# Patient Record
Sex: FEMALE | Race: WHITE | NOT HISPANIC OR LATINO | Employment: OTHER | URBAN - METROPOLITAN AREA
[De-identification: names, ages, dates, MRNs, and addresses within clinical notes are randomized per-mention and may not be internally consistent; named-entity substitution may affect disease eponyms.]

---

## 2017-02-20 ENCOUNTER — TRANSFERRED RECORDS (OUTPATIENT)
Dept: HEALTH INFORMATION MANAGEMENT | Facility: CLINIC | Age: 57
End: 2017-02-20

## 2017-03-27 ENCOUNTER — TRANSFERRED RECORDS (OUTPATIENT)
Dept: HEALTH INFORMATION MANAGEMENT | Facility: CLINIC | Age: 57
End: 2017-03-27

## 2017-03-31 ENCOUNTER — TRANSFERRED RECORDS (OUTPATIENT)
Dept: HEALTH INFORMATION MANAGEMENT | Facility: CLINIC | Age: 57
End: 2017-03-31

## 2017-04-03 ENCOUNTER — TRANSFERRED RECORDS (OUTPATIENT)
Dept: HEALTH INFORMATION MANAGEMENT | Facility: CLINIC | Age: 57
End: 2017-04-03

## 2017-04-07 ENCOUNTER — TRANSFERRED RECORDS (OUTPATIENT)
Dept: HEALTH INFORMATION MANAGEMENT | Facility: CLINIC | Age: 57
End: 2017-04-07

## 2017-04-17 ENCOUNTER — TRANSFERRED RECORDS (OUTPATIENT)
Dept: HEALTH INFORMATION MANAGEMENT | Facility: CLINIC | Age: 57
End: 2017-04-17

## 2017-05-26 PROCEDURE — 00000346 ZZHCL STATISTIC REVIEW OUTSIDE SLIDES TC 88321: Performed by: OBSTETRICS & GYNECOLOGY

## 2017-06-06 ASSESSMENT — ENCOUNTER SYMPTOMS
SNORES LOUDLY: 1
HEMOPTYSIS: 0
COUGH: 1
EXERCISE INTOLERANCE: 1
CLAUDICATION: 0
LIGHT-HEADEDNESS: 0
RESPIRATORY PAIN: 1
TACHYCARDIA: 1
LEG PAIN: 0
ORTHOPNEA: 1
SPUTUM PRODUCTION: 1
SHORTNESS OF BREATH: 1
COUGH DISTURBING SLEEP: 1
SYNCOPE: 0
POSTURAL DYSPNEA: 1
DYSPNEA ON EXERTION: 1
HYPOTENSION: 1
LEG SWELLING: 0
SLEEP DISTURBANCES DUE TO BREATHING: 0
WHEEZING: 1
HYPERTENSION: 0
PALPITATIONS: 0

## 2017-06-09 ENCOUNTER — ONCOLOGY VISIT (OUTPATIENT)
Dept: ONCOLOGY | Facility: CLINIC | Age: 57
End: 2017-06-09
Attending: OBSTETRICS & GYNECOLOGY
Payer: COMMERCIAL

## 2017-06-09 VITALS
TEMPERATURE: 97.9 F | BODY MASS INDEX: 22.07 KG/M2 | OXYGEN SATURATION: 89 % | HEIGHT: 63 IN | RESPIRATION RATE: 20 BRPM | SYSTOLIC BLOOD PRESSURE: 115 MMHG | DIASTOLIC BLOOD PRESSURE: 70 MMHG | WEIGHT: 124.56 LBS | HEART RATE: 98 BPM

## 2017-06-09 DIAGNOSIS — D06.9 SEVERE DYSPLASIA OF CERVIX (CIN III): Primary | ICD-10-CM

## 2017-06-09 LAB — COPATH REPORT: NORMAL

## 2017-06-09 PROCEDURE — 99213 OFFICE O/P EST LOW 20 MIN: CPT | Mod: ZF

## 2017-06-09 PROCEDURE — 99204 OFFICE O/P NEW MOD 45 MIN: CPT | Mod: ZP | Performed by: OBSTETRICS & GYNECOLOGY

## 2017-06-09 RX ORDER — ALBUTEROL SULFATE 90 UG/1
2 AEROSOL, METERED RESPIRATORY (INHALATION) EVERY 6 HOURS PRN
COMMUNITY
End: 2022-08-15

## 2017-06-09 RX ORDER — MINOCYCLINE HYDROCHLORIDE 100 MG/1
TABLET ORAL DAILY
COMMUNITY
End: 2021-05-25

## 2017-06-09 RX ORDER — LISINOPRIL 10 MG/1
5 TABLET ORAL DAILY
COMMUNITY
End: 2021-05-25

## 2017-06-09 RX ORDER — HYDROCHLOROTHIAZIDE 25 MG/1
12.5 TABLET ORAL DAILY
COMMUNITY
End: 2021-05-25

## 2017-06-09 RX ORDER — IPRATROPIUM BROMIDE 0.2 MG/ML
0.5 SOLUTION, NON-ORAL INHALATION DAILY
Status: ON HOLD | COMMUNITY
End: 2022-06-28

## 2017-06-09 ASSESSMENT — PAIN SCALES - GENERAL: PAINLEVEL: NO PAIN (0)

## 2017-06-09 NOTE — NURSING NOTE
"Oncology Rooming Note    June 9, 2017 11:58 AM   Sofie Rodriguez is a 57 year old female who presents for:    Chief Complaint   Patient presents with     Oncology Clinic Visit     New Referrel for Leep Procedure results , 2 nd opinion      Initial Vitals: /70 (BP Location: Right arm, Patient Position: Chair, Cuff Size: Adult Regular)  Pulse 98  Temp 97.9  F (36.6  C) (Oral)  Resp 20  Ht 1.6 m (5' 2.99\")  Wt 56.5 kg (124 lb 9 oz)  SpO2 (!) 89%  BMI 22.07 kg/m2 Estimated body mass index is 22.07 kg/(m^2) as calculated from the following:    Height as of this encounter: 1.6 m (5' 2.99\").    Weight as of this encounter: 56.5 kg (124 lb 9 oz). Body surface area is 1.58 meters squared.  No Pain (0) Comment: Data Unavailable   No LMP recorded.  Allergies reviewed: Yes  Medications reviewed: Yes    Medications: Medication refills not needed today.  Pharmacy name entered into EPIC: Data Unavailable    Clinical concerns: opinion  Nathaly  was notified.    8  minutes for nursing intake (face to face time)     Joelle Segura MA              "

## 2017-06-09 NOTE — PROGRESS NOTES
Gynecologic Oncology Clinic - Consult Note    Date: 2017     Dr. Amie Blanco MD  [facility]  [address] [address]       RE: Sofie Rodriguez  : 1960  RUTHANN: 2017    Dear Dr. Amie Blanco:    I had the pleasure of seeing your patient Sofie Rodriguez here at the Gynecologic Cancer Clinic at the [facility] on 2017.  As you know she is a very pleasant 57 year old woman with a recent diagnosis of  HECTOR III after LEEP.  Given these findings [facility].     She reports a remote history of abnormal pap smears prior to the year . She then had normal pap smears until , when she had a NILM, HR HPV positive pap smear. On 17 she had a pap smear with atypical endocervical cells, HR HPV positive. She underwent a colposcopy that was inadequate, with biopsies taken at 4:00 and 8:00. Pathology returned as HECTOR III, cannot rule out malignancy at 4:00; condyloma at 8:00. She subsequently underwent a LEEP on 17. Pathology returned as HECTOR I on ectocervical specimen, and HECTOR III with endocervical glandular involvement without evidence of invasive carcinoma on the endocervical specimen. Margins were not mentioned in the pathology report. Today she reports she is doing well.       Review of Systems:  Systemic  no weight changes; no fever; no chills; no night sweats; no appetite changes  Skin   no rashes, or lesions  Eye   no irritation; no changes in vision  HEENT +frequent ear infection, no dysphagia; no hoarseness   Pulmonary +chronic cough, occasional sputum; no shortness of breath  CV  no chest pain; no palpitations  GI  no diarrhea; no constipation; no abdominal pain; no changes in bowel  habits; no blood in stool    no urinary frequency; no urinary urgency; no dysuria; no pain; no abnormal vaginal discharge; no abnormal vaginal bleeding  Breast  no breast discharge; no breast changes; no  breast pain  MSK  no myalgias; no arthralgias; no back pain  Psychiatric  no depressed mood; no anxiety    Hematologic no tender lymph nodes; no noticeable swellings or lumps   Endocrine  no hot flashes; no heat/cold intolerance         Neurological no tremor; no numbness and tingling; no headaches; no difficulty  sleeping    Past Medical History:  Past Medical History:   Diagnosis Date     CHF (congestive heart failure) (H)      COPD (chronic obstructive pulmonary disease) (H)      HTN (hypertension)      Osteopenia      On 2-3L oxygen via nasal cannula.    OB history:   x3    Past Surgical History:  Past Surgical History:   Procedure Laterality Date     HAND SURGERY       LEEP TX, CERVICAL  2017    HECTOR III     LYMPH NODE BIOPSY Left     Left axilla, benign       Health Maintenance:  Health Maintenance Due   Topic Date Due     TETANUS IMMUNIZATION (SYSTEM ASSIGNED)  1980     ADVANCE DIRECTIVE PLANNING Q5 YRS  1980     HEPATITIS C SCREENING  1980     PAP SCREENING Q3 YR (SYSTEM ASSIGNED)  1983     MAMMO SCREEN Q2 YR (SYSTEM ASSIGNED)  2002     LIPID SCREEN Q5 YR FEMALE (SYSTEM ASSIGNED)  2007     COLON CANCER SCREEN (SYSTEM ASSIGNED)  2012       Last Pap Smear: 17              Result: abnormal: atypical endocervical cells; She has had a history of abnormal Pap smears. Prior pap smear in  was NILM, HR HPV positive.  Last Mammogram:               Result: normal; She has not had a history of abnormal mammograms.  Last Colonoscopy:               Result: normal; next due in     Last DEXA Scan: 2016            L spine -0.9, L hip -1.1, R hip 1  Last Diabetes Screening: unsure  Last Thyroid Screening:      unsure  Last Cholest Screening:      unsure    Current Medications:   Current Outpatient Prescriptions   Medication Sig Dispense Refill     hydrochlorothiazide (HYDRODIURIL) 25 MG tablet Take 12.5 mg by mouth daily        lisinopril  "(PRINIVIL/ZESTRIL) 10 MG tablet Take 5 mg by mouth daily          Allergies:   No Known Allergies     Social History:     Social History   Substance Use Topics     Smoking status: Current Every Day Smoker     Smokeless tobacco: Not on file     Alcohol use Not on file       History   Drug Use Not on file       Family History: Notable for father with prostate cancer at age 62,  at age 82 from unrelated cause (patient unsure); mother with history of cervical cancer at age 55, skin cancer other than melanoma, still living and doing well.    Physical Exam:   /70 (BP Location: Right arm, Patient Position: Chair, Cuff Size: Adult Regular)  Pulse 98  Temp 97.9  F (36.6  C) (Oral)  Resp 20  Ht 1.6 m (5' 2.99\")  Wt 56.5 kg (124 lb 9 oz)  SpO2 (!) 89%  BMI 22.07 kg/m2  Body mass index is 22.07 kg/(m^2).  General :  healthy and alert, no distress  HEENT:  O2 via nasal cannula in place  Cardiovascular: regular rate and rhythm, no gallops, rubs or murmurs   Respiratory:  lungs clear other than occasional wheeze, no rales, rhonchi     Copath Report 2017 12:00 AM 88   Patient Name: ALMAS CASIANO   MR#: [id number]   Specimen #: [id number]   Collected: 2017   Received: 2017   Reported: 2017 12:38   Ordering Phy(s): ELICIA MEJIA   Additional Phy(s): [facility], Department of   Pathology     For improved result formatting, select 'View Enhanced Report Format'   under Linked Documents section.     TEST(S):   2 slides, case #[id number]     FINAL DIAGNOSIS:   CASE FROM [place], [place] ([id number], OBTAINED 17):   A. Endocervix, LEEP:   - High grade squamous intraepithelial lesion (cervical intraepithelial   neoplasia 3, HECTOR 3)   - HECTOR 3 involves endocervical glands   - Margins are uninvolved by HECTOR 3   - Transformation zone present     B. Ectocervix, LEEP:   - Low grade squamous intraepithelial lesion (cervical intraepithelial   neoplasia 1)   - HECTOR 1 is " "present at one cauterized margin (unoriented specimen)   - Transformation zone absent     COMMENT:   Specimen B (\"ectocervix\") was received as two unoriented pieces of   tissue, according to the outside pathology report.  HECTOR 1 is present at   the cauterized edge of one of the pieces of tissue.  We cannot determine   whether or not that is a true margin.     We agree with the findings of the original interpreting pathologist.     I have personally reviewed all specimens and or slides, including the   listed special stains, and used them with my medical judgement to   determine the final diagnosis.     Electronically signed out by:     Jannet Slaughter M.D., [facility]          Assessment: Sofie Rodriguez is a 57 year old woman with a new diagnosis of HECTOR III, s/p LEEP. Referred for management/surveillance recommendations.   1. Disease - HECTOR III, s/p LEEP    Plan:   1.)   Reviewed that patient has had appropriate treatment with LEEP, and no additional procedures are required at this time.  2.) Recommend pap smear + co-testing in 12 months with Dr. Blanco. If normal, repeat pap smear + co-testing at 24 months. If normal, may resume routine screening (q3 year pap or q5 year co-testing).  3.) Questions answered. No additional testing or procedures at this time.     Rika Olson MD as scribe for Dr. Andersen    A total of 40 minutes was spent with the patient, 20 minutes of which were spent in counseling the patient and/or treatment planning.    Thank you for allowing us to participate in the care of your patient.         Sincerely,  Renzo Andersen Jr., M.D., M.S.  Professor, Gynecologic Oncology  Obstetrics, Gynecology and Women's Health   [facility] [employer]  Chief Medical Officer  [facility] and [facility] evaluated and examined this patient and directed all aspects of her care as outlined in mine and the resident's note above        FRANCHESCA RECIO     "

## 2017-06-09 NOTE — MR AVS SNAPSHOT
"              After Visit Summary   6/9/2017    Sofie Rodriguez    MRN: [id number]           Patient Information     Date Of Birth          1960        Visit Information        Provider Department      6/9/2017 12:00 PM Renzo Andersen MD [facility]        Today's Diagnoses     Severe dysplasia of cervix (HECTOR III)    -  1       Follow-ups after your visit        Who to contact     If you have questions or need follow up information about today's clinic visit or your schedule please contact [facility] directly at [phone].  Normal or non-critical lab and imaging results will be communicated to you by [employer], letter or phone within 4 business days after the clinic has received the results. If you do not hear from us within 7 days, please contact the clinic through [employer] or phone. If you have a critical or abnormal lab result, we will notify you by phone as soon as possible.  Submit refill requests through [employer] or call your pharmacy and they will forward the refill request to us. Please allow 3 business days for your refill to be completed.          Additional Information About Your Visit        MyChart Information     [employer] gives you secure access to your electronic health record. If you see a primary care provider, you can also send messages to your care team and make appointments. If you have questions, please call your primary care clinic.  If you do not have a primary care provider, please call [phone] and they will assist you.        Care EveryWhere ID     This is your Care EveryWhere ID. This could be used by other organizations to access your [facility] medical records  [id number]        Your Vitals Were     Pulse Temperature Respirations Height Pulse Oximetry BMI (Body Mass Index)    98 97.9  F (36.6  C) (Oral) 20 1.6 m (5' 2.99\") 89% 22.07 kg/m2       Blood Pressure from Last 3 Encounters:   06/09/17 115/70    Weight from Last 3 Encounters:   06/09/17 " 56.5 kg (124 lb 9 oz)              Today, you had the following     No orders found for display       Primary Care Provider    None Specified       No primary provider on file.        Equal Access to Services     HUNG PAYAN : Hadii aad ku hadgueroradha Hicks, jhonathan deepaamritha, nathaniel paigebetzaida sanders, eduarda rmelba ed. So [facility] [phone].    ATENCIÓN: Si habla español, tiene a guadalupe disposición servicios gratuitos de asistencia lingüística. Llame al [phone].    We comply with applicable federal civil rights laws and [place] laws. We do not discriminate on the basis of race, color, national origin, age, disability sex, sexual orientation or gender identity.            Thank you!     Thank you for choosing [facility]  for your care. Our goal is always to provide you with excellent care. Hearing back from our patients is one way we can continue to improve our services. Please take a few minutes to complete the written survey that you may receive in the mail after your visit with us. Thank you!             Your Updated Medication List - Protect others around you: Learn how to safely use, store and throw away your medicines at www.disposemymeds.org.          This list is accurate as of: 6/9/17 11:59 PM.  Always use your most recent med list.                   Brand Name Dispense Instructions for use Diagnosis    albuterol 108 (90 BASE) MCG/ACT Inhaler    PROAIR HFA/PROVENTIL HFA/VENTOLIN HFA     Inhale 2 puffs into the lungs every 6 hours as needed for shortness of breath / dyspnea or wheezing        BREO ELLIPTA 100-25 MCG/INH oral inhaler   Generic drug:  fluticasone-vilanterol      Inhale 1 puff into the lungs daily 25 mcg        hydrochlorothiazide 25 MG tablet    HYDRODIURIL     Take 12.5 mg by mouth daily        INCRUSE ELLIPTA 62.5 MCG/INH oral inhaler   Generic drug:  umeclidinium      Inhale 1 puff into the lungs daily 62.5mcg        ipratropium 0.02 % nebulizer  solution    ATROVENT     Take 0.5 mg by nebulization daily 0.5md daily        lisinopril 10 MG tablet    PRINIVIL/ZESTRIL     Take 5 mg by mouth daily        minocycline 100 MG tablet    DYNACIN     Take by mouth daily

## 2017-06-09 NOTE — LETTER
2017       RE: Sofie Rodriguez  [address] [place] [address]     Dear Colleague,    Thank you for referring your patient, Sofie Rodriguez, to the [facility]. Please see a copy of my visit note below.    Gynecologic Oncology Clinic - Consult Note    Date: 2017     Dr. Amie Blanco MD  [facility]  [address] [place] [address]       RE: Sofie Rodriguez  : 1960  RUTHANN: 2017    Dear Dr. Amie Blanco:    I had the pleasure of seeing your patient Sofie Rodriguez here at the Gynecologic Cancer Clinic at the [facility] on 2017.  As you know she is a very pleasant 57 year old woman with a recent diagnosis of  HECTOR III after LEEP.  Given these findings she was subsequently sent to the [facility] for new patient consultation.     She reports a remote history of abnormal pap smears prior to the year . She then had normal pap smears until , when she had a NILM, HR HPV positive pap smear. On 17 she had a pap smear with atypical endocervical cells, HR HPV positive. She underwent a colposcopy that was inadequate, with biopsies taken at 4:00 and 8:00. Pathology returned as HECTOR III, cannot rule out malignancy at 4:00; condyloma at 8:00. She subsequently underwent a LEEP on 17. Pathology returned as HECTOR I on ectocervical specimen, and HECTOR III with endocervical glandular involvement without evidence of invasive carcinoma on the endocervical specimen. Margins were not mentioned in the pathology report. Today she reports she is doing well.       Review of Systems:  Systemic  no weight changes; no fever; no chills; no night sweats; no appetite changes  Skin   no rashes, or lesions  Eye   no irritation; no changes in vision  HEENT +frequent ear infection, no dysphagia; no hoarseness   Pulmonary +chronic cough, occasional sputum; no shortness of breath  CV  no chest pain; no palpitations  GI  no diarrhea; no constipation; no abdominal  pain; no changes in bowel  habits; no blood in stool    no urinary frequency; no urinary urgency; no dysuria; no pain; no abnormal vaginal discharge; no abnormal vaginal bleeding  Breast  no breast discharge; no breast changes; no breast pain  MSK  no myalgias; no arthralgias; no back pain  Psychiatric  no depressed mood; no anxiety    Hematologic no tender lymph nodes; no noticeable swellings or lumps   Endocrine  no hot flashes; no heat/cold intolerance         Neurological no tremor; no numbness and tingling; no headaches; no difficulty  sleeping    Past Medical History:  Past Medical History:   Diagnosis Date     CHF (congestive heart failure) (H)      COPD (chronic obstructive pulmonary disease) (H)      HTN (hypertension)      Osteopenia      On 2-3L oxygen via nasal cannula.    OB history:   x3    Past Surgical History:  Past Surgical History:   Procedure Laterality Date     HAND SURGERY       LEEP TX, CERVICAL  2017    HECTOR III     LYMPH NODE BIOPSY Left     Left axilla, benign       Health Maintenance:  Health Maintenance Due   Topic Date Due     TETANUS IMMUNIZATION (SYSTEM ASSIGNED)  1980     ADVANCE DIRECTIVE PLANNING Q5 YRS  1980     HEPATITIS C SCREENING  1980     PAP SCREENING Q3 YR (SYSTEM ASSIGNED)  1983     MAMMO SCREEN Q2 YR (SYSTEM ASSIGNED)  2002     LIPID SCREEN Q5 YR FEMALE (SYSTEM ASSIGNED)  2007     COLON CANCER SCREEN (SYSTEM ASSIGNED)  2012       Last Pap Smear: 17              Result: abnormal: atypical endocervical cells; She has had a history of abnormal Pap smears. Prior pap smear in  was NILM, HR HPV positive.  Last Mammogram:               Result: normal; She has not had a history of abnormal mammograms.  Last Colonoscopy:               Result: normal; next due in     Last DEXA Scan: 2016            L spine -0.9, L hip -1.1, R hip 1  Last Diabetes Screening: unsure  Last Thyroid Screening:      unsure  Last  "Cholest Screening:      unsure    Current Medications:   Current Outpatient Prescriptions   Medication Sig Dispense Refill     hydrochlorothiazide (HYDRODIURIL) 25 MG tablet Take 12.5 mg by mouth daily        lisinopril (PRINIVIL/ZESTRIL) 10 MG tablet Take 5 mg by mouth daily          Allergies:   No Known Allergies     Social History:     Social History   Substance Use Topics     Smoking status: Current Every Day Smoker     Smokeless tobacco: Not on file     Alcohol use Not on file       History   Drug Use Not on file       Family History: Notable for father with prostate cancer at age 62,  at age 82 from unrelated cause (patient unsure); mother with history of cervical cancer at age 55, skin cancer other than melanoma, still living and doing well.    Physical Exam:   /70 (BP Location: Right arm, Patient Position: Chair, Cuff Size: Adult Regular)  Pulse 98  Temp 97.9  F (36.6  C) (Oral)  Resp 20  Ht 1.6 m (5' 2.99\")  Wt 56.5 kg (124 lb 9 oz)  SpO2 (!) 89%  BMI 22.07 kg/m2  Body mass index is 22.07 kg/(m^2).  General :  healthy and alert, no distress  HEENT:  O2 via nasal cannula in place  Cardiovascular: regular rate and rhythm, no gallops, rubs or murmurs   Respiratory:  lungs clear other than occasional wheeze, no rales, rhonchi     Copath Report 2017 12:00 AM 88   Patient Name: ALMAS CASIANO   MR#: [id number]   Specimen #: [id number]   Collected: 2017   Received: 2017   Reported: 2017 12:38   Ordering Phy(s): ELICIA MEJIA   Additional Phy(s): [facility], Department of   Pathology     For improved result formatting, select 'View Enhanced Report Format'   under Linked Documents section.     TEST(S):   2 slides, case #[id number]     FINAL DIAGNOSIS:   CASE FROM [place], [place] ([id number], OBTAINED 17):   A. Endocervix, LEEP:   - High grade squamous intraepithelial lesion (cervical intraepithelial   neoplasia 3, HECTOR 3)   - HECTOR 3 " "involves endocervical glands   - Margins are uninvolved by HECTOR 3   - Transformation zone present     B. Ectocervix, LEEP:   - Low grade squamous intraepithelial lesion (cervical intraepithelial   neoplasia 1)   - HECTOR 1 is present at one cauterized margin (unoriented specimen)   - Transformation zone absent     COMMENT:   Specimen B (\"ectocervix\") was received as two unoriented pieces of   tissue, according to the outside pathology report.  HECTOR 1 is present at   the cauterized edge of one of the pieces of tissue.  We cannot determine   whether or not that is a true margin.     We agree with the findings of the original interpreting pathologist.     I have personally reviewed all specimens and or slides, including the   listed special stains, and used them with my medical judgement to   determine the final diagnosis.     Electronically signed out by:     Jannet Slaughter M.D., [facility]          Assessment: Sofie Rodriguez is a 57 year old woman with a new diagnosis of HECTOR III, s/p LEEP. Referred for management/surveillance recommendations.   1. Disease - HECTOR III, s/p LEEP    Plan:   1.)   Reviewed that patient has had appropriate treatment with LEEP, and no additional procedures are required at this time.  2.) Recommend pap smear + co-testing in 12 months with Dr. Blanco. If normal, repeat pap smear + co-testing at 24 months. If normal, may resume routine screening (q3 year pap or q5 year co-testing).  3.) Questions answered. No additional testing or procedures at this time.     Rika Olson MD as scribe for Dr. Andersen    A total of 40 minutes was spent with the patient, 20 minutes of which were spent in counseling the patient and/or treatment planning.    Thank you for allowing us to participate in the care of your patient.         Sincerely,  Renzo Andersen Jr., M.D., M.S.  Professor, Gynecologic Oncology  Obstetrics, Gynecology and Women's Health   [employer] [place] [employer]  Chief Medical " Officer  [facility] evaluated and examined this patient and directed all aspects of her care as outlined in mine and the resident's note above        CC  FRANCHESCA BRADFORD

## 2018-01-21 ENCOUNTER — HEALTH MAINTENANCE LETTER (OUTPATIENT)
Age: 58
End: 2018-01-21

## 2018-07-03 LAB — HEP C HIM: NORMAL

## 2018-09-15 LAB — TSH SERPL-ACNC: 2.19 UIU/ML (ref 0.47–5)

## 2019-04-29 ENCOUNTER — TRANSFERRED RECORDS (OUTPATIENT)
Dept: HEALTH INFORMATION MANAGEMENT | Facility: CLINIC | Age: 59
End: 2019-04-29

## 2019-07-17 LAB
HPV ABSTRACT: NORMAL
PAP-ABSTRACT: NORMAL

## 2020-01-11 ENCOUNTER — TRANSFERRED RECORDS (OUTPATIENT)
Dept: HEALTH INFORMATION MANAGEMENT | Facility: CLINIC | Age: 60
End: 2020-01-11

## 2020-01-11 LAB — INR PPP: 1 (ref 0.9–1.1)

## 2020-01-12 LAB — EJECTION FRACTION: NORMAL %

## 2020-01-15 LAB
CREAT SERPL-MCNC: 0.67 MG/DL (ref 0.57–1.11)
GFR SERPL CREATININE-BSD FRML MDRD: >60 ML/MIN/1.73M2

## 2020-03-10 ENCOUNTER — HEALTH MAINTENANCE LETTER (OUTPATIENT)
Age: 60
End: 2020-03-10

## 2020-03-11 ENCOUNTER — TRANSFERRED RECORDS (OUTPATIENT)
Dept: HEALTH INFORMATION MANAGEMENT | Facility: CLINIC | Age: 60
End: 2020-03-11

## 2020-11-13 LAB
ALT SERPL-CCNC: 15 U/L (ref 8–45)
AST SERPL-CCNC: 32 U/L (ref 5–41)
CHOLEST SERPL-MCNC: 234 MG/DL
HBA1C MFR BLD: 5.1 % (ref 0–5.7)
HDLC SERPL-MCNC: 75 MG/DL
LDLC SERPL CALC-MCNC: 142 MG/DL (ref 0–159)
TRIGL SERPL-MCNC: 86 MG/DL (ref 30–150)

## 2020-11-17 ENCOUNTER — TRANSFERRED RECORDS (OUTPATIENT)
Dept: HEALTH INFORMATION MANAGEMENT | Facility: CLINIC | Age: 60
End: 2020-11-17

## 2020-11-17 LAB
GLUCOSE SERPL-MCNC: 89 MG/DL (ref 70–100)
POTASSIUM SERPL-SCNC: 4.9 MMOL/L (ref 3.5–5.1)

## 2020-12-27 ENCOUNTER — HEALTH MAINTENANCE LETTER (OUTPATIENT)
Age: 60
End: 2020-12-27

## 2021-01-27 ENCOUNTER — TRANSFERRED RECORDS (OUTPATIENT)
Dept: HEALTH INFORMATION MANAGEMENT | Facility: CLINIC | Age: 61
End: 2021-01-27

## 2021-02-03 ENCOUNTER — REFERRAL (OUTPATIENT)
Dept: TRANSPLANT | Facility: CLINIC | Age: 61
End: 2021-02-03

## 2021-02-03 DIAGNOSIS — J44.9 COPD (CHRONIC OBSTRUCTIVE PULMONARY DISEASE) (H): Primary | ICD-10-CM

## 2021-02-03 ASSESSMENT — MIFFLIN-ST. JEOR: SCORE: 1109.32

## 2021-02-03 NOTE — LETTER
2021       To: [facility]- Image Department     Fax: [phone]    Re: Sofie Rodriguez    1960       Your patient, was referred and is being evaluated as a possible solid organ transplant candidate at [facility]. In order to make the best possible recommendations for this patient, we require the following IMAGES:      Chest CT (all within the last 24 months)    Chest XR (most recent)    Echocardiogram (most recent)    All requested information needs to arrive at our facility by March 3, 2021      Please fax all reports and paper records to [phone] .    Requested imaging may be electronically sent to the [facility] imaging system via [id number] DICOM connection. When unable to send imaging electronically, an exported DICOM CD may be sent. Please indicate when imaging has been sent electronically on your return cover sheet.    Requested pathology slides should be accompanied by the appropriate report from your institution.    If the patient is hand carrying requested records or the requested records are not at your facility, please indicate this information on a return cover sheet.    If you have any questions or problems, call our office at [phone].      [facility]  Solid Organ Transplant Office  Attn:    [address] [facility]  [address], [facility] [address]  [place] [address]    Sincerely,      Solid Organ Transplant Admin

## 2021-02-03 NOTE — LETTER
February 10, 2021        Sofie Rodriguez  [address]  [place] [address]      Dear Sofie,       You have recently expressed interest in our Solid Organ Transplant Program and have requested to begin the evaluation process.  We have made several attempts to get in touch with you but have been unable to reach you.    If you are still interested and/or have any questions, please contact us at  [phone] or [phone]   Monday - Friday, between the hours of 8:30am and 5:00pm central time.    We look forward to hearing from you.      Regards,     Solid Organ Transplant Intake   [facility]  [address] [place] [address]  [address], [facility] [address]  [place] [address]

## 2021-02-03 NOTE — LETTER
February 12, 2021    Sofie Rodriguez  [address] [place] [address]          Dear Sofie,    Thank you for your interest in the Transplant Center at [facility]. We look forward to being a part of your care team and assisting you through the transplant process.    As we discussed, your transplant coordinator is Katlin Marquez (Lung).  You may call your coordinator at any time with questions or concerns.  Your first scheduled call will be on February 24, 2021 between 10:00 am and 12:00 pm.  If this needs to change, call [phone].    Please complete the following.    1. Fill out and return the enclosed forms    Authorization for Electronic Communication    Authorization to Discuss Protected Health Information    Authorization for Release of Protected Health Information    Authorization for Care Everywhere Release of Information    2. Sign up for:    [facility] ([facility]), access to your electronic medical record (see enclosed pamphlet)    [email or web address], a transplant education website    You can use these tools to learn more about your transplant, communicate with your care team, and track your medical details    Sincerely,  Solid Organ Transplant  [facility]    Cc: Josh Patel MD (Referring) Vinny Berrios MD (PCP)

## 2021-02-12 VITALS — HEIGHT: 64 IN | WEIGHT: 120 LBS | BODY MASS INDEX: 20.49 KG/M2

## 2021-02-12 SDOH — HEALTH STABILITY: MENTAL HEALTH: HOW OFTEN DO YOU HAVE 6 OR MORE DRINKS ON ONE OCCASION?: NOT ASKED

## 2021-02-12 SDOH — HEALTH STABILITY: MENTAL HEALTH: HOW MANY STANDARD DRINKS CONTAINING ALCOHOL DO YOU HAVE ON A TYPICAL DAY?: NOT ASKED

## 2021-02-12 SDOH — HEALTH STABILITY: MENTAL HEALTH: HOW OFTEN DO YOU HAVE A DRINK CONTAINING ALCOHOL?: 2-4 TIMES A MONTH
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
## 2021-03-06 ENCOUNTER — HEALTH MAINTENANCE LETTER (OUTPATIENT)
Age: 61
End: 2021-03-06

## 2021-03-26 ENCOUNTER — VIRTUAL VISIT (OUTPATIENT)
Dept: TRANSPLANT | Facility: CLINIC | Age: 61
End: 2021-03-26
Attending: INTERNAL MEDICINE
Payer: MEDICARE

## 2021-03-26 ENCOUNTER — TELEPHONE (OUTPATIENT)
Dept: TRANSPLANT | Facility: CLINIC | Age: 61
End: 2021-03-26

## 2021-03-26 VITALS — WEIGHT: 127.4 LBS | BODY MASS INDEX: 21.87 KG/M2

## 2021-03-26 DIAGNOSIS — B18.2 CHRONIC HEPATITIS C WITHOUT HEPATIC COMA (H): Primary | ICD-10-CM

## 2021-03-26 DIAGNOSIS — F10.11 HISTORY OF ETOH ABUSE: ICD-10-CM

## 2021-03-26 DIAGNOSIS — Z01.818 ENCOUNTER FOR PRE-TRANSPLANT EVALUATION FOR LUNG TRANSPLANT: ICD-10-CM

## 2021-03-26 PROCEDURE — 99205 OFFICE O/P NEW HI 60 MIN: CPT | Mod: 95 | Performed by: INTERNAL MEDICINE

## 2021-03-26 PROCEDURE — 99417 PROLNG OP E/M EACH 15 MIN: CPT | Performed by: INTERNAL MEDICINE

## 2021-03-26 RX ORDER — ASCORBIC ACID 100 MG
1 TABLET,CHEWABLE ORAL DAILY
Status: ON HOLD | COMMUNITY
End: 2022-08-15

## 2021-03-26 ASSESSMENT — PAIN SCALES - GENERAL: PAINLEVEL: NO PAIN (0)

## 2021-03-26 NOTE — PROGRESS NOTES
Sofie is a 60 year old who is being evaluated via a billable video visit.      How would you like to obtain your AVS? MyChart  If the video visit is dropped, the invitation should be resent by: Text to cell phone: [phone]  Will anyone else be joining your video visit? Yes: Daughter. How would they like to receive their invitation? Text to cell phone: [phone](Perry Dumont [phone]      Video Start Time: 1:45  Video-Visit Details    Type of service:  Video Visit    Video End Time:2:57 PM    Originating Location (pt. Location): Home    Distant Location (provider location):  [facility]     Platform used for Video Visit: Ziebel

## 2021-03-26 NOTE — LETTER
3/26/2021         RE: Sofie Rodriguez  [address] [place] [address]        Dear Colleague,    Thank you for referring your patient, Sofie Rodriguez, to the [facility]ANSPLANT [facility]. Please see a copy of my visit note below.      [facility]  Transplant Clinic - Initial Visit -  March 26, 2021         Assessment and Plan:   Sofie Rodriguez is a 60 year old female with history of COPD and bronchiectasis  who is seen today for evaluation for lung transplantation.    1) Evaluation for lung transplantation:  Ms. Rodriguez does have significantly severe obstructive disease. 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 She has a very strong support system with her two daughters making it on the phone visit today. They all asked very insightful and thoughtful questions.   From her report it does sound as if she's had a good year without any exacerbations, but she has required 3 intubations in the past. She has not declined any further this year and it seems like her symptoms may have stabilized after moving out of her old home full of mold. She reports also that her primary pulmonologist also feels she would be a good candidate for transplant.   I do have a few concerns regarding her previous drug use but it certainly sounds like she has no cravings or relapses.  She also has a history of cervical intraepithelial neoplasia for which she underwent a LEEP procedure, she reports she would not need another pap smear for 3-5 years but will follow up.   I would like her to see hepatology for her Hep C follow up and make sure she doesn't have any evidence of fibrosis/cirrhosis and also have a mental health screen to determine need for chem dep vs.  neuropsych almas although she may not need either of these.     In addition to a complete history and physical, I discussed transplant at length with the patient.  We reviewed the risks and benefits of transplantation.  Our discussion included the recent survival statistics.  I discussed rejection, including hyperacute, acute, chronic and antibody mediated.  I discussed the need for surveillance biopsies.  I reviewed the standard immunosuppression and typical side effects, including renal failure.  I discussed the increased risk of infection and the use of prophylactic antibiotics. I reviewed the increased risk of malignancy. I discussed the listing system, including the Lung Allocation Score.  I discussed the typical operative and postoperative course.  I reviewed the usual clinic followup.  I explained to the patient that she is required to stay in the [facility] for three months following transplantation and that she  will need to have someone accompanying her during that time.  I discussed the importance of strict medication and clinic adherence after transplantation. The patient had a number of questions which were answered to her apparent satisfaction.      - Refer to hepatology  - Refer to SW to do a screen regarding her previous use of drugs and mental health anxiety  - Depending on the above we will bring her to transplant committee to discuss pursuing a full eval      Coordinator/MD: Katlin Pizarro/Claribel Franks    RTC:   Influenza and other vaccinations:   Annual dermatology visit:    Claribel Franks MD  [facility]   Pulmonary Transplant   PreTransplant coordinator: Katlin Marquez  Fax: [phone]  Ph: [phone]          Problem List:     1. Evaluation for lung transplantation  2. CAD  a. Chest CT notes multivessel calcifications  3. Hepatitis C: Diagnosed in 1980s, 2 mos of treatment, quant negative since 10/2017, last positive 2/20/17  (952,916)  4. Mental Health  a. Episodes of depression when younger  5. Steroid Induced Hyperglycemia  6. ?Diastolic Dysfunction  7. Hx of colonization with Mycobacterium peregrinum         History of Present Illness:   Sofie Rodriguez is a 60 year old female with history of COPD/Bronchiectasis who is seen today for evaluation for lung transplantation. She is referred by Dr. Josh Patel, and Pauline Madrigal NP from [facility] with a history of COPD/Bronchiectasis.     She reportedly smoked from a young age and has experienced dyspnea intermittently since 10/2013. She went to a doctor and was found to be 82% on RA and was sent to the hospital where she was diagnosed with COPD and started on continuous oxygen. She was also diagnosed with CHF at the time and was diuresed aggressively. She is usually hospitalized about 1-2 times a year, but her last major hospitalization was in 1/2020, she required intubation during her hospitalization, she states she has been intubated 3 times in the past. She has not required steroids in the last year.   She is attending pulmonary rehab currently, twice weekly, and just started on March 8, 2021 this is her third or fourth time doing it. Since she started her pulm rehab she's realized she can maybe only walk a block before she gets short of breath. She can walk a flight of stairs without stopping but has to stop and take a break at the top.   She is doing strengthening exercises with resistance bands. She can do her ADLs and IADLs without stopping but does get short of breath. She is able to leave the house, but her son drives her.   This last year she's had no hospitalizations and no ER visits. She lives with her son and feels better at home.   She hasn't lost any significant weight, she's actually gained about 10 lbs in the last year.   She has no history of heartburn or acid reflux, denies waterbrash. No reported aspiration symptoms.   During exacerbations she'll feel more tired,  and has a more productive cough green in nature, wheezes when sick. She does bring up sputum every day even when sick, although the volume is usually fairly low.     Regarding her drug use history, she self quit meth and marijuana about 2 years ago but does not attend any meetings or rehab.       Current Meds:  Duonebs couple times a week  Albuterol inhaler uses during pulm rehab  Breo incruse daily  Ellipta incruse daily  [id number]  MV     Oxygen Use:   At rest 3L  Sleep: 3L bleed in with Trilogy, using 3-4 nights/week, will use trilogy during the day few times a week  With activity: 4-5L       Review of Systems:   Please see HPI, otherwise the complete 10 point ROS is negative.           Past Medical and Surgical History:     Past Medical History:   Diagnosis Date     CHF (congestive heart failure) (H)      COPD (chronic obstructive pulmonary disease) (H)      Hepatitis 2017    Hep C, [facility]     HTN (hypertension)      Osteopenia      Past Surgical History:   Procedure Laterality Date     COLONOSCOPY  2015     ENT SURGERY  1974    tonsillectomy     HAND SURGERY       LEEP TX, CERVICAL  04/07/2017    HECTOR III     LYMPH NODE BIOPSY Left 2005    Left axilla, benign- [place]     Hepatitis C, diagnosed in the 1982. Her viral loads were back up in 2017 and then she was treated for it for 2 months.: Hep C quant negative since 10/2017, last positive 2/20/17 ([phone])        Family History:     Grandpa: MI  Mom: Skin ca, cervical ca  Dad: Prostate ca, CAD, d. At age 82  Brother: COPD (Smoker)  Brother: No medical care  Sister: healthy  Sister: Healthy         Social History:     Tobacco: Cigarettes smoked for 30-35 years 1-1.5ppd, quit in 11/11/20  ETOH: Very seldom, maybe 1-2 times/month bacardi limon or 2 glasses of wine. Reports heavy use as a teenager/young adult. Went through chem dep for alcohol use once in adolescence, and then around ages [age]. 2 DUIs, one in 1998 and then in 2008.   Illicits: Hx of Meth few  "times per week for about 15-20 years, last used 3/2019ish and marijuana frequently at times daily since age 17, quit around 3/2019ish. Stopped smoking marijuana because she couldn't breathe, the meth she quit b/c she just didn't feel healthy.  Exposures: Her old house probably had mold in it which may account for frequency of exacerbations.   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   Pets: None    Social: Single, 3 children, currently living with son, 2 daughters.          Medications:     Current Outpatient Medications   Medication     albuterol (PROAIR HFA/PROVENTIL HFA/VENTOLIN HFA) 108 (90 BASE) MCG/ACT Inhaler     fluticasone-vilanterol (BREO ELLIPTA) 100-25 MCG/INH oral inhaler     hydrochlorothiazide (HYDRODIURIL) 25 MG tablet     ipratropium (ATROVENT) 0.02 % nebulizer solution     lisinopril (PRINIVIL/ZESTRIL) 10 MG tablet     minocycline (DYNACIN) 100 MG tablet     umeclidinium (INCRUSE ELLIPTA) 62.5 MCG/INH oral inhaler     No current facility-administered medications for this visit.             Physical Exam:   Wt 57.8 kg (127 lb 6.4 oz)   Breastfeeding No   BMI 21.87 kg/m       Height 5'3\"  Weight 128 lbs  BMI 22.6    Exam limited by virtual nature of visit    Constitutional - looks well, in no apparent distress  Eyes - no redness or discharge  Respiratory -breathing appears comfortable.  No cough. Speaking in full sentences, not conversationally dyspneic. No accessory muscle use.Wearing oxygen  Skin - No appreciable discoloration or lesions (very limited exam)  Neurological - No apparent tremors. Speech fluent and articlate  Psychiatric - no signs of delirium or anxiety  .         Data:   All laboratory and imaging data reviewed.      No results found for this or any previous visit (from the past 168 hour(s)).      PFT interpretation: March 25, " 2021    Date Place TLC (%) FVC (%) FEV1 (%) FEV1/FVC DLCO (%) Note   3/11/20 [facility]   1.66 50 0.45 17 27% 2.78 11 No significant bronchodilatory response   9/24/15 [facility]     0.89 33 17%   No bronchodilatory response                                 6MWT Distance:       Serology:  CMV:  EBV:  HSV:     Micro:  4/30/2019 MYCOBACTERIUM PEREGRINUM      Labs:   Last A1c 5.1 11/13/2020  Alpha 1 AT: 1/27/21: Level 150           Imaging:       Chest CT w contrast: 1/12/20  Moderate cardiomegaly with left ventricular hypertrophy.  Moderate pericardial  effusion without evidence of compression    IMPRESSION:  Advanced emphysematous changes with likely superimposed interstitial edema.  Small to moderate right and small left pleural effusions with accompanying lower lobe atelectasis.  Moderate pericardial effusion.  Negative for pulmonary embolus.         Cardiac:     Most Recent TTE:  ECHO: 1/12/20  Summary:    * Left ventricular segmental wall motion is normal.    * The estimated ejection fraction is 55-60%.    * The right ventricle is enlarged.    * Reduced right ventricular systolic function.    * There is aortic valve sclerosis.    * There is trace mitral regurgitation.    * There is trace tricuspid regurgitation.    * Borderline pulmonary hypertension, estimated pulmonary arterial Systolic pressure is  39 mmHg.    * The right atrium is mildly enlarged.    * There is small to moderate cirumferential pericardial effusion visualized.    * No hemodynamic findings consistent with tamponade physiology.    * Prior study from 03/01/2019.    * Pericardial effusion is new.    Stress Test/Angiogram:    RHC:          GI:   EGD  [place]         Endo   DEXA:         Health Maintenance     Mammogram: 10/2020    ASSESSMENT:   1.  ACR BI-RADS Category 2: Benign  2.  Dense Breast Tissue  3.  TLR = 8.2%, Low Risk Category     PAP: 07/2019 Negative, HPV negative  Abnormal Pap smear of cervix 02/20/2017   with High Risk HPV. Atypical  Endocervical Cells, Positive HPV      Colonoscopy: no polyps, 2015 [facility] 10 year follow up     Dental: Two years - Dentures. Full dentures upper and lower. Still with native teeth.             I spent 100 minutes between our virtual visit, documentation, and chart review on 3/26/21.             Sofie is a 60 year old who is being evaluated via a billable video visit.      How would you like to obtain your AVS? MyChart  If the video visit is dropped, the invitation should be resent by: Text to cell phone: [phone]  Will anyone else be joining your video visit? Yes: Daughter. How would they like to receive their invitation? Text to cell phone: [phone] (Karen) [phone]      Video Start Time: 1:45  Video-Visit Details    Type of service:  Video Visit    Video End Time:2:57 PM    Originating Location (pt. Location): Home    Distant Location (provider location):  [facility] TR[facility]PL[facility] CLIN[facility]     Platform used for Video Visit: Well        Again, thank you for allowing me to participate in the care of your patient.        Sincerely,        Claribel Franks MD

## 2021-03-26 NOTE — PATIENT INSTRUCTIONS
Transplant Clinic Discharge Instructions    Pulmonary Rehab: Please remember to stay active.  Continue exercises learned in pulmonary rehab or continue participating in pulmonary rehab, if able. We encourage you to try and be active on days that you are not in pulmonary rehab as well. Walking is a great form of exercise. We encourage you to continue weights as well to maintain muscle mass.    Weight Management: You are currently at a healthy weight and we encourage you to maintain in that range.     127 lbs 6.4 oz Body mass index is 21.87 kg/m .  Goal BMI greater than 18, less than 30 for lung transplant.     Medication changes:  None    Follow Up/Next appointment: We do think transplant evaluation is warranted. A few consults and testing we would like completed prior to undergoing the full evaluation.  1. Hepatology Consult - to assess liver disease  2. Follow up with Gynecology with hx of abnormal PAP  3. Chem Dep assessment  4. Social Work assessment  5. Dental appointment as able    Please remember to stay up to date with your primary care requirements including:                Annual check-ups with primary care physician   Mammogram   Pap smear (as appropriate for women)    PSA (for men over 50)   Dental visits   Annual flu shots/ immunizations as needed   Colonoscopy (patients over 50).     Thank-you for allowing us to participate in your care.    Please contact your transplant coordinator, Katlin Marquez at [phone] with any questions, concerns or updates (change in medications, illness, hospital admission, change in oxygen needs, change in insurance coverage, change of phone number or address, etc).    Thoracic Transplant Office phone [phone], fax [phone]    Office Hours 8:30 - 5:00 pm

## 2021-03-26 NOTE — NURSING NOTE
" LUNG TRANSPLANT NEW PATIENT VISIT   Transplant Nurse Coordinator Note    Sofie Rodriguez 60 year old  [id number]    Data Unavailable  127 lbs 6.4 oz  Body mass index is 21.87 kg/m .    Patient accompanied by: Patient came to NPT appointment with her two daughters, Charity and Julia. All were asking good questions and engaged in patient care.    Family emphasized solid support system between three children and six grandchildren and friends around.     Current activity level: Thinks could walk about a block before getting pretty SOB, at two blocks would be \"really huffing and puffing.\" Can walk one flight of stairs but keeps a chair at the top and sits down to rest.  Exercise regimen  Rubber bands - arms and leg muscle exercises  With warmer weather plans to be out walking more as able.    ADLS: Does run errands with son but does not avoid this due to SOB. Mainly lugging around oxygen tanks and needed extras is cumbersome and why she does not go solo.    Pulmonary Rehab: M/W Currently enrolled and has attended a few sessions. Started 3/8/21.  Her 3rd or 4th time participating in pulm rehab.    Karnofsky Score: 70%  PFT: 3/11/20  CT Scan: 20  Echo: 20 w hospitalization    Current oxygen use: Use initiated around  with hospitalization.   Rest  3 L Activity  4-5 L Sleep  Trilogy with 3 L bleed in.  Also uses trilogy PRN during the day.     Diabetic status: Denies, last hgb A1c 5.1 2020  Prednisone: No daily chronic dose, only with exacerbations.  GERD:Denies acid reflex and/or any swallowing difficulties.  Meth use: Last used   Marijuana: Last used   Never completed CD treatment for meth/marijuana. Did undergo CD tx for ETOH use as teenager and in mid 20s.  Smokin20    Primary Care:   Mammogram: 10/2020    ASSESSMENT:   1.  ACR BI-RADS Category 2: Benign  2.  Dense Breast Tissue  3.  TLR = 8.2%, Low Risk Category  PAP: 2019 Negative, HPV negative  Abnormal Pap smear of cervix " 02/20/2017   with High Risk HPV. Atypical Endocervical Cells, Positive HPV   2017 - Leep tx, cervicalCIN III  Colonoscopy: no polyps, 2015 [facility]cc 10 year follow Dental: Two years - Dentures. Full dentures upper and lower. Still with native teeth. Hasn't worn dentures in a year or two.     Vaccinations:  Has received 2 doses of COVID vaccine-Pfizer.  Hepatitis A/B: UTD/Completed series  Pneumovax: UTD 2013  Prevnar 13: UTD 2015  Tdap: UTD  Shingrix: 1st dose 3/12/21    Patient status/transplant tab updated.     Feels relatively stable from a respiratory and activity standpoint over the last year. Has been living with son which has been helpful too (concerns over mold in own home).  No recent weight loss, in fact has gained about 10 lbs.    MD Recommendations:   Continue Pulm Rehab  See Hepatology (liver) MD to assess liver disease risk prior to undergoing full transplant evaluation.  Social work assessment.  Chem Dep assessment.  GYNecology follow up with hx of abnormal PAP smears.  Dental appointment.    Plan: F/U with Dr Franks in 6 months or sooner if condition worsens

## 2021-03-26 NOTE — PROGRESS NOTES
[facility]  Transplant Clinic - Initial Visit -  March 26, 2021         Assessment and Plan:   Sofie Rodriguez is a 60 year old female with history of COPD and bronchiectasis  who is seen today for evaluation for lung transplantation.    1) Evaluation for lung transplantation:  Ms. Rodriguez does have significantly severe obstructive disease. 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 She has a very strong support system with her two daughters making it on the phone visit today. They all asked very insightful and thoughtful questions.   From her report it does sound as if she's had a good year without any exacerbations, but she has required 3 intubations in the past. She has not declined any further this year and it seems like her symptoms may have stabilized after moving out of her old home full of mold. She reports also that her primary pulmonologist also feels she would be a good candidate for transplant.   I do have a few concerns regarding her previous drug use but it certainly sounds like she has no cravings or relapses.  She also has a history of cervical intraepithelial neoplasia for which she underwent a LEEP procedure, she reports she would not need another pap smear for 3-5 years but will follow up.   I would like her to see hepatology for her Hep C follow up and make sure she doesn't have any evidence of fibrosis/cirrhosis and also have a mental health screen to determine need for chem dep vs. neuropsych eval although she may not need either of these.     In addition to a complete history and physical, I discussed transplant at length with the patient.  We reviewed the risks and benefits of transplantation.  Our discussion included the recent  survival statistics.  I discussed rejection, including hyperacute, acute, chronic and antibody mediated.  I discussed the need for surveillance biopsies.  I reviewed the standard immunosuppression and typical side effects, including renal failure.  I discussed the increased risk of infection and the use of prophylactic antibiotics. I reviewed the increased risk of malignancy. I discussed the listing system, including the Lung Allocation Score.  I discussed the typical operative and postoperative course.  I reviewed the usual clinic followup.  I explained to the patient that she is required to stay in the [facility] for three months following transplantation and that she  will need to have someone accompanying her during that time.  I discussed the importance of strict medication and clinic adherence after transplantation. The patient had a number of questions which were answered to her apparent satisfaction.      - Refer to hepatology  - Refer to SW to do a screen regarding her previous use of drugs and mental health anxiety  - Depending on the above we will bring her to transplant committee to discuss pursuing a full eval      Coordinator/MD: Katlin Pizarro/Claribel Franks    RTC:   Influenza and other vaccinations:   Annual dermatology visit:    Claribel Franks MD  [facility]   Pulmonary Transplant   PreTransplant coordinator: Katlin Marquez  Fax: [phone]  Ph: [phone]          Problem List:     1. Evaluation for lung transplantation  2. CAD  a. Chest CT notes multivessel calcifications  3. Hepatitis C: Diagnosed in 1980s, 2 mos of treatment, quant negative since 10/2017, last positive 2/20/17 ([phone])  4. Mental Health  a. Episodes of depression when younger  5. Steroid Induced Hyperglycemia  6. ?Diastolic Dysfunction  7. Hx of colonization with Mycobacterium peregrinum         History of Present Illness:   Sofie Rodriguez is a 60 year old female with history  of COPD/Bronchiectasis who is seen today for evaluation for lung transplantation. She is referred by Dr. Josh Patel, and Pauline Madrigal NP from [facility] with a history of COPD/Bronchiectasis.     She reportedly smoked from a young age and has experienced dyspnea intermittently since 10/2013. She went to a doctor and was found to be 82% on RA and was sent to the hospital where she was diagnosed with COPD and started on continuous oxygen. She was also diagnosed with CHF at the time and was diuresed aggressively. She is usually hospitalized about 1-2 times a year, but her last major hospitalization was in 1/2020, she required intubation during her hospitalization, she states she has been intubated 3 times in the past. She has not required steroids in the last year.   She is attending pulmonary rehab currently, twice weekly, and just started on March 8, 2021 this is her third or fourth time doing it. Since she started her pulm rehab she's realized she can maybe only walk a block before she gets short of breath. She can walk a flight of stairs without stopping but has to stop and take a break at the top.   She is doing strengthening exercises with resistance bands. She can do her ADLs and IADLs without stopping but does get short of breath. She is able to leave the house, but her son drives her.   This last year she's had no hospitalizations and no ER visits. She lives with her son and feels better at home.   She hasn't lost any significant weight, she's actually gained about 10 lbs in the last year.   She has no history of heartburn or acid reflux, denies waterbrash. No reported aspiration symptoms.   During exacerbations she'll feel more tired, and has a more productive cough green in nature, wheezes when sick. She does bring up sputum every day even when sick, although the volume is usually fairly low.     Regarding her drug use history, she self quit meth and marijuana about 2 years ago but does not attend  any meetings or rehab.       Current Meds:  Duonebs couple times a week  Albuterol inhaler uses during pulm rehab  Breo incruse daily  Ellipta incruse daily  [id number]  MV     Oxygen Use:   At rest 3L  Sleep: 3L bleed in with Trilogy, using 3-4 nights/week, will use trilogy during the day few times a week  With activity: 4-5L       Review of Systems:   Please see HPI, otherwise the complete 10 point ROS is negative.           Past Medical and Surgical History:     Past Medical History:   Diagnosis Date     CHF (congestive heart failure) (H)      COPD (chronic obstructive pulmonary disease) (H)      Hepatitis 2017    Hep C, [facility]     HTN (hypertension)      Osteopenia      Past Surgical History:   Procedure Laterality Date     COLONOSCOPY  2015     ENT SURGERY  1974    tonsillectomy     HAND SURGERY       LEEP TX, CERVICAL  04/07/2017    HECTOR III     LYMPH NODE BIOPSY Left 2005    Left axilla, benign- [employer]     Hepatitis C, diagnosed in the 1982. Her viral loads were back up in 2017 and then she was treated for it for 2 months.: Hep C quant negative since 10/2017, last positive 2/20/17 ([phone])        Family History:     Grandpa: MI  Mom: Skin ca, cervical ca  Dad: Prostate ca, CAD, d. At age 82  Brother: COPD (Smoker)  Brother: No medical care  Sister: healthy  Sister: Healthy         Social History:     Tobacco: Cigarettes smoked for 30-35 years 1-1.5ppd, quit in 11/11/20  ETOH: Very seldom, maybe 1-2 times/month bacardi limon or 2 glasses of wine. Reports heavy use as a teenager/young adult. Went through chem dep for alcohol use once in adolescence, and then around ages [age]. 2 DUIs, one in 1998 and then in 2008.   Illicits: Hx of Meth few times per week for about 15-20 years, last used 3/2019ish and marijuana frequently at times daily since age 17, quit around 3/2019ish. Stopped smoking marijuana because she couldn't breathe, the meth she quit b/c she just didn't feel healthy.  Exposures: Her old house  "probably had mold in it which may account for frequency of exacerbations.   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   Pets: None    Social: Single, 3 children, currently living with son, 2 daughters.          Medications:     Current Outpatient Medications   Medication     albuterol (PROAIR HFA/PROVENTIL HFA/VENTOLIN HFA) 108 (90 BASE) MCG/ACT Inhaler     fluticasone-vilanterol (BREO ELLIPTA) 100-25 MCG/INH oral inhaler     hydrochlorothiazide (HYDRODIURIL) 25 MG tablet     ipratropium (ATROVENT) 0.02 % nebulizer solution     lisinopril (PRINIVIL/ZESTRIL) 10 MG tablet     minocycline (DYNACIN) 100 MG tablet     umeclidinium (INCRUSE ELLIPTA) 62.5 MCG/INH oral inhaler     No current facility-administered medications for this visit.             Physical Exam:   Wt 57.8 kg (127 lb 6.4 oz)   Breastfeeding No   BMI 21.87 kg/m       Height 5'3\"  Weight 128 lbs  BMI 22.6    Exam limited by virtual nature of visit    Constitutional - looks well, in no apparent distress  Eyes - no redness or discharge  Respiratory -breathing appears comfortable.  No cough. Speaking in full sentences, not conversationally dyspneic. No accessory muscle use.Wearing oxygen  Skin - No appreciable discoloration or lesions (very limited exam)  Neurological - No apparent tremors. Speech fluent and articlate  Psychiatric - no signs of delirium or anxiety  .         Data:   All laboratory and imaging data reviewed.      No results found for this or any previous visit (from the past 168 hour(s)).      PFT interpretation: March 25, 2021    Date Place TLC (%) FVC (%) FEV1 (%) FEV1/FVC DLCO (%) Note   3/11/20 [facility]   1.66 50 0.45 17 27% 2.78 11 No significant bronchodilatory response   9/24/15 [facility]     0.89 33 17%   No bronchodilatory response                                 6MWT " Distance:       Serology:  CMV:  EBV:  HSV:     Micro:  4/30/2019 MYCOBACTERIUM PEREGRINUM      Labs:   Last A1c 5.1 11/13/2020  Alpha 1 AT: 1/27/21: Level 150           Imaging:       Chest CT w contrast: 1/12/20  Moderate cardiomegaly with left ventricular hypertrophy.  Moderate pericardial  effusion without evidence of compression    IMPRESSION:  Advanced emphysematous changes with likely superimposed interstitial edema.  Small to moderate right and small left pleural effusions with accompanying lower lobe atelectasis.  Moderate pericardial effusion.  Negative for pulmonary embolus.         Cardiac:     Most Recent TTE:  ECHO: 1/12/20  Summary:    * Left ventricular segmental wall motion is normal.    * The estimated ejection fraction is 55-60%.    * The right ventricle is enlarged.    * Reduced right ventricular systolic function.    * There is aortic valve sclerosis.    * There is trace mitral regurgitation.    * There is trace tricuspid regurgitation.    * Borderline pulmonary hypertension, estimated pulmonary arterial Systolic pressure is  39 mmHg.    * The right atrium is mildly enlarged.    * There is small to moderate cirumferential pericardial effusion visualized.    * No hemodynamic findings consistent with tamponade physiology.    * Prior study from 03/01/2019.    * Pericardial effusion is new.    Stress Test/Angiogram:    RHC:          GI:   EGD  [place]         Endo   DEXA:         Health Maintenance     Mammogram: 10/2020    ASSESSMENT:   1.  ACR BI-RADS Category 2: Benign  2.  Dense Breast Tissue  3.  TLR = 8.2%, Low Risk Category     PAP: 07/2019 Negative, HPV negative  Abnormal Pap smear of cervix 02/20/2017   with High Risk HPV. Atypical Endocervical Cells, Positive HPV      Colonoscopy: no polyps, 2015 rec 10 year follow up     Dental: Two years - Dentures. Full dentures upper and lower. Still with native teeth.             I spent 100 minutes between our virtual visit, documentation, and  chart review on 3/26/21.

## 2021-03-29 NOTE — TELEPHONE ENCOUNTER
RECORDS RECEIVED FROM: Internal   Appt Date: 4.2.21   NOTES STATUS DETAILS   OFFICE NOTE from referring provider Internal 3.26.21 RAJ Franks [facility]    OFFICE NOTES from other specialists N/A    DISCHARGE SUMMARY from hospital N/A    MEDICATION LIST Internal    LIVER BIOSPY (IF APPLICABLE)      PATHOLOGY REPORTS  N/A    IMAGING     ENDOSCOPY (IF AVAILABLE) N/A    COLONOSCOPY (IF AVAILABLE) N/A    ULTRASOUND LIVER N/A    CT OF ABDOMEN N/A    MRI OF LIVER N/A    FIBROSCAN, US ELASTOGRAPHY, FIBROSIS SCAN, MR ELASTOGRAPHY N/A    LABS     HEPATIC PANEL (LIVER PANEL) N/A    BASIC METABOLIC PANEL Care Everywhere 11.17.20   COMPLETE METABOLIC PANEL Care Everywhere 3.17.21   COMPLETE BLOOD COUNT (CBC) N/A    INTERNATIONAL NORMALIZED RATIO (INR) N/A    HEPATITIS C ANTIBODY N/A    HEPATITIS C VIRAL LOAD/PCR N/A    HEPATITIS C GENOTYPE N/A    HEPATITIS B SURFACE ANTIGEN N/A    HEPATITIS B SURFACE ANTIBODY N/A    HEPATITIS B DNA QUANT LEVEL N/A    HEPATITIS B CORE ANTIBODY N/A

## 2021-04-02 ENCOUNTER — VIRTUAL VISIT (OUTPATIENT)
Dept: GASTROENTEROLOGY | Facility: CLINIC | Age: 61
End: 2021-04-02
Attending: STUDENT IN AN ORGANIZED HEALTH CARE EDUCATION/TRAINING PROGRAM
Payer: MEDICARE

## 2021-04-02 ENCOUNTER — PRE VISIT (OUTPATIENT)
Dept: GASTROENTEROLOGY | Facility: CLINIC | Age: 61
End: 2021-04-02

## 2021-04-02 DIAGNOSIS — B18.2 CHRONIC HEPATITIS C WITHOUT HEPATIC COMA (H): ICD-10-CM

## 2021-04-02 DIAGNOSIS — Z01.818 ENCOUNTER FOR PRE-TRANSPLANT EVALUATION FOR LUNG TRANSPLANT: ICD-10-CM

## 2021-04-02 DIAGNOSIS — F10.11 HISTORY OF ETOH ABUSE: ICD-10-CM

## 2021-04-02 PROCEDURE — 99205 OFFICE O/P NEW HI 60 MIN: CPT | Mod: 95 | Performed by: STUDENT IN AN ORGANIZED HEALTH CARE EDUCATION/TRAINING PROGRAM

## 2021-04-02 ASSESSMENT — PAIN SCALES - GENERAL: PAINLEVEL: NO PAIN (0)

## 2021-04-02 NOTE — LETTER
"    4/2/2021         RE: Sofie Rodriguez  [address] [place] [address]        Dear Colleague,    Thank you for referring your patient, Sofie Rodriguez, to the [facility] [place]. Please see a copy of my visit note below.    Sofie is a 60 year old who is being evaluated via a billable video visit.      How would you like to obtain your AVS? MyChart  If the video visit is dropped, the invitation should be resent by: Text to cell phone: [phone]  Will anyone else be joining your video visit? No      Video Start Time: 9:04 AM  Video-Visit Details    Type of service:  Video Visit    Video End Time:9:30    Originating Location (pt. Location): Home    Distant Location (provider location):  [facility] [place]     Platform used for Video Visit: [employer]    [facility] New Patient Visit    Date of Visit: April 2, 2021    Reason for referral: History of hepatitis C    Subjective: Ms. Higgins is a 60 year old woman with a history of COPD, bronchiectasis, history of CHC s/p treatment, who presents for evaluation of liver disease in the setting of lung transplant evaluation.     First told she had \"non A non B\" hepatitis in 1982 - presented with jaundice. Never had a liver biopsy.     7/2017 3,410,000  International units/mL, Genotype 2  Treated with 12 weeks Epclusa 2017  Undetectable viral load from 12/2017 on, last checked 7/2018  Hepatitis B Sag negative 2017, SAb non immune    LFTs normal 11/2020     Drinks 1-2 drinks a month at the most. In the past, was drinking more heavily - age [age], was drinking 6 pack a day and then more on weekends.     Denies s/s of liver decompensation    ROS: 14 point ROS negative except for positives noted in HPI.    PMHx:  Past Medical History:   Diagnosis Date     CHF (congestive heart failure) (H)      COPD (chronic obstructive pulmonary disease) (H)      Hepatitis 2017    Hep C, [facility]     HTN " (hypertension)      Osteopenia        PSHx:  Past Surgical History:   Procedure Laterality Date     COLONOSCOPY  2015     ENT SURGERY  1974    tonsillectomy     HAND SURGERY       LEEP TX, CERVICAL  2017    HECTOR III     LYMPH NODE BIOPSY Left 2005    Left axilla, benign- [place]       FamHx:  No family history of liver disease, liver cancer    SocHx:  Social History     Socioeconomic History     Marital status: Single     Spouse name: Not on file     Number of children: Not on file     Years of education: Not on file     Highest education level: Not on file   Occupational History     Not on file   Social Needs     Financial resource strain: Not on file     Food insecurity     Worry: Not on file     Inability: Not on file     Transportation needs     Medical: Not on file     Non-medical: Not on file   Tobacco Use     Smoking status: Former Smoker     Years: 30.00     Types: Cigarettes     Quit date: 2020     Years since quittin.3     Smokeless tobacco: Never Used   Substance and Sexual Activity     Alcohol use: Yes     Frequency: 2-4 times a month     Drug use: Not Currently     Types: Marijuana, Methamphetamines     Comment: hx:marijuana and methamphetamine-quit both unsure ?  2-3 yrs ago     Sexual activity: Not on file   Lifestyle     Physical activity     Days per week: Not on file     Minutes per session: Not on file     Stress: Not on file   Relationships     Social connections     Talks on phone: Not on file     Gets together: Not on file     Attends [employer] service: Not on file     Active member of club or organization: Not on file     Attends meetings of clubs or organizations: Not on file     Relationship status: Not on file     Intimate partner violence     Fear of current or ex partner: Not on file     Emotionally abused: Not on file     Physically abused: Not on file     Forced sexual activity: Not on file   Other Topics Concern     Parent/sibling w/ CABG, MI or angioplasty before 65F  55M? Not Asked   Social History Narrative     Not on file       Medications:  Current Outpatient Medications   Medication     albuterol (PROAIR HFA/PROVENTIL HFA/VENTOLIN HFA) 108 (90 BASE) MCG/ACT Inhaler     fluticasone-vilanterol (BREO ELLIPTA) 100-25 MCG/INH oral inhaler     ipratropium (ATROVENT) 0.02 % nebulizer solution     Multiple Vitamin (QUINTABS) TABS     umeclidinium (INCRUSE ELLIPTA) 62.5 MCG/INH oral inhaler     hydrochlorothiazide (HYDRODIURIL) 25 MG tablet     lisinopril (PRINIVIL/ZESTRIL) 10 MG tablet     minocycline (DYNACIN) 100 MG tablet     No current facility-administered medications for this visit.        Allergies:  No Known Allergies    Objective:  There were no vitals taken for this visit.  Constitutional: pleasant female in NAD, wearing oxygen  Eyes: non icteric  Respiratory: Normal respiratory excursion   MSK: normal range of motion of visualized extremities  Abd: Non distended  Skin: No jaundice  Psychiatric: normal mood and orientation    Labs:  Last Comprehensive Metabolic Panel:  Potassium   Date Value Ref Range Status   11/17/2020 4.9 3.5 - 5.1 mmol/L Final     Glucose   Date Value Ref Range Status   11/17/2020 89 70 - 100 mg/dL Final     Creatinine   Date Value Ref Range Status   01/15/2020 0.67 0.57 - 1.11 mg/dL Final     GFR Estimate   Date Value Ref Range Status   01/15/2020 >60 >=60 ml/min/1.73m2 Final     ALT   Date Value Ref Range Status   11/13/2020 15 8 - 45 U/L Final     AST   Date Value Ref Range Status   11/13/2020 32 5 - 41 U/L Final         INR   Date Value Ref Range Status   01/11/2020 1.0 0.9 - 1.1 Final      Platelet count normal 2021    Imaging:    RUQ US 2017    FINDINGS:  The gallbladder is well delineated.  There are 2 echogenic foci attaching to  the gallbladder walls.  These do not shadow or are mobile.  The remaining  gallbladder is otherwise normal.  The walls are within normal limits for  thickness.  The common bile duct is 3 mm in size, which is normal.   Pancreas is  within normal limits.  The liver show no discrete abnormalities.  There are no  masses.  The right kidney is within normal limits with length of 9.7 cm.    IMPRESSION:  1. Gallbladder polyps.  2. The liver by ultrasound is morphologically normal.  There are no masses.    RUQ US 2016 liver normal    INTERPRETATION:   - Liver:  Liver is normal in size and echotexture.  No focal lesion.  - Gallbladder:  The gallbladder is partially distended and demonstrates a couple small 2-3 mm   nonmobile densities within the neck of the gallbladder which could represent cholesterol   polyps.  Gallbladder wall is otherwise unremarkable.  No definitive cholelithiasis.  No   pericholecystic fluid and negative sonographic Sánchez sign.  Common bile duct is normal   measuring 2 mm without sign of choledocholithiasis.  - Pancreas:  Visualized parts of the pancreas appear unremarkable   - Right kidney:  Normal size and texture without masses or hydronephrosis.  No renal lithiasis   identified.    - Proximal aorta and IVC:  Unremarkable  - Other:  No free fluid is seen in the upper abdomen.    CONCLUSION:  1.  Findings suggestive of gallbladder polyps without secondary findings of active gallbladder   disease.  2.  The remainder of the right upper quadrant ultrasound exam appear otherwise unremarkable   including liver parenchyma.Endoscopy:    Independently reviewed labs and imaging.     Assessment/Plan: Ms. Higgins is a 60 year old woman with a history of COPD, bronchiectasis, history of CHC s/p treatment, who presents for evaluation of liver disease in the setting of lung transplant evaluation.     She has a history of CHC for ~ 25 years s/p treatment with SVR. Remote history of heavy alcohol use, not currently.     Her liver imaging has been normal, but she is at risk for fibrosis given the duration of her CHC. APRI score is 0.325 - low risk for fibrosis, but FIB4 indeterminate     - Recommend evaluation of fibrosis with MR  elastography  - Will check for hepatitis B core total antibody given risks of reactivation after transplant with immunosuppression.     RTC PRN based on MRE    Vani Adan MD MS  Hepatology/Liver Transplant  [facility]

## 2021-04-02 NOTE — PROGRESS NOTES
"Sofie is a 60 year old who is being evaluated via a billable video visit.      How would you like to obtain your AVS? MyChart  If the video visit is dropped, the invitation should be resent by: Text to cell phone: [phone]  Will anyone else be joining your video visit? No      Video Start Time: 9:04 AM  Video-Visit Details    Type of service:  Video Visit    Video End Time:9:30    Originating Location (pt. Location): Home    Distant Location (provider location):  [facility] [place]     Platform used for Video Visit: [employer]    [facility] New Patient Visit    Date of Visit: April 2, 2021    Reason for referral: History of hepatitis C    Subjective: Ms. Higgins is a 60 year old woman with a history of COPD, bronchiectasis, history of CHC s/p treatment, who presents for evaluation of liver disease in the setting of lung transplant evaluation.     First told she had \"non A non B\" hepatitis in 1982 - presented with jaundice. Never had a liver biopsy.     7/2017 3,410,000  International units/mL, Genotype 2  Treated with 12 weeks Epclusa 2017  Undetectable viral load from 12/2017 on, last checked 7/2018  Hepatitis B Sag negative 2017, SAb non immune    LFTs normal 11/2020     Drinks 1-2 drinks a month at the most. In the past, was drinking more heavily - age [age], was drinking 6 pack a day and then more on weekends.     Denies s/s of liver decompensation    ROS: 14 point ROS negative except for positives noted in HPI.    PMHx:  Past Medical History:   Diagnosis Date     CHF (congestive heart failure) (H)      COPD (chronic obstructive pulmonary disease) (H)      Hepatitis 2017    Hep C, [facility]     HTN (hypertension)      Osteopenia        PSHx:  Past Surgical History:   Procedure Laterality Date     COLONOSCOPY  2015     ENT SURGERY  1974    tonsillectomy     HAND SURGERY       LEEP TX, CERVICAL  04/07/2017    HECTOR III     LYMPH NODE BIOPSY Left 2005    Left " axilla, benign- [place]       FamHx:  No family history of liver disease, liver cancer    SocHx:  Social History     Socioeconomic History     Marital status: Single     Spouse name: Not on file     Number of children: Not on file     Years of education: Not on file     Highest education level: Not on file   Occupational History     Not on file   Social Needs     Financial resource strain: Not on file     Food insecurity     Worry: Not on file     Inability: Not on file     Transportation needs     Medical: Not on file     Non-medical: Not on file   Tobacco Use     Smoking status: Former Smoker     Years: 30.00     Types: Cigarettes     Quit date: 2020     Years since quittin.3     Smokeless tobacco: Never Used   Substance and Sexual Activity     Alcohol use: Yes     Frequency: 2-4 times a month     Drug use: Not Currently     Types: Marijuana, Methamphetamines     Comment: hx:marijuana and methamphetamine-quit both unsure ?  2-3 yrs ago     Sexual activity: Not on file   Lifestyle     Physical activity     Days per week: Not on file     Minutes per session: Not on file     Stress: Not on file   Relationships     Social connections     Talks on phone: Not on file     Gets together: Not on file     Attends Restorationist service: Not on file     Active member of club or organization: Not on file     Attends meetings of clubs or organizations: Not on file     Relationship status: Not on file     Intimate partner violence     Fear of current or ex partner: Not on file     Emotionally abused: Not on file     Physically abused: Not on file     Forced sexual activity: Not on file   Other Topics Concern     Parent/sibling w/ CABG, MI or angioplasty before 65F 55M? Not Asked   Social History Narrative     Not on file       Medications:  Current Outpatient Medications   Medication     albuterol (PROAIR HFA/PROVENTIL HFA/VENTOLIN HFA) 108 (90 BASE) MCG/ACT Inhaler     fluticasone-vilanterol (BREO ELLIPTA) 100-25  MCG/INH oral inhaler     ipratropium (ATROVENT) 0.02 % nebulizer solution     Multiple Vitamin (QUINTABS) TABS     umeclidinium (INCRUSE ELLIPTA) 62.5 MCG/INH oral inhaler     hydrochlorothiazide (HYDRODIURIL) 25 MG tablet     lisinopril (PRINIVIL/ZESTRIL) 10 MG tablet     minocycline (DYNACIN) 100 MG tablet     No current facility-administered medications for this visit.        Allergies:  No Known Allergies    Objective:  There were no vitals taken for this visit.  Constitutional: pleasant female in NAD, wearing oxygen  Eyes: non icteric  Respiratory: Normal respiratory excursion   MSK: normal range of motion of visualized extremities  Abd: Non distended  Skin: No jaundice  Psychiatric: normal mood and orientation    Labs:  Last Comprehensive Metabolic Panel:  Potassium   Date Value Ref Range Status   11/17/2020 4.9 3.5 - 5.1 mmol/L Final     Glucose   Date Value Ref Range Status   11/17/2020 89 70 - 100 mg/dL Final     Creatinine   Date Value Ref Range Status   01/15/2020 0.67 0.57 - 1.11 mg/dL Final     GFR Estimate   Date Value Ref Range Status   01/15/2020 >60 >=60 ml/min/1.73m2 Final     ALT   Date Value Ref Range Status   11/13/2020 15 8 - 45 U/L Final     AST   Date Value Ref Range Status   11/13/2020 32 5 - 41 U/L Final         INR   Date Value Ref Range Status   01/11/2020 1.0 0.9 - 1.1 Final      Platelet count normal 2021    Imaging:    RUQ US 2017    FINDINGS:  The gallbladder is well delineated.  There are 2 echogenic foci attaching to  the gallbladder walls.  These do not shadow or are mobile.  The remaining  gallbladder is otherwise normal.  The walls are within normal limits for  thickness.  The common bile duct is 3 mm in size, which is normal.  Pancreas is  within normal limits.  The liver show no discrete abnormalities.  There are no  masses.  The right kidney is within normal limits with length of 9.7 cm.    IMPRESSION:  1. Gallbladder polyps.  2. The liver by ultrasound is morphologically  normal.  There are no masses.    RUQ US 2016 liver normal    INTERPRETATION:   - Liver:  Liver is normal in size and echotexture.  No focal lesion.  - Gallbladder:  The gallbladder is partially distended and demonstrates a couple small 2-3 mm   nonmobile densities within the neck of the gallbladder which could represent cholesterol   polyps.  Gallbladder wall is otherwise unremarkable.  No definitive cholelithiasis.  No   pericholecystic fluid and negative sonographic Sánchez sign.  Common bile duct is normal   measuring 2 mm without sign of choledocholithiasis.  - Pancreas:  Visualized parts of the pancreas appear unremarkable   - Right kidney:  Normal size and texture without masses or hydronephrosis.  No renal lithiasis   identified.    - Proximal aorta and IVC:  Unremarkable  - Other:  No free fluid is seen in the upper abdomen.    CONCLUSION:  1.  Findings suggestive of gallbladder polyps without secondary findings of active gallbladder   disease.  2.  The remainder of the right upper quadrant ultrasound exam appear otherwise unremarkable   including liver parenchyma.Endoscopy:    Independently reviewed labs and imaging.     Assessment/Plan: Ms. Higgins is a 60 year old woman with a history of COPD, bronchiectasis, history of CHC s/p treatment, who presents for evaluation of liver disease in the setting of lung transplant evaluation.     She has a history of CHC for ~ 25 years s/p treatment with SVR. Remote history of heavy alcohol use, not currently.     Her liver imaging has been normal, but she is at risk for fibrosis given the duration of her CHC. APRI score is 0.325 - low risk for fibrosis, but FIB4 indeterminate     - Recommend evaluation of fibrosis with MR elastography  - Will check for hepatitis B core total antibody given risks of reactivation after transplant with immunosuppression.     RTC PRN based on MRE    Vani Adan MD MS  Hepatology/Liver Transplant  [facility]

## 2021-04-14 DIAGNOSIS — Z01.818 ENCOUNTER FOR PRE-TRANSPLANT EVALUATION FOR LUNG TRANSPLANT: Primary | ICD-10-CM

## 2021-04-14 DIAGNOSIS — Z51.81 ENCOUNTER FOR THERAPEUTIC DRUG LEVEL MONITORING: ICD-10-CM

## 2021-04-14 DIAGNOSIS — Z11.59 ENCOUNTER FOR SCREENING FOR OTHER VIRAL DISEASES: ICD-10-CM

## 2021-04-14 DIAGNOSIS — R93.89 ABNORMAL FINDING OF DIAGNOSTIC IMAGING: ICD-10-CM

## 2021-04-14 DIAGNOSIS — Z79.51 LONG TERM (CURRENT) USE OF INHALED STEROIDS: ICD-10-CM

## 2021-04-14 DIAGNOSIS — R06.02 SHORTNESS OF BREATH: ICD-10-CM

## 2021-04-14 DIAGNOSIS — F10.11 ALCOHOL ABUSE, IN REMISSION: ICD-10-CM

## 2021-04-14 DIAGNOSIS — R07.9 CHEST PAIN: ICD-10-CM

## 2021-04-14 DIAGNOSIS — Z76.82 ORGAN TRANSPLANT CANDIDATE: ICD-10-CM

## 2021-04-14 DIAGNOSIS — J44.9 COPD (CHRONIC OBSTRUCTIVE PULMONARY DISEASE) (H): ICD-10-CM

## 2021-04-14 RX ORDER — SODIUM CHLORIDE 9 MG/ML
INJECTION, SOLUTION INTRAVENOUS CONTINUOUS
Status: CANCELLED | OUTPATIENT
Start: 2021-04-14

## 2021-04-14 RX ORDER — POTASSIUM CHLORIDE 1500 MG/1
40 TABLET, EXTENDED RELEASE ORAL
Status: CANCELLED | OUTPATIENT
Start: 2021-04-14

## 2021-04-14 RX ORDER — POTASSIUM CHLORIDE 1500 MG/1
20 TABLET, EXTENDED RELEASE ORAL
Status: CANCELLED | OUTPATIENT
Start: 2021-04-14

## 2021-04-14 RX ORDER — LIDOCAINE 40 MG/G
CREAM TOPICAL
Status: CANCELLED | OUTPATIENT
Start: 2021-04-14

## 2021-04-20 ENCOUNTER — TELEPHONE (OUTPATIENT)
Dept: GASTROENTEROLOGY | Facility: CLINIC | Age: 61
End: 2021-04-20

## 2021-04-20 NOTE — TELEPHONE ENCOUNTER
Patient is scheduled for ESOPHAGEAL MANOMETRY    Spoke with: LITZY FROM TRANSPLANT    Date of Procedure: 06/03/2021    Location: [facility]    Sedation Type CS    Conscious Sedation- Needs  for 6 hours after the procedure  MAC/General-Needs  for 24 hours after procedure    Pre-op for Unit J MAC and OR NOT NEEDED    (if yes advise patient they will need a pre-op prior to procedure)      Is patient on blood thinners? -UNKNOWN (If yes- inform patient to follow up with PCP or provider for follow up instructions)     Informed patient they will need an adult  YES  Cannot take any type of public or medical transportation alone    Informed Patient of COVID Test Requirement YES    Preferred Pharmacy for Pre Prescription NA    Confirmed Nurse will call to complete assessment YES    Additional comments: NA

## 2021-04-26 ENCOUNTER — TELEPHONE (OUTPATIENT)
Dept: TRANSPLANT | Facility: CLINIC | Age: 61
End: 2021-04-26

## 2021-04-26 NOTE — TELEPHONE ENCOUNTER
Returned call to Sofie. Confirmed she does not need a pre MRI covid test. Reviewed NPO 6 hrs pre scan. Reviewed tentative evaluation schedule. Some adjustments requested and have sent to our . Discussed parking rates/options.

## 2021-04-27 ENCOUNTER — ANCILLARY PROCEDURE (OUTPATIENT)
Dept: MRI IMAGING | Facility: CLINIC | Age: 61
End: 2021-04-27
Attending: STUDENT IN AN ORGANIZED HEALTH CARE EDUCATION/TRAINING PROGRAM
Payer: MEDICARE

## 2021-04-27 DIAGNOSIS — B18.2 CHRONIC HEPATITIS C WITHOUT HEPATIC COMA (H): ICD-10-CM

## 2021-04-27 DIAGNOSIS — F10.11 HISTORY OF ETOH ABUSE: ICD-10-CM

## 2021-04-27 PROCEDURE — 74181 MRI ABDOMEN W/O CONTRAST: CPT | Mod: MG | Performed by: RADIOLOGY

## 2021-04-27 PROCEDURE — G1004 CDSM NDSC: HCPCS | Mod: GC | Performed by: RADIOLOGY

## 2021-05-05 DIAGNOSIS — Z11.59 ENCOUNTER FOR SCREENING FOR OTHER VIRAL DISEASES: ICD-10-CM

## 2021-05-12 ENCOUNTER — PATIENT OUTREACH (OUTPATIENT)
Dept: GASTROENTEROLOGY | Facility: CLINIC | Age: 61
End: 2021-05-12

## 2021-05-13 DIAGNOSIS — Z11.59 ENCOUNTER FOR SCREENING FOR OTHER VIRAL DISEASES: ICD-10-CM

## 2021-05-14 ENCOUNTER — ALLIED HEALTH/NURSE VISIT (OUTPATIENT)
Dept: TRANSPLANT | Facility: CLINIC | Age: 61
End: 2021-05-14
Attending: INTERNAL MEDICINE
Payer: MEDICARE

## 2021-05-14 DIAGNOSIS — J44.9 CHRONIC OBSTRUCTIVE PULMONARY DISEASE, UNSPECIFIED COPD TYPE (H): ICD-10-CM

## 2021-05-14 DIAGNOSIS — J44.9 COPD (CHRONIC OBSTRUCTIVE PULMONARY DISEASE) (H): ICD-10-CM

## 2021-05-14 DIAGNOSIS — Z01.818 ENCOUNTER FOR PRE-TRANSPLANT EVALUATION FOR LUNG TRANSPLANT: ICD-10-CM

## 2021-05-14 DIAGNOSIS — Z76.82 ORGAN TRANSPLANT CANDIDATE: ICD-10-CM

## 2021-05-14 PROCEDURE — 999N000103 HC STATISTIC NO CHARGE FACILITY FEE: Mod: GT

## 2021-05-14 NOTE — NURSING NOTE
Reviewed evaluation schedule for next week, answered any questions.    Discussed shuttle use between hospital/clinic.  Discussed access to  parking from either hospital or clinic.       Confirmed that oxygen is available while at the clinic and hospital, patient needs own oxygen for travel to and from facility and for hotel if from out of town.     Heart Catheterization and coronary angiogram discussed scheduled for 5/18/21 with daughter Julia being  home day of. Sofie is not diabetic and will continued with her 81 mg aspirin daily including day of procedure. She will remain NPO at 0000, clear liquids up until few hours day of procedure.  Labs to be drawn pre procedure. Encouraged fluids post procedure.

## 2021-05-14 NOTE — NURSING NOTE
Patient and daughter, Julia came to the pre lung class, content per mobile[employer] videos outline. They were attentive, stated understanding, and asked good questions. They were given all relevant handouts (have not received and have requested our admin staff resend docusign and also will have our SW provide paper copy during in personal eval next week)    Required signatures will be obtained and forms are scanned to EMR.  Addressed patient questions and concerns regarding transplant.    Discussed donor offers including increased risk, and DCD donors.     Discussed physician and coordinator management pre to post lung transplant.        Will review with Lung Transplant Team for transplant candidacy when evaluation complete.      Pt and family were encouraged to create login/password for [employer] to continue to view videos to reinforce education.

## 2021-05-17 ENCOUNTER — TELEPHONE (OUTPATIENT)
Dept: CARDIOLOGY | Facility: CLINIC | Age: 61
End: 2021-05-17

## 2021-05-17 ENCOUNTER — TELEPHONE (OUTPATIENT)
Dept: TRANSPLANT | Facility: CLINIC | Age: 61
End: 2021-05-17

## 2021-05-17 NOTE — TELEPHONE ENCOUNTER
Call complete for pre procedure reminder, travel screen and updated visitor policy.  Patient states that she has been having fevers for the last week, with the highest today at 102.8.  Cause is unknown. She was hospitalized for a suspected pneumonia, but after discharge has continued to have fevers with a high of 102.8 in the last 24 hours.  Transplant coordinator notified; she will reach out to patient and potentially reschedule her tests.  COVID Negative

## 2021-05-17 NOTE — TELEPHONE ENCOUNTER
Returned call to Sofie, she reports feeling poorly over the weekend with fevers to 102.8, turned up oxygen from 2-3 L due to increased SOB. Very recently in hospital and treated for pna, completed antibiotics this past Friday. Sofie actually in the waiting room of her PCP at time of call.  She is scheduled for first day of lung transplant evaluation tomorrow which entails heart catheterization and coronary angiogram. Sofie wanting to proceed however discussed rescheduling is most appropriate as it sounds like she likely needs a work up and continued treatment and is best to undergo evaluation not during acute illness. Sofie quite disappointed but I assured her we can reschedule in a timely manner once she has recovered. Sofie will call back with update in condition.    Update 5/18- Placed back on antibiotics for unresolved pna. Sofie will discuss with daughter and contact coordinator to reschedule txp evaluation in June.

## 2021-05-25 ENCOUNTER — VIRTUAL VISIT (OUTPATIENT)
Dept: PALLIATIVE CARE | Facility: CLINIC | Age: 61
End: 2021-05-25
Attending: INTERNAL MEDICINE
Payer: MEDICARE

## 2021-05-25 DIAGNOSIS — J43.1 PANLOBULAR EMPHYSEMA (H): Primary | ICD-10-CM

## 2021-05-25 DIAGNOSIS — Z76.82 ORGAN TRANSPLANT CANDIDATE: ICD-10-CM

## 2021-05-25 DIAGNOSIS — Z01.818 ENCOUNTER FOR PRE-TRANSPLANT EVALUATION FOR LUNG TRANSPLANT: ICD-10-CM

## 2021-05-25 PROCEDURE — 999N001193 HC VIDEO/TELEPHONE VISIT; NO CHARGE

## 2021-05-25 PROCEDURE — 99205 OFFICE O/P NEW HI 60 MIN: CPT | Mod: 95 | Performed by: INTERNAL MEDICINE

## 2021-05-25 NOTE — PROGRESS NOTES
Palliative Care Outpatient Clinic    (This note was transcribed using voice recognition software. While I review and edit the transcription, I may miss errors, and the software sometimes does unexpected capitalizations and formatting that I miss. Please let me know of any serious mistranscriptions and I will addend this note.)    Patient ID:  Medical - She has severe COPD & bronchiectasis; 3 lpm O2 + Trilogy at night. FEV1 0.45 (17%) in 2020.    Social - Lives with son in [facility]. Heavy tobacco history; AUD and methamphetamine use disorders in recovery. 2 adult daughters too.     Care Planning - She has a 'living will'; names her daughters as her agents she reports. She reports she makes decisions mostly independently.       History:  History gathered today from: patient, medical chart, outside records including Care Everywhere (scans, PFTs)    I discussed with them the role of palliative care in seeing patients who are being evaluated for lung transplantation.  We talked about preparedness planning.      I had an extensive discussion about what the complications can look like sometimes after transplantation.  This included chronic ventilator dependence and needing tracheostomy and long-term ventilation including sometimes LTAC placement, other serious comorbidities including becoming severely debilitated after transplant, often including multiple infections as well, leading to the patient being unable to return to living independently in the community, but instead spending time in facilities, in and out of the hospital and going back and forth between hospital and rehab facilities, without making any sort of real recovery (to being able to live outside of a facility), and this going on for many months or even over a year.  When this happens, occasionally patients do make good a recovery eventually long-term, but most often patients end up dying without ever having being able to return to independent living in  "the community.     Discussed with them that when this happens sometimes we can talk with the patient him or herself about what to do but other times we are really relying on family decision makers which can make these difficult decisions, of when enough is enough, and what sort of burdensome treatments someone would be willing to go through for a low but really uncertain chance of good recovery.    She presents as being well informed as to the possibilities for complications. Eg 5y survival is ~50%, and has been thinking of the discussions she's had about complications. She generally expresses a positive attitude \"I just tell myself I'm going to be in the good percentage\" and determination to recover through a transplant even if she has challenging complications.    We discussed essentials/what is absolutely necessary for an acceptable recovery and she indicates it's really her mind/ability to know and communicate normally/well with people she loves. She thinks she could accept permanent/severe physical disability, even living in a nursing home permanently, as long as her mind/communication were present.       Also discussed patients often have a lot of mucus, and constriction, around the time of transplant causing anxiety that can be quite problematic.  Can cause a lot of anxiety coughing and dyspnea which can lead a lot of suffering as well as complicate their recovery course.  Talked with them about how they typically have managed anxiety in the past including anxiety related to dyspnea and any concern they might have about doing that in the hospitalized setting. Discussed any history of chemical coping.    She discusses generally feeling scared about the transplant. Eg she feels a strong sense of self-efficacy about managing her current state of health and the transplant is so much known. We discuss how while she has severe COPD her health is somewhat stable recently and she's trying to parse the risks of " transplant with taking her chances with hoping she hangs on in her current state of health with her COPD for years.       PE: There were no vitals taken for this visit.   Wt Readings from Last 3 Encounters:   03/26/21 57.8 kg (127 lb 6.4 oz)   02/03/21 54.4 kg (120 lb)   06/09/17 56.5 kg (124 lb 9 oz)     Wearing O2 NAD  Clear sensorium full affect  Speaking full sentences no cough      Data reviewed:  I reviewed recent labs and imaging, my comments:  FEV1 17%  CT 2020 with advanced bilat emphysematous changes     database reviewed:          Impression & Recommendations:  61 yo with severe COPD being evaluated for lung txp    Extensive, disease specific preparedness planning as above.  She presents as knowledgeable and thoughtful about these matters.  I encouraged her to have a clear discussion with her 3 kids about her wishes as summarized above.  She has a HCD at home    Follow-up prn    65 minutes spent on the date of the encounter doing chart review, history and exam, patient education & counseling, documentation and other activities as noted above.    Thank you for involving us in the patient's care.   Chester Thibodeaux MD / Palliative Medicine / Daljit rogel via [employer].      Video Start Time:   Video-Visit Details    Type of service:  Video Visit    Video End Time:    Originating Location (pt. Location): Home    Distant Location (provider location):  [facility]     Platform used for Video Visit: [email or web address]

## 2021-05-25 NOTE — LETTER
5/25/2021       RE: Sofie Rodriguez  [address] [place] [address]     Dear Colleague,    Thank you for referring your patient, Sofie Rodriguez, [facility]. Please see a copy of my visit note below.    Palliative Care Outpatient Clinic    (This note was transcribed using voice recognition software. While I review and edit the transcription, I may miss errors, and the software sometimes does unexpected capitalizations and formatting that I miss. Please let me know of any serious mistranscriptions and I will addend this note.)    Patient ID:  Medical - She has severe COPD & bronchiectasis; 3 lpm O2 + Trilogy at night. FEV1 0.45 (17%) in 2020.    Social - Lives with son in [facility]. Heavy tobacco history; AUD and methamphetamine use disorders in recovery. 2 adult daughters too.     Care Planning - She has a 'living will'; names her daughters as her agents she reports. She reports she makes decisions mostly independently.       History:  History gathered today from: patient, medical chart, outside records including Care Everywhere (scans, PFTs)    I discussed with them the role of palliative care in seeing patients who are being evaluated for lung transplantation.  We talked about preparedness planning.      I had an extensive discussion about what the complications can look like sometimes after transplantation.  This included chronic ventilator dependence and needing tracheostomy and long-term ventilation including sometimes LTAC placement, other serious comorbidities including becoming severely debilitated after transplant, often including multiple infections as well, leading to the patient being unable to return to living independently in the community, but instead spending time in facilities, in and out of the hospital and going back and forth between hospital and rehab facilities, without making any sort of  "real recovery (to being able to live outside of a facility), and this going on for many months or even over a year.  When this happens, occasionally patients do make good a recovery eventually long-term, but most often patients end up dying without ever having being able to return to independent living in the community.     Discussed with them that when this happens sometimes we can talk with the patient him or herself about what to do but other times we are really relying on family decision makers which can make these difficult decisions, of when enough is enough, and what sort of burdensome treatments someone would be willing to go through for a low but really uncertain chance of good recovery.    She presents as being well informed as to the possibilities for complications. Eg 5y survival is ~50%, and has been thinking of the discussions she's had about complications. She generally expresses a positive attitude \"I just tell myself I'm going to be in the good percentage\" and determination to recover through a transplant even if she has challenging complications.    We discussed essentials/what is absolutely necessary for an acceptable recovery and she indicates it's really her mind/ability to know and communicate normally/well with people she loves. She thinks she could accept permanent/severe physical disability, even living in a nursing home permanently, as long as her mind/communication were present.       Also discussed patients often have a lot of mucus, and constriction, around the time of transplant causing anxiety that can be quite problematic.  Can cause a lot of anxiety coughing and dyspnea which can lead a lot of suffering as well as complicate their recovery course.  Talked with them about how they typically have managed anxiety in the past including anxiety related to dyspnea and any concern they might have about doing that in the hospitalized setting. Discussed any history of chemical coping.    She " discusses generally feeling scared about the transplant. Eg she feels a strong sense of self-efficacy about managing her current state of health and the transplant is so much known. We discuss how while she has severe COPD her health is somewhat stable recently and she's trying to parse the risks of transplant with taking her chances with hoping she hangs on in her current state of health with her COPD for years.       PE: There were no vitals taken for this visit.   Wt Readings from Last 3 Encounters:   03/26/21 57.8 kg (127 lb 6.4 oz)   02/03/21 54.4 kg (120 lb)   06/09/17 56.5 kg (124 lb 9 oz)     Wearing O2 NAD  Clear sensorium full affect  Speaking full sentences no cough      Data reviewed:  I reviewed recent labs and imaging, my comments:  FEV1 17%  CT 2020 with advanced bilat emphysematous changes     database reviewed:          Impression & Recommendations:  61 yo with severe COPD being evaluated for lung txp    Extensive, disease specific preparedness planning as above.  She presents as knowledgeable and thoughtful about these matters.  I encouraged her to have a clear discussion with her 3 kids about her wishes as summarized above.  She has a HCD at home    Follow-up prn    65 minutes spent on the date of the encounter doing chart review, history and exam, patient education & counseling, documentation and other activities as noted above.    Thank you for involving us in the patient's care.   Chester Thibodeaux MD / Palliative Medicine / Text me via [facility].      Sofie is a 60 year old who is being evaluated via a billable video visit.      How would you like to obtain your AVS? MyChart  If the video visit is dropped, the invitation should be resent by: Send to e-mail at: [email or web address]  Will anyone else be joining your video visit? Palma RONQUILLO

## 2021-05-25 NOTE — PROGRESS NOTES
Sofie is a 60 year old who is being evaluated via a billable video visit.      How would you like to obtain your AVS? MyChart  If the video visit is dropped, the invitation should be resent by: Send to e-mail at: [email or web address]  Will anyone else be joining your video visit? Palma RONQUILLO

## 2021-06-02 ENCOUNTER — TELEPHONE (OUTPATIENT)
Dept: GASTROENTEROLOGY | Facility: CLINIC | Age: 61
End: 2021-06-02

## 2021-06-02 DIAGNOSIS — Z11.59 ENCOUNTER FOR SCREENING FOR OTHER VIRAL DISEASES: ICD-10-CM

## 2021-06-11 ENCOUNTER — PATIENT OUTREACH (OUTPATIENT)
Dept: GASTROENTEROLOGY | Facility: CLINIC | Age: 61
End: 2021-06-11

## 2021-06-14 ENCOUNTER — ANCILLARY PROCEDURE (OUTPATIENT)
Dept: GENERAL RADIOLOGY | Facility: CLINIC | Age: 61
End: 2021-06-14
Attending: INTERNAL MEDICINE
Payer: MEDICARE

## 2021-06-14 ENCOUNTER — ALLIED HEALTH/NURSE VISIT (OUTPATIENT)
Dept: TRANSPLANT | Facility: CLINIC | Age: 61
End: 2021-06-14
Attending: INTERNAL MEDICINE
Payer: MEDICARE

## 2021-06-14 ENCOUNTER — ANCILLARY PROCEDURE (OUTPATIENT)
Dept: BONE DENSITY | Facility: CLINIC | Age: 61
End: 2021-06-14
Attending: INTERNAL MEDICINE
Payer: MEDICARE

## 2021-06-14 ENCOUNTER — ANCILLARY PROCEDURE (OUTPATIENT)
Dept: CT IMAGING | Facility: CLINIC | Age: 61
End: 2021-06-14
Attending: INTERNAL MEDICINE
Payer: MEDICARE

## 2021-06-14 DIAGNOSIS — Z01.818 ENCOUNTER FOR PRE-TRANSPLANT EVALUATION FOR LUNG TRANSPLANT: ICD-10-CM

## 2021-06-14 DIAGNOSIS — J44.9 COPD (CHRONIC OBSTRUCTIVE PULMONARY DISEASE) (H): ICD-10-CM

## 2021-06-14 DIAGNOSIS — Z79.51 LONG TERM (CURRENT) USE OF INHALED STEROIDS: ICD-10-CM

## 2021-06-14 DIAGNOSIS — Z76.82 ORGAN TRANSPLANT CANDIDATE: ICD-10-CM

## 2021-06-14 DIAGNOSIS — R06.02 SHORTNESS OF BREATH: ICD-10-CM

## 2021-06-14 DIAGNOSIS — Z76.82 LUNG TRANSPLANT CANDIDATE: Primary | ICD-10-CM

## 2021-06-14 DIAGNOSIS — F10.11 ALCOHOL ABUSE, IN REMISSION: ICD-10-CM

## 2021-06-14 DIAGNOSIS — Z51.81 ENCOUNTER FOR THERAPEUTIC DRUG LEVEL MONITORING: ICD-10-CM

## 2021-06-14 DIAGNOSIS — Z11.59 ENCOUNTER FOR SCREENING FOR OTHER VIRAL DISEASES: ICD-10-CM

## 2021-06-14 DIAGNOSIS — Z11.59 ENCOUNTER FOR SCREENING FOR OTHER VIRAL DISEASES: Primary | ICD-10-CM

## 2021-06-14 DIAGNOSIS — J44.9 COPD (CHRONIC OBSTRUCTIVE PULMONARY DISEASE) (H): Primary | ICD-10-CM

## 2021-06-14 LAB
6 MIN WALK (FT): 860 FT
6 MIN WALK (M): 262 M
ABO + RH BLD: NORMAL
ALBUMIN SERPL-MCNC: 3.7 G/DL (ref 3.4–5)
ALBUMIN UR-MCNC: NEGATIVE MG/DL
ALP SERPL-CCNC: 87 U/L (ref 40–150)
ALT SERPL W P-5'-P-CCNC: 18 U/L (ref 0–50)
AMYLASE SERPL-CCNC: 85 U/L (ref 30–110)
ANION GAP SERPL CALCULATED.3IONS-SCNC: 4 MMOL/L (ref 3–14)
APPEARANCE UR: ABNORMAL
APTT PPP: 28 SEC (ref 22–37)
AST SERPL W P-5'-P-CCNC: 20 U/L (ref 0–45)
BACTERIA SPEC CULT: ABNORMAL
BASE EXCESS BLDV CALC-SCNC: 8.6 MMOL/L
BASOPHILS # BLD AUTO: 0 10E9/L (ref 0–0.2)
BASOPHILS NFR BLD AUTO: 0.4 %
BILIRUB SERPL-MCNC: 0.3 MG/DL (ref 0.2–1.3)
BILIRUB UR QL STRIP: NEGATIVE
BLD GP AB SCN SERPL QL: NORMAL
BLOOD BANK CMNT PATIENT-IMP: NORMAL
BUN SERPL-MCNC: 19 MG/DL (ref 7–30)
CALCIUM SERPL-MCNC: 9.2 MG/DL (ref 8.5–10.1)
CHLORIDE SERPL-SCNC: 103 MMOL/L (ref 94–109)
CO2 SERPL-SCNC: 36 MMOL/L (ref 20–32)
COLOR UR AUTO: YELLOW
CREAT SERPL-MCNC: 0.74 MG/DL (ref 0.52–1.04)
DEPRECATED CALCIDIOL+CALCIFEROL SERPL-MC: 47 UG/L (ref 20–75)
DIFFERENTIAL METHOD BLD: ABNORMAL
EOSINOPHIL # BLD AUTO: 0.7 10E9/L (ref 0–0.7)
EOSINOPHIL NFR BLD AUTO: 13.2 %
ERYTHROCYTE [DISTWIDTH] IN BLOOD BY AUTOMATED COUNT: 12.8 % (ref 10–15)
FUNGUS SPEC CULT: ABNORMAL
GFR SERPL CREATININE-BSD FRML MDRD: 88 ML/MIN/{1.73_M2}
GLUCOSE SERPL-MCNC: 91 MG/DL (ref 70–99)
GLUCOSE UR STRIP-MCNC: NEGATIVE MG/DL
GRAM STN SPEC: ABNORMAL
HAV IGG SER QL IA: REACTIVE
HBV CORE AB SERPL QL IA: NONREACTIVE
HBV SURFACE AB SERPL IA-ACNC: 1.18 M[IU]/ML
HBV SURFACE AG SERPL QL IA: NONREACTIVE
HCO3 BLDV-SCNC: 38 MMOL/L (ref 21–28)
HCT VFR BLD AUTO: 40.5 % (ref 35–47)
HGB BLD-MCNC: 12.5 G/DL (ref 11.7–15.7)
HGB UR QL STRIP: NEGATIVE
HIV 1+2 AB+HIV1 P24 AG SERPL QL IA: NONREACTIVE
HYALINE CASTS #/AREA URNS LPF: 1 /LPF (ref 0–2)
IMM GRANULOCYTES # BLD: 0 10E9/L (ref 0–0.4)
IMM GRANULOCYTES NFR BLD: 0.8 %
INR PPP: 0.93 (ref 0.86–1.14)
KETONES UR STRIP-MCNC: NEGATIVE MG/DL
LEUKOCYTE ESTERASE UR QL STRIP: ABNORMAL
LYMPHOCYTES # BLD AUTO: 1 10E9/L (ref 0.8–5.3)
LYMPHOCYTES NFR BLD AUTO: 18.7 %
Lab: ABNORMAL
MAGNESIUM SERPL-MCNC: 2.4 MG/DL (ref 1.6–2.3)
MCH RBC QN AUTO: 31.4 PG (ref 26.5–33)
MCHC RBC AUTO-ENTMCNC: 30.9 G/DL (ref 31.5–36.5)
MCV RBC AUTO: 102 FL (ref 78–100)
MONOCYTES # BLD AUTO: 0.5 10E9/L (ref 0–1.3)
MONOCYTES NFR BLD AUTO: 9 %
MUCOUS THREADS #/AREA URNS LPF: PRESENT /LPF
NEUTROPHILS # BLD AUTO: 3 10E9/L (ref 1.6–8.3)
NEUTROPHILS NFR BLD AUTO: 57.9 %
NITRATE UR QL: NEGATIVE
NRBC # BLD AUTO: 0 10*3/UL
NRBC BLD AUTO-RTO: 0 /100
O2/TOTAL GAS SETTING VFR VENT: ABNORMAL %
OXYHGB MFR BLDV: 40 %
PCO2 BLDV: 82 MM HG (ref 40–50)
PH BLDV: 7.28 PH (ref 7.32–7.43)
PH UR STRIP: 5 PH (ref 5–7)
PHOSPHATE SERPL-MCNC: 3.2 MG/DL (ref 2.5–4.5)
PLATELET # BLD AUTO: 215 10E9/L (ref 150–450)
PO2 BLDV: 25 MM HG (ref 25–47)
POTASSIUM SERPL-SCNC: 4.2 MMOL/L (ref 3.4–5.3)
PREALB SERPL IA-MCNC: 23 MG/DL (ref 15–45)
PROT SERPL-MCNC: 7.8 G/DL (ref 6.8–8.8)
RBC # BLD AUTO: 3.98 10E12/L (ref 3.8–5.2)
RBC #/AREA URNS AUTO: <1 /HPF (ref 0–2)
SODIUM SERPL-SCNC: 143 MMOL/L (ref 133–144)
SOURCE: ABNORMAL
SP GR UR STRIP: 1.02 (ref 1–1.03)
SPECIMEN EXP DATE BLD: NORMAL
SPECIMEN EXP DATE BLD: NORMAL
SPECIMEN SOURCE: ABNORMAL
SQUAMOUS #/AREA URNS AUTO: <1 /HPF (ref 0–1)
T GONDII IGG SER QL: <3 IU/ML (ref 0–7.1)
UROBILINOGEN UR STRIP-MCNC: 0 MG/DL (ref 0–2)
WBC # BLD AUTO: 5.2 10E9/L (ref 4–11)
WBC #/AREA URNS AUTO: 8 /HPF (ref 0–5)

## 2021-06-14 PROCEDURE — 82785 ASSAY OF IGE: CPT | Performed by: INTERNAL MEDICINE

## 2021-06-14 PROCEDURE — 86481 TB AG RESPONSE T-CELL SUSP: CPT | Performed by: INTERNAL MEDICINE

## 2021-06-14 PROCEDURE — 85025 COMPLETE CBC W/AUTO DIFF WBC: CPT | Performed by: PATHOLOGY

## 2021-06-14 PROCEDURE — 86901 BLOOD TYPING SEROLOGIC RH(D): CPT | Performed by: PATHOLOGY

## 2021-06-14 PROCEDURE — 86850 RBC ANTIBODY SCREEN: CPT | Performed by: PATHOLOGY

## 2021-06-14 PROCEDURE — 81001 URINALYSIS AUTO W/SCOPE: CPT | Performed by: PATHOLOGY

## 2021-06-14 PROCEDURE — 82784 ASSAY IGA/IGD/IGG/IGM EACH: CPT | Performed by: INTERNAL MEDICINE

## 2021-06-14 PROCEDURE — 82657 ENZYME CELL ACTIVITY: CPT | Mod: 90 | Performed by: PATHOLOGY

## 2021-06-14 PROCEDURE — 94618 PULMONARY STRESS TESTING: CPT | Performed by: INTERNAL MEDICINE

## 2021-06-14 PROCEDURE — 86777 TOXOPLASMA ANTIBODY: CPT | Performed by: INTERNAL MEDICINE

## 2021-06-14 PROCEDURE — 83735 ASSAY OF MAGNESIUM: CPT | Performed by: PATHOLOGY

## 2021-06-14 PROCEDURE — 82150 ASSAY OF AMYLASE: CPT | Performed by: PATHOLOGY

## 2021-06-14 PROCEDURE — 84100 ASSAY OF PHOSPHORUS: CPT | Performed by: PATHOLOGY

## 2021-06-14 PROCEDURE — 86708 HEPATITIS A ANTIBODY: CPT | Performed by: INTERNAL MEDICINE

## 2021-06-14 PROCEDURE — 71250 CT THORAX DX C-: CPT | Mod: MG | Performed by: RADIOLOGY

## 2021-06-14 PROCEDURE — 72070 X-RAY EXAM THORAC SPINE 2VWS: CPT | Performed by: STUDENT IN AN ORGANIZED HEALTH CARE EDUCATION/TRAINING PROGRAM

## 2021-06-14 PROCEDURE — 86787 VARICELLA-ZOSTER ANTIBODY: CPT | Performed by: INTERNAL MEDICINE

## 2021-06-14 PROCEDURE — 94060 EVALUATION OF WHEEZING: CPT | Performed by: INTERNAL MEDICINE

## 2021-06-14 PROCEDURE — 84134 ASSAY OF PREALBUMIN: CPT | Performed by: PATHOLOGY

## 2021-06-14 PROCEDURE — 82805 BLOOD GASES W/O2 SATURATION: CPT | Performed by: PATHOLOGY

## 2021-06-14 PROCEDURE — 86704 HEP B CORE ANTIBODY TOTAL: CPT | Performed by: INTERNAL MEDICINE

## 2021-06-14 PROCEDURE — 86803 HEPATITIS C AB TEST: CPT | Performed by: INTERNAL MEDICINE

## 2021-06-14 PROCEDURE — 80323 ALKALOIDS NOS: CPT | Mod: 90 | Performed by: PATHOLOGY

## 2021-06-14 PROCEDURE — 87205 SMEAR GRAM STAIN: CPT | Performed by: INTERNAL MEDICINE

## 2021-06-14 PROCEDURE — 94729 DIFFUSING CAPACITY: CPT | Performed by: INTERNAL MEDICINE

## 2021-06-14 PROCEDURE — 999N001110 HC STATISTIC HIV 1/2 ANTIGEN/ANTIBODY PRETRANSPLANT ONLY: Performed by: INTERNAL MEDICINE

## 2021-06-14 PROCEDURE — 77080 DXA BONE DENSITY AXIAL: CPT | Performed by: INTERNAL MEDICINE

## 2021-06-14 PROCEDURE — 86706 HEP B SURFACE ANTIBODY: CPT | Performed by: INTERNAL MEDICINE

## 2021-06-14 PROCEDURE — 94726 PLETHYSMOGRAPHY LUNG VOLUMES: CPT | Performed by: INTERNAL MEDICINE

## 2021-06-14 PROCEDURE — 82787 IGG 1 2 3 OR 4 EACH: CPT | Performed by: INTERNAL MEDICINE

## 2021-06-14 PROCEDURE — 86644 CMV ANTIBODY: CPT | Performed by: INTERNAL MEDICINE

## 2021-06-14 PROCEDURE — 72100 X-RAY EXAM L-S SPINE 2/3 VWS: CPT | Performed by: RADIOLOGY

## 2021-06-14 PROCEDURE — 85730 THROMBOPLASTIN TIME PARTIAL: CPT | Performed by: PATHOLOGY

## 2021-06-14 PROCEDURE — 87015 SPECIMEN INFECT AGNT CONCNTJ: CPT | Mod: 90 | Performed by: PATHOLOGY

## 2021-06-14 PROCEDURE — 87340 HEPATITIS B SURFACE AG IA: CPT | Performed by: INTERNAL MEDICINE

## 2021-06-14 PROCEDURE — 71045 X-RAY EXAM CHEST 1 VIEW: CPT | Mod: XE | Performed by: RADIOLOGY

## 2021-06-14 PROCEDURE — 87206 SMEAR FLUORESCENT/ACID STAI: CPT | Performed by: PATHOLOGY

## 2021-06-14 PROCEDURE — 86695 HERPES SIMPLEX TYPE 1 TEST: CPT | Performed by: INTERNAL MEDICINE

## 2021-06-14 PROCEDURE — 73522 X-RAY EXAM HIPS BI 3-4 VIEWS: CPT | Performed by: RADIOLOGY

## 2021-06-14 PROCEDURE — 87522 HEPATITIS C REVRS TRNSCRPJ: CPT | Performed by: INTERNAL MEDICINE

## 2021-06-14 PROCEDURE — 85610 PROTHROMBIN TIME: CPT | Performed by: PATHOLOGY

## 2021-06-14 PROCEDURE — 86886 COOMBS TEST INDIRECT TITER: CPT | Performed by: INTERNAL MEDICINE

## 2021-06-14 PROCEDURE — 80053 COMPREHEN METABOLIC PANEL: CPT | Performed by: PATHOLOGY

## 2021-06-14 PROCEDURE — 82306 VITAMIN D 25 HYDROXY: CPT | Performed by: INTERNAL MEDICINE

## 2021-06-14 PROCEDURE — 36416 COLLJ CAPILLARY BLOOD SPEC: CPT | Performed by: PATHOLOGY

## 2021-06-14 PROCEDURE — 82955 ASSAY OF G6PD ENZYME: CPT | Mod: 90 | Performed by: PATHOLOGY

## 2021-06-14 PROCEDURE — 86665 EPSTEIN-BARR CAPSID VCA: CPT | Performed by: INTERNAL MEDICINE

## 2021-06-14 PROCEDURE — 87116 MYCOBACTERIA CULTURE: CPT | Performed by: PATHOLOGY

## 2021-06-14 PROCEDURE — 86900 BLOOD TYPING SEROLOGIC ABO: CPT | Performed by: PATHOLOGY

## 2021-06-14 PROCEDURE — G1004 CDSM NDSC: HCPCS | Performed by: RADIOLOGY

## 2021-06-14 PROCEDURE — 80321 ALCOHOLS BIOMARKERS 1OR 2: CPT | Mod: 90 | Performed by: PATHOLOGY

## 2021-06-14 PROCEDURE — 71047 X-RAY EXAM CHEST 3 VIEWS: CPT | Performed by: RADIOLOGY

## 2021-06-14 PROCEDURE — 86696 HERPES SIMPLEX TYPE 2 TEST: CPT | Performed by: INTERNAL MEDICINE

## 2021-06-14 ASSESSMENT — ANXIETY QUESTIONNAIRES
GAD7 TOTAL SCORE: 0
3. WORRYING TOO MUCH ABOUT DIFFERENT THINGS: NOT AT ALL
IF YOU CHECKED OFF ANY PROBLEMS ON THIS QUESTIONNAIRE, HOW DIFFICULT HAVE THESE PROBLEMS MADE IT FOR YOU TO DO YOUR WORK, TAKE CARE OF THINGS AT HOME, OR GET ALONG WITH OTHER PEOPLE: NOT DIFFICULT AT ALL
1. FEELING NERVOUS, ANXIOUS, OR ON EDGE: NOT AT ALL
6. BECOMING EASILY ANNOYED OR IRRITABLE: NOT AT ALL
2. NOT BEING ABLE TO STOP OR CONTROL WORRYING: NOT AT ALL
7. FEELING AFRAID AS IF SOMETHING AWFUL MIGHT HAPPEN: NOT AT ALL
5. BEING SO RESTLESS THAT IT IS HARD TO SIT STILL: NOT AT ALL

## 2021-06-14 ASSESSMENT — PATIENT HEALTH QUESTIONNAIRE - PHQ9
5. POOR APPETITE OR OVEREATING: NOT AT ALL
SUM OF ALL RESPONSES TO PHQ QUESTIONS 1-9: 1

## 2021-06-14 NOTE — PROGRESS NOTES
Patient Name: Sofie Rodriguez  : 1960  Age: 61 year old  MRN: [id number]  Date of Initial Social Work Evaluation: 2021    Patient undergoing formal lung transplant evaluation.  I saw her previously.   Saw today to update psychosocial assessment and to meet with her caregivers to discuss post-op caregiver plan.   Met today with patient and 2 cousins, Dorinda and Kathie.    Presenting Information   Living Situation: currently living with her son, Gera, in split level single family home in [place], [place]  If not local, plans for short term stay:  [facility] vs. One of her cousin's home.    Functional Status: can do light housework.  Needs 5l of O2 with activity  Cultural/Language/Spiritual Considerations: n/a    Support System  Primary Support Person sonGera  Other support:  2 other adult children  Charity (41) Julia (25) both live in [facility].  3 cousins Dorinda, Kathie and Mohini.  All live in [facility] and are willing to share in care.    Plan for support in immediate post-transplant period: discussed plan in detail today.  3 cousins were planning to share in care.  Dornida is retired, so possibly could provide bulk of care, but in our meeting today, she verbalized being very overwhelmed.  Unsure she is up to task.  Encouraged patient and family to do a 'mock run' of coverage to make sure they could manage.  Prior to listing, I would want to have private conversation with each cousin to confirm willingness/availability    Health Care Directive  Decision Maker: patient  Alternate Decision Maker: adult children  Health Care Directive: Provided education, encouraged completion    Mental Health/Coping:   History of Mental Health: denies depression or anxiety.  No hx of mental health.  PHQ-9 = 1,  NIRANJAN-7=0  History of Chemical Health: Yes  Smoked marijuana and meth amphetamine regularly for 30 years.  Quit cold turkey around 2 + years ago.  CD treatment x3 in past without success.  Longest period of sobriety until  now, was less than a year. Credits success this time by cutting off ties with using friends. Excellent support from family. Verbalizes commitment to sobriety.   1 drink/ 2 times per month until April.  I instructed her to quit on April 8th. Has not had drink since that time.   Current status: as above  Coping: distraction, support from family   Services Needed/Recommended: n/a    Financial   Income: SSD  Impact of transplant on income: none  Insurance and medication coverage: Medicare and Medical Assistance  Financial concerns: worried about cost of lodging.  Encouraged her to talk to financial worker at [facility].  Medicaid should cover bulk of cost.    Resources needed: Medicaid coverage of lodging.      Education provided by : Social Work role in transplant program, availability of support groups, parking information and lodging info    Assessment and recommendations and plan:  Caregiver plan remains a concern.  Unclear that cousins are still committed.  Will need to talk to each privately to confirm.  Otherwise, no psychosocial contraindications.

## 2021-06-15 ENCOUNTER — OFFICE VISIT (OUTPATIENT)
Dept: GASTROENTEROLOGY | Facility: CLINIC | Age: 61
End: 2021-06-15
Payer: MEDICARE

## 2021-06-15 VITALS
SYSTOLIC BLOOD PRESSURE: 140 MMHG | TEMPERATURE: 98.2 F | HEIGHT: 63 IN | HEART RATE: 94 BPM | BODY MASS INDEX: 23.92 KG/M2 | OXYGEN SATURATION: 98 % | DIASTOLIC BLOOD PRESSURE: 79 MMHG | RESPIRATION RATE: 16 BRPM | WEIGHT: 135 LBS

## 2021-06-15 DIAGNOSIS — Z01.818 ENCOUNTER FOR PRE-TRANSPLANT EVALUATION FOR LUNG TRANSPLANT: Primary | ICD-10-CM

## 2021-06-15 LAB
CMV IGG SERPL QL IA: >8 AI (ref 0–0.8)
EBV VCA IGG SER QL IA: >8 AI (ref 0–0.8)
GAMMA INTERFERON BACKGROUND BLD IA-ACNC: 0 IU/ML
HCV AB SERPL QL IA: REACTIVE
HSV1 IGG SERPL QL IA: 0.3 AI (ref 0–0.8)
HSV2 IGG SERPL QL IA: 1.9 AI (ref 0–0.8)
IGA SERPL-MCNC: 151 MG/DL (ref 84–499)
IGE SERPL-ACNC: 14 KIU/L (ref 0–114)
IGG SERPL-MCNC: 1284 MG/DL (ref 610–1616)
IGG1 SER-MCNC: 823 MG/DL (ref 382–929)
IGG2 SER-MCNC: 201 MG/DL (ref 242–700)
IGG3 SER-MCNC: 148 MG/DL (ref 22–176)
IGG4 SER-MCNC: 28 MG/DL (ref 4–86)
IGM SERPL-MCNC: 57 MG/DL (ref 35–242)
M TB IFN-G CD4+ BCKGRND COR BLD-ACNC: 10 IU/ML
M TB TUBERC IFN-G BLD QL: NEGATIVE
MISCELLANEOUS TEST: NORMAL
MITOGEN IGNF BCKGRD COR BLD-ACNC: 0.03 IU/ML
MITOGEN IGNF BCKGRD COR BLD-ACNC: 0.04 IU/ML
VZV IGG SER QL IA: >8 AI (ref 0–0.8)

## 2021-06-15 PROCEDURE — 91010 ESOPHAGUS MOTILITY STUDY: CPT

## 2021-06-15 PROCEDURE — 91038 ESOPH IMPED FUNCT TEST > 1HR: CPT

## 2021-06-15 ASSESSMENT — ANXIETY QUESTIONNAIRES: GAD7 TOTAL SCORE: 0

## 2021-06-15 ASSESSMENT — PAIN SCALES - GENERAL: PAINLEVEL: NO PAIN (0)

## 2021-06-15 ASSESSMENT — MIFFLIN-ST. JEOR: SCORE: 1156.49

## 2021-06-15 NOTE — PROGRESS NOTES
Non-Invasive GI Procedure Visit    Sofie Rodriguez is a 61 year old female with history of Data Unavailable.   Patient stated reason for procedure: Lung Transplant Evaluation  COVID-19 Test Performed within 48-72hrs of the procedure:  Yes. COVID-19 result was NEGATIVE.    COVID-19 PCR Results    COVID-19 PCR Results 5/8/21 5/14/21 6/11/21   COVID-19 Virus by PCR (External Result) Negative NEG Negative         COVID-19 Antibody Results, Testing for Immunity    COVID-19 Antibody Results, Testing for Immunity   No data to display.             Pre-Procedure Assessment  Patient presents to clinic today for Esophageal Manometry Study with pH    Referring Provider: Dr Franks  Patient has not undergone previous endoscopy.    Does patient report taking a PPI (omeprazole, pantoprazole, rabeprazole, lansoprazole, esomeprazole, dexlansoprazole)? No  Does patient report taking a H2 blocker (ranitidine, or famotidine)? No  Does patient report taking opioids? No  Patient reported that last food and/or drink was last consumed 8   hours ago.  Esophageal Questionnaire(s) Completed:   Yes - Esophageal Questionnaire(s)    BEDQ Questionnaire  BEDQ Questionnaire: How Often Have You Had the Following? 6/15/2021   Trouble eating solid food (meat, bread, vegetables) 0   Trouble eating soft foods (yogurt, jello, pudding) 0   Trouble swallowing liquids 0   Pain while swallowing 0   Coughing or choking while swallowing foods or liquids 0   Total Score: 0     BEDQ Questionnaire: Discomfort/Pain Ratings 6/15/2021   Eating solid food (meat, bread, vegetables) 0   Eating soft foods (yogurt, jello, pudding) 0   Drinking liquid 0   Total Score: 0       Eckardt Questionnaire  Eckardt Questionnaire 6/15/2021   Dysphagia 0   Regurgitation 0   Retrosternal Pain 0   Weight Loss (kg) 0   Total Score:  0       Promis 10 Questionnaire  PROMIS 10 FLOWSHEET DATA 6/15/2021   In general, would you say your health is: 3   In general, would you say your quality of  "life is: 3   In general, how would you rate your physical health? 3   In general, how would you rate your mental health, including your mood and your ability to think? 4   In general, how would you rate your satisfaction with your social activities and relationships? 3   In general, please rate how well you carry out your usual social activities and roles. (This includes activities at home, at work and in your community, and responsibilities as a parent, child, spouse, employee, friend, etc.) 3   To what extent are you able to carry out your everyday physical activities such as walking, climbing stairs, carrying groceries, or moving a chair? 3   In the past 7 days, how often have you been bothered by emotional problems such as feeling anxious, depressed, or irritable? 1   In the past 7 days, how would you rate your fatigue on average? 2   In the past 7 days, how would you rate your pain on average, where 0 means no pain, and 10 means worst imaginable pain? 0   Mental health question re-calculation - no clinical value 5   Physical health question re-calculation - no clinical value 4   Pain question re-calculation - no clinical value 5   Global Mental Health Score 15   Global Physical Health Score 15   PROMIS TOTAL - SUBSCORES 30     .    Patient Hx  Patient's history, medications and allergies were reviewed.     Height: 5' 3\"   Weight: 135 lbs 0 oz    Patient Active Problem List    Diagnosis Date Noted     COPD (chronic obstructive pulmonary disease) (H) 04/14/2021     Priority: Medium     Added automatically from request for surgery [id number]       Encounter for pre-transplant evaluation for lung transplant 04/14/2021     Priority: Medium     Added automatically from request for surgery [id number]        Prior to Admission medications    Medication Sig Start Date End Date Taking? Authorizing Provider   albuterol (PROAIR HFA/PROVENTIL HFA/VENTOLIN HFA) 108 (90 BASE) MCG/ACT Inhaler Inhale 2 puffs into the lungs every 6 " hours as needed for shortness of breath / dyspnea or wheezing    Reported, Patient   fluticasone-vilanterol (BREO ELLIPTA) 100-25 MCG/INH oral inhaler Inhale 1 puff into the lungs daily 25 mcg    Reported, Patient   ipratropium (ATROVENT) 0.02 % nebulizer solution Take 0.5 mg by nebulization daily 0.5md daily    Reported, Patient   Multiple Vitamin (QUINTABS) TABS Take 1 tablet by mouth daily    Reported, Patient   umeclidinium (INCRUSE ELLIPTA) 62.5 MCG/INH oral inhaler Inhale 1 puff into the lungs daily 62.5mcg    Reported, Patient     No Known Allergies  Past Medical History:   Diagnosis Date     CHF (congestive heart failure) (H)      COPD (chronic obstructive pulmonary disease) (H)      Hepatitis 2017    Hep C, [facility]     HTN (hypertension)      Osteopenia      Past Surgical History:   Procedure Laterality Date     COLONOSCOPY       ENT SURGERY  1974    tonsillectomy     HAND SURGERY       LEEP TX, CERVICAL  2017    HECTOR III     LYMPH NODE BIOPSY Left 2005    Left axilla, benign- [place]     No family history on file.  Social History     Tobacco Use     Smoking status: Former Smoker     Years: 30.00     Types: Cigarettes     Quit date: 2020     Years since quittin.5     Smokeless tobacco: Never Used   Substance Use Topics     Alcohol use: Yes     Frequency: 2-4 times a month        Pre-Procedure Education & Consent  Procedure education was provided to: Patient  Teaching method: Explanation  Barriers to learning: No Barrier    Patient indicated understanding of pre-procedure instruction and appropriate consent was obtained and documented.    ____________________________________________________________________    Post-Procedure Documentation: GI Esophageal Manometry with 24 Hour Ph    Manometry catheter was placed via right nare to 52 cm and normal saline swallows given per protocol. Manometry catheter was removed at the end of test and the pH cathter was placed via right nare to 38  cm.      Diary and discharge instructions given to patient and patent instructed to return tomorrow for catheter removal.    Notification of pending test results sent to provider for interpretation. Please reference scanned document for final interpretation of results. Patient will follow up with referring provider for test results.    Rain Yarbrough RN on 6/15/2021 at 8:46 AM

## 2021-06-15 NOTE — PATIENT INSTRUCTIONS
Esophogeal Manometry with pH  1. Resume regular diet.  2. You may have a bloody nose or sore throat after the procedure.  3. You had a 24-hour probe placed, return the next day to have the probe removed.  Removal will take 5 minutes.  4. If you have questions call [phone] from 7:00am-5:00pm.  For afterhours questions call GI doctor on call at [phone].

## 2021-06-16 ENCOUNTER — ALLIED HEALTH/NURSE VISIT (OUTPATIENT)
Dept: GASTROENTEROLOGY | Facility: CLINIC | Age: 61
End: 2021-06-16
Payer: MEDICARE

## 2021-06-16 DIAGNOSIS — Z01.818 ENCOUNTER FOR PRE-TRANSPLANT EVALUATION FOR LUNG TRANSPLANT: Primary | ICD-10-CM

## 2021-06-16 LAB
ACID FAST STN SPEC QL: NORMAL
ACID FAST STN SPEC QL: NORMAL
BLD GP AB SCN TITR SERPL: NORMAL {TITER}
DLCOCOR-%PRED-PRE: 32 %
DLCOCOR-PRE: 6.45 ML/MIN/MMHG
DLCOUNC-%PRED-PRE: 31 %
DLCOUNC-PRE: 6.27 ML/MIN/MMHG
DLCOUNC-PRED: 19.62 ML/MIN/MMHG
ERV-%PRED-PRE: 130 %
ERV-PRE: 0.91 L
ERV-PRED: 0.69 L
EXPTIME-PRE: 15.89 SEC
FEF2575-%PRED-POST: 6 %
FEF2575-%PRED-PRE: 6 %
FEF2575-POST: 0.14 L/SEC
FEF2575-PRE: 0.15 L/SEC
FEF2575-PRED: 2.27 L/SEC
FEFMAX-%PRED-PRE: 35 %
FEFMAX-PRE: 2.23 L/SEC
FEFMAX-PRED: 6.19 L/SEC
FEV1-%PRED-PRE: 21 %
FEV1-PRE: 0.54 L
FEV1FEV6-PRE: 40 %
FEV1FEV6-PRED: 81 %
FEV1FVC-PRE: 27 %
FEV1FVC-PRED: 80 %
FEV1SVC-PRE: 25 %
FEV1SVC-PRED: 77 %
FIFMAX-PRE: 1.93 L/SEC
FRCPLETH-%PRED-PRE: 213 %
FRCPLETH-PRE: 5.65 L
FRCPLETH-PRED: 2.64 L
FVC-%PRED-PRE: 64 %
FVC-PRE: 2 L
FVC-PRED: 3.09 L
G6PD RBC-CCNC: 14.1 U/G HB (ref 9.9–16.6)
HCV RNA SERPL NAA+PROBE-ACNC: NORMAL [IU]/ML
HCV RNA SERPL NAA+PROBE-LOG IU: NORMAL LOG IU/ML
IC-%PRED-PRE: 48 %
IC-PRE: 1.2 L
IC-PRED: 2.47 L
RVPLETH-%PRED-PRE: 257 %
RVPLETH-PRE: 4.74 L
RVPLETH-PRED: 1.84 L
SPECIMEN SOURCE: NORMAL
TLCPLETH-%PRED-PRE: 143 %
TLCPLETH-PRE: 6.85 L
TLCPLETH-PRED: 4.77 L
TPMT BLD-CCNC: 27 U/ML (ref 24–44)
VA-%PRED-PRE: 79 %
VA-PRE: 3.69 L
VC-%PRED-PRE: 66 %
VC-PRE: 2.11 L
VC-PRED: 3.17 L

## 2021-06-16 PROCEDURE — 99207 PR NO BILLABLE SERVICE THIS VISIT: CPT

## 2021-06-17 LAB
A*: NORMAL
A*LOCUS SEROLOGIC EQUIVALENT: 1
A*LOCUS: NORMAL
A*SEROLOGIC EQUIVALENT: 2
AB TEST METHOD: NORMAL
B*: NORMAL
B*LOCUS SEROLOGIC EQUIVALENT: 37
B*LOCUS: NORMAL
B*SEROLOGIC EQUIVALENT: 51
BW-1: NORMAL
C*: NORMAL
C*LOCUS SEROLOGIC EQUIVALENT: 6
C*LOCUS: NORMAL
C*SEROLOGIC EQUIVALENT: 15
DPA1*: NORMAL
DPB1*: NORMAL
DPB1*LOCUS NMDP: NORMAL
DPB1*LOCUS: NORMAL
DPB1*NMDP: NORMAL
DQA1*: NORMAL
DQA1*LOCUS: NORMAL
DQB1*: NORMAL
DQB1*LOCUS SEROLOGIC EQUIVALENT: 5
DQB1*LOCUS: NORMAL
DQB1*SEROLOGIC EQUIVALENT: 6
DRB1*: NORMAL
DRB1*LOCUS SEROLOGIC EQUIVALENT: 10
DRB1*LOCUS: NORMAL
DRB1*SEROLOGIC EQUIVALENT: 13
DRB3*LOCUS SEROLOGIC EQUIVALENT: 52
DRB3*LOCUS: NORMAL
DRSSO TEST METHOD: NORMAL
LAB SCANNED RESULT: NORMAL
PROTOCOL CUTOFF: NORMAL
SA1 CELL: NORMAL
SA1 COMMENTS: NORMAL
SA1 HI RISK ABY: NORMAL
SA1 MOD RISK ABY: NORMAL
SA1 TEST METHOD: NORMAL
SA2 CELL: NORMAL
SA2 COMMENTS: NORMAL
SA2 HI RISK ABY UA: NORMAL
SA2 MOD RISK ABY: NORMAL
SA2 TEST METHOD: NORMAL
UNACCEPTABLE ANTIGEN: NORMAL
UNOS CPRA: 0

## 2021-06-18 ENCOUNTER — TELEPHONE (OUTPATIENT)
Dept: TRANSPLANT | Facility: CLINIC | Age: 61
End: 2021-06-18

## 2021-06-18 RX ORDER — ASPIRIN 81 MG/1
81 TABLET ORAL DAILY
Status: ON HOLD | COMMUNITY
End: 2022-06-28

## 2021-06-22 LAB — PETH BLD-MCNC: NEGATIVE NG/ML

## 2021-06-23 ENCOUNTER — TELEPHONE (OUTPATIENT)
Dept: TRANSPLANT | Facility: CLINIC | Age: 61
End: 2021-06-23

## 2021-06-23 NOTE — TELEPHONE ENCOUNTER
Called patient to explain that Dr Rivas appt on Thurs, July 1 needed to be rescheduled, she confirmed for Wed, June 30 at 330pm w/Hester

## 2021-06-24 PROBLEM — R93.89 ABNORMAL FINDINGS ON DIAGNOSTIC IMAGING OF CARDIOVASCULAR SYSTEM: Status: ACTIVE | Noted: 2021-04-14

## 2021-06-24 LAB
COTININE SERPL-MCNC: <1 NG/ML
NICOTINE SERPL-MCNC: <1 NG/ML

## 2021-06-29 ENCOUNTER — TELEPHONE (OUTPATIENT)
Dept: CARDIOLOGY | Facility: CLINIC | Age: 61
End: 2021-06-29

## 2021-06-29 NOTE — PROGRESS NOTES
THORACIC SURGERY - NEW PATIENT OFFICE VISIT      Dear Dr. Berrios,    I saw Jennifer at Dr. Franks s request in consultation for the evaluation of candidacy for lung transplant due to COPD and Bronchiectasis.    HPI  Ms. Sofie Rodriguez is a 61 year old woman with end stage lung disease secondary to COPD and bronchiectasis.                                     Previsit Tests   PFT (6/14/2021): FEV1- 0.54 (21%), DLCO- 31%  6-minute walk test (6/14/2021): 262 m, NURIA index- 6 (57% estimated 4 year survival)  Quantitative Lung perfusion scan: Scheduled  CXR (6/14/2021): hyperinflated lungs    CT Chest (6/14/2021): bilateral pulmonary nodules, possible neoplasm vs confluent increased scarring with destructive emphysema    ECHO (4/14/2021): report not available  Cornonary Angio: scheduled    PMH    Past Medical History:   Diagnosis Date     CHF (congestive heart failure) (H)      COPD (chronic obstructive pulmonary disease) (H)      Hepatitis 2017    Hep C, [facility]     HTN (hypertension)      Osteopenia         PSH    Past Surgical History:   Procedure Laterality Date     COLONOSCOPY  2015     ENT SURGERY  1974    tonsillectomy     HAND SURGERY       LEEP TX, CERVICAL  04/07/2017    HECTOR III     LYMPH NODE BIOPSY Left 2005    Left axilla, benign- [place]        ETOH***  TOB***    Physical examination  BMI- 23.91    From a personal perspective, ***    IMPRESSION No diagnosis found.   61 year old year-old female with ***    PLAN  I spent a total of *** minutes with Sofie Rodriguez. I reviewed the plan as follows:  Procedure planned: ***.  Consent: ***  Necessary Preop Tests & Appointments: ***  Regional Anesthesia Plan: ***  Anticoagulation Plan: ***  Smoking Cessation: ***  Ms. Rodriguez was counseled on the importance of smoking cessation and its impact on the vinayak-operative course. {tscessation:398027} This guideline is in accordance with the World Health Organization (WHO) and has shown to lower the risk of both surgical and  anesthesia complications as well as improve post-operative outcomes.     All questions were answered and Sofie CONTRERAS Jennifer and present family were in agreement with the plan.  I appreciate the opportunity to participate in the care of your patient and will keep you updated.  Sincerely,    JESSIKA ANGULO MD     {[facility] 2021 Documentation (Optional):550041}  {2021 E&M time (Optional):427645}  {Provider  Link to [facility] Help Grid :382563}

## 2021-06-29 NOTE — TELEPHONE ENCOUNTER
Call complete for pre procedure reminder, travel screen and updated visitor policy.  COVID tested  Yesterday at 9am [facility] in [facility]. states fax number was provided.

## 2021-06-30 ENCOUNTER — HOSPITAL ENCOUNTER (OUTPATIENT)
Facility: CLINIC | Age: 61
Discharge: HOME | End: 2021-06-30
Attending: INTERNAL MEDICINE | Admitting: INTERNAL MEDICINE
Payer: MEDICARE

## 2021-06-30 ENCOUNTER — RESEARCH ENCOUNTER (OUTPATIENT)
Dept: CARDIOLOGY | Facility: CLINIC | Age: 61
End: 2021-06-30

## 2021-06-30 ENCOUNTER — APPOINTMENT (OUTPATIENT)
Dept: MEDSURG UNIT | Facility: CLINIC | Age: 61
End: 2021-06-30
Attending: INTERNAL MEDICINE
Payer: MEDICARE

## 2021-06-30 ENCOUNTER — HOSPITAL ENCOUNTER (OUTPATIENT)
Dept: CARDIOLOGY | Facility: CLINIC | Age: 61
End: 2021-06-30
Attending: INTERNAL MEDICINE
Payer: MEDICARE

## 2021-06-30 ENCOUNTER — APPOINTMENT (OUTPATIENT)
Dept: LAB | Facility: CLINIC | Age: 61
End: 2021-06-30
Attending: INTERNAL MEDICINE
Payer: MEDICARE

## 2021-06-30 ENCOUNTER — HOSPITAL ENCOUNTER (OUTPATIENT)
Dept: NUCLEAR MEDICINE | Facility: CLINIC | Age: 61
Setting detail: NUCLEAR MEDICINE
Discharge: HOME | End: 2021-06-30
Attending: INTERNAL MEDICINE | Admitting: INTERNAL MEDICINE
Payer: MEDICARE

## 2021-06-30 ENCOUNTER — OFFICE VISIT (OUTPATIENT)
Dept: SURGERY | Facility: CLINIC | Age: 61
End: 2021-06-30
Attending: INTERNAL MEDICINE
Payer: MEDICARE

## 2021-06-30 VITALS
HEIGHT: 63 IN | WEIGHT: 148.7 LBS | TEMPERATURE: 98.8 F | OXYGEN SATURATION: 95 % | DIASTOLIC BLOOD PRESSURE: 74 MMHG | BODY MASS INDEX: 26.35 KG/M2 | SYSTOLIC BLOOD PRESSURE: 140 MMHG | HEART RATE: 94 BPM

## 2021-06-30 VITALS
HEIGHT: 63 IN | TEMPERATURE: 98.7 F | SYSTOLIC BLOOD PRESSURE: 137 MMHG | RESPIRATION RATE: 18 BRPM | OXYGEN SATURATION: 96 % | DIASTOLIC BLOOD PRESSURE: 81 MMHG | HEART RATE: 89 BPM | WEIGHT: 135 LBS | BODY MASS INDEX: 23.92 KG/M2

## 2021-06-30 DIAGNOSIS — R93.89 ABNORMAL FINDINGS ON DIAGNOSTIC IMAGING OF CARDIOVASCULAR SYSTEM: ICD-10-CM

## 2021-06-30 DIAGNOSIS — Z01.818 ENCOUNTER FOR PRE-TRANSPLANT EVALUATION FOR LUNG TRANSPLANT: ICD-10-CM

## 2021-06-30 DIAGNOSIS — J44.9 COPD (CHRONIC OBSTRUCTIVE PULMONARY DISEASE) (H): ICD-10-CM

## 2021-06-30 DIAGNOSIS — Z76.82 ORGAN TRANSPLANT CANDIDATE: ICD-10-CM

## 2021-06-30 DIAGNOSIS — Z11.59 ENCOUNTER FOR SCREENING FOR OTHER VIRAL DISEASES: ICD-10-CM

## 2021-06-30 DIAGNOSIS — J43.9 PULMONARY EMPHYSEMA, UNSPECIFIED EMPHYSEMA TYPE (H): ICD-10-CM

## 2021-06-30 DIAGNOSIS — Z98.890 S/P CORONARY ANGIOGRAM: Primary | ICD-10-CM

## 2021-06-30 LAB
ANION GAP SERPL CALCULATED.3IONS-SCNC: <1 MMOL/L (ref 3–14)
B-HCG SERPL-ACNC: 3 IU/L (ref 0–5)
BUN SERPL-MCNC: 30 MG/DL (ref 7–30)
CALCIUM SERPL-MCNC: 9.6 MG/DL (ref 8.5–10.1)
CHLORIDE SERPL-SCNC: 100 MMOL/L (ref 94–109)
CO2 SERPL-SCNC: 38 MMOL/L (ref 20–32)
CREAT SERPL-MCNC: 0.85 MG/DL (ref 0.52–1.04)
ERYTHROCYTE [DISTWIDTH] IN BLOOD BY AUTOMATED COUNT: 12.7 % (ref 10–15)
GFR SERPL CREATININE-BSD FRML MDRD: 75 ML/MIN/{1.73_M2}
GLUCOSE SERPL-MCNC: 117 MG/DL (ref 70–99)
HCT VFR BLD AUTO: 42.6 % (ref 35–47)
HGB BLD-MCNC: 11.8 G/DL (ref 11.7–15.7)
HGB BLD-MCNC: 12.9 G/DL (ref 11.7–15.7)
LABORATORY COMMENT REPORT: NORMAL
MCH RBC QN AUTO: 31.5 PG (ref 26.5–33)
MCHC RBC AUTO-ENTMCNC: 30.3 G/DL (ref 31.5–36.5)
MCV RBC AUTO: 104 FL (ref 78–100)
OXYHGB MFR BLD: 72 % (ref 92–100)
PLATELET # BLD AUTO: 284 10E9/L (ref 150–450)
POTASSIUM SERPL-SCNC: 4.4 MMOL/L (ref 3.4–5.3)
RBC # BLD AUTO: 4.09 10E12/L (ref 3.8–5.2)
SARS-COV-2 RNA RESP QL NAA+PROBE: NEGATIVE
SARS-COV-2 RNA RESP QL NAA+PROBE: NORMAL
SODIUM SERPL-SCNC: 138 MMOL/L (ref 133–144)
SPECIMEN SOURCE: NORMAL
SPECIMEN SOURCE: NORMAL
WBC # BLD AUTO: 5.8 10E9/L (ref 4–11)

## 2021-06-30 PROCEDURE — 78580 LUNG PERFUSION IMAGING: CPT | Mod: 26

## 2021-06-30 PROCEDURE — U0003 INFECTIOUS AGENT DETECTION BY NUCLEIC ACID (DNA OR RNA); SEVERE ACUTE RESPIRATORY SYNDROME CORONAVIRUS 2 (SARS-COV-2) (CORONAVIRUS DISEASE [COVID-19]), AMPLIFIED PROBE TECHNIQUE, MAKING USE OF HIGH THROUGHPUT TECHNOLOGIES AS DESCRIBED BY CMS-2020-01-R: HCPCS | Performed by: SPECIALIST

## 2021-06-30 PROCEDURE — U0005 INFEC AGEN DETEC AMPLI PROBE: HCPCS | Performed by: SPECIALIST

## 2021-06-30 PROCEDURE — 93005 ELECTROCARDIOGRAM TRACING: CPT

## 2021-06-30 PROCEDURE — 250N000009 HC RX 250: Performed by: INTERNAL MEDICINE

## 2021-06-30 PROCEDURE — G0463 HOSPITAL OUTPT CLINIC VISIT: HCPCS | Mod: 25

## 2021-06-30 PROCEDURE — 99205 OFFICE O/P NEW HI 60 MIN: CPT | Performed by: THORACIC SURGERY (CARDIOTHORACIC VASCULAR SURGERY)

## 2021-06-30 PROCEDURE — 999N000142 HC STATISTIC PROCEDURE PREP ONLY

## 2021-06-30 PROCEDURE — 343N000001 HC RX 343: Performed by: INTERNAL MEDICINE

## 2021-06-30 PROCEDURE — 82810 BLOOD GASES O2 SAT ONLY: CPT

## 2021-06-30 PROCEDURE — 999N000208 ECHOCARDIOGRAM COMPLETE

## 2021-06-30 PROCEDURE — 85027 COMPLETE CBC AUTOMATED: CPT | Performed by: INTERNAL MEDICINE

## 2021-06-30 PROCEDURE — 93456 R HRT CORONARY ARTERY ANGIO: CPT | Performed by: INTERNAL MEDICINE

## 2021-06-30 PROCEDURE — 999N000054 HC STATISTIC EKG NON-CHARGEABLE

## 2021-06-30 PROCEDURE — 99207 PR NO CHARGE NURSE ONLY: CPT | Performed by: PHYSICIAN ASSISTANT

## 2021-06-30 PROCEDURE — 99153 MOD SED SAME PHYS/QHP EA: CPT | Performed by: INTERNAL MEDICINE

## 2021-06-30 PROCEDURE — 250N000013 HC RX MED GY IP 250 OP 250 PS 637: Performed by: INTERNAL MEDICINE

## 2021-06-30 PROCEDURE — 36415 COLL VENOUS BLD VENIPUNCTURE: CPT | Performed by: INTERNAL MEDICINE

## 2021-06-30 PROCEDURE — 84702 CHORIONIC GONADOTROPIN TEST: CPT | Performed by: INTERNAL MEDICINE

## 2021-06-30 PROCEDURE — A9540 TC99M MAA: HCPCS | Performed by: INTERNAL MEDICINE

## 2021-06-30 PROCEDURE — 250N000011 HC RX IP 250 OP 636: Performed by: INTERNAL MEDICINE

## 2021-06-30 PROCEDURE — 272N000001 HC OR GENERAL SUPPLY STERILE: Performed by: INTERNAL MEDICINE

## 2021-06-30 PROCEDURE — 80048 BASIC METABOLIC PNL TOTAL CA: CPT | Performed by: INTERNAL MEDICINE

## 2021-06-30 PROCEDURE — 85018 HEMOGLOBIN: CPT

## 2021-06-30 PROCEDURE — 272N000002 HC OR SUPPLY OTHER OPNP: Performed by: INTERNAL MEDICINE

## 2021-06-30 PROCEDURE — C1894 INTRO/SHEATH, NON-LASER: HCPCS | Performed by: INTERNAL MEDICINE

## 2021-06-30 PROCEDURE — 93010 ELECTROCARDIOGRAM REPORT: CPT | Mod: 59 | Performed by: INTERNAL MEDICINE

## 2021-06-30 PROCEDURE — 99152 MOD SED SAME PHYS/QHP 5/>YRS: CPT | Performed by: INTERNAL MEDICINE

## 2021-06-30 PROCEDURE — 78580 LUNG PERFUSION IMAGING: CPT | Mod: MG

## 2021-06-30 PROCEDURE — 93306 TTE W/DOPPLER COMPLETE: CPT | Mod: 26 | Performed by: INTERNAL MEDICINE

## 2021-06-30 PROCEDURE — G1004 CDSM NDSC: HCPCS

## 2021-06-30 RX ORDER — ARGATROBAN 1 MG/ML
150 INJECTION, SOLUTION INTRAVENOUS
Status: DISCONTINUED | OUTPATIENT
Start: 2021-06-30 | End: 2021-06-30 | Stop reason: HOSPADM

## 2021-06-30 RX ORDER — NALOXONE HYDROCHLORIDE 0.4 MG/ML
0.4 INJECTION, SOLUTION INTRAMUSCULAR; INTRAVENOUS; SUBCUTANEOUS
Status: DISCONTINUED | OUTPATIENT
Start: 2021-06-30 | End: 2021-06-30 | Stop reason: HOSPADM

## 2021-06-30 RX ORDER — ARGATROBAN 1 MG/ML
350 INJECTION, SOLUTION INTRAVENOUS
Status: DISCONTINUED | OUTPATIENT
Start: 2021-06-30 | End: 2021-06-30 | Stop reason: HOSPADM

## 2021-06-30 RX ORDER — POTASSIUM CHLORIDE 750 MG/1
20 TABLET, EXTENDED RELEASE ORAL
Status: DISCONTINUED | OUTPATIENT
Start: 2021-06-30 | End: 2021-06-30 | Stop reason: HOSPADM

## 2021-06-30 RX ORDER — NALOXONE HYDROCHLORIDE 0.4 MG/ML
0.2 INJECTION, SOLUTION INTRAMUSCULAR; INTRAVENOUS; SUBCUTANEOUS
Status: DISCONTINUED | OUTPATIENT
Start: 2021-06-30 | End: 2021-06-30

## 2021-06-30 RX ORDER — DOPAMINE HYDROCHLORIDE 160 MG/100ML
2-20 INJECTION, SOLUTION INTRAVENOUS CONTINUOUS PRN
Status: DISCONTINUED | OUTPATIENT
Start: 2021-06-30 | End: 2021-06-30 | Stop reason: HOSPADM

## 2021-06-30 RX ORDER — LIDOCAINE 40 MG/G
CREAM TOPICAL
Status: DISCONTINUED | OUTPATIENT
Start: 2021-06-30 | End: 2021-06-30 | Stop reason: HOSPADM

## 2021-06-30 RX ORDER — HEPARIN SODIUM 1000 [USP'U]/ML
INJECTION, SOLUTION INTRAVENOUS; SUBCUTANEOUS
Status: DISCONTINUED | OUTPATIENT
Start: 2021-06-30 | End: 2021-06-30 | Stop reason: HOSPADM

## 2021-06-30 RX ORDER — NALOXONE HYDROCHLORIDE 0.4 MG/ML
0.2 INJECTION, SOLUTION INTRAMUSCULAR; INTRAVENOUS; SUBCUTANEOUS
Status: DISCONTINUED | OUTPATIENT
Start: 2021-06-30 | End: 2021-06-30 | Stop reason: HOSPADM

## 2021-06-30 RX ORDER — NALOXONE HYDROCHLORIDE 0.4 MG/ML
0.4 INJECTION, SOLUTION INTRAMUSCULAR; INTRAVENOUS; SUBCUTANEOUS
Status: DISCONTINUED | OUTPATIENT
Start: 2021-06-30 | End: 2021-06-30

## 2021-06-30 RX ORDER — TIROFIBAN HYDROCHLORIDE 50 UG/ML
0.15 INJECTION INTRAVENOUS CONTINUOUS PRN
Status: DISCONTINUED | OUTPATIENT
Start: 2021-06-30 | End: 2021-06-30 | Stop reason: HOSPADM

## 2021-06-30 RX ORDER — POTASSIUM CHLORIDE 750 MG/1
40 TABLET, EXTENDED RELEASE ORAL
Status: DISCONTINUED | OUTPATIENT
Start: 2021-06-30 | End: 2021-06-30 | Stop reason: HOSPADM

## 2021-06-30 RX ORDER — FLUMAZENIL 0.1 MG/ML
0.2 INJECTION, SOLUTION INTRAVENOUS
Status: DISCONTINUED | OUTPATIENT
Start: 2021-06-30 | End: 2021-06-30 | Stop reason: HOSPADM

## 2021-06-30 RX ORDER — NITROGLYCERIN 20 MG/100ML
10-200 INJECTION INTRAVENOUS CONTINUOUS PRN
Status: DISCONTINUED | OUTPATIENT
Start: 2021-06-30 | End: 2021-06-30 | Stop reason: HOSPADM

## 2021-06-30 RX ORDER — ATROPINE SULFATE 0.1 MG/ML
0.5 INJECTION INTRAVENOUS
Status: DISCONTINUED | OUTPATIENT
Start: 2021-06-30 | End: 2021-06-30 | Stop reason: HOSPADM

## 2021-06-30 RX ORDER — HEPARIN SODIUM 10000 [USP'U]/100ML
100-1000 INJECTION, SOLUTION INTRAVENOUS CONTINUOUS PRN
Status: DISCONTINUED | OUTPATIENT
Start: 2021-06-30 | End: 2021-06-30 | Stop reason: HOSPADM

## 2021-06-30 RX ORDER — NICARDIPINE HYDROCHLORIDE 2.5 MG/ML
INJECTION INTRAVENOUS
Status: DISCONTINUED | OUTPATIENT
Start: 2021-06-30 | End: 2021-06-30 | Stop reason: HOSPADM

## 2021-06-30 RX ORDER — SODIUM CHLORIDE 9 MG/ML
INJECTION, SOLUTION INTRAVENOUS CONTINUOUS
Status: DISCONTINUED | OUTPATIENT
Start: 2021-06-30 | End: 2021-06-30 | Stop reason: HOSPADM

## 2021-06-30 RX ORDER — ACYCLOVIR 200 MG/1
3 CAPSULE ORAL ONCE
Status: DISCONTINUED | OUTPATIENT
Start: 2021-06-30 | End: 2021-07-01 | Stop reason: HOSPADM

## 2021-06-30 RX ORDER — NITROGLYCERIN 5 MG/ML
VIAL (ML) INTRAVENOUS
Status: DISCONTINUED | OUTPATIENT
Start: 2021-06-30 | End: 2021-06-30 | Stop reason: HOSPADM

## 2021-06-30 RX ORDER — IOPAMIDOL 755 MG/ML
INJECTION, SOLUTION INTRAVASCULAR
Status: DISCONTINUED | OUTPATIENT
Start: 2021-06-30 | End: 2021-06-30 | Stop reason: HOSPADM

## 2021-06-30 RX ORDER — FENTANYL CITRATE 50 UG/ML
INJECTION, SOLUTION INTRAMUSCULAR; INTRAVENOUS
Status: DISCONTINUED | OUTPATIENT
Start: 2021-06-30 | End: 2021-06-30 | Stop reason: HOSPADM

## 2021-06-30 RX ORDER — FENTANYL CITRATE 50 UG/ML
25-50 INJECTION, SOLUTION INTRAMUSCULAR; INTRAVENOUS
Status: ACTIVE | OUTPATIENT
Start: 2021-06-30 | End: 2021-06-30

## 2021-06-30 RX ORDER — EPTIFIBATIDE 2 MG/ML
180 INJECTION, SOLUTION INTRAVENOUS EVERY 10 MIN PRN
Status: DISCONTINUED | OUTPATIENT
Start: 2021-06-30 | End: 2021-06-30 | Stop reason: HOSPADM

## 2021-06-30 RX ORDER — DOBUTAMINE HYDROCHLORIDE 200 MG/100ML
2-20 INJECTION INTRAVENOUS CONTINUOUS PRN
Status: DISCONTINUED | OUTPATIENT
Start: 2021-06-30 | End: 2021-06-30 | Stop reason: HOSPADM

## 2021-06-30 RX ORDER — EPTIFIBATIDE 2 MG/ML
2 INJECTION, SOLUTION INTRAVENOUS CONTINUOUS PRN
Status: DISCONTINUED | OUTPATIENT
Start: 2021-06-30 | End: 2021-06-30 | Stop reason: HOSPADM

## 2021-06-30 RX ADMIN — ASPIRIN 325 MG: 325 TABLET, COATED ORAL at 10:15

## 2021-06-30 RX ADMIN — KIT FOR THE PREPARATION OF TECHNETIUM TC 99M ALBUMIN AGGREGATED 6.8 MCI.: 2.5 INJECTION, POWDER, FOR SOLUTION INTRAVENOUS at 08:27

## 2021-06-30 ASSESSMENT — PAIN SCALES - GENERAL: PAINLEVEL: NO PAIN (0)

## 2021-06-30 ASSESSMENT — MIFFLIN-ST. JEOR
SCORE: 1156.36
SCORE: 1218.63

## 2021-06-30 NOTE — PROGRESS NOTES
Arrived from home for a CORS as a w/u for lung transplant.  96% on two liters O2 via nasal cannula ( home dose).  , OVSS.  Denies pain.  H&P current.  Consent obtained.  Ready for procedure.

## 2021-06-30 NOTE — DISCHARGE INSTRUCTIONS
Going Home after an Angioplasty or Stent Placement (Cardiac)  ______________________________________________      After you go home:    Have an adult stay with you for 24 hours.    Drink plenty of fluids.    You may eat your normal diet, unless your doctor tells you otherwise.    For 24 hours:    Relax and take it easy.    Do NOT smoke.    Do NOT make any important or legal decisions.    Do NOT drive or operate machines at home or at work.    Do NOT drink alcohol.    Remove the Band-Aid after 24 hours. If there is minor oozing, apply another Band-aid and remove it after 12 hours.    For 2 days, do NOT have sex or do any heavy exercise.    Do NOT take a bath, or use a hot tub or pool for at least 3 days. You may shower.      Care of wrist or arm site  It is normal to have soreness at the puncture site and mild tingling in your hand for up to 3 days.    For 2 days, do not use your hand or arm to support your weight (such as rising from a chair) or bend your wrist (such as lifting a garage door).    For 2 days, do not lift more than 5 pounds or exercise your arm (tennis, golf or bowling).    If you start bleeding from the site in your arm:    Sit down and press firmly on the site with your fingers for 10 minutes. Call your doctor as soon as you can.    If the bleeding stops, sit still and keep your wrist straight for 2 hours.    Medicines    If you have started taking Plavix or Effient, do not stop taking it until you talk to your heart doctor (cardiologist).    If you are on metformin (Glucophage), do not restart it until you have blood tests (within 2 to 3 days after discharge). When your doctor tells you it is safe, you may restart the metformin.    If you have stopped any other medicines, check with your nurse or provider about when to restart them.    Call 911 right away if you have bleeding that is heavy or does not stop.    Call your doctor if:    You have a large or growing hard lump around the site.    The  site is red, swollen, hot or tender.    Blood or fluid is draining from the site.    You have chills or a fever greater than 101 F (38 C).    Your  arm feels numb or cool.    You have hives, a rash or unusual itching.      [facility] [place]:  [phone] (7 days a week)

## 2021-06-30 NOTE — LETTER
6/30/2021         RE: Sofie Rodriguez  [address] [place] [address]        Dear Colleague,    Thank you for referring your patient, Sofie Rodriguez, to the [facility]. Please see a copy of my visit note below.    THORACIC SURGERY - NEW PATIENT OFFICE VISIT      Dear Dr. Berrios,    I saw Jennifer at Dr. Franks s request in consultation for surgical evaluation for lung transplant.   HPI  Ms. Sofie Rodriguez is a 61 year old female patient with end stage lung disease secondary to COPD and bronchiectasis.                                     Previsit Tests   PFT (6/14/2021): FEV1- 0.54 (21%), DLCO- 31%  6-minute walk test (6/14/2021): 262 m, NURIA index- 6 (57% estimated 4 year survival)  Quantitative Lung perfusion scan (6/30/21): RIGHT 47%, LEFT 52%.   Sniff test (6/14/21): Normal sniff test.   CXR (6/14/2021): Severe hyperinflation.       CT Chest (6/14/2021):   RML 8 mm solid nodule (series 4, image 186), enlarged from 5 mm in 1/12/20  RLL 9 mm solid nodule (series 4, image 232), new from 1/12/20  LLL 9 mm solid nodule (series 4, image 250), new from 1/12/20  MARLON 10 mm subsolid nodule, new from 1/12/20         ECHO (4/14/2021): Normal LV function, EF 55-60%. Mild concentric LVH. Normal R function.   Cornonary Angio/RHC: Pending    HRM 6/15/21: Pending.   Ambulatory pH study 6/15/21: Normal DeMeester score 2.6    PMH    Past Medical History:   Diagnosis Date     CHF (congestive heart failure) (H)      COPD (chronic obstructive pulmonary disease) (H)      Hepatitis 2017    Hep C, [facility]     HTN (hypertension)      Osteopenia         PSH  No chest surgeries.   Past Surgical History:   Procedure Laterality Date     COLONOSCOPY  2015     ENT SURGERY  1974    tonsillectomy     HAND SURGERY       LEEP TX, CERVICAL  04/07/2017    HECTOR III     LYMPH NODE BIOPSY Left 2005    Left axilla, benign- [place]       No Known Allergies    Current Outpatient Medications   Medication      "albuterol (PROAIR HFA/PROVENTIL HFA/VENTOLIN HFA) 108 (90 BASE) MCG/ACT Inhaler     aspirin 81 MG EC tablet     fluticasone-vilanterol (BREO ELLIPTA) 100-25 MCG/INH oral inhaler     ipratropium (ATROVENT) 0.02 % nebulizer solution     Multiple Vitamin (QUINTABS) TABS     umeclidinium (INCRUSE ELLIPTA) 62.5 MCG/INH oral inhaler     No current facility-administered medications for this visit.        ETOH Occasional.   TOB She quit on 11/2020. She was a heavy smoker.     Physical examination  BP (!) 140/74   Pulse 94   Temp 98.8  F (37.1  C) (Oral)   Ht 1.6 m (5' 3\")   Wt 67.4 kg (148 lb 11.2 oz)   SpO2 95%   BMI 26.34 kg/m    Alert and oriented, NAD.   Bilateral decreased breath sounds.    From a personal perspective, she worked as a . Currently on disability.     IMPRESSION   61 year old female patient with end-stage lung disease secondary to COPD/bronchiectasis.     PLAN  I spent a total of 60 minutes with Sofie DIANE Rodriguez reviewing her chart and imagine studies. I reviewed the plan as follows:  I agree with proceeding with lung transplant evaluation and I do not see a contraindication for surgery although, I have a few concerns:  Bilateral lung nodules: I would like to review her scan in lung nodule conference. Likely a PET scan will be recommended.   History of CHF: LHC/RHC pending.   History of Hep C: Seen by Dr Adan (). They recommended an MR elastography and Hep B serology.   Substance abuse: I am concerned about her long standing history of alcohol, meth and marijuana abuse (quit in 2019).   I think she would benefit from bilateral lung transplant. I am concerned about a SLT given her severe hyperinflation and bronchiectasis. In terms of approach, is surgeon preference but I would favor clamshell. There is no need for 2 staff. Once evaluation is completed we will present the case at lung transplant selection meeting to make the final decision about listing.  We had an extensive discussion " regarding the rationale for surgery, the alternatives, risks and benefits of the procedure. The risks discussed were, but not limited to, bleeding, infection, damage to surrounding structures, need for reoperation, need for tracheostomy or feeding tube, need for dialysis, use of ECMO before or after surgery, arrhythmias, DVT/PE or even death. We talked about the expected hospital stay (about 30 days), and prolonged recovery time (6-12 months). All questions were answered to their satisfaction and they agreed to proceed.     All questions were answered and Sofie Rodriguez and present family were in agreement with the plan.  I appreciate the opportunity to participate in the care of your patient and will keep you updated.  Sincerely,  Freddy Burrows MD                Again, thank you for allowing me to participate in the care of your patient.        Sincerely,        Renny Hester MD

## 2021-06-30 NOTE — PROGRESS NOTES
THORACIC SURGERY - NEW PATIENT OFFICE VISIT      Dear Dr. Berrios,    I saw Jennifer at Dr. Franks s request in consultation for surgical evaluation for lung transplant.   HPI  Ms. Sofie Rodriguez is a 61 year old female patient with end stage lung disease secondary to COPD and bronchiectasis.                                     Previsit Tests   PFT (6/14/2021): FEV1- 0.54 (21%), DLCO- 31%  6-minute walk test (6/14/2021): 262 m, NURIA index- 6 (57% estimated 4 year survival)  Quantitative Lung perfusion scan (6/30/21): RIGHT 47%, LEFT 52%.   Sniff test (6/14/21): Normal sniff test.   CXR (6/14/2021): Severe hyperinflation.       CT Chest (6/14/2021):   RML 8 mm solid nodule (series 4, image 186), enlarged from 5 mm in 1/12/20  RLL 9 mm solid nodule (series 4, image 232), new from 1/12/20  LLL 9 mm solid nodule (series 4, image 250), new from 1/12/20  MARLON 10 mm subsolid nodule, new from 1/12/20         ECHO (4/14/2021): Normal LV function, EF 55-60%. Mild concentric LVH. Normal R function.   Cornonary Angio/RHC: Pending    HRM 6/15/21: Pending.   Ambulatory pH study 6/15/21: Normal DeMeester score 2.6    PMH    Past Medical History:   Diagnosis Date     CHF (congestive heart failure) (H)      COPD (chronic obstructive pulmonary disease) (H)      Hepatitis 2017    Hep C, [facility]     HTN (hypertension)      Osteopenia         PSH  No chest surgeries.   Past Surgical History:   Procedure Laterality Date     COLONOSCOPY  2015     ENT SURGERY  1974    tonsillectomy     HAND SURGERY       LEEP TX, CERVICAL  04/07/2017    HECTOR III     LYMPH NODE BIOPSY Left 2005    Left axilla, benign- [place]       No Known Allergies    Current Outpatient Medications   Medication     albuterol (PROAIR HFA/PROVENTIL HFA/VENTOLIN HFA) 108 (90 BASE) MCG/ACT Inhaler     aspirin 81 MG EC tablet     fluticasone-vilanterol (BREO ELLIPTA) 100-25 MCG/INH oral inhaler     ipratropium (ATROVENT) 0.02 % nebulizer solution     Multiple Vitamin (QUINTABS)  "TABS     umeclidinium (INCRUSE ELLIPTA) 62.5 MCG/INH oral inhaler     No current facility-administered medications for this visit.        ETOH Occasional.   TOB She quit on 11/2020. She was a heavy smoker.     Physical examination  BP (!) 140/74   Pulse 94   Temp 98.8  F (37.1  C) (Oral)   Ht 1.6 m (5' 3\")   Wt 67.4 kg (148 lb 11.2 oz)   SpO2 95%   BMI 26.34 kg/m    Alert and oriented, NAD.   Bilateral decreased breath sounds.    From a personal perspective, she worked as a . Currently on disability.     IMPRESSION   61 year old female patient with end-stage lung disease secondary to COPD/bronchiectasis.     PLAN  I spent a total of 60 minutes with Sofie DIANE Rodriguez reviewing her chart and imagine studies. I reviewed the plan as follows:  I agree with proceeding with lung transplant evaluation and I do not see a contraindication for surgery although, I have a few concerns:  Bilateral lung nodules: I would like to review her scan in lung nodule conference. Likely a PET scan will be recommended.   History of CHF: LHC/RHC pending.   History of Hep C: Seen by Dr Adan (GI). They recommended an MR elastography and Hep B serology.   Substance abuse: I am concerned about her long standing history of alcohol, meth and marijuana abuse (quit in 2019).   I think she would benefit from bilateral lung transplant. I am concerned about a SLT given her severe hyperinflation and bronchiectasis. In terms of approach, is surgeon preference but I would favor clamshell. There is no need for 2 staff. Once evaluation is completed we will present the case at lung transplant selection meeting to make the final decision about listing.  We had an extensive discussion regarding the rationale for surgery, the alternatives, risks and benefits of the procedure. The risks discussed were, but not limited to, bleeding, infection, damage to surrounding structures, need for reoperation, need for tracheostomy or feeding tube, need for " dialysis, use of ECMO before or after surgery, arrhythmias, DVT/PE or even death. We talked about the expected hospital stay (about 30 days), and prolonged recovery time (6-12 months). All questions were answered to their satisfaction and they agreed to proceed.     All questions were answered and Sofie Rodriguez and present family were in agreement with the plan.  I appreciate the opportunity to participate in the care of your patient and will keep you updated.  Sincerely,  Freddy Burrows MD

## 2021-06-30 NOTE — PLAN OF CARE
D/I/A: Pt roomed on 3C in bay [address].  Arrived via litter and accompanied by sinus rhythm On/Off: On monitor.  VSSA.  Rhythm upon arrival sinus rhythm on monitor.  Denies pain or sob.  Reviewed activity restrictions and when to notify RN, ie-changes to breathing or increased chest pressure or chest pain.  CCL access:  Left radial.  P: Continue to monitor status.  Discharge to home once meeting criteria.

## 2021-06-30 NOTE — PRE-PROCEDURE
GENERAL PRE-PROCEDURE:   Procedure:  Coronary angiogram  Date/Time:  6/30/2021 10:50 AM    Verbal consent obtained?: Yes    Written consent obtained?: Yes    Risks and benefits: Risks, benefits and alternatives were discussed    Consent given by:  Patient  Patient states understanding of procedure being performed: Yes    Patient's understanding of procedure matches consent: Yes    Procedure consent matches procedure scheduled: Yes    Appropriately NPO:  Yes  ASA Class:  Class 3- Severe systemic disease, definite functional limitations  Mallampati  :  Grade 2- soft palate, base of uvula, tonsillar pillars, and portion of posterior pharyngeal wall visible  Lungs:  Lungs clear with good breath sounds bilaterally  Heart:  Normal heart sounds and rate  History & Physical reviewed:  History and physical reviewed and no updates needed  Statement of review:  I have reviewed the lab findings, diagnostic data, medications, and the plan for sedation        Greg Alva PA-C  [facility] Cardiology Team

## 2021-06-30 NOTE — H&P
"  [facility]  Sofie Rodriguez MRN: [id number]  1960  Date of Admission:2021  Primary care provider: Vinny Berrios         Chief Complaint:   Short of breath         History of Present Illness:   Sofie Rodriguez is a 61 year old female with a past medical history significant for severe COPD, tobacco use, history of meth and marijuana use referred by Dr. Franks for cors/RHC as ongoing lung transplantation evaluation.   Patient reports feeling \"okay\" today. She remains on 2L NC at rest, and increases to 4L with activity. She denies any new medications or new medical issues since her last clinic visit. She denies fever, chills, chest pain, nausea, abdominal discomfort, or orthopnea.            Review of Systems:    10 point review of systems negative except for stated above in HPI.          Past Medical History:   Medical History reviewed.   Past Medical History:   Diagnosis Date     CHF (congestive heart failure) (H)      COPD (chronic obstructive pulmonary disease) (H)      Hepatitis 2017    Hep C, [facility]     HTN (hypertension)      Osteopenia              Past Surgical History:   Surgical History reviewed.   Past Surgical History:   Procedure Laterality Date     COLONOSCOPY       ENT SURGERY  1974    tonsillectomy     HAND SURGERY       LEEP TX, CERVICAL  2017    HECTOR III     LYMPH NODE BIOPSY Left     Left axilla, benign- [place]             Social History:   Social History reviewed.  Social History     Tobacco Use     Smoking status: Former Smoker     Years: 30.00     Types: Cigarettes     Quit date: 2020     Years since quittin.6     Smokeless tobacco: Never Used   Substance Use Topics     Alcohol use: Yes     Frequency: 2-4 times a month             Family History:   Family History reviewed.   History reviewed. No pertinent family history.          Allergies:   No Known Allergies          Medications:   Medications Reviewed. " "  Current Facility-Administered Medications   Medication     aspirin (ASA) EC tablet 325 mg     lidocaine (LMX4) cream     lidocaine 1 % 0.1-1 mL     Patient is NOT allergic to contrast dye OR was given pre-procedure oral steroids for treatment     potassium chloride ER (KLOR-CON M) CR tablet 20 mEq     potassium chloride ER (KLOR-CON M) CR tablet 40 mEq     sodium chloride (PF) 0.9% PF flush 3 mL     sodium chloride (PF) 0.9% PF flush 3 mL     sodium chloride (PF) 0.9% PF flush 3 mL     sodium chloride 0.9% infusion     Facility-Administered Medications Ordered in Other Encounters   Medication     sodium chloride bacteriostatic 0.9 % flush 3 mL             Physical Exam:   Vitals were reviewed.  Blood pressure (!) 141/83, pulse 105, temperature 98.8  F (37.1  C), temperature source Oral, resp. rate 16, height 1.6 m (5' 2.99\"), weight 61.2 kg (135 lb), SpO2 96 %, not currently breastfeeding.    General: AAOx3, NAD  Skin: Not jaundiced, no rash, no ecchymoses  HEENT: MMM, PERRLA, EOM intact  CV: RRR, normal S1S2, no murmur, clicks, rubs  Resp: diminished bilateral bases, otherwise clear to auscultation bilaterally, no wheezes, rhonchi  Abd: Soft, non-tender, BS+, no masses appreciated  Extremities: warm and well perfused, palpable pulses, no edema  Neuro: No lateralizing symptoms or focal neurologic deficit        Labs:   Routine Labs:  No results found for: TROPI, TROPONIN, TROPR, TROPN  CMP  Recent Labs   Lab 06/30/21  0743      POTASSIUM 4.4   CHLORIDE 100   CO2 38*   ANIONGAP <1*   *   BUN 30   CR 0.85   GFRESTIMATED 75   GFRESTBLACK 87   ESTUARDO 9.6     CBC  Recent Labs   Lab 06/30/21  0743   WBC 5.8   RBC 4.09   HGB 12.9   HCT 42.6   *   MCH 31.5   MCHC 30.3*   RDW 12.7        INRNo lab results found in last 7 days.        Diagnostics:    EKG 6/30/2021   NSR      Assessment and Plan:     Sofie Rodriguez is a 61 year old female with a past medical history significant for severe COPD, tobacco " use, history of meth and marijuana use referred by Dr. Franks for cors/RHC as ongoing lung transplantation evaluation.    #Severe COPD   Undergoing lung transplant evaluation. Patient on daily 81mg aspirin and took this AM. Labs reviewed, unremarkable. EKG as above.   - proceed with RHC and coronary angiogram as planned  - Discharge later today per protocol        Greg Alva PA-C  [facility] Cardiology Team

## 2021-06-30 NOTE — NURSING NOTE
"Oncology Rooming Note    June 30, 2021 3:34 PM   Sofie Rodriguez is a 61 year old female who presents for:    Chief Complaint   Patient presents with     Oncology Clinic Visit     COPD; encounter for pre-transplant eval for lung transplant     Initial Vitals: BP (!) 140/74   Pulse 94   Temp 98.8  F (37.1  C) (Oral)   Ht 1.6 m (5' 3\")   Wt 67.4 kg (148 lb 11.2 oz)   SpO2 95%   BMI 26.34 kg/m   Estimated body mass index is 26.34 kg/m  as calculated from the following:    Height as of this encounter: 1.6 m (5' 3\").    Weight as of this encounter: 67.4 kg (148 lb 11.2 oz). Body surface area is 1.73 meters squared.  No Pain (0) Comment: Data Unavailable   No LMP recorded. Patient is postmenopausal.  Allergies reviewed: Yes  Medications reviewed: Yes    Medications: Medication refills not needed today.  Pharmacy name entered into EPIC: [facility] - [address]    Clinical concerns: none       Peggy Sears CMA            "

## 2021-06-30 NOTE — PROGRESS NOTES
Research Consent Note:     Study Title: Adenosine Contrast Correlations in Evaluating Revascularization ACCELERATION Study  IRB # [id number]    PI pager: Dr. Enzo Lopez  # [phone]   pager: Kelly Tran RN  #  [phone]                       Estimated dates of participation: One year     Consent form was reviewed with the patient.  The purpose, risks, and benefits of study participation were discussed.  The study was reviewed with expected duration of participation, procedures, along with any foreseeable risks or discomforts. It was discussed that study participation is voluntary and that refusal to participate will involve no penalty or decrease benefits to which the subject is otherwise entitled, and the subject may discontinue participation at any time without penalty or loss of benefits. Patient questions were answered. Patient was able to state what study participation involved.  Patient signed the consent and HIPAA forms prior to study participation and was given signed copies of both.

## 2021-06-30 NOTE — PROGRESS NOTES
D/I/A:  Patient is tolerating liquids and foods, ambulating, urinating, puncture sites are stable (no bleeding and no hematoma) and patient has a .  A+O x4 and making needs known.  CCL access sites C/D/I; no bleeding or hematoma; CMS intact.  VSSA.  SR on monitor.  IV access removed.  Education completed and outlined in AVS or handout: medications reviewed with patient.  Questions answered prior to discharge.  Belongings returned to patient at discharge.    P: Discharged to self care.  Patient to follow up with appts as per discharge instruction.

## 2021-07-01 LAB — INTERPRETATION ECG - MUSE: NORMAL

## 2021-07-07 DIAGNOSIS — J44.9 CHRONIC OBSTRUCTIVE PULMONARY DISEASE, UNSPECIFIED COPD TYPE (H): Primary | ICD-10-CM

## 2021-07-14 ENCOUNTER — HOSPITAL ENCOUNTER (OUTPATIENT)
Dept: PET IMAGING | Facility: CLINIC | Age: 61
Discharge: HOME | End: 2021-07-14
Attending: CLINICAL NURSE SPECIALIST | Admitting: CLINICAL NURSE SPECIALIST
Payer: MEDICARE

## 2021-07-14 DIAGNOSIS — R91.8 LUNG NODULES: ICD-10-CM

## 2021-07-14 PROCEDURE — 71260 CT THORAX DX C+: CPT | Mod: 26 | Performed by: STUDENT IN AN ORGANIZED HEALTH CARE EDUCATION/TRAINING PROGRAM

## 2021-07-14 PROCEDURE — 71260 CT THORAX DX C+: CPT

## 2021-07-14 PROCEDURE — A9552 F18 FDG: HCPCS

## 2021-07-14 PROCEDURE — 78816 PET IMAGE W/CT FULL BODY: CPT | Mod: 26 | Performed by: STUDENT IN AN ORGANIZED HEALTH CARE EDUCATION/TRAINING PROGRAM

## 2021-07-14 PROCEDURE — 74177 CT ABD & PELVIS W/CONTRAST: CPT | Mod: 26 | Performed by: STUDENT IN AN ORGANIZED HEALTH CARE EDUCATION/TRAINING PROGRAM

## 2021-07-14 PROCEDURE — 250N000011 HC RX IP 250 OP 636

## 2021-07-14 PROCEDURE — 343N000001 HC RX 343

## 2021-07-14 PROCEDURE — G1004 CDSM NDSC: HCPCS | Mod: GC | Performed by: STUDENT IN AN ORGANIZED HEALTH CARE EDUCATION/TRAINING PROGRAM

## 2021-07-14 PROCEDURE — 78816 PET IMAGE W/CT FULL BODY: CPT | Mod: PI,MG

## 2021-07-14 RX ORDER — IOPAMIDOL 755 MG/ML
91 INJECTION, SOLUTION INTRAVASCULAR ONCE
Status: COMPLETED | OUTPATIENT
Start: 2021-07-14 | End: 2021-07-14

## 2021-07-14 RX ADMIN — IOPAMIDOL 90 ML: 755 INJECTION, SOLUTION INTRAVENOUS at 08:49

## 2021-07-14 RX ADMIN — FLUDEOXYGLUCOSE F-18 10.23 MCI.: 500 INJECTION, SOLUTION INTRAVENOUS at 08:49

## 2021-07-16 DIAGNOSIS — R91.8 LUNG NODULES: Primary | ICD-10-CM

## 2021-07-21 ENCOUNTER — TELEPHONE (OUTPATIENT)
Dept: TRANSPLANT | Facility: CLINIC | Age: 61
End: 2021-07-21

## 2021-07-21 NOTE — TELEPHONE ENCOUNTER
Transplant Social Work Services Phone Call    Phone call to patient to confirm caregiver plan prior to her case being presented at lung transplant conference.  I had previously met with patient and 2 of her cousins.  Patient reports that her cousins are now feeling to overwhelmed to take this on.  She is currently pulling together another caregiver plan that involved her adult children and some friends.  I requested that when she has a solid plan in place, she return her caregiver agreement with names and phone numbers so that I could call and confirm their willingness and availability.  Discussed that if the lung transplant team determines she is a candidate for transplant, that she will need to have a solid caregiver plan in place before we can list her.  Also, explained that her caregivers will need to meet with transplant coordinator for teaching.  She verbalized understanding of this.      PLAN: await return of her caregiver contract at which time will verify plan.

## 2021-07-22 ENCOUNTER — TELEPHONE (OUTPATIENT)
Dept: TRANSPLANT | Facility: CLINIC | Age: 61
End: 2021-07-22

## 2021-07-22 NOTE — TELEPHONE ENCOUNTER
Hector Carrizales we were unable to review her candidacy in committee today. She is still working on a caregiver plan as her cousins are unable to provide needed support post transplant. She is quite stressed about this. She has three kids who have each committed to one week, a cousin to one week, a friend to one week and still has a substantial amount of time to cover for needed 24 hour support. Encouraged continued discussion with family and encouraged to reach out to myself or Marget with questions.    Individuals who will be main support post transplant should have education with coordinator once identified.

## 2021-07-27 ENCOUNTER — MEDICAL CORRESPONDENCE (OUTPATIENT)
Dept: HEALTH INFORMATION MANAGEMENT | Facility: CLINIC | Age: 61
End: 2021-07-27

## 2021-07-30 ENCOUNTER — TRANSFERRED RECORDS (OUTPATIENT)
Dept: HEALTH INFORMATION MANAGEMENT | Facility: CLINIC | Age: 61
End: 2021-07-30

## 2021-08-02 ENCOUNTER — TRANSFERRED RECORDS (OUTPATIENT)
Dept: HEALTH INFORMATION MANAGEMENT | Facility: CLINIC | Age: 61
End: 2021-08-02

## 2021-08-06 ENCOUNTER — TELEPHONE (OUTPATIENT)
Dept: TRANSPLANT | Facility: CLINIC | Age: 61
End: 2021-08-06

## 2021-08-09 NOTE — TELEPHONE ENCOUNTER
Transplant Social Work Services Progress Note       Received following email from patient:    Thank you for the information! I'm having a hard time finding aftercare! My kids are willing to each give me a week, and a cousin a week. So that covers 4 weeks. I'm getting very stressed out about this and I don't know what to do! Any suggestions?    Writer responded:     I m sorry, Sofie.  That is a tough situation.  Unfortunately, we  absolutely can not list you until you have a 12 week plan in place.  I  would encourage you to discuss with family and friends again.   Would it be possible for the 4 people you have to rotate? Could they  do a week at a time, rotating through the 12 weeks.  If there is a  financial concern related to them being away from work, I can  sometimes assist with some bill paying for them.  ( a car payment?   Rent?)  I have a limited amount of money to help with things like  that.  If it would help for me to have a group discussion with you and  your caregivers to explain why we can t do transplant without that  support and to problem solve options, we could do that by phone or  Zoom visit.      My only other suggestion is to reach out to a wider [place].  Friends?   A Taoist group?      Thanks for letting me know how things are going.  I wish there was  more I could do to help.  Again, I m happy to talk to your caregivers  if that would help.     Mana Valles      Plan:  Patient without adequate support plan for listing.  Await word from her on plan.

## 2021-08-10 LAB
MYCOBACTERIUM SPEC CULT: NORMAL
MYCOBACTERIUM SPEC CULT: NORMAL
SPECIMEN SOURCE: NORMAL

## 2021-08-19 ENCOUNTER — COMMITTEE REVIEW (OUTPATIENT)
Dept: TRANSPLANT | Facility: CLINIC | Age: 61
End: 2021-08-19

## 2021-08-19 ENCOUNTER — TELEPHONE (OUTPATIENT)
Dept: TRANSPLANT | Facility: CLINIC | Age: 61
End: 2021-08-19

## 2021-08-19 NOTE — COMMITTEE REVIEW
Thoracic Committee Review Note     Evaluation Date: 4/2/2021  Committee Review Date: 8/19/2021    Organ being evaluated for: Lung    Transplant Phase: Evaluation  Transplant Status: Active    Transplant Coordinator: Katlin Marquez  Transplant Surgeon:       Referring Physician: Josh Patel    Primary Diagnosis: COPD  Secondary Diagnosis:     Committee Review Members:  Nutrition Kera Goldman RD   Pharmacy Walter Doty, [facility]   Pulmonary & Critical Care Medicine Raiza Concepcion MD   Pulmonary Disease Yuliet Aviles RD, Harshad Gong MD, Stephan Bonds MD, Nguyen Johnson, JOSE, Claribel Franks MD    - Clinical Sophia Romero, [facility]   Transplant Nadya Pizarro, RN, Lana Chand, RN, Katlin Marquez, RN, Phyllis Loya, RN, Ann-Marie Armendariz, RN, Vani Go, RN   Transplant Surgery Renny Hester MD, Frantz Rivas MD       Transplant Eligibility: Age 16 years or greater, Eligible Native Organ Diagnosis, Meets following physiologic criteria: FEV1<30% predicted, Meets following physiologic criteria: Requires supplemental oxygen, Meets following physiologic criteria: Arterial pCO2>50 or venous pCO2>60, Hospitalized for pulmonary illness >twice/year , Objective evidence of progressive disease despite optimal medical mgnt and rehab, Functional deterioration despite optimal med management and rehabilitation, Meets following physiologic criteria: DLCO<40% predicted    Committee Review Decision: Approved    Relative Contraindications: Nicotine use >6 months <1 year    Absolute Contraindications:     Committee Chair Harshad Gong MD verbally attested to the committee's decision.    Committee Discussion Details: Approved  Bilateral lung transplant  Sternotomy or surgeon preference  No need for two surgeons  ECMO pre txp: NO  Intubation pre txp: Warrants discussion. Concern over borderline BMI and conditioning in the event of intubation.  Chest CT follow up 3  months for nodules of concern (nodify negative, PET indeterminate). THis should be completed around 10/14/2021 and sent to Malika and Golden for review of need for surveillance thereafter.  AFB, fungal cultures q 3 months with hx of nonTB Mycobacterium, daily sputum, nodules    NO caregiver plan established and listing is contingent upon firm caregiver plan in place. Mana will follow up with patient.    NOT approved for active listing at this time.          PMH: COPD, Bronchiectasis, Diastolic Heart dysfxn, steroid induced hyperglycemia, Hep C     CONCERNS:   Nodules  Caregiver support  Chem dep history  Dental exam pending        1. NEURO:     2. CARDIO:     Mild non obstructive CAD with mRCA 30% stenosis.    Moderately elevated pulmonary artery hypertension mPAP 35mmHg with (PVR 5 ISSA (per Hazel) and PCWP of 15mmHg) consistent with combined pre and post capillary pHTN .    Normal cardiac output level.     - ECHO BUBBLE:   Global and regional left ventricular function is normal with an EF of 55-60%.  Mild concentric wall thickening consistent with left ventricular hypertrophy  Negative bubble study     3. PULMONARY:   3 L at rest, 6 L w activity, PCO2 80     - CT CHEST:   Increased size and number of bilateral pulmonary nodules, which raises  concern for possible neoplasm versus confluent increased scarring with  destructive emphysema. Resolution of previous pleural effusions.  - Sniff: Normal  - UNOS Diagnosis: COPD     4. GI/: **     - Nutrition:   Not frail  - BMI: 21.9     5. PSYCHO/SOCIAL:   Depression mostly when younger, was proactive with meds and therapy. Denies current concerns with depression or anxiety.     - Social Work:   No caregiver plan at committee review. Cannot list until confirmed.     Chem Dep:   Extensive, frequent meth and daily marijuana until 2019. Had previously been to rehab however most recently quit cold turkey.  Quit smoking 11/2020  ETOH use 1-2 times/month     Social Support: Needs  confirmation               6. SURGERY:  - Planned procedure: BSL  - Concerns noted: BMI, caregivers, substance hx, nodules, hx of mycobacterium on single culture  Candidate for intubation before transplant? conditional         ECMO? NO     7. HEALTH MAINTENANCE: PAP, Mammogram UTD. Dental pending but scheduled.      8. LISTING PLAN: Not approved for listing at time of review

## 2021-08-19 NOTE — TELEPHONE ENCOUNTER
Discussed team decision of approval for bilateral lung transplant. Active listing only after a firm caregiver plan has been established and vetted with Mana Valdivia transplant S[facility]NC[place].    Sofie continues to have discussions with family to establish post txp careplan.    Discussed 3 month chest CT follow up (mid OCtober) and regular sputum cultures.

## 2021-08-30 ENCOUNTER — TELEPHONE (OUTPATIENT)
Dept: TRANSPLANT | Facility: CLINIC | Age: 61
End: 2021-08-30

## 2021-09-01 NOTE — TELEPHONE ENCOUNTER
Transplant Social Work Services Phone Call      Data: patient being evaluated for lung transplant has been struggling to pull together aftercare plan.  Received her caregiver plan late last week, she now states her 3 adult children have rallied and agree to provide her with 3 months of care.    Intervention: phone calls today to confirm caregivers willingness and availability.  Daughter, Julia Jackson reached first.  She states that she and her siblings have discussed it and between the 3 of them they are committed to the 3 months.  She believes her sister, Charity, has the most flexibility.  But, Julia and her brother have agreed to fill in the gaps.   I then spoke with Charity Dupree.  She states she is committed to taking 3 months off of work if needed.  She hopes her brother and sister will assist as much as possible so that is not necessary, but agrees she might be the most available.  I reminded her that we have limited transplant  grants available, that maybe able to assist with a bill payment for a caregiver with lost wages.  Encouraged her to share that info with siblings.  Also encouraged them to do a 'mock trial' of a caregiver calendar, so they could make sure they could make it work.  Message left for son,Gera.  He did not return my call.    Assessment: it appears that patient has adequate support to proceed with transplant.  It remains concerning as it took so many months for her children to come forward.  2 of the 3 tell me they are committed to do whatever is necessary.  Her son, has not returned my call.  Since her children were not involved in transplant evaluation at all, I would be my recommendation that the three have transplant teaching with coordinator before proceeding with listing for transplant.    Education provided by SW: caregiver requirements post transplant.  Reason for caregiver post transplant.  Risks of NOT having adequate support post transplant.  Plan: Adult children should  participate in transplant teaching.  Then OK to list. Discussed with transplant coordinator.

## 2021-09-13 ENCOUNTER — TELEPHONE (OUTPATIENT)
Dept: TRANSPLANT | Facility: CLINIC | Age: 61
End: 2021-09-13

## 2021-09-13 DIAGNOSIS — Z01.818 ENCOUNTER FOR PRE-TRANSPLANT EVALUATION FOR LUNG TRANSPLANT: ICD-10-CM

## 2021-09-13 DIAGNOSIS — Z01.818 ENCOUNTER FOR PRE-TRANSPLANT EVALUATION FOR LUNG TRANSPLANT: Primary | ICD-10-CM

## 2021-09-13 DIAGNOSIS — R91.8 PULMONARY NODULES: Primary | ICD-10-CM

## 2021-09-13 DIAGNOSIS — J44.9 CHRONIC OBSTRUCTIVE PULMONARY DISEASE, UNSPECIFIED COPD TYPE (H): ICD-10-CM

## 2021-09-13 NOTE — TELEPHONE ENCOUNTER
"Received insurance approval to list for lung transplant.  Confirmed that Sofie is ready to proceed with listing for lung transplant.  Verified blood type as O per transplant office protocol.   Listed in [facility] for bilateral lung transplant as was recommended by the lung transplant team.    Data used for listing:  Ht: 63\" Wt: 156 lbs Date: 6/14/21  Data source: PFTs  Oxygen requirements: 4 L at rest  Diabetic status: Not diabetic but hyperglycemic with prednisone  Functional status: Limited, karnofsky 70%  Prior malignancies: No  Smoking quit date: 11/2020!  On life support at time of listing: No  Prior cardiac surgery: No  Updated transplant tab phase status:Yes    Sofie will require a virtual crossmatch, and is aware that we will continue to monitor HLA antibodies with quarterly PRA levels.    Reminded Sofie that the call may come at any time and that *he/she will need to be ready when called. Sofie plans to drive .5 hours.  Sofie is aware that *he/she should have medications and oxygen ready to bring to the hospital.    Discussed contacting coordinator for change in medical status such as treatment with steroids or antibiotics, need for hospitalization, or new blood transfusion; and with any travel plans.  Sofie is aware that *he/she will need to be seen a minimum of every 6 months at [facility].  Next clinic appointment is scheduled on TBD  Notified surgeons and coordinators on call of new listing.   Wait list notification letter sent to Sofie and referring and primary care physicians. Yes/No Yes    Arranged transplant education 9/20/2021. Two daughters previously participated in education and have reviewed material at [email or web address].         "

## 2021-10-13 DIAGNOSIS — Z01.818 ENCOUNTER FOR PRE-TRANSPLANT EVALUATION FOR LUNG TRANSPLANT: Primary | ICD-10-CM

## 2021-10-13 DIAGNOSIS — Z51.81 ENCOUNTER FOR THERAPEUTIC DRUG LEVEL MONITORING: ICD-10-CM

## 2021-10-24 ENCOUNTER — ORGAN (OUTPATIENT)
Dept: TRANSPLANT | Facility: CLINIC | Age: 61
End: 2021-10-24

## 2021-11-02 DIAGNOSIS — J47.9 BRONCHIECTASIS (H): ICD-10-CM

## 2021-11-02 DIAGNOSIS — Z01.818 ENCOUNTER FOR PRE-TRANSPLANT EVALUATION FOR LUNG TRANSPLANT: Primary | ICD-10-CM

## 2021-11-11 ENCOUNTER — ALLIED HEALTH/NURSE VISIT (OUTPATIENT)
Dept: TRANSPLANT | Facility: CLINIC | Age: 61
End: 2021-11-11
Attending: INTERNAL MEDICINE
Payer: MEDICARE

## 2021-11-11 ENCOUNTER — DOCUMENTATION ONLY (OUTPATIENT)
Dept: TRANSPLANT | Facility: CLINIC | Age: 61
End: 2021-11-11

## 2021-11-11 ENCOUNTER — ANCILLARY PROCEDURE (OUTPATIENT)
Dept: CT IMAGING | Facility: CLINIC | Age: 61
End: 2021-11-11
Attending: INTERNAL MEDICINE
Payer: MEDICARE

## 2021-11-11 ENCOUNTER — LAB (OUTPATIENT)
Dept: LAB | Facility: CLINIC | Age: 61
End: 2021-11-11
Payer: MEDICARE

## 2021-11-11 VITALS
HEART RATE: 94 BPM | DIASTOLIC BLOOD PRESSURE: 75 MMHG | TEMPERATURE: 98.5 F | WEIGHT: 138 LBS | BODY MASS INDEX: 24.45 KG/M2 | HEIGHT: 63 IN | RESPIRATION RATE: 18 BRPM | SYSTOLIC BLOOD PRESSURE: 142 MMHG | OXYGEN SATURATION: 95 %

## 2021-11-11 DIAGNOSIS — Z01.818 ENCOUNTER FOR PRE-TRANSPLANT EVALUATION FOR LUNG TRANSPLANT: Primary | ICD-10-CM

## 2021-11-11 DIAGNOSIS — Z01.818 ENCOUNTER FOR PRE-TRANSPLANT EVALUATION FOR LUNG TRANSPLANT: ICD-10-CM

## 2021-11-11 DIAGNOSIS — Z51.81 ENCOUNTER FOR THERAPEUTIC DRUG LEVEL MONITORING: ICD-10-CM

## 2021-11-11 DIAGNOSIS — J47.9 BRONCHIECTASIS WITHOUT COMPLICATION (H): ICD-10-CM

## 2021-11-11 DIAGNOSIS — J44.9 CHRONIC OBSTRUCTIVE PULMONARY DISEASE, UNSPECIFIED COPD TYPE (H): ICD-10-CM

## 2021-11-11 DIAGNOSIS — R91.8 PULMONARY NODULES: ICD-10-CM

## 2021-11-11 PROBLEM — B18.2 HEPATITIS C, CHRONIC (H): Status: ACTIVE | Noted: 2021-11-11

## 2021-11-11 LAB
6 MIN WALK (FT): 1250 FT
6 MIN WALK (M): 381 M
ALBUMIN SERPL-MCNC: 3.4 G/DL (ref 3.4–5)
ALP SERPL-CCNC: 89 U/L (ref 40–150)
ALT SERPL W P-5'-P-CCNC: 21 U/L (ref 0–50)
ANION GAP SERPL CALCULATED.3IONS-SCNC: 1 MMOL/L (ref 3–14)
AST SERPL W P-5'-P-CCNC: 14 U/L (ref 0–45)
BASE EXCESS BLDV CALC-SCNC: 11.4 MMOL/L (ref -7.7–1.9)
BILIRUB SERPL-MCNC: 0.3 MG/DL (ref 0.2–1.3)
BUN SERPL-MCNC: 20 MG/DL (ref 7–30)
CALCIUM SERPL-MCNC: 9.4 MG/DL (ref 8.5–10.1)
CHLORIDE BLD-SCNC: 101 MMOL/L (ref 94–109)
CO2 SERPL-SCNC: 40 MMOL/L (ref 20–32)
CREAT SERPL-MCNC: 0.75 MG/DL (ref 0.52–1.04)
ERYTHROCYTE [DISTWIDTH] IN BLOOD BY AUTOMATED COUNT: 13.2 % (ref 10–15)
EXPTIME-PRE: 14.52 SEC
FEF2575-%PRED-PRE: 7 %
FEF2575-PRE: 0.16 L/SEC
FEF2575-PRED: 2.27 L/SEC
FEFMAX-%PRED-PRE: 32 %
FEFMAX-PRE: 2 L/SEC
FEFMAX-PRED: 6.19 L/SEC
FEV1-%PRED-PRE: 21 %
FEV1-PRE: 0.53 L
FEV1FEV6-PRE: 37 %
FEV1FEV6-PRED: 81 %
FEV1FVC-PRE: 25 %
FEV1FVC-PRED: 80 %
FIFMAX-PRE: 3.25 L/SEC
FVC-%PRED-PRE: 70 %
FVC-PRE: 2.17 L
FVC-PRED: 3.09 L
GFR SERPL CREATININE-BSD FRML MDRD: 88 ML/MIN/1.73M2
GLUCOSE BLD-MCNC: 100 MG/DL (ref 70–99)
HCO3 BLDV-SCNC: 41 MMOL/L (ref 21–28)
HCT VFR BLD AUTO: 40.5 % (ref 35–47)
HGB BLD-MCNC: 12.5 G/DL (ref 11.7–15.7)
Lab: NORMAL
MCH RBC QN AUTO: 31.2 PG (ref 26.5–33)
MCHC RBC AUTO-ENTMCNC: 30.9 G/DL (ref 31.5–36.5)
MCV RBC AUTO: 101 FL (ref 78–100)
O2/TOTAL GAS SETTING VFR VENT: 2 %
PCO2 BLDV: 85 MM HG (ref 40–50)
PERFORMING LABORATORY: NORMAL
PH BLDV: 7.3 [PH] (ref 7.32–7.43)
PLATELET # BLD AUTO: 246 10E3/UL (ref 150–450)
PO2 BLDV: 36 MM HG (ref 25–47)
POTASSIUM BLD-SCNC: 4.6 MMOL/L (ref 3.4–5.3)
PROT SERPL-MCNC: 7.7 G/DL (ref 6.8–8.8)
RBC # BLD AUTO: 4.01 10E6/UL (ref 3.8–5.2)
SODIUM SERPL-SCNC: 142 MMOL/L (ref 133–144)
SPECIMEN STATUS: NORMAL
TEST NAME: NORMAL
WBC # BLD AUTO: 6.5 10E3/UL (ref 4–11)

## 2021-11-11 PROCEDURE — 250N000021 HC RX MED A9270 GY (STAT IND- M) 250: Performed by: INTERNAL MEDICINE

## 2021-11-11 PROCEDURE — 82803 BLOOD GASES ANY COMBINATION: CPT | Performed by: PATHOLOGY

## 2021-11-11 PROCEDURE — 80323 ALKALOIDS NOS: CPT | Mod: 90 | Performed by: PATHOLOGY

## 2021-11-11 PROCEDURE — 80053 COMPREHEN METABOLIC PANEL: CPT | Performed by: PATHOLOGY

## 2021-11-11 PROCEDURE — 86832 HLA CLASS I HIGH DEFIN QUAL: CPT | Performed by: INTERNAL MEDICINE

## 2021-11-11 PROCEDURE — 90750 HZV VACC RECOMBINANT IM: CPT | Performed by: INTERNAL MEDICINE

## 2021-11-11 PROCEDURE — 80307 DRUG TEST PRSMV CHEM ANLYZR: CPT | Performed by: PATHOLOGY

## 2021-11-11 PROCEDURE — 99214 OFFICE O/P EST MOD 30 MIN: CPT | Performed by: INTERNAL MEDICINE

## 2021-11-11 PROCEDURE — 85027 COMPLETE CBC AUTOMATED: CPT | Performed by: PATHOLOGY

## 2021-11-11 PROCEDURE — 80321 ALCOHOLS BIOMARKERS 1OR 2: CPT | Mod: 90 | Performed by: PATHOLOGY

## 2021-11-11 PROCEDURE — G1004 CDSM NDSC: HCPCS | Mod: GC | Performed by: RADIOLOGY

## 2021-11-11 PROCEDURE — 90471 IMMUNIZATION ADMIN: CPT | Performed by: INTERNAL MEDICINE

## 2021-11-11 PROCEDURE — 94618 PULMONARY STRESS TESTING: CPT | Performed by: INTERNAL MEDICINE

## 2021-11-11 PROCEDURE — 86833 HLA CLASS II HIGH DEFIN QUAL: CPT | Performed by: INTERNAL MEDICINE

## 2021-11-11 PROCEDURE — 71250 CT THORAX DX C-: CPT | Mod: MG | Performed by: RADIOLOGY

## 2021-11-11 PROCEDURE — G0463 HOSPITAL OUTPT CLINIC VISIT: HCPCS | Mod: 25

## 2021-11-11 PROCEDURE — 36415 COLL VENOUS BLD VENIPUNCTURE: CPT | Performed by: PATHOLOGY

## 2021-11-11 PROCEDURE — 94375 RESPIRATORY FLOW VOLUME LOOP: CPT | Performed by: INTERNAL MEDICINE

## 2021-11-11 RX ADMIN — ZOSTER VACCINE RECOMBINANT, ADJUVANTED 0.5 ML: KIT at 16:44

## 2021-11-11 ASSESSMENT — PAIN SCALES - GENERAL: PAINLEVEL: NO PAIN (0)

## 2021-11-11 ASSESSMENT — ANXIETY QUESTIONNAIRES
3. WORRYING TOO MUCH ABOUT DIFFERENT THINGS: NOT AT ALL
1. FEELING NERVOUS, ANXIOUS, OR ON EDGE: NOT AT ALL
2. NOT BEING ABLE TO STOP OR CONTROL WORRYING: NOT AT ALL
6. BECOMING EASILY ANNOYED OR IRRITABLE: NOT AT ALL
7. FEELING AFRAID AS IF SOMETHING AWFUL MIGHT HAPPEN: NOT AT ALL
5. BEING SO RESTLESS THAT IT IS HARD TO SIT STILL: NOT AT ALL
GAD7 TOTAL SCORE: 0
IF YOU CHECKED OFF ANY PROBLEMS ON THIS QUESTIONNAIRE, HOW DIFFICULT HAVE THESE PROBLEMS MADE IT FOR YOU TO DO YOUR WORK, TAKE CARE OF THINGS AT HOME, OR GET ALONG WITH OTHER PEOPLE: NOT DIFFICULT AT ALL

## 2021-11-11 ASSESSMENT — MIFFLIN-ST. JEOR: SCORE: 1170.09

## 2021-11-11 ASSESSMENT — PATIENT HEALTH QUESTIONNAIRE - PHQ9: 5. POOR APPETITE OR OVEREATING: NOT AT ALL

## 2021-11-11 NOTE — LETTER
"    11/11/2021         RE: Sofie Rodriguez  [address] [place] [address]        [facility]  Transplant Clinic  11/11/21         Assessment and Plan:   Sofie Rodriguez is a 60 year old female with history of COPD and bronchiectasis  who is seen today in follow up for lung transplantation consideration. She completed lung transplant evaluation in 6/2021 and is currently listed.      #. Severe lung disease: Due to COPD/Bronchiectasis. Has been intubated x3 in the past. Sx seemed to stabilize after moving out of home with \"mold\".  Current regimen: Duonebs Q4h prn, Albuterol inhaler uses during pulm rehab, Breo incruse daily, Ellipta incruse daily.  Recurrent exac: None recently.  Rehab: Has completed 3 to 4 times.  Vaccinations: Uptodate, will give her the second dose of Shingrix.    Chronic hypoxic/hypercapneic resp failure: Using oxygen appropriately.    #. 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 Will repeat every three months.    #. Pulmonary nodules: Noted on chest CT in 6/2021. Reviewed with pulmonary nodule team  PET CT (7/2021):  8 mm right middle lobe irregular solid pulmonary nodule with SUV max of 1.8, which is in the indeterminate range for uptake. Given the growth from 1/12/2020, cannot exclude malignancy, such as a low-grade adenocarcinoma. At minimum, this should be followed with imaging, and tissue biopsy may be considered.  2. The left lower lobe and left upper lobe findings described on comparison CT 6/14/2021 have decreased in size and conspicuity and are favored to represent a resolving infectious or inflammatory process.  3. New subsegmental consolidation in the lingula with low levels of uptake, atelectasis versus new focus of infection/inflammation.   Nodify was negative.   11/11/2021: Repeat chest C with increased size of Rt. pleural and Rt. ML nodule. Will review with Dr. Hester. For now we will keep her " on the list.    #. HECTOR: Underwent a LEEP procedure, she reports she would not need another pap smear for 3-5 years but will follow up.    #. H/o Hep C: Diagnosed in 1980s, 2 mos of treatment, quant negative since 10/2017, last positive 2/20/17 ([phone]).  11/10/2021: Hep C antibody positive on 6/2021 and HCV pcr was neg. H/o remote ETOH abuse.   - Had MR elastography on 4/27/21.    - As per GI, no major concerns.    #. H/o Depression:   #. H/o drug abuse: Extensive, frequent meth and daily marijuana until 2019. Had previously been to rehab however most recently quit cold turkey. Quit smoking 11/2020. ETOH use 1-2 times/month  11/10/2021: Will need to get random PETH and nicotine testing.    #. Low bone density: Noted on DEXa from 6/2021. Vit D level was wnl.    #. H/o Steroid induced hyperglycemia: Last HgbA1c was 5.1 on 11/13/2020.    #. Lung transplant consideration:   - Dr. Franks discussed pro and cons about lung tx on 3/2021.    Issues to be considered:  - Follow up on pulmonary nodules.  - AFB cultures [id number].    Coordinator/MD: Katlin Pizarro/Claribel Franks    RTC:   Influenza and other vaccinations: Uptodate on Influenza and C19 vaccinations.  Annual dermatology visit:          Problem List:     1. Evaluation for lung transplantation  2. CAD  a. Chest CT notes multivessel calcifications  3. Hepatitis C: Diagnosed in 1980s, 2 mos of treatment, quant negative since 10/2017, last positive 2/20/17 ([phone])  4. Mental Health  a. Episodes of depression when younger  5. Steroid Induced Hyperglycemia  6. ?Diastolic Dysfunction  7. Hx of colonization with Mycobacterium peregrinum         History of Present Illness:   Sofie Rodriguez is a 60 year old female with history of COPD/Bronchiectasis who is seen today for evaluation for lung transplantation. She is referred by Dr. Josh Patel, and Pauline Madrigal NP from [facility] with a history of COPD/Bronchiectasis.     She reportedly smoked from a young age and has  experienced dyspnea intermittently since 10/2013. She went to a doctor and was found to be 82% on RA and was sent to the hospital where she was diagnosed with COPD and started on continuous oxygen. She was also diagnosed with CHF at the time and was diuresed aggressively. She is usually hospitalized about 1-2 times a year, but her last major hospitalization was in 1/2020, she required intubation during her hospitalization, she states she has been intubated 3 times in the past. She has not required steroids in the last year.     She continues to have shortness of breath that is overall unchanged from previous.  She continues to do exercises with some walking and resistance bands.  She is doing some laundry and cleaning.  She mostly microwaves or cooks on a griddle.    She can maybe only walk a block before she gets short of breath. She can walk a flight of stairs without stopping but has to stop and take a break at the top.   She is doing strengthening exercises with resistance bands. She can do her ADLs and IADLs without stopping but does get short of breath. She is able to leave the house, but her son drives her.   She lives with her son and feels better at home.   She hasn't lost any significant weight since the last visit.  She has no history of heartburn or acid reflux, denies waterbrash. No reported aspiration symptoms.   During exacerbations she'll feel more tired, and has a more productive cough green in nature, wheezes when sick. She does bring up sputum every day even when sick, although the volume is usually fairly low.     She is attending pulmonary rehab currently, twice weekly, and just started on March 8, 2021 this is her third or fourth time doing    Regarding her drug use history, she self quit meth and marijuana about 2 years ago but does not attend any meetings or rehab.     Interval history (11/10/2021):   Since the last visit she had COPD exacerbation which is associated with coughing up greenish  phlegm and right-sided soreness with sharp pains.  She was treated with prednisone and azithromycin starting on 10 29 2021 for symptoms that started 2 days prior to that she completed her azithromycin on 11 4 2021.    She has some cough and brings up white phlegm occ. No new CP. Occ Wheeze. Sleeps fairly well. Has Mild PND>        Current Meds:  Duonebs couple times a week  Albuterol inhaler uses during pulm rehab  Breo incruse daily  Ellipta incruse daily  [id number]  MV     Oxygen Use:   At rest 3L  Sleep: 3L bleed in with Trilogy, using 3-4 nights/week, will use trilogy during the day few times a week  With activity: 4-5L       Review of Systems:   Please see HPI, otherwise the complete 10 point ROS is negative.           Past Medical and Surgical History:     Past Medical History:   Diagnosis Date     CHF (congestive heart failure) (H)      COPD (chronic obstructive pulmonary disease) (H)      Hepatitis 2017    Hep C, [facility]     HTN (hypertension)      Osteopenia      Past Surgical History:   Procedure Laterality Date     COLONOSCOPY  2015     CV CORONARY ANGIOGRAM N/A 6/30/2021    Procedure: CV CORONARY ANGIOGRAM;  Surgeon: Alexander Cuellar MD;  Location:  HEART CARDIAC CATH LAB     CV RIGHT HEART CATH MEASUREMENTS RECORDED N/A 6/30/2021    Procedure: CV RIGHT HEART CATH;  Surgeon: Alexander Cuellar MD;  [facility]     ENT SURGERY  1974    tonsillectomy     HAND SURGERY       LEEP TX, CERVICAL  04/07/2017    HECTOR III     LYMPH NODE BIOPSY Left 2005    Left axilla, benign- [place]     Hepatitis C, diagnosed in the 1982. Her viral loads were back up in 2017 and then she was treated for it for 2 months.: Hep C quant negative since 10/2017, last positive 2/20/17 ([phone])        Family History:     Grandpa: MI  Mom: Skin ca, cervical ca  Dad: Prostate ca, CAD, d. At age 82  Brother: COPD (Smoker)  Brother: No medical care  Sister: healthy  Sister: Healthy         Social History:  "    Tobacco: Cigarettes smoked for 30-35 years 1-1.5ppd, quit in 11/11/20  ETOH: Very seldom, maybe 1-2 times/month bacardi limon or 2 glasses of wine. Reports heavy use as a teenager/young adult. Went through chem dep for alcohol use once in adolescence, and then around ages [age]. 2 DUIs, one in 1998 and then in 2008.   Illicits: Hx of Meth few times per week for about 15-20 years, last used 3/2019ish and marijuana frequently at times daily since age 17, quit around 3/2019ish. Stopped smoking marijuana because she couldn't breathe, the meth she quit b/c she just didn't feel healthy.  Exposures: Her old house probably had mold in it which may account for frequency of exacerbations.   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   Pets: None    Social: Single, 3 children, currently living with son, 2 daughters.          Medications:     Current Outpatient Medications   Medication     albuterol (PROAIR HFA/PROVENTIL HFA/VENTOLIN HFA) 108 (90 BASE) MCG/ACT Inhaler     aspirin 81 MG EC tablet     fluticasone-vilanterol (BREO ELLIPTA) 100-25 MCG/INH oral inhaler     ipratropium (ATROVENT) 0.02 % nebulizer solution     Multiple Vitamin (QUINTABS) TABS     umeclidinium (INCRUSE ELLIPTA) 62.5 MCG/INH oral inhaler     No current facility-administered medications for this visit.            Physical Exam:   There were no vitals taken for this visit.     Height 5'3\"  Weight 128 lbs  BMI 22.6    Exam limited by virtual nature of visit    Constitutional - looks well, in no apparent distress  Eyes - no redness or discharge  Respiratory -breathing appears comfortable.  No cough. Speaking in full sentences, not conversationally dyspneic. No accessory muscle use.Wearing oxygen  Skin - No appreciable discoloration or lesions (very limited exam)  Neurological - No apparent tremors. " Speech fluent and articlate  Psychiatric - no signs of delirium or anxiety  .         Data:   All laboratory and imaging data reviewed.      No results found for this or any previous visit (from the past 168 hour(s)).      PFT interpretation: March 25, 2021    Date Place TLC (%) FVC (%) FEV1 (%) FEV1/FVC DLCO (%) Note   3/11/20 [facility]   1.66 50 0.45 17 27% 2.78 11 No significant bronchodilatory response   9/24/15 [facility]     0.89 33 17%   No bronchodilatory response                                 6MWT Distance:       Serology:  CMV:  EBV:  HSV:     Micro:  4/30/2019 MYCOBACTERIUM PEREGRINUM      Labs:   Last A1c 5.1 11/13/2020  Alpha 1 AT: 1/27/21: Level 150           Imaging:       Chest CT w contrast: 1/12/20  Moderate cardiomegaly with left ventricular hypertrophy.  Moderate pericardial  effusion without evidence of compression    IMPRESSION:  Advanced emphysematous changes with likely superimposed interstitial edema.  Small to moderate right and small left pleural effusions with accompanying lower lobe atelectasis.  Moderate pericardial effusion.  Negative for pulmonary embolus.         Cardiac:     Most Recent TTE:  ECHO: 1/12/20  Summary:    * Left ventricular segmental wall motion is normal.    * The estimated ejection fraction is 55-60%.    * The right ventricle is enlarged.    * Reduced right ventricular systolic function.    * There is aortic valve sclerosis.    * There is trace mitral regurgitation.    * There is trace tricuspid regurgitation.    * Borderline pulmonary hypertension, estimated pulmonary arterial Systolic pressure is  39 mmHg.    * The right atrium is mildly enlarged.    * There is small to moderate cirumferential pericardial effusion visualized.    * No hemodynamic findings consistent with tamponade physiology.    * Prior study from 03/01/2019.    * Pericardial effusion is new.    Stress Test/Angiogram:    RHC:          GI:   EGD  [place]         Endo   DEXA:         Health  Maintenance     Mammogram: 10/2020    ASSESSMENT:   1.  ACR BI-RADS Category 2: Benign  2.  Dense Breast Tissue  3.  TLR = 8.2%, Low Risk Category     PAP: 07/2019 Negative, HPV negative  Abnormal Pap smear of cervix 02/20/2017   with High Risk HPV. Atypical Endocervical Cells, Positive HPV      Colonoscopy: no polyps, 2015 recc 10 year follow up     Dental: Two years - Dentures. Full dentures upper and lower. Still with native teeth.       Harshad Gong MD

## 2021-11-11 NOTE — LETTER
"    11/11/2021         RE: Sofie Rodriguez  [address] [place] [address]        Dear Colleague,    Thank you for referring your patient, Sofie Rodriguez, to the [facility]. Please see a copy of my visit note below.      [facility]  Transplant Clinic  11/11/21         Assessment and Plan:   Sofie Rodriguez is a 60 year old female with history of COPD and bronchiectasis  who is seen today in follow up for lung transplantation consideration. She completed lung transplant evaluation in 6/2021 and is currently listed.      #. Severe lung disease: Due to COPD/Bronchiectasis. Has been intubated x3 in the past. Sx seemed to stabilize after moving out of home with \"mold\".  Current regimen: Duonebs Q4h prn, Albuterol inhaler uses during pulm rehab, Breo incruse daily, Ellipta incruse daily.  Recurrent exac: None recently.  Rehab: Has completed 3 to 4 times.  Vaccinations: Uptodate, will give her the second dose of Shingrix.    Chronic hypoxic/hypercapneic resp failure: Using oxygen appropriately.    #. 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 Will repeat every three months.    #. Pulmonary nodules: Noted on chest CT in 6/2021. Reviewed with pulmonary nodule team  PET CT (7/2021):  8 mm right middle lobe irregular solid pulmonary nodule with SUV max of 1.8, which is in the indeterminate range for uptake. Given the growth from 1/12/2020, cannot exclude malignancy, such as a low-grade adenocarcinoma. At minimum, this should be followed with imaging, and tissue biopsy may be considered.  2. The left lower lobe and left upper lobe findings described on comparison CT 6/14/2021 have decreased in size and conspicuity and are favored to represent a resolving infectious or inflammatory process.  3. New subsegmental consolidation in the lingula with low levels of uptake, atelectasis versus new focus of " infection/inflammation.   Nodify was negative.   11/11/2021: Repeat chest C with increased size of Rt. pleural and Rt. ML nodule. Will review with Dr. Hester. For now we will keep her on the list.    #. HECTOR: Underwent a LEEP procedure, she reports she would not need another pap smear for 3-5 years but will follow up.    #. H/o Hep C: Diagnosed in 1980s, 2 mos of treatment, quant negative since 10/2017, last positive 2/20/17 (885,[phone]).  11/10/2021: Hep C antibody positive on 6/2021 and HCV pcr was neg. H/o remote ETOH abuse.   - Had MR elastography on 4/27/21.    - As per GI, no major concerns.    #. H/o Depression:   #. H/o drug abuse: Extensive, frequent meth and daily marijuana until 2019. Had previously been to rehab however most recently quit cold turkey. Quit smoking 11/2020. ETOH use 1-2 times/month  11/10/2021: Will need to get random PETH and nicotine testing.    #. Low bone density: Noted on DEXa from 6/2021. Vit D level was wnl.    #. H/o Steroid induced hyperglycemia: Last HgbA1c was 5.1 on 11/13/2020.    #. Lung transplant consideration:   - Dr. Franks discussed pro and cons about lung tx on 3/2021.    Issues to be considered:  - Follow up on pulmonary nodules.  - AFB cultures [id number].    Coordinator/MD: Katlin Pizarro/Claribel Franks    RTC:   Influenza and other vaccinations: Uptodate on Influenza and C19 vaccinations.  Annual dermatology visit:          Problem List:     1. Evaluation for lung transplantation  2. CAD  a. Chest CT notes multivessel calcifications  3. Hepatitis C: Diagnosed in 1980s, 2 mos of treatment, quant negative since 10/2017, last positive 2/20/17 ([phone])  4. Mental Health  a. Episodes of depression when younger  5. Steroid Induced Hyperglycemia  6. ?Diastolic Dysfunction  7. Hx of colonization with Mycobacterium peregrinum         History of Present Illness:   Sofie Rodriguez is a 60 year old female with history of COPD/Bronchiectasis who is seen today for evaluation for lung  transplantation. She is referred by Dr. Josh Patel, and Pauline [facility]al [facility] with a history of COPD/Bronchiectasis.     She reportedly smoked from a young age and has experienced dyspnea intermittently since 10/2013. She went to a doctor and was found to be 82% on RA and was sent to the hospital where she was diagnosed with COPD and started on continuous oxygen. She was also diagnosed with CHF at the time and was diuresed aggressively. She is usually hospitalized about 1-2 times a year, but her last major hospitalization was in 1/2020, she required intubation during her hospitalization, she states she has been intubated 3 times in the past. She has not required steroids in the last year.     She continues to have shortness of breath that is overall unchanged from previous.  She continues to do exercises with some walking and resistance bands.  She is doing some laundry and cleaning.  She mostly microwaves or cooks on a griddle.    She can maybe only walk a block before she gets short of breath. She can walk a flight of stairs without stopping but has to stop and take a break at the top.   She is doing strengthening exercises with resistance bands. She can do her ADLs and IADLs without stopping but does get short of breath. She is able to leave the house, but her son drives her.   She lives with her son and feels better at home.   She hasn't lost any significant weight since the last visit.  She has no history of heartburn or acid reflux, denies waterbrash. No reported aspiration symptoms.   During exacerbations she'll feel more tired, and has a more productive cough green in nature, wheezes when sick. She does bring up sputum every day even when sick, although the volume is usually fairly low.     She is attending pulmonary rehab currently, twice weekly, and just started on March 8, 2021 this is her third or fourth time doing    Regarding her drug use history, she self quit meth and marijuana  about 2 years ago but does not attend any meetings or rehab.     Interval history (11/10/2021):   Since the last visit she had COPD exacerbation which is associated with coughing up greenish phlegm and right-sided soreness with sharp pains.  She was treated with prednisone and azithromycin starting on 10 29 2021 for symptoms that started 2 days prior to that she completed her azithromycin on 11 4 2021.    She has some cough and brings up white phlegm occ. No new CP. Occ Wheeze. Sleeps fairly well. Has Mild PND>        Current Meds:  Duonebs couple times a week  Albuterol inhaler uses during pulm rehab  Breo incruse daily  Ellipta incruse daily  [id number]  MV     Oxygen Use:   At rest 3L  Sleep: 3L bleed in with Trilogy, using 3-4 nights/week, will use trilogy during the day few times a week  With activity: 4-5L       Review of Systems:   Please see HPI, otherwise the complete 10 point ROS is negative.           Past Medical and Surgical History:     Past Medical History:   Diagnosis Date     CHF (congestive heart failure) (H)      COPD (chronic obstructive pulmonary disease) (H)      Hepatitis 2017    Hep C, [facility]     HTN (hypertension)      Osteopenia      Past Surgical History:   Procedure Laterality Date     COLONOSCOPY  2015     CV CORONARY ANGIOGRAM N/A 6/30/2021    Procedure: CV CORONARY ANGIOGRAM;  Surgeon: Alexander Cuellar MD;  Location:  HEART CARDIAC CATH LAB     CV RIGHT HEART CATH MEASUREMENTS RECORDED N/A 6/30/2021    Procedure: CV RIGHT HEART CATH;  Surgeon: Alexander Cuellar MD;  Location:  HEART CARDIAC CATH LAB     ENT SURGERY  1974    tonsillectomy     HAND SURGERY       LEEP TX, CERVICAL  04/07/2017    HECTOR III     LYMPH NODE BIOPSY Left 2005    Left axilla, benign- [place]     Hepatitis C, diagnosed in the 1982. Her viral loads were back up in 2017 and then she was treated for it for 2 months.: Hep C quant negative since 10/2017, last positive 2/20/17 ([phone])        Family History:  "    Grandpa: MI  Mom: Skin ca, cervical ca  Dad: Prostate ca, CAD, d. At age 82  Brother: COPD (Smoker)  Brother: No medical care  Sister: healthy  Sister: Healthy         Social History:     Tobacco: Cigarettes smoked for 30-35 years 1-1.5ppd, quit in 11/11/20  ETOH: Very seldom, maybe 1-2 times/month bacardi limon or 2 glasses of wine. Reports heavy use as a teenager/young adult. Went through chem dep for alcohol use once in adolescence, and then around ages [age]. 2 DUIs, one in 1998 and then in 2008.   Illicits: Hx of Meth few times per week for about 15-20 years, last used 3/2019ish and marijuana frequently at times daily since age 17, quit around 3/2019ish. Stopped smoking marijuana because she couldn't breathe, the meth she quit b/c she just didn't feel healthy.  Exposures: Her old house probably had mold in it which may account for frequency of exacerbations.   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 Worked at [employer] for 10.5 years and then as a  at a [employer] office. 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   Pets: None    Social: Single, 3 children, currently living with son, 2 daughters.          Medications:     Current Outpatient Medications   Medication     albuterol (PROAIR HFA/PROVENTIL HFA/VENTOLIN HFA) 108 (90 BASE) MCG/ACT Inhaler     aspirin 81 MG EC tablet     fluticasone-vilanterol (BREO ELLIPTA) 100-25 MCG/INH oral inhaler     ipratropium (ATROVENT) 0.02 % nebulizer solution     Multiple Vitamin (QUINTABS) TABS     umeclidinium (INCRUSE ELLIPTA) 62.5 MCG/INH oral inhaler     No current facility-administered medications for this visit.            Physical Exam:   There were no vitals taken for this visit.     Height 5'3\"  Weight 128 lbs  BMI 22.6    Exam limited by virtual nature of visit    Constitutional - looks well, in no apparent distress  Eyes - no redness or discharge  Respiratory -breathing appears comfortable.  No cough. Speaking in full " sentences, not conversationally dyspneic. No accessory muscle use.Wearing oxygen  Skin - No appreciable discoloration or lesions (very limited exam)  Neurological - No apparent tremors. Speech fluent and articlate  Psychiatric - no signs of delirium or anxiety  .         Data:   All laboratory and imaging data reviewed.      No results found for this or any previous visit (from the past 168 hour(s)).      PFT interpretation: March 25, 2021    Date Place TLC (%) FVC (%) FEV1 (%) FEV1/FVC DLCO (%) Note   3/11/20 [facility]   1.66 50 0.45 17 27% 2.78 11 No significant bronchodilatory response   9/24/15 [facility]     0.89 33 17%   No bronchodilatory response                                 6MWT Distance:       Serology:  CMV:  EBV:  HSV:     Micro:  4/30/2019 MYCOBACTERIUM PEREGRINUM      Labs:   Last A1c 5.1 11/13/2020  Alpha 1 AT: 1/27/21: Level 150           Imaging:       Chest CT w contrast: 1/12/20  Moderate cardiomegaly with left ventricular hypertrophy.  Moderate pericardial  effusion without evidence of compression    IMPRESSION:  Advanced emphysematous changes with likely superimposed interstitial edema.  Small to moderate right and small left pleural effusions with accompanying lower lobe atelectasis.  Moderate pericardial effusion.  Negative for pulmonary embolus.         Cardiac:     Most Recent TTE:  ECHO: 1/12/20  Summary:    * Left ventricular segmental wall motion is normal.    * The estimated ejection fraction is 55-60%.    * The right ventricle is enlarged.    * Reduced right ventricular systolic function.    * There is aortic valve sclerosis.    * There is trace mitral regurgitation.    * There is trace tricuspid regurgitation.    * Borderline pulmonary hypertension, estimated pulmonary arterial Systolic pressure is  39 mmHg.    * The right atrium is mildly enlarged.    * There is small to moderate cirumferential pericardial effusion visualized.    * No hemodynamic findings consistent with tamponade  physiology.    * Prior study from 03/01/2019.    * Pericardial effusion is new.    Stress Test/Angiogram:    RHC:          GI:   EGD  [place]         Endo   DEXA:         Health Maintenance     Mammogram: 10/2020    ASSESSMENT:   1.  ACR BI-RADS Category 2: Benign  2.  Dense Breast Tissue  3.  TLR = 8.2%, Low Risk Category     PAP: 07/2019 Negative, HPV negative  Abnormal Pap smear of cervix 02/20/2017   with High Risk HPV. Atypical Endocervical Cells, Positive HPV      Colonoscopy: no polyps, 2015 recc 10 year follow up     Dental: Two years - Dentures. Full dentures upper and lower. Still with native teeth.       Again, thank you for allowing me to participate in the care of your patient.        Sincerely,        Harshad Gong MD

## 2021-11-11 NOTE — PROGRESS NOTES
Patient Name: Sofie Rodriguez  : 1960  Age: 61 year old  MRN: [id number]  Date of Initial Social Work Evaluation: 2021    Patient on transplant wait list.  Saw today to update psychosocial assessment.  Patient was alone for today's visit.     Presenting Information   Living Situation: currently living with her son, Gera, in split level single family home in [place], [place]  If not local, plans for short term stay:  [facility]  Functional Status: can do light housework.  Needs 5l of O2 with activity  Cultural/Language/Spiritual Considerations: n/a    Support System  Primary Support Person sonGera  Other support:  Daughters, Charity and Julia  Plan for support in immediate post-transplant period:  Per patient today, Julia will be primary caregiver.  Staying with patient for 2 weeks at a time.  Gera and Charity will alternate giving her breaks for a week.      Health Care Directive  Decision Maker: patient  Alternate Decision Maker: adult children  Health Care Directive: Provided education, patient believes she complete one at an outside hospital, but doesn't have copy. Today, she states she would prefer if her children made decisions togeter.      Mental Health/Coping:   History of Mental Health: denies depression or anxiety.  No hx of mental health.  PHQ-9 = 3,  NIRANJAN-7=0  History of Chemical Health: Yes  Smoked marijuana and meth amphetamine regularly for 30 years.  Quit cold turkey around 2 + years ago.  CD treatment x3 in past without success.  Longest period of sobriety until now, was less than a year. Credits success this time by cutting off ties with using friends. Excellent support from family. Verbalizes commitment to sobriety.   1 drink/ 2 times per month until April.  I instructed her to quit on . Has not had drink since that time.   Current status: as above  Coping: distraction, support from family   Services Needed/Recommended: n/a    Financial   Income: SSD  Impact of transplant on  income: none  Insurance and medication coverage: Medicare and Medical Assistance  Financial concerns: worried about cost of lodging.  I found out today, that patient's medicaid coverage is only a QMB (supplement to medicaid.)  This will not cover lodging.  Will discuss further with patient  Resources needed: can use [facility] brenna to cover one month of lodging.  Will encourage patient to explore fundraising and family assist for other 2 months.      Education provided by SW: Social Work role in transplant program, availability of support groups, parking information and lodging costs and coverage.     Assessment and recommendations and plan:  Patient remains an adequate candidate for transplant.  Will further discuss cost and coverage of lodging with her

## 2021-11-11 NOTE — PROGRESS NOTES
"  [facility]  Transplant Clinic  11/11/21         Assessment and Plan:   Sofie Rodriguez is a 60 year old female with history of COPD and bronchiectasis  who is seen today in follow up for lung transplantation consideration. She completed lung transplant evaluation in 6/2021 and is currently listed.      #. Severe lung disease: Due to COPD/Bronchiectasis. Has been intubated x3 in the past. Sx seemed to stabilize after moving out of home with \"mold\".  Current regimen: Duonebs Q4h prn, Albuterol inhaler uses during pulm rehab, Breo incruse daily, Ellipta incruse daily.  Recurrent exac: None recently.  Rehab: Has completed 3 to 4 times.  Vaccinations: Uptodate, will give her the second dose of Shingrix.    Chronic hypoxic/hypercapneic resp failure: Using oxygen appropriately.    #. 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 Will repeat every three months.    #. Pulmonary nodules: Noted on chest CT in 6/2021. Reviewed with pulmonary nodule team  PET CT (7/2021):  8 mm right middle lobe irregular solid pulmonary nodule with SUV max of 1.8, which is in the indeterminate range for uptake. Given the growth from 1/12/2020, cannot exclude malignancy, such as a low-grade adenocarcinoma. At minimum, this should be followed with imaging, and tissue biopsy may be considered.  2. The left lower lobe and left upper lobe findings described on comparison CT 6/14/2021 have decreased in size and conspicuity and are favored to represent a resolving infectious or inflammatory process.  3. New subsegmental consolidation in the lingula with low levels of uptake, atelectasis versus new focus of infection/inflammation.   Nodify was negative.   11/11/2021: Repeat chest C with increased size of Rt. pleural and Rt. ML nodule. Will review with Dr. Hester. For now we will keep her on the list.    #. HECTOR: Underwent a LEEP procedure, she reports she would not need " another pap smear for 3-5 years but will follow up.    #. H/o Hep C: Diagnosed in 1980s, 2 mos of treatment, quant negative since 10/2017, last positive 2/20/17 ([phone]).  11/10/2021: Hep C antibody positive on 6/2021 and HCV pcr was neg. H/o remote ETOH abuse.   - Had MR elastography on 4/27/21.    - As per GI, no major concerns.    #. H/o Depression:   #. H/o drug abuse: Extensive, frequent meth and daily marijuana until 2019. Had previously been to rehab however most recently quit cold turkey. Quit smoking 11/2020. ETOH use 1-2 times/month  11/10/2021: Will need to get random PETH and nicotine testing.    #. Low bone density: Noted on DEXa from 6/2021. Vit D level was wnl.    #. H/o Steroid induced hyperglycemia: Last HgbA1c was 5.1 on 11/13/2020.    #. Lung transplant consideration:   - Dr. Franks discussed pro and cons about lung tx on 3/2021.    Issues to be considered:  - Follow up on pulmonary nodules.  - AFB cultures Z1clorrh.    Coordinator/MD: Katlin Pizarro/Claribel Franks    RTC:   Influenza and other vaccinations: Uptodate on Influenza and C19 vaccinations.  Annual dermatology visit:          Problem List:     1. Evaluation for lung transplantation  2. CAD  a. Chest CT notes multivessel calcifications  3. Hepatitis C: Diagnosed in 1980s, 2 mos of treatment, quant negative since 10/2017, last positive 2/20/17 ([phone])  4. Mental Health  a. Episodes of depression when younger  5. Steroid Induced Hyperglycemia  6. ?Diastolic Dysfunction  7. Hx of colonization with Mycobacterium peregrinum         History of Present Illness:   Sofie Rodriguez is a 60 year old female with history of COPD/Bronchiectasis who is seen today for evaluation for lung transplantation. She is referred by Dr. Josh Patel, and Pauline Madrigal NP from [facility] with a history of COPD/Bronchiectasis.     She reportedly smoked from a young age and has experienced dyspnea intermittently since 10/2013. She went to a doctor and was found to be  82% on RA and was sent to the hospital where she was diagnosed with COPD and started on continuous oxygen. She was also diagnosed with CHF at the time and was diuresed aggressively. She is usually hospitalized about 1-2 times a year, but her last major hospitalization was in 1/2020, she required intubation during her hospitalization, she states she has been intubated 3 times in the past. She has not required steroids in the last year.     She continues to have shortness of breath that is overall unchanged from previous.  She continues to do exercises with some walking and resistance bands.  She is doing some laundry and cleaning.  She mostly microwaves or cooks on a griddle.    She can maybe only walk a block before she gets short of breath. She can walk a flight of stairs without stopping but has to stop and take a break at the top.   She is doing strengthening exercises with resistance bands. She can do her ADLs and IADLs without stopping but does get short of breath. She is able to leave the house, but her son drives her.   She lives with her son and feels better at home.   She hasn't lost any significant weight since the last visit.  She has no history of heartburn or acid reflux, denies waterbrash. No reported aspiration symptoms.   During exacerbations she'll feel more tired, and has a more productive cough green in nature, wheezes when sick. She does bring up sputum every day even when sick, although the volume is usually fairly low.     She is attending pulmonary rehab currently, twice weekly, and just started on March 8, 2021 this is her third or fourth time doing    Regarding her drug use history, she self quit meth and marijuana about 2 years ago but does not attend any meetings or rehab.     Interval history (11/10/2021):   Since the last visit she had COPD exacerbation which is associated with coughing up greenish phlegm and right-sided soreness with sharp pains.  She was treated with prednisone and  azithromycin starting on 10 29 2021 for symptoms that started 2 days prior to that she completed her azithromycin on 11 4 2021.    She has some cough and brings up white phlegm occ. No new CP. Occ Wheeze. Sleeps fairly well. Has Mild PND>        Current Meds:  Duonebs couple times a week  Albuterol inhaler uses during pulm rehab  Breo incruse daily  Ellipta incruse daily  [id number]  MV     Oxygen Use:   At rest 3L  Sleep: 3L bleed in with Trilogy, using 3-4 nights/week, will use trilogy during the day few times a week  With activity: 4-5L       Review of Systems:   Please see HPI, otherwise the complete 10 point ROS is negative.           Past Medical and Surgical History:     Past Medical History:   Diagnosis Date     CHF (congestive heart failure) (H)      COPD (chronic obstructive pulmonary disease) (H)      Hepatitis 2017    Hep C, [facility]     HTN (hypertension)      Osteopenia      Past Surgical History:   Procedure Laterality Date     COLONOSCOPY  2015     CV CORONARY ANGIOGRAM N/A 6/30/2021    Procedure: CV CORONARY ANGIOGRAM;  Surgeon: Alexander Cuellar MD;  Location:  HEART CARDIAC CATH LAB     CV RIGHT HEART CATH MEASUREMENTS RECORDED N/A 6/30/2021    Procedure: CV RIGHT HEART CATH;  Surgeon: Alexander Cuellar MD;  Location:  HEART CARDIAC CATH LAB     ENT SURGERY  1974    tonsillectomy     HAND SURGERY       LEEP TX, CERVICAL  04/07/2017    HECTOR III     LYMPH NODE BIOPSY Left 2005    Left axilla, benign- [place]     Hepatitis C, diagnosed in the 1982. Her viral loads were back up in 2017 and then she was treated for it for 2 months.: Hep C quant negative since 10/2017, last positive 2/20/17 ([phone])        Family History:     Grandpa: MI  Mom: Skin ca, cervical ca  Dad: Prostate ca, CAD, d. At age 82  Brother: COPD (Smoker)  Brother: No medical care  Sister: healthy  Sister: Healthy         Social History:     Tobacco: Cigarettes smoked for 30-35 years 1-1.5ppd, quit in 11/11/20  ETOH: Very seldom,  "maybe 1-2 times/month bacardi limon or 2 glasses of wine. Reports heavy use as a teenager/young adult. Went through chem dep for alcohol use once in adolescence, and then around ages [age]. 2 DUIs, one in 1998 and then in 2008.   Illicits: Hx of Meth few times per week for about 15-20 years, last used 3/2019ish and marijuana frequently at times daily since age 17, quit around 3/2019ish. Stopped smoking marijuana because she couldn't breathe, the meth she quit b/c she just didn't feel healthy.  Exposures: Her old house probably had mold in it which may account for frequency of exacerbations.   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   Pets: None    Social: Single, 3 children, currently living with son, 2 daughters.          Medications:     Current Outpatient Medications   Medication     albuterol (PROAIR HFA/PROVENTIL HFA/VENTOLIN HFA) 108 (90 BASE) MCG/ACT Inhaler     aspirin 81 MG EC tablet     fluticasone-vilanterol (BREO ELLIPTA) 100-25 MCG/INH oral inhaler     ipratropium (ATROVENT) 0.02 % nebulizer solution     Multiple Vitamin (QUINTABS) TABS     umeclidinium (INCRUSE ELLIPTA) 62.5 MCG/INH oral inhaler     No current facility-administered medications for this visit.            Physical Exam:   There were no vitals taken for this visit.     Height 5'3\"  Weight 128 lbs  BMI 22.6    Exam limited by virtual nature of visit    Constitutional - looks well, in no apparent distress  Eyes - no redness or discharge  Respiratory -breathing appears comfortable.  No cough. Speaking in full sentences, not conversationally dyspneic. No accessory muscle use.Wearing oxygen  Skin - No appreciable discoloration or lesions (very limited exam)  Neurological - No apparent tremors. Speech fluent and articlate  Psychiatric - no signs of delirium or anxiety  .         Data: "   All laboratory and imaging data reviewed.      No results found for this or any previous visit (from the past 168 hour(s)).      PFT interpretation: March 25, 2021    Date Place TLC (%) FVC (%) FEV1 (%) FEV1/FVC DLCO (%) Note   11/11/21 [facility]   2.17 70 0.53 21 25   FVC improved c/w previous [id number]. FEV1 by 80ml. Severe obstructive lung disease.   3/11/20 [facility]   1.66 50 0.45 17 27% 2.78 11 No significant bronchodilatory response   9/24/15 [facility]     0.89 33 17%   No bronchodilatory response                                 6MWT Distance:       Serology:  CMV:  EBV:  HSV:     Micro:  4/30/2019 MYCOBACTERIUM PEREGRINUM      Labs:   Last A1c 5.1 11/13/2020  Alpha 1 AT: 1/27/21: Level 150           Imaging:       Chest CT w contrast: 1/12/20  Moderate cardiomegaly with left ventricular hypertrophy.  Moderate pericardial  effusion without evidence of compression    IMPRESSION:  Advanced emphysematous changes with likely superimposed interstitial edema.  Small to moderate right and small left pleural effusions with accompanying lower lobe atelectasis.  Moderate pericardial effusion.  Negative for pulmonary embolus.         Cardiac:     Most Recent TTE:  ECHO: 1/12/20  Summary:    * Left ventricular segmental wall motion is normal.    * The estimated ejection fraction is 55-60%.    * The right ventricle is enlarged.    * Reduced right ventricular systolic function.    * There is aortic valve sclerosis.    * There is trace mitral regurgitation.    * There is trace tricuspid regurgitation.    * Borderline pulmonary hypertension, estimated pulmonary arterial Systolic pressure is  39 mmHg.    * The right atrium is mildly enlarged.    * There is small to moderate cirumferential pericardial effusion visualized.    * No hemodynamic findings consistent with tamponade physiology.    * Prior study from 03/01/2019.    * Pericardial effusion is new.    Stress Test/Angiogram:    RHC:          GI:   EGD [place]          Endo   DEXA:         Health Maintenance     Mammogram: 10/2020    ASSESSMENT:   1.  ACR BI-RADS Category 2: Benign  2.  Dense Breast Tissue  3.  TLR = 8.2%, Low Risk Category     PAP: 07/2019 Negative, HPV negative  Abnormal Pap smear of cervix 02/20/2017   with High Risk HPV. Atypical Endocervical Cells, Positive HPV      Colonoscopy: no polyps, 2015 recc 10 year follow up     Dental: Two years - Dentures. Full dentures upper and lower. Still with native teeth.

## 2021-11-12 ENCOUNTER — TELEPHONE (OUTPATIENT)
Dept: TRANSPLANT | Facility: CLINIC | Age: 61
End: 2021-11-12
Payer: MEDICARE

## 2021-11-12 ASSESSMENT — ANXIETY QUESTIONNAIRES
GAD7 TOTAL SCORE: 0
IF YOU CHECKED OFF ANY PROBLEMS ON THIS QUESTIONNAIRE, HOW DIFFICULT HAVE THESE PROBLEMS MADE IT FOR YOU TO DO YOUR WORK, TAKE CARE OF THINGS AT HOME, OR GET ALONG WITH OTHER PEOPLE: NOT DIFFICULT AT ALL
GAD7 TOTAL SCORE: 0
2. NOT BEING ABLE TO STOP OR CONTROL WORRYING: NOT AT ALL
1. FEELING NERVOUS, ANXIOUS, OR ON EDGE: NOT AT ALL
6. BECOMING EASILY ANNOYED OR IRRITABLE: NOT AT ALL
7. FEELING AFRAID AS IF SOMETHING AWFUL MIGHT HAPPEN: NOT AT ALL
5. BEING SO RESTLESS THAT IT IS HARD TO SIT STILL: NOT AT ALL
3. WORRYING TOO MUCH ABOUT DIFFERENT THINGS: NOT AT ALL

## 2021-11-12 ASSESSMENT — PATIENT HEALTH QUESTIONNAIRE - PHQ9
5. POOR APPETITE OR OVEREATING: NOT AT ALL
SUM OF ALL RESPONSES TO PHQ QUESTIONS 1-9: 3

## 2021-11-12 NOTE — TELEPHONE ENCOUNTER
Transplant Social Work Services Progress Note      Follow up email to patient sent today:    Ben Carrizales,  I asked the transplant finance phylicia to look into what kind of Medicaid/Medical Assistance coverage you have.  It turns out, you do not have full Medicaid benefits.  You are what is called a  Qualified Medicare Beneficiary    What that means is that when Medicare pays for something, Medicaid picks up the co-pays.  If Medicare doesn t cover something,  Medicaid doesn t pay anything either.  Unfortunately, this means your lodging after transplant will not be covered by Medicare or Medicaid.      I know this is not good news.  I have access to a brenna that will allow me to pay $1750 (about one month s rent) towards your lodging.  That will mean you and your family will be responsible for the costs of the other 2 months.  You certainly could choose to stay with friends or family in the metro area during that time.  But, if you need to stay at the [facility], those months would be private pay.  I have attached the information sheet about the [facility] for your reference.      Call me Monday if you have questions or concerns about this.    Take care,   Mana

## 2021-11-12 NOTE — PROGRESS NOTES
Add:  Completed SLUMs in clinic today.  SLUMS - 23.  Indicating mild cognitive impairment.     Mana Valdivia, [facility]  Lung Transplant   Phone: [phone] Pager: [phone]

## 2021-11-15 DIAGNOSIS — Z01.818 ENCOUNTER FOR PRE-TRANSPLANT EVALUATION FOR LUNG TRANSPLANT: ICD-10-CM

## 2021-11-15 DIAGNOSIS — J44.9 CHRONIC OBSTRUCTIVE PULMONARY DISEASE, UNSPECIFIED COPD TYPE (H): ICD-10-CM

## 2021-11-15 DIAGNOSIS — R91.8 PULMONARY NODULES: Primary | ICD-10-CM

## 2021-11-15 LAB
PETH BLD-MCNC: NEGATIVE NG/ML
PROTOCOL CUTOFF: NORMAL
SA 1 CELL: NORMAL
SA 1 TEST METHOD: NORMAL
SA 2 CELL: NORMAL
SA 2 TEST METHOD: NORMAL
SA1 HI RISK ABY: NORMAL
SA1 MOD RISK ABY: NORMAL
SA2 HI RISK ABY: NORMAL
SA2 MOD RISK ABY: NORMAL
UNACCEPTABLE ANTIGENS: NORMAL
UNOS CPRA: 0
ZZZSA 1  COMMENTS: NORMAL
ZZZSA 2 COMMENTS: NORMAL

## 2021-11-17 LAB
COTININE SERPL-MCNC: <1 NG/ML
NICOTINE SERPL-MCNC: <1 NG/ML

## 2021-11-18 LAB — LABCORP INTERFACED MISCELLANEOUS TEST RESULT: NORMAL

## 2021-11-18 NOTE — PROGRESS NOTES
Lung Transplant RNCC Clinic Note from visit 11/11/21 with Harshad Gong.    Sofie Rodriguez remains active on the lung transplant list.  Updated LAS using most recent testing from today's visit.  O2 3-4 L rest        4-5 L w activity  Assisted Ventilation: Trilogy NOC and throughout the day  Not diabetic  Limited mobility, karnofsky 70%  Met with Social work today.  Nicotine and peth testing performed today.    Vaccinations:  Will need third Hep B after 03/2022 to complete recc'd transplant vaccinations.    After discussion with Dr Mir Hannon and Farideh we will have a three month follow up catscan for nodules of concern due 02/2022. Remain active on list.     Remains in pulm rehab.    Primary Care  Mammo 10/2020 Due now  Pap 11/2019 negative, HPV negative - Confirm follow up as hx of abnormal (2022 vs 2024)  Dental - yearly, upper and lower dentures with some native teeth left  Colonoscopy no polyps, 2015 recc 10 year follow up

## 2022-01-25 ENCOUNTER — TELEPHONE (OUTPATIENT)
Dept: TRANSPLANT | Facility: CLINIC | Age: 62
End: 2022-01-25
Payer: MEDICARE

## 2022-01-25 DIAGNOSIS — J44.9 CHRONIC OBSTRUCTIVE PULMONARY DISEASE, UNSPECIFIED COPD TYPE (H): ICD-10-CM

## 2022-01-25 DIAGNOSIS — R91.8 PULMONARY NODULES: ICD-10-CM

## 2022-01-25 DIAGNOSIS — Z01.818 ENCOUNTER FOR PRE-TRANSPLANT EVALUATION FOR LUNG TRANSPLANT: Primary | ICD-10-CM

## 2022-01-25 NOTE — TELEPHONE ENCOUNTER
Returned call to Sofie who requested an updated oxygen rx be sent to DME provider [facility].   Sofie's breathing has remained stable she tells me since her last visit in November. Her exercise consists of walking her hallways in her home, stretch and strengh bands. She reports her weight has been stable.  Reminded her that if she is prescribed anbx or steroids for exacerbation to be sure and update me so I can track her condition.    She has dropped off two AFB samples which have been negative, 12/7/21 and 1/17/22.  She will make an appt for her last Hep B vaccine.  She will have her PRA drawn soon using  kits.  We will have our  set her up with her 3 month chest CT follow up in February for surveillance of nodules.  We will schedule her RTC with Dr Claribel Franks with OS required testing for late April.  Her primary care testing (PAP, dental, mammo and colonoscopy) are UTD.  I encouraged her to attend lung transplant support group and verified she has the information on how to join these meetings on Mondays.  We will resend Hep C consent via email, she previously gave verbal consent and is listed in [facility] as willing to accept Hep C + donors.    She remains ACTIVELY LISTED FOR LUNG TRANSPLANT.

## 2022-02-02 DIAGNOSIS — Z01.818 ENCOUNTER FOR PRE-TRANSPLANT EVALUATION FOR LUNG TRANSPLANT: Primary | ICD-10-CM

## 2022-02-02 DIAGNOSIS — Z51.81 ENCOUNTER FOR THERAPEUTIC DRUG LEVEL MONITORING: ICD-10-CM

## 2022-02-02 DIAGNOSIS — J44.9 COPD (CHRONIC OBSTRUCTIVE PULMONARY DISEASE) (H): ICD-10-CM

## 2022-03-02 DIAGNOSIS — Z76.82 AWAITING TRANSPLANTATION OF LUNG: ICD-10-CM

## 2022-03-02 DIAGNOSIS — Z11.59 ENCOUNTER FOR SCREENING FOR OTHER VIRAL DISEASES: ICD-10-CM

## 2022-03-02 DIAGNOSIS — J44.9 COPD (CHRONIC OBSTRUCTIVE PULMONARY DISEASE) (H): Primary | ICD-10-CM

## 2022-03-04 ENCOUNTER — ORGAN (OUTPATIENT)
Dept: TRANSPLANT | Facility: CLINIC | Age: 62
End: 2022-03-04
Payer: MEDICARE

## 2022-03-04 DIAGNOSIS — Z76.82 AWAITING ORGAN TRANSPLANT: Primary | ICD-10-CM

## 2022-03-15 DIAGNOSIS — R06.09 DYSPNEA ON EXERTION: ICD-10-CM

## 2022-03-15 DIAGNOSIS — Z01.818 ENCOUNTER FOR PRE-TRANSPLANT EVALUATION FOR LUNG TRANSPLANT: Primary | ICD-10-CM

## 2022-03-15 DIAGNOSIS — J44.9 CHRONIC OBSTRUCTIVE PULMONARY DISEASE, UNSPECIFIED COPD TYPE (H): ICD-10-CM

## 2022-03-17 ENCOUNTER — ALLIED HEALTH/NURSE VISIT (OUTPATIENT)
Dept: RESEARCH | Facility: CLINIC | Age: 62
End: 2022-03-17
Payer: MEDICARE

## 2022-03-17 DIAGNOSIS — Z00.6 EXAMINATION OF PARTICIPANT OR CONTROL IN CLINICAL RESEARCH: Primary | ICD-10-CM

## 2022-03-17 NOTE — PROGRESS NOTES
Surgery Clinical Trials Office Informed Consent Process Documentation  Tracking Graft Antigen-Specific CD4+ T-cells in Lung Transplant Recipients.  IRB#[id number]    ICF Version Date 8/27/2021 / IRB Approval Date: 10/11/2021     Surgery Clinical Trials Office Informed Consent Process Documentation  Tracking Graft Antigen-Specific CD4+ T-cells in Lung Transplant Recipients.  IRB#[id number]    ICF Version Date 8/27/2021 / IRB Approval Date: 10/11/2021     Date of Consent : 4/5/2022    The subject was screened and meets all of the inclusion criteria and none of the exclusion criteria is met. This subject was previously mailed a consent form and contacted by the research team. The subject verbally confirmed interest over the phone and mailed back a signed HIPAA form and an informed consent form. This note is documenting that the study team has received the enrollment forms and that the patient was re contacted by the study team and confirmed enrollment over the phone.     The subject was told:  -that the study involves research   -the purpose of the research study  -the expected duration of the study and the approximate number of subject sought  -of procedures that are identified as experimental  -of reasonably foreseeable risks or discomforts to the subject  -of any benefits to the subject or others that may be expected from the research  -of alternative procedures and/or treatment  -how the confidentiality of records would be maintained  -whether or not compensation and medical treatments are available should injury occur as a result of the study  -who to contact if they have questions related to the research study or questions regarding research subjects' rights  -that participation is completely voluntary and that their decision to or not to participate will have no impact on their relationships with the [facility] and the staff    No study procedures were completed prior to the consent being obtained.  The use of historical  information (lab or assessments) used for the purpose of the study was approved by subject.  The subject was fully aware that we would be reviewing their medical record for the study.  The subject demonstrated an understanding of what the study involved.  Specifically, how this study differed from standard of care at our center and what was required of the subject as part of the study.  The subject reviewed the consent form and was given the opportunity to ask questions before signing.  Questions and concerns were answered by the study staff and/or study physician.  A copy of the signed informed consent document was offered to the subject:  [x] Yes [] No     Patient requested a copy.   The consent require the use of a :   [] Yes [x]   No                               A 'short form' consent was used:     [] Yes [x]  No                                                                                       If yes, provide name of witness:   The subject required a legally-authorized representative (LAR) to sign on their behalf:                                                    [] Yes    [x] No                  If yes, please record name of LAR:     Questions to Evaluate Subject Comprehension of Study:     Question: Adequate Response? If No, explain what actions were taken   What is being studied? [x] Yes   [] No     If you participate, what will be different than if you decide not to participate?  [x] Yes   [] No     How long will the study last; will you be required to return for visits? [x] Yes  [] No     What kinds of risks are there? [x] Yes  [] No        Do you understand that you can withdraw consent at any time and for any reason while participating in the study? [x] Yes  [] No        Study Coordinators:  Aylin Luong  [phone]    [email or web address]      :      Timmy Hook       [phone]    [email or web address]  PI:  [email or web address]    Study Coordinators:  Aylin Luong  [phone]     [email or web address]      :      Timmy Hook       [phone]    [email or web address]  PI:  [email or web address]

## 2022-03-26 ENCOUNTER — HEALTH MAINTENANCE LETTER (OUTPATIENT)
Age: 62
End: 2022-03-26

## 2022-04-04 ENCOUNTER — ANCILLARY PROCEDURE (OUTPATIENT)
Dept: CT IMAGING | Facility: CLINIC | Age: 62
End: 2022-04-04
Attending: INTERNAL MEDICINE
Payer: MEDICARE

## 2022-04-04 DIAGNOSIS — Z01.818 ENCOUNTER FOR PRE-TRANSPLANT EVALUATION FOR LUNG TRANSPLANT: ICD-10-CM

## 2022-04-04 DIAGNOSIS — J44.9 CHRONIC OBSTRUCTIVE PULMONARY DISEASE, UNSPECIFIED COPD TYPE (H): ICD-10-CM

## 2022-04-04 DIAGNOSIS — R91.8 PULMONARY NODULES: ICD-10-CM

## 2022-04-04 PROCEDURE — G1004 CDSM NDSC: HCPCS | Performed by: RADIOLOGY

## 2022-04-04 PROCEDURE — 71250 CT THORAX DX C-: CPT | Mod: MG | Performed by: RADIOLOGY

## 2022-04-25 DIAGNOSIS — Z00.6 EXAMINATION OF PARTICIPANT OR CONTROL IN CLINICAL RESEARCH: Primary | ICD-10-CM

## 2022-04-28 NOTE — PROGRESS NOTES
RD assessing frailty. Saw pt for txp eval 6/2021 Not Frail (2/5 points)- low activity and exhaustion.  In June: she reports slow weight gain, 136 at this time  Was involved in pulm rehab 2x/week- weight lifting (10-15 min), treadmill (15 min), bike (15 min), arm bike (10 min)- started April/May; resistance band exercises     Today: Not Frail (0/5)    -- Weight: trending up, but ok with this   -- Exhaustion/Energy Levels: feels functional status is fairly stable since last time I saw her   -- Activity: some walking 'forcing self to do'; going to do pulmonary rehab again

## 2022-04-29 ENCOUNTER — LAB (OUTPATIENT)
Dept: LAB | Facility: CLINIC | Age: 62
End: 2022-04-29
Attending: INTERNAL MEDICINE
Payer: MEDICARE

## 2022-04-29 ENCOUNTER — ALLIED HEALTH/NURSE VISIT (OUTPATIENT)
Dept: TRANSPLANT | Facility: CLINIC | Age: 62
End: 2022-04-29
Attending: INTERNAL MEDICINE
Payer: MEDICARE

## 2022-04-29 VITALS
BODY MASS INDEX: 24.8 KG/M2 | DIASTOLIC BLOOD PRESSURE: 79 MMHG | SYSTOLIC BLOOD PRESSURE: 154 MMHG | WEIGHT: 140 LBS | HEART RATE: 99 BPM | OXYGEN SATURATION: 93 %

## 2022-04-29 DIAGNOSIS — Z01.818 ENCOUNTER FOR PRE-TRANSPLANT EVALUATION FOR LUNG TRANSPLANT: ICD-10-CM

## 2022-04-29 DIAGNOSIS — Z11.59 ENCOUNTER FOR SCREENING FOR OTHER VIRAL DISEASES: ICD-10-CM

## 2022-04-29 DIAGNOSIS — J44.9 CHRONIC OBSTRUCTIVE PULMONARY DISEASE, UNSPECIFIED COPD TYPE (H): ICD-10-CM

## 2022-04-29 DIAGNOSIS — Z51.81 ENCOUNTER FOR THERAPEUTIC DRUG LEVEL MONITORING: ICD-10-CM

## 2022-04-29 DIAGNOSIS — R91.1 LUNG NODULE: ICD-10-CM

## 2022-04-29 DIAGNOSIS — R06.09 DYSPNEA ON EXERTION: ICD-10-CM

## 2022-04-29 DIAGNOSIS — M85.88 OSTEOPENIA OF LUMBAR SPINE: Primary | ICD-10-CM

## 2022-04-29 DIAGNOSIS — Z76.82 AWAITING TRANSPLANTATION OF LUNG: ICD-10-CM

## 2022-04-29 DIAGNOSIS — R09.02 HYPOXIA: ICD-10-CM

## 2022-04-29 DIAGNOSIS — Z00.6 EXAMINATION OF PARTICIPANT OR CONTROL IN CLINICAL RESEARCH: ICD-10-CM

## 2022-04-29 DIAGNOSIS — J47.9 BRONCHIECTASIS (H): ICD-10-CM

## 2022-04-29 DIAGNOSIS — J44.9 COPD (CHRONIC OBSTRUCTIVE PULMONARY DISEASE) (H): ICD-10-CM

## 2022-04-29 LAB
6 MIN WALK (FT): 1000 FT
6 MIN WALK (M): 305 M
ALBUMIN SERPL-MCNC: 3.8 G/DL (ref 3.4–5)
ALP SERPL-CCNC: 102 U/L (ref 40–150)
ALT SERPL W P-5'-P-CCNC: 21 U/L (ref 0–50)
ANION GAP SERPL CALCULATED.3IONS-SCNC: 3 MMOL/L (ref 3–14)
AST SERPL W P-5'-P-CCNC: 18 U/L (ref 0–45)
BASE EXCESS BLDV CALC-SCNC: 14.1 MMOL/L (ref -7.7–1.9)
BILIRUB SERPL-MCNC: 0.3 MG/DL (ref 0.2–1.3)
BUN SERPL-MCNC: 21 MG/DL (ref 7–30)
CALCIUM SERPL-MCNC: 9.7 MG/DL (ref 8.5–10.1)
CD19 CELLS # BLD: 41 CELLS/UL (ref 107–698)
CD19 CELLS NFR BLD: 5 % (ref 6–27)
CD3 CELLS # BLD: 544 CELLS/UL (ref 603–2990)
CD3 CELLS NFR BLD: 70 % (ref 49–84)
CD3+CD4+ CELLS # BLD: 387 CELLS/UL (ref 441–2156)
CD3+CD4+ CELLS NFR BLD: 50 % (ref 28–63)
CD3+CD4+ CELLS/CD3+CD8+ CLL BLD: 2.05 % (ref 1.4–2.6)
CD3+CD8+ CELLS # BLD: 188 CELLS/UL (ref 125–1312)
CD3+CD8+ CELLS NFR BLD: 24 % (ref 10–40)
CD3-CD16+CD56+ CELLS # BLD: 184 CELLS/UL (ref 95–640)
CD3-CD16+CD56+ CELLS NFR BLD: 24 % (ref 4–25)
CHLORIDE BLD-SCNC: 95 MMOL/L (ref 94–109)
CO2 SERPL-SCNC: 42 MMOL/L (ref 20–32)
CREAT SERPL-MCNC: 0.84 MG/DL (ref 0.52–1.04)
GFR SERPL CREATININE-BSD FRML MDRD: 80 ML/MIN/1.73M2
GLUCOSE BLD-MCNC: 112 MG/DL (ref 70–99)
HCO3 BLDV-SCNC: 45 MMOL/L (ref 21–28)
Lab: NORMAL
O2/TOTAL GAS SETTING VFR VENT: 3 %
PCO2 BLDV: 89 MM HG (ref 40–50)
PERFORMING LABORATORY: NORMAL
PH BLDV: 7.31 [PH] (ref 7.32–7.43)
PO2 BLDV: 38 MM HG (ref 25–47)
POTASSIUM BLD-SCNC: 4.2 MMOL/L (ref 3.4–5.3)
PROT SERPL-MCNC: 8.3 G/DL (ref 6.8–8.8)
SODIUM SERPL-SCNC: 140 MMOL/L (ref 133–144)
SPECIMEN STATUS: NORMAL
T CELL EXTENDED COMMENT: ABNORMAL
TEST NAME: NORMAL

## 2022-04-29 PROCEDURE — 86360 T CELL ABSOLUTE COUNT/RATIO: CPT | Performed by: INTERNAL MEDICINE

## 2022-04-29 PROCEDURE — 94729 DIFFUSING CAPACITY: CPT | Performed by: INTERNAL MEDICINE

## 2022-04-29 PROCEDURE — 99000 SPECIMEN HANDLING OFFICE-LAB: CPT | Performed by: PATHOLOGY

## 2022-04-29 PROCEDURE — 86833 HLA CLASS II HIGH DEFIN QUAL: CPT | Performed by: INTERNAL MEDICINE

## 2022-04-29 PROCEDURE — 86832 HLA CLASS I HIGH DEFIN QUAL: CPT | Performed by: INTERNAL MEDICINE

## 2022-04-29 PROCEDURE — 94375 RESPIRATORY FLOW VOLUME LOOP: CPT | Performed by: INTERNAL MEDICINE

## 2022-04-29 PROCEDURE — 94726 PLETHYSMOGRAPHY LUNG VOLUMES: CPT | Performed by: INTERNAL MEDICINE

## 2022-04-29 PROCEDURE — 80053 COMPREHEN METABOLIC PANEL: CPT | Performed by: PATHOLOGY

## 2022-04-29 PROCEDURE — 80307 DRUG TEST PRSMV CHEM ANLYZR: CPT | Performed by: PATHOLOGY

## 2022-04-29 PROCEDURE — 99215 OFFICE O/P EST HI 40 MIN: CPT | Mod: 25 | Performed by: INTERNAL MEDICINE

## 2022-04-29 PROCEDURE — 82803 BLOOD GASES ANY COMBINATION: CPT | Performed by: PATHOLOGY

## 2022-04-29 PROCEDURE — 80321 ALCOHOLS BIOMARKERS 1OR 2: CPT | Mod: 90 | Performed by: PATHOLOGY

## 2022-04-29 PROCEDURE — 86769 SARS-COV-2 COVID-19 ANTIBODY: CPT | Mod: 90 | Performed by: PATHOLOGY

## 2022-04-29 PROCEDURE — 36415 COLL VENOUS BLD VENIPUNCTURE: CPT | Performed by: PATHOLOGY

## 2022-04-29 RX ORDER — FUROSEMIDE 20 MG
20 TABLET ORAL DAILY
Qty: 60 TABLET | Refills: 3 | COMMUNITY
Start: 2022-04-29 | End: 2022-08-15

## 2022-04-29 NOTE — PATIENT INSTRUCTIONS
Transplant Clinic Discharge Instructions    Pulmonary Rehab: Please remember to stay active.  Continue exercises learned in pulmonary rehab or continue participating in pulmonary rehab, if able. We encourage you to try and be active on days that you are not in pulmonary rehab as well. Walking is a great form of exercise. We encourage you to continue light weights as well to maintain muscle mass.    FOLLOW UP/PLAN  3-4 month follow up with Dr Franks with pulmonary function tests, labs, chest catscan  Drop sputum sample when able  Endocrinology referral for low bone density- Make sure you are taking daily calcium and vitamin D   Health Psychology referral - to discuss anxiety surrounding lung transplant  Please schedule a Dermatology visit for annual skin check  Complete Pulm Rehab  Try Duonebs via nebulizer twice a day  Increase oxygen to 10-12 L with activity- new rx sent to [facility]  Completed an Overnight oximetry  test on 4 L/ trilogy  Look into [id number] NIV (mobile noninvasive ventilation)  Maintain weight  Support person (one of your children) MUST be present at all appointments  We encourage participation in lung transplant support group  Follow blood pressures - may need additional treatment if continue to run high. This can be managed by your primary care provider.    Please remember to stay up to date with your primary care requirements including:                Annual check-ups with primary care physician   Mammogram   Pap smear (as appropriate for women)    PSA (for men over 50)   Dental visits   Annual flu shots/ immunizations as needed   Colonoscopy (patients over 50).     Thank-you for allowing us to participate in your care.    Please contact your transplant coordinator, Katlin Marquez at [phone] with any questions, concerns or updates (change in medications, illness, hospital admission, change in oxygen needs, change in insurance coverage, change of phone number or address, etc).    Thoracic  Transplant Office phone [phone], fax [phone]    Office Hours 8:30 - 5:00 pm

## 2022-04-29 NOTE — PROGRESS NOTES
[facility]  Transplant Clinic  4/29/22         Assessment and Plan:   Sofie Rodriguez is a 60 year old female with history of COPD and bronchiectasis  who is seen today in follow up for lung transplantation consideration. She completed lung transplant evaluation in 6/2021 and is currently listed.    Severe lung disease: Due to COPD/Bronchiectasis. Has been intubated x3 in the past. Albuterol inhaler uses during pulm rehab, Breo incruse daily, Ellipta incruse daily. Discussed with Sofie that she needs to consistently bring her support person with her to visits. She does have a plan for her children to hlep her post transplant but would like to see continued commitment pretransplant. Her FEV1 is down today and her 6MWT demonstrates significant hypoxia requiring 10L with walking as well as significant chronic hypercpanea by VbG. She actually states that while she feels dyspneic she feels overall okay, no recent exacerbations. She denies AM headaches, and is using her trilogy at night. She also recently turned down a pair of lungs, which we discussed today and addressed her concerns. Restarting pulm rehab. Encouraged not to turn down lungs again.   Recurrent exac: None recently.  Rehab: Has completed 3 to 4 times.  Vaccinations: Uptodate    Transplant Waitlist- Active:   ABO: O  LAS:   Plan: Bilateral  Serostatus: CMV R+, EBV R+, VZV R+  NURIA Index: 6  Frailty: 3 frail   PO: No need for NPO post transplant    Chronic hypoxic/hypercapneic resp failure: Using oxygen appropriately.  - Start [id number] application    Mycobacterium Peregrinum: AFB sputum cultures from 6/14/21 was neg. Repeat q 3 months.   - Sputum cup sent home    Anxiety: She doesn't feel she has a lot of anxiety at baseline, but does report that she sometimes gets nervous thinking about transplant and I suspect this may have been what caused her to turn down the lungs on her most recent offer.  -  Referral to health psychology    Pulmonary nodules: Noted on chest CT in 6/2021. Reviewed with pulmonary nodule team  PET CT (7/2021): Most recent CT scan 4/4/21 with an unchanged RML mm nodules and then right minor fissure intrapulmonary lymph nodes.    - Follow CTs q 3 months    HECTOR: Underwent a LEEP procedure, she reports she would not need another pap smear for 3-5 years but will follow up.    H/o Hep C: Diagnosed in 1980s, 2 mos of treatment, quant negative since 10/2017, last positive 2/20/17 ([phone]).Glenis positive on 6/2021 with negative HCV PCR. Hx of remote ETOH abuse.   - MR Elastography on 4/27/21, has seen GI and no concern with transplant    H/o Depression:   H/o drug abuse: Extensive, frequent meth and daily marijuana until 2019. Had previously been to rehab however most recently quit cold turkey. Quit smoking 11/2020. ETOH use 1-2 times/month.  - Random PETH and nicotine testing    Low bone density Osteopenia: Noted on DEXa from 6/2021. Vit D level was wnl.  - Refer to Endocrinology     H/o Steroid induced hyperglycemia: Last HgbA1c was 5.1 on 11/13/2020.    Issues to be considered:  - Follow up on pulmonary nodules q 3 months  - AFB cultures [id number], script given in clinic  - Endocrinology referral  - Behavioral Health Psychology referral  - Noc ox with 2L and see what she needs  - Trial [id number]  - Use trilogy at night and during day intermittently  - Follow up dermatology visit    Coordinator/MD: Katlin Marquez/Claribel Franks    RTC: 3 months  Influenza and other vaccinations: Uptodate on Influenza and C19 vaccinations.  Annual dermatology visit: Due    I personally spent 60  minutes in documentation, the interview and exam, and review of the chart/labs/imaging excluding time spent interpreting spirometry on 4/29/22.           Problem List:     1. Evaluation for lung transplantation  2. CAD  a. Chest CT notes multivessel calcifications  3. Hepatitis C: Diagnosed in 1980s, 2 mos of treatment, quant  "negative since 10/2017, last positive 2/20/17 ([phone])  4. Mental Health  a. Episodes of depression when younger  5. Steroid Induced Hyperglycemia  6. ?Diastolic Dysfunction  7. Hx of colonization with Mycobacterium peregrinum     Subjective HPI   4/29/22  She feels like she's been \"okay.\" She has a productive cough generally daily. No hemoptysis or streaking blood. No fevers, chills, or sweats. She has been walking for exercise. She's going to restart her pulm rehab. Using albuterol a couple times a day. No acid reflux or heartburn. Started lasix 20 mg daily. She began having some increased leg swelling. Gained 25 lbs in the last 2 years. SHe is going to restart pulm rehab. Denies any new wheezing, continues to have dyspnea with minimal activity but still attempts to maintain her activity level.     Oxygen Use:   With exertion 5-6L, needs to go up to 10L  2L at rest  2L with sleep  Using trilogy at night and periodically throughout the day    Current Meds:  Duonebs couple times a week  Albuterol inhaler uses during pulm rehab  Breo incruse daily  Ellipta incruse daily  [id number]  MV    (11/10/2021):   Since the last visit she had COPD exacerbation which is associated with coughing up greenish phlegm and right-sided soreness with sharp pains.  She was treated with prednisone and azithromycin starting on 10 29 2021 for symptoms that started 2 days prior to that she completed her azithromycin on 11 4 2021.    3/26/21  She has some cough and brings up white phlegm occ. No new CP. Occ Wheeze. Sleeps fairly well. Has Mild PND>  Sofie Rodriguez is a 60 year old female with history of COPD/Bronchiectasis who is seen today for evaluation for lung transplantation. She is referred by Dr. Josh Patel, and Pauline Madrigal NP from [facility] with a history of COPD/Bronchiectasis.     She reportedly smoked from a young age and has experienced dyspnea intermittently since 10/2013. She went to a doctor and was found to be 82% on RA " and was sent to the hospital where she was diagnosed with COPD and started on continuous oxygen. She was also diagnosed with CHF at the time and was diuresed aggressively. She is usually hospitalized about 1-2 times a year, but her last major hospitalization was in 1/2020, she required intubation during her hospitalization, she states she has been intubated 3 times in the past. She has not required steroids in the last year.     She continues to have shortness of breath that is overall unchanged from previous.  She continues to do exercises with some walking and resistance bands.  She is doing some laundry and cleaning.  She mostly microwaves or cooks on a griddle.    She can maybe only walk a block before she gets short of breath. She can walk a flight of stairs without stopping but has to stop and take a break at the top.   She is doing strengthening exercises with resistance bands. She can do her ADLs and IADLs without stopping but does get short of breath. She is able to leave the house, but her son drives her.   She lives with her son and feels better at home.   She hasn't lost any significant weight since the last visit.  She has no history of heartburn or acid reflux, denies waterbrash. No reported aspiration symptoms.   During exacerbations she'll feel more tired, and has a more productive cough green in nature, wheezes when sick. She does bring up sputum every day even when sick, although the volume is usually fairly low.     She is attending pulmonary rehab currently, twice weekly, and just started on March 8, 2021 this is her third or fourth time doing    Regarding her drug use history, she self quit meth and marijuana about 2 years ago but does not attend any meetings or rehab.       Oxygen Use:   At rest 3L  Sleep: 3L bleed in with Trilogy, using 3-4 nights/week, will use trilogy during the day few times a week  With activity: 4-5L       Review of Systems:   Please see HPI, otherwise the complete 10  point ROS is negative.           Past Medical and Surgical History:     Past Medical History:   Diagnosis Date     CHF (congestive heart failure) (H)      COPD (chronic obstructive pulmonary disease) (H)      Hepatitis 2017    Hep C, [facility]     HTN (hypertension)      Osteopenia      Past Surgical History:   Procedure Laterality Date     COLONOSCOPY  2015     CV CORONARY ANGIOGRAM N/A 6/30/2021    Procedure: CV CORONARY ANGIOGRAM;  Surgeon: Alexander Cuellar MD;  Location: [facility]     CV RIGHT HEART CATH MEASUREMENTS RECORDED N/A 6/30/2021    Procedure: CV RIGHT HEART CATH;  Surgeon: Alexander Cuellar MD;  Location: [facility]     ENT SURGERY  1974    tonsillectomy     HAND SURGERY       LEEP TX, CERVICAL  04/07/2017    HECTOR III     LYMPH NODE BIOPSY Left 2005    Left axilla, benign- [place]     Hepatitis C, diagnosed in the 1982. Her viral loads were back up in 2017 and then she was treated for it for 2 months.: Hep C quant negative since 10/2017, last positive 2/20/17 ([phone])        Family History:     Grandpa: MI  Mom: Skin ca, cervical ca  Dad: Prostate ca, CAD, d. At age 82  Brother: COPD (Smoker)  Brother: No medical care  Sister: healthy  Sister: Healthy         Social History:     Tobacco: Cigarettes smoked for 30-35 years 1-1.5ppd, quit in 11/11/20  ETOH: Very seldom, maybe 1-2 times/month bacardi limon or 2 glasses of wine. Reports heavy use as a teenager/young adult. Went through chem dep for alcohol use once in adolescence, and then around ages [age]. 2 DUIs, one in 1998 and then in 2008.   Illicits: Hx of Meth few times per week for about 15-20 years, last used 3/2019ish and marijuana frequently at times daily since age 17, quit around 3/2019ish. Stopped smoking marijuana because she couldn't breathe, the meth she quit b/c she just didn't feel healthy.  Exposures: Her old house probably had mold in it which may account for frequency of exacerbations.   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   Pets: None    Social: Single, 3 children, currently living with son, 2 daughters.          Medications:     albuterol (PROAIR HFA/PROVENTIL HFA/VENTOLIN HFA) 108 (90 BASE) MCG/ACT Inhaler, Inhale 2 puffs into the lungs every 6 hours as needed for shortness of breath / dyspnea or wheezing  aspirin 81 MG EC tablet, Take 81 mg by mouth daily  fluticasone-vilanterol (BREO ELLIPTA) 100-25 MCG/INH oral inhaler, Inhale 1 puff into the lungs daily 25 mcg  furosemide (LASIX) 20 MG tablet, Take 1 tablet (20 mg) by mouth daily  ipratropium (ATROVENT) 0.02 % nebulizer solution, Take 0.5 mg by nebulization daily 0.5md daily  Multiple Vitamin (QUINTABS) TABS, Take 1 tablet by mouth daily  umeclidinium (INCRUSE ELLIPTA) 62.5 MCG/INH oral inhaler, Inhale 1 puff into the lungs daily 62.5mcg    No current facility-administered medications on file prior to visit.           Physical Exam:   BP (!) 154/79   Pulse 99   Wt 63.5 kg (140 lb)   SpO2 93%   BMI 24.80 kg/m     Gained 20 lbs in 1 year    Gen: AA&Ox3, no acute distress  HEENT:AT/ NC, PERRL b/l, EOM grossly intact, mucous membranes pink, moist without plaque or exudate  PULM/THORAX: Very diminished breath sounds throughout, no rales/rhonchi/wheeazes  CV:tachycardic, regular, S1 and S2 appreciated, no extra heart sounds, murmurs or rub auscultated. No JVD  ABD: obese, soft, nontender, nondistended. Normoactive bowel sounds x 4, no HSM appreciated.  EXT: very trace edema, no clubbing or cyanosis. No asymmetrical edema or tenderness to palpation in calves bilaterally.             Data:   All laboratory and imaging data reviewed.      Recent Results (from the past 168 hour(s))   Comprehensive metabolic panel    Collection Time: 04/29/22 11:46 AM   Result Value Ref Range    Sodium 140 133 - 144  mmol/L    Potassium 4.2 3.4 - 5.3 mmol/L    Chloride 95 94 - 109 mmol/L    Carbon Dioxide (CO2) 42 (H) 20 - 32 mmol/L    Anion Gap 3 3 - 14 mmol/L    Urea Nitrogen 21 7 - 30 mg/dL    Creatinine 0.84 0.52 - 1.04 mg/dL    Calcium 9.7 8.5 - 10.1 mg/dL    Glucose 112 (H) 70 - 99 mg/dL    Alkaline Phosphatase 102 40 - 150 U/L    AST 18 0 - 45 U/L    ALT 21 0 - 50 U/L    Protein Total 8.3 6.8 - 8.8 g/dL    Albumin 3.8 3.4 - 5.0 g/dL    Bilirubin Total 0.3 0.2 - 1.3 mg/dL    GFR Estimate 80 >60 mL/min/1.73m2   Blood gas venous    Collection Time: 04/29/22 11:46 AM   Result Value Ref Range    pH Venous 7.31 (L) 7.32 - 7.43    pCO2 Venous 89 (HH) 40 - 50 mm Hg    pO2 Venous 38 25 - 47 mm Hg    Bicarbonate Venous 45 (H) 21 - 28 mmol/L    Base Excess/Deficit (+/-) 14.1 (H) -7.7 - 1.9 mmol/L    FIO2 3          PFT interpretation: 4/29/22  PFT Interpretation:  Very severe obstructive ventilatory defect.  Decreased from previous.  Below recent best.   Valid Maneuver      Date Place TLC (%) FVC (%) FEV1 (%) FEV1/FVC DLCO (%) Note   4/29/22    1.82 58 0.51 20 28 6.78 34    11/11/21 [facility]   2.17 70 0.53 21 25   FVC improved c/w previous qh162he. FEV1 by 80ml. Severe obstructive lung disease.   3/11/20 [facility]   1.66 50 0.45 17 27% 2.78 11 No significant bronchodilatory response   9/24/15 [facility]     0.89 33 17%   No bronchodilatory response                                 6MWT Distance:   4/29/22  1000 ft/ 305m 10L NC loco 86%     Serology:  CMV:  EBV:  HSV:     Micro:  4/30/2019 MYCOBACTERIUM PEREGRINUM      Labs:   Last A1c 5.1 11/13/2020  Alpha 1 AT: 1/27/21: Level 150           Imaging:       Chest CT w contrast: 1/12/20  Moderate cardiomegaly with left ventricular hypertrophy.  Moderate pericardial  effusion without evidence of compression    IMPRESSION:  Advanced emphysematous changes with likely superimposed interstitial edema.  Small to moderate right and small left pleural effusions with accompanying lower lobe  atelectasis.  Moderate pericardial effusion.  Negative for pulmonary embolus.         Cardiac:     Most Recent TTE:  6/30/21  Interpretation Summary  Global and regional left ventricular function is normal with an EF of 55-60%.  Mild concentric wall thickening consistent with left ventricular hypertrophy  is present.  Global right ventricular function is normal.  No significant valvular abnormalities.  Pulmonary artery systolic pressure cannot be assessed.  Negative bubble study.  There is no prior study for direct comparison.    ECHO: 1/12/20  Summary:    * Left ventricular segmental wall motion is normal.    * The estimated ejection fraction is 55-60%.    * The right ventricle is enlarged.    * Reduced right ventricular systolic function.    * There is aortic valve sclerosis.    * There is trace mitral regurgitation.    * There is trace tricuspid regurgitation.    * Borderline pulmonary hypertension, estimated pulmonary arterial Systolic pressure is  39 mmHg.    * The right atrium is mildly enlarged.    * There is small to moderate cirumferential pericardial effusion visualized.    * No hemodynamic findings consistent with tamponade physiology.    * Prior study from 03/01/2019.    * Pericardial effusion is new.    Stress Test/Angiogram:  6/30/21  Mild non obstructive CAD with mRCA 30% stenosis    RHC:  6/30/21    Moderately elevated PH mPAP 35 mmHg(PVR 5 ISSA (per [facility]) and PCWP of 15mmHg) consistent with combined pre and post capillary pHTN .  Normal cardiac output            GI:   EGD  [place]  6/15/21  Hiatal hernia present  No [place] CLassification 4.0 abnormalities  Normal high resolution esophageal manometry         Endo   DEXA:  6/14/21  T score -1.9 at hip, Osteoepnia         Health Maintenance     Mammogram: 10/2020    ASSESSMENT:   1.  ACR BI-RADS Category 2: Benign  2.  Dense Breast Tissue  3.  TLR = 8.2%, Low Risk Category     PAP: 07/2019 Negative, HPV negative  Abnormal Pap smear of cervix  02/20/2017   with High Risk HPV. Atypical Endocervical Cells, Positive HPV      Colonoscopy: no polyps, 2015 recc 10 year follow up     Dental: Two years - Dentures. Full dentures upper and lower. Still with native teeth.

## 2022-04-29 NOTE — PROGRESS NOTES
Patient Name: Sofie Rodriguez  : 1960  Age: 61 year old  MRN: [id number]  Date of Initial Social Work Evaluation: 2021    Patient on transplant wait list.  Saw today to update psychosocial assessment.  Complete PHQ-9, NIRANJAN-7, AUDIT and SLUMS. Patient was alone for today's visit.     Presenting Information   Living Situation: currently living with her son, Gera, in split level single family home in [place], [place]  If not local, plans for short term stay:  [place] [facility]  Functional Status: can do light housework.  Needs 5l of O2 with activity  Cultural/Language/Spiritual Considerations: n/a    Support System  Primary Support Person sonGera  Other support:  Daughters, Charity and Julia  Plan for support in immediate post-transplant period:  Per patient today, Julia will be primary caregiver.  Staying with patient for 2 weeks at a time.  Gera and Charity will alternate giving her breaks for a week.    Health Care Directive  Decision Maker: patient  Alternate Decision Maker: adult children  Health Care Directive: Provided education, patient believes she complete one at an outside hospital, but doesn't have copy. Today, she states she would prefer if her children made decisions together.      Mental Health/Coping:   History of Mental Health: denies depression or anxiety.  No hx of mental health.  PHQ-9 = 4,  NIRANJAN-7=0  History of Chemical Health: Yes  Smoked marijuana and meth amphetamine regularly for 30 years.  Quit cold turkey around 2 + years ago.  CD treatment x3 in past without success.  Longest period of sobriety until now, was less than a year. Credits success this time by cutting off ties with using friends. Excellent support from family. Verbalizes commitment to sobriety.   1 drink/ 2 times per month until April.  I instructed her to quit on . Has not had drink since that time. Confirmed continued abstinence  Current status: as above  Coping: distraction, support from family   Services  Needed/Recommended: n/a       Financial   Income: SSD  Impact of transplant on income: none  Insurance and medication coverage: Medicare and Medical Assistance  Financial concerns: worried about cost of lodging.   Patient's medicaid coverage is only a QMB (supplement to medicaid.)  This will not cover lodging.  Patient states she has re-applied for medicaid.  Hoping to be eligible for full benefits.    Resources needed: can use N brenna to cover one month of lodging.  Will encourage patient to explore fundraising and family assist for other 2 months.      Cognitive:   SLUMS = 27    Education provided by SW: Social Work role in transplant program, availability of support groups, parking information and lodging costs and coverage.     Assessment and recommendations and plan:  Patient remains an adequate candidate for transplant.       Mana Valdivia [facility]  Lung Transplant   Phone: [phone] Pager: [phone]

## 2022-04-29 NOTE — NURSING NOTE
Chief Complaint   Patient presents with     RECHECK     Pre-lung tx follow up      Blood pressure (!) 154/79, pulse 99, weight 63.5 kg (140 lb), SpO2 93 %, not currently breastfeeding.    Julia Menon, CMA

## 2022-04-29 NOTE — NURSING NOTE
"Pre Lung Transplant RNCC RTC Note    Sofie Rodriguez 61 year old    Data Unavailable  140 lbs 0 oz  Body mass index is 24.8 kg/m .   Our records shows 20 lb weight gain in past year. Sofie states 25 lb weight gain in past two years. \"I don't watch what I eat at all.\" Counseled about healthy eating and no further weight gain advised.    LISTING PLAN: Actively Listed. Sofie previously declined donor offer due mainly to concerns of donor testing + for COVID. This was discussed in clinic today and we reassured her regarding safety of receiving both COVID and Hep C + donors.     Candidate Medical Urgency Status: Active     Total UNOS Waiting Time: 228 days 5 hrs 29 mins 20 secs   Activation Date: 2021 12:48:30   Lung Diagnosis Code:   COPD/EMPHYSEMA   Lung Allocation Score (LAS): 33.9147                     PSYCH/SOCIAL:  Patient arrived to clinic solo. We discussed we expect a support person and someone that is identified as part of her post transplant care giver plan to attend all transplant clinic appointments with her. This would be one of her children.  Mental Health: Denies depressive symptoms. Acknowledges some anxiety but denies being problematic. She is open to talking with Health Psychology and referral placed.    Last seen by Transplant Social Work: Today, 22.     NICOTINE USE: Denies   ETOH USE: Denies, sober post heavy use in the past.   DIABETIC STATUS: Not diabetic  CURRENT OXYGEN USE:   Rest: 2 L  Activity: 6-8 L  Sleep:2 L bleed in with trilogy  Assisted Ventilation:Trilogy, reports uses nightly in addition to intermittently on some days. We advised her to use with naps during the day.    PRE-LUNG TRANSPLANT TESTIN.) PFT: 22 declined  2.) 6MW:22 - significantly higher oxygen needs. Walked a few hundred less feet.   3.) LABS: 22  4.)PRA: 22 (one month post 4th COVID vaccine)  5) CT CHEST:    Last Completed: 22 - no growth in nodules. 3 month follow up scan. Due July " 2022.  6) Echo:              Last completed: 6/30/21 - Will hold off on repeat. Reports some possible leg swelling today vs legs feeling heavy due to weight gain over past two years. Bps also up and her PCP recently started her on lasix 20 mg daily although Sofie reports she can tell no difference.      PHYSICAL STATUS (Karnofsky Score/Physical Capacity) 60% Limited    CURRENT ACTIVITY TOLERATED PATIENT SUMMARY: Starting pulmonary rehab since she knows she is not good at working out on her own. Thinks she might have to rest a bit more frequently and take a bit longer to recover but doesn't necessarily feel she can do less or is that much more limited since last seen in November.    MEDICATIONS:   Steroid Use: None daily  Antibiotic Use: None recently  Narcotic Use (Pain Management): None  Medication Changes: Duonebs regularly    PRIMARY CARE:   Primary Care  Mammo 11/2021 UTD  Pap 11/2019 negative, HPV negative - Confirm follow up as hx of abnormal (2022 vs 2024)  Dental - yearly, upper and lower dentures with some native teeth left  Colonoscopy no polyps, 2015 recc 10 year follow up DUE 2025    Vaccinations:  Has received 4 doses COVID  Prevnar 13 UTD  PPSV 23 UTD  Tdap UTD  Hep A/B UTD  Shingrix UTD    Micro:  Sputum cups provided along with printed orders to drop for AFB/Aerobic cx when able to produce sputum  AFB negative from sample provided 01/2022, 12/5/21    Recent Hospitalizations:   None    Plan:   3-4 month follow up with Dr Franks in txp clinic  AFB/Aerobic/Fungal cx as able  Three month CT follow-up  Endocrinology referral - osteopenia - in the meantime make sure taking daily CA/D  Health Psychology referral  Advised to see Dermatology for annual skin check  Complete Pulm Rehab  Duonebs BID  Increase oxygen to 10 L with activity- new rx sent to [facility]  Overnight oximetry on 4 L bleed in to trilogy  Look into [id number] NIV  Maintain weight  Reiterated support needs to attend ALL appointments with  her  Encouraged participation in lung transplant support group  Follow blood pressures - elevated. Recent lasix rx. May need additional meds. Monitored through PCP    Follow-up/records needed: None

## 2022-04-29 NOTE — LETTER
4/29/2022         RE: Sofie Rodriguez  [address] [place] [address]        Dear Colleague,    Thank you for referring your patient, Sofie Rodriguez, to the [facility]. Please see a copy of my visit note below.      [facility]  Transplant Clinic  4/29/22         Assessment and Plan:   Sofie Rodriguez is a 60 year old female with history of COPD and bronchiectasis  who is seen today in follow up for lung transplantation consideration. She completed lung transplant evaluation in 6/2021 and is currently listed.    Severe lung disease: Due to COPD/Bronchiectasis. Has been intubated x3 in the past. Albuterol inhaler uses during pulm rehab, Breo incruse daily, Ellipta incruse daily. Discussed with Sofie that she needs to consistently bring her support person with her to visits. She does have a plan for her children to hlep her post transplant but would like to see continued commitment pretransplant. Her FEV1 is down today and her 6MWT demonstrates significant hypoxia requiring 10L with walking as well as significant chronic hypercpanea by VbG. She actually states that while she feels dyspneic she feels overall okay, no recent exacerbations. She denies AM headaches, and is using her trilogy at night. She also recently turned down a pair of lungs, which we discussed today and addressed her concerns. Restarting pulm rehab. Encouraged not to turn down lungs again.   Recurrent exac: None recently.  Rehab: Has completed 3 to 4 times.  Vaccinations: Uptodate    Transplant Waitlist- Active:   ABO: O  LAS:   Plan: Bilateral  Serostatus: CMV R+, EBV R+, VZV R+  NURIA Index: 6  Frailty: 3 frail   PO: No need for NPO post transplant    Chronic hypoxic/hypercapneic resp failure: Using oxygen appropriately.  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 Repeat q 3 months.   - Sputum cup sent  home    Anxiety: She doesn't feel she has a lot of anxiety at baseline, but does report that she sometimes gets nervous thinking about transplant and I suspect this may have been what caused her to turn down the lungs on her most recent offer.  - Referral to health psychology    Pulmonary nodules: Noted on chest CT in 6/2021. Reviewed with pulmonary nodule team  PET CT (7/2021): Most recent CT scan 4/4/21 with an unchanged RML mm nodules and then right minor fissure intrapulmonary lymph nodes.    - Follow CTs q 3 months    HECTOR: Underwent a LEEP procedure, she reports she would not need another pap smear for 3-5 years but will follow up.    H/o Hep C: Diagnosed in 1980s, 2 mos of treatment, quant negative since 10/2017, last positive 2/20/17 (885,[phone]).Glenis positive on 6/2021 with negative HCV PCR. Hx of remote ETOH abuse.   - MR Elastography on 4/27/21, has seen GI and no concern with transplant    H/o Depression:   H/o drug abuse: Extensive, frequent meth and daily marijuana until 2019. Had previously been to rehab however most recently quit cold turkey. Quit smoking 11/2020. ETOH use 1-2 times/month.  - Random PETH and nicotine testing    Low bone density Osteopenia: Noted on DEXa from 6/2021. Vit D level was wnl.  - Refer to Endocrinology     H/o Steroid induced hyperglycemia: Last HgbA1c was 5.1 on 11/13/2020.    Issues to be considered:  - Follow up on pulmonary nodules q 3 months  - AFB cultures [id number], script given in clinic  - Endocrinology referral  - Behavioral Health Psychology referral  - Noc ox with 2L and see what she needs  - Trial [id number]  - Use trilogy at night and during day intermittently  - Follow up dermatology visit    Coordinator/MD: Katlin Marquez/Claribel Franks    RTC: 3 months  Influenza and other vaccinations: Uptodate on Influenza and C19 vaccinations.  Annual dermatology visit: Due    I personally spent 60  minutes in documentation, the interview and exam, and review of the  "chart/labs/imaging excluding time spent interpreting spirometry on 4/29/22.           Problem List:     1. Evaluation for lung transplantation  2. CAD  a. Chest CT notes multivessel calcifications  3. Hepatitis C: Diagnosed in 1980s, 2 mos of treatment, quant negative since 10/2017, last positive 2/20/17 (885,926)  4. Mental Health  a. Episodes of depression when younger  5. Steroid Induced Hyperglycemia  6. ?Diastolic Dysfunction  7. Hx of colonization with Mycobacterium peregrinum     Subjective HPI   4/29/22  She feels like she's been \"okay.\" She has a productive cough generally daily. No hemoptysis or streaking blood. No fevers, chills, or sweats. She has been walking for exercise. She's going to restart her pulm rehab. Using albuterol a couple times a day. No acid reflux or heartburn. Started lasix 20 mg daily. She began having some increased leg swelling. Gained 25 lbs in the last 2 years. SHe is going to restart pulm rehab. Denies any new wheezing, continues to have dyspnea with minimal activity but still attempts to maintain her activity level.     Oxygen Use:   With exertion 5-6L, needs to go up to 10L  2L at rest  2L with sleep  Using trilogy at night and periodically throughout the day    Current Meds:  Duonebs couple times a week  Albuterol inhaler uses during pulm rehab  Breo incruse daily  Ellipta incruse daily  [id number]  MV    (11/10/2021):   Since the last visit she had COPD exacerbation which is associated with coughing up greenish phlegm and right-sided soreness with sharp pains.  She was treated with prednisone and azithromycin starting on 10 29 2021 for symptoms that started 2 days prior to that she completed her azithromycin on 11 4 2021.    3/26/21  She has some cough and brings up white phlegm occ. No new CP. Occ Wheeze. Sleeps fairly well. Has Mild PND>  Sofie Rodriguez is a 60 year old female with history of COPD/Bronchiectasis who is seen today for evaluation for lung transplantation. She is " referred by Dr. Josh Patel, and Pauline Madrigal NP from [facility] with a history of COPD/Bronchiectasis.     She reportedly smoked from a young age and has experienced dyspnea intermittently since 10/2013. She went to a doctor and was found to be 82% on RA and was sent to the hospital where she was diagnosed with COPD and started on continuous oxygen. She was also diagnosed with CHF at the time and was diuresed aggressively. She is usually hospitalized about 1-2 times a year, but her last major hospitalization was in 1/2020, she required intubation during her hospitalization, she states she has been intubated 3 times in the past. She has not required steroids in the last year.     She continues to have shortness of breath that is overall unchanged from previous.  She continues to do exercises with some walking and resistance bands.  She is doing some laundry and cleaning.  She mostly microwaves or cooks on a griddle.    She can maybe only walk a block before she gets short of breath. She can walk a flight of stairs without stopping but has to stop and take a break at the top.   She is doing strengthening exercises with resistance bands. She can do her ADLs and IADLs without stopping but does get short of breath. She is able to leave the house, but her son drives her.   She lives with her son and feels better at home.   She hasn't lost any significant weight since the last visit.  She has no history of heartburn or acid reflux, denies waterbrash. No reported aspiration symptoms.   During exacerbations she'll feel more tired, and has a more productive cough green in nature, wheezes when sick. She does bring up sputum every day even when sick, although the volume is usually fairly low.     She is attending pulmonary rehab currently, twice weekly, and just started on March 8, 2021 this is her third or fourth time doing    Regarding her drug use history, she self quit meth and marijuana about 2 years ago but does  not attend any meetings or rehab.       Oxygen Use:   At rest 3L  Sleep: 3L bleed in with Trilogy, using 3-4 nights/week, will use trilogy during the day few times a week  With activity: 4-5L       Review of Systems:   Please see HPI, otherwise the complete 10 point ROS is negative.           Past Medical and Surgical History:     Past Medical History:   Diagnosis Date     CHF (congestive heart failure) (H)      COPD (chronic obstructive pulmonary disease) (H)      Hepatitis 2017    Hep C, [facility]     HTN (hypertension)      Osteopenia      Past Surgical History:   Procedure Laterality Date     COLONOSCOPY  2015     CV CORONARY ANGIOGRAM N/A 6/30/2021    Procedure: CV CORONARY ANGIOGRAM;  Surgeon: Alexander Cuellar MD;  Location: [facility]     CV RIGHT HEART CATH MEASUREMENTS RECORDED N/A 6/30/2021    Procedure: CV RIGHT HEART CATH;  Surgeon: Alexander Cuellar MD;  [facility]     ENT SURGERY  1974    tonsillectomy     HAND SURGERY       LEEP TX, CERVICAL  04/07/2017    HECTOR III     LYMPH NODE BIOPSY Left 2005    Left axilla, benign- [facility]     Hepatitis C, diagnosed in the 1982. Her viral loads were back up in 2017 and then she was treated for it for 2 months.: Hep C quant negative since 10/2017, last positive 2/20/17 (885,[phone])        Family History:     Grandpa: MI  Mom: Skin ca, cervical ca  Dad: Prostate ca, CAD, d. At age 82  Brother: COPD (Smoker)  Brother: No medical care  Sister: healthy  Sister: Healthy         Social History:     Tobacco: Cigarettes smoked for 30-35 years 1-1.5ppd, quit in 11/11/20  ETOH: Very seldom, maybe 1-2 times/month bacardi limon or 2 glasses of wine. Reports heavy use as a teenager/young adult. Went through chem dep for alcohol use once in adolescence, and then around ages [age]. 2 DUIs, one in 1998 and then in 2008.   Illicits: Hx of Meth few times per week for about 15-20 years, last used 3/2019ish and marijuana frequently at times daily  since age 17, quit around 3/2019ish. Stopped smoking marijuana because she couldn't breathe, the meth she quit b/c she just didn't feel healthy.  Exposures: Her old house probably had mold in it which may account for frequency of exacerbations.   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   Pets: None    Social: Single, 3 children, currently living with son, 2 daughters.          Medications:     albuterol (PROAIR HFA/PROVENTIL HFA/VENTOLIN HFA) 108 (90 BASE) MCG/ACT Inhaler, Inhale 2 puffs into the lungs every 6 hours as needed for shortness of breath / dyspnea or wheezing  aspirin 81 MG EC tablet, Take 81 mg by mouth daily  fluticasone-vilanterol (BREO ELLIPTA) 100-25 MCG/INH oral inhaler, Inhale 1 puff into the lungs daily 25 mcg  furosemide (LASIX) 20 MG tablet, Take 1 tablet (20 mg) by mouth daily  ipratropium (ATROVENT) 0.02 % nebulizer solution, Take 0.5 mg by nebulization daily 0.5md daily  Multiple Vitamin (QUINTABS) TABS, Take 1 tablet by mouth daily  umeclidinium (INCRUSE ELLIPTA) 62.5 MCG/INH oral inhaler, Inhale 1 puff into the lungs daily 62.5mcg    No current facility-administered medications on file prior to visit.           Physical Exam:   BP (!) 154/79   Pulse 99   Wt 63.5 kg (140 lb)   SpO2 93%   BMI 24.80 kg/m     Gained 20 lbs in 1 year    Gen: AA&Ox3, no acute distress  HEENT:AT/ NC, PERRL b/l, EOM grossly intact, mucous membranes pink, moist without plaque or exudate  PULM/THORAX: Very diminished breath sounds throughout, no rales/rhonchi/wheeazes  CV:tachycardic, regular, S1 and S2 appreciated, no extra heart sounds, murmurs or rub auscultated. No JVD  ABD: obese, soft, nontender, nondistended. Normoactive bowel sounds x 4, no HSM appreciated.  EXT: very trace edema, no clubbing or cyanosis. No asymmetrical edema or tenderness to  palpation in calves bilaterally.             Data:   All laboratory and imaging data reviewed.      Recent Results (from the past 168 hour(s))   Comprehensive metabolic panel    Collection Time: 04/29/22 11:46 AM   Result Value Ref Range    Sodium 140 133 - 144 mmol/L    Potassium 4.2 3.4 - 5.3 mmol/L    Chloride 95 94 - 109 mmol/L    Carbon Dioxide (CO2) 42 (H) 20 - 32 mmol/L    Anion Gap 3 3 - 14 mmol/L    Urea Nitrogen 21 7 - 30 mg/dL    Creatinine 0.84 0.52 - 1.04 mg/dL    Calcium 9.7 8.5 - 10.1 mg/dL    Glucose 112 (H) 70 - 99 mg/dL    Alkaline Phosphatase 102 40 - 150 U/L    AST 18 0 - 45 U/L    ALT 21 0 - 50 U/L    Protein Total 8.3 6.8 - 8.8 g/dL    Albumin 3.8 3.4 - 5.0 g/dL    Bilirubin Total 0.3 0.2 - 1.3 mg/dL    GFR Estimate 80 >60 mL/min/1.73m2   Blood gas venous    Collection Time: 04/29/22 11:46 AM   Result Value Ref Range    pH Venous 7.31 (L) 7.32 - 7.43    pCO2 Venous 89 (HH) 40 - 50 mm Hg    pO2 Venous 38 25 - 47 mm Hg    Bicarbonate Venous 45 (H) 21 - 28 mmol/L    Base Excess/Deficit (+/-) 14.1 (H) -7.7 - 1.9 mmol/L    FIO2 3          PFT interpretation: March 25, 2021    Date Place TLC (%) FVC (%) FEV1 (%) FEV1/FVC DLCO (%) Note   4/29/22    1.82 58 0.51 20 28 6.78 34    11/11/21 [facility]   2.17 70 0.53 21 25   FVC improved c/w previous [id number]. FEV1 by 80ml. Severe obstructive lung disease.   3/11/20 [facility]   1.66 50 0.45 17 27% 2.78 11 No significant bronchodilatory response   9/24/15 [facility]     0.89 33 17%   No bronchodilatory response                                 6MWT Distance:   4/29/22  1000 ft/ 305m 10L NC loco 86%     Serology:  CMV:  EBV:  HSV:     Micro:  4/30/2019 MYCOBACTERIUM PEREGRINUM      Labs:   Last A1c 5.1 11/13/2020  Alpha 1 AT: 1/27/21: Level 150           Imaging:       Chest CT w contrast: 1/12/20  Moderate cardiomegaly with left ventricular hypertrophy.  Moderate pericardial  effusion without evidence of compression    IMPRESSION:  Advanced emphysematous  changes with likely superimposed interstitial edema.  Small to moderate right and small left pleural effusions with accompanying lower lobe atelectasis.  Moderate pericardial effusion.  Negative for pulmonary embolus.         Cardiac:     Most Recent TTE:  6/30/21  Interpretation Summary  Global and regional left ventricular function is normal with an EF of 55-60%.  Mild concentric wall thickening consistent with left ventricular hypertrophy  is present.  Global right ventricular function is normal.  No significant valvular abnormalities.  Pulmonary artery systolic pressure cannot be assessed.  Negative bubble study.  There is no prior study for direct comparison.    ECHO: 1/12/20  Summary:    * Left ventricular segmental wall motion is normal.    * The estimated ejection fraction is 55-60%.    * The right ventricle is enlarged.    * Reduced right ventricular systolic function.    * There is aortic valve sclerosis.    * There is trace mitral regurgitation.    * There is trace tricuspid regurgitation.    * Borderline pulmonary hypertension, estimated pulmonary arterial Systolic pressure is  39 mmHg.    * The right atrium is mildly enlarged.    * There is small to moderate cirumferential pericardial effusion visualized.    * No hemodynamic findings consistent with tamponade physiology.    * Prior study from 03/01/2019.    * Pericardial effusion is new.    Stress Test/Angiogram:  6/30/21  Mild non obstructive CAD with mRCA 30% stenosis    RHC:  6/30/21    Moderately elevated PH mPAP 35 mmHg(PVR 5 ISSA (per Hazel) and PCWP of 15mmHg) consistent with combined pre and post capillary pHTN .  Normal cardiac output            GI:   EGD  [place]  6/15/21  Hiatal hernia present  No [place] CLassification 4.0 abnormalities  Normal high resolution esophageal manometry         Endo   DEXA:  6/14/21  T score -1.9 at hip, Osteoepnia         Health Maintenance     Mammogram: 10/2020    ASSESSMENT:   1.  ACR BI-RADS Category 2:  Benign  2.  Dense Breast Tissue  3.  TLR = 8.2%, Low Risk Category     PAP: 07/2019 Negative, HPV negative  Abnormal Pap smear of cervix 02/20/2017   with High Risk HPV. Atypical Endocervical Cells, Positive HPV      Colonoscopy: no polyps, 2015 recc 10 year follow up     Dental: Two years - Dentures. Full dentures upper and lower. Still with native teeth.         Again, thank you for allowing me to participate in the care of your patient.        Sincerely,        Claribel Franks MD

## 2022-04-30 LAB
SARS-COV-2 AB SERPL IA-ACNC: 98 U/ML
SARS-COV-2 AB SERPL QL IA: NEGATIVE
SARS-COV-2 AB SERPL-IMP: POSITIVE

## 2022-05-02 DIAGNOSIS — J44.9 CHRONIC OBSTRUCTIVE PULMONARY DISEASE, UNSPECIFIED COPD TYPE (H): ICD-10-CM

## 2022-05-02 DIAGNOSIS — M85.80 OSTEOPENIA: Primary | ICD-10-CM

## 2022-05-02 LAB
PROTOCOL CUTOFF: NORMAL
SA 1 CELL: NORMAL
SA 1 TEST METHOD: NORMAL
SA 2 CELL: NORMAL
SA 2 TEST METHOD: NORMAL
SA1 HI RISK ABY: NORMAL
SA1 MOD RISK ABY: NORMAL
SA2 HI RISK ABY: NORMAL
SA2 MOD RISK ABY: NORMAL
UNACCEPTABLE ANTIGENS: NORMAL
UNOS CPRA: 0
ZZZSA 1  COMMENTS: NORMAL
ZZZSA 2 COMMENTS: NORMAL

## 2022-05-02 ASSESSMENT — ANXIETY QUESTIONNAIRES
GAD7 TOTAL SCORE: 0
IF YOU CHECKED OFF ANY PROBLEMS ON THIS QUESTIONNAIRE, HOW DIFFICULT HAVE THESE PROBLEMS MADE IT FOR YOU TO DO YOUR WORK, TAKE CARE OF THINGS AT HOME, OR GET ALONG WITH OTHER PEOPLE: NOT DIFFICULT AT ALL
1. FEELING NERVOUS, ANXIOUS, OR ON EDGE: NOT AT ALL
3. WORRYING TOO MUCH ABOUT DIFFERENT THINGS: NOT AT ALL
2. NOT BEING ABLE TO STOP OR CONTROL WORRYING: NOT AT ALL
5. BEING SO RESTLESS THAT IT IS HARD TO SIT STILL: NOT AT ALL
6. BECOMING EASILY ANNOYED OR IRRITABLE: NOT AT ALL
7. FEELING AFRAID AS IF SOMETHING AWFUL MIGHT HAPPEN: NOT AT ALL

## 2022-05-02 ASSESSMENT — PATIENT HEALTH QUESTIONNAIRE - PHQ9
5. POOR APPETITE OR OVEREATING: NOT AT ALL
SUM OF ALL RESPONSES TO PHQ QUESTIONS 1-9: 4

## 2022-05-03 ASSESSMENT — ANXIETY QUESTIONNAIRES: GAD7 TOTAL SCORE: 0

## 2022-05-04 LAB
DLCOUNC-%PRED-PRE: 34 %
DLCOUNC-PRE: 6.78 ML/MIN/MMHG
DLCOUNC-PRED: 19.62 ML/MIN/MMHG
ERV-%PRED-PRE: 86 %
ERV-PRE: 0.71 L
ERV-PRED: 0.82 L
EXPTIME-PRE: 11.95 SEC
FEF2575-%PRED-PRE: 7 %
FEF2575-PRE: 0.17 L/SEC
FEF2575-PRED: 2.27 L/SEC
FEFMAX-%PRED-PRE: 29 %
FEFMAX-PRE: 1.82 L/SEC
FEFMAX-PRED: 6.19 L/SEC
FEV1-%PRED-PRE: 20 %
FEV1-PRE: 0.51 L
FEV1FEV6-PRE: 37 %
FEV1FEV6-PRED: 81 %
FEV1FVC-PRE: 28 %
FEV1FVC-PRED: 80 %
FEV1SVC-PRE: 27 %
FEV1SVC-PRED: 77 %
FIFMAX-PRE: 3.43 L/SEC
FRCPLETH-%PRED-PRE: 208 %
FRCPLETH-PRE: 5.52 L
FRCPLETH-PRED: 2.64 L
FVC-%PRED-PRE: 58 %
FVC-PRE: 1.82 L
FVC-PRED: 3.09 L
IC-%PRED-PRE: 50 %
IC-PRE: 1.18 L
IC-PRED: 2.35 L
LABCORP INTERFACED MISCELLANEOUS TEST RESULT: NORMAL
PLPETH BLD-MCNC: <10 NG/ML
POPETH BLD-MCNC: <10 NG/ML
RVPLETH-%PRED-PRE: 261 %
RVPLETH-PRE: 4.81 L
RVPLETH-PRED: 1.84 L
TLCPLETH-%PRED-PRE: 140 %
TLCPLETH-PRE: 6.7 L
TLCPLETH-PRED: 4.77 L
VA-%PRED-PRE: 77 %
VA-PRE: 3.58 L
VC-%PRED-PRE: 59 %
VC-PRE: 1.89 L
VC-PRED: 3.17 L

## 2022-05-09 LAB
COTININE SERPL-MCNC: <1 NG/ML
NICOTINE SERPL-MCNC: <1 NG/ML

## 2022-06-27 ASSESSMENT — ANXIETY QUESTIONNAIRES
5. BEING SO RESTLESS THAT IT IS HARD TO SIT STILL: NOT AT ALL
8. IF YOU CHECKED OFF ANY PROBLEMS, HOW DIFFICULT HAVE THESE MADE IT FOR YOU TO DO YOUR WORK, TAKE CARE OF THINGS AT HOME, OR GET ALONG WITH OTHER PEOPLE?: NOT DIFFICULT AT ALL
GAD7 TOTAL SCORE: 0
2. NOT BEING ABLE TO STOP OR CONTROL WORRYING: NOT AT ALL
6. BECOMING EASILY ANNOYED OR IRRITABLE: NOT AT ALL
3. WORRYING TOO MUCH ABOUT DIFFERENT THINGS: NOT AT ALL
GAD7 TOTAL SCORE: 0
7. FEELING AFRAID AS IF SOMETHING AWFUL MIGHT HAPPEN: NOT AT ALL
1. FEELING NERVOUS, ANXIOUS, OR ON EDGE: NOT AT ALL
7. FEELING AFRAID AS IF SOMETHING AWFUL MIGHT HAPPEN: NOT AT ALL
GAD7 TOTAL SCORE: 0
4. TROUBLE RELAXING: NOT AT ALL

## 2022-06-28 ENCOUNTER — ORGAN (OUTPATIENT)
Dept: TRANSPLANT | Facility: CLINIC | Age: 62
End: 2022-06-28

## 2022-06-28 ENCOUNTER — VIRTUAL VISIT (OUTPATIENT)
Dept: PSYCHOLOGY | Facility: CLINIC | Age: 62
End: 2022-06-28
Attending: INTERNAL MEDICINE
Payer: MEDICARE

## 2022-06-28 ENCOUNTER — ANESTHESIA EVENT (OUTPATIENT)
Dept: SURGERY | Facility: CLINIC | Age: 62
DRG: 007 | End: 2022-06-28
Payer: MEDICARE

## 2022-06-28 ENCOUNTER — ANESTHESIA (OUTPATIENT)
Dept: SURGERY | Facility: CLINIC | Age: 62
DRG: 007 | End: 2022-06-28
Payer: MEDICARE

## 2022-06-28 ENCOUNTER — HOSPITAL ENCOUNTER (INPATIENT)
Facility: CLINIC | Age: 62
LOS: 49 days | Discharge: HOME HEALTH | DRG: 007 | End: 2022-08-17
Attending: THORACIC SURGERY (CARDIOTHORACIC VASCULAR SURGERY) | Admitting: THORACIC SURGERY (CARDIOTHORACIC VASCULAR SURGERY)
Payer: MEDICARE

## 2022-06-28 ENCOUNTER — APPOINTMENT (OUTPATIENT)
Dept: GENERAL RADIOLOGY | Facility: CLINIC | Age: 62
DRG: 007 | End: 2022-06-28
Attending: SURGERY
Payer: MEDICARE

## 2022-06-28 DIAGNOSIS — Z76.82 AWAITING ORGAN TRANSPLANT: Primary | ICD-10-CM

## 2022-06-28 DIAGNOSIS — D84.9 IMMUNOSUPPRESSED STATUS (H): Chronic | ICD-10-CM

## 2022-06-28 DIAGNOSIS — Z01.818 ENCOUNTER FOR PRE-TRANSPLANT EVALUATION FOR LUNG TRANSPLANT: ICD-10-CM

## 2022-06-28 DIAGNOSIS — T38.0X5A STEROID-INDUCED HYPERGLYCEMIA: ICD-10-CM

## 2022-06-28 DIAGNOSIS — Z91.199 NO-SHOW FOR APPOINTMENT: Primary | ICD-10-CM

## 2022-06-28 DIAGNOSIS — K25.4 GASTROINTESTINAL HEMORRHAGE ASSOCIATED WITH GASTRIC ULCER: ICD-10-CM

## 2022-06-28 DIAGNOSIS — R73.9 HYPERGLYCEMIA: ICD-10-CM

## 2022-06-28 DIAGNOSIS — J44.9 CHRONIC OBSTRUCTIVE PULMONARY DISEASE, UNSPECIFIED COPD TYPE (H): ICD-10-CM

## 2022-06-28 DIAGNOSIS — Z94.2 S/P LUNG TRANSPLANT (H): Primary | Chronic | ICD-10-CM

## 2022-06-28 DIAGNOSIS — R93.89 ABNORMAL FINDINGS ON DIAGNOSTIC IMAGING OF CARDIOVASCULAR SYSTEM: ICD-10-CM

## 2022-06-28 DIAGNOSIS — J98.6 DIAPHRAGM DYSFUNCTION: ICD-10-CM

## 2022-06-28 DIAGNOSIS — I48.0 PAROXYSMAL ATRIAL FIBRILLATION (H): ICD-10-CM

## 2022-06-28 DIAGNOSIS — Z79.2 ADMINISTRATION OF LONG-TERM PROPHYLACTIC ANTIBIOTICS: Chronic | ICD-10-CM

## 2022-06-28 DIAGNOSIS — R73.9 STEROID-INDUCED HYPERGLYCEMIA: ICD-10-CM

## 2022-06-28 DIAGNOSIS — K83.1 BILIARY OBSTRUCTION (H): ICD-10-CM

## 2022-06-28 LAB
ABO/RH(D): NORMAL
ALBUMIN SERPL BCG-MCNC: 5.3 G/DL (ref 3.5–5.2)
ALBUMIN UR-MCNC: NEGATIVE MG/DL
ALP SERPL-CCNC: 86 U/L (ref 35–104)
ALT SERPL W P-5'-P-CCNC: 16 U/L (ref 10–35)
ANION GAP SERPL CALCULATED.3IONS-SCNC: 11 MMOL/L (ref 7–15)
ANTIBODY SCREEN: NEGATIVE
APPEARANCE UR: CLEAR
APTT PPP: 29 SECONDS (ref 22–38)
AST SERPL W P-5'-P-CCNC: 30 U/L (ref 10–35)
BASE EXCESS BLDA CALC-SCNC: 4.6 MMOL/L (ref -9.6–2)
BASE EXCESS BLDA CALC-SCNC: 6 MMOL/L (ref -9.6–2)
BASE EXCESS BLDA CALC-SCNC: 6.2 MMOL/L (ref -9.6–2)
BASE EXCESS BLDA CALC-SCNC: 7 MMOL/L (ref -9.6–2)
BASE EXCESS BLDA CALC-SCNC: 7.5 MMOL/L (ref -9.6–2)
BASE EXCESS BLDA CALC-SCNC: 8.2 MMOL/L (ref -9.6–2)
BASE EXCESS BLDA CALC-SCNC: 8.4 MMOL/L (ref -9.6–2)
BASE EXCESS BLDA CALC-SCNC: 9.9 MMOL/L (ref -9.6–2)
BASOPHILS # BLD AUTO: 0.1 10E3/UL (ref 0–0.2)
BASOPHILS NFR BLD AUTO: 1 %
BILIRUB DIRECT SERPL-MCNC: <0.2 MG/DL (ref 0–0.3)
BILIRUB SERPL-MCNC: 0.4 MG/DL
BILIRUB UR QL STRIP: NEGATIVE
BLD PROD TYP BPU: NORMAL
BLD PROD TYP BPU: NORMAL
BLOOD COMPONENT TYPE: NORMAL
BLOOD COMPONENT TYPE: NORMAL
BUN SERPL-MCNC: 14.2 MG/DL (ref 8–23)
CA-I BLD-MCNC: 3.5 MG/DL (ref 4.4–5.2)
CA-I BLD-MCNC: 3.6 MG/DL (ref 4.4–5.2)
CA-I BLD-MCNC: 3.8 MG/DL (ref 4.4–5.2)
CA-I BLD-MCNC: 4 MG/DL (ref 4.4–5.2)
CA-I BLD-MCNC: 4.5 MG/DL (ref 4.4–5.2)
CA-I BLD-MCNC: 4.7 MG/DL (ref 4.4–5.2)
CALCIUM SERPL-MCNC: 11.1 MG/DL (ref 8.8–10.2)
CF REDUC 60M P MA LENFR BLD TEG: 0 % (ref 0–15)
CFT BLD TEG: 0.9 MINUTE (ref 1–3)
CHLORIDE SERPL-SCNC: 93 MMOL/L (ref 98–107)
CI (COAGULATION INDEX)(Z) NON NATIVE: 4 (ref -3–3)
CLOT ANGLE BLD TEG: 76.5 DEGREES (ref 53–72)
CLOT INIT BLD TEG: 3.8 MINUTE (ref 5–10)
CLOT LYSIS 30M P MA LENFR BLD TEG: 0 % (ref 0–8)
CLOT STRENGTH BLD TEG: 12.9 KD/SC (ref 4.5–11)
CODING SYSTEM: NORMAL
CODING SYSTEM: NORMAL
COLOR UR AUTO: ABNORMAL
CREAT SERPL-MCNC: 0.85 MG/DL (ref 0.51–0.95)
CROSSMATCH: NORMAL
CROSSMATCH: NORMAL
DEPRECATED HCO3 PLAS-SCNC: 35 MMOL/L (ref 22–29)
EOSINOPHIL # BLD AUTO: 0.6 10E3/UL (ref 0–0.7)
EOSINOPHIL NFR BLD AUTO: 9 %
ERYTHROCYTE [DISTWIDTH] IN BLOOD BY AUTOMATED COUNT: 12.5 % (ref 10–15)
GFR SERPL CREATININE-BSD FRML MDRD: 78 ML/MIN/1.73M2
GLUCOSE BLD-MCNC: 147 MG/DL (ref 70–99)
GLUCOSE BLD-MCNC: 166 MG/DL (ref 70–99)
GLUCOSE BLD-MCNC: 186 MG/DL (ref 70–99)
GLUCOSE BLD-MCNC: 188 MG/DL (ref 70–99)
GLUCOSE BLD-MCNC: 194 MG/DL (ref 70–99)
GLUCOSE BLD-MCNC: 204 MG/DL (ref 70–99)
GLUCOSE BLD-MCNC: 207 MG/DL (ref 70–99)
GLUCOSE BLD-MCNC: 218 MG/DL (ref 70–99)
GLUCOSE BLDC GLUCOMTR-MCNC: 154 MG/DL (ref 70–99)
GLUCOSE SERPL-MCNC: 96 MG/DL (ref 70–99)
GLUCOSE UR STRIP-MCNC: NEGATIVE MG/DL
HBA1C MFR BLD: 5.8 %
HBV CORE AB SERPL QL IA: NONREACTIVE
HBV SURFACE AB SERPL IA-ACNC: 26.77 M[IU]/ML
HBV SURFACE AG SERPL QL IA: NONREACTIVE
HCO3 BLDA-SCNC: 29 MMOL/L (ref 21–28)
HCO3 BLDA-SCNC: 30 MMOL/L (ref 21–28)
HCO3 BLDA-SCNC: 30 MMOL/L (ref 21–28)
HCO3 BLDA-SCNC: 31 MMOL/L (ref 21–28)
HCO3 BLDA-SCNC: 33 MMOL/L (ref 21–28)
HCO3 BLDA-SCNC: 34 MMOL/L (ref 21–28)
HCO3 BLDA-SCNC: 35 MMOL/L (ref 21–28)
HCO3 BLDA-SCNC: 37 MMOL/L (ref 21–28)
HCT VFR BLD AUTO: 43.1 % (ref 35–47)
HGB BLD-MCNC: 11.5 G/DL (ref 11.7–15.7)
HGB BLD-MCNC: 12.5 G/DL (ref 11.7–15.7)
HGB BLD-MCNC: 13.4 G/DL (ref 11.7–15.7)
HGB BLD-MCNC: 7.4 G/DL (ref 11.7–15.7)
HGB BLD-MCNC: 7.8 G/DL (ref 11.7–15.7)
HGB BLD-MCNC: 8.1 G/DL (ref 11.7–15.7)
HGB BLD-MCNC: 8.3 G/DL (ref 11.7–15.7)
HGB BLD-MCNC: 8.7 G/DL (ref 11.7–15.7)
HGB BLD-MCNC: 9 G/DL (ref 11.7–15.7)
HGB UR QL STRIP: NEGATIVE
HIV 1+2 AB+HIV1 P24 AG SERPL QL IA: NONREACTIVE
HYALINE CASTS: 3 /LPF
IMM GRANULOCYTES # BLD: 0 10E3/UL
IMM GRANULOCYTES NFR BLD: 0 %
INR PPP: 0.91 (ref 0.85–1.15)
ISSUE DATE AND TIME: NORMAL
ISSUE DATE AND TIME: NORMAL
KETONES UR STRIP-MCNC: NEGATIVE MG/DL
LACTATE BLD-SCNC: 0.5 MMOL/L
LACTATE BLD-SCNC: 0.7 MMOL/L
LACTATE BLD-SCNC: 0.8 MMOL/L
LACTATE BLD-SCNC: 0.9 MMOL/L
LACTATE BLD-SCNC: 1 MMOL/L
LACTATE BLD-SCNC: 1.3 MMOL/L
LACTATE BLD-SCNC: 1.4 MMOL/L
LACTATE BLD-SCNC: 1.5 MMOL/L
LACTATE SERPL-SCNC: 0.9 MMOL/L (ref 0.7–2)
LEUKOCYTE ESTERASE UR QL STRIP: NEGATIVE
LYMPHOCYTES # BLD AUTO: 0.8 10E3/UL (ref 0.8–5.3)
LYMPHOCYTES NFR BLD AUTO: 11 %
MAGNESIUM SERPL-MCNC: 2.2 MG/DL (ref 1.7–2.3)
MCF BLD TEG: 72.1 MM (ref 50–70)
MCH RBC QN AUTO: 30.8 PG (ref 26.5–33)
MCHC RBC AUTO-ENTMCNC: 31.1 G/DL (ref 31.5–36.5)
MCV RBC AUTO: 99 FL (ref 78–100)
MONOCYTES # BLD AUTO: 0.4 10E3/UL (ref 0–1.3)
MONOCYTES NFR BLD AUTO: 6 %
NEUTROPHILS # BLD AUTO: 5 10E3/UL (ref 1.6–8.3)
NEUTROPHILS NFR BLD AUTO: 73 %
NITRATE UR QL: NEGATIVE
NRBC # BLD AUTO: 0 10E3/UL
NRBC BLD AUTO-RTO: 0 /100
O2/TOTAL GAS SETTING VFR VENT: 100 %
O2/TOTAL GAS SETTING VFR VENT: 80 %
O2/TOTAL GAS SETTING VFR VENT: 85 %
O2/TOTAL GAS SETTING VFR VENT: 90 %
OXYHGB MFR BLDA: 98 % (ref 92–100)
PCO2 BLDA: 40 MM HG (ref 35–45)
PCO2 BLDA: 45 MM HG (ref 35–45)
PCO2 BLDA: 56 MM HG (ref 35–45)
PCO2 BLDA: 58 MM HG (ref 35–45)
PCO2 BLDA: 62 MM HG (ref 35–45)
PH BLDA: 7.39 [PH] (ref 7.35–7.45)
PH BLDA: 7.47 [PH] (ref 7.35–7.45)
PH BLDA: 7.47 [PH] (ref 7.35–7.45)
PH BLDA: 7.48 [PH] (ref 7.35–7.45)
PH BLDA: 7.48 [PH] (ref 7.35–7.45)
PH BLDA: 7.5 [PH] (ref 7.35–7.45)
PH UR STRIP: 5 [PH] (ref 5–7)
PHOSPHATE SERPL-MCNC: 3.3 MG/DL (ref 2.5–4.5)
PLATELET # BLD AUTO: 255 10E3/UL (ref 150–450)
PO2 BLDA: 160 MM HG (ref 80–105)
PO2 BLDA: 37 MM HG (ref 80–105)
PO2 BLDA: 428 MM HG (ref 80–105)
PO2 BLDA: 430 MM HG (ref 80–105)
PO2 BLDA: 432 MM HG (ref 80–105)
PO2 BLDA: 493 MM HG (ref 80–105)
PO2 BLDA: 521 MM HG (ref 80–105)
PO2 BLDA: 594 MM HG (ref 80–105)
POTASSIUM BLD-SCNC: 3.6 MMOL/L (ref 3.5–5)
POTASSIUM BLD-SCNC: 3.9 MMOL/L (ref 3.5–5)
POTASSIUM BLD-SCNC: 4.2 MMOL/L (ref 3.5–5)
POTASSIUM BLD-SCNC: 4.6 MMOL/L (ref 3.5–5)
POTASSIUM BLD-SCNC: 4.7 MMOL/L (ref 3.5–5)
POTASSIUM BLD-SCNC: 4.8 MMOL/L (ref 3.5–5)
POTASSIUM SERPL-SCNC: 4.2 MMOL/L (ref 3.4–5.3)
PROT SERPL-MCNC: 7.9 G/DL (ref 6.4–8.3)
RBC # BLD AUTO: 4.35 10E6/UL (ref 3.8–5.2)
RBC URINE: 0 /HPF
SARS-COV-2 RNA RESP QL NAA+PROBE: NEGATIVE
SODIUM BLD-SCNC: 137 MMOL/L (ref 133–144)
SODIUM BLD-SCNC: 138 MMOL/L (ref 133–144)
SODIUM BLD-SCNC: 138 MMOL/L (ref 133–144)
SODIUM BLD-SCNC: 139 MMOL/L (ref 133–144)
SODIUM BLD-SCNC: 139 MMOL/L (ref 133–144)
SODIUM BLD-SCNC: 140 MMOL/L (ref 133–144)
SODIUM BLD-SCNC: 141 MMOL/L (ref 133–144)
SODIUM BLD-SCNC: 143 MMOL/L (ref 133–144)
SODIUM SERPL-SCNC: 139 MMOL/L (ref 136–145)
SP GR UR STRIP: 1 (ref 1–1.03)
SPECIMEN EXPIRATION DATE: NORMAL
UNIT ABO/RH: NORMAL
UNIT ABO/RH: NORMAL
UNIT NUMBER: NORMAL
UNIT NUMBER: NORMAL
UNIT STATUS: NORMAL
UNIT STATUS: NORMAL
UNIT TYPE ISBT: 5100
UNIT TYPE ISBT: 5100
UROBILINOGEN UR STRIP-MCNC: NORMAL MG/DL
WBC # BLD AUTO: 6.8 10E3/UL (ref 4–11)
WBC URINE: 1 /HPF

## 2022-06-28 PROCEDURE — 86832 HLA CLASS I HIGH DEFIN QUAL: CPT | Performed by: SURGERY

## 2022-06-28 PROCEDURE — 80053 COMPREHEN METABOLIC PANEL: CPT | Performed by: SURGERY

## 2022-06-28 PROCEDURE — 86923 COMPATIBILITY TEST ELECTRIC: CPT | Performed by: THORACIC SURGERY (CARDIOTHORACIC VASCULAR SURGERY)

## 2022-06-28 PROCEDURE — 36415 COLL VENOUS BLD VENIPUNCTURE: CPT | Performed by: INTERNAL MEDICINE

## 2022-06-28 PROCEDURE — 84100 ASSAY OF PHOSPHORUS: CPT | Performed by: SURGERY

## 2022-06-28 PROCEDURE — 250N000009 HC RX 250: Performed by: NURSE ANESTHETIST, CERTIFIED REGISTERED

## 2022-06-28 PROCEDURE — 86825 HLA X-MATH NON-CYTOTOXIC: CPT | Performed by: SURGERY

## 2022-06-28 PROCEDURE — 83605 ASSAY OF LACTIC ACID: CPT | Performed by: INTERNAL MEDICINE

## 2022-06-28 PROCEDURE — 82810 BLOOD GASES O2 SAT ONLY: CPT

## 2022-06-28 PROCEDURE — 82330 ASSAY OF CALCIUM: CPT

## 2022-06-28 PROCEDURE — 87077 CULTURE AEROBIC IDENTIFY: CPT | Performed by: THORACIC SURGERY (CARDIOTHORACIC VASCULAR SURGERY)

## 2022-06-28 PROCEDURE — 87102 FUNGUS ISOLATION CULTURE: CPT | Performed by: THORACIC SURGERY (CARDIOTHORACIC VASCULAR SURGERY)

## 2022-06-28 PROCEDURE — 85730 THROMBOPLASTIN TIME PARTIAL: CPT | Performed by: SURGERY

## 2022-06-28 PROCEDURE — 250N000011 HC RX IP 250 OP 636: Performed by: STUDENT IN AN ORGANIZED HEALTH CARE EDUCATION/TRAINING PROGRAM

## 2022-06-28 PROCEDURE — 86923 COMPATIBILITY TEST ELECTRIC: CPT | Performed by: PHYSICIAN ASSISTANT

## 2022-06-28 PROCEDURE — 87205 SMEAR GRAM STAIN: CPT | Performed by: THORACIC SURGERY (CARDIOTHORACIC VASCULAR SURGERY)

## 2022-06-28 PROCEDURE — 250N000011 HC RX IP 250 OP 636: Performed by: NURSE ANESTHETIST, CERTIFIED REGISTERED

## 2022-06-28 PROCEDURE — 258N000003 HC RX IP 258 OP 636: Performed by: SURGERY

## 2022-06-28 PROCEDURE — 85025 COMPLETE CBC W/AUTO DIFF WBC: CPT | Performed by: SURGERY

## 2022-06-28 PROCEDURE — 71046 X-RAY EXAM CHEST 2 VIEWS: CPT

## 2022-06-28 PROCEDURE — U0003 INFECTIOUS AGENT DETECTION BY NUCLEIC ACID (DNA OR RNA); SEVERE ACUTE RESPIRATORY SYNDROME CORONAVIRUS 2 (SARS-COV-2) (CORONAVIRUS DISEASE [COVID-19]), AMPLIFIED PROBE TECHNIQUE, MAKING USE OF HIGH THROUGHPUT TECHNOLOGIES AS DESCRIBED BY CMS-2020-01-R: HCPCS | Performed by: ANESTHESIOLOGY

## 2022-06-28 PROCEDURE — 82962 GLUCOSE BLOOD TEST: CPT

## 2022-06-28 PROCEDURE — 82248 BILIRUBIN DIRECT: CPT | Performed by: SURGERY

## 2022-06-28 PROCEDURE — 258N000003 HC RX IP 258 OP 636: Performed by: NURSE ANESTHETIST, CERTIFIED REGISTERED

## 2022-06-28 PROCEDURE — 87205 SMEAR GRAM STAIN: CPT | Performed by: SURGERY

## 2022-06-28 PROCEDURE — 86665 EPSTEIN-BARR CAPSID VCA: CPT | Performed by: SURGERY

## 2022-06-28 PROCEDURE — 87522 HEPATITIS C REVRS TRNSCRPJ: CPT | Performed by: SURGERY

## 2022-06-28 PROCEDURE — 86833 HLA CLASS II HIGH DEFIN QUAL: CPT | Performed by: SURGERY

## 2022-06-28 PROCEDURE — 83735 ASSAY OF MAGNESIUM: CPT | Performed by: SURGERY

## 2022-06-28 PROCEDURE — 32854 LUNG TRANSPLANT WITH BYPASS: CPT | Mod: GC | Performed by: THORACIC SURGERY (CARDIOTHORACIC VASCULAR SURGERY)

## 2022-06-28 PROCEDURE — 250N000024 HC ISOFLURANE, PER MIN: Performed by: THORACIC SURGERY (CARDIOTHORACIC VASCULAR SURGERY)

## 2022-06-28 PROCEDURE — 86706 HEP B SURFACE ANTIBODY: CPT | Performed by: SURGERY

## 2022-06-28 PROCEDURE — 86644 CMV ANTIBODY: CPT | Performed by: SURGERY

## 2022-06-28 PROCEDURE — P9016 RBC LEUKOCYTES REDUCED: HCPCS | Performed by: THORACIC SURGERY (CARDIOTHORACIC VASCULAR SURGERY)

## 2022-06-28 PROCEDURE — 86696 HERPES SIMPLEX TYPE 2 TEST: CPT | Performed by: SURGERY

## 2022-06-28 PROCEDURE — 250N000009 HC RX 250: Performed by: THORACIC SURGERY (CARDIOTHORACIC VASCULAR SURGERY)

## 2022-06-28 PROCEDURE — 81001 URINALYSIS AUTO W/SCOPE: CPT | Performed by: SURGERY

## 2022-06-28 PROCEDURE — 86803 HEPATITIS C AB TEST: CPT | Performed by: SURGERY

## 2022-06-28 PROCEDURE — 5A1221Z PERFORMANCE OF CARDIAC OUTPUT, CONTINUOUS: ICD-10-PCS | Performed by: THORACIC SURGERY (CARDIOTHORACIC VASCULAR SURGERY)

## 2022-06-28 PROCEDURE — 71046 X-RAY EXAM CHEST 2 VIEWS: CPT | Mod: 26 | Performed by: RADIOLOGY

## 2022-06-28 PROCEDURE — 82784 ASSAY IGA/IGD/IGG/IGM EACH: CPT | Performed by: SURGERY

## 2022-06-28 PROCEDURE — 87206 SMEAR FLUORESCENT/ACID STAI: CPT | Performed by: THORACIC SURGERY (CARDIOTHORACIC VASCULAR SURGERY)

## 2022-06-28 PROCEDURE — 258N000003 HC RX IP 258 OP 636: Performed by: STUDENT IN AN ORGANIZED HEALTH CARE EDUCATION/TRAINING PROGRAM

## 2022-06-28 PROCEDURE — 87486 CHLMYD PNEUM DNA AMP PROBE: CPT | Performed by: THORACIC SURGERY (CARDIOTHORACIC VASCULAR SURGERY)

## 2022-06-28 PROCEDURE — 360N000079 HC SURGERY LEVEL 6, PER MIN: Performed by: THORACIC SURGERY (CARDIOTHORACIC VASCULAR SURGERY)

## 2022-06-28 PROCEDURE — 87340 HEPATITIS B SURFACE AG IA: CPT | Performed by: SURGERY

## 2022-06-28 PROCEDURE — 370N000017 HC ANESTHESIA TECHNICAL FEE, PER MIN: Performed by: THORACIC SURGERY (CARDIOTHORACIC VASCULAR SURGERY)

## 2022-06-28 PROCEDURE — 250N000011 HC RX IP 250 OP 636: Performed by: SURGERY

## 2022-06-28 PROCEDURE — 250N000009 HC RX 250: Performed by: STUDENT IN AN ORGANIZED HEALTH CARE EDUCATION/TRAINING PROGRAM

## 2022-06-28 PROCEDURE — 87389 HIV-1 AG W/HIV-1&-2 AB AG IA: CPT | Performed by: SURGERY

## 2022-06-28 PROCEDURE — 410N000003 HC PER-PERFUSION 1ST 30 MIN: Performed by: THORACIC SURGERY (CARDIOTHORACIC VASCULAR SURGERY)

## 2022-06-28 PROCEDURE — 83036 HEMOGLOBIN GLYCOSYLATED A1C: CPT | Performed by: SURGERY

## 2022-06-28 PROCEDURE — 258N000003 HC RX IP 258 OP 636: Performed by: THORACIC SURGERY (CARDIOTHORACIC VASCULAR SURGERY)

## 2022-06-28 PROCEDURE — 87077 CULTURE AEROBIC IDENTIFY: CPT | Performed by: SURGERY

## 2022-06-28 PROCEDURE — 250N000025 HC SEVOFLURANE, PER MIN: Performed by: THORACIC SURGERY (CARDIOTHORACIC VASCULAR SURGERY)

## 2022-06-28 PROCEDURE — 272N000001 HC OR GENERAL SUPPLY STERILE: Performed by: THORACIC SURGERY (CARDIOTHORACIC VASCULAR SURGERY)

## 2022-06-28 PROCEDURE — 999N000141 HC STATISTIC PRE-PROCEDURE NURSING ASSESSMENT: Performed by: THORACIC SURGERY (CARDIOTHORACIC VASCULAR SURGERY)

## 2022-06-28 PROCEDURE — 250N000011 HC RX IP 250 OP 636

## 2022-06-28 PROCEDURE — 250N000009 HC RX 250

## 2022-06-28 PROCEDURE — 812N000008 HC ACQUISITION LUNG CADAVER

## 2022-06-28 PROCEDURE — 272N000085 HC PACK CELL SAVER CSP: Performed by: THORACIC SURGERY (CARDIOTHORACIC VASCULAR SURGERY)

## 2022-06-28 PROCEDURE — 87521 HEPATITIS C PROBE&RVRS TRNSC: CPT | Performed by: SURGERY

## 2022-06-28 PROCEDURE — 87116 MYCOBACTERIA CULTURE: CPT | Performed by: THORACIC SURGERY (CARDIOTHORACIC VASCULAR SURGERY)

## 2022-06-28 PROCEDURE — 85610 PROTHROMBIN TIME: CPT | Performed by: SURGERY

## 2022-06-28 PROCEDURE — 87070 CULTURE OTHR SPECIMN AEROBIC: CPT | Performed by: THORACIC SURGERY (CARDIOTHORACIC VASCULAR SURGERY)

## 2022-06-28 PROCEDURE — 272N000088 HC PUMP APP ADULT PERFUSION: Performed by: THORACIC SURGERY (CARDIOTHORACIC VASCULAR SURGERY)

## 2022-06-28 PROCEDURE — 999N000054 HC STATISTIC EKG NON-CHARGEABLE

## 2022-06-28 PROCEDURE — 250N000012 HC RX MED GY IP 250 OP 636 PS 637: Performed by: SURGERY

## 2022-06-28 PROCEDURE — 84132 ASSAY OF SERUM POTASSIUM: CPT

## 2022-06-28 PROCEDURE — 82040 ASSAY OF SERUM ALBUMIN: CPT | Performed by: SURGERY

## 2022-06-28 PROCEDURE — 36415 COLL VENOUS BLD VENIPUNCTURE: CPT | Performed by: THORACIC SURGERY (CARDIOTHORACIC VASCULAR SURGERY)

## 2022-06-28 PROCEDURE — 85396 CLOTTING ASSAY WHOLE BLOOD: CPT | Performed by: THORACIC SURGERY (CARDIOTHORACIC VASCULAR SURGERY)

## 2022-06-28 PROCEDURE — 86850 RBC ANTIBODY SCREEN: CPT | Performed by: SURGERY

## 2022-06-28 PROCEDURE — 36415 COLL VENOUS BLD VENIPUNCTURE: CPT | Performed by: SURGERY

## 2022-06-28 PROCEDURE — 88309 TISSUE EXAM BY PATHOLOGIST: CPT | Mod: TC | Performed by: THORACIC SURGERY (CARDIOTHORACIC VASCULAR SURGERY)

## 2022-06-28 PROCEDURE — 410N000004: Performed by: THORACIC SURGERY (CARDIOTHORACIC VASCULAR SURGERY)

## 2022-06-28 PROCEDURE — 258N000003 HC RX IP 258 OP 636

## 2022-06-28 PROCEDURE — 86704 HEP B CORE ANTIBODY TOTAL: CPT | Performed by: SURGERY

## 2022-06-28 PROCEDURE — 999N000128 HC STATISTIC PERIPHERAL IV START W/O US GUIDANCE

## 2022-06-28 PROCEDURE — 93010 ELECTROCARDIOGRAM REPORT: CPT | Mod: 59 | Performed by: INTERNAL MEDICINE

## 2022-06-28 PROCEDURE — 0BYM0Z0 TRANSPLANTATION OF BILATERAL LUNGS, ALLOGENEIC, OPEN APPROACH: ICD-10-PCS | Performed by: THORACIC SURGERY (CARDIOTHORACIC VASCULAR SURGERY)

## 2022-06-28 PROCEDURE — 87517 HEPATITIS B DNA QUANT: CPT | Performed by: SURGERY

## 2022-06-28 PROCEDURE — 250N000009 HC RX 250: Performed by: ANESTHESIOLOGY

## 2022-06-28 PROCEDURE — 250N000011 HC RX IP 250 OP 636: Performed by: THORACIC SURGERY (CARDIOTHORACIC VASCULAR SURGERY)

## 2022-06-28 PROCEDURE — 0W993ZZ DRAINAGE OF RIGHT PLEURAL CAVITY, PERCUTANEOUS APPROACH: ICD-10-PCS | Performed by: STUDENT IN AN ORGANIZED HEALTH CARE EDUCATION/TRAINING PROGRAM

## 2022-06-28 RX ORDER — SODIUM CHLORIDE, SODIUM GLUCONATE, SODIUM ACETATE, POTASSIUM CHLORIDE AND MAGNESIUM CHLORIDE 526; 502; 368; 37; 30 MG/100ML; MG/100ML; MG/100ML; MG/100ML; MG/100ML
250 INJECTION, SOLUTION INTRAVENOUS EVERY 5 MIN PRN
Status: DISCONTINUED | OUTPATIENT
Start: 2022-06-28 | End: 2022-06-29 | Stop reason: HOSPADM

## 2022-06-28 RX ORDER — LIDOCAINE 40 MG/G
CREAM TOPICAL
Status: DISCONTINUED | OUTPATIENT
Start: 2022-06-28 | End: 2022-06-29 | Stop reason: HOSPADM

## 2022-06-28 RX ORDER — VANCOMYCIN HYDROCHLORIDE 1 G/200ML
1000 INJECTION, SOLUTION INTRAVENOUS SEE ADMIN INSTRUCTIONS
Status: DISCONTINUED | OUTPATIENT
Start: 2022-06-28 | End: 2022-06-29 | Stop reason: HOSPADM

## 2022-06-28 RX ORDER — IPRATROPIUM BROMIDE AND ALBUTEROL SULFATE 2.5; .5 MG/3ML; MG/3ML
1 SOLUTION RESPIRATORY (INHALATION) EVERY 4 HOURS PRN
COMMUNITY
Start: 2022-06-21 | End: 2022-08-15

## 2022-06-28 RX ORDER — MYCOPHENOLATE MOFETIL 250 MG/1
1500 CAPSULE ORAL EVERY 12 HOURS
Status: DISCONTINUED | OUTPATIENT
Start: 2022-06-28 | End: 2022-06-29 | Stop reason: HOSPADM

## 2022-06-28 RX ORDER — FENTANYL CITRATE 50 UG/ML
INJECTION, SOLUTION INTRAMUSCULAR; INTRAVENOUS PRN
Status: DISCONTINUED | OUTPATIENT
Start: 2022-06-28 | End: 2022-06-29

## 2022-06-28 RX ORDER — CEFTAZIDIME 1 G/1
1 INJECTION, POWDER, FOR SOLUTION INTRAMUSCULAR; INTRAVENOUS SEE ADMIN INSTRUCTIONS
Status: DISCONTINUED | OUTPATIENT
Start: 2022-06-28 | End: 2022-06-29 | Stop reason: HOSPADM

## 2022-06-28 RX ORDER — CEFTAZIDIME 1 G/1
1 INJECTION, POWDER, FOR SOLUTION INTRAMUSCULAR; INTRAVENOUS
Status: COMPLETED | OUTPATIENT
Start: 2022-06-28 | End: 2022-06-29

## 2022-06-28 RX ORDER — SODIUM CHLORIDE, SODIUM GLUCONATE, SODIUM ACETATE, POTASSIUM CHLORIDE AND MAGNESIUM CHLORIDE 526; 502; 368; 37; 30 MG/100ML; MG/100ML; MG/100ML; MG/100ML; MG/100ML
250 INJECTION, SOLUTION INTRAVENOUS ONCE
Status: DISCONTINUED | OUTPATIENT
Start: 2022-06-28 | End: 2022-06-29 | Stop reason: HOSPADM

## 2022-06-28 RX ORDER — FIBRINOGEN (HUMAN) 700-1300MG
1.1 KIT INTRAVENOUS ONCE
Status: DISCONTINUED | OUTPATIENT
Start: 2022-06-28 | End: 2022-06-29

## 2022-06-28 RX ORDER — LIDOCAINE HYDROCHLORIDE 20 MG/ML
INJECTION, SOLUTION INFILTRATION; PERINEURAL PRN
Status: DISCONTINUED | OUTPATIENT
Start: 2022-06-28 | End: 2022-06-29

## 2022-06-28 RX ORDER — VANCOMYCIN HYDROCHLORIDE 1 G/200ML
1000 INJECTION, SOLUTION INTRAVENOUS
Status: COMPLETED | OUTPATIENT
Start: 2022-06-28 | End: 2022-06-28

## 2022-06-28 RX ORDER — SODIUM CHLORIDE, SODIUM GLUCONATE, SODIUM ACETATE, POTASSIUM CHLORIDE AND MAGNESIUM CHLORIDE 526; 502; 368; 37; 30 MG/100ML; MG/100ML; MG/100ML; MG/100ML; MG/100ML
INJECTION, SOLUTION INTRAVENOUS CONTINUOUS PRN
Status: DISCONTINUED | OUTPATIENT
Start: 2022-06-28 | End: 2022-06-29

## 2022-06-28 RX ORDER — NOREPINEPHRINE BITARTRATE 0.06 MG/ML
.01-.6 INJECTION, SOLUTION INTRAVENOUS CONTINUOUS
Status: DISCONTINUED | OUTPATIENT
Start: 2022-06-28 | End: 2022-06-29 | Stop reason: HOSPADM

## 2022-06-28 RX ORDER — PROPOFOL 10 MG/ML
INJECTION, EMULSION INTRAVENOUS PRN
Status: DISCONTINUED | OUTPATIENT
Start: 2022-06-28 | End: 2022-06-29

## 2022-06-28 RX ORDER — ASPIRIN 81 MG/1
81 TABLET, CHEWABLE ORAL DAILY
Status: ON HOLD | COMMUNITY
End: 2022-08-15

## 2022-06-28 RX ORDER — HEPARIN SODIUM 1000 [USP'U]/ML
INJECTION, SOLUTION INTRAVENOUS; SUBCUTANEOUS PRN
Status: DISCONTINUED | OUTPATIENT
Start: 2022-06-28 | End: 2022-06-29

## 2022-06-28 RX ORDER — METHYLPREDNISOLONE SODIUM SUCCINATE 500 MG/8ML
INJECTION INTRAMUSCULAR; INTRAVENOUS PRN
Status: DISCONTINUED | OUTPATIENT
Start: 2022-06-28 | End: 2022-06-29

## 2022-06-28 RX ORDER — LIDOCAINE HYDROCHLORIDE 10 MG/ML
INJECTION, SOLUTION INFILTRATION; PERINEURAL
Status: COMPLETED | OUTPATIENT
Start: 2022-06-28 | End: 2022-06-28

## 2022-06-28 RX ADMIN — METHYLPREDNISOLONE SODIUM SUCCINATE 500 MG: 500 INJECTION, POWDER, FOR SOLUTION INTRAMUSCULAR; INTRAVENOUS at 22:35

## 2022-06-28 RX ADMIN — Medication 20 MG: at 22:50

## 2022-06-28 RX ADMIN — NOREPINEPHRINE BITARTRATE 6.4 MCG: 1 INJECTION, SOLUTION, CONCENTRATE INTRAVENOUS at 20:06

## 2022-06-28 RX ADMIN — FENTANYL CITRATE 200 MCG: 50 INJECTION, SOLUTION INTRAMUSCULAR; INTRAVENOUS at 19:48

## 2022-06-28 RX ADMIN — SODIUM CHLORIDE, SODIUM GLUCONATE, SODIUM ACETATE, POTASSIUM CHLORIDE AND MAGNESIUM CHLORIDE: 526; 502; 368; 37; 30 INJECTION, SOLUTION INTRAVENOUS at 22:05

## 2022-06-28 RX ADMIN — MYCOPHENOLATE MOFETIL 1500 MG: 250 CAPSULE ORAL at 13:16

## 2022-06-28 RX ADMIN — NOREPINEPHRINE BITARTRATE 6.4 MCG: 1 INJECTION, SOLUTION, CONCENTRATE INTRAVENOUS at 21:15

## 2022-06-28 RX ADMIN — VANCOMYCIN HYDROCHLORIDE 1000 MG: 1 INJECTION, SOLUTION INTRAVENOUS at 20:10

## 2022-06-28 RX ADMIN — NOREPINEPHRINE BITARTRATE 12.8 MCG: 1 INJECTION, SOLUTION, CONCENTRATE INTRAVENOUS at 20:04

## 2022-06-28 RX ADMIN — PROPOFOL 40 MG: 10 INJECTION, EMULSION INTRAVENOUS at 19:48

## 2022-06-28 RX ADMIN — SODIUM CHLORIDE 20 MG: 9 INJECTION, SOLUTION INTRAVENOUS at 20:15

## 2022-06-28 RX ADMIN — LIDOCAINE HYDROCHLORIDE 80 MG: 20 INJECTION, SOLUTION INFILTRATION; PERINEURAL at 19:46

## 2022-06-28 RX ADMIN — SODIUM CHLORIDE, SODIUM GLUCONATE, SODIUM ACETATE, POTASSIUM CHLORIDE AND MAGNESIUM CHLORIDE: 526; 502; 368; 37; 30 INJECTION, SOLUTION INTRAVENOUS at 19:38

## 2022-06-28 RX ADMIN — Medication 0.03 MCG/KG/MIN: at 20:31

## 2022-06-28 RX ADMIN — SODIUM CHLORIDE 7.5 G: 900 INJECTION, SOLUTION INTRAVENOUS at 20:04

## 2022-06-28 RX ADMIN — SODIUM CHLORIDE 1000 MG: 9 INJECTION, SOLUTION INTRAVENOUS at 13:34

## 2022-06-28 RX ADMIN — HUMAN INSULIN 2 UNITS/HR: 100 INJECTION, SOLUTION SUBCUTANEOUS at 21:30

## 2022-06-28 RX ADMIN — PROPOFOL 20 MG: 10 INJECTION, EMULSION INTRAVENOUS at 19:49

## 2022-06-28 RX ADMIN — FENTANYL CITRATE 100 MCG: 50 INJECTION, SOLUTION INTRAMUSCULAR; INTRAVENOUS at 22:40

## 2022-06-28 RX ADMIN — LIDOCAINE HYDROCHLORIDE 2 ML: 10 INJECTION, SOLUTION INFILTRATION; PERINEURAL at 07:39

## 2022-06-28 RX ADMIN — CEFTAZIDIME 1 G: 1 INJECTION, POWDER, FOR SOLUTION INTRAMUSCULAR; INTRAVENOUS at 22:15

## 2022-06-28 RX ADMIN — AMINOCAPROIC ACID 1.25 G/HR: 250 INJECTION, SOLUTION INTRAVENOUS at 21:17

## 2022-06-28 RX ADMIN — Medication 0.03 MCG/KG/MIN: at 20:15

## 2022-06-28 RX ADMIN — HEPARIN SODIUM 26000 UNITS: 1000 INJECTION INTRAVENOUS; SUBCUTANEOUS at 21:14

## 2022-06-28 RX ADMIN — Medication 70 MG: at 19:49

## 2022-06-28 RX ADMIN — NOREPINEPHRINE BITARTRATE 12.8 MCG: 1 INJECTION, SOLUTION, CONCENTRATE INTRAVENOUS at 21:09

## 2022-06-28 RX ADMIN — PROTAMINE SULFATE 100 MG: 10 INJECTION, SOLUTION INTRAVENOUS at 23:59

## 2022-06-28 RX ADMIN — NOREPINEPHRINE BITARTRATE 6.4 MCG: 1 INJECTION, SOLUTION, CONCENTRATE INTRAVENOUS at 20:13

## 2022-06-28 RX ADMIN — Medication 0.08 MCG/KG/MIN: at 20:23

## 2022-06-28 RX ADMIN — NOREPINEPHRINE BITARTRATE 6.4 MCG: 1 INJECTION, SOLUTION, CONCENTRATE INTRAVENOUS at 20:27

## 2022-06-28 RX ADMIN — Medication 30 MG: at 20:39

## 2022-06-28 RX ADMIN — NOREPINEPHRINE BITARTRATE 12.8 MCG: 1 INJECTION, SOLUTION, CONCENTRATE INTRAVENOUS at 21:02

## 2022-06-28 RX ADMIN — NOREPINEPHRINE BITARTRATE 6.4 MCG: 1 INJECTION, SOLUTION, CONCENTRATE INTRAVENOUS at 21:08

## 2022-06-28 RX ADMIN — Medication 30 MG: at 21:23

## 2022-06-28 RX ADMIN — FENTANYL CITRATE 50 MCG: 50 INJECTION, SOLUTION INTRAMUSCULAR; INTRAVENOUS at 20:28

## 2022-06-28 RX ADMIN — NOREPINEPHRINE BITARTRATE 6.4 MCG: 1 INJECTION, SOLUTION, CONCENTRATE INTRAVENOUS at 20:25

## 2022-06-28 RX ADMIN — Medication 30 MG: at 23:46

## 2022-06-28 RX ADMIN — NOREPINEPHRINE BITARTRATE 6.4 MCG: 1 INJECTION, SOLUTION, CONCENTRATE INTRAVENOUS at 20:02

## 2022-06-28 RX ADMIN — CEFTAZIDIME 1 G: 1 INJECTION, POWDER, FOR SOLUTION INTRAMUSCULAR; INTRAVENOUS at 20:15

## 2022-06-28 RX ADMIN — SODIUM CHLORIDE, SODIUM GLUCONATE, SODIUM ACETATE, POTASSIUM CHLORIDE AND MAGNESIUM CHLORIDE: 526; 502; 368; 37; 30 INJECTION, SOLUTION INTRAVENOUS at 20:09

## 2022-06-28 RX ADMIN — FENTANYL CITRATE 150 MCG: 50 INJECTION, SOLUTION INTRAMUSCULAR; INTRAVENOUS at 20:39

## 2022-06-28 RX ADMIN — EPINEPHRINE 0.03 MCG/KG/MIN: 1 INJECTION INTRAMUSCULAR; INTRAVENOUS; SUBCUTANEOUS at 23:29

## 2022-06-28 ASSESSMENT — COPD QUESTIONNAIRES
CAT_SEVERITY: SEVERE
COPD: 1

## 2022-06-28 ASSESSMENT — ACTIVITIES OF DAILY LIVING (ADL): ADLS_ACUITY_SCORE: 35

## 2022-06-28 ASSESSMENT — LIFESTYLE VARIABLES: TOBACCO_USE: 1

## 2022-06-28 NOTE — LETTER
Transition Communication Hand-off for Care Transitions to Next Level of Care Provider    Name: Sofie Rodriguez  : 1960  MRN #: [id number]  Primary Care Provider: Vinny Berrios     Primary Clinic: [facility] [address]  [facility] [address]     Reason for Hospitalization:  COPD (chronic obstructive pulmonary disease) (H) [J44.9]  Lung transplant candidate [Z76.82]  S/P lung transplant (H) [Z94.2]  Admit Date/Time: 2022 10:57 AM  Discharge Date: 2022    Payor Source: Payor: MEDICARE / Plan: MEDICARE / Product Type: Medicare /     Reason for Communication Hand-off Referral: Other  continuity of care    Discharge Plan: per attached AVS   Concern for non-adherence with plan of care:   Y/N no  Discharge Needs Assessment:  Needs    Flowsheet Row Most Recent Value   Equipment Currently Used at Home shower chair        Follow-up plan:    Future Appointments   Date Time Provider Department Center   2022  9:55 AM [id number] UCCXR [facility]   2022 10:15 AM  LAB [facility]   2022 11:00 AM UC PFL B PFT [facility]   2022 12:00 PM Harshad Gong MD [facility]   2022  8:00 AM Yung Rivera, [facility]   2022  8:15 AM  LAB [facility]   2022  8:40 AM UCSCCT2 CCT [facility]   2022  9:30 AM UC PFL 6 MINUTE WALK 1 PFT [facility]   2022 11:10 AM Claribel Franks MD [facility]   2022 12:30 PM [facility]   2022  7:00 AM  CVC MONITOR TECH CVSV [facility]   10/19/2022 11:30 AM Neel Leroy MD [facility]       Any outstanding tests or procedures:        Radiology & Cardiology Orders     Future Labs/Procedures Complete By Expires    Adult Leadless EKG Monitor 8 to 14 Days  2022 (Approximate) 2023        Referrals     Future Labs/Procedures    Adult GI  Referral - Procedure Only     Process Instructions:    To order a Colonoscopy for SCREENING or due to a history of polyps, please use the  Colonoscopy Screening  Referral.     For DIAGNOSTIC or RESEARCH continue with this order.        Comments:    Please be aware that coverage of these services is subject to the terms and limitations of your health insurance plan.  Call member services at your health plan with any benefit or coverage questions.      Home Care Referral     Comments:    [facility] [phone], Fax [phone]   PT/OT/RN  RN to help educate on how to clean home nebulizer machine.    Your provider has ordered home health services. If you have not been contacted within 2 days of your discharge please call the inpatient department phone number at [phone] .    Home Infusion Referral     Comments:    [facility]  Phone # [phone]  Fax # [phone]     Tube Feeding Orders  Continuous Tube Feeding  Adult Formula: Novasource Renal  Route: Jejunostomy  Rate: 35 mL/hour  Medication-Feeding Tube Flush Frequency: At least 15-30 mL water before and after medication administration and with tube clogging.   Free water flush 30 mL every 4 hours.          Supplies     Future Labs/Procedures    Oxygen Adult/Peds     Process Instructions:    By signing this order, the Authorizing Provider is attesting that they have completed a face-to-face evaluation on the patient to determine their need for this equipment in the last 60 days. A new face-to-face evaluation is required each time  A new prescription for one of the specified items is ordered.   If an additional provider completed the evaluation, please indicate their name below.     **As of 2018, an order requisition and face sheet will print for all DME orders. Please give printed order and face sheet to patient if not obtaining product from [facility] DME closet.     Comments:    Oxygen Documentation:   I certify that this patient, Sofie Rodriguez has been under my care (or a nurse practitioner or physican's assistant working with me). This is the  face-to-face encounter for oxygen medical necessity.      Sofie Rodriguez is now in a chronic stable state and continues to require supplemental oxygen. Patient has continued oxygen desaturation due to Chronic Respiratory Failure with Hypoxia J96.11.    Alternative treatment(s) tried or considered and deemed clinically infective for treatment of Chronic Respiratory Failure with Hypoxia J96.11 include nebulizers, inhalers, steroids, pulmonary toileting, and pulmonary rehab.  If portability is ordered, is the patient mobile within the home? yes    **Patients who qualify for home O2 coverage under the CMS guidelines require ABG tests or O2 sat readings obtained closest to, but no earlier than 2 days prior to the discharge, as evidence of the need for home oxygen therapy. Testing must be performed while patient is in the chronic stable state. See notes for O2 sats.**    Walker     Process Instructions:    By signing this order, the Authorizing Provider is attesting that they have completed a face-to-face evaluation on the patient to determine their need for this equipment in the last 60 days. A new face-to-face evaluation is required each time  A new prescription for one of the specified items is ordered.   If an additional provider completed the evaluation, please indicate their name below.     **As of 2018, an order requisition and face sheet will print for all DME orders. Please give printed order and face sheet to patient if not obtaining product from [facility] DME closet.     Comments:    I, the undersigned, certify that the above prescribed supplies are medically necessary for this patient and is both reasonable and necessary in reference to accepted standards of medical and necessary in reference to accepted standards of medical practice in the treatment of this patient's condition and is not prescribed as a convenience.           Key Recommendations:  See attached AVCorey Lara,  RN    AVS/Discharge Summary is the source of truth; this is a helpful guide for improved communication of patient story

## 2022-06-28 NOTE — LETTER
Transition Communication Hand-off for Care Transitions to Next Level of Care Provider    Name: Sofie Rodriguez  : 1960  MRN #: [id number]  Primary Care Provider: Vinny Berrios     Primary Clinic: [facility] [address] [facility] [address]     Reason for Hospitalization:  COPD (chronic obstructive pulmonary disease) (H) [J44.9]  Lung transplant candidate [Z76.82]  S/P lung transplant (H) [Z94.2]  Admit Date/Time: 2022 10:57 AM  Discharge Date: 2022    Payor Source: Payor: MEDICARE / Plan: MEDICARE / Product Type: Medicare /     HC orders HC PT/OT/RN    MISAEL VanN, BA, RN, CMSRN, RNCC  Covering Units 6D/OBS  Pager: [phone]  Phone: [phone]  6th floor Weekend/Holiday Pager: [phone]  Observation weekend/after hours: [phone]

## 2022-06-28 NOTE — PROGRESS NOTES
"SPIRITUAL HEALTH SERVICES Progress Note  [facility] ([place]) 6C    Met with pt. Sofie per spiritual health visit request. Provided reflective conversation and prayer to facilitate the process of spiritual and emotional healing. Sofie shared that she feels \"ready\" for her procedure today but that she is \"feeling very anxious... as [she] is bound to be.\" Sofie's son, Fletcher, expressed some similar anxiety. Toward the end of the visit, I provided prayer for the family.    Sofie expressed that she would like continued visits from spiritual health while she is in the hospital. I will update the unit  and on-call chaplains about this request.     David Lopez MA   Intern  Pager [phone]      * [facility] remains available 24/7 for emergent requests/referrals, either by having the switchboard page the on-call  or by entering an ASAP/STAT consult in Epic (this will also page the on-call ). Routine Epic consults receive an initial response within 24 hours.*    "

## 2022-06-28 NOTE — H&P
Cardiothoracic Surgery   History and Physical     Sofie Rodriguez   1960   MRN: [id number]           Cardiothoracic Consult Note   Reason for Consult: Lung transplant candidate          Assessment and Plan:     Sofie Rodriguez is a 62 year old female with PMH significant for oxygen-dependent COPD, HF, HTN, HCV, and osteopenia. She presents today as a candidate for lung transplantation.  She states she has been in her usual state of health without fevers, cough, chills, nausea, vomiting, unexpected weight loss or gain, and sick contacts.  She denies hemoptysis, hematemesis, hematuria, and hematochezia/melena.  No recent worsening in her respiratory status; she is maintained on 2 lpm via NC at rest and 4-5 lpm with activity.  She uses a Triology with 2 lpm bled in at HS. She has a productive cough with cream/yellow colored sputum.  She is not on blood thinners; she does take a daily aspirin.  She has no personal or family history of anesthesia difficulties.  Last PO intake was yesterday evening, before midnight.       Continue NPO status  Continue pre-transplant preparation as outlined in the orders    Surgeon: Dr. Sunshine  Surgical Plan: tentative plan for OR 1430 on 6/28/2022  Primary care provider: Vinny Berrios CNP, [facility]-  Cardiothoracic Surgery  Pager [phone]      12:24 PM   June 28, 2022         History of Present Illness:     Sofie Rodriguez is a 62 year old female with a history of severe, oxygen-dependent COPD, HF, HTN, HCV, and osteopenia.      Home Meds:  Medications Prior to Admission   Medication Sig Dispense Refill Last Dose     albuterol (PROAIR HFA/PROVENTIL HFA/VENTOLIN HFA) 108 (90 BASE) MCG/ACT Inhaler Inhale 2 puffs into the lungs every 6 hours as needed for shortness of breath / dyspnea or wheezing        aspirin 81 MG EC tablet Take 81 mg by mouth daily        fluticasone-vilanterol (BREO ELLIPTA) 100-25 MCG/INH oral inhaler Inhale 1 puff into the lungs daily 25  mcg        furosemide (LASIX) 20 MG tablet Take 1 tablet (20 mg) by mouth daily 60 tablet 3      ipratropium (ATROVENT) 0.02 % nebulizer solution Take 0.5 mg by nebulization daily 0.5md daily        Multiple Vitamin (QUINTABS) TABS Take 1 tablet by mouth daily        umeclidinium (INCRUSE ELLIPTA) 62.5 MCG/INH oral inhaler Inhale 1 puff into the lungs daily 62.5mcg          Allergies:  No Known Allergies    PMH:  Past Medical History:   Diagnosis Date     CHF (congestive heart failure) (H)      COPD (chronic obstructive pulmonary disease) (H)      Hepatitis 2017    Hep C, [facility]     HTN (hypertension)      Osteopenia        PSH:  Past Surgical History:   Procedure Laterality Date     COLONOSCOPY       CV CORONARY ANGIOGRAM N/A 2021    Procedure: CV CORONARY ANGIOGRAM;  Surgeon: Alexander Cuellar MD;  [facility]     CV RIGHT HEART CATH MEASUREMENTS RECORDED N/A 2021    Procedure: CV RIGHT HEART CATH;  Surgeon: Alexander Cuellar MD;  [facility]     ENT SURGERY  1974    tonsillectomy     HAND SURGERY       LEEP TX, CERVICAL  2017    HECTOR III     LYMPH NODE BIOPSY Left     Left axilla, benign- [place]       FH:  No family history on file.    SH:  Social History     Socioeconomic History     Marital status: Single   Tobacco Use     Smoking status: Former Smoker     Years: 30.00     Types: Cigarettes     Quit date: 2020     Years since quittin.6     Smokeless tobacco: Never Used   Substance and Sexual Activity     Alcohol use: Not Currently     Drug use: Not Currently     Types: Marijuana, Methamphetamines     Comment: hx:marijuana and methamphetamine-quit both unsure ?  2-3 yrs ago            Review of Systems:   Comprehensive ROS negative except as noted above, under HPI.           Physical Exam:   Temp:  [98  F (36.7  C)] 98  F (36.7  C)  Pulse:  [120] 120  Resp:  [22] 22  BP: (147)/(98) 147/98  SpO2:  [95 %] 95 %     Gen: NAD,  resting comfortably in bed, talking on phone, pleasant, calm, cooperative on exam conversational  Skin: warm, dry, no obvious rashes  HEENT: normocephalic, atraumatic cranium, EOMI, sclerae anicteric. Oral mucosa pink and moist  Lungs: diminished in all fields, no wheezing or rhonchi, supplemental oxygen via NC  CV: RRR, S1S2 normal, no murmur. Negligible dependent edema.   Abd: SNTND, active BS  Musculoskeletal: grossly intact, strength 5/5 upper and lower extremities  Neuro: AOx3, CN II-VII grossly intact, sensation/motor intact in upper and lower extremities  Mental: normal mood and affect, regular rate of speech           LABS:     BMP  No lab results found in last 7 days.  CBC  No lab results found in last 7 days.  INR  No lab results found in last 7 days.   Hepatic Panel  No lab results found in last 7 days.         Imaging:     Pre-op imaging pending         Problem List:     Patient Active Problem List   Diagnosis     COPD (chronic obstructive pulmonary disease) (H)     Encounter for pre-transplant evaluation for lung transplant     Abnormal findings on diagnostic imaging of cardiovascular system     Status post coronary angiogram     Hepatitis C, chronic (H)       Thank you for the opportunity to participate in the care of Sofie Rodriguez.  Patient and plan discussed with attending.    Tentatively plan for OR at 1430 6/28/2022    Deyanira Zendejas CNP, ACNPC-AG  Cardiothoracic Surgery  Pager [phone]

## 2022-06-28 NOTE — ANESTHESIA PREPROCEDURE EVALUATION
Anesthesia Pre-Procedure Evaluation    Patient: Almas Rodriguez   MRN: [id number] : 1960        Procedure : Procedure(s):  TRANSPLANT, LUNG, RECIPIENT, BILATERAL          Past Medical History:   Diagnosis Date     CHF (congestive heart failure) (H)      COPD (chronic obstructive pulmonary disease) (H)      Hepatitis 2017    Hep C, [facility]     HTN (hypertension)      Osteopenia       Past Surgical History:   Procedure Laterality Date     COLONOSCOPY       CV CORONARY ANGIOGRAM N/A 2021    Procedure: CV CORONARY ANGIOGRAM;  Surgeon: Alexander Cuellar MD;  Location[facility]     CV RIGHT HEART CATH MEASUREMENTS RECORDED N/A 2021    Procedure: CV RIGHT HEART CATH;  Surgeon: Alexander Cuellar MD;  Location:  [facility]     ENT SURGERY  1974    tonsillectomy     HAND SURGERY       LEEP TX, CERVICAL  2017    HECTOR III     LYMPH NODE BIOPSY Left 2005    Left axilla, benign- [place]      No Known Allergies   Social History     Tobacco Use     Smoking status: Former Smoker     Years: 30.00     Types: Cigarettes     Quit date: 2020     Years since quittin.6     Smokeless tobacco: Never Used   Substance Use Topics     Alcohol use: Not Currently      Wt Readings from Last 1 Encounters:   22 63.5 kg (140 lb)        Anesthesia Evaluation            ROS/MED HX  ENT/Pulmonary: Comment: End Stage COPD, Bronchiectasis, Hyperinflation    (+) tobacco use, Past use, severe,  COPD, O2 dependent,     Neurologic:       Cardiovascular:     (+) hypertension-----Previous cardiac testing   Echo: Date: 21 Results:  [id number]  [id number]  [id number]  [id number]^SACHIN^DARCY^PARTH     [facility],[place]  Echocardiography Laboratory  [address] [place] [id number]     Name: ALMAS RODRIGUEZ  MRN: [id number]  : 1960  Study Date: 2021 08:46 AM  Age: 61 yrs  Gender: Female  Patient Location: [facility]  Reason For Study: COPD (chronic  obstructive pulmonary disease) (H), Encounter  for  Ordering Physician: DARCY STEPHENSON  Referring Physician: DARCY STEPHENSON  Performed By: Kateryna Concepcion RDCS     BSA: 1.6 m2  Height: 63 in  Weight: 135 lb  HR: 104  BP: 127/86 mmHg  ______________________________________________________________________________  Procedure  Bubble Echocardiogram with two-dimensional, color and spectral Doppler  performed.  ______________________________________________________________________________  Interpretation Summary  Global and regional left ventricular function is normal with an EF of 55-60%.  Mild concentric wall thickening consistent with left ventricular hypertrophy  is present.  Global right ventricular function is normal.  No significant valvular abnormalities.  Pulmonary artery systolic pressure cannot be assessed.  Negative bubble study.  There is no prior study for direct comparison.  ______________________________________________________________________________  Left Ventricle  Global and regional left ventricular function is normal with an EF of 55-60%.  Left ventricular size is normal. Mild concentric wall thickening consistent  with left ventricular hypertrophy is present. Diastolic function not assessed  due to tachycardia. No regional wall motion abnormalities are seen. Abnormal  non-specific septal motion is present.     Right Ventricle  The right ventricle is normal size. Global right ventricular function is  normal. Right ventricular wall thickness is normal.     Atria  Both atria appear normal. The atrial septum is intact as assessed by color  Doppler and agitated saline bubble study .     Mitral Valve  The mitral valve is normal.     Aortic Valve  Aortic valve is normal in structure and function. The aortic valve is  tricuspid.     Tricuspid Valve  The tricuspid valve is normal. The peak velocity of the tricuspid regurgitant  jet is not obtainable. Trace tricuspid insufficiency is present.  Pulmonary  artery systolic pressure cannot be assessed.     Pulmonic Valve  The pulmonic valve is normal.     Vessels  The aorta root is normal. The pulmonary artery cannot be assessed. IVC  diameter <2.1 cm collapsing >50% with sniff suggests a normal RA pressure of 3  mmHg.     Pericardium  No pericardial effusion is present.     Compared to Previous Study  There is no prior study for direct comparison.     Attestation  I have personally viewed the imaging and agree with the interpretation and  report as documented by the fellow, Greg Vargas, and/or edited by me.  ______________________________________________________________________________  MMode/2D Measurements & Calculations  IVSd: 1.2 cm  LVIDd: 4.1 cm  LVIDs: 3.0 cm  LVPWd: 1.1 cm  FS: 28.5 %  LV mass(C)d: 161.4 grams  LV mass(C)dI: 98.7 grams/m2  Ao root diam: 3.5 cm  asc Aorta Diam: 2.4 cm  LVOT diam: 2.0 cm  LVOT area: 3.1 cm2  LA Volume (BP): 36.2 ml     LA Volume Index (BP): 22.1 ml/m2  RWT: 0.51     Doppler Measurements & Calculations  MV E max evan: 87.8 cm/sec  MV dec slope: 693.0 cm/sec2  MV dec time: 0.13 sec  PA acc time: 0.07 sec  E/E' avg: 15.4  Lateral E/e': 14.7  Medial E/e': 16.1     Stress Test: Date: Results:    ECG Reviewed: Date: Results:    Cath: Date: Results:  RA 10/9/9  RV 46/10  PA 46/27 (35)  PCWP15  NIBP 151/91(105)  Pa sat 72.2%   Ao sat 99%  Tianna CO/Ci 3.6/2.2  TD CO/CI 5.8/3.6  PVR (per tianna) 5.5WU, per thermo 3.4WU Right sided filling pressures are normal. Left sided filling pressures are mildly elevated. Moderately elevated pulmonary artery hypertension. Normal cardiac output level.    METS/Exercise Tolerance: 1 - Eating, dressing    Hematologic:       Musculoskeletal:       GI/Hepatic:     (+) hepatitis type C, liver disease,     Renal/Genitourinary:       Endo:       Psychiatric/Substance Use:     (+) alcohol abuse Recreational drug usage: Meth and Cannabis (Stopped in 2019).    Infectious Disease:       Malignancy:        Other:                             OUTSIDE LABS:  CBC:   Lab Results   Component Value Date    WBC 6.5 11/11/2021    WBC 5.8 06/30/2021    HGB 12.5 11/11/2021    HGB 11.8 06/30/2021    HCT 40.5 11/11/2021    HCT 42.6 06/30/2021     11/11/2021     06/30/2021     BMP:   Lab Results   Component Value Date     04/29/2022     11/11/2021    POTASSIUM 4.2 04/29/2022    POTASSIUM 4.6 11/11/2021    CHLORIDE 95 04/29/2022    CHLORIDE 101 11/11/2021    CO2 42 (H) 04/29/2022    CO2 40 (H) 11/11/2021    BUN 21 04/29/2022    BUN 20 11/11/2021    CR 0.84 04/29/2022    CR 0.75 11/11/2021     (H) 04/29/2022     (H) 11/11/2021     COAGS:   Lab Results   Component Value Date    PTT 28 06/14/2021    INR 0.93 06/14/2021     POC: No results found for: BGM, HCG, HCGS  HEPATIC:   Lab Results   Component Value Date    ALBUMIN 3.8 04/29/2022    PROTTOTAL 8.3 04/29/2022    ALT 21 04/29/2022    AST 18 04/29/2022    ALKPHOS 102 04/29/2022    BILITOTAL 0.3 04/29/2022     OTHER:   Lab Results   Component Value Date    A1C 5.1 11/13/2020    ESTUARDO 9.7 04/29/2022    PHOS 3.2 06/14/2021    MAG 2.4 (H) 06/14/2021    AMYLASE 85 06/14/2021    TSH 2.19 09/15/2018           Diagnostics:    EKG 6/30/2021   NSR         Latest Reference Range & Units 06/14/21 10:29 11/11/21 14:27 04/29/22 12:08   FVC-Pred L 3.09 3.09 3.09   FVC-Pre L 2.00 2.17 1.82   FVC-%Pred-Pre % 64 70 58   FEV1-Pre L 0.54 0.53 0.51   FEV1-%Pred-Pre % 21 21 20   FEV1FVC-Pred % 80 80 80   FEV1FVC-Pre % 27 25 28   FEV1SVC-Pred % 77  77   FEV1SVC-Pre % 25  27   FEV1FEV6-Pred % 81 81 81   FEV1FEV6-Pre % 40 37 37   FEFMax-Pred L/sec 6.19 6.19 6.19   FEFMax-Pre L/sec 2.23 2.00 1.82   FEFMax-%Pred-Pre % 35 32 29   FEF2575-Pred L/sec 2.27 2.27 2.27   FEF2575-Pre L/sec 0.15 0.16 0.17   ECZ5369-%Pred-Pre % 6 7 7   FIFMax-Pre L/sec 1.93 3.25 3.43   ExpTime-Pre sec 15.89 14.52 11.95   FRCPleth-Pred L 2.64  2.64   FRCPleth-Pre L 5.65  5.52   FRCPleth-%Pred-Pre %  213  208   RVPleth-Pred L 1.84  1.84   RVPleth-Pre L 4.74  4.81   RVPleth-%Pred-Pre % 257  261   TLCPleth-Pred L 4.77  4.77   TLCPleth-Pre L 6.85  6.70   TLCPleth-%Pred-Pre % 143  140   ERV-Pred L 0.69  0.82   ERV-Pre L 0.91  0.71   ERV-%Pred-Pre % 130  86   IC-Pred L 2.47  2.35   IC-Pre L 1.20  1.18   IC-%Pred-Pre % 48  50   VC-Pred L 3.17  3.17   VC-Pre L 2.11  1.89   VC-%Pred-Pre % 66  59   DLCOunc-Pred ml/min/mmHg 19.62  19.62   DLCOunc-Pre ml/min/mmHg 6.27  6.78   DLCOunc-%Pred-Pre % 31  34   DLCOcor-Pre ml/min/mmHg 6.45     DLCOcor-%Pred-Pre % 32     VA-Pre L 3.69  3.58   VA-%Pred-Pre % 79  77     6-minute walk test (6/14/2021): 262 m, NURIA index- 6 (57% estimated 4 year survival)  Quantitative Lung perfusion scan (6/30/21): RIGHT 47%, LEFT 52%.   Sniff test (6/14/21): Normal sniff test.   CXR (6/14/2021): Severe hyperinflation.        CT Chest (6/14/2021):   RML 8 mm solid nodule (series 4, image 186), enlarged from 5 mm in 1/12/20  RLL 9 mm solid nodule (series 4, image 232), new from 1/12/20  LLL 9 mm solid nodule (series 4, image 250), new from 1/12/20  MARLON 10 mm subsolid nodule, new from 1/12/20       Anesthesia Plan    ASA Status:  4   NPO Status:  NPO Appropriate    Anesthesia Type: General.     - Airway: ETT   Induction: Intravenous.   Maintenance: Inhalation.   Techniques and Equipment:     - Lines/Monitors: 2nd IV, Arterial Line, Central Line, PAC, CVP, BIS, NIRS     Consents            Postoperative Care            Comments:                Greg Bolden MD

## 2022-06-28 NOTE — TELEPHONE ENCOUNTER
BILATERAL LUNG donor was identified by Moisés Valle and reviewed with Dr. Sunshine.  The organ was accepted and pt was called in for transplant.    Donor UNOS ID [id number] and Match ID [id number] confirmed with Dr. Sunshine.    Donor and recipient blood type reviewed and found to be IDENTICAL.  Recipient with HLA antibodies: YES  Crossmatch required   Prospective: N/A   Virtual: Yes  Crossmatch reviewed with Dr. Purvis, immunology staff on call and deemed negative based on organ specific protocol.     Donor specific antibodies absent, notified Dr. Sunshine.  Donor DOES NOT meet criteria to be classified as PHS INCREASED RISK.    Pt was contacted AT HOME.  Verified pt has not had any blood transfusions since their last PRA sample on 4/29/22.   Pt Instructed to remain NPO for pre-op prep.  Instructed to bring medications, oxygen, or equipment.  Pt instructed to come to the [facility]  ER.   Pt on Coumadin: NO   Intervention: n/a  Pt has had a positive COVID test: No              Date of last positive COVID test: N/A  LUNG RECIPIENT: For CF patients:  contacted to discuss antibiotic therapy orders. N/A       ABO/CMV/EBV status note:   UNOS donor ID [id number]  Donor blood type is O: Verified by donor records   Recipient blood type is O: Verified by blood bank [facility].   Donor CMV status is positive. Verified by donor records.   Recipient CMV status is positive. Verified in [facility] lab results.   Donor EBV status is positive. Verified by donor records.   Recipient EBV status is positive. Verified in [facility] lab results.   Recipient HSV status is positive. Verified in [facility] lab results.   Recipient Toxoplasma status is negative. Verified in [facility] lab results.  Donor Toxoplasma status is negative. Verified by donor records.

## 2022-06-28 NOTE — PROGRESS NOTES
Joined video call. Patient not yet logged in as of 1:06pm. Called patient to check-in and assist if needed. No answer. Left message and invited patient to call back or send a [facility] message with times to reschedule. Remained connected until 1:15pm and then logged off video visit.     Julia Campos, Ph.D., , [facility]  Clinical Health Psychologist  Phone: [phone]   Pager: [phone]  6/28/2022 1:18 PM        This patient was a no show for this scheduled appointment.

## 2022-06-28 NOTE — PROGRESS NOTES
Admission          6/28/2022 10:57 AM  -----------------------------------------------------------  Diagnosis: Lung Transplant    Admitted from: Home  Accompanied by: Son Gera Gibbons Aware of Admission: yes  Belongings: Son will bring home o2 tank and meds. Cell phone and purse remains with patient.  Admission Profile: Complete  Teaching: Orientation to unit, call don't fall, use of call light, meal times, visiting hours,  when to call for the RN (angina/sob/dizzyness, etc.), and enforced importance of safety.  Access: PIV  Telemetry: Placed on pt  Ht./Wt.: Complete  2 RN skin assessment: Completed with writer and Caludia RN. No issues noted.    Temp:  [98  F (36.7  C)] 98  F (36.7  C)  Pulse:  [120] 120  Resp:  [22] 22  BP: (147)/(98) 147/98  SpO2:  [95 %] 95 %

## 2022-06-28 NOTE — PHARMACY-ADMISSION MEDICATION HISTORY
Admission Medication History Completed by Pharmacy    See [facility] Admission Navigator for allergy information, preferred outpatient pharmacy, prior to admission medications and immunization status.     Medication History Sources:     Patient    Dispense Report    Changes made to PTA medication list (reason):    Added:   o Calcium-Vitamin D supplement (per patient, at direction of provider)    Deleted:   o Ipratropium 0.02% nebulizer solution; Take 0.5 mg by nebulization daily (patient states she only uses one nebulizer solution [Duoneb] and she did not have fill history of this nebulizer, it was self-reported)    Changed: None    Additional Information:    The patient was knowledgeable of her medications and could easily tell me the last time she had them. However, she could not recollect the dose of her calcium and vitamin D supplement.    Prior to Admission medications    Medication Sig Last Dose Taking? Auth Provider Long Term End Date   albuterol (PROAIR HFA/PROVENTIL HFA/VENTOLIN HFA) 108 (90 BASE) MCG/ACT Inhaler Inhale 2 puffs into the lungs every 6 hours as needed for shortness of breath / dyspnea or wheezing 6/26/2022 Yes Reported, Patient Yes    aspirin 81 MG chewable tablet Take 81 mg by mouth daily 6/28/2022 at 0930 Yes Reported, Patient     CALCIUM-VITAMIN D PO Take 1 tablet by mouth daily 6/28/2022 at 0930 Yes Unknown, Entered By History     fluticasone-vilanterol (BREO ELLIPTA) 100-25 MCG/INH inhaler Inhale 1 puff into the lungs daily 25 mcg 6/28/2022 at 0930 Yes Reported, Patient     furosemide (LASIX) 20 MG tablet Take 1 tablet (20 mg) by mouth daily 6/28/2022 at 0930 Yes Claribel Franks MD Yes    ipratropium - albuterol 0.5 mg/2.5 mg/3 mL (DUONEB) 0.5-2.5 (3) MG/3ML neb solution Inhale 1 vial into the lungs every 4 hours as needed for shortness of breath / dyspnea 6/27/2022 Yes Unknown, Entered By History Yes    Multiple Vitamin (QUINTABS) TABS Take 1 tablet by mouth daily 6/28/2022 at 0930 Yes  Reported, Patient     umeclidinium (INCRUSE ELLIPTA) 62.5 MCG/INH inhaler Inhale 1 puff into the lungs daily 62.5mcg 6/28/2022 at 0930 Yes Reported, Patient         Date completed: 06/28/22    Medication history completed by: JUNIOR Barker, Pharmacy Intern  I attest that the information provided in this note by the pharmacy intern is complete and accurate    Bishnu Richardson, PharmD  Inpatient Clinical Pharmacist

## 2022-06-28 NOTE — LETTER
Transition Communication Hand-off for Care Transitions to Next Level of Care Provider    Name: Sofie Rodriguez  : 1960  MRN #: [id number]  Primary Care Provider: Vinny Berrios     Primary Clinic: [facility] [address] [facility] [address]     Reason for Hospitalization:  COPD (chronic obstructive pulmonary disease) (H) [J44.9]  Lung transplant candidate [Z76.82]  S/P lung transplant (H) [Z94.2]  Admit Date/Time: 2022 10:57 AM  Discharge Date: 2022    Payor Source: Payor: MEDICARE / Plan: MEDICARE / Product Type: Medicare /     HC orders    THUAN Van, BA, RN, CMSRN, RNCC  Covering Units 6D/OBS  Pager: [phone]  Phone: [phone]  6th floor Weekend/Holiday Pager: [phone]  Observation weekend/after hours: [phone]

## 2022-06-28 NOTE — H&P
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 Sunshine.    PLAN:  Neuro/ pain/ sedation:  Acute postoperative pain  - Monitor neurological status. Notify the MD for any acute changes in exam.  - Pain: fentanyl gtt  - Sedation: propofol gtt     Pulmonary:  #Postoperative ventilatory support  #Bilateral Lung Transplant  #Hx COPD  - Titrate FiO2 for SpO2 >92%  - Intubated, ventilated   - Pulmonary hygiene: Incentive spirometer every 15- 30 minutes when awake, flutter valve, C&DB  - Acyclovir, basiliximab, methylprednisolone, mycophenolate  - Valgancyclovir  - Possible Bronch in AM  - small lungs to chest - 30-40 degree head up    Cardiovascular:  #Hx HTN  #Hx HFpEF  - Monitor hemodynamic status.   - Goal MAP >65  - Epi, norepi, vaso gtt; wean as tolerated     GI care/ Nutrition:   - NPO   - PPI  - Continue bowel regimen: miralax; PRN moM, bisacodyl supp    Renal/ Fluid Balance/ Electrolytes:   BL creat appears to be ~ 0.85  - Strict I/O, daily weights  - Avoid/limit nephrotoxins as able  - Replete lytes PRN per protocol     Endocrine:    Stress induced hyperglycemia  - insulin gtt   - Goal BG <180 for optimal healing     ID/ Antibiotics:  Stress-induced leukocytosis  - Continue to monitor fever curve, WBC and inflammatory markers as appropriate  - Prophylactic abx: Bactrim/septra and ceftazidime  - Vancomycin     Heme:     Acute blood loss anemia  Acute blood loss thrombocytopenia  No s/sx active bleeding  - continue to  monitor     MSK/ Skin:  Sternotomy  Surgical Incision  - Sternal precautions  - Postoperative incision management per protocol  - PT/OT/CR     Prophylaxis:    - Mechanical prophylaxis for DVT  - Chemical DVT prophylaxis - hold until okay with CV surgery  - PPI      Lines/ tubes/ drains:  - Arterial Line, ETT, CTs, PA catheter, RIJ, montgomery, OG  - 4 Pleural, 1 med     Disposition:  - CVICU     Discussed with CVTS fellow    ====================================    HPI:   Sofie Rodriguez is a 62 year old female with PMH significant for oxygen-dependent COPD, HF, HTN, HCV, and osteopenia. She presents today as a candidate for lung transplantation.  She states she has been in her usual state of health without fevers, cough, chills, nausea, vomiting, unexpected weight loss or gain, and sick contacts.  She denies hemoptysis, hematemesis, hematuria, and hematochezia/melena.  No recent worsening in her respiratory status; she is maintained on 2 lpm via NC at rest and 4-5 lpm with activity.  She uses a Triology with 2 lpm bled in at HS. She has a productive cough with cream/yellow colored sputum.  She is not on blood thinners; she does take a daily aspirin.  She has no personal or family history of anesthesia difficulties.  Last PO intake was yesterday evening, before midnight.     1 unit PRBCs  Kcentra  300 cellsaver  250 albumin  4.5 crystalloid    PAST MEDICAL HISTORY:   Past Medical History:   Diagnosis Date     CHF (congestive heart failure) (H)      COPD (chronic obstructive pulmonary disease) (H)      Hepatitis 2017    Hep C, [facility]     HTN (hypertension)      Osteopenia        PAST SURGICAL HISTORY:   Past Surgical History:   Procedure Laterality Date     COLONOSCOPY  2015     CV CORONARY ANGIOGRAM N/A 6/30/2021    Procedure: CV CORONARY ANGIOGRAM;  Surgeon: Alexander Cuellar MD;  Location:  HEART CARDIAC CATH LAB     CV RIGHT HEART CATH MEASUREMENTS RECORDED N/A 6/30/2021    Procedure: CV RIGHT HEART CATH;  Surgeon:  "Alexander Cuellar MD;  Location:  HEART CARDIAC CATH LAB     ENT SURGERY  1974    tonsillectomy     HAND SURGERY       LEEP TX, CERVICAL  2017    HECTOR III     LYMPH NODE BIOPSY Left     Left axilla, benign- N[facility]co       FAMILY HISTORY: No family history on file.    SOCIAL HISTORY:   Social History     Tobacco Use     Smoking status: Former Smoker     Years: 30.00     Types: Cigarettes     Quit date: 2020     Years since quittin.6     Smokeless tobacco: Never Used   Substance Use Topics     Alcohol use: Not Currently         OBJECTIVE:   1. VITAL SIGNS:    BP (!) 141/94 (BP Location: Left arm)   Pulse 115   Temp 99.8  F (37.7  C) (Oral)   Resp 20   Ht 1.575 m (5' 2\")   Wt 65.7 kg (144 lb 14.4 oz)   SpO2 94%   BMI 26.50 kg/m          2. INTAKE/ OUTPUT:        3. PHYSICAL EXAMINATION:     BP (!) 141/94 (BP Location: Left arm)   Pulse 115   Temp 99.8  F (37.7  C) (Oral)   Resp 20   Ht 1.575 m (5' 2\")   Wt 65.7 kg (144 lb 14.4 oz)   SpO2 94%   BMI 26.50 kg/m      General: sedated  Neuro: sedated  Resp: intubated, ventilated  CV: S1, S2, RRR, no m/r/g   Abdomen: Soft, non-distended, non-tender  Incisions: CDI  Extremities: warm and well perfused  CT: To suction, serosang output, no airleak    4. INVESTIGATIONS:   Arterial Blood Gases   No lab results found in last 7 days.  Complete Blood Count   Recent Labs   Lab 22  1258   WBC 6.8   HGB 13.4        Basic Metabolic Panel  Recent Labs   Lab 22  1258      POTASSIUM 4.2   CHLORIDE 93*   CO2 35*   BUN 14.2   CR 0.85   GLC 96     Liver Function Tests  Recent Labs   Lab 22  1258   AST 30   ALT 16   ALKPHOS 86   BILITOTAL 0.4   ALBUMIN 5.3*   INR 0.91     Pancreatic Enzymes  No lab results found in last 7 days.  Coagulation Profile  Recent Labs   Lab 22  1258   INR 0.91   PTT 29         5. RADIOLOGY:   Recent Results (from the past 24 hour(s))   XR Chest 2 Views    Narrative    Exam: XR CHEST 2 VW, " 6/28/2022 2:09 PM    Comparison: CT chest 4/4/2022, chest x-ray 6/14/2021    History: pre-op lung transplant    Findings:  PA and lateral views of the chest.  Trachea is midline. The heart size  is within normal limits. Atherosclerotic ectasia of the aortic arch.  Hyperinflated lungs with emphysematous changes. No acute airspace  disease. There is no pneumothorax or pleural effusion. The upper  abdomen is unremarkable. No acute osseous abnormality. Flattened  hemidiaphragms on the lateral view.      Impression    Impression:   Hyperinflated lungs with emphysematous changes. No acute airspace  disease.     I have personally reviewed the examination and initial interpretation  and I agree with the findings.    ALVINA HARRY MD         SYSTEM ID:  [id number]       =========================================      Mason Leyva MD  [facility]

## 2022-06-29 ENCOUNTER — APPOINTMENT (OUTPATIENT)
Dept: GENERAL RADIOLOGY | Facility: CLINIC | Age: 62
DRG: 007 | End: 2022-06-29
Attending: STUDENT IN AN ORGANIZED HEALTH CARE EDUCATION/TRAINING PROGRAM
Payer: MEDICARE

## 2022-06-29 ENCOUNTER — APPOINTMENT (OUTPATIENT)
Dept: GENERAL RADIOLOGY | Facility: CLINIC | Age: 62
DRG: 007 | End: 2022-06-29
Attending: PHYSICIAN ASSISTANT
Payer: MEDICARE

## 2022-06-29 ENCOUNTER — APPOINTMENT (OUTPATIENT)
Dept: GENERAL RADIOLOGY | Facility: CLINIC | Age: 62
DRG: 007 | End: 2022-06-29
Attending: SURGERY
Payer: MEDICARE

## 2022-06-29 PROBLEM — Z94.2 S/P LUNG TRANSPLANT (H): Status: ACTIVE | Noted: 2022-06-29

## 2022-06-29 PROBLEM — D84.9 IMMUNOSUPPRESSED STATUS (H): Status: ACTIVE | Noted: 2022-06-29

## 2022-06-29 PROBLEM — G89.18 ACUTE POST-OPERATIVE PAIN: Status: ACTIVE | Noted: 2022-06-29

## 2022-06-29 LAB
ALBUMIN SERPL BCG-MCNC: 2.3 G/DL (ref 3.5–5.2)
ALP SERPL-CCNC: 36 U/L (ref 35–104)
ALT SERPL W P-5'-P-CCNC: 33 U/L (ref 10–35)
ANION GAP SERPL CALCULATED.3IONS-SCNC: 15 MMOL/L (ref 7–15)
APPEARANCE FLD: ABNORMAL
APTT PPP: 32 SECONDS (ref 22–38)
APTT PPP: 52 SECONDS (ref 22–38)
AST SERPL W P-5'-P-CCNC: 143 U/L (ref 10–35)
ATRIAL RATE - MUSE: 85 BPM
ATRIAL RATE - MUSE: 98 BPM
BASE EXCESS BLDA CALC-SCNC: 0.1 MMOL/L (ref -9–1.8)
BASE EXCESS BLDA CALC-SCNC: 1.5 MMOL/L (ref -9–1.8)
BASE EXCESS BLDA CALC-SCNC: 1.8 MMOL/L (ref -9.6–2)
BASE EXCESS BLDA CALC-SCNC: 2.5 MMOL/L (ref -9.6–2)
BASE EXCESS BLDA CALC-SCNC: 3.7 MMOL/L (ref -9.6–2)
BASE EXCESS BLDA CALC-SCNC: 4.5 MMOL/L (ref -9.6–2)
BASE EXCESS BLDA CALC-SCNC: 4.9 MMOL/L (ref -9–1.8)
BASE EXCESS BLDV CALC-SCNC: -2.3 MMOL/L (ref -7.7–1.9)
BASE EXCESS BLDV CALC-SCNC: 0.9 MMOL/L (ref -7.7–1.9)
BASE EXCESS BLDV CALC-SCNC: 1.6 MMOL/L (ref -7.7–1.9)
BASE EXCESS BLDV CALC-SCNC: 2 MMOL/L (ref -7.7–1.9)
BASE EXCESS BLDV CALC-SCNC: 2.3 MMOL/L (ref -7.7–1.9)
BASE EXCESS BLDV CALC-SCNC: 5 MMOL/L (ref -7.7–1.9)
BASOPHILS # BLD AUTO: 0 10E3/UL (ref 0–0.2)
BASOPHILS NFR BLD AUTO: 0 %
BILIRUB SERPL-MCNC: 0.9 MG/DL
BLD PROD TYP BPU: NORMAL
BLD PROD TYP BPU: NORMAL
BLOOD COMPONENT TYPE: NORMAL
BLOOD COMPONENT TYPE: NORMAL
BUN SERPL-MCNC: 16.5 MG/DL (ref 8–23)
C PNEUM DNA SPEC QL NAA+PROBE: ABNORMAL
C PNEUM DNA SPEC QL NAA+PROBE: NOT DETECTED
C PNEUM DNA SPEC QL NAA+PROBE: NOT DETECTED
CA-I BLD-MCNC: 3.9 MG/DL (ref 4.4–5.2)
CA-I BLD-MCNC: 4.3 MG/DL (ref 4.4–5.2)
CA-I BLD-MCNC: 4.4 MG/DL (ref 4.4–5.2)
CA-I BLD-MCNC: 4.6 MG/DL (ref 4.4–5.2)
CALCIUM SERPL-MCNC: 8.1 MG/DL (ref 8.8–10.2)
CELL COUNT BODY FLUID SOURCE: ABNORMAL
CF REDUC 30M P MA P HEP LENFR BLD TEG: 0 % (ref 0–8)
CF REDUC 60M P MA LENFR BLD TEG: 0.1 % (ref 0–15)
CF REDUC 60M P MA P HEPASE LENFR BLD TEG: 0 % (ref 0–15)
CFT BLD TEG: 1.2 MINUTE (ref 1–3)
CFT P HPASE BLD TEG: 1.2 MINUTE (ref 1–3)
CHLORIDE SERPL-SCNC: 100 MMOL/L (ref 98–107)
CI (COAGULATION INDEX)(Z) NON NATIVE: 2.9 (ref -3–3)
CI (COAGULATION INDEX)(Z): 3.2 (ref -3–3)
CLOT ANGLE BLD TEG: 72.5 DEGREES (ref 53–72)
CLOT ANGLE P HPASE BLD TEG: 72.9 DEGREES (ref 53–72)
CLOT INIT BLD TEG: 3.8 MINUTE (ref 5–10)
CLOT INIT P HPASE BLD TEG: 3.9 MINUTE (ref 5–10)
CLOT LYSIS 30M P MA LENFR BLD TEG: 0 % (ref 0–8)
CLOT STRENGTH BLD TEG: 9.7 KD/SC (ref 4.5–11)
CLOT STRENGTH P HPASE BLD TEG: 11.1 KD/SC (ref 4.5–11)
CMV IGG SERPL IA-ACNC: >10 U/ML
CMV IGG SERPL IA-ACNC: ABNORMAL
CODING SYSTEM: NORMAL
CODING SYSTEM: NORMAL
COLOR FLD: ABNORMAL
CREAT SERPL-MCNC: 0.94 MG/DL (ref 0.51–0.95)
CROSSMATCH: NORMAL
CROSSMATCH: NORMAL
DEPRECATED HCO3 PLAS-SCNC: 24 MMOL/L (ref 22–29)
DIASTOLIC BLOOD PRESSURE - MUSE: NORMAL MMHG
DIASTOLIC BLOOD PRESSURE - MUSE: NORMAL MMHG
DONOR IDENTIFICATION: NORMAL
DSA COMMENTS: NORMAL
DSA PRESENT: NO
DSA TEST METHOD: NORMAL
EBV VCA IGG SER IA-ACNC: >750 U/ML
EBV VCA IGG SER IA-ACNC: POSITIVE
EOSINOPHIL # BLD AUTO: 0 10E3/UL (ref 0–0.7)
EOSINOPHIL NFR BLD AUTO: 0 %
EOSINOPHIL NFR FLD MANUAL: 1 %
ERYTHROCYTE [DISTWIDTH] IN BLOOD BY AUTOMATED COUNT: 12.7 % (ref 10–15)
ERYTHROCYTE [DISTWIDTH] IN BLOOD BY AUTOMATED COUNT: 14.8 % (ref 10–15)
ERYTHROCYTE [DISTWIDTH] IN BLOOD BY AUTOMATED COUNT: 15.1 % (ref 10–15)
FIBRINOGEN PPP-MCNC: 192 MG/DL (ref 170–490)
FLUAV H1 2009 PAND RNA SPEC QL NAA+PROBE: ABNORMAL
FLUAV H1 2009 PAND RNA SPEC QL NAA+PROBE: NOT DETECTED
FLUAV H1 2009 PAND RNA SPEC QL NAA+PROBE: NOT DETECTED
FLUAV H1 RNA SPEC QL NAA+PROBE: ABNORMAL
FLUAV H1 RNA SPEC QL NAA+PROBE: NOT DETECTED
FLUAV H1 RNA SPEC QL NAA+PROBE: NOT DETECTED
FLUAV H3 RNA SPEC QL NAA+PROBE: ABNORMAL
FLUAV H3 RNA SPEC QL NAA+PROBE: NOT DETECTED
FLUAV H3 RNA SPEC QL NAA+PROBE: NOT DETECTED
FLUAV RNA SPEC QL NAA+PROBE: ABNORMAL
FLUAV RNA SPEC QL NAA+PROBE: NOT DETECTED
FLUAV RNA SPEC QL NAA+PROBE: NOT DETECTED
FLUBV RNA SPEC QL NAA+PROBE: ABNORMAL
FLUBV RNA SPEC QL NAA+PROBE: NOT DETECTED
FLUBV RNA SPEC QL NAA+PROBE: NOT DETECTED
GFR SERPL CREATININE-BSD FRML MDRD: 69 ML/MIN/1.73M2
GLUCOSE BLD-MCNC: 115 MG/DL (ref 70–99)
GLUCOSE BLD-MCNC: 123 MG/DL (ref 70–99)
GLUCOSE BLD-MCNC: 139 MG/DL (ref 70–99)
GLUCOSE BLD-MCNC: 164 MG/DL (ref 70–99)
GLUCOSE BLDC GLUCOMTR-MCNC: 113 MG/DL (ref 70–99)
GLUCOSE BLDC GLUCOMTR-MCNC: 126 MG/DL (ref 70–99)
GLUCOSE BLDC GLUCOMTR-MCNC: 133 MG/DL (ref 70–99)
GLUCOSE BLDC GLUCOMTR-MCNC: 157 MG/DL (ref 70–99)
GLUCOSE BLDC GLUCOMTR-MCNC: 157 MG/DL (ref 70–99)
GLUCOSE BLDC GLUCOMTR-MCNC: 169 MG/DL (ref 70–99)
GLUCOSE BLDC GLUCOMTR-MCNC: 200 MG/DL (ref 70–99)
GLUCOSE BLDC GLUCOMTR-MCNC: 221 MG/DL (ref 70–99)
GLUCOSE BLDC GLUCOMTR-MCNC: 225 MG/DL (ref 70–99)
GLUCOSE BLDC GLUCOMTR-MCNC: 254 MG/DL (ref 70–99)
GLUCOSE BLDC GLUCOMTR-MCNC: 254 MG/DL (ref 70–99)
GLUCOSE BLDC GLUCOMTR-MCNC: 280 MG/DL (ref 70–99)
GLUCOSE SERPL-MCNC: 159 MG/DL (ref 70–99)
GRAM STAIN RESULT: ABNORMAL
GRAM STAIN RESULT: ABNORMAL
GRAM STAIN RESULT: NORMAL
HADV DNA SPEC QL NAA+PROBE: ABNORMAL
HADV DNA SPEC QL NAA+PROBE: NOT DETECTED
HADV DNA SPEC QL NAA+PROBE: NOT DETECTED
HCO3 BLD-SCNC: 25 MMOL/L (ref 21–28)
HCO3 BLD-SCNC: 26 MMOL/L (ref 21–28)
HCO3 BLD-SCNC: 29 MMOL/L (ref 21–28)
HCO3 BLDA-SCNC: 25 MMOL/L (ref 21–28)
HCO3 BLDA-SCNC: 27 MMOL/L (ref 21–28)
HCO3 BLDA-SCNC: 28 MMOL/L (ref 21–28)
HCO3 BLDA-SCNC: 29 MMOL/L (ref 21–28)
HCO3 BLDV-SCNC: 23 MMOL/L (ref 21–28)
HCO3 BLDV-SCNC: 26 MMOL/L (ref 21–28)
HCO3 BLDV-SCNC: 27 MMOL/L (ref 21–28)
HCO3 BLDV-SCNC: 27 MMOL/L (ref 21–28)
HCO3 BLDV-SCNC: 28 MMOL/L (ref 21–28)
HCO3 BLDV-SCNC: 30 MMOL/L (ref 21–28)
HCOV PNL SPEC NAA+PROBE: ABNORMAL
HCOV PNL SPEC NAA+PROBE: NOT DETECTED
HCOV PNL SPEC NAA+PROBE: NOT DETECTED
HCT VFR BLD AUTO: 21.4 % (ref 35–47)
HCT VFR BLD AUTO: 23.1 % (ref 35–47)
HCT VFR BLD AUTO: 27.5 % (ref 35–47)
HCV AB SERPL QL IA: REACTIVE
HGB BLD-MCNC: 6.8 G/DL (ref 11.7–15.7)
HGB BLD-MCNC: 6.8 G/DL (ref 11.7–15.7)
HGB BLD-MCNC: 7.3 G/DL (ref 11.7–15.7)
HGB BLD-MCNC: 7.6 G/DL (ref 11.7–15.7)
HGB BLD-MCNC: 8 G/DL (ref 11.7–15.7)
HGB BLD-MCNC: 8.5 G/DL (ref 11.7–15.7)
HGB BLD-MCNC: 9 G/DL (ref 11.7–15.7)
HGB BLD-MCNC: 9.1 G/DL (ref 11.7–15.7)
HGB BLD-MCNC: 9.8 G/DL (ref 11.7–15.7)
HMPV RNA SPEC QL NAA+PROBE: ABNORMAL
HMPV RNA SPEC QL NAA+PROBE: NOT DETECTED
HMPV RNA SPEC QL NAA+PROBE: NOT DETECTED
HPIV1 RNA SPEC QL NAA+PROBE: ABNORMAL
HPIV1 RNA SPEC QL NAA+PROBE: NOT DETECTED
HPIV1 RNA SPEC QL NAA+PROBE: NOT DETECTED
HPIV2 RNA SPEC QL NAA+PROBE: ABNORMAL
HPIV2 RNA SPEC QL NAA+PROBE: NOT DETECTED
HPIV2 RNA SPEC QL NAA+PROBE: NOT DETECTED
HPIV3 RNA SPEC QL NAA+PROBE: ABNORMAL
HPIV3 RNA SPEC QL NAA+PROBE: NOT DETECTED
HPIV3 RNA SPEC QL NAA+PROBE: NOT DETECTED
HPIV4 RNA SPEC QL NAA+PROBE: ABNORMAL
HPIV4 RNA SPEC QL NAA+PROBE: NOT DETECTED
HPIV4 RNA SPEC QL NAA+PROBE: NOT DETECTED
HSV1 IGG SERPL QL IA: 0.15 INDEX
HSV1 IGG SERPL QL IA: ABNORMAL
HSV2 IGG SERPL QL IA: 1.22 INDEX
HSV2 IGG SERPL QL IA: ABNORMAL
IGG SERPL-MCNC: 1381 MG/DL (ref 610–1616)
IMM GRANULOCYTES # BLD: 0.2 10E3/UL
IMM GRANULOCYTES NFR BLD: 1 %
INR PPP: 1.33 (ref 0.85–1.15)
INR PPP: 1.6 (ref 0.85–1.15)
INTERPRETATION ECG - MUSE: NORMAL
INTERPRETATION ECG - MUSE: NORMAL
ISSUE DATE AND TIME: NORMAL
ISSUE DATE AND TIME: NORMAL
KOH PREPARATION: NORMAL
KOH PREPARATION: NORMAL
LACTATE BLD-SCNC: 1.8 MMOL/L
LACTATE BLD-SCNC: 1.9 MMOL/L
LACTATE BLD-SCNC: 2.6 MMOL/L
LACTATE BLD-SCNC: 3 MMOL/L
LACTATE SERPL-SCNC: 1.2 MMOL/L (ref 0.7–2)
LACTATE SERPL-SCNC: 2.4 MMOL/L (ref 0.7–2)
LACTATE SERPL-SCNC: 3.3 MMOL/L (ref 0.7–2)
LACTATE SERPL-SCNC: 3.5 MMOL/L (ref 0.7–2)
LACTATE SERPL-SCNC: 4.1 MMOL/L (ref 0.7–2)
LACTATE SERPL-SCNC: 5.1 MMOL/L (ref 0.7–2)
LACTATE SERPL-SCNC: 6.7 MMOL/L (ref 0.7–2)
LYMPHOCYTES # BLD AUTO: 0.3 10E3/UL (ref 0.8–5.3)
LYMPHOCYTES NFR BLD AUTO: 2 %
LYMPHOCYTES NFR FLD MANUAL: 3 %
M PNEUMO DNA SPEC QL NAA+PROBE: ABNORMAL
M PNEUMO DNA SPEC QL NAA+PROBE: NOT DETECTED
M PNEUMO DNA SPEC QL NAA+PROBE: NOT DETECTED
MAGNESIUM SERPL-MCNC: 2.3 MG/DL (ref 1.7–2.3)
MCF BLD TEG: 66 MM (ref 50–70)
MCF P HPASE BLD TEG: 68.9 MM (ref 50–70)
MCH RBC QN AUTO: 30.5 PG (ref 26.5–33)
MCH RBC QN AUTO: 30.6 PG (ref 26.5–33)
MCH RBC QN AUTO: 31.5 PG (ref 26.5–33)
MCHC RBC AUTO-ENTMCNC: 31.8 G/DL (ref 31.5–36.5)
MCHC RBC AUTO-ENTMCNC: 32.7 G/DL (ref 31.5–36.5)
MCHC RBC AUTO-ENTMCNC: 32.9 G/DL (ref 31.5–36.5)
MCV RBC AUTO: 93 FL (ref 78–100)
MCV RBC AUTO: 93 FL (ref 78–100)
MCV RBC AUTO: 99 FL (ref 78–100)
MONOCYTES # BLD AUTO: 0.7 10E3/UL (ref 0–1.3)
MONOCYTES NFR BLD AUTO: 4 %
MONOS+MACROS NFR FLD MANUAL: 30 %
MRSA DNA SPEC QL NAA+PROBE: NEGATIVE
NEUTROPHILS # BLD AUTO: 15.1 10E3/UL (ref 1.6–8.3)
NEUTROPHILS NFR BLD AUTO: 93 %
NEUTS BAND NFR FLD MANUAL: 66 %
NRBC # BLD AUTO: 0 10E3/UL
NRBC BLD AUTO-RTO: 0 /100
O2/TOTAL GAS SETTING VFR VENT: 100 %
O2/TOTAL GAS SETTING VFR VENT: 40 %
O2/TOTAL GAS SETTING VFR VENT: 79 %
O2/TOTAL GAS SETTING VFR VENT: 79 %
ORGAN: NORMAL
OXYHGB MFR BLDA: 98 % (ref 92–100)
OXYHGB MFR BLDV: 68 % (ref 70–75)
OXYHGB MFR BLDV: 69 % (ref 70–75)
OXYHGB MFR BLDV: 73 % (ref 70–75)
OXYHGB MFR BLDV: 74 % (ref 70–75)
OXYHGB MFR BLDV: 79 % (ref 70–75)
P AXIS - MUSE: 80 DEGREES
P AXIS - MUSE: 83 DEGREES
PCO2 BLD: 40 MM HG (ref 35–45)
PCO2 BLD: 41 MM HG (ref 35–45)
PCO2 BLD: 41 MM HG (ref 35–45)
PCO2 BLDA: 35 MM HG (ref 35–45)
PCO2 BLDA: 37 MM HG (ref 35–45)
PCO2 BLDA: 39 MM HG (ref 35–45)
PCO2 BLDA: 40 MM HG (ref 35–45)
PCO2 BLDV: 40 MM HG (ref 40–50)
PCO2 BLDV: 43 MM HG (ref 40–50)
PCO2 BLDV: 45 MM HG (ref 40–50)
PCO2 BLDV: 46 MM HG (ref 40–50)
PCO2 BLDV: 46 MM HG (ref 40–50)
PCO2 BLDV: 52 MM HG (ref 40–50)
PH BLD: 7.41 [PH] (ref 7.35–7.45)
PH BLD: 7.42 [PH] (ref 7.35–7.45)
PH BLD: 7.46 [PH] (ref 7.35–7.45)
PH BLDA: 7.44 [PH] (ref 7.35–7.45)
PH BLDA: 7.46 [PH] (ref 7.35–7.45)
PH BLDA: 7.47 [PH] (ref 7.35–7.45)
PH BLDA: 7.48 [PH] (ref 7.35–7.45)
PH BLDV: 7.34 [PH] (ref 7.32–7.43)
PH BLDV: 7.37 [PH] (ref 7.32–7.43)
PH BLDV: 7.38 [PH] (ref 7.32–7.43)
PH BLDV: 7.39 [PH] (ref 7.32–7.43)
PH BLDV: 7.4 [PH] (ref 7.32–7.43)
PH BLDV: 7.43 [PH] (ref 7.32–7.43)
PHOSPHATE SERPL-MCNC: 1.6 MG/DL (ref 2.5–4.5)
PHOSPHATE SERPL-MCNC: 2.1 MG/DL (ref 2.5–4.5)
PLAT MORPH BLD: NORMAL
PLATELET # BLD AUTO: 103 10E3/UL (ref 150–450)
PLATELET # BLD AUTO: 124 10E3/UL (ref 150–450)
PLATELET # BLD AUTO: 133 10E3/UL (ref 150–450)
PO2 BLD: 163 MM HG (ref 80–105)
PO2 BLD: 196 MM HG (ref 80–105)
PO2 BLD: 446 MM HG (ref 80–105)
PO2 BLDA: 446 MM HG (ref 80–105)
PO2 BLDA: 450 MM HG (ref 80–105)
PO2 BLDA: 503 MM HG (ref 80–105)
PO2 BLDA: 539 MM HG (ref 80–105)
PO2 BLDV: 33 MM HG (ref 25–47)
PO2 BLDV: 37 MM HG (ref 25–47)
PO2 BLDV: 37 MM HG (ref 25–47)
PO2 BLDV: 38 MM HG (ref 25–47)
PO2 BLDV: 40 MM HG (ref 25–47)
PO2 BLDV: 41 MM HG (ref 25–47)
POTASSIUM BLD-SCNC: 3.2 MMOL/L (ref 3.5–5)
POTASSIUM BLD-SCNC: 3.2 MMOL/L (ref 3.5–5)
POTASSIUM BLD-SCNC: 3.6 MMOL/L (ref 3.5–5)
POTASSIUM BLD-SCNC: 4.1 MMOL/L (ref 3.5–5)
POTASSIUM SERPL-SCNC: 4 MMOL/L (ref 3.4–5.3)
PR INTERVAL - MUSE: 126 MS
PR INTERVAL - MUSE: 138 MS
PROT SERPL-MCNC: 3.7 G/DL (ref 6.4–8.3)
QRS DURATION - MUSE: 68 MS
QRS DURATION - MUSE: 72 MS
QT - MUSE: 370 MS
QT - MUSE: 412 MS
QTC - MUSE: 440 MS
QTC - MUSE: 525 MS
R AXIS - MUSE: 48 DEGREES
R AXIS - MUSE: 63 DEGREES
RBC # BLD AUTO: 2.16 10E6/UL (ref 3.8–5.2)
RBC # BLD AUTO: 2.48 10E6/UL (ref 3.8–5.2)
RBC # BLD AUTO: 2.95 10E6/UL (ref 3.8–5.2)
RBC MORPH BLD: NORMAL
RSV RNA SPEC QL NAA+PROBE: ABNORMAL
RSV RNA SPEC QL NAA+PROBE: ABNORMAL
RSV RNA SPEC QL NAA+PROBE: NOT DETECTED
RV+EV RNA SPEC QL NAA+PROBE: ABNORMAL
RV+EV RNA SPEC QL NAA+PROBE: NOT DETECTED
RV+EV RNA SPEC QL NAA+PROBE: NOT DETECTED
SA 1 CELL: NORMAL
SA 1 TEST METHOD: NORMAL
SA 2 CELL: NORMAL
SA 2 TEST METHOD: NORMAL
SA TARGET DNA: NEGATIVE
SA1 HI RISK ABY: NORMAL
SA1 MOD RISK ABY: NORMAL
SA2 HI RISK ABY: NORMAL
SA2 MOD RISK ABY: NORMAL
SODIUM BLD-SCNC: 139 MMOL/L (ref 133–144)
SODIUM BLD-SCNC: 140 MMOL/L (ref 133–144)
SODIUM SERPL-SCNC: 139 MMOL/L (ref 136–145)
SYSTOLIC BLOOD PRESSURE - MUSE: NORMAL MMHG
SYSTOLIC BLOOD PRESSURE - MUSE: NORMAL MMHG
T AXIS - MUSE: 70 DEGREES
T AXIS - MUSE: 81 DEGREES
TACROLIMUS BLD-MCNC: <1 UG/L (ref 5–15)
TME LAST DOSE: ABNORMAL H
TME LAST DOSE: ABNORMAL H
UNACCEPTABLE ANTIGENS: NORMAL
UNIT ABO/RH: NORMAL
UNIT ABO/RH: NORMAL
UNIT NUMBER: NORMAL
UNIT NUMBER: NORMAL
UNIT STATUS: NORMAL
UNIT STATUS: NORMAL
UNIT TYPE ISBT: 5100
UNIT TYPE ISBT: 5100
UNOS CPRA: 0
VENTRICULAR RATE- MUSE: 85 BPM
VENTRICULAR RATE- MUSE: 98 BPM
WBC # BLD AUTO: 12.6 10E3/UL (ref 4–11)
WBC # BLD AUTO: 16.2 10E3/UL (ref 4–11)
WBC # BLD AUTO: 9.6 10E3/UL (ref 4–11)
WBC # FLD AUTO: 1530 /UL
ZZZSA 1  COMMENTS: NORMAL
ZZZSA 2 COMMENTS: NORMAL

## 2022-06-29 PROCEDURE — 250N000009 HC RX 250: Performed by: STUDENT IN AN ORGANIZED HEALTH CARE EDUCATION/TRAINING PROGRAM

## 2022-06-29 PROCEDURE — 87205 SMEAR GRAM STAIN: CPT | Performed by: INTERNAL MEDICINE

## 2022-06-29 PROCEDURE — 999N000155 HC STATISTIC RAPCV CVP MONITORING

## 2022-06-29 PROCEDURE — 85018 HEMOGLOBIN: CPT | Performed by: STUDENT IN AN ORGANIZED HEALTH CARE EDUCATION/TRAINING PROGRAM

## 2022-06-29 PROCEDURE — 250N000015 HC RX FACTOR IP MED 636 OP 636: Performed by: THORACIC SURGERY (CARDIOTHORACIC VASCULAR SURGERY)

## 2022-06-29 PROCEDURE — 87102 FUNGUS ISOLATION CULTURE: CPT | Performed by: INTERNAL MEDICINE

## 2022-06-29 PROCEDURE — 85027 COMPLETE CBC AUTOMATED: CPT | Performed by: THORACIC SURGERY (CARDIOTHORACIC VASCULAR SURGERY)

## 2022-06-29 PROCEDURE — 85730 THROMBOPLASTIN TIME PARTIAL: CPT | Performed by: THORACIC SURGERY (CARDIOTHORACIC VASCULAR SURGERY)

## 2022-06-29 PROCEDURE — 84132 ASSAY OF SERUM POTASSIUM: CPT | Performed by: STUDENT IN AN ORGANIZED HEALTH CARE EDUCATION/TRAINING PROGRAM

## 2022-06-29 PROCEDURE — 999N000015 HC STATISTIC ARTERIAL MONITORING DAILY

## 2022-06-29 PROCEDURE — 999N000157 HC STATISTIC RCP TIME EA 10 MIN

## 2022-06-29 PROCEDURE — 258N000003 HC RX IP 258 OP 636: Performed by: PHYSICIAN ASSISTANT

## 2022-06-29 PROCEDURE — 250N000011 HC RX IP 250 OP 636: Performed by: PHYSICIAN ASSISTANT

## 2022-06-29 PROCEDURE — 87641 MR-STAPH DNA AMP PROBE: CPT | Performed by: STUDENT IN AN ORGANIZED HEALTH CARE EDUCATION/TRAINING PROGRAM

## 2022-06-29 PROCEDURE — 85384 FIBRINOGEN ACTIVITY: CPT | Performed by: THORACIC SURGERY (CARDIOTHORACIC VASCULAR SURGERY)

## 2022-06-29 PROCEDURE — 87070 CULTURE OTHR SPECIMN AEROBIC: CPT | Performed by: INTERNAL MEDICINE

## 2022-06-29 PROCEDURE — 83605 ASSAY OF LACTIC ACID: CPT

## 2022-06-29 PROCEDURE — 82805 BLOOD GASES W/O2 SATURATION: CPT | Performed by: THORACIC SURGERY (CARDIOTHORACIC VASCULAR SURGERY)

## 2022-06-29 PROCEDURE — 93005 ELECTROCARDIOGRAM TRACING: CPT

## 2022-06-29 PROCEDURE — 82805 BLOOD GASES W/O2 SATURATION: CPT | Performed by: STUDENT IN AN ORGANIZED HEALTH CARE EDUCATION/TRAINING PROGRAM

## 2022-06-29 PROCEDURE — 258N000003 HC RX IP 258 OP 636: Performed by: THORACIC SURGERY (CARDIOTHORACIC VASCULAR SURGERY)

## 2022-06-29 PROCEDURE — 999N000045 HC STATISTIC DAILY SWAN MONITORING

## 2022-06-29 PROCEDURE — 71045 X-RAY EXAM CHEST 1 VIEW: CPT | Mod: 26 | Performed by: RADIOLOGY

## 2022-06-29 PROCEDURE — 250N000011 HC RX IP 250 OP 636: Performed by: STUDENT IN AN ORGANIZED HEALTH CARE EDUCATION/TRAINING PROGRAM

## 2022-06-29 PROCEDURE — 85396 CLOTTING ASSAY WHOLE BLOOD: CPT | Performed by: THORACIC SURGERY (CARDIOTHORACIC VASCULAR SURGERY)

## 2022-06-29 PROCEDURE — 94640 AIRWAY INHALATION TREATMENT: CPT

## 2022-06-29 PROCEDURE — 82330 ASSAY OF CALCIUM: CPT | Performed by: STUDENT IN AN ORGANIZED HEALTH CARE EDUCATION/TRAINING PROGRAM

## 2022-06-29 PROCEDURE — 31624 DX BRONCHOSCOPE/LAVAGE: CPT | Performed by: INTERNAL MEDICINE

## 2022-06-29 PROCEDURE — 82803 BLOOD GASES ANY COMBINATION: CPT | Performed by: SURGERY

## 2022-06-29 PROCEDURE — 84100 ASSAY OF PHOSPHORUS: CPT | Performed by: THORACIC SURGERY (CARDIOTHORACIC VASCULAR SURGERY)

## 2022-06-29 PROCEDURE — 82803 BLOOD GASES ANY COMBINATION: CPT | Performed by: STUDENT IN AN ORGANIZED HEALTH CARE EDUCATION/TRAINING PROGRAM

## 2022-06-29 PROCEDURE — 82810 BLOOD GASES O2 SAT ONLY: CPT

## 2022-06-29 PROCEDURE — 250N000011 HC RX IP 250 OP 636: Performed by: ANESTHESIOLOGY

## 2022-06-29 PROCEDURE — 83735 ASSAY OF MAGNESIUM: CPT | Performed by: STUDENT IN AN ORGANIZED HEALTH CARE EDUCATION/TRAINING PROGRAM

## 2022-06-29 PROCEDURE — 258N000003 HC RX IP 258 OP 636: Performed by: STUDENT IN AN ORGANIZED HEALTH CARE EDUCATION/TRAINING PROGRAM

## 2022-06-29 PROCEDURE — 89051 BODY FLUID CELL COUNT: CPT | Performed by: INTERNAL MEDICINE

## 2022-06-29 PROCEDURE — 999N000063 XR CHEST PORT 1 VIEW

## 2022-06-29 PROCEDURE — 93010 ELECTROCARDIOGRAM REPORT: CPT | Mod: 59 | Performed by: INTERNAL MEDICINE

## 2022-06-29 PROCEDURE — 84450 TRANSFERASE (AST) (SGOT): CPT | Performed by: STUDENT IN AN ORGANIZED HEALTH CARE EDUCATION/TRAINING PROGRAM

## 2022-06-29 PROCEDURE — 82330 ASSAY OF CALCIUM: CPT

## 2022-06-29 PROCEDURE — 85610 PROTHROMBIN TIME: CPT | Performed by: STUDENT IN AN ORGANIZED HEALTH CARE EDUCATION/TRAINING PROGRAM

## 2022-06-29 PROCEDURE — P9041 ALBUMIN (HUMAN),5%, 50ML: HCPCS | Performed by: STUDENT IN AN ORGANIZED HEALTH CARE EDUCATION/TRAINING PROGRAM

## 2022-06-29 PROCEDURE — 87210 SMEAR WET MOUNT SALINE/INK: CPT | Performed by: INTERNAL MEDICINE

## 2022-06-29 PROCEDURE — 250N000011 HC RX IP 250 OP 636: Performed by: THORACIC SURGERY (CARDIOTHORACIC VASCULAR SURGERY)

## 2022-06-29 PROCEDURE — 87486 CHLMYD PNEUM DNA AMP PROBE: CPT | Performed by: INTERNAL MEDICINE

## 2022-06-29 PROCEDURE — 250N000009 HC RX 250: Performed by: ANESTHESIOLOGY

## 2022-06-29 PROCEDURE — 87206 SMEAR FLUORESCENT/ACID STAI: CPT | Performed by: INTERNAL MEDICINE

## 2022-06-29 PROCEDURE — 250N000012 HC RX MED GY IP 250 OP 636 PS 637: Performed by: STUDENT IN AN ORGANIZED HEALTH CARE EDUCATION/TRAINING PROGRAM

## 2022-06-29 PROCEDURE — 250N000009 HC RX 250: Performed by: THORACIC SURGERY (CARDIOTHORACIC VASCULAR SURGERY)

## 2022-06-29 PROCEDURE — 250N000013 HC RX MED GY IP 250 OP 250 PS 637: Performed by: PHYSICIAN ASSISTANT

## 2022-06-29 PROCEDURE — P9016 RBC LEUKOCYTES REDUCED: HCPCS | Performed by: PHYSICIAN ASSISTANT

## 2022-06-29 PROCEDURE — 85027 COMPLETE CBC AUTOMATED: CPT | Performed by: STUDENT IN AN ORGANIZED HEALTH CARE EDUCATION/TRAINING PROGRAM

## 2022-06-29 PROCEDURE — C9113 INJ PANTOPRAZOLE SODIUM, VIA: HCPCS | Performed by: STUDENT IN AN ORGANIZED HEALTH CARE EDUCATION/TRAINING PROGRAM

## 2022-06-29 PROCEDURE — 85041 AUTOMATED RBC COUNT: CPT | Performed by: THORACIC SURGERY (CARDIOTHORACIC VASCULAR SURGERY)

## 2022-06-29 PROCEDURE — 200N000002 HC R&B ICU UMMC

## 2022-06-29 PROCEDURE — 94002 VENT MGMT INPAT INIT DAY: CPT

## 2022-06-29 PROCEDURE — 258N000003 HC RX IP 258 OP 636

## 2022-06-29 PROCEDURE — 87116 MYCOBACTERIA CULTURE: CPT | Performed by: INTERNAL MEDICINE

## 2022-06-29 PROCEDURE — 85018 HEMOGLOBIN: CPT | Performed by: PHYSICIAN ASSISTANT

## 2022-06-29 PROCEDURE — 3E043XZ INTRODUCTION OF VASOPRESSOR INTO CENTRAL VEIN, PERCUTANEOUS APPROACH: ICD-10-PCS | Performed by: THORACIC SURGERY (CARDIOTHORACIC VASCULAR SURGERY)

## 2022-06-29 PROCEDURE — 83605 ASSAY OF LACTIC ACID: CPT | Performed by: STUDENT IN AN ORGANIZED HEALTH CARE EDUCATION/TRAINING PROGRAM

## 2022-06-29 PROCEDURE — 80197 ASSAY OF TACROLIMUS: CPT | Performed by: STUDENT IN AN ORGANIZED HEALTH CARE EDUCATION/TRAINING PROGRAM

## 2022-06-29 PROCEDURE — 74018 RADEX ABDOMEN 1 VIEW: CPT | Mod: 26 | Performed by: STUDENT IN AN ORGANIZED HEALTH CARE EDUCATION/TRAINING PROGRAM

## 2022-06-29 PROCEDURE — 44500 INTRO GASTROINTESTINAL TUBE: CPT

## 2022-06-29 PROCEDURE — 84100 ASSAY OF PHOSPHORUS: CPT | Performed by: STUDENT IN AN ORGANIZED HEALTH CARE EDUCATION/TRAINING PROGRAM

## 2022-06-29 PROCEDURE — 94668 MNPJ CHEST WALL SBSQ: CPT

## 2022-06-29 PROCEDURE — 87536 HIV-1 QUANT&REVRSE TRNSCRPJ: CPT | Performed by: SURGERY

## 2022-06-29 PROCEDURE — 87071 CULTURE AEROBIC QUANT OTHER: CPT | Performed by: INTERNAL MEDICINE

## 2022-06-29 PROCEDURE — 85730 THROMBOPLASTIN TIME PARTIAL: CPT | Performed by: STUDENT IN AN ORGANIZED HEALTH CARE EDUCATION/TRAINING PROGRAM

## 2022-06-29 PROCEDURE — 271N000002 HC RX 271: Performed by: ANESTHESIOLOGY

## 2022-06-29 PROCEDURE — 31624 DX BRONCHOSCOPE/LAVAGE: CPT

## 2022-06-29 PROCEDURE — 250N000011 HC RX IP 250 OP 636

## 2022-06-29 PROCEDURE — P9041 ALBUMIN (HUMAN),5%, 50ML: HCPCS

## 2022-06-29 PROCEDURE — 250N000013 HC RX MED GY IP 250 OP 250 PS 637: Performed by: STUDENT IN AN ORGANIZED HEALTH CARE EDUCATION/TRAINING PROGRAM

## 2022-06-29 PROCEDURE — 999N000065 XR ABDOMEN PORT 1 VIEWS

## 2022-06-29 PROCEDURE — 84132 ASSAY OF SERUM POTASSIUM: CPT

## 2022-06-29 PROCEDURE — 99223 1ST HOSP IP/OBS HIGH 75: CPT | Mod: 24 | Performed by: INTERNAL MEDICINE

## 2022-06-29 PROCEDURE — 85610 PROTHROMBIN TIME: CPT | Performed by: THORACIC SURGERY (CARDIOTHORACIC VASCULAR SURGERY)

## 2022-06-29 PROCEDURE — 94667 MNPJ CHEST WALL 1ST: CPT

## 2022-06-29 PROCEDURE — 83605 ASSAY OF LACTIC ACID: CPT | Performed by: PHYSICIAN ASSISTANT

## 2022-06-29 PROCEDURE — 250N000011 HC RX IP 250 OP 636: Performed by: SURGERY

## 2022-06-29 PROCEDURE — 99291 CRITICAL CARE FIRST HOUR: CPT | Mod: 24 | Performed by: ANESTHESIOLOGY

## 2022-06-29 PROCEDURE — 94640 AIRWAY INHALATION TREATMENT: CPT | Mod: 76

## 2022-06-29 PROCEDURE — 71045 X-RAY EXAM CHEST 1 VIEW: CPT

## 2022-06-29 PROCEDURE — 36415 COLL VENOUS BLD VENIPUNCTURE: CPT | Performed by: SURGERY

## 2022-06-29 PROCEDURE — 83605 ASSAY OF LACTIC ACID: CPT | Performed by: THORACIC SURGERY (CARDIOTHORACIC VASCULAR SURGERY)

## 2022-06-29 PROCEDURE — 82962 GLUCOSE BLOOD TEST: CPT

## 2022-06-29 RX ORDER — TACROLIMUS 0.5 MG/1
1 CAPSULE ORAL
Status: DISCONTINUED | OUTPATIENT
Start: 2022-06-29 | End: 2022-06-30

## 2022-06-29 RX ORDER — ONDANSETRON 2 MG/ML
4 INJECTION INTRAMUSCULAR; INTRAVENOUS EVERY 6 HOURS PRN
Status: DISCONTINUED | OUTPATIENT
Start: 2022-06-29 | End: 2022-08-05

## 2022-06-29 RX ORDER — ALBUMIN, HUMAN INJ 5% 5 %
SOLUTION INTRAVENOUS
Status: COMPLETED
Start: 2022-06-29 | End: 2022-06-29

## 2022-06-29 RX ORDER — CEFTAZIDIME 2 G/1
2 INJECTION, POWDER, FOR SOLUTION INTRAVENOUS EVERY 8 HOURS
Status: DISCONTINUED | OUTPATIENT
Start: 2022-06-29 | End: 2022-07-02

## 2022-06-29 RX ORDER — NALOXONE HYDROCHLORIDE 0.4 MG/ML
0.4 INJECTION, SOLUTION INTRAMUSCULAR; INTRAVENOUS; SUBCUTANEOUS
Status: ACTIVE | OUTPATIENT
Start: 2022-06-29 | End: 2022-06-30

## 2022-06-29 RX ORDER — SODIUM CHLORIDE, SODIUM LACTATE, POTASSIUM CHLORIDE, CALCIUM CHLORIDE 600; 310; 30; 20 MG/100ML; MG/100ML; MG/100ML; MG/100ML
INJECTION, SOLUTION INTRAVENOUS CONTINUOUS PRN
Status: DISCONTINUED | OUTPATIENT
Start: 2022-06-29 | End: 2022-06-29

## 2022-06-29 RX ORDER — ACETAMINOPHEN 325 MG/1
650 TABLET ORAL EVERY 4 HOURS PRN
Status: DISCONTINUED | OUTPATIENT
Start: 2022-07-01 | End: 2022-07-28

## 2022-06-29 RX ORDER — NYSTATIN 100000/ML
1000000 SUSPENSION, ORAL (FINAL DOSE FORM) ORAL 4 TIMES DAILY
Status: DISCONTINUED | OUTPATIENT
Start: 2022-06-29 | End: 2022-08-17 | Stop reason: HOSPADM

## 2022-06-29 RX ORDER — NALOXONE HYDROCHLORIDE 0.4 MG/ML
0.4 INJECTION, SOLUTION INTRAMUSCULAR; INTRAVENOUS; SUBCUTANEOUS
Status: DISCONTINUED | OUTPATIENT
Start: 2022-06-29 | End: 2022-08-15

## 2022-06-29 RX ORDER — DEXMEDETOMIDINE HYDROCHLORIDE 4 UG/ML
.1-1.2 INJECTION, SOLUTION INTRAVENOUS CONTINUOUS
Status: DISCONTINUED | OUTPATIENT
Start: 2022-06-29 | End: 2022-07-01

## 2022-06-29 RX ORDER — ALBUMIN, HUMAN INJ 5% 5 %
SOLUTION INTRAVENOUS CONTINUOUS PRN
Status: DISCONTINUED | OUTPATIENT
Start: 2022-06-29 | End: 2022-06-29

## 2022-06-29 RX ORDER — METHYLPREDNISOLONE SODIUM SUCCINATE 125 MG/2ML
125 INJECTION, POWDER, LYOPHILIZED, FOR SOLUTION INTRAMUSCULAR; INTRAVENOUS EVERY 8 HOURS
Status: DISCONTINUED | OUTPATIENT
Start: 2022-06-29 | End: 2022-06-29

## 2022-06-29 RX ORDER — NALOXONE HYDROCHLORIDE 0.4 MG/ML
0.2 INJECTION, SOLUTION INTRAMUSCULAR; INTRAVENOUS; SUBCUTANEOUS
Status: ACTIVE | OUTPATIENT
Start: 2022-06-29 | End: 2022-06-30

## 2022-06-29 RX ORDER — ALBUMIN, HUMAN INJ 5% 5 %
500-1000 SOLUTION INTRAVENOUS ONCE
Status: COMPLETED | OUTPATIENT
Start: 2022-06-29 | End: 2022-06-29

## 2022-06-29 RX ORDER — PROPOFOL 10 MG/ML
5-75 INJECTION, EMULSION INTRAVENOUS CONTINUOUS
Status: DISCONTINUED | OUTPATIENT
Start: 2022-06-29 | End: 2022-06-30

## 2022-06-29 RX ORDER — NITROGLYCERIN 10 MG/100ML
INJECTION INTRAVENOUS PRN
Status: DISCONTINUED | OUTPATIENT
Start: 2022-06-29 | End: 2022-06-29

## 2022-06-29 RX ORDER — ACETAMINOPHEN 325 MG/1
975 TABLET ORAL EVERY 8 HOURS
Status: DISCONTINUED | OUTPATIENT
Start: 2022-06-29 | End: 2022-07-01

## 2022-06-29 RX ORDER — ACETYLCYSTEINE 200 MG/ML
2 SOLUTION ORAL; RESPIRATORY (INHALATION) 4 TIMES DAILY
Status: DISCONTINUED | OUTPATIENT
Start: 2022-06-29 | End: 2022-07-28

## 2022-06-29 RX ORDER — SULFAMETHOXAZOLE AND TRIMETHOPRIM 200; 40 MG/5ML; MG/5ML
10 SUSPENSION ORAL DAILY
Status: DISCONTINUED | OUTPATIENT
Start: 2022-07-06 | End: 2022-07-01

## 2022-06-29 RX ORDER — PROPOFOL 10 MG/ML
INJECTION, EMULSION INTRAVENOUS CONTINUOUS PRN
Status: DISCONTINUED | OUTPATIENT
Start: 2022-06-29 | End: 2022-06-29

## 2022-06-29 RX ORDER — ONDANSETRON 4 MG/1
4 TABLET, ORALLY DISINTEGRATING ORAL EVERY 6 HOURS PRN
Status: DISCONTINUED | OUTPATIENT
Start: 2022-06-29 | End: 2022-08-17 | Stop reason: HOSPADM

## 2022-06-29 RX ORDER — FENTANYL CITRATE 50 UG/ML
25-50 INJECTION, SOLUTION INTRAMUSCULAR; INTRAVENOUS
Status: ACTIVE | OUTPATIENT
Start: 2022-06-29 | End: 2022-06-30

## 2022-06-29 RX ORDER — NALOXONE HYDROCHLORIDE 0.4 MG/ML
0.2 INJECTION, SOLUTION INTRAMUSCULAR; INTRAVENOUS; SUBCUTANEOUS
Status: DISCONTINUED | OUTPATIENT
Start: 2022-06-29 | End: 2022-08-15

## 2022-06-29 RX ORDER — DEXTROSE MONOHYDRATE 100 MG/ML
INJECTION, SOLUTION INTRAVENOUS CONTINUOUS PRN
Status: DISCONTINUED | OUTPATIENT
Start: 2022-06-29 | End: 2022-07-03

## 2022-06-29 RX ORDER — LEVALBUTEROL INHALATION SOLUTION 1.25 MG/3ML
1.25 SOLUTION RESPIRATORY (INHALATION) 4 TIMES DAILY
Status: DISCONTINUED | OUTPATIENT
Start: 2022-06-29 | End: 2022-07-28

## 2022-06-29 RX ORDER — AMINO AC/PROTEIN HYDR/WHEY PRO 10G-100/30
1 LIQUID (ML) ORAL DAILY
Status: DISCONTINUED | OUTPATIENT
Start: 2022-06-29 | End: 2022-08-17 | Stop reason: HOSPADM

## 2022-06-29 RX ORDER — CALCIUM GLUCONATE 20 MG/ML
2 INJECTION, SOLUTION INTRAVENOUS ONCE
Status: COMPLETED | OUTPATIENT
Start: 2022-06-29 | End: 2022-06-29

## 2022-06-29 RX ORDER — METHYLPREDNISOLONE SODIUM SUCCINATE 125 MG/2ML
125 INJECTION, POWDER, LYOPHILIZED, FOR SOLUTION INTRAMUSCULAR; INTRAVENOUS EVERY 8 HOURS
Status: COMPLETED | OUTPATIENT
Start: 2022-06-29 | End: 2022-06-29

## 2022-06-29 RX ORDER — NOREPINEPHRINE BITARTRATE 0.06 MG/ML
.01-.6 INJECTION, SOLUTION INTRAVENOUS CONTINUOUS
Status: DISCONTINUED | OUTPATIENT
Start: 2022-06-29 | End: 2022-07-11

## 2022-06-29 RX ORDER — BISACODYL 10 MG
10 SUPPOSITORY, RECTAL RECTAL DAILY PRN
Status: DISCONTINUED | OUTPATIENT
Start: 2022-06-29 | End: 2022-08-05

## 2022-06-29 RX ORDER — NICOTINE POLACRILEX 4 MG
15-30 LOZENGE BUCCAL
Status: DISCONTINUED | OUTPATIENT
Start: 2022-06-29 | End: 2022-07-06

## 2022-06-29 RX ORDER — OXYCODONE HYDROCHLORIDE 10 MG/1
10 TABLET ORAL EVERY 4 HOURS PRN
Status: DISCONTINUED | OUTPATIENT
Start: 2022-06-29 | End: 2022-07-07

## 2022-06-29 RX ORDER — OXYCODONE HYDROCHLORIDE 5 MG/1
5 TABLET ORAL EVERY 4 HOURS PRN
Status: DISCONTINUED | OUTPATIENT
Start: 2022-06-29 | End: 2022-07-13

## 2022-06-29 RX ORDER — LIDOCAINE HYDROCHLORIDE 20 MG/ML
5 SOLUTION OROPHARYNGEAL ONCE
Status: DISCONTINUED | OUTPATIENT
Start: 2022-06-29 | End: 2022-06-30

## 2022-06-29 RX ORDER — POLYETHYLENE GLYCOL 3350 17 G/17G
17 POWDER, FOR SOLUTION ORAL DAILY
Status: DISCONTINUED | OUTPATIENT
Start: 2022-06-29 | End: 2022-07-01

## 2022-06-29 RX ORDER — MYCOPHENOLATE MOFETIL 250 MG/1
1500 CAPSULE ORAL 2 TIMES DAILY
Status: DISCONTINUED | OUTPATIENT
Start: 2022-06-29 | End: 2022-07-10

## 2022-06-29 RX ORDER — FLUMAZENIL 0.1 MG/ML
0.2 INJECTION, SOLUTION INTRAVENOUS
Status: ACTIVE | OUTPATIENT
Start: 2022-06-29 | End: 2022-06-30

## 2022-06-29 RX ORDER — DEXTROSE MONOHYDRATE 25 G/50ML
25-50 INJECTION, SOLUTION INTRAVENOUS
Status: DISCONTINUED | OUTPATIENT
Start: 2022-06-29 | End: 2022-07-06

## 2022-06-29 RX ORDER — PROCHLORPERAZINE MALEATE 5 MG
10 TABLET ORAL EVERY 6 HOURS PRN
Status: DISCONTINUED | OUTPATIENT
Start: 2022-06-29 | End: 2022-08-15

## 2022-06-29 RX ORDER — SULFAMETHOXAZOLE AND TRIMETHOPRIM 400; 80 MG/1; MG/1
1 TABLET ORAL DAILY
Status: DISCONTINUED | OUTPATIENT
Start: 2022-07-06 | End: 2022-07-01

## 2022-06-29 RX ORDER — MYCOPHENOLATE MOFETIL 200 MG/ML
1500 POWDER, FOR SUSPENSION ORAL 2 TIMES DAILY
Status: DISCONTINUED | OUTPATIENT
Start: 2022-06-29 | End: 2022-07-10

## 2022-06-29 RX ORDER — GUAIFENESIN 600 MG/1
15 TABLET, EXTENDED RELEASE ORAL DAILY
Status: DISCONTINUED | OUTPATIENT
Start: 2022-06-29 | End: 2022-07-13

## 2022-06-29 RX ORDER — HEPARIN SODIUM 5000 [USP'U]/.5ML
5000 INJECTION, SOLUTION INTRAVENOUS; SUBCUTANEOUS EVERY 8 HOURS SCHEDULED
Status: DISCONTINUED | OUTPATIENT
Start: 2022-06-29 | End: 2022-07-02

## 2022-06-29 RX ORDER — VANCOMYCIN HYDROCHLORIDE 1 G/200ML
1000 INJECTION, SOLUTION INTRAVENOUS EVERY 12 HOURS
Status: DISCONTINUED | OUTPATIENT
Start: 2022-06-29 | End: 2022-06-30

## 2022-06-29 RX ORDER — AMOXICILLIN 250 MG
1 CAPSULE ORAL 2 TIMES DAILY
Status: DISCONTINUED | OUTPATIENT
Start: 2022-06-29 | End: 2022-07-01

## 2022-06-29 RX ORDER — PREDNISOLONE SODIUM PHOSPHATE 15 MG/5ML
17.5 SOLUTION ORAL 2 TIMES DAILY
Status: DISCONTINUED | OUTPATIENT
Start: 2022-06-30 | End: 2022-07-06

## 2022-06-29 RX ORDER — LIDOCAINE 40 MG/G
CREAM TOPICAL
Status: DISCONTINUED | OUTPATIENT
Start: 2022-06-29 | End: 2022-07-25

## 2022-06-29 RX ORDER — HYDROMORPHONE HCL IN WATER/PF 6 MG/30 ML
0.4 PATIENT CONTROLLED ANALGESIA SYRINGE INTRAVENOUS
Status: DISCONTINUED | OUTPATIENT
Start: 2022-06-29 | End: 2022-07-08

## 2022-06-29 RX ORDER — POTASSIUM CHLORIDE 29.8 MG/ML
INJECTION INTRAVENOUS PRN
Status: DISCONTINUED | OUTPATIENT
Start: 2022-06-29 | End: 2022-06-29

## 2022-06-29 RX ORDER — PROTAMINE SULFATE 10 MG/ML
INJECTION, SOLUTION INTRAVENOUS PRN
Status: DISCONTINUED | OUTPATIENT
Start: 2022-06-28 | End: 2022-06-29

## 2022-06-29 RX ORDER — GABAPENTIN 100 MG/1
100 CAPSULE ORAL 3 TIMES DAILY
Status: DISCONTINUED | OUTPATIENT
Start: 2022-06-29 | End: 2022-07-05

## 2022-06-29 RX ORDER — DEXTROSE MONOHYDRATE 100 MG/ML
INJECTION, SOLUTION INTRAVENOUS CONTINUOUS PRN
Status: DISCONTINUED | OUTPATIENT
Start: 2022-06-29 | End: 2022-08-17 | Stop reason: HOSPADM

## 2022-06-29 RX ORDER — METOCLOPRAMIDE HYDROCHLORIDE 5 MG/ML
10 INJECTION INTRAMUSCULAR; INTRAVENOUS
Status: ACTIVE | OUTPATIENT
Start: 2022-06-29 | End: 2022-06-30

## 2022-06-29 RX ORDER — BUPIVACAINE HYDROCHLORIDE 2.5 MG/ML
INJECTION, SOLUTION EPIDURAL; INFILTRATION; INTRACAUDAL
Status: DISCONTINUED | OUTPATIENT
Start: 2022-06-29 | End: 2022-06-29

## 2022-06-29 RX ORDER — HYDROMORPHONE HCL IN WATER/PF 6 MG/30 ML
0.2 PATIENT CONTROLLED ANALGESIA SYRINGE INTRAVENOUS
Status: DISCONTINUED | OUTPATIENT
Start: 2022-06-29 | End: 2022-07-08

## 2022-06-29 RX ORDER — CALCIUM CHLORIDE 100 MG/ML
INJECTION INTRAVENOUS; INTRAVENTRICULAR PRN
Status: DISCONTINUED | OUTPATIENT
Start: 2022-06-29 | End: 2022-06-29

## 2022-06-29 RX ORDER — METOCLOPRAMIDE HYDROCHLORIDE 5 MG/ML
10 INJECTION INTRAMUSCULAR; INTRAVENOUS ONCE
Status: COMPLETED | OUTPATIENT
Start: 2022-06-29 | End: 2022-06-29

## 2022-06-29 RX ORDER — PREDNISOLONE SODIUM PHOSPHATE 15 MG/5ML
0.25 SOLUTION ORAL 2 TIMES DAILY
Status: DISCONTINUED | OUTPATIENT
Start: 2022-06-30 | End: 2022-06-29

## 2022-06-29 RX ORDER — VALGANCICLOVIR HYDROCHLORIDE 50 MG/ML
900 POWDER, FOR SOLUTION ORAL DAILY
Status: DISCONTINUED | OUTPATIENT
Start: 2022-07-06 | End: 2022-07-05

## 2022-06-29 RX ADMIN — PANTOPRAZOLE SODIUM 40 MG: 40 INJECTION, POWDER, FOR SOLUTION INTRAVENOUS at 08:30

## 2022-06-29 RX ADMIN — ACETYLCYSTEINE 2 ML: 200 SOLUTION ORAL; RESPIRATORY (INHALATION) at 11:58

## 2022-06-29 RX ADMIN — PROPOFOL 60 MCG/KG/MIN: 10 INJECTION, EMULSION INTRAVENOUS at 01:38

## 2022-06-29 RX ADMIN — METOCLOPRAMIDE HYDROCHLORIDE 10 MG: 5 INJECTION INTRAMUSCULAR; INTRAVENOUS at 15:06

## 2022-06-29 RX ADMIN — Medication 100 MCG/HR: at 21:13

## 2022-06-29 RX ADMIN — SUGAMMADEX 200 MG: 100 INJECTION, SOLUTION INTRAVENOUS at 02:41

## 2022-06-29 RX ADMIN — NITROGLYCERIN 25 MCG: 10 INJECTION INTRAVENOUS at 00:01

## 2022-06-29 RX ADMIN — TACROLIMUS 1 MG: 1 CAPSULE ORAL at 08:33

## 2022-06-29 RX ADMIN — SODIUM CHLORIDE, SODIUM GLUCONATE, SODIUM ACETATE, POTASSIUM CHLORIDE AND MAGNESIUM CHLORIDE: 526; 502; 368; 37; 30 INJECTION, SOLUTION INTRAVENOUS at 00:10

## 2022-06-29 RX ADMIN — CALCIUM GLUCONATE 2 G: 20 INJECTION, SOLUTION INTRAVENOUS at 11:04

## 2022-06-29 RX ADMIN — CALCIUM CHLORIDE 1 G: 100 INJECTION INTRAVENOUS; INTRAVENTRICULAR at 00:40

## 2022-06-29 RX ADMIN — METHYLPREDNISOLONE SODIUM SUCCINATE 125 MG: 125 INJECTION, POWDER, FOR SOLUTION INTRAMUSCULAR; INTRAVENOUS at 11:17

## 2022-06-29 RX ADMIN — LEVALBUTEROL HYDROCHLORIDE 1.25 MG: 1.25 SOLUTION RESPIRATORY (INHALATION) at 11:58

## 2022-06-29 RX ADMIN — POTASSIUM PHOSPHATE, MONOBASIC AND POTASSIUM PHOSPHATE, DIBASIC 9 MMOL: 224; 236 INJECTION, SOLUTION, CONCENTRATE INTRAVENOUS at 18:23

## 2022-06-29 RX ADMIN — FENTANYL CITRATE 100 MCG: 50 INJECTION, SOLUTION INTRAMUSCULAR; INTRAVENOUS at 00:05

## 2022-06-29 RX ADMIN — VANCOMYCIN HYDROCHLORIDE 1000 MG: 1 INJECTION, SOLUTION INTRAVENOUS at 21:12

## 2022-06-29 RX ADMIN — CEFTAZIDIME 1 G: 1 INJECTION, POWDER, FOR SOLUTION INTRAMUSCULAR; INTRAVENOUS at 00:12

## 2022-06-29 RX ADMIN — PROTHROMBIN, COAGULATION FACTOR VII HUMAN, COAGULATION FACTOR IX HUMAN, COAGULATION FACTOR X HUMAN, PROTEIN C, PROTEIN S HUMAN, AND WATER 1093 UNITS: KIT at 01:01

## 2022-06-29 RX ADMIN — GABAPENTIN 100 MG: 100 CAPSULE ORAL at 20:48

## 2022-06-29 RX ADMIN — Medication 500 MG: at 15:47

## 2022-06-29 RX ADMIN — METHYLPREDNISOLONE SODIUM SUCCINATE 125 MG: 125 INJECTION, POWDER, FOR SOLUTION INTRAMUSCULAR; INTRAVENOUS at 18:34

## 2022-06-29 RX ADMIN — SODIUM CHLORIDE, POTASSIUM CHLORIDE, SODIUM LACTATE AND CALCIUM CHLORIDE 500 ML: 600; 310; 30; 20 INJECTION, SOLUTION INTRAVENOUS at 06:59

## 2022-06-29 RX ADMIN — SODIUM CHLORIDE, SODIUM GLUCONATE, SODIUM ACETATE, POTASSIUM CHLORIDE AND MAGNESIUM CHLORIDE: 526; 502; 368; 37; 30 INJECTION, SOLUTION INTRAVENOUS at 01:00

## 2022-06-29 RX ADMIN — GABAPENTIN 100 MG: 100 CAPSULE ORAL at 08:32

## 2022-06-29 RX ADMIN — Medication 50 MCG/HR: at 03:19

## 2022-06-29 RX ADMIN — FENTANYL CITRATE 100 MCG: 50 INJECTION, SOLUTION INTRAMUSCULAR; INTRAVENOUS at 02:40

## 2022-06-29 RX ADMIN — HUMAN INSULIN 5.5 UNITS/HR: 100 INJECTION, SOLUTION SUBCUTANEOUS at 15:44

## 2022-06-29 RX ADMIN — PROPOFOL 30 MCG/KG/MIN: 10 INJECTION, EMULSION INTRAVENOUS at 15:47

## 2022-06-29 RX ADMIN — DEXMEDETOMIDINE HYDROCHLORIDE 0.4 MCG/KG/HR: 400 INJECTION INTRAVENOUS at 20:52

## 2022-06-29 RX ADMIN — ACETYLCYSTEINE 2 ML: 200 SOLUTION ORAL; RESPIRATORY (INHALATION) at 16:32

## 2022-06-29 RX ADMIN — CEFTAZIDIME 2 G: 2 INJECTION, POWDER, FOR SOLUTION INTRAVENOUS at 09:05

## 2022-06-29 RX ADMIN — PROPOFOL 20 MCG/KG/MIN: 10 INJECTION, EMULSION INTRAVENOUS at 03:56

## 2022-06-29 RX ADMIN — Medication 1 PACKET: at 14:30

## 2022-06-29 RX ADMIN — NYSTATIN 1000000 UNITS: 100000 SUSPENSION ORAL at 16:29

## 2022-06-29 RX ADMIN — ACETYLCYSTEINE 2 ML: 200 SOLUTION ORAL; RESPIRATORY (INHALATION) at 08:39

## 2022-06-29 RX ADMIN — LEVALBUTEROL HYDROCHLORIDE 1.25 MG: 1.25 SOLUTION RESPIRATORY (INHALATION) at 20:15

## 2022-06-29 RX ADMIN — GABAPENTIN 100 MG: 100 CAPSULE ORAL at 15:06

## 2022-06-29 RX ADMIN — SENNOSIDES AND DOCUSATE SODIUM 1 TABLET: 8.6; 5 TABLET ORAL at 08:30

## 2022-06-29 RX ADMIN — OXYCODONE HYDROCHLORIDE 5 MG: 5 TABLET ORAL at 14:28

## 2022-06-29 RX ADMIN — EPINEPHRINE 0.12 MCG/KG/MIN: 1 INJECTION INTRAMUSCULAR; INTRAVENOUS; SUBCUTANEOUS at 10:36

## 2022-06-29 RX ADMIN — ACETAMINOPHEN 975 MG: 325 TABLET, FILM COATED ORAL at 14:28

## 2022-06-29 RX ADMIN — EPINEPHRINE 10 MCG: 1 INJECTION INTRAMUSCULAR; INTRAVENOUS; SUBCUTANEOUS at 01:21

## 2022-06-29 RX ADMIN — POTASSIUM CHLORIDE 20 MEQ: 29.8 INJECTION, SOLUTION INTRAVENOUS at 00:40

## 2022-06-29 RX ADMIN — Medication 10 MG: at 02:00

## 2022-06-29 RX ADMIN — Medication 500 MG: at 13:33

## 2022-06-29 RX ADMIN — POTASSIUM PHOSPHATE, MONOBASIC POTASSIUM PHOSPHATE, DIBASIC 15 MMOL: 224; 236 INJECTION, SOLUTION, CONCENTRATE INTRAVENOUS at 06:41

## 2022-06-29 RX ADMIN — VANCOMYCIN HYDROCHLORIDE 1000 MG: 1 INJECTION, SOLUTION INTRAVENOUS at 09:55

## 2022-06-29 RX ADMIN — ACETYLCYSTEINE 2 ML: 200 SOLUTION ORAL; RESPIRATORY (INHALATION) at 20:15

## 2022-06-29 RX ADMIN — METHYLPREDNISOLONE SODIUM SUCCINATE 125 MG: 125 INJECTION, POWDER, FOR SOLUTION INTRAMUSCULAR; INTRAVENOUS at 04:13

## 2022-06-29 RX ADMIN — NYSTATIN 1000000 UNITS: 100000 SUSPENSION ORAL at 12:34

## 2022-06-29 RX ADMIN — SODIUM CHLORIDE, POTASSIUM CHLORIDE, SODIUM LACTATE AND CALCIUM CHLORIDE 250 ML: 600; 310; 30; 20 INJECTION, SOLUTION INTRAVENOUS at 17:53

## 2022-06-29 RX ADMIN — MYCOPHENOLATE MOFETIL 1500 MG: 200 POWDER, FOR SUSPENSION ORAL at 20:48

## 2022-06-29 RX ADMIN — MULTIVITAMIN 15 ML: LIQUID ORAL at 14:32

## 2022-06-29 RX ADMIN — FENTANYL CITRATE 50 MCG: 50 INJECTION, SOLUTION INTRAMUSCULAR; INTRAVENOUS at 12:13

## 2022-06-29 RX ADMIN — CEFTAZIDIME 2 G: 2 INJECTION, POWDER, FOR SOLUTION INTRAVENOUS at 16:50

## 2022-06-29 RX ADMIN — LEVALBUTEROL HYDROCHLORIDE 1.25 MG: 1.25 SOLUTION RESPIRATORY (INHALATION) at 08:39

## 2022-06-29 RX ADMIN — SODIUM CHLORIDE, POTASSIUM CHLORIDE, SODIUM LACTATE AND CALCIUM CHLORIDE 500 ML: 600; 310; 30; 20 INJECTION, SOLUTION INTRAVENOUS at 11:02

## 2022-06-29 RX ADMIN — MYCOPHENOLATE MOFETIL 1500 MG: 200 POWDER, FOR SUSPENSION ORAL at 08:32

## 2022-06-29 RX ADMIN — ALBUMIN HUMAN 1000 ML: 50 SOLUTION INTRAVENOUS at 03:00

## 2022-06-29 RX ADMIN — CEFTAZIDIME 2 G: 2 INJECTION, POWDER, FOR SOLUTION INTRAVENOUS at 23:38

## 2022-06-29 RX ADMIN — NITROGLYCERIN 25 MCG: 10 INJECTION INTRAVENOUS at 00:05

## 2022-06-29 RX ADMIN — HEPARIN SODIUM 5000 UNITS: 5000 INJECTION, SOLUTION INTRAVENOUS; SUBCUTANEOUS at 21:07

## 2022-06-29 RX ADMIN — BUPIVACAINE HYDROCHLORIDE 20 ML: 2.5 INJECTION, SOLUTION EPIDURAL; INFILTRATION; INTRACAUDAL; PERINEURAL at 10:25

## 2022-06-29 RX ADMIN — ACETAMINOPHEN 975 MG: 325 TABLET, FILM COATED ORAL at 03:56

## 2022-06-29 RX ADMIN — ALBUMIN HUMAN 1000 ML: 0.05 INJECTION, SOLUTION INTRAVENOUS at 03:00

## 2022-06-29 RX ADMIN — ACETAMINOPHEN 975 MG: 325 TABLET, FILM COATED ORAL at 21:00

## 2022-06-29 RX ADMIN — SODIUM CHLORIDE, POTASSIUM CHLORIDE, SODIUM LACTATE AND CALCIUM CHLORIDE: 600; 310; 30; 20 INJECTION, SOLUTION INTRAVENOUS at 01:45

## 2022-06-29 RX ADMIN — TACROLIMUS 1 MG: 1 CAPSULE ORAL at 18:02

## 2022-06-29 RX ADMIN — LEVALBUTEROL HYDROCHLORIDE 1.25 MG: 1.25 SOLUTION RESPIRATORY (INHALATION) at 16:32

## 2022-06-29 RX ADMIN — NYSTATIN 1000000 UNITS: 100000 SUSPENSION ORAL at 07:38

## 2022-06-29 RX ADMIN — NYSTATIN 1000000 UNITS: 100000 SUSPENSION ORAL at 20:47

## 2022-06-29 RX ADMIN — POLYETHYLENE GLYCOL 3350 17 G: 17 POWDER, FOR SOLUTION ORAL at 08:32

## 2022-06-29 RX ADMIN — ALBUMIN (HUMAN): 12.5 SOLUTION INTRAVENOUS at 01:34

## 2022-06-29 ASSESSMENT — ACTIVITIES OF DAILY LIVING (ADL)
ADLS_ACUITY_SCORE: 29
ADLS_ACUITY_SCORE: 26

## 2022-06-29 NOTE — ADDENDUM NOTE
Addendum  created 06/29/22 1051 by Rosa Thompson MD    Child order released for a procedure order, Clinical Note Signed, Intraprocedure Blocks edited, LDA created via procedure documentation, Order list changed, Order sets accessed, Result filed, SmartForm saved

## 2022-06-29 NOTE — PROGRESS NOTES
Pt received bilateral lung transplant on 06/28/2022, donor ID [id number], removed from [facility] transplant waitlist.

## 2022-06-29 NOTE — PROGRESS NOTES
Final Crossmatch   Final crossmatch results for [facility] ID [id number] and recipient sample date 06/28/2022 was both T cell and B cell, allo and auto negative.  PRA pending from sample date 06/28/2022.

## 2022-06-29 NOTE — ANESTHESIA CARE TRANSFER NOTE
Patient: Sofie Rodriguez    Procedure: Procedure(s):  TRANSPLANT, LUNG, RECIPIENT, BILATERAL, Clamshell Incision, Cardiopulmonary Bypass, Bronchoscopy       Diagnosis: COPD (chronic obstructive pulmonary disease) (H) [J44.9]  Diagnosis Additional Information: No value filed.    Anesthesia Type:   General     Note:    Oropharynx: endotracheal tube in place  Level of Consciousness: iatrogenic sedation      Independent Airway: airway patency not satisfactory and stable  Dentition: dentition unchanged  Vital Signs Stable: post-procedure vital signs reviewed and stable  Report to RN Given: handoff report given  Patient transferred to: ICU    ICU Handoff: Call for PAUSE to initiate/utilize ICU HANDOFF, Identified Patient, Identified Responsible Provider, Reviewed the Pertinent Medical History, Discussed Surgical Course, Reviewed Intra-OP Anesthesia Management and Issues during Anesthesia, Set Expectations for Post Procedure Period and Allowed Opportunity for Questions and Acknowledgement of Understanding      Vitals:  Vitals Value Taken Time   BP     Temp     Pulse 103 06/29/22 0249   Resp     SpO2 100 % 06/29/22 0249   Vitals shown include unvalidated device data.    Electronically Signed By: Amalia Duckworth MD  June 29, 2022  2:51 AM

## 2022-06-29 NOTE — PROGRESS NOTES
"Bronchoscopy Risk Assessment Guidelines      A. Patient symptoms to consider when assessing pulmonary TB risk are:    I. Cough greater than 3 weeks; and fever, hemoptysis, pleuritic chest    pain, weight loss greater than 10 lbs, night sweats, fatigue, infiltrates on    upper lobes or superior segments of lower lobes, cavitation on chest    x-ray.   B. Patient risk factors to consider when assessing pulmonary TB risk are:    I. Exposure to known TB case, foreign-born persons (within 5 years of    arrival to [place]), residence in a crowded setting (correctional facility,     long-term care center, etc.), persons with HIV or immunosuppression.    Patients with symptoms and risk factors should generally be considered \"suspect risk\" and bronchoscopies should be performed in airborne precautions.    This patient has NO KNOWN RISK of Tuberculosis (proceed with bronchoscopy)    Specimens sent: yes  Complications: None  Scope used: #[id number] Adult  Attending Physician: Dr. Golden Pires, RT on 6/29/2022 at 1:40 PM  "

## 2022-06-29 NOTE — PROGRESS NOTES
CV ICU PROGRESS NOTE  June 29, 2022      CO-MORBIDITIES:   HTN, HFpEF, COPD, HCV    ASSESSMENT: Sofie Rodriguez is a 62 year old female with PMH of oxygen-dependent COPD, HFpEF, HTN, HCV, and osteopenia who underwent bilateral lung transplant on 6/28/22 with Dr. Sunshine.    TODAY'S PROGRESS:   - Plan to wean off vent and extubate tomorrow  - Bronchoscopy today  - Discontinued fentanyl and propofol drips, scheduled pain regimen, erector spinae catheters placed  - Began heparin subQ  - Soft pressures, required multiple fluid boluses    PLAN:  Neuro/ pain/ sedation:  Acute postoperative pain  - Monitor neurological status. Notify the MD for any acute changes in exam.  - Scheduled: acetaminophen, robaxin.   PRN: oxycodone, dilaudid  - Erector spinae catheters placed 6/29  - Sedation: propofol gtt --> discontinued     Pulmonary:  #Postoperative ventilatory support  #Bilateral Lung Transplant  #Hx COPD  - Titrate FiO2 for SpO2 >92%  - Intubated, ventilated --> Plan to extubate 6/30  - Pulmonary hygiene: Incentive spirometer every 15- 30 minutes when awake, flutter valve, C&DB  - Basiliximab POD #0 and #4, methylprednisolone, mycophenolate, tacrolimus  - Valgancyclovir  - Bronch this AM  - Small lungs to chest - 30-40 degree head up     Cardiovascular:  #Hx HTN  #Hx HFpEF  - Monitor hemodynamic status.   - Goal MAP >65  - Epi gtt; wean as tolerated     GI care/ Nutrition:   - NPO  - NJ placement   - PPI: protonix  - Continue bowel regimen: miralax; PRN moM, bisacodyl supp    Renal/ Fluid Balance/ Electrolytes:   BL creat appears to be ~ 0.85  - Strict I/O, daily weights  - Avoid/limit nephrotoxins as able  - Replete lytes PRN per protocol     Endocrine:    Stress induced hyperglycemia  - Insulin gtt   - Goal BG <180 for optimal healing     ID/ Antibiotics:  Stress-induced leukocytosis  - Continue to monitor fever curve, WBC and inflammatory markers as appropriate  - Prophylaxis: POD #8-90 nystatin, TMP/SMX,  valganciclovir  - Post-op abx: Vancomycin, ceftazadime 48 hrs     Heme:     Acute blood loss anemia  Acute blood loss thrombocytopenia  No s/sx active bleeding  - Continue to monitor     MSK/ Skin:  Sternotomy  Surgical Incision  - Sternal precautions  - Postoperative incision management per protocol  - PT/OT/CR     Prophylaxis:    - Mechanical prophylaxis for DVT  - Chemical DVT prophylaxis - heparin subQ  - PPI      Lines/ tubes/ drains:  - Arterial Line, ETT, CTs, PA catheter, RIJ, montgomery, NJ, PIV x2  - 4 Pleural, 1 med     Disposition:  - CVICU    Patient seen, findings and plan discussed with CV ICU staff, Dr. Leon.    -----------------------------------  Cherie Clifton, MS4  [facility] CVICU    Resident/Fellow Attestation   I, Miguel Angel Hunter MD, was present with the medical/EMILY student who participated in the service and in the documentation of the note.  I have verified the history and personally performed the physical exam and medical decision making.  I agree with the assessment and plan of care as documented in the note.      Miguel Angel Hunter MD  PGY3  Date of Service (when I saw the patient): 06/29/22      ====================================    SUBJECTIVE:   Doing well post-op, woke and followed commands intermittently.    OBJECTIVE:   1. VITAL SIGNS:   Temp:  [93.2  F (34  C)-99.8  F (37.7  C)] 96.4  F (35.8  C)  Pulse:  [] 104  Resp:  [17-26] 18  BP: (141-147)/(72-98) 141/94  MAP:  [61 mmHg-79 mmHg] 66 mmHg  Arterial Line BP: ()/(47-65) 115/48  FiO2 (%):  [40 %-100 %] 40 %  SpO2:  [94 %-100 %] 100 %  Vent Mode: CMV/AC  (Continuous Mandatory Ventilation/ Assist Control)  FiO2 (%): 40 %  Resp Rate (Set): 14 breaths/min  Tidal Volume (Set, mL): 390 mL  PEEP (cm H2O): 10 cmH2O  Resp: 18      2. INTAKE/ OUTPUT:   I/O last 3 completed shifts:  In: 6144.48 [I.V.:4976.48; Other:500; IV Piggyback:150]  Out: 2305 [Urine:1575; Chest Tube:730]    3. PHYSICAL EXAMINATION:   General: Lying sedated in  bed  Neuro: Awakens and follows commands intermittently    4. INVESTIGATIONS:   Arterial Blood Gases   Recent Labs   Lab 06/29/22  0535 06/29/22  0248 06/29/22  0141 06/29/22  0106   PH 7.41 7.42 7.44 7.47*   PCO2 40 41 39 35   PO2 163* 446* 446* 450*   HCO3 25 26 27 25     Complete Blood Count   Recent Labs   Lab 06/29/22  0536 06/29/22  0248 06/29/22  0141 06/29/22  0106 06/29/22  0011 06/29/22  0007 06/28/22 2016 06/28/22  1258   WBC 16.2* 12.6*  --   --   --  9.6  --  6.8   HGB 7.6* 9.0* 9.1* 8.5*   < > 6.8*   < > 13.4   * 124*  --   --   --  133*  --  255    < > = values in this interval not displayed.     Basic Metabolic Panel  Recent Labs   Lab 06/29/22  0548 06/29/22  0542 06/29/22  0411 06/29/22  0250 06/29/22  0248 06/29/22  0141 06/29/22  0106 06/29/22  0032 06/28/22  1745 06/28/22  1258   NA  --   --   --   --  139 140 139 140   < > 139   POTASSIUM  --   --   --   --  4.0 3.6 4.1 3.2*   < > 4.2   CHLORIDE  --   --   --   --  100  --   --   --   --  93*   CO2  --   --   --   --  24  --   --   --   --  35*   BUN  --   --   --   --  16.5  --   --   --   --  14.2   CR  --   --   --   --  0.94  --   --   --   --  0.85   * 254* 157* 126* 159* 115* 123* 139*   < > 96    < > = values in this interval not displayed.     Liver Function Tests  Recent Labs   Lab 06/29/22  0248 06/29/22  0007 06/28/22  1258   *  --  30   ALT 33  --  16   ALKPHOS 36  --  86   BILITOTAL 0.9  --  0.4   ALBUMIN 2.3*  --  5.3*   INR 1.33* 1.60* 0.91     Pancreatic Enzymes  No lab results found in last 7 days.  Coagulation Profile  Recent Labs   Lab 06/29/22  0248 06/29/22  0007 06/28/22  1258   INR 1.33* 1.60* 0.91   PTT 52* 32 29         5. RADIOLOGY:   Recent Results (from the past 24 hour(s))   XR Chest 2 Views    Narrative    Exam: XR CHEST 2 VW, 6/28/2022 2:09 PM    Comparison: CT chest 4/4/2022, chest x-ray 6/14/2021    History: pre-op lung transplant    Findings:  PA and lateral views of the chest.  Trachea is  midline. The heart size  is within normal limits. Atherosclerotic ectasia of the aortic arch.  Hyperinflated lungs with emphysematous changes. No acute airspace  disease. There is no pneumothorax or pleural effusion. The upper  abdomen is unremarkable. No acute osseous abnormality. Flattened  hemidiaphragms on the lateral view.      Impression    Impression:   Hyperinflated lungs with emphysematous changes. No acute airspace  disease.     I have personally reviewed the examination and initial interpretation  and I agree with the findings.    ALVINA HARRY MD         SYSTEM ID:  [id number]   XR Chest Port 1 View    Impression    RESIDENT PRELIMINARY INTERPRETATION  IMPRESSION:   1. Postoperative chest with mild perihilar and bibasilar atelectasis.  2. Endotracheal tube tip approximately 6 cm above the nabil.  3. Right IJ [place] catheter tip in the right pulmonary artery.       =========================================

## 2022-06-29 NOTE — BRIEF OP NOTE
[facility]    Brief Operative Note    Pre-operative diagnosis: COPD (chronic obstructive pulmonary disease) (H) [J44.9]  Post-operative diagnosis Same as pre-operative diagnosis    Procedure: Procedure(s):  TRANSPLANT, LUNG, RECIPIENT, BILATERAL, Clamshell Incision, Cardiopulmonary Bypass, Bronchoscopy  Surgeon: Surgeon(s) and Role:     * Sue Sunshine MD - Primary     * Loida Polanco MD - Fellow - Assisting     * Corey Daigle MD - Fellow - Assisting     * Eliecer Adam MD - Fellow - Assisting  Anesthesia: General   Estimated Blood Loss: 1L    Drains: Mediastinal angelito, bilateral angled/straight  Specimens:   ID Type Source Tests Collected by Time Destination   1 : Right Native Lung Tissue Lung, Right SURGICAL PATHOLOGY EXAM Sue Sunshine MD 6/28/2022 10:24 PM    2 : Left Native Lung Tissue Lung, Left SURGICAL PATHOLOGY EXAM Sue Sunshine MD 6/28/2022 10:24 PM    A : Donor Lung Bronchial Washings Washings Lung, Right AFB CULTURE AND STAIN NON BLOOD, GRAM STAIN, FUNGAL OR YEAST CULTURE ROUTINE, RESPIRATORY AEROBIC BACTERIAL CULTURE Sue Sunshine MD 6/28/2022  9:13 PM    B : Donor Lung Viral Culture Washings Lung, Right RESPIRATORY PANEL PCR Sue Sunshine MD 6/28/2022  9:13 PM    C : Native Lung Bronchial Washings Washings Lung, Right AFB CULTURE AND STAIN NON BLOOD, GRAM STAIN, FUNGAL OR YEAST CULTURE ROUTINE, RESPIRATORY AEROBIC BACTERIAL CULTURE Sue Sunshine MD 6/28/2022  9:50 PM    D : Native Lung Viral Culture Washings Lung, Right RESPIRATORY PANEL PCR Sue Sunshine MD 6/28/2022  9:54 PM      Findings:   see op note.  Complications: None.  Implants: * No implants in log *

## 2022-06-29 NOTE — PROGRESS NOTES
"CLINICAL NUTRITION SERVICES - ASSESSMENT NOTE     Nutrition Prescription    RECOMMENDATIONS FOR MDs/PROVIDERS TO ORDER:  Daily fluid adjustments per MD    Malnutrition Status:    Patient does not meet two of the established criteria necessary for diagnosing malnutrition    Recommendations already ordered by Registered Dietitian (RD):  Once FT placement verified by AXR, recommend:   Osmolite 1.5 R[place]hawn @ goal of 45ml/hr (1080ml/day) + 1 pkt Prosource daily will provide: 1660 kcals (30 kcal/kg), 78 g PRO (1.4 g/kg), 822 ml free H20, 219 g CHO, and 0 g fiber daily.  - Initiate @ 15 ml/hr and advance by 15 ml q8hr as tolerated  - Do not start or advance until lytes (Mg++,K+) WNL and phos>2.0  - Recommend 30 ml q4hr fluid flushes for tube patency. Additional fluids and/or adjustments per MD.    - Order multivitamin/mineral (15 ml/day via FT) to help ensure micronutrient needs being met with suspected hypermetabolic demands and potential interruptions to TF infusions.  - Elevated HOB with gastric feeds     Future/Additional Recommendations:  -Monitor TF tolerance  -Monitor propofol infusion and adjust TF as needed  -Monitor labs, stools, weights      REASON FOR ASSESSMENT  Sofie Rodriguez is a/an 62 year old female assessed by the dietitian for Provider Order - Registered Dietitian to Assess and Order TF per Medical Nutrition Therapy Protocol    NUTRITION HISTORY  Unable to obtain nutrition hx as pt intubated and sedated.     CURRENT NUTRITION ORDERS  Diet: NPO    LABS  Labs reviewed - hypophosphatemia, elevated BG    MEDICATIONS  Medications reviewed - propofol at 11.8 ml/hr (312 kcal/d)    ANTHROPOMETRICS  Height: 157.5 cm (5' 2\")  Most Recent Weight: 65.7 kg (144 lb)   IBW: 50 kg  BMI: Overweight BMI 25-29.9  Weight History:   Wt Readings from Last 10 Encounters:   06/28/22 65.7 kg (144 lb 14.4 oz)   04/29/22 63.5 kg (140 lb)   11/11/21 62.6 kg (138 lb)   06/30/21 67.4 kg (148 lb 11.2 oz)   06/30/21 61.2 kg (135 lb) "   06/15/21 61.2 kg (135 lb)   03/26/21 57.8 kg (127 lb 6.4 oz)   02/03/21 54.4 kg (120 lb)     Dosing Weight: 56 kg (adjusted BW based on IBW and lowest actual BW)    ASSESSED NUTRITION NEEDS  Estimated Energy Needs: 1066-8016 kcals/day (25 - 30 kcals/kg)  Justification: Maintenance  Estimated Protein Needs: 75-85 grams protein/day (1.3 - 1.5 grams of pro/kg)  Justification: post-transplant   Estimated Fluid Needs: 1700 mL/day (1 mL/kcal)   Justification: Maintenance    PHYSICAL FINDINGS  See malnutrition section below.  No abnormal nutrition-related physical findings observed.     MALNUTRITION  % Intake: Unable to assess  % Weight Loss: None noted  Subcutaneous Fat Loss: None observed  Muscle Loss: Temporal:  Mild  Fluid Accumulation/Edema: None noted  Malnutrition Diagnosis: Patient does not meet two of the established criteria necessary for diagnosing malnutrition    NUTRITION DIAGNOSIS  Inadequate oral intake related to intubation as evidenced by NPO status and need for EN.       INTERVENTIONS  Implementation  Enteral Nutrition - Initiate     Goals  Total avg nutritional intake to meet a minimum of 25 kcal/kg and 1.3 g PRO/kg daily (per dosing wt 56 kg).     Monitoring/Evaluation  Progress toward goals will be monitored and evaluated per protocol.    Julia Alva, MS, RD, LD  4E (CVICU) RD pager: [phone]  Ascom: [phone]  Weekend/Holiday RD pager: [phone]

## 2022-06-29 NOTE — PROCEDURES
Small Bowel Feeding Tube Placement Assessment  Reason for Feeding Tube Placement: administration of nutrition and medication  Cortrak Start Time: 1100   Cortrak End Time: 1145  Medicine Delivered During Procedure: lubricating jelly  Placement Successful: presumed NGT    Procedure Complications: FT coiling in stomach, 2 RDs attempted to place FT  Final Placement Leonard at exit of nare: 96 cm  Face to Face time with patient: 45 minutes    Bridle Placement:   Reason for bridle placement: securement of FT   Medicine delivered during procedure: lubricating jelly   Procedure: Successful  Location of top of clip on FT: @ 98 cm marker   Condition of nose/skin at time of bridle placement: Unremarkable   Face to Face time with patient: <5 minutes.    Julia Alva, MS, RD, LD  4E (CVICU) RD pager: [phone]  Ascom: [phone]  Weekend/Holiday RD pager: [phone]

## 2022-06-29 NOTE — PLAN OF CARE
Goal Outcome Evaluation:    Plan of Care Reviewed With: patient, daughter     Overall Patient Progress: improving    Outcome Evaluation: Weaning support as tolerated.            Major Shift Events:    Arrived from OR just prior to 0300. 1L 5% albumin for hypotension and lactic 3/CVP2 and 500ml LR, lactic 3.3/CVP5, continues on EPI infusion MAP> 65. KRISTINE Downing, followed, fentanyl and propofol continued. CHRIS run x2, last CI 3.8, Ventilator weaned to 40%FiO2. 750ml sanguineous from chest tubes since OR. Electrolyte replacements ordered. OG verified. Urine output 35-50ccc/hr.     Plan:   Per  CVTS team plan to bronch, wean PEEP, place blocks, and extubate later today. Daughter Charity updated via telephone.     Patient will continue to be monitored and assessed. Please see MAR, flow sheets, and remainder of chart for further details. .           Admitted/transferred from: OR   Reason for admission/transfer: post-operative  2 RN skin assessment: completed by Tequila RN   Result of skin assessment and interventions/actions: Incision with would vac, chest tubes,  lines and drains as charted, pale and cool  skin.   Height, weight, drug calc weight: Not Done, weight done pre-op, bedscale malfunction, will need updated weight.   Patient belongings (see Flowsheet)- none   MDRO education added to care planN/A  ?

## 2022-06-29 NOTE — PLAN OF CARE
Goal Outcome Evaluation:    Plan of Care Reviewed With: patient     Overall Patient Progress: no change    Outcome Evaluation: FT placed    Problem: Oral Intake Inadequate  Goal: Improved Oral Intake  Outcome: Ongoing, Not Progressing

## 2022-06-29 NOTE — ANESTHESIA PROCEDURE NOTES
Airway         Procedure Start/Stop Times: 6/28/2022 7:51 PM  Staff -        Anesthesiologist:  Amalia Duckworth MD       Resident/Fellow: Leonora Dale MD       Performed By: resident  Consent for Airway        Urgency: emergent       Consent: The procedure was performed in an emergent situation.  Indications and Patient Condition       Indications for airway management: vinayak-procedural       Induction type:intravenous       Mask difficulty assessment: 1 - vent by mask    Final Airway Details       Final airway type: endotracheal airway       Successful airway: ETT - double lumen left  Endotracheal Airway Details        Cuffed: yes       Successful intubation technique: video laryngoscopy and direct laryngoscopy       VL Blade Size: MAC 3       Grade View of Cords: 1       Adjucts: stylet       Position: Center       Measured from: gums/teeth       Bite block used: None       ETT Double lumen (fr): 37    Post intubation assessment        Placement verified by: capnometry, equal breath sounds and chest rise        Number of attempts at approach: 1       Secured with: plastic tape       Ease of procedure: easy       Dentition: Intact and Unchanged    Medication(s) Administered   Medication Administration Time: 6/28/2022 7:51 PM

## 2022-06-29 NOTE — PLAN OF CARE
"Goal Outcome Evaluation:  bedside post tx bronchoscopy performed. Erector spinae catheters placed. NJ feeding tube inserted, dietician unable to place tube post pyloric-team aware and Reglan ordered and given. Pt given 1.5L LR and 2 units PRBC for elevated lactate, hypotension and hgb 6.8. Hgb recheck 9.8.  Pt required Phos replacement for Phos level 2.1.  UO 1.7 liters since [place], CT output total /hour.  Pt lightly sedated, follows commands, moves all extremities and nods \"yes/no\" to questions appropriately.  Plan to pressure support in am with extubation in am if patient's respiratory effort adequate.    Updated pt's daughter Charity by phone x2 this shift. Family plans to visit in person tomorrow.    Plan of Care: Patient improving                      "

## 2022-06-29 NOTE — PHARMACY-CONSULT NOTE
Pharmacy Tube Feeding Consult    Medication reviewed for administration by feeding tube and for potential food/drug interactions.    Recommendation: No changes are needed at this time.     Pharmacy will continue to follow as new medications are ordered.    Ravi HamiltonD  CVICU and Advanced Heart Failure Pharmacist  Pager [phone]

## 2022-06-29 NOTE — CONSULTS
Transplant Admission Psychosocial Assessment    Patient Name: Sofie Rodriguez  : 1960  Age: 62 year old  MRN: [id number]  Date of Initial Social Work Evaluation: 2021    Patient underwent lung transplant on 2022. Spoke to daughter, Charity,  to update psychosocial and care management assessment and provide education about SW role while inpatient, and to begin discussion of expectations/requirements, caregiver needs and follow up needs post-transplant.     Presenting Information   Living Situation: currently living with her son, Gera, in split level single family home in [place], [place]  If not local, plans for short term stay:  Wants [facility].  Placed on wait list today.   Functional Status: can do light housework.  Needs 5l of O2 with activity  Cultural/Language/Spiritual Considerations: n/a    Support System  Primary Support Person sonGera  Other support:  Daughters, Charity and Julia  Plan for support in immediate post-transplant period:  Per patient when I saw her in April, [place] Julia will be primary caregiver.  Staying with patient for 2 weeks at a time.  Gera and Charity will alternate giving her breaks for a week.    Health Care Directive  Decision Maker: patient's children  Alternate Decision Maker: n/a  Health Care Directive: Provided education. In previous visits, patient has stated she wants her children to make joint decisions.      Mental Health/Coping:   History of Mental Health: denies depression or anxiety.  No hx of mental health.    History of Chemical Health: Yes  Smoked marijuana and meth amphetamine regularly for 30 years.  Quit cold turkey in 2019.  CD treatment x3 in past without success.  Longest period of sobriety until now, was less than a year. Credits success this time by cutting off ties with using friends. Excellent support from family. Verbalizes commitment to sobriety.   1 drink/ 2 times per month until April.  I instructed her to quit on . Has not had  drink since that time. Confirmed continued abstinence  Current status: as above  Coping: distraction, support from family   Services Needed/Recommended: n/a    Financial   Income: SSD  Impact of transplant on income: none  Insurance and medication coverage: Medicare and Medical Assistance  Financial concerns: worried about cost of lodging.   Patient's medicaid coverage is only a QMB (supplement to medicaid.)  This will not cover lodging.  Patient states she has re-applied for medicaid.  Hoping to be eligible for full benefits.  I have asked accomodations dept. To check with county on coverage.   Resources needed: can use [facility] brenna to cover one month of lodging if needed..      Education provided by SW: Social Work role inpatient setting, availability of support groups, parking information, lodging options, caregiver requirement and need to remain in [place] area post discharge.       Assessment and recommendations and plan:  Provided daughter with contact info.  Asked her to share with siblings.  Encouraged contacts with any concerns.  Supportive counseling provided.  will continue to follow for support, counseling, education and resources.      Mana Valdivia [facility]  Lung Transplant   Phone: [phone] Pager: [phone]

## 2022-06-29 NOTE — CONSULTS
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 Sunshine.    At the request of CVICU bilateral ES catheters were placed for analgesia.  Please refer to Anesthesia Procedure Note by SANDY Thompson MD for full details    Plan   Initiate Programmed Intermittent Bolus (PIB) ropivacaine 0.2% at 7mL each catheter Q 60 min, total infusion 14 mL/hr    Plan to maintain catheters while chest tubes in place, max of 7 days      Please Page the Pain Team Via [employer]: \"Adult acute inpatient pain management/[facility]\"    RUFUS Harman CNP  06/29/2022   "

## 2022-06-29 NOTE — OP NOTE
[facility]   Division of Cardiothoracic Surgery     PREOPERATIVE DIAGNOSIS: End stage lung disease due to COPD  Patient Active Problem List   Diagnosis     COPD (chronic obstructive pulmonary disease) (H)     Encounter for pre-transplant evaluation for lung transplant     Abnormal findings on diagnostic imaging of cardiovascular system     Status post coronary angiogram     Hepatitis C, chronic (H)     Lung transplant candidate     S/P lung transplant (H)     Acute post-operative pain     Immunosuppressed status (H)       POSTOPERATIVE DIAGNOSIS:  End stage lung disease due to COPD  Patient Active Problem List   Diagnosis     COPD (chronic obstructive pulmonary disease) (H)     Encounter for pre-transplant evaluation for lung transplant     Abnormal findings on diagnostic imaging of cardiovascular system     Status post coronary angiogram     Hepatitis C, chronic (H)     Lung transplant candidate     S/P lung transplant (H)     Acute post-operative pain     Immunosuppressed status (H)       PROCEDURE:  1. Bilateral lung transplantation with cardiopulmonary bypass support.  2. Donor organ preparation  3. Flexible bronchoscopy  4. Lysis of severe bilateral adhesions    ATTENDING PHYSICIAN: Sue uSnshine MD    FELLOW:  Loida Mendenhall MD and Charles Chaidez MD    PUMP DATA: Bypass time 2 hours 38 min      Right lung Ischemic time 252 minutes     Left Lung Ischemic time 287 min    FINDINGS: Both native lungs were hyperinflated with significant chest wall adhesions and palpable scar. Normal hilar structure anatomy with appropriate size match with the donor. No aortic or cardiac disease evident.     INDICATIONS: Sofie Rodriguez is a 62 year old female with a history of COPD who presents for bilateral lung transplant. A suitable donor offer has become available. Risks and benefits of  lung transplantation were discussed. The patient expressed understanding and was willing to proceed.   DESCRIPTION OF  PROCEDURE Sofie Rodriguez was identified in the preoperative holding area. The patient was transported to the operating room, placed on the operating table in the supine position and a universal timeout was performed. Antibiotics and immunosuppression were administered preoperatively. The patient was intubated with a double lumen endotracheal tube by Anesthesia after general endotracheal anesthetic was administered. An arterial line was inserted. The patient was positioned for a clamshell incision and prepped and draped in the usual and sterile fashion including the neck, chest, abdomen and bilateral lower extremities.  A timeout was performed. Prior to incision, I verified the donor ABO and recipient ABO. After the donor organ arrived to the operating room and prior to anastomosis, I visually verified that the donor identification, blood type and other vital data were compatible with the recipient. We proceeded with clamshell incision and entered the chest. The retractor was placed. Once lysis of adhesions was complete, the hilar structures were dissected.  We dissected both lungs and freed them up from the chest wall. We encircled the hilar structures. When the donor organ arrived in the operating room, we prepared to go on cardiopulmonary bypass. We opened the pericardium and palpated the aorta. We administered full dose heparin. We placed pericardial stay sutures and cannulated the ascending aorta and the right atrium after the ACT was greater than 400 seconds. We initiated cardiopulmonary bypass and had excellent forward flow and drainage. We further dissected out the hilar structures.   Back table preparation: The lungs were split down the midline starting with the pericardium, the left atrium and then the pulmonary artery down through the pulmonary artery raphe. We obtained sufficient length of the main pulmonary artery branches bilaterally as well as the atrial cuffs. We took care not to denude the airways.  The mainstem bronchi were divided two cartilaginous rings proximal to the secondary nabil.    We proceeded with the recipient pneumonectomy and used vascular staple loads to staple off the inferior and superior pulmonary veins. We placed a clamp on the pulmonary artery proximally and divided distally. We ligated and cliped any visible bronchial arteries. We divided the right main stem bronchus. We studied and palpated the specimen for abnormalities, obtained hemostasis with the aquamantis and began the implantation. Cultures were taken of the donor and recipient lungs.   We used a continuous 4-0 Prolene RB1 suture for the bronchus taking care to line up the membranous and cartilaginous portions. We obtained sufficient proximal length on the atrial cuff after opening the pericardium, clamped it and sewed the donor to recipient atrium with a continuous 4-O RB1 prolene suture. We imbricated the atrial tissue circumferentially. We flushed the atrium and deaired.  We then used a continuous 5-0 Prolene for the pulmonary artery. The clamp was taken off the pulmonary artery. The volume was allowed to flush through the lung and the de-airing of the left atrial suture line was done before the suture line was secured. The atrial clamp was then removed. We inspected all areas for hemostasis, which was obtained.   We then turned our attention to the left lung. The recipient lung was dissected and sewn in a similar manner. We checked our anastomoses and confirmed hemostasis. We began to gently ventilate. Saturations and compliance were excellent. We were able to gradually wean from cardiopulmonary bypass. We administered the protamine and removed the aortic and right atrial cannulas. All cannulation sites were hemostatic. We completed the protamine. We confirmed hemostasis. We placed bilateral pleural and a single mediastinal chest tubes. Once we felt the patient was hemodynamically stable and hemostatic, we proceeded to close  the chest with sternal wires for the sternum, and figure of 8 #5 Ethibond sutures to reapproximate the ribs.  We closed the muscle and subcutaneous tissue in multiple layers of absorbable running suture and the skin with 3-0 Vicryl suture. A wound vac dressing was placed. Sponge, needle and instrument counts were correct.  The final bronchoscopy showed a patent bilateral anastomoses and some secretions in the distal airways.   Sofie Rodriguez tolerated the procedure well. She was transported to the Intensive Care Unit in stable condition. I was present for the entire operation. There were no intraoperative complications.  Sue Sunshine MD

## 2022-06-29 NOTE — ANESTHESIA PROCEDURE NOTES
Airway         Procedure Start/Stop Times: 6/28/2022 7:51 PM  Staff -        Anesthesiologist:  Amalia Duckworth MD       Resident/Fellow: Leonora Dale MD       Performed By: resident  Consent for Airway        Urgency: emergent       Consent: The procedure was performed in an emergent situation.  Indications and Patient Condition       Indications for airway management: vinayak-procedural       Induction type:other (comments)       Mask difficulty assessment: 0 - not attempted    Final Airway Details       Final airway type: endotracheal airway       Successful airway: ETT - single  Endotracheal Airway Details        ETT size (mm): 8.0       Cuffed: yes       Successful intubation technique: video laryngoscopy       VL Blade Size: MAC 3       Grade View of Cords: 1       Adjucts: exchange catheter       Position: Center       Measured from: gums/teeth       Secured at (cm): 22       Bite block used: None    Post intubation assessment        Placement verified by: capnometry, equal breath sounds and chest rise        Number of attempts at approach: 1       Secured with: silk tape       Ease of procedure: easy       Dentition: Intact and Unchanged    Medication(s) Administered   Medication Administration Time: 6/28/2022 7:51 PM    Additional Comments       Following completion of lung transplant, 37 fr JERRY was exchanged using a exchange catheter, under visualization with video laryngoscopy

## 2022-06-29 NOTE — CONSULTS
Pulmonary Medicine  Cystic Fibrosis - Lung Transplant Team  Initial Consultation  2022      Patient: Sofie Rodriguez  MRN: [id number]  : 1960 (age 62 year old)  Transplant: 2022 (Lung), POD#1  Admission date: 2022  Primary Care Provider: Vinny Berrios    Assessment & Plan:     Sofie Rodriguez is a 62 year old female with a PMH significant for end stage COPD, HTN, Hep C, and osteopenia as well as former methamphetamine use.  Pt. is now s/p BSLT on 22.  Surgery on CPB, uncomplicated, lungs slightly undersized for chest.     S/p bilateral sequential lung transplant (BSLT) for end stage COPD:  Acute on chronic hypoxic respiratory failure: Patient is currently on pressors but with excellent oxygenation.   - Nebs: levalbuterol and Mucomyst QID  - Aggressive pulmonary toilet with chest physiotherapy QID (addition of Aerobika and incentive spirometry once extubated)  - DSA on  (ordered) then one month post-transplant  - Ammonia monitoring every 48 hours x 3 weeks (screening for hyperammonemia post-lung transplant)  - Ventilator management per ICU team  - Bronch  with patent airways, no significant ischemic reperfusion injury, no significant edema, occasional mucous plugs in lower lobes bilaterally but removed upon therapeutic suctioning.   - PVTS catheters placed   - Chest tubes managed by surgical team  - Daily CXR: reviewed, very clear lungs bilaterally, no significant infiltrates.   - Post-pyloric feeding tube placement as soon able for enteral nutrition, trickle feed rate only (max 20 ml/hr) with gastric access  - SLP consult as pt. nears extubation for swallowing evaluation  - Await explant pathology    Immunosuppression:  Induction therapy with basiliximab (and high dose IV steroid) given intraoperatively, repeating basiliximab dose on POD#4 (ordered for ).  - Tacrolimus 1 mg SL BID.  Goal tacrolimus level 8-12. Trending daily levels  Date Tacro Level Intervention    6/29 <1.0 Continue current dose, 1 mg bid             - MMF 1500 mg BID  - Methylprednisolone 125 mg x 3 doses, then prednisolone BID with taper per lung transplant protocol:  Date AM dose (mg) PM dose (mg)   6/30 17.5 17.5   7/7 15 15   7/14 15 12.5   7/21 12.5 12.5   8/4 12.5 10   8/18 10 10   9/15 10 7.5   10/13 7.5 7.5   11/10 7.5 5   12/8 5 5   1/5/23 5 2.5     Prophylaxis:   - Bactrim for PJP ppx deferred initially post-op   - VGCV for CMV ppx (as below to start on 7/6)  - Nystatin for oral candidiasis ppx, 6 month course  - See below for serologies and viral ppx:   Donor Recipient Intervention   CMV status + + Valganciclovir POD #8-90   EBV status + + EBV check every 3 months   HSV status N/A + No Acyclovir prophylaxis     Primary graft dysfunction (per ISHLT guidelines):  See chart below:   POD #0  (~0 hours) POD #1  (~24 hours) POD #2   (~48 hours) POD#3   (~72 hours)   Date 6/29/22      Time 0535      Intubated Y Y/N Y/N Y/N   PaO2 163      FiO2 40%      P/F Ratio 408      PGD Grade   (0=mild, 3=severe) 0      ECMO N Y/N Y/N Y/N   Inhaled NO/Flolan N Y/N Y/N Y/N   ISHLT PGD Scoring  Grade 0 - PaO2/FiO2 >300 and normal chest radiograph (absence of diffuse allograft infiltrates)  Grade 1 - PaO2/FiO2 >300 and diffuse allograft infiltrates on chest radiograph  Grade 2 - PaO2/FiO2 between 200 and 300  Grade 3 - PaO2/FiO2 <200 and/or on ECMO    ID: Prior history of colonization with Mycobacterium peregrinum  - AFB to be sent on all future bronchs  - IgG at one month  - Donor cultures: No growth to date  - Recipient cultures: Pending  - Continue IV ceftaz/vancomycin for 48h per protocol, will adjust antibiotic plan based on results of the above cultures    H/O Hep C: C: Diagnosed in 1980s, 2 mos of treatment, quant negative since 10/2017, last positive 2/20/17 ([phone]).Glenis positive on 6/2021 with negative HCV PCR. Hx of remote ETOH abuse. MR Elastography 4/27/21 with hepatology review and consult without any  concerns post transplant.     History of steroid induced hyperglycemia: Well controlled in outpatient. Management by primary team, may need endo consult prior to discharge.     We appreciate the excellent care provided by the CVTS and CVICU teams.  Recommendations communicated via in person rounding and this note.  Will continue to follow along closely, please do not hesitate to call with any questions or concerns.    Claribel Franks MD on 6/29/2022 at 7:32 AM     Chief Complaint:   S/p double lung transplant    History of Present Illness:     History obtained from chart review and outpatient documentation    63 yo woman with end stage COPD, remote methamphetamine use (sober since 2019), previous Hep C infection, who presented from the outpatient to undergo bilateral sequential lung transplant which occurred on 6/28. She required minimal pressors directly post op, lungs were slightly undersized, but tolerated procedure well on cardiopulmonary bypass per discussion with Dr. Sunshine. She has been hypotensive and volume responsive since coming out of OR with variable pressor needs but PF ratio remains quite high with a clear CXR.       Review of Systems:     Complete ROS as above, otherwise severely limited by sedation and intubation.    Medical and Surgical History:     Past Medical History:   Diagnosis Date     CHF (congestive heart failure) (H)      COPD (chronic obstructive pulmonary disease) (H)      Hepatitis 2017    Hep C, [facility]     HTN (hypertension)      Osteopenia      Past Surgical History:   Procedure Laterality Date     COLONOSCOPY  2015     CV CORONARY ANGIOGRAM N/A 6/30/2021    Procedure: CV CORONARY ANGIOGRAM;  Surgeon: Alexander Cuellar MD;  [facility]     CV RIGHT HEART CATH MEASUREMENTS RECORDED N/A 6/30/2021    Procedure: CV RIGHT HEART CATH;  Surgeon: Alexander Cuellar MD;  Location: [facility]     ENT SURGERY  1974    tonsillectomy     HAND SURGERY        LEEP TX, CERVICAL  2017    HECTOR III     LYMPH NODE BIOPSY Left     Left axilla, benign- [place]     Social and Family History:     Social History     Socioeconomic History     Marital status: Single     Spouse name: Not on file     Number of children: Not on file     Years of education: Not on file     Highest education level: Not on file   Occupational History     Not on file   Tobacco Use     Smoking status: Former Smoker     Years: 30.00     Types: Cigarettes     Quit date: 2020     Years since quittin.6     Smokeless tobacco: Never Used   Substance and Sexual Activity     Alcohol use: Not Currently     Drug use: Not Currently     Types: Marijuana, Methamphetamines     Comment: hx:marijuana and methamphetamine-quit both unsure ?  2-3 yrs ago     Sexual activity: Not on file   Other Topics Concern     Parent/sibling w/ CABG, MI or angioplasty before 65F 55M? Not Asked   Social History Narrative     Not on file     Social Determinants of Health     Financial Resource Strain: Not on file   Food Insecurity: Not on file   Transportation Needs: Not on file   Physical Activity: Not on file   Stress: Not on file   Social Connections: Not on file   Intimate Partner Violence: Not on file   Housing Stability: Not on file     History reviewed. No pertinent family history.  Allergies and Home Medications:   No Known Allergies  Medications Prior to Admission   Medication Sig Dispense Refill Last Dose     albuterol (PROAIR HFA/PROVENTIL HFA/VENTOLIN HFA) 108 (90 BASE) MCG/ACT Inhaler Inhale 2 puffs into the lungs every 6 hours as needed for shortness of breath / dyspnea or wheezing   2022     aspirin (ASA) 81 MG chewable tablet Take 81 mg by mouth daily   2022     CALCIUM-VITAMIN D PO Take 1 tablet by mouth daily   2022 at 0930     fluticasone-vilanterol (BREO ELLIPTA) 100-25 MCG/INH inhaler Inhale 1 puff into the lungs daily 25 mcg   2022 at 0930     furosemide (LASIX) 20 MG tablet Take  1 tablet (20 mg) by mouth daily 60 tablet 3 6/28/2022 at 0930     ipratropium - albuterol 0.5 mg/2.5 mg/3 mL (DUONEB) 0.5-2.5 (3) MG/3ML neb solution Inhale 1 vial into the lungs every 4 hours as needed for shortness of breath / dyspnea   6/27/2022     Multiple Vitamin (QUINTABS) TABS Take 1 tablet by mouth daily   6/28/2022 at 0930     umeclidinium (INCRUSE ELLIPTA) 62.5 MCG/INH inhaler Inhale 1 puff into the lungs daily 62.5mcg   6/28/2022 at 0930     Current Scheduled Meds    acetaminophen  975 mg Oral Q8H     acetylcysteine  2 mL Nebulization 4x Daily     [START ON 7/2/2022] basiliximab (SIMULECT) infusion  20 mg Intravenous Once     [START ON 7/6/2022] calcium carbonate 600 mg-vitamin D 400 units  1 tablet Oral BID w/meals     calcium gluconate  2 g Intravenous Once     cefTAZidime  2 g Intravenous Q8H     gabapentin  100 mg Oral TID     levalbuterol  1.25 mg Nebulization 4x Daily     methylPREDNISolone  125 mg Intravenous Q8H    Followed by     [START ON 6/30/2022] prednisoLONE  0.25 mg/kg Oral or NG Tube BID     mycophenolate  1,500 mg Oral BID    Or     mycophenolate  1,500 mg Oral or NG Tube BID     nystatin  1,000,000 Units Swish & Swallow 4x Daily     pantoprazole (PROTONIX) IV  40 mg Intravenous Daily     polyethylene glycol  17 g Oral Daily     sodium phosphate  15 mmol Intravenous Once     senna-docusate  1 tablet Oral BID     sodium chloride (PF)  3 mL Intracatheter Q8H     [START ON 7/6/2022] sulfamethoxazole-trimethoprim  10 mL Oral or NG Tube Daily    Or     [START ON 7/6/2022] sulfamethoxazole-trimethoprim  1 tablet Oral or NG Tube Daily     tacrolimus  1 mg Sublingual BID IS     [START ON 7/6/2022] valGANciclovir  900 mg Oral or NG Tube Daily     vancomycin  1,000 mg Intravenous Q12H      Current PRN Meds  [START ON 7/1/2022] acetaminophen, bisacodyl, dextrose, glucose **OR** dextrose **OR** glucagon, HYDROmorphone **OR** HYDROmorphone, lactated ringers, lidocaine 4%, lidocaine (buffered or not  "buffered), magnesium hydroxide, naloxone **OR** naloxone **OR** naloxone **OR** naloxone, ondansetron **OR** ondansetron, oxyCODONE **OR** oxyCODONE, prochlorperazine **OR** prochlorperazine, sodium chloride (PF)     Physical Exam:     All notes, images, and labs from past 24 hours (at minimum) were reviewed.    Vital signs:  Temp: (!) 96.4  F (35.8  C) Temp src: Bladder BP: (!) 141/94 Pulse: 104   Resp: 18 SpO2: 100 % O2 Device: Nasal cannula Oxygen Delivery: 2 LPM Height: 157.5 cm (5' 2\") Weight:  (bedscale reading 82kg, ? and power board not connected)  I/O:     Intake/Output Summary (Last 24 hours) at 6/29/2022 0732  Last data filed at 6/29/2022 0700  Gross per 24 hour   Intake 6686.08 ml   Output 2420 ml   Net 4266.08 ml     Constitutional: sedated, in no apparent distress.   HEENT: Eyes with pink conjunctivae, anicteric.  Orally intubated.   PULM: Good air flow bilaterally.  No crackles, no rhonchi, no wheezes.  Non-labored breathing on full vent support  CV: Normal S1 and S2.  RRR.  No murmur, gallop, or rub.  No peripheral edema.   ABD: BS hypoactive but present, soft, nontender, nondistended.    MSK: Moves all extremities.  No apparent muscle wasting.   NEURO: Sedated, not conversant.   SKIN: Warm, dry. No rash on limited exam.   PSYCH: Mood unable to be assessed. ?     Lines, Drains, and Devices:  Peripheral IV 06/28/22 Anterior;Distal;Left Lower forearm (Active)   Site Assessment [employer] 06/29/22 0400   Line Status Saline locked 06/29/22 0400   Phlebitis Scale 0-->no symptoms 06/29/22 0400   Infiltration Scale 0 06/29/22 0400   Number of days: 1       Peripheral IV 06/28/22 Right Upper forearm (Active)   Site Assessment [employer] 06/29/22 0400   Line Status Infusing;Checked every 1-2 hour 06/29/22 0400   Phlebitis Scale 0-->no symptoms 06/29/22 0400   Infiltration Scale 0 06/29/22 0400   Number of days: 1       Introducer 06/28/22 Internal jugular Right (Active)   Number of days: 1       Arterial Line 06/28/22 " Radial (Active)   Number of days: 1       CVC Double Lumen Right Internal jugular (Active)   Site Assessment WDL except 06/29/22 0400   Dressing Type Transparent 06/29/22 0400   Dressing Status intact 06/29/22 0400   Dressing Change Due 07/03/22 06/29/22 0400   Tubing Change secondary tubing;primary tubing 06/29/22 0400   Line Necessity yes, meets criteria 06/29/22 0400   Brown - Status infusing 06/29/22 0400   Brown - Cap Change Due 07/01/22 06/29/22 0400   White - Status infusing 06/29/22 0400   White - Cap Change Due 07/01/22 06/29/22 0400   Number of days: 1       Pulmonary Artery Catheter - Triple Lumen 06/28/22  Right internal jugular vein (Active)   Dressing dressing dry and intact 06/29/22 0400   Securement secured with sutures 06/29/22 0400   PA Catheter Markings (cm) 53 06/29/22 0400   Phlebitis 0-->no symptoms 06/29/22 0400   Infiltration 0-->no symptoms 06/29/22 0400   Waveform normal 06/29/22 0400   Lumen A - Color YELLOW 06/29/22 0400   Lumen A - Status transduced 06/29/22 0400   Lumen A - Cap Change Due 07/01/22 06/29/22 0400   Lumen B - Color BLUE 06/29/22 0400   Lumen B - Status transduced 06/29/22 0400   Lumen B - Cap Change Due 07/01/22 06/29/22 0400   Lumen C - Color WHITE 06/29/22 0400   Lumen C - Status saline locked 06/29/22 0400   Lumen C - Cap Change Due 07/10/22 06/29/22 0400   Number of days: 1     Results:     LABS    CMP:   Recent Labs   Lab 06/29/22  0548 06/29/22  0542 06/29/22  0411 06/29/22  0250 06/29/22  0248 06/29/22  0141 06/29/22  0106 06/29/22  0032 06/28/22  1745 06/28/22  1258   NA  --   --   --   --  139 140 139 140   < > 139   POTASSIUM  --   --   --   --  4.0 3.6 4.1 3.2*   < > 4.2   CHLORIDE  --   --   --   --  100  --   --   --   --  93*   CO2  --   --   --   --  24  --   --   --   --  35*   ANIONGAP  --   --   --   --  15  --   --   --   --  11   * 254* 157* 126* 159* 115* 123* 139*   < > 96   BUN  --   --   --   --  16.5  --   --   --   --  14.2   CR  --   --    --   --  0.94  --   --   --   --  0.85   GFRESTIMATED  --   --   --   --  69  --   --   --   --  78   ESTUARDO  --   --   --   --  8.1*  --   --   --   --  11.1*   MAG  --   --   --   --  2.3  --   --   --   --  2.2   PHOS  --   --   --   --  1.6*  --   --   --   --  3.3   PROTTOTAL  --   --   --   --  3.7*  --   --   --   --  7.9   ALBUMIN  --   --   --   --  2.3*  --   --   --   --  5.3*   BILITOTAL  --   --   --   --  0.9  --   --   --   --  0.4   ALKPHOS  --   --   --   --  36  --   --   --   --  86   AST  --   --   --   --  143*  --   --   --   --  30   ALT  --   --   --   --  33  --   --   --   --  16    < > = values in this interval not displayed.     CBC:   Recent Labs   Lab 06/29/22  0536 06/29/22  0248 06/29/22  0141 06/29/22  0106 06/29/22  0011 06/29/22  0007 06/28/22 2016 06/28/22  1258   WBC 16.2* 12.6*  --   --   --  9.6  --  6.8   RBC 2.48* 2.95*  --   --   --  2.16*  --  4.35   HGB 7.6* 9.0* 9.1* 8.5*   < > 6.8*   < > 13.4   HCT 23.1* 27.5*  --   --   --  21.4*  --  43.1   MCV 93 93  --   --   --  99  --  99   MCH 30.6 30.5  --   --   --  31.5  --  30.8   MCHC 32.9 32.7  --   --   --  31.8  --  31.1*   RDW 15.1* 14.8  --   --   --  12.7  --  12.5   * 124*  --   --   --  133*  --  255    < > = values in this interval not displayed.       INR:   Recent Labs   Lab 06/29/22  0248 06/29/22  0007 06/28/22  1258   INR 1.33* 1.60* 0.91       Glucose:   Recent Labs   Lab 06/29/22  0548 06/29/22  0542 06/29/22  0411 06/29/22  0250 06/29/22  0248 06/29/22  0141   * 254* 157* 126* 159* 115*       Blood Gas:   Recent Labs   Lab 06/29/22  0536 06/29/22  0535 06/29/22  0248   PHV 7.37  --  7.34   PCO2V 40  --  52*   PO2V 37  --  33   HCO3V 23  --  28   LINDY -2.3  --  2.0*   O2PER 40 40 100  100       Culture Data No results for input(s): CULT in the last 168 hours.    Virology Data:   Lab Results   Component Value Date    FLUAH1 Not Detected 06/28/2022    FLUAH3 Not Detected 06/28/2022    RL8647 Not  Detected 06/28/2022    IFLUB Not Detected 06/28/2022    RSVA Not Detected 06/28/2022    RSVB Not Detected 06/28/2022    PIV1 Not Detected 06/28/2022    PIV2 Not Detected 06/28/2022    PIV3 Not Detected 06/28/2022    HMPV Not Detected 06/28/2022       Historical CMV results (last 3 of prior testing):  No results found for: CMVQNT  No results found for: CMVLOG    Urine Studies    Recent Labs   Lab Test 06/28/22  1309 06/14/21  0939   URINEPH 5.0 5.0   NITRITE Negative Negative   LEUKEST Negative Trace*   WBCU 1 8*       Most Recent [facility] Pulmonary Function Testing (FVC/FEV1 only)  FVC-Pre   Date Value Ref Range Status   04/29/2022 1.82 L    11/11/2021 2.17 L    06/14/2021 2.00 L      FVC-%Pred-Pre   Date Value Ref Range Status   04/29/2022 58 %    11/11/2021 70 %    06/14/2021 64 %      FEV1-Pre   Date Value Ref Range Status   04/29/2022 0.51 L    11/11/2021 0.53 L    06/14/2021 0.54 L      FEV1-%Pred-Pre   Date Value Ref Range Status   04/29/2022 20 %    11/11/2021 21 %    06/14/2021 21 %        IMAGING    Recent Results (from the past 48 hour(s))   XR Chest 2 Views    Narrative    Exam: XR CHEST 2 VW, 6/28/2022 2:09 PM    Comparison: CT chest 4/4/2022, chest x-ray 6/14/2021    History: pre-op lung transplant    Findings:  PA and lateral views of the chest.  Trachea is midline. The heart size  is within normal limits. Atherosclerotic ectasia of the aortic arch.  Hyperinflated lungs with emphysematous changes. No acute airspace  disease. There is no pneumothorax or pleural effusion. The upper  abdomen is unremarkable. No acute osseous abnormality. Flattened  hemidiaphragms on the lateral view.      Impression    Impression:   Hyperinflated lungs with emphysematous changes. No acute airspace  disease.     I have personally reviewed the examination and initial interpretation  and I agree with the findings.    ALVINA HARRY MD         SYSTEM ID:  [id number]   XR Chest Port 1 View    Impression    RESIDENT  PRELIMINARY INTERPRETATION  IMPRESSION:   1. Postoperative chest with mild perihilar and bibasilar atelectasis.  2. Endotracheal tube tip approximately 6 cm above the nabil.  3. Right IJ Springhill-Dexter catheter tip in the right pulmonary artery.

## 2022-06-29 NOTE — ADDENDUM NOTE
Addendum  created 06/29/22 1038 by Kd Tapia MD    Attestation recorded in Intraprocedure, Flowsheet accepted, Intraprocedure Attestations filed

## 2022-06-29 NOTE — ADDENDUM NOTE
Addendum  created 06/29/22 0301 by Leonora Dale MD    Child order released for a procedure order, Clinical Note Signed, Flowsheet accepted, Flowsheet data copied forward, Intraprocedure Blocks edited, Intraprocedure Flowsheets edited, Intraprocedure Meds edited, LDA created via procedure documentation, LDA removed via procedure documentation, SmartForm saved

## 2022-06-29 NOTE — ANESTHESIA POSTPROCEDURE EVALUATION
Patient: Sofie Rodriguez    Procedure: Procedure(s):  TRANSPLANT, LUNG, RECIPIENT, BILATERAL, Clamshell Incision, Cardiopulmonary Bypass, Bronchoscopy       Anesthesia Type:  General    Note:  Disposition: ICU            ICU Sign Out: Anesthesiologist/ICU physician sign out WAS performed   Postop Pain Control: Uneventful            Sign Out: Well controlled pain   PONV: No   Neuro/Psych: Uneventful            Sign Out: PLANNED postop sedation   Airway/Respiratory: Uneventful            Sign Out: AIRWAY IN SITU/Resp. Support               Airway in situ/Resp. Support: ETT                 Reason: Planned Pre-op   CV/Hemodynamics: Uneventful            Sign Out: Acceptable CV status; No obvious hypovolemia; No obvious fluid overload   Other NRE: NONE   DID A NON-ROUTINE EVENT OCCUR? No    Event details/Postop Comments:  Patient transported directly from the OR to the ICU sedated and intubated.  JERRY changed out to 8.0 SLT prior to transport.  Vitals stable on epi 0.07.  Report given to ICU.           Last vitals:  Vitals:    06/28/22 1106 06/28/22 1500 06/28/22 1743   BP: (!) 147/98 (!) 143/72 (!) 141/94   Pulse: 120 101 115   Resp: 22 20 20   Temp: 36.7  C (98  F) 36.9  C (98.4  F) 37.7  C (99.8  F)   SpO2: 95% 94% 94%       Electronically Signed By: Amalia Duckworth MD  June 29, 2022  2:49 AM

## 2022-06-29 NOTE — ANESTHESIA PROCEDURE NOTES
Erector spinae Procedure Note    Pre-Procedure   Staff -        Anesthesiologist:  Kd Tapia MD       Resident/Fellow: Rosa Thompson MD       Performed By: resident       Location: ICU       Procedure Start/Stop Times: 6/29/2022 10:10 AM and 6/29/2022 10:30 AM       Pre-Anesthestic Checklist: patient identified, IV checked, site marked, risks and benefits discussed, informed consent, monitors and equipment checked, pre-op evaluation, at physician/surgeon's request and post-op pain management  Timeout:       Correct Patient: Yes        Correct Procedure: Yes        Correct Site: Yes        Correct Position: Yes        Correct Laterality: Yes        Site Marked: Yes  Procedure Documentation  Procedure: Erector spinae       Diagnosis: POSTOPERATIVE PAIN       Laterality: bilateral       Patient Position: lateral       Patient Prep/Sterile Barriers: sterile gloves, mask, patient draped       Skin prep: Chloraprep       Local skin infiltrated with 5 mL of 1% lidocaine.        Insertion Site: T5-6.       Needle Type: ToZoonay needle       Needle Gauge: 17.        Needle Length (Inches): 3.13        Catheter: 19 G.          Catheter threaded easily.             Ultrasound guided       1. Ultrasound was used to identify targeted nerve, plexus, vascular marker, or fascial plane and place a needle adjacent to it in real-time.       2. Ultrasound was used to visualize the spread of anesthetic in close proximity to the above referenced structure.    Assessment/Narrative         The placement was negative for: blood aspirated, painful injection and site bleeding       Paresthesias: No.       Bolus given via catheter. no blood aspirated via catheter.        Secured via Dermabond and Tegaderm.        Complications: none    Medication(s) Administered   Bupivacaine 0.25% PF (Infiltration) - Infiltration   20 mL - 6/29/2022 10:25:00 AM  Medication Administration Time: 6/29/2022 10:10 AM     Comments:  0.25% Bupivacaine bolused - 10  ml each

## 2022-06-29 NOTE — PHARMACY-TRANSPLANT NOTE
Adult Lung Transplant Post Operative Note    62 year old female s/p lung transplant on 06/28/22 for COPD.      Planned immunosuppression regimen to include basiliximab POD#0 & #4, tacrolimus (goal 8-12 mcg/L), mycophenolate and prednisone.      Opportunistic pathogen prophylaxis includes: nystatin, trimethoprim/sulfamethoxazole and valganciclovir POD#8-90.    Patient is not enrolled in medication study.    Patient with planned immunosuppression and prophylaxis as above.  Pharmacy will monitor for medication interactions and immunosuppression levels in conjunction with the team. Medication therapy needs for discharge planning will continue to be addressed throughout the current admission via multidisciplinary rounds and order review. Pharmacy will make recommendations as appropriate.    Valeria Alva, Michael  CVICU and Advanced Heart Failure Pharmacist  Pager [phone]

## 2022-06-29 NOTE — ANESTHESIA PROCEDURE NOTES
Central Line/PA Catheter Placement    Pre-Procedure   Staff -        Anesthesiologist:  Amalia Duckworth MD       Resident/Fellow: Leonora Dale MD       Other Anesthesia Staff: Rufina Ramirez MD       Performed By: resident       Location: OR       Pre-Anesthestic Checklist: patient identified, IV checked, site marked, risks and benefits discussed, informed consent, monitors and equipment checked, pre-op evaluation and at physician/surgeon's request  Timeout:       Correct Patient: Yes        Correct Procedure: Yes        Correct Site: Yes        Correct Position: Yes        Correct Laterality: Yes   Line Placement:   This line was placed Post Induction    Procedure   Procedure: central line       Laterality: right       Insertion Site: internal jugular.       Patient Position: Trendelenburg  Sterile Prep        All elements of maximal sterile barrier technique followed       Patient Prep/Sterile Barriers: draped, hand hygiene, gloves , hat , mask , draped, gown, sterile gel and probe cover       Skin prep: Chloraprep  Insertion/Injection        Technique: ultrasound guided        1. Ultrasound was used to evaluate the access site.       2. Vein evaluated via ultrasound for patency/adequacy.       3. Using real-time ultrasound the needle/catheter was observed entering the artery/vein.       Introducer Type: 9 Fr, 2-lumen MAC        Type: PA/CVC with Introducer       Catheter Size: 9 Fr       Catheter Length: 11.5       Number of Lumens: double lumen       PA Catheter Type: CCO         Appropriate RV, RA and PA waveforms noted:  Yes            Withdrawn and Locked at cm: 48            Balloon down at end of the procedure:   Narrative         Secured by: suture       Tegaderm dressing used.       Complications: None apparent,        blood aspirated from all lumens,        All lumens flushed: Yes       Verification method: Placement to be verified post-op and Ultrasound

## 2022-06-29 NOTE — ANESTHESIA PROCEDURE NOTES
Arterial Line Procedure Note    Pre-Procedure   Staff -        Anesthesiologist:  Rufina Ramirez MD       Resident/Fellow: Leonora Dale MD       Performed By: anesthesiologist       Pre-Anesthestic Checklist: patient identified, IV checked, risks and benefits discussed, informed consent, monitors and equipment checked, pre-op evaluation and at physician/surgeon's request  Timeout:       Correct Patient: Yes        Correct Procedure: Yes        Correct Site: Yes        Correct Position: Yes   Line Placement:   This line was placed Pre Induction starting at 6/28/2022 7:39 AM and ending at 6/28/2022 7:46 AM  Procedure   Procedure: arterial line       Diagnosis: hemodynamic monitoring       Laterality: left       Insertion Site: radial.  Sterile Prep        Standard elements of sterile barrier followed       Skin prep: Chloraprep  Insertion/Injection        Technique: ultrasound guided        1. Ultrasound was used to evaluate the access site.       2. Artery evaluated via ultrasound for patency/adequacy.       3. Using real-time ultrasound the needle/catheter was observed entering the artery/vein.       Catheter Type/Size: 20 G, 12 cm  Narrative         Secured by: suture       Tegaderm dressing used.       Complications: None apparent,        Arterial waveform: Yes        IBP within 10% of NIBP: Yes

## 2022-06-29 NOTE — PLAN OF CARE
SLP: Orders received for swallow evaluation s/p lung transplant, post-operative day 2-3. Pt is currently intubated (POD1) and is not yet appropriate for participation. Will hold and complete swallow eval as appropriate.

## 2022-06-30 ENCOUNTER — APPOINTMENT (OUTPATIENT)
Dept: GENERAL RADIOLOGY | Facility: CLINIC | Age: 62
DRG: 007 | End: 2022-06-30
Attending: PHYSICIAN ASSISTANT
Payer: MEDICARE

## 2022-06-30 ENCOUNTER — APPOINTMENT (OUTPATIENT)
Dept: GENERAL RADIOLOGY | Facility: CLINIC | Age: 62
DRG: 007 | End: 2022-06-30
Attending: STUDENT IN AN ORGANIZED HEALTH CARE EDUCATION/TRAINING PROGRAM
Payer: MEDICARE

## 2022-06-30 PROBLEM — Z76.82 LUNG TRANSPLANT CANDIDATE: Status: RESOLVED | Noted: 2022-06-28 | Resolved: 2022-06-30

## 2022-06-30 PROBLEM — Z01.818 ENCOUNTER FOR PRE-TRANSPLANT EVALUATION FOR LUNG TRANSPLANT: Status: RESOLVED | Noted: 2021-04-14 | Resolved: 2022-06-30

## 2022-06-30 LAB
ALBUMIN SERPL BCG-MCNC: 2.6 G/DL (ref 3.5–5.2)
ALBUMIN SERPL BCG-MCNC: 2.8 G/DL (ref 3.5–5.2)
ALP SERPL-CCNC: 35 U/L (ref 35–104)
ALP SERPL-CCNC: 40 U/L (ref 35–104)
ALT SERPL W P-5'-P-CCNC: 23 U/L (ref 10–35)
ALT SERPL W P-5'-P-CCNC: 27 U/L (ref 10–35)
AMMONIA PLAS-SCNC: 25 UMOL/L (ref 11–51)
ANION GAP SERPL CALCULATED.3IONS-SCNC: 8 MMOL/L (ref 7–15)
ANION GAP SERPL CALCULATED.3IONS-SCNC: 9 MMOL/L (ref 7–15)
AST SERPL W P-5'-P-CCNC: 65 U/L (ref 10–35)
AST SERPL W P-5'-P-CCNC: 78 U/L (ref 10–35)
BASE EXCESS BLDA CALC-SCNC: 3.4 MMOL/L (ref -9–1.8)
BASE EXCESS BLDA CALC-SCNC: 5.5 MMOL/L (ref -9–1.8)
BASE EXCESS BLDA CALC-SCNC: 5.9 MMOL/L (ref -9–1.8)
BASE EXCESS BLDV CALC-SCNC: 0.5 MMOL/L (ref -7.7–1.9)
BASE EXCESS BLDV CALC-SCNC: 2.7 MMOL/L (ref -7.7–1.9)
BASE EXCESS BLDV CALC-SCNC: 3.6 MMOL/L (ref -7.7–1.9)
BASE EXCESS BLDV CALC-SCNC: 4 MMOL/L (ref -7.7–1.9)
BASE EXCESS BLDV CALC-SCNC: 4.8 MMOL/L (ref -7.7–1.9)
BASE EXCESS BLDV CALC-SCNC: 4.8 MMOL/L (ref -7.7–1.9)
BASOPHILS # BLD AUTO: 0 10E3/UL (ref 0–0.2)
BASOPHILS NFR BLD AUTO: 0 %
BILIRUB DIRECT SERPL-MCNC: <0.2 MG/DL (ref 0–0.3)
BILIRUB SERPL-MCNC: 0.3 MG/DL
BILIRUB SERPL-MCNC: 0.3 MG/DL
BUN SERPL-MCNC: 19.6 MG/DL (ref 8–23)
BUN SERPL-MCNC: 20.6 MG/DL (ref 8–23)
CA-I BLD-MCNC: 4.4 MG/DL (ref 4.4–5.2)
CA-I BLD-MCNC: 4.4 MG/DL (ref 4.4–5.2)
CALCIUM SERPL-MCNC: 7.8 MG/DL (ref 8.8–10.2)
CALCIUM SERPL-MCNC: 7.9 MG/DL (ref 8.8–10.2)
CHLORIDE SERPL-SCNC: 105 MMOL/L (ref 98–107)
CHLORIDE SERPL-SCNC: 107 MMOL/L (ref 98–107)
CREAT SERPL-MCNC: 0.98 MG/DL (ref 0.51–0.95)
CREAT SERPL-MCNC: 1 MG/DL (ref 0.51–0.95)
DEPRECATED HCO3 PLAS-SCNC: 26 MMOL/L (ref 22–29)
DEPRECATED HCO3 PLAS-SCNC: 26 MMOL/L (ref 22–29)
EOSINOPHIL # BLD AUTO: 0 10E3/UL (ref 0–0.7)
EOSINOPHIL NFR BLD AUTO: 0 %
ERYTHROCYTE [DISTWIDTH] IN BLOOD BY AUTOMATED COUNT: 15.5 % (ref 10–15)
ERYTHROCYTE [DISTWIDTH] IN BLOOD BY AUTOMATED COUNT: 15.5 % (ref 10–15)
GFR SERPL CREATININE-BSD FRML MDRD: 64 ML/MIN/1.73M2
GFR SERPL CREATININE-BSD FRML MDRD: 66 ML/MIN/1.73M2
GLUCOSE BLDC GLUCOMTR-MCNC: 105 MG/DL (ref 70–99)
GLUCOSE BLDC GLUCOMTR-MCNC: 110 MG/DL (ref 70–99)
GLUCOSE BLDC GLUCOMTR-MCNC: 119 MG/DL (ref 70–99)
GLUCOSE BLDC GLUCOMTR-MCNC: 128 MG/DL (ref 70–99)
GLUCOSE BLDC GLUCOMTR-MCNC: 129 MG/DL (ref 70–99)
GLUCOSE BLDC GLUCOMTR-MCNC: 133 MG/DL (ref 70–99)
GLUCOSE BLDC GLUCOMTR-MCNC: 138 MG/DL (ref 70–99)
GLUCOSE BLDC GLUCOMTR-MCNC: 139 MG/DL (ref 70–99)
GLUCOSE BLDC GLUCOMTR-MCNC: 140 MG/DL (ref 70–99)
GLUCOSE BLDC GLUCOMTR-MCNC: 140 MG/DL (ref 70–99)
GLUCOSE BLDC GLUCOMTR-MCNC: 144 MG/DL (ref 70–99)
GLUCOSE BLDC GLUCOMTR-MCNC: 150 MG/DL (ref 70–99)
GLUCOSE BLDC GLUCOMTR-MCNC: 174 MG/DL (ref 70–99)
GLUCOSE BLDC GLUCOMTR-MCNC: 78 MG/DL (ref 70–99)
GLUCOSE SERPL-MCNC: 115 MG/DL (ref 70–99)
GLUCOSE SERPL-MCNC: 190 MG/DL (ref 70–99)
HCO3 BLD-SCNC: 29 MMOL/L (ref 21–28)
HCO3 BLDV-SCNC: 25 MMOL/L (ref 21–28)
HCO3 BLDV-SCNC: 27 MMOL/L (ref 21–28)
HCO3 BLDV-SCNC: 29 MMOL/L (ref 21–28)
HCO3 BLDV-SCNC: 29 MMOL/L (ref 21–28)
HCO3 BLDV-SCNC: 31 MMOL/L (ref 21–28)
HCO3 BLDV-SCNC: 32 MMOL/L (ref 21–28)
HCT VFR BLD AUTO: 28.7 % (ref 35–47)
HCT VFR BLD AUTO: 29.5 % (ref 35–47)
HCV RNA SERPL NAA+PROBE-ACNC: NOT DETECTED IU/ML
HGB BLD-MCNC: 10.2 G/DL (ref 11.7–15.7)
HGB BLD-MCNC: 9.8 G/DL (ref 11.7–15.7)
IMM GRANULOCYTES # BLD: 0.2 10E3/UL
IMM GRANULOCYTES NFR BLD: 1 %
LACTATE SERPL-SCNC: 0.6 MMOL/L (ref 0.7–2)
LACTATE SERPL-SCNC: 0.7 MMOL/L (ref 0.7–2)
LACTATE SERPL-SCNC: 0.8 MMOL/L (ref 0.7–2)
LACTATE SERPL-SCNC: 1.1 MMOL/L (ref 0.7–2)
LACTATE SERPL-SCNC: 1.2 MMOL/L (ref 0.7–2)
LYMPHOCYTES # BLD AUTO: 0.7 10E3/UL (ref 0.8–5.3)
LYMPHOCYTES NFR BLD AUTO: 3 %
MAGNESIUM SERPL-MCNC: 2 MG/DL (ref 1.7–2.3)
MAGNESIUM SERPL-MCNC: 2.9 MG/DL (ref 1.7–2.3)
MCH RBC QN AUTO: 31.2 PG (ref 26.5–33)
MCH RBC QN AUTO: 31.3 PG (ref 26.5–33)
MCHC RBC AUTO-ENTMCNC: 34.1 G/DL (ref 31.5–36.5)
MCHC RBC AUTO-ENTMCNC: 34.6 G/DL (ref 31.5–36.5)
MCV RBC AUTO: 91 FL (ref 78–100)
MCV RBC AUTO: 91 FL (ref 78–100)
MONOCYTES # BLD AUTO: 1.2 10E3/UL (ref 0–1.3)
MONOCYTES NFR BLD AUTO: 5 %
NEUTROPHILS # BLD AUTO: 20.1 10E3/UL (ref 1.6–8.3)
NEUTROPHILS NFR BLD AUTO: 91 %
NRBC # BLD AUTO: 0 10E3/UL
NRBC BLD AUTO-RTO: 0 /100
O2/TOTAL GAS SETTING VFR VENT: 21 %
O2/TOTAL GAS SETTING VFR VENT: 40 %
OXYHGB MFR BLD: 98 % (ref 92–100)
OXYHGB MFR BLD: 99 % (ref 92–100)
OXYHGB MFR BLDV: 61 % (ref 70–75)
OXYHGB MFR BLDV: 61 % (ref 70–75)
OXYHGB MFR BLDV: 68 % (ref 70–75)
OXYHGB MFR BLDV: 69 % (ref 70–75)
OXYHGB MFR BLDV: 70 % (ref 70–75)
OXYHGB MFR BLDV: 73 % (ref 70–75)
PCO2 BLD: 34 MM HG (ref 35–45)
PCO2 BLD: 40 MM HG (ref 35–45)
PCO2 BLD: 51 MM HG (ref 35–45)
PCO2 BLDV: 36 MM HG (ref 40–50)
PCO2 BLDV: 37 MM HG (ref 40–50)
PCO2 BLDV: 41 MM HG (ref 40–50)
PCO2 BLDV: 53 MM HG (ref 40–50)
PCO2 BLDV: 55 MM HG (ref 40–50)
PCO2 BLDV: 64 MM HG (ref 40–50)
PH BLD: 7.37 [PH] (ref 7.35–7.45)
PH BLD: 7.48 [PH] (ref 7.35–7.45)
PH BLD: 7.54 [PH] (ref 7.35–7.45)
PH BLDV: 7.3 [PH] (ref 7.32–7.43)
PH BLDV: 7.35 [PH] (ref 7.32–7.43)
PH BLDV: 7.37 [PH] (ref 7.32–7.43)
PH BLDV: 7.44 [PH] (ref 7.32–7.43)
PH BLDV: 7.46 [PH] (ref 7.32–7.43)
PH BLDV: 7.48 [PH] (ref 7.32–7.43)
PHOSPHATE SERPL-MCNC: 3.8 MG/DL (ref 2.5–4.5)
PHOSPHATE SERPL-MCNC: 3.9 MG/DL (ref 2.5–4.5)
PLATELET # BLD AUTO: 91 10E3/UL (ref 150–450)
PLATELET # BLD AUTO: 98 10E3/UL (ref 150–450)
PO2 BLD: 133 MM HG (ref 80–105)
PO2 BLD: 162 MM HG (ref 80–105)
PO2 BLD: 184 MM HG (ref 80–105)
PO2 BLDV: 34 MM HG (ref 25–47)
PO2 BLDV: 35 MM HG (ref 25–47)
PO2 BLDV: 36 MM HG (ref 25–47)
PO2 BLDV: 37 MM HG (ref 25–47)
PO2 BLDV: 37 MM HG (ref 25–47)
PO2 BLDV: 40 MM HG (ref 25–47)
POTASSIUM SERPL-SCNC: 4 MMOL/L (ref 3.4–5.3)
POTASSIUM SERPL-SCNC: 4.1 MMOL/L (ref 3.4–5.3)
PROT SERPL-MCNC: 4 G/DL (ref 6.4–8.3)
PROT SERPL-MCNC: 4.1 G/DL (ref 6.4–8.3)
RBC # BLD AUTO: 3.14 10E6/UL (ref 3.8–5.2)
RBC # BLD AUTO: 3.26 10E6/UL (ref 3.8–5.2)
SODIUM SERPL-SCNC: 140 MMOL/L (ref 136–145)
SODIUM SERPL-SCNC: 141 MMOL/L (ref 136–145)
TACROLIMUS BLD-MCNC: 3 UG/L (ref 5–15)
TME LAST DOSE: ABNORMAL H
TME LAST DOSE: ABNORMAL H
VANCOMYCIN SERPL-MCNC: 21 UG/ML
WBC # BLD AUTO: 21.5 10E3/UL (ref 4–11)
WBC # BLD AUTO: 22.2 10E3/UL (ref 4–11)

## 2022-06-30 PROCEDURE — 250N000011 HC RX IP 250 OP 636: Performed by: STUDENT IN AN ORGANIZED HEALTH CARE EDUCATION/TRAINING PROGRAM

## 2022-06-30 PROCEDURE — 250N000009 HC RX 250: Performed by: STUDENT IN AN ORGANIZED HEALTH CARE EDUCATION/TRAINING PROGRAM

## 2022-06-30 PROCEDURE — 82040 ASSAY OF SERUM ALBUMIN: CPT | Performed by: STUDENT IN AN ORGANIZED HEALTH CARE EDUCATION/TRAINING PROGRAM

## 2022-06-30 PROCEDURE — 250N000011 HC RX IP 250 OP 636: Performed by: THORACIC SURGERY (CARDIOTHORACIC VASCULAR SURGERY)

## 2022-06-30 PROCEDURE — 250N000011 HC RX IP 250 OP 636: Performed by: PHYSICIAN ASSISTANT

## 2022-06-30 PROCEDURE — 94640 AIRWAY INHALATION TREATMENT: CPT

## 2022-06-30 PROCEDURE — 74018 RADEX ABDOMEN 1 VIEW: CPT | Mod: 26 | Performed by: RADIOLOGY

## 2022-06-30 PROCEDURE — C9113 INJ PANTOPRAZOLE SODIUM, VIA: HCPCS | Performed by: STUDENT IN AN ORGANIZED HEALTH CARE EDUCATION/TRAINING PROGRAM

## 2022-06-30 PROCEDURE — 94003 VENT MGMT INPAT SUBQ DAY: CPT

## 2022-06-30 PROCEDURE — 82805 BLOOD GASES W/O2 SATURATION: CPT | Performed by: THORACIC SURGERY (CARDIOTHORACIC VASCULAR SURGERY)

## 2022-06-30 PROCEDURE — 99233 SBSQ HOSP IP/OBS HIGH 50: CPT | Mod: 24 | Performed by: INTERNAL MEDICINE

## 2022-06-30 PROCEDURE — 80202 ASSAY OF VANCOMYCIN: CPT | Performed by: THORACIC SURGERY (CARDIOTHORACIC VASCULAR SURGERY)

## 2022-06-30 PROCEDURE — 250N000013 HC RX MED GY IP 250 OP 250 PS 637: Performed by: STUDENT IN AN ORGANIZED HEALTH CARE EDUCATION/TRAINING PROGRAM

## 2022-06-30 PROCEDURE — 999N000157 HC STATISTIC RCP TIME EA 10 MIN

## 2022-06-30 PROCEDURE — 258N000003 HC RX IP 258 OP 636: Performed by: STUDENT IN AN ORGANIZED HEALTH CARE EDUCATION/TRAINING PROGRAM

## 2022-06-30 PROCEDURE — 83605 ASSAY OF LACTIC ACID: CPT

## 2022-06-30 PROCEDURE — 83605 ASSAY OF LACTIC ACID: CPT | Performed by: STUDENT IN AN ORGANIZED HEALTH CARE EDUCATION/TRAINING PROGRAM

## 2022-06-30 PROCEDURE — 80053 COMPREHEN METABOLIC PANEL: CPT | Performed by: STUDENT IN AN ORGANIZED HEALTH CARE EDUCATION/TRAINING PROGRAM

## 2022-06-30 PROCEDURE — 71045 X-RAY EXAM CHEST 1 VIEW: CPT | Mod: 26 | Performed by: RADIOLOGY

## 2022-06-30 PROCEDURE — 94640 AIRWAY INHALATION TREATMENT: CPT | Mod: 76

## 2022-06-30 PROCEDURE — 82330 ASSAY OF CALCIUM: CPT | Performed by: STUDENT IN AN ORGANIZED HEALTH CARE EDUCATION/TRAINING PROGRAM

## 2022-06-30 PROCEDURE — 258N000003 HC RX IP 258 OP 636: Performed by: THORACIC SURGERY (CARDIOTHORACIC VASCULAR SURGERY)

## 2022-06-30 PROCEDURE — 87040 BLOOD CULTURE FOR BACTERIA: CPT | Performed by: PHYSICIAN ASSISTANT

## 2022-06-30 PROCEDURE — 83735 ASSAY OF MAGNESIUM: CPT | Performed by: STUDENT IN AN ORGANIZED HEALTH CARE EDUCATION/TRAINING PROGRAM

## 2022-06-30 PROCEDURE — 200N000002 HC R&B ICU UMMC

## 2022-06-30 PROCEDURE — 99291 CRITICAL CARE FIRST HOUR: CPT | Mod: 24 | Performed by: ANESTHESIOLOGY

## 2022-06-30 PROCEDURE — 999N000155 HC STATISTIC RAPCV CVP MONITORING

## 2022-06-30 PROCEDURE — 80197 ASSAY OF TACROLIMUS: CPT | Performed by: INTERNAL MEDICINE

## 2022-06-30 PROCEDURE — 250N000013 HC RX MED GY IP 250 OP 250 PS 637: Performed by: PHYSICIAN ASSISTANT

## 2022-06-30 PROCEDURE — 71045 X-RAY EXAM CHEST 1 VIEW: CPT

## 2022-06-30 PROCEDURE — 250N000012 HC RX MED GY IP 250 OP 636 PS 637: Performed by: PHYSICIAN ASSISTANT

## 2022-06-30 PROCEDURE — 999N000015 HC STATISTIC ARTERIAL MONITORING DAILY

## 2022-06-30 PROCEDURE — 84100 ASSAY OF PHOSPHORUS: CPT | Performed by: STUDENT IN AN ORGANIZED HEALTH CARE EDUCATION/TRAINING PROGRAM

## 2022-06-30 PROCEDURE — 85004 AUTOMATED DIFF WBC COUNT: CPT | Performed by: STUDENT IN AN ORGANIZED HEALTH CARE EDUCATION/TRAINING PROGRAM

## 2022-06-30 PROCEDURE — 36415 COLL VENOUS BLD VENIPUNCTURE: CPT | Performed by: PHYSICIAN ASSISTANT

## 2022-06-30 PROCEDURE — 82803 BLOOD GASES ANY COMBINATION: CPT | Performed by: STUDENT IN AN ORGANIZED HEALTH CARE EDUCATION/TRAINING PROGRAM

## 2022-06-30 PROCEDURE — 94668 MNPJ CHEST WALL SBSQ: CPT

## 2022-06-30 PROCEDURE — 74018 RADEX ABDOMEN 1 VIEW: CPT

## 2022-06-30 PROCEDURE — 82140 ASSAY OF AMMONIA: CPT | Performed by: INTERNAL MEDICINE

## 2022-06-30 PROCEDURE — 250N000012 HC RX MED GY IP 250 OP 636 PS 637: Performed by: STUDENT IN AN ORGANIZED HEALTH CARE EDUCATION/TRAINING PROGRAM

## 2022-06-30 PROCEDURE — 250N000012 HC RX MED GY IP 250 OP 636 PS 637: Performed by: INTERNAL MEDICINE

## 2022-06-30 PROCEDURE — 999N000045 HC STATISTIC DAILY SWAN MONITORING

## 2022-06-30 PROCEDURE — 999N000065 XR ABDOMEN PORT 1 VIEWS

## 2022-06-30 PROCEDURE — 82805 BLOOD GASES W/O2 SATURATION: CPT | Performed by: PHYSICIAN ASSISTANT

## 2022-06-30 PROCEDURE — 94667 MNPJ CHEST WALL 1ST: CPT

## 2022-06-30 PROCEDURE — 82248 BILIRUBIN DIRECT: CPT | Performed by: STUDENT IN AN ORGANIZED HEALTH CARE EDUCATION/TRAINING PROGRAM

## 2022-06-30 RX ORDER — TACROLIMUS 0.5 MG/1
2 CAPSULE ORAL
Status: COMPLETED | OUTPATIENT
Start: 2022-06-30 | End: 2022-07-03

## 2022-06-30 RX ORDER — MAGNESIUM SULFATE HEPTAHYDRATE 40 MG/ML
2 INJECTION, SOLUTION INTRAVENOUS ONCE
Status: COMPLETED | OUTPATIENT
Start: 2022-06-30 | End: 2022-06-30

## 2022-06-30 RX ADMIN — MYCOPHENOLATE MOFETIL 1500 MG: 200 POWDER, FOR SUSPENSION ORAL at 07:53

## 2022-06-30 RX ADMIN — DEXMEDETOMIDINE HYDROCHLORIDE 0.7 MCG/KG/HR: 400 INJECTION INTRAVENOUS at 13:51

## 2022-06-30 RX ADMIN — GABAPENTIN 100 MG: 100 CAPSULE ORAL at 13:11

## 2022-06-30 RX ADMIN — PREDNISOLONE 17.5 MG: 15 SOLUTION ORAL at 07:52

## 2022-06-30 RX ADMIN — LEVALBUTEROL HYDROCHLORIDE 1.25 MG: 1.25 SOLUTION RESPIRATORY (INHALATION) at 13:07

## 2022-06-30 RX ADMIN — LEVALBUTEROL HYDROCHLORIDE 1.25 MG: 1.25 SOLUTION RESPIRATORY (INHALATION) at 16:51

## 2022-06-30 RX ADMIN — HEPARIN SODIUM 5000 UNITS: 5000 INJECTION, SOLUTION INTRAVENOUS; SUBCUTANEOUS at 13:11

## 2022-06-30 RX ADMIN — PREDNISOLONE 17.5 MG: 15 SOLUTION ORAL at 20:03

## 2022-06-30 RX ADMIN — SODIUM CHLORIDE, POTASSIUM CHLORIDE, SODIUM LACTATE AND CALCIUM CHLORIDE 500 ML: 600; 310; 30; 20 INJECTION, SOLUTION INTRAVENOUS at 06:00

## 2022-06-30 RX ADMIN — ACETYLCYSTEINE 2 ML: 200 SOLUTION ORAL; RESPIRATORY (INHALATION) at 08:37

## 2022-06-30 RX ADMIN — SENNOSIDES AND DOCUSATE SODIUM 1 TABLET: 8.6; 5 TABLET ORAL at 20:03

## 2022-06-30 RX ADMIN — ACETYLCYSTEINE 2 ML: 200 SOLUTION ORAL; RESPIRATORY (INHALATION) at 20:36

## 2022-06-30 RX ADMIN — PREDNISOLONE 17.5 MG: 15 SOLUTION ORAL at 02:08

## 2022-06-30 RX ADMIN — LEVALBUTEROL HYDROCHLORIDE 1.25 MG: 1.25 SOLUTION RESPIRATORY (INHALATION) at 20:36

## 2022-06-30 RX ADMIN — GABAPENTIN 100 MG: 100 CAPSULE ORAL at 07:53

## 2022-06-30 RX ADMIN — VANCOMYCIN HYDROCHLORIDE 1250 MG: 10 INJECTION, POWDER, LYOPHILIZED, FOR SOLUTION INTRAVENOUS at 20:03

## 2022-06-30 RX ADMIN — NYSTATIN 1000000 UNITS: 100000 SUSPENSION ORAL at 20:03

## 2022-06-30 RX ADMIN — PROPOFOL 25 MCG/KG/MIN: 10 INJECTION, EMULSION INTRAVENOUS at 00:17

## 2022-06-30 RX ADMIN — NYSTATIN 1000000 UNITS: 100000 SUSPENSION ORAL at 17:05

## 2022-06-30 RX ADMIN — CEFTAZIDIME 2 G: 2 INJECTION, POWDER, FOR SOLUTION INTRAVENOUS at 07:53

## 2022-06-30 RX ADMIN — NYSTATIN 1000000 UNITS: 100000 SUSPENSION ORAL at 07:53

## 2022-06-30 RX ADMIN — Medication 1 PACKET: at 07:53

## 2022-06-30 RX ADMIN — MULTIVITAMIN 15 ML: LIQUID ORAL at 07:54

## 2022-06-30 RX ADMIN — HEPARIN SODIUM 5000 UNITS: 5000 INJECTION, SOLUTION INTRAVENOUS; SUBCUTANEOUS at 05:43

## 2022-06-30 RX ADMIN — OXYCODONE HYDROCHLORIDE 10 MG: 10 TABLET ORAL at 11:56

## 2022-06-30 RX ADMIN — CEFTAZIDIME 2 G: 2 INJECTION, POWDER, FOR SOLUTION INTRAVENOUS at 16:24

## 2022-06-30 RX ADMIN — ACETAMINOPHEN 975 MG: 325 TABLET, FILM COATED ORAL at 13:11

## 2022-06-30 RX ADMIN — LEVALBUTEROL HYDROCHLORIDE 1.25 MG: 1.25 SOLUTION RESPIRATORY (INHALATION) at 08:37

## 2022-06-30 RX ADMIN — MYCOPHENOLATE MOFETIL 1500 MG: 200 POWDER, FOR SUSPENSION ORAL at 20:03

## 2022-06-30 RX ADMIN — ACETAMINOPHEN 975 MG: 325 TABLET, FILM COATED ORAL at 05:43

## 2022-06-30 RX ADMIN — ACETYLCYSTEINE 2 ML: 200 SOLUTION ORAL; RESPIRATORY (INHALATION) at 13:07

## 2022-06-30 RX ADMIN — DEXMEDETOMIDINE HYDROCHLORIDE 0.6 MCG/KG/HR: 400 INJECTION INTRAVENOUS at 00:17

## 2022-06-30 RX ADMIN — POLYETHYLENE GLYCOL 3350 17 G: 17 POWDER, FOR SOLUTION ORAL at 07:53

## 2022-06-30 RX ADMIN — NYSTATIN 1000000 UNITS: 100000 SUSPENSION ORAL at 11:57

## 2022-06-30 RX ADMIN — ACETYLCYSTEINE 2 ML: 200 SOLUTION ORAL; RESPIRATORY (INHALATION) at 16:51

## 2022-06-30 RX ADMIN — PANTOPRAZOLE SODIUM 40 MG: 40 INJECTION, POWDER, FOR SOLUTION INTRAVENOUS at 07:53

## 2022-06-30 RX ADMIN — OXYCODONE HYDROCHLORIDE 10 MG: 10 TABLET ORAL at 17:05

## 2022-06-30 RX ADMIN — TACROLIMUS 1 MG: 1 CAPSULE ORAL at 08:08

## 2022-06-30 RX ADMIN — SENNOSIDES AND DOCUSATE SODIUM 1 TABLET: 8.6; 5 TABLET ORAL at 07:53

## 2022-06-30 RX ADMIN — ACETAMINOPHEN 975 MG: 325 TABLET, FILM COATED ORAL at 22:06

## 2022-06-30 RX ADMIN — HEPARIN SODIUM 5000 UNITS: 5000 INJECTION, SOLUTION INTRAVENOUS; SUBCUTANEOUS at 22:05

## 2022-06-30 RX ADMIN — MAGNESIUM SULFATE IN WATER 2 G: 40 INJECTION, SOLUTION INTRAVENOUS at 02:06

## 2022-06-30 RX ADMIN — OXYCODONE HYDROCHLORIDE 10 MG: 10 TABLET ORAL at 07:53

## 2022-06-30 RX ADMIN — GABAPENTIN 100 MG: 100 CAPSULE ORAL at 20:03

## 2022-06-30 RX ADMIN — TACROLIMUS 2 MG: 1 CAPSULE ORAL at 17:59

## 2022-06-30 ASSESSMENT — ACTIVITIES OF DAILY LIVING (ADL)
ADLS_ACUITY_SCORE: 29
ADLS_ACUITY_SCORE: 27
ADLS_ACUITY_SCORE: 29
ADLS_ACUITY_SCORE: 27
ADLS_ACUITY_SCORE: 27

## 2022-06-30 NOTE — PLAN OF CARE
Major Shift Events:  Assumed care of pt @ 1500. Pt VSS, afebrile and A&Ox4. Pt was extubated @ 1455 and is now on 4L NC and tolerating it well. Pt is on levo at 0.02 to maintain MAP>65. Monitoring hemodynamics via swan. [place]bago is at 53cm. Pt has insulin gtt running. Pt has minimal output from chest tubes. Pt having low UO at this time. Pt wound vac is clean dry and intact with no output. TF are held at this time due to feeding tube not being post pyloric. Pt has minimal complaints of pain, giving prn pain meds as needed.     Plan: Continue POC, wean off of levo as tolerated.     For vital signs and complete assessments, please see documentation flowsheets.

## 2022-06-30 NOTE — PROGRESS NOTES
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 Sunshine.    TODAY'S PROGRESS:   - Plan to wean off vent and try for extubation today  - Elevated temps and white count, sent blood cultures and continuing postsurgical abx  - Attempting to advance feeding tube past pylorus  - Off epi, on norepinephrine drip  - Precedex started for agitation  - Consider transition to sliding scale insulin tomorrow AM    PLAN:  Neuro/ pain/ sedation:  Acute postoperative pain  - Monitor neurological status. Notify the MD for any acute changes in exam.  - Fentanyl drip for pain while intubated  - Scheduled: acetaminophen, robaxin, gabapentin.   PRN: oxycodone, dilaudid  - Erector spinae catheters placed 6/29  - Sedation: propofol gtt --> discontinued 6/30 AM  - Precedex drip started 6/29 for agitation and tremor     Pulmonary:  #Postoperative ventilatory support  #Bilateral Lung Transplant  #Hx COPD  - Titrate FiO2 for SpO2 >92%  - Intubated, ventilated --> Plan to extubate 6/30  - Pulmonary hygiene: Incentive spirometer every 15- 30 minutes when awake, flutter valve, C&DB, airway clearance once extubated  - Basiliximab POD #0 and #4, methylprednisolone, mycophenolate, tacrolimus  - Valganciclovir  - Small lungs to chest - 30-40 degree head up  - Chest tubes with serosanguinous output     Cardiovascular:  #Hx HTN  #Hx HFpEF  - Monitor hemodynamic status.   - Goal MAP >65  - Epi gtt discontinued 6/29  - Norepinephrine drip, wean as tolerated     GI care/ Nutrition:   - NPO  - NJ not advanced past pylorus, plan to re-attempt  - PPI: protonix  - Continue bowel regimen: miralax; PRN moM, bisacodyl supp    Renal/ Fluid Balance/ Electrolytes:   BL creat appears to be ~ 0.85  - Strict I/O, daily weights  - Avoid/limit nephrotoxins as  "able  - Replete lytes PRN per protocol     Endocrine:    Stress induced hyperglycemia  - Insulin gtt   - Goal BG <180 for optimal healing     ID/ Antibiotics:  Stress-induced leukocytosis  - Continue to monitor fever curve, WBC and inflammatory markers as appropriate  - Prophylaxis: POD #8-90 nystatin, TMP/SMX, valganciclovir  - Post-op abx: Vancomycin, ceftazadime extended to 14 day coverage given elevated temp and WBCs  - Post-op cultures:   - Gram stain (1+ G+ cocci)   - Resp aerobic cx (4+ G- bacilli)   - Fungal cx (NGTD)   - AFB cx (NGTD)  - Blood cx sent 6/30     Heme:     Acute blood loss anemia  Acute blood loss thrombocytopenia  - S/p pRBCs x2 6/29     MSK/ Skin:  Sternotomy  Surgical Incision  - Sternal precautions  - Postoperative incision management per protocol  - PT/OT/CR     Prophylaxis:    - Mechanical prophylaxis for DVT  - Chemical DVT prophylaxis - subQ heparin 5000u  - PPI      Lines/ tubes/ drains:  - Arterial Line, ETT, CTs x5, PA catheter, RIJ, montgomery, NG, PIV x2  - 4 Pleural, 1 med     Disposition:  - CVICU    Patient seen, findings and plan discussed with CV ICU staff, Dr. Leon.    -----------------------------------  Cherie Clifton, MS4  [facility] CVICU    Resident/Fellow Attestation   I was present with the student who participated in the service and in the documentation of the   note. I have verified the history and personally performed the physical exam and medical decisionmaking. I agree with the assessment and plan of care as documented in the note.\" Signed & Dated: Dr. Andres Davis MD            ====================================    SUBJECTIVE:   Doing well post-op, off of sedation. She has been experiencing some anxiety and agitation. She is initiating breaths and working toward extubation, having some pain with deep inspiration.     OBJECTIVE:   1. VITAL SIGNS:   Temp:  [91.6  F (33.1  C)-100.8  F (38.2  C)] 100.6  F (38.1  C)  Pulse:  [] 81  Resp:  [14-24] 16  BP: (116)/(56) " 116/56  MAP:  [50 mmHg-85 mmHg] 70 mmHg  Arterial Line BP: ()/(38-74) 86/59  FiO2 (%):  [40 %] 40 %  SpO2:  [90 %-100 %] 100 %  Vent Mode: CMV/AC  (Continuous Mandatory Ventilation/ Assist Control)  FiO2 (%): 40 %  Resp Rate (Set): 14 breaths/min  Tidal Volume (Set, mL): 390 mL  PEEP (cm H2O): 6 cmH2O  Resp: 16      2. INTAKE/ OUTPUT:   I/O last 3 completed shifts:  In: 9810.31 [I.V.:5749.31; Other:750; NG/GT:383; IV Piggyback:1750]  Out: 4297 [Urine:1957; Emesis/NG output:150; Chest Tube:2190]    3. PHYSICAL EXAMINATION:   General: Awake, alert, and intubated in bed  Pulm: Initiating ventilator breaths  Neuro: Communicating with gestures and writing, moving extremities    4. INVESTIGATIONS:   Arterial Blood Gases   Recent Labs   Lab 06/30/22  0356 06/29/22  2339 06/29/22  1645 06/29/22  0535   PH 7.54* 7.48* 7.46* 7.41   PCO2 34* 40 41 40   PO2 184* 162* 196* 163*   HCO3 29* 29* 29* 25     Complete Blood Count   Recent Labs   Lab 06/30/22  0355 06/29/22  2341 06/29/22  1632 06/29/22  1007 06/29/22  0536 06/29/22  0248   WBC 22.2* 21.5*  --   --  16.2* 12.6*   HGB 9.8* 10.2* 9.8* 6.8* 7.6* 9.0*   PLT 91* 98*  --   --  103* 124*     Basic Metabolic Panel  Recent Labs   Lab 06/30/22  0547 06/30/22  0401 06/30/22  0355 06/30/22  0159 06/29/22  2351 06/29/22  2341 06/29/22  0250 06/29/22  0248 06/29/22  0141 06/28/22  1745 06/28/22  1258   NA  --   --  141  --   --  140  --  139 140   < > 139   POTASSIUM  --   --  4.0  --   --  4.1  --  4.0 3.6   < > 4.2   CHLORIDE  --   --  107  --   --  105  --  100  --   --  93*   CO2  --   --  26  --   --  26  --  24  --   --  35*   BUN  --   --  19.6  --   --  20.6  --  16.5  --   --  14.2   CR  --   --  0.98*  --   --  1.00*  --  0.94  --   --  0.85   * 110* 115* 150*   < > 190*   < > 159* 115*   < > 96    < > = values in this interval not displayed.     Liver Function Tests  Recent Labs   Lab 06/30/22  0355 06/29/22  2341 06/29/22  0248 06/29/22  0007 06/28/22  1258    AST 65* 78* 143*  --  30   ALT 23 27 33  --  16   ALKPHOS 35 40 36  --  86   BILITOTAL 0.3 0.3 0.9  --  0.4   ALBUMIN 2.6* 2.8* 2.3*  --  5.3*   INR  --   --  1.33* 1.60* 0.91     Pancreatic Enzymes  No lab results found in last 7 days.  Coagulation Profile  Recent Labs   Lab 06/29/22  0248 06/29/22  0007 06/28/22  1258   INR 1.33* 1.60* 0.91   PTT 52* 32 29         5. RADIOLOGY:   Recent Results (from the past 24 hour(s))   POC US Guidance Needle Placement    Impression    Bilateral CLARI catheters   XR Abdomen Port 1 View    Narrative    EXAMINATION:  XR ABDOMEN PORT 1 VIEWS 6/29/2022 12:27 PM     COMPARISON: PET 7/14/2021.    HISTORY: Verify small bowel feeding tube bedside placement.    FINDINGS: Frontal view of the abdomen. Feeding tube tip likely  projects over distended distal stomach. Bibasilar chest tubes.  [place] catheter tip projects over the main pulmonary artery. No  abnormally dilated loops of bowel. Postsurgical changes of bilateral  lung transplantation with clamshell sternotomy wires.       Impression    IMPRESSION: Feeding tube tip overlying the central abdomen likely  projecting over distended distal stomach.    I have personally reviewed the examination and initial interpretation  and I agree with the findings.    NIKOS JAVED MD         SYSTEM ID:  [id number]   XR Chest Port 1 View    Narrative    EXAM: XR CHEST PORT 1 VIEW  6/29/2022 5:22 PM     HISTORY:  lung txp       COMPARISON:  Radiograph earlier same day, 6/20/2022.    TECHNIQUE: AP view of the chest at 60 degrees    FINDINGS:   Devices, lines, tubes: Endotracheal tube, bilateral chest tubes,  pericardial drain, and right internal jugular [place] catheter are  in similar positioning. Interval placement of a bulge is a catheters.  Feeding tube tip coursing inferior to the left hemidiaphragm with tip  out of the field of view. Clamshell sternotomy wires are intact.    Postoperative changes of bilateral lung transplantation. The  trachea  is midline. The cardiomediastinal silhouette is within normal limits.  Similar perihilar and bibasilar streaky opacities..  No appreciable  pneumothorax, pleural effusion, or focal consolidative opacity. No  acute osseous abnormality.  Decreased amount of percutaneous emphysema  projecting over the lateral chest walls.      Impression    IMPRESSION:   1. Similar perihilar and bibasilar streaky opacities, favoring  postoperative atelectasis and/or edema.  2. No appreciable pneumothoraces with bilateral chest tubes in place.    I have personally reviewed the examination and initial interpretation  and I agree with the findings.    MONSERRAT SEGAL MD         SYSTEM ID:  [id number]   XR Chest Port 1 View    Impression    RESIDENT PRELIMINARY INTERPRETATION  IMPRESSION:   1. Stable support devices.  2. Largely unchanged perihilar and left basilar opacities with small  left pleural effusion.    XR Abdomen Port 1 View    Impression    RESIDENT PRELIMINARY INTERPRETATION  IMPRESSION:   Feeding tube tip over the distal distended stomach. Recommend  repositioning if postpyloric is desired.       =========================================

## 2022-06-30 NOTE — PROGRESS NOTES
"Pain Service Progress Note  [facility]  Date: 06/30/2022       Patient Name: Sofie Rodriguez  MRN: [id number]  Age: 62 year old  Sex: female      Assessment:  Sofie Rodriguez is a 62 year old female with a PMH significant for end stage COPD, HTN, Hep C, and osteopenia as well as former methamphetamine use.  Pt. 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     Procedure: bilateral lung transplant    Date of Surgery: 6/28/22    Date of Catheter Placement: 6/29/22    Plan/Recommendations:  1. Regional Anesthesia/Analgesia  -Continuous Catheter Type/Site: bilateral erector spinae (ES) T5-6  -Continue 0.2% ropivacaine    -Programmed Intermittent Bolus (PIB) at 7 mL Q60 min via each catheter, total infusion rate of 14 mL/hr    Plan to maintain catheter, max of 7 days    2. Anticoagulation  -heparin ANTICOAGULANT injection 5,000 Units, SC, Q8H TONY     -Please contact Inpatient Pain Service before ordering or making any anticoagulation changes       3. Multimodal Analgesia  - per primary service    Pain Service will continue to follow.    Discussed with attending anesthesiologist    Please Page the Pain Team Via [employer]: \"Adult acute inpatient pain management/[facility]\"    Lesvia Camargo PA-C  06/30/2022     Overnight Events: remained intubated.    Tubes/Drains: Yes      Subjective: She denies having pain.  Tolerating pressure support this morning.  Awake, alert,and oriented.  able to communicate by writing, nodding head and gestures.      Symptoms of LAST: No    Pain Location:  Denies pain    Pain Intensity:    Pain at Rest: 0/10     Satisfied with your level of pain control: Yes    Diet: NPO for Medical/Clinical Reasons Except for: No Exceptions  Adult Formula Drip Feeding: Continuous Osmolite 1.5; Nasogastric tube; Goal Rate: 45; initiate at 15 ml/hr and advance by 15 ml/hr q8h; do not initiate until FT placement verified; mL/hr; Medication - Feeding Tube Flush Frequency: At least 15-30 m...    Relevant Labs:  Recent " "Labs   Lab Test 06/30/22  0355 06/29/22  0536 06/29/22  0248   INR  --   --  1.33*   PLT 91*   < > 124*   PTT  --   --  52*   BUN 19.6   < > 16.5    < > = values in this interval not displayed.       Physical Exam:  Vitals: /56   Pulse 79   Temp (!) 96.4  F (35.8  C)   Resp 22   Ht 1.575 m (5' 2\")   Wt 75.6 kg (166 lb 10.7 oz)   SpO2 99%   BMI 30.48 kg/m      Physical Exam:   Orientation:  Alert, oriented, and in no acute distress: Yes  Sedation: No    Motor Examination:  5/5 Strength in lower extremities: No      Catheter Site:   Catheter entry site is clean/dry/intact: RN reports site is c,d,i.          Relevant Medications:  Current Pain Medications:  Medications related to Pain Management (From now, onward)    Start     Dose/Rate Route Frequency Ordered Stop    07/01/22 0000  acetaminophen (TYLENOL) tablet 650 mg         650 mg Oral EVERY 4 HOURS PRN 06/29/22 0240      06/29/22 1930  dexmedetomidine (PRECEDEX) 400 mcg in 0.9% sodium chloride 100 mL         0.1-1.2 mcg/kg/hr × 65.7 kg (Dosing Weight)  1.6-19.7 mL/hr  Intravenous CONTINUOUS 06/29/22 1926      06/29/22 1200  ropivacaine 0.2% in NS perineural infusion simple          Perineural Continuous Nerve Block 06/29/22 1042      06/29/22 1200  ropivacaine 0.2% in NS perineural infusion simple          Perineural Continuous Nerve Block 06/29/22 1042      06/29/22 0800  senna-docusate (SENOKOT-S/PERICOLACE) 8.6-50 MG per tablet 1 tablet         1 tablet Oral 2 TIMES DAILY 06/29/22 0240      06/29/22 0800  polyethylene glycol (MIRALAX) Packet 17 g         17 g Oral DAILY 06/29/22 0240      06/29/22 0800  gabapentin (NEURONTIN) capsule 100 mg         100 mg Oral 3 TIMES DAILY 06/29/22 0240      06/29/22 0330  fentaNYL (SUBLIMAZE) infusion          mcg/hr  1-2 mL/hr  Intravenous CONTINUOUS 06/29/22 0301 06/29/22 0330  propofol (DIPRIVAN) infusion         5-75 mcg/kg/min × 65.7 kg  2-29.6 mL/hr  Intravenous CONTINUOUS 06/29/22 0301      " "06/29/22 0239  acetaminophen (TYLENOL) tablet 975 mg         975 mg Oral EVERY 8 HOURS 06/29/22 0240 07/02/22 0559    06/29/22 0239  magnesium hydroxide (MILK OF MAGNESIA) suspension 30 mL         30 mL Oral DAILY PRN 06/29/22 0240      06/29/22 0239  bisacodyl (DULCOLAX) suppository 10 mg         10 mg Rectal DAILY PRN 06/29/22 0240      06/29/22 0239  HYDROmorphone (DILAUDID) injection 0.2 mg        \"Or\" Linked Group Details    0.2 mg Intravenous EVERY 2 HOURS PRN 06/29/22 0240      06/29/22 0239  HYDROmorphone (DILAUDID) injection 0.4 mg        \"Or\" Linked Group Details    0.4 mg Intravenous EVERY 2 HOURS PRN 06/29/22 0240      06/29/22 0239  oxyCODONE (ROXICODONE) tablet 5 mg        \"Or\" Linked Group Details    5 mg Oral EVERY 4 HOURS PRN 06/29/22 0240      06/29/22 0239  oxyCODONE IR (ROXICODONE) tablet 10 mg        \"Or\" Linked Group Details    10 mg Oral EVERY 4 HOURS PRN 06/29/22 0240      06/29/22 0239  lidocaine 1 % 0.1-1 mL         0.1-1 mL Other EVERY 1 HOUR PRN 06/29/22 0240      06/29/22 0239  lidocaine (LMX4) cream          Topical EVERY 1 HOUR PRN 06/29/22 0240                  Time/Communication:  I personally spent 20 minutes with greater than 50% in consultation, education, counseling and coordination of care.  See note above for details on conversation.        "

## 2022-06-30 NOTE — PLAN OF CARE
Goal Outcome Evaluation:    Plan of Care Reviewed With: patient     Overall Patient Progress: improving    Outcome Evaluation: tolerating post-op progrssion. Weaning support as tolerated and instructed by team.    Major Shift Events:    Intermittent tremor and restlessness, Precedex added in anticipation on weaning propofol and ventilator in morning. Fentanyl increased for comfort and rest. Epi weaned off, continues on levophed infusion for MAP>65.Bolus 500ml LR for low urine out put and hypotension with position changes. Febrile TMAX 38.2. Feeding tube xray completed, TF on hold pending read. Daughter Charity updated via telephone. PST at 0600 with low tidal/minute volumes, switched back to CMV. ABG results relayed to CVTS.       Plan:   Plan to PST and wean ventilator in the morning if tolerated. Daughters to visit during the day. Feeding tube xray completed overnight with daily, read pending, may start tube feeding if positioning deemed adequate       Patient will continue to be monitored and assessed. Please see MAR, flow sheets, and remainder of chart for further details. .

## 2022-06-30 NOTE — PHARMACY-VANCOMYCIN DOSING SERVICE
Pharmacy Vancomycin Initial Note  Date of Service 2022  Patient's  1960  62 year old, female    Indication: Surgical Prophylaxis    Current estimated CrCl = Estimated Creatinine Clearance: 58.1 mL/min (A) (based on SCr of 0.98 mg/dL (H)).    Creatinine for last 3 days  2022: 12:58 PM Creatinine 0.85 mg/dL  2022:  2:48 AM Creatinine 0.94 mg/dL; 11:41 PM Creatinine 1.00 mg/dL  2022:  3:55 AM Creatinine 0.98 mg/dL    Recent Vancomycin Level(s) for last 3 days  No results found for requested labs within last 72 hours.      Vancomycin IV Administrations (past 72 hours)                   vancomycin (VANCOCIN) 1000 mg in dextrose 5% 200 mL PREMIX (mg) 1,000 mg New Bag 22     1,000 mg New Bag  0955    vancomycin (VANCOCIN) 1000 mg in dextrose 5% 200 mL PREMIX (mg) 1,000 mg Given 22                Nephrotoxins and other renal medications (From now, onward)    Start     Dose/Rate Route Frequency Ordered Stop    22 0800  vancomycin (VANCOCIN) 1000 mg in dextrose 5% 200 mL PREMIX         1,000 mg  200 mL/hr over 1 Hours Intravenous EVERY 12 HOURS 22 0240 22 0959    22 0800  tacrolimus (GENERIC EQUIVALENT) PO capsule for SL USE 1 mg         1 mg Sublingual 2 TIMES DAILY. 22 0240      22 0300  norepinephrine (LEVOPHED) 16 mg in  mL infusion MAX CONC CENTRAL LINE         0.01-0.6 mcg/kg/min × 65.7 kg  0.6-37 mL/hr  Intravenous CONTINUOUS 22 0256            Contrast Orders - past 72 hours (72h ago, onward)    None          [employer] Prediction of Planned Initial Vancomycin Regimen  Regimen: 1250 mg IV every 24 hours.  Start time: 10:40 on 2022  Exposure target: AUC24 (range)400-600 mg/L.hr   AUC24,ss: 418 mg/L.hr  Probability of AUC24 > 400: 55 %  Ctrough,ss: 11.6 mg/L  Probability of Ctrough,ss > 20: 14 %  Probability of nephrotoxicity (Lodise IVAN ): 7 %          Plan:  1. reduce vancomycin from vinayak-op 1000 mg IV q12h to  1250 mg IV q24h   2. Vancomycin monitoring method: AUC  3. Vancomycin therapeutic monitoring goal: 400-600 mg*h/L  4. Pharmacy will check vancomycin levels as appropriate in 1-3 Days.    5. Serum creatinine levels will be ordered daily for the first week of therapy and at least twice weekly for subsequent weeks.      Valeria Alva RPH

## 2022-06-30 NOTE — PROGRESS NOTES
Pulmonary Medicine  Cystic Fibrosis - Lung Transplant Team  Progress Note  2022       Patient: Sofie Rodriguez  MRN: [id number]  : 1960 (age 62 year old)  Transplant: 2022 (Lung), POD#2  Admission date: 2022    Assessment & Plan:     Sofie Rodriguez is a 62 year old female with a PMH significant for end stage COPD, HTN, Hep C, and osteopenia as well as former methamphetamine use.  Pt. is now s/p BSLT on 22.  Surgery on CPB, uncomplicated, lungs slightly undersized for chest. Did require some moderate volume resuscitation with low dose pressors post transplant.     Recommendations today:   - Increase tacro to 2 mg bid (ordered for you)  - repeat gas while on PST  - If able to tolerate PST on  with appropriate gas, okay to extubate  - Would be judicious with fluid resuscitation going forward  - Appreciate primary team attempt to reposition NJ    S/p bilateral sequential lung transplant (BSLT) for end stage COPD:  Acute on chronic hypoxic respiratory failure: Patient is currently on pressors but with excellent oxygenation. Pressors weaned down and working on PST today with plan for potential extubation in next 24 hours.   - Nebs: levalbuterol and Mucomyst QID  - Aggressive pulmonary toilet with chest physiotherapy QID (addition of Aerobika and incentive spirometry once extubated)  - DSA on  (ordered) then one month post-transplant  - Ammonia monitoring every 48 hours x 3 weeks (screening for hyperammonemia post-lung transplant)  - Ventilator management per ICU team  - Bronch  with patent airways, no significant ischemic reperfusion injury, no significant edema, occasional mucous plugs in lower lobes bilaterally but removed upon therapeutic suctioning.   - PVTS catheters placed   - Chest tubes managed by surgical team  - Daily CXR: reviewed, very clear lungs bilaterally, no significant infiltrates.   - Post-pyloric feeding tube placement as soon able for enteral nutrition,  trickle feed rate only (max 20 ml/hr) with gastric access  - SLP consult as pt. nears extubation for swallowing evaluation  - Await explant pathology    Immunosuppression:  Induction therapy with basiliximab (and high dose IV steroid) given intraoperatively, repeating basiliximab dose on POD#4 (ordered for 7/2).  - Tacrolimus 1 mg SL BID.  Goal tacrolimus level 8-12. Trending daily levels  Date Tacro Level Intervention   6/29 <1.0 Continue current dose, 1 mg bid   6/30 3.0 Increase to 2 mg bid        - MMF 1500 mg BID  - Methylprednisolone 125 mg x 3 doses, then prednisolone BID with taper per lung transplant protocol:  Date AM dose (mg) PM dose (mg)   6/30 17.5 17.5   7/7 15 15   7/14 15 12.5   7/21 12.5 12.5   8/4 12.5 10   8/18 10 10   9/15 10 7.5   10/13 7.5 7.5   11/10 7.5 5   12/8 5 5   1/5/23 5 2.5     Prophylaxis:   - Bactrim for PJP ppx deferred initially post-op   - VGCV for CMV ppx (as below to start on 7/6)  - Nystatin for oral candidiasis ppx, 6 month course  - See below for serologies and viral ppx:   Donor Recipient Intervention   CMV status + + Valganciclovir POD #8-90   EBV status + + EBV check every 3 months   HSV status N/A + No Acyclovir prophylaxis     Primary graft dysfunction (per ISHLT guidelines):  See chart below:   POD #0  (~0 hours) POD #1  (~24 hours) POD #2   (~48 hours) POD#3   (~72 hours)   Date 6/29/22 6/30/22     Time 0535 0356     Intubated Y Y Y/N Y/N   PaO2 163 184     FiO2 40% 40     P/F Ratio 408 460     PGD Grade   (0=mild, 3=severe) 0 0     ECMO N N Y/N Y/N   Inhaled NO/Flolan N N Y/N Y/N   ISHLT PGD Scoring  Grade 0 - PaO2/FiO2 >300 and normal chest radiograph (absence of diffuse allograft infiltrates)  Grade 1 - PaO2/FiO2 >300 and diffuse allograft infiltrates on chest radiograph  Grade 2 - PaO2/FiO2 between 200 and 300  Grade 3 - PaO2/FiO2 <200 and/or on ECMO    ID: Prior history of colonization with Mycobacterium peregrinum    Fevers: Febrile to 100.6 on 6/30.   - Repeat  "blood cultures   - Continue Iv ceftaz/vanc x 14 day course empirically based on recipient gram stain and fever  - AFB to be sent on all future bronchs  - IgG at one month  - Donor cultures: No growth to date  - Recipient cultures: >25 PMNs, 1+GPCs.    H/O Hep C: C: Diagnosed in 1980s, 2 mos of treatment, quant negative since 10/2017, last positive 2/20/17 ([phone]).Glenis positive on 6/2021 with negative HCV PCR. Hx of remote ETOH abuse. MR Elastography 4/27/21 with hepatology review and consult without any concerns post transplant.     History of steroid induced hyperglycemia: Well controlled in outpatient. Management by primary team, may need endo consult prior to discharge.     We appreciate the excellent care provided by the CVTS and CVICU teams.  Recommendations communicated via in person rounding and this note.  Will continue to follow along closely, please do not hesitate to call with any questions or concerns.      We appreciate the excellent care provided by the CVTS team.  Recommendations communicated via in person rounding and this note.  Will continue to follow along closely, please do not hesitate to call with any questions or concerns.    Claribel Franks MD on 6/30/2022 at 12:23 PM     Subjective & Interval History:   Febrile overnight to 100.6. She reports mild discomfort with deep breathing. She denies any nausea, not passing gas yet. Did feel like she couldn't get quite \"enough air\" when doing PST on 8/5, increased to 10/5. She is awake, alert, and interactive. Pressors able to be weaned down and no further episodes of hypotension.     Review of Systems:   ROS limited by intubation as noted per subjective history    Physical Exam:     All notes, images, and labs from past 24 hours (at minimum) were reviewed.    Vital signs:  Temp: (!) 96.4  F (35.8  C) Temp src: Bladder BP: 116/56 Pulse: 97   Resp: 22 SpO2: 100 %   Oxygen Delivery: 2 LPM Height: 157.5 cm (5' 2\") Weight: 75.6 kg (166 lb 10.7 oz)  I/O: "     Intake/Output Summary (Last 24 hours) at 6/30/2022 1223  Last data filed at 6/30/2022 1200  Gross per 24 hour   Intake 3716.71 ml   Output 2655 ml   Net 1061.71 ml     Constitutional: awake, alert, interactive in no apparent distress.   HEENT: Eyes with pink conjunctivae, anicteric.  Oral mucosa moist without lesions.  PULM: Good air flow bilaterally.  No crackles, no rhonchi, no wheezes. All chest tubes in place  CV: Normal S1 and S2.  Tachycardic, regular.  No murmur, gallop, or rub.  No peripheral edema.   ABD: active bowel sounds, soft, nontender, nondistended.    MSK: Moves all extremities.  No apparent muscle wasting.   NEURO: Alert and oriented, conversant.   SKIN: Warm, dry.  No rash on limited exam.   PSYCH: Mood stable.     Lines, Drains, and Devices:  Peripheral IV 06/28/22 Anterior;Distal;Left Lower forearm (Active)   Site Assessment [facility] 06/30/22 0800   Line Status Saline locked 06/30/22 0800   Phlebitis Scale 0-->no symptoms 06/30/22 0800   Infiltration Scale 0 06/30/22 0800   Number of days: 2       Peripheral IV 06/28/22 Right Upper forearm (Active)   Site Assessment [facility] 06/30/22 0800   Line Status Infusing;Checked every 1-2 hour 06/30/22 0800   Phlebitis Scale 0-->no symptoms 06/30/22 0800   Infiltration Scale 0 06/30/22 0800   Number of days: 2       Arterial Line 06/28/22 Radial (Active)   Site Assessment [facility] 06/30/22 0800   Line Status Positional 06/30/22 0800   Art Line Waveform Appropriate 06/30/22 0800   Art Line Interventions Leveled;Connections checked and tightened 06/30/22 0800   Color/Movement/Sensation Capillary refill less than 3 sec 06/30/22 0800   Line Necessity Yes, meets criteria 06/30/22 0800   Dressing Type Transparent 06/30/22 0800   Dressing Status Clean, dry, intact 06/30/22 0800   Dressing Intervention Dressing changed/new dressing 06/29/22 2000   Dressing Change Due 07/03/22 06/30/22 0400   Number of days: 2       CVC Double Lumen Right Internal jugular (Active)   Site  Assessment WDL 06/30/22 0800   Dressing Type Transparent;Chlorhexidine disk 06/30/22 0800   Dressing Status intact 06/29/22 2000   Dressing Intervention new dressing;dressing changed 06/29/22 1600   Dressing Change Due 07/03/22 06/29/22 2000   Tubing Change secondary tubing;primary tubing 06/29/22 0400   Line Necessity yes, meets criteria 06/30/22 0800   Brown - Status infusing 06/30/22 0800   Brown - Cap Change Due 07/01/22 06/29/22 2000   White - Status infusing 06/30/22 0800   White - Cap Change Due 07/01/22 06/29/22 2000   Phlebitis Scale 0-->no symptoms 06/30/22 0800   Infiltration? no 06/30/22 0800   Infiltration Scale 0 06/30/22 0800   CVC Comment PA catheter in place 06/30/22 0400   Number of days: 2       Pulmonary Artery Catheter - Triple Lumen 06/28/22  Right internal jugular vein (Active)   Dressing dressing dry and intact 06/30/22 0800   Securement catheter securement device utilized 06/30/22 0800   PA Catheter Markings (cm) 53 06/30/22 0800   Phlebitis 0-->no symptoms 06/30/22 0800   Infiltration 0-->no symptoms 06/30/22 0800   Waveform normal 06/30/22 0800   Lumen A - Color YELLOW 06/30/22 0800   Lumen A - Status transduced 06/30/22 0800   Lumen A - Cap Change Due 07/01/22 06/29/22 2000   Lumen B - Color BLUE 06/30/22 0800   Lumen B - Status transduced 06/30/22 0800   Lumen B - Cap Change Due 07/01/22 06/29/22 2000   Lumen C - Color WHITE 06/30/22 0800   Lumen C - Status infusing 06/30/22 0800   Lumen C - Cap Change Due 07/01/22 06/29/22 2000   Number of days: 2     Data:     LABS    CMP:   Recent Labs   Lab 06/30/22  1203 06/30/22  1038 06/30/22  0924 06/30/22  0801 06/30/22  0401 06/30/22  0355 06/29/22  2351 06/29/22  2341 06/29/22  1636 06/29/22  1629 06/29/22  0250 06/29/22  0248 06/29/22  0141 06/28/22  1745 06/28/22  1258   NA  --   --   --   --   --  141  --  140  --   --   --  139 140   < > 139   POTASSIUM  --   --   --   --   --  4.0  --  4.1  --   --   --  4.0 3.6   < > 4.2   CHLORIDE  --    --   --   --   --  107  --  105  --   --   --  100  --   --  93*   CO2  --   --   --   --   --  26  --  26  --   --   --  24  --   --  35*   ANIONGAP  --   --   --   --   --  8  --  9  --   --   --  15  --   --  11   * 139* 140* 140*   < > 115*   < > 190*   < >  --    < > 159* 115*   < > 96   BUN  --   --   --   --   --  19.6  --  20.6  --   --   --  16.5  --   --  14.2   CR  --   --   --   --   --  0.98*  --  1.00*  --   --   --  0.94  --   --  0.85   GFRESTIMATED  --   --   --   --   --  66  --  64  --   --   --  69  --   --  78   ESTUARDO  --   --   --   --   --  7.8*  --  7.9*  --   --   --  8.1*  --   --  11.1*   MAG  --   --   --   --   --  2.9*  --  2.0  --   --   --  2.3  --   --  2.2   PHOS  --   --   --   --   --  3.8  --  3.9  --  2.1*  --  1.6*  --   --  3.3   PROTTOTAL  --   --   --   --   --  4.0*  --  4.1*  --   --   --  3.7*  --   --  7.9   ALBUMIN  --   --   --   --   --  2.6*  --  2.8*  --   --   --  2.3*  --   --  5.3*   BILITOTAL  --   --   --   --   --  0.3  --  0.3  --   --   --  0.9  --   --  0.4   ALKPHOS  --   --   --   --   --  35  --  40  --   --   --  36  --   --  86   AST  --   --   --   --   --  65*  --  78*  --   --   --  143*  --   --  30   ALT  --   --   --   --   --  23  --  27  --   --   --  33  --   --  16    < > = values in this interval not displayed.     CBC:   Recent Labs   Lab 06/30/22  0355 06/29/22  2341 06/29/22  1632 06/29/22  1007 06/29/22  0536 06/29/22  0248   WBC 22.2* 21.5*  --   --  16.2* 12.6*   RBC 3.14* 3.26*  --   --  2.48* 2.95*   HGB 9.8* 10.2* 9.8* 6.8* 7.6* 9.0*   HCT 28.7* 29.5*  --   --  23.1* 27.5*   MCV 91 91  --   --  93 93   MCH 31.2 31.3  --   --  30.6 30.5   MCHC 34.1 34.6  --   --  32.9 32.7   RDW 15.5* 15.5*  --   --  15.1* 14.8   PLT 91* 98*  --   --  103* 124*       INR:   Recent Labs   Lab 06/29/22  0248 06/29/22  0007 06/28/22  1258   INR 1.33* 1.60* 0.91       Glucose:   Recent Labs   Lab 06/30/22  1203 06/30/22  1038 06/30/22  0924  06/30/22  0801 06/30/22  0547 06/30/22  0401   * 139* 140* 140* 105* 110*       Blood Gas:   Recent Labs   Lab 06/30/22  1037 06/30/22  0759 06/30/22  0356 06/30/22  0355 06/29/22  2341   PHV  --  7.46*  --  7.48* 7.44*   PCO2V  --  41  --  37* 36*   PO2V  --  37  --  34 37   HCO3V  --  29*  --  27 25   LINDY  --  4.8*  --  3.6* 0.5   O2PER 40 40 40 40 40       Culture Data No results for input(s): CULT in the last 168 hours.    Virology Data:   Lab Results   Component Value Date    FLUAH1 Not Detected 06/29/2022    FLUAH3 Not Detected 06/29/2022    SN3657 Not Detected 06/29/2022    IFLUB Not Detected 06/29/2022    RSVA Not Detected 06/29/2022    RSVB Not Detected 06/29/2022    PIV1 Not Detected 06/29/2022    PIV2 Not Detected 06/29/2022    PIV3 Not Detected 06/29/2022    HMPV Not Detected 06/29/2022       Historical CMV results (last 3 of prior testing):  No results found for: CMVQNT  No results found for: CMVLOG    Urine Studies    Recent Labs   Lab Test 06/28/22  1309 06/14/21  0939   URINEPH 5.0 5.0   NITRITE Negative Negative   LEUKEST Negative Trace*   WBCU 1 8*       Most Recent [place] Pulmonary Function Testing (FVC/FEV1 only)  FVC-Pre   Date Value Ref Range Status   04/29/2022 1.82 L    11/11/2021 2.17 L    06/14/2021 2.00 L      FVC-%Pred-Pre   Date Value Ref Range Status   04/29/2022 58 %    11/11/2021 70 %    06/14/2021 64 %      FEV1-Pre   Date Value Ref Range Status   04/29/2022 0.51 L    11/11/2021 0.53 L    06/14/2021 0.54 L      FEV1-%Pred-Pre   Date Value Ref Range Status   04/29/2022 20 %    11/11/2021 21 %    06/14/2021 21 %        IMAGING    Recent Results (from the past 48 hour(s))   XR Chest 2 Views    Narrative    Exam: XR CHEST 2 VW, 6/28/2022 2:09 PM    Comparison: CT chest 4/4/2022, chest x-ray 6/14/2021    History: pre-op lung transplant    Findings:  PA and lateral views of the chest.  Trachea is midline. The heart size  is within normal limits. Atherosclerotic ectasia of the aortic  arch.  Hyperinflated lungs with emphysematous changes. No acute airspace  disease. There is no pneumothorax or pleural effusion. The upper  abdomen is unremarkable. No acute osseous abnormality. Flattened  hemidiaphragms on the lateral view.      Impression    Impression:   Hyperinflated lungs with emphysematous changes. No acute airspace  disease.     I have personally reviewed the examination and initial interpretation  and I agree with the findings.    ALVINA HARRY MD         SYSTEM ID:  [id number]   XR Chest Port 1 View    Narrative    XR CHEST PORT 1 VIEW  6/29/2022 3:23 AM      HISTORY: s/p bilateral lung transplant    COMPARISON: 6/28/2022    FINDINGS:   Single AP view of the chest. New postoperative changes of bilateral  lung transplantation. Endotracheal tube tip overlying the mid thoracic  trachea. Right IJ [place]Dex[place] catheter tip overlying the main pulmonary  artery. Biapical and bibasilar chest tubes. Pericardial drain. Enteric  tube courses beyond the field-of-view. Trachea is midline. Cardiac  silhouette is within normal limits. No pneumothorax or significant  pleural effusion. Perihilar and bibasilar streaky opacities.  Subcutaneous emphysema along the lateral chest walls.      Impression    IMPRESSION:   1. Postoperative chest with mild perihilar and bibasilar atelectasis.  2. Endotracheal tube tip approximately 6 cm above the nabil.  3. Right IJ [place] catheter tip in the main pulmonary artery.    I have personally reviewed the examination and initial interpretation  and I agree with the findings.    CECI REGAN DO         SYSTEM ID:  [id number]   POC US Guidance Needle Placement    Impression    Bilateral CLARI catheters   XR Abdomen Port 1 View    Narrative    EXAMINATION:  XR ABDOMEN PORT 1 VIEWS 6/29/2022 12:27 PM     COMPARISON: PET 7/14/2021.    HISTORY: Verify small bowel feeding tube bedside placement.    FINDINGS: Frontal view of the abdomen. Feeding tube tip likely  projects over  distended distal stomach. Bibasilar chest tubes.  [place]-Dexter catheter tip projects over the main pulmonary artery. No  abnormally dilated loops of bowel. Postsurgical changes of bilateral  lung transplantation with clamshell sternotomy wires.       Impression    IMPRESSION: Feeding tube tip overlying the central abdomen likely  projecting over distended distal stomach.    I have personally reviewed the examination and initial interpretation  and I agree with the findings.    NIKOS JAVED MD         SYSTEM ID:  [id number]   XR Chest Port 1 View    Narrative    EXAM: XR CHEST PORT 1 VIEW  6/29/2022 5:22 PM     HISTORY:  lung txp       COMPARISON:  Radiograph earlier same day, 6/20/2022.    TECHNIQUE: AP view of the chest at 60 degrees    FINDINGS:   Devices, lines, tubes: Endotracheal tube, bilateral chest tubes,  pericardial drain, and right internal jugular [place]-Dexter catheter are  in similar positioning. Interval placement of a bulge is a catheters.  Feeding tube tip coursing inferior to the left hemidiaphragm with tip  out of the field of view. Clamshell sternotomy wires are intact.    Postoperative changes of bilateral lung transplantation. The trachea  is midline. The cardiomediastinal silhouette is within normal limits.  Similar perihilar and bibasilar streaky opacities..  No appreciable  pneumothorax, pleural effusion, or focal consolidative opacity. No  acute osseous abnormality.  Decreased amount of percutaneous emphysema  projecting over the lateral chest walls.      Impression    IMPRESSION:   1. Similar perihilar and bibasilar streaky opacities, favoring  postoperative atelectasis and/or edema.  2. No appreciable pneumothoraces with bilateral chest tubes in place.    I have personally reviewed the examination and initial interpretation  and I agree with the findings.    MONSERRAT SEGAL MD         SYSTEM ID:  [id number]   XR Chest Port 1 View    Narrative    XR CHEST PORT 1 VIEW  6/30/2022 1:05 AM       HISTORY: Post-Op Lung    COMPARISON: 6/29/2022    FINDINGS:   Single AP view of the chest. Postoperative changes of bilateral lung  transplantation. Feeding tube courses beyond the field-of-view.  Endotracheal tube tip overlying the mid thoracic trachea. Right IJ  [place]-Dexter catheter tip over the proximal right pulmonary artery.  Bilateral chest tubes and mediastinal drain in similar position.  Stable mediastinum. No pneumothorax or significant right pleural  effusion. Small left pleural effusion. Largely unchanged perihilar and  left basilar streaky opacities.      Impression    IMPRESSION:   1. Stable support devices.  2. Largely unchanged perihilar and left basilar opacities with small  left pleural effusion.     I have personally reviewed the examination and initial interpretation  and I agree with the findings.    YANNICK BENAVIDEZ MD         SYSTEM ID:  [id number]   XR Abdomen Port 1 View    Narrative    XR ABDOMEN PORT 1 VIEWS  6/30/2022 1:10 AM      HISTORY: Verify small bowel feeding tube bedside placement    COMPARISON: 6/29/2022    FINDINGS:   Single supine view of the chest and abdomen. Refer to same day chest  radiograph report for findings of the visualized chest. Feeding tube  tip over the distal distended stomach. Nonobstructive bowel gas  pattern. No definite pneumatosis or portal venous gas.      Impression    IMPRESSION:   Feeding tube tip over the distal distended stomach. Recommend  repositioning if postpyloric is desired.    I have personally reviewed the examination and initial interpretation  and I agree with the findings.    YANNICK BENAVIDEZ MD         SYSTEM ID:  [id number]

## 2022-07-01 ENCOUNTER — APPOINTMENT (OUTPATIENT)
Dept: GENERAL RADIOLOGY | Facility: CLINIC | Age: 62
DRG: 007 | End: 2022-07-01
Attending: STUDENT IN AN ORGANIZED HEALTH CARE EDUCATION/TRAINING PROGRAM
Payer: MEDICARE

## 2022-07-01 ENCOUNTER — APPOINTMENT (OUTPATIENT)
Dept: PHYSICAL THERAPY | Facility: CLINIC | Age: 62
DRG: 007 | End: 2022-07-01
Attending: STUDENT IN AN ORGANIZED HEALTH CARE EDUCATION/TRAINING PROGRAM
Payer: MEDICARE

## 2022-07-01 ENCOUNTER — APPOINTMENT (OUTPATIENT)
Dept: GENERAL RADIOLOGY | Facility: CLINIC | Age: 62
DRG: 007 | End: 2022-07-01
Attending: INTERNAL MEDICINE
Payer: MEDICARE

## 2022-07-01 ENCOUNTER — APPOINTMENT (OUTPATIENT)
Dept: GENERAL RADIOLOGY | Facility: CLINIC | Age: 62
DRG: 007 | End: 2022-07-01
Attending: PHYSICIAN ASSISTANT
Payer: MEDICARE

## 2022-07-01 LAB
ANION GAP SERPL CALCULATED.3IONS-SCNC: 6 MMOL/L (ref 7–15)
BACTERIA SPEC CULT: NO GROWTH
BACTERIA SPEC CULT: NORMAL
BASE EXCESS BLDV CALC-SCNC: 1.3 MMOL/L (ref -7.7–1.9)
BASE EXCESS BLDV CALC-SCNC: 2.8 MMOL/L (ref -7.7–1.9)
BASE EXCESS BLDV CALC-SCNC: 3 MMOL/L (ref -7.7–1.9)
BASOPHILS # BLD AUTO: 0 10E3/UL (ref 0–0.2)
BASOPHILS NFR BLD AUTO: 0 %
BUN SERPL-MCNC: 28.1 MG/DL (ref 8–23)
CALCIUM SERPL-MCNC: 8 MG/DL (ref 8.8–10.2)
CELL TYPE ALLO: NORMAL
CELL TYPE AUTO: NORMAL
CHANNELSHIFTALLOB1: -20
CHANNELSHIFTALLOT1: -40
CHANNELSHIFTAUTOB1: -54
CHANNELSHIFTAUTOT1: -61
CHLORIDE SERPL-SCNC: 107 MMOL/L (ref 98–107)
CREAT SERPL-MCNC: 1.01 MG/DL (ref 0.51–0.95)
CROSSMATCHDATEALLO: NORMAL
CROSSMATCHDATEAUTO: NORMAL
DEPRECATED HCO3 PLAS-SCNC: 29 MMOL/L (ref 22–29)
DONOR ALLO: NORMAL
DONOR AUTO: NORMAL
DONORCELLDATE ALLO: NORMAL
DONORCELLDATE AUTO: NORMAL
EOSINOPHIL # BLD AUTO: 0 10E3/UL (ref 0–0.7)
EOSINOPHIL NFR BLD AUTO: 0 %
ERYTHROCYTE [DISTWIDTH] IN BLOOD BY AUTOMATED COUNT: 15.9 % (ref 10–15)
GFR SERPL CREATININE-BSD FRML MDRD: 63 ML/MIN/1.73M2
GLUCOSE BLDC GLUCOMTR-MCNC: 110 MG/DL (ref 70–99)
GLUCOSE BLDC GLUCOMTR-MCNC: 113 MG/DL (ref 70–99)
GLUCOSE BLDC GLUCOMTR-MCNC: 115 MG/DL (ref 70–99)
GLUCOSE BLDC GLUCOMTR-MCNC: 118 MG/DL (ref 70–99)
GLUCOSE BLDC GLUCOMTR-MCNC: 122 MG/DL (ref 70–99)
GLUCOSE BLDC GLUCOMTR-MCNC: 128 MG/DL (ref 70–99)
GLUCOSE BLDC GLUCOMTR-MCNC: 150 MG/DL (ref 70–99)
GLUCOSE BLDC GLUCOMTR-MCNC: 75 MG/DL (ref 70–99)
GLUCOSE SERPL-MCNC: 92 MG/DL (ref 70–99)
HBV DNA SERPL NAA+PROBE-ACNC: NOT DETECTED IU/ML
HCO3 BLDV-SCNC: 31 MMOL/L (ref 21–28)
HCT VFR BLD AUTO: 32 % (ref 35–47)
HGB BLD-MCNC: 10.1 G/DL (ref 11.7–15.7)
HIV1 RNA # PLAS NAA DL=20: NOT DETECTED COPIES/ML
IMM GRANULOCYTES # BLD: 0.1 10E3/UL
IMM GRANULOCYTES NFR BLD: 1 %
LACTATE SERPL-SCNC: 0.4 MMOL/L (ref 0.7–2)
LACTATE SERPL-SCNC: 0.5 MMOL/L (ref 0.7–2)
LACTATE SERPL-SCNC: 0.5 MMOL/L (ref 0.7–2)
LYMPHOCYTES # BLD AUTO: 0.3 10E3/UL (ref 0.8–5.3)
LYMPHOCYTES NFR BLD AUTO: 3 %
MAGNESIUM SERPL-MCNC: 2.7 MG/DL (ref 1.7–2.3)
MCH RBC QN AUTO: 31.1 PG (ref 26.5–33)
MCHC RBC AUTO-ENTMCNC: 31.6 G/DL (ref 31.5–36.5)
MCV RBC AUTO: 99 FL (ref 78–100)
MONOCYTES # BLD AUTO: 0.6 10E3/UL (ref 0–1.3)
MONOCYTES NFR BLD AUTO: 5 %
NEUTROPHILS # BLD AUTO: 11 10E3/UL (ref 1.6–8.3)
NEUTROPHILS NFR BLD AUTO: 91 %
NRBC # BLD AUTO: 0 10E3/UL
NRBC BLD AUTO-RTO: 0 /100
O2/TOTAL GAS SETTING VFR VENT: 2 %
O2/TOTAL GAS SETTING VFR VENT: 28 %
O2/TOTAL GAS SETTING VFR VENT: 32 %
OXYHGB MFR BLDV: 74 % (ref 70–75)
OXYHGB MFR BLDV: 77 % (ref 70–75)
OXYHGB MFR BLDV: 86 % (ref 70–75)
PATH REPORT.COMMENTS IMP SPEC: NORMAL
PATH REPORT.COMMENTS IMP SPEC: NORMAL
PATH REPORT.FINAL DX SPEC: NORMAL
PATH REPORT.GROSS SPEC: NORMAL
PATH REPORT.MICROSCOPIC SPEC OTHER STN: NORMAL
PATH REPORT.RELEVANT HX SPEC: NORMAL
PCO2 BLDV: 68 MM HG (ref 40–50)
PCO2 BLDV: 70 MM HG (ref 40–50)
PCO2 BLDV: 84 MM HG (ref 40–50)
PH BLDV: 7.18 [PH] (ref 7.32–7.43)
PH BLDV: 7.26 [PH] (ref 7.32–7.43)
PH BLDV: 7.27 [PH] (ref 7.32–7.43)
PHOSPHATE SERPL-MCNC: 4.9 MG/DL (ref 2.5–4.5)
PHOTO IMAGE: NORMAL
PLATELET # BLD AUTO: 73 10E3/UL (ref 150–450)
PO2 BLDV: 42 MM HG (ref 25–47)
PO2 BLDV: 47 MM HG (ref 25–47)
PO2 BLDV: 59 MM HG (ref 25–47)
POS CUT OFF ALLO B: >93
POS CUT OFF ALLO T: >79
POS CUT OFF AUTO B: >93
POS CUT OFF AUTO T: >79
POTASSIUM SERPL-SCNC: 4.5 MMOL/L (ref 3.4–5.3)
RBC # BLD AUTO: 3.25 10E6/UL (ref 3.8–5.2)
RESULT ALLO B1: NORMAL
RESULT ALLO T1: NORMAL
RESULT AUTO B1: NORMAL
RESULT AUTO T1: NORMAL
SERUM DATE ALLO B1: NORMAL
SERUM DATE ALLO T1: NORMAL
SERUM DATE AUTO B1: NORMAL
SERUM DATE AUTO T1: NORMAL
SODIUM SERPL-SCNC: 142 MMOL/L (ref 136–145)
TACROLIMUS BLD-MCNC: 6.9 UG/L (ref 5–15)
TESTMETHODALLO: NORMAL
TESTMETHODAUTO: NORMAL
TME LAST DOSE: NORMAL H
TME LAST DOSE: NORMAL H
TREATMENT ALLO B1: NORMAL
TREATMENT ALLO T1: NORMAL
TREATMENT AUTO B1: NORMAL
TREATMENT AUTO T1: NORMAL
UPPER GI ENDOSCOPY: NORMAL
WBC # BLD AUTO: 12 10E3/UL (ref 4–11)
ZZZCOMMENT ALLOB1: NORMAL

## 2022-07-01 PROCEDURE — 71045 X-RAY EXAM CHEST 1 VIEW: CPT | Mod: 26 | Performed by: STUDENT IN AN ORGANIZED HEALTH CARE EDUCATION/TRAINING PROGRAM

## 2022-07-01 PROCEDURE — 250N000011 HC RX IP 250 OP 636: Performed by: STUDENT IN AN ORGANIZED HEALTH CARE EDUCATION/TRAINING PROGRAM

## 2022-07-01 PROCEDURE — 84100 ASSAY OF PHOSPHORUS: CPT | Performed by: STUDENT IN AN ORGANIZED HEALTH CARE EDUCATION/TRAINING PROGRAM

## 2022-07-01 PROCEDURE — 94660 CPAP INITIATION&MGMT: CPT

## 2022-07-01 PROCEDURE — 99233 SBSQ HOSP IP/OBS HIGH 50: CPT | Mod: 24 | Performed by: INTERNAL MEDICINE

## 2022-07-01 PROCEDURE — 99232 SBSQ HOSP IP/OBS MODERATE 35: CPT | Mod: 24 | Performed by: ANESTHESIOLOGY

## 2022-07-01 PROCEDURE — 250N000011 HC RX IP 250 OP 636: Performed by: PHYSICIAN ASSISTANT

## 2022-07-01 PROCEDURE — 71045 X-RAY EXAM CHEST 1 VIEW: CPT

## 2022-07-01 PROCEDURE — 83605 ASSAY OF LACTIC ACID: CPT

## 2022-07-01 PROCEDURE — 250N000009 HC RX 250: Performed by: STUDENT IN AN ORGANIZED HEALTH CARE EDUCATION/TRAINING PROGRAM

## 2022-07-01 PROCEDURE — 999N000015 HC STATISTIC ARTERIAL MONITORING DAILY

## 2022-07-01 PROCEDURE — 76000 FLUOROSCOPY <1 HR PHYS/QHP: CPT | Mod: TC

## 2022-07-01 PROCEDURE — 999N000157 HC STATISTIC RCP TIME EA 10 MIN

## 2022-07-01 PROCEDURE — 250N000012 HC RX MED GY IP 250 OP 636 PS 637: Performed by: STUDENT IN AN ORGANIZED HEALTH CARE EDUCATION/TRAINING PROGRAM

## 2022-07-01 PROCEDURE — 258N000003 HC RX IP 258 OP 636: Performed by: STUDENT IN AN ORGANIZED HEALTH CARE EDUCATION/TRAINING PROGRAM

## 2022-07-01 PROCEDURE — 94640 AIRWAY INHALATION TREATMENT: CPT | Mod: 76

## 2022-07-01 PROCEDURE — 250N000013 HC RX MED GY IP 250 OP 250 PS 637: Performed by: INTERNAL MEDICINE

## 2022-07-01 PROCEDURE — 80048 BASIC METABOLIC PNL TOTAL CA: CPT | Performed by: STUDENT IN AN ORGANIZED HEALTH CARE EDUCATION/TRAINING PROGRAM

## 2022-07-01 PROCEDURE — 94668 MNPJ CHEST WALL SBSQ: CPT

## 2022-07-01 PROCEDURE — 250N000012 HC RX MED GY IP 250 OP 636 PS 637: Performed by: INTERNAL MEDICINE

## 2022-07-01 PROCEDURE — 43241 EGD TUBE/CATH INSERTION: CPT | Performed by: INTERNAL MEDICINE

## 2022-07-01 PROCEDURE — 44500 INTRO GASTROINTESTINAL TUBE: CPT

## 2022-07-01 PROCEDURE — 97161 PT EVAL LOW COMPLEX 20 MIN: CPT | Mod: GP

## 2022-07-01 PROCEDURE — G0500 MOD SEDAT ENDO SERVICE >5YRS: HCPCS | Performed by: INTERNAL MEDICINE

## 2022-07-01 PROCEDURE — 88309 TISSUE EXAM BY PATHOLOGIST: CPT | Mod: 26 | Performed by: PATHOLOGY

## 2022-07-01 PROCEDURE — 0DHA8UZ INSERTION OF FEEDING DEVICE INTO JEJUNUM, VIA NATURAL OR ARTIFICIAL OPENING ENDOSCOPIC: ICD-10-PCS | Performed by: INTERNAL MEDICINE

## 2022-07-01 PROCEDURE — 250N000011 HC RX IP 250 OP 636: Performed by: ANESTHESIOLOGY

## 2022-07-01 PROCEDURE — 82805 BLOOD GASES W/O2 SATURATION: CPT | Performed by: THORACIC SURGERY (CARDIOTHORACIC VASCULAR SURGERY)

## 2022-07-01 PROCEDURE — 250N000013 HC RX MED GY IP 250 OP 250 PS 637: Performed by: PHYSICIAN ASSISTANT

## 2022-07-01 PROCEDURE — 250N000012 HC RX MED GY IP 250 OP 636 PS 637: Performed by: PHYSICIAN ASSISTANT

## 2022-07-01 PROCEDURE — 5A09457 ASSISTANCE WITH RESPIRATORY VENTILATION, 24-96 CONSECUTIVE HOURS, CONTINUOUS POSITIVE AIRWAY PRESSURE: ICD-10-PCS | Performed by: ANESTHESIOLOGY

## 2022-07-01 PROCEDURE — 250N000011 HC RX IP 250 OP 636

## 2022-07-01 PROCEDURE — 85049 AUTOMATED PLATELET COUNT: CPT | Performed by: STUDENT IN AN ORGANIZED HEALTH CARE EDUCATION/TRAINING PROGRAM

## 2022-07-01 PROCEDURE — 250N000013 HC RX MED GY IP 250 OP 250 PS 637: Performed by: STUDENT IN AN ORGANIZED HEALTH CARE EDUCATION/TRAINING PROGRAM

## 2022-07-01 PROCEDURE — 76000 FLUOROSCOPY <1 HR PHYS/QHP: CPT | Mod: 26 | Performed by: RADIOLOGY

## 2022-07-01 PROCEDURE — 94640 AIRWAY INHALATION TREATMENT: CPT

## 2022-07-01 PROCEDURE — 271N000002 HC RX 271: Performed by: ANESTHESIOLOGY

## 2022-07-01 PROCEDURE — 94667 MNPJ CHEST WALL 1ST: CPT

## 2022-07-01 PROCEDURE — 999N000155 HC STATISTIC RAPCV CVP MONITORING

## 2022-07-01 PROCEDURE — 258N000001 HC RX 258: Performed by: STUDENT IN AN ORGANIZED HEALTH CARE EDUCATION/TRAINING PROGRAM

## 2022-07-01 PROCEDURE — 83735 ASSAY OF MAGNESIUM: CPT | Performed by: STUDENT IN AN ORGANIZED HEALTH CARE EDUCATION/TRAINING PROGRAM

## 2022-07-01 PROCEDURE — 999N000045 HC STATISTIC DAILY SWAN MONITORING

## 2022-07-01 PROCEDURE — 99153 MOD SED SAME PHYS/QHP EA: CPT | Performed by: INTERNAL MEDICINE

## 2022-07-01 PROCEDURE — 200N000002 HC R&B ICU UMMC

## 2022-07-01 PROCEDURE — 80197 ASSAY OF TACROLIMUS: CPT | Performed by: INTERNAL MEDICINE

## 2022-07-01 PROCEDURE — 250N000009 HC RX 250: Performed by: THORACIC SURGERY (CARDIOTHORACIC VASCULAR SURGERY)

## 2022-07-01 PROCEDURE — C9113 INJ PANTOPRAZOLE SODIUM, VIA: HCPCS | Performed by: STUDENT IN AN ORGANIZED HEALTH CARE EDUCATION/TRAINING PROGRAM

## 2022-07-01 PROCEDURE — 97530 THERAPEUTIC ACTIVITIES: CPT | Mod: GP

## 2022-07-01 RX ORDER — FENTANYL CITRATE 50 UG/ML
INJECTION, SOLUTION INTRAMUSCULAR; INTRAVENOUS
Status: COMPLETED
Start: 2022-07-01 | End: 2022-07-01

## 2022-07-01 RX ORDER — SULFAMETHOXAZOLE AND TRIMETHOPRIM 200; 40 MG/5ML; MG/5ML
10 SUSPENSION ORAL DAILY
Status: DISCONTINUED | OUTPATIENT
Start: 2022-07-11 | End: 2022-07-02

## 2022-07-01 RX ORDER — FUROSEMIDE 10 MG/ML
20 INJECTION INTRAMUSCULAR; INTRAVENOUS ONCE
Status: COMPLETED | OUTPATIENT
Start: 2022-07-01 | End: 2022-07-01

## 2022-07-01 RX ORDER — SULFAMETHOXAZOLE/TRIMETHOPRIM 800-160 MG
2 TABLET ORAL 2 TIMES DAILY
Status: DISCONTINUED | OUTPATIENT
Start: 2022-07-01 | End: 2022-07-02

## 2022-07-01 RX ORDER — FENTANYL CITRATE 50 UG/ML
75 INJECTION, SOLUTION INTRAMUSCULAR; INTRAVENOUS
Status: COMPLETED | OUTPATIENT
Start: 2022-07-01 | End: 2022-07-01

## 2022-07-01 RX ORDER — AMOXICILLIN 250 MG
2 CAPSULE ORAL 2 TIMES DAILY
Status: DISCONTINUED | OUTPATIENT
Start: 2022-07-01 | End: 2022-07-13

## 2022-07-01 RX ORDER — LIDOCAINE HYDROCHLORIDE 20 MG/ML
5 SOLUTION OROPHARYNGEAL ONCE
Status: COMPLETED | OUTPATIENT
Start: 2022-07-01 | End: 2022-07-01

## 2022-07-01 RX ORDER — SULFAMETHOXAZOLE AND TRIMETHOPRIM 400; 80 MG/1; MG/1
1 TABLET ORAL DAILY
Status: DISCONTINUED | OUTPATIENT
Start: 2022-07-11 | End: 2022-07-02

## 2022-07-01 RX ORDER — ACETAMINOPHEN 325 MG/1
975 TABLET ORAL EVERY 8 HOURS
Status: DISCONTINUED | OUTPATIENT
Start: 2022-07-01 | End: 2022-07-14

## 2022-07-01 RX ORDER — POLYETHYLENE GLYCOL 3350 17 G/17G
17 POWDER, FOR SOLUTION ORAL 2 TIMES DAILY
Status: DISCONTINUED | OUTPATIENT
Start: 2022-07-01 | End: 2022-07-12

## 2022-07-01 RX ADMIN — MYCOPHENOLATE MOFETIL 1500 MG: 250 CAPSULE ORAL at 08:28

## 2022-07-01 RX ADMIN — ACETYLCYSTEINE 2 ML: 200 SOLUTION ORAL; RESPIRATORY (INHALATION) at 11:23

## 2022-07-01 RX ADMIN — NYSTATIN 1000000 UNITS: 100000 SUSPENSION ORAL at 19:28

## 2022-07-01 RX ADMIN — OXYCODONE HYDROCHLORIDE 10 MG: 10 TABLET ORAL at 16:30

## 2022-07-01 RX ADMIN — LIDOCAINE HYDROCHLORIDE 15 ML: 20 SOLUTION ORAL; TOPICAL at 10:33

## 2022-07-01 RX ADMIN — CEFTAZIDIME 2 G: 2 INJECTION, POWDER, FOR SOLUTION INTRAVENOUS at 01:19

## 2022-07-01 RX ADMIN — TACROLIMUS 2 MG: 1 CAPSULE ORAL at 08:30

## 2022-07-01 RX ADMIN — NYSTATIN 1000000 UNITS: 100000 SUSPENSION ORAL at 16:41

## 2022-07-01 RX ADMIN — GABAPENTIN 100 MG: 100 CAPSULE ORAL at 08:29

## 2022-07-01 RX ADMIN — MIDAZOLAM HYDROCHLORIDE 3 MG: 1 INJECTION, SOLUTION INTRAMUSCULAR; INTRAVENOUS at 16:20

## 2022-07-01 RX ADMIN — PANTOPRAZOLE SODIUM 40 MG: 40 INJECTION, POWDER, FOR SOLUTION INTRAVENOUS at 08:29

## 2022-07-01 RX ADMIN — ACETYLCYSTEINE 2 ML: 200 SOLUTION ORAL; RESPIRATORY (INHALATION) at 16:59

## 2022-07-01 RX ADMIN — LEVALBUTEROL HYDROCHLORIDE 1.25 MG: 1.25 SOLUTION RESPIRATORY (INHALATION) at 11:23

## 2022-07-01 RX ADMIN — Medication 1 PACKET: at 08:30

## 2022-07-01 RX ADMIN — SODIUM CHLORIDE 500 ML: 9 INJECTION, SOLUTION INTRAVENOUS at 23:10

## 2022-07-01 RX ADMIN — OXYCODONE HYDROCHLORIDE 5 MG: 5 TABLET ORAL at 00:07

## 2022-07-01 RX ADMIN — LEVALBUTEROL HYDROCHLORIDE 1.25 MG: 1.25 SOLUTION RESPIRATORY (INHALATION) at 16:59

## 2022-07-01 RX ADMIN — CEFTAZIDIME 2 G: 2 INJECTION, POWDER, FOR SOLUTION INTRAVENOUS at 08:30

## 2022-07-01 RX ADMIN — FENTANYL CITRATE 75 MCG: 50 INJECTION, SOLUTION INTRAMUSCULAR; INTRAVENOUS at 16:19

## 2022-07-01 RX ADMIN — ACETYLCYSTEINE 2 ML: 200 SOLUTION ORAL; RESPIRATORY (INHALATION) at 20:18

## 2022-07-01 RX ADMIN — HEPARIN SODIUM 5000 UNITS: 5000 INJECTION, SOLUTION INTRAVENOUS; SUBCUTANEOUS at 13:02

## 2022-07-01 RX ADMIN — FUROSEMIDE 20 MG: 10 INJECTION, SOLUTION INTRAMUSCULAR; INTRAVENOUS at 09:26

## 2022-07-01 RX ADMIN — ACETAMINOPHEN 975 MG: 325 TABLET, FILM COATED ORAL at 13:02

## 2022-07-01 RX ADMIN — ACETAMINOPHEN 975 MG: 325 TABLET, FILM COATED ORAL at 22:10

## 2022-07-01 RX ADMIN — POLYETHYLENE GLYCOL 3350 17 G: 17 POWDER, FOR SOLUTION ORAL at 08:29

## 2022-07-01 RX ADMIN — MULTIVITAMIN 15 ML: LIQUID ORAL at 08:29

## 2022-07-01 RX ADMIN — PREDNISOLONE 17.5 MG: 15 SOLUTION ORAL at 08:30

## 2022-07-01 RX ADMIN — MYCOPHENOLATE MOFETIL 1500 MG: 250 CAPSULE ORAL at 19:29

## 2022-07-01 RX ADMIN — NYSTATIN 1000000 UNITS: 100000 SUSPENSION ORAL at 08:29

## 2022-07-01 RX ADMIN — Medication 500 MG: at 00:51

## 2022-07-01 RX ADMIN — SENNOSIDES AND DOCUSATE SODIUM 2 TABLET: 8.6; 5 TABLET ORAL at 19:29

## 2022-07-01 RX ADMIN — LEVALBUTEROL HYDROCHLORIDE 1.25 MG: 1.25 SOLUTION RESPIRATORY (INHALATION) at 20:18

## 2022-07-01 RX ADMIN — NYSTATIN 1000000 UNITS: 100000 SUSPENSION ORAL at 13:02

## 2022-07-01 RX ADMIN — OXYCODONE HYDROCHLORIDE 10 MG: 10 TABLET ORAL at 11:31

## 2022-07-01 RX ADMIN — DEXTROSE MONOHYDRATE 25 ML: 25 INJECTION, SOLUTION INTRAVENOUS at 18:14

## 2022-07-01 RX ADMIN — ACETAMINOPHEN 975 MG: 325 TABLET, FILM COATED ORAL at 06:21

## 2022-07-01 RX ADMIN — CEFTAZIDIME 2 G: 2 INJECTION, POWDER, FOR SOLUTION INTRAVENOUS at 16:45

## 2022-07-01 RX ADMIN — SULFAMETHOXAZOLE AND TRIMETHOPRIM 2 TABLET: 800; 160 TABLET ORAL at 09:26

## 2022-07-01 RX ADMIN — GABAPENTIN 100 MG: 100 CAPSULE ORAL at 13:02

## 2022-07-01 RX ADMIN — HEPARIN SODIUM 5000 UNITS: 5000 INJECTION, SOLUTION INTRAVENOUS; SUBCUTANEOUS at 06:21

## 2022-07-01 RX ADMIN — GABAPENTIN 100 MG: 100 CAPSULE ORAL at 19:29

## 2022-07-01 RX ADMIN — OXYCODONE HYDROCHLORIDE 5 MG: 5 TABLET ORAL at 22:10

## 2022-07-01 RX ADMIN — SULFAMETHOXAZOLE AND TRIMETHOPRIM 2 TABLET: 800; 160 TABLET ORAL at 19:29

## 2022-07-01 RX ADMIN — POLYETHYLENE GLYCOL 3350 17 G: 17 POWDER, FOR SOLUTION ORAL at 19:29

## 2022-07-01 RX ADMIN — SENNOSIDES AND DOCUSATE SODIUM 1 TABLET: 8.6; 5 TABLET ORAL at 08:48

## 2022-07-01 RX ADMIN — OXYCODONE HYDROCHLORIDE 5 MG: 5 TABLET ORAL at 04:26

## 2022-07-01 RX ADMIN — HEPARIN SODIUM 5000 UNITS: 5000 INJECTION, SOLUTION INTRAVENOUS; SUBCUTANEOUS at 22:09

## 2022-07-01 RX ADMIN — PREDNISOLONE 17.5 MG: 15 SOLUTION ORAL at 19:32

## 2022-07-01 RX ADMIN — TACROLIMUS 2 MG: 1 CAPSULE ORAL at 18:12

## 2022-07-01 ASSESSMENT — ACTIVITIES OF DAILY LIVING (ADL)
ADLS_ACUITY_SCORE: 31
ADLS_ACUITY_SCORE: 30
ADLS_ACUITY_SCORE: 31
ADLS_ACUITY_SCORE: 30
ADLS_ACUITY_SCORE: 31
ADLS_ACUITY_SCORE: 30
ADLS_ACUITY_SCORE: 31
ADLS_ACUITY_SCORE: 31

## 2022-07-01 NOTE — PROGRESS NOTES
Donor Culture Results:    Final positive donor sputum culture results have been uploaded into [facility]. Donor ID [id number].  Dr. Franks notified of results.

## 2022-07-01 NOTE — PROGRESS NOTES
Transplant Social Work Services Progress Note      Date of Initial Social Work Evaluation: 4/8/2021  Collaborated with: n/a    Data: supportive visit with patient.  She had just had NG put in and found it to be very uncomfortable.  Otherwise, states she is feeling ok.  Pleased with care.  Happy to have received the transplant.    Intervention: support and encouragement  Assessment: doing OK post transplant  Education provided by SW: what to expect for the next couple days.    Plan:    Discharge Plans in Progress: none at this time    Barriers to d/c plan: critical illness    Follow up Plan: will continue to follow for support, counseling, education and resources.      Mana Valdivia [facility]  Lung Transplant   Phone: [phone] Pager: [phone]

## 2022-07-01 NOTE — PROGRESS NOTES
07/01/22 1200   Quick Adds   Type of Visit Initial PT Evaluation   Living Environment   People in Home child(ray), adult   Current Living Arrangements house   Home Accessibility stairs to enter home;stairs within home   Number of Stairs, Main Entrance 1   Stair Railings, Main Entrance none   Number of Stairs, Within Home, Primary nine  (split entrance 5 down, ~9 up)   Stair Railings, Within Home, Primary railings safe and in good condition   Transportation Anticipated family or friend will provide   Living Environment Comments Pt has lived with her adult son for the last 2 years, her home has mold in it which has not been appropraite for pt's lungs, per her report. Pt's son assists with household management/cleaning and makes some of the meals. Pt was otherwise I with ADLs and mobility taking extra time due to SOB. Pt was on4-5L O2 with activity prior, slept on 2 L.   Self-Care   Usual Activity Tolerance moderate   Current Activity Tolerance poor   Equipment Currently Used at Home shower chair  (Has w/c was not using currently)   General Information   Onset of Illness/Injury or Date of Surgery 06/29/22   Referring Physician Sue Sunshine MD   Patient/Family Therapy Goals Statement (PT) get stronger again   Pertinent History of Current Problem (include personal factors and/or comorbidities that impact the POC) 62 year old female with PMH of oxygen-dependent COPD, HFpEF, HTN, HCV, and osteopenia who underwent bilateral lung transplant on 6/28/22 with Dr. Sunshine.   Existing Precautions/Restrictions sternal  (Clamshell)   General Observations Pt supine, agreeable to PT   Cognition   Affect/Mental Status (Cognition) WFL;emotionally labile   Orientation Status (Cognition) oriented to;person;place;situation   Follows Commands (Cognition) follows one-step commands;75-90% accuracy;increased processing time needed;repetition of directions required;verbal cues/prompting required   Posture    Posture Forward head  position;Protracted shoulders   Range of Motion (ROM)   ROM Comment B LEs decreased due to thiracic procedure, within expectations   Strength (Manual Muscle Testing)   Strength Comments B LEs decreased due to thiracic procedure, within expectations   Bed Mobility   Comment, (Bed Mobility) Max A sup>sitting   Transfers   Comment, (Transfers) Heavy mod A sit<>stand   Gait/Stairs (Locomotion)   Comment, (Gait/Stairs) Heavy mod A small shuffling steps to chair   Clinical Impression   Criteria for Skilled Therapeutic Intervention Yes, treatment indicated   PT Diagnosis (PT) impaired functional mobility   Influenced by the following impairments decreased strength, ROM, act tolerance, balance   Functional limitations due to impairments decreased I with transfers, gait, bed mob, barrier navigation, ADL   Clinical Presentation (PT Evaluation Complexity) Evolving/Changing   Clinical Presentation Rationale clinical judgement   Clinical Decision Making (Complexity) moderate complexity   Planned Therapy Interventions (PT) balance training;bed mobility training;gait training;home exercise program;patient/family education;stair training;ROM (range of motion);strengthening;transfer training;progressive activity/exercise;risk factor education;home program guidelines   Anticipated Equipment Needs at Discharge (PT) walker, rolling   Risk & Benefits of therapy have been explained evaluation/treatment results reviewed;care plan/treatment goals reviewed;risks/benefits reviewed;current/potential barriers reviewed;participants voiced agreement with care plan;participants included;patient;son   PT Discharge Planning   PT Discharge Recommendation (DC Rec) Acute Rehab Center-Motivated patient will benefit from intensive, interdisciplinary therapy.  Anticipate will be able to tolerate 3 hours of therapy per day;Transitional Care Facility   PT Rationale for DC Rec Pt currently significantly belowe baseline mobility but making good progress,  anticipate will need rehab stay to maximize functional I to facilitate safe discharge plan for home. Pt may be a good candidate for ARU, will continue to watch progress and monitor.   PT Brief overview of current status mod/max A transfers and step turn to recliner   Plan of Care Review   Plan of Care Reviewed With patient   Total Evaluation Time   Total Evaluation Time (Minutes) 9   Physical Therapy Goals   PT Frequency 6x/week   PT Predicted Duration/Target Date for Goal Attainment 08/04/22   PT Goals Bed Mobility;Transfers;Gait;Stairs   PT: Bed Mobility Modified independent;Supine to/from sit;Rolling;Within precautions   PT: Transfers Modified independent;Sit to/from stand;Bed to/from chair;Assistive device;Within precautions   PT: Gait Modified independent;Rolling walker;Within precautions;Greater than 200 feet   PT: Stairs Assistive device;9 stairs;Rail on right

## 2022-07-01 NOTE — PLAN OF CARE
Major Shift Events:  Pt vitally stable, temp 99's. Neuros intact. 2L NC while sleeping, lungs slight crackles, nonproductive cough. Low uop, no BM. About 50mL/hr from each chest tube, nothing from VAC. CVP 9, PA 34/14, SvO2 61, CO 4.4, CI 2.4, SVR 1188, .    Plan: Monitor for pain, swallow study in morning.    For vital signs and complete assessments, please see documentation flowsheets.

## 2022-07-01 NOTE — PLAN OF CARE
SLP: Cancel - Pulm transplant MD ok'd pt for swallow eval with SLP and FEES ordered. Attempted to see pt x2 for bedside swallow eval + FEES. Pt NPO for feeding tube placement under fluoro upon first attempt, pt then sedated upon second attempt. SLP will reschedule bedside swallow eval for 7/2, will schedule FEES as able/appropriate.

## 2022-07-01 NOTE — OR NURSING
Writer set up travel cart and equipment for  placement of a 12F [place] NJ tube. Patient signed consent and staff RN provided sedation and monitoring of patient during and after exam. Use of floroscopy to confirm placement was utilized.        Patient tolerated exam with no concerns noted.

## 2022-07-01 NOTE — PLAN OF CARE
"Major Shift Events:  Afebrile overnight. MAP >65 and remains off pressors since yesterday. Sinus rhythm. CT x5 with moderate sero-sang output 500 out overnight.   UOP avg 45/hr.  Sats stable at rest on RA, but dyspneic with minimal exertion with repositioning. Placed on 2L NC- with activity sats drop to upper 80s/low 90s but recover quickly.   Pain well managed, given 5mg oxycodone x2. Anxious overnight, states she \"feels too good than what I was expecting to feel\" when talking about her pain.  On insulin gtt 0.5mg overnight.    CHRIS: CVP 9; PAP 28/12(17); CO/CI 7.2/4; ; SvO2 77.  VBG this AM pH 7.26; CO2 70; PO2 47; Bicarb 31  MD updated- no response/ no new orders.  Left radial art line no longer has blood return. RIJ khalida, PIV x2    Plan: Swallow screen, up in chair with PT/OT, aggressive pulm toilet    For vital signs and complete assessments, please see documentation flowsheets.       Problem: Plan of Care - These are the overarching goals to be used throughout the patient stay.    Goal: Plan of Care Review/Shift Note  Description: The Plan of Care Review/Shift note should be completed every shift.  The Outcome Evaluation is a brief statement about your assessment that the patient is improving, declining, or no change.  This information will be displayed automatically on your shift note.  Outcome: Ongoing, Progressing  Flowsheets (Taken 7/1/2022 0539)  Plan of Care Reviewed With: patient  Outcome Evaluation: Extubated- 2L NC overnight. CO2 peak @ 70. Pain well managed  Overall Patient Progress: improving     Problem: Respiratory Compromise (Lung Surgery)  Goal: Effective Oxygenation and Ventilation  Outcome: Ongoing, Progressing     Problem: Hemodynamic Instability (Lung Surgery)  Goal: Effective Tissue Perfusion  Outcome: Ongoing, Progressing       "

## 2022-07-01 NOTE — PROGRESS NOTES
CV ICU PROGRESS NOTE  July 1, 2022      CO-MORBIDITIES:   HTN, HFpEF, COPD, HCV    ASSESSMENT: Sofie Rodriguez is a 62 year old female with PMH of oxygen-dependent COPD, HFpEF, HTN, HCV, and osteopenia who underwent bilateral lung transplant on 6/28/22 with Dr. Sunshine.    TODAY'S PROGRESS:   - Extubated yesterday afternoon, on 2L NC  - High pCO2 to 70, patient feels well. Will repeat another VBG  - Off pressors and precedex  - Began bactrim for explant growing Stenotrophomonas and donor growing Strep  - Attempting to advance feeding tube past pylorus  - Consider transition to sliding scale insulin once enteral feeds achieved  - Lasix 20 mg x1 dose  - Removed montgomery, L arterial line, PA catheter  - L hand numbness    PLAN:  Neuro/ pain/ sedation:  Acute postoperative pain  - Monitor neurological status. Notify the MD for any acute changes in exam.  - Fentanyl and precedex discontinued  - Scheduled: acetaminophen, gabapentin.   PRN: oxycodone, dilaudid  - Erector spinae catheters placed 6/29     Pulmonary:  #Postoperative ventilatory support  #Bilateral Lung Transplant  #Hx COPD  - Titrate FiO2 for SpO2 >92%  - Intubated, ventilated --> Plan to extubate 6/30  - Pulmonary hygiene: Incentive spirometer every 15- 30 minutes when awake, flutter valve, C&DB, airway clearance 4x/day, chest percussion  - Basiliximab POD #0 and #4, methylprednisolone, mycophenolate, tacrolimus  - Valganciclovir  - Small lungs to chest - 30-40 degree head up  - Lungs appear wet on CXR, gave lasix 20 mg x1 dose  - Chest tubes with serosanguinous output     Cardiovascular:  #Hx HTN  #Hx HFpEF  - Monitor hemodynamic status.   - Goal MAP >65  - Off pressors     GI care/ Nutrition:   - NPO  - NJ not advanced past pylorus, plan to re-attempt  - Formal swallow study prior to initiating tube feeds  - PPI: protonix  - Continue bowel regimen: miralax; PRN moM, bisacodyl supp    Renal/ Fluid Balance/ Electrolytes:   BL creat appears to be ~ 0.85  - Strict  "I/O, daily weights  - Avoid/limit nephrotoxins as able  - Replete lytes PRN per protocol     Endocrine:    Stress induced hyperglycemia  - Insulin gtt --> transition to sliding scale once tube feeds initiated  - Goal BG <180 for optimal healing     ID/ Antibiotics:  Stress-induced leukocytosis  - Continue to monitor fever curve, WBC and inflammatory markers as appropriate  - Prophylaxis: POD #8-90 nystatin, TMP/SMX, valganciclovir  - Post-op abx: Vancomycin discontinue , ceftazadime discontinue   - Post-op cultures:   - Gram stain (1+ G+ cocci)   - Resp aerobic cx (native lun+ Stenotrophomonas maltophilia, donor lung: beta hemolytic Strep)   - Fungal cx (NGTD)   - AFB cx (NGTD)  - TMP/SMX began  for positive cultures  - Blood cx sent  (NGTD)     Heme:     Acute blood loss anemia  Acute blood loss thrombocytopenia  - S/p pRBCs x2      MSK/ Skin:  Sternotomy  Surgical Incision  - Sternal precautions  - Postoperative incision management per protocol  - PT/OT/CR     Prophylaxis:    - Mechanical prophylaxis for DVT  - Chemical DVT prophylaxis - subQ heparin 5000u  - PPI      Lines/ tubes/ drains:  - Arterial Line (out ), ETT, CTs x5, PA catheter (out ), RIJ, montgomery (out ), NG, PIV x2  - 4 Pleural, 1 med     Disposition:  - CVICU    Patient seen, findings and plan discussed with CV ICU staff, Dr. Leon.    -----------------------------------  Cherie Clifton, MS4  [facility]    Resident/Fellow Attestation   I was present with the student who participated in the service and in the documentation of the   note. I have verified the history and personally performed the physical exam and medical decisionmaking. I agree with the assessment and plan of care as documented in the note.\" Signed & Dated: Dr. Andres Davis MD            ====================================    SUBJECTIVE:   Continuing to do well post-op, patient says that she feels great. Minimal pain. L hand numbness began overnight and " extends from all 5 fingertips to mid-hand. Able to perform all ROM, pulses normal, no swelling. Nurse placed a heating pack.     OBJECTIVE:   1. VITAL SIGNS:   Temp:  [93  F (33.9  C)-99.7  F (37.6  C)] 98.4  F (36.9  C)  Pulse:  [] 80  Resp:  [17-22] 18  MAP:  [56 mmHg-200 mmHg] 61 mmHg  Arterial Line BP: ()/() 103/42  FiO2 (%):  [30 %-40 %] 30 %  SpO2:  [85 %-100 %] 100 %  Vent Mode: CMV/AC  (Continuous Mandatory Ventilation/ Assist Control)  FiO2 (%): 30 %  Resp Rate (Set): 14 breaths/min  Tidal Volume (Set, mL): 390 mL  PEEP (cm H2O): 8 cmH2O  Resp: 18      2. INTAKE/ OUTPUT:   I/O last 3 completed shifts:  In: 1474.21 [I.V.:1059.21; NG/GT:415]  Out: 2163 [Urine:598; Chest Tube:1565]    3. PHYSICAL EXAMINATION:   General: Awake, alert, lying in bed with NC in place  Pulm: Normal respiratory effort and rate  CV: Normal radial pulses bilaterally  Neuro: Moving extremities, oriented x3    4. INVESTIGATIONS:   Arterial Blood Gases   Recent Labs   Lab 06/30/22  1037 06/30/22  0356 06/29/22  2339 06/29/22  1645   PH 7.37 7.54* 7.48* 7.46*   PCO2 51* 34* 40 41   PO2 133* 184* 162* 196*   HCO3 29* 29* 29* 29*     Complete Blood Count   Recent Labs   Lab 07/01/22  0409 06/30/22  0355 06/29/22  2341 06/29/22  1632 06/29/22  1007 06/29/22  0536   WBC 12.0* 22.2* 21.5*  --   --  16.2*   HGB 10.1* 9.8* 10.2* 9.8*   < > 7.6*   PLT 73* 91* 98*  --   --  103*    < > = values in this interval not displayed.     Basic Metabolic Panel  Recent Labs   Lab 07/01/22  0821 07/01/22  0409 07/01/22  0223 07/01/22  0009 06/30/22  0401 06/30/22  0355 06/29/22  2351 06/29/22  2341 06/29/22  0250 06/29/22  0248   NA  --  142  --   --   --  141  --  140  --  139   POTASSIUM  --  4.5  --   --   --  4.0  --  4.1  --  4.0   CHLORIDE  --  107  --   --   --  107  --  105  --  100   CO2  --  29  --   --   --  26  --  26  --  24   BUN  --  28.1*  --   --   --  19.6  --  20.6  --  16.5   CR  --  1.01*  --   --   --  0.98*  --  1.00*   --  0.94   * 92 150* 122*   < > 115*   < > 190*   < > 159*    < > = values in this interval not displayed.     Liver Function Tests  Recent Labs   Lab 06/30/22  0355 06/29/22  2341 06/29/22  0248 06/29/22  0007 06/28/22  1258   AST 65* 78* 143*  --  30   ALT 23 27 33  --  16   ALKPHOS 35 40 36  --  86   BILITOTAL 0.3 0.3 0.9  --  0.4   ALBUMIN 2.6* 2.8* 2.3*  --  5.3*   INR  --   --  1.33* 1.60* 0.91     Pancreatic Enzymes  No lab results found in last 7 days.  Coagulation Profile  Recent Labs   Lab 06/29/22  0248 06/29/22  0007 06/28/22  1258   INR 1.33* 1.60* 0.91   PTT 52* 32 29         5. RADIOLOGY:   Recent Results (from the past 24 hour(s))   XR Abdomen Port 1 View    Narrative    EXAMINATION:  XR ABDOMEN PORT 1 VIEWS 6/30/2022 4:34 PM     COMPARISON: Earlier same day radiograph.    HISTORY: assess FT location.    TECHNIQUE: Frontal view of the abdomen.    FINDINGS: Enteric tube tip projects over the left hemipelvis.  Prominent gaseous distention of the stomach. Partially visualized  chest tubes in the thorax. Please see chest radiograph. Dong catheter  in the pelvis.      Impression    IMPRESSION: Enteric tube tip projects over the distal distended  stomach, likely near the pylorus versus proximal duodenum. Could  consider further advancement with repeat radiograph to reevaluate.    I have personally reviewed the examination and initial interpretation  and I agree with the findings.    YANNICK BENAVIDEZ MD         SYSTEM ID:  [id number]   XR Chest Port 1 View    Narrative    Chest radiograph  7/1/2022 1:42 AM      HISTORY: Postop lung    COMPARISON: 6/30/2022    FINDINGS:   Single AP view of the chest. Postoperative changes of bilateral lung  transplantation. Feeding tube courses beyond the field of view. Right  IJ Shelby-Dexter tip in the distal main/proximal right pulmonary artery.  Interval extubation. Stable bilateral chest tubes and mediastinal  drain. Stable mediastinum. No pneumothorax or  significant right  pleural effusion. Small left pleural effusion. Increased perihilar and  bibasilar interstitial and airspace opacities.      Impression    IMPRESSION:     1. Interval extubation.  2. Right IJ Soldier-Dexter tip in the distal main/proximal right pulmonary  artery. Stable remaining devices.  3. Increased perihilar and bibasilar opacities, favored atelectasis  and/or pulmonary edema  4. small left pleural effusion.    I have personally reviewed the examination and initial interpretation  and I agree with the findings.    NIKOS JAVED MD         SYSTEM ID:  [id number]       =========================================

## 2022-07-01 NOTE — PROGRESS NOTES
Pulmonary Medicine  Cystic Fibrosis - Lung Transplant Team  Progress Note  2022       Patient: Sofie Rodriguez  MRN: [id number]  : 1960 (age 62 year old)  Transplant: 2022 (Lung), POD#3  Admission date: 2022    Assessment & Plan:     Sofie Rodriguez is a 62 year old female with a PMH significant for end stage COPD, HTN, Hep C, and osteopenia as well as former methamphetamine use.  Pt. is now s/p BSLT on 22.  Surgery on CPB, uncomplicated, lungs slightly undersized for chest. Did require some moderate volume resuscitation with low dose pressors post transplant.     Recommendations today:   - Please advance NJ before advancing TFs  - My require some bipap intermittently with naps and overnight and would repeat VBG in AM and this afternoon  - Donor cultures: + for beta hemolytic strep- not group A, okay to stop vanc from our perspective today and can likely deescalate ceftaz tomorrow  - Explant with stenotrophomonas: Please start Bactrim DS BID from -  - Agree with SLP consult  - Agree with increasing bowel regimen    S/p bilateral sequential lung transplant (BSLT) for end stage COPD:  Acute on chronic hypoxic respiratory failure: Patient is currently on pressors had no evidence of PGD post operatively, required pressors for about 24 hours until adequately fluid resuscitated and then was extubated to 1-2L NC on POD 2.    - Nebs: levalbuterol and Mucomyst QID   - Aggressive pulmonary toilet with chest physiotherapy QID (addition of Aerobika and incentive spirometry once extubated)  - DSA on  (ordered) then one month post-transplant  - Ammonia monitoring every 48 hours x 3 weeks (screening for hyperammonemia post-lung transplant)  - Ventilator management per ICU team  - Bronch  with patent airways, no significant ischemic reperfusion injury, no significant edema, occasional mucous plugs in lower lobes bilaterally but removed upon therapeutic suctioning.   - PVTS catheters  placed 6/29  - Chest tubes managed by surgical team  - Daily CXR: reviewed, some cephalization and suspect may be fluid related but no significant pleural effusions  - Post-pyloric feeding tube placement as soon able for enteral nutrition, trickle feed rate only (max 20 ml/hr) with gastric access  - SLP consult for swallowing evaluation, no indication for NPO status post transplant from pulm perspective, would leave NJ in place until able to meet sudha counts x 3 days  - Explant pathology with end stage emphysema as expected and all lymph nodes benign.     Immunosuppression:  Induction therapy with basiliximab (and high dose IV steroid) given intraoperatively, repeating basiliximab dose on POD#4 (ordered for 7/2).  - Tacrolimus 1 mg SL BID.  Goal tacrolimus level 8-12. Trending daily levels  Date Tacro Level Intervention   6/29 <1.0 Continue current dose, 1 mg bid   6/30 3.0 Increase to 2 mg bid   7/1 6.9 No adjustment        - MMF 1500 mg BID  - Methylprednisolone 125 mg x 3 doses, then prednisolone BID with taper per lung transplant protocol:  Date AM dose (mg) PM dose (mg)   6/30 17.5 17.5   7/7 15 15   7/14 15 12.5   7/21 12.5 12.5   8/4 12.5 10   8/18 10 10   9/15 10 7.5   10/13 7.5 7.5   11/10 7.5 5   12/8 5 5   1/5/23 5 2.5     Prophylaxis:   - Bactrim for PJP ppx deferred initially post-op   - VGCV for CMV ppx (as below to start on 7/6)  - Nystatin for oral candidiasis ppx, 6 month course  - See below for serologies and viral ppx:   Donor Recipient Intervention   CMV status + + Valganciclovir POD #8-90   EBV status + + EBV check monthly   HSV status N/A + No Acyclovir prophylaxis     Primary graft dysfunction (per ISHLT guidelines):  See chart below:   POD #0  (~0 hours) POD #1  (~24 hours)   Date 6/29/22 6/30/22   Time 0535 0356   Intubated Y Y   PaO2 163 184   FiO2 40% 40   P/F Ratio 408 460   PGD Grade   (0=mild, 3=severe) 0 0   ECMO N N   Inhaled NO/Flolan N N       ID: Prior history of colonization with  Mycobacterium peregrinum    Fevers: Febrile to 100.6 on 6/30, resolved within 24 hours.   - Repeat blood cultures   - Continue Iv ceftaz/vanc x 14 day course empirically based on recipient gram stain and fever  - AFB to be sent on all future bronchs, last on 6/29  - IgG at one month 7/29  - Donor cultures: Rare beta hemolytic strep (Not group A)   - Recipient cultures: >25 PMNs, 1+GPCs.    Stenotrophomonas Maltophilia: Noted in right explanted lung washings.   - Start bactrim DS bid 7/1-7/11.     H/O Hep C: C: Diagnosed in 1980s, 2 mos of treatment, quant negative since 10/2017, last positive 2/20/17 ([phone]).Glenis positive on 6/2021 with negative HCV PCR. Hx of remote ETOH abuse. MR Elastography 4/27/21 with hepatology review and consult without any concerns post transplant.     History of steroid induced hyperglycemia: Well controlled in outpatient. Management by primary team currently on insulin gtt, may need endo consult prior to discharge.     We appreciate the excellent care provided by the CVTS and CVICU teams.  Recommendations communicated via in person rounding and this note.  Will continue to follow along closely, please do not hesitate to call with any questions or concerns.      We appreciate the excellent care provided by the CVTS team.  Recommendations communicated via in person rounding and this note.  Will continue to follow along closely, please do not hesitate to call with any questions or concerns.    Claribel Franks MD on 6/30/2022 at 12:23 PM     Subjective & Interval History:   Extubated on 6/30 afternoon to 1-2L NC placed on HFNC this morning. Pain well controlled with orals and erector spinae catheters. Off pressors. Afebrile for last 24 hours. Denies nausea or abdominal pain/distention, has not passed gas or had a BM yet. She denies any acid reflux. Her pain is very well controlled and rated at 0-1 this morning. Getting up to a chair today. Excited to get more lines out.     Review of  "Systems:   As noted per HPI    Physical Exam:     All notes, images, and labs from past 24 hours (at minimum) were reviewed.    Vital signs:  Temp: 98.4  F (36.9  C) Temp src: Bladder   Pulse: 80   Resp: 18 SpO2: 100 % O2 Device: Nasal cannula Oxygen Delivery: 30 LPM Height: 157.5 cm (5' 2\") Weight: 75.2 kg (165 lb 12.6 oz)  I/O:     Intake/Output Summary (Last 24 hours) at 7/1/2022 1444  Last data filed at 7/1/2022 1400  Gross per 24 hour   Intake 1244.19 ml   Output 3056 ml   Net -1811.81 ml         Constitutional: awake, alert, interactive in no apparent distress. Fully oriented  HEENT: Eyes with pink conjunctivae, anicteric.  Oral mucosa moist without lesions.  PULM: Good air flow bilaterally. Diminished in the bases All chest tubes in place  CV: Normal S1 and S2.  Tachycardic, regular.  No murmur, gallop, or rub.  No peripheral edema.   ABD: active bowel sounds, soft, nontender, nondistended.    MSK: Moves all extremities.  No apparent muscle wasting.   NEURO: Alert and oriented, conversant.   SKIN: Warm, dry.  No rash on limited exam.   PSYCH: Mood stable.     Lines, Drains, and Devices:  Peripheral IV 06/28/22 Anterior;Distal;Left Lower forearm (Active)   Site Assessment [facility] 06/30/22 0800   Line Status Saline locked 06/30/22 0800   Phlebitis Scale 0-->no symptoms 06/30/22 0800   Infiltration Scale 0 06/30/22 0800   Number of days: 2       Peripheral IV 06/28/22 Right Upper forearm (Active)   Site Assessment [facility] 06/30/22 0800   Line Status Infusing;Checked every 1-2 hour 06/30/22 0800   Phlebitis Scale 0-->no symptoms 06/30/22 0800   Infiltration Scale 0 06/30/22 0800   Number of days: 2       Arterial Line 06/28/22 Radial (Active)   Site Assessment [facility] 06/30/22 0800   Line Status Positional 06/30/22 0800   Art Line Waveform Appropriate 06/30/22 0800   Art Line Interventions Leveled;Connections checked and tightened 06/30/22 0800   Color/Movement/Sensation Capillary refill less than 3 sec 06/30/22 0800   Line " Necessity Yes, meets criteria 06/30/22 0800   Dressing Type Transparent 06/30/22 0800   Dressing Status Clean, dry, intact 06/30/22 0800   Dressing Intervention Dressing changed/new dressing 06/29/22 2000   Dressing Change Due 07/03/22 06/30/22 0400   Number of days: 2       CVC Double Lumen Right Internal jugular (Active)   Site Assessment [facility] 06/30/22 0800   Dressing Type Transparent;Chlorhexidine disk 06/30/22 0800   Dressing Status intact 06/29/22 2000   Dressing Intervention new dressing;dressing changed 06/29/22 1600   Dressing Change Due 07/03/22 06/29/22 2000   Tubing Change secondary tubing;primary tubing 06/29/22 0400   Line Necessity yes, meets criteria 06/30/22 0800   Brown - Status infusing 06/30/22 0800   Brown - Cap Change Due 07/01/22 06/29/22 2000   White - Status infusing 06/30/22 0800   White - Cap Change Due 07/01/22 06/29/22 2000   Phlebitis Scale 0-->no symptoms 06/30/22 0800   Infiltration? no 06/30/22 0800   Infiltration Scale 0 06/30/22 0800   CVC Comment PA catheter in place 06/30/22 0400   Number of days: 2       Pulmonary Artery Catheter - Triple Lumen 06/28/22  Right internal jugular vein (Active)   Dressing dressing dry and intact 06/30/22 0800   Securement catheter securement device utilized 06/30/22 0800   PA Catheter Markings (cm) 53 06/30/22 0800   Phlebitis 0-->no symptoms 06/30/22 0800   Infiltration 0-->no symptoms 06/30/22 0800   Waveform normal 06/30/22 0800   Lumen A - Color YELLOW 06/30/22 0800   Lumen A - Status transduced 06/30/22 0800   Lumen A - Cap Change Due 07/01/22 06/29/22 2000   Lumen B - Color BLUE 06/30/22 0800   Lumen B - Status transduced 06/30/22 0800   Lumen B - Cap Change Due 07/01/22 06/29/22 2000   Lumen C - Color WHITE 06/30/22 0800   Lumen C - Status infusing 06/30/22 0800   Lumen C - Cap Change Due 07/01/22 06/29/22 2000   Number of days: 2     Data:     LABS    CMP:   Recent Labs   Lab 07/01/22  0409 07/01/22  0223 07/01/22  0009 06/30/22  2228  06/30/22  0401 06/30/22  0355 06/29/22  2351 06/29/22  2341 06/29/22  1636 06/29/22  1629 06/29/22  0250 06/29/22  0248 06/28/22  1745 06/28/22  1258     --   --   --   --  141  --  140  --   --   --  139   < > 139   POTASSIUM 4.5  --   --   --   --  4.0  --  4.1  --   --   --  4.0   < > 4.2   CHLORIDE 107  --   --   --   --  107  --  105  --   --   --  100  --  93*   CO2 29  --   --   --   --  26  --  26  --   --   --  24  --  35*   ANIONGAP 6*  --   --   --   --  8  --  9  --   --   --  15  --  11   GLC 92 150* 122* 128*   < > 115*   < > 190*   < >  --    < > 159*   < > 96   BUN 28.1*  --   --   --   --  19.6  --  20.6  --   --   --  16.5  --  14.2   CR 1.01*  --   --   --   --  0.98*  --  1.00*  --   --   --  0.94  --  0.85   GFRESTIMATED 63  --   --   --   --  66  --  64  --   --   --  69  --  78   ESTUARDO 8.0*  --   --   --   --  7.8*  --  7.9*  --   --   --  8.1*  --  11.1*   MAG 2.7*  --   --   --   --  2.9*  --  2.0  --   --   --  2.3  --  2.2   PHOS 4.9*  --   --   --   --  3.8  --  3.9  --  2.1*  --  1.6*  --  3.3   PROTTOTAL  --   --   --   --   --  4.0*  --  4.1*  --   --   --  3.7*  --  7.9   ALBUMIN  --   --   --   --   --  2.6*  --  2.8*  --   --   --  2.3*  --  5.3*   BILITOTAL  --   --   --   --   --  0.3  --  0.3  --   --   --  0.9  --  0.4   ALKPHOS  --   --   --   --   --  35  --  40  --   --   --  36  --  86   AST  --   --   --   --   --  65*  --  78*  --   --   --  143*  --  30   ALT  --   --   --   --   --  23  --  27  --   --   --  33  --  16    < > = values in this interval not displayed.     CBC:   Recent Labs   Lab 07/01/22  0409 06/30/22  0355 06/29/22  2341 06/29/22  1632 06/29/22  1007 06/29/22  0536   WBC 12.0* 22.2* 21.5*  --   --  16.2*   RBC 3.25* 3.14* 3.26*  --   --  2.48*   HGB 10.1* 9.8* 10.2* 9.8*   < > 7.6*   HCT 32.0* 28.7* 29.5*  --   --  23.1*   MCV 99 91 91  --   --  93   MCH 31.1 31.2 31.3  --   --  30.6   MCHC 31.6 34.1 34.6  --   --  32.9   RDW 15.9* 15.5* 15.5*  --    --  15.1*   PLT 73* 91* 98*  --   --  103*    < > = values in this interval not displayed.       INR:   Recent Labs   Lab 06/29/22  0248 06/29/22  0007 06/28/22  1258   INR 1.33* 1.60* 0.91       Glucose:   Recent Labs   Lab 07/01/22  0409 07/01/22  0223 07/01/22  0009 06/30/22 2220 06/30/22 2043 06/30/22  1807   GLC 92 150* 122* 128* 133* 144*       Blood Gas:   Recent Labs   Lab 07/01/22  0409 06/30/22 2013 06/30/22  1343   PHV 7.26* 7.30* 7.35   PCO2V 70* 64* 53*   PO2V 47 36 40   HCO3V 31* 32* 29*   LINDY 3.0* 4.0* 2.7*   O2PER 28 21 40       Culture Data No results for input(s): CULT in the last 168 hours.    Virology Data:   Lab Results   Component Value Date    FLUAH1 Not Detected 06/29/2022    FLUAH3 Not Detected 06/29/2022    PN6803 Not Detected 06/29/2022    IFLUB Not Detected 06/29/2022    RSVA Not Detected 06/29/2022    RSVB Not Detected 06/29/2022    PIV1 Not Detected 06/29/2022    PIV2 Not Detected 06/29/2022    PIV3 Not Detected 06/29/2022    HMPV Not Detected 06/29/2022       Historical CMV results (last 3 of prior testing):  No results found for: CMVQNT  No results found for: CMVLOG    Urine Studies    Recent Labs   Lab Test 06/28/22  1309 06/14/21  0939   URINEPH 5.0 5.0   NITRITE Negative Negative   LEUKEST Negative Trace*   WBCU 1 8*       Most Recent [place] Pulmonary Function Testing (FVC/FEV1 only)  FVC-Pre   Date Value Ref Range Status   04/29/2022 1.82 L    11/11/2021 2.17 L    06/14/2021 2.00 L      FVC-%Pred-Pre   Date Value Ref Range Status   04/29/2022 58 %    11/11/2021 70 %    06/14/2021 64 %      FEV1-Pre   Date Value Ref Range Status   04/29/2022 0.51 L    11/11/2021 0.53 L    06/14/2021 0.54 L      FEV1-%Pred-Pre   Date Value Ref Range Status   04/29/2022 20 %    11/11/2021 21 %    06/14/2021 21 %        IMAGING    Recent Results (from the past 48 hour(s))   XR Chest 2 Views    Narrative    Exam: XR CHEST 2 VW, 6/28/2022 2:09 PM    Comparison: CT chest 4/4/2022, chest x-ray  6/14/2021    History: pre-op lung transplant    Findings:  PA and lateral views of the chest.  Trachea is midline. The heart size  is within normal limits. Atherosclerotic ectasia of the aortic arch.  Hyperinflated lungs with emphysematous changes. No acute airspace  disease. There is no pneumothorax or pleural effusion. The upper  abdomen is unremarkable. No acute osseous abnormality. Flattened  hemidiaphragms on the lateral view.      Impression    Impression:   Hyperinflated lungs with emphysematous changes. No acute airspace  disease.     I have personally reviewed the examination and initial interpretation  and I agree with the findings.    ALVINA HARRY MD         SYSTEM ID:  [id number]   XR Chest Port 1 View    Narrative    XR CHEST PORT 1 VIEW  6/29/2022 3:23 AM      HISTORY: s/p bilateral lung transplant    COMPARISON: 6/28/2022    FINDINGS:   Single AP view of the chest. New postoperative changes of bilateral  lung transplantation. Endotracheal tube tip overlying the mid thoracic  trachea. Right IJ Paramount-Dexter catheter tip overlying the main pulmonary  artery. Biapical and bibasilar chest tubes. Pericardial drain. Enteric  tube courses beyond the field-of-view. Trachea is midline. Cardiac  silhouette is within normal limits. No pneumothorax or significant  pleural effusion. Perihilar and bibasilar streaky opacities.  Subcutaneous emphysema along the lateral chest walls.      Impression    IMPRESSION:   1. Postoperative chest with mild perihilar and bibasilar atelectasis.  2. Endotracheal tube tip approximately 6 cm above the nabil.  3. Right IJ Paramount-Dexter catheter tip in the main pulmonary artery.    I have personally reviewed the examination and initial interpretation  and I agree with the findings.    CECI REGAN DO         SYSTEM ID:  [id number]   POC US Guidance Needle Placement    Impression    Bilateral CLARI catheters   XR Abdomen Port 1 View    Narrative    EXAMINATION:  XR ABDOMEN PORT 1 VIEWS  6/29/2022 12:27 PM     COMPARISON: PET 7/14/2021.    HISTORY: Verify small bowel feeding tube bedside placement.    FINDINGS: Frontal view of the abdomen. Feeding tube tip likely  projects over distended distal stomach. Bibasilar chest tubes.  Madrid-Dexter catheter tip projects over the main pulmonary artery. No  abnormally dilated loops of bowel. Postsurgical changes of bilateral  lung transplantation with clamshell sternotomy wires.       Impression    IMPRESSION: Feeding tube tip overlying the central abdomen likely  projecting over distended distal stomach.    I have personally reviewed the examination and initial interpretation  and I agree with the findings.    NIKOS JAVED MD         SYSTEM ID:  [id number]   XR Chest Port 1 View    Narrative    EXAM: XR CHEST PORT 1 VIEW  6/29/2022 5:22 PM     HISTORY:  lung txp       COMPARISON:  Radiograph earlier same day, 6/20/2022.    TECHNIQUE: AP view of the chest at 60 degrees    FINDINGS:   Devices, lines, tubes: Endotracheal tube, bilateral chest tubes,  pericardial drain, and right internal jugular Madrid-Dexter catheter are  in similar positioning. Interval placement of a bulge is a catheters.  Feeding tube tip coursing inferior to the left hemidiaphragm with tip  out of the field of view. Clamshell sternotomy wires are intact.    Postoperative changes of bilateral lung transplantation. The trachea  is midline. The cardiomediastinal silhouette is within normal limits.  Similar perihilar and bibasilar streaky opacities..  No appreciable  pneumothorax, pleural effusion, or focal consolidative opacity. No  acute osseous abnormality.  Decreased amount of percutaneous emphysema  projecting over the lateral chest walls.      Impression    IMPRESSION:   1. Similar perihilar and bibasilar streaky opacities, favoring  postoperative atelectasis and/or edema.  2. No appreciable pneumothoraces with bilateral chest tubes in place.    I have personally reviewed the examination and  initial interpretation  and I agree with the findings.    MONSERRAT SEGAL MD         SYSTEM ID:  [id number]   XR Chest Port 1 View    Narrative    XR CHEST PORT 1 VIEW  6/30/2022 1:05 AM      HISTORY: Post-Op Lung    COMPARISON: 6/29/2022    FINDINGS:   Single AP view of the chest. Postoperative changes of bilateral lung  transplantation. Feeding tube courses beyond the field-of-view.  Endotracheal tube tip overlying the mid thoracic trachea. Right IJ  Cotulla-Dexter catheter tip over the proximal right pulmonary artery.  Bilateral chest tubes and mediastinal drain in similar position.  Stable mediastinum. No pneumothorax or significant right pleural  effusion. Small left pleural effusion. Largely unchanged perihilar and  left basilar streaky opacities.      Impression    IMPRESSION:   1. Stable support devices.  2. Largely unchanged perihilar and left basilar opacities with small  left pleural effusion.     I have personally reviewed the examination and initial interpretation  and I agree with the findings.    YANNICK BENAVIDEZ MD         SYSTEM ID:  [id number]   XR Abdomen Port 1 View    Narrative    XR ABDOMEN PORT 1 VIEWS  6/30/2022 1:10 AM      HISTORY: Verify small bowel feeding tube bedside placement    COMPARISON: 6/29/2022    FINDINGS:   Single supine view of the chest and abdomen. Refer to same day chest  radiograph report for findings of the visualized chest. Feeding tube  tip over the distal distended stomach. Nonobstructive bowel gas  pattern. No definite pneumatosis or portal venous gas.      Impression    IMPRESSION:   Feeding tube tip over the distal distended stomach. Recommend  repositioning if postpyloric is desired.    I have personally reviewed the examination and initial interpretation  and I agree with the findings.    YANNICK BENAVIDEZ MD         SYSTEM ID:  [id number]

## 2022-07-01 NOTE — CONSULTS
7/1/2022 Gastroenterology Brief Note    Chart reviewed. This is a 63yo F POD3 post lung transplant who needs post pyloric feeding tube placement. After multiple attempts by radiology the tube cannot be advanced beyond the stomach. Patient not receiving gastric feeds given risk for aspiration. No anticoagulation needed to be held. INR 1.33.    Plan for endoscopic placement in ICU under fluoro today. Please continue to hold tube feeds.  Call [phone] (GI Endo Charge RN) for estimated time of procedure    Above in collaboration/discussed with Dr. Nickerson, GI attending    Rika Kirk PA-C  [facility]  Pager *[phone]

## 2022-07-01 NOTE — PLAN OF CARE
ICU End of Shift Summary. See flowsheets for vital signs and detailed assessment.    Changes this shift: AOVSS, on 2L Supp O2. Weaned to RA and lasted approx 20 minutes before desat to 88%. Pulled SWAN and A-line; CMS improved per pt report to [facility] ([facility]) Up to chair 2x hours only, d/t multiple procedures. XR guided NJ (unsuccessful), f/b EGD w/ fluoro (successful). SLP unable to perform official FEES d/t pt availability. Interim, will start TF (dietician/MD pgd for rx, still pending...) and SLP will f/u Sunday for swallow study. BGs maintaining on 0.5u/hr; however gtt stopped for BG 75, with subsequent hypoglycemia event at end of shift for 61; 1/2amp D50 given with AKKU rechecks 94 & 113. Pt drowsy, however otherwise Ox4 acting appropriately throughout episode. No BM. Senna/Miralax given. Voiding adequately. Dong exchanged with Purewick.     Plan: Pulm toilet. Supportive Cares.     Goal Outcome Evaluation:    Plan of Care Reviewed With: patient, family     Overall Patient Progress: improving    Outcome Evaluation: 2L Supp O2. No Pressors. Makes needs known. CO2 still high @ 68. Will do intermittent BiPap

## 2022-07-01 NOTE — OP NOTE
Upper GI Endoscopy 07/01/2022  2:01 PM [address] [place] [address] [phone]     Endoscopy Department   _______________________________________________________________________________   Patient Name: Sofie Rodriguez            Procedure Date: 7/1/2022 2:01 PM   MRN: [id number]                       Account Number: [id number]   YOB: 1960               Admit Type: Inpatient   Age: 62                               Room: [place] [address]   Gender: Female                        Note Status: Finalized   Attending MD: TENZIN LOVE MD  Total Sedation Time:   _______________________________________________________________________________       Procedure:             Upper GI endoscopy   Indications:           Malnutrition   Providers:             TENZIN LOVE MD, Sofie Yuen RN, LINDSEY RAMON MD   Patient Profile:       Ms Rodriguez is a 63yo woman post dual lung transplant                          who requies postpyloric tube feeding and proceeds now                          to an upper endoscopy for tube placement.   Referring MD:          Vinny Berrios   Medicines:             Fentanyl 75 micrograms IV, Midazolam 3 mg IV   Complications:         No immediate complications.   _______________________________________________________________________________   Procedure:             Pre-Anesthesia Assessment:                          - Prior to the procedure, a History and Physical was                          performed, and patient medications and allergies were                          reviewed. The patient is competent. The risks and                          benefits of the procedure and the sedation options and                          risks were discussed with the patient. All questions                          were answered and informed consent was obtained.                          Patient  identification and proposed procedure were                          verified by the nurse in the pre-procedure area.                          Mental Status Examination: alert and oriented. Airway                          Examination: Mallampati Class II (the uvula but not                          tonsillar pillars visualized). Respiratory                          Examination: clear to auscultation. CV Examination:                          normal. ASA Grade Assessment: III - A patient with                          severe systemic disease. After reviewing the risks and                          benefits, the patient was deemed in satisfactory                          condition to undergo the procedure. The anesthesia                          plan was to use moderate sedation / analgesia                          (conscious sedation). Immediately prior to                          administration of medications, the patient was                          re-assessed for adequacy to receive sedatives. The                          heart rate, respiratory rate, oxygen saturations,                          blood pressure, adequacy of pulmonary ventilation, and                          response to care were monitored throughout the                          procedure. The physical status of the patient was                          re-assessed after the procedure. After obtaining                          informed consent, the endoscope was passed under                          direct vision. Throughout the procedure, the patient's                          blood pressure, pulse, and oxygen saturations were                          monitored continuously. The peds gastroscope was                          introduced through the mouth, and advanced to the                          second part of duodenum. The upper GI endoscopy was                          accomplished without difficulty. The patient tolerated                          " the procedure well.                                                                                     Findings:         films demonstrated wires. A pediatric gastroscope was passed to        the duodenum without issue through the right nare. Grossly unremarkable        foregut on white light endsocopy. An 0.035\" Glidewire was exchanged and        curled in the proximal jejunum. Over this wire a 12F [place] NJ was        passed with the tip confirmed by fluoroscopy in the distal duodenum. The        tube was then secured with a bridle.                                                                                     Impression:            - Uncomplicated placement of a 12F [place] NJ tube with                          the distal tip confirmed in the distal duodenum   Recommendation:        - Standard observation during sedation recovery                          - NJ tube may be used for feeds as per dietitian                          instruction without delay                          - The findings and recommendations were discussed with                          the patient and their family                                                                                      electronically signed by RANGEL Nickerson        "

## 2022-07-01 NOTE — PROGRESS NOTES
"Pain Service Progress Note  [facility]  Date: 07/01/2022       Patient Name: Sofie Rodriguez  MRN: [id number]  Age: 62 year old  Sex: female      Assessment:  Sofie Rodriguez is a 62 year old female with a PMH significant for end stage COPD, HTN, Hep C, and osteopenia as well as former methamphetamine use.  Pt. 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     Procedure: bilateral lung transplant    Date of Surgery: 6/28/22    Date of Catheter Placement: 6/29/22    Plan/Recommendations:  1. Regional Anesthesia/Analgesia  -Continuous Catheter Type/Site: bilateral erector spinae (ES) T5-6  -Continue 0.2% ropivacaine    -Programmed Intermittent Bolus (PIB) at 7 mL Q60 min via each catheter, total infusion rate of 14 mL/hr  -No pain this morning, no bolus given     Plan to maintain catheter, max of 7 days    2. Anticoagulation  -heparin ANTICOAGULANT injection 5,000 Units, SC, Q8H TONY     -Please contact Inpatient Pain Service before ordering or making any anticoagulation changes       3. Multimodal Analgesia  - per primary service    Pain Service will continue to follow.    Discussed with attending anesthesiologist    Please Page the Pain Team Via [employer]: \"Adult acute inpatient pain management/[facility]\"    Carroll Gomez MD   Anesthesiology Resident  07/01/2022     Overnight Events: Extubated.     Tubes/Drains: Yes      Subjective: Denies pain, comfortable.      Symptoms of LAST: No    Pain Location:  Denies pain    Pain Intensity:    Pain at Rest: 0/10     Satisfied with your level of pain control: Yes    Diet: NPO for Medical/Clinical Reasons Except for: No Exceptions  Adult Formula Drip Feeding: Continuous Osmolite 1.5; Nasogastric tube; Goal Rate: 45; initiate at 15 ml/hr and advance by 15 ml/hr q8h; do not initiate until FT placement verified; mL/hr; Medication - Feeding Tube Flush Frequency: At least 15-30 m...    Relevant Labs:  Recent Labs   Lab Test 06/30/22  0355 06/29/22  0536 06/29/22  0248   INR  " "--   --  1.33*   PLT 91*   < > 124*   PTT  --   --  52*   BUN 19.6   < > 16.5    < > = values in this interval not displayed.       Physical Exam:  Vitals: /56   Pulse 80   Temp 36.9  C (98.4  F)   Resp 18   Ht 1.575 m (5' 2\")   Wt 75.2 kg (165 lb 12.6 oz)   SpO2 100%   BMI 30.32 kg/m      Physical Exam:   Orientation:  Alert, oriented, and in no acute distress: Yes  Sedation: No    Motor Examination:  5/5 Strength in lower extremities: No      Catheter Site:   Catheter entry site is clean/dry/intact: RN reports site is c,d,i.          Relevant Medications:  Current Pain Medications:  Medications related to Pain Management (From now, onward)    Start     Dose/Rate Route Frequency Ordered Stop    07/01/22 0000  acetaminophen (TYLENOL) tablet 650 mg         650 mg Oral EVERY 4 HOURS PRN 06/29/22 0240      06/29/22 1930  dexmedetomidine (PRECEDEX) 400 mcg in 0.9% sodium chloride 100 mL         0.1-1.2 mcg/kg/hr × 65.7 kg (Dosing Weight)  1.6-19.7 mL/hr  Intravenous CONTINUOUS 06/29/22 1926      06/29/22 1200  ropivacaine 0.2% in NS perineural infusion simple          Perineural Continuous Nerve Block 06/29/22 1042      06/29/22 1200  ropivacaine 0.2% in NS perineural infusion simple          Perineural Continuous Nerve Block 06/29/22 1042      06/29/22 0800  senna-docusate (SENOKOT-S/PERICOLACE) 8.6-50 MG per tablet 1 tablet         1 tablet Oral 2 TIMES DAILY 06/29/22 0240      06/29/22 0800  polyethylene glycol (MIRALAX) Packet 17 g         17 g Oral DAILY 06/29/22 0240      06/29/22 0800  gabapentin (NEURONTIN) capsule 100 mg         100 mg Oral 3 TIMES DAILY 06/29/22 0240      06/29/22 0330  fentaNYL (SUBLIMAZE) infusion          mcg/hr  1-2 mL/hr  Intravenous CONTINUOUS 06/29/22 0301      06/29/22 0330  propofol (DIPRIVAN) infusion         5-75 mcg/kg/min × 65.7 kg  2-29.6 mL/hr  Intravenous CONTINUOUS 06/29/22 0301      06/29/22 0239  acetaminophen (TYLENOL) tablet 975 mg         975 mg Oral " "EVERY 8 HOURS 06/29/22 0240 07/02/22 0559    06/29/22 0239  magnesium hydroxide (MILK OF MAGNESIA) suspension 30 mL         30 mL Oral DAILY PRN 06/29/22 0240      06/29/22 0239  bisacodyl (DULCOLAX) suppository 10 mg         10 mg Rectal DAILY PRN 06/29/22 0240      06/29/22 0239  HYDROmorphone (DILAUDID) injection 0.2 mg        \"Or\" Linked Group Details    0.2 mg Intravenous EVERY 2 HOURS PRN 06/29/22 0240      06/29/22 0239  HYDROmorphone (DILAUDID) injection 0.4 mg        \"Or\" Linked Group Details    0.4 mg Intravenous EVERY 2 HOURS PRN 06/29/22 0240      06/29/22 0239  oxyCODONE (ROXICODONE) tablet 5 mg        \"Or\" Linked Group Details    5 mg Oral EVERY 4 HOURS PRN 06/29/22 0240      06/29/22 0239  oxyCODONE IR (ROXICODONE) tablet 10 mg        \"Or\" Linked Group Details    10 mg Oral EVERY 4 HOURS PRN 06/29/22 0240      06/29/22 0239  lidocaine 1 % 0.1-1 mL         0.1-1 mL Other EVERY 1 HOUR PRN 06/29/22 0240      06/29/22 0239  lidocaine (LMX4) cream          Topical EVERY 1 HOUR PRN 06/29/22 0240                    "

## 2022-07-02 ENCOUNTER — APPOINTMENT (OUTPATIENT)
Dept: GENERAL RADIOLOGY | Facility: CLINIC | Age: 62
DRG: 007 | End: 2022-07-02
Attending: STUDENT IN AN ORGANIZED HEALTH CARE EDUCATION/TRAINING PROGRAM
Payer: MEDICARE

## 2022-07-02 ENCOUNTER — ANESTHESIA EVENT (OUTPATIENT)
Dept: SURGERY | Facility: CLINIC | Age: 62
DRG: 007 | End: 2022-07-02
Payer: MEDICARE

## 2022-07-02 ENCOUNTER — ANESTHESIA (OUTPATIENT)
Dept: SURGERY | Facility: CLINIC | Age: 62
DRG: 007 | End: 2022-07-02
Payer: MEDICARE

## 2022-07-02 ENCOUNTER — APPOINTMENT (OUTPATIENT)
Dept: ULTRASOUND IMAGING | Facility: CLINIC | Age: 62
DRG: 007 | End: 2022-07-02
Attending: THORACIC SURGERY (CARDIOTHORACIC VASCULAR SURGERY)
Payer: MEDICARE

## 2022-07-02 LAB
ABO/RH(D): NORMAL
ANION GAP SERPL CALCULATED.3IONS-SCNC: 6 MMOL/L (ref 7–15)
ANION GAP SERPL CALCULATED.3IONS-SCNC: 9 MMOL/L (ref 7–15)
ANTIBODY SCREEN: NEGATIVE
BACTERIA SPEC CULT: ABNORMAL
BACTERIA SPT CULT: ABNORMAL
BASE EXCESS BLDA CALC-SCNC: -0.5 MMOL/L (ref -9–1.8)
BASE EXCESS BLDV CALC-SCNC: -0.3 MMOL/L (ref -7.7–1.9)
BASE EXCESS BLDV CALC-SCNC: 0.2 MMOL/L (ref -7.7–1.9)
BASE EXCESS BLDV CALC-SCNC: 1.4 MMOL/L (ref -7.7–1.9)
BASE EXCESS BLDV CALC-SCNC: 1.9 MMOL/L (ref -7.7–1.9)
BASE EXCESS BLDV CALC-SCNC: 2.9 MMOL/L (ref -7.7–1.9)
BASE EXCESS BLDV CALC-SCNC: 5.1 MMOL/L (ref -7.7–1.9)
BASOPHILS # BLD AUTO: 0 10E3/UL (ref 0–0.2)
BASOPHILS NFR BLD AUTO: 0 %
BITE CELLS BLD QL SMEAR: SLIGHT
BUN SERPL-MCNC: 39.7 MG/DL (ref 8–23)
BUN SERPL-MCNC: 44.6 MG/DL (ref 8–23)
CALCIUM SERPL-MCNC: 7.8 MG/DL (ref 8.8–10.2)
CALCIUM SERPL-MCNC: 8.1 MG/DL (ref 8.8–10.2)
CHLORIDE SERPL-SCNC: 106 MMOL/L (ref 98–107)
CHLORIDE SERPL-SCNC: 109 MMOL/L (ref 98–107)
CREAT SERPL-MCNC: 1.43 MG/DL (ref 0.51–0.95)
CREAT SERPL-MCNC: 1.56 MG/DL (ref 0.51–0.95)
DEPRECATED HCO3 PLAS-SCNC: 28 MMOL/L (ref 22–29)
DEPRECATED HCO3 PLAS-SCNC: 28 MMOL/L (ref 22–29)
EOSINOPHIL # BLD AUTO: 0 10E3/UL (ref 0–0.7)
EOSINOPHIL NFR BLD AUTO: 0 %
ERYTHROCYTE [DISTWIDTH] IN BLOOD BY AUTOMATED COUNT: 15.6 % (ref 10–15)
ERYTHROCYTE [DISTWIDTH] IN BLOOD BY AUTOMATED COUNT: 15.7 % (ref 10–15)
GFR SERPL CREATININE-BSD FRML MDRD: 38 ML/MIN/1.73M2
GFR SERPL CREATININE-BSD FRML MDRD: 42 ML/MIN/1.73M2
GLUCOSE BLDC GLUCOMTR-MCNC: 114 MG/DL (ref 70–99)
GLUCOSE BLDC GLUCOMTR-MCNC: 154 MG/DL (ref 70–99)
GLUCOSE BLDC GLUCOMTR-MCNC: 158 MG/DL (ref 70–99)
GLUCOSE BLDC GLUCOMTR-MCNC: 165 MG/DL (ref 70–99)
GLUCOSE BLDC GLUCOMTR-MCNC: 177 MG/DL (ref 70–99)
GLUCOSE BLDC GLUCOMTR-MCNC: 242 MG/DL (ref 70–99)
GLUCOSE SERPL-MCNC: 113 MG/DL (ref 70–99)
GLUCOSE SERPL-MCNC: 164 MG/DL (ref 70–99)
GRAM STAIN RESULT: ABNORMAL
HCO3 BLD-SCNC: 29 MMOL/L (ref 21–28)
HCO3 BLDV-SCNC: 30 MMOL/L (ref 21–28)
HCO3 BLDV-SCNC: 31 MMOL/L (ref 21–28)
HCO3 BLDV-SCNC: 31 MMOL/L (ref 21–28)
HCO3 BLDV-SCNC: 32 MMOL/L (ref 21–28)
HCO3 BLDV-SCNC: 32 MMOL/L (ref 21–28)
HCO3 BLDV-SCNC: 33 MMOL/L (ref 21–28)
HCT VFR BLD AUTO: 30.7 % (ref 35–47)
HCT VFR BLD AUTO: 33.1 % (ref 35–47)
HGB BLD-MCNC: 9.2 G/DL (ref 11.7–15.7)
HGB BLD-MCNC: 9.7 G/DL (ref 11.7–15.7)
IMM GRANULOCYTES # BLD: 0.1 10E3/UL
IMM GRANULOCYTES NFR BLD: 1 %
LACTATE SERPL-SCNC: 0.4 MMOL/L (ref 0.7–2)
LACTATE SERPL-SCNC: 0.5 MMOL/L (ref 0.7–2)
LYMPHOCYTES # BLD AUTO: 0.3 10E3/UL (ref 0.8–5.3)
LYMPHOCYTES NFR BLD AUTO: 2 %
MAGNESIUM SERPL-MCNC: 2.6 MG/DL (ref 1.7–2.3)
MAGNESIUM SERPL-MCNC: 2.8 MG/DL (ref 1.7–2.3)
MCH RBC QN AUTO: 30.3 PG (ref 26.5–33)
MCH RBC QN AUTO: 30.5 PG (ref 26.5–33)
MCHC RBC AUTO-ENTMCNC: 29.3 G/DL (ref 31.5–36.5)
MCHC RBC AUTO-ENTMCNC: 30 G/DL (ref 31.5–36.5)
MCV RBC AUTO: 102 FL (ref 78–100)
MCV RBC AUTO: 103 FL (ref 78–100)
MONOCYTES # BLD AUTO: 0.6 10E3/UL (ref 0–1.3)
MONOCYTES NFR BLD AUTO: 4 %
NEUTROPHILS # BLD AUTO: 13.2 10E3/UL (ref 1.6–8.3)
NEUTROPHILS NFR BLD AUTO: 93 %
NRBC # BLD AUTO: 0 10E3/UL
NRBC BLD AUTO-RTO: 0 /100
O2/TOTAL GAS SETTING VFR VENT: 30 %
OXYHGB MFR BLDV: 67 % (ref 70–75)
OXYHGB MFR BLDV: 71 % (ref 70–75)
OXYHGB MFR BLDV: 74 % (ref 70–75)
OXYHGB MFR BLDV: 78 % (ref 70–75)
OXYHGB MFR BLDV: 79 % (ref 70–75)
OXYHGB MFR BLDV: 82 % (ref 70–75)
PCO2 BLD: 77 MM HG (ref 35–45)
PCO2 BLDV: 72 MM HG (ref 40–50)
PCO2 BLDV: 78 MM HG (ref 40–50)
PCO2 BLDV: 79 MM HG (ref 40–50)
PCO2 BLDV: 81 MM HG (ref 40–50)
PCO2 BLDV: 83 MM HG (ref 40–50)
PCO2 BLDV: 91 MM HG (ref 40–50)
PH BLD: 7.19 [PH] (ref 7.35–7.45)
PH BLDV: 7.14 [PH] (ref 7.32–7.43)
PH BLDV: 7.16 [PH] (ref 7.32–7.43)
PH BLDV: 7.2 [PH] (ref 7.32–7.43)
PH BLDV: 7.2 [PH] (ref 7.32–7.43)
PH BLDV: 7.22 [PH] (ref 7.32–7.43)
PH BLDV: 7.27 [PH] (ref 7.32–7.43)
PHOSPHATE SERPL-MCNC: 3.2 MG/DL (ref 2.5–4.5)
PHOSPHATE SERPL-MCNC: 4.8 MG/DL (ref 2.5–4.5)
PLAT MORPH BLD: ABNORMAL
PLATELET # BLD AUTO: 117 10E3/UL (ref 150–450)
PLATELET # BLD AUTO: 120 10E3/UL (ref 150–450)
PO2 BLD: 93 MM HG (ref 80–105)
PO2 BLDV: 37 MM HG (ref 25–47)
PO2 BLDV: 39 MM HG (ref 25–47)
PO2 BLDV: 42 MM HG (ref 25–47)
PO2 BLDV: 44 MM HG (ref 25–47)
PO2 BLDV: 50 MM HG (ref 25–47)
PO2 BLDV: 52 MM HG (ref 25–47)
POTASSIUM SERPL-SCNC: 4.1 MMOL/L (ref 3.4–5.3)
POTASSIUM SERPL-SCNC: 4.7 MMOL/L (ref 3.4–5.3)
RADIOLOGIST FLAGS: ABNORMAL
RBC # BLD AUTO: 3.02 10E6/UL (ref 3.8–5.2)
RBC # BLD AUTO: 3.2 10E6/UL (ref 3.8–5.2)
RBC MORPH BLD: ABNORMAL
SODIUM SERPL-SCNC: 143 MMOL/L (ref 136–145)
SODIUM SERPL-SCNC: 143 MMOL/L (ref 136–145)
SPECIMEN EXPIRATION DATE: NORMAL
TACROLIMUS BLD-MCNC: 9.1 UG/L (ref 5–15)
TME LAST DOSE: NORMAL H
TME LAST DOSE: NORMAL H
UFH PPP CHRO-ACNC: >1.1 IU/ML
WBC # BLD AUTO: 13.7 10E3/UL (ref 4–11)
WBC # BLD AUTO: 14.1 10E3/UL (ref 4–11)

## 2022-07-02 PROCEDURE — 99232 SBSQ HOSP IP/OBS MODERATE 35: CPT | Mod: 24 | Performed by: ANESTHESIOLOGY

## 2022-07-02 PROCEDURE — 999N000185 HC STATISTIC TRANSPORT TIME EA 15 MIN

## 2022-07-02 PROCEDURE — 71045 X-RAY EXAM CHEST 1 VIEW: CPT

## 2022-07-02 PROCEDURE — 82310 ASSAY OF CALCIUM: CPT | Performed by: STUDENT IN AN ORGANIZED HEALTH CARE EDUCATION/TRAINING PROGRAM

## 2022-07-02 PROCEDURE — 93931 UPPER EXTREMITY STUDY: CPT | Mod: 26 | Performed by: RADIOLOGY

## 2022-07-02 PROCEDURE — 200N000002 HC R&B ICU UMMC

## 2022-07-02 PROCEDURE — 86850 RBC ANTIBODY SCREEN: CPT | Performed by: THORACIC SURGERY (CARDIOTHORACIC VASCULAR SURGERY)

## 2022-07-02 PROCEDURE — 250N000012 HC RX MED GY IP 250 OP 636 PS 637: Performed by: STUDENT IN AN ORGANIZED HEALTH CARE EDUCATION/TRAINING PROGRAM

## 2022-07-02 PROCEDURE — 250N000011 HC RX IP 250 OP 636: Performed by: STUDENT IN AN ORGANIZED HEALTH CARE EDUCATION/TRAINING PROGRAM

## 2022-07-02 PROCEDURE — 93931 UPPER EXTREMITY STUDY: CPT | Mod: LT

## 2022-07-02 PROCEDURE — C1757 CATH, THROMBECTOMY/EMBOLECT: HCPCS | Performed by: SURGERY

## 2022-07-02 PROCEDURE — 250N000012 HC RX MED GY IP 250 OP 636 PS 637: Performed by: SURGERY

## 2022-07-02 PROCEDURE — 250N000013 HC RX MED GY IP 250 OP 250 PS 637: Performed by: THORACIC SURGERY (CARDIOTHORACIC VASCULAR SURGERY)

## 2022-07-02 PROCEDURE — 360N000077 HC SURGERY LEVEL 4, PER MIN: Performed by: SURGERY

## 2022-07-02 PROCEDURE — 258N000003 HC RX IP 258 OP 636: Performed by: STUDENT IN AN ORGANIZED HEALTH CARE EDUCATION/TRAINING PROGRAM

## 2022-07-02 PROCEDURE — 84100 ASSAY OF PHOSPHORUS: CPT | Performed by: STUDENT IN AN ORGANIZED HEALTH CARE EDUCATION/TRAINING PROGRAM

## 2022-07-02 PROCEDURE — 34111 REMOVAL OF ARM ARTERY CLOT: CPT | Mod: LT | Performed by: SURGERY

## 2022-07-02 PROCEDURE — 250N000013 HC RX MED GY IP 250 OP 250 PS 637: Performed by: SURGERY

## 2022-07-02 PROCEDURE — 80197 ASSAY OF TACROLIMUS: CPT | Performed by: INTERNAL MEDICINE

## 2022-07-02 PROCEDURE — 250N000009 HC RX 250: Performed by: THORACIC SURGERY (CARDIOTHORACIC VASCULAR SURGERY)

## 2022-07-02 PROCEDURE — 250N000011 HC RX IP 250 OP 636: Performed by: SURGERY

## 2022-07-02 PROCEDURE — 85027 COMPLETE CBC AUTOMATED: CPT | Performed by: THORACIC SURGERY (CARDIOTHORACIC VASCULAR SURGERY)

## 2022-07-02 PROCEDURE — 84100 ASSAY OF PHOSPHORUS: CPT | Performed by: INTERNAL MEDICINE

## 2022-07-02 PROCEDURE — 82805 BLOOD GASES W/O2 SATURATION: CPT | Performed by: THORACIC SURGERY (CARDIOTHORACIC VASCULAR SURGERY)

## 2022-07-02 PROCEDURE — 250N000013 HC RX MED GY IP 250 OP 250 PS 637: Performed by: STUDENT IN AN ORGANIZED HEALTH CARE EDUCATION/TRAINING PROGRAM

## 2022-07-02 PROCEDURE — 250N000009 HC RX 250: Performed by: SURGERY

## 2022-07-02 PROCEDURE — 94668 MNPJ CHEST WALL SBSQ: CPT

## 2022-07-02 PROCEDURE — 71045 X-RAY EXAM CHEST 1 VIEW: CPT | Mod: 26 | Performed by: RADIOLOGY

## 2022-07-02 PROCEDURE — 250N000013 HC RX MED GY IP 250 OP 250 PS 637: Performed by: PHYSICIAN ASSISTANT

## 2022-07-02 PROCEDURE — 99233 SBSQ HOSP IP/OBS HIGH 50: CPT | Mod: 24 | Performed by: INTERNAL MEDICINE

## 2022-07-02 PROCEDURE — 82803 BLOOD GASES ANY COMBINATION: CPT | Performed by: STUDENT IN AN ORGANIZED HEALTH CARE EDUCATION/TRAINING PROGRAM

## 2022-07-02 PROCEDURE — 250N000009 HC RX 250

## 2022-07-02 PROCEDURE — 83605 ASSAY OF LACTIC ACID: CPT

## 2022-07-02 PROCEDURE — 03CC3ZZ EXTIRPATION OF MATTER FROM LEFT RADIAL ARTERY, PERCUTANEOUS APPROACH: ICD-10-PCS | Performed by: SURGERY

## 2022-07-02 PROCEDURE — 83735 ASSAY OF MAGNESIUM: CPT | Performed by: INTERNAL MEDICINE

## 2022-07-02 PROCEDURE — 250N000011 HC RX IP 250 OP 636: Performed by: PHYSICIAN ASSISTANT

## 2022-07-02 PROCEDURE — 85520 HEPARIN ASSAY: CPT | Performed by: STUDENT IN AN ORGANIZED HEALTH CARE EDUCATION/TRAINING PROGRAM

## 2022-07-02 PROCEDURE — 250N000011 HC RX IP 250 OP 636: Performed by: THORACIC SURGERY (CARDIOTHORACIC VASCULAR SURGERY)

## 2022-07-02 PROCEDURE — 250N000009 HC RX 250: Performed by: STUDENT IN AN ORGANIZED HEALTH CARE EDUCATION/TRAINING PROGRAM

## 2022-07-02 PROCEDURE — 999N000157 HC STATISTIC RCP TIME EA 10 MIN

## 2022-07-02 PROCEDURE — 83735 ASSAY OF MAGNESIUM: CPT | Performed by: STUDENT IN AN ORGANIZED HEALTH CARE EDUCATION/TRAINING PROGRAM

## 2022-07-02 PROCEDURE — 258N000003 HC RX IP 258 OP 636

## 2022-07-02 PROCEDURE — 258N000003 HC RX IP 258 OP 636: Performed by: ANESTHESIOLOGY

## 2022-07-02 PROCEDURE — 94640 AIRWAY INHALATION TREATMENT: CPT | Mod: 76

## 2022-07-02 PROCEDURE — 250N000012 HC RX MED GY IP 250 OP 636 PS 637: Performed by: PHYSICIAN ASSISTANT

## 2022-07-02 PROCEDURE — 94640 AIRWAY INHALATION TREATMENT: CPT

## 2022-07-02 PROCEDURE — 258N000003 HC RX IP 258 OP 636: Performed by: SURGERY

## 2022-07-02 PROCEDURE — 85025 COMPLETE CBC W/AUTO DIFF WBC: CPT | Performed by: STUDENT IN AN ORGANIZED HEALTH CARE EDUCATION/TRAINING PROGRAM

## 2022-07-02 PROCEDURE — 250N000013 HC RX MED GY IP 250 OP 250 PS 637: Performed by: INTERNAL MEDICINE

## 2022-07-02 PROCEDURE — 94660 CPAP INITIATION&MGMT: CPT

## 2022-07-02 PROCEDURE — 272N000001 HC OR GENERAL SUPPLY STERILE: Performed by: SURGERY

## 2022-07-02 PROCEDURE — 370N000017 HC ANESTHESIA TECHNICAL FEE, PER MIN: Performed by: SURGERY

## 2022-07-02 PROCEDURE — 80048 BASIC METABOLIC PNL TOTAL CA: CPT | Performed by: STUDENT IN AN ORGANIZED HEALTH CARE EDUCATION/TRAINING PROGRAM

## 2022-07-02 PROCEDURE — 250N000011 HC RX IP 250 OP 636

## 2022-07-02 PROCEDURE — 250N000012 HC RX MED GY IP 250 OP 636 PS 637: Performed by: INTERNAL MEDICINE

## 2022-07-02 RX ORDER — HYDROXYZINE HYDROCHLORIDE 25 MG/1
25 TABLET, FILM COATED ORAL EVERY 6 HOURS PRN
Status: DISCONTINUED | OUTPATIENT
Start: 2022-07-02 | End: 2022-08-15

## 2022-07-02 RX ORDER — PROPOFOL 10 MG/ML
INJECTION, EMULSION INTRAVENOUS CONTINUOUS PRN
Status: DISCONTINUED | OUTPATIENT
Start: 2022-07-02 | End: 2022-07-02

## 2022-07-02 RX ORDER — SODIUM CHLORIDE, SODIUM LACTATE, POTASSIUM CHLORIDE, CALCIUM CHLORIDE 600; 310; 30; 20 MG/100ML; MG/100ML; MG/100ML; MG/100ML
INJECTION, SOLUTION INTRAVENOUS CONTINUOUS PRN
Status: DISCONTINUED | OUTPATIENT
Start: 2022-07-02 | End: 2022-07-02

## 2022-07-02 RX ORDER — CEFTAZIDIME 2 G/1
2 INJECTION, POWDER, FOR SOLUTION INTRAVENOUS EVERY 12 HOURS
Status: DISCONTINUED | OUTPATIENT
Start: 2022-07-02 | End: 2022-07-04

## 2022-07-02 RX ORDER — CEFAZOLIN SODIUM 1 G/3ML
INJECTION, POWDER, FOR SOLUTION INTRAMUSCULAR; INTRAVENOUS PRN
Status: DISCONTINUED | OUTPATIENT
Start: 2022-07-02 | End: 2022-07-02

## 2022-07-02 RX ORDER — LIDOCAINE HYDROCHLORIDE 10 MG/ML
INJECTION, SOLUTION EPIDURAL; INFILTRATION; INTRACAUDAL; PERINEURAL PRN
Status: DISCONTINUED | OUTPATIENT
Start: 2022-07-02 | End: 2022-07-02 | Stop reason: HOSPADM

## 2022-07-02 RX ORDER — KETAMINE HYDROCHLORIDE 10 MG/ML
INJECTION INTRAMUSCULAR; INTRAVENOUS PRN
Status: DISCONTINUED | OUTPATIENT
Start: 2022-07-02 | End: 2022-07-02

## 2022-07-02 RX ORDER — HEPARIN SODIUM 10000 [USP'U]/100ML
0-5000 INJECTION, SOLUTION INTRAVENOUS CONTINUOUS
Status: DISCONTINUED | OUTPATIENT
Start: 2022-07-02 | End: 2022-07-02

## 2022-07-02 RX ORDER — HYDROXYZINE HYDROCHLORIDE 50 MG/1
50 TABLET, FILM COATED ORAL EVERY 6 HOURS PRN
Status: DISCONTINUED | OUTPATIENT
Start: 2022-07-02 | End: 2022-08-15

## 2022-07-02 RX ORDER — HEPARIN SOD,PORCINE/0.9 % NACL 5K/1000 ML
INTRAVENOUS SOLUTION INTRAVENOUS PRN
Status: DISCONTINUED | OUTPATIENT
Start: 2022-07-02 | End: 2022-07-02 | Stop reason: HOSPADM

## 2022-07-02 RX ORDER — HEPARIN SODIUM 10000 [USP'U]/100ML
0-5000 INJECTION, SOLUTION INTRAVENOUS CONTINUOUS
Status: DISPENSED | OUTPATIENT
Start: 2022-07-02 | End: 2022-07-03

## 2022-07-02 RX ADMIN — Medication 0.03 MCG/KG/MIN: at 01:12

## 2022-07-02 RX ADMIN — MYCOPHENOLATE MOFETIL 1500 MG: 250 CAPSULE ORAL at 19:29

## 2022-07-02 RX ADMIN — LEVALBUTEROL HYDROCHLORIDE 1.25 MG: 1.25 SOLUTION RESPIRATORY (INHALATION) at 20:34

## 2022-07-02 RX ADMIN — SODIUM CHLORIDE, POTASSIUM CHLORIDE, SODIUM LACTATE AND CALCIUM CHLORIDE: 600; 310; 30; 20 INJECTION, SOLUTION INTRAVENOUS at 12:36

## 2022-07-02 RX ADMIN — NYSTATIN 1000000 UNITS: 100000 SUSPENSION ORAL at 07:51

## 2022-07-02 RX ADMIN — OXYCODONE HYDROCHLORIDE 5 MG: 5 TABLET ORAL at 21:11

## 2022-07-02 RX ADMIN — NYSTATIN 1000000 UNITS: 100000 SUSPENSION ORAL at 19:29

## 2022-07-02 RX ADMIN — Medication 20 MG: at 12:48

## 2022-07-02 RX ADMIN — GABAPENTIN 100 MG: 100 CAPSULE ORAL at 19:30

## 2022-07-02 RX ADMIN — HEPARIN SODIUM AND DEXTROSE 1350 UNITS/HR: 10000; 5 INJECTION INTRAVENOUS at 11:47

## 2022-07-02 RX ADMIN — CEFTAZIDIME 2 G: 2 INJECTION, POWDER, FOR SOLUTION INTRAVENOUS at 07:51

## 2022-07-02 RX ADMIN — ACETAMINOPHEN 975 MG: 325 TABLET, FILM COATED ORAL at 16:51

## 2022-07-02 RX ADMIN — ACETAMINOPHEN 975 MG: 325 TABLET, FILM COATED ORAL at 21:11

## 2022-07-02 RX ADMIN — SULFAMETHOXAZOLE AND TRIMETHOPRIM 2 TABLET: 800; 160 TABLET ORAL at 07:51

## 2022-07-02 RX ADMIN — OXYCODONE HYDROCHLORIDE 5 MG: 5 TABLET ORAL at 10:37

## 2022-07-02 RX ADMIN — SENNOSIDES AND DOCUSATE SODIUM 2 TABLET: 8.6; 5 TABLET ORAL at 19:29

## 2022-07-02 RX ADMIN — SENNOSIDES AND DOCUSATE SODIUM 2 TABLET: 8.6; 5 TABLET ORAL at 07:51

## 2022-07-02 RX ADMIN — OXYCODONE HYDROCHLORIDE 5 MG: 5 TABLET ORAL at 01:52

## 2022-07-02 RX ADMIN — PROPOFOL 50 MCG/KG/MIN: 10 INJECTION, EMULSION INTRAVENOUS at 12:52

## 2022-07-02 RX ADMIN — ACETAMINOPHEN 975 MG: 325 TABLET, FILM COATED ORAL at 05:55

## 2022-07-02 RX ADMIN — SODIUM CHLORIDE 500 ML: 9 INJECTION, SOLUTION INTRAVENOUS at 01:15

## 2022-07-02 RX ADMIN — CEFAZOLIN 1 G: 1 INJECTION, POWDER, FOR SOLUTION INTRAMUSCULAR; INTRAVENOUS at 12:37

## 2022-07-02 RX ADMIN — Medication 20 MG: at 12:53

## 2022-07-02 RX ADMIN — OXYCODONE HYDROCHLORIDE 5 MG: 5 TABLET ORAL at 16:52

## 2022-07-02 RX ADMIN — MULTIVITAMIN 15 ML: LIQUID ORAL at 07:51

## 2022-07-02 RX ADMIN — HYDROXYZINE HYDROCHLORIDE 50 MG: 25 TABLET ORAL at 19:29

## 2022-07-02 RX ADMIN — Medication 0.5 UNITS: at 13:16

## 2022-07-02 RX ADMIN — HYDROXYZINE HYDROCHLORIDE 50 MG: 25 TABLET ORAL at 10:37

## 2022-07-02 RX ADMIN — PREDNISOLONE 17.5 MG: 15 SOLUTION ORAL at 07:54

## 2022-07-02 RX ADMIN — CEFTAZIDIME 2 G: 2 INJECTION, POWDER, FOR SOLUTION INTRAVENOUS at 20:48

## 2022-07-02 RX ADMIN — GABAPENTIN 100 MG: 100 CAPSULE ORAL at 07:51

## 2022-07-02 RX ADMIN — TACROLIMUS 2 MG: 1 CAPSULE ORAL at 18:24

## 2022-07-02 RX ADMIN — ACETYLCYSTEINE 2 ML: 200 SOLUTION ORAL; RESPIRATORY (INHALATION) at 20:34

## 2022-07-02 RX ADMIN — LEVALBUTEROL HYDROCHLORIDE 1.25 MG: 1.25 SOLUTION RESPIRATORY (INHALATION) at 08:28

## 2022-07-02 RX ADMIN — NYSTATIN 1000000 UNITS: 100000 SUSPENSION ORAL at 15:50

## 2022-07-02 RX ADMIN — OXYCODONE HYDROCHLORIDE 5 MG: 5 TABLET ORAL at 05:55

## 2022-07-02 RX ADMIN — POLYETHYLENE GLYCOL 3350 17 G: 17 POWDER, FOR SOLUTION ORAL at 19:29

## 2022-07-02 RX ADMIN — HEPARIN SODIUM 5000 UNITS: 5000 INJECTION, SOLUTION INTRAVENOUS; SUBCUTANEOUS at 05:55

## 2022-07-02 RX ADMIN — CEFTAZIDIME 2 G: 2 INJECTION, POWDER, FOR SOLUTION INTRAVENOUS at 00:59

## 2022-07-02 RX ADMIN — INSULIN ASPART 5 UNITS: 100 INJECTION, SOLUTION INTRAVENOUS; SUBCUTANEOUS at 11:22

## 2022-07-02 RX ADMIN — Medication 40 MG: at 07:54

## 2022-07-02 RX ADMIN — POLYETHYLENE GLYCOL 3350 17 G: 17 POWDER, FOR SOLUTION ORAL at 07:51

## 2022-07-02 RX ADMIN — MYCOPHENOLATE MOFETIL 1500 MG: 200 POWDER, FOR SUSPENSION ORAL at 07:54

## 2022-07-02 RX ADMIN — Medication 1 PACKET: at 07:56

## 2022-07-02 RX ADMIN — TACROLIMUS 2 MG: 1 CAPSULE ORAL at 07:54

## 2022-07-02 RX ADMIN — LEVALBUTEROL HYDROCHLORIDE 1.25 MG: 1.25 SOLUTION RESPIRATORY (INHALATION) at 17:11

## 2022-07-02 RX ADMIN — GABAPENTIN 100 MG: 100 CAPSULE ORAL at 16:52

## 2022-07-02 RX ADMIN — ACETYLCYSTEINE 2 ML: 200 SOLUTION ORAL; RESPIRATORY (INHALATION) at 08:28

## 2022-07-02 RX ADMIN — INSULIN ASPART 1 UNITS: 100 INJECTION, SOLUTION INTRAVENOUS; SUBCUTANEOUS at 20:46

## 2022-07-02 RX ADMIN — ACETYLCYSTEINE 2 ML: 200 SOLUTION ORAL; RESPIRATORY (INHALATION) at 17:12

## 2022-07-02 RX ADMIN — SODIUM CHLORIDE, POTASSIUM CHLORIDE, SODIUM LACTATE AND CALCIUM CHLORIDE 500 ML: 600; 310; 30; 20 INJECTION, SOLUTION INTRAVENOUS at 10:45

## 2022-07-02 RX ADMIN — PREDNISOLONE 17.5 MG: 15 SOLUTION ORAL at 19:30

## 2022-07-02 RX ADMIN — SODIUM CHLORIDE 20 MG: 9 INJECTION, SOLUTION INTRAVENOUS at 12:55

## 2022-07-02 ASSESSMENT — ACTIVITIES OF DAILY LIVING (ADL)
ADLS_ACUITY_SCORE: 30
ADLS_ACUITY_SCORE: 34
ADLS_ACUITY_SCORE: 30
ADLS_ACUITY_SCORE: 34
ADLS_ACUITY_SCORE: 30
ADLS_ACUITY_SCORE: 34

## 2022-07-02 ASSESSMENT — LIFESTYLE VARIABLES: TOBACCO_USE: 1

## 2022-07-02 ASSESSMENT — COPD QUESTIONNAIRES
CAT_SEVERITY: SEVERE
COPD: 1

## 2022-07-02 NOTE — CONSULTS
Vascular Surgery Consultation    Sofie Rodriguez  MRN#: [id number]    Date of Admission:  6/28/2022    Date of Consult: 7/2/2022    Reason for consult: L distal upper extremity weakness with increasing parasthesia   Requesting service: CVICU - Dr. 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 Sunshine, with recent pressors and L radial arterial line with 1 day of increasing left digital paraesthesia and duskiness, now with increasing numbness with stat LUE US imaging showing no flow to radial and ulnar arteries distal to wrist      Plan:   Monitor LUE with CMS / neuro / vascular checks  Initiate therapeutic anticoagulation as tolerated STAT; remove catheters to start  Wean pressors as tolerated  Wrapping LUE with Ashley Hugger  Add on for operative intervention for thrombectomy left hand - radial artery, possible ulnar artery    Discussed with Vascular fellow Dr. Jackson and Vascular staff Dr. Santos Garnica MD  Surgery, PGY-3  Department of Surgery          Chief Complaint:   LUE paresthesia with numbness         History of Present Illness:   Sfoie Rodriguez is a 62 year old female with PMH of oxygen-dependent COPD, HFpEF, HTN, HCV, and osteopenia who underwent bilateral lung transplant on 6/28/22 with Dr. Sunshine, with recent pressors and L radial arterial line with 1 day of increasing left digital paraesthesia and duskiness, with LUE US imaging pending for which Vascular Surgery is consulted    Patient noted increasing LUE distal paresthesia and weakness yesterday w L radial art line in place. Yesterday the L radial arterial line was removed 7/1 with continued patient complaint of tingling and weakness in her left arm, with paresthesia worsening today. Nods when asked left hand numbness and tingling present, pointing to 1st to 4th fingers. Able to move  "distal fingers but unable to feel down to the proximal phalanges.    Today, on BIPAP with pressors - Levo 0.04 - to maintain MAP above 65mm Hg. On Heparin TID. On Ceftazidime with immunosuppression in place. Decreasing UOP. BP 92/46   Pulse 78   Temp 97.5  F (36.4  C) (Axillary)   Resp 22   Ht 1.575 m (5' 2\")   Wt 75.2 kg (165 lb 12.6 oz)   SpO2 100%   BMI 30.32 kg/m  .  Lactate 0.5. VBG 7.20 / 81 / 39 / 32 / 1.9. Cr. 1.43. WBC 14.1. Hgb 9.7. Stat U/S showing no flow to left radial and ulnar         Past Medical History:     Past Medical History:   Diagnosis Date     CHF (congestive heart failure) (H)      COPD (chronic obstructive pulmonary disease) (H)      Hepatitis 2017    Hep C, [facility]     HTN (hypertension)      Osteopenia              Past Surgical History:     Past Surgical History:   Procedure Laterality Date     COLONOSCOPY       CV CORONARY ANGIOGRAM N/A 2021    Procedure: CV CORONARY ANGIOGRAM;  Surgeon: Alexander Cuellar MD;  Location:  [facility]     CV RIGHT HEART CATH MEASUREMENTS RECORDED N/A 2021    Procedure: CV RIGHT HEART CATH;  Surgeon: Alexander Cuellar MD;  Location:  [facility]ATH LAB     ENT SURGERY  1974    tonsillectomy     HAND SURGERY       LEEP TX, CERVICAL  2017    HECTOR III     LYMPH NODE BIOPSY Left     Left axilla, benign- [place]     TRANSPLANT LUNG RECIPIENT SINGLE X2 Bilateral 2022    Procedure: Clamshell Incision, Bilateral Sequential Lung Transplant, On Cardiopulmonary Bypass, Flexible Bronchoscopy;  Surgeon: Sue Sunshine MD;  Location:  OR             Social History:   Tobacco: Former smoker, quit   EtOH: None  Lives in [place], [place]    Social History     Tobacco Use     Smoking status: Former Smoker     Years: 30.00     Types: Cigarettes     Quit date: 2020     Years since quittin.6     Smokeless tobacco: Never Used   Substance Use Topics     Alcohol use: Not Currently             Family " History:     Negative for bleeding disorders, clotting disorders, or problems with anesthesia.    History reviewed. No pertinent family history.             Allergies:   No Known Allergies          Medications:     No current facility-administered medications on file prior to encounter.  albuterol (PROAIR HFA/PROVENTIL HFA/VENTOLIN HFA) 108 (90 BASE) MCG/ACT Inhaler, Inhale 2 puffs into the lungs every 6 hours as needed for shortness of breath / dyspnea or wheezing  aspirin (ASA) 81 MG chewable tablet, Take 81 mg by mouth daily  CALCIUM-VITAMIN D PO, Take 1 tablet by mouth daily  fluticasone-vilanterol (BREO ELLIPTA) 100-25 MCG/INH inhaler, Inhale 1 puff into the lungs daily 25 mcg  furosemide (LASIX) 20 MG tablet, Take 1 tablet (20 mg) by mouth daily  ipratropium - albuterol 0.5 mg/2.5 mg/3 mL (DUONEB) 0.5-2.5 (3) MG/3ML neb solution, Inhale 1 vial into the lungs every 4 hours as needed for shortness of breath / dyspnea  Multiple Vitamin (QUINTABS) TABS, Take 1 tablet by mouth daily  umeclidinium (INCRUSE ELLIPTA) 62.5 MCG/INH inhaler, Inhale 1 puff into the lungs daily 62.5mcg              Review of Systems:   10-point ROS otherwise negative except as noted above.          Physical Exam:     Temp:  [97.5  F (36.4  C)-99.5  F (37.5  C)] 97.5  F (36.4  C)  Pulse:  [] 78  Resp:  [10-28] 22  BP: ()/(41-84) 92/46  FiO2 (%):  [30 %] 30 %  SpO2:  [92 %-100 %] 100 %     General: AAOx4, NAD, lying comfortably in bed  CV: regular rate and rhythm, warm, well-perfused  Pulm: no dyspnea, breathing comfortably on BIPAP  Abd: soft, non-distended, non-tender  Extremities: no edema, extremities cool to touch bilaterally UE and LE. LUE with intact motor sensation able to flex / extend wrist without difficult, able to move all 5 digits, abduct and adduct thumb with ROM, cool to touch with minimal discoloration at distal phalangeas, sensation diminished in 1-3 digits to PPs. No mottling noted, cap refill intact; RUE with  intact motor and sensation. Diminished R ulnar signal with very diminished R radial signal noted, minimal signal in palmar arch. Palpable distal LE pulses.  Neuro: moving all extremities spontaneously without apparent deficit    I/O last 3 completed shifts:  In: 1960.89 [I.V.:490.89; NG/GT:360; IV Piggyback:1000]  Out: 2511 [Urine:491; Emesis/NG output:700; Chest Tube:1320]          Data:   Labs:  Arterial Blood Gases   Recent Labs   Lab 07/02/22  0241 06/30/22  1037 06/30/22  0356 06/29/22  2339   PH 7.19* 7.37 7.54* 7.48*   PCO2 77* 51* 34* 40   PO2 93 133* 184* 162*   HCO3 29* 29* 29* 29*        Complete Blood Count   Recent Labs   Lab 07/02/22  0440 07/01/22  0409 06/30/22  0355 06/29/22  2341   WBC 14.1* 12.0* 22.2* 21.5*   HGB 9.7* 10.1* 9.8* 10.2*   * 73* 91* 98*       Basic Metabolic Panel  Recent Labs   Lab 07/02/22  0804 07/02/22  0626 07/02/22  0448 07/02/22  0440 07/01/22  0821 07/01/22  0409 06/30/22  0401 06/30/22  0355 06/29/22  2351 06/29/22  2341   NA  --   --   --  143  --  142  --  141  --  140   POTASSIUM  --   --   --  4.7  --  4.5  --  4.0  --  4.1   CHLORIDE  --   --   --  106  --  107  --  107  --  105   CO2  --   --   --  28  --  29  --  26  --  26   BUN  --   --   --  39.7*  --  28.1*  --  19.6  --  20.6   CR  --   --   --  1.43*  --  1.01*  --  0.98*  --  1.00*   * 158* 154* 164*   < > 92   < > 115*   < > 190*   ESTUARDO  --   --   --  8.1*  --  8.0*  --  7.8*  --  7.9*   MAG  --   --   --  2.8*  --  2.7*  --  2.9*  --  2.0   PHOS  --   --   --  4.8*  --  4.9*  --  3.8  --  3.9    < > = values in this interval not displayed.       Liver Function Tests  Recent Labs   Lab 06/30/22  0355 06/29/22  2341 06/29/22  0248 06/28/22  1258   AST 65* 78* 143* 30   ALT 23 27 33 16   ALKPHOS 35 40 36 86   BILITOTAL 0.3 0.3 0.9 0.4   ALBUMIN 2.6* 2.8* 2.3* 5.3*       Pancreatic Enzymes  No lab results found in last 7 days.    Coagulation Profile  Recent Labs   Lab 06/29/22  0248 06/29/22  0007  06/28/22  1258   INR 1.33* 1.60* 0.91   PTT 52* 32 29       Lactate  Recent Labs   Lab 07/02/22  0758 07/02/22  0440 07/02/22  0054 07/01/22  2216   LACT 0.5* 0.5* 0.4* 0.4*       Imaging:   Results for orders placed or performed during the hospital encounter of 06/28/22   XR Chest 2 Views    Narrative    Exam: XR CHEST 2 VW, 6/28/2022 2:09 PM    Comparison: CT chest 4/4/2022, chest x-ray 6/14/2021    History: pre-op lung transplant    Findings:  PA and lateral views of the chest.  Trachea is midline. The heart size  is within normal limits. Atherosclerotic ectasia of the aortic arch.  Hyperinflated lungs with emphysematous changes. No acute airspace  disease. There is no pneumothorax or pleural effusion. The upper  abdomen is unremarkable. No acute osseous abnormality. Flattened  hemidiaphragms on the lateral view.      Impression    Impression:   Hyperinflated lungs with emphysematous changes. No acute airspace  disease.     I have personally reviewed the examination and initial interpretation  and I agree with the findings.    ALVINA HARRY MD         SYSTEM ID:  [id number]   XR Chest Port 1 View    Narrative    XR CHEST PORT 1 VIEW  6/29/2022 3:23 AM      HISTORY: s/p bilateral lung transplant    COMPARISON: 6/28/2022    FINDINGS:   Single AP view of the chest. New postoperative changes of bilateral  lung transplantation. Endotracheal tube tip overlying the mid thoracic  trachea. Right IJ [place]-Dexter catheter tip overlying the main pulmonary  artery. Biapical and bibasilar chest tubes. Pericardial drain. Enteric  tube courses beyond the field-of-view. Trachea is midline. Cardiac  silhouette is within normal limits. No pneumothorax or significant  pleural effusion. Perihilar and bibasilar streaky opacities.  Subcutaneous emphysema along the lateral chest walls.      Impression    IMPRESSION:   1. Postoperative chest with mild perihilar and bibasilar atelectasis.  2. Endotracheal tube tip approximately 6 cm  above the nabil.  3. Right IJ [place]-Dexter catheter tip in the main pulmonary artery.    I have personally reviewed the examination and initial interpretation  and I agree with the findings.    CECI REGAN DO         SYSTEM ID:  [id number]   POC US Guidance Needle Placement    Impression    Bilateral CLARI catheters   XR Abdomen Port 1 View    Narrative    EXAMINATION:  XR ABDOMEN PORT 1 VIEWS 6/29/2022 12:27 PM     COMPARISON: PET 7/14/2021.    HISTORY: Verify small bowel feeding tube bedside placement.    FINDINGS: Frontal view of the abdomen. Feeding tube tip likely  projects over distended distal stomach. Bibasilar chest tubes.  [place]-Dexter catheter tip projects over the main pulmonary artery. No  abnormally dilated loops of bowel. Postsurgical changes of bilateral  lung transplantation with clamshell sternotomy wires.       Impression    IMPRESSION: Feeding tube tip overlying the central abdomen likely  projecting over distended distal stomach.    I have personally reviewed the examination and initial interpretation  and I agree with the findings.    NIKOS JAVED MD         SYSTEM ID:  [id number]   XR Chest Port 1 View    Narrative    EXAM: XR CHEST PORT 1 VIEW  6/29/2022 5:22 PM     HISTORY:  lung txp       COMPARISON:  Radiograph earlier same day, 6/20/2022.    TECHNIQUE: AP view of the chest at 60 degrees    FINDINGS:   Devices, lines, tubes: Endotracheal tube, bilateral chest tubes,  pericardial drain, and right internal jugular [place]-Dexter catheter are  in similar positioning. Interval placement of a bulge is a catheters.  Feeding tube tip coursing inferior to the left hemidiaphragm with tip  out of the field of view. Clamshell sternotomy wires are intact.    Postoperative changes of bilateral lung transplantation. The trachea  is midline. The cardiomediastinal silhouette is within normal limits.  Similar perihilar and bibasilar streaky opacities..  No appreciable  pneumothorax, pleural effusion, or focal  consolidative opacity. No  acute osseous abnormality.  Decreased amount of percutaneous emphysema  projecting over the lateral chest walls.      Impression    IMPRESSION:   1. Similar perihilar and bibasilar streaky opacities, favoring  postoperative atelectasis and/or edema.  2. No appreciable pneumothoraces with bilateral chest tubes in place.    I have personally reviewed the examination and initial interpretation  and I agree with the findings.    MONSERRAT SEGAL MD         SYSTEM ID:  [id number]   XR Chest Port 1 View    Narrative    XR CHEST PORT 1 VIEW  6/30/2022 1:05 AM      HISTORY: Post-Op Lung    COMPARISON: 6/29/2022    FINDINGS:   Single AP view of the chest. Postoperative changes of bilateral lung  transplantation. Feeding tube courses beyond the field-of-view.  Endotracheal tube tip overlying the mid thoracic trachea. Right IJ  [place]-Dexter catheter tip over the proximal right pulmonary artery.  Bilateral chest tubes and mediastinal drain in similar position.  Stable mediastinum. No pneumothorax or significant right pleural  effusion. Small left pleural effusion. Largely unchanged perihilar and  left basilar streaky opacities.      Impression    IMPRESSION:   1. Stable support devices.  2. Largely unchanged perihilar and left basilar opacities with small  left pleural effusion.     I have personally reviewed the examination and initial interpretation  and I agree with the findings.    YANNICK BENAVIDEZ MD         SYSTEM ID:  [id number]   XR Abdomen Port 1 View    Narrative    XR ABDOMEN PORT 1 VIEWS  6/30/2022 1:10 AM      HISTORY: Verify small bowel feeding tube bedside placement    COMPARISON: 6/29/2022    FINDINGS:   Single supine view of the chest and abdomen. Refer to same day chest  radiograph report for findings of the visualized chest. Feeding tube  tip over the distal distended stomach. Nonobstructive bowel gas  pattern. No definite pneumatosis or portal venous gas.      Impression     IMPRESSION:   Feeding tube tip over the distal distended stomach. Recommend  repositioning if postpyloric is desired.    I have personally reviewed the examination and initial interpretation  and I agree with the findings.    YANNICK BENAVIDEZ MD         SYSTEM ID:  [id number]   XR Feeding Tube Placement    Narrative    Feeding tube placement.    Comparison:  none.    History: Repositioning of feeding tube (needs postpyloric and lung  transplant).    Fluoroscopy time:  418.9 seconds    Technique: After injection of Xylocaine gel into the right nostril, a  feeding tube was advanced under fluoroscopic guidance.    Findings: The previously placed feeding tube was identified in the  gastric body. Multiple attempts were made to advance the feeding tube  beyond the pylorus however were ultimately unsuccessful. Approximately  1 L of gastric fluid was aspirated from the stomach. The feeding tube  was ultimately left in the gastric antrum. The feeding tube was  secured to the patient's primary care via bridle.      Impression    Impression: Unsuccessful feeding tube advancement. Feeding tube left  within the gastric antrum. Can consider follow up abdominal radiograph  on 7/2/2021 to evaluate positioning.    Dr. Harry, faculty physician, was present during the entire  procedure, including personally attempting tube placement.    I have personally reviewed the examination and initial interpretation  and I agree with the findings.    ALVINA HARRY MD         SYSTEM ID:  [id number]   XR Abdomen Port 1 View    Narrative    EXAMINATION:  XR ABDOMEN PORT 1 VIEWS 6/30/2022 4:34 PM     COMPARISON: Earlier same day radiograph.    HISTORY: assess FT location.    TECHNIQUE: Frontal view of the abdomen.    FINDINGS: Enteric tube tip projects over the left hemipelvis.  Prominent gaseous distention of the stomach. Partially visualized  chest tubes in the thorax. Please see chest radiograph. Dong catheter  in the pelvis.       Impression    IMPRESSION: Enteric tube tip projects over the distal distended  stomach, likely near the pylorus versus proximal duodenum. Could  consider further advancement with repeat radiograph to reevaluate.    I have personally reviewed the examination and initial interpretation  and I agree with the findings.    YANNICK BENAVIDEZ MD         SYSTEM ID:  [id number]   XR Chest Port 1 View    Narrative    Chest radiograph  7/1/2022 1:42 AM      HISTORY: Postop lung    COMPARISON: 6/30/2022    FINDINGS:   Single AP view of the chest. Postoperative changes of bilateral lung  transplantation. Feeding tube courses beyond the field of view. Right  IJ [place]-Dexter tip in the distal main/proximal right pulmonary artery.  Interval extubation. Stable bilateral chest tubes and mediastinal  drain. Stable mediastinum. No pneumothorax or significant right  pleural effusion. Small left pleural effusion. Increased perihilar and  bibasilar interstitial and airspace opacities.      Impression    IMPRESSION:     1. Interval extubation.  2. Right IJ [place]-Dexter tip in the distal main/proximal right pulmonary  artery. Stable remaining devices.  3. Increased perihilar and bibasilar opacities, favored atelectasis  and/or pulmonary edema  4. small left pleural effusion.    I have personally reviewed the examination and initial interpretation  and I agree with the findings.    NIKOS JAVED MD         SYSTEM ID:  [id number]   XR Fluoro Time 0/1 Hour    Narrative    This exam was marked as non-reportable because it will not be read by a   radiologist or a [facility] non-radiologist provider.         XR Chest Port 1 View    Narrative    XR CHEST PORT 1 VIEW  7/2/2022 2:25 AM      HISTORY: Post-Op Lung    COMPARISON: 7/1/2022    FINDINGS:   Single AP view of the chest. Postoperative change of bilateral lung  transplantation. Feeding tube courses beyond the field-of-view. Right  IJ central sheath tip in the high SVC with removal of  [place]-Dexter.  Bilateral chest tubes and mediastinal drain in similar position.  Stable mediastinum. Trace right apical pneumothorax. No appreciable  right pleural effusion. Small left pleural effusion. Similar perihilar  and bibasilar interstitial and airspace opacities. Enteric contrast in  the stomach.      Impression    IMPRESSION:   1. Interval removal of [place]-Dexter with right IJ sheath tip in the high  SVC. Stable remaining devices.  2. Stable perihilar and bibasilar opacities with small left pleural  effusion.  3. Trace right apical pneumothorax with chest tubes in place.    I have personally reviewed the examination and initial interpretation  and I agree with the findings.    CECI REGAN DO         SYSTEM ID:  [id number]               Fellow addendum    Patient seen and evaluated with Dr. Garnica. Ms. Rodriguez is a 63yo woman POD4 s/p bilateral lung transplant for COPD, now with a numb left hand. She had a left radial arterial line that was removed yesterday and has not had improvement in sensation since removal. She denies pain in the hand. She is able to move all of her fingers and wrist. On exam, she is resting in bed in mild distress due in part to anxiety, on bipap. Triphasic left brachial, radial, ulnar signals near the antecubital fossa. No Doppler signal at the wrist or in the hand. No sensation to light touch or pain in the hand. Fingers dusky. On 0.05 levo. Labs reviewed, persistent acidosis. BACILIO with creatinine 1.43 from 0.85 preop. Duplex ultrasound demonstrates elevated velocities up to the brachial and no flow at the wrist.   Discussed treatment options with Ms. Rodriguez including anticoagulation, surgical thrombectomy. Discussed the risks including bleeding, permament numbness/nerve damage, impaired motor function, and even limb loss. She understands and agrees to proceed with thrombectomy. Discussed with CT surgery fellow. Will wean norepi and anticoagulation okay.    Charisse Jackson,  MD  07/02/22  11:45 AM

## 2022-07-02 NOTE — ANESTHESIA POSTPROCEDURE EVALUATION
Patient: Sofie Rodriguez    Procedure: Procedure(s):  LEFT RADIAL ARM THROMBECTOMY       Anesthesia Type:  MAC    Note:  Disposition: ICU            ICU Sign Out: Anesthesiologist/ICU physician sign out WAS performed   Postop Pain Control: Uneventful            Sign Out: Well controlled pain   PONV: No   Neuro/Psych: Uneventful            Sign Out: Acceptable/Baseline neuro status   Airway/Respiratory: Uneventful            Sign Out: Acceptable/Baseline resp. status; O2 supplementation               Oxygen: BiPAP.   CV/Hemodynamics: Uneventful            Sign Out: Detailed CV status               Blood Pressure: Pressors; Normal               Rate/Rhythm: Normal HR               Perfusion:  Adequate perfusion indices   Other NRE: NONE   DID A NON-ROUTINE EVENT OCCUR? No    Event details/Postop Comments:  Patient taken to OR on BiPAP which was used throughout the case.  Returned to ICU on BiPAP at same settings            Last vitals:  Vitals:    07/02/22 1215 07/02/22 1405 07/02/22 1415   BP: (!) 84/47 94/54 92/50   Pulse: 97 96 96   Resp:  21 23   Temp:      SpO2: 98% 95% 93%       Electronically Signed By: Nathen Bob MD  July 2, 2022  2:53 PM

## 2022-07-02 NOTE — PLAN OF CARE
"Neuro: A&Ox4. Anxious and \"scared.\"  Drowsy postoperatively; received prop and ketamine during case.  She did report feeling more alert around 1800.  Cardiac: SR . MAP >65 with 0.08 levo.   Respiratory: Sating >92% on BiPAP, rate 24, 18/5, 30% Fi02.  Continues to have elevated CO2, last 72. Plan to wear BiPap overnight and reassess need..  GI/: Montgomery placed at 1100, unable to void since montgomery removed yesterday; ~20 mL/hr. Large BM per anesthesia, first since surgery.   Diet/appetite: NPO with TF's at goal of 45 mL/hr via NJ tube.   Activity:  Assist of 2, turned q2hrs.  Pain: At acceptable level on current regimen. Oxycodone given q4hrs, 5 mg.  ES catheters removed due to starting heparin.    Skin: No new deficits noted.  LDA's:  R MAC, PIV x2, NJ.    Plan: OR for L radial thrombectomy.  Currently has +1/doppler pulses to radial, doppler ulnar.  Hand cool to touch, <3 cap refill; reports tingling to digits but numbness has resolved.  Continue with POC. Notify primary team with changes.   "

## 2022-07-02 NOTE — ANESTHESIA PREPROCEDURE EVALUATION
Anesthesia Pre-Procedure Evaluation    Patient: Sofie Rodriguez   MRN: [id number] : 1960        Procedure : Procedure(s):  TRANSPLANT, LUNG, RECIPIENT, BILATERAL          Past Medical History:   Diagnosis Date     CHF (congestive heart failure) (H)      COPD (chronic obstructive pulmonary disease) (H)      Hepatitis 2017    Hep C, [facility]     HTN (hypertension)      Osteopenia       Past Surgical History:   Procedure Laterality Date     COLONOSCOPY       CV CORONARY ANGIOGRAM N/A 2021    Procedure: CV CORONARY ANGIOGRAM;  Surgeon: Alexander Cuellar MD;  Location:  HEART CARDIAC CATH LAB     CV RIGHT HEART CATH MEASUREMENTS RECORDED N/A 2021    Procedure: CV RIGHT HEART CATH;  Surgeon: Alexander Cuellar MD;  Location:  HEART CARDIAC CATH LAB     ENT SURGERY      tonsillectomy     HAND SURGERY       LEEP TX, CERVICAL  2017    HECTOR III     LYMPH NODE BIOPSY Left     Left axilla, benign- [place]     TRANSPLANT LUNG RECIPIENT SINGLE X2 Bilateral 2022    Procedure: Clamshell Incision, Bilateral Sequential Lung Transplant, On Cardiopulmonary Bypass, Flexible Bronchoscopy;  Surgeon: Sue Sunshine MD;  Location:  OR      No Known Allergies   Social History     Tobacco Use     Smoking status: Former Smoker     Years: 30.00     Types: Cigarettes     Quit date: 2020     Years since quittin.6     Smokeless tobacco: Never Used   Substance Use Topics     Alcohol use: Not Currently      Wt Readings from Last 1 Encounters:   22 75.2 kg (165 lb 12.6 oz)        Anesthesia Evaluation   Pt has had prior anesthetic. Type: General.    No history of anesthetic complications       ROS/MED HX  ENT/Pulmonary: Comment: End Stage COPD, Bronchiectasis, Hyperinflation    (+) tobacco use, Past use, severe,  COPD, O2 dependent,     Neurologic:       Cardiovascular:     (+) hypertension-----Previous cardiac testing   Echo: Date: 21  Results:  [id number]  [id number]  [id number]  [id number]^SACHIN^DARCY^PARTH     [facility],[place]  Echocardiography Laboratory  [address] [place] [id number]     Name: ALMAS CASIANO  MRN: [id number]  : 1960  Study Date: 2021 08:46 AM  Age: 61 yrs  Gender: Female  Patient Location: [facility]  Reason For Study: COPD (chronic obstructive pulmonary disease) (H), Encounter  for  Ordering Physician: DARCY STEPHENSON  Referring Physician: DARCY STEPHENSON  Performed By: Kateryna Concepcion RDCS     BSA: 1.6 m2  Height: 63 in  Weight: 135 lb  HR: 104  BP: 127/86 mmHg  ______________________________________________________________________________  Procedure  Bubble Echocardiogram with two-dimensional, color and spectral Doppler  performed.  ______________________________________________________________________________  Interpretation Summary  Global and regional left ventricular function is normal with an EF of 55-60%.  Mild concentric wall thickening consistent with left ventricular hypertrophy  is present.  Global right ventricular function is normal.  No significant valvular abnormalities.  Pulmonary artery systolic pressure cannot be assessed.  Negative bubble study.  There is no prior study for direct comparison.  ______________________________________________________________________________  Left Ventricle  Global and regional left ventricular function is normal with an EF of 55-60%.  Left ventricular size is normal. Mild concentric wall thickening consistent  with left ventricular hypertrophy is present. Diastolic function not assessed  due to tachycardia. No regional wall motion abnormalities are seen. Abnormal  non-specific septal motion is present.     Right Ventricle  The right ventricle is normal size. Global right ventricular function is  normal. Right ventricular wall thickness is normal.     Atria  Both atria appear normal. The atrial septum is intact as assessed by  color  Doppler and agitated saline bubble study .     Mitral Valve  The mitral valve is normal.     Aortic Valve  Aortic valve is normal in structure and function. The aortic valve is  tricuspid.     Tricuspid Valve  The tricuspid valve is normal. The peak velocity of the tricuspid regurgitant  jet is not obtainable. Trace tricuspid insufficiency is present. Pulmonary  artery systolic pressure cannot be assessed.     Pulmonic Valve  The pulmonic valve is normal.     Vessels  The aorta root is normal. The pulmonary artery cannot be assessed. IVC  diameter <2.1 cm collapsing >50% with sniff suggests a normal RA pressure of 3  mmHg.     Pericardium  No pericardial effusion is present.     Compared to Previous Study  There is no prior study for direct comparison.     Attestation  I have personally viewed the imaging and agree with the interpretation and  report as documented by the fellow, Greg Vargas, and/or edited by me.  ______________________________________________________________________________  MMode/2D Measurements & Calculations  IVSd: 1.2 cm  LVIDd: 4.1 cm  LVIDs: 3.0 cm  LVPWd: 1.1 cm  FS: 28.5 %  LV mass(C)d: 161.4 grams  LV mass(C)dI: 98.7 grams/m2  Ao root diam: 3.5 cm  asc Aorta Diam: 2.4 cm  LVOT diam: 2.0 cm  LVOT area: 3.1 cm2  LA Volume (BP): 36.2 ml     LA Volume Index (BP): 22.1 ml/m2  RWT: 0.51     Doppler Measurements & Calculations  MV E max evan: 87.8 cm/sec  MV dec slope: 693.0 cm/sec2  MV dec time: 0.13 sec  PA acc time: 0.07 sec  E/E' avg: 15.4  Lateral E/e': 14.7  Medial E/e': 16.1     Stress Test: Date: Results:    ECG Reviewed: Date: Results:    Cath: Date: Results:  RA 10/9/9  RV 46/10  PA 46/27 (35)  PCWP15  NIBP 151/91(105)  Pa sat 72.2%   Ao sat 99%  Tianna CO/Ci 3.6/2.2  TD CO/CI 5.8/3.6  PVR (per tianna) 5.5WU, per thermo 3.4WU Right sided filling pressures are normal. Left sided filling pressures are mildly elevated. Moderately elevated pulmonary artery hypertension. Normal cardiac  output level. (-) murmur   METS/Exercise Tolerance: 1 - Eating, dressing    Hematologic:       Musculoskeletal:       GI/Hepatic:     (+) hepatitis type C, liver disease,     Renal/Genitourinary:       Endo:       Psychiatric/Substance Use:     (+) alcohol abuse Recreational drug usage: Meth and Cannabis (Stopped in 2019).    Infectious Disease:       Malignancy:       Other:                          Physical Exam    Airway   unable to assess          Respiratory Devices and Support         Dental    unable to assess        Cardiovascular          Rhythm and rate: regular and normal (-) no murmur    Pulmonary    Unable to assess               OUTSIDE LABS:  CBC:   Lab Results   Component Value Date    WBC 13.7 (H) 07/02/2022    WBC 14.1 (H) 07/02/2022    HGB 9.2 (L) 07/02/2022    HGB 9.7 (L) 07/02/2022    HCT 30.7 (L) 07/02/2022    HCT 33.1 (L) 07/02/2022     (L) 07/02/2022     (L) 07/02/2022     BMP:   Lab Results   Component Value Date     07/02/2022     07/01/2022    POTASSIUM 4.7 07/02/2022    POTASSIUM 4.5 07/01/2022    CHLORIDE 106 07/02/2022    CHLORIDE 107 07/01/2022    CO2 28 07/02/2022    CO2 29 07/01/2022    BUN 39.7 (H) 07/02/2022    BUN 28.1 (H) 07/01/2022    CR 1.43 (H) 07/02/2022    CR 1.01 (H) 07/01/2022     (H) 07/02/2022     (H) 07/02/2022     COAGS:   Lab Results   Component Value Date    PTT 52 (H) 06/29/2022    INR 1.33 (H) 06/29/2022    FIBR 192 06/29/2022     POC: No results found for: BGM, HCG, HCGS  HEPATIC:   Lab Results   Component Value Date    ALBUMIN 2.6 (L) 06/30/2022    PROTTOTAL 4.0 (L) 06/30/2022    ALT 23 06/30/2022    AST 65 (H) 06/30/2022    ALKPHOS 35 06/30/2022    BILITOTAL 0.3 06/30/2022    LAYLA 25 06/30/2022     OTHER:   Lab Results   Component Value Date    PH 7.19 (LL) 07/02/2022    LACT 0.5 (L) 07/02/2022    A1C 5.8 (H) 06/28/2022    ESTUARDO 8.1 (L) 07/02/2022    PHOS 4.8 (H) 07/02/2022    MAG 2.8 (H) 07/02/2022    AMYLASE 85 06/14/2021     TSH 2.19 09/15/2018           Diagnostics:    EKG 6/30/2021   NSR         Latest Reference Range & Units 06/14/21 10:29 11/11/21 14:27 04/29/22 12:08   FVC-Pred L 3.09 3.09 3.09   FVC-Pre L 2.00 2.17 1.82   FVC-%Pred-Pre % 64 70 58   FEV1-Pre L 0.54 0.53 0.51   FEV1-%Pred-Pre % 21 21 20   FEV1FVC-Pred % 80 80 80   FEV1FVC-Pre % 27 25 28   FEV1SVC-Pred % 77  77   FEV1SVC-Pre % 25  27   FEV1FEV6-Pred % 81 81 81   FEV1FEV6-Pre % 40 37 37   FEFMax-Pred L/sec 6.19 6.19 6.19   FEFMax-Pre L/sec 2.23 2.00 1.82   FEFMax-%Pred-Pre % 35 32 29   FEF2575-Pred L/sec 2.27 2.27 2.27   FEF2575-Pre L/sec 0.15 0.16 0.17   MRL2592-%Pred-Pre % 6 7 7   FIFMax-Pre L/sec 1.93 3.25 3.43   ExpTime-Pre sec 15.89 14.52 11.95   FRCPleth-Pred L 2.64  2.64   FRCPleth-Pre L 5.65  5.52   FRCPleth-%Pred-Pre % 213  208   RVPleth-Pred L 1.84  1.84   RVPleth-Pre L 4.74  4.81   RVPleth-%Pred-Pre % 257  261   TLCPleth-Pred L 4.77  4.77   TLCPleth-Pre L 6.85  6.70   TLCPleth-%Pred-Pre % 143  140   ERV-Pred L 0.69  0.82   ERV-Pre L 0.91  0.71   ERV-%Pred-Pre % 130  86   IC-Pred L 2.47  2.35   IC-Pre L 1.20  1.18   IC-%Pred-Pre % 48  50   VC-Pred L 3.17  3.17   VC-Pre L 2.11  1.89   VC-%Pred-Pre % 66  59   DLCOunc-Pred ml/min/mmHg 19.62  19.62   DLCOunc-Pre ml/min/mmHg 6.27  6.78   DLCOunc-%Pred-Pre % 31  34   DLCOcor-Pre ml/min/mmHg 6.45     DLCOcor-%Pred-Pre % 32     VA-Pre L 3.69  3.58   VA-%Pred-Pre % 79  77     6-minute walk test (6/14/2021): 262 m, NURIA index- 6 (57% estimated 4 year survival)  Quantitative Lung perfusion scan (6/30/21): RIGHT 47%, LEFT 52%.   Sniff test (6/14/21): Normal sniff test.   CXR (6/14/2021): Severe hyperinflation.        CT Chest (6/14/2021):   RML 8 mm solid nodule (series 4, image 186), enlarged from 5 mm in 1/12/20  RLL 9 mm solid nodule (series 4, image 232), new from 1/12/20  LLL 9 mm solid nodule (series 4, image 250), new from 1/12/20  MARLON 10 mm subsolid nodule, new from 1/12/20       Anesthesia Plan    ASA  Status:  4, emergent    NPO Status:  NPO Appropriate    Anesthesia Type: MAC.     - Reason for MAC: straight local not clinically adequate   Induction: Intravenous.      Techniques and Equipment:     - Lines/Monitors: Central Line     - Blood: T&S     Consents    Anesthesia Plan(s) and associated risks, benefits, and realistic alternatives discussed. Questions answered and patient/representative(s) expressed understanding.    - Discussed:     - Discussed with:  Patient      - Extended Intubation/Ventilatory Support Discussed: Yes.      - Patient is DNR/DNI Status: No    Use of blood products discussed: Yes.     - Discussed with: Patient.     - Consented: consented to blood products            Reason for refusal: other.     Postoperative Care    Pain management: IV analgesics.   PONV prophylaxis: Ondansetron (or other 5HT-3)     Comments:    Other Comments: Patient seen and examined.  Risks, benefits and alternatives to GETA discussed.  Questions answered and patient wishes to proceed  Emergent case, to OR.   Twenty four hour events include bump in creatinine and persistent use of BiPAP and levophed drip (0.05mcg/kg/min)                Nathen Bob MD

## 2022-07-02 NOTE — PROGRESS NOTES
"  Vascular Surgery Post Op Check  07/02/2022    S: Pt reports that she has intact sensation and motor function. Feels warm. Denies SOB, chest pain, or dizziness.    O:   Vitals: BP 97/50   Pulse 97   Temp 98  F (36.7  C) (Axillary)   Resp 24   Ht 1.575 m (5' 2\")   Wt 75.2 kg (165 lb 12.6 oz)   SpO2 99%   BMI 30.32 kg/m      Gen: A&O x3, NAD  Chest: remains on BIPAP  Left wrist incision: clean, dry, intact, with palpable radial pulse with strong triphasic signal, palmar arch with good signal, cooler to touch; intact motor and sensory function.    A/P: No acute post-op issues. Continue plan of care as outlined. Please call with any questions.    Matthew Garnica MD  Surgery     "

## 2022-07-02 NOTE — ANESTHESIA CARE TRANSFER NOTE
Patient: Sofie Rodriguez    Procedure: Procedure(s):  LEFT RADIAL ARM THROMBECTOMY       Diagnosis: Hand ischemia, intraoperative [T81.89XA, I99.8]  Diagnosis Additional Information: No value filed.    Anesthesia Type:   MAC     Note:    Oropharynx: oropharynx clear of all foreign objects and CPAP/BIPAP  Level of Consciousness: awake      Independent Airway: airway patency satisfactory and stable  Dentition: dentition unchanged  Vital Signs Stable: post-procedure vital signs reviewed and stable  Report to RN Given: handoff report given  Patient transferred to: ICU  Comments: Pt transported to  on monitors continues on 18/5 BiPAP     ICU Handoff: Call for PAUSE to initiate/utilize ICU HANDOFF, Identified Patient, Identified Responsible Provider, Reviewed the Pertinent Medical History, Discussed Surgical Course, Reviewed Intra-OP Anesthesia Management and Issues during Anesthesia, Set Expectations for Post Procedure Period and Allowed Opportunity for Questions and Acknowledgement of Understanding      Vitals:  Vitals Value Taken Time   BP     Temp     Pulse     Resp     SpO2         Electronically Signed By: RUFUS Salinas CRNA  July 2, 2022  2:06 PM

## 2022-07-02 NOTE — PROGRESS NOTES
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 Sunshine.    TODAY'S PROGRESS:   - Arterial duplex of L hand  - Vascular surgery consult  - Milk of magnesia  - Continue ceftaz, discontinue Bactrim in setting of UTI  - Dong placement  - High ssi   - PM BMP     PLAN:  Neuro/ pain/ sedation:  Acute postoperative pain  - Monitor neurological status. Notify the MD for any acute changes in exam.  - Scheduled: acetaminophen, robaxin, gabapentin. PRN: oxycodone, dilaudid  - Erector spinae catheters placed 6/29  - Sedation: off     Pulmonary:  #Postoperative ventilatory support  #Bilateral Lung Transplant  #Hx COPD  - Titrate FiO2 for SpO2 >92%  - Extubated to BiPAP    On BiPAP 18/5 overnight with slightly improving hypercarbia   Suspect her mental status is maintained with that degree of hypercarbia due to COPD and CNS sensitization  - Pulmonary hygiene: Incentive spirometer every 15- 30 minutes when awake, flutter valve, C&DB, airway clearance once extubated  - Basiliximab POD #0 and #4, methylprednisolone, mycophenolate, tacrolimus  - Valganciclovir  - Small lungs to chest - 30-40 degree head up  - Chest tubes with serosanguinous output     Cardiovascular:  #Hx HTN  #Hx HFpEF  - Monitor hemodynamic status.   - Goal MAP >65  - Epi gtt discontinued 6/29  - Norepinephrine drip, wean as tolerated     GI care/ Nutrition:   - NPO  - NJ not advanced past pylorus  - PPI: protonix  - Continue bowel regimen: miralax; PRN moM    Renal/ Fluid Balance/ Electrolytes:   BL creat appears to be ~ 0.85  - Strict I/O, daily weights  - Avoid/limit nephrotoxins as able  - Replete lytes PRN per protocol     Endocrine:    Stress induced hyperglycemia  - Insulin gtt transitioned to high sliding scale insulin 7/2  - Goal BG <180 for  optimal healing     ID/ Antibiotics:  Stress-induced leukocytosis  - Continue to monitor fever curve, WBC and inflammatory markers as appropriate  - Prophylaxis: POD #8-90 nystatin, TMP/SMX (discontined due to BACILIO), valganciclovir  - Post-op abx: Vancomycin, ceftazadime extended to 14 day coverage given elevated temp and WBCs  - Post-op cultures:   - Gram stain (1+ G+ cocci)   - Resp aerobic cx (4+ G- bacilli)   - Fungal cx (NGTD)   - AFB cx (NGTD)  - Blood cx sent 6/30     Heme:     Acute blood loss anemia  Acute blood loss thrombocytopenia  Concern for left radial thrombus (unclear etiology)  - S/p pRBCs x2 6/29  - Vascular consult for left hand numbness/weakness/duskiness   Arterial duplex showing no flow in radial or ulnar arteries  - High intensity heparin gtt     MSK/ Skin:  Sternotomy  Surgical Incision  - Sternal precautions  - Postoperative incision management per protocol  - PT/OT/CR     Prophylaxis:    - DVT: mechanical + hep gtt given concern for thrombus  - PPI      Lines/ tubes/ drains:  - CTs x5, PA catheter, RIJ, montgomery, PIV x2  - 4 Pleural, 1 med     Disposition:  - CVICU    Patient seen, findings and plan discussed with CV ICU staff, Dr. Leon.    .Vic Montelongo MD  Surgery PGY-3    ====================================    SUBJECTIVE:   Complaining of increasing numbness/weakness of left hand despite interval removal of left radial arterial line 7/1.     OBJECTIVE:   1. VITAL SIGNS:   Temp:  [97.5  F (36.4  C)-99.5  F (37.5  C)] 98.6  F (37  C)  Pulse:  [] 97  Resp:  [10-28] 24  BP: ()/(41-71) 84/47  FiO2 (%):  [30 %] 30 %  SpO2:  [92 %-100 %] 98 %  FiO2 (%): 30 %  Resp: 24      2. INTAKE/ OUTPUT:   I/O last 3 completed shifts:  In: 1960.89 [I.V.:490.89; NG/GT:360; IV Piggyback:1000]  Out: 2511 [Urine:491; Emesis/NG output:700; Chest Tube:1320]    3. PHYSICAL EXAMINATION:   General: Awake, alert, on BiPAP but conversant  Pulm: BiPAP 18/5  Neuro: Communicating with gestures and  writing, moving extremities    4. INVESTIGATIONS:   Arterial Blood Gases   Recent Labs   Lab 07/02/22  0241 06/30/22  1037 06/30/22  0356 06/29/22  2339   PH 7.19* 7.37 7.54* 7.48*   PCO2 77* 51* 34* 40   PO2 93 133* 184* 162*   HCO3 29* 29* 29* 29*     Complete Blood Count   Recent Labs   Lab 07/02/22  1118 07/02/22  0440 07/01/22  0409 06/30/22  0355   WBC 13.7* 14.1* 12.0* 22.2*   HGB 9.2* 9.7* 10.1* 9.8*   * 117* 73* 91*     Basic Metabolic Panel  Recent Labs   Lab 07/02/22  1121 07/02/22  0804 07/02/22  0626 07/02/22  0448 07/02/22  0440 07/01/22  0821 07/01/22  0409 06/30/22  0401 06/30/22  0355 06/29/22  2351 06/29/22  2341   NA  --   --   --   --  143  --  142  --  141  --  140   POTASSIUM  --   --   --   --  4.7  --  4.5  --  4.0  --  4.1   CHLORIDE  --   --   --   --  106  --  107  --  107  --  105   CO2  --   --   --   --  28  --  29  --  26  --  26   BUN  --   --   --   --  39.7*  --  28.1*  --  19.6  --  20.6   CR  --   --   --   --  1.43*  --  1.01*  --  0.98*  --  1.00*   * 177* 158* 154* 164*   < > 92   < > 115*   < > 190*    < > = values in this interval not displayed.     Liver Function Tests  Recent Labs   Lab 06/30/22  0355 06/29/22  2341 06/29/22  0248 06/29/22  0007 06/28/22  1258   AST 65* 78* 143*  --  30   ALT 23 27 33  --  16   ALKPHOS 35 40 36  --  86   BILITOTAL 0.3 0.3 0.9  --  0.4   ALBUMIN 2.6* 2.8* 2.3*  --  5.3*   INR  --   --  1.33* 1.60* 0.91     Pancreatic Enzymes  No lab results found in last 7 days.  Coagulation Profile  Recent Labs   Lab 06/29/22  0248 06/29/22  0007 06/28/22  1258   INR 1.33* 1.60* 0.91   PTT 52* 32 29         5. RADIOLOGY:   Recent Results (from the past 24 hour(s))   XR Fluoro Time 0/1 Hour    Narrative    This exam was marked as non-reportable because it will not be read by a   radiologist or a [facility] non-radiologist provider.         XR Chest Port 1 View    Narrative    XR CHEST PORT 1 VIEW  7/2/2022 2:25 AM      HISTORY: Post-Op  Lung    COMPARISON: 7/1/2022    FINDINGS:   Single AP view of the chest. Postoperative change of bilateral lung  transplantation. Feeding tube courses beyond the field-of-view. Right  IJ central sheath tip in the high SVC with removal of Dema-Dexter.  Bilateral chest tubes and mediastinal drain in similar position.  Stable mediastinum. Trace right apical pneumothorax. No appreciable  right pleural effusion. Small left pleural effusion. Similar perihilar  and bibasilar interstitial and airspace opacities. Enteric contrast in  the stomach.      Impression    IMPRESSION:   1. Interval removal of Dema-Dexter with right IJ sheath tip in the high  SVC. Stable remaining devices.  2. Stable perihilar and bibasilar opacities with small left pleural  effusion.  3. Trace right apical pneumothorax with chest tubes in place.    I have personally reviewed the examination and initial interpretation  and I agree with the findings.    CECI REGAN DO         SYSTEM ID:  [id number]       =========================================

## 2022-07-02 NOTE — PROGRESS NOTES
Lung Transplant Consult Follow Up Note   July 2, 2022            Assessment and Plan:   Sofie Rodriguez is a 62 year old female with a PMH significant for end stage COPD, HTN, Hep C, and osteopenia as well as former methamphetamine use.  Pt. 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  Surgery on CPB, uncomplicated, lungs slightly undersized for chest. Did require some moderate volume resuscitation with low dose pressors post transplant. Extubated 6/30. Progressive CO2 retention. Persistent low dose pressor requirement. Rising creatinine and decreased urine output overnight.     Recommendations today:   - Bipap for CO2 retention/acidosis. At night and as much as tolerated during the day.   - Monitor VBGs  - Sputum airway cultures with stenotrophomonas and Strep pneumo. Continue ceftaz and stop treatment dose bactrim.  - Agree with SLP consult  - Agree with increasing bowel regimen  - Agree with vascular consult and arterial duplex left hand  - Consider IVF for low U/O, ongoing pressor requirements and possible left hand ischemia.  - Tacrolimus 9.1, continue current dose.     S/p bilateral sequential lung transplant (BSLT) for end stage COPD:  Acute on chronic hypoxic respiratory failure: No evidence of PGD post operatively, Extubated to 2L NC on 6/30.  Omgoing low dose pressor requirement. Progressive CO2 retention.  - 7/2 chest x-ray reviewed by me patchy interstitial and airspace opacities not significantly changed compared with yesterday.  - VBG 7.20/79  - CO2 retention but oxygenating well and no evidence of resp distress. Likely reflects preop/chronic CO2 retention. May take some time to re-equilibrate. Recommend BiPAP at night and through the day as much as tolerated. Minimize sedation. Reduce supplemental O2 to keep SaO2 93-96%. Monitor VBGs  - Nebs: levalbuterol and Mucomyst QID   - Aggressive pulmonary toilet with chest physiotherapy QID (addition of Aerobika and incentive spirometry once extubated)  - DSA on 7/5 (ordered)  then one month post-transplant  - Ammonia monitoring every 48 hours x 3 weeks (screening for hyperammonemia post-lung transplant)  - Bronch 6/29 with patent airways, no significant ischemic reperfusion injury, no significant edema, occasional mucous plugs in lower lobes bilaterally but removed upon therapeutic suctioning.   - PVTS catheters placed 6/29  - Chest tubes managed by surgical team  - Daily CXR  - Post-pyloric feeding tube placed by GI  - SLP consult for swallowing evaluation, no indication for NPO status post transplant from pulm perspective, would leave NJ in place until able to meet sudha counts x 3 days  - Explant pathology with end stage emphysema as expected and all lymph nodes benign.      Immunosuppression:  Induction therapy with basiliximab (and high dose IV steroid) given intraoperatively, repeating basiliximab dose on POD#4 (ordered for 7/2).  - Tacrolimus 1 mg SL BID.  Goal tacrolimus level 8-12. Trending daily levels  Date [place]ro Level Intervention   6/29 <1.0 Continue current dose, 1 mg bid   6/30 3.0 Increase to 2 mg bid   7/1 6.9 No adjustment    7/2 9.1  continue current dose    - MMF 1500 mg BID  - Methylprednisolone 125 mg x 3 doses, then prednisolone BID with taper per lung transplant protocol:  Date AM dose (mg) PM dose (mg)   6/30 17.5 17.5   7/7 15 15   7/14 15 12.5   7/21 12.5 12.5   8/4 12.5 10   8/18 10 10   9/15 10 7.5   10/13 7.5 7.5   11/10 7.5 5   12/8 5 5   1/5/23 5 2.5      Prophylaxis:   - Bactrim for PJP ppx deferred initially post-op   - VGCV for CMV ppx (as below to start on 7/6)  - Nystatin for oral candidiasis ppx, 6 month course  - See below for serologies and viral ppx:    Donor Recipient Intervention   CMV status + + Valganciclovir POD #8-90   EBV status + + EBV check monthly   HSV status N/A + No Acyclovir prophylaxis      Primary graft dysfunction (per IS[facility]T guidelines):  See chart below:    POD #0  (~0 hours) POD #1  (~24 hours)   Date 6/29/22 6/30/22   Time 0535  0356   Intubated Y Y   PaO2 163 184   FiO2 40% 40   P/F Ratio 408 460   PGD Grade   (0=mild, 3=severe) 0 0   ECMO N N   Inhaled NO/Flolan N N         BACILIO: Creatinine rising, likely multifactorial including bactrim and possibly volume depletion.   - Creatinine 1.43 (1.01)  - Stop bactrim (see below)  - Gentle IV hydration    Left hand ischemia: Patient complains of tingling and numbness. Fingers dusky. Agree with plan for Vascular consult and Arterial duplex.       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   - Repeat blood cultures   - Continue Iv ceftaz/vanc x 14 day course empirically based on recipient gram stain and fever  - AFB to be sent on all future bronchs, last on 6/29  - IgG at one month 7/29 6/30 blood culture negative to date  6/29 respiratory cultures negative to date  6/28 respiratory culture with Stenotrophomonas maltophilia and Streptococcus pneumoniae       Stenotrophomonas Maltophilia: Noted in right explanted lung washings.   -  Sensitivities reviewed. Stop treatment bactrim. Continue ceftazidime.      H/O Hep C: C: Diagnosed in 1980s, 2 mos of treatment, quant negative since 10/2017, last positive 2/20/17 ([phone]).Glenis positive on 6/2021 with negative HCV PCR. Hx of remote ETOH abuse. MR Elastography 4/27/21 with hepatology review and consult without any concerns post transplant.   6/29 HIV RNA negative  Hepatitis B DNA negative  Hepatitis C RNA negative  Hepatitis C antibody positive (old)       History of steroid induced hyperglycemia: Well controlled in outpatient. Management by primary team currently on insulin gtt, may need endo consult prior to discharge.      We appreciate the excellent care provided by the CVTS and CVICU teams.  Recommendations communicated via in person rounding and this note.  Will continue to follow along closely, please do not hesitate to call with any questions or  concerns.      Ajay Castillo MD  [phone]            Interval History:   CO2 retention overnight with associated acidosis, improved with BiPAP.  Hypotension requiring reinitiation of pressors.  Decreased urine output.    Breathing uncomfortable with BiPAP.  No cough or sputum.  Incisional, adequately controlled with current medication.           Review of Systems:   C: NEGATIVE for fever, chills  INTEGUMENTARY/SKIN: no rash or obvious new lesions  ENT/MOUTH: no sore throat, new sinus pain or nasal drainage  RESP: see interval history  CV: NEGATIVE for chest pain, palpitations  GI: Mild abd discomfort. no nausea, vomiting. No stool since surgery  : no dysuria, decreased urine output  MUSCULOSKELETAL: left hand numbness  PSYCHIATRIC: anxiety          Medications:       acetaminophen  975 mg Oral Q8H     acetylcysteine  2 mL Nebulization 4x Daily     basiliximab (SIMULECT) infusion  20 mg Intravenous Once     [START ON 7/6/2022] calcium carbonate 600 mg-vitamin D 400 units  1 tablet Oral BID w/meals     cefTAZidime  2 g Intravenous Q8H     gabapentin  100 mg Oral TID     heparin ANTICOAGULANT  5,000 Units Subcutaneous Q8H TONY     levalbuterol  1.25 mg Nebulization 4x Daily     multivitamins w/minerals  15 mL Per Feeding Tube Daily     mycophenolate  1,500 mg Oral BID    Or     mycophenolate  1,500 mg Oral or NG Tube BID     nystatin  1,000,000 Units Swish & Swallow 4x Daily     pantoprazole  40 mg Oral or Feeding Tube Daily     polyethylene glycol  17 g Oral BID     prednisoLONE  17.5 mg Oral or NG Tube BID     protein modular  1 packet Per Feeding Tube Daily     senna-docusate  2 tablet Oral BID     sodium chloride (PF)  3 mL Intracatheter Q8H     sulfamethoxazole-trimethoprim  2 tablet Oral or Feeding Tube BID     [START ON 7/11/2022] sulfamethoxazole-trimethoprim  10 mL Oral or NG Tube Daily    Or     [START ON 7/11/2022] sulfamethoxazole-trimethoprim  1 tablet Oral or NG Tube Daily     tacrolimus  2 mg  Sublingual BID IS     [START ON 7/6/2022] valGANciclovir  900 mg Oral or NG Tube Daily     - MEDICATION INSTRUCTIONS -, acetaminophen, bisacodyl, dextrose, dextrose, glucose **OR** dextrose **OR** glucagon, HYDROmorphone **OR** HYDROmorphone, lactated ringers, lidocaine 4%, lidocaine (buffered or not buffered), magnesium hydroxide, naloxone **OR** naloxone **OR** naloxone **OR** naloxone, ondansetron **OR** ondansetron, oxyCODONE **OR** oxyCODONE, prochlorperazine **OR** prochlorperazine, sodium chloride (PF)         Physical Exam:   Temp:  [97.8  F (36.6  C)-99.5  F (37.5  C)] 97.8  F (36.6  C)  Pulse:  [] 85  Resp:  [10-28] 21  BP: ()/(41-84) 91/45  FiO2 (%):  [30 %] 30 %  SpO2:  [92 %-100 %] 95 %    Intake/Output Summary (Last 24 hours) at 7/2/2022 0802  Last data filed at 7/2/2022 0700  Gross per 24 hour   Intake 1949.39 ml   Output 2480 ml   Net -530.61 ml     Constitutional:   Awake, alert and in no apparent distress, BiPAP limiting communication     Eyes:   nonicteric     Neck:   internal jugular line     Lungs:   Diminished air flow.  No crackles. No rhonchi.  No wheezes.     Cardiovascular:   Normal S1 and S2.  RRR.  Loud pericardial rub. No murmur. No gallop.     Abdomen:   NABS, soft, nondistended, mild diffuse tenderness.  No HSM.     Musculoskeletal:   1+ edema. Both hands are cold, left fingers dusky     Neurologic:   Alert and conversant.     Skin:   Warm, dry.  No rash on limited exam.             Data:   All laboratory and imaging data reviewed.    Results for orders placed or performed during the hospital encounter of 06/28/22 (from the past 24 hour(s))   Glucose by meter   Result Value Ref Range    GLUCOSE BY METER POCT 110 (H) 70 - 99 mg/dL   Blood gas venous with oxyhemoglobin   Result Value Ref Range    pH Venous 7.27 (L) 7.32 - 7.43    pCO2 Venous 68 (H) 40 - 50 mm Hg    pO2 Venous 42 25 - 47 mm Hg    Bicarbonate Venous 31 (H) 21 - 28 mmol/L    FIO2 32     Oxyhemoglobin Venous 74  70 - 75 %    Base Excess/Deficit (+/-) 2.8 (H) -7.7 - 1.9 mmol/L   XR Feeding Tube Placement    Narrative    Feeding tube placement.    Comparison:  none.    History: Repositioning of feeding tube (needs postpyloric and lung  transplant).    Fluoroscopy time:  418.9 seconds    Technique: After injection of Xylocaine gel into the right nostril, a  feeding tube was advanced under fluoroscopic guidance.    Findings: The previously placed feeding tube was identified in the  gastric body. Multiple attempts were made to advance the feeding tube  beyond the pylorus however were ultimately unsuccessful. Approximately  1 L of gastric fluid was aspirated from the stomach. The feeding tube  was ultimately left in the gastric antrum. The feeding tube was  secured to the patient's primary care via bridle.      Impression    Impression: Unsuccessful feeding tube advancement. Feeding tube left  within the gastric antrum. Can consider follow up abdominal radiograph  on 7/2/2021 to evaluate positioning.    Dr. Harry, faculty physician, was present during the entire  procedure, including personally attempting tube placement.    I have personally reviewed the examination and initial interpretation  and I agree with the findings.    ALVINA HARRY MD         SYSTEM ID:  [id number]   Glucose by meter   Result Value Ref Range    GLUCOSE BY METER POCT 118 (H) 70 - 99 mg/dL   UPPER GI ENDOSCOPY   Result Value Ref Range    Upper GI Endoscopy       [address] [address] [phone]     Endoscopy Department  _______________________________________________________________________________  Patient Name: Sofie Rodriguez            Procedure Date: 7/1/2022 2:01 PM  MRN: [id number]                       Account Number: [id number]  YOB: 1960               Admit Type: Inpatient  Age: 62                               Room: [place] [address]  Gender: Female                         Note Status: Finalized  Attending MD: TENZIN LOVE MD  Total Sedation Time:   _______________________________________________________________________________     Procedure:             Upper GI endoscopy  Indications:           Malnutrition  Providers:             TENZIN LOVE MD, Sofie Yuen RN, LINDSEY RAMON MD  Patient Profile:       Ms Rodriguez is a 61yo woman post dual lung transplant                          who requies postpyloric tube feeding and  proceeds now                          to an upper endoscopy for tube placement.  Referring MD:          Vinny Berrios  Medicines:             Fentanyl 75 micrograms IV, Midazolam 3 mg IV  Complications:         No immediate complications.  _______________________________________________________________________________  Procedure:             Pre-Anesthesia Assessment:                         - Prior to the procedure, a History and Physical was                          performed, and patient medications and allergies were                          reviewed. The patient is competent. The risks and                          benefits of the procedure and the sedation options and                          risks were discussed with the patient. All questions                          were answered and informed consent was obtained.                          Patient identification and proposed procedure were                          verified by the nurse in the pre-procedure area.                           Mental Status Examination: alert and oriented. Airway                          Examination: Mallampati Class II (the uvula but not                          tonsillar pillars visualized). Respiratory                          Examination: clear to auscultation. CV Examination:                          normal. ASA Grade Assessment: III - A patient with                          severe systemic disease. After reviewing the  "risks and                          benefits, the patient was deemed in satisfactory                          condition to undergo the procedure. The anesthesia                          plan was to use moderate sedation / analgesia                          (conscious sedation). Immediately prior to                          administration of medications, the patient was                          re-assessed for adequacy to receive sedatives. The                          heart rate, respiratory rate, oxygen saturations,                          blood pressure,  adequacy of pulmonary ventilation, and                          response to care were monitored throughout the                          procedure. The physical status of the patient was                          re-assessed after the procedure. After obtaining                          informed consent, the endoscope was passed under                          direct vision. Throughout the procedure, the patient's                          blood pressure, pulse, and oxygen saturations were                          monitored continuously. The peds gastroscope was                          introduced through the mouth, and advanced to the                          second part of duodenum. The upper GI endoscopy was                          accomplished without difficulty. The patient tolerated                          the procedure well.                                                                                   Findings:        films demonstrated wires. A pediatric gastroscope was p assed to        the duodenum without issue through the right nare. Grossly unremarkable        foregut on white light endsocopy. An 0.035\" Glidewire was exchanged and        curled in the proximal jejunum. Over this wire a 12F [place] NJ was        passed with the tip confirmed by fluoroscopy in the distal duodenum. The        tube was then secured with a bridle.                     "                                                               Impression:            - Uncomplicated placement of a 12F [place] NJ tube with                          the distal tip confirmed in the distal duodenum  Recommendation:        - Standard observation during sedation recovery                         - NJ tube may be used for feeds as per dietitian                          instruction without delay                         - The findings and recommendations were discussed with                          the patient and their family                                                                                      electronically signed by RANGEL Love  ________________________  TENZIN LOVE MD  7/1/2022 3:06:01 PM  I was physically present for the entire viewing portion of the exam.  __________________________  Signature of teaching physician  Shelby/O1aRNEGONABNER LOVE MD    _____________________  LINDSEY RAMON MD  Number of Addenda: 0    Note Initiated On: 7/1/2022 2:01 PM  Scope In:  Scope Out:     XR Fluoro Time 0/1 Hour    Narrative    This exam was marked as non-reportable because it will not be read by a   radiologist or a [place] non-radiologist provider.         Glucose by meter   Result Value Ref Range    GLUCOSE BY METER POCT 75 70 - 99 mg/dL   Lactic acid whole blood   Result Value Ref Range    Lactic Acid 0.5 (L) 0.7 - 2.0 mmol/L   Glucose by meter   Result Value Ref Range    GLUCOSE BY METER POCT 113 (H) 70 - 99 mg/dL   Glucose by meter   Result Value Ref Range    GLUCOSE BY METER POCT 128 (H) 70 - 99 mg/dL   Lactic acid whole blood   Result Value Ref Range    Lactic Acid 0.4 (L) 0.7 - 2.0 mmol/L   Blood gas venous with oxyhemoglobin   Result Value Ref Range    pH Venous 7.18 (LL) 7.32 - 7.43    pCO2 Venous 84 (HH) 40 - 50 mm Hg    pO2 Venous 59 (H) 25 - 47 mm Hg    Bicarbonate Venous 31 (H) 21 - 28 mmol/L    FIO2 2     Oxyhemoglobin Venous 86 (H) 70 - 75 %    Base Excess/Deficit (+/-)  1.3 -7.7 - 1.9 mmol/L   Glucose by meter   Result Value Ref Range    GLUCOSE BY METER POCT 115 (H) 70 - 99 mg/dL   Blood gas venous with oxyhemoglobin   Result Value Ref Range    pH Venous 7.14 (LL) 7.32 - 7.43    pCO2 Venous 91 (HH) 40 - 50 mm Hg    pO2 Venous 50 (H) 25 - 47 mm Hg    Bicarbonate Venous 31 (H) 21 - 28 mmol/L    FIO2 30     Oxyhemoglobin Venous 79 (H) 70 - 75 %    Base Excess/Deficit (+/-) 0.2 -7.7 - 1.9 mmol/L   Lactic acid whole blood   Result Value Ref Range    Lactic Acid 0.4 (L) 0.7 - 2.0 mmol/L   Blood gas venous with oxyhemoglobin   Result Value Ref Range    pH Venous 7.16 (LL) 7.32 - 7.43    pCO2 Venous 83 (HH) 40 - 50 mm Hg    pO2 Venous 52 (H) 25 - 47 mm Hg    Bicarbonate Venous 30 (H) 21 - 28 mmol/L    FIO2 30     Oxyhemoglobin Venous 82 (H) 70 - 75 %    Base Excess/Deficit (+/-) -0.3 -7.7 - 1.9 mmol/L   XR Chest Port 1 View    Narrative    XR CHEST PORT 1 VIEW  7/2/2022 2:25 AM      HISTORY: Post-Op Lung    COMPARISON: 7/1/2022    FINDINGS:   Single AP view of the chest. Postoperative change of bilateral lung  transplantation. Feeding tube courses beyond the field-of-view. Right  IJ central sheath tip in the high SVC with removal of [place]-Dexter.  Bilateral chest tubes and mediastinal drain in similar position.  Stable mediastinum. Trace right apical pneumothorax. No appreciable  right pleural effusion. Small left pleural effusion. Similar perihilar  and bibasilar interstitial and airspace opacities. Enteric contrast in  the stomach.      Impression    IMPRESSION:   1. Interval removal of [place]-Dexter with right IJ sheath tip in the high  SVC. Stable remaining devices.  2. Stable perihilar and bibasilar opacities with small left pleural  effusion.  3. Trace right apical pneumothorax with chest tubes in place.    I have personally reviewed the examination and initial interpretation  and I agree with the findings.    CECI REGAN DO         SYSTEM ID:  [id number]   Blood gas arterial   Result  Value Ref Range    pH Arterial 7.19 (LL) 7.35 - 7.45    pCO2 Arterial 77 (HH) 35 - 45 mm Hg    pO2 Arterial 93 80 - 105 mm Hg    FIO2 30     Bicarbonate Arterial 29 (H) 21 - 28 mmol/L    Base Excess/Deficit (+/-) -0.5 -9.0 - 1.8 mmol/L   Lactic acid whole blood   Result Value Ref Range    Lactic Acid 0.5 (L) 0.7 - 2.0 mmol/L   Basic metabolic panel   Result Value Ref Range    Creatinine 1.43 (H) 0.51 - 0.95 mg/dL    Sodium 143 136 - 145 mmol/L    Potassium 4.7 3.4 - 5.3 mmol/L    Urea Nitrogen 39.7 (H) 8.0 - 23.0 mg/dL    Chloride 106 98 - 107 mmol/L    Carbon Dioxide (CO2) 28 22 - 29 mmol/L    Anion Gap 9 7 - 15 mmol/L    Glucose 164 (H) 70 - 99 mg/dL    GFR Estimate 42 (L) >60 mL/min/1.73m2    Calcium 8.1 (L) 8.8 - 10.2 mg/dL   Magnesium   Result Value Ref Range    Magnesium 2.8 (H) 1.7 - 2.3 mg/dL   Phosphorus   Result Value Ref Range    Phosphorus 4.8 (H) 2.5 - 4.5 mg/dL   CBC with platelets differential    Narrative    The following orders were created for panel order CBC with platelets differential.  Procedure                               Abnormality         Status                     ---------                               -----------         ------                     CBC with platelets and d...[[id number]]  Abnormal            Final result               RBC and Platelet Morphology[[id number]]  Abnormal            Final result                 Please view results for these tests on the individual orders.   Blood gas venous with oxyhemoglobin   Result Value Ref Range    pH Venous 7.20 (L) 7.32 - 7.43    pCO2 Venous 79 (HH) 40 - 50 mm Hg    pO2 Venous 37 25 - 47 mm Hg    Bicarbonate Venous 31 (H) 21 - 28 mmol/L    FIO2 30     Oxyhemoglobin Venous 67 (L) 70 - 75 %    Base Excess/Deficit (+/-) 1.4 -7.7 - 1.9 mmol/L   CBC with platelets and differential   Result Value Ref Range    WBC Count 14.1 (H) 4.0 - 11.0 10e3/uL    RBC Count 3.20 (L) 3.80 - 5.20 10e6/uL    Hemoglobin 9.7 (L) 11.7 - 15.7 g/dL    Hematocrit  33.1 (L) 35.0 - 47.0 %     (H) 78 - 100 fL    MCH 30.3 26.5 - 33.0 pg    MCHC 29.3 (L) 31.5 - 36.5 g/dL    RDW 15.7 (H) 10.0 - 15.0 %    Platelet Count 117 (L) 150 - 450 10e3/uL    % Neutrophils 93 %    % Lymphocytes 2 %    % Monocytes 4 %    % Eosinophils 0 %    % Basophils 0 %    % Immature Granulocytes 1 %    NRBCs per 100 WBC 0 <1 /100    Absolute Neutrophils 13.2 (H) 1.6 - 8.3 10e3/uL    Absolute Lymphocytes 0.3 (L) 0.8 - 5.3 10e3/uL    Absolute Monocytes 0.6 0.0 - 1.3 10e3/uL    Absolute Eosinophils 0.0 0.0 - 0.7 10e3/uL    Absolute Basophils 0.0 0.0 - 0.2 10e3/uL    Absolute Immature Granulocytes 0.1 <=0.4 10e3/uL    Absolute NRBCs 0.0 10e3/uL   RBC and Platelet Morphology   Result Value Ref Range    Platelet Assessment  Automated Count Confirmed. Platelet morphology is normal.     Automated Count Confirmed. Platelet morphology is normal.    Bite Cells Slight (A) None Seen    RBC Morphology Confirmed RBC Indices    Glucose by meter   Result Value Ref Range    GLUCOSE BY METER POCT 154 (H) 70 - 99 mg/dL   Glucose by meter   Result Value Ref Range    GLUCOSE BY METER POCT 158 (H) 70 - 99 mg/dL

## 2022-07-02 NOTE — PROGRESS NOTES
SLP NOTE: Pt not appropriate for evaluation this date. Pt on BiPAP and going to the OR; will reschedule for 7/3/22.

## 2022-07-02 NOTE — PLAN OF CARE
Plan of Care Reviewed With: patient, daughter     Major Shift Events: Patients CO2 increasing and pH decreasing. Patient was drowsy but still oriented x4. BiPAP initiated and VBGs closely monitored. CO2 now decreasing and pH increasing. Map decreased to 55. 1L given. BP improved slightly but decreased again. Levo initiated and is running at 0.04 to maintain map>65. No urine output since montgomery removed. Low amount of urine on bladder scan. Provider aware. Tube feedings started. Pericardial friction rub at 0400 assessment.  Plan: Wean levo as able. Monitor VBGs. Increase activity.  For vital signs and complete assessments, please see documentation flowsheets.

## 2022-07-02 NOTE — OP NOTE
Vascular Surgery Operative Note     PREOPERATIVE DIAGNOSIS:  Left hand ischemia secondary to radial artery thrombosis     POSTOPERATIVE DIAGNOSIS:  Same     PROCEDURE: Left radial artery thromboembolectomy     SURGEON:  Christie Graham MD     ASSISTANT:  Charisse Jackson MD     ANESTHESIA:  Local anesthetic (1% lidocaine) and MAC     ESTIMATED BLOOD LOSS:  25 mL    SPECIMENS: Radial artery thrombus      INDICATIONS:  Ms. 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 She had a left radial arterial line and was discovered to have paresthesias and mottling of the left hand, with duplex ultrasonography confirming acute thrombosis of the left radial artery and distal ulnar artery. She was brought to the OR today to undergo thrombectomy.       INTRAOPERATIVE FINDINGS:    Right radial arterial thrombus. Good pulsatile inflow and adequate backbleeding after thrombectomy.   Note: approximately 2 mg of t-PA was injected into the hand intra-arterially.      DESCRIPTION OF TECHNIQUE:   Ms. Rodriguez was brought into the operating room and transferred to the operating table in the supine position. Monitored anesthesia care was initiated. Antibiotics were administered. The left arm  was prepped and draped in standard sterile fashion. Timeout was performed and the entire surgical team was in agreement with the planned procedure and correctly marked side.      Local anesthetic was used to infiltrate the skin overlying the radial artery at the level of the wrist. A longitudinal incision was sharply created and deepened through the subcutaneous tissues with bovie electrocautery. The fascia was incised and the radial artery was exposed. Small venous and arterial branches were ligated and divided and the artery was encircled proximally and distally with vessel loops. A transverse arteriotomy was carefully created. A #2 Ruy embolectomy catheter  was passed retrograde toward the brachial artery and retracted, with return of fresh dark-red thrombus; this was repeated twice, removing additional thrombus with return of bright red pulsatile inflow. The Ruy was then passed antegrade, extending minimally into the palm; it was retracted with thrombus removed and backbleeding present. Heparinized saline, papaverine, and approximately 2 mg of t-PA were injected antegrade via an angiocath. The arteriotomy was closed with interrupted 7-0 prolene sutures and the vessel loops released.     There was a visible pulse at this point in the exposed radial artery. A palmar arch doppler signal was present and the fingers appeared pink and well-perfused. The incision was dried and hemostasis confirmed. The subcutaneous tissues were closed with interrupted absorbable sutures, followed by skin closure in a running subcuticular fashion with a 4-0 Monocryl suture. The skin was cleaned and dried and skin glue applied.     At the end of the case all needle, sponge and instrument counts were correct.  Ms. Rodriguez was then transported in stable condition to the ICU.     PLAN:     Continue neurovascular monitoring of the left hand (use doppler to check palmar arch).     OK to use left hand and use warm packs / ghazal hugger as needed or desired for comfort on left arm.     Continue heparin with no boluses for the next 24-48 hours.     Christie Graham MD

## 2022-07-02 NOTE — PROGRESS NOTES
"Pain Service Progress Note  [facility]  Date: 07/02/2022       Patient Name: Sofie Rodriguez  MRN: [id number]  Age: 62 year old  Sex: female      Assessment:  Sofie Rodriguez is a 62 year old female with a PMH significant for end stage COPD, HTN, Hep C, and osteopenia as well as former methamphetamine use.  Pt. 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     This AM, patient was determined to have a L radial artery thrombus requiring heparin gtt and emergence thromboembolectomy.     Procedure: bilateral lung transplant    Date of Surgery: 6/28/22    Date of Catheter Placement: 6/29/22    Plan/Recommendations:  1. Regional Anesthesia/Analgesia  -Continuous Catheter Type/Site: bilateral erector spinae (ES) T5-6    On first encounter:  -bolused 10 ml of 0.25% bupivacaine to each catheter (20 ml total)   After thrombus:  -Stopped 0.2% ropivacaine, Programmed Intermittent Bolus (PIB) at 7 mL Q60 min via each catheter, total infusion rate of 14 mL/hr  -Removed catheter, with tips intact    2. Anticoagulation  -heparin ANTICOAGULANT injection 5,000 Units, SC, Q8H TONY   -transitioning to heparin gtt given thrombus, after catheters were pulled, instructed primary team to wait 1 hour before starting heparin gtt     3. Multimodal Analgesia  - per primary service    Pain Service will sign off.    Discussed with attending anesthesiologist    Please Page the Pain Team Via [employer]: \"Adult acute inpatient pain management/[facility]\"    Diallo Ramey MD   Anesthesiology Resident  07/02/2022     Overnight Events: Extubated.     Tubes/Drains: Yes      Subjective: Feels that her pain has gotten worse from overnight and asked for a bolus through her catheters.       Symptoms of LAST: No    Pain Location:  Denies pain    Pain Intensity:    Pain at Rest: 4/10     Satisfied with your level of pain control: Yes    Diet: NPO for Medical/Clinical Reasons Except for: No Exceptions  Adult Formula Drip Feeding: Continuous Osmolite 1.5; " "Nasogastric tube; Goal Rate: 45; initiate at 15 ml/hr and advance by 15 ml/hr q8h; do not initiate until FT placement verified; mL/hr; Medication - Feeding Tube Flush Frequency: At least 15-30 m...    Relevant Labs:  Recent Labs   Lab Test 06/30/22  0355 06/29/22  0536 06/29/22  0248   INR  --   --  1.33*   PLT 91*   < > 124*   PTT  --   --  52*   BUN 19.6   < > 16.5    < > = values in this interval not displayed.       Physical Exam:  Vitals: BP (!) 84/47   Pulse 97   Temp 37  C (98.6  F) (Axillary)   Resp 24   Ht 1.575 m (5' 2\")   Wt 75.2 kg (165 lb 12.6 oz)   SpO2 98%   BMI 30.32 kg/m      Physical Exam:   Orientation:  Alert, oriented, and in no acute distress: Yes  Sedation: No    Motor Examination:  5/5 Strength in lower extremities: No      Catheter Site:   Catheter entry site is clean/dry/intact: RN reports site is c,d,i.          Relevant Medications:  Current Pain Medications:  Medications related to Pain Management (From now, onward)    Start     Dose/Rate Route Frequency Ordered Stop    07/01/22 0000  acetaminophen (TYLENOL) tablet 650 mg         650 mg Oral EVERY 4 HOURS PRN 06/29/22 0240      06/29/22 1930  dexmedetomidine (PRECEDEX) 400 mcg in 0.9% sodium chloride 100 mL         0.1-1.2 mcg/kg/hr × 65.7 kg (Dosing Weight)  1.6-19.7 mL/hr  Intravenous CONTINUOUS 06/29/22 1926      06/29/22 1200  ropivacaine 0.2% in NS perineural infusion simple          Perineural Continuous Nerve Block 06/29/22 1042      06/29/22 1200  ropivacaine 0.2% in NS perineural infusion simple          Perineural Continuous Nerve Block 06/29/22 1042      06/29/22 0800  senna-docusate (SENOKOT-S/PERICOLACE) 8.6-50 MG per tablet 1 tablet         1 tablet Oral 2 TIMES DAILY 06/29/22 0240      06/29/22 0800  polyethylene glycol (MIRALAX) Packet 17 g         17 g Oral DAILY 06/29/22 0240      06/29/22 0800  gabapentin (NEURONTIN) capsule 100 mg         100 mg Oral 3 TIMES DAILY 06/29/22 0240      06/29/22 0330  fentaNYL " "(SUBLIMAZE) infusion          mcg/hr  1-2 mL/hr  Intravenous CONTINUOUS 06/29/22 0301      06/29/22 0330  propofol (DIPRIVAN) infusion         5-75 mcg/kg/min × 65.7 kg  2-29.6 mL/hr  Intravenous CONTINUOUS 06/29/22 0301      06/29/22 0239  acetaminophen (TYLENOL) tablet 975 mg         975 mg Oral EVERY 8 HOURS 06/29/22 0240 07/02/22 0559    06/29/22 0239  magnesium hydroxide (MILK OF MAGNESIA) suspension 30 mL         30 mL Oral DAILY PRN 06/29/22 0240      06/29/22 0239  bisacodyl (DULCOLAX) suppository 10 mg         10 mg Rectal DAILY PRN 06/29/22 0240      06/29/22 0239  HYDROmorphone (DILAUDID) injection 0.2 mg        \"Or\" Linked Group Details    0.2 mg Intravenous EVERY 2 HOURS PRN 06/29/22 0240      06/29/22 0239  HYDROmorphone (DILAUDID) injection 0.4 mg        \"Or\" Linked Group Details    0.4 mg Intravenous EVERY 2 HOURS PRN 06/29/22 0240      06/29/22 0239  oxyCODONE (ROXICODONE) tablet 5 mg        \"Or\" Linked Group Details    5 mg Oral EVERY 4 HOURS PRN 06/29/22 0240      06/29/22 0239  oxyCODONE IR (ROXICODONE) tablet 10 mg        \"Or\" Linked Group Details    10 mg Oral EVERY 4 HOURS PRN 06/29/22 0240      06/29/22 0239  lidocaine 1 % 0.1-1 mL         0.1-1 mL Other EVERY 1 HOUR PRN 06/29/22 0240      06/29/22 0239  lidocaine (LMX4) cream          Topical EVERY 1 HOUR PRN 06/29/22 0240                  "

## 2022-07-03 ENCOUNTER — APPOINTMENT (OUTPATIENT)
Dept: SPEECH THERAPY | Facility: CLINIC | Age: 62
DRG: 007 | End: 2022-07-03
Attending: STUDENT IN AN ORGANIZED HEALTH CARE EDUCATION/TRAINING PROGRAM
Payer: MEDICARE

## 2022-07-03 ENCOUNTER — APPOINTMENT (OUTPATIENT)
Dept: PHYSICAL THERAPY | Facility: CLINIC | Age: 62
DRG: 007 | End: 2022-07-03
Attending: THORACIC SURGERY (CARDIOTHORACIC VASCULAR SURGERY)
Payer: MEDICARE

## 2022-07-03 ENCOUNTER — APPOINTMENT (OUTPATIENT)
Dept: GENERAL RADIOLOGY | Facility: CLINIC | Age: 62
DRG: 007 | End: 2022-07-03
Attending: THORACIC SURGERY (CARDIOTHORACIC VASCULAR SURGERY)
Payer: MEDICARE

## 2022-07-03 ENCOUNTER — APPOINTMENT (OUTPATIENT)
Dept: CARDIOLOGY | Facility: CLINIC | Age: 62
DRG: 007 | End: 2022-07-03
Attending: ANESTHESIOLOGY
Payer: MEDICARE

## 2022-07-03 LAB
ANION GAP SERPL CALCULATED.3IONS-SCNC: 3 MMOL/L (ref 7–15)
ANION GAP SERPL CALCULATED.3IONS-SCNC: 7 MMOL/L (ref 7–15)
BASE EXCESS BLDV CALC-SCNC: 6 MMOL/L (ref -7.7–1.9)
BASE EXCESS BLDV CALC-SCNC: 6.5 MMOL/L (ref -7.7–1.9)
BASE EXCESS BLDV CALC-SCNC: 6.7 MMOL/L (ref -7.7–1.9)
BASE EXCESS BLDV CALC-SCNC: 7.3 MMOL/L (ref -7.7–1.9)
BASE EXCESS BLDV CALC-SCNC: 8.4 MMOL/L (ref -7.7–1.9)
BASOPHILS # BLD AUTO: 0 10E3/UL (ref 0–0.2)
BASOPHILS NFR BLD AUTO: 0 %
BUN SERPL-MCNC: 49.7 MG/DL (ref 8–23)
BUN SERPL-MCNC: 59.4 MG/DL (ref 8–23)
CA-I BLD-MCNC: 4.6 MG/DL (ref 4.4–5.2)
CA-I BLD-MCNC: 4.6 MG/DL (ref 4.4–5.2)
CALCIUM SERPL-MCNC: 7.5 MG/DL (ref 8.8–10.2)
CALCIUM SERPL-MCNC: 7.6 MG/DL (ref 8.8–10.2)
CHLORIDE SERPL-SCNC: 106 MMOL/L (ref 98–107)
CHLORIDE SERPL-SCNC: 107 MMOL/L (ref 98–107)
CREAT SERPL-MCNC: 1.8 MG/DL (ref 0.51–0.95)
CREAT SERPL-MCNC: 1.96 MG/DL (ref 0.51–0.95)
DEPRECATED HCO3 PLAS-SCNC: 31 MMOL/L (ref 22–29)
DEPRECATED HCO3 PLAS-SCNC: 32 MMOL/L (ref 22–29)
EOSINOPHIL # BLD AUTO: 0 10E3/UL (ref 0–0.7)
EOSINOPHIL NFR BLD AUTO: 0 %
ERYTHROCYTE [DISTWIDTH] IN BLOOD BY AUTOMATED COUNT: 15.6 % (ref 10–15)
GFR SERPL CREATININE-BSD FRML MDRD: 29 ML/MIN/1.73M2
GFR SERPL CREATININE-BSD FRML MDRD: 32 ML/MIN/1.73M2
GLUCOSE BLDC GLUCOMTR-MCNC: 153 MG/DL (ref 70–99)
GLUCOSE BLDC GLUCOMTR-MCNC: 202 MG/DL (ref 70–99)
GLUCOSE BLDC GLUCOMTR-MCNC: 220 MG/DL (ref 70–99)
GLUCOSE BLDC GLUCOMTR-MCNC: 236 MG/DL (ref 70–99)
GLUCOSE BLDC GLUCOMTR-MCNC: 239 MG/DL (ref 70–99)
GLUCOSE BLDC GLUCOMTR-MCNC: 267 MG/DL (ref 70–99)
GLUCOSE BLDC GLUCOMTR-MCNC: 267 MG/DL (ref 70–99)
GLUCOSE SERPL-MCNC: 211 MG/DL (ref 70–99)
GLUCOSE SERPL-MCNC: 238 MG/DL (ref 70–99)
HCO3 BLDV-SCNC: 35 MMOL/L (ref 21–28)
HCO3 BLDV-SCNC: 36 MMOL/L (ref 21–28)
HCO3 BLDV-SCNC: 36 MMOL/L (ref 21–28)
HCT VFR BLD AUTO: 28.8 % (ref 35–47)
HGB BLD-MCNC: 8.7 G/DL (ref 11.7–15.7)
IMM GRANULOCYTES # BLD: 0.1 10E3/UL
IMM GRANULOCYTES NFR BLD: 1 %
LACTATE SERPL-SCNC: 0.4 MMOL/L (ref 0.7–2)
LVEF ECHO: NORMAL
LYMPHOCYTES # BLD AUTO: 0.5 10E3/UL (ref 0.8–5.3)
LYMPHOCYTES NFR BLD AUTO: 3 %
MAGNESIUM SERPL-MCNC: 2.9 MG/DL (ref 1.7–2.3)
MAGNESIUM SERPL-MCNC: 2.9 MG/DL (ref 1.7–2.3)
MCH RBC QN AUTO: 30.6 PG (ref 26.5–33)
MCHC RBC AUTO-ENTMCNC: 30.2 G/DL (ref 31.5–36.5)
MCV RBC AUTO: 101 FL (ref 78–100)
MONOCYTES # BLD AUTO: 0.8 10E3/UL (ref 0–1.3)
MONOCYTES NFR BLD AUTO: 6 %
NEUTROPHILS # BLD AUTO: 12.5 10E3/UL (ref 1.6–8.3)
NEUTROPHILS NFR BLD AUTO: 90 %
NRBC # BLD AUTO: 0 10E3/UL
NRBC BLD AUTO-RTO: 0 /100
O2/TOTAL GAS SETTING VFR VENT: 1 %
O2/TOTAL GAS SETTING VFR VENT: 21 %
O2/TOTAL GAS SETTING VFR VENT: 21 %
O2/TOTAL GAS SETTING VFR VENT: 30 %
O2/TOTAL GAS SETTING VFR VENT: 30 %
OXYHGB MFR BLDV: 68 % (ref 70–75)
OXYHGB MFR BLDV: 70 % (ref 70–75)
OXYHGB MFR BLDV: 73 % (ref 70–75)
OXYHGB MFR BLDV: 78 % (ref 70–75)
OXYHGB MFR BLDV: 79 % (ref 70–75)
PCO2 BLDV: 71 MM HG (ref 40–50)
PCO2 BLDV: 78 MM HG (ref 40–50)
PCO2 BLDV: 79 MM HG (ref 40–50)
PH BLDV: 7.26 [PH] (ref 7.32–7.43)
PH BLDV: 7.26 [PH] (ref 7.32–7.43)
PH BLDV: 7.27 [PH] (ref 7.32–7.43)
PH BLDV: 7.27 [PH] (ref 7.32–7.43)
PH BLDV: 7.31 [PH] (ref 7.32–7.43)
PHOSPHATE SERPL-MCNC: 2.6 MG/DL (ref 2.5–4.5)
PHOSPHATE SERPL-MCNC: 3 MG/DL (ref 2.5–4.5)
PLATELET # BLD AUTO: 151 10E3/UL (ref 150–450)
PO2 BLDV: 37 MM HG (ref 25–47)
PO2 BLDV: 37 MM HG (ref 25–47)
PO2 BLDV: 41 MM HG (ref 25–47)
PO2 BLDV: 44 MM HG (ref 25–47)
PO2 BLDV: 45 MM HG (ref 25–47)
POTASSIUM SERPL-SCNC: 4.2 MMOL/L (ref 3.4–5.3)
POTASSIUM SERPL-SCNC: 4.4 MMOL/L (ref 3.4–5.3)
RBC # BLD AUTO: 2.84 10E6/UL (ref 3.8–5.2)
SODIUM SERPL-SCNC: 142 MMOL/L (ref 136–145)
SODIUM SERPL-SCNC: 144 MMOL/L (ref 136–145)
TACROLIMUS BLD-MCNC: 8.6 UG/L (ref 5–15)
TME LAST DOSE: NORMAL H
TME LAST DOSE: NORMAL H
UFH PPP CHRO-ACNC: 0.55 IU/ML
UFH PPP CHRO-ACNC: 0.66 IU/ML
UFH PPP CHRO-ACNC: >1.1 IU/ML
WBC # BLD AUTO: 13.9 10E3/UL (ref 4–11)

## 2022-07-03 PROCEDURE — 94640 AIRWAY INHALATION TREATMENT: CPT | Mod: 76

## 2022-07-03 PROCEDURE — 82330 ASSAY OF CALCIUM: CPT

## 2022-07-03 PROCEDURE — 92526 ORAL FUNCTION THERAPY: CPT | Mod: GN

## 2022-07-03 PROCEDURE — 83735 ASSAY OF MAGNESIUM: CPT | Performed by: SURGERY

## 2022-07-03 PROCEDURE — 250N000012 HC RX MED GY IP 250 OP 636 PS 637: Performed by: SURGERY

## 2022-07-03 PROCEDURE — 92612 ENDOSCOPY SWALLOW (FEES) VID: CPT | Mod: GN

## 2022-07-03 PROCEDURE — 250N000013 HC RX MED GY IP 250 OP 250 PS 637: Performed by: SURGERY

## 2022-07-03 PROCEDURE — 82805 BLOOD GASES W/O2 SATURATION: CPT | Performed by: SURGERY

## 2022-07-03 PROCEDURE — 200N000002 HC R&B ICU UMMC

## 2022-07-03 PROCEDURE — 84100 ASSAY OF PHOSPHORUS: CPT | Performed by: SURGERY

## 2022-07-03 PROCEDURE — 94660 CPAP INITIATION&MGMT: CPT

## 2022-07-03 PROCEDURE — 93306 TTE W/DOPPLER COMPLETE: CPT | Mod: 26 | Performed by: INTERNAL MEDICINE

## 2022-07-03 PROCEDURE — 99233 SBSQ HOSP IP/OBS HIGH 50: CPT | Mod: 24 | Performed by: INTERNAL MEDICINE

## 2022-07-03 PROCEDURE — 250N000009 HC RX 250: Performed by: SURGERY

## 2022-07-03 PROCEDURE — 99291 CRITICAL CARE FIRST HOUR: CPT | Mod: 24 | Performed by: ANESTHESIOLOGY

## 2022-07-03 PROCEDURE — 85520 HEPARIN ASSAY: CPT | Performed by: THORACIC SURGERY (CARDIOTHORACIC VASCULAR SURGERY)

## 2022-07-03 PROCEDURE — 94668 MNPJ CHEST WALL SBSQ: CPT

## 2022-07-03 PROCEDURE — 83735 ASSAY OF MAGNESIUM: CPT | Performed by: STUDENT IN AN ORGANIZED HEALTH CARE EDUCATION/TRAINING PROGRAM

## 2022-07-03 PROCEDURE — 93306 TTE W/DOPPLER COMPLETE: CPT

## 2022-07-03 PROCEDURE — 92610 EVALUATE SWALLOWING FUNCTION: CPT | Mod: GN

## 2022-07-03 PROCEDURE — 93010 ELECTROCARDIOGRAM REPORT: CPT | Performed by: INTERNAL MEDICINE

## 2022-07-03 PROCEDURE — 999N000157 HC STATISTIC RCP TIME EA 10 MIN

## 2022-07-03 PROCEDURE — 84100 ASSAY OF PHOSPHORUS: CPT | Performed by: STUDENT IN AN ORGANIZED HEALTH CARE EDUCATION/TRAINING PROGRAM

## 2022-07-03 PROCEDURE — 84520 ASSAY OF UREA NITROGEN: CPT | Performed by: SURGERY

## 2022-07-03 PROCEDURE — 82310 ASSAY OF CALCIUM: CPT | Performed by: SURGERY

## 2022-07-03 PROCEDURE — 97530 THERAPEUTIC ACTIVITIES: CPT | Mod: GP

## 2022-07-03 PROCEDURE — 94640 AIRWAY INHALATION TREATMENT: CPT

## 2022-07-03 PROCEDURE — 80197 ASSAY OF TACROLIMUS: CPT | Performed by: SURGERY

## 2022-07-03 PROCEDURE — 250N000013 HC RX MED GY IP 250 OP 250 PS 637

## 2022-07-03 PROCEDURE — 97110 THERAPEUTIC EXERCISES: CPT | Mod: GP

## 2022-07-03 PROCEDURE — 250N000012 HC RX MED GY IP 250 OP 636 PS 637: Performed by: INTERNAL MEDICINE

## 2022-07-03 PROCEDURE — 82330 ASSAY OF CALCIUM: CPT | Performed by: STUDENT IN AN ORGANIZED HEALTH CARE EDUCATION/TRAINING PROGRAM

## 2022-07-03 PROCEDURE — 71045 X-RAY EXAM CHEST 1 VIEW: CPT

## 2022-07-03 PROCEDURE — 85025 COMPLETE CBC W/AUTO DIFF WBC: CPT | Performed by: SURGERY

## 2022-07-03 PROCEDURE — 83605 ASSAY OF LACTIC ACID: CPT | Performed by: SURGERY

## 2022-07-03 PROCEDURE — 250N000013 HC RX MED GY IP 250 OP 250 PS 637: Performed by: ANESTHESIOLOGY

## 2022-07-03 PROCEDURE — 93005 ELECTROCARDIOGRAM TRACING: CPT

## 2022-07-03 PROCEDURE — 250N000011 HC RX IP 250 OP 636: Performed by: SURGERY

## 2022-07-03 PROCEDURE — 71045 X-RAY EXAM CHEST 1 VIEW: CPT | Mod: 26 | Performed by: RADIOLOGY

## 2022-07-03 RX ORDER — METOPROLOL TARTRATE 1 MG/ML
2.5 INJECTION, SOLUTION INTRAVENOUS EVERY 5 MIN PRN
Status: COMPLETED | OUTPATIENT
Start: 2022-07-03 | End: 2022-07-17

## 2022-07-03 RX ORDER — MIDODRINE HYDROCHLORIDE 5 MG/1
10 TABLET ORAL
Status: DISCONTINUED | OUTPATIENT
Start: 2022-07-03 | End: 2022-07-05

## 2022-07-03 RX ORDER — HEPARIN SODIUM 5000 [USP'U]/.5ML
5000 INJECTION, SOLUTION INTRAVENOUS; SUBCUTANEOUS EVERY 8 HOURS SCHEDULED
Status: DISCONTINUED | OUTPATIENT
Start: 2022-07-04 | End: 2022-07-18

## 2022-07-03 RX ORDER — METHOCARBAMOL 500 MG/1
500 TABLET, FILM COATED ORAL 4 TIMES DAILY
Status: DISCONTINUED | OUTPATIENT
Start: 2022-07-03 | End: 2022-07-07

## 2022-07-03 RX ORDER — ASPIRIN 81 MG/1
81 TABLET, CHEWABLE ORAL DAILY
Status: DISCONTINUED | OUTPATIENT
Start: 2022-07-03 | End: 2022-08-05

## 2022-07-03 RX ORDER — LIDOCAINE 4 G/G
2 PATCH TOPICAL
Status: DISCONTINUED | OUTPATIENT
Start: 2022-07-03 | End: 2022-07-13

## 2022-07-03 RX ADMIN — ACETYLCYSTEINE 2 ML: 200 SOLUTION ORAL; RESPIRATORY (INHALATION) at 08:35

## 2022-07-03 RX ADMIN — ASPIRIN 81 MG CHEWABLE TABLET 81 MG: 81 TABLET CHEWABLE at 20:19

## 2022-07-03 RX ADMIN — LEVALBUTEROL HYDROCHLORIDE 1.25 MG: 1.25 SOLUTION RESPIRATORY (INHALATION) at 16:05

## 2022-07-03 RX ADMIN — OXYCODONE HYDROCHLORIDE 10 MG: 10 TABLET ORAL at 18:55

## 2022-07-03 RX ADMIN — ACETYLCYSTEINE 2 ML: 200 SOLUTION ORAL; RESPIRATORY (INHALATION) at 21:19

## 2022-07-03 RX ADMIN — METHOCARBAMOL 500 MG: 500 TABLET ORAL at 12:11

## 2022-07-03 RX ADMIN — CEFTAZIDIME 2 G: 2 INJECTION, POWDER, FOR SOLUTION INTRAVENOUS at 20:56

## 2022-07-03 RX ADMIN — TACROLIMUS 2 MG: 1 CAPSULE ORAL at 18:30

## 2022-07-03 RX ADMIN — INSULIN ASPART 3 UNITS: 100 INJECTION, SOLUTION INTRAVENOUS; SUBCUTANEOUS at 23:47

## 2022-07-03 RX ADMIN — GABAPENTIN 100 MG: 100 CAPSULE ORAL at 13:42

## 2022-07-03 RX ADMIN — HYDROMORPHONE HYDROCHLORIDE 0.2 MG: 0.2 INJECTION, SOLUTION INTRAMUSCULAR; INTRAVENOUS; SUBCUTANEOUS at 15:49

## 2022-07-03 RX ADMIN — OXYCODONE HYDROCHLORIDE 10 MG: 10 TABLET ORAL at 05:21

## 2022-07-03 RX ADMIN — LEVALBUTEROL HYDROCHLORIDE 1.25 MG: 1.25 SOLUTION RESPIRATORY (INHALATION) at 21:18

## 2022-07-03 RX ADMIN — Medication 1 PACKET: at 12:12

## 2022-07-03 RX ADMIN — MYCOPHENOLATE MOFETIL 1500 MG: 200 POWDER, FOR SUSPENSION ORAL at 08:00

## 2022-07-03 RX ADMIN — ACETAMINOPHEN 975 MG: 325 TABLET, FILM COATED ORAL at 06:15

## 2022-07-03 RX ADMIN — ACETYLCYSTEINE 2 ML: 200 SOLUTION ORAL; RESPIRATORY (INHALATION) at 13:01

## 2022-07-03 RX ADMIN — PREDNISOLONE 17.5 MG: 15 SOLUTION ORAL at 08:00

## 2022-07-03 RX ADMIN — CEFTAZIDIME 2 G: 2 INJECTION, POWDER, FOR SOLUTION INTRAVENOUS at 07:59

## 2022-07-03 RX ADMIN — NYSTATIN 1000000 UNITS: 100000 SUSPENSION ORAL at 07:53

## 2022-07-03 RX ADMIN — Medication 40 MG: at 08:00

## 2022-07-03 RX ADMIN — NYSTATIN 1000000 UNITS: 100000 SUSPENSION ORAL at 15:49

## 2022-07-03 RX ADMIN — OXYCODONE HYDROCHLORIDE 10 MG: 10 TABLET ORAL at 01:02

## 2022-07-03 RX ADMIN — GABAPENTIN 100 MG: 100 CAPSULE ORAL at 20:19

## 2022-07-03 RX ADMIN — INSULIN ASPART 6 UNITS: 100 INJECTION, SOLUTION INTRAVENOUS; SUBCUTANEOUS at 20:45

## 2022-07-03 RX ADMIN — NYSTATIN 1000000 UNITS: 100000 SUSPENSION ORAL at 12:11

## 2022-07-03 RX ADMIN — PREDNISOLONE 17.5 MG: 15 SOLUTION ORAL at 20:20

## 2022-07-03 RX ADMIN — ACETYLCYSTEINE 2 ML: 200 SOLUTION ORAL; RESPIRATORY (INHALATION) at 16:06

## 2022-07-03 RX ADMIN — LEVALBUTEROL HYDROCHLORIDE 1.25 MG: 1.25 SOLUTION RESPIRATORY (INHALATION) at 13:01

## 2022-07-03 RX ADMIN — INSULIN ASPART 6 UNITS: 100 INJECTION, SOLUTION INTRAVENOUS; SUBCUTANEOUS at 15:36

## 2022-07-03 RX ADMIN — GABAPENTIN 100 MG: 100 CAPSULE ORAL at 08:00

## 2022-07-03 RX ADMIN — OXYCODONE HYDROCHLORIDE 10 MG: 10 TABLET ORAL at 13:42

## 2022-07-03 RX ADMIN — HYDROMORPHONE HYDROCHLORIDE 0.2 MG: 0.2 INJECTION, SOLUTION INTRAMUSCULAR; INTRAVENOUS; SUBCUTANEOUS at 21:40

## 2022-07-03 RX ADMIN — NYSTATIN 1000000 UNITS: 100000 SUSPENSION ORAL at 20:19

## 2022-07-03 RX ADMIN — LIDOCAINE PATCH 4% 2 PATCH: 40 PATCH TOPICAL at 09:40

## 2022-07-03 RX ADMIN — INSULIN ASPART 1 UNITS: 100 INJECTION, SOLUTION INTRAVENOUS; SUBCUTANEOUS at 08:15

## 2022-07-03 RX ADMIN — TACROLIMUS 2 MG: 1 CAPSULE ORAL at 07:53

## 2022-07-03 RX ADMIN — OXYCODONE HYDROCHLORIDE 10 MG: 10 TABLET ORAL at 22:28

## 2022-07-03 RX ADMIN — HYDROMORPHONE HYDROCHLORIDE 0.2 MG: 0.2 INJECTION, SOLUTION INTRAMUSCULAR; INTRAVENOUS; SUBCUTANEOUS at 08:20

## 2022-07-03 RX ADMIN — METHOCARBAMOL 500 MG: 500 TABLET ORAL at 09:40

## 2022-07-03 RX ADMIN — HYDROMORPHONE HYDROCHLORIDE 0.2 MG: 0.2 INJECTION, SOLUTION INTRAMUSCULAR; INTRAVENOUS; SUBCUTANEOUS at 12:11

## 2022-07-03 RX ADMIN — ACETAMINOPHEN 975 MG: 325 TABLET, FILM COATED ORAL at 22:28

## 2022-07-03 RX ADMIN — OXYCODONE HYDROCHLORIDE 10 MG: 10 TABLET ORAL at 09:40

## 2022-07-03 RX ADMIN — INSULIN ASPART 4 UNITS: 100 INJECTION, SOLUTION INTRAVENOUS; SUBCUTANEOUS at 03:34

## 2022-07-03 RX ADMIN — ACETAMINOPHEN 975 MG: 325 TABLET, FILM COATED ORAL at 13:42

## 2022-07-03 RX ADMIN — MULTIVITAMIN 15 ML: LIQUID ORAL at 08:00

## 2022-07-03 RX ADMIN — LEVALBUTEROL HYDROCHLORIDE 1.25 MG: 1.25 SOLUTION RESPIRATORY (INHALATION) at 08:35

## 2022-07-03 RX ADMIN — INSULIN ASPART 4 UNITS: 100 INJECTION, SOLUTION INTRAVENOUS; SUBCUTANEOUS at 00:10

## 2022-07-03 RX ADMIN — INSULIN ASPART 4 UNITS: 100 INJECTION, SOLUTION INTRAVENOUS; SUBCUTANEOUS at 11:25

## 2022-07-03 RX ADMIN — METHOCARBAMOL 500 MG: 500 TABLET ORAL at 15:48

## 2022-07-03 RX ADMIN — MYCOPHENOLATE MOFETIL 1500 MG: 250 CAPSULE ORAL at 20:19

## 2022-07-03 RX ADMIN — METHOCARBAMOL 500 MG: 500 TABLET ORAL at 20:19

## 2022-07-03 RX ADMIN — MIDODRINE HYDROCHLORIDE 10 MG: 5 TABLET ORAL at 16:28

## 2022-07-03 ASSESSMENT — ACTIVITIES OF DAILY LIVING (ADL)
ADLS_ACUITY_SCORE: 30
ADLS_ACUITY_SCORE: 29
ADLS_ACUITY_SCORE: 30

## 2022-07-03 NOTE — PLAN OF CARE
Plan of Care Reviewed With: patient, daughter     Major Shift Events:  Sinus rhythm. BiPAP 18/5 30%. A&Ox4. Afebrile. Norepi drip for map >65. Tube feeding. Bowel movements x2 overnight. Low urine output (~15/hr). L radial, L ulnar, and L palmar arch pulses are present with doppler. Pain controlled with PRN medication. Heparin drip per protocol. Daughter (Charity) updated via phone at 2030.     Plan: Wean norepi as able. Pending speech swallow eval.     For vital signs and complete assessments, please see documentation flowsheets.

## 2022-07-03 NOTE — PROGRESS NOTES
07/03/22 1048   General Information   Onset of Illness/Injury or Date of Surgery 07/02/22   Referring Physician Charisse Jackson MD   Patient/Family Therapy Goal Statement (SLP) Pt wants to eat and drink   Pertinent History of Current Problem Sofie Rodriguez is a 62 year old female with a PMH significant for end stage COPD, HTN, Hep C, and osteopenia as well as former methamphetamine use.  Pt. 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  Surgery on CPB, uncomplicated, lungs slightly undersized for chest. Did require some moderate volume resuscitation with low dose pressors post transplant. Extubated 6/30. Progressive CO2 retention. Pt also s/p left radial thrombectomy (7/2/22).  FEES completed per MD to assess oropharyngeal swallowing mechanism s/p BSLT, pt medically cleared for formal swallow assessment per pulmonology MD team.   General Observations Pt awake and alert, conversational   Past History of Dysphagia None per EMR or pt report   Pain Assessment   Patient Currently in Pain No   Type of Evaluation   Type of Evaluation Swallow Evaluation   Oral Motor   Oral Musculature generally intact   Structural Abnormalities none present   Dentition (Oral Motor)   Dentition (Oral Motor) some missing teeth;adequate dentition   Facial Symmetry (Oral Motor)   Facial Symmetry (Oral Motor) WNL   Lip Function (Oral Motor)   Lip Range of Motion (Oral Motor) WNL   Tongue Function (Oral Motor)   Tongue ROM (Oral Motor) WNL   Jaw Function (Oral Motor)   Jaw Function (Oral Motor) WNL   Cough/Swallow/Gag Reflex (Oral Motor)   Volitional Throat Clear/Cough (Oral Motor) WNL   Volitional Swallow (Oral Motor) WNL   Vocal Quality/Secretion Management (Oral Motor)   Vocal Quality (Oral Motor) WNL   Secretion Management (Oral Motor) WNL   General Swallowing Observations   Respiratory Support (General Swallowing Observations) none  (intermittent BiPAP per CO2 ventilaition)   Current Diet/Method of Nutritional Intake (General Swallowing Observations,  NIS) NPO;nasogastric tube (NG)   Swallowing Evaluation Fiberoptic Endoscopic Evaluation of Swallowing (FEES)   Clinical Swallow Evaluation   Feeding Assistance minimal assistance required   FEES Eval   Signs/Symptoms Noted None   Type of Scope Pediatric   Scope Serial Number [id number]   Nares Entry nostril entered using no topical anesthetic   Nares Passage Scope passed though right nares   NG Tube/Nasal O2 cannula NG tube present   Symmetry of Anatomy upon command;upon entry  (small posterior glottic gap)   Location of Secretions WFL   Amount of Secretions WFL   Endoscope Insertion (General Swallowing Observations, FEES) endoscope passed through right nostril;no anesthetic used   FEES Textures Trialed thin liquids;mildly thick liquids;puree textures;solid foods   FEES Eval: Thin Liquid Texture Trial   Mode of Presentation, Thin Liquid cup;spoon;straw;fed by clinician   Pre-swallow secretions noted   (wfl)   Location of Pre-spillage Superior epiglottis into posterior side   Location of residuals Spillage through interarytenoid space   Penetration? Yes   Aspiration? No   Epiglottic inversion Yes   Residuals in laryngeal vestibule Yes  (minimal, intermittent)   Location of Penetration Through interarytenoid space   Penetration Occurence During swallow   Patient Response to Penetration Clears with cueing   Residuals All residuals cleared with cueing   FEES Eval: Mildly Thick Liquids    Mode of presentation straw;fed by clinician   Pre-swallow secretions noted   (WFL)   Post swallow residuals Cricopharyngeal area   Penetration? Yes   Aspiration? No   Epiglottic inversion Yes   Residuals in laryngeal vestibule No   Location of Penetration Over left aryepiglottic folds   Penetration Occurrence During swallow   Patient Response to Penetration Independently clears   FEES Eval: Puree Solid Texture Trial   Mode of Presentation, Puree spoon;fed by clinician   Pre-swallow secretions noted   (no)   Post swallow residuals  Valleculae  (WFL)   Penetration? No   Aspiration? No   Epiglottic inversion Yes   Residuals in laryngeal vestibule No   FEES Eval: Solid Food Texture Trial   Mode of Presentation fed by clinician   Pre-swallow secretions noted.   (WFL)   Post swallow residuals   (WFL)   Penetration? No   Aspiration? No   Epiglottic inversion Yes   Residuals in laryngeal vestibule No   Esophageal Phase of Swallow   Patient reports or presents with symptoms of esophageal dysphagia No   Swallowing Recommendations   Diet Consistency Recommendations thin liquids (level 0);regular diet   Supervision Level for Intake 1:1 supervision needed   Mode of Delivery Recommendations bolus size, small;slow rate of intake   Swallowing Maneuver Recommendations throat clear-swallow;alternate food and liquid intake;effortful (hard) swallow   Monitoring/Assistance Required (Eating/Swallowing) stop eating activities when fatigue is present;monitor for cough or change in vocal quality with intake;optimize oral intake to minimize need for tube feeding   Recommended Feeding/Eating Techniques (Swallow Eval) maintain upright sitting position for eating;minimize distractions during oral intake;provide assist with feeding;provide 6 smaller meals throughout day;schedule meals prior to therapy to avoid therapy fatigue;set-up and prepare tray   Medication Administration Recommendations, Swallowing (SLP) Pills whole in puree or continue non-oral   Instrumental Assessment Recommendations reassess via non-instrumental clinical swallow evaluation   General Therapy Interventions   Planned Therapy Interventions Dysphagia Treatment   Dysphagia treatment Instruction of safe swallow strategies   Clinical Impression   Criteria for Skilled Therapeutic Interventions Met (SLP Eval) Yes, treatment indicated   SLP Diagnosis Minimal oropharyngeal dysphagia   Risks & Benefits of therapy have been explained evaluation/treatment results reviewed;care plan/treatment goals  reviewed;risks/benefits reviewed;current/potential barriers reviewed;participants voiced agreement with care plan;participants included;patient;daughter   Clinical Impression Comments FEES completed per MD order. Pt presents with minimal oropharyngeal swallowing mechanism in setting of weakness and deconditioning. Flexible pediatric endoscope passed through right nare without incident. Nasal cavity and velopharynx WNL.  Pharyngeal/laryngeal anatomy judged to be grossly symmetrical.  Hypopharynx WNL.  Vocal folds with adequate mobility, small posterior glottic gap noted. Secretions WFL.  Pt assessed with thin via spoon/straw, mildly-thick via straw, puree, and solid texture.  Pharyngeal phase characterized by delayed swallow initiation with bolus head progressing to pyriform sinuses or posteriorially over epiglottis with thin and mildly-thick liquids.  BOT movement and pharyngeal constriction WFL.  Epiglottic inversion complete.  Intermittent shallow to deep penetration with thin and mildly-thick liquids via straw during the swallow, resulting in minimal residue in laryngeal vestibule.  Pt able to clear this residue with use of cued throat clear-swallow.  Use of a chin tuck did not eliminate penetration.  No aspiration or penetration with puree or solid textures. Pharyngeal residue present WFL. Recommend regular diet and thin liquids with with supervision.  Pt should use throat clear-swallow strategy after every 1-2 sips of thin liquids. Critical meds whole in puree or continue non-oral. Ensure pt is fully alert and upright for all PO, given small bites/sips, alternating bites/sips.  SLP to follow for short course to ensure PO tolerance and use of strategies, anticipate pt will meet swallow goals prior to discharge.   SLP Discharge Planning   SLP Discharge Recommendation Transitional Care Facility;Acute Rehab Center-Motivated patient will benefit from intensive, interdisciplinary therapy.  Anticipate will be able to  tolerate 3 hours of therapy per day   SLP Rationale for DC Rec Minimal oropharyngeal dysphagia   SLP Brief overview of current status  Recommend regular diet and thin liquids with with supervision.  Pt should use throat clear-swallow strategy after every 1-2 sips of thin liquids. Critical meds whole in puree or continue non-oral. Ensure pt is fully alert and upright for all PO, given small bites/sips, alternating bites/sips.  SLP to follow for short course to ensure PO tolerance and use of strategies, anticipate pt will meet swallow goals prior to discharge.    Total Evaluation Time   Total Evaluation Time (Minutes) 15   SLP Goals   Therapy Frequency (SLP Eval) 4 times/wk   SLP Predicted Duration/Target Date for Goal Attainment 09/02/22   SLP Goals Swallow   SLP: Safely tolerate diet without signs/symptoms of aspiration Regular diet;Thin liquids;Independently;With use of swallow precautions

## 2022-07-03 NOTE — PLAN OF CARE
Neuro: A&Ox4. Drowsy after afternoon activity.    Cardiac: SR 90's. MAP >65 with 0.12 levo, started on 10 mg midodrine TID.           Respiratory: Sating >92% on BiPAP, rate 24, 18/5, 21% Fi02.  Was on RA-1L 02 via NC from 9675-9415, tolerated well; last CO2 was 78, pH 7.27.  She felt great, RR low 20's without BiPap. Plan to wear BiPap overnight and reassess need in a.m.  GI/: 15-30 mL/2hrs of UOP via [place].  X5 loose watery BM's, incontinent of stool, rectal pouch placed.   Diet/appetite: NPO with TF's at goal of 45 mL/hr via [place] tube. 30 mL/4hrs FWF.  SLP completed swallow study and rec thins/regular.  Tolerated thins while upright, had half of an apple sauce and half of a pudding.   Activity:  Assist of 2 for transfers to chair/commode.  Spent about 90 minutes in mat[place]w, very tired after.  Turned q2hrs.  Pain: At acceptable level on current regimen. Oxycodone given q4hrs, 5 mg. IV dilaudid given twice. Methocarbamol started.   Skin: No new deficits noted.  LDA's:  R MAC, NJ.     Plan: Starting lantus for elevated BS. Heparin to stop at 2000, per vascular 81 mg aspirin for L radial thrombectomy.  L hand cool to touch, <3 cap refill; reports tingling to digits but numbness has resolved; pulses with doppler.  Continue with POC. Notify primary team with changes

## 2022-07-03 NOTE — PROGRESS NOTES
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 Sunshine.    Today's progress:  - bipap take off for couple hours today and then get vbg  - echo today  - decrease fio2 to 21%  - swallow study tomorrow  - high intensity hep gtt to end tonight at 8 pm per vascular surgery  - start ASA 81 mg tonight at 8 pm per vascular surgery      YESTERDAY'S PROGRESS:   - Arterial duplex of L hand  - Vascular surgery consult  - Milk of magnesia  - Continue ceftaz, discontinue Bactrim in setting of UTI  - Dong placement  - High ssi   - PM BMP     PLAN:  Neuro/ pain/ sedation:  Acute postoperative pain  - Monitor neurological status. Notify the MD for any acute changes in exam.  - Scheduled: acetaminophen, robaxin, gabapentin. PRN: oxycodone, dilaudid     Pulmonary:  #Postoperative ventilatory support  #Bilateral Lung Transplant  #Hx COPD  - Titrate FiO2 for SpO2 >92%  - Extubated to BiPAP    On BiPAP 18/5 overnight with slightly improving hypercarbia   Suspect her mental status is maintained with that degree of hypercarbia due to COPD and CNS sensitization  - Pulmonary hygiene: Incentive spirometer every 15- 30 minutes when awake, flutter valve, C&DB, airway clearance   - Basiliximab POD #0 and #4, methylprednisolone, mycophenolate, tacrolimus  - Valganciclovir  - Small lungs to chest - 30-40 degree head up  - Chest tubes with serosanguinous output     Cardiovascular:  #Hx HTN  #Hx HFpEF  - Monitor hemodynamic status.   - Goal MAP >65  - Epi gtt discontinued 6/29  - Norepinephrine drip, wean as tolerated     GI care/ Nutrition:   - NPO  - NJ   - PPI: protonix  - Continue bowel regimen: miralax; PRN moM    Renal/ Fluid Balance/ Electrolytes:   BL creat appears to be ~ 0.85  - Strict I/O, daily weights  - Avoid/limit  nephrotoxins as able  - Replete lytes PRN per protocol     Endocrine:    Stress induced hyperglycemia  - Insulin gtt transitioned to high sliding scale insulin 7/2  - Goal BG <180 for optimal healing     ID/ Antibiotics:  Stress-induced leukocytosis  - Continue to monitor fever curve, WBC and inflammatory markers as appropriate  - Prophylaxis: POD #8-90 nystatin, TMP/SMX (discontined due to BACILIO), valganciclovir  - Post-op abx: ceftazadime extended to 14 day coverage given elevated temp and WBCs  - Post-op cultures:   - Gram stain (1+ G+ cocci)   - Resp aerobic cx (4+ G- bacilli)   - Fungal cx (NGTD)   - AFB cx (NGTD)  - Blood cx sent 6/30     Heme:     Acute blood loss anemia  Acute blood loss thrombocytopenia  Concern for left radial thrombus (unclear etiology)  - S/p pRBCs x2 6/29  - Vascular consult for left hand numbness/weakness/duskiness   Arterial duplex showing no flow in radial or ulnar arteries  - High intensity heparin gtt     MSK/ Skin:  Sternotomy  Surgical Incision  - Sternal precautions  - Postoperative incision management per protocol  - PT/OT/CR     Prophylaxis:    - DVT: mechanical + hep gtt given concern for thrombus  - PPI      Lines/ tubes/ drains:  - CTs x5, RIJ, montgomery, PIV x2  - 4 Pleural, 1 med     Disposition:  - [facility]    Patient seen, findings and plan discussed with CV ICU staff, Dr. Leon.    Andres Davis  PGY-4 Anesthesiology  Pager [phone]      ====================================    SUBJECTIVE:   naeo    OBJECTIVE:   1. VITAL SIGNS:   Temp:  [97.5  F (36.4  C)-98.7  F (37.1  C)] 98.2  F (36.8  C)  Pulse:  [] 98  Resp:  [10-41] 25  BP: ()/(43-71) 94/54  FiO2 (%):  [30 %] 30 %  SpO2:  [88 %-100 %] 100 %  FiO2 (%): 30 %  Resp: 25      2. INTAKE/ OUTPUT:   I/O last 3 completed shifts:  In: 3319.25 [I.V.:804.25; NG/GT:470; IV Piggyback:1500]  Out: 1827 [Urine:462; Blood:25; Chest Tube:1340]    3. PHYSICAL EXAMINATION:   General: Awake, alert, on BiPAP but conversant  Pulm:  BiPAP 18/5  Neuro: Communicating with gestures and writing, moving extremities    4. INVESTIGATIONS:   Arterial Blood Gases   Recent Labs   Lab 07/02/22  0241 06/30/22  1037 06/30/22  0356 06/29/22  2339   PH 7.19* 7.37 7.54* 7.48*   PCO2 77* 51* 34* 40   PO2 93 133* 184* 162*   HCO3 29* 29* 29* 29*     Complete Blood Count   Recent Labs   Lab 07/03/22  0333 07/02/22  1118 07/02/22  0440 07/01/22  0409   WBC 13.9* 13.7* 14.1* 12.0*   HGB 8.7* 9.2* 9.7* 10.1*    120* 117* 73*     Basic Metabolic Panel  Recent Labs   Lab 07/03/22  0334 07/03/22  0333 07/03/22  0010 07/02/22  2045 07/02/22  1501 07/02/22  1456 07/02/22  0448 07/02/22  0440 07/01/22  0821 07/01/22  0409   NA  --  142  --   --   --  143  --  143  --  142   POTASSIUM  --  4.2  --   --   --  4.1  --  4.7  --  4.5   CHLORIDE  --  107  --   --   --  109*  --  106  --  107   CO2  --  32*  --   --   --  28  --  28  --  29   BUN  --  49.7*  --   --   --  44.6*  --  39.7*  --  28.1*   CR  --  1.80*  --   --   --  1.56*  --  1.43*  --  1.01*   * 238* 236* 165*   < > 113*   < > 164*   < > 92    < > = values in this interval not displayed.     Liver Function Tests  Recent Labs   Lab 06/30/22  0355 06/29/22  2341 06/29/22  0248 06/29/22  0007 06/28/22  1258   AST 65* 78* 143*  --  30   ALT 23 27 33  --  16   ALKPHOS 35 40 36  --  86   BILITOTAL 0.3 0.3 0.9  --  0.4   ALBUMIN 2.6* 2.8* 2.3*  --  5.3*   INR  --   --  1.33* 1.60* 0.91     Pancreatic Enzymes  No lab results found in last 7 days.  Coagulation Profile  Recent Labs   Lab 06/29/22  0248 06/29/22  0007 06/28/22  1258   INR 1.33* 1.60* 0.91   PTT 52* 32 29         5. RADIOLOGY:   Recent Results (from the past 24 hour(s))   US Upper Ext Arterial Duplex Left Port   Result Value    Radiologist flags Occlusion of the radial artery and distal ulnar (Urgent)    Narrative    Duplex Doppler ultrasound assessment of the left upper extremity  7/2/2022 10:44 AM    Clinical information: Had left radial  arterial line removed 7/2,  worsening paresthesia and weakness    Ordering provider:    Technique: B-mode (grayscale) and duplex Doppler ultrasound of the  upper extremity arteries. Velocity measurements obtained with angle  correction at or less than 60 degrees.    Findings:     Peak systolic velocities:    Left Upper Extremity Arteries:    Subclavian: 132 cm/sec    Axillary artery: 126 cm/sec    Brachial proximal: 135 cm/sec   Brachial mid: 168 cm/sec  Brachial antecubital: 145 cm/sec    Occlusive thrombus throughout the visualized radial artery.    Ulnar artery origin: 170 cm/sec  Ulnar artery proximal: 86 cm/sec  Ulnar artery mid forearm: 68 cm/sec  Unable to detect Doppler flow within the ulnar artery at the level of  the wrist.      Impression    Impression:  1. Occlusive thrombus throughout the extent of the visualized radial  artery.  2. Diminished arterial flow within the distal ulnar artery. No Doppler  flow is detected within the ulnar artery at the level of the wrist.  3. The subclavian, axillary and brachial arteries are patent with  normal Doppler flow.    [Urgent Result: Occlusion of the radial artery and distal ulnar  artery]    Finding was identified on 7/2/2022 12:18 PM.     Dr. Montelongo was contacted by Dr. Zepeda at 7/2/2022 12:37 PM and  verbalized understanding of the urgent finding.      I have personally reviewed the examination and initial interpretation  and I agree with the findings.    PAULINE PORRAS MD         SYSTEM ID:  [id number]   XR Chest Port 1 View    Impression    RESIDENT PRELIMINARY INTERPRETATION  IMPRESSION:   1. Stable support devices.  2. No definite pneumothorax.  3. Decreased perihilar and bibasilar opacities with trace left pleural  effusion.       =========================================

## 2022-07-03 NOTE — PROGRESS NOTES
Lung Transplant Consult Follow Up Note   July 3, 2022            Assessment and Plan:   Sofie Rodriguez is a 62 year old female with a PMH significant for end stage COPD, HTN, Hep C, and osteopenia as well as former methamphetamine use.  Pt. 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  Surgery on CPB, uncomplicated, lungs slightly undersized for chest. Did require some moderate volume resuscitation with low dose pressors post transplant. Extubated 6/30. Progressive CO2 retention, improved with BiPAP. Persistent low dose pressor requirement of unclear etiology.  Left radial artery thrombus (presumed secondary to arterial line) with thrombectomy under local anesthesia and MAC on 7/2.       Recommendations today:   - Bipap for CO2 retention/acidosis at night and as needed during the day depending on VBG results  - Monitor VBGs  - Sputum airway cultures with stenotrophomonas and Strep pneumo. Continue ceftaz.  -Continue heparin until this evening for recent thrombectomy then switch to aspirin.  Reinitiate prophylactic heparin.  -Echocardiogram to assess persistent pressor requirements.  - Tacrolimus 8.6, changed to 4 mg twice daily by feeding tube starting tomorrow morning.  (Ordered)     S/p bilateral sequential lung transplant (BSLT) for end stage COPD:  Acute on chronic hypoxic respiratory failure: No evidence of PGD post operatively, Extubated to 2L NC on 6/30.  Ogoing low dose pressor requirement. Progressive CO2 retention on 7/2, improving but not resolved.  - 7/3 chest x-ray, reviewed by me, increased interstitial markings, improved from previous  -VBG 7.26/78  (yesterday 7.20/79)  - CO2 retention but oxygenating well and no evidence of resp distress. Likely reflects preop/chronic CO2 retention. May take some time to re-equilibrate. Recommend BiPAP at night and through the day as needed depending on VBG.  Minimize sedation. Reduce supplemental O2 to keep SaO2 93-96%. Monitor VBGs  - Nebs: levalbuterol and Mucomyst QID   -  Aggressive pulmonary toilet with chest physiotherapy QID (addition of Aerobika and incentive spirometry once extubated)  - DSA on 7/5 (ordered) then one month post-transplant  - Ammonia monitoring every 48 hours x 3 weeks (screening for hyperammonemia post-lung transplant)  - Bronch 6/29 with patent airways, no significant ischemic reperfusion injury, no significant edema, occasional mucous plugs in lower lobes bilaterally but removed upon therapeutic suctioning.   - PVTS catheters placed 6/29 but removed on 7/2 due to the requirement for heparinization for radial artery thrombectomy.  - Chest tubes managed by surgical team  - Daily CXR  - Post-pyloric feeding tube placed by GI  - SLP consult for swallowing evaluation, no indication for NPO status post transplant from pulm perspective, would leave NJ in place until able to meet sudha counts x 3 days  - Explant pathology with end stage emphysema as expected and all lymph nodes benign.      Immunosuppression:  Induction therapy with basiliximab (and high dose IV steroid) given intraoperatively, repeating basiliximab dose on POD#4.  - Tacrolimus 1 mg SL BID.  Goal tacrolimus level 8-12. Trending daily levels  Date Tacro Level Intervention   6/29 <1.0 Continue current dose, 1 mg bid   6/30 3.0 Increase to 2 mg bid   7/1 6.9 No adjustment    7/2 9.1  continue current dose    7/3  8.6  switch to tacrolimus by feeding tube 4 mg twice daily starting tomorrow (7/4) morning                  - MMF 1500 mg BID  - Methylprednisolone 125 mg x 3 doses completed. Prednisolone BID with taper per lung transplant protocol:  Date AM dose (mg) PM dose (mg)   6/30 17.5 17.5   7/7 15 15   7/14 15 12.5   7/21 12.5 12.5   8/4 12.5 10   8/18 10 10   9/15 10 7.5   10/13 7.5 7.5   11/10 7.5 5   12/8 5 5   1/5/23 5 2.5      Prophylaxis:   - Bactrim for PJP ppx deferred initially post-op   - VGCV for CMV ppx (as below to start on 7/6)  - Nystatin for oral candidiasis ppx, 6 month course  - See  below for serologies and viral ppx:    Donor Recipient Intervention   CMV status + + Valganciclovir POD #8-90   EBV status + + EBV check monthly   HSV status N/A + No Acyclovir prophylaxis      Primary graft dysfunction (per [facility]T guidelines):  See chart below:    POD #0  (~0 hours) POD #1  (~24 hours)   Date 6/29/22 6/30/22   Time 0535 0356   Intubated Y Y   PaO2 163 184   FiO2 40% 40   P/F Ratio 408 460   PGD Grade   (0=mild, 3=severe) 0 0   ECMO N N   Inhaled NO/Flolan N N         BACILIO: Creatinine rising, likely multifactorial including bactrim and possibly volume depletion.   - Creatinine 1.80 (1.43)  - bactrim discontinued (see below)  - Gentle IV hydration     Left hand ischemia:  Radial artery thrombosis identified on duplex Doppler.  Thrombectomy under local anesthesia and MAC successful on 7/2.  High intensity heparin until this evening then switch to aspirin daily and return to prophylactic heparin.        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   - AFB to be sent on all future bronchs, last on 6/29  - IgG at one month 7/29 6/30 blood culture negative to date  6/29 respiratory cultures negative to date  6/28 respiratory culture with Stenotrophomonas maltophilia and Streptococcus pneumoniae        Stenotrophomonas Maltophilia: Noted in right explanted lung washings.   -  Sensitivities reviewed. Stopprf treatment bactrim. Continue ceftazidime X 14d     H/O Hep C: C: Diagnosed in 1980s, 2 mos of treatment, quant negative since 10/2017, last positive 2/20/17 ([phone]).Glenis positive on 6/2021 with negative HCV PCR. Hx of remote ETOH abuse. MR Elastography 4/27/21 with hepatology review and consult without any concerns post transplant.   6/29 HIV RNA negative  Hepatitis B DNA negative  Hepatitis C RNA negative  Hepatitis C antibody positive (old)        History of steroid induced hyperglycemia: Well controlled in outpatient.  Management by primary team currently on insulin gtt, may need endo consult prior to discharge.      We appreciate the excellent care provided by the CVTS and CVICU teams.  Recommendations communicated via in person rounding and this note.  Will continue to follow along closely, please do not hesitate to call with any questions or concerns.        Ajay Castillo MD  [phone]                Interval History:   The patient returned to the OR yesterday (7/2) for left radial artery thrombectomy performed under local anesthesia with MAC.  Tolerated well.  Hand numbness resolved.    Breathing is comfortable at rest, prefers nasal cannula to BiPAP.  Rare cough.  No sputum.  Incisional pain adequately controlled with current medication.           Review of Systems:   C: NEGATIVE for fever, chills  INTEGUMENTARY/SKIN: no rash or obvious new lesions  ENT/MOUTH: Mild sore throat, no new sinus pain or nasal drainage  RESP: see interval history  CV: NEGATIVE for chest pain, palpitations or peripheral edema  GI: no nausea, vomiting, change in stools  : no dysuria  MUSCULOSKELETAL: no myalgias, arthralgias            Medications:       acetaminophen  975 mg Oral Q8H     acetylcysteine  2 mL Nebulization 4x Daily     [START ON 7/6/2022] calcium carbonate 600 mg-vitamin D 400 units  1 tablet Oral BID w/meals     cefTAZidime  2 g Intravenous Q12H     gabapentin  100 mg Oral TID     insulin aspart  1-12 Units Subcutaneous Q4H     levalbuterol  1.25 mg Nebulization 4x Daily     magnesium hydroxide  30 mL Oral or Feeding Tube Once     multivitamins w/minerals  15 mL Per Feeding Tube Daily     mycophenolate  1,500 mg Oral BID    Or     mycophenolate  1,500 mg Oral or NG Tube BID     nystatin  1,000,000 Units Swish & Swallow 4x Daily     pantoprazole  40 mg Oral or Feeding Tube Daily     polyethylene glycol  17 g Oral BID     prednisoLONE  17.5 mg Oral or NG Tube BID     protein modular  1 packet Per Feeding Tube Daily     senna-docusate   2 tablet Oral BID     sodium chloride (PF)  3 mL Intracatheter Q8H     tacrolimus  2 mg Sublingual BID IS     [START ON 7/6/2022] valGANciclovir  900 mg Oral or NG Tube Daily     - MEDICATION INSTRUCTIONS -, acetaminophen, bisacodyl, dextrose, dextrose, glucose **OR** dextrose **OR** glucagon, HYDROmorphone **OR** HYDROmorphone, hydrOXYzine **OR** hydrOXYzine, lactated ringers, lidocaine 4%, lidocaine (buffered or not buffered), magnesium hydroxide, naloxone **OR** naloxone **OR** naloxone **OR** naloxone, ondansetron **OR** ondansetron, oxyCODONE **OR** oxyCODONE, prochlorperazine **OR** prochlorperazine, sodium chloride (PF)         Physical Exam:   Temp:  [98  F (36.7  C)-98.7  F (37.1  C)] 98.2  F (36.8  C)  Pulse:  [] 96  Resp:  [10-41] 16  BP: ()/(43-71) 100/55  FiO2 (%):  [30 %] 30 %  SpO2:  [88 %-100 %] 100 %    Intake/Output Summary (Last 24 hours) at 7/3/2022 0802  Last data filed at 7/3/2022 0700  Gross per 24 hour   Intake 2374.88 ml   Output 1533 ml   Net 841.88 ml     Constitutional:   Awake, alert and in no apparent distress     Eyes:   Nonicteric, PERRL     ENT:    oral mucosa moist without lesions     Neck:   Supple without supraclavicular or cervical lymphadenopathy     Lungs:   Decreased air flow.  No crackles. No rhonchi.  Scattered wheezes.     Cardiovascular:   Normal S1 and S2.  RRR.  2/6 systolic murmur. No gallop. No rub.     Abdomen:   NABS, soft, nondistended, nontender.  No HSM.     Musculoskeletal:   1+ edema     Neurologic:   Alert and conversant.     Skin:   Warm, dry.  No rash on limited exam.             Data:   All laboratory and imaging data reviewed.    Results for orders placed or performed during the hospital encounter of 06/28/22 (from the past 24 hour(s))   Glucose by meter   Result Value Ref Range    GLUCOSE BY METER POCT 177 (H) 70 - 99 mg/dL   Lactic acid whole blood   Result Value Ref Range    Lactic Acid 0.5 (L) 0.7 - 2.0 mmol/L   Blood gas venous with  oxyhemoglobin   Result Value Ref Range    pH Venous 7.22 (L) 7.32 - 7.43    pCO2 Venous 78 (HH) 40 - 50 mm Hg    pO2 Venous 42 25 - 47 mm Hg    Bicarbonate Venous 32 (H) 21 - 28 mmol/L    FIO2 30     Oxyhemoglobin Venous 74 70 - 75 %    Base Excess/Deficit (+/-) 2.9 (H) -7.7 - 1.9 mmol/L   US Upper Ext Arterial Duplex Left Port   Result Value Ref Range    Radiologist flags Occlusion of the radial artery and distal ulnar (Urgent)     Narrative    Duplex Doppler ultrasound assessment of the left upper extremity  7/2/2022 10:44 AM    Clinical information: Had left radial arterial line removed 7/2,  worsening paresthesia and weakness    Ordering provider:    Technique: B-mode (grayscale) and duplex Doppler ultrasound of the  upper extremity arteries. Velocity measurements obtained with angle  correction at or less than 60 degrees.    Findings:     Peak systolic velocities:    Left Upper Extremity Arteries:    Subclavian: 132 cm/sec    Axillary artery: 126 cm/sec    Brachial proximal: 135 cm/sec   Brachial mid: 168 cm/sec  Brachial antecubital: 145 cm/sec    Occlusive thrombus throughout the visualized radial artery.    Ulnar artery origin: 170 cm/sec  Ulnar artery proximal: 86 cm/sec  Ulnar artery mid forearm: 68 cm/sec  Unable to detect Doppler flow within the ulnar artery at the level of  the wrist.      Impression    Impression:  1. Occlusive thrombus throughout the extent of the visualized radial  artery.  2. Diminished arterial flow within the distal ulnar artery. No Doppler  flow is detected within the ulnar artery at the level of the wrist.  3. The subclavian, axillary and brachial arteries are patent with  normal Doppler flow.    [Urgent Result: Occlusion of the radial artery and distal ulnar  artery]    Finding was identified on 7/2/2022 12:18 PM.     Dr. Montelongo was contacted by Dr. Zepeda at 7/2/2022 12:37 PM and  verbalized understanding of the urgent finding.      I have personally reviewed the  examination and initial interpretation  and I agree with the findings.    PAULINE PORRAS MD         SYSTEM ID:  [id number]   CBC with platelets   Result Value Ref Range    WBC Count 13.7 (H) 4.0 - 11.0 10e3/uL    RBC Count 3.02 (L) 3.80 - 5.20 10e6/uL    Hemoglobin 9.2 (L) 11.7 - 15.7 g/dL    Hematocrit 30.7 (L) 35.0 - 47.0 %     (H) 78 - 100 fL    MCH 30.5 26.5 - 33.0 pg    MCHC 30.0 (L) 31.5 - 36.5 g/dL    RDW 15.6 (H) 10.0 - 15.0 %    Platelet Count 120 (L) 150 - 450 10e3/uL   Glucose by meter   Result Value Ref Range    GLUCOSE BY METER POCT 242 (H) 70 - 99 mg/dL   ABO/Rh type and screen    Narrative    The following orders were created for panel order ABO/Rh type and screen.  Procedure                               Abnormality         Status                     ---------                               -----------         ------                     Adult Type and Screen[[id number]]                            Final result                 Please view results for these tests on the individual orders.   Adult Type and Screen   Result Value Ref Range    ABO/RH(D) O POS     Antibody Screen Negative Negative    SPECIMEN EXPIRATION DATE [id number]    Basic metabolic panel   Result Value Ref Range    Creatinine 1.56 (H) 0.51 - 0.95 mg/dL    Sodium 143 136 - 145 mmol/L    Potassium 4.1 3.4 - 5.3 mmol/L    Urea Nitrogen 44.6 (H) 8.0 - 23.0 mg/dL    Chloride 109 (H) 98 - 107 mmol/L    Carbon Dioxide (CO2) 28 22 - 29 mmol/L    Anion Gap 6 (L) 7 - 15 mmol/L    Glucose 113 (H) 70 - 99 mg/dL    GFR Estimate 38 (L) >60 mL/min/1.73m2    Calcium 7.8 (L) 8.8 - 10.2 mg/dL   Blood gas venous with oxyhemoglobin   Result Value Ref Range    pH Venous 7.27 (L) 7.32 - 7.43    pCO2 Venous 72 (H) 40 - 50 mm Hg    pO2 Venous 44 25 - 47 mm Hg    Bicarbonate Venous 33 (H) 21 - 28 mmol/L    FIO2 30     Oxyhemoglobin Venous 78 (H) 70 - 75 %    Base Excess/Deficit (+/-) 5.1 (H) -7.7 - 1.9 mmol/L   Magnesium   Result Value Ref Range     Magnesium 2.6 (H) 1.7 - 2.3 mg/dL   Phosphorus   Result Value Ref Range    Phosphorus 3.2 2.5 - 4.5 mg/dL   Glucose by meter   Result Value Ref Range    GLUCOSE BY METER POCT 114 (H) 70 - 99 mg/dL   Heparin Unfractionated Anti Xa Level   Result Value Ref Range    Anti Xa Unfractionated Heparin >1.10 (HH) For Reference Range, See Comment IU/mL    Narrative    Therapeutic Range: UFH: 0.25-0.50 IU/mL for low intensity dosing,  0.30-0.70 IU/mL for high intensity dosing DVT and PE.  This test is not validated for other direct factor X inhibitors (e.g. rivaroxaban, apixaban, edoxaban, betrixaban, fondaparinux) and should not be used for monitoring of other medications.   Glucose by meter   Result Value Ref Range    GLUCOSE BY METER POCT 165 (H) 70 - 99 mg/dL   Glucose by meter   Result Value Ref Range    GLUCOSE BY METER POCT 236 (H) 70 - 99 mg/dL   XR Chest Port 1 View    Narrative    XR CHEST PORT 1 VIEW  7/3/2022 1:39 AM      HISTORY: Post-Op Lung    COMPARISON: 7/2/2022    FINDINGS:   Single AP view of the chest. Postoperative changes of bilateral lung  transplantation. Feeding tube courses beyond the field-of-view. Stable  right IJ CVC sheath. Stable bilateral chest tubes and mediastinal  drain. Dense enteric contrast overlying the stomach. Stable  mediastinum. Slightly decreased conspicuity of a trace right apical  pneumothorax. No significant right pleural effusion. Possible trace  left pleural effusion. Decreased perihilar and bibasilar interstitial  and airspace opacities.      Impression    IMPRESSION:   1. Stable support devices.  2. Slightly decreased conspicuity of a trace right apical  pneumothorax.  3. Decreased perihilar and bibasilar opacities with possible trace  left pleural effusion.    I have personally reviewed the examination and initial interpretation  and I agree with the findings.    CECI REGAN DO         SYSTEM ID:  [id number]   Heparin Unfractionated Anti Xa Level   Result Value Ref Range     Anti Xa Unfractionated Heparin >1.10 (HH) For Reference Range, See Comment IU/mL    Narrative    Therapeutic Range: UFH: 0.25-0.50 IU/mL for low intensity dosing,  0.30-0.70 IU/mL for high intensity dosing DVT and PE.  This test is not validated for other direct factor X inhibitors (e.g. rivaroxaban, apixaban, edoxaban, betrixaban, fondaparinux) and should not be used for monitoring of other medications.   Lactic acid whole blood   Result Value Ref Range    Lactic Acid 0.4 (L) 0.7 - 2.0 mmol/L   Basic metabolic panel   Result Value Ref Range    Creatinine 1.80 (H) 0.51 - 0.95 mg/dL    Sodium 142 136 - 145 mmol/L    Potassium 4.2 3.4 - 5.3 mmol/L    Urea Nitrogen 49.7 (H) 8.0 - 23.0 mg/dL    Chloride 107 98 - 107 mmol/L    Carbon Dioxide (CO2) 32 (H) 22 - 29 mmol/L    Anion Gap 3 (L) 7 - 15 mmol/L    Glucose 238 (H) 70 - 99 mg/dL    GFR Estimate 32 (L) >60 mL/min/1.73m2    Calcium 7.6 (L) 8.8 - 10.2 mg/dL   Magnesium   Result Value Ref Range    Magnesium 2.9 (H) 1.7 - 2.3 mg/dL   Phosphorus   Result Value Ref Range    Phosphorus 3.0 2.5 - 4.5 mg/dL   CBC with platelets differential    Narrative    The following orders were created for panel order CBC with platelets differential.  Procedure                               Abnormality         Status                     ---------                               -----------         ------                     CBC with platelets and d...[[id number]]  Abnormal            Final result                 Please view results for these tests on the individual orders.   Blood gas venous with oxyhemoglobin   Result Value Ref Range    pH Venous 7.26 (L) 7.32 - 7.43    pCO2 Venous 78 (HH) 40 - 50 mm Hg    pO2 Venous 45 25 - 47 mm Hg    Bicarbonate Venous 35 (H) 21 - 28 mmol/L    FIO2 30     Oxyhemoglobin Venous 78 (H) 70 - 75 %    Base Excess/Deficit (+/-) 6.7 (H) -7.7 - 1.9 mmol/L   CBC with platelets and differential   Result Value Ref Range    WBC Count 13.9 (H) 4.0 - 11.0 10e3/uL     RBC Count 2.84 (L) 3.80 - 5.20 10e6/uL    Hemoglobin 8.7 (L) 11.7 - 15.7 g/dL    Hematocrit 28.8 (L) 35.0 - 47.0 %     (H) 78 - 100 fL    MCH 30.6 26.5 - 33.0 pg    MCHC 30.2 (L) 31.5 - 36.5 g/dL    RDW 15.6 (H) 10.0 - 15.0 %    Platelet Count 151 150 - 450 10e3/uL    % Neutrophils 90 %    % Lymphocytes 3 %    % Monocytes 6 %    % Eosinophils 0 %    % Basophils 0 %    % Immature Granulocytes 1 %    NRBCs per 100 WBC 0 <1 /100    Absolute Neutrophils 12.5 (H) 1.6 - 8.3 10e3/uL    Absolute Lymphocytes 0.5 (L) 0.8 - 5.3 10e3/uL    Absolute Monocytes 0.8 0.0 - 1.3 10e3/uL    Absolute Eosinophils 0.0 0.0 - 0.7 10e3/uL    Absolute Basophils 0.0 0.0 - 0.2 10e3/uL    Absolute Immature Granulocytes 0.1 <=0.4 10e3/uL    Absolute NRBCs 0.0 10e3/uL   Glucose by meter   Result Value Ref Range    GLUCOSE BY METER POCT 220 (H) 70 - 99 mg/dL

## 2022-07-03 NOTE — PROGRESS NOTES
VASCULAR SURGERY PROGRESS NOTE    Ms. Rodriguez is a 61yo woman with left upper extremity acute limb ischemia now POD1 s/p left radial thrombectomy.    Remains on norepi. Pain controlled. Improving sensation in left hand. Reports no numbness or tingling in the hand and intact motor function.    Vitals reviewed.    On exam, she is resting in bed with bipap mask in place. Left hand is warm with brisk capillary refill. 2+ left radial pulse. Incision dressed with dermabond and scant serosanginous strikethrough on overlying bandage. No active bleeding. Sensation to light touch intact in the hand. 5/5  strength bilaterally.    Labs reviewed.    Ms. Rodriguez is a 61yo woman with left upper extremity acute limb ischemia now POD1 s/p left radial thrombectomy, recovering on expected course    - continue heparin 24-48h post op  - aspirin 81mg daily indefinitely  - okay to decrease frequency of vascular checks per unit routine  - follow up with vascular surgery in 2-4 weeks (orders placed)  - vascular surgery will sign off at this time, please page with questions    Charisse Jackson MD  07/03/22  7:55 AM

## 2022-07-04 ENCOUNTER — APPOINTMENT (OUTPATIENT)
Dept: OCCUPATIONAL THERAPY | Facility: CLINIC | Age: 62
DRG: 007 | End: 2022-07-04
Attending: THORACIC SURGERY (CARDIOTHORACIC VASCULAR SURGERY)
Payer: MEDICARE

## 2022-07-04 ENCOUNTER — APPOINTMENT (OUTPATIENT)
Dept: GENERAL RADIOLOGY | Facility: CLINIC | Age: 62
DRG: 007 | End: 2022-07-04
Attending: THORACIC SURGERY (CARDIOTHORACIC VASCULAR SURGERY)
Payer: MEDICARE

## 2022-07-04 LAB
ALBUMIN SERPL BCG-MCNC: 2.6 G/DL (ref 3.5–5.2)
ALP SERPL-CCNC: 60 U/L (ref 35–104)
ALT SERPL W P-5'-P-CCNC: 24 U/L (ref 10–35)
AMMONIA PLAS-SCNC: 16 UMOL/L (ref 11–51)
AST SERPL W P-5'-P-CCNC: 20 U/L (ref 10–35)
BASE EXCESS BLDV CALC-SCNC: 2.7 MMOL/L (ref -7.7–1.9)
BASE EXCESS BLDV CALC-SCNC: 4.5 MMOL/L (ref -7.7–1.9)
BASE EXCESS BLDV CALC-SCNC: 4.5 MMOL/L (ref -7.7–1.9)
BASOPHILS # BLD AUTO: 0 10E3/UL (ref 0–0.2)
BASOPHILS NFR BLD AUTO: 0 %
BILIRUB DIRECT SERPL-MCNC: <0.2 MG/DL (ref 0–0.3)
BILIRUB SERPL-MCNC: <0.2 MG/DL
EOSINOPHIL # BLD AUTO: 0 10E3/UL (ref 0–0.7)
EOSINOPHIL NFR BLD AUTO: 0 %
ERYTHROCYTE [DISTWIDTH] IN BLOOD BY AUTOMATED COUNT: 15.8 % (ref 10–15)
GLUCOSE BLDC GLUCOMTR-MCNC: 179 MG/DL (ref 70–99)
GLUCOSE BLDC GLUCOMTR-MCNC: 209 MG/DL (ref 70–99)
GLUCOSE BLDC GLUCOMTR-MCNC: 210 MG/DL (ref 70–99)
GLUCOSE BLDC GLUCOMTR-MCNC: 231 MG/DL (ref 70–99)
GLUCOSE BLDC GLUCOMTR-MCNC: 231 MG/DL (ref 70–99)
HCO3 BLDV-SCNC: 32 MMOL/L (ref 21–28)
HCO3 BLDV-SCNC: 33 MMOL/L (ref 21–28)
HCO3 BLDV-SCNC: 33 MMOL/L (ref 21–28)
HCT VFR BLD AUTO: 28.8 % (ref 35–47)
HGB BLD-MCNC: 8.6 G/DL (ref 11.7–15.7)
IMM GRANULOCYTES # BLD: 0.3 10E3/UL
IMM GRANULOCYTES NFR BLD: 2 %
LACTATE SERPL-SCNC: 1.3 MMOL/L (ref 0.7–2)
LYMPHOCYTES # BLD AUTO: 0.5 10E3/UL (ref 0.8–5.3)
LYMPHOCYTES NFR BLD AUTO: 4 %
MCH RBC QN AUTO: 31.2 PG (ref 26.5–33)
MCHC RBC AUTO-ENTMCNC: 29.9 G/DL (ref 31.5–36.5)
MCV RBC AUTO: 104 FL (ref 78–100)
MONOCYTES # BLD AUTO: 0.6 10E3/UL (ref 0–1.3)
MONOCYTES NFR BLD AUTO: 5 %
NEUTROPHILS # BLD AUTO: 10.8 10E3/UL (ref 1.6–8.3)
NEUTROPHILS NFR BLD AUTO: 89 %
NRBC # BLD AUTO: 0 10E3/UL
NRBC BLD AUTO-RTO: 0 /100
O2/TOTAL GAS SETTING VFR VENT: 21 %
O2/TOTAL GAS SETTING VFR VENT: 25 %
O2/TOTAL GAS SETTING VFR VENT: 28 %
OXYHGB MFR BLDV: 72 % (ref 70–75)
OXYHGB MFR BLDV: 75 % (ref 70–75)
OXYHGB MFR BLDV: 78 % (ref 70–75)
PCO2 BLDV: 75 MM HG (ref 40–50)
PCO2 BLDV: 77 MM HG (ref 40–50)
PCO2 BLDV: 88 MM HG (ref 40–50)
PH BLDV: 7.18 [PH] (ref 7.32–7.43)
PH BLDV: 7.24 [PH] (ref 7.32–7.43)
PH BLDV: 7.25 [PH] (ref 7.32–7.43)
PLATELET # BLD AUTO: 173 10E3/UL (ref 150–450)
PO2 BLDV: 38 MM HG (ref 25–47)
PO2 BLDV: 41 MM HG (ref 25–47)
PO2 BLDV: 47 MM HG (ref 25–47)
PROT SERPL-MCNC: 4.4 G/DL (ref 6.4–8.3)
RBC # BLD AUTO: 2.76 10E6/UL (ref 3.8–5.2)
TACROLIMUS BLD-MCNC: 10.2 UG/L (ref 5–15)
TME LAST DOSE: NORMAL H
TME LAST DOSE: NORMAL H
WBC # BLD AUTO: 12.2 10E3/UL (ref 4–11)

## 2022-07-04 PROCEDURE — 250N000012 HC RX MED GY IP 250 OP 636 PS 637: Performed by: INTERNAL MEDICINE

## 2022-07-04 PROCEDURE — 999N000157 HC STATISTIC RCP TIME EA 10 MIN

## 2022-07-04 PROCEDURE — 94640 AIRWAY INHALATION TREATMENT: CPT

## 2022-07-04 PROCEDURE — 250N000012 HC RX MED GY IP 250 OP 636 PS 637

## 2022-07-04 PROCEDURE — 250N000009 HC RX 250: Performed by: SURGERY

## 2022-07-04 PROCEDURE — 200N000002 HC R&B ICU UMMC

## 2022-07-04 PROCEDURE — 82805 BLOOD GASES W/O2 SATURATION: CPT | Performed by: STUDENT IN AN ORGANIZED HEALTH CARE EDUCATION/TRAINING PROGRAM

## 2022-07-04 PROCEDURE — 36415 COLL VENOUS BLD VENIPUNCTURE: CPT | Performed by: INTERNAL MEDICINE

## 2022-07-04 PROCEDURE — 258N000003 HC RX IP 258 OP 636: Performed by: STUDENT IN AN ORGANIZED HEALTH CARE EDUCATION/TRAINING PROGRAM

## 2022-07-04 PROCEDURE — 94660 CPAP INITIATION&MGMT: CPT

## 2022-07-04 PROCEDURE — 80076 HEPATIC FUNCTION PANEL: CPT | Performed by: SURGERY

## 2022-07-04 PROCEDURE — 97166 OT EVAL MOD COMPLEX 45 MIN: CPT | Mod: GO | Performed by: OCCUPATIONAL THERAPIST

## 2022-07-04 PROCEDURE — 97530 THERAPEUTIC ACTIVITIES: CPT | Mod: GO | Performed by: OCCUPATIONAL THERAPIST

## 2022-07-04 PROCEDURE — 250N000013 HC RX MED GY IP 250 OP 250 PS 637: Performed by: SURGERY

## 2022-07-04 PROCEDURE — 99233 SBSQ HOSP IP/OBS HIGH 50: CPT | Mod: 24 | Performed by: INTERNAL MEDICINE

## 2022-07-04 PROCEDURE — 80197 ASSAY OF TACROLIMUS: CPT | Performed by: SURGERY

## 2022-07-04 PROCEDURE — 250N000012 HC RX MED GY IP 250 OP 636 PS 637: Performed by: SURGERY

## 2022-07-04 PROCEDURE — 71045 X-RAY EXAM CHEST 1 VIEW: CPT | Mod: 26 | Performed by: RADIOLOGY

## 2022-07-04 PROCEDURE — 87040 BLOOD CULTURE FOR BACTERIA: CPT | Performed by: STUDENT IN AN ORGANIZED HEALTH CARE EDUCATION/TRAINING PROGRAM

## 2022-07-04 PROCEDURE — 82140 ASSAY OF AMMONIA: CPT | Performed by: SURGERY

## 2022-07-04 PROCEDURE — 83605 ASSAY OF LACTIC ACID: CPT | Performed by: ANESTHESIOLOGY

## 2022-07-04 PROCEDURE — 97535 SELF CARE MNGMENT TRAINING: CPT | Mod: GO | Performed by: OCCUPATIONAL THERAPIST

## 2022-07-04 PROCEDURE — 94668 MNPJ CHEST WALL SBSQ: CPT

## 2022-07-04 PROCEDURE — 94640 AIRWAY INHALATION TREATMENT: CPT | Mod: 76

## 2022-07-04 PROCEDURE — 99291 CRITICAL CARE FIRST HOUR: CPT | Mod: 24 | Performed by: STUDENT IN AN ORGANIZED HEALTH CARE EDUCATION/TRAINING PROGRAM

## 2022-07-04 PROCEDURE — 71045 X-RAY EXAM CHEST 1 VIEW: CPT

## 2022-07-04 PROCEDURE — 97110 THERAPEUTIC EXERCISES: CPT | Mod: GO | Performed by: OCCUPATIONAL THERAPIST

## 2022-07-04 PROCEDURE — 250N000011 HC RX IP 250 OP 636

## 2022-07-04 PROCEDURE — 87040 BLOOD CULTURE FOR BACTERIA: CPT | Performed by: INTERNAL MEDICINE

## 2022-07-04 PROCEDURE — 250N000011 HC RX IP 250 OP 636: Performed by: SURGERY

## 2022-07-04 PROCEDURE — 250N000013 HC RX MED GY IP 250 OP 250 PS 637

## 2022-07-04 PROCEDURE — 85025 COMPLETE CBC W/AUTO DIFF WBC: CPT | Performed by: SURGERY

## 2022-07-04 PROCEDURE — 250N000013 HC RX MED GY IP 250 OP 250 PS 637: Performed by: ANESTHESIOLOGY

## 2022-07-04 PROCEDURE — 250N000009 HC RX 250: Performed by: STUDENT IN AN ORGANIZED HEALTH CARE EDUCATION/TRAINING PROGRAM

## 2022-07-04 RX ORDER — CEFTAZIDIME 2 G/1
2 INJECTION, POWDER, FOR SOLUTION INTRAVENOUS EVERY 24 HOURS
Status: DISCONTINUED | OUTPATIENT
Start: 2022-07-05 | End: 2022-07-10

## 2022-07-04 RX ADMIN — HEPARIN SODIUM 5000 UNITS: 5000 INJECTION, SOLUTION INTRAVENOUS; SUBCUTANEOUS at 06:19

## 2022-07-04 RX ADMIN — INSULIN ASPART 2 UNITS: 100 INJECTION, SOLUTION INTRAVENOUS; SUBCUTANEOUS at 19:47

## 2022-07-04 RX ADMIN — MYCOPHENOLATE MOFETIL 1500 MG: 250 CAPSULE ORAL at 19:34

## 2022-07-04 RX ADMIN — ACETAMINOPHEN 975 MG: 325 TABLET, FILM COATED ORAL at 21:43

## 2022-07-04 RX ADMIN — INSULIN ASPART 4 UNITS: 100 INJECTION, SOLUTION INTRAVENOUS; SUBCUTANEOUS at 12:20

## 2022-07-04 RX ADMIN — NYSTATIN 1000000 UNITS: 100000 SUSPENSION ORAL at 08:11

## 2022-07-04 RX ADMIN — HEPARIN SODIUM 5000 UNITS: 5000 INJECTION, SOLUTION INTRAVENOUS; SUBCUTANEOUS at 21:43

## 2022-07-04 RX ADMIN — ACETYLCYSTEINE 2 ML: 200 SOLUTION ORAL; RESPIRATORY (INHALATION) at 08:24

## 2022-07-04 RX ADMIN — HYDROMORPHONE HYDROCHLORIDE 0.2 MG: 0.2 INJECTION, SOLUTION INTRAMUSCULAR; INTRAVENOUS; SUBCUTANEOUS at 21:50

## 2022-07-04 RX ADMIN — ACETAMINOPHEN 975 MG: 325 TABLET, FILM COATED ORAL at 06:19

## 2022-07-04 RX ADMIN — PREDNISOLONE 17.5 MG: 15 SOLUTION ORAL at 19:35

## 2022-07-04 RX ADMIN — LEVALBUTEROL HYDROCHLORIDE 1.25 MG: 1.25 SOLUTION RESPIRATORY (INHALATION) at 13:32

## 2022-07-04 RX ADMIN — MULTIVITAMIN 15 ML: LIQUID ORAL at 08:11

## 2022-07-04 RX ADMIN — NYSTATIN 1000000 UNITS: 100000 SUSPENSION ORAL at 19:34

## 2022-07-04 RX ADMIN — INSULIN ASPART 2 UNITS: 100 INJECTION, SOLUTION INTRAVENOUS; SUBCUTANEOUS at 23:53

## 2022-07-04 RX ADMIN — METHOCARBAMOL 500 MG: 500 TABLET ORAL at 19:34

## 2022-07-04 RX ADMIN — LEVALBUTEROL HYDROCHLORIDE 1.25 MG: 1.25 SOLUTION RESPIRATORY (INHALATION) at 20:52

## 2022-07-04 RX ADMIN — NYSTATIN 1000000 UNITS: 100000 SUSPENSION ORAL at 15:48

## 2022-07-04 RX ADMIN — INSULIN GLARGINE 10 UNITS: 100 INJECTION, SOLUTION SUBCUTANEOUS at 08:20

## 2022-07-04 RX ADMIN — METHOCARBAMOL 500 MG: 500 TABLET ORAL at 15:48

## 2022-07-04 RX ADMIN — PREDNISOLONE 17.5 MG: 15 SOLUTION ORAL at 08:11

## 2022-07-04 RX ADMIN — SODIUM CHLORIDE, POTASSIUM CHLORIDE, SODIUM LACTATE AND CALCIUM CHLORIDE 500 ML: 600; 310; 30; 20 INJECTION, SOLUTION INTRAVENOUS at 10:21

## 2022-07-04 RX ADMIN — METHOCARBAMOL 500 MG: 500 TABLET ORAL at 12:03

## 2022-07-04 RX ADMIN — TACROLIMUS 4 MG: 5 CAPSULE ORAL at 08:10

## 2022-07-04 RX ADMIN — NYSTATIN 1000000 UNITS: 100000 SUSPENSION ORAL at 12:03

## 2022-07-04 RX ADMIN — INSULIN ASPART 3 UNITS: 100 INJECTION, SOLUTION INTRAVENOUS; SUBCUTANEOUS at 15:53

## 2022-07-04 RX ADMIN — HYDROMORPHONE HYDROCHLORIDE 0.2 MG: 0.2 INJECTION, SOLUTION INTRAMUSCULAR; INTRAVENOUS; SUBCUTANEOUS at 06:18

## 2022-07-04 RX ADMIN — MYCOPHENOLATE MOFETIL 1500 MG: 200 POWDER, FOR SUSPENSION ORAL at 08:10

## 2022-07-04 RX ADMIN — MIDODRINE HYDROCHLORIDE 10 MG: 5 TABLET ORAL at 18:07

## 2022-07-04 RX ADMIN — ACETYLCYSTEINE 2 ML: 200 SOLUTION ORAL; RESPIRATORY (INHALATION) at 20:52

## 2022-07-04 RX ADMIN — ACETYLCYSTEINE 2 ML: 200 SOLUTION ORAL; RESPIRATORY (INHALATION) at 13:32

## 2022-07-04 RX ADMIN — OXYCODONE HYDROCHLORIDE 10 MG: 10 TABLET ORAL at 19:41

## 2022-07-04 RX ADMIN — METHOCARBAMOL 500 MG: 500 TABLET ORAL at 08:10

## 2022-07-04 RX ADMIN — MIDODRINE HYDROCHLORIDE 10 MG: 5 TABLET ORAL at 12:03

## 2022-07-04 RX ADMIN — GABAPENTIN 100 MG: 100 CAPSULE ORAL at 08:10

## 2022-07-04 RX ADMIN — GABAPENTIN 100 MG: 100 CAPSULE ORAL at 14:05

## 2022-07-04 RX ADMIN — MIDODRINE HYDROCHLORIDE 10 MG: 5 TABLET ORAL at 08:10

## 2022-07-04 RX ADMIN — TACROLIMUS 4 MG: 5 CAPSULE ORAL at 18:07

## 2022-07-04 RX ADMIN — Medication 0.14 MCG/KG/MIN: at 05:26

## 2022-07-04 RX ADMIN — Medication 1 PACKET: at 12:02

## 2022-07-04 RX ADMIN — INSULIN ASPART 4 UNITS: 100 INJECTION, SOLUTION INTRAVENOUS; SUBCUTANEOUS at 03:28

## 2022-07-04 RX ADMIN — LIDOCAINE PATCH 4% 2 PATCH: 40 PATCH TOPICAL at 08:28

## 2022-07-04 RX ADMIN — HYDROMORPHONE HYDROCHLORIDE 0.2 MG: 0.2 INJECTION, SOLUTION INTRAMUSCULAR; INTRAVENOUS; SUBCUTANEOUS at 17:21

## 2022-07-04 RX ADMIN — POTASSIUM PHOSPHATE, MONOBASIC AND POTASSIUM PHOSPHATE, DIBASIC 9 MMOL: 224; 236 INJECTION, SOLUTION, CONCENTRATE INTRAVENOUS at 03:20

## 2022-07-04 RX ADMIN — GABAPENTIN 100 MG: 100 CAPSULE ORAL at 19:34

## 2022-07-04 RX ADMIN — OXYCODONE HYDROCHLORIDE 10 MG: 10 TABLET ORAL at 06:19

## 2022-07-04 RX ADMIN — HYDROMORPHONE HYDROCHLORIDE 0.2 MG: 0.2 INJECTION, SOLUTION INTRAMUSCULAR; INTRAVENOUS; SUBCUTANEOUS at 08:11

## 2022-07-04 RX ADMIN — OXYCODONE HYDROCHLORIDE 10 MG: 10 TABLET ORAL at 12:03

## 2022-07-04 RX ADMIN — HEPARIN SODIUM 5000 UNITS: 5000 INJECTION, SOLUTION INTRAVENOUS; SUBCUTANEOUS at 14:05

## 2022-07-04 RX ADMIN — INSULIN ASPART 4 UNITS: 100 INJECTION, SOLUTION INTRAVENOUS; SUBCUTANEOUS at 08:20

## 2022-07-04 RX ADMIN — ASPIRIN 81 MG CHEWABLE TABLET 81 MG: 81 TABLET CHEWABLE at 08:10

## 2022-07-04 RX ADMIN — OXYCODONE HYDROCHLORIDE 10 MG: 10 TABLET ORAL at 23:44

## 2022-07-04 RX ADMIN — CEFTAZIDIME 2 G: 2 INJECTION, POWDER, FOR SOLUTION INTRAVENOUS at 08:12

## 2022-07-04 RX ADMIN — Medication 40 MG: at 08:11

## 2022-07-04 RX ADMIN — LEVALBUTEROL HYDROCHLORIDE 1.25 MG: 1.25 SOLUTION RESPIRATORY (INHALATION) at 08:24

## 2022-07-04 RX ADMIN — OXYCODONE HYDROCHLORIDE 10 MG: 10 TABLET ORAL at 02:33

## 2022-07-04 RX ADMIN — ACETAMINOPHEN 975 MG: 325 TABLET, FILM COATED ORAL at 14:05

## 2022-07-04 RX ADMIN — OXYCODONE HYDROCHLORIDE 10 MG: 10 TABLET ORAL at 15:48

## 2022-07-04 ASSESSMENT — ACTIVITIES OF DAILY LIVING (ADL)
ADLS_ACUITY_SCORE: 32
ADLS_ACUITY_SCORE: 30

## 2022-07-04 NOTE — PLAN OF CARE
Plan of Care Reviewed With: patient, daughter     Neuro: A&Ox4. Afebrile. Pain controlled with PRN pain medication.  Resp: BiPAP 21% throughout the shift.  CV: Sinus rhythm/sinus tachycardia with frequent PACs. Norepi drip increased to maintain MAP >65.  GI/: Dong with diminished urine output (~15/hr). Rectal pouch for watery incontinent stools. NJ with tube feedings continued at goal.   Other: Heparin drip stopped at 2000. pCO2 venous remains in the 70s. Rising creatinine level.     Plan: Wean norepi as able. Increase activity. Continue with POC.    For vital signs and complete assessments, please see documentation flowsheets.

## 2022-07-04 NOTE — PROGRESS NOTES
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 Sunshine. This was complicated by left upper extremity acute limb ischemia s/p left radial thrombectomy on 7/1/22.       PLAN SUMMARY:  - Immunosuppression and prophylaxis per pulmonary transplant team  - Trend VBG's  - BiPAP (ongoing respiratory acidosis)  - Increase Lantus to 20 daily from 10 to optimize glycemic control  - Assess volume status: fluid challenge vs. Diuresis  - Wean off pressors as able; continues to require NE  - SQH  - ASA  - Pending swallow study  - Multi-modal analgesia       PLAN:  Neuro/ pain/ sedation:  Acute postoperative pain  - Monitor neurological status. Notify the MD for any acute changes in exam.  - Scheduled: acetaminophen, robaxin, gabapentin. PRN: oxycodone, dilaudid     Pulmonary:  #Postoperative ventilatory support  #Bilateral Lung Transplant  #Hx COPD  - Titrate FiO2 for SpO2 >92%  - Pulmonary hygiene: Incentive spirometer every 15- 30 minutes when awake  - Basiliximab POD #0 and #4, prednisolone, mycophenolate, tacrolimus  - Valganciclovir       Cardiovascular:  #Hx HTN  #Hx HFpEF  - Monitor hemodynamic status.   - Goal MAP >65  - Norepinephrine drip, wean as tolerated     GI care/ Nutrition:   - NJ TF @ goal  - PPI: protonix  - Continue bowel regimen: miralax; PRN moM    Renal/ Fluid Balance/ Electrolytes:   - Strict I/O, daily weights  - Avoid/limit nephrotoxins as able  - Replete lytes PRN per protocol     Endocrine:    Stress induced hyperglycemia  - Insulin gtt transitioned to high sliding scale insulin 7/2  - Lantus 20 units daily     ID/ Antibiotics:  Stress-induced leukocytosis  - Continue to monitor fever curve, WBC and inflammatory markers as appropriate  - Prophylaxis: POD #8-90 nystatin, TMP/SMX (discontined  due to BACILIO), valganciclovir  - Post-op abx: ceftazadime extended to 14 day coverage given elevated temp and WBCs  - Post-op cultures:   - Gram stain (1+ G+ cocci)   - Resp aerobic cx (4+ G- bacilli)   - Fungal cx (NGTD)   - AFB cx (NGTD)  - Blood cx sent 6/30     Heme:     Acute blood loss anemia  Acute blood loss thrombocytopenia  Left upper extremity acute limb ischemia s/p left radial thrombectomy on 7/1/22  - Transitioned from heparin gtt to SQH per vascular surgery  - Daily CBC        Prophylaxis:    - DVT: mechanical +SQH  - PPI      Lines/ tubes/ drains:  - CTs x5, RIJ, montgomery, PIV x2  - 4 Pleural, 1 med     Disposition:  - CVICU    Patient seen, findings and plan discussed with CV ICU staff, Dr. Bolden.    Danuta Chairez MD  Anesthesiology Resident, PGY-4        ====================================    SUBJECTIVE:   naeo    OBJECTIVE:   1. VITAL SIGNS:   Temp:  [97.4  F (36.3  C)-98.3  F (36.8  C)] 98.3  F (36.8  C)  Pulse:  [] 93  Resp:  [15-44] 28  BP: ()/(29-63) 104/51  FiO2 (%):  [21 %] 21 %  SpO2:  [82 %-100 %] 93 %  FiO2 (%): 21 %  Resp: 28      2. INTAKE/ OUTPUT:   I/O last 3 completed shifts:  In: 3407.86 [P.O.:1200; I.V.:707.86; NG/GT:510]  Out: 1844 [Urine:414; Stool:50; Chest Tube:1380]    3. PHYSICAL EXAMINATION:   General: lethargic; follows commands  Neuro: grossly intact  Resp: on BiPAP  CV: RRR  Abdomen: Soft, Non-distended, Non-tender  Incisions: c/d/i  Extremities: warm and well perfused    4. INVESTIGATIONS:   Arterial Blood Gases   Recent Labs   Lab 07/02/22  0241 06/30/22  1037 06/30/22  0356 06/29/22  2339   PH 7.19* 7.37 7.54* 7.48*   PCO2 77* 51* 34* 40   PO2 93 133* 184* 162*   HCO3 29* 29* 29* 29*     Complete Blood Count   Recent Labs   Lab 07/04/22  0323 07/03/22  0333 07/02/22  1118 07/02/22  0440   WBC 12.2* 13.9* 13.7* 14.1*   HGB 8.6* 8.7* 9.2* 9.7*    151 120* 117*     Basic Metabolic Panel  Recent Labs   Lab 07/04/22  0806 07/04/22  0327 07/03/22  5925  07/03/22  2231 07/03/22  0334 07/03/22  0333 07/02/22  1501 07/02/22  1456 07/02/22  0448 07/02/22  0440   NA  --   --   --  144  --  142  --  143  --  143   POTASSIUM  --   --   --  4.4  --  4.2  --  4.1  --  4.7   CHLORIDE  --   --   --  106  --  107  --  109*  --  106   CO2  --   --   --  31*  --  32*  --  28  --  28   BUN  --   --   --  59.4*  --  49.7*  --  44.6*  --  39.7*   CR  --   --   --  1.96*  --  1.80*  --  1.56*  --  1.43*   * 231* 202* 211*   < > 238*   < > 113*   < > 164*    < > = values in this interval not displayed.     Liver Function Tests  Recent Labs   Lab 07/04/22  0323 06/30/22  0355 06/29/22  2341 06/29/22  0248 06/29/22  0007 06/28/22  1258   AST 20 65* 78* 143*  --  30   ALT 24 23 27 33  --  16   ALKPHOS 60 35 40 36  --  86   BILITOTAL <0.2 0.3 0.3 0.9  --  0.4   ALBUMIN 2.6* 2.6* 2.8* 2.3*  --  5.3*   INR  --   --   --  1.33* 1.60* 0.91     Pancreatic Enzymes  No lab results found in last 7 days.  Coagulation Profile  Recent Labs   Lab 06/29/22  0248 06/29/22  0007 06/28/22  1258   INR 1.33* 1.60* 0.91   PTT 52* 32 29         5. RADIOLOGY:   Recent Results (from the past 24 hour(s))   XR Chest Port 1 View    Narrative    EXAM: XR CHEST PORT 1 VIEW  7/4/2022 1:03 AM     HISTORY:  Post-Op Lung       COMPARISON:  7/3/2022, 7/2/2022    FINDINGS  Technique: Semiupright AP view of the chest.     Devices: Right internal jugular approach central venous catheter  terminates over the mid SVC. Bilateral apical and basal chest tubes  are not appreciably changed. Clamshell sternotomy wires.    Unchanged streaky perihilar and bibasilar pulmonary opacities. Cardiac  silhouette is stable in size. Aortic arch calcifications. Remaining  trace right apical pneumothorax. No pleural effusion.       Impression    IMPRESSION:     1. Trace remaining right apical pneumothorax.  2. Unchanged perihilar/bibasilar atelectasis and/or edema.  3. Support devices stable.    I have personally reviewed the  examination and initial interpretation  and I agree with the findings.    ALVINA HARRY MD         SYSTEM ID:  [id number]       =========================================

## 2022-07-04 NOTE — PLAN OF CARE
ICU End of Shift Summary. See flowsheets for vital signs and detailed assessment.    Changes this shift:     Neuro: Alert and oriented x4, able to make needs known, call light appropriate. AMINA, moving all extremities. Pain controlled with oxycodone and dilaudid. Up to chair with assist of 2 and pivoting for about 4 hours, patient tolerated. Daughters updated via phone.    Cardiac: SR with occasional PACs. HR 80-90s. On levo gtt to maintain MAP >65, titrating down. L write incision site CDI.  L vázquez pulse present via doppler. Afebrile but blood cultures obtained.    Resp: Transitioned to 2L NC this AM but VBG resulted with pH 7.18, PCO2 88, so pt was switched back to BiPAP 18/5, now FiO2 25%. Chest tubes continue to drain, L side split into 2 atriums; total of 3 atriums. R chest tubes output ~10-60 mL/hr. L pleural output ~10-30 mL/hr. L med output 0-30 mL/hr.    GI: Regular diet, fair appetite. Pt ate some breakfast this morning, independent but weak. NJ in place, TF at goal 45 mL/hr with standard flushes. On sliding scale, lantus increased to 20 units daily.  Continues to have diarrhea. Rectal pouch removed d/t large leak.     : Dong patent and intact for strict I&Os. Poor urine output, CVTS aware. 500 mL LR bolus given with no effect. CVTS updated, no new orders.    Plan: Continue to wean down pressors.  Monitor hemodynamics and follow POC.

## 2022-07-04 NOTE — PROGRESS NOTES
Lung Transplant Consult Follow Up Note   July 4, 2022            Assessment and Plan:   Sofie Rodriguez is a 62 year old female with a PMH significant for end stage COPD, HTN, Hep C, and osteopenia as well as former methamphetamine use.  Pt. 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  Surgery on CPB, uncomplicated, lungs slightly undersized for chest. Did require some moderate volume resuscitation with low dose pressors post transplant. Extubated 6/30. Progressive CO2 retention, improved with BiPAP. Persistent low dose pressor requirement of unclear etiology.  Left radial artery thrombus (presumed secondary to arterial line) with thrombectomy under local anesthesia and MAC on 7/2.     Recommendations today:   - Continue Bipap for CO2 retention/acidosis at night and as needed during the day depending on VBG results  - Monitor VBGs  - Sputum airway cultures with stenotrophomonas and Strep pneumo. Continue ceftaz.  - Consider gentle IV hydration for ongoing hypotension.  - Tacrolimus 10.2, changed to 4 mg twice daily by feeding tube starting this (7/4)  morning. Continue close monitoring. (Ordered)     S/p bilateral sequential lung transplant (BSLT) for end stage COPD:  Acute on chronic hypoxic respiratory failure: No evidence of PGD post operatively, Extubated to 2L NC on 6/30.  Ongoing low dose pressor requirement.  PErsistent CO2 retention, slowly improving but not resolved.  - 7/4 Chest x-ray reviewed by me, no acute infiltrate and no change from previous.  -VBG 7.25/75  - CO2 retention but oxygenating well and no evidence of resp distress. Likely reflects preop/chronic CO2 retention. May take some time to re-equilibrate. Recommend BiPAP at night and through the day as needed depending on VBG.  Minimize sedation. Reduce supplemental O2 to keep SaO2 93-96%. Monitor VBGs  - Nebs: levalbuterol and Mucomyst QID   - Aggressive pulmonary toilet with chest physiotherapy QID (addition of Aerobika and incentive spirometry once  extubated)  - DSA on 7/5 (ordered) then one month post-transplant  - Ammonia monitoring every 48 hours x 3 weeks (screening for hyperammonemia post-lung transplant). Ammonia 16 (7/4)  - Bronch 6/29 with patent airways, no significant ischemic reperfusion injury, no significant edema, occasional mucous plugs in lower lobes bilaterally but removed upon therapeutic suctioning.   - PVTS catheters placed 6/29 but removed on 7/2 due to the requirement for heparinization for radial artery thrombectomy.  - Chest tubes managed by surgical team  - Daily CXR  - Post-pyloric feeding tube placed by GI  - Explant pathology with end stage emphysema as expected and all lymph nodes benign.      Immunosuppression:  Induction therapy with basiliximab (and high dose IV steroid) given intraoperatively, repeating basiliximab dose on POD#4.  - Tacrolimus 1 mg SL BID.  Goal tacrolimus level 8-12. Trending daily levels  Date Tacro Level Intervention   6/29 <1.0 Continue current dose, 1 mg bid   6/30 3.0 Increase to 2 mg bid   7/1 6.9 No adjustment    7/2 9.1  continue current dose    7/3  8.6  switch to tacrolimus by feeding tube 4 mg twice daily starting tomorrow (7/4) morning   7/4 10.2 Switched to enteral today. Not steady state. Continue current dose.                   - MMF 1500 mg BID  - Methylprednisolone 125 mg x 3 doses completed. Prednisolone BID with taper per lung transplant protocol:  Date AM dose (mg) PM dose (mg)   6/30 17.5 17.5   7/7 15 15   7/14 15 12.5   7/21 12.5 12.5   8/4 12.5 10   8/18 10 10   9/15 10 7.5   10/13 7.5 7.5   11/10 7.5 5   12/8 5 5   1/5/23 5 2.5      Prophylaxis:   - Bactrim for PJP ppx deferred initially post-op   - VGCV for CMV ppx (as below to start on 7/6)  - Nystatin for oral candidiasis ppx, 6 month course  - See below for serologies and viral ppx:    Donor Recipient Intervention   CMV status + + Valganciclovir POD #8-90   EBV status + + EBV check monthly   HSV status N/A + No Acyclovir  prophylaxis      Primary graft dysfunction (per ISHLT guidelines):  See chart below:    POD #0  (~0 hours) POD #1  (~24 hours)   Date 6/29/22 6/30/22   Time 0535 0356   Intubated Y Y   PaO2 163 184   FiO2 40% 40   P/F Ratio 408 460   PGD Grade   (0=mild, 3=severe) 0 0   ECMO N N   Inhaled NO/Flolan N N        Hemodynamics: Persistent hypotension/pressor requirements.  Etiology unclear. - Echocardiogram with normal ejection fraction.  IVC not visualized.   - Gentle hydration    BACILIO: Creatinine rising, likely multifactorial including bactrim and possibly volume depletion.   - Creatinine 1.96  - bactrim discontinued (see below)  - IV hydration     Left hand ischemia:  Radial artery thrombosis identified on duplex Doppler.  Thrombectomy under local anesthesia and MAC successful on 7/2. Completed high intensity heparin.  Continue daily aspirin.        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   - AFB to be sent on all future bronchs, last on 6/29  - IgG at one month 7/29 6/30 blood culture negative to date  6/29 respiratory cultures negative to date  6/28 respiratory culture with Stenotrophomonas maltophilia and Streptococcus pneumoniae        Stenotrophomonas Maltophilia: Noted in right explanted lung washings.   -  Sensitivities reviewed. Stopped treatment bactrim. Continue ceftazidime X 14d     H/O Hep C: C: Diagnosed in 1980s, 2 mos of treatment, quant negative since 10/2017, last positive 2/20/17 ([phone]).Glenis positive on 6/2021 with negative HCV PCR. Hx of remote ETOH abuse. MR Elastography 4/27/21 with hepatology review and consult without any concerns post transplant.   6/29 HIV RNA negative  Hepatitis B DNA negative  Hepatitis C RNA negative  Hepatitis C antibody positive (old)        History of steroid induced hyperglycemia: Well controlled in outpatient. Management by primary team currently on insulin gtt, may need endo  consult prior to discharge.      We appreciate the excellent care provided by the CVTS and CVICU teams.  Recommendations communicated via in person rounding and this note.  Will continue to follow along closely, please do not hesitate to call with any questions or concerns.        Ajay Castillo MD  [phone]           Interval History:   Slept well  Dyspnea at rest: None  Dyspnea on exertion: dyspnea with ambulation in the room  Cough: infrequent  Sputum: none   Hemoptysis: none   Chest Pain: Incisional, adequately controlled with current medication            Review of Systems:   C: NEGATIVE for fever, chills  INTEGUMENTARY/SKIN: no rash or obvious new lesions  ENT/MOUTH: mild sore throat, no new sinus pain or nasal drainage  RESP: see interval history  CV: NEGATIVE for chest pain, palpitations.  GI: no nausea, vomiting. Loose stool  MUSCULOSKELETAL: no myalgias, arthralgias            Medications:       acetaminophen  975 mg Oral Q8H     acetylcysteine  2 mL Nebulization 4x Daily     aspirin  81 mg Oral Daily     [START ON 7/6/2022] calcium carbonate 600 mg-vitamin D 400 units  1 tablet Oral BID w/meals     cefTAZidime  2 g Intravenous Q12H     gabapentin  100 mg Oral TID     heparin ANTICOAGULANT  5,000 Units Subcutaneous Q8H TONY     insulin aspart  1-12 Units Subcutaneous Q4H     insulin glargine  10 Units Subcutaneous QAM AC     levalbuterol  1.25 mg Nebulization 4x Daily     lidocaine  2 patch Transdermal Q24H     lidocaine   Transdermal Q8H TONY     methocarbamol  500 mg Oral 4x Daily     midodrine  10 mg Oral TID w/meals     multivitamins w/minerals  15 mL Per Feeding Tube Daily     mycophenolate  1,500 mg Oral BID    Or     mycophenolate  1,500 mg Oral or NG Tube BID     nystatin  1,000,000 Units Swish & Swallow 4x Daily     pantoprazole  40 mg Oral or Feeding Tube Daily     polyethylene glycol  17 g Oral BID     prednisoLONE  17.5 mg Oral or NG Tube BID     protein modular  1 packet Per Feeding Tube Daily      senna-docusate  2 tablet Oral BID     sodium chloride (PF)  3 mL Intracatheter Q8H     tacrolimus  4 mg Oral BID IS     [START ON 7/6/2022] valGANciclovir  900 mg Oral or NG Tube Daily     acetaminophen, bisacodyl, dextrose, glucose **OR** dextrose **OR** glucagon, HYDROmorphone **OR** HYDROmorphone, hydrOXYzine **OR** hydrOXYzine, lactated ringers, lidocaine 4%, lidocaine (buffered or not buffered), magnesium hydroxide, metoprolol, naloxone **OR** naloxone **OR** naloxone **OR** naloxone, ondansetron **OR** ondansetron, oxyCODONE **OR** oxyCODONE, prochlorperazine **OR** prochlorperazine, sodium chloride (PF)         Physical Exam:   Temp:  [97.4  F (36.3  C)-98.3  F (36.8  C)] 98.3  F (36.8  C)  Pulse:  [] 100  Resp:  [15-44] 20  BP: ()/(29-63) 100/48  FiO2 (%):  [21 %] 21 %  SpO2:  [82 %-100 %] 93 %    Intake/Output Summary (Last 24 hours) at 7/4/2022 0812  Last data filed at 7/4/2022 0700  Gross per 24 hour   Intake 3042.86 ml   Output 1609 ml   Net 1433.86 ml     Constitutional:   Awake, alert and in no apparent distress     Eyes:   Nonicteric, PERRL     ENT:    oral mucosa moist without lesions     Neck:   Supple without supraclavicular or cervical lymphadenopathy     Lungs:   Diminished air flow.  No crackles. No rhonchi.  No wheezes.     Cardiovascular:   Normal S1 and S2.  RRR.  II/VI sys murmur. No gallop. No rub.     Abdomen:   NABS, soft, nondistended, nontender.  No HSM.     Musculoskeletal:   1+ edema     Neurologic:   Alert and conversant.     Skin:   Warm, dry.  No rash on limited exam.             Data:   All laboratory and imaging data reviewed.    Results for orders placed or performed during the hospital encounter of 06/28/22 (from the past 24 hour(s))   Heparin Unfractionated Anti Xa Level   Result Value Ref Range    Anti Xa Unfractionated Heparin 0.66 For Reference Range, See Comment IU/mL    Narrative    Therapeutic Range: UFH: 0.25-0.50 IU/mL for low intensity  dosing,  0.30-0.70 IU/mL for high intensity dosing DVT and PE.  This test is not validated for other direct factor X inhibitors (e.g. rivaroxaban, apixaban, edoxaban, betrixaban, fondaparinux) and should not be used for monitoring of other medications.   Echo Complete   Result Value Ref Range    LVEF  55-60%     [facility]    [id number]  [id number]  [id number]  [id number]^PAULINO^EDUARDO     [facility],[place]  Echocardiography Laboratory  [address] [place] [id number]     Name: ALMAS CASIANO  MRN: [id number]  : 1960  Study Date: 2022 09:30 AM  Age: 62 yrs  Gender: Female  Patient Location: [facility]  Reason For Study: Shock  Ordering Physician: PURNIMA BOB  Performed By: Amalia aCsas     BSA: 1.8 m2  Height: 62 in  Weight: 165 lb  ______________________________________________________________________________  Procedure  Complete Portable Echo Adult.  ______________________________________________________________________________  Interpretation Summary  Global and regional left ventricular function is normal with an EF of 55-60%.  Right ventricular function appears normal on limited views.  No hemodynamically significant valve abnormalities.  Unable to visualize IVC.  No pericardial effusion.  No significant changes in ventricular function compared to study from 21.  ______________________________________________________________________________  Left Ventricle  Global and regional left ventricular function is normal with an EF of 55-60%.  Left ventricular wall thickness is normal. Left ventricular size is normal.  Left ventricular diastolic function is normal. No regional wall motion  abnormalities are seen.     Right Ventricle  Right ventricular function appears normal on limited views.     Atria  Both atria appear normal.     Mitral Valve  Mild mitral annular calcification is present. Trace mitral insufficiency is  present.     Aortic Valve  Aortic valve is normal in  structure and function. The aortic valve is  tricuspid.     Tricuspid Valve  The tricuspid valve is normal. The peak velocity of the tricuspid regurgitant  jet is not obtainable. Pulmonary artery systolic pressure cannot be assessed.     Pulmonic Valve  The valve leaflets are not well visualized. On Doppler interrogation, there is  no significant stenosis or regurgitation.     Vessels  Normal diameter aortic root and proximal ascending aorta. The inferior vena  cava cannot be assessed. Atheroma noted in ascending aorta.     Pericardium  No pericardial effusion is present.     Compared to Previous Study  This study was compared with the study from 6/30/21 .  ______________________________________________________________________________  MMode/2D Measurements & Calculations  IVSd: 1.1 cm  LVIDd: 4.0 cm  LVIDs: 2.5 cm  LVPWd: 0.94 cm  FS: 38.3 %  LV mass(C)d: 129.5 grams  LV mass(C)dI: 73.5 grams/m2  Ao root diam: 3.3 cm  asc Aorta Diam: 3.4 cm  LVOT diam: 2.2 cm  LVOT area: 3.7 cm2  RWT: 0.48     ______________________________________________________________________________  Report approved by: Mary Cool 07/03/2022 01:19 PM         Blood gas venous with oxyhemoglobin   Result Value Ref Range    pH Venous 7.27 (L) 7.32 - 7.43    pCO2 Venous 79 (HH) 40 - 50 mm Hg    pO2 Venous 41 25 - 47 mm Hg    Bicarbonate Venous 36 (H) 21 - 28 mmol/L    FIO2 1     Oxyhemoglobin Venous 73 70 - 75 %    Base Excess/Deficit (+/-) 7.3 (H) -7.7 - 1.9 mmol/L   Glucose by meter   Result Value Ref Range    GLUCOSE BY METER POCT 239 (H) 70 - 99 mg/dL   Blood gas venous with oxyhemoglobin   Result Value Ref Range    pH Venous 7.27 (L) 7.32 - 7.43    pCO2 Venous 78 (HH) 40 - 50 mm Hg    pO2 Venous 37 25 - 47 mm Hg    Bicarbonate Venous 35 (H) 21 - 28 mmol/L    FIO2 21     Oxyhemoglobin Venous 68 (L) 70 - 75 %    Base Excess/Deficit (+/-) 6.5 (H) -7.7 - 1.9 mmol/L   Heparin Unfractionated Anti Xa Level   Result Value Ref Range     Anti Xa Unfractionated Heparin 0.55 For Reference Range, See Comment IU/mL    Narrative    Therapeutic Range: UFH: 0.25-0.50 IU/mL for low intensity dosing,  0.30-0.70 IU/mL for high intensity dosing DVT and PE.  This test is not validated for other direct factor X inhibitors (e.g. rivaroxaban, apixaban, edoxaban, betrixaban, fondaparinux) and should not be used for monitoring of other medications.   Glucose by meter   Result Value Ref Range    GLUCOSE BY METER POCT 267 (H) 70 - 99 mg/dL   Glucose by meter   Result Value Ref Range    GLUCOSE BY METER POCT 267 (H) 70 - 99 mg/dL   EKG 12-lead, complete   Result Value Ref Range    Systolic Blood Pressure  mmHg    Diastolic Blood Pressure  mmHg    Ventricular Rate 103 BPM    Atrial Rate 103 BPM    VT Interval 134 ms    QRS Duration 74 ms     ms    QTc 479 ms    P Axis 66 degrees    R AXIS 57 degrees    T Axis 79 degrees    Interpretation ECG       Sinus tachycardia with Premature atrial complexes  Possible Left atrial enlargement  T wave abnormality, consider anterior ischemia  Abnormal ECG  When compared with ECG of 29-JUN-2022 02:52,  Premature atrial complexes are now Present     Basic metabolic panel   Result Value Ref Range    Creatinine 1.96 (H) 0.51 - 0.95 mg/dL    Sodium 144 136 - 145 mmol/L    Potassium 4.4 3.4 - 5.3 mmol/L    Urea Nitrogen 59.4 (H) 8.0 - 23.0 mg/dL    Chloride 106 98 - 107 mmol/L    Carbon Dioxide (CO2) 31 (H) 22 - 29 mmol/L    Anion Gap 7 7 - 15 mmol/L    Glucose 211 (H) 70 - 99 mg/dL    GFR Estimate 29 (L) >60 mL/min/1.73m2    Calcium 7.5 (L) 8.8 - 10.2 mg/dL   Ionized Calcium   Result Value Ref Range    Calcium Ionized 4.6 4.4 - 5.2 mg/dL   Magnesium   Result Value Ref Range    Magnesium 2.9 (H) 1.7 - 2.3 mg/dL   Phosphorus   Result Value Ref Range    Phosphorus 2.6 2.5 - 4.5 mg/dL   Blood gas venous with oxyhemoglobin   Result Value Ref Range    pH Venous 7.26 (L) 7.32 - 7.43    pCO2 Venous 78 (HH) 40 - 50 mm Hg    pO2 Venous 37 25 -  47 mm Hg    Bicarbonate Venous 35 (H) 21 - 28 mmol/L    FIO2 21     Oxyhemoglobin Venous 70 70 - 75 %    Base Excess/Deficit (+/-) 6.0 (H) -7.7 - 1.9 mmol/L   Glucose by meter   Result Value Ref Range    GLUCOSE BY METER POCT 202 (H) 70 - 99 mg/dL   XR Chest Port 1 View    Impression    RESIDENT PRELIMINARY INTERPRETATION  IMPRESSION:     1. Trace remaining right apical pneumothorax.  2. Unchanged perihilar/bibasilar atelectasis and/or edema.  3. Support devices stable.   Hepatic panel   Result Value Ref Range    Protein Total 4.4 (L) 6.4 - 8.3 g/dL    Albumin 2.6 (L) 3.5 - 5.2 g/dL    Bilirubin Total <0.2 <=1.2 mg/dL    Alkaline Phosphatase 60 35 - 104 U/L    AST 20 10 - 35 U/L    ALT 24 10 - 35 U/L    Bilirubin Direct <0.20 0.00 - 0.30 mg/dL   Lactic acid whole blood   Result Value Ref Range    Lactic Acid 1.3 0.7 - 2.0 mmol/L   CBC with platelets differential    Narrative    The following orders were created for panel order CBC with platelets differential.  Procedure                               Abnormality         Status                     ---------                               -----------         ------                     CBC with platelets and d...[[id number]]  Abnormal            Final result                 Please view results for these tests on the individual orders.   CBC with platelets and differential   Result Value Ref Range    WBC Count 12.2 (H) 4.0 - 11.0 10e3/uL    RBC Count 2.76 (L) 3.80 - 5.20 10e6/uL    Hemoglobin 8.6 (L) 11.7 - 15.7 g/dL    Hematocrit 28.8 (L) 35.0 - 47.0 %     (H) 78 - 100 fL    MCH 31.2 26.5 - 33.0 pg    MCHC 29.9 (L) 31.5 - 36.5 g/dL    RDW 15.8 (H) 10.0 - 15.0 %    Platelet Count 173 150 - 450 10e3/uL    % Neutrophils 89 %    % Lymphocytes 4 %    % Monocytes 5 %    % Eosinophils 0 %    % Basophils 0 %    % Immature Granulocytes 2 %    NRBCs per 100 WBC 0 <1 /100    Absolute Neutrophils 10.8 (H) 1.6 - 8.3 10e3/uL    Absolute Lymphocytes 0.5 (L) 0.8 - 5.3 10e3/uL     Absolute Monocytes 0.6 0.0 - 1.3 10e3/uL    Absolute Eosinophils 0.0 0.0 - 0.7 10e3/uL    Absolute Basophils 0.0 0.0 - 0.2 10e3/uL    Absolute Immature Granulocytes 0.3 <=0.4 10e3/uL    Absolute NRBCs 0.0 10e3/uL   Glucose by meter   Result Value Ref Range    GLUCOSE BY METER POCT 231 (H) 70 - 99 mg/dL   Ammonia   Result Value Ref Range    Ammonia 16 11 - 51 umol/L

## 2022-07-05 ENCOUNTER — APPOINTMENT (OUTPATIENT)
Dept: GENERAL RADIOLOGY | Facility: CLINIC | Age: 62
DRG: 007 | End: 2022-07-05
Attending: THORACIC SURGERY (CARDIOTHORACIC VASCULAR SURGERY)
Payer: MEDICARE

## 2022-07-05 LAB
ABO/RH(D): NORMAL
ALBUMIN UR-MCNC: 100 MG/DL
ANION GAP SERPL CALCULATED.3IONS-SCNC: 3 MMOL/L (ref 7–15)
ANION GAP SERPL CALCULATED.3IONS-SCNC: 3 MMOL/L (ref 7–15)
ANION GAP SERPL CALCULATED.3IONS-SCNC: 4 MMOL/L (ref 7–15)
ANION GAP SERPL CALCULATED.3IONS-SCNC: 5 MMOL/L (ref 7–15)
ANTIBODY SCREEN: NEGATIVE
APPEARANCE UR: ABNORMAL
ATRIAL RATE - MUSE: 103 BPM
BACTERIA BLD CULT: NO GROWTH
BACTERIA BLD CULT: NO GROWTH
BASE EXCESS BLDA CALC-SCNC: 1.2 MMOL/L (ref -9–1.8)
BASE EXCESS BLDA CALC-SCNC: 4.6 MMOL/L (ref -9–1.8)
BASE EXCESS BLDV CALC-SCNC: 3.1 MMOL/L (ref -7.7–1.9)
BASE EXCESS BLDV CALC-SCNC: 6.2 MMOL/L (ref -7.7–1.9)
BASOPHILS # BLD MANUAL: 0 10E3/UL (ref 0–0.2)
BASOPHILS NFR BLD MANUAL: 0 %
BILIRUB UR QL STRIP: NEGATIVE
BUN SERPL-MCNC: 72.8 MG/DL (ref 8–23)
BUN SERPL-MCNC: 73.6 MG/DL (ref 8–23)
BUN SERPL-MCNC: 75.4 MG/DL (ref 8–23)
BUN SERPL-MCNC: 84.6 MG/DL (ref 8–23)
CA-I BLD-MCNC: 4.4 MG/DL (ref 4.4–5.2)
CALCIUM SERPL-MCNC: 11.9 MG/DL (ref 8.8–10.2)
CALCIUM SERPL-MCNC: 7.3 MG/DL (ref 8.8–10.2)
CALCIUM SERPL-MCNC: 7.7 MG/DL (ref 8.8–10.2)
CALCIUM SERPL-MCNC: 8.1 MG/DL (ref 8.8–10.2)
CHLORIDE SERPL-SCNC: 100 MMOL/L (ref 98–107)
CHLORIDE SERPL-SCNC: 101 MMOL/L (ref 98–107)
CHLORIDE SERPL-SCNC: 102 MMOL/L (ref 98–107)
CHLORIDE SERPL-SCNC: 99 MMOL/L (ref 98–107)
COLOR UR AUTO: YELLOW
CREAT SERPL-MCNC: 1.92 MG/DL (ref 0.51–0.95)
CREAT SERPL-MCNC: 2.09 MG/DL (ref 0.51–0.95)
CREAT SERPL-MCNC: 2.09 MG/DL (ref 0.51–0.95)
CREAT SERPL-MCNC: 2.17 MG/DL (ref 0.51–0.95)
DEPRECATED HCO3 PLAS-SCNC: 28 MMOL/L (ref 22–29)
DEPRECATED HCO3 PLAS-SCNC: 30 MMOL/L (ref 22–29)
DEPRECATED HCO3 PLAS-SCNC: 31 MMOL/L (ref 22–29)
DEPRECATED HCO3 PLAS-SCNC: 32 MMOL/L (ref 22–29)
DIASTOLIC BLOOD PRESSURE - MUSE: NORMAL MMHG
EOSINOPHIL # BLD MANUAL: 0 10E3/UL (ref 0–0.7)
EOSINOPHIL NFR BLD MANUAL: 0 %
ERYTHROCYTE [DISTWIDTH] IN BLOOD BY AUTOMATED COUNT: 15.6 % (ref 10–15)
ERYTHROCYTE [DISTWIDTH] IN BLOOD BY AUTOMATED COUNT: 15.7 % (ref 10–15)
GFR SERPL CREATININE-BSD FRML MDRD: 25 ML/MIN/1.73M2
GFR SERPL CREATININE-BSD FRML MDRD: 26 ML/MIN/1.73M2
GFR SERPL CREATININE-BSD FRML MDRD: 26 ML/MIN/1.73M2
GFR SERPL CREATININE-BSD FRML MDRD: 29 ML/MIN/1.73M2
GLUCOSE BLDC GLUCOMTR-MCNC: 136 MG/DL (ref 70–99)
GLUCOSE BLDC GLUCOMTR-MCNC: 158 MG/DL (ref 70–99)
GLUCOSE BLDC GLUCOMTR-MCNC: 184 MG/DL (ref 70–99)
GLUCOSE BLDC GLUCOMTR-MCNC: 202 MG/DL (ref 70–99)
GLUCOSE BLDC GLUCOMTR-MCNC: 205 MG/DL (ref 70–99)
GLUCOSE BLDC GLUCOMTR-MCNC: 205 MG/DL (ref 70–99)
GLUCOSE BLDC GLUCOMTR-MCNC: 211 MG/DL (ref 70–99)
GLUCOSE BLDC GLUCOMTR-MCNC: 94 MG/DL (ref 70–99)
GLUCOSE SERPL-MCNC: 143 MG/DL (ref 70–99)
GLUCOSE SERPL-MCNC: 168 MG/DL (ref 70–99)
GLUCOSE SERPL-MCNC: 203 MG/DL (ref 70–99)
GLUCOSE SERPL-MCNC: 211 MG/DL (ref 70–99)
GLUCOSE UR STRIP-MCNC: NEGATIVE MG/DL
HCO3 BLD-SCNC: 29 MMOL/L (ref 21–28)
HCO3 BLD-SCNC: 32 MMOL/L (ref 21–28)
HCO3 BLDV-SCNC: 32 MMOL/L (ref 21–28)
HCO3 BLDV-SCNC: 34 MMOL/L (ref 21–28)
HCT VFR BLD AUTO: 26.7 % (ref 35–47)
HCT VFR BLD AUTO: 27.6 % (ref 35–47)
HGB BLD-MCNC: 7.9 G/DL (ref 11.7–15.7)
HGB BLD-MCNC: 8.2 G/DL (ref 11.7–15.7)
HGB UR QL STRIP: ABNORMAL
INTERPRETATION ECG - MUSE: NORMAL
KETONES UR STRIP-MCNC: ABNORMAL MG/DL
LACTATE SERPL-SCNC: 0.8 MMOL/L (ref 0.7–2)
LEUKOCYTE ESTERASE UR QL STRIP: NEGATIVE
LYMPHOCYTES # BLD MANUAL: 0.1 10E3/UL (ref 0.8–5.3)
LYMPHOCYTES NFR BLD MANUAL: 1 %
MAGNESIUM SERPL-MCNC: 3 MG/DL (ref 1.7–2.3)
MAGNESIUM SERPL-MCNC: 3.2 MG/DL (ref 1.7–2.3)
MCH RBC QN AUTO: 30.7 PG (ref 26.5–33)
MCH RBC QN AUTO: 30.9 PG (ref 26.5–33)
MCHC RBC AUTO-ENTMCNC: 29.6 G/DL (ref 31.5–36.5)
MCHC RBC AUTO-ENTMCNC: 29.7 G/DL (ref 31.5–36.5)
MCV RBC AUTO: 103 FL (ref 78–100)
MCV RBC AUTO: 104 FL (ref 78–100)
METAMYELOCYTES # BLD MANUAL: 0.2 10E3/UL
METAMYELOCYTES NFR BLD MANUAL: 2 %
MONOCYTES # BLD MANUAL: 0.7 10E3/UL (ref 0–1.3)
MONOCYTES NFR BLD MANUAL: 6 %
MUCOUS THREADS #/AREA URNS LPF: PRESENT /LPF
MYELOCYTES # BLD MANUAL: 0.2 10E3/UL
MYELOCYTES NFR BLD MANUAL: 2 %
NEUTROPHILS # BLD MANUAL: 10.1 10E3/UL (ref 1.6–8.3)
NEUTROPHILS NFR BLD MANUAL: 89 %
NITRATE UR QL: NEGATIVE
O2/TOTAL GAS SETTING VFR VENT: 25 %
O2/TOTAL GAS SETTING VFR VENT: 25 %
O2/TOTAL GAS SETTING VFR VENT: 30 %
O2/TOTAL GAS SETTING VFR VENT: 4 %
OXYHGB MFR BLDV: 77 % (ref 70–75)
OXYHGB MFR BLDV: 80 % (ref 70–75)
P AXIS - MUSE: 66 DEGREES
PCO2 BLD: 62 MM HG (ref 35–45)
PCO2 BLD: 67 MM HG (ref 35–45)
PCO2 BLDV: 74 MM HG (ref 40–50)
PCO2 BLDV: 81 MM HG (ref 40–50)
PH BLD: 7.27 [PH] (ref 7.35–7.45)
PH BLD: 7.29 [PH] (ref 7.35–7.45)
PH BLDV: 7.2 [PH] (ref 7.32–7.43)
PH BLDV: 7.27 [PH] (ref 7.32–7.43)
PH UR STRIP: 5.5 [PH] (ref 5–7)
PHOSPHATE SERPL-MCNC: 3.3 MG/DL (ref 2.5–4.5)
PHOSPHATE SERPL-MCNC: 3.6 MG/DL (ref 2.5–4.5)
PLAT MORPH BLD: ABNORMAL
PLATELET # BLD AUTO: 190 10E3/UL (ref 150–450)
PLATELET # BLD AUTO: 203 10E3/UL (ref 150–450)
PO2 BLD: 140 MM HG (ref 80–105)
PO2 BLD: 93 MM HG (ref 80–105)
PO2 BLDV: 41 MM HG (ref 25–47)
PO2 BLDV: 47 MM HG (ref 25–47)
POTASSIUM SERPL-SCNC: 5.5 MMOL/L (ref 3.4–5.3)
POTASSIUM SERPL-SCNC: 5.6 MMOL/L (ref 3.4–5.3)
POTASSIUM SERPL-SCNC: 5.6 MMOL/L (ref 3.4–5.3)
POTASSIUM SERPL-SCNC: 5.7 MMOL/L (ref 3.4–5.3)
POTASSIUM SERPL-SCNC: 5.7 MMOL/L (ref 3.4–5.3)
POTASSIUM SERPL-SCNC: 5.8 MMOL/L (ref 3.4–5.3)
PR INTERVAL - MUSE: 134 MS
QRS DURATION - MUSE: 74 MS
QT - MUSE: 366 MS
QTC - MUSE: 479 MS
R AXIS - MUSE: 57 DEGREES
RBC # BLD AUTO: 2.56 10E6/UL (ref 3.8–5.2)
RBC # BLD AUTO: 2.67 10E6/UL (ref 3.8–5.2)
RBC MORPH BLD: ABNORMAL
RBC URINE: 15 /HPF
SODIUM SERPL-SCNC: 133 MMOL/L (ref 136–145)
SODIUM SERPL-SCNC: 134 MMOL/L (ref 136–145)
SODIUM SERPL-SCNC: 135 MMOL/L (ref 136–145)
SODIUM SERPL-SCNC: 136 MMOL/L (ref 136–145)
SODIUM UR-SCNC: <20 MMOL/L
SP GR UR STRIP: 1.03 (ref 1–1.03)
SPECIMEN EXPIRATION DATE: NORMAL
SQUAMOUS EPITHELIAL: <1 /HPF
SYSTOLIC BLOOD PRESSURE - MUSE: NORMAL MMHG
T AXIS - MUSE: 79 DEGREES
TACROLIMUS BLD-MCNC: 7.7 UG/L (ref 5–15)
TME LAST DOSE: NORMAL H
TME LAST DOSE: NORMAL H
UROBILINOGEN UR STRIP-MCNC: NORMAL MG/DL
VENTRICULAR RATE- MUSE: 103 BPM
WBC # BLD AUTO: 11.4 10E3/UL (ref 4–11)
WBC # BLD AUTO: 9.5 10E3/UL (ref 4–11)
WBC URINE: 5 /HPF

## 2022-07-05 PROCEDURE — 250N000013 HC RX MED GY IP 250 OP 250 PS 637: Performed by: SURGERY

## 2022-07-05 PROCEDURE — 83735 ASSAY OF MAGNESIUM: CPT | Performed by: SURGERY

## 2022-07-05 PROCEDURE — 250N000013 HC RX MED GY IP 250 OP 250 PS 637: Performed by: STUDENT IN AN ORGANIZED HEALTH CARE EDUCATION/TRAINING PROGRAM

## 2022-07-05 PROCEDURE — 71045 X-RAY EXAM CHEST 1 VIEW: CPT | Mod: 26 | Performed by: RADIOLOGY

## 2022-07-05 PROCEDURE — 85007 BL SMEAR W/DIFF WBC COUNT: CPT | Performed by: SURGERY

## 2022-07-05 PROCEDURE — 258N000003 HC RX IP 258 OP 636: Performed by: THORACIC SURGERY (CARDIOTHORACIC VASCULAR SURGERY)

## 2022-07-05 PROCEDURE — 81001 URINALYSIS AUTO W/SCOPE: CPT | Performed by: CLINICAL NURSE SPECIALIST

## 2022-07-05 PROCEDURE — 86901 BLOOD TYPING SEROLOGIC RH(D): CPT | Performed by: STUDENT IN AN ORGANIZED HEALTH CARE EDUCATION/TRAINING PROGRAM

## 2022-07-05 PROCEDURE — 999N000157 HC STATISTIC RCP TIME EA 10 MIN

## 2022-07-05 PROCEDURE — 250N000009 HC RX 250: Performed by: SURGERY

## 2022-07-05 PROCEDURE — 250N000011 HC RX IP 250 OP 636

## 2022-07-05 PROCEDURE — 85014 HEMATOCRIT: CPT | Performed by: SURGERY

## 2022-07-05 PROCEDURE — 80048 BASIC METABOLIC PNL TOTAL CA: CPT | Performed by: SURGERY

## 2022-07-05 PROCEDURE — 84132 ASSAY OF SERUM POTASSIUM: CPT | Performed by: STUDENT IN AN ORGANIZED HEALTH CARE EDUCATION/TRAINING PROGRAM

## 2022-07-05 PROCEDURE — 258N000001 HC RX 258: Performed by: STUDENT IN AN ORGANIZED HEALTH CARE EDUCATION/TRAINING PROGRAM

## 2022-07-05 PROCEDURE — 93005 ELECTROCARDIOGRAM TRACING: CPT

## 2022-07-05 PROCEDURE — 82805 BLOOD GASES W/O2 SATURATION: CPT | Performed by: STUDENT IN AN ORGANIZED HEALTH CARE EDUCATION/TRAINING PROGRAM

## 2022-07-05 PROCEDURE — 82310 ASSAY OF CALCIUM: CPT | Performed by: STUDENT IN AN ORGANIZED HEALTH CARE EDUCATION/TRAINING PROGRAM

## 2022-07-05 PROCEDURE — 250N000012 HC RX MED GY IP 250 OP 636 PS 637: Performed by: INTERNAL MEDICINE

## 2022-07-05 PROCEDURE — 84100 ASSAY OF PHOSPHORUS: CPT | Performed by: SURGERY

## 2022-07-05 PROCEDURE — 200N000002 HC R&B ICU UMMC

## 2022-07-05 PROCEDURE — 94640 AIRWAY INHALATION TREATMENT: CPT | Mod: 76

## 2022-07-05 PROCEDURE — 250N000011 HC RX IP 250 OP 636: Performed by: SURGERY

## 2022-07-05 PROCEDURE — 94660 CPAP INITIATION&MGMT: CPT

## 2022-07-05 PROCEDURE — 94640 AIRWAY INHALATION TREATMENT: CPT

## 2022-07-05 PROCEDURE — 82803 BLOOD GASES ANY COMBINATION: CPT | Performed by: SURGERY

## 2022-07-05 PROCEDURE — 94668 MNPJ CHEST WALL SBSQ: CPT

## 2022-07-05 PROCEDURE — 250N000011 HC RX IP 250 OP 636: Performed by: STUDENT IN AN ORGANIZED HEALTH CARE EDUCATION/TRAINING PROGRAM

## 2022-07-05 PROCEDURE — 82330 ASSAY OF CALCIUM: CPT | Performed by: THORACIC SURGERY (CARDIOTHORACIC VASCULAR SURGERY)

## 2022-07-05 PROCEDURE — 99222 1ST HOSP IP/OBS MODERATE 55: CPT | Mod: FS | Performed by: CLINICAL NURSE SPECIALIST

## 2022-07-05 PROCEDURE — 250N000011 HC RX IP 250 OP 636: Performed by: THORACIC SURGERY (CARDIOTHORACIC VASCULAR SURGERY)

## 2022-07-05 PROCEDURE — 99233 SBSQ HOSP IP/OBS HIGH 50: CPT | Mod: 24 | Performed by: INTERNAL MEDICINE

## 2022-07-05 PROCEDURE — 87086 URINE CULTURE/COLONY COUNT: CPT | Performed by: CLINICAL NURSE SPECIALIST

## 2022-07-05 PROCEDURE — 250N000012 HC RX MED GY IP 250 OP 636 PS 637: Performed by: SURGERY

## 2022-07-05 PROCEDURE — 83605 ASSAY OF LACTIC ACID: CPT | Performed by: ANESTHESIOLOGY

## 2022-07-05 PROCEDURE — 250N000013 HC RX MED GY IP 250 OP 250 PS 637: Performed by: ANESTHESIOLOGY

## 2022-07-05 PROCEDURE — 85027 COMPLETE CBC AUTOMATED: CPT | Performed by: SURGERY

## 2022-07-05 PROCEDURE — 84300 ASSAY OF URINE SODIUM: CPT | Performed by: CLINICAL NURSE SPECIALIST

## 2022-07-05 PROCEDURE — 80197 ASSAY OF TACROLIMUS: CPT | Performed by: SURGERY

## 2022-07-05 PROCEDURE — 71045 X-RAY EXAM CHEST 1 VIEW: CPT

## 2022-07-05 PROCEDURE — 250N000013 HC RX MED GY IP 250 OP 250 PS 637

## 2022-07-05 PROCEDURE — 84100 ASSAY OF PHOSPHORUS: CPT | Performed by: THORACIC SURGERY (CARDIOTHORACIC VASCULAR SURGERY)

## 2022-07-05 PROCEDURE — 83735 ASSAY OF MAGNESIUM: CPT | Performed by: THORACIC SURGERY (CARDIOTHORACIC VASCULAR SURGERY)

## 2022-07-05 PROCEDURE — 93010 ELECTROCARDIOGRAM REPORT: CPT | Performed by: INTERNAL MEDICINE

## 2022-07-05 PROCEDURE — 86923 COMPATIBILITY TEST ELECTRIC: CPT | Performed by: STUDENT IN AN ORGANIZED HEALTH CARE EDUCATION/TRAINING PROGRAM

## 2022-07-05 PROCEDURE — 94667 MNPJ CHEST WALL 1ST: CPT

## 2022-07-05 PROCEDURE — 99291 CRITICAL CARE FIRST HOUR: CPT | Mod: 24 | Performed by: STUDENT IN AN ORGANIZED HEALTH CARE EDUCATION/TRAINING PROGRAM

## 2022-07-05 RX ORDER — FUROSEMIDE 10 MG/ML
80 INJECTION INTRAMUSCULAR; INTRAVENOUS ONCE
Status: COMPLETED | OUTPATIENT
Start: 2022-07-05 | End: 2022-07-05

## 2022-07-05 RX ORDER — DEXTROSE MONOHYDRATE 25 G/50ML
25-50 INJECTION, SOLUTION INTRAVENOUS
Status: DISCONTINUED | OUTPATIENT
Start: 2022-07-05 | End: 2022-08-17 | Stop reason: HOSPADM

## 2022-07-05 RX ORDER — FUROSEMIDE 10 MG/ML
40 INJECTION INTRAMUSCULAR; INTRAVENOUS ONCE
Status: COMPLETED | OUTPATIENT
Start: 2022-07-05 | End: 2022-07-05

## 2022-07-05 RX ORDER — MIDODRINE HYDROCHLORIDE 5 MG/1
20 TABLET ORAL EVERY 8 HOURS
Status: DISCONTINUED | OUTPATIENT
Start: 2022-07-05 | End: 2022-07-08

## 2022-07-05 RX ORDER — DEXTROSE MONOHYDRATE 25 G/50ML
25 INJECTION, SOLUTION INTRAVENOUS ONCE
Status: COMPLETED | OUTPATIENT
Start: 2022-07-05 | End: 2022-07-05

## 2022-07-05 RX ORDER — VALGANCICLOVIR HYDROCHLORIDE 50 MG/ML
450 POWDER, FOR SOLUTION ORAL
Status: DISCONTINUED | OUTPATIENT
Start: 2022-07-07 | End: 2022-07-20

## 2022-07-05 RX ORDER — NICOTINE POLACRILEX 4 MG
15-30 LOZENGE BUCCAL
Status: DISCONTINUED | OUTPATIENT
Start: 2022-07-05 | End: 2022-08-17 | Stop reason: HOSPADM

## 2022-07-05 RX ORDER — CALCIUM GLUCONATE 20 MG/ML
1 INJECTION, SOLUTION INTRAVENOUS ONCE
Status: COMPLETED | OUTPATIENT
Start: 2022-07-05 | End: 2022-07-05

## 2022-07-05 RX ADMIN — ACETAMINOPHEN 975 MG: 325 TABLET, FILM COATED ORAL at 13:29

## 2022-07-05 RX ADMIN — DEXTROSE 300 ML: 10 SOLUTION INTRAVENOUS at 09:45

## 2022-07-05 RX ADMIN — OXYCODONE HYDROCHLORIDE 10 MG: 10 TABLET ORAL at 07:56

## 2022-07-05 RX ADMIN — MIDODRINE HYDROCHLORIDE 10 MG: 5 TABLET ORAL at 07:32

## 2022-07-05 RX ADMIN — INSULIN ASPART 3 UNITS: 100 INJECTION, SOLUTION INTRAVENOUS; SUBCUTANEOUS at 03:49

## 2022-07-05 RX ADMIN — ACETAMINOPHEN 975 MG: 325 TABLET, FILM COATED ORAL at 22:06

## 2022-07-05 RX ADMIN — NYSTATIN 1000000 UNITS: 100000 SUSPENSION ORAL at 15:45

## 2022-07-05 RX ADMIN — ACETYLCYSTEINE 2 ML: 200 SOLUTION ORAL; RESPIRATORY (INHALATION) at 12:39

## 2022-07-05 RX ADMIN — METHOCARBAMOL 500 MG: 500 TABLET ORAL at 07:33

## 2022-07-05 RX ADMIN — DEXTROSE MONOHYDRATE 25 G: 25 INJECTION, SOLUTION INTRAVENOUS at 09:44

## 2022-07-05 RX ADMIN — NYSTATIN 1000000 UNITS: 100000 SUSPENSION ORAL at 07:33

## 2022-07-05 RX ADMIN — LIDOCAINE PATCH 4% 2 PATCH: 40 PATCH TOPICAL at 07:33

## 2022-07-05 RX ADMIN — MULTIVITAMIN 15 ML: LIQUID ORAL at 07:33

## 2022-07-05 RX ADMIN — OXYCODONE HYDROCHLORIDE 10 MG: 10 TABLET ORAL at 03:51

## 2022-07-05 RX ADMIN — SODIUM ZIRCONIUM CYCLOSILICATE 10 G: 10 POWDER, FOR SUSPENSION ORAL at 22:06

## 2022-07-05 RX ADMIN — CALCIUM GLUCONATE 1 G: 20 INJECTION, SOLUTION INTRAVENOUS at 09:45

## 2022-07-05 RX ADMIN — ACETYLCYSTEINE 2 ML: 200 SOLUTION ORAL; RESPIRATORY (INHALATION) at 16:35

## 2022-07-05 RX ADMIN — METHOCARBAMOL 500 MG: 500 TABLET ORAL at 20:30

## 2022-07-05 RX ADMIN — HEPARIN SODIUM 5000 UNITS: 5000 INJECTION, SOLUTION INTRAVENOUS; SUBCUTANEOUS at 22:06

## 2022-07-05 RX ADMIN — NYSTATIN 1000000 UNITS: 100000 SUSPENSION ORAL at 12:28

## 2022-07-05 RX ADMIN — ACETYLCYSTEINE 2 ML: 200 SOLUTION ORAL; RESPIRATORY (INHALATION) at 20:46

## 2022-07-05 RX ADMIN — INSULIN ASPART 1 UNITS: 100 INJECTION, SOLUTION INTRAVENOUS; SUBCUTANEOUS at 15:59

## 2022-07-05 RX ADMIN — LEVALBUTEROL HYDROCHLORIDE 1.25 MG: 1.25 SOLUTION RESPIRATORY (INHALATION) at 16:35

## 2022-07-05 RX ADMIN — HUMAN INSULIN 6.57 UNITS: 100 INJECTION, SOLUTION SUBCUTANEOUS at 09:44

## 2022-07-05 RX ADMIN — MYCOPHENOLATE MOFETIL 1500 MG: 200 POWDER, FOR SUSPENSION ORAL at 07:35

## 2022-07-05 RX ADMIN — SENNOSIDES AND DOCUSATE SODIUM 2 TABLET: 8.6; 5 TABLET ORAL at 20:30

## 2022-07-05 RX ADMIN — LEVALBUTEROL HYDROCHLORIDE 1.25 MG: 1.25 SOLUTION RESPIRATORY (INHALATION) at 12:39

## 2022-07-05 RX ADMIN — PREDNISOLONE 17.5 MG: 15 SOLUTION ORAL at 07:35

## 2022-07-05 RX ADMIN — Medication 1 PACKET: at 15:45

## 2022-07-05 RX ADMIN — TACROLIMUS 4 MG: 5 CAPSULE ORAL at 07:36

## 2022-07-05 RX ADMIN — METHOCARBAMOL 500 MG: 500 TABLET ORAL at 15:45

## 2022-07-05 RX ADMIN — ASPIRIN 81 MG CHEWABLE TABLET 81 MG: 81 TABLET CHEWABLE at 07:32

## 2022-07-05 RX ADMIN — OXYCODONE HYDROCHLORIDE 10 MG: 10 TABLET ORAL at 16:09

## 2022-07-05 RX ADMIN — INSULIN ASPART 3 UNITS: 100 INJECTION, SOLUTION INTRAVENOUS; SUBCUTANEOUS at 08:19

## 2022-07-05 RX ADMIN — FUROSEMIDE 40 MG: 10 INJECTION, SOLUTION INTRAVENOUS at 13:22

## 2022-07-05 RX ADMIN — CEFTAZIDIME 2 G: 2 INJECTION, POWDER, FOR SOLUTION INTRAVENOUS at 07:37

## 2022-07-05 RX ADMIN — GABAPENTIN 100 MG: 100 CAPSULE ORAL at 07:33

## 2022-07-05 RX ADMIN — METHOCARBAMOL 500 MG: 500 TABLET ORAL at 12:28

## 2022-07-05 RX ADMIN — ACETYLCYSTEINE 2 ML: 200 SOLUTION ORAL; RESPIRATORY (INHALATION) at 09:42

## 2022-07-05 RX ADMIN — LEVALBUTEROL HYDROCHLORIDE 1.25 MG: 1.25 SOLUTION RESPIRATORY (INHALATION) at 20:46

## 2022-07-05 RX ADMIN — HEPARIN SODIUM 5000 UNITS: 5000 INJECTION, SOLUTION INTRAVENOUS; SUBCUTANEOUS at 13:29

## 2022-07-05 RX ADMIN — HEPARIN SODIUM 5000 UNITS: 5000 INJECTION, SOLUTION INTRAVENOUS; SUBCUTANEOUS at 06:06

## 2022-07-05 RX ADMIN — MYCOPHENOLATE MOFETIL 1500 MG: 200 POWDER, FOR SUSPENSION ORAL at 20:30

## 2022-07-05 RX ADMIN — PREDNISOLONE 17.5 MG: 15 SOLUTION ORAL at 20:30

## 2022-07-05 RX ADMIN — TACROLIMUS 4 MG: 5 CAPSULE ORAL at 18:03

## 2022-07-05 RX ADMIN — Medication 40 MG: at 07:35

## 2022-07-05 RX ADMIN — MIDODRINE HYDROCHLORIDE 20 MG: 5 TABLET ORAL at 15:45

## 2022-07-05 RX ADMIN — INSULIN ASPART 3 UNITS: 100 INJECTION, SOLUTION INTRAVENOUS; SUBCUTANEOUS at 12:28

## 2022-07-05 RX ADMIN — NYSTATIN 1000000 UNITS: 100000 SUSPENSION ORAL at 20:30

## 2022-07-05 RX ADMIN — LEVALBUTEROL HYDROCHLORIDE 1.25 MG: 1.25 SOLUTION RESPIRATORY (INHALATION) at 09:42

## 2022-07-05 RX ADMIN — HEPARIN SODIUM 3 UNITS/HR: 1000 INJECTION INTRAVENOUS; SUBCUTANEOUS at 13:26

## 2022-07-05 RX ADMIN — HYDROMORPHONE HYDROCHLORIDE 0.2 MG: 0.2 INJECTION, SOLUTION INTRAMUSCULAR; INTRAVENOUS; SUBCUTANEOUS at 01:12

## 2022-07-05 RX ADMIN — HYDROMORPHONE HYDROCHLORIDE 0.2 MG: 0.2 INJECTION, SOLUTION INTRAMUSCULAR; INTRAVENOUS; SUBCUTANEOUS at 03:51

## 2022-07-05 RX ADMIN — FUROSEMIDE 80 MG: 10 INJECTION, SOLUTION INTRAVENOUS at 15:34

## 2022-07-05 RX ADMIN — HYDROXYZINE HYDROCHLORIDE 50 MG: 25 TABLET ORAL at 07:32

## 2022-07-05 RX ADMIN — ACETAMINOPHEN 975 MG: 325 TABLET, FILM COATED ORAL at 06:06

## 2022-07-05 ASSESSMENT — ACTIVITIES OF DAILY LIVING (ADL)
ADLS_ACUITY_SCORE: 32
ADLS_ACUITY_SCORE: 36
ADLS_ACUITY_SCORE: 36
ADLS_ACUITY_SCORE: 32
ADLS_ACUITY_SCORE: 36
ADLS_ACUITY_SCORE: 32
ADLS_ACUITY_SCORE: 36
ADLS_ACUITY_SCORE: 32
ADLS_ACUITY_SCORE: 36
ADLS_ACUITY_SCORE: 32
ADLS_ACUITY_SCORE: 36
ADLS_ACUITY_SCORE: 32

## 2022-07-05 NOTE — PROGRESS NOTES
CV ICU PROGRESS NOTE  07/05/2022        CO-MORBIDITIES:   HTN, HFpEF, COPD, HCV    ASSESSMENT: Sofie Rodriguez is a 62 year old female with PMH of oxygen-dependent COPD, HFpEF, HTN, HCV, and osteopenia who underwent bilateral lung transplant on 6/28/22 with Dr. Sunshine. This was complicated by left upper extremity acute limb ischemia s/p left radial thrombectomy on 7/1/22.       PLAN SUMMARY:  - Immunosuppression and prophylaxis per pulmonary transplant team  - Trend VBG's  - Wean off BiPAP (ongoing respiratory acidosis) as able; HFNC if able  - Increase Lantus to 35 units daily to optimize glycemic control  - Consult nephrology given worsening BACILIO; fluid challenge vs. diuresis pending recs  - Wean off pressors as able; continues to require NE  - Increase midodrine dose from 10 to 20  - SQH  - ASA  - Pending swallow study  - Multi-modal analgesia: discontinue gabapentin       PLAN:  Neuro/ pain/ sedation:  Acute postoperative pain  - Monitor neurological status. Notify the MD for any acute changes in exam.  - Scheduled: acetaminophen, robaxin. PRN: oxycodone, dilaudid     Pulmonary:  #Postoperative ventilatory support  #Bilateral Lung Transplant  #Hx COPD  - Titrate FiO2 for SpO2 >92%  - Pulmonary hygiene: Incentive spirometer every 15- 30 minutes when awake  - Basiliximab POD #0 and #4, prednisolone, mycophenolate, tacrolimus  - Valganciclovir       Cardiovascular:  #Hx HTN  #Hx HFpEF  - Monitor hemodynamic status.   - Goal MAP >65  - Norepinephrine drip, wean as tolerated     GI care/ Nutrition:   - NJ TF @ goal  - PPI: protonix  - Continue bowel regimen: miralax; PRN moM    Renal/ Fluid Balance/ Electrolytes:   #BACILIO  - Strict I/O, daily weights  - Avoid/limit nephrotoxins as able  - Replete lytes PRN per protocol     Endocrine:    Stress induced hyperglycemia  - High sliding scale insulin 7/2  - Lantus 35 units daily     ID/ Antibiotics:  Stress-induced leukocytosis  - Continue to monitor fever curve, WBC and  inflammatory markers as appropriate  - Prophylaxis: POD #8-90 nystatin, TMP/SMX (discontined due to BACILIO), valganciclovir  - Post-op abx: ceftazadime extended to 14 day coverage given elevated temp and WBCs  - Post-op cultures:   - Gram stain (1+ G+ cocci)   - Resp aerobic cx (4+ G- bacilli)   - Fungal cx (NGTD)   - AFB cx (NGTD)  - Blood cx sent 6/30     Heme:     Acute blood loss anemia  Acute blood loss thrombocytopenia  Left upper extremity acute limb ischemia s/p left radial thrombectomy on 7/1/22  - Transitioned from heparin gtt to SQH per vascular surgery  - Daily CBC        Prophylaxis:    - DVT: mechanical +SQH  - PPI      Lines/ tubes/ drains:  - CTs x5, RIJ, montgomery, PIV x2  - 4 Pleural, 1 med     Disposition:  - CVICU    Patient seen, findings and plan discussed with CV ICU staff, Dr. Bolden.    Danuta Chairez MD  Anesthesiology Resident, PGY-4        ====================================    SUBJECTIVE:   naeo    OBJECTIVE:   1. VITAL SIGNS:   Temp:  [97.6  F (36.4  C)-98.3  F (36.8  C)] 98.1  F (36.7  C)  Pulse:  [] 80  Resp:  [12-42] 24  BP: ()/(43-67) 120/55  FiO2 (%):  [21 %-25 %] 25 %  SpO2:  [90 %-100 %] 100 %  FiO2 (%): 25 %  Resp: 24      2. INTAKE/ OUTPUT:   I/O last 3 completed shifts:  In: 2949.73 [P.O.:480; I.V.:879.73; NG/GT:510]  Out: 1730 [Urine:415; Chest Tube:1315]    3. PHYSICAL EXAMINATION:   General: lethargic; follows commands  Neuro: grossly intact  Resp: on BiPAP  CV: RRR  Abdomen: Soft, Non-distended, Non-tender  Incisions: c/d/i  Extremities: warm and well perfused    4. INVESTIGATIONS:   Arterial Blood Gases   Recent Labs   Lab 07/02/22  0241 06/30/22  1037 06/30/22  0356 06/29/22  2339   PH 7.19* 7.37 7.54* 7.48*   PCO2 77* 51* 34* 40   PO2 93 133* 184* 162*   HCO3 29* 29* 29* 29*     Complete Blood Count   Recent Labs   Lab 07/05/22  0807 07/05/22  0345 07/04/22  0323 07/03/22  0333   WBC 9.5 11.4* 12.2* 13.9*   HGB 7.9* 8.2* 8.6* 8.7*    203 173 151      Basic Metabolic Panel  Recent Labs   Lab 07/05/22  1021 07/05/22  1002 07/05/22  0812 07/05/22  0652 07/05/22  0348 07/05/22  0345 07/03/22  2346 07/03/22  2231 07/03/22  0334 07/03/22  0333 07/02/22  1501 07/02/22  1456   NA  --   --   --   --   --  136  --  144  --  142  --  143   POTASSIUM  --  5.6*  --  5.7*  --  5.8*  --  4.4  --  4.2  --  4.1   CHLORIDE  --   --   --   --   --  101  --  106  --  107  --  109*   CO2  --   --   --   --   --  31*  --  31*  --  32*  --  28   BUN  --   --   --   --   --  73.6*  --  59.4*  --  49.7*  --  44.6*   CR  --   --   --   --   --  2.09*  --  1.96*  --  1.80*  --  1.56*   *  --  211*  --  202* 211*   < > 211*   < > 238*   < > 113*    < > = values in this interval not displayed.     Liver Function Tests  Recent Labs   Lab 07/04/22  0323 06/30/22  0355 06/29/22  2341 06/29/22  0248 06/29/22  0007 06/28/22  1258   AST 20 65* 78* 143*  --  30   ALT 24 23 27 33  --  16   ALKPHOS 60 35 40 36  --  86   BILITOTAL <0.2 0.3 0.3 0.9  --  0.4   ALBUMIN 2.6* 2.6* 2.8* 2.3*  --  5.3*   INR  --   --   --  1.33* 1.60* 0.91     Pancreatic Enzymes  No lab results found in last 7 days.  Coagulation Profile  Recent Labs   Lab 06/29/22  0248 06/29/22  0007 06/28/22  1258   INR 1.33* 1.60* 0.91   PTT 52* 32 29         5. RADIOLOGY:   Recent Results (from the past 24 hour(s))   XR Chest Port 1 View    Narrative    EXAM: XR CHEST PORT 1 VIEW  7/5/2022 1:35 AM     HISTORY:  Post-Op Lung       COMPARISON:  7/4/2022    FINDINGS  Technique: Semiupright AP view of the chest.     Devices: Right internal jugular approach central venous catheter  terminates over the mid SVC. Bilateral apical and basal chest tubes  are not appreciably changed. Clamshell sternotomy wires.    Unchanged streaky perihilar and bibasilar pulmonary opacities. Cardiac  silhouette is stable in size. Aortic arch calcifications. Remaining  trace right apical pneumothorax. No pleural effusion.       Impression    IMPRESSION:    No significant interval change. Continued trace right apical  pneumothorax.    I have personally reviewed the examination and initial interpretation  and I agree with the findings.    GABRIELLA SNELL MD         SYSTEM ID:  [id number]       =========================================

## 2022-07-05 NOTE — PROGRESS NOTES
CLINICAL NUTRITION SERVICES - BRIEF NOTE   (See RD note on 6/29 for full assessment)     Reason for RD note: Verbal discussion with fellow to change TF to renal formula     New Findings/Chart Review:  Nutrition support:     7/2-current: Osmolite 1.5 Rayshawn @ goal of 45ml/hr (1080ml/day) + 1 pkt Prosource daily will provide: 1660 kcals (30 kcal/kg), 78 g PRO (1.4 g/kg), 822 ml free H20, 219 g CHO, and 0 g fiber daily.     Enteral Access: NDT    Renal: Worsening BACILIO, rising BUN/Cr, hyperkalemia     Respiratory: BiPAP    GI: Loose stools    Labs: Reviewed      Meds: Reviewed    Interventions:  Switch to renal TF formula:   --Novasource Renal @ 35 ml/hr (840 ml) + 1 pkt Prosource daily provides 1720 kcal (30 kcal/kg), 87 g pro (1.5 g/kg), 154 g CHO, 602 ml free water, and 0 g fiber daily.    --Continue certavite daily     Future/Additional Recommendations:  -Monitor TF tolerance and adjust PRN  -Continue to monitor labs, stools, weight trends     Nutrition will continue to follow per protocol.    Julia Alva, MS, RD, LD  4E (CVICU) RD pager: [phone]  Ascom: [id number]  Weekend/Holiday RD pager: [phone]

## 2022-07-05 NOTE — PROGRESS NOTES
Lung Transplant Consult Follow Up Note   July 5, 2022            Assessment and Plan:   Sofie Rodriguez is a 62 year old female with a PMH significant for end stage COPD, HTN, Hep C, and osteopenia as well as former methamphetamine use.  Pt. 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  Surgery on CPB, uncomplicated, lungs slightly undersized for chest. Did require some moderate volume resuscitation with low dose pressors post transplant. Extubated 6/30. Progressive CO2 retention, improved with BiPAP. Persistent low dose pressor requirement of unclear etiology.  Left radial artery thrombus (presumed secondary to arterial line) with thrombectomy under local anesthesia and MAC on 7/2.     Recommendations today:   - Continue Bipap for CO2 retention/acidosis at night and as needed during the day depending on VBG results  - Monitor VBGs  - Renal consult for progressive BACILIO.  - Sputum airway cultures with stenotrophomonas and Strep pneumo. Continue ceftaz.  - Tacrolimus low at  7.7 but not steady state. Continue current dose and close monitoring.  - Prednisone taper due 7/7 (not ordered)  - begin valcyte prophylaxis 7/7 (ordered)  - Begin bactrim prophylaxis 7/7 but will dose every other day due to elevated creatinine.     S/p bilateral sequential lung transplant (BSLT) for end stage COPD:  Acute on chronic hypoxic respiratory failure: No evidence of PGD post operatively, Extubated to 2L NC on 6/30.  Ongoing low dose pressor requirement.  Persistent CO2 retention but the patient is not tachypneic/dyspneic so it appears to be a drive issue.   - 7/5 chest x-ray, reviewed by me, no acute infiltrate and no significant change from previous.  - VBG 7.18/88 (10:28 a.m., 7/4)  - VBG 7.27/74 (3:45 AM, 7/5)  - CO2 retention but oxygenating well and no evidence of resp distress. Likely reflects preop/chronic CO2 retention. May take some time to re-equilibrate. Recommend BiPAP at night and through the day as needed depending on VBG.  Minimize  sedation. Reduce supplemental O2 to keep SaO2 93-96%. Monitor VBGs  - Nebs: levalbuterol and Mucomyst QID   - Aggressive pulmonary toilet with chest physiotherapy QID (addition of Aerobika and incentive spirometry once extubated)  - DSA on 7/5 (ordered) then one month post-transplant  - Ammonia monitoring every 48 hours x 3 weeks (screening for hyperammonemia post-lung transplant). Ammonia 16 (7/4)  - Bronch 6/29 with patent airways, no significant ischemic reperfusion injury, no significant edema, occasional mucous plugs in lower lobes bilaterally but removed upon therapeutic suctioning.   - PVTS catheters placed 6/29 but removed on 7/2 due to the requirement for heparinization for radial artery thrombectomy.  - Chest tubes managed by surgical team  - Daily CXR  - Post-pyloric feeding tube placed by GI  - Explant pathology with end stage emphysema as expected and all lymph nodes benign.      Immunosuppression:  Induction therapy with basiliximab (and high dose IV steroid) given intraoperatively, repeating basiliximab dose on POD#4.  - Tacrolimus 1 mg SL BID.  Goal tacrolimus level 8-12. Trending daily levels  Date Tacro Level Intervention   6/29 <1.0 Continue current dose, 1 mg bid   6/30 3.0 Increase to 2 mg bid   7/1 6.9 No adjustment    7/2 9.1  continue current dose    7/3  8.6  switch to tacrolimus by feeding tube 4 mg twice daily starting tomorrow (7/4) morning   7/4 10.2 Switched to enteral today. Not steady state. Continue current dose.   7/5  7.7 Not steady state, continue current dose.           - MMF 1500 mg BID  - Methylprednisolone 125 mg x 3 doses completed. Prednisolone BID with taper per lung transplant protocol:  Date AM dose (mg) PM dose (mg)   6/30 17.5 17.5   7/7 15 15   7/14 15 12.5   7/21 12.5 12.5   8/4 12.5 10   8/18 10 10   9/15 10 7.5   10/13 7.5 7.5   11/10 7.5 5   12/8 5 5   1/5/23 5 2.5      Prophylaxis:   - Bactrim for PJP ppx, begin 7/7, every other day for elevated creatinine  - VGCV  for CMV ppx (as below to start on 7/7)  - Nystatin for oral candidiasis ppx, 6 month course  - See below for serologies and viral ppx:    Donor Recipient Intervention   CMV status + + Valganciclovir POD #8-90   EBV status + + EBV check monthly   HSV status N/A + No Acyclovir prophylaxis      Primary graft dysfunction (per ISHLT guidelines):  See chart below:    POD #0  (~0 hours) POD #1  (~24 hours)   Date 6/29/22 6/30/22   Time 0535 0356   Intubated Y Y   PaO2 163 184   FiO2 40% 40   P/F Ratio 408 460   PGD Grade   (0=mild, 3=severe) 0 0   ECMO N N   Inhaled NO/Flolan N N         Hemodynamics: Persistent hypotension/pressor requirements.  Etiology unclear. - Echocardiogram with normal ejection fraction.  IVC not visualized.          BACILIO: Creatinine rising, likely multifactorial including bactrim and hypotension. Unlikely that tacro has been playing a significant role since levels have generally been low.  Volume status is unclear.  Hypotension and rising creatinine suggests she is volume depleted but intake has generally been greater than output by greater than a liter each day and the patient does have some dependent edema.  Potassium 5.7  Creatinine 2.09  - bactrim discontinued (see below)  - recommend formal renal consultation     Left hand ischemia:  Radial artery thrombosis identified on duplex Doppler.  Thrombectomy under local anesthesia and MAC successful on 7/2. Completed high intensity heparin.  Continue daily aspirin.        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   - AFB to be sent on all future bronchs, last on 6/29  - IgG at one month 7/29 6/30 blood culture negative to date  6/29 respiratory cultures negative to date  6/28 respiratory culture with Stenotrophomonas maltophilia and Streptococcus pneumoniae        Stenotrophomonas Maltophilia: Noted in right explanted lung washings.   -  Sensitivities reviewed.  Stopped treatment bactrim. Continue ceftazidime X 14d     H/O Hep C: C: Diagnosed in 1980s, 2 mos of treatment, quant negative since 10/2017, last positive 2/20/17 ([phone]).Glenis positive on 6/2021 with negative HCV PCR. Hx of remote ETOH abuse. MR Elastography 4/27/21 with hepatology review and consult without any concerns post transplant.   6/29 HIV RNA negative  Hepatitis B DNA negative  Hepatitis C RNA negative  Hepatitis C antibody positive (old)        History of steroid induced hyperglycemia: Well controlled in outpatient. Management by primary team currently on insulin gtt, may need endo consult prior to discharge.      We appreciate the excellent care provided by the CVTS and CVICU teams.  Recommendations communicated via in person rounding and this note.  Will continue to follow along closely, please do not hesitate to call with any questions or concerns.        Ajay Castillo MD  [phone]           Interval History:   Slight increase in dyspnea at rest.  Dyspnea with mild exertion.  Occasional cough.  No sputum production.  Incisional pain adequately controlled with current analgesics.           Review of Systems:   C: NEGATIVE for fever, chills  INTEGUMENTARY/SKIN: no rash or obvious new lesions  ENT/MOUTH: Persistent mild sore throat, new sinus pain or nasal drainage  RESP: see interval history  CV: NEGATIVE for chest pain, palpitations  GI: no nausea, vomiting, change in stools  : Dong  MUSCULOSKELETAL: no myalgias, arthralgias            Medications:       acetaminophen  975 mg Oral Q8H     acetylcysteine  2 mL Nebulization 4x Daily     aspirin  81 mg Oral Daily     [START ON 7/6/2022] calcium carbonate 600 mg-vitamin D 400 units  1 tablet Oral BID w/meals     cefTAZidime  2 g Intravenous Q24H     gabapentin  100 mg Oral TID     heparin ANTICOAGULANT  5,000 Units Subcutaneous Q8H TONY     insulin aspart  1-12 Units Subcutaneous Q4H     insulin glargine  35 Units Subcutaneous QAM AC     levalbuterol   1.25 mg Nebulization 4x Daily     lidocaine  2 patch Transdermal Q24H     lidocaine   Transdermal Q8H [facility]     methocarbamol  500 mg Oral 4x Daily     midodrine  10 mg Oral TID w/meals     multivitamins w/minerals  15 mL Per Feeding Tube Daily     mycophenolate  1,500 mg Oral BID    Or     mycophenolate  1,500 mg Oral or NG Tube BID     nystatin  1,000,000 Units Swish & Swallow 4x Daily     pantoprazole  40 mg Oral or Feeding Tube Daily     polyethylene glycol  17 g Oral BID     prednisoLONE  17.5 mg Oral or NG Tube BID     protein modular  1 packet Per Feeding Tube Daily     senna-docusate  2 tablet Oral BID     sodium chloride (PF)  3 mL Intracatheter Q8H     tacrolimus  4 mg Oral BID IS     [START ON 7/6/2022] valGANciclovir  900 mg Oral or NG Tube Daily     acetaminophen, bisacodyl, dextrose, glucose **OR** dextrose **OR** glucagon, HYDROmorphone **OR** HYDROmorphone, hydrOXYzine **OR** hydrOXYzine, lactated ringers, lidocaine 4%, lidocaine (buffered or not buffered), magnesium hydroxide, metoprolol, naloxone **OR** naloxone **OR** naloxone **OR** naloxone, ondansetron **OR** ondansetron, oxyCODONE **OR** oxyCODONE, prochlorperazine **OR** prochlorperazine, sodium chloride (PF)         Physical Exam:   Temp:  [97.6  F (36.4  C)-98.3  F (36.8  C)] 98.1  F (36.7  C)  Pulse:  [] 80  Resp:  [12-42] 24  BP: ()/(43-67) 120/55  FiO2 (%):  [21 %-25 %] 25 %  SpO2:  [90 %-100 %] 100 %    Intake/Output Summary (Last 24 hours) at 7/5/2022 0811  Last data filed at 7/5/2022 0700  Gross per 24 hour   Intake 2703.26 ml   Output 1628 ml   Net 1075.26 ml     Constitutional:   Awake, alert and in no apparent distress     Eyes:   nonicteric     ENT:    oral mucosa moist without lesions.  Hoarse voice.     Neck:   Right IJ central line     Lungs:   Decreased air flow.  No crackles. No rhonchi.  No wheezes.     Cardiovascular:   Normal S1 and S2.  RRR.  2/6 systolic murmur.  Intermittent pericardial rub.  No gallop.      Abdomen:   NABS, soft, nondistended, nontender.  No HSM.     Musculoskeletal:   1+ pretibial edema     Neurologic:   Alert and conversant.     Skin:   Warm, dry.  No rash on limited exam.             Data:   All laboratory and imaging data reviewed.    Results for orders placed or performed during the hospital encounter of 06/28/22 (from the past 24 hour(s))   Blood gas venous with oxyhemoglobin   Result Value Ref Range    pH Venous 7.18 (LL) 7.32 - 7.43    pCO2 Venous 88 (HH) 40 - 50 mm Hg    pO2 Venous 47 25 - 47 mm Hg    Bicarbonate Venous 32 (H) 21 - 28 mmol/L    FIO2 28     Oxyhemoglobin Venous 78 (H) 70 - 75 %    Base Excess/Deficit (+/-) 2.7 (H) -7.7 - 1.9 mmol/L   Blood gas venous with oxyhemoglobin   Result Value Ref Range    pH Venous 7.25 (L) 7.32 - 7.43    pCO2 Venous 75 (H) 40 - 50 mm Hg    pO2 Venous 38 25 - 47 mm Hg    Bicarbonate Venous 33 (H) 21 - 28 mmol/L    FIO2 21     Oxyhemoglobin Venous 72 70 - 75 %    Base Excess/Deficit (+/-) 4.5 (H) -7.7 - 1.9 mmol/L   Glucose by meter   Result Value Ref Range    GLUCOSE BY METER POCT 210 (H) 70 - 99 mg/dL   Blood Culture Hand, Right    Specimen: Hand, Right; Blood   Result Value Ref Range    Culture No growth after 12 hours    Blood Culture Line, venous    Specimen: Line, venous; Blood   Result Value Ref Range    Culture No growth after 12 hours    Glucose by meter   Result Value Ref Range    GLUCOSE BY METER POCT 209 (H) 70 - 99 mg/dL   Blood gas venous with oxyhemoglobin   Result Value Ref Range    pH Venous 7.24 (L) 7.32 - 7.43    pCO2 Venous 77 (HH) 40 - 50 mm Hg    pO2 Venous 41 25 - 47 mm Hg    Bicarbonate Venous 33 (H) 21 - 28 mmol/L    FIO2 25     Oxyhemoglobin Venous 75 70 - 75 %    Base Excess/Deficit (+/-) 4.5 (H) -7.7 - 1.9 mmol/L   Glucose by meter   Result Value Ref Range    GLUCOSE BY METER POCT 179 (H) 70 - 99 mg/dL   PRA Donor Specific Antibody    Narrative    The following orders were created for panel order PRA Donor Specific  Antibody.  Procedure                               Abnormality         Status                     ---------                               -----------         ------                     PRA Donor Specific Antibody[[id number]]                      In process                   Please view results for these tests on the individual orders.   Glucose by meter   Result Value Ref Range    GLUCOSE BY METER POCT 184 (H) 70 - 99 mg/dL   XR Chest Port 1 View    Narrative    EXAM: XR CHEST PORT 1 VIEW  7/5/2022 1:35 AM     HISTORY:  Post-Op Lung       COMPARISON:  7/4/2022    FINDINGS  Technique: Semiupright AP view of the chest.     Devices: Right internal jugular approach central venous catheter  terminates over the mid SVC. Bilateral apical and basal chest tubes  are not appreciably changed. Clamshell sternotomy wires.    Unchanged streaky perihilar and bibasilar pulmonary opacities. Cardiac  silhouette is stable in size. Aortic arch calcifications. Remaining  trace right apical pneumothorax. No pleural effusion.       Impression    IMPRESSION:   No significant interval change. Continued trace right apical  pneumothorax.    I have personally reviewed the examination and initial interpretation  and I agree with the findings.    GABRIELLA SNELL MD         SYSTEM ID:  [id number]   Basic metabolic panel   Result Value Ref Range    Creatinine 2.09 (H) 0.51 - 0.95 mg/dL    Sodium 136 136 - 145 mmol/L    Potassium 5.8 (H) 3.4 - 5.3 mmol/L    Urea Nitrogen 73.6 (H) 8.0 - 23.0 mg/dL    Chloride 101 98 - 107 mmol/L    Carbon Dioxide (CO2) 31 (H) 22 - 29 mmol/L    Anion Gap 4 (L) 7 - 15 mmol/L    Glucose 211 (H) 70 - 99 mg/dL    GFR Estimate 26 (L) >60 mL/min/1.73m2    Calcium 7.7 (L) 8.8 - 10.2 mg/dL   Magnesium   Result Value Ref Range    Magnesium 3.2 (H) 1.7 - 2.3 mg/dL   Phosphorus   Result Value Ref Range    Phosphorus 3.6 2.5 - 4.5 mg/dL   Lactic acid whole blood   Result Value Ref Range    Lactic Acid 0.8 0.7 - 2.0 mmol/L   Blood  gas venous with oxyhemoglobin   Result Value Ref Range    pH Venous 7.27 (L) 7.32 - 7.43    pCO2 Venous 74 (H) 40 - 50 mm Hg    pO2 Venous 41 25 - 47 mm Hg    Bicarbonate Venous 34 (H) 21 - 28 mmol/L    FIO2 25     Oxyhemoglobin Venous 77 (H) 70 - 75 %    Base Excess/Deficit (+/-) 6.2 (H) -7.7 - 1.9 mmol/L   CBC with platelets differential    Narrative    The following orders were created for panel order CBC with platelets differential.  Procedure                               Abnormality         Status                     ---------                               -----------         ------                     CBC with platelets and d...[[id number]]  Abnormal            Final result               Manual Differential[[id number]]          Abnormal            Final result                 Please view results for these tests on the individual orders.   CBC with platelets and differential   Result Value Ref Range    WBC Count 11.4 (H) 4.0 - 11.0 10e3/uL    RBC Count 2.67 (L) 3.80 - 5.20 10e6/uL    Hemoglobin 8.2 (L) 11.7 - 15.7 g/dL    Hematocrit 27.6 (L) 35.0 - 47.0 %     (H) 78 - 100 fL    MCH 30.7 26.5 - 33.0 pg    MCHC 29.7 (L) 31.5 - 36.5 g/dL    RDW 15.7 (H) 10.0 - 15.0 %    Platelet Count 203 150 - 450 10e3/uL   Manual Differential   Result Value Ref Range    % Neutrophils 89 %    % Lymphocytes 1 %    % Monocytes 6 %    % Eosinophils 0 %    % Basophils 0 %    % Metamyelocytes 2 %    % Myelocytes 2 %    Absolute Neutrophils 10.1 (H) 1.6 - 8.3 10e3/uL    Absolute Lymphocytes 0.1 (L) 0.8 - 5.3 10e3/uL    Absolute Monocytes 0.7 0.0 - 1.3 10e3/uL    Absolute Eosinophils 0.0 0.0 - 0.7 10e3/uL    Absolute Basophils 0.0 0.0 - 0.2 10e3/uL    Absolute Metamyelocytes 0.2 (H) <=0.0 10e3/uL    Absolute Myelocytes 0.2 (H) <=0.0 10e3/uL    RBC Morphology Confirmed RBC Indices     Platelet Assessment  Automated Count Confirmed. Platelet morphology is normal.     Automated Count Confirmed. Platelet morphology is normal.   ABO/Rh  type and screen    Narrative    The following orders were created for panel order ABO/Rh type and screen.  Procedure                               Abnormality         Status                     ---------                               -----------         ------                     Adult Type and Screen[[id number]]                            In process                   Please view results for these tests on the individual orders.   Glucose by meter   Result Value Ref Range    GLUCOSE BY METER POCT 202 (H) 70 - 99 mg/dL   Potassium   Result Value Ref Range    Potassium 5.7 (H) 3.4 - 5.3 mmol/L

## 2022-07-05 NOTE — PLAN OF CARE
Plan of Care Reviewed With: patient, daughter, grandchild(ray)     Overall Patient Progress: no change    Major Shift Events: A&Ox4. Whispers, neuro grossly intact. Pain controled with prn oxy and robaxin. BiPAP 18/5 25%, CO2 remains elevated. SR. Pulses present with doppler. A line placed today, levo titrated for MAPs >65. NJ with TF. Lasix administered, with response remains oliguric, team aware. K+ persistently >5.5, team notified and aware TF formula changed, insulin increased, and lokelma added.   Plan: Wean levo as able. Pulm hygeine.  For vital signs and complete assessments, please see documentation flowsheets.

## 2022-07-05 NOTE — CONSULTS
Nephrology Initial Consult  July 5, 2022      Sofie Rodriguez MRN:[id number] YOB: 1960  Date of Admission:6/28/2022  Primary care provider: Vinny Berrios  Requesting physician: Sue Sunshine MD    ASSESSMENT AND RECOMMENDATIONS:   BACILIO-Normal baseline renal fx historically and at time of surgery.  No renal imaging but low suspicion of obstruction so will consider if she does not show improvement.  BACILIO is likely hypoperfusion with ~2.5h of bypass time and tacrolimus although levels have been within range.  Today urine Na is low although this is difficult to interpret with tacrolimus as it can cause renal vasoconstriction.  Cr trend is appearing to plateau, no overt need for RRT today although K is mildly up.  Will manage this medically for now and continue to monitor.  UA initially showed hyaline casts on 6/28, now with some protein which is likely tubular range and relatively concentrated SG at 1.030 likely related to continued borderline BP's.  Noted that she did not get contrast for thrombectomy on 7/2.   -BACILIO, likely hypoperfusion and need for tacro.     -Continue to avoid nephrotoxins, norepi being weaned but would keep SBP >100.     -No need to consider RRT today, given 40mg lasix this afternoon with goal of K excretion and to be net even (not trying to be negative while on pressor).  If K is still up this afternoon/evening we can re-dose lasix and increase dose to 80mg.      Volume-Edematous and net positive 5L over the past 5 days, likely hypervolemic although CXR is reasonable.  Planning to give lasix with the intent of being net even since she is still on pressor although dose is improving on a daily basis.      Electrolytes-K 5.5, bicarb 30 which likely is some appropriate compensation for hypercapnia.      BMD-Ca up at 11.9 but likely in err as iCal is normal at 4.4, will follow.  No issues with Mg/Phos.      Lung Tx-On Tacro, levels have been within range, last level 7.7.  Has  hypercapnia despite transplant and reasonable CXR/oxygenation, team expects this to improve with time.      Anemia-Hgb 7.9, acute management per team.      Nutrition-Started on TF, can change to renal formula.    Time spent: 30 minutes on this date of encounter for chart review, physical exam, medical decision making and co-ordination of care.     Seen and discussed with Dr Arboleda    Recommendations were communicated to primary team via verbal communication.       RUFUS Cedeño CNS  Clinical Nurse Specialist  [phone]    REASON FOR CONSULT: Requested to evaluate 62 yof for management of BACILIO post BSLT on 7/2.    HISTORY OF PRESENT ILLNESS:  Sofie Rodriguez is a 62 yom with hx of HTN, Hep C, osetopenia, previous polysubstance use (stopped 2019) and severe COPD who is s/p BSLT on 6/28/2022.  Had ~2.5h of bypass time, was uncomplicated but now has post op BACILIO in setting of continued need for vasopressors thought to be due to vasoplegia.  Had radial artery thrombus requiring thrombectomy on 7/2.  Nephrology consulted for BACILIO with mild hyperkalemia and oliguric UOP.      PAST MEDICAL HISTORY:  Past Medical History:   Diagnosis Date     CHF (congestive heart failure) (H)      COPD (chronic obstructive pulmonary disease) (H)      Hepatitis 2017    Hep C, [facility]     HTN (hypertension)      Osteopenia        Past Surgical History:   Procedure Laterality Date     COLONOSCOPY  2015     CV CORONARY ANGIOGRAM N/A 6/30/2021    Procedure: CV CORONARY ANGIOGRAM;  Surgeon: Alexander Cuellar MD;  Location: [facility]     CV RIGHT HEART CATH MEASUREMENTS RECORDED N/A 6/30/2021    Procedure: CV RIGHT HEART CATH;  Surgeon: Alexander Cuellar MD;  [facility]     ENT SURGERY  1974    tonsillectomy     ESOPHAGOGASTRODUODENOSCOPY, WITH NASOGASTRIC TUBE INSERTION N/A 7/1/2022    Procedure: ESOPHAGOGASTRODUODENOSCOPY, WITH NASOJEJUNAL TUBE INSERTION;  Surgeon: Ozzy Nickerson MD;   Location: [facility]     HAND SURGERY       LEEP TX, CERVICAL  04/07/2017    HECTOR III     LYMPH NODE BIOPSY Left 2005    Left axilla, benign- [facility]     THROMBECTOMY UPPER EXTREMITY Left 7/2/2022    Procedure: LEFT RADIAL ARM THROMBECTOMY;  Surgeon: Christie Graham MD;  Location: [facility]     TRANSPLANT LUNG RECIPIENT SINGLE X2 Bilateral 6/28/2022    Procedure: Clamshell Incision, Bilateral Sequential Lung Transplant, On Cardiopulmonary Bypass, Flexible Bronchoscopy;  Surgeon: Sue Sunshine MD;  Location: [facility]        MEDICATIONS:  PTA Meds  Prior to Admission medications    Medication Sig Last Dose Taking? Auth Provider Long Term End Date   albuterol (PROAIR HFA/PROVENTIL HFA/VENTOLIN HFA) 108 (90 BASE) MCG/ACT Inhaler Inhale 2 puffs into the lungs every 6 hours as needed for shortness of breath / dyspnea or wheezing 6/26/2022 Yes Reported, Patient Yes    aspirin (ASA) 81 MG chewable tablet Take 81 mg by mouth daily 6/28/2022 Yes Unknown, Entered By History     CALCIUM-VITAMIN D PO Take 1 tablet by mouth daily 6/28/2022 at 0930 Yes Unknown, Entered By History     fluticasone-vilanterol (BREO ELLIPTA) 100-25 MCG/INH inhaler Inhale 1 puff into the lungs daily 25 mcg 6/28/2022 at 0930 Yes Reported, Patient     furosemide (LASIX) 20 MG tablet Take 1 tablet (20 mg) by mouth daily 6/28/2022 at 0930 Yes Claribel Franks MD Yes    ipratropium - albuterol 0.5 mg/2.5 mg/3 mL (DUONEB) 0.5-2.5 (3) MG/3ML neb solution Inhale 1 vial into the lungs every 4 hours as needed for shortness of breath / dyspnea 6/27/2022 Yes Unknown, Entered By History Yes    Multiple Vitamin (QUINTABS) TABS Take 1 tablet by mouth daily 6/28/2022 at 0930 Yes Reported, Patient     umeclidinium (INCRUSE ELLIPTA) 62.5 MCG/INH inhaler Inhale 1 puff into the lungs daily 62.5mcg 6/28/2022 at 0930 Yes Reported, Patient        Current Meds    acetaminophen  975 mg Oral Q8H     acetylcysteine  2 mL Nebulization 4x Daily     aspirin  81 mg Oral Daily      [START ON 2022] calcium carbonate 600 mg-vitamin D 400 units  1 tablet Oral BID w/meals     cefTAZidime  2 g Intravenous Q24H     heparin ANTICOAGULANT  5,000 Units Subcutaneous Q8H TONY     insulin aspart  1-12 Units Subcutaneous Q4H     insulin glargine  35 Units Subcutaneous QAM AC     levalbuterol  1.25 mg Nebulization 4x Daily     lidocaine  2 patch Transdermal Q24H     lidocaine   Transdermal Q8H TONY     methocarbamol  500 mg Oral 4x Daily     midodrine  20 mg Oral Q8H     multivitamins w/minerals  15 mL Per Feeding Tube Daily     mycophenolate  1,500 mg Oral BID    Or     mycophenolate  1,500 mg Oral or NG Tube BID     nystatin  1,000,000 Units Swish & Swallow 4x Daily     pantoprazole  40 mg Oral or Feeding Tube Daily     polyethylene glycol  17 g Oral BID     prednisoLONE  17.5 mg Oral or NG Tube BID     protein modular  1 packet Per Feeding Tube Daily     senna-docusate  2 tablet Oral BID     sodium chloride (PF)  3 mL Intracatheter Q8H     tacrolimus  4 mg Oral BID IS     [START ON 2022] valGANciclovir  450 mg Oral or NG Tube Once per day on Mon Thu     Infusion Meds    dextrose       heparin for  units in  mL (1 unit/mL for Interventional Radiology) 3 Units/hr (22 1326)     norepinephrine 0.08 mcg/kg/min (22 0700)       ALLERGIES:    No Known Allergies    REVIEW OF SYSTEMS:  A 10 point review of systems was negative except as noted above.    SOCIAL HISTORY:   Social History     Socioeconomic History     Marital status: Single     Spouse name: Not on file     Number of children: Not on file     Years of education: Not on file     Highest education level: Not on file   Occupational History     Not on file   Tobacco Use     Smoking status: Former Smoker     Years: 30.00     Types: Cigarettes     Quit date: 2020     Years since quittin.6     Smokeless tobacco: Never Used   Substance and Sexual Activity     Alcohol use: Not Currently     Drug use: Not Currently      "Types: Marijuana, Methamphetamines     Comment: hx:marijuana and methamphetamine-quit both unsure ?  2-3 yrs ago     Sexual activity: Not on file   Other Topics Concern     Parent/sibling w/ CABG, MI or angioplasty before 65F 55M? Not Asked   Social History Narrative     Not on file     Social Determinants of Health     Financial Resource Strain: Not on file   Food Insecurity: Not on file   Transportation Needs: Not on file   Physical Activity: Not on file   Stress: Not on file   Social Connections: Not on file   Intimate Partner Violence: Not on file   Housing Stability: Not on file     Reviewed with patient, noncontributory social hx.     FAMILY MEDICAL HISTORY:   Reviewed with pt, noncontributory.     PHYSICAL EXAM:   Temp  Av  F (37.2  C)  Min: 91.6  F (33.1  C)  Max: 100.8  F (38.2  C)  Arterial Line BP  Min: 68/56  Max: 263/71  Arterial Line MAP (mmHg)  Av.4 mmHg  Min: 50 mmHg  Max: 200 mmHg      Pulse  Av.7  Min: 66  Max: 130 Resp  Av  Min: 10  Max: 44  FiO2 (%)  Av.4 %  Min: 21 %  Max: 100 %  SpO2  Av.9 %  Min: 82 %  Max: 100 %    CVP (mmHg): 10 mmHg  /55   Pulse 80   Temp 98.1  F (36.7  C) (Axillary)   Resp 24   Ht 1.575 m (5' 2\")   Wt 76.2 kg (167 lb 15.9 oz)   SpO2 100%   BMI 30.73 kg/m     Date 22 07 - 22 0659   Shift 0377-7293 4279-0647 0948-6184 24 Hour Total   INTAKE   I.V. 10.73   10.73   Enteral 45   45   Shift Total(mL/kg) 55.73(0.73)   55.73(0.73)   OUTPUT   Urine 156   156   Shift Total(mL/kg) 156(2.05)   156(2.05)   Weight (kg) 76.2 76.2 76.2 76.2      Admit Weight: 144.9 kg (319 lb 7.1 oz)     GENERAL APPEARANCE: On Bipap, in mild distress.    EYES: No scleral icterus  NECK:  No JVD  Pulmonary: lungs clear to auscultation with equal breath sounds bilaterally, no clubbing or cyanosis  CV: Regular rhythm, normal rate, no rub   - Edema+2 generalized.   GI: soft, nontender, normal bowel sounds  MS: no evidence of inflammation in joints, no " muscle tenderness  : + Dong  SKIN: no rash, warm, dry  NEURO: Answering questions appropriately, no focal deficits.      [facility]  Recent Labs   Lab 07/05/22  1214 07/05/22  1207 07/05/22  1021 07/05/22  1002 07/05/22  0812 07/05/22  0652 07/05/22  0348 07/05/22  0345 07/04/22  0327 07/04/22  0323 07/03/22  2346 07/03/22  2231 07/03/22  0334 07/03/22  0333 06/30/22  0401 06/30/22  0355 06/29/22  2351 06/29/22  2341 06/29/22  0250 06/29/22  0248   NA  --  134*  --   --   --   --   --  136  --   --   --  144  --  142   < > 141  --  140  --  139   POTASSIUM  --  5.5*  --  5.6*  --  5.7*  --  5.8*  --   --   --  4.4  --  4.2   < > 4.0  --  4.1  --  4.0   CHLORIDE  --  99  --   --   --   --   --  101  --   --   --  106  --  107   < > 107  --  105  --  100   CO2  --  30*  --   --   --   --   --  31*  --   --   --  31*  --  32*   < > 26  --  26  --  24   ANIONGAP  --  5*  --   --   --   --   --  4*  --   --   --  7  --  3*   < > 8  --  9  --  15   * 203* 205*  --  211*  --    < > 211*   < >  --    < > 211*   < > 238*   < > 115*   < > 190*   < > 159*   BUN  --  75.4*  --   --   --   --   --  73.6*  --   --   --  59.4*  --  49.7*   < > 19.6  --  20.6  --  16.5   CR  --  2.09*  --   --   --   --   --  2.09*  --   --   --  1.96*  --  1.80*   < > 0.98*  --  1.00*  --  0.94   GFRESTIMATED  --  26*  --   --   --   --   --  26*  --   --   --  29*  --  32*   < > 66  --  64  --  69   ESTUARDO  --  11.9*  --   --   --   --   --  7.7*  --   --   --  7.5*  --  7.6*   < > 7.8*  --  7.9*  --  8.1*   MAG  --  3.0*  --   --   --   --   --  3.2*  --   --   --  2.9*  --  2.9*   < > 2.9*  --  2.0  --  2.3   PHOS  --  3.3  --   --   --   --   --  3.6  --   --   --  2.6  --  3.0   < > 3.8  --  3.9   < > 1.6*   PROTTOTAL  --   --   --   --   --   --   --   --   --  4.4*  --   --   --   --   --  4.0*  --  4.1*  --  3.7*   ALBUMIN  --   --   --   --   --   --   --   --   --  2.6*  --   --   --   --   --  2.6*  --  2.8*  --  2.3*    BILITOTAL  --   --   --   --   --   --   --   --   --  <0.2  --   --   --   --   --  0.3  --  0.3  --  0.9   ALKPHOS  --   --   --   --   --   --   --   --   --  60  --   --   --   --   --  35  --  40  --  36   AST  --   --   --   --   --   --   --   --   --  20  --   --   --   --   --  65*  --  78*  --  143*   ALT  --   --   --   --   --   --   --   --   --  24  --   --   --   --   --  23  --  27  --  33    < > = values in this interval not displayed.     CBC  Recent Labs   Lab 07/05/22  0807 07/05/22  0345 07/04/22  0323 07/03/22  0333   HGB 7.9* 8.2* 8.6* 8.7*   WBC 9.5 11.4* 12.2* 13.9*   RBC 2.56* 2.67* 2.76* 2.84*   HCT 26.7* 27.6* 28.8* 28.8*   * 103* 104* 101*   MCH 30.9 30.7 31.2 30.6   MCHC 29.6* 29.7* 29.9* 30.2*   RDW 15.6* 15.7* 15.8* 15.6*    203 173 151     INR  Recent Labs   Lab 06/29/22  0248 06/29/22  0007   INR 1.33* 1.60*   PTT 52* 32     ABG  Recent Labs   Lab 07/05/22  1207 07/05/22  0345 07/04/22  1944 07/04/22  1158 07/02/22  0440 07/02/22  0241 06/30/22  1205 06/30/22  1037 06/30/22  0759 06/30/22  0356 06/29/22  2341 06/29/22  2339   PH  --   --   --   --   --  7.19*  --  7.37  --  7.54*  --  7.48*   PCO2  --   --   --   --   --  77*  --  51*  --  34*  --  40   PO2  --   --   --   --   --  93  --  133*  --  184*  --  162*   HCO3  --   --   --   --   --  29*  --  29*  --  29*  --  29*   O2PER 4 25 25 21   < > 30   < > 40   < > 40   < > 40    < > = values in this interval not displayed.      URINE STUDIES  Recent Labs   Lab Test 07/05/22  1004 06/28/22  1309 06/14/21  0939   COLOR Yellow Straw Yellow   APPEARANCE Slightly Cloudy* Clear Slightly Cloudy   URINEGLC Negative Negative Negative   URINEBILI Negative Negative Negative   URINEKETONE Trace* Negative Negative   SG 1.030 1.004 1.018   UBLD Small* Negative Negative   URINEPH 5.5 5.0 5.0   PROTEIN 100 * Negative Negative   NITRITE Negative Negative Negative   LEUKEST Negative Negative Trace*   RBCU 15* 0 <1   WBCU 5 1 8*      No lab results found.  PTH  No lab results found.  IRON STUDIES  No lab results found.

## 2022-07-05 NOTE — PLAN OF CARE
Plan of Care Reviewed With: patient     Neuro: A&Ox4. No neuro deficits. Difficulty speaking. New all over body muscle twitching. Afebrile. Pain controlled with PRN pain medication.   Resp: BiPAP 18/5 25% throughout the shift.   CV: Sinus rhythm. All pulses present with doppler. Norepi increased to maintain MAP >65.   GI/: NJ with tube feedings. Dong with diminished urine output (~15ml/hr). Loose incontinent stools. Regular diet.   Labs: K 5.8. Creatinine 2.09.     Plan: Obtain sputum culture. Wean norepi as able. Pulmonary hygiene.     For vital signs and complete assessments, please see documentation flowsheets.

## 2022-07-06 ENCOUNTER — APPOINTMENT (OUTPATIENT)
Dept: GENERAL RADIOLOGY | Facility: CLINIC | Age: 62
DRG: 007 | End: 2022-07-06
Attending: NURSE PRACTITIONER
Payer: MEDICARE

## 2022-07-06 ENCOUNTER — APPOINTMENT (OUTPATIENT)
Dept: SPEECH THERAPY | Facility: CLINIC | Age: 62
DRG: 007 | End: 2022-07-06
Attending: THORACIC SURGERY (CARDIOTHORACIC VASCULAR SURGERY)
Payer: MEDICARE

## 2022-07-06 ENCOUNTER — APPOINTMENT (OUTPATIENT)
Dept: GENERAL RADIOLOGY | Facility: CLINIC | Age: 62
DRG: 007 | End: 2022-07-06
Attending: THORACIC SURGERY (CARDIOTHORACIC VASCULAR SURGERY)
Payer: MEDICARE

## 2022-07-06 ENCOUNTER — APPOINTMENT (OUTPATIENT)
Dept: OCCUPATIONAL THERAPY | Facility: CLINIC | Age: 62
DRG: 007 | End: 2022-07-06
Attending: THORACIC SURGERY (CARDIOTHORACIC VASCULAR SURGERY)
Payer: MEDICARE

## 2022-07-06 ENCOUNTER — APPOINTMENT (OUTPATIENT)
Dept: PHYSICAL THERAPY | Facility: CLINIC | Age: 62
DRG: 007 | End: 2022-07-06
Attending: THORACIC SURGERY (CARDIOTHORACIC VASCULAR SURGERY)
Payer: MEDICARE

## 2022-07-06 LAB
ALP SERPL-CCNC: 59 U/L (ref 35–104)
ALT SERPL W P-5'-P-CCNC: 22 U/L (ref 10–35)
ANION GAP SERPL CALCULATED.3IONS-SCNC: 12 MMOL/L (ref 7–15)
ANION GAP SERPL CALCULATED.3IONS-SCNC: 12 MMOL/L (ref 7–15)
ANION GAP SERPL CALCULATED.3IONS-SCNC: 3 MMOL/L (ref 7–15)
ANION GAP SERPL CALCULATED.3IONS-SCNC: 5 MMOL/L (ref 7–15)
ANION GAP SERPL CALCULATED.3IONS-SCNC: 6 MMOL/L (ref 7–15)
ANION GAP SERPL CALCULATED.3IONS-SCNC: 8 MMOL/L (ref 7–15)
AST SERPL W P-5'-P-CCNC: 24 U/L (ref 10–35)
ATRIAL RATE - MUSE: 82 BPM
BASE EXCESS BLDA CALC-SCNC: 3.3 MMOL/L (ref -9–1.8)
BASE EXCESS BLDA CALC-SCNC: 4 MMOL/L (ref -9–1.8)
BASE EXCESS BLDA CALC-SCNC: 6.5 MMOL/L (ref -9–1.8)
BASE EXCESS BLDA CALC-SCNC: 7.6 MMOL/L (ref -9–1.8)
BASOPHILS # BLD MANUAL: 0 10E3/UL (ref 0–0.2)
BASOPHILS NFR BLD MANUAL: 0 %
BILIRUB DIRECT SERPL-MCNC: <0.2 MG/DL (ref 0–0.3)
BILIRUB SERPL-MCNC: <0.2 MG/DL
BUN SERPL-MCNC: 82.7 MG/DL (ref 8–23)
BUN SERPL-MCNC: 84.6 MG/DL (ref 8–23)
BUN SERPL-MCNC: 85.5 MG/DL (ref 8–23)
BUN SERPL-MCNC: 88 MG/DL (ref 8–23)
BUN SERPL-MCNC: 89 MG/DL (ref 8–23)
BUN SERPL-MCNC: 91.5 MG/DL (ref 8–23)
CALCIUM SERPL-MCNC: 8 MG/DL (ref 8.8–10.2)
CALCIUM SERPL-MCNC: 8 MG/DL (ref 8.8–10.2)
CALCIUM SERPL-MCNC: 8.1 MG/DL (ref 8.8–10.2)
CALCIUM SERPL-MCNC: 8.1 MG/DL (ref 8.8–10.2)
CALCIUM SERPL-MCNC: 8.2 MG/DL (ref 8.8–10.2)
CALCIUM SERPL-MCNC: 8.5 MG/DL (ref 8.8–10.2)
CHLORIDE SERPL-SCNC: 90 MMOL/L (ref 98–107)
CHLORIDE SERPL-SCNC: 91 MMOL/L (ref 98–107)
CHLORIDE SERPL-SCNC: 97 MMOL/L (ref 98–107)
CHLORIDE SERPL-SCNC: 97 MMOL/L (ref 98–107)
CHLORIDE SERPL-SCNC: 98 MMOL/L (ref 98–107)
CHLORIDE SERPL-SCNC: 98 MMOL/L (ref 98–107)
CREAT SERPL-MCNC: 1.97 MG/DL (ref 0.51–0.95)
CREAT SERPL-MCNC: 1.98 MG/DL (ref 0.51–0.95)
CREAT SERPL-MCNC: 2.01 MG/DL (ref 0.51–0.95)
CREAT SERPL-MCNC: 2.03 MG/DL (ref 0.51–0.95)
CREAT SERPL-MCNC: 2.03 MG/DL (ref 0.51–0.95)
CREAT SERPL-MCNC: 2.16 MG/DL (ref 0.51–0.95)
DEPRECATED HCO3 PLAS-SCNC: 28 MMOL/L (ref 22–29)
DEPRECATED HCO3 PLAS-SCNC: 29 MMOL/L (ref 22–29)
DEPRECATED HCO3 PLAS-SCNC: 30 MMOL/L (ref 22–29)
DEPRECATED HCO3 PLAS-SCNC: 30 MMOL/L (ref 22–29)
DEPRECATED HCO3 PLAS-SCNC: 32 MMOL/L (ref 22–29)
DEPRECATED HCO3 PLAS-SCNC: 32 MMOL/L (ref 22–29)
DIASTOLIC BLOOD PRESSURE - MUSE: NORMAL MMHG
EOSINOPHIL # BLD MANUAL: 0 10E3/UL (ref 0–0.7)
EOSINOPHIL NFR BLD MANUAL: 0 %
ERYTHROCYTE [DISTWIDTH] IN BLOOD BY AUTOMATED COUNT: 15.2 % (ref 10–15)
GFR SERPL CREATININE-BSD FRML MDRD: 25 ML/MIN/1.73M2
GFR SERPL CREATININE-BSD FRML MDRD: 27 ML/MIN/1.73M2
GFR SERPL CREATININE-BSD FRML MDRD: 27 ML/MIN/1.73M2
GFR SERPL CREATININE-BSD FRML MDRD: 28 ML/MIN/1.73M2
GLUCOSE BLDC GLUCOMTR-MCNC: 158 MG/DL (ref 70–99)
GLUCOSE BLDC GLUCOMTR-MCNC: 159 MG/DL (ref 70–99)
GLUCOSE BLDC GLUCOMTR-MCNC: 168 MG/DL (ref 70–99)
GLUCOSE BLDC GLUCOMTR-MCNC: 188 MG/DL (ref 70–99)
GLUCOSE BLDC GLUCOMTR-MCNC: 202 MG/DL (ref 70–99)
GLUCOSE BLDC GLUCOMTR-MCNC: 204 MG/DL (ref 70–99)
GLUCOSE BLDC GLUCOMTR-MCNC: 218 MG/DL (ref 70–99)
GLUCOSE BLDC GLUCOMTR-MCNC: 221 MG/DL (ref 70–99)
GLUCOSE BLDC GLUCOMTR-MCNC: 230 MG/DL (ref 70–99)
GLUCOSE BLDC GLUCOMTR-MCNC: 244 MG/DL (ref 70–99)
GLUCOSE BLDC GLUCOMTR-MCNC: 255 MG/DL (ref 70–99)
GLUCOSE SERPL-MCNC: 162 MG/DL (ref 70–99)
GLUCOSE SERPL-MCNC: 169 MG/DL (ref 70–99)
GLUCOSE SERPL-MCNC: 200 MG/DL (ref 70–99)
GLUCOSE SERPL-MCNC: 218 MG/DL (ref 70–99)
GLUCOSE SERPL-MCNC: 237 MG/DL (ref 70–99)
GLUCOSE SERPL-MCNC: 244 MG/DL (ref 70–99)
HCO3 BLD-SCNC: 31 MMOL/L (ref 21–28)
HCO3 BLD-SCNC: 32 MMOL/L (ref 21–28)
HCO3 BLD-SCNC: 35 MMOL/L (ref 21–28)
HCO3 BLD-SCNC: 36 MMOL/L (ref 21–28)
HCT VFR BLD AUTO: 25 % (ref 35–47)
HGB BLD-MCNC: 7.4 G/DL (ref 11.7–15.7)
INTERPRETATION ECG - MUSE: NORMAL
LACTATE SERPL-SCNC: 1.3 MMOL/L (ref 0.7–2)
LYMPHOCYTES # BLD MANUAL: 0.7 10E3/UL (ref 0.8–5.3)
LYMPHOCYTES NFR BLD MANUAL: 8 %
MAGNESIUM SERPL-MCNC: 2.9 MG/DL (ref 1.7–2.3)
MCH RBC QN AUTO: 30.7 PG (ref 26.5–33)
MCHC RBC AUTO-ENTMCNC: 29.6 G/DL (ref 31.5–36.5)
MCV RBC AUTO: 104 FL (ref 78–100)
MONOCYTES # BLD MANUAL: 0.5 10E3/UL (ref 0–1.3)
MONOCYTES NFR BLD MANUAL: 5 %
MYELOCYTES # BLD MANUAL: 0.2 10E3/UL
MYELOCYTES NFR BLD MANUAL: 2 %
NEUTROPHILS # BLD MANUAL: 7.8 10E3/UL (ref 1.6–8.3)
NEUTROPHILS NFR BLD MANUAL: 85 %
NRBC # BLD AUTO: 0.1 10E3/UL
NRBC BLD MANUAL-RTO: 1 %
O2/TOTAL GAS SETTING VFR VENT: 2 %
O2/TOTAL GAS SETTING VFR VENT: 30 %
O2/TOTAL GAS SETTING VFR VENT: 35 %
O2/TOTAL GAS SETTING VFR VENT: 40 %
P AXIS - MUSE: 68 DEGREES
PCO2 BLD: 65 MM HG (ref 35–45)
PCO2 BLD: 76 MM HG (ref 35–45)
PCO2 BLD: 78 MM HG (ref 35–45)
PCO2 BLD: 80 MM HG (ref 35–45)
PH BLD: 7.24 [PH] (ref 7.35–7.45)
PH BLD: 7.25 [PH] (ref 7.35–7.45)
PH BLD: 7.27 [PH] (ref 7.35–7.45)
PH BLD: 7.28 [PH] (ref 7.35–7.45)
PHOSPHATE SERPL-MCNC: 3.5 MG/DL (ref 2.5–4.5)
PLAT MORPH BLD: ABNORMAL
PLATELET # BLD AUTO: 207 10E3/UL (ref 150–450)
PO2 BLD: 113 MM HG (ref 80–105)
PO2 BLD: 150 MM HG (ref 80–105)
PO2 BLD: 152 MM HG (ref 80–105)
PO2 BLD: 166 MM HG (ref 80–105)
POTASSIUM BLD-SCNC: 5.2 MMOL/L (ref 3.4–5.3)
POTASSIUM SERPL-SCNC: 4.9 MMOL/L (ref 3.4–5.3)
POTASSIUM SERPL-SCNC: 4.9 MMOL/L (ref 3.4–5.3)
POTASSIUM SERPL-SCNC: 5 MMOL/L (ref 3.4–5.3)
POTASSIUM SERPL-SCNC: 5.2 MMOL/L (ref 3.4–5.3)
POTASSIUM SERPL-SCNC: 5.4 MMOL/L (ref 3.4–5.3)
POTASSIUM SERPL-SCNC: 5.5 MMOL/L (ref 3.4–5.3)
POTASSIUM SERPL-SCNC: 6 MMOL/L (ref 3.4–5.3)
PR INTERVAL - MUSE: 116 MS
PROT SERPL-MCNC: 4.4 G/DL (ref 6.4–8.3)
QRS DURATION - MUSE: 64 MS
QT - MUSE: 382 MS
QTC - MUSE: 446 MS
R AXIS - MUSE: 66 DEGREES
RBC # BLD AUTO: 2.41 10E6/UL (ref 3.8–5.2)
RBC MORPH BLD: ABNORMAL
SARS-COV-2 RNA RESP QL NAA+PROBE: NEGATIVE
SODIUM SERPL-SCNC: 132 MMOL/L (ref 136–145)
SODIUM SERPL-SCNC: 133 MMOL/L (ref 136–145)
SODIUM SERPL-SCNC: 134 MMOL/L (ref 136–145)
SODIUM SERPL-SCNC: 135 MMOL/L (ref 136–145)
SYSTOLIC BLOOD PRESSURE - MUSE: NORMAL MMHG
T AXIS - MUSE: 78 DEGREES
TACROLIMUS BLD-MCNC: 4.8 UG/L (ref 5–15)
TME LAST DOSE: ABNORMAL H
TME LAST DOSE: ABNORMAL H
TSH SERPL DL<=0.005 MIU/L-ACNC: 1.04 UIU/ML (ref 0.3–4.2)
UFH PPP CHRO-ACNC: <0.1 IU/ML
VENTRICULAR RATE- MUSE: 82 BPM
WBC # BLD AUTO: 9.2 10E3/UL (ref 4–11)

## 2022-07-06 PROCEDURE — 85014 HEMATOCRIT: CPT | Performed by: SURGERY

## 2022-07-06 PROCEDURE — 71045 X-RAY EXAM CHEST 1 VIEW: CPT | Mod: 26 | Performed by: RADIOLOGY

## 2022-07-06 PROCEDURE — 82248 BILIRUBIN DIRECT: CPT | Performed by: STUDENT IN AN ORGANIZED HEALTH CARE EDUCATION/TRAINING PROGRAM

## 2022-07-06 PROCEDURE — 250N000013 HC RX MED GY IP 250 OP 250 PS 637

## 2022-07-06 PROCEDURE — 250N000011 HC RX IP 250 OP 636

## 2022-07-06 PROCEDURE — 250N000013 HC RX MED GY IP 250 OP 250 PS 637: Performed by: STUDENT IN AN ORGANIZED HEALTH CARE EDUCATION/TRAINING PROGRAM

## 2022-07-06 PROCEDURE — 250N000013 HC RX MED GY IP 250 OP 250 PS 637: Performed by: SURGERY

## 2022-07-06 PROCEDURE — 250N000011 HC RX IP 250 OP 636: Performed by: SURGERY

## 2022-07-06 PROCEDURE — 200N000002 HC R&B ICU UMMC

## 2022-07-06 PROCEDURE — 84132 ASSAY OF SERUM POTASSIUM: CPT | Performed by: INTERNAL MEDICINE

## 2022-07-06 PROCEDURE — 99232 SBSQ HOSP IP/OBS MODERATE 35: CPT | Mod: FS | Performed by: CLINICAL NURSE SPECIALIST

## 2022-07-06 PROCEDURE — 250N000011 HC RX IP 250 OP 636: Performed by: STUDENT IN AN ORGANIZED HEALTH CARE EDUCATION/TRAINING PROGRAM

## 2022-07-06 PROCEDURE — 999N000015 HC STATISTIC ARTERIAL MONITORING DAILY

## 2022-07-06 PROCEDURE — 82310 ASSAY OF CALCIUM: CPT | Performed by: STUDENT IN AN ORGANIZED HEALTH CARE EDUCATION/TRAINING PROGRAM

## 2022-07-06 PROCEDURE — 84132 ASSAY OF SERUM POTASSIUM: CPT | Performed by: STUDENT IN AN ORGANIZED HEALTH CARE EDUCATION/TRAINING PROGRAM

## 2022-07-06 PROCEDURE — 250N000012 HC RX MED GY IP 250 OP 636 PS 637: Performed by: SURGERY

## 2022-07-06 PROCEDURE — 250N000009 HC RX 250: Performed by: SURGERY

## 2022-07-06 PROCEDURE — 94681 O2 UPTK CO2 OUTP % O2 XTRC: CPT

## 2022-07-06 PROCEDURE — 84155 ASSAY OF PROTEIN SERUM: CPT | Performed by: STUDENT IN AN ORGANIZED HEALTH CARE EDUCATION/TRAINING PROGRAM

## 2022-07-06 PROCEDURE — 84460 ALANINE AMINO (ALT) (SGPT): CPT | Performed by: STUDENT IN AN ORGANIZED HEALTH CARE EDUCATION/TRAINING PROGRAM

## 2022-07-06 PROCEDURE — 94668 MNPJ CHEST WALL SBSQ: CPT

## 2022-07-06 PROCEDURE — 94660 CPAP INITIATION&MGMT: CPT

## 2022-07-06 PROCEDURE — 80048 BASIC METABOLIC PNL TOTAL CA: CPT | Performed by: STUDENT IN AN ORGANIZED HEALTH CARE EDUCATION/TRAINING PROGRAM

## 2022-07-06 PROCEDURE — 250N000012 HC RX MED GY IP 250 OP 636 PS 637: Performed by: INTERNAL MEDICINE

## 2022-07-06 PROCEDURE — 92526 ORAL FUNCTION THERAPY: CPT | Mod: GN

## 2022-07-06 PROCEDURE — 258N000001 HC RX 258: Performed by: STUDENT IN AN ORGANIZED HEALTH CARE EDUCATION/TRAINING PROGRAM

## 2022-07-06 PROCEDURE — 80197 ASSAY OF TACROLIMUS: CPT | Performed by: SURGERY

## 2022-07-06 PROCEDURE — 94667 MNPJ CHEST WALL 1ST: CPT

## 2022-07-06 PROCEDURE — 85007 BL SMEAR W/DIFF WBC COUNT: CPT | Performed by: SURGERY

## 2022-07-06 PROCEDURE — 99291 CRITICAL CARE FIRST HOUR: CPT | Mod: 24 | Performed by: STUDENT IN AN ORGANIZED HEALTH CARE EDUCATION/TRAINING PROGRAM

## 2022-07-06 PROCEDURE — 84450 TRANSFERASE (AST) (SGOT): CPT | Performed by: STUDENT IN AN ORGANIZED HEALTH CARE EDUCATION/TRAINING PROGRAM

## 2022-07-06 PROCEDURE — 83605 ASSAY OF LACTIC ACID: CPT | Performed by: ANESTHESIOLOGY

## 2022-07-06 PROCEDURE — 84100 ASSAY OF PHOSPHORUS: CPT | Performed by: INTERNAL MEDICINE

## 2022-07-06 PROCEDURE — 250N000011 HC RX IP 250 OP 636: Performed by: THORACIC SURGERY (CARDIOTHORACIC VASCULAR SURGERY)

## 2022-07-06 PROCEDURE — 82247 BILIRUBIN TOTAL: CPT | Performed by: STUDENT IN AN ORGANIZED HEALTH CARE EDUCATION/TRAINING PROGRAM

## 2022-07-06 PROCEDURE — 82803 BLOOD GASES ANY COMBINATION: CPT | Performed by: SURGERY

## 2022-07-06 PROCEDURE — 258N000003 HC RX IP 258 OP 636: Performed by: STUDENT IN AN ORGANIZED HEALTH CARE EDUCATION/TRAINING PROGRAM

## 2022-07-06 PROCEDURE — 94640 AIRWAY INHALATION TREATMENT: CPT

## 2022-07-06 PROCEDURE — U0005 INFEC AGEN DETEC AMPLI PROBE: HCPCS | Performed by: STUDENT IN AN ORGANIZED HEALTH CARE EDUCATION/TRAINING PROGRAM

## 2022-07-06 PROCEDURE — 71045 X-RAY EXAM CHEST 1 VIEW: CPT

## 2022-07-06 PROCEDURE — 99233 SBSQ HOSP IP/OBS HIGH 50: CPT | Mod: 24 | Performed by: INTERNAL MEDICINE

## 2022-07-06 PROCEDURE — 99233 SBSQ HOSP IP/OBS HIGH 50: CPT | Mod: 24 | Performed by: CLINICAL NURSE SPECIALIST

## 2022-07-06 PROCEDURE — 94640 AIRWAY INHALATION TREATMENT: CPT | Mod: 76

## 2022-07-06 PROCEDURE — 71045 X-RAY EXAM CHEST 1 VIEW: CPT | Mod: 77

## 2022-07-06 PROCEDURE — 999N000157 HC STATISTIC RCP TIME EA 10 MIN

## 2022-07-06 PROCEDURE — 83735 ASSAY OF MAGNESIUM: CPT | Performed by: INTERNAL MEDICINE

## 2022-07-06 PROCEDURE — 999N000155 HC STATISTIC RAPCV CVP MONITORING

## 2022-07-06 PROCEDURE — 97530 THERAPEUTIC ACTIVITIES: CPT | Mod: GO

## 2022-07-06 PROCEDURE — 84075 ASSAY ALKALINE PHOSPHATASE: CPT | Performed by: STUDENT IN AN ORGANIZED HEALTH CARE EDUCATION/TRAINING PROGRAM

## 2022-07-06 PROCEDURE — 85520 HEPARIN ASSAY: CPT | Performed by: THORACIC SURGERY (CARDIOTHORACIC VASCULAR SURGERY)

## 2022-07-06 PROCEDURE — 84443 ASSAY THYROID STIM HORMONE: CPT | Performed by: STUDENT IN AN ORGANIZED HEALTH CARE EDUCATION/TRAINING PROGRAM

## 2022-07-06 PROCEDURE — 97535 SELF CARE MNGMENT TRAINING: CPT | Mod: GO

## 2022-07-06 RX ORDER — SULFAMETHOXAZOLE/TRIMETHOPRIM 400MG-80MG
1 TABLET ORAL
Status: DISCONTINUED | OUTPATIENT
Start: 2022-07-06 | End: 2022-07-06

## 2022-07-06 RX ORDER — CALCIUM GLUCONATE 20 MG/ML
1 INJECTION, SOLUTION INTRAVENOUS ONCE
Status: COMPLETED | OUTPATIENT
Start: 2022-07-06 | End: 2022-07-06

## 2022-07-06 RX ORDER — FLUDROCORTISONE ACETATE 0.1 MG/1
0.1 TABLET ORAL DAILY
Status: DISCONTINUED | OUTPATIENT
Start: 2022-07-06 | End: 2022-07-11

## 2022-07-06 RX ORDER — MAGNESIUM SULFATE HEPTAHYDRATE 40 MG/ML
2 INJECTION, SOLUTION INTRAVENOUS EVERY 8 HOURS PRN
Status: DISCONTINUED | OUTPATIENT
Start: 2022-07-06 | End: 2022-07-06

## 2022-07-06 RX ORDER — POTASSIUM CHLORIDE 29.8 MG/ML
20 INJECTION INTRAVENOUS EVERY 8 HOURS PRN
Status: DISCONTINUED | OUTPATIENT
Start: 2022-07-06 | End: 2022-07-06

## 2022-07-06 RX ORDER — SULFAMETHOXAZOLE AND TRIMETHOPRIM 400; 80 MG/1; MG/1
TABLET ORAL
Status: COMPLETED
Start: 2022-07-06 | End: 2022-07-06

## 2022-07-06 RX ORDER — CALCIUM CHLORIDE, MAGNESIUM CHLORIDE, DEXTROSE MONOHYDRATE, LACTIC ACID, SODIUM CHLORIDE, SODIUM BICARBONATE AND POTASSIUM CHLORIDE 5.15; 2.03; 22; 5.4; 6.46; 3.09; .157 G/L; G/L; G/L; G/L; G/L; G/L; G/L
12.5 INJECTION INTRAVENOUS CONTINUOUS
Status: DISCONTINUED | OUTPATIENT
Start: 2022-07-06 | End: 2022-07-06

## 2022-07-06 RX ORDER — CALCIUM GLUCONATE 20 MG/ML
2 INJECTION, SOLUTION INTRAVENOUS EVERY 8 HOURS PRN
Status: DISCONTINUED | OUTPATIENT
Start: 2022-07-06 | End: 2022-07-06

## 2022-07-06 RX ORDER — BUMETANIDE 0.25 MG/ML
5 INJECTION INTRAMUSCULAR; INTRAVENOUS ONCE
Status: COMPLETED | OUTPATIENT
Start: 2022-07-06 | End: 2022-07-06

## 2022-07-06 RX ORDER — SULFAMETHOXAZOLE AND TRIMETHOPRIM 400; 80 MG/1; MG/1
1 TABLET ORAL
Status: DISCONTINUED | OUTPATIENT
Start: 2022-07-06 | End: 2022-07-13

## 2022-07-06 RX ORDER — CALCIUM CHLORIDE, MAGNESIUM CHLORIDE, DEXTROSE MONOHYDRATE, LACTIC ACID, SODIUM CHLORIDE, SODIUM BICARBONATE AND POTASSIUM CHLORIDE 5.15; 2.03; 22; 5.4; 6.46; 3.09; .157 G/L; G/L; G/L; G/L; G/L; G/L; G/L
INJECTION INTRAVENOUS CONTINUOUS
Status: DISCONTINUED | OUTPATIENT
Start: 2022-07-06 | End: 2022-07-06

## 2022-07-06 RX ORDER — DEXTROSE MONOHYDRATE 25 G/50ML
25 INJECTION, SOLUTION INTRAVENOUS ONCE
Status: COMPLETED | OUTPATIENT
Start: 2022-07-06 | End: 2022-07-06

## 2022-07-06 RX ORDER — BUMETANIDE 0.25 MG/ML
6 INJECTION INTRAMUSCULAR; INTRAVENOUS ONCE
Status: COMPLETED | OUTPATIENT
Start: 2022-07-06 | End: 2022-07-06

## 2022-07-06 RX ADMIN — HEPARIN SODIUM 5000 UNITS: 5000 INJECTION, SOLUTION INTRAVENOUS; SUBCUTANEOUS at 13:26

## 2022-07-06 RX ADMIN — Medication 0.06 MCG/KG/MIN: at 00:13

## 2022-07-06 RX ADMIN — CALCIUM GLUCONATE 1 G: 20 INJECTION, SOLUTION INTRAVENOUS at 02:04

## 2022-07-06 RX ADMIN — CALCIUM CARBONATE 600 MG (1,500 MG)-VITAMIN D3 400 UNIT TABLET 1 TABLET: at 18:05

## 2022-07-06 RX ADMIN — OXYCODONE HYDROCHLORIDE 10 MG: 10 TABLET ORAL at 01:16

## 2022-07-06 RX ADMIN — FLUDROCORTISONE ACETATE 0.1 MG: 0.1 TABLET ORAL at 10:34

## 2022-07-06 RX ADMIN — NYSTATIN 1000000 UNITS: 100000 SUSPENSION ORAL at 15:47

## 2022-07-06 RX ADMIN — Medication 1 PACKET: at 08:33

## 2022-07-06 RX ADMIN — ASPIRIN 81 MG CHEWABLE TABLET 81 MG: 81 TABLET CHEWABLE at 08:32

## 2022-07-06 RX ADMIN — NYSTATIN 1000000 UNITS: 100000 SUSPENSION ORAL at 20:22

## 2022-07-06 RX ADMIN — HYDROCORTISONE SODIUM SUCCINATE 50 MG: 100 INJECTION, POWDER, FOR SOLUTION INTRAMUSCULAR; INTRAVENOUS at 09:50

## 2022-07-06 RX ADMIN — LEVALBUTEROL HYDROCHLORIDE 1.25 MG: 1.25 SOLUTION RESPIRATORY (INHALATION) at 16:51

## 2022-07-06 RX ADMIN — LEVALBUTEROL HYDROCHLORIDE 1.25 MG: 1.25 SOLUTION RESPIRATORY (INHALATION) at 19:52

## 2022-07-06 RX ADMIN — DEXTROSE MONOHYDRATE 25 G: 25 INJECTION, SOLUTION INTRAVENOUS at 02:58

## 2022-07-06 RX ADMIN — OXYCODONE HYDROCHLORIDE 10 MG: 10 TABLET ORAL at 18:11

## 2022-07-06 RX ADMIN — SODIUM ZIRCONIUM CYCLOSILICATE 10 G: 10 POWDER, FOR SUSPENSION ORAL at 13:25

## 2022-07-06 RX ADMIN — INSULIN ASPART 1 UNITS: 100 INJECTION, SOLUTION INTRAVENOUS; SUBCUTANEOUS at 12:21

## 2022-07-06 RX ADMIN — METHOCARBAMOL 500 MG: 500 TABLET ORAL at 12:22

## 2022-07-06 RX ADMIN — SODIUM ZIRCONIUM CYCLOSILICATE 10 G: 10 POWDER, FOR SUSPENSION ORAL at 22:06

## 2022-07-06 RX ADMIN — METHOCARBAMOL 500 MG: 500 TABLET ORAL at 08:32

## 2022-07-06 RX ADMIN — MYCOPHENOLATE MOFETIL 1500 MG: 200 POWDER, FOR SUSPENSION ORAL at 08:33

## 2022-07-06 RX ADMIN — HUMAN INSULIN 6.57 UNITS: 100 INJECTION, SOLUTION SUBCUTANEOUS at 02:58

## 2022-07-06 RX ADMIN — Medication 40 MG: at 08:31

## 2022-07-06 RX ADMIN — ACETYLCYSTEINE 2 ML: 200 SOLUTION ORAL; RESPIRATORY (INHALATION) at 19:52

## 2022-07-06 RX ADMIN — HYDROCORTISONE SODIUM SUCCINATE 50 MG: 100 INJECTION, POWDER, FOR SOLUTION INTRAMUSCULAR; INTRAVENOUS at 15:39

## 2022-07-06 RX ADMIN — METHOCARBAMOL 500 MG: 500 TABLET ORAL at 20:21

## 2022-07-06 RX ADMIN — MIDODRINE HYDROCHLORIDE 20 MG: 5 TABLET ORAL at 15:47

## 2022-07-06 RX ADMIN — VASOPRESSIN 1 UNITS/HR: 20 INJECTION INTRAVENOUS at 12:00

## 2022-07-06 RX ADMIN — ONDANSETRON 4 MG: 2 INJECTION INTRAMUSCULAR; INTRAVENOUS at 09:01

## 2022-07-06 RX ADMIN — HYDROCORTISONE SODIUM SUCCINATE 50 MG: 100 INJECTION, POWDER, FOR SOLUTION INTRAMUSCULAR; INTRAVENOUS at 22:02

## 2022-07-06 RX ADMIN — CEFTAZIDIME 2 G: 2 INJECTION, POWDER, FOR SOLUTION INTRAVENOUS at 11:20

## 2022-07-06 RX ADMIN — ACETAMINOPHEN 975 MG: 325 TABLET, FILM COATED ORAL at 20:21

## 2022-07-06 RX ADMIN — BUMETANIDE 5 MG: 0.25 INJECTION INTRAMUSCULAR; INTRAVENOUS at 02:00

## 2022-07-06 RX ADMIN — BUMETANIDE 5 MG: 0.25 INJECTION INTRAMUSCULAR; INTRAVENOUS at 09:50

## 2022-07-06 RX ADMIN — HYDROMORPHONE HYDROCHLORIDE 0.2 MG: 0.2 INJECTION, SOLUTION INTRAMUSCULAR; INTRAVENOUS; SUBCUTANEOUS at 22:35

## 2022-07-06 RX ADMIN — CALCIUM CARBONATE 600 MG (1,500 MG)-VITAMIN D3 400 UNIT TABLET 1 TABLET: at 08:32

## 2022-07-06 RX ADMIN — SODIUM ZIRCONIUM CYCLOSILICATE 10 G: 10 POWDER, FOR SUSPENSION ORAL at 08:34

## 2022-07-06 RX ADMIN — NYSTATIN 1000000 UNITS: 100000 SUSPENSION ORAL at 08:31

## 2022-07-06 RX ADMIN — INSULIN ASPART 1 UNITS: 100 INJECTION, SOLUTION INTRAVENOUS; SUBCUTANEOUS at 00:17

## 2022-07-06 RX ADMIN — BUMETANIDE 6 MG: 0.25 INJECTION INTRAMUSCULAR; INTRAVENOUS at 13:43

## 2022-07-06 RX ADMIN — SODIUM CHLORIDE 500 ML: 9 INJECTION, SOLUTION INTRAVENOUS at 01:58

## 2022-07-06 RX ADMIN — LEVALBUTEROL HYDROCHLORIDE 1.25 MG: 1.25 SOLUTION RESPIRATORY (INHALATION) at 09:09

## 2022-07-06 RX ADMIN — METHOCARBAMOL 500 MG: 500 TABLET ORAL at 15:47

## 2022-07-06 RX ADMIN — DEXTROSE MONOHYDRATE 300 ML: 100 INJECTION, SOLUTION INTRAVENOUS at 02:59

## 2022-07-06 RX ADMIN — HEPARIN SODIUM 5000 UNITS: 5000 INJECTION, SOLUTION INTRAVENOUS; SUBCUTANEOUS at 22:04

## 2022-07-06 RX ADMIN — PREDNISOLONE 17.5 MG: 15 SOLUTION ORAL at 08:34

## 2022-07-06 RX ADMIN — PROCHLORPERAZINE EDISYLATE 10 MG: 5 INJECTION INTRAMUSCULAR; INTRAVENOUS at 10:06

## 2022-07-06 RX ADMIN — OXYCODONE HYDROCHLORIDE 10 MG: 10 TABLET ORAL at 06:03

## 2022-07-06 RX ADMIN — ACETAMINOPHEN 975 MG: 325 TABLET, FILM COATED ORAL at 13:26

## 2022-07-06 RX ADMIN — TACROLIMUS 6 MG: 5 CAPSULE ORAL at 18:05

## 2022-07-06 RX ADMIN — ACETYLCYSTEINE 2 ML: 200 SOLUTION ORAL; RESPIRATORY (INHALATION) at 16:51

## 2022-07-06 RX ADMIN — INSULIN ASPART 4 UNITS: 100 INJECTION, SOLUTION INTRAVENOUS; SUBCUTANEOUS at 16:00

## 2022-07-06 RX ADMIN — MIDODRINE HYDROCHLORIDE 20 MG: 5 TABLET ORAL at 08:32

## 2022-07-06 RX ADMIN — SULFAMETHOXAZOLE AND TRIMETHOPRIM 1 TABLET: 400; 80 TABLET ORAL at 20:22

## 2022-07-06 RX ADMIN — ACETYLCYSTEINE 2 ML: 200 SOLUTION ORAL; RESPIRATORY (INHALATION) at 09:09

## 2022-07-06 RX ADMIN — MYCOPHENOLATE MOFETIL 1500 MG: 200 POWDER, FOR SUSPENSION ORAL at 20:21

## 2022-07-06 RX ADMIN — HEPARIN SODIUM 5000 UNITS: 5000 INJECTION, SOLUTION INTRAVENOUS; SUBCUTANEOUS at 06:03

## 2022-07-06 RX ADMIN — ACETYLCYSTEINE 2 ML: 200 SOLUTION ORAL; RESPIRATORY (INHALATION) at 12:33

## 2022-07-06 RX ADMIN — TACROLIMUS 4 MG: 5 CAPSULE ORAL at 08:34

## 2022-07-06 RX ADMIN — LEVALBUTEROL HYDROCHLORIDE 1.25 MG: 1.25 SOLUTION RESPIRATORY (INHALATION) at 12:33

## 2022-07-06 RX ADMIN — ACETAMINOPHEN 975 MG: 325 TABLET, FILM COATED ORAL at 06:03

## 2022-07-06 RX ADMIN — LIDOCAINE PATCH 4% 2 PATCH: 40 PATCH TOPICAL at 08:33

## 2022-07-06 RX ADMIN — INSULIN ASPART 4 UNITS: 100 INJECTION, SOLUTION INTRAVENOUS; SUBCUTANEOUS at 09:07

## 2022-07-06 RX ADMIN — MIDODRINE HYDROCHLORIDE 20 MG: 5 TABLET ORAL at 00:17

## 2022-07-06 RX ADMIN — INSULIN ASPART 2 UNITS: 100 INJECTION, SOLUTION INTRAVENOUS; SUBCUTANEOUS at 20:21

## 2022-07-06 RX ADMIN — NYSTATIN 1000000 UNITS: 100000 SUSPENSION ORAL at 11:31

## 2022-07-06 RX ADMIN — MULTIVITAMIN 15 ML: LIQUID ORAL at 08:32

## 2022-07-06 ASSESSMENT — ACTIVITIES OF DAILY LIVING (ADL)
ADLS_ACUITY_SCORE: 34
ADLS_ACUITY_SCORE: 34
ADLS_ACUITY_SCORE: 32
ADLS_ACUITY_SCORE: 34
ADLS_ACUITY_SCORE: 34
ADLS_ACUITY_SCORE: 32

## 2022-07-06 NOTE — PLAN OF CARE
Goal Outcome Evaluation:    Plan of Care Reviewed With: patient     Overall Patient Progress: improving    Outcome Evaluation: pt meeting nutrition needs via TF

## 2022-07-06 NOTE — PROGRESS NOTES
CV ICU PROGRESS NOTE  07/06/2022        CO-MORBIDITIES:   HTN, HFpEF, COPD, HCV    ASSESSMENT: Sofie Rodriguez is a 62 year old female with PMH of oxygen-dependent COPD, HFpEF, HTN, HCV, and osteopenia who underwent bilateral lung transplant on 6/28/22 with Dr. Sunshine. This was complicated by left upper extremity acute limb ischemia s/p left radial thrombectomy on 7/1/22. She remains critically ill requiring ventilatory and hemodynamic support.      PLAN SUMMARY:  - Immunosuppression and prophylaxis per pulmonary transplant team  - Trend ABG's  - Wean off BiPAP (ongoing respiratory acidosis) as able; HFNC if able  - Bumex 5 x 1; follow-up with nephrology re: recs; BACILIO seems to be improving with increased UOP and starting to respond to diuretics  - Wean off pressors as able; continues to require NE  - Hydrocortisone stress dose + fludrocortisone given concern for adrenal insufficiency and subsequent vasoplegia  - Transduce CVP and add FloTrac monitoring for goal-directed hemodynamic monitoring  - Order TSH + reflex free T4  - Ceftazidime (2-week course)    PLAN:  Neuro/ pain/ sedation:  Acute postoperative pain  - Monitor neurological status. Notify the MD for any acute changes in exam.  - Scheduled: acetaminophen, robaxin. PRN: oxycodone, dilaudid     Pulmonary:  #Postoperative ventilatory support  #Bilateral Lung Transplant  #Hx COPD  - Titrate FiO2 for SpO2 >92%  - Pulmonary hygiene: Incentive spirometer every 15- 30 minutes when awake  - Basiliximab POD #0 and #4, prednisolone, mycophenolate, tacrolimus  - Valganciclovir       Cardiovascular:  #Hx HTN  #Hx HFpEF  - Monitor hemodynamic status.   - Goal MAP >65  - Norepinephrine drip, wean as tolerated     GI care/ Nutrition:   - NJ TF @ goal  - PPI: protonix  - Continue bowel regimen: miralax; PRN moM    Renal/ Fluid Balance/ Electrolytes:   #BACILIO  - Strict I/O, daily weights  - Avoid/limit nephrotoxins as able  - Replete lytes PRN per protocol     Endocrine:     Stress induced hyperglycemia  - High sliding scale insulin 7/2  - Lantus 35 units daily     ID/ Antibiotics:  Stress-induced leukocytosis  - Continue to monitor fever curve, WBC and inflammatory markers as appropriate  - Prophylaxis: POD #8-90 nystatin, TMP/SMX (discontined due to BACILIO), valganciclovir  - Post-op abx: ceftazadime extended to 14 day coverage given elevated temp and WBCs  - Post-op cultures:   - Gram stain (1+ G+ cocci)   - Resp aerobic cx (4+ G- bacilli)   - Fungal cx (NGTD)   - AFB cx (NGTD)  - Blood cx sent 6/30     Heme:     Acute blood loss anemia  Acute blood loss thrombocytopenia  Left upper extremity acute limb ischemia s/p left radial thrombectomy on 7/1/22  - Transitioned from heparin gtt to SQH per vascular surgery  - Daily CBC        Prophylaxis:    - DVT: mechanical +SQH  - PPI   - PT/OT     Lines/ tubes/ drains:  - CTs x5, RIJ, montgomery, PIV x2, a-line  - 4 Pleural, 1 med     Disposition:  - CVICU    Patient seen, findings and plan discussed with CV ICU staff, Dr. Bolden.    Danuta Chairez MD  Anesthesiology Resident, PGY-4        ====================================    SUBJECTIVE:   naeo    OBJECTIVE:   1. VITAL SIGNS:   Temp:  [97.7  F (36.5  C)-98.8  F (37.1  C)] 98.3  F (36.8  C)  Pulse:  [67-99] 84  Resp:  [12-46] 28  BP: ()/(49-57) 115/54  MAP:  [22 mmHg-89 mmHg] 87 mmHg  Arterial Line BP: ()/(22-63) 144/54  FiO2 (%):  [25 %-35 %] 25 %  SpO2:  [85 %-100 %] 100 %  FiO2 (%): 25 %  Resp: 28      2. INTAKE/ OUTPUT:   I/O last 3 completed shifts:  In: 3339.27 [P.O.:120; I.V.:1169.27; Other:300; NG/GT:800]  Out: 2519 [Urine:1444; Chest Tube:1075]    3. PHYSICAL EXAMINATION:   General: lethargic; follows commands  Neuro: grossly intact  Resp: on BiPAP  CV: RRR  Abdomen: Soft, Non-distended, Non-tender  Incisions: c/d/i  Extremities: warm and well perfused    4. INVESTIGATIONS:   Arterial Blood Gases   Recent Labs   Lab 07/06/22  0337 07/05/22  2021 07/05/22  1435  07/02/22  0241   PH 7.28* 7.29* 7.27* 7.19*   PCO2 65* 67* 62* 77*   PO2 150* 93 140* 93   HCO3 31* 32* 29* 29*     Complete Blood Count   Recent Labs   Lab 07/06/22  0337 07/05/22  0807 07/05/22  0345 07/04/22  0323   WBC 9.2 9.5 11.4* 12.2*   HGB 7.4* 7.9* 8.2* 8.6*    190 203 173     Basic Metabolic Panel  Recent Labs   Lab 07/06/22  0807 07/06/22  0806 07/06/22  0557 07/06/22  0554 07/06/22  0501 07/06/22  0405 07/06/22 0337 07/06/22  0016 07/06/22  0012 07/05/22 2024 07/05/22 2020   NA  --  132*  --   --   --   --  132*  --  133*  --  135*   POTASSIUM  --  5.2  --  5.4*  --   --  5.2  5.5*  --  6.0*  --  5.7*   CHLORIDE  --  97*  --   --   --   --  98  --  98  --  100   CO2  --  30*  --   --   --   --  28  --  32*  --  32*   BUN  --  84.6*  --   --   --   --  82.7*  --  85.5*  --  84.6*   CR  --  1.97*  --   --   --   --  1.98*  --  2.16*  --  2.17*   * 218* 244*  --  204*   < > 244*   < > 169*   < > 143*    < > = values in this interval not displayed.     Liver Function Tests  Recent Labs   Lab 07/06/22 0337 07/04/22 0323 06/30/22  0355 06/29/22  2341   AST 24 20 65* 78*   ALT 22 24 23 27   ALKPHOS 59 60 35 40   BILITOTAL <0.2 <0.2 0.3 0.3   ALBUMIN  --  2.6* 2.6* 2.8*     Pancreatic Enzymes  No lab results found in last 7 days.  Coagulation Profile  No lab results found in last 7 days.      5. RADIOLOGY:   Recent Results (from the past 24 hour(s))   XR Chest Port 1 View    Narrative    EXAM: XR CHEST PORT 1 VIEW  7/6/2022 12:44 AM     HISTORY:  Post-Op Lung       COMPARISON:  7/5/2022    FINDINGS  Technique: Semiupright AP view of the chest.     Devices: Right internal jugular approach central venous catheter  terminates over the mid SVC. Bilateral apical and basal chest tubes  are not appreciably changed. Clamshell sternotomy wires.    Unchanged streaky perihilar and bibasilar pulmonary opacities. Cardiac  silhouette is stable in size. Aortic arch calcifications. Remaining  trace right  apical pneumothorax. No pleural effusion.       Impression    IMPRESSION:     1. Trace remaining right apical pneumothorax with bilateral chest  tubes in place.  2. Support devices stable.  3. Unchanged bilateral perihilar/basilar atelectasis/edema.    I have personally reviewed the examination and initial interpretation  and I agree with the findings.    GABRIELLA SNELL MD         SYSTEM ID:  [id number]       =========================================

## 2022-07-06 NOTE — PROGRESS NOTES
Nephrology Progress Note  07/06/2022       Sofie Rodriguez is a 62 yom with hx of HTN, Hep C, osetopenia, previous polysubstance use (stopped 2019) and severe COPD who is s/p BSLT on 6/28/2022.  Had ~2.5h of bypass time, was uncomplicated but now has post op BACILIO in setting of continued need for vasopressors thought to be due to vasoplegia.  Had radial artery thrombus requiring thrombectomy on 7/2.  Nephrology consulted for BACILIO with mild hyperkalemia and oliguric UOP.      Interval History :   Mrs Higgins's Cr is slightly improved this am and UOP is increasing likely indicative of impending recovery, Cr will likely lag behind actual gfr.  Remains vasopressor dependent thought to be vasoplegia related, adjusting doses to keep SBP >100, MAP >65.  K up overnight but is coming down with diuresis and with change of TF to renal formula.  UOP already 1.2L today with bumex, goal for volume is net even.        Assessment & Recommendations:   BACILIO-Normal baseline renal fx historically and at time of surgery.  No renal imaging but low suspicion of obstruction so will consider if she does not show improvement.  BACILIO is likely hypoperfusion with ~2.5h of bypass time and tacrolimus although levels have been within range.  Urine Na is low although this is difficult to interpret with tacrolimus as it can cause renal vasoconstriction.  Will manage this medically for now and continue to monitor.  UA initially showed hyaline casts on 6/28, now with some protein which is likely tubular range and relatively concentrated SG at 1.030 likely related to continued borderline BP's.  Noted that she did not get contrast for thrombectomy on 7/2.  Cr appearing to plateau, K remains an issue but slowly improving with switch to renal TF and increasing UOP.                -BACILIO, likely hypoperfusion and need for tacro.                  -Continue to avoid nephrotoxins, norepi being weaned but would keep SBP >100, hypotensive due to vasoplegia.                "   -Cr slightly improved, UOP increasing with high dose diuretics.  No indication for RRT.       Volume-Edematous and net positive 9L since surgery, net positive ~1L despite diuretics yesterday.  Increased dose of diuretics today and so far is net even which is reasonable target volume goal for today.       Electrolytes-K 5.2, bicarb 30 which likely is some appropriate compensation for hypercapnia.        BMD-Ca up at 8.0, iCal WNL throughout, will follow.  No issues with Mg/Phos.       Lung Tx-On Tacro, levels have been within range, last level 7.7.  Has hypercapnia despite transplant and reasonable CXR/oxygenation, team expects this to improve with time.       Anemia-Hgb 7.4, acute management per team.       Nutrition-Started on TF, changed to renal TF     Time spent: 30 minutes on this date of encounter for chart review, physical exam, medical decision making and co-ordination of care.      Seen and discussed with Dr Arboleda     Recommendations were communicated to primary team via verbal communication.      Madan Leblanc, APRN CNS  Clinical Nurse Specialist  [phone]    Review of Systems:   I reviewed the following systems:  Gen: No fevers or chills  CV: No CP at rest  Resp: No SOB at rest  GI: No N/V    Physical Exam:   I/O last 3 completed shifts:  In: 3339.27 [P.O.:120; I.V.:1169.27; Other:300; NG/GT:800]  Out: 2519 [Urine:1444; Chest Tube:1075]   /54 (BP Location: Right arm)   Pulse 93   Temp 97.7  F (36.5  C) (Axillary)   Resp (!) 31   Ht 1.575 m (5' 2\")   Wt 72.9 kg (160 lb 11.2 oz)   SpO2 96%   BMI 29.39 kg/m       GENERAL APPEARANCE: On Bipap, in mild distress.    EYES: No scleral icterus  NECK:  No JVD  Pulmonary: lungs clear to auscultation with equal breath sounds bilaterally, no clubbing or cyanosis  CV: Regular rhythm, normal rate, no rub   - Edema+2 generalized.   GI: soft, nontender, normal bowel sounds  MS: no evidence of inflammation in joints, no muscle tenderness  : + " Dong  SKIN: no rash, warm, dry  NEURO: Answering questions appropriately, no focal deficits.      Labs:   All labs reviewed by me  Electrolytes/Renal - Recent Labs   Lab Test 07/06/22  0807 07/06/22  0806 07/06/22  0557 07/06/22  0554 07/06/22  0405 07/06/22  0337 07/06/22  0016 07/06/22  0012 07/05/22  1214 07/05/22  1207 07/05/22  0348 07/05/22  0345   NA  --  132*  --   --   --  132*  --  133*   < > 134*  --  136   POTASSIUM  --  5.2  --  5.4*  --  5.2  5.5*  --  6.0*   < > 5.5*   < > 5.8*   CHLORIDE  --  97*  --   --   --  98  --  98   < > 99  --  101   CO2  --  30*  --   --   --  28  --  32*   < > 30*  --  31*   BUN  --  84.6*  --   --   --  82.7*  --  85.5*   < > 75.4*  --  73.6*   CR  --  1.97*  --   --   --  1.98*  --  2.16*   < > 2.09*  --  2.09*   * 218* 244*  --    < > 244*   < > 169*   < > 203*   < > 211*   ESTUARDO  --  8.0*  --   --   --  8.0*  --  8.1*   < > 11.9*  --  7.7*   MAG  --   --   --   --   --  2.9*  --   --   --  3.0*  --  3.2*   PHOS  --   --   --   --   --  3.5  --   --   --  3.3  --  3.6    < > = values in this interval not displayed.       CBC -   Recent Labs   Lab Test 07/06/22 0337 07/05/22 0807 07/05/22 0345   WBC 9.2 9.5 11.4*   HGB 7.4* 7.9* 8.2*    190 203       LFTs -   Recent Labs   Lab Test 07/06/22 0337 07/04/22  0323 06/30/22  0355 06/29/22  2341   ALKPHOS 59 60 35 40   BILITOTAL <0.2 <0.2 0.3 0.3   ALT 22 24 23 27   AST 24 20 65* 78*   PROTTOTAL 4.4* 4.4* 4.0* 4.1*   ALBUMIN  --  2.6* 2.6* 2.8*       Iron Panel - No lab results found.        Current Medications:    acetaminophen  975 mg Oral Q8H     acetylcysteine  2 mL Nebulization 4x Daily     aspirin  81 mg Oral Daily     bumetanide  6 mg Intravenous Once     calcium carbonate 600 mg-vitamin D 400 units  1 tablet Oral BID w/meals     cefTAZidime  2 g Intravenous Q24H     fludrocortisone  0.1 mg Oral Daily     heparin ANTICOAGULANT  5,000 Units Subcutaneous Q8H [facility]     hydrocortisone sodium succinate PF  50  mg Intravenous Q6H     insulin aspart  1-12 Units Subcutaneous Q4H     insulin glargine  35 Units Subcutaneous QAM AC     levalbuterol  1.25 mg Nebulization 4x Daily     lidocaine  2 patch Transdermal Q24H     lidocaine   Transdermal Q8H TONY     methocarbamol  500 mg Oral 4x Daily     midodrine  20 mg Oral Q8H     multivitamins w/minerals  15 mL Per Feeding Tube Daily     mycophenolate  1,500 mg Oral BID    Or     mycophenolate  1,500 mg Oral or NG Tube BID     nystatin  1,000,000 Units Swish & Swallow 4x Daily     pantoprazole  40 mg Oral or Feeding Tube Daily     polyethylene glycol  17 g Oral BID     protein modular  1 packet Per Feeding Tube Daily     senna-docusate  2 tablet Oral BID     sodium chloride (PF)  3 mL Intracatheter Q8H     sodium zirconium cyclosilicate  10 g Oral TID     tacrolimus  4 mg Oral BID IS     [START ON 7/7/2022] valGANciclovir  450 mg Oral or NG Tube Once per day on Mon Thu       dextrose       heparin for  units in  mL (1 unit/mL for Interventional Radiology) 3 Units/hr (07/05/22 1326)     norepinephrine 0.16 mcg/kg/min (07/06/22 1140)     vasopressin 1 Units/hr (07/06/22 1200)

## 2022-07-06 NOTE — PROGRESS NOTES
Metabolic cart study stopped after 6 minutes (10 min ideal) secondary to desaturation to 78%. Values charted and procedure completed. Please order again if desired.    Blanca Whitmore, RT

## 2022-07-06 NOTE — PROGRESS NOTES
Call by the primary team potassium is 6 after lokelma and lasix 40 mg and 80 mg.I ask them to give IV bumex 5 mg shift potassium, calcuium gluconate and  ml bolus and if urine out put does not  in 2 hour will start CRRT. Also got consent for CRRT

## 2022-07-06 NOTE — PROGRESS NOTES
ICU End of Shift Summary. See flowsheets for vital signs and detailed assessment.    Changes this shift:   Patient transferred over from  this afternoon. Upon arrival, patient was vitally stable on two pressors keeping MAP >65. Patient was placed back on bipap after it was taken off for transport. Sating low 90's on 25% FiO2 on bipap 14/5, now dropping into the upper 80's on the same settings. Increasing FiO2 to 30%. FiO2 now at 30% and patient is sating 93. Patient is rousable and able to follow commands and answer questions. When not being bothered, her eyes are closed and is quite lethargic. Diuresed with bumex again this evening and so far has put out 700ml.     Plan:   Continue to monitor and assess. Follow treatment plan.

## 2022-07-06 NOTE — PROGRESS NOTES
Lung Transplant Consult Follow Up Note   July 6, 2022            Assessment and Plan:   Sofie Rodriguez is a 62 year old female with a PMH significant for end stage COPD, HTN, Hep C, and osteopenia as well as former methamphetamine use.  Pt. 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  Surgery on CPB, uncomplicated, lungs slightly undersized for chest. Did require some moderate volume resuscitation with low dose pressors post transplant. Extubated 6/30. Persistent CO2 retention, with limited improvement with BiPAP. Etiology is unclear but appears to be a resp drive problem. Persistent low dose pressor requirement of unclear etiology.  Left radial artery thrombus (presumed secondary to arterial line) with thrombectomy under local anesthesia and MAC on 7/2.     Recommendations today:   - Continue Bipap for CO2 retention/acidosis at night and as needed during the day depending on VBG results  - Monitor VBGs  - Fludrocortisone for hyperkalemia and BP support  - Trial of hydrocortisone for persistent hypotension.  - Diuresis per renal recommendations  - Sputum airway cultures with stenotrophomonas and Strep pneumo. Continue ceftaz.  - Tacro level low 4.8.  Increase to 6mg BID and recheck Fri and Sat. (ordered)  - Prednisolone held while on stress dose hydrocortisone.  Reinitiate prednisolone when hydrocortisone is less than 120 mg in 24 hours.  - begin valcyte prophylaxis 7/7 (ordered)  - Begin bactrim prophylaxis 7/7 but will dose 3X/wk due to elevated creatinine.  - Consider RQ study to assess source of elevated CO2  - Consider evaluation of diaphragm function, bedside ultrasound or sniff test     S/p bilateral sequential lung transplant (BSLT) for end stage COPD:  Acute on chronic hypoxic respiratory failure: No evidence of PGD post operatively, Extubated to 2L NC on 6/30.  Ongoing low dose pressor requirement.  Persistent CO2 retention but the patient is not very tachypneic/dyspneic so it appears to be a drive issue.   - 7/6 chest  x-ray, reviewed by me, bilateral mildly  increased interstitial markings, unchanged from yesterday.  - ABG 7.28/65/150/71  - CO2 retention but oxygenating well and no evidence of resp distress. Likely reflects preop/chronic CO2 retention. May take some time to re-equilibrate. Recommend BiPAP at night and through the day as needed depending on VBG.  Minimize sedation. Reduce supplemental O2 to keep SaO2 93-96%. Monitor VBGs  -Consider RQ study to assess source of CO2.  -Consider diaphragm assessment, either bedside ultrasound or sniff test to rule out role of diaphragm dysfunction.  - Nebs: levalbuterol and Mucomyst QID   - Aggressive pulmonary toilet with chest physiotherapy QID (addition of Aerobika and incentive spirometry once extubated)  - DSA on 7/5 (pending) then one month post-transplant  - Ammonia monitoring every twice weekly  x 3 weeks (screening for hyperammonemia post-lung transplant). Ammonia 16 (7/4)  - Bronch 6/29 with patent airways, no significant ischemic reperfusion injury, no significant edema, occasional mucous plugs in lower lobes bilaterally but removed upon therapeutic suctioning.   - PVTS catheters placed 6/29 but removed on 7/2 due to the requirement for heparinization for radial artery thrombectomy.  - Chest tubes managed by surgical team  - Daily CXR  - Post-pyloric feeding tube placed by GI  - Explant pathology with end stage emphysema as expected and all lymph nodes benign.      Immunosuppression:  Induction therapy with basiliximab (and high dose IV steroid) given intraoperatively, repeating basiliximab dose on POD#4.  - Tacrolimus goal tacrolimus level 8-12.   Date Tacro Level Intervention   6/29 <1.0 Continue current dose, 1 mg bid   6/30 3.0 Increase to 2 mg bid   7/1 6.9 No adjustment    7/2 9.1  continue current dose    7/3  8.6  switch to tacrolimus by feeding tube 4 mg twice daily starting tomorrow (7/4) morning   7/4 10.2 Switched to enteral today. Not steady state. Continue  current dose.   7/5  7.7 Not steady state, continue current dose.   7/6 4.8   increased to 6 mg twice daily.                       - MMF 1500 mg BID  - Methylprednisolone 125 mg x 3 doses completed.  Prednisone held while on stress dose hydrocortisone.  Reinitiate prednisolone when hydrocortisone is less than 120 mg in 24 hours.  Date AM dose (mg) PM dose (mg)   6/30 17.5 17.5   7/7 15 15   7/14 15 12.5   7/21 12.5 12.5   8/4 12.5 10   8/18 10 10   9/15 10 7.5   10/13 7.5 7.5   11/10 7.5 5   12/8 5 5   1/5/23 5 2.5      Prophylaxis:   - Bactrim for PJP ppx, begin 7/7, every other day for elevated creatinine  - VGCV for CMV ppx (as below to start on 7/7)  - Nystatin for oral candidiasis ppx, 6 month course  - See below for serologies and viral ppx:    Donor Recipient Intervention   CMV status + + Valganciclovir POD #8-90   EBV status + + EBV check monthly   HSV status N/A + No Acyclovir prophylaxis      Primary graft dysfunction (per [facility] guidelines):  See chart below:    POD #0  (~0 hours) POD #1  (~24 hours)   Date 6/29/22 6/30/22   Time 0535 0356   Intubated Y Y   PaO2 163 184   FiO2 40% 40   P/F Ratio 408 460   PGD Grade   (0=mild, 3=severe) 0 0   ECMO N N   Inhaled NO/Flolan N N         Hemodynamics: Persistent hypotension/pressor requirements.  Etiology unclear. - Echocardiogram with normal ejection fraction.  IVC not visualized.   Begin fludrocortisone for hypotension and hyperkalemia  Trial of stress dose steroids.  If persistent hypotension, consider brain MRI.           BACILIO: Creatinine rising, likely multifactorial including bactrim and hypotension. Unlikely that tacro has been playing a significant role since levels have generally been low.  Volume status is unclear.  Hypotension and rising creatinine suggests she is volume depleted but intake has generally been greater than output by greater than a liter each day and the patient does have some dependent edema.  -Diuresis per nephrology  "recommendations.  -Fludrocortisone for hyperkalemia.     Left hand ischemia:  Radial artery thrombosis identified on duplex Doppler.  Thrombectomy under local anesthesia and MAC successful on 7/2. Completed high intensity heparin.  Continue daily aspirin.        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   - AFB to be sent on all future bronchs, last on 6/29  - IgG at one month 7/29 6/30 blood culture negative to date  6/29 respiratory cultures negative to date  6/28 respiratory culture with Stenotrophomonas maltophilia and Streptococcus pneumoniae        Stenotrophomonas Maltophilia: Noted in right explanted lung washings.   -  Sensitivities reviewed. Stopped treatment bactrim. Continue ceftazidime X 14d     H/O Hep C: C: Diagnosed in 1980s, 2 mos of treatment, quant negative since 10/2017, last positive 2/20/17 ([phone]).Glenis positive on 6/2021 with negative HCV PCR. Hx of remote ETOH abuse. MR Elastography 4/27/21 with hepatology review and consult without any concerns post transplant.   6/29 HIV RNA negative  Hepatitis B DNA negative  Hepatitis C RNA negative  Hepatitis C antibody positive (old)        History of steroid induced hyperglycemia: Well controlled in outpatient. Management by primary team currently on insulin gtt, may need endo consult prior to discharge.      We appreciate the excellent care provided by the CVTS and CVICU teams.  Recommendations communicated via in person rounding and this note.  Will continue to follow along closely, please do not hesitate to call with any questions or concerns.        Ajay Castillo MD  [phone]             Interval History:     Mild dyspnea at rest, increased with activity.  Breathing is uncomfortable with BiPAP, \"too much air\".  Minimal cough.  No sputum.  Incisional pain is well controlled.           Review of Systems:   C: NEGATIVE for fever, chills  INTEGUMENTARY/SKIN: no " rash or obvious new lesions  ENT/MOUTH: sore throat resolved, new sinus pain or nasal drainage  RESP: see interval history  CV: NEGATIVE for chest pain, palpitations or peripheral edema  GI: no nausea, vomiting, change in stools  : no dysuria  MUSCULOSKELETAL: no myalgias, arthralgias            Medications:       acetaminophen  975 mg Oral Q8H     acetylcysteine  2 mL Nebulization 4x Daily     aspirin  81 mg Oral Daily     calcium carbonate 600 mg-vitamin D 400 units  1 tablet Oral BID w/meals     cefTAZidime  2 g Intravenous Q24H     heparin ANTICOAGULANT  5,000 Units Subcutaneous Q8H TONY     insulin aspart  1-12 Units Subcutaneous Q4H     insulin glargine  35 Units Subcutaneous QAM AC     levalbuterol  1.25 mg Nebulization 4x Daily     lidocaine  2 patch Transdermal Q24H     lidocaine   Transdermal Q8H TONY     methocarbamol  500 mg Oral 4x Daily     midodrine  20 mg Oral Q8H     multivitamins w/minerals  15 mL Per Feeding Tube Daily     mycophenolate  1,500 mg Oral BID    Or     mycophenolate  1,500 mg Oral or NG Tube BID     nystatin  1,000,000 Units Swish & Swallow 4x Daily     pantoprazole  40 mg Oral or Feeding Tube Daily     polyethylene glycol  17 g Oral BID     prednisoLONE  17.5 mg Oral or NG Tube BID     protein modular  1 packet Per Feeding Tube Daily     senna-docusate  2 tablet Oral BID     sodium chloride (PF)  3 mL Intracatheter Q8H     sodium zirconium cyclosilicate  10 g Oral TID     tacrolimus  4 mg Oral BID IS     [START ON 7/7/2022] valGANciclovir  450 mg Oral or NG Tube Once per day on Mon Thu     acetaminophen, bisacodyl, dextrose, glucose **OR** dextrose **OR** glucagon, HYDROmorphone **OR** HYDROmorphone, hydrOXYzine **OR** hydrOXYzine, lactated ringers, lidocaine 4%, lidocaine (buffered or not buffered), magnesium hydroxide, metoprolol, naloxone **OR** naloxone **OR** naloxone **OR** naloxone, ondansetron **OR** ondansetron, oxyCODONE **OR** oxyCODONE, prochlorperazine **OR**  prochlorperazine, sodium chloride (PF)         Physical Exam:   Temp:  [97.7  F (36.5  C)-98.8  F (37.1  C)] 98.6  F (37  C)  Pulse:  [67-99] 84  Resp:  [12-46] 27  BP: ()/(47-57) 115/54  MAP:  [22 mmHg-89 mmHg] 83 mmHg  Arterial Line BP: ()/(22-63) 142/52  FiO2 (%):  [25 %-35 %] 25 %  SpO2:  [85 %-100 %] 98 %    Intake/Output Summary (Last 24 hours) at 7/6/2022 0813  Last data filed at 7/6/2022 0800  Gross per 24 hour   Intake 2754.64 ml   Output 2359 ml   Net 395.64 ml     Constitutional:   Awake, alert and in no apparent distress     Eyes:   Nonicteric, PERRL     ENT:    oral mucosa moist without lesions     Neck:   Supple without supraclavicular or cervical lymphadenopathy, RIJ line     Lungs:   Diminished air flow, sharon in the bases.  No crackles. No rhonchi.  No wheezes.     Cardiovascular:   Normal S1 and S2.  RRR.  II/VI sys murmur. No gallop. No rub.     Abdomen:   NABS, soft, nondistended, nontender.  No HSM.     Musculoskeletal:   2+ edema     Neurologic:   Alert and conversant.     Skin:   Warm, dry.  No rash on limited exam.             Data:   All laboratory and imaging data reviewed.    Results for orders placed or performed during the hospital encounter of 06/28/22 (from the past 24 hour(s))   EKG 12-lead, complete   Result Value Ref Range    Systolic Blood Pressure  mmHg    Diastolic Blood Pressure  mmHg    Ventricular Rate 82 BPM    Atrial Rate 82 BPM    NY Interval 116 ms    QRS Duration 64 ms     ms    QTc 446 ms    P Axis 68 degrees    R AXIS 66 degrees    T Axis 78 degrees    Interpretation ECG       Sinus rhythm  Anteroseptal infarct , age undetermined  ST & T wave abnormality, consider lateral ischemia  Abnormal ECG  When compared with ECG of 03-JUL-2022 22:16, (unconfirmed)  Premature atrial complexes are no longer Present  Anteroseptal infarct is now Present  T wave inversion now evident in Lateral leads     Potassium   Result Value Ref Range    Potassium 5.6 (H) 3.4 - 5.3  mmol/L   Sodium random urine   Result Value Ref Range    Sodium Urine mmol/L <20 mmol/L   UA with Microscopic   Result Value Ref Range    Color Urine Yellow Colorless, Straw, Light Yellow, Yellow    Appearance Urine Slightly Cloudy (A) Clear    Glucose Urine Negative Negative mg/dL    Bilirubin Urine Negative Negative    Ketones Urine Trace (A) Negative mg/dL    Specific Gravity Urine 1.030 1.003 - 1.035    Blood Urine Small (A) Negative    pH Urine 5.5 5.0 - 7.0    Protein Albumin Urine 100  (A) Negative mg/dL    Urobilinogen Urine Normal Normal, 2.0 mg/dL    Nitrite Urine Negative Negative    Leukocyte Esterase Urine Negative Negative    Mucus Urine Present (A) None Seen /LPF    RBC Urine 15 (H) <=2 /HPF    WBC Urine 5 <=5 /HPF    Squamous Epithelials Urine <1 <=1 /HPF   Glucose by meter   Result Value Ref Range    GLUCOSE BY METER POCT 205 (H) 70 - 99 mg/dL   Blood gas venous with oxyhemoglobin   Result Value Ref Range    pH Venous 7.20 (L) 7.32 - 7.43    pCO2 Venous 81 (HH) 40 - 50 mm Hg    pO2 Venous 47 25 - 47 mm Hg    Bicarbonate Venous 32 (H) 21 - 28 mmol/L    FIO2 4     Oxyhemoglobin Venous 80 (H) 70 - 75 %    Base Excess/Deficit (+/-) 3.1 (H) -7.7 - 1.9 mmol/L   Basic metabolic panel   Result Value Ref Range    Creatinine 2.09 (H) 0.51 - 0.95 mg/dL    Sodium 134 (L) 136 - 145 mmol/L    Potassium 5.5 (H) 3.4 - 5.3 mmol/L    Urea Nitrogen 75.4 (H) 8.0 - 23.0 mg/dL    Chloride 99 98 - 107 mmol/L    Carbon Dioxide (CO2) 30 (H) 22 - 29 mmol/L    Anion Gap 5 (L) 7 - 15 mmol/L    Glucose 203 (H) 70 - 99 mg/dL    GFR Estimate 26 (L) >60 mL/min/1.73m2    Calcium 11.9 (H) 8.8 - 10.2 mg/dL   Magnesium   Result Value Ref Range    Magnesium 3.0 (H) 1.7 - 2.3 mg/dL   Phosphorus   Result Value Ref Range    Phosphorus 3.3 2.5 - 4.5 mg/dL   Glucose by meter   Result Value Ref Range    GLUCOSE BY METER POCT 205 (H) 70 - 99 mg/dL   Ionized Calcium   Result Value Ref Range    Calcium Ionized 4.4 4.4 - 5.2 mg/dL   Blood gas  arterial   Result Value Ref Range    pH Arterial 7.27 (L) 7.35 - 7.45    pCO2 Arterial 62 (H) 35 - 45 mm Hg    pO2 Arterial 140 (H) 80 - 105 mm Hg    FIO2 30     Bicarbonate Arterial 29 (H) 21 - 28 mmol/L    Base Excess/Deficit (+/-) 1.2 -9.0 - 1.8 mmol/L   Basic metabolic panel   Result Value Ref Range    Creatinine 1.92 (H) 0.51 - 0.95 mg/dL    Sodium 133 (L) 136 - 145 mmol/L    Potassium 5.6 (H) 3.4 - 5.3 mmol/L    Urea Nitrogen 72.8 (H) 8.0 - 23.0 mg/dL    Chloride 102 98 - 107 mmol/L    Carbon Dioxide (CO2) 28 22 - 29 mmol/L    Anion Gap 3 (L) 7 - 15 mmol/L    Glucose 168 (H) 70 - 99 mg/dL    GFR Estimate 29 (L) >60 mL/min/1.73m2    Calcium 7.3 (L) 8.8 - 10.2 mg/dL   Glucose by meter   Result Value Ref Range    GLUCOSE BY METER POCT 158 (H) 70 - 99 mg/dL   Basic metabolic panel   Result Value Ref Range    Creatinine 2.17 (H) 0.51 - 0.95 mg/dL    Sodium 135 (L) 136 - 145 mmol/L    Potassium 5.7 (H) 3.4 - 5.3 mmol/L    Urea Nitrogen 84.6 (H) 8.0 - 23.0 mg/dL    Chloride 100 98 - 107 mmol/L    Carbon Dioxide (CO2) 32 (H) 22 - 29 mmol/L    Anion Gap 3 (L) 7 - 15 mmol/L    Glucose 143 (H) 70 - 99 mg/dL    GFR Estimate 25 (L) >60 mL/min/1.73m2    Calcium 8.1 (L) 8.8 - 10.2 mg/dL   Blood gas arterial   Result Value Ref Range    pH Arterial 7.29 (L) 7.35 - 7.45    pCO2 Arterial 67 (H) 35 - 45 mm Hg    pO2 Arterial 93 80 - 105 mm Hg    FIO2 25     Bicarbonate Arterial 32 (H) 21 - 28 mmol/L    Base Excess/Deficit (+/-) 4.6 (H) -9.0 - 1.8 mmol/L   Glucose by meter   Result Value Ref Range    GLUCOSE BY METER POCT 136 (H) 70 - 99 mg/dL   Basic metabolic panel   Result Value Ref Range    Creatinine 2.16 (H) 0.51 - 0.95 mg/dL    Sodium 133 (L) 136 - 145 mmol/L    Potassium 6.0 (H) 3.4 - 5.3 mmol/L    Urea Nitrogen 85.5 (H) 8.0 - 23.0 mg/dL    Chloride 98 98 - 107 mmol/L    Carbon Dioxide (CO2) 32 (H) 22 - 29 mmol/L    Anion Gap 3 (L) 7 - 15 mmol/L    Glucose 169 (H) 70 - 99 mg/dL    GFR Estimate 25 (L) >60 mL/min/1.73m2     Calcium 8.1 (L) 8.8 - 10.2 mg/dL   Glucose by meter   Result Value Ref Range    GLUCOSE BY METER POCT 159 (H) 70 - 99 mg/dL   XR Chest Port 1 View    Narrative    EXAM: XR CHEST PORT 1 VIEW  7/6/2022 12:44 AM     HISTORY:  Post-Op Lung       COMPARISON:  7/5/2022    FINDINGS  Technique: Semiupright AP view of the chest.     Devices: Right internal jugular approach central venous catheter  terminates over the mid SVC. Bilateral apical and basal chest tubes  are not appreciably changed. Clamshell sternotomy wires.    Unchanged streaky perihilar and bibasilar pulmonary opacities. Cardiac  silhouette is stable in size. Aortic arch calcifications. Remaining  trace right apical pneumothorax. No pleural effusion.       Impression    IMPRESSION:     1. Trace remaining right apical pneumothorax with bilateral chest  tubes in place.  2. Support devices stable.  3. Unchanged bilateral perihilar/basilar atelectasis/edema.    I have personally reviewed the examination and initial interpretation  and I agree with the findings.    GABRIELLA SNELL MD         SYSTEM ID:  [id number]   Glucose by meter   Result Value Ref Range    GLUCOSE BY METER POCT 168 (H) 70 - 99 mg/dL   Glucose by meter   Result Value Ref Range    GLUCOSE BY METER POCT 255 (H) 70 - 99 mg/dL   Heparin Unfractionated Anti Xa Level   Result Value Ref Range    Anti Xa Unfractionated Heparin <0.10 For Reference Range, See Comment IU/mL    Narrative    Therapeutic Range: UFH: 0.25-0.50 IU/mL for low intensity dosing,  0.30-0.70 IU/mL for high intensity dosing DVT and PE.  This test is not validated for other direct factor X inhibitors (e.g. rivaroxaban, apixaban, edoxaban, betrixaban, fondaparinux) and should not be used for monitoring of other medications.   Lactic acid whole blood   Result Value Ref Range    Lactic Acid 1.3 0.7 - 2.0 mmol/L   CBC with platelets differential    Narrative    The following orders were created for panel order CBC with platelets  differential.  Procedure                               Abnormality         Status                     ---------                               -----------         ------                     CBC with platelets and d...[[id number]]  Abnormal            Final result               Manual Differential[[id number]]          Abnormal            Final result                 Please view results for these tests on the individual orders.   ALT   Result Value Ref Range    ALT 22 10 - 35 U/L   AST   Result Value Ref Range    AST 24 10 - 35 U/L   Alkaline phosphatase   Result Value Ref Range    Alkaline Phosphatase 59 35 - 104 U/L   Bilirubin direct   Result Value Ref Range    Bilirubin Direct <0.20 0.00 - 0.30 mg/dL   Bilirubin  total   Result Value Ref Range    Bilirubin Total <0.2 <=1.2 mg/dL   Protein total   Result Value Ref Range    Protein Total 4.4 (L) 6.4 - 8.3 g/dL   Basic metabolic panel   Result Value Ref Range    Creatinine 1.98 (H) 0.51 - 0.95 mg/dL    Sodium 132 (L) 136 - 145 mmol/L    Potassium 5.5 (H) 3.4 - 5.3 mmol/L    Urea Nitrogen 82.7 (H) 8.0 - 23.0 mg/dL    Chloride 98 98 - 107 mmol/L    Carbon Dioxide (CO2) 28 22 - 29 mmol/L    Anion Gap 6 (L) 7 - 15 mmol/L    Glucose 244 (H) 70 - 99 mg/dL    GFR Estimate 28 (L) >60 mL/min/1.73m2    Calcium 8.0 (L) 8.8 - 10.2 mg/dL   Magnesium   Result Value Ref Range    Magnesium 2.9 (H) 1.7 - 2.3 mg/dL   Phosphorus   Result Value Ref Range    Phosphorus 3.5 2.5 - 4.5 mg/dL   Potassium whole blood   Result Value Ref Range    Potassium 5.2 3.4 - 5.3 mmol/L   Blood gas arterial   Result Value Ref Range    pH Arterial 7.28 (L) 7.35 - 7.45    pCO2 Arterial 65 (H) 35 - 45 mm Hg    pO2 Arterial 150 (H) 80 - 105 mm Hg    FIO2 35     Bicarbonate Arterial 31 (H) 21 - 28 mmol/L    Base Excess/Deficit (+/-) 3.3 (H) -9.0 - 1.8 mmol/L   CBC with platelets and differential   Result Value Ref Range    WBC Count 9.2 4.0 - 11.0 10e3/uL    RBC Count 2.41 (L) 3.80 - 5.20 10e6/uL    Hemoglobin  7.4 (L) 11.7 - 15.7 g/dL    Hematocrit 25.0 (L) 35.0 - 47.0 %     (H) 78 - 100 fL    MCH 30.7 26.5 - 33.0 pg    MCHC 29.6 (L) 31.5 - 36.5 g/dL    RDW 15.2 (H) 10.0 - 15.0 %    Platelet Count 207 150 - 450 10e3/uL   Manual Differential   Result Value Ref Range    % Neutrophils 85 %    % Lymphocytes 8 %    % Monocytes 5 %    % Eosinophils 0 %    % Basophils 0 %    % Myelocytes 2 %    NRBCs per 100 WBC 1 (H) <=0 %    Absolute Neutrophils 7.8 1.6 - 8.3 10e3/uL    Absolute Lymphocytes 0.7 (L) 0.8 - 5.3 10e3/uL    Absolute Monocytes 0.5 0.0 - 1.3 10e3/uL    Absolute Eosinophils 0.0 0.0 - 0.7 10e3/uL    Absolute Basophils 0.0 0.0 - 0.2 10e3/uL    Absolute Myelocytes 0.2 (H) <=0.0 10e3/uL    Absolute NRBCs 0.1 (H) <=0.0 10e3/uL    RBC Morphology Confirmed RBC Indices     Platelet Assessment  Automated Count Confirmed. Platelet morphology is normal.     Automated Count Confirmed. Platelet morphology is normal.   Glucose by meter   Result Value Ref Range    GLUCOSE BY METER POCT 230 (H) 70 - 99 mg/dL   Glucose by meter   Result Value Ref Range    GLUCOSE BY METER POCT 218 (H) 70 - 99 mg/dL   Glucose by meter   Result Value Ref Range    GLUCOSE BY METER POCT 204 (H) 70 - 99 mg/dL   Potassium   Result Value Ref Range    Potassium 5.4 (H) 3.4 - 5.3 mmol/L   Glucose by meter   Result Value Ref Range    GLUCOSE BY METER POCT 244 (H) 70 - 99 mg/dL

## 2022-07-06 NOTE — PROGRESS NOTES
CLINICAL NUTRITION SERVICES - REASSESSMENT NOTE     Nutrition Prescription    RECOMMENDATIONS FOR MDs/PROVIDERS TO ORDER:  Daily fluid adjustments per MD    Malnutrition Status:    Moderate malnutrition in the context of chronic illness    Recommendations already ordered by Registered Dietitian (RD):  -Continue Novasource Renal @ 35 ml/hr (840 ml) + 1 pkt Prosource daily provides 1720 kcal (30 kcal/kg), 87 g pro (1.5 g/kg), 154 g CHO, 602 ml free water, and 0 g fiber daily  -Continue MVI daily     Future/Additional Recommendations:  -Monitor renal fx  -Continue to monitor TF tolerance, labs, stools, weight trends     EVALUATION OF THE PROGRESS TOWARD GOALS   Diet: Regular; pt eating apple sauce and sips of apple juice     Nutrition Support: Pt tolerating TF at goal.   7/2-7/5: Osmolite 1.5 Rayshawn @ goal of 45ml/hr (1080ml/day) + 1 pkt Prosource daily will provide: 1660 kcals (30 kcal/kg), 78 g PRO (1.4 g/kg), 822 ml free H20, 219 g CHO, and 0 g fiber daily.     7/5-current: Novasource Renal @ 35 ml/hr (840 ml) + 1 pkt Prosource daily provides 1720 kcal (30 kcal/kg), 87 g pro (1.5 g/kg), 154 g CHO, 602 ml free water, and 0 g fiber daily    Pt received an average of 919 ml TF/d over the past 4 days + an average of 1 pkt Prosource daily. This provided an average of 1419 kcal/d (25 kcal/kg) and 69 g protein/d (1.2 g/kg). This met 100% estimated energy needs and 92% estimated protein needs.      NEW FINDINGS   Pt remains critically ill requiring ventilatory and hemodynamic support. Renal fx worsening 7/5, switched to renal TF formula.     GI: Loose stools, holding bowel regimen    Labs: Hyponatremia, hyperkalemia, hypermagnesia     Medications: Reviewed    Weights: No weight loss over the past week.  07/06/22 0000 72.9 kg (160 lb 11.2 oz)   07/04/22 0045 76.2 kg (167 lb 15.9 oz)   07/01/22 0400 75.2 kg (165 lb 12.6 oz)   06/30/22 0200 75.6 kg (166 lb 10.7 oz)   06/28/22 1114 65.7 kg (144 lb 14.4 oz)     MALNUTRITION  %  Intake: Adequate via TF  % Weight Loss: None noted  Subcutaneous Fat Loss: None observed  Muscle Loss: Temporal:  Moderate, Thoracic region (clavicle, acromium bone, deltoid, trapezius, pectoral):  Mild and Upper leg (quadricep, hamstring):  Mild  Fluid Accumulation/Edema: Mild  Malnutrition Diagnosis: Moderate malnutrition in the context of chronic illness    Previous Goals   Total avg nutritional intake to meet a minimum of 25 kcal/kg and 1.3 g PRO/kg daily (per dosing wt 56 kg).  Evaluation: Energy goal met, protein goal not met    Previous Nutrition Diagnosis  Inadequate oral intake related to intubation as evidenced by NPO status and need for EN.   Evaluation: No longer applicable, nutrition diagnosis changed below    CURRENT NUTRITION DIAGNOSIS  Inadequate oral intake related to poor appetite as evidenced by need for EN to meet 100% nutrition needs.       INTERVENTIONS  Implementation  Enteral Nutrition - continue TF as ordered  Multivitamin/mineral supplement therapy    Goals  Total avg nutritional intake to meet a minimum of 25 kcal/kg and 1.3 g PRO/kg daily (per dosing wt 56 kg).    Monitoring/Evaluation  Progress toward goals will be monitored and evaluated per protocol.    Julia Alva, MS, RD, LD  4E (CVICU) RD pager: [phone]  Ascom: [id number]  Weekend/Holiday RD pager: [phone]

## 2022-07-06 NOTE — PROGRESS NOTES
Transplant Social Work Services Progress Note      Date of Initial Social Work Evaluation: 4/8/2021  Collaborated with: n/a    Data: supportive visit with patient.  She reports not feeling well today. affirms this has been very difficult, harder than she imagined it would be.  Intervention: support and encouragement.  Assured her that she was on a path to improvement.  Encouraged her to work hard.  Do what is asked of her.    Assessment: overwhelmed  Education provided by SW: expected post transplant course   Plan:    Discharge Plans in Progress: none at this time    Barriers to d/c plan: critical illness    Follow up Plan: will continue to follow for support, counseling, education and resources.      [facility]ana Valdivia [facility]  Lung Transplant   Phone: [phone] Pager: [phone]

## 2022-07-06 NOTE — PLAN OF CARE
Major Shift Events: Pt is A&Ox4. Whispers and slow speech. Lethargic. Pain all over, gave PRN oxy. Sinus rhythm. MAP goal >65, on levo, labile. BiPap. NJ, TF at goal. No BM. Dong. Potassium 6.0. Gave bumex 5mg, bolus, and calcium gluconate, pt had good urine response. Decided not to start CRRT. Shifted pt potassium, insulin/dextrose. 5 CTs. Pt slept intermittently.  Plan: Wean levo. Monitor CO2 and potassium. Continue w/ POC.  For vital signs and complete assessments, please see documentation flowsheets.

## 2022-07-07 ENCOUNTER — APPOINTMENT (OUTPATIENT)
Dept: GENERAL RADIOLOGY | Facility: CLINIC | Age: 62
DRG: 007 | End: 2022-07-07
Attending: THORACIC SURGERY (CARDIOTHORACIC VASCULAR SURGERY)
Payer: MEDICARE

## 2022-07-07 ENCOUNTER — APPOINTMENT (OUTPATIENT)
Dept: CT IMAGING | Facility: CLINIC | Age: 62
DRG: 007 | End: 2022-07-07
Attending: STUDENT IN AN ORGANIZED HEALTH CARE EDUCATION/TRAINING PROGRAM
Payer: MEDICARE

## 2022-07-07 ENCOUNTER — APPOINTMENT (OUTPATIENT)
Dept: SPEECH THERAPY | Facility: CLINIC | Age: 62
DRG: 007 | End: 2022-07-07
Attending: THORACIC SURGERY (CARDIOTHORACIC VASCULAR SURGERY)
Payer: MEDICARE

## 2022-07-07 ENCOUNTER — APPOINTMENT (OUTPATIENT)
Dept: CARDIOLOGY | Facility: CLINIC | Age: 62
DRG: 007 | End: 2022-07-07
Attending: STUDENT IN AN ORGANIZED HEALTH CARE EDUCATION/TRAINING PROGRAM
Payer: MEDICARE

## 2022-07-07 LAB
ALBUMIN SERPL BCG-MCNC: 2.8 G/DL (ref 3.5–5.2)
ALP SERPL-CCNC: 63 U/L (ref 35–104)
ALT SERPL W P-5'-P-CCNC: 24 U/L (ref 10–35)
AMMONIA PLAS-SCNC: 31 UMOL/L (ref 11–51)
ANION GAP SERPL CALCULATED.3IONS-SCNC: 10 MMOL/L (ref 7–15)
ANION GAP SERPL CALCULATED.3IONS-SCNC: 10 MMOL/L (ref 7–15)
ANION GAP SERPL CALCULATED.3IONS-SCNC: 14 MMOL/L (ref 7–15)
AST SERPL W P-5'-P-CCNC: 23 U/L (ref 10–35)
BACTERIA UR CULT: NO GROWTH
BASE EXCESS BLDA CALC-SCNC: 11.1 MMOL/L (ref -9–1.8)
BASE EXCESS BLDA CALC-SCNC: 9.3 MMOL/L (ref -9–1.8)
BASOPHILS # BLD MANUAL: 0 10E3/UL (ref 0–0.2)
BASOPHILS NFR BLD MANUAL: 0 %
BILIRUB DIRECT SERPL-MCNC: <0.2 MG/DL (ref 0–0.3)
BILIRUB SERPL-MCNC: <0.2 MG/DL
BLD PROD TYP BPU: NORMAL
BLOOD COMPONENT TYPE: NORMAL
BUN SERPL-MCNC: 93.4 MG/DL (ref 8–23)
BUN SERPL-MCNC: 94.8 MG/DL (ref 8–23)
BUN SERPL-MCNC: 99.6 MG/DL (ref 8–23)
CALCIUM SERPL-MCNC: 8 MG/DL (ref 8.8–10.2)
CALCIUM SERPL-MCNC: 8.1 MG/DL (ref 8.8–10.2)
CALCIUM SERPL-MCNC: 8.3 MG/DL (ref 8.8–10.2)
CHLORIDE SERPL-SCNC: 90 MMOL/L (ref 98–107)
CHLORIDE SERPL-SCNC: 90 MMOL/L (ref 98–107)
CHLORIDE SERPL-SCNC: 91 MMOL/L (ref 98–107)
CODING SYSTEM: NORMAL
CREAT SERPL-MCNC: 1.8 MG/DL (ref 0.51–0.95)
CREAT SERPL-MCNC: 1.98 MG/DL (ref 0.51–0.95)
CREAT SERPL-MCNC: 1.99 MG/DL (ref 0.51–0.95)
CROSSMATCH: NORMAL
DEPRECATED HCO3 PLAS-SCNC: 32 MMOL/L (ref 22–29)
DEPRECATED HCO3 PLAS-SCNC: 34 MMOL/L (ref 22–29)
DEPRECATED HCO3 PLAS-SCNC: 35 MMOL/L (ref 22–29)
DONOR IDENTIFICATION: NORMAL
DSA COMMENTS: NORMAL
DSA PRESENT: NO
DSA TEST METHOD: NORMAL
EOSINOPHIL # BLD MANUAL: 0 10E3/UL (ref 0–0.7)
EOSINOPHIL NFR BLD MANUAL: 0 %
ERYTHROCYTE [DISTWIDTH] IN BLOOD BY AUTOMATED COUNT: 15 % (ref 10–15)
GFR SERPL CREATININE-BSD FRML MDRD: 28 ML/MIN/1.73M2
GFR SERPL CREATININE-BSD FRML MDRD: 28 ML/MIN/1.73M2
GFR SERPL CREATININE-BSD FRML MDRD: 32 ML/MIN/1.73M2
GLUCOSE BLDC GLUCOMTR-MCNC: 142 MG/DL (ref 70–99)
GLUCOSE BLDC GLUCOMTR-MCNC: 153 MG/DL (ref 70–99)
GLUCOSE BLDC GLUCOMTR-MCNC: 157 MG/DL (ref 70–99)
GLUCOSE BLDC GLUCOMTR-MCNC: 157 MG/DL (ref 70–99)
GLUCOSE BLDC GLUCOMTR-MCNC: 178 MG/DL (ref 70–99)
GLUCOSE BLDC GLUCOMTR-MCNC: 190 MG/DL (ref 70–99)
GLUCOSE SERPL-MCNC: 181 MG/DL (ref 70–99)
GLUCOSE SERPL-MCNC: 183 MG/DL (ref 70–99)
GLUCOSE SERPL-MCNC: 188 MG/DL (ref 70–99)
HCO3 BLD-SCNC: 37 MMOL/L (ref 21–28)
HCO3 BLD-SCNC: 40 MMOL/L (ref 21–28)
HCT VFR BLD AUTO: 22.7 % (ref 35–47)
HGB BLD-MCNC: 7 G/DL (ref 11.7–15.7)
ISSUE DATE AND TIME: NORMAL
LACTATE SERPL-SCNC: 0.7 MMOL/L (ref 0.7–2)
LVEF ECHO: NORMAL
LYMPHOCYTES # BLD MANUAL: 0.3 10E3/UL (ref 0.8–5.3)
LYMPHOCYTES NFR BLD MANUAL: 3 %
MAGNESIUM SERPL-MCNC: 2.6 MG/DL (ref 1.7–2.3)
MCH RBC QN AUTO: 31.3 PG (ref 26.5–33)
MCHC RBC AUTO-ENTMCNC: 30.8 G/DL (ref 31.5–36.5)
MCV RBC AUTO: 101 FL (ref 78–100)
METAMYELOCYTES # BLD MANUAL: 0.2 10E3/UL
METAMYELOCYTES NFR BLD MANUAL: 2 %
MONOCYTES # BLD MANUAL: 1.2 10E3/UL (ref 0–1.3)
MONOCYTES NFR BLD MANUAL: 11 %
NEUTROPHILS # BLD MANUAL: 8.9 10E3/UL (ref 1.6–8.3)
NEUTROPHILS NFR BLD MANUAL: 84 %
O2/TOTAL GAS SETTING VFR VENT: 21 %
O2/TOTAL GAS SETTING VFR VENT: 25 %
ORGAN: NORMAL
PCO2 BLD: 75 MM HG (ref 35–45)
PCO2 BLD: 84 MM HG (ref 35–45)
PH BLD: 7.29 [PH] (ref 7.35–7.45)
PH BLD: 7.3 [PH] (ref 7.35–7.45)
PHOSPHATE SERPL-MCNC: 5.6 MG/DL (ref 2.5–4.5)
PLAT MORPH BLD: ABNORMAL
PLATELET # BLD AUTO: 254 10E3/UL (ref 150–450)
PO2 BLD: 72 MM HG (ref 80–105)
PO2 BLD: 75 MM HG (ref 80–105)
POTASSIUM SERPL-SCNC: 3.3 MMOL/L (ref 3.4–5.3)
POTASSIUM SERPL-SCNC: 3.8 MMOL/L (ref 3.4–5.3)
POTASSIUM SERPL-SCNC: 4.3 MMOL/L (ref 3.4–5.3)
POTASSIUM SERPL-SCNC: 4.5 MMOL/L (ref 3.4–5.3)
PROT SERPL-MCNC: 4.6 G/DL (ref 6.4–8.3)
RBC # BLD AUTO: 2.24 10E6/UL (ref 3.8–5.2)
RBC MORPH BLD: ABNORMAL
SODIUM SERPL-SCNC: 134 MMOL/L (ref 136–145)
SODIUM SERPL-SCNC: 136 MMOL/L (ref 136–145)
SODIUM SERPL-SCNC: 136 MMOL/L (ref 136–145)
UNIT ABO/RH: NORMAL
UNIT NUMBER: NORMAL
UNIT STATUS: NORMAL
UNIT TYPE ISBT: 5100
WBC # BLD AUTO: 10.6 10E3/UL (ref 4–11)

## 2022-07-07 PROCEDURE — 93325 DOPPLER ECHO COLOR FLOW MAPG: CPT | Mod: 26 | Performed by: STUDENT IN AN ORGANIZED HEALTH CARE EDUCATION/TRAINING PROGRAM

## 2022-07-07 PROCEDURE — 84100 ASSAY OF PHOSPHORUS: CPT | Performed by: SURGERY

## 2022-07-07 PROCEDURE — 250N000013 HC RX MED GY IP 250 OP 250 PS 637: Performed by: SURGERY

## 2022-07-07 PROCEDURE — 99291 CRITICAL CARE FIRST HOUR: CPT | Mod: 24 | Performed by: STUDENT IN AN ORGANIZED HEALTH CARE EDUCATION/TRAINING PROGRAM

## 2022-07-07 PROCEDURE — 82140 ASSAY OF AMMONIA: CPT | Performed by: SURGERY

## 2022-07-07 PROCEDURE — 250N000013 HC RX MED GY IP 250 OP 250 PS 637: Performed by: THORACIC SURGERY (CARDIOTHORACIC VASCULAR SURGERY)

## 2022-07-07 PROCEDURE — G1010 CDSM STANSON: HCPCS | Performed by: RADIOLOGY

## 2022-07-07 PROCEDURE — 250N000011 HC RX IP 250 OP 636: Performed by: THORACIC SURGERY (CARDIOTHORACIC VASCULAR SURGERY)

## 2022-07-07 PROCEDURE — 258N000003 HC RX IP 258 OP 636: Performed by: THORACIC SURGERY (CARDIOTHORACIC VASCULAR SURGERY)

## 2022-07-07 PROCEDURE — 99232 SBSQ HOSP IP/OBS MODERATE 35: CPT | Mod: FS | Performed by: CLINICAL NURSE SPECIALIST

## 2022-07-07 PROCEDURE — 93321 DOPPLER ECHO F-UP/LMTD STD: CPT

## 2022-07-07 PROCEDURE — 250N000012 HC RX MED GY IP 250 OP 636 PS 637: Performed by: INTERNAL MEDICINE

## 2022-07-07 PROCEDURE — 93321 DOPPLER ECHO F-UP/LMTD STD: CPT | Mod: 26 | Performed by: STUDENT IN AN ORGANIZED HEALTH CARE EDUCATION/TRAINING PROGRAM

## 2022-07-07 PROCEDURE — 250N000013 HC RX MED GY IP 250 OP 250 PS 637: Performed by: STUDENT IN AN ORGANIZED HEALTH CARE EDUCATION/TRAINING PROGRAM

## 2022-07-07 PROCEDURE — 94668 MNPJ CHEST WALL SBSQ: CPT

## 2022-07-07 PROCEDURE — 250N000012 HC RX MED GY IP 250 OP 636 PS 637: Performed by: SURGERY

## 2022-07-07 PROCEDURE — 83735 ASSAY OF MAGNESIUM: CPT | Performed by: SURGERY

## 2022-07-07 PROCEDURE — 999N000157 HC STATISTIC RCP TIME EA 10 MIN

## 2022-07-07 PROCEDURE — 258N000003 HC RX IP 258 OP 636: Performed by: STUDENT IN AN ORGANIZED HEALTH CARE EDUCATION/TRAINING PROGRAM

## 2022-07-07 PROCEDURE — 71045 X-RAY EXAM CHEST 1 VIEW: CPT

## 2022-07-07 PROCEDURE — 85007 BL SMEAR W/DIFF WBC COUNT: CPT | Performed by: SURGERY

## 2022-07-07 PROCEDURE — 250N000011 HC RX IP 250 OP 636: Performed by: STUDENT IN AN ORGANIZED HEALTH CARE EDUCATION/TRAINING PROGRAM

## 2022-07-07 PROCEDURE — 250N000009 HC RX 250: Performed by: SURGERY

## 2022-07-07 PROCEDURE — 82374 ASSAY BLOOD CARBON DIOXIDE: CPT | Performed by: STUDENT IN AN ORGANIZED HEALTH CARE EDUCATION/TRAINING PROGRAM

## 2022-07-07 PROCEDURE — 82803 BLOOD GASES ANY COMBINATION: CPT | Performed by: SURGERY

## 2022-07-07 PROCEDURE — G1010 CDSM STANSON: HCPCS

## 2022-07-07 PROCEDURE — G1010 CDSM STANSON: HCPCS | Mod: GC | Performed by: RADIOLOGY

## 2022-07-07 PROCEDURE — 93308 TTE F-UP OR LMTD: CPT | Mod: 26 | Performed by: STUDENT IN AN ORGANIZED HEALTH CARE EDUCATION/TRAINING PROGRAM

## 2022-07-07 PROCEDURE — 250N000013 HC RX MED GY IP 250 OP 250 PS 637

## 2022-07-07 PROCEDURE — 200N000002 HC R&B ICU UMMC

## 2022-07-07 PROCEDURE — 71250 CT THORAX DX C-: CPT | Mod: 26 | Performed by: RADIOLOGY

## 2022-07-07 PROCEDURE — 94640 AIRWAY INHALATION TREATMENT: CPT

## 2022-07-07 PROCEDURE — 83605 ASSAY OF LACTIC ACID: CPT | Performed by: ANESTHESIOLOGY

## 2022-07-07 PROCEDURE — 93325 DOPPLER ECHO COLOR FLOW MAPG: CPT

## 2022-07-07 PROCEDURE — 80048 BASIC METABOLIC PNL TOTAL CA: CPT | Performed by: STUDENT IN AN ORGANIZED HEALTH CARE EDUCATION/TRAINING PROGRAM

## 2022-07-07 PROCEDURE — 71045 X-RAY EXAM CHEST 1 VIEW: CPT | Mod: 26 | Performed by: RADIOLOGY

## 2022-07-07 PROCEDURE — 250N000011 HC RX IP 250 OP 636: Performed by: INTERNAL MEDICINE

## 2022-07-07 PROCEDURE — 84132 ASSAY OF SERUM POTASSIUM: CPT | Performed by: STUDENT IN AN ORGANIZED HEALTH CARE EDUCATION/TRAINING PROGRAM

## 2022-07-07 PROCEDURE — 85027 COMPLETE CBC AUTOMATED: CPT | Performed by: SURGERY

## 2022-07-07 PROCEDURE — P9016 RBC LEUKOCYTES REDUCED: HCPCS | Performed by: STUDENT IN AN ORGANIZED HEALTH CARE EDUCATION/TRAINING PROGRAM

## 2022-07-07 PROCEDURE — 82248 BILIRUBIN DIRECT: CPT | Performed by: SURGERY

## 2022-07-07 PROCEDURE — 250N000011 HC RX IP 250 OP 636

## 2022-07-07 PROCEDURE — 70450 CT HEAD/BRAIN W/O DYE: CPT | Mod: 26 | Performed by: RADIOLOGY

## 2022-07-07 PROCEDURE — 94640 AIRWAY INHALATION TREATMENT: CPT | Mod: 76

## 2022-07-07 PROCEDURE — 92526 ORAL FUNCTION THERAPY: CPT | Mod: GN

## 2022-07-07 PROCEDURE — 94660 CPAP INITIATION&MGMT: CPT

## 2022-07-07 PROCEDURE — 71250 CT THORAX DX C-: CPT | Mod: MG

## 2022-07-07 PROCEDURE — 99233 SBSQ HOSP IP/OBS HIGH 50: CPT | Mod: 24 | Performed by: INTERNAL MEDICINE

## 2022-07-07 RX ORDER — SIMETHICONE 80 MG
80 TABLET,CHEWABLE ORAL EVERY 6 HOURS PRN
Status: DISCONTINUED | OUTPATIENT
Start: 2022-07-07 | End: 2022-07-16

## 2022-07-07 RX ORDER — BUMETANIDE 0.25 MG/ML
6 INJECTION INTRAMUSCULAR; INTRAVENOUS ONCE
Status: COMPLETED | OUTPATIENT
Start: 2022-07-07 | End: 2022-07-07

## 2022-07-07 RX ORDER — CALCIUM CARBONATE 500 MG/1
500-1000 TABLET, CHEWABLE ORAL 3 TIMES DAILY PRN
Status: DISCONTINUED | OUTPATIENT
Start: 2022-07-07 | End: 2022-07-28

## 2022-07-07 RX ORDER — THIAMINE HYDROCHLORIDE 100 MG/ML
100 INJECTION, SOLUTION INTRAMUSCULAR; INTRAVENOUS DAILY
Status: DISCONTINUED | OUTPATIENT
Start: 2022-07-07 | End: 2022-07-12

## 2022-07-07 RX ORDER — BUMETANIDE 0.25 MG/ML
5 INJECTION INTRAMUSCULAR; INTRAVENOUS ONCE
Status: DISCONTINUED | OUTPATIENT
Start: 2022-07-07 | End: 2022-07-07

## 2022-07-07 RX ORDER — POTASSIUM CHLORIDE 1.5 G/1.58G
20 POWDER, FOR SOLUTION ORAL ONCE
Status: COMPLETED | OUTPATIENT
Start: 2022-07-07 | End: 2022-07-07

## 2022-07-07 RX ORDER — METHOCARBAMOL 500 MG/1
500 TABLET, FILM COATED ORAL 4 TIMES DAILY PRN
Status: DISCONTINUED | OUTPATIENT
Start: 2022-07-07 | End: 2022-07-14

## 2022-07-07 RX ADMIN — VASOPRESSIN 1.4 UNITS/HR: 20 INJECTION INTRAVENOUS at 04:16

## 2022-07-07 RX ADMIN — INSULIN ASPART 1 UNITS: 100 INJECTION, SOLUTION INTRAVENOUS; SUBCUTANEOUS at 15:40

## 2022-07-07 RX ADMIN — HYDROXYZINE HYDROCHLORIDE 50 MG: 25 TABLET ORAL at 02:57

## 2022-07-07 RX ADMIN — NYSTATIN 1000000 UNITS: 100000 SUSPENSION ORAL at 20:14

## 2022-07-07 RX ADMIN — INSULIN ASPART 3 UNITS: 100 INJECTION, SOLUTION INTRAVENOUS; SUBCUTANEOUS at 08:24

## 2022-07-07 RX ADMIN — NYSTATIN 1000000 UNITS: 100000 SUSPENSION ORAL at 08:20

## 2022-07-07 RX ADMIN — HEPARIN SODIUM 5000 UNITS: 5000 INJECTION, SOLUTION INTRAVENOUS; SUBCUTANEOUS at 14:55

## 2022-07-07 RX ADMIN — ACETYLCYSTEINE 2 ML: 200 SOLUTION ORAL; RESPIRATORY (INHALATION) at 16:37

## 2022-07-07 RX ADMIN — METHOCARBAMOL 500 MG: 500 TABLET ORAL at 11:46

## 2022-07-07 RX ADMIN — Medication 40 MG: at 08:22

## 2022-07-07 RX ADMIN — MIDODRINE HYDROCHLORIDE 20 MG: 5 TABLET ORAL at 15:31

## 2022-07-07 RX ADMIN — CEFTAZIDIME 2 G: 2 INJECTION, POWDER, FOR SOLUTION INTRAVENOUS at 08:22

## 2022-07-07 RX ADMIN — NYSTATIN 1000000 UNITS: 100000 SUSPENSION ORAL at 15:31

## 2022-07-07 RX ADMIN — ACETYLCYSTEINE 2 ML: 200 SOLUTION ORAL; RESPIRATORY (INHALATION) at 08:13

## 2022-07-07 RX ADMIN — ASPIRIN 81 MG CHEWABLE TABLET 81 MG: 81 TABLET CHEWABLE at 08:20

## 2022-07-07 RX ADMIN — INSULIN ASPART 1 UNITS: 100 INJECTION, SOLUTION INTRAVENOUS; SUBCUTANEOUS at 04:07

## 2022-07-07 RX ADMIN — LEVALBUTEROL HYDROCHLORIDE 1.25 MG: 1.25 SOLUTION RESPIRATORY (INHALATION) at 19:21

## 2022-07-07 RX ADMIN — CALCIUM CARBONATE 600 MG (1,500 MG)-VITAMIN D3 400 UNIT TABLET 1 TABLET: at 18:09

## 2022-07-07 RX ADMIN — BUMETANIDE 6 MG: 0.25 INJECTION INTRAMUSCULAR; INTRAVENOUS at 21:27

## 2022-07-07 RX ADMIN — OXYCODONE HYDROCHLORIDE 10 MG: 10 TABLET ORAL at 00:27

## 2022-07-07 RX ADMIN — Medication 1 PACKET: at 08:20

## 2022-07-07 RX ADMIN — LEVALBUTEROL HYDROCHLORIDE 1.25 MG: 1.25 SOLUTION RESPIRATORY (INHALATION) at 13:18

## 2022-07-07 RX ADMIN — NYSTATIN 1000000 UNITS: 100000 SUSPENSION ORAL at 11:46

## 2022-07-07 RX ADMIN — INSULIN ASPART 2 UNITS: 100 INJECTION, SOLUTION INTRAVENOUS; SUBCUTANEOUS at 00:26

## 2022-07-07 RX ADMIN — BUMETANIDE 6 MG: 0.25 INJECTION, SOLUTION INTRAMUSCULAR; INTRAVENOUS at 09:26

## 2022-07-07 RX ADMIN — LEVALBUTEROL HYDROCHLORIDE 1.25 MG: 1.25 SOLUTION RESPIRATORY (INHALATION) at 08:13

## 2022-07-07 RX ADMIN — LIDOCAINE PATCH 4% 2 PATCH: 40 PATCH TOPICAL at 00:27

## 2022-07-07 RX ADMIN — MYCOPHENOLATE MOFETIL 1500 MG: 200 POWDER, FOR SUSPENSION ORAL at 20:14

## 2022-07-07 RX ADMIN — FLUDROCORTISONE ACETATE 0.1 MG: 0.1 TABLET ORAL at 08:20

## 2022-07-07 RX ADMIN — MIDODRINE HYDROCHLORIDE 20 MG: 5 TABLET ORAL at 08:20

## 2022-07-07 RX ADMIN — OXYCODONE HYDROCHLORIDE 5 MG: 5 TABLET ORAL at 14:55

## 2022-07-07 RX ADMIN — SODIUM ZIRCONIUM CYCLOSILICATE 10 G: 10 POWDER, FOR SUSPENSION ORAL at 08:24

## 2022-07-07 RX ADMIN — OXYCODONE HYDROCHLORIDE 10 MG: 10 TABLET ORAL at 08:32

## 2022-07-07 RX ADMIN — TACROLIMUS 6 MG: 5 CAPSULE ORAL at 08:21

## 2022-07-07 RX ADMIN — HYDROCORTISONE SODIUM SUCCINATE 50 MG: 100 INJECTION, POWDER, FOR SOLUTION INTRAMUSCULAR; INTRAVENOUS at 21:36

## 2022-07-07 RX ADMIN — VALGANCICLOVIR HYDROCHLORIDE 450 MG: 50 POWDER, FOR SOLUTION ORAL at 11:48

## 2022-07-07 RX ADMIN — ACETAMINOPHEN 975 MG: 325 TABLET, FILM COATED ORAL at 11:46

## 2022-07-07 RX ADMIN — ACETYLCYSTEINE 2 ML: 200 SOLUTION ORAL; RESPIRATORY (INHALATION) at 13:18

## 2022-07-07 RX ADMIN — VANCOMYCIN HYDROCHLORIDE 1500 MG: 500 INJECTION, POWDER, LYOPHILIZED, FOR SOLUTION INTRAVENOUS at 18:39

## 2022-07-07 RX ADMIN — HYDROXYZINE HYDROCHLORIDE 50 MG: 25 TABLET ORAL at 14:55

## 2022-07-07 RX ADMIN — ACETAMINOPHEN 975 MG: 325 TABLET, FILM COATED ORAL at 04:08

## 2022-07-07 RX ADMIN — INSULIN ASPART 1 UNITS: 100 INJECTION, SOLUTION INTRAVENOUS; SUBCUTANEOUS at 11:48

## 2022-07-07 RX ADMIN — POTASSIUM CHLORIDE 20 MEQ: 1.5 POWDER, FOR SOLUTION ORAL at 18:11

## 2022-07-07 RX ADMIN — TACROLIMUS 6 MG: 5 CAPSULE ORAL at 18:09

## 2022-07-07 RX ADMIN — ACETYLCYSTEINE 2 ML: 200 SOLUTION ORAL; RESPIRATORY (INHALATION) at 19:21

## 2022-07-07 RX ADMIN — INSULIN ASPART 1 UNITS: 100 INJECTION, SOLUTION INTRAVENOUS; SUBCUTANEOUS at 20:14

## 2022-07-07 RX ADMIN — METHOCARBAMOL 500 MG: 500 TABLET ORAL at 08:20

## 2022-07-07 RX ADMIN — SODIUM ZIRCONIUM CYCLOSILICATE 10 G: 10 POWDER, FOR SUSPENSION ORAL at 14:59

## 2022-07-07 RX ADMIN — MULTIVITAMIN 15 ML: LIQUID ORAL at 08:20

## 2022-07-07 RX ADMIN — LEVALBUTEROL HYDROCHLORIDE 1.25 MG: 1.25 SOLUTION RESPIRATORY (INHALATION) at 16:38

## 2022-07-07 RX ADMIN — THIAMINE HYDROCHLORIDE 100 MG: 100 INJECTION, SOLUTION INTRAMUSCULAR; INTRAVENOUS at 21:38

## 2022-07-07 RX ADMIN — HYDROCORTISONE SODIUM SUCCINATE 50 MG: 100 INJECTION, POWDER, FOR SOLUTION INTRAMUSCULAR; INTRAVENOUS at 04:07

## 2022-07-07 RX ADMIN — MYCOPHENOLATE MOFETIL 1500 MG: 200 POWDER, FOR SUSPENSION ORAL at 11:48

## 2022-07-07 RX ADMIN — CALCIUM CARBONATE 600 MG (1,500 MG)-VITAMIN D3 400 UNIT TABLET 1 TABLET: at 08:20

## 2022-07-07 RX ADMIN — HYDROCORTISONE SODIUM SUCCINATE 50 MG: 100 INJECTION, POWDER, FOR SOLUTION INTRAMUSCULAR; INTRAVENOUS at 15:31

## 2022-07-07 RX ADMIN — ACETAMINOPHEN 975 MG: 325 TABLET, FILM COATED ORAL at 20:14

## 2022-07-07 RX ADMIN — SIMETHICONE 80 MG: 80 TABLET, CHEWABLE ORAL at 21:47

## 2022-07-07 RX ADMIN — HYDROCORTISONE SODIUM SUCCINATE 50 MG: 100 INJECTION, POWDER, FOR SOLUTION INTRAMUSCULAR; INTRAVENOUS at 09:26

## 2022-07-07 RX ADMIN — CALCIUM CARBONATE (ANTACID) CHEW TAB 500 MG 500 MG: 500 CHEW TAB at 21:47

## 2022-07-07 RX ADMIN — MIDODRINE HYDROCHLORIDE 20 MG: 5 TABLET ORAL at 00:27

## 2022-07-07 RX ADMIN — HEPARIN SODIUM 5000 UNITS: 5000 INJECTION, SOLUTION INTRAVENOUS; SUBCUTANEOUS at 06:00

## 2022-07-07 RX ADMIN — HEPARIN SODIUM 5000 UNITS: 5000 INJECTION, SOLUTION INTRAVENOUS; SUBCUTANEOUS at 21:24

## 2022-07-07 ASSESSMENT — ACTIVITIES OF DAILY LIVING (ADL)
ADLS_ACUITY_SCORE: 34
ADLS_ACUITY_SCORE: 38
ADLS_ACUITY_SCORE: 34
ADLS_ACUITY_SCORE: 38
ADLS_ACUITY_SCORE: 34
ADLS_ACUITY_SCORE: 38
ADLS_ACUITY_SCORE: 31
ADLS_ACUITY_SCORE: 34
ADLS_ACUITY_SCORE: 38

## 2022-07-07 NOTE — PLAN OF CARE
Goal Outcome Evaluation:    ICU End of Shift Summary. See flowsheets for vital signs and detailed assessment.    Changes this shift: A&O but delirious and forgetful. Head CT done. Using PRN oxy and hydroxyzine for incisional pain. Able to wean off pressors (Levo/Vaso). ECHO done. CVP 9-13; bumex x2. Used HFNC 21-30%, 20-30 LPM most of shift. PaCO2 = 84, switched back to BiPap briefly before patient had outburst likely r/t anxiety from BiPap. Chest CT done. Chest tubes in place with fair amount of output. Dong in place with adequate output. K+ = 4.5 --> 3.3; MD notified. Giving 20 mEq of KCL and rechecking K+ this evening; plan to hold PM dose of Lokelma. 1 un PRBCs given (hgb=7.0.). Up to chair with assist.    Plan:  Trend labs, wean O2 support as able. Pain management.      Problem: COPD (Chronic Obstructive Pulmonary Disease) Comorbidity  Goal: Maintenance of COPD Symptom Control  Outcome: Ongoing, Progressing     Problem: Heart Failure Comorbidity  Goal: Maintenance of Heart Failure Symptom Control  Outcome: Ongoing, Progressing     Problem: Hypertension Comorbidity  Goal: Blood Pressure in Desired Range  Outcome: Ongoing, Progressing

## 2022-07-07 NOTE — PROGRESS NOTES
Nephrology Progress Note  07/07/2022         Sofie Rodriguez is a 62 yom with hx of HTN, Hep C, osetopenia, previous polysubstance use (stopped 2019) and severe COPD who is s/p BSLT on 6/28/2022.  Had ~2.5h of bypass time, was uncomplicated but now has post op BACILIO in setting of continued need for vasopressors thought to be due to vasoplegia.  Had radial artery thrombus requiring thrombectomy on 7/2.  Nephrology consulted for BACILIO with mild hyperkalemia and oliguric UOP.       Interval History :   Mrs Higgins's Cr is stable at ~2, recovery is likely delayed due to continued vasoplegia requiring 2 pressors.  Was slightly net negative with aggressive diuresis, can target net even to slightly negative again today and continue to give time for vasoplegia to resolve at which point her renal fx will likely improve.       Assessment & Recommendations:   BACILIO-Normal baseline renal fx historically and at time of surgery.  No renal imaging but low suspicion of obstruction so will consider if she does not show improvement.  BACILIO is likely hypoperfusion with ~2.5h of bypass time and tacrolimus although levels have been within range.  Urine Na is low although this is difficult to interpret with tacrolimus as it can cause renal vasoconstriction.  Will manage this medically for now and continue to monitor.  UA initially showed hyaline casts on 6/28, now with some protein which is likely tubular range and relatively concentrated SG at 1.030 likely related to continued borderline BP's.  Noted that she did not get contrast for thrombectomy on 7/2.  Cr appearing to plateau, K remains an issue but slowly improving with switch to renal TF and increasing UOP.                -BACILIO, likely hypoperfusion and need for tacro.                  -Continue to avoid nephrotoxins, norepi being weaned but would keep SBP >100, hypotensive due to vasoplegia.                  -Cr slightly improved, UOP increasing with high dose diuretics.  No indication for  "RRT.       Volume-Edematous and net positive 9L since surgery, net negative slightly yesterday with aggressive diuresis.  Intravascular volume is difficult to tell, likely euvolemic with persistent vasoplegia, on low dose norepi and vaso.       Electrolytes-K 4.5, bicarb 32 which likely is some appropriate compensation for hypercapnia.        BMD-Ca up at 8.0, iCal WNL throughout, will follow.  No issues with Mg/Phos.       Lung Tx-On Tacro, levels have been within range, last level 4.8.  Has hypercapnia despite transplant and reasonable CXR/oxygenation, team expects this to improve with time.       Anemia-Hgb 7.4, acute management per team.       Nutrition-Started on TF, changed to renal TF     Time spent: 30 minutes on this date of encounter for chart review, physical exam, medical decision making and co-ordination of care.      Seen and discussed with Dr Arboleda     Recommendations were communicated to primary team via verbal communication.        RUFUS Cedeño CNS  Clinical Nurse Specialist  [phone]    Review of Systems:   I reviewed the following systems:  Gen: No fevers or chills  CV: No CP at rest  Resp: No SOB at rest  GI: No N/V    Physical Exam:   I/O last 3 completed shifts:  In: 2155.26 [I.V.:435.26; NG/GT:880]  Out: 2900 [Urine:1880; Chest Tube:1020]   /54 (BP Location: Right arm)   Pulse 80   Temp 97.4  F (36.3  C) (Axillary)   Resp 22   Ht 1.575 m (5' 2\")   Wt 74.8 kg (164 lb 14.5 oz)   SpO2 92%   BMI 30.16 kg/m       GENERAL APPEARANCE: On Bipap, in mild distress.    EYES: No scleral icterus  NECK:  No JVD  Pulmonary: lungs clear to auscultation with equal breath sounds bilaterally, no clubbing or cyanosis  CV: Regular rhythm, normal rate, no rub   - Edema+2 generalized.   GI: soft, nontender, normal bowel sounds  MS: no evidence of inflammation in joints, no muscle tenderness  : + Dong  SKIN: no rash, warm, dry  NEURO: Answering questions appropriately, no focal " deficits.      Labs:   All labs reviewed by me  Electrolytes/Renal - Recent Labs   Lab Test 07/07/22  0406 07/07/22 0323 07/07/22  0004 07/07/22  0003 07/06/22 2014 07/06/22 2013 07/06/22 0405 07/06/22 0337 07/05/22  1214 07/05/22  1207   NA  --  136  --  134*  --  134*   < > 132*   < > 134*   POTASSIUM  --  4.5  --  4.3  --  4.9   < > 5.2  5.5*   < > 5.5*   CHLORIDE  --  90*  --  90*  --  90*   < > 98   < > 99   CO2  --  32*  --  34*  --  32*   < > 28   < > 30*   BUN  --  94.8*  --  93.4*  --  91.5*   < > 82.7*   < > 75.4*   CR  --  1.99*  --  1.98*  --  2.01*   < > 1.98*   < > 2.09*   * 183* 178* 188*   < > 200*   < > 244*   < > 203*   ESTUARDO  --  8.1*  --  8.0*  --  8.2*   < > 8.0*   < > 11.9*   MAG  --  2.6*  --   --   --   --   --  2.9*  --  3.0*   PHOS  --  5.6*  --   --   --   --   --  3.5  --  3.3    < > = values in this interval not displayed.       CBC -   Recent Labs   Lab Test 07/06/22 0337 07/05/22  0807 07/05/22  0345   WBC 9.2 9.5 11.4*   HGB 7.4* 7.9* 8.2*    190 203       LFTs -   Recent Labs   Lab Test 07/07/22 0323 07/06/22 0337 07/04/22 0323 06/30/22  0355   ALKPHOS 63 59 60 35   BILITOTAL <0.2 <0.2 <0.2 0.3   ALT 24 22 24 23   AST 23 24 20 65*   PROTTOTAL 4.6* 4.4* 4.4* 4.0*   ALBUMIN 2.8*  --  2.6* 2.6*       Iron Panel - No lab results found.        Current Medications:    acetaminophen  975 mg Oral Q8H     acetylcysteine  2 mL Nebulization 4x Daily     aspirin  81 mg Oral Daily     calcium carbonate 600 mg-vitamin D 400 units  1 tablet Oral BID w/meals     cefTAZidime  2 g Intravenous Q24H     fludrocortisone  0.1 mg Oral Daily     heparin ANTICOAGULANT  5,000 Units Subcutaneous Q8H TONY     hydrocortisone sodium succinate PF  50 mg Intravenous Q6H     insulin aspart  1-12 Units Subcutaneous Q4H     insulin glargine  35 Units Subcutaneous QAM AC     levalbuterol  1.25 mg Nebulization 4x Daily     lidocaine  2 patch Transdermal Q24H     lidocaine   Transdermal Q8H TOYN      methocarbamol  500 mg Oral 4x Daily     midodrine  20 mg Oral Q8H     multivitamins w/minerals  15 mL Per Feeding Tube Daily     mycophenolate  1,500 mg Oral BID    Or     mycophenolate  1,500 mg Oral or NG Tube BID     nystatin  1,000,000 Units Swish & Swallow 4x Daily     pantoprazole  40 mg Oral or Feeding Tube Daily     polyethylene glycol  17 g Oral BID     protein modular  1 packet Per Feeding Tube Daily     senna-docusate  2 tablet Oral BID     sodium chloride (PF)  3 mL Intracatheter Q8H     sodium zirconium cyclosilicate  10 g Oral TID     sulfamethoxazole-trimethoprim  1 tablet Oral or Feeding Tube Once per day on Mon Wed Fri     tacrolimus  6 mg Oral BID IS     valGANciclovir  450 mg Oral or NG Tube Once per day on Mon Thu       dextrose       heparin for  units in  mL (1 unit/mL for Interventional Radiology) 3 Units/hr (07/06/22 2016)     norepinephrine 0.03 mcg/kg/min (07/07/22 0700)     vasopressin 1.4 Units/hr (07/07/22 0700)

## 2022-07-07 NOTE — PLAN OF CARE
ICU End of Shift Summary. See flowsheets for vital signs and detailed assessment.    Changes this shift: Lethargic, disoriented to time and situation at 2000. Disoriented to time at 0400. Bipap settings adjusted per RT, 18/8, RR:22. ABGs drawn, showing improvement on Bipap.Intermittent tachypnea, managed with pain medication, rest, and relaxation. Labile blood pressures, still requiring pressors. Levo ranges between 0.03-0.12 and Vaso decreased to 1.4    Occasional PVCs and PACs increased during moments of restlessness. Continues to have tremors in BUE. Ectopy, tremors, and tachypnea may be related to increased pain. Noted decrease after pain medication. Pain managed with tylenol, oxycodone, robaxin, and dilaudid.   Right arm incision dressing changed, bleeding noted as adhesive was secured to wound. Pressure held and pressure dressing applied. Hemostasis achieved, currently dressed with vaseline gauze to prevent adhesion to clots, dry gauze, and coban.     No longer need to draw VBGs per team, however not wanting to discontinue order at this time.     Plan: Continue to assess and manage pain.

## 2022-07-07 NOTE — PROGRESS NOTES
Pulmonary Medicine  Cystic Fibrosis - Lung Transplant Team  Progress Note  2022     Patient: Sofie Rodriguez  MRN: [id number]  : 1960 (age 62 year old)  Transplant: 2022 (Lung), POD #9  Admission date: 2022    Assessment & Plan:     Sofie Rodriguez is a 62 year old female with a PMH significant for end stage COPD, HTN, Hep C, and osteopenia as well as former methamphetamine use.  Pt. is now s/p BSLT on 22.  Surgery on CPB, uncomplicated, lungs slightly undersized for chest. Did require some moderate volume resuscitation with low dose pressors post transplant. Extubated . Persistent CO2 retention, with limited improvement with BiPAP. Etiology is unclear but appears to be a resp drive problem. Persistent low dose pressor requirement of unclear etiology.  Left radial artery thrombus (presumed secondary to arterial line) with thrombectomy under local anesthesia and MAC on .     Recommendations today:   - Continue Bipap for CO2 retention/acidosis at night and as needed during the day depending on VBG results  - Monitor VBGs  - Diuresis per renal recommendations  - Sputum airway cultures with stenotrophomonas    -continue ceftaz for a total fo 14 days  - Consider adding coverage for strep pneumoniae--ceftaz unlikely to cover   -placed order for vanco  - Tacro recheck Fri and Sat (ordered)  - Prednisolone held while on stress dose hydrocortisone.  Reinitiate prednisolone when hydrocortisone is less than 120 mg in 24 hours.  - begin valcyte prophylaxis    - Hold on bactrim prophylaxis  given elevated creatinine  - Unable to complete metabolic cart because of hypoxia   - study does not suggest over feeding  - agree with echo today--found to have flow murmur  - CT chest today reviewed by me   - will discuss with transplant team need for repeat bronch     S/p bilateral sequential lung transplant (BSLT) for end stage COPD:  Acute on chronic hypoxic respiratory failure: No evidence of  PGD post operatively, Extubated to 2L NC on 6/30.  Ongoing low dose pressor requirement.  Persistent CO2 retention but the patient is not very tachypneic/dyspneic so it appears to be a drive issue.   - 7/7 chest x-ray and chest x-ray reviewed by me  - CO2 retention but oxygenating well and no evidence of resp distress. Likely reflects preop/chronic CO2 retention. May take some time to re-equilibrate. BiPAP at night and through the day as needed depending on VBG.  Minimize sedation. Reduce supplemental O2 to keep SaO2 93-96%. Monitor VBGs  -RQ study performed, will review with nutrition  -diaphragm assessment by US does not show dysfunction per CVICU  - Nebs: levalbuterol and Mucomyst QID   - Pulmonary toilet with chest physiotherapy QID (addition of Aerobika and incentive spirometry once extubated)  - DSA on 7/5 (pending) then one month post-transplant  - Ammonia monitoring every twice weekly  x 3 weeks (screening for hyperammonemia post-lung transplant). Ammonia 31 (7/7)  - Bronch 6/29 with patent airways, no significant ischemic reperfusion injury, no significant edema, occasional mucous plugs in lower lobes bilaterally but removed upon therapeutic suctioning.   - PVTS catheters placed 6/29 but removed on 7/2 due to the requirement for heparinization for radial artery thrombectomy.  - Chest tubes managed by surgical team  - Daily CXR  - Post-pyloric feeding tube placed by GI   --tube feeds at goal  - Explant pathology with end stage emphysema as expected and all lymph nodes benign.      Immunosuppression:  Induction therapy with basiliximab (and high dose IV steroid) given intraoperatively, repeating basiliximab dose on POD#4.  - Tacrolimus goal tacrolimus level 8-12.   Date Tacro Level Intervention   6/29 <1.0 Continue current dose, 1 mg bid   6/30 3.0 Increase to 2 mg bid   7/1 6.9 No adjustment    7/2 9.1  continue current dose    7/3  8.6  switch to tacrolimus by feeding tube 4 mg twice daily starting tomorrow  (7/4) morning   7/4 10.2 Switched to enteral today. Not steady state. Continue current dose.   7/5  7.7 Not steady state, continue current dose.   7/6 4.8   increased to 6 mg twice daily.                                   - MMF 1500 mg BID  - Methylprednisolone 125 mg x 3 doses completed.  Prednisone held while on stress dose hydrocortisone.  Reinitiate prednisolone when hydrocortisone is less than 120 mg in 24 hours.  Date AM dose (mg) PM dose (mg)   7/7 15 15   7/14 15 12.5   7/21 12.5 12.5   8/4 12.5 10   8/18 10 10   9/15 10 7.5   10/13 7.5 7.5   11/10 7.5 5   12/8 5 5   1/5/23 5 2.5      Prophylaxis:   - Bactrim for PJP ppx, begin 7/7, every other day for elevated creatinine  - VGCV for CMV ppx (as below to start on 7/7)  - Nystatin for oral candidiasis ppx, 6 month course  - See below for serologies and viral ppx:    Donor Recipient Intervention   CMV status + + Valganciclovir POD #8-90   EBV status + + EBV check monthly   HSV status N/A + No Acyclovir prophylaxis      Hemodynamics: Persistent hypotension/pressor requirements.  Etiology unclear. - Echocardiogram with normal ejection fraction. No valvular disease  Started fludrocortisone for hypotension and hyperkalemia 7/6  Trial of stress dose steroids starting 7/6  CT head 7/7 without intracranial pathology to explain hypotension.    BACILIO: Creatinine rising, likely multifactorial including bactrim and hypotension. Unlikely that tacro has been playing a significant role since levels have generally been low.  Volume status is unclear.  Hypotension and rising creatinine suggests she is volume depleted but intake has generally been greater than output by greater than a liter each day and the patient does have some dependent edema.  -Diuresis per nephrology recommendations.  -Fludrocortisone for hyperkalemia.     Left hand ischemia:  Radial artery thrombosis identified on duplex Doppler.  Thrombectomy under local anesthesia and MAC successful on 7/2. Completed  high intensity heparin.  Continue daily aspirin.   -some bleeding overnight that achieved stasis    ID: Prior history of colonization with Mycobacterium peregrinum     Fevers: Febrile to 100.6 on 6/30, resolved within 24 hours.   - AFB to be sent on all future bronchs, last on 6/29  - IgG at one month 7/29 6/30 blood culture negative to date  6/29 respiratory cultures negative to date  6/28 respiratory culture with Stenotrophomonas maltophilia and Streptococcus pneumoniae        Stenotrophomonas Maltophilia: Noted in right explanted lung washings.   -  Sensitivities reviewed. Stopped treatment bactrim. Continue ceftazidime X 14d  --vanco restarted 7/7 for coverage of strep pneumoniae     H/O Hep C: C: Diagnosed in 1980s, 2 mos of treatment, quant negative since 10/2017, last positive 2/20/17 ([phone]).Glenis positive on 6/2021 with negative HCV PCR. Hx of remote ETOH abuse. MR Elastography 4/27/21 with hepatology review and consult without any concerns post transplant.   6/29 HIV RNA negative  Hepatitis B DNA negative  Hepatitis C RNA negative  Hepatitis C antibody positive (old)    History of steroid induced hyperglycemia: Well controlled in outpatient. Management by primary team currently on insulin gtt, may need endo consult prior to discharge.      We appreciate the excellent care provided by the CVTS and CVICU teams.  Recommendations communicated via in person rounding and this note.  Will continue to follow along closely, please do not hesitate to call with any questions or concerns.     Monik Paredes MD MPH  Associate Professor of Medicine  Pager [phone]     Subjective & Interval History:     Patient unable to cooperate with interview or exam.  Opened eyes and followed some simple commands.    Review of Systems:     Unable to obtain.    Physical Exam:     All notes, images, and labs from past 24 hours (at minimum) were reviewed.    Vital signs:  Temp: 97.6  F (36.4  C) Temp src: Oral   Pulse: 103    "Resp: 18 SpO2: 97 % O2 Device: High Flow Nasal Cannula (HFNC) Oxygen Delivery: 30 LPM Height: 157.5 cm (5' 2\") Weight: 74.8 kg (164 lb 14.5 oz)  I/O:     Intake/Output Summary (Last 24 hours) at 7/7/2022 1529  Last data filed at 7/7/2022 1500  Gross per 24 hour   Intake 2154.02 ml   Output 2900 ml   Net -745.98 ml     Constitutional: Lying in bed, in no apparent distress.   HEENT: Eyes with pink conjunctivae, anicteric.  Oral mucosa moist without lesions.  Neck supple without lymphadenopathy.   PULM: Fair air flow bilaterally.  No crackles, no rhonchi, no wheezes.    CV: Normal S1 and S2.  Tachycardic RR.  ++ holosystolic murmur, + gallop, or rub.  + peripheral edema.   ABD: NABS, soft, nontender, + distended.    MSK: Moves all extremities.  No apparent muscle wasting.   NEURO: somnolent, minimally conversant.   SKIN: Warm, dry.  No rash on limited exam.   PSYCH: calm.     Lines, Drains, and Devices:  Arterial Line 07/05/22 Brachial (Active)   Site Assessment WD 07/07/22 1200   Line Status Pulsatile blood flow 07/07/22 1200   Arterine Line Cap Change Due 07/08/22 07/07/22 1200   Art Line Waveform Appropriate 07/07/22 1200   Art Line Interventions Leveled;Flushed per protocol;Connections checked and tightened 07/07/22 1200   Color/Movement/Sensation Capillary refill less than 3 sec 07/07/22 1200   Line Necessity Yes, meets criteria 07/07/22 1200   Dressing Type Transparent 07/07/22 1200   Dressing Status Clean, dry, intact 07/07/22 1200   Dressing Intervention Dressing changed/new dressing 07/05/22 1200   Dressing Change Due 07/10/22 07/07/22 1200   Number of days: 2       CVC Double Lumen Right Internal jugular (Active)   Site Assessment WDL 07/07/22 1200   Dressing Type Chlorhexidine disk;Transparent 07/07/22 1200   Dressing Status clean;dry;intact 07/07/22 1200   Dressing Intervention dressing reinforced 07/04/22 0000   Dressing Change Due 07/10/22 07/07/22 1200   Tubing Change secondary tubing;primary tubing " 06/29/22 0400   Line Necessity yes, meets criteria 07/07/22 1200   Brown - Status transduced;infusing 07/07/22 1200   Brown - Cap Change Due 07/08/22 07/07/22 1200   White - Status infusing 07/07/22 1200   White - Cap Change Due 07/08/22 07/07/22 1200   Phlebitis Scale 0-->no symptoms 07/07/22 1200   Infiltration? no 07/07/22 1200   Infiltration Scale 0 07/07/22 1200   Infiltration Site Treatment Method  None 07/07/22 0400   Was a vesicant infusing? no 07/07/22 0400   CVC Comment MAC capped 07/07/22 1200   Number of days: 9       Left Radial Interventional Procedure Access (Active)   Site Assessment Ecchymotic 07/07/22 1200   Hemostasis management Unchanged 07/07/22 1200   CMS Left Arm WDL 07/07/22 1200   Radial Pulse - Left Arm Present with doppler 07/07/22 1200   Number of days: 5     Data:     LABS    CMP:   Recent Labs   Lab 07/07/22  1141 07/07/22  0818 07/07/22  0406 07/07/22  0323 07/07/22  0004 07/07/22  0003 07/06/22 2014 07/06/22  2013 07/06/22  1600 07/06/22  1554 07/06/22  0405 07/06/22  0337 07/05/22  1214 07/05/22  1207 07/05/22  0348 07/05/22  0345 07/04/22  0327 07/04/22  0323   NA  --   --   --  136  --  134*  --  134*  --  132*   < > 132*   < > 134*  --  136  --   --    POTASSIUM  --   --   --  4.5  --  4.3  --  4.9  --  4.9   < > 5.2  5.5*   < > 5.5*   < > 5.8*  --   --    CHLORIDE  --   --   --  90*  --  90*  --  90*  --  91*   < > 98   < > 99  --  101  --   --    CO2  --   --   --  32*  --  34*  --  32*  --  29   < > 28   < > 30*  --  31*  --   --    ANIONGAP  --   --   --  14  --  10  --  12  --  12   < > 6*   < > 5*  --  4*  --   --    * 190* 157* 183*   < > 188*   < > 200*   < > 237*   < > 244*   < > 203*   < > 211*   < >  --    BUN  --   --   --  94.8*  --  93.4*  --  91.5*  --  89.0*   < > 82.7*   < > 75.4*  --  73.6*  --   --    CR  --   --   --  1.99*  --  1.98*  --  2.01*  --  2.03*   < > 1.98*   < > 2.09*  --  2.09*  --   --    GFRESTIMATED  --   --   --  28*  --  28*  --  28*   --  27*   < > 28*   < > 26*  --  26*  --   --    ESTUARDO  --   --   --  8.1*  --  8.0*  --  8.2*  --  8.1*   < > 8.0*   < > 11.9*  --  7.7*  --   --    MAG  --   --   --  2.6*  --   --   --   --   --   --   --  2.9*  --  3.0*  --  3.2*  --   --    PHOS  --   --   --  5.6*  --   --   --   --   --   --   --  3.5  --  3.3  --  3.6  --   --    PROTTOTAL  --   --   --  4.6*  --   --   --   --   --   --   --  4.4*  --   --   --   --   --  4.4*   ALBUMIN  --   --   --  2.8*  --   --   --   --   --   --   --   --   --   --   --   --   --  2.6*   BILITOTAL  --   --   --  <0.2  --   --   --   --   --   --   --  <0.2  --   --   --   --   --  <0.2   ALKPHOS  --   --   --  63  --   --   --   --   --   --   --  59  --   --   --   --   --  60   AST  --   --   --  23  --   --   --   --   --   --   --  24  --   --   --   --   --  20   ALT  --   --   --  24  --   --   --   --   --   --   --  22  --   --   --   --   --  24    < > = values in this interval not displayed.     CBC:   Recent Labs   Lab 07/07/22  0818 07/06/22  0337 07/05/22  0807 07/05/22  0345   WBC 10.6 9.2 9.5 11.4*   RBC 2.24* 2.41* 2.56* 2.67*   HGB 7.0* 7.4* 7.9* 8.2*   HCT 22.7* 25.0* 26.7* 27.6*   * 104* 104* 103*   MCH 31.3 30.7 30.9 30.7   MCHC 30.8* 29.6* 29.6* 29.7*   RDW 15.0 15.2* 15.6* 15.7*    207 190 203       INR: No lab results found in last 7 days.    Glucose:   Recent Labs   Lab 07/07/22  1141 07/07/22  0818 07/07/22  0406 07/07/22  0323 07/07/22  0004 07/07/22  0003   * 190* 157* 183* 178* 188*       Blood Gas:   Recent Labs   Lab 07/07/22  0323 07/06/22  2158 07/06/22  2018 07/05/22  1435 07/05/22  1207 07/05/22  0345 07/04/22  1944   PHV  --   --   --   --  7.20* 7.27* 7.24*   PCO2V  --   --   --   --  81* 74* 77*   PO2V  --   --   --   --  47 41 41   HCO3V  --   --   --   --  32* 34* 33*   LINDY  --   --   --   --  3.1* 6.2* 4.5*   O2PER 25 40 30   < > 4 25 25    < > = values in this interval not displayed.       Culture Data No  results for input(s): CULT in the last 168 hours.    Virology Data:   Lab Results   Component Value Date    FLUAH1 Not Detected 06/29/2022    FLUAH3 Not Detected 06/29/2022    GB1231 Not Detected 06/29/2022    IFLUB Not Detected 06/29/2022    RSVA Not Detected 06/29/2022    RSVB Not Detected 06/29/2022    PIV1 Not Detected 06/29/2022    PIV2 Not Detected 06/29/2022    PIV3 Not Detected 06/29/2022    HMPV Not Detected 06/29/2022       Historical CMV results (last 3 of prior testing):  No results found for: CMVQNT  No results found for: CMVLOG    Urine Studies    Recent Labs   Lab Test 07/05/22  1004 06/28/22  1309   URINEPH 5.5 5.0   NITRITE Negative Negative   LEUKEST Negative Negative   WBCU 5 1       Most Recent [facility] Pulmonary Function Testing (FVC/FEV1 only)  FVC-Pre   Date Value Ref Range Status   04/29/2022 1.82 L    11/11/2021 2.17 L    06/14/2021 2.00 L      FVC-%Pred-Pre   Date Value Ref Range Status   04/29/2022 58 %    11/11/2021 70 %    06/14/2021 64 %      FEV1-Pre   Date Value Ref Range Status   04/29/2022 0.51 L    11/11/2021 0.53 L    06/14/2021 0.54 L      FEV1-%Pred-Pre   Date Value Ref Range Status   04/29/2022 20 %    11/11/2021 21 %    06/14/2021 21 %        IMAGING    Recent Results (from the past 48 hour(s))   XR Chest Port 1 View    Narrative    EXAM: XR CHEST PORT 1 VIEW  7/6/2022 12:44 AM     HISTORY:  Post-Op Lung       COMPARISON:  7/5/2022    FINDINGS  Technique: Semiupright AP view of the chest.     Devices: Right internal jugular approach central venous catheter  terminates over the mid SVC. Bilateral apical and basal chest tubes  are not appreciably changed. Clamshell sternotomy wires.    Unchanged streaky perihilar and bibasilar pulmonary opacities. Cardiac  silhouette is stable in size. Aortic arch calcifications. Remaining  trace right apical pneumothorax. No pleural effusion.       Impression    IMPRESSION:     1. Trace remaining right apical pneumothorax with bilateral  chest  tubes in place.  2. Support devices stable.  3. Unchanged bilateral perihilar/basilar atelectasis/edema.    I have personally reviewed the examination and initial interpretation  and I agree with the findings.    GABRIELLA SNELL MD         SYSTEM ID:  [id number]   XR Chest Port 1 View    Narrative    Portable chest 2022 at 1306 hours    INDICATION: Post chest tube removal    COMPARISON: 0040 hours earlier today    FINDINGS: Interim bilateral thoracostomy tube removal. Clamshell  sternotomy from prior bilateral lung transplantation again present. No  conspicuous pneumothorax upon chest tube removal on either side. Right  basilar thoracostomy tube remains. Feeding tube beyond the inferior  margin of the image.      Impression    IMPRESSION: No conspicuous pneumothorax. Prior bilateral lung  transplant.    ALVINA HARRY MD         SYSTEM ID:  [id number]   XR Chest Port 1 View    Narrative    Exam: XR CHEST PORT 1 VIEW, 2022 5:47 AM    Indication: Post-Op Lung    Comparison: 2022    Findings:   Right IJ sheath over the SVC/innominate confluence. Feeding tube  courses below diaphragm with tip excluded from field-of-view.  Unchanged chest tubes and mediastinal drain.    Unchanged cardiac silhouette. Unchanged blunting of the left  hemidiaphragm. Unchanged small right hydropneumothorax. No new focal  pulmonary opacities.      Impression    Impression: Bilateral lung transplants with unchanged small right  hydropneumothorax.    I have personally reviewed the examination and initial interpretation  and I agree with the findings.    JOHANN MORAES MD         SYSTEM ID:  [id number]   Echo Limited   Result Value    LVEF  55-60%    Narrative    [id number]  [id number]  [id number]  [id number]^MARIO ALBERTO^DIP^M     [facility],[place]  Echocardiography Laboratory  [address] [place] [id number]     Name: ALMAS CASIANO  MRN: [id number]  : 1960  Study Date: 2022 12:28 PM  Age:  62 yrs  Gender: Female  Patient Location: [facility] [place]  Reason For Study: Murmur  Ordering Physician: HERNAN LLANES  Performed By: Bonifacio Lin RDCS     BSA: 1.8 m2  Height: 62 in  Weight: 164 lb  HR: 86  BP: 115/54 mmHg  ______________________________________________________________________________  Procedure  Limited Portable Echo Adult.  ______________________________________________________________________________  Interpretation Summary     Global and regional left ventricular function is normal with an EF of 55-60%.  Global right ventricular function is normal.The right ventricle is normal  size.  No significant valvular abnormalities.  IVC diameter and respiratory changes fall into an intermediate range  suggesting an RA pressure of 8 mmHg.  No pericardial effusion is present.  This study was compared with the study from 7/3/22 there has been no change .  ______________________________________________________________________________  Left Ventricle  Left ventricular size is normal. Left ventricular wall thickness is normal.  Global and regional left ventricular function is normal with an EF of 55-60%.  No regional wall motion abnormalities are seen.     Right Ventricle  The right ventricle is normal size. Global right ventricular function is  normal.     Atria  Both atria appear normal.     Mitral Valve  The mitral valve is normal. Trace mitral insufficiency is present.     Aortic Valve  The aortic valve is tricuspid.     Tricuspid Valve  The peak velocity of the tricuspid regurgitant jet is not obtainable.  Pulmonary artery systolic pressure cannot be assessed.     Pulmonic Valve  The valve leaflets are not well visualized. On Doppler interrogation, there is  no significant stenosis or regurgitation.     Vessels  IVC diameter and respiratory changes fall into an intermediate range  suggesting an RA pressure of 8 mmHg.     Pericardium  No pericardial effusion is present.     Compared to Previous Study  This study was  compared with the study from 7/3/22 there has been no change .  ______________________________________________________________________________  MMode/2D Measurements & Calculations     IVSd: 1.2 cm  LVIDd: 4.4 cm  LVIDs: 2.3 cm  LVPWd: 1.2 cm  FS: 49.2 %  LV mass(C)d: 194.5 grams  LV mass(C)dI: 110.7 grams/m2  LA dimension: 4.0 cm  RWT: 0.55     ______________________________________________________________________________  Report approved by: Martínez Tanner 07/07/2022 01:33 PM         CT Chest w/o Contrast    Narrative    CT chest without contrast    Indication evaluate fluid collection. Recent one transplant 6/28    COMPARISON: Most recent available chest CT 11/11/2021    FINDINGS: Catheter from right side approach at the junction of the  right and left innominate veins at the upper margin of the SVC. Tubing  in the right axilla. Tube into the stomach progressing beyond the  inferior margin of the image. Trace right pleural effusion. Small left  pleural effusion. Cardiomegaly. No pericardial effusion. Small  mediastinal lymph nodes which may be reactive. Pericardial drain. Main  pulmonary artery mildly enlarged at 3.8 cm. Other tubing in the  abdomen which may be in a loop of bowel. Right thoracostomy tube.  Small amount of perihepatic ascites.  Bones show clamshell sternotomy from prior bilateral lung transplant.  Mild degenerative changes in the thoracic spine. There is contrast in  the stomach.  Right hydropneumothorax associated with the effusion. Chest wall  subcutaneous emphysema. Retrosternal air. There is some narrowing of  the left sided anastomosis. Multiple anterior chest wall subcutaneous  emphysema. Air collection right chest wall anteriorly associated with  the pleura on the right. Please correlate for any sign of  bronchopleural fistula formation. Loculated fluid and air in the left  major fissure. No dominant pulmonary nodule. Other small  hydropneumothorax on the left.       Impression    IMPRESSION: Bilateral small hydropneumothoraces. Prior bilateral lung  transplant. Right chest wall abutting the right pleura, please  correlate for any evidence of bronchopleural fistula formation versus  recent postsurgical air. Enlarged pulmonary artery. Multiple support  devices. Cardiomegaly.    ALVINA HARRY MD         SYSTEM ID:  [id number]

## 2022-07-07 NOTE — PROGRESS NOTES
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 Sunshine. This was complicated by left upper extremity acute limb ischemia s/p left radial thrombectomy on 7/1/22. She remains critically ill requiring ventilatory and hemodynamic support.      PLAN SUMMARY:  - Immunosuppression and prophylaxis per pulmonary transplant team  - CT Chest and Head w/o contrast (r/o intracranial pathology that may lead to respiratory depression and evaluate left lower lobe opacities on x-ray)  - Trend ABG's  - Wean off BiPAP (ongoing respiratory acidosis) as able; HFNC if able  - Bumex 5 x 1; follow-up with nephrology re: recs; BACILIO seems to be improving with robust diuretic response   - Wean off pressors as able; continues to require NE  - Hydrocortisone stress dose + fludrocortisone given concern for adrenal insufficiency and subsequent vasoplegia  X 1 day  - Ceftazidime (2-week course)    PLAN:  Neuro/ pain/ sedation:  Acute postoperative pain  - Monitor neurological status. Notify the MD for any acute changes in exam.  - Scheduled: acetaminophen, robaxin. PRN: oxycodone, dilaudid     Pulmonary:  #Postoperative ventilatory support  #Bilateral Lung Transplant  #Hx COPD  - Titrate FiO2 for SpO2 >92%  - Pulmonary hygiene: Incentive spirometer every 15- 30 minutes when awake  - Basiliximab POD #0 and #4, prednisolone, mycophenolate, tacrolimus  - Valganciclovir       Cardiovascular:  #Hx HTN  #Hx HFpEF  - Monitor hemodynamic status.   - Goal MAP >65  - Norepinephrine drip, wean as tolerated     GI care/ Nutrition:   - NJ TF @ goal  - PPI: protonix  - Continue bowel regimen: miralax; PRN moM    Renal/ Fluid Balance/ Electrolytes:   #BACILIO  - Strict I/O, daily weights  - Avoid/limit nephrotoxins as able  - Replete lytes PRN per  protocol     Endocrine:    Stress induced hyperglycemia  - High sliding scale insulin 7/2  - Lantus 35 units daily     ID/ Antibiotics:  Stress-induced leukocytosis  - Continue to monitor fever curve, WBC and inflammatory markers as appropriate  - Prophylaxis: POD #8-90 nystatin, TMP/SMX (discontined due to BACILIO), valganciclovir  - Post-op abx: ceftazadime extended to 14 day coverage given elevated temp and WBCs  - Post-op cultures:   - Gram stain (1+ G+ cocci)   - Resp aerobic cx (4+ G- bacilli)   - Fungal cx (NGTD)   - AFB cx (NGTD)  - Blood cx sent 6/30     Heme:     Acute blood loss anemia  Acute blood loss thrombocytopenia  Left upper extremity acute limb ischemia s/p left radial thrombectomy on 7/1/22  - Transitioned from heparin gtt to SQH per vascular surgery  - Daily CBC        Prophylaxis:    - DVT: mechanical +SQH  - PPI   - PT/OT     Lines/ tubes/ drains:  - CTs x5, RIJ, montgomery, PIV x2, a-line  - 4 Pleural, 1 med     Disposition:  - [facility]    Patient seen, findings and plan discussed with CV ICU staff, Dr. Bolden.    Danuta Chairez MD  Anesthesiology Resident, PGY-4        ====================================    SUBJECTIVE:   naeo    OBJECTIVE:   1. VITAL SIGNS:   Temp:  [97.4  F (36.3  C)-98.1  F (36.7  C)] 97.4  F (36.3  C)  Pulse:  [] 80  Resp:  [13-36] 24  MAP:  [56 mmHg-99 mmHg] 66 mmHg  Arterial Line BP: (101-169)/(32-60) 112/44  FiO2 (%):  [21 %-30 %] 25 %  SpO2:  [90 %-100 %] 99 %  FiO2 (%): 25 %  Resp: 24      2. INTAKE/ OUTPUT:   I/O last 3 completed shifts:  In: 2155.26 [I.V.:435.26; NG/GT:880]  Out: 2900 [Urine:1880; Chest Tube:1020]    3. PHYSICAL EXAMINATION:   General: up in chair; follows commands  Neuro: grossly intact  Resp: on BiPAP  CV: RRR  Abdomen: Soft, Non-distended, Non-tender  Incisions: c/d/i  Extremities: warm and well perfused    4. INVESTIGATIONS:   Arterial Blood Gases   Recent Labs   Lab 07/07/22  0323 07/06/22  2158 07/06/22 2018 07/06/22  1026   PH 7.30* 7.27*  7.25* 7.24*   PCO2 75* 78* 80* 76*   PO2 75* 152* 113* 166*   HCO3 37* 36* 35* 32*     Complete Blood Count   Recent Labs   Lab 07/07/22  0818 07/06/22  0337 07/05/22  0807 07/05/22  0345   WBC 10.6 9.2 9.5 11.4*   HGB 7.0* 7.4* 7.9* 8.2*    207 190 203     Basic Metabolic Panel  Recent Labs   Lab 07/07/22  0818 07/07/22  0406 07/07/22  0323 07/07/22  0004 07/07/22  0003 07/06/22 2014 07/06/22 2013 07/06/22  1600 07/06/22  1554   NA  --   --  136  --  134*  --  134*  --  132*   POTASSIUM  --   --  4.5  --  4.3  --  4.9  --  4.9   CHLORIDE  --   --  90*  --  90*  --  90*  --  91*   CO2  --   --  32*  --  34*  --  32*  --  29   BUN  --   --  94.8*  --  93.4*  --  91.5*  --  89.0*   CR  --   --  1.99*  --  1.98*  --  2.01*  --  2.03*   * 157* 183* 178* 188*   < > 200*   < > 237*    < > = values in this interval not displayed.     Liver Function Tests  Recent Labs   Lab 07/07/22  0323 07/06/22  0337 07/04/22  0323   AST 23 24 20   ALT 24 22 24   ALKPHOS 63 59 60   BILITOTAL <0.2 <0.2 <0.2   ALBUMIN 2.8*  --  2.6*     Pancreatic Enzymes  No lab results found in last 7 days.  Coagulation Profile  No lab results found in last 7 days.      5. RADIOLOGY:   Recent Results (from the past 24 hour(s))   XR Chest Port 1 View    Narrative    Portable chest 7/6/2022 at 1306 hours    INDICATION: Post chest tube removal    COMPARISON: 0040 hours earlier today    FINDINGS: Interim bilateral thoracostomy tube removal. Clamshell  sternotomy from prior bilateral lung transplantation again present. No  conspicuous pneumothorax upon chest tube removal on either side. Right  basilar thoracostomy tube remains. Feeding tube beyond the inferior  margin of the image.      Impression    IMPRESSION: No conspicuous pneumothorax. Prior bilateral lung  transplant.    ALVINA HARRY MD         SYSTEM ID:  [id number]   XR Chest Port 1 View    Narrative    Exam: XR CHEST PORT 1 VIEW, 7/7/2022 5:47 AM    Indication: Post-Op  Lung    Comparison: 7/6/2022    Findings:   Right IJ sheath over the SVC/innominate confluence. Feeding tube  courses below diaphragm with tip excluded from field-of-view.  Unchanged chest tubes and mediastinal drain.    Unchanged cardiac silhouette. Unchanged blunting of the left  hemidiaphragm. Unchanged small right hydropneumothorax. No new focal  pulmonary opacities.      Impression    Impression: Bilateral lung transplants with unchanged small right  hydropneumothorax.    I have personally reviewed the examination and initial interpretation  and I agree with the findings.    JOHANN MORAES MD         SYSTEM ID:  [id number]       =========================================

## 2022-07-08 ENCOUNTER — APPOINTMENT (OUTPATIENT)
Dept: OCCUPATIONAL THERAPY | Facility: CLINIC | Age: 62
DRG: 007 | End: 2022-07-08
Attending: THORACIC SURGERY (CARDIOTHORACIC VASCULAR SURGERY)
Payer: MEDICARE

## 2022-07-08 ENCOUNTER — APPOINTMENT (OUTPATIENT)
Dept: ULTRASOUND IMAGING | Facility: CLINIC | Age: 62
DRG: 007 | End: 2022-07-08
Attending: STUDENT IN AN ORGANIZED HEALTH CARE EDUCATION/TRAINING PROGRAM
Payer: MEDICARE

## 2022-07-08 ENCOUNTER — APPOINTMENT (OUTPATIENT)
Dept: GENERAL RADIOLOGY | Facility: CLINIC | Age: 62
DRG: 007 | End: 2022-07-08
Attending: STUDENT IN AN ORGANIZED HEALTH CARE EDUCATION/TRAINING PROGRAM
Payer: MEDICARE

## 2022-07-08 ENCOUNTER — APPOINTMENT (OUTPATIENT)
Dept: GENERAL RADIOLOGY | Facility: CLINIC | Age: 62
DRG: 007 | End: 2022-07-08
Attending: THORACIC SURGERY (CARDIOTHORACIC VASCULAR SURGERY)
Payer: MEDICARE

## 2022-07-08 ENCOUNTER — APPOINTMENT (OUTPATIENT)
Dept: SPEECH THERAPY | Facility: CLINIC | Age: 62
DRG: 007 | End: 2022-07-08
Attending: THORACIC SURGERY (CARDIOTHORACIC VASCULAR SURGERY)
Payer: MEDICARE

## 2022-07-08 LAB
ALBUMIN UR-MCNC: 30 MG/DL
ANION GAP SERPL CALCULATED.3IONS-SCNC: 6 MMOL/L (ref 7–15)
ANION GAP SERPL CALCULATED.3IONS-SCNC: 7 MMOL/L (ref 7–15)
APPEARANCE UR: CLEAR
BASE EXCESS BLDA CALC-SCNC: 12.3 MMOL/L (ref -9–1.8)
BASE EXCESS BLDA CALC-SCNC: 12.7 MMOL/L (ref -9–1.8)
BASE EXCESS BLDA CALC-SCNC: 14.5 MMOL/L (ref -9–1.8)
BASOPHILS # BLD AUTO: 0 10E3/UL (ref 0–0.2)
BASOPHILS NFR BLD AUTO: 0 %
BILIRUB UR QL STRIP: NEGATIVE
BUN SERPL-MCNC: 105 MG/DL (ref 8–23)
BUN SERPL-MCNC: 97 MG/DL (ref 8–23)
CALCIUM SERPL-MCNC: 8.4 MG/DL (ref 8.8–10.2)
CALCIUM SERPL-MCNC: 8.6 MG/DL (ref 8.8–10.2)
CHLORIDE SERPL-SCNC: 94 MMOL/L (ref 98–107)
CHLORIDE SERPL-SCNC: 94 MMOL/L (ref 98–107)
COLOR UR AUTO: YELLOW
CREAT SERPL-MCNC: 1.77 MG/DL (ref 0.51–0.95)
CREAT SERPL-MCNC: 1.92 MG/DL (ref 0.51–0.95)
DEPRECATED HCO3 PLAS-SCNC: 39 MMOL/L (ref 22–29)
DEPRECATED HCO3 PLAS-SCNC: 42 MMOL/L (ref 22–29)
EOSINOPHIL # BLD AUTO: 0 10E3/UL (ref 0–0.7)
EOSINOPHIL NFR BLD AUTO: 0 %
ERYTHROCYTE [DISTWIDTH] IN BLOOD BY AUTOMATED COUNT: 16.1 % (ref 10–15)
ERYTHROCYTE [DISTWIDTH] IN BLOOD BY AUTOMATED COUNT: 16.4 % (ref 10–15)
ERYTHROCYTE [DISTWIDTH] IN BLOOD BY AUTOMATED COUNT: 16.6 % (ref 10–15)
GFR SERPL CREATININE-BSD FRML MDRD: 29 ML/MIN/1.73M2
GFR SERPL CREATININE-BSD FRML MDRD: 32 ML/MIN/1.73M2
GLUCOSE BLDC GLUCOMTR-MCNC: 114 MG/DL (ref 70–99)
GLUCOSE BLDC GLUCOMTR-MCNC: 116 MG/DL (ref 70–99)
GLUCOSE BLDC GLUCOMTR-MCNC: 152 MG/DL (ref 70–99)
GLUCOSE BLDC GLUCOMTR-MCNC: 153 MG/DL (ref 70–99)
GLUCOSE BLDC GLUCOMTR-MCNC: 157 MG/DL (ref 70–99)
GLUCOSE BLDC GLUCOMTR-MCNC: 161 MG/DL (ref 70–99)
GLUCOSE SERPL-MCNC: 132 MG/DL (ref 70–99)
GLUCOSE SERPL-MCNC: 168 MG/DL (ref 70–99)
GLUCOSE UR STRIP-MCNC: NEGATIVE MG/DL
HCO3 BLD-SCNC: 41 MMOL/L (ref 21–28)
HCO3 BLD-SCNC: 42 MMOL/L (ref 21–28)
HCO3 BLD-SCNC: 43 MMOL/L (ref 21–28)
HCT VFR BLD AUTO: 26.8 % (ref 35–47)
HCT VFR BLD AUTO: 27.2 % (ref 35–47)
HCT VFR BLD AUTO: 27.2 % (ref 35–47)
HGB BLD-MCNC: 8.3 G/DL (ref 11.7–15.7)
HGB BLD-MCNC: 8.3 G/DL (ref 11.7–15.7)
HGB BLD-MCNC: 8.5 G/DL (ref 11.7–15.7)
HGB UR QL STRIP: ABNORMAL
IMM GRANULOCYTES # BLD: 0.2 10E3/UL
IMM GRANULOCYTES NFR BLD: 2 %
KETONES UR STRIP-MCNC: NEGATIVE MG/DL
LACTATE SERPL-SCNC: 0.7 MMOL/L (ref 0.7–2)
LEUKOCYTE ESTERASE UR QL STRIP: NEGATIVE
LIPASE SERPL-CCNC: 17 U/L (ref 13–60)
LYMPHOCYTES # BLD AUTO: 0.4 10E3/UL (ref 0.8–5.3)
LYMPHOCYTES NFR BLD AUTO: 3 %
MAGNESIUM SERPL-MCNC: 2.5 MG/DL (ref 1.7–2.3)
MCH RBC QN AUTO: 30.7 PG (ref 26.5–33)
MCH RBC QN AUTO: 30.7 PG (ref 26.5–33)
MCH RBC QN AUTO: 30.9 PG (ref 26.5–33)
MCHC RBC AUTO-ENTMCNC: 30.5 G/DL (ref 31.5–36.5)
MCHC RBC AUTO-ENTMCNC: 31 G/DL (ref 31.5–36.5)
MCHC RBC AUTO-ENTMCNC: 31.3 G/DL (ref 31.5–36.5)
MCV RBC AUTO: 101 FL (ref 78–100)
MCV RBC AUTO: 99 FL (ref 78–100)
MCV RBC AUTO: 99 FL (ref 78–100)
MONOCYTES # BLD AUTO: 0.9 10E3/UL (ref 0–1.3)
MONOCYTES NFR BLD AUTO: 7 %
MUCOUS THREADS #/AREA URNS LPF: PRESENT /LPF
NEUTROPHILS # BLD AUTO: 12.7 10E3/UL (ref 1.6–8.3)
NEUTROPHILS NFR BLD AUTO: 88 %
NITRATE UR QL: NEGATIVE
NRBC # BLD AUTO: 0.1 10E3/UL
NRBC BLD AUTO-RTO: 1 /100
O2/TOTAL GAS SETTING VFR VENT: 1 %
O2/TOTAL GAS SETTING VFR VENT: 1 %
O2/TOTAL GAS SETTING VFR VENT: 21 %
PCO2 BLD: 82 MM HG (ref 35–45)
PCO2 BLD: 86 MM HG (ref 35–45)
PCO2 BLD: 96 MM HG (ref 35–45)
PH BLD: 7.25 [PH] (ref 7.35–7.45)
PH BLD: 7.29 [PH] (ref 7.35–7.45)
PH BLD: 7.33 [PH] (ref 7.35–7.45)
PH UR STRIP: 5.5 [PH] (ref 5–7)
PHOSPHATE SERPL-MCNC: 4.6 MG/DL (ref 2.5–4.5)
PLATELET # BLD AUTO: 292 10E3/UL (ref 150–450)
PLATELET # BLD AUTO: 305 10E3/UL (ref 150–450)
PLATELET # BLD AUTO: 310 10E3/UL (ref 150–450)
PO2 BLD: 114 MM HG (ref 80–105)
PO2 BLD: 131 MM HG (ref 80–105)
PO2 BLD: 133 MM HG (ref 80–105)
POTASSIUM SERPL-SCNC: 3.4 MMOL/L (ref 3.4–5.3)
POTASSIUM SERPL-SCNC: 4 MMOL/L (ref 3.4–5.3)
POTASSIUM SERPL-SCNC: 4.2 MMOL/L (ref 3.4–5.3)
RBC # BLD AUTO: 2.7 10E6/UL (ref 3.8–5.2)
RBC # BLD AUTO: 2.7 10E6/UL (ref 3.8–5.2)
RBC # BLD AUTO: 2.75 10E6/UL (ref 3.8–5.2)
RBC URINE: <1 /HPF
SODIUM SERPL-SCNC: 140 MMOL/L (ref 136–145)
SODIUM SERPL-SCNC: 142 MMOL/L (ref 136–145)
SP GR UR STRIP: 1.02 (ref 1–1.03)
TACROLIMUS BLD-MCNC: 5.1 UG/L (ref 5–15)
TME LAST DOSE: NORMAL H
TME LAST DOSE: NORMAL H
UROBILINOGEN UR STRIP-MCNC: NORMAL MG/DL
WBC # BLD AUTO: 14.3 10E3/UL (ref 4–11)
WBC # BLD AUTO: 16.4 10E3/UL (ref 4–11)
WBC # BLD AUTO: 17.5 10E3/UL (ref 4–11)
WBC URINE: 1 /HPF

## 2022-07-08 PROCEDURE — 250N000013 HC RX MED GY IP 250 OP 250 PS 637

## 2022-07-08 PROCEDURE — 250N000011 HC RX IP 250 OP 636: Performed by: STUDENT IN AN ORGANIZED HEALTH CARE EDUCATION/TRAINING PROGRAM

## 2022-07-08 PROCEDURE — 80197 ASSAY OF TACROLIMUS: CPT | Performed by: INTERNAL MEDICINE

## 2022-07-08 PROCEDURE — 82310 ASSAY OF CALCIUM: CPT | Performed by: STUDENT IN AN ORGANIZED HEALTH CARE EDUCATION/TRAINING PROGRAM

## 2022-07-08 PROCEDURE — 250N000013 HC RX MED GY IP 250 OP 250 PS 637: Performed by: SURGERY

## 2022-07-08 PROCEDURE — 250N000013 HC RX MED GY IP 250 OP 250 PS 637: Performed by: STUDENT IN AN ORGANIZED HEALTH CARE EDUCATION/TRAINING PROGRAM

## 2022-07-08 PROCEDURE — 93931 UPPER EXTREMITY STUDY: CPT | Mod: LT

## 2022-07-08 PROCEDURE — 250N000011 HC RX IP 250 OP 636: Performed by: INTERNAL MEDICINE

## 2022-07-08 PROCEDURE — 250N000012 HC RX MED GY IP 250 OP 636 PS 637: Performed by: SURGERY

## 2022-07-08 PROCEDURE — 99232 SBSQ HOSP IP/OBS MODERATE 35: CPT | Mod: 24 | Performed by: INTERNAL MEDICINE

## 2022-07-08 PROCEDURE — 85025 COMPLETE CBC W/AUTO DIFF WBC: CPT | Performed by: SURGERY

## 2022-07-08 PROCEDURE — 250N000011 HC RX IP 250 OP 636: Performed by: SURGERY

## 2022-07-08 PROCEDURE — 85027 COMPLETE CBC AUTOMATED: CPT | Performed by: STUDENT IN AN ORGANIZED HEALTH CARE EDUCATION/TRAINING PROGRAM

## 2022-07-08 PROCEDURE — 94640 AIRWAY INHALATION TREATMENT: CPT

## 2022-07-08 PROCEDURE — 93971 EXTREMITY STUDY: CPT | Mod: 26 | Performed by: RADIOLOGY

## 2022-07-08 PROCEDURE — 200N000002 HC R&B ICU UMMC

## 2022-07-08 PROCEDURE — 81001 URINALYSIS AUTO W/SCOPE: CPT | Performed by: STUDENT IN AN ORGANIZED HEALTH CARE EDUCATION/TRAINING PROGRAM

## 2022-07-08 PROCEDURE — 71045 X-RAY EXAM CHEST 1 VIEW: CPT | Mod: 26 | Performed by: RADIOLOGY

## 2022-07-08 PROCEDURE — 74018 RADEX ABDOMEN 1 VIEW: CPT

## 2022-07-08 PROCEDURE — 71045 X-RAY EXAM CHEST 1 VIEW: CPT

## 2022-07-08 PROCEDURE — 82803 BLOOD GASES ANY COMBINATION: CPT | Performed by: STUDENT IN AN ORGANIZED HEALTH CARE EDUCATION/TRAINING PROGRAM

## 2022-07-08 PROCEDURE — 94668 MNPJ CHEST WALL SBSQ: CPT

## 2022-07-08 PROCEDURE — 93931 UPPER EXTREMITY STUDY: CPT | Mod: 26 | Performed by: RADIOLOGY

## 2022-07-08 PROCEDURE — 84132 ASSAY OF SERUM POTASSIUM: CPT | Performed by: STUDENT IN AN ORGANIZED HEALTH CARE EDUCATION/TRAINING PROGRAM

## 2022-07-08 PROCEDURE — 250N000012 HC RX MED GY IP 250 OP 636 PS 637: Performed by: INTERNAL MEDICINE

## 2022-07-08 PROCEDURE — 250N000011 HC RX IP 250 OP 636: Performed by: THORACIC SURGERY (CARDIOTHORACIC VASCULAR SURGERY)

## 2022-07-08 PROCEDURE — 250N000011 HC RX IP 250 OP 636

## 2022-07-08 PROCEDURE — 94640 AIRWAY INHALATION TREATMENT: CPT | Mod: 76

## 2022-07-08 PROCEDURE — 83605 ASSAY OF LACTIC ACID: CPT | Performed by: ANESTHESIOLOGY

## 2022-07-08 PROCEDURE — 250N000009 HC RX 250: Performed by: SURGERY

## 2022-07-08 PROCEDURE — 83735 ASSAY OF MAGNESIUM: CPT | Performed by: SURGERY

## 2022-07-08 PROCEDURE — 92526 ORAL FUNCTION THERAPY: CPT | Mod: GN

## 2022-07-08 PROCEDURE — 74018 RADEX ABDOMEN 1 VIEW: CPT | Mod: 26 | Performed by: RADIOLOGY

## 2022-07-08 PROCEDURE — 84100 ASSAY OF PHOSPHORUS: CPT | Performed by: SURGERY

## 2022-07-08 PROCEDURE — 999N000157 HC STATISTIC RCP TIME EA 10 MIN

## 2022-07-08 PROCEDURE — 258N000003 HC RX IP 258 OP 636: Performed by: THORACIC SURGERY (CARDIOTHORACIC VASCULAR SURGERY)

## 2022-07-08 PROCEDURE — 93971 EXTREMITY STUDY: CPT | Mod: LT

## 2022-07-08 PROCEDURE — 85027 COMPLETE CBC AUTOMATED: CPT | Performed by: SURGERY

## 2022-07-08 PROCEDURE — 99291 CRITICAL CARE FIRST HOUR: CPT | Mod: 24 | Performed by: STUDENT IN AN ORGANIZED HEALTH CARE EDUCATION/TRAINING PROGRAM

## 2022-07-08 PROCEDURE — 97530 THERAPEUTIC ACTIVITIES: CPT | Mod: GO

## 2022-07-08 PROCEDURE — 83690 ASSAY OF LIPASE: CPT | Performed by: STUDENT IN AN ORGANIZED HEALTH CARE EDUCATION/TRAINING PROGRAM

## 2022-07-08 PROCEDURE — 99233 SBSQ HOSP IP/OBS HIGH 50: CPT | Mod: 24 | Performed by: INTERNAL MEDICINE

## 2022-07-08 RX ORDER — MIDODRINE HYDROCHLORIDE 5 MG/1
30 TABLET ORAL EVERY 8 HOURS
Status: DISCONTINUED | OUTPATIENT
Start: 2022-07-08 | End: 2022-07-12

## 2022-07-08 RX ORDER — POTASSIUM CHLORIDE 29.8 MG/ML
20 INJECTION INTRAVENOUS ONCE
Status: COMPLETED | OUTPATIENT
Start: 2022-07-08 | End: 2022-07-08

## 2022-07-08 RX ORDER — NYSTATIN 100000 U/G
CREAM TOPICAL 2 TIMES DAILY
Status: DISCONTINUED | OUTPATIENT
Start: 2022-07-08 | End: 2022-08-17 | Stop reason: HOSPADM

## 2022-07-08 RX ORDER — BUMETANIDE 0.25 MG/ML
5 INJECTION INTRAMUSCULAR; INTRAVENOUS ONCE
Status: DISCONTINUED | OUTPATIENT
Start: 2022-07-08 | End: 2022-07-08

## 2022-07-08 RX ORDER — BUMETANIDE 0.25 MG/ML
5 INJECTION INTRAMUSCULAR; INTRAVENOUS ONCE
Status: COMPLETED | OUTPATIENT
Start: 2022-07-08 | End: 2022-07-08

## 2022-07-08 RX ADMIN — HEPARIN SODIUM 5000 UNITS: 5000 INJECTION, SOLUTION INTRAVENOUS; SUBCUTANEOUS at 05:58

## 2022-07-08 RX ADMIN — MIDODRINE HYDROCHLORIDE 20 MG: 5 TABLET ORAL at 00:25

## 2022-07-08 RX ADMIN — HYDROCORTISONE SODIUM SUCCINATE 50 MG: 100 INJECTION, POWDER, FOR SOLUTION INTRAMUSCULAR; INTRAVENOUS at 21:26

## 2022-07-08 RX ADMIN — MIDODRINE HYDROCHLORIDE 30 MG: 5 TABLET ORAL at 15:27

## 2022-07-08 RX ADMIN — NYSTATIN 1000000 UNITS: 100000 SUSPENSION ORAL at 15:28

## 2022-07-08 RX ADMIN — OXYCODONE HYDROCHLORIDE 5 MG: 5 TABLET ORAL at 10:57

## 2022-07-08 RX ADMIN — ONDANSETRON 4 MG: 2 INJECTION INTRAMUSCULAR; INTRAVENOUS at 10:57

## 2022-07-08 RX ADMIN — LIDOCAINE PATCH 4% 2 PATCH: 40 PATCH TOPICAL at 00:25

## 2022-07-08 RX ADMIN — ACETAMINOPHEN 975 MG: 325 TABLET, FILM COATED ORAL at 04:05

## 2022-07-08 RX ADMIN — FLUDROCORTISONE ACETATE 0.1 MG: 0.1 TABLET ORAL at 08:28

## 2022-07-08 RX ADMIN — NYSTATIN 1000000 UNITS: 100000 SUSPENSION ORAL at 20:20

## 2022-07-08 RX ADMIN — HYDROMORPHONE HYDROCHLORIDE 0.2 MG: 0.2 INJECTION, SOLUTION INTRAMUSCULAR; INTRAVENOUS; SUBCUTANEOUS at 08:42

## 2022-07-08 RX ADMIN — ONDANSETRON 4 MG: 2 INJECTION INTRAMUSCULAR; INTRAVENOUS at 20:14

## 2022-07-08 RX ADMIN — TACROLIMUS 9 MG: 5 CAPSULE ORAL at 17:26

## 2022-07-08 RX ADMIN — Medication 1 PACKET: at 08:30

## 2022-07-08 RX ADMIN — HYDROXYZINE HYDROCHLORIDE 50 MG: 25 TABLET ORAL at 00:25

## 2022-07-08 RX ADMIN — Medication 40 MG: at 08:27

## 2022-07-08 RX ADMIN — MIDODRINE HYDROCHLORIDE 20 MG: 5 TABLET ORAL at 08:28

## 2022-07-08 RX ADMIN — TACROLIMUS 6 MG: 5 CAPSULE ORAL at 08:29

## 2022-07-08 RX ADMIN — MYCOPHENOLATE MOFETIL 1500 MG: 200 POWDER, FOR SUSPENSION ORAL at 08:29

## 2022-07-08 RX ADMIN — HYDROCORTISONE SODIUM SUCCINATE 50 MG: 100 INJECTION, POWDER, FOR SOLUTION INTRAMUSCULAR; INTRAVENOUS at 04:06

## 2022-07-08 RX ADMIN — SENNOSIDES AND DOCUSATE SODIUM 2 TABLET: 8.6; 5 TABLET ORAL at 08:28

## 2022-07-08 RX ADMIN — HEPARIN SODIUM 5000 UNITS: 5000 INJECTION, SOLUTION INTRAVENOUS; SUBCUTANEOUS at 21:26

## 2022-07-08 RX ADMIN — CEFTAZIDIME 2 G: 2 INJECTION, POWDER, FOR SOLUTION INTRAVENOUS at 08:42

## 2022-07-08 RX ADMIN — ACETYLCYSTEINE 2 ML: 200 SOLUTION ORAL; RESPIRATORY (INHALATION) at 09:21

## 2022-07-08 RX ADMIN — INSULIN ASPART 2 UNITS: 100 INJECTION, SOLUTION INTRAVENOUS; SUBCUTANEOUS at 12:44

## 2022-07-08 RX ADMIN — SIMETHICONE 80 MG: 80 TABLET, CHEWABLE ORAL at 04:05

## 2022-07-08 RX ADMIN — ACETYLCYSTEINE 2 ML: 200 SOLUTION ORAL; RESPIRATORY (INHALATION) at 19:32

## 2022-07-08 RX ADMIN — MYCOPHENOLATE MOFETIL 1500 MG: 200 POWDER, FOR SUSPENSION ORAL at 20:20

## 2022-07-08 RX ADMIN — HEPARIN SODIUM 5000 UNITS: 5000 INJECTION, SOLUTION INTRAVENOUS; SUBCUTANEOUS at 13:54

## 2022-07-08 RX ADMIN — ASPIRIN 81 MG CHEWABLE TABLET 81 MG: 81 TABLET CHEWABLE at 08:28

## 2022-07-08 RX ADMIN — CALCIUM CARBONATE 600 MG (1,500 MG)-VITAMIN D3 400 UNIT TABLET 1 TABLET: at 17:20

## 2022-07-08 RX ADMIN — LEVALBUTEROL HYDROCHLORIDE 1.25 MG: 1.25 SOLUTION RESPIRATORY (INHALATION) at 09:21

## 2022-07-08 RX ADMIN — CALCIUM CARBONATE 600 MG (1,500 MG)-VITAMIN D3 400 UNIT TABLET 1 TABLET: at 08:28

## 2022-07-08 RX ADMIN — POLYETHYLENE GLYCOL 3350 17 G: 17 POWDER, FOR SOLUTION ORAL at 08:29

## 2022-07-08 RX ADMIN — SENNOSIDES AND DOCUSATE SODIUM 2 TABLET: 8.6; 5 TABLET ORAL at 20:21

## 2022-07-08 RX ADMIN — INSULIN ASPART 1 UNITS: 100 INJECTION, SOLUTION INTRAVENOUS; SUBCUTANEOUS at 04:06

## 2022-07-08 RX ADMIN — LEVALBUTEROL HYDROCHLORIDE 1.25 MG: 1.25 SOLUTION RESPIRATORY (INHALATION) at 16:12

## 2022-07-08 RX ADMIN — NYSTATIN 1000000 UNITS: 100000 SUSPENSION ORAL at 12:46

## 2022-07-08 RX ADMIN — THIAMINE HYDROCHLORIDE 100 MG: 100 INJECTION, SOLUTION INTRAMUSCULAR; INTRAVENOUS at 09:31

## 2022-07-08 RX ADMIN — ONDANSETRON 4 MG: 2 INJECTION INTRAMUSCULAR; INTRAVENOUS at 04:45

## 2022-07-08 RX ADMIN — MULTIVITAMIN 15 ML: LIQUID ORAL at 08:28

## 2022-07-08 RX ADMIN — VANCOMYCIN HYDROCHLORIDE 750 MG: 10 INJECTION, POWDER, LYOPHILIZED, FOR SOLUTION INTRAVENOUS at 17:20

## 2022-07-08 RX ADMIN — POTASSIUM CHLORIDE 20 MEQ: 29.8 INJECTION, SOLUTION INTRAVENOUS at 02:02

## 2022-07-08 RX ADMIN — NYSTATIN: 100000 CREAM TOPICAL at 08:30

## 2022-07-08 RX ADMIN — ACETAMINOPHEN 975 MG: 325 TABLET, FILM COATED ORAL at 20:19

## 2022-07-08 RX ADMIN — INSULIN ASPART 1 UNITS: 100 INJECTION, SOLUTION INTRAVENOUS; SUBCUTANEOUS at 20:20

## 2022-07-08 RX ADMIN — ACETAMINOPHEN 975 MG: 325 TABLET, FILM COATED ORAL at 11:00

## 2022-07-08 RX ADMIN — HYDROCORTISONE SODIUM SUCCINATE 50 MG: 100 INJECTION, POWDER, FOR SOLUTION INTRAMUSCULAR; INTRAVENOUS at 09:38

## 2022-07-08 RX ADMIN — ACETYLCYSTEINE 2 ML: 200 SOLUTION ORAL; RESPIRATORY (INHALATION) at 13:11

## 2022-07-08 RX ADMIN — NYSTATIN 1000000 UNITS: 100000 SUSPENSION ORAL at 08:30

## 2022-07-08 RX ADMIN — POLYETHYLENE GLYCOL 3350 17 G: 17 POWDER, FOR SOLUTION ORAL at 20:21

## 2022-07-08 RX ADMIN — INSULIN ASPART 1 UNITS: 100 INJECTION, SOLUTION INTRAVENOUS; SUBCUTANEOUS at 00:25

## 2022-07-08 RX ADMIN — BUMETANIDE 5 MG: 0.25 INJECTION, SOLUTION INTRAMUSCULAR; INTRAVENOUS at 09:50

## 2022-07-08 RX ADMIN — NYSTATIN: 100000 CREAM TOPICAL at 20:21

## 2022-07-08 RX ADMIN — LEVALBUTEROL HYDROCHLORIDE 1.25 MG: 1.25 SOLUTION RESPIRATORY (INHALATION) at 19:32

## 2022-07-08 RX ADMIN — HYDROCORTISONE SODIUM SUCCINATE 50 MG: 100 INJECTION, POWDER, FOR SOLUTION INTRAMUSCULAR; INTRAVENOUS at 15:27

## 2022-07-08 RX ADMIN — LEVALBUTEROL HYDROCHLORIDE 1.25 MG: 1.25 SOLUTION RESPIRATORY (INHALATION) at 13:11

## 2022-07-08 RX ADMIN — ACETYLCYSTEINE 2 ML: 200 SOLUTION ORAL; RESPIRATORY (INHALATION) at 16:12

## 2022-07-08 ASSESSMENT — ACTIVITIES OF DAILY LIVING (ADL)
ADLS_ACUITY_SCORE: 36
ADLS_ACUITY_SCORE: 32
ADLS_ACUITY_SCORE: 32
ADLS_ACUITY_SCORE: 29
ADLS_ACUITY_SCORE: 32
ADLS_ACUITY_SCORE: 36
ADLS_ACUITY_SCORE: 30
ADLS_ACUITY_SCORE: 32
ADLS_ACUITY_SCORE: 30
ADLS_ACUITY_SCORE: 32
ADLS_ACUITY_SCORE: 32
ADLS_ACUITY_SCORE: 29

## 2022-07-08 NOTE — PROGRESS NOTES
Pulmonary Medicine  Cystic Fibrosis - Lung Transplant Team  Progress Note  2022     Patient: Sofie Rodriguez  MRN: [id number]  : 1960 (age 62 year old)  Transplant: 2022 (Lung), POD #10  Admission date: 2022    Assessment & Plan:   Sofie Rodriguez is a 62 year old female with a PMH significant for end stage COPD, HTN, Hep C, and osteopenia as well as former methamphetamine use.  Pt. is now s/p BSLT on 22.  Surgery on CPB, uncomplicated, lungs slightly undersized for chest. Did require some moderate volume resuscitation with low dose pressors post transplant. Extubated . Persistent CO2 retention, with limited improvement with BiPAP. Etiology is unclear but appears to be a resp drive problem. Persistent low dose pressor requirement of unclear etiology.  Left radial artery thrombus (presumed secondary to arterial line) with thrombectomy under local anesthesia and MAC on .     Recommendations today:   - Continue Bipap for CO2 retention/acidosis at night and as needed during the day depending on VBG results  - Monitor VBGs  - Diuresis per renal recommendations  - Sputum airway cultures with stenotrophomonas    - Continue ceftaz for a total fo 14 days  - Consider adding coverage for strep pneumoniae--ceftaz unlikely to cover   -placed order for vanco  - Tacro recheck Fri and Sat (ordered), will check trough/peak levels on Monday.  - Prednisolone held while on stress dose hydrocortisone.  Reinitiate prednisolone when hydrocortisone is less than 120 mg in 24 hours.  - begin valcyte prophylaxis today   - Hold on bactrim prophylaxis  given elevated creatinine  - Unable to complete metabolic cart because of hypoxia   - study does not suggest over feeding     S/p bilateral sequential lung transplant (BSLT) for end stage COPD:  Persistent hypercapneic respiratory failure: No evidence of PGD post operatively, Extubated to 2L NC on .  Ongoing low dose pressor requirement.  Persistent CO2  retention but the patient is not very tachypneic/dyspneic so it appears to be a drive issue.   - 7/8 chest x-ray reviewed by me, clear lung fields with NJ/CVC and chest tubes in place.  - CO2 retention but oxygenating well and no evidence of resp distress. Likely reflects preop/chronic CO2 retention. May take some time to re-equilibrate. BiPAP at night and through the day as needed depending on VBG.  Minimize sedation. Reduce supplemental O2 to keep SaO2 93-96%. Monitor VBGs  - RQ study performed, it was done for only 6mins but data was not supportive of Overfeeding.  - Diaphragm assessment by US does not show dysfunction per CVICU  - Nebs: levalbuterol and Mucomyst QID   - Pulmonary toilet with chest physiotherapy QID (addition of Aerobika and incentive spirometry once extubated)  - DSA on 7/4 Neg) then one month post-transplant, repeat in one month.  - Ammonia monitoring every twice weekly  x 3 weeks (screening for hyperammonemia post-lung transplant). Ammonia 31 (7/7)  - Bronch 6/29 with patent airways, no significant ischemic reperfusion injury, no significant edema, occasional mucous plugs in lower lobes bilaterally but removed upon therapeutic suctioning.   - PVTS catheters placed 6/29 but removed on 7/2 due to the requirement for heparinization for radial artery thrombectomy.  - Chest tubes managed by surgical team  - Daily CXR  - Post-pyloric feeding tube placed by GI   --tube feeds at goal  - Explant pathology with end stage emphysema as expected and all lymph nodes benign.      Immunosuppression:  Induction therapy with basiliximab (and high dose IV steroid) given intraoperatively, repeating basiliximab dose on POD#4.  - Tacrolimus goal tacrolimus level 8-12.   Date Tacro Level Intervention   6/29 <1.0 Continue current dose, 1 mg bid   6/30 3.0 Increase to 2 mg bid   7/1 6.9 No adjustment    7/2 9.1  continue current dose    7/3  8.6  switch to tacrolimus by feeding tube 4 mg twice daily starting tomorrow  (7/4) morning   7/4 10.2 Switched to enteral today. Not steady state. Continue current dose.   7/5  7.7 Not steady state, continue current dose.   7/6 4.8   increased to 6 mg twice daily.                                   - MMF 1500 mg BID  - Methylprednisolone 125 mg x 3 doses completed. Prednisone held while on stress dose hydrocortisone.  Reinitiate prednisolone when hydrocortisone is less than 120 mg in 24 hours.  Date AM dose (mg) PM dose (mg)   7/7 15 15   7/14 15 12.5   7/21 12.5 12.5   8/4 12.5 10   8/18 10 10   9/15 10 7.5   10/13 7.5 7.5   11/10 7.5 5   12/8 5 5   1/5/23 5 2.5      Prophylaxis:   - Bactrim for PJP ppx, begin 7/7, every other day for elevated creatinine  - VGCV for CMV ppx (as below to start on 7/7)  - Nystatin for oral candidiasis ppx, 6 month course  - See below for serologies and viral ppx:    Donor Recipient Intervention   CMV status + + Valganciclovir POD #8-90   EBV status + + EBV check monthly   HSV status N/A + No Acyclovir prophylaxis      Hemodynamics: Persistent hypotension/pressor requirements.  Etiology unclear. - Echocardiogram (7/7/22) with normal ejection fraction. No valvular disease  Started fludrocortisone for hypotension and hyperkalemia 7/6  Trial of stress dose steroids starting 7/6  CT head 7/7 without intracranial pathology to explain hypotension.    BACILIO: Likely multifactorial including bactrim and hypotension. Unlikely that tacro has been playing a significant role since levels have generally been low.  Volume status is unclear.  Hypotension and rising creatinine suggests she is volume depleted but intake has generally been greater than output by greater than a liter each day and the patient does have some dependent edema.  - Diuresis per nephrology recommendations.  - Fludrocortisone for hyperkalemia.  7/8/2022: Cr is stable to slowly improving.     Left hand ischemia:  Radial artery thrombosis identified on duplex Doppler.  Thrombectomy under local anesthesia and  MAC successful on 7/2. Completed high intensity heparin.  Continue daily aspirin.   -some bleeding overnight that achieved stasis    ID: Prior history of colonization with Mycobacterium peregrinum     Fevers: Febrile to 100.6 on 6/30, resolved within 24 hours.   - AFB to be sent on all future bronchs, last on 6/29  - IgG at one month 7/29 6/30 blood culture negative to date  6/29 respiratory cultures negative to date  6/28 respiratory culture with Stenotrophomonas maltophilia and Streptococcus pneumoniae        Stenotrophomonas Maltophilia: Noted in right explanted lung washings.   -  Sensitivities reviewed. Stopped treatment bactrim. Continue ceftazidime X 14d  --vanco restarted 7/7 for coverage of strep pneumoniae, finish a 10day course.     H/O Hep C: C: Diagnosed in 1980s, 2 mos of treatment, quant negative since 10/2017, last positive 2/20/17 ([phone]).Glenis positive on 6/2021 with negative HCV PCR. Hx of remote ETOH abuse. MR Elastography 4/27/21 with hepatology review and consult without any concerns post transplant.   6/29 HIV RNA negative  Hepatitis B DNA negative  Hepatitis C RNA negative  Hepatitis C antibody positive (old)    History of steroid induced hyperglycemia: Well controlled in outpatient. Management by primary team currently on insulin gtt, may need endo consult prior to discharge.      We appreciate the excellent care provided by the CVTS and CVICU teams.  Recommendations communicated via in person rounding and this note.  Will continue to follow along closely, please do not hesitate to call with any questions or concerns.     Subjective & Interval History:     Patient is communicative cooperative with interview or exam.  Opened eyes and followed some simple commands. Did not sleep well. BiPAP only for 1 to 1.5hrs overnight due to lack of tolerance. She has a weak cough. CP continues to be bothersome. Was up in chair daily.    Review of Systems:     She is noticing pain in the legs and abd pain.  "The abd pain is diffuse and cramps.   Leg swelling is stable.      Physical Exam:     All notes, images, and labs from past 24 hours (at minimum) were reviewed.    Vital signs:  Temp: 98  F (36.7  C) Temp src: Oral   Pulse: 85   Resp: 11 SpO2: 100 % O2 Device: BiPAP/CPAP Oxygen Delivery: 1 LPM Height: 157.5 cm (5' 2\") Weight: 76.2 kg (167 lb 15.9 oz)  I/O:     Intake/Output Summary (Last 24 hours) at 7/8/2022 1459  Last data filed at 7/8/2022 1400  Gross per 24 hour   Intake 2059.56 ml   Output 3310 ml   Net -1250.44 ml       Constitutional: Lying in bed, in no apparent distress.   HEENT: Eyes with pink conjunctivae, anicteric.  Oral mucosa moist without lesions.  Neck supple without lymphadenopathy.   PULM: Fair air flow bilaterally.  No crackles, no rhonchi, no wheezes.    CV: Normal S1 and S2.  Tachycardic RR.  ++ holosystolic murmur, + gallop, or rub.  + peripheral edema.   ABD: NABS, soft, mild diffusely tender, + distended.    MSK: Moves all extremities.  No apparent muscle wasting.   NEURO: somnolent, minimally conversant.   SKIN: Warm, dry.  No rash on limited exam.   PSYCH: calm.     Lines, Drains, and Devices:  Arterial Line 07/05/22 Brachial (Active)   Site Assessment WD 07/07/22 1200   Line Status Pulsatile blood flow 07/07/22 1200   Arterine Line Cap Change Due 07/08/22 07/07/22 1200   Art Line Waveform Appropriate 07/07/22 1200   Art Line Interventions Leveled;Flushed per protocol;Connections checked and tightened 07/07/22 1200   Color/Movement/Sensation Capillary refill less than 3 sec 07/07/22 1200   Line Necessity Yes, meets criteria 07/07/22 1200   Dressing Type Transparent 07/07/22 1200   Dressing Status Clean, dry, intact 07/07/22 1200   Dressing Intervention Dressing changed/new dressing 07/05/22 1200   Dressing Change Due 07/10/22 07/07/22 1200   Number of days: 2       CVC Double Lumen Right Internal jugular (Active)   Site Assessment WDL 07/07/22 1200   Dressing Type Chlorhexidine " disk;Transparent 07/07/22 1200   Dressing Status clean;dry;intact 07/07/22 1200   Dressing Intervention dressing reinforced 07/04/22 0000   Dressing Change Due 07/10/22 07/07/22 1200   Tubing Change secondary tubing;primary tubing 06/29/22 0400   Line Necessity yes, meets criteria 07/07/22 1200   Brown - Status transduced;infusing 07/07/22 1200   Brown - Cap Change Due 07/08/22 07/07/22 1200   White - Status infusing 07/07/22 1200   White - Cap Change Due 07/08/22 07/07/22 1200   Phlebitis Scale 0-->no symptoms 07/07/22 1200   Infiltration? no 07/07/22 1200   Infiltration Scale 0 07/07/22 1200   Infiltration Site Treatment Method  None 07/07/22 0400   Was a vesicant infusing? no 07/07/22 0400   CVC Comment MAC capped 07/07/22 1200   Number of days: 9       Left Radial Interventional Procedure Access (Active)   Site Assessment Ecchymotic 07/07/22 1200   Hemostasis management Unchanged 07/07/22 1200   CMS Left Arm WDL 07/07/22 1200   Radial Pulse - Left Arm Present with doppler 07/07/22 1200   Number of days: 5     Data:     LABS    CMP:   Recent Labs   Lab 07/08/22  1243 07/08/22  0827 07/08/22  0336 07/08/22  0332 07/08/22  0024 07/08/22  0022 07/07/22 2013 07/07/22  2012 07/07/22  1545 07/07/22  0406 07/07/22  0323 07/07/22  0004 07/07/22  0003 07/06/22  0405 07/06/22  0337 07/05/22  1214 07/05/22  1207 07/04/22  0327 07/04/22  0323   NA  --   --   --  140  --   --   --   --  136  --  136  --  134*   < > 132*   < > 134*   < >  --    POTASSIUM  --   --   --  4.0  --  3.4 3.8  --  3.3*  --  4.5  --  4.3   < > 5.2  5.5*   < > 5.5*   < >  --    CHLORIDE  --   --   --  94*  --   --   --   --  91*  --  90*  --  90*   < > 98   < > 99   < >  --    CO2  --   --   --  39*  --   --   --   --  35*  --  32*  --  34*   < > 28   < > 30*   < >  --    ANIONGAP  --   --   --  7  --   --   --   --  10  --  14  --  10   < > 6*   < > 5*   < >  --    * 114* 152* 168*   < >  --   --    < > 181*   < > 183*   < > 188*   < > 244*    < > 203*   < >  --    BUN  --   --   --  97.0*  --   --   --   --  99.6*  --  94.8*  --  93.4*   < > 82.7*   < > 75.4*   < >  --    CR  --   --   --  1.77*  --   --   --   --  1.80*  --  1.99*  --  1.98*   < > 1.98*   < > 2.09*   < >  --    GFRESTIMATED  --   --   --  32*  --   --   --   --  32*  --  28*  --  28*   < > 28*   < > 26*   < >  --    ESTUARDO  --   --   --  8.4*  --   --   --   --  8.3*  --  8.1*  --  8.0*   < > 8.0*   < > 11.9*   < >  --    MAG  --   --   --  2.5*  --   --   --   --   --   --  2.6*  --   --   --  2.9*  --  3.0*   < >  --    PHOS  --   --   --  4.6*  --   --   --   --   --   --  5.6*  --   --   --  3.5  --  3.3   < >  --    PROTTOTAL  --   --   --   --   --   --   --   --   --   --  4.6*  --   --   --  4.4*  --   --   --  4.4*   ALBUMIN  --   --   --   --   --   --   --   --   --   --  2.8*  --   --   --   --   --   --   --  2.6*   BILITOTAL  --   --   --   --   --   --   --   --   --   --  <0.2  --   --   --  <0.2  --   --   --  <0.2   ALKPHOS  --   --   --   --   --   --   --   --   --   --  63  --   --   --  59  --   --   --  60   AST  --   --   --   --   --   --   --   --   --   --  23  --   --   --  24  --   --   --  20   ALT  --   --   --   --   --   --   --   --   --   --  24  --   --   --  22  --   --   --  24    < > = values in this interval not displayed.     CBC:   Recent Labs   Lab 07/08/22  0818 07/08/22  0332 07/07/22 0818 07/06/22 0337   WBC 17.5* 14.3* 10.6 9.2   RBC 2.75* 2.70* 2.24* 2.41*   HGB 8.5* 8.3* 7.0* 7.4*   HCT 27.2* 26.8* 22.7* 25.0*   MCV 99 99 101* 104*   MCH 30.9 30.7 31.3 30.7   MCHC 31.3* 31.0* 30.8* 29.6*   RDW 16.4* 16.1* 15.0 15.2*    292 254 207       INR: No lab results found in last 7 days.    Glucose:   Recent Labs   Lab 07/08/22  1243 07/08/22  0827 07/08/22  0336 07/08/22  0332 07/08/22  0024 07/07/22 2012   * 114* 152* 168* 157* 142*       Blood Gas:   Recent Labs   Lab 07/08/22  0818 07/07/22  1501 07/07/22  0323 07/05/22  1435  07/05/22  1207 07/05/22  0345 07/04/22  1944   PHV  --   --   --   --  7.20* 7.27* 7.24*   PCO2V  --   --   --   --  81* 74* 77*   PO2V  --   --   --   --  47 41 41   HCO3V  --   --   --   --  32* 34* 33*   LINDY  --   --   --   --  3.1* 6.2* 4.5*   O2PER 1 21 25   < > 4 25 25    < > = values in this interval not displayed.       Culture Data No results for input(s): CULT in the last 168 hours.    Virology Data:   Lab Results   Component Value Date    FLUAH1 Not Detected 06/29/2022    FLUAH3 Not Detected 06/29/2022    OG6336 Not Detected 06/29/2022    IFLUB Not Detected 06/29/2022    RSVA Not Detected 06/29/2022    RSVB Not Detected 06/29/2022    PIV1 Not Detected 06/29/2022    PIV2 Not Detected 06/29/2022    PIV3 Not Detected 06/29/2022    HMPV Not Detected 06/29/2022       Historical CMV results (last 3 of prior testing):  No results found for: CMVQNT  No results found for: CMVLOG    Urine Studies    Recent Labs   Lab Test 07/08/22  0831 07/05/22  1004   URINEPH 5.5 5.5   NITRITE Negative Negative   LEUKEST Negative Negative   WBCU 1 5       Most Recent [facility] Pulmonary Function Testing (FVC/FEV1 only)  FVC-Pre   Date Value Ref Range Status   04/29/2022 1.82 L    11/11/2021 2.17 L    06/14/2021 2.00 L      FVC-%Pred-Pre   Date Value Ref Range Status   04/29/2022 58 %    11/11/2021 70 %    06/14/2021 64 %      FEV1-Pre   Date Value Ref Range Status   04/29/2022 0.51 L    11/11/2021 0.53 L    06/14/2021 0.54 L      FEV1-%Pred-Pre   Date Value Ref Range Status   04/29/2022 20 %    11/11/2021 21 %    06/14/2021 21 %        IMAGING    Recent Results (from the past 48 hour(s))   XR Chest Port 1 View    Narrative    EXAM: XR CHEST PORT 1 VIEW  7/6/2022 12:44 AM     HISTORY:  Post-Op Lung       COMPARISON:  7/5/2022    FINDINGS  Technique: Semiupright AP view of the chest.     Devices: Right internal jugular approach central venous catheter  terminates over the mid SVC. Bilateral apical and basal chest tubes  are not  appreciably changed. Clamshell sternotomy wires.    Unchanged streaky perihilar and bibasilar pulmonary opacities. Cardiac  silhouette is stable in size. Aortic arch calcifications. Remaining  trace right apical pneumothorax. No pleural effusion.       Impression    IMPRESSION:     1. Trace remaining right apical pneumothorax with bilateral chest  tubes in place.  2. Support devices stable.  3. Unchanged bilateral perihilar/basilar atelectasis/edema.    I have personally reviewed the examination and initial interpretation  and I agree with the findings.    GABRIELLA SNELL MD         SYSTEM ID:  [id number]   XR Chest Port 1 View    Narrative    Portable chest 7/6/2022 at 1306 hours    INDICATION: Post chest tube removal    COMPARISON: 0040 hours earlier today    FINDINGS: Interim bilateral thoracostomy tube removal. Clamshell  sternotomy from prior bilateral lung transplantation again present. No  conspicuous pneumothorax upon chest tube removal on either side. Right  basilar thoracostomy tube remains. Feeding tube beyond the inferior  margin of the image.      Impression    IMPRESSION: No conspicuous pneumothorax. Prior bilateral lung  transplant.    ALVINA HARRY MD         SYSTEM ID:  [id number]   XR Chest Port 1 View    Narrative    Exam: XR CHEST PORT 1 VIEW, 7/7/2022 5:47 AM    Indication: Post-Op Lung    Comparison: 7/6/2022    Findings:   Right IJ sheath over the SVC/innominate confluence. Feeding tube  courses below diaphragm with tip excluded from field-of-view.  Unchanged chest tubes and mediastinal drain.    Unchanged cardiac silhouette. Unchanged blunting of the left  hemidiaphragm. Unchanged small right hydropneumothorax. No new focal  pulmonary opacities.      Impression    Impression: Bilateral lung transplants with unchanged small right  hydropneumothorax.    I have personally reviewed the examination and initial interpretation  and I agree with the findings.    JOHANN MORAES MD         SYSTEM ID:   [id number]   Echo Limited   Result Value    LVEF  55-60%    [facility]    [id number]  [id number]  [id number]  [id number]^MARIO ALBERTO^HERNAN^RAJ     [facility],[place]  Echocardiography Laboratory  [address] [place] [id number]     Name: ALMAS CASIANO  MRN: [id number]  : 1960  Study Date: 2022 12:28 PM  Age: 62 yrs  Gender: Female  Patient Location: [facility] [place]  Reason For Study: Murmur  Ordering Physician: HERNAN LLANES  Performed By: Bonifacio Lin RDCS     BSA: 1.8 m2  Height: 62 in  Weight: 164 lb  HR: 86  BP: 115/54 mmHg  ______________________________________________________________________________  Procedure  Limited Portable Echo Adult.  ______________________________________________________________________________  Interpretation Summary     Global and regional left ventricular function is normal with an EF of 55-60%.  Global right ventricular function is normal.The right ventricle is normal  size.  No significant valvular abnormalities.  IVC diameter and respiratory changes fall into an intermediate range  suggesting an RA pressure of 8 mmHg.  No pericardial effusion is present.  This study was compared with the study from 7/3/22 there has been no change .  ______________________________________________________________________________  Left Ventricle  Left ventricular size is normal. Left ventricular wall thickness is normal.  Global and regional left ventricular function is normal with an EF of 55-60%.  No regional wall motion abnormalities are seen.     Right Ventricle  The right ventricle is normal size. Global right ventricular function is  normal.     Atria  Both atria appear normal.     Mitral Valve  The mitral valve is normal. Trace mitral insufficiency is present.     Aortic Valve  The aortic valve is tricuspid.     Tricuspid Valve  The peak velocity of the tricuspid regurgitant jet is not obtainable.  Pulmonary artery systolic pressure cannot be assessed.     Pulmonic  Valve  The valve leaflets are not well visualized. On Doppler interrogation, there is  no significant stenosis or regurgitation.     Vessels  IVC diameter and respiratory changes fall into an intermediate range  suggesting an RA pressure of 8 mmHg.     Pericardium  No pericardial effusion is present.     Compared to Previous Study  This study was compared with the study from 7/3/22 there has been no change .  ______________________________________________________________________________  MMode/2D Measurements & Calculations     IVSd: 1.2 cm  LVIDd: 4.4 cm  LVIDs: 2.3 cm  LVPWd: 1.2 cm  FS: 49.2 %  LV mass(C)d: 194.5 grams  LV mass(C)dI: 110.7 grams/m2  LA dimension: 4.0 cm  RWT: 0.55     ______________________________________________________________________________  Report approved by: Martínez Tanner 07/07/2022 01:33 PM         CT Chest w/o Contrast    Narrative    CT chest without contrast    Indication evaluate fluid collection. Recent one transplant 6/28    COMPARISON: Most recent available chest CT 11/11/2021    FINDINGS: Catheter from right side approach at the junction of the  right and left innominate veins at the upper margin of the SVC. Tubing  in the right axilla. Tube into the stomach progressing beyond the  inferior margin of the image. Trace right pleural effusion. Small left  pleural effusion. Cardiomegaly. No pericardial effusion. Small  mediastinal lymph nodes which may be reactive. Pericardial drain. Main  pulmonary artery mildly enlarged at 3.8 cm. Other tubing in the  abdomen which may be in a loop of bowel. Right thoracostomy tube.  Small amount of perihepatic ascites.  Bones show clamshell sternotomy from prior bilateral lung transplant.  Mild degenerative changes in the thoracic spine. There is contrast in  the stomach.  Right hydropneumothorax associated with the effusion. Chest wall  subcutaneous emphysema. Retrosternal air. There is some narrowing of  the left sided  anastomosis. Multiple anterior chest wall subcutaneous  emphysema. Air collection right chest wall anteriorly associated with  the pleura on the right. Please correlate for any sign of  bronchopleural fistula formation. Loculated fluid and air in the left  major fissure. No dominant pulmonary nodule. Other small  hydropneumothorax on the left.      Impression    IMPRESSION: Bilateral small hydropneumothoraces. Prior bilateral lung  transplant. Right chest wall abutting the right pleura, please  correlate for any evidence of bronchopleural fistula formation versus  recent postsurgical air. Enlarged pulmonary artery. Multiple support  devices. Cardiomegaly.    ALVINA HARRY MD         SYSTEM ID:  [id number]

## 2022-07-08 NOTE — TELEPHONE ENCOUNTER
RECORDS RECEIVED FROM: internal /ce    DATE RECEIVED: 9.13.22    NOTES (FOR ALL VISITS) STATUS DETAILS   OFFICE NOTES from referring provider internal  Claribel Franks MD   OFFICE NOTES from other specialist internal  6.28.21 [facility]      ED NOTES     OPERATIVE REPORT  (thyroid, pituitary, adrenal, parathyroid)     MEDICATION LIST internal     IMAGING      DEXASCAN internal  6.14.21,    MRI (BRAIN)     XR (Chest) internal /ce  Internal -7/8/22, 7.6.22, 6.28.22, more in epic  [facility]-  5/17/21, 5/9/22, 5/8/22   CT (HEAD/NECK/CHEST/ABDOMEN) internal  internal - 7/7/22, 6.28.22, more in epic    NUCLEAR      ULTRASOUND (HEAD/NECK)     LABS     DIABETES: HBGA1C, CREATININE, FASTING LIPIDS, MICROALBUMIN URINE, POTASSIUM, TSH, T4    THYROID: TSH, T4, CBC, THYRODLONULIN, TOTAL T3, FREE T4, CALCITONIN, CEA internal

## 2022-07-08 NOTE — PHARMACY-VANCOMYCIN DOSING SERVICE
Pharmacy Vancomycin Initial Note  Date of Service 2022  Patient's  1960  62 year old, female    Indication: Postoperative Infection    Current estimated CrCl = Estimated Creatinine Clearance: 31.5 mL/min (A) (based on SCr of 1.8 mg/dL (H)).    Creatinine for last 3 days  2022:  3:45 AM Creatinine 2.09 mg/dL; 12:07 PM Creatinine 2.09 mg/dL;  3:39 PM Creatinine 1.92 mg/dL;  8:20 PM Creatinine 2.17 mg/dL  2022: 12:12 AM Creatinine 2.16 mg/dL;  3:37 AM Creatinine 1.98 mg/dL;  8:06 AM Creatinine 1.97 mg/dL; 12:13 PM Creatinine 2.03 mg/dL;  3:54 PM Creatinine 2.03 mg/dL;  8:13 PM Creatinine 2.01 mg/dL  2022: 12:03 AM Creatinine 1.98 mg/dL;  3:23 AM Creatinine 1.99 mg/dL;  3:45 PM Creatinine 1.80 mg/dL    Recent Vancomycin Level(s) for last 3 days  No results found for requested labs within last 72 hours.      Vancomycin IV Administrations (past 72 hours)                   vancomycin 1500 mg in 0.9% NaCl 250 ml intermittent infusion 1,500 mg (mg) 1,500 mg New Bag 22 1839                Nephrotoxins and other renal medications (From now, onward)    Start     Dose/Rate Route Frequency Ordered Stop    22 1800  vancomycin (VANCOCIN) 750 mg in sodium chloride 0.9 % 250 mL intermittent infusion         750 mg  over 60-90 Minutes Intravenous EVERY 24 HOURS 22 1530  bumetanide (BUMEX) injection 6 mg         6 mg Intravenous ONCE 22 1527      22 1800  tacrolimus (GENERIC EQUIVALENT) suspension 6 mg         6 mg Oral 2 TIMES DAILY. 22 1428      22 1200  vasopressin 1 unit/mL MAX Conc (PITRESSIN) infusion         0.5-4 Units/hr  0.5-4 mL/hr  Intravenous CONTINUOUS 22 1134      22 0300  norepinephrine (LEVOPHED) 16 mg in  mL infusion MAX CONC CENTRAL LINE         0.01-0.6 mcg/kg/min × 65.7 kg  0.6-37 mL/hr  Intravenous CONTINUOUS 22 0256            Contrast Orders - past 72 hours (72h ago, onward)    None          [employer]  Prediction of Planned Initial Vancomycin Regimen  Loading dose: N/A  Regimen: 750 mg IV every 24 hours.  Start time: 21:00 on 07/07/2022  Exposure target: AUC24 (range)400-600 mg/L.hr   AUC24,ss: 516 mg/L.hr  Probability of AUC24 > 400: 93 %  Ctrough,ss: 17.0 mg/L  Probability of Ctrough,ss > 20: 22 %  Probability of nephrotoxicity (Lodise IVAN 2009): 13 %          Plan:  1. Start vancomycin  1500 mg IV x1, then 750 mg IV q24h.   2. Vancomycin monitoring method: AUC  3. Vancomycin therapeutic monitoring goal: 400-600 mg*h/L  4. Pharmacy will check vancomycin levels as appropriate in 1-3 Days.    5. Serum creatinine levels will be ordered daily for the first week of therapy and at least twice weekly for subsequent weeks.      Kalpana Harrison [facility]

## 2022-07-08 NOTE — PROGRESS NOTES
"Nephrology attending note:  This patient has been seen and evaluated by me, Maryam Arboleda MD on July 8, 2022. I have reviewed the history of present illness and the patient's clinical course. I personally evaluated, interviewed and examined the patient on the date of the encounter.       I reviewed the relevant labs, previous notes and imaging studies, and participated in the formulation of a diagnostic and treatment plan. This note reflects my direct involvement in the patient's care.     Key history:   62 female with hx of HTN, Hep C, osetopenia, previous polysubstance use (stopped 2019) and severe COPD who is s/p BSLT on 6/28/2022.  She developed BACILIO in setting of continued need for vasopressors thought to be due to vasoplegia.  Had radial artery thrombus requiring thrombectomy on 7/2 with no IV contrast used.       Key management decisions:   #1 acute kidney injury likely secondary to ischemic ATN; resolving with a brisk response to Bumex and an excellent urine output.  Her blood pressure is much better which favors a good renal perfusion state and almost off pressors. Her creatinine is down to 1.8 today. Potassium is within normal limits. Please dose another Bumex to target a negative balance of 1-1.5 liters per day or for hyperkalemia.        #2 acid-base status: She has acidemia with a pH of 7.29 which is mainly driven by respiratory acidosis post lung transplant/centrally mediated.  This is managed by the ICU team through the BiPAP.  I would avoid sodium bicarb at this stage with her new lung transplant; leading to more CO2 generation and poor clearance by the lungs .        Key exam findings:   /54 (BP Location: Right arm)   Pulse 82   Temp 98  F (36.7  C) (Oral)   Resp 12   Ht 1.575 m (5' 2\")   Wt 76.2 kg (167 lb 15.9 oz)   SpO2 98%   BMI 30.73 kg/m    Gen: awake   Lungs: symmetrical bilateral air entry anteriorly; mechanical breath sounds  Heart: RRR   Abdomen: positive bowel sounds, Soft " , not tender  Lower extremities: +1 edema       Intake/Output Summary (Last 24 hours) at 7/8/2022 1412  Last data filed at 7/8/2022 1300  Gross per 24 hour   Intake 2015.76 ml   Output 3085 ml   Net -1069.24 ml         Electrolytes/Renal -   Recent Labs   Lab Test 07/08/22  0332 07/08/22  0022 07/07/22 2013 07/07/22  1545 07/07/22  0323 07/06/22  0554 07/06/22  0337     --   --  136 136   < > 132*   POTASSIUM 4.0 3.4 3.8 3.3* 4.5   < > 5.2  5.5*   CO2 39*  --   --  35* 32*   < > 28   CR 1.77*  --   --  1.80* 1.99*   < > 1.98*   ESTUARDO 8.4*  --   --  8.3* 8.1*   < > 8.0*   PHOS 4.6*  --   --   --  5.6*  --  3.5    < > = values in this interval not displayed.       CBC -   Recent Labs   Lab Test 07/08/22 0818 07/08/22 0332 07/07/22 0818   WBC 17.5* 14.3* 10.6   HGB 8.5* 8.3* 7.0*    292 254     Current Facility-Administered Medications   Medication     acetaminophen (TYLENOL) tablet 650 mg     acetaminophen (TYLENOL) tablet 975 mg     acetylcysteine (MUCOMYST) 20 % nebulizer solution 2 mL     aspirin (ASA) chewable tablet 81 mg     bisacodyl (DULCOLAX) suppository 10 mg     calcium carbonate (TUMS) chewable tablet 500-1,000 mg     calcium carbonate 600 mg-vitamin D 400 units (CALTRATE) per tablet 1 tablet     cefTAZidime (FORTAZ) 2 g vial to attach to  ml bag for ADULTS or NS 50 ml bag for PEDS     dextrose 10% infusion     glucose gel 15-30 g    Or     dextrose 50 % injection 25-50 mL    Or     glucagon injection 1 mg     fludrocortisone (FLORINEF) tablet 0.1 mg     heparin (porcine) Infusion 1000 units/1000 mL for A-line     heparin ANTICOAGULANT injection 5,000 Units     hydrocortisone sodium succinate PF (solu-CORTEF) injection 50 mg     hydrOXYzine (ATARAX) tablet 25 mg    Or     hydrOXYzine (ATARAX) tablet 50 mg     insulin aspart (NovoLOG) injection (RAPID ACTING)     insulin glargine (LANTUS PEN) injection 35 Units     lactated ringers BOLUS 500 mL     levalbuterol (XOPENEX) neb solution  1.25 mg     Lidocaine (LIDOCARE) 4 % Patch 2 patch     lidocaine (LMX4) cream     lidocaine 1 % 0.1-1 mL     lidocaine patch in PLACE     magnesium hydroxide (MILK OF MAGNESIA) suspension 30 mL     methocarbamol (ROBAXIN) tablet 500 mg     metoprolol (LOPRESSOR) injection 2.5 mg     midodrine (PROAMATINE) tablet 30 mg     multivitamins w/minerals liquid 15 mL     mycophenolate (GENERIC EQUIVALENT) capsule 1,500 mg    Or     mycophenolate (CELLCEPT BRAND) suspension 1,500 mg     naloxone (NARCAN) injection 0.2 mg    Or     naloxone (NARCAN) injection 0.4 mg    Or     naloxone (NARCAN) injection 0.2 mg    Or     naloxone (NARCAN) injection 0.4 mg     norepinephrine (LEVOPHED) 16 mg in  mL infusion MAX CONC CENTRAL LINE     nystatin (MYCOSTATIN) cream     nystatin (MYCOSTATIN) suspension 1,000,000 Units     ondansetron (ZOFRAN ODT) ODT tab 4 mg    Or     ondansetron (ZOFRAN) injection 4 mg     oxyCODONE (ROXICODONE) tablet 5 mg     pantoprazole (PROTONIX) 2 mg/mL suspension 40 mg     polyethylene glycol (MIRALAX) Packet 17 g     prochlorperazine (COMPAZINE) injection 10 mg    Or     prochlorperazine (COMPAZINE) tablet 10 mg     protein modular (PROSOURCE TF) 1 packet     senna-docusate (SENOKOT-S/PERICOLACE) 8.6-50 MG per tablet 2 tablet     simethicone (MYLICON) chewable tablet 80 mg     sodium chloride (PF) 0.9% PF flush 3 mL     sodium chloride (PF) 0.9% PF flush 3 mL     [Held by provider] sulfamethoxazole-trimethoprim (BACTRIM) 400-80 MG per tablet 1 tablet     tacrolimus (GENERIC EQUIVALENT) suspension 6 mg     thiamine (B-1) injection 100 mg     valGANciclovir (VALCYTE) solution 450 mg     vancomycin (VANCOCIN) 750 mg in sodium chloride 0.9 % 250 mL intermittent infusion     vasopressin 1 unit/mL MAX Conc (PITRESSIN) infusion      Please avoid placing a PICC line in any patient with CKD III or above, BACILIO, or ESKD, as this will impact negatively the ability of the patient to have an AV fistula or AV Graft  should they need it in the future.  A medline is the preferred type of access in patients with kidney disease. Thank you.    Maryam Arboleda MD   [facility]   Department of Medicine   Division of Renal Disease and Hypertension

## 2022-07-08 NOTE — PLAN OF CARE
ICU End of Shift Summary. See flowsheets for vital signs and detailed assessment.    Changes this shift: Remains lethargic and confused, disoriented to time. Continues to require pressors intermittently to maintain maps>65. Levo at 0.02 to 0.06. Consistently refused bipap throughout the night, worn for 15-20 minutes at a time every 1-2 hours. Attempted cpap setting at patient requested, patient stated she wanted to bring her home trilogy since she can tolerate wearing that better. Minimal improvement in patient tolerance on cpap settings. Abdominal discomfort noted and increased distension, abd XR ordered, no significant findings per MD. Increased nausea, zofran given with some relief, tube feeds paused per MD. Nystantin cream for rash to perineum and inner thighs. K+ replaced per MD and not protocol due to risk for hyperkalemia.     Plan: continue to monitor abdominal pain and discomfort, may need further investigation if not improving. My need to hold long acting insulin if continuing to hold tube feeds      Goal Outcome Evaluation:    Plan of Care Reviewed With: patient, daughter     Overall Patient Progress: improving

## 2022-07-08 NOTE — PROGRESS NOTES
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 Sunshine. This was complicated by left upper extremity acute limb ischemia s/p left radial thrombectomy on 7/1/22. She remains critically ill requiring ventilatory and hemodynamic support.      PLAN SUMMARY:    S/p BSLT  - Immunosuppression and prophylaxis per pulmonary transplant team    Hypercarbic respiratory failure  - Trend ABG's  - Monitor mental status and work of breathing; no indication for re-intubation   - Wean off BiPAP (ongoing respiratory acidosis) as able; HFNC if able    Acute kidney injury 2/2 ischemic ATN; improving  - Bumex 5 x 1; follow-up with nephrology re: recs  - CRRT    Distributive shock; improving  - Wean off pressors as able; continues to require NE  - Midodrine 30 mg q8h  - Hydrocortisone stress dose + fludrocortisone given concern for functional adrenal insufficiency and hyperkalemia.  - Prednisone held while on stress dose hydrocortisone.  Reinitiate prednisolone when hydrocortisone is less than 120 mg in 24 hours.    Stenotrophomonas Maltophilia   - Continue ceftazidime X 14d  - Vancomycin restarted 7/7 for coverage of strep pneumoniae        PLAN:  Neuro/ pain/ sedation:  Acute postoperative pain  - Monitor neurological status. Notify the MD for any acute changes in exam.  - Scheduled: acetaminophen, robaxin. PRN: oxycodone     Pulmonary:  #Postoperative ventilatory support  #Hypercarbic respiratory failure  #Bilateral Lung Transplant  #Hx COPD  - Titrate FiO2 for SpO2 >92%  - Pulmonary hygiene: Incentive spirometer every 15- 30 minutes when awake  - Basiliximab POD #0 and #4, prednisolone, mycophenolate, tacrolimus  - Valganciclovir       Cardiovascular:  #Hx HTN  #Hx HFpEF  - Monitor hemodynamic status.   - Goal MAP >65  -  Norepinephrine drip, wean as tolerated  - Midodrine 30 q8h     GI care/ Nutrition:   - NJ TF @ goal  - PPI: protonix  - Continue bowel regimen: miralax; PRN moM    Renal/ Fluid Balance/ Electrolytes:   #BACILIO  - Strict I/O, daily weights  - Avoid/limit nephrotoxins as able  - Replete lytes PRN per protocol     Endocrine:    Stress induced hyperglycemia  - High sliding scale insulin 7/2  - Lantus 35 units daily     ID/ Antibiotics:  Stress-induced leukocytosis  - Continue to monitor fever curve, WBC and inflammatory markers as appropriate  - Prophylaxis: POD #8-90 nystatin, TMP/SMX (discontined due to BACILIO), valganciclovir  - Post-op abx: ceftazadime extended to 14 day coverage given elevated temp and WBCs  - Post-op cultures:   - Gram stain (1+ G+ cocci)   - Resp aerobic cx (4+ G- bacilli)   - Fungal cx (NGTD)   - AFB cx (NGTD)  - Blood cx sent 6/30 negative  - Continue ceftazidime; added vancomycin on 7/7     Heme:     Acute blood loss anemia  Acute blood loss thrombocytopenia  Left upper extremity acute limb ischemia s/p left radial thrombectomy on 7/1/22  - Transitioned from heparin gtt to SQH per vascular surgery  - Daily CBC        Prophylaxis:    - DVT: mechanical +SQH  - PPI   - PT/OT     Lines/ tubes/ drains:  - CTs x5, RIJ, PIV x2, a-line  - 4 Pleural, 1 med     Disposition:  - CVICU    Patient seen, findings and plan discussed with CV ICU staff, Dr. Bolden.    Danuta Chairez MD  Anesthesiology Resident, PGY-4        ====================================    SUBJECTIVE:   C/o abdominal tenderness, R>L; KUB unremarkable. Not tolerating BiPAP.     OBJECTIVE:   1. VITAL SIGNS:   Temp:  [97.2  F (36.2  C)-98.9  F (37.2  C)] (P) 98.8  F (37.1  C)  Pulse:  [] 84  Resp:  [12-31] 14  MAP:  [61 mmHg-102 mmHg] 67 mmHg  Arterial Line BP: (101-160)/(38-70) 112/43  FiO2 (%):  [21 %-28 %] 28 %  SpO2:  [89 %-100 %] 97 %  FiO2 (%): 28 %  Resp: 14      2. INTAKE/ OUTPUT:   I/O last 3 completed shifts:  In: 2376.5  [I.V.:446.5; NG/GT:880]  Out: 3400 [Urine:2440; Chest Tube:960]    3. PHYSICAL EXAMINATION:   General: up in chair; follows commands  Neuro: grossly intact  Resp: on HFNC  CV: RRR  Abdomen: Soft, Non-distended, Non-tender  Incisions: c/d/i  Extremities: warm and well perfused    4. INVESTIGATIONS:   Arterial Blood Gases   Recent Labs   Lab 07/08/22  0818 07/07/22  1501 07/07/22 0323 07/06/22  2158   PH 7.33* 7.29* 7.30* 7.27*   PCO2 82* 84* 75* 78*   PO2 133* 72* 75* 152*   HCO3 43* 40* 37* 36*     Complete Blood Count   Recent Labs   Lab 07/08/22 0818 07/08/22 0332 07/07/22 0818 07/06/22 0337   WBC 17.5* 14.3* 10.6 9.2   HGB 8.5* 8.3* 7.0* 7.4*    292 254 207     Basic Metabolic Panel  Recent Labs   Lab 07/08/22  0827 07/08/22  0336 07/08/22  0332 07/08/22  0024 07/08/22  0022 07/07/22 2013 07/07/22 2012 07/07/22  1545 07/07/22  0406 07/07/22 0323 07/07/22  0004 07/07/22  0003   NA  --   --  140  --   --   --   --  136  --  136  --  134*   POTASSIUM  --   --  4.0  --  3.4 3.8  --  3.3*  --  4.5  --  4.3   CHLORIDE  --   --  94*  --   --   --   --  91*  --  90*  --  90*   CO2  --   --  39*  --   --   --   --  35*  --  32*  --  34*   BUN  --   --  97.0*  --   --   --   --  99.6*  --  94.8*  --  93.4*   CR  --   --  1.77*  --   --   --   --  1.80*  --  1.99*  --  1.98*   * 152* 168* 157*  --   --    < > 181*   < > 183*   < > 188*    < > = values in this interval not displayed.     Liver Function Tests  Recent Labs   Lab 07/07/22 0323 07/06/22 0337 07/04/22  0323   AST 23 24 20   ALT 24 22 24   ALKPHOS 63 59 60   BILITOTAL <0.2 <0.2 <0.2   ALBUMIN 2.8*  --  2.6*     Pancreatic Enzymes  Recent Labs   Lab 07/08/22  0332   LIPASE 17     Coagulation Profile  No lab results found in last 7 days.      5. RADIOLOGY:   Recent Results (from the past 24 hour(s))   Echo Limited   Result Value    LVEF  55-60%    Narrative    [id number]  [id number]  [id number]  [id number]^MARIO ALBERTO^HERNAN^RAJ     [facility]  [facility],[place]  Echocardiography Laboratory  [address] [place] [address]     Name: ALMAS CASIANO  MRN: [id number]  : 1960  Study Date: 2022 12:28 PM  Age: 62 yrs  Gender: Female  [place] [facility] [place] For Study: Murmur  Ordering Physician: HERNAN LLANES  Performed By: Bonifacio Lin RDCS     BSA: 1.8 m2  Height: 62 in  Weight: 164 lb  HR: 86  BP: 115/54 mmHg  ______________________________________________________________________________  Procedure  Limited Portable Echo Adult.  ______________________________________________________________________________  Interpretation Summary     Global and regional left ventricular function is normal with an EF of 55-60%.  Global right ventricular function is normal.The right ventricle is normal  size.  No significant valvular abnormalities.  IVC diameter and respiratory changes fall into an intermediate range  suggesting an RA pressure of 8 mmHg.  No pericardial effusion is present.  This study was compared with the study from 7/3/22 there has been no change .  ______________________________________________________________________________  Left Ventricle  Left ventricular size is normal. Left ventricular wall thickness is normal.  Global and regional left ventricular function is normal with an EF of 55-60%.  No regional wall motion abnormalities are seen.     Right Ventricle  The right ventricle is normal size. Global right ventricular function is  normal.     Atria  Both atria appear normal.     Mitral Valve  The mitral valve is normal. Trace mitral insufficiency is present.     Aortic Valve  The aortic valve is tricuspid.     Tricuspid Valve  The peak velocity of the tricuspid regurgitant jet is not obtainable.  Pulmonary artery systolic pressure cannot be assessed.     Pulmonic Valve  The valve leaflets are not well visualized. On Doppler interrogation, there is  no significant stenosis or regurgitation.     Vessels  IVC diameter  and respiratory changes fall into an intermediate range  suggesting an RA pressure of 8 mmHg.     Pericardium  No pericardial effusion is present.     Compared to Previous Study  This study was compared with the study from 7/3/22 there has been no change .  ______________________________________________________________________________  MMode/2D Measurements & Calculations     IVSd: 1.2 cm  LVIDd: 4.4 cm  LVIDs: 2.3 cm  LVPWd: 1.2 cm  FS: 49.2 %  LV mass(C)d: 194.5 grams  LV mass(C)dI: 110.7 grams/m2  LA dimension: 4.0 cm  RWT: 0.55     ______________________________________________________________________________  Report approved by: Martínez Tanner 07/07/2022 01:33 PM         CT Head w/o Contrast    Narrative    CT HEAD W/O CONTRAST 7/7/2022 2:13 PM    History: rule out central process for impaired ventilation     Comparison: None available    Technique: Using multidetector thin collimation helical acquisition  technique, axial, coronal and sagittal CT images from the skull base  to the vertex were obtained without intravenous contrast.    Findings: There is no intracranial hemorrhage, mass effect, or midline  shift. No acute loss of gray-white differentiation.. Ventricles are  proportionate to the cerebral sulci. The basal cisterns are patent.     The bony calvaria and the bones of the skull base are normal. The  visualized portions of the paranasal sinuses and mastoid air cells are  clear. Partially visualized nasogastric tube.      Impression    Impression:  No acute intracranial pathology.          I have personally reviewed the examination and initial interpretation  and I agree with the findings.    DICK AMAYA MD         SYSTEM ID:  [id number]   CT Chest w/o Contrast    Narrative    CT chest without contrast    Indication evaluate fluid collection. Recent one transplant 6/28    COMPARISON: Most recent available chest CT 11/11/2021    FINDINGS: Catheter from right side approach at the  junction of the  right and left innominate veins at the upper margin of the SVC. Tubing  in the right axilla. Tube into the stomach progressing beyond the  inferior margin of the image. Trace right pleural effusion. Small left  pleural effusion. Cardiomegaly. No pericardial effusion. Small  mediastinal lymph nodes which may be reactive. Pericardial drain. Main  pulmonary artery mildly enlarged at 3.8 cm. Other tubing in the  abdomen which may be in a loop of bowel. Right thoracostomy tube.  Small amount of perihepatic ascites.  Bones show clamshell sternotomy from prior bilateral lung transplant.  Mild degenerative changes in the thoracic spine. There is contrast in  the stomach.  Right hydropneumothorax associated with the effusion. Chest wall  subcutaneous emphysema. Retrosternal air. There is some narrowing of  the left sided anastomosis. Multiple anterior chest wall subcutaneous  emphysema. Air collection right chest wall anteriorly associated with  the pleura on the right. Please correlate for any sign of  bronchopleural fistula formation. Loculated fluid and air in the left  major fissure. No dominant pulmonary nodule. Other small  hydropneumothorax on the left.      Impression    IMPRESSION: Bilateral small hydropneumothoraces. Prior bilateral lung  transplant. Right chest wall abutting the right pleura, please  correlate for any evidence of bronchopleural fistula formation versus  recent postsurgical air. Enlarged pulmonary artery. Multiple support  devices. Cardiomegaly.    ALVINA HARRY MD         SYSTEM ID:  [id number]   XR Chest Port 1 View    Narrative    EXAM: XR CHEST PORT 1 VIEW  7/8/2022 3:58 AM     HISTORY:  chest tubes and R hydropneumothorax s/p bilateral lung  transplant       COMPARISON:  7/7/2022 radiograph, CT    FINDINGS  Technique: Semiupright AP view of the chest.     Devices: Right internal jugular approach central venous catheter  terminates near the confluence of the innominate  veins.. Bilateral  apical and basal chest tubes are not appreciably changed. Clamshell  sternotomy wires.    Stable perihilar and bibasilar pulmonary opacities. Cardiac silhouette  is stable in size. Aortic arch calcifications. Remaining trace right  apical pneumothorax. No pleural effusion.       Impression    IMPRESSION:     1. Unchanged small right hydropneumothorax.  2. Stable perihilar/bibasilar pulmonary opacities, atelectasis versus  edema.    I have personally reviewed the examination and initial interpretation  and I agree with the findings.    GABRIELLA SNELL MD         SYSTEM ID:  [id number]   XR Abdomen Port 1 View    Narrative    EXAM: XR ABDOMEN PORT 1 VIEWS, 7/8/2022 6:39 AM    INDICATION: abdominal pain, increased distension    COMPARISON: 7/1/2022 fluoroscopy, 6/30/2022 radiograph    FINDINGS  Technique: Supine AP view of the abdomen.      Devices: Feeding tube terminates over the second portion of the  duodenum. Bibasilar chest tubes visualized.    Enteric contrast in the stomach. No dilated bowel or pneumatosis.      Impression    IMPRESSION:   No dilated bowel to suggest obstruction. Moderate colonic stool  burden.    I have personally reviewed the examination and initial interpretation  and I agree with the findings.    GABRIELLA SNELL MD         SYSTEM ID:  [id number]       =========================================

## 2022-07-09 ENCOUNTER — APPOINTMENT (OUTPATIENT)
Dept: GENERAL RADIOLOGY | Facility: CLINIC | Age: 62
DRG: 007 | End: 2022-07-09
Attending: THORACIC SURGERY (CARDIOTHORACIC VASCULAR SURGERY)
Payer: MEDICARE

## 2022-07-09 LAB
ANION GAP SERPL CALCULATED.3IONS-SCNC: 10 MMOL/L (ref 7–15)
ANION GAP SERPL CALCULATED.3IONS-SCNC: 8 MMOL/L (ref 7–15)
BACTERIA BLD CULT: NO GROWTH
BACTERIA BLD CULT: NO GROWTH
BASE EXCESS BLDA CALC-SCNC: 12.6 MMOL/L (ref -9–1.8)
BASOPHILS # BLD AUTO: 0 10E3/UL (ref 0–0.2)
BASOPHILS NFR BLD AUTO: 0 %
BUN SERPL-MCNC: 104 MG/DL (ref 8–23)
BUN SERPL-MCNC: 110 MG/DL (ref 8–23)
CALCIUM SERPL-MCNC: 8.4 MG/DL (ref 8.8–10.2)
CALCIUM SERPL-MCNC: 8.7 MG/DL (ref 8.8–10.2)
CHLORIDE SERPL-SCNC: 94 MMOL/L (ref 98–107)
CHLORIDE SERPL-SCNC: 96 MMOL/L (ref 98–107)
CREAT SERPL-MCNC: 1.96 MG/DL (ref 0.51–0.95)
CREAT SERPL-MCNC: 2.18 MG/DL (ref 0.51–0.95)
DEPRECATED HCO3 PLAS-SCNC: 37 MMOL/L (ref 22–29)
DEPRECATED HCO3 PLAS-SCNC: 39 MMOL/L (ref 22–29)
EOSINOPHIL # BLD AUTO: 0 10E3/UL (ref 0–0.7)
EOSINOPHIL NFR BLD AUTO: 0 %
ERYTHROCYTE [DISTWIDTH] IN BLOOD BY AUTOMATED COUNT: 16.3 % (ref 10–15)
GFR SERPL CREATININE-BSD FRML MDRD: 25 ML/MIN/1.73M2
GFR SERPL CREATININE-BSD FRML MDRD: 29 ML/MIN/1.73M2
GLUCOSE BLDC GLUCOMTR-MCNC: 120 MG/DL (ref 70–99)
GLUCOSE BLDC GLUCOMTR-MCNC: 126 MG/DL (ref 70–99)
GLUCOSE BLDC GLUCOMTR-MCNC: 134 MG/DL (ref 70–99)
GLUCOSE BLDC GLUCOMTR-MCNC: 148 MG/DL (ref 70–99)
GLUCOSE BLDC GLUCOMTR-MCNC: 148 MG/DL (ref 70–99)
GLUCOSE BLDC GLUCOMTR-MCNC: 233 MG/DL (ref 70–99)
GLUCOSE SERPL-MCNC: 125 MG/DL (ref 70–99)
GLUCOSE SERPL-MCNC: 183 MG/DL (ref 70–99)
HCO3 BLD-SCNC: 41 MMOL/L (ref 21–28)
HCT VFR BLD AUTO: 26.5 % (ref 35–47)
HCT VFR BLD AUTO: 26.5 % (ref 35–47)
HCT VFR BLD AUTO: 27.2 % (ref 35–47)
HGB BLD-MCNC: 7.9 G/DL (ref 11.7–15.7)
HGB BLD-MCNC: 7.9 G/DL (ref 11.7–15.7)
HGB BLD-MCNC: 8.4 G/DL (ref 11.7–15.7)
IMM GRANULOCYTES # BLD: 0.1 10E3/UL
IMM GRANULOCYTES NFR BLD: 1 %
LACTATE SERPL-SCNC: 0.5 MMOL/L (ref 0.7–2)
LYMPHOCYTES # BLD AUTO: 0.5 10E3/UL (ref 0.8–5.3)
LYMPHOCYTES NFR BLD AUTO: 3 %
MAGNESIUM SERPL-MCNC: 2.5 MG/DL (ref 1.7–2.3)
MCH RBC QN AUTO: 30.4 PG (ref 26.5–33)
MCH RBC QN AUTO: 30.4 PG (ref 26.5–33)
MCH RBC QN AUTO: 31.3 PG (ref 26.5–33)
MCHC RBC AUTO-ENTMCNC: 29.8 G/DL (ref 31.5–36.5)
MCHC RBC AUTO-ENTMCNC: 29.8 G/DL (ref 31.5–36.5)
MCHC RBC AUTO-ENTMCNC: 30.9 G/DL (ref 31.5–36.5)
MCV RBC AUTO: 102 FL (ref 78–100)
MONOCYTES # BLD AUTO: 1.1 10E3/UL (ref 0–1.3)
MONOCYTES NFR BLD AUTO: 6 %
NEUTROPHILS # BLD AUTO: 14.8 10E3/UL (ref 1.6–8.3)
NEUTROPHILS NFR BLD AUTO: 90 %
NRBC # BLD AUTO: 0 10E3/UL
NRBC BLD AUTO-RTO: 0 /100
O2/TOTAL GAS SETTING VFR VENT: 21 %
PCO2 BLD: 85 MM HG (ref 35–45)
PH BLD: 7.29 [PH] (ref 7.35–7.45)
PHOSPHATE SERPL-MCNC: 6.2 MG/DL (ref 2.5–4.5)
PLATELET # BLD AUTO: 302 10E3/UL (ref 150–450)
PLATELET # BLD AUTO: 302 10E3/UL (ref 150–450)
PLATELET # BLD AUTO: 339 10E3/UL (ref 150–450)
PO2 BLD: 100 MM HG (ref 80–105)
POTASSIUM SERPL-SCNC: 4.3 MMOL/L (ref 3.4–5.3)
POTASSIUM SERPL-SCNC: 4.5 MMOL/L (ref 3.4–5.3)
RBC # BLD AUTO: 2.6 10E6/UL (ref 3.8–5.2)
RBC # BLD AUTO: 2.6 10E6/UL (ref 3.8–5.2)
RBC # BLD AUTO: 2.68 10E6/UL (ref 3.8–5.2)
SODIUM SERPL-SCNC: 141 MMOL/L (ref 136–145)
SODIUM SERPL-SCNC: 143 MMOL/L (ref 136–145)
TACROLIMUS BLD-MCNC: 6 UG/L (ref 5–15)
TME LAST DOSE: NORMAL H
TME LAST DOSE: NORMAL H
VANCOMYCIN SERPL-MCNC: 34.3 UG/ML
WBC # BLD AUTO: 16.5 10E3/UL (ref 4–11)
WBC # BLD AUTO: 16.5 10E3/UL (ref 4–11)
WBC # BLD AUTO: 19.1 10E3/UL (ref 4–11)

## 2022-07-09 PROCEDURE — 80197 ASSAY OF TACROLIMUS: CPT | Performed by: INTERNAL MEDICINE

## 2022-07-09 PROCEDURE — 80048 BASIC METABOLIC PNL TOTAL CA: CPT | Performed by: STUDENT IN AN ORGANIZED HEALTH CARE EDUCATION/TRAINING PROGRAM

## 2022-07-09 PROCEDURE — 250N000012 HC RX MED GY IP 250 OP 636 PS 637: Performed by: SURGERY

## 2022-07-09 PROCEDURE — 200N000002 HC R&B ICU UMMC

## 2022-07-09 PROCEDURE — 82803 BLOOD GASES ANY COMBINATION: CPT | Performed by: STUDENT IN AN ORGANIZED HEALTH CARE EDUCATION/TRAINING PROGRAM

## 2022-07-09 PROCEDURE — 250N000013 HC RX MED GY IP 250 OP 250 PS 637: Performed by: SURGERY

## 2022-07-09 PROCEDURE — 250N000011 HC RX IP 250 OP 636: Performed by: STUDENT IN AN ORGANIZED HEALTH CARE EDUCATION/TRAINING PROGRAM

## 2022-07-09 PROCEDURE — 85014 HEMATOCRIT: CPT | Performed by: STUDENT IN AN ORGANIZED HEALTH CARE EDUCATION/TRAINING PROGRAM

## 2022-07-09 PROCEDURE — 250N000012 HC RX MED GY IP 250 OP 636 PS 637: Performed by: INTERNAL MEDICINE

## 2022-07-09 PROCEDURE — 250N000012 HC RX MED GY IP 250 OP 636 PS 637: Performed by: STUDENT IN AN ORGANIZED HEALTH CARE EDUCATION/TRAINING PROGRAM

## 2022-07-09 PROCEDURE — 71045 X-RAY EXAM CHEST 1 VIEW: CPT

## 2022-07-09 PROCEDURE — 99233 SBSQ HOSP IP/OBS HIGH 50: CPT | Mod: 24 | Performed by: STUDENT IN AN ORGANIZED HEALTH CARE EDUCATION/TRAINING PROGRAM

## 2022-07-09 PROCEDURE — 250N000009 HC RX 250: Performed by: SURGERY

## 2022-07-09 PROCEDURE — 250N000011 HC RX IP 250 OP 636

## 2022-07-09 PROCEDURE — 85025 COMPLETE CBC W/AUTO DIFF WBC: CPT | Performed by: SURGERY

## 2022-07-09 PROCEDURE — 84100 ASSAY OF PHOSPHORUS: CPT | Performed by: SURGERY

## 2022-07-09 PROCEDURE — 71045 X-RAY EXAM CHEST 1 VIEW: CPT | Mod: 26 | Performed by: RADIOLOGY

## 2022-07-09 PROCEDURE — 250N000011 HC RX IP 250 OP 636: Performed by: INTERNAL MEDICINE

## 2022-07-09 PROCEDURE — 999N000157 HC STATISTIC RCP TIME EA 10 MIN

## 2022-07-09 PROCEDURE — 250N000013 HC RX MED GY IP 250 OP 250 PS 637: Performed by: INTERNAL MEDICINE

## 2022-07-09 PROCEDURE — 94668 MNPJ CHEST WALL SBSQ: CPT

## 2022-07-09 PROCEDURE — 250N000013 HC RX MED GY IP 250 OP 250 PS 637

## 2022-07-09 PROCEDURE — 83605 ASSAY OF LACTIC ACID: CPT | Performed by: ANESTHESIOLOGY

## 2022-07-09 PROCEDURE — 83735 ASSAY OF MAGNESIUM: CPT | Performed by: SURGERY

## 2022-07-09 PROCEDURE — 94640 AIRWAY INHALATION TREATMENT: CPT | Mod: 76

## 2022-07-09 PROCEDURE — 80202 ASSAY OF VANCOMYCIN: CPT | Performed by: THORACIC SURGERY (CARDIOTHORACIC VASCULAR SURGERY)

## 2022-07-09 PROCEDURE — 250N000011 HC RX IP 250 OP 636: Performed by: THORACIC SURGERY (CARDIOTHORACIC VASCULAR SURGERY)

## 2022-07-09 PROCEDURE — 250N000013 HC RX MED GY IP 250 OP 250 PS 637: Performed by: STUDENT IN AN ORGANIZED HEALTH CARE EDUCATION/TRAINING PROGRAM

## 2022-07-09 PROCEDURE — 250N000011 HC RX IP 250 OP 636: Performed by: SURGERY

## 2022-07-09 PROCEDURE — 94640 AIRWAY INHALATION TREATMENT: CPT

## 2022-07-09 PROCEDURE — 99233 SBSQ HOSP IP/OBS HIGH 50: CPT | Mod: 24 | Performed by: INTERNAL MEDICINE

## 2022-07-09 RX ADMIN — NYSTATIN 1000000 UNITS: 100000 SUSPENSION ORAL at 16:27

## 2022-07-09 RX ADMIN — HYDROCORTISONE SODIUM SUCCINATE 50 MG: 100 INJECTION, POWDER, FOR SOLUTION INTRAMUSCULAR; INTRAVENOUS at 04:16

## 2022-07-09 RX ADMIN — ACETAMINOPHEN 975 MG: 325 TABLET, FILM COATED ORAL at 04:16

## 2022-07-09 RX ADMIN — LEVALBUTEROL HYDROCHLORIDE 1.25 MG: 1.25 SOLUTION RESPIRATORY (INHALATION) at 17:11

## 2022-07-09 RX ADMIN — NYSTATIN: 100000 CREAM TOPICAL at 20:51

## 2022-07-09 RX ADMIN — TACROLIMUS 9 MG: 5 CAPSULE ORAL at 17:54

## 2022-07-09 RX ADMIN — NYSTATIN 1000000 UNITS: 100000 SUSPENSION ORAL at 11:37

## 2022-07-09 RX ADMIN — HEPARIN SODIUM 5000 UNITS: 5000 INJECTION, SOLUTION INTRAVENOUS; SUBCUTANEOUS at 22:00

## 2022-07-09 RX ADMIN — LEVALBUTEROL HYDROCHLORIDE 1.25 MG: 1.25 SOLUTION RESPIRATORY (INHALATION) at 19:44

## 2022-07-09 RX ADMIN — MYCOPHENOLATE MOFETIL 1500 MG: 250 CAPSULE ORAL at 08:44

## 2022-07-09 RX ADMIN — FLUDROCORTISONE ACETATE 0.1 MG: 0.1 TABLET ORAL at 08:45

## 2022-07-09 RX ADMIN — MIDODRINE HYDROCHLORIDE 30 MG: 5 TABLET ORAL at 00:34

## 2022-07-09 RX ADMIN — ACETYLCYSTEINE 2 ML: 200 SOLUTION ORAL; RESPIRATORY (INHALATION) at 17:11

## 2022-07-09 RX ADMIN — MULTIVITAMIN 15 ML: LIQUID ORAL at 08:47

## 2022-07-09 RX ADMIN — CALCIUM CARBONATE 600 MG (1,500 MG)-VITAMIN D3 400 UNIT TABLET 1 TABLET: at 17:53

## 2022-07-09 RX ADMIN — ACETYLCYSTEINE 2 ML: 200 SOLUTION ORAL; RESPIRATORY (INHALATION) at 08:05

## 2022-07-09 RX ADMIN — ACETYLCYSTEINE 2 ML: 200 SOLUTION ORAL; RESPIRATORY (INHALATION) at 12:32

## 2022-07-09 RX ADMIN — MIDODRINE HYDROCHLORIDE 30 MG: 5 TABLET ORAL at 08:43

## 2022-07-09 RX ADMIN — ACETAMINOPHEN 975 MG: 325 TABLET, FILM COATED ORAL at 11:38

## 2022-07-09 RX ADMIN — METHOCARBAMOL 500 MG: 500 TABLET ORAL at 10:52

## 2022-07-09 RX ADMIN — ACETAMINOPHEN 975 MG: 325 TABLET, FILM COATED ORAL at 20:50

## 2022-07-09 RX ADMIN — THIAMINE HYDROCHLORIDE 100 MG: 100 INJECTION, SOLUTION INTRAMUSCULAR; INTRAVENOUS at 08:53

## 2022-07-09 RX ADMIN — CEFTAZIDIME 2 G: 2 INJECTION, POWDER, FOR SOLUTION INTRAVENOUS at 08:48

## 2022-07-09 RX ADMIN — OXYCODONE HYDROCHLORIDE 5 MG: 5 TABLET ORAL at 10:52

## 2022-07-09 RX ADMIN — HYDROCORTISONE SODIUM SUCCINATE 50 MG: 100 INJECTION, POWDER, FOR SOLUTION INTRAMUSCULAR; INTRAVENOUS at 10:01

## 2022-07-09 RX ADMIN — ACETYLCYSTEINE 2 ML: 200 SOLUTION ORAL; RESPIRATORY (INHALATION) at 19:44

## 2022-07-09 RX ADMIN — OXYCODONE HYDROCHLORIDE 5 MG: 5 TABLET ORAL at 14:33

## 2022-07-09 RX ADMIN — ACETAMINOPHEN 650 MG: 325 TABLET, FILM COATED ORAL at 11:38

## 2022-07-09 RX ADMIN — POLYETHYLENE GLYCOL 3350 17 G: 17 POWDER, FOR SOLUTION ORAL at 08:46

## 2022-07-09 RX ADMIN — INSULIN ASPART 1 UNITS: 100 INJECTION, SOLUTION INTRAVENOUS; SUBCUTANEOUS at 09:32

## 2022-07-09 RX ADMIN — HEPARIN SODIUM 5000 UNITS: 5000 INJECTION, SOLUTION INTRAVENOUS; SUBCUTANEOUS at 06:35

## 2022-07-09 RX ADMIN — LEVALBUTEROL HYDROCHLORIDE 1.25 MG: 1.25 SOLUTION RESPIRATORY (INHALATION) at 12:32

## 2022-07-09 RX ADMIN — NYSTATIN 1000000 UNITS: 100000 SUSPENSION ORAL at 20:50

## 2022-07-09 RX ADMIN — Medication 40 MG: at 09:07

## 2022-07-09 RX ADMIN — NYSTATIN: 100000 CREAM TOPICAL at 08:49

## 2022-07-09 RX ADMIN — NYSTATIN 1000000 UNITS: 100000 SUSPENSION ORAL at 08:46

## 2022-07-09 RX ADMIN — HEPARIN SODIUM 5000 UNITS: 5000 INJECTION, SOLUTION INTRAVENOUS; SUBCUTANEOUS at 14:34

## 2022-07-09 RX ADMIN — SENNOSIDES AND DOCUSATE SODIUM 2 TABLET: 8.6; 5 TABLET ORAL at 08:45

## 2022-07-09 RX ADMIN — INSULIN ASPART 4 UNITS: 100 INJECTION, SOLUTION INTRAVENOUS; SUBCUTANEOUS at 20:55

## 2022-07-09 RX ADMIN — TACROLIMUS 9 MG: 5 CAPSULE ORAL at 08:49

## 2022-07-09 RX ADMIN — ASPIRIN 81 MG CHEWABLE TABLET 81 MG: 81 TABLET CHEWABLE at 08:44

## 2022-07-09 RX ADMIN — INSULIN ASPART 1 UNITS: 100 INJECTION, SOLUTION INTRAVENOUS; SUBCUTANEOUS at 15:52

## 2022-07-09 RX ADMIN — LEVALBUTEROL HYDROCHLORIDE 1.25 MG: 1.25 SOLUTION RESPIRATORY (INHALATION) at 08:06

## 2022-07-09 RX ADMIN — MIDODRINE HYDROCHLORIDE 30 MG: 5 TABLET ORAL at 16:27

## 2022-07-09 RX ADMIN — CALCIUM CARBONATE 600 MG (1,500 MG)-VITAMIN D3 400 UNIT TABLET 1 TABLET: at 08:45

## 2022-07-09 RX ADMIN — MYCOPHENOLATE MOFETIL 1500 MG: 200 POWDER, FOR SUSPENSION ORAL at 20:50

## 2022-07-09 RX ADMIN — ONDANSETRON 4 MG: 2 INJECTION INTRAMUSCULAR; INTRAVENOUS at 20:51

## 2022-07-09 RX ADMIN — PREDNISONE 17.5 MG: 2.5 TABLET ORAL at 17:53

## 2022-07-09 ASSESSMENT — ACTIVITIES OF DAILY LIVING (ADL)
ADLS_ACUITY_SCORE: 32
ADLS_ACUITY_SCORE: 32
ADLS_ACUITY_SCORE: 34
ADLS_ACUITY_SCORE: 32
ADLS_ACUITY_SCORE: 32
ADLS_ACUITY_SCORE: 34
ADLS_ACUITY_SCORE: 36
ADLS_ACUITY_SCORE: 32
ADLS_ACUITY_SCORE: 36
ADLS_ACUITY_SCORE: 34

## 2022-07-09 NOTE — PLAN OF CARE
ICU End of Shift Summary. See flowsheets for vital signs and detailed assessment.    Changes this shift: No acute changes overnight. Trending ABGs, CVTS notified about elevated pCO2, no decline in patient's mentation, continue with 1 L via NC.    Neuro: No decline in mentation, A&Ox4; somnolent but alert; moves all extremities; makes needs know; PERRLA.    Cardiac: AFebrile; Required intermittent use of Levo to keep MAPs > 65; SR 70's; CVPs q4 hr    Resp: Trending ABGs, hypercarbic; 1L via NC    GI: Three BMs overnight, watery, brown; Zofran given with therapeutic response noted.    : Intermittent use of purewick; voided in bedpan once.    Labs: Electrolytes all within normal range.    Plan: Continue to trend ABGs; continue with bowel regimen; continue to monitor and notify CVTS with any acute changes.

## 2022-07-09 NOTE — PROGRESS NOTES
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 Sunshine. This was complicated by left upper extremity acute limb ischemia s/p left radial thrombectomy on 7/1/22. She remains critically ill requiring ventilatory and hemodynamic support.      PLAN SUMMARY:    S/p BSLT  - Immunosuppression and prophylaxis per pulmonary transplant team  - Restart prednisone 17.5 BID    Hypercarbic respiratory failure  - Trend ABG's  - Monitor mental status and work of breathing; no indication for re-intubation     Acute kidney injury 2/2 ischemic ATN; improving  - Holding off diuresis per nephrology.     Distributive shock; improving  - Wean off pressors as able; continues to require NE  - Midodrine 30 mg q8h  - Stop hydrocortisone stress dose   - Continue fludrocortisone     Stenotrophomonas Maltophilia   - Continue ceftazidime X 14d  - Vancomycin restarted 7/7 for coverage of strep pneumoniae        PLAN:  Neuro/ pain/ sedation:  Acute postoperative pain  - Monitor neurological status. Notify the MD for any acute changes in exam.  - Scheduled: acetaminophen, robaxin. PRN: oxycodone     Pulmonary:  #Postoperative ventilatory support  #Hypercarbic respiratory failure  #Bilateral Lung Transplant  #Hx COPD  - Titrate FiO2 for SpO2 >92%  - Pulmonary hygiene: Incentive spirometer every 15- 30 minutes when awake  - Basiliximab POD #0 and #4, prednisolone, mycophenolate, tacrolimus  - Valganciclovir       Cardiovascular:  #Hx HTN  #Hx HFpEF  - Monitor hemodynamic status.   - Goal MAP >65  - Norepinephrine drip, wean as tolerated  - Midodrine 30 q8h     GI care/ Nutrition:   - NJ TF @ goal  - PPI: protonix  - Continue bowel regimen: miralax; PRN moM    Renal/ Fluid Balance/ Electrolytes:   #BACILIO  - Strict I/O, daily weights  -  Avoid/limit nephrotoxins as able  - Replete lytes PRN per protocol     Endocrine:    Stress induced hyperglycemia  - High sliding scale insulin 7/2  - Lantus 35 units daily     ID/ Antibiotics:  Stress-induced leukocytosis  - Continue to monitor fever curve, WBC and inflammatory markers as appropriate  - Prophylaxis: POD #8-90 nystatin, TMP/SMX (discontined due to BACILIO), valganciclovir  - Post-op abx: ceftazadime extended to 14 day coverage given elevated temp and WBCs  - Post-op cultures:   - Gram stain (1+ G+ cocci)   - Resp aerobic cx (4+ G- bacilli)   - Fungal cx (NGTD)   - AFB cx (NGTD)  - Blood cx sent 6/30 negative  - Continue ceftazidime; added vancomycin on 7/7     Heme:     Acute blood loss anemia  Acute blood loss thrombocytopenia  Left upper extremity acute limb ischemia s/p left radial thrombectomy on 7/1/22  - Transitioned from heparin gtt to SQH per vascular surgery  - Daily CBC        Prophylaxis:    - DVT: mechanical +SQH  - PPI   - PT/OT     Lines/ tubes/ drains:  - CTs x5, RIJ, PIV x2, a-line  - 4 Pleural, 1 med     Disposition:  - CVICU    Patient seen, findings and plan discussed with CV ICU staff, Dr. Bolden.    Danuta Chairez MD  Anesthesiology Resident, PGY-4        ====================================    SUBJECTIVE:   NAEO    OBJECTIVE:   1. VITAL SIGNS:   Temp:  [97.3  F (36.3  C)-98.3  F (36.8  C)] 98.1  F (36.7  C)  Pulse:  [] 74  Resp:  [8-28] 16  BP: (111)/(74) 111/74  MAP:  [55 mmHg-127 mmHg] 65 mmHg  Arterial Line BP: ()/() 100/44  FiO2 (%):  [28 %-30 %] 30 %  SpO2:  [92 %-100 %] 98 %  FiO2 (%): 30 %  Resp: 16      2. INTAKE/ OUTPUT:   I/O last 3 completed shifts:  In: 1432.49 [I.V.:584.49; NG/GT:848]  Out: 2148 [Urine:1180; Chest Tube:968]    3. PHYSICAL EXAMINATION:   General: up in chair; follows commands  Neuro: grossly intact  Resp: on 1L NC  CV: RRR  Abdomen: Soft, Non-distended, Non-tender  Incisions: c/d/i  Extremities: warm and well perfused    4.  INVESTIGATIONS:   Arterial Blood Gases   Recent Labs   Lab 07/09/22 0401 07/08/22 2027 07/08/22  1446 07/08/22  0818   PH 7.29* 7.29* 7.25* 7.33*   PCO2 85* 86* 96* 82*   PO2 100 114* 131* 133*   HCO3 41* 41* 42* 43*     Complete Blood Count   Recent Labs   Lab 07/09/22 0401 07/08/22  1531 07/08/22 0818 07/08/22 0332   WBC 16.5*  16.5* 16.4* 17.5* 14.3*   HGB 7.9*  7.9* 8.3* 8.5* 8.3*     302 305 310 292     Basic Metabolic Panel  Recent Labs   Lab 07/09/22  1122 07/09/22 0930 07/09/22 0404 07/09/22 0401 07/08/22 1538 07/08/22 1531 07/08/22 0336 07/08/22 0332 07/08/22 0024 07/08/22 0022 07/07/22 2012 07/07/22  1545   NA  --   --   --  143  --  142  --  140  --   --   --  136   POTASSIUM  --   --   --  4.5  --  4.2  --  4.0  --  3.4   < > 3.3*   CHLORIDE  --   --   --  96*  --  94*  --  94*  --   --   --  91*   CO2  --   --   --  39*  --  42*  --  39*  --   --   --  35*   BUN  --   --   --  104.0*  --  105.0*  --  97.0*  --   --   --  99.6*   CR  --   --   --  1.96*  --  1.92*  --  1.77*  --   --   --  1.80*   * 148* 126* 125*   < > 132*   < > 168*   < >  --    < > 181*    < > = values in this interval not displayed.     Liver Function Tests  Recent Labs   Lab 07/07/22 0323 07/06/22 0337 07/04/22  0323   AST 23 24 20   ALT 24 22 24   ALKPHOS 63 59 60   BILITOTAL <0.2 <0.2 <0.2   ALBUMIN 2.8*  --  2.6*     Pancreatic Enzymes  Recent Labs   Lab 07/08/22  0332   LIPASE 17     Coagulation Profile  No lab results found in last 7 days.      5. RADIOLOGY:   Recent Results (from the past 24 hour(s))   XR Chest Port 1 View    Narrative    EXAM: XR CHEST PORT 1 VIEW  7/9/2022 6:39 AM     HISTORY:  chest tubes and R hydropneumothorax s/p bilateral lung  transplant       COMPARISON:  7/8/2022    FINDINGS  Technique: Semiupright AP view of the chest.     Devices: Right internal jugular approach central venous catheter  terminates over the central right innominate vein. Bilateral chest  tubes  unchanged. Clamshell sternotomy wires.    Stable perihilar and bibasilar pulmonary opacities. Cardiac silhouette  is stable in size. Aortic arch calcifications. Remaining small right  apical hydropneumothorax.      Impression    IMPRESSION:     1. Stable small right apical pneumothorax.  2. Stable perihilar/bibasilar pulmonary opacities, atelectasis versus  edema.  3. Stable support devices.    I have personally reviewed the examination and initial interpretation  and I agree with the findings.    JOHANN MORAES MD         SYSTEM ID:  [id number]       =========================================

## 2022-07-09 NOTE — PROGRESS NOTES
"Nephrology attending note:  This patient has been seen and evaluated by me, Maryam Arboleda MD on July 9, 2022. I have reviewed the history of present illness and the patient's clinical course. I personally evaluated, interviewed and examined the patient on the date of the encounter.       I reviewed the relevant labs, previous notes and imaging studies, and participated in the formulation of a diagnostic and treatment plan. This note reflects my direct involvement in the patient's care.     Key history:   62 female with hx of HTN, Hep C, osetopenia, previous polysubstance use (stopped 2019) and severe COPD who is s/p BSLT on 6/28/2022.  She developed BACILIO in setting of continued need for vasopressors thought to be due to vasoplegia.  Had radial artery thrombus requiring thrombectomy on 7/2 with no IV contrast used.       Key management decisions:   #1 acute kidney injury likely secondary to ischemic ATN; It seems she got another hit on 7/8 with a slight elevation in creatinine from 1.77-->1.98mg/dl. There has been some lowish BP readings yesterday. No other implicating element: no IV contrast, no new medications, off Bactrim, tacrolimus level within target. There has been some increase in her WBCS since 7/8 ? New infection? So this could be again hemodynamic.  She has been urinating well initially helped by diuretics.  -would hold on lasix/bumex today and allow for auto-diuresis.  -Please get a Urinanalysis       #2 acid-base status: She has acidemia with a pH of 7.29 which is mainly driven by respiratory acidosis post lung transplant/centrally mediated.  This is managed by the ICU team through the BiPAP.  I would avoid sodium bicarb at this stage with her new lung transplant; leading to more CO2 generation and poor clearance by the lungs .        Key exam findings:   /74 (BP Location: Right arm)   Pulse 75   Temp 98.3  F (36.8  C) (Axillary)   Resp 16   Ht 1.575 m (5' 2\")   Wt 74.8 kg (164 lb 14.5 oz)  "  SpO2 96%   BMI 30.16 kg/m    Gen: awake; lying flat  Lungs: symmetrical bilateral air entry anteriorly; mechanical breath sounds  Heart: RRR   Abdomen: positive bowel sounds, Soft , not tender  Lower extremities: +1 edema       Intake/Output Summary (Last 24 hours) at 7/8/2022 1412  Last data filed at 7/8/2022 1300  Gross per 24 hour   Intake 2015.76 ml   Output 3085 ml   Net -1069.24 ml         Electrolytes/Renal -   Recent Labs   Lab Test 07/09/22  0401 07/08/22  1531 07/08/22  0332 07/07/22  1545 07/07/22  0323    142 140   < > 136   POTASSIUM 4.5 4.2 4.0   < > 4.5   CO2 39* 42* 39*   < > 32*   CR 1.96* 1.92* 1.77*   < > 1.99*   ESTUARDO 8.4* 8.6* 8.4*   < > 8.1*   PHOS 6.2*  --  4.6*  --  5.6*    < > = values in this interval not displayed.       CBC -   Recent Labs   Lab Test 07/09/22  0401 07/08/22  1531 07/08/22  0818   WBC 16.5*  16.5* 16.4* 17.5*   HGB 7.9*  7.9* 8.3* 8.5*     302 305 310     Current Facility-Administered Medications   Medication     acetaminophen (TYLENOL) tablet 650 mg     acetaminophen (TYLENOL) tablet 975 mg     acetylcysteine (MUCOMYST) 20 % nebulizer solution 2 mL     aspirin (ASA) chewable tablet 81 mg     bisacodyl (DULCOLAX) suppository 10 mg     calcium carbonate (TUMS) chewable tablet 500-1,000 mg     calcium carbonate 600 mg-vitamin D 400 units (CALTRATE) per tablet 1 tablet     cefTAZidime (FORTAZ) 2 g vial to attach to  ml bag for ADULTS or NS 50 ml bag for PEDS     dextrose 10% infusion     glucose gel 15-30 g    Or     dextrose 50 % injection 25-50 mL    Or     glucagon injection 1 mg     fludrocortisone (FLORINEF) tablet 0.1 mg     heparin (porcine) Infusion 1000 units/1000 mL for A-line     heparin ANTICOAGULANT injection 5,000 Units     hydrocortisone sodium succinate PF (solu-CORTEF) injection 50 mg     hydrOXYzine (ATARAX) tablet 25 mg    Or     hydrOXYzine (ATARAX) tablet 50 mg     insulin aspart (NovoLOG) injection (RAPID ACTING)     [Held by  provider] insulin glargine (LANTUS PEN) injection 35 Units     lactated ringers BOLUS 500 mL     levalbuterol (XOPENEX) neb solution 1.25 mg     Lidocaine (LIDOCARE) 4 % Patch 2 patch     lidocaine (LMX4) cream     lidocaine 1 % 0.1-1 mL     lidocaine patch in PLACE     magnesium hydroxide (MILK OF MAGNESIA) suspension 30 mL     methocarbamol (ROBAXIN) tablet 500 mg     metoprolol (LOPRESSOR) injection 2.5 mg     midodrine (PROAMATINE) tablet 30 mg     multivitamins w/minerals liquid 15 mL     mycophenolate (GENERIC EQUIVALENT) capsule 1,500 mg    Or     mycophenolate (CELLCEPT BRAND) suspension 1,500 mg     naloxone (NARCAN) injection 0.2 mg    Or     naloxone (NARCAN) injection 0.4 mg    Or     naloxone (NARCAN) injection 0.2 mg    Or     naloxone (NARCAN) injection 0.4 mg     norepinephrine (LEVOPHED) 16 mg in  mL infusion MAX CONC CENTRAL LINE     nystatin (MYCOSTATIN) cream     nystatin (MYCOSTATIN) suspension 1,000,000 Units     ondansetron (ZOFRAN ODT) ODT tab 4 mg    Or     ondansetron (ZOFRAN) injection 4 mg     oxyCODONE (ROXICODONE) tablet 5 mg     pantoprazole (PROTONIX) 2 mg/mL suspension 40 mg     polyethylene glycol (MIRALAX) Packet 17 g     prochlorperazine (COMPAZINE) injection 10 mg    Or     prochlorperazine (COMPAZINE) tablet 10 mg     protein modular (PROSOURCE TF) 1 packet     senna-docusate (SENOKOT-S/PERICOLACE) 8.6-50 MG per tablet 2 tablet     simethicone (MYLICON) chewable tablet 80 mg     sodium chloride (PF) 0.9% PF flush 3 mL     sodium chloride (PF) 0.9% PF flush 3 mL     [Held by provider] sulfamethoxazole-trimethoprim (BACTRIM) 400-80 MG per tablet 1 tablet     tacrolimus (GENERIC EQUIVALENT) suspension 9 mg     thiamine (B-1) injection 100 mg     valGANciclovir (VALCYTE) solution 450 mg     vancomycin (VANCOCIN) 750 mg in sodium chloride 0.9 % 250 mL intermittent infusion     vasopressin 1 unit/mL MAX Conc (PITRESSIN) infusion      Please avoid placing a PICC line in any  patient with CKD III or above, BACILIO, or ESKD, as this will impact negatively the ability of the patient to have an AV fistula or AV Graft should they need it in the future.  A medline is the preferred type of access in patients with kidney disease. Thank you.    Maryam Arboleda MD   [facility]   Department of Medicine   Division of Renal Disease and Hypertension

## 2022-07-09 NOTE — PLAN OF CARE
ICU End of Shift Summary. See flowsheets for vital signs and detailed assessment.    Changes this shift: On nasal canula 1L SPO2 95-99, (tried removing oxygen multiple times, SPO2 decreases to 70's-80's).Hypercarbic respiratory failure PCO2 96-82 mentation has not changed, oriented x 4(does not know the day, but knows month and year.) up to the chair via sling x 2 hours tolerated well. Oxycodone 5mg given for generalized pain; since receiving oxy, pt have been lethargic.  Pt became more somnolent, therefore able to place on BiPAP 18/8 30% for slightly over a hour. Dong removed per teams request.  Weaned off levophed multiple times but restarted intermittently when pt is sleeping.    CVTS notified twice regarding hypercarbic no orders received nor call back.   Daughter at  today and updated.    Plan: continue to monitor and goals of care; notify CVGTS with any changes in status.        Goal Outcome Evaluation:  Ongoing, Not progressing       Problem: Pain (Lung Surgery)  Goal: Acceptable Pain Control  Outcome: Ongoing, Not Progressing     Problem: Postoperative Urinary Retention (Lung Surgery)  Goal: Effective Urinary Elimination  Outcome: Ongoing, Not Progressing

## 2022-07-09 NOTE — PLAN OF CARE
Changes this shift: Patient titrated off of levo, turned back on due to dropping MAP currently at 0.05. CVP 7. No changes in mental status, remains drowsy but alert and oriented. Regular diet initiated. Restarted tube feeds, back to goal. Patient continues to desat when oxygen is turned off. Currently on 1L Nasal cannula. Up to chair for afternoon. Oxy given for sling up to chair due to pain with transfer. Low UO but hard to assess due to incontinence.     Plan:  Continue to monitor mental status.

## 2022-07-09 NOTE — PROGRESS NOTES
Pulmonary Medicine  Cystic Fibrosis - Lung Transplant Team  Progress Note  2022     Patient: Sofie Rodriguez  MRN: [id number]  : 1960 (age 62 year old)  Transplant: 2022 (Lung), POD #11  Admission date: 2022    Assessment & Plan:   Sofie Rodriguez is a 62 year old female with a PMH significant for end stage COPD, HTN, Hep C, and osteopenia as well as former methamphetamine use.  Pt. is now s/p BSLT on 22.  Surgery on CPB, uncomplicated, lungs slightly undersized for chest. Did require some moderate volume resuscitation with low dose pressors post transplant. Extubated . Persistent CO2 retention, with limited improvement with BiPAP. Etiology is unclear but appears to be a resp drive problem. Persistent low dose pressor requirement of unclear etiology.  Left radial artery thrombus (presumed secondary to arterial line) with thrombectomy under local anesthesia and MAC on .     Recommendations today:   - Continue Bipap for CO2 retention/acidosis at night and as needed during the day depending on VBG results.  - Monitor VBGs.  - Diuresis per renal recommendations.  - Sputum airway cultures with Stenotrophomonas.   - Continue ceftaz for a total fo 14 days  - Continue coverage for strep pneumoniae with IV vanco  - Tacro steady state trough/peak levels on Monday (ordered), will need to call ICU RN at 9 AM to confirm peak levels are drawn.  - Prednisolone held while on stress dose hydrocortisone.  Reinitiated prednisolone today (2022) due to being off pressors.  - begin valcyte prophylaxis today   - Hold on bactrim prophylaxis  given elevated creatinine  - Unable to complete metabolic cart because of hypoxia   - study does not suggest over feeding     S/p bilateral sequential lung transplant (BSLT) for end stage COPD:  Persistent hypercapneic respiratory failure: No evidence of PGD post operatively, Extubated to 2L NC on .  Ongoing low dose pressor requirement.  Persistent CO2  retention but the patient is not very tachypneic/dyspneic so it appears to be a drive issue.   - Explant pathology with end stage emphysema as expected and all lymph nodes benign.  - 7/9 chest x-ray reviewed by me, clear lung fields with NJ/CVC and chest tubes in place.  - RQ study performed, it was done for only 6mins but data was not supportive of Overfeeding.  - Diaphragm assessment by US does not show dysfunction per CVICU  - Pulmonary toilet with chest physiotherapy QID (addition of Aerobika and incentive spirometry once extubated)  - DSA on 7/4 Neg) then one month post-transplant, repeat in one month.  - Ammonia monitoring every twice weekly  x 3 weeks (screening for hyperammonemia post-lung transplant). Ammonia 31 (7/7)  - Bronch 6/29 with patent airways, no significant ischemic reperfusion injury, no significant edema, occasional mucous plugs in lower lobes bilaterally but removed upon therapeutic suctioning.   - PVTS catheters placed 6/29 but removed on 7/2 due to the requirement for heparinization for radial artery thrombectomy.   - CO2 retention but oxygenating well and no evidence of resp distress. Likely reflects preop/chronic CO2 retention. May take some time to re-equilibrate. BiPAP at night and through the day as needed depending on VBG.  Minimize sedation. Reduce supplemental O2 to keep SaO2 93-96%. Monitor VBGs   - Nebs: levalbuterol and Mucomyst QID   - Chest tubes managed by surgical team  - Daily CXR  - BiPAP for hypercapnea. Stopped using it since 7/8/22. CO2 remains stable, will follow VBG daily.  - Post-pyloric feeding tube placed by GI   --tube feeds at goal        Immunosuppression:  Induction therapy with basiliximab (and high dose IV steroid) given intraoperatively, repeating basiliximab dose on POD#4.  - Tacrolimus goal tacrolimus level 8-12.   Date Tacro Level Intervention   6/29 <1.0 Continue current dose, 1 mg bid   6/30 3.0 Increase to 2 mg bid   7/1 6.9 No adjustment    7/2 9.1   continue current dose    7/3  8.6  switch to tacrolimus by feeding tube 4 mg twice daily starting tomorrow (7/4) morning   7/4 10.2 Switched to enteral today. Not steady state. Continue current dose.   7/5  7.7 Not steady state, continue current dose.   7/6 4.8   increased to 6 mg twice daily.    7/8  5.1 Increased to 9mg BID.   7/10   Peak/Trough levels ordered                   - MMF 1500 mg BID  - Methylprednisolone 125 mg x 3 doses completed. Prednisone held while on stress dose hydrocortisone. Currently off pressors agree with switching from stress dose steroids to prednisolone.  Date AM dose (mg) PM dose (mg)   7/7 15 15   7/14 15 12.5   7/21 12.5 12.5   8/4 12.5 10   8/18 10 10   9/15 10 7.5   10/13 7.5 7.5   11/10 7.5 5   12/8 5 5   1/5/23 5 2.5      Prophylaxis:   - Bactrim for PJP ppx, begin 7/7, every other day for elevated creatinine  - VGCV for CMV ppx (as below to start on 7/7)  - Nystatin for oral candidiasis ppx, 6 month course  - See below for serologies and viral ppx:    Donor Recipient Intervention   CMV status + + Valganciclovir POD #8-90   EBV status + + EBV check monthly   HSV status N/A + No Acyclovir prophylaxis      Hemodynamics: Persistent hypotension/pressor requirements.  Etiology unclear. - Echocardiogram (7/7/22) with normal ejection fraction. No valvular disease  Started fludrocortisone for hypotension and hyperkalemia 7/6  Trial of stress dose steroids starting 7/6  CT head 7/7 without intracranial pathology to explain hypotension.    BACILIO: Likely multifactorial including bactrim and hypotension. Unlikely that tacro has been playing a significant role since levels have generally been low.  Volume status is unclear.  Hypotension and rising creatinine suggests she is volume depleted but intake has generally been greater than output by greater than a liter each day and the patient does have some dependent edema.  - Diuresis per nephrology recommendations.  - Fludrocortisone for  hyperkalemia.  7/9/2022: Cr is stable to slowly improving.     Left hand ischemia:  Radial artery thrombosis identified on duplex Doppler.  Thrombectomy under local anesthesia and MAC successful on 7/2. Completed high intensity heparin.  Continue daily aspirin.    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   - AFB to be sent on all future bronchs, last on 6/29  - IgG at one month 7/29 6/30 blood culture negative to date  6/29 respiratory cultures negative to date  6/28 respiratory culture with Stenotrophomonas maltophilia and Streptococcus pneumoniae        Stenotrophomonas Maltophilia: Noted in right explanted lung washings.   -  Sensitivities reviewed. Stopped treatment bactrim. Continue ceftazidime X 14d  --vanco restarted 7/7 for coverage of strep pneumoniae, finish a 10day course.     H/O Hep C: C: Diagnosed in 1980s, 2 mos of treatment, quant negative since 10/2017, last positive 2/20/17 ([phone]).Glenis positive on 6/2021 with negative HCV PCR. Hx of remote ETOH abuse. MR Elastography 4/27/21 with hepatology review and consult without any concerns post transplant.   6/29 HIV RNA negative  Hepatitis B DNA negative  Hepatitis C RNA negative  Hepatitis C antibody positive (old)    History of steroid induced hyperglycemia: Well controlled in outpatient. Management by primary team currently on insulin gtt, may need endo consult prior to discharge.      We appreciate the excellent care provided by the CVTS and CVICU teams.  Recommendations communicated via in person rounding and this note.  Will continue to follow along closely, please do not hesitate to call with any questions or concerns.     Subjective & Interval History:     Patient is communicative cooperative with interview or exam. Followed some simple commands worked with PT/OT. Not on BiPAP on 7/8/22. She has a weak cough. CP continues to be bothersome. Was up in chair  "daily.    Review of Systems:     She is noticing pain in the legs and abd pain. The abd pain is diffuse and cramps.   Leg swelling is stable.      Physical Exam:     All notes, images, and labs from past 24 hours (at minimum) were reviewed.    Vital signs:  Temp: 98.1  F (36.7  C) Temp src: Axillary BP: 111/74 Pulse: 98   Resp: 9 SpO2: 96 % O2 Device: None (Room air) Oxygen Delivery: 1 LPM Height: 157.5 cm (5' 2\") Weight: 74.8 kg (164 lb 14.5 oz)  I/O:     Intake/Output Summary (Last 24 hours) at 7/9/2022 1438  Last data filed at 7/9/2022 1400  Gross per 24 hour   Intake 1342.55 ml   Output 1440 ml   Net -97.45 ml       Constitutional: Lying in bed, in no apparent distress.   HEENT: Eyes with pink conjunctivae, anicteric.  Oral mucosa moist without lesions.  Neck supple without lymphadenopathy.   PULM: Fair air flow bilaterally.  No crackles, no rhonchi, no wheezes.    CV: Normal S1 and S2.  Tachycardic RR.  ++ holosystolic murmur, + gallop, or rub.  + peripheral edema.   ABD: NABS, soft, mild diffusely tender, + distended.    MSK: Moves all extremities.  No apparent muscle wasting.   NEURO: somnolent, minimally conversant.   SKIN: Warm, dry.  No rash on limited exam.   PSYCH: calm.     Lines, Drains, and Devices:  Arterial Line 07/05/22 Brachial (Active)   Site Assessment WDL 07/07/22 1200   Line Status Pulsatile blood flow 07/07/22 1200   Arterine Line Cap Change Due 07/08/22 07/07/22 1200   Art Line Waveform Appropriate 07/07/22 1200   Art Line Interventions Leveled;Flushed per protocol;Connections checked and tightened 07/07/22 1200   Color/Movement/Sensation Capillary refill less than 3 sec 07/07/22 1200   Line Necessity Yes, meets criteria 07/07/22 1200   Dressing Type Transparent 07/07/22 1200   Dressing Status Clean, dry, intact 07/07/22 1200   Dressing Intervention Dressing changed/new dressing 07/05/22 1200   Dressing Change Due 07/10/22 07/07/22 1200   Number of days: 2       CVC Double Lumen Right " Internal jugular (Active)   Site Assessment WDL 07/07/22 1200   Dressing Type Chlorhexidine disk;Transparent 07/07/22 1200   Dressing Status clean;dry;intact 07/07/22 1200   Dressing Intervention dressing reinforced 07/04/22 0000   Dressing Change Due 07/10/22 07/07/22 1200   Tubing Change secondary tubing;primary tubing 06/29/22 0400   Line Necessity yes, meets criteria 07/07/22 1200   Brown - Status transduced;infusing 07/07/22 1200   Brown - Cap Change Due 07/08/22 07/07/22 1200   White - Status infusing 07/07/22 1200   White - Cap Change Due 07/08/22 07/07/22 1200   Phlebitis Scale 0-->no symptoms 07/07/22 1200   Infiltration? no 07/07/22 1200   Infiltration Scale 0 07/07/22 1200   Infiltration Site Treatment Method  None 07/07/22 0400   Was a vesicant infusing? no 07/07/22 0400   CVC Comment MAC capped 07/07/22 1200   Number of days: 9       Left Radial Interventional Procedure Access (Active)   Site Assessment Ecchymotic 07/07/22 1200   Hemostasis management Unchanged 07/07/22 1200   CMS Left Arm WDL 07/07/22 1200   Radial Pulse - Left Arm Present with doppler 07/07/22 1200   Number of days: 5     Data:     LABS    CMP:   Recent Labs   Lab 07/09/22  1122 07/09/22  0930 07/09/22  0404 07/09/22  0401 07/08/22  1538 07/08/22  1531 07/08/22  0336 07/08/22  0332 07/08/22  0024 07/08/22  0022 07/07/22 2012 07/07/22  1545 07/07/22  0406 07/07/22  0323 07/06/22  0405 07/06/22  0337 07/04/22  0327 07/04/22  0323   NA  --   --   --  143  --  142  --  140  --   --   --  136  --  136   < > 132*   < >  --    POTASSIUM  --   --   --  4.5  --  4.2  --  4.0  --  3.4   < > 3.3*  --  4.5   < > 5.2  5.5*   < >  --    CHLORIDE  --   --   --  96*  --  94*  --  94*  --   --   --  91*  --  90*   < > 98   < >  --    CO2  --   --   --  39*  --  42*  --  39*  --   --   --  35*  --  32*   < > 28   < >  --    ANIONGAP  --   --   --  8  --  6*  --  7  --   --   --  10  --  14   < > 6*   < >  --    * 148* 126* 125*   < > 132*   <  > 168*   < >  --    < > 181*   < > 183*   < > 244*   < >  --    BUN  --   --   --  104.0*  --  105.0*  --  97.0*  --   --   --  99.6*  --  94.8*   < > 82.7*   < >  --    CR  --   --   --  1.96*  --  1.92*  --  1.77*  --   --   --  1.80*  --  1.99*   < > 1.98*   < >  --    GFRESTIMATED  --   --   --  29*  --  29*  --  32*  --   --   --  32*  --  28*   < > 28*   < >  --    ESTUARDO  --   --   --  8.4*  --  8.6*  --  8.4*  --   --   --  8.3*  --  8.1*   < > 8.0*   < >  --    MAG  --   --   --  2.5*  --   --   --  2.5*  --   --   --   --   --  2.6*  --  2.9*   < >  --    PHOS  --   --   --  6.2*  --   --   --  4.6*  --   --   --   --   --  5.6*  --  3.5   < >  --    PROTTOTAL  --   --   --   --   --   --   --   --   --   --   --   --   --  4.6*  --  4.4*  --  4.4*   ALBUMIN  --   --   --   --   --   --   --   --   --   --   --   --   --  2.8*  --   --   --  2.6*   BILITOTAL  --   --   --   --   --   --   --   --   --   --   --   --   --  <0.2  --  <0.2  --  <0.2   ALKPHOS  --   --   --   --   --   --   --   --   --   --   --   --   --  63  --  59  --  60   AST  --   --   --   --   --   --   --   --   --   --   --   --   --  23  --  24  --  20   ALT  --   --   --   --   --   --   --   --   --   --   --   --   --  24  --  22  --  24    < > = values in this interval not displayed.     CBC:   Recent Labs   Lab 07/09/22  0401 07/08/22  1531 07/08/22  0818 07/08/22  0332   WBC 16.5*  16.5* 16.4* 17.5* 14.3*   RBC 2.60*  2.60* 2.70* 2.75* 2.70*   HGB 7.9*  7.9* 8.3* 8.5* 8.3*   HCT 26.5*  26.5* 27.2* 27.2* 26.8*   *  102* 101* 99 99   MCH 30.4  30.4 30.7 30.9 30.7   MCHC 29.8*  29.8* 30.5* 31.3* 31.0*   RDW 16.3*  16.3* 16.6* 16.4* 16.1*     302 305 310 292       INR: No lab results found in last 7 days.    Glucose:   Recent Labs   Lab 07/09/22  1122 07/09/22  0930 07/09/22  0404 07/09/22  0401 07/09/22  0021 07/08/22 2012   * 148* 126* 125* 120* 153*       Blood Gas:   Recent Labs   Lab  07/09/22  0401 07/08/22 2027 07/08/22  1446 07/05/22  1435 07/05/22  1207 07/05/22  0345 07/04/22  1944   PHV  --   --   --   --  7.20* 7.27* 7.24*   PCO2V  --   --   --   --  81* 74* 77*   PO2V  --   --   --   --  47 41 41   HCO3V  --   --   --   --  32* 34* 33*   LINDY  --   --   --   --  3.1* 6.2* 4.5*   O2PER 21 21 1   < > 4 25 25    < > = values in this interval not displayed.       Culture Data No results for input(s): CULT in the last 168 hours.    Virology Data:   Lab Results   Component Value Date    FLUAH1 Not Detected 06/29/2022    FLUAH3 Not Detected 06/29/2022    [id number] Not Detected 06/29/2022    IFLUB Not Detected 06/29/2022    RSVA Not Detected 06/29/2022    RSVB Not Detected 06/29/2022    PIV1 Not Detected 06/29/2022    PIV2 Not Detected 06/29/2022    PIV3 Not Detected 06/29/2022    HMPV Not Detected 06/29/2022       Historical CMV results (last 3 of prior testing):  No results found for: CMVQNT  No results found for: CMVLOG    Urine Studies    Recent Labs   Lab Test 07/08/22  0831 07/05/22  1004   URINEPH 5.5 5.5   NITRITE Negative Negative   LEUKEST Negative Negative   WBCU 1 5       Most Recent [facility] Pulmonary Function Testing (FVC/FEV1 only)  FVC-Pre   Date Value Ref Range Status   04/29/2022 1.82 L    11/11/2021 2.17 L    06/14/2021 2.00 L      FVC-%Pred-Pre   Date Value Ref Range Status   04/29/2022 58 %    11/11/2021 70 %    06/14/2021 64 %      FEV1-Pre   Date Value Ref Range Status   04/29/2022 0.51 L    11/11/2021 0.53 L    06/14/2021 0.54 L      FEV1-%Pred-Pre   Date Value Ref Range Status   04/29/2022 20 %    11/11/2021 21 %    06/14/2021 21 %        IMAGING    Recent Results (from the past 48 hour(s))   XR Chest Port 1 View    Narrative    EXAM: XR CHEST PORT 1 VIEW  7/6/2022 12:44 AM     HISTORY:  Post-Op Lung       COMPARISON:  7/5/2022    FINDINGS  Technique: Semiupright AP view of the chest.     Devices: Right internal jugular approach central venous catheter  terminates over the mid  SVC. Bilateral apical and basal chest tubes  are not appreciably changed. Clamshell sternotomy wires.    Unchanged streaky perihilar and bibasilar pulmonary opacities. Cardiac  silhouette is stable in size. Aortic arch calcifications. Remaining  trace right apical pneumothorax. No pleural effusion.       Impression    IMPRESSION:     1. Trace remaining right apical pneumothorax with bilateral chest  tubes in place.  2. Support devices stable.  3. Unchanged bilateral perihilar/basilar atelectasis/edema.    I have personally reviewed the examination and initial interpretation  and I agree with the findings.    GABRIELLA SNELL MD         SYSTEM ID:  [id number]   XR Chest Port 1 View    Narrative    Portable chest 7/6/2022 at 1306 hours    INDICATION: Post chest tube removal    COMPARISON: 0040 hours earlier today    FINDINGS: Interim bilateral thoracostomy tube removal. Clamshell  sternotomy from prior bilateral lung transplantation again present. No  conspicuous pneumothorax upon chest tube removal on either side. Right  basilar thoracostomy tube remains. Feeding tube beyond the inferior  margin of the image.      Impression    IMPRESSION: No conspicuous pneumothorax. Prior bilateral lung  transplant.    ALVINA HARRY MD         SYSTEM ID:  [id number]   XR Chest Port 1 View    Narrative    Exam: XR CHEST PORT 1 VIEW, 7/7/2022 5:47 AM    Indication: Post-Op Lung    Comparison: 7/6/2022    Findings:   Right IJ sheath over the SVC/innominate confluence. Feeding tube  courses below diaphragm with tip excluded from field-of-view.  Unchanged chest tubes and mediastinal drain.    Unchanged cardiac silhouette. Unchanged blunting of the left  hemidiaphragm. Unchanged small right hydropneumothorax. No new focal  pulmonary opacities.      Impression    Impression: Bilateral lung transplants with unchanged small right  hydropneumothorax.    I have personally reviewed the examination and initial interpretation  and I agree with  the findings.    JOHANN MORAES MD         SYSTEM ID:  [id number]   Echo Limited   Result Value    LVEF  55-60%    [facility]    [id number]  [id number]  [id number]  [id number]^MARIO ALBERTO^HERNAN^RAJ     [facility],[place]  Echocardiography Laboratory  [address] [place] [address]     Name: ALMAS CASIANO  MRN: [id number]  : 1960  Study Date: 2022 12:28 PM  Age: 62 yrs  Gender: Female  Patient Location: [facility] [place]  Reason For Study: Murmur  Ordering Physician: HERNAN LLANES  Performed By: Bonifacio Lin RDCS     BSA: 1.8 m2  Height: 62 in  Weight: 164 lb  HR: 86  BP: 115/54 mmHg  ______________________________________________________________________________  Procedure  Limited Portable Echo Adult.  ______________________________________________________________________________  Interpretation Summary     Global and regional left ventricular function is normal with an EF of 55-60%.  Global right ventricular function is normal.The right ventricle is normal  size.  No significant valvular abnormalities.  IVC diameter and respiratory changes fall into an intermediate range  suggesting an RA pressure of 8 mmHg.  No pericardial effusion is present.  This study was compared with the study from 7/3/22 there has been no change .  ______________________________________________________________________________  Left Ventricle  Left ventricular size is normal. Left ventricular wall thickness is normal.  Global and regional left ventricular function is normal with an EF of 55-60%.  No regional wall motion abnormalities are seen.     Right Ventricle  The right ventricle is normal size. Global right ventricular function is  normal.     Atria  Both atria appear normal.     Mitral Valve  The mitral valve is normal. Trace mitral insufficiency is present.     Aortic Valve  The aortic valve is tricuspid.     Tricuspid Valve  The peak velocity of the tricuspid regurgitant jet is not obtainable.  Pulmonary artery  systolic pressure cannot be assessed.     Pulmonic Valve  The valve leaflets are not well visualized. On Doppler interrogation, there is  no significant stenosis or regurgitation.     Vessels  IVC diameter and respiratory changes fall into an intermediate range  suggesting an RA pressure of 8 mmHg.     Pericardium  No pericardial effusion is present.     Compared to Previous Study  This study was compared with the study from 7/3/22 there has been no change .  ______________________________________________________________________________  MMode/2D Measurements & Calculations     IVSd: 1.2 cm  LVIDd: 4.4 cm  LVIDs: 2.3 cm  LVPWd: 1.2 cm  FS: 49.2 %  LV mass(C)d: 194.5 grams  LV mass(C)dI: 110.7 grams/m2  LA dimension: 4.0 cm  RWT: 0.55     ______________________________________________________________________________  Report approved by: Martínez Tanner 07/07/2022 01:33 PM         CT Chest w/o Contrast    Narrative    CT chest without contrast    Indication evaluate fluid collection. Recent one transplant 6/28    COMPARISON: Most recent available chest CT 11/11/2021    FINDINGS: Catheter from right side approach at the junction of the  right and left innominate veins at the upper margin of the SVC. Tubing  in the right axilla. Tube into the stomach progressing beyond the  inferior margin of the image. Trace right pleural effusion. Small left  pleural effusion. Cardiomegaly. No pericardial effusion. Small  mediastinal lymph nodes which may be reactive. Pericardial drain. Main  pulmonary artery mildly enlarged at 3.8 cm. Other tubing in the  abdomen which may be in a loop of bowel. Right thoracostomy tube.  Small amount of perihepatic ascites.  Bones show clamshell sternotomy from prior bilateral lung transplant.  Mild degenerative changes in the thoracic spine. There is contrast in  the stomach.  Right hydropneumothorax associated with the effusion. Chest wall  subcutaneous emphysema. Retrosternal air.  There is some narrowing of  the left sided anastomosis. Multiple anterior chest wall subcutaneous  emphysema. Air collection right chest wall anteriorly associated with  the pleura on the right. Please correlate for any sign of  bronchopleural fistula formation. Loculated fluid and air in the left  major fissure. No dominant pulmonary nodule. Other small  hydropneumothorax on the left.      Impression    IMPRESSION: Bilateral small hydropneumothoraces. Prior bilateral lung  transplant. Right chest wall abutting the right pleura, please  correlate for any evidence of bronchopleural fistula formation versus  recent postsurgical air. Enlarged pulmonary artery. Multiple support  devices. Cardiomegaly.    ALVINA HARRY MD         SYSTEM ID:  [id number]

## 2022-07-10 ENCOUNTER — APPOINTMENT (OUTPATIENT)
Dept: GENERAL RADIOLOGY | Facility: CLINIC | Age: 62
DRG: 007 | End: 2022-07-10
Attending: THORACIC SURGERY (CARDIOTHORACIC VASCULAR SURGERY)
Payer: MEDICARE

## 2022-07-10 LAB
ANION GAP SERPL CALCULATED.3IONS-SCNC: 6 MMOL/L (ref 7–15)
ANION GAP SERPL CALCULATED.3IONS-SCNC: 8 MMOL/L (ref 7–15)
BASE EXCESS BLDA CALC-SCNC: 18.1 MMOL/L (ref -9–1.8)
BUN SERPL-MCNC: 114 MG/DL (ref 8–23)
BUN SERPL-MCNC: 121 MG/DL (ref 8–23)
CALCIUM SERPL-MCNC: 8.4 MG/DL (ref 8.8–10.2)
CALCIUM SERPL-MCNC: 8.5 MG/DL (ref 8.8–10.2)
CHLORIDE SERPL-SCNC: 91 MMOL/L (ref 98–107)
CHLORIDE SERPL-SCNC: 91 MMOL/L (ref 98–107)
CREAT SERPL-MCNC: 2.39 MG/DL (ref 0.51–0.95)
CREAT SERPL-MCNC: 2.53 MG/DL (ref 0.51–0.95)
DEPRECATED HCO3 PLAS-SCNC: 39 MMOL/L (ref 22–29)
DEPRECATED HCO3 PLAS-SCNC: 40 MMOL/L (ref 22–29)
ERYTHROCYTE [DISTWIDTH] IN BLOOD BY AUTOMATED COUNT: 16.1 % (ref 10–15)
GFR SERPL CREATININE-BSD FRML MDRD: 21 ML/MIN/1.73M2
GFR SERPL CREATININE-BSD FRML MDRD: 23 ML/MIN/1.73M2
GLUCOSE BLDC GLUCOMTR-MCNC: 169 MG/DL (ref 70–99)
GLUCOSE BLDC GLUCOMTR-MCNC: 172 MG/DL (ref 70–99)
GLUCOSE BLDC GLUCOMTR-MCNC: 178 MG/DL (ref 70–99)
GLUCOSE BLDC GLUCOMTR-MCNC: 207 MG/DL (ref 70–99)
GLUCOSE BLDC GLUCOMTR-MCNC: 227 MG/DL (ref 70–99)
GLUCOSE BLDC GLUCOMTR-MCNC: 255 MG/DL (ref 70–99)
GLUCOSE SERPL-MCNC: 198 MG/DL (ref 70–99)
GLUCOSE SERPL-MCNC: 275 MG/DL (ref 70–99)
HCO3 BLD-SCNC: 45 MMOL/L (ref 21–28)
HCT VFR BLD AUTO: 26 % (ref 35–47)
HGB BLD-MCNC: 7.9 G/DL (ref 11.7–15.7)
LACTATE SERPL-SCNC: 0.4 MMOL/L (ref 0.7–2)
MAGNESIUM SERPL-MCNC: 2.9 MG/DL (ref 1.7–2.3)
MCH RBC QN AUTO: 30.9 PG (ref 26.5–33)
MCHC RBC AUTO-ENTMCNC: 30.4 G/DL (ref 31.5–36.5)
MCV RBC AUTO: 102 FL (ref 78–100)
O2/TOTAL GAS SETTING VFR VENT: 24 %
OXYHGB MFR BLD: 97 % (ref 92–100)
PCO2 BLD: 75 MM HG (ref 35–45)
PH BLD: 7.39 [PH] (ref 7.35–7.45)
PHOSPHATE SERPL-MCNC: 5.4 MG/DL (ref 2.5–4.5)
PLATELET # BLD AUTO: 345 10E3/UL (ref 150–450)
PO2 BLD: 132 MM HG (ref 80–105)
POTASSIUM SERPL-SCNC: 4.1 MMOL/L (ref 3.4–5.3)
POTASSIUM SERPL-SCNC: 4.2 MMOL/L (ref 3.4–5.3)
RBC # BLD AUTO: 2.56 10E6/UL (ref 3.8–5.2)
SODIUM SERPL-SCNC: 137 MMOL/L (ref 136–145)
SODIUM SERPL-SCNC: 138 MMOL/L (ref 136–145)
WBC # BLD AUTO: 17.9 10E3/UL (ref 4–11)

## 2022-07-10 PROCEDURE — 250N000013 HC RX MED GY IP 250 OP 250 PS 637: Performed by: SURGERY

## 2022-07-10 PROCEDURE — 250N000011 HC RX IP 250 OP 636: Performed by: SURGERY

## 2022-07-10 PROCEDURE — 83735 ASSAY OF MAGNESIUM: CPT | Performed by: SURGERY

## 2022-07-10 PROCEDURE — 99233 SBSQ HOSP IP/OBS HIGH 50: CPT | Mod: 24 | Performed by: INTERNAL MEDICINE

## 2022-07-10 PROCEDURE — 999N000157 HC STATISTIC RCP TIME EA 10 MIN

## 2022-07-10 PROCEDURE — 85014 HEMATOCRIT: CPT | Performed by: STUDENT IN AN ORGANIZED HEALTH CARE EDUCATION/TRAINING PROGRAM

## 2022-07-10 PROCEDURE — 250N000013 HC RX MED GY IP 250 OP 250 PS 637: Performed by: STUDENT IN AN ORGANIZED HEALTH CARE EDUCATION/TRAINING PROGRAM

## 2022-07-10 PROCEDURE — 94640 AIRWAY INHALATION TREATMENT: CPT

## 2022-07-10 PROCEDURE — 250N000011 HC RX IP 250 OP 636

## 2022-07-10 PROCEDURE — 250N000012 HC RX MED GY IP 250 OP 636 PS 637: Performed by: SURGERY

## 2022-07-10 PROCEDURE — 258N000003 HC RX IP 258 OP 636: Performed by: STUDENT IN AN ORGANIZED HEALTH CARE EDUCATION/TRAINING PROGRAM

## 2022-07-10 PROCEDURE — 250N000009 HC RX 250: Performed by: SURGERY

## 2022-07-10 PROCEDURE — 94668 MNPJ CHEST WALL SBSQ: CPT

## 2022-07-10 PROCEDURE — 80048 BASIC METABOLIC PNL TOTAL CA: CPT | Performed by: STUDENT IN AN ORGANIZED HEALTH CARE EDUCATION/TRAINING PROGRAM

## 2022-07-10 PROCEDURE — 71045 X-RAY EXAM CHEST 1 VIEW: CPT

## 2022-07-10 PROCEDURE — 83605 ASSAY OF LACTIC ACID: CPT | Performed by: ANESTHESIOLOGY

## 2022-07-10 PROCEDURE — 84100 ASSAY OF PHOSPHORUS: CPT | Performed by: SURGERY

## 2022-07-10 PROCEDURE — 250N000012 HC RX MED GY IP 250 OP 636 PS 637: Performed by: INTERNAL MEDICINE

## 2022-07-10 PROCEDURE — 82310 ASSAY OF CALCIUM: CPT | Performed by: STUDENT IN AN ORGANIZED HEALTH CARE EDUCATION/TRAINING PROGRAM

## 2022-07-10 PROCEDURE — 94640 AIRWAY INHALATION TREATMENT: CPT | Mod: 76

## 2022-07-10 PROCEDURE — 99233 SBSQ HOSP IP/OBS HIGH 50: CPT | Mod: 24 | Performed by: STUDENT IN AN ORGANIZED HEALTH CARE EDUCATION/TRAINING PROGRAM

## 2022-07-10 PROCEDURE — 250N000012 HC RX MED GY IP 250 OP 636 PS 637: Performed by: STUDENT IN AN ORGANIZED HEALTH CARE EDUCATION/TRAINING PROGRAM

## 2022-07-10 PROCEDURE — 250N000011 HC RX IP 250 OP 636: Performed by: THORACIC SURGERY (CARDIOTHORACIC VASCULAR SURGERY)

## 2022-07-10 PROCEDURE — 250N000011 HC RX IP 250 OP 636: Performed by: STUDENT IN AN ORGANIZED HEALTH CARE EDUCATION/TRAINING PROGRAM

## 2022-07-10 PROCEDURE — 250N000011 HC RX IP 250 OP 636: Performed by: INTERNAL MEDICINE

## 2022-07-10 PROCEDURE — 82805 BLOOD GASES W/O2 SATURATION: CPT | Performed by: STUDENT IN AN ORGANIZED HEALTH CARE EDUCATION/TRAINING PROGRAM

## 2022-07-10 PROCEDURE — 71045 X-RAY EXAM CHEST 1 VIEW: CPT | Mod: 26 | Performed by: RADIOLOGY

## 2022-07-10 PROCEDURE — 999N000015 HC STATISTIC ARTERIAL MONITORING DAILY

## 2022-07-10 PROCEDURE — 200N000002 HC R&B ICU UMMC

## 2022-07-10 PROCEDURE — 250N000013 HC RX MED GY IP 250 OP 250 PS 637

## 2022-07-10 RX ORDER — LEVOFLOXACIN 5 MG/ML
500 INJECTION, SOLUTION INTRAVENOUS
Status: COMPLETED | OUTPATIENT
Start: 2022-07-10 | End: 2022-07-12

## 2022-07-10 RX ORDER — SODIUM CHLORIDE 9 MG/ML
INJECTION, SOLUTION INTRAVENOUS CONTINUOUS
Status: ACTIVE | OUTPATIENT
Start: 2022-07-10 | End: 2022-07-11

## 2022-07-10 RX ORDER — LACTOBACILLUS RHAMNOSUS GG 10B CELL
1 CAPSULE ORAL 2 TIMES DAILY
Status: DISCONTINUED | OUTPATIENT
Start: 2022-07-10 | End: 2022-07-25

## 2022-07-10 RX ORDER — MYCOPHENOLATE MOFETIL 200 MG/ML
1000 POWDER, FOR SUSPENSION ORAL 2 TIMES DAILY
Status: DISCONTINUED | OUTPATIENT
Start: 2022-07-10 | End: 2022-07-12

## 2022-07-10 RX ORDER — MYCOPHENOLATE MOFETIL 250 MG/1
1000 CAPSULE ORAL 2 TIMES DAILY
Status: DISCONTINUED | OUTPATIENT
Start: 2022-07-10 | End: 2022-07-12

## 2022-07-10 RX ADMIN — INSULIN ASPART 5 UNITS: 100 INJECTION, SOLUTION INTRAVENOUS; SUBCUTANEOUS at 15:56

## 2022-07-10 RX ADMIN — NYSTATIN 1000000 UNITS: 100000 SUSPENSION ORAL at 11:23

## 2022-07-10 RX ADMIN — Medication 40 MG: at 07:56

## 2022-07-10 RX ADMIN — ASPIRIN 81 MG CHEWABLE TABLET 81 MG: 81 TABLET CHEWABLE at 07:54

## 2022-07-10 RX ADMIN — LEVOFLOXACIN 500 MG: 5 INJECTION, SOLUTION INTRAVENOUS at 10:14

## 2022-07-10 RX ADMIN — TACROLIMUS 9 MG: 5 CAPSULE ORAL at 18:23

## 2022-07-10 RX ADMIN — FLUDROCORTISONE ACETATE 0.1 MG: 0.1 TABLET ORAL at 07:55

## 2022-07-10 RX ADMIN — HEPARIN SODIUM 5000 UNITS: 5000 INJECTION, SOLUTION INTRAVENOUS; SUBCUTANEOUS at 06:36

## 2022-07-10 RX ADMIN — INSULIN ASPART 2 UNITS: 100 INJECTION, SOLUTION INTRAVENOUS; SUBCUTANEOUS at 04:13

## 2022-07-10 RX ADMIN — OXYCODONE HYDROCHLORIDE 5 MG: 5 TABLET ORAL at 12:46

## 2022-07-10 RX ADMIN — Medication 1 CAPSULE: at 20:48

## 2022-07-10 RX ADMIN — TACROLIMUS 9 MG: 5 CAPSULE ORAL at 08:03

## 2022-07-10 RX ADMIN — HEPARIN SODIUM 5000 UNITS: 5000 INJECTION, SOLUTION INTRAVENOUS; SUBCUTANEOUS at 13:57

## 2022-07-10 RX ADMIN — HEPARIN SODIUM 5000 UNITS: 5000 INJECTION, SOLUTION INTRAVENOUS; SUBCUTANEOUS at 22:09

## 2022-07-10 RX ADMIN — LIDOCAINE PATCH 4% 2 PATCH: 40 PATCH TOPICAL at 00:22

## 2022-07-10 RX ADMIN — INSULIN ASPART 3 UNITS: 100 INJECTION, SOLUTION INTRAVENOUS; SUBCUTANEOUS at 01:02

## 2022-07-10 RX ADMIN — CALCIUM CARBONATE 600 MG (1,500 MG)-VITAMIN D3 400 UNIT TABLET 1 TABLET: at 18:23

## 2022-07-10 RX ADMIN — PREDNISONE 17.5 MG: 2.5 TABLET ORAL at 18:23

## 2022-07-10 RX ADMIN — ACETYLCYSTEINE 2 ML: 200 SOLUTION ORAL; RESPIRATORY (INHALATION) at 20:29

## 2022-07-10 RX ADMIN — LEVALBUTEROL HYDROCHLORIDE 1.25 MG: 1.25 SOLUTION RESPIRATORY (INHALATION) at 17:04

## 2022-07-10 RX ADMIN — NYSTATIN 1000000 UNITS: 100000 SUSPENSION ORAL at 20:48

## 2022-07-10 RX ADMIN — MYCOPHENOLATE MOFETIL 1500 MG: 200 POWDER, FOR SUSPENSION ORAL at 07:58

## 2022-07-10 RX ADMIN — INSULIN ASPART 4 UNITS: 100 INJECTION, SOLUTION INTRAVENOUS; SUBCUTANEOUS at 20:49

## 2022-07-10 RX ADMIN — INSULIN ASPART 2 UNITS: 100 INJECTION, SOLUTION INTRAVENOUS; SUBCUTANEOUS at 07:59

## 2022-07-10 RX ADMIN — LEVALBUTEROL HYDROCHLORIDE 1.25 MG: 1.25 SOLUTION RESPIRATORY (INHALATION) at 08:43

## 2022-07-10 RX ADMIN — Medication 1 PACKET: at 07:57

## 2022-07-10 RX ADMIN — NYSTATIN 1000000 UNITS: 100000 SUSPENSION ORAL at 07:56

## 2022-07-10 RX ADMIN — INSULIN ASPART 2 UNITS: 100 INJECTION, SOLUTION INTRAVENOUS; SUBCUTANEOUS at 11:29

## 2022-07-10 RX ADMIN — OXYCODONE HYDROCHLORIDE 5 MG: 5 TABLET ORAL at 07:11

## 2022-07-10 RX ADMIN — MIDODRINE HYDROCHLORIDE 30 MG: 5 TABLET ORAL at 00:23

## 2022-07-10 RX ADMIN — ACETYLCYSTEINE 2 ML: 200 SOLUTION ORAL; RESPIRATORY (INHALATION) at 08:43

## 2022-07-10 RX ADMIN — ONDANSETRON 4 MG: 2 INJECTION INTRAMUSCULAR; INTRAVENOUS at 07:38

## 2022-07-10 RX ADMIN — MIDODRINE HYDROCHLORIDE 30 MG: 5 TABLET ORAL at 07:53

## 2022-07-10 RX ADMIN — MYCOPHENOLATE MOFETIL 1000 MG: 200 POWDER, FOR SUSPENSION ORAL at 20:49

## 2022-07-10 RX ADMIN — MIDODRINE HYDROCHLORIDE 30 MG: 5 TABLET ORAL at 15:56

## 2022-07-10 RX ADMIN — PROCHLORPERAZINE EDISYLATE 10 MG: 5 INJECTION INTRAMUSCULAR; INTRAVENOUS at 08:24

## 2022-07-10 RX ADMIN — SODIUM CHLORIDE: 9 INJECTION, SOLUTION INTRAVENOUS at 18:29

## 2022-07-10 RX ADMIN — LEVALBUTEROL HYDROCHLORIDE 1.25 MG: 1.25 SOLUTION RESPIRATORY (INHALATION) at 13:23

## 2022-07-10 RX ADMIN — MULTIVITAMIN 15 ML: LIQUID ORAL at 07:56

## 2022-07-10 RX ADMIN — ACETYLCYSTEINE 2 ML: 200 SOLUTION ORAL; RESPIRATORY (INHALATION) at 17:04

## 2022-07-10 RX ADMIN — ACETAMINOPHEN 975 MG: 325 TABLET, FILM COATED ORAL at 11:20

## 2022-07-10 RX ADMIN — OXYCODONE HYDROCHLORIDE 5 MG: 5 TABLET ORAL at 16:30

## 2022-07-10 RX ADMIN — ACETAMINOPHEN 975 MG: 325 TABLET, FILM COATED ORAL at 20:48

## 2022-07-10 RX ADMIN — CALCIUM CARBONATE 600 MG (1,500 MG)-VITAMIN D3 400 UNIT TABLET 1 TABLET: at 07:55

## 2022-07-10 RX ADMIN — NYSTATIN 1000000 UNITS: 100000 SUSPENSION ORAL at 15:57

## 2022-07-10 RX ADMIN — CEFTAZIDIME 2 G: 2 INJECTION, POWDER, FOR SOLUTION INTRAVENOUS at 07:57

## 2022-07-10 RX ADMIN — PREDNISONE 17.5 MG: 2.5 TABLET ORAL at 07:55

## 2022-07-10 RX ADMIN — SENNOSIDES AND DOCUSATE SODIUM 2 TABLET: 8.6; 5 TABLET ORAL at 07:54

## 2022-07-10 RX ADMIN — POLYETHYLENE GLYCOL 3350 17 G: 17 POWDER, FOR SOLUTION ORAL at 07:56

## 2022-07-10 RX ADMIN — LEVALBUTEROL HYDROCHLORIDE 1.25 MG: 1.25 SOLUTION RESPIRATORY (INHALATION) at 20:29

## 2022-07-10 RX ADMIN — Medication 1 CAPSULE: at 10:14

## 2022-07-10 RX ADMIN — NYSTATIN: 100000 CREAM TOPICAL at 07:57

## 2022-07-10 RX ADMIN — ACETYLCYSTEINE 2 ML: 200 SOLUTION ORAL; RESPIRATORY (INHALATION) at 13:22

## 2022-07-10 RX ADMIN — THIAMINE HYDROCHLORIDE 100 MG: 100 INJECTION, SOLUTION INTRAMUSCULAR; INTRAVENOUS at 11:19

## 2022-07-10 RX ADMIN — ACETAMINOPHEN 975 MG: 325 TABLET, FILM COATED ORAL at 04:12

## 2022-07-10 ASSESSMENT — ACTIVITIES OF DAILY LIVING (ADL)
ADLS_ACUITY_SCORE: 32
ADLS_ACUITY_SCORE: 34
ADLS_ACUITY_SCORE: 32
ADLS_ACUITY_SCORE: 34
ADLS_ACUITY_SCORE: 34

## 2022-07-10 NOTE — PLAN OF CARE
ICU End of Shift Summary. See flowsheets for vital signs and detailed assessment.    Changes this shift: Pt compaining of abdominal pain and nausea oxy given, zofran given - nausea continued - compazine given. Team alerted to abdominal discomfort and pressure. Probiotic started, team adjusting dose of Cellcept, senna/miralax held. Pt is having very frequent Bms. No UO noted although pt is incontinent. Bladder scanned. This evening, Nephrology requested a montgomery placement and intraabominal pressure monitoring. Montgomery placed. MAP goal changed to 55. Pt up to chair. No Levo needed. Family updated at bedside this afternoon.       Plan: Start intraabdominal pressure monitoring tonight. Continue to monitor and notify team of any changes.               Problem: Bleeding (Lung Surgery)  Goal: Absence of Bleeding  Outcome: Ongoing, Progressing     Problem: Bowel Motility Impaired (Lung Surgery)  Goal: Effective Bowel Elimination  Outcome: Ongoing, Progressing     Problem: Fluid and Electrolyte Imbalance (Lung Surgery)  Goal: Fluid and Electrolyte Balance  Outcome: Ongoing, Progressing     Problem: Hemodynamic Instability (Lung Surgery)  Goal: Effective Tissue Perfusion  Outcome: Ongoing, Progressing     Problem: Infection (Lung Surgery)  Goal: Absence of Infection Signs and Symptoms  Outcome: Ongoing, Progressing     Problem: Ongoing Anesthesia Effects (Lung Surgery)  Goal: Anesthesia/Sedation Recovery  Outcome: Ongoing, Progressing     Problem: Pain (Lung Surgery)  Goal: Acceptable Pain Control  Outcome: Ongoing, Progressing  Intervention: Prevent or Manage Pain  Recent Flowsheet Documentation  Taken 7/10/2022 1715 by Cherie Mosquera, RN  Pain Management Interventions: medication (see MAR)     Problem: Postoperative Nausea and Vomiting (Lung Surgery)  Goal: Nausea and Vomiting Relief  Outcome: Ongoing, Progressing     Problem: Postoperative Urinary Retention (Lung Surgery)  Goal: Effective Urinary Elimination  Outcome:  "Ongoing, Progressing     Problem: Respiratory Compromise (Lung Surgery)  Goal: Effective Oxygenation and Ventilation  Outcome: Ongoing, Progressing  Intervention: Optimize Oxygenation and Ventilation  Recent Flowsheet Documentation  Taken 7/10/2022 1200 by Cherie Mosquera RN  Administration (IS): self-administered  Number of Repetitions (IS): 5  Taken 7/10/2022 1000 by Cherie Mosquera, RN  Head of Bed (HOB) Positioning: HOB at 30 degrees     Problem: Plan of Care - These are the overarching goals to be used throughout the patient stay.    Goal: Plan of Care Review/Shift Note  Description: The Plan of Care Review/Shift note should be completed every shift.  The Outcome Evaluation is a brief statement about your assessment that the patient is improving, declining, or no change.  This information will be displayed automatically on your shift note.  Outcome: Ongoing, Progressing  Goal: Patient-Specific Goal (Individualized)  Description: You can add care plan individualizations to a care plan. Examples of Individualization might be:  \"Parent requests to be called daily at 9am for status\", \"I have a hard time hearing out of my right ear\", or \"Do not touch me to wake me up as it startles me\".  Outcome: Ongoing, Progressing  Goal: Absence of Hospital-Acquired Illness or Injury  Outcome: Ongoing, Progressing  Intervention: Prevent Skin Injury  Recent Flowsheet Documentation  Taken 7/10/2022 1500 by Cherie Mosquera RN  Body Position:   side-lying   lower extremity elevated   upper extremity elevated   turned   right  Taken 7/10/2022 1000 by Cherie Mosquera RN  Body Position:   turned   side-lying   left   lower extremity elevated   upper extremity elevated  Intervention: Prevent and Manage VTE (Venous Thromboembolism) Risk  Recent Flowsheet Documentation  Taken 7/10/2022 1200 by Cherie Mosquera, RN  Activity Management:   activity adjusted per tolerance   up in chair  Goal: Optimal Comfort and Wellbeing  Outcome: Ongoing, " Progressing  Intervention: Monitor Pain and Promote Comfort  Recent Flowsheet Documentation  Taken 7/10/2022 1715 by Cherie Mosquera, RN  Pain Management Interventions: medication (see MAR)  Goal: Readiness for Transition of Care  Outcome: Ongoing, Progressing     Problem: Risk for Delirium  Goal: Optimal Coping  Outcome: Ongoing, Progressing  Goal: Improved Behavioral Control  Outcome: Ongoing, Progressing  Goal: Improved Attention and Thought Clarity  Outcome: Ongoing, Progressing  Goal: Improved Sleep  Outcome: Ongoing, Progressing     Problem: Oral Intake Inadequate  Goal: Improved Oral Intake  Outcome: Ongoing, Progressing     Problem: Pain Acute  Goal: Acceptable Pain Control and Functional Ability  Outcome: Ongoing, Progressing  Intervention: Develop Pain Management Plan  Recent Flowsheet Documentation  Taken 7/10/2022 1715 by Cherie Mosquera, RN  Pain Management Interventions: medication (see MAR)     Problem: COPD (Chronic Obstructive Pulmonary Disease) Comorbidity  Goal: Maintenance of COPD Symptom Control  Outcome: Ongoing, Progressing     Problem: Heart Failure Comorbidity  Goal: Maintenance of Heart Failure Symptom Control  Outcome: Ongoing, Progressing     Problem: Hypertension Comorbidity  Goal: Blood Pressure in Desired Range  Outcome: Ongoing, Progressing         Goal Outcome Evaluation:

## 2022-07-10 NOTE — PLAN OF CARE
ICU End of Shift Summary. See flowsheets for vital signs and detailed assessment.    Changes this shift: Heart rate increased throughout shift. Required levo, especially during rest. Continues to require 1 lpm NC or desats into low 80's without it.. Multiple loose bowel movements, so bowel regiment held. Continent, but has urgency and increased abdominal discomfort prior to stooling. Significant pain to left forearm/wrist around prior radial thrombectomy.     Plan: Continue to assess mental status in setting of CO2 retention. Up to chair as tolerated.

## 2022-07-10 NOTE — PHARMACY-VANCOMYCIN DOSING SERVICE
Pharmacy Vancomycin Note  Date of Service 2022  Patient's  1960   62 year old, female    Indication: Postoperative Infection  Day of Therapy: 3  Current vancomycin regimen:  750 mg IV q24h  Current vancomycin monitoring method: AUC  Current vancomycin therapeutic monitoring goal: 400-600 mg*h/L    [employer] Prediction of Current Vancomycin Regimen  Loading dose: N/A  Regimen: 750 mg IV every 24 hours.  Start time: 20:38 on 2022  Exposure target: AUC24 (range)400-600 mg/L.hr   AUC24,ss: 1048 mg/L.hr  Probability of AUC24 > 400: 100 %  Ctrough,ss: 37.6 mg/L  Probability of Ctrough,ss > 20: 100 %  Probability of nephrotoxicity (Lodise IVAN ): 64 %      Current estimated CrCl = Estimated Creatinine Clearance: 26 mL/min (A) (based on SCr of 2.18 mg/dL (H)).    Creatinine for last 3 days  2022:  8:13 PM Creatinine 2.01 mg/dL  2022: 12:03 AM Creatinine 1.98 mg/dL;  3:23 AM Creatinine 1.99 mg/dL;  3:45 PM Creatinine 1.80 mg/dL  2022:  3:32 AM Creatinine 1.77 mg/dL;  3:31 PM Creatinine 1.92 mg/dL  2022:  4:01 AM Creatinine 1.96 mg/dL;  5:28 PM Creatinine 2.18 mg/dL    Recent Vancomycin Levels (past 3 days)  2022:  5:28 PM Vancomycin 34.3 ug/mL (24h)    Vancomycin IV Administrations (past 72 hours)                   vancomycin (VANCOCIN) 750 mg in sodium chloride 0.9 % 250 mL intermittent infusion (mg) 750 mg New Bag 22 1720    vancomycin 1500 mg in 0.9% NaCl 250 ml intermittent infusion 1,500 mg (mg) 1,500 mg New Bag 22 1839                Nephrotoxins and other renal medications (From now, onward)    Start     Dose/Rate Route Frequency Ordered Stop    07/10/22 1100  vancomycin (VANCOCIN) 600 mg in sodium chloride 0.9 % 250 mL intermittent infusion         600 mg  over 60-90 Minutes Intravenous ONCE 22 19422 1943  vancomycin place blake - receiving intermittent dosing         1 each Intravenous SEE ADMIN INSTRUCTIONS 22 1943      22 1800   tacrolimus (GENERIC EQUIVALENT) suspension 9 mg         9 mg Oral 2 TIMES DAILY. 07/08/22 1453      06/29/22 0300  norepinephrine (LEVOPHED) 16 mg in  mL infusion MAX CONC CENTRAL LINE         0.01-0.6 mcg/kg/min × 65.7 kg  0.6-37 mL/hr  Intravenous CONTINUOUS 06/29/22 0256               Contrast Orders - past 72 hours (72h ago, onward)    None          Interpretation of levels and current regimen:  Vancomycin level is reflective of AUC greater than 600    Has serum creatinine changed greater than 50% in last 72 hours: No but worsening    Urine output:  diminished urine output    Renal Function: Worsening    [employer] Prediction of Planned New Vancomycin Regimen  N/A    Plan:  1. Change to intermittent dosing. Will give 600 mg IV x1 on 7/10 @ 1100. Per [employer], trough should be ~20 at at that time and would be appropriate for redosing  2. Vancomycin monitoring method: Trough (Method 2 = manual dose calculation)  3. Vancomycin therapeutic monitoring goal: 15-20 mg/L  4. Pharmacy will check vancomycin levels as appropriate in 1-3 Days.  5. Serum creatinine levels will be ordered daily for the first week of therapy and at least twice weekly for subsequent weeks.    Kalpana Harrison, [facility]

## 2022-07-10 NOTE — PROGRESS NOTES
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 Sunshine. This was complicated by left upper extremity acute limb ischemia s/p left radial thrombectomy on 7/1/22. She remains critically ill requiring  hemodynamic support.      PLAN SUMMARY 7/10:  - Discontinue vancomycin / ceftazidime  - Start Levaquin 500mg q48h (renal dosing)  - Wean off pressors as able  - MAP goal > 55 (dips in PM during sleep)  - Start probiotic, simethicone  - Cellcept to 1000 mg BID  - Continue tube feeds  - Hold senna/miralax      PLAN:  Neuro/ pain/ sedation:  Acute postoperative pain  - Monitor neurological status. Notify the MD for any acute changes in exam.  - Scheduled: acetaminophen, robaxin. PRN: oxycodone      Pulmonary:  #Postoperative ventilatory support  #Hypercarbic respiratory failure, improving   #Bilateral Lung Transplant  #Hx COPD  - Titrate FiO2 for SpO2 >92%  - Pulmonary hygiene: Incentive spirometer w/ flutter valve every 15- 30 minutes when awake  - Basiliximab POD #0 and #4, prednisolone, mycophenolate, tacrolimus  - Valganciclovir  - Prednisone 17.5 BID  - Following ABG       Cardiovascular:  #Hx HTN  #Hx HFpEF  #Distributive shock; improving  - Wean off pressors as able   - Norepi gtt  - Midodrine 30 mg q8h  - Continue fludrocortisone  - Monitor hemodynamic status.   - Goal MAP >55     GI care/ Nutrition:   # Abdominal Pain  - Recent  KUB negative other than stool burden; frequent loose stools  - Start probiotic, simethacone  - NJ TF @ goal  - PPI: protonix  - Hold bowel regimen: miralax; PRN moM  - Consider Cdiff if no improvement     Renal/ Fluid Balance/ Electrolytes:   # Acute kidney injury 2/2 ischemic ATN; improving  - Strict I/O, daily weights  - Avoid/limit nephrotoxins as able  - Replete lytes PRN per  protocol     Endocrine:    # Stress induced hyperglycemia  - High sliding scale insulin 7/2  - Lantus 35 units daily     ID/ Antibiotics:  # Stress-induced leukocytosis  # Stenotrophomonas Maltophilia   - Continue to monitor fever curve, WBC and inflammatory markers as appropriate  - Prophylaxis: POD #8-90 nystatin, TMP/SMX (discontined due to BACILIO), valganciclovir  - Post-op abx: ceftazadime extended to 14 day coverage given elevated temp and WBCs  - Post-op cultures:   - Gram stain (1+ G+ cocci)   - Resp aerobic cx (4+ G- bacilli)   - Fungal cx (NGTD)   - AFB cx (NGTD)  - Stop: Ceftazidime (stenotrophomonas) and Vancomycin for strep pneumo (7/7-7/9)  - Start Levaquin 500mg q48h (renal dosing)      Heme:     #Acute blood loss anemia  #Left upper extremity acute limb ischemia s/p left radial thrombectomy on 7/1/22  - Transitioned from heparin gtt to SQH per vascular surgery  - Daily CBC  - Hgb 7.9 7/10        Prophylaxis:    - DVT: mechanical +SQH  - PPI   - PT/OT     Lines/ tubes/ drains:  - CTs x5, RIJ, PIV x2, a-line (discontinue if off BiPAP over next 24h)  - 4 Pleural, 1 med     Disposition:  - CVICU    Patient seen, findings and plan discussed with CV ICU staff, Dr. Bolden.    Dominik Sesay MD  Anesthesiology, PGY3  [facility] CV ICU     ====================================    SUBJECTIVE:   NAEON. Weaning Norepinephrine. Abdominal pain overnight.     OBJECTIVE:   1. VITAL SIGNS:   Temp:  [97.8  F (36.6  C)-98.3  F (36.8  C)] 98  F (36.7  C)  Pulse:  [] 98  Resp:  [0-30] 16  MAP:  [54 mmHg-91 mmHg] 78 mmHg  Arterial Line BP: ()/(34-60) 121/52  SpO2:  [86 %-100 %] 94 %  Resp: 16      2. INTAKE/ OUTPUT:   I/O last 3 completed shifts:  In: 1616.37 [P.O.:300; I.V.:341.37; NG/GT:760]  Out: 1260 [Urine:500; Chest Tube:760]    3. PHYSICAL EXAMINATION:   General: up in chair; follows commands  Neuro: grossly intact  Resp: on 1L NC  CV: RRR  Abdomen: Soft, mild tenderness  Incisions: c/d/i  Extremities: warm and  well perfused    4. INVESTIGATIONS:   Arterial Blood Gases   Recent Labs   Lab 07/09/22 0401 07/08/22 2027 07/08/22  1446 07/08/22  0818   PH 7.29* 7.29* 7.25* 7.33*   PCO2 85* 86* 96* 82*   PO2 100 114* 131* 133*   HCO3 41* 41* 42* 43*     Complete Blood Count   Recent Labs   Lab 07/10/22  0427 07/09/22  1728 07/09/22  0401 07/08/22  1531   WBC 17.9* 19.1* 16.5*  16.5* 16.4*   HGB 7.9* 8.4* 7.9*  7.9* 8.3*    339 302  302 305     Basic Metabolic Panel  Recent Labs   Lab 07/10/22  0427 07/10/22  0400 07/10/22  0101 07/09/22  2055 07/09/22  1728 07/09/22  0404 07/09/22  0401 07/08/22  1538 07/08/22  1531     --   --   --  141  --  143  --  142   POTASSIUM 4.2  --   --   --  4.3  --  4.5  --  4.2   CHLORIDE 91*  --   --   --  94*  --  96*  --  94*   CO2 39*  --   --   --  37*  --  39*  --  42*   .0*  --   --   --  110.0*  --  104.0*  --  105.0*   CR 2.39*  --   --   --  2.18*  --  1.96*  --  1.92*   * 172* 207* 233* 183*   < > 125*   < > 132*    < > = values in this interval not displayed.     Liver Function Tests  Recent Labs   Lab 07/07/22 0323 07/06/22 0337 07/04/22 0323   AST 23 24 20   ALT 24 22 24   ALKPHOS 63 59 60   BILITOTAL <0.2 <0.2 <0.2   ALBUMIN 2.8*  --  2.6*     Pancreatic Enzymes  Recent Labs   Lab 07/08/22  0332   LIPASE 17     Coagulation Profile  No lab results found in last 7 days.      5. RADIOLOGY:   Recent Results (from the past 24 hour(s))   XR Chest Port 1 View    Narrative    EXAM: XR CHEST PORT 1 VIEW  7/9/2022 6:39 AM     HISTORY:  chest tubes and R hydropneumothorax s/p bilateral lung  transplant       COMPARISON:  7/8/2022    FINDINGS  Technique: Semiupright AP view of the chest.     Devices: Right internal jugular approach central venous catheter  terminates over the central right innominate vein. Bilateral chest  tubes unchanged. Clamshell sternotomy wires.    Stable perihilar and bibasilar pulmonary opacities. Cardiac silhouette  is stable in size.  Aortic arch calcifications. Remaining small right  apical hydropneumothorax.      Impression    IMPRESSION:     1. Stable small right apical pneumothorax.  2. Stable perihilar/bibasilar pulmonary opacities, atelectasis versus  edema.  3. Stable support devices.    I have personally reviewed the examination and initial interpretation  and I agree with the findings.    JOHANN MORAES MD         SYSTEM ID:  [id number]       =========================================

## 2022-07-10 NOTE — PROGRESS NOTES
Pulmonary Medicine  Cystic Fibrosis - Lung Transplant Team  Progress Note  07/10/2022     Patient: Sofie Rodriguez  MRN: [id number]  : 1960 (age 62 year old)  Transplant: 2022 (Lung), POD #12  Admission date: 2022    Assessment & Plan:   Sofie Rodriguez is a 62 year old female with a PMH significant for end stage COPD, HTN, Hep C, and osteopenia as well as former methamphetamine use.  Pt. is now s/p BSLT on 22.  Surgery on CPB, uncomplicated, lungs slightly undersized for chest. Did require some moderate volume resuscitation with low dose pressors post transplant. Extubated . Persistent CO2 retention, with limited improvement with BiPAP. Etiology is unclear but appears to be a resp drive problem. Persistent low dose pressor requirement of unclear etiology.  Left radial artery thrombus (presumed secondary to arterial line) with thrombectomy under local anesthesia and MAC on .    Recommendations today:   - Continue Bipap for CO2 retention/acidosis at night and as needed during the day depending on VBG results.  - Monitor VBGs.  - Diuresis per renal recommendations.  - Sputum airway cultures with Stenotrophomonas.   - Continue ceftaz for a total fo 14 days  - Continue coverage for strep pneumoniae, stop IV Vanc and switch to Levaquin.  - Tacro steady state trough/peak levels tomorrow (ordered), will need to call ICU RN at 10 AM to confirm peak levels are drawn.  - Prednisolone held while on stress dose hydrocortisone.  Reinitiated prednisolone on 2022 due to being off pressors.  - Hold on bactrim prophylaxis  given elevated creatinine,   - Unable to complete metabolic cart because of hypoxia   - study does not suggest over feeding  - CXR with left base atelectasis (new), bronch next Tuesday (needs to be scheduled)     S/p bilateral sequential lung transplant (BSLT) for end stage COPD:  Persistent hypercapneic respiratory failure: No evidence of PGD post operatively, Extubated to 2L  NC on 6/30.  Ongoing low dose pressor requirement.  Persistent CO2 retention but the patient is not very tachypneic/dyspneic so it appears to be a drive issue.   - Explant pathology with end stage emphysema as expected and all lymph nodes benign.  - 7/10 chest x-ray reviewed by me, shows left base atelectasis (new), NJ/CVC and chest tubes in place.  - RQ study performed, it was done for only 6mins but data was not supportive of Overfeeding.  - Diaphragm assessment by US does not show dysfunction per CVICU  - Pulmonary toilet with chest physiotherapy QID (addition of Aerobika and incentive spirometry once extubated)  - DSA on 7/4 Neg) then one month post-transplant, repeat in one month.  - Ammonia monitoring every twice weekly  x 3 weeks (screening for hyperammonemia post-lung transplant). Ammonia 31 (7/7)  - Bronch 6/29 with patent airways, no significant ischemic reperfusion injury, no significant edema, occasional mucous plugs in lower lobes bilaterally but removed upon therapeutic suctioning.   - PVTS catheters placed 6/29 but removed on 7/2 due to the requirement for heparinization for radial artery thrombectomy.   - CO2 retention but oxygenating well and no evidence of resp distress. Likely reflects preop/chronic CO2 retention. May take some time to re-equilibrate. BiPAP at night and through the day as needed depending on VBG.  Minimize sedation/narcotics. BiPAP for hypercapnea. Stopped using it since 7/8/22. CO2 remains stable, will follow ABG daily.   - Nebs: levalbuterol and Mucomyst QID   - Chest tubes managed by surgical team  - Daily CXR  - Would repeat Bronchoscopy this Tuesday (needs to be scheduled) due to CXR changes and weak cough.        Immunosuppression:  Induction therapy with basiliximab (and high dose IV steroid) given intraoperatively, repeating basiliximab dose on POD#4.  - Tacrolimus goal tacrolimus level 8-12.   Date Tacro Level Intervention   6/29 <1.0 Continue current dose, 1 mg bid    6/30 3.0 Increase to 2 mg bid   7/1 6.9 No adjustment    7/2 9.1  continue current dose    7/3  8.6  switch to tacrolimus by feeding tube 4 mg twice daily starting tomorrow (7/4) morning   7/4 10.2 Switched to enteral today. Not steady state. Continue current dose.   7/5  7.7 Not steady state, continue current dose.   7/6 4.8   increased to 6 mg twice daily.    7/8  5.1 Increased to 9mg BID.   7/11   Peak/Trough levels ordered                   - MMF 1500 mg BID  - Methylprednisolone 125 mg x 3 doses completed. Prednisone was held while on stress dose hydrocortisone. She was off pressors yesterday and was switched from stress dose steroids to prednisolone. Overnight was back on pressors, weaned off in AM.  Date AM dose (mg) PM dose (mg)   7/7 15 15   7/14 15 12.5   7/21 12.5 12.5   8/4 12.5 10   8/18 10 10   9/15 10 7.5   10/13 7.5 7.5   11/10 7.5 5   12/8 5 5   1/5/23 5 2.5      Prophylaxis:   - Bactrim for PJP ppx, begin 7/7, every other day for elevated creatinine  - VGCV for CMV ppx (as below to start on 7/7)  - Nystatin for oral candidiasis ppx, 6 month course  - See below for serologies and viral ppx:    Donor Recipient Intervention   CMV status + + Valganciclovir POD #8-90   EBV status + + EBV check monthly   HSV status N/A + No Acyclovir prophylaxis      Hemodynamics: Persistent hypotension/pressor requirements.  Etiology unclear. - Echocardiogram (7/7/22) with normal ejection fraction. No valvular disease  Started fludrocortisone for hypotension and hyperkalemia 7/6  Trial of stress dose steroids starting 7/6, stopped on 7/9/22.  CT head 7/7 without intracranial pathology to explain hypotension.  7/10/2022: Overnight needed pressors again, weaned off. We will keep prednisone as is and change goal MAP to >55 (Instead of 65). Wonder if she would benefit from IVF, will await nephrology input.    BACILIO: Likely multifactorial including bactrim and hypotension. Unlikely that tacro has been playing a significant  "role since levels have generally been low.  Volume status is unclear.  Hypotension and rising creatinine suggests she is volume depleted but intake has generally been greater than output by greater than a liter each day and the patient does have some dependent edema.  - Diuresis per nephrology recommendations.  - Fludrocortisone for hyperkalemia.  7/9/2022: Cr is worsening along with elevated BUN. Hold diuretics today per nephrology.     Left hand ischemia:  Radial artery thrombosis identified on duplex Doppler.  Thrombectomy under local anesthesia and MAC successful on 7/2. Completed high intensity heparin.  Continue daily aspirin.    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   - AFB to be sent on all future bronchs, last on 6/29  - IgG at one month 7/29 6/30 blood culture negative to date  6/29 respiratory cultures negative to date  6/28 respiratory culture with Stenotrophomonas maltophilia and Streptococcus pneumoniae        Stenotrophomonas Maltophilia: Noted in right explanted lung washings.   -  Sensitivities reviewed. Stopped treatment bactrim. Continue ceftazidime X 14d  --vanco restarted 7/7 for coverage of strep pneumoniae, finish a 10day course.     H/O Hep C: C: Diagnosed in 1980s, 2 mos of treatment, quant negative since 10/2017, last positive 2/20/17 ([phone]).Glenis positive on 6/2021 with negative HCV PCR. Hx of remote ETOH abuse. MR Elastography 4/27/21 with hepatology review and consult without any concerns post transplant.   6/29 HIV RNA negative  Hepatitis B DNA negative  Hepatitis C RNA negative  Hepatitis C antibody positive (old)    History of steroid induced hyperglycemia: Well controlled in outpatient. Management by primary team currently on insulin gtt, may need endo consult prior to discharge.     Abd pain/Diarrhoea: She has had abd pain (\"Crampy\") atleast since Friday if not longer. Diarrhoea ongoing.   - " "KUB xray two days ago with moderate stool burden.  - Decrease MMF to 1g BID.  - Stop Senna-S and Miralax.     We appreciate the excellent care provided by the CVTS and CVICU teams.  Recommendations communicated via in person rounding and this note.  Will continue to follow along closely, please do not hesitate to call with any questions or concerns.     Subjective & Interval History:     Overnight slept fair. She has had abd pains yesterday and it was on and off. It is diffuse and \"Crampy\". Still has diarrhoea. Today in AM the pain is worse and is nauseas too. Patient is communicative cooperative with interview or exam. Followed some simple commands worked with PT/OT. Not on BiPAP since 7/8/22. She has a weak cough. Up in chair daily.    Review of Systems:       Leg swelling is stable.      Physical Exam:     All notes, images, and labs from past 24 hours (at minimum) were reviewed.    Vital signs:  Temp: 98  F (36.7  C) Temp src: Oral   Pulse: 81   Resp: 11 SpO2: (!) 85 % O2 Device: Nasal cannula Oxygen Delivery: 2 LPM Height: 157.5 cm (5' 2\") Weight: 76.7 kg (169 lb 1.5 oz)  I/O:     Intake/Output Summary (Last 24 hours) at 7/10/2022 1348  Last data filed at 7/10/2022 1300  Gross per 24 hour   Intake 2272 ml   Output 660 ml   Net 1612 ml     Constitutional: Lying in bed, in no apparent distress.   HEENT: Eyes with pink conjunctivae, anicteric.  Oral mucosa moist without lesions.  Neck supple without lymphadenopathy.   PULM: Fair air flow bilaterally.  No crackles, no rhonchi, no wheezes.    CV: Normal S1 and S2.  Tachycardic RR.  ++ holosystolic murmur, + gallop, or rub.  + peripheral edema.   ABD: NABS, soft, mild diffusely tender, + distended.    MSK: Moves all extremities.  No apparent muscle wasting.   NEURO: somnolent, minimally conversant.   SKIN: Warm, dry.  No rash on limited exam.   PSYCH: calm.     Lines, Drains, and Devices:  Arterial Line 07/05/22 Brachial (Active)   Site Assessment WDL 07/07/22 1200 "   Line Status Pulsatile blood flow 07/07/22 1200   Arterine Line Cap Change Due 07/08/22 07/07/22 1200   Art Line Waveform Appropriate 07/07/22 1200   Art Line Interventions Leveled;Flushed per protocol;Connections checked and tightened 07/07/22 1200   Color/Movement/Sensation Capillary refill less than 3 sec 07/07/22 1200   Line Necessity Yes, meets criteria 07/07/22 1200   Dressing Type Transparent 07/07/22 1200   Dressing Status Clean, dry, intact 07/07/22 1200   Dressing Intervention Dressing changed/new dressing 07/05/22 1200   Dressing Change Due 07/10/22 07/07/22 1200   Number of days: 2       CVC Double Lumen Right Internal jugular (Active)   Site Assessment WDL 07/07/22 1200   Dressing Type Chlorhexidine disk;Transparent 07/07/22 1200   Dressing Status clean;dry;intact 07/07/22 1200   Dressing Intervention dressing reinforced 07/04/22 0000   Dressing Change Due 07/10/22 07/07/22 1200   Tubing Change secondary tubing;primary tubing 06/29/22 0400   Line Necessity yes, meets criteria 07/07/22 1200   Brown - Status transduced;infusing 07/07/22 1200   Brown - Cap Change Due 07/08/22 07/07/22 1200   White - Status infusing 07/07/22 1200   White - Cap Change Due 07/08/22 07/07/22 1200   Phlebitis Scale 0-->no symptoms 07/07/22 1200   Infiltration? no 07/07/22 1200   Infiltration Scale 0 07/07/22 1200   Infiltration Site Treatment Method  None 07/07/22 0400   Was a vesicant infusing? no 07/07/22 0400   CVC Comment MAC capped 07/07/22 1200   Number of days: 9       Left Radial Interventional Procedure Access (Active)   Site Assessment Ecchymotic 07/07/22 1200   Hemostasis management Unchanged 07/07/22 1200   CMS Left Arm WDL 07/07/22 1200   Radial Pulse - Left Arm Present with doppler 07/07/22 1200   Number of days: 5     Data:     LABS    CMP:   Recent Labs   Lab 07/10/22  1127 07/10/22  0750 07/10/22  0427 07/10/22  0400 07/09/22  2055 07/09/22  1728 07/09/22  0404 07/09/22  0401 07/08/22  1538 07/08/22  1531  07/08/22 0336 07/08/22 0332 07/07/22 0406 07/07/22 0323 07/06/22 0405 07/06/22 0337 07/04/22 0327 07/04/22 0323   NA  --   --  138  --   --  141  --  143  --  142  --  140   < > 136   < > 132*   < >  --    POTASSIUM  --   --  4.2  --   --  4.3  --  4.5  --  4.2  --  4.0   < > 4.5   < > 5.2  5.5*   < >  --    CHLORIDE  --   --  91*  --   --  94*  --  96*  --  94*  --  94*   < > 90*   < > 98   < >  --    CO2  --   --  39*  --   --  37*  --  39*  --  42*  --  39*   < > 32*   < > 28   < >  --    ANIONGAP  --   --  8  --   --  10  --  8  --  6*  --  7   < > 14   < > 6*   < >  --    * 178* 198* 172*   < > 183*   < > 125*   < > 132*   < > 168*   < > 183*   < > 244*   < >  --    BUN  --   --  114.0*  --   --  110.0*  --  104.0*  --  105.0*  --  97.0*   < > 94.8*   < > 82.7*   < >  --    CR  --   --  2.39*  --   --  2.18*  --  1.96*  --  1.92*  --  1.77*   < > 1.99*   < > 1.98*   < >  --    GFRESTIMATED  --   --  23*  --   --  25*  --  29*  --  29*  --  32*   < > 28*   < > 28*   < >  --    ESTUARDO  --   --  8.5*  --   --  8.7*  --  8.4*  --  8.6*  --  8.4*   < > 8.1*   < > 8.0*   < >  --    MAG  --   --  2.9*  --   --   --   --  2.5*  --   --   --  2.5*  --  2.6*  --  2.9*   < >  --    PHOS  --   --  5.4*  --   --   --   --  6.2*  --   --   --  4.6*  --  5.6*  --  3.5   < >  --    PROTTOTAL  --   --   --   --   --   --   --   --   --   --   --   --   --  4.6*  --  4.4*  --  4.4*   ALBUMIN  --   --   --   --   --   --   --   --   --   --   --   --   --  2.8*  --   --   --  2.6*   BILITOTAL  --   --   --   --   --   --   --   --   --   --   --   --   --  <0.2  --  <0.2  --  <0.2   ALKPHOS  --   --   --   --   --   --   --   --   --   --   --   --   --  63  --  59  --  60   AST  --   --   --   --   --   --   --   --   --   --   --   --   --  23  --  24  --  20   ALT  --   --   --   --   --   --   --   --   --   --   --   --   --  24  --  22  --  24    < > = values in this interval not displayed.     CBC:   Recent  Labs   Lab 07/10/22  0427 07/09/22  1728 07/09/22  0401 07/08/22  1531   WBC 17.9* 19.1* 16.5*  16.5* 16.4*   RBC 2.56* 2.68* 2.60*  2.60* 2.70*   HGB 7.9* 8.4* 7.9*  7.9* 8.3*   HCT 26.0* 27.2* 26.5*  26.5* 27.2*   * 102* 102*  102* 101*   MCH 30.9 31.3 30.4  30.4 30.7   MCHC 30.4* 30.9* 29.8*  29.8* 30.5*   RDW 16.1* 16.3* 16.3*  16.3* 16.6*    339 302  302 305       INR: No lab results found in last 7 days.    Glucose:   Recent Labs   Lab 07/10/22  1127 07/10/22  0750 07/10/22  0427 07/10/22  0400 07/10/22  0101 07/09/22  2055   * 178* 198* 172* 207* 233*       Blood Gas:   Recent Labs   Lab 07/10/22  0640 07/09/22  0401 07/08/22 2027 07/05/22  1435 07/05/22  1207 07/05/22  0345 07/04/22  1944   PHV  --   --   --   --  7.20* 7.27* 7.24*   PCO2V  --   --   --   --  81* 74* 77*   PO2V  --   --   --   --  47 41 41   HCO3V  --   --   --   --  32* 34* 33*   LINDY  --   --   --   --  3.1* 6.2* 4.5*   O2PER 24 21 21   < > 4 25 25    < > = values in this interval not displayed.       Culture Data No results for input(s): CULT in the last 168 hours.    Virology Data:   Lab Results   Component Value Date    FLUAH1 Not Detected 06/29/2022    FLUAH3 Not Detected 06/29/2022    UL3093 Not Detected 06/29/2022    IFLUB Not Detected 06/29/2022    RSVA Not Detected 06/29/2022    RSVB Not Detected 06/29/2022    PIV1 Not Detected 06/29/2022    PIV2 Not Detected 06/29/2022    PIV3 Not Detected 06/29/2022    HMPV Not Detected 06/29/2022       Historical CMV results (last 3 of prior testing):  No results found for: CMVQNT  No results found for: CMVLOG    Urine Studies    Recent Labs   Lab Test 07/08/22  0831 07/05/22  1004   URINEPH 5.5 5.5   NITRITE Negative Negative   LEUKEST Negative Negative   WBCU 1 5       Most Recent [facility] Pulmonary Function Testing (FVC/FEV1 only)  FVC-Pre   Date Value Ref Range Status   04/29/2022 1.82 L    11/11/2021 2.17 L    06/14/2021 2.00 L      FVC-%Pred-Pre   Date Value Ref  Range Status   04/29/2022 58 %    11/11/2021 70 %    06/14/2021 64 %      FEV1-Pre   Date Value Ref Range Status   04/29/2022 0.51 L    11/11/2021 0.53 L    06/14/2021 0.54 L      FEV1-%Pred-Pre   Date Value Ref Range Status   04/29/2022 20 %    11/11/2021 21 %    06/14/2021 21 %        IMAGING    Recent Results (from the past 48 hour(s))   XR Chest Port 1 View    Narrative    EXAM: XR CHEST PORT 1 VIEW  7/6/2022 12:44 AM     HISTORY:  Post-Op Lung       COMPARISON:  7/5/2022    FINDINGS  Technique: Semiupright AP view of the chest.     Devices: Right internal jugular approach central venous catheter  terminates over the mid SVC. Bilateral apical and basal chest tubes  are not appreciably changed. Clamshell sternotomy wires.    Unchanged streaky perihilar and bibasilar pulmonary opacities. Cardiac  silhouette is stable in size. Aortic arch calcifications. Remaining  trace right apical pneumothorax. No pleural effusion.       Impression    IMPRESSION:     1. Trace remaining right apical pneumothorax with bilateral chest  tubes in place.  2. Support devices stable.  3. Unchanged bilateral perihilar/basilar atelectasis/edema.    I have personally reviewed the examination and initial interpretation  and I agree with the findings.    GABRIELLA SNELL MD         SYSTEM ID:  [id number]   XR Chest Port 1 View    Narrative    Portable chest 7/6/2022 at 1306 hours    INDICATION: Post chest tube removal    COMPARISON: 0040 hours earlier today    FINDINGS: Interim bilateral thoracostomy tube removal. Clamshell  sternotomy from prior bilateral lung transplantation again present. No  conspicuous pneumothorax upon chest tube removal on either side. Right  basilar thoracostomy tube remains. Feeding tube beyond the inferior  margin of the image.      Impression    IMPRESSION: No conspicuous pneumothorax. Prior bilateral lung  transplant.    ALVINA HARRY MD         SYSTEM ID:  [id number]   XR Chest Port 1 View    Narrative    Exam:  XR CHEST PORT 1 VIEW, 2022 5:47 AM    Indication: Post-Op Lung    Comparison: 2022    Findings:   Right IJ sheath over the SVC/innominate confluence. Feeding tube  courses below diaphragm with tip excluded from field-of-view.  Unchanged chest tubes and mediastinal drain.    Unchanged cardiac silhouette. Unchanged blunting of the left  hemidiaphragm. Unchanged small right hydropneumothorax. No new focal  pulmonary opacities.      Impression    Impression: Bilateral lung transplants with unchanged small right  hydropneumothorax.    I have personally reviewed the examination and initial interpretation  and I agree with the findings.    JOHANN MORAES MD         SYSTEM ID:  [id number]   Echo Limited   Result Value    LVEF  55-60%    Narrative    [id number]  [id number]  [id number]  [id number]^MARIO ALBERTO^HERNAN^RAJ     [facility],[place]  Echocardiography Laboratory  [address] [place] [id number]     Name: ALMAS CASIANO  MRN: [id number]  : 1960  Study Date: 2022 12:28 PM  Age: 62 yrs  Gender: Female  Patient Location: [facility] [place]  Reason For Study: Murmur  Ordering Physician: HERNAN LLANES  Performed By: Bonifacio Lin RDCS     BSA: 1.8 m2  Height: 62 in  Weight: 164 lb  HR: 86  BP: 115/54 mmHg  ______________________________________________________________________________  Procedure  Limited Portable Echo Adult.  ______________________________________________________________________________  Interpretation Summary     Global and regional left ventricular function is normal with an EF of 55-60%.  Global right ventricular function is normal.The right ventricle is normal  size.  No significant valvular abnormalities.  IVC diameter and respiratory changes fall into an intermediate range  suggesting an RA pressure of 8 mmHg.  No pericardial effusion is present.  This study was compared with the study from 7/3/22 there has been no change  .  ______________________________________________________________________________  Left Ventricle  Left ventricular size is normal. Left ventricular wall thickness is normal.  Global and regional left ventricular function is normal with an EF of 55-60%.  No regional wall motion abnormalities are seen.     Right Ventricle  The right ventricle is normal size. Global right ventricular function is  normal.     Atria  Both atria appear normal.     Mitral Valve  The mitral valve is normal. Trace mitral insufficiency is present.     Aortic Valve  The aortic valve is tricuspid.     Tricuspid Valve  The peak velocity of the tricuspid regurgitant jet is not obtainable.  Pulmonary artery systolic pressure cannot be assessed.     Pulmonic Valve  The valve leaflets are not well visualized. On Doppler interrogation, there is  no significant stenosis or regurgitation.     Vessels  IVC diameter and respiratory changes fall into an intermediate range  suggesting an RA pressure of 8 mmHg.     Pericardium  No pericardial effusion is present.     Compared to Previous Study  This study was compared with the study from 7/3/22 there has been no change .  ______________________________________________________________________________  MMode/2D Measurements & Calculations     IVSd: 1.2 cm  LVIDd: 4.4 cm  LVIDs: 2.3 cm  LVPWd: 1.2 cm  FS: 49.2 %  LV mass(C)d: 194.5 grams  LV mass(C)dI: 110.7 grams/m2  LA dimension: 4.0 cm  RWT: 0.55     ______________________________________________________________________________  Report approved by: Martínez Tanner 07/07/2022 01:33 PM         CT Chest w/o Contrast    Narrative    CT chest without contrast    Indication evaluate fluid collection. Recent one transplant 6/28    COMPARISON: Most recent available chest CT 11/11/2021    FINDINGS: Catheter from right side approach at the junction of the  right and left innominate veins at the upper margin of the SVC. Tubing  in the right axilla.  Tube into the stomach progressing beyond the  inferior margin of the image. Trace right pleural effusion. Small left  pleural effusion. Cardiomegaly. No pericardial effusion. Small  mediastinal lymph nodes which may be reactive. Pericardial drain. Main  pulmonary artery mildly enlarged at 3.8 cm. Other tubing in the  abdomen which may be in a loop of bowel. Right thoracostomy tube.  Small amount of perihepatic ascites.  Bones show clamshell sternotomy from prior bilateral lung transplant.  Mild degenerative changes in the thoracic spine. There is contrast in  the stomach.  Right hydropneumothorax associated with the effusion. Chest wall  subcutaneous emphysema. Retrosternal air. There is some narrowing of  the left sided anastomosis. Multiple anterior chest wall subcutaneous  emphysema. Air collection right chest wall anteriorly associated with  the pleura on the right. Please correlate for any sign of  bronchopleural fistula formation. Loculated fluid and air in the left  major fissure. No dominant pulmonary nodule. Other small  hydropneumothorax on the left.      Impression    IMPRESSION: Bilateral small hydropneumothoraces. Prior bilateral lung  transplant. Right chest wall abutting the right pleura, please  correlate for any evidence of bronchopleural fistula formation versus  recent postsurgical air. Enlarged pulmonary artery. Multiple support  devices. Cardiomegaly.    ALVINA HARRY MD         SYSTEM ID:  [id number]

## 2022-07-10 NOTE — PROGRESS NOTES
"    Interval History :   Patient has worsening creatinine and abdominal distension.Urine output is decreasing. She got off pressor and blood pressure has improve. She does have tremor and since yesterday had 14 bowel movements.      Key management decisions:   #1 acute kidney injury likely secondary to ischemic ATN (worsening) Her renal function is worsening and it could be that she is volume down from diarrhea as since yesterday she had 14 episodes.Other possibility is her belly distended so this could be compartment syndrome and she does have tremor and diarrhe a which can cause tacrolimus level to go up.  ; It seems she got another hit on 7/8 with a slight elevation in creatinine from 1.77-->1.98-->2.53mg/dl.  There has been some increase in her WBCS since 7/8 ? New infection? C diff?   She has been urinating well initially helped by diuretics but now urine output decrease significanlty  -would hold on lasix/bumex today   -Give IV NS for 24 hour  -Please place a montgomery  -Measure bladder pressure  -Check tacrolimus level as with diarrhea level can go up  -Consider imaging the belly       #2 acid-base status: Initially  acidemia with a pH of 7.29 and now improve to 7.39 which is mainly driven by respiratory acidosis post lung transplant/centrally mediated.  This is managed by the ICU team through the BiPAP.  I would avoid sodium bicarb at this stage with her new lung transplant; leading to more CO2 generation and poor clearance by the lungs .      Plan of care conveyed to  primary team verbally    Plan of care discuss and seen with Dr. Arboleda      Lorenzana exam findings:   /74 (BP Location: Right arm)   Pulse 81   Temp 97.7  F (36.5  C) (Oral)   Resp (!) 33   Ht 1.575 m (5' 2\")   Wt 76.7 kg (169 lb 1.5 oz)   SpO2 95%   BMI 30.93 kg/m    Gen: awake; lying flat  Lungs: symmetrical bilateral air entry anteriorly; mechanical breath sounds  Heart: RRR   Abdomen: positive bowel sounds, distended  Lower " extremities: +2 edema           Intake/Output Summary (Last 24 hours) at 7/10/2022 1728  Last data filed at 7/10/2022 1600  Gross per 24 hour   Intake 2147.97 ml   Output 720 ml   Net 1427.97 ml           Electrolytes/Renal -   Recent Labs   Lab Test 07/10/22  1608 07/10/22  0427 07/09/22  1728 07/09/22  0401 07/08/22  1531 07/08/22  0332    138 141 143   < > 140   POTASSIUM 4.1 4.2 4.3 4.5   < > 4.0   CO2 40* 39* 37* 39*   < > 39*   CR 2.53* 2.39* 2.18* 1.96*   < > 1.77*   ESTUARDO 8.4* 8.5* 8.7* 8.4*   < > 8.4*   PHOS  --  5.4*  --  6.2*  --  4.6*    < > = values in this interval not displayed.       CBC -   Recent Labs   Lab Test 07/10/22  0427 07/09/22  1728 07/09/22  0401   WBC 17.9* 19.1* 16.5*  16.5*   HGB 7.9* 8.4* 7.9*  7.9*    339 302  302     Current Facility-Administered Medications   Medication     acetaminophen (TYLENOL) tablet 650 mg     acetaminophen (TYLENOL) tablet 975 mg     acetylcysteine (MUCOMYST) 20 % nebulizer solution 2 mL     aspirin (ASA) chewable tablet 81 mg     bisacodyl (DULCOLAX) suppository 10 mg     calcium carbonate (TUMS) chewable tablet 500-1,000 mg     calcium carbonate 600 mg-vitamin D 400 units (CALTRATE) per tablet 1 tablet     dextrose 10% infusion     glucose gel 15-30 g    Or     dextrose 50 % injection 25-50 mL    Or     glucagon injection 1 mg     fludrocortisone (FLORINEF) tablet 0.1 mg     heparin (porcine) Infusion 1000 units/1000 mL for A-line     heparin ANTICOAGULANT injection 5,000 Units     hydrOXYzine (ATARAX) tablet 25 mg    Or     hydrOXYzine (ATARAX) tablet 50 mg     insulin aspart (NovoLOG) injection (RAPID ACTING)     [Held by provider] insulin glargine (LANTUS PEN) injection 35 Units     lactated ringers BOLUS 500 mL     lactobacillus rhamnosus (GG) (CULTURELL) capsule 1 capsule     levalbuterol (XOPENEX) neb solution 1.25 mg     levofloxacin (LEVAQUIN) infusion 500 mg     Lidocaine (LIDOCARE) 4 % Patch 2 patch     lidocaine (LMX4) cream      lidocaine 1 % 0.1-1 mL     lidocaine patch in PLACE     magnesium hydroxide (MILK OF MAGNESIA) suspension 30 mL     methocarbamol (ROBAXIN) tablet 500 mg     metoprolol (LOPRESSOR) injection 2.5 mg     midodrine (PROAMATINE) tablet 30 mg     multivitamins w/minerals liquid 15 mL     mycophenolate (GENERIC EQUIVALENT) capsule 1,000 mg    Or     mycophenolate (CELLCEPT BRAND) suspension 1,000 mg     naloxone (NARCAN) injection 0.2 mg    Or     naloxone (NARCAN) injection 0.4 mg    Or     naloxone (NARCAN) injection 0.2 mg    Or     naloxone (NARCAN) injection 0.4 mg     norepinephrine (LEVOPHED) 16 mg in  mL infusion MAX CONC CENTRAL LINE     nystatin (MYCOSTATIN) cream     nystatin (MYCOSTATIN) suspension 1,000,000 Units     ondansetron (ZOFRAN ODT) ODT tab 4 mg    Or     ondansetron (ZOFRAN) injection 4 mg     oxyCODONE (ROXICODONE) tablet 5 mg     pantoprazole (PROTONIX) 2 mg/mL suspension 40 mg     [Held by provider] polyethylene glycol (MIRALAX) Packet 17 g     predniSONE (DELTASONE) tablet 17.5 mg     prochlorperazine (COMPAZINE) injection 10 mg    Or     prochlorperazine (COMPAZINE) tablet 10 mg     protein modular (PROSOURCE TF) 1 packet     [Held by provider] senna-docusate (SENOKOT-S/PERICOLACE) 8.6-50 MG per tablet 2 tablet     simethicone (MYLICON) chewable tablet 80 mg     sodium chloride (PF) 0.9% PF flush 3 mL     sodium chloride (PF) 0.9% PF flush 3 mL     sodium chloride 0.9% infusion     [Held by provider] sulfamethoxazole-trimethoprim (BACTRIM) 400-80 MG per tablet 1 tablet     tacrolimus (GENERIC EQUIVALENT) suspension 9 mg     thiamine (B-1) injection 100 mg     valGANciclovir (VALCYTE) solution 450 mg      Please avoid placing a PICC line in any patient with CKD III or above, BACILIO, or ESKD, as this will impact negatively the ability of the patient to have an AV fistula or AV Graft should they need it in the future.  A medline is the preferred type of access in patients with kidney disease.  Thank you.    Glenn Alanis MD  Pager [phone]

## 2022-07-11 ENCOUNTER — APPOINTMENT (OUTPATIENT)
Dept: SPEECH THERAPY | Facility: CLINIC | Age: 62
DRG: 007 | End: 2022-07-11
Attending: THORACIC SURGERY (CARDIOTHORACIC VASCULAR SURGERY)
Payer: MEDICARE

## 2022-07-11 ENCOUNTER — APPOINTMENT (OUTPATIENT)
Dept: OCCUPATIONAL THERAPY | Facility: CLINIC | Age: 62
DRG: 007 | End: 2022-07-11
Attending: THORACIC SURGERY (CARDIOTHORACIC VASCULAR SURGERY)
Payer: MEDICARE

## 2022-07-11 ENCOUNTER — APPOINTMENT (OUTPATIENT)
Dept: GENERAL RADIOLOGY | Facility: CLINIC | Age: 62
DRG: 007 | End: 2022-07-11
Attending: THORACIC SURGERY (CARDIOTHORACIC VASCULAR SURGERY)
Payer: MEDICARE

## 2022-07-11 LAB
ALBUMIN SERPL BCG-MCNC: 2.7 G/DL (ref 3.5–5.2)
ALP SERPL-CCNC: 78 U/L (ref 35–104)
ALT SERPL W P-5'-P-CCNC: 22 U/L (ref 10–35)
AMMONIA PLAS-SCNC: 25 UMOL/L (ref 11–51)
ANION GAP SERPL CALCULATED.3IONS-SCNC: 10 MMOL/L (ref 7–15)
ANION GAP SERPL CALCULATED.3IONS-SCNC: 8 MMOL/L (ref 7–15)
AST SERPL W P-5'-P-CCNC: 16 U/L (ref 10–35)
BASE EXCESS BLDA CALC-SCNC: 12.9 MMOL/L (ref -9–1.8)
BASOPHILS # BLD AUTO: 0 10E3/UL (ref 0–0.2)
BASOPHILS NFR BLD AUTO: 0 %
BILIRUB DIRECT SERPL-MCNC: <0.2 MG/DL (ref 0–0.3)
BILIRUB SERPL-MCNC: <0.2 MG/DL
BUN SERPL-MCNC: 126 MG/DL (ref 8–23)
BUN SERPL-MCNC: 127 MG/DL (ref 8–23)
C DIFF TOX B STL QL: POSITIVE
CALCIUM SERPL-MCNC: 8.4 MG/DL (ref 8.8–10.2)
CALCIUM SERPL-MCNC: 8.4 MG/DL (ref 8.8–10.2)
CHLORIDE SERPL-SCNC: 93 MMOL/L (ref 98–107)
CHLORIDE SERPL-SCNC: 94 MMOL/L (ref 98–107)
CREAT SERPL-MCNC: 2.78 MG/DL (ref 0.51–0.95)
CREAT SERPL-MCNC: 2.89 MG/DL (ref 0.51–0.95)
DEPRECATED HCO3 PLAS-SCNC: 34 MMOL/L (ref 22–29)
DEPRECATED HCO3 PLAS-SCNC: 38 MMOL/L (ref 22–29)
EOSINOPHIL # BLD AUTO: 0 10E3/UL (ref 0–0.7)
EOSINOPHIL NFR BLD AUTO: 0 %
ERYTHROCYTE [DISTWIDTH] IN BLOOD BY AUTOMATED COUNT: 16.5 % (ref 10–15)
GFR SERPL CREATININE-BSD FRML MDRD: 18 ML/MIN/1.73M2
GFR SERPL CREATININE-BSD FRML MDRD: 19 ML/MIN/1.73M2
GLUCOSE BLDC GLUCOMTR-MCNC: 218 MG/DL (ref 70–99)
GLUCOSE BLDC GLUCOMTR-MCNC: 221 MG/DL (ref 70–99)
GLUCOSE BLDC GLUCOMTR-MCNC: 231 MG/DL (ref 70–99)
GLUCOSE BLDC GLUCOMTR-MCNC: 234 MG/DL (ref 70–99)
GLUCOSE BLDC GLUCOMTR-MCNC: 253 MG/DL (ref 70–99)
GLUCOSE BLDC GLUCOMTR-MCNC: 272 MG/DL (ref 70–99)
GLUCOSE SERPL-MCNC: 233 MG/DL (ref 70–99)
GLUCOSE SERPL-MCNC: 257 MG/DL (ref 70–99)
HCO3 BLD-SCNC: 41 MMOL/L (ref 21–28)
HCT VFR BLD AUTO: 25.5 % (ref 35–47)
HGB BLD-MCNC: 7.7 G/DL (ref 11.7–15.7)
IMM GRANULOCYTES # BLD: 0.1 10E3/UL
IMM GRANULOCYTES NFR BLD: 1 %
LACTATE SERPL-SCNC: 0.6 MMOL/L (ref 0.7–2)
LYMPHOCYTES # BLD AUTO: 0.3 10E3/UL (ref 0.8–5.3)
LYMPHOCYTES NFR BLD AUTO: 1 %
MAGNESIUM SERPL-MCNC: 2.7 MG/DL (ref 1.7–2.3)
MCH RBC QN AUTO: 31.2 PG (ref 26.5–33)
MCHC RBC AUTO-ENTMCNC: 30.2 G/DL (ref 31.5–36.5)
MCV RBC AUTO: 103 FL (ref 78–100)
MONOCYTES # BLD AUTO: 0.8 10E3/UL (ref 0–1.3)
MONOCYTES NFR BLD AUTO: 4 %
NEUTROPHILS # BLD AUTO: 17.2 10E3/UL (ref 1.6–8.3)
NEUTROPHILS NFR BLD AUTO: 94 %
NRBC # BLD AUTO: 0 10E3/UL
NRBC BLD AUTO-RTO: 0 /100
O2/TOTAL GAS SETTING VFR VENT: 21 %
OXYHGB MFR BLD: 99 % (ref 92–100)
PCO2 BLD: 84 MM HG (ref 35–45)
PH BLD: 7.3 [PH] (ref 7.35–7.45)
PHOSPHATE SERPL-MCNC: 5.4 MG/DL (ref 2.5–4.5)
PLATELET # BLD AUTO: 287 10E3/UL (ref 150–450)
PO2 BLD: 173 MM HG (ref 80–105)
POTASSIUM SERPL-SCNC: 4.4 MMOL/L (ref 3.4–5.3)
POTASSIUM SERPL-SCNC: 4.4 MMOL/L (ref 3.4–5.3)
PROT SERPL-MCNC: 4.6 G/DL (ref 6.4–8.3)
RBC # BLD AUTO: 2.47 10E6/UL (ref 3.8–5.2)
SODIUM SERPL-SCNC: 138 MMOL/L (ref 136–145)
SODIUM SERPL-SCNC: 139 MMOL/L (ref 136–145)
SODIUM UR-SCNC: 76 MMOL/L
TACROLIMUS BLD-MCNC: 18.6 UG/L (ref 5–15)
TACROLIMUS BLD-MCNC: 43.2 UG/L (ref 5–15)
TME LAST DOSE: ABNORMAL H
WBC # BLD AUTO: 18.4 10E3/UL (ref 4–11)

## 2022-07-11 PROCEDURE — 94668 MNPJ CHEST WALL SBSQ: CPT

## 2022-07-11 PROCEDURE — 200N000002 HC R&B ICU UMMC

## 2022-07-11 PROCEDURE — 250N000013 HC RX MED GY IP 250 OP 250 PS 637: Performed by: SURGERY

## 2022-07-11 PROCEDURE — 250N000013 HC RX MED GY IP 250 OP 250 PS 637: Performed by: THORACIC SURGERY (CARDIOTHORACIC VASCULAR SURGERY)

## 2022-07-11 PROCEDURE — 92526 ORAL FUNCTION THERAPY: CPT | Mod: GN

## 2022-07-11 PROCEDURE — 71045 X-RAY EXAM CHEST 1 VIEW: CPT

## 2022-07-11 PROCEDURE — 99233 SBSQ HOSP IP/OBS HIGH 50: CPT | Mod: 24 | Performed by: PHYSICIAN ASSISTANT

## 2022-07-11 PROCEDURE — 250N000011 HC RX IP 250 OP 636

## 2022-07-11 PROCEDURE — 258N000003 HC RX IP 258 OP 636: Performed by: STUDENT IN AN ORGANIZED HEALTH CARE EDUCATION/TRAINING PROGRAM

## 2022-07-11 PROCEDURE — 84100 ASSAY OF PHOSPHORUS: CPT | Performed by: SURGERY

## 2022-07-11 PROCEDURE — 250N000012 HC RX MED GY IP 250 OP 636 PS 637: Performed by: INTERNAL MEDICINE

## 2022-07-11 PROCEDURE — 82140 ASSAY OF AMMONIA: CPT | Performed by: SURGERY

## 2022-07-11 PROCEDURE — 85014 HEMATOCRIT: CPT | Performed by: SURGERY

## 2022-07-11 PROCEDURE — 250N000012 HC RX MED GY IP 250 OP 636 PS 637: Performed by: STUDENT IN AN ORGANIZED HEALTH CARE EDUCATION/TRAINING PROGRAM

## 2022-07-11 PROCEDURE — 94640 AIRWAY INHALATION TREATMENT: CPT | Mod: 76

## 2022-07-11 PROCEDURE — 84300 ASSAY OF URINE SODIUM: CPT | Performed by: CLINICAL NURSE SPECIALIST

## 2022-07-11 PROCEDURE — 87493 C DIFF AMPLIFIED PROBE: CPT | Performed by: STUDENT IN AN ORGANIZED HEALTH CARE EDUCATION/TRAINING PROGRAM

## 2022-07-11 PROCEDURE — 83735 ASSAY OF MAGNESIUM: CPT | Performed by: SURGERY

## 2022-07-11 PROCEDURE — 250N000011 HC RX IP 250 OP 636: Performed by: STUDENT IN AN ORGANIZED HEALTH CARE EDUCATION/TRAINING PROGRAM

## 2022-07-11 PROCEDURE — 82310 ASSAY OF CALCIUM: CPT | Performed by: STUDENT IN AN ORGANIZED HEALTH CARE EDUCATION/TRAINING PROGRAM

## 2022-07-11 PROCEDURE — 82805 BLOOD GASES W/O2 SATURATION: CPT | Performed by: STUDENT IN AN ORGANIZED HEALTH CARE EDUCATION/TRAINING PROGRAM

## 2022-07-11 PROCEDURE — 999N000157 HC STATISTIC RCP TIME EA 10 MIN

## 2022-07-11 PROCEDURE — 99291 CRITICAL CARE FIRST HOUR: CPT | Mod: 24 | Performed by: ANESTHESIOLOGY

## 2022-07-11 PROCEDURE — 250N000013 HC RX MED GY IP 250 OP 250 PS 637: Performed by: STUDENT IN AN ORGANIZED HEALTH CARE EDUCATION/TRAINING PROGRAM

## 2022-07-11 PROCEDURE — 82248 BILIRUBIN DIRECT: CPT | Performed by: SURGERY

## 2022-07-11 PROCEDURE — 80197 ASSAY OF TACROLIMUS: CPT | Performed by: INTERNAL MEDICINE

## 2022-07-11 PROCEDURE — 250N000009 HC RX 250: Performed by: SURGERY

## 2022-07-11 PROCEDURE — 99233 SBSQ HOSP IP/OBS HIGH 50: CPT | Mod: 24 | Performed by: CLINICAL NURSE SPECIALIST

## 2022-07-11 PROCEDURE — 99207 PR NO BILLABLE SERVICE THIS VISIT: CPT | Performed by: PHYSICIAN ASSISTANT

## 2022-07-11 PROCEDURE — 94640 AIRWAY INHALATION TREATMENT: CPT

## 2022-07-11 PROCEDURE — 97530 THERAPEUTIC ACTIVITIES: CPT | Mod: GO

## 2022-07-11 PROCEDURE — 83605 ASSAY OF LACTIC ACID: CPT | Performed by: ANESTHESIOLOGY

## 2022-07-11 PROCEDURE — 71045 X-RAY EXAM CHEST 1 VIEW: CPT | Mod: 26 | Performed by: RADIOLOGY

## 2022-07-11 PROCEDURE — 250N000011 HC RX IP 250 OP 636: Performed by: INTERNAL MEDICINE

## 2022-07-11 PROCEDURE — 250N000013 HC RX MED GY IP 250 OP 250 PS 637

## 2022-07-11 RX ORDER — VANCOMYCIN HYDROCHLORIDE 125 MG/1
125 CAPSULE ORAL 4 TIMES DAILY
Status: DISCONTINUED | OUTPATIENT
Start: 2022-07-11 | End: 2022-07-12

## 2022-07-11 RX ORDER — METRONIDAZOLE 500 MG/100ML
500 INJECTION, SOLUTION INTRAVENOUS EVERY 8 HOURS
Status: DISCONTINUED | OUTPATIENT
Start: 2022-07-11 | End: 2022-07-11

## 2022-07-11 RX ADMIN — MIDODRINE HYDROCHLORIDE 30 MG: 5 TABLET ORAL at 08:06

## 2022-07-11 RX ADMIN — MULTIVITAMIN 15 ML: LIQUID ORAL at 08:06

## 2022-07-11 RX ADMIN — Medication 1 PACKET: at 08:09

## 2022-07-11 RX ADMIN — SODIUM CHLORIDE: 9 INJECTION, SOLUTION INTRAVENOUS at 04:44

## 2022-07-11 RX ADMIN — INSULIN ASPART 6 UNITS: 100 INJECTION, SOLUTION INTRAVENOUS; SUBCUTANEOUS at 00:34

## 2022-07-11 RX ADMIN — INSULIN ASPART 4 UNITS: 100 INJECTION, SOLUTION INTRAVENOUS; SUBCUTANEOUS at 20:43

## 2022-07-11 RX ADMIN — MIDODRINE HYDROCHLORIDE 30 MG: 5 TABLET ORAL at 00:34

## 2022-07-11 RX ADMIN — NYSTATIN 1000000 UNITS: 100000 SUSPENSION ORAL at 20:42

## 2022-07-11 RX ADMIN — FLUDROCORTISONE ACETATE 0.1 MG: 0.1 TABLET ORAL at 08:08

## 2022-07-11 RX ADMIN — NYSTATIN 1000000 UNITS: 100000 SUSPENSION ORAL at 11:23

## 2022-07-11 RX ADMIN — HEPARIN SODIUM 5000 UNITS: 5000 INJECTION, SOLUTION INTRAVENOUS; SUBCUTANEOUS at 13:14

## 2022-07-11 RX ADMIN — THIAMINE HYDROCHLORIDE 100 MG: 100 INJECTION, SOLUTION INTRAMUSCULAR; INTRAVENOUS at 08:07

## 2022-07-11 RX ADMIN — PREDNISONE 17.5 MG: 2.5 TABLET ORAL at 08:07

## 2022-07-11 RX ADMIN — ASPIRIN 81 MG CHEWABLE TABLET 81 MG: 81 TABLET CHEWABLE at 08:06

## 2022-07-11 RX ADMIN — MYCOPHENOLATE MOFETIL 1000 MG: 250 CAPSULE ORAL at 08:07

## 2022-07-11 RX ADMIN — INSULIN ASPART 4 UNITS: 100 INJECTION, SOLUTION INTRAVENOUS; SUBCUTANEOUS at 11:23

## 2022-07-11 RX ADMIN — LEVALBUTEROL HYDROCHLORIDE 1.25 MG: 1.25 SOLUTION RESPIRATORY (INHALATION) at 13:24

## 2022-07-11 RX ADMIN — Medication 1 CAPSULE: at 08:08

## 2022-07-11 RX ADMIN — NYSTATIN: 100000 CREAM TOPICAL at 20:00

## 2022-07-11 RX ADMIN — INSULIN ASPART 4 UNITS: 100 INJECTION, SOLUTION INTRAVENOUS; SUBCUTANEOUS at 08:09

## 2022-07-11 RX ADMIN — NYSTATIN: 100000 CREAM TOPICAL at 08:10

## 2022-07-11 RX ADMIN — NYSTATIN 1000000 UNITS: 100000 SUSPENSION ORAL at 15:20

## 2022-07-11 RX ADMIN — ACETYLCYSTEINE 2 ML: 200 SOLUTION ORAL; RESPIRATORY (INHALATION) at 16:01

## 2022-07-11 RX ADMIN — VANCOMYCIN HYDROCHLORIDE 125 MG: 125 CAPSULE ORAL at 20:42

## 2022-07-11 RX ADMIN — Medication 40 MG: at 08:07

## 2022-07-11 RX ADMIN — MYCOPHENOLATE MOFETIL 1000 MG: 250 CAPSULE ORAL at 20:42

## 2022-07-11 RX ADMIN — CALCIUM CARBONATE 600 MG (1,500 MG)-VITAMIN D3 400 UNIT TABLET 1 TABLET: at 17:25

## 2022-07-11 RX ADMIN — OXYCODONE HYDROCHLORIDE 5 MG: 5 TABLET ORAL at 22:35

## 2022-07-11 RX ADMIN — Medication 1 CAPSULE: at 20:41

## 2022-07-11 RX ADMIN — VANCOMYCIN HYDROCHLORIDE 125 MG: 125 CAPSULE ORAL at 15:20

## 2022-07-11 RX ADMIN — HEPARIN SODIUM 5000 UNITS: 5000 INJECTION, SOLUTION INTRAVENOUS; SUBCUTANEOUS at 06:16

## 2022-07-11 RX ADMIN — ACETYLCYSTEINE 2 ML: 200 SOLUTION ORAL; RESPIRATORY (INHALATION) at 08:36

## 2022-07-11 RX ADMIN — ACETYLCYSTEINE 2 ML: 200 SOLUTION ORAL; RESPIRATORY (INHALATION) at 20:56

## 2022-07-11 RX ADMIN — ACETAMINOPHEN 975 MG: 325 TABLET, FILM COATED ORAL at 20:41

## 2022-07-11 RX ADMIN — INSULIN ASPART 4 UNITS: 100 INJECTION, SOLUTION INTRAVENOUS; SUBCUTANEOUS at 15:20

## 2022-07-11 RX ADMIN — OXYCODONE HYDROCHLORIDE 5 MG: 5 TABLET ORAL at 17:25

## 2022-07-11 RX ADMIN — LEVALBUTEROL HYDROCHLORIDE 1.25 MG: 1.25 SOLUTION RESPIRATORY (INHALATION) at 16:01

## 2022-07-11 RX ADMIN — LEVALBUTEROL HYDROCHLORIDE 1.25 MG: 1.25 SOLUTION RESPIRATORY (INHALATION) at 20:56

## 2022-07-11 RX ADMIN — ACETAMINOPHEN 975 MG: 325 TABLET, FILM COATED ORAL at 04:15

## 2022-07-11 RX ADMIN — HEPARIN SODIUM 5000 UNITS: 5000 INJECTION, SOLUTION INTRAVENOUS; SUBCUTANEOUS at 22:20

## 2022-07-11 RX ADMIN — METRONIDAZOLE 500 MG: 500 INJECTION, SOLUTION INTRAVENOUS at 15:20

## 2022-07-11 RX ADMIN — ACETYLCYSTEINE 2 ML: 200 SOLUTION ORAL; RESPIRATORY (INHALATION) at 13:24

## 2022-07-11 RX ADMIN — LEVALBUTEROL HYDROCHLORIDE 1.25 MG: 1.25 SOLUTION RESPIRATORY (INHALATION) at 08:36

## 2022-07-11 RX ADMIN — PREDNISONE 17.5 MG: 2.5 TABLET ORAL at 17:25

## 2022-07-11 RX ADMIN — INSULIN ASPART 5 UNITS: 100 INJECTION, SOLUTION INTRAVENOUS; SUBCUTANEOUS at 04:15

## 2022-07-11 RX ADMIN — MIDODRINE HYDROCHLORIDE 30 MG: 5 TABLET ORAL at 15:20

## 2022-07-11 RX ADMIN — VALGANCICLOVIR HYDROCHLORIDE 450 MG: 50 POWDER, FOR SOLUTION ORAL at 08:07

## 2022-07-11 RX ADMIN — ACETAMINOPHEN 975 MG: 325 TABLET, FILM COATED ORAL at 11:23

## 2022-07-11 RX ADMIN — NYSTATIN 1000000 UNITS: 100000 SUSPENSION ORAL at 08:06

## 2022-07-11 RX ADMIN — CALCIUM CARBONATE 600 MG (1,500 MG)-VITAMIN D3 400 UNIT TABLET 1 TABLET: at 08:08

## 2022-07-11 RX ADMIN — TACROLIMUS 9 MG: 5 CAPSULE ORAL at 08:07

## 2022-07-11 ASSESSMENT — ACTIVITIES OF DAILY LIVING (ADL)
ADLS_ACUITY_SCORE: 34

## 2022-07-11 NOTE — PROGRESS NOTES
CV ICU PROGRESS NOTE  07/11/2022        CO-MORBIDITIES:   HTN, HFpEF, COPD, HCV    ASSESSMENT: Sofie Rodriguez is a 62 year old female with PMH of oxygen-dependent COPD, HFpEF, HTN, HCV, and osteopenia who underwent bilateral lung transplant on 6/28/22 with Dr. Sunshine. This was complicated by left upper extremity acute limb ischemia s/p left radial thrombectomy on 7/1/22. She remains critically ill with worsening renal injury.      PLAN SUMMARY 7/11:  - Continue Levaquin 500mg q48h (renal dosing)  - Discontinue fludrocortisone   - C.Diff lab  - HFNC at night  - Start glargine 15U daily        PLAN:  Neuro/ pain/ sedation:  Acute postoperative pain  - Monitor neurological status. Notify the MD for any acute changes in exam.  - Scheduled: acetaminophen, robaxin. PRN: oxycodone      Pulmonary:  #Postoperative ventilatory support  #Hypercarbic respiratory failure, improving   #Bilateral Lung Transplant  #Hx COPD  - Titrate FiO2 for SpO2 >92%  - Pulmonary hygiene: Incentive spirometer w/ flutter valve every 15- 30 minutes when awake  - Basiliximab POD #0 and #4, prednisolone 17.5mg, mycophenolate 1000mg BID, tacrolimus   - Valganciclovir  - Prednisone 17.5 BID  - Following ABG, daily   - tacrolimus level today AM  - HFNC for nighttime   - Bronchoscopy 7/12, to be coordinated by pulmonology      Cardiovascular:  #Hx HTN  #Hx HFpEF  #Distributive shock; improving  - Off norepinephrine gtt 7/10  - Midodrine 30 mg q8h  - Discontinue fludrocortisone  - Monitor hemodynamic status.   - Goal MAP >55     GI care/ Nutrition:   # Abdominal Pain  - Recent  KUB negative other than stool burden; frequent loose stools  - Continue probiotic, simethacone  - NJ TF @ goal, tolerating PO intake   - PPI: protonix  - Hold bowel regimen: miralax; PRN moM  - Cdiff testtoday     Renal/ Fluid Balance/ Electrolytes:   # Acute kidney injury 2/2 ischemic ATN  - Strict I/O, daily weights  - Avoid/limit nephrotoxins as able  - Replete lytes PRN per  protocol  - Nephrology following    - Urine sodium   - follow-up recs  - Discontinue 0.1 mg fludrocortisone (7/6-7/11)      Endocrine:    # Stress induced hyperglycemia  - High sliding scale insulin 7/2  - Lantus 15U daily      ID/ Antibiotics:  # Stress-induced leukocytosis  # Stenotrophomonas Maltophilia   - Continue to monitor fever curve, WBC and inflammatory markers as appropriate  - Prophylaxis: POD #8-90 nystatin, TMP/SMX (discontined due to BACILIO), valganciclovir  - Post-op abx: ceftazadime extended to 14 day coverage given elevated temp and WBCs  - Post-op cultures:   - Gram stain (1+ G+ cocci)   - Resp aerobic cx (4+ G- bacilli)   - Fungal cx (NGTD)   - AFB cx (NGTD)  - Discontinued Ceftazidime (stenotrophomonas) and Vancomycin for strep pneumo (7/7-7/9)  - Continue Levaquin 500mg q48h (renal dosing), 14 days course total for abx coverage      Heme:     #Acute blood loss anemia  #Left upper extremity acute limb ischemia s/p left radial thrombectomy on 7/1/22  - Transitioned from heparin gtt to SQH per vascular surgery  - Daily CBC  - Hgb 7.7 7/11        Prophylaxis:    - DVT: mechanical +SQH  - PPI   - PT/OT     Lines/ tubes/ drains:  - CTs x5, RIJ, PIV x2, a-line (keep w/ recent medication changes)  - 4 Pleural, 1 med     Disposition:  - CVICU    Patient seen, findings and plan discussed with CV ICU staff, Dr. Leon.    Dominik Sesay MD  Anesthesiology, PGY3  [facility] CV ICU     ====================================    SUBJECTIVE:   NAEON. Continues to have some abdominal discomfort. Off pressors. Tolerating PO intake. Progressive worsening of renal indices, increasing creatinine, BMP. K and Na wnl. Low urine output.    OBJECTIVE:   1. VITAL SIGNS:   Temp:  [97.7  F (36.5  C)-98.1  F (36.7  C)] 97.8  F (36.6  C)  Pulse:  [] 79  Resp:  [7-33] 8  MAP:  [59 mmHg-86 mmHg] 63 mmHg  Arterial Line BP: ()/(38-65) 101/43  SpO2:  [85 %-100 %] 98 %  Resp: 8      2. INTAKE/ OUTPUT:   I/O last 3 completed  shifts:  In: 2241.17 [P.O.:120; I.V.:574.67; NG/GT:811.5]  Out: 1010 [Urine:270; Chest Tube:740]    3. PHYSICAL EXAMINATION:   General: spine in bed, comfortable follows commands  Neuro: grossly intact, A&Ox3, conversational, appropriate  Resp: on 1L NC  CV: RRR   Abdomen: Soft, mild tenderness w/ deep palpation, non-peritonitic   Incisions: c/d/i  Extremities: warm and well perfused    4. INVESTIGATIONS:   Arterial Blood Gases   Recent Labs   Lab 07/10/22  0640 07/09/22  0401 07/08/22 2027 07/08/22  1446   PH 7.39 7.29* 7.29* 7.25*   PCO2 75* 85* 86* 96*   PO2 132* 100 114* 131*   HCO3 45* 41* 41* 42*     Complete Blood Count   Recent Labs   Lab 07/11/22  0438 07/10/22  0427 07/09/22  1728 07/09/22  0401   WBC 18.4* 17.9* 19.1* 16.5*  16.5*   HGB 7.7* 7.9* 8.4* 7.9*  7.9*    345 339 302  302     Basic Metabolic Panel  Recent Labs   Lab 07/11/22  0438 07/11/22  0412 07/11/22  0033 07/10/22  2046 07/10/22  1608 07/10/22  0750 07/10/22  0427 07/09/22 2055 07/09/22  1728     --   --   --  137  --  138  --  141   POTASSIUM 4.4  --   --   --  4.1  --  4.2  --  4.3   CHLORIDE 93*  --   --   --  91*  --  91*  --  94*   CO2 38*  --   --   --  40*  --  39*  --  37*   .0*  --   --   --  121.0*  --  114.0*  --  110.0*   CR 2.78*  --   --   --  2.53*  --  2.39*  --  2.18*   * 253* 272* 227* 275*   < > 198*   < > 183*    < > = values in this interval not displayed.     Liver Function Tests  Recent Labs   Lab 07/11/22  0438 07/07/22  0323 07/06/22  0337   AST 16 23 24   ALT 22 24 22   ALKPHOS 78 63 59   BILITOTAL <0.2 <0.2 <0.2   ALBUMIN 2.7* 2.8*  --      Pancreatic Enzymes  Recent Labs   Lab 07/08/22  0332   LIPASE 17     Coagulation Profile  No lab results found in last 7 days.      5. RADIOLOGY:   Recent Results (from the past 24 hour(s))   XR Chest Port 1 View    Narrative    Chest one view portable spine    HISTORY: Chest tubes and right hydropneumothorax status post bilateral  lung  transplant    COMPARISON STUDY: 7/9/2022    FINDINGS: Cardiac silhouette is nonenlarged. Surgical changes  bilateral lung transplant. Right IJ sheath. Feeding tube collimated  off film in the abdomen. Bilateral chest tubes and mediastinal drain.  Small pleural effusions tracking the apices. Left basilar atelectasis  and consolidation. Right axillary PICC. Tiny bibasilar pneumothoraces.      Impression    IMPRESSION: Tiny bibasilar pneumothoraces. Interstitial opacities  bilaterally compatible with pulmonary edema. Left pleural effusion  with basilar atelectasis and consolidation.    JOHANN MORAES MD         SYSTEM ID:  [id number]   XR Chest Port 1 View    Narrative    EXAM: XR CHEST PORT 1 VIEW  7/11/2022 5:40 AM     HISTORY:  chest tubes and R hydropneumothorax s/p bilateral lung  transplant       COMPARISON:  7/10/2022    FINDINGS: Single view of the chest. Postsurgical changes of the chest  with intact clam shell sternotomy wires. Enteric tube courses below  the diaphragm and beyond the field-of-view. Right IJ central venous  catheter tip projects over the SVC. Mediastinal drain and bibasilar  chest tubes.     Stable cardiomediastinal silhouette. Small bilateral pleural  effusions. Resolved right basilar hydropneumothorax. Improving left  basilar pneumothorax. Increased perihilar and bibasilar interstitial  and airspace opacities.       Impression    IMPRESSION:   1. Resolved right and improving left basilar pneumothorax.  2. Mildly increased perihilar and bibasilar opacities likely  representing pulmonary edema, atelectasis and/or infection.  3. Small pleural effusions, similar to prior.    I have personally reviewed the examination and initial interpretation  and I agree with the findings.    GABRIELLA SNELL MD         SYSTEM ID:  [id number]       =========================================

## 2022-07-11 NOTE — PROGRESS NOTES
Nephrology Progress Note  07/11/2022           Sofie Rodriguez is a 62 yom with hx of HTN, Hep C, osetopenia, previous polysubstance use (stopped 2019) and severe COPD who is s/p BSLT on 6/28/2022.  Had ~2.5h of bypass time, was uncomplicated but now has post op BACILIO in setting of continued need for vasopressors thought to be due to vasoplegia.  Had radial artery thrombus requiring thrombectomy on 7/2.  Nephrology consulted for BACILIO with mild hyperkalemia and oliguric UOP.       Interval History :   Mrs Higgins's Cr has been up and down since surgery and now up again in setting of high vanco level.  Stephanie high suggests against hypovolemia but reasonable to give IVF to match large GI output with diarrhea.  Will hold on RRT today but trend is concerning for need in the upcoming days.       Assessment & Recommendations:   BACILIO-Normal baseline renal fx historically and at time of surgery.  No renal imaging but low suspicion of obstruction so will consider if she does not show improvement.  BACILIO is likely hypoperfusion with ~2.5h of bypass time and tacrolimus although levels have been within range (needing higher doses of tacro).   Will manage this medically for now and continue to monitor.  UA initially showed hyaline casts on 6/28..  Noted that she did not get contrast for thrombectomy on 7/2.  Cr had started to improve but now again on the rise.                -BACILIO, likely hypoperfusion and need for tacro, also now with supratherapeutic vanco.                    -Continue to avoid nephrotoxins, norepi weaned off, would keep SBP >100 as able, hypotensive due to vasoplegia.                  -Cr up past few days after initial recovery, likely secondary to high vanco level.  Will follow closely.       Volume-Edematous and net positive 9L since surgery, UOP now oliguric.  Even today but mainly due to GI losses, C-diff being checked.      Electrolytes-K 4.4, bicarb 38, ABG 7.30/84/173/41     BMD-Ca up at 8.0, iCal WNL throughout,  "will follow.  No issues with Mg/Phos.       Lung Tx-On Tacro, levels have been within range, last level 4.8.  Has hypercapnia despite transplant and reasonable CXR/oxygenation, team expects this to improve with time.       Anemia-Hgb 7.4, acute management per team.       Nutrition-Started on TF, changed to renal TF     Time spent: 30 minutes on this date of encounter for chart review, physical exam, medical decision making and co-ordination of care.      Seen and discussed with Dr Bauman     Recommendations were communicated to primary team via verbal communication.        RUFUS Cedeño CNS  Clinical Nurse Specialist  [phone]      Review of Systems:   I reviewed the following systems:  Gen: No fevers or chills  CV: No CP at rest  Resp: No SOB at rest  GI: No N/V    Physical Exam:   I/O last 3 completed shifts:  In: 2513.17 [P.O.:120; I.V.:881.67; NG/GT:811.5]  Out: 1508 [Urine:368; Stool:250; Chest Tube:890]   /74 (BP Location: Right arm)   Pulse 97   Temp 97.9  F (36.6  C) (Oral)   Resp 12   Ht 1.575 m (5' 2\")   Wt 74.2 kg (163 lb 9.3 oz)   SpO2 98%   BMI 29.92 kg/m       GENERAL APPEARANCE: On NC, in mild distress.    EYES: No scleral icterus  NECK:  No JVD  Pulmonary: lungs clear to auscultation with equal breath sounds bilaterally, no clubbing or cyanosis  CV: Regular rhythm, normal rate, no rub   - Edema+2 generalized.   GI: soft, nontender, normal bowel sounds  MS: no evidence of inflammation in joints, no muscle tenderness  : + Dong  SKIN: no rash, warm, dry  NEURO: Answering questions appropriately, no focal deficits.      Labs:   All labs reviewed by me  Electrolytes/Renal - Recent Labs   Lab Test 07/11/22  0805 07/11/22  0438 07/11/22  0412 07/10/22  2046 07/10/22  1608 07/10/22  0750 07/10/22  0427 07/09/22  0404 07/09/22  0401   NA  --  139  --   --  137  --  138   < > 143   POTASSIUM  --  4.4  --   --  4.1  --  4.2   < > 4.5   CHLORIDE  --  93*  --   --  91*  --  91*   < > 96* "   CO2  --  38*  --   --  40*  --  39*   < > 39*   BUN  --  126.0*  --   --  121.0*  --  114.0*   < > 104.0*   CR  --  2.78*  --   --  2.53*  --  2.39*   < > 1.96*   * 233* 253*   < > 275*   < > 198*   < > 125*   ESTUARDO  --  8.4*  --   --  8.4*  --  8.5*   < > 8.4*   MAG  --  2.7*  --   --   --   --  2.9*  --  2.5*   PHOS  --  5.4*  --   --   --   --  5.4*  --  6.2*    < > = values in this interval not displayed.       CBC -   Recent Labs   Lab Test 07/11/22  0438 07/10/22  0427 07/09/22  1728   WBC 18.4* 17.9* 19.1*   HGB 7.7* 7.9* 8.4*    345 339       LFTs -   Recent Labs   Lab Test 07/11/22 0438 07/07/22  0323 07/06/22  0337 07/04/22  0323   ALKPHOS 78 63 59 60   BILITOTAL <0.2 <0.2 <0.2 <0.2   ALT 22 24 22 24   AST 16 23 24 20   PROTTOTAL 4.6* 4.6* 4.4* 4.4*   ALBUMIN 2.7* 2.8*  --  2.6*       Iron Panel - No lab results found.        Current Medications:    acetaminophen  975 mg Oral Q8H     acetylcysteine  2 mL Nebulization 4x Daily     aspirin  81 mg Oral Daily     calcium carbonate 600 mg-vitamin D 400 units  1 tablet Oral BID w/meals     fludrocortisone  0.1 mg Oral Daily     heparin ANTICOAGULANT  5,000 Units Subcutaneous Q8H TONY     insulin aspart  1-12 Units Subcutaneous Q4H     insulin glargine  35 Units Subcutaneous QAM AC     lactobacillus rhamnosus (GG)  1 capsule Oral BID     levalbuterol  1.25 mg Nebulization 4x Daily     levofloxacin  500 mg Intravenous Q48H     lidocaine  2 patch Transdermal Q24H     lidocaine   Transdermal Q8H TONY     midodrine  30 mg Oral Q8H     multivitamins w/minerals  15 mL Per Feeding Tube Daily     mycophenolate  1,000 mg Oral BID    Or     mycophenolate  1,000 mg Oral or NG Tube BID     nystatin   Topical BID     nystatin  1,000,000 Units Swish & Swallow 4x Daily     pantoprazole  40 mg Oral or Feeding Tube Daily     [Held by provider] polyethylene glycol  17 g Oral BID     predniSONE  17.5 mg Oral BID w/meals     protein modular  1 packet Per Feeding Tube  Daily     [Held by provider] senna-docusate  2 tablet Oral BID     sodium chloride (PF)  3 mL Intracatheter Q8H     [Held by provider] sulfamethoxazole-trimethoprim  1 tablet Oral or Feeding Tube Once per day on Mon Wed Fri     tacrolimus  9 mg Oral BID IS     thiamine  100 mg Intravenous Daily     valGANciclovir  450 mg Oral or NG Tube Once per day on Mon Thu       dextrose       heparin for  units in  mL (1 unit/mL for Interventional Radiology) 3 Units/hr (07/06/22 2016)     norepinephrine Stopped (07/10/22 0545)     sodium chloride 40 mL/hr at 07/11/22 0800

## 2022-07-11 NOTE — PLAN OF CARE
Goal Outcome Evaluation:    Plan of Care Reviewed With: patient, daughter     Overall Patient Progress: improving    Outcome Evaluation: pt's vitals stable without pressors. Continues to have abd pain and diarrhea.    ICU End of Shift Summary. See flowsheets for vital signs and detailed assessment.    Changes this shift: Patient's blood pressure remains stable without pressor medication. Still reliant on 1 lpm nc to maintain oxygen saturation. Incontinent overnight with multiple watery stools. Rectal tube placed after request from team due to significant perineal rash, discomfort, and skin breakdown. Abdominal pressure monitoring was unremarkable. Increased output through chest tubes and decreased urine output overnight despite 40 ml/hr IV maintenance infusion. Urine output roughly 20-25 mlsq2.     Plan: Assess kidney function in light of increasing creatinine and decreased urine output. Assess abdominal pain and diarrhea. Up to chair and hourly incentive spirometer.

## 2022-07-11 NOTE — PROGRESS NOTES
Pulmonary Medicine  Cystic Fibrosis - Lung Transplant Team  Progress Note  2022       Patient: Sofie Rodriguez  MRN: [id number]  : 1960 (age 62 year old)  Transplant: 2022 (Lung), POD#13  Admission date: 2022    Assessment & Plan:     Sofie Rodriguez is a 62 year old female with a PMH significant for end stage COPD, HTN, HFpEF, Mycobacterium peregrinum colonization, h/o hepatitis C, HECTOR s/p LEEP procedure, osteopenia, and former methamphetamine use.  Pt. is now s/p BSLT on 22.  Surgery on CPB, lungs slightly undersizedt.   Required moderate volume resuscitation with low dose pressors post-op.  Extubated .  Persistent hypercapnia, limited improvement with BiPAP.  Etiology unclear but appears to be respiratory drive problem.  Persistent low dose pressor requirement of unclear etiology, weaned off 7/10, remains on scheduled midodrine.  Post-op course also notable for left radial artery thrombus (presumed secondary to arterial line) s/p thrombectomy , BACILIO, and positive C diff ().     Today's recommendations:  - DSA, EBV, IgG, and donor risk labs ordered   - Trial of HFNC overnight given persistent hypercapnia, ABG daily for now  - CXR daily as below  - Repeat inspection bronch tomorrow (working on scheduling with our team, defer NPO timing to Dr. Miller)   - Tacrolimus level supratherapeutic (awaiting peak level today), hold further doses, repeat level ordered tomorrow for ability to resume at lower dose  - Will clarify plan for prednisone taper tomorrow  - Continue to hold Bactrim for PJP ppx given BACILIO  - Send AFB cultures with all future bronchs  - Continue levofloxacin for now, revisit duration tomorrow  - C diff positive, PO vancomycin per ICU team     S/p bilateral sequential lung transplant (BSLT) for end stage COPD:  Persistent hypercapneic respiratory failure:   Persistent hypotension:   Bilateral hydroPTX: Explant pathology with severe emphysema with subpleural bullae  formation, changes of chronic bronchitis, subpleural scars and patchy pulmonary edema, benign hilar lymph nodes.  No evidence of PGD post-op.  Bronch (6/29) with minimal mucoid secretions t/o right, left anastomosis with slight erythema, and cloudy mucoid secretions removed from LLL.  Extubated 6/30.  Persistent hypercapnia although patient is not very tachypneic/dyspneic so it appears to be a respiratory drive problem.  Limited improvement with BiPAP, discontinued 7/8.  Diaphragm assessment by US did not show dysfunction (per CVICU team) and metabolic cart study only 6 mins but not supportive of overfeeding.  Chest CT (7/7) with bilateral small hydroPTX, chest wall subcutaneous emphysema, air collection of right chest wall anteriorly associated with pleura on right, and some narrowing of left sided anastomosis.  Persistent low dose pressor requirement, weaned off 7/10, remains on scheduled midodrine (s/p hydrocortisone 7/6-7/9 and fludrocortisone 7/6-7/11).    - DSA one month post-transplant (7/28, ordered)  - Ammonia monitoring twice weekly x3 weeks (screening for hyperammonemia post-lung transplant), ammonia 25 on 7/11  - Nebs: levalbuterol and Mucomyst QID   - Pulmonary toilet with chest physiotherapy QID along with Aerobika and incentive spirometry hourly while awake  - Supplemental oxygen as needed to maintain SpO2 >92%, trial of HFNC overnight given persistent hypercapnia (with limited improvement/tolerance to BiPAP previously), ABG daily for now  - Chest tubes managed by surgical team  - Daily CXR, today with resolved right and improving left basilar PTX, small pleural effusions, and mildly increased perihilar and bibasilar opacities (personally reviewed)  - Repeat inspection bronch 7/12 (working on scheduling with our team, defer NPO timing to Dr. Miller) due to CXR changes and weak cough     Immunosuppression:  Induction therapy with basiliximab (and high dose IV steroid) given intraoperatively, repeating  basiliximab dose on POD#4.  - Tacrolimus 9 mg BID (increased 7/8).  Goal level 8-12.  Level 7/11 supratherapeutic at 18.6 (peak level pending), hold further doses for now, repeat level 7/12 (ordered) for ability to resume at lower dose.  - MMF 1000 mg BID (decreased 7/10 due to diarrhea)  - Prednisone 17.5 mg BID (resumed 7/9, held while on hydrocortisone as above), will discuss plan for taper 7/12 as not yet tapered to 15 mg BID previously due on 7/7  Date AM dose (mg) PM dose (mg)   7/7/22 15 15   7/14/22 15 12.5   7/21/22 12.5 12.5   8/4/22 12.5 10   8/18/22 10 10   9/15/22 10 7.5   10/13/22 7.5 7.5   11/10/22 7.5 5   12/8/22 5 5   1/5/23 5 2.5      Prophylaxis:   - Bactrim for PJP ppx on hold since 7/7 for BACILIO (G6PD 14.1 on 6/14 if need to consider alternative)  - VGCV for CMV ppx (started 7/7)  - Nystatin for oral candidiasis ppx, 6 month course  - See below for serologies and viral ppx:    Donor Recipient Intervention   CMV status Positive Positive Valganciclovir POD #8-90   EBV status Positive Positive EBV check monthly (7/28, ordered)   HSV status N/A Positive Not indicated     ID: IgG adequate (1,381) at time of transplant.  Prior history of colonization with Mycobacterium peregrinum.  Donor bronch cultures (OSH) with Strep beta hemolytic, not group A.  Febrile to 100.6 (6/30), resolved within 24 hours.  Blood cultures most recently negative 7/4.  - IgG at one month (7/28, ordered)  - AFB bronch culture (6/28, 6/29) NGTD, AFB to be sent on all future bronchs    Streptococcus pneumoniae:  Stenotrophomonas maltophilia: Noted in recipient cultures at time of transplant.  S/p ceftazidime 6/28-7/10 and vancomycin 7/7-7/8 and 6/28-6/30.   - ABX: IV levofloxacin (7/10), continue for now with likely plan for total 2 week course, will reevaluate 7/12    H/o hepatitis C: Diagnosed in 1980s, 2 mos of treatment, quant negative since 10/2017, last positive 2/20/17 ([phone]).  Glenis positive on 6/2021 with negative HCV  "PCR.  H/o remote EtOH abuse.  MR elastography (4/27/21) with hepatology review and consult without any concerns post transplant.  Hepatitis C RNA negative and hepatitis C antibody positive (old) on 6/28.    PHS risk criteria donor:  Additional labs required post-transplant (between 4-8 weeks post-op): Hepatitis B, Hepatitis C, and HIV by SHERI ([id number], ordered 7/28).    Other relevant problems managed by primary team:    Left hand ischemia: Radial artery thrombosis identified on duplex doppler.  S/p thrombectomy on 7/2.  Completed high intensity heparin.  Continue daily aspirin.    BACILIO:   Hyperkalemia: Likely multifactorial including Bactrim and hypotension.  Tacrolimus now supratherapeutic 7/11 (previously subtherapeutic).  Volume status is unclear.  Hypotension and rising Cr suggest volume depletion but intake has generally been greater than output each day and patient does have some dependent edema.  Nephrology consulted, following.  Fludrocortisone started 7/6 as above and also in setting of hyperkalemia (K+ normal since 7/7).    - Tacrolimus monitoring as above  - Monitor potassium with stopping Florinef as above  - Volume management per nephrology and ICU team, revisit need for dialysis daily per nephrology      Abdominal pain:  Diarrhea:   C diff positive: Patient reports \"crampy\" abdominal pain since at least 7/8.  AXR 7/8 without dilated bowel, moderate colonic stool burden.  Also with diarrhea, rectal tube placed overnight 7/10 to 7/11.  C diff positive 7/11.  - PO vancomycin (7/11) per ICU team    History of steroid induced hyperglycemia: Well controlled as OP.  - May need endocrine consult prior to discharge    We appreciate the excellent care provided by the CVICU and CVTS teams.  Recommendations communicated via in person rounding and this note.  Will continue to follow along closely, please do not hesitate to call with any questions or concerns.    Patient seen and discussed with Dr. Mela Horvath " "[facility]  [facility]  Pulmonary CF/Transplant     Subjective & Interval History:     Remains on 1L NC continuously, has not used BiPAP overnight since 7/8.  Denies dyspnea.  Cough nonproductive, received chest physiotherapy BID yesterday.  Ongoing abdominal pain, associated with having bowel movement.  Rectal tube placed overnight.  Also with intermittent nausea, no emesis.      Review of Systems:     C: No fever, no chills, + decreased weight  INTEGUMENTARY/SKIN: No rash or obvious new lesions  ENT/MOUTH: No nasal drainage  RESP: See interval history  CV: No chest pain, + peripheral edema  GI: See interval history  : + Dong  MUSCULOSKELETAL: No myalgias, no arthralgias  ENDOCRINE: + blood sugars intermittently elevated  NEURO: No headache  PSYCHIATRIC: Mood stable    Physical Exam:     All notes, images, and labs from past 24 hours (at minimum) were reviewed.    Vital signs:  Temp: 97.6  F (36.4  C) Temp src: Oral   Pulse: 79   Resp: 12 SpO2: 97 % O2 Device: Nasal cannula Oxygen Delivery: 1 LPM Height: 157.5 cm (5' 2\") Weight: 74.2 kg (163 lb 9.3 oz)  I/O:     Intake/Output Summary (Last 24 hours) at 7/11/2022 1502  Last data filed at 7/11/2022 1500  Gross per 24 hour   Intake 2433.67 ml   Output 2143 ml   Net 290.67 ml     Constitutional: Lying in bed, in no apparent distress.   HEENT: Eyes with pink conjunctivae, anicteric.  Oral mucosa moist without lesions.  Nasal FT.  PULM: Fair air flow bilaterally.  No crackles, no rhonchi, no wheezes.  Non-labored breathing on 1L NC.  CV: Normal S1 and S2.  RRR.  + systolic murmur.  2+ BLE edema.   ABD: NABS, softly distended, + mildly tender t/o.  MSK: Moves all extremities.  No apparent muscle wasting.   NEURO: Alert, minimally conversant.   SKIN: Warm, dry.  No rash on limited exam.   PSYCH: Mood stable, calm.     Lines, Drains, and Devices:  Arterial Line 07/05/22 Brachial (Active)   Site Assessment WDL 07/11/22 1200   Line Status Pulsatile blood flow 07/11/22 " 1200   Arterine Line Cap Change Due 07/12/22 07/11/22 1200   Art Line Waveform Appropriate;Square wave test performed 07/11/22 1200   Art Line Interventions Leveled;Connections checked and tightened;Flushed per protocol 07/11/22 1200   Color/Movement/Sensation Capillary refill less than 3 sec 07/11/22 1200   Line Necessity Yes, meets criteria 07/11/22 1200   Dressing Type Transparent 07/11/22 1200   Dressing Status Clean, dry, intact 07/11/22 1200   Dressing Intervention Dressing changed/new dressing 07/09/22 2200   Dressing Change Due 07/17/22 07/11/22 1200   Number of days: 6       CVC Double Lumen Right Internal jugular (Active)   Site Assessment WD 07/11/22 1200   Dressing Type Chlorhexidine disk;Transparent 07/11/22 1200   Dressing Status clean;dry;intact 07/11/22 1200   Dressing Intervention new dressing 07/09/22 2200   Dressing Change Due 07/17/22 07/11/22 1200   Tubing Change secondary tubing;primary tubing 06/29/22 0400   Line Necessity yes, meets criteria 07/11/22 1200   Brown - Status infusing 07/11/22 1200   Brown - Cap Change Due 07/12/22 07/11/22 1200   White - Status infusing 07/11/22 1200   White - Cap Change Due 07/12/22 07/11/22 1200   Phlebitis Scale 0-->no symptoms 07/11/22 1200   Infiltration? no 07/11/22 1200   Infiltration Scale 0 07/11/22 1200   Infiltration Site Treatment Method  None 07/11/22 1200   Was a vesicant infusing? no 07/11/22 1200   CVC Comment MAC Capped 07/09/22 1600   Number of days: 13       Left Radial Interventional Procedure Access (Active)   Site Assessment WDL 07/11/22 0800   Hemostasis management Unchanged 07/09/22 1600   CMS Left Arm WDL 07/11/22 0800   Radial Pulse - Left Arm Normal 07/10/22 1600   Number of days: 9     Data:     LABS    CMP:   Recent Labs   Lab 07/11/22  1122 07/11/22  0805 07/11/22  0438 07/11/22  0412 07/10/22  2046 07/10/22  1608 07/10/22  0750 07/10/22  0427 07/09/22  2055 07/09/22  1728 07/09/22  0404 07/09/22  0401 07/08/22  0336 07/08/22  0332  07/07/22 0406 07/07/22  0323 07/06/22 0405 07/06/22  0337   NA  --   --  139  --   --  137  --  138  --  141  --  143   < > 140   < > 136   < > 132*   POTASSIUM  --   --  4.4  --   --  4.1  --  4.2  --  4.3  --  4.5   < > 4.0   < > 4.5   < > 5.2  5.5*   CHLORIDE  --   --  93*  --   --  91*  --  91*  --  94*  --  96*   < > 94*   < > 90*   < > 98   CO2  --   --  38*  --   --  40*  --  39*  --  37*  --  39*   < > 39*   < > 32*   < > 28   ANIONGAP  --   --  8  --   --  6*  --  8  --  10  --  8   < > 7   < > 14   < > 6*   * 218* 233* 253*   < > 275*   < > 198*   < > 183*   < > 125*   < > 168*   < > 183*   < > 244*   BUN  --   --  126.0*  --   --  121.0*  --  114.0*  --  110.0*  --  104.0*   < > 97.0*   < > 94.8*   < > 82.7*   CR  --   --  2.78*  --   --  2.53*  --  2.39*  --  2.18*  --  1.96*   < > 1.77*   < > 1.99*   < > 1.98*   GFRESTIMATED  --   --  19*  --   --  21*  --  23*  --  25*  --  29*   < > 32*   < > 28*   < > 28*   ESTUARDO  --   --  8.4*  --   --  8.4*  --  8.5*  --  8.7*  --  8.4*   < > 8.4*   < > 8.1*   < > 8.0*   MAG  --   --  2.7*  --   --   --   --  2.9*  --   --   --  2.5*  --  2.5*  --  2.6*  --  2.9*   PHOS  --   --  5.4*  --   --   --   --  5.4*  --   --   --  6.2*  --  4.6*  --  5.6*  --  3.5   PROTTOTAL  --   --  4.6*  --   --   --   --   --   --   --   --   --   --   --   --  4.6*  --  4.4*   ALBUMIN  --   --  2.7*  --   --   --   --   --   --   --   --   --   --   --   --  2.8*  --   --    BILITOTAL  --   --  <0.2  --   --   --   --   --   --   --   --   --   --   --   --  <0.2  --  <0.2   ALKPHOS  --   --  78  --   --   --   --   --   --   --   --   --   --   --   --  63  --  59   AST  --   --  16  --   --   --   --   --   --   --   --   --   --   --   --  23  --  24   ALT  --   --  22  --   --   --   --   --   --   --   --   --   --   --   --  24  --  22    < > = values in this interval not displayed.     CBC:   Recent Labs   Lab 07/11/22  4128 07/10/22  9167 07/09/22  6276  07/09/22  0401   WBC 18.4* 17.9* 19.1* 16.5*  16.5*   RBC 2.47* 2.56* 2.68* 2.60*  2.60*   HGB 7.7* 7.9* 8.4* 7.9*  7.9*   HCT 25.5* 26.0* 27.2* 26.5*  26.5*   * 102* 102* 102*  102*   MCH 31.2 30.9 31.3 30.4  30.4   MCHC 30.2* 30.4* 30.9* 29.8*  29.8*   RDW 16.5* 16.1* 16.3* 16.3*  16.3*    345 339 302  302       INR: No lab results found in last 7 days.    Glucose:   Recent Labs   Lab 07/11/22  1122 07/11/22  0805 07/11/22  0438 07/11/22  0412 07/11/22  0033 07/10/22  2046   * 218* 233* 253* 272* 227*       Blood Gas:   Recent Labs   Lab 07/11/22  0612 07/10/22  0640 07/09/22  0401 07/05/22  1435 07/05/22  1207 07/05/22  0345 07/04/22  1944   PHV  --   --   --   --  7.20* 7.27* 7.24*   PCO2V  --   --   --   --  81* 74* 77*   PO2V  --   --   --   --  47 41 41   HCO3V  --   --   --   --  32* 34* 33*   LINDY  --   --   --   --  3.1* 6.2* 4.5*   O2PER 21 24 21   < > 4 25 25    < > = values in this interval not displayed.       Culture Data No results for input(s): CULT in the last 168 hours.    Virology Data:   Lab Results   Component Value Date    FLUAH1 Not Detected 06/29/2022    FLUAH3 Not Detected 06/29/2022    VT0239 Not Detected 06/29/2022    IFLUB Not Detected 06/29/2022    RSVA Not Detected 06/29/2022    RSVB Not Detected 06/29/2022    PIV1 Not Detected 06/29/2022    PIV2 Not Detected 06/29/2022    PIV3 Not Detected 06/29/2022    HMPV Not Detected 06/29/2022       Historical CMV results (last 3 of prior testing):  No results found for: CMVQNT  No results found for: CMVLOG    Urine Studies    Recent Labs   Lab Test 07/08/22  0831 07/05/22  1004   URINEPH 5.5 5.5   NITRITE Negative Negative   LEUKEST Negative Negative   WBCU 1 5       Most Recent [facility] Pulmonary Function Testing (FVC/FEV1 only)  FVC-Pre   Date Value Ref Range Status   04/29/2022 1.82 L    11/11/2021 2.17 L    06/14/2021 2.00 L      FVC-%Pred-Pre   Date Value Ref Range Status   04/29/2022 58 %    11/11/2021 70 %     06/14/2021 64 %      FEV1-Pre   Date Value Ref Range Status   04/29/2022 0.51 L    11/11/2021 0.53 L    06/14/2021 0.54 L      FEV1-%Pred-Pre   Date Value Ref Range Status   04/29/2022 20 %    11/11/2021 21 %    06/14/2021 21 %        IMAGING    Recent Results (from the past 48 hour(s))   XR Chest Port 1 View    Narrative    Chest one view portable spine    HISTORY: Chest tubes and right hydropneumothorax status post bilateral  lung transplant    COMPARISON STUDY: 7/9/2022    FINDINGS: Cardiac silhouette is nonenlarged. Surgical changes  bilateral lung transplant. Right IJ sheath. Feeding tube collimated  off film in the abdomen. Bilateral chest tubes and mediastinal drain.  Small pleural effusions tracking the apices. Left basilar atelectasis  and consolidation. Right axillary PICC. Tiny bibasilar pneumothoraces.      Impression    IMPRESSION: Tiny bibasilar pneumothoraces. Interstitial opacities  bilaterally compatible with pulmonary edema. Left pleural effusion  with basilar atelectasis and consolidation.    JOHANN MORAES MD         SYSTEM ID:  [id number]   XR Chest Port 1 View    Narrative    EXAM: XR CHEST PORT 1 VIEW  7/11/2022 5:40 AM     HISTORY:  chest tubes and R hydropneumothorax s/p bilateral lung  transplant       COMPARISON:  7/10/2022    FINDINGS: Single view of the chest. Postsurgical changes of the chest  with intact clam shell sternotomy wires. Enteric tube courses below  the diaphragm and beyond the field-of-view. Right IJ central venous  catheter tip projects over the SVC. Mediastinal drain and bibasilar  chest tubes.     Stable cardiomediastinal silhouette. Small bilateral pleural  effusions. Resolved right basilar hydropneumothorax. Improving left  basilar pneumothorax. Increased perihilar and bibasilar interstitial  and airspace opacities.       Impression    IMPRESSION:   1. Resolved right and improving left basilar pneumothorax.  2. Mildly increased perihilar and bibasilar opacities  likely  representing pulmonary edema, atelectasis and/or infection.  3. Small pleural effusions, similar to prior.    I have personally reviewed the examination and initial interpretation  and I agree with the findings.    GABRIELLA SNELL MD         SYSTEM ID:  [id number]

## 2022-07-11 NOTE — PLAN OF CARE
ICU End of Shift Summary. See flowsheets for vital signs and detailed assessment.    Changes this shift: No acute events this shift. A&Ox4. Afebrile. Maintained MAP > 55. SR. 1-2L NC.CT x3. NJ with TF at goal. Regular diet; not endorsing any hunger. Dong with low UOP. IAP 3s. Stool via rectal tube. Cdif positive this shift.     Plan: NPO at 0600 for bronch tomorrow at 1200 (Mycophenolate, prednisone, and midodrine okay to give at 8AM scheduled per order). HFNC @ night. Continue POC.     Goal Outcome Evaluation:    Plan of Care Reviewed With: patient     Overall Patient Progress: improving

## 2022-07-12 ENCOUNTER — APPOINTMENT (OUTPATIENT)
Dept: GENERAL RADIOLOGY | Facility: CLINIC | Age: 62
DRG: 007 | End: 2022-07-12
Attending: THORACIC SURGERY (CARDIOTHORACIC VASCULAR SURGERY)
Payer: MEDICARE

## 2022-07-12 ENCOUNTER — APPOINTMENT (OUTPATIENT)
Dept: GENERAL RADIOLOGY | Facility: CLINIC | Age: 62
DRG: 007 | End: 2022-07-12
Attending: STUDENT IN AN ORGANIZED HEALTH CARE EDUCATION/TRAINING PROGRAM
Payer: MEDICARE

## 2022-07-12 PROBLEM — N17.0 ACUTE KIDNEY FAILURE WITH TUBULAR NECROSIS (H): Status: ACTIVE | Noted: 2022-07-12

## 2022-07-12 LAB
ANION GAP SERPL CALCULATED.3IONS-SCNC: 7 MMOL/L (ref 7–15)
ANION GAP SERPL CALCULATED.3IONS-SCNC: 8 MMOL/L (ref 7–15)
ANION GAP SERPL CALCULATED.3IONS-SCNC: 9 MMOL/L (ref 7–15)
BASE EXCESS BLDA CALC-SCNC: 11 MMOL/L (ref -9–1.8)
BASE EXCESS BLDA CALC-SCNC: 11.5 MMOL/L (ref -9–1.8)
BASE EXCESS BLDA CALC-SCNC: 12.9 MMOL/L (ref -9–1.8)
BASE EXCESS BLDA CALC-SCNC: 8.7 MMOL/L (ref -9–1.8)
BASOPHILS # BLD AUTO: 0 10E3/UL (ref 0–0.2)
BASOPHILS NFR BLD AUTO: 0 %
BUN SERPL-MCNC: 134 MG/DL (ref 8–23)
BUN SERPL-MCNC: 134 MG/DL (ref 8–23)
BUN SERPL-MCNC: 143 MG/DL (ref 8–23)
CALCIUM SERPL-MCNC: 8.4 MG/DL (ref 8.8–10.2)
CALCIUM SERPL-MCNC: 8.4 MG/DL (ref 8.8–10.2)
CALCIUM SERPL-MCNC: 8.5 MG/DL (ref 8.8–10.2)
CHLORIDE SERPL-SCNC: 91 MMOL/L (ref 98–107)
CHLORIDE SERPL-SCNC: 92 MMOL/L (ref 98–107)
CHLORIDE SERPL-SCNC: 93 MMOL/L (ref 98–107)
CREAT SERPL-MCNC: 3.06 MG/DL (ref 0.51–0.95)
CREAT SERPL-MCNC: 3.11 MG/DL (ref 0.51–0.95)
CREAT SERPL-MCNC: 3.15 MG/DL (ref 0.51–0.95)
DEPRECATED HCO3 PLAS-SCNC: 35 MMOL/L (ref 22–29)
DEPRECATED HCO3 PLAS-SCNC: 37 MMOL/L (ref 22–29)
DEPRECATED HCO3 PLAS-SCNC: 38 MMOL/L (ref 22–29)
EOSINOPHIL # BLD AUTO: 0 10E3/UL (ref 0–0.7)
EOSINOPHIL NFR BLD AUTO: 0 %
ERYTHROCYTE [DISTWIDTH] IN BLOOD BY AUTOMATED COUNT: 16.3 % (ref 10–15)
GFR SERPL CREATININE-BSD FRML MDRD: 16 ML/MIN/1.73M2
GFR SERPL CREATININE-BSD FRML MDRD: 16 ML/MIN/1.73M2
GFR SERPL CREATININE-BSD FRML MDRD: 17 ML/MIN/1.73M2
GLUCOSE BLDC GLUCOMTR-MCNC: 149 MG/DL (ref 70–99)
GLUCOSE BLDC GLUCOMTR-MCNC: 166 MG/DL (ref 70–99)
GLUCOSE BLDC GLUCOMTR-MCNC: 167 MG/DL (ref 70–99)
GLUCOSE BLDC GLUCOMTR-MCNC: 189 MG/DL (ref 70–99)
GLUCOSE BLDC GLUCOMTR-MCNC: 201 MG/DL (ref 70–99)
GLUCOSE BLDC GLUCOMTR-MCNC: 234 MG/DL (ref 70–99)
GLUCOSE BLDC GLUCOMTR-MCNC: 266 MG/DL (ref 70–99)
GLUCOSE SERPL-MCNC: 195 MG/DL (ref 70–99)
GLUCOSE SERPL-MCNC: 201 MG/DL (ref 70–99)
GLUCOSE SERPL-MCNC: 253 MG/DL (ref 70–99)
HCO3 BLD-SCNC: 38 MMOL/L (ref 21–28)
HCO3 BLD-SCNC: 39 MMOL/L (ref 21–28)
HCO3 BLD-SCNC: 40 MMOL/L (ref 21–28)
HCO3 BLD-SCNC: 41 MMOL/L (ref 21–28)
HCT VFR BLD AUTO: 24.3 % (ref 35–47)
HGB BLD-MCNC: 7.1 G/DL (ref 11.7–15.7)
HGB BLD-MCNC: 7.2 G/DL (ref 11.7–15.7)
IMM GRANULOCYTES # BLD: 0.3 10E3/UL
IMM GRANULOCYTES NFR BLD: 2 %
KOH PREPARATION: NORMAL
KOH PREPARATION: NORMAL
LACTATE SERPL-SCNC: 0.6 MMOL/L (ref 0.7–2)
LYMPHOCYTES # BLD AUTO: 0.2 10E3/UL (ref 0.8–5.3)
LYMPHOCYTES NFR BLD AUTO: 1 %
MAGNESIUM SERPL-MCNC: 3.1 MG/DL (ref 1.7–2.3)
MCH RBC QN AUTO: 31.2 PG (ref 26.5–33)
MCHC RBC AUTO-ENTMCNC: 29.6 G/DL (ref 31.5–36.5)
MCV RBC AUTO: 105 FL (ref 78–100)
MONOCYTES # BLD AUTO: 0.8 10E3/UL (ref 0–1.3)
MONOCYTES NFR BLD AUTO: 5 %
NEUTROPHILS # BLD AUTO: 13.9 10E3/UL (ref 1.6–8.3)
NEUTROPHILS NFR BLD AUTO: 92 %
NRBC # BLD AUTO: 0 10E3/UL
NRBC BLD AUTO-RTO: 0 /100
O2/TOTAL GAS SETTING VFR VENT: 28 %
O2/TOTAL GAS SETTING VFR VENT: 28 %
O2/TOTAL GAS SETTING VFR VENT: 30 %
O2/TOTAL GAS SETTING VFR VENT: 30 %
OXYHGB MFR BLD: 98 % (ref 92–100)
PCO2 BLD: 77 MM HG (ref 35–45)
PCO2 BLD: 79 MM HG (ref 35–45)
PCO2 BLD: 87 MM HG (ref 35–45)
PCO2 BLD: 90 MM HG (ref 35–45)
PH BLD: 7.24 [PH] (ref 7.35–7.45)
PH BLD: 7.25 [PH] (ref 7.35–7.45)
PH BLD: 7.31 [PH] (ref 7.35–7.45)
PH BLD: 7.33 [PH] (ref 7.35–7.45)
PHOSPHATE SERPL-MCNC: 5.4 MG/DL (ref 2.5–4.5)
PLATELET # BLD AUTO: 286 10E3/UL (ref 150–450)
PO2 BLD: 126 MM HG (ref 80–105)
PO2 BLD: 132 MM HG (ref 80–105)
PO2 BLD: 85 MM HG (ref 80–105)
PO2 BLD: 91 MM HG (ref 80–105)
POTASSIUM SERPL-SCNC: 4.2 MMOL/L (ref 3.4–5.3)
POTASSIUM SERPL-SCNC: 4.4 MMOL/L (ref 3.4–5.3)
POTASSIUM SERPL-SCNC: 4.5 MMOL/L (ref 3.4–5.3)
RBC # BLD AUTO: 2.31 10E6/UL (ref 3.8–5.2)
SODIUM SERPL-SCNC: 136 MMOL/L (ref 136–145)
SODIUM SERPL-SCNC: 137 MMOL/L (ref 136–145)
SODIUM SERPL-SCNC: 137 MMOL/L (ref 136–145)
TACROLIMUS BLD-MCNC: 14.5 UG/L (ref 5–15)
TME LAST DOSE: NORMAL H
TME LAST DOSE: NORMAL H
WBC # BLD AUTO: 15.2 10E3/UL (ref 4–11)

## 2022-07-12 PROCEDURE — 999N000015 HC STATISTIC ARTERIAL MONITORING DAILY

## 2022-07-12 PROCEDURE — 85018 HEMOGLOBIN: CPT | Performed by: STUDENT IN AN ORGANIZED HEALTH CARE EDUCATION/TRAINING PROGRAM

## 2022-07-12 PROCEDURE — 250N000009 HC RX 250: Performed by: STUDENT IN AN ORGANIZED HEALTH CARE EDUCATION/TRAINING PROGRAM

## 2022-07-12 PROCEDURE — 250N000011 HC RX IP 250 OP 636: Performed by: SURGERY

## 2022-07-12 PROCEDURE — 87102 FUNGUS ISOLATION CULTURE: CPT | Performed by: INTERNAL MEDICINE

## 2022-07-12 PROCEDURE — 250N000009 HC RX 250

## 2022-07-12 PROCEDURE — 82803 BLOOD GASES ANY COMBINATION: CPT | Performed by: SURGERY

## 2022-07-12 PROCEDURE — 250N000012 HC RX MED GY IP 250 OP 636 PS 637: Performed by: STUDENT IN AN ORGANIZED HEALTH CARE EDUCATION/TRAINING PROGRAM

## 2022-07-12 PROCEDURE — 80048 BASIC METABOLIC PNL TOTAL CA: CPT | Performed by: STUDENT IN AN ORGANIZED HEALTH CARE EDUCATION/TRAINING PROGRAM

## 2022-07-12 PROCEDURE — 87070 CULTURE OTHR SPECIMN AEROBIC: CPT | Performed by: INTERNAL MEDICINE

## 2022-07-12 PROCEDURE — 250N000013 HC RX MED GY IP 250 OP 250 PS 637: Performed by: CLINICAL NURSE SPECIALIST

## 2022-07-12 PROCEDURE — 94640 AIRWAY INHALATION TREATMENT: CPT | Mod: 76

## 2022-07-12 PROCEDURE — 83605 ASSAY OF LACTIC ACID: CPT | Performed by: ANESTHESIOLOGY

## 2022-07-12 PROCEDURE — 250N000012 HC RX MED GY IP 250 OP 636 PS 637: Performed by: THORACIC SURGERY (CARDIOTHORACIC VASCULAR SURGERY)

## 2022-07-12 PROCEDURE — 83735 ASSAY OF MAGNESIUM: CPT | Performed by: SURGERY

## 2022-07-12 PROCEDURE — 250N000012 HC RX MED GY IP 250 OP 636 PS 637: Performed by: PHYSICIAN ASSISTANT

## 2022-07-12 PROCEDURE — 87116 MYCOBACTERIA CULTURE: CPT | Performed by: INTERNAL MEDICINE

## 2022-07-12 PROCEDURE — 84100 ASSAY OF PHOSPHORUS: CPT | Performed by: SURGERY

## 2022-07-12 PROCEDURE — 94640 AIRWAY INHALATION TREATMENT: CPT

## 2022-07-12 PROCEDURE — 87206 SMEAR FLUORESCENT/ACID STAI: CPT | Performed by: INTERNAL MEDICINE

## 2022-07-12 PROCEDURE — 31645 BRNCHSC W/THER ASPIR 1ST: CPT | Performed by: INTERNAL MEDICINE

## 2022-07-12 PROCEDURE — 250N000011 HC RX IP 250 OP 636: Performed by: STUDENT IN AN ORGANIZED HEALTH CARE EDUCATION/TRAINING PROGRAM

## 2022-07-12 PROCEDURE — 74018 RADEX ABDOMEN 1 VIEW: CPT

## 2022-07-12 PROCEDURE — 250N000013 HC RX MED GY IP 250 OP 250 PS 637: Performed by: SURGERY

## 2022-07-12 PROCEDURE — 80197 ASSAY OF TACROLIMUS: CPT | Performed by: PHYSICIAN ASSISTANT

## 2022-07-12 PROCEDURE — 250N000011 HC RX IP 250 OP 636

## 2022-07-12 PROCEDURE — 82803 BLOOD GASES ANY COMBINATION: CPT | Performed by: STUDENT IN AN ORGANIZED HEALTH CARE EDUCATION/TRAINING PROGRAM

## 2022-07-12 PROCEDURE — 250N000013 HC RX MED GY IP 250 OP 250 PS 637: Performed by: THORACIC SURGERY (CARDIOTHORACIC VASCULAR SURGERY)

## 2022-07-12 PROCEDURE — 250N000009 HC RX 250: Performed by: SURGERY

## 2022-07-12 PROCEDURE — 85025 COMPLETE CBC W/AUTO DIFF WBC: CPT | Performed by: SURGERY

## 2022-07-12 PROCEDURE — 99233 SBSQ HOSP IP/OBS HIGH 50: CPT | Mod: 24 | Performed by: PHYSICIAN ASSISTANT

## 2022-07-12 PROCEDURE — 250N000011 HC RX IP 250 OP 636: Performed by: INTERNAL MEDICINE

## 2022-07-12 PROCEDURE — 87210 SMEAR WET MOUNT SALINE/INK: CPT | Performed by: INTERNAL MEDICINE

## 2022-07-12 PROCEDURE — 99291 CRITICAL CARE FIRST HOUR: CPT | Mod: 24 | Performed by: ANESTHESIOLOGY

## 2022-07-12 PROCEDURE — 82805 BLOOD GASES W/O2 SATURATION: CPT | Performed by: STUDENT IN AN ORGANIZED HEALTH CARE EDUCATION/TRAINING PROGRAM

## 2022-07-12 PROCEDURE — 94668 MNPJ CHEST WALL SBSQ: CPT

## 2022-07-12 PROCEDURE — 31624 DX BRONCHOSCOPE/LAVAGE: CPT

## 2022-07-12 PROCEDURE — 0BC48ZZ EXTIRPATION OF MATTER FROM RIGHT UPPER LOBE BRONCHUS, VIA NATURAL OR ARTIFICIAL OPENING ENDOSCOPIC: ICD-10-PCS | Performed by: INTERNAL MEDICINE

## 2022-07-12 PROCEDURE — 200N000002 HC R&B ICU UMMC

## 2022-07-12 PROCEDURE — 250N000013 HC RX MED GY IP 250 OP 250 PS 637: Performed by: STUDENT IN AN ORGANIZED HEALTH CARE EDUCATION/TRAINING PROGRAM

## 2022-07-12 PROCEDURE — 71045 X-RAY EXAM CHEST 1 VIEW: CPT | Mod: 26 | Performed by: RADIOLOGY

## 2022-07-12 PROCEDURE — 94642 AEROSOL INHALATION TREATMENT: CPT

## 2022-07-12 PROCEDURE — 74018 RADEX ABDOMEN 1 VIEW: CPT | Mod: 26 | Performed by: RADIOLOGY

## 2022-07-12 PROCEDURE — 88312 SPECIAL STAINS GROUP 1: CPT | Mod: TC | Performed by: INTERNAL MEDICINE

## 2022-07-12 PROCEDURE — 258N000001 HC RX 258: Performed by: SURGERY

## 2022-07-12 PROCEDURE — 999N000157 HC STATISTIC RCP TIME EA 10 MIN

## 2022-07-12 PROCEDURE — 94660 CPAP INITIATION&MGMT: CPT

## 2022-07-12 PROCEDURE — 71045 X-RAY EXAM CHEST 1 VIEW: CPT

## 2022-07-12 PROCEDURE — 99233 SBSQ HOSP IP/OBS HIGH 50: CPT | Mod: 24 | Performed by: CLINICAL NURSE SPECIALIST

## 2022-07-12 RX ORDER — FENTANYL CITRATE 50 UG/ML
50 INJECTION, SOLUTION INTRAMUSCULAR; INTRAVENOUS ONCE
Status: COMPLETED | OUTPATIENT
Start: 2022-07-12 | End: 2022-07-12

## 2022-07-12 RX ORDER — HYDROMORPHONE HCL IN WATER/PF 6 MG/30 ML
0.2 PATIENT CONTROLLED ANALGESIA SYRINGE INTRAVENOUS ONCE
Status: COMPLETED | OUTPATIENT
Start: 2022-07-12 | End: 2022-07-12

## 2022-07-12 RX ORDER — LIDOCAINE HYDROCHLORIDE 10 MG/ML
INJECTION, SOLUTION EPIDURAL; INFILTRATION; INTRACAUDAL; PERINEURAL
Status: COMPLETED
Start: 2022-07-12 | End: 2022-07-12

## 2022-07-12 RX ORDER — DEXMEDETOMIDINE HYDROCHLORIDE 4 UG/ML
.3-.5 INJECTION, SOLUTION INTRAVENOUS CONTINUOUS
Status: DISCONTINUED | OUTPATIENT
Start: 2022-07-12 | End: 2022-07-13

## 2022-07-12 RX ORDER — FUROSEMIDE 10 MG/ML
120 INJECTION INTRAMUSCULAR; INTRAVENOUS ONCE
Status: DISCONTINUED | OUTPATIENT
Start: 2022-07-12 | End: 2022-07-12

## 2022-07-12 RX ORDER — FUROSEMIDE 10 MG/ML
120 INJECTION INTRAMUSCULAR; INTRAVENOUS EVERY 6 HOURS
Status: COMPLETED | OUTPATIENT
Start: 2022-07-12 | End: 2022-07-12

## 2022-07-12 RX ORDER — SODIUM BICARBONATE 650 MG/1
1300 TABLET ORAL 3 TIMES DAILY
Status: DISCONTINUED | OUTPATIENT
Start: 2022-07-12 | End: 2022-07-14

## 2022-07-12 RX ORDER — LIDOCAINE HYDROCHLORIDE 40 MG/ML
SOLUTION TOPICAL
Status: COMPLETED
Start: 2022-07-12 | End: 2022-07-12

## 2022-07-12 RX ORDER — VANCOMYCIN HYDROCHLORIDE 50 MG/ML
125 KIT ORAL EVERY 6 HOURS SCHEDULED
Status: DISCONTINUED | OUTPATIENT
Start: 2022-07-12 | End: 2022-07-26

## 2022-07-12 RX ORDER — FENTANYL CITRATE 50 UG/ML
25 INJECTION, SOLUTION INTRAMUSCULAR; INTRAVENOUS EVERY 5 MIN PRN
Status: DISCONTINUED | OUTPATIENT
Start: 2022-07-12 | End: 2022-07-12

## 2022-07-12 RX ORDER — FUROSEMIDE 10 MG/ML
120 INJECTION INTRAMUSCULAR; INTRAVENOUS ONCE
Status: COMPLETED | OUTPATIENT
Start: 2022-07-12 | End: 2022-07-12

## 2022-07-12 RX ORDER — MYCOPHENOLATE MOFETIL 200 MG/ML
1000 POWDER, FOR SUSPENSION ORAL 2 TIMES DAILY
Status: DISCONTINUED | OUTPATIENT
Start: 2022-07-12 | End: 2022-07-23

## 2022-07-12 RX ORDER — LIDOCAINE HYDROCHLORIDE 20 MG/ML
SOLUTION OROPHARYNGEAL
Status: COMPLETED
Start: 2022-07-12 | End: 2022-07-12

## 2022-07-12 RX ORDER — MIDODRINE HYDROCHLORIDE 5 MG/1
20 TABLET ORAL EVERY 8 HOURS
Status: DISCONTINUED | OUTPATIENT
Start: 2022-07-12 | End: 2022-07-15

## 2022-07-12 RX ADMIN — LEVALBUTEROL HYDROCHLORIDE 1.25 MG: 1.25 SOLUTION RESPIRATORY (INHALATION) at 11:41

## 2022-07-12 RX ADMIN — MIDAZOLAM 1 MG: 1 INJECTION INTRAMUSCULAR; INTRAVENOUS at 12:58

## 2022-07-12 RX ADMIN — ACETYLCYSTEINE 2 ML: 200 SOLUTION ORAL; RESPIRATORY (INHALATION) at 16:30

## 2022-07-12 RX ADMIN — INSULIN ASPART 2 UNITS: 100 INJECTION, SOLUTION INTRAVENOUS; SUBCUTANEOUS at 08:10

## 2022-07-12 RX ADMIN — LEVALBUTEROL HYDROCHLORIDE 1.25 MG: 1.25 SOLUTION RESPIRATORY (INHALATION) at 19:30

## 2022-07-12 RX ADMIN — NYSTATIN: 100000 CREAM TOPICAL at 08:15

## 2022-07-12 RX ADMIN — CALCIUM CARBONATE 600 MG (1,500 MG)-VITAMIN D3 400 UNIT TABLET 1 TABLET: at 18:41

## 2022-07-12 RX ADMIN — ACETYLCYSTEINE 2 ML: 200 SOLUTION ORAL; RESPIRATORY (INHALATION) at 07:57

## 2022-07-12 RX ADMIN — ACETYLCYSTEINE 2 ML: 200 SOLUTION ORAL; RESPIRATORY (INHALATION) at 19:30

## 2022-07-12 RX ADMIN — OXYCODONE HYDROCHLORIDE 5 MG: 5 TABLET ORAL at 02:54

## 2022-07-12 RX ADMIN — INSULIN ASPART 4 UNITS: 100 INJECTION, SOLUTION INTRAVENOUS; SUBCUTANEOUS at 04:39

## 2022-07-12 RX ADMIN — HYDROMORPHONE HYDROCHLORIDE 0.2 MG: 0.2 INJECTION, SOLUTION INTRAMUSCULAR; INTRAVENOUS; SUBCUTANEOUS at 08:14

## 2022-07-12 RX ADMIN — HEPARIN SODIUM 5000 UNITS: 5000 INJECTION, SOLUTION INTRAVENOUS; SUBCUTANEOUS at 21:22

## 2022-07-12 RX ADMIN — MIDODRINE HYDROCHLORIDE 20 MG: 5 TABLET ORAL at 15:57

## 2022-07-12 RX ADMIN — ACETAMINOPHEN 650 MG: 325 TABLET, FILM COATED ORAL at 01:57

## 2022-07-12 RX ADMIN — DEXTROSE MONOHYDRATE 1000 ML: 100 INJECTION, SOLUTION INTRAVENOUS at 08:43

## 2022-07-12 RX ADMIN — THIAMINE HYDROCHLORIDE 100 MG: 100 INJECTION, SOLUTION INTRAMUSCULAR; INTRAVENOUS at 08:11

## 2022-07-12 RX ADMIN — LEVALBUTEROL HYDROCHLORIDE 1.25 MG: 1.25 SOLUTION RESPIRATORY (INHALATION) at 16:30

## 2022-07-12 RX ADMIN — HYDROXYZINE HYDROCHLORIDE 25 MG: 25 TABLET ORAL at 08:36

## 2022-07-12 RX ADMIN — LIDOCAINE HYDROCHLORIDE 6 ML: 40 SOLUTION TOPICAL at 13:00

## 2022-07-12 RX ADMIN — HEPARIN SODIUM 5000 UNITS: 5000 INJECTION, SOLUTION INTRAVENOUS; SUBCUTANEOUS at 05:54

## 2022-07-12 RX ADMIN — MIDODRINE HYDROCHLORIDE 30 MG: 5 TABLET ORAL at 00:36

## 2022-07-12 RX ADMIN — FUROSEMIDE 120 MG: 10 INJECTION, SOLUTION INTRAMUSCULAR; INTRAVENOUS at 09:56

## 2022-07-12 RX ADMIN — INSULIN ASPART 2 UNITS: 100 INJECTION, SOLUTION INTRAVENOUS; SUBCUTANEOUS at 11:38

## 2022-07-12 RX ADMIN — SODIUM BICARBONATE 1300 MG: 650 TABLET ORAL at 21:04

## 2022-07-12 RX ADMIN — MYCOPHENOLATE MOFETIL 1000 MG: 200 POWDER, FOR SUSPENSION ORAL at 21:08

## 2022-07-12 RX ADMIN — ACETAMINOPHEN 975 MG: 325 TABLET, FILM COATED ORAL at 13:48

## 2022-07-12 RX ADMIN — HEPARIN SODIUM 5000 UNITS: 5000 INJECTION, SOLUTION INTRAVENOUS; SUBCUTANEOUS at 13:49

## 2022-07-12 RX ADMIN — LEVALBUTEROL HYDROCHLORIDE 1.25 MG: 1.25 SOLUTION RESPIRATORY (INHALATION) at 07:58

## 2022-07-12 RX ADMIN — LIDOCAINE HYDROCHLORIDE 15 ML: 20 SOLUTION ORAL; TOPICAL at 13:00

## 2022-07-12 RX ADMIN — FUROSEMIDE 120 MG: 10 INJECTION, SOLUTION INTRAMUSCULAR; INTRAVENOUS at 21:11

## 2022-07-12 RX ADMIN — NYSTATIN 1000000 UNITS: 100000 SUSPENSION ORAL at 08:11

## 2022-07-12 RX ADMIN — ONDANSETRON 4 MG: 2 INJECTION INTRAMUSCULAR; INTRAVENOUS at 21:19

## 2022-07-12 RX ADMIN — INSULIN ASPART 4 UNITS: 100 INJECTION, SOLUTION INTRAVENOUS; SUBCUTANEOUS at 00:37

## 2022-07-12 RX ADMIN — INSULIN ASPART 2 UNITS: 100 INJECTION, SOLUTION INTRAVENOUS; SUBCUTANEOUS at 21:01

## 2022-07-12 RX ADMIN — FUROSEMIDE 120 MG: 10 INJECTION, SOLUTION INTRAMUSCULAR; INTRAVENOUS at 15:57

## 2022-07-12 RX ADMIN — LIDOCAINE HYDROCHLORIDE 3 ML: 10 INJECTION, SOLUTION EPIDURAL; INFILTRATION; INTRACAUDAL; PERINEURAL at 12:59

## 2022-07-12 RX ADMIN — ACETYLCYSTEINE 2 ML: 200 SOLUTION ORAL; RESPIRATORY (INHALATION) at 11:41

## 2022-07-12 RX ADMIN — NYSTATIN 1000000 UNITS: 100000 SUSPENSION ORAL at 13:49

## 2022-07-12 RX ADMIN — ACETAMINOPHEN 975 MG: 325 TABLET, FILM COATED ORAL at 21:00

## 2022-07-12 RX ADMIN — SODIUM BICARBONATE 1300 MG: 650 TABLET ORAL at 13:49

## 2022-07-12 RX ADMIN — INSULIN ASPART 3 UNITS: 100 INJECTION, SOLUTION INTRAVENOUS; SUBCUTANEOUS at 15:58

## 2022-07-12 RX ADMIN — VANCOMYCIN HYDROCHLORIDE 125 MG: KIT at 11:26

## 2022-07-12 RX ADMIN — MIDODRINE HYDROCHLORIDE 30 MG: 5 TABLET ORAL at 08:15

## 2022-07-12 RX ADMIN — PREDNISONE 17.5 MG: 2.5 TABLET ORAL at 08:16

## 2022-07-12 RX ADMIN — FENTANYL CITRATE 50 MCG: 50 INJECTION, SOLUTION INTRAMUSCULAR; INTRAVENOUS at 12:59

## 2022-07-12 RX ADMIN — LEVOFLOXACIN 500 MG: 5 INJECTION, SOLUTION INTRAVENOUS at 09:59

## 2022-07-12 RX ADMIN — Medication 1 CAPSULE: at 21:03

## 2022-07-12 RX ADMIN — TOPICAL ANESTHETIC 0.5 ML: 200 SPRAY DENTAL; PERIODONTAL at 13:01

## 2022-07-12 RX ADMIN — PREDNISONE 17.5 MG: 2.5 TABLET ORAL at 18:41

## 2022-07-12 RX ADMIN — MYCOPHENOLATE MOFETIL 1000 MG: 200 POWDER, FOR SUSPENSION ORAL at 08:10

## 2022-07-12 RX ADMIN — OXYCODONE HYDROCHLORIDE 5 MG: 5 TABLET ORAL at 18:41

## 2022-07-12 RX ADMIN — SODIUM BICARBONATE 1300 MG: 650 TABLET ORAL at 09:59

## 2022-07-12 RX ADMIN — VANCOMYCIN HYDROCHLORIDE 125 MG: KIT at 18:42

## 2022-07-12 RX ADMIN — NYSTATIN 1000000 UNITS: 100000 SUSPENSION ORAL at 15:57

## 2022-07-12 ASSESSMENT — ACTIVITIES OF DAILY LIVING (ADL)
ADLS_ACUITY_SCORE: 34

## 2022-07-12 NOTE — PLAN OF CARE
ICU End of Shift Summary. See flowsheets for vital signs and detailed assessment.    Changes this shift: Pt had broncoscopy at 1200. Team is watching the L Pleural chest tube d/t lack of output. Pt was put back on tube feeds at 35mL/hr with q4h free water flushes. CO2 levels on NC 87  and 90. CO2 levels on BiPAP (15/5) 79 and 77. D/t decreased urine output was put on Lasix 120mg q6h. Continues to have abdominal pain but has been managed with scheduled tylenol and one dose of PRN dilaudid. Pt is encouraged to wear BiPAP as much as possible during the day and all night long. Anixety was managed this AM with Atarax 25mg, Precedex ordered but did not start.    Plan: Continue POC and monitor labs. Wear BiPAP at night.    Goal Outcome Evaluation:  Problem: Plan of Care - These are the overarching goals to be used throughout the patient stay.    Goal: Optimal Comfort and Wellbeing  Intervention: Provide Person-Centered Care  Recent Flowsheet Documentation  Taken 7/12/2022 1600 by Elena Perkins, RN  Trust Relationship/Rapport:   care explained   questions answered   questions encouraged   reassurance provided  Taken 7/12/2022 1200 by Elena Perkins, RN  Trust Relationship/Rapport:   care explained   questions answered   questions encouraged   reassurance provided  Taken 7/12/2022 0800 by Elena Perkins, RN  Trust Relationship/Rapport:   care explained   questions answered   questions encouraged   reassurance provided

## 2022-07-12 NOTE — PROGRESS NOTES
"Bronchoscopy Risk Assessment Guidelines      A. Patient symptoms to consider when assessing pulmonary TB risk are:    I. Cough greater than 3 weeks; and fever, hemoptysis, pleuritic chest    pain, weight loss greater than 10 lbs, night sweats, fatigue, infiltrates on    upper lobes or superior segments of lower lobes, cavitation on chest    x-ray.   B. Patient risk factors to consider when assessing pulmonary TB risk are:    I. Exposure to known TB case, foreign-born persons (within 5 years of    arrival to [place]), residence in a crowded setting (correctional facility,     long-term care center, etc.), persons with HIV or immunosuppression.    Patients with symptoms and risk factors should generally be considered \"suspect risk\" and bronchoscopies should be performed in airborne precautions.    This patient has NO KNOWN RISK of Tuberculosis (proceed with bronchoscopy)    Specimens sent: yes  Complications: None  Scope used: #[id number] Adult  Attending Physician: Rema Batista, RT on 7/12/2022 at 2:29 PM  "

## 2022-07-12 NOTE — PLAN OF CARE
ICU End of Shift Summary. See flowsheets for vital signs and detailed assessment.    Changes this shift: A&Ox4, difficult to manage abdominal pain,PRN Oxycodone and Tylenol given with good effect. VSS, maintained MAP> 55. Patient refused BiPap and HFNC, remained on 2L NC. Tube feeds stopped at 0600 per NPO orders for bronchoscopy at noon today; ok to give Mycophenolate, prednisone and midodrine at 8am per order. Low urine output, IAP 3. Continues to have loose stools via rectal tube. C. Diff positive.    Plan: Bronchoscopy at noon today. Monitor urine output and Cr.     Plan of Care Reviewed With: patient, daughter          Outcome Evaluation: VSS. Continues to have abdominal pain.

## 2022-07-12 NOTE — PROGRESS NOTES
Nephrology Progress Note  07/12/2022         Sofie Rodriguez is a 62 yom with hx of HTN, Hep C, osetopenia, previous polysubstance use (stopped 2019) and severe COPD who is s/p BSLT on 6/28/2022.  Had ~2.5h of bypass time, was uncomplicated but now has post op BACILIO in setting of continued need for vasopressors thought to be due to vasoplegia.  Had radial artery thrombus requiring thrombectomy on 7/2.  Nephrology consulted for BACILIO with mild hyperkalemia and oliguric UOP.       Interval History :   Mrs Higgins's Cr has been up and down since surgery and now up again in setting of high vanco level and high tacro level.  No issue pushing for RRT today but Cr continues to slowly rise, will continue to follow closely.       Assessment & Recommendations:   BACILIO-Normal baseline renal fx historically and at time of surgery.  No renal imaging but low suspicion of obstruction so will consider if she does not show improvement.  BACILIO is likely hypoperfusion with ~2.5h of bypass time and tacrolimus although levels have been within range (needing higher doses of tacro).   Will manage this medically for now and continue to monitor.  UA initially showed hyaline casts on 6/28..  Noted that she did not get contrast for thrombectomy on 7/2.  Cr had started to improve but now again on the rise.                -BACILIO, likely hypoperfusion and need for tacro, also now with supratherapeutic vanco.   No pressing need for RRT today but getting closer based on Cr trend.                  -Continue to avoid nephrotoxins, norepi weaned off, would keep SBP >100 as able, hypotensive due to vasoplegia.       Volume-Oliguric UOP but about net even yesterday, giving lasix today to try to augment UOP a bit as CXR suggests some pulm edema.      Electrolytes-K 4.4, bicarb 35, ABG 7.24/87/132/38.  Hypercapnia thought to be related to longstanding COPD and should get better with time.       BMD-Ca up at 8.4, iCal WNL throughout, will follow.  No issues with  "Mg/Phos.       Lung Tx-On Tacro, levels have been within range, last level 4.8.  Has hypercapnia despite transplant and reasonable CXR/oxygenation, team expects this to improve with time.       Anemia-Hgb 7.2, acute management per team.       Nutrition-Started on TF, changed to renal TF     Time spent: 30 minutes on this date of encounter for chart review, physical exam, medical decision making and co-ordination of care.      Seen and discussed with Dr Bauman     Recommendations were communicated to primary team via verbal communication.        RUFUS Cedeño CNS  Clinical Nurse Specialist  [phone]    Review of Systems:   I reviewed the following systems:  Gen: No fevers or chills  CV: No CP at rest  Resp: No SOB at rest  GI: No N/V    Physical Exam:   I/O last 3 completed shifts:  In: 2147 [I.V.:782; NG/GT:560]  Out: 1915 [Urine:285; Stool:450; Chest Tube:1180]   /52 (BP Location: Left arm)   Pulse 79   Temp 97.8  F (36.6  C) (Oral)   Resp 11   Ht 1.575 m (5' 2\")   Wt 74.2 kg (163 lb 9.3 oz)   SpO2 99%   BMI 29.92 kg/m       GENERAL APPEARANCE: On NC, in mild distress.    EYES: No scleral icterus  NECK:  No JVD  Pulmonary: lungs clear to auscultation with equal breath sounds bilaterally, no clubbing or cyanosis  CV: Regular rhythm, normal rate, no rub   - Edema+2 generalized.   GI: soft, nontender, normal bowel sounds  MS: no evidence of inflammation in joints, no muscle tenderness  : + Dong  SKIN: no rash, warm, dry  NEURO: Answering questions appropriately, no focal deficits.      Labs:   All labs reviewed by me  Electrolytes/Renal - Recent Labs   Lab Test 07/12/22  0804 07/12/22  0419 07/12/22  0414 07/11/22  1519 07/11/22  1518 07/11/22  0805 07/11/22  0438 07/10/22  0750 07/10/22  0427   NA  --   --  137  --  138  --  139   < > 138   POTASSIUM  --   --  4.4  --  4.4  --  4.4   < > 4.2   CHLORIDE  --   --  93*  --  94*  --  93*   < > 91*   CO2  --   --  35*  --  34*  --  38*   < > 39* "   BUN  --   --  134.0*  --  127.0*  --  126.0*   < > 114.0*   CR  --   --  3.11*  --  2.89*  --  2.78*   < > 2.39*   * 234* 253*   < > 257*   < > 233*   < > 198*   ESTUARDO  --   --  8.4*  --  8.4*  --  8.4*   < > 8.5*   MAG  --   --  3.1*  --   --   --  2.7*  --  2.9*   PHOS  --   --  5.4*  --   --   --  5.4*  --  5.4*    < > = values in this interval not displayed.       CBC -   Recent Labs   Lab Test 07/12/22  0414 07/11/22  0438 07/10/22  0427   WBC 15.2* 18.4* 17.9*   HGB 7.2* 7.7* 7.9*    287 345       LFTs -   Recent Labs   Lab Test 07/11/22 0438 07/07/22  0323 07/06/22  0337 07/04/22 0323   ALKPHOS 78 63 59 60   BILITOTAL <0.2 <0.2 <0.2 <0.2   ALT 22 24 22 24   AST 16 23 24 20   PROTTOTAL 4.6* 4.6* 4.4* 4.4*   ALBUMIN 2.7* 2.8*  --  2.6*       Iron Panel - No lab results found.        Current Medications:    acetaminophen  975 mg Oral Q8H     acetylcysteine  2 mL Nebulization 4x Daily     aspirin  81 mg Oral Daily     calcium carbonate 600 mg-vitamin D 400 units  1 tablet Oral BID w/meals     furosemide  120 mg Intravenous Once     heparin ANTICOAGULANT  5,000 Units Subcutaneous Q8H TONY     insulin aspart  1-12 Units Subcutaneous Q4H     insulin glargine  15 Units Subcutaneous Once     [START ON 7/13/2022] insulin glargine  30 Units Subcutaneous QAM AC     lactobacillus rhamnosus (GG)  1 capsule Oral BID     levalbuterol  1.25 mg Nebulization 4x Daily     levofloxacin  500 mg Intravenous Q48H     lidocaine  2 patch Transdermal Q24H     lidocaine   Transdermal Q8H TONY     midodrine  20 mg Oral or Feeding Tube Q8H     multivitamins w/minerals  15 mL Per Feeding Tube Daily     mycophenolate  1,000 mg Oral or Feeding Tube BID     nystatin   Topical BID     nystatin  1,000,000 Units Swish & Swallow 4x Daily     pantoprazole  40 mg Oral or Feeding Tube Daily     predniSONE  17.5 mg Oral BID w/meals     protein modular  1 packet Per Feeding Tube Daily     [Held by provider] senna-docusate  2 tablet Oral  BID     sodium bicarbonate  1,300 mg Oral TID     sodium chloride (PF)  3 mL Intracatheter Q8H     [Held by provider] sulfamethoxazole-trimethoprim  1 tablet Oral or Feeding Tube Once per day on Mon Wed Fri     [START ON 7/13/2022] thiamine  100 mg Oral or Feeding Tube Daily     valGANciclovir  450 mg Oral or NG Tube Once per day on Mon Thu     vancomycin  125 mg Oral or Feeding Tube Q6H TONY       dexmedetomidine       dextrose 1,000 mL (07/12/22 0843)     heparin for  units in  mL (1 unit/mL for Interventional Radiology) 3 Units/hr (07/06/22 2016)

## 2022-07-12 NOTE — PROGRESS NOTES
Brief Nephrology Update        Discussed with the surgical team about possible RRT for volume management as UOP is down.  Overall she is about net even the past ~48h as chest tube output is high. We gave 120mg lasix at ~1000 and have seen a response of ~100cc of UOP.  Will repeat this dose for 3x total today to see if we can be a bit net negative, will try to hold off on RRT for today as her BACILIO is likely due to high tacro/vanco levels which may improve as these normalize although at this point would have low threshold to start if an issue arises.      I did try to call pt's son to discuss and obtain consent for line and RRT but he did not answer.  Will try again but it is not emergent at this time.      Madan Leblanc, APRN CNS  Clinical Nurse Specialist  [phone]

## 2022-07-12 NOTE — PROCEDURES
ICU BRONCHOSCOPY    Procedure(s):    Bronchoscopy    Indication:  New lung transplant, inspection     Procedure Details:     The bronchoscope was inserted through the mouth, the cords were anesthetized with lidocaine. Upper airway structures, vocal cords (anatomy and function) appear to be normal. The patient was not intubated.  The tracheobronchial tree was examined to at least the first subsegmental level. The b/l anastomoses were intact (see pictures). Thick secretions were noted in b/l mainstem, R mainstem/RUL plug was removed via therapeutic suctioning.     Pictures:   Elsy:       L anastomosis and thick mucous:       R anastomosis with some tissue ischemia and granulation:     Therapeutic suctioning was performed.  Specimens were sent to the lab.    Any disposable equipment was visually inspected and deemed to be intact immediately post procedure.      Recommendations:     --> Normal and intact anastomoses; small amount of granulation tissue posteriorly in R anastomosis, no stenosis or dehiscence  --> Thick brown secretions in proximal airways, R mainstem/RUL mucous plug removed  --> Airways appear healthy    ==========================================================    Attending of Record:   Dr. Susan Miller    Trainee(s) Present: None    Medications:    3 ml 4% lidocaine  3 ml 1% lidocaine  1 spray(s) hurricaine spray  1 mg versed  50 mcg fentanyl    Sedation Time: Total sedation time was 10 minutes of continuous bedside 1:1 monitoring.     Time Out:  Performed by verifying the correct patient, correct procedure, and ensuring that all equipment / support staff are present.     The patient's medical record has been reviewed.  The indication for the procedure was reviewed.  The necessary history and physical examination was performed and reviewed.  The risks, benefits and alternatives of the procedure were discussed with the the patient in detail and questions were answered to the best of my ability.   Verbal consent was obtained.  Written consent was obtained.  This procedure was not deemed emergent / urgent.  The proposed procedure and the patient's identification were verified prior to the procedure by the physician and the nurse, respiratory therapist.    Bronchoscopy Risk Assessment Guidelines:      A. Patient symptoms to consider when assessing pulmonary TB risk are:    I. Cough greater than 3 weeks; and fever, hemoptysis, pleuritic chest    pain, weight loss greater than 10 lbs, night sweats, fatigue, infiltrates on    upper lobes or superior segments of lower lobes, cavitation on chest    x-ray.   B. Patient risk factors to consider when assessing pulmonary TB risk are:    I. Exposure to known TB case, foreign-born persons (within 5 years of    arrival to ), residence in a crowded setting (correctional facility,     long-term care center, etc.), persons with HIV or immunosuppression.    A Tuberculosis risk assessment was performed:   This patient has NO KNOWN RISK of Tuberculosis (proceed with bronchoscopy)    The procedure was performed in a negative airflow room: Yes    The patient was assessed for the adequacy for the procedure and to receive medications.     Mental Status:  Alert and oriented x 3  Airway examination:  Class III (Visualization of only the base of uvula)  Pulmonary:  Decreased breathsounds in bilateral bases  CV:  RRR, no murmurs or gallops  ASA Grade:  (III)  Severe systemic disease that is not incapacitating    Immediately before administration of medications the patient was re-assessed for adequacy to receive sedatives including the heart rate, respiratory rate, mental status, oxygen saturation, blood pressure and adequacy of pulmonary ventilation. These same parameters were continuously monitored throughout the procedure.

## 2022-07-12 NOTE — PROGRESS NOTES
Pulmonary Medicine  Cystic Fibrosis - Lung Transplant Team  Progress Note  2022       Patient: Sofie Rodriguez  MRN: [id number]  : 1960 (age 62 year old)  Transplant: 2022 (Lung), POD#14  Admission date: 2022    Assessment & Plan:     Sofie Rodriguez is a 62 year old female with a PMH significant for end stage COPD, HTN, HFpEF, Mycobacterium peregrinum colonization, h/o hepatitis C, HECTOR s/p LEEP procedure, osteopenia, and former methamphetamine use.  Pt. is now s/p BSLT on 22.  Surgery on CPB, lungs slightly undersized.   Required moderate volume resuscitation with low dose pressors post-op.  Extubated .  Persistent hypercapnia, limited improvement with BiPAP.  Etiology unclear but appears to be respiratory drive problem.  Prior persistent low dose pressor requirement of unclear etiology, weaned off 7/10, remains on scheduled midodrine.  Post-op course also notable for left radial artery thrombus (presumed secondary to arterial line) s/p thrombectomy , BACILIO, and C diff.     Today's recommendations:  - DSA, EBV, IgG, and donor risk labs ordered   - Trial of BiPAP given persistent hypercapnia  - Surgery team to evaluate left chest tube given significant decline in output  - CXR daily as below  - Repeat inspection bronch today  - Tacrolimus level supratherapeutic although down trending (peak level appropriate yesterday), resume at reduced dose tomorrow, daily level ordered through   - Prednisone taper adjusted as below, ordered   - Continue to hold Bactrim for PJP ppx given BACILIO  - Follow bronch cultures once collected  - Send AFB cultures with all future bronchs  - Continue levofloxacin through today to complete 2 week total ABX course  - C diff positive, PO vancomycin per ICU team     S/p bilateral sequential lung transplant (BSLT) for end stage COPD:  Persistent hypercapneic respiratory failure:   Persistent hypotension:   Bilateral hydroPTX: Explant pathology with  severe emphysema with subpleural bullae formation, changes of chronic bronchitis, subpleural scars and patchy pulmonary edema, benign hilar lymph nodes.  No evidence of PGD post-op.  Bronch (6/29) with minimal mucoid secretions t/o right, left anastomosis with slight erythema, and cloudy mucoid secretions removed from LLL.  Extubated 6/30.  Persistent hypercapnia although patient is not very tachypneic/dyspneic so it appears to be a respiratory drive problem.  Limited improvement with BiPAP, discontinued 7/8.  Diaphragm assessment by US did not show dysfunction (per CVICU team) and metabolic cart study only 6 mins but not supportive of overfeeding.  Chest CT (7/7) with bilateral small hydroPTX, chest wall subcutaneous emphysema, air collection of right chest wall anteriorly associated with pleura on right, and some narrowing of left sided anastomosis.  Persistent low dose pressor requirement, weaned off 7/10, remains on scheduled midodrine (s/p hydrocortisone 7/6-7/9 and fludrocortisone 7/6-7/11).    - DSA one month post-transplant (7/28, ordered)  - Ammonia monitoring twice weekly x3 weeks (screening for hyperammonemia post-lung transplant), ammonia 25 on 7/11  - Nebs: levalbuterol and Mucomyst QID   - Pulmonary toilet with chest physiotherapy QID along with Aerobika and incentive spirometry hourly while awake  - Supplemental oxygen as needed to maintain SpO2 >92%, trial of BiPAP given persistent hypercapnia, ABG monitoring per ICU team  - Chest tubes managed by surgical team, evaluate left chest tube given significant decline in output  - Daily CXR, today with increasing pleural effusions at the apices, possibly loculated and stable perihilar and lower low mixed opacities (personally reviewed)  - Repeat inspection bronch 7/12 due to CXR changes and weak cough     Immunosuppression:  Induction therapy with basiliximab (and high dose IV steroid) given intraoperatively, repeating basiliximab dose on POD#4.  -  Tacrolimus held 7/11 given supratherapeutic level (prior 9 mg BID, peak appropriate 7/11).  Goal level 8-12.  Level 7/12 supratherapeutic at 14.5 although down trending, resume at 5 mg BID on 7/13, repeat level daily through 7/16 (ordered).  - MMF 1000 mg BID (decreased 7/10 due to diarrhea)  - Prednisone 17.5 mg BID (resumed 7/9, held while on hydrocortisone as above), taper due 7/14 (ordered)  Date AM dose (mg) PM dose (mg)   6/30/22 17.5 17.5   7/14/22 15 15   7/21/22 15 12.5   7/28/22 12.5 12.5   8/4/22 12.5 10   8/18/22 10 10   9/15/22 10 7.5   10/13/22 7.5 7.5   11/10/22 7.5 5   12/8/22 5 5   1/5/23 5 2.5      Prophylaxis:   - Bactrim for PJP ppx on hold since 7/7 for BACILIO (G6PD 14.1 on 6/14 if need to consider alternative)  - VGCV for CMV ppx (started 7/7)  - Nystatin for oral candidiasis ppx, 6 month course  - See below for serologies and viral ppx:    Donor Recipient Intervention   CMV status Positive Positive Valganciclovir POD #8-90   EBV status Positive Positive EBV check monthly (7/28, ordered)   HSV status N/A Positive Not indicated      ID: IgG adequate (1,381) at time of transplant.  Prior history of colonization with Mycobacterium peregrinum.  Donor bronch cultures (OSH) with Strep beta hemolytic, not group A.  Febrile to 100.6 (6/30), resolved within 24 hours.  Blood cultures most recently negative 7/4.  - IgG at one month (7/28, ordered)  - Bronch cultures 7/12 once collected  - AFB bronch culture (6/28, 6/29) NGTD, AFB to be sent on all future bronchs     Streptococcus pneumoniae:  Stenotrophomonas maltophilia: Noted in recipient cultures at time of transplant.  S/p ceftazidime 6/28-7/10 and vancomycin 7/7-7/8 and 6/28-6/30.   - ABX: IV levofloxacin (7/10-7/12) for total 2 week ABX course     H/o hepatitis C: Diagnosed in 1980s, 2 mos of treatment, quant negative since 10/2017, last positive 2/20/17 ([phone]).  Glenis positive on 6/2021 with negative HCV PCR.  H/o remote EtOH abuse.  MR elastography  "(4/27/21) with hepatology review and consult without any concerns post transplant.  Hepatitis C RNA negative and hepatitis C antibody positive (old) on 6/28.     PHS risk criteria donor:  Additional labs required post-transplant (between 4-8 weeks post-op): Hepatitis B, Hepatitis C, and HIV by SHERI ([id number], ordered 7/28).     Other relevant problems managed by primary team:     Left hand ischemia: Radial artery thrombosis identified on duplex doppler.  S/p thrombectomy on 7/2.  Completed high intensity heparin.  Continue daily aspirin.     BACILIO:   Hyperkalemia: Likely multifactorial including Bactrim and hypotension.  Tacrolimus now supratherapeutic 7/11 (previously subtherapeutic).  Volume status is unclear.  Hypotension and rising Cr suggest volume depletion but intake has generally been greater than output each day and patient does have some dependent edema.  Nephrology consulted, following.  Fludrocortisone started 7/6 as above and also in setting of hyperkalemia (K+ normal since 7/7).    - Tacrolimus monitoring as above  - Monitor potassium with stopping Florinef as above  - Volume management per nephrology and ICU team, revisit need for dialysis daily per nephrology      Abdominal pain:  Diarrhea:   C diff positive: Patient reports \"crampy\" abdominal pain since at least 7/8.  AXR 7/8 without dilated bowel, moderate colonic stool burden.  Also with diarrhea, rectal tube placed overnight 7/10 to 7/11.  C diff positive 7/11.  S/p Flagyl 7/11.  - PO vancomycin (7/11) per ICU team     History of steroid induced hyperglycemia: Well controlled as OP.  - May need endocrine consult prior to discharge    We appreciate the excellent care provided by the CVICU and CVTS teams.  Recommendations communicated via in person rounding and this note.  Will continue to follow along closely, please do not hesitate to call with any questions or concerns.    Patient seen and discussed with Dr. Miller.    Yuliet Aviles PA-C  Inpatient " "[facility]  Pulmonary CF/Transplant     Subjective & Interval History:     Declined HFNC overnight, on 2L NC.  Ongoing hypercapnia this morning.  Received chest physiotherapy QID.  Left chest tube with decreased output overnight.  Ongoing abdominal pain and cramping with loose stools (rectal tube in place).  BUN and Cr up trending, low urine output.  Afebrile, ongoing leukocytosis although down trending.    Review of Systems:     C: No fever, + decreased weight, + no appetite   INTEGUMENTARY/SKIN: No rash or obvious new lesions  ENT/MOUTH: No nasal drainage  RESP: See interval history  CV: No chest pain, + peripheral edema  GI: + nausea, no vomiting  : + Dong  MUSCULOSKELETAL: No myalgias, no arthralgias  ENDOCRINE: + blood sugars intermittently elevated  NEURO: No headache  PSYCHIATRIC: + anxiety    Physical Exam:     All notes, images, and labs from past 24 hours (at minimum) were reviewed.    Vital signs:  Temp: 97.8  F (36.6  C) Temp src: Oral BP: 109/52 Pulse: 75   Resp: 11 SpO2: 96 % O2 Device: BiPAP/CPAP Oxygen Delivery: 2 LPM Height: 157.5 cm (5' 2\") Weight: 74.2 kg (163 lb 9.3 oz)  I/O:     Intake/Output Summary (Last 24 hours) at 7/12/2022 1121  Last data filed at 7/12/2022 1000  Gross per 24 hour   Intake 1763.92 ml   Output 1515 ml   Net 248.92 ml     Constitutional: Lying in bed, in no apparent distress.   HEENT: Eyes with pink conjunctivae, anicteric.  Oral mucosa moist with mild yellow plaque to tongue.  Nasal FT.  PULM: Diminished air flow bilaterally, >bases.  No crackles, no rhonchi, no wheezes.  Non-labored breathing on 2L NC.  CV: Normal S1 and S2.  RRR.  + systolic murmur.  2+ BLE edema.   ABD: NABS, softly disteded, + mildly tender t/o.    MSK: Moves all extremities.  No apparent muscle wasting.   NEURO: Alert, minimally conversant.   SKIN: Warm, dry.  No rash on limited exam.  Clamshell incision not visualized.   PSYCH: Mood stable, calm.     Lines, Drains, and Devices:  Arterial Line 07/05/22 " Brachial (Active)   Site Assessment WDL 07/12/22 0400   Line Status Pulsatile blood flow 07/12/22 0400   Arterine Line Cap Change Due 07/12/22 07/12/22 0400   Art Line Waveform Appropriate;Square wave test performed 07/12/22 0400   Art Line Interventions Leveled;Connections checked and tightened;Flushed per protocol 07/12/22 0400   Color/Movement/Sensation Capillary refill less than 3 sec 07/12/22 0400   Line Necessity Yes, meets criteria 07/12/22 0400   Dressing Type Transparent 07/12/22 0400   Dressing Status Clean, dry, intact 07/12/22 0400   Dressing Intervention Dressing changed/new dressing 07/09/22 2200   Dressing Change Due 07/17/22 07/12/22 0400   Number of days: 7       CVC Double Lumen Right Internal jugular (Active)   Site Assessment WDL 07/12/22 0400   Dressing Type Chlorhexidine disk;Transparent 07/12/22 0400   Dressing Status clean;dry;intact 07/12/22 0400   Dressing Intervention new dressing 07/09/22 2200   Dressing Change Due 07/17/22 07/12/22 0400   Tubing Change secondary tubing;primary tubing 06/29/22 0400   Line Necessity yes, meets criteria 07/12/22 0400   Brown - Status infusing 07/12/22 0400   Brown - Cap Change Due 07/12/22 07/12/22 0400   White - Status saline locked 07/12/22 0400   White - Cap Change Due 07/12/22 07/12/22 0400   Phlebitis Scale 0-->no symptoms 07/12/22 0400   Infiltration? no 07/12/22 0400   Infiltration Scale 0 07/12/22 0400   Infiltration Site Treatment Method  None 07/12/22 0400   Was a vesicant infusing? no 07/12/22 0400   CVC Comment MAC capped 07/12/22 0400   Number of days: 14       Left Radial Interventional Procedure Access (Active)   Site Assessment WDL 07/12/22 0800   Hemostasis management Unchanged 07/12/22 0800   CMS Left Arm WDL 07/12/22 0800   Radial Pulse - Left Arm Normal 07/12/22 0800   Number of days: 10     Data:     LABS    CMP:   Recent Labs   Lab 07/12/22  0958 07/12/22  0804 07/12/22  0419 07/12/22  0414 07/11/22  1519 07/11/22  1518 07/11/22  0805  07/11/22  0438 07/10/22  2046 07/10/22  1608 07/10/22  0750 07/10/22  0427 07/09/22  0404 07/09/22  0401 07/07/22  0406 07/07/22  0323 07/06/22  0405 07/06/22  0337   NA  --   --   --  137  --  138  --  139  --  137  --  138   < > 143   < > 136   < > 132*   POTASSIUM  --   --   --  4.4  --  4.4  --  4.4  --  4.1  --  4.2   < > 4.5   < > 4.5   < > 5.2  5.5*   CHLORIDE  --   --   --  93*  --  94*  --  93*  --  91*  --  91*   < > 96*   < > 90*   < > 98   CO2  --   --   --  35*  --  34*  --  38*  --  40*  --  39*   < > 39*   < > 32*   < > 28   ANIONGAP  --   --   --  9  --  10  --  8  --  6*  --  8   < > 8   < > 14   < > 6*   * 166* 234* 253*   < > 257*   < > 233*   < > 275*   < > 198*   < > 125*   < > 183*   < > 244*   BUN  --   --   --  134.0*  --  127.0*  --  126.0*  --  121.0*  --  114.0*   < > 104.0*   < > 94.8*   < > 82.7*   CR  --   --   --  3.11*  --  2.89*  --  2.78*  --  2.53*  --  2.39*   < > 1.96*   < > 1.99*   < > 1.98*   GFRESTIMATED  --   --   --  16*  --  18*  --  19*  --  21*  --  23*   < > 29*   < > 28*   < > 28*   ESTUARDO  --   --   --  8.4*  --  8.4*  --  8.4*  --  8.4*  --  8.5*   < > 8.4*   < > 8.1*   < > 8.0*   MAG  --   --   --  3.1*  --   --   --  2.7*  --   --   --  2.9*  --  2.5*   < > 2.6*  --  2.9*   PHOS  --   --   --  5.4*  --   --   --  5.4*  --   --   --  5.4*  --  6.2*   < > 5.6*  --  3.5   PROTTOTAL  --   --   --   --   --   --   --  4.6*  --   --   --   --   --   --   --  4.6*  --  4.4*   ALBUMIN  --   --   --   --   --   --   --  2.7*  --   --   --   --   --   --   --  2.8*  --   --    BILITOTAL  --   --   --   --   --   --   --  <0.2  --   --   --   --   --   --   --  <0.2  --  <0.2   ALKPHOS  --   --   --   --   --   --   --  78  --   --   --   --   --   --   --  63  --  59   AST  --   --   --   --   --   --   --  16  --   --   --   --   --   --   --  23  --  24   ALT  --   --   --   --   --   --   --  22  --   --   --   --   --   --   --  24  --  22    < > = values in this  interval not displayed.     CBC:   Recent Labs   Lab 07/12/22  0414 07/11/22  0438 07/10/22  0427 07/09/22  1728   WBC 15.2* 18.4* 17.9* 19.1*   RBC 2.31* 2.47* 2.56* 2.68*   HGB 7.2* 7.7* 7.9* 8.4*   HCT 24.3* 25.5* 26.0* 27.2*   * 103* 102* 102*   MCH 31.2 31.2 30.9 31.3   MCHC 29.6* 30.2* 30.4* 30.9*   RDW 16.3* 16.5* 16.1* 16.3*    287 345 339       INR: No lab results found in last 7 days.    Glucose:   Recent Labs   Lab 07/12/22  0958 07/12/22  0804 07/12/22  0419 07/12/22  0414 07/12/22  0028 07/11/22 2013   * 166* 234* 253* 266* 231*       Blood Gas:   Recent Labs   Lab 07/12/22  0414 07/11/22  0612 07/10/22  0640 07/05/22  1435 07/05/22  1207   PHV  --   --   --   --  7.20*   PCO2V  --   --   --   --  81*   PO2V  --   --   --   --  47   HCO3V  --   --   --   --  32*   LINDY  --   --   --   --  3.1*   O2PER 28 21 24   < > 4    < > = values in this interval not displayed.       Culture Data No results for input(s): CULT in the last 168 hours.    Virology Data:   Lab Results   Component Value Date    FLUAH1 Not Detected 06/29/2022    FLUAH3 Not Detected 06/29/2022    [id number] Not Detected 06/29/2022    IFLUB Not Detected 06/29/2022    RSVA Not Detected 06/29/2022    RSVB Not Detected 06/29/2022    PIV1 Not Detected 06/29/2022    PIV2 Not Detected 06/29/2022    PIV3 Not Detected 06/29/2022    HMPV Not Detected 06/29/2022       Historical CMV results (last 3 of prior testing):  No results found for: CMVQNT  No results found for: CMVLOG    Urine Studies    Recent Labs   Lab Test 07/08/22  0831 07/05/22  1004   URINEPH 5.5 5.5   NITRITE Negative Negative   LEUKEST Negative Negative   WBCU 1 5       Most Recent [facility] Pulmonary Function Testing (FVC/FEV1 only)  FVC-Pre   Date Value Ref Range Status   04/29/2022 1.82 L    11/11/2021 2.17 L    06/14/2021 2.00 L      FVC-%Pred-Pre   Date Value Ref Range Status   04/29/2022 58 %    11/11/2021 70 %    06/14/2021 64 %      FEV1-Pre   Date Value Ref  Range Status   04/29/2022 0.51 L    11/11/2021 0.53 L    06/14/2021 0.54 L      FEV1-%Pred-Pre   Date Value Ref Range Status   04/29/2022 20 %    11/11/2021 21 %    06/14/2021 21 %        IMAGING    Recent Results (from the past 48 hour(s))   XR Chest Port 1 View    Narrative    Chest one view portable spine    HISTORY: Chest tubes and right hydropneumothorax status post bilateral  lung transplant    COMPARISON STUDY: 7/9/2022    FINDINGS: Cardiac silhouette is nonenlarged. Surgical changes  bilateral lung transplant. Right IJ sheath. Feeding tube collimated  off film in the abdomen. Bilateral chest tubes and mediastinal drain.  Small pleural effusions tracking the apices. Left basilar atelectasis  and consolidation. Right axillary PICC. Tiny bibasilar pneumothoraces.      Impression    IMPRESSION: Tiny bibasilar pneumothoraces. Interstitial opacities  bilaterally compatible with pulmonary edema. Left pleural effusion  with basilar atelectasis and consolidation.    JOHANN MORAES MD         SYSTEM ID:  [id number]   XR Chest Port 1 View    Narrative    EXAM: XR CHEST PORT 1 VIEW  7/11/2022 5:40 AM     HISTORY:  chest tubes and R hydropneumothorax s/p bilateral lung  transplant       COMPARISON:  7/10/2022    FINDINGS: Single view of the chest. Postsurgical changes of the chest  with intact clam shell sternotomy wires. Enteric tube courses below  the diaphragm and beyond the field-of-view. Right IJ central venous  catheter tip projects over the SVC. Mediastinal drain and bibasilar  chest tubes.     Stable cardiomediastinal silhouette. Small bilateral pleural  effusions. Resolved right basilar hydropneumothorax. Improving left  basilar pneumothorax. Increased perihilar and bibasilar interstitial  and airspace opacities.       Impression    IMPRESSION:   1. Resolved right and improving left basilar pneumothorax.  2. Mildly increased perihilar and bibasilar opacities likely  representing pulmonary edema, atelectasis and/or  infection.  3. Small pleural effusions, similar to prior.    I have personally reviewed the examination and initial interpretation  and I agree with the findings.    GABRIELLA SNELL MD         SYSTEM ID:  [id number]   XR Chest Port 1 View    Narrative    Exam: XR CHEST PORT 1 VIEW, 7/12/2022 5:50 AM    Indication: chest tubes s/p bilateral lung transplant    Comparison: 7/11/2022    Findings:   Feeding tube is seen coursing through the mediastinum. Tip projects  off the film but is at least to the stomach. Mediastinal drain and  right basilar chest tube is unchanged. Left basilar chest tube in  similar position. Right IJ sheath tip at the confluence of the  innominate veins. Right arm midline in the axillary vein.     Increasing subpleural opacities at the apices. Continued perihilar and  lower lobe mixed opacities. Stable cardiomegaly.      Impression    Impression:   1. Increasing pleural effusions at the apices, possibly loculated.  2. Stable support devices.  3. Stable perihilar and lower lobe mixed opacities suggesting edema.    GABRIELLA SNELL MD         SYSTEM ID:  [id number]

## 2022-07-12 NOTE — PROGRESS NOTES
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 Sunshine. This was complicated by left upper extremity acute limb ischemia s/p left radial thrombectomy on 7/1/22. She remains critically ill with worsening renal injury.      Plan Summary 7/12:  - Precedex gtt for anxiety  - Lantus to 30U daily  - Midodrine 20mg q8h  - IV Levaquin ends today  (14 day course total)   - Lasix 120mg x1 today   - Discuss w/ renal re: Bicarb today  - Remove arterial line after bronchoscopy   - Hgb, BMP noon         PLAN:  Neuro/ pain/ sedation:  Acute postoperative pain  - Monitor neurological status. Notify the MD for any acute changes in exam.  - Scheduled: acetaminophen, robaxin. PRN: oxycodone  - Anxiety: Precedex gtt; atarax PO when able post bronch       Pulmonary:  #Postoperative ventilatory support  #Hypercarbic respiratory failure, improving   #Bilateral Lung Transplant  #Hx COPD  - Titrate FiO2 for SpO2 >92%  - Pulmonary hygiene: Incentive spirometer w/ flutter valve every 15- 30 minutes when awake  - Basiliximab POD #0 and #4, prednisolone 17.5mg BID, mycophenolate 1000mg BID, tacrolimus   - Trend tacrolimus level, held 2/2 high level  - Valganciclovir  - Following ABG, daily   - Follow-up with nephrology re: bicarb today  - HFNC for nighttime, as tolerated     - Bronchoscopy 7/12, to be coordinated by pulmonology      Cardiovascular:  #Hx HTN  #Hx HFpEF  #Distributive shock; improving  - Off norepinephrine gtt 7/10  - Midodrine 20 mg q8h   - Discontinue fludrocortisone  - Monitor hemodynamic status.   - Goal MAP >55     GI care/ Nutrition:   # Abdominal Pain  #C.Diff, positive 7/11   - Recent  KUB negative other than stool burden; frequent loose stools  - Continue probiotic, simethacone  - NJ TF @ goal, tolerating PO  intake   - PPI: protonix  - Hold bowel regimen: miralax; PRN moM  - Vancomycin 125mg PO QID   - ABD CX today for abd pain     Renal/ Fluid Balance/ Electrolytes:   # Acute kidney injury 2/2 ischemic ATN  - Strict I/O, daily weights  - Avoid/limit nephrotoxins as able  - Replete lytes PRN per protocol  - Nephrology following    - Urine sodium   - follow-up recs  - Discontinue 0.1 mg fludrocortisone (7/6-7/11)   - Lasix 120mg x1 today   - Discuss bicarb tab with nephrology      Endocrine:    # Stress induced hyperglycemia  - High sliding scale insulin 7/2  - Lantus 30U daily     ID/ Antibiotics:  # Stress-induced leukocytosis  # Stenotrophomonas Maltophilia   - Continue to monitor fever curve, WBC and inflammatory markers as appropriate  - Prophylaxis: POD #8-90 nystatin, TMP/SMX (discontined due to BACILIO), valganciclovir  - Post-op abx: ceftazadime extended to 14 day coverage given elevated temp and WBCs  - Post-op cultures:   - Gram stain (1+ G+ cocci)   - Resp aerobic cx (4+ G- bacilli)   - Fungal cx (NGTD)   - AFB cx (NGTD)  - Discontinued Ceftazidime (stenotrophomonas) and Vancomycin for strep pneumo (7/7-7/9)  - Discontinue Levaquin 500mg q48h (renal dosing), 14 days course total for abx coverage, end dates for tonight at midnight        Heme:     #Acute blood loss anemia  #Left upper extremity acute limb ischemia s/p left radial thrombectomy on 7/1/22  - Transitioned from heparin gtt to SQH per vascular surgery  - Daily CBC  - Hgb 7.7 7/11        Prophylaxis:    - DVT: mechanical +SQH  - PPI   - PT/OT     Lines/ tubes/ drains:  - CTs x5, RIJ, PIV x2, a-line (remove after bronch today)  - 4 Pleural, 1 med     Disposition:  - CVICU    Patient seen, findings and plan discussed with CV ICU staff, Dr. Leon.    Dominik Sesay MD  Anesthesiology, PGY3  [facility] CV ICU     ====================================    SUBJECTIVE:   NAEON. Worsening renal function. Continued abdominal pain. Stool output decreased.     OBJECTIVE:    1. VITAL SIGNS:   Temp:  [97.4  F (36.3  C)-97.9  F (36.6  C)] 97.4  F (36.3  C)  Pulse:  [75-98] 78  Resp:  [7-16] 10  BP: (109)/(52) 109/52  MAP:  [53 mmHg-84 mmHg] 58 mmHg  Arterial Line BP: ()/(33-53) 93/38  FiO2 (%):  [30 %] 30 %  SpO2:  [92 %-98 %] 98 %  FiO2 (%): 30 %  Resp: 10      2. INTAKE/ OUTPUT:   I/O last 3 completed shifts:  In: 2457 [I.V.:1102; NG/GT:585]  Out: 2168 [Urine:278; Stool:700; Chest Tube:1190]    3. PHYSICAL EXAMINATION:   General: spine in bed, follows commands  Neuro: grossly intact, A&Ox3, conversational, appropriate  Resp: on 1L NC  CV: RRR   Abdomen: Soft, mild tenderness on  palpation, non-peritonitic   Incisions: c/d/i  Extremities: warm and well perfused    4. INVESTIGATIONS:   Arterial Blood Gases   Recent Labs   Lab 07/12/22  0414 07/11/22  0612 07/10/22  0640 07/09/22  0401   PH 7.24* 7.30* 7.39 7.29*   PCO2 87* 84* 75* 85*   PO2 132* 173* 132* 100   HCO3 38* 41* 45* 41*     Complete Blood Count   Recent Labs   Lab 07/12/22  0414 07/11/22  0438 07/10/22  0427 07/09/22  1728   WBC 15.2* 18.4* 17.9* 19.1*   HGB 7.2* 7.7* 7.9* 8.4*    287 345 339     Basic Metabolic Panel  Recent Labs   Lab 07/12/22  0419 07/12/22  0414 07/12/22  0028 07/11/22 2013 07/11/22  1519 07/11/22  1518 07/11/22  0805 07/11/22  0438 07/10/22  2046 07/10/22  1608   NA  --  137  --   --   --  138  --  139  --  137   POTASSIUM  --  4.4  --   --   --  4.4  --  4.4  --  4.1   CHLORIDE  --  93*  --   --   --  94*  --  93*  --  91*   CO2  --  35*  --   --   --  34*  --  38*  --  40*   BUN  --  134.0*  --   --   --  127.0*  --  126.0*  --  121.0*   CR  --  3.11*  --   --   --  2.89*  --  2.78*  --  2.53*   * 253* 266* 231*   < > 257*   < > 233*   < > 275*    < > = values in this interval not displayed.     Liver Function Tests  Recent Labs   Lab 07/11/22  0438 07/07/22  0323 07/06/22  0337   AST 16 23 24   ALT 22 24 22   ALKPHOS 78 63 59   BILITOTAL <0.2 <0.2 <0.2   ALBUMIN 2.7* 2.8*  --       Pancreatic Enzymes  Recent Labs   Lab 07/08/22  0332   LIPASE 17     Coagulation Profile  No lab results found in last 7 days.      5. RADIOLOGY:   No results found for this or any previous visit (from the past 24 hour(s)).    =========================================

## 2022-07-13 ENCOUNTER — APPOINTMENT (OUTPATIENT)
Dept: GENERAL RADIOLOGY | Facility: CLINIC | Age: 62
DRG: 007 | End: 2022-07-13
Attending: THORACIC SURGERY (CARDIOTHORACIC VASCULAR SURGERY)
Payer: MEDICARE

## 2022-07-13 LAB
ANION GAP SERPL CALCULATED.3IONS-SCNC: 6 MMOL/L (ref 7–15)
ANION GAP SERPL CALCULATED.3IONS-SCNC: 8 MMOL/L (ref 7–15)
BASE EXCESS BLDA CALC-SCNC: 16.7 MMOL/L (ref -9–1.8)
BASE EXCESS BLDA CALC-SCNC: 18.4 MMOL/L (ref -9–1.8)
BASE EXCESS BLDA CALC-SCNC: 18.5 MMOL/L (ref -9–1.8)
BASE EXCESS BLDV CALC-SCNC: 18.2 MMOL/L (ref -7.7–1.9)
BASOPHILS # BLD AUTO: 0 10E3/UL (ref 0–0.2)
BASOPHILS NFR BLD AUTO: 0 %
BUN SERPL-MCNC: 150 MG/DL (ref 8–23)
BUN SERPL-MCNC: 153 MG/DL (ref 8–23)
CALCIUM SERPL-MCNC: 8.4 MG/DL (ref 8.8–10.2)
CALCIUM SERPL-MCNC: 8.6 MG/DL (ref 8.8–10.2)
CHLORIDE SERPL-SCNC: 89 MMOL/L (ref 98–107)
CHLORIDE SERPL-SCNC: 90 MMOL/L (ref 98–107)
CREAT SERPL-MCNC: 3.18 MG/DL (ref 0.51–0.95)
CREAT SERPL-MCNC: 3.23 MG/DL (ref 0.51–0.95)
DEPRECATED HCO3 PLAS-SCNC: 42 MMOL/L (ref 22–29)
DEPRECATED HCO3 PLAS-SCNC: 42 MMOL/L (ref 22–29)
EOSINOPHIL # BLD AUTO: 0 10E3/UL (ref 0–0.7)
EOSINOPHIL NFR BLD AUTO: 0 %
ERYTHROCYTE [DISTWIDTH] IN BLOOD BY AUTOMATED COUNT: 16.3 % (ref 10–15)
GFR SERPL CREATININE-BSD FRML MDRD: 16 ML/MIN/1.73M2
GFR SERPL CREATININE-BSD FRML MDRD: 16 ML/MIN/1.73M2
GLUCOSE BLDC GLUCOMTR-MCNC: 161 MG/DL (ref 70–99)
GLUCOSE BLDC GLUCOMTR-MCNC: 182 MG/DL (ref 70–99)
GLUCOSE BLDC GLUCOMTR-MCNC: 191 MG/DL (ref 70–99)
GLUCOSE BLDC GLUCOMTR-MCNC: 192 MG/DL (ref 70–99)
GLUCOSE BLDC GLUCOMTR-MCNC: 232 MG/DL (ref 70–99)
GLUCOSE BLDC GLUCOMTR-MCNC: 240 MG/DL (ref 70–99)
GLUCOSE SERPL-MCNC: 192 MG/DL (ref 70–99)
GLUCOSE SERPL-MCNC: 239 MG/DL (ref 70–99)
HCO3 BLD-SCNC: 44 MMOL/L (ref 21–28)
HCO3 BLD-SCNC: 46 MMOL/L (ref 21–28)
HCO3 BLD-SCNC: 48 MMOL/L (ref 21–28)
HCO3 BLDV-SCNC: 46 MMOL/L (ref 21–28)
HCT VFR BLD AUTO: 23.7 % (ref 35–47)
HGB BLD-MCNC: 7.1 G/DL (ref 11.7–15.7)
IMM GRANULOCYTES # BLD: 0.4 10E3/UL
IMM GRANULOCYTES NFR BLD: 4 %
LACTATE SERPL-SCNC: 0.8 MMOL/L (ref 0.7–2)
LYMPHOCYTES # BLD AUTO: 0.2 10E3/UL (ref 0.8–5.3)
LYMPHOCYTES NFR BLD AUTO: 2 %
MAGNESIUM SERPL-MCNC: 2.8 MG/DL (ref 1.7–2.3)
MCH RBC QN AUTO: 30.7 PG (ref 26.5–33)
MCHC RBC AUTO-ENTMCNC: 30 G/DL (ref 31.5–36.5)
MCV RBC AUTO: 103 FL (ref 78–100)
MONOCYTES # BLD AUTO: 0.4 10E3/UL (ref 0–1.3)
MONOCYTES NFR BLD AUTO: 4 %
NEUTROPHILS # BLD AUTO: 9.3 10E3/UL (ref 1.6–8.3)
NEUTROPHILS NFR BLD AUTO: 90 %
NRBC # BLD AUTO: 0 10E3/UL
NRBC BLD AUTO-RTO: 0 /100
O2/TOTAL GAS SETTING VFR VENT: 1 %
O2/TOTAL GAS SETTING VFR VENT: 30 %
OXYHGB MFR BLD: 96 % (ref 92–100)
PCO2 BLD: 78 MM HG (ref 35–45)
PCO2 BLD: 79 MM HG (ref 35–45)
PCO2 BLD: 87 MM HG (ref 35–45)
PCO2 BLDV: 85 MM HG (ref 40–50)
PH BLD: 7.35 [PH] (ref 7.35–7.45)
PH BLD: 7.36 [PH] (ref 7.35–7.45)
PH BLD: 7.38 [PH] (ref 7.35–7.45)
PH BLDV: 7.34 [PH] (ref 7.32–7.43)
PHOSPHATE SERPL-MCNC: 5 MG/DL (ref 2.5–4.5)
PLATELET # BLD AUTO: 255 10E3/UL (ref 150–450)
PO2 BLD: 107 MM HG (ref 80–105)
PO2 BLD: 107 MM HG (ref 80–105)
PO2 BLD: 75 MM HG (ref 80–105)
PO2 BLDV: 42 MM HG (ref 25–47)
POTASSIUM SERPL-SCNC: 4.3 MMOL/L (ref 3.4–5.3)
POTASSIUM SERPL-SCNC: 4.5 MMOL/L (ref 3.4–5.3)
RBC # BLD AUTO: 2.31 10E6/UL (ref 3.8–5.2)
SODIUM SERPL-SCNC: 138 MMOL/L (ref 136–145)
SODIUM SERPL-SCNC: 139 MMOL/L (ref 136–145)
TACROLIMUS BLD-MCNC: 7.9 UG/L (ref 5–15)
TME LAST DOSE: NORMAL H
TME LAST DOSE: NORMAL H
WBC # BLD AUTO: 10.4 10E3/UL (ref 4–11)

## 2022-07-13 PROCEDURE — 99233 SBSQ HOSP IP/OBS HIGH 50: CPT | Mod: 24 | Performed by: PHYSICIAN ASSISTANT

## 2022-07-13 PROCEDURE — 250N000009 HC RX 250: Performed by: SURGERY

## 2022-07-13 PROCEDURE — 250N000013 HC RX MED GY IP 250 OP 250 PS 637: Performed by: SURGERY

## 2022-07-13 PROCEDURE — 94668 MNPJ CHEST WALL SBSQ: CPT

## 2022-07-13 PROCEDURE — 99291 CRITICAL CARE FIRST HOUR: CPT | Mod: 24 | Performed by: STUDENT IN AN ORGANIZED HEALTH CARE EDUCATION/TRAINING PROGRAM

## 2022-07-13 PROCEDURE — 99207 PR NO BILLABLE SERVICE THIS VISIT: CPT | Performed by: PHYSICIAN ASSISTANT

## 2022-07-13 PROCEDURE — 94640 AIRWAY INHALATION TREATMENT: CPT

## 2022-07-13 PROCEDURE — 250N000013 HC RX MED GY IP 250 OP 250 PS 637

## 2022-07-13 PROCEDURE — 250N000012 HC RX MED GY IP 250 OP 636 PS 637: Performed by: PHYSICIAN ASSISTANT

## 2022-07-13 PROCEDURE — 71045 X-RAY EXAM CHEST 1 VIEW: CPT | Mod: 26 | Performed by: RADIOLOGY

## 2022-07-13 PROCEDURE — 99233 SBSQ HOSP IP/OBS HIGH 50: CPT | Mod: 24 | Performed by: CLINICAL NURSE SPECIALIST

## 2022-07-13 PROCEDURE — 80048 BASIC METABOLIC PNL TOTAL CA: CPT | Performed by: STUDENT IN AN ORGANIZED HEALTH CARE EDUCATION/TRAINING PROGRAM

## 2022-07-13 PROCEDURE — 82310 ASSAY OF CALCIUM: CPT | Performed by: STUDENT IN AN ORGANIZED HEALTH CARE EDUCATION/TRAINING PROGRAM

## 2022-07-13 PROCEDURE — 250N000012 HC RX MED GY IP 250 OP 636 PS 637: Performed by: THORACIC SURGERY (CARDIOTHORACIC VASCULAR SURGERY)

## 2022-07-13 PROCEDURE — 250N000013 HC RX MED GY IP 250 OP 250 PS 637: Performed by: CLINICAL NURSE SPECIALIST

## 2022-07-13 PROCEDURE — 200N000002 HC R&B ICU UMMC

## 2022-07-13 PROCEDURE — 82805 BLOOD GASES W/O2 SATURATION: CPT | Performed by: STUDENT IN AN ORGANIZED HEALTH CARE EDUCATION/TRAINING PROGRAM

## 2022-07-13 PROCEDURE — 94640 AIRWAY INHALATION TREATMENT: CPT | Mod: 76

## 2022-07-13 PROCEDURE — 999N000157 HC STATISTIC RCP TIME EA 10 MIN

## 2022-07-13 PROCEDURE — 71045 X-RAY EXAM CHEST 1 VIEW: CPT

## 2022-07-13 PROCEDURE — 36600 WITHDRAWAL OF ARTERIAL BLOOD: CPT

## 2022-07-13 PROCEDURE — 82803 BLOOD GASES ANY COMBINATION: CPT

## 2022-07-13 PROCEDURE — 85025 COMPLETE CBC W/AUTO DIFF WBC: CPT | Performed by: SURGERY

## 2022-07-13 PROCEDURE — 83735 ASSAY OF MAGNESIUM: CPT | Performed by: SURGERY

## 2022-07-13 PROCEDURE — 83605 ASSAY OF LACTIC ACID: CPT | Performed by: ANESTHESIOLOGY

## 2022-07-13 PROCEDURE — 250N000013 HC RX MED GY IP 250 OP 250 PS 637: Performed by: THORACIC SURGERY (CARDIOTHORACIC VASCULAR SURGERY)

## 2022-07-13 PROCEDURE — 250N000011 HC RX IP 250 OP 636: Performed by: SURGERY

## 2022-07-13 PROCEDURE — 250N000013 HC RX MED GY IP 250 OP 250 PS 637: Performed by: STUDENT IN AN ORGANIZED HEALTH CARE EDUCATION/TRAINING PROGRAM

## 2022-07-13 PROCEDURE — 94660 CPAP INITIATION&MGMT: CPT

## 2022-07-13 PROCEDURE — 250N000011 HC RX IP 250 OP 636

## 2022-07-13 PROCEDURE — 80197 ASSAY OF TACROLIMUS: CPT | Performed by: PHYSICIAN ASSISTANT

## 2022-07-13 PROCEDURE — 84100 ASSAY OF PHOSPHORUS: CPT | Performed by: SURGERY

## 2022-07-13 RX ORDER — LIDOCAINE 4 G/G
2 PATCH TOPICAL
Status: DISCONTINUED | OUTPATIENT
Start: 2022-07-13 | End: 2022-08-15

## 2022-07-13 RX ORDER — MULTIVITAMIN,THERAPEUTIC
1 TABLET ORAL DAILY
Status: DISCONTINUED | OUTPATIENT
Start: 2022-07-14 | End: 2022-08-17 | Stop reason: HOSPADM

## 2022-07-13 RX ADMIN — OXYCODONE HYDROCHLORIDE 5 MG: 5 TABLET ORAL at 00:48

## 2022-07-13 RX ADMIN — LEVALBUTEROL HYDROCHLORIDE 1.25 MG: 1.25 SOLUTION RESPIRATORY (INHALATION) at 11:24

## 2022-07-13 RX ADMIN — Medication 1 PACKET: at 09:08

## 2022-07-13 RX ADMIN — Medication 40 MG: at 09:08

## 2022-07-13 RX ADMIN — MIDODRINE HYDROCHLORIDE 20 MG: 5 TABLET ORAL at 09:05

## 2022-07-13 RX ADMIN — TACROLIMUS 5 MG: 5 CAPSULE ORAL at 17:48

## 2022-07-13 RX ADMIN — ACETAMINOPHEN 975 MG: 325 TABLET, FILM COATED ORAL at 04:35

## 2022-07-13 RX ADMIN — INSULIN ASPART 5 UNITS: 100 INJECTION, SOLUTION INTRAVENOUS; SUBCUTANEOUS at 04:45

## 2022-07-13 RX ADMIN — ACETYLCYSTEINE 2 ML: 200 SOLUTION ORAL; RESPIRATORY (INHALATION) at 19:54

## 2022-07-13 RX ADMIN — NYSTATIN 1000000 UNITS: 100000 SUSPENSION ORAL at 13:04

## 2022-07-13 RX ADMIN — ACETYLCYSTEINE 2 ML: 200 SOLUTION ORAL; RESPIRATORY (INHALATION) at 15:39

## 2022-07-13 RX ADMIN — Medication 1 CAPSULE: at 09:06

## 2022-07-13 RX ADMIN — NYSTATIN 1000000 UNITS: 100000 SUSPENSION ORAL at 16:41

## 2022-07-13 RX ADMIN — CALCIUM CARBONATE 600 MG (1,500 MG)-VITAMIN D3 400 UNIT TABLET 1 TABLET: at 09:05

## 2022-07-13 RX ADMIN — PREDNISONE 17.5 MG: 2.5 TABLET ORAL at 17:47

## 2022-07-13 RX ADMIN — PREDNISONE 17.5 MG: 2.5 TABLET ORAL at 09:04

## 2022-07-13 RX ADMIN — CALCIUM CARBONATE 600 MG (1,500 MG)-VITAMIN D3 400 UNIT TABLET 1 TABLET: at 17:47

## 2022-07-13 RX ADMIN — HYDROXYZINE HYDROCHLORIDE 50 MG: 25 TABLET ORAL at 21:37

## 2022-07-13 RX ADMIN — VANCOMYCIN HYDROCHLORIDE 125 MG: KIT at 13:11

## 2022-07-13 RX ADMIN — HEPARIN SODIUM 5000 UNITS: 5000 INJECTION, SOLUTION INTRAVENOUS; SUBCUTANEOUS at 21:49

## 2022-07-13 RX ADMIN — VANCOMYCIN HYDROCHLORIDE 125 MG: KIT at 06:48

## 2022-07-13 RX ADMIN — MYCOPHENOLATE MOFETIL 1000 MG: 200 POWDER, FOR SUSPENSION ORAL at 09:07

## 2022-07-13 RX ADMIN — NYSTATIN: 100000 CREAM TOPICAL at 21:49

## 2022-07-13 RX ADMIN — ACETYLCYSTEINE 2 ML: 200 SOLUTION ORAL; RESPIRATORY (INHALATION) at 11:24

## 2022-07-13 RX ADMIN — SODIUM BICARBONATE 1300 MG: 650 TABLET ORAL at 09:06

## 2022-07-13 RX ADMIN — HEPARIN SODIUM 5000 UNITS: 5000 INJECTION, SOLUTION INTRAVENOUS; SUBCUTANEOUS at 14:02

## 2022-07-13 RX ADMIN — MIDODRINE HYDROCHLORIDE 20 MG: 5 TABLET ORAL at 16:40

## 2022-07-13 RX ADMIN — INSULIN ASPART 1 UNITS: 100 INJECTION, SOLUTION INTRAVENOUS; SUBCUTANEOUS at 09:18

## 2022-07-13 RX ADMIN — INSULIN ASPART 3 UNITS: 100 INJECTION, SOLUTION INTRAVENOUS; SUBCUTANEOUS at 21:38

## 2022-07-13 RX ADMIN — VANCOMYCIN HYDROCHLORIDE 125 MG: KIT at 00:49

## 2022-07-13 RX ADMIN — LEVALBUTEROL HYDROCHLORIDE 1.25 MG: 1.25 SOLUTION RESPIRATORY (INHALATION) at 15:39

## 2022-07-13 RX ADMIN — INSULIN ASPART 2 UNITS: 100 INJECTION, SOLUTION INTRAVENOUS; SUBCUTANEOUS at 13:09

## 2022-07-13 RX ADMIN — HYDROXYZINE HYDROCHLORIDE 50 MG: 25 TABLET ORAL at 00:47

## 2022-07-13 RX ADMIN — ACETAMINOPHEN 975 MG: 325 TABLET, FILM COATED ORAL at 13:04

## 2022-07-13 RX ADMIN — INSULIN ASPART 4 UNITS: 100 INJECTION, SOLUTION INTRAVENOUS; SUBCUTANEOUS at 01:01

## 2022-07-13 RX ADMIN — ONDANSETRON 4 MG: 2 INJECTION INTRAMUSCULAR; INTRAVENOUS at 08:52

## 2022-07-13 RX ADMIN — MULTIVITAMIN 15 ML: LIQUID ORAL at 09:05

## 2022-07-13 RX ADMIN — VANCOMYCIN HYDROCHLORIDE 125 MG: KIT at 17:48

## 2022-07-13 RX ADMIN — ASPIRIN 81 MG CHEWABLE TABLET 81 MG: 81 TABLET CHEWABLE at 09:05

## 2022-07-13 RX ADMIN — ACETAMINOPHEN 975 MG: 325 TABLET, FILM COATED ORAL at 21:36

## 2022-07-13 RX ADMIN — TACROLIMUS 5 MG: 5 CAPSULE ORAL at 09:07

## 2022-07-13 RX ADMIN — SODIUM BICARBONATE 1300 MG: 650 TABLET ORAL at 21:38

## 2022-07-13 RX ADMIN — PROCHLORPERAZINE EDISYLATE 10 MG: 5 INJECTION INTRAMUSCULAR; INTRAVENOUS at 00:48

## 2022-07-13 RX ADMIN — THIAMINE HCL TAB 100 MG 100 MG: 100 TAB at 09:05

## 2022-07-13 RX ADMIN — Medication 1 CAPSULE: at 21:37

## 2022-07-13 RX ADMIN — HYDROXYZINE HYDROCHLORIDE 50 MG: 25 TABLET ORAL at 13:58

## 2022-07-13 RX ADMIN — MYCOPHENOLATE MOFETIL 1000 MG: 200 POWDER, FOR SUSPENSION ORAL at 21:39

## 2022-07-13 RX ADMIN — HEPARIN SODIUM 5000 UNITS: 5000 INJECTION, SOLUTION INTRAVENOUS; SUBCUTANEOUS at 06:31

## 2022-07-13 RX ADMIN — ONDANSETRON 4 MG: 2 INJECTION INTRAMUSCULAR; INTRAVENOUS at 21:16

## 2022-07-13 RX ADMIN — PROCHLORPERAZINE EDISYLATE 10 MG: 5 INJECTION INTRAMUSCULAR; INTRAVENOUS at 09:24

## 2022-07-13 RX ADMIN — LEVALBUTEROL HYDROCHLORIDE 1.25 MG: 1.25 SOLUTION RESPIRATORY (INHALATION) at 19:54

## 2022-07-13 RX ADMIN — SODIUM BICARBONATE 1300 MG: 650 TABLET ORAL at 13:59

## 2022-07-13 RX ADMIN — MIDODRINE HYDROCHLORIDE 20 MG: 5 TABLET ORAL at 00:48

## 2022-07-13 RX ADMIN — INSULIN ASPART 3 UNITS: 100 INJECTION, SOLUTION INTRAVENOUS; SUBCUTANEOUS at 17:47

## 2022-07-13 RX ADMIN — NYSTATIN 1000000 UNITS: 100000 SUSPENSION ORAL at 09:06

## 2022-07-13 ASSESSMENT — ACTIVITIES OF DAILY LIVING (ADL)
ADLS_ACUITY_SCORE: 32

## 2022-07-13 NOTE — PROGRESS NOTES
Nephrology Progress Note  07/13/2022            Sofie Rodriguez is a 62 yom with hx of HTN, Hep C, osetopenia, previous polysubstance use (stopped 2019) and severe COPD who is s/p BSLT on 6/28/2022.  Had ~2.5h of bypass time, was uncomplicated but now has post op BACILIO in setting of continued need for vasopressors thought to be due to vasoplegia.  Had radial artery thrombus requiring thrombectomy on 7/2.  Nephrology consulted for BACILIO with mild hyperkalemia and oliguric UOP.       Interval History :   Mrs Higgins's Cr continues to increase and BUN is now up to 150 suggestive of poor clearance despite reasonable UOP with high dose lasix yesterday.  Given this we are planning on tunneled line placement tomorrow and initiation of iHD with short, low flow run.  Long term still may recover but with ongoing need for tacro with up and down levels with ? C-diff we are planning RRT for at least the near term.  I attempted to call her son again today to discuss but was again unable to reach him and there was no voicemail.       Assessment & Recommendations:   BACILIO-Normal baseline renal fx historically and at time of surgery.  No renal imaging but low suspicion of obstruction so will consider if she does not show improvement.  BACILIO is likely hypoperfusion with ~2.5h of bypass time and tacrolimus although levels have been within range (needing higher doses of tacro).   Will manage this medically for now and continue to monitor.  UA initially showed hyaline casts on 6/28..  Noted that she did not get contrast for thrombectomy on 7/2.  Cr had started to improve but now again on the rise.                -BACILIO, likely hypoperfusion and need for tacro, also now with supratherapeutic vanco.   With lack of improvement and ongoing need for tacro with up and down levels we are planning on tunneled line placement tomorrow and starting RRT for clearance.                 -Continue to avoid nephrotoxins, norepi weaned off, would keep SBP >100 as  "able, hypotensive due to vasoplegia.       Volume-UOP 1L with 120mg lasix x3 yesterday, was net negative nearly 1L.  So far slightly negative today but mostly due to chest tube output.  Planning on line placement tomorrow but main issue pushing for starting RRT is clearance.      Electrolytes-K 4.5, bicarb 42, ABG 7.36/79/107/44.  Hypercapnia thought to be related to longstanding COPD and should get better with time.       BMD-Ca up at 8.6, iCal WNL throughout, will follow.  Mg and Phos mildly up consistent with BACILIO.       Lung Tx-On Tacro, levels have been within range, last level 4.8.  Has hypercapnia despite transplant and reasonable CXR/oxygenation, team expects this to improve with time.       Anemia-Hgb 7.1, acute management per team.       Nutrition-Started on TF, changed to renal TF     Time spent: 30 minutes on this date of encounter for chart review, physical exam, medical decision making and co-ordination of care.      Seen and discussed with Dr Bauman     Recommendations were communicated to primary team via verbal communication.    RUFUS Cedeño CNS  Clinical Nurse Specialist  [phone]    Review of Systems:   I reviewed the following systems:  Gen: No fevers or chills  CV: No CP at rest  Resp: No SOB at rest  GI: No N/V    Physical Exam:   I/O last 3 completed shifts:  In: 1404.42 [I.V.:351.92; NG/GT:457.5]  Out: 2051 [Urine:1116; Stool:300; Chest Tube:635]   /51   Pulse 87   Temp 98.3  F (36.8  C) (Axillary)   Resp 12   Ht 1.575 m (5' 2\")   Wt 75.6 kg (166 lb 10.7 oz)   SpO2 99%   BMI 30.48 kg/m       GENERAL APPEARANCE: On NC, in mild distress.    EYES: No scleral icterus  NECK:  No JVD  Pulmonary: lungs clear to auscultation with equal breath sounds bilaterally, no clubbing or cyanosis  CV: Regular rhythm, normal rate, no rub   - Edema+2 generalized.   GI: soft, nontender, normal bowel sounds  MS: no evidence of inflammation in joints, no muscle tenderness  : + Dong  SKIN: " no rash, warm, dry  NEURO: Answering questions appropriately, no focal deficits.       Labs:   All labs reviewed by me  Electrolytes/Renal - Recent Labs   Lab Test 07/13/22  0918 07/13/22 0434 07/13/22 0428 07/12/22 2055 07/12/22  1555 07/12/22  1552 07/12/22  1136 07/12/22 0419 07/12/22 0414 07/11/22  0805 07/11/22 0438   NA  --   --  139  --  136  --  137  --  137   < > 139   POTASSIUM  --   --  4.5  --  4.5  --  4.2  --  4.4   < > 4.4   CHLORIDE  --   --  89*  --  91*  --  92*  --  93*   < > 93*   CO2  --   --  42*  --  38*  --  37*  --  35*   < > 38*   BUN  --   --  150.0*  --  134.0*  --  143.0*  --  134.0*   < > 126.0*   CR  --   --  3.23*  --  3.06*  --  3.15*  --  3.11*   < > 2.78*   * 240* 239*   < > 201*   < > 195*   < > 253*   < > 233*   ESTUARDO  --   --  8.6*  --  8.4*  --  8.5*  --  8.4*   < > 8.4*   MAG  --   --  2.8*  --   --   --   --   --  3.1*  --  2.7*   PHOS  --   --  5.0*  --   --   --   --   --  5.4*  --  5.4*    < > = values in this interval not displayed.       CBC -   Recent Labs   Lab Test 07/13/22 0428 07/12/22 1136 07/12/22 0414 07/11/22 0438   WBC 10.4  --  15.2* 18.4*   HGB 7.1* 7.1* 7.2* 7.7*     --  286 287       LFTs -   Recent Labs   Lab Test 07/11/22 0438 07/07/22  0323 07/06/22 0337 07/04/22 0323   ALKPHOS 78 63 59 60   BILITOTAL <0.2 <0.2 <0.2 <0.2   ALT 22 24 22 24   AST 16 23 24 20   PROTTOTAL 4.6* 4.6* 4.4* 4.4*   ALBUMIN 2.7* 2.8*  --  2.6*       Iron Panel - No lab results found.        Current Medications:    acetaminophen  975 mg Oral Q8H     acetylcysteine  2 mL Nebulization 4x Daily     aspirin  81 mg Oral Daily     calcium carbonate 600 mg-vitamin D 400 units  1 tablet Oral BID w/meals     heparin ANTICOAGULANT  5,000 Units Subcutaneous Q8H TONY     insulin aspart  1-12 Units Subcutaneous Q4H     insulin glargine  30 Units Subcutaneous QAM AC     lactobacillus rhamnosus (GG)  1 capsule Oral BID     levalbuterol  1.25 mg Nebulization 4x Daily      midodrine  20 mg Oral or Feeding Tube Q8H     [START ON 7/14/2022] multivitamin, therapeutic  1 tablet Per Feeding Tube Daily     mycophenolate  1,000 mg Oral or Feeding Tube BID     nystatin   Topical BID     nystatin  1,000,000 Units Swish & Swallow 4x Daily     pantoprazole  40 mg Oral or Feeding Tube Daily     [START ON 7/14/2022] predniSONE  15 mg Per Feeding Tube BID     predniSONE  17.5 mg Oral BID w/meals     protein modular  1 packet Per Feeding Tube Daily     sodium bicarbonate  1,300 mg Oral or Feeding Tube TID     sodium chloride (PF)  3 mL Intracatheter Q8H     tacrolimus  5 mg Per Feeding Tube BID IS     thiamine  100 mg Oral or Feeding Tube Daily     valGANciclovir  450 mg Oral or NG Tube Once per day on Mon Thu     vancomycin  125 mg Oral or Feeding Tube Q6H TONY       dextrose Stopped (07/12/22 1400)     heparin for  units in  mL (1 unit/mL for Interventional Radiology) 3 Units/hr (07/06/22 2016)

## 2022-07-13 NOTE — CONSULTS
COPD s/p bilateral lung transplant 6/28/22 developed BACILIO prompting right IJ CVC placed 6/28 and she is not improving and will require dialysis for more time. IR consulted for tunneled HD line placement tomorrow, 7/14. Madan from nephrology. Do not remove non tunneled line, they are using for access.    Rakesh Dewey PA-C  Interventional Radiology  [phone]

## 2022-07-13 NOTE — PROGRESS NOTES
Pulmonary Medicine  Cystic Fibrosis - Lung Transplant Team  Progress Note  2022       Patient: Sofie Rodriguez  MRN: [id number]  : 1960 (age 62 year old)  Transplant: 2022 (Lung), POD#15  Admission date: 2022    Assessment & Plan:     Sofie Rodriguez is a 62 year old female with a PMH significant for end stage COPD, HTN, HFpEF, Mycobacterium peregrinum colonization, h/o hepatitis C, HECTOR s/p LEEP procedure, osteopenia, and former methamphetamine use.  Pt. is now s/p BSLT on 22.  Surgery on CPB, lungs slightly undersized.   Required moderate volume resuscitation with low dose pressors post-op.  Extubated .  Persistent hypercapnia, some improvement with BiPAP.  Etiology unclear but appears to be respiratory drive problem.  Prior persistent low dose pressor requirement of unclear etiology, weaned off 7/10, remains on scheduled midodrine.  Post-op course also notable for left radial artery thrombus (presumed secondary to arterial line) s/p thrombectomy , BACILIO, and C diff.  Transferred from CVICU to MICU service on .     Today's recommendations:  - DSA, EBV, IgG, and donor risk labs ordered   - Continue BiPAP intermittently during the day and overnight as tolerated given persistent hypercapnia  - Discussed decline in left chest tube output with CVTS (will monitor)  - CXR daily as below  - Tacrolimus resumed at lower dose this morning, level to trend therapeutic, daily levels ordered through  for close monitoring  - Prednisone taper ordered tomorrow  - Continue to hold Bactrim for PJP ppx given BACILIO, revisit dapsone in the next 1-2+ days pending anemia  - Follow bronch cultures (), send AFB cultures with all future bronchs  - C diff positive, PO vancomycin per ICU team     S/p bilateral sequential lung transplant (BSLT) for end stage COPD:  Persistent hypercapneic respiratory failure:   Persistent hypotension:   Bilateral hydroPTX: Explant pathology with severe emphysema  with subpleural bullae formation, changes of chronic bronchitis, subpleural scars and patchy pulmonary edema; benign hilar lymph nodes.  No evidence of PGD post-op.  Extubated 6/30.  Persistent hypercapnia without dyspnea, appears to be a respiratory drive problem.  Diaphragm assessment by US did not show dysfunction (per CVICU team) and metabolic cart study only 6 mins but not supportive of overfeeding.  Some improvement with BiPAP, limited tolerance in part due to anxiety.  Chest CT (7/7) with bilateral small hydroPTX, chest wall subcutaneous emphysema, air collection of right chest wall anteriorly associated with pleura on right, and some narrowing of left sided anastomosis.  Persistent low dose pressor requirement, weaned off 7/10, remains on scheduled midodrine (also s/p hydrocortisone 7/6-7/9 and fludrocortisone 7/6-7/11).  Bronch (7/12) given CXR changes with small amount of granulation tissue posteriorly in right anastomosis (no stenosis or dehiscence), thick brown secretions in proximal airways, and right mainstem/RUL mucous plug (removed).  On 1L NC with intermittent use of BiPAP.  - DSA one month post-transplant (7/28, ordered)  - Ammonia monitoring twice weekly x3 weeks (screening for hyperammonemia post-lung transplant)  - Nebs: levalbuterol and Mucomyst QID   - Pulmonary toilet with chest physiotherapy QID along with Aerobika and incentive spirometry hourly while awake  - Supplemental oxygen as needed to maintain SpO2 >92%, continue BiPAP intermittently during the day and overnight as tolerated given persistent hypercapnia, ABG monitoring per ICU team  - Chest tubes managed by surgical team, discussed decline in left chest tube output with CVTS (will monitor)  - Daily CXR, today with improved aeration to left lung base, decreased left pleural effusion, decreased subpleural apical opacities, and unchanged retrocardiac and perihilar opacities (personally reviewed)  - Recommend eventual SNIFF test to  evaluate diaphragm function     Immunosuppression:  Induction therapy with basiliximab (and high dose IV steroid) given intraoperatively, repeating basiliximab dose on POD#4.  - Tacrolimus 5 mg BID (via NJ tube, resumed 7/13, prior held given supratherapeutic, peak level appropriate 7/11).  Goal level 8-12.  Repeat level daily through 7/16 (ordered).  - MMF 1000 mg BID (decreased 7/10 due to diarrhea)  - Prednisone 17.5 mg BID (resumed 7/9, held while on hydrocortisone as above), taper due 7/14 (ordered)  Date AM dose (mg) PM dose (mg)   6/30/22 17.5 17.5   7/14/22 15 15   7/21/22 15 12.5   7/28/22 12.5 12.5   8/4/22 12.5 10   8/18/22 10 10   9/15/22 10 7.5   10/13/22 7.5 7.5   11/10/22 7.5 5   12/8/22 5 5   1/5/23 5 2.5      Prophylaxis:   - Bactrim for PJP ppx on hold since 7/7 for BACILIO (G6PD 14.1 on 6/14 if need to consider dapsone as alternative, revisit in 1-2+ days pending anemia)  - VGCV for CMV ppx (started 7/7)  - Nystatin for oral candidiasis ppx, 6 month course  - See below for serologies and viral ppx:    Donor Recipient Intervention   CMV status Positive Positive Valganciclovir POD #8-90   EBV status Positive Positive EBV check monthly (7/28, ordered)   HSV status N/A Positive Not indicated      ID: IgG adequate (1,381) at time of transplant.  Prior history of colonization with Mycobacterium peregrinum.  Donor bronch cultures (OSH) with Strep beta hemolytic, not group A.  Febrile to 100.6 (6/30), resolved within 24 hours.  Blood cultures most recently negative 7/4.  - IgG at one month (7/28, ordered)  - Bronch cultures (7/12) NGTD, follow  - AFB bronch culture (6/28, 6/29) NGTD, AFB to be sent on all future bronchs     Streptococcus pneumoniae:  Stenotrophomonas maltophilia: Noted in recipient cultures at time of transplant.  S/p ceftazidime 6/28-7/10, vancomycin 7/7-7/8 and 6/28-6/30, and levofloxacin 7/10-7/12 for total 2 week ABX course.    H/o hepatitis C: Diagnosed in 1980s, 2 mos of treatment,  "quant negative since 10/2017, last positive 2/20/17 ([phone]).  Glenis positive on 6/2021 with negative HCV PCR.  H/o remote EtOH abuse.  MR elastography (4/27/21) with hepatology review and consult without any concerns post transplant.  Hepatitis C RNA negative and hepatitis C antibody positive (old) on 6/28.     PHS risk criteria donor:  Additional labs required post-transplant (between 4-8 weeks post-op): Hepatitis B, Hepatitis C, and HIV by SHERI ([id number], ordered 7/28).     Other relevant problems managed by primary team:     Left hand ischemia: Radial artery thrombosis identified on duplex doppler.  S/p thrombectomy on 7/2.  Completed high intensity heparin.  Continue daily aspirin.     BACILIO:   Hyperkalemia: Likely multifactorial including Bactrim and hypotension.  Tacrolimus now supratherapeutic 7/11 (previously subtherapeutic).  Volume status is unclear.  Hypotension and rising Cr suggest volume depletion but intake has generally been greater than output each day and patient does have some dependent edema.  Nephrology consulted, following.  Fludrocortisone started 7/6 as above and also in setting of hyperkalemia (K+ normal since 7/7).    - Tacrolimus monitoring as above  - Monitor potassium with stopping Florinef as above  - Volume management per nephrology and ICU team, revisit need for dialysis daily per nephrology      Abdominal pain:  Diarrhea:   C diff positive: Patient reports \"crampy\" abdominal pain since at least 7/8.  AXR 7/8 without dilated bowel, moderate colonic stool burden.  Also with diarrhea, rectal tube placed overnight 7/10 to 7/11.  C diff positive 7/11.  S/p Flagyl 7/11.  - PO vancomycin (7/11) per ICU team     History of steroid induced hyperglycemia: Well controlled as OP.  - May need endocrine consult prior to discharge    We appreciate the excellent care provided by the MICU team.  Recommendations communicated via in person rounding and this note.  Will continue to follow along closely, " "please do not hesitate to call with any questions or concerns.    Patient discussed with Dr. Miller.    SHYAM GarciaC  Inpatient EMILY  Pulmonary CF/Transplant     Subjective & Interval History:     Intermittently using BiPAP, limited tolerance due to anxiety.  Ongoing hypercapnia.  Otherwise on 1L NC.  Denies dyspnea.  Minimal cough or sputum, cough remains weak.  Received chest physiotherapy once yesterday.  Brief run of SVT last night with lower blood pressure per nursing.  Increased urine output following diuresis, Cr and BUN further elevated.  Abdominal pain and nausea ongoing although improved.    Review of Systems:     C: No fever, no recent change in weight, + no appetite  INTEGUMENTARY/SKIN: No rash or obvious new lesions  ENT/MOUTH: No sore throat, no sinus pain, no nasal congestion or drainage  RESP: See interval history  CV: No chest pain, + peripheral edema  GI: No vomiting, + loose stools (rectal tube)  : See interval history  MUSCULOSKELETAL: + improving incisional pain  ENDOCRINE: + blood sugars intermittently elevated  NEURO: No headache  PSYCHIATRIC: See interval history    Physical Exam:     All notes, images, and labs from past 24 hours (at minimum) were reviewed.    Vital signs:  Temp: 98.3  F (36.8  C) Temp src: Axillary BP: 111/51 Pulse: 87   Resp: 12 SpO2: 99 % O2 Device: Nasal cannula Oxygen Delivery: 1 LPM Height: 157.5 cm (5' 2\") Weight: 75.6 kg (166 lb 10.7 oz)  I/O:     Intake/Output Summary (Last 24 hours) at 7/13/2022 1159  Last data filed at 7/13/2022 1000  Gross per 24 hour   Intake 1074.5 ml   Output 2263 ml   Net -1188.5 ml     Constitutional: Lying in bed, in no apparent distress.   HEENT: Eyes with pink conjunctivae, anicteric.  Oral mucosa moist without lesions.  Nasal FT.  PULM: Diminished air flow bilaterally.  No crackles, no rhonchi, no wheezes.  Non-labored breathing on 1L NC.  CV: Normal S1 and S2.  RRR.  + systolic murmur.  2+ BLE edema.   ABD: NABS, soft, nontender, " nondistended.    MSK: Moves all extremities.  NEURO: Alert, minimally conversant.   SKIN: Warm, dry.  No rash on limited exam.  Clamshell incision partially visualized (left/central aspect) and intact (right side not visualized as covered).  PSYCH: Mood stable.     Lines, Drains, and Devices:  CVC Double Lumen Right Internal jugular (Active)   Site Assessment WDL 07/13/22 0400   Dressing Type Chlorhexidine disk;Transparent 07/13/22 0400   Dressing Status clean;dry;intact 07/13/22 0400   Dressing Intervention new dressing;dressing changed 07/12/22 1600   Dressing Change Due 07/17/22 07/12/22 2000   Tubing Change secondary tubing;primary tubing 06/29/22 0400   Line Necessity yes, meets criteria 07/13/22 0400   Brown - Status saline locked 07/13/22 0400   Brown - Cap Change Due 07/12/22 07/12/22 2000   White - Status saline locked 07/13/22 0400   White - Cap Change Due 07/05/22 07/12/22 2000   Phlebitis Scale 0-->no symptoms 07/13/22 0400   Infiltration? no 07/13/22 0400   Infiltration Scale 0 07/12/22 1600   Infiltration Site Treatment Method  None 07/12/22 1600   Was a vesicant infusing? no 07/12/22 1600   CVC Comment MAC capped 07/12/22 1600   Number of days: 15       Left Radial Interventional Procedure Access (Active)   Site Assessment WDL 07/13/22 0400   Hemostasis management Unchanged 07/13/22 0400   CMS Left Arm WDL 07/13/22 0400   Radial Pulse - Left Arm Normal 07/13/22 0400   Number of days: 11     Data:     LABS    CMP:   Recent Labs   Lab 07/13/22  0918 07/13/22  0434 07/13/22  0428 07/13/22  0100 07/12/22  2055 07/12/22  1555 07/12/22  1552 07/12/22  1136 07/12/22  0419 07/12/22  0414 07/11/22  0805 07/11/22  0438 07/10/22  0750 07/10/22  0427 07/07/22  0406 07/07/22  0323   NA  --   --  139  --   --  136  --  137  --  137   < > 139   < > 138   < > 136   POTASSIUM  --   --  4.5  --   --  4.5  --  4.2  --  4.4   < > 4.4   < > 4.2   < > 4.5   CHLORIDE  --   --  89*  --   --  91*  --  92*  --  93*   < > 93*    < > 91*   < > 90*   CO2  --   --  42*  --   --  38*  --  37*  --  35*   < > 38*   < > 39*   < > 32*   ANIONGAP  --   --  8  --   --  7  --  8  --  9   < > 8   < > 8   < > 14   * 240* 239* 232*   < > 201*   < > 195*   < > 253*   < > 233*   < > 198*   < > 183*   BUN  --   --  150.0*  --   --  134.0*  --  143.0*  --  134.0*   < > 126.0*   < > 114.0*   < > 94.8*   CR  --   --  3.23*  --   --  3.06*  --  3.15*  --  3.11*   < > 2.78*   < > 2.39*   < > 1.99*   GFRESTIMATED  --   --  16*  --   --  17*  --  16*  --  16*   < > 19*   < > 23*   < > 28*   ESTUARDO  --   --  8.6*  --   --  8.4*  --  8.5*  --  8.4*   < > 8.4*   < > 8.5*   < > 8.1*   MAG  --   --  2.8*  --   --   --   --   --   --  3.1*  --  2.7*  --  2.9*   < > 2.6*   PHOS  --   --  5.0*  --   --   --   --   --   --  5.4*  --  5.4*  --  5.4*   < > 5.6*   PROTTOTAL  --   --   --   --   --   --   --   --   --   --   --  4.6*  --   --   --  4.6*   ALBUMIN  --   --   --   --   --   --   --   --   --   --   --  2.7*  --   --   --  2.8*   BILITOTAL  --   --   --   --   --   --   --   --   --   --   --  <0.2  --   --   --  <0.2   ALKPHOS  --   --   --   --   --   --   --   --   --   --   --  78  --   --   --  63   AST  --   --   --   --   --   --   --   --   --   --   --  16  --   --   --  23   ALT  --   --   --   --   --   --   --   --   --   --   --  22  --   --   --  24    < > = values in this interval not displayed.     CBC:   Recent Labs   Lab 07/13/22 0428 07/12/22  1136 07/12/22  0414 07/11/22  0438 07/10/22  0427   WBC 10.4  --  15.2* 18.4* 17.9*   RBC 2.31*  --  2.31* 2.47* 2.56*   HGB 7.1* 7.1* 7.2* 7.7* 7.9*   HCT 23.7*  --  24.3* 25.5* 26.0*   *  --  105* 103* 102*   MCH 30.7  --  31.2 31.2 30.9   MCHC 30.0*  --  29.6* 30.2* 30.4*   RDW 16.3*  --  16.3* 16.5* 16.1*     --  286 287 345       INR: No lab results found in last 7 days.    Glucose:   Recent Labs   Lab 07/13/22  0918 07/13/22 0434 07/13/22 0428 07/13/22  0100 07/12/22 2055  07/12/22  1555   * 240* 239* 232* 167* 201*       Blood Gas:   Recent Labs   Lab 07/13/22  0428 07/12/22  1555 07/12/22  1315   O2PER 30 30 28       Culture Data No results for input(s): CULT in the last 168 hours.    Virology Data:   Lab Results   Component Value Date    FLUAH1 Not Detected 06/29/2022    FLUAH3 Not Detected 06/29/2022    YT3349 Not Detected 06/29/2022    IFLUB Not Detected 06/29/2022    RSVA Not Detected 06/29/2022    RSVB Not Detected 06/29/2022    PIV1 Not Detected 06/29/2022    PIV2 Not Detected 06/29/2022    PIV3 Not Detected 06/29/2022    HMPV Not Detected 06/29/2022       Historical CMV results (last 3 of prior testing):  No results found for: CMVQNT  No results found for: CMVLOG    Urine Studies    Recent Labs   Lab Test 07/08/22  0831 07/05/22  1004   URINEPH 5.5 5.5   NITRITE Negative Negative   LEUKEST Negative Negative   WBCU 1 5       Most Recent [facility] Pulmonary Function Testing (FVC/FEV1 only)  FVC-Pre   Date Value Ref Range Status   04/29/2022 1.82 L    11/11/2021 2.17 L    06/14/2021 2.00 L      FVC-%Pred-Pre   Date Value Ref Range Status   04/29/2022 58 %    11/11/2021 70 %    06/14/2021 64 %      FEV1-Pre   Date Value Ref Range Status   04/29/2022 0.51 L    11/11/2021 0.53 L    06/14/2021 0.54 L      FEV1-%Pred-Pre   Date Value Ref Range Status   04/29/2022 20 %    11/11/2021 21 %    06/14/2021 21 %        IMAGING    Recent Results (from the past 48 hour(s))   XR Chest Port 1 View    Narrative    Exam: XR CHEST PORT 1 VIEW, 7/12/2022 5:50 AM    Indication: chest tubes s/p bilateral lung transplant    Comparison: 7/11/2022    Findings:   Feeding tube is seen coursing through the mediastinum. Tip projects  off the film but is at least to the stomach. Mediastinal drain and  right basilar chest tube is unchanged. Left basilar chest tube in  similar position. Right IJ sheath tip at the confluence of the  innominate veins. Right arm midline in the axillary vein.     Increasing  subpleural opacities at the apices. Continued perihilar and  lower lobe mixed opacities. Stable cardiomegaly.      Impression    Impression:   1. Increasing pleural effusions at the apices, possibly loculated.  2. Stable support devices.  3. Stable perihilar and lower lobe mixed opacities suggesting edema.    GABRIELLA SNELL MD         SYSTEM ID:  [id number]   XR Abdomen Port 1 View    Narrative    Exam: XR ABDOMEN PORT 1 VIEWS 7/12/2022 11:00 AM    Indication: Abdominal pain    Comparison: Radiograph 7/8/2022    Findings:   Two AP supine views of the abdomen. Partially visualized postsurgical  changes of bilateral left lung transplant with clamshell sternotomy  wires. Bilateral chest tubes and mediastinal drains in place.  Bilateral pleural effusions, left greater than right.    Enteric tube with tip projecting over the third portion of the  duodenum. Nonobstructive bowel gas pattern. Large stool burden in the  left splenic flexure and descending colon. No pneumatosis or portal  venous gas. Nonspecific new densities projecting over the pelvis,  likely intraluminal or external to the patient. No acute osseous  abnormalities.        Impression    Impression:   1. Nonobstructive bowel gas pattern without pneumatosis. Large stool  burden in the left splenic flexure and descending colon.  2. The feeding tube tip projects over the third portion of the  duodenum.    I have personally reviewed the examination and initial interpretation  and I agree with the findings.    NADIR NOLEN MD         SYSTEM ID:  [id number]   XR Chest Port 1 View    Narrative    EXAM: XR CHEST PORT 1 VIEW  7/13/2022 6:00 AM     HISTORY:  chest tubes and R hydropneumothorax s/p bilateral lung  transplant       COMPARISON:  Chest x-ray 7/12/2022    FINDINGS: AP radiograph of the chest. Postoperative changes of  bilateral lung transplant with intact median clamshell sternotomy  wires. Right IJ sheath with tip at the innominate confluence. Right  arm  midline catheter with tip overlying the right axilla. Stable  positioning of bibasilar chest tubes and mediastinal drain.    Stable cardiomediastinal silhouette. Atherosclerotic calcifications of  the aortic arch. Improved aeration of the left lung base. Small left  pleural effusion. No appreciable pneumothorax. Continued dense  retrocardiac and patchy left perihilar airspace opacities. Decreased  subpleural opacities at the lung apices.      Impression    IMPRESSION:   1. Improved aeration to the left lung base.  2. Decreased left pleural effusion.  3. Decreased subpleural apical opacities.  4. Unchanged retrocardiac and perihilar opacities, likely pulmonary  edema and/or atelectasis.    I have personally reviewed the examination and initial interpretation  and I agree with the findings.    YANNICK BENAVIDEZ MD         SYSTEM ID:  [id number]

## 2022-07-13 NOTE — H&P
[facility]    ICU History and Physical    Primary Team: MICU  Reason for Critical Care Admission: Acute hypercapnic respiratory failure  Admitting Physician: Dr. Sara Daigle   Date of Admission:  6/28/2022    Assessment: Critical Care   Sofie Rodriguez is a 62 year old female with PMHx of end stage COPD s/p b/l lung transplant (6/28/22), HTN, HFpEF, hx of hepatitis C, osteopenia, and former methamphetamine use admitted to the medical ICU on 7/13/2022 with hospital course complicated by left upper extremity acute limb ischemia s/p left radial thrombectomy on 7/1/22. Remains in ICU for acute hypercapnic respiratory failure intermittently on BIPAP and worsening BACILIO.     Plan: Critical Care      Daily updates:  -Decrease oxycodone from 5 to 2.5mg q4h PRN  -NPO at MN for HD line 7/14 w/ IR   -BiPAP  -NaHC03 1300 TID      Neuro/ pain/ sedation:  # Pain  Postoperative pain noted by surgery team. On 7/13 admission interview, does not endorse pain.  - Scheduled: acetaminophen, robaxin  - Decrease oxycodone from 5 to 2.5mg q4h PRN    # Anxiety  Patient becomes anxious and uncomfortable while on BIPAP, thereby limiting use.  - Encourage use of BIPAP at night and during daytime naps  - Consider changing form of BIPAP mask if uncomfortable  - Atarax 25-50 q6hr PRN    Pulmonary:  # S/p BOLT (6/28)  # Postoperative ventilatory support  # Acute hypercapnic respiratory failure, likely 2/2 decreased central respiratory drive vs respiratory muscle weakness  Hx of COPD s/p bilateral lung transplant on 6/28. By chart review, since 7/2 noted to have hypercarbia not adequately compensated for by respiratory function. On exam, patient is noted to take shallow, infrequent breaths with occasional hyperventilation. Differential for hypercapnic respiratory failure includes decreased central respiratory drive vs respiratory muscle weakness vs intrinsic lung pathology. Patient has been on minimal  sedating medications, is mentating appropriately decreasing concern for encephalitis, and had normal TSH last week decreasing concern for hypothyroidism. Diaphragmatic weakness s/p transplant uncommon, yet can occur. Bedside U/S per CVICU team showed normal diaphragmatic movement, yet formal evaluation via sniff test not yet complted. Transplanted lungs also noted to be small for body size; could be contributor to decreased respiratory compensation for hypercarbia. pH improves when patient on BIPAP; last ABG on 7/13 showed pH 7.35 while on nasal cannula, however patient still noted to have elevated pCO2 and bicarb. Last bronch 7/12 for clearance/inspection given weak cough.  - F/u bronch cultures from 7/12  - Repeat VBGs 7/13 PM and 7/14 AM  - BIPAP usage at night and during daytime naps; nasal cannula otherwise  - Supplemental oxygen to keep saturation about 92%  - Consider sniff test with fluoroscopy 7/14 to evaluate diaphragm function    Immunosuppression  Prednisone 17.5mg BID --> 15mg BID starting on 7/14  Tacrolimus 5mg BID  MMF 1000mg BID    Prophylaxis  CMV - Valgancyclovir   Thrush - PO Nystatin  PJP - TMP-SMX (held d/t BACILIO)    # Bilateral surgical chest tubes  - Daily CXR  - CV surgery team managing and following    Cardiovascular:  # Shock, etiology unclear, improving  # Hx of HTN  # Hx of HFpEF  Off levophed gtt since 7/10. 7/7 echo unchanged from previous, showing preserved EF with normal LV function making new cardiogenic cause of hypotension less likely. S/p hydrocortisone 7/6-7/9 and fludrocortisone 7/6-7/11, which helped minimally, making adrenal insufficiency less likely. Intravascular depletion possible although minimal changes with increased diuresis. Previously on midodrine 30mg TID, now decreased to 20mg as of 7/13.  - Midodrine 20mg TID  - Hold PTA lasix 20mg qd  - CTM    GI/Nutrition:  # C. diff positive (7/11)  # Abdominal pain  # Diarrhea  7/12 KUB showing stool burden. Frequent loose  stools via rectal pouch. Worsening diarrhea, flagyl dose x1 given. C. Diff positive on 7/11 with vancomycin started 7/12.  - Vancomycin 125mg PO QID (7/12-7/26)  - Hold bowel regimen: miralax, milk of magnesia prn d/t diarrhea    # NJ tube   Adult Formula Drip Feeding: Continuous Novasource Renal; Nasojejunal; Goal Rate: 35; initiate at goal rate; mL/hr; Medication - Feeding Tube Flush Frequency: At least 15-30 mL water before and after medication administration and with tube clogging...  NPO per Anesthesia Guidelines for Procedure/Surgery Except for: Meds    - TF at goal; tolerating PO intake  - Nutrition following    Renal/ Fluid Balance:   # BACILIO, likely mixed prerenal/intrinsic etiology  # Hypermagnesemia  # Hyperphosphatemia  Normal baseline renal function prior to and at time of surgery. Likely multifactorial due to hypotension (2.5h bypass during surgery), causing prerenal injury, and prior administration of nephrotoxic drugs including tacrolimus and vancomycin. 6/28 UA showed hyaline casts. Cr had initially improved, but now in combination with BUN, is continuing to rise. Nephrology on 7/13 believes BUN of 150 suggests poor clearance despite reasonable UOP, thereby increasing motivation to start iHD.  - Strict I/O; daily weights  - Nephrology following; recs appreciated   - IR consulted - to place tunneled HD line 7/14 AM   - SQH not held per IR   -NPO at midnight   -NaHC03 1300 TID      Endocrine:  # Stress-induced hyperglycemia  - High dose sliding scale insulin started 7/2    ID:  # Streptococcus pneumoniae  # Stenotrophomonas maltophilia  Noted in recipient cultures at time of transplant. S/p ceftazidime 6/28-7/10, vancomycin 6/28-6/30 and 7/7-7/8, and IV levofloxacin 7/10-7/12 for total 2 week abx course.  - CTM    Hematology:  # Acute blood loss anemia, stable  # LUE limb ischemia s/p L radial thrombectomy (7/1/22)  Hgb stable at 7.1.  - CTM with daily CBC  - Transfuse if hgb <7  - On SQH    #  Leukocytosis, improved  WBC count wnl 7/13 at 10.4.  - CTM    MSK:   PT and OT consulted. Appreciate recommendations.    Skin  No active concerns.     Lines/ tubes/ drains:  Dong Catheter: PRESENT, indication: Strict 1-2 Hour I&O, Strict 1-2 Hour I&O, Strict 1-2 Hour I&O  Central Lines: PRESENT  CVC Double Lumen Right Internal jugular-Site Assessment: WDL    Prophylaxis:  - DVT Prophylaxis: Heparin SQ  - PUD Prophylaxis: PPI    Code Status: Full Code      Disposition:  - ICU    The patient's care was discussed with the Attending Physician, Dr. Daigle.    Clinically Significant Risk Factors Present on Admission                       CARLOS NORTH, MS4  Resident/Fellow Attestation   I, Khushboo North MD, was present with the medical/EMILY student who participated in the service and in the documentation of the note.  I have verified the history and personally performed the physical exam and medical decision making.  I agree with the assessment and plan of care as documented in the note.      Khushboo North MD  PGY2  Date of Service (when I saw the patient): 07/13/22      [facility]  Securely message with the Vocera Web Console (learn more here)  Text page via [employer] Paging/Directory     ______________________________________________________________________    Chief Complaint   Persistent hypercapnia    History is obtained from the patient and electronic health record    History of Present Illness   Sofie Rodriguez is a 62 year old female with end stage oxygen-dependent COPD  HTN, HFpEF, hx of hepatitis C, osteopenia, and former methamphetamine use who presented to the hospital on 6/28 for bilateral lung transplant. She was admitted to the CVICU on 6/29 for hypotension requiring pressor support, postoperative ventilation, and postoperative management. Her hospital course has been complicated by left upper extremity acute limb ischemia s/p left radial thrombectomy on 7/1/22 and  "sputum airway cultures positive for stenotrophomonas and strep pneunomiae now s/p antibiotic treatment. On 7/11 she tested positive for C diff, and has since been initiated on PO vancomycin treatment. On 7/13, she was transferred to the medical ICU for acute hypercapnic respiratory failure beginning on 7/2 intermittently improving on BIPAP and a worsening BACILIO. On interview, patient is somnolent, sluggish to respond, but mentating appropriately. She is able to respond to questions with brief answers. She knows she is in the hospital for a \"lung transplant.\" ROS as below.    Review of Systems    ROS significant for sleepiness; denies difficulty breathing, abdominal pain, chest pain, SOB.    Past Medical History    I have reviewed this patient's medical history and updated it with pertinent information if needed.     Past Medical History:   Diagnosis Date     CHF (congestive heart failure) (H)      COPD (chronic obstructive pulmonary disease) (H)      Hepatitis 2017    Hep C, [facility]     HTN (hypertension)      Osteopenia        Past Surgical History   I have reviewed this patient's surgical history and updated it with pertinent information if needed.  Past Surgical History:   Procedure Laterality Date     COLONOSCOPY  2015     CV CORONARY ANGIOGRAM N/A 6/30/2021    Procedure: CV CORONARY ANGIOGRAM;  Surgeon: Alexander Cuellar MD;  Location:  HEART CARDIAC CATH LAB     CV RIGHT HEART CATH MEASUREMENTS RECORDED N/A 6/30/2021    Procedure: CV RIGHT HEART CATH;  Surgeon: Alexander Cuellar MD;  Location:  HEART CARDIAC CATH LAB     ENT SURGERY  1974    tonsillectomy     ESOPHAGOGASTRODUODENOSCOPY, WITH NASOGASTRIC TUBE INSERTION N/A 7/1/2022    Procedure: ESOPHAGOGASTRODUODENOSCOPY, WITH NASOJEJUNAL TUBE INSERTION;  Surgeon: Ozzy Nickerson MD;  Location:  GI     HAND SURGERY       LEEP TX, CERVICAL  04/07/2017    HCETOR III     LYMPH NODE BIOPSY Left 2005    Left axilla, benign- [place]     THROMBECTOMY UPPER " EXTREMITY Left 2022    Procedure: LEFT RADIAL ARM THROMBECTOMY;  Surgeon: Christie Graham MD;  Location: [facility] OR     TRANSPLANT LUNG RECIPIENT SINGLE X2 Bilateral 2022    Procedure: Clamshell Incision, Bilateral Sequential Lung Transplant, On Cardiopulmonary Bypass, Flexible Bronchoscopy;  Surgeon: Sue Sunshine MD;  Location: [facility] OR       Social History   I have reviewed this patient's social history and updated it with pertinent information if needed.  Social History     Tobacco Use     Smoking status: Former Smoker     Years: 30.00     Types: Cigarettes     Quit date: 2020     Years since quittin.6     Smokeless tobacco: Never Used   Substance Use Topics     Alcohol use: Not Currently     Drug use: Not Currently     Types: Marijuana, Methamphetamines     Comment: hx:marijuana and methamphetamine-quit both unsure ?  2-3 yrs ago       Family History   Unable to obtain due to: patient somnolence    Prior to Admission Medications   Prior to Admission Medications   Prescriptions Last Dose Informant Patient Reported? Taking?   CALCIUM-VITAMIN D PO 2022 at 0930  Yes Yes   Sig: Take 1 tablet by mouth daily   Multiple Vitamin (QUINTABS) TABS 2022 at 0930  Yes Yes   Sig: Take 1 tablet by mouth daily   albuterol (PROAIR HFA/PROVENTIL HFA/VENTOLIN HFA) 108 (90 BASE) MCG/ACT Inhaler 2022  Yes Yes   Sig: Inhale 2 puffs into the lungs every 6 hours as needed for shortness of breath / dyspnea or wheezing   aspirin (ASA) 81 MG chewable tablet 2022  Yes Yes   Sig: Take 81 mg by mouth daily   fluticasone-vilanterol (BREO ELLIPTA) 100-25 MCG/INH inhaler 2022 at 0930  Yes Yes   Sig: Inhale 1 puff into the lungs daily 25 mcg   furosemide (LASIX) 20 MG tablet 2022 at 0930  Yes Yes   Sig: Take 1 tablet (20 mg) by mouth daily   ipratropium - albuterol 0.5 mg/2.5 mg/3 mL (DUONEB) 0.5-2.5 (3) MG/3ML neb solution 2022  Yes Yes   Sig: Inhale 1 vial into the lungs every 4  hours as needed for shortness of breath / dyspnea   umeclidinium (INCRUSE ELLIPTA) 62.5 MCG/INH inhaler 6/28/2022 at 0930  Yes Yes   Sig: Inhale 1 puff into the lungs daily 62.5mcg      Facility-Administered Medications: None     Allergies   No Known Allergies    Physical Exam   Vital Signs: Temp: 98.3  F (36.8  C) Temp src: Axillary BP: 111/51 Pulse: 87   Resp: 12 SpO2: 99 % O2 Device: Nasal cannula Oxygen Delivery: 1 LPM  Weight: 166 lbs 10.68 oz    General: somnolent with eyes closed, responds slowly to questions, does not appear in distress  Neuro:   CV: RRR, no rubs/murmurs.  Pulm: lungs clear to auscultation bilaterally, shallow breathing, unable to take deep breaths, intermittent hyperventilation with pursed lips  Abd: soft, non-distended, non-tender  : producing urine by montgomery catheter  Extremities: 2+ pitting edema to her thighs bilaterally  Skin: dry, no lesions  Psych: intermittent anxiety    Data   I reviewed all medications, new labs and imaging results over the last 24 hours.    Arterial Blood Gases   Recent Labs   Lab 07/13/22  0428 07/12/22  1555 07/12/22  1315 07/12/22  1136   PH 7.36 7.33* 7.25* 7.31*   PCO2 79* 77* 90* 79*   PO2 107* 91 126* 85   HCO3 44* 41* 40* 39*       Complete Blood Count   Recent Labs   Lab 07/13/22  0428 07/12/22  1136 07/12/22  0414 07/11/22  0438 07/10/22  0427   WBC 10.4  --  15.2* 18.4* 17.9*   HGB 7.1* 7.1* 7.2* 7.7* 7.9*     --  286 287 345       Basic Metabolic Panel  Recent Labs   Lab 07/13/22  0918 07/13/22  0434 07/13/22  0428 07/13/22  0100 07/12/22  2055 07/12/22  1555 07/12/22  1552 07/12/22  1136 07/12/22  0419 07/12/22  0414   NA  --   --  139  --   --  136  --  137  --  137   POTASSIUM  --   --  4.5  --   --  4.5  --  4.2  --  4.4   CHLORIDE  --   --  89*  --   --  91*  --  92*  --  93*   CO2  --   --  42*  --   --  38*  --  37*  --  35*   BUN  --   --  150.0*  --   --  134.0*  --  143.0*  --  134.0*   CR  --   --  3.23*  --   --  3.06*  --  3.15*   --  3.11*   * 240* 239* 232*   < > 201*   < > 195*   < > 253*    < > = values in this interval not displayed.     Liver Function Tests  Recent Labs   Lab 07/11/22  0438 07/07/22  0323   AST 16 23   ALT 22 24   ALKPHOS 78 63   BILITOTAL <0.2 <0.2   ALBUMIN 2.7* 2.8*     Pancreatic Enzymes  Recent Labs   Lab 07/08/22  0332   LIPASE 17     Coagulation Profile  No lab results found in last 7 days.    IMAGING:  Recent Results (from the past 24 hour(s))   XR Chest Port 1 View    Narrative    EXAM: XR CHEST PORT 1 VIEW  7/13/2022 6:00 AM     HISTORY:  chest tubes and R hydropneumothorax s/p bilateral lung  transplant       COMPARISON:  Chest x-ray 7/12/2022    FINDINGS: AP radiograph of the chest. Postoperative changes of  bilateral lung transplant with intact median clamshell sternotomy  wires. Right IJ sheath with tip at the innominate confluence. Right  arm midline catheter with tip overlying the right axilla. Stable  positioning of bibasilar chest tubes and mediastinal drain.    Stable cardiomediastinal silhouette. Atherosclerotic calcifications of  the aortic arch. Improved aeration of the left lung base. Small left  pleural effusion. No appreciable pneumothorax. Continued dense  retrocardiac and patchy left perihilar airspace opacities. Decreased  subpleural opacities at the lung apices.      Impression    IMPRESSION:   1. Improved aeration to the left lung base.  2. Decreased left pleural effusion.  3. Decreased subpleural apical opacities.  4. Unchanged retrocardiac and perihilar opacities, likely pulmonary  edema and/or atelectasis.    I have personally reviewed the examination and initial interpretation  and I agree with the findings.    YANNICK BENAVIDEZ MD         SYSTEM ID:  [id number]

## 2022-07-13 NOTE — PROGRESS NOTES
"Cardiothoracic Surgery Progress Note    Subjective: No acute events. UOP ~1L with Lasix challenge.     Objective:   /54   Pulse 85   Temp 98.3  F (36.8  C) (Axillary)   Resp 14   Ht 1.575 m (5' 2\")   Wt 75.6 kg (166 lb 10.7 oz)   SpO2 96%   BMI 30.48 kg/m      PE:  General: spine in bed, follows commands  Neuro: grossly intact, A&Ox3, conversational, appropriate  Resp: on 1L NC  CV: RRR   Abdomen: Soft, mild tenderness on  palpation, non-peritonitic   Incisions: c/d/i  Extremities: warm and well perfused      Intake/Output Summary (Last 24 hours) at 7/13/2022 0946  Last data filed at 7/13/2022 0800  Gross per 24 hour   Intake 1325.42 ml   Output 1982 ml   Net -656.58 ml       Labs reviewed  Recent Labs   Lab 07/12/22  1555 07/12/22  1315 07/12/22  1136 07/12/22  0414   PH 7.33* 7.25* 7.31* 7.24*   PCO2 77* 90* 79* 87*   PO2 91 126* 85 132*   HCO3 41* 40* 39* 38*      Complete Blood Count           Recent Labs   Lab 07/13/22  0428 07/12/22  1136 07/12/22  0414 07/11/22  0438 07/10/22  0427   WBC 10.4  --  15.2* 18.4* 17.9*   HGB 7.1* 7.1* 7.2* 7.7* 7.9*     --  286 287 345      Basic Metabolic Panel              Recent Labs   Lab 07/13/22  0434 07/13/22  0428 07/13/22  0100 07/12/22  2055 07/12/22  1555 07/12/22  1552 07/12/22  1136 07/12/22  0419 07/12/22  0414   NA  --  139  --   --  136  --  137  --  137   POTASSIUM  --  4.5  --   --  4.5  --  4.2  --  4.4   CHLORIDE  --  89*  --   --  91*  --  92*  --  93*   CO2  --  42*  --   --  38*  --  37*  --  35*   BUN  --  150.0*  --   --  134.0*  --  143.0*  --  134.0*   CR  --  3.23*  --   --  3.06*  --  3.15*  --  3.11*   * 239* 232* 167* 201*   < > 195*   < > 253*    < > = values in this interval not displayed.      Liver Function Tests       Recent Labs   Lab 07/11/22  0438 07/07/22  0323   AST 16 23   ALT 22 24   ALKPHOS 78 63   BILITOTAL <0.2 <0.2   ALBUMIN 2.7* 2.8*      Pancreatic Enzymes      Recent Labs   Lab 07/08/22  0332   LIPASE 17 "        Imaging reviewed  CXR  IMPRESSION:   1. Improved aeration to the left lung base.  2. Decreased left pleural effusion.  3. Decreased subpleural apical opacities.  4. Unchanged retrocardiac and perihilar opacities, likely pulmonary  edema and/or atelectasis.    A/P: 62F PMH of oxygen-dependent COPD, HFpEF, HTN, HCV, and osteopenia who underwent bilateral lung transplant on 6/28/22 with Dr. Sunshine. This was complicated by left upper extremity acute limb ischemia s/p left radial thrombectomy on 7/1/22. She remains critically ill with worsening renal injury.     - Continue BiPAP as tolerated  - Monitor chest tube output  - CXR daily    Seen and discussed with CV fellow and CV staff.    Vic Montelongo MD  Surgery PGY-3

## 2022-07-13 NOTE — PROGRESS NOTES
Pt refused BiPAP for bedtime use. Educated on importance of wearing device and medical team encouraging her to wear. Pt still declines.     RN's notified of findings. Please call RT if pt changes mind.     Nay Simon, DANIELT, BSRT-RRT

## 2022-07-13 NOTE — PROGRESS NOTES
ICU End of Shift Summary. See flowsheets for vital signs and detailed assessment.    Changes this shift: A/Ox4, anxious about wearing BiPAP at HS. PRN oxy, atarax, Zofran, compazine given throughout shift for patient comfort. Tolerated BiPAP @ 30% FiO2 for several hours. Had ~20 sec SVT run w/ hypotension @ 2200, provider updated. No repeat events noted. Left chest tubes having no output overnight. Rectal tube & montgomery remain in place.     Plan: Continue to encourage BiPAP use, manage nausea/anxiety.

## 2022-07-13 NOTE — PROGRESS NOTES
CLINICAL NUTRITION SERVICES - REASSESSMENT NOTE     Nutrition Prescription    RECOMMENDATIONS FOR MDs/PROVIDERS TO ORDER:  Daily fluid adjustments per MD    Malnutrition Status:    Patient does not meet two of the established criteria necessary for diagnosing malnutrition    Recommendations already ordered by Registered Dietitian (RD):  -Continue Novasource Renal @ 35 ml/hr (840 ml) + 1 pkt Prosource daily provides 1720 kcal (30 kcal/kg), 87 g pro (1.5 g/kg), 154 g CHO, 602 ml free water, and 0 g fiber daily  -Switch liquid MVI to tab to help decrease stool output.     Future/Additional Recommendations:  -Continue to monitor stool output  -Continue to monitor TF tolerance, weight trends, labs  -Monitor renal fx and possible initiation of CRRT     EVALUATION OF THE PROGRESS TOWARD GOALS   Diet: NPO  Nutrition Support: Pt tolerating TF at goal rate.   7/2-7/5: Osmolite 1.5 Rayshawn @ goal of 45ml/hr (1080ml/day) + 1 pkt Prosource daily will provide: 1660 kcals (30 kcal/kg), 78 g PRO (1.4 g/kg), 822 ml free H20, 219 g CHO, and 0 g fiber daily.     7/5-current: Novasource Renal @ 35 ml/hr (840 ml) + 1 pkt Prosource daily provides 1720 kcal (30 kcal/kg), 87 g pro (1.5 g/kg), 154 g CHO, 602 ml free water, and 0 g fiber daily    Pt received an average of 596 ml TF/d over the past 7 days + an average of 1 pkt Prosource daily. This provided an average of 1232 kcal/d (22 kcal/kg) and 65 g protein/d (1.2 g/kg). This met 88% estimated energy needs and 87% estimated protein needs.      NEW FINDINGS   Pt remains critically ill with worsening renal injury. Pt anxious about wearing bipap.     GI: Loose/watery stools. C.diff+ on 7/11. Stool output has somewhat decreased, however pt continues with abdominal pain.     Labs: Reviewed - hyperphosphatemia, hypermagnemia, elevated BG    Medications: Reviewed    Weights: No weight loss over the past week.  07/13/22 0400 75.6 kg (166 lb 10.7 oz)   07/11/22 0400 74.2 kg (163 lb 9.3 oz)   07/10/22  0600 76.7 kg (169 lb 1.5 oz)   07/09/22 0000 74.8 kg (164 lb 14.5 oz)   07/08/22 0358 76.2 kg (167 lb 15.9 oz)   07/07/22 0100 74.8 kg (164 lb 14.5 oz)   07/06/22 0000 72.9 kg (160 lb 11.2 oz)     MALNUTRITION  % Intake: Decreased intake does not meet criteria  % Weight Loss: None noted  Subcutaneous Fat Loss: None observed  Muscle Loss: Temporal:  Moderate, Thoracic region (clavicle, acromium bone, deltoid, trapezius, pectoral):  Mild and Upper leg (quadricep, hamstring):  Mild  Fluid Accumulation/Edema: Does not meet criteria (trace)  Malnutrition Diagnosis: Patient does not meet two of the established criteria necessary for diagnosing malnutrition    Previous Goals   Total avg nutritional intake to meet a minimum of 25 kcal/kg and 1.3 g PRO/kg daily (per dosing wt 56 kg).  Evaluation: Not met    Previous Nutrition Diagnosis  Inadequate oral intake related to poor appetite as evidenced by need for EN to meet 100% nutrition needs  Evaluation: No longer applicable, nutrition diagnosis changed below    CURRENT NUTRITION DIAGNOSIS  Inadequate oral intake related to NPO status as evidenced by need for EN to meet 100% nutrition needs.       INTERVENTIONS  Implementation  Enteral Nutrition - continue as ordered   Multi-trace element supplement therapy    Goals  Total avg nutritional intake to meet a minimum of 25 kcal/kg and 1.3 g PRO/kg daily (per dosing wt 56 kg).    Monitoring/Evaluation  Progress toward goals will be monitored and evaluated per protocol.    Julia Alva, MS, RD, LD  4E (ICU) RD pager: [phone]  Ascom: [id number]  Weekend/Holiday RD pager: [phone]

## 2022-07-13 NOTE — PLAN OF CARE
Goal Outcome Evaluation:    Plan of Care Reviewed With: patient     Overall Patient Progress: improving    Outcome Evaluation: continue TFs as ordered

## 2022-07-14 ENCOUNTER — APPOINTMENT (OUTPATIENT)
Dept: GENERAL RADIOLOGY | Facility: CLINIC | Age: 62
DRG: 007 | End: 2022-07-14
Attending: INTERNAL MEDICINE
Payer: MEDICARE

## 2022-07-14 ENCOUNTER — APPOINTMENT (OUTPATIENT)
Dept: GENERAL RADIOLOGY | Facility: CLINIC | Age: 62
DRG: 007 | End: 2022-07-14
Attending: THORACIC SURGERY (CARDIOTHORACIC VASCULAR SURGERY)
Payer: MEDICARE

## 2022-07-14 ENCOUNTER — APPOINTMENT (OUTPATIENT)
Dept: CT IMAGING | Facility: CLINIC | Age: 62
DRG: 007 | End: 2022-07-14
Attending: NURSE PRACTITIONER
Payer: MEDICARE

## 2022-07-14 ENCOUNTER — APPOINTMENT (OUTPATIENT)
Dept: GENERAL RADIOLOGY | Facility: CLINIC | Age: 62
DRG: 007 | End: 2022-07-14
Attending: NURSE PRACTITIONER
Payer: MEDICARE

## 2022-07-14 LAB
ABO/RH(D): NORMAL
ALBUMIN SERPL BCG-MCNC: 2.6 G/DL (ref 3.5–5.2)
ALP SERPL-CCNC: 64 U/L (ref 35–104)
ALT SERPL W P-5'-P-CCNC: 22 U/L (ref 10–35)
AMMONIA PLAS-SCNC: 15 UMOL/L (ref 11–51)
ANION GAP SERPL CALCULATED.3IONS-SCNC: 20 MMOL/L (ref 7–15)
ANION GAP SERPL CALCULATED.3IONS-SCNC: 6 MMOL/L (ref 7–15)
ANION GAP SERPL CALCULATED.3IONS-SCNC: 6 MMOL/L (ref 7–15)
ANTIBODY SCREEN: NEGATIVE
AST SERPL W P-5'-P-CCNC: 22 U/L (ref 10–35)
BASE EXCESS BLDV CALC-SCNC: 10.7 MMOL/L (ref -7.7–1.9)
BASE EXCESS BLDV CALC-SCNC: 18.6 MMOL/L (ref -7.7–1.9)
BASOPHILS # BLD MANUAL: 0 10E3/UL (ref 0–0.2)
BASOPHILS NFR BLD MANUAL: 0 %
BILIRUB DIRECT SERPL-MCNC: <0.2 MG/DL (ref 0–0.3)
BILIRUB SERPL-MCNC: 0.2 MG/DL
BLD PROD TYP BPU: NORMAL
BLOOD COMPONENT TYPE: NORMAL
BUN SERPL-MCNC: 110 MG/DL (ref 8–23)
BUN SERPL-MCNC: 119 MG/DL (ref 8–23)
BUN SERPL-MCNC: 155 MG/DL (ref 8–23)
CALCIUM SERPL-MCNC: 8.5 MG/DL (ref 8.8–10.2)
CALCIUM SERPL-MCNC: 8.6 MG/DL (ref 8.8–10.2)
CALCIUM SERPL-MCNC: 8.8 MG/DL (ref 8.8–10.2)
CHLORIDE SERPL-SCNC: 92 MMOL/L (ref 98–107)
CHLORIDE SERPL-SCNC: 92 MMOL/L (ref 98–107)
CHLORIDE SERPL-SCNC: 97 MMOL/L (ref 98–107)
CODING SYSTEM: NORMAL
CREAT SERPL-MCNC: 2.35 MG/DL (ref 0.51–0.95)
CREAT SERPL-MCNC: 2.66 MG/DL (ref 0.51–0.95)
CREAT SERPL-MCNC: 3 MG/DL (ref 0.51–0.95)
CROSSMATCH: NORMAL
DEPRECATED HCO3 PLAS-SCNC: 24 MMOL/L (ref 22–29)
DEPRECATED HCO3 PLAS-SCNC: 36 MMOL/L (ref 22–29)
DEPRECATED HCO3 PLAS-SCNC: 42 MMOL/L (ref 22–29)
EOSINOPHIL # BLD MANUAL: 0 10E3/UL (ref 0–0.7)
EOSINOPHIL NFR BLD MANUAL: 0 %
ERYTHROCYTE [DISTWIDTH] IN BLOOD BY AUTOMATED COUNT: 16.4 % (ref 10–15)
ERYTHROCYTE [DISTWIDTH] IN BLOOD BY AUTOMATED COUNT: 17 % (ref 10–15)
GFR SERPL CREATININE-BSD FRML MDRD: 17 ML/MIN/1.73M2
GFR SERPL CREATININE-BSD FRML MDRD: 20 ML/MIN/1.73M2
GFR SERPL CREATININE-BSD FRML MDRD: 23 ML/MIN/1.73M2
GLUCOSE BLDC GLUCOMTR-MCNC: 166 MG/DL (ref 70–99)
GLUCOSE BLDC GLUCOMTR-MCNC: 186 MG/DL (ref 70–99)
GLUCOSE BLDC GLUCOMTR-MCNC: 200 MG/DL (ref 70–99)
GLUCOSE BLDC GLUCOMTR-MCNC: 212 MG/DL (ref 70–99)
GLUCOSE BLDC GLUCOMTR-MCNC: 215 MG/DL (ref 70–99)
GLUCOSE BLDC GLUCOMTR-MCNC: 227 MG/DL (ref 70–99)
GLUCOSE BLDC GLUCOMTR-MCNC: 234 MG/DL (ref 70–99)
GLUCOSE SERPL-MCNC: 193 MG/DL (ref 70–99)
GLUCOSE SERPL-MCNC: 207 MG/DL (ref 70–99)
GLUCOSE SERPL-MCNC: 215 MG/DL (ref 70–99)
HCO3 BLDV-SCNC: 35 MMOL/L (ref 21–28)
HCO3 BLDV-SCNC: 46 MMOL/L (ref 21–28)
HCT VFR BLD AUTO: 22.4 % (ref 35–47)
HCT VFR BLD AUTO: 30.6 % (ref 35–47)
HGB BLD-MCNC: 6.8 G/DL (ref 11.7–15.7)
HGB BLD-MCNC: 9.4 G/DL (ref 11.7–15.7)
ISSUE DATE AND TIME: NORMAL
LYMPHOCYTES # BLD MANUAL: 0.2 10E3/UL (ref 0.8–5.3)
LYMPHOCYTES NFR BLD MANUAL: 2 %
MAGNESIUM SERPL-MCNC: 2.5 MG/DL (ref 1.7–2.3)
MAGNESIUM SERPL-MCNC: 2.8 MG/DL (ref 1.7–2.3)
MAGNESIUM SERPL-MCNC: 3.1 MG/DL (ref 1.7–2.3)
MCH RBC QN AUTO: 30.9 PG (ref 26.5–33)
MCH RBC QN AUTO: 31.1 PG (ref 26.5–33)
MCHC RBC AUTO-ENTMCNC: 30.4 G/DL (ref 31.5–36.5)
MCHC RBC AUTO-ENTMCNC: 30.7 G/DL (ref 31.5–36.5)
MCV RBC AUTO: 101 FL (ref 78–100)
MCV RBC AUTO: 102 FL (ref 78–100)
MONOCYTES # BLD MANUAL: 0.7 10E3/UL (ref 0–1.3)
MONOCYTES NFR BLD MANUAL: 7 %
MYELOCYTES # BLD MANUAL: 0.1 10E3/UL
MYELOCYTES NFR BLD MANUAL: 1 %
NEUTROPHILS # BLD MANUAL: 9.5 10E3/UL (ref 1.6–8.3)
NEUTROPHILS NFR BLD MANUAL: 90 %
NRBC # BLD AUTO: 0.1 10E3/UL
NRBC BLD MANUAL-RTO: 1 %
O2/TOTAL GAS SETTING VFR VENT: 0 %
O2/TOTAL GAS SETTING VFR VENT: 20 %
OXYHGB MFR BLDV: 85 % (ref 70–75)
OXYHGB MFR BLDV: 98 % (ref 70–75)
PATH REPORT.COMMENTS IMP SPEC: NORMAL
PATH REPORT.FINAL DX SPEC: NORMAL
PATH REPORT.GROSS SPEC: NORMAL
PATH REPORT.MICROSCOPIC SPEC OTHER STN: NORMAL
PATH REPORT.RELEVANT HX SPEC: NORMAL
PCO2 BLDV: 46 MM HG (ref 40–50)
PCO2 BLDV: 85 MM HG (ref 40–50)
PH BLDV: 7.34 [PH] (ref 7.32–7.43)
PH BLDV: 7.49 [PH] (ref 7.32–7.43)
PHOSPHATE SERPL-MCNC: 5.1 MG/DL (ref 2.5–4.5)
PLAT MORPH BLD: ABNORMAL
PLATELET # BLD AUTO: 274 10E3/UL (ref 150–450)
PLATELET # BLD AUTO: 295 10E3/UL (ref 150–450)
PO2 BLDV: 119 MM HG (ref 25–47)
PO2 BLDV: 52 MM HG (ref 25–47)
POLYCHROMASIA BLD QL SMEAR: SLIGHT
POTASSIUM SERPL-SCNC: 4.3 MMOL/L (ref 3.4–5.3)
POTASSIUM SERPL-SCNC: 4.4 MMOL/L (ref 3.4–5.3)
POTASSIUM SERPL-SCNC: 5.1 MMOL/L (ref 3.4–5.3)
PROT SERPL-MCNC: 4.6 G/DL (ref 6.4–8.3)
RBC # BLD AUTO: 2.19 10E6/UL (ref 3.8–5.2)
RBC # BLD AUTO: 3.04 10E6/UL (ref 3.8–5.2)
RBC MORPH BLD: ABNORMAL
SARS-COV-2 RNA RESP QL NAA+PROBE: NEGATIVE
SODIUM SERPL-SCNC: 134 MMOL/L (ref 136–145)
SODIUM SERPL-SCNC: 140 MMOL/L (ref 136–145)
SODIUM SERPL-SCNC: 141 MMOL/L (ref 136–145)
SPECIMEN EXPIRATION DATE: NORMAL
TACROLIMUS BLD-MCNC: 8.3 UG/L (ref 5–15)
TME LAST DOSE: NORMAL H
TME LAST DOSE: NORMAL H
TROPONIN T SERPL HS-MCNC: 501 NG/L
TROPONIN T SERPL HS-MCNC: 540 NG/L
UNIT ABO/RH: NORMAL
UNIT NUMBER: NORMAL
UNIT STATUS: NORMAL
UNIT TYPE ISBT: 5100
WBC # BLD AUTO: 10.3 10E3/UL (ref 4–11)
WBC # BLD AUTO: 10.5 10E3/UL (ref 4–11)

## 2022-07-14 PROCEDURE — 999N000065 XR CHEST PORT 1 VIEW

## 2022-07-14 PROCEDURE — 250N000013 HC RX MED GY IP 250 OP 250 PS 637: Performed by: STUDENT IN AN ORGANIZED HEALTH CARE EDUCATION/TRAINING PROGRAM

## 2022-07-14 PROCEDURE — 250N000013 HC RX MED GY IP 250 OP 250 PS 637

## 2022-07-14 PROCEDURE — 250N000013 HC RX MED GY IP 250 OP 250 PS 637: Performed by: SURGERY

## 2022-07-14 PROCEDURE — 250N000011 HC RX IP 250 OP 636: Performed by: SURGERY

## 2022-07-14 PROCEDURE — 94640 AIRWAY INHALATION TREATMENT: CPT

## 2022-07-14 PROCEDURE — 999N000157 HC STATISTIC RCP TIME EA 10 MIN

## 2022-07-14 PROCEDURE — 250N000013 HC RX MED GY IP 250 OP 250 PS 637: Performed by: NURSE PRACTITIONER

## 2022-07-14 PROCEDURE — 71045 X-RAY EXAM CHEST 1 VIEW: CPT | Mod: 26 | Performed by: RADIOLOGY

## 2022-07-14 PROCEDURE — 71045 X-RAY EXAM CHEST 1 VIEW: CPT | Mod: 77

## 2022-07-14 PROCEDURE — 88108 CYTOPATH CONCENTRATE TECH: CPT | Mod: 26 | Performed by: PATHOLOGY

## 2022-07-14 PROCEDURE — 83735 ASSAY OF MAGNESIUM: CPT

## 2022-07-14 PROCEDURE — 71250 CT THORAX DX C-: CPT | Mod: 26 | Performed by: RADIOLOGY

## 2022-07-14 PROCEDURE — 90937 HEMODIALYSIS REPEATED EVAL: CPT

## 2022-07-14 PROCEDURE — 85007 BL SMEAR W/DIFF WBC COUNT: CPT | Performed by: SURGERY

## 2022-07-14 PROCEDURE — 200N000002 HC R&B ICU UMMC

## 2022-07-14 PROCEDURE — 02HV33Z INSERTION OF INFUSION DEVICE INTO SUPERIOR VENA CAVA, PERCUTANEOUS APPROACH: ICD-10-PCS | Performed by: STUDENT IN AN ORGANIZED HEALTH CARE EDUCATION/TRAINING PROGRAM

## 2022-07-14 PROCEDURE — 82805 BLOOD GASES W/O2 SATURATION: CPT | Performed by: STUDENT IN AN ORGANIZED HEALTH CARE EDUCATION/TRAINING PROGRAM

## 2022-07-14 PROCEDURE — 80048 BASIC METABOLIC PNL TOTAL CA: CPT | Performed by: STUDENT IN AN ORGANIZED HEALTH CARE EDUCATION/TRAINING PROGRAM

## 2022-07-14 PROCEDURE — 83519 RIA NONANTIBODY: CPT

## 2022-07-14 PROCEDURE — 82805 BLOOD GASES W/O2 SATURATION: CPT

## 2022-07-14 PROCEDURE — 94660 CPAP INITIATION&MGMT: CPT

## 2022-07-14 PROCEDURE — 80197 ASSAY OF TACROLIMUS: CPT | Performed by: PHYSICIAN ASSISTANT

## 2022-07-14 PROCEDURE — 76000 FLUOROSCOPY <1 HR PHYS/QHP: CPT

## 2022-07-14 PROCEDURE — 250N000012 HC RX MED GY IP 250 OP 636 PS 637: Performed by: PHYSICIAN ASSISTANT

## 2022-07-14 PROCEDURE — 999N000128 HC STATISTIC PERIPHERAL IV START W/O US GUIDANCE

## 2022-07-14 PROCEDURE — 84484 ASSAY OF TROPONIN QUANT: CPT | Performed by: STUDENT IN AN ORGANIZED HEALTH CARE EDUCATION/TRAINING PROGRAM

## 2022-07-14 PROCEDURE — 36415 COLL VENOUS BLD VENIPUNCTURE: CPT | Performed by: STUDENT IN AN ORGANIZED HEALTH CARE EDUCATION/TRAINING PROGRAM

## 2022-07-14 PROCEDURE — 93010 ELECTROCARDIOGRAM REPORT: CPT | Mod: 76 | Performed by: INTERNAL MEDICINE

## 2022-07-14 PROCEDURE — 83735 ASSAY OF MAGNESIUM: CPT | Performed by: STUDENT IN AN ORGANIZED HEALTH CARE EDUCATION/TRAINING PROGRAM

## 2022-07-14 PROCEDURE — 84100 ASSAY OF PHOSPHORUS: CPT | Performed by: SURGERY

## 2022-07-14 PROCEDURE — 99233 SBSQ HOSP IP/OBS HIGH 50: CPT | Mod: 24 | Performed by: CLINICAL NURSE SPECIALIST

## 2022-07-14 PROCEDURE — 250N000011 HC RX IP 250 OP 636

## 2022-07-14 PROCEDURE — 36556 INSERT NON-TUNNEL CV CATH: CPT | Mod: GC | Performed by: STUDENT IN AN ORGANIZED HEALTH CARE EDUCATION/TRAINING PROGRAM

## 2022-07-14 PROCEDURE — 71045 X-RAY EXAM CHEST 1 VIEW: CPT

## 2022-07-14 PROCEDURE — 83735 ASSAY OF MAGNESIUM: CPT | Performed by: SURGERY

## 2022-07-14 PROCEDURE — 71250 CT THORAX DX C-: CPT | Mod: MG

## 2022-07-14 PROCEDURE — P9016 RBC LEUKOCYTES REDUCED: HCPCS | Performed by: STUDENT IN AN ORGANIZED HEALTH CARE EDUCATION/TRAINING PROGRAM

## 2022-07-14 PROCEDURE — 250N000013 HC RX MED GY IP 250 OP 250 PS 637: Performed by: THORACIC SURGERY (CARDIOTHORACIC VASCULAR SURGERY)

## 2022-07-14 PROCEDURE — 36415 COLL VENOUS BLD VENIPUNCTURE: CPT

## 2022-07-14 PROCEDURE — U0005 INFEC AGEN DETEC AMPLI PROBE: HCPCS | Performed by: NURSE PRACTITIONER

## 2022-07-14 PROCEDURE — G1010 CDSM STANSON: HCPCS | Mod: GC | Performed by: RADIOLOGY

## 2022-07-14 PROCEDURE — 82140 ASSAY OF AMMONIA: CPT | Performed by: SURGERY

## 2022-07-14 PROCEDURE — 250N000012 HC RX MED GY IP 250 OP 636 PS 637: Performed by: THORACIC SURGERY (CARDIOTHORACIC VASCULAR SURGERY)

## 2022-07-14 PROCEDURE — 85027 COMPLETE CBC AUTOMATED: CPT | Performed by: SURGERY

## 2022-07-14 PROCEDURE — 5A1D70Z PERFORMANCE OF URINARY FILTRATION, INTERMITTENT, LESS THAN 6 HOURS PER DAY: ICD-10-PCS | Performed by: STUDENT IN AN ORGANIZED HEALTH CARE EDUCATION/TRAINING PROGRAM

## 2022-07-14 PROCEDURE — 94640 AIRWAY INHALATION TREATMENT: CPT | Mod: 76

## 2022-07-14 PROCEDURE — 82248 BILIRUBIN DIRECT: CPT | Performed by: SURGERY

## 2022-07-14 PROCEDURE — 258N000001 HC RX 258: Performed by: SURGERY

## 2022-07-14 PROCEDURE — 250N000012 HC RX MED GY IP 250 OP 636 PS 637: Performed by: SURGERY

## 2022-07-14 PROCEDURE — 85027 COMPLETE CBC AUTOMATED: CPT | Performed by: STUDENT IN AN ORGANIZED HEALTH CARE EDUCATION/TRAINING PROGRAM

## 2022-07-14 PROCEDURE — 88312 SPECIAL STAINS GROUP 1: CPT | Mod: 26 | Performed by: PATHOLOGY

## 2022-07-14 PROCEDURE — 94150 VITAL CAPACITY TEST: CPT

## 2022-07-14 PROCEDURE — 86923 COMPATIBILITY TEST ELECTRIC: CPT | Performed by: STUDENT IN AN ORGANIZED HEALTH CARE EDUCATION/TRAINING PROGRAM

## 2022-07-14 PROCEDURE — 99291 CRITICAL CARE FIRST HOUR: CPT | Mod: 25 | Performed by: STUDENT IN AN ORGANIZED HEALTH CARE EDUCATION/TRAINING PROGRAM

## 2022-07-14 PROCEDURE — 258N000003 HC RX IP 258 OP 636: Performed by: CLINICAL NURSE SPECIALIST

## 2022-07-14 PROCEDURE — 250N000009 HC RX 250: Performed by: SURGERY

## 2022-07-14 PROCEDURE — 86901 BLOOD TYPING SEROLOGIC RH(D): CPT

## 2022-07-14 PROCEDURE — 99233 SBSQ HOSP IP/OBS HIGH 50: CPT | Mod: 24 | Performed by: NURSE PRACTITIONER

## 2022-07-14 PROCEDURE — 99207 PR NO BILLABLE SERVICE THIS VISIT: CPT | Performed by: NURSE PRACTITIONER

## 2022-07-14 RX ORDER — HYDROMORPHONE HCL IN WATER/PF 6 MG/30 ML
0.2 PATIENT CONTROLLED ANALGESIA SYRINGE INTRAVENOUS
Status: COMPLETED | OUTPATIENT
Start: 2022-07-14 | End: 2022-07-15

## 2022-07-14 RX ORDER — ACETAMINOPHEN 325 MG/1
975 TABLET ORAL 3 TIMES DAILY
Status: DISCONTINUED | OUTPATIENT
Start: 2022-07-14 | End: 2022-08-05

## 2022-07-14 RX ADMIN — SODIUM CHLORIDE 250 ML: 9 INJECTION, SOLUTION INTRAVENOUS at 14:50

## 2022-07-14 RX ADMIN — LEVALBUTEROL HYDROCHLORIDE 1.25 MG: 1.25 SOLUTION RESPIRATORY (INHALATION) at 16:02

## 2022-07-14 RX ADMIN — PREDNISONE 15 MG: 5 TABLET ORAL at 08:13

## 2022-07-14 RX ADMIN — NYSTATIN 1000000 UNITS: 100000 SUSPENSION ORAL at 12:33

## 2022-07-14 RX ADMIN — ONDANSETRON 4 MG: 2 INJECTION INTRAMUSCULAR; INTRAVENOUS at 07:54

## 2022-07-14 RX ADMIN — ACETYLCYSTEINE 2 ML: 200 SOLUTION ORAL; RESPIRATORY (INHALATION) at 19:26

## 2022-07-14 RX ADMIN — TACROLIMUS 5 MG: 5 CAPSULE ORAL at 08:12

## 2022-07-14 RX ADMIN — ACETAMINOPHEN 975 MG: 325 TABLET, FILM COATED ORAL at 20:08

## 2022-07-14 RX ADMIN — ACETAMINOPHEN 975 MG: 325 TABLET, FILM COATED ORAL at 05:08

## 2022-07-14 RX ADMIN — TACROLIMUS 5 MG: 5 CAPSULE ORAL at 18:59

## 2022-07-14 RX ADMIN — INSULIN ASPART 4 UNITS: 100 INJECTION, SOLUTION INTRAVENOUS; SUBCUTANEOUS at 20:24

## 2022-07-14 RX ADMIN — ACETYLCYSTEINE 2 ML: 200 SOLUTION ORAL; RESPIRATORY (INHALATION) at 12:53

## 2022-07-14 RX ADMIN — INSULIN ASPART 2 UNITS: 100 INJECTION, SOLUTION INTRAVENOUS; SUBCUTANEOUS at 12:34

## 2022-07-14 RX ADMIN — VANCOMYCIN HYDROCHLORIDE 125 MG: KIT at 18:59

## 2022-07-14 RX ADMIN — MYCOPHENOLATE MOFETIL 1000 MG: 200 POWDER, FOR SUSPENSION ORAL at 20:08

## 2022-07-14 RX ADMIN — Medication 1 PACKET: at 08:15

## 2022-07-14 RX ADMIN — MIDODRINE HYDROCHLORIDE 20 MG: 5 TABLET ORAL at 08:13

## 2022-07-14 RX ADMIN — ACETYLCYSTEINE 2 ML: 200 SOLUTION ORAL; RESPIRATORY (INHALATION) at 08:25

## 2022-07-14 RX ADMIN — VANCOMYCIN HYDROCHLORIDE 125 MG: KIT at 05:27

## 2022-07-14 RX ADMIN — HEPARIN SODIUM 5000 UNITS: 5000 INJECTION, SOLUTION INTRAVENOUS; SUBCUTANEOUS at 22:10

## 2022-07-14 RX ADMIN — NYSTATIN 1000000 UNITS: 100000 SUSPENSION ORAL at 15:33

## 2022-07-14 RX ADMIN — DEXTROSE MONOHYDRATE 1000 ML: 100 INJECTION, SOLUTION INTRAVENOUS at 02:29

## 2022-07-14 RX ADMIN — HEPARIN SODIUM 5000 UNITS: 5000 INJECTION, SOLUTION INTRAVENOUS; SUBCUTANEOUS at 15:32

## 2022-07-14 RX ADMIN — Medication 1 CAPSULE: at 20:07

## 2022-07-14 RX ADMIN — THERA TABS 1 TABLET: TAB at 08:13

## 2022-07-14 RX ADMIN — LEVALBUTEROL HYDROCHLORIDE 1.25 MG: 1.25 SOLUTION RESPIRATORY (INHALATION) at 08:25

## 2022-07-14 RX ADMIN — NYSTATIN 1000000 UNITS: 100000 SUSPENSION ORAL at 20:08

## 2022-07-14 RX ADMIN — VANCOMYCIN HYDROCHLORIDE 125 MG: KIT at 12:34

## 2022-07-14 RX ADMIN — LEVALBUTEROL HYDROCHLORIDE 1.25 MG: 1.25 SOLUTION RESPIRATORY (INHALATION) at 19:26

## 2022-07-14 RX ADMIN — THIAMINE HCL TAB 100 MG 100 MG: 100 TAB at 08:13

## 2022-07-14 RX ADMIN — OXYCODONE HYDROCHLORIDE 2.5 MG: 5 TABLET ORAL at 10:27

## 2022-07-14 RX ADMIN — NYSTATIN: 100000 CREAM TOPICAL at 08:14

## 2022-07-14 RX ADMIN — ASPIRIN 81 MG CHEWABLE TABLET 81 MG: 81 TABLET CHEWABLE at 08:13

## 2022-07-14 RX ADMIN — INSULIN ASPART 4 UNITS: 100 INJECTION, SOLUTION INTRAVENOUS; SUBCUTANEOUS at 01:01

## 2022-07-14 RX ADMIN — NYSTATIN: 100000 CREAM TOPICAL at 20:25

## 2022-07-14 RX ADMIN — NYSTATIN 1000000 UNITS: 100000 SUSPENSION ORAL at 08:12

## 2022-07-14 RX ADMIN — INSULIN ASPART 3 UNITS: 100 INJECTION, SOLUTION INTRAVENOUS; SUBCUTANEOUS at 05:13

## 2022-07-14 RX ADMIN — SODIUM CHLORIDE 300 ML: 9 INJECTION, SOLUTION INTRAVENOUS at 14:40

## 2022-07-14 RX ADMIN — INSULIN ASPART 3 UNITS: 100 INJECTION, SOLUTION INTRAVENOUS; SUBCUTANEOUS at 23:57

## 2022-07-14 RX ADMIN — ACETAMINOPHEN 975 MG: 325 TABLET, FILM COATED ORAL at 15:32

## 2022-07-14 RX ADMIN — CALCIUM CARBONATE 600 MG (1,500 MG)-VITAMIN D3 400 UNIT TABLET 1 TABLET: at 08:13

## 2022-07-14 RX ADMIN — VALGANCICLOVIR HYDROCHLORIDE 450 MG: 50 POWDER, FOR SOLUTION ORAL at 08:14

## 2022-07-14 RX ADMIN — LEVALBUTEROL HYDROCHLORIDE 1.25 MG: 1.25 SOLUTION RESPIRATORY (INHALATION) at 12:53

## 2022-07-14 RX ADMIN — PROCHLORPERAZINE EDISYLATE 10 MG: 5 INJECTION INTRAMUSCULAR; INTRAVENOUS at 00:49

## 2022-07-14 RX ADMIN — MYCOPHENOLATE MOFETIL 1000 MG: 200 POWDER, FOR SUSPENSION ORAL at 08:12

## 2022-07-14 RX ADMIN — MIDODRINE HYDROCHLORIDE 20 MG: 5 TABLET ORAL at 00:55

## 2022-07-14 RX ADMIN — HYDROXYZINE HYDROCHLORIDE 25 MG: 25 TABLET ORAL at 08:13

## 2022-07-14 RX ADMIN — PREDNISONE 15 MG: 5 TABLET ORAL at 20:07

## 2022-07-14 RX ADMIN — VANCOMYCIN HYDROCHLORIDE 125 MG: KIT at 00:54

## 2022-07-14 RX ADMIN — LIDOCAINE PATCH 4% 2 PATCH: 40 PATCH TOPICAL at 10:28

## 2022-07-14 RX ADMIN — MIDODRINE HYDROCHLORIDE 20 MG: 5 TABLET ORAL at 15:33

## 2022-07-14 RX ADMIN — HEPARIN SODIUM 5000 UNITS: 5000 INJECTION, SOLUTION INTRAVENOUS; SUBCUTANEOUS at 05:14

## 2022-07-14 RX ADMIN — CALCIUM CARBONATE 600 MG (1,500 MG)-VITAMIN D3 400 UNIT TABLET 1 TABLET: at 20:08

## 2022-07-14 RX ADMIN — INSULIN ASPART 4 UNITS: 100 INJECTION, SOLUTION INTRAVENOUS; SUBCUTANEOUS at 15:47

## 2022-07-14 RX ADMIN — Medication 40 MG: at 08:14

## 2022-07-14 RX ADMIN — Medication 1 CAPSULE: at 08:14

## 2022-07-14 RX ADMIN — OXYCODONE HYDROCHLORIDE 2.5 MG: 5 TABLET ORAL at 20:08

## 2022-07-14 RX ADMIN — ACETYLCYSTEINE 2 ML: 200 SOLUTION ORAL; RESPIRATORY (INHALATION) at 16:01

## 2022-07-14 ASSESSMENT — ACTIVITIES OF DAILY LIVING (ADL)
ADLS_ACUITY_SCORE: 39
ADLS_ACUITY_SCORE: 32
ADLS_ACUITY_SCORE: 33
ADLS_ACUITY_SCORE: 33
ADLS_ACUITY_SCORE: 39
ADLS_ACUITY_SCORE: 39
ADLS_ACUITY_SCORE: 33
ADLS_ACUITY_SCORE: 39

## 2022-07-14 NOTE — PROGRESS NOTES
ICU End of Shift Summary. See flowsheets for vital signs and detailed assessment.    Changes this shift: Encouraged to wear BiPAP throughout night, wore ~50% of time. Otherwise on 2 L NC. PRN Zofran, compazine, and atarax given for abdominal discomfort & anxiety related to wearing BiPAP. NPO @ 0100 for IR procedure.     Plan: Placement of HD line in AM. Continue to trend ABG values.

## 2022-07-14 NOTE — PROGRESS NOTES
IR progress note.    Pt was scheduled for TCVC placement for HD. Showing some signs of renal recovery and may not require a tunneled line. Nephrology requesting NTCVC instead. We recommend MICU proceed with NTCVC placement.    IR procedure cancelled. Order Cancelled. Discussed with JESI Smith.    Charis Bolden DNP, APRN  Interventional Radiology   IR on-call pager: [phone]

## 2022-07-14 NOTE — PROCEDURES
PROCEDURE:   Ultrasound guided right HD catheter.     INDICATION: Dialysis    PROCEDURE :   Kera Fischer MD    CONSENT:  Obtained and in the paper chart    UNIVERSAL PROTOCOL: Patient Identification was verified, time out was performed, site marking was done. Imaging data reviewed. Full Barrier precaution done: Hands washed, mask, gloves, gown, and eye protection all used.     PROCEDURE SUMMARY:   The patient's right neck region including the existing cordis catheter was prepped and draped in sterile fashion. The guidewire was advanced through the existing catheter. Ultrasound again documented a wire within the vein.  The cordis catheter was removed. A dialysis catheter was advanced over the guidewire and secured into place with 2 sutures. At time of procedure completion, all ports aspirated and flushed properly. Post-procedure x-ray was pending to confirm placement.    Dr Daigle was available for the key portions of the procedure.    COMPLICATIONS:  None    ESTIMATED BLOOD LOSS: 10-20 mL      Kera Fischer MD  Pulmonary and Critical Care Fellow

## 2022-07-14 NOTE — PROGRESS NOTES
Pt very hesitant to go on NIV while sleeping. Complains of pain and nausea often. Tearful when encouraged to wear device. Anxious often.     Of note, when wearing BiPAP, pt's trigger % only roughly 25-30% of the time.   Current settings are 18/5, RR 22, 30%    Recent ABG's from today below:  Recent Labs   Lab 07/13/22  2112 07/13/22  1744 07/13/22  1145 07/13/22  0428 07/12/22  1555   PH 7.38  --  7.35 7.36 7.33*   PCO2 78*  --  87* 79* 77*   PO2 75*  --  107* 107* 91   HCO3 46*  --  48* 44* 41*   O2PER 30 30 1 30 30         RNs aware of findings.     Nay Simon, LRT, BSRT-RRT

## 2022-07-14 NOTE — PROGRESS NOTES
Pulmonary Medicine  Cystic Fibrosis - Lung Transplant Team  Progress Note  2022       Patient: Sfoie Rodriguez  MRN: [id number]  : 1960 (age 62 year old)  Transplant: 2022 (Lung), POD#16  Admission date: 2022    Assessment & Plan:     Sofie Rodriguez is a 62 year old female with a PMH significant for end-stage COPD, HTN, HFpEF, Mycobacterium peregrinum colonization, h/o hepatitis C, HECTOR s/p LEEP procedure, osteopenia, and former methamphetamine use.  Pt. is now s/p BSLT on 22, lungs slightly undersized.   Persistent low dose pressor needs post-op through 7/10.  Extubated POD #2 but with persistent hypercapnia, mostly compensated and only slightly improved with intermittent NIPPV.  Also with left radial artery thrombus (presumed secondary to arterial line) s/p thrombectomy /, BACILIO, and C diff.  Transferred from CVICU to MICU service on .     Today's recommendations:  - BiPAP intermittently during the day and overnight as tolerated given persistent hypercapnia  - CVTS to strip to see if this improves output (marked decrease in output on right from  to  despite persistent effusion on imaging)  - CT chest non-contrast today (ordered for you) for interval f/u from , defer chest tube placement in IR for small loculation on right, recommend aggressive volume removal  - SNIFF test to evaluate diaphragm function when appropriate (ordered for you)  - Encourage up in chair (minimal day time in bed) and active participation in PT/OT given concern for deconditioning (not sure if this is limited by physical and/or mental barriers)  - Tacrolimus level today to trend, daily levels ordered through  given recent supratherapeutic level  - Prednisone tapered today  - Bactrim for PJP ppx on hold for BACILIO, will consider dapsone as alternative but deferring for now given low hgb  - Following recent bronch cultures from   - DSA, EBV, IgG, and donor risk labs ordered   - Plan for  tunneled vs non-tunneled line and possible initiation of iHD today per nephrology     S/p bilateral sequential lung transplant (BSLT) for end stage COPD:  Persistent hypercapneic respiratory failure:   Persistent hypotension:   Bilateral hydroPTX: Explant pathology with severe emphysema with subpleural bullae formation, changes of chronic bronchitis, subpleural scars and patchy pulmonary edema; benign hilar lymph nodes.  No evidence of PGD post-op.  Extubated 6/30.  Persistent hypercapnia without dyspnea, appears to be a respiratory drive problem.  Diaphragm assessment by US did not show dysfunction (per CVICU team) and metabolic cart study only 6 mins but not supportive of overfeeding.  Some improvement with BiPAP, limited tolerance in part due to anxiety.  Chest CT (7/7) with bilateral small hydroPTX, chest wall subcutaneous emphysema, air collection of right chest wall anteriorly associated with pleura on right, and some narrowing of left sided anastomosis.  Persistent low dose pressor requirement, weaned off 7/10, remains on scheduled midodrine (also s/p hydrocortisone 7/6-7/9 and fludrocortisone 7/6-7/11).  Bronch (7/12) given CXR changes with small amount of granulation tissue posteriorly in right anastomosis (no stenosis or dehiscence), thick brown secretions in proximal airways, and right mainstem/RUL mucous plug (removed).  On 1L NC with intermittent use of BiPAP.  - DSA one month post-transplant (7/28, ordered)  - Ammonia monitoring twice weekly (screening for hyperammonemia post-lung transplant)  - Nebs: levalbuterol and Mucomyst QID   - Pulmonary toilet with chest physiotherapy QID along with Aerobika and incentive spirometry hourly while awake  - Supplemental oxygen as needed to maintain SpO2 >92%, continue BiPAP intermittently during the day and overnight as tolerated given persistent hypercapnia, ABG monitoring per ICU team  - Chest tubes managed by surgical team, CVTS to strip to see if this improves  output (marked decrease in output on right from 7/12 to 7/13 despite persistent effusion on imaging)  - Daily CXR, today with continued bilateral pleural effusions and pulmonary edema and area of left hilum fullness suggestive of atelectasis vs hematoma (personally reviewed)  - CT chest non-contrast today (orderd for you) for interval f/u from 7/7 and consideration of chest tube placement in IR for loculation on right with possible need for lytic therapy, effusion noted to be small and likely not sufficient for chest tube placement but with signficant intra and extra-pulmonary edema (personally reviewed)  - SNIFF test to evaluate diaphragm function (ordered for you)  - Encourage up in chair (minimal day time in bed) and active participation in PT/OT given concern for deconditioning (not sure if this is limited by physical and/or mental barriers)     Immunosuppression:  Induction therapy with basiliximab (and high dose IV steroid) given intraoperatively, repeating basiliximab dose on POD#4.  - Tacrolimus 5 mg BID (via NJ tube, held 7/11-7/12 for supratherapeutic level, peak level appropriate 7/11).  Goal level 8-12.  Trend level today 8.3, no dose adjustment.  Repeat level daily through 7/16 (ordered).  - MMF 1000 mg BID (decreased 7/10 due to diarrhea)  - Prednisone tapered to 15 mg BID, next taper due 7/21  Date AM dose (mg) PM dose (mg)   7/14/22 15 15   7/21/22 15 12.5   7/28/22 12.5 12.5   8/4/22 12.5 10   8/18/22 10 10   9/15/22 10 7.5   10/13/22 7.5 7.5   11/10/22 7.5 5   12/8/22 5 5   1/5/23 5 2.5      Prophylaxis:   - Bactrim for PJP ppx on hold since 7/7 for BACILIO, will consider dapsone as alternative but deferring for now given low hgb (G6PD sufficient)  - VGCV for CMV ppx (started 7/7)  - Nystatin for oral candidiasis ppx, 6 month course  - See below for serologies and viral ppx:    Donor Recipient Intervention   CMV status Positive Positive Valganciclovir POD #8-90   EBV status Positive Positive EBV  check monthly (7/28, ordered)   HSV status N/A Positive Not indicated      ID:  H/o M. peregrinum colonization: Donor bronch cultures (OSH) with Strep beta hemolytic (not group A).  Recipient cultures as below.  - IgG at one month (7/28, ordered)  - Bronch cultures (7/12) NGTD, follow  - AFB bronch culture (6/28, 6/29) NGTD, AFB to be sent on all future bronchs     Streptococcus pneumoniae:  Stenotrophomonas maltophilia: Noted in recipient cultures at time of transplant.  S/p ceftazidime 6/28-7/10, vancomycin 7/7-7/8 and 6/28-6/30, and levofloxacin 7/10-7/12 for total 2 week ABX course.     H/o hepatitis C: Diagnosed in 1980s, 2 mos of treatment, quant negative since 10/2017, last positive 2/20/17 ([phone]).  Glenis positive on 6/2021 with negative HCV PCR.  H/o remote EtOH abuse.  MR elastography (4/27/21) with hepatology review and consult without any concerns post transplant.  Hepatitis C RNA negative and hepatitis C antibody positive (old) on 6/28.     PHS risk criteria donor:  Additional labs required post-transplant (between 4-8 weeks post-op): Hepatitis B, Hepatitis C, and HIV by SHERI ([id number], ordered 7/28).     Other relevant problems managed by primary team:      BACILIO:   Hyperkalemia: Likely multifactorial including medications (Bactrim, tacrolimus) and hypotension.  Fludrocortisone 7/6-7/11.  Potassium now stable.  - Tacrolimus monitoring as above  - Volume management per nephrology and ICU team  - Plan for tunneled vs non-tunneled line and possible initiation of iHD today per nephrology     C diff infection: Abdominal pain with diarrhea noted 7/8, AXR without dilated bowel, moderate colonic stool burden.  C diff positive 7/11.    - PO vancomycin (7/11) per ICU team    Left hand ischemia: Radial artery thrombosis identified on duplex doppler.  S/p thrombectomy on 7/2.  Completed high intensity heparin.  Continue daily aspirin.     We appreciate the excellent care provided by the MICU team.  Recommendations  "communicated via text, in person rounding, and this note.  Will continue to follow along closely, please do not hesitate to call with any questions or concerns.     Patient discussed with Dr. Miller.    Catherine Johnson, DNP, APRN, CNP  Inpatient Nurse Practitioner  Pulmonary CF/Transplant     Subjective & Interval History:     Pt. on NIPPV most of the night with 1-2L NC during the day.  Cough effort very weak and unable to expectorate independently despite congestion with cough.  Three chest tubes remain with marked decrease in output yesterday in right basilar tube, concern for patency.  Intermittent anxiety noted, also c/o generalized pain on several occasions that is nondescript and pt. unable to further describe.  On TF via NJ, no N/V, rectal tube for loose stools.     Review of Systems:     C: No fever, no chills, no recent change in weight  INTEGUMENTARY/SKIN: No rash or obvious new lesions  ENT/MOUTH: No sore throat, no sinus pain, no nasal congestion or drainage  RESP: See interval history  CV: No chest pain, no palpitations, + peripheral edema, no orthopnea  GI: No reflux symptoms  : No dysuria  MUSCULOSKELETAL: See interval history  ENDOCRINE: Blood sugars with intermittent elevation  NEURO: No headache, no numbness or tingling  PSYCHIATRIC: See interval history    Physical Exam:     All notes, images, and labs from past 24 hours (at minimum) were reviewed.    Vital signs:  Temp: 97.9  F (36.6  C) Temp src: Oral BP: 121/71 Pulse: 104   Resp: 9 SpO2: 96 % O2 Device: Nasal cannula Oxygen Delivery: 2 LPM Height: 157.5 cm (5' 2\") Weight: 75.6 kg (166 lb 10.7 oz)  I/O:     Intake/Output Summary (Last 24 hours) at 7/14/2022 0715  Last data filed at 7/14/2022 0600  Gross per 24 hour   Intake 1443.08 ml   Output 1385 ml   Net 58.08 ml     Constitutional: Lying in bed, in no apparent distress.   HEENT: Eyes with pink conjunctivae, anicteric.  Oral mucosa moist without lesions.   PULM: Good air flow bilaterally.  No " crackles, no rhonchi, no wheezes.  Non-labored breathing on 2L NC --> desat to 84% (and dropping) on RA, settled on 1L NC.  CV: Normal S1 and S2.  Transient SVT, resolved with vagal maneuver.  No murmur, gallop, + pericardial rub.  2-3+ BLE edema.   ABD: NABS, soft, nontender, nondistended.    MSK: Moves all extremities.  + muscle wasting.   NEURO: Hypoactive, eyes closed for majority of interview, minimally conversant with short soft answers.   SKIN: Warm, dry.  No rash on limited exam.   PSYCH: Mood stable.     Lines, Drains, and Devices:  CVC Double Lumen Right Internal jugular (Active)   Site Assessment WDL 07/14/22 0400   Dressing Type Chlorhexidine disk;Transparent 07/14/22 0400   Dressing Status clean;dry;intact 07/14/22 0400   Dressing Intervention new dressing;dressing changed 07/12/22 1600   Dressing Change Due 07/17/22 07/14/22 0400   Tubing Change secondary tubing;primary tubing 06/29/22 0400   Line Necessity yes, meets criteria 07/14/22 0400   Brown - Status saline locked 07/14/22 0400   Brown - Cap Change Due 07/15/22 07/14/22 0400   White - Status infusing 07/14/22 0400   White - Cap Change Due 07/15/22 07/14/22 0400   Phlebitis Scale 0-->no symptoms 07/14/22 0400   Infiltration? no 07/14/22 0400   Infiltration Scale 0 07/13/22 1200   Infiltration Site Treatment Method  None 07/12/22 1600   Was a vesicant infusing? no 07/12/22 1600   CVC Comment MAC capped 07/12/22 1600   Number of days: 16     Data:     LABS    CMP:   Recent Labs   Lab 07/14/22  0459 07/14/22  0101 07/13/22  2124 07/13/22  1746 07/13/22  1639 07/13/22  0434 07/13/22  0428 07/12/22  2055 07/12/22  1555 07/12/22  0419 07/12/22  0414 07/11/22  0805 07/11/22  0438     --   --   --  138  --  139  --  136   < > 137   < > 139   POTASSIUM 4.3  --   --   --  4.3  --  4.5  --  4.5   < > 4.4   < > 4.4   CHLORIDE 92*  --   --   --  90*  --  89*  --  91*   < > 93*   < > 93*   CO2 42*  --   --   --  42*  --  42*  --  38*   < > 35*   < > 38*    ANIONGAP 6*  --   --   --  6*  --  8  --  7   < > 9   < > 8   *  193* 234* 191*   < > 192*   < > 239*   < > 201*   < > 253*   < > 233*   .0*  --   --   --  153.0*  --  150.0*  --  134.0*   < > 134.0*   < > 126.0*   CR 3.00*  --   --   --  3.18*  --  3.23*  --  3.06*   < > 3.11*   < > 2.78*   GFRESTIMATED 17*  --   --   --  16*  --  16*  --  17*   < > 16*   < > 19*   ESTUARDO 8.6*  --   --   --  8.4*  --  8.6*  --  8.4*   < > 8.4*   < > 8.4*   MAG 3.1*  --   --   --   --   --  2.8*  --   --   --  3.1*  --  2.7*   PHOS 5.1*  --   --   --   --   --  5.0*  --   --   --  5.4*  --  5.4*   PROTTOTAL 4.6*  --   --   --   --   --   --   --   --   --   --   --  4.6*   ALBUMIN 2.6*  --   --   --   --   --   --   --   --   --   --   --  2.7*   BILITOTAL 0.2  --   --   --   --   --   --   --   --   --   --   --  <0.2   ALKPHOS 64  --   --   --   --   --   --   --   --   --   --   --  78   AST 22  --   --   --   --   --   --   --   --   --   --   --  16   ALT 22  --   --   --   --   --   --   --   --   --   --   --  22    < > = values in this interval not displayed.     CBC:   Recent Labs   Lab 07/14/22  0459 07/13/22  0428 07/12/22  1136 07/12/22  0414 07/11/22  0438   WBC 10.5 10.4  --  15.2* 18.4*   RBC 2.19* 2.31*  --  2.31* 2.47*   HGB 6.8* 7.1* 7.1* 7.2* 7.7*   HCT 22.4* 23.7*  --  24.3* 25.5*   * 103*  --  105* 103*   MCH 31.1 30.7  --  31.2 31.2   MCHC 30.4* 30.0*  --  29.6* 30.2*   RDW 16.4* 16.3*  --  16.3* 16.5*    255  --  286 287       INR: No lab results found in last 7 days.    Glucose:   Recent Labs   Lab 07/14/22  0459 07/14/22  0101 07/13/22  2124 07/13/22  1746 07/13/22  1639   *  193* 234* 191* 192* 192*       Blood Gas:   Recent Labs   Lab 07/14/22  0503 07/13/22  2112 07/13/22  1744   PHV 7.34  --  7.34   PCO2V 85*  --  85*   PO2V 52*  --  42   HCO3V 46*  --  46*   LINDY 18.6*  --  18.2*   O2PER 0 30 30       Culture Data No results for input(s): CULT in the last 168  hours.    Virology Data:   Lab Results   Component Value Date    FLUAH1 Not Detected 06/29/2022    FLUAH3 Not Detected 06/29/2022    UC2692 Not Detected 06/29/2022    IFLUB Not Detected 06/29/2022    RSVA Not Detected 06/29/2022    RSVB Not Detected 06/29/2022    PIV1 Not Detected 06/29/2022    PIV2 Not Detected 06/29/2022    PIV3 Not Detected 06/29/2022    HMPV Not Detected 06/29/2022       Historical CMV results (last 3 of prior testing):  No results found for: CMVQNT  No results found for: CMVLOG    Urine Studies    Recent Labs   Lab Test 07/08/22  0831 07/05/22  1004   URINEPH 5.5 5.5   NITRITE Negative Negative   LEUKEST Negative Negative   WBCU 1 5       Most Recent [facility] Pulmonary Function Testing (FVC/FEV1 only)  FVC-Pre   Date Value Ref Range Status   04/29/2022 1.82 L    11/11/2021 2.17 L    06/14/2021 2.00 L      FVC-%Pred-Pre   Date Value Ref Range Status   04/29/2022 58 %    11/11/2021 70 %    06/14/2021 64 %      FEV1-Pre   Date Value Ref Range Status   04/29/2022 0.51 L    11/11/2021 0.53 L    06/14/2021 0.54 L      FEV1-%Pred-Pre   Date Value Ref Range Status   04/29/2022 20 %    11/11/2021 21 %    06/14/2021 21 %        IMAGING    Recent Results (from the past 48 hour(s))   XR Abdomen Port 1 View    Narrative    Exam: XR ABDOMEN PORT 1 VIEWS 7/12/2022 11:00 AM    Indication: Abdominal pain    Comparison: Radiograph 7/8/2022    Findings:   Two AP supine views of the abdomen. Partially visualized postsurgical  changes of bilateral left lung transplant with clamshell sternotomy  wires. Bilateral chest tubes and mediastinal drains in place.  Bilateral pleural effusions, left greater than right.    Enteric tube with tip projecting over the third portion of the  duodenum. Nonobstructive bowel gas pattern. Large stool burden in the  left splenic flexure and descending colon. No pneumatosis or portal  venous gas. Nonspecific new densities projecting over the pelvis,  likely intraluminal or external to the  patient. No acute osseous  abnormalities.        Impression    Impression:   1. Nonobstructive bowel gas pattern without pneumatosis. Large stool  burden in the left splenic flexure and descending colon.  2. The feeding tube tip projects over the third portion of the  duodenum.    I have personally reviewed the examination and initial interpretation  and I agree with the findings.    NADIR NOLEN MD         SYSTEM ID:  [id number]   XR Chest Port 1 View    Narrative    EXAM: XR CHEST PORT 1 VIEW  7/13/2022 6:00 AM     HISTORY:  chest tubes and R hydropneumothorax s/p bilateral lung  transplant       COMPARISON:  Chest x-ray 7/12/2022    FINDINGS: AP radiograph of the chest. Postoperative changes of  bilateral lung transplant with intact median clamshell sternotomy  wires. Right IJ sheath with tip at the innominate confluence. Right  arm midline catheter with tip overlying the right axilla. Stable  positioning of bibasilar chest tubes and mediastinal drain.    Stable cardiomediastinal silhouette. Atherosclerotic calcifications of  the aortic arch. Improved aeration of the left lung base. Small left  pleural effusion. No appreciable pneumothorax. Continued dense  retrocardiac and patchy left perihilar airspace opacities. Decreased  subpleural opacities at the lung apices.      Impression    IMPRESSION:   1. Improved aeration to the left lung base.  2. Decreased left pleural effusion.  3. Decreased subpleural apical opacities.  4. Unchanged retrocardiac and perihilar opacities, likely pulmonary  edema and/or atelectasis.    I have personally reviewed the examination and initial interpretation  and I agree with the findings.    YANNICK BENAVIDEZ MD         SYSTEM ID:  [id number]

## 2022-07-14 NOTE — PROGRESS NOTES
HEMODIALYSIS TREATMENT NOTE    Date: 7/14/2022  Time: 4:43 PM    Data:  Pre Wt: 75.5 kg    Desired Wt: 74.5  kg   Post Wt: 74.8 kg   Weight change:  0.7 kg  Ultrafiltration - Post Run Net Total Removed (mL): 700 mL  Vascular Access Status: CVC  patent  Dialyzer Rinse: Clear  Total Blood Volume Processed: 17.3 L   Total Dialysis (Treatment) Time: I hour 23 mins   Dialysate Bath: K 3, Ca 2.25  Heparin: None    Lab:   No    Interventions:  BACILIO pt who got initiated today following placement of non tunneled RIJ. HD started with reversed lines d/t mild resistance in the arterial lumen. As tx progresses, pt became tachycardic with HR up to 130s, writer dropped goal from 2 to 1L. 37mins to end tx, pt rhythm converted from sinus tachy to Afib with HR in the 150s, HD ended, and pt was rinsed back per JESI Leblanc. 0.7L of fluid net was pulled. Ending BP was 114/64. Lumen were saline locked, end caps changed, and hand off report was given to RIYA Carlson.    Assessment:  -Pt remain lethargic  -Remain on 1L of 02 via nasal cannula  -Remain tachycardic but asymptomatic     Plan:    Per renal

## 2022-07-14 NOTE — PROGRESS NOTES
"Cardiothoracic Surgery Progress Note    Subjective: Intermittent BiPAP use; trending VBGs that have been stable. No plan for tunneled line today as she is slowly showing signs of renal recovery.     Objective:   /71   Pulse 104   Temp 97.9  F (36.6  C) (Oral)   Resp 9   Ht 1.575 m (5' 2\")   Wt 75.6 kg (166 lb 10.7 oz)   SpO2 96%   BMI 30.48 kg/m      PE:  General: comfortable in bed, follows commands  Neuro: A&Ox3, conversational, appropriate  Resp: intermittent BiPAP use vs nasal cannula  CV: Regularr rate  Abdomen: Soft, non-tender, non-distended   Incisions: c/d/i  Extremities: warm and well perfused      Intake/Output Summary (Last 24 hours) at 7/13/2022 0946  Last data filed at 7/13/2022 0800  Gross per 24 hour   Intake 1325.42 ml   Output 1982 ml   Net -656.58 ml       Labs reviewed  VBG 7.34/85/52/46    Imaging reviewed  CXR  Impression:   1. Stable support devices  2. Bilateral pleural effusions with associated atelectasis.  3. Stable edema.  4. Prominent left hilum could represent atelectasis or postop  hematoma. This is unchanged. Focal consolidative process from  pneumonia isn't excluded.    A/P: 62F PMH of oxygen-dependent COPD, HFpEF, HTN, HCV, and osteopenia who underwent bilateral lung transplant on 6/28/22 with Dr. Sunshine. This was complicated by left upper extremity acute limb ischemia s/p left radial thrombectomy on 7/1/22. Recovering renal function, will hold off on tunneled dialysis line at this time.      - Continue BiPAP as tolerated   Trending VBGs  - No plans for tunneled dialysis line as she is showing signs of renal recovery  - Monitor chest tube output   Tubes remain today  - CXR daily    Will discuss with CV fellow and CV staff.    Vic Montelongo MD  Surgery PGY-3    "

## 2022-07-14 NOTE — PLAN OF CARE
ICU End of Shift Summary. See flowsheets for vital signs and detailed assessment.    Changes this shift:   Pt agreed to be on BiPap from 1400 on. Needed education and encouragement to have it put back on. Tolerated well. Reported pain but reported some relief after Zofran and Compazine were administered.     Plan:   Will continue to assess. Changes and concerns will be reported to provider.

## 2022-07-14 NOTE — PROGRESS NOTES
Nephrology Progress Note  07/14/2022         Sofie Rodriguez is a 62 yom with hx of HTN, Hep C, osetopenia, previous polysubstance use (stopped 2019) and severe COPD who is s/p BSLT on 6/28/2022.  Had ~2.5h of bypass time, was uncomplicated but now has post op BACILIO in setting of continued need for vasopressors thought to be due to vasoplegia.  Had radial artery thrombus requiring thrombectomy on 7/2.  Nephrology consulted for BACILIO with mild hyperkalemia and oliguric UOP.       Interval History :   Mrs Higgins's Cr/BUN continue to show stable but very low clearance.  Given this we are planning to start dialysis, switched plan to temp line as she has chance of short term recovery in the next week or two.  Will do short 2h low blood flow run, increased bicarb to maintain pH.  Will plan to run again tomorrow with slightly longer, higher flow run.       Assessment & Recommendations:   BACILIO-Normal baseline renal fx historically and at time of surgery.  No renal imaging but low suspicion of obstruction so will consider if she does not show improvement.  BACILIO is likely hypoperfusion with ~2.5h of bypass time and tacrolimus although levels have been within range (needing higher doses of tacro).   Will manage this medically for now and continue to monitor.  UA initially showed hyaline casts on 6/28..  Noted that she did not get contrast for thrombectomy on 7/2.  Cr had started to improve but now again on the rise.                -BACILIO, likely hypoperfusion and need for tacro, also now with supratherapeutic vanco.   With lack of improvement and ongoing need for tacro with up and down levels we are planning to start dialysis.  With some renal fx and chance of recovery soon we are planning to re-wire her RIJ to an HD line.    -HD today, 200bfr/400dfr, 1-2L of UF, bicarb 40 to maintain.     -Dialysis consent is signed and scanned in under media 7/14.                -Continue to avoid nephrotoxins, norepi weaned off, would keep SBP >100  "as able, hypotensive due to vasoplegia.       Volume- without diuretics yesterday, planning on 1-2L of UF with run today as she is edematous and BP's are stable but would back off for hypotension as we are mostly running for clearance.      Electrolytes-K 4.3, bicarb 42, VBG 7.34/85/52/46.  Hypercapnia thought to be related to longstanding COPD and should get better with time after transplant.       BMD-Ca up at 8.6, iCal WNL throughout, will follow.  Mg and Phos mildly up consistent with BACILIO.       Lung Tx-On Tacro, levels have been within range, last level 7.9.  Has hypercapnia despite transplant and reasonable CXR/oxygenation, team expects this to improve with time.       Anemia-Hgb 6.8, acute management per team.       Nutrition-Renal TF     Time spent: 30 minutes on this date of encounter for chart review, physical exam, medical decision making and co-ordination of care.      Seen and discussed with Dr Bauman     Recommendations were communicated to primary team via verbal communication.       RUFUS Cedeño CNS  Clinical Nurse Specialist  [phone]    Review of Systems:   I reviewed the following systems:  Gen: No fevers or chills  CV: No CP at rest  Resp: No SOB at rest  GI: No N/V    Physical Exam:   I/O last 3 completed shifts:  In: 1478.08 [I.V.:158.08; NG/GT:690]  Out: 1535 [Urine:715; Stool:650; Chest Tube:170]   /64   Pulse 80   Temp 98  F (36.7  C)   Resp (!) 7   Ht 1.575 m (5' 2\")   Wt 75.5 kg (166 lb 7.2 oz)   SpO2 97%   BMI 30.44 kg/m       GENERAL APPEARANCE: On NC, in mild distress.    EYES: No scleral icterus  NECK:  No JVD  Pulmonary: lungs clear to auscultation with equal breath sounds bilaterally, no clubbing or cyanosis  CV: Regular rhythm, normal rate, no rub   - Edema+2 generalized.   GI: soft, nontender, normal bowel sounds  MS: no evidence of inflammation in joints, no muscle tenderness  : + Dong  SKIN: no rash, warm, dry  NEURO: Answering questions " appropriately, no focal deficits.      Labs:   All labs reviewed by me  Electrolytes/Renal - Recent Labs   Lab Test 07/14/22  0752 07/14/22 0459 07/13/22  1746 07/13/22  1639 07/13/22  0434 07/13/22  0428 07/12/22  0419 07/12/22  0414   NA  --  140  --  138  --  139   < > 137   POTASSIUM  --  4.3  --  4.3  --  4.5   < > 4.4   CHLORIDE  --  92*  --  90*  --  89*   < > 93*   CO2  --  42*  --  42*  --  42*   < > 35*   BUN  --  155.0*  --  153.0*  --  150.0*   < > 134.0*   CR  --  3.00*  --  3.18*  --  3.23*   < > 3.11*   * 200*  193*   < > 192*   < > 239*   < > 253*   ESTUARDO  --  8.6*  --  8.4*  --  8.6*   < > 8.4*   MAG  --  3.1*  --   --   --  2.8*  --  3.1*   PHOS  --  5.1*  --   --   --  5.0*  --  5.4*    < > = values in this interval not displayed.       CBC -   Recent Labs   Lab Test 07/14/22 0459 07/13/22 0428 07/12/22  1136 07/12/22  0414   WBC 10.5 10.4  --  15.2*   HGB 6.8* 7.1* 7.1* 7.2*    255  --  286       LFTs -   Recent Labs   Lab Test 07/14/22 0459 07/11/22  0438 07/07/22  0323   ALKPHOS 64 78 63   BILITOTAL 0.2 <0.2 <0.2   ALT 22 22 24   AST 22 16 23   PROTTOTAL 4.6* 4.6* 4.6*   ALBUMIN 2.6* 2.7* 2.8*       Iron Panel - No lab results found.        Current Medications:    acetaminophen  975 mg Oral or Feeding Tube TID     acetylcysteine  2 mL Nebulization 4x Daily     alteplase  1 mg Intravenous Once     aspirin  81 mg Oral Daily     calcium carbonate 600 mg-vitamin D 400 units  1 tablet Oral BID w/meals     heparin ANTICOAGULANT  5,000 Units Subcutaneous Q8H TONY     insulin aspart  1-12 Units Subcutaneous Q4H     insulin glargine  30 Units Subcutaneous QAM AC     lactobacillus rhamnosus (GG)  1 capsule Oral BID     levalbuterol  1.25 mg Nebulization 4x Daily     midodrine  20 mg Oral or Feeding Tube Q8H     multivitamin, therapeutic  1 tablet Per Feeding Tube Daily     mycophenolate  1,000 mg Oral or Feeding Tube BID     nystatin   Topical BID     nystatin  1,000,000 Units Swish &  Swallow 4x Daily     pantoprazole  40 mg Oral or Feeding Tube Daily     predniSONE  15 mg Per Feeding Tube BID     protein modular  1 packet Per Feeding Tube Daily     sodium chloride (PF)  3 mL Intracatheter Q8H     tacrolimus  5 mg Per Feeding Tube BID IS     thiamine  100 mg Oral or Feeding Tube Daily     valGANciclovir  450 mg Oral or NG Tube Once per day on Mon Thu     vancomycin  125 mg Oral or Feeding Tube Q6H TONY       dextrose Stopped (07/14/22 0807)

## 2022-07-14 NOTE — PROGRESS NOTES
MEDICAL ICU PROGRESS NOTE  07/14/2022      Date of Service (when I saw the patient): 07/14/2022    Assessment: Critical Care   Sofie Rodriguez is a 62 year old female with PMHx of end stage COPD s/p b/l lung transplant (6/28/22), HTN, HFpEF, hx of hepatitis C, osteopenia, and former methamphetamine use admitted to the medical ICU on 7/13/2022 with hospital course complicated by left upper extremity acute limb ischemia s/p left radial thrombectomy on 7/1/22. Remains in ICU for acute hypercapnic respiratory failure intermittently on BIPAP and worsening BACILIO.    Plan: Critical Care      CHANGES and MAJOR THINGS TODAY:   - Discontinued D10 infusions  - CT this morning for chest tube evaluation on right side   - Holding tube feeds for potential R CT placement  - VBG after bipap today  - NIF test ordered to evaluate for respiratory muscle weakness  - Myasthenia gravis panel ordered to evaluate for respiratory muscle weakness  - Transfused RBCs today  - Sliding scale insulin today, will re-evaluate insulin needs tomorrow  - Avoiding oxycodone for decreased respiratory drive  - Continue midodrine today with iHD starting today - can consider decreasing tomorrow  - Discontinued NaHCO3 tabs   - Plan to place temporary exchange catheter for iHD 7/14  - Vascular consult for a peripheral IV placement  - Per pulm no plans for a chest tube placement today - restart tube feeds    Neuro/ pain/ sedation:  # Pain  Postoperative pain noted by surgery team. On 7/13 admission interview, does not endorse pain. On 7/14 states she is having diffuse pain.  - Scheduled: acetaminophen, robaxin  - Decrease oxycodone from 5 to 2.5mg q4h PRN     # Anxiety  Patient becomes anxious and uncomfortable while on BIPAP, thereby limiting use.  - Encourage use of BIPAP at night and during daytime naps  - Consider changing form of BIPAP mask if uncomfortable  - Atarax 25-50 q6hr PRN     Pulmonary:  # S/p BOLT (6/28)  # Postoperative ventilatory support  #  Acute hypercapnic respiratory failure, likely 2/2 decreased central respiratory drive vs respiratory muscle weakness  Hx of COPD s/p bilateral lung transplant on 6/28. By chart review, since 7/2 noted to have hypercarbia not adequately compensated for by respiratory function. On exam, patient is noted to take shallow, infrequent breaths with occasional hyperventilation. Differential for hypercapnic respiratory failure includes decreased central respiratory drive vs respiratory muscle weakness vs intrinsic lung pathology. Patient has been on minimal sedating medications, is mentating appropriately decreasing concern for encephalitis, and had normal TSH last week decreasing concern for hypothyroidism. Diaphragmatic weakness s/p transplant uncommon, yet can occur. Bedside U/S per CVICU team showed normal diaphragmatic movement, formal evaluation with SNIFF test performed on 7/14. Transplanted lungs also noted to be small for body size; could be contributor to decreased respiratory compensation for hypercarbia. pH improves when patient on BIPAP; last ABG on 7/13 showed pH 7.35 while on nasal cannula, however patient still noted to have elevated pCO2 and bicarb. Last bronch 7/12 for clearance/inspection given weak cough.  - F/u bronch cultures from 7/12  - Repeat VBGs 7/14 PM before nighttime BIPAP  - BIPAP usage at night and during daytime naps; nasal cannula otherwise  - Supplemental oxygen to keep saturation about 92%  - NIF test ordered 7/14 to evaluate for respiratory muscle weakness  - Myasthenia gravis panel ordered to evaluate for respiratory muscle weakness  - Sniff test with fluoroscopy 7/14 to evaluate diaphragm function  - Decreasing oxycodone dosage as tolerated     Immunosuppression  Prednisone 15mg BID  Tacrolimus 5mg BID  MMF 1000mg BID     Prophylaxis  CMV - Valgancyclovir   Thrush - PO Nystatin  PJP - TMP-SMX (held d/t BACILIO)     # Bilateral surgical chest tubes  CT ordered by tx pulm 7/14 to evaluate for  possible R lung fluid collection.  - Daily CXR  - Awaiting recs from tx pulm on possible R CT placement - no plan for chest tube today   - restart tube feeds 7/14  - CV surgery team managing and following CTs placed during transplant     Cardiovascular:  # Shock, etiology unclear, improving  # Hx of HTN  # Hx of HFpEF  Off levophed gtt since 7/10. 7/7 echo unchanged from previous, showing preserved EF with normal LV function making new cardiogenic cause of hypotension less likely. S/p hydrocortisone 7/6-7/9 and fludrocortisone 7/6-7/11, which helped minimally, making adrenal insufficiency less likely. Intravascular depletion possible although minimal changes with increased diuresis. Previously on midodrine 30mg TID, now decreased to 20mg as of 7/13.  - Midodrine 20mg TID - will re-evaluate after dialysis run 7/14  - Hold PTA lasix 20mg qd  - CTM     GI/Nutrition:  # C. diff positive (7/11)  # Abdominal pain  # Diarrhea  7/12 KUB showing stool burden. Frequent loose stools via rectal pouch. Worsening diarrhea, flagyl dose x1 given. C. Diff positive on 7/11 with vancomycin started 7/12.  - Vancomycin 125mg PO QID (7/12-7/26)  - Hold bowel regimen: miralax, milk of magnesia prn d/t diarrhea     # NJ tube  Adult Formula Drip Feeding: Continuous Novasource Renal; Nasojejunal; Goal Rate: 35; initiate at goal rate; mL/hr; Medication - Feeding Tube Flush Frequency: At least 15-30 mL water before and after medication administration and with tube clogging...  - TF at goal; tolerating PO intake  - Nutrition following     Renal/ Fluid Balance:   # BACILIO, likely mixed prerenal/intrinsic etiology  # Hypermagnesemia  # Hyperphosphatemia  Normal baseline renal function prior to and at time of surgery. Likely multifactorial due to hypotension (2.5h bypass during surgery), causing prerenal injury, and prior administration of nephrotoxic drugs including tacrolimus and vancomycin. 6/28 UA showed hyaline casts. Cr had initially improved,  but now in combination with BUN, is continuing to rise. Nephrology on 7/13 believes BUN of 150 suggests poor clearance despite reasonable UOP, thereby increasing motivation to start iHD. Cr improved as of 7/14 to 3.00 although BUN persistently elevated to 155.  - Strict I/O; daily weights  - Nephrology following; recs appreciated              - right internal jugular central catheter replaced with a HD line on 7/14 - iHD starting on 7/14  - Discontinued NaHC03 tabs                 Endocrine:  # Stress-induced hyperglycemia  Patient was placed on D10 overnight 7/13 for NPO status per IR, was consistently hyperglycemic with sliding scale insulin only.  - High dose sliding scale insulin started 7/2 - continue sliding scale insulin only 7/14 when feeds restarted  - Glargine 30 units held with NPO, can restart on 7/15 AM     ID:  # Hx of streptococcus pneumoniae  # Hx of stenotrophomonas maltophilia  Noted in recipient cultures at time of transplant. S/p ceftazidime 6/28-7/10, vancomycin 6/28-6/30 and 7/7-7/8, and IV levofloxacin 7/10-7/12 for total 2 week abx course.   - CTM     Hematology:  # Acute blood loss anemia, stable  # LUE limb ischemia s/p L radial thrombectomy (7/1/22)  Hgb dropped 7/14 to 6.8, no clinical or laboratory indications of active bleeding. Now s/p 1u pRBC on 7/14.  - Recheck CBC 7/15 AM  - CTM with daily CBC  - Transfuse if hgb <7  - On SQH     # Leukocytosis, resolved  WBC count wnl and stable 7/14 at 10.5.  - CTM     MSK:   PT and OT consulted. Appreciate recommendations.     Skin  Pressure wound noted related to nasal cannula use.    Lines and Drains:  - Right HD line  - PIV x 2 Right arm  - Chest tube x 3  - Dong  - Rectal tube     Prophylaxis:  - DVT Prophylaxis: Heparin SQ  - PUD Prophylaxis: PPI     Code Status: Full Code       Disposition:  - ICU  - Discuss w/ transplant pulm tomorrow about potential transfer to floor     The patient's care was discussed with the Attending Physician,   Pilo NORTH, MS4    Resident/Fellow Attestation   I, Johann Forman MD, was present with the medical/EMILY student who participated in the service and in the documentation of the note.  I have verified the history and personally performed the physical exam and medical decision making.  I agree with the assessment and plan of care as documented in the note.      Johann Forman MD, PhD  PGY1, Internal Medicine and Pediatrics    ====================================  INTERVAL HISTORY:   Nursing notes reviewed. No acute events. Wore BIPAP 50% of the time last night, otherwise on nasal cannula 2L. Atarax given for anxiety related to wearing BIPAP. Pressure wound noted related to nasal cannula use. Patient was NPO for IR procedure, however renal decided to have MICU place a temp line with some renal recovery, patient hyperglycemic in the setting of D10 drip on at 35 ml/hr overnight. Morning insulin held.    OBJECTIVE:   1. VITAL SIGNS:   Temp:  [96.4  F (35.8  C)-99  F (37.2  C)] 96.4  F (35.8  C)  Pulse:  [] 83  Resp:  [7-31] 12  BP: (106-135)/(49-92) 119/59  FiO2 (%):  [30 %] 30 %  SpO2:  [92 %-100 %] 100 %  FiO2 (%): 30 %  Resp: 12    2. INTAKE/ OUTPUT:   I/O last 3 completed shifts:  In: 1478.08 [I.V.:158.08; NG/GT:690]  Out: 1535 [Urine:715; Stool:650; Chest Tube:170]    3. PHYSICAL EXAMINATION:  General: somnolent with eyes closed, responds slowly to questions, does not appear in distress  Neuro: moving all extremities, AOx3, no focal neurological deficits  CV: RRR, no rubs/murmurs.  Pulm: lungs clear to auscultation bilaterally, shallow breathing, unable to take deep breaths, intermittent hyperventilation with pursed lips  Abd: soft, non-distended, non-tender  : producing urine by montgomery catheter  Extremities: 2+ pitting edema to her thighs bilaterally  Skin: dry, pressure wound on nose  Psych: intermittent anxiety    4. LABS:   Arterial Blood Gases   Recent Labs   Lab  07/13/22  2112 07/13/22  1145 07/13/22  0428 07/12/22  1555   PH 7.38 7.35 7.36 7.33*   PCO2 78* 87* 79* 77*   PO2 75* 107* 107* 91   HCO3 46* 48* 44* 41*     Complete Blood Count   Recent Labs   Lab 07/14/22  0459 07/13/22  0428 07/12/22  1136 07/12/22  0414 07/11/22  0438   WBC 10.5 10.4  --  15.2* 18.4*   HGB 6.8* 7.1* 7.1* 7.2* 7.7*    255  --  286 287     Basic Metabolic Panel  Recent Labs   Lab 07/14/22  1001 07/14/22  0752 07/14/22 0459 07/13/22  1746 07/13/22  1639 07/13/22  0434 07/13/22 0428 07/12/22 2055 07/12/22  1555   NA  --   --  140  --  138  --  139  --  136   POTASSIUM  --   --  4.3  --  4.3  --  4.5  --  4.5   CHLORIDE  --   --  92*  --  90*  --  89*  --  91*   CO2  --   --  42*  --  42*  --  42*  --  38*   BUN  --   --  155.0*  --  153.0*  --  150.0*  --  134.0*   CR  --   --  3.00*  --  3.18*  --  3.23*  --  3.06*   *  166*  166* 212* 200*  193*   < > 192*   < > 239*   < > 201*    < > = values in this interval not displayed.     Liver Function Tests  Recent Labs   Lab 07/14/22 0459 07/11/22 0438   AST 22 16   ALT 22 22   ALKPHOS 64 78   BILITOTAL 0.2 <0.2   ALBUMIN 2.6* 2.7*     Coagulation Profile  No lab results found in last 7 days.    5. RADIOLOGY:   Recent Results (from the past 24 hour(s))   XR Chest Port 1 View    Narrative    Exam: XR CHEST PORT 1 VIEW, 7/14/2022 5:44 AM    Indication: chest tubes and R hydropneumothorax s/p bilateral lung  transplant    Comparison: 7/13/2022    Findings:   Right IJ sheath at the confluence of the innominate veins. Feeding  tube seen coursing through the mediastinum. Tip projects off the film.  No change in the bibasilar chest tubes or mediastinal drain. Intact  clamshell sternotomy wires.    Hazy opacity over both lower lobes suggesting effusion and  atelectasis. Mild perihilar haziness suggesting edema. Fullness of the  left hilum could represent atelectasis. Focal consolidative process  isn't excluded. Biapical pleural  thickening.      Impression    Impression:   1. Stable support devices  2. Bilateral pleural effusions with associated atelectasis.  3. Stable edema.  4. Prominent left hilum could represent atelectasis or postop  hematoma. This is unchanged. Focal consolidative process from  pneumonia isn't excluded.    GABRIELLA SNELL MD         SYSTEM ID:  [id number]

## 2022-07-14 NOTE — PLAN OF CARE
Goal Outcome Evaluation:  ICU End of Shift Summary. See flowsheets for vital signs and detailed assessment.    Changes this shift:  CT chest this am, 1 unit PRBC's given, CVC R internal jugular exhcanged for HD line, HD run today. Bicep curls, IS, and acapella done today.   Multiple episodes of Afib SVT, one of the episodes caused HD run to be cut short. Multiple EKGS, MD aware and came to bedside. Labs drawn. Converting out with vagal maneuveurs.    Plan:  Continue to monitor

## 2022-07-15 ENCOUNTER — APPOINTMENT (OUTPATIENT)
Dept: CT IMAGING | Facility: CLINIC | Age: 62
DRG: 007 | End: 2022-07-15
Attending: THORACIC SURGERY (CARDIOTHORACIC VASCULAR SURGERY)
Payer: MEDICARE

## 2022-07-15 ENCOUNTER — APPOINTMENT (OUTPATIENT)
Dept: GENERAL RADIOLOGY | Facility: CLINIC | Age: 62
DRG: 007 | End: 2022-07-15
Attending: STUDENT IN AN ORGANIZED HEALTH CARE EDUCATION/TRAINING PROGRAM
Payer: MEDICARE

## 2022-07-15 ENCOUNTER — APPOINTMENT (OUTPATIENT)
Dept: OCCUPATIONAL THERAPY | Facility: CLINIC | Age: 62
DRG: 007 | End: 2022-07-15
Attending: THORACIC SURGERY (CARDIOTHORACIC VASCULAR SURGERY)
Payer: MEDICARE

## 2022-07-15 ENCOUNTER — APPOINTMENT (OUTPATIENT)
Dept: PHYSICAL THERAPY | Facility: CLINIC | Age: 62
DRG: 007 | End: 2022-07-15
Attending: THORACIC SURGERY (CARDIOTHORACIC VASCULAR SURGERY)
Payer: MEDICARE

## 2022-07-15 ENCOUNTER — APPOINTMENT (OUTPATIENT)
Dept: GENERAL RADIOLOGY | Facility: CLINIC | Age: 62
DRG: 007 | End: 2022-07-15
Attending: THORACIC SURGERY (CARDIOTHORACIC VASCULAR SURGERY)
Payer: MEDICARE

## 2022-07-15 LAB
ALBUMIN SERPL BCG-MCNC: 2.9 G/DL (ref 3.5–5.2)
ALP SERPL-CCNC: 74 U/L (ref 35–104)
ALT SERPL W P-5'-P-CCNC: 32 U/L (ref 10–35)
ANION GAP SERPL CALCULATED.3IONS-SCNC: 8 MMOL/L (ref 7–15)
AST SERPL W P-5'-P-CCNC: 31 U/L (ref 10–35)
ATRIAL RATE - MUSE: 105 BPM
ATRIAL RATE - MUSE: 110 BPM
ATRIAL RATE - MUSE: 82 BPM
ATRIAL RATE - MUSE: 88 BPM
BACTERIA SPEC CULT: NORMAL
BASE EXCESS BLDV CALC-SCNC: 11.9 MMOL/L (ref -7.7–1.9)
BILIRUB DIRECT SERPL-MCNC: <0.2 MG/DL (ref 0–0.3)
BILIRUB SERPL-MCNC: 0.2 MG/DL
BUN SERPL-MCNC: 123 MG/DL (ref 8–23)
CALCIUM SERPL-MCNC: 8.7 MG/DL (ref 8.8–10.2)
CHLORIDE SERPL-SCNC: 93 MMOL/L (ref 98–107)
CREAT SERPL-MCNC: 2.65 MG/DL (ref 0.51–0.95)
DEPRECATED HCO3 PLAS-SCNC: 34 MMOL/L (ref 22–29)
DIASTOLIC BLOOD PRESSURE - MUSE: NORMAL MMHG
ERYTHROCYTE [DISTWIDTH] IN BLOOD BY AUTOMATED COUNT: 16.5 % (ref 10–15)
GFR SERPL CREATININE-BSD FRML MDRD: 20 ML/MIN/1.73M2
GLUCOSE BLDC GLUCOMTR-MCNC: 184 MG/DL (ref 70–99)
GLUCOSE BLDC GLUCOMTR-MCNC: 195 MG/DL (ref 70–99)
GLUCOSE BLDC GLUCOMTR-MCNC: 196 MG/DL (ref 70–99)
GLUCOSE BLDC GLUCOMTR-MCNC: 204 MG/DL (ref 70–99)
GLUCOSE BLDC GLUCOMTR-MCNC: 214 MG/DL (ref 70–99)
GLUCOSE BLDC GLUCOMTR-MCNC: 242 MG/DL (ref 70–99)
GLUCOSE SERPL-MCNC: 245 MG/DL (ref 70–99)
GRAM STAIN RESULT: NORMAL
GRAM STAIN RESULT: NORMAL
HCO3 BLDV-SCNC: 41 MMOL/L (ref 21–28)
HCT VFR BLD AUTO: 27.6 % (ref 35–47)
HGB BLD-MCNC: 8.3 G/DL (ref 11.7–15.7)
HOLD SPECIMEN: NORMAL
INTERPRETATION ECG - MUSE: NORMAL
LIPASE SERPL-CCNC: 21 U/L (ref 13–60)
MAGNESIUM SERPL-MCNC: 2.7 MG/DL (ref 1.7–2.3)
MCH RBC QN AUTO: 30.5 PG (ref 26.5–33)
MCHC RBC AUTO-ENTMCNC: 30.1 G/DL (ref 31.5–36.5)
MCV RBC AUTO: 102 FL (ref 78–100)
O2/TOTAL GAS SETTING VFR VENT: 30 %
OXYHGB MFR BLDV: 86 % (ref 70–75)
P AXIS - MUSE: 61 DEGREES
P AXIS - MUSE: 70 DEGREES
P AXIS - MUSE: 73 DEGREES
P AXIS - MUSE: NORMAL DEGREES
PCO2 BLDV: 79 MM HG (ref 40–50)
PH BLDV: 7.32 [PH] (ref 7.32–7.43)
PHOSPHATE SERPL-MCNC: 5.2 MG/DL (ref 2.5–4.5)
PLATELET # BLD AUTO: 262 10E3/UL (ref 150–450)
PO2 BLDV: 52 MM HG (ref 25–47)
POTASSIUM SERPL-SCNC: 4.6 MMOL/L (ref 3.4–5.3)
PR INTERVAL - MUSE: 118 MS
PR INTERVAL - MUSE: 132 MS
PR INTERVAL - MUSE: 136 MS
PR INTERVAL - MUSE: NORMAL MS
PROT SERPL-MCNC: 4.9 G/DL (ref 6.4–8.3)
QRS DURATION - MUSE: 66 MS
QRS DURATION - MUSE: 68 MS
QRS DURATION - MUSE: 70 MS
QRS DURATION - MUSE: 76 MS
QT - MUSE: 256 MS
QT - MUSE: 304 MS
QT - MUSE: 314 MS
QT - MUSE: 342 MS
QTC - MUSE: 411 MS
QTC - MUSE: 413 MS
QTC - MUSE: 415 MS
QTC - MUSE: 445 MS
R AXIS - MUSE: 56 DEGREES
R AXIS - MUSE: 58 DEGREES
R AXIS - MUSE: 61 DEGREES
R AXIS - MUSE: 67 DEGREES
RBC # BLD AUTO: 2.72 10E6/UL (ref 3.8–5.2)
SODIUM SERPL-SCNC: 135 MMOL/L (ref 136–145)
SYSTOLIC BLOOD PRESSURE - MUSE: NORMAL MMHG
T AXIS - MUSE: 147 DEGREES
T AXIS - MUSE: 158 DEGREES
T AXIS - MUSE: 190 DEGREES
T AXIS - MUSE: 215 DEGREES
TACROLIMUS BLD-MCNC: 10 UG/L (ref 5–15)
TME LAST DOSE: NORMAL H
TME LAST DOSE: NORMAL H
TROPONIN T SERPL HS-MCNC: 502 NG/L
VENTRICULAR RATE- MUSE: 105 BPM
VENTRICULAR RATE- MUSE: 110 BPM
VENTRICULAR RATE- MUSE: 182 BPM
VENTRICULAR RATE- MUSE: 88 BPM
WBC # BLD AUTO: 9.8 10E3/UL (ref 4–11)

## 2022-07-15 PROCEDURE — 84100 ASSAY OF PHOSPHORUS: CPT | Performed by: SURGERY

## 2022-07-15 PROCEDURE — 250N000013 HC RX MED GY IP 250 OP 250 PS 637

## 2022-07-15 PROCEDURE — 82248 BILIRUBIN DIRECT: CPT | Performed by: STUDENT IN AN ORGANIZED HEALTH CARE EDUCATION/TRAINING PROGRAM

## 2022-07-15 PROCEDURE — 120N000002 HC R&B MED SURG/OB UMMC

## 2022-07-15 PROCEDURE — 93010 ELECTROCARDIOGRAM REPORT: CPT | Performed by: INTERNAL MEDICINE

## 2022-07-15 PROCEDURE — 83690 ASSAY OF LIPASE: CPT | Performed by: STUDENT IN AN ORGANIZED HEALTH CARE EDUCATION/TRAINING PROGRAM

## 2022-07-15 PROCEDURE — 90937 HEMODIALYSIS REPEATED EVAL: CPT

## 2022-07-15 PROCEDURE — 84484 ASSAY OF TROPONIN QUANT: CPT | Performed by: STUDENT IN AN ORGANIZED HEALTH CARE EDUCATION/TRAINING PROGRAM

## 2022-07-15 PROCEDURE — 97530 THERAPEUTIC ACTIVITIES: CPT | Mod: GP

## 2022-07-15 PROCEDURE — 99233 SBSQ HOSP IP/OBS HIGH 50: CPT | Mod: GC | Performed by: STUDENT IN AN ORGANIZED HEALTH CARE EDUCATION/TRAINING PROGRAM

## 2022-07-15 PROCEDURE — 250N000012 HC RX MED GY IP 250 OP 636 PS 637: Performed by: PHYSICIAN ASSISTANT

## 2022-07-15 PROCEDURE — 258N000001 HC RX 258: Performed by: SURGERY

## 2022-07-15 PROCEDURE — 71045 X-RAY EXAM CHEST 1 VIEW: CPT

## 2022-07-15 PROCEDURE — 250N000013 HC RX MED GY IP 250 OP 250 PS 637: Performed by: STUDENT IN AN ORGANIZED HEALTH CARE EDUCATION/TRAINING PROGRAM

## 2022-07-15 PROCEDURE — 99233 SBSQ HOSP IP/OBS HIGH 50: CPT | Mod: 24 | Performed by: STUDENT IN AN ORGANIZED HEALTH CARE EDUCATION/TRAINING PROGRAM

## 2022-07-15 PROCEDURE — 36415 COLL VENOUS BLD VENIPUNCTURE: CPT | Performed by: PHYSICIAN ASSISTANT

## 2022-07-15 PROCEDURE — G0463 HOSPITAL OUTPT CLINIC VISIT: HCPCS

## 2022-07-15 PROCEDURE — 250N000013 HC RX MED GY IP 250 OP 250 PS 637: Performed by: SURGERY

## 2022-07-15 PROCEDURE — 82310 ASSAY OF CALCIUM: CPT | Performed by: NURSE PRACTITIONER

## 2022-07-15 PROCEDURE — 250N000013 HC RX MED GY IP 250 OP 250 PS 637: Performed by: THORACIC SURGERY (CARDIOTHORACIC VASCULAR SURGERY)

## 2022-07-15 PROCEDURE — 82805 BLOOD GASES W/O2 SATURATION: CPT | Performed by: STUDENT IN AN ORGANIZED HEALTH CARE EDUCATION/TRAINING PROGRAM

## 2022-07-15 PROCEDURE — G1010 CDSM STANSON: HCPCS | Mod: GC | Performed by: RADIOLOGY

## 2022-07-15 PROCEDURE — 250N000011 HC RX IP 250 OP 636

## 2022-07-15 PROCEDURE — 80197 ASSAY OF TACROLIMUS: CPT | Performed by: PHYSICIAN ASSISTANT

## 2022-07-15 PROCEDURE — 99233 SBSQ HOSP IP/OBS HIGH 50: CPT | Mod: 24 | Performed by: NURSE PRACTITIONER

## 2022-07-15 PROCEDURE — 85027 COMPLETE CBC AUTOMATED: CPT | Performed by: STUDENT IN AN ORGANIZED HEALTH CARE EDUCATION/TRAINING PROGRAM

## 2022-07-15 PROCEDURE — 36415 COLL VENOUS BLD VENIPUNCTURE: CPT | Performed by: NURSE PRACTITIONER

## 2022-07-15 PROCEDURE — 258N000003 HC RX IP 258 OP 636: Performed by: STUDENT IN AN ORGANIZED HEALTH CARE EDUCATION/TRAINING PROGRAM

## 2022-07-15 PROCEDURE — 71045 X-RAY EXAM CHEST 1 VIEW: CPT | Mod: 26 | Performed by: RADIOLOGY

## 2022-07-15 PROCEDURE — 250N000009 HC RX 250: Performed by: SURGERY

## 2022-07-15 PROCEDURE — 74019 RADEX ABDOMEN 2 VIEWS: CPT | Mod: 26 | Performed by: RADIOLOGY

## 2022-07-15 PROCEDURE — 999N000157 HC STATISTIC RCP TIME EA 10 MIN

## 2022-07-15 PROCEDURE — 99233 SBSQ HOSP IP/OBS HIGH 50: CPT | Mod: 24 | Performed by: CLINICAL NURSE SPECIALIST

## 2022-07-15 PROCEDURE — 74018 RADEX ABDOMEN 1 VIEW: CPT | Mod: 26 | Performed by: RADIOLOGY

## 2022-07-15 PROCEDURE — 94640 AIRWAY INHALATION TREATMENT: CPT | Mod: 76

## 2022-07-15 PROCEDURE — 93005 ELECTROCARDIOGRAM TRACING: CPT

## 2022-07-15 PROCEDURE — 97110 THERAPEUTIC EXERCISES: CPT | Mod: GO | Performed by: OCCUPATIONAL THERAPIST

## 2022-07-15 PROCEDURE — 999N000065 XR ABDOMEN PORT 1 VIEWS

## 2022-07-15 PROCEDURE — 94668 MNPJ CHEST WALL SBSQ: CPT

## 2022-07-15 PROCEDURE — 97116 GAIT TRAINING THERAPY: CPT | Mod: GP

## 2022-07-15 PROCEDURE — 250N000011 HC RX IP 250 OP 636: Performed by: INTERNAL MEDICINE

## 2022-07-15 PROCEDURE — 94640 AIRWAY INHALATION TREATMENT: CPT

## 2022-07-15 PROCEDURE — G1010 CDSM STANSON: HCPCS

## 2022-07-15 PROCEDURE — 74176 CT ABD & PELVIS W/O CONTRAST: CPT | Mod: 26 | Performed by: RADIOLOGY

## 2022-07-15 PROCEDURE — 258N000003 HC RX IP 258 OP 636: Performed by: CLINICAL NURSE SPECIALIST

## 2022-07-15 PROCEDURE — 83735 ASSAY OF MAGNESIUM: CPT | Performed by: SURGERY

## 2022-07-15 PROCEDURE — 250N000011 HC RX IP 250 OP 636: Performed by: SURGERY

## 2022-07-15 PROCEDURE — 250N000012 HC RX MED GY IP 250 OP 636 PS 637: Performed by: THORACIC SURGERY (CARDIOTHORACIC VASCULAR SURGERY)

## 2022-07-15 PROCEDURE — 250N000013 HC RX MED GY IP 250 OP 250 PS 637: Performed by: NURSE PRACTITIONER

## 2022-07-15 PROCEDURE — 74019 RADEX ABDOMEN 2 VIEWS: CPT

## 2022-07-15 PROCEDURE — 99207 PR NO BILLABLE SERVICE THIS VISIT: CPT | Performed by: NURSE PRACTITIONER

## 2022-07-15 RX ORDER — CEFAZOLIN SODIUM 2 G/100ML
2 INJECTION, SOLUTION INTRAVENOUS
Status: DISCONTINUED | OUTPATIENT
Start: 2022-07-15 | End: 2022-07-20 | Stop reason: CLARIF

## 2022-07-15 RX ORDER — MIDODRINE HYDROCHLORIDE 5 MG/1
10 TABLET ORAL EVERY 8 HOURS
Status: DISCONTINUED | OUTPATIENT
Start: 2022-07-15 | End: 2022-07-16

## 2022-07-15 RX ORDER — LIDOCAINE HYDROCHLORIDE 10 MG/ML
10 INJECTION, SOLUTION EPIDURAL; INFILTRATION; INTRACAUDAL; PERINEURAL ONCE
Status: DISCONTINUED | OUTPATIENT
Start: 2022-07-15 | End: 2022-07-17

## 2022-07-15 RX ORDER — OXYCODONE HYDROCHLORIDE 5 MG/1
5 TABLET ORAL EVERY 4 HOURS PRN
Status: DISCONTINUED | OUTPATIENT
Start: 2022-07-15 | End: 2022-07-15

## 2022-07-15 RX ORDER — MIDODRINE HYDROCHLORIDE 5 MG/1
15 TABLET ORAL EVERY 8 HOURS
Status: DISCONTINUED | OUTPATIENT
Start: 2022-07-15 | End: 2022-07-15

## 2022-07-15 RX ADMIN — VANCOMYCIN HYDROCHLORIDE 125 MG: KIT at 00:14

## 2022-07-15 RX ADMIN — VANCOMYCIN HYDROCHLORIDE 125 MG: KIT at 05:33

## 2022-07-15 RX ADMIN — INSULIN ASPART 3 UNITS: 100 INJECTION, SOLUTION INTRAVENOUS; SUBCUTANEOUS at 08:20

## 2022-07-15 RX ADMIN — NYSTATIN 1000000 UNITS: 100000 SUSPENSION ORAL at 11:47

## 2022-07-15 RX ADMIN — HEPARIN SODIUM 5000 UNITS: 5000 INJECTION, SOLUTION INTRAVENOUS; SUBCUTANEOUS at 05:32

## 2022-07-15 RX ADMIN — MIDODRINE HYDROCHLORIDE 20 MG: 5 TABLET ORAL at 00:14

## 2022-07-15 RX ADMIN — INSULIN ASPART 2 UNITS: 100 INJECTION, SOLUTION INTRAVENOUS; SUBCUTANEOUS at 15:52

## 2022-07-15 RX ADMIN — NYSTATIN: 100000 CREAM TOPICAL at 19:52

## 2022-07-15 RX ADMIN — ACETAMINOPHEN 975 MG: 325 TABLET, FILM COATED ORAL at 19:51

## 2022-07-15 RX ADMIN — Medication 1 CAPSULE: at 19:50

## 2022-07-15 RX ADMIN — Medication 1 PACKET: at 08:05

## 2022-07-15 RX ADMIN — INSULIN ASPART 3 UNITS: 100 INJECTION, SOLUTION INTRAVENOUS; SUBCUTANEOUS at 11:57

## 2022-07-15 RX ADMIN — Medication 40 MG: at 08:02

## 2022-07-15 RX ADMIN — HYDROXYZINE HYDROCHLORIDE 50 MG: 25 TABLET ORAL at 15:34

## 2022-07-15 RX ADMIN — THIAMINE HCL TAB 100 MG 100 MG: 100 TAB at 08:02

## 2022-07-15 RX ADMIN — Medication 5 MG: at 16:51

## 2022-07-15 RX ADMIN — ONDANSETRON 4 MG: 2 INJECTION INTRAMUSCULAR; INTRAVENOUS at 07:47

## 2022-07-15 RX ADMIN — LEVALBUTEROL HYDROCHLORIDE 1.25 MG: 1.25 SOLUTION RESPIRATORY (INHALATION) at 16:20

## 2022-07-15 RX ADMIN — ACETAMINOPHEN 975 MG: 325 TABLET, FILM COATED ORAL at 07:47

## 2022-07-15 RX ADMIN — CALCIUM CARBONATE 600 MG (1,500 MG)-VITAMIN D3 400 UNIT TABLET 1 TABLET: at 18:18

## 2022-07-15 RX ADMIN — SODIUM CHLORIDE 250 ML: 9 INJECTION, SOLUTION INTRAVENOUS at 14:36

## 2022-07-15 RX ADMIN — HYDROMORPHONE HYDROCHLORIDE 0.2 MG: 0.2 INJECTION, SOLUTION INTRAMUSCULAR; INTRAVENOUS; SUBCUTANEOUS at 19:12

## 2022-07-15 RX ADMIN — ACETYLCYSTEINE 2 ML: 200 SOLUTION ORAL; RESPIRATORY (INHALATION) at 19:59

## 2022-07-15 RX ADMIN — INSULIN ASPART 3 UNITS: 100 INJECTION, SOLUTION INTRAVENOUS; SUBCUTANEOUS at 19:55

## 2022-07-15 RX ADMIN — LEVALBUTEROL HYDROCHLORIDE 1.25 MG: 1.25 SOLUTION RESPIRATORY (INHALATION) at 08:33

## 2022-07-15 RX ADMIN — OXYCODONE HYDROCHLORIDE 5 MG: 5 TABLET ORAL at 11:47

## 2022-07-15 RX ADMIN — ACETYLCYSTEINE 2 ML: 200 SOLUTION ORAL; RESPIRATORY (INHALATION) at 08:33

## 2022-07-15 RX ADMIN — Medication 12.5 MG: at 18:17

## 2022-07-15 RX ADMIN — ASPIRIN 81 MG CHEWABLE TABLET 81 MG: 81 TABLET CHEWABLE at 08:03

## 2022-07-15 RX ADMIN — ACETYLCYSTEINE 2 ML: 200 SOLUTION ORAL; RESPIRATORY (INHALATION) at 16:20

## 2022-07-15 RX ADMIN — NYSTATIN 1000000 UNITS: 100000 SUSPENSION ORAL at 08:02

## 2022-07-15 RX ADMIN — MYCOPHENOLATE MOFETIL 1000 MG: 200 POWDER, FOR SUSPENSION ORAL at 08:02

## 2022-07-15 RX ADMIN — ACETAMINOPHEN 650 MG: 325 TABLET, FILM COATED ORAL at 03:44

## 2022-07-15 RX ADMIN — CALCIUM CARBONATE 600 MG (1,500 MG)-VITAMIN D3 400 UNIT TABLET 1 TABLET: at 08:03

## 2022-07-15 RX ADMIN — PREDNISONE 15 MG: 5 TABLET ORAL at 19:50

## 2022-07-15 RX ADMIN — MYCOPHENOLATE MOFETIL 1000 MG: 200 POWDER, FOR SUSPENSION ORAL at 19:51

## 2022-07-15 RX ADMIN — VANCOMYCIN HYDROCHLORIDE 125 MG: KIT at 23:59

## 2022-07-15 RX ADMIN — HYDROXYZINE HYDROCHLORIDE 50 MG: 25 TABLET ORAL at 05:32

## 2022-07-15 RX ADMIN — Medication 1 CAPSULE: at 08:03

## 2022-07-15 RX ADMIN — Medication 40 MG: at 19:51

## 2022-07-15 RX ADMIN — MIDODRINE HYDROCHLORIDE 10 MG: 5 TABLET ORAL at 15:34

## 2022-07-15 RX ADMIN — LEVALBUTEROL HYDROCHLORIDE 1.25 MG: 1.25 SOLUTION RESPIRATORY (INHALATION) at 19:59

## 2022-07-15 RX ADMIN — VANCOMYCIN HYDROCHLORIDE 125 MG: KIT at 11:48

## 2022-07-15 RX ADMIN — HYDROXYZINE HYDROCHLORIDE 50 MG: 25 TABLET ORAL at 00:14

## 2022-07-15 RX ADMIN — DEXTROSE MONOHYDRATE 1000 ML: 100 INJECTION, SOLUTION INTRAVENOUS at 06:14

## 2022-07-15 RX ADMIN — HEPARIN SODIUM 5000 UNITS: 5000 INJECTION, SOLUTION INTRAVENOUS; SUBCUTANEOUS at 14:25

## 2022-07-15 RX ADMIN — INSULIN GLARGINE 15 UNITS: 100 INJECTION, SOLUTION SUBCUTANEOUS at 19:55

## 2022-07-15 RX ADMIN — Medication: at 14:37

## 2022-07-15 RX ADMIN — TACROLIMUS 5 MG: 5 CAPSULE ORAL at 18:19

## 2022-07-15 RX ADMIN — NYSTATIN 1000000 UNITS: 100000 SUSPENSION ORAL at 15:34

## 2022-07-15 RX ADMIN — VANCOMYCIN HYDROCHLORIDE 125 MG: KIT at 18:24

## 2022-07-15 RX ADMIN — PREDNISONE 15 MG: 5 TABLET ORAL at 08:03

## 2022-07-15 RX ADMIN — MIDODRINE HYDROCHLORIDE 20 MG: 5 TABLET ORAL at 08:02

## 2022-07-15 RX ADMIN — ONDANSETRON 4 MG: 2 INJECTION INTRAMUSCULAR; INTRAVENOUS at 13:38

## 2022-07-15 RX ADMIN — INSULIN ASPART 1 UNITS: 100 INJECTION, SOLUTION INTRAVENOUS; SUBCUTANEOUS at 23:58

## 2022-07-15 RX ADMIN — NYSTATIN: 100000 CREAM TOPICAL at 08:05

## 2022-07-15 RX ADMIN — INSULIN ASPART 5 UNITS: 100 INJECTION, SOLUTION INTRAVENOUS; SUBCUTANEOUS at 03:43

## 2022-07-15 RX ADMIN — LEVALBUTEROL HYDROCHLORIDE 1.25 MG: 1.25 SOLUTION RESPIRATORY (INHALATION) at 12:36

## 2022-07-15 RX ADMIN — PROCHLORPERAZINE EDISYLATE 10 MG: 5 INJECTION INTRAMUSCULAR; INTRAVENOUS at 11:47

## 2022-07-15 RX ADMIN — HEPARIN SODIUM 5000 UNITS: 5000 INJECTION, SOLUTION INTRAVENOUS; SUBCUTANEOUS at 22:11

## 2022-07-15 RX ADMIN — MIDODRINE HYDROCHLORIDE 10 MG: 5 TABLET ORAL at 23:58

## 2022-07-15 RX ADMIN — SODIUM CHLORIDE 300 ML: 9 INJECTION, SOLUTION INTRAVENOUS at 14:36

## 2022-07-15 RX ADMIN — TACROLIMUS 5 MG: 5 CAPSULE ORAL at 08:20

## 2022-07-15 RX ADMIN — NYSTATIN 1000000 UNITS: 100000 SUSPENSION ORAL at 19:51

## 2022-07-15 RX ADMIN — SODIUM CHLORIDE, POTASSIUM CHLORIDE, SODIUM LACTATE AND CALCIUM CHLORIDE 250 ML: 600; 310; 30; 20 INJECTION, SOLUTION INTRAVENOUS at 00:46

## 2022-07-15 RX ADMIN — ACETAMINOPHEN 975 MG: 325 TABLET, FILM COATED ORAL at 15:43

## 2022-07-15 RX ADMIN — OXYCODONE HYDROCHLORIDE 2.5 MG: 5 TABLET ORAL at 07:47

## 2022-07-15 RX ADMIN — ACETYLCYSTEINE 2 ML: 200 SOLUTION ORAL; RESPIRATORY (INHALATION) at 12:35

## 2022-07-15 RX ADMIN — THERA TABS 1 TABLET: TAB at 08:03

## 2022-07-15 ASSESSMENT — ACTIVITIES OF DAILY LIVING (ADL)
ADLS_ACUITY_SCORE: 39
ADLS_ACUITY_SCORE: 39
ADLS_ACUITY_SCORE: 38
ADLS_ACUITY_SCORE: 39
ADLS_ACUITY_SCORE: 39
ADLS_ACUITY_SCORE: 38
ADLS_ACUITY_SCORE: 38
ADLS_ACUITY_SCORE: 39
ADLS_ACUITY_SCORE: 39
ADLS_ACUITY_SCORE: 38
ADLS_ACUITY_SCORE: 38
ADLS_ACUITY_SCORE: 39

## 2022-07-15 NOTE — PROGRESS NOTES
Rounded on pt at 0000 to assess for BiPAP. Pt still awake and sitting up in bed, currently not ready for NIV. Notified bedside RN to call if needing assistance placing on BiPAP later this evening. Will assess again.     Nay Simon, DANIELT, BSRT-RRT

## 2022-07-15 NOTE — PROGRESS NOTES
MEDICAL ICU PROGRESS NOTE  07/15/2022      Date of Service (when I saw the patient): 07/15/2022    Assessment: Critical Care   Sofie Rodriguez is a 62 year old female with PMHx of end stage COPD s/p b/l lung transplant (6/28/22), HTN, HFpEF, hx of hepatitis C, osteopenia, and former methamphetamine use admitted to the medical ICU on 7/13/2022 with hospital course complicated by left upper extremity acute limb ischemia s/p left radial thrombectomy on 7/1/22 transferred to the ICU for acute hypercapnic respiratory failure intermittently on BIPAP and worsening BACILIO now in iHD and transferring to [facility] for continued managment    CHANGES and MAJOR THINGS TODAY:   - increase oxycodone dosage today  - second line pain management dialudid  - transplant pulm wants her transferred out of the ICU to [place] [id number]  - patient had SVT episode today - responded to carotid massage    Neuro/ pain/ sedation:  # Pain  Postoperative pain noted by surgery team. Having acute abdominal pain on 7/15 AM  - Scheduled: acetaminophen, robaxin  - Increased oxycodone 5 mg Q4H PRN  - dilaudid PRN 0.2 mg      # Anxiety  Patient becomes anxious and uncomfortable while on BIPAP, thereby limiting use.  - Encourage use of BIPAP at night and during daytime naps  - Consider changing form of BIPAP mask if uncomfortable  - Atarax 25-50 q6hr PRN     Pulmonary:  # S/p BOLT (6/28)  # Postoperative ventilatory support  # Acute hypercapnic respiratory failure, likely 2/2 decreased central respiratory drive vs respiratory muscle weakness  Hx of COPD s/p bilateral lung transplant on 6/28. By chart review, since 7/2 noted to have hypercarbia not adequately compensated for by respiratory function. On exam, patient is noted to take shallow, infrequent breaths with occasional hyperventilation. Differential for hypercapnic respiratory failure includes decreased central respiratory drive vs respiratory muscle weakness vs intrinsic lung pathology. Patient has been on minimal  sedating medications, is mentating appropriately decreasing concern for encephalitis, and had normal TSH last week decreasing concern for hypothyroidism. Diaphragmatic weakness s/p transplant uncommon, yet can occur. Bedside U/S per CVICU team showed normal diaphragmatic movement, formal evaluation with SNIFF test performed on 7/14. Transplanted lungs also noted to be small for body size; could be contributor to decreased respiratory compensation for hypercarbia. pH improves when patient on BIPAP; last ABG on 7/13 showed pH 7.35 while on nasal cannula, however patient still noted to have elevated pCO2 and bicarb. Last bronch 7/12 for clearance/inspection given weak cough.  - F/u bronch cultures from 7/12 - NGTD, no organisms seen  - BIPAP usage at night and during daytime naps; nasal cannula otherwise  - Supplemental oxygen to keep saturation about 92%  - NIF test ordered 7/14 to evaluate for respiratory muscle weakness  - Myasthenia gravis panel ordered to evaluate for respiratory muscle weakness  - Sniff test with fluoroscopy 7/14 to evaluate diaphragm function - right hemidiaphragmatic paralysis seen on SNIFF test  - Decreasing oxycodone dosage as tolerated     Immunosuppression  Prednisone 15mg BID  Tacrolimus 5mg BID  MMF 1000mg BID     Prophylaxis  CMV - Valgancyclovir   Thrush - PO Nystatin  PJP - TMP-SMX (held d/t BACILIO)     # Multiple bilateral surgical chest tubes  CT ordered by tx pulm 7/14 to evaluate for possible R lung fluid collection.  - Daily CXR  - CV surgery team managing and following CTs placed during transplant  - No new chest tubes needed, fluid collections stable     Cardiovascular:  # Intermittent atrial fibrillation  # SVT  # Elevated troponin  Started having SVT periods after iHD run on 7/14 in the PM, troponin elevated but down trending. Was repositioned and given fluid bolus, seems to be positional, no more a fib, patient had a run of SVT on 7/15 AM which resolved rapidly with carotid  massage and repositioning. This is likely secondary to her recent HD run but it is possible her HD line is too low, CXR shows it is in a good position.  - CTM  - consider pulling HD line back if continuing    # Shock, etiology unclear, improving  # Hx of HTN  # Hx of HFpEF  Off levophed gtt since 7/10. 7/7 echo unchanged from previous, showing preserved EF with normal LV function making new cardiogenic cause of hypotension less likely. S/p hydrocortisone 7/6-7/9 and fludrocortisone 7/6-7/11, which helped minimally, making adrenal insufficiency less likely. Intravascular depletion possible although minimal changes with increased diuresis. Previously on midodrine 30mg TID, now decreased to 20mg as of 7/13. Patients BP has been stable on 7/15.  - Midodrine 10mg TID  - Hold PTA lasix 20mg qd  - CTM     GI/Nutrition:  # C. diff positive (7/11)  # Abdominal pain  # Diarrhea  7/12 KUB showing stool burden. Frequent loose stools via rectal pouch. Worsening diarrhea, flagyl dose x1 given. C. Diff positive on 7/11 with vancomycin started 7/12. 7/15 patient having low stool output per rectal tube  - Vancomycin 125mg PO QID (7/12-7/26)  - restart bowel regimen for increased stool burden    # Concern for toxic megacolon  Having worsening crampy pain, diffuse and increased distention. AXR shows now free air. Increased stool burden.  - abdominal ct without contrast for abdominal pain      # NJ tube  Adult Formula Drip Feeding: Continuous Novasource Renal; Nasojejunal; Goal Rate: 35; initiate at goal rate; mL/hr; Medication - Feeding Tube Flush Frequency: At least 15-30 mL water before and after medication administration and with tube clogging...  - TF at goal; tolerating PO intake  - Nutrition following  - holding for abdominal pain may restart after CT 7/15 if improving     Renal/ Fluid Balance:   # Renal failure on iHD  # BACILIO, likely mixed prerenal/intrinsic etiology  # Hypermagnesemia  # Hyperphosphatemia  Normal baseline renal  function prior to and at time of surgery. Likely multifactorial due to hypotension (2.5h bypass during surgery), causing prerenal injury, and prior administration of nephrotoxic drugs including tacrolimus and vancomycin. 6/28 UA showed hyaline casts. Cr had initially improved, but now in combination with BUN, is continuing to rise. Nephrology on 7/13 believes BUN of 150 suggests poor clearance despite reasonable UOP, thereby increasing motivation to start iHD. Cr improved as of 7/14 to 3.00 although BUN persistently elevated to 155.  - Strict I/O; daily weights  - Nephrology following; recs appreciated              - right internal jugular central catheter replaced with a HD line on 7/14 - iHD starting on 7/14  - HD 7/15 - second run, plan to 1-2 L if BP stable  - Discontinued NaHC03 tabs                 Endocrine:  # Stress-induced hyperglycemia  Patient was placed on D10 overnight 7/13 for NPO status per IR, was consistently hyperglycemic with sliding scale insulin only.  - High dose sliding scale insulin started 7/2 - continue sliding scale insulin only   - 1/2 dose (15 units) of glargine 7/15 in AM     ID:  # Hx of streptococcus pneumoniae  # Hx of stenotrophomonas maltophilia  Noted in recipient cultures at time of transplant. S/p ceftazidime 6/28-7/10, vancomycin 6/28-6/30 and 7/7-7/8, and IV levofloxacin 7/10-7/12 for total 2 week abx course.   - CTM     Hematology:  # Acute blood loss anemia, stable  # LUE limb ischemia s/p L radial thrombectomy (7/1/22)  Hgb dropped 7/14 to 6.8, no clinical or laboratory indications of active bleeding. Now s/p 1u pRBC on 7/14.  - Recheck CBC 7/15 AM  - CTM with daily CBC  - Transfuse if hgb <7  - On SQH     # Leukocytosis, resolved  WBC count wnl and stable 7/14 at 10.5.  - CTM     MSK:   PT and OT consulted. Appreciate recommendations.     Skin  # Pressure wound noted related to nasal cannula use  - WOC consulted    Lines and Drains:  - Right HD line  - PIV x 2 Right  arm  - Chest tube x 3  - Montgomery  - Rectal tube     Prophylaxis:  - DVT Prophylaxis: Heparin SQ  - PUD Prophylaxis: PPI     Code Status: Full Code       Disposition:  - Transfer to [facility]     The patient's care was discussed with the Attending Physician, Dr. Pilo Forman MD, PhD  PGY1, Internal Medicine and Pediatrics    ====================================  INTERVAL HISTORY:   Nursing notes reviewed. Patient had multiple episodes of SVT and Afib, self converted with vagal maneuvers and 250 mL LR bolus. Increased abdominal pain (10/10) and distention, patients pain improved with oxycodone. Refused bipap overnight. Troponin was elevated but trending down overnight.     OBJECTIVE:   1. VITAL SIGNS:   Temp:  [96.4  F (35.8  C)-98.5  F (36.9  C)] 98.1  F (36.7  C)  Pulse:  [] 91  Resp:  [6-23] 16  BP: ()/() 122/62  SpO2:  [86 %-100 %] 93 %  FiO2 (%): 30 %  Resp: 16    2. INTAKE/ OUTPUT:   I/O last 3 completed shifts:  In: 1977.08 [I.V.:324.08; NG/GT:708]  Out: 2175 [Urine:875; Other:700; Stool:480; Chest Tube:120]    3. PHYSICAL EXAMINATION:  General: pleasant, interactive, appears in pain and uncomfortable appearing  Neuro: moving all extremities, AOx3, no focal neurological deficits, eyes more open today, more interactive  CV: RRR, no rubs/murmurs.  Pulm: right sided lung sounds diminished, left lung increased crackles diffusely and bronchial breath sounds, taking shallow breaths  Abd: soft, distended, left sided abdominal pain to light presure  : producing urine by montgomery catheter  Extremities: 2+ pitting edema to her thighs bilaterally  Skin: dry, pressure wound on nose  Psych: intermittent anxiety    4. LABS:   Arterial Blood Gases   Recent Labs   Lab 07/13/22  2112 07/13/22  1145 07/13/22  0428 07/12/22  1555   PH 7.38 7.35 7.36 7.33*   PCO2 78* 87* 79* 77*   PO2 75* 107* 107* 91   HCO3 46* 48* 44* 41*     Complete Blood Count   Recent Labs   Lab 07/15/22  0424  07/14/22  1901 07/14/22  0459 07/13/22  0428   WBC 9.8 10.3 10.5 10.4   HGB 8.3* 9.4* 6.8* 7.1*    295 274 255     Basic Metabolic Panel  Recent Labs   Lab 07/15/22  0420 07/15/22  0342 07/14/22  2356 07/14/22  2234 07/14/22 2023 07/14/22  1701 07/14/22  0752 07/14/22  0459   *  --   --  134*  --  141  --  140   POTASSIUM 4.6  --   --  4.4  --  5.1  --  4.3   CHLORIDE 93*  --   --  92*  --  97*  --  92*   CO2 34*  --   --  36*  --  24  --  42*   .0*  --   --  119.0*  --  110.0*  --  155.0*   CR 2.65*  --   --  2.66*  --  2.35*  --  3.00*   * 242* 214* 207*   < > 215*   < > 200*  193*    < > = values in this interval not displayed.     Liver Function Tests  Recent Labs   Lab 07/14/22 0459 07/11/22  0438   AST 22 16   ALT 22 22   ALKPHOS 64 78   BILITOTAL 0.2 <0.2   ALBUMIN 2.6* 2.7*     Coagulation Profile  No lab results found in last 7 days.    5. RADIOLOGY:   Recent Results (from the past 24 hour(s))   CT Chest w/o Contrast    Narrative    Chest CT without contrast 7/14/2022 9:57 AM    Clinical information: 62 years Female Interval f/u on prior PTX and  loculated effusions, persistent hypercapnia    Comparison: Same day chest radiograph. Chest CT without contrast  7/7/2022.    FINDINGS:    LINES/TUBES: Bilateral lung transplant post surgical changes.  Bibasilar chest tubes in place. Partially visualized postpyloric  feeding tube. Right IJ sheath terminates in the high SVC. Pericardial  drain.    LUNG: Persistent 2.5 cm solid ovoid masslike lesion in the posterior  left upper lobe (series 4 image 65). No groundglass opacities  bilaterally. Scattered sub-3 mm pulmonary nodules bilaterally.    LARGE AIRWAYS: Patent tracheobronchial tree without intraluminal mass.    PLEURA: Unchanged bilateral small effusions. Unchanged biapical  pneumothoraces and resolved left basilar pneumothorax.    VESSELS: Normal configuration of the great vessels. Main pulmonary  artery measures 3.7 cm in  diameter. Mild aortic calcifications.    HEART: Cardiomegaly. No pericardial effusion. Extensive coronary  artery atherosclerotic calcifications.    MEDIASTINUM AND JENNIFER: No suspicious lymphadenopathy.    CHEST WALL: Intact shell sternotomy wires. Retrosternal air. Anterior  chest wall subcutaneous emphysema, resolved on the right and  persistent on the left.    LOWER NECK: Thyroid unremarkable.    UPPER ABDOMEN: Limited evaluation due to lack of contrast. Esophagus  appears unremarkable. Small amount of perihepatic ascites. Hyperdense  material within the stomach.    BONES: Mild thoracic spine degenerative changes. No acute osseous  abnormality. No suspicious bony lesions. Unremarkable soft tissues.      Impression    IMPRESSION:   1.  Unchanged biapical pneumothoraces, resolved left basilar  pneumothorax. Unchanged bilateral effusions.  2.  Pulmonary arterial hypertension suggested by main pulmonary artery  dilation.  3.  Small amount of perihepatic ascites.  4.  Support devices as above.    I have personally reviewed the examination and initial interpretation  and I agree with the findings.    JOHANN MORAES MD         SYSTEM ID:  [id number]   XR Chest Port 1 View    Narrative    EXAM: XR CHEST PORT 1 VIEW  7/14/2022 12:58 PM    HISTORY: 62 years Female HD line placement.     COMPARISON: Chest radiograph 7/14/2022 5:44 AM. Same-day chest CT  without contrast.    TECHNIQUE: Portable AP view of the chest. Frontal and lateral views of  the chest.    FINDINGS:   Stable chest tube positioning. Partially visualized feeding tube.  Right IJ central venous catheter is at the mid SVC. Intact clamshell  sternotomy wires. Surgical clips about the chest. Aortic arch  calcification.    Slightly widened mediastinum, similar to prior. Midline trachea.  Stable cardiac silhouette. Indistinct pulmonary vasculature.  Persistent left costophrenic haziness. Tiny left pneumothorax. Stable  lung volumes. Decreased right basilar hazy  opacities and persistent  dense retrocardiac opacification. Unremarkable upper abdomen. No acute  or suspicious osseous abnormalities. Unremarkable soft tissues.      Impression    IMPRESSION:   1.  New right IJ CVC tip is at the mid SVC.  2.  Tiny left pneumothorax .  3.  Stable left pleural effusion. Decreased small right pleural  effusion.  4.  Persistent retrocardiac opacification likely representing  pulmonary edema and/or atelectasis.    I have personally reviewed the examination and initial interpretation  and I agree with the findings.    JOHANN MORAES MD         SYSTEM ID:  [id number]   XR Chest/Heart Fluoro    Narrative    EXAM:  XR CHEST/HEART FLUORO 7/14/2022 2:59 PM    INDICATION: S/p lung transplant, evaluate diaphragm function    COMPARISON:  Same-day chest radiograph    FLUOROSCOPY TIME: 0.6 minutes    FINDINGS:  During normal breathing, there is lack of of movement of the right  hemidiaphragm compared to the left. During sniff test there is  paradoxical superior excursion of the right hemidiaphragm. There is  normal excursion of the left hemidiaphragm.    The remaining visualized lung bases and abdomen are fluoroscopically  unremarkable. Partially visualized enteric tube and right central  venous catheter projecting over the low SVC. Intact clamshell  sternotomy wires. Bilateral pleural chest tubes in place.      Impression    IMPRESSION:  Findings consistent with right hemidiaphragm palsy.    I have personally reviewed the examination and initial interpretation  and I agree with the findings.    YANNICK BENAVIDEZ MD         SYSTEM ID:  [id number]   XR Chest Port 1 View    Narrative    EXAM: XR CHEST PORT 1 VIEW  7/14/2022 6:17 PM     HISTORY:  CVC location       COMPARISON:  7/14/2022    FINDINGS  Technique: Semiupright AP view of the chest.     Devices: Right internal jugular catheter terminates over the low SVC.  Clamshell sternotomy wires. Bibasilar chest tubes. Mediastinal drain.  Feeding tube  passes inferior to the field of view.    Bibasilar perihilar streaky opacities and left retrocardiac opacity.  Tiny left pneumothorax unchanged. Stable left pleural effusion. Trace  right pleural effusion.    Cardiac silhouette stable in size.       Impression    IMPRESSION:     1. Right central venous catheter terminates over the low SVC.  Additional support devices stable.  2. Stable small/moderate left pleural effusion and trace right pleural  effusion.  3. Stable tiny left pneumothorax.  4. Stable left basilar atelectasis and bibasilar/perihilar atelectasis  versus edema.    I have personally reviewed the examination and initial interpretation  and I agree with the findings.    JOHANN MORAES MD         SYSTEM ID:  [id number]   XR Chest Port 1 View    Narrative    Exam: XR CHEST PORT 1 VIEW, 7/15/2022 6:08 AM    Indication: chest tubes and R hydropneumothorax s/p bilateral lung  transplant    Comparison: 7/14/2022    Findings:   Bibasilar chest tubes are in place. Mediastinal drain is in place.  Right IJ central line tip in the mid superior vena cava. Feeding tube  is seen coursing through the mediastinum tip projects off the film.    No interval change in the apparent loculated pleural effusion at the  left lung base. Small amount of fluid is seen in the fissure on the  right. Biapical pleural fluid is seen. Small left apical pneumothorax.  Heart is upper limits of normal in size. Pulmonary vasculature. By  predominantly interstitial opacities throughout both lungs      Impression    Impression:   1. Stable support devices.  2. Stable loculated left basilar pleural effusion.  3. Stable left apical hydropneumothorax.  4. Interstitial process throughout both lungs suggests edema.    GABRIELLA SNELL MD         SYSTEM ID:  [id number]   XR Abdomen Port 2 Views    Impression    RESIDENT PRELIMINARY INTERPRETATION  IMPRESSION:   Nonobstructive bowel gas pattern with large amount of stool in the  descending colon, similar  to prior. No free air.

## 2022-07-15 NOTE — PROGRESS NOTES
"  Cardiovascular Surgery Progress Note  07/15/2022         Assessment and Plan:     Sofie is a 62 F PMH of oxygen-dependent COPD, HFpEF, HTN, HCV, and osteopenia who underwent bilateral lung transplant on 6/28/22 with Dr. Sunshine. This was complicated by left upper extremity acute limb ischemia s/p left radial thrombectomy on 7/1/22. Recovering renal function, will hold off on tunneled dialysis line at this time.      - Right Pleural tube remains to suction, can ambulate off suction.   - Left pleural tube remains to suction, stripped tube 7/15 with immediate 150 mL output. Left tube site needs Vaseline gauze gauze wrapped around tube at the skin to avoid air leaking into chest. Can ambulate off suction.   - Removed pericardial tube 7/15 without immediate complication.     Discussed with Dr Sunshine through both written and verbal communication.      Andres Wilson PA-C  Cardiothoracic Surgery  Pager [phone]    9:35 AM   July 15, 2022          Interval History:     No overnight events.    Having a fair amount of abdomen discomfort and is distended.   Has rectal tube with decreasing output.          Physical Exam:   Blood pressure 127/62, pulse 91, temperature 97.6  F (36.4  C), temperature source Oral, resp. rate 11, height 1.575 m (5' 2\"), weight 74.5 kg (164 lb 3.9 oz), SpO2 99 %, not currently breastfeeding.  Vitals:    07/13/22 0400 07/14/22 0800 07/15/22 0000   Weight: 75.6 kg (166 lb 10.7 oz) 75.5 kg (166 lb 7.2 oz) 74.5 kg (164 lb 3.9 oz)      Gen: A&Ox4, NAD  Neuro: no focal deficits   CV: HD stable  Pulm: normal breathing effort  Abd: distended but soft, mild tenderness  Incision: clean, dry, intact, no erythema, sternum stable  Tubes/drain sites: dressing clean and dry, serosanguinous output, no air leak. 24 hr output 80 mL.    * stripped L pleural and Pericardial with about 350 mL total removed.     * removed pericardial drain without immediate complication         Data:    Imaging:  reviewed recent " imaging, no acute concerns    Labs:  BMP  Recent Labs   Lab 07/15/22  0809 07/15/22  0420 07/15/22  0342 07/14/22  2356 07/14/22  2234 07/14/22 2023 07/14/22  1701 07/14/22  0752 07/14/22 0459   NA  --  135*  --   --  134*  --  141  --  140   POTASSIUM  --  4.6  --   --  4.4  --  5.1  --  4.3   CHLORIDE  --  93*  --   --  92*  --  97*  --  92*   ESTUARDO  --  8.7*  --   --  8.5*  --  8.8  --  8.6*   CO2  --  34*  --   --  36*  --  24  --  42*   BUN  --  123.0*  --   --  119.0*  --  110.0*  --  155.0*   CR  --  2.65*  --   --  2.66*  --  2.35*  --  3.00*   * 245* 242* 214* 207*   < > 215*   < > 200*  193*    < > = values in this interval not displayed.     CBC  Recent Labs   Lab 07/15/22  0422 07/14/22  1901 07/14/22 0459 07/13/22 0428   WBC 9.8 10.3 10.5 10.4   RBC 2.72* 3.04* 2.19* 2.31*   HGB 8.3* 9.4* 6.8* 7.1*   HCT 27.6* 30.6* 22.4* 23.7*   * 101* 102* 103*   MCH 30.5 30.9 31.1 30.7   MCHC 30.1* 30.7* 30.4* 30.0*   RDW 16.5* 17.0* 16.4* 16.3*    295 274 255

## 2022-07-15 NOTE — PROGRESS NOTES
Nephrology Progress Note  07/15/2022         Sofie Rodriguez is a 62 yom with hx of HTN, Hep C, osetopenia, previous polysubstance use (stopped 2019) and severe COPD who is s/p BSLT on 6/28/2022.  Had ~2.5h of bypass time, was uncomplicated but now has post op BACILIO in setting of continued need for vasopressors thought to be due to vasoplegia.  Had radial artery thrombus requiring thrombectomy on 7/2.  Nephrology consulted for BACILIO with mild hyperkalemia and oliguric UOP.       Interval History :   Mrs Higgins was initiated on HD yesterday with 2h low flow run, increasing to 2.5h and ~250bfr today.  Will pull 1-2L of UF as BP's are stable but mainly running for clearance.  Plan for 3rd run tomorrow then will decide daily and monitor for recovery.         Assessment & Recommendations:   BACILIO-Normal baseline renal fx historically and at time of surgery.  No renal imaging but low suspicion of obstruction so will consider if she does not show improvement.  BACILIO is likely hypoperfusion with ~2.5h of bypass time and tacrolimus although levels have been within range (needing higher doses of tacro).   Will manage this medically for now and continue to monitor.  UA initially showed hyaline casts on 6/28..  Noted that she did not get contrast for thrombectomy on 7/2.  Cr had started to improve but now again on the rise prompting starting RRT on 7/14.                -BACILIO, likely hypoperfusion and need for tacro, also now with supratherapeutic vanco.   With lack of improvement and ongoing need for tacro with up and down levels we are planning to start dialysis.  With some renal fx and chance of recovery soon we are planning to re-wire her RIJ to an HD line.                -HD today, 250bfr/500dfr, 1-2L of UF, bicarb 40 to maintain.                 -Dialysis consent is signed and scanned in under media 7/14.                -Continue to avoid nephrotoxins, norepi weaned off, would keep SBP >100 as able, hypotensive due to vasoplegia.  "      Volume-UOP 715cc without diuretics yesterday, planning on 1-2L of UF with run today as she is edematous and BP's are stable but would back off for hypotension as we are mostly running for clearance.      Electrolytes-K 4.6, bicarb 34, VBG 7.32/79/52/41.  Hypercapnia thought to be related to longstanding COPD and should get better with time after transplant.       BMD-Ca up at 8.7, iCal WNL throughout, will follow.  Mg and Phos mildly up consistent with BACILIO.       Lung Tx-On Tacro, levels have been within range, last level 7.9.  Has hypercapnia despite transplant and reasonable CXR/oxygenation, team expects this to improve with time.       Anemia-Hgb 6.8, acute management per team.       Nutrition-Renal TF     Time spent: 30 minutes on this date of encounter for chart review, physical exam, medical decision making and co-ordination of care.      Seen and discussed with Dr Bauman     Recommendations were communicated to primary team via verbal communication.          RUFUS Cedeño CNS  Clinical Nurse Specialist  [phone]    Review of Systems:   I reviewed the following systems:  Gen: No fevers or chills  CV: No CP at rest  Resp: No SOB at rest  GI: No N/V    Physical Exam:   I/O last 3 completed shifts:  In: 1977.08 [I.V.:324.08; NG/GT:708]  Out: 2175 [Urine:875; Other:700; Stool:480; Chest Tube:120]   /62   Pulse 91   Temp 97.6  F (36.4  C) (Oral)   Resp 11   Ht 1.575 m (5' 2\")   Wt 74.5 kg (164 lb 3.9 oz)   SpO2 99%   BMI 30.04 kg/m       GENERAL APPEARANCE: On NC, in no distress.    EYES: No scleral icterus  NECK:  No JVD  Pulmonary: lungs clear to auscultation with equal breath sounds bilaterally, no clubbing or cyanosis  CV: Regular rhythm, normal rate, no rub   - Edema+2 generalized.   GI: soft, nontender, normal bowel sounds  MS: no evidence of inflammation in joints, no muscle tenderness  : + Dong  SKIN: no rash, warm, dry  NEURO: Answering questions appropriately, no focal " deficits.      Labs:   All labs reviewed by me  Electrolytes/Renal - Recent Labs   Lab Test 07/15/22  0809 07/15/22  0420 07/15/22  0342 07/14/22 2356 07/14/22 2234 07/14/22 2023 07/14/22  1701 07/14/22  0752 07/14/22  0459 07/13/22  0434 07/13/22  0428   NA  --  135*  --   --  134*  --  141  --  140   < > 139   POTASSIUM  --  4.6  --   --  4.4  --  5.1  --  4.3   < > 4.5   CHLORIDE  --  93*  --   --  92*  --  97*  --  92*   < > 89*   CO2  --  34*  --   --  36*  --  24  --  42*   < > 42*   BUN  --  123.0*  --   --  119.0*  --  110.0*  --  155.0*   < > 150.0*   CR  --  2.65*  --   --  2.66*  --  2.35*  --  3.00*   < > 3.23*   * 245* 242*   < > 207*   < > 215*   < > 200*  193*   < > 239*   ESTUARDO  --  8.7*  --   --  8.5*  --  8.8  --  8.6*   < > 8.6*   MAG  --  2.7*  --   --  2.5*  --  2.8*  --  3.1*  --  2.8*   PHOS  --  5.2*  --   --   --   --   --   --  5.1*  --  5.0*    < > = values in this interval not displayed.       CBC -   Recent Labs   Lab Test 07/15/22  0422 07/14/22  1901 07/14/22 0459   WBC 9.8 10.3 10.5   HGB 8.3* 9.4* 6.8*    295 274       LFTs -   Recent Labs   Lab Test 07/15/22  0420 07/14/22  0459 07/11/22  0438   ALKPHOS 74 64 78   BILITOTAL 0.2 0.2 <0.2   ALT 32 22 22   AST 31 22 16   PROTTOTAL 4.9* 4.6* 4.6*   ALBUMIN 2.9* 2.6* 2.7*       Iron Panel - No lab results found.        Current Medications:    sodium chloride 0.9%  250 mL Intravenous Once in dialysis/CRRT     sodium chloride 0.9%  300 mL Hemodialysis Machine Once     acetaminophen  975 mg Oral or Feeding Tube TID     acetylcysteine  2 mL Nebulization 4x Daily     alteplase  1 mg Intravenous Once     aspirin  81 mg Oral Daily     calcium carbonate 600 mg-vitamin D 400 units  1 tablet Oral BID w/meals     ceFAZolin  2 g Intravenous Pre-Op/Pre-procedure x 1 dose     heparin ANTICOAGULANT  5,000 Units Subcutaneous Q8H [facility]     insulin aspart  1-12 Units Subcutaneous Q4H     insulin glargine  15 Units Subcutaneous BID      lactobacillus rhamnosus (GG)  1 capsule Oral BID     levalbuterol  1.25 mg Nebulization 4x Daily     midodrine  15 mg Oral or Feeding Tube Q8H     multivitamin, therapeutic  1 tablet Per Feeding Tube Daily     mycophenolate  1,000 mg Oral or Feeding Tube BID     - MEDICATION INSTRUCTIONS -   Does not apply Once     nystatin   Topical BID     nystatin  1,000,000 Units Swish & Swallow 4x Daily     pantoprazole  40 mg Oral or Feeding Tube Daily     predniSONE  15 mg Per Feeding Tube BID     protein modular  1 packet Per Feeding Tube Daily     sodium chloride (PF)  3 mL Intracatheter Q8H     sodium chloride (PF)  3 mL Intracatheter Q8H     sodium chloride (PF)  9 mL Intracatheter During Dialysis/CRRT (from stock)     sodium chloride (PF)  9 mL Intracatheter During Dialysis/CRRT (from stock)     tacrolimus  5 mg Per Feeding Tube BID IS     thiamine  100 mg Oral or Feeding Tube Daily     valGANciclovir  450 mg Oral or NG Tube Once per day on Mon Thu     vancomycin  125 mg Oral or Feeding Tube Q6H TONY       dextrose 35 mL/hr at 07/15/22 0900

## 2022-07-15 NOTE — PLAN OF CARE
ICU End of Shift Summary. See flowsheets for vital signs and detailed assessment.    Changes this shift: Alert and oriented. Tylenol, prn Oxycodone and Atarax given for abdominal and generalized pain. Pt had three episodes of SVT in 170s-180s today. One resolved after carotid massage from MD this AM; two episodes of SVT during dialysis which resolved after stopped pulling fluid and dc'd dialysis, see dialysis note. HTN this evening, started on scheduled Metoprolol. On 1L Oxymask (d/t NG and NJ in place); unable to wean to RA d/t desatting to 80s on RA. Prn Zofran and Compazine given for intermittent nausea. NG placed this evening to LIS to assist with abdominal distension. 700 mL brown/bile drainage came out immediately. TF restarted at 1800 at 15 mL/hr; will advance TF to goal per MD orders. Rectal tube putting out small amount with leaking around site. HD today, pulled about 250 mL d/t SVT episodes. Pt got up to chair with PT today with walker and Ax2; only able to sit up briefly d/t needing to get back to bed for dialysis. Daughter Charity updated via telephone.     Plan: Continue to advance TF to goal, monitor NG output. Wear BiPAP overnight if tolerated. Transfer to [facility] [place] once bed available.      Goal Outcome Evaluation:    Plan of Care Reviewed With: patient     Overall Patient Progress: no change

## 2022-07-15 NOTE — PROGRESS NOTES
[facility]    Progress Note - Hospitalist Service, GOLD TEAM 10       Date of Admission:  6/28/2022    Assessment & Plan            Sofie Rodriguez is a 62 year old female admitted on 6/28/2022. She has PMH of end stage COPD s/p b/l lung transplant on 6/28/2022, HTN, HFpEF, h/o hepC, oteopenia, and former methamphetamine use, and she is currently transferring to [place] [id number] Medicine from MICU on 7/15/2022. She was admitted to the MICU on 7/13/20222 with prior hospital course complicated by left upper extremity acute limb ischemia s/p left radial thrombectomy on 7/1/2022. She was primarily treated for acute hypercapnic respiratory failure on intermittent BIPAP with worsening BACILIO while in the MICU. Currently stable on 2L NC but with worsening abdominal pain.     Summary of today's plan:   - VBG improved after HD yesterday (7/14), but was similar to prior this AM; patient was not on BIPAP last night due to pain, will continue to trend VBG daily  - NIF showing R hemidiaphragm palsy; strongly encouraged patient to be active (getting up out of bed) and participate in lung physiotherapy  - f/u Myasthenia gravis panel  - Received 15U glargine this AM, will re-evaluate insulin need pending CT abd today  - Midodrine decreased to 10mg TID from 15mg TID, BP mostly normal today with one episode this morning of systolic in 90s  - HD again today; yesterday's session was limited due to tachyarrhythmia  - CT abd today      #End stage COPD s/p BOLT 6/28/2022  #Acute hypercapnic respiratory failure, likely 2/2 decreased central respiratory drive vs respiratory muscle weakness  Patient received bilateral lung transplant for end-stage COPD on 6/28. Chart review shows hypercarbia starting on 7/2 that was not adequately compensated by respiration. On prior exam, patient noted to take shallow, infrequent breaths with occasional episodes of hyperventilation. Ddx for hypercapnic respiratory  failure currently includes decreased central respiratory drive, respiratory muscle weakness, and intrinsic lung pathology. Low concern for encephalitis given patient is on minimal sedating medications with appropriate mental status. TSH normal last week, so unlikely hypothyroidism. Bedside US in MICU showed normal diaphragmatic movement, though formal SNIFF test was performed on 7/14 showed R hemidiaphragm palsy. Of note transplanted lungs were also considered small for body size and could contribute to decreased respiratory compensation for hypercarbia. BIPAP improved patient's pH, and last ABG on 7/13 showed pH of 7.35 while on nasal cannula. Elevated pCO2 and bicarb still present. Most recent bronch 7/12 for clearance/inspection given weak cough. VBG showed improved CO2 immediately after HD yesterday, but was back to previous this AM. Patient did not use BIPAP last night due to pain (nonspecific). Supp O2 decreased from 2L to RA this morning.   - f/u bronch studies from 7/12  - trend VBG  - NC (though patient was not needing when last seen) with intermittent BIPAP for night and daytime naps; goal to keep O2sat above 92%  - f/u myasthenia gravis panel  - oxycodone decreased from 5 to 2.5-5mg q4h PRN    #Post-transplant  Immunosuppression:  - Prednisone 15mg BID  - Tacrolimus 5mg BID  - MMF 1000mg BID  Prophylasxis:  - CMV - Valgancyclover  - Thrush - PO nysatin  - PJP - TMP-SMX (currently held given BACILIO)    #Chest tubes  - 2 chest tubes (basilar) in place currently (pericardial drain was removed today). CT chest 7/14 showing unchanged bilateral effusions and unchanged biapical pneumothoraces with resolved left basilar pneumothorax; bibasilar chest tubes in place with pericardial drain (which was removed this morning).   - daily CXR  - CV surgery and transplant pulm following    #Shock of unclear etiology, improving  #H/o HTN  #H/o HFpEF  Previously on levophed until 7/10. Last echo 7/7 unchanged from previous  showing normal EF with good LV function, so unlikely cardiogenic shock. S/p hydrocortisone 7/6-7/9 and fludrocortisone 7/607/11 without significant improvement, so unlikely adrenal insufficiency. Had 1 episode of BP down to 90s this morning, but was otherwise around 120-130s systolic today (7/15).   - Decrease Midodrine to 10mg TID (from 15mg TID)  - Holding PTA lasix 20mg daily    #C.diff  #Concern for toxic megacolon  Worsening diarrhea via rectal pouch with new onset abd pain that is significantly worse today (7/15). C. Diff positive on 7/11 with vanc started on 7/12.   - PO vanc 125mg QID (7/12-7/26)  - holding bowel regimen  - abd ct w/o contrast today    #NJ tube  Currently NPO given abd pain, will re-evaluate after ct abd today (7/15).   Feeding regimen:   - Adult Formula Drip Feeding: Continuous Novasource Renal; Nasojejunal; Goal Rate: 35; initiate at goal rate; mL/hr; Medication - Feeding Tube Flush Frequency: At least 15-30 mL water before and after medication administration and with tube clogging    #BACILIO  #Hypermagnesemia  #Hyperphosphatemia  Baseline renal function was normal prior to surgery. New onset renal failure after transplant, likely multifactorial due to pre-renal hypotension (2.5h bypass during surgery) and intra-renal from use of nephrotoxic agents including tacrolimus and vancomycin. 6/28 UA showed hyaline casts. Cr initially improved but now rising again along with BUN. Nephrology is on consult and has patient on intermittent HD starting 7/14 via non-tunneled R internal jugular cath. HD on 7/14 was limited by tachyarrhythmia. Patient is scheduled for another session today.   - nephrology following  - R internal jugular cathether placed with HD line on 7/14, second dialysis session today  - discontinued sodium bicarb tabs    #Tachyarrthymia  SVT vs afib. Previously appeared to be positional. Had an occurrence during HD on 7/14. No episodes today 7/15.   - Not on beta blocker currently given  BP needing midodrine    #Stress-induced hyperglycemia  Blood glucose was consistently in high 100s to mid 200s over past 2 days. Insulin was held while patient was NPO. Received half dose glargine this AM.   - Previously on 15U glargine BID; will re-evaluate insulin need pending CT abd today    #Anemia  Initially from acute blood loss. Hb dropped to 6.8 on 7/14, requiring 1U pRBC. Post-transfusion Hb 9.4 > 8.3 (7/15).   - continue to monitor  - transfuse for hgb<7            Diet: Adult Formula Drip Feeding: Continuous Novasource Renal; Nasojejunal; Goal Rate: 35; initiate at goal rate; mL/hr; Medication - Feeding Tube Flush Frequency: At least 15-30 mL water before and after medication administration and with tube clogging...  NPO per Anesthesia Guidelines for Procedure/Surgery Except for: Meds    DVT Prophylaxis: Heparin SQ  Dong Catheter: PRESENT, indication: Strict 1-2 Hour I&O, Strict 1-2 Hour I&O, Strict 1-2 Hour I&O  Fluids: not on maintenance fluids, bolus PRN for BP  Central Lines: PRESENT  CVC Double Lumen Right Internal jugular Non - tunneled-Site Assessment: WDL  Cardiac Monitoring: None  Code Status: Full Code      Disposition Plan   Pending further medical evaluation     The patient's care was discussed with the Attending Physician, Dr. Mahan.    Fan Lee MD  Hospitalist Service, GOLD TEAM [address] [place] [address]  Securely message with the Vocera Web Console (learn more here)  Text page via [facility] Paging/Directory   Please see signed in provider for up to date coverage information      Clinically Significant Risk Factors Present on Admission                      ______________________________________________________________________    Interval History   Sofie is feeling well with regard to her breathing. She was agreeable to try chest physiotherapy today, though is generally still tired. She is endorsing worsening abdominal pain that feels like soreness over  the epigastric area and does not radiate. Still have frequent diarrhea. Denies nausea or vomiting. No other pain noted. She did not use BIPAP overnight due to pain. Was on 2L NC in the morning and RA around noon. No fevers, chills, or urinary symptoms reported.     Data reviewed today: I reviewed all medications, new labs and imaging results over the last 24 hours. I personally reviewed today's CXR showing bibasilar chest tubes and mediastinal drain in place with stable L basilar pleural effusion and stable L apical hydropneumothorax .     Physical Exam   Vital Signs: Temp: 97.9  F (36.6  C) Temp src: Oral BP: 117/58 Pulse: 85   Resp: 15 SpO2: 98 % O2 Device: Nasal cannula Oxygen Delivery: 1 LPM  Weight: 164 lbs 3.88 oz  Physical Exam  Constitutional:       Appearance: Normal appearance.      Comments: Patient appears tired.    HENT:      Head: Normocephalic and atraumatic.      Nose:      Comments: Nasal cannula and NJ tube in place.   Cardiovascular:      Rate and Rhythm: Normal rate and regular rhythm.      Pulses: Normal pulses.      Heart sounds: Normal heart sounds. No murmur heard.    No gallop.   Pulmonary:      Effort: Pulmonary effort is normal.      Comments: Clear to auscultation of anterior upper lung fields bilaterally. Chest tubes in place.   Abdominal:      General: Bowel sounds are normal.      Palpations: Abdomen is soft.      Comments: Tender to palpation of epigastric area. Distention is present without a palpable mass. Rectal tube is in place with dark green stool in bag.    Genitourinary:     Comments: Dong catheter in place.   Musculoskeletal:      Comments: 2+ pitting edema of bilateral lower extremities. +2 pedal pulses.    Skin:     General: Skin is warm and dry.      Capillary Refill: Capillary refill takes less than 2 seconds.   Neurological:      General: No focal deficit present.      Mental Status: She is alert and oriented to person, place, and time.   Psychiatric:         Mood and  Affect: Mood normal.           Data   Recent Labs   Lab 07/15/22  1155 07/15/22  0809 07/15/22  0422 07/15/22  0420 07/14/22  2356 07/14/22  2234 07/14/22 2023 07/14/22  1901 07/14/22  1701 07/14/22  0752 07/14/22  0459   WBC  --   --  9.8  --   --   --   --  10.3  --   --  10.5   HGB  --   --  8.3*  --   --   --   --  9.4*  --   --  6.8*   MCV  --   --  102*  --   --   --   --  101*  --   --  102*   PLT  --   --  262  --   --   --   --  295  --   --  274   NA  --   --   --  135*  --  134*  --   --  141  --  140   POTASSIUM  --   --   --  4.6  --  4.4  --   --  5.1  --  4.3   CHLORIDE  --   --   --  93*  --  92*  --   --  97*  --  92*   CO2  --   --   --  34*  --  36*  --   --  24  --  42*   BUN  --   --   --  123.0*  --  119.0*  --   --  110.0*  --  155.0*   CR  --   --   --  2.65*  --  2.66*  --   --  2.35*  --  3.00*   ANIONGAP  --   --   --  8  --  6*  --   --  20*  --  6*   ESTUARDO  --   --   --  8.7*  --  8.5*  --   --  8.8  --  8.6*   * 204*  --  245*   < > 207*   < >  --  215*   < > 200*  193*   ALBUMIN  --   --   --  2.9*  --   --   --   --   --   --  2.6*   PROTTOTAL  --   --   --  4.9*  --   --   --   --   --   --  4.6*   BILITOTAL  --   --   --  0.2  --   --   --   --   --   --  0.2   ALKPHOS  --   --   --  74  --   --   --   --   --   --  64   ALT  --   --   --  32  --   --   --   --   --   --  22   AST  --   --   --  31  --   --   --   --   --   --  22   LIPASE  --   --   --  21  --   --   --   --   --   --   --     < > = values in this interval not displayed.     Recent Results (from the past 24 hour(s))   XR Chest/Heart Fluoro    Narrative    EXAM:  XR CHEST/HEART FLUORO 7/14/2022 2:59 PM    INDICATION: S/p lung transplant, evaluate diaphragm function    COMPARISON:  Same-day chest radiograph    FLUOROSCOPY TIME: 0.6 minutes    FINDINGS:  During normal breathing, there is lack of of movement of the right  hemidiaphragm compared to the left. During sniff test there is  paradoxical superior  excursion of the right hemidiaphragm. There is  normal excursion of the left hemidiaphragm.    The remaining visualized lung bases and abdomen are fluoroscopically  unremarkable. Partially visualized enteric tube and right central  venous catheter projecting over the low SVC. Intact clamshell  sternotomy wires. Bilateral pleural chest tubes in place.      Impression    IMPRESSION:  Findings consistent with right hemidiaphragm palsy.    I have personally reviewed the examination and initial interpretation  and I agree with the findings.    YANNICK BENAVIDEZ MD         SYSTEM ID:  [id number]   XR Chest Port 1 View    Narrative    EXAM: XR CHEST PORT 1 VIEW  7/14/2022 6:17 PM     HISTORY:  CVC location       COMPARISON:  7/14/2022    FINDINGS  Technique: Semiupright AP view of the chest.     Devices: Right internal jugular catheter terminates over the low SVC.  Clamshell sternotomy wires. Bibasilar chest tubes. Mediastinal drain.  Feeding tube passes inferior to the field of view.    Bibasilar perihilar streaky opacities and left retrocardiac opacity.  Tiny left pneumothorax unchanged. Stable left pleural effusion. Trace  right pleural effusion.    Cardiac silhouette stable in size.       Impression    IMPRESSION:     1. Right central venous catheter terminates over the low SVC.  Additional support devices stable.  2. Stable small/moderate left pleural effusion and trace right pleural  effusion.  3. Stable tiny left pneumothorax.  4. Stable left basilar atelectasis and bibasilar/perihilar atelectasis  versus edema.    I have personally reviewed the examination and initial interpretation  and I agree with the findings.    JOHANN MORAES MD         SYSTEM ID:  [id number]   XR Chest Port 1 View    Narrative    Exam: XR CHEST PORT 1 VIEW, 7/15/2022 6:08 AM    Indication: chest tubes and R hydropneumothorax s/p bilateral lung  transplant    Comparison: 7/14/2022    Findings:   Bibasilar chest tubes are in place. Mediastinal  drain is in place.  Right IJ central line tip in the mid superior vena cava. Feeding tube  is seen coursing through the mediastinum tip projects off the film.    No interval change in the apparent loculated pleural effusion at the  left lung base. Small amount of fluid is seen in the fissure on the  right. Biapical pleural fluid is seen. Small left apical pneumothorax.  Heart is upper limits of normal in size. Pulmonary vasculature. By  predominantly interstitial opacities throughout both lungs      Impression    Impression:   1. Stable support devices.  2. Stable loculated left basilar pleural effusion.  3. Stable left apical hydropneumothorax.  4. Interstitial process throughout both lungs suggests edema.    GABRIELLA SNELL MD         SYSTEM ID:  [id number]   XR Abdomen Port 2 Views    Impression    RESIDENT PRELIMINARY INTERPRETATION  IMPRESSION:   Nonobstructive bowel gas pattern with large amount of stool in the  descending colon, similar to prior. No free air.

## 2022-07-15 NOTE — PROGRESS NOTES
HEMODIALYSIS TREATMENT NOTE    Date: 7/15/2022  Time: 5:23 PM    Data:  Pre Wt: 74.5 kg   Desired Wt: 73.2  kg   Post Wt:  74.2 kg (calculated)  Weight change: .3  kg  Ultrafiltration - Post Run Net Total Removed (mL): 250 mL  Vascular Access Status: patent  Dialyzer Rinse: Light, Streaked  Total Blood Volume Processed: 36.4 L Liters  Total Dialysis (Treatment) Time: 2 hours 28 minutes Hours    Lab:   No    Assessment:  Pt ran for 2.5 hours on a K2/Ca2.5 bath with 250 BFR/500DFR. HR  until 1 hour 50 minutes into treatment, -180 sustained. UF off and Madan CNP updated, 250 mL NS admin and PCN at bedside. HR decreased to 79-85 post NS admin, UF remained off for remainder of treatment. HR back to 170's, pt taken off two minutes early. Pt rinsed back, CVC NS locked, and caps changed. Report given to PCN.     Plan:    Per renal team

## 2022-07-15 NOTE — CONSULTS
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 Sunshine. This was complicated by left upper extremity acute limb ischemia s/p left radial thrombectomy on 7/1/22. Recovering renal function, will hold off on tunneled dialysis line at this time.      Areas Assessed:      Areas visualized during today's visit: Face and neck    Pressure Injury Location: R nare and septum     Last photo:     Wound type: Pressure Injury     Pressure Injury Stage: 2, hospital acquired      This is a Medical Device Related Pressure Injury (MDRPI) due to oxygen tubing and NG    Wound base: 50 % dermis, 50 % blister, ruptured      Palpation of the wound bed: normal      Drainage: scant     Description of drainage: serous     Measurements (length x width x depth, in cm) 0.4  x 0.4  x  0.1 cm      Tunneling N/A     Undermining N/A  Periwound skin: Intact      Color: pink      Temperature: normal   Odor: none  Pain: mild, tender  Pain intervention prior to dressing change: patient tolerated well  Treatment goal: Protection  STATUS: initial assessment  Supplies ordered: supplies stored on unit       Treatment Plan:     R nare wound(s): Daily  cleanse with saline and dry, then apply a very thin layer of vaseline over wound bed and leave URI. Switch between nasal cannula and face mask for oxygen delivery to help offload area.      Orders: Written    RECOMMEND PRIMARY TEAM ORDER: None, at this time  Education provided: plan of care  Discussed plan of care with: Patient  WOC nurse follow-up plan: twice weekly  Notify WOC if wound(s) deteriorate.  Nursing to notify the Provider(s) and re-consult the WOC Nurse if new skin concern.    DATA:     Current support surface: Standard  Low air loss  mattress  Containment of urine/stool: Indwelling catheter  BMI: Body mass index is 30.04 kg/m .   Active diet order: Orders Placed This Encounter      NPO per Anesthesia Guidelines for Procedure/Surgery Except for: Meds     Output: I/O last 3 completed shifts:  In: 1977.08 [I.V.:324.08; NG/GT:708]  Out: 2175 [Urine:875; Other:700; Stool:480; Chest Tube:120]     Labs: Recent Labs   Lab 07/15/22  0422 07/15/22  0420   ALBUMIN  --  2.9*   HGB 8.3*  --    WBC 9.8  --      Pressure injury risk assessment:   Sensory Perception: 3-->slightly limited  Moisture: 3-->occasionally moist  Activity: 2-->chairfast  Mobility: 3-->slightly limited  Nutrition: 1-->very poor  Friction and Shear: 1-->problem  Jose Alejandro Score: 13      Dept. Pager: [phone]  Dept. Office Number: [phone]

## 2022-07-15 NOTE — PROGRESS NOTES
Pulmonary Medicine  Cystic Fibrosis - Lung Transplant Team  Progress Note  07/15/2022       Patient: Sofie Rodriguez  MRN: [id number]  : 1960 (age 62 year old)  Transplant: 2022 (Lung), POD#17  Admission date: 2022    Assessment & Plan:     Sofie Rodriguez is a 62 year old female with a PMH significant for end-stage COPD, HTN, HFpEF, Mycobacterium peregrinum colonization, h/o hepatitis C, HECTOR s/p LEEP procedure, osteopenia, and former methamphetamine use.  Pt. is now s/p BSLT on 22, lungs slightly undersized.   Persistent low dose pressor needs post-op through 7/10.  Extubated POD #2 but with persistent hypercapnia, mostly compensated and only slightly improved with intermittent NIPPV.  Also with left radial artery thrombus (presumed secondary to arterial line) s/p thrombectomy , BACILIO, and C diff.  Transferred from CVICU to MICU service on .     Today's recommendations:  - CPT QID, encourage consistent participation d/t concern for ineffective airway clearance (pt. refused ALL therapies yesterday)  - Wean O2 as able to keep >92%, NIPPV intermittently during the day and overnight as tolerated given persistent hypercapnia (though hypercapnia not improved on therapy, will consider stopping if pt. continues to refuse)  - Aggressive volume removal as able  - Will change to 2-view CXR daily starting tomorrow (able to transfer down to imaging, good opportunity for incidental therapy)  - Encourage increased activity including up in chair (minimal day time in bed) and active participation in PT/OT given concern for deconditioning and low activity level (not sure if this is limited by physical and/or mental barriers)  - Tacrolimus level today therapeutic, repeat level  (ordered)  - Prednisone taper next due   - Bactrim for PJP ppx on hold for BACILIO, will consider dapsone as alternative but deferring for now given low hgb  - Following recent bronch cultures from   - DSA, EBV, IgG, and  donor risk labs ordered 7/28  - Wean midodrine as able (consider giving only on iHD days) and start on metoprolol  - Recommend transfer out of ICU today (pending bed availability) with encouragement of aggressive rehab     S/p bilateral sequential lung transplant (BSLT) for end stage COPD:  Persistent hypercapneic respiratory failure:   Persistent hypotension:   Bilateral hydroPTX:  Right hemidiaphragm palsy: Explant pathology with severe emphysema with subpleural bullae formation, changes of chronic bronchitis, subpleural scars and patchy pulmonary edema; benign hilar lymph nodes.  No evidence of PGD post-op.  Extubated 6/30.  Persistent hypercapnia without dyspnea, appears to be a respiratory drive problem.  Diaphragm assessment by US did not show dysfunction (per CVICU team) and metabolic cart study only 6 mins but not supportive of overfeeding.  Some improvement with BiPAP, limited tolerance in part due to anxiety.  Chest CT (7/7) with bilateral small hydroPTX, chest wall subcutaneous emphysema, air collection of right chest wall anteriorly associated with pleura on right, and some narrowing of left sided anastomosis.  Persistent low dose pressor requirement, weaned off 7/10, remains on scheduled midodrine (also s/p hydrocortisone 7/6-7/9 and fludrocortisone 7/6-7/11).  Bronch (7/12) given CXR changes with small amount of granulation tissue posteriorly in right anastomosis (no stenosis or dehiscence), thick brown secretions in proximal airways, and right mainstem/RUL mucous plug (removed).  CT chest (7/14) with stable biapical PTX and stable dependent bilateral pleural effusions.  SNIFF (7/14) notable for right hemidiaphragm palsy.  On 1-2L NC with intermittent use of BiPAP (refused overnight).  - DSA one month post-transplant (7/28, ordered)  - Ammonia monitoring twice weekly (screening for hyperammonemia post-lung transplant)  - Nebs: levalbuterol and Mucomyst QID   - Pulmonary toilet with chest physiotherapy  QID, encourage consistent participation d/t concern for ineffective airway clearance (pt. refused ALL therapies yesterday)  - Aerobika and incentive spirometry hourly while awake  - Supplemental oxygen as needed to maintain SpO2 >92% (wean as able), NIPPV intermittently during the day and overnight as tolerated given persistent hypercapnia (though hypercapnia not improved on therapy, will consider stopping if pt. continues to refuse)  - Chest tubes managed by surgical team  - Daily CXR, today with slight increase in LLL hazy opacities and slight improvement in RLL, pulmonary edema t/o (personally reviewed), will change to 2-view CXR daily starting tomorrow (able to transfer down to imaging)  - Encourage increased activity including up in chair (minimal day time in bed) and active participation in PT/OT given concern for deconditioning and low activity level (not sure if this is limited by physical and/or mental barriers)     Immunosuppression:  Induction therapy with basiliximab (and high dose IV steroid) given intraoperatively, repeating basiliximab dose on POD#4.  - Tacrolimus 5 mg BID (via NJ tube, held 7/11-7/12 for supratherapeutic level, peak level appropriate 7/11).  Goal level 8-12.  Level today 10, no dose adjustment.  Repeat level 7/18 (ordered).  - MMF 1000 mg BID (decreased 7/10 due to diarrhea)  - Prednisone 15 mg BID, next taper due 7/21 (not yet ordered)  Date AM dose (mg) PM dose (mg)   7/14/22 15 15   7/21/22 15 12.5   7/28/22 12.5 12.5   8/4/22 12.5 10   8/18/22 10 10   9/15/22 10 7.5   10/13/22 7.5 7.5   11/10/22 7.5 5   12/8/22 5 5   1/5/23 5 2.5      Prophylaxis:   - Bactrim for PJP ppx on hold since 7/7 for BACILIO, will consider dapsone as alternative but deferring for now given low hgb (G6PD sufficient)  - VGCV for CMV ppx (started 7/7)  - Nystatin for oral candidiasis ppx, 6 month course  - See below for serologies and viral ppx:    Donor Recipient Intervention   CMV status Positive Positive  Valganciclovir POD #8-90   EBV status Positive Positive EBV check monthly (7/28, ordered)   HSV status N/A Positive Not indicated      ID:  H/o M. peregrinum colonization: Donor bronch cultures (OSH) with Strep beta hemolytic (not group A).  Recipient cultures as below.  - IgG at one month (7/28, ordered)  - Bronch cultures (7/12) NGTD, follow  - AFB bronch culture (6/28, 6/29) NGTD, AFB to be sent on all future bronchs     Streptococcus pneumoniae:  Stenotrophomonas maltophilia: Noted in recipient cultures at time of transplant.  S/p ceftazidime 6/28-7/10, vancomycin 7/7-7/8 and 6/28-6/30, and levofloxacin 7/10-7/12 for total 2 week ABX course.     H/o hepatitis C: Diagnosed in 1980s, 2 mos of treatment, quant negative since 10/2017, last positive 2/20/17 ([phone]).  Glenis positive on 6/2021 with negative HCV PCR.  H/o remote EtOH abuse.  MR elastography (4/27/21) with hepatology review and consult without any concerns post transplant.  Hepatitis C RNA negative and hepatitis C antibody positive (old) on 6/28.     PHS risk criteria donor:  Additional labs required post-transplant (between 4-8 weeks post-op): Hepatitis B, Hepatitis C, and HIV by SHERI ([id number], ordered 7/28).     Other relevant problems managed by primary team:      BACILIO:   Hyperkalemia: Likely multifactorial including medications (Bactrim, tacrolimus) and hypotension.  Fludrocortisone 7/6-7/11.  Potassium now stable.  S/p non-tunneled HD line 7/14.  Initial iHD run 7/14 limited by afib with RVR vs SVT.  - Tacrolimus monitoring as above  - Volume management per nephrology and ICU team     C diff infection: Abdominal pain with diarrhea noted 7/8, AXR without dilated bowel, moderate colonic stool burden.  C diff positive 7/11.    - PO vancomycin (7/11) per ICU team     Left hand ischemia: Radial artery thrombosis identified on duplex doppler.  S/p thrombectomy on 7/2.  Completed high intensity heparin course.  Continue daily aspirin.    SVT: First noted on  7/14, prior to HD line placement.  Continues intermittently.  - Primary team to adjust HD line  - Recommend to wean midodrine as able (consider giving only on iHD days) and start on metoprolol    Abdominal pain: Noted 7/15, cramping pain.  ACR with large stool burden in colon, no obstruction or distention.  Some nausea but no emesis.  - CT abdomen per primary today     We appreciate the excellent care provided by the MICU team.  Recommendations communicated via telephone and this note.  Will continue to follow along closely, please do not hesitate to call with any questions or concerns.     Patient discussed with Dr. Miller.    Catherine Johnson, DNP, APRN, CNP  Inpatient Nurse Practitioner  Pulmonary CF/Transplant     Subjective & Interval History:     Continues on 2-2.5L NC, refused overnight BiPAP though VBG this morning is relatively stable.  Weak cough, difficulty with expectoration, refused all CPT yesterday.  C/o new abdominal pain (cramping) this morning, rectal tube with consistent moderate output, no distention on AXR but noted to have large colonic stool burden.  Intermittent nausea but no emesis.  Received 1u PRBC yesterday, hgb stable this morning but decreased from last evening, no overt signs of bleeding.  Did not get OOB yesterday.  Initial HD run yesterday aborted early d/t SVT.    Review of Systems:     C: No fever, no chills, no recent change in weight  INTEGUMENTARY/SKIN: No rash or obvious new lesions  ENT/MOUTH: No sore throat, no sinus pain, no nasal congestion or drainage  RESP: See interval history  CV: No chest pain, no palpitations, + peripheral edema, no orthopnea  GI: No reflux symptoms  : No dysuria  MUSCULOSKELETAL: See interval history  ENDOCRINE: Blood sugars persistently elevated >200  NEURO: No headache, no numbness or tingling  PSYCHIATRIC: See interval history    Physical Exam:     All notes, images, and labs from past 24 hours (at minimum) were reviewed.    Vital signs:  Temp: 98.1  " F (36.7  C) Temp src: Oral BP: 122/62 Pulse: 91   Resp: 16 SpO2: 93 % O2 Device: Nasal cannula Oxygen Delivery: 2 LPM Height: 157.5 cm (5' 2\") Weight: 74.5 kg (164 lb 3.9 oz)  I/O:     Intake/Output Summary (Last 24 hours) at 7/15/2022 0744  Last data filed at 7/15/2022 0700  Gross per 24 hour   Intake 1968.91 ml   Output 2175 ml   Net -206.09 ml     Constitutional: Lying in bed, in no apparent distress.   HEENT: Eyes with pink conjunctivae, anicteric.  Oral mucosa moist without lesions.   PULM: Mildly diminished air flow bilaterally.  No crackles, no rhonchi, no wheezes.  Non-labored breathing on 2.5L NC.  CV: Normal S1 and S2.  Transient SVT, resolved with vagal maneuver.  No murmur, gallop, + pericardial rub.  2-3+ BLE edema.   ABD: NABS, soft, nontender, nondistended.    MSK: Moves all extremities.  + muscle wasting.   NEURO: Hypoactive, eyes closed for much of interview, somewhat conversant.   SKIN: Warm, dry.  No rash on limited exam.  Incision well approximated.  PSYCH: Mood stable    Lines, Drains, and Devices:  Peripheral IV 07/14/22 Distal;Right;Posterior Lower forearm (Active)   Site Assessment WDL 07/15/22 0400   Line Status Saline locked 07/15/22 0400   Phlebitis Scale 0-->no symptoms 07/15/22 0400   Infiltration Scale 0 07/15/22 0400   Infiltration Site Treatment Method  None 07/15/22 0400   Number of days: 1       Peripheral IV 07/14/22 Anterior;Right Lower forearm (Active)   Site Assessment WDL 07/15/22 0400   Line Status Saline locked 07/15/22 0400   Phlebitis Scale 0-->no symptoms 07/15/22 0400   Infiltration Scale 0 07/15/22 0400   Infiltration Site Treatment Method  None 07/15/22 0400   Number of days: 1       CVC Double Lumen Right Internal jugular Non - tunneled (Active)   Site Assessment WDL 07/15/22 0400   Dressing Type Chlorhexidine disk;Transparent 07/15/22 0400   Dressing Status clean;dry;intact 07/15/22 0400   Dressing Change Due 07/17/22 07/15/22 0400   Line Necessity yes, meets " criteria 07/15/22 0400   Blue - Status saline locked 07/15/22 0400   Blue - Cap Change Due 07/21/22 07/14/22 1615   Red - Status saline locked 07/15/22 0400   Red - Cap Change Due 07/21/22 07/14/22 1615   Phlebitis Scale 0-->no symptoms 07/14/22 1200   Infiltration? no 07/15/22 0400   Infiltration Scale 0 07/15/22 0400   Infiltration Site Treatment Method  None 07/15/22 0400   Was a vesicant infusing? no 07/15/22 0400   CVC Comment HD LINE 07/15/22 0400   Number of days: 1     Data:     LABS    CMP:   Recent Labs   Lab 07/15/22  0420 07/15/22  0342 07/14/22  2356 07/14/22  2234 07/14/22  2023 07/14/22  1701 07/14/22  0752 07/14/22  0459 07/13/22  0434 07/13/22  0428 07/12/22  0419 07/12/22  0414 07/11/22  0805 07/11/22  0438   *  --   --  134*  --  141  --  140   < > 139   < > 137   < > 139   POTASSIUM 4.6  --   --  4.4  --  5.1  --  4.3   < > 4.5   < > 4.4   < > 4.4   CHLORIDE 93*  --   --  92*  --  97*  --  92*   < > 89*   < > 93*   < > 93*   CO2 34*  --   --  36*  --  24  --  42*   < > 42*   < > 35*   < > 38*   ANIONGAP 8  --   --  6*  --  20*  --  6*   < > 8   < > 9   < > 8   * 242* 214* 207*   < > 215*   < > 200*  193*   < > 239*   < > 253*   < > 233*   .0*  --   --  119.0*  --  110.0*  --  155.0*   < > 150.0*   < > 134.0*   < > 126.0*   CR 2.65*  --   --  2.66*  --  2.35*  --  3.00*   < > 3.23*   < > 3.11*   < > 2.78*   GFRESTIMATED 20*  --   --  20*  --  23*  --  17*   < > 16*   < > 16*   < > 19*   ESTUARDO 8.7*  --   --  8.5*  --  8.8  --  8.6*   < > 8.6*   < > 8.4*   < > 8.4*   MAG 2.7*  --   --  2.5*  --  2.8*  --  3.1*  --  2.8*  --  3.1*  --  2.7*   PHOS 5.2*  --   --   --   --   --   --  5.1*  --  5.0*  --  5.4*  --  5.4*   PROTTOTAL 4.9*  --   --   --   --   --   --  4.6*  --   --   --   --   --  4.6*   ALBUMIN 2.9*  --   --   --   --   --   --  2.6*  --   --   --   --   --  2.7*   BILITOTAL 0.2  --   --   --   --   --   --  0.2  --   --   --   --   --  <0.2   ALKPHOS 74  --   --   --    --   --   --  64  --   --   --   --   --  78   AST 31  --   --   --   --   --   --  22  --   --   --   --   --  16   ALT 32  --   --   --   --   --   --  22  --   --   --   --   --  22    < > = values in this interval not displayed.     CBC:   Recent Labs   Lab 07/15/22  0422 07/14/22  1901 07/14/22  0459 07/13/22  0428   WBC 9.8 10.3 10.5 10.4   RBC 2.72* 3.04* 2.19* 2.31*   HGB 8.3* 9.4* 6.8* 7.1*   HCT 27.6* 30.6* 22.4* 23.7*   * 101* 102* 103*   MCH 30.5 30.9 31.1 30.7   MCHC 30.1* 30.7* 30.4* 30.0*   RDW 16.5* 17.0* 16.4* 16.3*    295 274 255       INR: No lab results found in last 7 days.    Glucose:   Recent Labs   Lab 07/15/22  0420 07/15/22  0342 07/14/22  2356 07/14/22  2234 07/14/22 2023 07/14/22  1701   * 242* 214* 207* 227* 215*       Blood Gas:   Recent Labs   Lab 07/15/22  0420 07/14/22  1701 07/14/22  0503   PHV 7.32 7.49* 7.34   PCO2V 79* 46 85*   PO2V 52* 119* 52*   HCO3V 41* 35* 46*   LINDY 11.9* 10.7* 18.6*   O2PER 30 20 0       Culture Data No results for input(s): CULT in the last 168 hours.    Virology Data:   Lab Results   Component Value Date    FLUAH1 Not Detected 06/29/2022    FLUAH3 Not Detected 06/29/2022    AQ5844 Not Detected 06/29/2022    IFLUB Not Detected 06/29/2022    RSVA Not Detected 06/29/2022    RSVB Not Detected 06/29/2022    PIV1 Not Detected 06/29/2022    PIV2 Not Detected 06/29/2022    PIV3 Not Detected 06/29/2022    HMPV Not Detected 06/29/2022       Historical CMV results (last 3 of prior testing):  No results found for: CMVQNT  No results found for: CMVLOG    Urine Studies    Recent Labs   Lab Test 07/08/22  0831 07/05/22  1004   URINEPH 5.5 5.5   NITRITE Negative Negative   LEUKEST Negative Negative   WBCU 1 5       Most Recent [facility] Pulmonary Function Testing (FVC/FEV1 only)  FVC-Pre   Date Value Ref Range Status   04/29/2022 1.82 L    11/11/2021 2.17 L    06/14/2021 2.00 L      FVC-%Pred-Pre   Date Value Ref Range Status   04/29/2022 58 %     11/11/2021 70 %    06/14/2021 64 %      FEV1-Pre   Date Value Ref Range Status   04/29/2022 0.51 L    11/11/2021 0.53 L    06/14/2021 0.54 L      FEV1-%Pred-Pre   Date Value Ref Range Status   04/29/2022 20 %    11/11/2021 21 %    06/14/2021 21 %        IMAGING    Recent Results (from the past 48 hour(s))   XR Chest Port 1 View    Narrative    Exam: XR CHEST PORT 1 VIEW, 7/14/2022 5:44 AM    Indication: chest tubes and R hydropneumothorax s/p bilateral lung  transplant    Comparison: 7/13/2022    Findings:   Right IJ sheath at the confluence of the innominate veins. Feeding  tube seen coursing through the mediastinum. Tip projects off the film.  No change in the bibasilar chest tubes or mediastinal drain. Intact  clamshell sternotomy wires.    Hazy opacity over both lower lobes suggesting effusion and  atelectasis. Mild perihilar haziness suggesting edema. Fullness of the  left hilum could represent atelectasis. Focal consolidative process  isn't excluded. Biapical pleural thickening.      Impression    Impression:   1. Stable support devices  2. Bilateral pleural effusions with associated atelectasis.  3. Stable edema.  4. Prominent left hilum could represent atelectasis or postop  hematoma. This is unchanged. Focal consolidative process from  pneumonia isn't excluded.    GABRIELLA SNELL MD         SYSTEM ID:  [id number]   CT Chest w/o Contrast    Narrative    Chest CT without contrast 7/14/2022 9:57 AM    Clinical information: 62 years Female Interval f/u on prior PTX and  loculated effusions, persistent hypercapnia    Comparison: Same day chest radiograph. Chest CT without contrast  7/7/2022.    FINDINGS:    LINES/TUBES: Bilateral lung transplant post surgical changes.  Bibasilar chest tubes in place. Partially visualized postpyloric  feeding tube. Right IJ sheath terminates in the high SVC. Pericardial  drain.    LUNG: Persistent 2.5 cm solid ovoid masslike lesion in the posterior  left upper lobe (series 4 image  65). No groundglass opacities  bilaterally. Scattered sub-3 mm pulmonary nodules bilaterally.    LARGE AIRWAYS: Patent tracheobronchial tree without intraluminal mass.    PLEURA: Unchanged bilateral small effusions. Unchanged biapical  pneumothoraces and resolved left basilar pneumothorax.    VESSELS: Normal configuration of the great vessels. Main pulmonary  artery measures 3.7 cm in diameter. Mild aortic calcifications.    HEART: Cardiomegaly. No pericardial effusion. Extensive coronary  artery atherosclerotic calcifications.    MEDIASTINUM AND JENNIFER: No suspicious lymphadenopathy.    CHEST WALL: Intact shell sternotomy wires. Retrosternal air. Anterior  chest wall subcutaneous emphysema, resolved on the right and  persistent on the left.    LOWER NECK: Thyroid unremarkable.    UPPER ABDOMEN: Limited evaluation due to lack of contrast. Esophagus  appears unremarkable. Small amount of perihepatic ascites. Hyperdense  material within the stomach.    BONES: Mild thoracic spine degenerative changes. No acute osseous  abnormality. No suspicious bony lesions. Unremarkable soft tissues.      Impression    IMPRESSION:   1.  Unchanged biapical pneumothoraces, resolved left basilar  pneumothorax. Unchanged bilateral effusions.  2.  Pulmonary arterial hypertension suggested by main pulmonary artery  dilation.  3.  Small amount of perihepatic ascites.  4.  Support devices as above.    I have personally reviewed the examination and initial interpretation  and I agree with the findings.    JOHANN MORAES MD         SYSTEM ID:  [id number]   XR Chest Port 1 View    Narrative    EXAM: XR CHEST PORT 1 VIEW  7/14/2022 12:58 PM    HISTORY: 62 years Female HD line placement.     COMPARISON: Chest radiograph 7/14/2022 5:44 AM. Same-day chest CT  without contrast.    TECHNIQUE: Portable AP view of the chest. Frontal and lateral views of  the chest.    FINDINGS:   Stable chest tube positioning. Partially visualized feeding tube.  Right IJ  central venous catheter is at the mid SVC. Intact clamshell  sternotomy wires. Surgical clips about the chest. Aortic arch  calcification.    Slightly widened mediastinum, similar to prior. Midline trachea.  Stable cardiac silhouette. Indistinct pulmonary vasculature.  Persistent left costophrenic haziness. Tiny left pneumothorax. Stable  lung volumes. Decreased right basilar hazy opacities and persistent  dense retrocardiac opacification. Unremarkable upper abdomen. No acute  or suspicious osseous abnormalities. Unremarkable soft tissues.      Impression    IMPRESSION:   1.  New right IJ CVC tip is at the mid SVC.  2.  Tiny left pneumothorax .  3.  Stable left pleural effusion. Decreased small right pleural  effusion.  4.  Persistent retrocardiac opacification likely representing  pulmonary edema and/or atelectasis.    I have personally reviewed the examination and initial interpretation  and I agree with the findings.    JOHANN MORAES MD         SYSTEM ID:  [id number]   XR Chest/Heart Fluoro    Narrative    EXAM:  XR CHEST/HEART FLUORO 7/14/2022 2:59 PM    INDICATION: S/p lung transplant, evaluate diaphragm function    COMPARISON:  Same-day chest radiograph    FLUOROSCOPY TIME: 0.6 minutes    FINDINGS:  During normal breathing, there is lack of of movement of the right  hemidiaphragm compared to the left. During sniff test there is  paradoxical superior excursion of the right hemidiaphragm. There is  normal excursion of the left hemidiaphragm.    The remaining visualized lung bases and abdomen are fluoroscopically  unremarkable. Partially visualized enteric tube and right central  venous catheter projecting over the low SVC. Intact clamshell  sternotomy wires. Bilateral pleural chest tubes in place.      Impression    IMPRESSION:  Findings consistent with right hemidiaphragm palsy.    I have personally reviewed the examination and initial interpretation  and I agree with the findings.    YANNICK BENAVIDEZ MD          SYSTEM ID:  [id number]   XR Chest Port 1 View    Narrative    EXAM: XR CHEST PORT 1 VIEW  7/14/2022 6:17 PM     HISTORY:  CVC location       COMPARISON:  7/14/2022    FINDINGS  Technique: Semiupright AP view of the chest.     Devices: Right internal jugular catheter terminates over the low SVC.  Clamshell sternotomy wires. Bibasilar chest tubes. Mediastinal drain.  Feeding tube passes inferior to the field of view.    Bibasilar perihilar streaky opacities and left retrocardiac opacity.  Tiny left pneumothorax unchanged. Stable left pleural effusion. Trace  right pleural effusion.    Cardiac silhouette stable in size.       Impression    IMPRESSION:     1. Right central venous catheter terminates over the low SVC.  Additional support devices stable.  2. Stable small/moderate left pleural effusion and trace right pleural  effusion.  3. Stable tiny left pneumothorax.  4. Stable left basilar atelectasis and bibasilar/perihilar atelectasis  versus edema.    I have personally reviewed the examination and initial interpretation  and I agree with the findings.    JOHANN MORAES MD         SYSTEM ID:  [id number]   XR Chest Port 1 View    Narrative    Exam: XR CHEST PORT 1 VIEW, 7/15/2022 6:08 AM    Indication: chest tubes and R hydropneumothorax s/p bilateral lung  transplant    Comparison: 7/14/2022    Findings:   Bibasilar chest tubes are in place. Mediastinal drain is in place.  Right IJ central line tip in the mid superior vena cava. Feeding tube  is seen coursing through the mediastinum tip projects off the film.    No interval change in the apparent loculated pleural effusion at the  left lung base. Small amount of fluid is seen in the fissure on the  right. Biapical pleural fluid is seen. Small left apical pneumothorax.  Heart is upper limits of normal in size. Pulmonary vasculature. By  predominantly interstitial opacities throughout both lungs      Impression    Impression:   1. Stable support devices.  2. Stable  loculated left basilar pleural effusion.  3. Stable left apical hydropneumothorax.  4. Interstitial process throughout both lungs suggests edema.    GABRIELLA SNELL MD         SYSTEM ID:  [id number]   XR Abdomen Port 2 Views    Impression    RESIDENT PRELIMINARY INTERPRETATION  IMPRESSION:   Nonobstructive bowel gas pattern with large amount of stool in the  descending colon, similar to prior. No free air.

## 2022-07-15 NOTE — PLAN OF CARE
ICU End of Shift Summary. See flowsheets for vital signs and detailed assessment.    Changes this shift: Patient alert & oriented x 4, report abdominal pain, prn oxycodone and atarax given with little effect, MD contacted, ordered abdominal xray and stopped tube feed, D 10 drip started at 35 ml/hr, abdomen distended, hyperactive and tender,  afebrile, had multiple episode of SVT and Afib, lasting for a minute or 2, self converted and with valgo maneuver, 12 leads EKG done and shows SVT with abnormal ST, Trop T elevated 540 trending down, now 502, MD notify,  250 Liters of LR bolus given with some improvement in heart rate, less episode of SVT after bolus, on 2 liters nasal canula, refused BIPAP overnight, VBG with PCO2 79 elevated blood glucose, patient on sliding scale insulin, MD notify, montgomery patent, urine output 550 ml this shift, left and right medial chest tube did not put out any output, right pleural chest tube with 800 ml out.    Plan: Continue to monitor per plan of care.    Problem: Heart Failure Comorbidity  Goal: Maintenance of Heart Failure Symptom Control  Intervention: Maintain Heart Failure-Management  Recent Flowsheet Documentation  Taken 7/15/2022 0000 by Leann Handy, RN  Medication Review/Management: medications reviewed  Taken 7/14/2022 2000 by Leann Handy, RN  Medication Review/Management: medications reviewed   Goal Outcome Evaluation:

## 2022-07-16 ENCOUNTER — HEALTH MAINTENANCE LETTER (OUTPATIENT)
Age: 62
End: 2022-07-16

## 2022-07-16 ENCOUNTER — APPOINTMENT (OUTPATIENT)
Dept: GENERAL RADIOLOGY | Facility: CLINIC | Age: 62
DRG: 007 | End: 2022-07-16
Attending: STUDENT IN AN ORGANIZED HEALTH CARE EDUCATION/TRAINING PROGRAM
Payer: MEDICARE

## 2022-07-16 ENCOUNTER — APPOINTMENT (OUTPATIENT)
Dept: PHYSICAL THERAPY | Facility: CLINIC | Age: 62
DRG: 007 | End: 2022-07-16
Attending: THORACIC SURGERY (CARDIOTHORACIC VASCULAR SURGERY)
Payer: MEDICARE

## 2022-07-16 ENCOUNTER — APPOINTMENT (OUTPATIENT)
Dept: GENERAL RADIOLOGY | Facility: CLINIC | Age: 62
DRG: 007 | End: 2022-07-16
Attending: INTERNAL MEDICINE
Payer: MEDICARE

## 2022-07-16 ENCOUNTER — APPOINTMENT (OUTPATIENT)
Dept: OCCUPATIONAL THERAPY | Facility: CLINIC | Age: 62
DRG: 007 | End: 2022-07-16
Attending: THORACIC SURGERY (CARDIOTHORACIC VASCULAR SURGERY)
Payer: MEDICARE

## 2022-07-16 ENCOUNTER — APPOINTMENT (OUTPATIENT)
Dept: GENERAL RADIOLOGY | Facility: CLINIC | Age: 62
DRG: 007 | End: 2022-07-16
Attending: NURSE PRACTITIONER
Payer: MEDICARE

## 2022-07-16 LAB
ANION GAP SERPL CALCULATED.3IONS-SCNC: 5 MMOL/L (ref 7–15)
ATRIAL RATE - MUSE: 94 BPM
BASE EXCESS BLDV CALC-SCNC: 6.5 MMOL/L (ref -7.7–1.9)
BASOPHILS # BLD MANUAL: 0 10E3/UL (ref 0–0.2)
BASOPHILS NFR BLD MANUAL: 0 %
BUN SERPL-MCNC: 66.2 MG/DL (ref 8–23)
CALCIUM SERPL-MCNC: 8.7 MG/DL (ref 8.8–10.2)
CHLORIDE SERPL-SCNC: 97 MMOL/L (ref 98–107)
CREAT SERPL-MCNC: 1.96 MG/DL (ref 0.51–0.95)
DEPRECATED HCO3 PLAS-SCNC: 31 MMOL/L (ref 22–29)
DIASTOLIC BLOOD PRESSURE - MUSE: NORMAL MMHG
EOSINOPHIL # BLD MANUAL: 0.1 10E3/UL (ref 0–0.7)
EOSINOPHIL NFR BLD MANUAL: 1 %
ERYTHROCYTE [DISTWIDTH] IN BLOOD BY AUTOMATED COUNT: 15.9 % (ref 10–15)
GFR SERPL CREATININE-BSD FRML MDRD: 29 ML/MIN/1.73M2
GLUCOSE BLDC GLUCOMTR-MCNC: 104 MG/DL (ref 70–99)
GLUCOSE BLDC GLUCOMTR-MCNC: 135 MG/DL (ref 70–99)
GLUCOSE BLDC GLUCOMTR-MCNC: 146 MG/DL (ref 70–99)
GLUCOSE BLDC GLUCOMTR-MCNC: 150 MG/DL (ref 70–99)
GLUCOSE BLDC GLUCOMTR-MCNC: 152 MG/DL (ref 70–99)
GLUCOSE BLDC GLUCOMTR-MCNC: 190 MG/DL (ref 70–99)
GLUCOSE BLDC GLUCOMTR-MCNC: 220 MG/DL (ref 70–99)
GLUCOSE SERPL-MCNC: 144 MG/DL (ref 70–99)
HCO3 BLDV-SCNC: 35 MMOL/L (ref 21–28)
HCT VFR BLD AUTO: 28.2 % (ref 35–47)
HGB BLD-MCNC: 8.7 G/DL (ref 11.7–15.7)
INTERPRETATION ECG - MUSE: NORMAL
LYMPHOCYTES # BLD MANUAL: 0.3 10E3/UL (ref 0.8–5.3)
LYMPHOCYTES NFR BLD MANUAL: 3 %
MAGNESIUM SERPL-MCNC: 2.3 MG/DL (ref 1.7–2.3)
MCH RBC QN AUTO: 30.7 PG (ref 26.5–33)
MCHC RBC AUTO-ENTMCNC: 30.9 G/DL (ref 31.5–36.5)
MCV RBC AUTO: 100 FL (ref 78–100)
MONOCYTES # BLD MANUAL: 0.3 10E3/UL (ref 0–1.3)
MONOCYTES NFR BLD MANUAL: 3 %
NEUTROPHILS # BLD MANUAL: 9.4 10E3/UL (ref 1.6–8.3)
NEUTROPHILS NFR BLD MANUAL: 93 %
O2/TOTAL GAS SETTING VFR VENT: 30 %
P AXIS - MUSE: 61 DEGREES
PCO2 BLDV: 73 MM HG (ref 40–50)
PH BLDV: 7.29 [PH] (ref 7.32–7.43)
PHOSPHATE SERPL-MCNC: 4.6 MG/DL (ref 2.5–4.5)
PLAT MORPH BLD: ABNORMAL
PLATELET # BLD AUTO: 229 10E3/UL (ref 150–450)
PO2 BLDV: 37 MM HG (ref 25–47)
POLYCHROMASIA BLD QL SMEAR: SLIGHT
POTASSIUM SERPL-SCNC: 4.8 MMOL/L (ref 3.4–5.3)
PR INTERVAL - MUSE: 120 MS
QRS DURATION - MUSE: 80 MS
QT - MUSE: 330 MS
QTC - MUSE: 412 MS
R AXIS - MUSE: 48 DEGREES
RBC # BLD AUTO: 2.83 10E6/UL (ref 3.8–5.2)
RBC MORPH BLD: ABNORMAL
SODIUM SERPL-SCNC: 133 MMOL/L (ref 136–145)
SYSTOLIC BLOOD PRESSURE - MUSE: NORMAL MMHG
T AXIS - MUSE: 160 DEGREES
VENTRICULAR RATE- MUSE: 94 BPM
WBC # BLD AUTO: 10.1 10E3/UL (ref 4–11)

## 2022-07-16 PROCEDURE — 250N000009 HC RX 250: Performed by: SURGERY

## 2022-07-16 PROCEDURE — 36415 COLL VENOUS BLD VENIPUNCTURE: CPT | Performed by: NURSE PRACTITIONER

## 2022-07-16 PROCEDURE — 74018 RADEX ABDOMEN 1 VIEW: CPT

## 2022-07-16 PROCEDURE — 250N000013 HC RX MED GY IP 250 OP 250 PS 637: Performed by: SURGERY

## 2022-07-16 PROCEDURE — 80048 BASIC METABOLIC PNL TOTAL CA: CPT | Performed by: NURSE PRACTITIONER

## 2022-07-16 PROCEDURE — 97530 THERAPEUTIC ACTIVITIES: CPT | Mod: GO | Performed by: OCCUPATIONAL THERAPIST

## 2022-07-16 PROCEDURE — 97110 THERAPEUTIC EXERCISES: CPT | Mod: GP | Performed by: REHABILITATION PRACTITIONER

## 2022-07-16 PROCEDURE — 99233 SBSQ HOSP IP/OBS HIGH 50: CPT | Mod: 24 | Performed by: NURSE PRACTITIONER

## 2022-07-16 PROCEDURE — 250N000013 HC RX MED GY IP 250 OP 250 PS 637: Performed by: THORACIC SURGERY (CARDIOTHORACIC VASCULAR SURGERY)

## 2022-07-16 PROCEDURE — 250N000011 HC RX IP 250 OP 636: Performed by: INTERNAL MEDICINE

## 2022-07-16 PROCEDURE — 250N000013 HC RX MED GY IP 250 OP 250 PS 637: Performed by: STUDENT IN AN ORGANIZED HEALTH CARE EDUCATION/TRAINING PROGRAM

## 2022-07-16 PROCEDURE — 94640 AIRWAY INHALATION TREATMENT: CPT

## 2022-07-16 PROCEDURE — 82803 BLOOD GASES ANY COMBINATION: CPT | Performed by: NURSE PRACTITIONER

## 2022-07-16 PROCEDURE — 85007 BL SMEAR W/DIFF WBC COUNT: CPT | Performed by: NURSE PRACTITIONER

## 2022-07-16 PROCEDURE — 94640 AIRWAY INHALATION TREATMENT: CPT | Mod: 76

## 2022-07-16 PROCEDURE — 97530 THERAPEUTIC ACTIVITIES: CPT | Mod: GP | Performed by: REHABILITATION PRACTITIONER

## 2022-07-16 PROCEDURE — 83735 ASSAY OF MAGNESIUM: CPT | Performed by: SURGERY

## 2022-07-16 PROCEDURE — 74018 RADEX ABDOMEN 1 VIEW: CPT | Mod: 26 | Performed by: RADIOLOGY

## 2022-07-16 PROCEDURE — 250N000012 HC RX MED GY IP 250 OP 636 PS 637: Performed by: PHYSICIAN ASSISTANT

## 2022-07-16 PROCEDURE — 250N000012 HC RX MED GY IP 250 OP 636 PS 637: Performed by: THORACIC SURGERY (CARDIOTHORACIC VASCULAR SURGERY)

## 2022-07-16 PROCEDURE — 99233 SBSQ HOSP IP/OBS HIGH 50: CPT | Mod: 24 | Performed by: INTERNAL MEDICINE

## 2022-07-16 PROCEDURE — 250N000013 HC RX MED GY IP 250 OP 250 PS 637: Performed by: NURSE PRACTITIONER

## 2022-07-16 PROCEDURE — 120N000002 HC R&B MED SURG/OB UMMC

## 2022-07-16 PROCEDURE — 84100 ASSAY OF PHOSPHORUS: CPT | Performed by: SURGERY

## 2022-07-16 PROCEDURE — 94660 CPAP INITIATION&MGMT: CPT

## 2022-07-16 PROCEDURE — 71046 X-RAY EXAM CHEST 2 VIEWS: CPT | Mod: 26 | Performed by: RADIOLOGY

## 2022-07-16 PROCEDURE — 90937 HEMODIALYSIS REPEATED EVAL: CPT

## 2022-07-16 PROCEDURE — 999N000157 HC STATISTIC RCP TIME EA 10 MIN

## 2022-07-16 PROCEDURE — 99233 SBSQ HOSP IP/OBS HIGH 50: CPT | Performed by: STUDENT IN AN ORGANIZED HEALTH CARE EDUCATION/TRAINING PROGRAM

## 2022-07-16 PROCEDURE — 250N000013 HC RX MED GY IP 250 OP 250 PS 637

## 2022-07-16 PROCEDURE — 74018 RADEX ABDOMEN 1 VIEW: CPT | Mod: 77

## 2022-07-16 PROCEDURE — 85027 COMPLETE CBC AUTOMATED: CPT | Performed by: NURSE PRACTITIONER

## 2022-07-16 PROCEDURE — 71046 X-RAY EXAM CHEST 2 VIEWS: CPT

## 2022-07-16 PROCEDURE — 250N000011 HC RX IP 250 OP 636: Performed by: SURGERY

## 2022-07-16 PROCEDURE — 258N000003 HC RX IP 258 OP 636: Performed by: CLINICAL NURSE SPECIALIST

## 2022-07-16 PROCEDURE — 250N000011 HC RX IP 250 OP 636

## 2022-07-16 RX ORDER — SIMETHICONE 80 MG
80 TABLET,CHEWABLE ORAL 4 TIMES DAILY
Status: DISCONTINUED | OUTPATIENT
Start: 2022-07-16 | End: 2022-07-23

## 2022-07-16 RX ORDER — MIDODRINE HYDROCHLORIDE 5 MG/1
5 TABLET ORAL EVERY 8 HOURS
Status: DISCONTINUED | OUTPATIENT
Start: 2022-07-16 | End: 2022-07-25

## 2022-07-16 RX ORDER — HYDROMORPHONE HYDROCHLORIDE 1 MG/ML
0.3 INJECTION, SOLUTION INTRAMUSCULAR; INTRAVENOUS; SUBCUTANEOUS ONCE
Status: COMPLETED | OUTPATIENT
Start: 2022-07-16 | End: 2022-07-16

## 2022-07-16 RX ADMIN — HYDROXYZINE HYDROCHLORIDE 50 MG: 25 TABLET ORAL at 05:30

## 2022-07-16 RX ADMIN — TACROLIMUS 5 MG: 5 CAPSULE ORAL at 17:37

## 2022-07-16 RX ADMIN — NYSTATIN 1000000 UNITS: 100000 SUSPENSION ORAL at 08:31

## 2022-07-16 RX ADMIN — HYDROXYZINE HYDROCHLORIDE 50 MG: 25 TABLET ORAL at 19:08

## 2022-07-16 RX ADMIN — Medication 12.5 MG: at 08:30

## 2022-07-16 RX ADMIN — NYSTATIN: 100000 CREAM TOPICAL at 20:19

## 2022-07-16 RX ADMIN — MIDODRINE HYDROCHLORIDE 5 MG: 5 TABLET ORAL at 23:40

## 2022-07-16 RX ADMIN — THIAMINE HCL TAB 100 MG 100 MG: 100 TAB at 08:31

## 2022-07-16 RX ADMIN — Medication: at 12:49

## 2022-07-16 RX ADMIN — INSULIN ASPART 1 UNITS: 100 INJECTION, SOLUTION INTRAVENOUS; SUBCUTANEOUS at 08:43

## 2022-07-16 RX ADMIN — THERA TABS 1 TABLET: TAB at 08:31

## 2022-07-16 RX ADMIN — MIDODRINE HYDROCHLORIDE 5 MG: 5 TABLET ORAL at 16:30

## 2022-07-16 RX ADMIN — ACETYLCYSTEINE 2 ML: 200 SOLUTION ORAL; RESPIRATORY (INHALATION) at 21:37

## 2022-07-16 RX ADMIN — INSULIN GLARGINE 15 UNITS: 100 INJECTION, SOLUTION SUBCUTANEOUS at 20:27

## 2022-07-16 RX ADMIN — NYSTATIN 1000000 UNITS: 100000 SUSPENSION ORAL at 11:23

## 2022-07-16 RX ADMIN — NYSTATIN 1000000 UNITS: 100000 SUSPENSION ORAL at 16:32

## 2022-07-16 RX ADMIN — Medication 5 MG: at 12:15

## 2022-07-16 RX ADMIN — Medication 5 MG: at 07:01

## 2022-07-16 RX ADMIN — ACETAMINOPHEN 975 MG: 325 TABLET, FILM COATED ORAL at 16:31

## 2022-07-16 RX ADMIN — INSULIN ASPART 1 UNITS: 100 INJECTION, SOLUTION INTRAVENOUS; SUBCUTANEOUS at 03:28

## 2022-07-16 RX ADMIN — MYCOPHENOLATE MOFETIL 1000 MG: 200 POWDER, FOR SUSPENSION ORAL at 08:33

## 2022-07-16 RX ADMIN — VANCOMYCIN HYDROCHLORIDE 125 MG: KIT at 23:40

## 2022-07-16 RX ADMIN — NYSTATIN 1000000 UNITS: 100000 SUSPENSION ORAL at 19:57

## 2022-07-16 RX ADMIN — LEVALBUTEROL HYDROCHLORIDE 1.25 MG: 1.25 SOLUTION RESPIRATORY (INHALATION) at 16:16

## 2022-07-16 RX ADMIN — SIMETHICONE 80 MG: 80 TABLET, CHEWABLE ORAL at 07:01

## 2022-07-16 RX ADMIN — HYDROMORPHONE HYDROCHLORIDE 0.3 MG: 1 INJECTION, SOLUTION INTRAMUSCULAR; INTRAVENOUS; SUBCUTANEOUS at 20:19

## 2022-07-16 RX ADMIN — LEVALBUTEROL HYDROCHLORIDE 1.25 MG: 1.25 SOLUTION RESPIRATORY (INHALATION) at 21:37

## 2022-07-16 RX ADMIN — INSULIN ASPART 4 UNITS: 100 INJECTION, SOLUTION INTRAVENOUS; SUBCUTANEOUS at 16:35

## 2022-07-16 RX ADMIN — CALCIUM CARBONATE 600 MG (1,500 MG)-VITAMIN D3 400 UNIT TABLET 1 TABLET: at 08:31

## 2022-07-16 RX ADMIN — HEPARIN SODIUM 5000 UNITS: 5000 INJECTION, SOLUTION INTRAVENOUS; SUBCUTANEOUS at 05:30

## 2022-07-16 RX ADMIN — ACETAMINOPHEN 975 MG: 325 TABLET, FILM COATED ORAL at 19:57

## 2022-07-16 RX ADMIN — PROCHLORPERAZINE EDISYLATE 10 MG: 5 INJECTION INTRAMUSCULAR; INTRAVENOUS at 07:00

## 2022-07-16 RX ADMIN — Medication 12.5 MG: at 19:57

## 2022-07-16 RX ADMIN — Medication 5 MG: at 02:56

## 2022-07-16 RX ADMIN — Medication 40 MG: at 08:32

## 2022-07-16 RX ADMIN — Medication 1 PACKET: at 08:34

## 2022-07-16 RX ADMIN — VANCOMYCIN HYDROCHLORIDE 125 MG: KIT at 17:36

## 2022-07-16 RX ADMIN — MIDODRINE HYDROCHLORIDE 10 MG: 5 TABLET ORAL at 08:31

## 2022-07-16 RX ADMIN — ACETAMINOPHEN 975 MG: 325 TABLET, FILM COATED ORAL at 08:30

## 2022-07-16 RX ADMIN — CALCIUM CARBONATE (ANTACID) CHEW TAB 500 MG 1000 MG: 500 CHEW TAB at 19:08

## 2022-07-16 RX ADMIN — Medication 5 MG: at 15:58

## 2022-07-16 RX ADMIN — PREDNISONE 15 MG: 5 TABLET ORAL at 08:31

## 2022-07-16 RX ADMIN — CALCIUM CARBONATE (ANTACID) CHEW TAB 500 MG 1000 MG: 500 CHEW TAB at 12:15

## 2022-07-16 RX ADMIN — VANCOMYCIN HYDROCHLORIDE 125 MG: KIT at 05:30

## 2022-07-16 RX ADMIN — CALCIUM CARBONATE 600 MG (1,500 MG)-VITAMIN D3 400 UNIT TABLET 1 TABLET: at 17:36

## 2022-07-16 RX ADMIN — SIMETHICONE 80 MG: 80 TABLET, CHEWABLE ORAL at 19:57

## 2022-07-16 RX ADMIN — Medication 1 CAPSULE: at 08:30

## 2022-07-16 RX ADMIN — ASPIRIN 81 MG CHEWABLE TABLET 81 MG: 81 TABLET CHEWABLE at 08:30

## 2022-07-16 RX ADMIN — INSULIN ASPART 3 UNITS: 100 INJECTION, SOLUTION INTRAVENOUS; SUBCUTANEOUS at 20:23

## 2022-07-16 RX ADMIN — ONDANSETRON 4 MG: 4 TABLET, ORALLY DISINTEGRATING ORAL at 08:27

## 2022-07-16 RX ADMIN — HEPARIN SODIUM 5000 UNITS: 5000 INJECTION, SOLUTION INTRAVENOUS; SUBCUTANEOUS at 16:31

## 2022-07-16 RX ADMIN — Medication 40 MG: at 20:19

## 2022-07-16 RX ADMIN — Medication 1 CAPSULE: at 19:59

## 2022-07-16 RX ADMIN — SODIUM CHLORIDE 300 ML: 9 INJECTION, SOLUTION INTRAVENOUS at 12:48

## 2022-07-16 RX ADMIN — TACROLIMUS 5 MG: 5 CAPSULE ORAL at 11:51

## 2022-07-16 RX ADMIN — INSULIN GLARGINE 15 UNITS: 100 INJECTION, SOLUTION SUBCUTANEOUS at 08:43

## 2022-07-16 RX ADMIN — ACETYLCYSTEINE 2 ML: 200 SOLUTION ORAL; RESPIRATORY (INHALATION) at 16:16

## 2022-07-16 RX ADMIN — SIMETHICONE 80 MG: 80 TABLET, CHEWABLE ORAL at 16:31

## 2022-07-16 RX ADMIN — ONDANSETRON 4 MG: 2 INJECTION INTRAMUSCULAR; INTRAVENOUS at 01:38

## 2022-07-16 RX ADMIN — MYCOPHENOLATE MOFETIL 1000 MG: 200 POWDER, FOR SUSPENSION ORAL at 20:19

## 2022-07-16 RX ADMIN — HEPARIN SODIUM 5000 UNITS: 5000 INJECTION, SOLUTION INTRAVENOUS; SUBCUTANEOUS at 22:24

## 2022-07-16 RX ADMIN — NYSTATIN: 100000 CREAM TOPICAL at 11:24

## 2022-07-16 RX ADMIN — PREDNISONE 15 MG: 5 TABLET ORAL at 19:59

## 2022-07-16 RX ADMIN — VANCOMYCIN HYDROCHLORIDE 125 MG: KIT at 11:23

## 2022-07-16 RX ADMIN — SODIUM CHLORIDE 250 ML: 9 INJECTION, SOLUTION INTRAVENOUS at 12:48

## 2022-07-16 ASSESSMENT — ACTIVITIES OF DAILY LIVING (ADL)
ADLS_ACUITY_SCORE: 38

## 2022-07-16 NOTE — PLAN OF CARE
ICU End of Shift Summary. See flowsheets for vital signs and detailed assessment.    Changes this shift: Oriented and able to make needs known. Sleepy and lethargic this AM and has mild aphasia and was so to respond, but it much more alert this afternoon. VSS on 1/2 to 1 lpm nasal cannula. Bilateral chest tubes to -20 suction without air leaks. Patient continues to complain of abdominal discomfort/pressure and nausea despite medications. NG to LIS. Gastric emptying study ordered by MD to further evaluate high amount of gastric output. Daughter at bedside and attentive to patient. Dong removed, due to void. Tolerated HD with cardiac arhythmia, 1 litter removed. Up to chair x1.     Plan: Continue to monitor and follow POC.

## 2022-07-16 NOTE — PLAN OF CARE
ICU End of Shift Summary. See flowsheets for vital signs and detailed assessment.    Changes this shift: Patient had an uneventful shift, prn oxycodone and Zofran given, tele Sinus R, -140's, tolerated BIPAP for 5 - 6 hours overnight, now on 2 liters oxy mask, NG with zero output overnight, Tube feed at 25 ml/hr, RN to advance by 10 every 8 hours, low urine output.    Plan:  [place] [facility] [place].    Problem: Pain (Lung Surgery)  Goal: Acceptable Pain Control  Intervention: Prevent or Manage Pain  Recent Flowsheet Documentation  Taken 7/16/2022 0400 by Leann Handy, RN  Pain Management Interventions: medication (see MAR)     Problem: Postoperative Nausea and Vomiting (Lung Surgery)  Goal: Nausea and Vomiting Relief  Intervention: Prevent or Manage Nausea and Vomiting  Recent Flowsheet Documentation  Taken 7/16/2022 0400 by Leann Handy, RN  Nausea/Vomiting Interventions:    antiemetic    cool cloth applied    slow deep breathing encouraged  Taken 7/15/2022 2000 by Leann Handy, RN  Nausea/Vomiting Interventions:    antiemetic    cool cloth applied    slow deep breathing encouraged   Goal Outcome Evaluation:

## 2022-07-16 NOTE — PROGRESS NOTES
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 Sunshine. This was complicated by left upper extremity acute limb ischemia s/p left radial thrombectomy on 7/1/22. Recovering renal function, will hold off on tunneled dialysis line at this time.      - Right Pleural tube remains to suction, elevated output, can ambulate off suction.   - Left pleural tube remains to suction for elevated output, stripped tube 7/15 with immediate 150 mL output. Left tube site needs Vaseline gauze gauze wrapped around tube at the skin to avoid air leaking into chest. Can ambulate off suction.   - Removed pericardial tube 7/15 without immediate complication.     Discussed with Dr Rob Martell PA-C  Cardiothoracic Surgery  p: [phone]

## 2022-07-16 NOTE — PROGRESS NOTES
HEMODIALYSIS TREATMENT NOTE    Date: 7/16/2022  Time: 4:21 PM    Data:  Pre Wt:71.4     Desired Wt: 70.4  kg   Post Wt:  70.4  Weight change: 1  kg  Ultrafiltration - Post Run Net Total Removed (mL): 1000 mL  Vascular Access Status: patent  Dialyzer Rinse: Light  Total Blood Volume Processed: 60.5 L Liters  Total Dialysis (Treatment) Time: 3hrs     Lab:   no    Interventions:Assessments  Pt dialyzed today for 3hrs on a K2 Ca2.5 bath via RIJ. 300-400Qb throughout. 1Kg of fluid removed. Sinus tach in the 90's throughout. Daughter at bedside for HD. Bp stable throughout. HD education provided at bedside to pt and family. See Epic for VS details. Report to Delmi RITTER post run.     Plan:    Per renal

## 2022-07-16 NOTE — PROGRESS NOTES
[facility]    Medicine Progress Note - Hospitalist Service, GOLD TEAM 10    Date of Admission:  6/28/2022    Assessment & Plan            Sofie Rodriguez is a 62 year old female admitted on 6/28/2022. She has PMH of end stage COPD s/p b/l lung transplant on 6/28/2022, HTN, HFpEF, h/o hepC, oteopenia, and former methamphetamine use, and she is currently transferring to [place] [address] [facility] from MICU on 7/15/2022. She was admitted to the MICU on 7/13/20222 with prior hospital course complicated by left upper extremity acute limb ischemia s/p left radial thrombectomy on 7/1/2022. She was primarily treated for acute hypercapnic respiratory failure on intermittent BIPAP with worsening BACILIO while in the MICU. Currently stable on 2L NC.     Summary of today's plan:   - Clamp NG and see if patient tolerates; may be able to pull this afternoon. If abdominal pain/nausea return, continue to LIMS and re-assess tomorrow.   - AXR shows improvement in gastric distension  - Continue to encourage OOB and up in chair  - BIPAP at night   - Decrease midodrine         #End stage COPD s/p BOLT 6/28/2022  #Acute hypercapnic respiratory failure, likely 2/2 decreased central respiratory drive vs respiratory muscle weakness  Patient received bilateral lung transplant for end-stage COPD on 6/28. Chart review shows hypercarbia starting on 7/2 that was not adequately compensated by respiration. On prior exam, patient noted to take shallow, infrequent breaths with occasional episodes of hyperventilation. Ddx for hypercapnic respiratory failure currently includes decreased central respiratory drive, respiratory muscle weakness, and intrinsic lung pathology. Low concern for encephalitis given patient is on minimal sedating medications with appropriate mental status. TSH normal last week, so unlikely hypothyroidism. Bedside US in MICU showed normal diaphragmatic movement, though formal SNIFF test was  performed on 7/14 showed R hemidiaphragm palsy. Of note transplanted lungs were also considered small for body size and could contribute to decreased respiratory compensation for hypercarbia. BIPAP improved patient's pH, and last ABG on 7/13 showed pH of 7.35 while on nasal cannula. Elevated pCO2 and bicarb still present. Most recent bronch 7/12 for clearance/inspection given weak cough. VBG showed improved CO2 immediately after HD yesterday, but was back to previous this AM. Patient did not use BIPAP last night due to pain (nonspecific). Supp O2 decreased from 2L to RA this morning.   - f/u bronch studies from 7/12  - trend VBG  - Continue BIPAP at night   - NC (though patient was not needing when last seen) with intermittent BIPAP for night and daytime naps; goal to keep O2sat above 92%  - f/u myasthenia gravis panel  - oxycodone decreased from 5 to 2.5-5mg q4h PRN     #Post-transplant  Immunosuppression:  - Prednisone 15mg BID  - Tacrolimus 5mg BID  - MMF 1000mg BID  Prophylasxis:  - CMV - Valgancyclover  - Thrush - PO nysatin  - PJP - TMP-SMX (currently held given BACILIO)     #Chest tubes  - 2 chest tubes (basilar) in place currently (pericardial drain was removed today). CT chest 7/14 showing unchanged bilateral effusions and unchanged biapical pneumothoraces with resolved left basilar pneumothorax; bibasilar chest tubes in place with pericardial drain (which was removed this morning).   - daily CXR  - CV surgery and transplant pulm following     #Shock of unclear etiology, improving  #H/o HTN  #H/o HFpEF  Previously on levophed until 7/10. Last echo 7/7 unchanged from previous showing normal EF with good LV function, so unlikely cardiogenic shock. S/p hydrocortisone 7/6-7/9 and fludrocortisone 7/607/11 without significant improvement, so unlikely adrenal insufficiency. Had 1 episode of BP down to 90s this morning, but was otherwise around 120-130s systolic today (7/15).   - Decrease Midodrine to 10mg TID (from  15mg TID)  - Holding PTA lasix 20mg daily     #C.diff  #Abdominal pain   Worsening diarrhea via rectal pouch with new onset abd pain that is significantly worse today (7/15). C. Diff positive on 7/11 with vanc started on 7/12.   - PO vanc 125mg QID (7/12-7/26)  - holding bowel regimen  - abd ct showed no signs of toxic megacolon. Very distended stomach and NG was placed 7/15 for decompression.      #NJ tube  Currently NPO given abd pain, will re-evaluate after ct abd today (7/15).   Feeding regimen:   - Adult Formula Drip Feeding: Continuous Novasource Renal; Nasojejunal; Goal Rate: 35; initiate at goal rate; mL/hr; Medication - Feeding Tube Flush Frequency: At least 15-30 mL water before and after medication administration and with tube clogging     #BACILIO  #Hypermagnesemia  #Hyperphosphatemia  Baseline renal function was normal prior to surgery. New onset renal failure after transplant, likely multifactorial due to pre-renal hypotension (2.5h bypass during surgery) and intra-renal from use of nephrotoxic agents including tacrolimus and vancomycin. 6/28 UA showed hyaline casts. Cr initially improved but now rising again along with BUN. Nephrology is on consult and has patient on intermittent HD starting 7/14 via non-tunneled R internal jugular cath. HD on 7/14 was limited by tachyarrhythmia. Patient is scheduled for another session today.   - nephrology following  - R internal jugular cathether placed with HD line on 7/14, second dialysis session today  - discontinued sodium bicarb tabs     #Tachyarrthymia  SVT vs afib. Previously appeared to be positional. Had an occurrence during HD on 7/14. No episodes today 7/15.   - Not on beta blocker currently given BP needing midodrine     #Stress-induced hyperglycemia  Blood glucose was consistently in high 100s to mid 200s over past 2 days. Insulin was held while patient was NPO. Received half dose glargine this AM.   - Previously on 15U glargine BID; will re-evaluate  "insulin need pending CT abd today     #Anemia  Initially from acute blood loss. Hb dropped to 6.8 on 7/14, requiring 1U pRBC. Post-transfusion Hb 9.4 > 8.3 (7/15).   - continue to monitor  - transfuse for hgb<7       Diet: Adult Formula Drip Feeding: Continuous Novasource Renal; Nasojejunal; Goal Rate: 35; initiate at 15 ml/hr and advance by 10 mL Q8H to goal; mL/hr; Medication - Feeding Tube Flush Frequency: At least 15-30 mL water before and after medication admin...  Regular Diet Adult    DVT Prophylaxis: Heparin SQ  Dong Catheter: Not present  Central Lines: PRESENT  CVC Double Lumen Right Internal jugular Non - tunneled-Site Assessment: WDL  Cardiac Monitoring: None  Code Status: Full Code      Disposition Plan         The patient's care was discussed with the Bedside Nurse and Patient.    Martha Mahan MD  Hospitalist Service, GOLD TEAM [address] [place] [address]  Securely message with the Vocera Web Console (learn more here)  Text page via [facility] Paging/Directory   Please see signed in provider for up to date coverage information      Clinically Significant Risk Factors Present on Admission                      ______________________________________________________________________    Interval History   No events overnight  Abdominal pain a bit better today. Still feels \"sore all over\". Breathing is about the same. Stool output has been slowing down.     4 point ROS otherwise negative.     Data reviewed today: I reviewed all medications, new labs and imaging results over the last 24 hours. I personally reviewed the AXR image(s) showing improved gastric distension.    Physical Exam   Vital Signs: Temp: 97.6  F (36.4  C) Temp src: Axillary BP: 124/71 Pulse: 100   Resp: 17 SpO2: 100 % O2 Device: Nasal cannula Oxygen Delivery: 1 LPM  Weight: 157 lbs 6.54 oz    General: Awake, alert, no distress.   Eyes: Sclera anicteric. No conjunctival injection. PERRLA.   Mouth: Oropharynx " benign, no tonsillar exudate. MMM.   Neck: Supple, full ROM, no adenopathy.  CV: Normal rate and rhythm, normal S1 and S2. No murmurs, rubs, gallops. Radial pulses 2+ and symmetric.  Respiratory: No accessory muscle use. Lungs diminished bilaterally. No wheezing, rhonchi, or rales.  Abdomen: Soft, non-distended. Non-tender to palpation. No rebound tenderness or guarding. +BS.   MSK: No joint redness, deformity or swelling.   Extremities: Pitting edema to the knees bilaterally   Skin: Warm, dry. No rash on visible skin.   Neuro: Alert, oriented to conversation and situation. Face symmetric, speech intact. Moves all extremities.   Psych: Flat affect.       Data   Recent Labs   Lab 07/16/22  1126 07/16/22  0843 07/16/22  0531 07/15/22  0809 07/15/22  0422 07/15/22  0420 07/14/22  2356 07/14/22  2234 07/14/22  2023 07/14/22  1901 07/14/22  0752 07/14/22  0459   WBC  --   --  10.1  --  9.8  --   --   --   --  10.3  --  10.5   HGB  --   --  8.7*  --  8.3*  --   --   --   --  9.4*  --  6.8*   MCV  --   --  100  --  102*  --   --   --   --  101*  --  102*   PLT  --   --  229  --  262  --   --   --   --  295  --  274   NA  --   --  133*  --   --  135*  --  134*  --   --    < > 140   POTASSIUM  --   --  4.8  --   --  4.6  --  4.4  --   --    < > 4.3   CHLORIDE  --   --  97*  --   --  93*  --  92*  --   --    < > 92*   CO2  --   --  31*  --   --  34*  --  36*  --   --    < > 42*   BUN  --   --  66.2*  --   --  123.0*  --  119.0*  --   --    < > 155.0*   CR  --   --  1.96*  --   --  2.65*  --  2.66*  --   --    < > 3.00*   ANIONGAP  --   --  5*  --   --  8  --  6*  --   --    < > 6*   ESTUARDO  --   --  8.7*  --   --  8.7*  --  8.5*  --   --    < > 8.6*   * 150* 144*   < >  --  245*   < > 207*   < >  --    < > 200*  193*   ALBUMIN  --   --   --   --   --  2.9*  --   --   --   --   --  2.6*   PROTTOTAL  --   --   --   --   --  4.9*  --   --   --   --   --  4.6*   BILITOTAL  --   --   --   --   --  0.2  --   --   --   --   --   0.2   ALKPHOS  --   --   --   --   --  74  --   --   --   --   --  64   ALT  --   --   --   --   --  32  --   --   --   --   --  22   AST  --   --   --   --   --  31  --   --   --   --   --  22   LIPASE  --   --   --   --   --  21  --   --   --   --   --   --     < > = values in this interval not displayed.     Recent Results (from the past 24 hour(s))   XR Abdomen Port 1 View    Narrative    Abdomen one view portable 7/15/2022 at 1754 hours    INDICATION: Check NG tube placement    COMPARISON: CT earlier today. Plain film earlier today 0605 hours    FINDINGS: Feeding tube tip is in the distal duodenum. NG tube tip and  sidehole projected in the stomach. Nonobstructive bowel gas pattern.  Clamshell sternotomy wires partially imaged. Left basilar thoracostomy  tube.      Impression    IMPRESSION: Feeding tube and NG tube as described.    ALVINA HARRY MD         SYSTEM ID:  [id number]   XR Chest 2 Views    Narrative    EXAM: XR CHEST 2 VW  7/16/2022 8:21 AM      HISTORY: Interval f/u s/p transplant    COMPARISON: 7/15/2022    FINDINGS: Two views of the chest. Postoperative changes of bilateral  lung transplantation. Stable bibasilar chest tubes. Right IJ central  venous catheter tip in the mid SVC. Feeding tube and enteric tube are  subdiaphragmatic. Interval removal of mediastinal drain.    Trachea is midline. Stable size of the cardiomediastinal silhouette.  No focal pulmonary opacity. Decreased pleural fluid in the left base.  Stable small amount of biapical pleural fluid/pleural thickening.  Slightly prominent interstitial markings. Perihilar and bibasilar  atelectasis. Small left apical pneumothorax. No appreciable  right-sided pneumothorax.      Impression    IMPRESSION:    1. Postoperative chest with interval removal of mediastinal drain in  stable position of bibasilar chest tubes. Remaining tubes and lines as  described above.  2. Decreased left basilar effusion.  3. Stable small left apical  pneumothorax.  4. Mild pulmonary edema and atelectasis.    I have personally reviewed the examination and initial interpretation  and I agree with the findings.    YANNICK BENAVIDEZ MD         SYSTEM ID:  [id number]   XR Abdomen Port 1 View    Narrative    Exam: XR ABDOMEN PORT 1 VIEWS, 7/16/2022 9:44 AM    Indication: Check interval change in gastric distension, any signs of  obstruction    Comparison: Abdominal radiograph 7/15/2022.    Findings:   Supine portable abdominal radiograph. Nasogastric tube tip and  sidehole overlying the stomach. Feeding tube has been advanced with  tip present towards the DJ flexure. Decreased gaseous distention of  the stomach with scattered contrast and debris in the lumen. No  significant gaseous distention of small or large bowel loops. No acute  osseous abnormalities.    Bilateral chest tubes in the lung bases partially visualized. Please  see separate chest radiographs.      Impression    Impression:   1.  Nasogastric tube tip and sidehole in the stomach.  2.  Feeding tube tip towards the DJ flexure.  3.  Decreased distention of the stomach with some residual contrast  material and debris in the lumen noted.    YANNICK BENAVIDEZ MD         SYSTEM ID:  [id number]     Medications     dextrose Stopped (07/15/22 1801)       acetaminophen  975 mg Oral or Feeding Tube TID     acetylcysteine  2 mL Nebulization 4x Daily     alteplase  1 mg Intravenous Once     aspirin  81 mg Oral Daily     calcium carbonate 600 mg-vitamin D 400 units  1 tablet Oral BID w/meals     ceFAZolin  2 g Intravenous Pre-Op/Pre-procedure x 1 dose     heparin ANTICOAGULANT  5,000 Units Subcutaneous Q8H TONY     insulin aspart  1-12 Units Subcutaneous Q4H     insulin glargine  15 Units Subcutaneous BID     lactobacillus rhamnosus (GG)  1 capsule Oral BID     levalbuterol  1.25 mg Nebulization 4x Daily     lidocaine (PF)  10 mL Subcutaneous Once     metoprolol tartrate  12.5 mg Oral BID     midodrine  5 mg Oral or  Feeding Tube Q8H     multivitamin, therapeutic  1 tablet Per Feeding Tube Daily     mycophenolate  1,000 mg Oral or Feeding Tube BID     nystatin   Topical BID     nystatin  1,000,000 Units Swish & Swallow 4x Daily     pantoprazole  40 mg Oral or Feeding Tube BID     predniSONE  15 mg Per Feeding Tube BID     protein modular  1 packet Per Feeding Tube Daily     simethicone  80 mg Oral 4x Daily     sodium chloride (PF)  3 mL Intracatheter Q8H     sodium chloride (PF)  3 mL Intracatheter Q8H     sodium chloride (PF)  9 mL Intracatheter During Dialysis/CRRT (from stock)     sodium chloride (PF)  9 mL Intracatheter During Dialysis/CRRT (from stock)     tacrolimus  5 mg Per Feeding Tube BID IS     thiamine  100 mg Oral or Feeding Tube Daily     valGANciclovir  450 mg Oral or NG Tube Once per day on Mon Thu     vancomycin  125 mg Oral or Feeding Tube Q6H TONY

## 2022-07-16 NOTE — PROGRESS NOTES
"Nephrology Inpatient Visit Note    Reason for Visit  Acute kidney injury requiring dialysis    Subjective   The following portions of the patient's chart were reviewed: current medications, problem list, laboratory workup, and diagnostic data.    She feels okay this morning.  She had atrial fibrillation with RVR during the hemodialysis session yesterday.  She is more awake in comparison to the last 2 days.    Objective   Vital Signs  Blood pressure 113/66, pulse 74, temperature 98.2  F (36.8  C), temperature source Oral, resp. rate 10, height 1.575 m (5' 2\"), weight 71.4 kg (157 lb 6.5 oz), SpO2 98 %, not currently breastfeeding.  I/O last 3 completed shifts:  In: 1386.83 [I.V.:411.83; NG/GT:730]  Out: 2500 [Urine:390; Emesis/NG output:700; Other:250; Stool:100; Chest Tube:1060]    Physical Exam   Gen: Comfortable, conversant    Lungs: CTAB     Abdomen: Soft, and non-tender    Ext: Some lower extremity edema        Diagnostic Data  Lab Results   Component Value Date    WBC 10.1 07/16/2022    HCT 28.2 (L) 07/16/2022     07/16/2022     07/16/2022     (L) 07/16/2022    POTASSIUM 4.8 07/16/2022    HCO3 46 (HH) 07/13/2022    BUN 66.2 (H) 07/16/2022    ALBUMIN 2.9 (L) 07/15/2022       Assessment & Plan   Acute kidney injury    62 yom with hx of HTN, Hep C, osetopenia, previous polysubstance use (stopped   2019) and severe COPD who is s/p BSLT on 6/28/2022.      The patient was started on hemodialysis on 7/14 given rising creatinine and slow clearance.  Plan to dialyze the patient 3 days in a row.  Please ensure that all medications are renally adjusted.  She remains nonoliguric and has a decent chance of kidney recovery.     The plan of care was discussed with the patient, nursing staff, and primary service. Total time is 35 minutes. More than 50 percent of my time was spent on counseling or coordination of care.    Total time is 35 minutes        "

## 2022-07-16 NOTE — PROGRESS NOTES
Pulmonary Medicine  Cystic Fibrosis - Lung Transplant Team  Progress Note  2022       Patient: Sofie Rodriguez  MRN: [id number]  : 1960 (age 62 year old)  Transplant: 2022 (Lung), POD#18  Admission date: 2022    Assessment & Plan:     Sofie Rodriguez is a 62 year old female with a PMH significant for end-stage COPD, HTN, HFpEF, Mycobacterium peregrinum colonization, h/o hepatitis C, HECTOR s/p LEEP procedure, osteopenia, and former methamphetamine use.  Pt. is now s/p BSLT on 22, lungs slightly undersized.   Persistent low dose pressor needs post-op through 7/10.  Extubated POD #2 but with persistent hypercapnia, mostly compensated and only slightly improved with intermittent NIPPV.  Also with left radial artery thrombus (presumed secondary to arterial line) s/p thrombectomy , BACILIO, and C diff.      Today's recommendations:  - CPT QID, encourage consistent participation d/t concern for ineffective airway clearance (only complete 2 of 4 therapies yesterday)  - Wean O2 as able to keep >92%, NIPPV overnight as tolerated given persistent hypercapnia  - Aggressive volume removal as able, consider trial of diuretic (decreased UO)  - CXR 2-view daily while chest tubes remain  - Encourage increased activity including up in chair (minimal day time in bed) and active participation in PT/OT given concern for deconditioning and low activity level (not sure if this is limited by physical and/or mental barriers)  - Tacrolimus level   - Prednisone taper next due   - Bactrim for PJP ppx on hold for BACILIO, will consider dapsone as alternative but deferring for now given low hgb  - Following recent bronch cultures from   - DSA, EBV, IgG, and donor risk labs ordered   - Wean midodrine as able  - Recommend EP consult given persistent SVT limiting HD runs and therapeutic UF  - Recommend gastric emptying study  - Schedule simethicone (prn order not currently being utilized)     S/p bilateral  sequential lung transplant (BSLT) for end stage COPD:  Persistent hypercapneic respiratory failure:   Persistent hypotension:   Bilateral hydroPTX:  Right hemidiaphragm palsy: Explant pathology with severe emphysema with subpleural bullae formation, changes of chronic bronchitis, subpleural scars and patchy pulmonary edema; benign hilar lymph nodes.  No evidence of PGD post-op.  Extubated 6/30.  Persistent hypercapnia without dyspnea, appears to be a respiratory drive problem.  Diaphragm assessment by US did not show dysfunction (per CVICU team) and metabolic cart study only 6 mins but not supportive of overfeeding.  Some improvement with BiPAP, limited tolerance in part due to anxiety.  Chest CT (7/7) with bilateral small hydroPTX, chest wall subcutaneous emphysema, air collection of right chest wall anteriorly associated with pleura on right, and some narrowing of left sided anastomosis.  Persistent low dose pressor requirement, weaned off 7/10, remains on scheduled midodrine (also s/p hydrocortisone 7/6-7/9 and fludrocortisone 7/6-7/11).  Bronch (7/12) given CXR changes with small amount of granulation tissue posteriorly in right anastomosis (no stenosis or dehiscence), thick brown secretions in proximal airways, and right mainstem/RUL mucous plug (removed).  CT chest (7/14) with stable biapical PTX and stable dependent bilateral pleural effusions.  SNIFF (7/14) notable for right hemidiaphragm palsy.  On 1-2L NC with BiPAP overnight (some improvement in hypercapnea).  - DSA one month post-transplant (7/28, ordered)  - Ammonia monitoring twice weekly (screening for hyperammonemia post-lung transplant)  - Nebs: levalbuterol and Mucomyst QID   - Pulmonary toilet with chest physiotherapy QID, encourage consistent participation d/t concern for ineffective airway clearance  - Aerobika and incentive spirometry hourly while awake  - Supplemental oxygen as needed to maintain SpO2 >92% (wean as able), NIPPV overnight as  tolerated given persistent hypercapnia  - Chest tubes managed by surgical team  - Daily 2-view CXR, today with interval improvement in LLL hazy opacities, unchanged pulmonary edema t/o (personally reviewed)  - Aggressive volume removal as able  - Encourage increased activity including up in chair (minimal day time in bed) and active participation in PT/OT given concern for deconditioning and low activity level (not sure if this is limited by physical and/or mental barriers)     Immunosuppression:  Induction therapy with basiliximab (and high dose IV steroid) given intraoperatively, repeating basiliximab dose on POD#4.  - Tacrolimus 5 mg BID (via NJ tube, held 7/11-7/12 for supratherapeutic level, peak level appropriate 7/11).  Goal level 8-12.  Repeat level 7/18 (ordered).  - MMF 1000 mg BID (decreased 7/10 due to diarrhea)  - Prednisone 15 mg BID, next taper due 7/21 (not yet ordered)  Date AM dose (mg) PM dose (mg)   7/14/22 15 15   7/21/22 15 12.5   7/28/22 12.5 12.5   8/4/22 12.5 10   8/18/22 10 10   9/15/22 10 7.5   10/13/22 7.5 7.5   11/10/22 7.5 5   12/8/22 5 5   1/5/23 5 2.5      Prophylaxis:   - Bactrim for PJP ppx on hold since 7/7 for BACILIO, will consider dapsone as alternative but deferring for now given low hgb (G6PD sufficient)  - VGCV for CMV ppx (started 7/7)  - Nystatin for oral candidiasis ppx, 6 month course  - See below for serologies and viral ppx:    Donor Recipient Intervention   CMV status Positive Positive Valganciclovir POD #8-90   EBV status Positive Positive EBV check monthly (7/28, ordered)   HSV status N/A Positive Not indicated      ID:  H/o M. peregrinum colonization: Donor bronch cultures (OSH) with Strep beta hemolytic (not group A).  Recipient cultures as below.  - IgG at one month (7/28, ordered)  - Bronch cultures (7/12) NGTD, follow  - AFB bronch culture (6/28, 6/29) NGTD, AFB to be sent on all future bronchs     Streptococcus pneumoniae:  Stenotrophomonas maltophilia: Noted  in recipient cultures at time of transplant.  S/p ceftazidime 6/28-7/10, vancomycin 7/7-7/8 and 6/28-6/30, and levofloxacin 7/10-7/12 for total 2 week ABX course.     H/o hepatitis C: Diagnosed in 1980s, 2 mos of treatment, quant negative since 10/2017, last positive 2/20/17 ([phone]).  Glenis positive on 6/2021 with negative HCV PCR.  H/o remote EtOH abuse.  MR elastography (4/27/21) with hepatology review and consult without any concerns post transplant.  Hepatitis C RNA negative and hepatitis C antibody positive (old) on 6/28.     PHS risk criteria donor:  Additional labs required post-transplant (between 4-8 weeks post-op): Hepatitis B, Hepatitis C, and HIV by SHERI ([id number], ordered 7/28).     Other relevant problems managed by primary team:      BACILIO:   Hyperkalemia: Likely multifactorial including medications (Bactrim, tacrolimus) and hypotension.  Fludrocortisone 7/6-7/11.  Potassium now stable.  S/p non-tunneled HD line 7/14.  Initial iHD run 7/14 limited by afib with RVR vs SVT.  - Tacrolimus monitoring as above  - Volume management per nephrology and ICU team     C diff infection: Abdominal pain with diarrhea noted 7/8, AXR without dilated bowel, moderate colonic stool burden.  C diff positive 7/11.    - PO vancomycin (7/11) per ICU team     Left hand ischemia: Radial artery thrombosis identified on duplex doppler.  S/p thrombectomy on 7/2.  Completed high intensity heparin course.  Continue daily aspirin.     SVT: First noted on 7/14, prior to HD line placement.  Continues intermittently.  - Primary team to adjust HD line  - Metoprolol per primary team (started 7/15)  - Continue to wean midodrine as able  - Recommend EP consult given persistent SVT limiting HD runs and therapeutic UF     Abdominal pain: Noted 7/15, cramping pain.  ACR with large stool burden in colon, no obstruction or distention.  Some nausea but no emesis.  CT abdomen (7/15) with moderate to large gastric distention, otherwise without  "obstruction.  NG placed for LIS with moderate to large output.  - Recommend gastric emptying study  - Schedule simethicone (prn order not currently being utilized)  - Additional management per primary     We appreciate the excellent care provided by the [place] [id number] team.  Recommendations communicated via telephone and this note.  Will continue to follow along closely, please do not hesitate to call with any questions or concerns.     Patient discussed with Dr. Miller.    Catherine Johnson, DNP, APRN, CNP  Inpatient Nurse Practitioner  Pulmonary CF/Transplant     Subjective & Interval History:     Tolerated BiPAP for 5-6 hours last night with improvement in hypercapnia this morning.  On 2L NC during the day.  Still with weak cough with minimal expectoration of congestion.  Abdominal pain improved today after NG placed for LIS based on imaging (moderate to large output).  Recurrence of SVT with HD run yesterday limiting UF to only 250 ml.    Review of Systems:     C: No fever, no chills, no recent change in weight  INTEGUMENTARY/SKIN: No rash or obvious new lesions  ENT/MOUTH: No sore throat, no sinus pain, no nasal congestion or drainage  RESP: See interval history  CV: No chest pain, no palpitations, + peripheral edema, no orthopnea  GI: + occasional nausea, no reflux symptoms  : No dysuria  MUSCULOSKELETAL: See interval history  ENDOCRINE: Blood sugars persistently elevated >200  NEURO: No headache, no numbness or tingling  PSYCHIATRIC: See interval history    Physical Exam:     All notes, images, and labs from past 24 hours (at minimum) were reviewed.    Vital signs:  Temp: 97.7  F (36.5  C) Temp src: Oral BP: 114/64 Pulse: 100   Resp: 16 SpO2: 96 % O2 Device: Oxi Plus Oxygen Delivery: 2 LPM Height: 157.5 cm (5' 2\") Weight: 71.4 kg (157 lb 6.5 oz)  I/O:     Intake/Output Summary (Last 24 hours) at 7/16/2022 0921  Last data filed at 7/16/2022 0800  Gross per 24 hour   Intake 1385 ml   Output 2567 ml   Net -1182 ml "     Constitutional: Sitting up in a chair, daughter at bedside, in no apparent distress.   HEENT: Eyes with pink conjunctivae, anicteric.  Oral mucosa moist without lesions.  NG tube in left nare, NJ tube in right.  PULM: Mildly diminished air flow bilaterally.  No crackles, no rhonchi, no wheezes.  Non-labored breathing on 2L NC.  CV: Normal S1 and S2.  Transient SVT, resolved with vagal maneuver.  No murmur, gallop, + pericardial rub.  2-3+ BLE edema.   ABD: NABS, soft, nontender, nondistended.    MSK: Moves all extremities.  + muscle wasting.   NEURO: Alert, more conversant.   SKIN: Warm, dry, fragile, scattered ecchymosis.  PSYCH: Mood stable    Lines, Drains, and Devices:  Peripheral IV 07/14/22 Distal;Right;Posterior Lower forearm (Active)   Site Assessment [facility] 07/16/22 0400   Line Status Saline locked 07/16/22 0400   Phlebitis Scale 0-->no symptoms 07/16/22 0400   Infiltration Scale 0 07/16/22 0400   Infiltration Site Treatment Method  None 07/16/22 0400   Number of days: 2       Peripheral IV 07/14/22 Anterior;Right Lower forearm (Active)   Site Assessment [facility] 07/16/22 0400   Line Status Saline locked 07/16/22 0400   Phlebitis Scale 0-->no symptoms 07/16/22 0400   Infiltration Scale 0 07/16/22 0400   Infiltration Site Treatment Method  None 07/16/22 0400   Number of days: 2       CVC Double Lumen Right Internal jugular Non - tunneled (Active)   Site Assessment [facility] 07/16/22 0400   Dressing Type Chlorhexidine disk;Transparent 07/16/22 0400   Dressing Status clean;dry;intact 07/16/22 0400   Dressing Change Due 07/17/22 07/16/22 0400   Tubing Change primary tubing 07/15/22 1654   Line Necessity yes, meets criteria 07/16/22 0400   Blue - Status saline locked 07/16/22 0400   Blue - Cap Change Due 07/22/22 07/15/22 1654   Red - Status saline locked 07/16/22 0400   Red - Cap Change Due 07/22/22 07/15/22 1654   Phlebitis Scale 0-->no symptoms 07/14/22 1200   Infiltration? no 07/16/22 0400   Infiltration Scale 0  07/16/22 0400   Infiltration Site Treatment Method  None 07/16/22 0400   Was a vesicant infusing? no 07/16/22 0400   CVC Comment HD LINE 07/15/22 2000   Number of days: 2     Data:     LABS    CMP:   Recent Labs   Lab 07/16/22  0843 07/16/22  0531 07/16/22  0321 07/15/22  2355 07/15/22  0809 07/15/22  0420 07/14/22  2356 07/14/22  2234 07/14/22 2023 07/14/22  1701 07/14/22  0752 07/14/22  0459 07/13/22  0434 07/13/22  0428 07/11/22  0805 07/11/22 0438   NA  --  133*  --   --   --  135*  --  134*  --  141  --  140   < > 139   < > 139   POTASSIUM  --  4.8  --   --   --  4.6  --  4.4  --  5.1  --  4.3   < > 4.5   < > 4.4   CHLORIDE  --  97*  --   --   --  93*  --  92*  --  97*  --  92*   < > 89*   < > 93*   CO2  --  31*  --   --   --  34*  --  36*  --  24  --  42*   < > 42*   < > 38*   ANIONGAP  --  5*  --   --   --  8  --  6*  --  20*  --  6*   < > 8   < > 8   * 144* 152* 146*   < > 245*   < > 207*   < > 215*   < > 200*  193*   < > 239*   < > 233*   BUN  --  66.2*  --   --   --  123.0*  --  119.0*  --  110.0*  --  155.0*   < > 150.0*   < > 126.0*   CR  --  1.96*  --   --   --  2.65*  --  2.66*  --  2.35*  --  3.00*   < > 3.23*   < > 2.78*   GFRESTIMATED  --  29*  --   --   --  20*  --  20*  --  23*  --  17*   < > 16*   < > 19*   ESTUARDO  --  8.7*  --   --   --  8.7*  --  8.5*  --  8.8  --  8.6*   < > 8.6*   < > 8.4*   MAG  --  2.3  --   --   --  2.7*  --  2.5*  --  2.8*  --  3.1*  --  2.8*   < > 2.7*   PHOS  --  4.6*  --   --   --  5.2*  --   --   --   --   --  5.1*  --  5.0*   < > 5.4*   PROTTOTAL  --   --   --   --   --  4.9*  --   --   --   --   --  4.6*  --   --   --  4.6*   ALBUMIN  --   --   --   --   --  2.9*  --   --   --   --   --  2.6*  --   --   --  2.7*   BILITOTAL  --   --   --   --   --  0.2  --   --   --   --   --  0.2  --   --   --  <0.2   ALKPHOS  --   --   --   --   --  74  --   --   --   --   --  64  --   --   --  78   AST  --   --   --   --   --  31  --   --   --   --   --  22  --   --   --   16   ALT  --   --   --   --   --  32  --   --   --   --   --  22  --   --   --  22    < > = values in this interval not displayed.     CBC:   Recent Labs   Lab 07/16/22  0531 07/15/22  0422 07/14/22  1901 07/14/22  0459   WBC 10.1 9.8 10.3 10.5   RBC 2.83* 2.72* 3.04* 2.19*   HGB 8.7* 8.3* 9.4* 6.8*   HCT 28.2* 27.6* 30.6* 22.4*    102* 101* 102*   MCH 30.7 30.5 30.9 31.1   MCHC 30.9* 30.1* 30.7* 30.4*   RDW 15.9* 16.5* 17.0* 16.4*    262 295 274       INR: No lab results found in last 7 days.    Glucose:   Recent Labs   Lab 07/16/22  0843 07/16/22  0531 07/16/22  0321 07/15/22  2355 07/15/22  1954 07/15/22  1551   * 144* 152* 146* 195* 184*       Blood Gas:   Recent Labs   Lab 07/16/22  0531 07/15/22  0420 07/14/22  1701   PHV 7.29* 7.32 7.49*   PCO2V 73* 79* 46   PO2V 37 52* 119*   HCO3V 35* 41* 35*   LINDY 6.5* 11.9* 10.7*   O2PER 30 30 20       Culture Data No results for input(s): CULT in the last 168 hours.    Virology Data:   Lab Results   Component Value Date    FLUAH1 Not Detected 06/29/2022    FLUAH3 Not Detected 06/29/2022    NG0418 Not Detected 06/29/2022    IFLUB Not Detected 06/29/2022    RSVA Not Detected 06/29/2022    RSVB Not Detected 06/29/2022    PIV1 Not Detected 06/29/2022    PIV2 Not Detected 06/29/2022    PIV3 Not Detected 06/29/2022    HMPV Not Detected 06/29/2022       Historical CMV results (last 3 of prior testing):  No results found for: CMVQNT  No results found for: CMVLOG    Urine Studies    Recent Labs   Lab Test 07/08/22  0831 07/05/22  1004   URINEPH 5.5 5.5   NITRITE Negative Negative   LEUKEST Negative Negative   WBCU 1 5       Most Recent [place] Pulmonary Function Testing (FVC/FEV1 only)  FVC-Pre   Date Value Ref Range Status   04/29/2022 1.82 L    11/11/2021 2.17 L    06/14/2021 2.00 L      FVC-%Pred-Pre   Date Value Ref Range Status   04/29/2022 58 %    11/11/2021 70 %    06/14/2021 64 %      FEV1-Pre   Date Value Ref Range Status   04/29/2022 0.51 L     11/11/2021 0.53 L    06/14/2021 0.54 L      FEV1-%Pred-Pre   Date Value Ref Range Status   04/29/2022 20 %    11/11/2021 21 %    06/14/2021 21 %        IMAGING    Recent Results (from the past 48 hour(s))   CT Chest w/o Contrast    Narrative    Chest CT without contrast 7/14/2022 9:57 AM    Clinical information: 62 years Female Interval f/u on prior PTX and  loculated effusions, persistent hypercapnia    Comparison: Same day chest radiograph. Chest CT without contrast  7/7/2022.    FINDINGS:    LINES/TUBES: Bilateral lung transplant post surgical changes.  Bibasilar chest tubes in place. Partially visualized postpyloric  feeding tube. Right IJ sheath terminates in the high SVC. Pericardial  drain.    LUNG: Persistent 2.5 cm solid ovoid masslike lesion in the posterior  left upper lobe (series 4 image 65). No groundglass opacities  bilaterally. Scattered sub-3 mm pulmonary nodules bilaterally.    LARGE AIRWAYS: Patent tracheobronchial tree without intraluminal mass.    PLEURA: Unchanged bilateral small effusions. Unchanged biapical  pneumothoraces and resolved left basilar pneumothorax.    VESSELS: Normal configuration of the great vessels. Main pulmonary  artery measures 3.7 cm in diameter. Mild aortic calcifications.    HEART: Cardiomegaly. No pericardial effusion. Extensive coronary  artery atherosclerotic calcifications.    MEDIASTINUM AND JENNIFER: No suspicious lymphadenopathy.    CHEST WALL: Intact shell sternotomy wires. Retrosternal air. Anterior  chest wall subcutaneous emphysema, resolved on the right and  persistent on the left.    LOWER NECK: Thyroid unremarkable.    UPPER ABDOMEN: Limited evaluation due to lack of contrast. Esophagus  appears unremarkable. Small amount of perihepatic ascites. Hyperdense  material within the stomach.    BONES: Mild thoracic spine degenerative changes. No acute osseous  abnormality. No suspicious bony lesions. Unremarkable soft tissues.      Impression    IMPRESSION:   1.   Unchanged biapical pneumothoraces, resolved left basilar  pneumothorax. Unchanged bilateral effusions.  2.  Pulmonary arterial hypertension suggested by main pulmonary artery  dilation.  3.  Small amount of perihepatic ascites.  4.  Support devices as above.    I have personally reviewed the examination and initial interpretation  and I agree with the findings.    JOHANN MORAES MD         SYSTEM ID:  [id number]   XR Chest Port 1 View    Narrative    EXAM: XR CHEST PORT 1 VIEW  7/14/2022 12:58 PM    HISTORY: 62 years Female HD line placement.     COMPARISON: Chest radiograph 7/14/2022 5:44 AM. Same-day chest CT  without contrast.    TECHNIQUE: Portable AP view of the chest. Frontal and lateral views of  the chest.    FINDINGS:   Stable chest tube positioning. Partially visualized feeding tube.  Right IJ central venous catheter is at the mid SVC. Intact clamshell  sternotomy wires. Surgical clips about the chest. Aortic arch  calcification.    Slightly widened mediastinum, similar to prior. Midline trachea.  Stable cardiac silhouette. Indistinct pulmonary vasculature.  Persistent left costophrenic haziness. Tiny left pneumothorax. Stable  lung volumes. Decreased right basilar hazy opacities and persistent  dense retrocardiac opacification. Unremarkable upper abdomen. No acute  or suspicious osseous abnormalities. Unremarkable soft tissues.      Impression    IMPRESSION:   1.  New right IJ CVC tip is at the mid SVC.  2.  Tiny left pneumothorax .  3.  Stable left pleural effusion. Decreased small right pleural  effusion.  4.  Persistent retrocardiac opacification likely representing  pulmonary edema and/or atelectasis.    I have personally reviewed the examination and initial interpretation  and I agree with the findings.    JOHANN MORAES MD         SYSTEM ID:  [id number]   XR Chest/Heart Fluoro    Narrative    EXAM:  XR CHEST/HEART FLUORO 7/14/2022 2:59 PM    INDICATION: S/p lung transplant, evaluate diaphragm  function    COMPARISON:  Same-day chest radiograph    FLUOROSCOPY TIME: 0.6 minutes    FINDINGS:  During normal breathing, there is lack of of movement of the right  hemidiaphragm compared to the left. During sniff test there is  paradoxical superior excursion of the right hemidiaphragm. There is  normal excursion of the left hemidiaphragm.    The remaining visualized lung bases and abdomen are fluoroscopically  unremarkable. Partially visualized enteric tube and right central  venous catheter projecting over the low SVC. Intact clamshell  sternotomy wires. Bilateral pleural chest tubes in place.      Impression    IMPRESSION:  Findings consistent with right hemidiaphragm palsy.    I have personally reviewed the examination and initial interpretation  and I agree with the findings.    YANNICK BENAVIDEZ MD         SYSTEM ID:  [id number]   XR Chest Port 1 View    Narrative    EXAM: XR CHEST PORT 1 VIEW  7/14/2022 6:17 PM     HISTORY:  CVC location       COMPARISON:  7/14/2022    FINDINGS  Technique: Semiupright AP view of the chest.     Devices: Right internal jugular catheter terminates over the low SVC.  Clamshell sternotomy wires. Bibasilar chest tubes. Mediastinal drain.  Feeding tube passes inferior to the field of view.    Bibasilar perihilar streaky opacities and left retrocardiac opacity.  Tiny left pneumothorax unchanged. Stable left pleural effusion. Trace  right pleural effusion.    Cardiac silhouette stable in size.       Impression    IMPRESSION:     1. Right central venous catheter terminates over the low SVC.  Additional support devices stable.  2. Stable small/moderate left pleural effusion and trace right pleural  effusion.  3. Stable tiny left pneumothorax.  4. Stable left basilar atelectasis and bibasilar/perihilar atelectasis  versus edema.    I have personally reviewed the examination and initial interpretation  and I agree with the findings.    JOHANN MORAES MD         SYSTEM ID:  [id number]   XR  Chest Port 1 View    Narrative    Exam: XR CHEST PORT 1 VIEW, 7/15/2022 6:08 AM    Indication: chest tubes and R hydropneumothorax s/p bilateral lung  transplant    Comparison: 7/14/2022    Findings:   Bibasilar chest tubes are in place. Mediastinal drain is in place.  Right IJ central line tip in the mid superior vena cava. Feeding tube  is seen coursing through the mediastinum tip projects off the film.    No interval change in the apparent loculated pleural effusion at the  left lung base. Small amount of fluid is seen in the fissure on the  right. Biapical pleural fluid is seen. Small left apical pneumothorax.  Heart is upper limits of normal in size. Pulmonary vasculature. By  predominantly interstitial opacities throughout both lungs      Impression    Impression:   1. Stable support devices.  2. Stable loculated left basilar pleural effusion.  3. Stable left apical hydropneumothorax.  4. Interstitial process throughout both lungs suggests edema.    GABRIELLA SNELL MD         SYSTEM ID:  [id number]   XR Abdomen Port 2 Views    Narrative    EXAMINATION:  XR ABDOMEN PORT 2 VIEWS 7/15/2022 6:13 AM     COMPARISON: Radiograph 7/12/2022.    HISTORY: 62F in ICU s/p bilateral lung transplant and unexplained  hypercarbia - developing acute abd pain/distention, concern for  fulminant c.diff    TECHNIQUE: Frontal upright and supine views of the abdomen.    FINDINGS: Portable supine and upright views of the abdomen. Enteric  tube tip projects over the second portion of the duodenum. Partially  imaged thoracic support devices, better evaluated on same-day chest  radiograph. Nonobstructive bowel gas pattern. No pneumatosis, portal  venous gas, or free air. Distended stomach. Left pleural effusion and  overlying consolidative opacity.       Impression    IMPRESSION:   Nonobstructive bowel gas pattern with distended stomach, similar to  prior. No free air.    I have personally reviewed the examination and initial  interpretation  and I agree with the findings.    GABRIELLA SNELL MD         SYSTEM ID:  [id number]   CT Abdomen Pelvis w/o Contrast    Narrative    EXAMINATION: CT ABDOMEN PELVIS W/O CONTRAST, 7/15/2022 11:31 AM    INDICATION: S/P bilateral lung transplant and C dif now with  increasing abdominal pain and concern for toxic megacolon    COMPARISON STUDY: CT AP 7/14/2022    TECHNIQUE: CT scan of the abdomen and pelvis was performed on  multidetector CT scanner using volumetric acquisition technique and  images were reconstructed in multiple planes with variable thickness  and reviewed on dedicated workstations.     CT scan radiation dose is optimized to minimum requisite dose using  automated dose modulation techniques.    FINDINGS:    Lower thorax: Bibasilar chest tube in place. Partially visualized  central venous catheter tip terminates in the lower SVC. Intact  clamshell sternotomy wires. Normal heart size. Moderate coronary  artery calcifications. Fluid in the pericardial recess.     Postoperative changes of bilateral lung transplant. Subtle groundglass  opacities in the right lower lobe. Right greater than left pleural  effusions, at least partially loculated. Small foci of gas within the  right pleural effusion. Trace pneumothorax along the middle lobe.  Small amount of pneumomediastinum, likely postoperative.    Liver: No mass. No intrahepatic biliary ductal dilation.    Biliary System: Moderately distended gallbladder. No cholelithiasis or  secondary evidence of cholecystitis. No extrahepatic biliary ductal  dilation.    Pancreas: No mass or pancreatic ductal dilation.    Adrenal glands: No mass or nodules    Spleen: Normal.    Kidneys: Bilateral nonobstructing calyceal stones, measuring up to the  3 mm in the left lower lobe. No suspicious mass, obstructing calculus  or hydronephrosis.    Gastrointestinal tract : Enteric tube terminates in the third portion  of the duodenum. Stomach is distended. Rectal tube  in place. Contrast  within the small bowel. Normal caliber small and large bowel.    Mesentery/peritoneum/retroperitoneum: Small volume ascites.    Lymph nodes: No significant lymphadenopathy.    Vasculature: Patent major abdominal vasculature.    Pelvis: Gas within the urinary bladder, likely due to recent  catheterization.   Dong catheter in place.      Osseous structures: No aggressive or acute osseous lesion.      Soft tissues: Within normal limits.      Impression    IMPRESSION:   1. Moderately distended stomach. Enteric tube in place terminating in  the duodenum. No evidence of obstruction or inflammatory changes in  the bowel.   2. Small right hydropneumothorax with trace amount of air along the  major fissure. Small left partially loculated pleural effusion.  Bilateral basilar chest tubes in place  3. Postoperative changes of bilateral lung transplant with small of  pneumomediastinum, likely post operative.  4. Moderately distended gallbladder without cholelithiasis or  associated inflammatory changes to suggest acute cholecystitis.  5. Small volume ascites.   XR Abdomen Port 1 View    Narrative    Abdomen one view portable 7/15/2022 at 1754 hours    INDICATION: Check NG tube placement    COMPARISON: CT earlier today. Plain film earlier today 0605 hours    FINDINGS: Feeding tube tip is in the distal duodenum. NG tube tip and  sidehole projected in the stomach. Nonobstructive bowel gas pattern.  Clamshell sternotomy wires partially imaged. Left basilar thoracostomy  tube.      Impression    IMPRESSION: Feeding tube and NG tube as described.    ALVINA HARRY MD         SYSTEM ID:  [id number]

## 2022-07-17 ENCOUNTER — APPOINTMENT (OUTPATIENT)
Dept: GENERAL RADIOLOGY | Facility: CLINIC | Age: 62
DRG: 007 | End: 2022-07-17
Attending: NURSE PRACTITIONER
Payer: MEDICARE

## 2022-07-17 ENCOUNTER — APPOINTMENT (OUTPATIENT)
Dept: OCCUPATIONAL THERAPY | Facility: CLINIC | Age: 62
DRG: 007 | End: 2022-07-17
Attending: THORACIC SURGERY (CARDIOTHORACIC VASCULAR SURGERY)
Payer: MEDICARE

## 2022-07-17 LAB
ANION GAP SERPL CALCULATED.3IONS-SCNC: 10 MMOL/L (ref 7–15)
BASE EXCESS BLDV CALC-SCNC: 6.2 MMOL/L (ref -7.7–1.9)
BASOPHILS # BLD MANUAL: 0 10E3/UL (ref 0–0.2)
BASOPHILS NFR BLD MANUAL: 0 %
BUN SERPL-MCNC: 41.1 MG/DL (ref 8–23)
CALCIUM SERPL-MCNC: 8.4 MG/DL (ref 8.8–10.2)
CHLORIDE SERPL-SCNC: 90 MMOL/L (ref 98–107)
CREAT SERPL-MCNC: 1.62 MG/DL (ref 0.51–0.95)
DEPRECATED HCO3 PLAS-SCNC: 27 MMOL/L (ref 22–29)
EOSINOPHIL # BLD MANUAL: 0 10E3/UL (ref 0–0.7)
EOSINOPHIL NFR BLD MANUAL: 0 %
ERYTHROCYTE [DISTWIDTH] IN BLOOD BY AUTOMATED COUNT: 15.6 % (ref 10–15)
GFR SERPL CREATININE-BSD FRML MDRD: 36 ML/MIN/1.73M2
GLUCOSE BLDC GLUCOMTR-MCNC: 161 MG/DL (ref 70–99)
GLUCOSE BLDC GLUCOMTR-MCNC: 162 MG/DL (ref 70–99)
GLUCOSE BLDC GLUCOMTR-MCNC: 164 MG/DL (ref 70–99)
GLUCOSE BLDC GLUCOMTR-MCNC: 164 MG/DL (ref 70–99)
GLUCOSE BLDC GLUCOMTR-MCNC: 193 MG/DL (ref 70–99)
GLUCOSE BLDC GLUCOMTR-MCNC: 223 MG/DL (ref 70–99)
GLUCOSE SERPL-MCNC: 148 MG/DL (ref 70–99)
HCO3 BLDV-SCNC: 33 MMOL/L (ref 21–28)
HCT VFR BLD AUTO: 27.3 % (ref 35–47)
HGB BLD-MCNC: 8.6 G/DL (ref 11.7–15.7)
LYMPHOCYTES # BLD MANUAL: 0.4 10E3/UL (ref 0.8–5.3)
LYMPHOCYTES NFR BLD MANUAL: 4 %
MAGNESIUM SERPL-MCNC: 1.9 MG/DL (ref 1.7–2.3)
MCH RBC QN AUTO: 30.9 PG (ref 26.5–33)
MCHC RBC AUTO-ENTMCNC: 31.5 G/DL (ref 31.5–36.5)
MCV RBC AUTO: 98 FL (ref 78–100)
MONOCYTES # BLD MANUAL: 0.6 10E3/UL (ref 0–1.3)
MONOCYTES NFR BLD MANUAL: 6 %
NEUTROPHILS # BLD MANUAL: 9.2 10E3/UL (ref 1.6–8.3)
NEUTROPHILS NFR BLD MANUAL: 90 %
O2/TOTAL GAS SETTING VFR VENT: 13 %
PCO2 BLDV: 60 MM HG (ref 40–50)
PH BLDV: 7.35 [PH] (ref 7.32–7.43)
PHOSPHATE SERPL-MCNC: 4.1 MG/DL (ref 2.5–4.5)
PLAT MORPH BLD: ABNORMAL
PLATELET # BLD AUTO: 213 10E3/UL (ref 150–450)
PO2 BLDV: 37 MM HG (ref 25–47)
POTASSIUM SERPL-SCNC: 3.5 MMOL/L (ref 3.4–5.3)
RBC # BLD AUTO: 2.78 10E6/UL (ref 3.8–5.2)
RBC MORPH BLD: ABNORMAL
SODIUM SERPL-SCNC: 127 MMOL/L (ref 136–145)
WBC # BLD AUTO: 10.2 10E3/UL (ref 4–11)

## 2022-07-17 PROCEDURE — 99233 SBSQ HOSP IP/OBS HIGH 50: CPT | Performed by: STUDENT IN AN ORGANIZED HEALTH CARE EDUCATION/TRAINING PROGRAM

## 2022-07-17 PROCEDURE — 250N000013 HC RX MED GY IP 250 OP 250 PS 637: Performed by: STUDENT IN AN ORGANIZED HEALTH CARE EDUCATION/TRAINING PROGRAM

## 2022-07-17 PROCEDURE — 71045 X-RAY EXAM CHEST 1 VIEW: CPT | Mod: 26 | Performed by: RADIOLOGY

## 2022-07-17 PROCEDURE — 250N000011 HC RX IP 250 OP 636: Performed by: INTERNAL MEDICINE

## 2022-07-17 PROCEDURE — 999N000157 HC STATISTIC RCP TIME EA 10 MIN

## 2022-07-17 PROCEDURE — 85027 COMPLETE CBC AUTOMATED: CPT | Performed by: NURSE PRACTITIONER

## 2022-07-17 PROCEDURE — 250N000013 HC RX MED GY IP 250 OP 250 PS 637: Performed by: THORACIC SURGERY (CARDIOTHORACIC VASCULAR SURGERY)

## 2022-07-17 PROCEDURE — 97535 SELF CARE MNGMENT TRAINING: CPT | Mod: GO | Performed by: OCCUPATIONAL THERAPIST

## 2022-07-17 PROCEDURE — 250N000012 HC RX MED GY IP 250 OP 636 PS 637: Performed by: PHYSICIAN ASSISTANT

## 2022-07-17 PROCEDURE — 250N000013 HC RX MED GY IP 250 OP 250 PS 637: Performed by: SURGERY

## 2022-07-17 PROCEDURE — 93005 ELECTROCARDIOGRAM TRACING: CPT

## 2022-07-17 PROCEDURE — 250N000011 HC RX IP 250 OP 636: Performed by: SURGERY

## 2022-07-17 PROCEDURE — 250N000013 HC RX MED GY IP 250 OP 250 PS 637

## 2022-07-17 PROCEDURE — 93010 ELECTROCARDIOGRAM REPORT: CPT | Performed by: INTERNAL MEDICINE

## 2022-07-17 PROCEDURE — 250N000009 HC RX 250: Performed by: SURGERY

## 2022-07-17 PROCEDURE — 71045 X-RAY EXAM CHEST 1 VIEW: CPT

## 2022-07-17 PROCEDURE — 999N000127 HC STATISTIC PERIPHERAL IV START W US GUIDANCE

## 2022-07-17 PROCEDURE — 99233 SBSQ HOSP IP/OBS HIGH 50: CPT | Mod: 24 | Performed by: NURSE PRACTITIONER

## 2022-07-17 PROCEDURE — 82803 BLOOD GASES ANY COMBINATION: CPT | Performed by: NURSE PRACTITIONER

## 2022-07-17 PROCEDURE — 97129 THER IVNTJ 1ST 15 MIN: CPT | Mod: GO | Performed by: OCCUPATIONAL THERAPIST

## 2022-07-17 PROCEDURE — 99233 SBSQ HOSP IP/OBS HIGH 50: CPT | Mod: 24 | Performed by: INTERNAL MEDICINE

## 2022-07-17 PROCEDURE — 94640 AIRWAY INHALATION TREATMENT: CPT | Mod: 76

## 2022-07-17 PROCEDURE — 84100 ASSAY OF PHOSPHORUS: CPT | Performed by: SURGERY

## 2022-07-17 PROCEDURE — 120N000002 HC R&B MED SURG/OB UMMC

## 2022-07-17 PROCEDURE — 36415 COLL VENOUS BLD VENIPUNCTURE: CPT | Performed by: NURSE PRACTITIONER

## 2022-07-17 PROCEDURE — 94640 AIRWAY INHALATION TREATMENT: CPT

## 2022-07-17 PROCEDURE — 94668 MNPJ CHEST WALL SBSQ: CPT

## 2022-07-17 PROCEDURE — 97530 THERAPEUTIC ACTIVITIES: CPT | Mod: GO | Performed by: OCCUPATIONAL THERAPIST

## 2022-07-17 PROCEDURE — 258N000003 HC RX IP 258 OP 636: Performed by: STUDENT IN AN ORGANIZED HEALTH CARE EDUCATION/TRAINING PROGRAM

## 2022-07-17 PROCEDURE — 250N000009 HC RX 250: Performed by: STUDENT IN AN ORGANIZED HEALTH CARE EDUCATION/TRAINING PROGRAM

## 2022-07-17 PROCEDURE — 250N000012 HC RX MED GY IP 250 OP 636 PS 637: Performed by: THORACIC SURGERY (CARDIOTHORACIC VASCULAR SURGERY)

## 2022-07-17 PROCEDURE — 85007 BL SMEAR W/DIFF WBC COUNT: CPT | Performed by: NURSE PRACTITIONER

## 2022-07-17 PROCEDURE — 83735 ASSAY OF MAGNESIUM: CPT | Performed by: SURGERY

## 2022-07-17 PROCEDURE — 94660 CPAP INITIATION&MGMT: CPT

## 2022-07-17 PROCEDURE — 80048 BASIC METABOLIC PNL TOTAL CA: CPT | Performed by: NURSE PRACTITIONER

## 2022-07-17 PROCEDURE — 250N000013 HC RX MED GY IP 250 OP 250 PS 637: Performed by: NURSE PRACTITIONER

## 2022-07-17 PROCEDURE — 250N000011 HC RX IP 250 OP 636

## 2022-07-17 PROCEDURE — 99221 1ST HOSP IP/OBS SF/LOW 40: CPT | Mod: GC | Performed by: INTERNAL MEDICINE

## 2022-07-17 PROCEDURE — 250N000013 HC RX MED GY IP 250 OP 250 PS 637: Performed by: INTERNAL MEDICINE

## 2022-07-17 RX ORDER — METOPROLOL TARTRATE 25 MG/1
25 TABLET, FILM COATED ORAL 2 TIMES DAILY
Status: DISCONTINUED | OUTPATIENT
Start: 2022-07-17 | End: 2022-07-28

## 2022-07-17 RX ORDER — ONDANSETRON 4 MG/1
4 TABLET, ORALLY DISINTEGRATING ORAL DAILY
Status: DISCONTINUED | OUTPATIENT
Start: 2022-07-18 | End: 2022-08-12

## 2022-07-17 RX ORDER — POTASSIUM CHLORIDE 1.5 G/1.58G
20 POWDER, FOR SOLUTION ORAL ONCE
Status: COMPLETED | OUTPATIENT
Start: 2022-07-17 | End: 2022-07-17

## 2022-07-17 RX ORDER — MAGNESIUM SULFATE HEPTAHYDRATE 40 MG/ML
2 INJECTION, SOLUTION INTRAVENOUS ONCE
Status: COMPLETED | OUTPATIENT
Start: 2022-07-17 | End: 2022-07-17

## 2022-07-17 RX ADMIN — ACETYLCYSTEINE 2 ML: 200 SOLUTION ORAL; RESPIRATORY (INHALATION) at 20:05

## 2022-07-17 RX ADMIN — SIMETHICONE 80 MG: 80 TABLET, CHEWABLE ORAL at 12:06

## 2022-07-17 RX ADMIN — ACETAMINOPHEN 975 MG: 325 TABLET, FILM COATED ORAL at 20:32

## 2022-07-17 RX ADMIN — MYCOPHENOLATE MOFETIL 1000 MG: 200 POWDER, FOR SUSPENSION ORAL at 09:32

## 2022-07-17 RX ADMIN — ACETYLCYSTEINE 2 ML: 200 SOLUTION ORAL; RESPIRATORY (INHALATION) at 08:23

## 2022-07-17 RX ADMIN — Medication 12.5 MG: at 08:47

## 2022-07-17 RX ADMIN — ASPIRIN 81 MG CHEWABLE TABLET 81 MG: 81 TABLET CHEWABLE at 08:47

## 2022-07-17 RX ADMIN — ACETYLCYSTEINE 2 ML: 200 SOLUTION ORAL; RESPIRATORY (INHALATION) at 11:55

## 2022-07-17 RX ADMIN — VANCOMYCIN HYDROCHLORIDE 125 MG: KIT at 23:30

## 2022-07-17 RX ADMIN — ACETAMINOPHEN 975 MG: 325 TABLET, FILM COATED ORAL at 08:47

## 2022-07-17 RX ADMIN — HYDROXYZINE HYDROCHLORIDE 50 MG: 25 TABLET ORAL at 20:29

## 2022-07-17 RX ADMIN — Medication 5 MG: at 17:08

## 2022-07-17 RX ADMIN — SIMETHICONE 80 MG: 80 TABLET, CHEWABLE ORAL at 08:47

## 2022-07-17 RX ADMIN — LEVALBUTEROL HYDROCHLORIDE 1.25 MG: 1.25 SOLUTION RESPIRATORY (INHALATION) at 11:55

## 2022-07-17 RX ADMIN — INSULIN ASPART 1 UNITS: 100 INJECTION, SOLUTION INTRAVENOUS; SUBCUTANEOUS at 12:08

## 2022-07-17 RX ADMIN — ONDANSETRON 4 MG: 2 INJECTION INTRAMUSCULAR; INTRAVENOUS at 10:00

## 2022-07-17 RX ADMIN — Medication 1 PACKET: at 09:41

## 2022-07-17 RX ADMIN — TACROLIMUS 5 MG: 5 CAPSULE ORAL at 18:15

## 2022-07-17 RX ADMIN — SIMETHICONE 80 MG: 80 TABLET, CHEWABLE ORAL at 15:54

## 2022-07-17 RX ADMIN — METOPROLOL TARTRATE 2.5 MG: 1 INJECTION, SOLUTION INTRAVENOUS at 09:09

## 2022-07-17 RX ADMIN — Medication 1 CAPSULE: at 08:47

## 2022-07-17 RX ADMIN — PREDNISONE 15 MG: 5 TABLET ORAL at 08:47

## 2022-07-17 RX ADMIN — Medication 12.5 MG: at 10:59

## 2022-07-17 RX ADMIN — HEPARIN SODIUM 5000 UNITS: 5000 INJECTION, SOLUTION INTRAVENOUS; SUBCUTANEOUS at 05:59

## 2022-07-17 RX ADMIN — VANCOMYCIN HYDROCHLORIDE 125 MG: KIT at 18:15

## 2022-07-17 RX ADMIN — SIMETHICONE 80 MG: 80 TABLET, CHEWABLE ORAL at 20:32

## 2022-07-17 RX ADMIN — Medication 5 MG: at 13:17

## 2022-07-17 RX ADMIN — INSULIN ASPART 1 UNITS: 100 INJECTION, SOLUTION INTRAVENOUS; SUBCUTANEOUS at 03:45

## 2022-07-17 RX ADMIN — LEVALBUTEROL HYDROCHLORIDE 1.25 MG: 1.25 SOLUTION RESPIRATORY (INHALATION) at 20:05

## 2022-07-17 RX ADMIN — TACROLIMUS 5 MG: 5 CAPSULE ORAL at 09:32

## 2022-07-17 RX ADMIN — NYSTATIN 1000000 UNITS: 100000 SUSPENSION ORAL at 20:32

## 2022-07-17 RX ADMIN — MYCOPHENOLATE MOFETIL 1000 MG: 200 POWDER, FOR SUSPENSION ORAL at 20:33

## 2022-07-17 RX ADMIN — CALCIUM CARBONATE 600 MG (1,500 MG)-VITAMIN D3 400 UNIT TABLET 1 TABLET: at 18:15

## 2022-07-17 RX ADMIN — NYSTATIN: 100000 CREAM TOPICAL at 21:19

## 2022-07-17 RX ADMIN — THIAMINE HCL TAB 100 MG 100 MG: 100 TAB at 08:47

## 2022-07-17 RX ADMIN — INSULIN ASPART 3 UNITS: 100 INJECTION, SOLUTION INTRAVENOUS; SUBCUTANEOUS at 21:19

## 2022-07-17 RX ADMIN — ACETYLCYSTEINE 2 ML: 200 SOLUTION ORAL; RESPIRATORY (INHALATION) at 16:59

## 2022-07-17 RX ADMIN — Medication 40 MG: at 20:33

## 2022-07-17 RX ADMIN — NYSTATIN 1000000 UNITS: 100000 SUSPENSION ORAL at 15:54

## 2022-07-17 RX ADMIN — MIDODRINE HYDROCHLORIDE 5 MG: 5 TABLET ORAL at 15:54

## 2022-07-17 RX ADMIN — INSULIN GLARGINE 15 UNITS: 100 INJECTION, SOLUTION SUBCUTANEOUS at 09:35

## 2022-07-17 RX ADMIN — POTASSIUM CHLORIDE 20 MEQ: 1.5 POWDER, FOR SOLUTION ORAL at 09:41

## 2022-07-17 RX ADMIN — NYSTATIN 1000000 UNITS: 100000 SUSPENSION ORAL at 08:47

## 2022-07-17 RX ADMIN — VANCOMYCIN HYDROCHLORIDE 125 MG: KIT at 05:59

## 2022-07-17 RX ADMIN — LEVALBUTEROL HYDROCHLORIDE 1.25 MG: 1.25 SOLUTION RESPIRATORY (INHALATION) at 16:59

## 2022-07-17 RX ADMIN — Medication 40 MG: at 09:41

## 2022-07-17 RX ADMIN — HEPARIN SODIUM 5000 UNITS: 5000 INJECTION, SOLUTION INTRAVENOUS; SUBCUTANEOUS at 14:10

## 2022-07-17 RX ADMIN — INSULIN ASPART 1 UNITS: 100 INJECTION, SOLUTION INTRAVENOUS; SUBCUTANEOUS at 09:34

## 2022-07-17 RX ADMIN — Medication 1 CAPSULE: at 20:32

## 2022-07-17 RX ADMIN — ACETAMINOPHEN 650 MG: 325 TABLET, FILM COATED ORAL at 23:30

## 2022-07-17 RX ADMIN — METOPROLOL TARTRATE 25 MG: 25 TABLET, FILM COATED ORAL at 20:33

## 2022-07-17 RX ADMIN — PREDNISONE 15 MG: 5 TABLET ORAL at 20:32

## 2022-07-17 RX ADMIN — INSULIN ASPART 1 UNITS: 100 INJECTION, SOLUTION INTRAVENOUS; SUBCUTANEOUS at 23:37

## 2022-07-17 RX ADMIN — ACETAMINOPHEN 975 MG: 325 TABLET, FILM COATED ORAL at 14:10

## 2022-07-17 RX ADMIN — HEPARIN SODIUM 5000 UNITS: 5000 INJECTION, SOLUTION INTRAVENOUS; SUBCUTANEOUS at 21:28

## 2022-07-17 RX ADMIN — HYDROXYZINE HYDROCHLORIDE 50 MG: 25 TABLET ORAL at 14:10

## 2022-07-17 RX ADMIN — VANCOMYCIN HYDROCHLORIDE 125 MG: KIT at 12:06

## 2022-07-17 RX ADMIN — INSULIN ASPART 4 UNITS: 100 INJECTION, SOLUTION INTRAVENOUS; SUBCUTANEOUS at 16:01

## 2022-07-17 RX ADMIN — Medication 5 MG: at 20:29

## 2022-07-17 RX ADMIN — METOPROLOL TARTRATE 2.5 MG: 1 INJECTION, SOLUTION INTRAVENOUS at 09:30

## 2022-07-17 RX ADMIN — SODIUM CHLORIDE 250 ML: 9 INJECTION, SOLUTION INTRAVENOUS at 09:27

## 2022-07-17 RX ADMIN — MIDODRINE HYDROCHLORIDE 5 MG: 5 TABLET ORAL at 08:47

## 2022-07-17 RX ADMIN — LEVALBUTEROL HYDROCHLORIDE 1.25 MG: 1.25 SOLUTION RESPIRATORY (INHALATION) at 08:23

## 2022-07-17 RX ADMIN — CALCIUM CARBONATE 600 MG (1,500 MG)-VITAMIN D3 400 UNIT TABLET 1 TABLET: at 08:47

## 2022-07-17 RX ADMIN — MAGNESIUM SULFATE IN WATER 2 G: 40 INJECTION, SOLUTION INTRAVENOUS at 09:33

## 2022-07-17 RX ADMIN — THERA TABS 1 TABLET: TAB at 08:47

## 2022-07-17 RX ADMIN — INSULIN GLARGINE 15 UNITS: 100 INJECTION, SOLUTION SUBCUTANEOUS at 21:19

## 2022-07-17 ASSESSMENT — ACTIVITIES OF DAILY LIVING (ADL)
ADLS_ACUITY_SCORE: 40
ADLS_ACUITY_SCORE: 40
ADLS_ACUITY_SCORE: 42
ADLS_ACUITY_SCORE: 42
ADLS_ACUITY_SCORE: 40
ADLS_ACUITY_SCORE: 42
ADLS_ACUITY_SCORE: 38
ADLS_ACUITY_SCORE: 42
ADLS_ACUITY_SCORE: 40
ADLS_ACUITY_SCORE: 42
ADLS_ACUITY_SCORE: 42
ADLS_ACUITY_SCORE: 40

## 2022-07-17 NOTE — PLAN OF CARE
Major Shift Events: Alert to lethargic and oriented overnight. Slow to respond. 9/10 pain in abdomen at beginning of shift; PRN dilaudid given. Afebrile; sinus rhythm; HR 70s-90s; SBP 100s-120s. Tolerated BiPAP all night: 30%/22/5. CT x2 both to -20 suction. Brown output in NG to LIS. TF at 35 mL/hr with standard flush through NJ. Regular diet. Rectal tube removed at 2000 (7/16). Incontinent of bowel and bladder. 1 larger, loose brown/green BM overnight; 2 unmeasured voids of urine.    Plan: Transfer to [facility] when bed available. Gastric emptying study.  For vital signs and complete assessments, please see documentation flowsheets.

## 2022-07-17 NOTE — PROGRESS NOTES
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 Sunshine. This was complicated by left upper extremity acute limb ischemia s/p left radial thrombectomy on 7/1/22. Recovering renal function, will hold off on tunneled dialysis line at this time.      - Right Pleural tube remains to suction, elevated output, can ambulate off suction.   - Left pleural tube remains to suction for elevated output. Left tube site needs Vaseline gauze gauze wrapped around tube at the skin to avoid air leaking into chest. Can ambulate off suction.   - Bilateral chest tubes stripped 7/17, small amount of serous output at that time  - Removed pericardial tube 7/15 without immediate complication.     Discussed with Dr Rob Martell PA-C  Cardiothoracic Surgery  p: [phone]

## 2022-07-17 NOTE — PROGRESS NOTES
"Nephrology Inpatient Visit Note    Reason for Visit  Acute kidney injury requiring dialysis    Subjective   The following portions of the patient's chart were reviewed: current medications, problem list, laboratory workup, and diagnostic data.    She feels okay this morning.  She tolerated hemodialysis well yesterday.  She is more awake in comparison to the last 3 days.    Objective   Vital Signs  Blood pressure (!) 143/66, pulse 81, temperature 98.6  F (37  C), resp. rate 14, height 1.575 m (5' 2\"), weight 71.6 kg (157 lb 13.6 oz), SpO2 97 %, not currently breastfeeding.  I/O last 3 completed shifts:  In: 1490 [NG/GT:750]  Out: 3047 [Urine:52; Emesis/NG output:975; Other:1000; Stool:250; Chest Tube:770]    Physical Exam   Gen: Comfortable, conversant    Lungs: CTAB     Abdomen: Soft, and non-tender    Ext: Some lower extremity edema        Diagnostic Data  Lab Results   Component Value Date    WBC 10.2 07/17/2022    HCT 27.3 (L) 07/17/2022    MCV 98 07/17/2022     07/17/2022     (L) 07/17/2022    POTASSIUM 3.5 07/17/2022    HCO3 46 (HH) 07/13/2022    BUN 41.1 (H) 07/17/2022    ALBUMIN 2.9 (L) 07/15/2022       Assessment & Plan   Acute kidney injury    62 yom with hx of HTN, Hep C, osetopenia, previous polysubstance use (stopped   2019) and severe COPD who is s/p BSLT on 6/28/2022.      The patient was started on hemodialysis on 7/14 given rising creatinine and slow clearance.  She dialyzed the patient 3 days in a row.  No dialysis planned for today. Will continue to assess on a daily basis    Please ensure that all medications are renally adjusted.  She remains nonoliguric and has a decent chance of kidney recovery.     The plan of care was discussed with the patient, nursing staff, and primary service. Total time is 35 minutes. More than 50 percent of my time was spent on counseling or coordination of care.    Total time is 35 minutes        "

## 2022-07-17 NOTE — PROGRESS NOTES
[facility]    Medicine Progress Note - Hospitalist Service, GOLD TEAM 10    Date of Admission:  6/28/2022    Assessment & Plan            Sofie Rodriguez is a 62 year old female admitted on 6/28/2022. She has PMH of end stage COPD s/p b/l lung transplant on 6/28/2022, HTN, HFpEF, h/o hepC, oteopenia, and former methamphetamine use, and she is currently transferring to [place] [address] [facility] from MICU on 7/15/2022. She was admitted to the MICU on 7/13/20222 with prior hospital course complicated by left upper extremity acute limb ischemia s/p left radial thrombectomy on 7/1/2022. She was primarily treated for acute hypercapnic respiratory failure on intermittent BIPAP with worsening BACILIO while in the MICU. Currently stable on 2L NC.     Summary of today's plan:   - Clamp NG and see if patient tolerates; may be able to pull this afternoon. If abdominal pain/nausea return, continue to LIMS and re-assess tomorrow.   - AXR shows improvement in gastric distension  - Continue to encourage OOB and up in chair  - BIPAP at night   - EP consult for recurrent SVT/afib and flutter episodes, appreciate assistance  - Schedule zofran at 0600 for meds         #End stage COPD s/p BOLT 6/28/2022  #Acute hypercapnic respiratory failure, likely 2/2 decreased central respiratory drive vs respiratory muscle weakness  Patient received bilateral lung transplant for end-stage COPD on 6/28. Chart review shows hypercarbia starting on 7/2 that was not adequately compensated by respiration. On prior exam, patient noted to take shallow, infrequent breaths with occasional episodes of hyperventilation. Ddx for hypercapnic respiratory failure currently includes decreased central respiratory drive, respiratory muscle weakness, and intrinsic lung pathology. Low concern for encephalitis given patient is on minimal sedating medications with appropriate mental status. TSH normal last week, so unlikely hypothyroidism.  Bedside US in MICU showed normal diaphragmatic movement, though formal SNIFF test was performed on 7/14 showed R hemidiaphragm palsy. Of note transplanted lungs were also considered small for body size and could contribute to decreased respiratory compensation for hypercarbia. BIPAP improved patient's pH, and last ABG on 7/13 showed pH of 7.35 while on nasal cannula. Elevated pCO2 and bicarb still present. Most recent bronch 7/12 for clearance/inspection given weak cough. VBG showed improved CO2 immediately after HD yesterday, but was back to previous this AM. Patient did not use BIPAP last night due to pain (nonspecific). Supp O2 decreased from 2L to RA this morning.   - f/u bronch studies from 7/12  - trend VBG  - Continue BIPAP at night   - NC with intermittent BIPAP for night and daytime naps; goal to keep O2sat above 92%  - f/u myasthenia gravis panel  - oxycodone decreased from 5 to 2.5-5mg q4h PRN     #Post-transplant  Immunosuppression:  - Prednisone 15mg BID  - Tacrolimus 5mg BID  - MMF 1000mg BID  Prophylasxis:  - CMV - Valgancyclover  - Thrush - PO nysatin  - PJP - TMP-SMX (currently held given BACILIO)     #Chest tubes  - 2 chest tubes (basilar) in place currently (pericardial drain was removed today). CT chest 7/14 showing unchanged bilateral effusions and unchanged biapical pneumothoraces with resolved left basilar pneumothorax; bibasilar chest tubes in place with pericardial drain (which was removed this morning).   - daily CXR  - CV surgery and transplant pulm following     #Shock of unclear etiology, improving  #H/o HTN  #H/o HFpEF  Previously on levophed until 7/10. Last echo 7/7 unchanged from previous showing normal EF with good LV function, so unlikely cardiogenic shock. S/p hydrocortisone 7/6-7/9 and fludrocortisone 7/607/11 without significant improvement, so unlikely adrenal insufficiency. Had 1 episode of BP down to 90s this morning, but was otherwise around 120-130s systolic today (7/15).   -  Decrease Midodrine to 5 mg TID   - Holding PTA lasix 20mg daily     #C.diff  #Abdominal pain   Worsening diarrhea via rectal pouch with new onset abd pain that is significantly worse today (7/15). C. Diff positive on 7/11 with vanc started on 7/12.   - PO vanc 125mg QID (7/12-7/26)  - holding bowel regimen  - abd ct showed no signs of toxic megacolon. Very distended stomach and NG was placed 7/15 for decompression.      #NJ tube  Currently NPO given abd pain, will re-evaluate after ct abd today (7/15).   Feeding regimen:   - Adult Formula Drip Feeding: Continuous Novasource Renal; Nasojejunal; Goal Rate: 35; initiate at goal rate; mL/hr; Medication - Feeding Tube Flush Frequency: At least 15-30 mL water before and after medication administration and with tube clogging     #BACILIO  #Hypermagnesemia  #Hyperphosphatemia  Baseline renal function was normal prior to surgery. New onset renal failure after transplant, likely multifactorial due to pre-renal hypotension (2.5h bypass during surgery) and intra-renal from use of nephrotoxic agents including tacrolimus and vancomycin. 6/28 UA showed hyaline casts. Cr initially improved but now rising again along with BUN. Nephrology is on consult and has patient on intermittent HD starting 7/14 via non-tunneled R internal jugular cath. HD on 7/14 was limited by tachyarrhythmia. Patient is scheduled for another session today.   - nephrology following  - R internal jugular cathether placed with HD line on 7/14, second dialysis session today  - discontinued sodium bicarb tabs     #Tachyarrthymia  SVT vs afib. Previously appeared to be positional. Had an occurrence during HD on 7/14. No episodes today 7/15.   - Not on beta blocker currently given BP needing midodrine     #Stress-induced hyperglycemia  Blood glucose was consistently in high 100s to mid 200s over past 2 days. Insulin was held while patient was NPO. Received half dose glargine this AM.   - Previously on 15U glargine BID;  will re-evaluate insulin need pending CT abd today     #Anemia  Initially from acute blood loss. Hb dropped to 6.8 on 7/14, requiring 1U pRBC. Post-transfusion Hb 9.4 > 8.3 (7/15).   - continue to monitor  - transfuse for hgb<7       Diet: Adult Formula Drip Feeding: Continuous Novasource Renal; Nasojejunal; Goal Rate: 35; initiate at 15 ml/hr and advance by 10 mL Q8H to goal; mL/hr; Medication - Feeding Tube Flush Frequency: At least 15-30 mL water before and after medication admin...  Regular Diet Adult    DVT Prophylaxis: Heparin SQ  Dong Catheter: Not present  Central Lines: PRESENT  CVC Double Lumen Right Internal jugular Non - tunneled-Site Assessment: WDL  Cardiac Monitoring: None  Code Status: Full Code      Disposition Plan         The patient's care was discussed with the Bedside Nurse and Patient.    Martha Mahan MD  Hospitalist Service, [address]  Securely message with the Vocera Web Console (learn more here)  Text page via [employer] Paging/Directory   Please see signed in provider for up to date coverage information      Clinically Significant Risk Factors Present on Admission                      ______________________________________________________________________    Interval History   No events overnight   Was doing well this morning, feeling less abdominal pain and SOB, but then had an episode of rapid heart rate and felt poorly   No vomiting but some nausea with medications       4 point ROS otherwise negative.     Data reviewed today: I reviewed all medications, new labs and imaging results over the last 24 hours. I personally reviewed the AXR image(s) showing improved gastric distension.    Physical Exam   Vital Signs: Temp: 98.2  F (36.8  C) Temp src: Oral BP: 138/72 Pulse: 76   Resp: 8 SpO2: (!) 88 % O2 Device: Nasal cannula Oxygen Delivery: 1 LPM  Weight: 157 lbs 13.59 oz    General: Awake, alert, no distress.   Eyes: Sclera  anicteric. No conjunctival injection. PERRLA.   Mouth: Oropharynx benign, no tonsillar exudate. MMM.   Neck: Supple, full ROM, no adenopathy.  CV: Normal rate and rhythm, normal S1 and S2. No murmurs, rubs, gallops. Radial pulses 2+ and symmetric.  Respiratory: No accessory muscle use. Lungs diminished bilaterally. No wheezing, rhonchi, or rales.  Abdomen: Soft, non-distended. Non-tender to palpation. No rebound tenderness or guarding. +BS.   MSK: No joint redness, deformity or swelling.   Extremities: Pitting edema to the knees bilaterally   Skin: Warm, dry. No rash on visible skin.   Neuro: Alert, oriented to conversation and situation. Face symmetric, speech intact. Moves all extremities.   Psych: Flat affect. In better spirits today.       Data   Recent Labs   Lab 07/17/22  1208 07/17/22  0929 07/17/22  0636 07/16/22  0843 07/16/22  0531 07/15/22  0809 07/15/22  0422 07/15/22  0420 07/14/22  0752 07/14/22  0459   WBC  --   --  10.2  --  10.1  --  9.8  --    < > 10.5   HGB  --   --  8.6*  --  8.7*  --  8.3*  --    < > 6.8*   MCV  --   --  98  --  100  --  102*  --    < > 102*   PLT  --   --  213  --  229  --  262  --    < > 274   NA  --   --  127*  --  133*  --   --  135*   < > 140   POTASSIUM  --   --  3.5  --  4.8  --   --  4.6   < > 4.3   CHLORIDE  --   --  90*  --  97*  --   --  93*   < > 92*   CO2  --   --  27  --  31*  --   --  34*   < > 42*   BUN  --   --  41.1*  --  66.2*  --   --  123.0*   < > 155.0*   CR  --   --  1.62*  --  1.96*  --   --  2.65*   < > 3.00*   ANIONGAP  --   --  10  --  5*  --   --  8   < > 6*   ESTUARDO  --   --  8.4*  --  8.7*  --   --  8.7*   < > 8.6*   * 161* 148*   < > 144*   < >  --  245*   < > 200*  193*   ALBUMIN  --   --   --   --   --   --   --  2.9*  --  2.6*   PROTTOTAL  --   --   --   --   --   --   --  4.9*  --  4.6*   BILITOTAL  --   --   --   --   --   --   --  0.2  --  0.2   ALKPHOS  --   --   --   --   --   --   --  74  --  64   ALT  --   --   --   --   --   --   --   32  --  22   AST  --   --   --   --   --   --   --  31  --  22   LIPASE  --   --   --   --   --   --   --  21  --   --     < > = values in this interval not displayed.     Recent Results (from the past 24 hour(s))   XR Abdomen Port 1 View    Narrative    EXAM: XR ABDOMEN PORT 1 VIEWS  7/16/2022 8:24 PM      HISTORY: worsening abdominal pain    COMPARISON: Same day abdominal x-ray    FINDINGS: Enteric tubing with tip overlying the third part of  duodenum. Nasogastric tube tip overlying the stomach. Partially  visualized thoracotomy wires. Bibasilar chest tubes.    Residual contrast within the stomach. Nonobstructive bowel gas  pattern. No free air within the abdomen. No pneumatosis. No portal  venous gas. No abnormal calcifications. No visualized masses.    Visualized portions of the lung demonstrate no focal airspace  opacities. Soft tissues and osseous structures are unremarkable.      Impression    IMPRESSION:   Residual contrast within the stomach. No radiographic findings to  explain patient's abdominal pain.    I have personally reviewed the examination and initial interpretation  and I agree with the findings.    ALVINA HARRY MD         SYSTEM ID:  [id number]     Medications     dextrose Stopped (07/15/22 1801)       acetaminophen  975 mg Oral or Feeding Tube TID     acetylcysteine  2 mL Nebulization 4x Daily     aspirin  81 mg Oral Daily     calcium carbonate 600 mg-vitamin D 400 units  1 tablet Oral BID w/meals     ceFAZolin  2 g Intravenous Pre-Op/Pre-procedure x 1 dose     heparin ANTICOAGULANT  5,000 Units Subcutaneous Q8H TONY     insulin aspart  1-12 Units Subcutaneous Q4H     insulin glargine  15 Units Subcutaneous BID     lactobacillus rhamnosus (GG)  1 capsule Oral BID     levalbuterol  1.25 mg Nebulization 4x Daily     metoprolol tartrate  25 mg Oral BID     midodrine  5 mg Oral or Feeding Tube Q8H     multivitamin, therapeutic  1 tablet Per Feeding Tube Daily     mycophenolate  1,000 mg Oral  or Feeding Tube BID     nystatin   Topical BID     nystatin  1,000,000 Units Swish & Swallow 4x Daily     [START ON 7/18/2022] ondansetron  4 mg Oral Daily     pantoprazole  40 mg Oral or Feeding Tube BID     predniSONE  15 mg Per Feeding Tube BID     protein modular  1 packet Per Feeding Tube Daily     simethicone  80 mg Oral 4x Daily     sodium chloride (PF)  3 mL Intracatheter Q8H     sodium chloride (PF)  3 mL Intracatheter Q8H     tacrolimus  5 mg Per Feeding Tube BID IS     thiamine  100 mg Oral or Feeding Tube Daily     valGANciclovir  450 mg Oral or NG Tube Once per day on Mon Thu     vancomycin  125 mg Oral or Feeding Tube Q6H TONY

## 2022-07-17 NOTE — PLAN OF CARE
ICU End of Shift Summary. See flowsheets for vital signs and detailed assessment.    Changes this shift: Pt had an episode of SVT with aflutter for 45min after transfer to chair. PRN Metoprolol x2 and vagal maneuvers, pt self converted to SR. EP consulted, see note. Scheduled Metoprolol was increased. Attempted to clamp NG, after 4 hours pt reported worsening abdominal pain and was reconnected to LIS. Worsening abd cramping this afternoon/evening with some relief from PRN Oxycodone. Provider paged regarding worsening abd pain. Multiple loose BM's. Hooked up external catheter with no output, but pt reports feeling as though she had urinated during BM's.     Plan: IMC tx orders. BP cuff needs to be on legs d/t PIV's in both arms and patient is a hard stick.      Goal Outcome Evaluation:    Plan of Care Reviewed With: patient, daughter     Overall Patient Progress: no change    Outcome Evaluation: episode of SVT/aflutter, pain in abdomen continues

## 2022-07-17 NOTE — CONSULTS
Cardiac Electrophysiology consultation      Reason For Consultation: SVT     HPI:    61 y/o lady s/p BSLT 6/28/22 has developed intermittent SVT.    Hx significant for:  Severe COPD s/p BSLT 6/28/22  HTN  HCV  Osteopenia  Prior polysubstance abuse    Currently undergoing respiratory therapy. No acute distress. Mostly unaware of her heart racing during these SVT episodes. Have occurred during HD and occurred this morning. Notes some fatigue after these runs but they may not be related. No prior cardiac hx including ever being told of any arrhythmias, palpitations, syncope or pre syncope.    Past Medical History:      Past Medical History:   Diagnosis Date     CHF (congestive heart failure) (H)      COPD (chronic obstructive pulmonary disease) (H)      Hepatitis 2017    Hep C, [facility]     HTN (hypertension)      Osteopenia        Past Surgical  History:      Past Surgical History:   Procedure Laterality Date     COLONOSCOPY  2015     CV CORONARY ANGIOGRAM N/A 6/30/2021    Procedure: CV CORONARY ANGIOGRAM;  Surgeon: Alexander Cuellar MD;  Location:  [facility]     CV RIGHT HEART CATH MEASUREMENTS RECORDED N/A 6/30/2021    Procedure: CV RIGHT HEART CATH;  Surgeon: Alexander Cuellar MD;  Location:  [facility]     ENT SURGERY  1974    tonsillectomy     ESOPHAGOGASTRODUODENOSCOPY, WITH NASOGASTRIC TUBE INSERTION N/A 7/1/2022    Procedure: ESOPHAGOGASTRODUODENOSCOPY, WITH NASOJEJUNAL TUBE INSERTION;  Surgeon: Ozzy Nickerson MD;  Location:  GI     HAND SURGERY       LEEP TX, CERVICAL  04/07/2017    HECTOR III     LYMPH NODE BIOPSY Left 2005    Left axilla, benign- [place]     THROMBECTOMY UPPER EXTREMITY Left 7/2/2022    Procedure: LEFT RADIAL ARM THROMBECTOMY;  Surgeon: Christie Graham MD;  Location:  OR     TRANSPLANT LUNG RECIPIENT SINGLE X2 Bilateral 6/28/2022    Procedure: Clamshell Incision, Bilateral Sequential Lung Transplant, On Cardiopulmonary Bypass, Flexible  "Bronchoscopy;  Surgeon: Sue Sunshine MD;  Location: [facility] OR       Family History:    History reviewed. No pertinent family history.    Social History:      Social History     Socioeconomic History     Marital status: Single     Spouse name: Not on file     Number of children: Not on file     Years of education: Not on file     Highest education level: Not on file   Occupational History     Not on file   Tobacco Use     Smoking status: Former Smoker     Years: 30.00     Types: Cigarettes     Quit date: 2020     Years since quittin.6     Smokeless tobacco: Never Used   Substance and Sexual Activity     Alcohol use: Not Currently     Drug use: Not Currently     Types: Marijuana, Methamphetamines     Comment: hx:marijuana and methamphetamine-quit both unsure ?  2-3 yrs ago     Sexual activity: Not on file   Other Topics Concern     Parent/sibling w/ CABG, MI or angioplasty before 65F 55M? Not Asked   Social History Narrative     Not on file     Social Determinants of Health     Financial Resource Strain: Not on file   Food Insecurity: Not on file   Transportation Needs: Not on file   Physical Activity: Not on file   Stress: Not on file   Social Connections: Not on file   Intimate Partner Violence: Not on file   Housing Stability: Not on file       ROS:    A complete review of systems is negative except as noted above in the HPI.    Physical Exam:   Vitals: /60   Pulse 78   Temp 98.6  F (37  C) (Oral)   Resp 24   Ht 1.575 m (5' 2\")   Wt 71.6 kg (157 lb 13.6 oz)   SpO2 100%   BMI 28.87 kg/m    Gen: NAD, older than stated age  Neck: no JVD  Chest: coarse b/l  Cardiovascular: RRR, no M/R/G  Abd: soft, NT/ND  Neuro: grossly intact  Extremities: no LE edema         Relevant labs, imaging, medications and procedures were reviewed.    EKG 22: likely flutter vs AT with 2:1 conduction  EKG 22: Afib with RVR    TTE: LVEF 55-60%    Telemetry: today's episode appears to have been initiated with a " PAC, intermittently organized and occasionally degenerates in Afib. Terminated following IV metoprolol. Adenosine was not administered.    Na 127  K 3.5  BUN/Cr 41/1.62  Ca 8.4  Mg 1.9    WBC 10.2  Plts 213  Hb 8.6    Assessment and Recommendations:   61 y/o lady s/p BSLT 6/28/22 has developed intermittent SVT.    Hx significant for:  Severe COPD s/p BSLT 6/28/22  HTN  HCV  Osteopenia  Prior polysubstance abuse    Hemodynamically stable during her episodes of SVT and seemingly asymptomatic. HR up to 180 bpm per EKG 7/14/22. Per telemetry, today's episode appears to have been initiated with a PAC, intermittently organized and occasionally degenerates in Afib which converted to NSR with no further atrial arrhythmia noted during conversion. Terminated following IV metoprolol. EKG 7/14/22: likely flutter vs AT with 2:1 conduction. EKG 7/17/22: Afib with RVR. Discussed management options with the patient and explained that these episodes are not life threatening but she should be on a blood thinner to reduce CVA risk. Hb stable. No prior hx of any SVT.    #Paroxysmal Afib- non valvular, asymptomatic  #Paroxysmal AFL/AT  #HTN  -Titrate up metoprolol tartrate as tolerated and can increase dosing to Q8hr if needed  -Start eliquis 5mg BID when deemed safe per surgery ([id number])  -Obtain TSH (prior in '18)  -Telemetry  -Discharge on [facility] for 14 days  -EP follow up in 1-2 months for further management discussion    I discussed the patient with Dr. Larios.    Raji Rebolledo MD   Cardiac electrophysiology fellow    I very much appreciated the opportunity to  assess Sofie Rodriguez in the hospital with CVEP Fellow Dr Rebolledo. Patient is consulted to EP for evaluation of several forms of SVT including A Fib, At flutter and poss AVNRT.  I agree with the note above which summarizes my findings and current recommendations.  Please do not hesitate to contact my office if you have any questions or concerns.      Miguel Angel Larios  MD  Cardiac Arrhythmia Service  [facility]  [phone]

## 2022-07-17 NOTE — PROGRESS NOTES
Pulmonary Medicine  Cystic Fibrosis - Lung Transplant Team  Progress Note  2022       Patient: Sofie Rodriguez  MRN: [id number]  : 1960 (age 62 year old)  Transplant: 2022 (Lung), POD#19  Admission date: 2022    Assessment & Plan:     Sofie Rodriguez is a 62 year old female with a PMH significant for end-stage COPD, HTN, HFpEF, Mycobacterium peregrinum colonization, h/o hepatitis C, HECTOR s/p LEEP procedure, osteopenia, and former methamphetamine use.  Pt. is now s/p BSLT on 22, lungs slightly undersized.   Persistent low dose pressor needs post-op through 7/10.  Extubated POD #2 but with persistent hypercapnia, mostly compensated and only slightly improved with intermittent NIPPV.  Also with left radial artery thrombus (presumed secondary to arterial line) s/p thrombectomy /, BACILIO, and C diff.  Rehabilitation and airway clearance limited by dysrhythmia and gastric discomfort.     Today's recommendations:  - CPT QID, encourage consistent participation d/t concern for ineffective airway clearance (completed no therapies yesterday, final therapy deferred by RT d/t staffing but recent transplant patients should have aggressive airway clearance prioritized)  - Wean O2 as able to keep >92%, NIPPV overnight as tolerated given persistent hypercapnia  - Recommend to strip chest tubes qshift  - Daily 2-view CXR  - Aggressive volume removal as able  - Encourage increased activity including up in chair (minimal day time in bed) and active participation in PT/OT given concern for deconditioning and ineffective airway clearance  - Tacrolimus level ordered tomorrow  - Prednisone taper next due   - Dapsone for PJP ppx deferred pending stable hgb  - EP consult given recurrent dysrhythmia  - Gastric emptying study ordered  - NG to LIS prn, agree with clamping trials to assess for readiness for removal  - DSA, EBV, IgG, and donor risk labs ordered      S/p bilateral sequential lung transplant  (BSLT) for end stage COPD:  Persistent hypercapneic respiratory failure:   Persistent hypotension:   Bilateral hydroPTX:  Right hemidiaphragm palsy: Explant pathology with severe emphysema with subpleural bullae formation, changes of chronic bronchitis, subpleural scars and patchy pulmonary edema; benign hilar lymph nodes.  No evidence of PGD post-op.  Extubated 6/30.  Persistent hypercapnia without dyspnea, appears to be a respiratory drive problem.  Diaphragm assessment by US did not show dysfunction (per CVICU team) and metabolic cart study only 6 mins but not supportive of overfeeding.  Some improvement with BiPAP, limited tolerance in part due to anxiety.  Chest CT (7/7) with bilateral small hydroPTX, chest wall subcutaneous emphysema, air collection of right chest wall anteriorly associated with pleura on right, and some narrowing of left sided anastomosis.  Persistent low dose pressor requirement, weaned off 7/10, remains on scheduled midodrine (also s/p hydrocortisone 7/6-7/9 and fludrocortisone 7/6-7/11).  Bronch (7/12) given CXR changes with small amount of granulation tissue posteriorly in right anastomosis (no stenosis or dehiscence), thick brown secretions in proximal airways, and right mainstem/RUL mucous plug (removed).  CT chest (7/14) with stable biapical PTX and stable dependent bilateral pleural effusions.  SNIFF (7/14) notable for right hemidiaphragm palsy.  On 1L NC with BiPAP overnight (improvement in hypercapnea).  - DSA one month post-transplant (7/28, ordered)  - Ammonia monitoring twice weekly (screening for hyperammonemia post-lung transplant)  - Nebs: levalbuterol and Mucomyst QID   - Pulmonary toilet with chest physiotherapy QID, encourage consistent participation d/t concern for ineffective airway clearance  (completed no therapies yesterday, final therapy deferred by RT d/t staffing but recent transplant patients should have aggressive airway clearance prioritized)  - Aerobika and  incentive spirometry hourly while awake  - Supplemental oxygen as needed to maintain SpO2 >92% (wean as able), NIPPV overnight as tolerated given persistent hypercapnia  - Chest tubes managed by surgical team, recommend to strip chest tubes qshift  - Daily 2-view CXR, today with grossly stable bibasilar hazy opacities and small effusions R>L (personally reviewed)  - Aggressive volume removal as able  - Encourage increased activity including up in chair (minimal day time in bed) and active participation in PT/OT given concern for deconditioning and ineffective airway clearance     Immunosuppression:  Induction therapy with basiliximab (and high dose IV steroid) given intraoperatively, repeating basiliximab dose on POD#4.  - Tacrolimus 5 mg BID (via NJ tube, held 7/11-7/12 for supratherapeutic level, peak level appropriate 7/11).  Goal level 8-12.  Repeat level 7/18 (ordered).  - MMF 1000 mg BID (decreased 7/10 due to diarrhea)  - Prednisone 15 mg BID, next taper due 7/21 (not yet ordered)  Date AM dose (mg) PM dose (mg)   7/14/22 15 15   7/21/22 15 12.5   7/28/22 12.5 12.5   8/4/22 12.5 10   8/18/22 10 10   9/15/22 10 7.5   10/13/22 7.5 7.5   11/10/22 7.5 5   12/8/22 5 5   1/5/23 5 2.5      Prophylaxis:   - Dapsone for PJP ppx deferred pending stable hgb (Bactrim held 7/7 for BACILIO)  - VGCV for CMV ppx (started 7/7)  - Nystatin for oral candidiasis ppx, 6 month course  - See below for serologies and viral ppx:    Donor Recipient Intervention   CMV status Positive Positive Valganciclovir POD #8-90   EBV status Positive Positive EBV check monthly (7/28, ordered)   HSV status N/A Positive Not indicated      ID:  H/o M. peregrinum colonization: Donor bronch cultures (OSH) with Strep beta hemolytic (not group A).  Recipient cultures as below.  - IgG at one month (7/28, ordered)  - Bronch cultures (7/12) NGTD, follow  - AFB bronch culture (6/28, 6/29) NGTD, AFB to be sent on all future bronchs     Streptococcus  pneumoniae:  Stenotrophomonas maltophilia: Noted in recipient cultures at time of transplant.  S/p ceftazidime 6/28-7/10, vancomycin 7/7-7/8 and 6/28-6/30, and levofloxacin 7/10-7/12 for total 2 week ABX course.     H/o hepatitis C: Diagnosed in 1980s, 2 mos of treatment, quant negative since 10/2017, last positive 2/20/17 ([phone]).  Glenis positive on 6/2021 with negative HCV PCR.  H/o remote EtOH abuse.  MR elastography (4/27/21) with hepatology review and consult without any concerns post transplant.  Hepatitis C RNA negative and hepatitis C antibody positive (old) on 6/28.     PHS risk criteria donor:  Additional labs required post-transplant (between 4-8 weeks post-op): Hepatitis B, Hepatitis C, and HIV by SHERI ([id number], ordered 7/28).     Other relevant problems managed by primary team:      SVT:   Aflutter with RVR: SVT first noted on 7/14, prior to HD line placement.  Continues intermittently.  Aflutter with RVR to 200 7/17 triggered by activity.  - Metoprolol per primary team (started 7/15)  - Continue to wean midodrine as able  - AC recommended  - EP consult given recurrent dysrhythmia    Left hand ischemia: Radial artery thrombosis identified on duplex doppler.  S/p thrombectomy on 7/2.  Completed high intensity heparin course.  Continue daily aspirin.      BACILIO:   Hyperkalemia: Likely multifactorial including medications (Bactrim, tacrolimus) and hypotension.  Fludrocortisone 7/6-7/11.  Potassium now stable.  S/p non-tunneled HD line 7/14.  Initial iHD run 7/14 limited by afib with RVR vs SVT.  - Tacrolimus monitoring as above  - Volume management per nephrology and ICU team     Abdominal pain: Noted 7/15, cramping pain.  ACR with large stool burden in colon, no obstruction or distention.  Some nausea but no emesis.  CT abdomen (7/15) with moderate to large gastric distention, otherwise without obstruction.  NG placed for LIS with moderate to large output.  - Gastric emptying study ordered  - Schedule  simethicone   - NG to LIS prn, agree with clamping trials to assess for readiness for removal  - Additional management per primary    C diff infection: Abdominal pain with diarrhea noted 7/8, AXR without dilated bowel, moderate colonic stool burden.  C diff positive 7/11.    - PO vancomycin (7/11) per ICU team     We appreciate the excellent care provided by the [place] [id number] team.  Recommendations communicated via telephone and this note.  Will continue to follow along closely, please do not hesitate to call with any questions or concerns.     Patient discussed with Dr. Miller.    Catherine Johnson, DNP, APRN, CNP  Inpatient Nurse Practitioner  Pulmonary CF/Transplant     Subjective & Interval History:     On BiPAP overnight with increased iPAP from 18 to 22 and improvement in morning VBG.  On RA briefly this morning, but placed on 1L NC this morning with episode of aflutter with RVR to 200.  Hemodynamically stable but with generalized ill feeling during episode.  Resolved after about an hour with electrolyte replacement, small IVF bolus, and two doses of IV metoprolol.  Transient increase in abdominal pain yesterday, unsure if this was 2/2 G tube clamping (which was brief).  Nausea with morning medications, likely exacerbated by rapid administration of large medication volume via NJ tube.  Chest tubes remain x2, moderate output that improved after stripping tubes.    Review of Systems:     C: No fever, no chills, no recent change in weight  INTEGUMENTARY/SKIN: No rash or obvious new lesions  ENT/MOUTH: No sore throat, no sinus pain, no nasal congestion or drainage  RESP: See interval history  CV: No chest pain, no palpitations, + peripheral edema, no orthopnea  GI: + occasional nausea, + incontinent loose stools, no reflux symptoms  : No dysuria, + incontinent  MUSCULOSKELETAL: See interval history  ENDOCRINE: Blood sugars persistently elevated >200  NEURO: No headache, no numbness or tingling  PSYCHIATRIC: See  "interval history    Physical Exam:     All notes, images, and labs from past 24 hours (at minimum) were reviewed.    Vital signs:  Temp: 98.6  F (37  C) Temp src: Oral BP: 138/60 Pulse: 78   Resp: 24 SpO2: 100 % O2 Device: Nasal cannula Oxygen Delivery: 1 LPM Height: 157.5 cm (5' 2\") Weight: 71.6 kg (157 lb 13.6 oz)  I/O:     Intake/Output Summary (Last 24 hours) at 7/17/2022 1248  Last data filed at 7/17/2022 1100  Gross per 24 hour   Intake 1790 ml   Output 1935 ml   Net -145 ml     Constitutional: Sitting up in a chair, in no apparent distress.   HEENT: Eyes with pink conjunctivae, anicteric.  Oral mucosa moist without lesions.  NG tube in left nare, NJ tube in right.  PULM: Mildly diminished air flow bilaterally.  No crackles, no rhonchi, no wheezes.  Non-labored breathing on 1L NC.  CV: Normal S1 and S2.  SR.  No murmur, gallop, + pericardial rub.  2+ BLE edema.   ABD: NABS, soft, nontender, nondistended.    MSK: Moves all extremities.  + muscle wasting.   NEURO: Alert, conversant.   SKIN: Warm, dry, fragile, scattered ecchymosis.  PSYCH: Mood stable    Lines, Drains, and Devices:  Peripheral IV 07/14/22 Anterior;Right Lower forearm (Active)   Site Assessment WDL 07/17/22 0800   Line Status Saline locked 07/17/22 0800   Phlebitis Scale 0-->no symptoms 07/17/22 0800   Infiltration Scale 0 07/17/22 0800   Infiltration Site Treatment Method  None 07/17/22 0800   Number of days: 3       Peripheral IV 07/17/22 Anterior;Left Upper forearm (Active)   Number of days: 0       CVC Double Lumen Right Internal jugular Non - tunneled (Active)   Site Assessment WDL 07/17/22 0800   Dressing Type Chlorhexidine disk;Transparent 07/17/22 0800   Dressing Status clean;dry;intact 07/17/22 0800   Dressing Change Due 07/24/22 07/17/22 0800   Tubing Change primary tubing 07/15/22 1654   Line Necessity yes, meets criteria 07/17/22 0800   Blue - Status saline locked 07/17/22 0800   Blue - Cap Change Due 07/22/22 07/17/22 0800   Red - " Status saline locked 07/17/22 0800   Red - Cap Change Due 07/22/22 07/17/22 0800   Phlebitis Scale 0-->no symptoms 07/14/22 1200   Infiltration? no 07/17/22 0800   Infiltration Scale 0 07/17/22 0800   Infiltration Site Treatment Method  None 07/17/22 0400   Was a vesicant infusing? no 07/16/22 0400   CVC Comment HD 07/17/22 0800   Number of days: 3     Data:     LABS    CMP:   Recent Labs   Lab 07/17/22  1208 07/17/22  0929 07/17/22  0636 07/17/22  0344 07/16/22  0843 07/16/22  0531 07/15/22  0809 07/15/22  0420 07/14/22  2356 07/14/22  2234 07/14/22  0752 07/14/22  0459 07/11/22  0805 07/11/22  0438   NA  --   --  127*  --   --  133*  --  135*  --  134*   < > 140   < > 139   POTASSIUM  --   --  3.5  --   --  4.8  --  4.6  --  4.4   < > 4.3   < > 4.4   CHLORIDE  --   --  90*  --   --  97*  --  93*  --  92*   < > 92*   < > 93*   CO2  --   --  27  --   --  31*  --  34*  --  36*   < > 42*   < > 38*   ANIONGAP  --   --  10  --   --  5*  --  8  --  6*   < > 6*   < > 8   * 161* 148* 164*   < > 144*   < > 245*   < > 207*   < > 200*  193*   < > 233*   BUN  --   --  41.1*  --   --  66.2*  --  123.0*  --  119.0*   < > 155.0*   < > 126.0*   CR  --   --  1.62*  --   --  1.96*  --  2.65*  --  2.66*   < > 3.00*   < > 2.78*   GFRESTIMATED  --   --  36*  --   --  29*  --  20*  --  20*   < > 17*   < > 19*   ESTUARDO  --   --  8.4*  --   --  8.7*  --  8.7*  --  8.5*   < > 8.6*   < > 8.4*   MAG  --   --  1.9  --   --  2.3  --  2.7*  --  2.5*   < > 3.1*   < > 2.7*   PHOS  --   --  4.1  --   --  4.6*  --  5.2*  --   --   --  5.1*   < > 5.4*   PROTTOTAL  --   --   --   --   --   --   --  4.9*  --   --   --  4.6*  --  4.6*   ALBUMIN  --   --   --   --   --   --   --  2.9*  --   --   --  2.6*  --  2.7*   BILITOTAL  --   --   --   --   --   --   --  0.2  --   --   --  0.2  --  <0.2   ALKPHOS  --   --   --   --   --   --   --  74  --   --   --  64  --  78   AST  --   --   --   --   --   --   --  31  --   --   --  22  --  16   ALT  --   --    --   --   --   --   --  32  --   --   --  22  --  22    < > = values in this interval not displayed.     CBC:   Recent Labs   Lab 07/17/22  0636 07/16/22  0531 07/15/22  0422 07/14/22  1901   WBC 10.2 10.1 9.8 10.3   RBC 2.78* 2.83* 2.72* 3.04*   HGB 8.6* 8.7* 8.3* 9.4*   HCT 27.3* 28.2* 27.6* 30.6*   MCV 98 100 102* 101*   MCH 30.9 30.7 30.5 30.9   MCHC 31.5 30.9* 30.1* 30.7*   RDW 15.6* 15.9* 16.5* 17.0*    229 262 295       INR: No lab results found in last 7 days.    Glucose:   Recent Labs   Lab 07/17/22  1208 07/17/22  0929 07/17/22  0636 07/17/22  0344 07/16/22  2338 07/16/22  1949   * 161* 148* 164* 104* 190*       Blood Gas:   Recent Labs   Lab 07/17/22  0636 07/16/22  0531 07/15/22  0420   PHV 7.35 7.29* 7.32   PCO2V 60* 73* 79*   PO2V 37 37 52*   HCO3V 33* 35* 41*   LINDY 6.2* 6.5* 11.9*   O2PER 13 30 30       Culture Data No results for input(s): CULT in the last 168 hours.    Virology Data:   Lab Results   Component Value Date    FLUAH1 Not Detected 06/29/2022    FLUAH3 Not Detected 06/29/2022    [id number] Not Detected 06/29/2022    IFLUB Not Detected 06/29/2022    RSVA Not Detected 06/29/2022    RSVB Not Detected 06/29/2022    PIV1 Not Detected 06/29/2022    PIV2 Not Detected 06/29/2022    PIV3 Not Detected 06/29/2022    HMPV Not Detected 06/29/2022       Historical CMV results (last 3 of prior testing):  No results found for: CMVQNT  No results found for: CMVLOG    Urine Studies    Recent Labs   Lab Test 07/08/22  0831 07/05/22  1004   URINEPH 5.5 5.5   NITRITE Negative Negative   LEUKEST Negative Negative   WBCU 1 5       Most Recent [facility] Pulmonary Function Testing (FVC/FEV1 only)  FVC-Pre   Date Value Ref Range Status   04/29/2022 1.82 L    11/11/2021 2.17 L    06/14/2021 2.00 L      FVC-%Pred-Pre   Date Value Ref Range Status   04/29/2022 58 %    11/11/2021 70 %    06/14/2021 64 %      FEV1-Pre   Date Value Ref Range Status   04/29/2022 0.51 L    11/11/2021 0.53 L    06/14/2021 0.54 L       FEV1-%Pred-Pre   Date Value Ref Range Status   04/29/2022 20 %    11/11/2021 21 %    06/14/2021 21 %        IMAGING    Recent Results (from the past 48 hour(s))   XR Abdomen Port 1 View    Narrative    Abdomen one view portable 7/15/2022 at 1754 hours    INDICATION: Check NG tube placement    COMPARISON: CT earlier today. Plain film earlier today 0605 hours    FINDINGS: Feeding tube tip is in the distal duodenum. NG tube tip and  sidehole projected in the stomach. Nonobstructive bowel gas pattern.  Clamshell sternotomy wires partially imaged. Left basilar thoracostomy  tube.      Impression    IMPRESSION: Feeding tube and NG tube as described.    ALVINA HARRY MD         SYSTEM ID:  [id number]   XR Chest 2 Views    Narrative    EXAM: XR CHEST 2 VW  7/16/2022 8:21 AM      HISTORY: Interval f/u s/p transplant    COMPARISON: 7/15/2022    FINDINGS: Two views of the chest. Postoperative changes of bilateral  lung transplantation. Stable bibasilar chest tubes. Right IJ central  venous catheter tip in the mid SVC. Feeding tube and enteric tube are  subdiaphragmatic. Interval removal of mediastinal drain.    Trachea is midline. Stable size of the cardiomediastinal silhouette.  No focal pulmonary opacity. Decreased pleural fluid in the left base.  Stable small amount of biapical pleural fluid/pleural thickening.  Slightly prominent interstitial markings. Perihilar and bibasilar  atelectasis. Small left apical pneumothorax. No appreciable  right-sided pneumothorax.      Impression    IMPRESSION:    1. Postoperative chest with interval removal of mediastinal drain in  stable position of bibasilar chest tubes. Remaining tubes and lines as  described above.  2. Decreased left basilar effusion.  3. Stable small left apical pneumothorax.  4. Mild pulmonary edema and atelectasis.    I have personally reviewed the examination and initial interpretation  and I agree with the findings.    YANNICK BENAVIDEZ MD         SYSTEM  ID:  [id number]   XR Abdomen Port 1 View    Narrative    Exam: XR ABDOMEN PORT 1 VIEWS, 7/16/2022 9:44 AM    Indication: Check interval change in gastric distension, any signs of  obstruction    Comparison: Abdominal radiograph 7/15/2022.    Findings:   Supine portable abdominal radiograph. Nasogastric tube tip and  sidehole overlying the stomach. Feeding tube has been advanced with  tip present towards the DJ flexure. Decreased gaseous distention of  the stomach with scattered contrast and debris in the lumen. No  significant gaseous distention of small or large bowel loops. No acute  osseous abnormalities.    Bilateral chest tubes in the lung bases partially visualized. Please  see separate chest radiographs.      Impression    Impression:   1.  Nasogastric tube tip and sidehole in the stomach.  2.  Feeding tube tip towards the DJ flexure.  3.  Decreased distention of the stomach with some residual contrast  material and debris in the lumen noted.    YANNICK BENAVIDEZ MD         SYSTEM ID:  [id number]   XR Abdomen Port 1 View    Narrative    EXAM: XR ABDOMEN PORT 1 VIEWS  7/16/2022 8:24 PM      HISTORY: worsening abdominal pain    COMPARISON: Same day abdominal x-ray    FINDINGS: Enteric tubing with tip overlying the third part of  duodenum. Nasogastric tube tip overlying the stomach. Partially  visualized thoracotomy wires. Bibasilar chest tubes.    Residual contrast within the stomach. Nonobstructive bowel gas  pattern. No free air within the abdomen. No pneumatosis. No portal  venous gas. No abnormal calcifications. No visualized masses.    Visualized portions of the lung demonstrate no focal airspace  opacities. Soft tissues and osseous structures are unremarkable.      Impression    IMPRESSION:   Residual contrast within the stomach. No radiographic findings to  explain patient's abdominal pain.    I have personally reviewed the examination and initial interpretation  and I agree with the findings.    ALVINA ANTHONY  MD PIERO         SYSTEM ID:  [id number]

## 2022-07-18 ENCOUNTER — APPOINTMENT (OUTPATIENT)
Dept: SPEECH THERAPY | Facility: CLINIC | Age: 62
DRG: 007 | End: 2022-07-18
Attending: THORACIC SURGERY (CARDIOTHORACIC VASCULAR SURGERY)
Payer: MEDICARE

## 2022-07-18 ENCOUNTER — APPOINTMENT (OUTPATIENT)
Dept: GENERAL RADIOLOGY | Facility: CLINIC | Age: 62
DRG: 007 | End: 2022-07-18
Attending: NURSE PRACTITIONER
Payer: MEDICARE

## 2022-07-18 ENCOUNTER — APPOINTMENT (OUTPATIENT)
Dept: CT IMAGING | Facility: CLINIC | Age: 62
DRG: 007 | End: 2022-07-18
Attending: PHYSICIAN ASSISTANT
Payer: MEDICARE

## 2022-07-18 LAB
ALBUMIN SERPL BCG-MCNC: 2.8 G/DL (ref 3.5–5.2)
ALP SERPL-CCNC: 85 U/L (ref 35–104)
ALT SERPL W P-5'-P-CCNC: 21 U/L (ref 10–35)
AMMONIA PLAS-SCNC: 18 UMOL/L (ref 11–51)
ANION GAP SERPL CALCULATED.3IONS-SCNC: 6 MMOL/L (ref 7–15)
APTT PPP: 23 SECONDS (ref 22–38)
AST SERPL W P-5'-P-CCNC: 19 U/L (ref 10–35)
ATRIAL RATE - MUSE: 308 BPM
BASE EXCESS BLDV CALC-SCNC: 5.2 MMOL/L (ref -7.7–1.9)
BASOPHILS # BLD AUTO: 0 10E3/UL (ref 0–0.2)
BASOPHILS NFR BLD AUTO: 0 %
BILIRUB DIRECT SERPL-MCNC: <0.2 MG/DL (ref 0–0.3)
BILIRUB SERPL-MCNC: 0.2 MG/DL
BUN SERPL-MCNC: 59.5 MG/DL (ref 8–23)
CALCIUM SERPL-MCNC: 8.6 MG/DL (ref 8.8–10.2)
CHLORIDE SERPL-SCNC: 94 MMOL/L (ref 98–107)
CREAT SERPL-MCNC: 2.04 MG/DL (ref 0.51–0.95)
DEPRECATED HCO3 PLAS-SCNC: 31 MMOL/L (ref 22–29)
DIASTOLIC BLOOD PRESSURE - MUSE: NORMAL MMHG
EOSINOPHIL # BLD AUTO: 0 10E3/UL (ref 0–0.7)
EOSINOPHIL NFR BLD AUTO: 0 %
ERYTHROCYTE [DISTWIDTH] IN BLOOD BY AUTOMATED COUNT: 16.1 % (ref 10–15)
ERYTHROCYTE [DISTWIDTH] IN BLOOD BY AUTOMATED COUNT: 16.3 % (ref 10–15)
GFR SERPL CREATININE-BSD FRML MDRD: 27 ML/MIN/1.73M2
GLUCOSE BLDC GLUCOMTR-MCNC: 163 MG/DL (ref 70–99)
GLUCOSE BLDC GLUCOMTR-MCNC: 164 MG/DL (ref 70–99)
GLUCOSE BLDC GLUCOMTR-MCNC: 180 MG/DL (ref 70–99)
GLUCOSE BLDC GLUCOMTR-MCNC: 199 MG/DL (ref 70–99)
GLUCOSE BLDC GLUCOMTR-MCNC: 214 MG/DL (ref 70–99)
GLUCOSE SERPL-MCNC: 202 MG/DL (ref 70–99)
HCO3 BLDV-SCNC: 32 MMOL/L (ref 21–28)
HCT VFR BLD AUTO: 27.4 % (ref 35–47)
HCT VFR BLD AUTO: 28.6 % (ref 35–47)
HGB BLD-MCNC: 8.7 G/DL (ref 11.7–15.7)
HGB BLD-MCNC: 8.9 G/DL (ref 11.7–15.7)
IMM GRANULOCYTES # BLD: 0.4 10E3/UL
IMM GRANULOCYTES NFR BLD: 3 %
INTERPRETATION ECG - MUSE: NORMAL
LYMPHOCYTES # BLD AUTO: 0.2 10E3/UL (ref 0.8–5.3)
LYMPHOCYTES NFR BLD AUTO: 2 %
MAGNESIUM SERPL-MCNC: 2.8 MG/DL (ref 1.7–2.3)
MCH RBC QN AUTO: 31.2 PG (ref 26.5–33)
MCH RBC QN AUTO: 31.2 PG (ref 26.5–33)
MCHC RBC AUTO-ENTMCNC: 31.1 G/DL (ref 31.5–36.5)
MCHC RBC AUTO-ENTMCNC: 31.8 G/DL (ref 31.5–36.5)
MCV RBC AUTO: 100 FL (ref 78–100)
MCV RBC AUTO: 98 FL (ref 78–100)
MONOCYTES # BLD AUTO: 0.5 10E3/UL (ref 0–1.3)
MONOCYTES NFR BLD AUTO: 5 %
NEUTROPHILS # BLD AUTO: 9.4 10E3/UL (ref 1.6–8.3)
NEUTROPHILS NFR BLD AUTO: 90 %
NRBC # BLD AUTO: 0 10E3/UL
NRBC BLD AUTO-RTO: 0 /100
O2/TOTAL GAS SETTING VFR VENT: 30 %
P AXIS - MUSE: NORMAL DEGREES
PCO2 BLDV: 62 MM HG (ref 40–50)
PH BLDV: 7.32 [PH] (ref 7.32–7.43)
PHOSPHATE SERPL-MCNC: 5.7 MG/DL (ref 2.5–4.5)
PLATELET # BLD AUTO: 186 10E3/UL (ref 150–450)
PLATELET # BLD AUTO: 246 10E3/UL (ref 150–450)
PO2 BLDV: 33 MM HG (ref 25–47)
POTASSIUM SERPL-SCNC: 4.3 MMOL/L (ref 3.4–5.3)
PR INTERVAL - MUSE: NORMAL MS
PROT SERPL-MCNC: 4.7 G/DL (ref 6.4–8.3)
QRS DURATION - MUSE: 72 MS
QT - MUSE: 294 MS
QTC - MUSE: 442 MS
R AXIS - MUSE: 61 DEGREES
RBC # BLD AUTO: 2.79 10E6/UL (ref 3.8–5.2)
RBC # BLD AUTO: 2.85 10E6/UL (ref 3.8–5.2)
SODIUM SERPL-SCNC: 131 MMOL/L (ref 136–145)
SYSTOLIC BLOOD PRESSURE - MUSE: NORMAL MMHG
T AXIS - MUSE: 187 DEGREES
TACROLIMUS BLD-MCNC: 11.9 UG/L (ref 5–15)
TME LAST DOSE: NORMAL H
TME LAST DOSE: NORMAL H
TSH SERPL DL<=0.005 MIU/L-ACNC: 1.49 UIU/ML (ref 0.3–4.2)
UFH PPP CHRO-ACNC: 0.2 IU/ML
VENTRICULAR RATE- MUSE: 136 BPM
WBC # BLD AUTO: 10.5 10E3/UL (ref 4–11)
WBC # BLD AUTO: 13.7 10E3/UL (ref 4–11)

## 2022-07-18 PROCEDURE — 71045 X-RAY EXAM CHEST 1 VIEW: CPT | Mod: 26 | Performed by: RADIOLOGY

## 2022-07-18 PROCEDURE — G1010 CDSM STANSON: HCPCS

## 2022-07-18 PROCEDURE — 94660 CPAP INITIATION&MGMT: CPT

## 2022-07-18 PROCEDURE — 250N000011 HC RX IP 250 OP 636: Performed by: CLINICAL NURSE SPECIALIST

## 2022-07-18 PROCEDURE — 250N000011 HC RX IP 250 OP 636

## 2022-07-18 PROCEDURE — 71250 CT THORAX DX C-: CPT | Mod: 26 | Performed by: RADIOLOGY

## 2022-07-18 PROCEDURE — 82140 ASSAY OF AMMONIA: CPT | Performed by: NURSE PRACTITIONER

## 2022-07-18 PROCEDURE — 250N000013 HC RX MED GY IP 250 OP 250 PS 637: Performed by: HOSPITALIST

## 2022-07-18 PROCEDURE — 250N000013 HC RX MED GY IP 250 OP 250 PS 637: Performed by: NURSE PRACTITIONER

## 2022-07-18 PROCEDURE — 120N000002 HC R&B MED SURG/OB UMMC

## 2022-07-18 PROCEDURE — 250N000013 HC RX MED GY IP 250 OP 250 PS 637: Performed by: SURGERY

## 2022-07-18 PROCEDURE — 250N000013 HC RX MED GY IP 250 OP 250 PS 637: Performed by: STUDENT IN AN ORGANIZED HEALTH CARE EDUCATION/TRAINING PROGRAM

## 2022-07-18 PROCEDURE — 250N000011 HC RX IP 250 OP 636: Performed by: SURGERY

## 2022-07-18 PROCEDURE — 85004 AUTOMATED DIFF WBC COUNT: CPT | Performed by: NURSE PRACTITIONER

## 2022-07-18 PROCEDURE — 250N000011 HC RX IP 250 OP 636: Performed by: STUDENT IN AN ORGANIZED HEALTH CARE EDUCATION/TRAINING PROGRAM

## 2022-07-18 PROCEDURE — 94668 MNPJ CHEST WALL SBSQ: CPT

## 2022-07-18 PROCEDURE — 85520 HEPARIN ASSAY: CPT | Performed by: INTERNAL MEDICINE

## 2022-07-18 PROCEDURE — 250N000013 HC RX MED GY IP 250 OP 250 PS 637

## 2022-07-18 PROCEDURE — 92526 ORAL FUNCTION THERAPY: CPT | Mod: GN

## 2022-07-18 PROCEDURE — 94640 AIRWAY INHALATION TREATMENT: CPT | Mod: 76

## 2022-07-18 PROCEDURE — 999N000157 HC STATISTIC RCP TIME EA 10 MIN

## 2022-07-18 PROCEDURE — 250N000013 HC RX MED GY IP 250 OP 250 PS 637: Performed by: THORACIC SURGERY (CARDIOTHORACIC VASCULAR SURGERY)

## 2022-07-18 PROCEDURE — 82803 BLOOD GASES ANY COMBINATION: CPT | Performed by: NURSE PRACTITIONER

## 2022-07-18 PROCEDURE — G1010 CDSM STANSON: HCPCS | Mod: GC | Performed by: RADIOLOGY

## 2022-07-18 PROCEDURE — 71045 X-RAY EXAM CHEST 1 VIEW: CPT

## 2022-07-18 PROCEDURE — 258N000003 HC RX IP 258 OP 636: Performed by: INTERNAL MEDICINE

## 2022-07-18 PROCEDURE — G0463 HOSPITAL OUTPT CLINIC VISIT: HCPCS

## 2022-07-18 PROCEDURE — 82248 BILIRUBIN DIRECT: CPT | Performed by: NURSE PRACTITIONER

## 2022-07-18 PROCEDURE — 99233 SBSQ HOSP IP/OBS HIGH 50: CPT | Mod: 24 | Performed by: NURSE PRACTITIONER

## 2022-07-18 PROCEDURE — 99233 SBSQ HOSP IP/OBS HIGH 50: CPT | Mod: 24 | Performed by: CLINICAL NURSE SPECIALIST

## 2022-07-18 PROCEDURE — 71250 CT THORAX DX C-: CPT | Mod: MG

## 2022-07-18 PROCEDURE — 83735 ASSAY OF MAGNESIUM: CPT | Performed by: SURGERY

## 2022-07-18 PROCEDURE — 250N000012 HC RX MED GY IP 250 OP 636 PS 637: Performed by: THORACIC SURGERY (CARDIOTHORACIC VASCULAR SURGERY)

## 2022-07-18 PROCEDURE — 36415 COLL VENOUS BLD VENIPUNCTURE: CPT

## 2022-07-18 PROCEDURE — 250N000009 HC RX 250: Performed by: SURGERY

## 2022-07-18 PROCEDURE — 84443 ASSAY THYROID STIM HORMONE: CPT | Performed by: STUDENT IN AN ORGANIZED HEALTH CARE EDUCATION/TRAINING PROGRAM

## 2022-07-18 PROCEDURE — 85027 COMPLETE CBC AUTOMATED: CPT

## 2022-07-18 PROCEDURE — 94640 AIRWAY INHALATION TREATMENT: CPT

## 2022-07-18 PROCEDURE — 250N000012 HC RX MED GY IP 250 OP 636 PS 637

## 2022-07-18 PROCEDURE — 84100 ASSAY OF PHOSPHORUS: CPT | Performed by: SURGERY

## 2022-07-18 PROCEDURE — 80197 ASSAY OF TACROLIMUS: CPT | Performed by: NURSE PRACTITIONER

## 2022-07-18 PROCEDURE — 80053 COMPREHEN METABOLIC PANEL: CPT | Performed by: NURSE PRACTITIONER

## 2022-07-18 PROCEDURE — 36415 COLL VENOUS BLD VENIPUNCTURE: CPT | Performed by: INTERNAL MEDICINE

## 2022-07-18 PROCEDURE — 250N000012 HC RX MED GY IP 250 OP 636 PS 637: Performed by: PHYSICIAN ASSISTANT

## 2022-07-18 PROCEDURE — 85730 THROMBOPLASTIN TIME PARTIAL: CPT

## 2022-07-18 PROCEDURE — 36415 COLL VENOUS BLD VENIPUNCTURE: CPT | Performed by: NURSE PRACTITIONER

## 2022-07-18 PROCEDURE — 99233 SBSQ HOSP IP/OBS HIGH 50: CPT | Mod: GC | Performed by: STUDENT IN AN ORGANIZED HEALTH CARE EDUCATION/TRAINING PROGRAM

## 2022-07-18 RX ORDER — DAPSONE 25 MG/1
50 TABLET ORAL
Status: DISCONTINUED | OUTPATIENT
Start: 2022-07-18 | End: 2022-08-05

## 2022-07-18 RX ORDER — FUROSEMIDE 10 MG/ML
120 INJECTION INTRAMUSCULAR; INTRAVENOUS ONCE
Status: COMPLETED | OUTPATIENT
Start: 2022-07-18 | End: 2022-07-18

## 2022-07-18 RX ORDER — HEPARIN SODIUM 10000 [USP'U]/100ML
0-5000 INJECTION, SOLUTION INTRAVENOUS CONTINUOUS
Status: CANCELLED | OUTPATIENT
Start: 2022-07-18

## 2022-07-18 RX ORDER — HEPARIN SODIUM 10000 [USP'U]/100ML
0-5000 INJECTION, SOLUTION INTRAVENOUS CONTINUOUS
Status: DISCONTINUED | OUTPATIENT
Start: 2022-07-18 | End: 2022-08-15

## 2022-07-18 RX ORDER — MAGNESIUM OXIDE 400 MG/1
400 TABLET ORAL DAILY
Status: DISCONTINUED | OUTPATIENT
Start: 2022-07-19 | End: 2022-07-20

## 2022-07-18 RX ADMIN — NYSTATIN: 100000 CREAM TOPICAL at 19:58

## 2022-07-18 RX ADMIN — Medication 5 MG: at 00:24

## 2022-07-18 RX ADMIN — NYSTATIN 1000000 UNITS: 100000 SUSPENSION ORAL at 12:14

## 2022-07-18 RX ADMIN — LEVALBUTEROL HYDROCHLORIDE 1.25 MG: 1.25 SOLUTION RESPIRATORY (INHALATION) at 12:59

## 2022-07-18 RX ADMIN — METOPROLOL TARTRATE 25 MG: 25 TABLET, FILM COATED ORAL at 19:58

## 2022-07-18 RX ADMIN — INSULIN ASPART 3 UNITS: 100 INJECTION, SOLUTION INTRAVENOUS; SUBCUTANEOUS at 05:38

## 2022-07-18 RX ADMIN — Medication 1 CAPSULE: at 19:58

## 2022-07-18 RX ADMIN — THERA TABS 1 TABLET: TAB at 12:14

## 2022-07-18 RX ADMIN — ACETYLCYSTEINE 2 ML: 200 SOLUTION ORAL; RESPIRATORY (INHALATION) at 08:40

## 2022-07-18 RX ADMIN — PREDNISONE 15 MG: 5 TABLET ORAL at 19:58

## 2022-07-18 RX ADMIN — Medication 40 MG: at 21:43

## 2022-07-18 RX ADMIN — Medication 1 PACKET: at 12:16

## 2022-07-18 RX ADMIN — INSULIN ASPART 1 UNITS: 100 INJECTION, SOLUTION INTRAVENOUS; SUBCUTANEOUS at 12:22

## 2022-07-18 RX ADMIN — LEVALBUTEROL HYDROCHLORIDE 1.25 MG: 1.25 SOLUTION RESPIRATORY (INHALATION) at 16:03

## 2022-07-18 RX ADMIN — ACETYLCYSTEINE 2 ML: 200 SOLUTION ORAL; RESPIRATORY (INHALATION) at 12:59

## 2022-07-18 RX ADMIN — Medication 5 MG: at 05:31

## 2022-07-18 RX ADMIN — ASPIRIN 81 MG CHEWABLE TABLET 81 MG: 81 TABLET CHEWABLE at 08:49

## 2022-07-18 RX ADMIN — SIMETHICONE 80 MG: 80 TABLET, CHEWABLE ORAL at 12:14

## 2022-07-18 RX ADMIN — Medication 1 CAPSULE: at 08:50

## 2022-07-18 RX ADMIN — SIMETHICONE 80 MG: 80 TABLET, CHEWABLE ORAL at 17:36

## 2022-07-18 RX ADMIN — HEPARIN SODIUM 5000 UNITS: 5000 INJECTION, SOLUTION INTRAVENOUS; SUBCUTANEOUS at 05:28

## 2022-07-18 RX ADMIN — TACROLIMUS 5 MG: 5 CAPSULE ORAL at 17:37

## 2022-07-18 RX ADMIN — MYCOPHENOLATE MOFETIL 1000 MG: 200 POWDER, FOR SUSPENSION ORAL at 19:59

## 2022-07-18 RX ADMIN — MIDODRINE HYDROCHLORIDE 5 MG: 5 TABLET ORAL at 00:09

## 2022-07-18 RX ADMIN — INSULIN ASPART 1 UNITS: 100 INJECTION, SOLUTION INTRAVENOUS; SUBCUTANEOUS at 09:02

## 2022-07-18 RX ADMIN — TACROLIMUS 5 MG: 5 CAPSULE ORAL at 08:52

## 2022-07-18 RX ADMIN — SIMETHICONE 80 MG: 80 TABLET, CHEWABLE ORAL at 08:50

## 2022-07-18 RX ADMIN — LEVALBUTEROL HYDROCHLORIDE 1.25 MG: 1.25 SOLUTION RESPIRATORY (INHALATION) at 08:40

## 2022-07-18 RX ADMIN — INSULIN ASPART 3 UNITS: 100 INJECTION, SOLUTION INTRAVENOUS; SUBCUTANEOUS at 17:36

## 2022-07-18 RX ADMIN — LEVALBUTEROL HYDROCHLORIDE 1.25 MG: 1.25 SOLUTION RESPIRATORY (INHALATION) at 19:24

## 2022-07-18 RX ADMIN — CALCIUM CARBONATE 600 MG (1,500 MG)-VITAMIN D3 400 UNIT TABLET 1 TABLET: at 08:49

## 2022-07-18 RX ADMIN — ONDANSETRON 4 MG: 4 TABLET, ORALLY DISINTEGRATING ORAL at 05:28

## 2022-07-18 RX ADMIN — VALGANCICLOVIR HYDROCHLORIDE 450 MG: 50 POWDER, FOR SOLUTION ORAL at 08:51

## 2022-07-18 RX ADMIN — DAPSONE 50 MG: 25 TABLET ORAL at 08:56

## 2022-07-18 RX ADMIN — PREDNISONE 15 MG: 5 TABLET ORAL at 08:49

## 2022-07-18 RX ADMIN — FUROSEMIDE 120 MG: 10 INJECTION, SOLUTION INTRAMUSCULAR; INTRAVENOUS at 10:57

## 2022-07-18 RX ADMIN — ACETYLCYSTEINE 2 ML: 200 SOLUTION ORAL; RESPIRATORY (INHALATION) at 16:03

## 2022-07-18 RX ADMIN — VANCOMYCIN HYDROCHLORIDE 125 MG: KIT at 12:17

## 2022-07-18 RX ADMIN — METOPROLOL TARTRATE 25 MG: 25 TABLET, FILM COATED ORAL at 08:50

## 2022-07-18 RX ADMIN — ONDANSETRON 4 MG: 2 INJECTION INTRAMUSCULAR; INTRAVENOUS at 09:04

## 2022-07-18 RX ADMIN — SODIUM CHLORIDE 250 ML: 9 INJECTION, SOLUTION INTRAVENOUS at 22:08

## 2022-07-18 RX ADMIN — INSULIN ASPART 2 UNITS: 100 INJECTION, SOLUTION INTRAVENOUS; SUBCUTANEOUS at 20:04

## 2022-07-18 RX ADMIN — Medication 2.5 MG: at 19:57

## 2022-07-18 RX ADMIN — ACETAMINOPHEN 975 MG: 325 TABLET, FILM COATED ORAL at 14:54

## 2022-07-18 RX ADMIN — NYSTATIN 1000000 UNITS: 100000 SUSPENSION ORAL at 20:49

## 2022-07-18 RX ADMIN — VANCOMYCIN HYDROCHLORIDE 125 MG: KIT at 05:27

## 2022-07-18 RX ADMIN — SIMETHICONE 80 MG: 80 TABLET, CHEWABLE ORAL at 19:58

## 2022-07-18 RX ADMIN — CALCIUM CARBONATE 600 MG (1,500 MG)-VITAMIN D3 400 UNIT TABLET 1 TABLET: at 17:36

## 2022-07-18 RX ADMIN — INSULIN GLARGINE 15 UNITS: 100 INJECTION, SOLUTION SUBCUTANEOUS at 08:54

## 2022-07-18 RX ADMIN — ACETAMINOPHEN 975 MG: 325 TABLET, FILM COATED ORAL at 19:58

## 2022-07-18 RX ADMIN — Medication 40 MG: at 08:50

## 2022-07-18 RX ADMIN — MIDODRINE HYDROCHLORIDE 5 MG: 5 TABLET ORAL at 21:43

## 2022-07-18 RX ADMIN — THIAMINE HCL TAB 100 MG 100 MG: 100 TAB at 08:50

## 2022-07-18 RX ADMIN — MIDODRINE HYDROCHLORIDE 5 MG: 5 TABLET ORAL at 08:50

## 2022-07-18 RX ADMIN — ACETAMINOPHEN 975 MG: 325 TABLET, FILM COATED ORAL at 08:50

## 2022-07-18 RX ADMIN — Medication 5 MG: at 12:35

## 2022-07-18 RX ADMIN — INSULIN GLARGINE 15 UNITS: 100 INJECTION, SOLUTION SUBCUTANEOUS at 20:04

## 2022-07-18 RX ADMIN — NYSTATIN 1000000 UNITS: 100000 SUSPENSION ORAL at 08:49

## 2022-07-18 RX ADMIN — MYCOPHENOLATE MOFETIL 1000 MG: 200 POWDER, FOR SUSPENSION ORAL at 08:51

## 2022-07-18 RX ADMIN — VANCOMYCIN HYDROCHLORIDE 125 MG: KIT at 20:49

## 2022-07-18 RX ADMIN — NYSTATIN 1000000 UNITS: 100000 SUSPENSION ORAL at 17:37

## 2022-07-18 RX ADMIN — ACETYLCYSTEINE 2 ML: 200 SOLUTION ORAL; RESPIRATORY (INHALATION) at 19:24

## 2022-07-18 ASSESSMENT — ACTIVITIES OF DAILY LIVING (ADL)
ADLS_ACUITY_SCORE: 42
ADLS_ACUITY_SCORE: 38
ADLS_ACUITY_SCORE: 42
ADLS_ACUITY_SCORE: 38
ADLS_ACUITY_SCORE: 38
ADLS_ACUITY_SCORE: 42
ADLS_ACUITY_SCORE: 38

## 2022-07-18 NOTE — PROGRESS NOTES
"  Cardiovascular Surgery Progress Note  07/18/2022         Assessment and Plan:     Sofie is a 62 F PMH of oxygen-dependent COPD, HFpEF, HTN, HCV, and osteopenia who underwent bilateral lung transplant on 6/28/22 with Dr. Sunshine. This was complicated by left upper extremity acute limb ischemia s/p left radial thrombectomy on 7/1/22. Recovering renal function, will hold off on tunneled dialysis line at this time.       - Right Pleural tube remains to suction, elevated output, can ambulate off suction.   - Left pleural tube remains to suction for elevated output. Left tube site needs Vaseline gauze gauze wrapped around tube at the skin to avoid air leaking into chest. Can ambulate off suction.   - Bilateral chest tubes stripped 7/17, small amount of serous output at that time.  Stripped again 7/18 without immediate return of pleural fluid  - Removed pericardial tube 7/15 without immediate complication.      Discussed with Dr Rivas.    Andres Wilson PA-C  Cardiothoracic Surgery  Pager [phone]    1:52 PM   July 18, 2022          Interval History:     No overnight events.  Left pleural tube drained at skin.   States pain is sometimes unmanageable and refusing PT/OT at times.          Physical Exam:   Blood pressure 135/65, pulse 83, temperature 98  F (36.7  C), temperature source Oral, resp. rate 10, height 1.575 m (5' 2\"), weight 71.6 kg (157 lb 13.6 oz), SpO2 100 %, not currently breastfeeding.  Vitals:    07/16/22 0000 07/17/22 0000 07/18/22 0531   Weight: 71.4 kg (157 lb 6.5 oz) 71.6 kg (157 lb 13.6 oz) 71.6 kg (157 lb 13.6 oz)      Gen: A&Ox4, NAD  Neuro: no focal deficits   Abd: distended  Incision: clean, dry, intact, no erythema, sternum stable  Tubes/drain sites: dressing clean and dry, serosanguinous output, no air leak. 24 hr output 300 mL.          Data:    Imaging:  reviewed recent imaging, no acute concerns    Labs:  BMP  Recent Labs   Lab 07/18/22  1221 07/18/22  0901 07/18/22  0537 " 07/18/22  0532 07/17/22  0929 07/17/22  0636 07/16/22  0843 07/16/22  0531 07/15/22  0809 07/15/22  0420   NA  --   --   --  131*  --  127*  --  133*  --  135*   POTASSIUM  --   --   --  4.3  --  3.5  --  4.8  --  4.6   CHLORIDE  --   --   --  94*  --  90*  --  97*  --  93*   ESTUARDO  --   --   --  8.6*  --  8.4*  --  8.7*  --  8.7*   CO2  --   --   --  31*  --  27  --  31*  --  34*   BUN  --   --   --  59.5*  --  41.1*  --  66.2*  --  123.0*   CR  --   --   --  2.04*  --  1.62*  --  1.96*  --  2.65*   * 164* 199* 202*   < > 148*   < > 144*   < > 245*    < > = values in this interval not displayed.     CBC  Recent Labs   Lab 07/18/22  0532 07/17/22  0636 07/16/22  0531 07/15/22  0422   WBC 10.5 10.2 10.1 9.8   RBC 2.79* 2.78* 2.83* 2.72*   HGB 8.7* 8.6* 8.7* 8.3*   HCT 27.4* 27.3* 28.2* 27.6*   MCV 98 98 100 102*   MCH 31.2 30.9 30.7 30.5   MCHC 31.8 31.5 30.9* 30.1*   RDW 16.1* 15.6* 15.9* 16.5*    213 229 262

## 2022-07-18 NOTE — PROGRESS NOTES
Nephrology Progress Note  07/18/2022         Sofie Rodriguez is a 62 yom with hx of HTN, Hep C, osetopenia, previous polysubstance use (stopped 2019) and severe COPD who is s/p BSLT on 6/28/2022.  Had ~2.5h of bypass time, was uncomplicated but now has post op BACILIO in setting of continued need for vasopressors thought to be due to vasoplegia.  Had radial artery thrombus requiring thrombectomy on 7/2.  Nephrology consulted for BACILIO with mild hyperkalemia and oliguric UOP.       Interval History :   Mrs Higgins was initiated on HD 7/14, had 3 runs and now yesterday off of HD.  Cr up but no indication to run HD today.  Will give lasix to augment UOP, not well quantified since pulling montgomery.  She was mildly SOB on our visit but CXR is a bit improved overall, likely will need HD tomorrow but will see I/O balance and chemistries.       Assessment & Recommendations:   BACILIO-Normal baseline renal fx historically and at time of surgery.  No renal imaging but low suspicion of obstruction so will consider if she does not show improvement.  BACILIO is likely hypoperfusion with ~2.5h of bypass time and tacrolimus although levels have been within range (needing higher doses of tacro).   Will manage this medically for now and continue to monitor.  UA initially showed hyaline casts on 6/28..  Noted that she did not get contrast for thrombectomy on 7/2.  Cr had started to improve but now again on the rise prompting starting RRT on 7/14.                -BACILIO, likely hypoperfusion and need for tacro, also now with supratherapeutic vanco.   With lack of improvement and ongoing need for tacro with up and down levels we started dialysis on 7/14 while still watching for recovery.                 -Holding on run today, likely run tomorrow depending on chemistries.                 -Dialysis consent is signed and scanned in under media 7/14.                -Continue to avoid nephrotoxins, norepi weaned off, would keep SBP >100 as able, hypotensive due  "to vasoplegia.       Volume-Dnog removed so UOP is less clear but is still making some UOP, giving 120mg lasix today to try to augment.       Electrolytes-K 4.3, bicarb 31, VBG 7.32/62/33/32.  Hypercapnia thought to be related to longstanding COPD and should get better with time after transplant.       BMD-Ca 8.6, iCal WNL throughout, will follow.  Mg and Phos mildly up consistent with BACILIO.       Lung Tx-On Tacro, levels have been within range, last level 7.9.  Has hypercapnia despite transplant and reasonable CXR/oxygenation, team expects this to improve with time.       Anemia-Hgb 6.8, acute management per team.       Nutrition-Renal TF     Time spent: 30 minutes on this date of encounter for chart review, physical exam, medical decision making and co-ordination of care.      Seen and discussed with Dr Pimentel     Recommendations were communicated to primary team via verbal communication.       RUFUS Cedeño CNS  Clinical Nurse Specialist  [phone]    Review of Systems:   I reviewed the following systems:  Gen: No fevers or chills  CV: No CP at rest  Resp: Mild SOB at rest  GI: No N/V    Physical Exam:   I/O last 3 completed shifts:  In: 1815 [I.V.:240; NG/GT:485; IV Piggyback:250]  Out: 1250 [Urine:250; Emesis/NG output:710; Chest Tube:290]   /52 (BP Location: Right leg)   Pulse 90   Temp 98.2  F (36.8  C) (Axillary)   Resp 10   Ht 1.575 m (5' 2\")   Wt 71.6 kg (157 lb 13.6 oz)   SpO2 100%   BMI 28.87 kg/m       GENERAL APPEARANCE: On NC, in no distress.    EYES: No scleral icterus  NECK:  No JVD  Pulmonary: lungs clear to auscultation with equal breath sounds bilaterally, no clubbing or cyanosis  CV: Regular rhythm, normal rate, no rub   - Edema+2 generalized.   GI: soft, nontender, normal bowel sounds  MS: no evidence of inflammation in joints, no muscle tenderness  : + Dong  SKIN: no rash, warm, dry  NEURO: Answering questions appropriately, no focal deficits.       Labs:   All labs " reviewed by me  Electrolytes/Renal - Recent Labs   Lab Test 07/18/22  0901 07/18/22  0537 07/18/22  0532 07/17/22  0929 07/17/22  0636 07/16/22  0843 07/16/22  0531   NA  --   --  131*  --  127*  --  133*   POTASSIUM  --   --  4.3  --  3.5  --  4.8   CHLORIDE  --   --  94*  --  90*  --  97*   CO2  --   --  31*  --  27  --  31*   BUN  --   --  59.5*  --  41.1*  --  66.2*   CR  --   --  2.04*  --  1.62*  --  1.96*   * 199* 202*   < > 148*   < > 144*   ESTUARDO  --   --  8.6*  --  8.4*  --  8.7*   MAG  --   --  2.8*  --  1.9  --  2.3   PHOS  --   --  5.7*  --  4.1  --  4.6*    < > = values in this interval not displayed.       CBC -   Recent Labs   Lab Test 07/18/22  0532 07/17/22  0636 07/16/22  0531   WBC 10.5 10.2 10.1   HGB 8.7* 8.6* 8.7*    213 229       LFTs -   Recent Labs   Lab Test 07/18/22  0532 07/15/22  0420 07/14/22  0459   ALKPHOS 85 74 64   BILITOTAL 0.2 0.2 0.2   ALT 21 32 22   AST 19 31 22   PROTTOTAL 4.7* 4.9* 4.6*   ALBUMIN 2.8* 2.9* 2.6*       Iron Panel - No lab results found.        Current Medications:    acetaminophen  975 mg Oral or Feeding Tube TID     acetylcysteine  2 mL Nebulization 4x Daily     aspirin  81 mg Oral Daily     calcium carbonate 600 mg-vitamin D 400 units  1 tablet Oral BID w/meals     ceFAZolin  2 g Intravenous Pre-Op/Pre-procedure x 1 dose     dapsone  50 mg Oral or Feeding Tube Once per day on Mon Wed Fri     heparin ANTICOAGULANT  5,000 Units Subcutaneous Q8H [facility]     insulin aspart  1-12 Units Subcutaneous Q4H     insulin glargine  15 Units Subcutaneous BID     lactobacillus rhamnosus (GG)  1 capsule Oral BID     levalbuterol  1.25 mg Nebulization 4x Daily     [START ON 7/19/2022] magnesium oxide  400 mg Oral Daily     metoprolol tartrate  25 mg Oral BID     midodrine  5 mg Oral or Feeding Tube Q8H     multivitamin, therapeutic  1 tablet Per Feeding Tube Daily     mycophenolate  1,000 mg Oral or Feeding Tube BID     nystatin   Topical BID     nystatin  1,000,000  Units Swish & Swallow 4x Daily     ondansetron  4 mg Oral Daily     pantoprazole  40 mg Oral or Feeding Tube BID     predniSONE  15 mg Per Feeding Tube BID     protein modular  1 packet Per Feeding Tube Daily     simethicone  80 mg Oral 4x Daily     sodium chloride (PF)  3 mL Intracatheter Q8H     sodium chloride (PF)  3 mL Intracatheter Q8H     tacrolimus  5 mg Per Feeding Tube BID IS     thiamine  100 mg Oral or Feeding Tube Daily     valGANciclovir  450 mg Oral or NG Tube Once per day on Mon Thu     vancomycin  125 mg Oral or Feeding Tube Q6H [facility]       dextrose Stopped (07/15/22 2408)

## 2022-07-18 NOTE — PROGRESS NOTES
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 Sunshine. This was complicated by left upper extremity acute limb ischemia s/p left radial thrombectomy on 7/1/22. Recovering renal function, will hold off on tunneled dialysis line at this time.      Areas Assessed:      Areas visualized during today's visit: Face and neck    Pressure Injury Location: R nare and septum     Last photo:     Wound type: Pressure Injury     Pressure Injury Stage: 2, hospital acquired      This is a Medical Device Related Pressure Injury (MDRPI) due to oxygen tubing and NG    Wound base:100% epithelium     Palpation of the wound bed: normal      Drainage: scant     Description of drainage: serous     Measurements (length x width x depth, in cm) resurfaced 7/18     Tunneling N/A     Undermining N/A  Periwound skin: Intact      Color: pink      Temperature: normal   Odor: none  Pain: mild, tender  Pain intervention prior to dressing change: patient tolerated well  Treatment goal: Protection  STATUS: resurfaced   Supplies ordered: supplies stored on unit     Wound has resolved, noted that pt also now has an NGT as well as a feeding tube. Monitor closely for further nare pressure injuries.        Treatment Plan:     R nare wound(s): Daily  cleanse with saline and dry, then apply a very thin layer of vaseline over wound bed and leave URI. Switch between nasal cannula and face mask for oxygen delivery to help offload area.      Orders: Written    RECOMMEND PRIMARY TEAM ORDER: None, at this time  Education provided: plan of care  Discussed plan of care with: Patient  WOC nurse follow-up plan: signing off  Notify WOC if wound(s) deteriorate.  Nursing to notify the Provider(s) and re-consult the  WOC Nurse if new skin concern.    DATA:     Current support surface: Standard  Low air loss mattress  Containment of urine/stool: Indwelling catheter  BMI: Body mass index is 28.87 kg/m .   Active diet order: Orders Placed This Encounter      Regular Diet Adult      NPO per Anesthesia Guidelines for Procedure/Surgery Except for: No Exceptions      NPO per Anesthesia Guidelines for Procedure/Surgery Except for: Meds     Output: I/O last 3 completed shifts:  In: 1815 [I.V.:240; NG/GT:485; IV Piggyback:250]  Out: 1250 [Urine:250; Emesis/NG output:710; Chest Tube:290]     Labs:   Recent Labs   Lab 07/18/22  0532   ALBUMIN 2.8*   HGB 8.7*   WBC 10.5     Pressure injury risk assessment:   Sensory Perception: 3-->slightly limited  Moisture: 3-->occasionally moist  Activity: 2-->chairfast  Mobility: 3-->slightly limited  Nutrition: 2-->probably inadequate  Friction and Shear: 2-->potential problem  Jose Alejandro Score: 15      Dept. Pager: [phone]  Dept. Office Number: [phone]

## 2022-07-18 NOTE — PLAN OF CARE
Goal Outcome Evaluation:    Plan of Care Reviewed With: patient     Overall Patient Progress: improving    Outcome Evaluation: strength improved. Continues to have issues  with abdominal pain    ICU End of Shift Summary. See flowsheets for vital signs and detailed assessment.    Changes this shift: Up to chair for several hours. Able to stand during transition for several minutes with assistance. Sinus rhythm for majority of day. One episode of SVT in evening that lasted for approximately 10 minutes. Blood pressure remained stable during episode where max heart rate was 160. Vagal exercises used successfully to bring patient out of SVT. Continue to require 1 lpm. Nasal canula and oxymask alternated to reduce pressure on nasal injury. Multiple loose stools. 120 lasix challenge with minimal output. Ct scan due to fluid in upper portion of lung/chest on xray. Heparin drip started.     Plan: Up to chair and walking as tolerated. NPO at midnight for gastric emptying study at 0830. Heparin off at 0830 bronchoscopy at 1230

## 2022-07-18 NOTE — PLAN OF CARE
ICU End of Shift Summary. See flowsheets for vital signs and detailed assessment.    Changes this shift: 2 loose/soft bowel movements. Denied nausea overnight. SR to ST, no SVT. Purewick. Pain managed w/ atarax, oxy, and robaxin. L chest tube dressing changed.     Plan:  Plan to test gastric emptying. BP cuff on leg due to difficulty gaining peripheral access.    Goal Outcome Evaluation:    Plan of Care Reviewed With: patient     Overall Patient Progress: no change    Outcome Evaluation: Abdomen pain lessened w/ oxy, SR all night

## 2022-07-18 NOTE — PROGRESS NOTES
Pulmonary Medicine  Cystic Fibrosis - Lung Transplant Team  Progress Note  2022       Patient: Sofie Rodriguez  MRN: [id number]  : 1960 (age 62 year old)  Transplant: 2022 (Lung), POD#20  Admission date: 2022    Assessment & Plan:     Sofie Rodriguez is a 62 year old female with a PMH significant for end-stage COPD, HTN, HFpEF, Mycobacterium peregrinum colonization, h/o hepatitis C, HECTOR s/p LEEP procedure, osteopenia, and former methamphetamine use.  Pt. is now s/p BSLT on 22, lungs slightly undersized.   Persistent low dose pressor needs post-op through 7/10.  Extubated POD #2 but with persistent hypercapnia, mostly compensated and only slightly improved with intermittent NIPPV.  Also with left radial artery thrombus (presumed secondary to arterial line) s/p thrombectomy /, BACILIO, and C diff.  Rehabilitation and airway clearance limited by dysrhythmia and gastric discomfort.     Today's recommendations:  - CPT QID, encourage consistent participation d/t concern for ineffective airway clearance  - Wean O2 as able to keep >92%, NIPPV overnight given persistent hypercapnia  - Recommend to strip chest tubes qshift, management per CVTS  - Daily 2-view CXR  - Repeat inspection bronch tomorrow at 1230, strict NPO (including TF and medications) for 6 hours prior (nursing to give all AM medications by 0630 on )  - Aggressive volume removal as able  - Encourage regular activity including up in chair (minimal day time in bed)  - Tacrolimus level today therapeutic, no dose adjustemnt, repeat level ordered   - Prednisone taper next due   - Dapsone for PJP ppx will start today at reduced dose of 50 mg qMWF and increase to daily if able  - Heparin gtt to be held 4 hours prior to bronch  - Gastric emptying study tomorrow (after bronch)  - NG to LIS, clamping trial daily to assess for readiness for removal, defer today  - Space out medications to minimize doses due at 0800 (discussed with  pharmacy again today)  - Mag oxide daily (ordered for you)  - DSA, EBV, IgG, and donor risk labs ordered 7/28     S/p bilateral sequential lung transplant (BSLT) for end stage COPD:  Persistent hypercapneic respiratory failure:   Persistent hypotension:   Bilateral hydroPTX:  Right hemidiaphragm palsy: Explant pathology with severe emphysema with subpleural bullae formation, changes of chronic bronchitis, subpleural scars and patchy pulmonary edema; benign hilar lymph nodes.  No evidence of PGD post-op.  Extubated 6/30.  Persistent hypercapnia without dyspnea, appears to be a respiratory drive problem.  Some improvement with BiPAP, limited tolerance in part due to anxiety.  Persistent low dose pressors weaned off 7/10, on scheduled midodrine (also s/p hydrocortisone 7/6-7/9 and fludrocortisone 7/6-7/11).  Bronch (7/12) given CXR changes with small amount of granulation tissue posteriorly in right anastomosis (no stenosis or dehiscence), thick brown secretions in proximal airways, and right mainstem/RUL mucous plug (removed).  CT chest (7/14) with stable biapical PTX and stable dependent bilateral pleural effusions.  SNIFF (7/14) notable for right hemidiaphragm palsy.  On 1L NC with BiPAP overnight (improvement in hypercapnea).  - DSA one month post-transplant (7/28, ordered)  - Ammonia monitoring twice weekly (screening for hyperammonemia post-lung transplant)  - Nebs: levalbuterol and Mucomyst QID   - Pulmonary toilet with chest physiotherapy QID, encourage consistent participation d/t concern for ineffective airway clearance (inconsistent/infrequent use post-op)  - Aerobika and incentive spirometry hourly while awake  - Supplemental oxygen as needed to maintain SpO2 >92% (wean as able), NIPPV overnight given persistent hypercapnia  - Chest tubes managed by surgical team, recommend to strip chest tubes qshift  - Daily 2-view CXR, today with grossly stable to slightly improved diffuse perihilar and bibasilar hazy  opacities (personally reviewed)  - Repeat inspection bronch tomorrow at 1230, strict NPO (including TF and medications) for 6 hours prior (nursing to give all AM medications by 0630 on 7/19)  - Aggressive volume removal as able per nephrology  - Encourage regular activity including up in chair (minimal day time in bed) and active participation in PT/OT given concern for deconditioning and ineffective airway clearance     Immunosuppression:  Induction therapy with basiliximab (and high dose IV steroid) given intraoperatively, repeating basiliximab dose on POD#4.  - Tacrolimus 5 mg BID (via NJ tube, held 7/11-7/12 for supratherapeutic level, peak level appropriate 7/11).  Goal level 8-12.  Level today 11.9 (11h), no dose adjustment.  Repeat level 7/21 (ordered).  - MMF 1000 mg BID (decreased 7/10 due to diarrhea)  - Prednisone 15 mg BID, next taper due 7/21 (not yet ordered)  Date AM dose (mg) PM dose (mg)   7/14/22 15 15   7/21/22 15 12.5   7/28/22 12.5 12.5   8/4/22 12.5 10   8/18/22 10 10   9/15/22 10 7.5   10/13/22 7.5 7.5   11/10/22 7.5 5   12/8/22 5 5   1/5/23 5 2.5      Prophylaxis:   - Dapsone for PJP ppx deferred pending stable hgb, will start today at reduced dose of 50 mg qMWF and increase to daily if able (Bactrim held 7/7 for BACILIO)  - VGCV for CMV ppx  - Nystatin for oral candidiasis ppx, 6 month course  - See below for serologies and viral ppx:    Donor Recipient Intervention   CMV status Positive Positive Valganciclovir POD #8-90   EBV status Positive Positive EBV check monthly (7/28, ordered)   HSV status N/A Positive Not indicated      ID:  H/o M. peregrinum colonization: Donor bronch cultures (OSH) with Strep beta hemolytic (not group A).  Recipient cultures as below.  - IgG at one month (7/28, ordered)  - Bronch cultures (7/12) NGTD, follow  - AFB bronch culture (6/28, 6/29) NGTD, AFB to be sent on all future bronchs     Streptococcus pneumoniae:  Stenotrophomonas maltophilia: Noted in recipient  cultures at time of transplant.  S/p ceftazidime 6/28-7/10, vancomycin 7/7-7/8 and 6/28-6/30, and levofloxacin 7/10-7/12 for total 2 week ABX course.     H/o hepatitis C: Diagnosed in 1980s, 2 mos of treatment, quant negative since 10/2017, last positive 2/20/17 ([phone]).  Glenis positive on 6/2021 with negative HCV PCR.  H/o remote EtOH abuse.  MR elastography (4/27/21) with hepatology review and consult without any concerns post transplant.  Hepatitis C RNA negative and hepatitis C antibody positive (old) on 6/28.     PHS risk criteria donor:  Additional labs required post-transplant (between 4-8 weeks post-op): Hepatitis B, Hepatitis C, and HIV by SHERI ([id number], ordered 7/28).     Other relevant problems managed by primary team:      SVT:   Aflutter with RVR: SVT first noted on 7/14, prior to HD line placement.  Continues intermittently.  Aflutter with RVR to 200 7/17 triggered by activity.  EP consulted 7/17 given persistent tachycardia/dysrhythmia.  - Metoprolol per primary team (started 7/15)  - Continue to wean midodrine as able  - Heparin gtt (7/17) and then transition to DOAC when able     Left hand ischemia: Radial artery thrombosis identified on duplex doppler.  S/p thrombectomy on 7/2.  Completed high intensity heparin course.  Continue daily aspirin.      BACILIO:   Hyperkalemia: Likely multifactorial including medications (Bactrim, tacrolimus) and hypotension.  Fludrocortisone 7/6-7/11.  Potassium now stable.  S/p non-tunneled HD line 7/14.  Initial iHD run 7/14 limited by afib with RVR vs SVT.  - Tacrolimus monitoring as above  - Volume management per nephrology and ICU team     Abdominal pain: Noted 7/15, cramping pain.  ACR with large stool burden in colon, no obstruction or distention.  Some nausea but no emesis.  CT abdomen (7/15) with moderate to large gastric distention, otherwise without obstruction.  NG placed for LIS with moderate to large output.  Increased abdominal pain 7/17 after clamping for 4  hours.  - Gastric emptying study tomorrow AFTER bronch  - Schedule simethicone   - NG to LIS, clamping trials daily to assess for readiness for removal, defer today  - Space out medications to minimize doses due at 0800 (discussed with pharmacy again today)  - Additional management per primary     C diff infection: Abdominal pain with diarrhea noted 7/8, AXR without dilated bowel, moderate colonic stool burden.  C diff positive 7/11.    - PO vancomycin (7/11) per ICU team    Hypomagnesemia: Suppressed absorption d/t CNI.   - Mag oxide 400 mg daily (ordered for you)  - Continue daily magnesium with additional replacement protocol prn     We appreciate the excellent care provided by the [place] [id number] team.  Recommendations communicated via telephone and this note.  Will continue to follow along closely, please do not hesitate to call with any questions or concerns.     Patient discussed with Dr. Castillo.    Catherine Johnson, DNP, APRN, CNP  Inpatient Nurse Practitioner  Pulmonary CF/Transplant     Subjective & Interval History:     Continues on minimal supplemental oxygen during the day with BiPAP overnight, VBG stable/improved.  Weak cough, occasionally productive, improved participation in airway clearance yesterday.  Abdominal pain returned yesterday after clamping G tube for 4 hours, output remains moderate to large.  C/o return of pain this morning with medication administration via NJ tube.    Review of Systems:     C: No fever, no chills, no change in weight, no change in appetite  INTEGUMENTARY/SKIN: No rash or obvious new lesions  ENT/MOUTH: No sore throat, no sinus pain, no nasal congestion or drainage  RESP: See interval history  CV: No chest pain, no palpitations, no peripheral edema, no orthopnea  GI: + occ nausea, no vomiting, + loose stools, no reflux symptoms  : No dysuria  MUSCULOSKELETAL: No myalgias, no arthralgias  ENDOCRINE: Blood sugars with adequate control  NEURO: No headache, no numbness or  "tingling  PSYCHIATRIC: Mood stable    Physical Exam:     All notes, images, and labs from past 24 hours (at minimum) were reviewed.    Vital signs:  Temp: 97.4  F (36.3  C) Temp src: Oral BP: 119/67 Pulse: 80   Resp: 18 SpO2: 100 % O2 Device: BiPAP/CPAP Oxygen Delivery: 1 LPM Height: 157.5 cm (5' 2\") Weight: 71.6 kg (157 lb 13.6 oz)  I/O:     Intake/Output Summary (Last 24 hours) at 7/18/2022 0744  Last data filed at 7/18/2022 0600  Gross per 24 hour   Intake 1780 ml   Output 1250 ml   Net 530 ml     Constitutional: Lying in bed, in no apparent distress.   HEENT: Eyes with pink conjunctivae, anicteric.  Oral mucosa moist without lesions.  NJ tube to right nare, NG to left nare.  PULM: Diminished bases bilaterally.  No crackles, no rhonchi, no wheezes.  Non-labored breathing on 2L NC.  CV: Normal S1 and S2.  RRR.  No murmur, gallop, or rub.  No peripheral edema.   ABD: NABS, soft, nontender, nondistended.    MSK: Moves all extremities.    NEURO: Alert, conversant.   SKIN: Warm, dry.  No rash on limited exam.  Clamshell incision healing, approximated.  PSYCH: Mood stable.     Lines, Drains, and Devices:  Peripheral IV 07/14/22 Anterior;Right Lower forearm (Active)   Site Assessment WDL 07/18/22 0400   Line Status Saline locked 07/18/22 0400   Phlebitis Scale 0-->no symptoms 07/18/22 0400   Infiltration Scale 0 07/18/22 0400   Infiltration Site Treatment Method  None 07/18/22 0400   Number of days: 4       Peripheral IV 07/17/22 Anterior;Left Upper forearm (Active)   Site Assessment WDL 07/18/22 0400   Line Status Saline locked 07/18/22 0400   Phlebitis Scale 0-->no symptoms 07/18/22 0400   Infiltration Scale 0 07/18/22 0400   Number of days: 1       CVC Double Lumen Right Internal jugular Non - tunneled (Active)   Site Assessment WDL 07/18/22 0400   Dressing Type Chlorhexidine disk;Transparent 07/18/22 0400   Dressing Status clean;dry;intact 07/18/22 0400   Dressing Change Due 07/24/22 07/18/22 0400   Tubing Change " primary tubing 07/15/22 1654   Line Necessity yes, meets criteria 07/18/22 0000   Blue - Status saline locked 07/17/22 1600   Blue - Cap Change Due 07/22/22 07/18/22 0400   Red - Status saline locked 07/17/22 1600   Red - Cap Change Due 07/22/22 07/18/22 0400   Phlebitis Scale 0-->no symptoms 07/14/22 1200   Infiltration? no 07/17/22 1600   Infiltration Scale 0 07/17/22 1600   Infiltration Site Treatment Method  None 07/17/22 0400   Was a vesicant infusing? no 07/16/22 0400   CVC Comment HD line 07/18/22 0400   Number of days: 4     Data:     LABS    CMP:   Recent Labs   Lab 07/18/22  0537 07/18/22  0532 07/17/22  2328 07/17/22  2041 07/17/22  0929 07/17/22  0636 07/16/22  0843 07/16/22  0531 07/15/22  0809 07/15/22  0420 07/14/22  0752 07/14/22  0459   NA  --  131*  --   --   --  127*  --  133*  --  135*   < > 140   POTASSIUM  --  4.3  --   --   --  3.5  --  4.8  --  4.6   < > 4.3   CHLORIDE  --  94*  --   --   --  90*  --  97*  --  93*   < > 92*   CO2  --  31*  --   --   --  27  --  31*  --  34*   < > 42*   ANIONGAP  --  6*  --   --   --  10  --  5*  --  8   < > 6*   * 202* 164* 193*   < > 148*   < > 144*   < > 245*   < > 200*  193*   BUN  --  59.5*  --   --   --  41.1*  --  66.2*  --  123.0*   < > 155.0*   CR  --  2.04*  --   --   --  1.62*  --  1.96*  --  2.65*   < > 3.00*   GFRESTIMATED  --  27*  --   --   --  36*  --  29*  --  20*   < > 17*   ESTUARDO  --  8.6*  --   --   --  8.4*  --  8.7*  --  8.7*   < > 8.6*   MAG  --  2.8*  --   --   --  1.9  --  2.3  --  2.7*   < > 3.1*   PHOS  --  5.7*  --   --   --  4.1  --  4.6*  --  5.2*  --  5.1*   PROTTOTAL  --  4.7*  --   --   --   --   --   --   --  4.9*  --  4.6*   ALBUMIN  --  2.8*  --   --   --   --   --   --   --  2.9*  --  2.6*   BILITOTAL  --  0.2  --   --   --   --   --   --   --  0.2  --  0.2   ALKPHOS  --  85  --   --   --   --   --   --   --  74  --  64   AST  --  19  --   --   --   --   --   --   --  31  --  22   ALT  --  21  --   --   --   --   --    --   --  32  --  22    < > = values in this interval not displayed.     CBC:   Recent Labs   Lab 07/18/22  0532 07/17/22  0636 07/16/22  0531 07/15/22  0422   WBC 10.5 10.2 10.1 9.8   RBC 2.79* 2.78* 2.83* 2.72*   HGB 8.7* 8.6* 8.7* 8.3*   HCT 27.4* 27.3* 28.2* 27.6*   MCV 98 98 100 102*   MCH 31.2 30.9 30.7 30.5   MCHC 31.8 31.5 30.9* 30.1*   RDW 16.1* 15.6* 15.9* 16.5*    213 229 262       INR: No lab results found in last 7 days.    Glucose:   Recent Labs   Lab 07/18/22  0537 07/18/22  0532 07/17/22  2328 07/17/22  2041 07/17/22  1600 07/17/22  1208   * 202* 164* 193* 223* 162*       Blood Gas:   Recent Labs   Lab 07/18/22  0532 07/17/22  0636 07/16/22  0531   PHV 7.32 7.35 7.29*   PCO2V 62* 60* 73*   PO2V 33 37 37   HCO3V 32* 33* 35*   LINDY 5.2* 6.2* 6.5*   O2PER 30 13 30       Culture Data No results for input(s): CULT in the last 168 hours.    Virology Data:   Lab Results   Component Value Date    FLUAH1 Not Detected 06/29/2022    FLUAH3 Not Detected 06/29/2022    CX3679 Not Detected 06/29/2022    IFLUB Not Detected 06/29/2022    RSVA Not Detected 06/29/2022    RSVB Not Detected 06/29/2022    PIV1 Not Detected 06/29/2022    PIV2 Not Detected 06/29/2022    PIV3 Not Detected 06/29/2022    HMPV Not Detected 06/29/2022       Historical CMV results (last 3 of prior testing):  No results found for: CMVQNT  No results found for: CMVLOG    Urine Studies    Recent Labs   Lab Test 07/08/22  0831 07/05/22  1004   URINEPH 5.5 5.5   NITRITE Negative Negative   LEUKEST Negative Negative   WBCU 1 5       Most Recent [facility] Pulmonary Function Testing (FVC/FEV1 only)  FVC-Pre   Date Value Ref Range Status   04/29/2022 1.82 L    11/11/2021 2.17 L    06/14/2021 2.00 L      FVC-%Pred-Pre   Date Value Ref Range Status   04/29/2022 58 %    11/11/2021 70 %    06/14/2021 64 %      FEV1-Pre   Date Value Ref Range Status   04/29/2022 0.51 L    11/11/2021 0.53 L    06/14/2021 0.54 L      FEV1-%Pred-Pre   Date Value Ref  Range Status   04/29/2022 20 %    11/11/2021 21 %    06/14/2021 21 %        IMAGING    Recent Results (from the past 48 hour(s))   XR Chest 2 Views    Narrative    EXAM: XR CHEST 2 VW  7/16/2022 8:21 AM      HISTORY: Interval f/u s/p transplant    COMPARISON: 7/15/2022    FINDINGS: Two views of the chest. Postoperative changes of bilateral  lung transplantation. Stable bibasilar chest tubes. Right IJ central  venous catheter tip in the mid SVC. Feeding tube and enteric tube are  subdiaphragmatic. Interval removal of mediastinal drain.    Trachea is midline. Stable size of the cardiomediastinal silhouette.  No focal pulmonary opacity. Decreased pleural fluid in the left base.  Stable small amount of biapical pleural fluid/pleural thickening.  Slightly prominent interstitial markings. Perihilar and bibasilar  atelectasis. Small left apical pneumothorax. No appreciable  right-sided pneumothorax.      Impression    IMPRESSION:    1. Postoperative chest with interval removal of mediastinal drain in  stable position of bibasilar chest tubes. Remaining tubes and lines as  described above.  2. Decreased left basilar effusion.  3. Stable small left apical pneumothorax.  4. Mild pulmonary edema and atelectasis.    I have personally reviewed the examination and initial interpretation  and I agree with the findings.    YANNICK BENAVIDEZ MD         SYSTEM ID:  [id number]   XR Abdomen Port 1 View    Narrative    Exam: XR ABDOMEN PORT 1 VIEWS, 7/16/2022 9:44 AM    Indication: Check interval change in gastric distension, any signs of  obstruction    Comparison: Abdominal radiograph 7/15/2022.    Findings:   Supine portable abdominal radiograph. Nasogastric tube tip and  sidehole overlying the stomach. Feeding tube has been advanced with  tip present towards the DJ flexure. Decreased gaseous distention of  the stomach with scattered contrast and debris in the lumen. No  significant gaseous distention of small or large bowel loops. No  acute  osseous abnormalities.    Bilateral chest tubes in the lung bases partially visualized. Please  see separate chest radiographs.      Impression    Impression:   1.  Nasogastric tube tip and sidehole in the stomach.  2.  Feeding tube tip towards the DJ flexure.  3.  Decreased distention of the stomach with some residual contrast  material and debris in the lumen noted.    YANNICK BENAVIDEZ MD         SYSTEM ID:  [id number]   XR Abdomen Port 1 View    Narrative    EXAM: XR ABDOMEN PORT 1 VIEWS  7/16/2022 8:24 PM      HISTORY: worsening abdominal pain    COMPARISON: Same day abdominal x-ray    FINDINGS: Enteric tubing with tip overlying the third part of  duodenum. Nasogastric tube tip overlying the stomach. Partially  visualized thoracotomy wires. Bibasilar chest tubes.    Residual contrast within the stomach. Nonobstructive bowel gas  pattern. No free air within the abdomen. No pneumatosis. No portal  venous gas. No abnormal calcifications. No visualized masses.    Visualized portions of the lung demonstrate no focal airspace  opacities. Soft tissues and osseous structures are unremarkable.      Impression    IMPRESSION:   Residual contrast within the stomach. No radiographic findings to  explain patient's abdominal pain.    I have personally reviewed the examination and initial interpretation  and I agree with the findings.    ALVINA HARRY MD         SYSTEM ID:  [id number]   XR Chest Port 1 View    Narrative    Portable chest    INDICATION: Interval follow-up post transplant    COMPARISON: Yesterday    FINDINGS: Clamshell sternotomy for prior bilateral lung transplant.  Bibasilar thoracostomy tubes again present. No pneumothorax. Right IJ  catheter tip in the proximal SVC. Feeding tube and NG/OG tube  progressed beyond the inferior margin of the image. Subsegmental  atelectasis in the left lung base. Heart size normal. Calcifications  at the aortic knob.      Impression    IMPRESSION: Prior bilateral lung  transplantation with no significant  acute changes.    ALVINA HARRY MD         SYSTEM ID:  [id number]   XR Chest Port 1 View    Narrative    EXAM: XR CHEST PORT 1 VIEW  7/18/2022 6:05 AM     HISTORY:  Interval f/u s/p transplant       COMPARISON:  Radiographs 7/17/2022, 7/16/2022. CT 7/14/2022.    TECHNIQUE: AP view the chest at 20 degrees    FINDINGS:   Devices, lines, tubes: Right internal jugular central venous catheter  tip projecting over the mid SVC. Feeding tube and enteric tube course  inferior to the left hemidiaphragm with haziness at the field-of-view.  Bibasilar chest tubes in stable positioning. Clamshell sternotomy  wires are intact.    Postoperative changes of bilateral lung transplantation. The trachea  is midline. The cardiomediastinal silhouette is stably enlarged. The  pulmonary vasculature is mildly indistinct. Similar biapical pleural  effusions. Trace residual left pleural effusion. No appreciable  pneumothorax. No acute osseous abnormality.        Impression    IMPRESSION:   1. Supportive devices in similar position.  2. No significant change in biapical pleural effusions.  3. Slightly improved pulmonary interstitial edema.  4. Trace residual left pleural effusion.

## 2022-07-18 NOTE — PROGRESS NOTES
[facility]    Progress Note - Hospitalist Service, GOLD TEAM 10       Date of Admission:  6/28/2022    Assessment & Plan          Sofie Rodriguez is a 62 year old female admitted on 6/28/2022. She has PMH of end stage COPD s/p b/l lung transplant on 6/28/2022, HTN, HFpEF, h/o hepC, oteopenia, and former methamphetamine use, and she was transferred to [place] [id number] [facility] from MICU on 7/15/2022. She was admitted to the MICU on 7/13/20222 with prior hospital course complicated by left upper extremity acute limb ischemia s/p left radial thrombectomy on 7/1/2022. She was primarily treated for acute hypercapnic respiratory failure on intermittent BIPAP with worsening BACILIO while in the MICU. Currently stable on 1L NC. Has persistent abd pain. Scheduled for bronch tomorrow 7/19 and gastric emptying study 7/20.      Summary of today's plan:   - repeat bronchoscopy tomorrow 7/19, NPO @ 6:30AM  - heparin drip for AC for tachyarrhythmia/afib starting today, please hold @ 8:30 AM for bronchoscopy  - NM gastric emptying study scheduled for 7/20 8AM, please have patient NPO @ midnight the night before  - 120mg of furosemide one-time dose given today, will monitor UOP  - Clamp NG and see if patient tolerates; may be able to pull this afternoon. If abdominal pain/nausea return, continue to LIMS and re-assess tomorrow.   - Continue to encourage OOB and up in chair  - BIPAP at night          #End stage COPD s/p BOLT 6/28/2022  #Acute hypercapnic respiratory failure (improving), likely 2/2 decreased central respiratory drive vs respiratory muscle weakness  Patient received bilateral lung transplant for end-stage COPD on 6/28. Chart review shows hypercarbia starting on 7/2 that was not adequately compensated by respiration. On prior exam, patient noted to take shallow, infrequent breaths with occasional episodes of hyperventilation. Ddx for hypercapnic respiratory failure currently includes  decreased central respiratory drive, respiratory muscle weakness, and intrinsic lung pathology. Low concern for encephalitis given patient is on minimal sedating medications with appropriate mental status. TSH normal last week, so unlikely hypothyroidism. Bedside US in MICU showed normal diaphragmatic movement, though formal SNIFF test was performed on 7/14 showed R hemidiaphragm palsy. Of note transplanted lungs were also considered small for body size and could contribute to decreased respiratory compensation for hypercarbia. BIPAP improved patient's pH, and ABG on 7/13 showed pH of 7.35 while on nasal cannula. Elevated pCO2 and bicarb still present. Most recent bronch 7/12 for clearance/inspection given weak cough, cultures NGTD. VBG showed CO2 improvement with HD, but was back to previous on repeat, now improving on night BIPAP. Currently stable on 1L NC with BIPAP overnight.   - repeat bronch 7/19, NPO @ 6:30AM 7/19  - trend VBG, CO2 79 > 73 > 60 > 62 (7/18)  - Continue BIPAP at night   - NC with intermittent BIPAP for night and daytime naps; goal to keep O2sat above 92%  - f/u myasthenia gravis panel  (pending)  - oxycodone decreased from 5 to 2.5-5mg q4h PRN   - encourage activity and getting up in bed; PT/OT participation  - encourage chest physiotherapy QID     #Post-transplant  Immunosuppression:  - Prednisone 15mg BID  - Tacrolimus 5mg BID (goal 8-12, levels   - MMF 1000mg BID  Prophylasxis:  - CMV - Valgancyclover  - Thrush - PO nysatin  - PJP - TMP-SMX (currently held given BACILIO); start dapsone today at 50mg qMWF (will try to increase to daily if tolerable)     #Chest tubes  - 2 chest tubes (basilar) in place currently (pericardial drain was removed 7/15). CT chest 7/14 showing unchanged bilateral effusions and unchanged biapical pneumothoraces with resolved left basilar pneumothorax; bibasilar chest tubes in place with pericardial drain (which was removed 7/15).   - daily CXR  - CV surgery and  transplant pulm following     #Hypotension, improving  #H/o HTN  #H/o HFpEF  Per nephrology note, likely secondary to vasoplegia post surgery. Previously on levophed until 7/10. Last echo 7/7 unchanged from previous showing normal EF with good LV function, so unlikely cardiogenic shock. S/p hydrocortisone 7/6-7/9 and fludrocortisone 7/607/11 without significant improvement, so unlikely adrenal insufficiency. BP in 90s to 140s systolic over past 24 hrs (7/18).   - Decrease Midodrine to 5 mg TID, will try to ween off tomorrow if BP continues to be stable   - goal is SBP>100  - Holding PTA lasix 20mg daily     #C.diff  #Abdominal pain   Worsening diarrhea via rectal pouch with new onset abd pain that is significantly worse as of 7/15. C. Diff positive on 7/11 with vanc started on 7/12. CT abd 7/15 showed no signs of toxic megacolon, but showed very distended stomach; NG was placed 7/15 for decompression. Patient is symptomatically improved while on intermittent suction.   - PO vanc 125mg QID (7/12-7/26)  - holding bowel regimen  - gastric emptying study scheduled for 7/20 8AM, NPO @ midnight prior  - will discuss with patient tomorrow about minimizing narcotic medications prior to performing the gastric emptying study  - Simethicone scheduled       #NJ tube  Feeding regimen:   - Adult Formula Drip Feeding: Continuous Novasource Renal; Nasojejunal; Goal Rate: 35; initiate at goal rate; mL/hr; Medication - Feeding Tube Flush Frequency: At least 15-30 mL water before and after medication administration and with tube clogging  - NPO @ 6:30AM 7/19 for bronch     #BACILIO  #Hypermagnesemia  #Hyperphosphatemia  Baseline renal function was normal prior to surgery. New onset renal failure after transplant, likely multifactorial due to pre-renal hypotension (2.5h bypass during surgery) and intra-renal from use of nephrotoxic agents including tacrolimus and vancomycin. 6/28 UA showed hyaline casts. Cr initially improved but now  rising again along with BUN. Nephrology is on consult and has patient on intermittent HD starting 7/14 via non-tunneled R internal jugular cath. HD on 7/14 was limited by tachyarrhythmia.    - Cr 2.65 > 1.96 > 1.62 > 2.04 (7/18)  -  > 66.2 > 41.1 > 59.5 (7/18)  - nephrology following; patient was dialyzed 7/14-7/16, no dialysis on 7/17; nephrology will evaluate need on a daily basis  - furosemide 120mg one-time dose administered today, will monitor UOP  - R internal jugular non-tunneled cathether placed with HD line on 7/14     #Tachyarrthymia  #Paroxysmal afib  #Paroxysmal aflutter/AT  SVT vs afib. Previously appeared to be positional. Had an occurrence during HD on 7/14. Had afib with RVR to 200s on 7/17. EP consult placed. Patient is hemodynamically stable and asymptomatic during episodes. Last episode (7/17) triggered when patient was moving out of bed to chair. No additional episodes today (7/18).   - Per EP: patient has had episodes of possible flutter (vs AT with 2:1 conduction) and afib with RVR  - start heparin drip and transition to DOAC when safe per surgery (CHADS-VASc score of 2); heparin will be held at 8:30AM tomorrow (7/19) for bronchoscopy  - On telemetry  - On metoprolol tartrate 25mg BID, can titrate up as tolerable  - Discharge on ziopatch for 14 days with EP follow up in 1-2 months after     #Stress-induced hyperglycemia  BG over past 24 hours primarily around 160, with 1 instance of 104 and 1 instance of 223   - on 15U glargine BID; continue today and re-evaluate as needed     #Anemia  Initially from acute blood loss. Hb dropped to 6.8 on 7/14, requiring 1U pRBC. Post-transfusion Hb 9.4 > 8.3 > 8.6 > 8.7 (7/18).   - continue to monitor  - transfuse for hgb<7      Diet: Adult Formula Drip Feeding: Continuous Novasource Renal; Nasojejunal; Goal Rate: 35; initiate at 15 ml/hr and advance by 10 mL Q8H to goal; mL/hr; Medication - Feeding Tube Flush Frequency: At least 15-30 mL water before  and after medication admin...  Regular Diet Adult  NPO per Anesthesia Guidelines for Procedure/Surgery Except for: No Exceptions  NPO per Anesthesia Guidelines for Procedure/Surgery Except for: Meds    DVT Prophylaxis: Heparin SQ  Dong Catheter: Not present  Fluids: none  Central Lines: PRESENT  CVC Double Lumen Right Internal jugular Non - tunneled-Site Assessment: WDL  Cardiac Monitoring: None  Code Status: Full Code      Disposition Plan   Pending further evaluation.        Recent Labs   Lab 07/18/22  1221 07/18/22  0901 07/18/22  0537 07/18/22  0532 07/17/22  0929 07/17/22  0636 07/16/22  0843 07/16/22  0531 07/15/22  0422 07/15/22  0420   WBC  --   --   --  10.5  --  10.2  --  10.1   < >  --    HGB  --   --   --  8.7*  --  8.6*  --  8.7*   < >  --    MCV  --   --   --  98  --  98  --  100   < >  --    PLT  --   --   --  186  --  213  --  229   < >  --    NA  --   --   --  131*  --  127*  --  133*  --  135*   POTASSIUM  --   --   --  4.3  --  3.5  --  4.8  --  4.6   CHLORIDE  --   --   --  94*  --  90*  --  97*  --  93*   CO2  --   --   --  31*  --  27  --  31*  --  34*   BUN  --   --   --  59.5*  --  41.1*  --  66.2*  --  123.0*   CR  --   --   --  2.04*  --  1.62*  --  1.96*  --  2.65*   ANIONGAP  --   --   --  6*  --  10  --  5*  --  8   ESTUARDO  --   --   --  8.6*  --  8.4*  --  8.7*  --  8.7*   * 164* 199* 202*   < > 148*   < > 144*   < > 245*   ALBUMIN  --   --   --  2.8*  --   --   --   --   --  2.9*   PROTTOTAL  --   --   --  4.7*  --   --   --   --   --  4.9*   BILITOTAL  --   --   --  0.2  --   --   --   --   --  0.2   ALKPHOS  --   --   --  85  --   --   --   --   --  74   ALT  --   --   --  21  --   --   --   --   --  32   AST  --   --   --  19  --   --   --   --   --  31   LIPASE  --   --   --   --   --   --   --   --   --  21    < > = values in this interval not displayed.     Recent Results (from the past 24 hour(s))   XR Chest Port 1 View    Narrative    EXAM: XR CHEST PORT 1 VIEW  7/18/2022  6:05 AM     HISTORY:  Interval f/u s/p transplant       COMPARISON:  Radiographs 7/17/2022, 7/16/2022. CT 7/14/2022.    TECHNIQUE: AP view the chest at 20 degrees    FINDINGS:   Devices, lines, tubes: Right internal jugular central venous catheter  tip projecting over the mid SVC. Feeding tube and enteric tube course  inferior to the left hemidiaphragm with haziness at the field-of-view.  Bibasilar chest tubes in stable positioning. Clamshell sternotomy  wires are intact.    Postoperative changes of bilateral lung transplantation. The trachea  is midline. The cardiomediastinal silhouette is stably enlarged. The  pulmonary vasculature is mildly indistinct. Similar biapical pleural  effusions. Trace residual left pleural effusion. No appreciable  pneumothorax. No acute osseous abnormality.        Impression    IMPRESSION:   1. Supportive devices in similar position.  2. No significant change in biapical pleural effusions.  3. Slightly improved pulmonary interstitial edema.  4. Trace residual left pleural effusion.        The patient's care was discussed with the Attending Physician, Dr. Mahan.    Fan Lee MD  Hospitalist Service, [address]  Securely message with the Vocera Web Console (learn more here)  Text page via [facility] Paging/Directory   Please see signed in provider for up to date coverage information      Clinically Significant Risk Factors Present on Admission                      ______________________________________________________________________    Interval History   No acute events overnight. Patient had a bit of discomfort with her morning meds. She was given zofran with her AM meds, which she endorsed reduced the discomfort. Oxygen was increased to 2L during this time. No vomiting. Last BM was last night at 8PM, which was soft. Denies fevers/chills, palpitations, chest pain.     Data reviewed today: I reviewed all medications, new labs  and imaging results over the last 24 hours. I personally reviewed the chest x-ray image(s) showing bibasilar chest tubes in place with stable bilateral apical effusions and improving pulmonary interstitial edema.    Physical Exam   Vital Signs: Temp: 98  F (36.7  C) Temp src: Oral BP: 135/65 Pulse: 83   Resp: 10 SpO2: 100 % O2 Device: Oxymask Oxygen Delivery: 1 LPM  Weight: 157 lbs 13.59 oz  Physical Exam  Constitutional:       General: She is not in acute distress.     Appearance: Normal appearance. She is not ill-appearing.   HENT:      Head: Normocephalic and atraumatic.   Cardiovascular:      Rate and Rhythm: Normal rate and regular rhythm.      Pulses: Normal pulses.      Heart sounds: Normal heart sounds. No murmur heard.    No gallop.   Pulmonary:      Comments: Clear to auscultation of anterior upper lung fields.   Abdominal:      General: Bowel sounds are normal.      Palpations: Abdomen is soft.      Comments: Tender to palpation of epigastric/LUQ region. No rebound tenderness.    Musculoskeletal:      Comments: 1-2+ pitting edema of bilateral lower extremities.    Skin:     General: Skin is warm and dry.      Capillary Refill: Capillary refill takes less than 2 seconds.   Neurological:      General: No focal deficit present.      Mental Status: She is alert and oriented to person, place, and time.   Psychiatric:         Mood and Affect: Mood normal.           Data   Recent Labs   Lab 07/18/22  1221 07/18/22  0901 07/18/22  0537 07/18/22  0532 07/17/22  0929 07/17/22  0636 07/16/22  0843 07/16/22  0531 07/15/22  0422 07/15/22  0420   WBC  --   --   --  10.5  --  10.2  --  10.1   < >  --    HGB  --   --   --  8.7*  --  8.6*  --  8.7*   < >  --    MCV  --   --   --  98  --  98  --  100   < >  --    PLT  --   --   --  186  --  213  --  229   < >  --    NA  --   --   --  131*  --  127*  --  133*  --  135*   POTASSIUM  --   --   --  4.3  --  3.5  --  4.8  --  4.6   CHLORIDE  --   --   --  94*  --  90*  --  97*   --  93*   CO2  --   --   --  31*  --  27  --  31*  --  34*   BUN  --   --   --  59.5*  --  41.1*  --  66.2*  --  123.0*   CR  --   --   --  2.04*  --  1.62*  --  1.96*  --  2.65*   ANIONGAP  --   --   --  6*  --  10  --  5*  --  8   ESTUARDO  --   --   --  8.6*  --  8.4*  --  8.7*  --  8.7*   * 164* 199* 202*   < > 148*   < > 144*   < > 245*   ALBUMIN  --   --   --  2.8*  --   --   --   --   --  2.9*   PROTTOTAL  --   --   --  4.7*  --   --   --   --   --  4.9*   BILITOTAL  --   --   --  0.2  --   --   --   --   --  0.2   ALKPHOS  --   --   --  85  --   --   --   --   --  74   ALT  --   --   --  21  --   --   --   --   --  32   AST  --   --   --  19  --   --   --   --   --  31   LIPASE  --   --   --   --   --   --   --   --   --  21    < > = values in this interval not displayed.     Recent Results (from the past 24 hour(s))   XR Chest Port 1 View    Narrative    EXAM: XR CHEST PORT 1 VIEW  7/18/2022 6:05 AM     HISTORY:  Interval f/u s/p transplant       COMPARISON:  Radiographs 7/17/2022, 7/16/2022. CT 7/14/2022.    TECHNIQUE: AP view the chest at 20 degrees    FINDINGS:   Devices, lines, tubes: Right internal jugular central venous catheter  tip projecting over the mid SVC. Feeding tube and enteric tube course  inferior to the left hemidiaphragm with haziness at the field-of-view.  Bibasilar chest tubes in stable positioning. Clamshell sternotomy  wires are intact.    Postoperative changes of bilateral lung transplantation. The trachea  is midline. The cardiomediastinal silhouette is stably enlarged. The  pulmonary vasculature is mildly indistinct. Similar biapical pleural  effusions. Trace residual left pleural effusion. No appreciable  pneumothorax. No acute osseous abnormality.        Impression    IMPRESSION:   1. Supportive devices in similar position.  2. No significant change in biapical pleural effusions.  3. Slightly improved pulmonary interstitial edema.  4. Trace residual left pleural effusion.

## 2022-07-19 ENCOUNTER — APPOINTMENT (OUTPATIENT)
Dept: GENERAL RADIOLOGY | Facility: CLINIC | Age: 62
DRG: 007 | End: 2022-07-19
Attending: NURSE PRACTITIONER
Payer: MEDICARE

## 2022-07-19 ENCOUNTER — APPOINTMENT (OUTPATIENT)
Dept: PHYSICAL THERAPY | Facility: CLINIC | Age: 62
DRG: 007 | End: 2022-07-19
Attending: THORACIC SURGERY (CARDIOTHORACIC VASCULAR SURGERY)
Payer: MEDICARE

## 2022-07-19 PROBLEM — J98.6 DIAPHRAGM DYSFUNCTION: Status: ACTIVE | Noted: 2022-07-19

## 2022-07-19 PROBLEM — D84.9 IMMUNOSUPPRESSED STATUS (H): Chronic | Status: ACTIVE | Noted: 2022-06-29

## 2022-07-19 PROBLEM — I74.2: Status: ACTIVE | Noted: 2022-07-01

## 2022-07-19 PROBLEM — I48.0 PAROXYSMAL A-FIB (H): Status: ACTIVE | Noted: 2022-07-17

## 2022-07-19 LAB
ANION GAP SERPL CALCULATED.3IONS-SCNC: 7 MMOL/L (ref 7–15)
BASE EXCESS BLDV CALC-SCNC: 8.2 MMOL/L (ref -7.7–1.9)
BASOPHILS # BLD AUTO: 0 10E3/UL (ref 0–0.2)
BASOPHILS NFR BLD AUTO: 0 %
BUN SERPL-MCNC: 80.7 MG/DL (ref 8–23)
CALCIUM SERPL-MCNC: 8.5 MG/DL (ref 8.8–10.2)
CHLORIDE SERPL-SCNC: 94 MMOL/L (ref 98–107)
CREAT SERPL-MCNC: 2.06 MG/DL (ref 0.51–0.95)
DEPRECATED HCO3 PLAS-SCNC: 34 MMOL/L (ref 22–29)
EOSINOPHIL # BLD AUTO: 0 10E3/UL (ref 0–0.7)
EOSINOPHIL NFR BLD AUTO: 0 %
ERYTHROCYTE [DISTWIDTH] IN BLOOD BY AUTOMATED COUNT: 16.6 % (ref 10–15)
GFR SERPL CREATININE-BSD FRML MDRD: 27 ML/MIN/1.73M2
GLUCOSE BLDC GLUCOMTR-MCNC: 103 MG/DL (ref 70–99)
GLUCOSE BLDC GLUCOMTR-MCNC: 111 MG/DL (ref 70–99)
GLUCOSE BLDC GLUCOMTR-MCNC: 123 MG/DL (ref 70–99)
GLUCOSE BLDC GLUCOMTR-MCNC: 132 MG/DL (ref 70–99)
GLUCOSE BLDC GLUCOMTR-MCNC: 136 MG/DL (ref 70–99)
GLUCOSE BLDC GLUCOMTR-MCNC: 166 MG/DL (ref 70–99)
GLUCOSE BLDC GLUCOMTR-MCNC: 177 MG/DL (ref 70–99)
GLUCOSE SERPL-MCNC: 104 MG/DL (ref 70–99)
HBV DNA SERPL QL NAA+PROBE: NORMAL
HCO3 BLDV-SCNC: 37 MMOL/L (ref 21–28)
HCT VFR BLD AUTO: 25.9 % (ref 35–47)
HCV RNA SERPL QL NAA+PROBE: NORMAL
HGB BLD-MCNC: 8.3 G/DL (ref 11.7–15.7)
HIV1+2 RNA SERPL QL NAA+PROBE: NORMAL
IMM GRANULOCYTES # BLD: 0.2 10E3/UL
IMM GRANULOCYTES NFR BLD: 2 %
LYMPHOCYTES # BLD AUTO: 0.3 10E3/UL (ref 0.8–5.3)
LYMPHOCYTES NFR BLD AUTO: 3 %
MAGNESIUM SERPL-MCNC: 3 MG/DL (ref 1.7–2.3)
MAYO MISC RESULT: NORMAL
MCH RBC QN AUTO: 31.6 PG (ref 26.5–33)
MCHC RBC AUTO-ENTMCNC: 32 G/DL (ref 31.5–36.5)
MCV RBC AUTO: 99 FL (ref 78–100)
MONOCYTES # BLD AUTO: 0.5 10E3/UL (ref 0–1.3)
MONOCYTES NFR BLD AUTO: 5 %
NEUTROPHILS # BLD AUTO: 9.9 10E3/UL (ref 1.6–8.3)
NEUTROPHILS NFR BLD AUTO: 90 %
NRBC # BLD AUTO: 0 10E3/UL
NRBC BLD AUTO-RTO: 0 /100
O2/TOTAL GAS SETTING VFR VENT: 20 %
PCO2 BLDV: 73 MM HG (ref 40–50)
PH BLDV: 7.31 [PH] (ref 7.32–7.43)
PHOSPHATE SERPL-MCNC: 7.3 MG/DL (ref 2.5–4.5)
PLATELET # BLD AUTO: 243 10E3/UL (ref 150–450)
PO2 BLDV: 44 MM HG (ref 25–47)
POTASSIUM SERPL-SCNC: 3.5 MMOL/L (ref 3.4–5.3)
RBC # BLD AUTO: 2.63 10E6/UL (ref 3.8–5.2)
SODIUM SERPL-SCNC: 135 MMOL/L (ref 136–145)
UFH PPP CHRO-ACNC: 0.64 IU/ML
UFH PPP CHRO-ACNC: <0.1 IU/ML
WBC # BLD AUTO: 10.9 10E3/UL (ref 4–11)

## 2022-07-19 PROCEDURE — 99233 SBSQ HOSP IP/OBS HIGH 50: CPT | Mod: 24 | Performed by: INTERNAL MEDICINE

## 2022-07-19 PROCEDURE — 71045 X-RAY EXAM CHEST 1 VIEW: CPT | Mod: 26 | Performed by: RADIOLOGY

## 2022-07-19 PROCEDURE — 97116 GAIT TRAINING THERAPY: CPT | Mod: GP

## 2022-07-19 PROCEDURE — 0BJ08ZZ INSPECTION OF TRACHEOBRONCHIAL TREE, VIA NATURAL OR ARTIFICIAL OPENING ENDOSCOPIC: ICD-10-PCS | Performed by: INTERNAL MEDICINE

## 2022-07-19 PROCEDURE — 999N000099 HC STATISTIC MODERATE SEDATION < 10 MIN: Performed by: INTERNAL MEDICINE

## 2022-07-19 PROCEDURE — 36415 COLL VENOUS BLD VENIPUNCTURE: CPT | Performed by: NURSE PRACTITIONER

## 2022-07-19 PROCEDURE — 250N000013 HC RX MED GY IP 250 OP 250 PS 637: Performed by: NURSE PRACTITIONER

## 2022-07-19 PROCEDURE — 82803 BLOOD GASES ANY COMBINATION: CPT | Performed by: NURSE PRACTITIONER

## 2022-07-19 PROCEDURE — 94660 CPAP INITIATION&MGMT: CPT

## 2022-07-19 PROCEDURE — 250N000011 HC RX IP 250 OP 636: Performed by: INTERNAL MEDICINE

## 2022-07-19 PROCEDURE — 250N000013 HC RX MED GY IP 250 OP 250 PS 637: Performed by: SURGERY

## 2022-07-19 PROCEDURE — 99233 SBSQ HOSP IP/OBS HIGH 50: CPT | Mod: GC | Performed by: STUDENT IN AN ORGANIZED HEALTH CARE EDUCATION/TRAINING PROGRAM

## 2022-07-19 PROCEDURE — 250N000011 HC RX IP 250 OP 636: Performed by: STUDENT IN AN ORGANIZED HEALTH CARE EDUCATION/TRAINING PROGRAM

## 2022-07-19 PROCEDURE — 94640 AIRWAY INHALATION TREATMENT: CPT

## 2022-07-19 PROCEDURE — 99232 SBSQ HOSP IP/OBS MODERATE 35: CPT | Mod: 24 | Performed by: CLINICAL NURSE SPECIALIST

## 2022-07-19 PROCEDURE — 250N000013 HC RX MED GY IP 250 OP 250 PS 637

## 2022-07-19 PROCEDURE — 83735 ASSAY OF MAGNESIUM: CPT | Performed by: SURGERY

## 2022-07-19 PROCEDURE — 250N000013 HC RX MED GY IP 250 OP 250 PS 637: Performed by: THORACIC SURGERY (CARDIOTHORACIC VASCULAR SURGERY)

## 2022-07-19 PROCEDURE — 85520 HEPARIN ASSAY: CPT | Performed by: INTERNAL MEDICINE

## 2022-07-19 PROCEDURE — 250N000009 HC RX 250: Performed by: SURGERY

## 2022-07-19 PROCEDURE — 31622 DX BRONCHOSCOPE/WASH: CPT | Performed by: INTERNAL MEDICINE

## 2022-07-19 PROCEDURE — 31622 DX BRONCHOSCOPE/WASH: CPT | Mod: GC | Performed by: INTERNAL MEDICINE

## 2022-07-19 PROCEDURE — 250N000013 HC RX MED GY IP 250 OP 250 PS 637: Performed by: HOSPITALIST

## 2022-07-19 PROCEDURE — 82310 ASSAY OF CALCIUM: CPT | Performed by: NURSE PRACTITIONER

## 2022-07-19 PROCEDURE — 97530 THERAPEUTIC ACTIVITIES: CPT | Mod: GP

## 2022-07-19 PROCEDURE — 71045 X-RAY EXAM CHEST 1 VIEW: CPT

## 2022-07-19 PROCEDURE — 85025 COMPLETE CBC W/AUTO DIFF WBC: CPT | Performed by: NURSE PRACTITIONER

## 2022-07-19 PROCEDURE — 250N000012 HC RX MED GY IP 250 OP 636 PS 637: Performed by: PHYSICIAN ASSISTANT

## 2022-07-19 PROCEDURE — 250N000012 HC RX MED GY IP 250 OP 636 PS 637: Performed by: THORACIC SURGERY (CARDIOTHORACIC VASCULAR SURGERY)

## 2022-07-19 PROCEDURE — 999N000127 HC STATISTIC PERIPHERAL IV START W US GUIDANCE

## 2022-07-19 PROCEDURE — 94668 MNPJ CHEST WALL SBSQ: CPT

## 2022-07-19 PROCEDURE — 120N000002 HC R&B MED SURG/OB UMMC

## 2022-07-19 PROCEDURE — 99233 SBSQ HOSP IP/OBS HIGH 50: CPT | Mod: FS | Performed by: CLINICAL NURSE SPECIALIST

## 2022-07-19 PROCEDURE — 250N000013 HC RX MED GY IP 250 OP 250 PS 637: Performed by: STUDENT IN AN ORGANIZED HEALTH CARE EDUCATION/TRAINING PROGRAM

## 2022-07-19 PROCEDURE — 84100 ASSAY OF PHOSPHORUS: CPT | Performed by: SURGERY

## 2022-07-19 PROCEDURE — 999N000157 HC STATISTIC RCP TIME EA 10 MIN

## 2022-07-19 PROCEDURE — 36415 COLL VENOUS BLD VENIPUNCTURE: CPT | Performed by: INTERNAL MEDICINE

## 2022-07-19 PROCEDURE — 250N000009 HC RX 250: Performed by: INTERNAL MEDICINE

## 2022-07-19 PROCEDURE — 94640 AIRWAY INHALATION TREATMENT: CPT | Mod: 76

## 2022-07-19 RX ORDER — LIDOCAINE HYDROCHLORIDE 10 MG/ML
INJECTION, SOLUTION INFILTRATION; PERINEURAL PRN
Status: COMPLETED | OUTPATIENT
Start: 2022-07-19 | End: 2022-07-19

## 2022-07-19 RX ORDER — LIDOCAINE HYDROCHLORIDE 40 MG/ML
INJECTION, SOLUTION RETROBULBAR PRN
Status: COMPLETED | OUTPATIENT
Start: 2022-07-19 | End: 2022-07-19

## 2022-07-19 RX ADMIN — LEVALBUTEROL HYDROCHLORIDE 1.25 MG: 1.25 SOLUTION RESPIRATORY (INHALATION) at 13:02

## 2022-07-19 RX ADMIN — Medication 40 MG: at 06:17

## 2022-07-19 RX ADMIN — MIDODRINE HYDROCHLORIDE 5 MG: 5 TABLET ORAL at 05:27

## 2022-07-19 RX ADMIN — ACETYLCYSTEINE 2 ML: 200 SOLUTION ORAL; RESPIRATORY (INHALATION) at 13:02

## 2022-07-19 RX ADMIN — MYCOPHENOLATE MOFETIL 1000 MG: 200 POWDER, FOR SUSPENSION ORAL at 06:20

## 2022-07-19 RX ADMIN — ACETAMINOPHEN 975 MG: 325 TABLET, FILM COATED ORAL at 19:02

## 2022-07-19 RX ADMIN — ACETAMINOPHEN 975 MG: 325 TABLET, FILM COATED ORAL at 06:12

## 2022-07-19 RX ADMIN — ACETYLCYSTEINE 2 ML: 200 SOLUTION ORAL; RESPIRATORY (INHALATION) at 09:04

## 2022-07-19 RX ADMIN — Medication 1 PACKET: at 16:03

## 2022-07-19 RX ADMIN — NYSTATIN: 100000 CREAM TOPICAL at 20:50

## 2022-07-19 RX ADMIN — LEVALBUTEROL HYDROCHLORIDE 1.25 MG: 1.25 SOLUTION RESPIRATORY (INHALATION) at 20:15

## 2022-07-19 RX ADMIN — ONDANSETRON 4 MG: 4 TABLET, ORALLY DISINTEGRATING ORAL at 05:27

## 2022-07-19 RX ADMIN — LEVALBUTEROL HYDROCHLORIDE 1.25 MG: 1.25 SOLUTION RESPIRATORY (INHALATION) at 09:04

## 2022-07-19 RX ADMIN — VANCOMYCIN HYDROCHLORIDE 125 MG: KIT at 01:47

## 2022-07-19 RX ADMIN — ACETYLCYSTEINE 2 ML: 200 SOLUTION ORAL; RESPIRATORY (INHALATION) at 16:52

## 2022-07-19 RX ADMIN — NYSTATIN 1000000 UNITS: 100000 SUSPENSION ORAL at 05:27

## 2022-07-19 RX ADMIN — SIMETHICONE 80 MG: 80 TABLET, CHEWABLE ORAL at 05:27

## 2022-07-19 RX ADMIN — PREDNISONE 15 MG: 5 TABLET ORAL at 20:49

## 2022-07-19 RX ADMIN — MYCOPHENOLATE MOFETIL 1000 MG: 200 POWDER, FOR SUSPENSION ORAL at 20:50

## 2022-07-19 RX ADMIN — ACETAMINOPHEN 975 MG: 325 TABLET, FILM COATED ORAL at 16:17

## 2022-07-19 RX ADMIN — LIDOCAINE HYDROCHLORIDE 12 ML: 10 INJECTION, SOLUTION INFILTRATION; PERINEURAL at 15:14

## 2022-07-19 RX ADMIN — PREDNISONE 15 MG: 5 TABLET ORAL at 06:15

## 2022-07-19 RX ADMIN — CALCIUM CARBONATE 600 MG (1,500 MG)-VITAMIN D3 400 UNIT TABLET 1 TABLET: at 06:14

## 2022-07-19 RX ADMIN — SIMETHICONE 80 MG: 80 TABLET, CHEWABLE ORAL at 20:49

## 2022-07-19 RX ADMIN — INSULIN ASPART 2 UNITS: 100 INJECTION, SOLUTION INTRAVENOUS; SUBCUTANEOUS at 00:00

## 2022-07-19 RX ADMIN — METOPROLOL TARTRATE 25 MG: 25 TABLET, FILM COATED ORAL at 06:16

## 2022-07-19 RX ADMIN — Medication 1 CAPSULE: at 06:14

## 2022-07-19 RX ADMIN — ASPIRIN 81 MG CHEWABLE TABLET 81 MG: 81 TABLET CHEWABLE at 06:17

## 2022-07-19 RX ADMIN — VANCOMYCIN HYDROCHLORIDE 125 MG: KIT at 20:50

## 2022-07-19 RX ADMIN — THERA TABS 1 TABLET: TAB at 16:17

## 2022-07-19 RX ADMIN — CALCIUM CARBONATE 600 MG (1,500 MG)-VITAMIN D3 400 UNIT TABLET 1 TABLET: at 17:26

## 2022-07-19 RX ADMIN — LEVALBUTEROL HYDROCHLORIDE 1.25 MG: 1.25 SOLUTION RESPIRATORY (INHALATION) at 16:52

## 2022-07-19 RX ADMIN — VANCOMYCIN HYDROCHLORIDE 125 MG: KIT at 16:03

## 2022-07-19 RX ADMIN — INSULIN GLARGINE 15 UNITS: 100 INJECTION, SOLUTION SUBCUTANEOUS at 20:51

## 2022-07-19 RX ADMIN — SIMETHICONE 80 MG: 80 TABLET, CHEWABLE ORAL at 16:17

## 2022-07-19 RX ADMIN — Medication 40 MG: at 23:20

## 2022-07-19 RX ADMIN — TACROLIMUS 5 MG: 5 CAPSULE ORAL at 17:26

## 2022-07-19 RX ADMIN — NYSTATIN 1000000 UNITS: 100000 SUSPENSION ORAL at 16:05

## 2022-07-19 RX ADMIN — METOPROLOL TARTRATE 25 MG: 25 TABLET, FILM COATED ORAL at 20:49

## 2022-07-19 RX ADMIN — LIDOCAINE HYDROCHLORIDE 9 ML: 40 INJECTION, SOLUTION RETROBULBAR; TOPICAL at 15:13

## 2022-07-19 RX ADMIN — Medication 1 CAPSULE: at 20:49

## 2022-07-19 RX ADMIN — NYSTATIN: 100000 CREAM TOPICAL at 06:19

## 2022-07-19 RX ADMIN — Medication 2.5 MG: at 00:27

## 2022-07-19 RX ADMIN — INSULIN ASPART 2 UNITS: 100 INJECTION, SOLUTION INTRAVENOUS; SUBCUTANEOUS at 20:51

## 2022-07-19 RX ADMIN — NYSTATIN 1000000 UNITS: 100000 SUSPENSION ORAL at 20:49

## 2022-07-19 RX ADMIN — THIAMINE HCL TAB 100 MG 100 MG: 100 TAB at 06:16

## 2022-07-19 RX ADMIN — TACROLIMUS 5 MG: 5 CAPSULE ORAL at 06:18

## 2022-07-19 RX ADMIN — MIDAZOLAM 1 MG: 1 INJECTION INTRAMUSCULAR; INTRAVENOUS at 15:08

## 2022-07-19 RX ADMIN — ACETYLCYSTEINE 2 ML: 200 SOLUTION ORAL; RESPIRATORY (INHALATION) at 20:15

## 2022-07-19 RX ADMIN — VANCOMYCIN HYDROCHLORIDE 125 MG: KIT at 06:20

## 2022-07-19 ASSESSMENT — ACTIVITIES OF DAILY LIVING (ADL)
ADLS_ACUITY_SCORE: 38
ADLS_ACUITY_SCORE: 30
ADLS_ACUITY_SCORE: 34
ADLS_ACUITY_SCORE: 38
ADLS_ACUITY_SCORE: 30
ADLS_ACUITY_SCORE: 38
ADLS_ACUITY_SCORE: 34
ADLS_ACUITY_SCORE: 38
ADLS_ACUITY_SCORE: 34
ADLS_ACUITY_SCORE: 38
ADLS_ACUITY_SCORE: 32
ADLS_ACUITY_SCORE: 30

## 2022-07-19 NOTE — PROGRESS NOTES
Nephrology Progress Note  07/19/2022           Sofie Rodriguez is a 62 yom with hx of HTN, Hep C, osetopenia, previous polysubstance use (stopped 2019) and severe COPD who is s/p BSLT on 6/28/2022.  Had ~2.5h of bypass time, was uncomplicated but now has post op BACILIO in setting of continued need for vasopressors thought to be due to vasoplegia.  Had radial artery thrombus requiring thrombectomy on 7/2.  Nephrology consulted for BACILIO with mild hyperkalemia and oliguric UOP.       Interval History :   Mrs Higgins had past 2 days off of HD, Cr stable at 2.0 suggesting possible recovery.  Given this we can hold on run and continue to monitor for recovery given UOP is also increasing with 850cc yesterday.         Assessment & Recommendations:   BACILIO-Normal baseline renal fx historically and at time of surgery.  No renal imaging but low suspicion of obstruction so will consider if she does not show improvement.  BACILIO is likely hypoperfusion with ~2.5h of bypass time and tacrolimus although levels have been within range (needing higher doses of tacro).   Will manage this medically for now and continue to monitor.  UA initially showed hyaline casts on 6/28..  Noted that she did not get contrast for thrombectomy on 7/2.  Cr had started to improve but now again on the rise prompting starting RRT on 7/14.                -BACILIO, likely hypoperfusion and need for tacro, also now with supratherapeutic vanco.   With lack of improvement and ongoing need for tacro with up and down levels we started dialysis on 7/14 while still watching for recovery.                 -Holding on run today, Cr stable, will follow for more recovery.                 -Dialysis consent is signed and scanned in under media 7/14.                -Continue to avoid nephrotoxins, norepi weaned off, would keep SBP >100 as able, hypotensive due to vasoplegia.       Volume-Net negative yesterday with 850cc of UOP without diuretics, holding on run today with stable Cr.   "     Electrolytes-K 3.5, bicarb 34, VBG 7.31/73/44/37.  Hypercapnia thought to be related to longstanding COPD and should get better with time after transplant.       BMD-Ca 8.5, iCal WNL throughout, will follow.  Mg and Phos mildly up consistent with BACILIO.       Lung Tx-On Tacro, levels have been within range, last level 7.9.  Has hypercapnia despite transplant and reasonable CXR/oxygenation, team expects this to improve with time.       Anemia-Hgb 8.3, acute management per team.       Nutrition-Renal TF     Time spent: 30 minutes on this date of encounter for chart review, physical exam, medical decision making and co-ordination of care.      Seen and discussed with Dr Pimentel     Recommendations were communicated to primary team via verbal communication.       RUFUS Cedeño CNS  Clinical Nurse Specialist  [phone]      Review of Systems:   I reviewed the following systems:  Gen: No fevers or chills  CV: No CP at rest  Resp: No SOB at rest  GI: No N/V      Physical Exam:   I/O last 3 completed shifts:  In: 1764.33 [P.O.:30; I.V.:209.33; NG/GT:715; IV Piggyback:250]  Out: 1690 [Urine:600; Emesis/NG output:700; Chest Tube:390]   BP (!) 150/83   Pulse 95   Temp 97.9  F (36.6  C) (Oral)   Resp 22   Ht 1.575 m (5' 2\")   Wt 72.2 kg (159 lb 2.8 oz)   SpO2 100%   BMI 29.11 kg/m       GENERAL APPEARANCE: On NC, in no distress.    EYES: No scleral icterus  NECK:  No JVD  Pulmonary: lungs clear to auscultation with equal breath sounds bilaterally, no clubbing or cyanosis  CV: Regular rhythm, normal rate, no rub   - Edema+2 generalized.   GI: soft, nontender, normal bowel sounds  MS: no evidence of inflammation in joints, no muscle tenderness  : + Dong  SKIN: no rash, warm, dry  NEURO: Answering questions appropriately, no focal deficits.      Labs:   All labs reviewed by me  Electrolytes/Renal - Recent Labs   Lab Test 07/19/22  0810 07/19/22  0721 07/19/22  0359 07/18/22  0537 07/18/22  0532 07/17/22  0929 " 07/17/22  0636   NA  --  135*  --   --  131*  --  127*   POTASSIUM  --  3.5  --   --  4.3  --  3.5   CHLORIDE  --  94*  --   --  94*  --  90*   CO2  --  34*  --   --  31*  --  27   BUN  --  80.7*  --   --  59.5*  --  41.1*   CR  --  2.06*  --   --  2.04*  --  1.62*   * 104* 132*   < > 202*   < > 148*   ESTUARDO  --  8.5*  --   --  8.6*  --  8.4*   MAG  --  3.0*  --   --  2.8*  --  1.9   PHOS  --  7.3*  --   --  5.7*  --  4.1    < > = values in this interval not displayed.       CBC -   Recent Labs   Lab Test 07/19/22  0721 07/18/22  1355 07/18/22  0532   WBC 10.9 13.7* 10.5   HGB 8.3* 8.9* 8.7*    246 186       LFTs -   Recent Labs   Lab Test 07/18/22  0532 07/15/22  0420 07/14/22  0459   ALKPHOS 85 74 64   BILITOTAL 0.2 0.2 0.2   ALT 21 32 22   AST 19 31 22   PROTTOTAL 4.7* 4.9* 4.6*   ALBUMIN 2.8* 2.9* 2.6*       Iron Panel - No lab results found.        Current Medications:    acetaminophen  975 mg Oral or Feeding Tube TID     acetylcysteine  2 mL Nebulization 4x Daily     aspirin  81 mg Oral Daily     calcium carbonate 600 mg-vitamin D 400 units  1 tablet Oral BID w/meals     ceFAZolin  2 g Intravenous Pre-Op/Pre-procedure x 1 dose     dapsone  50 mg Oral or Feeding Tube Once per day on Mon Wed Fri     insulin aspart  1-12 Units Subcutaneous Q4H     insulin glargine  15 Units Subcutaneous BID     lactobacillus rhamnosus (GG)  1 capsule Oral BID     levalbuterol  1.25 mg Nebulization 4x Daily     magnesium oxide  400 mg Oral Daily     metoprolol tartrate  25 mg Oral BID     [Held by provider] midodrine  5 mg Oral or Feeding Tube Q8H     multivitamin, therapeutic  1 tablet Per Feeding Tube Daily     mycophenolate  1,000 mg Oral or Feeding Tube BID     nystatin   Topical BID     nystatin  1,000,000 Units Swish & Swallow 4x Daily     ondansetron  4 mg Oral Daily     pantoprazole  40 mg Oral or Feeding Tube BID     predniSONE  15 mg Per Feeding Tube BID     protein modular  1 packet Per Feeding Tube Daily      simethicone  80 mg Oral 4x Daily     sodium chloride (PF)  3 mL Intracatheter Q8H     sodium chloride (PF)  3 mL Intracatheter Q8H     tacrolimus  5 mg Per Feeding Tube BID IS     thiamine  100 mg Oral or Feeding Tube Daily     valGANciclovir  450 mg Oral or NG Tube Once per day on Mon Thu     vancomycin  125 mg Oral or Feeding Tube Q6H TONY       dextrose Stopped (07/15/22 1801)     heparin Stopped (07/19/22 0800)

## 2022-07-19 NOTE — PLAN OF CARE
Neuro: A&Ox4. Occasionally forgetful. No numbness or tingling.   Cardiac: SR. VSS. MAP goal above 65. Midodrine held. Page team for SBP below 90.   Respiratory: 2L oxymask, Sating >90%. BiPAP at night, 30% FiO2, 15/5.   GI/: Adequate urine output via commode. LBM 7/19.   Diet/appetite: NPO, NG, NJ. NG to LIS. NJ TFs 35mL/hr 30mL Q4H FWF.   Activity:  Assist of 1, walker, gb, pivot to commode.   Pain: At acceptable level on current regimen.   Skin: No new deficits noted. Continuing barrier cream on perineal area.   LDA's: R internal jugular, L PIV, R and L Chest Tube both to -20 suction, NG to LIS, NJ to TFs.    Bronch completed today. Plan for gastric emptying study tomorrow 7/20. NPO at midnight, stop tube feeds. Hold all narcotics for 24 hrs before gastric emptying study 7/20.     Per primary team, goal to decrease midodrine needed. Midodrine held, RN to notify primary team is SBP is lower than 90.     Will continue with plan of care and notify primary team of any further changes.     Caprice Patel, RN

## 2022-07-19 NOTE — PROGRESS NOTES
Transfer  Transferred from:   Via: stretcher   Reason for transfer: Pt appropriate for 6D- improved/worsened patient condition  Family: Aware of transfer  Belongings: Received with pt  Chart: Received with pt  Medications: Meds received from old unit with pt  Code Status verified on armband: yes   2 RN Skin Assessment Completed By: Caprice RITTER, Sally RITTER   Med rec completed: yes/no  Bed surface reassessed with algorithm and charted: yes   New bed surface ordered: yes   Suction/Ambu bag/Flowmeter at bedside: yes    Report received from: Claribel Patel, RN

## 2022-07-19 NOTE — PROGRESS NOTES
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 Sunshine. This was complicated by left upper extremity acute limb ischemia s/p left radial thrombectomy on 7/1/22. Recovering renal function, will hold off on tunneled dialysis line at this time.       - Right Pleural tube remains to suction, elevated output, can ambulate off suction.   - Left pleural tube remains to suction for elevated output. Left tube site needs Vaseline gauze gauze wrapped around tube at the skin to avoid air leaking into chest. Can ambulate off suction.   - Bilateral chest tubes stripped 7/17, small amount of serous output at that time.  Stripped again 7/18 without immediate return of pleural fluid  - Removed pericardial tube 7/15 without immediate complication.      Discussed with Dr Sunshine.    Andres Wilson PA-C  Cardiothoracic Surgery  Pager [phone]    5:39 PM   July 19, 2022        Interval History:     No overnight events.  Left pleural tube drained at skin.   States pain is sometimes unmanageable and refusing PT/OT at times.          Physical Exam:   Blood pressure 108/64, pulse 99, temperature 98.4  F (36.9  C), temperature source Oral, resp. rate 15, height 1.575 m (5' 2\"), weight 72.2 kg (159 lb 2.8 oz), SpO2 100 %, not currently breastfeeding.  Vitals:    07/17/22 0000 07/18/22 0531 07/19/22 0600   Weight: 71.6 kg (157 lb 13.6 oz) 71.6 kg (157 lb 13.6 oz) 72.2 kg (159 lb 2.8 oz)      Gen: A&Ox4, NAD  Neuro: no focal deficits   Abd: distended  Incision: clean, dry, intact, no erythema, sternum stable  Tubes/drain sites: dressing clean and dry, serosanguinous output, no air leak. 24 hr output 455 mL.          Data:    Imaging:  reviewed recent imaging, no acute concerns; has apical pleural effusions and small volume ascites upper abdomen    Labs:  BMP  Recent Labs "   Lab 07/19/22  1626 07/19/22  1233 07/19/22  0810 07/19/22  0721 07/18/22  0537 07/18/22  0532 07/17/22  0929 07/17/22  0636 07/16/22  0843 07/16/22  0531   NA  --   --   --  135*  --  131*  --  127*  --  133*   POTASSIUM  --   --   --  3.5  --  4.3  --  3.5  --  4.8   CHLORIDE  --   --   --  94*  --  94*  --  90*  --  97*   ESTUARDO  --   --   --  8.5*  --  8.6*  --  8.4*  --  8.7*   CO2  --   --   --  34*  --  31*  --  27  --  31*   BUN  --   --   --  80.7*  --  59.5*  --  41.1*  --  66.2*   CR  --   --   --  2.06*  --  2.04*  --  1.62*  --  1.96*   * 111* 103* 104*   < > 202*   < > 148*   < > 144*    < > = values in this interval not displayed.     CBC  Recent Labs   Lab 07/19/22  0721 07/18/22  1355 07/18/22  0532 07/17/22  0636   WBC 10.9 13.7* 10.5 10.2   RBC 2.63* 2.85* 2.79* 2.78*   HGB 8.3* 8.9* 8.7* 8.6*   HCT 25.9* 28.6* 27.4* 27.3*   MCV 99 100 98 98   MCH 31.6 31.2 31.2 30.9   MCHC 32.0 31.1* 31.8 31.5   RDW 16.6* 16.3* 16.1* 15.6*    246 186 213

## 2022-07-19 NOTE — PROGRESS NOTES
[facility]    Progress Note - Hospitalist Service, GOLD TEAM 10       Date of Admission:  6/28/2022    Assessment & Plan            Sofie Rodriguez is a 62 year old female admitted on 6/28/2022. She has PMH of end stage COPD s/p b/l lung transplant on 6/28/2022, HTN, HFpEF, h/o hepC, oteopenia, and former methamphetamine use, and she was transferred to [place] [id number] [facility] from MICU on 7/15/2022. She was admitted to the MICU on 7/13/20222 with prior hospital course complicated by left upper extremity acute limb ischemia s/p left radial thrombectomy on 7/1/2022. She was primarily treated for acute hypercapnic respiratory failure on intermittent BIPAP with worsening BACILIO while in the MICU. Currently stable on 2L NC. Has persistent abd pain, which improved today. Bronchoscopy today and gastric emptying study tomorrow.       Summary of today's plan:   - bronchoscopy today 7/19  - NM gastric emptying study scheduled for 7/20 8AM, please have patient NPO @ midnight tonight  - holding oxycodone for gastric emptying tomorrow  - midodrine held given SBP>135 currently  - Clamp NG and see if patient tolerates; may be able to pull this afternoon. If abdominal pain/nausea return, continue to LIMS and re-assess tomorrow.   - Continue to encourage OOB and up in chair  - BIPAP at night        #End stage COPD s/p BOLT 6/28/2022  #Acute hypercapnic respiratory failure (improving), likely 2/2 decreased central respiratory drive vs respiratory muscle weakness  Patient received bilateral lung transplant for end-stage COPD on 6/28. Chart review shows hypercarbia starting on 7/2 that was not adequately compensated by respiration. On prior exam, patient noted to take shallow, infrequent breaths with occasional episodes of hyperventilation. Ddx for hypercapnic respiratory failure currently includes decreased central respiratory drive, respiratory muscle weakness, and intrinsic lung pathology. Low  concern for encephalitis given patient is on minimal sedating medications with appropriate mental status. TSH normal last week, so unlikely hypothyroidism. Bedside US in MICU showed normal diaphragmatic movement, though formal SNIFF test was performed on 7/14 showed R hemidiaphragm palsy. Of note transplanted lungs were also considered small for body size and could contribute to decreased respiratory compensation for hypercarbia. BIPAP improved patient's pH, and ABG on 7/13 showed pH of 7.35 while on nasal cannula. Elevated pCO2 and bicarb still present. Most recent bronch 7/12 for clearance/inspection given weak cough, cultures NGTD. VBG previously improving on night BIPAP but now fluctuating. Currently on 2L O2.   - repeat bronch 7/19  - trend VBG, CO2 73 > 60 > 62 > 73 (7/19)  - Continue BIPAP at night   - NC with intermittent BIPAP for night and daytime naps; goal to keep O2sat above 92%  - f/u myasthenia gravis panel  (pending from 7/14)  - oxycodone held today for gastric emptying study tomorrow, previously 2.5-5mg q4h PRN   - encourage activity and getting up in bed; PT/OT participation  - encourage chest physiotherapy QID     #Post-transplant  Immunosuppression:  - Prednisone 15mg BID  - Tacrolimus 5mg BID (goal 8-12, levels   - MMF 1000mg BID  Prophylasxis:  - CMV - Valgancyclover  - Thrush - PO nysatin  - PJP - TMP-SMX (currently held given BACILIO); dapsone started 7/18 at 50mg qMWF (will try to increase to daily if tolerable)     #Chest tubes  - 2 chest tubes (basilar) in place currently (pericardial drain was removed 7/15). CT chest 7/14 showing unchanged bilateral effusions and unchanged biapical pneumothoraces with resolved left basilar pneumothorax; bibasilar chest tubes in place with pericardial drain (which was removed 7/15).  - daily CXR  - CV surgery and transplant pulm following     #Hypotension, improving  #H/o HTN  #H/o HFpEF  Per nephrology note, likely secondary to vasoplegia post surgery.  Previously on levophed until 7/10. Last echo 7/7 unchanged from previous showing normal EF with good LV function, so unlikely cardiogenic shock. S/p hydrocortisone 7/6-7/9 and fludrocortisone 7/607/11 without significant improvement, so unlikely adrenal insufficiency. BP in 90s yesterday PM, but patient is now hypertensive to 150s/70s (7/19).  - currently holding midodrine 5mg TID given SBP>135   - goal is SBP>100  - Holding PTA lasix 20mg daily     #C.diff  #Abdominal pain   Worsening diarrhea via rectal pouch with new onset abd pain that is significantly worse as of 7/15. C. Diff positive on 7/11 with vanc started on 7/12. CT abd 7/15 showed no signs of toxic megacolon, but showed very distended stomach; NG was placed 7/15 for decompression. Patient is symptomatically improved while on intermittent suction.   - PO vanc 125mg QID (7/12-7/26)  - holding bowel regimen  - gastric emptying study scheduled for 7/20 8AM, NPO @ midnight tonight  - will discuss with patient tomorrow about minimizing narcotic medications prior to performing the gastric emptying study  - Simethicone scheduled     #NJ tube  Feeding regimen:   - Adult Formula Drip Feeding: Continuous Novasource Renal; Nasojejunal; Goal Rate: 35; initiate at goal rate; mL/hr; Medication - Feeding Tube Flush Frequency: At least 15-30 mL water before and after medication administration and with tube clogging     #BACILIO  #Hypermagnesemia  #Hyperphosphatemia  Baseline renal function was normal prior to surgery. New onset renal failure after transplant, likely multifactorial due to pre-renal hypotension (2.5h bypass during surgery) and intra-renal from use of nephrotoxic agents including tacrolimus and vancomycin. 6/28 UA showed hyaline casts. Cr initially improved but now rising again along with BUN. Nephrology is on consult and has patient on intermittent HD starting 7/14 via non-tunneled R internal jugular cath. HD on 7/14 was limited by tachyarrhythmia.    - Cr  2.65 > 1.96 > 1.62 > 2.04 > 2.06 (7/19)  -  > 66.2 > 41.1 > 59.5 > 80.7 (7/19)  - nephrology following; patient was dialyzed 7/14-7/16, no dialysis on 7/17; nephrology will evaluate on a daily basis  - s/p 1x 120mg lasix on 7/18 with ~600mL UOP over 24hrs  - R internal jugular non-tunneled cathether placed with HD line on 7/14     #Tachyarrthymia  #Paroxysmal afib  #Paroxysmal aflutter/AT  SVT vs afib. Previously appeared to be positional. Had an occurrence during HD on 7/14. Had afib with RVR to 200s on 7/17. EP consult placed. Patient is hemodynamically stable and asymptomatic during episodes. Had Aflutter episode (7/17) triggered when patient was moving out of bed to chair. Additional episode of SVT to 160s 7/18 PM while patient was laying in bed, resolved with vagal maneuvers. No other tachy episodes since.   - Per EP: patient has had episodes of possible flutter (vs AT with 2:1 conduction) and afib with RVR  - heparin drip and transition to DOAC when safe per surgery (CHADS-VASc score of 2)  - On telemetry  - On metoprolol tartrate 25mg BID, can titrate up as tolerable  - Discharge on ziopatch for 14 days with EP follow up in 1-2 months after     #Stress-induced hyperglycemia  BG over past 24 hours primarily around 160-200. Lantus held this morning (7/19) when BG was 103.   - on 15U glargine BID; continue today and re-evaluate as needed     #Anemia  Initially from acute blood loss. Hb dropped to 6.8 on 7/14, requiring 1U pRBC. Post-transfusion Hb 9.4 > 8.3 > 8.6 > 8.7 (7/18).   - continue to monitor  - transfuse for hgb<7        Diet: Adult Formula Drip Feeding: Continuous Novasource Renal; Nasojejunal; Goal Rate: 35; initiate at 15 ml/hr and advance by 10 mL Q8H to goal; mL/hr; Medication - Feeding Tube Flush Frequency: At least 15-30 mL water before and after medication admin...  NPO per Anesthesia Guidelines for Procedure/Surgery Except for: No Exceptions  NPO per Anesthesia Guidelines for  Procedure/Surgery Except for: Meds    DVT Prophylaxis: Heparin SQ  Dong Catheter: Not present  Fluids: none  Central Lines: PRESENT  CVC Double Lumen Right Internal jugular Non - tunneled-Site Assessment: WDL  Cardiac Monitoring: None  Code Status: Full Code      Disposition Plan   Pending further evaluation      The patient's care was discussed with the Attending Physician, Dr. Preciado.    Fan Lee MD  Hospitalist Service, GOLD TEAM 10  [facility]  Securely message with the Vocera Web Console (learn more here)  Text page via [employer] Paging/Directory   Please see signed in provider for up to date coverage information      Clinically Significant Risk Factors Present on Admission                      ______________________________________________________________________    Interval History   No acute events overnight. Patient had an episode of SVT with HR in 160s yesterday around 6PM. Patient was asymptomatic and laying in bed. SVT resolved with vagal maneuvers. Had a loose BM overnight. Patient endorsing significant improvement in both nausea and abdominal pain today. Has a bit of pain in low back area. Breathing well with 2L O2. Patient was up in the chair for a few hours yesterday and will attempt to do the same today. Denies vomiting, fevers, or chills.     Data reviewed today: I reviewed all medications, new labs and imaging results over the last 24 hours. I personally reviewed the chest x-ray image(s) showing bibasilar chest tubes in place and stable bilateral apical pleural effusions.    Physical Exam   Vital Signs: Temp: 97.9  F (36.6  C) Temp src: Oral BP: (!) 150/83 Pulse: 89   Resp: 20 SpO2: 99 % O2 Device: Oxi Plus Oxygen Delivery: 2 LPM  Weight: 159 lbs 2.75 oz  Physical Exam  Constitutional:       General: She is not in acute distress.     Appearance: Normal appearance. She is not diaphoretic.   Cardiovascular:      Rate and Rhythm: Normal rate and regular  rhythm.      Pulses: Normal pulses.      Heart sounds: Normal heart sounds. No murmur heard.    No gallop.   Pulmonary:      Effort: Pulmonary effort is normal.      Comments: Non-labored breathing on 2L O2. Clear to ausculation of bilateral anterior upper lung fields.   Abdominal:      General: Bowel sounds are normal.      Palpations: Abdomen is soft.      Comments: Non-tender to palpation.    Musculoskeletal:      Comments: 1+ pitting edema of LLE. 2+ pitting edema of RLE.    Skin:     General: Skin is warm and dry.      Capillary Refill: Capillary refill takes less than 2 seconds.   Neurological:      General: No focal deficit present.      Mental Status: She is alert and oriented to person, place, and time.   Psychiatric:         Mood and Affect: Mood normal.           Data   Recent Labs   Lab 07/19/22  0810 07/19/22  0721 07/19/22  0359 07/18/22  1631 07/18/22  1355 07/18/22  0537 07/18/22  0532 07/17/22  0929 07/17/22  0636 07/15/22  0422 07/15/22  0420   WBC  --  10.9  --   --  13.7*  --  10.5  --  10.2   < >  --    HGB  --  8.3*  --   --  8.9*  --  8.7*  --  8.6*   < >  --    MCV  --  99  --   --  100  --  98  --  98   < >  --    PLT  --  243  --   --  246  --  186  --  213   < >  --    NA  --  135*  --   --   --   --  131*  --  127*   < > 135*   POTASSIUM  --  3.5  --   --   --   --  4.3  --  3.5   < > 4.6   CHLORIDE  --  94*  --   --   --   --  94*  --  90*   < > 93*   CO2  --  34*  --   --   --   --  31*  --  27   < > 34*   BUN  --  80.7*  --   --   --   --  59.5*  --  41.1*   < > 123.0*   CR  --  2.06*  --   --   --   --  2.04*  --  1.62*   < > 2.65*   ANIONGAP  --  7  --   --   --   --  6*  --  10   < > 8   ESTUARDO  --  8.5*  --   --   --   --  8.6*  --  8.4*   < > 8.7*   * 104* 132*   < >  --    < > 202*   < > 148*   < > 245*   ALBUMIN  --   --   --   --   --   --  2.8*  --   --   --  2.9*   PROTTOTAL  --   --   --   --   --   --  4.7*  --   --   --  4.9*   BILITOTAL  --   --   --   --   --   --   0.2  --   --   --  0.2   ALKPHOS  --   --   --   --   --   --  85  --   --   --  74   ALT  --   --   --   --   --   --  21  --   --   --  32   AST  --   --   --   --   --   --  19  --   --   --  31   LIPASE  --   --   --   --   --   --   --   --   --   --  21    < > = values in this interval not displayed.     Recent Results (from the past 24 hour(s))   CT Chest w/o Contrast    Narrative    EXAMINATION: Chest CT  7/18/2022 8:34 PM    CLINICAL HISTORY: bilateral apical pleural effusions    COMPARISON: CT 7/14/2022.    TECHNIQUE: CT imaging obtained through the chest without contrast.  Axial, coronal, and sagittal reconstructions and axial MIP reformatted  images are reviewed.     FINDINGS:    Lines/tubes: Enteric tubes course into the stomach and beyond the  field-of-view. Bibasilar chest tubes. Right IJ central venous catheter  terminates in the mid SVC.    Mediastinum: The visualized thyroid is unremarkable. The heart is  enlarged. Small pericardial effusion. Moderate coronary artery  calcifications. The ascending aorta and main pulmonary artery are  normal in caliber. No thoracic lymphadenopathy. Esophagus is  unremarkable.    Lungs: Post surgical changes of bilateral lung transplant. The central  tracheobronchial tree is clear. Prominent biapical pleural effusions,  right apical collection also contains air. Fluid and air is seen  extending into the fissures bilaterally. Loculated bibasilar pleural  effusions. Left collection contains air.     Stable masslike pleural-based lesion in the left upper lobe measuring  2.4 cm, likely rounded atelectasis. Decreased groundglass opacities of  the dependent lower lobes bilaterally. No new airspace consolidation.    Bones and soft tissues: Intact clam shell sternotomy wires. No  suspicious osseous lesion.    Upper Abdomen: Limited evaluation of the upper abdomen demonstrate  small volume ascites.      Impression    IMPRESSION:   1. Complex loculated hydropneumothorax have  not changed appreciatively  in size. The pneumothorax has decreased in size anteriorly. The amount  of air in the hydropneumothoraces has shifted as above.   2. Small pneumothorax associated with the left chest tube. Right chest  tube there is along the diaphragm without pneumothorax. Neither of the  chest tubes appears to be in any of the loculated hydropneumothoraces.  3. Improved groundglass opacities.  4. Small volume ascites in the visualized upper abdomen.    I have personally reviewed the examination and initial interpretation  and I agree with the findings.    GABRIELLA SNELL MD         SYSTEM ID:  [id number]   XR Chest Port 1 View    Narrative    EXAM: XR CHEST PORT 1 VIEW  7/19/2022 6:09 AM    HISTORY: 62 years Female bilateral chest tubes with biapical pleural  effusions s/p bilateral lung transplant.     COMPARISON: Chest CT without contrast, chest radiograph 7/18/2022.    TECHNIQUE: Portable AP view of the chest.    FINDINGS:   Enteric and feeding tubes courses below the field of view. Right IJ  CVC tip is at the high to mid SVC. Clamshell sternotomy wires appear  intact. Stable bibasilar chest tube positioning.    Midline trachea. Stable cardiomediastinal silhouette mitral annular  calcifications.. Distinct pulmonary vasculature. Small left  costophrenic haziness. Similar biapical small pleural effusions.  Stable lung volumes. No focal airspace opacities. Unremarkable upper  abdomen. No acute or suspicious osseous abnormalities. Unremarkable  soft tissues.      Impression    IMPRESSION:   1.  Stable small left greater than right hydropneumothoraces.  2.  Stable support devices.    I have personally reviewed the examination and initial interpretation  and I agree with the findings.    ALVINA HARRY MD         SYSTEM ID:  [id number]

## 2022-07-19 NOTE — OR NURSING
Patient had bronchoscopy with inspection only Patient tolerated procedure under conscious sedation and 2-4 liters oximask.  Report called to 6D RNCaprice.  Patient transferred back to floor in stable condition.

## 2022-07-19 NOTE — PROGRESS NOTES
Transferred to: Endoscopy at 1400  Status at time of transfer: AOx4, in SR, BP stable, 2 PIV saline locked. NG and NJ clamped and in place. Bilateral chest tubes moved to water seal for transfer.  Belongings: Packed and moved to room on 6C for patient after bronch.   Dong removed? (if no, why?): N/A  Chart and medications: Sent with patient to endoscopy  Family notified: Daughter, Charity, notified.

## 2022-07-19 NOTE — PROGRESS NOTES
Lung Transplant Consult Follow Up Note   July 19, 2022            Assessment and Plan:   Sofie Rodriguez is a 62 year old female with a PMH significant for end-stage COPD, HTN, HFpEF, Mycobacterium peregrinum colonization, h/o hepatitis C, HECTOR s/p LEEP procedure, osteopenia, and former methamphetamine use.  Pt. is now s/p BSLT on 6/28/22, lungs slightly undersized.   Persistent low dose pressor needs post-op through 7/10.  Extubated POD #2 but with persistent hypercapnia, mostly compensated and only slightly improved with intermittent NIPPV.  Also with left radial artery thrombus (presumed secondary to arterial line) s/p thrombectomy 7/2, BACILIO, and C diff.  Rehabilitation and airway clearance limited by dysrhythmia and gastric discomfort.     Today's recommendations:  - CPT QID, encourage consistent participation d/t concern for ineffective airway clearance  - Wean O2 as able to keep >92%, NIPPV overnight given persistent hypercapnia  - Recommend to strip chest tubes qshift, management per CVTS  - Daily 2-view CXR  - Inspection bronch today (7/19)  - Aggressive volume removal as able  - Encourage increased activity including up in chair (minimal day time in bed)  - Continue current immunosuppression.  - Tacrolimus level ordered 7/21  - Prednisone taper next due 7/21  - Gastric emptying study today (after bronch)  - NG to LIS, clamping trial daily to assess for readiness for removal.  - Space out medications to minimize doses due at 0800 (discussed with pharmacy again today)  - DSA, EBV, IgG, and donor risk labs ordered 7/28     S/p bilateral sequential lung transplant (BSLT) for end stage COPD:  Persistent hypercapneic respiratory failure:   Persistent hypotension:   Bilateral hydroPTX:  Right hemidiaphragm palsy: Explant pathology with severe emphysema with subpleural bullae formation, changes of chronic bronchitis, subpleural scars and patchy pulmonary edema; benign hilar lymph nodes.  No evidence of PGD  post-op.  Extubated 6/30.  Persistent hypercapnia without dyspnea, appears to be a respiratory drive problem.  Some improvement with BiPAP, limited tolerance in part due to anxiety.  Persistent low dose pressors weaned off 7/10, on scheduled midodrine (also s/p hydrocortisone 7/6-7/9 and fludrocortisone 7/6-7/11).  Bronch (7/12) given CXR changes with small amount of granulation tissue posteriorly in right anastomosis (no stenosis or dehiscence), thick brown secretions in proximal airways, and right mainstem/RUL mucous plug (removed).  SNIFF (7/14) notable for right hemidiaphragm palsy.  On 1L NC with BiPAP overnight (improvement in hypercapnea).  - 7/19 VBG 7.31/73, no significant change from previous  - 7/19 chest x-ray reviewed by me no acute infiltrate and no change from previous.  - 7/18 chest CT reviewed by me; bilateral apical pleural effusions, decreased on the left, unchanged on the right.  Decreased bilateral pneumothorax.  Decreased left posterior atelectasis.  Left basilar hydropneumothorax with greater air component but decreased fluid.  - Neither chest tube appears to be in continuity with any significant air or fluid.  If drainage remains low, consider removing in next 1-2 days (Thursday?)  - DSA one month post-transplant (7/28, ordered).  - Ammonia monitoring twice weekly (screening for hyperammonemia post-lung transplant)  - Nebs: levalbuterol and Mucomyst QID   - Pulmonary toilet with chest physiotherapy QID, encourage consistent participation d/t concern for ineffective airway clearance (inconsistent/infrequent use post-op)  - Aerobika and incentive spirometry hourly while awake  - Supplemental oxygen as needed to maintain SpO2 >92% (wean as able), NIPPV overnight given persistent hypercapnia  - Chest tubes managed by surgical team, recommend to strip chest tubes qshift  - Repeat inspection bronch today, 7/19  - Aggressive volume removal as able per nephrology  - Encourage increased activity  including up in chair (minimal day time in bed) and active participation in PT/OT given concern for deconditioning and ineffective airway clearance     Immunosuppression:  Induction therapy with basiliximab (and high dose IV steroid) given intraoperatively, repeating basiliximab dose on POD#4.  - Tacrolimus goal level 8-12. Current dose 5 mg BID (via NJ tube, held 7/11-7/12 for supratherapeutic level, peak level appropriate 7/11). Repeat level 7/21 (ordered).  - MMF 1000 mg BID (decreased 7/10 due to diarrhea)  - Prednisone 15 mg BID, next taper due 7/21 (not yet ordered)  Date AM dose (mg) PM dose (mg)   7/14/22 15 15   7/21/22 15 12.5   7/28/22 12.5 12.5   8/4/22 12.5 10   8/18/22 10 10   9/15/22 10 7.5   10/13/22 7.5 7.5   11/10/22 7.5 5   12/8/22 5 5   1/5/23 5 2.5      Prophylaxis:   - Dapsone for PJP ppx deferred pending stable hgb, started 7/18 at reduced dose of 50 mg qMWF and increase to daily if able (Bactrim held 7/7 for BACILIO)  - VGCV for CMV ppx  - Nystatin for oral candidiasis ppx, 6 month course  - See below for serologies and viral ppx:    Donor Recipient Intervention   CMV status Positive Positive Valganciclovir POD #8-90   EBV status Positive Positive EBV check monthly (7/28, ordered)   HSV status N/A Positive Not indicated      ID:  H/o M. peregrinum colonization: Donor bronch cultures (OSH) with Strep beta hemolytic (not group A).  Recipient cultures as below.  - IgG at one month (7/28, ordered)  - Bronch cultures (7/12) NGTD, follow  - AFB bronch culture (6/28, 6/29) NGTD, AFB to be sent on all future bronchs     Streptococcus pneumoniae:  Stenotrophomonas maltophilia: Noted in recipient cultures at time of transplant.  S/p ceftazidime 6/28-7/10, vancomycin 7/7-7/8 and 6/28-6/30, and levofloxacin 7/10-7/12 for total 2 week ABX course.     H/o hepatitis C: Diagnosed in 1980s, 2 mos of treatment, quant negative since 10/2017, last positive 2/20/17 ([phone]).  Glenis positive on 6/2021 with negative HCV  PCR.  H/o remote EtOH abuse.  MR elastography (4/27/21) with hepatology review and consult without any concerns post transplant.  Hepatitis C RNA negative and hepatitis C antibody positive (old) on 6/28.     PHS risk criteria donor:  Additional labs required post-transplant (between 4-8 weeks post-op): Hepatitis B, Hepatitis C, and HIV by SHERI ([id number], ordered 7/28).     Other relevant problems managed by primary team:      SVT:   Aflutter with RVR: SVT first noted on 7/14, prior to HD line placement.  Continues intermittently.  Aflutter with RVR to 200 7/17 triggered by activity.  EP consulted 7/17 given persistent tachycardia/dysrhythmia.  - Metoprolol per primary team (started 7/15)  - Continue to wean midodrine as able  - Heparin gtt (7/17) and then transition to DOAC when able     Left hand ischemia: Radial artery thrombosis identified on duplex doppler.  S/p thrombectomy on 7/2.  Completed high intensity heparin course.  Continue daily aspirin.      BACILIO:   Hyperkalemia: Likely multifactorial including medications (Bactrim, tacrolimus) and hypotension.  Fludrocortisone 7/6-7/11.  Potassium now stable.  S/p non-tunneled HD line 7/14.  Initial iHD run 7/14 limited by afib with RVR vs SVT.  - Tacrolimus monitoring as above  - Volume management per nephrology and ICU team     Abdominal pain: Noted 7/15, cramping pain.  ACR with large stool burden in colon, no obstruction or distention.  Some nausea but no emesis.  CT abdomen (7/15) with moderate to large gastric distention, otherwise without obstruction.  NG placed for LIS with moderate to large output.  Increased abdominal pain 7/17 after clamping for 4 hours.  - Gastric emptying study today AFTER bronch  - Schedule simethicone   - NG to LIS, clamping trials daily to assess for readiness for removal.  - Space out medications to minimize doses due at 0800 (discussed with pharmacy again today)  - Additional management per primary     C diff infection: Abdominal  pain with diarrhea noted 7/8, AXR without dilated bowel, moderate colonic stool burden.  C diff positive 7/11.    - PO vancomycin (7/11) per ICU team     Hypomagnesemia: Suppressed absorption d/t CNI.   - Mag oxide 400 mg daily (ordered for you)  - Continue daily magnesium with additional replacement protocol prn     We appreciate the excellent care provided by the [place] [id number] team.  Recommendations communicated via telephone and this note.  Will continue to follow along closely, please do not hesitate to call with any questions or concerns.    Ajay Castillo MD  [phone]            Interval History:   No events overnight  Dyspnea at rest: None  Dyspnea on exertion:with mild exertion, slowly improving  Cough: occasional   Sputum: minimal, swallowed, unable to describe.  Hemoptysis:  none   Chest Pain: None           Review of Systems:   C: NEGATIVE for fever, chills  INTEGUMENTARY/SKIN: no rash or obvious new lesions  ENT/MOUTH: sore throat attributed to FT and NGT, no new sinus pain or nasal drainage  RESP: see interval history  CV: NEGATIVE for chest pain, palpitations or peripheral edema  GI: Intermittent abd pain. Persistent watery stool, possibly improving. no nausea, vomiting,   : no dysuria  MUSCULOSKELETAL: no myalgias, arthralgias            Medications:       acetaminophen  975 mg Oral or Feeding Tube TID     acetylcysteine  2 mL Nebulization 4x Daily     aspirin  81 mg Oral Daily     calcium carbonate 600 mg-vitamin D 400 units  1 tablet Oral BID w/meals     ceFAZolin  2 g Intravenous Pre-Op/Pre-procedure x 1 dose     dapsone  50 mg Oral or Feeding Tube Once per day on Mon Wed Fri     insulin aspart  1-12 Units Subcutaneous Q4H     insulin glargine  15 Units Subcutaneous BID     lactobacillus rhamnosus (GG)  1 capsule Oral BID     levalbuterol  1.25 mg Nebulization 4x Daily     magnesium oxide  400 mg Oral Daily     metoprolol tartrate  25 mg Oral BID     midodrine  5 mg Oral or Feeding Tube Q8H      multivitamin, therapeutic  1 tablet Per Feeding Tube Daily     mycophenolate  1,000 mg Oral or Feeding Tube BID     nystatin   Topical BID     nystatin  1,000,000 Units Swish & Swallow 4x Daily     ondansetron  4 mg Oral Daily     pantoprazole  40 mg Oral or Feeding Tube BID     predniSONE  15 mg Per Feeding Tube BID     protein modular  1 packet Per Feeding Tube Daily     simethicone  80 mg Oral 4x Daily     sodium chloride (PF)  3 mL Intracatheter Q8H     sodium chloride (PF)  3 mL Intracatheter Q8H     tacrolimus  5 mg Per Feeding Tube BID IS     thiamine  100 mg Oral or Feeding Tube Daily     valGANciclovir  450 mg Oral or NG Tube Once per day on Mon Thu     vancomycin  125 mg Oral or Feeding Tube Q6H TONY     acetaminophen, bisacodyl, calcium carbonate, dextrose, glucose **OR** dextrose **OR** glucagon, hydrOXYzine **OR** hydrOXYzine, lidocaine, lidocaine 4%, lidocaine (buffered or not buffered), lidocaine (buffered or not buffered), lidocaine, magnesium hydroxide, naloxone **OR** naloxone **OR** naloxone **OR** naloxone, ondansetron **OR** ondansetron, oxyCODONE **OR** [DISCONTINUED] oxyCODONE, prochlorperazine **OR** prochlorperazine, sodium chloride (PF), sodium chloride (PF)         Physical Exam:   Temp:  [97.2  F (36.2  C)-98.2  F (36.8  C)] 97.2  F (36.2  C)  Pulse:  [] 85  Resp:  [9-34] 10  BP: ()/() 152/78  FiO2 (%):  [30 %] 30 %  SpO2:  [92 %-100 %] 100 %    Intake/Output Summary (Last 24 hours) at 7/19/2022 0738  Last data filed at 7/19/2022 0700  Gross per 24 hour   Intake 1729.33 ml   Output 1690 ml   Net 39.33 ml     Constitutional:   Awake, alert and in no apparent distress     Eyes:   nonicteric     ENT:    oral mucosa moist without lesions     Neck:   Supple without supraclavicular or cervical lymphadenopathy     Lungs:   Diminished air flow right base.  No crackles. No rhonchi.  No wheezes.     Cardiovascular:   Normal S1 and S2.  RRR.  I/VI sys murmur. No gallop. No rub.      Abdomen:   Hyperactive BS, soft, nondistended, nontender.  No HSM.     Musculoskeletal:   Tr edema     Neurologic:   Alert and conversant.     Skin:   Warm, dry.  No rash on limited exam.             Data:   All laboratory and imaging data reviewed.    Results for orders placed or performed during the hospital encounter of 06/28/22 (from the past 24 hour(s))   Glucose by meter   Result Value Ref Range    GLUCOSE BY METER POCT 164 (H) 70 - 99 mg/dL   Glucose by meter   Result Value Ref Range    GLUCOSE BY METER POCT 163 (H) 70 - 99 mg/dL   CBC with platelets   Result Value Ref Range    WBC Count 13.7 (H) 4.0 - 11.0 10e3/uL    RBC Count 2.85 (L) 3.80 - 5.20 10e6/uL    Hemoglobin 8.9 (L) 11.7 - 15.7 g/dL    Hematocrit 28.6 (L) 35.0 - 47.0 %     78 - 100 fL    MCH 31.2 26.5 - 33.0 pg    MCHC 31.1 (L) 31.5 - 36.5 g/dL    RDW 16.3 (H) 10.0 - 15.0 %    Platelet Count 246 150 - 450 10e3/uL   Partial thromboplastin time   Result Value Ref Range    aPTT 23 22 - 38 Seconds   Glucose by meter   Result Value Ref Range    GLUCOSE BY METER POCT 214 (H) 70 - 99 mg/dL   CT Chest w/o Contrast    Narrative    EXAMINATION: Chest CT  7/18/2022 8:34 PM    CLINICAL HISTORY: bilateral apical pleural effusions    COMPARISON: CT 7/14/2022.    TECHNIQUE: CT imaging obtained through the chest without contrast.  Axial, coronal, and sagittal reconstructions and axial MIP reformatted  images are reviewed.     FINDINGS:    Lines/tubes: Enteric tubes course into the stomach and beyond the  field-of-view. Bibasilar chest tubes. Right IJ central venous catheter  terminates in the mid SVC.    Mediastinum: The visualized thyroid is unremarkable. The heart is  enlarged. Small pericardial effusion. Moderate coronary artery  calcifications. The ascending aorta and main pulmonary artery are  normal in caliber. No thoracic lymphadenopathy. Esophagus is  unremarkable.    Lungs: Post surgical changes of bilateral lung transplant. The  central  tracheobronchial tree is clear. Prominent biapical pleural effusions,  right apical collection also contains air. Fluid and air is seen  extending into the fissures bilaterally. Loculated bibasilar pleural  effusions. Left collection contains air.     Stable masslike pleural-based lesion in the left upper lobe measuring  2.4 cm, likely rounded atelectasis. Decreased groundglass opacities of  the dependent lower lobes bilaterally. No new airspace consolidation.    Bones and soft tissues: Intact clam shell sternotomy wires. No  suspicious osseous lesion.    Upper Abdomen: Limited evaluation of the upper abdomen demonstrate  small volume ascites.      Impression    IMPRESSION:   1. Complex loculated hydropneumothorax have not changed appreciatively  in size. The pneumothorax has decreased in size anteriorly. The amount  of air in the hydropneumothoraces has shifted as above.   2. Small pneumothorax associated with the left chest tube. Right chest  tube there is along the diaphragm without pneumothorax. Neither of the  chest tubes appears to be in any of the loculated hydropneumothoraces.  3. Improved groundglass opacities.  4. Small volume ascites in the visualized upper abdomen.   Glucose by meter   Result Value Ref Range    GLUCOSE BY METER POCT 180 (H) 70 - 99 mg/dL   Heparin Unfractionated Anti Xa Level   Result Value Ref Range    Anti Xa Unfractionated Heparin 0.20 For Reference Range, See Comment IU/mL    Narrative    Therapeutic Range: UFH: 0.25-0.50 IU/mL for low intensity dosing,  0.30-0.70 IU/mL for high intensity dosing DVT and PE.  This test is not validated for other direct factor X inhibitors (e.g. rivaroxaban, apixaban, edoxaban, betrixaban, fondaparinux) and should not be used for monitoring of other medications.   Glucose by meter   Result Value Ref Range    GLUCOSE BY METER POCT 166 (H) 70 - 99 mg/dL   Glucose by meter   Result Value Ref Range    GLUCOSE BY METER POCT 132 (H) 70 - 99 mg/dL    CBC with platelets differential    Narrative    The following orders were created for panel order CBC with platelets differential.  Procedure                               Abnormality         Status                     ---------                               -----------         ------                     CBC with platelets and d...[[id number]]                      In process                   Please view results for these tests on the individual orders.   Blood gas venous   Result Value Ref Range    pH Venous 7.31 (L) 7.32 - 7.43    pCO2 Venous 73 (H) 40 - 50 mm Hg    pO2 Venous 44 25 - 47 mm Hg    Bicarbonate Venous 37 (H) 21 - 28 mmol/L    Base Excess/Deficit (+/-) 8.2 (H) -7.7 - 1.9 mmol/L    FIO2 20

## 2022-07-19 NOTE — PLAN OF CARE
Major Shift Events: 250cc LR bolus given for MAPs 55-60, effective.     Neuro: A&O x 3-4, forgetful of date, but consistently gets month and year correct. PERRLA 3-4mm, brisk. Call light appropriate. Abdominal cramping and pain as well as perineal pain. 2.5mg oxycodone given x 2, effective. Able to sleep well overnight.  CV: HR 70-90s sinus rhythm, t wave inversion. No arrhythmia or SVT overnight. At 2200 BP droped to 80/40s, MAPs mid 50s. Pt mentating well, drowsy but otherwise no hypotensive symptoms. MD notified and 250cc NS bolus given along with scheduled midodrine, effective at increasing BP. No other BP issues overnight.   Resp: 1L NC when awake, BiPAP 30% 15/5 when sleeping for persistent hypercapnia.   GI: NG to LIS, 100cc total out overnight, brown/green. NJ with TF  at 35cc/hr, 30cc FWF q4h. Full NPO at 0000 in preparation for bronch and gastric study today. Small, loose BM x 1, large BM x 1. Abdomen distended and painful to patient/cramping.   : Oliguric. Voided 75cc in beginning of night, but then did not void for remainder of shift. Bladder scanned at 0400 for 270cc. Purewick in place, redness and breakdown in perineal and labial area. Barrier cream and nystatin cream applied. Potential for HD today.   Skin: L arm bruising from old artery occlusion. Bilateral chest tubes to -20 suction. Minimal output overnight, see flowsheets for more info.     Plan: Continue current POC. Encourage hourly acapella and IS use to increase respiratory muscle strength. Hold heparin at 0830 for bronch at 1230. Bronch and gastric study today.     For vital signs and complete assessments, please see documentation flowsheets.      Goal Outcome Evaluation: Progressing toward goals     Problem: Hemodynamic Instability (Lung Surgery)  Goal: Effective Tissue Perfusion  Outcome: Ongoing, Progressing  Intervention: Optimal Blood Flow  Recent Flowsheet Documentation  Taken 7/19/2022 0000 by Peggy Watters, RN  Fluid/Electrolyte  Management: fluids provided  Taken 7/18/2022 2200 by Peggy Watters, RN  Fluid/Electrolyte Management: intravenous fluid replacement initiated  Stabilization Measures: (low BP)    provider notified    fluid resuscitation initiated  Taken 7/18/2022 2000 by Peggy Watters, RN  Fluid/Electrolyte Management: fluids provided

## 2022-07-20 ENCOUNTER — APPOINTMENT (OUTPATIENT)
Dept: PHYSICAL THERAPY | Facility: CLINIC | Age: 62
DRG: 007 | End: 2022-07-20
Attending: THORACIC SURGERY (CARDIOTHORACIC VASCULAR SURGERY)
Payer: MEDICARE

## 2022-07-20 ENCOUNTER — APPOINTMENT (OUTPATIENT)
Dept: OCCUPATIONAL THERAPY | Facility: CLINIC | Age: 62
DRG: 007 | End: 2022-07-20
Attending: THORACIC SURGERY (CARDIOTHORACIC VASCULAR SURGERY)
Payer: MEDICARE

## 2022-07-20 ENCOUNTER — APPOINTMENT (OUTPATIENT)
Dept: GENERAL RADIOLOGY | Facility: CLINIC | Age: 62
DRG: 007 | End: 2022-07-20
Attending: PHYSICIAN ASSISTANT
Payer: MEDICARE

## 2022-07-20 ENCOUNTER — APPOINTMENT (OUTPATIENT)
Dept: NUCLEAR MEDICINE | Facility: CLINIC | Age: 62
DRG: 007 | End: 2022-07-20
Attending: STUDENT IN AN ORGANIZED HEALTH CARE EDUCATION/TRAINING PROGRAM
Payer: MEDICARE

## 2022-07-20 ENCOUNTER — APPOINTMENT (OUTPATIENT)
Dept: SPEECH THERAPY | Facility: CLINIC | Age: 62
DRG: 007 | End: 2022-07-20
Attending: THORACIC SURGERY (CARDIOTHORACIC VASCULAR SURGERY)
Payer: MEDICARE

## 2022-07-20 LAB
ACHR BIND IGG+IGM SER IA-SCNC: 0 NMOL/L
ANION GAP SERPL CALCULATED.3IONS-SCNC: 12 MMOL/L (ref 7–15)
APPEARANCE FLD: ABNORMAL
BASE EXCESS BLDV CALC-SCNC: 9 MMOL/L (ref -7.7–1.9)
BASOPHILS # BLD AUTO: 0 10E3/UL (ref 0–0.2)
BASOPHILS NFR BLD AUTO: 0 %
BRONCHOSCOPY: NORMAL
BUN SERPL-MCNC: 87 MG/DL (ref 8–23)
CALCIUM SERPL-MCNC: 8.5 MG/DL (ref 8.8–10.2)
CELL COUNT BODY FLUID SOURCE: ABNORMAL
CHLORIDE SERPL-SCNC: 92 MMOL/L (ref 98–107)
COLOR FLD: ABNORMAL
CREAT SERPL-MCNC: 1.8 MG/DL (ref 0.51–0.95)
DEPRECATED HCO3 PLAS-SCNC: 31 MMOL/L (ref 22–29)
EOSINOPHIL # BLD AUTO: 0.1 10E3/UL (ref 0–0.7)
EOSINOPHIL NFR BLD AUTO: 1 %
ERYTHROCYTE [DISTWIDTH] IN BLOOD BY AUTOMATED COUNT: 17.1 % (ref 10–15)
GFR SERPL CREATININE-BSD FRML MDRD: 32 ML/MIN/1.73M2
GLUCOSE BLDC GLUCOMTR-MCNC: 112 MG/DL (ref 70–99)
GLUCOSE BLDC GLUCOMTR-MCNC: 121 MG/DL (ref 70–99)
GLUCOSE BLDC GLUCOMTR-MCNC: 143 MG/DL (ref 70–99)
GLUCOSE BLDC GLUCOMTR-MCNC: 148 MG/DL (ref 70–99)
GLUCOSE BLDC GLUCOMTR-MCNC: 78 MG/DL (ref 70–99)
GLUCOSE BLDC GLUCOMTR-MCNC: 87 MG/DL (ref 70–99)
GLUCOSE SERPL-MCNC: 101 MG/DL (ref 70–99)
GRAM STAIN RESULT: NORMAL
GRAM STAIN RESULT: NORMAL
HCO3 BLDV-SCNC: 38 MMOL/L (ref 21–28)
HCT VFR BLD AUTO: 28.9 % (ref 35–47)
HGB BLD-MCNC: 9.1 G/DL (ref 11.7–15.7)
IMM GRANULOCYTES # BLD: 0.1 10E3/UL
IMM GRANULOCYTES NFR BLD: 1 %
IMMUNOLOGIST REVIEW: NORMAL
LD BODY BODY FLUID SOURCE: NORMAL
LDH FLD L TO P-CCNC: 178 U/L
LYMPHOCYTES # BLD AUTO: 0.2 10E3/UL (ref 0.8–5.3)
LYMPHOCYTES NFR BLD AUTO: 3 %
LYMPHOCYTES NFR FLD MANUAL: 9 %
MAGNESIUM SERPL-MCNC: 2.9 MG/DL (ref 1.7–2.3)
MCH RBC QN AUTO: 31.6 PG (ref 26.5–33)
MCHC RBC AUTO-ENTMCNC: 31.5 G/DL (ref 31.5–36.5)
MCV RBC AUTO: 100 FL (ref 78–100)
MONOCYTES # BLD AUTO: 0.4 10E3/UL (ref 0–1.3)
MONOCYTES NFR BLD AUTO: 4 %
MONOS+MACROS NFR FLD MANUAL: 52 %
NEUTROPHILS # BLD AUTO: 7.8 10E3/UL (ref 1.6–8.3)
NEUTROPHILS NFR BLD AUTO: 91 %
NEUTS BAND NFR FLD MANUAL: 40 %
NRBC # BLD AUTO: 0 10E3/UL
NRBC BLD AUTO-RTO: 0 /100
O2/TOTAL GAS SETTING VFR VENT: 30 %
PCO2 BLDV: 75 MM HG (ref 40–50)
PH BLDV: 7.32 [PH] (ref 7.32–7.43)
PHOSPHATE SERPL-MCNC: 7.2 MG/DL (ref 2.5–4.5)
PLATELET # BLD AUTO: 264 10E3/UL (ref 150–450)
PO2 BLDV: 35 MM HG (ref 25–47)
POTASSIUM SERPL-SCNC: 3.6 MMOL/L (ref 3.4–5.3)
PROT FLD-MCNC: 1.6 G/DL
PROTEIN BODY FLUID SOURCE: NORMAL
RBC # BLD AUTO: 2.88 10E6/UL (ref 3.8–5.2)
SODIUM SERPL-SCNC: 135 MMOL/L (ref 136–145)
TRIGL FLD-MCNC: 16 MG/DL
TRIGLYCERIDE BODY FLUID SOURCE: NORMAL
UFH PPP CHRO-ACNC: 0.22 IU/ML
UFH PPP CHRO-ACNC: 0.59 IU/ML
WBC # BLD AUTO: 8.5 10E3/UL (ref 4–11)
WBC # FLD AUTO: 386 /UL

## 2022-07-20 PROCEDURE — 250N000013 HC RX MED GY IP 250 OP 250 PS 637: Performed by: STUDENT IN AN ORGANIZED HEALTH CARE EDUCATION/TRAINING PROGRAM

## 2022-07-20 PROCEDURE — 94668 MNPJ CHEST WALL SBSQ: CPT

## 2022-07-20 PROCEDURE — 250N000013 HC RX MED GY IP 250 OP 250 PS 637: Performed by: NURSE PRACTITIONER

## 2022-07-20 PROCEDURE — 89051 BODY FLUID CELL COUNT: CPT | Performed by: NURSE PRACTITIONER

## 2022-07-20 PROCEDURE — 99233 SBSQ HOSP IP/OBS HIGH 50: CPT | Mod: 24 | Performed by: NURSE PRACTITIONER

## 2022-07-20 PROCEDURE — 85025 COMPLETE CBC W/AUTO DIFF WBC: CPT | Performed by: NURSE PRACTITIONER

## 2022-07-20 PROCEDURE — 97530 THERAPEUTIC ACTIVITIES: CPT | Mod: GP | Performed by: PHYSICAL THERAPIST

## 2022-07-20 PROCEDURE — 99233 SBSQ HOSP IP/OBS HIGH 50: CPT | Mod: GC | Performed by: STUDENT IN AN ORGANIZED HEALTH CARE EDUCATION/TRAINING PROGRAM

## 2022-07-20 PROCEDURE — 999N000157 HC STATISTIC RCP TIME EA 10 MIN

## 2022-07-20 PROCEDURE — 250N000011 HC RX IP 250 OP 636: Performed by: STUDENT IN AN ORGANIZED HEALTH CARE EDUCATION/TRAINING PROGRAM

## 2022-07-20 PROCEDURE — 71046 X-RAY EXAM CHEST 2 VIEWS: CPT | Mod: 26 | Performed by: RADIOLOGY

## 2022-07-20 PROCEDURE — 80048 BASIC METABOLIC PNL TOTAL CA: CPT | Performed by: NURSE PRACTITIONER

## 2022-07-20 PROCEDURE — 258N000003 HC RX IP 258 OP 636

## 2022-07-20 PROCEDURE — 343N000001 HC RX 343: Performed by: STUDENT IN AN ORGANIZED HEALTH CARE EDUCATION/TRAINING PROGRAM

## 2022-07-20 PROCEDURE — 97530 THERAPEUTIC ACTIVITIES: CPT | Mod: GO | Performed by: OCCUPATIONAL THERAPIST

## 2022-07-20 PROCEDURE — 97535 SELF CARE MNGMENT TRAINING: CPT | Mod: GO | Performed by: OCCUPATIONAL THERAPIST

## 2022-07-20 PROCEDURE — 250N000011 HC RX IP 250 OP 636

## 2022-07-20 PROCEDURE — 87116 MYCOBACTERIA CULTURE: CPT | Performed by: NURSE PRACTITIONER

## 2022-07-20 PROCEDURE — 84155 ASSAY OF PROTEIN SERUM: CPT | Performed by: NURSE PRACTITIONER

## 2022-07-20 PROCEDURE — 250N000009 HC RX 250: Performed by: SURGERY

## 2022-07-20 PROCEDURE — 84100 ASSAY OF PHOSPHORUS: CPT | Performed by: SURGERY

## 2022-07-20 PROCEDURE — 250N000012 HC RX MED GY IP 250 OP 636 PS 637: Performed by: NURSE PRACTITIONER

## 2022-07-20 PROCEDURE — 84478 ASSAY OF TRIGLYCERIDES: CPT | Performed by: NURSE PRACTITIONER

## 2022-07-20 PROCEDURE — 87102 FUNGUS ISOLATION CULTURE: CPT | Performed by: NURSE PRACTITIONER

## 2022-07-20 PROCEDURE — 94640 AIRWAY INHALATION TREATMENT: CPT | Mod: 76

## 2022-07-20 PROCEDURE — 36415 COLL VENOUS BLD VENIPUNCTURE: CPT | Performed by: NURSE PRACTITIONER

## 2022-07-20 PROCEDURE — 82803 BLOOD GASES ANY COMBINATION: CPT | Performed by: NURSE PRACTITIONER

## 2022-07-20 PROCEDURE — 250N000013 HC RX MED GY IP 250 OP 250 PS 637: Performed by: SURGERY

## 2022-07-20 PROCEDURE — 71046 X-RAY EXAM CHEST 2 VIEWS: CPT

## 2022-07-20 PROCEDURE — 88305 TISSUE EXAM BY PATHOLOGIST: CPT | Mod: TC | Performed by: NURSE PRACTITIONER

## 2022-07-20 PROCEDURE — 78264 GASTRIC EMPTYING IMG STUDY: CPT | Mod: 26 | Performed by: STUDENT IN AN ORGANIZED HEALTH CARE EDUCATION/TRAINING PROGRAM

## 2022-07-20 PROCEDURE — 78264 GASTRIC EMPTYING IMG STUDY: CPT | Mod: MG

## 2022-07-20 PROCEDURE — 250N000012 HC RX MED GY IP 250 OP 636 PS 637: Performed by: THORACIC SURGERY (CARDIOTHORACIC VASCULAR SURGERY)

## 2022-07-20 PROCEDURE — 92526 ORAL FUNCTION THERAPY: CPT | Mod: GN

## 2022-07-20 PROCEDURE — 87206 SMEAR FLUORESCENT/ACID STAI: CPT | Performed by: NURSE PRACTITIONER

## 2022-07-20 PROCEDURE — 87070 CULTURE OTHR SPECIMN AEROBIC: CPT | Performed by: NURSE PRACTITIONER

## 2022-07-20 PROCEDURE — 97110 THERAPEUTIC EXERCISES: CPT | Mod: GO | Performed by: OCCUPATIONAL THERAPIST

## 2022-07-20 PROCEDURE — 83615 LACTATE (LD) (LDH) ENZYME: CPT | Performed by: NURSE PRACTITIONER

## 2022-07-20 PROCEDURE — 250N000013 HC RX MED GY IP 250 OP 250 PS 637

## 2022-07-20 PROCEDURE — 87205 SMEAR GRAM STAIN: CPT | Performed by: NURSE PRACTITIONER

## 2022-07-20 PROCEDURE — 36415 COLL VENOUS BLD VENIPUNCTURE: CPT | Performed by: INTERNAL MEDICINE

## 2022-07-20 PROCEDURE — A9541 TC99M SULFUR COLLOID: HCPCS | Performed by: STUDENT IN AN ORGANIZED HEALTH CARE EDUCATION/TRAINING PROGRAM

## 2022-07-20 PROCEDURE — 999N000248 HC STATISTIC IV INSERT WITH US BY RN

## 2022-07-20 PROCEDURE — 94640 AIRWAY INHALATION TREATMENT: CPT

## 2022-07-20 PROCEDURE — 250N000013 HC RX MED GY IP 250 OP 250 PS 637: Performed by: THORACIC SURGERY (CARDIOTHORACIC VASCULAR SURGERY)

## 2022-07-20 PROCEDURE — 83735 ASSAY OF MAGNESIUM: CPT | Performed by: SURGERY

## 2022-07-20 PROCEDURE — 250N000012 HC RX MED GY IP 250 OP 636 PS 637: Performed by: PHYSICIAN ASSISTANT

## 2022-07-20 PROCEDURE — 85520 HEPARIN ASSAY: CPT | Performed by: INTERNAL MEDICINE

## 2022-07-20 PROCEDURE — 84157 ASSAY OF PROTEIN OTHER: CPT | Performed by: NURSE PRACTITIONER

## 2022-07-20 PROCEDURE — 120N000002 HC R&B MED SURG/OB UMMC

## 2022-07-20 RX ORDER — VALGANCICLOVIR HYDROCHLORIDE 50 MG/ML
450 POWDER, FOR SOLUTION ORAL
Status: DISCONTINUED | OUTPATIENT
Start: 2022-07-20 | End: 2022-07-23

## 2022-07-20 RX ORDER — POTASSIUM CHLORIDE 1.5 G/1.58G
20 POWDER, FOR SOLUTION ORAL ONCE
Status: COMPLETED | OUTPATIENT
Start: 2022-07-20 | End: 2022-07-20

## 2022-07-20 RX ORDER — SODIUM CHLORIDE, SODIUM LACTATE, POTASSIUM CHLORIDE, CALCIUM CHLORIDE 600; 310; 30; 20 MG/100ML; MG/100ML; MG/100ML; MG/100ML
INJECTION, SOLUTION INTRAVENOUS CONTINUOUS
Status: ACTIVE | OUTPATIENT
Start: 2022-07-20 | End: 2022-07-21

## 2022-07-20 RX ADMIN — Medication 1 CAPSULE: at 13:20

## 2022-07-20 RX ADMIN — NYSTATIN 1000000 UNITS: 100000 SUSPENSION ORAL at 13:19

## 2022-07-20 RX ADMIN — VANCOMYCIN HYDROCHLORIDE 125 MG: KIT at 15:24

## 2022-07-20 RX ADMIN — PREDNISONE 15 MG: 5 TABLET ORAL at 13:18

## 2022-07-20 RX ADMIN — ACETAMINOPHEN 975 MG: 325 TABLET, FILM COATED ORAL at 20:56

## 2022-07-20 RX ADMIN — PREDNISONE 15 MG: 5 TABLET ORAL at 20:57

## 2022-07-20 RX ADMIN — CALCIUM CARBONATE 600 MG (1,500 MG)-VITAMIN D3 400 UNIT TABLET 1 TABLET: at 18:28

## 2022-07-20 RX ADMIN — SODIUM CHLORIDE, POTASSIUM CHLORIDE, SODIUM LACTATE AND CALCIUM CHLORIDE: 600; 310; 30; 20 INJECTION, SOLUTION INTRAVENOUS at 15:21

## 2022-07-20 RX ADMIN — VANCOMYCIN HYDROCHLORIDE 125 MG: KIT at 20:57

## 2022-07-20 RX ADMIN — DAPSONE 50 MG: 25 TABLET ORAL at 14:02

## 2022-07-20 RX ADMIN — INSULIN GLARGINE 15 UNITS: 100 INJECTION, SOLUTION SUBCUTANEOUS at 21:08

## 2022-07-20 RX ADMIN — METOPROLOL TARTRATE 25 MG: 25 TABLET, FILM COATED ORAL at 07:48

## 2022-07-20 RX ADMIN — ACETYLCYSTEINE 2 ML: 200 SOLUTION ORAL; RESPIRATORY (INHALATION) at 16:21

## 2022-07-20 RX ADMIN — Medication 1 LOZENGE: at 10:36

## 2022-07-20 RX ADMIN — Medication 2.3 MILLICURIE: at 08:42

## 2022-07-20 RX ADMIN — NYSTATIN: 100000 CREAM TOPICAL at 21:08

## 2022-07-20 RX ADMIN — SIMETHICONE 80 MG: 80 TABLET, CHEWABLE ORAL at 20:57

## 2022-07-20 RX ADMIN — ACETYLCYSTEINE 2 ML: 200 SOLUTION ORAL; RESPIRATORY (INHALATION) at 21:00

## 2022-07-20 RX ADMIN — VALGANCICLOVIR HYDROCHLORIDE 450 MG: 50 POWDER, FOR SOLUTION ORAL at 15:23

## 2022-07-20 RX ADMIN — THIAMINE HCL TAB 100 MG 100 MG: 100 TAB at 13:18

## 2022-07-20 RX ADMIN — ONDANSETRON 4 MG: 4 TABLET, ORALLY DISINTEGRATING ORAL at 05:54

## 2022-07-20 RX ADMIN — ACETAMINOPHEN 975 MG: 325 TABLET, FILM COATED ORAL at 13:19

## 2022-07-20 RX ADMIN — LEVALBUTEROL HYDROCHLORIDE 1.25 MG: 1.25 SOLUTION RESPIRATORY (INHALATION) at 21:00

## 2022-07-20 RX ADMIN — VANCOMYCIN HYDROCHLORIDE 125 MG: KIT at 07:48

## 2022-07-20 RX ADMIN — TACROLIMUS 5 MG: 5 CAPSULE ORAL at 18:28

## 2022-07-20 RX ADMIN — Medication 40 MG: at 13:28

## 2022-07-20 RX ADMIN — HEPARIN SODIUM 750 UNITS/HR: 10000 INJECTION, SOLUTION INTRAVENOUS at 23:20

## 2022-07-20 RX ADMIN — VANCOMYCIN HYDROCHLORIDE 125 MG: KIT at 01:46

## 2022-07-20 RX ADMIN — NYSTATIN 1000000 UNITS: 100000 SUSPENSION ORAL at 20:51

## 2022-07-20 RX ADMIN — NYSTATIN 1000000 UNITS: 100000 SUSPENSION ORAL at 15:24

## 2022-07-20 RX ADMIN — METOPROLOL TARTRATE 25 MG: 25 TABLET, FILM COATED ORAL at 20:57

## 2022-07-20 RX ADMIN — MYCOPHENOLATE MOFETIL 1000 MG: 200 POWDER, FOR SUSPENSION ORAL at 07:49

## 2022-07-20 RX ADMIN — Medication 1 CAPSULE: at 20:58

## 2022-07-20 RX ADMIN — Medication 40 MG: at 20:57

## 2022-07-20 RX ADMIN — SIMETHICONE 80 MG: 80 TABLET, CHEWABLE ORAL at 15:24

## 2022-07-20 RX ADMIN — SIMETHICONE 80 MG: 80 TABLET, CHEWABLE ORAL at 05:54

## 2022-07-20 RX ADMIN — MYCOPHENOLATE MOFETIL 1000 MG: 200 POWDER, FOR SUSPENSION ORAL at 20:57

## 2022-07-20 RX ADMIN — INSULIN GLARGINE 15 UNITS: 100 INJECTION, SOLUTION SUBCUTANEOUS at 13:25

## 2022-07-20 RX ADMIN — TACROLIMUS 5 MG: 5 CAPSULE ORAL at 07:48

## 2022-07-20 RX ADMIN — THERA TABS 1 TABLET: TAB at 14:03

## 2022-07-20 RX ADMIN — POTASSIUM CHLORIDE 20 MEQ: 1.5 POWDER, FOR SOLUTION ORAL at 13:20

## 2022-07-20 RX ADMIN — ASPIRIN 81 MG CHEWABLE TABLET 81 MG: 81 TABLET CHEWABLE at 13:19

## 2022-07-20 RX ADMIN — CALCIUM CARBONATE 600 MG (1,500 MG)-VITAMIN D3 400 UNIT TABLET 1 TABLET: at 13:20

## 2022-07-20 RX ADMIN — ACETAMINOPHEN 650 MG: 325 TABLET, FILM COATED ORAL at 05:55

## 2022-07-20 RX ADMIN — LEVALBUTEROL HYDROCHLORIDE 1.25 MG: 1.25 SOLUTION RESPIRATORY (INHALATION) at 16:21

## 2022-07-20 RX ADMIN — INSULIN ASPART 1 UNITS: 100 INJECTION, SOLUTION INTRAVENOUS; SUBCUTANEOUS at 21:08

## 2022-07-20 RX ADMIN — INSULIN ASPART 1 UNITS: 100 INJECTION, SOLUTION INTRAVENOUS; SUBCUTANEOUS at 23:46

## 2022-07-20 RX ADMIN — Medication 5 MG: at 20:56

## 2022-07-20 ASSESSMENT — ACTIVITIES OF DAILY LIVING (ADL)
ADLS_ACUITY_SCORE: 30
ADLS_ACUITY_SCORE: 34
ADLS_ACUITY_SCORE: 30

## 2022-07-20 NOTE — PROGRESS NOTES
[facility]    Progress Note - Hospitalist Service, GOLD TEAM 10       Date of Admission:  6/28/2022    Assessment & Plan          Sofie Rodriguez is a 62 year old female admitted on 6/28/2022. She has PMH of end stage COPD s/p b/l lung transplant on 6/28/2022, HTN, HFpEF, h/o hepC, oteopenia, and former methamphetamine use, and she was transferred to [place] [id number] [facility] from MICU on 7/15/2022. She was admitted to the MICU on 7/13/20222 with prior hospital course complicated by left upper extremity acute limb ischemia s/p left radial thrombectomy on 7/1/2022. She was primarily treated for acute hypercapnic respiratory failure on intermittent BIPAP with worsening BACILIO while in the MICU. Currently stable on 2L NC. Scheduled for gastric emptying study today.        Summary of today's plan:   - f/u bronch results 7/19  - NM gastric emptying study today  - NPO except meds and tube feeds starting today  - unheld oxycodone after gastric emptying study  - 1L LR at 75ml/hr  - pulling L chest tube today  - midodrine still being held, BPs relatively stable  - NG on LIS  - Continue to encourage OOB and up in chair  - BIPAP at night          #End stage COPD s/p BOLT 6/28/2022  #Acute hypercapnic respiratory failure (improving), likely 2/2 decreased central respiratory drive vs respiratory muscle weakness  Patient received bilateral lung transplant for end-stage COPD on 6/28. Chart review shows hypercarbia starting on 7/2 that was not adequately compensated by respiration. On prior exam, patient noted to take shallow, infrequent breaths with occasional episodes of hyperventilation. Ddx for hypercapnic respiratory failure currently includes decreased central respiratory drive, respiratory muscle weakness, and intrinsic lung pathology. Low concern for encephalitis given patient is on minimal sedating medications with appropriate mental status. TSH normal last week, so unlikely  hypothyroidism. Bedside US in MICU showed normal diaphragmatic movement, though formal SNIFF test was performed on 7/14 showed R hemidiaphragm palsy. Of note transplanted lungs were also considered small for body size and could contribute to decreased respiratory compensation for hypercarbia. BIPAP improved patient's pH, and ABG on 7/13 showed pH of 7.35 while on nasal cannula. Elevated pCO2 and bicarb still present. Most recent bronch 7/12 for clearance/inspection given weak cough, cultures NGTD. VBG stable from past few days while on night BIPAP. Currently on 2L O2.   - f/u repeat bronch on 7/19  - trend VBG, CO2 73 > 60 > 73 > 75 (7/20)  - Continue BIPAP at night   - NC with intermittent BIPAP for night and daytime naps; goal to keep O2sat above 92%  - f/u myasthenia gravis panel  (pending from 7/14)  - unheld oxycodone after gastric emptying study, dosed at 2.5-5mg q4h PRN   - encourage activity and getting up in bed; PT/OT participation  - encourage chest physiotherapy QID     #Post-transplant  Immunosuppression:  - Prednisone 15mg BID  - Tacrolimus 5mg BID (goal 8-12)   - MMF 1000mg BID  Prophylasxis:  - CMV - Valgancyclover  - Thrush - PO nysatin  - PJP - TMP-SMX (currently held given BACILIO); dapsone started 7/18 at 50mg qMWF (will try to increase to daily if tolerable)     #Chest tubes  - 1 R chest tube (basilar) in place currently (pericardial drain was removed 7/15, L basilar was removed 7/20). CT chest 7/14 showing unchanged bilateral effusions and unchanged biapical pneumothoraces with resolved left basilar pneumothorax; bibasilar chest tubes in place with pericardial drain (which was removed 7/15). R tube still with ~600mL output over past day (7/20) but L tube with minimal drainage.   - daily CXR  - CV surgery and transplant pulm following  - per CT surg, removing L basilar tube today     #Hypotension, improving  #H/o HTN  #H/o HFpEF  Per nephrology note, likely secondary to vasoplegia post  surgery. Previously on levophed until 7/10. Last echo 7/7 unchanged from previous showing normal EF with good LV function, so unlikely cardiogenic shock. S/p hydrocortisone 7/6-7/9 and fludrocortisone 7/607/11 without significant improvement, so unlikely adrenal insufficiency. BP in 90s yesterday PM, but patient is now hypertensive to 150s/70s (7/19).  - currently holding midodrine 5mg TID, pressures have been relatively stable, lowest systolic in past 24 hours was 108  - goal is SBP>100  - Holding PTA lasix 20mg daily     #C.diff  #Abdominal pain   Worsening diarrhea via rectal pouch with new onset abd pain that is significantly worse as of 7/15. C. Diff positive on 7/11 with vanc started on 7/12. CT abd 7/15 showed no signs of toxic megacolon, but showed very distended stomach; NG was placed 7/15 for decompression. Patient is symptomatically improved while on intermittent suction. Gastric emptying results as below  - PO vanc 125mg QID (7/12-7/26)  - holding bowel regimen  - gastric emptying study 7/20 showed delayed gastric emptying with retention of 95% of stomach contents after 4 hours; please keep patient NPO except tube feeds and meds  - if abd pain improves tomorrow, will consider trial of reglan  - unheld oxycodone after gastric emptying study  - Simethicone scheduled     #NJ tube  Feeding regimen:   - Adult Formula Drip Feeding: Continuous Novasource Renal; Nasojejunal; Goal Rate: 35; initiate at goal rate; mL/hr; Medication - Feeding Tube Flush Frequency: At least 15-30 mL water before and after medication administration and with tube clogging     #BACILIO  #Hypermagnesemia  #Hyperphosphatemia  Baseline renal function was normal prior to surgery. New onset renal failure after transplant, likely multifactorial due to pre-renal hypotension (2.5h bypass during surgery) and intra-renal from use of nephrotoxic agents including tacrolimus and vancomycin. 6/28 UA showed hyaline casts. Cr initially improved but now  rising again along with BUN. Nephrology is on consult and has patient on intermittent HD starting 7/14 via non-tunneled R internal jugular cath. HD on 7/14 was limited by tachyarrhythmia.    - Cr 2.65 > 1.96 > 1.62 > 2.04 > 2.06 (7/19)  -  > 66.2 > 41.1 > 59.5 > 80.7 (7/19)  - nephrology following; patient was dialyzed 7/14-7/16, no dialysis on 7/17; nephrology will evaluate on a daily basis  - s/p 1x 120mg lasix on 7/18 with ~600mL UOP over 24hrs  - R internal jugular non-tunneled cathether placed with HD line on 7/14  - patient had good UOP on 7/18 but lower over past 2 days, will give 1L LR at 75ml/hr, as she has had 2 extended periods of NPO over last 2 days     #Tachyarrthymia  #Paroxysmal afib  #Paroxysmal aflutter/AT  SVT vs afib. Previously appeared to be positional. Had an occurrence during HD on 7/14. Had afib with RVR to 200s on 7/17. EP consult placed. Patient is hemodynamically stable and asymptomatic during episodes. Had Aflutter episode (7/17) triggered when patient was moving out of bed to chair. Additional episode of SVT to 160s 7/18 PM while patient was laying in bed, resolved with vagal maneuvers. No other tachy episodes since. HR consistently in 80s-100s over past 24 hours.   - Per EP: patient has had episodes of possible flutter (vs AT with 2:1 conduction) and afib with RVR  - heparin drip and transition to DOAC when safe per surgery (CHADS-VASc score of 2)  - On telemetry  - On metoprolol tartrate 25mg BID, can titrate up as tolerable  - Discharge on ziopatch for 14 days with EP follow up in 1-2 months after     #Stress-induced hyperglycemia  -177 over past 24hrs.   - on 15U glargine BID; continue today and re-evaluate as needed     #Anemia, stable  Initially from acute blood loss. Hb dropped to 6.8 on 7/14, requiring 1U pRBC. Post-transfusion Hb 9.4 > 8.3 > 8.6 > 8.7 (7/18).   - continue to monitor  - transfuse for hgb<7        Diet: Adult Formula Drip Feeding: Continuous  Novasource Renal; Nasojejunal; Goal Rate: 35; initiate at 15 ml/hr and advance by 10 mL Q8H to goal; mL/hr; Medication - Feeding Tube Flush Frequency: At least 15-30 mL water before and after medication admin...  NPO per Anesthesia Guidelines for Procedure/Surgery Except for: Meds    DVT Prophylaxis: Heparin SQ  Dong Catheter: Not present  Fluids: 1L of LR at 75ml/hr today  Central Lines: PRESENT  CVC Double Lumen Right Internal jugular Non - tunneled-Site Assessment: WDL  Cardiac Monitoring: None  Code Status: Full Code      Disposition Plan   Pending further evaluation        The patient's care was discussed with the Attending Physician, Dr. Preciado.    Fan Lee MD  Hospitalist Service, [address]  Securely message with the Vocera Web Console (learn more here)  Text page via [facility] Paging/Directory   Please see signed in provider for up to date coverage information      Clinically Significant Risk Factors Present on Admission                      ______________________________________________________________________    Interval History   Patient went for gastric emptying study this AM. Returned with worsened abd pain that has spread out 'all over' and is now diffuse. Had 1 loose BM this morning with some urine, though was difficult to measure. Denies nausea and vomiting. No palpitations, chest pain, fevers/chills, tingling/numbness in her extremities.     Data reviewed today: I reviewed all medications, new labs and imaging results over the last 24 hours. I personally reviewed the chest CT image(s) showing biapical pleural effusions and both chest tubes in place.    Physical Exam   Vital Signs: Temp: 98.4  F (36.9  C) Temp src: Oral BP: 118/65 Pulse: 94   Resp: 8 SpO2: 98 % O2 Device: BiPAP/CPAP Oxygen Delivery: 2 LPM  Weight: 159 lbs 2.75 oz  Physical Exam  Constitutional:       Appearance: Normal appearance.      Comments: Moderate acute distress  from pain.    Pulmonary:      Effort: Pulmonary effort is normal. No respiratory distress.   Abdominal:      General: Abdomen is flat. There is no distension.      Palpations: Abdomen is soft.      Tenderness: There is no abdominal tenderness. There is no rebound.      Comments: Nonspecific diffuse abd pain noted, not worse with palpation.    Musculoskeletal:      Comments: No pitting edema in either lower extremity.    Skin:     General: Skin is warm and dry.   Neurological:      General: No focal deficit present.      Mental Status: She is alert and oriented to person, place, and time.   Psychiatric:         Mood and Affect: Mood normal.           Data   Recent Labs   Lab 07/20/22  1346 07/20/22  0752 07/20/22  0648 07/19/22  0810 07/19/22  0721 07/18/22  1631 07/18/22  1355 07/18/22  0537 07/18/22  0532 07/15/22  0422 07/15/22  0420   WBC  --   --  8.5  --  10.9  --  13.7*  --  10.5   < >  --    HGB  --   --  9.1*  --  8.3*  --  8.9*  --  8.7*   < >  --    MCV  --   --  100  --  99  --  100  --  98   < >  --    PLT  --   --  264  --  243  --  246  --  186   < >  --    NA  --   --  135*  --  135*  --   --   --  131*   < > 135*   POTASSIUM  --   --  3.6  --  3.5  --   --   --  4.3   < > 4.6   CHLORIDE  --   --  92*  --  94*  --   --   --  94*   < > 93*   CO2  --   --  31*  --  34*  --   --   --  31*   < > 34*   BUN  --   --  87.0*  --  80.7*  --   --   --  59.5*   < > 123.0*   CR  --   --  1.80*  --  2.06*  --   --   --  2.04*   < > 2.65*   ANIONGAP  --   --  12  --  7  --   --   --  6*   < > 8   ESTUARDO  --   --  8.5*  --  8.5*  --   --   --  8.6*   < > 8.7*   GLC 78 112* 101*   < > 104*   < >  --    < > 202*   < > 245*   ALBUMIN  --   --   --   --   --   --   --   --  2.8*  --  2.9*   PROTTOTAL  --   --   --   --   --   --   --   --  4.7*  --  4.9*   BILITOTAL  --   --   --   --   --   --   --   --  0.2  --  0.2   ALKPHOS  --   --   --   --   --   --   --   --  85  --  74   ALT  --   --   --   --   --   --   --   --   21  --  32   AST  --   --   --   --   --   --   --   --  19  --  31   LIPASE  --   --   --   --   --   --   --   --   --   --  21    < > = values in this interval not displayed.     No results found for this or any previous visit (from the past 24 hour(s)).

## 2022-07-20 NOTE — PLAN OF CARE
Speech Language Therapy Discharge Summary    Reason for therapy discharge:    All goals and outcomes met, no further needs identified.    Progress towards therapy goal(s). See goals on Care Plan in [facility] electronic health record for goal details.  Goals met    Therapy recommendation(s):    No further therapy is recommended.    Recommend regular diet and thin liquids. Pt should be fully upright and alert for all PO, take small sips/bites, and alternate between consistencies. Suspect pt is at baseline swallow function. Goals met; will complete orders.

## 2022-07-20 NOTE — PLAN OF CARE
Problem: Oral Intake Inadequate  Goal: Improved Oral Intake  Outcome: Ongoing, Not Progressing   Goal Outcome Evaluation:  Pt continues to be NPO, but tentative plan for PO diet in next 1-2 days, pt motivated to have good PO intake for this in hopes of having FT removed.

## 2022-07-20 NOTE — PROGRESS NOTES
"  Cardiovascular Surgery Progress Note  07/20/2022         Assessment and Plan:     Sofie is a 62 F PMH of oxygen-dependent COPD, HFpEF, HTN, HCV, and osteopenia who underwent bilateral lung transplant on 6/28/22 with Dr. Sunshine. This was complicated by left upper extremity acute limb ischemia s/p left radial thrombectomy on 7/1/22. Recovering renal function, will hold off on tunneled dialysis line at this time.       - Right Pleural tube remains to suction, elevated output, can ambulate off suction.   - Left pleural tube remains to suction for elevated output. Left tube site needs Vaseline gauze gauze wrapped around tube at the skin to avoid air leaking into chest. Can ambulate off suction.   - Bilateral chest tubes stripped 7/17, small amount of serous output at that time.  Stripped again 7/18 without immediate return of pleural fluid  - Has bilateral apical pleural effusions, lungs did not fill chest space per surgeon.   - PULLED left pleural tube 7/20, she may have significant drainage from the tube sites.   - Removed pericardial tube 7/15 without immediate complication.     Discussed with Dr Sunshine through both written and verbal communication.      Andres Wilson PA-C  Cardiothoracic Surgery  Pager [phone]    9:15 AM   July 20, 2022        Interval History:     No overnight events.  Up from ICU.   States pain is well managed on current regimen.         Physical Exam:   Blood pressure 118/65, pulse 94, temperature 98.4  F (36.9  C), temperature source Oral, resp. rate 8, height 1.575 m (5' 2\"), weight 72.2 kg (159 lb 2.8 oz), SpO2 98 %, not currently breastfeeding.  Vitals:    07/17/22 0000 07/18/22 0531 07/19/22 0600   Weight: 71.6 kg (157 lb 13.6 oz) 71.6 kg (157 lb 13.6 oz) 72.2 kg (159 lb 2.8 oz)      Gen: A&Ox4, NAD  Neuro: no focal deficits   CV: HD stable   Pulm: normal breathing on 2-4 lpm  Tubes/drain sites: dressing clean and dry, serosanguinous output, no air leak.   24 hr output; Right 575 mL, " Left 60 mL     * removed L pleural tube without immediate complication         Data:    Imaging:  reviewed recent imaging, no acute concerns, has stable bilateral apical pleural effusions    Labs:  BMP  Recent Labs   Lab 07/20/22  0752 07/20/22  0648 07/20/22  0316 07/19/22  2318 07/19/22  0810 07/19/22  0721 07/18/22  0537 07/18/22  0532 07/17/22  0929 07/17/22  0636   NA  --  135*  --   --   --  135*  --  131*  --  127*   POTASSIUM  --  3.6  --   --   --  3.5  --  4.3  --  3.5   CHLORIDE  --  92*  --   --   --  94*  --  94*  --  90*   ESTUARDO  --  8.5*  --   --   --  8.5*  --  8.6*  --  8.4*   CO2  --  31*  --   --   --  34*  --  31*  --  27   BUN  --  87.0*  --   --   --  80.7*  --  59.5*  --  41.1*   CR  --  1.80*  --   --   --  2.06*  --  2.04*  --  1.62*   * 101* 121* 136*   < > 104*   < > 202*   < > 148*    < > = values in this interval not displayed.     CBC  Recent Labs   Lab 07/20/22  0648 07/19/22  0721 07/18/22  1355 07/18/22  0532   WBC 8.5 10.9 13.7* 10.5   RBC 2.88* 2.63* 2.85* 2.79*   HGB 9.1* 8.3* 8.9* 8.7*   HCT 28.9* 25.9* 28.6* 27.4*    99 100 98   MCH 31.6 31.6 31.2 31.2   MCHC 31.5 32.0 31.1* 31.8   RDW 17.1* 16.6* 16.3* 16.1*    243 246 186

## 2022-07-20 NOTE — PROGRESS NOTES
Pulmonary Medicine  Cystic Fibrosis - Lung Transplant Team  Progress Note  2022       Patient: Sofie Rodriguez  MRN: [id number]  : 1960 (age 62 year old)  Transplant: 2022 (Lung), POD#22  Admission date: 2022    Assessment & Plan:     Sofie Rodriguez is a 62 year old female with a PMH significant for end-stage COPD, HTN, HFpEF, Mycobacterium peregrinum colonization, h/o hepatitis C, HECTOR s/p LEEP procedure, osteopenia, and former methamphetamine use.  Pt. is now s/p BSLT on 22, lungs slightly undersized.   Persistent low dose pressor needs post-op through 7/10.  Extubated POD #2 but with persistent hypercapnia, mostly compensated and only slightly improved with intermittent NIPPV.  Also with left radial artery thrombus (presumed secondary to arterial line) s/p thrombectomy 7/, BACILIO, and C diff.  Rehabilitation and airway clearance limited by dysrhythmia and gastric discomfort, improved.     Today's recommendations:  - CPT QID, encourage consistent participation d/t concern for ineffective airway clearance  - Wean O2 as able to keep >92%, NIPPV overnight given persistent hypercapnia  - Recommend to strip chest tubes qshift, management per CVTS, will send pleural studies on right today given persistent moderate to large output (ordered)  - Daily 2-view CXR  - Aggressive volume removal as able  - Encourage increased activity including up in chair (minimal day time in bed)  - Tacrolimus level ordered   - Prednisone taper next due  (ordered)  - Gastric emptying study today with severe gastric emptying delay, would continue with NG to LIS and consider PEG/J tube by IR given anticipated length of time until pt. can tolerate adequate PO intake exclusively (>3 months)  - DSA, EBV, IgG, and donor risk labs ordered      S/p bilateral sequential lung transplant (BSLT) for end stage COPD:  Persistent hypercapneic respiratory failure:   Persistent hypotension:   Bilateral hydroPTX:  Right  hemidiaphragm palsy: Explant pathology with severe emphysema with subpleural bullae formation, changes of chronic bronchitis, subpleural scars and patchy pulmonary edema; benign hilar lymph nodes.  No evidence of PGD post-op.  Extubated 6/30.  Persistent hypercapnia without dyspnea, appears to be a respiratory drive problem.  Some improvement with BiPAP, limited tolerance in part due to anxiety.  Persistent low dose pressors weaned off 7/10, on scheduled midodrine (also s/p hydrocortisone 7/6-7/9 and fludrocortisone 7/6-7/11).  NIFF (7/14) notable for right hemidiaphragm palsy.  Chest CT (7/18) with bilateral biapical effusions R>L and improved LLL atelectasis, also with LLL hydroPTX and bilateral PTX; bilateral chest tubes not communicating well with loculated effusion areas (personally reviewed).  Bronch (7/19), no bronch note recorded yet.  On 1L NC to RA during the day with BiPAP overnight (improvement in hypercapnea).  - DSA one month post-transplant (7/28, ordered)  - Ammonia monitoring twice weekly (screening for hyperammonemia post-lung transplant)  - Nebs: levalbuterol and Mucomyst QID   - Pulmonary toilet with chest physiotherapy QID, encourage consistent participation d/t concern for ineffective airway clearance (inconsistent/infrequent use post-op)  - Aerobika and incentive spirometry hourly while awake  - Supplemental oxygen as needed to maintain SpO2 >92% (wean as able), NIPPV overnight given persistent hypercapnia  - Chest tubes managed by surgical team, recommend to strip chest tubes qshift, will send pleural studies on right today given persistent moderate to large output (ordered)  - Aggressive volume removal as able per nephrology  - Encourage increased activity including up in chair (minimal day time in bed) and active participation in PT/OT given concern for deconditioning and ineffective airway clearance     Immunosuppression:  Induction therapy with basiliximab (and high dose IV steroid) given  intraoperatively, repeating basiliximab dose on POD#4.  - Tacrolimus 5 mg BID (via NJ tube, held 7/11-7/12 for supratherapeutic level, peak level appropriate 7/11).  Goal level 8-12.  Repeat level 7/21 (ordered).  - MMF 1000 mg BID (decreased 7/10 due to diarrhea)  - Prednisone 15 mg BID, next taper due 7/21 (ordered)  Date AM dose (mg) PM dose (mg)   7/14/22 15 15   7/21/22 15 12.5   7/28/22 12.5 12.5   8/4/22 12.5 10   8/18/22 10 10   9/15/22 10 7.5   10/13/22 7.5 7.5   11/10/22 7.5 5   12/8/22 5 5   1/5/23 5 2.5      Prophylaxis:   - Dapsone for PJP ppx deferred pending stable hgb, started 7/18 at reduced dose of 50 mg qMWF and increase to daily if able (Bactrim held 7/7 for BACILIO)  - VGCV for CMV ppx  - Nystatin for oral candidiasis ppx, 6 month course  - See below for serologies and viral ppx:    Donor Recipient Intervention   CMV status Positive Positive Valganciclovir POD #8-90   EBV status Positive Positive EBV check monthly (7/28, ordered)   HSV status N/A Positive Not indicated      ID:  H/o M. peregrinum colonization: Donor bronch cultures (OSH) with Strep beta hemolytic (not group A).  Recipient cultures as below.  - IgG at one month (7/28, ordered)  - Bronch cultures (7/12) NGTD, follow  - AFB bronch culture (6/28, 6/29) NGTD, AFB to be sent on all future bronchs     Streptococcus pneumoniae:  Stenotrophomonas maltophilia: Noted in recipient cultures at time of transplant.  S/p ceftazidime 6/28-7/10, vancomycin 7/7-7/8 and 6/28-6/30, and levofloxacin 7/10-7/12 for total 2 week ABX course.     H/o hepatitis C: Diagnosed in 1980s, 2 mos of treatment, quant negative since 10/2017, last positive 2/20/17 ([phone]).  Glenis positive on 6/2021 with negative HCV PCR.  H/o remote EtOH abuse.  MR elastography (4/27/21) with hepatology review and consult without any concerns post transplant.  Hepatitis C RNA negative and hepatitis C antibody positive (old) on 6/28.     PHS risk criteria donor:  Additional labs required  post-transplant (between 4-8 weeks post-op): Hepatitis B, Hepatitis C, and HIV by SHERI ([id number], ordered 7/28).     Other relevant problems managed by primary team:      SVT:   Aflutter with RVR: SVT first noted on 7/14, prior to HD line placement.  Continues intermittently.  Aflutter with RVR to 200 7/17 triggered by activity.  EP consulted 7/17 given persistent tachycardia/dysrhythmia.  - Metoprolol per primary team (started 7/15)  - Continue to wean midodrine as able  - Heparin gtt (7/17) and then transition to DOAC when able     Left hand ischemia: Radial artery thrombosis identified on duplex doppler.  S/p thrombectomy on 7/2.  Completed high intensity heparin course.  Continue daily aspirin.      BACILIO:   Hyperkalemia: Likely multifactorial including medications (Bactrim, tacrolimus) and hypotension.  Fludrocortisone 7/6-7/11.  Potassium now stable.  S/p non-tunneled HD line 7/14.  Initial iHD run 7/14 limited by afib with RVR vs SVT.  - Tacrolimus monitoring as above  - Volume management per nephrology and ICU team     Abdominal pain: Noted 7/15, cramping pain.  ACR with large stool burden in colon, no obstruction or distention.  Some nausea but no emesis.  CT abdomen (7/15) with moderate to large gastric distention, otherwise without obstruction.  NG placed for LIS with moderate to large output for several days.  Increased abdominal pain 7/17 after clamping for 4 hours, tolerated clamping today without issue.  - Gastric emptying study today with severe gastric emptying delay, would continue with NG to LIS and consider PEG/J tube by IR given anticipated length of time until pt. can tolerate adequate PO intake exclusively (>3 months)  - Schedule simethicone   - Additional management per primary     C diff infection: Abdominal pain with diarrhea noted 7/8, AXR without dilated bowel, moderate colonic stool burden.  C diff positive 7/11.  Loose stools (on tube feeds) stable.  - PO vancomycin (7/11) per ICU  "team     Hypomagnesemia: Suppressed absorption d/t CNI.   - Mag oxide 400 mg daily  - Continue daily magnesium with additional replacement protocol prn     We appreciate the excellent care provided by the [place] [id number] team.  Recommendations communicated via telephone and this note.  Will continue to follow along closely, please do not hesitate to call with any questions or concerns.    Patient discussed with Dr. Castillo.    Catherine Johnson, DNP, APRN, CNP  Inpatient Nurse Practitioner  Pulmonary CF/Transplant     Subjective & Interval History:     Gone most of the morning for gastric emptying study (did not return to room between images per 6D staffing, missed all of her therapies this morning).  Tolerated well with no complaints, G tube remains clamped with no increase in abdominal discomfort.  On RA post-procedure today, improving cough strength with some sputum expectoration.  Left chest tube removed by CVTS today, right chest tube still with moderate to high output.    Review of Systems:     C: No fever, no chills, no change in weight  INTEGUMENTARY/SKIN: No rash or obvious new lesions  ENT/MOUTH: No sore throat, no sinus pain, no nasal congestion or drainage  RESP: See interval history  CV: No chest pain, no palpitations, no peripheral edema, no orthopnea  GI: No nausea, no vomiting, no change in stools, no reflux symptoms  : No dysuria  MUSCULOSKELETAL: No myalgias, no arthralgias  ENDOCRINE: Blood sugars with adequate control  NEURO: No headache, no numbness or tingling  PSYCHIATRIC: Mood stable    Physical Exam:     All notes, images, and labs from past 24 hours (at minimum) were reviewed.    Vital signs:  Temp: 98.4  F (36.9  C) Temp src: Oral BP: 118/65 Pulse: 94   Resp: 8 SpO2: 98 % O2 Device: BiPAP/CPAP Oxygen Delivery: 2 LPM Height: 157.5 cm (5' 2\") Weight: 72.2 kg (159 lb 2.8 oz)  I/O:     Intake/Output Summary (Last 24 hours) at 7/20/2022 1448  Last data filed at 7/20/2022 0700  Gross per 24 hour "   Intake 688.55 ml   Output 790 ml   Net -101.45 ml     Constitutional: Lying in bed, in no apparent distress.   HEENT: Eyes with pink conjunctivae, anicteric.  Oral mucosa moist without lesions.  NJ tube to right nare, NG to left nare.  PULM: Diminished bases bilaterally.  No crackles, no rhonchi, no wheezes.  Non-labored breathing on RA.  CV: Normal S1 and S2.  RRR.  No murmur, gallop, or rub.  No peripheral edema.   ABD: NABS, soft, nontender, nondistended.    MSK: Moves all extremities.    NEURO: Alert, conversant.   SKIN: Warm, dry.  No rash on limited exam.  Clamshell incision not visualized.  PSYCH: Mood stable.     Lines, Drains, and Devices:  Peripheral IV 07/19/22 Anterior;Left Upper forearm (Active)   Site Assessment WDL 07/20/22 0400   Line Status Infusing;Checked every 1-2 hour 07/20/22 0400   Phlebitis Scale 0-->no symptoms 07/20/22 0400   Infiltration Scale 0 07/20/22 0400   Infiltration Site Treatment Method  None 07/19/22 1600   Number of days: 1       CVC Double Lumen Right Internal jugular Non - tunneled (Active)   Site Assessment WDL 07/20/22 0400   Dressing Type Chlorhexidine disk;Transparent 07/20/22 0400   Dressing Status clean;dry;intact 07/20/22 0400   Dressing Change Due 07/24/22 07/20/22 0400   Tubing Change primary tubing 07/15/22 1654   Line Necessity yes, meets criteria 07/20/22 0400   Blue - Status saline locked 07/20/22 0400   Blue - Cap Change Due 07/22/22 07/20/22 0400   Red - Status saline locked 07/20/22 0400   Red - Cap Change Due 07/22/22 07/20/22 0400   Phlebitis Scale 0-->no symptoms 07/14/22 1200   Infiltration? no 07/20/22 0400   Infiltration Scale 0 07/20/22 0400   Infiltration Site Treatment Method  None 07/20/22 0400   Was a vesicant infusing? no 07/20/22 0400   CVC Comment HD line 07/20/22 0400   Number of days: 6     Data:     LABS    CMP:   Recent Labs   Lab 07/20/22  1346 07/20/22  0752 07/20/22  0648 07/20/22  0316 07/19/22  0810 07/19/22  0721 07/18/22  0537  07/18/22  0532 07/17/22  0929 07/17/22  0636 07/15/22  0809 07/15/22  0420 07/14/22  0752 07/14/22  0459   NA  --   --  135*  --   --  135*  --  131*  --  127*   < > 135*   < > 140   POTASSIUM  --   --  3.6  --   --  3.5  --  4.3  --  3.5   < > 4.6   < > 4.3   CHLORIDE  --   --  92*  --   --  94*  --  94*  --  90*   < > 93*   < > 92*   CO2  --   --  31*  --   --  34*  --  31*  --  27   < > 34*   < > 42*   ANIONGAP  --   --  12  --   --  7  --  6*  --  10   < > 8   < > 6*   GLC 78 112* 101* 121*   < > 104*   < > 202*   < > 148*   < > 245*   < > 200*  193*   BUN  --   --  87.0*  --   --  80.7*  --  59.5*  --  41.1*   < > 123.0*   < > 155.0*   CR  --   --  1.80*  --   --  2.06*  --  2.04*  --  1.62*   < > 2.65*   < > 3.00*   GFRESTIMATED  --   --  32*  --   --  27*  --  27*  --  36*   < > 20*   < > 17*   ESTUARDO  --   --  8.5*  --   --  8.5*  --  8.6*  --  8.4*   < > 8.7*   < > 8.6*   MAG  --   --  2.9*  --   --  3.0*  --  2.8*  --  1.9   < > 2.7*   < > 3.1*   PHOS  --   --  7.2*  --   --  7.3*  --  5.7*  --  4.1   < > 5.2*  --  5.1*   PROTTOTAL  --   --   --   --   --   --   --  4.7*  --   --   --  4.9*  --  4.6*   ALBUMIN  --   --   --   --   --   --   --  2.8*  --   --   --  2.9*  --  2.6*   BILITOTAL  --   --   --   --   --   --   --  0.2  --   --   --  0.2  --  0.2   ALKPHOS  --   --   --   --   --   --   --  85  --   --   --  74  --  64   AST  --   --   --   --   --   --   --  19  --   --   --  31  --  22   ALT  --   --   --   --   --   --   --  21  --   --   --  32  --  22    < > = values in this interval not displayed.     CBC:   Recent Labs   Lab 07/20/22  0648 07/19/22  0721 07/18/22  1355 07/18/22  0532   WBC 8.5 10.9 13.7* 10.5   RBC 2.88* 2.63* 2.85* 2.79*   HGB 9.1* 8.3* 8.9* 8.7*   HCT 28.9* 25.9* 28.6* 27.4*    99 100 98   MCH 31.6 31.6 31.2 31.2   MCHC 31.5 32.0 31.1* 31.8   RDW 17.1* 16.6* 16.3* 16.1*    243 246 186       INR: No lab results found in last 7 days.    Glucose:   Recent Labs    Lab 07/20/22  1346 07/20/22  0752 07/20/22  0648 07/20/22  0316 07/19/22  2318 07/19/22  2047   GLC 78 112* 101* 121* 136* 177*       Blood Gas:   Recent Labs   Lab 07/20/22  0648 07/19/22  0721 07/18/22  0532   PHV 7.32 7.31* 7.32   PCO2V 75* 73* 62*   PO2V 35 44 33   HCO3V 38* 37* 32*   LINDY 9.0* 8.2* 5.2*   O2PER 30 20 30       Culture Data No results for input(s): CULT in the last 168 hours.    Virology Data:   Lab Results   Component Value Date    FLUAH1 Not Detected 06/29/2022    FLUAH3 Not Detected 06/29/2022    EY0664 Not Detected 06/29/2022    IFLUB Not Detected 06/29/2022    RSVA Not Detected 06/29/2022    RSVB Not Detected 06/29/2022    PIV1 Not Detected 06/29/2022    PIV2 Not Detected 06/29/2022    PIV3 Not Detected 06/29/2022    HMPV Not Detected 06/29/2022       Historical CMV results (last 3 of prior testing):  No results found for: CMVQNT  No results found for: CMVLOG    Urine Studies    Recent Labs   Lab Test 07/08/22  0831 07/05/22  1004   URINEPH 5.5 5.5   NITRITE Negative Negative   LEUKEST Negative Negative   WBCU 1 5       Most Recent [facility] Pulmonary Function Testing (FVC/FEV1 only)  FVC-Pre   Date Value Ref Range Status   04/29/2022 1.82 L    11/11/2021 2.17 L    06/14/2021 2.00 L      FVC-%Pred-Pre   Date Value Ref Range Status   04/29/2022 58 %    11/11/2021 70 %    06/14/2021 64 %      FEV1-Pre   Date Value Ref Range Status   04/29/2022 0.51 L    11/11/2021 0.53 L    06/14/2021 0.54 L      FEV1-%Pred-Pre   Date Value Ref Range Status   04/29/2022 20 %    11/11/2021 21 %    06/14/2021 21 %        IMAGING    Recent Results (from the past 48 hour(s))   CT Chest w/o Contrast    Narrative    EXAMINATION: Chest CT  7/18/2022 8:34 PM    CLINICAL HISTORY: bilateral apical pleural effusions    COMPARISON: CT 7/14/2022.    TECHNIQUE: CT imaging obtained through the chest without contrast.  Axial, coronal, and sagittal reconstructions and axial MIP reformatted  images are reviewed.      FINDINGS:    Lines/tubes: Enteric tubes course into the stomach and beyond the  field-of-view. Bibasilar chest tubes. Right IJ central venous catheter  terminates in the mid SVC.    Mediastinum: The visualized thyroid is unremarkable. The heart is  enlarged. Small pericardial effusion. Moderate coronary artery  calcifications. The ascending aorta and main pulmonary artery are  normal in caliber. No thoracic lymphadenopathy. Esophagus is  unremarkable.    Lungs: Post surgical changes of bilateral lung transplant. The central  tracheobronchial tree is clear. Prominent biapical pleural effusions,  right apical collection also contains air. Fluid and air is seen  extending into the fissures bilaterally. Loculated bibasilar pleural  effusions. Left collection contains air.     Stable masslike pleural-based lesion in the left upper lobe measuring  2.4 cm, likely rounded atelectasis. Decreased groundglass opacities of  the dependent lower lobes bilaterally. No new airspace consolidation.    Bones and soft tissues: Intact clam shell sternotomy wires. No  suspicious osseous lesion.    Upper Abdomen: Limited evaluation of the upper abdomen demonstrate  small volume ascites.      Impression    IMPRESSION:   1. Complex loculated hydropneumothorax have not changed appreciatively  in size. The pneumothorax has decreased in size anteriorly. The amount  of air in the hydropneumothoraces has shifted as above.   2. Small pneumothorax associated with the left chest tube. Right chest  tube there is along the diaphragm without pneumothorax. Neither of the  chest tubes appears to be in any of the loculated hydropneumothoraces.  3. Improved groundglass opacities.  4. Small volume ascites in the visualized upper abdomen.    I have personally reviewed the examination and initial interpretation  and I agree with the findings.    GABRIELLA SNELL MD         SYSTEM ID:  [id number]   XR Chest Port 1 View    Narrative    EXAM: XR CHEST PORT 1  VIEW  7/19/2022 6:09 AM    HISTORY: 62 years Female bilateral chest tubes with biapical pleural  effusions s/p bilateral lung transplant.     COMPARISON: Chest CT without contrast, chest radiograph 7/18/2022.    TECHNIQUE: Portable AP view of the chest.    FINDINGS:   Enteric and feeding tubes courses below the field of view. Right IJ  CVC tip is at the high to mid SVC. Clamshell sternotomy wires appear  intact. Stable bibasilar chest tube positioning.    Midline trachea. Stable cardiomediastinal silhouette mitral annular  calcifications.. Distinct pulmonary vasculature. Small left  costophrenic haziness. Similar biapical small pleural effusions.  Stable lung volumes. No focal airspace opacities. Unremarkable upper  abdomen. No acute or suspicious osseous abnormalities. Unremarkable  soft tissues.      Impression    IMPRESSION:   1.  Stable small left greater than right hydropneumothoraces.  2.  Stable support devices.    I have personally reviewed the examination and initial interpretation  and I agree with the findings.    ALVINA HARRY MD         SYSTEM ID:  [id number]   NM Gastric Emptying    Narrative    EXAM:  NM GASTRIC EMPTYING, 7/20/2022 2:16 PM  HISTORY: Post lung transplant, concern for gastroparesis    TECHNIQUE: The patient ingested 2.3mCi mCi of Tc-99m sulfur colloid in  1 egg, 1 toast with jelly . Static images were acquired at  approximately 1 hour intervals out through 4 hours using a dual head  gamma camera in an anterior and posterior position. The calculation of  emptying was based on dual head imaging with geometric mean  calculations.  A Linear square fit was calculated to the emptying  curve to determine the amount of residual activity at each time point.    FINDINGS:   Percent retention at 60 min = 100%.  Percent retention at 120 min = 98%.  Percent retention at 180 min = 95%.  Percent retention at 240 min =95%.    T1/2 is greater than 240 minutes.      Impression    IMPRESSION: Delayed  gastric emptying.    =====================  Reference Range:  Gastric emptying  - 30 minutes: <70% of retention (> 30% emptying) suggests abnormally     fast emptying.  - 60 minutes: <90% retention (>10% emptying) is normal; less than 30%     retention (>70% emptying) suggests abnormally rapid emptying.  - 90 minutes: <65% retention (> 35% emptying) is normal.  - 120 minutes: <60% retention (> 40% emptying) is normal.  - 180 minutes: <30% retention (> 70% emptying) is normal.  - 240 minutes: <10% retention (> 90% emptying) is normal.    Gastric emptying T-1/2:  Solid: The normal range is  minutes  Liquid only: Normal range is 10-45 minutes.  Liquid only-children: At 60 minutes, normal range is 44-58 % .  Liquid only-infants: At 60 minutes, normal range is 32-64 %.      I have personally reviewed the examination and initial interpretation  and I agree with the findings.    MIGUELITO NORWOOD MD         SYSTEM ID:  [id number]

## 2022-07-20 NOTE — PROGRESS NOTES
Brief Nephrology Note        Mrs Rodriguez' Cr is a bit improved today and UOP is increasing, was at gastric emptying study on my visit this am so I did not have a chance to examine her.  Discussed with team, will give 1L of LR today as she was NPO for study, assuming her Cr is down again tomorrow I will pull her temp line.      RUFUS Cedeño CNS  Clinical Nurse Specialist  [phone]

## 2022-07-20 NOTE — PLAN OF CARE
Major Shift Events: AOx4. Follows commands and moves all extremities. Afebrile. SR to ST. HR 90s-100s. On BiPap overnight, 30% 15/5. Both chest tubes to water seal. BM x1. Oliguric. NPO at midnight for gastric emptying study. NGT to LIS. Pain well controlled with simethicone. Heparin gtt running.    Plan: Gastric emptying study. Continue with current cares.    For vital signs and complete assessments, please see documentation flowsheets.

## 2022-07-20 NOTE — PROGRESS NOTES
CLINICAL NUTRITION SERVICES - REASSESSMENT NOTE     Nutrition Prescription    RECOMMENDATIONS FOR MDs/PROVIDERS TO ORDER:  -Provider to continue to manage fluid status.   -Replace nasal bridle with Delmar style adhesive bridle vs consider another consult for [facility] nurse, they had signed off on 7/18 but pt states she has continued discomfort and chafing from FT.     Malnutrition Status:    -Patient does not meet two of the established criteria necessary for diagnosing malnutrition but is at risk for malnutrition    Recommendations already ordered by Registered Dietitian (RD):  -None additionally at this time.     Future/Additional Recommendations:  -If pt to resume PO diet, recommend helping provide pt with snacks / supplements between meals to help promote small frequent meal patterns for pt given new found delayed gastric emptying per gastric emptying study completed today.   -Continue to monitor stooling, wt trends, TF tolerance, and ability to advance PO diet. Recommend calorie counts when diet order > CLD and pt tolerating PO intake to determine ability to wean / discontinue TF.      EVALUATION OF THE PROGRESS TOWARD GOALS   Diet:   NPO currently and during most of pts admission, pt was allowed regular diet 7/16-7/19.   Nutrition Support:   Currently receiving: Novasource Renal @ 35 ml/hr (840 ml) + 1 pkt Prosource daily provides 1720 kcal (30 kcal/kg), 87 g pro (1.5 g/kg), 154 g CHO, 602 ml free water, and 0 g fiber daily   Intake:   Average TF intake over past 7 days (7/13-7/19):  Over the past week, pt received an average of 581 mL formula, and an average of 1 packet ProSource / day. This provided pt with an average of 1162 kcals, 53g protein, 106g CHO, 58g fat, 417 mL free water.   Including modulars, pt received an average of 1202 kcals (21.5 kcal / kg), and 64g protein (1.14 g/ kg), meeting 86% of minimum estimated kcal needs, and 85% of minimum estimated protein needs.    NEW FINDINGS   -Declining renal  "fx  --BUN 41.1 (7/17) -> 87.0 (7/20).   --Phosphorous 4.1 (7/17) -> 7.2 (7/20)  -Pt participated in gastric emptying study today, pt with 95% gastric content retention 4 hours postprandial indicating delayed gastric emptying.   -Left pleural chest tube pulled today, pt continues to tolerate pulled pericardial tube since 7/15.   -LBM 7/20 0200 AM, loose / watery and brown / black. Continues to have diarrhea likely 2/2 C-diff infection.   -Pt states she has continued discomfort from NDT or bridle in right nare, writer unable to visualize any redness or skin breakdown at site. WOC visited pt 7/18, signed off on pt, though this was before pt had 2x NG/DT's.     -Wt trends:  07/19/22 0600 72.2 kg (159 lb 2.8 oz) Bed scale   07/18/22 0531 71.6 kg (157 lb 13.6 oz) Bed scale   07/17/22 0000 71.6 kg (157 lb 13.6 oz) Bed scale   07/16/22 0000 71.4 kg (157 lb 6.5 oz) Bed scale   07/15/22 0000 74.5 kg (164 lb 3.9 oz) Bed scale   07/14/22 0800 75.5 kg (166 lb 7.2 oz) Bed scale   07/13/22 0400 75.6 kg (166 lb 10.7 oz) Bed scale   07/11/22 0400 74.2 kg (163 lb 9.3 oz) Bed scale   07/10/22 0600 76.7 kg (169 lb 1.5 oz) Bed scale   07/09/22 0000 74.8 kg (164 lb 14.5 oz) Bed scale   07/08/22 0358 76.2 kg (167 lb 15.9 oz) --   07/07/22 0630 74.8 kg (164 lb 14.5 oz) --   07/07/22 0100 74.8 kg (164 lb 14.5 oz) Bed scale   07/06/22 0000 72.9 kg (160 lb 11.2 oz) Bed scale   07/04/22 0045 76.2 kg (167 lb 15.9 oz) Bed scale   07/01/22 0400 75.2 kg (165 lb 12.6 oz) Bed scale   06/30/22 0200 75.6 kg (166 lb 10.7 oz) Bed scale   06/29/22 0400 -- Bed scale   06/28/22 1114 65.7 kg (144 lb 14.4 oz) --     MALNUTRITION  % Intake: Decreased intake does not meet criteria, pt able to meet at least 75% of estimated kcal and protein needs via TF.   % Weight Loss: Does not meet criteria, pt admitted at 144.9 lbs, pt reports UBW \"around 150 lbs\".   Subcutaneous Fat Loss: None observed  Muscle Loss: None observed   Fluid Accumulation/Edema: Moderate " BLE  Malnutrition Diagnosis: Patient does not meet two of the established criteria necessary for diagnosing malnutrition but is at risk for malnutrition    Previous Goals   Total avg nutritional intake to meet a minimum of 25 kcal/kg and 1.3 g PRO/kg daily (per dosing wt 56 kg).  Evaluation: Not met    Previous Nutrition Diagnosis  Inadequate oral intake related to NPO status as evidenced by need for EN to meet 100% nutrition needs.    Evaluation: No change    CURRENT NUTRITION DIAGNOSIS  Inadequate oral intake related to NPO status as evidenced by need for EN to meet 100% nutrition needs.      INTERVENTIONS  Implementation  Enteral Nutrition - maintaining current regimen.   Nutrition education for nutrition relationship to health/disease.    Goals  Total avg nutritional intake to meet a minimum of 25 kcal/kg and 1.3 g PRO/kg daily (per dosing wt 56 kg).    Monitoring/Evaluation  Progress toward goals will be monitored and evaluated per protocol.    Parveen Aviles, RD, LD  6D RD pager: [phone]  Weekend/Holiday RD pager: [phone]

## 2022-07-21 ENCOUNTER — APPOINTMENT (OUTPATIENT)
Dept: GENERAL RADIOLOGY | Facility: CLINIC | Age: 62
DRG: 007 | End: 2022-07-21
Attending: STUDENT IN AN ORGANIZED HEALTH CARE EDUCATION/TRAINING PROGRAM
Payer: MEDICARE

## 2022-07-21 LAB
ALBUMIN SERPL BCG-MCNC: 2.8 G/DL (ref 3.5–5.2)
ALP SERPL-CCNC: 74 U/L (ref 35–104)
ALT SERPL W P-5'-P-CCNC: 18 U/L (ref 10–35)
AMMONIA PLAS-SCNC: 37 UMOL/L (ref 11–51)
ANION GAP SERPL CALCULATED.3IONS-SCNC: 12 MMOL/L (ref 7–15)
ANION GAP SERPL CALCULATED.3IONS-SCNC: 22 MMOL/L (ref 7–15)
AST SERPL W P-5'-P-CCNC: 17 U/L (ref 10–35)
BASE EXCESS BLDV CALC-SCNC: 11.3 MMOL/L (ref -7.7–1.9)
BASOPHILS # BLD AUTO: 0 10E3/UL (ref 0–0.2)
BASOPHILS NFR BLD AUTO: 0 %
BILIRUB DIRECT SERPL-MCNC: <0.2 MG/DL (ref 0–0.3)
BILIRUB SERPL-MCNC: 0.2 MG/DL
BUN SERPL-MCNC: 86.6 MG/DL (ref 8–23)
BUN SERPL-MCNC: 89.3 MG/DL (ref 8–23)
CALCIUM SERPL-MCNC: 7.9 MG/DL (ref 8.8–10.2)
CALCIUM SERPL-MCNC: 8.6 MG/DL (ref 8.8–10.2)
CHLORIDE SERPL-SCNC: 95 MMOL/L (ref 98–107)
CHLORIDE SERPL-SCNC: 96 MMOL/L (ref 98–107)
CREAT SERPL-MCNC: 1.46 MG/DL (ref 0.51–0.95)
CREAT SERPL-MCNC: 1.62 MG/DL (ref 0.51–0.95)
DEPRECATED HCO3 PLAS-SCNC: 25 MMOL/L (ref 22–29)
DEPRECATED HCO3 PLAS-SCNC: 32 MMOL/L (ref 22–29)
EOSINOPHIL # BLD AUTO: 0.1 10E3/UL (ref 0–0.7)
EOSINOPHIL NFR BLD AUTO: 1 %
ERYTHROCYTE [DISTWIDTH] IN BLOOD BY AUTOMATED COUNT: 17.6 % (ref 10–15)
FERRITIN SERPL-MCNC: 189 NG/ML (ref 11–328)
GFR SERPL CREATININE-BSD FRML MDRD: 36 ML/MIN/1.73M2
GFR SERPL CREATININE-BSD FRML MDRD: 41 ML/MIN/1.73M2
GLUCOSE BLDC GLUCOMTR-MCNC: 158 MG/DL (ref 70–99)
GLUCOSE BLDC GLUCOMTR-MCNC: 187 MG/DL (ref 70–99)
GLUCOSE BLDC GLUCOMTR-MCNC: 197 MG/DL (ref 70–99)
GLUCOSE BLDC GLUCOMTR-MCNC: 204 MG/DL (ref 70–99)
GLUCOSE BLDC GLUCOMTR-MCNC: 224 MG/DL (ref 70–99)
GLUCOSE SERPL-MCNC: 151 MG/DL (ref 70–99)
GLUCOSE SERPL-MCNC: 164 MG/DL (ref 70–99)
HCO3 BLDV-SCNC: 39 MMOL/L (ref 21–28)
HCT VFR BLD AUTO: 27.6 % (ref 35–47)
HGB BLD-MCNC: 8.4 G/DL (ref 11.7–15.7)
IMM GRANULOCYTES # BLD: 0.1 10E3/UL
IMM GRANULOCYTES NFR BLD: 1 %
IRON BINDING CAPACITY (ROCHE): 285 UG/DL (ref 240–430)
IRON SATN MFR SERPL: 11 % (ref 15–46)
IRON SERPL-MCNC: 31 UG/DL (ref 37–145)
LDH SERPL L TO P-CCNC: 270 U/L (ref 0–250)
LYMPHOCYTES # BLD AUTO: 0.2 10E3/UL (ref 0.8–5.3)
LYMPHOCYTES NFR BLD AUTO: 2 %
MAGNESIUM SERPL-MCNC: 2.6 MG/DL (ref 1.7–2.3)
MCH RBC QN AUTO: 31.1 PG (ref 26.5–33)
MCHC RBC AUTO-ENTMCNC: 30.4 G/DL (ref 31.5–36.5)
MCV RBC AUTO: 102 FL (ref 78–100)
MONOCYTES # BLD AUTO: 0.5 10E3/UL (ref 0–1.3)
MONOCYTES NFR BLD AUTO: 5 %
NEUTROPHILS # BLD AUTO: 8.4 10E3/UL (ref 1.6–8.3)
NEUTROPHILS NFR BLD AUTO: 91 %
NRBC # BLD AUTO: 0 10E3/UL
NRBC BLD AUTO-RTO: 0 /100
O2/TOTAL GAS SETTING VFR VENT: 21 %
PCO2 BLDV: 67 MM HG (ref 40–50)
PH BLDV: 7.37 [PH] (ref 7.32–7.43)
PHOSPHATE SERPL-MCNC: 5 MG/DL (ref 2.5–4.5)
PLATELET # BLD AUTO: 305 10E3/UL (ref 150–450)
PO2 BLDV: 50 MM HG (ref 25–47)
POTASSIUM SERPL-SCNC: 3.7 MMOL/L (ref 3.4–5.3)
POTASSIUM SERPL-SCNC: 4.2 MMOL/L (ref 3.4–5.3)
PROT SERPL-MCNC: 4.9 G/DL (ref 6.4–8.3)
PROT SERPL-MCNC: 4.9 G/DL (ref 6.4–8.3)
RBC # BLD AUTO: 2.7 10E6/UL (ref 3.8–5.2)
SODIUM SERPL-SCNC: 139 MMOL/L (ref 136–145)
SODIUM SERPL-SCNC: 143 MMOL/L (ref 136–145)
TACROLIMUS BLD-MCNC: 24.5 UG/L (ref 5–15)
TME LAST DOSE: ABNORMAL H
TME LAST DOSE: ABNORMAL H
UFH PPP CHRO-ACNC: 0.15 IU/ML
UFH PPP CHRO-ACNC: 0.36 IU/ML
UFH PPP CHRO-ACNC: 0.4 IU/ML
WBC # BLD AUTO: 9.2 10E3/UL (ref 4–11)

## 2022-07-21 PROCEDURE — 36415 COLL VENOUS BLD VENIPUNCTURE: CPT | Performed by: STUDENT IN AN ORGANIZED HEALTH CARE EDUCATION/TRAINING PROGRAM

## 2022-07-21 PROCEDURE — 999N000157 HC STATISTIC RCP TIME EA 10 MIN

## 2022-07-21 PROCEDURE — 250N000012 HC RX MED GY IP 250 OP 636 PS 637: Performed by: PHYSICIAN ASSISTANT

## 2022-07-21 PROCEDURE — 250N000013 HC RX MED GY IP 250 OP 250 PS 637: Performed by: SURGERY

## 2022-07-21 PROCEDURE — 250N000013 HC RX MED GY IP 250 OP 250 PS 637: Performed by: THORACIC SURGERY (CARDIOTHORACIC VASCULAR SURGERY)

## 2022-07-21 PROCEDURE — 80053 COMPREHEN METABOLIC PANEL: CPT | Performed by: NURSE PRACTITIONER

## 2022-07-21 PROCEDURE — 82140 ASSAY OF AMMONIA: CPT | Performed by: NURSE PRACTITIONER

## 2022-07-21 PROCEDURE — 71045 X-RAY EXAM CHEST 1 VIEW: CPT

## 2022-07-21 PROCEDURE — 250N000013 HC RX MED GY IP 250 OP 250 PS 637

## 2022-07-21 PROCEDURE — 250N000012 HC RX MED GY IP 250 OP 636 PS 637: Performed by: THORACIC SURGERY (CARDIOTHORACIC VASCULAR SURGERY)

## 2022-07-21 PROCEDURE — 84100 ASSAY OF PHOSPHORUS: CPT | Performed by: SURGERY

## 2022-07-21 PROCEDURE — 120N000002 HC R&B MED SURG/OB UMMC

## 2022-07-21 PROCEDURE — 250N000013 HC RX MED GY IP 250 OP 250 PS 637: Performed by: STUDENT IN AN ORGANIZED HEALTH CARE EDUCATION/TRAINING PROGRAM

## 2022-07-21 PROCEDURE — 94640 AIRWAY INHALATION TREATMENT: CPT | Mod: 76

## 2022-07-21 PROCEDURE — 250N000011 HC RX IP 250 OP 636: Performed by: STUDENT IN AN ORGANIZED HEALTH CARE EDUCATION/TRAINING PROGRAM

## 2022-07-21 PROCEDURE — 85520 HEPARIN ASSAY: CPT | Performed by: INTERNAL MEDICINE

## 2022-07-21 PROCEDURE — 94640 AIRWAY INHALATION TREATMENT: CPT

## 2022-07-21 PROCEDURE — 250N000012 HC RX MED GY IP 250 OP 636 PS 637: Performed by: NURSE PRACTITIONER

## 2022-07-21 PROCEDURE — 83735 ASSAY OF MAGNESIUM: CPT | Performed by: SURGERY

## 2022-07-21 PROCEDURE — 85520 HEPARIN ASSAY: CPT | Performed by: STUDENT IN AN ORGANIZED HEALTH CARE EDUCATION/TRAINING PROGRAM

## 2022-07-21 PROCEDURE — 99233 SBSQ HOSP IP/OBS HIGH 50: CPT | Performed by: STUDENT IN AN ORGANIZED HEALTH CARE EDUCATION/TRAINING PROGRAM

## 2022-07-21 PROCEDURE — 99233 SBSQ HOSP IP/OBS HIGH 50: CPT | Mod: 24 | Performed by: NURSE PRACTITIONER

## 2022-07-21 PROCEDURE — 94668 MNPJ CHEST WALL SBSQ: CPT

## 2022-07-21 PROCEDURE — 85004 AUTOMATED DIFF WBC COUNT: CPT | Performed by: NURSE PRACTITIONER

## 2022-07-21 PROCEDURE — 82803 BLOOD GASES ANY COMBINATION: CPT | Performed by: NURSE PRACTITIONER

## 2022-07-21 PROCEDURE — 250N000013 HC RX MED GY IP 250 OP 250 PS 637: Performed by: NURSE PRACTITIONER

## 2022-07-21 PROCEDURE — 71045 X-RAY EXAM CHEST 1 VIEW: CPT | Mod: 26 | Performed by: RADIOLOGY

## 2022-07-21 PROCEDURE — 82728 ASSAY OF FERRITIN: CPT | Performed by: CLINICAL NURSE SPECIALIST

## 2022-07-21 PROCEDURE — 82248 BILIRUBIN DIRECT: CPT | Performed by: NURSE PRACTITIONER

## 2022-07-21 PROCEDURE — 250N000009 HC RX 250: Performed by: SURGERY

## 2022-07-21 PROCEDURE — 80197 ASSAY OF TACROLIMUS: CPT | Performed by: NURSE PRACTITIONER

## 2022-07-21 PROCEDURE — 83550 IRON BINDING TEST: CPT | Performed by: STUDENT IN AN ORGANIZED HEALTH CARE EDUCATION/TRAINING PROGRAM

## 2022-07-21 PROCEDURE — 36415 COLL VENOUS BLD VENIPUNCTURE: CPT | Performed by: INTERNAL MEDICINE

## 2022-07-21 PROCEDURE — 83615 LACTATE (LD) (LDH) ENZYME: CPT | Performed by: NURSE PRACTITIONER

## 2022-07-21 PROCEDURE — 94660 CPAP INITIATION&MGMT: CPT

## 2022-07-21 RX ADMIN — TACROLIMUS 5 MG: 5 CAPSULE ORAL at 17:24

## 2022-07-21 RX ADMIN — THERA TABS 1 TABLET: TAB at 12:09

## 2022-07-21 RX ADMIN — CALCIUM CARBONATE 600 MG (1,500 MG)-VITAMIN D3 400 UNIT TABLET 1 TABLET: at 17:27

## 2022-07-21 RX ADMIN — LEVALBUTEROL HYDROCHLORIDE 1.25 MG: 1.25 SOLUTION RESPIRATORY (INHALATION) at 15:06

## 2022-07-21 RX ADMIN — Medication 5 MG: at 21:38

## 2022-07-21 RX ADMIN — SIMETHICONE 80 MG: 80 TABLET, CHEWABLE ORAL at 13:43

## 2022-07-21 RX ADMIN — NYSTATIN: 100000 CREAM TOPICAL at 08:34

## 2022-07-21 RX ADMIN — Medication 1 PACKET: at 12:09

## 2022-07-21 RX ADMIN — LEVALBUTEROL HYDROCHLORIDE 1.25 MG: 1.25 SOLUTION RESPIRATORY (INHALATION) at 08:53

## 2022-07-21 RX ADMIN — INSULIN ASPART 4 UNITS: 100 INJECTION, SOLUTION INTRAVENOUS; SUBCUTANEOUS at 04:13

## 2022-07-21 RX ADMIN — NYSTATIN 1000000 UNITS: 100000 SUSPENSION ORAL at 08:31

## 2022-07-21 RX ADMIN — ACETAMINOPHEN 975 MG: 325 TABLET, FILM COATED ORAL at 06:09

## 2022-07-21 RX ADMIN — ACETYLCYSTEINE 2 ML: 200 SOLUTION ORAL; RESPIRATORY (INHALATION) at 12:09

## 2022-07-21 RX ADMIN — ASPIRIN 81 MG CHEWABLE TABLET 81 MG: 81 TABLET CHEWABLE at 09:14

## 2022-07-21 RX ADMIN — LEVALBUTEROL HYDROCHLORIDE 1.25 MG: 1.25 SOLUTION RESPIRATORY (INHALATION) at 12:09

## 2022-07-21 RX ADMIN — LEVALBUTEROL HYDROCHLORIDE 1.25 MG: 1.25 SOLUTION RESPIRATORY (INHALATION) at 20:53

## 2022-07-21 RX ADMIN — TACROLIMUS 5 MG: 5 CAPSULE ORAL at 08:29

## 2022-07-21 RX ADMIN — CALCIUM CARBONATE 600 MG (1,500 MG)-VITAMIN D3 400 UNIT TABLET 1 TABLET: at 09:14

## 2022-07-21 RX ADMIN — VANCOMYCIN HYDROCHLORIDE 125 MG: KIT at 03:40

## 2022-07-21 RX ADMIN — METOPROLOL TARTRATE 25 MG: 25 TABLET, FILM COATED ORAL at 08:21

## 2022-07-21 RX ADMIN — ACETAMINOPHEN 975 MG: 325 TABLET, FILM COATED ORAL at 13:42

## 2022-07-21 RX ADMIN — ACETYLCYSTEINE 2 ML: 200 SOLUTION ORAL; RESPIRATORY (INHALATION) at 08:53

## 2022-07-21 RX ADMIN — VANCOMYCIN HYDROCHLORIDE 125 MG: KIT at 13:41

## 2022-07-21 RX ADMIN — INSULIN GLARGINE 15 UNITS: 100 INJECTION, SOLUTION SUBCUTANEOUS at 19:45

## 2022-07-21 RX ADMIN — MYCOPHENOLATE MOFETIL 1000 MG: 200 POWDER, FOR SUSPENSION ORAL at 19:42

## 2022-07-21 RX ADMIN — INSULIN ASPART 2 UNITS: 100 INJECTION, SOLUTION INTRAVENOUS; SUBCUTANEOUS at 16:17

## 2022-07-21 RX ADMIN — SIMETHICONE 80 MG: 80 TABLET, CHEWABLE ORAL at 17:27

## 2022-07-21 RX ADMIN — THIAMINE HCL TAB 100 MG 100 MG: 100 TAB at 09:14

## 2022-07-21 RX ADMIN — VANCOMYCIN HYDROCHLORIDE 125 MG: KIT at 09:15

## 2022-07-21 RX ADMIN — INSULIN ASPART 3 UNITS: 100 INJECTION, SOLUTION INTRAVENOUS; SUBCUTANEOUS at 19:45

## 2022-07-21 RX ADMIN — Medication 5 MG: at 03:52

## 2022-07-21 RX ADMIN — MYCOPHENOLATE MOFETIL 1000 MG: 200 POWDER, FOR SUSPENSION ORAL at 08:35

## 2022-07-21 RX ADMIN — ONDANSETRON 4 MG: 4 TABLET, ORALLY DISINTEGRATING ORAL at 08:21

## 2022-07-21 RX ADMIN — INSULIN ASPART 3 UNITS: 100 INJECTION, SOLUTION INTRAVENOUS; SUBCUTANEOUS at 08:38

## 2022-07-21 RX ADMIN — NYSTATIN 1000000 UNITS: 100000 SUSPENSION ORAL at 17:26

## 2022-07-21 RX ADMIN — Medication 40 MG: at 09:16

## 2022-07-21 RX ADMIN — ACETYLCYSTEINE 2 ML: 200 SOLUTION ORAL; RESPIRATORY (INHALATION) at 15:06

## 2022-07-21 RX ADMIN — ACETAMINOPHEN 650 MG: 325 TABLET, FILM COATED ORAL at 17:26

## 2022-07-21 RX ADMIN — INSULIN GLARGINE 15 UNITS: 100 INJECTION, SOLUTION SUBCUTANEOUS at 08:40

## 2022-07-21 RX ADMIN — METOPROLOL TARTRATE 25 MG: 25 TABLET, FILM COATED ORAL at 19:43

## 2022-07-21 RX ADMIN — PREDNISONE 15 MG: 5 TABLET ORAL at 08:22

## 2022-07-21 RX ADMIN — Medication 40 MG: at 19:42

## 2022-07-21 RX ADMIN — SIMETHICONE 80 MG: 80 TABLET, CHEWABLE ORAL at 21:39

## 2022-07-21 RX ADMIN — NYSTATIN: 100000 CREAM TOPICAL at 19:45

## 2022-07-21 RX ADMIN — NYSTATIN 1000000 UNITS: 100000 SUSPENSION ORAL at 13:43

## 2022-07-21 RX ADMIN — SIMETHICONE 80 MG: 80 TABLET, CHEWABLE ORAL at 08:22

## 2022-07-21 RX ADMIN — ACETYLCYSTEINE 2 ML: 200 SOLUTION ORAL; RESPIRATORY (INHALATION) at 20:53

## 2022-07-21 RX ADMIN — Medication 1 CAPSULE: at 19:44

## 2022-07-21 RX ADMIN — Medication 1 CAPSULE: at 09:14

## 2022-07-21 RX ADMIN — Medication 5 MG: at 17:26

## 2022-07-21 RX ADMIN — Medication 1 LOZENGE: at 19:42

## 2022-07-21 RX ADMIN — INSULIN ASPART 1 UNITS: 100 INJECTION, SOLUTION INTRAVENOUS; SUBCUTANEOUS at 12:13

## 2022-07-21 RX ADMIN — PREDNISONE 12.5 MG: 2.5 TABLET ORAL at 19:43

## 2022-07-21 RX ADMIN — Medication 5 MG: at 09:12

## 2022-07-21 RX ADMIN — Medication 5 MG: at 13:43

## 2022-07-21 RX ADMIN — VANCOMYCIN HYDROCHLORIDE 125 MG: KIT at 19:42

## 2022-07-21 ASSESSMENT — ACTIVITIES OF DAILY LIVING (ADL)
ADLS_ACUITY_SCORE: 30

## 2022-07-21 NOTE — PROGRESS NOTES
Brief Nephrology Note      Mrs Higgins's Cr continues to improve, HD line can be pulled which I will plan to do tomorrow am.  Can continue to monitor tacro levels and continue other supportive cares.      Madan Leblanc, RUFUS CNS  Clinical Nurse Specialist  [phone]

## 2022-07-21 NOTE — PROGRESS NOTES
NURSING PROGRESS NOTE  Shift Summary      Date: July 20, 2022     Neuro/Musculoskeletal:  A&Ox4.   Cardiac:  SR.  VSS.     Respiratory:  Sating in the 90s on RA, but will desat at times and need 2L Oxy-mask.  GI/:  Adequate urine output.  LBM: 7/20/22, watery.   Diet/Appetite:  Pt NPO for procedure today, and will remain NPO d/t gastricparesis.  Activity:  Assist of 1, standby to stand and pivot.   Pain:  C/o abdominal pain that is controlled by scheduled acetaminophen.  Skin:  No new deficits noted.   LDAs + Drips/IVF:  Right Chest tube, set to -20 suction. Bilateral PIVs are patent and running fluids.   Protocols/Labs:  Potassium was replaced today. Heparin anti-Xa resulted in a decrease of Heparin by 200 units per hour.     Pertinent Shift Updates:  Patient has a gastric emptying study done today that resulted in discovering gastricparesis.       Plan:  There is a consult in for IR to see pt, in order to assess the need for a PEG-J tube.      Gera Downey RN  .................................................... July 20, 2022   7:04 PM  [facility] ([facility]): [facility] (Unit 6D)

## 2022-07-21 NOTE — PROGRESS NOTES
[facility]    Medicine Progress Note - Hospitalist Service, GOLD TEAM 10    Date of Admission:  6/28/2022    Assessment & Plan          Sofie Rodriguez is a 62 year old female admitted on 6/28/2022. She has PMH of end stage COPD s/p b/l lung transplant on 6/28/2022, HTN, HFpEF, h/o hepC, oteopenia, and former methamphetamine use, and she was transferred to [place] [address] Medicine from MICU on 7/15/2022. She was admitted to the MICU on 7/13/20222 with prior hospital course complicated by left upper extremity acute limb ischemia s/p left radial thrombectomy on 7/1/2022. She was primarily treated for acute hypercapnic respiratory failure on intermittent BIPAP with worsening BACILIO while in the MICU. Currently stable on 2L NC. Scheduled for gastric emptying study today.        Summary of today's plan:   - Goal to get PEGJ but limitation to PEGJ supplies may cause delay  - Plan to remove HD cath in AM after AM labs if ongoing resolution of renal injury  - NPO, NG to LIMS minimal output, NJ for TFs  - DOAC once PEGJ placed  - midodrine still being held, BPs relatively stable  - Continue to encourage OOB and up in chair  - BIPAP at night     End stage COPD s/p BOLT 6/28/2022  Acute hypercapnic hypoxic respiratory failure (improving), likely 2/2 decreased central respiratory drive vs respiratory muscle weakness and some pulmonary edema / fluid Transplanted 6/28. formal SNIFF test was performed on 7/14 showed R hemidiaphragm palsy. Of note transplanted lungs were also considered small for body size and could contribute to decreased respiratory compensation for hypercarbia.  - Continue BIPAP at night   - NC with intermittent BIPAP for night and daytime naps; goal to keep O2sat above 92%  - f/u myasthenia gravis panel  (pending from 7/14)  - unheld oxycodone after gastric emptying study, dosed at 2.5-5mg q4h PRN   - encourage activity and getting up in bed; PT/OT participation  - encourage  chest physiotherapy QID     Post-transplant  Immunosuppression:  - Prednisone 15mg BID  - Tacrolimus 5mg BID (goal 8-12)   - MMF 1000mg BID  Prophylasxis:  - CMV - Valgancyclover  - Thrush - PO nysatin  - PJP - TMP-SMX (currently held given BACILIO); dapsone started 7/18 at 50mg qMWF (will try to increase to daily if tolerable)     Chest tubes  - 1 R chest tube (basilar) in place currently (pericardial drain was removed 7/15, L basilar was removed 7/20). CT chest 7/14 showing unchanged bilateral effusions and unchanged biapical pneumothoraces with resolved left basilar pneumothorax; bibasilar chest tubes in place with pericardial drain (which was removed 7/15). R tube still with ~600mL output over past day (7/20) but L tube with minimal drainage.   - daily CXR  - CV surgery and transplant pulm following     Hypotension, resolved  H/o HTN  H/o HFpEF  Likely vasoplegia post operatively. Last echo 7/7 normal EF with good LV function. S/p hydrocortisone 7/6-7/9 and fludrocortisone 7/607/11 without significant improvement.  - off midorine 7/19  - Holding PTA lasix 20mg daily     C.diff  Abdominal pain   Worsening diarrhea via rectal pouch with new onset abd pain that is significantly worse as of 7/15. C. Diff positive on 7/11 with vanc started on 7/12. CT abd 7/15 showed no signs of toxic megacolon, but showed very distended stomach; NG was placed 7/15 for decompression. Patient is symptomatically improved while on intermittent suction.  - PO vanc 125mg QID (7/12-7/26)  - holding bowel regimen  - gastric emptying study 7/20 showed delayed gastric emptying with retention of 95% of stomach contents after 4 hours; please keep patient NPO except tube feeds and meds  - if abd pain improves tomorrow, will consider trial of reglan  - unheld oxycodone after gastric emptying study  - Simethicone scheduled     NJ tube  Feeding regimen:   - Adult Formula Drip Feeding: Continuous Novasource Renal; Nasojejunal; Goal Rate: 35; initiate at  goal rate; mL/hr; Medication - Feeding Tube Flush Frequency: At least 15-30 mL water before and after medication administration and with tube clogging     BACILIO  Hypermagnesemia  Hyperphosphatemia  Baseline renal function was normal prior to surgery. New onset renal failure after transplant, likely multifactorial due to pre-renal hypotension, less likely nephrotoxic agents.  - intermittent HD PRN but anticipate line removal 7/22 given ongoing renal recovery  - s/p 1x 120mg lasix on 7/18 with ~600mL UOP over 24hrs  - R internal jugular non-tunneled cathether placed with HD line on 7/14  - question of accuracy of urine output given often stooling and urinating simultaneously in commode     Tachyarrthymia  Paroxysmal afib  Paroxysmal aflutter/AT  SVT vs afib.Had an occurrence during HD on 7/14. Had afib with RVR to 200s on 7/17. EP consult placed.asmyptomatic and stable during episodes. Aflutter episode (7/17) with transfer. Vagal maneuver responsive at time.   - Per EP: patient has had episodes of possible flutter (vs AT with 2:1 conduction) and afib with RVR  - heparin drip and transition to DOAC when safe per surgery (CHADS-VASc score of 2)  - On telemetry  - On metoprolol tartrate 25mg BID  - Discharge on ziopatch for 14 days with EP follow up in 1-2 months after     Stress-induced hyperglycemia  -177 over past 24hrs.   - on 15U glargine BID; continue today and re-evaluate as needed     Acute Blood Loss Anemia, stable  Initially from acute blood loss. Hb dropped to 6.8 on 7/14, requiring 1U pRBC. Post-transfusion Hb 9.4 > 8.3 > 8.6 > 8.7 (7/18).   - continue to monitor  - transfuse for hgb<7      Diet: Adult Formula Drip Feeding: Continuous Novasource Renal; Nasojejunal; Goal Rate: 35; initiate at 15 ml/hr and advance by 10 mL Q8H to goal; mL/hr; Medication - Feeding Tube Flush Frequency: At least 15-30 mL water before and after medication admin...  NPO per Anesthesia Guidelines for Procedure/Surgery Except  for: Meds, Ice Chips, NPO but receiving Tube Feeding    DVT Prophylaxis: heparin gtt  Dong Catheter: Not present  Central Lines: PRESENT  CVC Double Lumen Right Internal jugular Non - tunneled-Site Assessment: WDL  Cardiac Monitoring: None  Code Status: Full Code      Disposition Plan           The patient's care was discussed with the Patient.    Catalino Preciado DO  Hospitalist Service, GOLD TEAM 10  [facility]  Securely message with the Vocera Web Console (learn more here)  Text page via [facility] Paging/Directory   Please see signed in provider for up to date coverage information      Clinically Significant Risk Factors Present on Admission                      ______________________________________________________________________    Interval History   Doing superb today while up in the chair, wants lines and tubes removed, understands need, no overt complaint, some mild abdominal pain.    Data reviewed today: I reviewed all medications, new labs and imaging results over the last 24 hours. I personally reviewed no images or EKG's today.    Physical Exam   Vital Signs: Temp: 97.8  F (36.6  C) Temp src: Oral BP: 107/58 Pulse: 101   Resp: 12 SpO2: 100 % O2 Device: Oxymask Oxygen Delivery: 2 LPM  Weight: 158 lbs 8 oz  General: non-distressed adult  HEENT: Normocephalic, atraumatic, pupils equal round reactive, membranes moist. HD cath RIJ, NGT, NJT  CV: normal capillary refill, heart regular rate and rhythm, tele  Respiratory: Non-labored breathing, bronchovesicular lung sounds, R Chest Tube noted, draining serosangiunous.   Abdominal: soft, mildly tender in the epigastrium, no rebound, no guarding, no rigidity, +bowel sounds  Genitourinary: no suprapubic tenderness  Musculoskeletal: normal bulk and tone  Skin: no rash, normal turgor  Neurologic: no facial droop, moving upper and lower extremities spontaneously, sensation in tact to light touch on extremities  Psychiatric:  normal mood and affect    Data   Recent Labs   Lab 07/21/22  1616 07/21/22  1211 07/21/22  0955 07/21/22  0413 07/21/22  0122 07/20/22  0752 07/20/22  0648 07/19/22  0810 07/19/22  0721 07/18/22  0537 07/18/22  0532 07/15/22  0422 07/15/22  0420   WBC  --   --  9.2  --   --   --  8.5  --  10.9   < > 10.5   < >  --    HGB  --   --  8.4*  --   --   --  9.1*  --  8.3*   < > 8.7*   < >  --    MCV  --   --  102*  --   --   --  100  --  99   < > 98   < >  --    PLT  --   --  305  --   --   --  264  --  243   < > 186   < >  --    NA  --   --  139  --  143  --  135*  --  135*  --  131*   < > 135*   POTASSIUM  --   --  3.7  --  4.2  --  3.6  --  3.5  --  4.3   < > 4.6   CHLORIDE  --   --  95*  --  96*  --  92*  --  94*  --  94*   < > 93*   CO2  --   --  32*  --  25  --  31*  --  34*  --  31*   < > 34*   BUN  --   --  89.3*  --  86.6*  --  87.0*  --  80.7*  --  59.5*   < > 123.0*   CR  --   --  1.46*  --  1.62*  --  1.80*  --  2.06*  --  2.04*   < > 2.65*   ANIONGAP  --   --  12  --  22*  --  12  --  7  --  6*   < > 8   ESTUARDO  --   --  8.6*  --  7.9*  --  8.5*  --  8.5*  --  8.6*   < > 8.7*   * 158* 164*   < > 151*   < > 101*   < > 104*   < > 202*   < > 245*   ALBUMIN  --   --  2.8*  --   --   --   --   --   --   --  2.8*  --  2.9*   PROTTOTAL  --   --  4.9*  --   --   --  4.9*  --   --   --  4.7*  --  4.9*   BILITOTAL  --   --  0.2  --   --   --   --   --   --   --  0.2  --  0.2   ALKPHOS  --   --  74  --   --   --   --   --   --   --  85  --  74   ALT  --   --  18  --   --   --   --   --   --   --  21  --  32   AST  --   --  17  --   --   --   --   --   --   --  19  --  31   LIPASE  --   --   --   --   --   --   --   --   --   --   --   --  21    < > = values in this interval not displayed.

## 2022-07-21 NOTE — PROGRESS NOTES
Cardiovascular Surgery Progress Note  07/21/2022         Assessment and Plan:      * Chart Check Note *    Sofie is a 62 F PMH of oxygen-dependent COPD, HFpEF, HTN, HCV, and osteopenia who underwent bilateral lung transplant on 6/28/22 with Dr. Sunshine. This was complicated by left upper extremity acute limb ischemia s/p left radial thrombectomy on 7/1/22. Recovering renal function, will hold off on tunneled dialysis line at this time.       - Right Pleural tube remains to suction, elevated output, can ambulate off suction. Keep tube today 7/21.   - PULLED left pleural tube 7/20, she may have significant drainage from the tube sites. Has small left lower pleural effusion, continue to monitor.  - Has bilateral apical pleural effusions, lungs did not fill chest space per surgeon.     Discussed with Dr Sunshine through both written and verbal communication.      Andres Wilson PA-C  Cardiothoracic Surgery  Pager [phone]    9:15 AM   July 20, 2022

## 2022-07-21 NOTE — PROGRESS NOTES
Pulmonary Medicine  Cystic Fibrosis - Lung Transplant Team  Progress Note  2022       Patient: Sofie Rodriguez  MRN: [id number]  : 1960 (age 62 year old)  Transplant: 2022 (Lung), POD#23  Admission date: 2022    Assessment & Plan:     Sofie Rodriguez is a 62 year old female with a PMH significant for end-stage COPD, HTN, HFpEF, Mycobacterium peregrinum colonization, h/o hepatitis C, HECTOR s/p LEEP procedure, osteopenia, and former methamphetamine use.  Pt. is now s/p BSLT on 22, lungs slightly undersized.   Persistent low dose pressor needs post-op through 7/10.  Extubated POD #2 but with persistent hypercapnia, mostly compensated and only slightly improved with intermittent NIPPV.  Also with left radial artery thrombus (presumed secondary to arterial line) s/p thrombectomy /, BACILIO, and C diff.  Rehabilitation and airway clearance limited by dysrhythmia and gastric discomfort.     Today's recommendations:  - CPT QID, encourage consistent participation d/t concern for ineffective airway clearance  - Following pleural cultures, right chest tube to remain given output  - Encourage increased activity including up in chair (minimal day time in bed)  - Tacrolimus level today collected very late and after AM dose, disregard and repeat level ordered for tomorrow  - Prednisone tapered today per protocol  - Increase dapsone to daily after one week () if hgb remains stable able  - Transition to DOAC after PEG/J placement  - Continue with NG to LIS, IR to place PEG/J tube (delayed by supply chain issue, timeline TBD)  - DSA, EBV, IgG, and donor risk labs ordered      S/p bilateral sequential lung transplant (BSLT) for end stage COPD:  Persistent hypercapneic respiratory failure:   Persistent hypotension:   Bilateral hydroPTX:  Right hemidiaphragm palsy: Explant pathology with severe emphysema with subpleural bullae formation, changes of chronic bronchitis, subpleural scars and patchy  pulmonary edema; benign hilar lymph nodes.  No evidence of PGD post-op.  Extubated 6/30.  Persistent hypercapnia without dyspnea, appears to be a respiratory drive problem.  Some improvement with BiPAP, limited tolerance in part due to anxiety.  Persistent low dose pressors weaned off 7/10, on scheduled midodrine (also s/p hydrocortisone 7/6-7/9 and fludrocortisone 7/6-7/11).  NIFF (7/14) notable for right hemidiaphragm palsy.  Chest CT (7/18) with bilateral biapical effusions R>L and improved LLL atelectasis, also with LLL hydroPTX and bilateral PTX; bilateral chest tubes not communicating well with loculated effusion areas (personally reviewed).  Bronch (7/19), no bronch note recorded yet.  On 1L NC to RA during the day with BiPAP overnight (improvement in hypercapnea).  - DSA one month post-transplant (7/28, ordered)  - Ammonia monitoring twice weekly (screening for hyperammonemia post-lung transplant)  - Nebs: levalbuterol and Mucomyst QID   - Pulmonary toilet with chest physiotherapy QID, encourage consistent participation d/t concern for ineffective airway clearance (inconsistent/infrequent use post-op)  - Aerobika and incentive spirometry hourly while awake  - Supplemental oxygen as needed to maintain SpO2 >92% (wean as able), NIPPV overnight given persistent hypercapnia  - Chest tubes managed by surgical team, right tube remains with moderate output  - Aggressive volume removal as able per nephrology  - Encourage increased activity including up in chair (minimal day time in bed) and active participation in PT/OT given concern for deconditioning and ineffective airway clearance     Immunosuppression:  Induction therapy with basiliximab (and high dose IV steroid) given intraoperatively, repeating basiliximab dose on POD#4.  - Tacrolimus 5 mg BID (via NJ tube, held 7/11-7/12 for supratherapeutic level, peak level appropriate 7/11).  Goal level 8-12.  Tacrolimus level today collected very late and after AM dose,  disregard and repeat level ordered for tomorrow.  - MMF 1000 mg BID (decreased 7/10 due to diarrhea)  - Prednisone tapered today to 15 mg qAM / 12.5 mg qPM, next taper due 7/28  Date AM dose (mg) PM dose (mg)   7/14/22 15 15   7/21/22 15 12.5   7/28/22 12.5 12.5   8/4/22 12.5 10   8/18/22 10 10   9/15/22 10 7.5   10/13/22 7.5 7.5   11/10/22 7.5 5   12/8/22 5 5   1/5/23 5 2.5      Prophylaxis:   - Dapsone for PJP ppx (7/18), increase to daily after one week if hgb remains stable able (Bactrim held 7/7 for BACILIO)  - VGCV for CMV ppx  - Nystatin for oral candidiasis ppx, 6 month course  - See below for serologies and viral ppx:    Donor Recipient Intervention   CMV status Positive Positive Valganciclovir POD #8-90   EBV status Positive Positive EBV check monthly (7/28, ordered)   HSV status N/A Positive Not indicated      ID:  H/o M. peregrinum colonization: Donor bronch cultures (OSH) with Strep beta hemolytic (not group A).  Recipient cultures as below.  - IgG at one month (7/28, ordered)  - Bronch cultures (7/12) NGTD, follow  - AFB bronch culture (6/28, 6/29) NGTD, AFB to be sent on all future bronchs  - Right pleural cultures (7/20) NGTD     Streptococcus pneumoniae:  Stenotrophomonas maltophilia: Noted in recipient cultures at time of transplant.  S/p ceftazidime 6/28-7/10, vancomycin 7/7-7/8 and 6/28-6/30, and levofloxacin 7/10-7/12 for total 2 week ABX course.     H/o hepatitis C: Diagnosed in 1980s, 2 mos of treatment, quant negative since 10/2017, last positive 2/20/17 ([phone]).  Glenis positive on 6/2021 with negative HCV PCR.  H/o remote EtOH abuse.  MR elastography (4/27/21) with hepatology review and consult without any concerns post transplant.  Hepatitis C RNA negative and hepatitis C antibody positive (old) on 6/28.     PHS risk criteria donor:  Additional labs required post-transplant (between 4-8 weeks post-op): Hepatitis B, Hepatitis C, and HIV by SHERI ([id number], ordered 7/28).     Other relevant problems  managed by primary team:      SVT:   Aflutter with RVR: SVT first noted on 7/14, prior to HD line placement.  Continues intermittently.  Aflutter with RVR to 200 7/17 triggered by activity.  EP consulted 7/17 given persistent tachycardia/dysrhythmia.  - Metoprolol per primary team (started 7/15)  - Continue to wean midodrine as able  - Heparin gtt (7/17) and then transition to DOAC after PEG/J placement     Left hand ischemia: Radial artery thrombosis identified on duplex doppler.  S/p thrombectomy on 7/2.  Completed high intensity heparin course.  Continue daily aspirin.      BACILIO:   Hyperkalemia: Likely multifactorial including medications (Bactrim, tacrolimus) and hypotension.  Fludrocortisone 7/6-7/11.  Potassium now stable.  S/p non-tunneled HD line 7/14.  Initial iHD run 7/14 limited by afib with RVR vs SVT.  - Tacrolimus monitoring as above  - Volume management per nephrology and ICU team     Abdominal pain: Noted 7/15, cramping pain.  ACR with large stool burden in colon, no obstruction or distention.  Some nausea but no emesis.  CT abdomen (7/15) with moderate to large gastric distention, otherwise without obstruction.  NG placed for LIS with moderate to large output for several days.  Increased abdominal pain 7/17 after clamping for 4 hours, tolerated clamping today without issue.  Gastric emptying study (7/20) with severe gastric emptying delay (95% retention at 4 hours).   - Schedule simethicone   - Continue with TF via NJ and NG to LIS, IR to place PEG/J tube (delayed by supply chain issue, timeline TBD)  - Additional management per primary     C diff infection: Abdominal pain with diarrhea noted 7/8, AXR without dilated bowel, moderate colonic stool burden.  C diff positive 7/11.  Loose stools (on tube feeds) stable.  - PO vancomycin (7/11) per ICU team     Hypomagnesemia: Suppressed absorption d/t CNI.   - Mag oxide 400 mg daily  - Continue daily magnesium with additional replacement protocol  "prn     We appreciate the excellent care provided by the [place] [id number] team.  Recommendations communicated via telephone and this note.  Will continue to follow along closely, please do not hesitate to call with any questions or concerns.     Patient discussed with Dr. Castillo.    Catherine Johnson, DNP, APRN, CNP  Inpatient Nurse Practitioner  Pulmonary CF/Transplant     Subjective & Interval History:     On RA during the day and BiPAP overnight, VBG this morning relatively stable.  Improving cough strength, occasionally productive.  Gastric emptying study yesterday with severe gastroparesis, IR placement delayed d/t supply chain issue with PEG/J tube.  Right chest tube remains with moderate output.    Review of Systems:     C: No fever, no chills, no change in weight  INTEGUMENTARY/SKIN: No rash or obvious new lesions  ENT/MOUTH: No sore throat, no sinus pain, no nasal congestion or drainage  RESP: See interval history  CV: No chest pain, no palpitations, no peripheral edema, no orthopnea  GI: No nausea, no vomiting, no change in stools, no reflux symptoms  : No dysuria  MUSCULOSKELETAL: No myalgias, no arthralgias  ENDOCRINE: Blood sugars with adequate control  NEURO: No headache, no numbness or tingling  PSYCHIATRIC: Mood stable    Physical Exam:     All notes, images, and labs from past 24 hours (at minimum) were reviewed.    Vital signs:  Temp: 98.4  F (36.9  C) Temp src: Oral BP: 130/67 Pulse: 110   Resp: 15 SpO2: 94 % O2 Device: None (Room air) Oxygen Delivery: 2 LPM Height: 157.5 cm (5' 2\") Weight: 71.9 kg (158 lb 8 oz)  I/O:     Intake/Output Summary (Last 24 hours) at 7/21/2022 0944  Last data filed at 7/21/2022 0833  Gross per 24 hour   Intake 659.68 ml   Output 210 ml   Net 449.68 ml     Constitutional: Sitting on BSC, in no apparent distress.   HEENT: Eyes with pink conjunctivae, anicteric.  Oral mucosa moist without lesions.  NJ tube to right nare, NG to left nare.  PULM: Diminished bases bilaterally. "  No crackles, no rhonchi, no wheezes.  Non-labored breathing on RA.  Chest tube without air leak.  CV: Normal S1 and S2.  RRR.  No murmur, gallop, or rub.  No peripheral edema.   ABD: NABS, soft, nontender, nondistended.    MSK: Moves all extremities.    NEURO: Alert, conversant.   SKIN: Warm, dry.  No rash on limited exam.  Clamshell incision not visualized.  PSYCH: Mood stable.     Lines, Drains, and Devices:  Peripheral IV 07/19/22 Anterior;Left Upper forearm (Active)   Site Assessment [facility] 07/21/22 0400   Line Status Infusing 07/21/22 0400   Phlebitis Scale 0-->no symptoms 07/21/22 0400   Infiltration Scale 0 07/21/22 0400   Infiltration Site Treatment Method  None 07/20/22 2000   Number of days: 2       Peripheral IV 07/20/22 Anterior;Right Lower forearm (Active)   Site Assessment [facility] 07/21/22 0400   Line Status Saline locked 07/21/22 0400   Dressing Intervention New dressing  07/20/22 1548   Phlebitis Scale 0-->no symptoms 07/21/22 0400   Infiltration Scale 0 07/21/22 0400   Number of days: 1       CVC Double Lumen Right Internal jugular Non - tunneled (Active)   Site Assessment [facility] 07/21/22 0400   Dressing Type Chlorhexidine disk;Transparent 07/21/22 0400   Dressing Status clean;dry;intact 07/21/22 0400   Dressing Change Due 07/24/22 07/21/22 0400   Tubing Change primary tubing 07/15/22 1654   Line Necessity yes, meets criteria 07/21/22 0400   Blue - Status saline locked 07/21/22 0400   Blue - Cap Change Due 07/22/22 07/21/22 0400   Red - Status saline locked 07/21/22 0400   Red - Cap Change Due 07/22/22 07/21/22 0400   Phlebitis Scale 0-->no symptoms 07/14/22 1200   Infiltration? no 07/20/22 0400   Infiltration Scale 0 07/20/22 0400   Infiltration Site Treatment Method  None 07/20/22 0400   Was a vesicant infusing? no 07/20/22 0400   CVC Comment HD line 07/21/22 0400   Number of days: 7     Data:     LABS    CMP:   Recent Labs   Lab 07/21/22  0818 07/21/22  0413 07/20/22  2345 07/20/22  2100 07/20/22  0752  07/20/22  0648 07/19/22  0810 07/19/22  0721 07/18/22  0537 07/18/22  0532 07/17/22  0929 07/17/22  0636 07/15/22  0809 07/15/22  0420   NA  --   --   --   --   --  135*  --  135*  --  131*  --  127*   < > 135*   POTASSIUM  --   --   --   --   --  3.6  --  3.5  --  4.3  --  3.5   < > 4.6   CHLORIDE  --   --   --   --   --  92*  --  94*  --  94*  --  90*   < > 93*   CO2  --   --   --   --   --  31*  --  34*  --  31*  --  27   < > 34*   ANIONGAP  --   --   --   --   --  12  --  7  --  6*  --  10   < > 8   * 224* 143* 148*   < > 101*   < > 104*   < > 202*   < > 148*   < > 245*   BUN  --   --   --   --   --  87.0*  --  80.7*  --  59.5*  --  41.1*   < > 123.0*   CR  --   --   --   --   --  1.80*  --  2.06*  --  2.04*  --  1.62*   < > 2.65*   GFRESTIMATED  --   --   --   --   --  32*  --  27*  --  27*  --  36*   < > 20*   ESTUARDO  --   --   --   --   --  8.5*  --  8.5*  --  8.6*  --  8.4*   < > 8.7*   MAG  --   --   --   --   --  2.9*  --  3.0*  --  2.8*  --  1.9   < > 2.7*   PHOS  --   --   --   --   --  7.2*  --  7.3*  --  5.7*  --  4.1   < > 5.2*   PROTTOTAL  --   --   --   --   --  4.9*  --   --   --  4.7*  --   --   --  4.9*   ALBUMIN  --   --   --   --   --   --   --   --   --  2.8*  --   --   --  2.9*   BILITOTAL  --   --   --   --   --   --   --   --   --  0.2  --   --   --  0.2   ALKPHOS  --   --   --   --   --   --   --   --   --  85  --   --   --  74   AST  --   --   --   --   --   --   --   --   --  19  --   --   --  31   ALT  --   --   --   --   --   --   --   --   --  21  --   --   --  32    < > = values in this interval not displayed.     CBC:   Recent Labs   Lab 07/20/22  0648 07/19/22  0721 07/18/22  1355 07/18/22  0532   WBC 8.5 10.9 13.7* 10.5   RBC 2.88* 2.63* 2.85* 2.79*   HGB 9.1* 8.3* 8.9* 8.7*   HCT 28.9* 25.9* 28.6* 27.4*    99 100 98   MCH 31.6 31.6 31.2 31.2   MCHC 31.5 32.0 31.1* 31.8   RDW 17.1* 16.6* 16.3* 16.1*    243 246 186       INR: No lab results found in last 7  days.    Glucose:   Recent Labs   Lab 07/21/22  0818 07/21/22  0413 07/20/22  2345 07/20/22  2100 07/20/22  1530 07/20/22  1346   * 224* 143* 148* 87 78       Blood Gas:   Recent Labs   Lab 07/20/22  0648 07/19/22  0721 07/18/22  0532   PHV 7.32 7.31* 7.32   PCO2V 75* 73* 62*   PO2V 35 44 33   HCO3V 38* 37* 32*   LINDY 9.0* 8.2* 5.2*   O2PER 30 20 30       Culture Data No results for input(s): CULT in the last 168 hours.    Virology Data:   Lab Results   Component Value Date    FLUAH1 Not Detected 06/29/2022    FLUAH3 Not Detected 06/29/2022    [id number] Not Detected 06/29/2022    IFLUB Not Detected 06/29/2022    RSVA Not Detected 06/29/2022    RSVB Not Detected 06/29/2022    PIV1 Not Detected 06/29/2022    PIV2 Not Detected 06/29/2022    PIV3 Not Detected 06/29/2022    HMPV Not Detected 06/29/2022       Historical CMV results (last 3 of prior testing):  No results found for: CMVQNT  No results found for: CMVLOG    Urine Studies    Recent Labs   Lab Test 07/08/22  0831 07/05/22  1004   URINEPH 5.5 5.5   NITRITE Negative Negative   LEUKEST Negative Negative   WBCU 1 5       Most Recent [place] Pulmonary Function Testing (FVC/FEV1 only)  FVC-Pre   Date Value Ref Range Status   04/29/2022 1.82 L    11/11/2021 2.17 L    06/14/2021 2.00 L      FVC-%Pred-Pre   Date Value Ref Range Status   04/29/2022 58 %    11/11/2021 70 %    06/14/2021 64 %      FEV1-Pre   Date Value Ref Range Status   04/29/2022 0.51 L    11/11/2021 0.53 L    06/14/2021 0.54 L      FEV1-%Pred-Pre   Date Value Ref Range Status   04/29/2022 20 %    11/11/2021 21 %    06/14/2021 21 %        IMAGING    Recent Results (from the past 48 hour(s))   NM Gastric Emptying    Narrative    EXAM:  NM GASTRIC EMPTYING, 7/20/2022 2:16 PM  HISTORY: Post lung transplant, concern for gastroparesis    TECHNIQUE: The patient ingested 2.3mCi mCi of Tc-99m sulfur colloid in  1 egg, 1 toast with jelly . Static images were acquired at  approximately 1 hour intervals out  through 4 hours using a dual head  gamma camera in an anterior and posterior position. The calculation of  emptying was based on dual head imaging with geometric mean  calculations.  A Linear square fit was calculated to the emptying  curve to determine the amount of residual activity at each time point.    FINDINGS:   Percent retention at 60 min = 100%.  Percent retention at 120 min = 98%.  Percent retention at 180 min = 95%.  Percent retention at 240 min =95%.    T1/2 is greater than 240 minutes.      Impression    IMPRESSION: Delayed gastric emptying.    =====================  Reference Range:  Gastric emptying  - 30 minutes: <70% of retention (> 30% emptying) suggests abnormally     fast emptying.  - 60 minutes: <90% retention (>10% emptying) is normal; less than 30%     retention (>70% emptying) suggests abnormally rapid emptying.  - 90 minutes: <65% retention (> 35% emptying) is normal.  - 120 minutes: <60% retention (> 40% emptying) is normal.  - 180 minutes: <30% retention (> 70% emptying) is normal.  - 240 minutes: <10% retention (> 90% emptying) is normal.    Gastric emptying T-1/2:  Solid: The normal range is  minutes  Liquid only: Normal range is 10-45 minutes.  Liquid only-children: At 60 minutes, normal range is 44-58 % .  Liquid only-infants: At 60 minutes, normal range is 32-64 %.      I have personally reviewed the examination and initial interpretation  and I agree with the findings.    MIGUELITO NORWOOD MD         SYSTEM ID:  [id number]   XR Chest 2 Views    Narrative    EXAM: XR CHEST 2 VIEWS  7/20/2022 8:01 PM     HISTORY:  pulled left pleural tube       COMPARISON:  7/19/2022    FINDINGS  Technique: Upright PA and lateral views of the chest.    Devices: Left chest tube removed. Right basilar chest tube remains.  Clamshell sternotomy wires. Right internal jugular catheter terminates  over the high SVC. Gastric tube and feeding tube pass below the left  hemidiaphragm and inferior to the field  of view.    Biapical pleural effusions. Persistent loculated basilar left pleural  collection, with associated atelectasis. Hazy masslike lesion in the  left upper lobe, better visualized on CT. Persistent right  hilar/basilar atelectasis. Cardiac silhouette is stable in size.        Impression    IMPRESSION:   1. Interval removal of left basilar chest tube. Persistent loculated  left basilar pleural collection/hydropneumothorax with associated  atelectasis.  2. Right basilar chest tube remains in place.  3. Stable biapical pleural effusions.    I have personally reviewed the examination and initial interpretation  and I agree with the findings.    GEORGE ROGERS MD         SYSTEM ID:  [id number]   XR Chest Port 1 View    Narrative    Portable chest    INDICATION: Right basilar chest tube post lung transplant    COMPARISON: 7/20/2022    FINDINGS: Heart is upper normal size. Left costophrenic angle blunting  unchanged. Right basilar thoracostomy tube again present. Clamshell  sternotomy from prior bilateral lung transplant. Feeding tube and  NG/OG tube again present. Right IJ catheter tip in the SVC.      Impression    IMPRESSION: Continued left pleural effusion. Prior bilateral lung  transplant.    ALVINA HARRY MD         SYSTEM ID:  [id number]

## 2022-07-21 NOTE — PROGRESS NOTES
Transplant Social Work Services Progress Note      Date of Initial Social Work Evaluation: 4/8/2021  Collaborated with: lung transplant team    Data: supportive visit with patient.  Was on leave last week, so have not seen her since ICU.  She agrees she is doing and feeling much better.  Feels she is making good progress.   Worried about feeding tube discussion.  Unsure she wants it placed in stomach.  Worried that means she will never be able to eat again.  Assurance offered, but will ask pulm. Team to address with her and answer any questions. Discussed coverage and availability of a local apartment.  One may be available for her family soon, if they would consider staying here more and being at the hospital more.  Apartment will be covered by her Medicaid.  Discussed likely need for in patient rehab.  She prefers [facility] TCU/ARU where she could be followed by lung transplant team.    Intervention: supportive counseling.  Provided info on [facility] apartments  Assessment: patient doing better, coping ok.  Pleased with progress  Education provided by SW: lodging options and coverage  Plan:    Discharge Plans in Progress: inpatient ARU vs. TCU    Barriers to d/c plan: medical stability, chest tube, feeding tube    Follow up Plan: will continue to follow for support, counseling, education and resources.

## 2022-07-22 ENCOUNTER — APPOINTMENT (OUTPATIENT)
Dept: PHYSICAL THERAPY | Facility: CLINIC | Age: 62
DRG: 007 | End: 2022-07-22
Attending: THORACIC SURGERY (CARDIOTHORACIC VASCULAR SURGERY)
Payer: MEDICARE

## 2022-07-22 ENCOUNTER — APPOINTMENT (OUTPATIENT)
Dept: GENERAL RADIOLOGY | Facility: CLINIC | Age: 62
DRG: 007 | End: 2022-07-22
Attending: STUDENT IN AN ORGANIZED HEALTH CARE EDUCATION/TRAINING PROGRAM
Payer: MEDICARE

## 2022-07-22 LAB
ANION GAP SERPL CALCULATED.3IONS-SCNC: 4 MMOL/L (ref 7–15)
BASE EXCESS BLDV CALC-SCNC: 14.1 MMOL/L (ref -7.7–1.9)
BASOPHILS # BLD AUTO: 0 10E3/UL (ref 0–0.2)
BASOPHILS NFR BLD AUTO: 0 %
BUN SERPL-MCNC: 92.6 MG/DL (ref 8–23)
CALCIUM SERPL-MCNC: 9 MG/DL (ref 8.8–10.2)
CHLORIDE SERPL-SCNC: 93 MMOL/L (ref 98–107)
CREAT SERPL-MCNC: 1.58 MG/DL (ref 0.51–0.95)
DEPRECATED HCO3 PLAS-SCNC: 41 MMOL/L (ref 22–29)
EOSINOPHIL # BLD AUTO: 0.2 10E3/UL (ref 0–0.7)
EOSINOPHIL NFR BLD AUTO: 2 %
ERYTHROCYTE [DISTWIDTH] IN BLOOD BY AUTOMATED COUNT: 17.5 % (ref 10–15)
GFR SERPL CREATININE-BSD FRML MDRD: 37 ML/MIN/1.73M2
GLUCOSE BLDC GLUCOMTR-MCNC: 125 MG/DL (ref 70–99)
GLUCOSE BLDC GLUCOMTR-MCNC: 144 MG/DL (ref 70–99)
GLUCOSE BLDC GLUCOMTR-MCNC: 151 MG/DL (ref 70–99)
GLUCOSE BLDC GLUCOMTR-MCNC: 171 MG/DL (ref 70–99)
GLUCOSE BLDC GLUCOMTR-MCNC: 193 MG/DL (ref 70–99)
GLUCOSE SERPL-MCNC: 164 MG/DL (ref 70–99)
HCO3 BLDV-SCNC: 42 MMOL/L (ref 21–28)
HCT VFR BLD AUTO: 25.1 % (ref 35–47)
HGB BLD-MCNC: 7.8 G/DL (ref 11.7–15.7)
IMM GRANULOCYTES # BLD: 0.1 10E3/UL
IMM GRANULOCYTES NFR BLD: 1 %
LACTATE SERPL-SCNC: 0.8 MMOL/L (ref 0.7–2)
LYMPHOCYTES # BLD AUTO: 0.3 10E3/UL (ref 0.8–5.3)
LYMPHOCYTES NFR BLD AUTO: 3 %
MAGNESIUM SERPL-MCNC: 2.8 MG/DL (ref 1.7–2.3)
MCH RBC QN AUTO: 32 PG (ref 26.5–33)
MCHC RBC AUTO-ENTMCNC: 31.1 G/DL (ref 31.5–36.5)
MCV RBC AUTO: 103 FL (ref 78–100)
MONOCYTES # BLD AUTO: 0.6 10E3/UL (ref 0–1.3)
MONOCYTES NFR BLD AUTO: 6 %
NEUTROPHILS # BLD AUTO: 7.6 10E3/UL (ref 1.6–8.3)
NEUTROPHILS NFR BLD AUTO: 88 %
NRBC # BLD AUTO: 0 10E3/UL
NRBC BLD AUTO-RTO: 0 /100
O2/TOTAL GAS SETTING VFR VENT: 2 %
PATH REPORT.COMMENTS IMP SPEC: NORMAL
PATH REPORT.FINAL DX SPEC: NORMAL
PATH REPORT.GROSS SPEC: NORMAL
PATH REPORT.MICROSCOPIC SPEC OTHER STN: NORMAL
PATH REPORT.RELEVANT HX SPEC: NORMAL
PCO2 BLDV: 74 MM HG (ref 40–50)
PH BLDV: 7.36 [PH] (ref 7.32–7.43)
PHOSPHATE SERPL-MCNC: 5.4 MG/DL (ref 2.5–4.5)
PLATELET # BLD AUTO: 250 10E3/UL (ref 150–450)
PO2 BLDV: 50 MM HG (ref 25–47)
POTASSIUM SERPL-SCNC: 3.6 MMOL/L (ref 3.4–5.3)
RBC # BLD AUTO: 2.44 10E6/UL (ref 3.8–5.2)
SODIUM SERPL-SCNC: 138 MMOL/L (ref 136–145)
TACROLIMUS BLD-MCNC: 8.2 UG/L (ref 5–15)
TME LAST DOSE: NORMAL H
TME LAST DOSE: NORMAL H
UFH PPP CHRO-ACNC: 0.32 IU/ML
WBC # BLD AUTO: 8.6 10E3/UL (ref 4–11)

## 2022-07-22 PROCEDURE — 250N000013 HC RX MED GY IP 250 OP 250 PS 637: Performed by: NURSE PRACTITIONER

## 2022-07-22 PROCEDURE — 80048 BASIC METABOLIC PNL TOTAL CA: CPT | Performed by: NURSE PRACTITIONER

## 2022-07-22 PROCEDURE — 250N000013 HC RX MED GY IP 250 OP 250 PS 637: Performed by: STUDENT IN AN ORGANIZED HEALTH CARE EDUCATION/TRAINING PROGRAM

## 2022-07-22 PROCEDURE — 71045 X-RAY EXAM CHEST 1 VIEW: CPT | Mod: 26 | Performed by: RADIOLOGY

## 2022-07-22 PROCEDURE — 88112 CYTOPATH CELL ENHANCE TECH: CPT | Mod: 26 | Performed by: PATHOLOGY

## 2022-07-22 PROCEDURE — 250N000013 HC RX MED GY IP 250 OP 250 PS 637: Performed by: SURGERY

## 2022-07-22 PROCEDURE — 88305 TISSUE EXAM BY PATHOLOGIST: CPT | Mod: 26 | Performed by: PATHOLOGY

## 2022-07-22 PROCEDURE — 82803 BLOOD GASES ANY COMBINATION: CPT | Performed by: NURSE PRACTITIONER

## 2022-07-22 PROCEDURE — 94660 CPAP INITIATION&MGMT: CPT

## 2022-07-22 PROCEDURE — 250N000013 HC RX MED GY IP 250 OP 250 PS 637

## 2022-07-22 PROCEDURE — 85520 HEPARIN ASSAY: CPT | Performed by: STUDENT IN AN ORGANIZED HEALTH CARE EDUCATION/TRAINING PROGRAM

## 2022-07-22 PROCEDURE — 94640 AIRWAY INHALATION TREATMENT: CPT

## 2022-07-22 PROCEDURE — 99233 SBSQ HOSP IP/OBS HIGH 50: CPT | Mod: GC | Performed by: STUDENT IN AN ORGANIZED HEALTH CARE EDUCATION/TRAINING PROGRAM

## 2022-07-22 PROCEDURE — 250N000012 HC RX MED GY IP 250 OP 636 PS 637: Performed by: PHYSICIAN ASSISTANT

## 2022-07-22 PROCEDURE — 250N000011 HC RX IP 250 OP 636: Performed by: STUDENT IN AN ORGANIZED HEALTH CARE EDUCATION/TRAINING PROGRAM

## 2022-07-22 PROCEDURE — 71045 X-RAY EXAM CHEST 1 VIEW: CPT

## 2022-07-22 PROCEDURE — 36415 COLL VENOUS BLD VENIPUNCTURE: CPT | Performed by: NURSE PRACTITIONER

## 2022-07-22 PROCEDURE — 250N000012 HC RX MED GY IP 250 OP 636 PS 637: Performed by: NURSE PRACTITIONER

## 2022-07-22 PROCEDURE — 250N000012 HC RX MED GY IP 250 OP 636 PS 637: Performed by: THORACIC SURGERY (CARDIOTHORACIC VASCULAR SURGERY)

## 2022-07-22 PROCEDURE — 85025 COMPLETE CBC W/AUTO DIFF WBC: CPT | Performed by: NURSE PRACTITIONER

## 2022-07-22 PROCEDURE — 83735 ASSAY OF MAGNESIUM: CPT | Performed by: SURGERY

## 2022-07-22 PROCEDURE — 84100 ASSAY OF PHOSPHORUS: CPT | Performed by: SURGERY

## 2022-07-22 PROCEDURE — 94668 MNPJ CHEST WALL SBSQ: CPT

## 2022-07-22 PROCEDURE — 36415 COLL VENOUS BLD VENIPUNCTURE: CPT | Performed by: STUDENT IN AN ORGANIZED HEALTH CARE EDUCATION/TRAINING PROGRAM

## 2022-07-22 PROCEDURE — 999N000157 HC STATISTIC RCP TIME EA 10 MIN

## 2022-07-22 PROCEDURE — 120N000002 HC R&B MED SURG/OB UMMC

## 2022-07-22 PROCEDURE — 94640 AIRWAY INHALATION TREATMENT: CPT | Mod: 76

## 2022-07-22 PROCEDURE — 250N000011 HC RX IP 250 OP 636: Performed by: SURGERY

## 2022-07-22 PROCEDURE — 250N000011 HC RX IP 250 OP 636

## 2022-07-22 PROCEDURE — 97530 THERAPEUTIC ACTIVITIES: CPT | Mod: GP | Performed by: PHYSICAL THERAPIST

## 2022-07-22 PROCEDURE — 250N000013 HC RX MED GY IP 250 OP 250 PS 637: Performed by: THORACIC SURGERY (CARDIOTHORACIC VASCULAR SURGERY)

## 2022-07-22 PROCEDURE — 99233 SBSQ HOSP IP/OBS HIGH 50: CPT | Mod: 24 | Performed by: PHYSICIAN ASSISTANT

## 2022-07-22 PROCEDURE — 83605 ASSAY OF LACTIC ACID: CPT | Performed by: STUDENT IN AN ORGANIZED HEALTH CARE EDUCATION/TRAINING PROGRAM

## 2022-07-22 PROCEDURE — 80197 ASSAY OF TACROLIMUS: CPT | Performed by: PHYSICIAN ASSISTANT

## 2022-07-22 PROCEDURE — 250N000009 HC RX 250: Performed by: SURGERY

## 2022-07-22 RX ADMIN — SIMETHICONE 80 MG: 80 TABLET, CHEWABLE ORAL at 13:04

## 2022-07-22 RX ADMIN — MYCOPHENOLATE MOFETIL 1000 MG: 200 POWDER, FOR SUSPENSION ORAL at 20:49

## 2022-07-22 RX ADMIN — TACROLIMUS 5 MG: 5 CAPSULE ORAL at 17:56

## 2022-07-22 RX ADMIN — Medication 5 MG: at 10:22

## 2022-07-22 RX ADMIN — NYSTATIN: 100000 CREAM TOPICAL at 07:45

## 2022-07-22 RX ADMIN — HEPARIN SODIUM 900 UNITS/HR: 10000 INJECTION, SOLUTION INTRAVENOUS at 01:38

## 2022-07-22 RX ADMIN — Medication 5 MG: at 01:49

## 2022-07-22 RX ADMIN — METOPROLOL TARTRATE 25 MG: 25 TABLET, FILM COATED ORAL at 20:49

## 2022-07-22 RX ADMIN — INSULIN GLARGINE 15 UNITS: 100 INJECTION, SOLUTION SUBCUTANEOUS at 20:53

## 2022-07-22 RX ADMIN — INSULIN ASPART 1 UNITS: 100 INJECTION, SOLUTION INTRAVENOUS; SUBCUTANEOUS at 15:31

## 2022-07-22 RX ADMIN — SIMETHICONE 80 MG: 80 TABLET, CHEWABLE ORAL at 18:26

## 2022-07-22 RX ADMIN — PREDNISONE 15 MG: 5 TABLET ORAL at 07:44

## 2022-07-22 RX ADMIN — INSULIN ASPART 1 UNITS: 100 INJECTION, SOLUTION INTRAVENOUS; SUBCUTANEOUS at 08:00

## 2022-07-22 RX ADMIN — INSULIN GLARGINE 15 UNITS: 100 INJECTION, SOLUTION SUBCUTANEOUS at 07:59

## 2022-07-22 RX ADMIN — NYSTATIN: 100000 CREAM TOPICAL at 22:44

## 2022-07-22 RX ADMIN — INSULIN ASPART 3 UNITS: 100 INJECTION, SOLUTION INTRAVENOUS; SUBCUTANEOUS at 04:45

## 2022-07-22 RX ADMIN — VANCOMYCIN HYDROCHLORIDE 125 MG: KIT at 01:38

## 2022-07-22 RX ADMIN — VANCOMYCIN HYDROCHLORIDE 125 MG: KIT at 07:51

## 2022-07-22 RX ADMIN — TACROLIMUS 5 MG: 5 CAPSULE ORAL at 10:29

## 2022-07-22 RX ADMIN — DAPSONE 50 MG: 25 TABLET ORAL at 10:46

## 2022-07-22 RX ADMIN — NYSTATIN 1000000 UNITS: 100000 SUSPENSION ORAL at 07:45

## 2022-07-22 RX ADMIN — Medication 1 LOZENGE: at 06:54

## 2022-07-22 RX ADMIN — ACETYLCYSTEINE 2 ML: 200 SOLUTION ORAL; RESPIRATORY (INHALATION) at 13:41

## 2022-07-22 RX ADMIN — MYCOPHENOLATE MOFETIL 1000 MG: 200 POWDER, FOR SUSPENSION ORAL at 07:41

## 2022-07-22 RX ADMIN — NYSTATIN 1000000 UNITS: 100000 SUSPENSION ORAL at 22:36

## 2022-07-22 RX ADMIN — LEVALBUTEROL HYDROCHLORIDE 1.25 MG: 1.25 SOLUTION RESPIRATORY (INHALATION) at 20:55

## 2022-07-22 RX ADMIN — ACETAMINOPHEN 650 MG: 325 TABLET, FILM COATED ORAL at 09:35

## 2022-07-22 RX ADMIN — VANCOMYCIN HYDROCHLORIDE 125 MG: KIT at 13:05

## 2022-07-22 RX ADMIN — ACETYLCYSTEINE 2 ML: 200 SOLUTION ORAL; RESPIRATORY (INHALATION) at 09:03

## 2022-07-22 RX ADMIN — ONDANSETRON 4 MG: 4 TABLET, ORALLY DISINTEGRATING ORAL at 07:44

## 2022-07-22 RX ADMIN — VALGANCICLOVIR HYDROCHLORIDE 450 MG: 50 POWDER, FOR SOLUTION ORAL at 07:40

## 2022-07-22 RX ADMIN — ONDANSETRON 4 MG: 4 TABLET, ORALLY DISINTEGRATING ORAL at 18:43

## 2022-07-22 RX ADMIN — Medication 1 CAPSULE: at 20:48

## 2022-07-22 RX ADMIN — METOPROLOL TARTRATE 25 MG: 25 TABLET, FILM COATED ORAL at 07:44

## 2022-07-22 RX ADMIN — THERA TABS 1 TABLET: TAB at 12:09

## 2022-07-22 RX ADMIN — SIMETHICONE 80 MG: 80 TABLET, CHEWABLE ORAL at 22:36

## 2022-07-22 RX ADMIN — PREDNISONE 12.5 MG: 2.5 TABLET ORAL at 20:49

## 2022-07-22 RX ADMIN — SIMETHICONE 80 MG: 80 TABLET, CHEWABLE ORAL at 07:44

## 2022-07-22 RX ADMIN — Medication 40 MG: at 20:49

## 2022-07-22 RX ADMIN — LEVALBUTEROL HYDROCHLORIDE 1.25 MG: 1.25 SOLUTION RESPIRATORY (INHALATION) at 09:03

## 2022-07-22 RX ADMIN — VANCOMYCIN HYDROCHLORIDE 125 MG: KIT at 20:49

## 2022-07-22 RX ADMIN — INSULIN ASPART 2 UNITS: 100 INJECTION, SOLUTION INTRAVENOUS; SUBCUTANEOUS at 20:53

## 2022-07-22 RX ADMIN — LEVALBUTEROL HYDROCHLORIDE 1.25 MG: 1.25 SOLUTION RESPIRATORY (INHALATION) at 13:41

## 2022-07-22 RX ADMIN — Medication 5 MG: at 06:11

## 2022-07-22 RX ADMIN — Medication 1 PACKET: at 12:10

## 2022-07-22 RX ADMIN — Medication 40 MG: at 10:29

## 2022-07-22 RX ADMIN — NYSTATIN 1000000 UNITS: 100000 SUSPENSION ORAL at 13:04

## 2022-07-22 RX ADMIN — CALCIUM CARBONATE 600 MG (1,500 MG)-VITAMIN D3 400 UNIT TABLET 1 TABLET: at 18:26

## 2022-07-22 RX ADMIN — CALCIUM CARBONATE 600 MG (1,500 MG)-VITAMIN D3 400 UNIT TABLET 1 TABLET: at 10:23

## 2022-07-22 RX ADMIN — ASPIRIN 81 MG CHEWABLE TABLET 81 MG: 81 TABLET CHEWABLE at 10:23

## 2022-07-22 RX ADMIN — ACETAMINOPHEN 975 MG: 325 TABLET, FILM COATED ORAL at 20:48

## 2022-07-22 RX ADMIN — ACETAMINOPHEN 975 MG: 325 TABLET, FILM COATED ORAL at 13:05

## 2022-07-22 RX ADMIN — NYSTATIN 1000000 UNITS: 100000 SUSPENSION ORAL at 18:26

## 2022-07-22 RX ADMIN — Medication 5 MG: at 18:25

## 2022-07-22 RX ADMIN — Medication 1 CAPSULE: at 10:23

## 2022-07-22 RX ADMIN — ACETYLCYSTEINE 2 ML: 200 SOLUTION ORAL; RESPIRATORY (INHALATION) at 20:55

## 2022-07-22 RX ADMIN — THIAMINE HCL TAB 100 MG 100 MG: 100 TAB at 10:23

## 2022-07-22 RX ADMIN — ACETAMINOPHEN 975 MG: 325 TABLET, FILM COATED ORAL at 07:43

## 2022-07-22 ASSESSMENT — ACTIVITIES OF DAILY LIVING (ADL)
ADLS_ACUITY_SCORE: 30

## 2022-07-22 NOTE — PROGRESS NOTES
No acute events this shift.    N: WDL  CV: NSR to ST  R: Oxymask at times for comfort  GI: TF  : Oliguric, commode with 1x assist  Access: 2 PIVs patent, SL  Skin: No new deficits noted

## 2022-07-22 NOTE — PROGRESS NOTES
No acute events this shift.     N: WDL  CV: ST at times  R: Oxymask at times for comfort  GI: TF, NG to LIS  : Oliguric, commode with 1x assist  Access: L PIV removed d/t patency. R PIV, infusing.  Skin: No new deficits noted

## 2022-07-22 NOTE — PROGRESS NOTES
Cardiovascular Surgery Progress Note  07/22/2022         Assessment and Plan:     Sofie is a 62 F PMH of oxygen-dependent COPD, HFpEF, HTN, HCV, and osteopenia who underwent bilateral lung transplant on 6/28/22 with Dr. Sunshine. This was complicated by left upper extremity acute limb ischemia s/p left radial thrombectomy on 7/1/22. Recovering renal function, will hold off on tunneled dialysis line at this time.       - Right Pleural tube remains to suction, elevated output but improving, can ambulate off suction. Keep tube today 7/22.   - Removed left pleural tube 7/20, she may have significant drainage from the tube sites. Has small left lower pleural effusion, continue to monitor.  - Has bilateral apical pleural effusions, lungs did not fill chest space per surgeon.     Discussed with Dr Sunshine through both written and verbal communication.      Andres Wilson PA-C  Cardiothoracic Surgery  Pager [phone]    9:15 AM   July 20, 2022

## 2022-07-22 NOTE — PROGRESS NOTES
[facility]    Progress Note - Hospitalist Service, GOLD TEAM 10       Date of Admission:  6/28/2022    Assessment & Plan            Sofie Rodriguez is a 62 year old female admitted on 6/28/2022. She has PMH of end stage COPD s/p b/l lung transplant on 6/28/2022, HTN, HFpEF, h/o hepC, oteopenia, and former methamphetamine use, and she was transferred to [place] [address] [facility] from MICU on 7/15/2022. She was admitted to the MICU on 7/13/20222 with prior hospital course complicated by left upper extremity acute limb ischemia s/p left radial thrombectomy on 7/1/2022. She was primarily treated for acute hypercapnic respiratory failure on intermittent BIPAP with worsening BACILIO while in the MICU. Breathing is improving, but patient has severely delayed gastric emptying found on NM study. PEGJ placement ordered, pending supply chain issue resolution.       Summary of today's plan:   - Goal to get PEGJ but limitation to PEGJ supplies may cause delay  - HD cath removed today 7/22  - NPO, NG to LIMS minimal output, NJ for TFs  - DOAC once PEGJ placed  - Continue to encourage OOB and up in chair  - BIPAP at night     End stage COPD s/p BOLT 6/28/2022  Acute hypercapnic hypoxic respiratory failure (improving), likely 2/2 decreased central respiratory drive vs respiratory muscle weakness and some pulmonary edema / fluid Transplanted 6/28. formal SNIFF test was performed on 7/14 showed R hemidiaphragm palsy. Of note transplanted lungs were also considered small for body size and could contribute to decreased respiratory compensation for hypercarbia. VBG relatively stable, most recent pCO2 74 (7/22).   - Continue BIPAP at night   - NC with intermittent BIPAP for night and daytime naps; goal to keep O2sat above 92%  - f/u myasthenia gravis panel  (pending from 7/14)  - oxycodone 2.5-5mg q4h PRN   - encourage activity and getting up in bed; PT/OT participation  - encourage chest physiotherapy  QID     Post-transplant  Immunosuppression:  - Prednisone 15mg BID  - Tacrolimus 5mg BID (goal 8-12)   - MMF 1000mg BID  Prophylasxis:  - CMV - Valgancyclover  - Thrush - PO nysatin  - PJP - TMP-SMX (currently held given BACILIO); dapsone started 7/18 at 50mg qMWF (will try to increase to daily on 7/25 per pulm)     Chest tubes  - 1 R chest tube (basilar) in place currently (pericardial drain was removed 7/15, L basilar was removed 7/20). CT chest 7/14 showing unchanged bilateral effusions and unchanged biapical pneumothoraces with resolved left basilar pneumothorax; bibasilar chest tubes in place with pericardial drain (which was removed 7/15). R tube still with ~200mL output over past day (7/22).   - daily CXR  - CV surgery and transplant pulm following     Hypotension, resolved  H/o HTN  H/o HFpEF  Likely vasoplegia post operatively. Last echo 7/7 normal EF with good LV function. S/p hydrocortisone 7/6-7/9 and fludrocortisone 7/6-7/11 without significant improvement.  - off midorine 7/19  - Holding PTA lasix 20mg daily     C.diff  Abdominal pain   Worsening diarrhea via rectal pouch with new onset abd pain that is significantly worse as of 7/15. C. Diff positive on 7/11 with vanc started on 7/12. CT abd 7/15 showed no signs of toxic megacolon, but showed very distended stomach; NG was placed 7/15 for decompression. Patient is symptomatically improved while on intermittent suction.  - PO vanc 125mg QID (7/12-7/26)  - holding bowel regimen  - gastric emptying study 7/20 showed delayed gastric emptying with retention of 95% of stomach contents after 4 hours; please keep patient NPO except tube feeds and meds  - PEGJ ordered, placement is pending supply  - Simethicone scheduled     NJ tube  Feeding regimen:   - Adult Formula Drip Feeding: Continuous Novasource Renal; Nasojejunal; Goal Rate: 35; initiate at goal rate; mL/hr; Medication - Feeding Tube Flush Frequency: At least 15-30 mL water before and after medication  administration and with tube clogging     BACILIO  Hypermagnesemia  Hyperphosphatemia  Baseline renal function was normal prior to surgery. New onset renal failure after transplant, likely multifactorial due to pre-renal hypotension, less likely nephrotoxic agents. Overall kidney function improving. Today, Cr fluctuating but BUN increasing, with unclear urine output (7/22).   - HD cath removed today 7/22  - R internal jugular non-tunneled cathether placed with HD line on 7/14, s/p 3 dialysis sessions with last one on 7/17; catheter removed 7/22  - question of accuracy of urine output given often stooling and urinating simultaneously in commode     Tachyarrthymia  Paroxysmal afib  Paroxysmal aflutter/AT  SVT vs afib.Had an occurrence during HD on 7/14. Had afib with RVR to 200s on 7/17. EP consult placed.asmyptomatic and stable during episodes. Aflutter episode (7/17) with transfer. Vagal maneuver responsive at time. No recent tachyarrhythmia episodes (7/22).   - Per EP: patient has had episodes of possible flutter (vs AT with 2:1 conduction) and afib with RVR  - heparin drip and transition to DOAC after PEGJ placement (CHADS-VASc score of 2)  - On telemetry  - On metoprolol tartrate 25mg BID  - Discharge on ziopatch for 14 days with EP follow up in 1-2 months after     Stress-induced hyperglycemia  -200s over past day.   - on 15U glargine BID; continue today and re-evaluate as needed     Acute Blood Loss Anemia, stable  Initially from acute blood loss. Hb dropped to 6.8 on 7/14, requiring 1U pRBC. Post-transfusion Hb 9.4 > 8.3 > 8.6 > 8.7 (7/18).   - continue to monitor  - transfuse for hgb<7           Diet: Adult Formula Drip Feeding: Continuous Novasource Renal; Nasojejunal; Goal Rate: 35; initiate at 15 ml/hr and advance by 10 mL Q8H to goal; mL/hr; Medication - Feeding Tube Flush Frequency: At least 15-30 mL water before and after medication admin...  NPO per Anesthesia Guidelines for Procedure/Surgery Except  for: Meds, Ice Chips, NPO but receiving Tube Feeding    DVT Prophylaxis: Heparin SQ  Dong Catheter: Not present  Fluids: none  Central Lines: PRESENT  CVC Double Lumen Right Internal jugular Non - tunneled-Site Assessment: WDL  Cardiac Monitoring: None  Code Status: Full Code      Disposition Plan   Pending further evaluation      The patient's care was discussed with the Attending Physician, Dr. Preciado.    Fan Lee MD  Hospitalist Service, [facility] 10  [facility]  Securely message with the Vocera Web Console (learn more here)  Text page via [facility] Paging/Directory   Please see signed in provider for up to date coverage information      Clinically Significant Risk Factors Present on Admission                      ______________________________________________________________________    Interval History   Sofie is feeling overall well today. Abd pain is minimal, and breathing is without excessive difficulty. Patient's daughter was present. Still making urine and passing loose BMs, though unclear how much urine. No fevers/chills, vomiting, constipation.     Data reviewed today: I reviewed all medications, new labs and imaging results over the last 24 hours. I personally reviewed no images or EKG's today.    Physical Exam   Vital Signs: Temp: 97.6  F (36.4  C) Temp src: Axillary BP: (!) 154/73 Pulse: 100   Resp: 19 SpO2: 100 % O2 Device: None (Room air) Oxygen Delivery: 2 LPM  Weight: 158 lbs 8 oz  Physical Exam  Constitutional:       Appearance: Normal appearance.   HENT:      Nose:      Comments: NG and NJ tubes in place.   Cardiovascular:      Rate and Rhythm: Normal rate and regular rhythm.      Pulses: Normal pulses.      Heart sounds: Normal heart sounds. No murmur heard.  Pulmonary:      Effort: Pulmonary effort is normal. No respiratory distress.      Comments: Clear to auscultation of anterior upper and lateral lung fields.   Abdominal:      General: Abdomen  is flat. Bowel sounds are normal.      Palpations: Abdomen is soft.      Tenderness: There is no abdominal tenderness. There is no rebound.   Musculoskeletal:      Comments: 1-2+ pitting edema of bilateral lower extremities.    Skin:     General: Skin is warm and dry.      Capillary Refill: Capillary refill takes less than 2 seconds.   Neurological:      General: No focal deficit present.      Mental Status: She is alert.   Psychiatric:         Mood and Affect: Mood normal.           Data   Recent Labs   Lab 07/22/22  1217 07/22/22  0757 07/22/22  0729 07/21/22  1211 07/21/22  0955 07/21/22  0413 07/21/22  0122 07/20/22  0752 07/20/22  0648 07/18/22  0537 07/18/22  0532   WBC  --   --  8.6  --  9.2  --   --   --  8.5   < > 10.5   HGB  --   --  7.8*  --  8.4*  --   --   --  9.1*   < > 8.7*   MCV  --   --  103*  --  102*  --   --   --  100   < > 98   PLT  --   --  250  --  305  --   --   --  264   < > 186   NA  --   --  138  --  139  --  143  --  135*   < > 131*   POTASSIUM  --   --  3.6  --  3.7  --  4.2  --  3.6   < > 4.3   CHLORIDE  --   --  93*  --  95*  --  96*  --  92*   < > 94*   CO2  --   --  41*  --  32*  --  25  --  31*   < > 31*   BUN  --   --  92.6*  --  89.3*  --  86.6*  --  87.0*   < > 59.5*   CR  --   --  1.58*  --  1.46*  --  1.62*  --  1.80*   < > 2.04*   ANIONGAP  --   --  4*  --  12  --  22*  --  12   < > 6*   ESTUARDO  --   --  9.0  --  8.6*  --  7.9*  --  8.5*   < > 8.6*   * 151* 164*   < > 164*   < > 151*   < > 101*   < > 202*   ALBUMIN  --   --   --   --  2.8*  --   --   --   --   --  2.8*   PROTTOTAL  --   --   --   --  4.9*  --   --   --  4.9*  --  4.7*   BILITOTAL  --   --   --   --  0.2  --   --   --   --   --  0.2   ALKPHOS  --   --   --   --  74  --   --   --   --   --  85   ALT  --   --   --   --  18  --   --   --   --   --  21   AST  --   --   --   --  17  --   --   --   --   --  19    < > = values in this interval not displayed.     Recent Results (from the past 24 hour(s))   XR Chest  Port 1 View    Narrative    EXAM: XR CHEST PORT 1 VIEW  7/22/2022 10:46 AM    HISTORY: 62 years Female Right basilar chest tube s/p lung transplant.      COMPARISON: Chest radiograph 7/21/2022.    TECHNIQUE: Portable AP view of the chest.     FINDINGS:   Post surgical changes of bilateral lung transplant. Clamshell  sternotomy wires appear intact. Surgical clips overlie the  mediastinum. Right basilar chest tube in place. Interval removal of  right internal jugular central venous catheter. Larger diameter  enteric tube courses below the level of the diaphragm and out of the  field-of-view. Smaller diameter enteric tube also courses out of the  field of view; however, sidehole is now visible immediately below the  level the diaphragm.    Midline trachea. Stable cardiomediastinal silhouette with some  blurring of the left heart border and loss of the left  hemidiaphragmatic silhouette. Slightly increased left-sided pleural  effusion with fluid present in the left major fissure. No discernible  pneumothorax. Similar appearance of hazy perihilar and left basilar  opacities.      Impression    IMPRESSION:   1.  Slight increase in size of the left pleural effusion with fluid  tracking along the left major fissure.   2.  Hazy perihilar opacities, likely atelectasis versus mild edema.  3.  Enteric tube sidehole projects immediately inferior to the  diaphragm. Consider advancement. Remainder of support devices appear  stable.    I have personally reviewed the examination and initial interpretation  and I agree with the findings.    ALVINA HARRY MD         SYSTEM ID:  [id number]

## 2022-07-22 NOTE — PLAN OF CARE
End of shift summary([id number])     Neuro/Musculoskeletal:  A&Ox4.  Cardiac:  SR/ST.  VSS.            Respiratory:  Sating in 90's on bipap overnight but transitions to oxymask 1-3L during the day  GI/: 3X liquid stool  Diet/Appetite:  tube feed, but ok to drip water .  Activity: SBA   Pain:  oxycodone 5mg Q 4 PRN  Skin:  No new deficits noted.  LDAs + Drips/IVF:  heparin gtt @ 900  Protocols/Labs: scheduled Xa & routine morning labs     Pertinent Shift Updates: none     Plan:Take out HD line if morning labs are acceptable & future consult for PEG placement.

## 2022-07-22 NOTE — PROGRESS NOTES
Pulmonary Medicine  Cystic Fibrosis - Lung Transplant Team  Progress Note  2022       Patient: Sofie Rodriguez  MRN: [id number]  : 1960 (age 62 year old)  Transplant: 2022 (Lung), POD#24  Admission date: 2022    Assessment & Plan:     Sofie Rodriguez is a 62 year old female with a PMH significant for end-stage COPD, HTN, HFpEF, Mycobacterium peregrinum colonization, h/o hepatitis C, HECTOR s/p LEEP procedure, osteopenia, and former methamphetamine use.  Pt. is now s/p BSLT on 22, lungs slightly undersized.   Persistent low dose pressor needs post-op through 7/10.  Extubated POD #2 but with persistent hypercapnia, mostly compensated and only slightly improved with intermittent NIPPV.  Also with left radial artery thrombus (presumed secondary to arterial line) s/p thrombectomy 7, BACILIO, and C diff.  Rehabilitation and airway clearance limited by dysrhythmia and gastric discomfort.     Today's recommendations:  - DSA, EBV, IgG, and donor risk labs ordered   - CPT QID, encourage consistent participation d/t concern for ineffective airway clearance  - Following pleural cultures, right chest tube to remain given output  - Encourage increased activity including up in chair (minimal day time in bed)  - Tacrolimus level therapeutic, no dose adjustment, repeat level ordered   - Prednisone taper due  (not yet ordered)  - Increase dapsone to daily after one week () if hgb remains stable  - Follow pending cultures  - Transition to DOAC after PEG/J placement  - Continue with NG to LIS, IR to place PEG/J tube (delayed by supply chain issue, timeline TBD)  - PO vancomycin per primary team    S/p bilateral sequential lung transplant (BSLT) for end stage COPD:  Persistent hypercapneic respiratory failure:   Persistent hypotension:   Bilateral hydroPTX:  Right hemidiaphragm palsy: Explant pathology with severe emphysema with subpleural bullae formation, changes of chronic bronchitis,  subpleural scars and patchy pulmonary edema; benign hilar lymph nodes.  No evidence of PGD post-op.  Extubated 6/30.  Persistent hypercapnia without dyspnea, appears to be a respiratory drive problem.  Some improvement with BiPAP, limited tolerance in part due to anxiety.  Persistent low dose pressors weaned off 7/10, on scheduled midodrine (also s/p hydrocortisone 7/6-7/9 and fludrocortisone 7/6-7/11).  NIFF (7/14) notable for right hemidiaphragm palsy.  Chest CT (7/18) with bilateral biapical effusions R>L and improved LLL atelectasis, also with LLL hydroPTX and bilateral PTX; bilateral chest tubes not communicating well with loculated effusion areas (personally reviewed).  Bronch (7/19), no bronch note recorded yet.  On 1L NC to RA during the day with BiPAP overnight (improving/stable hypercapnea).  - DSA one month post-transplant (7/28, ordered)  - Ammonia monitoring twice weekly (screening for hyperammonemia post-lung transplant)  - Nebs: levalbuterol and Mucomyst QID   - Pulmonary toilet with chest physiotherapy QID, encourage consistent participation d/t concern for ineffective airway clearance (inconsistent/infrequent use post-op)  - Aerobika and incentive spirometry hourly while awake  - Supplemental oxygen as needed to maintain SpO2 >92% (wean as able), NIPPV overnight given persistent hypercapnia  - Chest tubes managed by surgical team, right tube remains with moderate output  - Volume management per primary team  - Encourage increased activity including up in chair (minimal day time in bed) and active participation in PT/OT given concern for deconditioning and ineffective airway clearance     Immunosuppression:  Induction therapy with basiliximab (and high dose IV steroid) given intraoperatively, repeating basiliximab dose on POD#4.  - Tacrolimus 5 mg BID (via NJ tube, held 7/11-7/12 for supratherapeutic level, peak level appropriate 7/11).  Goal level 8-12.  Tacrolimus level 7/22 therapeutic at 8.2  (14h), no dose adjustment, repeat level 7/25 (orderd).  - MMF 1000 mg BID (decreased 7/10 due to diarrhea)  - Prednisone 15 mg qAM / 12.5 mg qPM, next taper due 7/28 (not yet ordered)  Date AM dose (mg) PM dose (mg)   7/21/22 15 12.5   7/28/22 12.5 12.5   8/4/22 12.5 10   8/18/22 10 10   9/15/22 10 7.5   10/13/22 7.5 7.5   11/10/22 7.5 5   12/8/22 5 5   1/5/23 5 2.5      Prophylaxis:   - Dapsone for PJP ppx (7/18), increase to daily after one week (7/25) if hgb remains stable able (Bactrim held 7/7 for BACILIO)  - VGCV for CMV ppx  - Nystatin for oral candidiasis ppx, 6 month course  - See below for serologies and viral ppx:    Donor Recipient Intervention   CMV status Positive Positive Valganciclovir POD #8-90   EBV status Positive Positive EBV check monthly (7/28, ordered)   HSV status N/A Positive Not indicated      ID:  H/o M. peregrinum colonization: Donor bronch cultures (OSH) with Strep beta hemolytic (not group A).  Recipient cultures as below.  Bronch cultures (7/12) NGTD.  - IgG at one month (7/28, ordered)  - AFB bronch culture (6/28, 6/29, 7/12) NGTD, AFB to be sent on all future bronchs  - Right pleural cultures (7/20) NGTD, follow     Streptococcus pneumoniae:  Stenotrophomonas maltophilia: Noted in recipient cultures at time of transplant.  S/p ceftazidime 6/28-7/10, vancomycin 7/7-7/8 and 6/28-6/30, and levofloxacin 7/10-7/12 for total 2 week ABX course.     H/o hepatitis C: Diagnosed in 1980s, 2 mos of treatment, quant negative since 10/2017, last positive 2/20/17 ([phone]).  Glenis positive on 6/2021 with negative HCV PCR.  H/o remote EtOH abuse.  MR elastography (4/27/21) with hepatology review and consult without any concerns post transplant.  Hepatitis C RNA negative and hepatitis C antibody positive (old) on 6/28.     PHS risk criteria donor:  Additional labs required post-transplant (between 4-8 weeks post-op): Hepatitis B, Hepatitis C, and HIV by SHERI ([id number], ordered 7/28).     Other relevant  problems managed by primary team:      SVT:   Aflutter with RVR: SVT first noted on 7/14, prior to HD line placement.  Continues intermittently.  Aflutter with RVR to 200 7/17 triggered by activity.  EP consulted 7/17 given persistent tachycardia/dysrhythmia.  Midodrine held 7/19.  - Metoprolol per primary team (started 7/15)  - Heparin gtt (7/17) and then transition to DOAC after PEG/J placement     Left hand ischemia: Radial artery thrombosis identified on duplex doppler.  S/p thrombectomy on 7/2.  Completed high intensity heparin course.  Continue daily aspirin.      BACILIO:   Hyperkalemia: Likely multifactorial including medications (Bactrim, tacrolimus) and hypotension.  Fludrocortisone 7/6-7/11.  Potassium now stable.  S/p non-tunneled HD line 7/14.  Initial iHD run 7/14 limited by afib with RVR vs SVT.  Last iHD run 7/16, line removed 7/22.  - Tacrolimus monitoring as above  - Management per nephrology and primary team     Abdominal pain: Noted 7/15, cramping pain.  ACR with large stool burden in colon, no obstruction or distention.  Some nausea but no emesis.  CT abdomen (7/15) with moderate to large gastric distention, otherwise without obstruction.  NG placed for LIS with moderate to large output for several days.  Increased abdominal pain 7/17 after clamping for 4 hours, tolerated clamping today without issue.  Gastric emptying study (7/20) with severe gastric emptying delay (95% retention at 4 hours).   - Schedule simethicone   - Continue with TF via NJ and NG to LIS, IR to place PEG/J tube (delayed by supply chain issue, timeline TBD)  - Additional management per primary     C diff infection: Abdominal pain with diarrhea noted 7/8, AXR without dilated bowel, moderate colonic stool burden.  C diff positive 7/11.  Loose stools (on tube feeds) stable.  - PO vancomycin (7/11) per primary team     Hypomagnesemia: Suppressed absorption d/t CNI.   - Mag oxide 400 mg daily  - Continue daily magnesium with  "additional replacement protocol prn    We appreciate the excellent care provided by the [place] [id number] team.  Recommendations communicated via in person rounding and this note.  Will continue to follow along closely, please do not hesitate to call with any questions or concerns.    Patient discussed with Dr. Castillo.    Yuliet Aviles PA-C  Inpatient [facility]  Pulmonary CF/Transplant     Subjective & Interval History:     On RA during visit with SpO2 ranging from high 80s to high 90s, up to 2L via oxymask at times.  Tolerating BiPAP overnight.  Dyspnea improving.  Infrequent nonproductive cough.  Using IS and Aerobika.  Received chest physiotherapy QID yesterday.  Right chest tube remains with moderate output.  No change in occasional abdominal cramping and loose stools.    Review of Systems:     C: No fever, no chills, no change in weight  INTEGUMENTARY/SKIN: No rash or obvious new lesions  ENT/MOUTH: No sore throat, no sinus pain, no nasal congestion or drainage  RESP: See interval history  CV: No chest pain, + peripheral edema  GI: No nausea, no vomiting, no reflux symptoms  : No dysuria  MUSCULOSKELETAL: + incisional pain  ENDOCRINE: + blood sugars intermittently elevated  NEURO: No headache, no numbness or tingling  PSYCHIATRIC: Mood stable    Physical Exam:     All notes, images, and labs from past 24 hours (at minimum) were reviewed.    Vital signs:  Temp: 98.5  F (36.9  C) Temp src: Oral BP: 115/65 Pulse: 99   Resp: 15 SpO2: 100 % O2 Device: Oxymask Oxygen Delivery: 2 LPM Height: 157.5 cm (5' 2\") Weight: 71.9 kg (158 lb 8 oz)  I/O:     Intake/Output Summary (Last 24 hours) at 7/22/2022 1818  Last data filed at 7/22/2022 1800  Gross per 24 hour   Intake 1201 ml   Output 380 ml   Net 821 ml     Constitutional: Sitting in chair, in no apparent distress.   HEENT: Eyes with pink conjunctivae, anicteric.  Oral mucosa moist without lesions.  NJ and NG tube.  PULM: Diminished air flow to left > right base.  No " crackles, no rhonchi, no wheezes.  Non-labored breathing on RA.  CV: Normal S1 and S2.  RRR.  No murmur, gallop, or rub.  Trace BLE edema.   ABD: NABS, softly distended, nontender.  MSK: Moves all extremities.  NEURO: Alert, conversant.   SKIN: Warm, dry.  No rash on limited exam.  Clamshell incision not visualized.   PSYCH: Mood stable.     Lines, Drains, and Devices:  Peripheral IV 07/20/22 Anterior;Right Lower forearm (Active)   Site Assessment [facility] 07/22/22 1600   Line Status Saline locked 07/22/22 1600   Dressing Intervention New dressing  07/20/22 1548   Phlebitis Scale 0-->no symptoms 07/22/22 1600   Infiltration Scale 0 07/22/22 1600   Number of days: 2       CVC Double Lumen Right Internal jugular Non - tunneled (Active)   Site Assessment [facility] 07/22/22 1600   Dressing Type Gauze 07/22/22 1600   Dressing Status clean;dry;intact 07/22/22 1600   Dressing Change Due 07/24/22 07/22/22 0800   Tubing Change primary tubing 07/15/22 1654   Line Necessity yes, meets criteria 07/22/22 0800   Blue - Status saline locked 07/22/22 0800   Blue - Cap Change Due 07/22/22 07/22/22 0800   Red - Status saline locked 07/22/22 0800   Red - Cap Change Due 07/22/22 07/22/22 0800   Phlebitis Scale 0-->no symptoms 07/14/22 1200   Infiltration? no 07/20/22 0400   Infiltration Scale 0 07/20/22 0400   Infiltration Site Treatment Method  None 07/20/22 0400   Was a vesicant infusing? no 07/20/22 0400   CVC Comment HD Line removed bedside by Nephrology Staff 07/22/22 1200   Number of days: 8     Data:     LABS    CMP:   Recent Labs   Lab 07/22/22  1531 07/22/22  1217 07/22/22  0757 07/22/22  0729 07/21/22  1211 07/21/22  0955 07/21/22  0413 07/21/22  0122 07/20/22  0752 07/20/22  0648 07/19/22  0810 07/19/22  0721 07/18/22  0537 07/18/22  0532   NA  --   --   --  138  --  139  --  143  --  135*  --  135*  --  131*   POTASSIUM  --   --   --  3.6  --  3.7  --  4.2  --  3.6  --  3.5  --  4.3   CHLORIDE  --   --   --  93*  --  95*  --  96*  --   92*  --  94*  --  94*   CO2  --   --   --  41*  --  32*  --  25  --  31*  --  34*  --  31*   ANIONGAP  --   --   --  4*  --  12  --  22*  --  12  --  7  --  6*   * 125* 151* 164*   < > 164*   < > 151*   < > 101*   < > 104*   < > 202*   BUN  --   --   --  92.6*  --  89.3*  --  86.6*  --  87.0*  --  80.7*  --  59.5*   CR  --   --   --  1.58*  --  1.46*  --  1.62*  --  1.80*  --  2.06*  --  2.04*   GFRESTIMATED  --   --   --  37*  --  41*  --  36*  --  32*  --  27*  --  27*   ESTUARDO  --   --   --  9.0  --  8.6*  --  7.9*  --  8.5*  --  8.5*  --  8.6*   MAG  --   --   --  2.8*  --  2.6*  --   --   --  2.9*  --  3.0*  --  2.8*   PHOS  --   --   --  5.4*  --  5.0*  --   --   --  7.2*  --  7.3*  --  5.7*   PROTTOTAL  --   --   --   --   --  4.9*  --   --   --  4.9*  --   --   --  4.7*   ALBUMIN  --   --   --   --   --  2.8*  --   --   --   --   --   --   --  2.8*   BILITOTAL  --   --   --   --   --  0.2  --   --   --   --   --   --   --  0.2   ALKPHOS  --   --   --   --   --  74  --   --   --   --   --   --   --  85   AST  --   --   --   --   --  17  --   --   --   --   --   --   --  19   ALT  --   --   --   --   --  18  --   --   --   --   --   --   --  21    < > = values in this interval not displayed.     CBC:   Recent Labs   Lab 07/22/22  0729 07/21/22  0955 07/20/22  0648 07/19/22  0721   WBC 8.6 9.2 8.5 10.9   RBC 2.44* 2.70* 2.88* 2.63*   HGB 7.8* 8.4* 9.1* 8.3*   HCT 25.1* 27.6* 28.9* 25.9*   * 102* 100 99   MCH 32.0 31.1 31.6 31.6   MCHC 31.1* 30.4* 31.5 32.0   RDW 17.5* 17.6* 17.1* 16.6*    305 264 243       INR: No lab results found in last 7 days.    Glucose:   Recent Labs   Lab 07/22/22  1531 07/22/22  1217 07/22/22  0757 07/22/22  0729 07/22/22  0444 07/21/22  1940   * 125* 151* 164* 193* 204*       Blood Gas:   Recent Labs   Lab 07/22/22  0729 07/21/22  0954 07/20/22  0648   PHV 7.36 7.37 7.32   PCO2V 74* 67* 75*   PO2V 50* 50* 35   HCO3V 42* 39* 38*   LINDY 14.1* 11.3* 9.0*   O2PER 2  21 30       Culture Data No results for input(s): CULT in the last 168 hours.    Virology Data:   Lab Results   Component Value Date    FLUAH1 Not Detected 06/29/2022    FLUAH3 Not Detected 06/29/2022    [id number] Not Detected 06/29/2022    IFLUB Not Detected 06/29/2022    RSVA Not Detected 06/29/2022    RSVB Not Detected 06/29/2022    PIV1 Not Detected 06/29/2022    PIV2 Not Detected 06/29/2022    PIV3 Not Detected 06/29/2022    HMPV Not Detected 06/29/2022       Historical CMV results (last 3 of prior testing):  No results found for: CMVQNT  No results found for: CMVLOG    Urine Studies    Recent Labs   Lab Test 07/08/22  0831 07/05/22  1004   URINEPH 5.5 5.5   NITRITE Negative Negative   LEUKEST Negative Negative   WBCU 1 5       Most Recent [place] Pulmonary Function Testing (FVC/FEV1 only)  FVC-Pre   Date Value Ref Range Status   04/29/2022 1.82 L    11/11/2021 2.17 L    06/14/2021 2.00 L      FVC-%Pred-Pre   Date Value Ref Range Status   04/29/2022 58 %    11/11/2021 70 %    06/14/2021 64 %      FEV1-Pre   Date Value Ref Range Status   04/29/2022 0.51 L    11/11/2021 0.53 L    06/14/2021 0.54 L      FEV1-%Pred-Pre   Date Value Ref Range Status   04/29/2022 20 %    11/11/2021 21 %    06/14/2021 21 %        IMAGING    Recent Results (from the past 48 hour(s))   XR Chest 2 Views    Narrative    EXAM: XR CHEST 2 VIEWS  7/20/2022 8:01 PM     HISTORY:  pulled left pleural tube       COMPARISON:  7/19/2022    FINDINGS  Technique: Upright PA and lateral views of the chest.    Devices: Left chest tube removed. Right basilar chest tube remains.  Clamshell sternotomy wires. Right internal jugular catheter terminates  over the high SVC. Gastric tube and feeding tube pass below the left  hemidiaphragm and inferior to the field of view.    Biapical pleural effusions. Persistent loculated basilar left pleural  collection, with associated atelectasis. Hazy masslike lesion in the  left upper lobe, better visualized on CT.  Persistent right  hilar/basilar atelectasis. Cardiac silhouette is stable in size.        Impression    IMPRESSION:   1. Interval removal of left basilar chest tube. Persistent loculated  left basilar pleural collection/hydropneumothorax with associated  atelectasis.  2. Right basilar chest tube remains in place.  3. Stable biapical pleural effusions.    I have personally reviewed the examination and initial interpretation  and I agree with the findings.    GEORGE ROGERS MD         SYSTEM ID:  [id number]   XR Chest Port 1 View    Narrative    Portable chest    INDICATION: Right basilar chest tube post lung transplant    COMPARISON: 7/20/2022    FINDINGS: Heart is upper normal size. Left costophrenic angle blunting  unchanged. Right basilar thoracostomy tube again present. Clamshell  sternotomy from prior bilateral lung transplant. Feeding tube and  NG/OG tube again present. Right IJ catheter tip in the SVC.      Impression    IMPRESSION: Continued left pleural effusion. Prior bilateral lung  transplant.    ALVINA HARRY MD         SYSTEM ID:  [id number]   XR Chest Port 1 View    Narrative    EXAM: XR CHEST PORT 1 VIEW  7/22/2022 10:46 AM    HISTORY: 62 years Female Right basilar chest tube s/p lung transplant.      COMPARISON: Chest radiograph 7/21/2022.    TECHNIQUE: Portable AP view of the chest.     FINDINGS:   Post surgical changes of bilateral lung transplant. Clamshell  sternotomy wires appear intact. Surgical clips overlie the  mediastinum. Right basilar chest tube in place. Interval removal of  right internal jugular central venous catheter. Larger diameter  enteric tube courses below the level of the diaphragm and out of the  field-of-view. Smaller diameter enteric tube also courses out of the  field of view; however, sidehole is now visible immediately below the  level the diaphragm.    Midline trachea. Stable cardiomediastinal silhouette with some  blurring of the left heart border and loss of the  left  hemidiaphragmatic silhouette. Slightly increased left-sided pleural  effusion with fluid present in the left major fissure. No discernible  pneumothorax. Similar appearance of hazy perihilar and left basilar  opacities.      Impression    IMPRESSION:   1.  Slight increase in size of the left pleural effusion with fluid  tracking along the left major fissure.   2.  Hazy perihilar opacities, likely atelectasis versus mild edema.  3.  Enteric tube sidehole projects immediately inferior to the  diaphragm. Consider advancement. Remainder of support devices appear  stable.    I have personally reviewed the examination and initial interpretation  and I agree with the findings.    ALVINA HARRY MD         SYSTEM ID:  [id number]

## 2022-07-23 ENCOUNTER — APPOINTMENT (OUTPATIENT)
Dept: CT IMAGING | Facility: CLINIC | Age: 62
DRG: 007 | End: 2022-07-23
Attending: STUDENT IN AN ORGANIZED HEALTH CARE EDUCATION/TRAINING PROGRAM
Payer: MEDICARE

## 2022-07-23 ENCOUNTER — APPOINTMENT (OUTPATIENT)
Dept: PHYSICAL THERAPY | Facility: CLINIC | Age: 62
DRG: 007 | End: 2022-07-23
Attending: THORACIC SURGERY (CARDIOTHORACIC VASCULAR SURGERY)
Payer: MEDICARE

## 2022-07-23 ENCOUNTER — APPOINTMENT (OUTPATIENT)
Dept: GENERAL RADIOLOGY | Facility: CLINIC | Age: 62
DRG: 007 | End: 2022-07-23
Attending: STUDENT IN AN ORGANIZED HEALTH CARE EDUCATION/TRAINING PROGRAM
Payer: MEDICARE

## 2022-07-23 ENCOUNTER — APPOINTMENT (OUTPATIENT)
Dept: OCCUPATIONAL THERAPY | Facility: CLINIC | Age: 62
DRG: 007 | End: 2022-07-23
Attending: THORACIC SURGERY (CARDIOTHORACIC VASCULAR SURGERY)
Payer: MEDICARE

## 2022-07-23 LAB
ANION GAP SERPL CALCULATED.3IONS-SCNC: 7 MMOL/L (ref 7–15)
BASE EXCESS BLDV CALC-SCNC: 18.8 MMOL/L (ref -7.7–1.9)
BASOPHILS # BLD AUTO: 0 10E3/UL (ref 0–0.2)
BASOPHILS NFR BLD AUTO: 0 %
BUN SERPL-MCNC: 88.6 MG/DL (ref 8–23)
CALCIUM SERPL-MCNC: 9.3 MG/DL (ref 8.8–10.2)
CHLORIDE SERPL-SCNC: 93 MMOL/L (ref 98–107)
CREAT SERPL-MCNC: 1.24 MG/DL (ref 0.51–0.95)
DEPRECATED HCO3 PLAS-SCNC: 40 MMOL/L (ref 22–29)
EOSINOPHIL # BLD AUTO: 0.2 10E3/UL (ref 0–0.7)
EOSINOPHIL NFR BLD AUTO: 2 %
ERYTHROCYTE [DISTWIDTH] IN BLOOD BY AUTOMATED COUNT: 17.1 % (ref 10–15)
GFR SERPL CREATININE-BSD FRML MDRD: 50 ML/MIN/1.73M2
GLUCOSE BLDC GLUCOMTR-MCNC: 122 MG/DL (ref 70–99)
GLUCOSE BLDC GLUCOMTR-MCNC: 161 MG/DL (ref 70–99)
GLUCOSE BLDC GLUCOMTR-MCNC: 162 MG/DL (ref 70–99)
GLUCOSE BLDC GLUCOMTR-MCNC: 164 MG/DL (ref 70–99)
GLUCOSE BLDC GLUCOMTR-MCNC: 165 MG/DL (ref 70–99)
GLUCOSE BLDC GLUCOMTR-MCNC: 168 MG/DL (ref 70–99)
GLUCOSE BLDC GLUCOMTR-MCNC: 170 MG/DL (ref 70–99)
GLUCOSE BLDC GLUCOMTR-MCNC: 177 MG/DL (ref 70–99)
GLUCOSE SERPL-MCNC: 151 MG/DL (ref 70–99)
HCO3 BLDV-SCNC: 47 MMOL/L (ref 21–28)
HCT VFR BLD AUTO: 25.8 % (ref 35–47)
HGB BLD-MCNC: 8 G/DL (ref 11.7–15.7)
IMM GRANULOCYTES # BLD: 0 10E3/UL
IMM GRANULOCYTES NFR BLD: 1 %
LYMPHOCYTES # BLD AUTO: 0.1 10E3/UL (ref 0.8–5.3)
LYMPHOCYTES NFR BLD AUTO: 2 %
MAGNESIUM SERPL-MCNC: 2.7 MG/DL (ref 1.7–2.3)
MCH RBC QN AUTO: 31.7 PG (ref 26.5–33)
MCHC RBC AUTO-ENTMCNC: 31 G/DL (ref 31.5–36.5)
MCV RBC AUTO: 102 FL (ref 78–100)
MONOCYTES # BLD AUTO: 0.5 10E3/UL (ref 0–1.3)
MONOCYTES NFR BLD AUTO: 6 %
NEUTROPHILS # BLD AUTO: 6.9 10E3/UL (ref 1.6–8.3)
NEUTROPHILS NFR BLD AUTO: 89 %
NRBC # BLD AUTO: 0 10E3/UL
NRBC BLD AUTO-RTO: 0 /100
O2/TOTAL GAS SETTING VFR VENT: 28 %
PCO2 BLDV: 80 MM HG (ref 40–50)
PH BLDV: 7.38 [PH] (ref 7.32–7.43)
PHOSPHATE SERPL-MCNC: 4.3 MG/DL (ref 2.5–4.5)
PLATELET # BLD AUTO: 256 10E3/UL (ref 150–450)
PO2 BLDV: 60 MM HG (ref 25–47)
POTASSIUM SERPL-SCNC: 3.7 MMOL/L (ref 3.4–5.3)
RBC # BLD AUTO: 2.52 10E6/UL (ref 3.8–5.2)
SODIUM SERPL-SCNC: 140 MMOL/L (ref 136–145)
UFH PPP CHRO-ACNC: 0.31 IU/ML
WBC # BLD AUTO: 7.8 10E3/UL (ref 4–11)

## 2022-07-23 PROCEDURE — 71045 X-RAY EXAM CHEST 1 VIEW: CPT

## 2022-07-23 PROCEDURE — 250N000012 HC RX MED GY IP 250 OP 636 PS 637: Performed by: PHYSICIAN ASSISTANT

## 2022-07-23 PROCEDURE — 250N000013 HC RX MED GY IP 250 OP 250 PS 637: Performed by: NURSE PRACTITIONER

## 2022-07-23 PROCEDURE — 250N000012 HC RX MED GY IP 250 OP 636 PS 637: Performed by: NURSE PRACTITIONER

## 2022-07-23 PROCEDURE — 71045 X-RAY EXAM CHEST 1 VIEW: CPT | Mod: 26 | Performed by: RADIOLOGY

## 2022-07-23 PROCEDURE — 36415 COLL VENOUS BLD VENIPUNCTURE: CPT | Performed by: NURSE PRACTITIONER

## 2022-07-23 PROCEDURE — 250N000012 HC RX MED GY IP 250 OP 636 PS 637: Performed by: THORACIC SURGERY (CARDIOTHORACIC VASCULAR SURGERY)

## 2022-07-23 PROCEDURE — 94640 AIRWAY INHALATION TREATMENT: CPT | Mod: 76

## 2022-07-23 PROCEDURE — 250N000013 HC RX MED GY IP 250 OP 250 PS 637

## 2022-07-23 PROCEDURE — 94660 CPAP INITIATION&MGMT: CPT

## 2022-07-23 PROCEDURE — 97535 SELF CARE MNGMENT TRAINING: CPT | Mod: GO | Performed by: OCCUPATIONAL THERAPIST

## 2022-07-23 PROCEDURE — 97116 GAIT TRAINING THERAPY: CPT | Mod: GP | Performed by: PHYSICAL THERAPIST

## 2022-07-23 PROCEDURE — 999N000157 HC STATISTIC RCP TIME EA 10 MIN

## 2022-07-23 PROCEDURE — 99233 SBSQ HOSP IP/OBS HIGH 50: CPT | Performed by: STUDENT IN AN ORGANIZED HEALTH CARE EDUCATION/TRAINING PROGRAM

## 2022-07-23 PROCEDURE — 250N000013 HC RX MED GY IP 250 OP 250 PS 637: Performed by: STUDENT IN AN ORGANIZED HEALTH CARE EDUCATION/TRAINING PROGRAM

## 2022-07-23 PROCEDURE — 85520 HEPARIN ASSAY: CPT | Performed by: STUDENT IN AN ORGANIZED HEALTH CARE EDUCATION/TRAINING PROGRAM

## 2022-07-23 PROCEDURE — 250N000011 HC RX IP 250 OP 636

## 2022-07-23 PROCEDURE — G1010 CDSM STANSON: HCPCS

## 2022-07-23 PROCEDURE — 94642 AEROSOL INHALATION TREATMENT: CPT

## 2022-07-23 PROCEDURE — 250N000013 HC RX MED GY IP 250 OP 250 PS 637: Performed by: SURGERY

## 2022-07-23 PROCEDURE — 36415 COLL VENOUS BLD VENIPUNCTURE: CPT | Performed by: STUDENT IN AN ORGANIZED HEALTH CARE EDUCATION/TRAINING PROGRAM

## 2022-07-23 PROCEDURE — 84100 ASSAY OF PHOSPHORUS: CPT | Performed by: SURGERY

## 2022-07-23 PROCEDURE — 85025 COMPLETE CBC W/AUTO DIFF WBC: CPT | Performed by: NURSE PRACTITIONER

## 2022-07-23 PROCEDURE — 250N000013 HC RX MED GY IP 250 OP 250 PS 637: Performed by: THORACIC SURGERY (CARDIOTHORACIC VASCULAR SURGERY)

## 2022-07-23 PROCEDURE — 83735 ASSAY OF MAGNESIUM: CPT | Performed by: SURGERY

## 2022-07-23 PROCEDURE — 80048 BASIC METABOLIC PNL TOTAL CA: CPT | Performed by: NURSE PRACTITIONER

## 2022-07-23 PROCEDURE — 82803 BLOOD GASES ANY COMBINATION: CPT | Performed by: NURSE PRACTITIONER

## 2022-07-23 PROCEDURE — G1010 CDSM STANSON: HCPCS | Mod: GC | Performed by: STUDENT IN AN ORGANIZED HEALTH CARE EDUCATION/TRAINING PROGRAM

## 2022-07-23 PROCEDURE — 94668 MNPJ CHEST WALL SBSQ: CPT

## 2022-07-23 PROCEDURE — 97110 THERAPEUTIC EXERCISES: CPT | Mod: GO | Performed by: OCCUPATIONAL THERAPIST

## 2022-07-23 PROCEDURE — 97530 THERAPEUTIC ACTIVITIES: CPT | Mod: GP | Performed by: PHYSICAL THERAPIST

## 2022-07-23 PROCEDURE — 250N000009 HC RX 250: Performed by: SURGERY

## 2022-07-23 PROCEDURE — 120N000002 HC R&B MED SURG/OB UMMC

## 2022-07-23 PROCEDURE — 99233 SBSQ HOSP IP/OBS HIGH 50: CPT | Mod: 24 | Performed by: PHYSICIAN ASSISTANT

## 2022-07-23 PROCEDURE — 250N000013 HC RX MED GY IP 250 OP 250 PS 637: Performed by: HOSPITALIST

## 2022-07-23 PROCEDURE — 94640 AIRWAY INHALATION TREATMENT: CPT

## 2022-07-23 PROCEDURE — 250N000011 HC RX IP 250 OP 636: Performed by: STUDENT IN AN ORGANIZED HEALTH CARE EDUCATION/TRAINING PROGRAM

## 2022-07-23 PROCEDURE — 71250 CT THORAX DX C-: CPT | Mod: 26 | Performed by: STUDENT IN AN ORGANIZED HEALTH CARE EDUCATION/TRAINING PROGRAM

## 2022-07-23 RX ORDER — MYCOPHENOLIC ACID 360 MG/1
720 TABLET, DELAYED RELEASE ORAL 2 TIMES DAILY
Status: DISCONTINUED | OUTPATIENT
Start: 2022-07-23 | End: 2022-07-25

## 2022-07-23 RX ORDER — SIMETHICONE 40MG/0.6ML
40 SUSPENSION, DROPS(FINAL DOSAGE FORM)(ML) ORAL EVERY 6 HOURS PRN
Status: DISCONTINUED | OUTPATIENT
Start: 2022-07-23 | End: 2022-07-28

## 2022-07-23 RX ORDER — MYCOPHENOLATE MOFETIL 200 MG/ML
750 POWDER, FOR SUSPENSION ORAL 2 TIMES DAILY
Status: DISCONTINUED | OUTPATIENT
Start: 2022-07-23 | End: 2022-07-23

## 2022-07-23 RX ORDER — POTASSIUM CHLORIDE 1.5 G/1.58G
20 POWDER, FOR SOLUTION ORAL ONCE
Status: COMPLETED | OUTPATIENT
Start: 2022-07-23 | End: 2022-07-23

## 2022-07-23 RX ORDER — VALGANCICLOVIR HYDROCHLORIDE 50 MG/ML
450 POWDER, FOR SOLUTION ORAL DAILY
Status: DISCONTINUED | OUTPATIENT
Start: 2022-07-24 | End: 2022-07-28

## 2022-07-23 RX ADMIN — SIMETHICONE 40 MG: 20 SUSPENSION/ DROPS ORAL at 17:20

## 2022-07-23 RX ADMIN — THERA TABS 1 TABLET: TAB at 12:43

## 2022-07-23 RX ADMIN — ACETYLCYSTEINE 2 ML: 200 SOLUTION ORAL; RESPIRATORY (INHALATION) at 08:52

## 2022-07-23 RX ADMIN — INSULIN ASPART 2 UNITS: 100 INJECTION, SOLUTION INTRAVENOUS; SUBCUTANEOUS at 23:28

## 2022-07-23 RX ADMIN — ACETYLCYSTEINE 2 ML: 200 SOLUTION ORAL; RESPIRATORY (INHALATION) at 20:39

## 2022-07-23 RX ADMIN — MYCOPHENOLIC ACID 720 MG: 360 TABLET, DELAYED RELEASE ORAL at 20:10

## 2022-07-23 RX ADMIN — MYCOPHENOLATE MOFETIL 1000 MG: 200 POWDER, FOR SUSPENSION ORAL at 07:41

## 2022-07-23 RX ADMIN — INSULIN ASPART 2 UNITS: 100 INJECTION, SOLUTION INTRAVENOUS; SUBCUTANEOUS at 20:22

## 2022-07-23 RX ADMIN — ACETYLCYSTEINE 2 ML: 200 SOLUTION ORAL; RESPIRATORY (INHALATION) at 12:57

## 2022-07-23 RX ADMIN — INSULIN GLARGINE 15 UNITS: 100 INJECTION, SOLUTION SUBCUTANEOUS at 07:51

## 2022-07-23 RX ADMIN — INSULIN ASPART 1 UNITS: 100 INJECTION, SOLUTION INTRAVENOUS; SUBCUTANEOUS at 00:17

## 2022-07-23 RX ADMIN — Medication 5 MG: at 07:03

## 2022-07-23 RX ADMIN — LEVALBUTEROL HYDROCHLORIDE 1.25 MG: 1.25 SOLUTION RESPIRATORY (INHALATION) at 16:15

## 2022-07-23 RX ADMIN — THIAMINE HCL TAB 100 MG 100 MG: 100 TAB at 10:18

## 2022-07-23 RX ADMIN — METOPROLOL TARTRATE 25 MG: 25 TABLET, FILM COATED ORAL at 07:38

## 2022-07-23 RX ADMIN — Medication 5 MG: at 02:59

## 2022-07-23 RX ADMIN — VANCOMYCIN HYDROCHLORIDE 125 MG: KIT at 07:40

## 2022-07-23 RX ADMIN — Medication 1 CAPSULE: at 10:19

## 2022-07-23 RX ADMIN — PREDNISONE 15 MG: 5 TABLET ORAL at 07:39

## 2022-07-23 RX ADMIN — NYSTATIN: 100000 CREAM TOPICAL at 07:51

## 2022-07-23 RX ADMIN — TACROLIMUS 5 MG: 5 CAPSULE ORAL at 17:24

## 2022-07-23 RX ADMIN — Medication 1 CAPSULE: at 20:11

## 2022-07-23 RX ADMIN — POTASSIUM CHLORIDE 20 MEQ: 1.5 POWDER, FOR SOLUTION ORAL at 15:50

## 2022-07-23 RX ADMIN — CALCIUM CARBONATE 600 MG (1,500 MG)-VITAMIN D3 400 UNIT TABLET 1 TABLET: at 17:20

## 2022-07-23 RX ADMIN — HEPARIN SODIUM 900 UNITS/HR: 10000 INJECTION, SOLUTION INTRAVENOUS at 02:06

## 2022-07-23 RX ADMIN — ACETYLCYSTEINE 2 ML: 200 SOLUTION ORAL; RESPIRATORY (INHALATION) at 16:20

## 2022-07-23 RX ADMIN — VANCOMYCIN HYDROCHLORIDE 125 MG: KIT at 15:49

## 2022-07-23 RX ADMIN — TACROLIMUS 5 MG: 5 CAPSULE ORAL at 07:40

## 2022-07-23 RX ADMIN — Medication 5 MG: at 20:11

## 2022-07-23 RX ADMIN — ACETAMINOPHEN 650 MG: 325 TABLET, FILM COATED ORAL at 05:34

## 2022-07-23 RX ADMIN — LEVALBUTEROL HYDROCHLORIDE 1.25 MG: 1.25 SOLUTION RESPIRATORY (INHALATION) at 20:39

## 2022-07-23 RX ADMIN — VANCOMYCIN HYDROCHLORIDE 125 MG: KIT at 02:07

## 2022-07-23 RX ADMIN — INSULIN ASPART 2 UNITS: 100 INJECTION, SOLUTION INTRAVENOUS; SUBCUTANEOUS at 03:39

## 2022-07-23 RX ADMIN — NYSTATIN 1000000 UNITS: 100000 SUSPENSION ORAL at 07:38

## 2022-07-23 RX ADMIN — NYSTATIN 1000000 UNITS: 100000 SUSPENSION ORAL at 17:20

## 2022-07-23 RX ADMIN — ACETAMINOPHEN 975 MG: 325 TABLET, FILM COATED ORAL at 15:50

## 2022-07-23 RX ADMIN — NYSTATIN 1000000 UNITS: 100000 SUSPENSION ORAL at 22:00

## 2022-07-23 RX ADMIN — ASPIRIN 81 MG CHEWABLE TABLET 81 MG: 81 TABLET CHEWABLE at 10:19

## 2022-07-23 RX ADMIN — LEVALBUTEROL HYDROCHLORIDE 1.25 MG: 1.25 SOLUTION RESPIRATORY (INHALATION) at 12:57

## 2022-07-23 RX ADMIN — LEVALBUTEROL HYDROCHLORIDE 1.25 MG: 1.25 SOLUTION RESPIRATORY (INHALATION) at 08:52

## 2022-07-23 RX ADMIN — ONDANSETRON 4 MG: 4 TABLET, ORALLY DISINTEGRATING ORAL at 07:39

## 2022-07-23 RX ADMIN — PREDNISONE 12.5 MG: 2.5 TABLET ORAL at 20:11

## 2022-07-23 RX ADMIN — INSULIN ASPART 1 UNITS: 100 INJECTION, SOLUTION INTRAVENOUS; SUBCUTANEOUS at 12:43

## 2022-07-23 RX ADMIN — NYSTATIN: 100000 CREAM TOPICAL at 20:12

## 2022-07-23 RX ADMIN — INSULIN GLARGINE 15 UNITS: 100 INJECTION, SOLUTION SUBCUTANEOUS at 20:23

## 2022-07-23 RX ADMIN — INSULIN ASPART 1 UNITS: 100 INJECTION, SOLUTION INTRAVENOUS; SUBCUTANEOUS at 07:51

## 2022-07-23 RX ADMIN — VANCOMYCIN HYDROCHLORIDE 125 MG: KIT at 20:27

## 2022-07-23 RX ADMIN — METOPROLOL TARTRATE 25 MG: 25 TABLET, FILM COATED ORAL at 20:11

## 2022-07-23 RX ADMIN — CALCIUM CARBONATE 600 MG (1,500 MG)-VITAMIN D3 400 UNIT TABLET 1 TABLET: at 10:19

## 2022-07-23 RX ADMIN — Medication 40 MG: at 20:10

## 2022-07-23 RX ADMIN — NYSTATIN 1000000 UNITS: 100000 SUSPENSION ORAL at 15:48

## 2022-07-23 RX ADMIN — SIMETHICONE 80 MG: 80 TABLET, CHEWABLE ORAL at 07:39

## 2022-07-23 RX ADMIN — ACETAMINOPHEN 975 MG: 325 TABLET, FILM COATED ORAL at 20:11

## 2022-07-23 RX ADMIN — Medication 40 MG: at 10:18

## 2022-07-23 ASSESSMENT — ACTIVITIES OF DAILY LIVING (ADL)
ADLS_ACUITY_SCORE: 30

## 2022-07-23 NOTE — PROGRESS NOTES
Pulmonary Medicine  Cystic Fibrosis - Lung Transplant Team  Progress Note  2022       Patient: Sofie Rodriguez  MRN: [id number]  : 1960 (age 62 year old)  Transplant: 2022 (Lung), POD#25  Admission date: 2022    Assessment & Plan:     Sofie Rodriguez is a 62 year old female with a PMH significant for end-stage COPD, HTN, HFpEF, Mycobacterium peregrinum colonization, h/o hepatitis C, HECTOR s/p LEEP procedure, osteopenia, and former methamphetamine use.  Pt. is now s/p BSLT on 22, lungs slightly undersized.   Persistent low dose pressor needs post-op through 7/10.  Extubated POD #2 but with persistent hypercapnia, mostly compensated and only slightly improved with intermittent NIPPV.  Also with left radial artery thrombus (presumed secondary to arterial line) s/p thrombectomy 7, BACILIO, and C diff.  Rehabilitation and airway clearance limited by dysrhythmia and gastric discomfort.     Today's recommendations:  - DSA, EBV, IgG, and donor risk labs ordered   - Following pleural cultures, right chest tube to remain given output  - CXR daily as below  - Chest CT without contrast to better evaluate left base  - Tacrolimus level ordered   - Discontinued CellCept and started Myfortic given ongoing abdominal cramping/diarrhea (Myfortic must be taken PO, unable to give via FT)  - Prednisone taper due  (not yet ordered)  - Increase dapsone to daily after one week () if hgb remains stable  - Follow pending cultures  - Transition to DOAC after PEG/J placement  - Continue with NG to LIS (except for when taking PO medications), IR to place PEG/J tube (delayed by supply chain issue, timeline TBD)  - PO vancomycin per primary team     S/p bilateral sequential lung transplant (BSLT) for end stage COPD:  Persistent hypercapneic respiratory failure:   Persistent hypotension, Resolved:   Bilateral hydroPTX:  Right hemidiaphragm palsy: Explant pathology with severe emphysema with subpleural  bullae formation, changes of chronic bronchitis, subpleural scars and patchy pulmonary edema; benign hilar lymph nodes.  No evidence of PGD post-op.  Extubated 6/30.  Persistent hypercapnia without dyspnea, appears to be a respiratory drive problem.  Some improvement with BiPAP, limited tolerance in part due to anxiety.  Persistent low dose pressors weaned off 7/10, on scheduled midodrine (also s/p hydrocortisone 7/6-7/9 and fludrocortisone 7/6-7/11).  SNIFF (7/14) notable for right hemidiaphragm palsy.  Chest CT (7/18) with bilateral biapical effusions R>L and improved LLL atelectasis, also with LLL hydroPTX and bilateral PTX; bilateral chest tubes not communicating well with loculated effusion areas (personally reviewed).  Bronch (7/19), no bronch note recorded yet.  On RA-3L during the day with BiPAP overnight (ongoing hypercapnia).  - DSA one month post-transplant (7/28, ordered)  - Ammonia monitoring twice weekly (screening for hyperammonemia post-lung transplant)  - Nebs: levalbuterol and Mucomyst QID   - Pulmonary toilet with chest physiotherapy QID, encourage consistent participation d/t concern for ineffective airway clearance  - Aerobika and incentive spirometry hourly while awake  - Supplemental oxygen as needed to maintain SpO2 >92% (wean as able), NIPPV overnight/with naps given persistent hypercapnia  - Chest tubes managed by surgical team, right tube remains with moderate output  - CXR daily, today with stable bilateral pleural effusions and left basilar opacity (personally reviewed); note: enteric tube side port near diaphragm (discussed with primary team)  - Chest CT without contrast to better evaluate left base  - Volume management per primary team  - Encourage increased activity including up in chair (minimal day time in bed) and active participation in PT/OT given concern for deconditioning and ineffective airway clearance     Immunosuppression:  Induction therapy with basiliximab (and high dose  IV steroid) given intraoperatively, repeating basiliximab dose on POD#4.  - Tacrolimus 5 mg BID (via NJ tube, held 7/11-7/12 for supratherapeutic level, peak level appropriate 7/11).  Goal level 8-12.  Repeat level 7/25 (orderd).  - MMF 1000 mg BID (decreased 7/10 due to diarrhea) --> adjust to Myfortic 720 mg BID given persistent GI symptoms (ordered, Pt. tolerating some pills by mouth)  - Prednisone 15 mg qAM / 12.5 mg qPM, next taper due 7/28 (not yet ordered)  Date AM dose (mg) PM dose (mg)   7/21/22 15 12.5   7/28/22 12.5 12.5   8/4/22 12.5 10   8/18/22 10 10   9/15/22 10 7.5   10/13/22 7.5 7.5   11/10/22 7.5 5   12/8/22 5 5   1/5/23 5 2.5      Prophylaxis:   - Dapsone for PJP ppx (7/18), increase to daily after one week (7/25) if hgb remains stable able (Bactrim held 7/7 for BACILIO)  - VGCV for CMV ppx  - Nystatin for oral candidiasis ppx, 6 month course  - See below for serologies and viral ppx:    Donor Recipient Intervention   CMV status Positive Positive Valganciclovir POD #8-90   EBV status Positive Positive EBV check monthly (7/28, ordered)   HSV status N/A Positive Not indicated      ID:  H/o M. peregrinum colonization: Donor bronch cultures (OSH) with Strep beta hemolytic (not group A).  Recipient cultures as below.  Bronch cultures (7/12) NGTD.  - IgG at one month (7/28, ordered)  - AFB bronch culture (6/28, 6/29, 7/12) NGTD, AFB to be sent on all future bronchs  - Right pleural cultures (7/20) NGTD, follow     Streptococcus pneumoniae:  Stenotrophomonas maltophilia: Noted in recipient cultures at time of transplant.  S/p ceftazidime 6/28-7/10, vancomycin 7/7-7/8 and 6/28-6/30, and levofloxacin 7/10-7/12 for total 2 week ABX course.     H/o hepatitis C: Diagnosed in 1980s, 2 mos of treatment, quant negative since 10/2017, last positive 2/20/17 ([phone]).  Glenis positive on 6/2021 with negative HCV PCR.  H/o remote EtOH abuse.  MR elastography (4/27/21) with hepatology review and consult without any  concerns post transplant.  Hepatitis C RNA negative and hepatitis C antibody positive (old) on 6/28.     PHS risk criteria donor:  Additional labs required post-transplant (between 4-8 weeks post-op): Hepatitis B, Hepatitis C, and HIV by SHERI ([id number], ordered 7/28).     Other relevant problems managed by primary team:      SVT:   Aflutter with RVR: SVT first noted on 7/14, prior to HD line placement.  Continues intermittently.  Aflutter with RVR to 200 7/17 triggered by activity.  EP consulted 7/17 given persistent tachycardia/dysrhythmia.  Midodrine held 7/19.  - Metoprolol per primary team (started 7/15)  - Heparin gtt (7/17) and then transition to DOAC after PEG/J placement     Left hand ischemia: Radial artery thrombosis identified on duplex doppler.  S/p thrombectomy on 7/2.  Completed high intensity heparin course.  Continue daily aspirin.      BACILIO:   Hyperkalemia: Likely multifactorial including medications (Bactrim, tacrolimus) and hypotension.  Fludrocortisone 7/6-7/11.  Potassium now stable.  S/p non-tunneled HD line 7/14.  Initial iHD run 7/14 limited by afib with RVR vs SVT.  Last iHD run 7/16, line removed 7/22.  - Tacrolimus monitoring as above  - Management per nephrology and primary team     Abdominal pain: Noted 7/15, cramping pain.  ACR with large stool burden in colon, no obstruction or distention.  Some nausea but no emesis.  CT abdomen (7/15) with moderate to large gastric distention, otherwise without obstruction.  NG placed for LIS with moderate to large output for several days.  Increased abdominal pain 7/17 after clamping for 4 hours, tolerated clamping today without issue.  Gastric emptying study (7/20) with severe gastric emptying delay (95% retention at 4 hours).   - Simethicone prn  - Continue with TF via NJ and NG to LIS (except for when taking PO medications), IR to place PEG/J tube (delayed by supply chain issue, timeline TBD)  - Additional management per primary     C  "diff infection: Abdominal pain with diarrhea noted 7/8, AXR without dilated bowel, moderate colonic stool burden.  C diff positive 7/11.  Loose stools (on tube feeds) stable.  - PO vancomycin (7/11) per primary team     Hypomagnesemia: Suppressed absorption d/t CNI.   - Continue daily magnesium with replacement protocol prn, schedule oral magnesium supplementation pending improvement in stools    We appreciate the excellent care provided by the [place] [id number] team.  Recommendations communicated via in person rounding and this note.  Will continue to follow along closely, please do not hesitate to call with any questions or concerns.    Patient discussed with Dr. Castillo.    Yuliet Aviles PA-C  Inpatient [facility]  Pulmonary CF/Transplant     Subjective & Interval History:     Remains on BiPAP overnight, 2-3L via oxymask this morning.  VBG with increased hypercapnia, denies HA or feeling more tired.  Denies dyspnea, minimal cough.  Received chest physiotherapy TID yesterday.  Right chest tube remains with output 200 ml yesterday.  Abdominal pain/cramping persists, light decrease in frequency of loose stools.      Review of Systems:     C: No fever, no chills, no change in weight  INTEGUMENTARY/SKIN: No rash or obvious new lesions  ENT/MOUTH: No sore throat, no sinus pain, no nasal congestion or drainage  RESP: See interval history  CV: No chest pain, + peripheral edema  GI: + intermittent nausea, no vomiting, no reflux symptoms  : No dysuria  MUSCULOSKELETAL: + incisional pain  ENDOCRINE: + blood sugars intermittently elevated  NEURO: No numbness or tingling  PSYCHIATRIC: Mood stable    Physical Exam:     All notes, images, and labs from past 24 hours (at minimum) were reviewed.    Vital signs:  Temp: 98.1  F (36.7  C) Temp src: Axillary BP: (!) 145/81 Pulse: 96   Resp: 14 SpO2: 100 % O2 Device: Oxymask Oxygen Delivery: 2 LPM Height: 157.5 cm (5' 2\") Weight: 70.9 kg (156 lb 4.8 oz)  I/O:     Intake/Output Summary (Last " 24 hours) at 7/23/2022 1650  Last data filed at 7/23/2022 1600  Gross per 24 hour   Intake 1474 ml   Output 615 ml   Net 859 ml     Constitutional: Lying in bed, in no apparent distress.   HEENT: Eyes with pink conjunctivae, anicteric.  Oral mucosa moist without lesions.  NJ and NG tube.  PULM: Diminished air flow to L>R base.  No crackles, no rhonchi, no wheezes.  Non-labored breathing on 2L.  CV: Normal S1 and S2.  RRR.  No murmur, gallop, or rub.  Trace BLE edema.   ABD: NABS, softly distended, nontender.    MSK: Moves all extremities.  + muscle wasting.   NEURO: Alert, conversant.   SKIN: Warm, dry.  No rash on limited exam.  Clamshell incision not visualized.   PSYCH: Mood stable.     Lines, Drains, and Devices:  Peripheral IV 07/20/22 Anterior;Right Lower forearm (Active)   Site Assessment WDL 07/23/22 1600   Line Status Infusing 07/23/22 1600   Dressing Intervention New dressing  07/20/22 1548   Phlebitis Scale 0-->no symptoms 07/23/22 1600   Infiltration Scale 0 07/23/22 1600   Number of days: 3     Data:     LABS    CMP:   Recent Labs   Lab 07/23/22  1549 07/23/22  1242 07/23/22  1059 07/23/22  0750 07/22/22  0757 07/22/22  0729 07/21/22  1211 07/21/22  0955 07/21/22  0413 07/21/22  0122 07/20/22  0752 07/20/22  0648 07/18/22  0537 07/18/22  0532   NA  --   --  140  --   --  138  --  139  --  143  --  135*   < > 131*   POTASSIUM  --   --  3.7  --   --  3.6  --  3.7  --  4.2  --  3.6   < > 4.3   CHLORIDE  --   --  93*  --   --  93*  --  95*  --  96*  --  92*   < > 94*   CO2  --   --  40*  --   --  41*  --  32*  --  25  --  31*   < > 31*   ANIONGAP  --   --  7  --   --  4*  --  12  --  22*  --  12   < > 6*   * 164* 151* 161*   < > 164*   < > 164*   < > 151*   < > 101*   < > 202*   BUN  --   --  88.6*  --   --  92.6*  --  89.3*  --  86.6*  --  87.0*   < > 59.5*   CR  --   --  1.24*  --   --  1.58*  --  1.46*  --  1.62*  --  1.80*   < > 2.04*   GFRESTIMATED  --   --  50*  --   --  37*  --  41*  --  36*   --  32*   < > 27*   ESTUARDO  --   --  9.3  --   --  9.0  --  8.6*  --  7.9*  --  8.5*   < > 8.6*   MAG  --   --  2.7*  --   --  2.8*  --  2.6*  --   --   --  2.9*   < > 2.8*   PHOS  --   --  4.3  --   --  5.4*  --  5.0*  --   --   --  7.2*   < > 5.7*   PROTTOTAL  --   --   --   --   --   --   --  4.9*  --   --   --  4.9*  --  4.7*   ALBUMIN  --   --   --   --   --   --   --  2.8*  --   --   --   --   --  2.8*   BILITOTAL  --   --   --   --   --   --   --  0.2  --   --   --   --   --  0.2   ALKPHOS  --   --   --   --   --   --   --  74  --   --   --   --   --  85   AST  --   --   --   --   --   --   --  17  --   --   --   --   --  19   ALT  --   --   --   --   --   --   --  18  --   --   --   --   --  21    < > = values in this interval not displayed.     CBC:   Recent Labs   Lab 07/23/22  1059 07/22/22  0729 07/21/22  0955 07/20/22  0648   WBC 7.8 8.6 9.2 8.5   RBC 2.52* 2.44* 2.70* 2.88*   HGB 8.0* 7.8* 8.4* 9.1*   HCT 25.8* 25.1* 27.6* 28.9*   * 103* 102* 100   MCH 31.7 32.0 31.1 31.6   MCHC 31.0* 31.1* 30.4* 31.5   RDW 17.1* 17.5* 17.6* 17.1*    250 305 264       INR: No lab results found in last 7 days.    Glucose:   Recent Labs   Lab 07/23/22  1549 07/23/22  1242 07/23/22  1059 07/23/22  0750 07/23/22  0338 07/23/22  0014   * 164* 151* 161* 168* 162*       Blood Gas:   Recent Labs   Lab 07/23/22  1110 07/22/22  0729 07/21/22  0954   PHV 7.38 7.36 7.37   PCO2V 80* 74* 67*   PO2V 60* 50* 50*   HCO3V 47* 42* 39*   LINDY 18.8* 14.1* 11.3*   O2PER 28 2 21       Culture Data No results for input(s): CULT in the last 168 hours.    Virology Data:   Lab Results   Component Value Date    FLUAH1 Not Detected 06/29/2022    FLUAH3 Not Detected 06/29/2022    YL6526 Not Detected 06/29/2022    IFLUB Not Detected 06/29/2022    RSVA Not Detected 06/29/2022    RSVB Not Detected 06/29/2022    PIV1 Not Detected 06/29/2022    PIV2 Not Detected 06/29/2022    PIV3 Not Detected 06/29/2022    HMPV Not Detected 06/29/2022        Historical CMV results (last 3 of prior testing):  No results found for: CMVQNT  No results found for: CMVLOG    Urine Studies    Recent Labs   Lab Test 07/08/22  0831 07/05/22  1004   URINEPH 5.5 5.5   NITRITE Negative Negative   LEUKEST Negative Negative   WBCU 1 5       Most Recent [facility] Pulmonary Function Testing (FVC/FEV1 only)  FVC-Pre   Date Value Ref Range Status   04/29/2022 1.82 L    11/11/2021 2.17 L    06/14/2021 2.00 L      FVC-%Pred-Pre   Date Value Ref Range Status   04/29/2022 58 %    11/11/2021 70 %    06/14/2021 64 %      FEV1-Pre   Date Value Ref Range Status   04/29/2022 0.51 L    11/11/2021 0.53 L    06/14/2021 0.54 L      FEV1-%Pred-Pre   Date Value Ref Range Status   04/29/2022 20 %    11/11/2021 21 %    06/14/2021 21 %        IMAGING    Recent Results (from the past 48 hour(s))   XR Chest Port 1 View    Narrative    EXAM: XR CHEST PORT 1 VIEW  7/22/2022 10:46 AM    HISTORY: 62 years Female Right basilar chest tube s/p lung transplant.      COMPARISON: Chest radiograph 7/21/2022.    TECHNIQUE: Portable AP view of the chest.     FINDINGS:   Post surgical changes of bilateral lung transplant. Clamshell  sternotomy wires appear intact. Surgical clips overlie the  mediastinum. Right basilar chest tube in place. Interval removal of  right internal jugular central venous catheter. Larger diameter  enteric tube courses below the level of the diaphragm and out of the  field-of-view. Smaller diameter enteric tube also courses out of the  field of view; however, sidehole is now visible immediately below the  level the diaphragm.    Midline trachea. Stable cardiomediastinal silhouette with some  blurring of the left heart border and loss of the left  hemidiaphragmatic silhouette. Slightly increased left-sided pleural  effusion with fluid present in the left major fissure. No discernible  pneumothorax. Similar appearance of hazy perihilar and left basilar  opacities.      Impression    IMPRESSION:    1.  Slight increase in size of the left pleural effusion with fluid  tracking along the left major fissure.   2.  Hazy perihilar opacities, likely atelectasis versus mild edema.  3.  Enteric tube sidehole projects immediately inferior to the  diaphragm. Consider advancement. Remainder of support devices appear  stable.    I have personally reviewed the examination and initial interpretation  and I agree with the findings.    ALVINA HARRY MD         SYSTEM ID:  [id number]   XR Chest Port 1 View    Narrative    Exam:  Chest X-ray 7/23/2022 9:19 AM    History: Right basilar chest tube s/p lung transplant    Comparison: 7/22/2022    Findings:   Single portable AP view of the chest. Enteric tube side-port is near  the diaphragm. Feeding tube is subdiaphragmatic with its tip above the  field-of-view. Slightly advanced right basilar chest tube.  Unchanged bilateral pleural effusions. Stable left hilar and basilar  opacities. No pneumothorax.      Impression    Impression:   1.  Enteric tube side port is near the diaphragm. Recommend advancing.  2.  Stable bilateral pleural effusions and left basilar opacity.    KANDACE ROJAS MD         SYSTEM ID:  [id number]

## 2022-07-23 NOTE — PLAN OF CARE
Goal Outcome Evaluation:    Plan of Care Reviewed With: patient, daughter     Outcome Evaluation: Walking in halls today. Abdominal pain continues.    Major shift events: No acute events.    Neuro: A&Ox4.   Cardiac: SR. VSS.   Respiratory: Sating >90% on RA.  GI/: Adequate urine output. BM X2.  Diet/appetite: TF held for several hours d/t abdominal cramping. Restarted at lower rate, will advance to goal as tolerated.   Activity:  Assist of 1, pivot to commode. Ambulating in room and halls with assist of 2.  Pain: Abdominal cramping, PRN simethicone given.  Skin: No new deficits noted. Dressing changes on bilateral chest.   LDA's: Right chest tube to suction. NG to LIS. NJ to continuous TF. PIV infusing heparin.     Plan: Continue with POC. Notify primary team with changes.

## 2022-07-23 NOTE — PROGRESS NOTES
Shift Summary:    No significant changes. Alert and oriented x4. Sinus rhythm to sinus tachycardia. 2L oxymask and Bipap overnight. Loose BM x2. Voided x2. Denied nausea. Pain in abdomen scheduled tylenol and 5mg of oxycodone. Heparin gtt continues with daily Xa checks. CT to -20 suction. TF continues at goal rate. Up with 1 to commode. Continue with poc.

## 2022-07-23 NOTE — PROGRESS NOTES
[facility]    Medicine Progress Note - Hospitalist Service, GOLD TEAM 10    Date of Admission:  6/28/2022    Assessment & Plan  Sofie Rodriguez is a 62 year old female admitted on 6/28/2022. She has PMH of end stage COPD s/p b/l lung transplant on 6/28/2022, HTN, HFpEF, h/o hepC, oteopenia, and former methamphetamine use, and she was transferred to [place] [address] [facility] from MICU on 7/15/2022. She was admitted to the MICU on 7/13/20222 with prior hospital course complicated by left upper extremity acute limb ischemia s/p left radial thrombectomy on 7/1/2022. She was primarily treated for acute hypercapnic respiratory failure on intermittent BIPAP with worsening BACILIO while in the MICU. Breathing is improving, but patient has severely delayed gastric emptying found on NM study. PEGJ placement ordered, pending supply chain issue resolution.       TODAY  - Goal to get PEGJ but limitation to PEGJ supplies may cause delay  - NPO, NG to LIMS minimal output, [place] for TFs  - DOAC once PEGJ placed  - Continue to encourage OOB and up in chair  - BIPAP at night  - discontinue Cellcept in lieu of Myfortic  - CT Chest without contrast   - Simethicone liquid through [place] for cramping / gas / abdominal pain  - Continue Vacomycin oral for cdiff, anticipate extended course, trending diarrhea, consideration of fidoxamicin will be discussed with ID on 7/25 if no ongoing improvement in diarrhea / abdominal pain.     End stage COPD s/p BOLT 6/28/2022  Acute hypercapnic hypoxic respiratory failure (improving), likely 2/2 decreased central respiratory drive vs respiratory muscle weakness and some pulmonary edema / fluid Transplanted 6/28. formal SNIFF test was performed on 7/14 showed R hemidiaphragm palsy. Of note transplanted lungs were also considered small for body size and could contribute to decreased respiratory compensation for hypercarbia. VBG relatively stable, most recent pCO2 74 (7/22).   -  Continue BIPAP at night   - NC with intermittent BIPAP for night and daytime naps; goal to keep O2sat above 92%  - f/u myasthenia gravis panel  (pending from 7/14)  - oxycodone 2.5-5mg q4h PRN   - encourage activity and getting up in bed; PT/OT participation  - encourage chest physiotherapy QID     Post-transplant  Immunosuppression:  - Prednisone 15mg BID  - Tacrolimus 5mg BID (goal 8-12)   - MMF 1000mg BID  Prophylasxis:  - CMV - Valgancyclover  - Thrush - PO nysatin  - PJP - TMP-SMX (currently held given BACILIO); dapsone started 7/18 at 50mg qMWF (will try to increase to daily on 7/25 per pulm)     Chest tubes  - 1 R chest tube (basilar) in place currently (pericardial drain was removed 7/15, L basilar was removed 7/20). CT chest 7/14 showing unchanged bilateral effusions and unchanged biapical pneumothoraces with resolved left basilar pneumothorax; bibasilar chest tubes in place with pericardial drain (which was removed 7/15). R tube still with ~200mL output over past day (7/22).   - daily CXR  - CV surgery and transplant pulm following     Hypotension, resolved  H/o HTN  H/o HFpEF  Likely vasoplegia post operatively. Last echo 7/7 normal EF with good LV function. S/p hydrocortisone 7/6-7/9 and fludrocortisone 7/6-7/11 without significant improvement.  - off midorine 7/19  - Holding PTA lasix 20mg daily     C.diff  Abdominal pain   Worsening diarrhea via rectal pouch with new onset abd pain that is significantly worse as of 7/15. C. Diff positive on 7/11 with vanc started on 7/12. CT abd 7/15 showed no signs of toxic megacolon, but showed very distended stomach; NG was placed 7/15 for decompression. Patient is symptomatically improved while on intermittent suction.  - PO vanc 125mg QID (7/12-7/26)  - holding bowel regimen  - gastric emptying study 7/20 showed delayed gastric emptying with retention of 95% of stomach contents after 4 hours; please keep patient NPO except tube feeds and meds  - PEGJ ordered,  placement is pending supply  - Simethicone scheduled     [place] tube  Feeding regimen:   - Adult Formula Drip Feeding: Continuous Novasource Renal; Nasojejunal; Goal Rate: 35; initiate at goal rate; mL/hr; Medication - Feeding Tube Flush Frequency: At least 15-30 mL water before and after medication administration and with tube clogging     BACILIO  Hypermagnesemia  Hyperphosphatemia  Baseline renal function was normal prior to surgery. New onset renal failure after transplant, likely multifactorial due to pre-renal hypotension, less likely nephrotoxic agents. Overall kidney function improving. Today, Cr fluctuating but BUN increasing, with unclear urine output (7/22).   - HD cath removed today 7/22  - R internal jugular non-tunneled cathether placed with HD line on 7/14, s/p 3 dialysis sessions with last one on 7/17; catheter removed 7/22  - question of accuracy of urine output given often stooling and urinating simultaneously in commode     Tachyarrthymia  Paroxysmal afib  Paroxysmal aflutter/AT  SVT vs afib.Had an occurrence during HD on 7/14. Had afib with RVR to 200s on 7/17. EP consult placed.asmyptomatic and stable during episodes. Aflutter episode (7/17) with transfer. Vagal maneuver responsive at time. No recent tachyarrhythmia episodes (7/22).   - Per EP: patient has had episodes of possible flutter (vs AT with 2:1 conduction) and afib with RVR  - heparin drip and transition to DOAC after PEGJ placement (CHADS-VASc score of 2)  - On telemetry  - On metoprolol tartrate 25mg BID  - Discharge on ziopatch for 14 days with EP follow up in 1-2 months after     Stress-induced hyperglycemia  -200s over past day.   - on 15U glargine BID; continue today and re-evaluate as needed     Acute Blood Loss Anemia, stable  Initially from acute blood loss. Hb dropped to 6.8 on 7/14, requiring 1U pRBC. Post-transfusion Hb 9.4 > 8.3 > 8.6 > 8.7 (7/18).   - continue to monitor  - transfuse for hgb<7        Diet: Adult Formula  Drip Feeding: Continuous Novasource Renal; Nasojejunal; Goal Rate: 35; initiate at 15 ml/hr and advance by 10 mL Q8H to goal; mL/hr; Medication - Feeding Tube Flush Frequency: At least 15-30 mL water before and after medication admin...  NPO per Anesthesia Guidelines for Procedure/Surgery Except for: Meds, Ice Chips, NPO but receiving Tube Feeding    DVT Prophylaxis: heparin  Dong Catheter: Not present  Central Lines: None  Cardiac Monitoring: None  Code Status: Full Code      Disposition Plan        The patient's care was discussed with the Patient and Patient's Family.    Catalino Preciado DO  Hospitalist Service, GOLD TEAM 10  [facility]  Securely message with the Vocera Web Console (learn more here)  Text page via [employer] Paging/Directory   Please see signed in provider for up to date coverage information      Clinically Significant Risk Factors Present on Admission                      ______________________________________________________________________    Interval History   No overnight events, cramping abdominal pain this morning.     Data reviewed today: I reviewed all medications, new labs and imaging results over the last 24 hours. I personally reviewed no images or EKG's today.    Physical Exam   Vital Signs: Temp: 98.1  F (36.7  C) Temp src: Axillary BP: (!) 145/81 Pulse: 96   Resp: 14 SpO2: 100 % O2 Device: Oxymask Oxygen Delivery: 2 LPM  Weight: 156 lbs 4.8 oz  General: non-distressed adult  HEENT: Normocephalic, atraumatic, pupils equal round reactive, membranes moist. HD cath RIJ, NGT, NJT  CV: normal capillary refill, heart regular rate and rhythm, tele  Respiratory: Non-labored breathing, bronchovesicular lung sounds, R Chest Tube noted, draining serosangiunous.   Abdominal: soft, mildly tender in the epigastrium, no rebound, no guarding, no rigidity, +bowel sounds  Genitourinary: no suprapubic tenderness  Musculoskeletal: normal bulk and tone  Skin: no  rash, normal turgor  Neurologic: no facial droop, moving upper and lower extremities spontaneously, sensation in tact to light touch on extremities  Psychiatric: normal mood and affect    Data   Recent Labs   Lab 07/23/22  1549 07/23/22  1242 07/23/22  1059 07/22/22  0757 07/22/22  0729 07/21/22  1211 07/21/22  0955 07/20/22  0752 07/20/22  0648 07/18/22  0537 07/18/22  0532   WBC  --   --  7.8  --  8.6  --  9.2  --  8.5   < > 10.5   HGB  --   --  8.0*  --  7.8*  --  8.4*  --  9.1*   < > 8.7*   MCV  --   --  102*  --  103*  --  102*  --  100   < > 98   PLT  --   --  256  --  250  --  305  --  264   < > 186   NA  --   --  140  --  138  --  139   < > 135*   < > 131*   POTASSIUM  --   --  3.7  --  3.6  --  3.7   < > 3.6   < > 4.3   CHLORIDE  --   --  93*  --  93*  --  95*   < > 92*   < > 94*   CO2  --   --  40*  --  41*  --  32*   < > 31*   < > 31*   BUN  --   --  88.6*  --  92.6*  --  89.3*   < > 87.0*   < > 59.5*   CR  --   --  1.24*  --  1.58*  --  1.46*   < > 1.80*   < > 2.04*   ANIONGAP  --   --  7  --  4*  --  12   < > 12   < > 6*   ESTUARDO  --   --  9.3  --  9.0  --  8.6*   < > 8.5*   < > 8.6*   * 164* 151*   < > 164*   < > 164*   < > 101*   < > 202*   ALBUMIN  --   --   --   --   --   --  2.8*  --   --   --  2.8*   PROTTOTAL  --   --   --   --   --   --  4.9*  --  4.9*  --  4.7*   BILITOTAL  --   --   --   --   --   --  0.2  --   --   --  0.2   ALKPHOS  --   --   --   --   --   --  74  --   --   --  85   ALT  --   --   --   --   --   --  18  --   --   --  21   AST  --   --   --   --   --   --  17  --   --   --  19    < > = values in this interval not displayed.

## 2022-07-23 NOTE — PROGRESS NOTES
Cardiovascular Surgery Progress Note  07/23/2022         Assessment and Plan:     Sofie is a 62 F PMH of oxygen-dependent COPD, HFpEF, HTN, HCV, and osteopenia who underwent bilateral lung transplant on 6/28/22 with Dr. Sunshine. This was complicated by left upper extremity acute limb ischemia s/p left radial thrombectomy on 7/1/22. Recovering renal function, will hold off on tunneled dialysis line at this time.       - Right Pleural tube remains to suction, elevated output but improving, can ambulate off suction. Keep tube today 7/23.   - Removed left pleural tube 7/20, she may have significant drainage from the tube sites. Has small left pleural effusion, continue to monitor.  - Has bilateral apical pleural effusions, lungs did not fill chest space per surgeon.     Discussed with Dr Sunshine through both written and verbal communication.      Andres Wilson PA-C  Cardiothoracic Surgery  Pager [phone]    8:38 AM   July 23, 2022

## 2022-07-24 ENCOUNTER — APPOINTMENT (OUTPATIENT)
Dept: GENERAL RADIOLOGY | Facility: CLINIC | Age: 62
DRG: 007 | End: 2022-07-24
Attending: STUDENT IN AN ORGANIZED HEALTH CARE EDUCATION/TRAINING PROGRAM
Payer: MEDICARE

## 2022-07-24 ENCOUNTER — APPOINTMENT (OUTPATIENT)
Dept: PHYSICAL THERAPY | Facility: CLINIC | Age: 62
DRG: 007 | End: 2022-07-24
Attending: THORACIC SURGERY (CARDIOTHORACIC VASCULAR SURGERY)
Payer: MEDICARE

## 2022-07-24 LAB
ANION GAP SERPL CALCULATED.3IONS-SCNC: 4 MMOL/L (ref 7–15)
BASE EXCESS BLDV CALC-SCNC: 21.8 MMOL/L (ref -7.7–1.9)
BASOPHILS # BLD AUTO: 0 10E3/UL (ref 0–0.2)
BASOPHILS NFR BLD AUTO: 0 %
BUN SERPL-MCNC: 95.9 MG/DL (ref 8–23)
CALCIUM SERPL-MCNC: 9.7 MG/DL (ref 8.8–10.2)
CHLORIDE SERPL-SCNC: 93 MMOL/L (ref 98–107)
CREAT SERPL-MCNC: 1.32 MG/DL (ref 0.51–0.95)
DEPRECATED HCO3 PLAS-SCNC: 45 MMOL/L (ref 22–29)
EOSINOPHIL # BLD AUTO: 0.2 10E3/UL (ref 0–0.7)
EOSINOPHIL NFR BLD AUTO: 3 %
ERYTHROCYTE [DISTWIDTH] IN BLOOD BY AUTOMATED COUNT: 17 % (ref 10–15)
GFR SERPL CREATININE-BSD FRML MDRD: 46 ML/MIN/1.73M2
GLUCOSE BLDC GLUCOMTR-MCNC: 102 MG/DL (ref 70–99)
GLUCOSE BLDC GLUCOMTR-MCNC: 114 MG/DL (ref 70–99)
GLUCOSE BLDC GLUCOMTR-MCNC: 132 MG/DL (ref 70–99)
GLUCOSE BLDC GLUCOMTR-MCNC: 147 MG/DL (ref 70–99)
GLUCOSE BLDC GLUCOMTR-MCNC: 191 MG/DL (ref 70–99)
GLUCOSE SERPL-MCNC: 144 MG/DL (ref 70–99)
HCO3 BLDV-SCNC: 49 MMOL/L (ref 21–28)
HCT VFR BLD AUTO: 24.8 % (ref 35–47)
HGB BLD-MCNC: 7.3 G/DL (ref 11.7–15.7)
IMM GRANULOCYTES # BLD: 0 10E3/UL
IMM GRANULOCYTES NFR BLD: 1 %
LDH SERPL L TO P-CCNC: 274 U/L (ref 0–250)
LYMPHOCYTES # BLD AUTO: 0.2 10E3/UL (ref 0.8–5.3)
LYMPHOCYTES NFR BLD AUTO: 3 %
MAGNESIUM SERPL-MCNC: 2.5 MG/DL (ref 1.7–2.3)
MCH RBC QN AUTO: 31.1 PG (ref 26.5–33)
MCHC RBC AUTO-ENTMCNC: 29.4 G/DL (ref 31.5–36.5)
MCV RBC AUTO: 106 FL (ref 78–100)
MONOCYTES # BLD AUTO: 0.5 10E3/UL (ref 0–1.3)
MONOCYTES NFR BLD AUTO: 8 %
NEUTROPHILS # BLD AUTO: 4.9 10E3/UL (ref 1.6–8.3)
NEUTROPHILS NFR BLD AUTO: 85 %
NRBC # BLD AUTO: 0 10E3/UL
NRBC BLD AUTO-RTO: 0 /100
O2/TOTAL GAS SETTING VFR VENT: 30 %
PCO2 BLDV: 75 MM HG (ref 40–50)
PH BLDV: 7.42 [PH] (ref 7.32–7.43)
PHOSPHATE SERPL-MCNC: 4.1 MG/DL (ref 2.5–4.5)
PLATELET # BLD AUTO: 256 10E3/UL (ref 150–450)
PO2 BLDV: 47 MM HG (ref 25–47)
POTASSIUM SERPL-SCNC: 4.3 MMOL/L (ref 3.4–5.3)
PROT SERPL-MCNC: 4.6 G/DL (ref 6.4–8.3)
RBC # BLD AUTO: 2.35 10E6/UL (ref 3.8–5.2)
SODIUM SERPL-SCNC: 142 MMOL/L (ref 136–145)
UFH PPP CHRO-ACNC: 0.29 IU/ML
WBC # BLD AUTO: 5.8 10E3/UL (ref 4–11)

## 2022-07-24 PROCEDURE — 94640 AIRWAY INHALATION TREATMENT: CPT | Mod: 76

## 2022-07-24 PROCEDURE — 36415 COLL VENOUS BLD VENIPUNCTURE: CPT | Performed by: STUDENT IN AN ORGANIZED HEALTH CARE EDUCATION/TRAINING PROGRAM

## 2022-07-24 PROCEDURE — 71045 X-RAY EXAM CHEST 1 VIEW: CPT

## 2022-07-24 PROCEDURE — 250N000013 HC RX MED GY IP 250 OP 250 PS 637: Performed by: STUDENT IN AN ORGANIZED HEALTH CARE EDUCATION/TRAINING PROGRAM

## 2022-07-24 PROCEDURE — 999N000157 HC STATISTIC RCP TIME EA 10 MIN

## 2022-07-24 PROCEDURE — 99233 SBSQ HOSP IP/OBS HIGH 50: CPT | Performed by: STUDENT IN AN ORGANIZED HEALTH CARE EDUCATION/TRAINING PROGRAM

## 2022-07-24 PROCEDURE — 120N000002 HC R&B MED SURG/OB UMMC

## 2022-07-24 PROCEDURE — 84100 ASSAY OF PHOSPHORUS: CPT | Performed by: STUDENT IN AN ORGANIZED HEALTH CARE EDUCATION/TRAINING PROGRAM

## 2022-07-24 PROCEDURE — 250N000013 HC RX MED GY IP 250 OP 250 PS 637: Performed by: NURSE PRACTITIONER

## 2022-07-24 PROCEDURE — 83735 ASSAY OF MAGNESIUM: CPT | Performed by: STUDENT IN AN ORGANIZED HEALTH CARE EDUCATION/TRAINING PROGRAM

## 2022-07-24 PROCEDURE — 71045 X-RAY EXAM CHEST 1 VIEW: CPT | Mod: 26 | Performed by: RADIOLOGY

## 2022-07-24 PROCEDURE — 250N000013 HC RX MED GY IP 250 OP 250 PS 637: Performed by: THORACIC SURGERY (CARDIOTHORACIC VASCULAR SURGERY)

## 2022-07-24 PROCEDURE — 250N000012 HC RX MED GY IP 250 OP 636 PS 637: Performed by: NURSE PRACTITIONER

## 2022-07-24 PROCEDURE — 85025 COMPLETE CBC W/AUTO DIFF WBC: CPT | Performed by: STUDENT IN AN ORGANIZED HEALTH CARE EDUCATION/TRAINING PROGRAM

## 2022-07-24 PROCEDURE — 250N000011 HC RX IP 250 OP 636: Performed by: SURGERY

## 2022-07-24 PROCEDURE — 83615 LACTATE (LD) (LDH) ENZYME: CPT | Performed by: INTERNAL MEDICINE

## 2022-07-24 PROCEDURE — 94640 AIRWAY INHALATION TREATMENT: CPT

## 2022-07-24 PROCEDURE — 99233 SBSQ HOSP IP/OBS HIGH 50: CPT | Mod: 24 | Performed by: PHYSICIAN ASSISTANT

## 2022-07-24 PROCEDURE — 250N000012 HC RX MED GY IP 250 OP 636 PS 637: Performed by: PHYSICIAN ASSISTANT

## 2022-07-24 PROCEDURE — 94642 AEROSOL INHALATION TREATMENT: CPT

## 2022-07-24 PROCEDURE — 84155 ASSAY OF PROTEIN SERUM: CPT | Performed by: INTERNAL MEDICINE

## 2022-07-24 PROCEDURE — 250N000013 HC RX MED GY IP 250 OP 250 PS 637: Performed by: SURGERY

## 2022-07-24 PROCEDURE — 250N000011 HC RX IP 250 OP 636: Performed by: STUDENT IN AN ORGANIZED HEALTH CARE EDUCATION/TRAINING PROGRAM

## 2022-07-24 PROCEDURE — 94660 CPAP INITIATION&MGMT: CPT

## 2022-07-24 PROCEDURE — 250N000011 HC RX IP 250 OP 636

## 2022-07-24 PROCEDURE — 97116 GAIT TRAINING THERAPY: CPT | Mod: GP | Performed by: PHYSICAL THERAPIST

## 2022-07-24 PROCEDURE — 250N000013 HC RX MED GY IP 250 OP 250 PS 637

## 2022-07-24 PROCEDURE — 94668 MNPJ CHEST WALL SBSQ: CPT

## 2022-07-24 PROCEDURE — 85520 HEPARIN ASSAY: CPT | Performed by: STUDENT IN AN ORGANIZED HEALTH CARE EDUCATION/TRAINING PROGRAM

## 2022-07-24 PROCEDURE — 82803 BLOOD GASES ANY COMBINATION: CPT | Performed by: STUDENT IN AN ORGANIZED HEALTH CARE EDUCATION/TRAINING PROGRAM

## 2022-07-24 PROCEDURE — 80048 BASIC METABOLIC PNL TOTAL CA: CPT | Performed by: STUDENT IN AN ORGANIZED HEALTH CARE EDUCATION/TRAINING PROGRAM

## 2022-07-24 PROCEDURE — 97530 THERAPEUTIC ACTIVITIES: CPT | Mod: GP | Performed by: PHYSICAL THERAPIST

## 2022-07-24 PROCEDURE — 250N000009 HC RX 250: Performed by: SURGERY

## 2022-07-24 RX ADMIN — TACROLIMUS 5 MG: 5 CAPSULE ORAL at 08:28

## 2022-07-24 RX ADMIN — HEPARIN SODIUM 900 UNITS/HR: 10000 INJECTION, SOLUTION INTRAVENOUS at 09:07

## 2022-07-24 RX ADMIN — VANCOMYCIN HYDROCHLORIDE 125 MG: KIT at 08:28

## 2022-07-24 RX ADMIN — Medication 5 MG: at 13:21

## 2022-07-24 RX ADMIN — ACETAMINOPHEN 650 MG: 325 TABLET, FILM COATED ORAL at 01:53

## 2022-07-24 RX ADMIN — PREDNISONE 15 MG: 5 TABLET ORAL at 08:26

## 2022-07-24 RX ADMIN — Medication 1 CAPSULE: at 21:29

## 2022-07-24 RX ADMIN — METOPROLOL TARTRATE 25 MG: 25 TABLET, FILM COATED ORAL at 20:20

## 2022-07-24 RX ADMIN — CALCIUM CARBONATE 600 MG (1,500 MG)-VITAMIN D3 400 UNIT TABLET 1 TABLET: at 10:23

## 2022-07-24 RX ADMIN — HYDROXYZINE HYDROCHLORIDE 50 MG: 25 TABLET ORAL at 01:54

## 2022-07-24 RX ADMIN — Medication 40 MG: at 10:23

## 2022-07-24 RX ADMIN — THIAMINE HCL TAB 100 MG 100 MG: 100 TAB at 10:23

## 2022-07-24 RX ADMIN — ASPIRIN 81 MG CHEWABLE TABLET 81 MG: 81 TABLET CHEWABLE at 10:23

## 2022-07-24 RX ADMIN — Medication 40 MG: at 20:21

## 2022-07-24 RX ADMIN — VALGANCICLOVIR HYDROCHLORIDE 450 MG: 50 POWDER, FOR SOLUTION ORAL at 08:31

## 2022-07-24 RX ADMIN — VANCOMYCIN HYDROCHLORIDE 125 MG: KIT at 01:54

## 2022-07-24 RX ADMIN — INSULIN ASPART 3 UNITS: 100 INJECTION, SOLUTION INTRAVENOUS; SUBCUTANEOUS at 04:34

## 2022-07-24 RX ADMIN — INSULIN ASPART 1 UNITS: 100 INJECTION, SOLUTION INTRAVENOUS; SUBCUTANEOUS at 08:30

## 2022-07-24 RX ADMIN — NYSTATIN: 100000 CREAM TOPICAL at 20:47

## 2022-07-24 RX ADMIN — ONDANSETRON 4 MG: 4 TABLET, ORALLY DISINTEGRATING ORAL at 08:25

## 2022-07-24 RX ADMIN — TACROLIMUS 5 MG: 5 CAPSULE ORAL at 17:20

## 2022-07-24 RX ADMIN — METOPROLOL TARTRATE 25 MG: 25 TABLET, FILM COATED ORAL at 08:27

## 2022-07-24 RX ADMIN — CALCIUM CARBONATE 600 MG (1,500 MG)-VITAMIN D3 400 UNIT TABLET 1 TABLET: at 17:18

## 2022-07-24 RX ADMIN — ACETAMINOPHEN 650 MG: 325 TABLET, FILM COATED ORAL at 16:34

## 2022-07-24 RX ADMIN — LEVALBUTEROL HYDROCHLORIDE 1.25 MG: 1.25 SOLUTION RESPIRATORY (INHALATION) at 07:56

## 2022-07-24 RX ADMIN — NYSTATIN 1000000 UNITS: 100000 SUSPENSION ORAL at 17:18

## 2022-07-24 RX ADMIN — ACETYLCYSTEINE 2 ML: 200 SOLUTION ORAL; RESPIRATORY (INHALATION) at 07:56

## 2022-07-24 RX ADMIN — MYCOPHENOLIC ACID 720 MG: 360 TABLET, DELAYED RELEASE ORAL at 20:18

## 2022-07-24 RX ADMIN — ACETAMINOPHEN 975 MG: 325 TABLET, FILM COATED ORAL at 08:25

## 2022-07-24 RX ADMIN — ACETYLCYSTEINE 2 ML: 200 SOLUTION ORAL; RESPIRATORY (INHALATION) at 20:33

## 2022-07-24 RX ADMIN — PREDNISONE 12.5 MG: 2.5 TABLET ORAL at 20:20

## 2022-07-24 RX ADMIN — INSULIN GLARGINE 15 UNITS: 100 INJECTION, SOLUTION SUBCUTANEOUS at 08:29

## 2022-07-24 RX ADMIN — ONDANSETRON 4 MG: 4 TABLET, ORALLY DISINTEGRATING ORAL at 20:46

## 2022-07-24 RX ADMIN — Medication 5 MG: at 01:53

## 2022-07-24 RX ADMIN — Medication 1 CAPSULE: at 10:23

## 2022-07-24 RX ADMIN — LEVALBUTEROL HYDROCHLORIDE 1.25 MG: 1.25 SOLUTION RESPIRATORY (INHALATION) at 11:50

## 2022-07-24 RX ADMIN — Medication 2.5 MG: at 17:27

## 2022-07-24 RX ADMIN — ACETYLCYSTEINE 2 ML: 200 SOLUTION ORAL; RESPIRATORY (INHALATION) at 11:50

## 2022-07-24 RX ADMIN — NYSTATIN 1000000 UNITS: 100000 SUSPENSION ORAL at 08:26

## 2022-07-24 RX ADMIN — VANCOMYCIN HYDROCHLORIDE 125 MG: KIT at 20:17

## 2022-07-24 RX ADMIN — NYSTATIN 1000000 UNITS: 100000 SUSPENSION ORAL at 21:29

## 2022-07-24 RX ADMIN — LEVALBUTEROL HYDROCHLORIDE 1.25 MG: 1.25 SOLUTION RESPIRATORY (INHALATION) at 20:33

## 2022-07-24 RX ADMIN — THERA TABS 1 TABLET: TAB at 11:48

## 2022-07-24 RX ADMIN — SIMETHICONE 40 MG: 20 SUSPENSION/ DROPS ORAL at 17:46

## 2022-07-24 RX ADMIN — MYCOPHENOLIC ACID 720 MG: 360 TABLET, DELAYED RELEASE ORAL at 10:22

## 2022-07-24 RX ADMIN — INSULIN GLARGINE 15 UNITS: 100 INJECTION, SOLUTION SUBCUTANEOUS at 20:49

## 2022-07-24 RX ADMIN — VANCOMYCIN HYDROCHLORIDE 125 MG: KIT at 14:38

## 2022-07-24 RX ADMIN — NYSTATIN 1000000 UNITS: 100000 SUSPENSION ORAL at 13:21

## 2022-07-24 RX ADMIN — NYSTATIN: 100000 CREAM TOPICAL at 08:29

## 2022-07-24 RX ADMIN — ACETAMINOPHEN 975 MG: 325 TABLET, FILM COATED ORAL at 20:19

## 2022-07-24 ASSESSMENT — ACTIVITIES OF DAILY LIVING (ADL)
ADLS_ACUITY_SCORE: 30
ADLS_ACUITY_SCORE: 32
ADLS_ACUITY_SCORE: 30

## 2022-07-24 NOTE — PLAN OF CARE
Shift summary:    Patient with some abdominal cramping and pain this afternoon. Simethicone/tylenol/oxycodone given with some relief. Ambulated in the reilly and out of bed to the chair for 2 hours. Aware that likely with need chest tube placement tomorrow. No acute events during the shift.

## 2022-07-24 NOTE — PROGRESS NOTES
Cardiovascular Surgery Progress Note  07/24/2022         Assessment and Plan:     Sofie is a 62 F PMH of oxygen-dependent COPD, HFpEF, HTN, HCV, and osteopenia who underwent bilateral lung transplant on 6/28/22 with Dr. Sunshine. This was complicated by left upper extremity acute limb ischemia s/p left radial thrombectomy on 7/1/22. Recovering renal function, will hold off on tunneled dialysis line at this time.       - Right Pleural tube remains to suction, elevated output but improving, can ambulate off suction. Keep tube today 7/24.   - Removed left pleural tube 7/20, she may have significant drainage from the tube sites. Has increasing loculated left pleural effusions, consider IR pigtail placement in lateral loculation.   - Has bilateral apical pleural effusions, lungs did not fill chest space per surgeon.     Discussed with Dr Sunshine through both written and verbal communication.      Andres Wilson PA-C   Cardiothoracic Surgery  Pager [phone]    8:38 AM   July 23, 2022

## 2022-07-24 NOTE — PROGRESS NOTES
[facility]    Medicine Progress Note - Hospitalist Service, GOLD TEAM 10    Date of Admission:  6/28/2022    Assessment & Plan  Sofie Rodriguez is a 62 year old female admitted on 6/28/2022. She has PMH of end stage COPD s/p b/l lung transplant on 6/28/2022, HTN, HFpEF, h/o hepC, oteopenia, and former methamphetamine use, and she was transferred to [place] [address] [facility] from MICU on 7/15/2022. She was admitted to the MICU on 7/13/20222 with prior hospital course complicated by left upper extremity acute limb ischemia s/p left radial thrombectomy on 7/1/2022. She was primarily treated for acute hypercapnic respiratory failure on intermittent BIPAP with worsening BACILIO while in the MICU. Breathing is improving, but patient has severely delayed gastric emptying found on NM study. PEGJ placement ordered, pending supply chain issue resolution.       TODAY  - Goal to get PEGJ but limitation to PEGJ supplies may cause delay  - NPO, NG to [facility], [place] for TFs  - Switch heparin to DOAC once PEGJ placed and chest tubes de-escalated  - Continue to encourage OOB and up in chair  - BIPAP at night  - IR consulted for pigtail to Left Apical fluid collection -> will send for studies  - Ongoing Simethicone for Gas / Cramping and Vancomycin for CDiff -> anticipate extended course given IS     End stage COPD s/p BOLT 6/28/2022  Acute hypercapnic hypoxic respiratory failure (improving), likely 2/2 decreased central respiratory drive vs respiratory muscle weakness and some pulmonary edema / fluid Transplanted 6/28. formal SNIFF test was performed on 7/14 showed R hemidiaphragm palsy. Of note transplanted lungs were also considered small for body size and could contribute to decreased respiratory compensation for hypercarbia. VBG relatively stable, most recent pCO2 74 (7/22).   - Continue BIPAP at night   - Simple mask w intermittent BIPAP for night and daytime naps; goal to keep O2sat  above 92%  - f/u myasthenia gravis panel  (pending from 7/14)  - oxycodone 2.5-5mg q4h PRN   - encourage activity and getting up in bed; PT/OT participation  - encourage chest physiotherapy QID    Immunosuppression:  - Prednisone 15mg AM, 12.5 PM  - Tacrolimus 5mg BID (trough goal 8-12)   -  BID  Prophylasxis:  - CMV - Valgancyclover  - Thrush - PO nysatin  - PJP - TMP-SMX (currently held given BACILIO); dapsone started 7/18 at 50mg qMWF (will try to increase to daily on 7/25 per pulm)     Pleural Effusion, Right and Left  - 1 R chest tube (basilar) in place currently (pericardial drain was removed 7/15, L basilar was removed 7/20). CT chest 7/14 showing unchanged bilateral effusions and unchanged biapical pneumothoraces with resolved left basilar pneumothorax; bibasilar chest tubes in place with pericardial drain (which was removed 7/15). R tube still with ~200mL output over past day (7/22).   - daily CXR  - Continue R Chest tube for now  - IR consulted for L apical fluid collection pigtail placement, would need heparin held     Hypotension, resolved  H/o HTN  H/o HFpEF  Likely vasoplegia post operatively. Last echo 7/7 normal EF with good LV function. S/p hydrocortisone 7/6-7/9 and fludrocortisone 7/6-7/11 without significant improvement.  - off midorine 7/19  - Holding PTA lasix 20mg daily     C.diff  Abdominal pain   Severe Gastroparesis  Worsening diarrhea via rectal pouch with new onset abd pain that is significantly worse as of 7/15. C. Diff positive on 7/11 with vanc started on 7/12. CT abd 7/15 showed no signs of toxic megacolon, but showed very distended stomach; NG was placed 7/15 for decompression. Patient is symptomatically improved while on intermittent suction.  - PO vanc 125mg QID (7/12-7/26)  - gastric emptying study 7/20 showed delayed gastric emptying with retention of 95% of stomach contents after 4 hours; please keep patient NPO except tube feeds and meds  - PEGJ ordered, placement is pending  supply  - Simethicone scheduled     [place] tube  Feeding regimen:   - Adult Formula Drip Feeding: Continuous Novasource Renal; Nasojejunal; Goal Rate: 35; initiate at goal rate; mL/hr; Medication - Feeding Tube Flush Frequency: At least 15-30 mL water before and after medication administration and with tube clogging     BACILIO  Hypermagnesemia  Hyperphosphatemia  Baseline renal function was normal prior to surgery. New onset renal failure after transplant, likely multifactorial due to pre-renal hypotension, less likely nephrotoxic agents. Overall kidney function improving. Today, Cr fluctuating but BUN increasing, with unclear urine output (7/22).   - HD cath removed 7/22  - R internal jugular non-tunneled cathether placed with HD line on 7/14, s/p 3 dialysis sessions with last one on 7/17; catheter removed 7/22     Tachyarrthymia  Paroxysmal afib  Paroxysmal aflutter/AT  SVT vs afib.Had an occurrence during HD on 7/14. Had afib with RVR to 200s on 7/17. EP consult placed.asmyptomatic and stable during episodes. Aflutter episode (7/17) with transfer. Vagal maneuver responsive at time. No recent tachyarrhythmia episodes (7/22).   - Per EP: patient has had episodes of possible flutter (vs AT with 2:1 conduction) and afib with RVR  - heparin drip and transition to DOAC after PEGJ placement and chest tubes resolved (CHADS-VASc score of 2)  - On telemetry  - On metoprolol tartrate 25mg BID  - Discharge on ziopatch for 14 days with EP follow up in 1-2 months after     Stress-induced hyperglycemia  -200s over past day.   - on 15U glargine BID; continue today and re-evaluate as needed     Acute Blood Loss Anemia, stable  Initially from acute blood loss. Hb dropped to 6.8 on 7/14, requiring 1U pRBC. Post-transfusion Hb 9.4 > 8.3 > 8.6 > 8.7 (7/18).   - continue to monitor  - transfuse for hgb<7        Diet: Adult Formula Drip Feeding: Continuous Novasource Renal; Nasojejunal; Goal Rate: 35; initiate at 15 ml/hr and advance by  10 mL Q8H to goal; mL/hr; Medication - Feeding Tube Flush Frequency: At least 15-30 mL water before and after medication admin...  NPO per Anesthesia Guidelines for Procedure/Surgery Except for: Meds, Ice Chips, NPO but receiving Tube Feeding    DVT Prophylaxis: heparin  Dong Catheter: Not present  Central Lines: None  Cardiac Monitoring: None  Code Status: Full Code      Disposition Plan        The patient's care was discussed with the Patient and Patient's Family.    Catalino Preciado DO  Hospitalist Service, GOLD TEAM [address] [place] [address]  Securely message with the Vocera Web Console (learn more here)  Text page via [facility] Paging/Directory   Please see signed in provider for up to date coverage information      Clinically Significant Risk Factors Present on Admission                      ______________________________________________________________________    Interval History   Doing well generally without complaint, no overnight events, faint abdominal pain in the flanks, breathing stable on a few liters NC, no overnight events.    A 4 point review of systems evaluated including questioning patient about cardiovascular symptoms, gastrointestinal symptoms, respiratory symptoms, constitutional symptoms, and hematology / bleeding symptoms and all were negative except as noted in HPI above.    Data reviewed today: I reviewed all medications, new labs and imaging results over the last 24 hours. I personally reviewed no images or EKG's today.    Physical Exam   Vital Signs: Temp: 98.3  F (36.8  C) Temp src: Axillary BP: 122/69 Pulse: 101   Resp: 12 SpO2: 100 % O2 Device: Oxymask Oxygen Delivery: 1 LPM  Weight: 156 lbs 4.8 oz  General: non-distressed adult  HEENT: Normocephalic, atraumatic, pupils equal round reactive, membranes moist. HD cath RIJ, NGT, NJT  CV: normal capillary refill, heart regular rate and rhythm, tele  Respiratory: Non-labored breathing, bronchovesicular lung  sounds, R Chest Tube noted, draining serosangiunous.   Abdominal: soft, mildly tender in the epigastrium, no rebound, no guarding, no rigidity, +bowel sounds  Genitourinary: no suprapubic tenderness  Musculoskeletal: normal bulk and tone  Skin: no rash, normal turgor  Neurologic: no facial droop, moving upper and lower extremities spontaneously, sensation in tact to light touch on extremities  Psychiatric: normal mood and affect    Data   Recent Labs   Lab 07/24/22  1147 07/24/22  0817 07/24/22  0754 07/23/22  1242 07/23/22  1059 07/22/22  0757 07/22/22  0729 07/21/22  1211 07/21/22  0955 07/20/22  0752 07/20/22  0648 07/18/22  0537 07/18/22  0532   WBC  --   --  5.8  --  7.8  --  8.6  --  9.2  --  8.5   < > 10.5   HGB  --   --  7.3*  --  8.0*  --  7.8*  --  8.4*  --  9.1*   < > 8.7*   MCV  --   --  106*  --  102*  --  103*  --  102*  --  100   < > 98   PLT  --   --  256  --  256  --  250  --  305  --  264   < > 186   NA  --   --  142  --  140  --  138  --  139   < > 135*   < > 131*   POTASSIUM  --   --  4.3  --  3.7  --  3.6  --  3.7   < > 3.6   < > 4.3   CHLORIDE  --   --  93*  --  93*  --  93*  --  95*   < > 92*   < > 94*   CO2  --   --  45*  --  40*  --  41*  --  32*   < > 31*   < > 31*   BUN  --   --  95.9*  --  88.6*  --  92.6*  --  89.3*   < > 87.0*   < > 59.5*   CR  --   --  1.32*  --  1.24*  --  1.58*  --  1.46*   < > 1.80*   < > 2.04*   ANIONGAP  --   --  4*  --  7  --  4*  --  12   < > 12   < > 6*   ESTUARDO  --   --  9.7  --  9.3  --  9.0  --  8.6*   < > 8.5*   < > 8.6*   * 147* 144*   < > 151*   < > 164*   < > 164*   < > 101*   < > 202*   ALBUMIN  --   --   --   --   --   --   --   --  2.8*  --   --   --  2.8*   PROTTOTAL  --   --   --   --   --   --   --   --  4.9*  --  4.9*  --  4.7*   BILITOTAL  --   --   --   --   --   --   --   --  0.2  --   --   --  0.2   ALKPHOS  --   --   --   --   --   --   --   --  74  --   --   --  85   ALT  --   --   --   --   --   --   --   --  18  --   --   --  21   AST   --   --   --   --   --   --   --   --  17  --   --   --  19    < > = values in this interval not displayed.

## 2022-07-24 NOTE — PROGRESS NOTES
End of shift summary:    No acute changes overnight.  Rested comfortably on BiPAP 15/5 30% overnight, tolerated well.  2L Oxymask during the day.  Right chest tube output 90cc serosanguinous.  SR in the 70-80s.  Tolerated TF @ goal per [place], NG to LIS with clamping just for meds.  Output per NG 450cc.  Up to the commode with standby/line management.  Mild abdominal cramping well relieved with simethicone, tylenol, and 5 of oxy.  Heparin gtt remains therapeutic.     Plan: Notify primary team with any changes.

## 2022-07-24 NOTE — PROGRESS NOTES
Pulmonary Medicine  Cystic Fibrosis - Lung Transplant Team  Progress Note  2022       Patient: Sofie Rodriguez  MRN: [id number]  : 1960 (age 62 year old)  Transplant: 2022 (Lung), POD#26  Admission date: 2022    Assessment & Plan:     Sofie Rodriguez is a 62 year old female with a PMH significant for end-stage COPD, HTN, HFpEF, Mycobacterium peregrinum colonization, h/o hepatitis C, HECTOR s/p LEEP procedure, osteopenia, and former methamphetamine use.  Pt. is now s/p BSLT on 22, lungs slightly undersized.   Persistent low dose pressor needs post-op through 7/10.  Extubated POD #2 but with persistent hypercapnia, mostly compensated and only slightly improved with intermittent NIPPV.  Also with left radial artery thrombus (presumed secondary to arterial line) s/p thrombectomy , BACILIO, and C diff.  Rehabilitation and airway clearance limited by dysrhythmia and gastric discomfort.     Today's recommendations:  - DSA, EBV, IgG, and donor risk labs ordered   - Wean supplemental oxygen as able to maintain SpO2 >92%, continue BiPAP overnight  - Following pleural cultures, right chest tube to remain given output  - CXR daily as below  - IR consult for evaluation of chest tube to left apical fluid collection, obtain pleural cultures/studies if placed  - Tacrolimus level ordered tomorrow  - Prednisone taper due  (not yet ordered)  - Increase dapsone to daily after one week () if hgb remains stable  - Transition to DOAC after PEG/J placement  - Continue with NG to LIS (except for when taking PO medications), IR to place PEG/J tube (delayed by supply chain issue, timeline TBD)  - PO vancomycin per primary team     S/p bilateral sequential lung transplant (BSLT) for end stage COPD:  Persistent hypercapneic respiratory failure:   Persistent hypotension, Resolved:   Bilateral hydroPTX:  Right hemidiaphragm palsy: Explant pathology with severe emphysema with subpleural bullae formation,  changes of chronic bronchitis, subpleural scars and patchy pulmonary edema; benign hilar lymph nodes.  No evidence of PGD post-op.  Extubated 6/30.  Persistent hypercapnia without dyspnea, appears to be a respiratory drive problem.  Some improvement with BiPAP, limited tolerance in part due to anxiety.  Persistent low dose pressors weaned off 7/10, on scheduled midodrine (also s/p hydrocortisone 7/6-7/9 and fludrocortisone 7/6-7/11).  SNIFF (7/14) notable for right hemidiaphragm palsy.  Chest CT (7/18) with bilateral biapical effusions R>L and improved LLL atelectasis, also with LLL hydroPTX and bilateral PTX; bilateral chest tubes not communicating well with loculated effusion areas.  Bronch (7/19) with slight graying of mucosa noted at right anastomosis and scant secretions (slightly increased in RLL).  Left chest tube removed 7/20.  Chest CT (7/23) with increased loculated fluid within the left hemithorax with specks of air density in the loculated fluid, similar appearing loculated right hydroPTX with minimal decrease in fluid component (right chest tube appears to be outside the loculated hydroPTX), increased size of pleural based lesion in MARLON, and mild subcutaneous emphysema along right chest tube with minimal along tract of removed left chest tube.  On RA-3L during the day with BiPAP overnight (ongoing hypercapnia).    - DSA one month post-transplant (7/28, ordered)  - Ammonia monitoring twice weekly (screening for hyperammonemia post-lung transplant)  - Nebs: levalbuterol and Mucomyst QID   - Pulmonary toilet with chest physiotherapy QID, encourage consistent participation d/t concern for ineffective airway clearance  - Aerobika and incentive spirometry hourly while awake  - Supplemental oxygen as needed to maintain SpO2 >92% (wean as able), BiPAP overnight/with naps given persistent hypercapnia  - Chest tubes managed by surgical team, right tube remains with moderate output  - CXR daily, today with stable  bilateral pleural effusions with loculation in left lateral aspect (personally reviewed)  - IR consult for evaluation of chest tube to left apical fluid collection, obtain pleural cultures/studies if placed  - Volume management per primary team  - Encourage increased activity including up in chair (minimal day time in bed) and active participation in PT/OT given concern for deconditioning and ineffective airway clearance     Immunosuppression:  Induction therapy with basiliximab (and high dose IV steroid) given intraoperatively, repeating basiliximab dose on POD#4.  - Tacrolimus 5 mg BID (via NJ tube, held 7/11-7/12 for supratherapeutic level, peak level appropriate 7/11).  Goal level 8-12.  Repeat level 7/25 (orderd).  - MMF 1000 mg BID (decreased 7/10 due to diarrhea) --> adjust to Myfortic 720 mg BID given persistent GI symptoms (ordered, Pt. tolerating some pills by mouth)  - Prednisone 15 mg qAM / 12.5 mg qPM, next taper due 7/28 (not yet ordered)  Date AM dose (mg) PM dose (mg)   7/21/22 15 12.5   7/28/22 12.5 12.5   8/4/22 12.5 10   8/18/22 10 10   9/15/22 10 7.5   10/13/22 7.5 7.5   11/10/22 7.5 5   12/8/22 5 5   1/5/23 5 2.5      Prophylaxis:   - Dapsone for PJP ppx (7/18), increase to daily after one week (7/25) if hgb remains stable able (Bactrim held 7/7 for BACILIO)  - VGCV for CMV ppx  - Nystatin for oral candidiasis ppx, 6 month course  - See below for serologies and viral ppx:    Donor Recipient Intervention   CMV status Positive Positive Valganciclovir POD #8-90   EBV status Positive Positive EBV check monthly (7/28, ordered)   HSV status N/A Positive Not indicated      ID:  H/o M. peregrinum colonization: Donor bronch cultures (OSH) with Strep beta hemolytic (not group A).  Recipient cultures as below.  Bronch cultures (7/12) NGTD.  - IgG at one month (7/28, ordered)  - AFB bronch culture (6/28, 6/29, 7/12) NGTD, AFB to be sent on all future bronchs  - Right pleural cultures (7/20) NGTD,  follow     Streptococcus pneumoniae:  Stenotrophomonas maltophilia: Noted in recipient cultures at time of transplant.  S/p ceftazidime 6/28-7/10, vancomycin 7/7-7/8 and 6/28-6/30, and levofloxacin 7/10-7/12 for total 2 week ABX course.     H/o hepatitis C: Diagnosed in 1980s, 2 mos of treatment, quant negative since 10/2017, last positive 2/20/17 ([phone]).  Glenis positive on 6/2021 with negative HCV PCR.  H/o remote EtOH abuse.  MR elastography (4/27/21) with hepatology review and consult without any concerns post transplant.  Hepatitis C RNA negative and hepatitis C antibody positive (old) on 6/28.     PHS risk criteria donor:  Additional labs required post-transplant (between 4-8 weeks post-op): Hepatitis B, Hepatitis C, and HIV by SHERI ([id number], ordered 7/28).     Other relevant problems managed by primary team:      SVT:   Aflutter with RVR: SVT first noted on 7/14, prior to HD line placement.  Continues intermittently.  Aflutter with RVR to 200 7/17 triggered by activity.  EP consulted 7/17 given persistent tachycardia/dysrhythmia.  Midodrine held 7/19.  - Metoprolol per primary team (started 7/15)  - Heparin gtt (7/17) and then transition to DOAC after PEG/J placement     Left hand ischemia: Radial artery thrombosis identified on duplex doppler.  S/p thrombectomy on 7/2.  Completed high intensity heparin course.  Continue daily aspirin.      BACILIO:   Hyperkalemia: Likely multifactorial including medications (Bactrim, tacrolimus) and hypotension.  Fludrocortisone 7/6-7/11.  Potassium now stable.  S/p non-tunneled HD line 7/14.  Initial iHD run 7/14 limited by afib with RVR vs SVT.  Last iHD run 7/16, line removed 7/22.  - Tacrolimus monitoring as above  - Management per nephrology and primary team     Abdominal pain: Noted 7/15, cramping pain.  ACR with large stool burden in colon, no obstruction or distention.  Some nausea but no emesis.  CT abdomen (7/15) with moderate to large gastric distention, otherwise  without obstruction.  NG placed for LIS with moderate to large output for several days.  Increased abdominal pain 7/17 after clamping for 4 hours, tolerated clamping today without issue.  Gastric emptying study (7/20) with severe gastric emptying delay (95% retention at 4 hours).   - Simethicone prn  - Continue with TF via NJ and NG to LIS (except for when taking PO medications), IR to place PEG/J tube (delayed by supply chain issue, timeline TBD)  - Additional management per primary     C diff infection: Abdominal pain with diarrhea noted 7/8, AXR without dilated bowel, moderate colonic stool burden.  C diff positive 7/11.  Loose stools (on tube feeds) stable.  - PO vancomycin (7/11) per primary team     Hypomagnesemia: Suppressed absorption d/t CNI.   - Continue daily magnesium with replacement protocol prn, schedule oral magnesium supplementation if needed pending improvement in stools    We appreciate the excellent care provided by the [place] [id number] team.  Recommendations communicated via in person rounding and this note.  Will continue to follow along closely, please do not hesitate to call with any questions or concerns.    Patient discussed with Dr. Castillo.    Yuliet Aviles PA-C  [facility]  Pulmonary CF/Transplant     Subjective & Interval History:     Remains on BiPAP overnight with 2L via oxymask this morning (SpO2 100%).  SpO2 noted to be 87-89% on RA (within a couple minutes), placed back on 1L.  Minimal cough/no sputum, received chest physiotherapy once yesterday.  Right chest tube without 340 ml yesterday.  TF at goal via NJ.  NG to LIS, clamping with PO medications (tolerating well).  Some nausea yesterday, better today.  Abdominal cramping/pain also better today.  Stool frequency decreasing.      Review of Systems:     C: + low grade temp, no chills, + decreased weight  INTEGUMENTARY/SKIN: No rash or obvious new lesions  ENT/MOUTH: + sore throat, no sinus pain, no nasal congestion or  "drainage  RESP: See interval history  CV: No chest pain, + peripheral edema  GI: No vomiting  : No dysuria  MUSCULOSKELETAL: + incisional pain  ENDOCRINE: Blood sugars with improving control  NEURO: No headache, no numbness or tingling  PSYCHIATRIC: Mood stable    Physical Exam:     All notes, images, and labs from past 24 hours (at minimum) were reviewed.    Vital signs:  Temp: 98.3  F (36.8  C) Temp src: Axillary BP: 122/69 Pulse: 101   Resp: 12 SpO2: 100 % O2 Device: Oxymask Oxygen Delivery: 1 LPM Height: 157.5 cm (5' 2\") Weight: 70.9 kg (156 lb 4.8 oz)  I/O:     Intake/Output Summary (Last 24 hours) at 7/24/2022 1550  Last data filed at 7/24/2022 1200  Gross per 24 hour   Intake 1232 ml   Output 1330 ml   Net -98 ml     Constitutional: Lying in bed, in no apparent distress.   HEENT: Eyes with pink conjunctivae, anicteric.  Oral mucosa moist without lesions.  NJ and NG tubes.  PULM: Diminished air flow t/o left and to RLL.  No crackles, no rhonchi, no wheezes.  Non-labored breathing on 2L.  CV: Normal S1 and S2.  RRR.  No murmur, gallop, or rub.  Trace BLE edema.   ABD: NABS, softly distended, mildly tender t/o.  MSK: Moves all extremities.  + muscle wasting.   NEURO: Alert, conversant.   SKIN: Warm, dry.  No rash on limited exam.  Clamshell incision not visualized.   PSYCH: Mood stable.     Lines, Drains, and Devices:  Peripheral IV 07/20/22 Anterior;Right Lower forearm (Active)   Site Assessment WDL 07/24/22 1150   Line Status Infusing 07/24/22 1150   Dressing Intervention New dressing  07/20/22 1548   Phlebitis Scale 0-->no symptoms 07/24/22 1150   Infiltration Scale 0 07/24/22 1150   Number of days: 4     Data:     LABS    CMP:   Recent Labs   Lab 07/24/22  1147 07/24/22  0817 07/24/22  0754 07/24/22  0432 07/23/22  1242 07/23/22  1059 07/22/22  0757 07/22/22  0729 07/21/22  1211 07/21/22  0955 07/20/22  0752 07/20/22  0648 07/18/22  0537 07/18/22  0532   NA  --   --  142  --   --  140  --  138  --  139   < " > 135*   < > 131*   POTASSIUM  --   --  4.3  --   --  3.7  --  3.6  --  3.7   < > 3.6   < > 4.3   CHLORIDE  --   --  93*  --   --  93*  --  93*  --  95*   < > 92*   < > 94*   CO2  --   --  45*  --   --  40*  --  41*  --  32*   < > 31*   < > 31*   ANIONGAP  --   --  4*  --   --  7  --  4*  --  12   < > 12   < > 6*   * 147* 144* 191*   < > 151*   < > 164*   < > 164*   < > 101*   < > 202*   BUN  --   --  95.9*  --   --  88.6*  --  92.6*  --  89.3*   < > 87.0*   < > 59.5*   CR  --   --  1.32*  --   --  1.24*  --  1.58*  --  1.46*   < > 1.80*   < > 2.04*   GFRESTIMATED  --   --  46*  --   --  50*  --  37*  --  41*   < > 32*   < > 27*   ESTUARDO  --   --  9.7  --   --  9.3  --  9.0  --  8.6*   < > 8.5*   < > 8.6*   MAG  --   --  2.5*  --   --  2.7*  --  2.8*  --  2.6*  --  2.9*   < > 2.8*   PHOS  --   --  4.1  --   --  4.3  --  5.4*  --  5.0*  --  7.2*   < > 5.7*   PROTTOTAL  --   --   --   --   --   --   --   --   --  4.9*  --  4.9*  --  4.7*   ALBUMIN  --   --   --   --   --   --   --   --   --  2.8*  --   --   --  2.8*   BILITOTAL  --   --   --   --   --   --   --   --   --  0.2  --   --   --  0.2   ALKPHOS  --   --   --   --   --   --   --   --   --  74  --   --   --  85   AST  --   --   --   --   --   --   --   --   --  17  --   --   --  19   ALT  --   --   --   --   --   --   --   --   --  18  --   --   --  21    < > = values in this interval not displayed.     CBC:   Recent Labs   Lab 07/24/22  0754 07/23/22  1059 07/22/22  0729 07/21/22  0955   WBC 5.8 7.8 8.6 9.2   RBC 2.35* 2.52* 2.44* 2.70*   HGB 7.3* 8.0* 7.8* 8.4*   HCT 24.8* 25.8* 25.1* 27.6*   * 102* 103* 102*   MCH 31.1 31.7 32.0 31.1   MCHC 29.4* 31.0* 31.1* 30.4*   RDW 17.0* 17.1* 17.5* 17.6*    256 250 305       INR: No lab results found in last 7 days.    Glucose:   Recent Labs   Lab 07/24/22  1147 07/24/22  0817 07/24/22  0754 07/24/22  0432 07/23/22  2325 07/23/22  2229   * 147* 144* 191* 165* 170*       Blood Gas:   Recent  Labs   Lab 07/24/22  0754 07/23/22  1110 07/22/22  0729   PHV 7.42 7.38 7.36   PCO2V 75* 80* 74*   PO2V 47 60* 50*   HCO3V 49* 47* 42*   LINDY 21.8* 18.8* 14.1*   O2PER 30 28 2       Culture Data No results for input(s): CULT in the last 168 hours.    Virology Data:   Lab Results   Component Value Date    FLUAH1 Not Detected 06/29/2022    FLUAH3 Not Detected 06/29/2022    [id number] Not Detected 06/29/2022    IFLUB Not Detected 06/29/2022    RSVA Not Detected 06/29/2022    RSVB Not Detected 06/29/2022    PIV1 Not Detected 06/29/2022    PIV2 Not Detected 06/29/2022    PIV3 Not Detected 06/29/2022    HMPV Not Detected 06/29/2022       Historical CMV results (last 3 of prior testing):  No results found for: CMVQNT  No results found for: CMVLOG    Urine Studies    Recent Labs   Lab Test 07/08/22  0831 07/05/22  1004   URINEPH 5.5 5.5   NITRITE Negative Negative   LEUKEST Negative Negative   WBCU 1 5       Most Recent [facility] Pulmonary Function Testing (FVC/FEV1 only)  FVC-Pre   Date Value Ref Range Status   04/29/2022 1.82 L    11/11/2021 2.17 L    06/14/2021 2.00 L      FVC-%Pred-Pre   Date Value Ref Range Status   04/29/2022 58 %    11/11/2021 70 %    06/14/2021 64 %      FEV1-Pre   Date Value Ref Range Status   04/29/2022 0.51 L    11/11/2021 0.53 L    06/14/2021 0.54 L      FEV1-%Pred-Pre   Date Value Ref Range Status   04/29/2022 20 %    11/11/2021 21 %    06/14/2021 21 %        IMAGING    Recent Results (from the past 48 hour(s))   XR Chest Port 1 View    Narrative    Exam:  Chest X-ray 7/23/2022 9:19 AM    History: Right basilar chest tube s/p lung transplant    Comparison: 7/22/2022    Findings:   Single portable AP view of the chest. Enteric tube side-port is near  the diaphragm. Feeding tube is subdiaphragmatic with its tip above the  field-of-view. Slightly advanced right basilar chest tube.  Unchanged bilateral pleural effusions. Stable left hilar and basilar  opacities. No pneumothorax.      Impression     Impression:   1.  Enteric tube side port is near the diaphragm. Recommend advancing.  2.  Stable bilateral pleural effusions and left basilar opacity.    KANDACE ROJAS MD         SYSTEM ID:  [id number]   CT Chest w/o Contrast    Narrative    EXAMINATION: Chest CT  7/23/2022 5:07 PM    CLINICAL HISTORY: follow up since chest tube removed    COMPARISON: Chest x-ray same day, CT chest 7/18/2022    TECHNIQUE: CT imaging obtained through the chest without contrast.  Axial, coronal, and sagittal reconstructions and axial MIP reformatted  images are reviewed.     FINDINGS:    Devices: Nasogastric and feeding tube tips course into the stomach  then project beyond the field-of-view.    Lower neck and axillae: No enlarged supraclavicular nodes are present.  No actionable nodule is present in the imaged portion of the thyroid  lobes. No axillary lymphadenopathy.    Heart: The heart is enlarged. Unchanged small pericardial fluid.    Mediastinum and sonia: No mediastinal mass is present. Prominent  mediastinal lymph nodes are present, likely reactive.     Vessels: Normal caliber of the aorta and main pulmonary artery.  Scattered scattered atherosclerotic calcifications of the thoracic  aorta and coronary arteries.    Airways: The central tracheobronchial tree is clear.    Lungs: Stable postsurgical changes of bilateral lung transplant.  Intact clam shell sternotomy wires. Right basilar chest tube in place.  Similar-appearing loculated right-sided hydropneumothorax. No  significant change in the small loculated hydropneumothorax on the  right lung base, with air tracking along the fissure. Increased left  loculated hydropneumothorax in the apex and base with especially along  the mediastinum (series 5, image 27). No new focal consolidation.  Increased size of pleural-based lesion in the left upper lobe,,  measuring about 3 cm compared to 2.2 cm (series 3, image 14); apparent  increase in size possibly secondary to  adjacent  atelectasis/consolidation.     Upper abdomen: Limited evaluation but small volume of ascites remains  in the visualized upper abdomen.     Bones: No acute or aggressive osseous abnormality.    Soft tissues: Small amount of subcutaneous emphysema along the right  chest wall.      Impression    IMPRESSION:     1. Increased loculated fluid in the left hemithorax with specks of air  density in the loculated fluid, especially increased loculation along  the  mediastinum, compared to CT 7/18/2022.  2. Similar appearing loculated right hydropneumothorax compared to  prior CT 7/18/2022 with minimal decrease in fluid component.  Right-sided chest tube appears to be outside the loculated  hydropneumothorax  3. Apparent increased size of pleural-based lesion in the left upper  lobe, possibly secondary to associated consolidation/atelectasis.  Recommend attention on follow-up.  4. Partially visualized ascites.  5. Mild subcutaneous emphysema along the right chest tube with minimal  subcutaneous emphysema along the tract of removed left chest tube.  6. Few prominent mediastinal lymph nodes likely reactive. Consider  attention on follow-up.    I have personally reviewed the examination and initial interpretation  and I agree with the findings.    NIKOS JAVED MD         SYSTEM ID:  [id number]   XR Chest Port 1 View    Narrative    Exam:  Chest X-ray 7/24/2022 9:05 AM    History: Right basilar chest tube s/p lung transplant    Comparison: 7/23/2022    Findings:   Single portable AP view of the chest. Enteric tube side-port is near  the diaphragm. Feeding tube is subdiaphragmatic with its tip below the  field-of-view. Stable right basilar chest tube.  Unchanged bilateral pleural effusions with loculation in the left  lateral aspect. Stable perihilar mass. No pneumothorax.      Impression    Impression:   1. Enteric tube side port is near the diaphragm. Recommend advancing.  2. Stable bilateral pleural effusions and left  hilar mass.    KANDACE ROJAS MD         SYSTEM ID:  [id number]

## 2022-07-24 NOTE — PROVIDER NOTIFICATION
Tried taking patient off oxygen per tx recommendation, but patient desat to 87-89 within a couple minutes. Placed on 1 liter now and tolerating well. 02 sat greater than 95.

## 2022-07-25 ENCOUNTER — APPOINTMENT (OUTPATIENT)
Dept: GENERAL RADIOLOGY | Facility: CLINIC | Age: 62
DRG: 007 | End: 2022-07-25
Attending: STUDENT IN AN ORGANIZED HEALTH CARE EDUCATION/TRAINING PROGRAM
Payer: MEDICARE

## 2022-07-25 ENCOUNTER — APPOINTMENT (OUTPATIENT)
Dept: PHYSICAL THERAPY | Facility: CLINIC | Age: 62
DRG: 007 | End: 2022-07-25
Attending: THORACIC SURGERY (CARDIOTHORACIC VASCULAR SURGERY)
Payer: MEDICARE

## 2022-07-25 ENCOUNTER — APPOINTMENT (OUTPATIENT)
Dept: INTERVENTIONAL RADIOLOGY/VASCULAR | Facility: CLINIC | Age: 62
DRG: 007 | End: 2022-07-25
Attending: PHYSICIAN ASSISTANT
Payer: MEDICARE

## 2022-07-25 LAB
ALBUMIN SERPL BCG-MCNC: 2.6 G/DL (ref 3.5–5.2)
ALP SERPL-CCNC: 61 U/L (ref 35–104)
ALT SERPL W P-5'-P-CCNC: 15 U/L (ref 10–35)
AMMONIA PLAS-SCNC: 32 UMOL/L (ref 11–51)
ANION GAP SERPL CALCULATED.3IONS-SCNC: 3 MMOL/L (ref 7–15)
APPEARANCE FLD: ABNORMAL
AST SERPL W P-5'-P-CCNC: 19 U/L (ref 10–35)
BACTERIA PLR CULT: NO GROWTH
BASE EXCESS BLDV CALC-SCNC: 21.3 MMOL/L (ref -7.7–1.9)
BASE EXCESS BLDV CALC-SCNC: 25.1 MMOL/L (ref -7.7–1.9)
BASOPHILS # BLD AUTO: 0 10E3/UL (ref 0–0.2)
BASOPHILS NFR BLD AUTO: 1 %
BILIRUB DIRECT SERPL-MCNC: <0.2 MG/DL (ref 0–0.3)
BILIRUB SERPL-MCNC: 0.2 MG/DL
BUN SERPL-MCNC: 91.1 MG/DL (ref 8–23)
CALCIUM SERPL-MCNC: 9.5 MG/DL (ref 8.8–10.2)
CELL COUNT BODY FLUID SOURCE: ABNORMAL
CHLORIDE SERPL-SCNC: 93 MMOL/L (ref 98–107)
CMV DNA SPEC NAA+PROBE-ACNC: NOT DETECTED IU/ML
COLOR FLD: ABNORMAL
CREAT SERPL-MCNC: 1.2 MG/DL (ref 0.51–0.95)
DEPRECATED HCO3 PLAS-SCNC: 47 MMOL/L (ref 22–29)
EOSINOPHIL # BLD AUTO: 0.2 10E3/UL (ref 0–0.7)
EOSINOPHIL NFR BLD AUTO: 5 %
ERYTHROCYTE [DISTWIDTH] IN BLOOD BY AUTOMATED COUNT: 17 % (ref 10–15)
GFR SERPL CREATININE-BSD FRML MDRD: 52 ML/MIN/1.73M2
GLUCOSE BLDC GLUCOMTR-MCNC: 103 MG/DL (ref 70–99)
GLUCOSE BLDC GLUCOMTR-MCNC: 140 MG/DL (ref 70–99)
GLUCOSE BLDC GLUCOMTR-MCNC: 140 MG/DL (ref 70–99)
GLUCOSE BLDC GLUCOMTR-MCNC: 141 MG/DL (ref 70–99)
GLUCOSE BLDC GLUCOMTR-MCNC: 167 MG/DL (ref 70–99)
GLUCOSE BLDC GLUCOMTR-MCNC: 178 MG/DL (ref 70–99)
GLUCOSE BLDC GLUCOMTR-MCNC: 185 MG/DL (ref 70–99)
GLUCOSE SERPL-MCNC: 172 MG/DL (ref 70–99)
GRAM STAIN RESULT: NORMAL
GRAM STAIN RESULT: NORMAL
HCO3 BLDV-SCNC: 52 MMOL/L (ref 21–28)
HCO3 BLDV-SCNC: 52 MMOL/L (ref 21–28)
HCT VFR BLD AUTO: 24.7 % (ref 35–47)
HGB BLD-MCNC: 7.3 G/DL (ref 11.7–15.7)
IMM GRANULOCYTES # BLD: 0 10E3/UL
IMM GRANULOCYTES NFR BLD: 1 %
LD BODY BODY FLUID SOURCE: NORMAL
LDH FLD L TO P-CCNC: 330 U/L
LYMPHOCYTES # BLD AUTO: 0.1 10E3/UL (ref 0.8–5.3)
LYMPHOCYTES NFR BLD AUTO: 4 %
LYMPHOCYTES NFR FLD MANUAL: 32 %
MAGNESIUM SERPL-MCNC: 2.4 MG/DL (ref 1.7–2.3)
MCH RBC QN AUTO: 31.6 PG (ref 26.5–33)
MCHC RBC AUTO-ENTMCNC: 29.6 G/DL (ref 31.5–36.5)
MCV RBC AUTO: 107 FL (ref 78–100)
MONOCYTES # BLD AUTO: 0.3 10E3/UL (ref 0–1.3)
MONOCYTES NFR BLD AUTO: 8 %
MONOS+MACROS NFR FLD MANUAL: 24 %
NEUTROPHILS # BLD AUTO: 3.3 10E3/UL (ref 1.6–8.3)
NEUTROPHILS NFR BLD AUTO: 81 %
NEUTS BAND NFR FLD MANUAL: 45 %
NRBC # BLD AUTO: 0 10E3/UL
NRBC BLD AUTO-RTO: 0 /100
O2/TOTAL GAS SETTING VFR VENT: 1 %
O2/TOTAL GAS SETTING VFR VENT: 6 %
PCO2 BLDV: 79 MM HG (ref 40–50)
PCO2 BLDV: 91 MM HG (ref 40–50)
PH BLDV: 7.37 [PH] (ref 7.32–7.43)
PH BLDV: 7.43 [PH] (ref 7.32–7.43)
PHOSPHATE SERPL-MCNC: 4 MG/DL (ref 2.5–4.5)
PLATELET # BLD AUTO: 267 10E3/UL (ref 150–450)
PO2 BLDV: 40 MM HG (ref 25–47)
PO2 BLDV: 46 MM HG (ref 25–47)
POTASSIUM SERPL-SCNC: 4.6 MMOL/L (ref 3.4–5.3)
PROT FLD-MCNC: 1.9 G/DL
PROT SERPL-MCNC: 4.7 G/DL (ref 6.4–8.3)
PROTEIN BODY FLUID SOURCE: NORMAL
RBC # BLD AUTO: 2.31 10E6/UL (ref 3.8–5.2)
SODIUM SERPL-SCNC: 143 MMOL/L (ref 136–145)
TACROLIMUS BLD-MCNC: 6.9 UG/L (ref 5–15)
TME LAST DOSE: NORMAL H
TME LAST DOSE: NORMAL H
TRIGL FLD-MCNC: 13 MG/DL
TRIGLYCERIDE BODY FLUID SOURCE: NORMAL
UFH PPP CHRO-ACNC: 0.31 IU/ML
WBC # BLD AUTO: 4 10E3/UL (ref 4–11)
WBC # FLD AUTO: 1271 /UL

## 2022-07-25 PROCEDURE — 99233 SBSQ HOSP IP/OBS HIGH 50: CPT | Performed by: STUDENT IN AN ORGANIZED HEALTH CARE EDUCATION/TRAINING PROGRAM

## 2022-07-25 PROCEDURE — 32557 INSERT CATH PLEURA W/ IMAGE: CPT | Mod: LT | Performed by: RADIOLOGY

## 2022-07-25 PROCEDURE — 84478 ASSAY OF TRIGLYCERIDES: CPT | Performed by: PHYSICIAN ASSISTANT

## 2022-07-25 PROCEDURE — 82803 BLOOD GASES ANY COMBINATION: CPT | Performed by: PHYSICIAN ASSISTANT

## 2022-07-25 PROCEDURE — 87116 MYCOBACTERIA CULTURE: CPT | Performed by: STUDENT IN AN ORGANIZED HEALTH CARE EDUCATION/TRAINING PROGRAM

## 2022-07-25 PROCEDURE — 87070 CULTURE OTHR SPECIMN AEROBIC: CPT | Performed by: STUDENT IN AN ORGANIZED HEALTH CARE EDUCATION/TRAINING PROGRAM

## 2022-07-25 PROCEDURE — 85004 AUTOMATED DIFF WBC COUNT: CPT | Performed by: STUDENT IN AN ORGANIZED HEALTH CARE EDUCATION/TRAINING PROGRAM

## 2022-07-25 PROCEDURE — C1729 CATH, DRAINAGE: HCPCS

## 2022-07-25 PROCEDURE — 99233 SBSQ HOSP IP/OBS HIGH 50: CPT | Mod: 24 | Performed by: PHYSICIAN ASSISTANT

## 2022-07-25 PROCEDURE — 250N000013 HC RX MED GY IP 250 OP 250 PS 637: Performed by: STUDENT IN AN ORGANIZED HEALTH CARE EDUCATION/TRAINING PROGRAM

## 2022-07-25 PROCEDURE — 71045 X-RAY EXAM CHEST 1 VIEW: CPT | Mod: 26 | Performed by: RADIOLOGY

## 2022-07-25 PROCEDURE — 120N000002 HC R&B MED SURG/OB UMMC

## 2022-07-25 PROCEDURE — 84157 ASSAY OF PROTEIN OTHER: CPT | Performed by: STUDENT IN AN ORGANIZED HEALTH CARE EDUCATION/TRAINING PROGRAM

## 2022-07-25 PROCEDURE — 94660 CPAP INITIATION&MGMT: CPT

## 2022-07-25 PROCEDURE — 250N000013 HC RX MED GY IP 250 OP 250 PS 637: Performed by: THORACIC SURGERY (CARDIOTHORACIC VASCULAR SURGERY)

## 2022-07-25 PROCEDURE — 250N000013 HC RX MED GY IP 250 OP 250 PS 637

## 2022-07-25 PROCEDURE — 94668 MNPJ CHEST WALL SBSQ: CPT

## 2022-07-25 PROCEDURE — 272N000196 HC ACCESSORY CR5

## 2022-07-25 PROCEDURE — 250N000013 HC RX MED GY IP 250 OP 250 PS 637: Performed by: SURGERY

## 2022-07-25 PROCEDURE — 250N000012 HC RX MED GY IP 250 OP 636 PS 637: Performed by: PHYSICIAN ASSISTANT

## 2022-07-25 PROCEDURE — 250N000012 HC RX MED GY IP 250 OP 636 PS 637: Performed by: STUDENT IN AN ORGANIZED HEALTH CARE EDUCATION/TRAINING PROGRAM

## 2022-07-25 PROCEDURE — 250N000011 HC RX IP 250 OP 636: Performed by: STUDENT IN AN ORGANIZED HEALTH CARE EDUCATION/TRAINING PROGRAM

## 2022-07-25 PROCEDURE — 250N000012 HC RX MED GY IP 250 OP 636 PS 637: Performed by: NURSE PRACTITIONER

## 2022-07-25 PROCEDURE — 36415 COLL VENOUS BLD VENIPUNCTURE: CPT | Performed by: NURSE PRACTITIONER

## 2022-07-25 PROCEDURE — 250N000009 HC RX 250: Performed by: STUDENT IN AN ORGANIZED HEALTH CARE EDUCATION/TRAINING PROGRAM

## 2022-07-25 PROCEDURE — 87205 SMEAR GRAM STAIN: CPT | Performed by: STUDENT IN AN ORGANIZED HEALTH CARE EDUCATION/TRAINING PROGRAM

## 2022-07-25 PROCEDURE — 89051 BODY FLUID CELL COUNT: CPT | Performed by: STUDENT IN AN ORGANIZED HEALTH CARE EDUCATION/TRAINING PROGRAM

## 2022-07-25 PROCEDURE — 97530 THERAPEUTIC ACTIVITIES: CPT | Mod: GP

## 2022-07-25 PROCEDURE — 85520 HEPARIN ASSAY: CPT | Performed by: STUDENT IN AN ORGANIZED HEALTH CARE EDUCATION/TRAINING PROGRAM

## 2022-07-25 PROCEDURE — 250N000013 HC RX MED GY IP 250 OP 250 PS 637: Performed by: NURSE PRACTITIONER

## 2022-07-25 PROCEDURE — 82803 BLOOD GASES ANY COMBINATION: CPT | Performed by: STUDENT IN AN ORGANIZED HEALTH CARE EDUCATION/TRAINING PROGRAM

## 2022-07-25 PROCEDURE — 80197 ASSAY OF TACROLIMUS: CPT | Performed by: PHYSICIAN ASSISTANT

## 2022-07-25 PROCEDURE — 36415 COLL VENOUS BLD VENIPUNCTURE: CPT | Performed by: PHYSICIAN ASSISTANT

## 2022-07-25 PROCEDURE — C1769 GUIDE WIRE: HCPCS

## 2022-07-25 PROCEDURE — 272N000500 HC NEEDLE CR2

## 2022-07-25 PROCEDURE — 94640 AIRWAY INHALATION TREATMENT: CPT

## 2022-07-25 PROCEDURE — 83615 LACTATE (LD) (LDH) ENZYME: CPT | Performed by: STUDENT IN AN ORGANIZED HEALTH CARE EDUCATION/TRAINING PROGRAM

## 2022-07-25 PROCEDURE — 87206 SMEAR FLUORESCENT/ACID STAI: CPT | Performed by: STUDENT IN AN ORGANIZED HEALTH CARE EDUCATION/TRAINING PROGRAM

## 2022-07-25 PROCEDURE — 87102 FUNGUS ISOLATION CULTURE: CPT | Performed by: STUDENT IN AN ORGANIZED HEALTH CARE EDUCATION/TRAINING PROGRAM

## 2022-07-25 PROCEDURE — 82140 ASSAY OF AMMONIA: CPT | Performed by: NURSE PRACTITIONER

## 2022-07-25 PROCEDURE — 82248 BILIRUBIN DIRECT: CPT | Performed by: NURSE PRACTITIONER

## 2022-07-25 PROCEDURE — 999N000157 HC STATISTIC RCP TIME EA 10 MIN

## 2022-07-25 PROCEDURE — 83735 ASSAY OF MAGNESIUM: CPT | Performed by: STUDENT IN AN ORGANIZED HEALTH CARE EDUCATION/TRAINING PROGRAM

## 2022-07-25 PROCEDURE — 272N000192 HC ACCESSORY CR2

## 2022-07-25 PROCEDURE — 0W9B30Z DRAINAGE OF LEFT PLEURAL CAVITY WITH DRAINAGE DEVICE, PERCUTANEOUS APPROACH: ICD-10-PCS | Performed by: STUDENT IN AN ORGANIZED HEALTH CARE EDUCATION/TRAINING PROGRAM

## 2022-07-25 PROCEDURE — 250N000011 HC RX IP 250 OP 636

## 2022-07-25 PROCEDURE — 32557 INSERT CATH PLEURA W/ IMAGE: CPT

## 2022-07-25 PROCEDURE — 94640 AIRWAY INHALATION TREATMENT: CPT | Mod: 76

## 2022-07-25 PROCEDURE — 71045 X-RAY EXAM CHEST 1 VIEW: CPT

## 2022-07-25 PROCEDURE — 88305 TISSUE EXAM BY PATHOLOGIST: CPT | Mod: TC | Performed by: STUDENT IN AN ORGANIZED HEALTH CARE EDUCATION/TRAINING PROGRAM

## 2022-07-25 PROCEDURE — 250N000009 HC RX 250: Performed by: SURGERY

## 2022-07-25 PROCEDURE — 84100 ASSAY OF PHOSPHORUS: CPT | Performed by: STUDENT IN AN ORGANIZED HEALTH CARE EDUCATION/TRAINING PROGRAM

## 2022-07-25 PROCEDURE — 87075 CULTR BACTERIA EXCEPT BLOOD: CPT | Performed by: STUDENT IN AN ORGANIZED HEALTH CARE EDUCATION/TRAINING PROGRAM

## 2022-07-25 PROCEDURE — 97116 GAIT TRAINING THERAPY: CPT | Mod: GP

## 2022-07-25 RX ORDER — MYCOPHENOLATE MOFETIL 200 MG/ML
750 POWDER, FOR SUSPENSION ORAL 2 TIMES DAILY
Status: DISCONTINUED | OUTPATIENT
Start: 2022-07-25 | End: 2022-07-26

## 2022-07-25 RX ORDER — MYCOPHENOLATE MOFETIL 200 MG/ML
750 POWDER, FOR SUSPENSION ORAL 2 TIMES DAILY
Status: DISCONTINUED | OUTPATIENT
Start: 2022-07-25 | End: 2022-07-25

## 2022-07-25 RX ORDER — LIDOCAINE HYDROCHLORIDE 10 MG/ML
1-30 INJECTION, SOLUTION EPIDURAL; INFILTRATION; INTRACAUDAL; PERINEURAL
Status: COMPLETED | OUTPATIENT
Start: 2022-07-25 | End: 2022-07-25

## 2022-07-25 RX ADMIN — PREDNISONE 15 MG: 5 TABLET ORAL at 08:49

## 2022-07-25 RX ADMIN — Medication 40 MG: at 10:34

## 2022-07-25 RX ADMIN — METOPROLOL TARTRATE 25 MG: 25 TABLET, FILM COATED ORAL at 08:50

## 2022-07-25 RX ADMIN — Medication 2.5 MG: at 00:01

## 2022-07-25 RX ADMIN — NYSTATIN 1000000 UNITS: 100000 SUSPENSION ORAL at 08:48

## 2022-07-25 RX ADMIN — ACETAMINOPHEN 975 MG: 325 TABLET, FILM COATED ORAL at 13:26

## 2022-07-25 RX ADMIN — CALCIUM CARBONATE 600 MG (1,500 MG)-VITAMIN D3 400 UNIT TABLET 1 TABLET: at 17:40

## 2022-07-25 RX ADMIN — HEPARIN SODIUM 900 UNITS/HR: 10000 INJECTION, SOLUTION INTRAVENOUS at 10:44

## 2022-07-25 RX ADMIN — Medication 5 MG: at 21:51

## 2022-07-25 RX ADMIN — Medication 1 PACKET: at 12:19

## 2022-07-25 RX ADMIN — HYDROXYZINE HYDROCHLORIDE 50 MG: 25 TABLET ORAL at 20:24

## 2022-07-25 RX ADMIN — ONDANSETRON 4 MG: 4 TABLET, ORALLY DISINTEGRATING ORAL at 08:50

## 2022-07-25 RX ADMIN — Medication 5 MG: at 08:48

## 2022-07-25 RX ADMIN — ACETAMINOPHEN 650 MG: 325 TABLET, FILM COATED ORAL at 15:46

## 2022-07-25 RX ADMIN — Medication 5 MG: at 17:40

## 2022-07-25 RX ADMIN — SIMETHICONE 40 MG: 20 SUSPENSION/ DROPS ORAL at 00:02

## 2022-07-25 RX ADMIN — ASPIRIN 81 MG CHEWABLE TABLET 81 MG: 81 TABLET CHEWABLE at 10:33

## 2022-07-25 RX ADMIN — VALGANCICLOVIR HYDROCHLORIDE 450 MG: 50 POWDER, FOR SOLUTION ORAL at 08:47

## 2022-07-25 RX ADMIN — METOPROLOL TARTRATE 25 MG: 25 TABLET, FILM COATED ORAL at 20:21

## 2022-07-25 RX ADMIN — ACETAMINOPHEN 975 MG: 325 TABLET, FILM COATED ORAL at 08:49

## 2022-07-25 RX ADMIN — ACETYLCYSTEINE 2 ML: 200 SOLUTION ORAL; RESPIRATORY (INHALATION) at 20:28

## 2022-07-25 RX ADMIN — ACETYLCYSTEINE 2 ML: 200 SOLUTION ORAL; RESPIRATORY (INHALATION) at 08:35

## 2022-07-25 RX ADMIN — NYSTATIN 1000000 UNITS: 100000 SUSPENSION ORAL at 17:40

## 2022-07-25 RX ADMIN — LIDOCAINE HYDROCHLORIDE 10 ML: 10 INJECTION, SOLUTION EPIDURAL; INFILTRATION; INTRACAUDAL; PERINEURAL at 15:01

## 2022-07-25 RX ADMIN — VANCOMYCIN HYDROCHLORIDE 125 MG: KIT at 20:21

## 2022-07-25 RX ADMIN — MYCOPHENOLATE MOFETIL 750 MG: 200 POWDER, FOR SUSPENSION ORAL at 10:34

## 2022-07-25 RX ADMIN — MYCOPHENOLATE MOFETIL 750 MG: 200 POWDER, FOR SUSPENSION ORAL at 20:21

## 2022-07-25 RX ADMIN — Medication 5 MG: at 13:32

## 2022-07-25 RX ADMIN — INSULIN ASPART 2 UNITS: 100 INJECTION, SOLUTION INTRAVENOUS; SUBCUTANEOUS at 04:39

## 2022-07-25 RX ADMIN — TACROLIMUS 5 MG: 5 CAPSULE ORAL at 08:47

## 2022-07-25 RX ADMIN — NYSTATIN 1000000 UNITS: 100000 SUSPENSION ORAL at 21:52

## 2022-07-25 RX ADMIN — VANCOMYCIN HYDROCHLORIDE 125 MG: KIT at 15:51

## 2022-07-25 RX ADMIN — ACETAMINOPHEN 975 MG: 325 TABLET, FILM COATED ORAL at 20:21

## 2022-07-25 RX ADMIN — TACROLIMUS 5.5 MG: 5 CAPSULE ORAL at 17:42

## 2022-07-25 RX ADMIN — LEVALBUTEROL HYDROCHLORIDE 1.25 MG: 1.25 SOLUTION RESPIRATORY (INHALATION) at 12:35

## 2022-07-25 RX ADMIN — VANCOMYCIN HYDROCHLORIDE 125 MG: KIT at 08:47

## 2022-07-25 RX ADMIN — NYSTATIN: 100000 CREAM TOPICAL at 20:27

## 2022-07-25 RX ADMIN — Medication 1 CAPSULE: at 10:33

## 2022-07-25 RX ADMIN — Medication 40 MG: at 20:21

## 2022-07-25 RX ADMIN — INSULIN ASPART 1 UNITS: 100 INJECTION, SOLUTION INTRAVENOUS; SUBCUTANEOUS at 00:04

## 2022-07-25 RX ADMIN — THIAMINE HCL TAB 100 MG 100 MG: 100 TAB at 10:33

## 2022-07-25 RX ADMIN — NYSTATIN 1000000 UNITS: 100000 SUSPENSION ORAL at 13:27

## 2022-07-25 RX ADMIN — LEVALBUTEROL HYDROCHLORIDE 1.25 MG: 1.25 SOLUTION RESPIRATORY (INHALATION) at 08:35

## 2022-07-25 RX ADMIN — PREDNISONE 12.5 MG: 2.5 TABLET ORAL at 20:21

## 2022-07-25 RX ADMIN — INSULIN ASPART 2 UNITS: 100 INJECTION, SOLUTION INTRAVENOUS; SUBCUTANEOUS at 20:22

## 2022-07-25 RX ADMIN — LEVALBUTEROL HYDROCHLORIDE 1.25 MG: 1.25 SOLUTION RESPIRATORY (INHALATION) at 20:28

## 2022-07-25 RX ADMIN — ACETYLCYSTEINE 2 ML: 200 SOLUTION ORAL; RESPIRATORY (INHALATION) at 12:35

## 2022-07-25 RX ADMIN — THERA TABS 1 TABLET: TAB at 12:16

## 2022-07-25 RX ADMIN — DAPSONE 50 MG: 25 TABLET ORAL at 10:48

## 2022-07-25 RX ADMIN — INSULIN GLARGINE 15 UNITS: 100 INJECTION, SOLUTION SUBCUTANEOUS at 09:00

## 2022-07-25 RX ADMIN — Medication 2.5 MG: at 04:38

## 2022-07-25 RX ADMIN — LEVALBUTEROL HYDROCHLORIDE 1.25 MG: 1.25 SOLUTION RESPIRATORY (INHALATION) at 15:40

## 2022-07-25 RX ADMIN — VANCOMYCIN HYDROCHLORIDE 125 MG: KIT at 02:47

## 2022-07-25 RX ADMIN — INSULIN GLARGINE 15 UNITS: 100 INJECTION, SOLUTION SUBCUTANEOUS at 20:22

## 2022-07-25 RX ADMIN — ACETYLCYSTEINE 2 ML: 200 SOLUTION ORAL; RESPIRATORY (INHALATION) at 15:40

## 2022-07-25 RX ADMIN — CALCIUM CARBONATE 600 MG (1,500 MG)-VITAMIN D3 400 UNIT TABLET 1 TABLET: at 10:33

## 2022-07-25 ASSESSMENT — ACTIVITIES OF DAILY LIVING (ADL)
ADLS_ACUITY_SCORE: 32

## 2022-07-25 NOTE — PROGRESS NOTES
Pulmonary Medicine  Cystic Fibrosis - Lung Transplant Team  Progress Note  2022       Patient: Sofie Rodriguez  MRN: [id number]  : 1960 (age 62 year old)  Transplant: 2022 (Lung), POD#27  Admission date: 2022    Assessment & Plan:     Sofie Rodriguez is a 62 year old female with a PMH significant for end-stage COPD, HTN, HFpEF, Mycobacterium peregrinum colonization, h/o hepatitis C, HECTOR s/p LEEP procedure, osteopenia, and former methamphetamine use.  Pt. is now s/p BSLT on 22, lungs slightly undersized.   Persistent low dose pressor needs post-op through 7/10.  Extubated POD #2 but with persistent hypercapnia, mostly compensated and only slightly improved with intermittent BiPAP.  Also with left radial artery thrombus (presumed secondary to arterial line) s/p thrombectomy 7/, BACILIO, and C diff.  Rehabilitation and airway clearance initially limited by dysrhythmia and gastric discomfort, now with gradual improvement.  Remains on 1-2L oxymask during the day and BiPAP overnight.      Today's recommendations:  - DSA, EBV, IgG, and donor risk labs ordered   - Wean supplemental oxygen as able to maintain SpO2 >92%, continue BiPAP overnight/with naps, repeat VBG ordered this afternoon  - Following pleural cultures, right chest tube to remain given output  - CXR daily as below  - IR consult for evaluation of chest tube to left apical fluid collection, obtain pleural cultures/studies if/when placed  - Tacrolimus level subtherapeutic, dose increased, repeat level ordered   - Transitioned back to reduced dose suspension MMF (rather than PO Myfortic) for now given inconsistency with PO medications/NG tube  - Prednisone taper ordered    - Defer increasing dapsone to daily pending hgb stability   - CMV () pending  - Transition to DOAC after PEG/J placement  - Continue with NG to LIS (except for when taking PO medications), IR to place PEG/J tube (delayed by supply chain issue, timeline  TBD)  - PO vancomycin per primary team     S/p bilateral sequential lung transplant (BSLT) for end stage COPD:  Persistent hypercapneic respiratory failure:   Persistent hypotension, Resolved:   Bilateral hydroPTX:  Right hemidiaphragm palsy: Explant pathology with severe emphysema with subpleural bullae formation, changes of chronic bronchitis, subpleural scars and patchy pulmonary edema; benign hilar lymph nodes.  No evidence of PGD post-op.  Extubated 6/30.  Persistent hypercapnia without dyspnea, appears to be a respiratory drive problem.  Some improvement with BiPAP, limited tolerance in part due to anxiety.  Persistent low dose pressors weaned off 7/10, on scheduled midodrine (also s/p hydrocortisone 7/6-7/9 and fludrocortisone 7/6-7/11).  SNIFF (7/14) notable for right hemidiaphragm palsy.  Chest CT (7/18) with bilateral biapical effusions R>L and improved LLL atelectasis, also with LLL hydroPTX and bilateral PTX; bilateral chest tubes not communicating well with loculated effusion areas.  Bronch (7/19) with slight graying of mucosa noted at right anastomosis and scant secretions (slightly increased in RLL).  Left chest tube removed 7/20.  Chest CT (7/23) with increased loculated fluid within the left hemithorax with specks of air density in the loculated fluid, similar appearing loculated right hydroPTX with minimal decrease in fluid component (right chest tube appears to be outside the loculated hydroPTX), increased size of pleural based lesion in MARLON, and mild subcutaneous emphysema along right chest tube with minimal along tract of removed left chest tube.  On 1-2L oxymask during the day (SpO2 high 80s on RA 7/24) with BiPAP overnight (ongoing hypercapnia, increased 7/25).    - DSA one month post-transplant (7/28, ordered)  - Ammonia monitoring twice weekly (screening for hyperammonemia post-lung transplant)  - Nebs: levalbuterol and Mucomyst QID  - Pulmonary toilet with chest physiotherapy  QID  - Aerobika and incentive spirometry hourly while awake  - Supplemental oxygen as needed to maintain SpO2 >92% (wean as able), BiPAP overnight/with naps given persistent hypercapnia, repeat VBG 7/25 at 1300 (ordered)  - Right chest tube managed by surgical team, remains with moderate output  - CXR daily, today with ongoing bilateral pleural effusions with left-sided loculated lateral component, increased left midlung linear opacitiy and opacification of MARLON, and bilateral perihilar and left basilar hazy opacities (personally reviewed with Dr. Franks)  - IR consult for evaluation of chest tube to left apical fluid collection, obtain pleural cultures/studies if/when placed  - Volume management per primary team  - Repeat inspection bronch in one month (~8/19), sooner if indicated  - Encourage activity including up to the chair and PT/OT     Immunosuppression:  Induction therapy with basiliximab (and high dose IV steroid) given intraoperatively, repeating basiliximab dose on POD#4.  - Tacrolimus 5 mg BID (via NJ tube, peak level appropriate 7/11).  Goal level 8-12.  Level 7/25 subtherapeutic at 6.9 at 13h, dose increased to 5.5 mg BID, repeat level 7/28 (ordered).  - Myfortic 720 mg BID (7/23, given GI symptoms) --> adjust back to suspension MMF at reduced dose of 750 mg BID (ordered) for now given inconsistency with PO medications/NG tube in place (per discussion with RN/pharmacist), will likely plan to transition back to PO Myfortic once PEG/J tube placed and consistently tolerating PO medications   - Prednisone 15 mg qAM / 12.5 mg qPM, next taper due 7/28 (ordered)  Date AM dose (mg) PM dose (mg)   7/21/22 15 12.5   7/28/22 12.5 12.5   8/4/22 12.5 10   8/18/22 10 10   9/15/22 10 7.5   10/13/22 7.5 7.5   11/10/22 7.5 5   12/8/22 5 5   1/5/23 5 2.5      Prophylaxis:   - Dapsone for PJP ppx (7/18), defer increasing to daily pending hgb stability  - VGCV for CMV ppx  - Nystatin for oral candidiasis ppx, 6 month  course  - See below for serologies and viral ppx:    Donor Recipient Intervention   CMV status Positive Positive Valganciclovir POD #8-90, CMV (7/25) pending   EBV status Positive Positive EBV check monthly (7/28, ordered)   HSV status N/A Positive Not indicated      ID:  H/o M. peregrinum colonization: Donor bronch cultures (OSH) with Strep beta hemolytic (not group A).  Recipient cultures as below.  Bronch cultures (7/12) NGTD.  - IgG at one month (7/28, ordered)  - AFB bronch culture (6/28, 6/29, 7/12) NGTD, AFB to be sent on all future bronchs  - Right pleural cultures (7/20) NGTD, follow     Streptococcus pneumoniae:  Stenotrophomonas maltophilia: Noted in recipient cultures at time of transplant.  S/p ceftazidime 6/28-7/10, vancomycin 7/7-7/8 and 6/28-6/30, and levofloxacin 7/10-7/12 for total 2 week ABX course.     H/o hepatitis C: Diagnosed in 1980s, 2 mos of treatment, quant negative since 10/2017, last positive 2/20/17 ([phone]).  Glenis positive on 6/2021 with negative HCV PCR.  H/o remote EtOH abuse.  MR elastography (4/27/21) with hepatology review and consult without any concerns post transplant.  Hepatitis C RNA negative and hepatitis C antibody positive (old) on 6/28.     PHS risk criteria donor:  Additional labs required post-transplant (between 4-8 weeks post-op): Hepatitis B, Hepatitis C, and HIV by SHERI ([id number], ordered 7/28).     Other relevant problems managed by primary team:      SVT:   Aflutter with RVR: SVT first noted on 7/14, prior to HD line placement.  Continues intermittently.  Aflutter with RVR to 200 7/17 triggered by activity.  EP consulted 7/17 given persistent tachycardia/dysrhythmia.  Midodrine held 7/19.  - Metoprolol per primary team (started 7/15)  - Heparin gtt (7/17) and then transition to DOAC after PEG/J placement     Left hand ischemia: Radial artery thrombosis identified on duplex doppler.  S/p thrombectomy on 7/2.  Completed high intensity heparin course.  Continue daily  aspirin.      BACILIO:   Hyperkalemia: Likely multifactorial including medications (Bactrim, tacrolimus) and hypotension.  Fludrocortisone 7/6-7/11.  Potassium now stable.  S/p non-tunneled HD line 7/14.  Initial iHD run 7/14 limited by afib with RVR vs SVT.  Last iHD run 7/16, line removed 7/22.  - Tacrolimus monitoring as above  - Management per nephrology and primary team     Abdominal pain: Noted 7/15, cramping pain.  ACR with large stool burden in colon, no obstruction or distention.  Some nausea but no emesis.  CT abdomen (7/15) with moderate to large gastric distention, otherwise without obstruction.  NG placed for LIS with moderate to large output for several days.  Increased abdominal pain 7/17 after clamping for 4 hours, tolerated clamping today without issue.  Gastric emptying study (7/20) with severe gastric emptying delay (95% retention at 4 hours).   - Simethicone prn  - Continue with TF via NJ and NG to LIS (except for when taking PO medications), IR to place PEG/J tube (delayed by supply chain issue, timeline TBD)  - Additional management per primary     C diff infection: Abdominal pain with diarrhea noted 7/8, AXR without dilated bowel, moderate colonic stool burden.  C diff positive 7/11.  Loose stools (on tube feeds) stable.  - PO vancomycin (7/11) per primary team     Hypomagnesemia: Suppressed absorption d/t CNI.   - Continue daily magnesium with replacement protocol prn, schedule oral magnesium supplementation if needed pending improvement in stools    We appreciate the excellent care provided by the [place] [id number] team.  Recommendations communicated via in person rounding and this note.  Will continue to follow along closely, please do not hesitate to call with any questions or concerns.    Patient discussed with Dr. Franks.    Yuliet Aviles PA-C  [facility]patient [facility]  Pulmonary CF/Transplant     Subjective & Interval History:     Used BiPAP 15/5 30% overnight for ~5h otherwise on 1-2L via oxymask  "depending on activity level.  Worsening hypercapnia this morning, does endorse feeling more tired over the last day or so.  Walking the halls with therapy, denies significant dyspnea.  Cough infrequent and nonproductive.  Received chest physiotherapy TID yesterday.  Right chest tube with ongoing moderate output.  Mildly tachycardic at times.  Abdominal cramping and loose stool frequency improving.      Review of Systems:     C: + intermittent low grade temps, no chills, + decreased weight  INTEGUMENTARY/SKIN: No rash or obvious new lesions  ENT/MOUTH: + sore throat, no sinus pain, no nasal congestion or drainage  RESP: See interval history  CV: No chest pain, no palpitations, + peripheral edema  GI: + intermittent nausea, no vomiting, no reflux symptoms  : No dysuria  MUSCULOSKELETAL: No myalgias, no arthralgias  ENDOCRINE: Blood sugars with adequate control  NEURO: No headache, no numbness or tingling  PSYCHIATRIC: Mood stable    Physical Exam:     All notes, images, and labs from past 24 hours (at minimum) were reviewed.    Vital signs:  Temp: 98.6  F (37  C) Temp src: Oral BP: 132/85 Pulse: 112   Resp: 16 SpO2: 98 % O2 Device: Oxymask Oxygen Delivery: 2 LPM Height: 157.5 cm (5' 2\") Weight: 70.9 kg (156 lb 4.8 oz)  I/O:     Intake/Output Summary (Last 24 hours) at 7/25/2022 1235  Last data filed at 7/25/2022 0900  Gross per 24 hour   Intake 1293 ml   Output 1340 ml   Net -47 ml     Constitutional: Sitting up in chair, in no apparent distress.   HEENT: Eyes with pink conjunctivae, anicteric.  Oral mucosa moist without lesions.  NJ and NG tubes.  PULM: Diminished air flow t/o left and to RLL.  Few scattered crackles.  No rhonchi, no wheezes.  Non-labored breathing on 2L oxymask.  CV: Normal S1 and S2.  Tachycardic.  No murmur, gallop, or rub.  Trace BLE edema.   ABD: NABS, softly distended, nontender.  MSK: Moves all extremities.  + muscle wasting.   NEURO: Alert, conversant.   SKIN: Warm, dry.  No rash on limited " exam.   PSYCH: Mood stable.     Lines, Drains, and Devices:  Peripheral IV 07/20/22 Anterior;Right Lower forearm (Active)   Site Assessment WDL 07/25/22 0800   Line Status Infusing 07/25/22 0800   Dressing Intervention New dressing  07/20/22 1548   Phlebitis Scale 0-->no symptoms 07/25/22 0800   Infiltration Scale 0 07/25/22 0400   Number of days: 5     Data:     LABS    CMP:   Recent Labs   Lab 07/25/22  1218 07/25/22  0751 07/25/22  0648 07/25/22  0439 07/24/22  0817 07/24/22  0754 07/23/22  1242 07/23/22  1059 07/22/22  0757 07/22/22  0729 07/21/22  1211 07/21/22  0955 07/20/22  0752 07/20/22  0648   NA  --   --  143  --   --  142  --  140  --  138  --  139   < > 135*   POTASSIUM  --   --  4.6  --   --  4.3  --  3.7  --  3.6  --  3.7   < > 3.6   CHLORIDE  --   --  93*  --   --  93*  --  93*  --  93*  --  95*   < > 92*   CO2  --   --  47*  --   --  45*  --  40*  --  41*  --  32*   < > 31*   ANIONGAP  --   --  3*  --   --  4*  --  7  --  4*  --  12   < > 12   * 167* 172* 178*   < > 144*   < > 151*   < > 164*   < > 164*   < > 101*   BUN  --   --  91.1*  --   --  95.9*  --  88.6*  --  92.6*  --  89.3*   < > 87.0*   CR  --   --  1.20*  --   --  1.32*  --  1.24*  --  1.58*  --  1.46*   < > 1.80*   GFRESTIMATED  --   --  52*  --   --  46*  --  50*  --  37*  --  41*   < > 32*   ESTUARDO  --   --  9.5  --   --  9.7  --  9.3  --  9.0  --  8.6*   < > 8.5*   MAG  --   --  2.4*  --   --  2.5*  --  2.7*  --  2.8*  --  2.6*  --  2.9*   PHOS  --   --  4.0  --   --  4.1  --  4.3  --  5.4*  --  5.0*  --  7.2*   PROTTOTAL  --   --  4.7*  --   --  4.6*  --   --   --   --   --  4.9*  --  4.9*   ALBUMIN  --   --  2.6*  --   --   --   --   --   --   --   --  2.8*  --   --    BILITOTAL  --   --  0.2  --   --   --   --   --   --   --   --  0.2  --   --    ALKPHOS  --   --  61  --   --   --   --   --   --   --   --  74  --   --    AST  --   --  19  --   --   --   --   --   --   --   --  17  --   --    ALT  --   --  15  --   --   --   --    --   --   --   --  18  --   --     < > = values in this interval not displayed.     CBC:   Recent Labs   Lab 07/25/22  0648 07/24/22  0754 07/23/22  1059 07/22/22  0729   WBC 4.0 5.8 7.8 8.6   RBC 2.31* 2.35* 2.52* 2.44*   HGB 7.3* 7.3* 8.0* 7.8*   HCT 24.7* 24.8* 25.8* 25.1*   * 106* 102* 103*   MCH 31.6 31.1 31.7 32.0   MCHC 29.6* 29.4* 31.0* 31.1*   RDW 17.0* 17.0* 17.1* 17.5*    256 256 250       INR: No lab results found in last 7 days.    Glucose:   Recent Labs   Lab 07/25/22  1218 07/25/22  0751 07/25/22  0648 07/25/22  0439 07/25/22  0001 07/24/22  2034   * 167* 172* 178* 141* 114*       Blood Gas:   Recent Labs   Lab 07/25/22  0648 07/24/22  0754 07/23/22  1110   PHV 7.37 7.42 7.38   PCO2V 91* 75* 80*   PO2V 46 47 60*   HCO3V 52* 49* 47*   LINDY 21.3* 21.8* 18.8*   O2PER 6 30 28       Culture Data No results for input(s): CULT in the last 168 hours.    Virology Data:   Lab Results   Component Value Date    FLUAH1 Not Detected 06/29/2022    FLUAH3 Not Detected 06/29/2022    AP2915 Not Detected 06/29/2022    IFLUB Not Detected 06/29/2022    RSVA Not Detected 06/29/2022    RSVB Not Detected 06/29/2022    PIV1 Not Detected 06/29/2022    PIV2 Not Detected 06/29/2022    PIV3 Not Detected 06/29/2022    HMPV Not Detected 06/29/2022       Historical CMV results (last 3 of prior testing):  No results found for: CMVQNT  No results found for: CMVLOG    Urine Studies    Recent Labs   Lab Test 07/08/22  0831 07/05/22  1004   URINEPH 5.5 5.5   NITRITE Negative Negative   LEUKEST Negative Negative   WBCU 1 5       Most Recent [facility] Pulmonary Function Testing (FVC/FEV1 only)  FVC-Pre   Date Value Ref Range Status   04/29/2022 1.82 L    11/11/2021 2.17 L    06/14/2021 2.00 L      FVC-%Pred-Pre   Date Value Ref Range Status   04/29/2022 58 %    11/11/2021 70 %    06/14/2021 64 %      FEV1-Pre   Date Value Ref Range Status   04/29/2022 0.51 L    11/11/2021 0.53 L    06/14/2021 0.54 L      FEV1-%Pred-Pre    Date Value Ref Range Status   04/29/2022 20 %    11/11/2021 21 %    06/14/2021 21 %        IMAGING    Recent Results (from the past 48 hour(s))   CT Chest w/o Contrast    Narrative    EXAMINATION: Chest CT  7/23/2022 5:07 PM    CLINICAL HISTORY: follow up since chest tube removed    COMPARISON: Chest x-ray same day, CT chest 7/18/2022    TECHNIQUE: CT imaging obtained through the chest without contrast.  Axial, coronal, and sagittal reconstructions and axial MIP reformatted  images are reviewed.     FINDINGS:    Devices: Nasogastric and feeding tube tips course into the stomach  then project beyond the field-of-view.    Lower neck and axillae: No enlarged supraclavicular nodes are present.  No actionable nodule is present in the imaged portion of the thyroid  lobes. No axillary lymphadenopathy.    Heart: The heart is enlarged. Unchanged small pericardial fluid.    Mediastinum and sonia: No mediastinal mass is present. Prominent  mediastinal lymph nodes are present, likely reactive.     Vessels: Normal caliber of the aorta and main pulmonary artery.  Scattered scattered atherosclerotic calcifications of the thoracic  aorta and coronary arteries.    Airways: The central tracheobronchial tree is clear.    Lungs: Stable postsurgical changes of bilateral lung transplant.  Intact clam shell sternotomy wires. Right basilar chest tube in place.  Similar-appearing loculated right-sided hydropneumothorax. No  significant change in the small loculated hydropneumothorax on the  right lung base, with air tracking along the fissure. Increased left  loculated hydropneumothorax in the apex and base with especially along  the mediastinum (series 5, image 27). No new focal consolidation.  Increased size of pleural-based lesion in the left upper lobe,,  measuring about 3 cm compared to 2.2 cm (series 3, image 14); apparent  increase in size possibly secondary to adjacent  atelectasis/consolidation.     Upper abdomen: Limited  evaluation but small volume of ascites remains  in the visualized upper abdomen.     Bones: No acute or aggressive osseous abnormality.    Soft tissues: Small amount of subcutaneous emphysema along the right  chest wall.      Impression    IMPRESSION:     1. Increased loculated fluid in the left hemithorax with specks of air  density in the loculated fluid, especially increased loculation along  the  mediastinum, compared to CT 7/18/2022.  2. Similar appearing loculated right hydropneumothorax compared to  prior CT 7/18/2022 with minimal decrease in fluid component.  Right-sided chest tube appears to be outside the loculated  hydropneumothorax  3. Apparent increased size of pleural-based lesion in the left upper  lobe, possibly secondary to associated consolidation/atelectasis.  Recommend attention on follow-up.  4. Partially visualized ascites.  5. Mild subcutaneous emphysema along the right chest tube with minimal  subcutaneous emphysema along the tract of removed left chest tube.  6. Few prominent mediastinal lymph nodes likely reactive. Consider  attention on follow-up.    I have personally reviewed the examination and initial interpretation  and I agree with the findings.    NIKOS JAVED MD         SYSTEM ID:  [id number]   XR Chest Port 1 View    Narrative    Exam:  Chest X-ray 7/24/2022 9:05 AM    History: Right basilar chest tube s/p lung transplant    Comparison: 7/23/2022    Findings:   Single portable AP view of the chest. Enteric tube side-port is near  the diaphragm. Feeding tube is subdiaphragmatic with its tip below the  field-of-view. Stable right basilar chest tube.  Unchanged bilateral pleural effusions with loculation in the left  lateral aspect. Stable perihilar mass. No pneumothorax.      Impression    Impression:   1. Enteric tube side port is near the diaphragm. Recommend advancing.  2. Stable bilateral pleural effusions and left hilar mass.    KANDACE ROJAS MD         SYSTEM ID:  [id number]    XR Chest Port 1 View    Narrative    EXAM: XR CHEST PORT 1 VIEW  7/25/2022 10:27 AM    HISTORY: 62 years Female Right basilar chest tube s/p lung transplant.      COMPARISON: Chest radiograph 7/24/2022, chest CT 7/23/2022.    TECHNIQUE: Portable AP view of the chest.     FINDINGS:   Postsurgical changes of bilateral lung transplant. Clamshell  sternotomy wires appear intact. Right basilar chest tube appears  stable. Two enteric tubes course below the level of the diaphragm and  below the field of view.    Midline trachea. Stable cardiomediastinal silhouette with silhouetting  of the left heart border. Similar appearance of right-sided pleural  effusion and moderate left sided pleural effusion with loculated left  lateral aspect. Increased left midlung linear opacity and  opacification of the left upper lung field. Similar appearance of  bilateral perihilar and left basilar hazy opacities.      Impression    IMPRESSION:   1.  Similar appearance of bilateral pleural effusions with left-sided  loculated lateral component.  2.  Increased left mid lung opacities with upper lung field  opacification, which may represent loculated pleural fluid,  atelectasis, or infection.  3.  Bilateral perihilar and left basilar hazy opacities, likely  representing edema versus atelectasis.  4.  Postsurgical changes of bilateral lung transplant    I have personally reviewed the examination and initial interpretation  and I agree with the findings.    ALVINA HARRY MD         SYSTEM ID:  [id number]

## 2022-07-25 NOTE — PROGRESS NOTES
Shift Summary: Vitals stable. Off BiPAP during the day time. Last blood gas.   Last Blood Gas:  pH Arterial   Date Value Ref Range Status   07/13/2022 7.38 7.35 - 7.45 Final     pCO2 Arterial   Date Value Ref Range Status   07/13/2022 78 (HH) 35 - 45 mm Hg Final     pO2 Arterial   Date Value Ref Range Status   07/13/2022 75 (L) 80 - 105 mm Hg Final     Bicarbonate Arterial   Date Value Ref Range Status   07/13/2022 46 (HH) 21 - 28 mmol/L Final     Base Excess/Deficit (+/-)   Date Value Ref Range Status   07/13/2022 18.5 (H) -9.0 - 1.8 mmol/L Final     L.pleural chest tube placed in IR. 400 ml output since arrival. Heparin gtt @900 units/hr. Recheck Heparin levels in am. Recheck ordered. Will continue to monitor.    Gil Cruz RN on 7/25/2022 at 6:12 PM

## 2022-07-25 NOTE — PLAN OF CARE
Major Shift Events: Managing GI symptoms a focus this shift- multiple watery stools with correlating abdominal cramping, PRN simethicone given x1. Intermittent nausea episodes, PRN zofran given x1. TF at goal, NG to LIS- output in flowsheets. Left lung coarse, 1L oxymask during day, tolerated BIPAP for 4hrs: 15/5 30% FiO2. Hemodynamically stable.  Plan: Potential chest tube placement today  For vital signs and complete assessments, please see documentation flowsheets.

## 2022-07-25 NOTE — CONSULTS
Lung transplant 6/28/22, respiratory failure on intermittent BiPAP with 1L oxymask during the day. CT 7/23 with loculated left apical effusion. IR consulted for chest tube placement targeting left upper, apical fluid. Labs have been ordered on the fluid. Should be done in CT. Keep NPO in case of sedation, though this has potential to be moved to tomorrow if IR resources cannot accommodate this afternoon.    Rakesh Dewey PA-C  Interventional Radiology  [phone]

## 2022-07-25 NOTE — PROGRESS NOTES
Cardiovascular Surgery Progress Note  07/25/2022         Assessment and Plan:     Sofie is a 62 F PMH of oxygen-dependent COPD, HFpEF, HTN, HCV, and osteopenia who underwent bilateral lung transplant on 6/28/22 with Dr. Sunshine. This was complicated by left upper extremity acute limb ischemia s/p left radial thrombectomy on 7/1/22. Recovering renal function, will hold off on tunneled dialysis line at this time.       - Right Pleural tube remains to suction, elevated output but improving, can ambulate off suction. Keep tube today 7/25.    - Removed left pleural tube 7/20, she may have significant drainage from the tube sites. Has increasing loculated left pleural effusions, consider IR pigtail placement in lateral loculation.   - Has bilateral apical pleural effusions, lungs did not fill chest space per surgeon.     Discussed with Dr Sunshine through both written and verbal communication.      Andres Wilson PA-C  Cardiothoracic Surgery  Pager [phone]    7:27 AM   July 25, 2022

## 2022-07-25 NOTE — PROGRESS NOTES
Patient Name: Sofie Rodriguez  Medical Record Number: [id number]  Today's Date: 7/25/2022    Procedure: CT guided non-tunneled chest tube placement.  Proceduralist: Dr Pilo MARTINEZ  Pathology present: N/A    Procedure Start: 1453  Procedure end: 1502  Sedation medications administered: Local only     Report given to: Gil RITTER RN  : N/A    Other Notes: Pt arrived to IR room CT2 from 6D. Consent reviewed. Pt denies any questions or concerns regarding procedure. Pt positioned supine and monitored per protocol. 120 mls of fluid sent for labs as ordered.  Pt tolerated procedure without any noted complications. Pt transferred back to 6D.

## 2022-07-25 NOTE — PROGRESS NOTES
[facility]    Medicine Progress Note - Hospitalist Service, GOLD TEAM 10    Date of Admission:  6/28/2022    Assessment & Plan  Sofie Rodriguez is a 62 year old female admitted on 6/28/2022. She has PMH of end stage COPD s/p b/l lung transplant on 6/28/2022, HTN, HFpEF, h/o hepC, oteopenia, and former methamphetamine use, and she was transferred to [place] [id number] [facility] from MICU on 7/15/2022. She was admitted to the MICU on 7/13/20222 with prior hospital course complicated by left upper extremity acute limb ischemia s/p left radial thrombectomy on 7/1/2022. She was primarily treated for acute hypercapnic respiratory failure on intermittent BIPAP with worsening BACILIO while in the MICU. Breathing is improving, but patient has severely delayed gastric emptying found on NM study. PEGJ placement ordered, pending supply chain issue resolution.       TODAY  - Plan for PEGJ by IR on 7/27, needs heparin held for 4-6 hours prior to arrival, NPO before then with TFs vis NJ with NG to LIMS  - Heparin gtt ongoing, DOAC prior to discharge  - Continue to encourage OOB and up in chair  - BIPAP at night  - Follow-up pleural fluid studies for L Apical Fluid Collection, Pigtail by IR 7/25  at ~3PM  - Ongoing Simethicone for Gas / Cramping and Vancomycin for CDiff -> anticipate extended course given IS     End stage COPD s/p BOLT 6/28/2022  Acute hypercapnic hypoxic respiratory failure (improving), likely 2/2 decreased central respiratory drive vs respiratory muscle weakness and some pulmonary edema / fluid Transplanted 6/28. formal SNIFF test was performed on 7/14 showed R hemidiaphragm palsy. Of note transplanted lungs were also considered small for body size and could contribute to decreased respiratory compensation for hypercarbia. VBG relatively stable, most recent pCO2 74 (7/22).   - Continue BIPAP at night   - Simple mask w intermittent BIPAP for night and daytime naps; goal to keep O2sat  above 92%  - f/u myasthenia gravis panel  (pending from 7/14)  - oxycodone 2.5-5mg q4h PRN   - encourage activity and getting up in bed; PT/OT participation  - encourage chest physiotherapy QID    Immunosuppression:  - Prednisone 15mg AM, 12.5 PM  - Tacrolimus 5mg BID (trough goal 8-12)   -  BID  Prophylasxis:  - CMV - Valgancyclover  - Thrush - PO nysatin  - PJP - TMP-SMX (currently held given BACILIO); dapsone started 7/18 at 50mg qMWF (will try to increase to daily on 7/25 per pulm)     Pleural Effusion, Right and Left  - 1 R chest tube (basilar) in place currently (pericardial drain was removed 7/15, L basilar was removed 7/20). CT chest 7/14 showing unchanged bilateral effusions and unchanged biapical pneumothoraces with resolved left basilar pneumothorax; bibasilar chest tubes in place with pericardial drain (which was removed 7/15).   - daily CXR  - RIGHT Chest Tube - still with output  - LEFT Apical Fluid collection - IR targeting via CT guided pigtail placement     Hypotension, resolved  H/o HTN  H/o HFpEF  Likely vasoplegia post operatively. Last echo 7/7 normal EF with good LV function. S/p hydrocortisone 7/6-7/9 and fludrocortisone 7/6-7/11 without significant improvement.  - off midorine 7/19  - Holding PTA lasix 20mg daily     C.diff - vanco started 7/12, may need prolonged course given IS  Abdominal pain - due to volume / c diff / gastro paresis   Severe Gastroparesis - gastric emptying study 7/20  - PO vanc 125mg QID 7/12 to present  - gastric emptying study 7/20 showed delayed gastric emptying with retention of 95% of stomach contents after 4 hours; please keep patient NPO except tube feeds and meds  - Simethicone scheduled  - PEGJ planned for 7/27, heparin held     NJ tube  Feeding regimen:   - Adult Formula Drip Feeding: Continuous Novasource Renal; Nasojejunal; Goal Rate: 35; initiate at goal rate; mL/hr; Medication - Feeding Tube Flush Frequency: At least 15-30 mL water before and after  medication administration and with tube clogging     BACILIO  Hypermagnesemia  Hyperphosphatemia  Baseline renal function was normal prior to surgery. New onset renal failure after transplant, likely multifactorial due to pre-renal hypotension, less likely nephrotoxic agents. Overall kidney function improving. Today, Cr fluctuating but BUN increasing, with unclear urine output (7/22).   - HD cath removed 7/22, trend metabolites     Tachyarrthymia  Paroxysmal afib  Paroxysmal aflutter/AT  SVT vs afib.Had an occurrence during HD on 7/14. Had afib with RVR to 200s on 7/17. EP consult placed.asmyptomatic and stable during episodes. Aflutter episode (7/17) with transfer. Vagal maneuver responsive at time. No recent tachyarrhythmia episodes (7/22).   - Per EP: patient has had episodes of possible flutter (vs AT with 2:1 conduction) and afib with RVR  - heparin drip and transition to DOAC after PEGJ placement and chest tubes resolved (CHADS-VASc score of 2)  - On telemetry  - On metoprolol tartrate 25mg BID  - Discharge on ziopatch for 14 days with EP follow up in 1-2 months after     Stress-induced hyperglycemia  -200s over past day.   - on 15U glargine BID; continue today and re-evaluate as needed     Acute Blood Loss Anemia, stable  Initially from acute blood loss. Hb dropped to 6.8 on 7/14, requiring 1U pRBC. Post-transfusion Hb 9.4 > 8.3 > 8.6 > 8.7 (7/18).   - continue to monitor  - transfuse for hgb<7        Diet: Adult Formula Drip Feeding: Continuous Novasource Renal; Nasojejunal; Goal Rate: 35; initiate at 15 ml/hr and advance by 10 mL Q8H to goal; mL/hr; Medication - Feeding Tube Flush Frequency: At least 15-30 mL water before and after medication admin...  NPO per Anesthesia Guidelines for Procedure/Surgery Except for: Meds, Ice Chips, NPO but receiving Tube Feeding    DVT Prophylaxis: heparin  Dong Catheter: Not present  Central Lines: None  Cardiac Monitoring: None  Code Status: Full Code      Disposition  Plan        The patient's care was discussed with the Patient and Patient's Family.    Catalino Preciado DO  Hospitalist Service, GOLD TEAM [address] [place] [address]  Securely message with the Vocera Web Console (learn more here)  Text page via [facility] Paging/Directory   Please see signed in provider for up to date coverage information      Clinically Significant Risk Factors Present on Admission                      ______________________________________________________________________    Interval History   Walking the halls regularly now, no overnight events, states she is feeling well with some weakness and mild abdominal pain.    A 4 point review of systems evaluated including questioning patient about cardiovascular symptoms, gastrointestinal symptoms, respiratory symptoms, constitutional symptoms, and hematology / bleeding symptoms and all were negative except as noted in HPI above.    Data reviewed today: I reviewed all medications, new labs and imaging results over the last 24 hours. I personally reviewed no images or EKG's today.    Physical Exam   Vital Signs: Temp: 98.3  F (36.8  C) Temp src: Axillary BP: 130/67 Pulse: 92   Resp: 18 SpO2: 100 % O2 Device: Oxymask Oxygen Delivery: 2 LPM  Weight: 156 lbs 4.8 oz  General: non-distressed adult  HEENT: Normocephalic, atraumatic, pupils equal round reactive, membranes moist. HD cath RIJ, NGT, NJT  CV: normal capillary refill, heart regular rate and rhythm, tele  Respiratory: Non-labored breathing, bronchovesicular lung sounds, R Chest Tube noted, draining serosangiunous.   Abdominal: soft, mildly tender in the epigastrium, no rebound, no guarding, no rigidity, +bowel sounds  Genitourinary: no suprapubic tenderness  Musculoskeletal: normal bulk and tone  Skin: no rash, normal turgor  Neurologic: no facial droop, moving upper and lower extremities spontaneously, sensation in tact to light touch on extremities  Psychiatric: normal  mood and affect    Data   Recent Labs   Lab 07/25/22  1218 07/25/22  0751 07/25/22  0648 07/24/22  0817 07/24/22  0754 07/23/22  1242 07/23/22  1059 07/21/22  1211 07/21/22  0955   WBC  --   --  4.0  --  5.8  --  7.8   < > 9.2   HGB  --   --  7.3*  --  7.3*  --  8.0*   < > 8.4*   MCV  --   --  107*  --  106*  --  102*   < > 102*   PLT  --   --  267  --  256  --  256   < > 305   NA  --   --  143  --  142  --  140   < > 139   POTASSIUM  --   --  4.6  --  4.3  --  3.7   < > 3.7   CHLORIDE  --   --  93*  --  93*  --  93*   < > 95*   CO2  --   --  47*  --  45*  --  40*   < > 32*   BUN  --   --  91.1*  --  95.9*  --  88.6*   < > 89.3*   CR  --   --  1.20*  --  1.32*  --  1.24*   < > 1.46*   ANIONGAP  --   --  3*  --  4*  --  7   < > 12   ESTUARDO  --   --  9.5  --  9.7  --  9.3   < > 8.6*   * 167* 172*   < > 144*   < > 151*   < > 164*   ALBUMIN  --   --  2.6*  --   --   --   --   --  2.8*   PROTTOTAL  --   --  4.7*  --  4.6*  --   --   --  4.9*   BILITOTAL  --   --  0.2  --   --   --   --   --  0.2   ALKPHOS  --   --  61  --   --   --   --   --  74   ALT  --   --  15  --   --   --   --   --  18   AST  --   --  19  --   --   --   --   --  17    < > = values in this interval not displayed.

## 2022-07-25 NOTE — PROCEDURES
[facility]    Procedure: IR Procedure Note    Date/Time: 7/25/2022 3:13 PM  Performed by: Alexander Daigle MD  Authorized by: Alexander Daigle MD       UNIVERSAL PROTOCOL   Site Marked: NA  Prior Images Obtained and Reviewed:  Yes  Required items: Required blood products, implants, devices and special equipment available    Patient identity confirmed:  Verbally with patient, arm band, provided demographic data and hospital-assigned identification number  NA - No sedation, light sedation, or local anesthesia  Confirmation Checklist:  Patient's identity using two indicators, relevant allergies, procedure was appropriate and matched the consent or emergent situation and correct equipment/implants were available  Time out: Immediately prior to the procedure a time out was called    Universal Protocol: the Joint Commission Universal Protocol was followed    Preparation: Patient was prepped and draped in usual sterile fashion    ESBL (mL):  1     ANESTHESIA    Anesthesia: Local infiltration  Local Anesthetic:  Lidocaine 1% without epinephrine  Anesthetic Total (mL):  10      SEDATION    Patient Sedated: No    Vital signs: Vital signs monitored during sedation    See dictated procedure note for full details.  Findings: 12 [employer] Flexima APD non locking J Tip catheter placed as left anterior apical chest tube under CT guidance. Approximately 100 mL aspirated and submitted for the requested labs. Catheter to water seal chest drainage.    Specimens: none    Complications: None    Condition: Stable      PROCEDURE    Patient Tolerance:  Patient tolerated the procedure well with no immediate complications  Length of time physician/provider present for 1:1 monitoring during sedation: 0

## 2022-07-26 ENCOUNTER — APPOINTMENT (OUTPATIENT)
Dept: PHYSICAL THERAPY | Facility: CLINIC | Age: 62
DRG: 007 | End: 2022-07-26
Attending: THORACIC SURGERY (CARDIOTHORACIC VASCULAR SURGERY)
Payer: MEDICARE

## 2022-07-26 ENCOUNTER — APPOINTMENT (OUTPATIENT)
Dept: GENERAL RADIOLOGY | Facility: CLINIC | Age: 62
DRG: 007 | End: 2022-07-26
Attending: PHYSICIAN ASSISTANT
Payer: MEDICARE

## 2022-07-26 LAB
ANION GAP SERPL CALCULATED.3IONS-SCNC: 3 MMOL/L (ref 7–15)
BASE EXCESS BLDV CALC-SCNC: 26.3 MMOL/L (ref -7.7–1.9)
BASOPHILS # BLD AUTO: 0 10E3/UL (ref 0–0.2)
BASOPHILS NFR BLD AUTO: 0 %
BUN SERPL-MCNC: 89.2 MG/DL (ref 8–23)
CALCIUM SERPL-MCNC: 9.5 MG/DL (ref 8.8–10.2)
CHLORIDE SERPL-SCNC: 90 MMOL/L (ref 98–107)
CREAT SERPL-MCNC: 1.25 MG/DL (ref 0.51–0.95)
DEPRECATED HCO3 PLAS-SCNC: 47 MMOL/L (ref 22–29)
EOSINOPHIL # BLD AUTO: 0.2 10E3/UL (ref 0–0.7)
EOSINOPHIL NFR BLD AUTO: 5 %
ERYTHROCYTE [DISTWIDTH] IN BLOOD BY AUTOMATED COUNT: 17 % (ref 10–15)
GFR SERPL CREATININE-BSD FRML MDRD: 49 ML/MIN/1.73M2
GLUCOSE BLDC GLUCOMTR-MCNC: 118 MG/DL (ref 70–99)
GLUCOSE BLDC GLUCOMTR-MCNC: 138 MG/DL (ref 70–99)
GLUCOSE BLDC GLUCOMTR-MCNC: 159 MG/DL (ref 70–99)
GLUCOSE BLDC GLUCOMTR-MCNC: 160 MG/DL (ref 70–99)
GLUCOSE BLDC GLUCOMTR-MCNC: 162 MG/DL (ref 70–99)
GLUCOSE BLDC GLUCOMTR-MCNC: 168 MG/DL (ref 70–99)
GLUCOSE BLDC GLUCOMTR-MCNC: 196 MG/DL (ref 70–99)
GLUCOSE SERPL-MCNC: 166 MG/DL (ref 70–99)
HCO3 BLDV-SCNC: 53 MMOL/L (ref 21–28)
HCT VFR BLD AUTO: 23.8 % (ref 35–47)
HGB BLD-MCNC: 7.1 G/DL (ref 11.7–15.7)
IMM GRANULOCYTES # BLD: 0 10E3/UL
IMM GRANULOCYTES NFR BLD: 1 %
LACTATE SERPL-SCNC: 0.5 MMOL/L (ref 0.7–2)
LYMPHOCYTES # BLD AUTO: 0.2 10E3/UL (ref 0.8–5.3)
LYMPHOCYTES NFR BLD AUTO: 4 %
MAGNESIUM SERPL-MCNC: 2.3 MG/DL (ref 1.7–2.3)
MCH RBC QN AUTO: 31.6 PG (ref 26.5–33)
MCHC RBC AUTO-ENTMCNC: 29.8 G/DL (ref 31.5–36.5)
MCV RBC AUTO: 106 FL (ref 78–100)
MONOCYTES # BLD AUTO: 0.3 10E3/UL (ref 0–1.3)
MONOCYTES NFR BLD AUTO: 9 %
NEUTROPHILS # BLD AUTO: 2.7 10E3/UL (ref 1.6–8.3)
NEUTROPHILS NFR BLD AUTO: 81 %
NRBC # BLD AUTO: 0 10E3/UL
NRBC BLD AUTO-RTO: 0 /100
O2/TOTAL GAS SETTING VFR VENT: 30 %
PCO2 BLDV: 76 MM HG (ref 40–50)
PH BLDV: 7.45 [PH] (ref 7.32–7.43)
PHOSPHATE SERPL-MCNC: 3.8 MG/DL (ref 2.5–4.5)
PLATELET # BLD AUTO: 234 10E3/UL (ref 150–450)
PO2 BLDV: 50 MM HG (ref 25–47)
POTASSIUM SERPL-SCNC: 4.3 MMOL/L (ref 3.4–5.3)
RBC # BLD AUTO: 2.25 10E6/UL (ref 3.8–5.2)
SODIUM SERPL-SCNC: 140 MMOL/L (ref 136–145)
UFH PPP CHRO-ACNC: 0.25 IU/ML
WBC # BLD AUTO: 3.4 10E3/UL (ref 4–11)

## 2022-07-26 PROCEDURE — 85520 HEPARIN ASSAY: CPT | Performed by: INTERNAL MEDICINE

## 2022-07-26 PROCEDURE — 36415 COLL VENOUS BLD VENIPUNCTURE: CPT | Performed by: STUDENT IN AN ORGANIZED HEALTH CARE EDUCATION/TRAINING PROGRAM

## 2022-07-26 PROCEDURE — 71045 X-RAY EXAM CHEST 1 VIEW: CPT

## 2022-07-26 PROCEDURE — 83605 ASSAY OF LACTIC ACID: CPT | Performed by: INTERNAL MEDICINE

## 2022-07-26 PROCEDURE — 250N000012 HC RX MED GY IP 250 OP 636 PS 637: Performed by: INTERNAL MEDICINE

## 2022-07-26 PROCEDURE — 250N000013 HC RX MED GY IP 250 OP 250 PS 637: Performed by: STUDENT IN AN ORGANIZED HEALTH CARE EDUCATION/TRAINING PROGRAM

## 2022-07-26 PROCEDURE — 82310 ASSAY OF CALCIUM: CPT | Performed by: STUDENT IN AN ORGANIZED HEALTH CARE EDUCATION/TRAINING PROGRAM

## 2022-07-26 PROCEDURE — 94668 MNPJ CHEST WALL SBSQ: CPT

## 2022-07-26 PROCEDURE — 250N000009 HC RX 250: Performed by: SURGERY

## 2022-07-26 PROCEDURE — 71045 X-RAY EXAM CHEST 1 VIEW: CPT | Mod: 26 | Performed by: RADIOLOGY

## 2022-07-26 PROCEDURE — 250N000013 HC RX MED GY IP 250 OP 250 PS 637: Performed by: SURGERY

## 2022-07-26 PROCEDURE — 250N000012 HC RX MED GY IP 250 OP 636 PS 637: Performed by: PHYSICIAN ASSISTANT

## 2022-07-26 PROCEDURE — 250N000013 HC RX MED GY IP 250 OP 250 PS 637

## 2022-07-26 PROCEDURE — 250N000013 HC RX MED GY IP 250 OP 250 PS 637: Performed by: NURSE PRACTITIONER

## 2022-07-26 PROCEDURE — 83735 ASSAY OF MAGNESIUM: CPT | Performed by: STUDENT IN AN ORGANIZED HEALTH CARE EDUCATION/TRAINING PROGRAM

## 2022-07-26 PROCEDURE — 250N000013 HC RX MED GY IP 250 OP 250 PS 637: Performed by: HOSPITALIST

## 2022-07-26 PROCEDURE — 85025 COMPLETE CBC W/AUTO DIFF WBC: CPT | Performed by: STUDENT IN AN ORGANIZED HEALTH CARE EDUCATION/TRAINING PROGRAM

## 2022-07-26 PROCEDURE — 94640 AIRWAY INHALATION TREATMENT: CPT | Mod: 76

## 2022-07-26 PROCEDURE — 84100 ASSAY OF PHOSPHORUS: CPT | Performed by: STUDENT IN AN ORGANIZED HEALTH CARE EDUCATION/TRAINING PROGRAM

## 2022-07-26 PROCEDURE — 120N000002 HC R&B MED SURG/OB UMMC

## 2022-07-26 PROCEDURE — 97530 THERAPEUTIC ACTIVITIES: CPT | Mod: GP

## 2022-07-26 PROCEDURE — 94640 AIRWAY INHALATION TREATMENT: CPT

## 2022-07-26 PROCEDURE — 99233 SBSQ HOSP IP/OBS HIGH 50: CPT | Mod: 24 | Performed by: PHYSICIAN ASSISTANT

## 2022-07-26 PROCEDURE — 250N000011 HC RX IP 250 OP 636: Performed by: STUDENT IN AN ORGANIZED HEALTH CARE EDUCATION/TRAINING PROGRAM

## 2022-07-26 PROCEDURE — 99233 SBSQ HOSP IP/OBS HIGH 50: CPT | Performed by: HOSPITALIST

## 2022-07-26 PROCEDURE — 82803 BLOOD GASES ANY COMBINATION: CPT | Performed by: STUDENT IN AN ORGANIZED HEALTH CARE EDUCATION/TRAINING PROGRAM

## 2022-07-26 PROCEDURE — 250N000013 HC RX MED GY IP 250 OP 250 PS 637: Performed by: THORACIC SURGERY (CARDIOTHORACIC VASCULAR SURGERY)

## 2022-07-26 PROCEDURE — 94660 CPAP INITIATION&MGMT: CPT

## 2022-07-26 PROCEDURE — 999N000157 HC STATISTIC RCP TIME EA 10 MIN

## 2022-07-26 PROCEDURE — 250N000011 HC RX IP 250 OP 636

## 2022-07-26 PROCEDURE — 36415 COLL VENOUS BLD VENIPUNCTURE: CPT | Performed by: INTERNAL MEDICINE

## 2022-07-26 RX ORDER — MYCOPHENOLATE MOFETIL 200 MG/ML
500 POWDER, FOR SUSPENSION ORAL 2 TIMES DAILY
Status: DISCONTINUED | OUTPATIENT
Start: 2022-07-26 | End: 2022-08-05

## 2022-07-26 RX ORDER — VANCOMYCIN HYDROCHLORIDE 50 MG/ML
125 KIT ORAL EVERY 6 HOURS SCHEDULED
Status: DISPENSED | OUTPATIENT
Start: 2022-07-26 | End: 2022-07-28

## 2022-07-26 RX ADMIN — LEVALBUTEROL HYDROCHLORIDE 1.25 MG: 1.25 SOLUTION RESPIRATORY (INHALATION) at 21:34

## 2022-07-26 RX ADMIN — INSULIN GLARGINE 15 UNITS: 100 INJECTION, SOLUTION SUBCUTANEOUS at 20:23

## 2022-07-26 RX ADMIN — THERA TABS 1 TABLET: TAB at 13:00

## 2022-07-26 RX ADMIN — ACETYLCYSTEINE 2 ML: 200 SOLUTION ORAL; RESPIRATORY (INHALATION) at 10:08

## 2022-07-26 RX ADMIN — NYSTATIN 1000000 UNITS: 100000 SUSPENSION ORAL at 13:03

## 2022-07-26 RX ADMIN — INSULIN GLARGINE 15 UNITS: 100 INJECTION, SOLUTION SUBCUTANEOUS at 08:50

## 2022-07-26 RX ADMIN — HEPARIN SODIUM 900 UNITS/HR: 10000 INJECTION, SOLUTION INTRAVENOUS at 15:33

## 2022-07-26 RX ADMIN — VANCOMYCIN HYDROCHLORIDE 125 MG: KIT at 01:54

## 2022-07-26 RX ADMIN — ACETAMINOPHEN 975 MG: 325 TABLET, FILM COATED ORAL at 08:41

## 2022-07-26 RX ADMIN — CALCIUM CARBONATE 600 MG (1,500 MG)-VITAMIN D3 400 UNIT TABLET 1 TABLET: at 09:18

## 2022-07-26 RX ADMIN — VANCOMYCIN HYDROCHLORIDE 125 MG: KIT at 20:22

## 2022-07-26 RX ADMIN — Medication 1 PACKET: at 12:46

## 2022-07-26 RX ADMIN — VALGANCICLOVIR HYDROCHLORIDE 450 MG: 50 POWDER, FOR SOLUTION ORAL at 08:45

## 2022-07-26 RX ADMIN — ONDANSETRON 4 MG: 4 TABLET, ORALLY DISINTEGRATING ORAL at 08:43

## 2022-07-26 RX ADMIN — ASPIRIN 81 MG CHEWABLE TABLET 81 MG: 81 TABLET CHEWABLE at 09:17

## 2022-07-26 RX ADMIN — Medication 5 MG: at 10:28

## 2022-07-26 RX ADMIN — METOPROLOL TARTRATE 25 MG: 25 TABLET, FILM COATED ORAL at 20:22

## 2022-07-26 RX ADMIN — MYCOPHENOLATE MOFETIL 500 MG: 200 POWDER, FOR SUSPENSION ORAL at 20:23

## 2022-07-26 RX ADMIN — ACETAMINOPHEN 975 MG: 325 TABLET, FILM COATED ORAL at 20:22

## 2022-07-26 RX ADMIN — PREDNISONE 15 MG: 5 TABLET ORAL at 08:42

## 2022-07-26 RX ADMIN — Medication 5 MG: at 06:08

## 2022-07-26 RX ADMIN — ACETYLCYSTEINE 2 ML: 200 SOLUTION ORAL; RESPIRATORY (INHALATION) at 14:39

## 2022-07-26 RX ADMIN — Medication 5 MG: at 15:03

## 2022-07-26 RX ADMIN — CALCIUM CARBONATE 600 MG (1,500 MG)-VITAMIN D3 400 UNIT TABLET 1 TABLET: at 17:40

## 2022-07-26 RX ADMIN — LEVALBUTEROL HYDROCHLORIDE 1.25 MG: 1.25 SOLUTION RESPIRATORY (INHALATION) at 17:20

## 2022-07-26 RX ADMIN — LEVALBUTEROL HYDROCHLORIDE 1.25 MG: 1.25 SOLUTION RESPIRATORY (INHALATION) at 14:40

## 2022-07-26 RX ADMIN — VANCOMYCIN HYDROCHLORIDE 125 MG: KIT at 13:06

## 2022-07-26 RX ADMIN — INSULIN ASPART 1 UNITS: 100 INJECTION, SOLUTION INTRAVENOUS; SUBCUTANEOUS at 15:07

## 2022-07-26 RX ADMIN — Medication 40 MG: at 20:23

## 2022-07-26 RX ADMIN — ACETAMINOPHEN 975 MG: 325 TABLET, FILM COATED ORAL at 13:00

## 2022-07-26 RX ADMIN — VANCOMYCIN HYDROCHLORIDE 125 MG: KIT at 08:45

## 2022-07-26 RX ADMIN — NYSTATIN 1000000 UNITS: 100000 SUSPENSION ORAL at 17:42

## 2022-07-26 RX ADMIN — MYCOPHENOLATE MOFETIL 500 MG: 200 POWDER, FOR SUSPENSION ORAL at 09:30

## 2022-07-26 RX ADMIN — LIDOCAINE PATCH 4% 2 PATCH: 40 PATCH TOPICAL at 00:16

## 2022-07-26 RX ADMIN — NYSTATIN: 100000 CREAM TOPICAL at 20:23

## 2022-07-26 RX ADMIN — TACROLIMUS 5.5 MG: 5 CAPSULE ORAL at 08:45

## 2022-07-26 RX ADMIN — Medication 5 MG: at 01:50

## 2022-07-26 RX ADMIN — NYSTATIN 1000000 UNITS: 100000 SUSPENSION ORAL at 22:16

## 2022-07-26 RX ADMIN — INSULIN ASPART 1 UNITS: 100 INJECTION, SOLUTION INTRAVENOUS; SUBCUTANEOUS at 04:08

## 2022-07-26 RX ADMIN — INSULIN ASPART 1 UNITS: 100 INJECTION, SOLUTION INTRAVENOUS; SUBCUTANEOUS at 23:37

## 2022-07-26 RX ADMIN — INSULIN ASPART 3 UNITS: 100 INJECTION, SOLUTION INTRAVENOUS; SUBCUTANEOUS at 20:23

## 2022-07-26 RX ADMIN — Medication 40 MG: at 09:30

## 2022-07-26 RX ADMIN — METOPROLOL TARTRATE 25 MG: 25 TABLET, FILM COATED ORAL at 08:43

## 2022-07-26 RX ADMIN — NYSTATIN: 100000 CREAM TOPICAL at 09:22

## 2022-07-26 RX ADMIN — LEVALBUTEROL HYDROCHLORIDE 1.25 MG: 1.25 SOLUTION RESPIRATORY (INHALATION) at 10:08

## 2022-07-26 RX ADMIN — PREDNISONE 12.5 MG: 2.5 TABLET ORAL at 20:22

## 2022-07-26 RX ADMIN — NYSTATIN 1000000 UNITS: 100000 SUSPENSION ORAL at 08:45

## 2022-07-26 RX ADMIN — ACETYLCYSTEINE 2 ML: 200 SOLUTION ORAL; RESPIRATORY (INHALATION) at 17:20

## 2022-07-26 RX ADMIN — THIAMINE HCL TAB 100 MG 100 MG: 100 TAB at 09:18

## 2022-07-26 RX ADMIN — TACROLIMUS 5.5 MG: 5 CAPSULE ORAL at 17:40

## 2022-07-26 RX ADMIN — INSULIN ASPART 2 UNITS: 100 INJECTION, SOLUTION INTRAVENOUS; SUBCUTANEOUS at 08:49

## 2022-07-26 RX ADMIN — ACETYLCYSTEINE 2 ML: 200 SOLUTION ORAL; RESPIRATORY (INHALATION) at 21:34

## 2022-07-26 ASSESSMENT — ACTIVITIES OF DAILY LIVING (ADL)
ADLS_ACUITY_SCORE: 30

## 2022-07-26 NOTE — PLAN OF CARE
Neuro: A&Ox4.   Cardiac: SR/ST - HR 80-100s. VSS. Afebrile.   Respiratory: Sating >92% on 0.5L NC. Attempted to wean pt to RA but would desaturate into the 80s. BiPAP 30% FiO2 overnight.   GI/: Unable to measure all of urine as it is mixed with stool. Loose/watery/brown BM x2 this shift. Denies N/V.   Diet/appetite: TF at goal rate of 35ml/hr via NJ. FWF 30cc Q4hr. Per provider, plan to hold TF and meds after midnight for procedure tomorrow.   Activity:  Assist of 1, up to chair and BSC.  Pain: Pain at chest tube site - managed with prn oxycodone.   Skin: No new deficits noted.  LDA's: R PIV infusing heparin. CT x2 to -20 suction. NG to LIS. NJ w/ TF.     Plan: Plan for PEG/J tube placement tomorrow. Continue with POC. Notify primary team with changes.     Problem: Respiratory Compromise (Lung Surgery)  Goal: Effective Oxygenation and Ventilation  Intervention: Optimize Oxygenation and Ventilation  Recent Flowsheet Documentation  Taken 7/26/2022 1600 by Sally Bowens RN  Administration (IS): self-administered  Chest Tube Safety: all connections secured  Head of Bed (HOB) Positioning: HOB at 30-45 degrees  Taken 7/26/2022 1200 by Sally Bowens RN  Administration (IS): self-administered  Chest Tube Safety: all connections secured  Head of Bed (HOB) Positioning: HOB at 30-45 degrees  Taken 7/26/2022 0800 by Sally Bowens, RN  Chest Tube Safety: all connections secured

## 2022-07-26 NOTE — PROGRESS NOTES
[facility]    Medicine Progress Note - Hospitalist Service, GOLD TEAM 10    Date of Admission:  6/28/2022    Assessment & Plan  Sofie Rodriguez is a 62 year old female admitted on 6/28/2022. She has PMH of end stage COPD s/p b/l lung transplant on 6/28/2022, HTN, HFpEF, h/o hepC, oteopenia, and former methamphetamine use, and she was transferred to [place] [id number] [facility] from MICU on 7/15/2022. She was admitted to the MICU on 7/13/20222 with prior hospital course complicated by left upper extremity acute limb ischemia s/p left radial thrombectomy on 7/1/2022. She was primarily treated for acute hypercapnic respiratory failure on intermittent BIPAP with worsening BACILIO while in the MICU. Breathing is improving, but patient has severely delayed gastric emptying found on NM study. PEGJ placement ordered, pending supply chain issue resolution.       Today's Plan:  - Await PEGJ by IR on 7/27, needs heparin held for 4-6 hours prior to arrival, NPO before then with TFs vis NJ with NG to LIMS  - Heparin gtt ongoing, DOAC prior to discharge  - Continue to encourage OOB and up in chair  - [facility]AP at [facility]  - Follow-up pleural fluid studies for L Apical Fluid Collection, Pigtail by IR 7/25  at ~3PM  - Ongoing Simethicone for Gas / Cramping and Vancomycin for CDiff -> anticipate extended course given IS     End stage COPD s/p BOLT 6/28/2022  Acute hypercapnic hypoxic respiratory failure (improving), likely 2/2 decreased central respiratory drive vs respiratory muscle weakness and some pulmonary edema / fluid Transplanted 6/28. formal SNIFF test was performed on 7/14 showed R hemidiaphragm palsy. Of note transplanted lungs were also considered small for body size and could contribute to decreased respiratory compensation for hypercarbia. VBG relatively stable, most recent pCO2 74 (7/22).   - Continue BIPAP at night   - Simple mask w intermittent BIPAP for night and daytime naps; goal to keep  O2sat above 92%  - f/u myasthenia gravis panel  (pending from 7/14)  - oxycodone 2.5-5mg q4h PRN   - encourage activity and getting up in bed; PT/OT participation  - encourage chest physiotherapy QID    Immunosuppression:  - Prednisone 15mg AM, 12.5 PM  - Tacrolimus 5mg BID (trough goal 8-12)   -  BID  Prophylasxis:  - CMV - Valgancyclover  - Thrush - PO nysatin  - PJP - TMP-SMX (currently held given BACILIO); dapsone started 7/18 at 50mg qMWF (will try to increase to daily on 7/25 per pulm)     Pleural Effusion, Right and Left  - 1 R chest tube (basilar) in place currently (pericardial drain was removed 7/15, L basilar was removed 7/20). CT chest 7/14 showing unchanged bilateral effusions and unchanged biapical pneumothoraces with resolved left basilar pneumothorax; bibasilar chest tubes in place with pericardial drain (which was removed 7/15).   - daily CXR  - RIGHT Chest Tube - still with output  - LEFT Apical Fluid collection - IR targeting via CT guided pigtail placement     Hypotension, resolved  H/o HTN  H/o HFpEF  Likely vasoplegia post operatively. Last echo 7/7 normal EF with good LV function. S/p hydrocortisone 7/6-7/9 and fludrocortisone 7/6-7/11 without significant improvement.  - off midorine 7/19  - Holding PTA lasix 20mg daily     C.diff - vanco started 7/12, may need prolonged course given IS  Abdominal pain - due to volume / c diff / gastro paresis   Severe Gastroparesis - gastric emptying study 7/20  - PO vanc 125mg QID 7/12 to present  - gastric emptying study 7/20 showed delayed gastric emptying with retention of 95% of stomach contents after 4 hours; please keep patient NPO except tube feeds and meds  - Simethicone scheduled  - PEGJ planned for 7/27, heparin held     NJ tube  Feeding regimen:   - Adult Formula Drip Feeding: Continuous Novasource Renal; Nasojejunal; Goal Rate: 35; initiate at goal rate; mL/hr; Medication - Feeding Tube Flush Frequency: At least 15-30 mL water before and after  medication administration and with tube clogging     BACILIO  Hypermagnesemia  Hyperphosphatemia  Baseline renal function was normal prior to surgery. New onset renal failure after transplant, likely multifactorial due to pre-renal hypotension, less likely nephrotoxic agents. Overall kidney function improving. Today, Cr fluctuating but BUN increasing, with unclear urine output (7/22).   - HD cath removed 7/22, trend metabolites     Tachyarrthymia  Paroxysmal afib  Paroxysmal aflutter/AT  SVT vs afib.Had an occurrence during HD on 7/14. Had afib with RVR to 200s on 7/17. EP consult placed.asmyptomatic and stable during episodes. Aflutter episode (7/17) with transfer. Vagal maneuver responsive at time. No recent tachyarrhythmia episodes (7/22).   - Per EP: patient has had episodes of possible flutter (vs AT with 2:1 conduction) and afib with RVR  - heparin drip and transition to DOAC after PEGJ placement and chest tubes resolved (CHADS-VASc score of 2)  - On telemetry  - On metoprolol tartrate 25mg BID  - Discharge on ziopatch for 14 days with EP follow up in 1-2 months after     Stress-induced hyperglycemia  -200s over past day.   - on 15U glargine BID; continue today and re-evaluate as needed     Acute Blood Loss Anemia, stable  Initially from acute blood loss. Hb dropped to 6.8 on 7/14, requiring 1U pRBC. Post-transfusion Hb 9.4 > 8.3 > 8.6 > 8.7 (7/18).   - continue to monitor  - transfuse for hgb<7        Diet: Adult Formula Drip Feeding: Continuous Novasource Renal; Nasojejunal; Goal Rate: 35; initiate at 15 ml/hr and advance by 10 mL Q8H to goal; mL/hr; Medication - Feeding Tube Flush Frequency: At least 15-30 mL water before and after medication admin...  NPO per Anesthesia Guidelines for Procedure/Surgery Except for: Meds, Ice Chips, NPO but receiving Tube Feeding    DVT Prophylaxis: heparin  Dong Catheter: Not present  Central Lines: None  Cardiac Monitoring: None  Code Status: Full Code      Disposition  Plan        The patient's care was discussed with the Patient and Patient's Family.    Catalino Pantoja MD  Hospitalist Service, GOLD TEAM [address] [place] [address]  Securely message with the Vocera Web Console (learn more here)  Text page via [facility] Paging/Directory   Please see signed in provider for up to date coverage information      Clinically Significant Risk Factors Present on Admission                      ______________________________________________________________________    Interval History   Seen today for follow up: Post lung transplant, pleural effusion, chest tubes    No acute overnight events per patient or patient's family.    Pt reports some L sided chest pain at the L chest tube site  Reports breathing is about the same as prior, not worse    As of today/at time of my visit:  Patient denies any headache, sore throat  Patient denies any sleeping disturbance  Patient denies any nausea or vomiting  Patient reports no abdominal pain  Patient denies any chest pain  Patient reports no arm or leg edema      Data reviewed today: I reviewed all medications, new labs and imaging results over the last 24 hours. I personally reviewed no images or EKG's today.    Physical Exam   Vital Signs: Temp: 98.7  F (37.1  C) Temp src: Oral BP: 115/60 Pulse: 103   Resp: 17 SpO2: 100 % O2 Device: Oxymask Oxygen Delivery: 1/2 LPM  Weight: 163 lbs 2.25 oz  General: non-distressed adult  HEENT: Normocephalic, atraumatic, pupils equal round reactive, membranes moist. NGT, NJT  CV: normal capillary refill, heart regular rate and rhythm, tele  Respiratory: Non-labored breathing, bronchovesicular lung sounds, R Chest Tube noted, draining serosangiunous. L apical chest tube in place  Abdominal: soft, mildly tender in the epigastrium, no rebound, no guarding, no rigidity, +bowel sounds  Genitourinary: no suprapubic tenderness  Musculoskeletal: normal bulk and tone  Skin: no rash, normal  turgor  Neurologic: no facial droop, moving upper and lower extremities spontaneously, sensation in tact to light touch on extremities  Psychiatric: normal mood and affect    Data   Recent Labs   Lab 07/26/22  1507 07/26/22  1245 07/26/22  0735 07/25/22  0751 07/25/22  0648 07/24/22  0817 07/24/22  0754 07/21/22  1211 07/21/22  0955   WBC  --   --  3.4*  --  4.0  --  5.8   < > 9.2   HGB  --   --  7.1*  --  7.3*  --  7.3*   < > 8.4*   MCV  --   --  106*  --  107*  --  106*   < > 102*   PLT  --   --  234  --  267  --  256   < > 305   NA  --   --  140  --  143  --  142   < > 139   POTASSIUM  --   --  4.3  --  4.6  --  4.3   < > 3.7   CHLORIDE  --   --  90*  --  93*  --  93*   < > 95*   CO2  --   --  47*  --  47*  --  45*   < > 32*   BUN  --   --  89.2*  --  91.1*  --  95.9*   < > 89.3*   CR  --   --  1.25*  --  1.20*  --  1.32*   < > 1.46*   ANIONGAP  --   --  3*  --  3*  --  4*   < > 12   ESTUARDO  --   --  9.5  --  9.5  --  9.7   < > 8.6*   * 118* 166*   < > 172*   < > 144*   < > 164*   ALBUMIN  --   --   --   --  2.6*  --   --   --  2.8*   PROTTOTAL  --   --   --   --  4.7*  --  4.6*  --  4.9*   BILITOTAL  --   --   --   --  0.2  --   --   --  0.2   ALKPHOS  --   --   --   --  61  --   --   --  74   ALT  --   --   --   --  15  --   --   --  18   AST  --   --   --   --  19  --   --   --  17    < > = values in this interval not displayed.

## 2022-07-26 NOTE — PROGRESS NOTES
"  BRIEF CVTS PROGRESS NOTE    S:  Sofie Rodriguez is a 62 year old female with PMH significant for oxygen-dependent COPD, HF, HTN, HCV, osteopenia, and methamphetamine abuse in remission.  She is now s/p BSLT by Dr. Sunshine on 6/28/2022.  Her post-operative course has been complicated by LUE critical limb ischemia now s/p left radial thrombectomy on 7/1/2022, hypercapneic respiratory insufficiency, BACILIO, and dysphagia.    Using bipap at night, denies dyspnea, complains of being \"a little sore\" today - mainly at the left chest tube site.    O:  /63 (BP Location: Right arm, Cuff Size: Adult Regular)   Pulse 98   Temp 98.7  F (37.1  C) (Oral)   Resp 18   Ht 1.575 m (5' 2\")   Wt 74 kg (163 lb 2.3 oz)   SpO2 98%   BMI 29.84 kg/m       Gen:  Deconditioned but NAD  Neuro:  Alert and grossly oriented, speech clear  CV:  Warm and well-perfused, regular SR on monitor, 1+ pitting LE edema  Pulm:  Unlabored breathing on supplemental oxygen via oxiplus mask, right basilar and left apical chest tubes in place with serous output, no air leak  GI:  Nasal SBFT in place  MSK:  Generalized deconditioning, no gross deformities    A/P:  S/p BLST on 6/28/2022, post op course c/b LUE critical limb ischemia now s/p left radial thrombectomy on 7/1/2022, hypercapneic respiratory insufficiency, BACILIO, and dysphagia.     - Continue surgical R basilar pleural chest tube to suction until output <200ml per day; OK to water seal for ambulation or when out of room   - IR-placed left apical pigtail chest tube on 7/25; management per Pulmonology   - Monitor loculated left lateral pleural effusion; defer IR/thoracentesis consultation at this time as volume likely too small to justify drainage   - Monitor for drainage from surgical chest tube sites - may be left open to air once no longer draining   - Per surgeon, lungs did not fill chest space   - Daily wound care per nursing:  Wound cleanser to clamshell incision, pat dry, may be left open to " air; keep incision C/D/I   - Consider diuresis as hemodynamics and renal function will allow in effort to dry up chest tube output   - Remainder of plan per Pulm Transplant/Medicine teams    CVTS will continue to follow for surgical chest tube and clamshell incision management.    Dr. Sunshine updated via written communication.    Deyanira Zendejas, CNP, ACN-AG  Cardiothoracic Surgery  Pager [phone]

## 2022-07-26 NOTE — PROGRESS NOTES
Pulmonary Medicine  Cystic Fibrosis - Lung Transplant Team  Progress Note  2022       Patient: Sofie Rodriguez  MRN: [id number]  : 1960 (age 62 year old)  Transplant: 2022 (Lung), POD#28  Admission date: 2022    Assessment & Plan:     Sofie Rodriguez is a 62 year old female with a PMH significant for end-stage COPD, HTN, HFpEF, Mycobacterium peregrinum colonization, h/o hepatitis C, HECTOR s/p LEEP procedure, osteopenia, and former methamphetamine use.  Pt. is now s/p BSLT on 22, lungs slightly undersized.   Persistent low dose pressor needs post-op through 7/10.  Extubated POD #2 but with persistent hypercapnia, mostly compensated and only slightly improved with intermittent BiPAP.  Also with left radial artery thrombus (presumed secondary to arterial line) s/p thrombectomy 7/, BACILIO, and C diff.  Rehabilitation and airway clearance initially limited by dysrhythmia and gastric discomfort, now with gradual improvement.  Remains on 1L oxymask during the day and BiPAP overnight. Weaned to RA on .       Today's recommendations:  - DSA, EBV, IgG, and donor risk labs ordered   - Wean supplemental oxygen as able to maintain SpO2 >92%, continue BiPAP overnight/with naps  - VBG ordered for AM  - NPO   - Following pleural cultures, right chest tube to remain given output (followed by CVTS)  - CXR daily as below, transitions to 2 vw on   - Follow cultures on left apical pleural effusion given exudative appearance  - Tacrolimus level subtherapeutic and increased on , repeat level ordered   - Reducing MMF to 500 mg bid in setting of leukopenia, may need to hold if WBC continues to downtrend   - Prednisone taper ordered    - Defer increasing dapsone to daily pending hgb stability   - CMV () not detected  - Transition to DOAC after G/J placement  - Continue with NG to LIS (except for when taking PO medications), IR to place GJ tube on   - PO vancomycin per primary  team     S/p bilateral sequential lung transplant (BSLT) for end stage COPD:  Persistent hypercapneic respiratory failure:   Persistent hypotension, Resolved:   Bilateral hydroPTX:  Right hemidiaphragm palsy: Explant pathology with severe emphysema with subpleural bullae formation, changes of chronic bronchitis, subpleural scars and patchy pulmonary edema; benign hilar lymph nodes.  No evidence of PGD post-op.  Extubated 6/30.  Persistent hypercapnia without dyspnea, appears to be a respiratory drive problem.  Some improvement with BiPAP, limited tolerance in part due to anxiety.  Persistent low dose pressors weaned off 7/10, on scheduled midodrine (also s/p hydrocortisone 7/6-7/9 and fludrocortisone 7/6-7/11).  SNIFF (7/14) notable for right hemidiaphragm palsy.  Chest CT (7/18) with bilateral biapical effusions R>L and improved LLL atelectasis, also with LLL hydroPTX and bilateral PTX; bilateral chest tubes not communicating well with loculated effusion areas.  Bronch (7/19) with slight graying of mucosa noted at right anastomosis and scant secretions (slightly increased in RLL).  Left chest tube removed 7/20.  Chest CT (7/23) with increased loculated fluid within the left hemithorax with specks of air density in the loculated fluid, similar appearing loculated right hydroPTX with minimal decrease in fluid component (right chest tube appears to be outside the loculated hydroPTX), increased size of pleural based lesion in MARLON, and mild subcutaneous emphysema along right chest tube with minimal along tract of removed left chest tube.Left apical pigtail placed on 7/25, exudative.  On 0.5 to 1L oxymask while awake, BiPAP overnight (ongoing hypercapnia, increased 7/25 and improved post chest tube placement).    - DSA one month post-transplant (7/28, ordered)  - Ammonia monitoring twice weekly (screening for hyperammonemia post-lung transplant)  - Nebs: levalbuterol and Mucomyst QID  - Pulmonary toilet with chest  physiotherapy QID  - Aerobika and incentive spirometry hourly while awake  - Supplemental oxygen as needed to maintain SpO2 >92% (wean as able), BiPAP overnight/with naps given persistent hypercapnia  - Right chest tube managed by surgical team, remains with moderate output  - Left apical pigtail placed 7/25, exudative, non chylous  - CXR daily, today with improvement in left apical effusion with likely left loculated maybe intrafissural effusion which is persistent.   - Volume management per primary team  - Repeat inspection bronch in one month (~8/19), sooner if indicated  - Encourage activity including up to the chair and PT/OT     Immunosuppression:  Induction therapy with basiliximab (and high dose IV steroid) given intraoperatively, repeating basiliximab dose on POD#4.  - Tacrolimus 5 mg BID (via NJ tube, peak level appropriate 7/11).  Goal level 8-12.   Level 7/25 subtherapeutic at 6.9 at 13h, dose increased to 5.5 mg BID, repeat level 7/28 (ordered).  - Myfortic 720 mg BID (7/23, given GI symptoms) --> adjust back to suspension MMF at reduced dose of 750 mg BID, decreased again on 7/26 to 500 mg bid due to leukopenia, will transition back to PO myfortic once GJ is placed and tolerating PO intake consistently   - Prednisone 15 mg qAM / 12.5 mg qPM, next taper due 7/28 (ordered)  Date AM dose (mg) PM dose (mg)   7/21/22 15 12.5   7/28/22 12.5 12.5   8/4/22 12.5 10   8/18/22 10 10   9/15/22 10 7.5   10/13/22 7.5 7.5   11/10/22 7.5 5   12/8/22 5 5   1/5/23 5 2.5      Prophylaxis:   - Dapsone for PJP ppx (7/18), defer increasing to daily pending hgb stability  - VGCV for CMV ppx  - Nystatin for oral candidiasis ppx, 6 month course  - See below for serologies and viral ppx:    Donor Recipient Intervention   CMV status Positive Positive Valganciclovir POD #8-90, CMV (7/25) pending   EBV status Positive Positive EBV check monthly (7/28, ordered)   HSV status N/A Positive Not indicated      ID:  H/o M. peregrinum  colonization: Donor bronch cultures (OSH) with Strep beta hemolytic (not group A).  Recipient cultures as below.  Bronch cultures (7/12) NGTD.  - IgG at one month (7/28, ordered)  - AFB bronch culture (6/28, 6/29, 7/12) NGTD, AFB to be sent on all future bronchs  - Right pleural cultures (7/20) NGTD, follow  - Left pleural cultures (7/25) NGTD, follow     Streptococcus pneumoniae:  Stenotrophomonas maltophilia: Noted in recipient cultures at time of transplant.  S/p ceftazidime 6/28-7/10, vancomycin 7/7-7/8 and 6/28-6/30, and levofloxacin 7/10-7/12 for total 2 week ABX course.     H/o hepatitis C: Diagnosed in 1980s, 2 mos of treatment, quant negative since 10/2017, last positive 2/20/17 ([phone]).  Glenis positive on 6/2021 with negative HCV PCR.  H/o remote EtOH abuse.  MR elastography (4/27/21) with hepatology review and consult without any concerns post transplant.  Hepatitis C RNA negative and hepatitis C antibody positive (old) on 6/28.     PHS risk criteria donor:  Additional labs required post-transplant (between 4-8 weeks post-op): Hepatitis B, Hepatitis C, and HIV by SHERI ([id number], ordered 7/28).     Other relevant problems managed by primary team:      SVT:   Aflutter with RVR: SVT first noted on 7/14, prior to HD line placement.  Continues intermittently.  Aflutter with RVR to 200 7/17 triggered by activity.  EP consulted 7/17 given persistent tachycardia/dysrhythmia.  Midodrine held 7/19.  - Metoprolol per primary team (started 7/15)  - Heparin gtt (7/17) and then transition to DOAC after GJ placement     Left hand ischemia: Radial artery thrombosis identified on duplex doppler.  S/p thrombectomy on 7/2.  Completed high intensity heparin course.  Continue daily aspirin.      BACILIO:   Hyperkalemia: Likely multifactorial including medications (Bactrim, tacrolimus) and hypotension.  Fludrocortisone 7/6-7/11.  Potassium now stable.  S/p non-tunneled HD line 7/14.  Initial iHD run 7/14 limited by afib with RVR vs  SVT.  Last iHD run 7/16, line removed 7/22.  - Tacrolimus monitoring as above  - Management per nephrology and primary team     Abdominal pain: Noted 7/15, cramping pain.  ACR with large stool burden in colon, no obstruction or distention.  Some nausea but no emesis.  CT abdomen (7/15) with moderate to large gastric distention, otherwise without obstruction.  NG placed for LIS with moderate to large output for several days.  Increased abdominal pain 7/17 after clamping for 4 hours, tolerated clamping today without issue.  Gastric emptying study (7/20) with severe gastric emptying delay (95% retention at 4 hours).   - Simethicone prn  - Continue with TF via NJ and NG to LIS (except for when taking PO medications), IR to place G/J tube on 7/27 (delayed by supply chain issue)  - Additional management per primary     C diff infection: Abdominal pain with diarrhea noted 7/8, AXR without dilated bowel, moderate colonic stool burden.  C diff positive 7/11.  Loose stools (on tube feeds) stable.  - PO vancomycin (7/11) per primary team     Hypomagnesemia: Suppressed absorption d/t CNI.   - Continue daily magnesium with replacement protocol prn, schedule oral magnesium supplementation if needed pending improvement in stools    We appreciate the excellent care provided by the [place] [id number] team.  Recommendations communicated via in person rounding and this note.  Will continue to follow along closely, please do not hesitate to call with any questions or concerns.    Claribel Franks MD  Pulmonary Transplant/CF Attending  Pager: [phone]    Subjective & Interval History:     Used bipap overnight from about 9pm to 5am, denies headaches, dizziness. She has significant pain with inspiration and any movement of left pectoral region. She does not notice a significant difference in her breathing, but PCO2 did improve significantly after left apical chest tube was placed with initial drainage of 400 ccs and then another 300ccs  "throughout the day. She was able to walk 200 ft yesterday down the reilly. Denies any cough, fevers, chills. Weaned to room air during visit, desats quickly but recovers quickly with deep breathing.     Review of Systems:     C: + intermittent low grade temps, no chills, + decreased weight  INTEGUMENTARY/SKIN: No rash or obvious new lesions  ENT/MOUTH: + sore throat, no sinus pain, no nasal congestion or drainage  RESP: See interval history  CV: No chest pain, no palpitations, + peripheral edema improving  GI: + intermittent nausea, no vomiting, no reflux symptoms  : No dysuria  MUSCULOSKELETAL: No myalgias, no arthralgias  ENDOCRINE: Blood sugars with adequate control  NEURO: No headache, no numbness or tingling  PSYCHIATRIC: Mood stable    Physical Exam:     All notes, images, and labs from past 24 hours (at minimum) were reviewed.    Vital signs:  Temp: 98.9  F (37.2  C) Temp src: Axillary BP: 117/65 Pulse: 101   Resp: 13 SpO2: 99 % O2 Device: BiPAP/CPAP Oxygen Delivery: 1 LPM Height: 157.5 cm (5' 2\") Weight: 74 kg (163 lb 2.3 oz)  I/O:       Intake/Output Summary (Last 24 hours) at 7/26/2022 1443  Last data filed at 7/26/2022 1300  Gross per 24 hour   Intake 1176 ml   Output 1720 ml   Net -544 ml         Constitutional: Sitting up in chair, in no apparent distress.   HEENT: Eyes with pink conjunctivae, anicteric.  Oral mucosa moist without lesions.  NJ and NG tubes.  PULM: Diminished air flow t/o left and to RLL.  Few scattered crackles.  No rhonchi, no wheezes.  Non-labored breathing on 1L oxymask. Left apical chest tube in place, tender to light palpation surrounding insertion but without erythema or swelling.  CV: Normal S1 and S2.  Tachycardic.  No murmur, gallop, or rub.  Trace BLE edema.   ABD: NABS, softly distended, nontender.  MSK: Moves all extremities.  + muscle wasting.   NEURO: Alert, conversant.   SKIN: Warm, dry.  No rash on limited exam.   PSYCH: Mood stable.     Lines, Drains, and " Devices:  Peripheral IV 07/20/22 Anterior;Right Lower forearm (Active)   Site Assessment WDL 07/25/22 0800   Line Status Infusing 07/25/22 0800   Dressing Intervention New dressing  07/20/22 1548   Phlebitis Scale 0-->no symptoms 07/25/22 0800   Infiltration Scale 0 07/25/22 0400   Number of days: 5     Data:     LABS    CMP:   Recent Labs   Lab 07/26/22  0728 07/26/22  0406 07/26/22  0001 07/25/22 2020 07/25/22  0751 07/25/22  0648 07/24/22  0817 07/24/22  0754 07/23/22  1242 07/23/22  1059 07/22/22  0757 07/22/22  0729 07/21/22  1211 07/21/22  0955 07/20/22  0752 07/20/22  0648   NA  --   --   --   --   --  143  --  142  --  140  --  138  --  139   < > 135*   POTASSIUM  --   --   --   --   --  4.6  --  4.3  --  3.7  --  3.6  --  3.7   < > 3.6   CHLORIDE  --   --   --   --   --  93*  --  93*  --  93*  --  93*  --  95*   < > 92*   CO2  --   --   --   --   --  47*  --  45*  --  40*  --  41*  --  32*   < > 31*   ANIONGAP  --   --   --   --   --  3*  --  4*  --  7  --  4*  --  12   < > 12   * 160* 138* 185*   < > 172*   < > 144*   < > 151*   < > 164*   < > 164*   < > 101*   BUN  --   --   --   --   --  91.1*  --  95.9*  --  88.6*  --  92.6*  --  89.3*   < > 87.0*   CR  --   --   --   --   --  1.20*  --  1.32*  --  1.24*  --  1.58*  --  1.46*   < > 1.80*   GFRESTIMATED  --   --   --   --   --  52*  --  46*  --  50*  --  37*  --  41*   < > 32*   ESTUARDO  --   --   --   --   --  9.5  --  9.7  --  9.3  --  9.0  --  8.6*   < > 8.5*   MAG  --   --   --   --   --  2.4*  --  2.5*  --  2.7*  --  2.8*  --  2.6*  --  2.9*   PHOS  --   --   --   --   --  4.0  --  4.1  --  4.3  --  5.4*  --  5.0*  --  7.2*   PROTTOTAL  --   --   --   --   --  4.7*  --  4.6*  --   --   --   --   --  4.9*  --  4.9*   ALBUMIN  --   --   --   --   --  2.6*  --   --   --   --   --   --   --  2.8*  --   --    BILITOTAL  --   --   --   --   --  0.2  --   --   --   --   --   --   --  0.2  --   --    ALKPHOS  --   --   --   --   --  61  --   --   --    --   --   --   --  74  --   --    AST  --   --   --   --   --  19  --   --   --   --   --   --   --  17  --   --    ALT  --   --   --   --   --  15  --   --   --   --   --   --   --  18  --   --     < > = values in this interval not displayed.     CBC:   Recent Labs   Lab 07/26/22  0735 07/25/22  0648 07/24/22  0754 07/23/22  1059   WBC 3.4* 4.0 5.8 7.8   RBC 2.25* 2.31* 2.35* 2.52*   HGB 7.1* 7.3* 7.3* 8.0*   HCT 23.8* 24.7* 24.8* 25.8*   * 107* 106* 102*   MCH 31.6 31.6 31.1 31.7   MCHC 29.8* 29.6* 29.4* 31.0*   RDW 17.0* 17.0* 17.0* 17.1*    267 256 256       INR: No lab results found in last 7 days.    Glucose:   Recent Labs   Lab 07/26/22  0728 07/26/22  0406 07/26/22  0001 07/25/22  2020 07/25/22  1600 07/25/22  1218   * 160* 138* 185* 140*  140* 103*       Blood Gas:   Recent Labs   Lab 07/26/22  0735 07/25/22  1610 07/25/22  0648   PHV 7.45* 7.43 7.37   PCO2V 76* 79* 91*   PO2V 50* 40 46   HCO3V 53* 52* 52*   LINDY 26.3* 25.1* 21.3*   O2PER 30 1 6       Culture Data No results for input(s): CULT in the last 168 hours.    Virology Data:   Lab Results   Component Value Date    FLUAH1 Not Detected 06/29/2022    FLUAH3 Not Detected 06/29/2022    TR9992 Not Detected 06/29/2022    IFLUB Not Detected 06/29/2022    RSVA Not Detected 06/29/2022    RSVB Not Detected 06/29/2022    PIV1 Not Detected 06/29/2022    PIV2 Not Detected 06/29/2022    PIV3 Not Detected 06/29/2022    HMPV Not Detected 06/29/2022       Historical CMV results (last 3 of prior testing):  Lab Results   Component Value Date    CMVQNT Not Detected 07/25/2022     No results found for: CMVLOG    Urine Studies    Recent Labs   Lab Test 07/08/22  0831 07/05/22  1004   URINEPH 5.5 5.5   NITRITE Negative Negative   LEUKEST Negative Negative   WBCU 1 5       Most Recent [facility] Pulmonary Function Testing (FVC/FEV1 only)  FVC-Pre   Date Value Ref Range Status   04/29/2022 1.82 L    11/11/2021 2.17 L    06/14/2021 2.00 L      FVC-%Pred-Pre    Date Value Ref Range Status   04/29/2022 58 %    11/11/2021 70 %    06/14/2021 64 %      FEV1-Pre   Date Value Ref Range Status   04/29/2022 0.51 L    11/11/2021 0.53 L    06/14/2021 0.54 L      FEV1-%Pred-Pre   Date Value Ref Range Status   04/29/2022 20 %    11/11/2021 21 %    06/14/2021 21 %        IMAGING    Recent Results (from the past 48 hour(s))   CT Chest w/o Contrast    Narrative    EXAMINATION: Chest CT  7/23/2022 5:07 PM    CLINICAL HISTORY: follow up since chest tube removed    COMPARISON: Chest x-ray same day, CT chest 7/18/2022    TECHNIQUE: CT imaging obtained through the chest without contrast.  Axial, coronal, and sagittal reconstructions and axial MIP reformatted  images are reviewed.     FINDINGS:    Devices: Nasogastric and feeding tube tips course into the stomach  then project beyond the field-of-view.    Lower neck and axillae: No enlarged supraclavicular nodes are present.  No actionable nodule is present in the imaged portion of the thyroid  lobes. No axillary lymphadenopathy.    Heart: The heart is enlarged. Unchanged small pericardial fluid.    Mediastinum and sonia: No mediastinal mass is present. Prominent  mediastinal lymph nodes are present, likely reactive.     Vessels: Normal caliber of the aorta and main pulmonary artery.  Scattered scattered atherosclerotic calcifications of the thoracic  aorta and coronary arteries.    Airways: The central tracheobronchial tree is clear.    Lungs: Stable postsurgical changes of bilateral lung transplant.  Intact clam shell sternotomy wires. Right basilar chest tube in place.  Similar-appearing loculated right-sided hydropneumothorax. No  significant change in the small loculated hydropneumothorax on the  right lung base, with air tracking along the fissure. Increased left  loculated hydropneumothorax in the apex and base with especially along  the mediastinum (series 5, image 27). No new focal consolidation.  Increased size of pleural-based  lesion in the left upper lobe,,  measuring about 3 cm compared to 2.2 cm (series 3, image 14); apparent  increase in size possibly secondary to adjacent  atelectasis/consolidation.     Upper abdomen: Limited evaluation but small volume of ascites remains  in the visualized upper abdomen.     Bones: No acute or aggressive osseous abnormality.    Soft tissues: Small amount of subcutaneous emphysema along the right  chest wall.      Impression    IMPRESSION:     1. Increased loculated fluid in the left hemithorax with specks of air  density in the loculated fluid, especially increased loculation along  the  mediastinum, compared to CT 7/18/2022.  2. Similar appearing loculated right hydropneumothorax compared to  prior CT 7/18/2022 with minimal decrease in fluid component.  Right-sided chest tube appears to be outside the loculated  hydropneumothorax  3. Apparent increased size of pleural-based lesion in the left upper  lobe, possibly secondary to associated consolidation/atelectasis.  Recommend attention on follow-up.  4. Partially visualized ascites.  5. Mild subcutaneous emphysema along the right chest tube with minimal  subcutaneous emphysema along the tract of removed left chest tube.  6. Few prominent mediastinal lymph nodes likely reactive. Consider  attention on follow-up.    I have personally reviewed the examination and initial interpretation  and I agree with the findings.    NIKOS JAVED MD         SYSTEM ID:  [id number]   XR Chest Port 1 View    Narrative    Exam:  Chest X-ray 7/24/2022 9:05 AM    History: Right basilar chest tube s/p lung transplant    Comparison: 7/23/2022    Findings:   Single portable AP view of the chest. Enteric tube side-port is near  the diaphragm. Feeding tube is subdiaphragmatic with its tip below the  field-of-view. Stable right basilar chest tube.  Unchanged bilateral pleural effusions with loculation in the left  lateral aspect. Stable perihilar mass. No pneumothorax.       Impression    Impression:   1. Enteric tube side port is near the diaphragm. Recommend advancing.  2. Stable bilateral pleural effusions and left hilar mass.    KANDACE ROJAS MD         SYSTEM ID:  [id number]   XR Chest Port 1 View    Narrative    EXAM: XR CHEST PORT 1 VIEW  7/25/2022 10:27 AM    HISTORY: 62 years Female Right basilar chest tube s/p lung transplant.      COMPARISON: Chest radiograph 7/24/2022, chest CT 7/23/2022.    TECHNIQUE: Portable AP view of the chest.     FINDINGS:   Postsurgical changes of bilateral lung transplant. Clamshell  sternotomy wires appear intact. Right basilar chest tube appears  stable. Two enteric tubes course below the level of the diaphragm and  below the field of view.    Midline trachea. Stable cardiomediastinal silhouette with silhouetting  of the left heart border. Similar appearance of right-sided pleural  effusion and moderate left sided pleural effusion with loculated left  lateral aspect. Increased left midlung linear opacity and  opacification of the left upper lung field. Similar appearance of  bilateral perihilar and left basilar hazy opacities.      Impression    IMPRESSION:   1.  Similar appearance of bilateral pleural effusions with left-sided  loculated lateral component.  2.  Increased left mid lung opacities with upper lung field  opacification, which may represent loculated pleural fluid,  atelectasis, or infection.  3.  Bilateral perihilar and left basilar hazy opacities, likely  representing edema versus atelectasis.  4.  Postsurgical changes of bilateral lung transplant    I have personally reviewed the examination and initial interpretation  and I agree with the findings.    ALVINA HARRY MD         SYSTEM ID:  [id number]

## 2022-07-26 NOTE — PLAN OF CARE
NURSING PROGRESS NOTE  Shift Summary      Date: July 26, 2022     Neuro/Musculoskeletal:  A&Ox4. Generalized weakness.   Cardiac:  SR- occassionally tachycardic.  VSS.  Afebrile.    Respiratory:  Sating above the 90s on 1L oxymask and Bipap overnight 15/5 at 30%. .  GI/:  Urine output mixed with loose Bms. Diarrhea continues had 2 bms on noc.  LBM: 7/26 Abdominal cramping intermittently- heating packs and pain meds.  Diet/Appetite:  Tolerating TF diet at goal rate.  Activity:  Assist of 2 to the commode due to multiple lines and drains and dyspnea with activity.   Pain:  Abdominal cramping. Left CT site causing pain and discomfort. Lidocaine patches applied and cold packs along with scheduled tylenol and prn oxycodone. Relief from interventions.   Skin:  No new deficits noted.   LDAs + Drips/IVF:  Right PIV running heparin gtt at therapeutic rate of 900 units/hr. New Left CT had 400 out after placement and slowed down overnight. Right CT with minimal output. NG to LIS. NJ with continuous TF.   Protocols/Labs:  Morning daily labs. CXR daily.     Pertinent Shift Updates:  Left CT pain and discomfort- Lidocaine patches,ice and pain meds for relief.       Plan:  Cont with plan of care.       Heath Min RN  .................................................... July 26, 2022   6:45 AM  [facility] ([facility]): [facility] ICU (Unit 6D)       Goal Outcome Evaluation:    Plan of Care Reviewed With: patient     Overall Patient Progress: no change    Outcome Evaluation: Left chest tube placed 7/25. Pt having pain at CT site and abdominal cramping continues. Taking tylenol and oxycodone. SOB with activity. Bipap continues overnight.      Problem: Bleeding (Lung Surgery)  Goal: Absence of Bleeding  Outcome: Ongoing, Progressing     Problem: Bowel Motility Impaired (Lung Surgery)  Goal: Effective Bowel Elimination  Outcome: Ongoing, Progressing     Problem: Hemodynamic Instability  (Lung Surgery)  Goal: Effective Tissue Perfusion  Outcome: Ongoing, Progressing     Problem: Infection (Lung Surgery)  Goal: Absence of Infection Signs and Symptoms  Outcome: Ongoing, Progressing  Intervention: Prevent or Manage Infection  Recent Flowsheet Documentation  Taken 7/26/2022 0400 by Heath Min RN  Infection Prevention:   environmental surveillance performed   hand hygiene promoted   equipment surfaces disinfected   personal protective equipment utilized  Taken 7/26/2022 0000 by Heath Min RN  Infection Prevention:   environmental surveillance performed   hand hygiene promoted   equipment surfaces disinfected   personal protective equipment utilized  Taken 7/25/2022 2030 by Heath Min RN  Infection Prevention:   environmental surveillance performed   hand hygiene promoted   equipment surfaces disinfected   personal protective equipment utilized     Problem: Pain (Lung Surgery)  Goal: Acceptable Pain Control  Outcome: Ongoing, Progressing  Intervention: Prevent or Manage Pain  Flowsheets  Taken 7/26/2022 0643  Pain Management Interventions:   medication (see MAR)   cold applied   repositioned   rest  Taken 7/26/2022 0410  Pain Management Interventions:   pillow support provided   rest   repositioned  Taken 7/26/2022 0150  Pain Management Interventions:   medication (see MAR)   cold applied   distraction   pillow support provided   repositioned  Taken 7/26/2022 0001  Pain Management Interventions:   medication (see MAR)   cold applied   distraction   pillow support provided   repositioned  Taken 7/25/2022 2151  Pain Management Interventions:   medication (see MAR)   rest   repositioned  Taken 7/25/2022 2028  Pain Management Interventions:   medication (see MAR)   rest   repositioned     Problem: Plan of Care - These are the overarching goals to be used throughout the patient stay.    Goal: Plan of Care Review/Shift Note  Description: The Plan of Care Review/Shift note should be completed every  shift.  The Outcome Evaluation is a brief statement about your assessment that the patient is improving, declining, or no change.  This information will be displayed automatically on your shift note.  Outcome: Ongoing, Progressing  Flowsheets (Taken 7/26/2022 0643)  Plan of Care Reviewed With: patient  Outcome Evaluation: Left chest tube placed 7/25. Pt having pain at CT site and abdominal cramping continues. Taking tylenol and oxycodone. SOB with activity. Bipap continues overnight.  Overall Patient Progress: no change

## 2022-07-27 ENCOUNTER — APPOINTMENT (OUTPATIENT)
Dept: INTERVENTIONAL RADIOLOGY/VASCULAR | Facility: CLINIC | Age: 62
DRG: 007 | End: 2022-07-27
Attending: PHYSICIAN ASSISTANT
Payer: MEDICARE

## 2022-07-27 ENCOUNTER — APPOINTMENT (OUTPATIENT)
Dept: GENERAL RADIOLOGY | Facility: CLINIC | Age: 62
DRG: 007 | End: 2022-07-27
Attending: PHYSICIAN ASSISTANT
Payer: MEDICARE

## 2022-07-27 DIAGNOSIS — I48.91 POSTOPERATIVE ATRIAL FIBRILLATION (H): Primary | ICD-10-CM

## 2022-07-27 DIAGNOSIS — I97.89 POSTOPERATIVE ATRIAL FIBRILLATION (H): Primary | ICD-10-CM

## 2022-07-27 LAB
ANION GAP SERPL CALCULATED.3IONS-SCNC: 8 MMOL/L (ref 7–15)
BACTERIA SPEC CULT: NO GROWTH
BASE EXCESS BLDV CALC-SCNC: 29.1 MMOL/L (ref -7.7–1.9)
BASOPHILS # BLD AUTO: 0 10E3/UL (ref 0–0.2)
BASOPHILS NFR BLD AUTO: 0 %
BUN SERPL-MCNC: 84.6 MG/DL (ref 8–23)
CALCIUM SERPL-MCNC: 9.7 MG/DL (ref 8.8–10.2)
CHLORIDE SERPL-SCNC: 90 MMOL/L (ref 98–107)
CREAT SERPL-MCNC: 1.35 MG/DL (ref 0.51–0.95)
DEPRECATED HCO3 PLAS-SCNC: 45 MMOL/L (ref 22–29)
EOSINOPHIL # BLD AUTO: 0.1 10E3/UL (ref 0–0.7)
EOSINOPHIL NFR BLD AUTO: 3 %
ERYTHROCYTE [DISTWIDTH] IN BLOOD BY AUTOMATED COUNT: 16.6 % (ref 10–15)
GFR SERPL CREATININE-BSD FRML MDRD: 45 ML/MIN/1.73M2
GLUCOSE BLDC GLUCOMTR-MCNC: 115 MG/DL (ref 70–99)
GLUCOSE BLDC GLUCOMTR-MCNC: 118 MG/DL (ref 70–99)
GLUCOSE BLDC GLUCOMTR-MCNC: 163 MG/DL (ref 70–99)
GLUCOSE BLDC GLUCOMTR-MCNC: 78 MG/DL (ref 70–99)
GLUCOSE BLDC GLUCOMTR-MCNC: 83 MG/DL (ref 70–99)
GLUCOSE SERPL-MCNC: 85 MG/DL (ref 70–99)
HCO3 BLDV-SCNC: 56 MMOL/L (ref 21–28)
HCT VFR BLD AUTO: 27.8 % (ref 35–47)
HGB BLD-MCNC: 8.3 G/DL (ref 11.7–15.7)
HOLD SPECIMEN: NORMAL
IMM GRANULOCYTES # BLD: 0.1 10E3/UL
IMM GRANULOCYTES NFR BLD: 2 %
INR PPP: 0.87 (ref 0.85–1.15)
LACTATE SERPL-SCNC: 0.4 MMOL/L (ref 0.7–2)
LYMPHOCYTES # BLD AUTO: 0.1 10E3/UL (ref 0.8–5.3)
LYMPHOCYTES NFR BLD AUTO: 4 %
MAGNESIUM SERPL-MCNC: 2.4 MG/DL (ref 1.7–2.3)
MCH RBC QN AUTO: 31.8 PG (ref 26.5–33)
MCHC RBC AUTO-ENTMCNC: 29.9 G/DL (ref 31.5–36.5)
MCV RBC AUTO: 107 FL (ref 78–100)
MONOCYTES # BLD AUTO: 0.3 10E3/UL (ref 0–1.3)
MONOCYTES NFR BLD AUTO: 8 %
NEUTROPHILS # BLD AUTO: 2.6 10E3/UL (ref 1.6–8.3)
NEUTROPHILS NFR BLD AUTO: 83 %
NRBC # BLD AUTO: 0 10E3/UL
NRBC BLD AUTO-RTO: 0 /100
O2/TOTAL GAS SETTING VFR VENT: 100 %
PATH REPORT.COMMENTS IMP SPEC: NO
PATH REPORT.COMMENTS IMP SPEC: NORMAL
PATH REPORT.COMMENTS IMP SPEC: NORMAL
PATH REPORT.FINAL DX SPEC: NORMAL
PATH REPORT.GROSS SPEC: NORMAL
PATH REPORT.MICROSCOPIC SPEC OTHER STN: NORMAL
PATH REPORT.RELEVANT HX SPEC: NORMAL
PCO2 BLDV: 72 MM HG (ref 40–50)
PH BLDV: 7.5 [PH] (ref 7.32–7.43)
PHOSPHATE SERPL-MCNC: 4 MG/DL (ref 2.5–4.5)
PLATELET # BLD AUTO: 235 10E3/UL (ref 150–450)
PO2 BLDV: 52 MM HG (ref 25–47)
POTASSIUM SERPL-SCNC: 4.9 MMOL/L (ref 3.4–5.3)
RBC # BLD AUTO: 2.61 10E6/UL (ref 3.8–5.2)
SODIUM SERPL-SCNC: 143 MMOL/L (ref 136–145)
UFH PPP CHRO-ACNC: 0.17 IU/ML
UFH PPP CHRO-ACNC: 0.25 IU/ML
WBC # BLD AUTO: 3.1 10E3/UL (ref 4–11)

## 2022-07-27 PROCEDURE — 94668 MNPJ CHEST WALL SBSQ: CPT

## 2022-07-27 PROCEDURE — 250N000013 HC RX MED GY IP 250 OP 250 PS 637: Performed by: SURGERY

## 2022-07-27 PROCEDURE — 250N000012 HC RX MED GY IP 250 OP 636 PS 637: Performed by: PHYSICIAN ASSISTANT

## 2022-07-27 PROCEDURE — 250N000013 HC RX MED GY IP 250 OP 250 PS 637: Performed by: STUDENT IN AN ORGANIZED HEALTH CARE EDUCATION/TRAINING PROGRAM

## 2022-07-27 PROCEDURE — 80048 BASIC METABOLIC PNL TOTAL CA: CPT | Performed by: STUDENT IN AN ORGANIZED HEALTH CARE EDUCATION/TRAINING PROGRAM

## 2022-07-27 PROCEDURE — 49446 CHANGE G-TUBE TO G-J PERC: CPT | Performed by: RADIOLOGY

## 2022-07-27 PROCEDURE — 250N000009 HC RX 250: Performed by: SURGERY

## 2022-07-27 PROCEDURE — 94660 CPAP INITIATION&MGMT: CPT

## 2022-07-27 PROCEDURE — 99233 SBSQ HOSP IP/OBS HIGH 50: CPT | Mod: 24 | Performed by: PHYSICIAN ASSISTANT

## 2022-07-27 PROCEDURE — 49440 PLACE GASTROSTOMY TUBE PERC: CPT | Performed by: RADIOLOGY

## 2022-07-27 PROCEDURE — 36415 COLL VENOUS BLD VENIPUNCTURE: CPT | Performed by: HOSPITALIST

## 2022-07-27 PROCEDURE — 99153 MOD SED SAME PHYS/QHP EA: CPT

## 2022-07-27 PROCEDURE — 250N000013 HC RX MED GY IP 250 OP 250 PS 637: Performed by: NURSE PRACTITIONER

## 2022-07-27 PROCEDURE — 49440 PLACE GASTROSTOMY TUBE PERC: CPT

## 2022-07-27 PROCEDURE — 88112 CYTOPATH CELL ENHANCE TECH: CPT | Mod: 26 | Performed by: PATHOLOGY

## 2022-07-27 PROCEDURE — 250N000013 HC RX MED GY IP 250 OP 250 PS 637: Performed by: HOSPITALIST

## 2022-07-27 PROCEDURE — 82803 BLOOD GASES ANY COMBINATION: CPT | Performed by: STUDENT IN AN ORGANIZED HEALTH CARE EDUCATION/TRAINING PROGRAM

## 2022-07-27 PROCEDURE — 250N000011 HC RX IP 250 OP 636: Performed by: PHYSICIAN ASSISTANT

## 2022-07-27 PROCEDURE — 88305 TISSUE EXAM BY PATHOLOGIST: CPT | Mod: 26 | Performed by: PATHOLOGY

## 2022-07-27 PROCEDURE — 0DHA3UZ INSERTION OF FEEDING DEVICE INTO JEJUNUM, PERCUTANEOUS APPROACH: ICD-10-PCS | Performed by: RADIOLOGY

## 2022-07-27 PROCEDURE — 250N000013 HC RX MED GY IP 250 OP 250 PS 637: Performed by: THORACIC SURGERY (CARDIOTHORACIC VASCULAR SURGERY)

## 2022-07-27 PROCEDURE — C1769 GUIDE WIRE: HCPCS

## 2022-07-27 PROCEDURE — 250N000009 HC RX 250: Performed by: RADIOLOGY

## 2022-07-27 PROCEDURE — 99233 SBSQ HOSP IP/OBS HIGH 50: CPT | Performed by: HOSPITALIST

## 2022-07-27 PROCEDURE — 71046 X-RAY EXAM CHEST 2 VIEWS: CPT | Mod: 26 | Performed by: RADIOLOGY

## 2022-07-27 PROCEDURE — 83735 ASSAY OF MAGNESIUM: CPT | Performed by: STUDENT IN AN ORGANIZED HEALTH CARE EDUCATION/TRAINING PROGRAM

## 2022-07-27 PROCEDURE — 255N000002 HC RX 255 OP 636: Performed by: RADIOLOGY

## 2022-07-27 PROCEDURE — 85610 PROTHROMBIN TIME: CPT | Performed by: PHYSICIAN ASSISTANT

## 2022-07-27 PROCEDURE — 85520 HEPARIN ASSAY: CPT | Performed by: HOSPITALIST

## 2022-07-27 PROCEDURE — 85025 COMPLETE CBC W/AUTO DIFF WBC: CPT | Performed by: STUDENT IN AN ORGANIZED HEALTH CARE EDUCATION/TRAINING PROGRAM

## 2022-07-27 PROCEDURE — 250N000011 HC RX IP 250 OP 636: Performed by: RADIOLOGY

## 2022-07-27 PROCEDURE — 99152 MOD SED SAME PHYS/QHP 5/>YRS: CPT

## 2022-07-27 PROCEDURE — 250N000012 HC RX MED GY IP 250 OP 636 PS 637: Performed by: INTERNAL MEDICINE

## 2022-07-27 PROCEDURE — 84100 ASSAY OF PHOSPHORUS: CPT | Performed by: STUDENT IN AN ORGANIZED HEALTH CARE EDUCATION/TRAINING PROGRAM

## 2022-07-27 PROCEDURE — 999N000157 HC STATISTIC RCP TIME EA 10 MIN

## 2022-07-27 PROCEDURE — 94640 AIRWAY INHALATION TREATMENT: CPT

## 2022-07-27 PROCEDURE — 36415 COLL VENOUS BLD VENIPUNCTURE: CPT | Performed by: STUDENT IN AN ORGANIZED HEALTH CARE EDUCATION/TRAINING PROGRAM

## 2022-07-27 PROCEDURE — 85520 HEPARIN ASSAY: CPT | Performed by: INTERNAL MEDICINE

## 2022-07-27 PROCEDURE — 94640 AIRWAY INHALATION TREATMENT: CPT | Mod: 76

## 2022-07-27 PROCEDURE — 250N000011 HC RX IP 250 OP 636: Performed by: STUDENT IN AN ORGANIZED HEALTH CARE EDUCATION/TRAINING PROGRAM

## 2022-07-27 PROCEDURE — 99152 MOD SED SAME PHYS/QHP 5/>YRS: CPT | Performed by: RADIOLOGY

## 2022-07-27 PROCEDURE — 71046 X-RAY EXAM CHEST 2 VIEWS: CPT

## 2022-07-27 PROCEDURE — 83605 ASSAY OF LACTIC ACID: CPT | Performed by: HOSPITALIST

## 2022-07-27 PROCEDURE — 120N000002 HC R&B MED SURG/OB UMMC

## 2022-07-27 RX ORDER — FENTANYL CITRATE 50 UG/ML
25-50 INJECTION, SOLUTION INTRAMUSCULAR; INTRAVENOUS EVERY 5 MIN PRN
Status: DISCONTINUED | OUTPATIENT
Start: 2022-07-27 | End: 2022-07-27 | Stop reason: CLARIF

## 2022-07-27 RX ORDER — NALOXONE HYDROCHLORIDE 0.4 MG/ML
0.4 INJECTION, SOLUTION INTRAMUSCULAR; INTRAVENOUS; SUBCUTANEOUS
Status: DISCONTINUED | OUTPATIENT
Start: 2022-07-27 | End: 2022-07-27 | Stop reason: CLARIF

## 2022-07-27 RX ORDER — FLUMAZENIL 0.1 MG/ML
0.2 INJECTION, SOLUTION INTRAVENOUS
Status: DISCONTINUED | OUTPATIENT
Start: 2022-07-27 | End: 2022-07-27 | Stop reason: CLARIF

## 2022-07-27 RX ORDER — NALOXONE HYDROCHLORIDE 0.4 MG/ML
0.2 INJECTION, SOLUTION INTRAMUSCULAR; INTRAVENOUS; SUBCUTANEOUS
Status: DISCONTINUED | OUTPATIENT
Start: 2022-07-27 | End: 2022-07-27 | Stop reason: CLARIF

## 2022-07-27 RX ORDER — CEFAZOLIN SODIUM 2 G/100ML
2 INJECTION, SOLUTION INTRAVENOUS
Status: COMPLETED | OUTPATIENT
Start: 2022-07-27 | End: 2022-07-27

## 2022-07-27 RX ORDER — IODIXANOL 320 MG/ML
150 INJECTION, SOLUTION INTRAVASCULAR ONCE
Status: COMPLETED | OUTPATIENT
Start: 2022-07-27 | End: 2022-07-27

## 2022-07-27 RX ADMIN — METOPROLOL TARTRATE 25 MG: 25 TABLET, FILM COATED ORAL at 09:14

## 2022-07-27 RX ADMIN — FENTANYL CITRATE 50 MCG: 50 INJECTION, SOLUTION INTRAMUSCULAR; INTRAVENOUS at 14:46

## 2022-07-27 RX ADMIN — LIDOCAINE HYDROCHLORIDE 15 ML: 10 INJECTION, SOLUTION EPIDURAL; INFILTRATION; INTRACAUDAL; PERINEURAL at 14:17

## 2022-07-27 RX ADMIN — ACETAMINOPHEN 975 MG: 325 TABLET, FILM COATED ORAL at 15:45

## 2022-07-27 RX ADMIN — IODIXANOL 25 ML: 320 INJECTION, SOLUTION INTRAVASCULAR at 15:04

## 2022-07-27 RX ADMIN — MYCOPHENOLATE MOFETIL 500 MG: 200 POWDER, FOR SUSPENSION ORAL at 09:15

## 2022-07-27 RX ADMIN — CALCIUM CARBONATE 600 MG (1,500 MG)-VITAMIN D3 400 UNIT TABLET 1 TABLET: at 09:14

## 2022-07-27 RX ADMIN — INSULIN GLARGINE 15 UNITS: 100 INJECTION, SOLUTION SUBCUTANEOUS at 09:18

## 2022-07-27 RX ADMIN — TACROLIMUS 5.5 MG: 5 CAPSULE ORAL at 09:15

## 2022-07-27 RX ADMIN — MIDAZOLAM 0.5 MG: 1 INJECTION INTRAMUSCULAR; INTRAVENOUS at 14:21

## 2022-07-27 RX ADMIN — LEVALBUTEROL HYDROCHLORIDE 1.25 MG: 1.25 SOLUTION RESPIRATORY (INHALATION) at 08:21

## 2022-07-27 RX ADMIN — CALCIUM CARBONATE 600 MG (1,500 MG)-VITAMIN D3 400 UNIT TABLET 1 TABLET: at 17:28

## 2022-07-27 RX ADMIN — MIDAZOLAM 1 MG: 1 INJECTION INTRAMUSCULAR; INTRAVENOUS at 14:07

## 2022-07-27 RX ADMIN — THERA TABS 1 TABLET: TAB at 15:45

## 2022-07-27 RX ADMIN — LIDOCAINE PATCH 4% 2 PATCH: 40 PATCH TOPICAL at 12:28

## 2022-07-27 RX ADMIN — FENTANYL CITRATE 50 MCG: 50 INJECTION, SOLUTION INTRAMUSCULAR; INTRAVENOUS at 13:55

## 2022-07-27 RX ADMIN — ACETYLCYSTEINE 2 ML: 200 SOLUTION ORAL; RESPIRATORY (INHALATION) at 08:21

## 2022-07-27 RX ADMIN — Medication 40 MG: at 20:36

## 2022-07-27 RX ADMIN — ACETAMINOPHEN 975 MG: 325 TABLET, FILM COATED ORAL at 20:36

## 2022-07-27 RX ADMIN — VANCOMYCIN HYDROCHLORIDE 125 MG: KIT at 20:37

## 2022-07-27 RX ADMIN — PREDNISONE 12.5 MG: 2.5 TABLET ORAL at 20:36

## 2022-07-27 RX ADMIN — ONDANSETRON 4 MG: 4 TABLET, ORALLY DISINTEGRATING ORAL at 09:14

## 2022-07-27 RX ADMIN — PREDNISONE 15 MG: 5 TABLET ORAL at 09:13

## 2022-07-27 RX ADMIN — NYSTATIN: 100000 CREAM TOPICAL at 09:17

## 2022-07-27 RX ADMIN — ACETYLCYSTEINE 2 ML: 200 SOLUTION ORAL; RESPIRATORY (INHALATION) at 11:43

## 2022-07-27 RX ADMIN — GLUCAGON HYDROCHLORIDE 0.5 MG: 1 INJECTION, POWDER, FOR SOLUTION INTRAMUSCULAR; INTRAVENOUS; SUBCUTANEOUS at 14:15

## 2022-07-27 RX ADMIN — NYSTATIN 1000000 UNITS: 100000 SUSPENSION ORAL at 15:47

## 2022-07-27 RX ADMIN — MIDAZOLAM 0.5 MG: 1 INJECTION INTRAMUSCULAR; INTRAVENOUS at 14:40

## 2022-07-27 RX ADMIN — ACETAMINOPHEN 975 MG: 325 TABLET, FILM COATED ORAL at 09:13

## 2022-07-27 RX ADMIN — CEFAZOLIN SODIUM 2 G: 2 INJECTION, SOLUTION INTRAVENOUS at 13:56

## 2022-07-27 RX ADMIN — FENTANYL CITRATE 25 MCG: 50 INJECTION, SOLUTION INTRAMUSCULAR; INTRAVENOUS at 14:21

## 2022-07-27 RX ADMIN — VANCOMYCIN HYDROCHLORIDE 125 MG: KIT at 15:45

## 2022-07-27 RX ADMIN — TACROLIMUS 5.5 MG: 5 CAPSULE ORAL at 17:29

## 2022-07-27 RX ADMIN — INSULIN ASPART 2 UNITS: 100 INJECTION, SOLUTION INTRAVENOUS; SUBCUTANEOUS at 20:38

## 2022-07-27 RX ADMIN — MYCOPHENOLATE MOFETIL 500 MG: 200 POWDER, FOR SUSPENSION ORAL at 20:37

## 2022-07-27 RX ADMIN — Medication 40 MG: at 09:14

## 2022-07-27 RX ADMIN — FENTANYL CITRATE 25 MCG: 50 INJECTION, SOLUTION INTRAMUSCULAR; INTRAVENOUS at 14:40

## 2022-07-27 RX ADMIN — VANCOMYCIN HYDROCHLORIDE 125 MG: KIT at 09:14

## 2022-07-27 RX ADMIN — Medication 1 PACKET: at 15:46

## 2022-07-27 RX ADMIN — LEVALBUTEROL HYDROCHLORIDE 1.25 MG: 1.25 SOLUTION RESPIRATORY (INHALATION) at 11:43

## 2022-07-27 RX ADMIN — THIAMINE HCL TAB 100 MG 100 MG: 100 TAB at 09:14

## 2022-07-27 RX ADMIN — LEVALBUTEROL HYDROCHLORIDE 1.25 MG: 1.25 SOLUTION RESPIRATORY (INHALATION) at 15:54

## 2022-07-27 RX ADMIN — METOPROLOL TARTRATE 25 MG: 25 TABLET, FILM COATED ORAL at 20:36

## 2022-07-27 RX ADMIN — NYSTATIN: 100000 CREAM TOPICAL at 20:37

## 2022-07-27 RX ADMIN — ACETYLCYSTEINE 2 ML: 200 SOLUTION ORAL; RESPIRATORY (INHALATION) at 15:54

## 2022-07-27 RX ADMIN — DAPSONE 50 MG: 25 TABLET ORAL at 09:30

## 2022-07-27 RX ADMIN — VALGANCICLOVIR HYDROCHLORIDE 450 MG: 50 POWDER, FOR SOLUTION ORAL at 09:16

## 2022-07-27 ASSESSMENT — ACTIVITIES OF DAILY LIVING (ADL)
ADLS_ACUITY_SCORE: 30

## 2022-07-27 NOTE — IR NOTE
Patient Name: Sofie Rodriguez  Medical Record Number: [id number]  Today's Date: 7/27/2022    Procedure: Gastrojejunostomy Tube Placement  Proceduralist: Gerber    Sedation start time: 1355  Sedation end time: 1450  Sedation medications administered: 2 mg versed, 150 mcg fentanyl  Total sedation time: 55 min    Procedure start time: 1407  Procedure end time: 1450    Report given to: Guerda RITTER 6D  : N/A    Other Notes: Pt arrived to IR room 5 from inpatient unit. Consent reviewed, pt confirmed. Pt denies any questions or concerns regarding procedure. Pt positioned supine and monitored per protocol. Site cleansed and dressed per protocol. Pt tolerated procedure without any noted complications. Pt transferred back to inpatient unit.

## 2022-07-27 NOTE — PROGRESS NOTES
Pulmonary Medicine  Cystic Fibrosis - Lung Transplant Team  Progress Note  2022       Patient: Sofie Rodriguez  MRN: [id number]  : 1960 (age 62 year old)  Transplant: 2022 (Lung), POD#29  Admission date: 2022    Assessment & Plan:     Sofie Rodriguez is a 62 year old female with a PMH significant for end-stage COPD, HTN, HFpEF, Mycobacterium peregrinum colonization, h/o hepatitis C, HECTOR s/p LEEP procedure, osteopenia, and former methamphetamine use.  Pt. is now s/p BSLT on 22, lungs slightly undersized.   Persistent low dose pressor needs post-op through 7/10.  Extubated POD #2 but with persistent hypercapnia, mostly compensated and only slightly improved with intermittent BiPAP.  Also with left radial artery thrombus (presumed secondary to arterial line) s/p thrombectomy 7/, BACILIO, and C diff.  Rehabilitation and airway clearance initially limited by dysrhythmia and gastric discomfort, now with gradual improvement.  Remains on 0.5-2L oxymask (SpO2 high 80s on RA) during the day and BiPAP overnight.      Today's recommendations:  - DSA, EBV, IgG, and donor risk labs ordered tomorrow  - Wean supplemental oxygen as able to maintain SpO2 >92%, continue BiPAP overnight/with naps, VBG ordered every morning  - Following pending pleural cultures, right chest tube to remain given output (followed by CVTS), left chest tube to remain given output (placed by IR)  - CXR daily as below  - Repeat chest CT without contrast  (not yet ordered) to evaluate pleural effusions  - Repeat inspection bronch ~, sooner if indicated  - Tacrolimus level ordered tomorrow  - Will consider holding MMF if WBC <3  - Prednisone taper ordered tomorrow  - Defer increasing dapsone to daily pending hgb stability (once consistently >8)  - Transition to DOAC after GJ tube placement  - IR to place GJ tube today then likely G to gravity and TF via J when okay to resume  - PO vancomycin ordered through tomorrow, will need  to revisit resuming if to begin on ABX     S/p bilateral sequential lung transplant (BSLT) for end stage COPD:  Persistent hypercapneic respiratory failure:   Persistent hypotension, Resolved:   Bilateral hydroPTX:  Right hemidiaphragm palsy: Explant pathology with severe emphysema with subpleural bullae formation, changes of chronic bronchitis, subpleural scars and patchy pulmonary edema; benign hilar lymph nodes.  No evidence of PGD post-op.  Extubated 6/30.  Persistent hypercapnia without dyspnea, appears to be a respiratory drive problem.  Persistent low dose pressors weaned off 7/10 and scheduled midodrine topped 7/19.  SNIFF (7/14) notable for right hemidiaphragm palsy.  Chest CT (7/18) with bilateral biapical effusions R>L and improved LLL atelectasis, also with LLL hydroPTX and bilateral PTX; bilateral chest tubes not communicating well with loculated effusion areas.  Bronch (7/19) with slight graying of mucosa noted at right anastomosis and scant secretions (slightly increased in RLL).  Left surgical chest tube removed 7/20.  Chest CT (7/23) with increased loculated fluid within the left hemithorax with specks of air density in the loculated fluid, similar appearing loculated right hydroPTX with minimal decrease in fluid component (right chest tube appears to be outside the loculated hydroPTX), increased size of pleural based lesion in MARLON, and mild subcutaneous emphysema along right chest tube with minimal along tract of removed left chest tube.  S/p left apical chest tube placed by IR 7/25, exudative.  On 0.5-2L oxymask while awake (SpO2 high 80s on RA), BiPAP overnight (ongoing hypercapnia).  - DSA one month post-transplant (7/28, ordered)  - Ammonia monitoring twice weekly (screening for hyperammonemia post-lung transplant)  - Nebs: levalbuterol and Mucomyst QID  - Pulmonary toilet with chest physiotherapy QID  - Aerobika and incentive spirometry hourly while awake  - Supplemental oxygen as needed to  maintain SpO2 >92% (wean as able), BiPAP overnight/with naps given persistent hypercapnia, VBG every morning (ordered)  - Right chest tube (managed by surgical team) and left apical chest tube (placed by IR), remain with moderate output  - CXR daily, today with continued left pleural effusion and associated atelectasis, continued prominent left perihilar opacity, and increased small right pleural effusion (personally reviewed)  - Repeat chest CT without contrast 7/29 (not yet ordered) to evaluate pleural effusions  - Volume management per primary team  - Repeat inspection bronch in one month (~8/19), sooner if indicated  - Encourage activity including up to the chair and PT/OT     Immunosuppression:  Induction therapy with basiliximab (and high dose IV steroid) given intraoperatively, repeating basiliximab dose on POD#4.  - Tacrolimus 5.5 mg BID (via NJ tube, peak level appropriate 7/11).  Goal level 8-12.  Repeat level 7/28 (ordered).  -  mg BID (decreased 7/27, GI symptoms/leukopenia), will consider holding if WBC <3 and will transition back to PO myfortic once GJ tube is placed and tolerating PO medications consistently   - Prednisone 15 mg qAM / 12.5 mg qPM, next taper due 7/28 (ordered)  Date AM dose (mg) PM dose (mg)   7/21/22 15 12.5   7/28/22 12.5 12.5   8/4/22 12.5 10   8/18/22 10 10   9/15/22 10 7.5   10/13/22 7.5 7.5   11/10/22 7.5 5   12/8/22 5 5   1/5/23 5 2.5      Prophylaxis:   - Dapsone for PJP ppx (7/18), defer increasing to daily pending hgb stability (once consistently >8)  - VGCV for CMV ppx, CMV negative 7/25  - Nystatin for oral candidiasis ppx, 6 month course  - See below for serologies and viral ppx:    Donor Recipient Intervention   CMV status Positive Positive Valganciclovir POD #8-90   EBV status Positive Positive EBV check monthly (7/28, ordered)   HSV status N/A Positive Not indicated      ID:  H/o M. peregrinum colonization: Donor bronch cultures (OSH) with Strep beta  hemolytic (not group A).  Recipient cultures as below.  Bronch cultures (7/12) NGTD.  - IgG at one month (7/28, ordered)  - AFB bronch culture (6/28, 6/29, 7/12) NGTD, AFB to be sent on all future bronchs  - Right pleural cultures (7/20) NGTD, follow  - Left pleural cultures (7/25) NGTD, follow     Streptococcus pneumoniae:  Stenotrophomonas maltophilia: Noted in recipient cultures at time of transplant.  S/p ceftazidime 6/28-7/10, vancomycin 7/7-7/8 and 6/28-6/30, and levofloxacin 7/10-7/12 for total 2 week ABX course.     H/o hepatitis C: Diagnosed in 1980s, 2 mos of treatment, quant negative since 10/2017, last positive 2/20/17 ([phone]).  Glenis positive on 6/2021 with negative HCV PCR.  H/o remote EtOH abuse.  MR elastography (4/27/21) with hepatology review and consult without any concerns post transplant.  Hepatitis C RNA negative and hepatitis C antibody positive (old) on 6/28.     PHS risk criteria donor:  Additional labs required post-transplant (between 4-8 weeks post-op): Hepatitis B, Hepatitis C, and HIV by SHERI ([id number], ordered 7/28).     Other relevant problems managed by primary team:      SVT:   Aflutter with RVR: SVT first noted on 7/14, prior to HD line placement.  Continues intermittently.  Aflutter with RVR to 200 7/17 triggered by activity.  EP consulted 7/17 given persistent tachycardia/dysrhythmia.  Midodrine held 7/19.  - Metoprolol per primary team (started 7/15)  - Heparin drip (7/17) and then transition to DOAC after GJ tube placement     Left hand ischemia: Radial artery thrombosis identified on duplex doppler.  S/p thrombectomy on 7/2.  Completed high intensity heparin course.  Continue daily aspirin.      BACILIO:   Hyperkalemia: Likely multifactorial including medications (Bactrim, tacrolimus) and hypotension.  Fludrocortisone 7/6-7/11.  Potassium now stable.  S/p non-tunneled HD line 7/14.  Initial iHD run 7/14 limited by afib with RVR vs SVT.  Last iHD run 7/16, line removed 7/22.  -  Tacrolimus monitoring as above  - Management per nephrology and primary team     Abdominal pain: Noted 7/15, cramping pain.  ACR with large stool burden in colon, no obstruction or distention.  Some nausea but no emesis.  CT abdomen (7/15) with moderate to large gastric distention, otherwise without obstruction.  NG placed for LIS with moderate to large output for several days.  Increased abdominal pain 7/17 after clamping for 4 hours, tolerated clamping today without issue.  Gastric emptying study (7/20) with severe gastric emptying delay (95% retention at 4 hours).   - Simethicone prn  - TF via NJ and NG to LIS (except for when taking PO medications), IR to place GJ tube 7/27 (delayed by supply chain issue) then likely G to gravity and TF via J when okay to resume  - Additional management per primary     C diff infection: Abdominal pain with diarrhea noted 7/8, AXR without dilated bowel, moderate colonic stool burden.  C diff positive 7/11.  Loose stools (on tube feeds) stable.  - PO vancomycin (7/11-7/28) per primary team, will need to revisit resuming if to begin on ABX     Hypomagnesemia: Suppressed absorption d/t CNI.   - Continue daily magnesium with replacement protocol prn, schedule oral magnesium supplementation if needed pending improvement in stools    We appreciate the excellent care provided by the [place] [id number] team.  Recommendations communicated via in person rounding and this note.  Will continue to follow along closely, please do not hesitate to call with any questions or concerns.    Patient discussed with Dr. Franks.    Yuliet Aviles PA-C  [facility]patient [facility]  Pulmonary CF/Transplant     Subjective & Interval History:     Remains on 0.5-2L oxymask during the day, SpO2 high 80s on RA.  Slept well last night, used BiPAP.  Less sleepy during the day.  Received chest physiotherapy BID yesterday, minimal cough/no sputum.  Left chest tube site pain persists although managed with prn oxycodone.  NG tube to  "LIS and TF via NJ tube on hold for procedure today.  Abdominal cramping improved, still having loose/watery brown stools.    Review of Systems:     C: No fever, no chills, + increased weight  INTEGUMENTARY/SKIN: No rash or obvious new lesions  ENT/MOUTH: + sore throat, no sinus pain, no nasal congestion or drainage  RESP: See interval history  CV: No cardiac chest pain, + improving peripheral edema  GI: + intermittent nausea, no vomiting, no reflux symptoms  : No dysuria  MUSCULOSKELETAL: See interval history  ENDOCRINE: Blood sugars with adequate control  NEURO: No headache, no numbness or tingling  PSYCHIATRIC: Mood stable    Physical Exam:     All notes, images, and labs from past 24 hours (at minimum) were reviewed.    Vital signs:  Temp: 98  F (36.7  C) Temp src: Oral BP: 116/62 Pulse: 102   Resp: 20 SpO2: 92 % O2 Device: Oxymask Oxygen Delivery: 2 LPM Height: 157.5 cm (5' 2\") Weight: 75.1 kg (165 lb 9.1 oz)  I/O:     Intake/Output Summary (Last 24 hours) at 7/27/2022 1447  Last data filed at 7/27/2022 1200  Gross per 24 hour   Intake 416 ml   Output 1105 ml   Net -689 ml     Constitutional: Sitting up in chair, in no apparent distress.   HEENT: Eyes with pink conjunctivae, anicteric.  Oral mucosa moist without lesions.  NJ and NG tubes.  PULM: Diminished air flow to bases bilaterally.  No crackles, no rhonchi, no wheezes.  Non-labored breathing on 2L.  Bilateral chest tubes without air leak.  CV: Normal S1 and S2.  Tachycardic.  No murmur, gallop, or rub.  Trace BLE edema.   ABD: NABS, softly distended, nontender.    MSK: Moves all extremities.  + muscle wasting.   NEURO: Alert, conversant.   SKIN: Warm, dry.  No rash on limited exam.  Visualized aspects of clamshell incision intact (center partially covered).   PSYCH: Mood stable.     Lines, Drains, and Devices:  Peripheral IV 07/20/22 Anterior;Right Lower forearm (Active)   Site Assessment WDL 07/27/22 1200   Line Status Infusing 07/27/22 1200   Dressing " Intervention New dressing  07/20/22 1548   Phlebitis Scale 0-->no symptoms 07/27/22 1200   Infiltration Scale 0 07/27/22 1200   Number of days: 7     Data:     LABS    CMP:   Recent Labs   Lab 07/27/22  1214 07/27/22  0801 07/27/22  0646 07/27/22  0444 07/26/22  1245 07/26/22  0735 07/25/22  0751 07/25/22  0648 07/24/22  0817 07/24/22  0754 07/21/22  1211 07/21/22  0955   NA  --   --  143  --   --  140  --  143  --  142   < > 139   POTASSIUM  --   --  4.9  --   --  4.3  --  4.6  --  4.3   < > 3.7   CHLORIDE  --   --  90*  --   --  90*  --  93*  --  93*   < > 95*   CO2  --   --  45*  --   --  47*  --  47*  --  45*   < > 32*   ANIONGAP  --   --  8  --   --  3*  --  3*  --  4*   < > 12   GLC 78 83 85 115*   < > 166*   < > 172*   < > 144*   < > 164*   BUN  --   --  84.6*  --   --  89.2*  --  91.1*  --  95.9*   < > 89.3*   CR  --   --  1.35*  --   --  1.25*  --  1.20*  --  1.32*   < > 1.46*   GFRESTIMATED  --   --  45*  --   --  49*  --  52*  --  46*   < > 41*   ESTUARDO  --   --  9.7  --   --  9.5  --  9.5  --  9.7   < > 8.6*   MAG  --   --  2.4*  --   --  2.3  --  2.4*  --  2.5*   < > 2.6*   PHOS  --   --  4.0  --   --  3.8  --  4.0  --  4.1   < > 5.0*   PROTTOTAL  --   --   --   --   --   --   --  4.7*  --  4.6*  --  4.9*   ALBUMIN  --   --   --   --   --   --   --  2.6*  --   --   --  2.8*   BILITOTAL  --   --   --   --   --   --   --  0.2  --   --   --  0.2   ALKPHOS  --   --   --   --   --   --   --  61  --   --   --  74   AST  --   --   --   --   --   --   --  19  --   --   --  17   ALT  --   --   --   --   --   --   --  15  --   --   --  18    < > = values in this interval not displayed.     CBC:   Recent Labs   Lab 07/27/22  0646 07/26/22  0735 07/25/22  0648 07/24/22  0754   WBC 3.1* 3.4* 4.0 5.8   RBC 2.61* 2.25* 2.31* 2.35*   HGB 8.3* 7.1* 7.3* 7.3*   HCT 27.8* 23.8* 24.7* 24.8*   * 106* 107* 106*   MCH 31.8 31.6 31.6 31.1   MCHC 29.9* 29.8* 29.6* 29.4*   RDW 16.6* 17.0* 17.0* 17.0*    409 901 970        INR:   Recent Labs   Lab 07/27/22  0646   INR 0.87       Glucose:   Recent Labs   Lab 07/27/22  1214 07/27/22  0801 07/27/22  0646 07/27/22  0444 07/26/22  2336 07/26/22  2003   GLC 78 83 85 115* 162* 196*       Blood Gas:   Recent Labs   Lab 07/27/22  0646 07/26/22  0735 07/25/22  1610   PHV 7.50* 7.45* 7.43   PCO2V 72* 76* 79*   PO2V 52* 50* 40   HCO3V 56* 53* 52*   LINDY 29.1* 26.3* 25.1*   O2PER 100 30 1       Culture Data No results for input(s): CULT in the last 168 hours.    Virology Data:   Lab Results   Component Value Date    FLUAH1 Not Detected 06/29/2022    FLUAH3 Not Detected 06/29/2022    KI3580 Not Detected 06/29/2022    IFLUB Not Detected 06/29/2022    RSVA Not Detected 06/29/2022    RSVB Not Detected 06/29/2022    PIV1 Not Detected 06/29/2022    PIV2 Not Detected 06/29/2022    PIV3 Not Detected 06/29/2022    HMPV Not Detected 06/29/2022       Historical CMV results (last 3 of prior testing):  Lab Results   Component Value Date    CMVQNT Not Detected 07/25/2022     No results found for: CMVLOG    Urine Studies    Recent Labs   Lab Test 07/08/22  0831 07/05/22  1004   URINEPH 5.5 5.5   NITRITE Negative Negative   LEUKEST Negative Negative   WBCU 1 5       Most Recent [facility] Pulmonary Function Testing (FVC/FEV1 only)  FVC-Pre   Date Value Ref Range Status   04/29/2022 1.82 L    11/11/2021 2.17 L    06/14/2021 2.00 L      FVC-%Pred-Pre   Date Value Ref Range Status   04/29/2022 58 %    11/11/2021 70 %    06/14/2021 64 %      FEV1-Pre   Date Value Ref Range Status   04/29/2022 0.51 L    11/11/2021 0.53 L    06/14/2021 0.54 L      FEV1-%Pred-Pre   Date Value Ref Range Status   04/29/2022 20 %    11/11/2021 21 %    06/14/2021 21 %        IMAGING    Recent Results (from the past 48 hour(s))   CT Chest Tube with Cath Placement    Narrative    PROCEDURES 7/25/2022 2:59 PM:  1. CT guided left anterior apical chest tube placement.    CLINICAL HISTORY: left apical effusion. Left chest tube  placement  requested.    COMPARISONS: CT 7/23/2022    ATTENDING RADIOLOGIST: NIYAH Bernard MD    I, KATHY BERNARD MD, attest that I was present in the procedure room for  the entire procedure.    MEDICATIONS: The patient was placed on continuous monitoring. Vital  signs were monitored by the Interventional Radiology nurse under the  Attending Physician's supervision. No intravenous sedation  administered. The patient remained stable throughout the procedure.    ATTENDING FACE-TO-FACE SEDATION TIME: No intravenous sedation  administered.    DOSE: 555 mGycm.    PROCEDURE: The patient understood the limitations, alternatives, and  risks of the procedure and requested the procedure be performed. Both  written and verbal consent were obtained.    The left anterior chest was prepped and draped in the usual sterile  fashion. 1% lidocaine without epinephrine was used for local  anesthesia.    Under CT guidance a 5 [place]ese SCONTO DIGITALE centesis catheter  needle was advanced into the left apical pleural effusion from an  anterior approach. CT image documenting catheter needle placement was  saved in the patient's record.    Needle removed. Catheter removed over guidewire. Track dilated over  guidewire to 12 T[place]olese size.    12 [place] [employer] Flexima APD non locking J-Tip catheter  advanced over guidewire and placed as a left anterior apical chest  tube.    Guidewire removed. CT image documenting placement and position of the  left chest tube was saved in the patient's record.    100 mL aspirated and submitted for the requested laboratory studies.    2-0 nylon catheter retaining suture and sterile dressing applied.  Catheter to water seal chest drain. No immediate complication.      Impression    IMPRESSION:  1. 12 [place] [employer] Flexima APD non locking J-Tip catheter  placed as a left anterior apical chest tube under CT guidance. 100 mL  aspirated and submitted for the requested laboratory studies.    2.  Catheter to water seal chest drainage. Catheter to 20 cm underwater  suction when the patient returns to her unit. Further chest tube  orders per patient's primary team or Pulmonology.    KATHY BERNARD MD         SYSTEM ID:  [id number]   XR Chest Port 1 View    Narrative    EXAM: XR CHEST PORT 1 VIEW  7/26/2022 7:51 AM    HISTORY: 62 years Female Right basilar chest tube s/p lung transplant  and now with left chest tube (7/25).     COMPARISON: Chest radiograph 7/25/22, chest CT 7/25/2022    TECHNIQUE: Portable AP view of the chest.     FINDINGS:   Postoperative changes of the lateral lung transplant. Clamshell  sternotomy wires appear intact. Two enteric tubes are seen coursing  below the level of the diaphragm and below the field of view. The  smaller diameter enteric tube side-port is near the level of the  diaphragm. Right basilar chest tube in place. Interval placement of a  left apical pigtail catheter.    Midline trachea. Stable cardiomediastinal silhouette. There is a  defined density overlying the left border of the cardiomediastinal  silhouette. Similar appearance of the right apical pleural fluid  collection. Significant decrease in the left apical pleural fluid  collection. Similar appearance of moderate left-sided basilar pleural  effusion with loculated left lateral aspect. No discernible  pneumothorax. Similar appearance of the bilateral perihilar and left  basilar linear opacities.      Impression    IMPRESSION:   1.  Ongoing left-sided pleural effusion with significantly decreased  apical component and interval placement of a left apical pigtail  catheter. There is a defined density overlying the left mediastinal  border that likely represents loculated fluid collection or fluid in  the pericardial space.  2.  Similar appearance of right pleural effusion.  3.  Bilateral perihilar and left basilar opacities, likely  representing edema versus atelectasis.  4.  Enteric tube side-port is near the level of  the diaphragm.  Recommend advancing..    I have personally reviewed the examination and initial interpretation  and I agree with the findings.    JOHANN MORAES MD         SYSTEM ID:  [id number]   XR Chest 2 Views    Narrative    Chest 2 views    INDICATION: Interval follow-up, lung transplant, bilateral chest tubes    COMPARISON: Yesterday    FINDINGS: Clamshell sternotomy from prior bilateral lung transplant  again noted. Chest tubes again noted. Heart size normal. Left  perihilar opacity unchanged. Thickening in the right minor fissure  increased. Feeding tube beyond the inferior margin of the image.  Calcification at the aortic knob. No new ectopic air collections.  Continued costophrenic angle blunting and haziness in the left lower  lung.      Impression    IMPRESSION: Continued left pleural effusion and associated  atelectasis. Continued prominent perihilar opacity on the left.  Increased small pleural effusion or atelectasis on the right. Prior  bilateral lung transplantation.    ALVINA HARRY MD         SYSTEM ID:  [id number]

## 2022-07-27 NOTE — PLAN OF CARE
Goal Outcome Evaluation:    Plan of Care Reviewed With: other (see comments)     Overall Patient Progress: no change    Outcome Evaluation: GJ tube placement today

## 2022-07-27 NOTE — PRE-PROCEDURE
GENERAL PRE-PROCEDURE:   Procedure:  GJ tube placement  Date/Time:  7/27/2022 1:45 PM    Risks and benefits: Risks, benefits and alternatives were discussed    Patient states understanding of procedure being performed: Yes    Patient's understanding of procedure matches consent: Yes    Procedure consent matches procedure scheduled: Yes    Appropriately NPO:  Yes  Mallampati  :  Grade 2- soft palate, base of uvula, tonsillar pillars, and portion of posterior pharyngeal wall visible  Lungs:  Lungs clear with good breath sounds bilaterally  Heart:  Normal heart sounds with tachycardia  History & Physical reviewed:  History and physical reviewed and no updates needed  Statement of review:  I have reviewed the lab findings, diagnostic data, medications, and the plan for sedation

## 2022-07-27 NOTE — PROCEDURES
[facility] Brief Procedure Note    Pre-operative diagnosis: Lung transplant, delayed gastric emptying   Post-operative diagnosis Same   Procedure: Percutaneous gastrojejunostomy tube placement   Surgeon: Jayne Mayes MD   Assistants(s): -   Estimated blood loss: Minimal        Findings: Uneventful placement of 18 Malagasy 45 cm long AMT gastrojejunostomy tube.  Tip in jejunum.   Complications: None.

## 2022-07-27 NOTE — PROGRESS NOTES
CLINICAL NUTRITION SERVICES - REASSESSMENT NOTE     Nutrition Prescription    RECOMMENDATIONS FOR MDs/PROVIDERS TO ORDER:  - Total daily fluids/adjustments per MD  - Minimize disruptions to EN regimen    Malnutrition Status:    Patient does not meet two of the established criteria necessary for diagnosing malnutrition but is at risk for malnutrition    Recommendations already ordered by Registered Dietitian (RD):  Continue current EN: Novasource Renal @ 35 ml/hr (840 ml) + 1 pkt Prosource    Future/Additional Recommendations:  Monitor tolerance of EN     EVALUATION OF THE PROGRESS TOWARD GOALS   Diet: NPO  Nutrition Support: Novasource Renal @ 35 ml/hr (840 ml) + 1 pkt Prosource daily provides 1720 kcal (30 kcal/kg), 87 g pro (1.5 g/kg), 154 g CHO, 602 ml free water, and 0 g fiber daily   Free Water: 30 mL q4 hours  Intake: 7 day average EN of 644 mL/day and ~0.6 pkts Prosource/day. Provides 1312 kcal (23 kcal/kg) and 65 g PRO (1.1 g/kg). Meets 94% estimated energy needs and 87% estimated protein needs.   Pt out of her room for procedure at this time.      NEW FINDINGS   Weight: 75.1 kg on 7/27, weight trending up over the past week.     Labs:   K+ 4.9 (WNL)  Phos 4 (WNL)    Meds:   Caltrate  Novolog- correction scale  Lantus Pen- 15 units BID; held   Thera-vit   Zofran  Thiamine    GI: GJ placement on 7/27    Skin: WOCN signed off on 7/18    Respiratory: Bipap overnight     MALNUTRITION  % Intake: No decreased intake noted- EN  % Weight Loss: None noted  Subcutaneous Fat Loss: None observed- per RD note from 7/20  Muscle Loss: None observed- per RD note from 7/20  Fluid Accumulation/Edema: Mild  Malnutrition Diagnosis: Patient does not meet two of the established criteria necessary for diagnosing malnutrition but is at risk for malnutrition    Previous Goals   Total avg nutritional intake to meet a minimum of 25 kcal/kg and 1.3 g PRO/kg daily (per dosing wt 56 kg).  Evaluation: Not met    Previous Nutrition  Diagnosis  Inadequate oral intake related to NPO status as evidenced by need for EN to meet 100% nutrition needs.    Evaluation: No change    CURRENT NUTRITION DIAGNOSIS  Inadequate oral intake related to NPO status as evidenced by need for EN to meet 100% nutrition needs.      INTERVENTIONS  Implementation  Enteral Nutrition - Continue     Goals  Total avg nutritional intake to meet a minimum of 25 kcal/kg and 1.3 g PRO/kg daily (per dosing wt 56 kg).    Monitoring/Evaluation  Progress toward goals will be monitored and evaluated per protocol.    Loida Norris, SARAH, LD  6D RD pager [phone]  Weekend/ED RD pager [phone]

## 2022-07-27 NOTE — PROGRESS NOTES
"  BRIEF CVTS PROGRESS NOTE    S:  Sofie Rodriguez is a 62 year old female with PMH significant for oxygen-dependent COPD, HF, HTN, HCV, osteopenia, and methamphetamine abuse in remission.  She is now s/p BSLT by Dr. Sunshine on 6/28/2022.  Her post-operative course has been complicated by LUE critical limb ischemia now s/p left radial thrombectomy on 7/1/2022, hypercapneic respiratory insufficiency, BACILIO, and dysphagia.    Using bipap at night, denies dyspnea, complains of being \"a little sore\" today - mainly at the left chest tube site.    O:  /75 (BP Location: Right arm, Cuff Size: Adult Regular)   Pulse 98   Temp 98.6  F (37  C) (Oral)   Resp 22   Ht 1.575 m (5' 2\")   Wt 75.1 kg (165 lb 9.1 oz)   SpO2 99%   BMI 30.28 kg/m       Gen:  Deconditioned but NAD  Neuro:  Alert and grossly oriented, speech clear  CV:  Warm and well-perfused, regular SR on monitor, 1+ pitting LE edema  Pulm:  Unlabored breathing on supplemental oxygen via oxiplus mask, right basilar and left apical chest tubes in place with serous output, no air leak  GI:  Nasal SBFT in place  MSK:  Generalized deconditioning, no gross deformities    A/P:  S/p BLST on 6/28/2022, post op course c/b LUE critical limb ischemia now s/p left radial thrombectomy on 7/1/2022, hypercapneic respiratory insufficiency, BACILIO, and dysphagia.     - Had 92 CC output overnight, continue surgical R basilar pleural chest tube to suction until output consistently <200ml per day; OK to water seal for ambulation or when out of room.    - IR-placed left apical pigtail chest tube on 7/25; management per Pulmonology   - Monitor loculated left lateral pleural effusion; defer IR/thoracentesis consultation at this time as volume likely too small to justify drainage   - Monitor for drainage from surgical chest tube sites - may be left open to air once no longer draining   - Per surgeon, lungs did not fill chest space   - Daily wound care per nursing:  Wound cleanser to " clamshell incision, pat dry, may be left open to air; keep incision C/D/I   - Consider diuresis as hemodynamics and renal function will allow in effort to dry up chest tube output   - Remainder of plan per Pulm Transplant/Medicine teams    CVTS will continue to follow for surgical chest tube and clamshell incision management.    Dr. Sunshine updated via written communication.    Azar Martell PA-C  Cardiothoracic Surgery  p: [phone]

## 2022-07-27 NOTE — CONSULTS
History of lung transplant with delayed gastric emptying needs percutaneous GJ tube placement. Was on hold due to supply chain issues, but will proceed with GJ tube placement Wednesday 7/27 as some have come in stock. Favorable window on CT abdomen earlier this month. Discussed with Dr. Kit Dewey PA-C  Interventional Radiology  [phone]

## 2022-07-27 NOTE — PLAN OF CARE
Neuro/Musculoskeletal:  A&Ox4. Generalized weakness.   Cardiac:  SR  VSS.  Afebrile.    Respiratory:  Sating above the 90s on 1L oxymask and Bipap overnight 15/5 at 30%. .  GI/:  Urine output mixed with loose Bms. Diarrhea continues had 2 bms on noc.    Diet/Appetite:  NPO  Activity:  Assist of 2 to the commode due to multiple lines and drains and dyspnea with activity.   Pain:  Denies  Skin:  No new deficits noted.   LDAs + Drips/IVF:  Right PIV running heparin gtt at therapeutic rate of 900 units/hr. Ctx2  NG to LIS. NJ clamped   Protocols/Labs:  Morning daily labs. CXR daily.     Plan:  Cont with plan of care.

## 2022-07-27 NOTE — PROGRESS NOTES
[facility]    Medicine Progress Note - Hospitalist Service, GOLD TEAM 10    Date of Admission:  6/28/2022    Assessment & Plan  Sofie Rodriguez is a 62 year old female admitted on 6/28/2022. She has PMH of end stage COPD s/p b/l lung transplant on 6/28/2022, HTN, HFpEF, h/o hepC, oteopenia, and former methamphetamine use, and she was transferred to [place] [id number] Medicine from MICU on 7/15/2022. She was admitted to the MICU on 7/13/20222 with prior hospital course complicated by left upper extremity acute limb ischemia s/p left radial thrombectomy on 7/1/2022. She was primarily treated for acute hypercapnic respiratory failure on intermittent BIPAP with worsening BACILIO while in the MICU. Breathing is improving, but patient has severely delayed gastric emptying found on NM study. PEGJ placement ordered, pending supply chain issue resolution.       Today's Plan:  - 130 pm PEGJ by IR on 7/27   : Post PEG G to Gravity and J for feeds when okay to resume  - Heparin gtt to resume post PEGJ, DOAC prior to discharge  - Continue to encourage OOB and up in chair  - BIPAP at night  - Follow-up pleural fluid studies for L Apical Fluid Collection, Pigtail by IR 7/25  - PO Vancomycin for CDiff -> to end 7/28.     End stage COPD s/p BOLT 6/28/2022  Acute hypercapnic hypoxic respiratory failure (improving), likely 2/2 decreased central respiratory drive vs respiratory muscle weakness and some pulmonary edema / fluid Transplanted 6/28. formal SNIFF test was performed on 7/14 showed R hemidiaphragm palsy. Of note transplanted lungs were also considered small for body size and could contribute to decreased respiratory compensation for hypercarbia. VBG relatively stable, most recent pCO2 74 (7/22).   - Continue BIPAP at night   - Simple mask w intermittent BIPAP for night and daytime naps; goal to keep O2sat above 92%  - f/u myasthenia gravis panel  (pending from 7/14)  - oxycodone 2.5-5mg q4h  PRN   - encourage activity and getting up in bed; PT/OT participation  - encourage chest physiotherapy QID    Immunosuppression:  - Prednisone 15mg AM, 12.5 PM  - Tacrolimus 5mg BID (trough goal 8-12)   -  BID  Prophylasxis:  - CMV - Valgancyclover  - Thrush - PO nysatin  - PJP - TMP-SMX (currently held given BACILIO); dapsone started 7/18 at 50mg qMWF (will try to increase to daily on 7/25 per pulm)     Pleural Effusion, Right and Left  - 1 R chest tube (basilar) in place currently (pericardial drain was removed 7/15, L basilar was removed 7/20). CT chest 7/14 showing unchanged bilateral effusions and unchanged biapical pneumothoraces with resolved left basilar pneumothorax; bibasilar chest tubes in place with pericardial drain (which was removed 7/15).   - daily CXR  - RIGHT Chest Tube - still with output  - LEFT Apical Fluid collection - IR targeting via CT guided pigtail placement     Hypotension, resolved  H/o HTN  H/o HFpEF  Likely vasoplegia post operatively. Last echo 7/7 normal EF with good LV function. S/p hydrocortisone 7/6-7/9 and fludrocortisone 7/6-7/11 without significant improvement.  - off midorine 7/19  - Holding PTA lasix 20mg daily     C.diff - vanco started 7/12, may need prolonged course given IS  Abdominal pain - due to volume / c diff / gastro paresis   Severe Gastroparesis - gastric emptying study 7/20  - PO vanc 125mg QID 7/12 to present  - gastric emptying study 7/20 showed delayed gastric emptying with retention of 95% of stomach contents after 4 hours; please keep patient NPO except tube feeds and meds  - Simethicone scheduled  - PEGJ planned for 7/27, heparin held     NJ tube  Feeding regimen:   - Adult Formula Drip Feeding: Continuous Novasource Renal; Nasojejunal; Goal Rate: 35; initiate at goal rate; mL/hr; Medication - Feeding Tube Flush Frequency: At least 15-30 mL water before and after medication administration and with tube  clogging     BACILIO  Hypermagnesemia  Hyperphosphatemia  Baseline renal function was normal prior to surgery. New onset renal failure after transplant, likely multifactorial due to pre-renal hypotension, less likely nephrotoxic agents. Overall kidney function improving. Today, Cr fluctuating but BUN increasing, with unclear urine output (7/22).   - HD cath removed 7/22, trend metabolites     Tachyarrthymia  Paroxysmal afib  Paroxysmal aflutter/AT  SVT vs afib.Had an occurrence during HD on 7/14. Had afib with RVR to 200s on 7/17. EP consult placed.asmyptomatic and stable during episodes. Aflutter episode (7/17) with transfer. Vagal maneuver responsive at time. No recent tachyarrhythmia episodes (7/22).   - Per EP: patient has had episodes of possible flutter (vs AT with 2:1 conduction) and afib with RVR  - heparin drip and transition to DOAC after PEGJ placement and chest tubes resolved (CHADS-VASc score of 2)  - On telemetry  - On metoprolol tartrate 25mg BID  - Discharge on ziopatch for 14 days with EP follow up in 1-2 months after     Stress-induced hyperglycemia  -200s over past day.   - on 15U glargine BID; continue today and re-evaluate as needed     Acute Blood Loss Anemia, stable  Initially from acute blood loss. Hb dropped to 6.8 on 7/14, requiring 1U pRBC. Post-transfusion Hb 9.4 > 8.3 > 8.6 > 8.7 (7/18).   - continue to monitor  - transfuse for hgb<7        Diet: Adult Formula Drip Feeding: Continuous Novasource Renal; Nasojejunal; Goal Rate: 35; initiate at 15 ml/hr and advance by 10 mL Q8H to goal; mL/hr; Medication - Feeding Tube Flush Frequency: At least 15-30 mL water before and after medication admin...  NPO per Anesthesia Guidelines for Procedure/Surgery Except for: Meds    DVT Prophylaxis: heparin  Dong Catheter: Not present  Central Lines: None  Cardiac Monitoring: None  Code Status: Full Code      Disposition Plan        The patient's care was discussed with the Patient and Patient's  Family.    Catalino Pantoja MD  Hospitalist Service, GOLD TEAM 10  [facility]  Securely message with the [employer] Web Console (learn more here)  Text page via [facility] Paging/Directory   Please see signed in provider for up to date coverage information      Clinically Significant Risk Factors Present on Admission                      ______________________________________________________________________    Interval History   Seen today for follow up: Post lung transplant, pleural effusion, chest tubes    No acute overnight events per patient or patient's family.    Pt reports improved L sided chest pain at the L chest tube site  Reports breathing is about the same as prior, not worse  Aware for PEGJ today      As of today/at time of my visit:  Patient denies any headache, sore throat  Patient denies any sleeping disturbance  Patient denies any nausea or vomiting  Patient reports no abdominal pain  Patient denies any chest pain  Patient reports no arm or leg edema      Data reviewed today: I reviewed all medications, new labs and imaging results over the last 24 hours. I personally reviewed no images or EKG's today.    Physical Exam   Vital Signs: Temp: 98  F (36.7  C) Temp src: Oral BP: 137/74 Pulse: 102   Resp: 17 SpO2: 100 % O2 Device: Oxymask Oxygen Delivery: 2 LPM  Weight: 165 lbs 9.05 oz  General: non-distressed adult  HEENT: Normocephalic, atraumatic, pupils equal round reactive, membranes moist. NGT, NJT  CV: normal capillary refill, heart regular rate and rhythm, tele  Respiratory: Non-labored breathing, bronchovesicular lung sounds, R Chest Tube noted, draining serosangiunous. L apical chest tube in place  Abdominal: soft, mildly tender in the epigastrium, no rebound, no guarding, no rigidity, +bowel sounds  Genitourinary: no suprapubic tenderness  Musculoskeletal: normal bulk and tone  Skin: no rash, normal turgor  Neurologic: no facial droop, moving upper and lower  extremities spontaneously, sensation in tact to light touch on extremities  Psychiatric: normal mood and affect    Data   Recent Labs   Lab 07/27/22  1214 07/27/22  0801 07/27/22  0646 07/26/22  1245 07/26/22  0735 07/25/22  0751 07/25/22  0648 07/24/22  0817 07/24/22  0754 07/21/22  1211 07/21/22  0955   WBC  --   --  3.1*  --  3.4*  --  4.0  --  5.8   < > 9.2   HGB  --   --  8.3*  --  7.1*  --  7.3*  --  7.3*   < > 8.4*   MCV  --   --  107*  --  106*  --  107*  --  106*   < > 102*   PLT  --   --  235  --  234  --  267  --  256   < > 305   INR  --   --  0.87  --   --   --   --   --   --   --   --    NA  --   --  143  --  140  --  143  --  142   < > 139   POTASSIUM  --   --  4.9  --  4.3  --  4.6  --  4.3   < > 3.7   CHLORIDE  --   --  90*  --  90*  --  93*  --  93*   < > 95*   CO2  --   --  45*  --  47*  --  47*  --  45*   < > 32*   BUN  --   --  84.6*  --  89.2*  --  91.1*  --  95.9*   < > 89.3*   CR  --   --  1.35*  --  1.25*  --  1.20*  --  1.32*   < > 1.46*   ANIONGAP  --   --  8  --  3*  --  3*  --  4*   < > 12   ESTUARDO  --   --  9.7  --  9.5  --  9.5  --  9.7   < > 8.6*   GLC 78 83 85   < > 166*   < > 172*   < > 144*   < > 164*   ALBUMIN  --   --   --   --   --   --  2.6*  --   --   --  2.8*   PROTTOTAL  --   --   --   --   --   --  4.7*  --  4.6*  --  4.9*   BILITOTAL  --   --   --   --   --   --  0.2  --   --   --  0.2   ALKPHOS  --   --   --   --   --   --  61  --   --   --  74   ALT  --   --   --   --   --   --  15  --   --   --  18   AST  --   --   --   --   --   --  19  --   --   --  17    < > = values in this interval not displayed.

## 2022-07-27 NOTE — PROGRESS NOTES
AOX4 on oxymask this am .5L. Patient went for Peg tube placement today. Ok to used J-tube by Tisha in IR. G-tube to gravity. NG & NJ removed by IR, MD notified. J-tube to Tube feeds restarted at 1800 at 35 mL/hr.  Heparin restarted at 1611, next Xa lab due at 2211. Patient triggered sepsis. Lactic 0.4.  Chest tubes to -20 suction and dressing changed. Now on 2L NC. VSS.Will continue to monitor.

## 2022-07-28 ENCOUNTER — APPOINTMENT (OUTPATIENT)
Dept: GENERAL RADIOLOGY | Facility: CLINIC | Age: 62
DRG: 007 | End: 2022-07-28
Attending: PHYSICIAN ASSISTANT
Payer: MEDICARE

## 2022-07-28 ENCOUNTER — APPOINTMENT (OUTPATIENT)
Dept: OCCUPATIONAL THERAPY | Facility: CLINIC | Age: 62
DRG: 007 | End: 2022-07-28
Attending: THORACIC SURGERY (CARDIOTHORACIC VASCULAR SURGERY)
Payer: MEDICARE

## 2022-07-28 ENCOUNTER — APPOINTMENT (OUTPATIENT)
Dept: CT IMAGING | Facility: CLINIC | Age: 62
DRG: 007 | End: 2022-07-28
Attending: HOSPITALIST
Payer: MEDICARE

## 2022-07-28 LAB
ALBUMIN SERPL BCG-MCNC: 3 G/DL (ref 3.5–5.2)
ALP SERPL-CCNC: 60 U/L (ref 35–104)
ALT SERPL W P-5'-P-CCNC: 12 U/L (ref 10–35)
AMMONIA PLAS-SCNC: 25 UMOL/L (ref 11–51)
ANION GAP SERPL CALCULATED.3IONS-SCNC: ABNORMAL MMOL/L
AST SERPL W P-5'-P-CCNC: 17 U/L (ref 10–35)
BASE EXCESS BLDV CALC-SCNC: 26.4 MMOL/L (ref -7.7–1.9)
BASOPHILS # BLD AUTO: 0 10E3/UL (ref 0–0.2)
BASOPHILS NFR BLD AUTO: 0 %
BILIRUB DIRECT SERPL-MCNC: <0.2 MG/DL (ref 0–0.3)
BILIRUB SERPL-MCNC: 0.2 MG/DL
BUN SERPL-MCNC: 88.1 MG/DL (ref 8–23)
CALCIUM SERPL-MCNC: 9.6 MG/DL (ref 8.8–10.2)
CHLORIDE SERPL-SCNC: 90 MMOL/L (ref 98–107)
CREAT SERPL-MCNC: 1.44 MG/DL (ref 0.51–0.95)
DEPRECATED HCO3 PLAS-SCNC: >50 MMOL/L (ref 22–29)
EOSINOPHIL # BLD AUTO: 0.2 10E3/UL (ref 0–0.7)
EOSINOPHIL NFR BLD AUTO: 4 %
ERYTHROCYTE [DISTWIDTH] IN BLOOD BY AUTOMATED COUNT: 16.9 % (ref 10–15)
GFR SERPL CREATININE-BSD FRML MDRD: 41 ML/MIN/1.73M2
GLUCOSE BLDC GLUCOMTR-MCNC: 115 MG/DL (ref 70–99)
GLUCOSE BLDC GLUCOMTR-MCNC: 133 MG/DL (ref 70–99)
GLUCOSE BLDC GLUCOMTR-MCNC: 157 MG/DL (ref 70–99)
GLUCOSE BLDC GLUCOMTR-MCNC: 174 MG/DL (ref 70–99)
GLUCOSE BLDC GLUCOMTR-MCNC: 192 MG/DL (ref 70–99)
GLUCOSE BLDC GLUCOMTR-MCNC: 210 MG/DL (ref 70–99)
GLUCOSE SERPL-MCNC: 120 MG/DL (ref 70–99)
HCO3 BLDV-SCNC: 56 MMOL/L (ref 21–28)
HCT VFR BLD AUTO: 29.6 % (ref 35–47)
HGB BLD-MCNC: 8.7 G/DL (ref 11.7–15.7)
IGG SERPL-MCNC: 336 MG/DL (ref 610–1616)
IMM GRANULOCYTES # BLD: 0.1 10E3/UL
IMM GRANULOCYTES NFR BLD: 3 %
LACTATE SERPL-SCNC: 0.3 MMOL/L (ref 0.7–2)
LYMPHOCYTES # BLD AUTO: 0.3 10E3/UL (ref 0.8–5.3)
LYMPHOCYTES NFR BLD AUTO: 8 %
MAGNESIUM SERPL-MCNC: 2.5 MG/DL (ref 1.7–2.3)
MCH RBC QN AUTO: 32.2 PG (ref 26.5–33)
MCHC RBC AUTO-ENTMCNC: 29.4 G/DL (ref 31.5–36.5)
MCV RBC AUTO: 110 FL (ref 78–100)
MONOCYTES # BLD AUTO: 0.3 10E3/UL (ref 0–1.3)
MONOCYTES NFR BLD AUTO: 9 %
NEUTROPHILS # BLD AUTO: 3 10E3/UL (ref 1.6–8.3)
NEUTROPHILS NFR BLD AUTO: 76 %
NRBC # BLD AUTO: 0 10E3/UL
NRBC BLD AUTO-RTO: 0 /100
O2/TOTAL GAS SETTING VFR VENT: 23 %
PCO2 BLDV: 88 MM HG (ref 40–50)
PH BLDV: 7.41 [PH] (ref 7.32–7.43)
PHOSPHATE SERPL-MCNC: 3.8 MG/DL (ref 2.5–4.5)
PLATELET # BLD AUTO: 240 10E3/UL (ref 150–450)
PO2 BLDV: 45 MM HG (ref 25–47)
POTASSIUM SERPL-SCNC: 4.1 MMOL/L (ref 3.4–5.3)
PROT SERPL-MCNC: 5.2 G/DL (ref 6.4–8.3)
RBC # BLD AUTO: 2.7 10E6/UL (ref 3.8–5.2)
SODIUM SERPL-SCNC: 144 MMOL/L (ref 136–145)
TACROLIMUS BLD-MCNC: 5.8 UG/L (ref 5–15)
TME LAST DOSE: NORMAL H
TME LAST DOSE: NORMAL H
UFH PPP CHRO-ACNC: 0.36 IU/ML
UFH PPP CHRO-ACNC: 0.48 IU/ML
WBC # BLD AUTO: 3.9 10E3/UL (ref 4–11)

## 2022-07-28 PROCEDURE — 83735 ASSAY OF MAGNESIUM: CPT | Performed by: STUDENT IN AN ORGANIZED HEALTH CARE EDUCATION/TRAINING PROGRAM

## 2022-07-28 PROCEDURE — 71046 X-RAY EXAM CHEST 2 VIEWS: CPT | Mod: 26 | Performed by: RADIOLOGY

## 2022-07-28 PROCEDURE — 71046 X-RAY EXAM CHEST 2 VIEWS: CPT

## 2022-07-28 PROCEDURE — 86833 HLA CLASS II HIGH DEFIN QUAL: CPT | Performed by: STUDENT IN AN ORGANIZED HEALTH CARE EDUCATION/TRAINING PROGRAM

## 2022-07-28 PROCEDURE — 36415 COLL VENOUS BLD VENIPUNCTURE: CPT | Performed by: STUDENT IN AN ORGANIZED HEALTH CARE EDUCATION/TRAINING PROGRAM

## 2022-07-28 PROCEDURE — 94640 AIRWAY INHALATION TREATMENT: CPT

## 2022-07-28 PROCEDURE — 97535 SELF CARE MNGMENT TRAINING: CPT | Mod: GO | Performed by: OCCUPATIONAL THERAPIST

## 2022-07-28 PROCEDURE — 250N000013 HC RX MED GY IP 250 OP 250 PS 637: Performed by: SURGERY

## 2022-07-28 PROCEDURE — 250N000009 HC RX 250: Performed by: PHYSICIAN ASSISTANT

## 2022-07-28 PROCEDURE — 36569 INSJ PICC 5 YR+ W/O IMAGING: CPT

## 2022-07-28 PROCEDURE — 71250 CT THORAX DX C-: CPT | Mod: 26 | Performed by: RADIOLOGY

## 2022-07-28 PROCEDURE — 250N000011 HC RX IP 250 OP 636: Performed by: STUDENT IN AN ORGANIZED HEALTH CARE EDUCATION/TRAINING PROGRAM

## 2022-07-28 PROCEDURE — 82803 BLOOD GASES ANY COMBINATION: CPT | Performed by: PHYSICIAN ASSISTANT

## 2022-07-28 PROCEDURE — 82374 ASSAY BLOOD CARBON DIOXIDE: CPT | Performed by: STUDENT IN AN ORGANIZED HEALTH CARE EDUCATION/TRAINING PROGRAM

## 2022-07-28 PROCEDURE — 87799 DETECT AGENT NOS DNA QUANT: CPT | Performed by: STUDENT IN AN ORGANIZED HEALTH CARE EDUCATION/TRAINING PROGRAM

## 2022-07-28 PROCEDURE — 250N000012 HC RX MED GY IP 250 OP 636 PS 637: Performed by: INTERNAL MEDICINE

## 2022-07-28 PROCEDURE — 85025 COMPLETE CBC W/AUTO DIFF WBC: CPT | Performed by: STUDENT IN AN ORGANIZED HEALTH CARE EDUCATION/TRAINING PROGRAM

## 2022-07-28 PROCEDURE — 82140 ASSAY OF AMMONIA: CPT | Performed by: NURSE PRACTITIONER

## 2022-07-28 PROCEDURE — 250N000012 HC RX MED GY IP 250 OP 636 PS 637: Performed by: HOSPITALIST

## 2022-07-28 PROCEDURE — 84100 ASSAY OF PHOSPHORUS: CPT | Performed by: STUDENT IN AN ORGANIZED HEALTH CARE EDUCATION/TRAINING PROGRAM

## 2022-07-28 PROCEDURE — G1010 CDSM STANSON: HCPCS

## 2022-07-28 PROCEDURE — 250N000013 HC RX MED GY IP 250 OP 250 PS 637: Performed by: STUDENT IN AN ORGANIZED HEALTH CARE EDUCATION/TRAINING PROGRAM

## 2022-07-28 PROCEDURE — 87521 HEPATITIS C PROBE&RVRS TRNSC: CPT | Performed by: NURSE PRACTITIONER

## 2022-07-28 PROCEDURE — 85520 HEPARIN ASSAY: CPT | Performed by: HOSPITALIST

## 2022-07-28 PROCEDURE — 99233 SBSQ HOSP IP/OBS HIGH 50: CPT | Mod: 24 | Performed by: PHYSICIAN ASSISTANT

## 2022-07-28 PROCEDURE — 99207 PR NO BILLABLE SERVICE THIS VISIT: CPT | Performed by: PHYSICIAN ASSISTANT

## 2022-07-28 PROCEDURE — 86832 HLA CLASS I HIGH DEFIN QUAL: CPT | Performed by: STUDENT IN AN ORGANIZED HEALTH CARE EDUCATION/TRAINING PROGRAM

## 2022-07-28 PROCEDURE — 250N000009 HC RX 250: Performed by: SURGERY

## 2022-07-28 PROCEDURE — 120N000002 HC R&B MED SURG/OB UMMC

## 2022-07-28 PROCEDURE — 83605 ASSAY OF LACTIC ACID: CPT | Performed by: HOSPITALIST

## 2022-07-28 PROCEDURE — 272N000019 HC KIT OPEN ENDED DOUBLE LUMEN

## 2022-07-28 PROCEDURE — 250N000013 HC RX MED GY IP 250 OP 250 PS 637: Performed by: NURSE PRACTITIONER

## 2022-07-28 PROCEDURE — 82784 ASSAY IGA/IGD/IGG/IGM EACH: CPT | Performed by: STUDENT IN AN ORGANIZED HEALTH CARE EDUCATION/TRAINING PROGRAM

## 2022-07-28 PROCEDURE — 82248 BILIRUBIN DIRECT: CPT | Performed by: NURSE PRACTITIONER

## 2022-07-28 PROCEDURE — 94640 AIRWAY INHALATION TREATMENT: CPT | Mod: 76

## 2022-07-28 PROCEDURE — 94660 CPAP INITIATION&MGMT: CPT

## 2022-07-28 PROCEDURE — 99233 SBSQ HOSP IP/OBS HIGH 50: CPT | Performed by: HOSPITALIST

## 2022-07-28 PROCEDURE — 250N000013 HC RX MED GY IP 250 OP 250 PS 637: Performed by: HOSPITALIST

## 2022-07-28 PROCEDURE — 250N000009 HC RX 250: Performed by: HOSPITALIST

## 2022-07-28 PROCEDURE — 250N000011 HC RX IP 250 OP 636

## 2022-07-28 PROCEDURE — 272N000201 ZZ HC ADHESIVE SKIN CLOSURE, DERMABOND

## 2022-07-28 PROCEDURE — 250N000013 HC RX MED GY IP 250 OP 250 PS 637: Performed by: THORACIC SURGERY (CARDIOTHORACIC VASCULAR SURGERY)

## 2022-07-28 PROCEDURE — 250N000012 HC RX MED GY IP 250 OP 636 PS 637: Performed by: PHYSICIAN ASSISTANT

## 2022-07-28 PROCEDURE — 80197 ASSAY OF TACROLIMUS: CPT | Performed by: PHYSICIAN ASSISTANT

## 2022-07-28 PROCEDURE — 250N000011 HC RX IP 250 OP 636: Performed by: HOSPITALIST

## 2022-07-28 PROCEDURE — 250N000013 HC RX MED GY IP 250 OP 250 PS 637

## 2022-07-28 RX ORDER — ACETAZOLAMIDE 500 MG/5ML
500 INJECTION, POWDER, LYOPHILIZED, FOR SOLUTION INTRAVENOUS ONCE
Status: COMPLETED | OUTPATIENT
Start: 2022-07-28 | End: 2022-07-28

## 2022-07-28 RX ORDER — LIDOCAINE 40 MG/G
CREAM TOPICAL
Status: DISCONTINUED | OUTPATIENT
Start: 2022-07-28 | End: 2022-08-05

## 2022-07-28 RX ORDER — VALGANCICLOVIR HYDROCHLORIDE 50 MG/ML
450 POWDER, FOR SOLUTION ORAL DAILY
Status: DISCONTINUED | OUTPATIENT
Start: 2022-07-28 | End: 2022-07-28

## 2022-07-28 RX ORDER — SIMETHICONE 40MG/0.6ML
40 SUSPENSION, DROPS(FINAL DOSAGE FORM)(ML) ORAL EVERY 6 HOURS PRN
Status: DISCONTINUED | OUTPATIENT
Start: 2022-07-28 | End: 2022-08-17 | Stop reason: HOSPADM

## 2022-07-28 RX ORDER — LEVALBUTEROL INHALATION SOLUTION 1.25 MG/3ML
1.25 SOLUTION RESPIRATORY (INHALATION) 3 TIMES DAILY
Status: DISCONTINUED | OUTPATIENT
Start: 2022-07-28 | End: 2022-08-05

## 2022-07-28 RX ORDER — ACETAMINOPHEN 325 MG/1
650 TABLET ORAL EVERY 4 HOURS PRN
Status: DISCONTINUED | OUTPATIENT
Start: 2022-07-28 | End: 2022-08-15

## 2022-07-28 RX ORDER — VALGANCICLOVIR HYDROCHLORIDE 50 MG/ML
450 POWDER, FOR SOLUTION ORAL DAILY
Status: DISCONTINUED | OUTPATIENT
Start: 2022-07-29 | End: 2022-07-28

## 2022-07-28 RX ORDER — METOPROLOL TARTRATE 25 MG/1
25 TABLET, FILM COATED ORAL 2 TIMES DAILY
Status: DISCONTINUED | OUTPATIENT
Start: 2022-07-28 | End: 2022-08-07

## 2022-07-28 RX ORDER — CALCIUM CARBONATE 500 MG/1
500-1000 TABLET, CHEWABLE ORAL 3 TIMES DAILY PRN
Status: DISCONTINUED | OUTPATIENT
Start: 2022-07-28 | End: 2022-08-17 | Stop reason: HOSPADM

## 2022-07-28 RX ORDER — METOPROLOL TARTRATE 25 MG/1
25 TABLET, FILM COATED ORAL 2 TIMES DAILY
Status: DISCONTINUED | OUTPATIENT
Start: 2022-07-28 | End: 2022-07-28

## 2022-07-28 RX ORDER — VALGANCICLOVIR HYDROCHLORIDE 50 MG/ML
450 POWDER, FOR SOLUTION ORAL
Status: DISCONTINUED | OUTPATIENT
Start: 2022-07-29 | End: 2022-08-02

## 2022-07-28 RX ORDER — ACETYLCYSTEINE 200 MG/ML
2 SOLUTION ORAL; RESPIRATORY (INHALATION) 3 TIMES DAILY
Status: DISCONTINUED | OUTPATIENT
Start: 2022-07-28 | End: 2022-08-05

## 2022-07-28 RX ADMIN — VANCOMYCIN HYDROCHLORIDE 125 MG: KIT at 13:54

## 2022-07-28 RX ADMIN — METOPROLOL TARTRATE 25 MG: 25 TABLET, FILM COATED ORAL at 21:03

## 2022-07-28 RX ADMIN — THERA TABS 1 TABLET: TAB at 13:54

## 2022-07-28 RX ADMIN — LEVALBUTEROL HYDROCHLORIDE 1.25 MG: 1.25 SOLUTION RESPIRATORY (INHALATION) at 12:10

## 2022-07-28 RX ADMIN — INSULIN ASPART 2 UNITS: 100 INJECTION, SOLUTION INTRAVENOUS; SUBCUTANEOUS at 21:28

## 2022-07-28 RX ADMIN — Medication 1 LOZENGE: at 21:03

## 2022-07-28 RX ADMIN — Medication 40 MG: at 10:21

## 2022-07-28 RX ADMIN — NYSTATIN 1000000 UNITS: 100000 SUSPENSION ORAL at 10:20

## 2022-07-28 RX ADMIN — ACETYLCYSTEINE 2 ML: 200 INHALANT RESPIRATORY (INHALATION) at 21:24

## 2022-07-28 RX ADMIN — TACROLIMUS 5.5 MG: 5 CAPSULE ORAL at 17:33

## 2022-07-28 RX ADMIN — VANCOMYCIN HYDROCHLORIDE 125 MG: KIT at 05:02

## 2022-07-28 RX ADMIN — NYSTATIN 1000000 UNITS: 100000 SUSPENSION ORAL at 13:54

## 2022-07-28 RX ADMIN — Medication 5 MG: at 13:54

## 2022-07-28 RX ADMIN — VANCOMYCIN HYDROCHLORIDE 125 MG: KIT at 21:03

## 2022-07-28 RX ADMIN — PREDNISONE 12.5 MG: 2.5 TABLET ORAL at 10:19

## 2022-07-28 RX ADMIN — INSULIN GLARGINE 15 UNITS: 100 INJECTION, SOLUTION SUBCUTANEOUS at 21:28

## 2022-07-28 RX ADMIN — NYSTATIN: 100000 CREAM TOPICAL at 21:07

## 2022-07-28 RX ADMIN — MYCOPHENOLATE MOFETIL 500 MG: 200 POWDER, FOR SUSPENSION ORAL at 21:03

## 2022-07-28 RX ADMIN — LIDOCAINE HYDROCHLORIDE 1 ML: 10 INJECTION, SOLUTION EPIDURAL; INFILTRATION; INTRACAUDAL; PERINEURAL at 15:00

## 2022-07-28 RX ADMIN — NYSTATIN 1000000 UNITS: 100000 SUSPENSION ORAL at 21:03

## 2022-07-28 RX ADMIN — METOPROLOL TARTRATE 25 MG: 25 TABLET, FILM COATED ORAL at 10:20

## 2022-07-28 RX ADMIN — ACETYLCYSTEINE 2 ML: 200 SOLUTION ORAL; RESPIRATORY (INHALATION) at 08:17

## 2022-07-28 RX ADMIN — INSULIN GLARGINE 15 UNITS: 100 INJECTION, SOLUTION SUBCUTANEOUS at 10:22

## 2022-07-28 RX ADMIN — LEVALBUTEROL HYDROCHLORIDE 1.25 MG: 1.25 SOLUTION RESPIRATORY (INHALATION) at 08:17

## 2022-07-28 RX ADMIN — VANCOMYCIN HYDROCHLORIDE 125 MG: KIT at 10:23

## 2022-07-28 RX ADMIN — INSULIN ASPART 1 UNITS: 100 INJECTION, SOLUTION INTRAVENOUS; SUBCUTANEOUS at 13:53

## 2022-07-28 RX ADMIN — INSULIN ASPART 3 UNITS: 100 INJECTION, SOLUTION INTRAVENOUS; SUBCUTANEOUS at 16:28

## 2022-07-28 RX ADMIN — CALCIUM CARBONATE 600 MG (1,500 MG)-VITAMIN D3 400 UNIT TABLET 1 TABLET: at 10:20

## 2022-07-28 RX ADMIN — NYSTATIN: 100000 CREAM TOPICAL at 10:24

## 2022-07-28 RX ADMIN — CALCIUM CARBONATE 600 MG (1,500 MG)-VITAMIN D3 400 UNIT TABLET 1 TABLET: at 17:32

## 2022-07-28 RX ADMIN — TACROLIMUS 5.5 MG: 5 CAPSULE ORAL at 10:22

## 2022-07-28 RX ADMIN — Medication 40 MG: at 21:03

## 2022-07-28 RX ADMIN — ACETYLCYSTEINE 2 ML: 200 SOLUTION ORAL; RESPIRATORY (INHALATION) at 12:10

## 2022-07-28 RX ADMIN — Medication 5 MG: at 00:21

## 2022-07-28 RX ADMIN — ACETAMINOPHEN 975 MG: 325 TABLET, FILM COATED ORAL at 08:31

## 2022-07-28 RX ADMIN — PREDNISONE 12.5 MG: 2.5 TABLET ORAL at 21:15

## 2022-07-28 RX ADMIN — HEPARIN SODIUM 1050 UNITS/HR: 10000 INJECTION, SOLUTION INTRAVENOUS at 02:40

## 2022-07-28 RX ADMIN — LEVALBUTEROL HYDROCHLORIDE 1.25 MG: 1.25 SOLUTION RESPIRATORY (INHALATION) at 21:24

## 2022-07-28 RX ADMIN — Medication 1 PACKET: at 13:57

## 2022-07-28 RX ADMIN — ACETAZOLAMIDE 500 MG: 500 INJECTION, POWDER, LYOPHILIZED, FOR SOLUTION INTRAVENOUS at 17:32

## 2022-07-28 RX ADMIN — Medication 5 MG: at 21:03

## 2022-07-28 RX ADMIN — NYSTATIN 1000000 UNITS: 100000 SUSPENSION ORAL at 17:32

## 2022-07-28 RX ADMIN — MYCOPHENOLATE MOFETIL 500 MG: 200 POWDER, FOR SUSPENSION ORAL at 10:21

## 2022-07-28 RX ADMIN — ACETAMINOPHEN 975 MG: 325 TABLET, FILM COATED ORAL at 21:03

## 2022-07-28 RX ADMIN — HYDROXYZINE HYDROCHLORIDE 50 MG: 25 TABLET ORAL at 08:31

## 2022-07-28 RX ADMIN — Medication 5 MG: at 08:31

## 2022-07-28 RX ADMIN — VALGANCICLOVIR HYDROCHLORIDE 450 MG: 50 POWDER, FOR SOLUTION ORAL at 10:22

## 2022-07-28 RX ADMIN — INSULIN ASPART 3 UNITS: 100 INJECTION, SOLUTION INTRAVENOUS; SUBCUTANEOUS at 05:04

## 2022-07-28 RX ADMIN — THIAMINE HCL TAB 100 MG 100 MG: 100 TAB at 10:20

## 2022-07-28 RX ADMIN — Medication 1 LOZENGE: at 13:54

## 2022-07-28 RX ADMIN — ONDANSETRON 4 MG: 4 TABLET, ORALLY DISINTEGRATING ORAL at 10:19

## 2022-07-28 RX ADMIN — ACETAMINOPHEN 975 MG: 325 TABLET, FILM COATED ORAL at 13:53

## 2022-07-28 ASSESSMENT — ACTIVITIES OF DAILY LIVING (ADL)
ADLS_ACUITY_SCORE: 30

## 2022-07-28 NOTE — PROVIDER NOTIFICATION
07/28/22 1455   Midline Catheter Double Lumen   Placement Date/Time: 07/28/22 (c) 1453   Catheter Brand: Bard  Size (Fr): 5 Fr  Description: (c) Non - valved (open ended);Other  Lot #: [id number]  Full barrier precautions done: Yes, hand hygiene, sterile gown, sterile gloves, mask, cap, full body jocelyne...   Site Assessment WDL   External Cath Length (cm) 2 cm   Initial Extremity Circumference (cm) 29 cm   Dressing Intervention Chlorhexidine patch;Transparent;Securing device;New dressing   Dressing Change Due 08/04/22   Purple - Status blood return noted;saline locked   Purple - Cap Change Due 08/01/22   Red - Status blood return noted;saline locked   Red - Cap Change Due 08/01/22   Extravasation? No

## 2022-07-28 NOTE — CARE PLAN
Respiratory Care Note    Patient ordered on both flutter & percussor. Complaining of pain around chest tube sites which interferes with percussor use and effectiveness.    If aggressive pulmonary toileting is desired, recommend changing to Metaneb instead.

## 2022-07-28 NOTE — PROGRESS NOTES
[facility]    Medicine Progress Note - Hospitalist Service, GOLD TEAM 10    Date of Admission:  6/28/2022    Assessment & Plan  Sofie Rodriguez is a 62 year old female admitted on 6/28/2022. She has PMH of end stage COPD s/p b/l lung transplant on 6/28/2022, HTN, HFpEF, h/o hepC, oteopenia, and former methamphetamine use, and she was transferred to [place] [address] Medicine from MICU on 7/15/2022. She was admitted to the MICU on 7/13/20222 with prior hospital course complicated by left upper extremity acute limb ischemia s/p left radial thrombectomy on 7/1/2022. She was primarily treated for acute hypercapnic respiratory failure on intermittent BIPAP with worsening BACILIO while in the MICU. Breathing is improving, but patient has severely delayed gastric emptying found on NM study. PEGJ placement ordered, pending supply chain issue resolution.       Today's Plan:  - 7/27: s/p IR placed PEGJ    G G to [place] and J for feeds  - Heparin gtt to resume post PEGJ, DOAC prior to discharge   > 7/29: RAPS assessment for block L chest due to pain, DO NOT HOLD Heparin.  - Continue to encourage OOB and up in chair  - BIPAP at night  - Follow-up pleural fluid studies for L Apical Fluid Collection, Pigtail by IR 7/25  - PO Vancomycin for CDiff -> to end 7/28.  - Dose Diamox and consider diuresis tomorrow pending bicarb levels     End stage COPD s/p BOLT 6/28/2022  Acute hypercapnic hypoxic respiratory failure (improving), likely 2/2 decreased central respiratory drive vs respiratory muscle weakness and some pulmonary edema / fluid Transplanted 6/28. formal SNIFF test was performed on 7/14 showed R hemidiaphragm palsy. Of note transplanted lungs were also considered small for body size and could contribute to decreased respiratory compensation for hypercarbia. VBG relatively stable, most recent pCO2 74 (7/22).   - Continue BIPAP at night   - Simple mask w intermittent BIPAP for night and daytime  naps; goal to keep O2sat above 92%  - f/u myasthenia gravis panel  (pending from 7/14: negative)  - oxycodone 2.5-5mg q4h PRN   - encourage activity and getting up in bed; PT/OT participation  - encourage chest physiotherapy QID    Immunosuppression:  - Prednisone 12.5 BID  - Tacrolimus 5.5mg BID (trough goal 8-12)   -  BID  Prophylasxis:  - CMV - Valgancyclover  - Thrush - PO nysatin  - PJP - TMP-SMX (currently held given BACILIO); dapsone started 7/18 at 50mg qMWF (will try to increase to daily on 7/25 per pulm)     Pleural Effusion, Right and Left  - 1 R chest tube (basilar) in place currently (pericardial drain was removed 7/15, L basilar was removed 7/20). CT chest 7/14 showing unchanged bilateral effusions and unchanged biapical pneumothoraces with resolved left basilar pneumothorax; bibasilar chest tubes in place with pericardial drain (which was removed 7/15).   - daily CXR  - RIGHT Chest Tube - still with output  - LEFT Apical Fluid collection - IR targeting via CT guided pigtail placement     Hypotension, resolved  H/o HTN  H/o HFpEF  Likely vasoplegia post operatively. Last echo 7/7 normal EF with good LV function. S/p hydrocortisone 7/6-7/9 and fludrocortisone 7/6-7/11 without significant improvement.  - off midorine 7/19  - Holding PTA lasix 20mg daily     C.diff - vanco started 7/12, may need prolonged course given IS  Abdominal pain - due to volume / c diff / gastro paresis   Severe Gastroparesis - gastric emptying study 7/20  - PO vanc 125mg QID 7/12 to present  - gastric emptying study 7/20 showed delayed gastric emptying with retention of 95% of stomach contents after 4 hours; please keep patient NPO except tube feeds and meds  - Simethicone scheduled  - 7/27: PEGJ     NJ tube  Feeding regimen:   - Adult Formula Drip Feeding: Continuous Novasource Renal; Nasojejunal; Goal Rate: 35; initiate at goal rate; mL/hr; Medication - Feeding Tube Flush Frequency: At least 15-30 mL water before and after  medication administration and with tube clogging     BACILIO  Hypermagnesemia  Hyperphosphatemia  Baseline renal function was normal prior to surgery. New onset renal failure after transplant, likely multifactorial due to pre-renal hypotension, less likely nephrotoxic agents. Overall kidney function improving. Today, Cr fluctuating but BUN increasing, with unclear urine output (7/22).   - HD cath removed 7/22, trend metabolites     Tachyarrthymia  Paroxysmal afib  Paroxysmal aflutter/AT  SVT vs afib.Had an occurrence during HD on 7/14. Had afib with RVR to 200s on 7/17. EP consult placed.asmyptomatic and stable during episodes. Aflutter episode (7/17) with transfer. Vagal maneuver responsive at time. No recent tachyarrhythmia episodes (7/22).   - Per EP: patient has had episodes of possible flutter (vs AT with 2:1 conduction) and afib with RVR  - heparin drip and transition to DOAC after PEGJ placement and chest tubes resolved (CHADS-VASc score of 2)  - On telemetry  - On metoprolol tartrate 25mg BID  - Discharge on ziopatch for 14 days with EP follow up in 1-2 months after     Stress-induced hyperglycemia  -200s over past day.   - on 15U glargine BID; continue today and re-evaluate as needed     Acute Blood Loss Anemia, stable  Initially from acute blood loss. Hb dropped to 6.8 on 7/14, requiring 1U pRBC. Post-transfusion Hb 9.4 > 8.3 > 8.6 > 8.7 (7/18).   - continue to monitor  - transfuse for hgb<7        Diet: NPO per Anesthesia Guidelines for Procedure/Surgery Except for: Meds  Adult Formula Drip Feeding: Continuous Novasource Renal; Jejunostomy; Goal Rate: 35; mL/hr; Medication - Feeding Tube Flush Frequency: At least 15-30 mL water before and after medication administration and with tube clogging; Amount to Send (Nutri...    DVT Prophylaxis: heparin  Dong Catheter: Not present  Central Lines: None  Cardiac Monitoring: None  Code Status: Full Code      Disposition Plan        The patient's care was  discussed with the Patient and Patient's Family.    Catalino Pantoja MD  Hospitalist Service, GOLD TEAM 10  [facility]  Securely message with the Vocera Web Console (learn more here)  Text page via [facility] Paging/Directory   Please see signed in provider for up to date coverage information      Clinically Significant Risk Factors Present on Admission                      ______________________________________________________________________    Interval History   Seen today for follow up: Post lung transplant, pleural effusion, chest tubes    No acute overnight events per patient or patient's family.    Pt reports ongoing, recurrent L pigtail site pain      As of today/at time of my visit:  Patient denies any headache, sore throat  Patient denies any sleeping disturbance  Patient denies any nausea or vomiting  Patient reports no abdominal pain  Patient denies any chest pain  Patient reports no arm or leg edema      Data reviewed today: I reviewed all medications, new labs and imaging results over the last 24 hours. I personally reviewed no images or EKG's today.    Physical Exam   Vital Signs: Temp: 98.2  F (36.8  C) Temp src: Axillary BP: 115/68 Pulse: 105   Resp: 16 SpO2: 100 % O2 Device: Nasal cannula Oxygen Delivery: 1 LPM  Weight: 165 lbs 9.05 oz  General: non-distressed adult  HEENT: Normocephalic, atraumatic, pupils equal round reactive, membranes moist. NGT, NJT  CV: normal capillary refill, heart regular rate and rhythm, tele  Respiratory: Non-labored breathing, bronchovesicular lung sounds, R Chest Tube noted, draining serosangiunous. L apical chest tube in place  Abdominal: soft, mildly tender in the epigastrium, no rebound, no guarding, no rigidity, +bowel sounds  Genitourinary: no suprapubic tenderness  Musculoskeletal: normal bulk and tone  Skin: no rash, normal turgor  Neurologic: no facial droop, moving upper and lower extremities spontaneously, sensation in  tact to light touch on extremities  Psychiatric: normal mood and affect    Data   Recent Labs   Lab 07/28/22  1352 07/28/22  1049 07/28/22  0925 07/27/22  0801 07/27/22  0646 07/26/22  1245 07/26/22  0735 07/25/22  0751 07/25/22  0648   WBC  --   --  3.9*  --  3.1*  --  3.4*  --  4.0   HGB  --   --  8.7*  --  8.3*  --  7.1*  --  7.3*   MCV  --   --  110*  --  107*  --  106*  --  107*   PLT  --   --  240  --  235  --  234  --  267   INR  --   --   --   --  0.87  --   --   --   --    NA  --   --  144  --  143  --  140  --  143   POTASSIUM  --   --  4.1  --  4.9  --  4.3  --  4.6   CHLORIDE  --   --  90*  --  90*  --  90*  --  93*   CO2  --   --  >50*  --  45*  --  47*  --  47*   BUN  --   --  88.1*  --  84.6*  --  89.2*  --  91.1*   CR  --   --  1.44*  --  1.35*  --  1.25*  --  1.20*   ANIONGAP  --   --   --   --  8  --  3*  --  3*   ESTUARDO  --   --  9.6  --  9.7  --  9.5  --  9.5   * 115* 120*   < > 85   < > 166*   < > 172*   ALBUMIN  --   --  3.0*  --   --   --   --   --  2.6*   PROTTOTAL  --   --  5.2*  --   --   --   --   --  4.7*   BILITOTAL  --   --  0.2  --   --   --   --   --  0.2   ALKPHOS  --   --  60  --   --   --   --   --  61   ALT  --   --  12  --   --   --   --   --  15   AST  --   --  17  --   --   --   --   --  19    < > = values in this interval not displayed.

## 2022-07-28 NOTE — PROCEDURES
[facility]    Double Lumen Midline Placement    Date/Time: 7/28/2022 2:55 PM  Performed by: Richie Abraham RN  Authorized by: Catalino Pantoja MD   Indications: vascular access      UNIVERSAL PROTOCOL   Site Marked: Yes  Prior Images Obtained and Reviewed:  Yes  Required items: Required blood products, implants, devices and special equipment available    Patient identity confirmed:  Verbally with patient, arm band, provided demographic data and hospital-assigned identification number  Patient was reevaluated immediately before administering moderate or deep sedation or anesthesia  Confirmation Checklist:  Patient's identity using two indicators, relevant allergies, procedure was appropriate and matched the consent or emergent situation and correct equipment/implants were available  Time out: Immediately prior to the procedure a time out was called    Universal Protocol: the Joint Commission Universal Protocol was followed    Preparation: Patient was prepped and draped in usual sterile fashion       ANESTHESIA    Anesthesia: See MAR for details  Local Anesthetic:  Lidocaine 1% without epinephrine  Anesthetic Total (mL):  1      SEDATION    Patient Sedated: No        Preparation: skin prepped with ChloraPrep  Skin prep agent: skin prep agent completely dried prior to procedure  Sterile barriers: maximum sterile barriers were used: cap, mask, sterile gown, sterile gloves, and large sterile sheet  Hand hygiene: hand hygiene performed prior to central venous catheter insertion  Type of line used: Midline (Power injectable)  Catheter type: double lumen  Lumen type: non-valved  Catheter size: 5 Fr  Brand: Bard  Lot number: [id number]  Placement method: venipuncture, MST and ultrasound  Number of attempts: 1  Difficulty threading catheter: no  Successful placement: yes  Orientation: left    Location: basilic vein (vein diameter - 0.52 cm)  Arm circumference: adults 10  cm  Extremity circumference: 29  Visible catheter length: 2  Total catheter length: 20  Dressing and securement: alcohol impregnated caps, chlorhexidine patch applied, transparent dressing, sterile dressing applied, statlock and site cleansed  Post procedure assessment: blood return through all ports and free fluid flow  PROCEDURE   Patient Tolerance:  Patient tolerated the procedure well with no immediate complicationsDescribe Procedure: Midline IV catheter is OK to use for NON irritant/vesicant IV medications. NOT recommended for lab draws & infusion of incompatible medications.

## 2022-07-28 NOTE — PLAN OF CARE
Neuro/Musculoskeletal:  A&Ox4. Generalized weakness.   Cardiac:  SR  VSS.  Afebrile.    Respiratory:  Sating above the 90s on 2L NC and Bipap overnight 15/5 at 30%. .  GI/:  Urine output mixed with loose Bms. Diarrhea continues had 4 bms on noc.    Diet/Appetite: TF  Activity:  Assist of 2 to the commode due to multiple lines and drains and dyspnea with activity.   Pain:  Denies  Skin:  No new deficits noted.   LDAs + Drips/IVF:  Right PIV running heparin gtt  rate @ 1050 units/hr. Ctx2    Protocols/Labs:  Morning daily labs. CXR daily.     Plan:  Cont with plan of care.

## 2022-07-28 NOTE — PROGRESS NOTES
Brief CVTS Progress Note  07/28/2022    Sofie Rodriguez is a 62 year old female with PMH significant for oxygen-dependent COPD, HF, HTN, HCV, osteopenia, and methamphetamine abuse in remission.  She is now s/p BSLT by Dr. Sunshine on 6/28/2022.  Her post-operative course has been complicated by LUE critical limb ischemia now s/p left radial thrombectomy on 7/1/2022, hypercapneic respiratory insufficiency, BACILIO, and dysphagia.    A/P:  S/p BLST on 6/28/2022, post op course c/b LUE critical limb ischemia now s/p left radial thrombectomy on 7/1/2022, hypercapneic respiratory insufficiency, BACILIO, and dysphagia.    - Chest tube output 242 over last 24 hours and 270 since midnight. Output still too high for removal considerations. Goal output is <200 mL for three consecutive days.  - Total output 917 mL with 525 urinary output. Agree with Continued I/O and diuresis.  - Chest x-ray reviewed. Difficult to assess due to image quality. Left sided pleural effusion appears worse than on 7/26. Pulmonary opacities also appears worse on left more than right.   - Recommend IR consult for thoracentesis of left pleural effusion  - recommend Non-contrast CT scan of chest for better understanding of L pleural effusion  - IR-placed left apical pigtail chest tube on 7/25; management per Pulmonology  - Per surgeon, lungs did not fill chest space  - Daily wound care per nursing:  Wound cleanser to clamshell incision, pat dry, may be left open to air; keep incision C/D/I  - Remainder of plan per Pulm Transplant/Medicine teams    CVTS will continue to follow for surgical chest tube and clamshell incision management.    Discussed with Surgeon, Dr. Sunshine via written and verbal commnication.     Tracie Zhou PA-c  Pager: [phone]  9:49 AM July 28, 2022           Interval History:     No overnight events.  States pain is well managed on current regimen. Slept well overnight.  Breathing well without complaints.  Denies chest pain, palpitations,  "dizziness, syncopal symptoms, fevers, chills, myalgias, or sternal popping/clicking.         Physical Exam:   Blood pressure 138/74, pulse 105, temperature 99.1  F (37.3  C), temperature source Axillary, resp. rate 16, height 1.575 m (5' 2\"), weight 75.1 kg (165 lb 9.1 oz), SpO2 100 %, not currently breastfeeding.  Vitals:    07/25/22 1810 07/26/22 0500 07/27/22 0500   Weight: 72.5 kg (159 lb 14.4 oz) 74 kg (163 lb 2.3 oz) 75.1 kg (165 lb 9.1 oz)       Gen: A&Ox4, NAD  Neuro: no focal deficits   CV: tachycardic, normal S1 S2, no murmurs, rubs or gallops. No appreciable JVD    Pulm: CTA, no wheezing or rhonchi, normal breathing on 1-2 L/m  Incision: clean, dry, intact, no erythema, sternum stable  Tubes/drain sites: dressing clean and dry         Data:    Imaging:  reviewed recent imaging    Chest x-ray  Worsening left Pleural effusion     Labs:  CBC  Recent Labs   Lab 07/27/22  0646 07/26/22  0735 07/25/22  0648 07/24/22  0754   WBC 3.1* 3.4* 4.0 5.8   RBC 2.61* 2.25* 2.31* 2.35*   HGB 8.3* 7.1* 7.3* 7.3*   HCT 27.8* 23.8* 24.7* 24.8*   * 106* 107* 106*   MCH 31.8 31.6 31.6 31.1   MCHC 29.9* 29.8* 29.6* 29.4*   RDW 16.6* 17.0* 17.0* 17.0*    234 267 256     CMP:  Last Comprehensive Metabolic Panel:  Sodium   Date Value Ref Range Status   07/27/2022 143 136 - 145 mmol/L Final   06/30/2021 138 133 - 144 mmol/L Final     Potassium   Date Value Ref Range Status   07/27/2022 4.9 3.4 - 5.3 mmol/L Final   07/06/2022 5.2 3.4 - 5.3 mmol/L Final   06/30/2021 4.4 3.4 - 5.3 mmol/L Final     Chloride   Date Value Ref Range Status   07/27/2022 90 (L) 98 - 107 mmol/L Final   04/29/2022 95 94 - 109 mmol/L Final   06/30/2021 100 94 - 109 mmol/L Final     Carbon Dioxide   Date Value Ref Range Status   06/30/2021 38 (H) 20 - 32 mmol/L Final     Carbon Dioxide (CO2)   Date Value Ref Range Status   07/27/2022 45 (H) 22 - 29 mmol/L Final   04/29/2022 42 (H) 20 - 32 mmol/L Final     Anion Gap   Date Value Ref Range Status "   07/27/2022 8 7 - 15 mmol/L Final   04/29/2022 3 3 - 14 mmol/L Final   06/30/2021 <1 (L) 3 - 14 mmol/L Final     Glucose   Date Value Ref Range Status   04/29/2022 112 (H) 70 - 99 mg/dL Final   06/30/2021 117 (H) 70 - 99 mg/dL Final     GLUCOSE BY METER POCT   Date Value Ref Range Status   07/28/2022 192 (H) 70 - 99 mg/dL Final     Urea Nitrogen   Date Value Ref Range Status   07/27/2022 84.6 (H) 8.0 - 23.0 mg/dL Final   04/29/2022 21 7 - 30 mg/dL Final   06/30/2021 30 7 - 30 mg/dL Final     Creatinine   Date Value Ref Range Status   07/27/2022 1.35 (H) 0.51 - 0.95 mg/dL Final   06/30/2021 0.85 0.52 - 1.04 mg/dL Final     GFR Estimate   Date Value Ref Range Status   07/27/2022 45 (L) >60 mL/min/1.73m2 Final     Comment:     Effective December 21, 2021 eGFRcr in adults is calculated using the 2021 CKD-EPI creatinine equation which includes age and gender (Deejay et al., NEJM, DOI: 10.1056/YCUZjz0292648)   06/30/2021 75 >60 mL/min/[1.73_m2] Final     Comment:     Non  GFR Calc  Starting 12/18/2018, serum creatinine based estimated GFR (eGFR) will be   calculated using the Chronic Kidney Disease Epidemiology Collaboration   (CKD-EPI) equation.       Calcium   Date Value Ref Range Status   07/27/2022 9.7 8.8 - 10.2 mg/dL Final   06/30/2021 9.6 8.5 - 10.1 mg/dL Final     Bilirubin Total   Date Value Ref Range Status   07/25/2022 0.2 <=1.2 mg/dL Final   06/14/2021 0.3 0.2 - 1.3 mg/dL Final     Alkaline Phosphatase   Date Value Ref Range Status   07/25/2022 61 35 - 104 U/L Final   06/14/2021 87 40 - 150 U/L Final     ALT   Date Value Ref Range Status   07/25/2022 15 10 - 35 U/L Final   06/14/2021 18 0 - 50 U/L Final     AST   Date Value Ref Range Status   07/25/2022 19 10 - 35 U/L Final   06/14/2021 20 0 - 45 U/L Final     BMP  Recent Labs   Lab 07/28/22  0504 07/28/22  0026 07/27/22  2034 07/27/22  1536 07/27/22  0801 07/27/22  0646 07/26/22  1245 07/26/22  0735 07/25/22  0751 07/25/22  0648 07/24/22  0817  07/24/22  0754   NA  --   --   --   --   --  143  --  140  --  143  --  142   POTASSIUM  --   --   --   --   --  4.9  --  4.3  --  4.6  --  4.3   CHLORIDE  --   --   --   --   --  90*  --  90*  --  93*  --  93*   ESTUARDO  --   --   --   --   --  9.7  --  9.5  --  9.5  --  9.7   CO2  --   --   --   --   --  45*  --  47*  --  47*  --  45*   BUN  --   --   --   --   --  84.6*  --  89.2*  --  91.1*  --  95.9*   CR  --   --   --   --   --  1.35*  --  1.25*  --  1.20*  --  1.32*   * 133* 163* 118*   < > 85   < > 166*   < > 172*   < > 144*    < > = values in this interval not displayed.     INR  Recent Labs   Lab 07/27/22  0646   INR 0.87      Hepatic Panel  Recent Labs   Lab 07/25/22  0648 07/21/22  0955   AST 19 17   ALT 15 18   ALKPHOS 61 74   BILITOTAL 0.2 0.2   ALBUMIN 2.6* 2.8*     GLUCOSE:   Recent Labs   Lab 07/28/22  0504 07/28/22  0026 07/27/22  2034 07/27/22  1536 07/27/22  1214 07/27/22  0801   * 133* 163* 118* 78 83

## 2022-07-28 NOTE — PROGRESS NOTES
Pulmonary Medicine  Cystic Fibrosis - Lung Transplant Team  Progress Note  2022       Patient: Sofie Rodriguez  MRN: [id number]  : 1960 (age 62 year old)  Transplant: 2022 (Lung), POD#30  Admission date: 2022    Assessment & Plan:     Sofie Rodriguez is a 62 year old female with a PMH significant for end-stage COPD, HTN, HFpEF, Mycobacterium peregrinum colonization, h/o hepatitis C, HECTOR s/p LEEP procedure, osteopenia, and former methamphetamine use.  Pt. is now s/p BSLT on 22, lungs slightly undersized.   Persistent low dose pressor needs post-op through 7/10.  Extubated POD #2 but with persistent hypercapnia, mostly compensated and only slightly improved with intermittent BiPAP.  Also with left radial artery thrombus (presumed secondary to arterial line) s/p thrombectomy /, BACILIO, and C diff.  Rehabilitation and airway clearance initially limited by dysrhythmia and gastric discomfort, now with gradual improvement.  Remains on 0.5-2L oxymask (SpO2 high 80s on RA) during the day and BiPAP overnight.  S/p GJ tube placement in IR .      Today's recommendations:  - DSA, EBV, and donor risk labs () pending  - Decreased nebs and chest physiotherapy to TID  - Wean supplemental oxygen to maintain SpO2 >92%, continue BiPAP overnight/with naps, VBG ordered every morning (still awaiting collection today)  - Following pending pleural cultures, right chest tube to remain given output (followed by CVTS), left chest tube to remain given output (placed by IR)  - CXR daily as below  - Repeat chest CT without contrast today to evaluate pleural effusions (especially left) and need for additional IR intervention, reviewed with Dr. Franks and likely not amenable to left sided thoracentesis/additional chest tube placement  - Repeat inspection bronch next week (to evaluate anastomoses), timing TBD  - Tacrolimus level difficult to interpret at 16h, repeat level ordered tomorrow  - Prednisone tapered  today, next due 8/4 (not yet ordered)  - Consider increasing dapsone to daily starting tomorrow if hemoglobin remains >8  - IgG low, recommend IVIG with premedications in the next couple days pending volume status (not yet ordered)   - Defer transition to DOAC at this time given chest tubes  - PO vancomycin ordered through today, will need to revisit resuming if to begin on ABX     S/p bilateral sequential lung transplant (BSLT) for end stage COPD:  Persistent hypercapneic respiratory failure:   Persistent hypotension, Resolved:   Bilateral hydroPTX:  Right hemidiaphragm palsy: Explant pathology with severe emphysema with subpleural bullae formation, changes of chronic bronchitis, subpleural scars and patchy pulmonary edema; benign hilar lymph nodes.  No evidence of PGD post-op.  Extubated 6/30.  Persistent hypercapnia without dyspnea, appears to be a respiratory drive problem.  Persistent low dose pressors weaned off 7/10 and scheduled midodrine topped 7/19.  SNIFF (7/14) notable for right hemidiaphragm palsy.  Chest CT (7/18) with bilateral biapical effusions R>L and improved LLL atelectasis, also with LLL hydroPTX and bilateral PTX; bilateral chest tubes not communicating well with loculated effusion areas.  Bronch (7/19) with slight graying of mucosa noted at right anastomosis and scant secretions (slightly increased in RLL).  Left surgical chest tube removed 7/20.  Chest CT (7/23) with increased loculated fluid within the left hemithorax with specks of air density in the loculated fluid, similar appearing loculated right hydroPTX with minimal decrease in fluid component (right chest tube appears to be outside the loculated hydroPTX), increased size of pleural based lesion in MARLON, and mild subcutaneous emphysema along right chest tube with minimal along tract of removed left chest tube.  S/p left apical chest tube placed by IR 7/25, exudative.  On 0.5-2L oxymask while awake (SpO2 high 80s on RA), BiPAP overnight  (ongoing hypercapnia).  - DSA one month post-transplant (7/28, pending)  - Ammonia monitoring twice weekly (screening for hyperammonemia post-lung transplant)  - Nebs: levalbuterol and Mucomyst QID --> decrease to TID (ordered)  - Pulmonary toilet with chest physiotherapy QID --> decrease to TID (ordered)  - Aerobika and incentive spirometry hourly while awake  - Supplemental oxygen as needed to maintain SpO2 >92% (wean as able), BiPAP overnight/with naps given persistent hypercapnia, VBG every morning (ordered, still awaiting collection 7/28)  - Right chest tube (managed by surgical team) and left apical chest tube (placed by IR), remain with low-moderate output  - CXR daily while chest tubes remain  - Chest CT without contrast 7/28 to evaluate pleural effusions (especially left) and need for additional IR intervention --> read with small left loculated anteroinferior hydroPTX (increased from prior), stable loculated right hydroPTX (with right basilar chest tube that does not appear to be continuous with any fluid collections), stable MARLON subpleural lesion, and enlarged bilateral hilar lymph nodes (reviewed with Dr. Franks) --> likely not amenable to left sided thoracentesis/additional chest tube placement  - Volume management per primary team  - Repeat inspection bronch next week (to evaluate anastomoses), timing TBD, will need to be NPO 6h prior and heparin drip held 4h prior  - Encourage activity including up to the chair and PT/OT  - Pain management per primary team     Immunosuppression:  Induction therapy with basiliximab (and high dose IV steroid) given intraoperatively, repeating basiliximab dose on POD#4.  - Tacrolimus 5.5 mg BID (via G tube 7/28, peak level appropriate 7/11).  Goal level 8-12.  Level (7/28) subtherapeutic at 5.8 although difficult to interpret as 16h level, repeat level 7/29 (ordered).   -  mg BID (decreased 7/27, GI symptoms/leukopenia), consider holding if WBC <3 and will  transition back to PO Myfortic once tolerating PO medications consistently   - Prednisone 12.5 mg BID, next taper due 8/4 (not yet ordered)  Date AM dose (mg) PM dose (mg)   7/28/22 12.5 12.5   8/4/22 12.5 10   8/18/22 10 10   9/15/22 10 7.5   10/13/22 7.5 7.5   11/10/22 7.5 5   12/8/22 5 5   1/5/23 5 2.5      Prophylaxis:   - Dapsone for PJP ppx (7/18), defer increasing to daily pending hgb stability (once consistently >8, revisit 7/29)  - VGCV for CMV ppx, CMV negative 7/25  - Nystatin for oral candidiasis ppx, 6 month course  - See below for serologies and viral ppx:    Donor Recipient Intervention   CMV status Positive Positive Valganciclovir POD #8-90   EBV status Positive Positive EBV check monthly (7/28, pending)   HSV status N/A Positive Not indicated      ID:  H/o M. peregrinum colonization: Donor bronch cultures (OSH) with Strep beta hemolytic (not group A).  Recipient cultures as below.  Bronch cultures (7/12) NGTD.  - AFB bronch culture (6/28, 6/29, 7/12) NGTD, AFB to be sent on all future bronchs  - Right pleural cultures (7/20) NGTD  - Left pleural cultures (7/25) NGTD     Streptococcus pneumoniae:  Stenotrophomonas maltophilia: Noted in recipient cultures at time of transplant.  S/p ceftazidime 6/28-7/10, vancomycin 7/7-7/8 and 6/28-6/30, and levofloxacin 7/10-7/12 for total 2 week ABX course.    Hypogammaglobulinemia: IgG adequate at time of transplant (1,381) and now low (336) on 7/28.  - Recommend IVIG with premedications in the next couple days pending volume status (not yet ordered)      H/o hepatitis C: Diagnosed in 1980s, 2 mos of treatment, quant negative since 10/2017, last positive 2/20/17 ([phone]).  Glenis positive on 6/2021 with negative HCV PCR.  H/o remote EtOH abuse.  MR elastography (4/27/21) with hepatology review and consult without any concerns post transplant.  Hepatitis C RNA negative and hepatitis C antibody positive (old) on 6/28.     PHS risk criteria donor:  Additional labs  required post-transplant (between 4-8 weeks post-op): Hepatitis B, Hepatitis C, and HIV by SHERI ([id number], pending 7/28).     Other relevant problems managed by primary team:      SVT:   Aflutter with RVR: SVT first noted on 7/14, prior to HD line placement.  Continues intermittently.  Aflutter with RVR to 200 7/17 triggered by activity.  EP consulted 7/17 given persistent tachycardia/dysrhythmia.  Midodrine held 7/19.  - Metoprolol per primary team (started 7/15)  - Heparin drip (7/17), defer transition to DOAC at this time given chest tubes     Left hand ischemia: Radial artery thrombosis identified on duplex doppler.  S/p thrombectomy on 7/2.  Completed high intensity heparin course.  Continue daily aspirin.      BACILIO:   Hyperkalemia: Likely multifactorial including medications (Bactrim, tacrolimus) and hypotension.  Fludrocortisone 7/6-7/11.  Potassium now stable.  S/p non-tunneled HD line 7/14.  Initial iHD run 7/14 limited by afib with RVR vs SVT.  Last iHD run 7/16, line removed 7/22.  - Tacrolimus monitoring as above  - Management per nephrology and primary team     Abdominal pain: Noted 7/15, cramping pain.  ACR with large stool burden in colon, no obstruction or distention.  Some nausea but no emesis.  CT abdomen (7/15) with moderate to large gastric distention, otherwise without obstruction.  NG placed for LIS with moderate to large output for several days.  Increased abdominal pain 7/17 after clamping for 4 hours, tolerated clamping today without issue.  Gastric emptying study (7/20) with severe gastric emptying delay (95% retention at 4 hours).  S/p GJ tube placement in IR 7/27.  - Simethicone prn  - TF management per RD and primary team, G tube to gravity and TF via J tube    C diff infection: Abdominal pain with diarrhea noted 7/8, AXR without dilated bowel, moderate colonic stool burden.  C diff positive 7/11.  Loose stools (on tube feeds) stable.  - PO vancomycin (7/11-7/28) per primary team, will need  "to revisit resuming if to begin on ABX     Hypomagnesemia: Suppressed absorption d/t CNI.   - Continue daily magnesium with replacement protocol prn, schedule oral magnesium supplementation if needed pending improvement in stools    We appreciate the excellent care provided by the [place] [id number] team.  Recommendations communicated via in person rounding and this note.  Will continue to follow along closely, please do not hesitate to call with any questions or concerns.    Patient seen and discussed with Dr. Franks.    Yuliet Aviles PA-C  Inpatient [facility]  Pulmonary CF/Transplant     Subjective & Interval History:     On 1-2L via NC this morning, remains on BiPAP 15/5 with FiO2 30% overnight.  Denies dyspnea at rest, minimal with activity.  Ongoing pain at left chest tube site and now with abdominal pain following GJ tube placement in IR yesterday.  Declining chest physiotherapy due to pain.  Denies nausea or vomiting, having small volume loose stools.      Review of Systems:     C: No fever, no chills, + increased weight  INTEGUMENTARY/SKIN: No rash or obvious new lesions  ENT/MOUTH: No sore throat, no sinus pain, no nasal congestion or drainage  RESP: See interval history  CV: No chest pain, no palpitations, + improving peripheral edema  GI: See interval history  : No dysuria  MUSCULOSKELETAL: See interval history  ENDOCRINE: Blood sugars with adequate control  NEURO: No headache, no numbness or tingling  PSYCHIATRIC: Mood stable    Physical Exam:     All notes, images, and labs from past 24 hours (at minimum) were reviewed.    Vital signs:  Temp: 98.2  F (36.8  C) Temp src: Axillary BP: 115/68 Pulse: 104   Resp: 18 SpO2: 100 % O2 Device: Nasal cannula Oxygen Delivery: 1 LPM Height: 157.5 cm (5' 2\") Weight: 75.1 kg (165 lb 9.1 oz)  I/O:     Intake/Output Summary (Last 24 hours) at 7/28/2022 1431  Last data filed at 7/28/2022 1200  Gross per 24 hour   Intake 325 ml   Output 1340 ml   Net -1015 ml     Constitutional: " Lying in bed, in no apparent distress.   HEENT: Eyes with pink conjunctivae, anicteric.  Oral mucosa moist without lesions.    PULM: Diminished air flow t/o left and to RLL.  No crackles, no rhonchi, no wheezes.  Non-labored breathing on 1L NC.  Bilateral chest tubes without air leak.  CV: Normal S1 and S2.  Tachycardic.  No murmur, gallop, or rub.  Trace BLE edema.   ABD: NABS, soft, + tender t/o, nondistended.    MSK: Moves all extremities.  + muscle wasting.   NEURO: Alert, conversant.   SKIN: Warm, dry.  No rash on limited exam.  Clamshell incision not visualized.   PSYCH: Mood stable.    Lines, Drains, and Devices:  Peripheral IV 07/20/22 Anterior;Right Lower forearm (Active)   Site Assessment WDL 07/28/22 0400   Line Status Infusing 07/28/22 0400   Dressing Intervention New dressing  07/20/22 1548   Phlebitis Scale 0-->no symptoms 07/28/22 0400   Infiltration Scale 0 07/28/22 0400   Number of days: 8     Data:     LABS    CMP:   Recent Labs   Lab 07/28/22  1352 07/28/22  1049 07/28/22  0925 07/28/22  0504 07/27/22  0801 07/27/22  0646 07/26/22  1245 07/26/22  0735 07/25/22  0751 07/25/22  0648 07/24/22  0817 07/24/22  0754   NA  --   --  144  --   --  143  --  140  --  143  --  142   POTASSIUM  --   --  4.1  --   --  4.9  --  4.3  --  4.6  --  4.3   CHLORIDE  --   --  90*  --   --  90*  --  90*  --  93*  --  93*   CO2  --   --  >50*  --   --  45*  --  47*  --  47*  --  45*   ANIONGAP  --   --   --   --   --  8  --  3*  --  3*  --  4*   * 115* 120* 192*   < > 85   < > 166*   < > 172*   < > 144*   BUN  --   --  88.1*  --   --  84.6*  --  89.2*  --  91.1*  --  95.9*   CR  --   --  1.44*  --   --  1.35*  --  1.25*  --  1.20*  --  1.32*   GFRESTIMATED  --   --  41*  --   --  45*  --  49*  --  52*  --  46*   ESTUARDO  --   --  9.6  --   --  9.7  --  9.5  --  9.5  --  9.7   MAG  --   --  2.5*  --   --  2.4*  --  2.3  --  2.4*  --  2.5*   PHOS  --   --  3.8  --   --  4.0  --  3.8  --  4.0  --  4.1   PROTTOTAL  --    --  5.2*  --   --   --   --   --   --  4.7*  --  4.6*   ALBUMIN  --   --  3.0*  --   --   --   --   --   --  2.6*  --   --    BILITOTAL  --   --  0.2  --   --   --   --   --   --  0.2  --   --    ALKPHOS  --   --  60  --   --   --   --   --   --  61  --   --    AST  --   --  17  --   --   --   --   --   --  19  --   --    ALT  --   --  12  --   --   --   --   --   --  15  --   --     < > = values in this interval not displayed.     CBC:   Recent Labs   Lab 07/28/22  0925 07/27/22  0646 07/26/22  0735 07/25/22  0648   WBC 3.9* 3.1* 3.4* 4.0   RBC 2.70* 2.61* 2.25* 2.31*   HGB 8.7* 8.3* 7.1* 7.3*   HCT 29.6* 27.8* 23.8* 24.7*   * 107* 106* 107*   MCH 32.2 31.8 31.6 31.6   MCHC 29.4* 29.9* 29.8* 29.6*   RDW 16.9* 16.6* 17.0* 17.0*    235 234 267       INR:   Recent Labs   Lab 07/27/22  0646   INR 0.87       Glucose:   Recent Labs   Lab 07/28/22  1352 07/28/22  1049 07/28/22  0925 07/28/22  0504 07/28/22  0026 07/27/22  2034   * 115* 120* 192* 133* 163*       Blood Gas:   Recent Labs   Lab 07/27/22  0646 07/26/22  0735 07/25/22  1610   PHV 7.50* 7.45* 7.43   PCO2V 72* 76* 79*   PO2V 52* 50* 40   HCO3V 56* 53* 52*   LINDY 29.1* 26.3* 25.1*   O2PER 100 30 1       Culture Data No results for input(s): CULT in the last 168 hours.    Virology Data:   Lab Results   Component Value Date    FLUAH1 Not Detected 06/29/2022    FLUAH3 Not Detected 06/29/2022    CU4704 Not Detected 06/29/2022    IFLUB Not Detected 06/29/2022    RSVA Not Detected 06/29/2022    RSVB Not Detected 06/29/2022    PIV1 Not Detected 06/29/2022    PIV2 Not Detected 06/29/2022    PIV3 Not Detected 06/29/2022    HMPV Not Detected 06/29/2022       Historical CMV results (last 3 of prior testing):  Lab Results   Component Value Date    CMVQNT Not Detected 07/25/2022     No results found for: CMVLOG    Urine Studies    Recent Labs   Lab Test 07/08/22  0831 07/05/22  1004   URINEPH 5.5 5.5   NITRITE Negative Negative   LEUKEST Negative Negative    WBCU 1 5       Most Recent [facility] Pulmonary Function Testing (FVC/FEV1 only)  FVC-Pre   Date Value Ref Range Status   04/29/2022 1.82 L    11/11/2021 2.17 L    06/14/2021 2.00 L      FVC-%Pred-Pre   Date Value Ref Range Status   04/29/2022 58 %    11/11/2021 70 %    06/14/2021 64 %      FEV1-Pre   Date Value Ref Range Status   04/29/2022 0.51 L    11/11/2021 0.53 L    06/14/2021 0.54 L      FEV1-%Pred-Pre   Date Value Ref Range Status   04/29/2022 20 %    11/11/2021 21 %    06/14/2021 21 %        IMAGING    Recent Results (from the past 48 hour(s))   XR Chest 2 Views    Narrative    Chest 2 views    INDICATION: Interval follow-up, lung transplant, bilateral chest tubes    COMPARISON: Yesterday    FINDINGS: Clamshell sternotomy from prior bilateral lung transplant  again noted. Chest tubes again noted. Heart size normal. Left  perihilar opacity unchanged. Thickening in the right minor fissure  increased. Feeding tube beyond the inferior margin of the image.  Calcification at the aortic knob. No new ectopic air collections.  Continued costophrenic angle blunting and haziness in the left lower  lung.      Impression    IMPRESSION: Continued left pleural effusion and associated  atelectasis. Continued prominent perihilar opacity on the left.  Increased small pleural effusion or atelectasis on the right. Prior  bilateral lung transplantation.    ALVINA HARRY MD         SYSTEM ID:  [id number]   CT Chest w/o Contrast    Impression    RESIDENT PRELIMINARY INTERPRETATION  IMPRESSION:   1.  Small left loculated anteroinferior hydropneumothorax, increased  from prior.  2.  Stable loculated right hydropneumothorax with right basilar chest  tube that does not appear to be continuous with any fluid collections.  3.  Stable left upper lobe subpleural lesion measuring up to 2.0 cm.  Recommend close attention on follow-up.  4.  Enlarged bilateral hilar lymph nodes, likely reactive.

## 2022-07-29 ENCOUNTER — APPOINTMENT (OUTPATIENT)
Dept: GENERAL RADIOLOGY | Facility: CLINIC | Age: 62
DRG: 007 | End: 2022-07-29
Attending: PHYSICIAN ASSISTANT
Payer: MEDICARE

## 2022-07-29 ENCOUNTER — APPOINTMENT (OUTPATIENT)
Dept: PHYSICAL THERAPY | Facility: CLINIC | Age: 62
DRG: 007 | End: 2022-07-29
Attending: THORACIC SURGERY (CARDIOTHORACIC VASCULAR SURGERY)
Payer: MEDICARE

## 2022-07-29 ENCOUNTER — HOSPITAL ENCOUNTER (OUTPATIENT)
Facility: CLINIC | Age: 62
DRG: 007 | End: 2022-07-29
Attending: INTERNAL MEDICINE | Admitting: INTERNAL MEDICINE
Payer: MEDICARE

## 2022-07-29 LAB
ANION GAP SERPL CALCULATED.3IONS-SCNC: 6 MMOL/L (ref 7–15)
BASE EXCESS BLDV CALC-SCNC: 24 MMOL/L (ref -7.7–1.9)
BASOPHILS # BLD AUTO: 0 10E3/UL (ref 0–0.2)
BASOPHILS NFR BLD AUTO: 0 %
BUN SERPL-MCNC: 86.8 MG/DL (ref 8–23)
CALCIUM SERPL-MCNC: 9.2 MG/DL (ref 8.8–10.2)
CHLORIDE SERPL-SCNC: 91 MMOL/L (ref 98–107)
CREAT SERPL-MCNC: 1.5 MG/DL (ref 0.51–0.95)
DEPRECATED HCO3 PLAS-SCNC: 48 MMOL/L (ref 22–29)
EOSINOPHIL # BLD AUTO: 0.1 10E3/UL (ref 0–0.7)
EOSINOPHIL NFR BLD AUTO: 1 %
ERYTHROCYTE [DISTWIDTH] IN BLOOD BY AUTOMATED COUNT: 16.3 % (ref 10–15)
GFR SERPL CREATININE-BSD FRML MDRD: 39 ML/MIN/1.73M2
GLUCOSE BLDC GLUCOMTR-MCNC: 132 MG/DL (ref 70–99)
GLUCOSE BLDC GLUCOMTR-MCNC: 146 MG/DL (ref 70–99)
GLUCOSE BLDC GLUCOMTR-MCNC: 162 MG/DL (ref 70–99)
GLUCOSE BLDC GLUCOMTR-MCNC: 165 MG/DL (ref 70–99)
GLUCOSE BLDC GLUCOMTR-MCNC: 169 MG/DL (ref 70–99)
GLUCOSE BLDC GLUCOMTR-MCNC: 181 MG/DL (ref 70–99)
GLUCOSE SERPL-MCNC: 165 MG/DL (ref 70–99)
HCO3 BLDV-SCNC: 52 MMOL/L (ref 21–28)
HCT VFR BLD AUTO: 23.8 % (ref 35–47)
HGB BLD-MCNC: 7 G/DL (ref 11.7–15.7)
IMM GRANULOCYTES # BLD: 0.1 10E3/UL
IMM GRANULOCYTES NFR BLD: 3 %
LYMPHOCYTES # BLD AUTO: 0.2 10E3/UL (ref 0.8–5.3)
LYMPHOCYTES NFR BLD AUTO: 4 %
MAGNESIUM SERPL-MCNC: 2.5 MG/DL (ref 1.7–2.3)
MCH RBC QN AUTO: 32 PG (ref 26.5–33)
MCHC RBC AUTO-ENTMCNC: 29.4 G/DL (ref 31.5–36.5)
MCV RBC AUTO: 109 FL (ref 78–100)
MONOCYTES # BLD AUTO: 0.4 10E3/UL (ref 0–1.3)
MONOCYTES NFR BLD AUTO: 7 %
NEUTROPHILS # BLD AUTO: 4.2 10E3/UL (ref 1.6–8.3)
NEUTROPHILS NFR BLD AUTO: 85 %
NRBC # BLD AUTO: 0 10E3/UL
NRBC BLD AUTO-RTO: 0 /100
O2/TOTAL GAS SETTING VFR VENT: 30 %
PCO2 BLDV: 78 MM HG (ref 40–50)
PH BLDV: 7.43 [PH] (ref 7.32–7.43)
PHOSPHATE SERPL-MCNC: 4.3 MG/DL (ref 2.5–4.5)
PLATELET # BLD AUTO: 265 10E3/UL (ref 150–450)
PO2 BLDV: 37 MM HG (ref 25–47)
POTASSIUM SERPL-SCNC: 4.5 MMOL/L (ref 3.4–5.3)
RBC # BLD AUTO: 2.19 10E6/UL (ref 3.8–5.2)
SARS-COV-2 RNA RESP QL NAA+PROBE: NEGATIVE
SODIUM SERPL-SCNC: 145 MMOL/L (ref 136–145)
TACROLIMUS BLD-MCNC: 15.6 UG/L (ref 5–15)
TME LAST DOSE: ABNORMAL H
TME LAST DOSE: ABNORMAL H
UFH PPP CHRO-ACNC: 0.2 IU/ML
UFH PPP CHRO-ACNC: 0.35 IU/ML
UFH PPP CHRO-ACNC: 0.53 IU/ML
WBC # BLD AUTO: 4.9 10E3/UL (ref 4–11)

## 2022-07-29 PROCEDURE — 250N000013 HC RX MED GY IP 250 OP 250 PS 637: Performed by: SURGERY

## 2022-07-29 PROCEDURE — 250N000012 HC RX MED GY IP 250 OP 636 PS 637: Performed by: INTERNAL MEDICINE

## 2022-07-29 PROCEDURE — 250N000011 HC RX IP 250 OP 636

## 2022-07-29 PROCEDURE — 85520 HEPARIN ASSAY: CPT | Performed by: HOSPITALIST

## 2022-07-29 PROCEDURE — 250N000012 HC RX MED GY IP 250 OP 636 PS 637: Performed by: PHYSICIAN ASSISTANT

## 2022-07-29 PROCEDURE — 71046 X-RAY EXAM CHEST 2 VIEWS: CPT | Mod: 26 | Performed by: RADIOLOGY

## 2022-07-29 PROCEDURE — 85520 HEPARIN ASSAY: CPT | Performed by: INTERNAL MEDICINE

## 2022-07-29 PROCEDURE — 83735 ASSAY OF MAGNESIUM: CPT | Performed by: STUDENT IN AN ORGANIZED HEALTH CARE EDUCATION/TRAINING PROGRAM

## 2022-07-29 PROCEDURE — 84100 ASSAY OF PHOSPHORUS: CPT | Performed by: STUDENT IN AN ORGANIZED HEALTH CARE EDUCATION/TRAINING PROGRAM

## 2022-07-29 PROCEDURE — 250N000013 HC RX MED GY IP 250 OP 250 PS 637

## 2022-07-29 PROCEDURE — 94640 AIRWAY INHALATION TREATMENT: CPT | Mod: 76

## 2022-07-29 PROCEDURE — 999N000157 HC STATISTIC RCP TIME EA 10 MIN

## 2022-07-29 PROCEDURE — 85025 COMPLETE CBC W/AUTO DIFF WBC: CPT | Performed by: STUDENT IN AN ORGANIZED HEALTH CARE EDUCATION/TRAINING PROGRAM

## 2022-07-29 PROCEDURE — 80048 BASIC METABOLIC PNL TOTAL CA: CPT | Performed by: STUDENT IN AN ORGANIZED HEALTH CARE EDUCATION/TRAINING PROGRAM

## 2022-07-29 PROCEDURE — 250N000009 HC RX 250: Performed by: PHYSICIAN ASSISTANT

## 2022-07-29 PROCEDURE — 94640 AIRWAY INHALATION TREATMENT: CPT

## 2022-07-29 PROCEDURE — 250N000013 HC RX MED GY IP 250 OP 250 PS 637: Performed by: NURSE PRACTITIONER

## 2022-07-29 PROCEDURE — 250N000013 HC RX MED GY IP 250 OP 250 PS 637: Performed by: STUDENT IN AN ORGANIZED HEALTH CARE EDUCATION/TRAINING PROGRAM

## 2022-07-29 PROCEDURE — 120N000002 HC R&B MED SURG/OB UMMC

## 2022-07-29 PROCEDURE — 71046 X-RAY EXAM CHEST 2 VIEWS: CPT

## 2022-07-29 PROCEDURE — 97116 GAIT TRAINING THERAPY: CPT | Mod: GP

## 2022-07-29 PROCEDURE — 250N000013 HC RX MED GY IP 250 OP 250 PS 637: Performed by: HOSPITALIST

## 2022-07-29 PROCEDURE — 99233 SBSQ HOSP IP/OBS HIGH 50: CPT | Performed by: HOSPITALIST

## 2022-07-29 PROCEDURE — 99233 SBSQ HOSP IP/OBS HIGH 50: CPT | Mod: 24 | Performed by: INTERNAL MEDICINE

## 2022-07-29 PROCEDURE — U0003 INFECTIOUS AGENT DETECTION BY NUCLEIC ACID (DNA OR RNA); SEVERE ACUTE RESPIRATORY SYNDROME CORONAVIRUS 2 (SARS-COV-2) (CORONAVIRUS DISEASE [COVID-19]), AMPLIFIED PROBE TECHNIQUE, MAKING USE OF HIGH THROUGHPUT TECHNOLOGIES AS DESCRIBED BY CMS-2020-01-R: HCPCS | Performed by: HOSPITALIST

## 2022-07-29 PROCEDURE — 94660 CPAP INITIATION&MGMT: CPT

## 2022-07-29 PROCEDURE — 80197 ASSAY OF TACROLIMUS: CPT | Performed by: PHYSICIAN ASSISTANT

## 2022-07-29 PROCEDURE — 250N000011 HC RX IP 250 OP 636: Performed by: STUDENT IN AN ORGANIZED HEALTH CARE EDUCATION/TRAINING PROGRAM

## 2022-07-29 PROCEDURE — 94668 MNPJ CHEST WALL SBSQ: CPT

## 2022-07-29 PROCEDURE — 82803 BLOOD GASES ANY COMBINATION: CPT | Performed by: STUDENT IN AN ORGANIZED HEALTH CARE EDUCATION/TRAINING PROGRAM

## 2022-07-29 PROCEDURE — 97530 THERAPEUTIC ACTIVITIES: CPT | Mod: GP

## 2022-07-29 RX ORDER — OXYCODONE HYDROCHLORIDE 5 MG/1
5 TABLET ORAL ONCE
Status: COMPLETED | OUTPATIENT
Start: 2022-07-29 | End: 2022-07-29

## 2022-07-29 RX ORDER — OXYCODONE HYDROCHLORIDE 5 MG/1
5-10 TABLET ORAL EVERY 4 HOURS PRN
Status: DISCONTINUED | OUTPATIENT
Start: 2022-07-29 | End: 2022-08-17 | Stop reason: HOSPADM

## 2022-07-29 RX ADMIN — NYSTATIN 1000000 UNITS: 100000 SUSPENSION ORAL at 18:41

## 2022-07-29 RX ADMIN — MYCOPHENOLATE MOFETIL 500 MG: 200 POWDER, FOR SUSPENSION ORAL at 19:54

## 2022-07-29 RX ADMIN — PREDNISONE 12.5 MG: 2.5 TABLET ORAL at 19:54

## 2022-07-29 RX ADMIN — ACETAMINOPHEN 975 MG: 325 TABLET, FILM COATED ORAL at 19:53

## 2022-07-29 RX ADMIN — METOPROLOL TARTRATE 25 MG: 25 TABLET, FILM COATED ORAL at 19:54

## 2022-07-29 RX ADMIN — HEPARIN SODIUM 1050 UNITS/HR: 10000 INJECTION, SOLUTION INTRAVENOUS at 01:51

## 2022-07-29 RX ADMIN — INSULIN ASPART 2 UNITS: 100 INJECTION, SOLUTION INTRAVENOUS; SUBCUTANEOUS at 08:24

## 2022-07-29 RX ADMIN — Medication 5 MG: at 19:53

## 2022-07-29 RX ADMIN — INSULIN GLARGINE 15 UNITS: 100 INJECTION, SOLUTION SUBCUTANEOUS at 08:25

## 2022-07-29 RX ADMIN — NYSTATIN: 100000 CREAM TOPICAL at 08:36

## 2022-07-29 RX ADMIN — METOPROLOL TARTRATE 25 MG: 25 TABLET, FILM COATED ORAL at 08:19

## 2022-07-29 RX ADMIN — DAPSONE 50 MG: 25 TABLET ORAL at 13:07

## 2022-07-29 RX ADMIN — INSULIN GLARGINE 15 UNITS: 100 INJECTION, SOLUTION SUBCUTANEOUS at 20:30

## 2022-07-29 RX ADMIN — Medication 5 MG: at 09:03

## 2022-07-29 RX ADMIN — MYCOPHENOLATE MOFETIL 500 MG: 200 POWDER, FOR SUSPENSION ORAL at 08:20

## 2022-07-29 RX ADMIN — CALCIUM CARBONATE 600 MG (1,500 MG)-VITAMIN D3 400 UNIT TABLET 1 TABLET: at 13:05

## 2022-07-29 RX ADMIN — LEVALBUTEROL HYDROCHLORIDE 1.25 MG: 1.25 SOLUTION RESPIRATORY (INHALATION) at 09:21

## 2022-07-29 RX ADMIN — PREDNISONE 12.5 MG: 2.5 TABLET ORAL at 08:19

## 2022-07-29 RX ADMIN — OXYCODONE HYDROCHLORIDE 5 MG: 5 TABLET ORAL at 19:54

## 2022-07-29 RX ADMIN — ACETAMINOPHEN 975 MG: 325 TABLET, FILM COATED ORAL at 08:19

## 2022-07-29 RX ADMIN — ACETYLCYSTEINE 2 ML: 200 INHALANT RESPIRATORY (INHALATION) at 09:21

## 2022-07-29 RX ADMIN — NYSTATIN: 100000 CREAM TOPICAL at 20:30

## 2022-07-29 RX ADMIN — ANTACID TABLETS 500 MG: 500 TABLET, CHEWABLE ORAL at 13:05

## 2022-07-29 RX ADMIN — NYSTATIN 1000000 UNITS: 100000 SUSPENSION ORAL at 08:18

## 2022-07-29 RX ADMIN — INSULIN ASPART 2 UNITS: 100 INJECTION, SOLUTION INTRAVENOUS; SUBCUTANEOUS at 18:41

## 2022-07-29 RX ADMIN — Medication 1 PACKET: at 13:08

## 2022-07-29 RX ADMIN — ONDANSETRON 4 MG: 4 TABLET, ORALLY DISINTEGRATING ORAL at 08:19

## 2022-07-29 RX ADMIN — INSULIN ASPART 2 UNITS: 100 INJECTION, SOLUTION INTRAVENOUS; SUBCUTANEOUS at 13:08

## 2022-07-29 RX ADMIN — LEVALBUTEROL HYDROCHLORIDE 1.25 MG: 1.25 SOLUTION RESPIRATORY (INHALATION) at 13:26

## 2022-07-29 RX ADMIN — INSULIN ASPART 1 UNITS: 100 INJECTION, SOLUTION INTRAVENOUS; SUBCUTANEOUS at 03:57

## 2022-07-29 RX ADMIN — CALCIUM CARBONATE 600 MG (1,500 MG)-VITAMIN D3 400 UNIT TABLET 1 TABLET: at 18:40

## 2022-07-29 RX ADMIN — Medication 5 MG: at 13:06

## 2022-07-29 RX ADMIN — THERA TABS 1 TABLET: TAB at 13:06

## 2022-07-29 RX ADMIN — INSULIN ASPART 1 UNITS: 100 INJECTION, SOLUTION INTRAVENOUS; SUBCUTANEOUS at 00:04

## 2022-07-29 RX ADMIN — Medication 5 MG: at 04:36

## 2022-07-29 RX ADMIN — Medication 40 MG: at 19:53

## 2022-07-29 RX ADMIN — VALGANCICLOVIR HYDROCHLORIDE 450 MG: 50 POWDER, FOR SOLUTION ORAL at 08:21

## 2022-07-29 RX ADMIN — ACETYLCYSTEINE 2 ML: 200 INHALANT RESPIRATORY (INHALATION) at 20:26

## 2022-07-29 RX ADMIN — TACROLIMUS 4 MG: 5 CAPSULE ORAL at 18:40

## 2022-07-29 RX ADMIN — ACETAMINOPHEN 975 MG: 325 TABLET, FILM COATED ORAL at 13:06

## 2022-07-29 RX ADMIN — ACETYLCYSTEINE 2 ML: 200 INHALANT RESPIRATORY (INHALATION) at 13:26

## 2022-07-29 RX ADMIN — Medication 5 MG: at 00:34

## 2022-07-29 RX ADMIN — NYSTATIN 1000000 UNITS: 100000 SUSPENSION ORAL at 13:05

## 2022-07-29 RX ADMIN — LEVALBUTEROL HYDROCHLORIDE 1.25 MG: 1.25 SOLUTION RESPIRATORY (INHALATION) at 20:26

## 2022-07-29 RX ADMIN — NYSTATIN 1000000 UNITS: 100000 SUSPENSION ORAL at 21:42

## 2022-07-29 ASSESSMENT — ACTIVITIES OF DAILY LIVING (ADL)
ADLS_ACUITY_SCORE: 30

## 2022-07-29 NOTE — PROGRESS NOTES
Transplant Social Work Services Progress Note      Date of Initial Social Work Evaluation: 4/8/2021  Collaborated with: pulm team    Data: continue to follow patient following lung transplant.  She remains hospitalized with high chest tube out put. Also had feeding tube placed this week.  [facility] rehab admissions continues to follow for potential ARU.  Patients name came up on [facility] waitlist.   Daughter was called, she declined for now as patient is not nearly ready to be in out pt. Setting.  Name placed back on list.  It is likely another apartment will be available prior to patient needing one.    Intervention: transplant follow up  Assessment: continued medical needs  Education provided by SW: [facility]  Plan:    Discharge Plans in Progress: [facility] ARU     Barriers to d/c plan: high chest tube out put, medical stability    Follow up Plan: will continue to follow for support, counseling, education and resources.

## 2022-07-29 NOTE — PROGRESS NOTES
Pulmonary Medicine  Cystic Fibrosis - Lung Transplant Team  Progress Note  2022       Patient: Sofie Rodriguez  MRN: [id number]  : 1960 (age 62 year old)  Transplant: 2022 (Lung), POD#31  Admission date: 2022    Assessment & Plan:     Sofie Rodriguez is a 62 year old female with a PMH significant for end-stage COPD, HTN, HFpEF, Mycobacterium peregrinum colonization, h/o hepatitis C, HECTOR s/p LEEP procedure, osteopenia, and former methamphetamine use.  Pt. is now s/p BSLT on 22, lungs slightly undersized.   Persistent low dose pressor needs post-op through 7/10.  Extubated POD #2 but with persistent hypercapnia, mostly compensated and only slightly improved with intermittent BiPAP.  Also with left radial artery thrombus (presumed secondary to arterial line) s/p thrombectomy /, BACILIO, and C diff.  Rehabilitation and airway clearance initially limited by dysrhythmia and gastric discomfort, now with gradual improvement.  Remains on 0.5-2L oxymask (SpO2 high 80s on RA) during the day and BiPAP overnight.  S/p GJ tube placement in IR .      Today's recommendations:  - Putting out significant GT drainage, consider clamping today, may need to reopen if having bloating/discomfort  - Recommend IVIG tomorrow with premedications  - Tacrolimus level 15.6 at 11hr on , decreasing to 4mg bid  - DSA, EBV, and donor risk labs () pending  - Decreased nebs and chest physiotherapy to TID  - Wean supplemental oxygen to maintain SpO2 >92%, continue BiPAP overnight/with naps, VBG ordered every morning  - Following pending pleural cultures, right chest tube to remain given output (followed by CVTS), left chest tube to remain given output (placed by IR)  - CXR daily as below  - Repeat inspection bronch scheduled  at 11am, NPO at 0500  - Prednisone taper next due  (not yet ordered)  - Consider increasing dapsone to daily starting tomorrow if hemoglobin remains >8, did not increase today due to  Hgb of 7  - Defer transition to DOAC at this time given chest tubes     S/p bilateral sequential lung transplant (BSLT) for end stage COPD:  Persistent hypercapneic respiratory failure:   Persistent hypotension, Resolved:   Bilateral hydroPTX:  Right hemidiaphragm palsy: Explant pathology with severe emphysema with subpleural bullae formation, changes of chronic bronchitis, subpleural scars and patchy pulmonary edema; benign hilar lymph nodes.  No evidence of PGD post-op.  Extubated 6/30.  Persistent hypercapnia without dyspnea, appears to be a respiratory drive problem.  Persistent low dose pressors weaned off 7/10 and scheduled midodrine topped 7/19.  SNIFF (7/14) notable for right hemidiaphragm palsy.  Chest CT (7/18) with bilateral biapical effusions R>L and improved LLL atelectasis, also with LLL hydroPTX and bilateral PTX; bilateral chest tubes not communicating well with loculated effusion areas.  Bronch (7/19) with slight graying of mucosa noted at right anastomosis and scant secretions (slightly increased in RLL).  Left surgical chest tube removed 7/20.  Chest CT (7/23) with increased loculated fluid within the left hemithorax with specks of air density in the loculated fluid, similar appearing loculated right hydroPTX with minimal decrease in fluid component (right chest tube appears to be outside the loculated hydroPTX), increased size of pleural based lesion in MARLON, and mild subcutaneous emphysema along right chest tube with minimal along tract of removed left chest tube.  S/p left apical chest tube placed by IR 7/25, exudative.  On 0.5-2L oxymask while awake (SpO2 high 80s on RA), BiPAP overnight (ongoing hypercapnia).  - DSA one month post-transplant (7/28, pending)  - Ammonia monitoring twice weekly (screening for hyperammonemia post-lung transplant)  - Nebs: levalbuterol and Mucomyst TID  - Pulmonary toilet with chest physiotherapy TID  - Aerobika and incentive spirometry hourly while awake  -  Supplemental oxygen as needed to maintain SpO2 >92% (wean as able), BiPAP overnight/with naps given persistent hypercapnia, VBG every morning (ordered)  - Right chest tube (managed by surgical team) and left apical chest tube (placed by IR), remain with low-moderate output  - CXR daily while chest tubes remain  - Chest CT without contrast 7/28 unlikely to be able to place pigtail or thora the effusion remaining, will attempt diuresis once GT output decreases and met alkalosis improves  - Volume management per primary team  - Repeat inspection bronch next week (to evaluate anastomoses), scheduled 8/2 at 11am, NPO at 0500  - Encourage activity including up to the chair and PT/OT  - Pain management per primary team     Immunosuppression:  Induction therapy with basiliximab (and high dose IV steroid) given intraoperatively, repeating basiliximab dose on POD#4.  - Tacrolimus 4 mg BID.  Goal level 8-12.  Level (7/29) supratherapeutic at 15.6 11 hours, unclear why the rapid increase, held dose 7/29 AM and resume at 4 mg bid tonight  -  mg BID (decreased 7/27, GI symptoms/leukopenia), consider holding if WBC <3 and will transition back to PO Myfortic once tolerating PO medications consistently   - Prednisone 12.5 mg BID, next taper due 8/4 (not yet ordered)  Date AM dose (mg) PM dose (mg)   7/28/22 12.5 12.5   8/4/22 12.5 10   8/18/22 10 10   9/15/22 10 7.5   10/13/22 7.5 7.5   11/10/22 7.5 5   12/8/22 5 5   1/5/23 5 2.5      Prophylaxis:   - Dapsone for PJP ppx (7/18), defer increasing to daily pending hgb stability (once consistently >8, revisit 7/30)  - VGCV for CMV ppx, CMV negative 7/25  - Nystatin for oral candidiasis ppx, 6 month course  - See below for serologies and viral ppx:    Donor Recipient Intervention   CMV status Positive Positive Valganciclovir POD #8-90   EBV status Positive Positive EBV check monthly (7/28, pending)   HSV status N/A Positive Not indicated      ID:  H/o M. peregrinum  colonization: Donor bronch cultures (OSH) with Strep beta hemolytic (not group A).  Recipient cultures as below.  Bronch cultures (7/12) NGTD.  - AFB bronch culture (6/28, 6/29, 7/12) NGTD, AFB to be sent on all future bronchs  - Right pleural cultures (7/20) NGTD  - Left pleural cultures (7/25) NGTD     Streptococcus pneumoniae:  Stenotrophomonas maltophilia: Noted in recipient cultures at time of transplant.  S/p ceftazidime 6/28-7/10, vancomycin 7/7-7/8 and 6/28-6/30, and levofloxacin 7/10-7/12 for total 2 week ABX course.    Hypogammaglobulinemia: IgG adequate at time of transplant (1,381) and now low (336) on 7/28.  - Recommend IVIG with premedications on 7/30     H/o hepatitis C: Diagnosed in 1980s, 2 mos of treatment, quant negative since 10/2017, last positive 2/20/17 ([phone]).  Glenis positive on 6/2021 with negative HCV PCR.  H/o remote EtOH abuse.  MR elastography (4/27/21) with hepatology review and consult without any concerns post transplant.  Hepatitis C RNA negative and hepatitis C antibody positive (old) on 6/28.     PHS risk criteria donor:  Additional labs required post-transplant (between 4-8 weeks post-op): Hepatitis B, Hepatitis C, and HIV by SHERI ([id number], pending 7/28).     Other relevant problems managed by primary team:      SVT:   Aflutter with RVR: SVT first noted on 7/14, prior to HD line placement.  Continues intermittently.  Aflutter with RVR to 200 7/17 triggered by activity.  EP consulted 7/17 given persistent tachycardia/dysrhythmia.  Midodrine held 7/19.  - Metoprolol per primary team (started 7/15)  - Heparin drip (7/17), defer transition to DOAC at this time given chest tubes     Left hand ischemia: Radial artery thrombosis identified on duplex doppler.  S/p thrombectomy on 7/2.  Completed high intensity heparin course.  Continue daily aspirin.      BACILIO:   Hyperkalemia: Likely multifactorial including medications (Bactrim, tacrolimus) and hypotension.  Fludrocortisone 7/6-7/11.   Potassium now stable.  S/p non-tunneled HD line 7/14.  Initial iHD run 7/14 limited by afib with RVR vs SVT.  Last iHD run 7/16, line removed 7/22.Cr rising to 1.5 on 7/29 with diamox and elevated tac level.   - Tacrolimus monitoring as above  - Management per nephrology and primary team     Abdominal pain: Noted 7/15, cramping pain.  ACR with large stool burden in colon, no obstruction or distention.  Some nausea but no emesis.  CT abdomen (7/15) with moderate to large gastric distention, otherwise without obstruction.  NG placed for LIS with moderate to large output for several days.  Increased abdominal pain 7/17 after clamping for 4 hours, tolerated clamping today without issue.  Gastric emptying study (7/20) with severe gastric emptying delay (95% retention at 4 hours).  S/p GJ tube placement in IR 7/27.  - Simethicone prn  - TF management per RD and primary team, G tube to gravity and TF via J tube    C diff infection: Abdominal pain with diarrhea noted 7/8, AXR without dilated bowel, moderate colonic stool burden.  C diff positive 7/11.  Loose stools (on tube feeds) stable.  - PO vancomycin (7/11-7/28) per primary team, will need to revisit resuming if to begin on ABX     Hypomagnesemia: Suppressed absorption d/t CNI.   - Continue daily magnesium with replacement protocol prn, schedule oral magnesium supplementation if needed pending improvement in stools    We appreciate the excellent care provided by the [place] [id number] team.  Recommendations communicated via in person rounding and this note.  Will continue to follow along closely, please do not hesitate to call with any questions or concerns.    Claribel Franks MD  Pulmonary Transplant/CF Attending  Pager: [phone]    Subjective & Interval History:     On 1-2L via NC this morning, remains on BiPAP 15/5 with FiO2 30% overnight. GT to gravity remains but put out almost 1L yesterday. Net neg 1.1L She continues to have significant apical chest tube pain on  "the left especially when laying flat, it is considerably better when sitting up. She does feel the oxy helps but that it doesn't last as long. Diarrhea has decreased significantly, denies abdominal discomfort or cramping.     Review of Systems:     C: No fever, no chills, + increased weight  INTEGUMENTARY/SKIN: No rash or obvious new lesions  ENT/MOUTH: No sore throat, no sinus pain, no nasal congestion or drainage  RESP: See interval history  CV: No chest pain, no palpitations, + improving peripheral edema  GI: See interval history  : No dysuria  MUSCULOSKELETAL: See interval history  ENDOCRINE: Blood sugars with adequate control  NEURO: No headache, no numbness or tingling  PSYCHIATRIC: Mood stable a little anhedonic this morning    Physical Exam:     All notes, images, and labs from past 24 hours (at minimum) were reviewed.    Vital signs:  Temp: 98.1  F (36.7  C) Temp src: Oral BP: 135/77 Pulse: 98   Resp: 16 SpO2: 100 % O2 Device: Nasal cannula Oxygen Delivery: 2 LPM Height: 157.5 cm (5' 2\") Weight: 75.1 kg (165 lb 9.1 oz)  I/O:   L   R       Intake/Output Summary (Last 24 hours) at 7/29/2022 1553  Last data filed at 7/29/2022 1447  Gross per 24 hour   Intake 750 ml   Output 2280 ml   Net -1530 ml       Constitutional: Lying in bed, in no apparent distress.   HEENT: Eyes with pink conjunctivae, anicteric.  Oral mucosa moist without lesions.    PULM: Diminished air flow t/o left and to RLL.  No crackles, no rhonchi, no wheezes.  Non-labored breathing on 1L NC.  Bilateral chest tubes without air leak.  CV: Normal S1 and S2.  Tachycardic.  No murmur, gallop, or rub.  Trace BLE edema.   ABD: NABS, soft, + tender t/o, nondistended.    MSK: Moves all extremities.  + muscle wasting.   NEURO: Alert, conversant.   SKIN: Warm, dry.  No rash on limited exam.  Clamshell incision not visualized.   PSYCH: Mood stable.    Lines, Drains, and Devices:  Peripheral IV 07/20/22 Anterior;Right Lower forearm (Active) "   Site Assessment [facility] 07/28/22 0400   Line Status Infusing 07/28/22 0400   Dressing Intervention New dressing  07/20/22 1548   Phlebitis Scale 0-->no symptoms 07/28/22 0400   Infiltration Scale 0 07/28/22 0400   Number of days: 8     Data:     LABS    CMP:   Recent Labs   Lab 07/29/22  0818 07/29/22  0446 07/29/22  0355 07/29/22  0002 07/28/22  2124 07/28/22  1049 07/28/22  0925 07/27/22  0801 07/27/22  0646 07/26/22  1245 07/26/22  0735 07/25/22  0751 07/25/22  0648 07/24/22  0817 07/24/22  0754   NA  --   --   --   --   --   --  144  --  143  --  140  --  143  --  142   POTASSIUM  --   --   --   --   --   --  4.1  --  4.9  --  4.3  --  4.6  --  4.3   CHLORIDE  --   --   --   --   --   --  90*  --  90*  --  90*  --  93*  --  93*   CO2  --   --   --   --   --   --  >50*  --  45*  --  47*  --  47*  --  45*   ANIONGAP  --   --   --   --   --   --   --   --  8  --  3*  --  3*  --  4*   *  --  146* 162* 174*   < > 120*   < > 85   < > 166*   < > 172*   < > 144*   BUN  --   --   --   --   --   --  88.1*  --  84.6*  --  89.2*  --  91.1*  --  95.9*   CR  --   --   --   --   --   --  1.44*  --  1.35*  --  1.25*  --  1.20*  --  1.32*   GFRESTIMATED  --   --   --   --   --   --  41*  --  45*  --  49*  --  52*  --  46*   ESTUARDO  --   --   --   --   --   --  9.6  --  9.7  --  9.5  --  9.5  --  9.7   MAG  --  2.5*  --   --   --   --  2.5*  --  2.4*  --  2.3  --  2.4*  --  2.5*   PHOS  --  4.3  --   --   --   --  3.8  --  4.0  --  3.8  --  4.0  --  4.1   PROTTOTAL  --   --   --   --   --   --  5.2*  --   --   --   --   --  4.7*  --  4.6*   ALBUMIN  --   --   --   --   --   --  3.0*  --   --   --   --   --  2.6*  --   --    BILITOTAL  --   --   --   --   --   --  0.2  --   --   --   --   --  0.2  --   --    ALKPHOS  --   --   --   --   --   --  60  --   --   --   --   --  61  --   --    AST  --   --   --   --   --   --  17  --   --   --   --   --  19  --   --    ALT  --   --   --   --   --   --  12  --   --   --   --   --  15   --   --     < > = values in this interval not displayed.     CBC:   Recent Labs   Lab 07/29/22  0446 07/28/22  0925 07/27/22  0646 07/26/22  0735   WBC 4.9 3.9* 3.1* 3.4*   RBC 2.19* 2.70* 2.61* 2.25*   HGB 7.0* 8.7* 8.3* 7.1*   HCT 23.8* 29.6* 27.8* 23.8*   * 110* 107* 106*   MCH 32.0 32.2 31.8 31.6   MCHC 29.4* 29.4* 29.9* 29.8*   RDW 16.3* 16.9* 16.6* 17.0*    240 235 234       INR:   Recent Labs   Lab 07/27/22  0646   INR 0.87       Glucose:   Recent Labs   Lab 07/29/22  0818 07/29/22  0355 07/29/22  0002 07/28/22  2124 07/28/22  1626 07/28/22  1352   * 146* 162* 174* 210* 157*       Blood Gas:   Recent Labs   Lab 07/29/22  0453 07/28/22  1523 07/27/22  0646   PHV 7.43 7.41 7.50*   PCO2V 78* 88* 72*   PO2V 37 45 52*   HCO3V 52* 56* 56*   LINDY 24.0* 26.4* 29.1*   O2PER 30 23 100       Culture Data No results for input(s): CULT in the last 168 hours.    Virology Data:   Lab Results   Component Value Date    FLUAH1 Not Detected 06/29/2022    FLUAH3 Not Detected 06/29/2022    TS0663 Not Detected 06/29/2022    IFLUB Not Detected 06/29/2022    RSVA Not Detected 06/29/2022    RSVB Not Detected 06/29/2022    PIV1 Not Detected 06/29/2022    PIV2 Not Detected 06/29/2022    PIV3 Not Detected 06/29/2022    HMPV Not Detected 06/29/2022       Historical CMV results (last 3 of prior testing):  Lab Results   Component Value Date    CMVQNT Not Detected 07/25/2022     No results found for: CMVLOG    Urine Studies    Recent Labs   Lab Test 07/08/22  0831 07/05/22  1004   URINEPH 5.5 5.5   NITRITE Negative Negative   LEUKEST Negative Negative   WBCU 1 5       Most Recent [place] Pulmonary Function Testing (FVC/FEV1 only)  FVC-Pre   Date Value Ref Range Status   04/29/2022 1.82 L    11/11/2021 2.17 L    06/14/2021 2.00 L      FVC-%Pred-Pre   Date Value Ref Range Status   04/29/2022 58 %    11/11/2021 70 %    06/14/2021 64 %      FEV1-Pre   Date Value Ref Range Status   04/29/2022 0.51 L    11/11/2021 0.53 L     06/14/2021 0.54 L      FEV1-%Pred-Pre   Date Value Ref Range Status   04/29/2022 20 %    11/11/2021 21 %    06/14/2021 21 %        IMAGING    Recent Results (from the past 48 hour(s))   XR Chest 2 Views    Narrative    Chest 2 views    INDICATION: Interval follow-up, lung transplant, bilateral chest tubes    COMPARISON: Yesterday    FINDINGS: Clamshell sternotomy from prior bilateral lung transplant  again noted. Chest tubes again noted. Heart size normal. Left  perihilar opacity unchanged. Thickening in the right minor fissure  increased. Feeding tube beyond the inferior margin of the image.  Calcification at the aortic knob. No new ectopic air collections.  Continued costophrenic angle blunting and haziness in the left lower  lung.      Impression    IMPRESSION: Continued left pleural effusion and associated  atelectasis. Continued prominent perihilar opacity on the left.  Increased small pleural effusion or atelectasis on the right. Prior  bilateral lung transplantation.    ALVINA HARRY MD         SYSTEM ID:  [id number]   CT Chest w/o Contrast    Impression    RESIDENT PRELIMINARY INTERPRETATION  IMPRESSION:   1.  Small left loculated anteroinferior hydropneumothorax, increased  from prior.  2.  Stable loculated right hydropneumothorax with right basilar chest  tube that does not appear to be continuous with any fluid collections.  3.  Stable left upper lobe subpleural lesion measuring up to 2.0 cm.  Recommend close attention on follow-up.  4.  Enlarged bilateral hilar lymph nodes, likely reactive.

## 2022-07-29 NOTE — INTERIM SUMMARY
Evaluated patient yesterday afternoon for possible CLARI catheters due to the concern of expanding pleural effusion. Following discussion with Dr. Pantoja, pulmonology did not recommend additional chest tube. Given the patient's history of arterial thrombosis and being on therapeutic heparin, recommended continuing heparin this AM until we reevaluated patient. If upon our reevaluation we determined she would benefit then, we would stop heparin to complete block/catheters this afternoon. Will reach back out to Dr. Pantoja this afternoon with our final recommendations. Please call [phone] with further questions.     Larry, Anesthesia Fellow

## 2022-07-29 NOTE — PROGRESS NOTES
"Pain Service Progress Note  [facility]  Date: 07/29/2022       Patient Name: Sofie Rodriguez  MRN: [id number]  Age: 62 year old  Sex: female      Assessment:  Left sided chest/abdominal pain not localized around pigtail catheter    Procedure: Lung Transplant    Date of Surgery: 6/28      Plan/Recommendations:  1. Regional Anesthesia/Analgesia-  - risk of thrombosis secondary to cessation of heparin infusion/risk of hematoma from procedure likely outweighs benefit CLARI catheter could provide the patient at this time.   - If patient does require additional chest tubes please feel free to reach back out for futher input    Pain Service will sign off.    Discussed with attending anesthesiologist    Please Page the Pain Team Via [employer]: \"Adult acute inpatient pain management/[facility]\"    Larry Goss DO  07/29/2022       Subjective:  NAD, resting in bed   Nausea: No  Vomiting: No  Pruritus: No  Symptoms of LAST: No    Pain Location:  Left chest    Pain Intensity:    Pain at Rest: 4/10   Pain with Activity: 6/10  Comfort Goal: 3/10   Baseline Pain: 1/10   Satisfied with your level of pain control: Yes    Diet: NPO per Anesthesia Guidelines for Procedure/Surgery Except for: Meds  Adult Formula Drip Feeding: Continuous Novasource Renal; Jejunostomy; Goal Rate: 35; mL/hr; Medication - Feeding Tube Flush Frequency: At least 15-30 mL water before and after medication administration and with tube clogging; Amount to Send (Nutri...    Relevant Labs:  Recent Labs   Lab Test 07/29/22  0446 07/28/22  0925 07/27/22  0646 07/19/22  0721 07/18/22  1355   INR  --   --  0.87  --   --       < > 235   < > 246   PTT  --   --   --   --  23   BUN 86.8*   < > 84.6*   < >  --     < > = values in this interval not displayed.       Physical Exam:  Vitals: /77 (BP Location: Right arm)   Pulse 98   Temp 36.7  C (98.1  F) (Oral)   Resp 16   Ht 1.575 m (5' 2\")   Wt 75.1 kg (165 lb 9.1 oz)   SpO2 100%   BMI " "30.28 kg/m      Physical Exam:   Orientation:  Alert, oriented, and in no acute distress: Yes  Sedation: No    Motor Examination:  5/5 Strength in lower extremities: Yes    Sensory Level:   Decrease in sensation: No      Tender: No      Relevant Medications:  Current Pain Medications:  Medications related to Pain Management (From now, onward)    Start     Dose/Rate Route Frequency Ordered Stop    07/28/22 1219  lidocaine 1 % 0.1-1 mL         0.1-1 mL Other EVERY 1 HOUR PRN 07/28/22 1219      07/28/22 1219  lidocaine (LMX4) cream          Topical EVERY 1 HOUR PRN 07/28/22 1219      07/28/22 0921  simethicone (MYLICON) suspension 40 mg         40 mg Oral or Feeding Tube EVERY 6 HOURS PRN 07/28/22 0921      07/28/22 0921  acetaminophen (TYLENOL) tablet 650 mg         650 mg Per Feeding Tube EVERY 4 HOURS PRN 07/28/22 0921      07/15/22 1316  oxyCODONE IR (ROXICODONE) half-tab 2.5-5 mg        \"Or\" Linked Group Details    2.5-5 mg Oral EVERY 4 HOURS PRN 07/15/22 1316      07/14/22 1400  acetaminophen (TYLENOL) tablet 975 mg         975 mg Oral or Feeding Tube 3 TIMES DAILY 07/14/22 0807      07/13/22 0830  Lidocaine (LIDOCARE) 4 % Patch 2 patch         2 patch  over 12 Hours Transdermal EVERY 24 HOURS PRN 07/13/22 0811      07/13/22 0830  lidocaine patch in PLACE          Transdermal EVERY 8 HOURS PRN 07/13/22 0811      07/03/22 2000  [Held by provider]  aspirin (ASA) chewable tablet 81 mg        (Held by provider since Tue 7/26/2022 at 1123 by Claribel Franks MD.Hold Reason: Other.)    81 mg Oral DAILY 07/03/22 0903      07/02/22 0922  hydrOXYzine (ATARAX) tablet 25 mg        \"Or\" Linked Group Details    25 mg Oral or Feeding Tube EVERY 6 HOURS PRN 07/02/22 0922      07/02/22 0922  hydrOXYzine (ATARAX) tablet 50 mg        \"Or\" Linked Group Details    50 mg Oral or Feeding Tube EVERY 6 HOURS PRN 07/02/22 0922      06/29/22 0239  magnesium hydroxide (MILK OF MAGNESIA) suspension 30 mL         30 mL Oral DAILY PRN " 06/29/22 0240      06/29/22 0239  bisacodyl (DULCOLAX) suppository 10 mg         10 mg Rectal DAILY PRN 06/29/22 0240            Primary Service Contacted with Recommendations? Yes      Time/Communication:  I personally spent 10 minutes with greater than 50% in consultation, education, counseling and coordination of care.  See note above for details on conversation.

## 2022-07-29 NOTE — PROGRESS NOTES
Brief CVTS Progress Note  07/29/2022    Sofie Rodriguez is a 62 year old female with PMH significant for oxygen-dependent COPD, HF, HTN, HCV, osteopenia, and methamphetamine abuse in remission.  She is now s/p BSLT by Dr. Sunshine on 6/28/2022.  Her post-operative course has been complicated by LUE critical limb ischemia now s/p left radial thrombectomy on 7/1/2022, hypercapneic respiratory insufficiency, BACILIO, and dysphagia.     A/P:  S/p BLST on 6/28/2022, post op course c/b LUE critical limb ischemia now s/p left radial thrombectomy on 7/1/2022, hypercapneic respiratory insufficiency, BACILIO, and dysphagia.     - Chest tube output 440 mL over last 24 hours and 60 since midnight. Output still too high for removal considerations. Goal output is <200 mL for three consecutive days.  - Total output -1117 mL with 550 urinary output since midnight. Agree with Continued I/O and diuresis.  - Chest x-ray reviewed. Difficult to assess due to image quality. Left sided pleural effusion appears worse than on 7/26. Pulmonary opacities also appears worse on left more than right.   - Agree with continued diuresis and monitoring pleural effusion.   - IR-placed left apical pigtail chest tube on 7/25; management per Pulmonology  - Per surgeon, lungs did not fill chest space  - Daily wound care per nursing:  Wound cleanser to clamshell incision, pat dry, may be left open to air; keep incision C/D/I  - Remainder of plan per Pulm Transplant/Medicine teams     CVTS will continue to follow for surgical chest tube and clamshell incision management.     Discussed with Surgeon, Dr. Sunshine via written and verbal commnication.     Tracie Zhou PA-c  Pager: [phone]  11:52 AM July 29, 2022           Interval History:     No overnight events. States she is happy that she soto not need any new procedure today   States pain is well managed on current regimen. Slept well overnight.  Tolerating tube feeds, is passing flatus, BM x 1. No nausea or  "vomiting.  Breathing well without complaints  Denies chest pain, palpitations, dizziness, syncopal symptoms, fevers, chills, myalgias, or sternal popping/clicking.         Physical Exam:   Blood pressure 118/70, pulse 98, temperature 98.9  F (37.2  C), temperature source Oral, resp. rate 16, height 1.575 m (5' 2\"), weight 75.1 kg (165 lb 9.1 oz), SpO2 98 %, not currently breastfeeding.  Vitals:    07/25/22 1810 07/26/22 0500 07/27/22 0500   Weight: 72.5 kg (159 lb 14.4 oz) 74 kg (163 lb 2.3 oz) 75.1 kg (165 lb 9.1 oz)        Gen: A&Ox4, NAD  Neuro: no focal deficits   CV: Tachycardic, normal S1 S2, no murmurs, rubs or gallops. No appreciable JVD .   Pulm: CTA, no wheezing or rhonchi, normal breathing on 2-3 L/m  Incision: clean, dry, intact, no erythema, sternum stable  Tubes/drain sites: dressing clean and dry         Data:    Imaging:  reviewed recent imaging    CT scan 7/28/22    Labs:  CBC  Recent Labs   Lab 07/29/22  0446 07/28/22  0925 07/27/22  0646 07/26/22  0735   WBC 4.9 3.9* 3.1* 3.4*   RBC 2.19* 2.70* 2.61* 2.25*   HGB 7.0* 8.7* 8.3* 7.1*   HCT 23.8* 29.6* 27.8* 23.8*   * 110* 107* 106*   MCH 32.0 32.2 31.8 31.6   MCHC 29.4* 29.4* 29.9* 29.8*   RDW 16.3* 16.9* 16.6* 17.0*    240 235 234     CMP:  Last Comprehensive Metabolic Panel:  Sodium   Date Value Ref Range Status   07/29/2022 145 136 - 145 mmol/L Final   06/30/2021 138 133 - 144 mmol/L Final     Potassium   Date Value Ref Range Status   07/29/2022 4.5 3.4 - 5.3 mmol/L Final   07/06/2022 5.2 3.4 - 5.3 mmol/L Final   06/30/2021 4.4 3.4 - 5.3 mmol/L Final     Chloride   Date Value Ref Range Status   07/29/2022 91 (L) 98 - 107 mmol/L Final   04/29/2022 95 94 - 109 mmol/L Final   06/30/2021 100 94 - 109 mmol/L Final     Carbon Dioxide   Date Value Ref Range Status   06/30/2021 38 (H) 20 - 32 mmol/L Final     Carbon Dioxide (CO2)   Date Value Ref Range Status   07/29/2022 48 (HH) 22 - 29 mmol/L Final   04/29/2022 42 (H) 20 - 32 mmol/L Final "     Anion Gap   Date Value Ref Range Status   07/29/2022 6 (L) 7 - 15 mmol/L Final   04/29/2022 3 3 - 14 mmol/L Final   06/30/2021 <1 (L) 3 - 14 mmol/L Final     Glucose   Date Value Ref Range Status   07/29/2022 165 (H) 70 - 99 mg/dL Final   04/29/2022 112 (H) 70 - 99 mg/dL Final   06/30/2021 117 (H) 70 - 99 mg/dL Final     GLUCOSE BY METER POCT   Date Value Ref Range Status   07/29/2022 169 (H) 70 - 99 mg/dL Final     Urea Nitrogen   Date Value Ref Range Status   07/29/2022 86.8 (H) 8.0 - 23.0 mg/dL Final   04/29/2022 21 7 - 30 mg/dL Final   06/30/2021 30 7 - 30 mg/dL Final     Creatinine   Date Value Ref Range Status   07/29/2022 1.50 (H) 0.51 - 0.95 mg/dL Final   06/30/2021 0.85 0.52 - 1.04 mg/dL Final     GFR Estimate   Date Value Ref Range Status   07/29/2022 39 (L) >60 mL/min/1.73m2 Final     Comment:     Effective December 21, 2021 eGFRcr in adults is calculated using the 2021 CKD-EPI creatinine equation which includes age and gender (Deejay et al., NEJ, DOI: 10.1056/EMHEsr7805742)   06/30/2021 75 >60 mL/min/[1.73_m2] Final     Comment:     Non  GFR Calc  Starting 12/18/2018, serum creatinine based estimated GFR (eGFR) will be   calculated using the Chronic Kidney Disease Epidemiology Collaboration   (CKD-EPI) equation.       Calcium   Date Value Ref Range Status   07/29/2022 9.2 8.8 - 10.2 mg/dL Final   06/30/2021 9.6 8.5 - 10.1 mg/dL Final     Bilirubin Total   Date Value Ref Range Status   07/28/2022 0.2 <=1.2 mg/dL Final   06/14/2021 0.3 0.2 - 1.3 mg/dL Final     Alkaline Phosphatase   Date Value Ref Range Status   07/28/2022 60 35 - 104 U/L Final   06/14/2021 87 40 - 150 U/L Final     ALT   Date Value Ref Range Status   07/28/2022 12 10 - 35 U/L Final   06/14/2021 18 0 - 50 U/L Final     AST   Date Value Ref Range Status   07/28/2022 17 10 - 35 U/L Final   06/14/2021 20 0 - 45 U/L Final     BMP  Recent Labs   Lab 07/29/22  0818 07/29/22  0446 07/29/22  0355 07/29/22  0002 07/28/22  1049  07/28/22  0925 07/27/22  0801 07/27/22  0646 07/26/22  1245 07/26/22  0735   NA  --  145  --   --   --  144  --  143  --  140   POTASSIUM  --  4.5  --   --   --  4.1  --  4.9  --  4.3   CHLORIDE  --  91*  --   --   --  90*  --  90*  --  90*   ESTUARDO  --  9.2  --   --   --  9.6  --  9.7  --  9.5   CO2  --  48*  --   --   --  >50*  --  45*  --  47*   BUN  --  86.8*  --   --   --  88.1*  --  84.6*  --  89.2*   CR  --  1.50*  --   --   --  1.44*  --  1.35*  --  1.25*   * 165* 146* 162*   < > 120*   < > 85   < > 166*    < > = values in this interval not displayed.     INR  Recent Labs   Lab 07/27/22  0646   INR 0.87      Hepatic Panel  Recent Labs   Lab 07/28/22  0925 07/25/22  0648   AST 17 19   ALT 12 15   ALKPHOS 60 61   BILITOTAL 0.2 0.2   ALBUMIN 3.0* 2.6*     GLUCOSE:   Recent Labs   Lab 07/29/22  0818 07/29/22  0446 07/29/22  0355 07/29/22  0002 07/28/22  2124 07/28/22  1626   * 165* 146* 162* 174* 210*

## 2022-07-29 NOTE — PLAN OF CARE
Shift Summary: Pt down for chest xray and CT of chest to look for fluid collection (see results). Pt down for midline placement for labs draws in afternoon. Pt had critical CO2 of 88 and Bicarb of 55, Gold 10 aware. Pt placed back on Bipap. Prns given for pain (see MAR). Daughter updated via phone. No other concerns.      Neuro: A/O x4, fully intact     Cardiac: NSR/sinus tach, Normotensive.      Resp: 2L NC/ Bipap. Fine crackles in bases.      GI/: TF running at goal of 35 ml/hr. G to gravity, J for feeds. Loose BM x2, adeqauate urine output in the commode.      Skin: PEG tube site with old, bloody drainage. Old surgical sites are CDI.      Lines: 1 PIV to right arm, Midline to left arm.     Gtts: Heparin gtt @ 1050.

## 2022-07-29 NOTE — PLAN OF CARE
Major Shift Events: No acute events overnight.    Neuro/Musculoskeletal:  A&Ox4. Continues to have generalized weakness. Cardiac:  SR/ST. VSS.          Respiratory:  Sating above the 90s on 2L NC and Bipap overnight 15/5 at 30%. .  GI/: TF running at goal of 35 ml/hr. G to gravity, J for TF and crushed meds. Loose BM x1. Adequate UOP.  Activity:  Assist of 1 to the BS due to multiple lines and drains and dyspnea with exertion.   Pain:  Pain with movement. Managed well until transfer to [facility].  Skin:  No new deficits noted.   LDAs + Drips/IVF:  Right PIV running heparin gtt  rate @ 850 units/hr. Chest tubes x2 with minimal drainage.  Plan: Thoracentesis in IR. Continue to closely monitor patient and notify MD's of any new changes. For vital signs and complete assessments, please see documentation flowsheets.    Problem: Infection (Lung Surgery)  Goal: Absence of Infection Signs and Symptoms  Outcome: Ongoing, Progressing  Intervention: Prevent or Manage Infection  Recent Flowsheet Documentation  Taken 7/29/2022 0000 by Tiffani Morgan, RN  Infection Prevention:    environmental surveillance performed    hand hygiene promoted    personal protective equipment utilized    rest/sleep promoted  Infection Management: aseptic technique maintained  Taken 7/28/2022 2000 by Tiffani Morgan, RN  Infection Prevention:    environmental surveillance performed    hand hygiene promoted    personal protective equipment utilized    rest/sleep promoted  Infection Management: aseptic technique maintained     Problem: Pain (Lung Surgery)  Goal: Acceptable Pain Control  Outcome: Ongoing, Progressing  Intervention: Prevent or Manage Pain  Recent Flowsheet Documentation  Taken 7/29/2022 0000 by Tiffani Morgan, RN  Pain Management Interventions: medication (see MAR)  Taken 7/28/2022 2000 by Tiffani Morgan, RN  Pain Management Interventions: medication (see MAR)     Problem: Respiratory Compromise (Lung Surgery)  Goal: Effective Oxygenation and  Ventilation  Outcome: Ongoing, Progressing  Intervention: Optimize Oxygenation and Ventilation  Recent Flowsheet Documentation  Taken 7/29/2022 0000 by Tiffani Morgan RN  Administration (IS): self-administered  Chest Tube Safety: all connections secured  Head of Bed (HOB) Positioning: HOB at 30 degrees  Taken 7/28/2022 2000 by Tiffani Morgan RN  Administration (IS): self-administered  Chest Tube Safety: all connections secured  Head of Bed (HOB) Positioning: HOB at 30-45 degrees     Problem: Oral Intake Inadequate  Goal: Improved Oral Intake  Outcome: Ongoing, Progressing  Intervention: Promote and Optimize Oral Intake  Recent Flowsheet Documentation  Taken 7/29/2022 0000 by Tiffain Morgan, RN  Oral Nutrition Promotion: rest periods promoted  Taken 7/28/2022 2000 by Tiffani Morgan RN  Oral Nutrition Promotion: rest periods promoted     Problem: Pain Acute  Goal: Acceptable Pain Control and Functional Ability  Outcome: Ongoing, Progressing  Intervention: Develop Pain Management Plan  Recent Flowsheet Documentation  Taken 7/29/2022 0000 by Tiffani Morgan RN  Pain Management Interventions: medication (see MAR)  Taken 7/28/2022 2000 by Tiffani Morgan RN  Pain Management Interventions: medication (see MAR)  Intervention: Prevent or Manage Pain  Recent Flowsheet Documentation  Taken 7/29/2022 0000 by Tiffani Morgan, RN  Sensory Stimulation Regulation:    care clustered    lighting decreased    quiet environment promoted    visual stimulation minimized  Medication Review/Management: medications reviewed  Taken 7/28/2022 2000 by Tiffani Morgan, RIYA  Sensory Stimulation Regulation:    care clustered    lighting decreased    quiet environment promoted    visual stimulation minimized  Medication Review/Management: medications reviewed  Intervention: Optimize Psychosocial Wellbeing  Recent Flowsheet Documentation  Taken 7/29/2022 0000 by Tiffani Morgan, RN  Supportive Measures:    active listening utilized    decision-making  supported    self-care encouraged  Taken 7/28/2022 2000 by Tiffani Morgan, RN  Supportive Measures:    active listening utilized    decision-making supported    self-care encouraged     Problem: Hypertension Comorbidity  Goal: Blood Pressure in Desired Range  Outcome: Ongoing, Progressing  Intervention: Maintain Blood Pressure Management  Recent Flowsheet Documentation  Taken 7/29/2022 0000 by Tiffani Morgan, RN  Medication Review/Management: medications reviewed  Taken 7/28/2022 2000 by Tiffani Morgan, RN  Medication Review/Management: medications reviewed

## 2022-07-29 NOTE — PROGRESS NOTES
[facility]    Medicine Progress Note - Hospitalist Service, GOLD TEAM 10    Date of Admission:  6/28/2022    Assessment & Plan  Sofie Rodriguez is a 62 year old female admitted on 6/28/2022. She has PMH of end stage COPD s/p b/l lung transplant on 6/28/2022, HTN, HFpEF, h/o hepC, oteopenia, and former methamphetamine use, and she was transferred to [place] [id number] Medicine from MICU on 7/15/2022. She was admitted to the MICU on 7/13/20222 with prior hospital course complicated by left upper extremity acute limb ischemia s/p left radial thrombectomy on 7/1/2022. She was primarily treated for acute hypercapnic respiratory failure on intermittent BIPAP with worsening BACILIO while in the MICU. Breathing is improving, but patient has severely delayed gastric emptying found on NM study. PEGJ placement ordered, pending supply chain issue resolution.       Today's Plan:  - 7/27: s/p IR placed PEGJ    G: CLAMP, and Tacro dosing   J for feeding and meds  - Heparin gtt to resume post PEGJ, DOAC prior to discharge  - Continue to encourage OOB and up in chair  - BIPAP at night, VBG in the AM  - Follow-up pleural fluid studies for L Apical Fluid Collection, Pigtail by IR 7/25  - PO Vancomycin for CDiff -> to end 7/28.  - Pending AM labs, AM Diamox and diuresis tomorrow.   Monitor pulm function closely today     End stage COPD s/p BOLT 6/28/2022  Acute hypercapnic hypoxic respiratory failure (improving), likely 2/2 decreased central respiratory drive vs respiratory muscle weakness and some pulmonary edema / fluid Transplanted 6/28. formal SNIFF test was performed on 7/14 showed R hemidiaphragm palsy. Of note transplanted lungs were also considered small for body size and could contribute to decreased respiratory compensation for hypercarbia. VBG relatively stable, most recent pCO2 74 (7/22).   - Continue BIPAP at night   - Simple mask w intermittent BIPAP for night and daytime naps; goal to keep  O2sat above 92%  - f/u myasthenia gravis panel (7/14: negative)  - oxycodone 2.5-5mg q4h PRN   - encourage activity and getting up in bed; PT/OT participation  - encourage chest physiotherapy QID    Immunosuppression:  - Prednisone 12.5 BID  - Tacrolimus 5.5mg BID (trough goal 8-12)   -  BID  Prophylasxis:  - CMV - Valgancyclover  - Thrush - PO nysatin  - PJP - TMP-SMX (currently held given BACILIO); dapsone started 7/18 at 50mg qMWF (will try to increase to daily on 7/25 per pulm)     Pleural Effusion, Right and Left  - 1 R chest tube (basilar) in place currently (pericardial drain was removed 7/15, L basilar was removed 7/20). CT chest 7/14 showing unchanged bilateral effusions and unchanged biapical pneumothoraces with resolved left basilar pneumothorax; bibasilar chest tubes in place with pericardial drain (which was removed 7/15).   - daily CXR  - RIGHT Chest Tube - still with output  - LEFT Apical Fluid collection - IR targeting via CT guided pigtail placement     Hypotension, resolved  H/o HTN  H/o HFpEF  Likely vasoplegia post operatively. Last echo 7/7 normal EF with good LV function. S/p hydrocortisone 7/6-7/9 and fludrocortisone 7/6-7/11 without significant improvement.  - off midorine 7/19  - Holding PTA lasix 20mg daily     C.diff - vanco started 7/12, may need prolonged course given IS  Abdominal pain - due to volume / c diff / gastro paresis   Severe Gastroparesis - gastric emptying study 7/20  - PO vanc 125mg QID 7/12 to present  - gastric emptying study 7/20 showed delayed gastric emptying with retention of 95% of stomach contents after 4 hours; please keep patient NPO except tube feeds and meds  - Simethicone scheduled  - 7/27: PEGJ     NJ tube  Feeding regimen:   - Adult Formula Drip Feeding: Continuous Novasource Renal; Nasojejunal; Goal Rate: 35; initiate at goal rate; mL/hr; Medication - Feeding Tube Flush Frequency: At least 15-30 mL water before and after medication administration and with  tube clogging     BACILIO  Hypermagnesemia  Hyperphosphatemia  Baseline renal function was normal prior to surgery. New onset renal failure after transplant, likely multifactorial due to pre-renal hypotension, less likely nephrotoxic agents. Overall kidney function improving. Today, Cr fluctuating but BUN increasing, with unclear urine output (7/22).   - HD cath removed 7/22, trend metabolites     Tachyarrthymia  Paroxysmal afib  Paroxysmal aflutter/AT  SVT vs afib.Had an occurrence during HD on 7/14. Had afib with RVR to 200s on 7/17. EP consult placed.asmyptomatic and stable during episodes. Aflutter episode (7/17) with transfer. Vagal maneuver responsive at time. No recent tachyarrhythmia episodes (7/22).   - Per EP: patient has had episodes of possible flutter (vs AT with 2:1 conduction) and afib with RVR  - heparin drip and transition to DOAC after PEGJ placement and chest tubes resolved (CHADS-VASc score of 2)  - On telemetry  - On metoprolol tartrate 25mg BID  - Discharge on ziopatch for 14 days with EP follow up in 1-2 months after     Stress-induced hyperglycemia  -200s over past day.   - on 15U glargine BID; continue today and re-evaluate as needed     Acute Blood Loss Anemia, stable  Initially from acute blood loss. Hb dropped to 6.8 on 7/14, requiring 1U pRBC. Post-transfusion Hb 9.4 > 8.3 > 8.6 > 8.7 (7/18).   - continue to monitor  - transfuse for hgb<7        Diet: NPO per Anesthesia Guidelines for Procedure/Surgery Except for: Meds  Adult Formula Drip Feeding: Continuous Novasource Renal; Jejunostomy; Goal Rate: 35; mL/hr; Medication - Feeding Tube Flush Frequency: At least 15-30 mL water before and after medication administration and with tube clogging; Amount to Send (Nutri...  NPO per Anesthesia Guidelines for Procedure/Surgery Except for: No Exceptions    DVT Prophylaxis: heparin  Dong Catheter: Not present  Central Lines: PRESENT       Cardiac Monitoring: None  Code Status: Full Code       Disposition Plan        The patient's care was discussed with the Patient, Patient's Family and Pulm Tx Consultant.    Catalino Pantoja MD  Hospitalist Service, GOLD TEAM [address] [place] [address]  Securely message with the Vocera Web Console (learn more here)  Text page via [facility] Paging/Directory   Please see signed in provider for up to date coverage information      Clinically Significant Risk Factors Present on Admission                      ______________________________________________________________________    Interval History   Seen today for follow up: Post lung transplant, pleural effusion, chest tubes    No acute overnight events per patient or patient's family.    This AM, feels better when upright and out of bed.  Daughter at bedside this AM. Good conversation and update provided.    Pt still with multifactorial chest wall and Pigtail site pain, non-focal though  No nausea  Breathing comfortable, better when upright  No wheezing noted      As of today/at time of my visit:  Patient denies any headache, sore throat  Patient denies any sleeping disturbance  Patient denies any nausea or vomiting  Patient reports no abdominal pain  Patient denies any chest pain  Patient reports no arm or leg edema      Data reviewed today: I reviewed all medications, new labs and imaging results over the last 24 hours. I personally reviewed no images or EKG's today.    Physical Exam   Vital Signs: Temp: 98.9  F (37.2  C) Temp src: Oral BP: 118/70 Pulse: 98   Resp: 16 SpO2: 98 % O2 Device: Nasal cannula Oxygen Delivery: 2 LPM  Weight: 165 lbs 9.05 oz  General: non-distressed adult  HEENT: Normocephalic, atraumatic, pupils equal round reactive, membranes moist. NGT, NJT  CV: normal capillary refill, heart regular rate and rhythm, tele  Respiratory: Non-labored breathing, bronchovesicular lung sounds, R Chest Tube noted, draining serosangiunous. L apical chest tube in place  Abdominal:  soft, mildly tender in the epigastrium, no rebound, no guarding, no rigidity, +bowel sounds  Genitourinary: no suprapubic tenderness  Musculoskeletal: normal bulk and tone  Skin: no rash, normal turgor  Neurologic: no facial droop, moving upper and lower extremities spontaneously, sensation in tact to light touch on extremities  Psychiatric: normal mood and affect    Data   Recent Labs   Lab 07/29/22  1159 07/29/22  0818 07/29/22  0446 07/28/22  1049 07/28/22  0925 07/27/22  0801 07/27/22  0646 07/26/22  1245 07/26/22  0735 07/25/22  0751 07/25/22  0648   WBC  --   --  4.9  --  3.9*  --  3.1*  --  3.4*  --  4.0   HGB  --   --  7.0*  --  8.7*  --  8.3*  --  7.1*  --  7.3*   MCV  --   --  109*  --  110*  --  107*  --  106*  --  107*   PLT  --   --  265  --  240  --  235  --  234  --  267   INR  --   --   --   --   --   --  0.87  --   --   --   --    NA  --   --  145  --  144  --  143  --  140  --  143   POTASSIUM  --   --  4.5  --  4.1  --  4.9  --  4.3  --  4.6   CHLORIDE  --   --  91*  --  90*  --  90*  --  90*  --  93*   CO2  --   --  48*  --  >50*  --  45*  --  47*  --  47*   BUN  --   --  86.8*  --  88.1*  --  84.6*  --  89.2*  --  91.1*   CR  --   --  1.50*  --  1.44*  --  1.35*  --  1.25*  --  1.20*   ANIONGAP  --   --  6*  --   --   --  8  --  3*  --  3*   ESTUARDO  --   --  9.2  --  9.6  --  9.7  --  9.5  --  9.5   * 169* 165*   < > 120*   < > 85   < > 166*   < > 172*   ALBUMIN  --   --   --   --  3.0*  --   --   --   --   --  2.6*   PROTTOTAL  --   --   --   --  5.2*  --   --   --   --   --  4.7*   BILITOTAL  --   --   --   --  0.2  --   --   --   --   --  0.2   ALKPHOS  --   --   --   --  60  --   --   --   --   --  61   ALT  --   --   --   --  12  --   --   --   --   --  15   AST  --   --   --   --  17  --   --   --   --   --  19    < > = values in this interval not displayed.

## 2022-07-30 ENCOUNTER — APPOINTMENT (OUTPATIENT)
Dept: GENERAL RADIOLOGY | Facility: CLINIC | Age: 62
DRG: 007 | End: 2022-07-30
Attending: PHYSICIAN ASSISTANT
Payer: MEDICARE

## 2022-07-30 LAB
ABO/RH(D): ABNORMAL
ANION GAP SERPL CALCULATED.3IONS-SCNC: 2 MMOL/L (ref 7–15)
ANTIBODY SCREEN: POSITIVE
ANTIBODY UNIDENTIFIED: NORMAL
BACTERIA PLR CULT: NO GROWTH
BASE EXCESS BLDV CALC-SCNC: 23.3 MMOL/L (ref -7.7–1.9)
BASOPHILS # BLD MANUAL: 0 10E3/UL (ref 0–0.2)
BASOPHILS NFR BLD MANUAL: 0 %
BLD PROD TYP BPU: NORMAL
BLOOD COMPONENT TYPE: NORMAL
BUN SERPL-MCNC: 84.2 MG/DL (ref 8–23)
CALCIUM SERPL-MCNC: 9.1 MG/DL (ref 8.8–10.2)
CHLORIDE SERPL-SCNC: 91 MMOL/L (ref 98–107)
CODING SYSTEM: NORMAL
CREAT SERPL-MCNC: 1.61 MG/DL (ref 0.51–0.95)
CROSSMATCH: NORMAL
DEPRECATED HCO3 PLAS-SCNC: 49 MMOL/L (ref 22–29)
DONOR IDENTIFICATION: NORMAL
DSA COMMENTS: NORMAL
DSA PRESENT: NO
DSA TEST METHOD: NORMAL
EOSINOPHIL # BLD MANUAL: 0.2 10E3/UL (ref 0–0.7)
EOSINOPHIL NFR BLD MANUAL: 3 %
ERYTHROCYTE [DISTWIDTH] IN BLOOD BY AUTOMATED COUNT: 16.3 % (ref 10–15)
GFR SERPL CREATININE-BSD FRML MDRD: 36 ML/MIN/1.73M2
GLUCOSE BLDC GLUCOMTR-MCNC: 114 MG/DL (ref 70–99)
GLUCOSE BLDC GLUCOMTR-MCNC: 151 MG/DL (ref 70–99)
GLUCOSE BLDC GLUCOMTR-MCNC: 158 MG/DL (ref 70–99)
GLUCOSE BLDC GLUCOMTR-MCNC: 159 MG/DL (ref 70–99)
GLUCOSE BLDC GLUCOMTR-MCNC: 175 MG/DL (ref 70–99)
GLUCOSE BLDC GLUCOMTR-MCNC: 178 MG/DL (ref 70–99)
GLUCOSE SERPL-MCNC: 185 MG/DL (ref 70–99)
HCO3 BLDV-SCNC: 50 MMOL/L (ref 21–28)
HCT VFR BLD AUTO: 22.4 % (ref 35–47)
HGB BLD-MCNC: 6.7 G/DL (ref 11.7–15.7)
ISSUE DATE AND TIME: NORMAL
LYMPHOCYTES # BLD MANUAL: 0 10E3/UL (ref 0.8–5.3)
LYMPHOCYTES NFR BLD MANUAL: 0 %
MAGNESIUM SERPL-MCNC: 2.6 MG/DL (ref 1.7–2.3)
MCH RBC QN AUTO: 32.2 PG (ref 26.5–33)
MCHC RBC AUTO-ENTMCNC: 29.9 G/DL (ref 31.5–36.5)
MCV RBC AUTO: 108 FL (ref 78–100)
MONOCYTES # BLD MANUAL: 0.2 10E3/UL (ref 0–1.3)
MONOCYTES NFR BLD MANUAL: 4 %
MYELOCYTES # BLD MANUAL: 0.1 10E3/UL
MYELOCYTES NFR BLD MANUAL: 2 %
NEUTROPHILS # BLD MANUAL: 5.4 10E3/UL (ref 1.6–8.3)
NEUTROPHILS NFR BLD MANUAL: 91 %
O2/TOTAL GAS SETTING VFR VENT: 30 %
ORGAN: NORMAL
PCO2 BLDV: 76 MM HG (ref 40–50)
PH BLDV: 7.43 [PH] (ref 7.32–7.43)
PHOSPHATE SERPL-MCNC: 4.8 MG/DL (ref 2.5–4.5)
PLAT MORPH BLD: ABNORMAL
PLATELET # BLD AUTO: 244 10E3/UL (ref 150–450)
PO2 BLDV: 43 MM HG (ref 25–47)
POTASSIUM SERPL-SCNC: 4.5 MMOL/L (ref 3.4–5.3)
RBC # BLD AUTO: 2.08 10E6/UL (ref 3.8–5.2)
RBC MORPH BLD: ABNORMAL
SA 1 CELL: NORMAL
SA 1 TEST METHOD: NORMAL
SA 2 CELL: NORMAL
SA 2 TEST METHOD: NORMAL
SA1 HI RISK ABY: NORMAL
SA1 MOD RISK ABY: NORMAL
SA2 HI RISK ABY: NORMAL
SA2 MOD RISK ABY: NORMAL
SODIUM SERPL-SCNC: 142 MMOL/L (ref 136–145)
SPECIMEN EXPIRATION DATE: ABNORMAL
SPECIMEN EXPIRATION DATE: NORMAL
UFH PPP CHRO-ACNC: 0.3 IU/ML
UFH PPP CHRO-ACNC: 0.32 IU/ML
UFH PPP CHRO-ACNC: 0.6 IU/ML
UNACCEPTABLE ANTIGENS: NORMAL
UNIT ABO/RH: NORMAL
UNIT NUMBER: NORMAL
UNIT STATUS: NORMAL
UNIT TYPE ISBT: 5100
UNOS CPRA: 0
WBC # BLD AUTO: 5.9 10E3/UL (ref 4–11)
ZZZSA 1  COMMENTS: NORMAL
ZZZSA 2 COMMENTS: NORMAL

## 2022-07-30 PROCEDURE — 82803 BLOOD GASES ANY COMBINATION: CPT | Performed by: STUDENT IN AN ORGANIZED HEALTH CARE EDUCATION/TRAINING PROGRAM

## 2022-07-30 PROCEDURE — 94640 AIRWAY INHALATION TREATMENT: CPT | Mod: 76

## 2022-07-30 PROCEDURE — 120N000002 HC R&B MED SURG/OB UMMC

## 2022-07-30 PROCEDURE — 99233 SBSQ HOSP IP/OBS HIGH 50: CPT | Mod: 24 | Performed by: INTERNAL MEDICINE

## 2022-07-30 PROCEDURE — 250N000012 HC RX MED GY IP 250 OP 636 PS 637: Performed by: INTERNAL MEDICINE

## 2022-07-30 PROCEDURE — 86922 COMPATIBILITY TEST ANTIGLOB: CPT | Performed by: STUDENT IN AN ORGANIZED HEALTH CARE EDUCATION/TRAINING PROGRAM

## 2022-07-30 PROCEDURE — 83735 ASSAY OF MAGNESIUM: CPT | Performed by: STUDENT IN AN ORGANIZED HEALTH CARE EDUCATION/TRAINING PROGRAM

## 2022-07-30 PROCEDURE — 94640 AIRWAY INHALATION TREATMENT: CPT

## 2022-07-30 PROCEDURE — 250N000011 HC RX IP 250 OP 636: Performed by: HOSPITALIST

## 2022-07-30 PROCEDURE — 999N000157 HC STATISTIC RCP TIME EA 10 MIN

## 2022-07-30 PROCEDURE — 250N000012 HC RX MED GY IP 250 OP 636 PS 637: Performed by: PHYSICIAN ASSISTANT

## 2022-07-30 PROCEDURE — 250N000011 HC RX IP 250 OP 636: Performed by: STUDENT IN AN ORGANIZED HEALTH CARE EDUCATION/TRAINING PROGRAM

## 2022-07-30 PROCEDURE — 250N000013 HC RX MED GY IP 250 OP 250 PS 637: Performed by: NURSE PRACTITIONER

## 2022-07-30 PROCEDURE — 250N000009 HC RX 250: Performed by: PHYSICIAN ASSISTANT

## 2022-07-30 PROCEDURE — 250N000013 HC RX MED GY IP 250 OP 250 PS 637: Performed by: THORACIC SURGERY (CARDIOTHORACIC VASCULAR SURGERY)

## 2022-07-30 PROCEDURE — 85520 HEPARIN ASSAY: CPT | Performed by: HOSPITALIST

## 2022-07-30 PROCEDURE — 84100 ASSAY OF PHOSPHORUS: CPT | Performed by: STUDENT IN AN ORGANIZED HEALTH CARE EDUCATION/TRAINING PROGRAM

## 2022-07-30 PROCEDURE — 250N000013 HC RX MED GY IP 250 OP 250 PS 637: Performed by: SURGERY

## 2022-07-30 PROCEDURE — P9016 RBC LEUKOCYTES REDUCED: HCPCS | Performed by: STUDENT IN AN ORGANIZED HEALTH CARE EDUCATION/TRAINING PROGRAM

## 2022-07-30 PROCEDURE — 94668 MNPJ CHEST WALL SBSQ: CPT

## 2022-07-30 PROCEDURE — 85027 COMPLETE CBC AUTOMATED: CPT | Performed by: STUDENT IN AN ORGANIZED HEALTH CARE EDUCATION/TRAINING PROGRAM

## 2022-07-30 PROCEDURE — 250N000013 HC RX MED GY IP 250 OP 250 PS 637: Performed by: HOSPITALIST

## 2022-07-30 PROCEDURE — 86870 RBC ANTIBODY IDENTIFICATION: CPT | Performed by: HOSPITALIST

## 2022-07-30 PROCEDURE — 82310 ASSAY OF CALCIUM: CPT | Performed by: STUDENT IN AN ORGANIZED HEALTH CARE EDUCATION/TRAINING PROGRAM

## 2022-07-30 PROCEDURE — 86901 BLOOD TYPING SEROLOGIC RH(D): CPT | Performed by: HOSPITALIST

## 2022-07-30 PROCEDURE — 85007 BL SMEAR W/DIFF WBC COUNT: CPT | Performed by: STUDENT IN AN ORGANIZED HEALTH CARE EDUCATION/TRAINING PROGRAM

## 2022-07-30 PROCEDURE — 250N000013 HC RX MED GY IP 250 OP 250 PS 637: Performed by: STUDENT IN AN ORGANIZED HEALTH CARE EDUCATION/TRAINING PROGRAM

## 2022-07-30 PROCEDURE — 250N000011 HC RX IP 250 OP 636: Performed by: SURGERY

## 2022-07-30 PROCEDURE — 94660 CPAP INITIATION&MGMT: CPT

## 2022-07-30 PROCEDURE — 71046 X-RAY EXAM CHEST 2 VIEWS: CPT | Mod: 26 | Performed by: RADIOLOGY

## 2022-07-30 PROCEDURE — 71046 X-RAY EXAM CHEST 2 VIEWS: CPT

## 2022-07-30 PROCEDURE — 80197 ASSAY OF TACROLIMUS: CPT | Performed by: INTERNAL MEDICINE

## 2022-07-30 PROCEDURE — 99233 SBSQ HOSP IP/OBS HIGH 50: CPT | Performed by: HOSPITALIST

## 2022-07-30 RX ORDER — ACETAZOLAMIDE 500 MG/5ML
250 INJECTION, POWDER, LYOPHILIZED, FOR SOLUTION INTRAVENOUS ONCE
Status: COMPLETED | OUTPATIENT
Start: 2022-07-30 | End: 2022-07-30

## 2022-07-30 RX ORDER — DIPHENHYDRAMINE HCL 25 MG
50 CAPSULE ORAL ONCE
Status: COMPLETED | OUTPATIENT
Start: 2022-07-30 | End: 2022-07-30

## 2022-07-30 RX ORDER — DIPHENHYDRAMINE HYDROCHLORIDE 50 MG/ML
50 INJECTION INTRAMUSCULAR; INTRAVENOUS ONCE
Status: COMPLETED | OUTPATIENT
Start: 2022-07-30 | End: 2022-07-30

## 2022-07-30 RX ORDER — ACETAMINOPHEN 325 MG/1
650 TABLET ORAL
Status: DISCONTINUED | OUTPATIENT
Start: 2022-07-30 | End: 2022-07-31

## 2022-07-30 RX ORDER — ACETAMINOPHEN 325 MG/1
650 TABLET ORAL ONCE
Status: DISCONTINUED | OUTPATIENT
Start: 2022-07-30 | End: 2022-07-30

## 2022-07-30 RX ORDER — DIPHENHYDRAMINE HYDROCHLORIDE 50 MG/ML
50 INJECTION INTRAMUSCULAR; INTRAVENOUS
Status: DISCONTINUED | OUTPATIENT
Start: 2022-07-30 | End: 2022-07-31

## 2022-07-30 RX ORDER — DIPHENHYDRAMINE HCL 25 MG
50 CAPSULE ORAL
Status: DISCONTINUED | OUTPATIENT
Start: 2022-07-30 | End: 2022-07-31

## 2022-07-30 RX ORDER — METHYLPREDNISOLONE SODIUM SUCCINATE 125 MG/2ML
125 INJECTION, POWDER, LYOPHILIZED, FOR SOLUTION INTRAMUSCULAR; INTRAVENOUS
Status: DISCONTINUED | OUTPATIENT
Start: 2022-07-30 | End: 2022-07-31

## 2022-07-30 RX ADMIN — CALCIUM CARBONATE 600 MG (1,500 MG)-VITAMIN D3 400 UNIT TABLET 1 TABLET: at 09:35

## 2022-07-30 RX ADMIN — INSULIN ASPART 2 UNITS: 100 INJECTION, SOLUTION INTRAVENOUS; SUBCUTANEOUS at 00:39

## 2022-07-30 RX ADMIN — METOPROLOL TARTRATE 25 MG: 25 TABLET, FILM COATED ORAL at 08:15

## 2022-07-30 RX ADMIN — PREDNISONE 12.5 MG: 2.5 TABLET ORAL at 21:04

## 2022-07-30 RX ADMIN — INSULIN GLARGINE 15 UNITS: 100 INJECTION, SOLUTION SUBCUTANEOUS at 08:24

## 2022-07-30 RX ADMIN — MYCOPHENOLATE MOFETIL 500 MG: 200 POWDER, FOR SUSPENSION ORAL at 21:04

## 2022-07-30 RX ADMIN — ACETYLCYSTEINE 2 ML: 200 INHALANT RESPIRATORY (INHALATION) at 13:58

## 2022-07-30 RX ADMIN — ACETYLCYSTEINE 2 ML: 200 INHALANT RESPIRATORY (INHALATION) at 08:42

## 2022-07-30 RX ADMIN — Medication 1 PACKET: at 11:55

## 2022-07-30 RX ADMIN — ACETAMINOPHEN 975 MG: 325 TABLET, FILM COATED ORAL at 14:23

## 2022-07-30 RX ADMIN — Medication 40 MG: at 21:04

## 2022-07-30 RX ADMIN — OXYCODONE HYDROCHLORIDE 10 MG: 5 TABLET ORAL at 15:43

## 2022-07-30 RX ADMIN — NYSTATIN: 100000 CREAM TOPICAL at 08:23

## 2022-07-30 RX ADMIN — LEVALBUTEROL HYDROCHLORIDE 1.25 MG: 1.25 SOLUTION RESPIRATORY (INHALATION) at 19:39

## 2022-07-30 RX ADMIN — INSULIN GLARGINE 15 UNITS: 100 INJECTION, SOLUTION SUBCUTANEOUS at 21:24

## 2022-07-30 RX ADMIN — TACROLIMUS 4 MG: 5 CAPSULE ORAL at 08:22

## 2022-07-30 RX ADMIN — THIAMINE HCL TAB 100 MG 100 MG: 100 TAB at 09:35

## 2022-07-30 RX ADMIN — MYCOPHENOLATE MOFETIL 500 MG: 200 POWDER, FOR SUSPENSION ORAL at 08:17

## 2022-07-30 RX ADMIN — ACETAMINOPHEN 975 MG: 325 TABLET, FILM COATED ORAL at 21:04

## 2022-07-30 RX ADMIN — OXYCODONE HYDROCHLORIDE 10 MG: 5 TABLET ORAL at 00:38

## 2022-07-30 RX ADMIN — LEVALBUTEROL HYDROCHLORIDE 1.25 MG: 1.25 SOLUTION RESPIRATORY (INHALATION) at 13:58

## 2022-07-30 RX ADMIN — ONDANSETRON 4 MG: 4 TABLET, ORALLY DISINTEGRATING ORAL at 08:17

## 2022-07-30 RX ADMIN — Medication 40 MG: at 09:34

## 2022-07-30 RX ADMIN — OXYCODONE HYDROCHLORIDE 10 MG: 5 TABLET ORAL at 08:03

## 2022-07-30 RX ADMIN — INSULIN ASPART 1 UNITS: 100 INJECTION, SOLUTION INTRAVENOUS; SUBCUTANEOUS at 15:50

## 2022-07-30 RX ADMIN — CALCIUM CARBONATE 600 MG (1,500 MG)-VITAMIN D3 400 UNIT TABLET 1 TABLET: at 18:29

## 2022-07-30 RX ADMIN — INSULIN ASPART 1 UNITS: 100 INJECTION, SOLUTION INTRAVENOUS; SUBCUTANEOUS at 08:38

## 2022-07-30 RX ADMIN — LEVALBUTEROL HYDROCHLORIDE 1.25 MG: 1.25 SOLUTION RESPIRATORY (INHALATION) at 08:42

## 2022-07-30 RX ADMIN — PREDNISONE 12.5 MG: 2.5 TABLET ORAL at 08:15

## 2022-07-30 RX ADMIN — INSULIN ASPART 1 UNITS: 100 INJECTION, SOLUTION INTRAVENOUS; SUBCUTANEOUS at 21:24

## 2022-07-30 RX ADMIN — NYSTATIN 1000000 UNITS: 100000 SUSPENSION ORAL at 08:15

## 2022-07-30 RX ADMIN — ACETAZOLAMIDE 250 MG: 500 INJECTION, POWDER, LYOPHILIZED, FOR SOLUTION INTRAVENOUS at 14:25

## 2022-07-30 RX ADMIN — THERA TABS 1 TABLET: TAB at 11:55

## 2022-07-30 RX ADMIN — METOPROLOL TARTRATE 25 MG: 25 TABLET, FILM COATED ORAL at 21:04

## 2022-07-30 RX ADMIN — HUMAN IMMUNOGLOBULIN G 25 G: 20 LIQUID INTRAVENOUS at 15:37

## 2022-07-30 RX ADMIN — DIPHENHYDRAMINE HYDROCHLORIDE 50 MG: 25 CAPSULE ORAL at 14:23

## 2022-07-30 RX ADMIN — ACETYLCYSTEINE 2 ML: 200 INHALANT RESPIRATORY (INHALATION) at 19:39

## 2022-07-30 RX ADMIN — ONDANSETRON 4 MG: 2 INJECTION INTRAMUSCULAR; INTRAVENOUS at 18:39

## 2022-07-30 RX ADMIN — OXYCODONE HYDROCHLORIDE 10 MG: 5 TABLET ORAL at 21:04

## 2022-07-30 RX ADMIN — ACETAMINOPHEN 975 MG: 325 TABLET, FILM COATED ORAL at 08:15

## 2022-07-30 RX ADMIN — INSULIN ASPART 2 UNITS: 100 INJECTION, SOLUTION INTRAVENOUS; SUBCUTANEOUS at 03:51

## 2022-07-30 ASSESSMENT — ACTIVITIES OF DAILY LIVING (ADL)
ADLS_ACUITY_SCORE: 30

## 2022-07-30 NOTE — PLAN OF CARE
Major Shift Events: Pain better managed with 10mg Oxycodone per pt. Improved mobility to BSC with assist x1. Tolerated bipap at 30% for majority of night. Pt reported adequate sleep. Hgb 6.7. 1 U PRBC given.     Neuro/Musculoskeletal:  A&Ox4. Generalized weakness.   Cardiac:  SR/ST. VSS.          Respiratory:  Sating above 90% on 2L NC and Bipap overnight 15/5 at 30%. .  GI/: TF running at goal of 35 ml/hr. G clamped, J for TF and crushed meds. No BM's overnight. UOP adequate.  Activity:  Assist of 1 to the BSC due to multiple lines and drains.  Pain: Pain with mobility, but able to better tolerate with increased Oxycodone.  Skin:  No new deficits noted.   LDAs + Drips/IVF:  Right PIV running heparin gtt  rate @ 800 units/hr. Chest tubes x2 with minimal drainage.    Plan: Bronch on 8/2 at 1100, will need to be NPO. Diurese today, monitor output.  Continue to closely monitor patient and notify MD's of any new changes. For vital signs and complete assessments, please see documentation flowsheets.    Problem: Bleeding (Lung Surgery)  Goal: Absence of Bleeding  Outcome: Ongoing, Progressing  Intervention: Monitor and Manage Bleeding  Recent Flowsheet Documentation  Taken 7/30/2022 0000 by Tiffani Morgan, RN  Bleeding Management: dressing monitored  Taken 7/29/2022 2000 by Tiffani Morgan, RN  Bleeding Management: dressing monitored     Problem: Fluid and Electrolyte Imbalance (Lung Surgery)  Goal: Fluid and Electrolyte Balance  Outcome: Ongoing, Progressing  Intervention: Monitor and Manage Fluid and Electrolyte Balance  Recent Flowsheet Documentation  Taken 7/30/2022 0000 by Tiffani Morgan, RN  Fluid/Electrolyte Management: fluids provided  Taken 7/29/2022 2000 by Tiffani Morgan, RN  Fluid/Electrolyte Management: fluids provided     Problem: Infection (Lung Surgery)  Goal: Absence of Infection Signs and Symptoms  Outcome: Ongoing, Progressing  Intervention: Prevent or Manage Infection  Recent Flowsheet  Documentation  Taken 7/30/2022 0000 by Tiffani Morgan, RN  Infection Prevention:    environmental surveillance performed    hand hygiene promoted    personal protective equipment utilized    rest/sleep promoted  Infection Management: aseptic technique maintained  Taken 7/29/2022 2000 by Tiffani Morgan RN  Infection Prevention:    environmental surveillance performed    hand hygiene promoted    personal protective equipment utilized    rest/sleep promoted  Infection Management: aseptic technique maintained     Problem: Pain (Lung Surgery)  Goal: Acceptable Pain Control  Outcome: Ongoing, Progressing  Intervention: Prevent or Manage Pain  Recent Flowsheet Documentation  Taken 7/30/2022 0030 by Tiffani Morgan RN  Pain Management Interventions: medication (see MAR)  Taken 7/29/2022 2000 by Tiffani Morgan RN  Pain Management Interventions: medication (see MAR)     Problem: Oral Intake Inadequate  Goal: Improved Oral Intake  Outcome: Ongoing, Progressing  Intervention: Promote and Optimize Oral Intake  Recent Flowsheet Documentation  Taken 7/30/2022 0000 by Tiffani Morgan RN  Oral Nutrition Promotion: rest periods promoted  Taken 7/29/2022 2000 by Tiffani Morgan RN  Oral Nutrition Promotion: rest periods promoted     Problem: Pain Acute  Goal: Acceptable Pain Control and Functional Ability  Outcome: Ongoing, Progressing  Intervention: Develop Pain Management Plan  Recent Flowsheet Documentation  Taken 7/30/2022 0030 by Tiffani Morgan, RIYA  Pain Management Interventions: medication (see MAR)  Taken 7/29/2022 2000 by Tiffani Morgan RN  Pain Management Interventions: medication (see MAR)  Intervention: Prevent or Manage Pain  Recent Flowsheet Documentation  Taken 7/30/2022 0000 by Tiffani Morgan, RN  Sensory Stimulation Regulation:    care clustered    lighting decreased    quiet environment promoted    visual stimulation minimized  Medication Review/Management: medications reviewed  Taken 7/29/2022 2000 by Tiffani Morgan,  RN  Sensory Stimulation Regulation:    care clustered    lighting decreased    quiet environment promoted    visual stimulation minimized  Medication Review/Management: medications reviewed  Intervention: Optimize Psychosocial Wellbeing  Recent Flowsheet Documentation  Taken 7/30/2022 0000 by Tiffani Morgan, RN  Supportive Measures:    active listening utilized    decision-making supported    self-care encouraged    relaxation techniques promoted  Taken 7/29/2022 2000 by Tiffani Morgan, RN  Supportive Measures:    active listening utilized    decision-making supported    self-care encouraged    relaxation techniques promoted

## 2022-07-30 NOTE — PROGRESS NOTES
Pulmonary Medicine  Cystic Fibrosis - Lung Transplant Team  Progress Note  2022       Patient: Sofie Rodriguez  MRN: [id number]  : 1960 (age 62 year old)  Transplant: 2022 (Lung), POD#32  Admission date: 2022    Assessment & Plan:     Sofie Rodriguez is a 62 year old female with a PMH significant for end-stage COPD, HTN, HFpEF, Mycobacterium peregrinum colonization, h/o hepatitis C, HECTOR s/p LEEP procedure, osteopenia, and former methamphetamine use.  Pt. is now s/p BSLT on 22, lungs slightly undersized.   Persistent low dose pressor needs post-op through 7/10.  Extubated POD #2 but with persistent hypercapnia, mostly compensated and only slightly improved with intermittent BiPAP.  Also with left radial artery thrombus (presumed secondary to arterial line) s/p thrombectomy /, BACILIO, and C diff.  Rehabilitation and airway clearance initially limited by dysrhythmia and gastric discomfort, now with gradual improvement.  Remains on 0.5-2L oxymask (SpO2 high 80s on RA) during the day and BiPAP overnight.  S/p GJ tube placement in IR .      Today's recommendations:  - GT clamped   - tacro level 8 hour draw is being reported at 30, which is very odd given held dose yesterday morning and reduction. Will hold tonight's dose, repeat 12 hour trough TONIGHT at 20:00 (ordered). Tacro held for tomorrow morning until level returns.  - Difficulty with diuresis given her retention and met alk, diamox given per primary team  - Recommend IVIG today with premedications  - Okay to leave on room air and see if she can self recover within 5 minutes  - Okay for small amounts of food, but advised to stop if bloating and early satiety  - Tacrolimus level pending, will adjust for you  - DSA, EBV, and donor risk labs () pending  - Decreased nebs and chest physiotherapy to TID  - continue BiPAP overnight/with naps, VBG ordered every morning  - Following pending pleural cultures, right chest tube to remain  (followed by CVTS), left chest tube to remain to help keep lung up and approximated to chest wall (placed by IR)  - CXR daily as below  - Repeat inspection bronch scheduled 8/2 at 11am, NPO at 0500  - Prednisone taper next due 8/4 (not yet ordered)  - Continuing dapsone at intermittent dosing given slow anemia, suspect phlebotomized  - Defer transition to DOAC at this time given chest tubes     S/p bilateral sequential lung transplant (BSLT) for end stage COPD:  Persistent hypercapneic respiratory failure:   Persistent hypotension, Resolved:   Bilateral hydroPTX:  Right hemidiaphragm palsy: Explant pathology with severe emphysema with subpleural bullae formation, changes of chronic bronchitis, subpleural scars and patchy pulmonary edema; benign hilar lymph nodes.  No evidence of PGD post-op.  Extubated 6/30.  Persistent hypercapnia without dyspnea, appears to be a respiratory drive problem.  Persistent low dose pressors weaned off 7/10 and scheduled midodrine topped 7/19.  SNIFF (7/14) notable for right hemidiaphragm palsy.  Chest CT (7/18) with bilateral biapical effusions R>L and improved LLL atelectasis, also with LLL hydroPTX and bilateral PTX; bilateral chest tubes not communicating well with loculated effusion areas.  Bronch (7/19) with slight graying of mucosa noted at right anastomosis and scant secretions (slightly increased in RLL).  Left surgical chest tube removed 7/20.  Chest CT (7/23) with increased loculated fluid within the left hemithorax with specks of air density in the loculated fluid, similar appearing loculated right hydroPTX with minimal decrease in fluid component (right chest tube appears to be outside the loculated hydroPTX), increased size of pleural based lesion in MARLON, and mild subcutaneous emphysema along right chest tube with minimal along tract of removed left chest tube.  S/p left apical chest tube placed by IR 7/25, exudative.  On 0.5-2L oxymask while awake (SpO2 high 80s on  RA), BiPAP overnight (ongoing hypercapnia).  - DSA one month post-transplant (7/28, pending)  - Ammonia monitoring twice weekly (screening for hyperammonemia post-lung transplant)  - Nebs: levalbuterol and Mucomyst TID  - Pulmonary toilet with chest physiotherapy TID  - Aerobika and incentive spirometry hourly while awake  - Supplemental oxygen as needed to maintain SpO2 >92% (wean as able), BiPAP overnight/with naps given persistent hypercapnia, VBG every morning (ordered)  - Right chest tube (managed by surgical team) and left apical chest tube (placed by IR), remain with low-moderate output  - CXR daily while chest tubes remain  - Chest CT without contrast 7/28 unlikely to be able to place pigtail or thora the effusion remaining, will attempt diuresis once GT output decreases and met alkalosis improves  - Volume management per primary team  - Repeat inspection bronch next week (to evaluate anastomoses), scheduled 8/2 at 11am, NPO at 0500  - Encourage activity including up to the chair and PT/OT  - Pain management per primary team     Immunosuppression:  Induction therapy with basiliximab (and high dose IV steroid) given intraoperatively, repeating basiliximab dose on POD#4.  - Tacrolimus 4 mg BID.  Goal level 8-12.  Level (7/29) supratherapeutic at 15.6 11 hours, unclear why the rapid increase, held dose 7/29 AM and resume at 4 mg bid on 7/29pm, will trend level 7/30  -  mg BID (decreased 7/27, GI symptoms/leukopenia), consider holding if WBC <3 and will transition back to PO Myfortic once tolerating PO medications consistently with improvement in gastroparesis   - Prednisone 12.5 mg BID, next taper due 8/4 (not yet ordered)  Date AM dose (mg) PM dose (mg)   7/28/22 12.5 12.5   8/4/22 12.5 10   8/18/22 10 10   9/15/22 10 7.5   10/13/22 7.5 7.5   11/10/22 7.5 5   12/8/22 5 5   1/5/23 5 2.5      Prophylaxis:   - Dapsone for PJP ppx (7/18), defer increasing to daily pending hgb stability (once consistently  >8, revisit 7/30)  - VGCV for CMV ppx, CMV negative 7/25  - Nystatin for oral candidiasis ppx, 6 month course  - See below for serologies and viral ppx:    Donor Recipient Intervention   CMV status Positive Positive Valganciclovir POD #8-90   EBV status Positive Positive EBV check monthly (7/28, pending)   HSV status N/A Positive Not indicated      ID:  H/o M. peregrinum colonization: Donor bronch cultures (OSH) with Strep beta hemolytic (not group A).  Recipient cultures as below.  Bronch cultures (7/12) NGTD.  - AFB bronch culture (6/28, 6/29, 7/12) NGTD, AFB to be sent on all future bronchs  - Right pleural cultures (7/20) NGTD  - Left pleural cultures (7/25) NGTD     Streptococcus pneumoniae:  Stenotrophomonas maltophilia: Noted in recipient cultures at time of transplant.  S/p ceftazidime 6/28-7/10, vancomycin 7/7-7/8 and 6/28-6/30, and levofloxacin 7/10-7/12 for total 2 week ABX course.    Hypogammaglobulinemia: IgG adequate at time of transplant (1,381) and now low (336) on 7/28.  - Recommend IVIG with premedications on 7/30     H/o hepatitis C: Diagnosed in 1980s, 2 mos of treatment, quant negative since 10/2017, last positive 2/20/17 ([phone]).  Glenis positive on 6/2021 with negative HCV PCR.  H/o remote EtOH abuse.  MR elastography (4/27/21) with hepatology review and consult without any concerns post transplant.  Hepatitis C RNA negative and hepatitis C antibody positive (old) on 6/28.     PHS risk criteria donor:  Additional labs required post-transplant (between 4-8 weeks post-op): Hepatitis B, Hepatitis C, and HIV by SHERI ([id number], pending 7/28).     Other relevant problems managed by primary team:      SVT:   Aflutter with RVR: SVT first noted on 7/14, prior to HD line placement.  Continues intermittently.  Aflutter with RVR to 200 7/17 triggered by activity.  EP consulted 7/17 given persistent tachycardia/dysrhythmia.  Midodrine held 7/19.  - Metoprolol per primary team (started 7/15)  - Heparin  drip (7/17), defer transition to DOAC at this time given chest tubes     Left hand ischemia: Radial artery thrombosis identified on duplex doppler.  S/p thrombectomy on 7/2.  Completed high intensity heparin course.  Continue daily aspirin.      BACILIO:   Hyperkalemia: Likely multifactorial including medications (Bactrim, tacrolimus) and hypotension.  Fludrocortisone 7/6-7/11.  Potassium now stable.  S/p non-tunneled HD line 7/14.  Initial iHD run 7/14 limited by afib with RVR vs SVT.  Last iHD run 7/16, line removed 7/22.Cr rising to 1.5 on 7/29 with diamox and elevated tac level.   - Tacrolimus monitoring as above  - Management per nephrology and primary team     Abdominal pain: Noted 7/15, cramping pain.  ACR with large stool burden in colon, no obstruction or distention.  Some nausea but no emesis.  CT abdomen (7/15) with moderate to large gastric distention, otherwise without obstruction.  NG placed for LIS with moderate to large output for several days.  Increased abdominal pain 7/17 after clamping for 4 hours, tolerated clamping today without issue.  Gastric emptying study (7/20) with severe gastric emptying delay (95% retention at 4 hours).  S/p GJ tube placement in IR 7/27.  - Simethicone prn  - TF management per RD and primary team, G tube clamped and TF via J tube    C diff infection: Abdominal pain with diarrhea noted 7/8, AXR without dilated bowel, moderate colonic stool burden.  C diff positive 7/11.  Loose stools (on tube feeds) stable.  - PO vancomycin (7/11-7/28) per primary team, will need to revisit resuming if to begin on ABX     Hypomagnesemia: Suppressed absorption d/t CNI.   - Continue daily magnesium with replacement protocol prn, schedule oral magnesium supplementation if needed pending improvement in stools    Anemia: Suspect related to blood draws.   - s/p Transfusion 1U PRBCs on 7/30    We appreciate the excellent care provided by the [place] [id number] team.  Recommendations communicated via  "in person rounding and this note.  Will continue to follow along closely, please do not hesitate to call with any questions or concerns.    Claribel Franks MD  Pulmonary Transplant/CF Attending  Pager: [phone]    Subjective & Interval History:   Increased oxycodone dose overnight with improvement in pain. 2L NC and Bipap 15/5 30% for most of the night. G remains clamped. Weaned to room air this morning, SPO2 occasionally drops <90 but she is able to recover quickly without too much effort. Denies any abdominal discomfort, and is asking to eat one piece of nino. Denies abdominal distention, denies nausea/vomiting, or heartburn. No longer having copious diarrhea.     Review of Systems:     C: No fever, no chills, + increased weight  INTEGUMENTARY/SKIN: No rash or obvious new lesions  ENT/MOUTH: No sore throat, no sinus pain, no nasal congestion or drainage  RESP: See interval history  CV: chest pain improved, no palpitations, + peripheral edema  GI: See interval history  : No dysuria  MUSCULOSKELETAL: See interval history  ENDOCRINE: Blood sugars with adequate control  NEURO: No headache, no numbness or tingling  PSYCHIATRIC: Mood stable a little anhedonic this morning    Physical Exam:     All notes, images, and labs from past 24 hours (at minimum) were reviewed.    Vital signs:  Temp: 98.3  F (36.8  C) Temp src: Oral BP: 126/67 Pulse: 105   Resp: 16 SpO2: 99 % O2 Device: Nasal cannula Oxygen Delivery: 1 LPM Height: 157.5 cm (5' 2\") Weight: 75.1 kg (165 lb 9.1 oz)  I/O:   L CT 0  R       Intake/Output Summary (Last 24 hours) at 7/30/2022 0833  Last data filed at 7/30/2022 0600  Gross per 24 hour   Intake 1329.88 ml   Output 1555 ml   Net -225.12 ml           Constitutional: Lying in bed, in no apparent distress.   HEENT: Eyes with pink conjunctivae, anicteric.  Oral mucosa moist without lesions.    PULM: Diminished air flow t/o left and to RLL.  No crackles, no rhonchi, no wheezes.  Non-labored breathing on " 1L NC.  Bilateral chest tubes without air leak.  CV: Normal S1 and S2.  Tachycardic.  No murmur, gallop, or rub.  Trace BLE edema.   ABD: NABS, soft, + tender t/o, nondistended.    MSK: Moves all extremities.  + muscle wasting.   NEURO: Alert, conversant.   SKIN: Warm, dry.  No rash on limited exam.  Clamshell incision not visualized.   PSYCH: Mood stable.    Lines, Drains, and Devices:  Peripheral IV 07/20/22 Anterior;Right Lower forearm (Active)   Site Assessment WDL 07/28/22 0400   Line Status Infusing 07/28/22 0400   Dressing Intervention New dressing  07/20/22 1548   Phlebitis Scale 0-->no symptoms 07/28/22 0400   Infiltration Scale 0 07/28/22 0400   Number of days: 8     Data:     LABS    CMP:   Recent Labs   Lab 07/30/22  0350 07/30/22  0348 07/30/22  0037 07/29/22  2027 07/29/22  0818 07/29/22  0446 07/28/22  1049 07/28/22  0925 07/27/22  0801 07/27/22  0646 07/26/22  1245 07/26/22  0735 07/25/22  0751 07/25/22  0648 07/24/22  0817 07/24/22  0754     --   --   --   --  145  --  144  --  143  --  140  --  143  --  142   POTASSIUM 4.5  --   --   --   --  4.5  --  4.1  --  4.9  --  4.3  --  4.6  --  4.3   CHLORIDE 91*  --   --   --   --  91*  --  90*  --  90*  --  90*  --  93*  --  93*   CO2 49*  --   --   --   --  48*  --  >50*  --  45*  --  47*  --  47*  --  45*   ANIONGAP 2*  --   --   --   --  6*  --   --   --  8  --  3*  --  3*  --  4*   * 178* 175* 132*   < > 165*   < > 120*   < > 85   < > 166*   < > 172*   < > 144*   BUN 84.2*  --   --   --   --  86.8*  --  88.1*  --  84.6*  --  89.2*  --  91.1*  --  95.9*   CR 1.61*  --   --   --   --  1.50*  --  1.44*  --  1.35*  --  1.25*  --  1.20*  --  1.32*   GFRESTIMATED 36*  --   --   --   --  39*  --  41*  --  45*  --  49*  --  52*  --  46*   ESTUARDO 9.1  --   --   --   --  9.2  --  9.6  --  9.7  --  9.5  --  9.5  --  9.7   MAG 2.6*  --   --   --   --  2.5*  --  2.5*  --  2.4*  --  2.3  --  2.4*  --  2.5*   PHOS 4.8*  --   --   --   --  4.3  --  3.8  --   4.0  --  3.8  --  4.0  --  4.1   PROTTOTAL  --   --   --   --   --   --   --  5.2*  --   --   --   --   --  4.7*  --  4.6*   ALBUMIN  --   --   --   --   --   --   --  3.0*  --   --   --   --   --  2.6*  --   --    BILITOTAL  --   --   --   --   --   --   --  0.2  --   --   --   --   --  0.2  --   --    ALKPHOS  --   --   --   --   --   --   --  60  --   --   --   --   --  61  --   --    AST  --   --   --   --   --   --   --  17  --   --   --   --   --  19  --   --    ALT  --   --   --   --   --   --   --  12  --   --   --   --   --  15  --   --     < > = values in this interval not displayed.     CBC:   Recent Labs   Lab 07/30/22 0350 07/29/22  0446 07/28/22  0925 07/27/22  0646   WBC 5.9 4.9 3.9* 3.1*   RBC 2.08* 2.19* 2.70* 2.61*   HGB 6.7* 7.0* 8.7* 8.3*   HCT 22.4* 23.8* 29.6* 27.8*   * 109* 110* 107*   MCH 32.2 32.0 32.2 31.8   MCHC 29.9* 29.4* 29.4* 29.9*   RDW 16.3* 16.3* 16.9* 16.6*    265 240 235       INR:   Recent Labs   Lab 07/27/22  0646   INR 0.87       Glucose:   Recent Labs   Lab 07/30/22 0350 07/30/22  0348 07/30/22  0037 07/29/22 2027 07/29/22  1624 07/29/22  1159   * 178* 175* 132* 181* 165*       Blood Gas:   Recent Labs   Lab 07/30/22 0350 07/29/22  0453 07/28/22  1523   PHV 7.43 7.43 7.41   PCO2V 76* 78* 88*   PO2V 43 37 45   HCO3V 50* 52* 56*   LIDNY 23.3* 24.0* 26.4*   O2PER 30 30 23       Culture Data No results for input(s): CULT in the last 168 hours.    Virology Data:   Lab Results   Component Value Date    FLUAH1 Not Detected 06/29/2022    FLUAH3 Not Detected 06/29/2022    JJ9736 Not Detected 06/29/2022    IFLUB Not Detected 06/29/2022    RSVA Not Detected 06/29/2022    RSVB Not Detected 06/29/2022    PIV1 Not Detected 06/29/2022    PIV2 Not Detected 06/29/2022    PIV3 Not Detected 06/29/2022    HMPV Not Detected 06/29/2022       Historical CMV results (last 3 of prior testing):  Lab Results   Component Value Date    CMVQNT Not Detected 07/25/2022     No results  found for: CMVLOG    Urine Studies    Recent Labs   Lab Test 07/08/22  0831 07/05/22  1004   URINEPH 5.5 5.5   NITRITE Negative Negative   LEUKEST Negative Negative   WBCU 1 5       Most Recent [facility] Pulmonary Function Testing (FVC/FEV1 only)  FVC-Pre   Date Value Ref Range Status   04/29/2022 1.82 L    11/11/2021 2.17 L    06/14/2021 2.00 L      FVC-%Pred-Pre   Date Value Ref Range Status   04/29/2022 58 %    11/11/2021 70 %    06/14/2021 64 %      FEV1-Pre   Date Value Ref Range Status   04/29/2022 0.51 L    11/11/2021 0.53 L    06/14/2021 0.54 L      FEV1-%Pred-Pre   Date Value Ref Range Status   04/29/2022 20 %    11/11/2021 21 %    06/14/2021 21 %        IMAGING    Recent Results (from the past 48 hour(s))   XR Chest 2 Views    Narrative    Chest 2 views    INDICATION: Interval follow-up, lung transplant, bilateral chest tubes    COMPARISON: Yesterday    FINDINGS: Clamshell sternotomy from prior bilateral lung transplant  again noted. Chest tubes again noted. Heart size normal. Left  perihilar opacity unchanged. Thickening in the right minor fissure  increased. Feeding tube beyond the inferior margin of the image.  Calcification at the aortic knob. No new ectopic air collections.  Continued costophrenic angle blunting and haziness in the left lower  lung.      Impression    IMPRESSION: Continued left pleural effusion and associated  atelectasis. Continued prominent perihilar opacity on the left.  Increased small pleural effusion or atelectasis on the right. Prior  bilateral lung transplantation.    ALVINA HARRY MD         SYSTEM ID:  [id number]   CT Chest w/o Contrast    Impression    RESIDENT PRELIMINARY INTERPRETATION  IMPRESSION:   1.  Small left loculated anteroinferior hydropneumothorax, increased  from prior.  2.  Stable loculated right hydropneumothorax with right basilar chest  tube that does not appear to be continuous with any fluid collections.  3.  Stable left upper lobe subpleural lesion  measuring up to 2.0 cm.  Recommend close attention on follow-up.  4.  Enlarged bilateral hilar lymph nodes, likely reactive.

## 2022-07-30 NOTE — PROGRESS NOTES
End of Shift Summary.   AOX4. Daughter at bedside throughout shift. Concerns over plan of care and pain management; concerns reported to gold 10. Hemodynamically stable. NC 2 L saturating >92%. Npo with G/J tube. Continues on TF at goal. With G tube to gravity. Up to bedside commode.   Continues on Heparin @850 units.         Plan:   Bronchoscopy 8/2 @1100 NPO order in place for 0500t  Heparin Xa lab draw @2000    See flowsheets for vital signs and detailed assessment.    Problem: Infection (Lung Surgery)  Goal: Absence of Infection Signs and Symptoms  Intervention: Prevent or Manage Infection  Recent Flowsheet Documentation  Taken 7/29/2022 1600 by Ana Luisa Jimenes RN  Infection Prevention:    cohorting utilized    environmental surveillance performed    equipment surfaces disinfected    hand hygiene promoted    personal protective equipment utilized    rest/sleep promoted    single patient room provided    visitors restricted/screened  Taken 7/29/2022 1200 by Ana Luisa Jimenes RN  Infection Prevention:    cohorting utilized    environmental surveillance performed    equipment surfaces disinfected    hand hygiene promoted    personal protective equipment utilized    rest/sleep promoted    single patient room provided    visitors restricted/screened  Taken 7/29/2022 0800 by Ana Luisa Jimenes RN  Infection Prevention:    cohorting utilized    environmental surveillance performed    equipment surfaces disinfected    hand hygiene promoted    personal protective equipment utilized    rest/sleep promoted    single patient room provided    visitors restricted/screened   Goal Outcome Evaluation:    Plan of Care Reviewed With: patient, daughter     Overall Patient Progress: no change    Outcome Evaluation: Plans for Bronchoscopy 8/2

## 2022-07-30 NOTE — PROGRESS NOTES
[facility]    Medicine Progress Note - Hospitalist Service, GOLD TEAM 10    Date of Admission:  6/28/2022    Assessment & Plan  Sofie Rodriguez is a 62 year old female admitted on 6/28/2022. She has PMH of end stage COPD s/p b/l lung transplant on 6/28/2022, HTN, HFpEF, h/o hepC, oteopenia, and former methamphetamine use, and she was transferred to [place] [address] [facility] from MICU on 7/15/2022. She was admitted to the MICU on 7/13/20222 with prior hospital course complicated by left upper extremity acute limb ischemia s/p left radial thrombectomy on 7/1/2022. She was primarily treated for acute hypercapnic respiratory failure on intermittent BIPAP with worsening BACILIO while in the MICU. Breathing is improving, but patient has severely delayed gastric emptying found on NM study. PEGJ placement ordered, pending supply chain issue resolution.       Today's Plan:  - 7/27: s/p IR placed PEGJ    G: CLAMPED, and [place] dosing   J for feeding and meds  - Heparin gtt to resume post PEGJ, DOAC prior to discharge  - Continue to encourage OOB and up in chair  - BIPAP at night, VBG in the AM  - Follow-up pleural fluid studies for L Apical Fluid Collection, Pigtail by IR 7/25  - 250 mg Diamox dose today  - Trial 1 piece [place] today, per pt request   > Caution diet advancement as severe gastroparesis history  - 1 unit prbc given for Hb 6.7  - 7/30: IVIG dose     End stage COPD s/p BOLT 6/28/2022  Acute hypercapnic hypoxic respiratory failure (improving), likely 2/2 decreased central respiratory drive vs respiratory muscle weakness and some pulmonary edema / fluid Transplanted 6/28. formal SNIFF test was performed on 7/14 showed R hemidiaphragm palsy. Of note transplanted lungs were also considered small for body size and could contribute to decreased respiratory compensation for hypercarbia. VBG relatively stable, most recent pCO2 74 (7/22).   - Continue BIPAP at night   - Simple mask w  intermittent BIPAP for night and daytime naps; goal to keep O2sat above 92%  - f/u myasthenia gravis panel (7/14: negative)  - oxycodone 2.5-5mg q4h PRN   - encourage activity and getting up in bed; PT/OT participation  - encourage chest physiotherapy QID    Immunosuppression:  - Prednisone 12.5 BID  - Tacrolimus 5.5mg BID (trough goal 8-12)   -  BID  Prophylasxis:  - CMV - Valgancyclover  - Thrush - PO nysatin  - PJP - TMP-SMX (currently held given BACILIO); dapsone started 7/18 at 50mg qMWF (will try to increase to daily on 7/25 per pulm)     Pleural Effusion, Right and Left  - 1 R chest tube (basilar) in place currently (pericardial drain was removed 7/15, L basilar was removed 7/20). CT chest 7/14 showing unchanged bilateral effusions and unchanged biapical pneumothoraces with resolved left basilar pneumothorax; bibasilar chest tubes in place with pericardial drain (which was removed 7/15).   - daily CXR  - RIGHT Chest Tube - still with outputs noted  - LEFT Apical Fluid collection - IR targeting via CT guided pigtail placement     Hypotension, resolved  H/o HTN  H/o HFpEF  Likely vasoplegia post operatively. Last echo 7/7 normal EF with good LV function. S/p hydrocortisone 7/6-7/9 and fludrocortisone 7/6-7/11 without significant improvement.  - off midorine 7/19  - Holding PTA lasix 20mg daily     C.diff - vanco started 7/12, may need prolonged course given IS  Abdominal pain - due to volume / c diff / gastro paresis   Severe Gastroparesis - gastric emptying study 7/20  - PO vanc 125mg QID 7/12 to present  - gastric emptying study 7/20 showed delayed gastric emptying with retention of 95% of stomach contents after 4 hours; please keep patient NPO except tube feeds and meds  - Simethicone scheduled  - 7/27: SLAVA SANTOS tube  Feeding regimen:   - Adult Formula Drip Feeding: Continuous Novasource Renal; Nasojejunal; Goal Rate: 35; initiate at goal rate; mL/hr; Medication - Feeding Tube Flush Frequency: At least  15-30 mL water before and after medication administration and with tube clogging     BACILIO  Hypermagnesemia  Hyperphosphatemia  Baseline renal function was normal prior to surgery. New onset renal failure after transplant, likely multifactorial due to pre-renal hypotension, less likely nephrotoxic agents. Overall kidney function improving. Today, Cr fluctuating but BUN increasing, with unclear urine output (7/22).   - HD cath removed 7/22, trend metabolites     Tachyarrthymia  Paroxysmal afib  Paroxysmal aflutter/AT  SVT vs afib.Had an occurrence during HD on 7/14. Had afib with RVR to 200s on 7/17. EP consult placed.asmyptomatic and stable during episodes. Aflutter episode (7/17) with transfer. Vagal maneuver responsive at time. No recent tachyarrhythmia episodes (7/22).   - Per EP: patient has had episodes of possible flutter (vs AT with 2:1 conduction) and afib with RVR  - heparin drip and transition to DOAC after PEGJ placement and chest tubes resolved (CHADS-VASc score of 2)  - On telemetry  - On metoprolol tartrate 25mg BID  - Discharge on ziopatch for 14 days with EP follow up in 1-2 months after     Stress-induced hyperglycemia  -200s over past day.   - on 15U glargine BID; continue today and re-evaluate as needed     Acute Blood Loss Anemia, stable  Initially from acute blood loss. Hb dropped to 6.8 on 7/14, requiring 1U pRBC. Post-transfusion Hb 9.4 > 8.3 > 8.6 > 8.7 (7/18).   - continue to monitor  - transfuse for hgb<7        Diet: Adult Formula Drip Feeding: Continuous Novasource Renal; Jejunostomy; Goal Rate: 35; mL/hr; Medication - Feeding Tube Flush Frequency: At least 15-30 mL water before and after medication administration and with tube clogging; Amount to Send (Nutri...  NPO per Anesthesia Guidelines for Procedure/Surgery Except for: No Exceptions  Regular Diet Adult    DVT Prophylaxis: heparin  Dong Catheter: Not present  Central Lines: PRESENT       Cardiac Monitoring: None  Code Status:  Full Code      Disposition Plan        The patient's care was discussed with the Patient, Patient's Family and Pulm Tx Consultant.    Catalino Pantoja MD  Hospitalist Service, GOLD TEAM [address] [place] [address]  Securely message with the Vocera Web Console (learn more here)  Text page via [facility] Paging/Directory   Please see signed in provider for up to date coverage information      Clinically Significant Risk Factors Present on Admission                      ______________________________________________________________________    Interval History   Seen today for follow up: Post lung transplant, pleural effusion, chest tubes    No acute overnight events per patient or patient's family.    This AM, pt feels breathing is better with increased Oxy dosing, can take deeper breaths. NO fever, chills. O2 sats prn drop but with focused breathing, can remain off oxygen.    Denies chest pain except for the pain at pigtail site  prbc completed    As of today/at time of my visit:  Patient denies any headache, sore throat  Patient denies any sleeping disturbance  Patient denies any nausea or vomiting  Patient reports no abdominal pain  Patient denies any chest pain  Patient reports no arm or leg edema      Data reviewed today: I reviewed all medications, new labs and imaging results over the last 24 hours. I personally reviewed no images or EKG's today.    Physical Exam   Vital Signs: Temp: 98.4  F (36.9  C) Temp src: Oral BP: 129/74 Pulse: 97   Resp: 12 SpO2: 98 % O2 Device: Nasal cannula Oxygen Delivery: 1 LPM  Weight: 165 lbs 9.05 oz  General: non-distressed adult  HEENT: Normocephalic, atraumatic, pupils equal round reactive, membranes moist  CV: normal capillary refill, heart regular rate and rhythm, tele  Respiratory: Non-labored breathing, bronchovesicular lung sounds, R Chest Tube noted, draining serosangiunous. L apical chest tube in place  Abdominal: soft, mildly tender in the  epigastrium, no rebound, no guarding, no rigidity, +bowel sounds, PEGJ in place  Genitourinary: no suprapubic tenderness  Musculoskeletal: normal bulk and tone  Skin: no rash, normal turgor  Neurologic: no facial droop, moving upper and lower extremities spontaneously, sensation in tact to light touch on extremities  Psychiatric: normal mood and affect    Data   Recent Labs   Lab 07/30/22  1203 07/30/22  0838 07/30/22  0350 07/29/22  0818 07/29/22  0446 07/28/22  1049 07/28/22  0925 07/27/22  0801 07/27/22  0646 07/25/22  0751 07/25/22  0648   WBC  --   --  5.9  --  4.9  --  3.9*  --  3.1*   < > 4.0   HGB  --   --  6.7*  --  7.0*  --  8.7*  --  8.3*   < > 7.3*   MCV  --   --  108*  --  109*  --  110*  --  107*   < > 107*   PLT  --   --  244  --  265  --  240  --  235   < > 267   INR  --   --   --   --   --   --   --   --  0.87  --   --    NA  --   --  142  --  145  --  144  --  143   < > 143   POTASSIUM  --   --  4.5  --  4.5  --  4.1  --  4.9   < > 4.6   CHLORIDE  --   --  91*  --  91*  --  90*  --  90*   < > 93*   CO2  --   --  49*  --  48*  --  >50*  --  45*   < > 47*   BUN  --   --  84.2*  --  86.8*  --  88.1*  --  84.6*   < > 91.1*   CR  --   --  1.61*  --  1.50*  --  1.44*  --  1.35*   < > 1.20*   ANIONGAP  --   --  2*  --  6*  --   --   --  8   < > 3*   ESTUARDO  --   --  9.1  --  9.2  --  9.6  --  9.7   < > 9.5   * 158* 185*   < > 165*   < > 120*   < > 85   < > 172*   ALBUMIN  --   --   --   --   --   --  3.0*  --   --   --  2.6*   PROTTOTAL  --   --   --   --   --   --  5.2*  --   --   --  4.7*   BILITOTAL  --   --   --   --   --   --  0.2  --   --   --  0.2   ALKPHOS  --   --   --   --   --   --  60  --   --   --  61   ALT  --   --   --   --   --   --  12  --   --   --  15   AST  --   --   --   --   --   --  17  --   --   --  19    < > = values in this interval not displayed.

## 2022-07-31 ENCOUNTER — APPOINTMENT (OUTPATIENT)
Dept: GENERAL RADIOLOGY | Facility: CLINIC | Age: 62
DRG: 007 | End: 2022-07-31
Attending: PHYSICIAN ASSISTANT
Payer: MEDICARE

## 2022-07-31 LAB
ANION GAP SERPL CALCULATED.3IONS-SCNC: 3 MMOL/L (ref 7–15)
BASE EXCESS BLDV CALC-SCNC: 20.3 MMOL/L (ref -7.7–1.9)
BASOPHILS # BLD MANUAL: 0 10E3/UL (ref 0–0.2)
BASOPHILS NFR BLD MANUAL: 0 %
BUN SERPL-MCNC: 88.3 MG/DL (ref 8–23)
CALCIUM SERPL-MCNC: 8.9 MG/DL (ref 8.8–10.2)
CHLORIDE SERPL-SCNC: 89 MMOL/L (ref 98–107)
CREAT SERPL-MCNC: 1.86 MG/DL (ref 0.51–0.95)
DEPRECATED HCO3 PLAS-SCNC: 46 MMOL/L (ref 22–29)
EOSINOPHIL # BLD MANUAL: 0 10E3/UL (ref 0–0.7)
EOSINOPHIL NFR BLD MANUAL: 0 %
ERYTHROCYTE [DISTWIDTH] IN BLOOD BY AUTOMATED COUNT: 19.1 % (ref 10–15)
GFR SERPL CREATININE-BSD FRML MDRD: 30 ML/MIN/1.73M2
GLUCOSE BLDC GLUCOMTR-MCNC: 106 MG/DL (ref 70–99)
GLUCOSE BLDC GLUCOMTR-MCNC: 114 MG/DL (ref 70–99)
GLUCOSE BLDC GLUCOMTR-MCNC: 123 MG/DL (ref 70–99)
GLUCOSE BLDC GLUCOMTR-MCNC: 138 MG/DL (ref 70–99)
GLUCOSE BLDC GLUCOMTR-MCNC: 142 MG/DL (ref 70–99)
GLUCOSE BLDC GLUCOMTR-MCNC: 166 MG/DL (ref 70–99)
GLUCOSE SERPL-MCNC: 152 MG/DL (ref 70–99)
HCO3 BLDV-SCNC: 48 MMOL/L (ref 21–28)
HCT VFR BLD AUTO: 26.7 % (ref 35–47)
HGB BLD-MCNC: 8 G/DL (ref 11.7–15.7)
LYMPHOCYTES # BLD MANUAL: 0.3 10E3/UL (ref 0.8–5.3)
LYMPHOCYTES NFR BLD MANUAL: 4 %
MAGNESIUM SERPL-MCNC: 2.7 MG/DL (ref 1.7–2.3)
MCH RBC QN AUTO: 30.7 PG (ref 26.5–33)
MCHC RBC AUTO-ENTMCNC: 30 G/DL (ref 31.5–36.5)
MCV RBC AUTO: 102 FL (ref 78–100)
MONOCYTES # BLD MANUAL: 0.1 10E3/UL (ref 0–1.3)
MONOCYTES NFR BLD MANUAL: 2 %
MYELOCYTES # BLD MANUAL: 0.3 10E3/UL
MYELOCYTES NFR BLD MANUAL: 4 %
NEUTROPHILS # BLD MANUAL: 5.7 10E3/UL (ref 1.6–8.3)
NEUTROPHILS NFR BLD MANUAL: 90 %
O2/TOTAL GAS SETTING VFR VENT: 28 %
PCO2 BLDV: 78 MM HG (ref 40–50)
PH BLDV: 7.4 [PH] (ref 7.32–7.43)
PHOSPHATE SERPL-MCNC: 5.2 MG/DL (ref 2.5–4.5)
PLAT MORPH BLD: ABNORMAL
PLATELET # BLD AUTO: 252 10E3/UL (ref 150–450)
PO2 BLDV: 36 MM HG (ref 25–47)
POTASSIUM SERPL-SCNC: 4.5 MMOL/L (ref 3.4–5.3)
RBC # BLD AUTO: 2.61 10E6/UL (ref 3.8–5.2)
RBC MORPH BLD: ABNORMAL
SODIUM SERPL-SCNC: 138 MMOL/L (ref 136–145)
TACROLIMUS BLD-MCNC: 15 UG/L (ref 5–15)
TACROLIMUS BLD-MCNC: 22.5 UG/L (ref 5–15)
TACROLIMUS BLD-MCNC: 30.1 UG/L (ref 5–15)
TME LAST DOSE: ABNORMAL H
TME LAST DOSE: NORMAL H
TME LAST DOSE: NORMAL H
UFH PPP CHRO-ACNC: 0.34 IU/ML
WBC # BLD AUTO: 6.3 10E3/UL (ref 4–11)

## 2022-07-31 PROCEDURE — 120N000002 HC R&B MED SURG/OB UMMC

## 2022-07-31 PROCEDURE — 85027 COMPLETE CBC AUTOMATED: CPT | Performed by: STUDENT IN AN ORGANIZED HEALTH CARE EDUCATION/TRAINING PROGRAM

## 2022-07-31 PROCEDURE — 250N000009 HC RX 250: Performed by: PHYSICIAN ASSISTANT

## 2022-07-31 PROCEDURE — 250N000013 HC RX MED GY IP 250 OP 250 PS 637: Performed by: STUDENT IN AN ORGANIZED HEALTH CARE EDUCATION/TRAINING PROGRAM

## 2022-07-31 PROCEDURE — 71046 X-RAY EXAM CHEST 2 VIEWS: CPT

## 2022-07-31 PROCEDURE — 99233 SBSQ HOSP IP/OBS HIGH 50: CPT | Mod: 24 | Performed by: INTERNAL MEDICINE

## 2022-07-31 PROCEDURE — 82803 BLOOD GASES ANY COMBINATION: CPT | Performed by: STUDENT IN AN ORGANIZED HEALTH CARE EDUCATION/TRAINING PROGRAM

## 2022-07-31 PROCEDURE — 94640 AIRWAY INHALATION TREATMENT: CPT

## 2022-07-31 PROCEDURE — 83735 ASSAY OF MAGNESIUM: CPT | Performed by: STUDENT IN AN ORGANIZED HEALTH CARE EDUCATION/TRAINING PROGRAM

## 2022-07-31 PROCEDURE — 250N000013 HC RX MED GY IP 250 OP 250 PS 637: Performed by: NURSE PRACTITIONER

## 2022-07-31 PROCEDURE — 71046 X-RAY EXAM CHEST 2 VIEWS: CPT | Mod: 26 | Performed by: STUDENT IN AN ORGANIZED HEALTH CARE EDUCATION/TRAINING PROGRAM

## 2022-07-31 PROCEDURE — 80048 BASIC METABOLIC PNL TOTAL CA: CPT | Performed by: STUDENT IN AN ORGANIZED HEALTH CARE EDUCATION/TRAINING PROGRAM

## 2022-07-31 PROCEDURE — 84100 ASSAY OF PHOSPHORUS: CPT | Performed by: STUDENT IN AN ORGANIZED HEALTH CARE EDUCATION/TRAINING PROGRAM

## 2022-07-31 PROCEDURE — 250N000012 HC RX MED GY IP 250 OP 636 PS 637: Performed by: PHYSICIAN ASSISTANT

## 2022-07-31 PROCEDURE — 85007 BL SMEAR W/DIFF WBC COUNT: CPT | Performed by: STUDENT IN AN ORGANIZED HEALTH CARE EDUCATION/TRAINING PROGRAM

## 2022-07-31 PROCEDURE — 250N000011 HC RX IP 250 OP 636: Performed by: STUDENT IN AN ORGANIZED HEALTH CARE EDUCATION/TRAINING PROGRAM

## 2022-07-31 PROCEDURE — 99233 SBSQ HOSP IP/OBS HIGH 50: CPT | Performed by: HOSPITALIST

## 2022-07-31 PROCEDURE — 94640 AIRWAY INHALATION TREATMENT: CPT | Mod: 76

## 2022-07-31 PROCEDURE — 250N000013 HC RX MED GY IP 250 OP 250 PS 637: Performed by: HOSPITALIST

## 2022-07-31 PROCEDURE — 99223 1ST HOSP IP/OBS HIGH 75: CPT | Mod: 24 | Performed by: INTERNAL MEDICINE

## 2022-07-31 PROCEDURE — 80197 ASSAY OF TACROLIMUS: CPT | Performed by: INTERNAL MEDICINE

## 2022-07-31 PROCEDURE — 999N000157 HC STATISTIC RCP TIME EA 10 MIN

## 2022-07-31 PROCEDURE — 250N000013 HC RX MED GY IP 250 OP 250 PS 637: Performed by: THORACIC SURGERY (CARDIOTHORACIC VASCULAR SURGERY)

## 2022-07-31 PROCEDURE — 250N000013 HC RX MED GY IP 250 OP 250 PS 637: Performed by: SURGERY

## 2022-07-31 PROCEDURE — 94668 MNPJ CHEST WALL SBSQ: CPT

## 2022-07-31 PROCEDURE — 250N000011 HC RX IP 250 OP 636

## 2022-07-31 PROCEDURE — 85520 HEPARIN ASSAY: CPT | Performed by: HOSPITALIST

## 2022-07-31 PROCEDURE — 250N000012 HC RX MED GY IP 250 OP 636 PS 637: Performed by: INTERNAL MEDICINE

## 2022-07-31 RX ADMIN — ACETYLCYSTEINE 2 ML: 200 INHALANT RESPIRATORY (INHALATION) at 19:50

## 2022-07-31 RX ADMIN — INSULIN ASPART 1 UNITS: 100 INJECTION, SOLUTION INTRAVENOUS; SUBCUTANEOUS at 00:59

## 2022-07-31 RX ADMIN — CALCIUM CARBONATE 600 MG (1,500 MG)-VITAMIN D3 400 UNIT TABLET 1 TABLET: at 18:07

## 2022-07-31 RX ADMIN — CALCIUM CARBONATE 600 MG (1,500 MG)-VITAMIN D3 400 UNIT TABLET 1 TABLET: at 11:03

## 2022-07-31 RX ADMIN — PREDNISONE 12.5 MG: 2.5 TABLET ORAL at 20:26

## 2022-07-31 RX ADMIN — INSULIN ASPART 2 UNITS: 100 INJECTION, SOLUTION INTRAVENOUS; SUBCUTANEOUS at 16:51

## 2022-07-31 RX ADMIN — Medication 40 MG: at 11:03

## 2022-07-31 RX ADMIN — MYCOPHENOLATE MOFETIL 500 MG: 200 POWDER, FOR SUSPENSION ORAL at 20:26

## 2022-07-31 RX ADMIN — THIAMINE HCL TAB 100 MG 100 MG: 100 TAB at 11:03

## 2022-07-31 RX ADMIN — INSULIN GLARGINE 15 UNITS: 100 INJECTION, SOLUTION SUBCUTANEOUS at 09:43

## 2022-07-31 RX ADMIN — OXYCODONE HYDROCHLORIDE 10 MG: 5 TABLET ORAL at 11:03

## 2022-07-31 RX ADMIN — Medication 40 MG: at 09:36

## 2022-07-31 RX ADMIN — METOPROLOL TARTRATE 25 MG: 25 TABLET, FILM COATED ORAL at 20:26

## 2022-07-31 RX ADMIN — LEVALBUTEROL HYDROCHLORIDE 1.25 MG: 1.25 SOLUTION RESPIRATORY (INHALATION) at 19:50

## 2022-07-31 RX ADMIN — LEVALBUTEROL HYDROCHLORIDE 1.25 MG: 1.25 SOLUTION RESPIRATORY (INHALATION) at 08:05

## 2022-07-31 RX ADMIN — HEPARIN SODIUM 800 UNITS/HR: 10000 INJECTION, SOLUTION INTRAVENOUS at 06:54

## 2022-07-31 RX ADMIN — PREDNISONE 12.5 MG: 2.5 TABLET ORAL at 09:31

## 2022-07-31 RX ADMIN — Medication 1 PACKET: at 12:40

## 2022-07-31 RX ADMIN — OXYCODONE HYDROCHLORIDE 10 MG: 5 TABLET ORAL at 20:25

## 2022-07-31 RX ADMIN — OXYCODONE HYDROCHLORIDE 10 MG: 5 TABLET ORAL at 01:14

## 2022-07-31 RX ADMIN — ACETYLCYSTEINE 2 ML: 200 INHALANT RESPIRATORY (INHALATION) at 08:05

## 2022-07-31 RX ADMIN — ONDANSETRON 4 MG: 4 TABLET, ORALLY DISINTEGRATING ORAL at 09:29

## 2022-07-31 RX ADMIN — Medication 40 MG: at 20:26

## 2022-07-31 RX ADMIN — ACETYLCYSTEINE 2 ML: 200 INHALANT RESPIRATORY (INHALATION) at 14:01

## 2022-07-31 RX ADMIN — ACETAMINOPHEN 975 MG: 325 TABLET, FILM COATED ORAL at 20:26

## 2022-07-31 RX ADMIN — ANTACID TABLETS 500 MG: 500 TABLET, CHEWABLE ORAL at 09:30

## 2022-07-31 RX ADMIN — LEVALBUTEROL HYDROCHLORIDE 1.25 MG: 1.25 SOLUTION RESPIRATORY (INHALATION) at 14:02

## 2022-07-31 RX ADMIN — METOPROLOL TARTRATE 25 MG: 25 TABLET, FILM COATED ORAL at 09:31

## 2022-07-31 RX ADMIN — ACETAMINOPHEN 975 MG: 325 TABLET, FILM COATED ORAL at 15:10

## 2022-07-31 RX ADMIN — MYCOPHENOLATE MOFETIL 500 MG: 200 POWDER, FOR SUSPENSION ORAL at 09:30

## 2022-07-31 RX ADMIN — ACETAMINOPHEN 975 MG: 325 TABLET, FILM COATED ORAL at 09:31

## 2022-07-31 RX ADMIN — OXYCODONE HYDROCHLORIDE 10 MG: 5 TABLET ORAL at 06:55

## 2022-07-31 RX ADMIN — NYSTATIN 1000000 UNITS: 100000 SUSPENSION ORAL at 09:48

## 2022-07-31 RX ADMIN — THERA TABS 1 TABLET: TAB at 11:04

## 2022-07-31 RX ADMIN — TACROLIMUS 2 MG: 5 CAPSULE ORAL at 18:07

## 2022-07-31 ASSESSMENT — ACTIVITIES OF DAILY LIVING (ADL)
ADLS_ACUITY_SCORE: 30

## 2022-07-31 NOTE — PROGRESS NOTES
"  Brief CVTS Progress Note  07/31/2022     Sofie Rodriguez is a 62 year old female with PMH significant for oxygen-dependent COPD, HF, HTN, HCV, osteopenia, and methamphetamine abuse in remission.  She is now s/p BSLT by Dr. Sunshine on 6/28/2022.  Her post-operative course has been complicated by LUE critical limb ischemia now s/p left radial thrombectomy on 7/1/2022, hypercapneic respiratory insufficiency, BACILIO, and dysphagia.     A/P:  S/p BLST on 6/28/2022, post op course c/b LUE critical limb ischemia now s/p left radial thrombectomy on 7/1/2022, hypercapneic respiratory insufficiency, BACILIO, and dysphagia.    -Hgb<7 yesterday, 8 today s/p one unit pRBCs     - Chest tube output past 24h from the right 110 ml and less than that from the left, but the left apical tube may be helping keep the left lung up and the right tube has been draining some. She is not tolerating diuresis either. So I will leave both tubes today  - Agree with continued diuresis and monitoring pleural effusion.   - IR-placed left apical pigtail chest tube on 7/25; management per Pulmonology  - Per surgeon, lungs did not fill chest space  - Daily wound care per nursing:  Wound cleanser to clamshell incision, pat dry, may be left open to air; keep incision C/D/I  - Remainder of plan per Pulm Transplant/Medicine teams     CVTS will continue to follow for surgical chest tube and clamshell incision management.       RUFUS Gonzalez, ACN-AG, CCRN  Nurse Practitioner  Cardiothoracic Surgery  Pager: [phone]         Interval History:      On the commode most of the morning          Physical Exam:     Vitals: Blood Pressure 130/76 (BP Location: Right arm)   Pulse 98   Temperature 98.3  F (36.8  C) (Oral)   Respiration 8   Height 1.575 m (5' 2\")   Weight 75.1 kg (165 lb 9.1 oz)   Oxygen Saturation 98%   Body Mass Index 30.28 kg/m    BMI= Body mass index is 30.28 kg/m .    Date 07/30/22 0700 - 07/31/22 0659   Shift [id number] " 24 Hour Total   INTAKE   NG/   150   Enteral 130   130   Blood Components 230   230   Shift Total(mL/kg) 510(6.79)   510(6.79)   OUTPUT   Urine 250   250   Chest Tube(mL/kg) 30(0.4)   30(0.4)   Shift Total(mL/kg) 280(3.73)   280(3.73)   Weight (kg) 75.1 75.1 75.1 75.1        Gen: A&Ox4, NAD  Neuro: no focal deficits   CV: NSR on tele.   Pulm: Breathing regular and non-labored            Data:    Imaging:  CXR reviewed    CBC RESULTS: Recent Labs   Lab Test 07/30/22  0350   WBC 5.9   RBC 2.08*   HGB 6.7*   HCT 22.4*   *   MCH 32.2   MCHC 29.9*   RDW 16.3*        Last Comprehensive Metabolic Panel:  Sodium   Date Value Ref Range Status   07/31/2022 138 136 - 145 mmol/L Final   06/30/2021 138 133 - 144 mmol/L Final     Potassium   Date Value Ref Range Status   07/31/2022 4.5 3.4 - 5.3 mmol/L Final   07/06/2022 5.2 3.4 - 5.3 mmol/L Final   06/30/2021 4.4 3.4 - 5.3 mmol/L Final     Chloride   Date Value Ref Range Status   07/31/2022 89 (L) 98 - 107 mmol/L Final   04/29/2022 95 94 - 109 mmol/L Final   06/30/2021 100 94 - 109 mmol/L Final     Carbon Dioxide   Date Value Ref Range Status   06/30/2021 38 (H) 20 - 32 mmol/L Final     Carbon Dioxide (CO2)   Date Value Ref Range Status   07/31/2022 46 (HH) 22 - 29 mmol/L Final   04/29/2022 42 (H) 20 - 32 mmol/L Final     Anion Gap   Date Value Ref Range Status   07/31/2022 3 (L) 7 - 15 mmol/L Final   04/29/2022 3 3 - 14 mmol/L Final   06/30/2021 <1 (L) 3 - 14 mmol/L Final     Glucose   Date Value Ref Range Status   04/29/2022 112 (H) 70 - 99 mg/dL Final   06/30/2021 117 (H) 70 - 99 mg/dL Final     GLUCOSE BY METER POCT   Date Value Ref Range Status   07/31/2022 106 (H) 70 - 99 mg/dL Final     Urea Nitrogen   Date Value Ref Range Status   07/31/2022 88.3 (H) 8.0 - 23.0 mg/dL Final   04/29/2022 21 7 - 30 mg/dL Final   06/30/2021 30 7 - 30 mg/dL Final     Creatinine   Date Value Ref Range Status   07/31/2022 1.86 (H) 0.51 - 0.95 mg/dL Final   06/30/2021 0.85  0.52 - 1.04 mg/dL Final     GFR Estimate   Date Value Ref Range Status   07/31/2022 30 (L) >60 mL/min/1.73m2 Final     Comment:     Effective December 21, 2021 eGFRcr in adults is calculated using the 2021 CKD-EPI creatinine equation which includes age and gender (Deejay peace al., NEJ, DOI: 10.1056/AEHKkn9895055)   06/30/2021 75 >60 mL/min/[1.73_m2] Final     Comment:     Non  GFR Calc  Starting 12/18/2018, serum creatinine based estimated GFR (eGFR) will be   calculated using the Chronic Kidney Disease Epidemiology Collaboration   (CKD-EPI) equation.       Calcium   Date Value Ref Range Status   07/31/2022 8.9 8.8 - 10.2 mg/dL Final   06/30/2021 9.6 8.5 - 10.1 mg/dL Final

## 2022-07-31 NOTE — CONSULTS
Nephrology Initial Consult  July 31, 2022      Sofie Rodriguez MRN:[id number] YOB: 1960  Date of Admission:6/28/2022  Primary care provider: Vinny Berrios  Requesting physician: Catalino Pantoja MD    ASSESSMENT AND RECOMMENDATIONS:     62 year old female with severe COPD, s/p BSLT on 6/28/22, developed post-op BACILIO requiring iHD from 7/14 - 7/16. Subsequently had improvement in renal function and dialysis catheter removed.     1. Non oliguric BACILIO - etiology likely secondary to supra therapeutic Tacrolimus levels.   2. Recent BACILIO Stage 3 (post-op) requiring HD from 7/14-7/16  3. S/p BSLT on 6/28/22    Plan:  - Check urinalysis, with reflex to microscopy. Clinically appears hypo/euvolemic. Can consider 500 ml NS bolus if noted to have low blood pressures  - Adjust Tacrolimus dose for target level.   - Monitor renal panel daily  - Strict intake/output and daily weight monitoring  - Renally dose all medications.     Recommendations were communicated to primary team via note    Seen and discussed with Dr. Randi Vela MD   Division of Renal Disease and Hypertension  [facility]  [email or web address]  Vocera Web Console      REASON FOR CONSULT: BACILIO    HISTORY OF PRESENT ILLNESS:  Admitting provider and nursing notes reviewed    Sofie Rodriguez is a 61 yo female with hx of HTN, Hep C, osetopenia, previous polysubstance use (stopped 2019) and severe COPD who is s/p BSLT on 6/28/2022. She developed BACILIO post-op which was felt to be related hypoperfusion with ~2.5h of bypass time, supra therapeutic vancomycin levels and tacrolimus use. She required dialysis and was initiated on 7/14, subsequently renal function improved and last HD session on 7/16    PAST MEDICAL HISTORY:  Reviewed with patient on 07/31/2022     Past Medical History:   Diagnosis Date     CHF (congestive heart failure) (H)      COPD (chronic obstructive pulmonary disease) (H)      Hepatitis 2017    Hep C, [facility]     HTN (hypertension)       Osteopenia        Past Surgical History:   Procedure Laterality Date     BRONCHOSCOPY FLEXIBLE AND RIGID N/A 07/19/2022    Procedure: BRONCHOSCOPY inspection only;  Surgeon: Bob Liao MD;  Location:  GI     COLONOSCOPY  2015     CV CORONARY ANGIOGRAM N/A 06/30/2021    Procedure: CV CORONARY ANGIOGRAM;  Surgeon: Alexander Cuellar MD;  Location:  HEART CARDIAC CATH LAB     CV RIGHT HEART CATH MEASUREMENTS RECORDED N/A 06/30/2021    Procedure: CV RIGHT HEART CATH;  Surgeon: Alexander Cuellar MD;  Location:  HEART CARDIAC CATH LAB     ENT SURGERY  1974    tonsillectomy     ESOPHAGOGASTRODUODENOSCOPY, WITH NASOGASTRIC TUBE INSERTION N/A 07/01/2022    Procedure: ESOPHAGOGASTRODUODENOSCOPY, WITH NASOJEJUNAL TUBE INSERTION;  Surgeon: Ozzy Nickerson MD;  Location:  GI     HAND SURGERY       LEEP TX, CERVICAL  04/07/2017    HECTOR III     LYMPH NODE BIOPSY Left 2005    Left axilla, benign- [place]     MIDLINE INSERTION - DOUBLE LUMEN Left 07/28/2022    20cm, Basilic vein     THROMBECTOMY UPPER EXTREMITY Left 07/02/2022    Procedure: LEFT RADIAL ARM THROMBECTOMY;  Surgeon: Christie Graham MD;  Location: [facility]     TRANSPLANT LUNG RECIPIENT SINGLE X2 Bilateral 06/28/2022    Procedure: Clamshell Incision, Bilateral Sequential Lung Transplant, On Cardiopulmonary Bypass, Flexible Bronchoscopy;  Surgeon: Sue Sunshine MD;  Location[facility]        MEDICATIONS:  PTA Meds  Prior to Admission medications    Medication Sig Last Dose Taking? Auth Provider Long Term End Date   albuterol (PROAIR HFA/PROVENTIL HFA/VENTOLIN HFA) 108 (90 BASE) MCG/ACT Inhaler Inhale 2 puffs into the lungs every 6 hours as needed for shortness of breath / dyspnea or wheezing 6/26/2022 Yes Reported, Patient Yes    aspirin (ASA) 81 MG chewable tablet Take 81 mg by mouth daily 6/28/2022 Yes Unknown, Entered By History     CALCIUM-VITAMIN D PO Take 1 tablet by mouth daily 6/28/2022 at 0930 Yes Unknown, Entered By History      fluticasone-vilanterol (BREO ELLIPTA) 100-25 MCG/INH inhaler Inhale 1 puff into the lungs daily 25 mcg 6/28/2022 at 0930 Yes Reported, Patient     furosemide (LASIX) 20 MG tablet Take 1 tablet (20 mg) by mouth daily 6/28/2022 at 0930 Yes Claribel Franks MD Yes    ipratropium - albuterol 0.5 mg/2.5 mg/3 mL (DUONEB) 0.5-2.5 (3) MG/3ML neb solution Inhale 1 vial into the lungs every 4 hours as needed for shortness of breath / dyspnea 6/27/2022 Yes Unknown, Entered By History Yes    Multiple Vitamin (QUINTABS) TABS Take 1 tablet by mouth daily 6/28/2022 at 0930 Yes Reported, Patient     umeclidinium (INCRUSE ELLIPTA) 62.5 MCG/INH inhaler Inhale 1 puff into the lungs daily 62.5mcg 6/28/2022 at 0930 Yes Reported, Patient        Current Meds    acetaminophen  975 mg Oral or Feeding Tube TID     acetylcysteine  2 mL Nebulization TID     [Held by provider] aspirin  81 mg Oral Daily     calcium carbonate 600 mg-vitamin D 400 units  1 tablet Per Feeding Tube BID w/meals     dapsone  50 mg Oral or Feeding Tube Once per day on Mon Wed Fri     insulin aspart  1-12 Units Subcutaneous Q4H     insulin glargine  13 Units Subcutaneous BID     levalbuterol  1.25 mg Nebulization TID     metoprolol tartrate  25 mg Per Feeding Tube BID     multivitamin, therapeutic  1 tablet Per Feeding Tube Daily     mycophenolate  500 mg Oral or Feeding Tube BID     nystatin   Topical BID     nystatin  1,000,000 Units Swish & Swallow 4x Daily     ondansetron  4 mg Oral Daily     pantoprazole  40 mg Oral or Feeding Tube BID     predniSONE  12.5 mg Oral or Feeding Tube BID     protein modular  1 packet Per Feeding Tube Daily     sodium chloride (PF)  10 mL Intracatheter Q8H     sodium chloride (PF)  10 mL Intracatheter Q8H     sodium chloride (PF)  3 mL Intracatheter Q8H     [START ON 8/1/2022] tacrolimus  2 mg Per G Tube QAM    And     tacrolimus  2 mg Per G Tube QPM     thiamine  100 mg Oral or Feeding Tube Daily     valGANciclovir  450 mg Oral or  Feeding Tube Once per day on      Infusion Meds    dextrose Stopped (07/15/22 1801)     heparin 800 Units/hr (22 0654)       ALLERGIES:    Allergies   Allergen Reactions     Blood Transfusion Related (Informational Only) Other (See Comments)     Patient has a history of a clinically significant antibody against RBC antigens.  A delay in compatible RBCs may occur.        REVIEW OF SYSTEMS:  A comprehensive of systems was negative except as noted above.    SOCIAL HISTORY:   Social History     Socioeconomic History     Marital status: Single     Spouse name: Not on file     Number of children: Not on file     Years of education: Not on file     Highest education level: Not on file   Occupational History     Not on file   Tobacco Use     Smoking status: Former Smoker     Years: 30.00     Types: Cigarettes     Quit date: 2020     Years since quittin.7     Smokeless tobacco: Never Used   Substance and Sexual Activity     Alcohol use: Not Currently     Drug use: Not Currently     Types: Marijuana, Methamphetamines     Comment: hx:marijuana and methamphetamine-quit both unsure ?  2-3 yrs ago     Sexual activity: Not on file   Other Topics Concern     Parent/sibling w/ CABG, MI or angioplasty before 65F 55M? Not Asked   Social History Narrative     Not on file     Social Determinants of Health     Financial Resource Strain: Not on file   Food Insecurity: Not on file   Transportation Needs: Not on file   Physical Activity: Not on file   Stress: Not on file   Social Connections: Not on file   Intimate Partner Violence: Not on file   Housing Stability: Not on file     Reviewed with patient       FAMILY MEDICAL HISTORY:   History reviewed. No pertinent family history.      PHYSICAL EXAM:   Temp  Av.6  F (37  C)  Min: 91.6  F (33.1  C)  Max: 100.8  F (38.2  C)  Arterial Line BP  Min:   Max: 263/71  Arterial Line MAP (mmHg)  Av.8 mmHg  Min: 22 mmHg  Max: 200 mmHg      Pulse  Av.3   "Min: 66  Max: 197 Resp  Av.9  Min: 0  Max: 46  FiO2 (%)  Av.3 %  Min: 21 %  Max: 100 %  SpO2  Av.7 %  Min: 82 %  Max: 100 %    CVP (mmHg): 7 mmHg  /76 (BP Location: Right arm)   Pulse 98   Temp 98.3  F (36.8  C) (Oral)   Resp 8   Ht 1.575 m (5' 2\")   Wt 75.1 kg (165 lb 9.1 oz)   SpO2 98%   BMI 30.28 kg/m     Date 22 07 - 22 0659   Shift 6324-2368 6959-3411 5103-0204 24 Hour Total   INTAKE   NG/   130   Enteral 140   140   Shift Total(mL/kg) 270(3.6)   270(3.6)   OUTPUT   Chest Tube(mL/kg) 80(1.07)   80(1.07)   Shift Total(mL/kg) 80(1.07)   80(1.07)   Weight (kg) 75.1 75.1 75.1 75.1      Admit Weight: 144.9 kg (319 lb 7.1 oz)     GENERAL APPEARANCE: not in acute distress, alert and awake  EYES: no scleral icterus, pupils equal  Lymphatics: no cervical or supraclavicular LAD  Pulmonary: lungs clear to auscultation with equal breath sounds bilaterally, no clubbing  CV: regular rhythm, normal rate, no rub   - JVD not distended   - Edema absent  GI: soft, nontender, normal bowel sounds  MS: no evidence of inflammation in joints, no muscle tenderness  : no montgomery  SKIN: no rash, warm, dry, no cyanosis  NEURO: face symmetric, no asterixis     LABS:   I have reviewed the following labs:  CMP  Recent Labs   Lab 22  1243 22  0942 22  0622 22  0320 22  0838 22  0350 22  0818 22  0446 22  1049 22  0925 22  0801 22  0646 22  0751 22  0648   NA  --   --  138  --   --  142  --  145  --  144  --  143   < > 143   POTASSIUM  --   --  4.5  --   --  4.5  --  4.5  --  4.1  --  4.9   < > 4.6   CHLORIDE  --   --  89*  --   --  91*  --  91*  --  90*  --  90*   < > 93*   CO2  --   --  46*  --   --  49*  --  48*  --  >50*  --  45*   < > 47*   ANIONGAP  --   --  3*  --   --  2*  --  6*  --   --   --  8   < > 3*   * 114* 152* 123*   < > 185*   < > 165*   < > 120*   < > 85   < > 172*   BUN  --   --  88.3*  " --   --  84.2*  --  86.8*  --  88.1*  --  84.6*   < > 91.1*   CR  --   --  1.86*  --   --  1.61*  --  1.50*  --  1.44*  --  1.35*   < > 1.20*   GFRESTIMATED  --   --  30*  --   --  36*  --  39*  --  41*  --  45*   < > 52*   ESTUARDO  --   --  8.9  --   --  9.1  --  9.2  --  9.6  --  9.7   < > 9.5   MAG  --   --  2.7*  --   --  2.6*  --  2.5*  --  2.5*  --  2.4*   < > 2.4*   PHOS  --   --  5.2*  --   --  4.8*  --  4.3  --  3.8  --  4.0   < > 4.0   PROTTOTAL  --   --   --   --   --   --   --   --   --  5.2*  --   --   --  4.7*   ALBUMIN  --   --   --   --   --   --   --   --   --  3.0*  --   --   --  2.6*   BILITOTAL  --   --   --   --   --   --   --   --   --  0.2  --   --   --  0.2   ALKPHOS  --   --   --   --   --   --   --   --   --  60  --   --   --  61   AST  --   --   --   --   --   --   --   --   --  17  --   --   --  19   ALT  --   --   --   --   --   --   --   --   --  12  --   --   --  15    < > = values in this interval not displayed.     CBC  Recent Labs   Lab 07/31/22  0622 07/30/22  0350 07/29/22  0446 07/28/22  0925   HGB 8.0* 6.7* 7.0* 8.7*   WBC 6.3 5.9 4.9 3.9*   RBC 2.61* 2.08* 2.19* 2.70*   HCT 26.7* 22.4* 23.8* 29.6*   * 108* 109* 110*   MCH 30.7 32.2 32.0 32.2   MCHC 30.0* 29.9* 29.4* 29.4*   RDW 19.1* 16.3* 16.3* 16.9*    244 265 240     INR  Recent Labs   Lab 07/27/22  0646   INR 0.87     ABG  Recent Labs   Lab 07/31/22  0622 07/30/22  0350 07/29/22  0453 07/28/22  1523   O2PER 28 30 30 23      URINE STUDIES  Recent Labs   Lab Test 07/08/22  0831 07/05/22  1004 06/28/22  1309 06/14/21  0939   COLOR Yellow Yellow Straw Yellow   APPEARANCE Clear Slightly Cloudy* Clear Slightly Cloudy   URINEGLC Negative Negative Negative Negative   URINEBILI Negative Negative Negative Negative   URINEKETONE Negative Trace* Negative Negative   SG 1.016 1.030 1.004 1.018   UBLD Small* Small* Negative Negative   URINEPH 5.5 5.5 5.0 5.0   PROTEIN 30 * 100 * Negative Negative   NITRITE Negative Negative  Negative Negative   LEUKEST Negative Negative Negative Trace*   RBCU <1 15* 0 <1   WBCU 1 5 1 8*     No lab results found.  PTH  No lab results found.  IRON STUDIES  Recent Labs   Lab Test 07/21/22  0122   IRON 31*      IRONSAT 11*   CARLOS 189       IMAGING:  All imaging studies reviewed by me.     Jeffry Vela MD

## 2022-07-31 NOTE — PLAN OF CARE
Neuro: A&Ox4.   Cardiac: SR. VSS.   Respiratory: Requirining NC 1 LPM. If pt is awake, pt maintains sats >90%, if pt falls asleep-- pt desats to 75%. Approx 3 times-- I had to go into her room to wake her up to have her deep breathe to bring her sats up. She recovered quickly when woken up. Pt does not desat when awake on RA. Pt states she had to have a sleep study before the transplant and she does not know the results of the study.   GI/: Adequate urine output.   Diet/appetite: Had jello today w/ no complications of cramping. She will try broth for dinner.   Activity:  Assist of 1, up to chair.   Pain: At acceptable level on current regimen.   Skin: No new deficits noted.    Plan: Continue with POC. Notify primary team with changes.

## 2022-07-31 NOTE — PROGRESS NOTES
Pulmonary Medicine  Cystic Fibrosis - Lung Transplant Team  Progress Note  2022       Patient: Sofie Rodriguez  MRN: [id number]  : 1960 (age 62 year old)  Transplant: 2022 (Lung), POD#33  Admission date: 2022    Assessment & Plan:     Sofie Rodriguez is a 62 year old female with a PMH significant for end-stage COPD, HTN, HFpEF, Mycobacterium peregrinum colonization, h/o hepatitis C, HECTOR s/p LEEP procedure, osteopenia, and former methamphetamine use.  Pt. is now s/p BSLT on 22, lungs slightly undersized.   Persistent low dose pressor needs post-op through 7/10.  Extubated POD #2 but with persistent hypercapnia, mostly compensated and only slightly improved with intermittent BiPAP.  Also with left radial artery thrombus (presumed secondary to arterial line) s/p thrombectomy /, BACILIO, and C diff.  Rehabilitation and airway clearance initially limited by dysrhythmia and gastric discomfort, now with gradual improvement.  Remains on 0.5-2L oxymask (SpO2 high 80s on RA) during the day and BiPAP overnight.  S/p GJ tube placement in IR .      Today's recommendations:  - Tacro 11 hour level 22.5 on , 22 hour level 15 this morning, will restart at 2 mg bid starting  pm. ?better absorption through GJ   - Diuresis per primary team  - IVIG received , recheck around  (not ordered)  - Okay to leave on room air and see if she can self recover within 5 minutes, needs 1-2L with sleep  - Trialing off bipap tonight again (no bipap last night and no change in VBG)  - Okay for small amounts of food, but advised to stop if bloating and early satiety  - Tacrolimus level pending, will adjust for you  - DSA, EBV, and donor risk labs () pending  - Decreased nebs and chest physiotherapy to TID  - continue BiPAP overnight/with naps, VBG ordered every morning  - Following pending pleural cultures, right chest tube to remain (followed by CVTS), left chest tube to remain to help keep lung  approximated to chest wall (placed by IR), consider removal of right chest tube 8/1  - CXR daily as below  - Repeat inspection bronch scheduled 8/2 at 11am, NPO at 0500  - Prednisone taper next due 8/4 (not yet ordered)  - Continuing dapsone at intermittent dosing given slow anemia, suspect phlebotomized  - Defer transition to DOAC at this time given chest tubes     S/p bilateral sequential lung transplant (BSLT) for end stage COPD:  Persistent hypercapneic respiratory failure:   Persistent hypotension, Resolved:   Bilateral hydroPTX:  Right hemidiaphragm palsy: Explant pathology with severe emphysema with subpleural bullae formation, changes of chronic bronchitis, subpleural scars and patchy pulmonary edema; benign hilar lymph nodes.  No evidence of PGD post-op.  Extubated 6/30.  Persistent hypercapnia without dyspnea, appears to be a respiratory drive problem.  Persistent low dose pressors weaned off 7/10 and scheduled midodrine topped 7/19.  SNIFF (7/14) notable for right hemidiaphragm palsy.  Chest CT (7/18) with bilateral biapical effusions R>L and improved LLL atelectasis, also with LLL hydroPTX and bilateral PTX; bilateral chest tubes not communicating well with loculated effusion areas.  Bronch (7/19) with slight graying of mucosa noted at right anastomosis and scant secretions (slightly increased in RLL).  Left surgical chest tube removed 7/20.  Chest CT (7/23) with increased loculated fluid within the left hemithorax with specks of air density in the loculated fluid, similar appearing loculated right hydroPTX with minimal decrease in fluid component (right chest tube appears to be outside the loculated hydroPTX), increased size of pleural based lesion in MARLON, and mild subcutaneous emphysema along right chest tube with minimal along tract of removed left chest tube.  S/p left apical chest tube placed by IR 7/25, exudative.  On 0.5-2L oxymask while awake (SpO2 high 80s on RA), BiPAP overnight (ongoing  hypercapnia).  - DSA one month post-transplant (7/28, pending)  - Ammonia monitoring twice weekly (screening for hyperammonemia post-lung transplant)  - Nebs: levalbuterol and Mucomyst TID  - Pulmonary toilet with chest physiotherapy TID  - Aerobika and incentive spirometry hourly while awake  - Supplemental oxygen as needed to maintain SpO2 >92% (wean as able), off BiPap overnight 7/30 without increase in CO2, will trial off again tonight with VBG every morning (ordered)  - Right chest tube (managed by surgical team) and left apical chest tube (placed by IR), remain with low-moderate output  - CXR daily while chest tubes remain  - Chest CT without contrast 7/28 unlikely to be able to place pigtail or thora the effusion remaining, will attempt diuresis once GT output decreases and met alkalosis improves  - Volume management per primary team  - Repeat inspection bronch next week (to evaluate anastomoses), scheduled 8/2 at 11am, NPO at 0500  - Encourage activity including up to the chair and PT/OT  - Pain management per primary team     Immunosuppression:  Induction therapy with basiliximab (and high dose IV steroid).  - Tacrolimus 2 mg BID.  Goal level 8-12.  Level 7/30, 30.5, Tacro 11 hour level 22.5 on 7/30, 22 hour level 15 this morning, will restart at 2 mg bid starting 7/31 pm. Suspect better absorption through GJ as a result for rapid increase  -  mg BID (decreased 7/27, GI symptoms/leukopenia), will transition back to PO Myfortic once tolerating PO medications consistently with improvement in gastroparesis   - Prednisone 12.5 mg BID, next taper due 8/4 (not yet ordered)  Date AM dose (mg) PM dose (mg)   7/28/22 12.5 12.5   8/4/22 12.5 10   8/18/22 10 10   9/15/22 10 7.5   10/13/22 7.5 7.5   11/10/22 7.5 5   12/8/22 5 5   1/5/23 5 2.5      Prophylaxis:   - Dapsone for PJP ppx (7/18), defer increasing to daily pending hgb stability (once consistently >8, revisit 8/1)  - VGCV for CMV ppx, CMV negative  7/25  - Nystatin for oral candidiasis ppx, 6 month course  - See below for serologies and viral ppx:    Donor Recipient Intervention   CMV status Positive Positive Valganciclovir POD #8-90   EBV status Positive Positive EBV check monthly (7/28, pending)   HSV status N/A Positive Not indicated      ID:  H/o M. peregrinum colonization: Donor bronch cultures (OSH) with Strep beta hemolytic (not group A).  Recipient cultures as below.  Bronch cultures (7/12) NGTD.  - AFB bronch culture (6/28, 6/29, 7/12) NGTD, AFB to be sent on all future bronchs  - Right pleural cultures (7/20) NGTD  - Left pleural cultures (7/25) NGTD     Streptococcus pneumoniae:  Stenotrophomonas maltophilia: Noted in recipient cultures at time of transplant.  S/p ceftazidime 6/28-7/10, vancomycin 7/7-7/8 and 6/28-6/30, and levofloxacin 7/10-7/12 for total 2 week ABX course.    Hypogammaglobulinemia: IgG adequate at time of transplant (1,381) and now low (336) on 7/28.  - Recommend IVIG with premedications on 7/30     H/o hepatitis C: Diagnosed in 1980s, 2 mos of treatment, quant negative since 10/2017, last positive 2/20/17 ([phone]).  Glenis positive on 6/2021 with negative HCV PCR.  H/o remote EtOH abuse.  MR elastography (4/27/21) with hepatology review and consult without any concerns post transplant.  Hepatitis C RNA negative and hepatitis C antibody positive (old) on 6/28.     PHS risk criteria donor:  Additional labs required post-transplant (between 4-8 weeks post-op): Hepatitis B, Hepatitis C, and HIV by SHERI ([id number], pending 7/28).     Other relevant problems managed by primary team:      SVT:   Aflutter with RVR: SVT first noted on 7/14, prior to HD line placement.  Continues intermittently.  Aflutter with RVR to 200 7/17 triggered by activity.  EP consulted 7/17 given persistent tachycardia/dysrhythmia.  Midodrine held 7/19.  - Metoprolol per primary team (started 7/15)  - Heparin drip (7/17), defer transition to DOAC at this time given  chest tubes     Left hand ischemia: Radial artery thrombosis identified on duplex doppler.  S/p thrombectomy on 7/2.  Completed high intensity heparin course.  Continue daily aspirin- currently held for bronch.       BACILIO:   Hyperkalemia: Likely multifactorial including medications (Bactrim, tacrolimus) and hypotension.  Fludrocortisone 7/6-7/11.  Potassium now stable.  S/p non-tunneled HD line 7/14.  Initial iHD run 7/14 limited by afib with RVR vs SVT.  Last iHD run 7/16, line removed 7/22.Cr rising to 1.5 on 7/29 with diamox and elevated tac level.   - Tacrolimus monitoring as above  - Management per nephrology and primary team     Abdominal pain: Noted 7/15, cramping pain.  ACR with large stool burden in colon, no obstruction or distention.  Some nausea but no emesis.  CT abdomen (7/15) with moderate to large gastric distention, otherwise without obstruction.  NG placed for LIS with moderate to large output for several days.  Increased abdominal pain 7/17 after clamping for 4 hours, tolerated clamping today without issue.  Gastric emptying study (7/20) with severe gastric emptying delay (95% retention at 4 hours).  S/p GJ tube placement in IR 7/27.  - Simethicone prn  - TF management per RD and primary team, G tube clamped and TF via J tube    C diff infection: Abdominal pain with diarrhea noted 7/8, AXR without dilated bowel, moderate colonic stool burden.  C diff positive 7/11.  Loose stools (on tube feeds) stable.  - PO vancomycin (7/11-7/28) per primary team, will need to revisit resuming if to begin on ABX     Hypomagnesemia: Suppressed absorption d/t CNI.   - Continue daily magnesium with replacement protocol prn, schedule oral magnesium supplementation if needed pending improvement in stools    Anemia: Suspect related to blood draws. Stable on 7/31.   - s/p Transfusion 1U PRBCs on 7/30    We appreciate the excellent care provided by the [place] [id number] team.  Recommendations communicated via in person  "rounding and this note.  Will continue to follow along closely, please do not hesitate to call with any questions or concerns.    Claribel Franks MD  Pulmonary Transplant/CF Attending  Pager: [phone]    Subjective & Interval History:   Pain better controlled, only 1 loose stool this morning and slept very well last night off bipap. No headaches or fatigue and PCO2 remained stable. She denies any nausea/acid reflux or early satiety. Did have her 1 piece of nino yesterday and did not find it to be as satisyfing as she was hoping so trialing orange jello today. Breathing is still shallow, off oxygen at rest and SPO2 generally stays above 90% and improves with deep breathing, she falls asleep off oxygen and drops to mid 70s. Still having discomfort from chest tubes but is fearful of them coming back out b/c she doesn't want to have them replaced. Feels like peripheral edema is improving.     Review of Systems:     C: No fever, no chills, + increased weight  INTEGUMENTARY/SKIN: No rash or obvious new lesions  ENT/MOUTH: No sore throat, no sinus pain, no nasal congestion or drainage  RESP: See interval history  CV: chest pain improved, no palpitations, + peripheral edema  GI: See interval history  : No dysuria  MUSCULOSKELETAL: See interval history  ENDOCRINE: Blood sugars with adequate control  NEURO: No headache, no numbness or tingling  PSYCHIATRIC: Mood stable interactive and better animation this morning    Physical Exam:     All notes, images, and labs from past 24 hours (at minimum) were reviewed.    Vital signs:  Temp: 98.4  F (36.9  C) Temp src: Oral BP: 116/62 Pulse: 82   Resp: 13 SpO2: 99 % O2 Device: Nasal cannula Oxygen Delivery: 1 LPM Height: 157.5 cm (5' 2\") Weight: 75.1 kg (165 lb 9.1 oz)  I/O:   L CT 30  R     Intake/Output Summary (Last 24 hours) at 7/31/2022 1505  Last data filed at 7/31/2022 1000  Gross per 24 hour   Intake 1225.23 ml   Output 735 ml   Net 490.23 ml           Constitutional: " Lying in bed, in no apparent distress.   HEENT: Eyes with pink conjunctivae, anicteric.  Oral mucosa moist without lesions.    PULM: Diminished air flow t/o left and to RLL.  No crackles, no rhonchi, no wheezes.  Non-labored breathing on 1L NC.  Bilateral chest tubes without air leak.  CV: Normal S1 and S2.  Tachycardic.  No murmur, gallop, or rub.  Trace BLE edema.   ABD: NABS, soft, + tender t/o, nondistended.    MSK: Moves all extremities.  + muscle wasting.   NEURO: Alert, conversant.   SKIN: Warm, dry.  No rash on limited exam.  Clamshell incision not visualized.   PSYCH: Mood stable.    Lines, Drains, and Devices:  Peripheral IV 07/20/22 Anterior;Right Lower forearm (Active)   Site Assessment WDL 07/28/22 0400   Line Status Infusing 07/28/22 0400   Dressing Intervention New dressing  07/20/22 1548   Phlebitis Scale 0-->no symptoms 07/28/22 0400   Infiltration Scale 0 07/28/22 0400   Number of days: 8     Data:     LABS    CMP:   Recent Labs   Lab 07/31/22  0622 07/31/22  0320 07/31/22  0058 07/30/22  2123 07/30/22  0838 07/30/22  0350 07/29/22  0818 07/29/22  0446 07/28/22  1049 07/28/22  0925 07/27/22  0801 07/27/22  0646 07/25/22  0751 07/25/22  0648     --   --   --   --  142  --  145  --  144  --  143   < > 143   POTASSIUM 4.5  --   --   --   --  4.5  --  4.5  --  4.1  --  4.9   < > 4.6   CHLORIDE 89*  --   --   --   --  91*  --  91*  --  90*  --  90*   < > 93*   CO2 46*  --   --   --   --  49*  --  48*  --  >50*  --  45*   < > 47*   ANIONGAP 3*  --   --   --   --  2*  --  6*  --   --   --  8   < > 3*   * 123* 142* 151*   < > 185*   < > 165*   < > 120*   < > 85   < > 172*   BUN 88.3*  --   --   --   --  84.2*  --  86.8*  --  88.1*  --  84.6*   < > 91.1*   CR 1.86*  --   --   --   --  1.61*  --  1.50*  --  1.44*  --  1.35*   < > 1.20*   GFRESTIMATED 30*  --   --   --   --  36*  --  39*  --  41*  --  45*   < > 52*   ESTUARDO 8.9  --   --   --   --  9.1  --  9.2  --  9.6  --  9.7   < > 9.5   MAG 2.7*   --   --   --   --  2.6*  --  2.5*  --  2.5*  --  2.4*   < > 2.4*   PHOS 5.2*  --   --   --   --  4.8*  --  4.3  --  3.8  --  4.0   < > 4.0   PROTTOTAL  --   --   --   --   --   --   --   --   --  5.2*  --   --   --  4.7*   ALBUMIN  --   --   --   --   --   --   --   --   --  3.0*  --   --   --  2.6*   BILITOTAL  --   --   --   --   --   --   --   --   --  0.2  --   --   --  0.2   ALKPHOS  --   --   --   --   --   --   --   --   --  60  --   --   --  61   AST  --   --   --   --   --   --   --   --   --  17  --   --   --  19   ALT  --   --   --   --   --   --   --   --   --  12  --   --   --  15    < > = values in this interval not displayed.     CBC:   Recent Labs   Lab 07/31/22  0622 07/30/22  0350 07/29/22  0446 07/28/22  0925   WBC 6.3 5.9 4.9 3.9*   RBC 2.61* 2.08* 2.19* 2.70*   HGB 8.0* 6.7* 7.0* 8.7*   HCT 26.7* 22.4* 23.8* 29.6*   * 108* 109* 110*   MCH 30.7 32.2 32.0 32.2   MCHC 30.0* 29.9* 29.4* 29.4*   RDW 19.1* 16.3* 16.3* 16.9*    244 265 240       INR:   Recent Labs   Lab 07/27/22  0646   INR 0.87       Glucose:   Recent Labs   Lab 07/31/22  0622 07/31/22  0320 07/31/22  0058 07/30/22  2123 07/30/22  1548 07/30/22  1203   * 123* 142* 151* 159* 114*       Blood Gas:   Recent Labs   Lab 07/31/22  0622 07/30/22  0350 07/29/22  0453   PHV 7.40 7.43 7.43   PCO2V 78* 76* 78*   PO2V 36 43 37   HCO3V 48* 50* 52*   LINDY 20.3* 23.3* 24.0*   O2PER 28 30 30       Culture Data No results for input(s): CULT in the last 168 hours.    Virology Data:   Lab Results   Component Value Date    FLUAH1 Not Detected 06/29/2022    FLUAH3 Not Detected 06/29/2022    NL4667 Not Detected 06/29/2022    IFLUB Not Detected 06/29/2022    RSVA Not Detected 06/29/2022    RSVB Not Detected 06/29/2022    PIV1 Not Detected 06/29/2022    PIV2 Not Detected 06/29/2022    PIV3 Not Detected 06/29/2022    HMPV Not Detected 06/29/2022       Historical CMV results (last 3 of prior testing):  Lab Results   Component Value Date     CMVQNT Not Detected 07/25/2022     No results found for: CMVLOG    Urine Studies    Recent Labs   Lab Test 07/08/22  0831 07/05/22  1004   URINEPH 5.5 5.5   NITRITE Negative Negative   LEUKEST Negative Negative   WBCU 1 5       Most Recent [facility] Pulmonary Function Testing (FVC/FEV1 only)  FVC-Pre   Date Value Ref Range Status   04/29/2022 1.82 L    11/11/2021 2.17 L    06/14/2021 2.00 L      FVC-%Pred-Pre   Date Value Ref Range Status   04/29/2022 58 %    11/11/2021 70 %    06/14/2021 64 %      FEV1-Pre   Date Value Ref Range Status   04/29/2022 0.51 L    11/11/2021 0.53 L    06/14/2021 0.54 L      FEV1-%Pred-Pre   Date Value Ref Range Status   04/29/2022 20 %    11/11/2021 21 %    06/14/2021 21 %        IMAGING    Recent Results (from the past 48 hour(s))   XR Chest 2 Views    Narrative    Chest 2 views    INDICATION: Interval follow-up, lung transplant, bilateral chest tubes    COMPARISON: Yesterday    FINDINGS: Clamshell sternotomy from prior bilateral lung transplant  again noted. Chest tubes again noted. Heart size normal. Left  perihilar opacity unchanged. Thickening in the right minor fissure  increased. Feeding tube beyond the inferior margin of the image.  Calcification at the aortic knob. No new ectopic air collections.  Continued costophrenic angle blunting and haziness in the left lower  lung.      Impression    IMPRESSION: Continued left pleural effusion and associated  atelectasis. Continued prominent perihilar opacity on the left.  Increased small pleural effusion or atelectasis on the right. Prior  bilateral lung transplantation.    ALVINA HARRY MD         SYSTEM ID:  [id number]   CT Chest w/o Contrast    Impression    RESIDENT PRELIMINARY INTERPRETATION  IMPRESSION:   1.  Small left loculated anteroinferior hydropneumothorax, increased  from prior.  2.  Stable loculated right hydropneumothorax with right basilar chest  tube that does not appear to be continuous with any fluid collections.  3.   Stable left upper lobe subpleural lesion measuring up to 2.0 cm.  Recommend close attention on follow-up.  4.  Enlarged bilateral hilar lymph nodes, likely reactive.

## 2022-07-31 NOTE — CARE PLAN
Pt c/o stomach fullness, discomfort, and cramping around approx 1800. PRN zofran given and simethicone ordered from pharmacy. Cold cloth applied and deep breathing techniques utilized. Pt laying on right side. IVIG decreased during episode, dose completed. All other goals remain in progress and maintaining.

## 2022-07-31 NOTE — PROGRESS NOTES
Pt declined CPT this evening as well as only allowed neb to run for approx 4 minutes without finishing d/t experiencing nausea and overall irritability. Pt seems anxious as well. Reported findings to BLADE Simon, LRT, BSRT-RRT

## 2022-07-31 NOTE — PLAN OF CARE
Major Shift Events: Pt intermittently nauseous at beginning of shift. Repositioned and rested with improvement. Did not wear bipap overnight, remained on 1L with sats above 90%. Pain managed adequately. Critical labs of CO2 and bicarb reported to [facility] team. Heparin therapeutic at 800 units/hr.      Plan: Bronch on 8/2 at 1100, will need to be strict NPO. Continue to closely monitor patient and notify MD's of any new changes. For vital signs and complete assessments, please see documentation flowsheets.     Problem: Hemodynamic Instability (Lung Surgery)  Goal: Effective Tissue Perfusion  Outcome: Ongoing, Progressing  Intervention: Optimal Blood Flow  Recent Flowsheet Documentation  Taken 7/31/2022 0400 by Tiffani Morgan, RN  Fluid/Electrolyte Management: fluids provided  Taken 7/31/2022 0000 by Tiffani Morgan, RN  Fluid/Electrolyte Management: fluids provided  Taken 7/30/2022 2000 by Tiffani Morgan, RN  Fluid/Electrolyte Management: fluids provided     Problem: Pain (Lung Surgery)  Goal: Acceptable Pain Control  Outcome: Ongoing, Progressing  Intervention: Prevent or Manage Pain  Recent Flowsheet Documentation  Taken 7/31/2022 0600 by Tiffani Morgan, RN  Pain Management Interventions:    medication (see MAR)    relaxation techniques promoted    quiet environment facilitated    repositioned  Taken 7/31/2022 0030 by Tiffani Morgan, RN  Pain Management Interventions:    medication (see MAR)    relaxation techniques promoted    quiet environment facilitated    repositioned  Taken 7/30/2022 2000 by Tiffani Morgan, RN  Pain Management Interventions:    medication (see MAR)    relaxation techniques promoted    quiet environment facilitated    repositioned     Problem: Postoperative Nausea and Vomiting (Lung Surgery)  Goal: Nausea and Vomiting Relief  Outcome: Ongoing, Progressing  Intervention: Prevent or Manage Nausea and Vomiting  Recent Flowsheet Documentation  Taken 7/30/2022 2000 by Tiffani Morgan, RN  Nausea/Vomiting  Interventions: slow deep breathing encouraged     Problem: Oral Intake Inadequate  Goal: Improved Oral Intake  Outcome: Ongoing, Progressing  Intervention: Promote and Optimize Oral Intake  Recent Flowsheet Documentation  Taken 7/31/2022 0400 by Tiffani Morgan, RN  Oral Nutrition Promotion: rest periods promoted  Taken 7/31/2022 0000 by Tiffani Morgan, RN  Oral Nutrition Promotion: rest periods promoted  Taken 7/30/2022 2000 by Tiffani Morgan, RN  Oral Nutrition Promotion: rest periods promoted

## 2022-07-31 NOTE — PROGRESS NOTES
[facility]    Medicine Progress Note - Hospitalist Service, GOLD TEAM 10    Date of Admission:  6/28/2022    Assessment & Plan  Sofie Rodriguez is a 62 year old female admitted on 6/28/2022. She has PMH of end stage COPD s/p b/l lung transplant on 6/28/2022, HTN, HFpEF, h/o hepC, oteopenia, and former methamphetamine use, and she was transferred to [place] [id number] [facility] from MICU on 7/15/2022. She was admitted to the MICU on 7/13/20222 with prior hospital course complicated by left upper extremity acute limb ischemia s/p left radial thrombectomy on 7/1/2022. She was primarily treated for acute hypercapnic respiratory failure on intermittent BIPAP with worsening BACILIO while in the MICU. Breathing is improving, but patient has severely delayed gastric emptying found on NM study. PEGJ placement ordered, pending supply chain issue resolution.       Today's Plan:  - 7/27: s/p IR placed PEGJ    G: CLAMPED, and [place] dosing   J for feeding and meds  - Heparin gtt to resume post PEGJ, DOAC prior to discharge  - Continue to encourage OOB and up in chair  - BIPAP at night only if patient needs it, VBG in the AM  - Follow-up pleural fluid studies for L Apical Fluid Collection, Pigtail by IR 7/25  - Trial 1 piece [place] today, per pt request   > Caution diet advancement as severe gastroparesis history  - 1 unit prbc given for Hb 6.7  - 7/30: IVIG dose  - 7/31: BACILIO and renal consult, [place] on hold  - Titrate Glargine as needed for BG trending     End stage COPD s/p BSLT 6/28/2022  Acute hypercapnic hypoxic respiratory failure (improving), likely 2/2 decreased central respiratory drive vs respiratory muscle weakness and some pulmonary edema / fluid Transplanted 6/28. formal SNIFF test was performed on 7/14 showed R hemidiaphragm palsy. Of note transplanted lungs were also considered small for body size and could contribute to decreased respiratory compensation for hypercarbia. VBG relatively  stable, most recent pCO2 74 (7/22).   - oxycodone 5-10 mg q4h PRN   - encourage activity and getting up in bed; PT/OT participation  - encourage chest physiotherapy QID  - Start to wean off Bipap at night, if pt stable    Immunosuppression:  - Prednisone 12.5 BID  - Tacrolimus 5.5mg BID (trough goal 8-12) -- dosing per Pulm Tx  -  BID  Prophylasxis:  - CMV - Valgancyclover  - Thrush - PO nysatin  - PJP - TMP-SMX (currently held given BACILIO); dapsone started 7/18 at 50mg qMWF (will try to increase to daily on 7/25 per pulm)     Pleural Effusion, Right and Left  - 1 R chest tube (basilar) in place currently (pericardial drain was removed 7/15, L basilar was removed 7/20). CT chest 7/14 showing unchanged bilateral effusions and unchanged biapical pneumothoraces with resolved left basilar pneumothorax; bibasilar chest tubes in place with pericardial drain (which was removed 7/15).   - daily CXR  - RIGHT Chest Tube - still with outputs noted  - LEFT Apical Fluid collection - IR targeting via CT guided pigtail placement     Hypotension, resolved  H/o HTN  H/o HFpEF  Likely vasoplegia post operatively. Last echo 7/7 normal EF with good LV function. S/p hydrocortisone 7/6-7/9 and fludrocortisone 7/6-7/11 without significant improvement.  - off midorine 7/19  - Holding PTA lasix 20mg daily    BACILIO-- recurrent  Hypermagnesemia  Hyperphosphatemia  Baseline renal function was normal prior to surgery. New onset renal failure after transplant, likely multifactorial due to pre-renal hypotension, less likely nephrotoxic agents. Overall kidney function improving. Today, Cr fluctuating but BUN increasing, with unclear urine output (7/22).   - HD cath removed 7/22, trend metabolites  - Adequate UOP despite Cr increase     C.diff - vanco started 7/12, course completed    Severe Gastroparesis - gastric emptying study 7/20  - PO vanc 125mg QID 7/12 to present  - gastric emptying study 7/20 showed delayed gastric emptying with  retention of 95% of stomach contents after 4 hours; please keep patient NPO except tube feeds and meds  - Simethicone scheduled  - 7/27: PEGJ     NJ tube  Feeding regimen:   - Adult Formula Drip Feeding: Continuous Novasource Renal; Nasojejunal; Goal Rate: 35; initiate at goal rate; mL/hr; Medication - Feeding Tube Flush Frequency: At least 15-30 mL water before and after medication administration and with tube clogging     Tachyarrthymia  Paroxysmal afib  Paroxysmal aflutter/AT  SVT vs afib.Had an occurrence during HD on 7/14. Had afib with RVR to 200s on 7/17. EP consult placed.asmyptomatic and stable during episodes. Aflutter episode (7/17) with transfer. Vagal maneuver responsive at time. No recent tachyarrhythmia episodes (7/22).   - Per EP: patient has had episodes of possible flutter (vs AT with 2:1 conduction) and afib with RVR  - heparin drip and transition to DOAC after PEGJ placement and chest tubes resolved (CHADS-VASc score of 2)  - On telemetry  - On metoprolol tartrate 25mg BID  - Discharge on ziopatch for 14 days with EP follow up in 1-2 months after     Stress-induced hyperglycemia  BG close to 100 last 24 hrs.   - on 15U glargine BID;>> Decrease to 13 units BID    Acute Blood Loss Anemia, stable  Initially from acute blood loss. Hb dropped to 6.8 on 7/14, requiring 1U pRBC. Post-transfusion Hb 9.4 > 8.3 > 8.6 > 8.7 (7/18).   - 7/30: s/p 1 unit prbc   - continue to monitor  - transfuse for hgb<7        Diet: Adult Formula Drip Feeding: Continuous Novasource Renal; Jejunostomy; Goal Rate: 35; mL/hr; Medication - Feeding Tube Flush Frequency: At least 15-30 mL water before and after medication administration and with tube clogging; Amount to Send (Nutri...  NPO per Anesthesia Guidelines for Procedure/Surgery Except for: No Exceptions  Regular Diet Adult    DVT Prophylaxis: heparin gtt  Dong Catheter: Not present  Central Lines: PRESENT       Cardiac Monitoring: None  Code Status: Full Code       Disposition Plan        The patient's care was discussed with the Patient, Patient's Family and Pulm Tx Consultant.    Catalino Pantoja MD  Hospitalist Service, GOLD TEAM [address] [place] [address]  Securely message with the Vocera Web Console (learn more here)  Text page via [facility] Paging/Directory   Please see signed in provider for up to date coverage information      Clinically Significant Risk Factors Present on Admission                      ______________________________________________________________________    Interval History   Seen today for follow up: Post lung transplant, pleural effusion, chest tubes    No acute overnight events per patient or patient's family.    This AM, pt states turned left and right, slept well without Bipap.  Some ongoing chest wall pain when sitting upright.  Overall, pt feels breathing improving slowly.  Denies any swelling.    As of today/at time of my visit:  Patient denies any headache, sore throat  Patient denies any sleeping disturbance  Patient denies any nausea or vomiting  Patient reports no abdominal pain  Patient denies any chest pain  Patient reports no arm or leg edema      Data reviewed today: I reviewed all medications, new labs and imaging results over the last 24 hours. I personally reviewed no images or EKG's today.    Physical Exam   Vital Signs: Temp: 98.3  F (36.8  C) Temp src: Oral BP: 130/76 Pulse: 98   Resp: 8 SpO2: 98 % O2 Device: Nasal cannula Oxygen Delivery: 1 LPM  Weight: 165 lbs 9.05 oz  General: non-distressed adult  HEENT: Normocephalic, atraumatic, pupils equal round reactive, membranes moist  CV: normal capillary refill, heart regular rate and rhythm, tele  Respiratory: Non-labored breathing, bronchovesicular lung sounds, R Chest Tube noted, draining serosangiunous. L apical chest tube in place  Abdominal: soft, mildly tender in the epigastrium, no rebound, no guarding, no rigidity, +bowel sounds, PEGJ in  place  Genitourinary: no suprapubic tenderness  Musculoskeletal: normal bulk and tone  Skin: no rash, normal turgor  Neurologic: no facial droop, moving upper and lower extremities spontaneously, sensation in tact to light touch on extremities  Psychiatric: normal mood and affect    Data   Recent Labs   Lab 07/31/22  1243 07/31/22  0942 07/31/22  0622 07/30/22  0838 07/30/22  0350 07/29/22  0818 07/29/22  0446 07/28/22  1049 07/28/22  0925 07/27/22  0801 07/27/22  0646 07/25/22  0751 07/25/22  0648   WBC  --   --  6.3  --  5.9  --  4.9  --  3.9*  --  3.1*   < > 4.0   HGB  --   --  8.0*  --  6.7*  --  7.0*  --  8.7*  --  8.3*   < > 7.3*   MCV  --   --  102*  --  108*  --  109*  --  110*  --  107*   < > 107*   PLT  --   --  252  --  244  --  265  --  240  --  235   < > 267   INR  --   --   --   --   --   --   --   --   --   --  0.87  --   --    NA  --   --  138  --  142  --  145  --  144  --  143   < > 143   POTASSIUM  --   --  4.5  --  4.5  --  4.5  --  4.1  --  4.9   < > 4.6   CHLORIDE  --   --  89*  --  91*  --  91*  --  90*  --  90*   < > 93*   CO2  --   --  46*  --  49*  --  48*  --  >50*  --  45*   < > 47*   BUN  --   --  88.3*  --  84.2*  --  86.8*  --  88.1*  --  84.6*   < > 91.1*   CR  --   --  1.86*  --  1.61*  --  1.50*  --  1.44*  --  1.35*   < > 1.20*   ANIONGAP  --   --  3*  --  2*  --  6*  --   --   --  8   < > 3*   ESTUARDO  --   --  8.9  --  9.1  --  9.2  --  9.6  --  9.7   < > 9.5   * 114* 152*   < > 185*   < > 165*   < > 120*   < > 85   < > 172*   ALBUMIN  --   --   --   --   --   --   --   --  3.0*  --   --   --  2.6*   PROTTOTAL  --   --   --   --   --   --   --   --  5.2*  --   --   --  4.7*   BILITOTAL  --   --   --   --   --   --   --   --  0.2  --   --   --  0.2   ALKPHOS  --   --   --   --   --   --   --   --  60  --   --   --  61   ALT  --   --   --   --   --   --   --   --  12  --   --   --  15   AST  --   --   --   --   --   --   --   --  17  --   --   --  19    < > = values in this  interval not displayed.

## 2022-08-01 ENCOUNTER — APPOINTMENT (OUTPATIENT)
Dept: PHYSICAL THERAPY | Facility: CLINIC | Age: 62
DRG: 007 | End: 2022-08-01
Attending: THORACIC SURGERY (CARDIOTHORACIC VASCULAR SURGERY)
Payer: MEDICARE

## 2022-08-01 ENCOUNTER — APPOINTMENT (OUTPATIENT)
Dept: GENERAL RADIOLOGY | Facility: CLINIC | Age: 62
DRG: 007 | End: 2022-08-01
Attending: PHYSICIAN ASSISTANT
Payer: MEDICARE

## 2022-08-01 LAB
ALBUMIN SERPL BCG-MCNC: 2.8 G/DL (ref 3.5–5.2)
ALBUMIN UR-MCNC: NEGATIVE MG/DL
ALP SERPL-CCNC: 68 U/L (ref 35–104)
ALT SERPL W P-5'-P-CCNC: 12 U/L (ref 10–35)
AMMONIA PLAS-SCNC: 14 UMOL/L (ref 11–51)
ANION GAP SERPL CALCULATED.3IONS-SCNC: 2 MMOL/L (ref 7–15)
APPEARANCE UR: CLEAR
AST SERPL W P-5'-P-CCNC: 22 U/L (ref 10–35)
BACTERIA PLR CULT: NORMAL
BASE EXCESS BLDV CALC-SCNC: 21.8 MMOL/L (ref -7.7–1.9)
BILIRUB DIRECT SERPL-MCNC: <0.2 MG/DL (ref 0–0.3)
BILIRUB SERPL-MCNC: 0.2 MG/DL
BILIRUB UR QL STRIP: NEGATIVE
BUN SERPL-MCNC: 80.2 MG/DL (ref 8–23)
CALCIUM SERPL-MCNC: 9 MG/DL (ref 8.8–10.2)
CHLORIDE SERPL-SCNC: 91 MMOL/L (ref 98–107)
COLOR UR AUTO: ABNORMAL
CREAT SERPL-MCNC: 1.67 MG/DL (ref 0.51–0.95)
DEPRECATED HCO3 PLAS-SCNC: 47 MMOL/L (ref 22–29)
EBV DNA COPIES/ML, INSTRUMENT: ABNORMAL COPIES/ML
EBV DNA SPEC NAA+PROBE-LOG#: 4.1 {LOG_COPIES}/ML
GFR SERPL CREATININE-BSD FRML MDRD: 35 ML/MIN/1.73M2
GLUCOSE BLDC GLUCOMTR-MCNC: 122 MG/DL (ref 70–99)
GLUCOSE BLDC GLUCOMTR-MCNC: 129 MG/DL (ref 70–99)
GLUCOSE BLDC GLUCOMTR-MCNC: 134 MG/DL (ref 70–99)
GLUCOSE BLDC GLUCOMTR-MCNC: 143 MG/DL (ref 70–99)
GLUCOSE BLDC GLUCOMTR-MCNC: 161 MG/DL (ref 70–99)
GLUCOSE BLDC GLUCOMTR-MCNC: 171 MG/DL (ref 70–99)
GLUCOSE BLDC GLUCOMTR-MCNC: 181 MG/DL (ref 70–99)
GLUCOSE SERPL-MCNC: 144 MG/DL (ref 70–99)
GLUCOSE UR STRIP-MCNC: NEGATIVE MG/DL
HBV DNA SERPL QL NAA+PROBE: NORMAL
HCO3 BLDV-SCNC: 49 MMOL/L (ref 21–28)
HCV RNA SERPL QL NAA+PROBE: NORMAL
HGB UR QL STRIP: NEGATIVE
HIV1+2 RNA SERPL QL NAA+PROBE: NORMAL
KETONES UR STRIP-MCNC: NEGATIVE MG/DL
LEUKOCYTE ESTERASE UR QL STRIP: NEGATIVE
MAGNESIUM SERPL-MCNC: 2.7 MG/DL (ref 1.7–2.3)
NITRATE UR QL: NEGATIVE
O2/TOTAL GAS SETTING VFR VENT: 35 %
PCO2 BLDV: 75 MM HG (ref 40–50)
PH BLDV: 7.42 [PH] (ref 7.32–7.43)
PH UR STRIP: 8 [PH] (ref 5–7)
PHOSPHATE SERPL-MCNC: 4.7 MG/DL (ref 2.5–4.5)
PO2 BLDV: 34 MM HG (ref 25–47)
POTASSIUM SERPL-SCNC: 4.2 MMOL/L (ref 3.4–5.3)
PROT SERPL-MCNC: 5.3 G/DL (ref 6.4–8.3)
SODIUM SERPL-SCNC: 140 MMOL/L (ref 136–145)
SODIUM UR-SCNC: 68 MMOL/L
SP GR UR STRIP: 1.01 (ref 1–1.03)
TACROLIMUS BLD-MCNC: 7.4 UG/L (ref 5–15)
TME LAST DOSE: NORMAL H
TME LAST DOSE: NORMAL H
UFH PPP CHRO-ACNC: 0.39 IU/ML
UROBILINOGEN UR STRIP-MCNC: NORMAL MG/DL

## 2022-08-01 PROCEDURE — 250N000013 HC RX MED GY IP 250 OP 250 PS 637: Performed by: HOSPITALIST

## 2022-08-01 PROCEDURE — 94640 AIRWAY INHALATION TREATMENT: CPT | Mod: 76

## 2022-08-01 PROCEDURE — 250N000009 HC RX 250: Performed by: PHYSICIAN ASSISTANT

## 2022-08-01 PROCEDURE — 250N000013 HC RX MED GY IP 250 OP 250 PS 637: Performed by: STUDENT IN AN ORGANIZED HEALTH CARE EDUCATION/TRAINING PROGRAM

## 2022-08-01 PROCEDURE — 250N000011 HC RX IP 250 OP 636

## 2022-08-01 PROCEDURE — 250N000012 HC RX MED GY IP 250 OP 636 PS 637: Performed by: PHYSICIAN ASSISTANT

## 2022-08-01 PROCEDURE — 84300 ASSAY OF URINE SODIUM: CPT | Performed by: STUDENT IN AN ORGANIZED HEALTH CARE EDUCATION/TRAINING PROGRAM

## 2022-08-01 PROCEDURE — 71046 X-RAY EXAM CHEST 2 VIEWS: CPT | Mod: 26 | Performed by: RADIOLOGY

## 2022-08-01 PROCEDURE — 84100 ASSAY OF PHOSPHORUS: CPT | Performed by: STUDENT IN AN ORGANIZED HEALTH CARE EDUCATION/TRAINING PROGRAM

## 2022-08-01 PROCEDURE — 999N000157 HC STATISTIC RCP TIME EA 10 MIN

## 2022-08-01 PROCEDURE — 99233 SBSQ HOSP IP/OBS HIGH 50: CPT | Mod: GC | Performed by: INTERNAL MEDICINE

## 2022-08-01 PROCEDURE — 82248 BILIRUBIN DIRECT: CPT | Performed by: NURSE PRACTITIONER

## 2022-08-01 PROCEDURE — 250N000013 HC RX MED GY IP 250 OP 250 PS 637: Performed by: SURGERY

## 2022-08-01 PROCEDURE — 250N000012 HC RX MED GY IP 250 OP 636 PS 637: Performed by: INTERNAL MEDICINE

## 2022-08-01 PROCEDURE — 82803 BLOOD GASES ANY COMBINATION: CPT | Performed by: STUDENT IN AN ORGANIZED HEALTH CARE EDUCATION/TRAINING PROGRAM

## 2022-08-01 PROCEDURE — 97530 THERAPEUTIC ACTIVITIES: CPT | Mod: GP

## 2022-08-01 PROCEDURE — 80197 ASSAY OF TACROLIMUS: CPT | Performed by: INTERNAL MEDICINE

## 2022-08-01 PROCEDURE — 94668 MNPJ CHEST WALL SBSQ: CPT

## 2022-08-01 PROCEDURE — 99233 SBSQ HOSP IP/OBS HIGH 50: CPT | Performed by: HOSPITALIST

## 2022-08-01 PROCEDURE — 36592 COLLECT BLOOD FROM PICC: CPT | Performed by: NURSE PRACTITIONER

## 2022-08-01 PROCEDURE — 71046 X-RAY EXAM CHEST 2 VIEWS: CPT

## 2022-08-01 PROCEDURE — 97116 GAIT TRAINING THERAPY: CPT | Mod: GP

## 2022-08-01 PROCEDURE — 99232 SBSQ HOSP IP/OBS MODERATE 35: CPT | Mod: 24 | Performed by: INTERNAL MEDICINE

## 2022-08-01 PROCEDURE — 81003 URINALYSIS AUTO W/O SCOPE: CPT | Performed by: STUDENT IN AN ORGANIZED HEALTH CARE EDUCATION/TRAINING PROGRAM

## 2022-08-01 PROCEDURE — 83735 ASSAY OF MAGNESIUM: CPT | Performed by: STUDENT IN AN ORGANIZED HEALTH CARE EDUCATION/TRAINING PROGRAM

## 2022-08-01 PROCEDURE — 94640 AIRWAY INHALATION TREATMENT: CPT

## 2022-08-01 PROCEDURE — 120N000002 HC R&B MED SURG/OB UMMC

## 2022-08-01 PROCEDURE — 250N000011 HC RX IP 250 OP 636: Performed by: STUDENT IN AN ORGANIZED HEALTH CARE EDUCATION/TRAINING PROGRAM

## 2022-08-01 PROCEDURE — 250N000013 HC RX MED GY IP 250 OP 250 PS 637: Performed by: THORACIC SURGERY (CARDIOTHORACIC VASCULAR SURGERY)

## 2022-08-01 PROCEDURE — 85520 HEPARIN ASSAY: CPT | Performed by: HOSPITALIST

## 2022-08-01 PROCEDURE — 258N000003 HC RX IP 258 OP 636: Performed by: HOSPITALIST

## 2022-08-01 PROCEDURE — 250N000013 HC RX MED GY IP 250 OP 250 PS 637: Performed by: NURSE PRACTITIONER

## 2022-08-01 PROCEDURE — 250N000012 HC RX MED GY IP 250 OP 636 PS 637: Performed by: HOSPITALIST

## 2022-08-01 PROCEDURE — 82140 ASSAY OF AMMONIA: CPT | Performed by: NURSE PRACTITIONER

## 2022-08-01 RX ORDER — ONDANSETRON 2 MG/ML
4 INJECTION INTRAMUSCULAR; INTRAVENOUS EVERY MORNING
Status: DISCONTINUED | OUTPATIENT
Start: 2022-08-02 | End: 2022-08-01

## 2022-08-01 RX ADMIN — LEVALBUTEROL HYDROCHLORIDE 1.25 MG: 1.25 SOLUTION RESPIRATORY (INHALATION) at 13:55

## 2022-08-01 RX ADMIN — DAPSONE 50 MG: 25 TABLET ORAL at 10:37

## 2022-08-01 RX ADMIN — Medication 40 MG: at 10:32

## 2022-08-01 RX ADMIN — NYSTATIN: 100000 CREAM TOPICAL at 21:04

## 2022-08-01 RX ADMIN — NYSTATIN 1000000 UNITS: 100000 SUSPENSION ORAL at 17:33

## 2022-08-01 RX ADMIN — THIAMINE HCL TAB 100 MG 100 MG: 100 TAB at 10:31

## 2022-08-01 RX ADMIN — NYSTATIN 1000000 UNITS: 100000 SUSPENSION ORAL at 14:34

## 2022-08-01 RX ADMIN — INSULIN GLARGINE 15 UNITS: 100 INJECTION, SOLUTION SUBCUTANEOUS at 21:36

## 2022-08-01 RX ADMIN — NYSTATIN 1000000 UNITS: 100000 SUSPENSION ORAL at 21:05

## 2022-08-01 RX ADMIN — METOPROLOL TARTRATE 25 MG: 25 TABLET, FILM COATED ORAL at 21:05

## 2022-08-01 RX ADMIN — TACROLIMUS 2 MG: 5 CAPSULE ORAL at 17:34

## 2022-08-01 RX ADMIN — PREDNISONE 12.5 MG: 2.5 TABLET ORAL at 21:04

## 2022-08-01 RX ADMIN — ACETYLCYSTEINE 2 ML: 200 INHALANT RESPIRATORY (INHALATION) at 13:54

## 2022-08-01 RX ADMIN — ONDANSETRON 4 MG: 4 TABLET, ORALLY DISINTEGRATING ORAL at 08:12

## 2022-08-01 RX ADMIN — LEVALBUTEROL HYDROCHLORIDE 1.25 MG: 1.25 SOLUTION RESPIRATORY (INHALATION) at 19:16

## 2022-08-01 RX ADMIN — CALCIUM CARBONATE 600 MG (1,500 MG)-VITAMIN D3 400 UNIT TABLET 1 TABLET: at 10:31

## 2022-08-01 RX ADMIN — CALCIUM CARBONATE 600 MG (1,500 MG)-VITAMIN D3 400 UNIT TABLET 1 TABLET: at 17:33

## 2022-08-01 RX ADMIN — OXYCODONE HYDROCHLORIDE 10 MG: 5 TABLET ORAL at 17:33

## 2022-08-01 RX ADMIN — Medication 40 MG: at 21:04

## 2022-08-01 RX ADMIN — INSULIN ASPART 1 UNITS: 100 INJECTION, SOLUTION INTRAVENOUS; SUBCUTANEOUS at 21:05

## 2022-08-01 RX ADMIN — ACETYLCYSTEINE 2 ML: 200 INHALANT RESPIRATORY (INHALATION) at 19:16

## 2022-08-01 RX ADMIN — LEVALBUTEROL HYDROCHLORIDE 1.25 MG: 1.25 SOLUTION RESPIRATORY (INHALATION) at 08:30

## 2022-08-01 RX ADMIN — SODIUM CHLORIDE 500 ML: 9 INJECTION, SOLUTION INTRAVENOUS at 15:33

## 2022-08-01 RX ADMIN — VALGANCICLOVIR HYDROCHLORIDE 450 MG: 50 POWDER, FOR SOLUTION ORAL at 08:11

## 2022-08-01 RX ADMIN — Medication 1 PACKET: at 12:09

## 2022-08-01 RX ADMIN — METOPROLOL TARTRATE 25 MG: 25 TABLET, FILM COATED ORAL at 08:12

## 2022-08-01 RX ADMIN — ACETAMINOPHEN 975 MG: 325 TABLET, FILM COATED ORAL at 21:05

## 2022-08-01 RX ADMIN — ACETAMINOPHEN 975 MG: 325 TABLET, FILM COATED ORAL at 08:13

## 2022-08-01 RX ADMIN — ACETAMINOPHEN 975 MG: 325 TABLET, FILM COATED ORAL at 14:33

## 2022-08-01 RX ADMIN — OXYCODONE HYDROCHLORIDE 10 MG: 5 TABLET ORAL at 00:16

## 2022-08-01 RX ADMIN — MYCOPHENOLATE MOFETIL 500 MG: 200 POWDER, FOR SUSPENSION ORAL at 08:12

## 2022-08-01 RX ADMIN — NYSTATIN 1000000 UNITS: 100000 SUSPENSION ORAL at 08:11

## 2022-08-01 RX ADMIN — OXYCODONE HYDROCHLORIDE 5 MG: 5 TABLET ORAL at 21:12

## 2022-08-01 RX ADMIN — OXYCODONE HYDROCHLORIDE 10 MG: 5 TABLET ORAL at 10:37

## 2022-08-01 RX ADMIN — HEPARIN SODIUM 800 UNITS/HR: 10000 INJECTION, SOLUTION INTRAVENOUS at 11:52

## 2022-08-01 RX ADMIN — INSULIN ASPART 2 UNITS: 100 INJECTION, SOLUTION INTRAVENOUS; SUBCUTANEOUS at 15:41

## 2022-08-01 RX ADMIN — ACETYLCYSTEINE 2 ML: 200 INHALANT RESPIRATORY (INHALATION) at 08:29

## 2022-08-01 RX ADMIN — THERA TABS 1 TABLET: TAB at 12:08

## 2022-08-01 RX ADMIN — INSULIN ASPART 1 UNITS: 100 INJECTION, SOLUTION INTRAVENOUS; SUBCUTANEOUS at 08:21

## 2022-08-01 RX ADMIN — TACROLIMUS 2 MG: 5 CAPSULE ORAL at 08:11

## 2022-08-01 RX ADMIN — MYCOPHENOLATE MOFETIL 500 MG: 200 POWDER, FOR SUSPENSION ORAL at 21:04

## 2022-08-01 RX ADMIN — PREDNISONE 12.5 MG: 2.5 TABLET ORAL at 08:13

## 2022-08-01 RX ADMIN — INSULIN ASPART 2 UNITS: 100 INJECTION, SOLUTION INTRAVENOUS; SUBCUTANEOUS at 04:25

## 2022-08-01 ASSESSMENT — ACTIVITIES OF DAILY LIVING (ADL)
ADLS_ACUITY_SCORE: 30

## 2022-08-01 NOTE — PROGRESS NOTES
Pulmonary Medicine  Cystic Fibrosis - Lung Transplant Team  Progress Note  2022     Patient: Sofie Rodriguez  MRN: [id number]  : 1960 (age 62 year old)  Transplant: 2022 (Lung), POD#34  Admission date: 2022    Assessment & Plan:     Sofie Rodriguez is a 62 year old female with a PMH significant for end-stage COPD, HTN, HFpEF, Mycobacterium peregrinum colonization, h/o hepatitis C, HECTOR s/p LEEP procedure, osteopenia, and former methamphetamine use.  Pt. is now s/p BSLT on 22, lungs slightly undersized.   Persistent low dose pressor needs post-op through 7/10.  Extubated POD #2 but with persistent hypercapnia, mostly compensated and only slightly improved with intermittent BiPAP.  Also with left radial artery thrombus (presumed secondary to arterial line) s/p thrombectomy , BACILIO, and C diff.  Rehabilitation and airway clearance initially limited by dysrhythmia and gastric discomfort, now with gradual improvement.  Remains on 0.5-2L oxymask (SpO2 high 80s on RA) during the day and BiPAP overnight.  S/p GJ tube placement in IR .      Today's recommendations:  - Tacro level today 14 hour but likely at goal   -repeat level 8/3 ordered  - Diuresis per primary team  - IVIG received , recheck around  (not ordered)  - Okay to leave on room air and see if she can self recover within 5 minutes, needs 1-2L with sleep  - Continue off bipap at night   - Okay for small amounts of food, but advised to stop if bloating and early satiety  - DSA  negative  - EBV  log 4.1  - donor risk labs () negative  - Decreased nebs and chest physiotherapy to TID  -  VBG ordered every morning  - Following pending pleural cultures, right chest tube to remain (followed by CVTS), left chest tube to remain to help keep lung approximated to chest wall (placed by IR), right chest tube placed to water seal today  - CXR daily as below  - Repeat inspection bronch scheduled  at 11am, NPO at 0500  -  Prednisone taper next due 8/4 (not yet ordered)  - Continuing dapsone at intermittent dosing given slow anemia, suspect phlebotomized  - Defer transition to DOAC at this time given chest tubes     S/p bilateral sequential lung transplant (BSLT) for end stage COPD:  Persistent hypercapneic respiratory failure:   Persistent hypotension, Resolved:   Bilateral hydroPTX:  Right hemidiaphragm palsy: Explant pathology with severe emphysema with subpleural bullae formation, changes of chronic bronchitis, subpleural scars and patchy pulmonary edema; benign hilar lymph nodes.  No evidence of PGD post-op.  Extubated 6/30.  Persistent hypercapnia without dyspnea, appears to be a respiratory drive problem.  Persistent low dose pressors weaned off 7/10 and scheduled midodrine topped 7/19.  SNIFF (7/14) notable for right hemidiaphragm palsy.  Chest CT (7/18) with bilateral biapical effusions R>L and improved LLL atelectasis, also with LLL hydroPTX and bilateral PTX; bilateral chest tubes not communicating well with loculated effusion areas.  Bronch (7/19) with slight graying of mucosa noted at right anastomosis and scant secretions (slightly increased in RLL).  Left surgical chest tube removed 7/20.  Chest CT (7/23) with increased loculated fluid within the left hemithorax with specks of air density in the loculated fluid, similar appearing loculated right hydroPTX with minimal decrease in fluid component (right chest tube appears to be outside the loculated hydroPTX), increased size of pleural based lesion in MARLON, and mild subcutaneous emphysema along right chest tube with minimal along tract of removed left chest tube.  S/p left apical chest tube placed by IR 7/25, exudative.  On 0.5-2L oxymask while awake (SpO2 high 80s on RA), BiPAP overnight (ongoing hypercapnia).  - DSA one month post-transplant 7/28 negative  - Ammonia monitoring twice weekly (screening for hyperammonemia post-lung transplant)  - Nebs: levalbuterol and  Mucomyst TID  - Pulmonary toilet with chest physiotherapy TID  - Aerobika and incentive spirometry hourly while awake  - Supplemental oxygen as needed to maintain SpO2 >92% (wean as able), off BiPap with VBG every morning (ordered)  - Right chest tube (managed by surgical team) and left apical chest tube (placed by IR), remain with low-moderate output  - CXR daily while chest tubes remain  - Chest CT without contrast 7/28 unlikely to be able to place pigtail or thora the effusion remaining, will attempt diuresis once GT output decreases and met alkalosis improves  - Volume management per primary team  - Repeat inspection bronch next week (to evaluate anastomoses), scheduled 8/2 at 11am, NPO at 0500  - Encourage activity including up to the chair and PT/OT  - Pain management per primary team     Immunosuppression:  Induction therapy with basiliximab (and high dose IV steroid).  - Tacrolimus 2 mg BID.  Goal level 8-12.  Level 7/30, 30.5, Tacro 11 hour level 22.5 on 7/30, 22 hour level 15 this morning, will restart at 2 mg bid starting 7/31 pm. Suspect better absorption through GJ as a result for rapid increase  -  mg BID (decreased 7/27, GI symptoms/leukopenia), will transition back to PO Myfortic once tolerating PO medications consistently with improvement in gastroparesis   - Prednisone 12.5 mg BID, next taper due 8/4 (not yet ordered)  Date AM dose (mg) PM dose (mg)   7/28/22 12.5 12.5   8/4/22 12.5 10   8/18/22 10 10   9/15/22 10 7.5   10/13/22 7.5 7.5   11/10/22 7.5 5   12/8/22 5 5   1/5/23 5 2.5      Prophylaxis:   - Dapsone for PJP ppx (7/18), defer increasing to daily pending hgb stability (once consistently >8, revisit 8/1)  - VGCV for CMV ppx, CMV negative 7/25  - Nystatin for oral candidiasis ppx, 6 month course  - See below for serologies and viral ppx:    Donor Recipient Intervention   CMV status Positive Positive Valganciclovir POD #8-90   EBV status Positive Positive EBV check monthly (7/28,  pending)   HSV status N/A Positive Not indicated      ID:  H/o M. peregrinum colonization: Donor bronch cultures (OSH) with Strep beta hemolytic (not group A).  Recipient cultures as below.  Bronch cultures (7/12) NGTD.  - AFB bronch culture (6/28, 6/29, 7/12) NGTD, AFB to be sent on all future bronchs  - Right pleural cultures (7/20) NGTD  - Left pleural cultures (7/25) NGTD     Streptococcus pneumoniae:  Stenotrophomonas maltophilia: Noted in recipient cultures at time of transplant.  S/p ceftazidime 6/28-7/10, vancomycin 7/7-7/8 and 6/28-6/30, and levofloxacin 7/10-7/12 for total 2 week ABX course.     Hypogammaglobulinemia: IgG adequate at time of transplant (1,381) and now low (336) on 7/28.  - Recommend IVIG with premedications on 7/30  - repeat level in one month, not ordered     H/o hepatitis C: Diagnosed in 1980s, 2 mos of treatment, quant negative since 10/2017, last positive 2/20/17 ([phone]).  Glenis positive on 6/2021 with negative HCV PCR.  H/o remote EtOH abuse.  MR elastography (4/27/21) with hepatology review and consult without any concerns post transplant.  Hepatitis C RNA negative and hepatitis C antibody positive (old) on 6/28.     PHS risk criteria donor:  Additional labs required post-transplant (between 4-8 weeks post-op): Hepatitis B, Hepatitis C, and HIV by SHERI ([id number], negative 7/28).     Other relevant problems managed by primary team:      SVT:   Aflutter with RVR: SVT first noted on 7/14, prior to HD line placement.  Continues intermittently.  Aflutter with RVR to 200 7/17 triggered by activity.  EP consulted 7/17 given persistent tachycardia/dysrhythmia.  Midodrine held 7/19.  - Metoprolol per primary team (started 7/15)  - Heparin drip (7/17), defer transition to DOAC at this time given chest tubes     Left hand ischemia: Radial artery thrombosis identified on duplex doppler.  S/p thrombectomy on 7/2.  Completed high intensity heparin course.  Continue daily aspirin- currently held for  bronch.       BACILIO:   Hyperkalemia: Likely multifactorial including medications (Bactrim, tacrolimus) and hypotension.  Fludrocortisone 7/6-7/11.  Potassium now stable.  S/p non-tunneled HD line 7/14.  Initial iHD run 7/14 limited by afib with RVR vs SVT.  Last iHD run 7/16, line removed 7/22.Cr rising to 1.5 on 7/29 with diamox and elevated tac level.   - Tacrolimus monitoring as above  - Management per nephrology and primary team     Abdominal pain: Noted 7/15, cramping pain.  ACR with large stool burden in colon, no obstruction or distention.  Some nausea but no emesis.  CT abdomen (7/15) with moderate to large gastric distention, otherwise without obstruction.  NG placed for LIS with moderate to large output for several days.  Increased abdominal pain 7/17 after clamping for 4 hours, tolerated clamping today without issue.  Gastric emptying study (7/20) with severe gastric emptying delay (95% retention at 4 hours).  S/p GJ tube placement in IR 7/27.  - Simethicone prn  - TF management per RD and primary team, G tube clamped and TF via J tube     C diff infection: Abdominal pain with diarrhea noted 7/8, AXR without dilated bowel, moderate colonic stool burden.  C diff positive 7/11.  Loose stools (on tube feeds) stable.  - PO vancomycin (7/11-7/28) per primary team, will need to revisit resuming if to begin on ABX     Hypomagnesemia: Suppressed absorption d/t CNI.   - Continue daily magnesium with replacement protocol prn, schedule oral magnesium supplementation if needed pending improvement in stools     Anemia: Suspect related to blood draws. Stable on 7/31.   - s/p Transfusion 1U PRBCs on 7/30     We appreciate the excellent care provided by the [place] [id number] team.  Recommendations communicated via in person rounding and this note.  Will continue to follow along closely, please do not hesitate to call with any questions or concerns.    Monik Paredes MD MPH  Associate Professor of Medicine  Pager  "[phone]    Subjective & Interval History:     Patient with pain at right chest tube site.  Comfortable at rest.  No significant cough.  Slept well and feels good this am without using bipap    Review of Systems:     C: No fever, decrease in weight, small amount of po  INTEGUMENTARY/SKIN: No rash or obvious new lesions  ENT/MOUTH: No sore throat, no sinus pain, no nasal congestion or drainage  RESP: See interval history  CV: + chest pain, no palpitations, no peripheral edema  GI: + nausea, no vomiting, decrease in stools, no reflux symptoms  : No dysuria  MUSCULOSKELETAL: No myalgias, no arthralgias  ENDOCRINE: Blood sugars with adequate control  NEURO: No headache  PSYCHIATRIC: Mood stable    Physical Exam:     All notes, images, and labs from past 24 hours (at minimum) were reviewed.    Vital signs:  Temp: 98.8  F (37.1  C) Temp src: Oral BP: 119/66 Pulse: 112   Resp: 14 SpO2: 95 % O2 Device: Nasal cannula Oxygen Delivery: 2 LPM Height: 157.5 cm (5' 2\") Weight: 73.5 kg (162 lb)  I/O:     Intake/Output Summary (Last 24 hours) at 8/1/2022 1633  Last data filed at 8/1/2022 1600  Gross per 24 hour   Intake 1497.6 ml   Output 1620 ml   Net -122.4 ml     Constitutional: sitting in bad, in no apparent distress.   HEENT: Eyes with pink conjunctivae, anicteric.  Oral mucosa moist without lesions.  Neck supple without lymphadenopathy.   PULM: fair air flow bilaterally.  No crackles, no rhonchi, no wheezes.   CV: Normal S1 and S2.  RRR.  No murmur, gallop, or rub.  No peripheral edema.   ABD: NABS, soft, nontender, nondistended.    MSK: Moves all extremities.  No apparent muscle wasting.   NEURO: Alert and oriented, conversant.   SKIN: Warm, dry.  No rash on limited exam.   PSYCH: Mood stable.     Lines, Drains, and Devices:  Peripheral IV 07/20/22 Anterior;Right Lower forearm (Active)   Site Assessment WDL 08/01/22 1200   Line Status Saline locked 08/01/22 1200   Dressing Intervention New dressing  07/20/22 1548 "   Phlebitis Scale 0-->no symptoms 08/01/22 1200   Infiltration Scale 0 08/01/22 1200   Number of days: 12       Midline Catheter Double Lumen (Active)   Site Assessment WDL 08/01/22 1200   External Cath Length (cm) 2 cm 07/28/22 1455   Initial Extremity Circumference (cm) 29 cm 07/28/22 1455   Dressing Intervention Chlorhexidine patch;Transparent 08/01/22 1200   Line Necessity Yes, meets criteria 08/01/22 1200   Dressing Change Due 08/04/22 08/01/22 1200   Purple - Status infusing 08/01/22 1200   Purple - Cap Change Due 08/03/22 08/01/22 1200   Red - Status saline locked 08/01/22 1200   Red - Cap Change Due 08/03/22 08/01/22 1200   Extravasation? No 07/31/22 2000   Number of days: 4     Data:     LABS    CMP:   Recent Labs   Lab 08/01/22  1537 08/01/22  1218 08/01/22  0832 08/01/22  0820 07/31/22  0942 07/31/22  0622 07/30/22  0838 07/30/22  0350 07/29/22  0818 07/29/22  0446 07/28/22  1049 07/28/22  0925   NA  --   --  140  --   --  138  --  142  --  145  --  144   POTASSIUM  --   --  4.2  --   --  4.5  --  4.5  --  4.5  --  4.1   CHLORIDE  --   --  91*  --   --  89*  --  91*  --  91*  --  90*   CO2  --   --  47*  --   --  46*  --  49*  --  48*  --  >50*   ANIONGAP  --   --  2*  --   --  3*  --  2*  --  6*  --   --    * 122* 144* 161*   < > 152*   < > 185*   < > 165*   < > 120*   BUN  --   --  80.2*  --   --  88.3*  --  84.2*  --  86.8*  --  88.1*   CR  --   --  1.67*  --   --  1.86*  --  1.61*  --  1.50*  --  1.44*   GFRESTIMATED  --   --  35*  --   --  30*  --  36*  --  39*  --  41*   ESTUARDO  --   --  9.0  --   --  8.9  --  9.1  --  9.2  --  9.6   MAG  --   --  2.7*  --   --  2.7*  --  2.6*  --  2.5*  --  2.5*   PHOS  --   --  4.7*  --   --  5.2*  --  4.8*  --  4.3  --  3.8   PROTTOTAL  --   --  5.3*  --   --   --   --   --   --   --   --  5.2*   ALBUMIN  --   --  2.8*  --   --   --   --   --   --   --   --  3.0*   BILITOTAL  --   --  0.2  --   --   --   --   --   --   --   --  0.2   ALKPHOS  --   --  68  --    --   --   --   --   --   --   --  60   AST  --   --  22  --   --   --   --   --   --   --   --  17   ALT  --   --  12  --   --   --   --   --   --   --   --  12    < > = values in this interval not displayed.     CBC:   Recent Labs   Lab 07/31/22  0622 07/30/22  0350 07/29/22  0446 07/28/22  0925   WBC 6.3 5.9 4.9 3.9*   RBC 2.61* 2.08* 2.19* 2.70*   HGB 8.0* 6.7* 7.0* 8.7*   HCT 26.7* 22.4* 23.8* 29.6*   * 108* 109* 110*   MCH 30.7 32.2 32.0 32.2   MCHC 30.0* 29.9* 29.4* 29.4*   RDW 19.1* 16.3* 16.3* 16.9*    244 265 240       INR:   Recent Labs   Lab 07/27/22  0646   INR 0.87       Glucose:   Recent Labs   Lab 08/01/22  1537 08/01/22  1218 08/01/22  0832 08/01/22  0820 08/01/22  0410 08/01/22  0009   * 122* 144* 161* 181* 134*       Blood Gas:   Recent Labs   Lab 08/01/22  0832 07/31/22  0622 07/30/22  0350   PHV 7.42 7.40 7.43   PCO2V 75* 78* 76*   PO2V 34 36 43   HCO3V 49* 48* 50*   LINDY 21.8* 20.3* 23.3*   O2PER 35 28 30       Culture Data No results for input(s): CULT in the last 168 hours.    Virology Data:   Lab Results   Component Value Date    FLUAH1 Not Detected 06/29/2022    FLUAH3 Not Detected 06/29/2022    WC4226 Not Detected 06/29/2022    IFLUB Not Detected 06/29/2022    RSVA Not Detected 06/29/2022    RSVB Not Detected 06/29/2022    PIV1 Not Detected 06/29/2022    PIV2 Not Detected 06/29/2022    PIV3 Not Detected 06/29/2022    HMPV Not Detected 06/29/2022       Historical CMV results (last 3 of prior testing):  Lab Results   Component Value Date    CMVQNT Not Detected 07/25/2022     No results found for: CMVLOG    Urine Studies    Recent Labs   Lab Test 08/01/22  0319 07/08/22  0831 07/05/22  1004   URINEPH 8.0* 5.5 5.5   NITRITE Negative Negative Negative   LEUKEST Negative Negative Negative   WBCU  --  1 5       Most Recent [facility] Pulmonary Function Testing (FVC/FEV1 only)  FVC-Pre   Date Value Ref Range Status   04/29/2022 1.82 L    11/11/2021 2.17 L    06/14/2021 2.00 L       FVC-%Pred-Pre   Date Value Ref Range Status   04/29/2022 58 %    11/11/2021 70 %    06/14/2021 64 %      FEV1-Pre   Date Value Ref Range Status   04/29/2022 0.51 L    11/11/2021 0.53 L    06/14/2021 0.54 L      FEV1-%Pred-Pre   Date Value Ref Range Status   04/29/2022 20 %    11/11/2021 21 %    06/14/2021 21 %        IMAGING    Recent Results (from the past 48 hour(s))   XR Chest 2 Views    Narrative    EXAM: XR CHEST 2 VIEWS 7/31/2022 9:11 AM    HISTORY: interval follow up, lung transplant, bilateral chest tubes.    COMPARISON: Previous day.    TECHNIQUE: Upright frontal and lateral views of the chest.    FINDINGS: Left apical chest tube in place. Right basilar chest tube in  place. Left-sided CVC with tip in the subclavian  vein. Postsurgical  changes of the chest with contrast sternotomy wires intact.  Trachea is midline. Cardiomediastinal silhouette is stable. No new  pulmonary opacities. Stable streaky perihilar pulmonary opacities.  Right hydropneumothorax with locule of air in the minor fissure.  Stable left pleural effusion. Left hydropneumothorax with  air along  the left lower zone loculated collection      Impression    IMPRESSION:     1. Loculated right hydropneumothorax with a locule of air in the minor  fissure. Right basilar chest tube in stable position.  2. Loculated left hydropneumothorax with  air along the left lower  zone loculated collection. Stable position of left apical chest tube.  3. Stable bilateral atelectasis.    I have personally reviewed the examination and initial interpretation  and I agree with the findings.    NIKOS JAVED MD         SYSTEM ID:  [id number]   XR Chest 2 Views    Narrative    EXAM:  XR CHEST 2 VIEWS    INDICATION: interval follow up, lung transplant, bilateral chest tubes    COMPARISON:  Chest x-ray 7/31/2022    HISTORY:  History of end-stage COPD S/P bilateral lung transplant  6/20/2022. Lungs were noted to be slightly undersized.    FINDINGS:  PA and lateral  views of the chest. Postsurgical chest with intact  clamshell thoracotomy wires. Right basilar and left apical chest  tubes, stable. Left-sided central venous catheter terminating over the  subclavian vein, stable.    Cardiomediastinal silhouette within normal limits. Post surgical  changes of bilateral lung transplant. Stable perihilar opacities.  Right hydropneumothorax with decreased size of air locule. Left  hydropneumothorax with decreased but persistent air locule and some  increased fluid component. Unremarkable upper abdomen. No acute bony  lesions.      Impression    IMPRESSION:  1.  Right loculated hydropneumothorax with decreased size of the right  air locule overlying the minor fissure.  2.  Slightly increased fluid component with stable air component of  the left loculated hydro-pneumothorax.    I have personally reviewed the examination and initial interpretation  and I agree with the findings.    ALVINA HARRY MD         SYSTEM ID:  [id number]

## 2022-08-01 NOTE — PLAN OF CARE
Major Shift Events:  A&Ox4, cooperative, pleasant. SR-sinus tach, afebrile. On 1L NC overnight during sleep study. Voiding into commode, no BM overnight. TF running @ 35 mL/hr, reg diet - taking oral intake slowly. BG q4h. R/L CT -20 suction. L midline, R PIV infusing hep drip @ 800units/hr. PRN oxy given. UA sent this shift.  Plan: bronch 8/2 - NPO @ 5AM that morning, notify team with acute changes    For vital signs and complete assessments, please see documentation flowsheets.    Goal Outcome Evaluation:    Plan of Care Reviewed With: patient     Overall Patient Progress: no change    Outcome Evaluation: Plan for bronch on 8/2, Pt did sleep study overnight on 1L NC

## 2022-08-01 NOTE — PROGRESS NOTES
Transplant Social Work Services Progress Note      Date of Initial Social Work Evaluation: 4/8/2021  Collaborated with: rehab services, accomodations    Data: informed by accommodations department that there is an opening at [facility] apartments for Sofie and her family this week.  Patient remain hospitalized, mostly held up by chest tube drainage. But if this should decrease could be ready for discharge with in a week.  Met with patient to discuss.  She was just finishing with rehab.  Discussed discharge needs, patient has progressed to likely being able to discharge straight to the apartment with assist from family.    Intervention: discussed timing of taking the apartment.  Since patient could be d/juana within in a week and since family will need to be in town for teaching, it makes sense for patient to take open apartment.  I will discuss with accommodations and make arrangements for family to meet with them and check into the apartment later this week.    Assessment: patient improving  Education provided by SW: lodging resources  Plan:    Discharge Plans in Progress: [facility] apartment with assist from family.      Barriers to d/c plan: chest tube drainage    Follow up Plan: I will reach out to daughters to discuss apartment plan and potential time frames for discharge.       Mana Valdivia [facility]  Lung Transplant   Phone: [phone] Pager: [phone]

## 2022-08-01 NOTE — PROGRESS NOTES
[facility]    Medicine Progress Note - Hospitalist Service, GOLD TEAM 10    Date of Admission:  6/28/2022    Assessment & Plan  Sofie Rodriguez is a 62 year old female admitted on 6/28/2022. She has PMH of end stage COPD s/p b/l lung transplant on 6/28/2022, HTN, HFpEF, h/o hepC, oteopenia, and former methamphetamine use, and she was transferred to [place] [id number] [facility] from MICU on 7/15/2022. She was admitted to the MICU on 7/13/20222 with prior hospital course complicated by left upper extremity acute limb ischemia s/p left radial thrombectomy on 7/1/2022. She was primarily treated for acute hypercapnic respiratory failure on intermittent BIPAP with worsening BACILIO while in the MICU. Breathing is improving, but patient has severely delayed gastric emptying found on NM study. PEGJ placement ordered, pending supply chain issue resolution.       Today's Plan:  - 7/27: s/p IR placed PEGJ    G: CLAMPED, and Tacro dosing   J for feeding and meds  - Heparin gtt to resume & DOAC prior to discharge  - Continue to encourage OOB and up in chair  - BIPAP at night only if necessary, VBG in the AM  - Overnight oxymetry was > 92%  - Follow-up pleural fluid studies for L Apical Fluid Collection, Pigtail by IR 7/25  - 7/30: IVIG dose, 1 unit prbc given for Hb 6.7  - 7/31: BACILIO and renal consult, Tacro on hold  - Titrate Glargine as needed for BG trending  - Caution diet advancement as severe gastroparesis history  - 8/1: 500 ml NS bolus today per renal recs  EBV DNA Copies/mL <=0 copies/mL 11,820 High          End stage COPD s/p BSLT 6/28/2022  Acute hypercapnic hypoxic respiratory failure (improving)  EBV Viremia  likely 2/2 decreased central respiratory drive vs respiratory muscle weakness and some pulmonary edema / fluid Transplanted 6/28. formal SNIFF test was performed on 7/14 showed R hemidiaphragm palsy. Of note transplanted lungs were also considered small for body size and could  contribute to decreased respiratory compensation for hypercarbia. VBG relatively stable, most recent pCO2 74 (7/22).   - oxycodone 5-10 mg q4h PRN   - encourage activity and getting up in bed; PT/OT participation  - encourage chest physiotherapy QID  - weaned off Bipap at night  Immunosuppression:  - Prednisone 12.5 BID  - Tacrolimus 5.5mg BID (trough goal 8-12) -- dosing per Pulm Tx  -  BID  Prophylasxis:  - CMV - Valgancyclover  - Thrush - PO nysatin  - PJP - TMP-SMX (currently held given BACILIO); dapsone started 7/18 at 50mg qMWF (will try to increase to daily on 7/25 per pulm)     Pleural Effusion, Right and Left  - 1 R chest tube (basilar) in place currently (pericardial drain was removed 7/15, L basilar was removed 7/20). CT chest 7/14 showing unchanged bilateral effusions and unchanged biapical pneumothoraces with resolved left basilar pneumothorax; bibasilar chest tubes in place with pericardial drain (which was removed 7/15).   - daily CXR  - RIGHT Chest Tube - still with outputs noted  - LEFT Apical Fluid collection - IR targeting via CT guided pigtail placement     Hypotension, resolved  H/o HTN  H/o HFpEF  Likely vasoplegia post operatively. Last echo 7/7 normal EF with good LV function. S/p hydrocortisone 7/6-7/9 and fludrocortisone 7/6-7/11 without significant improvement.  - off midorine 7/19  - Holding PTA lasix 20mg daily    BACILIO-- recurrent, improved 8/1-- likely due to supratherapeutic tacrolimus  Hypermagnesemia  Hyperphosphatemia  Metabolic alkalosis  Baseline renal function was normal prior to surgery. New onset renal failure after transplant, likely multifactorial due to pre-renal hypotension, less likely nephrotoxic agents. Overall kidney function improving. Today, Cr fluctuating but BUN increasing, with unclear urine output (7/22).   - HD cath removed 7/22, trend metabolites  - Adequate UOP despite Cr increase  - 8/1: 500 ml NS per Renal recs    C.diff - vanco started 7/12, course  completed    Severe Gastroparesis - gastric emptying study 7/20  - PO vanc 125mg QID 7/12 to present  - gastric emptying study 7/20 showed delayed gastric emptying with retention of 95% of stomach contents after 4 hours; please keep patient NPO except tube feeds and meds  - Simethicone scheduled  - 7/27: PEGJ     NJ tube  Feeding regimen:   - Adult Formula Drip Feeding: Continuous Novasource Renal; Nasojejunal; Goal Rate: 35; initiate at goal rate; mL/hr; Medication - Feeding Tube Flush Frequency: At least 15-30 mL water before and after medication administration and with tube clogging     Tachyarrthymia  Paroxysmal afib  Paroxysmal aflutter/AT  SVT vs afib.Had an occurrence during HD on 7/14. Had afib with RVR to 200s on 7/17. EP consult placed.asmyptomatic and stable during episodes. Aflutter episode (7/17) with transfer. Vagal maneuver responsive at time. No recent tachyarrhythmia episodes (7/22).   - Per EP: patient has had episodes of possible flutter (vs AT with 2:1 conduction) and afib with RVR  - heparin drip and transition to DOAC after PEGJ placement and chest tubes resolved (CHADS-VASc score of 2)  - On telemetry  - On metoprolol tartrate 25mg BID  - Discharge on ziopatch for 14 days with EP follow up in 1-2 months after     Stress-induced hyperglycemia  BG close to 100 last 24 hrs.   - on 15U glargine BID;>> Decrease to 13 units BID    Acute Blood Loss Anemia, stable  Initially from acute blood loss. Hb dropped to 6.8 on 7/14, requiring 1U pRBC. Post-transfusion Hb 9.4 > 8.3 > 8.6 > 8.7 (7/18).   - 7/30: s/p 1 unit prbc   - continue to monitor  - transfuse for hgb<7        Diet: Adult Formula Drip Feeding: Continuous Novasource Renal; Jejunostomy; Goal Rate: 35; mL/hr; Medication - Feeding Tube Flush Frequency: At least 15-30 mL water before and after medication administration and with tube clogging; Amount to Send (Nutri...  NPO per Anesthesia Guidelines for Procedure/Surgery Except for: No  Exceptions  Regular Diet Adult    DVT Prophylaxis: heparin gtt  Dong Catheter: Not present  Central Lines: PRESENT       Cardiac Monitoring: None  Code Status: Full Code      Disposition Plan        The patient's care was discussed with the Patient, Patient's Family and Pulm Tx Consultant.    Catalino Pantoja MD  Hospitalist Service, GOLD TEAM 10  [facility]  Securely message with the Vocera Web Console (learn more here)  Text page via [facility] Paging/Directory   Please see signed in provider for up to date coverage information      Clinically Significant Risk Factors Present on Admission                      ______________________________________________________________________    Interval History   Seen today for follow up: Post lung transplant, pleural effusion, chest tubes    No acute overnight events per patient or patient's family.    Seen sitting upright in wheelchair going for CXR. No edema, breathing comfortable, Chest tubes in place. Occasional dyspnea and cough reported, no headache.   Overall, feels a little improved from last 2 days.    As of today/at time of my visit:  Patient denies any headache, sore throat  Patient denies any sleeping disturbance  Patient denies any nausea or vomiting  Patient reports no abdominal pain  Patient denies any chest pain  Patient reports no arm or leg edema      Data reviewed today: I reviewed all medications, new labs and imaging results over the last 24 hours. I personally reviewed no images or EKG's today.    Physical Exam   Vital Signs: Temp: 97.8  F (36.6  C) Temp src: Oral BP: 136/79 Pulse: 103   Resp: 10 SpO2: 97 % O2 Device: Nasal cannula Oxygen Delivery: 2 LPM  Weight: 162 lbs 0 oz  General: non-distressed adult  HEENT: Normocephalic, atraumatic, pupils equal round reactive, membranes moist  CV: normal capillary refill, heart regular rate and rhythm, tele  Respiratory: Non-labored breathing, bronchovesicular lung sounds,  R Chest Tube noted, draining serosangiunous. L apical pigtail chest tube in place  Abdominal: soft, mildly tender in the epigastrium, no rebound, no guarding, no rigidity, +bowel sounds, PEGJ in place  Genitourinary: no suprapubic tenderness  Musculoskeletal: normal bulk and tone  Skin: no rash, normal turgor  Neurologic: no facial droop, moving upper and lower extremities spontaneously, sensation in tact to light touch on extremities  Psychiatric: normal mood and affect    Data   Recent Labs   Lab 08/01/22  1218 08/01/22  0832 08/01/22  0820 07/31/22  0942 07/31/22  0622 07/30/22  0838 07/30/22  0350 07/29/22  0818 07/29/22  0446 07/28/22  1049 07/28/22  0925 07/27/22  0801 07/27/22  0646   WBC  --   --   --   --  6.3  --  5.9  --  4.9  --  3.9*  --  3.1*   HGB  --   --   --   --  8.0*  --  6.7*  --  7.0*  --  8.7*  --  8.3*   MCV  --   --   --   --  102*  --  108*  --  109*  --  110*  --  107*   PLT  --   --   --   --  252  --  244  --  265  --  240  --  235   INR  --   --   --   --   --   --   --   --   --   --   --   --  0.87   NA  --  140  --   --  138  --  142  --  145  --  144  --  143   POTASSIUM  --  4.2  --   --  4.5  --  4.5  --  4.5  --  4.1  --  4.9   CHLORIDE  --  91*  --   --  89*  --  91*  --  91*  --  90*  --  90*   CO2  --  47*  --   --  46*  --  49*  --  48*  --  >50*  --  45*   BUN  --  80.2*  --   --  88.3*  --  84.2*  --  86.8*  --  88.1*  --  84.6*   CR  --  1.67*  --   --  1.86*  --  1.61*  --  1.50*  --  1.44*  --  1.35*   ANIONGAP  --  2*  --   --  3*  --  2*  --  6*  --   --   --  8   ESTUARDO  --  9.0  --   --  8.9  --  9.1  --  9.2  --  9.6  --  9.7   * 144* 161*   < > 152*   < > 185*   < > 165*   < > 120*   < > 85   ALBUMIN  --  2.8*  --   --   --   --   --   --   --   --  3.0*  --   --    PROTTOTAL  --  5.3*  --   --   --   --   --   --   --   --  5.2*  --   --    BILITOTAL  --  0.2  --   --   --   --   --   --   --   --  0.2  --   --    ALKPHOS  --  68  --   --   --   --   --   --    --   --  60  --   --    ALT  --  12  --   --   --   --   --   --   --   --  12  --   --    AST  --  22  --   --   --   --   --   --   --   --  17  --   --     < > = values in this interval not displayed.

## 2022-08-01 NOTE — PROGRESS NOTES
"  Nephrology Progress Note  08/01/2022         Assessment & Recommendations:   62 year old female with severe COPD, s/p BSLT on 6/28/22, developed post-op BACILIO requiring iHD from 7/14 - 7/16. Subsequently had improvement in renal function and dialysis catheter removed.     1. Non oliguric BACILIO - etiology likely secondary to supra therapeutic Tacrolimus levels.   2. Recent BACILIO Stage 3 (post-op) requiring HD from 7/14-7/16  3. S/p BSLT on 6/28/22  4. Combined respiratory acidosis and metabolic alkalosis: Patient has PH of 7.42 on vbg and real PH would be >7.45 she has CO2 retention of 75 and bicarb of 46 if appropriate compensation it should be 36. Patient had high output from peg tube and has low chloride probably has contraction alkalosis    Plan:  - Give IV NS of 500 ml  - Adjust Tacrolimus dose for target level.   - Monitor renal panel daily  - Strict intake/output and daily weight monitoring  - Renally dose all medications.     Recommendations were communicated to primary team via note    Seen and discussed with Dr. Nkechi Alanis MD   Division of Renal Disease and Hypertension  [facility]ly [facility]  [email or web address]  Vocera Web Console    Interval History :   Nursing and provider notes from last 24 hours reviewed.  In the last 24 hours Sofie Rodriguez has improvement in her kidney function she still have metabolic alkalosis despite getting diamox before  Review of Systems:   I reviewed the following systems:  GI: decreased appetite. No nausea or vomiting or diarrhea.   Neuro:  No confusion  Constitutional:  No fever or chills  CV: No dyspnea or edema.  No chest pain.    Physical Exam:   I/O last 3 completed shifts:  In: 1511.6 [I.V.:271.6; NG/GT:400]  Out: 1620 [Urine:1200; Chest Tube:420]   /66 (BP Location: Right arm)   Pulse 112   Temp 98.8  F (37.1  C) (Oral)   Resp 14   Ht 1.575 m (5' 2\")   Wt 73.5 kg (162 lb)   SpO2 95%   BMI 29.63 kg/m       GENERAL APPEARANCE: Patient is not in acute distress  EYES:  no " scleral icterus, pupils equal  PULM: lungs clear to auscultation bilaterally, equal air movement, no clubbing  CV: Patient has regular rhythm, normal rate, no rub     -JVD -ve     -edema +2   GI: soft, non tender, non distended, bowel sounds are present  INTEGUMENT: no cyanosis, no rash  NEURO:  no asterixis   Access none    Labs:   All labs reviewed by me  Electrolytes/Renal - Recent Labs   Lab Test 08/01/22  1537 08/01/22  1218 08/01/22  0832 07/31/22  0942 07/31/22  0622 07/30/22  0838 07/30/22  0350   NA  --   --  140  --  138  --  142   POTASSIUM  --   --  4.2  --  4.5  --  4.5   CHLORIDE  --   --  91*  --  89*  --  91*   CO2  --   --  47*  --  46*  --  49*   BUN  --   --  80.2*  --  88.3*  --  84.2*   CR  --   --  1.67*  --  1.86*  --  1.61*   * 122* 144*   < > 152*   < > 185*   ESTUARDO  --   --  9.0  --  8.9  --  9.1   MAG  --   --  2.7*  --  2.7*  --  2.6*   PHOS  --   --  4.7*  --  5.2*  --  4.8*    < > = values in this interval not displayed.       CBC -   Recent Labs   Lab Test 07/31/22  0622 07/30/22  0350 07/29/22  0446   WBC 6.3 5.9 4.9   HGB 8.0* 6.7* 7.0*    244 265       LFTs -   Recent Labs   Lab Test 08/01/22  0832 07/28/22  0925 07/25/22  0648   ALKPHOS 68 60 61   BILITOTAL 0.2 0.2 0.2   ALT 12 12 15   AST 22 17 19   PROTTOTAL 5.3* 5.2* 4.7*   ALBUMIN 2.8* 3.0* 2.6*       Iron Panel -   Recent Labs   Lab Test 07/21/22  0122   IRON 31*   IRONSAT 11*   CARLOS 189         Imaging:  All imaging studies reviewed by me.     Current Medications:    acetaminophen  975 mg Oral or Feeding Tube TID     acetylcysteine  2 mL Nebulization TID     [Held by provider] aspirin  81 mg Oral Daily     calcium carbonate 600 mg-vitamin D 400 units  1 tablet Per Feeding Tube BID w/meals     dapsone  50 mg Oral or Feeding Tube Once per day on Mon Wed Fri     insulin aspart  1-12 Units Subcutaneous Q4H     insulin glargine  15 Units Subcutaneous BID     levalbuterol  1.25 mg Nebulization TID     metoprolol tartrate   25 mg Per Feeding Tube BID     multivitamin, therapeutic  1 tablet Per Feeding Tube Daily     mycophenolate  500 mg Oral or Feeding Tube BID     nystatin   Topical BID     nystatin  1,000,000 Units Swish & Swallow 4x Daily     ondansetron  4 mg Oral Daily     pantoprazole  40 mg Oral or Feeding Tube BID     predniSONE  12.5 mg Oral or Feeding Tube BID     protein modular  1 packet Per Feeding Tube Daily     sodium chloride (PF)  10 mL Intracatheter Q8H     sodium chloride (PF)  10 mL Intracatheter Q8H     sodium chloride (PF)  3 mL Intracatheter Q8H     tacrolimus  2 mg Per G Tube QAM    And     tacrolimus  2 mg Per G Tube QPM     thiamine  100 mg Oral or Feeding Tube Daily     valGANciclovir  450 mg Oral or Feeding Tube Once per day on Mon Wed Fri       dextrose Stopped (07/15/22 1801)     heparin 800 Units/hr (08/01/22 1600)     Glenn Alanis MD

## 2022-08-02 ENCOUNTER — APPOINTMENT (OUTPATIENT)
Dept: PHYSICAL THERAPY | Facility: CLINIC | Age: 62
DRG: 007 | End: 2022-08-02
Attending: THORACIC SURGERY (CARDIOTHORACIC VASCULAR SURGERY)
Payer: MEDICARE

## 2022-08-02 ENCOUNTER — APPOINTMENT (OUTPATIENT)
Dept: GENERAL RADIOLOGY | Facility: CLINIC | Age: 62
DRG: 007 | End: 2022-08-02
Attending: PHYSICIAN ASSISTANT
Payer: MEDICARE

## 2022-08-02 LAB
ANION GAP SERPL CALCULATED.3IONS-SCNC: <1 MMOL/L (ref 7–15)
BASE EXCESS BLDV CALC-SCNC: 17.4 MMOL/L (ref -7.7–1.9)
BASOPHILS # BLD MANUAL: 0.1 10E3/UL (ref 0–0.2)
BASOPHILS NFR BLD MANUAL: 1 %
BRONCHOSCOPY: NORMAL
BUN SERPL-MCNC: 76 MG/DL (ref 8–23)
CALCIUM SERPL-MCNC: 9 MG/DL (ref 8.8–10.2)
CHLORIDE SERPL-SCNC: 94 MMOL/L (ref 98–107)
CREAT SERPL-MCNC: 1.4 MG/DL (ref 0.51–0.95)
DEPRECATED HCO3 PLAS-SCNC: 47 MMOL/L (ref 22–29)
EOSINOPHIL # BLD MANUAL: 0.1 10E3/UL (ref 0–0.7)
EOSINOPHIL NFR BLD MANUAL: 2 %
ERYTHROCYTE [DISTWIDTH] IN BLOOD BY AUTOMATED COUNT: 18.5 % (ref 10–15)
GFR SERPL CREATININE-BSD FRML MDRD: 43 ML/MIN/1.73M2
GLUCOSE BLDC GLUCOMTR-MCNC: 123 MG/DL (ref 70–99)
GLUCOSE BLDC GLUCOMTR-MCNC: 141 MG/DL (ref 70–99)
GLUCOSE BLDC GLUCOMTR-MCNC: 155 MG/DL (ref 70–99)
GLUCOSE BLDC GLUCOMTR-MCNC: 201 MG/DL (ref 70–99)
GLUCOSE BLDC GLUCOMTR-MCNC: 89 MG/DL (ref 70–99)
GLUCOSE BLDC GLUCOMTR-MCNC: 89 MG/DL (ref 70–99)
GLUCOSE SERPL-MCNC: 96 MG/DL (ref 70–99)
HCO3 BLDV-SCNC: 46 MMOL/L (ref 21–28)
HCT VFR BLD AUTO: 25.4 % (ref 35–47)
HGB BLD-MCNC: 7.4 G/DL (ref 11.7–15.7)
HGB BLD-MCNC: 7.5 G/DL (ref 11.7–15.7)
HOLD SPECIMEN: NORMAL
LYMPHOCYTES # BLD MANUAL: 0.2 10E3/UL (ref 0.8–5.3)
LYMPHOCYTES NFR BLD MANUAL: 4 %
MAGNESIUM SERPL-MCNC: 2.6 MG/DL (ref 1.7–2.3)
MCH RBC QN AUTO: 30.6 PG (ref 26.5–33)
MCHC RBC AUTO-ENTMCNC: 29.5 G/DL (ref 31.5–36.5)
MCV RBC AUTO: 104 FL (ref 78–100)
MONOCYTES # BLD MANUAL: 0.3 10E3/UL (ref 0–1.3)
MONOCYTES NFR BLD MANUAL: 5 %
NEUTROPHILS # BLD MANUAL: 4.8 10E3/UL (ref 1.6–8.3)
NEUTROPHILS NFR BLD MANUAL: 88 %
NRBC # BLD AUTO: 0.1 10E3/UL
NRBC BLD MANUAL-RTO: 2 %
O2/TOTAL GAS SETTING VFR VENT: 35 %
PCO2 BLDV: 81 MM HG (ref 40–50)
PH BLDV: 7.36 [PH] (ref 7.32–7.43)
PHOSPHATE SERPL-MCNC: 4.1 MG/DL (ref 2.5–4.5)
PLAT MORPH BLD: ABNORMAL
PLATELET # BLD AUTO: 216 10E3/UL (ref 150–450)
PO2 BLDV: 38 MM HG (ref 25–47)
POTASSIUM SERPL-SCNC: 4.6 MMOL/L (ref 3.4–5.3)
RBC # BLD AUTO: 2.45 10E6/UL (ref 3.8–5.2)
RBC MORPH BLD: ABNORMAL
SODIUM SERPL-SCNC: 141 MMOL/L (ref 136–145)
UFH PPP CHRO-ACNC: 0.48 IU/ML
UFH PPP CHRO-ACNC: <0.1 IU/ML
WBC # BLD AUTO: 5.5 10E3/UL (ref 4–11)

## 2022-08-02 PROCEDURE — 85007 BL SMEAR W/DIFF WBC COUNT: CPT | Performed by: STUDENT IN AN ORGANIZED HEALTH CARE EDUCATION/TRAINING PROGRAM

## 2022-08-02 PROCEDURE — 85520 HEPARIN ASSAY: CPT | Performed by: INTERNAL MEDICINE

## 2022-08-02 PROCEDURE — 31622 DX BRONCHOSCOPE/WASH: CPT | Performed by: INTERNAL MEDICINE

## 2022-08-02 PROCEDURE — 250N000013 HC RX MED GY IP 250 OP 250 PS 637: Performed by: HOSPITALIST

## 2022-08-02 PROCEDURE — C9113 INJ PANTOPRAZOLE SODIUM, VIA: HCPCS | Performed by: INTERNAL MEDICINE

## 2022-08-02 PROCEDURE — 0BJ08ZZ INSPECTION OF TRACHEOBRONCHIAL TREE, VIA NATURAL OR ARTIFICIAL OPENING ENDOSCOPIC: ICD-10-PCS | Performed by: INTERNAL MEDICINE

## 2022-08-02 PROCEDURE — 250N000009 HC RX 250: Performed by: PHYSICIAN ASSISTANT

## 2022-08-02 PROCEDURE — 99232 SBSQ HOSP IP/OBS MODERATE 35: CPT | Mod: GC | Performed by: INTERNAL MEDICINE

## 2022-08-02 PROCEDURE — 99233 SBSQ HOSP IP/OBS HIGH 50: CPT | Performed by: INTERNAL MEDICINE

## 2022-08-02 PROCEDURE — 250N000013 HC RX MED GY IP 250 OP 250 PS 637: Performed by: STUDENT IN AN ORGANIZED HEALTH CARE EDUCATION/TRAINING PROGRAM

## 2022-08-02 PROCEDURE — 97110 THERAPEUTIC EXERCISES: CPT | Mod: GP

## 2022-08-02 PROCEDURE — 250N000012 HC RX MED GY IP 250 OP 636 PS 637: Performed by: SURGERY

## 2022-08-02 PROCEDURE — 250N000011 HC RX IP 250 OP 636: Performed by: HOSPITALIST

## 2022-08-02 PROCEDURE — 94640 AIRWAY INHALATION TREATMENT: CPT

## 2022-08-02 PROCEDURE — 999N000099 HC STATISTIC MODERATE SEDATION < 10 MIN: Performed by: INTERNAL MEDICINE

## 2022-08-02 PROCEDURE — 82803 BLOOD GASES ANY COMBINATION: CPT | Performed by: STUDENT IN AN ORGANIZED HEALTH CARE EDUCATION/TRAINING PROGRAM

## 2022-08-02 PROCEDURE — 80048 BASIC METABOLIC PNL TOTAL CA: CPT | Performed by: STUDENT IN AN ORGANIZED HEALTH CARE EDUCATION/TRAINING PROGRAM

## 2022-08-02 PROCEDURE — 36592 COLLECT BLOOD FROM PICC: CPT | Performed by: STUDENT IN AN ORGANIZED HEALTH CARE EDUCATION/TRAINING PROGRAM

## 2022-08-02 PROCEDURE — 94668 MNPJ CHEST WALL SBSQ: CPT

## 2022-08-02 PROCEDURE — 85027 COMPLETE CBC AUTOMATED: CPT | Performed by: STUDENT IN AN ORGANIZED HEALTH CARE EDUCATION/TRAINING PROGRAM

## 2022-08-02 PROCEDURE — 120N000002 HC R&B MED SURG/OB UMMC

## 2022-08-02 PROCEDURE — 250N000013 HC RX MED GY IP 250 OP 250 PS 637: Performed by: SURGERY

## 2022-08-02 PROCEDURE — 250N000011 HC RX IP 250 OP 636: Performed by: INTERNAL MEDICINE

## 2022-08-02 PROCEDURE — 97530 THERAPEUTIC ACTIVITIES: CPT | Mod: GP

## 2022-08-02 PROCEDURE — 94660 CPAP INITIATION&MGMT: CPT

## 2022-08-02 PROCEDURE — 71046 X-RAY EXAM CHEST 2 VIEWS: CPT

## 2022-08-02 PROCEDURE — 83735 ASSAY OF MAGNESIUM: CPT | Performed by: STUDENT IN AN ORGANIZED HEALTH CARE EDUCATION/TRAINING PROGRAM

## 2022-08-02 PROCEDURE — 99222 1ST HOSP IP/OBS MODERATE 55: CPT | Mod: 24 | Performed by: INTERNAL MEDICINE

## 2022-08-02 PROCEDURE — 250N000013 HC RX MED GY IP 250 OP 250 PS 637: Performed by: THORACIC SURGERY (CARDIOTHORACIC VASCULAR SURGERY)

## 2022-08-02 PROCEDURE — 250N000012 HC RX MED GY IP 250 OP 636 PS 637: Performed by: PHYSICIAN ASSISTANT

## 2022-08-02 PROCEDURE — 71046 X-RAY EXAM CHEST 2 VIEWS: CPT | Mod: 26 | Performed by: STUDENT IN AN ORGANIZED HEALTH CARE EDUCATION/TRAINING PROGRAM

## 2022-08-02 PROCEDURE — 94640 AIRWAY INHALATION TREATMENT: CPT | Mod: 76

## 2022-08-02 PROCEDURE — 85018 HEMOGLOBIN: CPT | Performed by: INTERNAL MEDICINE

## 2022-08-02 PROCEDURE — 250N000013 HC RX MED GY IP 250 OP 250 PS 637: Performed by: NURSE PRACTITIONER

## 2022-08-02 PROCEDURE — 250N000012 HC RX MED GY IP 250 OP 636 PS 637: Performed by: INTERNAL MEDICINE

## 2022-08-02 PROCEDURE — 84100 ASSAY OF PHOSPHORUS: CPT | Performed by: STUDENT IN AN ORGANIZED HEALTH CARE EDUCATION/TRAINING PROGRAM

## 2022-08-02 PROCEDURE — 250N000009 HC RX 250: Performed by: INTERNAL MEDICINE

## 2022-08-02 PROCEDURE — 999N000157 HC STATISTIC RCP TIME EA 10 MIN

## 2022-08-02 PROCEDURE — 36592 COLLECT BLOOD FROM PICC: CPT | Performed by: INTERNAL MEDICINE

## 2022-08-02 PROCEDURE — 99233 SBSQ HOSP IP/OBS HIGH 50: CPT | Mod: 24 | Performed by: NURSE PRACTITIONER

## 2022-08-02 RX ORDER — VALGANCICLOVIR HYDROCHLORIDE 50 MG/ML
450 POWDER, FOR SOLUTION ORAL DAILY
Status: DISCONTINUED | OUTPATIENT
Start: 2022-08-03 | End: 2022-08-09

## 2022-08-02 RX ORDER — FENTANYL CITRATE 50 UG/ML
INJECTION, SOLUTION INTRAMUSCULAR; INTRAVENOUS PRN
Status: COMPLETED | OUTPATIENT
Start: 2022-08-02 | End: 2022-08-02

## 2022-08-02 RX ORDER — LIDOCAINE HYDROCHLORIDE 40 MG/ML
INJECTION, SOLUTION RETROBULBAR PRN
Status: COMPLETED | OUTPATIENT
Start: 2022-08-02 | End: 2022-08-02

## 2022-08-02 RX ORDER — LIDOCAINE HYDROCHLORIDE 20 MG/ML
INJECTION, SOLUTION INFILTRATION; PERINEURAL PRN
Status: COMPLETED | OUTPATIENT
Start: 2022-08-02 | End: 2022-08-02

## 2022-08-02 RX ORDER — LIDOCAINE HYDROCHLORIDE 10 MG/ML
INJECTION, SOLUTION INFILTRATION; PERINEURAL PRN
Status: COMPLETED | OUTPATIENT
Start: 2022-08-02 | End: 2022-08-02

## 2022-08-02 RX ADMIN — PREDNISONE 12.5 MG: 2.5 TABLET ORAL at 08:56

## 2022-08-02 RX ADMIN — LEVALBUTEROL HYDROCHLORIDE 1.25 MG: 1.25 SOLUTION RESPIRATORY (INHALATION) at 21:11

## 2022-08-02 RX ADMIN — Medication 1 PACKET: at 13:08

## 2022-08-02 RX ADMIN — Medication 40 MG: at 20:09

## 2022-08-02 RX ADMIN — LIDOCAINE HYDROCHLORIDE 9 ML: 40 INJECTION, SOLUTION RETROBULBAR; TOPICAL at 11:08

## 2022-08-02 RX ADMIN — THIAMINE HCL TAB 100 MG 100 MG: 100 TAB at 09:00

## 2022-08-02 RX ADMIN — MIDAZOLAM 0.5 MG: 1 INJECTION INTRAMUSCULAR; INTRAVENOUS at 11:00

## 2022-08-02 RX ADMIN — OXYCODONE HYDROCHLORIDE 10 MG: 5 TABLET ORAL at 14:30

## 2022-08-02 RX ADMIN — HYDROXYZINE HYDROCHLORIDE 50 MG: 25 TABLET ORAL at 19:57

## 2022-08-02 RX ADMIN — LEVALBUTEROL HYDROCHLORIDE 1.25 MG: 1.25 SOLUTION RESPIRATORY (INHALATION) at 09:40

## 2022-08-02 RX ADMIN — CALCIUM CARBONATE 600 MG (1,500 MG)-VITAMIN D3 400 UNIT TABLET 1 TABLET: at 17:28

## 2022-08-02 RX ADMIN — LIDOCAINE HYDROCHLORIDE 5 ML: 20 INJECTION, SOLUTION INFILTRATION; PERINEURAL at 11:09

## 2022-08-02 RX ADMIN — NYSTATIN: 100000 CREAM TOPICAL at 08:58

## 2022-08-02 RX ADMIN — TACROLIMUS 2 MG: 5 CAPSULE ORAL at 08:59

## 2022-08-02 RX ADMIN — ACETAMINOPHEN 975 MG: 325 TABLET, FILM COATED ORAL at 19:57

## 2022-08-02 RX ADMIN — LEVALBUTEROL HYDROCHLORIDE 1.25 MG: 1.25 SOLUTION RESPIRATORY (INHALATION) at 14:33

## 2022-08-02 RX ADMIN — PANTOPRAZOLE SODIUM 40 MG: 40 INJECTION, POWDER, FOR SOLUTION INTRAVENOUS at 19:58

## 2022-08-02 RX ADMIN — FENTANYL CITRATE 25 MCG: 50 INJECTION, SOLUTION INTRAMUSCULAR; INTRAVENOUS at 11:00

## 2022-08-02 RX ADMIN — THERA TABS 1 TABLET: TAB at 13:08

## 2022-08-02 RX ADMIN — INSULIN GLARGINE 15 UNITS: 100 INJECTION, SOLUTION SUBCUTANEOUS at 09:03

## 2022-08-02 RX ADMIN — ACETYLCYSTEINE 2 ML: 200 INHALANT RESPIRATORY (INHALATION) at 14:33

## 2022-08-02 RX ADMIN — MIDAZOLAM 0.5 MG: 1 INJECTION INTRAMUSCULAR; INTRAVENOUS at 10:57

## 2022-08-02 RX ADMIN — NYSTATIN: 100000 CREAM TOPICAL at 20:08

## 2022-08-02 RX ADMIN — NYSTATIN 1000000 UNITS: 100000 SUSPENSION ORAL at 17:28

## 2022-08-02 RX ADMIN — NYSTATIN 1000000 UNITS: 100000 SUSPENSION ORAL at 22:03

## 2022-08-02 RX ADMIN — NYSTATIN 1000000 UNITS: 100000 SUSPENSION ORAL at 13:08

## 2022-08-02 RX ADMIN — INSULIN ASPART 1 UNITS: 100 INJECTION, SOLUTION INTRAVENOUS; SUBCUTANEOUS at 20:16

## 2022-08-02 RX ADMIN — PREDNISONE 12.5 MG: 2.5 TABLET ORAL at 19:57

## 2022-08-02 RX ADMIN — MYCOPHENOLATE MOFETIL 500 MG: 200 POWDER, FOR SUSPENSION ORAL at 19:57

## 2022-08-02 RX ADMIN — OXYCODONE HYDROCHLORIDE 10 MG: 5 TABLET ORAL at 05:53

## 2022-08-02 RX ADMIN — CALCIUM CARBONATE 600 MG (1,500 MG)-VITAMIN D3 400 UNIT TABLET 1 TABLET: at 09:00

## 2022-08-02 RX ADMIN — ACETAMINOPHEN 975 MG: 325 TABLET, FILM COATED ORAL at 08:56

## 2022-08-02 RX ADMIN — LIDOCAINE HYDROCHLORIDE 12 ML: 10 INJECTION, SOLUTION INFILTRATION; PERINEURAL at 11:08

## 2022-08-02 RX ADMIN — ONDANSETRON 4 MG: 4 TABLET, ORALLY DISINTEGRATING ORAL at 08:56

## 2022-08-02 RX ADMIN — TACROLIMUS 2 MG: 5 CAPSULE ORAL at 17:28

## 2022-08-02 RX ADMIN — METOPROLOL TARTRATE 25 MG: 25 TABLET, FILM COATED ORAL at 08:57

## 2022-08-02 RX ADMIN — INSULIN ASPART 3 UNITS: 100 INJECTION, SOLUTION INTRAVENOUS; SUBCUTANEOUS at 04:55

## 2022-08-02 RX ADMIN — FENTANYL CITRATE 25 MCG: 50 INJECTION, SOLUTION INTRAMUSCULAR; INTRAVENOUS at 10:57

## 2022-08-02 RX ADMIN — MYCOPHENOLATE MOFETIL 500 MG: 200 POWDER, FOR SUSPENSION ORAL at 08:58

## 2022-08-02 RX ADMIN — PROCHLORPERAZINE MALEATE 10 MG: 5 TABLET ORAL at 19:57

## 2022-08-02 RX ADMIN — NYSTATIN 1000000 UNITS: 100000 SUSPENSION ORAL at 08:57

## 2022-08-02 RX ADMIN — OXYCODONE HYDROCHLORIDE 10 MG: 5 TABLET ORAL at 19:57

## 2022-08-02 RX ADMIN — OXYCODONE HYDROCHLORIDE 10 MG: 5 TABLET ORAL at 02:15

## 2022-08-02 RX ADMIN — METOPROLOL TARTRATE 25 MG: 25 TABLET, FILM COATED ORAL at 19:57

## 2022-08-02 RX ADMIN — Medication 40 MG: at 09:00

## 2022-08-02 RX ADMIN — ACETYLCYSTEINE 2 ML: 200 INHALANT RESPIRATORY (INHALATION) at 09:40

## 2022-08-02 RX ADMIN — ACETAMINOPHEN 975 MG: 325 TABLET, FILM COATED ORAL at 13:08

## 2022-08-02 RX ADMIN — ACETYLCYSTEINE 2 ML: 200 INHALANT RESPIRATORY (INHALATION) at 21:11

## 2022-08-02 ASSESSMENT — ACTIVITIES OF DAILY LIVING (ADL)
ADLS_ACUITY_SCORE: 29
ADLS_ACUITY_SCORE: 30
ADLS_ACUITY_SCORE: 31
ADLS_ACUITY_SCORE: 30

## 2022-08-02 NOTE — PROGRESS NOTES
Pulmonary Medicine  Cystic Fibrosis - Lung Transplant Team  Progress Note  2022       Patient: Sofie Rodriguez  MRN: [id number]  : 1960 (age 62 year old)  Transplant: 2022 (Lung), POD#35  Admission date: 2022    Assessment & Plan:     Sofie Rodriguez is a 62 year old female with a PMH significant for end-stage COPD, HTN, HFpEF, Mycobacterium peregrinum colonization, h/o hepatitis C, HECTOR s/p LEEP procedure, osteopenia, and former methamphetamine use.  Pt. is now s/p BSLT on 22, lungs slightly undersized.   Persistent low dose pressor needs post-op through 7/10.  Extubated POD #2 but with persistent hypercapnia, mostly compensated and only slightly improved with intermittent BiPAP.  Also with left radial artery thrombus (presumed secondary to arterial line) s/p thrombectomy /, BACILIO, and C diff.  Rehabilitation and airway clearance initially limited by dysrhythmia and gastric discomfort, now with gradual improvement.  S/p GJ tube placement in IR .      Today's recommendations:  - Wean supplemental O2 as able  - Plan to clamp left chest tube tomorrow (right chest tube managed by CVTS, output remains moderate)  - Diurese as able, per primary team (small fluid bolus yesterday per nephrology)  - Repeat inspection bronch this morning to evaluate anastomoses  - Repeat tacro level tomorrow for steady state (ordered)  - Prednisone taper due 8/4  - G tube to gravity drainage as much as needed for gastric symptoms of nausea, bloating, or abdominal discomfort     S/p bilateral sequential lung transplant (BSLT) for end stage COPD:  Persistent hypercapneic respiratory failure:   Persistent hypotension, Resolved:   Bilateral hydroPTX:  Right hemidiaphragm palsy: Explant pathology with severe emphysema with subpleural bullae formation, changes of chronic bronchitis, subpleural scars and patchy pulmonary edema; benign hilar lymph nodes.  No evidence of PGD post-op.  Extubated .   Persistent hypercapnia without dyspnea, appears to be a respiratory drive problem.  Persistent low dose pressors weaned off 7/10 and scheduled midodrine topped 7/19.  SNIFF (7/14) notable for right hemidiaphragm palsy.  Bronch (7/19) with slight graying of mucosa noted at right anastomosis and scant secretions (slightly increased in RLL).  Chest CT (7/23) with increased loculated fluid within the left hemithorax with specks of air density in the loculated fluid, similar appearing loculated right hydroPTX with minimal decrease in fluid component (right chest tube appears to be outside the loculated hydroPTX), increased size of pleural based lesion in MARLON, and mild subcutaneous emphysema along right chest tube with minimal along tract of removed left chest tube.  S/p left apical chest tube placed by IR 7/25, exudative (right surgical tube also remains, output moderate).  NIPPV overnight for persistent hypercapnia, stopped given lack of improvement with therapy, compensated hypercapnia persists.  On 0.5-2L NC.   - Ammonia monitoring twice weekly (screening for hyperammonemia post-lung transplant)  - Nebs: levalbuterol and Mucomyst TID  - Pulmonary toilet with chest physiotherapy TID (decreased 7/28)  - Aerobika and incentive spirometry hourly while awake  - Supplemental oxygen as needed to maintain SpO2 >92% (wean as able)  - Daily VBG given persistent hypercapnia (stable)  - Right chest tube (managed by surgical team) and left apical chest tube (placed by IR), remain with low-moderate output, plan to clamp left chest tube tomorrow  - CXR daily while chest tubes remain, pending from today  - Volume management per primary team, diurese as able (small fluid bolus yesterday per nephrology)  - Repeat inspection bronch this morning to evaluate anastomoses     Immunosuppression:  Induction therapy with basiliximab (and high dose IV steroid).  - Tacrolimus 2 mg BID.  Goal level 8-12.  Recently supratherapeutic, recent level  on 8/1 stable.  Repeat level tomorrow for steady state (ordered).   -  mg BID (decreased 7/27, GI symptoms/leukopenia)  - Prednisone 12.5 mg BID, next taper due 8/4 (not yet ordered)  Date AM dose (mg) PM dose (mg)   7/28/22 12.5 12.5   8/4/22 12.5 10   8/18/22 10 10   9/15/22 10 7.5   10/13/22 7.5 7.5   11/10/22 7.5 5   12/8/22 5 5   1/5/23 5 2.5      Prophylaxis:   - Dapsone qMWF for PJP ppx (7/18), defer increasing to daily pending hgb stability (once consistently >8)  - VGCV for CMV ppx, CMV negative 7/25  - Nystatin for oral candidiasis ppx, 6 month course  - See below for serologies and viral ppx:    Donor Recipient Intervention   CMV status Positive Positive Valganciclovir POD #8-90   EBV status Positive Positive EBV monitoring as below   HSV status N/A Positive Not indicated      ID:  H/o M. peregrinum colonization: Donor bronch cultures (OSH) with Strep beta hemolytic (not group A).  Recipient cultures as below.  Bilateral pleural cultures (right 7/20, left 7/25) NGTD.  - AFB to be sent on all future bronchs (NGTD post-transplant)     Streptococcus pneumoniae:  Stenotrophomonas maltophilia: Noted in recipient cultures at time of transplant.  S/p ceftazidime 6/28-7/10, vancomycin 7/7-7/8 and 6/28-6/30, and levofloxacin 7/10-7/12 for total 2 week ABX course.     Hypogammaglobulinemia: IgG adequate at time of transplant, repeat level low (336) on 7/28.  S/p IVIG dosing with premedication on 7/30, tolerated well.  - Repeat IgG in one month (8/28)     H/o hepatitis C:  Diagnosed in 1980s s/p 2m treatment, quant negative since 10/2017, last positive 2/20/17 ([phone]).  Ab positive on 6/2021 with negative HCV PCR.  H/o remote EtOH abuse.  MR elastography (4/27/21) with hepatology review and consult without any concerns post transplant.  Hepatitis C RNA negative and hepatitis C antibody positive (old) on 6/28.    EBV viremia: EBV level 11k, log 4.1 on 7/28.  Not likely to be clinically significant.  -  Monitor monthly (due 8/28)     Other relevant problems managed by primary team:      SVT:   Aflutter with RVR: SVT first noted on 7/14, prior to HD line placement.  Continues intermittently.  Aflutter with RVR to 200 7/17 triggered by activity.  EP consulted 7/17 given persistent tachycardia/dysrhythmia.  Midodrine held 7/19.  - Metoprolol per primary team (started 7/15)  - Heparin drip (7/17), defer transition to DOAC at this time given chest tubes     Left hand ischemia: Radial artery thrombosis identified on duplex doppler.  S/p thrombectomy on 7/2.   - AC per primary      BACILIO:   Hyperkalemia: Likely multifactorial including medications (Bactrim, tacrolimus) and hypotension.  Fludrocortisone 7/6-7/11.  Potassium now stable.  S/p non-tunneled HD line 7/14.  Initial iHD run 7/14 limited by afib with RVR vs SVT.  Last iHD run 7/16, line removed 7/22.  Creatinine increased since 7/29 with diamox and elevated tacro level.   - Tacrolimus monitoring as above  - Management per nephrology and primary team     Abdominal pain: Noted 7/15, cramping pain.  ACR with large stool burden in colon, no obstruction or distention.  Some nausea but no emesis.  CT abdomen (7/15) with moderate to large gastric distention, otherwise without obstruction.  NG placed for LIS with moderate to large output for several days.  Increased abdominal pain 7/17 after clamping for 4 hours, tolerated clamping today without issue.  Gastric emptying study (7/20) with severe gastric emptying delay (95% retention at 4 hours).  S/p GJ tube placement in IR 7/27.  - Simethicone prn  - TF management per RD and primary team  - TF via J tube, G tube to gravity drainage as much as needed for gastric symptoms of nausea, bloating, or abdominal discomfort     C diff infection: Abdominal pain with diarrhea noted 7/8, AXR without dilated bowel, moderate colonic stool burden.  C diff positive 7/11.  Loose stools (on tube feeds) stable.  S/p PO  vancomycin (7/11-7/28).  - Low threshold to resume in the future with ABX course     Hypomagnesemia: Suppressed absorption d/t CNI.   - Continue daily magnesium with replacement protocol prn, schedule oral magnesium supplementation if needed pending improvement in stools     Anemia: Suspect related to blood draws. S/p 1 unit PRBCs on 7/30.  Stable.     We appreciate the excellent care provided by the [place] [id number] team.  Recommendations communicated via in person rounding and this note.  Will continue to follow along closely, please do not hesitate to call with any questions or concerns.    Patient discussed with Dr. Paredes.    Catherine Johnson, DNP, APRN, CNP  Inpatient Nurse Practitioner  Pulmonary CF/Transplant     Subjective & Interval History:     Remains on 2L NC but with high SpO2 likely able to wean down or off.  Denies dyspnea, minimal cough and sputum.  Slept poorly last night d/t loud laughing and talking by staff outside her room for much of the night.  Some pain at right chest tube site.  No nausea, loose stools stable at 2-3 daily.  Moderate abdominal distention noted since G tube was inadvertently clamped this morning by nursing at the time TF was stopped for NPO for bronch today.    Review of Systems:     C: No fever, no chills, no change in weight  INTEGUMENTARY/SKIN: No rash or obvious new lesions  ENT/MOUTH: No sore throat, no sinus pain, no nasal congestion or drainage  RESP: See interval history  CV: No chest pain, no palpitations, no peripheral edema, no orthopnea  GI: No nausea, no vomiting,  no reflux symptoms  : No dysuria  MUSCULOSKELETAL: See interval history  ENDOCRINE: Blood sugars intermittently elevated  NEURO: No headache, no numbness or tingling  PSYCHIATRIC: Mood stable    Physical Exam:     All notes, images, and labs from past 24 hours (at minimum) were reviewed.    Vital signs:  Temp: 98.5  F (36.9  C) Temp src: Oral BP: 106/68 Pulse: 104   Resp: 16 SpO2: 97 % O2 Device: Nasal  "cannula Oxygen Delivery: 2 LPM Height: 157.5 cm (5' 2\") Weight: 70 kg (154 lb 5.2 oz)  I/O:     Intake/Output Summary (Last 24 hours) at 8/2/2022 1537  Last data filed at 8/2/2022 1307  Gross per 24 hour   Intake 899 ml   Output 1870 ml   Net -971 ml     Constitutional: Sitting up in a chair, in no apparent distress.   HEENT: Eyes with pink conjunctivae, anicteric.  Oral mucosa moist without lesions.  PULM: Mildly diminished air flow bilaterally.  No crackles, no rhonchi, no wheezes.  Non-labored breathing on 2L NC.  CV: Normal S1 and S2.  RRR.  No murmur, gallop, or rub.  No peripheral edema.   ABD: NABS, soft, nontender, nondistended.  PEG/J tube not visualized.  MSK: Moves all extremities.  No apparent muscle wasting.   NEURO: Alert and oriented, conversant.   SKIN: Warm, dry.  No rash on limited exam.   PSYCH: Mood stable.     Lines, Drains, and Devices:  Peripheral IV 07/20/22 Anterior;Right Lower forearm (Active)   Site Assessment WD 08/02/22 1200   Line Status Saline locked 08/02/22 1200   Dressing Intervention New dressing  07/20/22 1548   Phlebitis Scale 0-->no symptoms 08/02/22 1200   Infiltration Scale 0 08/02/22 1200   Number of days: 13       Midline Catheter Double Lumen (Active)   Site Assessment [facility] 08/02/22 1200   External Cath Length (cm) 2 cm 07/28/22 1455   Initial Extremity Circumference (cm) 29 cm 07/28/22 1455   Dressing Intervention Chlorhexidine patch;Transparent 08/02/22 0800   Line Necessity Yes, meets criteria 08/02/22 1200   Dressing Change Due 08/04/22 08/02/22 0400   Purple - Status infusing 08/02/22 1200   Purple - Cap Change Due 08/03/22 08/02/22 0400   Red - Status saline locked 08/02/22 1200   Red - Cap Change Due 08/03/22 08/02/22 0400   Extravasation? No 08/02/22 0800   Number of days: 5     Data:     LABS    CMP:   Recent Labs   Lab 08/02/22  1526 08/02/22  1310 08/02/22  1011 08/02/22  0902 08/02/22  0757 08/01/22  1218 08/01/22  0832 07/31/22  0942 07/31/22  0622 " 07/30/22  0838 07/30/22  0350 07/28/22  1049 07/28/22  0925   NA  --   --   --   --  141  --  140  --  138  --  142   < > 144   POTASSIUM  --   --   --   --  4.6  --  4.2  --  4.5  --  4.5   < > 4.1   CHLORIDE  --   --   --   --  94*  --  91*  --  89*  --  91*   < > 90*   CO2  --   --   --   --  47*  --  47*  --  46*  --  49*   < > >50*   ANIONGAP  --   --   --   --  <1*  --  2*  --  3*  --  2*   < >  --    * 141* 89 89 96   < > 144*   < > 152*   < > 185*   < > 120*   BUN  --   --   --   --  76.0*  --  80.2*  --  88.3*  --  84.2*   < > 88.1*   CR  --   --   --   --  1.40*  --  1.67*  --  1.86*  --  1.61*   < > 1.44*   GFRESTIMATED  --   --   --   --  43*  --  35*  --  30*  --  36*   < > 41*   ESTUARDO  --   --   --   --  9.0  --  9.0  --  8.9  --  9.1   < > 9.6   MAG  --   --   --   --  2.6*  --  2.7*  --  2.7*  --  2.6*   < > 2.5*   PHOS  --   --   --   --  4.1  --  4.7*  --  5.2*  --  4.8*   < > 3.8   PROTTOTAL  --   --   --   --   --   --  5.3*  --   --   --   --   --  5.2*   ALBUMIN  --   --   --   --   --   --  2.8*  --   --   --   --   --  3.0*   BILITOTAL  --   --   --   --   --   --  0.2  --   --   --   --   --  0.2   ALKPHOS  --   --   --   --   --   --  68  --   --   --   --   --  60   AST  --   --   --   --   --   --  22  --   --   --   --   --  17   ALT  --   --   --   --   --   --  12  --   --   --   --   --  12    < > = values in this interval not displayed.     CBC:   Recent Labs   Lab 08/02/22  1311 08/02/22  0757 07/31/22  0622 07/30/22  0350 07/29/22  0446   WBC  --  5.5 6.3 5.9 4.9   RBC  --  2.45* 2.61* 2.08* 2.19*   HGB 7.4* 7.5* 8.0* 6.7* 7.0*   HCT  --  25.4* 26.7* 22.4* 23.8*   MCV  --  104* 102* 108* 109*   MCH  --  30.6 30.7 32.2 32.0   MCHC  --  29.5* 30.0* 29.9* 29.4*   RDW  --  18.5* 19.1* 16.3* 16.3*   PLT  --  216 252 244 265       INR:   Recent Labs   Lab 07/27/22  0646   INR 0.87       Glucose:   Recent Labs   Lab 08/02/22  1526 08/02/22  1310 08/02/22  1011 08/02/22  0902  08/02/22  0757 08/02/22  0451   * 141* 89 89 96 201*       Blood Gas:   Recent Labs   Lab 08/02/22  0757 08/01/22  0832 07/31/22  0622   PHV 7.36 7.42 7.40   PCO2V 81* 75* 78*   PO2V 38 34 36   HCO3V 46* 49* 48*   LINDY 17.4* 21.8* 20.3*   O2PER 35 35 28       Culture Data No results for input(s): CULT in the last 168 hours.    Virology Data:   Lab Results   Component Value Date    FLUAH1 Not Detected 06/29/2022    FLUAH3 Not Detected 06/29/2022    WA3294 Not Detected 06/29/2022    IFLUB Not Detected 06/29/2022    RSVA Not Detected 06/29/2022    RSVB Not Detected 06/29/2022    PIV1 Not Detected 06/29/2022    PIV2 Not Detected 06/29/2022    PIV3 Not Detected 06/29/2022    HMPV Not Detected 06/29/2022       Historical CMV results (last 3 of prior testing):  Lab Results   Component Value Date    CMVQNT Not Detected 07/25/2022     No results found for: CMVLOG    Urine Studies    Recent Labs   Lab Test 08/01/22  0319 07/08/22  0831 07/05/22  1004   URINEPH 8.0* 5.5 5.5   NITRITE Negative Negative Negative   LEUKEST Negative Negative Negative   WBCU  --  1 5       Most Recent [place] Pulmonary Function Testing (FVC/FEV1 only)  FVC-Pre   Date Value Ref Range Status   04/29/2022 1.82 L    11/11/2021 2.17 L    06/14/2021 2.00 L      FVC-%Pred-Pre   Date Value Ref Range Status   04/29/2022 58 %    11/11/2021 70 %    06/14/2021 64 %      FEV1-Pre   Date Value Ref Range Status   04/29/2022 0.51 L    11/11/2021 0.53 L    06/14/2021 0.54 L      FEV1-%Pred-Pre   Date Value Ref Range Status   04/29/2022 20 %    11/11/2021 21 %    06/14/2021 21 %        IMAGING    Recent Results (from the past 48 hour(s))   XR Chest 2 Views    Narrative    EXAM:  XR CHEST 2 VIEWS    INDICATION: interval follow up, lung transplant, bilateral chest tubes    COMPARISON:  Chest x-ray 7/31/2022    HISTORY:  History of end-stage COPD S/P bilateral lung transplant  6/20/2022. Lungs were noted to be slightly undersized.    FINDINGS:  PA and lateral  views of the chest. Postsurgical chest with intact  clamshell thoracotomy wires. Right basilar and left apical chest  tubes, stable. Left-sided central venous catheter terminating over the  subclavian vein, stable.    Cardiomediastinal silhouette within normal limits. Post surgical  changes of bilateral lung transplant. Stable perihilar opacities.  Right hydropneumothorax with decreased size of air locule. Left  hydropneumothorax with decreased but persistent air locule and some  increased fluid component. Unremarkable upper abdomen. No acute bony  lesions.      Impression    IMPRESSION:  1.  Right loculated hydropneumothorax with decreased size of the right  air locule overlying the minor fissure.  2.  Slightly increased fluid component with stable air component of  the left loculated hydro-pneumothorax.    I have personally reviewed the examination and initial interpretation  and I agree with the findings.    ALVINA HARRY MD         SYSTEM ID:  [id number]

## 2022-08-02 NOTE — PROGRESS NOTES
Cross Cover    08/02/22  9:43 AM    Noticed that patient's AM glucose was 89, she was NPO starting at 5 AM and she got full dose of glargine this AM. Getting bronch at 11 AM. Spoke to RN about more frequent glucose checks (he'll check next at 10 and then right before transfer to bronch) and we'll have D10 drip available (conditional order already in as part of hypoglycemia protocol) as she is at risk for hypoglycemia.     Gaby Malhotra MD  Internal Medicine/Pediatrics Hospitalist  [phone]

## 2022-08-02 NOTE — PROGRESS NOTES
[facility]    Medicine Progress Note - Hospitalist Service, GOLD TEAM 10    Date of Admission:  6/28/2022    Assessment & Plan        Sofie Rodriguez is a 62 year old female admitted on 6/28/2022. She has PMH of end stage COPD s/p b/l lung transplant on 6/28/2022, HTN, HFpEF, h/o hepC, osteopenia, and former methamphetamine use, and she was transferred to [place] [address] Medicine from MICU on 7/15/2022. She was admitted to the MICU on 7/13/20222 with prior hospital course complicated by left upper extremity acute limb ischemia s/p left radial thrombectomy on 7/1/2022. She was primarily treated for acute hypercapnic respiratory failure on intermittent BIPAP with worsening BACILIO while in the MICU. Breathing is improving, but patient has severely delayed gastric emptying found on NM study. PEGJ placed  Still awaiting chest tube removal and will discharge to ARU vs TCU when able.      Today's Plan:  - inspection bronch-- looked good  - coffee ground appearance black/brown fluid from g tube -- consulted GI and stopped heparin drip  - IV PPI rather than enteral--planning for AM EGD  - hgb stable  - clear liquids okay to eat until 8 PM, tube feeds okay to continue until midnight  -no diuresis today 8/2  -anticipate clamping left chest tube on 8/3  -AM PTT, INR, CBC     End stage COPD s/p BSLT 6/28/2022  Acute hypercapnic hypoxic respiratory failure (improving)  EBV Viremia  likely 2/2 decreased central respiratory drive vs respiratory muscle weakness and some pulmonary edema / fluid Transplanted 6/28. formal SNIFF test was performed on 7/14 showed R hemidiaphragm palsy. Of note transplanted lungs were also considered small for body size and could contribute to decreased respiratory compensation for hypercarbia. VBG relatively stable (CO2 elevated but compensated with bicarb)  - oxycodone 5-10 mg q4h PRN  - encourage activity and getting up in bed; PT/OT participation  - encourage chest  physiotherapy QID  - weaned off Bipap at night  Immunosuppression:  - Prednisone per pulm  - Tacrolimus per pulm  -  BID  Prophylaxis:  - CMV - Valgancyclovir  - Thrush - PO nysatin  - PJP - TMP-SMX; dapsone started 7/18 at 50mg qMWF     Pleural Effusion, Right and Left  - 1 R chest tube (basilar) in place currently (pericardial drain was removed 7/15, L basilar was removed 7/20). CT chest 7/14 showing unchanged bilateral effusions and unchanged biapical pneumothoraces with resolved left basilar pneumothorax; bibasilar chest tubes in place with pericardial drain (which was removed 7/15).   - daily CXR  - RIGHT Chest Tube - still with outputs noted  - LEFT Apical Fluid collection - IR targeting via CT guided pigtail placement-- suspected clamping on 8/3     Hypotension, resolved  H/o HTN  H/o HFpEF  Likely vasoplegia post operatively. Last echo 7/7 normal EF with good LV function. S/p hydrocortisone 7/6-7/9 and fludrocortisone 7/6-7/11 without significant improvement.  - off midorine 7/19  - Holding PTA lasix 20mg daily-- diuresis intermittently    BACILIO-- recurrent, improved 8/1-- likely due to supratherapeutic tacrolimus  Hypermagnesemia  Hyperphosphatemia  Metabolic alkalosis  Baseline renal function was normal prior to surgery. New onset renal failure after transplant, likely multifactorial due to pre-renal hypotension, less likely nephrotoxic agents. Overall kidney function improving. Today, Cr fluctuating but BUN increasing, with unclear urine output (7/22).   - HD cath removed 7/22, trend metabolites  - Adequate UOP despite Cr increase  - 8/1: 500 ml NS per Renal recs- tolerated; held fluid/diuresis on 8/2    C.diff - vanco started 7/12, course completed    Severe Gastroparesis - gastric emptying study 7/20  - PO vanc 125mg QID 7/12 to present  - gastric emptying study 7/20 showed delayed gastric emptying with retention of 95% of stomach contents after 4 hours; please keep patient NPO except tube feeds  and meds  - Simethicone scheduled  - 7/27: PEGJ placed     Dark brown gastric output-- noted after inspection bronch when G tube was hooked back up to gravity on 8/2-- likely oozing  consulted GI and stopped heparin drip  - IV PPI rather than enteral--planning for AM EGD 8/3  - hgb stable  - clear liquids okay to eat until 8 PM, tube feeds okay to continue until midnight  -AM PTT, INR, CBC     NJ tube  Feeding regimen:   - Adult Formula Drip Feeding: Continuous Novasource Renal; Nasojejunal; Goal Rate: 35; initiate at goal rate; mL/hr; Medication - Feeding Tube Flush Frequency: At least 15-30 mL water before and after medication administration and with tube clogging     Tachyarrthymia  Paroxysmal afib  Paroxysmal aflutter/AT  SVT vs afib.Had an occurrence during HD on 7/14. Had afib with RVR to 200s on 7/17. EP consult placed.asmyptomatic and stable during episodes. Aflutter episode (7/17) with transfer. Vagal maneuver responsive at time. No recent tachyarrhythmia episodes (7/22).   - Per EP: patient has had episodes of possible flutter (vs AT with 2:1 conduction) and afib with RVR  - heparin drip (temporarily stopped due to suspected upper GI bleeding)-- eventual plan to transition to DOAC after PEGJ placement and chest tubes resolved (CHADS-VASc score of 2)  - On telemetry  - On metoprolol tartrate 25mg BID  - Discharge on ziopatch for 14 days with EP follow up in 1-2 months after     Stress-induced hyperglycemia  Has been controlled on 13-15 units of glargine BID  - on 15U glargine BID  --decreasing glargine in prep for NPO status/procedure on 8/3    Acute Blood Loss Anemia, stable  Initially from acute blood loss. Hb dropped to 6.8 on 7/14, requiring 1U pRBC. Post-transfusion Hb 9.4 > 8.3 > 8.6 > 8.7 (7/18).   - 7/30: s/p 1 unit prbc   - continue to monitor  - transfuse for hgb<7        Diet: Adult Formula Drip Feeding: Continuous Novasource Renal; Jejunostomy; Goal Rate: 35; mL/hr; Medication - Feeding Tube  Flush Frequency: At least 15-30 mL water before and after medication administration and with tube clogging; Amount to Send (Nutri...  NPO per Anesthesia Guidelines for Procedure/Surgery Except for: No Exceptions    DVT Prophylaxis: Pneumatic Compression Devices; heparin drip stopped due to bleeding  Dong Catheter: Not present  Central Lines: PRESENT     Cardiac Monitoring: None  Code Status: Full Code      Disposition Plan      Expected Discharge Date: 08/08/2022      Destination: home  Discharge Comments: TCU, lot's of delays  - Chest tubes still in place        The patient's care was discussed with the Bedside Nurse, Patient and pulm, GI Consultant.    Gaby Malhotra MD  Hospitalist Service, GOLD TEAM [address] [place] [address]  Securely message with the Vocera Web Console (learn more here)  Text page via Thumbplay Paging/Directory   Please see signed in provider for up to date coverage information      Clinically Significant Risk Factors Present on Admission                      ______________________________________________________________________    Interval History   Was NPO this AM for bronch. After getting back she felt tired. No nausea/emesis or epigastric pain despite brown/black gastric output from G tube. No vomiting. No fever/chills. Wonders when she can eat. Breathing feels comfortable. 4 pt ROS otherwise negative.    Data reviewed today: I reviewed all medications, new labs and imaging results over the last 24 hours. I personally reviewed no images or EKG's today.    Physical Exam   Vital Signs: Temp: 98.4  F (36.9  C) Temp src: Oral BP: 121/75 Pulse: 100   Resp: 12 SpO2: 99 % O2 Device: Nasal cannula Oxygen Delivery: 1.5 LPM  Weight: 154 lbs 5.15 oz  Constitutional: A bit sleepy but wakes easily to voice  Sitting comfortably in bed.  Head: Normocephalic. Atraumatic.   Cardiovascular: Regular rate and rhythm. No murmurs, clicks, gallops or rubs. No edema   Respiratory:  Clear to auscultation without wheezes or crackles. Normal respiratory rate and effort.   Gastrointestinal: Abdomen soft, non-tender. BS normal. G tube with dark brown/black output in bag  Musculoskeletal: extremities normal- no gross deformities noted and normal muscle tone  Skin: no suspicious lesions, rashes, jaundice, or cyanosis on exposed skin.  Neuro: sleepy but wakes easily to voice    Data   Recent Labs   Lab 08/02/22  1526 08/02/22  1311 08/02/22  1310 08/02/22  1011 08/02/22  0902 08/02/22  0757 08/01/22  1218 08/01/22  0832 07/31/22  0942 07/31/22  0622 07/30/22  0838 07/30/22  0350 07/28/22  1049 07/28/22  0925 07/27/22  0801 07/27/22  0646   WBC  --   --   --   --   --  5.5  --   --   --  6.3  --  5.9   < > 3.9*  --  3.1*   HGB  --  7.4*  --   --   --  7.5*  --   --   --  8.0*  --  6.7*   < > 8.7*  --  8.3*   MCV  --   --   --   --   --  104*  --   --   --  102*  --  108*   < > 110*  --  107*   PLT  --   --   --   --   --  216  --   --   --  252  --  244   < > 240  --  235   INR  --   --   --   --   --   --   --   --   --   --   --   --   --   --   --  0.87   NA  --   --   --   --   --  141  --  140  --  138  --  142   < > 144  --  143   POTASSIUM  --   --   --   --   --  4.6  --  4.2  --  4.5  --  4.5   < > 4.1  --  4.9   CHLORIDE  --   --   --   --   --  94*  --  91*  --  89*  --  91*   < > 90*  --  90*   CO2  --   --   --   --   --  47*  --  47*  --  46*  --  49*   < > >50*  --  45*   BUN  --   --   --   --   --  76.0*  --  80.2*  --  88.3*  --  84.2*   < > 88.1*  --  84.6*   CR  --   --   --   --   --  1.40*  --  1.67*  --  1.86*  --  1.61*   < > 1.44*  --  1.35*   ANIONGAP  --   --   --   --   --  <1*  --  2*  --  3*  --  2*   < >  --   --  8   ESTUARDO  --   --   --   --   --  9.0  --  9.0  --  8.9  --  9.1   < > 9.6  --  9.7   *  --  141* 89   < > 96   < > 144*   < > 152*   < > 185*   < > 120*   < > 85   ALBUMIN  --   --   --   --   --   --   --  2.8*  --   --   --   --   --  3.0*  --   --     PROTTOTAL  --   --   --   --   --   --   --  5.3*  --   --   --   --   --  5.2*  --   --    BILITOTAL  --   --   --   --   --   --   --  0.2  --   --   --   --   --  0.2  --   --    ALKPHOS  --   --   --   --   --   --   --  68  --   --   --   --   --  60  --   --    ALT  --   --   --   --   --   --   --  12  --   --   --   --   --  12  --   --    AST  --   --   --   --   --   --   --  22  --   --   --   --   --  17  --   --     < > = values in this interval not displayed.     Recent Results (from the past 24 hour(s))   XR Chest 2 Views    Narrative    EXAM: XR CHEST 2 VIEWS 8/2/2022 1:55 PM    HISTORY: interval follow up, lung transplant, bilateral chest tubes.    COMPARISON: 8/1/2022.    TECHNIQUE: Upright frontal and lateral views of the chest.    FINDINGS: Stable left apical chest tube. Stable right basilar chest  tube. Post surgical changes of bilateral lung transplant with  clamshell sternotomy wires intact. Unchanged left upper extremity  vascular catheter.    Trachea is midline. Cardiac and mediastinal silhouette is stable.  Stable perihilar pulmonary opacities. Bilateral hydropneumothorax with  decreased air locule. Unchanged biapical opacity. Metallic density  projecting over the left upper quadrant on the frontal projection,  likely external to the patient      Impression    IMPRESSION:     1. Bilateral loculated hydropneumothorax with decreased air component  compared to prior radiograph.  2. Relatively unchanged perihilar pulmonary opacities.    I have personally reviewed the examination and initial interpretation  and I agree with the findings.    NIKOS JAVED MD         SYSTEM ID:  [id number]     Medications     dextrose Stopped (07/15/22 1801)     [Held by provider] heparin Stopped (08/02/22 0900)       acetaminophen  975 mg Oral or Feeding Tube TID     acetylcysteine  2 mL Nebulization TID     [Held by provider] aspirin  81 mg Oral Daily     calcium carbonate 600 mg-vitamin D 400 units  1 tablet  Per Feeding Tube BID w/meals     dapsone  50 mg Oral or Feeding Tube Once per day on Mon Wed Fri     insulin aspart  1-12 Units Subcutaneous Q4H     insulin glargine  15 Units Subcutaneous BID     levalbuterol  1.25 mg Nebulization TID     metoprolol tartrate  25 mg Per Feeding Tube BID     multivitamin, therapeutic  1 tablet Per Feeding Tube Daily     mycophenolate  500 mg Oral or Feeding Tube BID     nystatin   Topical BID     nystatin  1,000,000 Units Swish & Swallow 4x Daily     ondansetron  4 mg Oral Daily     pantoprazole (PROTONIX) IV  40 mg Intravenous BID     predniSONE  12.5 mg Oral or Feeding Tube BID     protein modular  1 packet Per Feeding Tube Daily     sodium chloride (PF)  10 mL Intracatheter Q8H     sodium chloride (PF)  10 mL Intracatheter Q8H     sodium chloride (PF)  3 mL Intracatheter Q8H     tacrolimus  2 mg Per G Tube QAM    And     tacrolimus  2 mg Per G Tube QPM     thiamine  100 mg Oral or Feeding Tube Daily     [START ON 8/3/2022] valGANciclovir  450 mg Oral or Feeding Tube Daily

## 2022-08-02 NOTE — PROGRESS NOTES
Transplant Social Work Services Progress Note      Date of Initial Social Work Evaluation: 4/8/2021  Collaborated with: accommodations, pulmonary team, care coordinator    Data: spoke with patient's daughterCharity.  Updated her on patient progress with therapies.  Now likely not requiring inpatient rehab and can discharge directly to [facility] with family assist.  discharge time frame still unclear, due to chest tube drainage, but could be early next week.  Informed her that people would be in touch to schedule their teaching.  Requested that she or sister make themselves available to check into the apartment sometime this week.  DaughterJulia will be here on Friday.   Intervention: updated accommodations.  They will reach out to Julia to schedule apt. Check in.    Assessment: patient progressing   Education provided by SW: discharge plan, lodging options  Plan:    Discharge Plans in Progress: [facility] with assist    Barriers to d/c plan: chest tubes    Follow up Plan: will continue to follow for support, counseling, education and resources.      Mana Valdivia [facility]  Lung Transplant   Phone: [phone] Pager: [phone]

## 2022-08-02 NOTE — PROGRESS NOTES
"  Brief CVTS Progress Note  08/2/2022     Sofie Rodriguez is a 62 year old female with PMH significant for oxygen-dependent COPD, HF, HTN, HCV, osteopenia, and methamphetamine abuse in remission.  She is now s/p BSLT by Dr. Sunshine on 6/28/2022.  Her post-operative course has been complicated by LUE critical limb ischemia now s/p left radial thrombectomy on 7/1/2022, hypercapneic respiratory insufficiency, BACILIO, and dysphagia.     A/P:  S/p BLST on 6/28/2022, post op course c/b LUE critical limb ischemia now s/p left radial thrombectomy on 7/1/2022, hypercapneic respiratory insufficiency, BACILIO, and dysphagia.    -Hgb<7 7/30, responded appropriately s/p one unit pRBCs     - Chest tube output past 24h from the right 300/60 ml, 120/0cc from the left.   - Keep right pleural chest tube for drainage. No air leaks.   - IR-placed left apical pigtail chest tube on 7/25; management per Pulmonology  - Per surgeon, lungs did not fill chest space  - Daily wound care per nursing:  Wound cleanser to clamshell incision, pat dry, may be left open to air; keep incision C/D/I  - Remainder of plan per Pulm Transplant/Medicine teams  - Bronch scheduled for today.      Discussed with Dr. Rivas as needed    Penny Mallory PA-C  Cardiothoracic Surgery  Pager [phone]  August 2, 2022         Interval History:   No acute complaints          Physical Exam:     Vitals: /75 (BP Location: Right arm)   Pulse 100   Temp 98.4  F (36.9  C) (Oral)   Resp 12   Ht 1.575 m (5' 2\")   Wt 70 kg (154 lb 5.2 oz)   SpO2 99%   BMI 28.23 kg/m    BMI= Body mass index is 28.23 kg/m .    Gen: A&Ox4, NAD  Neuro: no focal deficits   CV: NSR on tele.   Pulm: Breathing regular and non-labored  Incision: C/D/I, healing nicely             Data:    Imaging:  No results found for this or any previous visit (from the past 24 hour(s)).   8/1/22 10:20 AM [id number] [facility]        PACS Images     Show images for XR Chest 2 " Views           Study Result    Narrative & Impression   EXAM:  XR CHEST 2 VIEWS     INDICATION: interval follow up, lung transplant, bilateral chest tubes     COMPARISON:  Chest x-ray 7/31/2022     HISTORY:  History of end-stage COPD S/P bilateral lung transplant  6/20/2022. Lungs were noted to be slightly undersized.     FINDINGS:  PA and lateral views of the chest. Postsurgical chest with intact  clamshell thoracotomy wires. Right basilar and left apical chest  tubes, stable. Left-sided central venous catheter terminating over the  subclavian vein, stable.     Cardiomediastinal silhouette within normal limits. Post surgical  changes of bilateral lung transplant. Stable perihilar opacities.  Right hydropneumothorax with decreased size of air locule. Left  hydropneumothorax with decreased but persistent air locule and some  increased fluid component. Unremarkable upper abdomen. No acute bony  lesions.                                                                      IMPRESSION:  1.  Right loculated hydropneumothorax with decreased size of the right  air locule overlying the minor fissure.  2.  Slightly increased fluid component with stable air component of  the left loculated hydro-pneumothorax.     I have personally reviewed the examination and initial interpretation  and I agree with the findings.     ALVINA HARRY MD          BMP  Recent Labs   Lab 08/02/22  0451 08/01/22  2305 08/01/22  1930 08/01/22  1537 08/01/22  1218 08/01/22  0832 07/31/22  0942 07/31/22  0622 07/30/22  0838 07/30/22  0350 07/29/22  0818 07/29/22  0446   NA  --   --   --   --   --  140  --  138  --  142  --  145   POTASSIUM  --   --   --   --   --  4.2  --  4.5  --  4.5  --  4.5   CHLORIDE  --   --   --   --   --  91*  --  89*  --  91*  --  91*   ESTUARDO  --   --   --   --   --  9.0  --  8.9  --  9.1  --  9.2   CO2  --   --   --   --   --  47*  --  46*  --  49*  --  48*   BUN  --   --   --   --   --  80.2*  --  88.3*  --  84.2*  --   86.8*   CR  --   --   --   --   --  1.67*  --  1.86*  --  1.61*  --  1.50*   * 129* 143* 171*   < > 144*   < > 152*   < > 185*   < > 165*    < > = values in this interval not displayed.     CBC  Recent Labs   Lab 08/02/22  0757 07/31/22  0622 07/30/22  0350 07/29/22  0446   WBC 5.5 6.3 5.9 4.9   RBC 2.45* 2.61* 2.08* 2.19*   HGB 7.5* 8.0* 6.7* 7.0*   HCT 25.4* 26.7* 22.4* 23.8*   * 102* 108* 109*   MCH 30.6 30.7 32.2 32.0   MCHC 29.5* 30.0* 29.9* 29.4*   RDW 18.5* 19.1* 16.3* 16.3*    252 244 265     INR  Recent Labs   Lab 07/27/22  0646   INR 0.87      Hepatic Panel   Recent Labs   Lab 08/01/22  0832 07/28/22  0925   AST 22 17   ALT 12 12   ALKPHOS 68 60   BILITOTAL 0.2 0.2   ALBUMIN 2.8* 3.0*     Glucose  Recent Labs   Lab 08/02/22  0451 08/01/22  2305 08/01/22  1930 08/01/22  1537 08/01/22  1218 08/01/22  0832   * 129* 143* 171* 122* 144*

## 2022-08-02 NOTE — PLAN OF CARE
Patient is A&Ox4 . NSR to ST with occasional PVC's.  Patient with adequate UO. Patient voids on commode with stand by assist of 1 staff. Patient pain is managed with PRN medication, see eMAR. Patient resting comfortably on 2L NC per Pulmonary. Plan for Bronchoscopy this morning. Tube Feeds off at 0500 for NPO orders. Please see flowsheets for further plan of care, assessment, and vital signs.     Goal Outcome Evaluation:      Overall Patient Progress: no change

## 2022-08-02 NOTE — OR NURSING
Bronchoscopy, inspection only. conscious sedation and tolerated well. Report called to bedside RN.

## 2022-08-02 NOTE — PROGRESS NOTES
Care Management Follow Up    Length of Stay (days): 34    Expected Discharge Date: 08/08/2022     Concerns to be Addressed:  HI, PLC  Patient plan of care discussed at interdisciplinary rounds: Yes    Anticipated Discharge Disposition: Other (Comments)     Anticipated Discharge Services:    Anticipated Discharge DME:      Patient/family educated on Medicare website which has current facility and service quality ratings:    Education Provided on the Discharge Plan:    Patient/Family in Agreement with the Plan:      Referrals Placed by CM/SW:  HI  Private pay costs discussed: out of pocket expenses: HI referral for benefits check for TF.     Additional Information:  Patient now has recs for HWA/OP PT. [facility] Patient needing home TF set up, RNCC placed HI referral/benefits check through [facility]. Patient/daughters needing education for insulin and TF, RNCC requested diabetes educator consult for insulin teaching and placed orders for PLC for enteral nutrition teaching. RNCC to place orders for HI orders. Pt will need TF orders/discharge DME order for TF. RNCC continues to follow.    [facility]  Phone # [phone]  Fax # [phone]     MISAEL VanN, BA, RN, CMSRN, RNCC  Covering Units 6D/OBS  Pager: [phone]  Phone: [phone]  6th floor Weekend/Holiday Pager: [phone]  Observation weekend/after hours: [phone]

## 2022-08-02 NOTE — PROGRESS NOTES
Shift Summary: Bronch and chest xray done. Gastric emptying bad showed brownish black output. Notified the team. Heparin stopped placed on a clear liquid diet. Plan to be NPO at midnight for a possible scope tomorrow. Vitals stable, afebrile. BS within parameters. 2 watery brown BM today. Will continue to monitor.    Gil Cruz RN on 8/2/2022 at 5:25 PM

## 2022-08-02 NOTE — PROGRESS NOTES
"  Nephrology Progress Note  08/02/2022         Assessment & Recommendations:   62 year old female with severe COPD, s/p BSLT on 6/28/22, developed post-op BACILIO requiring iHD from 7/14 - 7/16. Subsequently had improvement in renal function and dialysis catheter removed.     1. Non oliguric BACILIO (resolving) - etiology likely secondary to supra therapeutic Tacrolimus levels.   2. Recent BACILIO Stage 3 (post-op) requiring HD from 7/14-7/16  3. S/p BSLT on 6/28/22  4. Combined respiratory acidosis with compensated metabolic alkalosis    Plan:    - Adjust Tacrolimus dose for target level.   - Monitor renal panel daily  - Strict intake/output and daily weight monitoring  - Renally dose all medications.   -Nephrology will sign off on the patient if any further question arises feel free to contact nephrology    Recommendations were communicated to primary team via note    Seen and discussed with Dr. Nkechi Alanis MD   Division of Renal Disease and Hypertension  [facility]  [employer] Web Console    Interval History :   Nursing and provider notes from last 24 hours reviewed.  In the last 24 hours Sofie Rodriguez has improvement in her kidney function her CO2 retention is worsening but she is well compensating for it.Does report some abdominal pain  Review of Systems:   I reviewed the following systems:  GI: decreased appetite. No nausea or vomiting.positive loose stool   Neuro:  No confusion  Constitutional:  No fever or chills  CV: No dyspnea or edema.  No chest pain.    Physical Exam:   I/O last 3 completed shifts:  In: 1639.6 [P.O.:50; I.V.:399.6; NG/GT:420]  Out: 2050 [Urine:900; Other:750; Chest Tube:400]   /71   Pulse 86   Temp 98.4  F (36.9  C) (Oral)   Resp 13   Ht 1.575 m (5' 2\")   Wt 70 kg (154 lb 5.2 oz)   SpO2 96%   BMI 28.23 kg/m       GENERAL APPEARANCE: Patient is not in acute distress  EYES:  no scleral icterus, pupils equal  PULM: lungs clear to auscultation bilaterally, equal air movement, " no clubbing  CV: Patient has regular rhythm, normal rate, no rub     -JVD -ve     -edema +2   GI: soft, non tender, non distended, bowel sounds are present  INTEGUMENT: no cyanosis, no rash  NEURO:  no asterixis   Access none    Labs:   All labs reviewed by me  Electrolytes/Renal -   Recent Labs   Lab Test 08/02/22  1310 08/02/22  1011 08/02/22  0902 08/02/22  0757 08/01/22  1218 08/01/22  0832 07/31/22  0942 07/31/22  0622   NA  --   --   --  141  --  140  --  138   POTASSIUM  --   --   --  4.6  --  4.2  --  4.5   CHLORIDE  --   --   --  94*  --  91*  --  89*   CO2  --   --   --  47*  --  47*  --  46*   BUN  --   --   --  76.0*  --  80.2*  --  88.3*   CR  --   --   --  1.40*  --  1.67*  --  1.86*   * 89 89 96   < > 144*   < > 152*   ESTUARDO  --   --   --  9.0  --  9.0  --  8.9   MAG  --   --   --  2.6*  --  2.7*  --  2.7*   PHOS  --   --   --  4.1  --  4.7*  --  5.2*    < > = values in this interval not displayed.       CBC -   Recent Labs   Lab Test 08/02/22  0757 07/31/22  0622 07/30/22  0350   WBC 5.5 6.3 5.9   HGB 7.5* 8.0* 6.7*    252 244       LFTs -   Recent Labs   Lab Test 08/01/22  0832 07/28/22  0925 07/25/22  0648   ALKPHOS 68 60 61   BILITOTAL 0.2 0.2 0.2   ALT 12 12 15   AST 22 17 19   PROTTOTAL 5.3* 5.2* 4.7*   ALBUMIN 2.8* 3.0* 2.6*       Iron Panel -   Recent Labs   Lab Test 07/21/22  0122   IRON 31*   IRONSAT 11*   CARLOS 189         Imaging:  All imaging studies reviewed by me.     Current Medications:    acetaminophen  975 mg Oral or Feeding Tube TID     acetylcysteine  2 mL Nebulization TID     [Held by provider] aspirin  81 mg Oral Daily     calcium carbonate 600 mg-vitamin D 400 units  1 tablet Per Feeding Tube BID w/meals     dapsone  50 mg Oral or Feeding Tube Once per day on Mon Wed Fri     insulin aspart  1-12 Units Subcutaneous Q4H     insulin glargine  15 Units Subcutaneous BID     levalbuterol  1.25 mg Nebulization TID     metoprolol tartrate  25 mg Per Feeding Tube BID      multivitamin, therapeutic  1 tablet Per Feeding Tube Daily     mycophenolate  500 mg Oral or Feeding Tube BID     nystatin   Topical BID     nystatin  1,000,000 Units Swish & Swallow 4x Daily     ondansetron  4 mg Oral Daily     pantoprazole (PROTONIX) IV  40 mg Intravenous BID     predniSONE  12.5 mg Oral or Feeding Tube BID     protein modular  1 packet Per Feeding Tube Daily     sodium chloride (PF)  10 mL Intracatheter Q8H     sodium chloride (PF)  10 mL Intracatheter Q8H     sodium chloride (PF)  3 mL Intracatheter Q8H     tacrolimus  2 mg Per G Tube QAM    And     tacrolimus  2 mg Per G Tube QPM     thiamine  100 mg Oral or Feeding Tube Daily     valGANciclovir  450 mg Oral or Feeding Tube Once per day on Mon Wed Fri       dextrose Stopped (07/15/22 1801)     [Held by provider] heparin Stopped (08/02/22 0900)     Glenn Alanis MD

## 2022-08-02 NOTE — CONSULTS
[facility]  GASTROENTEROLOGY CONSULTATION      Date of Admission:     6/28/2022  Requesting physician: Gaby Hernandez MD             Reason for Consultation:   We were asked by Gaby Hernandez MD to evaluate this patient with coffee ground G tube output    History is obtained from the patient         ASSESSMENT AND RECOMMENDATIONS:   Assessment:  Sofie Rodriguez is a 62 year old female with a history of HFpEF, and end stage COPD s/p bilateral lung transplant (6/28/22) complicated by acute limb ischemia of LUE requiring left radial thrombectomy, EBV viremia and C.diff (vancomycin 7/12/22-7/28/22). She was found to have delayed gastric emptying on GES and underwent percutaneous GJ tube placement by IR on 7/27/22. GI consulted for development of G tube output concerning for upper GI bleed.      Patient s/p uneventful placement of gastrojejunostomy tube by IR on 7/27/22 now with black fluid output in G tube bag to gravity concerning for GI bleed. Gastric port of GJ tube was lavaged today with return of black fluid.  The output is suggestive of digested blood from an upper GI source. DDx include GJ tube associate bleeding (most likely) vs PUD (on prednisone) vs medication induced gastritis (MMF, tacrolimus). She has also been on aspirin and low intensity heparin drip which increase risk of bleeding.     Patient asymptomatic and hemodynamically stable. Repeat hemoglobin after GJ tube output remained stable as well. No need for urgent endoscopy but will plan for upper endoscopy evaluation within 24 hours.       Recommendations  - Plan for upper endoscopy 8/3/22 (Wednesday)   - okay for CLD (no red or purples) until 8pm tonight then NPO   - hold tube feeds at midnight   - Agree with IV PPI BID for now (previously on PO PPI BID)  - continue holding anticoagulation     Discussed plan with patient and primary team.     Thank you for involving us in this patient's care. Please do not hesitate to  contact the GI service with any questions or concerns.     Pt care plan discussed with Dr. Andres, GI staff physician.    Tenzin Arroyo MD  Gastroenterology Fellow - PGY4  [facility]   Page [employer]       -------------------------------------------------------------------------------------------------------------------           History of Present Illness:   Sofie Rodriguez is a 62 year old female with a history of HFpEF, and end stage COPD s/p bilateral lung transplant (6/28/22) complicated by acute limb ischemia of LUE requiring left radial thrombectomy, EBV viremia and C.diff (vancomycin 7/12/22-7/28/22). She was found to have delayed gastric emptying on GES and underwent percutaneous GJ tube placement by IR on 7/27/22.       Today patient had bronchoscopy and on return from procedure, team noted her GJ tube had been clamped. When it was unclamped, 100cc of black fluid filled the bag. Team flushed saline into G tube and more black fluid returned.     On visit today, patient denies any nausea, vomiting, dysphagia, odynophagia, or abdominal pain. No lightheadedness. No issues with PO intake although she has not eaten today due to procedures. She and bedside nurse note that stools have been light brown. She has been receiving tube feeds through her jejunal port without issues. No problems with GJ tube site.     Percutaneous GJ tube placed by IR 7/27 without issues. Prior to that she had NJ that was placed by GI endoscopically on 8/1 which was negative for gross abnormalities on endoscopic evaluation of foregut.            Past Medical History:   Reviewed and edited as appropriate  Past Medical History:   Diagnosis Date     CHF (congestive heart failure) (H)      COPD (chronic obstructive pulmonary disease) (H)      Hepatitis 2017    Hep C, [facility]     HTN (hypertension)      Osteopenia             Past Surgical History:   Reviewed and edited as appropriate   Past Surgical History:   Procedure Laterality  Date     BRONCHOSCOPY FLEXIBLE AND RIGID N/A 07/19/2022    Procedure: BRONCHOSCOPY inspection only;  Surgeon: Bob Liao MD;  Location:  GI     COLONOSCOPY  2015     CV CORONARY ANGIOGRAM N/A 06/30/2021    Procedure: CV CORONARY ANGIOGRAM;  Surgeon: Alexander Cuellar MD;  Location:  HEART CARDIAC CATH LAB     CV RIGHT HEART CATH MEASUREMENTS RECORDED N/A 06/30/2021    Procedure: CV RIGHT HEART CATH;  Surgeon: Alexander Cuellar MD;  Location:  HEART CARDIAC CATH LAB     ENT SURGERY  1974    tonsillectomy     ESOPHAGOGASTRODUODENOSCOPY, WITH NASOGASTRIC TUBE INSERTION N/A 07/01/2022    Procedure: ESOPHAGOGASTRODUODENOSCOPY, WITH NASOJEJUNAL TUBE INSERTION;  Surgeon: Ozzy Nickerson MD;  Location:  GI     HAND SURGERY       LEEP TX, CERVICAL  04/07/2017    HECTOR III     LYMPH NODE BIOPSY Left 2005    Left axilla, benign- [place]     MIDLINE INSERTION - DOUBLE LUMEN Left 07/28/2022    20cm, Basilic vein     THROMBECTOMY UPPER EXTREMITY Left 07/02/2022    Procedure: LEFT RADIAL ARM THROMBECTOMY;  Surgeon: Christie Graham MD;  Location:  OR     TRANSPLANT LUNG RECIPIENT SINGLE X2 Bilateral 06/28/2022    Procedure: Clamshell Incision, Bilateral Sequential Lung Transplant, On Cardiopulmonary Bypass, Flexible Bronchoscopy;  Surgeon: Sue Sunshine MD;  Location:  OR              Social History:   Reviewed,   Previous smoker, quit in 2020. Smoked for 30 years. No recent alcohol use.            Family History:   Reviewed and no pertinent family history.          Allergies:   Reviewed and edited as appropriate     Allergies   Allergen Reactions     Blood Transfusion Related (Informational Only) Other (See Comments)     Patient has a history of a clinically significant antibody against RBC antigens.  A delay in compatible RBCs may occur.             Medications:     Current Facility-Administered Medications   Medication     acetaminophen (TYLENOL) tablet 650 mg     acetaminophen (TYLENOL)  tablet 975 mg     acetylcysteine (MUCOMYST) 20 % nebulizer solution 2 mL     [Held by provider] aspirin (ASA) chewable tablet 81 mg     benzocaine-menthol (CEPACOL) 15-3.6 MG lozenge 1 lozenge     bisacodyl (DULCOLAX) suppository 10 mg     calcium carbonate (TUMS) chewable tablet 500-1,000 mg     calcium carbonate 600 mg-vitamin D 400 units (CALTRATE) per tablet 1 tablet     dapsone (ACZONE) tablet 50 mg     dextrose 10% infusion     glucose gel 15-30 g    Or     dextrose 50 % injection 25-50 mL    Or     glucagon injection 1 mg     [Held by provider] heparin infusion 25,000 units in D5W 250 mL ANTICOAGULANT     hydrOXYzine (ATARAX) tablet 25 mg    Or     hydrOXYzine (ATARAX) tablet 50 mg     insulin aspart (NovoLOG) injection (RAPID ACTING)     insulin glargine (LANTUS PEN) injection 15 Units     levalbuterol (XOPENEX) neb solution 1.25 mg     Lidocaine (LIDOCARE) 4 % Patch 2 patch     lidocaine (LMX4) cream     lidocaine 1 % 0.1-1 mL     lidocaine patch in PLACE     magnesium hydroxide (MILK OF MAGNESIA) suspension 30 mL     metoprolol tartrate (LOPRESSOR) tablet 25 mg     multivitamin, therapeutic (THERA-VIT) tablet 1 tablet     mycophenolate (CELLCEPT BRAND) suspension 500 mg     naloxone (NARCAN) injection 0.2 mg    Or     naloxone (NARCAN) injection 0.4 mg    Or     naloxone (NARCAN) injection 0.2 mg    Or     naloxone (NARCAN) injection 0.4 mg     nystatin (MYCOSTATIN) cream     nystatin (MYCOSTATIN) suspension 1,000,000 Units     ondansetron (ZOFRAN ODT) ODT tab 4 mg     ondansetron (ZOFRAN ODT) ODT tab 4 mg    Or     ondansetron (ZOFRAN) injection 4 mg     oxyCODONE (ROXICODONE) tablet 5-10 mg     pantoprazole (PROTONIX) IV push injection 40 mg     predniSONE (DELTASONE) tablet 12.5 mg     prochlorperazine (COMPAZINE) injection 10 mg    Or     prochlorperazine (COMPAZINE) tablet 10 mg     protein modular (PROSOURCE TF) 1 packet     simethicone (MYLICON) suspension 40 mg     sodium chloride (PF) 0.9% PF  "flush 10 mL     sodium chloride (PF) 0.9% PF flush 10 mL     sodium chloride (PF) 0.9% PF flush 10-20 mL     sodium chloride (PF) 0.9% PF flush 10-20 mL     sodium chloride (PF) 0.9% PF flush 3 mL     sodium chloride (PF) 0.9% PF flush 3 mL     tacrolimus (GENERIC EQUIVALENT) suspension 2 mg    And     tacrolimus (GENERIC EQUIVALENT) suspension 2 mg     thiamine (B-1) tablet 100 mg     [START ON 8/3/2022] valGANciclovir (VALCYTE) solution 450 mg             Review of Systems:   A complete 10 point review of systems was obtained.  Please see the HPI for pertinent positives and negatives.    All other systems were reviewed and were found to be negative.            Physical Exam:   /68 (BP Location: Right arm, Cuff Size: Adult Regular)   Pulse 104   Temp 98.5  F (36.9  C) (Oral)   Resp 16   Ht 1.575 m (5' 2\")   Wt 70 kg (154 lb 5.2 oz)   SpO2 97%   BMI 28.23 kg/m    Wt:   Wt Readings from Last 2 Encounters:   08/02/22 70 kg (154 lb 5.2 oz)   04/29/22 63.5 kg (140 lb)      Constitutional: cooperative, pleasant, no acute distress  Eyes: Sclera anicteric  Ears/nose/mouth/throat: Normal oropharynx without ulcers or exudate, mucus membranes moist, hearing intact  CV: RRR, no murmurs.   Respiratory: CTAB no wheezes or crackles. 2 chest tubes in place.   Abd: Soft, Nondistended, +bs, no hepatosplenomegaly, nontender, no peritoneal signs.G tube in place without surrounding erythema or drainage. Black fluid in tubing and bag.  Skin: warm, perfused, no jaundice  Neuro: AAO x 3, No asterixis  Psych: Normal affect  MSK: No gross deformities         Data:   Labs and imaging below were independently reviewed and interpreted    BMP  Recent Labs   Lab 08/02/22  1526 08/02/22  1310 08/02/22  1011 08/02/22  0902 08/02/22  0757 08/01/22  1218 08/01/22  0832 07/31/22  0942 07/31/22  0622 07/30/22  0838 07/30/22  0350   NA  --   --   --   --  141  --  140  --  138  --  142   POTASSIUM  --   --   --   --  4.6  --  4.2  --  4.5  " --  4.5   CHLORIDE  --   --   --   --  94*  --  91*  --  89*  --  91*   ESTUARDO  --   --   --   --  9.0  --  9.0  --  8.9  --  9.1   CO2  --   --   --   --  47*  --  47*  --  46*  --  49*   BUN  --   --   --   --  76.0*  --  80.2*  --  88.3*  --  84.2*   CR  --   --   --   --  1.40*  --  1.67*  --  1.86*  --  1.61*   * 141* 89 89 96   < > 144*   < > 152*   < > 185*    < > = values in this interval not displayed.     CBC  Recent Labs   Lab 08/02/22  1311 08/02/22  0757 07/31/22  0622 07/30/22  0350 07/29/22  0446   WBC  --  5.5 6.3 5.9 4.9   RBC  --  2.45* 2.61* 2.08* 2.19*   HGB 7.4* 7.5* 8.0* 6.7* 7.0*   HCT  --  25.4* 26.7* 22.4* 23.8*   MCV  --  104* 102* 108* 109*   MCH  --  30.6 30.7 32.2 32.0   MCHC  --  29.5* 30.0* 29.9* 29.4*   RDW  --  18.5* 19.1* 16.3* 16.3*   PLT  --  216 252 244 265     INR  Recent Labs   Lab 07/27/22  0646   INR 0.87     LFTs  Recent Labs   Lab 08/01/22  0832 07/28/22  0925   ALKPHOS 68 60   AST 22 17   ALT 12 12   BILITOTAL 0.2 0.2   PROTTOTAL 5.3* 5.2*   ALBUMIN 2.8* 3.0*

## 2022-08-03 ENCOUNTER — APPOINTMENT (OUTPATIENT)
Dept: GENERAL RADIOLOGY | Facility: CLINIC | Age: 62
DRG: 007 | End: 2022-08-03
Attending: PHYSICIAN ASSISTANT
Payer: MEDICARE

## 2022-08-03 ENCOUNTER — APPOINTMENT (OUTPATIENT)
Dept: PHYSICAL THERAPY | Facility: CLINIC | Age: 62
DRG: 007 | End: 2022-08-03
Attending: THORACIC SURGERY (CARDIOTHORACIC VASCULAR SURGERY)
Payer: MEDICARE

## 2022-08-03 ENCOUNTER — HOME INFUSION (PRE-WILLOW HOME INFUSION) (OUTPATIENT)
Dept: PHARMACY | Facility: CLINIC | Age: 62
End: 2022-08-03

## 2022-08-03 LAB
ABO/RH(D): ABNORMAL
ANION GAP SERPL CALCULATED.3IONS-SCNC: 1 MMOL/L (ref 7–15)
ANTIBODY SCREEN: POSITIVE
APTT PPP: 27 SECONDS (ref 22–38)
BASE EXCESS BLDV CALC-SCNC: 20.4 MMOL/L (ref -7.7–1.9)
BASOPHILS # BLD MANUAL: 0 10E3/UL (ref 0–0.2)
BASOPHILS NFR BLD MANUAL: 0 %
BLD PROD TYP BPU: NORMAL
BLOOD COMPONENT TYPE: NORMAL
BUN SERPL-MCNC: 73.5 MG/DL (ref 8–23)
CALCIUM SERPL-MCNC: 9.2 MG/DL (ref 8.8–10.2)
CHLORIDE SERPL-SCNC: 94 MMOL/L (ref 98–107)
CODING SYSTEM: NORMAL
CREAT SERPL-MCNC: 1.39 MG/DL (ref 0.51–0.95)
DEPRECATED HCO3 PLAS-SCNC: 45 MMOL/L (ref 22–29)
EOSINOPHIL # BLD MANUAL: 0.1 10E3/UL (ref 0–0.7)
EOSINOPHIL NFR BLD MANUAL: 2 %
ERYTHROCYTE [DISTWIDTH] IN BLOOD BY AUTOMATED COUNT: 18.6 % (ref 10–15)
GFR SERPL CREATININE-BSD FRML MDRD: 43 ML/MIN/1.73M2
GLUCOSE BLDC GLUCOMTR-MCNC: 103 MG/DL (ref 70–99)
GLUCOSE BLDC GLUCOMTR-MCNC: 110 MG/DL (ref 70–99)
GLUCOSE BLDC GLUCOMTR-MCNC: 110 MG/DL (ref 70–99)
GLUCOSE BLDC GLUCOMTR-MCNC: 130 MG/DL (ref 70–99)
GLUCOSE BLDC GLUCOMTR-MCNC: 160 MG/DL (ref 70–99)
GLUCOSE BLDC GLUCOMTR-MCNC: 64 MG/DL (ref 70–99)
GLUCOSE BLDC GLUCOMTR-MCNC: 73 MG/DL (ref 70–99)
GLUCOSE SERPL-MCNC: 113 MG/DL (ref 70–99)
HCO3 BLDV-SCNC: 47 MMOL/L (ref 21–28)
HCT VFR BLD AUTO: 22.2 % (ref 35–47)
HGB BLD-MCNC: 6.5 G/DL (ref 11.7–15.7)
INR PPP: 0.87 (ref 0.85–1.15)
ISSUE DATE AND TIME: NORMAL
LYMPHOCYTES # BLD MANUAL: 0.1 10E3/UL (ref 0.8–5.3)
LYMPHOCYTES NFR BLD MANUAL: 2 %
MAGNESIUM SERPL-MCNC: 2.4 MG/DL (ref 1.7–2.3)
MCH RBC QN AUTO: 30.8 PG (ref 26.5–33)
MCHC RBC AUTO-ENTMCNC: 29.3 G/DL (ref 31.5–36.5)
MCV RBC AUTO: 105 FL (ref 78–100)
MONOCYTES # BLD MANUAL: 0.1 10E3/UL (ref 0–1.3)
MONOCYTES NFR BLD MANUAL: 2 %
MYELOCYTES # BLD MANUAL: 0.1 10E3/UL
MYELOCYTES NFR BLD MANUAL: 1 %
NEUTROPHILS # BLD MANUAL: 5 10E3/UL (ref 1.6–8.3)
NEUTROPHILS NFR BLD MANUAL: 93 %
NRBC # BLD AUTO: 0 10E3/UL
NRBC BLD AUTO-RTO: 0 /100
O2/TOTAL GAS SETTING VFR VENT: 35 %
PCO2 BLDV: 78 MM HG (ref 40–50)
PH BLDV: 7.39 [PH] (ref 7.32–7.43)
PHOSPHATE SERPL-MCNC: 3.7 MG/DL (ref 2.5–4.5)
PLAT MORPH BLD: ABNORMAL
PLATELET # BLD AUTO: 202 10E3/UL (ref 150–450)
PO2 BLDV: 29 MM HG (ref 25–47)
POTASSIUM SERPL-SCNC: 5.1 MMOL/L (ref 3.4–5.3)
RBC # BLD AUTO: 2.11 10E6/UL (ref 3.8–5.2)
RBC MORPH BLD: ABNORMAL
SODIUM SERPL-SCNC: 140 MMOL/L (ref 136–145)
SPECIMEN EXPIRATION DATE: ABNORMAL
TACROLIMUS BLD-MCNC: 3.1 UG/L (ref 5–15)
TME LAST DOSE: ABNORMAL H
TME LAST DOSE: ABNORMAL H
UNIT ABO/RH: NORMAL
UNIT NUMBER: NORMAL
UNIT STATUS: NORMAL
UNIT TYPE ISBT: 5100
UPPER GI ENDOSCOPY: NORMAL
WBC # BLD AUTO: 5.4 10E3/UL (ref 4–11)

## 2022-08-03 PROCEDURE — 83735 ASSAY OF MAGNESIUM: CPT | Performed by: STUDENT IN AN ORGANIZED HEALTH CARE EDUCATION/TRAINING PROGRAM

## 2022-08-03 PROCEDURE — 99232 SBSQ HOSP IP/OBS MODERATE 35: CPT | Mod: 24 | Performed by: INTERNAL MEDICINE

## 2022-08-03 PROCEDURE — G0500 MOD SEDAT ENDO SERVICE >5YRS: HCPCS | Performed by: INTERNAL MEDICINE

## 2022-08-03 PROCEDURE — 80197 ASSAY OF TACROLIMUS: CPT | Performed by: NURSE PRACTITIONER

## 2022-08-03 PROCEDURE — 85027 COMPLETE CBC AUTOMATED: CPT | Performed by: STUDENT IN AN ORGANIZED HEALTH CARE EDUCATION/TRAINING PROGRAM

## 2022-08-03 PROCEDURE — 0DJ08ZZ INSPECTION OF UPPER INTESTINAL TRACT, VIA NATURAL OR ARTIFICIAL OPENING ENDOSCOPIC: ICD-10-PCS | Performed by: INTERNAL MEDICINE

## 2022-08-03 PROCEDURE — 250N000009 HC RX 250: Performed by: INTERNAL MEDICINE

## 2022-08-03 PROCEDURE — 250N000013 HC RX MED GY IP 250 OP 250 PS 637: Performed by: NURSE PRACTITIONER

## 2022-08-03 PROCEDURE — 97116 GAIT TRAINING THERAPY: CPT | Mod: GP

## 2022-08-03 PROCEDURE — 97530 THERAPEUTIC ACTIVITIES: CPT | Mod: GP

## 2022-08-03 PROCEDURE — 94668 MNPJ CHEST WALL SBSQ: CPT

## 2022-08-03 PROCEDURE — 94640 AIRWAY INHALATION TREATMENT: CPT

## 2022-08-03 PROCEDURE — 80048 BASIC METABOLIC PNL TOTAL CA: CPT | Performed by: STUDENT IN AN ORGANIZED HEALTH CARE EDUCATION/TRAINING PROGRAM

## 2022-08-03 PROCEDURE — 250N000013 HC RX MED GY IP 250 OP 250 PS 637: Performed by: SURGERY

## 2022-08-03 PROCEDURE — 85007 BL SMEAR W/DIFF WBC COUNT: CPT | Performed by: STUDENT IN AN ORGANIZED HEALTH CARE EDUCATION/TRAINING PROGRAM

## 2022-08-03 PROCEDURE — 250N000012 HC RX MED GY IP 250 OP 636 PS 637: Performed by: INTERNAL MEDICINE

## 2022-08-03 PROCEDURE — 85730 THROMBOPLASTIN TIME PARTIAL: CPT | Performed by: INTERNAL MEDICINE

## 2022-08-03 PROCEDURE — 71046 X-RAY EXAM CHEST 2 VIEWS: CPT

## 2022-08-03 PROCEDURE — 250N000009 HC RX 250: Performed by: PHYSICIAN ASSISTANT

## 2022-08-03 PROCEDURE — 71046 X-RAY EXAM CHEST 2 VIEWS: CPT | Mod: 26 | Performed by: RADIOLOGY

## 2022-08-03 PROCEDURE — 250N000011 HC RX IP 250 OP 636: Performed by: INTERNAL MEDICINE

## 2022-08-03 PROCEDURE — 36592 COLLECT BLOOD FROM PICC: CPT | Performed by: STUDENT IN AN ORGANIZED HEALTH CARE EDUCATION/TRAINING PROGRAM

## 2022-08-03 PROCEDURE — 250N000013 HC RX MED GY IP 250 OP 250 PS 637: Performed by: THORACIC SURGERY (CARDIOTHORACIC VASCULAR SURGERY)

## 2022-08-03 PROCEDURE — 94640 AIRWAY INHALATION TREATMENT: CPT | Mod: 76

## 2022-08-03 PROCEDURE — 250N000011 HC RX IP 250 OP 636: Performed by: HOSPITALIST

## 2022-08-03 PROCEDURE — 86922 COMPATIBILITY TEST ANTIGLOB: CPT | Performed by: INTERNAL MEDICINE

## 2022-08-03 PROCEDURE — C9113 INJ PANTOPRAZOLE SODIUM, VIA: HCPCS | Performed by: INTERNAL MEDICINE

## 2022-08-03 PROCEDURE — 82803 BLOOD GASES ANY COMBINATION: CPT | Performed by: STUDENT IN AN ORGANIZED HEALTH CARE EDUCATION/TRAINING PROGRAM

## 2022-08-03 PROCEDURE — 99233 SBSQ HOSP IP/OBS HIGH 50: CPT | Performed by: INTERNAL MEDICINE

## 2022-08-03 PROCEDURE — 250N000013 HC RX MED GY IP 250 OP 250 PS 637: Performed by: HOSPITALIST

## 2022-08-03 PROCEDURE — 84100 ASSAY OF PHOSPHORUS: CPT | Performed by: STUDENT IN AN ORGANIZED HEALTH CARE EDUCATION/TRAINING PROGRAM

## 2022-08-03 PROCEDURE — 999N000157 HC STATISTIC RCP TIME EA 10 MIN

## 2022-08-03 PROCEDURE — 120N000002 HC R&B MED SURG/OB UMMC

## 2022-08-03 PROCEDURE — 43235 EGD DIAGNOSTIC BRUSH WASH: CPT | Performed by: INTERNAL MEDICINE

## 2022-08-03 PROCEDURE — 86850 RBC ANTIBODY SCREEN: CPT | Performed by: INTERNAL MEDICINE

## 2022-08-03 PROCEDURE — 250N000012 HC RX MED GY IP 250 OP 636 PS 637: Performed by: PHYSICIAN ASSISTANT

## 2022-08-03 PROCEDURE — 86901 BLOOD TYPING SEROLOGIC RH(D): CPT | Performed by: INTERNAL MEDICINE

## 2022-08-03 PROCEDURE — 85610 PROTHROMBIN TIME: CPT | Performed by: INTERNAL MEDICINE

## 2022-08-03 PROCEDURE — P9016 RBC LEUKOCYTES REDUCED: HCPCS | Performed by: THORACIC SURGERY (CARDIOTHORACIC VASCULAR SURGERY)

## 2022-08-03 PROCEDURE — 250N000013 HC RX MED GY IP 250 OP 250 PS 637: Performed by: STUDENT IN AN ORGANIZED HEALTH CARE EDUCATION/TRAINING PROGRAM

## 2022-08-03 PROCEDURE — 250N000013 HC RX MED GY IP 250 OP 250 PS 637: Performed by: INTERNAL MEDICINE

## 2022-08-03 RX ORDER — FLUMAZENIL 0.1 MG/ML
0.2 INJECTION, SOLUTION INTRAVENOUS
Status: CANCELLED | OUTPATIENT
Start: 2022-08-03 | End: 2022-08-04

## 2022-08-03 RX ORDER — LIDOCAINE 40 MG/G
CREAM TOPICAL
Status: CANCELLED | OUTPATIENT
Start: 2022-08-03

## 2022-08-03 RX ORDER — FENTANYL CITRATE 50 UG/ML
INJECTION, SOLUTION INTRAMUSCULAR; INTRAVENOUS PRN
Status: COMPLETED | OUTPATIENT
Start: 2022-08-03 | End: 2022-08-03

## 2022-08-03 RX ORDER — PREDNISONE 10 MG/1
10 TABLET ORAL EVERY EVENING
Status: DISCONTINUED | OUTPATIENT
Start: 2022-08-04 | End: 2022-08-05

## 2022-08-03 RX ADMIN — NYSTATIN 1000000 UNITS: 100000 SUSPENSION ORAL at 18:25

## 2022-08-03 RX ADMIN — TACROLIMUS 3 MG: 5 CAPSULE ORAL at 18:26

## 2022-08-03 RX ADMIN — FENTANYL CITRATE 50 MCG: 50 INJECTION, SOLUTION INTRAMUSCULAR; INTRAVENOUS at 12:49

## 2022-08-03 RX ADMIN — NYSTATIN 1000000 UNITS: 100000 SUSPENSION ORAL at 14:05

## 2022-08-03 RX ADMIN — METOPROLOL TARTRATE 25 MG: 25 TABLET, FILM COATED ORAL at 08:35

## 2022-08-03 RX ADMIN — MIDAZOLAM 1 MG: 1 INJECTION INTRAMUSCULAR; INTRAVENOUS at 12:49

## 2022-08-03 RX ADMIN — OXYCODONE HYDROCHLORIDE 10 MG: 5 TABLET ORAL at 08:36

## 2022-08-03 RX ADMIN — CALCIUM CARBONATE 600 MG (1,500 MG)-VITAMIN D3 400 UNIT TABLET 1 TABLET: at 18:25

## 2022-08-03 RX ADMIN — THERA TABS 1 TABLET: TAB at 14:05

## 2022-08-03 RX ADMIN — PANTOPRAZOLE SODIUM 40 MG: 40 INJECTION, POWDER, FOR SOLUTION INTRAVENOUS at 20:18

## 2022-08-03 RX ADMIN — ACETAMINOPHEN 975 MG: 325 TABLET, FILM COATED ORAL at 08:35

## 2022-08-03 RX ADMIN — PANTOPRAZOLE SODIUM 40 MG: 40 INJECTION, POWDER, FOR SOLUTION INTRAVENOUS at 08:35

## 2022-08-03 RX ADMIN — LEVALBUTEROL HYDROCHLORIDE 1.25 MG: 1.25 SOLUTION RESPIRATORY (INHALATION) at 19:57

## 2022-08-03 RX ADMIN — ACETYLCYSTEINE 2 ML: 200 INHALANT RESPIRATORY (INHALATION) at 08:28

## 2022-08-03 RX ADMIN — MYCOPHENOLATE MOFETIL 500 MG: 200 POWDER, FOR SUSPENSION ORAL at 08:36

## 2022-08-03 RX ADMIN — NYSTATIN: 100000 CREAM TOPICAL at 08:37

## 2022-08-03 RX ADMIN — TOPICAL ANESTHETIC 1 SPRAY: 200 SPRAY DENTAL; PERIODONTAL at 12:45

## 2022-08-03 RX ADMIN — METOPROLOL TARTRATE 25 MG: 25 TABLET, FILM COATED ORAL at 20:23

## 2022-08-03 RX ADMIN — ACETAMINOPHEN 650 MG: 325 TABLET, FILM COATED ORAL at 02:12

## 2022-08-03 RX ADMIN — TACROLIMUS 2 MG: 5 CAPSULE ORAL at 08:36

## 2022-08-03 RX ADMIN — INSULIN ASPART 1 UNITS: 100 INJECTION, SOLUTION INTRAVENOUS; SUBCUTANEOUS at 00:05

## 2022-08-03 RX ADMIN — CALCIUM CARBONATE 600 MG (1,500 MG)-VITAMIN D3 400 UNIT TABLET 1 TABLET: at 11:18

## 2022-08-03 RX ADMIN — MIDAZOLAM 1 MG: 1 INJECTION INTRAMUSCULAR; INTRAVENOUS at 12:46

## 2022-08-03 RX ADMIN — HYDROXYZINE HYDROCHLORIDE 50 MG: 25 TABLET ORAL at 20:24

## 2022-08-03 RX ADMIN — NYSTATIN 1000000 UNITS: 100000 SUSPENSION ORAL at 22:35

## 2022-08-03 RX ADMIN — MYCOPHENOLATE MOFETIL 500 MG: 200 POWDER, FOR SUSPENSION ORAL at 20:24

## 2022-08-03 RX ADMIN — VALGANCICLOVIR HYDROCHLORIDE 450 MG: 50 POWDER, FOR SOLUTION ORAL at 08:36

## 2022-08-03 RX ADMIN — ONDANSETRON 4 MG: 4 TABLET, ORALLY DISINTEGRATING ORAL at 08:35

## 2022-08-03 RX ADMIN — Medication 1 PACKET: at 14:08

## 2022-08-03 RX ADMIN — FENTANYL CITRATE 50 MCG: 50 INJECTION, SOLUTION INTRAMUSCULAR; INTRAVENOUS at 12:46

## 2022-08-03 RX ADMIN — DAPSONE 50 MG: 25 TABLET ORAL at 11:19

## 2022-08-03 RX ADMIN — ACETAMINOPHEN 975 MG: 325 TABLET, FILM COATED ORAL at 20:23

## 2022-08-03 RX ADMIN — OXYCODONE HYDROCHLORIDE 10 MG: 5 TABLET ORAL at 02:12

## 2022-08-03 RX ADMIN — MIDAZOLAM 1 MG: 1 INJECTION INTRAMUSCULAR; INTRAVENOUS at 12:52

## 2022-08-03 RX ADMIN — PREDNISONE 12.5 MG: 2.5 TABLET ORAL at 08:35

## 2022-08-03 RX ADMIN — NYSTATIN 1000000 UNITS: 100000 SUSPENSION ORAL at 08:36

## 2022-08-03 RX ADMIN — LEVALBUTEROL HYDROCHLORIDE 1.25 MG: 1.25 SOLUTION RESPIRATORY (INHALATION) at 14:29

## 2022-08-03 RX ADMIN — LEVALBUTEROL HYDROCHLORIDE 1.25 MG: 1.25 SOLUTION RESPIRATORY (INHALATION) at 08:27

## 2022-08-03 RX ADMIN — PREDNISONE 12.5 MG: 2.5 TABLET ORAL at 20:23

## 2022-08-03 RX ADMIN — OXYCODONE HYDROCHLORIDE 10 MG: 5 TABLET ORAL at 20:23

## 2022-08-03 RX ADMIN — ACETAMINOPHEN 975 MG: 325 TABLET, FILM COATED ORAL at 14:05

## 2022-08-03 RX ADMIN — HYDROXYZINE HYDROCHLORIDE 50 MG: 25 TABLET ORAL at 08:35

## 2022-08-03 RX ADMIN — THIAMINE HCL TAB 100 MG 100 MG: 100 TAB at 11:19

## 2022-08-03 RX ADMIN — NYSTATIN: 100000 CREAM TOPICAL at 20:24

## 2022-08-03 RX ADMIN — ACETYLCYSTEINE 2 ML: 200 INHALANT RESPIRATORY (INHALATION) at 19:57

## 2022-08-03 RX ADMIN — ACETYLCYSTEINE 2 ML: 200 INHALANT RESPIRATORY (INHALATION) at 14:29

## 2022-08-03 ASSESSMENT — ACTIVITIES OF DAILY LIVING (ADL)
ADLS_ACUITY_SCORE: 30
ADLS_ACUITY_SCORE: 32
ADLS_ACUITY_SCORE: 30
ADLS_ACUITY_SCORE: 32
ADLS_ACUITY_SCORE: 30
ADLS_ACUITY_SCORE: 32

## 2022-08-03 NOTE — PROGRESS NOTES
Therapy: Enteral TF  Insurance: [place] and [place]     Patient meets Medicare criteria for Enteral TF. Medicare will cover 80% and her [place] will cover 100% with a monthly ded of $3.55.    Anticipated length of need of 90 days or more must be documented in order for Medicare to cover.      Please contact Intake with any questions, [phone] or In Basket pool, [facility] ([phone]).

## 2022-08-03 NOTE — PROGRESS NOTES
CLINICAL NUTRITION SERVICES - REASSESSMENT NOTE     Nutrition Prescription    RECOMMENDATIONS FOR MDs/PROVIDERS TO ORDER:  - Total daily fluids/adjustments per MD  - Minimize disruptions to EN regimen    Malnutrition Status:    Patient does not meet two of the established criteria necessary for diagnosing malnutrition but is at risk for malnutrition    Recommendations already ordered by Registered Dietitian (RD):  Continue current EN as able: Novasource Renal @ 35 ml/hr (840 ml) + 1 pkt Prosource    Future/Additional Recommendations:  - Monitor tolerance of EN   - Monitor for diet advancement, provided nutrition education regarding gastroparesis as able.      EVALUATION OF THE PROGRESS TOWARD GOALS   Diet: NPO for procedure   Nutrition Support: Novasource Renal @ 35 ml/hr (840 ml) + 1 pkt Prosource daily provides 1720 kcal (30 kcal/kg), 87 g pro (1.5 g/kg), 154 g CHO, 602 ml free water, and 0 g fiber daily   Free Water: 30 mL q4 hours  Intake: 7 day average EN of 654 mL/day + 1 pkt Prosource/day. Provides 1348 kcal (24 kcal/kg) and 71 g PRO (1.3 g/kg). Meets 96% estimated energy needs and 95% estimated protein needs.   Pt ordering small meals of foods like jello and chicken broth.   Pt busy with other providers x3 at this time.      NEW FINDINGS   Weight: 70 kg on 8/2, weight stable since admit. Variations weight likely due to standing vs bed scale and fluid shifts.     Labs:   BUN 73.5 (H)    Meds:   Caltrate  Novolog- correction scale  Lantus Pen- 7 units BID  Thera-vit  Zofran   Thiamine     GI: Coffee ground Gtube output, endoscopy today     MALNUTRITION  % Intake: No decreased intake noted- EN  % Weight Loss: None noted  Subcutaneous Fat Loss: None observed- per RD note from 7/20  Muscle Loss: None observed- per RD note from 7/20  Fluid Accumulation/Edema: Mild  Malnutrition Diagnosis: Patient does not meet two of the established criteria necessary for diagnosing malnutrition but is at risk for  malnutrition    Previous Goals   Total avg nutritional intake to meet a minimum of 25 kcal/kg and 1.3 g PRO/kg daily (per dosing wt 56 kg).  Evaluation: Not met for energy needs    Previous Nutrition Diagnosis  Inadequate oral intake related to NPO status as evidenced by need for EN to meet 100% nutrition needs.    Evaluation: No change    CURRENT NUTRITION DIAGNOSIS  Inadequate oral intake related to NPO status as evidenced by need for EN to meet 100% nutrition needs.      INTERVENTIONS  Implementation  Enteral Nutrition - Continue as ordered     Goals  Total avg nutritional intake to meet a minimum of 25 kcal/kg and 1.3 g PRO/kg daily (per dosing wt 56 kg).    Monitoring/Evaluation  Progress toward goals will be monitored and evaluated per protocol.    Loida Norris, RD, LD  6D RD pager [phone]  Weekend/ED RD pager [phone]

## 2022-08-03 NOTE — PROGRESS NOTES
"  Brief CVTS Progress Note  08/3/2022     Sofie Rodriguez is a 62 year old female with PMH significant for oxygen-dependent COPD, HF, HTN, HCV, osteopenia, and methamphetamine abuse in remission.  She is now s/p BSLT by Dr. Sunshine on 6/28/2022.  Her post-operative course has been complicated by LUE critical limb ischemia now s/p left radial thrombectomy on 7/1/2022, hypercapneic respiratory insufficiency, BACILIO, and dysphagia.     A/P:  S/p BLST on 6/28/2022, post op course c/b LUE critical limb ischemia now s/p left radial thrombectomy on 7/1/2022, hypercapneic respiratory insufficiency, BACILIO, and dysphagia.    -Hgb<7 7/30, responded appropriately s/p one unit pRBCs     - Chest tube output past 24h from the right 200 ml,  No drainage from left.    - Keep right pleural chest tube for drainage. No air leaks.   - IR-placed left apical pigtail chest tube on 7/25; management per Pulmonology  - Per surgeon, lungs did not fill chest space  - Daily wound care per nursing:  Wound cleanser to clamshell incision, pat dry, may be left open to air; keep incision C/D/I  - Remainder of plan per Pulm Transplant/Medicine teams  - Bronch scheduled for today.      Discussed with Dr. Rivas as needed    Penny Mallory PA-C  Cardiothoracic Surgery  Pager [phone]  August 3, 2022         Interval History:   No acute complaints          Physical Exam:     Vitals: /68 (BP Location: Right arm)   Pulse 108   Temp 98.6  F (37  C) (Oral)   Resp 19   Ht 1.575 m (5' 2\")   Wt 70 kg (154 lb 5.2 oz)   SpO2 100%   BMI 28.23 kg/m    BMI= Body mass index is 28.23 kg/m .    Gen: A&Ox4, NAD  Neuro: no focal deficits   CV: NSR on tele.   Pulm: Breathing regular and non-labored  Incision: C/D/I, healing nicely             Data:    Imaging:  Recent Results (from the past 24 hour(s))   XR Chest 2 Views    Narrative    Chest 2 views    INDICATION: Interval follow-up lung transplant, bilateral chest tubes    COMPARISON: " Yesterday    Findings: Clamshell sternotomy from prior bilateral lung transplant  again noted. Left pleural effusion with possible loculation component  unchanged. Atelectasis or mild edema in the right upper lung zones  with another atelectasis in the left upper lung unchanged. Left chest  catheter again present. Right basilar thoracostomy tube again present.  Loculated right hydropneumothorax unchanged.      Impression    IMPRESSION: No significant interval change with underlying bilateral  lung transplant.    ALVINA HARRY MD         SYSTEM ID:  [id number]      8/1/22 10:20 AM [id number] [facility]        PACS Images     Show images for XR Chest 2 Views           Study Result    Narrative & Impression   EXAM:  XR CHEST 2 VIEWS     INDICATION: interval follow up, lung transplant, bilateral chest tubes     COMPARISON:  Chest x-ray 7/31/2022     HISTORY:  History of end-stage COPD S/P bilateral lung transplant  6/20/2022. Lungs were noted to be slightly undersized.     FINDINGS:  PA and lateral views of the chest. Postsurgical chest with intact  clamshell thoracotomy wires. Right basilar and left apical chest  tubes, stable. Left-sided central venous catheter terminating over the  subclavian vein, stable.     Cardiomediastinal silhouette within normal limits. Post surgical  changes of bilateral lung transplant. Stable perihilar opacities.  Right hydropneumothorax with decreased size of air locule. Left  hydropneumothorax with decreased but persistent air locule and some  increased fluid component. Unremarkable upper abdomen. No acute bony  lesions.                                                                      IMPRESSION:  1.  Right loculated hydropneumothorax with decreased size of the right  air locule overlying the minor fissure.  2.  Slightly increased fluid component with stable air component of  the left loculated hydro-pneumothorax.     I have personally reviewed the examination  and initial interpretation  and I agree with the findings.     ALVINA HARRY MD          BMP  Recent Labs   Lab 08/03/22  1536 08/03/22  1416 08/03/22  1349 08/03/22  0756 08/03/22  0633 08/02/22  0902 08/02/22  0757 08/01/22  1218 08/01/22  0832 07/31/22  0942 07/31/22  0622   NA  --   --   --   --  140  --  141  --  140  --  138   POTASSIUM  --   --   --   --  5.1  --  4.6  --  4.2  --  4.5   CHLORIDE  --   --   --   --  94*  --  94*  --  91*  --  89*   ESTUARDO  --   --   --   --  9.2  --  9.0  --  9.0  --  8.9   CO2  --   --   --   --  45*  --  47*  --  47*  --  46*   BUN  --   --   --   --  73.5*  --  76.0*  --  80.2*  --  88.3*   CR  --   --   --   --  1.39*  --  1.40*  --  1.67*  --  1.86*   * 73 64* 103* 113*   < > 96   < > 144*   < > 152*    < > = values in this interval not displayed.     CBC  Recent Labs   Lab 08/03/22  0633 08/02/22  1311 08/02/22  0757 07/31/22  0622 07/30/22  0350   WBC 5.4  --  5.5 6.3 5.9   RBC 2.11*  --  2.45* 2.61* 2.08*   HGB 6.5* 7.4* 7.5* 8.0* 6.7*   HCT 22.2*  --  25.4* 26.7* 22.4*   *  --  104* 102* 108*   MCH 30.8  --  30.6 30.7 32.2   MCHC 29.3*  --  29.5* 30.0* 29.9*   RDW 18.6*  --  18.5* 19.1* 16.3*     --  216 252 244     INR  Recent Labs   Lab 08/03/22  0633   INR 0.87      Hepatic Panel   Recent Labs   Lab 08/01/22  0832 07/28/22  0925   AST 22 17   ALT 12 12   ALKPHOS 68 60   BILITOTAL 0.2 0.2   ALBUMIN 2.8* 3.0*     Glucose  Recent Labs   Lab 08/03/22  1536 08/03/22  1416 08/03/22  1349 08/03/22  0756 08/03/22  0633 08/03/22  0448   * 73 64* 103* 113* 110*

## 2022-08-03 NOTE — OR NURSING
EGD with no interventions performed under moderate sedation, patient tolerated well. Transported back to  and report called to RN.

## 2022-08-03 NOTE — PROGRESS NOTES
Care Management Follow Up    Length of Stay (days): 35    Expected Discharge Date: 08/08/2022     Concerns to be Addressed:       Patient plan of care discussed at interdisciplinary rounds: Yes    Anticipated Discharge Disposition: Other (Comments)     Anticipated Discharge Services:    Anticipated Discharge DME:      Patient/family educated on Medicare website which has current facility and service quality ratings:    Education Provided on the Discharge Plan:    Patient/Family in Agreement with the Plan:      Referrals Placed by CM/SW:  [facility] for TF  Private pay costs discussed: Per [facility] benefits check, patient has 100% coverage, qualifies for home TF.    Additional Information:  RNCC received call from [facility], with which RNCC coordinated with txp coordinator for patient's family learning for txp education and with PLC for TF education to occur on Friday with Julia pt's daughter. Patient still has 2 chest tubes. Pt possibly getting scope done today. RNCC continues to follow.    Update: Patient's Transplant education with daughters will occur at 9 a.m. on Friday, Pharmacy education at 10 a.m., and PLC PEG J education at 11 a.m. on Friday.    Update: Pt still needs DM educator consult order, provider paged for this.     [facility]  Phone # [phone]  Fax # [phone]     THUAN Van, BA, RN, CMSRN, RNCC  Covering Units 6D/OBS  Pager: [phone]  Phone: [phone]  6th floor Weekend/Holiday Pager: [phone]  Observation weekend/after hours: [phone]

## 2022-08-03 NOTE — PROGRESS NOTES
[facility]    Medicine Progress Note - Hospitalist Service, GOLD TEAM 10    Date of Admission:  6/28/2022    Assessment & Plan        Sofie Rodriguez is a 62 year old female admitted on 6/28/2022. She has PMH of end stage COPD s/p b/l lung transplant on 6/28/2022, HTN, HFpEF, h/o hepC, osteopenia, and former methamphetamine use, and she was transferred to [place] [address] Medicine from MICU on 7/15/2022. She was admitted to the MICU on 7/13/20222 with prior hospital course complicated by left upper extremity acute limb ischemia s/p left radial thrombectomy on 7/1/2022. She was primarily treated for acute hypercapnic respiratory failure on intermittent BIPAP with worsening BACILIO while in the MICU. Breathing is improving, but patient has severely delayed gastric emptying found on NM study. PEGJ placed  Still awaiting chest tube removal and will discharge to ARU vs TCU when able.      Today's Plan:  -transfused 1 unit(s)  -hgb recheck this evening  -EGD with GI who found pyloric junction ulcer - recommended continue BID PPI IV, strict NPO due to outlet obstruction from swelling, meds and feeds via J tube, G tube to gravity continuously  -pulm clamping left chest tube in anticipation of removing soon  -wean off oxygen- had desat this afternoon    End stage COPD s/p BSLT 6/28/2022  Acute hypercapnic hypoxic respiratory failure (improving)  EBV Viremia  likely 2/2 decreased central respiratory drive vs respiratory muscle weakness and some pulmonary edema / fluid Transplanted 6/28. formal SNIFF test was performed on 7/14 showed R hemidiaphragm palsy. Of note transplanted lungs were also considered small for body size and could contribute to decreased respiratory compensation for hypercarbia. VBG relatively stable (CO2 elevated but compensated with bicarb)  - oxycodone 5-10 mg q4h PRN  - encourage activity and getting up in bed; PT/OT participation  - encourage chest physiotherapy QID  -  weaned off Bipap at night  Immunosuppression:  - Prednisone per pulm  - Tacrolimus per pulm  -  BID  Prophylaxis:  - CMV - Valgancyclovir  - Thrush - PO nysatin  - PJP - TMP-SMX; dapsone started 7/18 at 50mg qMWF     Severe Gastroparesis - gastric emptying study 7/20  - PO vanc 125mg QID 7/12 to present  - gastric emptying study 7/20 showed delayed gastric emptying with retention of 95% of stomach contents after 4 hours; please keep patient NPO except tube feeds and meds  - Simethicone scheduled  - 7/27: PEGJ placed     Peptic ulcer at the pyloric junction with acute blood loss anemia in the setting of acute on chronic anemia-- had acute blood loss anemia after transplant-- had stabilized. Gradual hgb downtrend, then after inspection bronch her G tube was hooked back up to gravity and large brown/black output seen. EGD on 8/3 showed pyloric junction ulcer and swelling causing gastric outlet objection  -GI consult, appreciate assistance  -BID PPI switched to IV (was already on BID PPI PO  -NPO strict due to outlet obstruction and poor motility  -pause heparin drip- likely resume 8/4 with easy ability to stop of bleeding starts   consulted GI and stopped heparin drip  - hgb transfusion 8/3-- recheck in afternoon then daily  -change all meds to J tube admin  -J feeds nikki SANTOS tube  Feeding regimen:   - Adult Formula Drip Feeding: Continuous Novasource Renal; Nasojejunal; Goal Rate: 35; initiate at goal rate; mL/hr; Medication - Feeding Tube Flush Frequency: At least 15-30 mL water before and after medication administration and with tube clogging    Pleural Effusion, Right and Left  - 1 R chest tube (basilar) in place currently (pericardial drain was removed 7/15, L basilar was removed 7/20). CT chest 7/14 showing unchanged bilateral effusions and unchanged biapical pneumothoraces with resolved left basilar pneumothorax; bibasilar chest tubes in place with pericardial drain (which was removed 7/15).   - daily  CXR  - RIGHT Chest Tube - still with outputs noted  - LEFT Apical Fluid collection - IR targeting via CT guided pigtail placement-- suspected clamping on 8/3, possible removal 8/4     Hypotension, resolved  H/o HTN  H/o HFpEF  Likely vasoplegia post operatively. Last echo 7/7 normal EF with good LV function. S/p hydrocortisone 7/6-7/9 and fludrocortisone 7/6-7/11 without significant improvement.  - off midorine 7/19  - Holding PTA lasix 20mg daily-- diuresis intermittently    BACILIO-- recurrent, improved 8/1-- likely due to supratherapeutic tacrolimus  Hypermagnesemia  Hyperphosphatemia  Metabolic alkalosis  Baseline renal function was normal prior to surgery. New onset renal failure after transplant, likely multifactorial due to pre-renal hypotension, less likely nephrotoxic agents. Overall kidney function improving. Today, Cr fluctuating but BUN increasing, with unclear urine output (7/22).   - HD cath removed 7/22, trend metabolites  - Adequate UOP despite Cr increase  - 8/1: 500 ml NS per Renal recs- tolerated; held fluid/diuresis on 8/2  - 8/2- will give small fluid bolus since she was NPO for large portion of day + having increased GI losses with her gastric outlet obstruction + venting    C.diff - vanco started 7/12, course completed    Tachyarrthymia  Paroxysmal afib  Paroxysmal aflutter/AT  SVT vs afib.Had an occurrence during HD on 7/14. Had afib with RVR to 200s on 7/17. EP consult placed.asmyptomatic and stable during episodes. Aflutter episode (7/17) with transfer. Vagal maneuver responsive at time. No recent tachyarrhythmia episodes (7/22).   - Per EP: patient has had episodes of possible flutter (vs AT with 2:1 conduction) and afib with RVR  - heparin drip (temporarily stopped due to suspected upper GI bleeding)-- eventual plan to transition to DOAC after PEGJ placement and chest tubes resolved (CHADS-VASc score of 2)  - On telemetry  - On metoprolol tartrate 25mg BID  - Discharge on ziopatch for 14 days  with EP follow up in 1-2 months after     Stress-induced hyperglycemia with intermittent hypoglycemia (8/3)  Has been controlled on 13-15 units of glargine BID  - on 15U glargine BID  --decreasing glargine in prep for NPO status/procedure on 8/3  --decreased further given her noted blood glucose control on 8/3       Diet: Adult Formula Drip Feeding: Continuous Novasource Renal; Jejunostomy; Goal Rate: 35; mL/hr; Medication - Feeding Tube Flush Frequency: At least 15-30 mL water before and after medication administration and with tube clogging; Amount to Send (Nutri...  NPO for Medical/Clinical Reasons Except for: No Exceptions    DVT Prophylaxis: Pneumatic Compression Devices  Dong Catheter: Not present  Central Lines: PRESENT       Cardiac Monitoring: None  Code Status: Full Code      Disposition Plan      Expected Discharge Date: 08/08/2022      Destination: home  Discharge Comments: TCU, lot's of delays  - Chest tubes still in place        The patient's care was discussed with the Bedside Nurse, Patient and pulm, GI Consultant.    Gaby Malhotra MD  Hospitalist Service, [address]  Securely message with the Vocera Web Console (learn more here)  Text page via [facility] Paging/Directory   Please see signed in provider for up to date coverage information      Clinically Significant Risk Factors Present on Admission                      ______________________________________________________________________    Interval History   No events overnight. Remained NPO prior to EGD today. No significant gastric output overnight. No nausea/emesis. No shortness of breath. Cough present but not significantly changed. No fever/chills. Having stools. No other complaints.   Had hypoglycemia in afternoon after EGD- responded to some apple juice and J tube feeds were resumed.    Data reviewed today: I reviewed all medications, new labs and imaging results over the last 24  hours. I personally reviewed no images or EKG's today.    Physical Exam   Vital Signs: Temp: 98.7  F (37.1  C) Temp src: Oral BP: 111/61 Pulse: 101   Resp: 14 SpO2: 100 % O2 Device: Nasal cannula Oxygen Delivery: 2 LPM  Weight: 154 lbs 5.15 oz  Constitutional: Awake, alert and in no distress. Sitting up comfortably in bed   Head: Normocephalic. Atraumatic.   EENT: No conjunctival injection or icterus. Nares patent. Moist mucous membranes. Nasal cannula in place  Cardiovascular: mildly tachycardic but normal rhythm. No murmurs, clicks, gallops or rubs. No edema   Respiratory: Clear to auscultation without wheezes or crackles. Normal respiratory rate and effort.   Gastrointestinal: Abdomen soft, non-tender. BS normal. G tube venting with minimal dark brown/black output  Musculoskeletal: extremities normal- no gross deformities noted and normal muscle tone  Skin: no suspicious lesions, rashes, jaundice, or cyanosis   Psychiatric: mentation appears normal and affect normal/bright    Data   Recent Labs   Lab 08/03/22  1416 08/03/22  1349 08/03/22  0756 08/03/22  0633 08/02/22  1526 08/02/22  1311 08/02/22  0902 08/02/22  0757 08/01/22  1218 08/01/22  0832 07/31/22  0942 07/31/22  0622 07/28/22  1049 07/28/22  0925   WBC  --   --   --  5.4  --   --   --  5.5  --   --   --  6.3   < > 3.9*   HGB  --   --   --  6.5*  --  7.4*  --  7.5*  --   --   --  8.0*   < > 8.7*   MCV  --   --   --  105*  --   --   --  104*  --   --   --  102*   < > 110*   PLT  --   --   --  202  --   --   --  216  --   --   --  252   < > 240   INR  --   --   --  0.87  --   --   --   --   --   --   --   --   --   --    NA  --   --   --  140  --   --   --  141  --  140  --  138   < > 144   POTASSIUM  --   --   --  5.1  --   --   --  4.6  --  4.2  --  4.5   < > 4.1   CHLORIDE  --   --   --  94*  --   --   --  94*  --  91*  --  89*   < > 90*   CO2  --   --   --  45*  --   --   --  47*  --  47*  --  46*   < > >50*   BUN  --   --   --  73.5*  --   --   --   76.0*  --  80.2*  --  88.3*   < > 88.1*   CR  --   --   --  1.39*  --   --   --  1.40*  --  1.67*  --  1.86*   < > 1.44*   ANIONGAP  --   --   --  1*  --   --   --  <1*  --  2*  --  3*   < >  --    ESTUARDO  --   --   --  9.2  --   --   --  9.0  --  9.0  --  8.9   < > 9.6   GLC 73 64* 103* 113*   < >  --    < > 96   < > 144*   < > 152*   < > 120*   ALBUMIN  --   --   --   --   --   --   --   --   --  2.8*  --   --   --  3.0*   PROTTOTAL  --   --   --   --   --   --   --   --   --  5.3*  --   --   --  5.2*   BILITOTAL  --   --   --   --   --   --   --   --   --  0.2  --   --   --  0.2   ALKPHOS  --   --   --   --   --   --   --   --   --  68  --   --   --  60   ALT  --   --   --   --   --   --   --   --   --  12  --   --   --  12   AST  --   --   --   --   --   --   --   --   --  22  --   --   --  17    < > = values in this interval not displayed.       Medications     dextrose Stopped (07/15/22 1801)     [Held by provider] heparin Stopped (08/02/22 0900)       acetaminophen  975 mg Oral or Feeding Tube TID     acetylcysteine  2 mL Nebulization TID     [Held by provider] aspirin  81 mg Oral Daily     calcium carbonate 600 mg-vitamin D 400 units  1 tablet Per Feeding Tube BID w/meals     dapsone  50 mg Oral or Feeding Tube Once per day on Mon Wed Fri     insulin aspart  1-12 Units Subcutaneous Q4H     insulin glargine  7 Units Subcutaneous BID     levalbuterol  1.25 mg Nebulization TID     metoprolol tartrate  25 mg Per Feeding Tube BID     multivitamin, therapeutic  1 tablet Per Feeding Tube Daily     mycophenolate  500 mg Oral or Feeding Tube BID     nystatin   Topical BID     nystatin  1,000,000 Units Swish & Swallow 4x Daily     ondansetron  4 mg Oral Daily     pantoprazole (PROTONIX) IV  40 mg Intravenous BID     predniSONE  12.5 mg Oral or Feeding Tube BID     protein modular  1 packet Per Feeding Tube Daily     sodium chloride (PF)  10 mL Intracatheter Q8H     sodium chloride (PF)  10 mL Intracatheter Q8H     sodium  chloride (PF)  3 mL Intracatheter Q8H     tacrolimus  2 mg Per G Tube QAM    And     tacrolimus  2 mg Per G Tube QPM     thiamine  100 mg Oral or Feeding Tube Daily     valGANciclovir  450 mg Oral or Feeding Tube Daily

## 2022-08-03 NOTE — PROGRESS NOTES
[facility] Brief Operative Note    Procedure:  Upper endoscopy   Post-operative diagnosis: Clean based ulcer at pyloric channel  Delayed gastric emptying    Staff Physician:  Dr. Ira Andres   Fellow/Assistant(s):  Tenzin Arroyo MD   Specimens:  Please see final procedure note for further details.   Findings:  The stomach had large amount of residual fluid/food obscuring visualization of gastric mucosa. Unable to successfully clear residual gastric contents due to amount present.    The area around gastric feeding tube was inspected with irritation and erythema under the balloon but no ulcer or stigmata of bleeding. Not unexpected from recent feeding tube placement.     There was a clean based ulcer at the pyloric channel with inflammation around the ulcer. There was narrowing at the pylorus but able to transverse with additional pressure.       Normal duodenum and esophagus.     Complications:  None.   Condition:  Stable   Recommendations  Diet:  Strict NPO  PPI:  PPI IV BID  Anti-coagulants/platelets:  - okay to resume anticoagulation tomorrow (8/4) but would prefer heparin drip over oral anticoagulation in case of repeat bleeding.   Octreotide:  N/A  Discharge Planning:   - return patient to [facility] for ongoing care    - Given large residual gastric contents, would recommend strict NPO to allow stomach to empty.   - vent the G tube   - Will continue to monitor and see if gastric emptying improves as ulcer and surrounding inflammation improves. If not, we may need to investigate need for treatment of pyloric narrowing.

## 2022-08-03 NOTE — PROGRESS NOTES
Pulmonary Medicine  Cystic Fibrosis - Lung Transplant Team  Progress Note  2022     Patient: Sofie Rodriguez  MRN: [id number]  : 1960 (age 62 year old)  Transplant: 2022 (Lung), POD#36  Admission date: 2022    Assessment & Plan:     Sofie Rodriguez is a 62 year old female with a PMH significant for end-stage COPD, HTN, HFpEF, Mycobacterium peregrinum colonization, h/o hepatitis C, HECTOR s/p LEEP procedure, osteopenia, and former methamphetamine use.  Pt. is now s/p BSLT on 22, lungs slightly undersized.   Persistent low dose pressor needs post-op through 7/10.  Extubated POD #2 but with persistent hypercapnia, mostly compensated and only slightly improved with intermittent BiPAP.  Also with left radial artery thrombus (presumed secondary to arterial line) s/p thrombectomy /, BACILIO, and C diff.  Rehabilitation and airway clearance initially limited by dysrhythmia and gastric discomfort, now with gradual improvement.  S/p GJ tube placement in IR .  EGD today for coffee ground g tube return.  Found to have pyloric ulcer.      Today's recommendations:  - Wean supplemental O2 as able--would encourage patient to do so  - Plan to clamp left chest tube today (right chest tube managed by CVTS, output remains moderate)  - Volume status, per primary team (small fluid bolus yesterday per nephrology)  - Inspection bronch  to evaluate anastomoses with normal airway and no secretions  - Repeat tacro level today is sub therapeutic   --dose increase ordered   --repeat level  ordered  - Prednisone taper due  ordered  - G tube to gravity drainage    --retained fluid and food on EGD today  - discontinue daily VBG     S/p bilateral sequential lung transplant (BSLT) for end stage COPD:  Persistent hypercapneic respiratory failure:   Persistent hypotension, Resolved:   Bilateral hydroPTX:  Right hemidiaphragm palsy: Explant pathology with severe emphysema with subpleural bullae formation, changes  of chronic bronchitis, subpleural scars and patchy pulmonary edema; benign hilar lymph nodes.  No evidence of PGD post-op.  Extubated 6/30.  Persistent hypercapnia without dyspnea, appears to be a respiratory drive problem.  Persistent low dose pressors weaned off 7/10 and scheduled midodrine topped 7/19.  SNIFF (7/14) notable for right hemidiaphragm palsy.  Bronch (7/19) with slight graying of mucosa noted at right anastomosis and scant secretions (slightly increased in RLL).  Chest CT (7/23) with increased loculated fluid within the left hemithorax with specks of air density in the loculated fluid, similar appearing loculated right hydroPTX with minimal decrease in fluid component (right chest tube appears to be outside the loculated hydroPTX), increased size of pleural based lesion in MARLON, and mild subcutaneous emphysema along right chest tube with minimal along tract of removed left chest tube.  S/p left apical chest tube placed by IR 7/25, exudative (right surgical tube also remains, output moderate).  NIPPV overnight for persistent hypercapnia, stopped given lack of improvement with therapy, compensated hypercapnia persists.  On 0.5-2L NC.   - Ammonia monitoring twice weekly (screening for hyperammonemia post-lung transplant)  - Nebs: levalbuterol and Mucomyst TID  - Pulmonary toilet with chest physiotherapy TID (decreased 7/28)  - Aerobika and incentive spirometry hourly while awake  - Supplemental oxygen as needed to maintain SpO2 >92% (wean as able)  - would discontinued daily VBG   - Right chest tube (managed by surgical team) and left apical chest tube (placed by IR), plan to clamp left chest tube today  - CXR reviewed by me  - Volume management per primary team  - Repeat inspection bronch 8/2 to evaluate anastomoses--normal     Immunosuppression:  Induction therapy with basiliximab (and high dose IV steroid).  - Tacrolimus Goal level 8-12.  Repeat level 8/6  -  mg BID (decreased 7/27, GI  symptoms/leukopenia)  - Prednisone 12.5 mg BID, next taper due 8/4 (ordered)  Date AM dose (mg) PM dose (mg)   7/28/22 12.5 12.5   8/4/22 12.5 10   8/18/22 10 10   9/15/22 10 7.5   10/13/22 7.5 7.5   11/10/22 7.5 5   12/8/22 5 5   1/5/23 5 2.5      Prophylaxis:   - Dapsone qMWF for PJP ppx (7/18), defer increasing to daily pending hgb stability (once consistently >8)  - VGCV for CMV ppx, CMV negative 7/25  - Nystatin for oral candidiasis ppx, 6 month course  - See below for serologies and viral ppx:    Donor Recipient Intervention   CMV status Positive Positive Valganciclovir POD #8-90   EBV status Positive Positive EBV monitoring as below   HSV status N/A Positive Not indicated      ID:  H/o M. peregrinum colonization: Donor bronch cultures (OSH) with Strep beta hemolytic (not group A).  Recipient cultures as below.  Bilateral pleural cultures (right 7/20, left 7/25) NGTD.  - AFB to be sent on all future bronchs (NGTD post-transplant)     Streptococcus pneumoniae:  Stenotrophomonas maltophilia: Noted in recipient cultures at time of transplant.  S/p ceftazidime 6/28-7/10, vancomycin 7/7-7/8 and 6/28-6/30, and levofloxacin 7/10-7/12 for total 2 week ABX course.     Hypogammaglobulinemia: IgG adequate at time of transplant, repeat level low (336) on 7/28.  S/p IVIG dosing with premedication on 7/30, tolerated well.  - Repeat IgG in one month (8/28)     H/o hepatitis C:  Diagnosed in 1980s s/p 2m treatment, quant negative since 10/2017, last positive 2/20/17 ([phone]).  Ab positive on 6/2021 with negative HCV PCR.  H/o remote EtOH abuse.  MR elastography (4/27/21) with hepatology review and consult without any concerns post transplant.  Hepatitis C RNA negative and hepatitis C antibody positive (old) on 6/28.     EBV viremia: EBV level 11k, log 4.1 on 7/28.  Not likely to be clinically significant.  - Monitor monthly (due 8/28)     Other relevant problems managed by primary team:      SVT:   Aflutter with RVR: SVT  first noted on 7/14, prior to HD line placement.  Continues intermittently.  Aflutter with RVR to 200 7/17 triggered by activity.  EP consulted 7/17 given persistent tachycardia/dysrhythmia.  Midodrine held 7/19.  - Metoprolol per primary team (started 7/15)  - Heparin drip (7/17), defer transition to DOAC at this time given chest tubes     Left hand ischemia: Radial artery thrombosis identified on duplex doppler.  S/p thrombectomy on 7/2.   - AC per primary      BACILIO:   Hyperkalemia: Likely multifactorial including medications (Bactrim, tacrolimus) and hypotension.  Fludrocortisone 7/6-7/11.  Potassium now stable.  S/p non-tunneled HD line 7/14.  Initial iHD run 7/14 limited by afib with RVR vs SVT.  Last iHD run 7/16, line removed 7/22.  Creatinine increased since 7/29 with diamox and elevated tacro level.   - Tacrolimus monitoring as above  - Management per nephrology and primary team     Abdominal pain: Noted 7/15, cramping pain.  ACR with large stool burden in colon, no obstruction or distention.  Some nausea but no emesis.  CT abdomen (7/15) with moderate to large gastric distention, otherwise without obstruction.  NG placed for LIS with moderate to large output for several days.  Increased abdominal pain 7/17 after clamping for 4 hours, tolerated clamping today without issue.  Gastric emptying study (7/20) with severe gastric emptying delay (95% retention at 4 hours).  S/p GJ tube placement in IR 7/27.  - Simethicone prn  - TF management per RD and primary team  - TF via J tube, G tube to gravity drainage      C diff infection: Abdominal pain with diarrhea noted 7/8, AXR without dilated bowel, moderate colonic stool burden.  C diff positive 7/11.  Loose stools (on tube feeds) stable.  S/p PO vancomycin (7/11-7/28).  - Low threshold to resume in the future with ABX course     Hypomagnesemia: Suppressed absorption d/t CNI.   - Continue daily magnesium with replacement protocol prn, schedule oral magnesium  "supplementation if needed pending improvement in stools     Anemia: Suspect from gi source and frequent blood draws     We appreciate the excellent care provided by the [place] [id number] team.  Recommendations communicated via in person rounding and this note.  Will continue to follow along closely, please do not hesitate to call with any questions or concerns.    Monik Paredes MD MPH  Associate Professor of Medicine  Pager [phone]     Subjective & Interval History:     Patient feeling good this am.  Wants to avoid further procedures and just spend time healing.  Denies cough or shortness of breath.  Pain at chest tube sites.    Review of Systems:     C: No fever, no chills,  NPO  INTEGUMENTARY/SKIN: No rash or obvious new lesions  ENT/MOUTH: No sore throat, no sinus pain, no nasal congestion or drainage  RESP: See interval history  CV: + chest pain, + peripheral edema  GI: No nausea, no vomiting, no change in stools, no reflux symptoms  : No dysuria  ENDOCRINE: Blood sugars with adequate control  PSYCHIATRIC: Mood stable    Physical Exam:     All notes, images, and labs from past 24 hours (at minimum) were reviewed.    Vital signs:  Temp: 98.6  F (37  C) Temp src: Oral BP: 128/68 Pulse: 108   Resp: 19 SpO2: 100 % O2 Device: Nasal cannula Oxygen Delivery: 2 LPM Height: 157.5 cm (5' 2\") Weight: 70 kg (154 lb 5.2 oz)  I/O:     Intake/Output Summary (Last 24 hours) at 8/3/2022 1633  Last data filed at 8/3/2022 1500  Gross per 24 hour   Intake 1165 ml   Output 515 ml   Net 650 ml     Constitutional: Sitting in bed, in no apparent distress.   HEENT: Eyes with pink conjunctivae, anicteric.  Oral mucosa moist without lesions.  Neck supple without lymphadenopathy.   PULM: Fair air flow bilaterally.  No crackles, no rhonchi, no wheezes.  CV: Normal S1 and S2.  RRR.  No murmur, gallop, or rub.  + peripheral edema.   ABD: NABS, soft, nontender, nondistended.    MSK: Moves all extremities.  + apparent muscle " wasting.   NEURO: Alert and oriented, conversant.   SKIN: Warm, dry.  No rash on limited exam.   PSYCH: Mood stable.     Lines, Drains, and Devices:  Peripheral IV 07/20/22 Anterior;Right Lower forearm (Active)   Site Assessment [facility] 08/03/22 1600   Line Status Saline locked 08/03/22 1600   Dressing Intervention New dressing  07/20/22 1548   Phlebitis Scale 0-->no symptoms 08/03/22 1600   Infiltration Scale 0 08/03/22 1600   Number of days: 14       Midline Catheter Double Lumen (Active)   Site Assessment [facility] 08/03/22 1600   External Cath Length (cm) 2 cm 07/28/22 1455   Initial Extremity Circumference (cm) 29 cm 07/28/22 1455   Dressing Intervention Chlorhexidine patch;Transparent 08/03/22 0000   Line Necessity Yes, meets criteria 08/03/22 1600   Dressing Change Due 08/04/22 08/02/22 0400   Purple - Status saline locked 08/03/22 1600   Purple - Cap Change Due 08/03/22 08/02/22 0400   Red - Status blood return noted;saline locked 08/03/22 1600   Red - Cap Change Due 08/03/22 08/02/22 0400   Extravasation? No 08/03/22 1600   Number of days: 6     Data:     LABS    CMP:   Recent Labs   Lab 08/03/22  1536 08/03/22  1416 08/03/22  1349 08/03/22  0756 08/03/22  0633 08/02/22  0902 08/02/22  0757 08/01/22  1218 08/01/22  0832 07/31/22  0942 07/31/22  0622 07/28/22  1049 07/28/22  0925   NA  --   --   --   --  140  --  141  --  140  --  138   < > 144   POTASSIUM  --   --   --   --  5.1  --  4.6  --  4.2  --  4.5   < > 4.1   CHLORIDE  --   --   --   --  94*  --  94*  --  91*  --  89*   < > 90*   CO2  --   --   --   --  45*  --  47*  --  47*  --  46*   < > >50*   ANIONGAP  --   --   --   --  1*  --  <1*  --  2*  --  3*   < >  --    * 73 64* 103* 113*   < > 96   < > 144*   < > 152*   < > 120*   BUN  --   --   --   --  73.5*  --  76.0*  --  80.2*  --  88.3*   < > 88.1*   CR  --   --   --   --  1.39*  --  1.40*  --  1.67*  --  1.86*   < > 1.44*   GFRESTIMATED  --   --   --   --  43*  --  43*  --  35*  --  30*   < > 41*    ESTUARDO  --   --   --   --  9.2  --  9.0  --  9.0  --  8.9   < > 9.6   MAG  --   --   --   --  2.4*  --  2.6*  --  2.7*  --  2.7*   < > 2.5*   PHOS  --   --   --   --  3.7  --  4.1  --  4.7*  --  5.2*   < > 3.8   PROTTOTAL  --   --   --   --   --   --   --   --  5.3*  --   --   --  5.2*   ALBUMIN  --   --   --   --   --   --   --   --  2.8*  --   --   --  3.0*   BILITOTAL  --   --   --   --   --   --   --   --  0.2  --   --   --  0.2   ALKPHOS  --   --   --   --   --   --   --   --  68  --   --   --  60   AST  --   --   --   --   --   --   --   --  22  --   --   --  17   ALT  --   --   --   --   --   --   --   --  12  --   --   --  12    < > = values in this interval not displayed.     CBC:   Recent Labs   Lab 08/03/22  0633 08/02/22  1311 08/02/22  0757 07/31/22  0622 07/30/22  0350   WBC 5.4  --  5.5 6.3 5.9   RBC 2.11*  --  2.45* 2.61* 2.08*   HGB 6.5* 7.4* 7.5* 8.0* 6.7*   HCT 22.2*  --  25.4* 26.7* 22.4*   *  --  104* 102* 108*   MCH 30.8  --  30.6 30.7 32.2   MCHC 29.3*  --  29.5* 30.0* 29.9*   RDW 18.6*  --  18.5* 19.1* 16.3*     --  216 252 244       INR:   Recent Labs   Lab 08/03/22  0633   INR 0.87       Glucose:   Recent Labs   Lab 08/03/22  1536 08/03/22  1416 08/03/22  1349 08/03/22  0756 08/03/22  0633 08/03/22  0448   * 73 64* 103* 113* 110*       Blood Gas:   Recent Labs   Lab 08/03/22  0633 08/02/22  0757 08/01/22  0832   PHV 7.39 7.36 7.42   PCO2V 78* 81* 75*   PO2V 29 38 34   HCO3V 47* 46* 49*   LINDY 20.4* 17.4* 21.8*   O2PER 35 35 35       Culture Data No results for input(s): CULT in the last 168 hours.    Virology Data:   Lab Results   Component Value Date    FLUAH1 Not Detected 06/29/2022    FLUAH3 Not Detected 06/29/2022    WN1428 Not Detected 06/29/2022    IFLUB Not Detected 06/29/2022    RSVA Not Detected 06/29/2022    RSVB Not Detected 06/29/2022    PIV1 Not Detected 06/29/2022    PIV2 Not Detected 06/29/2022    PIV3 Not Detected 06/29/2022    HMPV Not Detected 06/29/2022        Historical CMV results (last 3 of prior testing):  Lab Results   Component Value Date    CMVQNT Not Detected 07/25/2022     No results found for: CMVLOG    Urine Studies    Recent Labs   Lab Test 08/01/22  0319 07/08/22  0831 07/05/22  1004   URINEPH 8.0* 5.5 5.5   NITRITE Negative Negative Negative   LEUKEST Negative Negative Negative   WBCU  --  1 5       Most Recent [place] Pulmonary Function Testing (FVC/FEV1 only)  FVC-Pre   Date Value Ref Range Status   04/29/2022 1.82 L    11/11/2021 2.17 L    06/14/2021 2.00 L      FVC-%Pred-Pre   Date Value Ref Range Status   04/29/2022 58 %    11/11/2021 70 %    06/14/2021 64 %      FEV1-Pre   Date Value Ref Range Status   04/29/2022 0.51 L    11/11/2021 0.53 L    06/14/2021 0.54 L      FEV1-%Pred-Pre   Date Value Ref Range Status   04/29/2022 20 %    11/11/2021 21 %    06/14/2021 21 %        IMAGING    Recent Results (from the past 48 hour(s))   XR Chest 2 Views    Narrative    EXAM: XR CHEST 2 VIEWS 8/2/2022 1:55 PM    HISTORY: interval follow up, lung transplant, bilateral chest tubes.    COMPARISON: 8/1/2022.    TECHNIQUE: Upright frontal and lateral views of the chest.    FINDINGS: Stable left apical chest tube. Stable right basilar chest  tube. Post surgical changes of bilateral lung transplant with  clamshell sternotomy wires intact. Unchanged left upper extremity  vascular catheter.    Trachea is midline. Cardiac and mediastinal silhouette is stable.  Stable perihilar pulmonary opacities. Bilateral hydropneumothorax with  decreased air locule. Unchanged biapical opacity. Metallic density  projecting over the left upper quadrant on the frontal projection,  likely external to the patient      Impression    IMPRESSION:     1. Bilateral loculated hydropneumothorax with decreased air component  compared to prior radiograph.  2. Relatively unchanged perihilar pulmonary opacities.    I have personally reviewed the examination and initial interpretation  and I agree  with the findings.    NIKOS JAVED MD         SYSTEM ID:  [id number]   XR Chest 2 Views    Narrative    Chest 2 views    INDICATION: Interval follow-up lung transplant, bilateral chest tubes    COMPARISON: Yesterday    Findings: Clamshell sternotomy from prior bilateral lung transplant  again noted. Left pleural effusion with possible loculation component  unchanged. Atelectasis or mild edema in the right upper lung zones  with another atelectasis in the left upper lung unchanged. Left chest  catheter again present. Right basilar thoracostomy tube again present.  Loculated right hydropneumothorax unchanged.      Impression    IMPRESSION: No significant interval change with underlying bilateral  lung transplant.    ALVINA HARRY MD         SYSTEM ID:  [id number]

## 2022-08-03 NOTE — PLAN OF CARE
Goal Outcome Evaluation:    Plan of Care Reviewed With: other (see comments)     Overall Patient Progress: no change    Outcome Evaluation: TF off this morning for endoscopy

## 2022-08-03 NOTE — PLAN OF CARE
Problem: Bleeding (Lung Surgery)  Goal: Absence of Bleeding  Outcome: Ongoing, Progressing     Problem: Bowel Motility Impaired (Lung Surgery)  Goal: Effective Bowel Elimination  Outcome: Ongoing, Progressing     Problem: Fluid and Electrolyte Imbalance (Lung Surgery)  Goal: Fluid and Electrolyte Balance  Outcome: Ongoing, Progressing     Problem: Hemodynamic Instability (Lung Surgery)  Goal: Effective Tissue Perfusion  Outcome: Ongoing, Progressing     Problem: Infection (Lung Surgery)  Goal: Absence of Infection Signs and Symptoms  Outcome: Ongoing, Progressing  Intervention: Prevent or Manage Infection  Recent Flowsheet Documentation  Taken 8/3/2022 1600 by David Ross RN  Infection Prevention:   environmental surveillance performed   cohorting utilized   equipment surfaces disinfected   hand hygiene promoted   personal protective equipment utilized   rest/sleep promoted  Taken 8/3/2022 1200 by David Ross RN  Infection Prevention:   environmental surveillance performed   cohorting utilized   equipment surfaces disinfected   hand hygiene promoted   personal protective equipment utilized   rest/sleep promoted  Taken 8/3/2022 0800 by David Ross RN  Infection Prevention:   environmental surveillance performed   cohorting utilized   equipment surfaces disinfected   hand hygiene promoted   personal protective equipment utilized   rest/sleep promoted     Problem: Ongoing Anesthesia Effects (Lung Surgery)  Goal: Anesthesia/Sedation Recovery  Outcome: Ongoing, Progressing  Intervention: Optimize Anesthesia Recovery  Recent Flowsheet Documentation  Taken 8/3/2022 1600 by David Ross RN  Safety Promotion/Fall Prevention:   activity supervised   assistive device/personal items within reach   fall prevention program maintained   clutter free environment maintained   lighting adjusted   nonskid shoes/slippers when out of bed   safety round/check completed  Taken 8/3/2022 1200 by David Ross  RN  Safety Promotion/Fall Prevention:   activity supervised   assistive device/personal items within reach   fall prevention program maintained   clutter free environment maintained   lighting adjusted   nonskid shoes/slippers when out of bed   safety round/check completed  Taken 8/3/2022 0800 by David Ross RN  Safety Promotion/Fall Prevention:   activity supervised   assistive device/personal items within reach   fall prevention program maintained   clutter free environment maintained   lighting adjusted   nonskid shoes/slippers when out of bed   safety round/check completed     Problem: Pain (Lung Surgery)  Goal: Acceptable Pain Control  Outcome: Ongoing, Progressing     Problem: Postoperative Nausea and Vomiting (Lung Surgery)  Goal: Nausea and Vomiting Relief  Outcome: Ongoing, Progressing     Problem: Postoperative Urinary Retention (Lung Surgery)  Goal: Effective Urinary Elimination  Outcome: Ongoing, Progressing     Problem: Respiratory Compromise (Lung Surgery)  Goal: Effective Oxygenation and Ventilation  Outcome: Ongoing, Progressing  Intervention: Optimize Oxygenation and Ventilation  Recent Flowsheet Documentation  Taken 8/3/2022 1600 by David Ross RN  Head of Bed (HOB) Positioning: HOB at 20-30 degrees  Taken 8/3/2022 1200 by David Ross RN  Head of Bed (HOB) Positioning: HOB at 20-30 degrees  Taken 8/3/2022 0800 by David Ross RN  Head of Bed (HOB) Positioning: HOB at 20-30 degrees     Problem: Plan of Care - These are the overarching goals to be used throughout the patient stay.    Goal: Plan of Care Review/Shift Note  Description: The Plan of Care Review/Shift note should be completed every shift.  The Outcome Evaluation is a brief statement about your assessment that the patient is improving, declining, or no change.  This information will be displayed automatically on your shift note.  Outcome: Ongoing, Progressing  Flowsheets (Taken 8/3/2022 1654)  Plan of Care Reviewed With:  "patient  Overall Patient Progress: improving  Goal: Patient-Specific Goal (Individualized)  Description: You can add care plan individualizations to a care plan. Examples of Individualization might be:  \"Parent requests to be called daily at 9am for status\", \"I have a hard time hearing out of my right ear\", or \"Do not touch me to wake me up as it startles me\".  Outcome: Ongoing, Progressing  Goal: Absence of Hospital-Acquired Illness or Injury  Outcome: Ongoing, Progressing  Intervention: Identify and Manage Fall Risk  Recent Flowsheet Documentation  Taken 8/3/2022 1600 by David Ross RN  Safety Promotion/Fall Prevention:   activity supervised   assistive device/personal items within reach   fall prevention program maintained   clutter free environment maintained   lighting adjusted   nonskid shoes/slippers when out of bed   safety round/check completed  Taken 8/3/2022 1200 by David Ross RN  Safety Promotion/Fall Prevention:   activity supervised   assistive device/personal items within reach   fall prevention program maintained   clutter free environment maintained   lighting adjusted   nonskid shoes/slippers when out of bed   safety round/check completed  Taken 8/3/2022 0800 by David Ross RN  Safety Promotion/Fall Prevention:   activity supervised   assistive device/personal items within reach   fall prevention program maintained   clutter free environment maintained   lighting adjusted   nonskid shoes/slippers when out of bed   safety round/check completed  Intervention: Prevent Skin Injury  Recent Flowsheet Documentation  Taken 8/3/2022 1600 by David Ross RN  Body Position:   refuses positioning   position maintained  Taken 8/3/2022 1200 by David Ross RN  Body Position:   refuses positioning   position maintained  Taken 8/3/2022 0800 by David Ross RN  Body Position:   refuses positioning   position maintained  Intervention: Prevent and Manage VTE (Venous Thromboembolism) " Risk  Recent Flowsheet Documentation  Taken 8/3/2022 1600 by David Ross RN  Range of Motion:   active ROM (range of motion) encouraged   ROM (range of motion) performed  VTE Prevention/Management: SCDs (sequential compression devices) off  Activity Management: activity adjusted per tolerance  Taken 8/3/2022 1200 by David Ross RN  Range of Motion:   active ROM (range of motion) encouraged   ROM (range of motion) performed  VTE Prevention/Management: SCDs (sequential compression devices) off  Activity Management: activity adjusted per tolerance  Taken 8/3/2022 0800 by David Ross RN  Range of Motion:   active ROM (range of motion) encouraged   ROM (range of motion) performed  VTE Prevention/Management: SCDs (sequential compression devices) off  Activity Management: activity adjusted per tolerance  Intervention: Prevent Infection  Recent Flowsheet Documentation  Taken 8/3/2022 1600 by David Ross RN  Infection Prevention:   environmental surveillance performed   cohorting utilized   equipment surfaces disinfected   hand hygiene promoted   personal protective equipment utilized   rest/sleep promoted  Taken 8/3/2022 1200 by David Ross RN  Infection Prevention:   environmental surveillance performed   cohorting utilized   equipment surfaces disinfected   hand hygiene promoted   personal protective equipment utilized   rest/sleep promoted  Taken 8/3/2022 0800 by David Ross RN  Infection Prevention:   environmental surveillance performed   cohorting utilized   equipment surfaces disinfected   hand hygiene promoted   personal protective equipment utilized   rest/sleep promoted  Goal: Optimal Comfort and Wellbeing  Outcome: Ongoing, Progressing  Intervention: Provide Person-Centered Care  Recent Flowsheet Documentation  Taken 8/3/2022 1600 by David Ross RN  Trust Relationship/Rapport:   care explained   choices provided   questions answered   questions encouraged   reassurance  provided  Taken 8/3/2022 1200 by David Ross RN  Trust Relationship/Rapport:   care explained   choices provided   questions answered   questions encouraged   reassurance provided  Taken 8/3/2022 0800 by David Ross RN  Trust Relationship/Rapport:   care explained   choices provided   questions answered   questions encouraged   reassurance provided  Goal: Readiness for Transition of Care  Outcome: Ongoing, Progressing     Problem: Risk for Delirium  Goal: Optimal Coping  Outcome: Ongoing, Progressing  Goal: Improved Behavioral Control  Outcome: Ongoing, Progressing  Goal: Improved Attention and Thought Clarity  Outcome: Ongoing, Progressing  Goal: Improved Sleep  Outcome: Ongoing, Progressing     Problem: Oral Intake Inadequate  Goal: Improved Oral Intake  Outcome: Ongoing, Progressing     Problem: Pain Acute  Goal: Acceptable Pain Control and Functional Ability  Outcome: Ongoing, Progressing  Intervention: Prevent or Manage Pain  Recent Flowsheet Documentation  Taken 8/3/2022 1600 by David Ross RN  Medication Review/Management: medications reviewed  Taken 8/3/2022 1200 by David Ross RN  Medication Review/Management: medications reviewed  Taken 8/3/2022 0800 by David Ross RN  Medication Review/Management: medications reviewed     Problem: COPD (Chronic Obstructive Pulmonary Disease) Comorbidity  Goal: Maintenance of COPD Symptom Control  Outcome: Ongoing, Progressing  Intervention: Maintain COPD-Symptom Control  Recent Flowsheet Documentation  Taken 8/3/2022 1600 by David Ross RN  Medication Review/Management: medications reviewed  Taken 8/3/2022 1200 by David Ross RN  Medication Review/Management: medications reviewed  Taken 8/3/2022 0800 by David Ross RN  Medication Review/Management: medications reviewed     Problem: Heart Failure Comorbidity  Goal: Maintenance of Heart Failure Symptom Control  Outcome: Ongoing, Progressing  Intervention: Maintain Heart  Failure-Management  Recent Flowsheet Documentation  Taken 8/3/2022 1600 by David Ross RN  Medication Review/Management: medications reviewed  Taken 8/3/2022 1200 by David Ross RN  Medication Review/Management: medications reviewed  Taken 8/3/2022 0800 by David Ross RN  Medication Review/Management: medications reviewed     Problem: Hypertension Comorbidity  Goal: Blood Pressure in Desired Range  Outcome: Ongoing, Progressing  Intervention: Maintain Blood Pressure Management  Recent Flowsheet Documentation  Taken 8/3/2022 1600 by David Ross RN  Medication Review/Management: medications reviewed  Taken 8/3/2022 1200 by David Ross RN  Medication Review/Management: medications reviewed  Taken 8/3/2022 0800 by David Ross RN  Medication Review/Management: medications reviewed     Problem: Device-Related Complication Risk (Hemodialysis)  Goal: Safe, Effective Therapy Delivery  Outcome: Ongoing, Progressing  Intervention: Optimize Device Care and Function  Recent Flowsheet Documentation  Taken 8/3/2022 1600 by David Ross RN  Medication Review/Management: medications reviewed  Taken 8/3/2022 1200 by David Ross RN  Medication Review/Management: medications reviewed  Taken 8/3/2022 0800 by David Ross RN  Medication Review/Management: medications reviewed     Problem: Hemodynamic Instability (Hemodialysis)  Goal: Effective Tissue Perfusion  Outcome: Ongoing, Progressing     Problem: Infection (Hemodialysis)  Goal: Absence of Infection Signs and Symptoms  Outcome: Ongoing, Progressing   Goal Outcome Evaluation:    Plan of Care Reviewed With: patient     Overall Patient Progress: improving

## 2022-08-03 NOTE — PLAN OF CARE
"Neuro: A&Ox4. Patient is A&Ox4 . NSR to ST with occasional PVC's.    Cardiac:  Blood pressure 105/57, pulse 97, temperature 98.9  F (37.2  C), temperature source Oral, resp. rate 12, height 1.575 m (5' 2\"), weight 70 kg (154 lb 5.2 oz), SpO2 100 %, not currently breastfeeding.  Respiratory: Sating 99 on 2L NC.  GI/: Adequate urine output. BM X 1  Diet/appetite: Tube Feeds off at 0000 for NPO orders   Activity:  Assist X 1.  Pain: Patient pain is managed with PRN medication, At acceptable level on current regimen.   Skin: No new deficits noted.  LDA's: PIV, Midline SL    Plan: Continue with POC. Notify primary team with changes.                   "

## 2022-08-03 NOTE — PROGRESS NOTES
Neuro: A&Ox4, denies numbness/tingling, afebrile  CV: SR to low Sinus Tachycardia, normotensive, did give a unit of blood.  Resp: 2LPM n/c today. Did attempt to titrate off oxygen and patient sats mid 80s. Clear LS. Uses breathing exercises independently.  GI/: TF @ 35/hr. No BM today. Adequate UOP. Reported some nausea, resolved with scheduled Zofran. Cramping this evening, simethicone declined per patient.   ID/Heme: Chest tube x2. Per orders, left chest tube to water seal today. Right chest tube continues to be to -20 suction.   Access: RUE PICC, R PIV    POC: EDG today and X-Ray of the chest tube sites.

## 2022-08-04 ENCOUNTER — APPOINTMENT (OUTPATIENT)
Dept: PHYSICAL THERAPY | Facility: CLINIC | Age: 62
DRG: 007 | End: 2022-08-04
Attending: THORACIC SURGERY (CARDIOTHORACIC VASCULAR SURGERY)
Payer: MEDICARE

## 2022-08-04 ENCOUNTER — HOME INFUSION (PRE-WILLOW HOME INFUSION) (OUTPATIENT)
Dept: PHARMACY | Facility: CLINIC | Age: 62
End: 2022-08-04

## 2022-08-04 ENCOUNTER — APPOINTMENT (OUTPATIENT)
Dept: GENERAL RADIOLOGY | Facility: CLINIC | Age: 62
DRG: 007 | End: 2022-08-04
Attending: PHYSICIAN ASSISTANT
Payer: MEDICARE

## 2022-08-04 LAB
ALBUMIN SERPL BCG-MCNC: 2.8 G/DL (ref 3.5–5.2)
ALP SERPL-CCNC: 60 U/L (ref 35–104)
ALT SERPL W P-5'-P-CCNC: 12 U/L (ref 10–35)
AMMONIA PLAS-SCNC: 11 UMOL/L (ref 11–51)
ANION GAP SERPL CALCULATED.3IONS-SCNC: 3 MMOL/L (ref 7–15)
AST SERPL W P-5'-P-CCNC: 22 U/L (ref 10–35)
BASE EXCESS BLDV CALC-SCNC: 18.7 MMOL/L (ref -7.7–1.9)
BASOPHILS # BLD AUTO: 0 10E3/UL (ref 0–0.2)
BASOPHILS NFR BLD AUTO: 0 %
BILIRUB DIRECT SERPL-MCNC: <0.2 MG/DL (ref 0–0.3)
BILIRUB SERPL-MCNC: 0.2 MG/DL
BUN SERPL-MCNC: 60.3 MG/DL (ref 8–23)
CALCIUM SERPL-MCNC: 9.4 MG/DL (ref 8.8–10.2)
CHLORIDE SERPL-SCNC: 96 MMOL/L (ref 98–107)
CREAT SERPL-MCNC: 1.2 MG/DL (ref 0.51–0.95)
DEPRECATED HCO3 PLAS-SCNC: 41 MMOL/L (ref 22–29)
EOSINOPHIL # BLD AUTO: 0.1 10E3/UL (ref 0–0.7)
EOSINOPHIL NFR BLD AUTO: 2 %
ERYTHROCYTE [DISTWIDTH] IN BLOOD BY AUTOMATED COUNT: 19.9 % (ref 10–15)
GFR SERPL CREATININE-BSD FRML MDRD: 52 ML/MIN/1.73M2
GLUCOSE BLDC GLUCOMTR-MCNC: 113 MG/DL (ref 70–99)
GLUCOSE BLDC GLUCOMTR-MCNC: 126 MG/DL (ref 70–99)
GLUCOSE BLDC GLUCOMTR-MCNC: 127 MG/DL (ref 70–99)
GLUCOSE BLDC GLUCOMTR-MCNC: 133 MG/DL (ref 70–99)
GLUCOSE BLDC GLUCOMTR-MCNC: 148 MG/DL (ref 70–99)
GLUCOSE BLDC GLUCOMTR-MCNC: 160 MG/DL (ref 70–99)
GLUCOSE BLDC GLUCOMTR-MCNC: 165 MG/DL (ref 70–99)
GLUCOSE BLDC GLUCOMTR-MCNC: 170 MG/DL (ref 70–99)
GLUCOSE SERPL-MCNC: 122 MG/DL (ref 70–99)
HCO3 BLDV-SCNC: 46 MMOL/L (ref 21–28)
HCT VFR BLD AUTO: 27.5 % (ref 35–47)
HGB BLD-MCNC: 8.4 G/DL (ref 11.7–15.7)
IMM GRANULOCYTES # BLD: 0.2 10E3/UL
IMM GRANULOCYTES NFR BLD: 3 %
LYMPHOCYTES # BLD AUTO: 0.2 10E3/UL (ref 0.8–5.3)
LYMPHOCYTES NFR BLD AUTO: 3 %
MAGNESIUM SERPL-MCNC: 2.4 MG/DL (ref 1.7–2.3)
MCH RBC QN AUTO: 30.3 PG (ref 26.5–33)
MCHC RBC AUTO-ENTMCNC: 30.5 G/DL (ref 31.5–36.5)
MCV RBC AUTO: 99 FL (ref 78–100)
MONOCYTES # BLD AUTO: 0.4 10E3/UL (ref 0–1.3)
MONOCYTES NFR BLD AUTO: 5 %
NEUTROPHILS # BLD AUTO: 5.9 10E3/UL (ref 1.6–8.3)
NEUTROPHILS NFR BLD AUTO: 87 %
NRBC # BLD AUTO: 0 10E3/UL
NRBC BLD AUTO-RTO: 0 /100
O2/TOTAL GAS SETTING VFR VENT: 2 %
PCO2 BLDV: 71 MM HG (ref 40–50)
PH BLDV: 7.42 [PH] (ref 7.32–7.43)
PHOSPHATE SERPL-MCNC: 3.1 MG/DL (ref 2.5–4.5)
PLATELET # BLD AUTO: 215 10E3/UL (ref 150–450)
PO2 BLDV: 31 MM HG (ref 25–47)
POTASSIUM SERPL-SCNC: 4.9 MMOL/L (ref 3.4–5.3)
PROT SERPL-MCNC: 5.4 G/DL (ref 6.4–8.3)
RBC # BLD AUTO: 2.77 10E6/UL (ref 3.8–5.2)
SODIUM SERPL-SCNC: 140 MMOL/L (ref 136–145)
WBC # BLD AUTO: 6.9 10E3/UL (ref 4–11)

## 2022-08-04 PROCEDURE — 97530 THERAPEUTIC ACTIVITIES: CPT | Mod: GP

## 2022-08-04 PROCEDURE — 250N000013 HC RX MED GY IP 250 OP 250 PS 637: Performed by: SURGERY

## 2022-08-04 PROCEDURE — 85025 COMPLETE CBC W/AUTO DIFF WBC: CPT | Performed by: STUDENT IN AN ORGANIZED HEALTH CARE EDUCATION/TRAINING PROGRAM

## 2022-08-04 PROCEDURE — 36592 COLLECT BLOOD FROM PICC: CPT | Performed by: STUDENT IN AN ORGANIZED HEALTH CARE EDUCATION/TRAINING PROGRAM

## 2022-08-04 PROCEDURE — 71045 X-RAY EXAM CHEST 1 VIEW: CPT

## 2022-08-04 PROCEDURE — 250N000011 HC RX IP 250 OP 636: Performed by: INTERNAL MEDICINE

## 2022-08-04 PROCEDURE — 250N000013 HC RX MED GY IP 250 OP 250 PS 637: Performed by: HOSPITALIST

## 2022-08-04 PROCEDURE — 71046 X-RAY EXAM CHEST 2 VIEWS: CPT

## 2022-08-04 PROCEDURE — 999N000157 HC STATISTIC RCP TIME EA 10 MIN

## 2022-08-04 PROCEDURE — 94668 MNPJ CHEST WALL SBSQ: CPT

## 2022-08-04 PROCEDURE — 84100 ASSAY OF PHOSPHORUS: CPT | Performed by: STUDENT IN AN ORGANIZED HEALTH CARE EDUCATION/TRAINING PROGRAM

## 2022-08-04 PROCEDURE — 83735 ASSAY OF MAGNESIUM: CPT | Performed by: STUDENT IN AN ORGANIZED HEALTH CARE EDUCATION/TRAINING PROGRAM

## 2022-08-04 PROCEDURE — 82140 ASSAY OF AMMONIA: CPT | Performed by: NURSE PRACTITIONER

## 2022-08-04 PROCEDURE — 94667 MNPJ CHEST WALL 1ST: CPT

## 2022-08-04 PROCEDURE — 99233 SBSQ HOSP IP/OBS HIGH 50: CPT | Performed by: INTERNAL MEDICINE

## 2022-08-04 PROCEDURE — C9113 INJ PANTOPRAZOLE SODIUM, VIA: HCPCS | Performed by: INTERNAL MEDICINE

## 2022-08-04 PROCEDURE — 250N000012 HC RX MED GY IP 250 OP 636 PS 637: Performed by: INTERNAL MEDICINE

## 2022-08-04 PROCEDURE — 250N000011 HC RX IP 250 OP 636

## 2022-08-04 PROCEDURE — 71046 X-RAY EXAM CHEST 2 VIEWS: CPT | Mod: 26 | Performed by: RADIOLOGY

## 2022-08-04 PROCEDURE — 999N000044 HC STATISTIC CVC DRESSING CHANGE

## 2022-08-04 PROCEDURE — 99232 SBSQ HOSP IP/OBS MODERATE 35: CPT | Mod: 24 | Performed by: INTERNAL MEDICINE

## 2022-08-04 PROCEDURE — 94640 AIRWAY INHALATION TREATMENT: CPT | Mod: 76

## 2022-08-04 PROCEDURE — 250N000009 HC RX 250: Performed by: PHYSICIAN ASSISTANT

## 2022-08-04 PROCEDURE — 120N000002 HC R&B MED SURG/OB UMMC

## 2022-08-04 PROCEDURE — 250N000013 HC RX MED GY IP 250 OP 250 PS 637: Performed by: STUDENT IN AN ORGANIZED HEALTH CARE EDUCATION/TRAINING PROGRAM

## 2022-08-04 PROCEDURE — 250N000011 HC RX IP 250 OP 636: Performed by: HOSPITALIST

## 2022-08-04 PROCEDURE — 250N000013 HC RX MED GY IP 250 OP 250 PS 637: Performed by: NURSE PRACTITIONER

## 2022-08-04 PROCEDURE — 250N000013 HC RX MED GY IP 250 OP 250 PS 637: Performed by: INTERNAL MEDICINE

## 2022-08-04 PROCEDURE — 80053 COMPREHEN METABOLIC PANEL: CPT | Performed by: STUDENT IN AN ORGANIZED HEALTH CARE EDUCATION/TRAINING PROGRAM

## 2022-08-04 PROCEDURE — 71045 X-RAY EXAM CHEST 1 VIEW: CPT | Mod: 26 | Performed by: RADIOLOGY

## 2022-08-04 PROCEDURE — 82248 BILIRUBIN DIRECT: CPT | Performed by: NURSE PRACTITIONER

## 2022-08-04 PROCEDURE — 97116 GAIT TRAINING THERAPY: CPT | Mod: GP

## 2022-08-04 PROCEDURE — 82803 BLOOD GASES ANY COMBINATION: CPT | Performed by: STUDENT IN AN ORGANIZED HEALTH CARE EDUCATION/TRAINING PROGRAM

## 2022-08-04 PROCEDURE — 94640 AIRWAY INHALATION TREATMENT: CPT

## 2022-08-04 PROCEDURE — 250N000013 HC RX MED GY IP 250 OP 250 PS 637: Performed by: THORACIC SURGERY (CARDIOTHORACIC VASCULAR SURGERY)

## 2022-08-04 RX ADMIN — MYCOPHENOLATE MOFETIL 500 MG: 200 POWDER, FOR SUSPENSION ORAL at 20:03

## 2022-08-04 RX ADMIN — ACETAMINOPHEN 975 MG: 325 TABLET, FILM COATED ORAL at 08:52

## 2022-08-04 RX ADMIN — MYCOPHENOLATE MOFETIL 500 MG: 200 POWDER, FOR SUSPENSION ORAL at 08:44

## 2022-08-04 RX ADMIN — HEPARIN SODIUM 850 UNITS/HR: 10000 INJECTION, SOLUTION INTRAVENOUS at 21:44

## 2022-08-04 RX ADMIN — CALCIUM CARBONATE 600 MG (1,500 MG)-VITAMIN D3 400 UNIT TABLET 1 TABLET: at 18:07

## 2022-08-04 RX ADMIN — ACETYLCYSTEINE 2 ML: 200 INHALANT RESPIRATORY (INHALATION) at 20:56

## 2022-08-04 RX ADMIN — ONDANSETRON 4 MG: 4 TABLET, ORALLY DISINTEGRATING ORAL at 08:52

## 2022-08-04 RX ADMIN — PANTOPRAZOLE SODIUM 40 MG: 40 INJECTION, POWDER, FOR SOLUTION INTRAVENOUS at 08:51

## 2022-08-04 RX ADMIN — ACETAMINOPHEN 975 MG: 325 TABLET, FILM COATED ORAL at 15:23

## 2022-08-04 RX ADMIN — PREDNISONE 10 MG: 10 TABLET ORAL at 20:03

## 2022-08-04 RX ADMIN — NYSTATIN 1000000 UNITS: 100000 SUSPENSION ORAL at 08:51

## 2022-08-04 RX ADMIN — ACETYLCYSTEINE 2 ML: 200 INHALANT RESPIRATORY (INHALATION) at 08:00

## 2022-08-04 RX ADMIN — INSULIN ASPART 2 UNITS: 100 INJECTION, SOLUTION INTRAVENOUS; SUBCUTANEOUS at 15:27

## 2022-08-04 RX ADMIN — OXYCODONE HYDROCHLORIDE 10 MG: 5 TABLET ORAL at 11:52

## 2022-08-04 RX ADMIN — NYSTATIN: 100000 CREAM TOPICAL at 20:04

## 2022-08-04 RX ADMIN — VALGANCICLOVIR HYDROCHLORIDE 450 MG: 50 POWDER, FOR SOLUTION ORAL at 08:51

## 2022-08-04 RX ADMIN — INSULIN ASPART 2 UNITS: 100 INJECTION, SOLUTION INTRAVENOUS; SUBCUTANEOUS at 04:36

## 2022-08-04 RX ADMIN — METOPROLOL TARTRATE 25 MG: 25 TABLET, FILM COATED ORAL at 08:52

## 2022-08-04 RX ADMIN — ACETYLCYSTEINE 2 ML: 200 INHALANT RESPIRATORY (INHALATION) at 14:10

## 2022-08-04 RX ADMIN — LEVALBUTEROL HYDROCHLORIDE 1.25 MG: 1.25 SOLUTION RESPIRATORY (INHALATION) at 08:00

## 2022-08-04 RX ADMIN — LEVALBUTEROL HYDROCHLORIDE 1.25 MG: 1.25 SOLUTION RESPIRATORY (INHALATION) at 14:10

## 2022-08-04 RX ADMIN — THERA TABS 1 TABLET: TAB at 11:52

## 2022-08-04 RX ADMIN — LEVALBUTEROL HYDROCHLORIDE 1.25 MG: 1.25 SOLUTION RESPIRATORY (INHALATION) at 20:56

## 2022-08-04 RX ADMIN — Medication 40 MG: at 08:19

## 2022-08-04 RX ADMIN — TACROLIMUS 3 MG: 5 CAPSULE ORAL at 18:07

## 2022-08-04 RX ADMIN — OXYCODONE HYDROCHLORIDE 10 MG: 5 TABLET ORAL at 18:15

## 2022-08-04 RX ADMIN — TACROLIMUS 3 MG: 5 CAPSULE ORAL at 08:54

## 2022-08-04 RX ADMIN — NYSTATIN 1000000 UNITS: 100000 SUSPENSION ORAL at 18:07

## 2022-08-04 RX ADMIN — CALCIUM CARBONATE 600 MG (1,500 MG)-VITAMIN D3 400 UNIT TABLET 1 TABLET: at 09:40

## 2022-08-04 RX ADMIN — NYSTATIN 1000000 UNITS: 100000 SUSPENSION ORAL at 21:45

## 2022-08-04 RX ADMIN — NYSTATIN: 100000 CREAM TOPICAL at 08:56

## 2022-08-04 RX ADMIN — PREDNISONE 12.5 MG: 2.5 TABLET ORAL at 08:54

## 2022-08-04 RX ADMIN — METOPROLOL TARTRATE 25 MG: 25 TABLET, FILM COATED ORAL at 20:03

## 2022-08-04 RX ADMIN — PANTOPRAZOLE SODIUM 40 MG: 40 INJECTION, POWDER, FOR SOLUTION INTRAVENOUS at 20:03

## 2022-08-04 RX ADMIN — THIAMINE HCL TAB 100 MG 100 MG: 100 TAB at 09:40

## 2022-08-04 RX ADMIN — OXYCODONE HYDROCHLORIDE 10 MG: 5 TABLET ORAL at 21:44

## 2022-08-04 RX ADMIN — Medication 1 PACKET: at 12:04

## 2022-08-04 RX ADMIN — OXYCODONE HYDROCHLORIDE 10 MG: 5 TABLET ORAL at 00:26

## 2022-08-04 RX ADMIN — ACETAMINOPHEN 975 MG: 325 TABLET, FILM COATED ORAL at 20:03

## 2022-08-04 RX ADMIN — INSULIN ASPART 1 UNITS: 100 INJECTION, SOLUTION INTRAVENOUS; SUBCUTANEOUS at 00:26

## 2022-08-04 RX ADMIN — NYSTATIN 1000000 UNITS: 100000 SUSPENSION ORAL at 15:22

## 2022-08-04 ASSESSMENT — ACTIVITIES OF DAILY LIVING (ADL)
ADLS_ACUITY_SCORE: 31
ADLS_ACUITY_SCORE: 33
ADLS_ACUITY_SCORE: 31
ADLS_ACUITY_SCORE: 33
ADLS_ACUITY_SCORE: 33
ADLS_ACUITY_SCORE: 31
ADLS_ACUITY_SCORE: 33
ADLS_ACUITY_SCORE: 33
ADLS_ACUITY_SCORE: 31
ADLS_ACUITY_SCORE: 33

## 2022-08-04 NOTE — PROGRESS NOTES
Pulmonary Medicine  Cystic Fibrosis - Lung Transplant Team  Progress Note  2022     Patient: Sofie Rodriguez  MRN: [id number]  : 1960 (age 62 year old)  Transplant: 2022 (Lung), POD#37  Admission date: 2022    Assessment & Plan:     Sfoie Rodriguez is a 62 year old female with a PMH significant for end-stage COPD, HTN, HFpEF, Mycobacterium peregrinum colonization, h/o hepatitis C, HECTOR s/p LEEP procedure, osteopenia, and former methamphetamine use.  Pt. is now s/p BSLT on 22, lungs slightly undersized.   Persistent low dose pressor needs post-op through 7/10.  Extubated POD #2 but with persistent hypercapnia, mostly compensated and only slightly improved with intermittent BiPAP.  Also with left radial artery thrombus (presumed secondary to arterial line) s/p thrombectomy , BACILIO, and C diff.  Rehabilitation and airway clearance initially limited by dysrhythmia and gastric discomfort, now with gradual improvement.  S/p GJ tube placement in IR .  EGD 8/3 for coffee ground g tube return.  Found to have pyloric ulcer.      Today's recommendations:  - Wean supplemental O2 as able  - Plan to remove left chest tube today (right chest tube managed by CVTS, output remains moderate)  - Volume status per primary team   - Inspection bronch  to evaluate anastomoses with normal airway and no secretions  - Repeat tacro level  ordered  - Prednisone taper due  not ordered  - G tube to gravity drainage    --retained fluid and food on EGD today  - discontinue daily VBG     S/p bilateral sequential lung transplant (BSLT) for end stage COPD:  Persistent hypercapneic respiratory failure:   Persistent hypotension, Resolved:   Bilateral hydroPTX:  Right hemidiaphragm palsy: Explant pathology with severe emphysema with subpleural bullae formation, changes of chronic bronchitis, subpleural scars and patchy pulmonary edema; benign hilar lymph nodes.  No evidence of PGD post-op.  Extubated .   Persistent hypercapnia without dyspnea, appears to be a respiratory drive problem.  Persistent low dose pressors weaned off 7/10 and scheduled midodrine topped 7/19.  SNIFF (7/14) notable for right hemidiaphragm palsy.  Bronch (7/19) with slight graying of mucosa noted at right anastomosis and scant secretions (slightly increased in RLL).  Chest CT (7/23) with increased loculated fluid within the left hemithorax with specks of air density in the loculated fluid, similar appearing loculated right hydroPTX with minimal decrease in fluid component (right chest tube appears to be outside the loculated hydroPTX), increased size of pleural based lesion in MARLON, and mild subcutaneous emphysema along right chest tube with minimal along tract of removed left chest tube.  S/p left apical chest tube placed by IR 7/25, exudative (right surgical tube also remains, output moderate).  NIPPV overnight for persistent hypercapnia, stopped given lack of improvement with therapy, compensated hypercapnia persists.  On 0.5-2L NC.   - agree with discontinuing ammonia monitoring  - Nebs: levalbuterol and Mucomyst TID  - Pulmonary toilet with chest physiotherapy TID (decreased 7/28)  - Aerobika and incentive spirometry hourly while awake  - Supplemental oxygen as needed to maintain SpO2 >92% (wean as able)  - would discontinued daily VBG   - Right chest tube (managed by surgical team) and left apical chest tube (placed by IR), plan to clamp left chest tube today  - CXR reviewed by me  - Volume management per primary team  - Repeat inspection bronch 8/2 to evaluate anastomoses--normal     Immunosuppression:  Induction therapy with basiliximab (and high dose IV steroid).  - Tacrolimus Goal level 8-12.  Repeat level 8/6  -  mg BID (decreased 7/27, GI symptoms/leukopenia)  - Prednisone 12.5 mg BID, next taper due 8/18 (not ordered)  Date AM dose (mg) PM dose (mg)   8/4/22 12.5 10   8/18/22 10 10   9/15/22 10 7.5   10/13/22 7.5 7.5    11/10/22 7.5 5   12/8/22 5 5   1/5/23 5 2.5      Prophylaxis:   - Dapsone qMWF for PJP ppx (7/18), defer increasing to daily pending hgb stability (once consistently >8)  - VGCV for CMV ppx, CMV negative 7/25  - Nystatin for oral candidiasis ppx, 6 month course  - See below for serologies and viral ppx:    Donor Recipient Intervention   CMV status Positive Positive Valganciclovir POD #8-90   EBV status Positive Positive EBV monitoring as below   HSV status N/A Positive Not indicated      ID:  H/o M. peregrinum colonization: Donor bronch cultures (OSH) with Strep beta hemolytic (not group A).  Recipient cultures as below.  Bilateral pleural cultures (right 7/20, left 7/25) NGTD.  - AFB to be sent on all future bronchs (NGTD post-transplant)     Streptococcus pneumoniae:  Stenotrophomonas maltophilia: Noted in recipient cultures at time of transplant.  S/p ceftazidime 6/28-7/10, vancomycin 7/7-7/8 and 6/28-6/30, and levofloxacin 7/10-7/12 for total 2 week ABX course.     Hypogammaglobulinemia: IgG adequate at time of transplant, repeat level low (336) on 7/28.  S/p IVIG dosing with premedication on 7/30, tolerated well.  - Repeat IgG in one month (8/28)     H/o hepatitis C:  Diagnosed in 1980s s/p 2m treatment, quant negative since 10/2017, last positive 2/20/17 ([phone]).  Ab positive on 6/2021 with negative HCV PCR.  H/o remote EtOH abuse.  MR elastography (4/27/21) with hepatology review and consult without any concerns post transplant.  Hepatitis C RNA negative and hepatitis C antibody positive (old) on 6/28.     EBV viremia: EBV level 11k, log 4.1 on 7/28.  Not likely to be clinically significant.  - Monitor monthly (due 8/28)     Other relevant problems managed by primary team:      SVT:   Aflutter with RVR: SVT first noted on 7/14, prior to HD line placement.  Continues intermittently.  Aflutter with RVR to 200 7/17 triggered by activity.  EP consulted 7/17 given persistent tachycardia/dysrhythmia.  Midodrine  held 7/19.  - Metoprolol per primary team (started 7/15)  - Heparin drip (7/17), defer transition to DOAC at this time given chest tubes     Left hand ischemia: Radial artery thrombosis identified on duplex doppler.  S/p thrombectomy on 7/2.   - AC per primary      BACILIO:   Hyperkalemia: Likely multifactorial including medications (Bactrim, tacrolimus) and hypotension.  Fludrocortisone 7/6-7/11.  Potassium now stable.  S/p non-tunneled HD line 7/14.  Initial iHD run 7/14 limited by afib with RVR vs SVT.  Last iHD run 7/16, line removed 7/22.  Creatinine increased since 7/29 with diamox and elevated tacro level.   - Tacrolimus monitoring as above  - Management per nephrology and primary team     Abdominal pain: Noted 7/15, cramping pain.  ACR with large stool burden in colon, no obstruction or distention.  Some nausea but no emesis.  CT abdomen (7/15) with moderate to large gastric distention, otherwise without obstruction.  NG placed for LIS with moderate to large output for several days.  Increased abdominal pain 7/17 after clamping for 4 hours, tolerated clamping today without issue.  Gastric emptying study (7/20) with severe gastric emptying delay (95% retention at 4 hours).  S/p GJ tube placement in IR 7/27.  - Simethicone prn  - TF management per RD and primary team  - TF via J tube, G tube to gravity drainage      C diff infection: Abdominal pain with diarrhea noted 7/8, AXR without dilated bowel, moderate colonic stool burden.  C diff positive 7/11.  Loose stools (on tube feeds) stable.  S/p PO vancomycin (7/11-7/28).  - Low threshold to resume in the future with ABX course     Hypomagnesemia: Suppressed absorption d/t CNI.   - Continue daily magnesium with replacement protocol prn, schedule oral magnesium supplementation if needed pending improvement in stools     Anemia: Suspect from gi source and frequent blood draws     We appreciate the excellent care provided by the [place] [id number] team.  Recommendations  "communicated via in person rounding and this note.  Will continue to follow along closely, please do not hesitate to call with any questions or concerns.    Monik Paredes MD MPH  Associate Professor of Medicine  Pager [phone]     Subjective & Interval History:     Patient feeling good this am.  Anticipates a busy day.  Denies cough or shortness of breath.  Minimal pain at chest tube sites.    Review of Systems:     C: No fever, no chills,  NPO  INTEGUMENTARY/SKIN: No rash or obvious new lesions  ENT/MOUTH: No sore throat, no sinus pain, no nasal congestion or drainage  RESP: See interval history  CV: + chest pain, + peripheral edema  GI: No nausea, no vomiting, no change in stools, no reflux symptoms  : No dysuria  ENDOCRINE: Blood sugars with adequate control  PSYCHIATRIC: Mood stable    Physical Exam:     All notes, images, and labs from past 24 hours (at minimum) were reviewed.    Vital signs:  Temp: 99.9  F (37.7  C) Temp src: Oral BP: 122/71 Pulse: 91   Resp: 23 SpO2: 99 % O2 Device: Nasal cannula Oxygen Delivery: 1 LPM Height: 157.5 cm (5' 2\") Weight: 70 kg (154 lb 5.2 oz)  I/O:     Intake/Output Summary (Last 24 hours) at 8/4/2022 1619  Last data filed at 8/4/2022 1200  Gross per 24 hour   Intake 1385 ml   Output 680 ml   Net 705 ml     Constitutional: Sitting in bed, in no apparent distress.   HEENT: Eyes with pink conjunctivae, anicteric.  Oral mucosa moist without lesions.  Neck supple without lymphadenopathy.   PULM: Fair air flow bilaterally.  No crackles, no rhonchi, no wheezes.  CV: Normal S1 and S2.  RRR.  No murmur, gallop, or rub.  no peripheral edema.   ABD: NABS, soft, nontender, nondistended.    MSK: Moves all extremities.  + apparent muscle wasting.   NEURO: Alert and oriented, conversant.   SKIN: Warm, dry.  No rash on limited exam.   PSYCH: Mood stable.     Lines, Drains, and Devices:  Peripheral IV 07/20/22 Anterior;Right Lower forearm (Active)   Site Assessment WDL 08/03/22 1600 "   Line Status Saline locked 08/03/22 1600   Dressing Intervention New dressing  07/20/22 1548   Phlebitis Scale 0-->no symptoms 08/03/22 1600   Infiltration Scale 0 08/03/22 1600   Number of days: 14       Midline Catheter Double Lumen (Active)   Site Assessment WDL 08/03/22 1600   External Cath Length (cm) 2 cm 07/28/22 1455   Initial Extremity Circumference (cm) 29 cm 07/28/22 1455   Dressing Intervention Chlorhexidine patch;Transparent 08/03/22 0000   Line Necessity Yes, meets criteria 08/03/22 1600   Dressing Change Due 08/04/22 08/02/22 0400   Purple - Status saline locked 08/03/22 1600   Purple - Cap Change Due 08/03/22 08/02/22 0400   Red - Status blood return noted;saline locked 08/03/22 1600   Red - Cap Change Due 08/03/22 08/02/22 0400   Extravasation? No 08/03/22 1600   Number of days: 6     Data:     LABS    CMP:   Recent Labs   Lab 08/04/22  1526 08/04/22  1221 08/04/22  1202 08/04/22  0902 08/03/22  0756 08/03/22  0633 08/02/22  0902 08/02/22  0757 08/01/22  1218 08/01/22  0832   NA  --   --   --  140  --  140  --  141  --  140   POTASSIUM  --   --   --  4.9  --  5.1  --  4.6  --  4.2   CHLORIDE  --   --   --  96*  --  94*  --  94*  --  91*   CO2  --   --   --  41*  --  45*  --  47*  --  47*   ANIONGAP  --   --   --  3*  --  1*  --  <1*  --  2*   * 133* 113* 122*   < > 113*   < > 96   < > 144*   BUN  --   --   --  60.3*  --  73.5*  --  76.0*  --  80.2*   CR  --   --   --  1.20*  --  1.39*  --  1.40*  --  1.67*   GFRESTIMATED  --   --   --  52*  --  43*  --  43*  --  35*   ESTUARDO  --   --   --  9.4  --  9.2  --  9.0  --  9.0   MAG  --   --   --  2.4*  --  2.4*  --  2.6*  --  2.7*   PHOS  --   --   --  3.1  --  3.7  --  4.1  --  4.7*   PROTTOTAL  --   --   --  5.4*  --   --   --   --   --  5.3*   ALBUMIN  --   --   --  2.8*  --   --   --   --   --  2.8*   BILITOTAL  --   --   --  0.2  --   --   --   --   --  0.2   ALKPHOS  --   --   --  60  --   --   --   --   --  68   AST  --   --   --  22  --   --    --   --   --  22   ALT  --   --   --  12  --   --   --   --   --  12    < > = values in this interval not displayed.     CBC:   Recent Labs   Lab 08/04/22  0902 08/03/22  0633 08/02/22  1311 08/02/22  0757 07/31/22  0622   WBC 6.9 5.4  --  5.5 6.3   RBC 2.77* 2.11*  --  2.45* 2.61*   HGB 8.4* 6.5* 7.4* 7.5* 8.0*   HCT 27.5* 22.2*  --  25.4* 26.7*   MCV 99 105*  --  104* 102*   MCH 30.3 30.8  --  30.6 30.7   MCHC 30.5* 29.3*  --  29.5* 30.0*   RDW 19.9* 18.6*  --  18.5* 19.1*    202  --  216 252       INR:   Recent Labs   Lab 08/03/22  0633   INR 0.87       Glucose:   Recent Labs   Lab 08/04/22  1526 08/04/22  1221 08/04/22  1202 08/04/22  0902 08/04/22  0841 08/04/22  0435   * 133* 113* 122* 126* 165*       Blood Gas:   Recent Labs   Lab 08/04/22  0902 08/03/22  0633 08/02/22  0757   PHV 7.42 7.39 7.36   PCO2V 71* 78* 81*   PO2V 31 29 38   HCO3V 46* 47* 46*   LINDY 18.7* 20.4* 17.4*   O2PER 2 35 35       Culture Data No results for input(s): CULT in the last 168 hours.    Virology Data:   Lab Results   Component Value Date    FLUAH1 Not Detected 06/29/2022    FLUAH3 Not Detected 06/29/2022    AC5897 Not Detected 06/29/2022    IFLUB Not Detected 06/29/2022    RSVA Not Detected 06/29/2022    RSVB Not Detected 06/29/2022    PIV1 Not Detected 06/29/2022    PIV2 Not Detected 06/29/2022    PIV3 Not Detected 06/29/2022    HMPV Not Detected 06/29/2022       Historical CMV results (last 3 of prior testing):  Lab Results   Component Value Date    CMVQNT Not Detected 07/25/2022     No results found for: CMVLOG    Urine Studies    Recent Labs   Lab Test 08/01/22  0319 07/08/22  0831 07/05/22  1004   URINEPH 8.0* 5.5 5.5   NITRITE Negative Negative Negative   LEUKEST Negative Negative Negative   WBCU  --  1 5       Most Recent [place] Pulmonary Function Testing (FVC/FEV1 only)  FVC-Pre   Date Value Ref Range Status   04/29/2022 1.82 L    11/11/2021 2.17 L    06/14/2021 2.00 L      FVC-%Pred-Pre   Date Value Ref Range  Status   04/29/2022 58 %    11/11/2021 70 %    06/14/2021 64 %      FEV1-Pre   Date Value Ref Range Status   04/29/2022 0.51 L    11/11/2021 0.53 L    06/14/2021 0.54 L      FEV1-%Pred-Pre   Date Value Ref Range Status   04/29/2022 20 %    11/11/2021 21 %    06/14/2021 21 %        IMAGING    Recent Results (from the past 48 hour(s))   XR Chest 2 Views    Narrative    EXAM: XR CHEST 2 VIEWS 8/2/2022 1:55 PM    HISTORY: interval follow up, lung transplant, bilateral chest tubes.    COMPARISON: 8/1/2022.    TECHNIQUE: Upright frontal and lateral views of the chest.    FINDINGS: Stable left apical chest tube. Stable right basilar chest  tube. Post surgical changes of bilateral lung transplant with  clamshell sternotomy wires intact. Unchanged left upper extremity  vascular catheter.    Trachea is midline. Cardiac and mediastinal silhouette is stable.  Stable perihilar pulmonary opacities. Bilateral hydropneumothorax with  decreased air locule. Unchanged biapical opacity. Metallic density  projecting over the left upper quadrant on the frontal projection,  likely external to the patient      Impression    IMPRESSION:     1. Bilateral loculated hydropneumothorax with decreased air component  compared to prior radiograph.  2. Relatively unchanged perihilar pulmonary opacities.    I have personally reviewed the examination and initial interpretation  and I agree with the findings.    NIKOS JAVED MD         SYSTEM ID:  [id number]   XR Chest 2 Views    Narrative    Chest 2 views    INDICATION: Interval follow-up lung transplant, bilateral chest tubes    COMPARISON: Yesterday    Findings: Clamshell sternotomy from prior bilateral lung transplant  again noted. Left pleural effusion with possible loculation component  unchanged. Atelectasis or mild edema in the right upper lung zones  with another atelectasis in the left upper lung unchanged. Left chest  catheter again present. Right basilar thoracostomy tube again  present.  Loculated right hydropneumothorax unchanged.      Impression    IMPRESSION: No significant interval change with underlying bilateral  lung transplant.    ALVINA HARRY MD         SYSTEM ID:  [id number]

## 2022-08-04 NOTE — PLAN OF CARE
Pt A&Ox4, calls appropriately, no falls. Pt up to commode x1 overnight to void and have BM x2. Chest tubes remain in place. Pt on 1L NC, PRN oxy and scheduled tylenol given per patient request. Pt is tolerating TF overnight, but remains NPO. G tube to gravity drainage with green/bile output. Pt denies nausea overnight. Will continue to monitor.    Problem: Plan of Care - These are the overarching goals to be used throughout the patient stay.    Goal: Plan of Care Review/Shift Note  Description: The Plan of Care Review/Shift note should be completed every shift.  The Outcome Evaluation is a brief statement about your assessment that the patient is improving, declining, or no change.  This information will be displayed automatically on your shift note.  Outcome: Ongoing, Progressing  Flowsheets (Taken 8/3/2022 2319)  Plan of Care Reviewed With: patient  Outcome Evaluation: Pt reports no nausea, pain is better controlled with PRN oxy and scheduled tylenol  Overall Patient Progress: improving   Goal Outcome Evaluation:    Plan of Care Reviewed With: patient     Overall Patient Progress: improving    Outcome Evaluation: Pt reports no nausea, pain is better controlled with PRN oxy and scheduled tylenol

## 2022-08-04 NOTE — PROGRESS NOTES
[facility]     Received referral from [facility]y [facility] for Enteral feedings.  Benefits verified.  Patient meets Medicare criteria for enteral feedings, between [place] and [place] after [place]3.[place].  Anticipated length of need of 90 days or more must be documented in order for Medicare to cover.  Spoke with patient to review home infusion services, review benefits and offer choice of providers.  Patient would like to use [facility] for home infusion.  Sofie is expected to discharge soon and will be going home on enteral feeds via pump.  She has never done home enteral therapy before however she and her daughter will have some initial teaching by [facility] nurse tomorrow.  Met with patient at bedside and provided information about [facility] services.  Confirmed address, phone, and emergency contacts [facility] patient.  Explained about administration method via the Infinity pump with back pack for mobility.  Discussed options of hook up of TF at the hospital prior to discharge vs disconnect for transport home (if feasible per medical team) and restart of feeding at home.   Informed patient about supplies and delivery of supplies, storage of formula, plan for SNV and 24/7 availability of I staff while on enteral therapy.     Patient verbalized understanding of all information given.  She is willing to learn and manage home enteral therapy and states her daughters will be able to help   Questions answered.  Plan for patient to stay locally at [facility] after discharge.  Referred to [facility] to provide SNVs while staying locally.  [facility] Liaison will follow along to assist with discharge home with infusion needs.      Thank you for the referral.     RIYA Benz  [facility] Nurse Liaison   [email or web address]  Cell: [phone] M-F  [facility] Main: [phone]

## 2022-08-04 NOTE — PROGRESS NOTES
GASTROENTEROLOGY PROGRESS NOTE    Date of Admission: 6/28/2022  Reason for Admission: lung transplant       ASSESSMENT:  Sofie Rodriguez is a 62 year old female with a history of HFpEF, and end stage COPD s/p bilateral lung transplant (6/28/22) complicated by acute limb ischemia of LUE requiring left radial thrombectomy, EBV viremia and C.diff (vancomycin 7/12/22-7/28/22). She was found to have delayed gastric emptying on GES and underwent percutaneous GJ tube placement by IR on 7/27/22. GI consulted for development of G tube output concerning for upper GI bleed.      Patient s/p uneventful placement of gastrojejunostomy tube by IR on 7/27/22 and on 8/2/22 was found to have G tube output with black fluid concerning for GI bleed. She has remained hemodynamically stable. Recent hemoglobin baseline has been 7s-8s. She dropped Hgb to 6.5 on 8/3/22 and responded appropriately to 2 units pRBCs.     Underwent upper endoscopy evaluation 8/3/22 which showed large amount of fluid and solid food in stomach limiting visualization of gastric mucosa. A clean based ulcer was found at the pyloric channel suspicious for the source of bleeding. No concerns with inspection of G tube.     Regarding her known delayed gastric emptying, this is likely multifactorial with recent surgery/transplant, opioid use, decreased mobility. The ulcer at the pylorus and surrounding inflammation may also be playing a role in narrowing the pylorus. Plan to repeat EGD in 8 weeks to follow up on ulcer healing and pending clinical picture/EGD findings we may need to consider dilation if pyloric stricture develops from current inflammaion.        RECOMMENDATIONS:  - continue IV PPI BID; GI team will discuss bioavailability of PPI via J tube port   - Strict NPO to allow time for stomach to empty. Duration will be determined by G tube output and patient symptoms, anticipate ~5-7 days.   - Vent G tube to allow drainage of fluid/gas  - Monitor stools and G  "tube output for evidence of GI blood loss.   - GI will continue to follow     Outpatient GI follow up:   -  Repeat upper endoscopy in 8 weeks (from 8/3/22 procedure)to check healing of ulcer. Pending clinical picture and endoscopic findings may need to consider pyloric dilation if stricture develops from current inflammation.     The patient was discussed and plan agreed upon with GI staff, Dr. Andres.    Tenzin Arroyo MD  Gastroenterology Fellow - PGY4  [facility]   Page Me    _______________________________________________________________      Subjective: Nursing notes and 24hr events reviewed. No acute events overnight. Patient reports a good night of sleep. She was having 5/10 abdominal discomfort diffusely when we first visited today, this later resolved but she noted feeling bloated/distended. No nausea or vomiting. She had 2 stools overnight, no color charted.     We again reviewed yesterday's EGD findings and recommendation for strict NPO status to allow time for stomach to empty. Also discussed need for repeat EGD in ~8 weeks.     ROS:   4 pt ROS negative unless noted in subjective.     Objective:  Blood pressure 123/65, pulse 98, temperature 98.5  F (36.9  C), temperature source Oral, resp. rate 14, height 1.575 m (5' 2\"), weight 70 kg (154 lb 5.2 oz), SpO2 98 %, not currently breastfeeding.  Gen: Lying in bed. Appears comfortable  HEENT: NCAT. Conjunctiva clear. Sclera anicteric   CV: RRR, Peripheral perfusion intact  Resp: lungs clear anteriorly, no wheezing. Chest tubes in place.   Abd: Soft, NT, ND, no guarding or rebound, +BS. G tube site inspected and no erythema or drainage. Nontender around feeding tube site.   Msk: no gross deformity  Skin:  no jaundice  Ext: warm, well perfused   Neuro: grossly normal  Mental status/Psych: A&O. Asks/answers questions appropriately   Drains: chest tubes in place. GJ tube with G port to gravity, draining greenish-black fluid.         LABS:  Hgb 6.5 " --> 2u pRBCs --> 8.4  WBC 6.9  BUN 60 (recent range 70-80s)

## 2022-08-04 NOTE — PROGRESS NOTES
Patient AOX4 ib 1L NC. Left Chest tube removed by MD. CXR done. Rt chest tube in place to -20 suction. Rt Chest tube dressing done. TF infusing at 35 mL goal. G-tube to gravity. 1 loose, watery BM.   Up to commode to void X1 assist. PRN oxy and scheduled acetaminophen administered for pain. VSS. Will continue to monitor.

## 2022-08-04 NOTE — PROGRESS NOTES
[facility]    Medicine Progress Note - Hospitalist Service, GOLD TEAM 10    Date of Admission:  6/28/2022    Assessment & Plan        Sofie Rodriguez is a 62 year old female admitted on 6/28/2022. She has PMH of end stage COPD s/p b/l lung transplant on 6/28/2022, HTN, HFpEF, h/o hepC, osteopenia, and former methamphetamine use, and she was transferred to [place] [address] Medicine from MICU on 7/15/2022. She was admitted to the MICU on 7/13/20222 with prior hospital course complicated by left upper extremity acute limb ischemia s/p left radial thrombectomy on 7/1/2022. She was primarily treated for acute hypercapnic respiratory failure on intermittent BIPAP with worsening BACILIO while in the MICU. Breathing is improving, but patient has severely delayed gastric emptying found on NM study. PEGJ placed  Still awaiting chest tube removal and will discharge to ARU vs TCU when able.      Today's Plan:  -resuming heparin drip (okay per GI)  -left chest tube discontinued    End stage COPD s/p BSLT 6/28/2022  Acute hypercapnic hypoxic respiratory failure (improving)  EBV Viremia  likely 2/2 decreased central respiratory drive vs respiratory muscle weakness and some pulmonary edema / fluid Transplanted 6/28. formal SNIFF test was performed on 7/14 showed R hemidiaphragm palsy. Of note transplanted lungs were also considered small for body size and could contribute to decreased respiratory compensation for hypercarbia. VBG improving gradually  - oxycodone 5-10 mg q4h PRN  - encourage activity and getting up in bed; PT/OT participation  - encourage chest physiotherapy QID  - weaned off Bipap at night  Immunosuppression:  - Prednisone per pulm  - Tacrolimus per pulm  -  BID  Prophylaxis:  - CMV - Valgancyclovir  - Thrush - PO nysatin  - PJP - TMP-SMX; dapsone started 7/18 at 50mg qMWF     Severe Gastroparesis - gastric emptying study 7/20  - gastric emptying study 7/20 showed delayed  gastric emptying with retention of 95% of stomach contents after 4 hours;  - 7/27: PEGJ placed- tolerating tube feeds via J     Peptic ulcer at the pyloric junction with acute blood loss anemia in the setting of acute on chronic anemia-- had acute blood loss anemia after transplant-- had stabilized. Gradual hgb downtrend, then after inspection bronch her G tube was hooked back up to gravity and large brown/black output seen. EGD on 8/3 showed pyloric junction ulcer and swelling causing gastric outlet objection  -GI consult, appreciate assistance  -BID PPI switched to IV (was already on BID PPI PO)-stay IV for now  -NPO strict due to outlet obstruction and poor motility with G tube venting  -okay to resume heparin drip 8/4  - hgb transfusion 8/3-- hgb stable  -change all meds to J tube admin  -J feeds okay    NJ tube  Feeding regimen:   - Adult Formula Drip Feeding: Continuous Novasource Renal; Nasojejunal; Goal Rate: 35; initiate at goal rate; mL/hr; Medication - Feeding Tube Flush Frequency: At least 15-30 mL water before and after medication administration and with tube clogging    Pleural Effusion, Right and Left  Left chest tube removed 8/4, right chest tube remaining, appreciate CVTS assistance  - daily CXR  - RIGHT Chest Tube - still with outputs noted  - LEFT Apical Fluid collection - IR targeting via CT guided pigtail placement--removed 8/4     Hypotension, resolved  H/o HTN  H/o HFpEF  Likely vasoplegia post operatively. Last echo 7/7 normal EF with good LV function. S/p hydrocortisone 7/6-7/9 and fludrocortisone 7/6-7/11 without significant improvement.  - off midorine 7/19  - Holding PTA lasix 20mg daily-- diuresis intermittently    BACILIO-- recurrent, improved 8/1-- likely due to supratherapeutic tacrolimus  Hypermagnesemia  Hyperphosphatemia  Metabolic alkalosis  Baseline renal function was normal prior to surgery. New onset renal failure after transplant, likely multifactorial due to pre-renal  hypotension, less likely nephrotoxic agents. Overall kidney function improving. Today, Cr fluctuating but BUN increasing, with unclear urine output (7/22).   - HD cath removed 7/22, trend metabolites  - Adequate UOP despite Cr increase  - 8/1: 500 ml NS per Renal recs- tolerated; held fluid/diuresis on 8/2    C.diff - vanco started 7/12, course completed    Tachyarrthymia  Paroxysmal afib  Paroxysmal aflutter/AT  SVT vs afib.Had an occurrence during HD on 7/14. Had afib with RVR to 200s on 7/17. EP consult placed.asmyptomatic and stable during episodes. Aflutter episode (7/17) with transfer. Vagal maneuver responsive at time. No recent tachyarrhythmia episodes (7/22).   - Per EP: patient has had episodes of possible flutter (vs AT with 2:1 conduction) and afib with RVR  - heparin drip -- eventual plan to transition to DOAC after PEGJ placement and chest tubes resolved (CHADS-VASc score of 2)  - On telemetry  - On metoprolol tartrate 25mg BID  - Discharge on ziopatch for 14 days with EP follow up in 1-2 months after     Stress-induced hyperglycemia with intermittent hypoglycemia (8/3)  Has been controlled on 13-15 units of glargine BID  - on 7 units BID and will re-evaluate 8/5 since she is tapering steroid and has been NPO       Diet: Adult Formula Drip Feeding: Continuous Novasource Renal; Jejunostomy; Goal Rate: 35; mL/hr; Medication - Feeding Tube Flush Frequency: At least 15-30 mL water before and after medication administration and with tube clogging; Amount to Send (Nutri...  NPO for Medical/Clinical Reasons Except for: No Exceptions    DVT Prophylaxis: heparin drip  Dong Catheter: Not present  Central Lines: PRESENT     Cardiac Monitoring: None  Code Status: Full Code      Disposition Plan     Expected Discharge Date: 08/08/2022      Destination: home  Discharge Comments: TCU, lot's of delays  - Chest tubes still in place        The patient's care was discussed with the Patient and pulm Consultant.    Gaby  "AMINATA Malhotra MD  Hospitalist Service, GOLD TEAM 10  [facility]  Securely message with the [employer] Web Console (learn more here)  Text page via [facility] Paging/Directory   Please see signed in provider for up to date coverage information      Clinically Significant Risk Factors Present on Admission                      ______________________________________________________________________    Interval History   No fever/chills. Had good night of sleep for the last three nights she said. Breathing feels comfortable but still needing a small amt of oxygen via NC. No bleeding from G tube output but still dark brown/black. She says her stools are watery with bits of what looks like \"grass\" present. No nausea or vomiting.     Data reviewed today: I reviewed all medications, new labs and imaging results over the last 24 hours. I personally reviewed no images or EKG's today.    Physical Exam   Vital Signs: Temp: 98.5  F (36.9  C) Temp src: Oral BP: 123/65 Pulse: 98   Resp: 14 SpO2: 98 % O2 Device: Nasal cannula Oxygen Delivery: 1 LPM  Weight: 154 lbs 5.15 oz  Constitutional: Awake, alert and in no distress. Sitting up comfortably in bed  Head: Normocephalic. Atraumatic.  EENT: No conjunctival injection or icterus. Nares patent. Moist mucous membranes. Nasal cannula in place   Cardiovascular: Regular rate and rhythm. No murmurs, clicks, gallops or rubs. No edema  Respiratory: Some pleural rub on the right side but otherwise quite clear to auscultation without wheezes or crackles. Normal respiratory rate and effort.   Gastrointestinal: Abdomen soft, non-tender. G tube venting with brown/black output  Musculoskeletal: extremities normal- no gross deformities noted and normal muscle tone  Skin: no suspicious lesions, rashes, jaundice, or cyanosis noted on exposed skin.  Psychiatric: mentation appears normal and affect normal/bright      Data   Recent Labs   Lab 08/04/22 2001 08/04/22  1626 " 08/04/22  1526 08/04/22  1202 08/04/22  0902 08/03/22  0756 08/03/22  0633 08/02/22  1526 08/02/22  1311 08/02/22  0902 08/02/22  0757 08/01/22  1218 08/01/22  0832   WBC  --   --   --   --  6.9  --  5.4  --   --   --  5.5  --   --    HGB  --   --   --   --  8.4*  --  6.5*  --  7.4*  --  7.5*  --   --    MCV  --   --   --   --  99  --  105*  --   --   --  104*  --   --    PLT  --   --   --   --  215  --  202  --   --   --  216  --   --    INR  --   --   --   --   --   --  0.87  --   --   --   --   --   --    NA  --   --   --   --  140  --  140  --   --   --  141  --  140   POTASSIUM  --   --   --   --  4.9  --  5.1  --   --   --  4.6  --  4.2   CHLORIDE  --   --   --   --  96*  --  94*  --   --   --  94*  --  91*   CO2  --   --   --   --  41*  --  45*  --   --   --  47*  --  47*   BUN  --   --   --   --  60.3*  --  73.5*  --   --   --  76.0*  --  80.2*   CR  --   --   --   --  1.20*  --  1.39*  --   --   --  1.40*  --  1.67*   ANIONGAP  --   --   --   --  3*  --  1*  --   --   --  <1*  --  2*   ESTUARDO  --   --   --   --  9.4  --  9.2  --   --   --  9.0  --  9.0   * 160* 170*   < > 122*   < > 113*   < >  --    < > 96   < > 144*   ALBUMIN  --   --   --   --  2.8*  --   --   --   --   --   --   --  2.8*   PROTTOTAL  --   --   --   --  5.4*  --   --   --   --   --   --   --  5.3*   BILITOTAL  --   --   --   --  0.2  --   --   --   --   --   --   --  0.2   ALKPHOS  --   --   --   --  60  --   --   --   --   --   --   --  68   ALT  --   --   --   --  12  --   --   --   --   --   --   --  12   AST  --   --   --   --  22  --   --   --   --   --   --   --  22    < > = values in this interval not displayed.     Recent Results (from the past 24 hour(s))   XR Chest 2 Views    Narrative    Exam:  Chest 2 view    History: 62-year-old female with hx of end stage COPD, status post  bilateral lung transplant June 2022, heart failure, hypertension,  history of hepatitis C, former methamphetamine use.    Indication: Interval  follow up lung transplant, bilateral chest tubes  are    Comparison: Chest x-ray 8/3/2022.    Findings: PA and lateral views of the chest. The trachea is midline.  Left approach midline catheter PICC terminates in the high left  axilla. Postoperative changes of bilateral lung transplant with  surgical clips, and sternotomy wires from clamshell sternotomy. Stable  position of left chest tube with tip projecting in the superior  portion of the left hemithorax. Stable position of right chest tube  with tip and sidehole projecting in the right lower hemithorax.  Redemonstration of left pleural effusion with evidence of loculation.  Unchanged loculated right hydropneumothorax. Patchy atelectasis of the  left upper lobe. The visualized bones and upper abdomen are  unremarkable      Impression    Impression:  1. No significant interval change of bilateral lung transplants. With  stable positioning of bilateral chest tubes and redemonstration of  bilateral loculated pleural effusions.  2. Continued left upper lobe patchy atelectasis not significantly  changed from prior.  3. Left axillary PICC terminates in the high left axilla.     I have personally reviewed the examination and initial interpretation  and I agree with the findings.    JOHANN MORAES MD         SYSTEM ID:  [id number]   XR Chest Port 1 View    Impression    RESIDENT PRELIMINARY INTERPRETATION  IMPRESSION:   1. Interval removal of left apical chest tube.  2. Stable moderate left loculated pleural effusion.  3. Simple right loculated small hydropneumothorax.  4. Stable left upper lobe patchy atelectasis.     Medications     dextrose Stopped (07/15/22 1801)     heparin Stopped (08/02/22 0900)       acetaminophen  975 mg Oral or Feeding Tube TID     acetylcysteine  2 mL Nebulization TID     [Held by provider] aspirin  81 mg Oral Daily     calcium carbonate 600 mg-vitamin D 400 units  1 tablet Per Feeding Tube BID w/meals     dapsone  50 mg Oral or Feeding Tube  Once per day on Mon Wed Fri     insulin aspart  1-12 Units Subcutaneous Q4H     insulin glargine  7 Units Subcutaneous BID     levalbuterol  1.25 mg Nebulization TID     metoprolol tartrate  25 mg Per Feeding Tube BID     multivitamin, therapeutic  1 tablet Per Feeding Tube Daily     mycophenolate  500 mg Oral or Feeding Tube BID     nystatin   Topical BID     nystatin  1,000,000 Units Swish & Swallow 4x Daily     ondansetron  4 mg Oral Daily     pantoprazole (PROTONIX) IV  40 mg Intravenous BID     predniSONE  12.5 mg Oral QAM    And     predniSONE  10 mg Oral QPM     protein modular  1 packet Per Feeding Tube Daily     sodium chloride (PF)  10 mL Intracatheter Q8H     sodium chloride (PF)  10 mL Intracatheter Q8H     sodium chloride (PF)  3 mL Intracatheter Q8H     tacrolimus  3 mg Per G Tube QAM    And     tacrolimus  3 mg Per G Tube QPM     thiamine  100 mg Oral or Feeding Tube Daily     valGANciclovir  450 mg Oral or Feeding Tube Daily

## 2022-08-04 NOTE — PROGRESS NOTES
"  Brief CVTS Progress Note  08/4/2022     Sofie Rodriguez is a 62 year old female with PMH significant for oxygen-dependent COPD, HF, HTN, HCV, osteopenia, and methamphetamine abuse in remission.  She is now s/p BSLT by Dr. Sunshine on 6/28/2022.  Her post-operative course has been complicated by LUE critical limb ischemia now s/p left radial thrombectomy on 7/1/2022, hypercapneic respiratory insufficiency, BACILIO, and dysphagia.     A/P:  S/p BLST on 6/28/2022, post op course c/b LUE critical limb ischemia now s/p left radial thrombectomy on 7/1/2022, hypercapneic respiratory insufficiency, BACILIO, and dysphagia.       - Chest tube output past 24h from the right 200 ml, keep right pleural chest tube for drainage. No air leaks.    - IR-placed left apical pigtail chest tube on 7/25; management per Pulmonology. Discussed with pulmonary, no drainage from left pigtail. I removed without complication today.   - Per surgeon, lungs did not fill chest space  - Daily wound care per nursing:  Wound cleanser to clamshell incision, pat dry, may be left open to air; keep incision C/D/I  - Remainder of plan per Pulm Transplant/Medicine teams  - Bronch scheduled for today.      Discussed with Dr. Rivas as needed    Penny Mallory PA-C  Cardiothoracic Surgery  Pager [phone]  August 4, 2022         Interval History:   No acute complaints          Physical Exam:     Vitals: /66 (BP Location: Right arm, Cuff Size: Adult Regular)   Pulse 87   Temp 99  F (37.2  C) (Oral)   Resp 15   Ht 1.575 m (5' 2\")   Wt 70 kg (154 lb 5.2 oz)   SpO2 99%   BMI 28.23 kg/m    BMI= Body mass index is 28.23 kg/m .    Gen: A&Ox4, NAD  Neuro: no focal deficits   CV: NSR on tele.   Pulm: Breathing regular and non-labored  Incision: C/D/I, healing nicely             Data:    Imaging:  Recent Results (from the past 24 hour(s))   XR Chest 2 Views    Narrative    Exam:  Chest 2 view    History: 62-year-old female with hx of end stage COPD, " status post  bilateral lung transplant June 2022, heart failure, hypertension,  history of hepatitis C, former methamphetamine use.    Indication: Interval follow up lung transplant, bilateral chest tubes  are    Comparison: Chest x-ray 8/3/2022.    Findings: PA and lateral views of the chest. The trachea is midline.  Left approach midline catheter PICC terminates in the high left  axilla. Postoperative changes of bilateral lung transplant with  surgical clips, and sternotomy wires from clamshell sternotomy. Stable  position of left chest tube with tip projecting in the superior  portion of the left hemithorax. Stable position of right chest tube  with tip and sidehole projecting in the right lower hemithorax.  Redemonstration of left pleural effusion with evidence of loculation.  Unchanged loculated right hydropneumothorax. Patchy atelectasis of the  left upper lobe. The visualized bones and upper abdomen are  unremarkable      Impression    Impression:  1. No significant interval change of bilateral lung transplants. With  stable positioning of bilateral chest tubes and redemonstration of  bilateral loculated pleural effusions.  2. Continued left upper lobe patchy atelectasis not significantly  changed from prior.  3. Left axillary PICC terminates in the high left axilla.     I have personally reviewed the examination and initial interpretation  and I agree with the findings.    JOHANN MORAES MD         SYSTEM ID:  [id number]          [facility]  Recent Labs   Lab 08/04/22  1626 08/04/22  1526 08/04/22  1221 08/04/22  1202 08/04/22  0902 08/03/22  0756 08/03/22  0633 08/02/22  0902 08/02/22  0757 08/01/22  1218 08/01/22  0832   NA  --   --   --   --  140  --  140  --  141  --  140   POTASSIUM  --   --   --   --  4.9  --  5.1  --  4.6  --  4.2   CHLORIDE  --   --   --   --  96*  --  94*  --  94*  --  91*   ESTUARDO  --   --   --   --  9.4  --  9.2  --  9.0  --  9.0   CO2  --   --   --   --  41*  --  45*  --  47*  --  47*   BUN   --   --   --   --  60.3*  --  73.5*  --  76.0*  --  80.2*   CR  --   --   --   --  1.20*  --  1.39*  --  1.40*  --  1.67*   * 170* 133* 113* 122*   < > 113*   < > 96   < > 144*    < > = values in this interval not displayed.     CBC  Recent Labs   Lab 08/04/22  0902 08/03/22  0633 08/02/22  1311 08/02/22  0757 07/31/22  0622   WBC 6.9 5.4  --  5.5 6.3   RBC 2.77* 2.11*  --  2.45* 2.61*   HGB 8.4* 6.5* 7.4* 7.5* 8.0*   HCT 27.5* 22.2*  --  25.4* 26.7*   MCV 99 105*  --  104* 102*   MCH 30.3 30.8  --  30.6 30.7   MCHC 30.5* 29.3*  --  29.5* 30.0*   RDW 19.9* 18.6*  --  18.5* 19.1*    202  --  216 252     INR  Recent Labs   Lab 08/03/22  0633   INR 0.87      Hepatic Panel   Recent Labs   Lab 08/04/22  0902 08/01/22  0832   AST 22 22   ALT 12 12   ALKPHOS 60 68   BILITOTAL 0.2 0.2   ALBUMIN 2.8* 2.8*     Glucose  Recent Labs   Lab 08/04/22  1626 08/04/22  1526 08/04/22  1221 08/04/22  1202 08/04/22  0902 08/04/22  0841   * 170* 133* 113* 122* 126*

## 2022-08-05 ENCOUNTER — TELEPHONE (OUTPATIENT)
Dept: TRANSPLANT | Facility: CLINIC | Age: 62
End: 2022-08-05

## 2022-08-05 ENCOUNTER — APPOINTMENT (OUTPATIENT)
Dept: GENERAL RADIOLOGY | Facility: CLINIC | Age: 62
DRG: 007 | End: 2022-08-05
Attending: PHYSICIAN ASSISTANT
Payer: MEDICARE

## 2022-08-05 ENCOUNTER — APPOINTMENT (OUTPATIENT)
Dept: EDUCATION SERVICES | Facility: CLINIC | Age: 62
DRG: 007 | End: 2022-08-05
Attending: INTERNAL MEDICINE
Payer: MEDICARE

## 2022-08-05 LAB
ANION GAP SERPL CALCULATED.3IONS-SCNC: 3 MMOL/L (ref 7–15)
BASOPHILS # BLD AUTO: 0 10E3/UL (ref 0–0.2)
BASOPHILS NFR BLD AUTO: 0 %
BUN SERPL-MCNC: 58.6 MG/DL (ref 8–23)
CALCIUM SERPL-MCNC: 9 MG/DL (ref 8.8–10.2)
CHLORIDE SERPL-SCNC: 97 MMOL/L (ref 98–107)
CREAT SERPL-MCNC: 1.23 MG/DL (ref 0.51–0.95)
DEPRECATED HCO3 PLAS-SCNC: 41 MMOL/L (ref 22–29)
EOSINOPHIL # BLD AUTO: 0.1 10E3/UL (ref 0–0.7)
EOSINOPHIL NFR BLD AUTO: 2 %
ERYTHROCYTE [DISTWIDTH] IN BLOOD BY AUTOMATED COUNT: 19.1 % (ref 10–15)
GFR SERPL CREATININE-BSD FRML MDRD: 50 ML/MIN/1.73M2
GLUCOSE BLDC GLUCOMTR-MCNC: 119 MG/DL (ref 70–99)
GLUCOSE BLDC GLUCOMTR-MCNC: 123 MG/DL (ref 70–99)
GLUCOSE BLDC GLUCOMTR-MCNC: 134 MG/DL (ref 70–99)
GLUCOSE BLDC GLUCOMTR-MCNC: 154 MG/DL (ref 70–99)
GLUCOSE BLDC GLUCOMTR-MCNC: 164 MG/DL (ref 70–99)
GLUCOSE BLDC GLUCOMTR-MCNC: 174 MG/DL (ref 70–99)
GLUCOSE SERPL-MCNC: 142 MG/DL (ref 70–99)
HCT VFR BLD AUTO: 26.9 % (ref 35–47)
HGB BLD-MCNC: 8.1 G/DL (ref 11.7–15.7)
IMM GRANULOCYTES # BLD: 0.2 10E3/UL
IMM GRANULOCYTES NFR BLD: 2 %
LACTATE SERPL-SCNC: 0.4 MMOL/L (ref 0.7–2)
LYMPHOCYTES # BLD AUTO: 0.2 10E3/UL (ref 0.8–5.3)
LYMPHOCYTES NFR BLD AUTO: 2 %
MAGNESIUM SERPL-MCNC: 2.3 MG/DL (ref 1.7–2.3)
MCH RBC QN AUTO: 30.1 PG (ref 26.5–33)
MCHC RBC AUTO-ENTMCNC: 30.1 G/DL (ref 31.5–36.5)
MCV RBC AUTO: 100 FL (ref 78–100)
MONOCYTES # BLD AUTO: 0.5 10E3/UL (ref 0–1.3)
MONOCYTES NFR BLD AUTO: 6 %
NEUTROPHILS # BLD AUTO: 6.4 10E3/UL (ref 1.6–8.3)
NEUTROPHILS NFR BLD AUTO: 88 %
NRBC # BLD AUTO: 0 10E3/UL
NRBC BLD AUTO-RTO: 0 /100
PHOSPHATE SERPL-MCNC: 3.8 MG/DL (ref 2.5–4.5)
PLATELET # BLD AUTO: 214 10E3/UL (ref 150–450)
POTASSIUM SERPL-SCNC: 4.5 MMOL/L (ref 3.4–5.3)
RBC # BLD AUTO: 2.69 10E6/UL (ref 3.8–5.2)
SARS-COV-2 RNA RESP QL NAA+PROBE: NEGATIVE
SODIUM SERPL-SCNC: 141 MMOL/L (ref 136–145)
UFH PPP CHRO-ACNC: 0.37 IU/ML
UFH PPP CHRO-ACNC: 0.43 IU/ML
WBC # BLD AUTO: 7.4 10E3/UL (ref 4–11)

## 2022-08-05 PROCEDURE — 71046 X-RAY EXAM CHEST 2 VIEWS: CPT | Mod: 26 | Performed by: RADIOLOGY

## 2022-08-05 PROCEDURE — 250N000011 HC RX IP 250 OP 636: Performed by: INTERNAL MEDICINE

## 2022-08-05 PROCEDURE — 84100 ASSAY OF PHOSPHORUS: CPT | Performed by: STUDENT IN AN ORGANIZED HEALTH CARE EDUCATION/TRAINING PROGRAM

## 2022-08-05 PROCEDURE — 99233 SBSQ HOSP IP/OBS HIGH 50: CPT | Mod: 24 | Performed by: NURSE PRACTITIONER

## 2022-08-05 PROCEDURE — 250N000013 HC RX MED GY IP 250 OP 250 PS 637: Performed by: THORACIC SURGERY (CARDIOTHORACIC VASCULAR SURGERY)

## 2022-08-05 PROCEDURE — 250N000012 HC RX MED GY IP 250 OP 636 PS 637: Performed by: NURSE PRACTITIONER

## 2022-08-05 PROCEDURE — 36592 COLLECT BLOOD FROM PICC: CPT | Performed by: STUDENT IN AN ORGANIZED HEALTH CARE EDUCATION/TRAINING PROGRAM

## 2022-08-05 PROCEDURE — 250N000012 HC RX MED GY IP 250 OP 636 PS 637: Performed by: INTERNAL MEDICINE

## 2022-08-05 PROCEDURE — 250N000013 HC RX MED GY IP 250 OP 250 PS 637: Performed by: HOSPITALIST

## 2022-08-05 PROCEDURE — 250N000013 HC RX MED GY IP 250 OP 250 PS 637: Performed by: INTERNAL MEDICINE

## 2022-08-05 PROCEDURE — 250N000013 HC RX MED GY IP 250 OP 250 PS 637: Performed by: SURGERY

## 2022-08-05 PROCEDURE — 250N000009 HC RX 250: Performed by: NURSE PRACTITIONER

## 2022-08-05 PROCEDURE — 83605 ASSAY OF LACTIC ACID: CPT | Performed by: INTERNAL MEDICINE

## 2022-08-05 PROCEDURE — 94640 AIRWAY INHALATION TREATMENT: CPT

## 2022-08-05 PROCEDURE — 250N000011 HC RX IP 250 OP 636: Performed by: HOSPITALIST

## 2022-08-05 PROCEDURE — 94640 AIRWAY INHALATION TREATMENT: CPT | Mod: 76

## 2022-08-05 PROCEDURE — 85520 HEPARIN ASSAY: CPT | Performed by: INTERNAL MEDICINE

## 2022-08-05 PROCEDURE — 250N000011 HC RX IP 250 OP 636

## 2022-08-05 PROCEDURE — 999N000157 HC STATISTIC RCP TIME EA 10 MIN

## 2022-08-05 PROCEDURE — 250N000009 HC RX 250: Performed by: PHYSICIAN ASSISTANT

## 2022-08-05 PROCEDURE — 80048 BASIC METABOLIC PNL TOTAL CA: CPT | Performed by: STUDENT IN AN ORGANIZED HEALTH CARE EDUCATION/TRAINING PROGRAM

## 2022-08-05 PROCEDURE — 71046 X-RAY EXAM CHEST 2 VIEWS: CPT

## 2022-08-05 PROCEDURE — 36592 COLLECT BLOOD FROM PICC: CPT | Performed by: INTERNAL MEDICINE

## 2022-08-05 PROCEDURE — 99232 SBSQ HOSP IP/OBS MODERATE 35: CPT | Mod: 24 | Performed by: INTERNAL MEDICINE

## 2022-08-05 PROCEDURE — 250N000013 HC RX MED GY IP 250 OP 250 PS 637: Performed by: NURSE PRACTITIONER

## 2022-08-05 PROCEDURE — 85004 AUTOMATED DIFF WBC COUNT: CPT | Performed by: STUDENT IN AN ORGANIZED HEALTH CARE EDUCATION/TRAINING PROGRAM

## 2022-08-05 PROCEDURE — U0003 INFECTIOUS AGENT DETECTION BY NUCLEIC ACID (DNA OR RNA); SEVERE ACUTE RESPIRATORY SYNDROME CORONAVIRUS 2 (SARS-COV-2) (CORONAVIRUS DISEASE [COVID-19]), AMPLIFIED PROBE TECHNIQUE, MAKING USE OF HIGH THROUGHPUT TECHNOLOGIES AS DESCRIBED BY CMS-2020-01-R: HCPCS | Performed by: INTERNAL MEDICINE

## 2022-08-05 PROCEDURE — 94668 MNPJ CHEST WALL SBSQ: CPT

## 2022-08-05 PROCEDURE — 83735 ASSAY OF MAGNESIUM: CPT | Performed by: STUDENT IN AN ORGANIZED HEALTH CARE EDUCATION/TRAINING PROGRAM

## 2022-08-05 PROCEDURE — 250N000013 HC RX MED GY IP 250 OP 250 PS 637: Performed by: STUDENT IN AN ORGANIZED HEALTH CARE EDUCATION/TRAINING PROGRAM

## 2022-08-05 PROCEDURE — 99233 SBSQ HOSP IP/OBS HIGH 50: CPT | Performed by: INTERNAL MEDICINE

## 2022-08-05 PROCEDURE — C9113 INJ PANTOPRAZOLE SODIUM, VIA: HCPCS | Performed by: INTERNAL MEDICINE

## 2022-08-05 PROCEDURE — 120N000002 HC R&B MED SURG/OB UMMC

## 2022-08-05 RX ORDER — ACETYLCYSTEINE 200 MG/ML
2 SOLUTION ORAL; RESPIRATORY (INHALATION) 2 TIMES DAILY
Status: DISCONTINUED | OUTPATIENT
Start: 2022-08-05 | End: 2022-08-15

## 2022-08-05 RX ORDER — ACETAMINOPHEN 325 MG/1
975 TABLET ORAL
Status: DISCONTINUED | OUTPATIENT
Start: 2022-08-05 | End: 2022-08-05

## 2022-08-05 RX ORDER — LEVALBUTEROL INHALATION SOLUTION 1.25 MG/3ML
1.25 SOLUTION RESPIRATORY (INHALATION)
Status: DISCONTINUED | OUTPATIENT
Start: 2022-08-05 | End: 2022-08-15

## 2022-08-05 RX ORDER — ASPIRIN 81 MG/1
81 TABLET, CHEWABLE ORAL DAILY
Status: DISCONTINUED | OUTPATIENT
Start: 2022-08-06 | End: 2022-08-15

## 2022-08-05 RX ORDER — PREDNISONE 10 MG/1
10 TABLET ORAL EVERY EVENING
Status: DISCONTINUED | OUTPATIENT
Start: 2022-08-05 | End: 2022-08-17 | Stop reason: HOSPADM

## 2022-08-05 RX ORDER — MYCOPHENOLATE MOFETIL 200 MG/ML
500 POWDER, FOR SUSPENSION ORAL 2 TIMES DAILY
Status: DISCONTINUED | OUTPATIENT
Start: 2022-08-05 | End: 2022-08-07

## 2022-08-05 RX ORDER — DAPSONE 25 MG/1
50 TABLET ORAL
Status: DISCONTINUED | OUTPATIENT
Start: 2022-08-05 | End: 2022-08-17 | Stop reason: HOSPADM

## 2022-08-05 RX ORDER — ACETAMINOPHEN 325 MG/1
975 TABLET ORAL
Status: DISCONTINUED | OUTPATIENT
Start: 2022-08-05 | End: 2022-08-15

## 2022-08-05 RX ADMIN — ONDANSETRON 4 MG: 4 TABLET, ORALLY DISINTEGRATING ORAL at 09:16

## 2022-08-05 RX ADMIN — PANTOPRAZOLE SODIUM 40 MG: 40 INJECTION, POWDER, FOR SOLUTION INTRAVENOUS at 09:15

## 2022-08-05 RX ADMIN — Medication 40 MG: at 22:31

## 2022-08-05 RX ADMIN — OXYCODONE HYDROCHLORIDE 10 MG: 5 TABLET ORAL at 22:40

## 2022-08-05 RX ADMIN — METOPROLOL TARTRATE 25 MG: 25 TABLET, FILM COATED ORAL at 20:29

## 2022-08-05 RX ADMIN — PROCHLORPERAZINE MALEATE 10 MG: 5 TABLET ORAL at 22:26

## 2022-08-05 RX ADMIN — OXYCODONE HYDROCHLORIDE 10 MG: 5 TABLET ORAL at 04:26

## 2022-08-05 RX ADMIN — OXYCODONE HYDROCHLORIDE 10 MG: 5 TABLET ORAL at 18:38

## 2022-08-05 RX ADMIN — INSULIN ASPART 2 UNITS: 100 INJECTION, SOLUTION INTRAVENOUS; SUBCUTANEOUS at 15:30

## 2022-08-05 RX ADMIN — THERA TABS 1 TABLET: TAB at 11:46

## 2022-08-05 RX ADMIN — METOPROLOL TARTRATE 25 MG: 25 TABLET, FILM COATED ORAL at 09:16

## 2022-08-05 RX ADMIN — NYSTATIN 1000000 UNITS: 100000 SUSPENSION ORAL at 13:37

## 2022-08-05 RX ADMIN — MYCOPHENOLATE MOFETIL 500 MG: 200 POWDER, FOR SUSPENSION ORAL at 20:29

## 2022-08-05 RX ADMIN — PREDNISONE 10 MG: 10 TABLET ORAL at 20:29

## 2022-08-05 RX ADMIN — ACETYLCYSTEINE 2 ML: 200 INHALANT RESPIRATORY (INHALATION) at 09:29

## 2022-08-05 RX ADMIN — MYCOPHENOLATE MOFETIL 500 MG: 200 POWDER, FOR SUSPENSION ORAL at 09:17

## 2022-08-05 RX ADMIN — THIAMINE HCL TAB 100 MG 100 MG: 100 TAB at 09:16

## 2022-08-05 RX ADMIN — ANTACID TABLETS 1000 MG: 500 TABLET, CHEWABLE ORAL at 22:26

## 2022-08-05 RX ADMIN — LEVALBUTEROL HYDROCHLORIDE 1.25 MG: 1.25 SOLUTION RESPIRATORY (INHALATION) at 09:29

## 2022-08-05 RX ADMIN — NYSTATIN 1000000 UNITS: 100000 SUSPENSION ORAL at 17:19

## 2022-08-05 RX ADMIN — NYSTATIN 1000000 UNITS: 100000 SUSPENSION ORAL at 09:15

## 2022-08-05 RX ADMIN — HEPARIN SODIUM 850 UNITS/HR: 10000 INJECTION, SOLUTION INTRAVENOUS at 23:14

## 2022-08-05 RX ADMIN — Medication 40 MG: at 20:29

## 2022-08-05 RX ADMIN — PREDNISONE 12.5 MG: 2.5 TABLET ORAL at 09:15

## 2022-08-05 RX ADMIN — LEVALBUTEROL HYDROCHLORIDE 1.25 MG: 1.25 SOLUTION RESPIRATORY (INHALATION) at 19:20

## 2022-08-05 RX ADMIN — TACROLIMUS 3 MG: 5 CAPSULE ORAL at 17:19

## 2022-08-05 RX ADMIN — ACETYLCYSTEINE 2 ML: 200 INHALANT RESPIRATORY (INHALATION) at 19:20

## 2022-08-05 RX ADMIN — INSULIN ASPART 1 UNITS: 100 INJECTION, SOLUTION INTRAVENOUS; SUBCUTANEOUS at 00:24

## 2022-08-05 RX ADMIN — ACETAMINOPHEN 975 MG: 325 TABLET, FILM COATED ORAL at 09:15

## 2022-08-05 RX ADMIN — NYSTATIN: 100000 CREAM TOPICAL at 20:30

## 2022-08-05 RX ADMIN — OXYCODONE HYDROCHLORIDE 10 MG: 5 TABLET ORAL at 11:47

## 2022-08-05 RX ADMIN — TACROLIMUS 3 MG: 5 CAPSULE ORAL at 09:25

## 2022-08-05 RX ADMIN — CALCIUM CARBONATE 600 MG (1,500 MG)-VITAMIN D3 400 UNIT TABLET 1 TABLET: at 09:16

## 2022-08-05 RX ADMIN — DAPSONE 50 MG: 25 TABLET ORAL at 09:24

## 2022-08-05 RX ADMIN — Medication 1 PACKET: at 11:46

## 2022-08-05 RX ADMIN — CALCIUM CARBONATE 600 MG (1,500 MG)-VITAMIN D3 400 UNIT TABLET 1 TABLET: at 17:20

## 2022-08-05 RX ADMIN — VALGANCICLOVIR HYDROCHLORIDE 450 MG: 50 POWDER, FOR SOLUTION ORAL at 09:16

## 2022-08-05 RX ADMIN — NYSTATIN: 100000 CREAM TOPICAL at 09:14

## 2022-08-05 RX ADMIN — INSULIN ASPART 1 UNITS: 100 INJECTION, SOLUTION INTRAVENOUS; SUBCUTANEOUS at 04:30

## 2022-08-05 RX ADMIN — ACETAMINOPHEN 975 MG: 325 TABLET, FILM COATED ORAL at 20:29

## 2022-08-05 RX ADMIN — NYSTATIN 1000000 UNITS: 100000 SUSPENSION ORAL at 22:25

## 2022-08-05 ASSESSMENT — ACTIVITIES OF DAILY LIVING (ADL)
ADLS_ACUITY_SCORE: 31

## 2022-08-05 NOTE — PROGRESS NOTES
Patient AOX4 on 1L NC. Tried weaning patient off O2 but saturation would drop to mid 80s.Rt chest tube in place to -20 suction. TF infusing at 35 mL goal. G-tube to gravity.   Heparin drip infusing and therapeutic. Up to commode to void with assist. PRN oxy  administered for pain. Received education on peg tube and how to administer medications at home with daughter Julia at bedside. VSS. Will continue to monitor.

## 2022-08-05 NOTE — PROGRESS NOTES
GASTROENTEROLOGY PROGRESS NOTE    Date of Admission: 6/28/2022  Reason for Admission: lung transplant       ASSESSMENT:  Sofie Rodriguez is a 62 year old female with a history of HFpEF, and end stage COPD s/p bilateral lung transplant (6/28/22) complicated by acute limb ischemia of LUE requiring left radial thrombectomy, EBV viremia and C.diff (vancomycin 7/12/22-7/28/22). She was found to have delayed gastric emptying on GES and underwent percutaneous GJ tube placement by IR on 7/27/22. GI consulted for development of G tube output concerning for upper GI bleed.      Patient s/p uneventful placement of gastrojejunostomy tube by IR on 7/27/22 and on 8/2/22 was found to have G tube output with black fluid concerning for GI bleed. She has remained hemodynamically stable. Recent hemoglobin baseline has been 7s-8s. She dropped Hgb to 6.5 on 8/3/22 and responded appropriately to 2 units pRBCs.     Underwent upper endoscopy evaluation 8/3/22 which showed large amount of fluid and solid food in stomach limiting visualization of gastric mucosa. A clean based ulcer was found at the pyloric channel suspicious for the source of bleeding. No concerns with inspection of G tube.     Regarding her known delayed gastric emptying, this is likely multifactorial with recent surgery/transplant, opioid use, decreased mobility. The ulcer at the pylorus and surrounding inflammation may also be playing a role in narrowing the pylorus. Plan to repeat EGD in 8 weeks to follow up on ulcer healing and pending clinical picture/EGD findings we may need to consider dilation if pyloric stricture develops from current inflammaion.        RECOMMENDATIONS:  - reasonable to transition from IV to PO pantoprazole solution BID given via J tube port. Per discussion with pharmacy and literature review the PPI should have similar bioavailably when administered directly to jejunum and achieve similar pH goals as giving gastric.   - Strict NPO to allow  "time for stomach to empty. Duration will be determined by G tube output and patient symptoms. Would recommend 7 days of strict NPO followed by slow advance of diet to liquid diet and then low fiber diet if tolerating liquids.   - Vent G tube to allow drainage of fluid/gas  - Monitor stools and G tube output for evidence of GI blood loss.       Outpatient GI follow up:   -  Repeat upper endoscopy in 8 weeks (GI will arrange) to check healing of ulcer. Pending clinical picture and endoscopic findings may need to consider pyloric dilation if stricture develops from current inflammation.     GI team will sign off. Please reach out if other questions or concerns arise.     The patient was discussed and plan agreed upon with GI staff, Dr. Hodges.    Tenzin Arroyo MD  Gastroenterology Fellow - PGY4  [facility]   Page Me    _______________________________________________________________      Subjective:   Nursing notes reviewed, no acute events overnight. Patient denies nausea, vomiting, abdominal pain or bloating. The pain and fullness in her abdomen has resolved since yesterday. She had bowel movement overnight that was loose. Did not check the color. No notes of bloody stool or melena in nursing notes.     ROS:   4 pt ROS negative unless noted in subjective.     Objective:  Blood pressure (!) 144/74, pulse 98, temperature 97.8  F (36.6  C), temperature source Axillary, resp. rate 16, height 1.575 m (5' 2\"), weight 70 kg (154 lb 5.2 oz), SpO2 100 %, not currently breastfeeding.  Gen: sitting up in chair, appears comfortable.   HEENT: NCAT. Conjunctiva clear. Sclera anicteric   CV: RRR, Peripheral perfusion intact  Resp: lungs clear anteriorly, no wheezing. Chest tubes in place.   Abd: Soft, NT, ND, no guarding or rebound, +BS. G tube site inspected and no erythema or drainage. Nontender around feeding tube site.   Msk: no gross deformity  Skin:  no jaundice  Ext: warm, well perfused   Neuro: grossly " normal  Mental status/Psych: A&O. Asks/answers questions appropriately   Drains:  GJ tube with G port to gravity, draining greenish-black fluid.         LABS:  Hgb stable at 8.1 today   WBC 7.4  BUN 58 (recent range 70-80s)

## 2022-08-05 NOTE — PROGRESS NOTES
Inpatient Lung Transplant Coordinator Note:    Met with Sofie and daughter, Julia.  Provided patient with 'Adult Lung Transplant Guide' as well as 'Organ Transplant Handbook' with SOT office contact information both during and after office hours. Medication card in progress by nursing staff.    Reviewed the role of the caregiver, resources at home to review such as [facility]plant Place videos. Also reviewed transplant lab manual, home vitals and monitoring. Discussed what to expect for outpatient follow up visits such as drug level monitoring, labs, PFTs, chest x-ray, bronchoscopies, etc. Also reviewed organ rejection, incision care, physical restrictions related to sternotomy, infection, environmental modifications, and food safety post transplant.     Anticipate discharge to local housing once chest tubes removed and pt is medically ready - potentially mid next week. Lots of education today (pharmacy, transplant, enteral tube med/TF administration). Urged pt to call SOT office or speak with transplant team if any further questions or concerns arise once pt is discharged. Pt and family denied further questions related to transplant content and discharge instructions at this time. Will continue to check in throughout hospitalization.        Ann-Marie Armendariz, RN, BSN, PHN  Inpatient Lung Transplant Coordinator  Solid Organ Transplant  [facility]  Pager: [phone]

## 2022-08-05 NOTE — PROGRESS NOTES
This is a recent snapshot of the patient's [facility] medical record.  For current drug dose and complete information and questions, call [phone] or In Basket pool, [facility] home infusion ([id number])  CSN Number:  [id number]

## 2022-08-05 NOTE — PROGRESS NOTES
Pt refused bipap @ noc. Pt request bipap to be removed from room. Bipap taken from room and ordering provider notified of event.     Emily Hsu, RT

## 2022-08-05 NOTE — CONSULTS
Sofie and her daughter Julia were seen bedside for GJ tube cares and feedings. Sofie observed but did fall asleep during class. Julia did well with all skills on model and use of the infinity pump for home feeds. She asked good questions and stated understanding of all information. Julia will not be the family member who will be with Sofie the first week of discharge so that family member will need to be taught by nursing and homecare.    Literature given: Handwashing and Skin Care, Caring for Your Tube at Home. Using the Enteralite Infinity Pump for Tube Feeding,

## 2022-08-05 NOTE — PROGRESS NOTES
"  Cardiovascular Surgery Progress Note  08/05/2022         Assessment and Plan:     Sofie Rordiguez is a 62 year old female with PMH significant for oxygen-dependent COPD, HF, HTN, HCV, osteopenia, and methamphetamine abuse in remission.    She is now s/p BSLT by Dr. Sunshine on 6/28/2022.  Her post-operative course has been complicated by LUE critical limb ischemia now s/p left radial thrombectomy on 7/1/2022, hypercapneic respiratory insufficiency, BACILIO, and dysphagia.      - Chest tube output past 24h from the right 200 ml, keep right pleural chest tube for drainage. No air leaks.    - IR placed left apical pigtail chest tube on 7/25; management per Pulmonology. Discussed with pulmonary, no drainage from left pigtail. Removed without complication 8/4.   - Per surgeon, lungs did not fill her chest space. She has stable apical pleural effusions.  - Daily wound care per nursing:  Wound cleanser to clamshell incision, pat dry, may be left open to air; keep incision C/D/I  - Remainder of plan per Pulm Transplant/Medicine teams    Discussed with Dr Rivas through both written and verbal communication.      Andres Wilson PA-C  Cardiothoracic Surgery  Pager [phone]    8:14 AM   August 5, 2022        Interval History:     No overnight events.  States pain is usually in abdomen with medication administration.   Surgical sites less painful.          Physical Exam:   Blood pressure 134/82, pulse 99, temperature 99  F (37.2  C), temperature source Oral, resp. rate 13, height 1.575 m (5' 2\"), weight 70 kg (154 lb 5.2 oz), SpO2 100 %, not currently breastfeeding.  Vitals:    07/27/22 0500 07/31/22 1639 08/02/22 0600   Weight: 75.1 kg (165 lb 9.1 oz) 73.5 kg (162 lb) 70 kg (154 lb 5.2 oz)      Gen: A&Ox4, NAD  Neuro: no focal deficits   CV: HD stable  Pulm: normal breathing on 1 lpm  Abd: nondistended, normal BS, soft, nontender  Incision: clean, dry, intact, no erythema, sternum stable  Tubes/drain sites: dressing clean " and dry, serosanguinous output, no air leak. Had 24 hr output 170 mL reported. Stripped tube, no additional output.         Data:    Imaging:  reviewed recent imaging, no acute concerns; stable Left apical/basilar effusion, stable right apical effusions.    Labs:  BMP  Recent Labs   Lab 08/05/22  0551 08/05/22  0430 08/05/22  0023 08/04/22  2001 08/04/22  1202 08/04/22  0902 08/03/22  0756 08/03/22  0633 08/02/22  0902 08/02/22  0757     --   --   --   --  140  --  140  --  141   POTASSIUM 4.5  --   --   --   --  4.9  --  5.1  --  4.6   CHLORIDE 97*  --   --   --   --  96*  --  94*  --  94*   ESTUARDO 9.0  --   --   --   --  9.4  --  9.2  --  9.0   CO2 41*  --   --   --   --  41*  --  45*  --  47*   BUN 58.6*  --   --   --   --  60.3*  --  73.5*  --  76.0*   CR 1.23*  --   --   --   --  1.20*  --  1.39*  --  1.40*   * 154* 164* 127*   < > 122*   < > 113*   < > 96    < > = values in this interval not displayed.     CBC  Recent Labs   Lab 08/05/22  0551 08/04/22  0902 08/03/22  0633 08/02/22  1311 08/02/22  0757   WBC 7.4 6.9 5.4  --  5.5   RBC 2.69* 2.77* 2.11*  --  2.45*   HGB 8.1* 8.4* 6.5* 7.4* 7.5*   HCT 26.9* 27.5* 22.2*  --  25.4*    99 105*  --  104*   MCH 30.1 30.3 30.8  --  30.6   MCHC 30.1* 30.5* 29.3*  --  29.5*   RDW 19.1* 19.9* 18.6*  --  18.5*    215 202  --  216

## 2022-08-05 NOTE — PROGRESS NOTES
Pulmonary Medicine  Cystic Fibrosis - Lung Transplant Team  Progress Note  2022       Patient: Sofie Rodriguez  MRN: [id number]  : 1960 (age 62 year old)  Transplant: 2022 (Lung), POD#38  Admission date: 2022    Assessment & Plan:     Sofie Rodriguez is a 62 year old female with a PMH significant for end-stage COPD, HTN, HFpEF, Mycobacterium peregrinum colonization, h/o hepatitis C, HECTOR s/p LEEP procedure, osteopenia, and former methamphetamine use.  Pt. is now s/p BSLT on 22, lungs slightly undersized.   Persistent low dose pressor needs post-op through 7/10.  Extubated POD #2 but with persistent hypercapnia, mostly compensated and only slightly improved with intermittent BiPAP.  Also with left radial artery thrombus (presumed secondary to arterial line) s/p thrombectomy , BACILIO, and C diff.  Rehabilitation and airway clearance initially limited by dysrhythmia and gastric discomfort, now with gradual improvement.  S/p GJ tube placement in IR .  EGD 8/3 for coffee ground G tube return, noted to have pyloric ulcer.      Today's recommendations:  - Aggressive airway clearance with CPT and nebs decreased to BID  - Wean supplemental O2 as able, exercise and overnight oximetry should be ordered prior to discharge  - Right chest tube managed by CVTS  - CXR today  - Repeat tacro level  ordered  - Prednisone taper due   - G tube to gravity drainage prn for GI symptoms, J tube for ALL medications (orders updated) and tube feeds, strict NPO (except ice chips and sips)     S/p bilateral sequential lung transplant (BSLT) for end stage COPD:  Persistent hypercapneic respiratory failure:   Persistent hypotension, Resolved:   Bilateral hydroPTX:  Right hemidiaphragm palsy: Explant pathology with severe emphysema with subpleural bullae formation, changes of chronic bronchitis, subpleural scars and patchy pulmonary edema; benign hilar lymph nodes.  Extubated .   Persistent hypercapnia without dyspnea, appears to be a respiratory drive problem. SNIFF (7/14) notable for right hemidiaphragm palsy.  Bronch (7/19) with slight graying of mucosa noted at right anastomosis and scant secretions (slightly increased in RLL).  Chest CT (7/23) with increased loculated fluid within the left hemithorax with specks of air density in the loculated fluid, similar appearing loculated right hydroPTX with minimal decrease in fluid component (right chest tube appears to be outside the loculated hydroPTX), increased size of pleural based lesion in MARLON, and mild subcutaneous emphysema along right chest tube with minimal along tract of removed left chest tube.  S/p left apical chest tube 7/25-8/4, right surgical tube also remains.  Bronch (8/2) with normal inspection of anastomoses.  NIPPV initially overnight for persistent hypercapnia, stopped given lack of improvement with therapy, compensated hypercapnia persists.  On 0.5-1L NC.   - Nebs: levalbuterol and Mucomyst decreased from TID to BID (ordered)  - Pulmonary toilet with chest physiotherapy also decreased from TID to BID (ordered)  - Aerobika and incentive spirometry hourly while awake  - Supplemental oxygen as needed to maintain SpO2 >92% (wean as able), exercise and overnight oximetry should be ordered prior to discharge  - would discontinued daily VBG   - Right chest tube managed by surgical team  - CXR today with stable small loculated pleural effusions (personally reviewed)  - Will need BLE US prior to discharge (screening for thrombus)     Immunosuppression:  Induction therapy with basiliximab (and high dose IV steroid).  - Tacrolimus 2 mg BID (via J tube).  Goal level 8-12.  Repeat level 8/6 (ordered).  -  mg BID (decreased 7/27, GI symptoms/leukopenia)  - Prednisone 12.5 mg BID, next taper due 8/18 (not yet ordered)  Date AM dose (mg) PM dose (mg)   8/4/22 12.5 10   8/18/22 10 10   9/15/22 10 7.5   10/13/22 7.5 7.5   11/10/22  7.5 5   12/8/22 5 5   1/5/23 5 2.5      Prophylaxis:   - Dapsone qMWF for PJP ppx (7/18)  - VGCV for CMV ppx, CMV negative 7/25  - Nystatin for oral candidiasis ppx, 6 month course  - See below for serologies and viral ppx:    Donor Recipient Intervention   CMV status Positive Positive Valganciclovir POD #8-90   EBV status Positive Positive EBV monitoring as below   HSV status N/A Positive Not indicated      ID:  Donor bronch cultures (OSH) with Strep beta hemolytic (not group A).    H/o M. peregrinum colonization: NGTD post-transplant.  - AFB to be sent on all future bronchs     Streptococcus pneumoniae:  Stenotrophomonas maltophilia: Noted in recipient cultures at time of transplant.  S/p ceftazidime 6/28-7/10, vancomycin 7/7-7/8 and 6/28-6/30, and levofloxacin 7/10-7/12 for total 2 week ABX course.     Hypogammaglobulinemia: IgG adequate at time of transplant, repeat level low (336) on 7/28.  S/p IVIG dosing with premedication on 7/30, tolerated well.  - Repeat IgG in one month (8/28)     H/o hepatitis C:  Diagnosed in 1980s s/p 2m treatment, quant negative since 10/2017, last positive 2/20/17 ([phone]).  Ab positive on 6/2021 with negative HCV PCR.  H/o remote EtOH abuse.  MR elastography (4/27/21) with hepatology review and consult without any concerns post transplant.  Hepatitis C RNA negative and hepatitis C antibody positive (old) on 6/28.     EBV viremia: EBV level 11k, log 4.1 on 7/28.  Not likely to be clinically significant.  - Monitor monthly (due 8/28)     Other relevant problems managed by primary team:      SVT:   Aflutter with RVR: SVT first noted on 7/14, prior to HD line placement.  Continues intermittently.  Aflutter with RVR to 200 7/17 triggered by activity.  EP consulted 7/17 given persistent tachycardia/dysrhythmia.  Midodrine held 7/19.  - AC and metoprolol per primary team     Left hand ischemia: Radial artery thrombosis identified on duplex doppler.  S/p thrombectomy on 7/2.   - AC per  primary      BACILIO:   Hyperkalemia: Likely multifactorial including medications (Bactrim, tacrolimus) and hypotension.  Fludrocortisone 7/6-7/11.  Potassium now stable.  S/p non-tunneled HD line 7/14.  Initial iHD run 7/14 limited by afib with RVR vs SVT.  Last iHD run 7/16, line removed 7/22.  Creatinine increased since 7/29 with diamox and elevated tacro level.   - Tacrolimus monitoring as above  - Management per nephrology and primary team     Severe gastroparesis:   Pyloric ulcer: Noted 7/15, cramping pain.  ACR with large stool burden in colon, no obstruction or distention.  Some nausea but no emesis.  CT abdomen (7/15) with moderate to large gastric distention, otherwise without obstruction.  NG placed for LIS with moderate to large output for several days.  Increased abdominal pain 7/17 after clamping for 4 hours, tolerated clamping today without issue.  Gastric emptying study (7/20) with severe gastric emptying delay (95% retention at 4 hours).  S/p GJ tube placement in IR 7/27.  - Simethicone prn  - TF via J tube, management per RD and primary team  - G tube to gravity drainage   - Strict NPO except for ice chips and sips     C diff infection: Abdominal pain with diarrhea noted 7/8, AXR without dilated bowel, moderate colonic stool burden.  C diff positive 7/11.  Loose stools (on tube feeds) stable.  S/p PO vancomycin (7/11-7/28).  - Low threshold to resume vancomycin in the future with ABX course     Hypomagnesemia: Suppressed absorption d/t CNI.   - Continue daily magnesium with replacement protocol prn, schedule oral magnesium supplementation if needed prior to discharge     We appreciate the excellent care provided by the [place] [id number] team.  Recommendations communicated via in person rounding and this note.  Will continue to follow along closely, please do not hesitate to call with any questions or concerns.    Patient discussed with Dr. Paredes.    Catherine Johnson, DNP, APRN, CNP  Inpatient Nurse  "Practitioner  Pulmonary CF/Transplant     Subjective & Interval History:     Feeling good, slept well last night.  On 1L NC, working to wean.  Occasional cough, rarely productive.  No nausea for the past 1-2 days, 2-3 loose stools yesterday, intermittent abdominal pain.    Review of Systems:     C: No fever, no chills, recent weight  INTEGUMENTARY/SKIN: No rash or obvious new lesions  ENT/MOUTH: No nasal congestion or drainage  RESP: See interval history  CV: No chest pain, no palpitations, no peripheral edema, no orthopnea  GI: See interval history  : No dysuria  MUSCULOSKELETAL: No myalgias, no arthralgias  ENDOCRINE: Blood sugars with adequate control  NEURO: No headache, no numbness or tingling  PSYCHIATRIC: Mood stable    Physical Exam:     All notes, images, and labs from past 24 hours (at minimum) were reviewed.    Vital signs:  Temp: 97.8  F (36.6  C) Temp src: Axillary BP: (!) 144/74 Pulse: 98   Resp: 16 SpO2: 100 % O2 Device: None (Room air) Oxygen Delivery: 1 LPM Height: 157.5 cm (5' 2\") Weight: 70 kg (154 lb 5.2 oz)  I/O:     Intake/Output Summary (Last 24 hours) at 8/5/2022 1106  Last data filed at 8/5/2022 0800  Gross per 24 hour   Intake 1405.64 ml   Output 180 ml   Net 1225.64 ml     Constitutional: Sitting up in a chair, daughter at bedside, in no apparent distress.   HEENT: Eyes with pink conjunctivae, anicteric.  Oral mucosa moist without lesions.   PULM: Mildly diminished bases bilaterally.  No crackles, no rhonchi, no wheezes.  Non-labored breathing on 1L NC.  CV: Normal S1 and S2.  RRR.  No murmur, gallop, or rub.  No peripheral edema.   ABD: NABS, soft, nontender, nondistended.  PEG/J tube site not visualized.  MSK: Moves all extremities.  No apparent muscle wasting.   NEURO: Alert and oriented, conversant.   SKIN: Warm, dry.  No rash on limited exam.   PSYCH: Mood stable.     Lines, Drains, and Devices:  Midline Catheter Double Lumen (Active)   Site Assessment WDL 08/05/22 0800   External " Cath Length (cm) 2 cm 08/04/22 1244   Initial Extremity Circumference (cm) 29 cm 07/28/22 1455   Dressing Intervention Chlorhexidine patch;Transparent 08/05/22 0800   Line Necessity Yes, meets criteria 08/05/22 0800   Dressing Change Due 08/11/22 08/04/22 1600   Purple - Status infusing 08/05/22 0800   Purple - Cap Change Due 08/03/22 08/02/22 0400   Red - Status saline locked 08/05/22 0800   Red - Cap Change Due 08/03/22 08/02/22 0400   Extravasation? No 08/05/22 0800   Number of days: 8     Data:     LABS    CMP:   Recent Labs   Lab 08/05/22  0844 08/05/22  0551 08/05/22  0430 08/05/22  0023 08/04/22  1202 08/04/22  0902 08/03/22  0756 08/03/22  0633 08/02/22  0902 08/02/22  0757 08/01/22  1218 08/01/22  0832   NA  --  141  --   --   --  140  --  140  --  141  --  140   POTASSIUM  --  4.5  --   --   --  4.9  --  5.1  --  4.6  --  4.2   CHLORIDE  --  97*  --   --   --  96*  --  94*  --  94*  --  91*   CO2  --  41*  --   --   --  41*  --  45*  --  47*  --  47*   ANIONGAP  --  3*  --   --   --  3*  --  1*  --  <1*  --  2*   * 142* 154* 164*   < > 122*   < > 113*   < > 96   < > 144*   BUN  --  58.6*  --   --   --  60.3*  --  73.5*  --  76.0*  --  80.2*   CR  --  1.23*  --   --   --  1.20*  --  1.39*  --  1.40*  --  1.67*   GFRESTIMATED  --  50*  --   --   --  52*  --  43*  --  43*  --  35*   ESTUARDO  --  9.0  --   --   --  9.4  --  9.2  --  9.0  --  9.0   MAG  --  2.3  --   --   --  2.4*  --  2.4*  --  2.6*  --  2.7*   PHOS  --  3.8  --   --   --  3.1  --  3.7  --  4.1  --  4.7*   PROTTOTAL  --   --   --   --   --  5.4*  --   --   --   --   --  5.3*   ALBUMIN  --   --   --   --   --  2.8*  --   --   --   --   --  2.8*   BILITOTAL  --   --   --   --   --  0.2  --   --   --   --   --  0.2   ALKPHOS  --   --   --   --   --  60  --   --   --   --   --  68   AST  --   --   --   --   --  22  --   --   --   --   --  22   ALT  --   --   --   --   --  12  --   --   --   --   --  12    < > = values in this interval not  displayed.     CBC:   Recent Labs   Lab 08/05/22  0551 08/04/22  0902 08/03/22  0633 08/02/22  1311 08/02/22  0757   WBC 7.4 6.9 5.4  --  5.5   RBC 2.69* 2.77* 2.11*  --  2.45*   HGB 8.1* 8.4* 6.5* 7.4* 7.5*   HCT 26.9* 27.5* 22.2*  --  25.4*    99 105*  --  104*   MCH 30.1 30.3 30.8  --  30.6   MCHC 30.1* 30.5* 29.3*  --  29.5*   RDW 19.1* 19.9* 18.6*  --  18.5*    215 202  --  216       INR:   Recent Labs   Lab 08/03/22  0633   INR 0.87       Glucose:   Recent Labs   Lab 08/05/22  0844 08/05/22  0551 08/05/22  0430 08/05/22  0023 08/04/22  2001 08/04/22  1626   * 142* 154* 164* 127* 160*       Blood Gas:   Recent Labs   Lab 08/04/22  0902 08/03/22  0633 08/02/22  0757   PHV 7.42 7.39 7.36   PCO2V 71* 78* 81*   PO2V 31 29 38   HCO3V 46* 47* 46*   LINDY 18.7* 20.4* 17.4*   O2PER 2 35 35       Culture Data No results for input(s): CULT in the last 168 hours.    Virology Data:   Lab Results   Component Value Date    FLUAH1 Not Detected 06/29/2022    FLUAH3 Not Detected 06/29/2022    SM5944 Not Detected 06/29/2022    IFLUB Not Detected 06/29/2022    RSVA Not Detected 06/29/2022    RSVB Not Detected 06/29/2022    PIV1 Not Detected 06/29/2022    PIV2 Not Detected 06/29/2022    PIV3 Not Detected 06/29/2022    HMPV Not Detected 06/29/2022       Historical CMV results (last 3 of prior testing):  Lab Results   Component Value Date    CMVQNT Not Detected 07/25/2022     No results found for: CMVLOG    Urine Studies    Recent Labs   Lab Test 08/01/22  0319 07/08/22  0831 07/05/22  1004   URINEPH 8.0* 5.5 5.5   NITRITE Negative Negative Negative   LEUKEST Negative Negative Negative   WBCU  --  1 5       Most Recent [facility] Pulmonary Function Testing (FVC/FEV1 only)  FVC-Pre   Date Value Ref Range Status   04/29/2022 1.82 L    11/11/2021 2.17 L    06/14/2021 2.00 L      FVC-%Pred-Pre   Date Value Ref Range Status   04/29/2022 58 %    11/11/2021 70 %    06/14/2021 64 %      FEV1-Pre   Date Value Ref Range Status    04/29/2022 0.51 L    11/11/2021 0.53 L    06/14/2021 0.54 L      FEV1-%Pred-Pre   Date Value Ref Range Status   04/29/2022 20 %    11/11/2021 21 %    06/14/2021 21 %        IMAGING    Recent Results (from the past 48 hour(s))   XR Chest 2 Views    Narrative    Chest 2 views    INDICATION: Interval follow-up lung transplant, bilateral chest tubes    COMPARISON: Yesterday    Findings: Clamshell sternotomy from prior bilateral lung transplant  again noted. Left pleural effusion with possible loculation component  unchanged. Atelectasis or mild edema in the right upper lung zones  with another atelectasis in the left upper lung unchanged. Left chest  catheter again present. Right basilar thoracostomy tube again present.  Loculated right hydropneumothorax unchanged.      Impression    IMPRESSION: No significant interval change with underlying bilateral  lung transplant.    ALVINA HARRY MD         SYSTEM ID:  [id number]   XR Chest 2 Views    Narrative    Exam:  Chest 2 view    History: 62-year-old female with hx of end stage COPD, status post  bilateral lung transplant June 2022, heart failure, hypertension,  history of hepatitis C, former methamphetamine use.    Indication: Interval follow up lung transplant, bilateral chest tubes  are    Comparison: Chest x-ray 8/3/2022.    Findings: PA and lateral views of the chest. The trachea is midline.  Left approach midline catheter PICC terminates in the high left  axilla. Postoperative changes of bilateral lung transplant with  surgical clips, and sternotomy wires from clamshell sternotomy. Stable  position of left chest tube with tip projecting in the superior  portion of the left hemithorax. Stable position of right chest tube  with tip and sidehole projecting in the right lower hemithorax.  Redemonstration of left pleural effusion with evidence of loculation.  Unchanged loculated right hydropneumothorax. Patchy atelectasis of the  left upper lobe. The visualized bones  and upper abdomen are  unremarkable      Impression    Impression:  1. No significant interval change of bilateral lung transplants. With  stable positioning of bilateral chest tubes and redemonstration of  bilateral loculated pleural effusions.  2. Continued left upper lobe patchy atelectasis not significantly  changed from prior.  3. Left axillary PICC terminates in the high left axilla.     I have personally reviewed the examination and initial interpretation  and I agree with the findings.    JOHANN MORAES MD         SYSTEM ID:  [id number]   XR Chest Port 1 View    Narrative    EXAM: XR Chest 1 view 8/4/2022 6:45 PM      HISTORY: CT removal.    COMPARISON: Same day radiograph of the chest at 933.     TECHNIQUE: Frontal view of the chest.    FINDINGS: Left axillary CVC. Right basilar chest tube in stable  position. Interval removal of left apical chest tube. Trachea is  midline. Cardiac mediastinal silhouette is stable. Stable left upper  lobe patchy atelectasis. Stable moderate loculated left pleural  effusion. No definite left pneumothorax. Stable right loculated small  hydropneumothorax.      Impression    IMPRESSION:   1. Interval removal of left apical chest tube.  2. Stable moderate left loculated pleural effusion.  3. Simple right loculated small hydropneumothorax.  4. Stable left upper lobe patchy consolidation/atelectasis. Better  evaluated on CT of 7/28/2022    I have personally reviewed the examination and initial interpretation  and I agree with the findings.    GABRIELLA SNELL MD         SYSTEM ID:  [id number]

## 2022-08-05 NOTE — TELEPHONE ENCOUNTER
A pharmacist spent 45 minutes providing medication teaching with Sofie Rodriguez and daughter Julia. The discharge medication list was reviewed with the patient/family and the following points were discussed, as applicable: Name, description, purpose, dose/strength, duration of medications, common side effects, food/medications to avoid, action to be taken if dose is missed and how to obtain refills.  Sofie's daughters Julia and Charity will be responsible for managing medications. They plan to rotate primary caregiver responsibilities. Additionally, the following transplant related education was covered: Purpose of medication card, Timing of medications and day of lab draw considerations , Emesis or missed dose, Prescription Insurance  and Discharge process for receiving meds   Patient will  transplant supplies including 7 day pill organizer, thermometer, and BP monitor at the discharge pharmacy along with medications.  Patient chooses to receive medications from [facility].    Patient will be staying locally at [facility] until medically cleared to return to [place]. Discussed with Julia, since some of the liquid medications will have to come from [facility]. We thought it would be good to have [facility] fill all [place] monthly meds while local  (vs. [facility] pharmacy ) so they have practice with this process. That way, when they return to [place] they are familiar with the mail order process.    Clinical Pharmacy Consult:                                                      Transplant Specific:   Date of Transplant: 06/28/2022  Type of Transplant: lung  First Transplant: yes  History of rejection: no    Immunosuppression Regimen   TAC 3 mg qAM & 3 mg qPM, Prednisone 12.5 mg qAM & 10 mg qPM and  mg qAM & 500 mg qPM  Patient specific goal: Tac 8 to 12  Most recent level: 3.1, date 08/03  Immunosuppressant Levels:  Subtherapeutic  Pt adherent to lab draws: yes  Scr:   Lab Results    Component Value Date    CR 1.23 08/05/2022    CR 0.85 06/30/2021     Side effects: no side effects    Prophylactic Medications  Antibacterial:  Dapsone 50 mg three times weekly  Scheduled Discontinue Date: Lifelong    Antifungal: Nystatin 10 mL four times daily  Scheduled Discontinue Date: 6 months    Antiviral: CrCl 40 to 59 mL/minute: Valcyte 450 mg once daily   Scheduled Discontinue Date: 3 months; POD #8-90     Acid Reducer: Protonix (pantoprazole) 40 mg twice daily  Scheduled Reviewed Date: review in clinic    Thrombosis Prevention: on hold - Aspirin 81 mg once daily - GI bleed  Scheduled Discontinue Date: tbd    Blood Pressure Management  Frequency of home Blood Pressure checks: once daily  Most recent home BP: 129/78  Patient Blood pressure goal:   Patient blood pressure at goal:    Hospitalizations/ER visits since last assessment: 0      Med rec/DUR performed: yes  Med Rec Discrepancies: no    Reminders:    1. Bring to first clinic appt: med box, med card, bp monitor, all medications being taken, and lab book.  2.   MTM pharmacist visit on first clinic appt and if ok, again in 3 to 4 months during follow up appt.  3.   Avoid Grapefruit and Grapefruit juice.   4.   Avoid herbal supplements. If wish to take other medications or supplements, call your coordinator.   5.   Keep lab appts.   6.   Can use apps on phone like [email or web address] to help manage medication lists and reminders.   7.   Make sure you are protecting your skin by wearing long sleeves and applying sunscreen to exposed skin, for any significant time in the sun.     Transplant Coordinator is Girma Doty, Pharm.D.  [facility]  [phone]

## 2022-08-05 NOTE — PROGRESS NOTES
Care Management Follow Up    Length of Stay (days): 37    Expected Discharge Date: 08/08/2022     Concerns to be Addressed:       Patient plan of care discussed at interdisciplinary rounds: Yes    Anticipated Discharge Disposition: Other (Comments)     Anticipated Discharge Services:    Anticipated Discharge DME:      Patient/family educated on Medicare website which has current facility and service quality ratings:    Education Provided on the Discharge Plan:    Patient/Family in Agreement with the Plan:      Referrals Placed by CM/SW:  HC, [facility]  Private pay costs discussed: Not applicable    Additional Information:  RNCC was contacted about need to set up HC, RN, pt likely needs HC PT/OT as well, using walker, will be homebound at [facility] after hospital stay. RNCC reached out to [facility] for HC, who has [facility] for HC RN, will add PT/OT RN confirmed with [facility]. Patient/family also needing education on nebulizer machine cleaning, Tomas agreeable to perform this, asked that it be added to AVS/order which RNCC has done. RNCC spoke with patient's daughter Julia as well regarding these plans and family is agreeable to HC. Family getting TF, transplant, DM education today. RNCC will continue to follow.    [facility] [phone], Fax [phone]  HC PT/OT/RN    [facility]  Phone # [phone]  Fax # [phone]   TF    MISAEL VanN, BA, RN, CMSRN, RNCC  Covering Units 6D/OBS  Pager: [phone]  Phone: [phone]  6th floor Weekend/Holiday Pager: [phone]  Observation weekend/after hours: [phone]

## 2022-08-05 NOTE — DISCHARGE INSTRUCTIONS
"  Lung Transplant Discharge Information      Reminder for Clinic Visits:  Most times that you come into the lung transplant clinic, we will check your tacrolimus (Prograf) level.     Please remember:            1) Do NOT take your tacrolimus (Prograf) pills before your blood is drawn.  Ideally, we draw your blood about 12 hours after your last evening dose, which is drawn just before you take your next morning dose.            2) Bring your pills with you to your clinic visit.  Once your blood has been drawn, you should take your pills (bring a small snack with you if you need one)    EXAMPLE: Your clinic appointment is at 9:15 am.  You should have your blood drawn before your appointment, so that these results will be available to the provider.  Do NOT take your tacrolimus (Prograf) before going to the clinic lab to have your blood drawn around 8:00 am.  If your lab appointment is at 8:00 am, you would have taken your dose the evening at 8:00 pm the day before.  Please let the lab know what time you took your tacrolimus (Prograf) pills the night before.  After your lab draw, you will have time to take your pills, eat a snack, go to your chest x-ray or PFTs (if ordered) and be on time for your 9:15 am appointment.       Post-Lung Transplant Instructions:  Gradually continue to increase your activity each day.  You will have \"good days and bad days\", but overall you should be feeling better and more energetic from week to week. You should be walking to the restroom, showering each day, and making yourself a snack or light meal.  Walk every day, starting with a few minutes at a time and gradually increasing the time.  Remember, your caregiver is there to help you, not to do everything for you.   Monitor and record weight, temperature, pulse and blood pressure each day.  Record these numbers in your transplant book.  Bring your medication and Prednisone taper cards and transplant lab book with you to each clinic " visit.  Check your blood sugars before each meal and before bed.  Follow the insulin instructions provided to you.   No driving for at least one month after surgery - please consult with your lung transplant team about timing of readiness to begin driving.  You must also be off of narcotics before you can drive.   No lifting more than 10 pounds for 6 weeks after surgery (a gallon of milk weighs about 10 pounds).  You may shower 48 hours after your last chest tube was removed.  Leave incisions open to air (no dressings).  Wash your incisions gently with soap and water daily, pat dry.   Do not take a bath or soak your incisions.  No pools or hot tubs until cleared by your transplant provider.  Wear a mask over your mouth and nose any time you are in a crowd, for example in a mall or movie theater.   Be careful about handwashing.  Wash your own hands with soap and water or hand gel frequently, and encourage those around you to do the same.   Protect yourself from the sun.  Any time you are outside, wear sunscreen (SPF 50 or above) and a hat as well as long sleeves/pants when able.  Limit your time in the sunlight as much as possible.  Absolutely NO additional medications (over the counter, herbal, etc) without discussing directly with your provider.   Do not take any NSAID medications [examples: ibuprofen (Advil/Motrin), naproxen (Aleve)] as these medicines interact with your transplant medicines and may harm your kidneys.  Take all of your medications just as we have prescribed them.  If you are unable to take your medications (due to not feeling well, financial reasons, or any other reason), call your transplant coordinator right away.   If you do not live locally, you will be asked to remain living in a local apartment or hotel for 3 months after surgery.  Plan to stay this entire 3 months, and we may extend this longer than 3 months.  However, this will be determined based on your recovery and in discussion with  "your transplant provider.      Notify your Transplant Coordinator if you experience any of the following:  Oral Temperature greater then 100.5  Drainage from any incision that soaks more than 2 gauze pads in a day, or drainage that is thick and yellow/green/brown/tan/bloody  Redness and/or swelling around your incision  An area of your incision that seems to be opening, whether there is drainage or not  Pain that is getting worse instead of better  Clicking, popping, \"thumping\" feeling in your chest  Chills or night sweats  Chest pain  Increase in sputum (phlegm) amount, or change in color  Blood in your sputum (phlegm)  Increased shortness of breath or coughing  Swelling or pain in your arms or legs  Nausea and/or vomiting  Diarrhea or constipation  Change in the amount of exercise that you can tolerate      Your Pain Medication Plan:  On your day of discharge, you are taking Oxycodone 5-10mg every 4 hours as needed for pain management.  We expect you to need (and take!) your pain medication when you get home.  This is important, because you need to be able to do your breathing exercises, exercise, and help to care for yourself once you get home.  When you feel that you are ready to wean down your pain medication, here is what we recommend:    -->Start by reducing your dose of oxycodone first.  On your day of discharge, you are taking 5-10 mg with each dose (four times a day).  Try reducing the dose to 5 mg each time you take it (four times a day).  Once you are able to do that, then try 5 mg three times a day, then twice a day, then once a day.  Once you have stopped oxycodone you can start to cut back on your tylenol in a similar way.         Activity:  Activity as tolerated.  Outpatient pulmonary rehab to start within one week of discharge.      Nutrition/Diet:  Regular diet as tolerated, or as recommended by your team/doctors    *Post-Transplant Diet Guidelines - Follow recommendations on the Guide to Your Diet " after Transplant handout (summary below).    Maintain a healthy weight  Eat a heart-healthy diet (low sodium, low saturated and trans-fat) once your appetite improves to normal  Control blood sugar  Limit sodium (salt)  Take calcium and vitamin D supplement if your doctor or team orders  Eat more protein for six to eight weeks after transplant    Take measures to prevent food poisoning: store and prepare foods to the proper temperature, practice good handwashing, heat all deli meat (to 165 degrees Fahrenheit), avoid raw fish and meats (including uncooked eggs, non-pasteurized or raw milk, and non-pasteurized or raw cheeses [including brie, camembert, blue-veined cheese, and Mexican queso fresco]), avoid shellfish/seafood for three weeks after transplant, and throw out leftovers older than two days.   In some cases (but not in all cases), adjust potassium intake   Check your blood sugar before each meal and before bedtime.  Follow insulin instructions provided to you.          Prednisone (Steroid) Taper:    Date AM dose (mg) PM dose (mg)   8/4/22 12.5 10   8/18/22 10 10   9/15/22 10 7.5   10/13/22 7.5 7.5   11/10/22 7.5 5   12/8/22 5 5   1/5/23 5 2.5         Upcoming Appointments and Plans:  (Please see your full discharge instructions for appointment dates and times)  After hospital discharge, you will go to a local apartment/hotel.  Once you leave the hospital, here is a general idea of what your next 6 weeks will look like:  Transplant clinic appointments twice a week for two weeks, then once a week for two weeks, then to be determined after that  Blood draws with most clinic appointments, and chest x-rays and PFTs (pulmonary function tests) with some clinic appointments.  Outpatient pulmonary rehab initial intake visit, followed by appointments twice a week for pulmonary rehab      Your transplant coordinator:   Girma MEDRANO     Notify your Transplant Coordinator  at [phone] Monday through Friday with questions  or any new symptoms. For assistance after 5pm weekdays or during weekends and holidays, call the hospital at [phone] and  ask the hospital  to page the Lung Transplant Coordinator On Call. Someone is available to you 24 hours a day, 7 days a week! Do not hesitate to call us.      Important Phone Numbers:  Lung Transplant Office (Coordinator Main Line): [phone]  [facility]: [phone]  After-hours/weekends/holidays: [phone] (ask  to page Lung Transplant Coordinator On Call)      Lung Transplant Clinic ([facility]) Location:  [facility]  Pulmonary is on the 3rd floor  [address] [address] [phone]  [place] [address]   -------------------------------------------------------------------------------------------------------------------------------------------------------------------------    STERNOTOMY PRECAUTIONS  You had a sternotomy, avoid lifting anything greater than ten pounds for 6 weeks after surgery and then less than 20 pounds for an additional 6 weeks. Do not reach backwards or use arms to push out of chair. Do not let people pull on your arms to assist with standing. Avoid twisting or reaching too far across your body.  Avoid strenuous activities such as bowling, vacuuming, raking, shoveling, golf or tennis for 12 weeks after your surgery. It is okay to resume sex if you feel comfortable in doing so. You may have to try different positions with your partner.  Splint your chest incision by hugging a pillow or bringing your arms across your chest when coughing or sneezing.     No driving for 4 weeks after surgery or while on pain medication.    Shower or wash your incisions daily with soap and water (or as instructed), pat dry. Keep wound clean and dry, showers are okay after discharge, but don't let spray hit directly on incision. No baths or swimming for 1 month. Cover chest tube sites with gauze until  they stop draining, then leave open to air. It is not abnormal for chest tube sites to drain yellowish/clear fluid for up to 2-3 weeks after surgery.   Watch for signs of infection: increased redness, tenderness, warmth or any drainage that appears infected (pus like) or is persistent.  Also a temperature > 100.5 F or chills. Call your surgeon or primary care provider's office immediately. Remove any skin glue left on incisions after 10-14 days. This will not affect your incision and can speed up healing.

## 2022-08-05 NOTE — CONSULTS
Discharge Pharmacy Test Claim    Patient has prescription coverage through Medicare Part B and [employer] Medicare Part D. Patient's Part B plan will cover any brand glucometer provided that patient meets diabetic criteria. Patient's [employer] Part D plan covers basaglar, novolin N, and novolog pens with $4 copays.    Test Claim Copay Note   basaglar kwikpens 4.00    glucometer supplies 0.00 any brand covered   lantus solostar pens not covered basaglar preferred   novolin N flexpens 4.00    novolog flexpens 4.00          Claribel Sauer  [facility] Liaison  Ph: [phone] Pager: [phone]

## 2022-08-05 NOTE — PLAN OF CARE
Pt A&Ox4, calls appropriately, no falls. Pt up to commode x1 overnight to void and have BM x1. Right Chest tube remain in place, no output overnight. Pt on 1L NC, PRN oxy and scheduled tylenol given per patient request. Pt is tolerating TF overnight, but remains NPO. G tube to gravity drainage with minimal output. Pt denies nausea overnight. Will continue to monitor.    Problem: Plan of Care - These are the overarching goals to be used throughout the patient stay.    Goal: Plan of Care Review/Shift Note  Description: The Plan of Care Review/Shift note should be completed every shift.  The Outcome Evaluation is a brief statement about your assessment that the patient is improving, declining, or no change.  This information will be displayed automatically on your shift note.  Outcome: Ongoing, Progressing  Flowsheets (Taken 8/4/2022 9102)  Plan of Care Reviewed With: patient  Outcome Evaluation: Pt with decreased pain, no nausea overnight. Pt remains NPO.  Overall Patient Progress: improving   Goal Outcome Evaluation:    Plan of Care Reviewed With: patient     Overall Patient Progress: improving    Outcome Evaluation: Pt with decreased pain, no nausea overnight. Pt remains NPO.

## 2022-08-06 ENCOUNTER — APPOINTMENT (OUTPATIENT)
Dept: PHYSICAL THERAPY | Facility: CLINIC | Age: 62
DRG: 007 | End: 2022-08-06
Attending: THORACIC SURGERY (CARDIOTHORACIC VASCULAR SURGERY)
Payer: MEDICARE

## 2022-08-06 ENCOUNTER — APPOINTMENT (OUTPATIENT)
Dept: GENERAL RADIOLOGY | Facility: CLINIC | Age: 62
DRG: 007 | End: 2022-08-06
Attending: PHYSICIAN ASSISTANT
Payer: MEDICARE

## 2022-08-06 LAB
ANION GAP SERPL CALCULATED.3IONS-SCNC: 4 MMOL/L (ref 7–15)
BASOPHILS # BLD AUTO: 0 10E3/UL (ref 0–0.2)
BASOPHILS NFR BLD AUTO: 0 %
BUN SERPL-MCNC: 56.1 MG/DL (ref 8–23)
C DIFF TOX B STL QL: NEGATIVE
CALCIUM SERPL-MCNC: 9 MG/DL (ref 8.8–10.2)
CHLORIDE SERPL-SCNC: 95 MMOL/L (ref 98–107)
CREAT SERPL-MCNC: 1.21 MG/DL (ref 0.51–0.95)
DEPRECATED HCO3 PLAS-SCNC: 41 MMOL/L (ref 22–29)
EOSINOPHIL # BLD AUTO: 0.1 10E3/UL (ref 0–0.7)
EOSINOPHIL NFR BLD AUTO: 1 %
ERYTHROCYTE [DISTWIDTH] IN BLOOD BY AUTOMATED COUNT: 18.3 % (ref 10–15)
GFR SERPL CREATININE-BSD FRML MDRD: 51 ML/MIN/1.73M2
GLUCOSE BLDC GLUCOMTR-MCNC: 135 MG/DL (ref 70–99)
GLUCOSE BLDC GLUCOMTR-MCNC: 147 MG/DL (ref 70–99)
GLUCOSE BLDC GLUCOMTR-MCNC: 151 MG/DL (ref 70–99)
GLUCOSE BLDC GLUCOMTR-MCNC: 158 MG/DL (ref 70–99)
GLUCOSE BLDC GLUCOMTR-MCNC: 160 MG/DL (ref 70–99)
GLUCOSE BLDC GLUCOMTR-MCNC: 167 MG/DL (ref 70–99)
GLUCOSE SERPL-MCNC: 143 MG/DL (ref 70–99)
HCT VFR BLD AUTO: 26.3 % (ref 35–47)
HGB BLD-MCNC: 7.8 G/DL (ref 11.7–15.7)
IMM GRANULOCYTES # BLD: 0.1 10E3/UL
IMM GRANULOCYTES NFR BLD: 2 %
LYMPHOCYTES # BLD AUTO: 0.2 10E3/UL (ref 0.8–5.3)
LYMPHOCYTES NFR BLD AUTO: 2 %
MAGNESIUM SERPL-MCNC: 2.6 MG/DL (ref 1.7–2.3)
MCH RBC QN AUTO: 30 PG (ref 26.5–33)
MCHC RBC AUTO-ENTMCNC: 29.7 G/DL (ref 31.5–36.5)
MCV RBC AUTO: 101 FL (ref 78–100)
MONOCYTES # BLD AUTO: 0.5 10E3/UL (ref 0–1.3)
MONOCYTES NFR BLD AUTO: 6 %
NEUTROPHILS # BLD AUTO: 7.3 10E3/UL (ref 1.6–8.3)
NEUTROPHILS NFR BLD AUTO: 89 %
NRBC # BLD AUTO: 0 10E3/UL
NRBC BLD AUTO-RTO: 0 /100
PHOSPHATE SERPL-MCNC: 3.9 MG/DL (ref 2.5–4.5)
PLATELET # BLD AUTO: 223 10E3/UL (ref 150–450)
POTASSIUM SERPL-SCNC: 4.3 MMOL/L (ref 3.4–5.3)
RBC # BLD AUTO: 2.6 10E6/UL (ref 3.8–5.2)
SODIUM SERPL-SCNC: 140 MMOL/L (ref 136–145)
TACROLIMUS BLD-MCNC: 4.8 UG/L (ref 5–15)
TME LAST DOSE: ABNORMAL H
TME LAST DOSE: ABNORMAL H
UFH PPP CHRO-ACNC: 0.37 IU/ML
WBC # BLD AUTO: 8.2 10E3/UL (ref 4–11)

## 2022-08-06 PROCEDURE — 87493 C DIFF AMPLIFIED PROBE: CPT | Performed by: INTERNAL MEDICINE

## 2022-08-06 PROCEDURE — 82310 ASSAY OF CALCIUM: CPT | Performed by: STUDENT IN AN ORGANIZED HEALTH CARE EDUCATION/TRAINING PROGRAM

## 2022-08-06 PROCEDURE — 85025 COMPLETE CBC W/AUTO DIFF WBC: CPT | Performed by: STUDENT IN AN ORGANIZED HEALTH CARE EDUCATION/TRAINING PROGRAM

## 2022-08-06 PROCEDURE — 71046 X-RAY EXAM CHEST 2 VIEWS: CPT

## 2022-08-06 PROCEDURE — 250N000012 HC RX MED GY IP 250 OP 636 PS 637: Performed by: INTERNAL MEDICINE

## 2022-08-06 PROCEDURE — 36592 COLLECT BLOOD FROM PICC: CPT | Performed by: STUDENT IN AN ORGANIZED HEALTH CARE EDUCATION/TRAINING PROGRAM

## 2022-08-06 PROCEDURE — 97530 THERAPEUTIC ACTIVITIES: CPT | Mod: GP | Performed by: PHYSICAL THERAPIST

## 2022-08-06 PROCEDURE — 83735 ASSAY OF MAGNESIUM: CPT | Performed by: STUDENT IN AN ORGANIZED HEALTH CARE EDUCATION/TRAINING PROGRAM

## 2022-08-06 PROCEDURE — 80197 ASSAY OF TACROLIMUS: CPT | Performed by: NURSE PRACTITIONER

## 2022-08-06 PROCEDURE — 120N000002 HC R&B MED SURG/OB UMMC

## 2022-08-06 PROCEDURE — 250N000013 HC RX MED GY IP 250 OP 250 PS 637: Performed by: SURGERY

## 2022-08-06 PROCEDURE — 94640 AIRWAY INHALATION TREATMENT: CPT

## 2022-08-06 PROCEDURE — 250N000013 HC RX MED GY IP 250 OP 250 PS 637: Performed by: NURSE PRACTITIONER

## 2022-08-06 PROCEDURE — 250N000013 HC RX MED GY IP 250 OP 250 PS 637: Performed by: HOSPITALIST

## 2022-08-06 PROCEDURE — 99233 SBSQ HOSP IP/OBS HIGH 50: CPT | Mod: 24 | Performed by: INTERNAL MEDICINE

## 2022-08-06 PROCEDURE — 87338 HPYLORI STOOL AG IA: CPT | Performed by: INTERNAL MEDICINE

## 2022-08-06 PROCEDURE — 99233 SBSQ HOSP IP/OBS HIGH 50: CPT | Performed by: INTERNAL MEDICINE

## 2022-08-06 PROCEDURE — 97116 GAIT TRAINING THERAPY: CPT | Mod: GP | Performed by: PHYSICAL THERAPIST

## 2022-08-06 PROCEDURE — 71046 X-RAY EXAM CHEST 2 VIEWS: CPT | Mod: 26 | Performed by: RADIOLOGY

## 2022-08-06 PROCEDURE — 85520 HEPARIN ASSAY: CPT | Performed by: INTERNAL MEDICINE

## 2022-08-06 PROCEDURE — 250N000013 HC RX MED GY IP 250 OP 250 PS 637: Performed by: INTERNAL MEDICINE

## 2022-08-06 PROCEDURE — 258N000003 HC RX IP 258 OP 636: Performed by: INTERNAL MEDICINE

## 2022-08-06 PROCEDURE — 84100 ASSAY OF PHOSPHORUS: CPT | Performed by: STUDENT IN AN ORGANIZED HEALTH CARE EDUCATION/TRAINING PROGRAM

## 2022-08-06 PROCEDURE — 250N000009 HC RX 250: Performed by: NURSE PRACTITIONER

## 2022-08-06 PROCEDURE — 250N000013 HC RX MED GY IP 250 OP 250 PS 637: Performed by: STUDENT IN AN ORGANIZED HEALTH CARE EDUCATION/TRAINING PROGRAM

## 2022-08-06 PROCEDURE — 94668 MNPJ CHEST WALL SBSQ: CPT

## 2022-08-06 PROCEDURE — 94640 AIRWAY INHALATION TREATMENT: CPT | Mod: 76

## 2022-08-06 PROCEDURE — 250N000011 HC RX IP 250 OP 636: Performed by: HOSPITALIST

## 2022-08-06 PROCEDURE — 250N000012 HC RX MED GY IP 250 OP 636 PS 637: Performed by: NURSE PRACTITIONER

## 2022-08-06 RX ADMIN — MYCOPHENOLATE MOFETIL 500 MG: 200 POWDER, FOR SUSPENSION ORAL at 08:15

## 2022-08-06 RX ADMIN — INSULIN ASPART 1 UNITS: 100 INJECTION, SOLUTION INTRAVENOUS; SUBCUTANEOUS at 03:42

## 2022-08-06 RX ADMIN — NYSTATIN 1000000 UNITS: 100000 SUSPENSION ORAL at 08:16

## 2022-08-06 RX ADMIN — Medication 40 MG: at 21:05

## 2022-08-06 RX ADMIN — INSULIN ASPART 1 UNITS: 100 INJECTION, SOLUTION INTRAVENOUS; SUBCUTANEOUS at 00:35

## 2022-08-06 RX ADMIN — ONDANSETRON 4 MG: 4 TABLET, ORALLY DISINTEGRATING ORAL at 08:16

## 2022-08-06 RX ADMIN — SODIUM CHLORIDE 500 ML: 9 INJECTION, SOLUTION INTRAVENOUS at 10:06

## 2022-08-06 RX ADMIN — ACETYLCYSTEINE 2 ML: 200 INHALANT RESPIRATORY (INHALATION) at 20:31

## 2022-08-06 RX ADMIN — LEVALBUTEROL HYDROCHLORIDE 1.25 MG: 1.25 SOLUTION RESPIRATORY (INHALATION) at 08:58

## 2022-08-06 RX ADMIN — NYSTATIN: 100000 CREAM TOPICAL at 20:01

## 2022-08-06 RX ADMIN — TACROLIMUS 3 MG: 5 CAPSULE ORAL at 08:17

## 2022-08-06 RX ADMIN — INSULIN ASPART 1 UNITS: 100 INJECTION, SOLUTION INTRAVENOUS; SUBCUTANEOUS at 20:02

## 2022-08-06 RX ADMIN — NYSTATIN 1000000 UNITS: 100000 SUSPENSION ORAL at 17:22

## 2022-08-06 RX ADMIN — INSULIN ASPART 2 UNITS: 100 INJECTION, SOLUTION INTRAVENOUS; SUBCUTANEOUS at 15:06

## 2022-08-06 RX ADMIN — CALCIUM CARBONATE 600 MG (1,500 MG)-VITAMIN D3 400 UNIT TABLET 1 TABLET: at 17:22

## 2022-08-06 RX ADMIN — Medication 40 MG: at 20:01

## 2022-08-06 RX ADMIN — PREDNISONE 12.5 MG: 2.5 TABLET ORAL at 08:17

## 2022-08-06 RX ADMIN — NYSTATIN: 100000 CREAM TOPICAL at 08:15

## 2022-08-06 RX ADMIN — METOPROLOL TARTRATE 25 MG: 25 TABLET, FILM COATED ORAL at 20:01

## 2022-08-06 RX ADMIN — THERA TABS 1 TABLET: TAB at 12:05

## 2022-08-06 RX ADMIN — MYCOPHENOLATE MOFETIL 500 MG: 200 POWDER, FOR SUSPENSION ORAL at 20:01

## 2022-08-06 RX ADMIN — CALCIUM CARBONATE 600 MG (1,500 MG)-VITAMIN D3 400 UNIT TABLET 1 TABLET: at 10:06

## 2022-08-06 RX ADMIN — VALGANCICLOVIR HYDROCHLORIDE 450 MG: 50 POWDER, FOR SOLUTION ORAL at 12:05

## 2022-08-06 RX ADMIN — NYSTATIN 1000000 UNITS: 100000 SUSPENSION ORAL at 15:06

## 2022-08-06 RX ADMIN — Medication 1 PACKET: at 12:08

## 2022-08-06 RX ADMIN — ACETAMINOPHEN 975 MG: 325 TABLET, FILM COATED ORAL at 20:01

## 2022-08-06 RX ADMIN — TACROLIMUS 4 MG: 5 CAPSULE ORAL at 17:22

## 2022-08-06 RX ADMIN — ASPIRIN 81 MG CHEWABLE TABLET 81 MG: 81 TABLET CHEWABLE at 10:06

## 2022-08-06 RX ADMIN — Medication 40 MG: at 08:17

## 2022-08-06 RX ADMIN — LEVALBUTEROL HYDROCHLORIDE 1.25 MG: 1.25 SOLUTION RESPIRATORY (INHALATION) at 20:31

## 2022-08-06 RX ADMIN — ACETAMINOPHEN 975 MG: 325 TABLET, FILM COATED ORAL at 08:16

## 2022-08-06 RX ADMIN — PREDNISONE 10 MG: 10 TABLET ORAL at 20:01

## 2022-08-06 RX ADMIN — ACETYLCYSTEINE 2 ML: 200 INHALANT RESPIRATORY (INHALATION) at 08:58

## 2022-08-06 RX ADMIN — METOPROLOL TARTRATE 25 MG: 25 TABLET, FILM COATED ORAL at 08:16

## 2022-08-06 RX ADMIN — OXYCODONE HYDROCHLORIDE 10 MG: 5 TABLET ORAL at 21:05

## 2022-08-06 RX ADMIN — OXYCODONE HYDROCHLORIDE 10 MG: 5 TABLET ORAL at 12:05

## 2022-08-06 RX ADMIN — INSULIN ASPART 1 UNITS: 100 INJECTION, SOLUTION INTRAVENOUS; SUBCUTANEOUS at 12:07

## 2022-08-06 ASSESSMENT — ACTIVITIES OF DAILY LIVING (ADL)
ADLS_ACUITY_SCORE: 31

## 2022-08-06 NOTE — PROGRESS NOTES
Pulmonary Medicine  Cystic Fibrosis - Lung Transplant Team  Progress Note  2022     Patient: Sofie Rodriguez  MRN: [id number]  : 1960 (age 62 year old)  Transplant: 2022 (Lung), POD#39  Admission date: 2022    Assessment & Plan:     Sofie Rodriguez is a 62 year old female with a PMH significant for end-stage COPD, HTN, HFpEF, Mycobacterium peregrinum colonization, h/o hepatitis C, HECTOR s/p LEEP procedure, osteopenia, and former methamphetamine use.  Pt. is now s/p BSLT on 22, lungs slightly undersized.   Persistent low dose pressor needs post-op through 7/10.  Extubated POD #2 but with persistent hypercapnia, mostly compensated and only slightly improved with intermittent BiPAP.  Also with left radial artery thrombus (presumed secondary to arterial line) s/p thrombectomy /, BACILIO, and C diff.  Rehabilitation and airway clearance initially limited by dysrhythmia and gastric discomfort, now with gradual improvement.  S/p GJ tube placement in IR .  EGD 8/3 for coffee ground G tube return, noted to have pyloric ulcer.      Today's recommendations:  - Aggressive airway clearance with CPT and nebs decreased to BID  - Wean supplemental O2 as able, exercise and overnight oximetry should be ordered prior to discharge  - Right chest tube managed by CVTS  - CXR today reviewed by me  - Tacro level  low   - dose increase ordered   - repeat level  ordered  - Prednisone taper due   - G tube to gravity drainage prn for GI symptoms, J tube for ALL medications and tube feeds, strict NPO (except ice chips and sips)     S/p bilateral sequential lung transplant (BSLT) for end stage COPD:  Persistent hypercapneic respiratory failure:   Persistent hypotension, Resolved:   Bilateral hydroPTX:  Right hemidiaphragm palsy: Explant pathology with severe emphysema with subpleural bullae formation, changes of chronic bronchitis, subpleural scars and patchy pulmonary edema; benign hilar lymph  nodes.  Extubated 6/30.  Persistent hypercapnia without dyspnea, appears to be a respiratory drive problem. SNIFF (7/14) notable for right hemidiaphragm palsy.  Bronch (7/19) with slight graying of mucosa noted at right anastomosis and scant secretions (slightly increased in RLL).  Chest CT (7/23) with increased loculated fluid within the left hemithorax with specks of air density in the loculated fluid, similar appearing loculated right hydroPTX with minimal decrease in fluid component (right chest tube appears to be outside the loculated hydroPTX), increased size of pleural based lesion in MARLON, and mild subcutaneous emphysema along right chest tube with minimal along tract of removed left chest tube.  S/p left apical chest tube 7/25-8/4, right surgical tube also remains.  Bronch (8/2) with normal inspection of anastomoses.  NIPPV initially overnight for persistent hypercapnia, stopped given lack of improvement with therapy, compensated hypercapnia persists.  On 0.5-1L NC.   - Nebs: levalbuterol and Mucomyst BID   - Pulmonary toilet with chest physiotherapy  BID   - Aerobika and incentive spirometry hourly while awake  - Supplemental oxygen as needed to maintain SpO2 >92% (wean as able), exercise and overnight oximetry should be ordered prior to discharge  - Right chest tube managed by surgical team--removed today  - Will need BLE US prior to discharge (screening for thrombus)     Immunosuppression:  Induction therapy with basiliximab (and high dose IV steroid).  - Tacrolimus (via J tube).  Goal level 8-12.  Repeat level 8/9 (ordered).  -  mg BID (decreased 7/27, GI symptoms/leukopenia)  - Prednisone 12.5 mg BID, next taper due 8/18 (not yet ordered)  Date AM dose (mg) PM dose (mg)   8/4/22 12.5 10   8/18/22 10 10   9/15/22 10 7.5   10/13/22 7.5 7.5   11/10/22 7.5 5   12/8/22 5 5   1/5/23 5 2.5      Prophylaxis:   - Dapsone qMWF for PJP ppx (7/18)  - VGCV for CMV ppx, CMV negative 7/25  - Nystatin for oral  candidiasis ppx, 6 month course  - See below for serologies and viral ppx:    Donor Recipient Intervention   CMV status Positive Positive Valganciclovir POD #8-90   EBV status Positive Positive EBV monitoring as below   HSV status N/A Positive Not indicated      ID:  Donor bronch cultures (OSH) with Strep beta hemolytic (not group A).     H/o M. peregrinum colonization: NGTD post-transplant.  - AFB to be sent on all future bronchs     Streptococcus pneumoniae:  Stenotrophomonas maltophilia: Noted in recipient cultures at time of transplant.  S/p ceftazidime 6/28-7/10, vancomycin 7/7-7/8 and 6/28-6/30, and levofloxacin 7/10-7/12 for total 2 week ABX course.     Hypogammaglobulinemia: IgG adequate at time of transplant, repeat level low (336) on 7/28.  S/p IVIG dosing with premedication on 7/30, tolerated well.  - Repeat IgG in one month (8/28)     H/o hepatitis C:  Diagnosed in 1980s s/p 2m treatment, quant negative since 10/2017, last positive 2/20/17 ([phone]).  Ab positive on 6/2021 with negative HCV PCR.  H/o remote EtOH abuse.  MR elastography (4/27/21) with hepatology review and consult without any concerns post transplant.  Hepatitis C RNA negative and hepatitis C antibody positive (old) on 6/28.     EBV viremia: EBV level 11k, log 4.1 on 7/28.  Not likely to be clinically significant.  - Monitor monthly (due 8/28)     Other relevant problems managed by primary team:      SVT:   Aflutter with RVR: SVT first noted on 7/14, prior to HD line placement.  Continues intermittently.  Aflutter with RVR to 200 7/17 triggered by activity.  EP consulted 7/17 given persistent tachycardia/dysrhythmia.  Midodrine held 7/19.  - AC and metoprolol per primary team     Left hand ischemia: Radial artery thrombosis identified on duplex doppler.  S/p thrombectomy on 7/2.   - AC per primary      BACILIO:   Hyperkalemia: Likely multifactorial including medications (Bactrim, tacrolimus) and hypotension.  Fludrocortisone 7/6-7/11.   Potassium now stable.  S/p non-tunneled HD line 7/14.  Initial iHD run 7/14 limited by afib with RVR vs SVT.  Last iHD run 7/16, line removed 7/22.  Creatinine increased since 7/29 with diamox and elevated tacro level.   - Tacrolimus monitoring as above  - Management per nephrology and primary team     Severe gastroparesis:   Pyloric ulcer: Noted 7/15, cramping pain.  ACR with large stool burden in colon, no obstruction or distention.  Some nausea but no emesis.  CT abdomen (7/15) with moderate to large gastric distention, otherwise without obstruction.  NG placed for LIS with moderate to large output for several days.  Increased abdominal pain 7/17 after clamping for 4 hours, tolerated clamping today without issue.  Gastric emptying study (7/20) with severe gastric emptying delay (95% retention at 4 hours).  S/p GJ tube placement in IR 7/27.  - Simethicone prn  - TF via J tube, management per RD and primary team  - G tube to gravity drainage   - Strict NPO except for ice chips and sips     C diff infection: Abdominal pain with diarrhea noted 7/8, AXR without dilated bowel, moderate colonic stool burden.  C diff positive 7/11.  Loose stools (on tube feeds) stable.  S/p PO vancomycin (7/11-7/28).  - Low threshold to resume vancomycin in the future with ABX course     Hypomagnesemia: Suppressed absorption d/t CNI.   - Continue daily magnesium with replacement protocol prn, schedule oral magnesium supplementation if needed prior to discharge     We appreciate the excellent care provided by the [place] [id number] team.  Recommendations communicated via in person rounding and this note.  Will continue to follow along closely, please do not hesitate to call with any questions or concerns.     Monik Paredes MD MPH  Associate Professor of Medicine  Pager [phone]     Subjective & Interval History:     Patient denies shortness of breath at rest or with conversation.  Doing well with PT.  Anticipating chest tube  "removal.  Would like to wean from O2.    Review of Systems:     C: No fever, no chills, no change in weight, NPO  INTEGUMENTARY/SKIN: No rash or obvious new lesions  ENT/MOUTH: No sore throat, no sinus pain, no nasal congestion or drainage  RESP: See interval history  CV: No chest pain, no palpitations, no peripheral edema  GI: No nausea, no vomiting, no change in stools, no reflux symptoms  : No dysuria  ENDOCRINE: Blood sugars with adequate control  NEURO: No headache  PSYCHIATRIC: Mood stable    Physical Exam:     All notes, images, and labs from past 24 hours (at minimum) were reviewed.    Vital signs:  Temp: 98.9  F (37.2  C) Temp src: Oral BP: 136/76 Pulse: 102   Resp: 18 SpO2: 99 % O2 Device: Nasal cannula Oxygen Delivery: 1 LPM Height: 157.5 cm (5' 2\") Weight: 67.4 kg (148 lb 11.2 oz)  I/O:     Intake/Output Summary (Last 24 hours) at 8/6/2022 1337  Last data filed at 8/6/2022 1200  Gross per 24 hour   Intake 2232.26 ml   Output 1460 ml   Net 772.26 ml     Constitutional: Sitting in chair, in no apparent distress.   HEENT: Eyes with pink conjunctivae, anicteric.  Oral mucosa moist without lesions.  Neck supple without lymphadenopathy.   PULM: Fair air flow bilaterally.  No crackles, no rhonchi, no wheezes.   CV: Normal S1 and S2.  RRR.  No murmur, gallop, or rub.  No peripheral edema.   ABD: NABS, soft, nontender, nondistended.    MSK: Moves all extremities.  No apparent muscle wasting.   NEURO: Alert and oriented, conversant.   SKIN: Warm, dry.  No rash on limited exam.   PSYCH: Mood stable.     Lines, Drains, and Devices:  Midline Catheter Double Lumen (Active)   Site Assessment WDL 08/06/22 1200   External Cath Length (cm) 2 cm 08/04/22 1244   Initial Extremity Circumference (cm) 29 cm 07/28/22 1455   Dressing Intervention Chlorhexidine patch;Transparent 08/06/22 1200   Line Necessity Yes, meets criteria 08/06/22 1200   Dressing Change Due 08/11/22 08/04/22 1600   Purple - Status infusing 08/06/22 1200 "   Purple - Cap Change Due 08/03/22 08/02/22 0400   Red - Status saline locked;blood return noted 08/06/22 1200   Red - Cap Change Due 08/03/22 08/02/22 0400   Extravasation? No 08/06/22 1200   Number of days: 9     Data:     LABS    CMP:   Recent Labs   Lab 08/06/22  1207 08/06/22  0812 08/06/22  0646 08/06/22  0341 08/05/22  0844 08/05/22  0551 08/04/22  1202 08/04/22  0902 08/03/22  0756 08/03/22  0633 08/01/22  1218 08/01/22  0832   NA  --   --  140  --   --  141  --  140  --  140   < > 140   POTASSIUM  --   --  4.3  --   --  4.5  --  4.9  --  5.1   < > 4.2   CHLORIDE  --   --  95*  --   --  97*  --  96*  --  94*   < > 91*   CO2  --   --  41*  --   --  41*  --  41*  --  45*   < > 47*   ANIONGAP  --   --  4*  --   --  3*  --  3*  --  1*   < > 2*   * 135* 143* 151*   < > 142*   < > 122*   < > 113*   < > 144*   BUN  --   --  56.1*  --   --  58.6*  --  60.3*  --  73.5*   < > 80.2*   CR  --   --  1.21*  --   --  1.23*  --  1.20*  --  1.39*   < > 1.67*   GFRESTIMATED  --   --  51*  --   --  50*  --  52*  --  43*   < > 35*   ESTUARDO  --   --  9.0  --   --  9.0  --  9.4  --  9.2   < > 9.0   MAG  --   --  2.6*  --   --  2.3  --  2.4*  --  2.4*   < > 2.7*   PHOS  --   --  3.9  --   --  3.8  --  3.1  --  3.7   < > 4.7*   PROTTOTAL  --   --   --   --   --   --   --  5.4*  --   --   --  5.3*   ALBUMIN  --   --   --   --   --   --   --  2.8*  --   --   --  2.8*   BILITOTAL  --   --   --   --   --   --   --  0.2  --   --   --  0.2   ALKPHOS  --   --   --   --   --   --   --  60  --   --   --  68   AST  --   --   --   --   --   --   --  22  --   --   --  22   ALT  --   --   --   --   --   --   --  12  --   --   --  12    < > = values in this interval not displayed.     CBC:   Recent Labs   Lab 08/06/22  0646 08/05/22  0551 08/04/22  0902 08/03/22  0633   WBC 8.2 7.4 6.9 5.4   RBC 2.60* 2.69* 2.77* 2.11*   HGB 7.8* 8.1* 8.4* 6.5*   HCT 26.3* 26.9* 27.5* 22.2*   * 100 99 105*   MCH 30.0 30.1 30.3 30.8   MCHC 29.7* 30.1*  30.5* 29.3*   RDW 18.3* 19.1* 19.9* 18.6*    214 215 202       INR:   Recent Labs   Lab 08/03/22  0633   INR 0.87       Glucose:   Recent Labs   Lab 08/06/22  1207 08/06/22  0812 08/06/22  0646 08/06/22  0341 08/06/22  0035 08/05/22 2032   * 135* 143* 151* 158* 119*       Blood Gas:   Recent Labs   Lab 08/04/22  0902 08/03/22  0633 08/02/22  0757   PHV 7.42 7.39 7.36   PCO2V 71* 78* 81*   PO2V 31 29 38   HCO3V 46* 47* 46*   LINDY 18.7* 20.4* 17.4*   O2PER 2 35 35       Culture Data No results for input(s): CULT in the last 168 hours.    Virology Data:   Lab Results   Component Value Date    FLUAH1 Not Detected 06/29/2022    FLUAH3 Not Detected 06/29/2022    PA1669 Not Detected 06/29/2022    IFLUB Not Detected 06/29/2022    RSVA Not Detected 06/29/2022    RSVB Not Detected 06/29/2022    PIV1 Not Detected 06/29/2022    PIV2 Not Detected 06/29/2022    PIV3 Not Detected 06/29/2022    HMPV Not Detected 06/29/2022       Historical CMV results (last 3 of prior testing):  Lab Results   Component Value Date    CMVQNT Not Detected 07/25/2022     No results found for: CMVLOG    Urine Studies    Recent Labs   Lab Test 08/01/22  0319 07/08/22  0831 07/05/22  1004   URINEPH 8.0* 5.5 5.5   NITRITE Negative Negative Negative   LEUKEST Negative Negative Negative   WBCU  --  1 5       Most Recent [facility] Pulmonary Function Testing (FVC/FEV1 only)  FVC-Pre   Date Value Ref Range Status   04/29/2022 1.82 L    11/11/2021 2.17 L    06/14/2021 2.00 L      FVC-%Pred-Pre   Date Value Ref Range Status   04/29/2022 58 %    11/11/2021 70 %    06/14/2021 64 %      FEV1-Pre   Date Value Ref Range Status   04/29/2022 0.51 L    11/11/2021 0.53 L    06/14/2021 0.54 L      FEV1-%Pred-Pre   Date Value Ref Range Status   04/29/2022 20 %    11/11/2021 21 %    06/14/2021 21 %        IMAGING    Recent Results (from the past 48 hour(s))   XR Chest Port 1 View    Narrative    EXAM: XR Chest 1 view 8/4/2022 6:45 PM      HISTORY: CT  removal.    COMPARISON: Same day radiograph of the chest at 933.     TECHNIQUE: Frontal view of the chest.    FINDINGS: Left axillary CVC. Right basilar chest tube in stable  position. Interval removal of left apical chest tube. Trachea is  midline. Cardiac mediastinal silhouette is stable. Stable left upper  lobe patchy atelectasis. Stable moderate loculated left pleural  effusion. No definite left pneumothorax. Stable right loculated small  hydropneumothorax.      Impression    IMPRESSION:   1. Interval removal of left apical chest tube.  2. Stable moderate left loculated pleural effusion.  3. Simple right loculated small hydropneumothorax.  4. Stable left upper lobe patchy consolidation/atelectasis. Better  evaluated on CT of 7/28/2022    I have personally reviewed the examination and initial interpretation  and I agree with the findings.    GABRIELLA SNELL MD         SYSTEM ID:  [id number]   XR Chest 2 Views    Narrative    PA and lateral chest    HISTORY: Lung transplant follow-up chest tubes    Present study: 8/4/2022    FINDINGS: Surgical changes bilateral lung transplant. Left axillary  PICC. Loculated pleural effusions on the left. Right basilar chest  tube. Small loculated effusions on the right.      Impression    IMPRESSION: No significant change in bilateral loculated pleural  effusions.    JOHANN MORAES MD         SYSTEM ID:  [id number]

## 2022-08-06 NOTE — PROGRESS NOTES
[facility]    Medicine Progress Note - Hospitalist Service, GOLD TEAM 10    Date of Admission:  6/28/2022    Assessment & Plan        Sofie Rodriguez is a 62 year old female admitted on 6/28/2022. She has PMH of end stage COPD s/p b/l lung transplant on 6/28/2022, HTN, HFpEF, h/o hepC, osteopenia, and former methamphetamine use, and she was transferred to [place] [address] Medicine from MICU on 7/15/2022. She was admitted to the MICU on 7/13/20222 with prior hospital course complicated by left upper extremity acute limb ischemia s/p left radial thrombectomy on 7/1/2022. She was primarily treated for acute hypercapnic respiratory failure on intermittent BIPAP with worsening BACILIO while in the MICU. Breathing is improving, but patient has severely delayed gastric emptying found on NM study. PEGJ placed  Still awaiting chest tube removal and will discharge to ARU vs TCU when able.      Today's Plan:  -resuming aspirin  -testing for C.diff due to diarrhea  -IV PPI to enteral (j tube)    End stage COPD s/p BSLT 6/28/2022  Acute hypercapnic hypoxic respiratory failure (improving)  EBV Viremia  likely 2/2 decreased central respiratory drive vs respiratory muscle weakness and some pulmonary edema / fluid Transplanted 6/28. formal SNIFF test was performed on 7/14 showed R hemidiaphragm palsy. Of note transplanted lungs were also considered small for body size and could contribute to decreased respiratory compensation for hypercarbia. VBG improving gradually  - oxycodone 5-10 mg q4h PRN  - encourage activity and getting up in bed; PT/OT participation  - encourage chest physiotherapy QID  - weaned off Bipap at night  Immunosuppression:  - Prednisone per pulm  - Tacrolimus per pulm  -  BID  Prophylaxis:  - CMV - Valgancyclovir  - Thrush - PO nysatin  - PJP - TMP-SMX; dapsone started 7/18 at 50mg qMWF     Severe Gastroparesis - gastric emptying study 7/20  - gastric emptying study 7/20  showed delayed gastric emptying with retention of 95% of stomach contents after 4 hours;  - 7/27: PEGJ placed- tolerating tube feeds via J     Peptic ulcer at the pyloric junction with acute blood loss anemia in the setting of acute on chronic anemia-- had acute blood loss anemia after transplant-- had stabilized. Gradual hgb downtrend, then after inspection bronch her G tube was hooked back up to gravity and large brown/black output seen. EGD on 8/3 showed pyloric junction ulcer and swelling causing gastric outlet objection  -GI consult, appreciate assistance  -BID PPI switched to IV (was already on BID PPI PO)-stay IV for now-- back to enteral on 8/5  -NPO strict due to outlet obstruction and poor motility with G tube venting  -okay to resume heparin drip 8/4- not bleeding on this as of 8/5  - hgb transfusion 8/3-- hgb stable  -change all meds to J tube admin  -J feeds okay    NJ tube  Feeding regimen: J tube feeds continuous, appreciate RD assistance as well    Pleural Effusion, Right and Left  Left chest tube removed 8/4, right chest tube remaining, appreciate CVTS assistance  - daily CXR  - RIGHT Chest Tube - still with outputs noted  - LEFT Apical Fluid collection - IR targeting via CT guided pigtail placement--removed 8/4     Hypotension, resolved  H/o HTN  H/o HFpEF  Likely vasoplegia post operatively. Last echo 7/7 normal EF with good LV function. S/p hydrocortisone 7/6-7/9 and fludrocortisone 7/6-7/11 without significant improvement.  - off midorine 7/19  - Holding PTA lasix 20mg daily-- diuresis intermittently    BACILIO-- recurrent, improved 8/1-- likely due to supratherapeutic tacrolimus  Hypermagnesemia  Hyperphosphatemia  Metabolic alkalosis  Baseline renal function was normal prior to surgery. New onset renal failure after transplant, likely multifactorial due to pre-renal hypotension, less likely nephrotoxic agents. Overall kidney function improving. Today, Cr fluctuating but BUN increasing, with  unclear urine output (7/22).   - HD cath removed 7/22, trend metabolites  - Adequate UOP despite Cr increase  - 8/1: 500 ml NS per Renal recs- tolerated; held fluid/diuresis on 8/2    C.diff - vanco started 7/12, course completed  -rechecking due to diarrhea (8/5)    Tachyarrthymia  Paroxysmal afib  Paroxysmal aflutter/AT  SVT vs afib.Had an occurrence during HD on 7/14. Had afib with RVR to 200s on 7/17. EP consult placed.asmyptomatic and stable during episodes. Aflutter episode (7/17) with transfer. Vagal maneuver responsive at time. No recent tachyarrhythmia episodes (7/22).   - Per EP: patient has had episodes of possible flutter (vs AT with 2:1 conduction) and afib with RVR  - heparin drip -- eventual plan to transition to DOAC after PEGJ placement and chest tubes resolved (CHADS-VASc score of 2)  - On telemetry  - On metoprolol tartrate 25mg BID  - Discharge on ziopatch for 14 days with EP follow up in 1-2 months after     Stress-induced hyperglycemia with intermittent hypoglycemia (8/3)  Has been controlled on 13-15 units of glargine BID  - on 7 units BID and will re-evaluate 8/5 since she is tapering steroid and has been NPO         Diet: Adult Formula Drip Feeding: Continuous Novasource Renal; Jejunostomy; Goal Rate: 35; mL/hr; Medication - Feeding Tube Flush Frequency: At least 15-30 mL water before and after medication administration and with tube clogging; Amount to Send (Nutri...  NPO for Medical/Clinical Reasons Except for: Ice Chips    DVT Prophylaxis: heparin drip  Dong Catheter: Not present  Central Lines: PRESENT     Cardiac Monitoring: None  Code Status: Full Code      Disposition Plan      Expected Discharge Date: 08/08/2022      Destination: home  Discharge Comments: TCU, lot's of delays  - Chest tubes still in place        The patient's care was discussed with the Patient and pulm Consultant.    Gaby Malhotra MD  Hospitalist Service, GOLD TEAM [address] [place] [address]  [facility]  Securely message with the Vocera Web Console (learn more here)  Text page via [facility] Paging/Directory   Please see signed in provider for up to date coverage information      Clinically Significant Risk Factors Present on Admission                      ______________________________________________________________________    Interval History   No events overnight. Still having watery diarrhea, like the consistency of water. No fever/chills. Breathing feels comfortable. Still with some abdominal discomfort throughout-- said she has had this for at least a few days. Gastric output dark black/brown. Right chest tube still in.    Data reviewed today: I reviewed all medications, new labs and imaging results over the last 24 hours. I personally reviewed no images or EKG's today.    Physical Exam   Vital Signs: Temp: 99.1  F (37.3  C) Temp src: Oral BP: 120/73 Pulse: 105   Resp: 18 SpO2: 99 % O2 Device: Nasal cannula Oxygen Delivery: 1 LPM  Weight: 149 lbs 4.8 oz  Constitutional: Awake, alert and in no distress. Sitting up comfortably in bed   Head: Normocephalic. Atraumatic.   Cardiovascular: mildly tachycardic rate and regular rhythm. No murmurs, clicks, gallops or rubs. No edema   Respiratory: Clear to auscultation without wheezes or crackles. Normal respiratory rate and effort. A bit of pleural rub on right posterior lung fields  Gastrointestinal: Abdomen mildly tender throughout but soft. . BS normal. No masses, organomegaly  Musculoskeletal: extremities normal- no gross deformities noted and normal muscle tone  Skin: no suspicious lesions, rashes, jaundice, or cyanosis -  Psychiatric: mentation appears normal and affect normal/bright      Data   Recent Labs   Lab 08/05/22  1529 08/05/22  1149 08/05/22  0844 08/05/22  0551 08/04/22  1202 08/04/22  0902 08/03/22  0756 08/03/22  0633 08/01/22  1218 08/01/22  0832   WBC  --   --   --  7.4  --  6.9  --  5.4   < >  --    HGB  --   --   --  8.1*  --  8.4*  --   6.5*   < >  --    MCV  --   --   --  100  --  99  --  105*   < >  --    PLT  --   --   --  214  --  215  --  202   < >  --    INR  --   --   --   --   --   --   --  0.87  --   --    NA  --   --   --  141  --  140  --  140   < > 140   POTASSIUM  --   --   --  4.5  --  4.9  --  5.1   < > 4.2   CHLORIDE  --   --   --  97*  --  96*  --  94*   < > 91*   CO2  --   --   --  41*  --  41*  --  45*   < > 47*   BUN  --   --   --  58.6*  --  60.3*  --  73.5*   < > 80.2*   CR  --   --   --  1.23*  --  1.20*  --  1.39*   < > 1.67*   ANIONGAP  --   --   --  3*  --  3*  --  1*   < > 2*   ESTUARDO  --   --   --  9.0  --  9.4  --  9.2   < > 9.0   * 123* 134* 142*   < > 122*   < > 113*   < > 144*   ALBUMIN  --   --   --   --   --  2.8*  --   --   --  2.8*   PROTTOTAL  --   --   --   --   --  5.4*  --   --   --  5.3*   BILITOTAL  --   --   --   --   --  0.2  --   --   --  0.2   ALKPHOS  --   --   --   --   --  60  --   --   --  68   ALT  --   --   --   --   --  12  --   --   --  12   AST  --   --   --   --   --  22  --   --   --  22    < > = values in this interval not displayed.     Recent Results (from the past 24 hour(s))   XR Chest 2 Views    Narrative    PA and lateral chest    HISTORY: Lung transplant follow-up chest tubes    Present study: 8/4/2022    FINDINGS: Surgical changes bilateral lung transplant. Left axillary  PICC. Loculated pleural effusions on the left. Right basilar chest  tube. Small loculated effusions on the right.      Impression    IMPRESSION: No significant change in bilateral loculated pleural  effusions.    JOHANN MORAES MD         SYSTEM ID:  [id number]     Medications     dextrose Stopped (07/15/22 1801)     heparin 850 Units/hr (08/04/22 2144)       acetaminophen  975 mg Per J Tube 2 times daily     acetylcysteine  2 mL Nebulization BID     [START ON 8/6/2022] aspirin  81 mg Per J Tube Daily     calcium carbonate 600 mg-vitamin D 400 units  1 tablet Per J Tube BID w/meals     dapsone  50 mg Per J Tube  Once per day on Mon Wed Fri     insulin aspart  1-12 Units Subcutaneous Q4H     insulin glargine  7 Units Subcutaneous BID     levalbuterol  1.25 mg Nebulization 2 times daily     metoprolol tartrate  25 mg Per Feeding Tube BID     multivitamin, therapeutic  1 tablet Per Feeding Tube Daily     mycophenolate  500 mg Per J Tube BID     nystatin   Topical BID     nystatin  1,000,000 Units Swish & Swallow 4x Daily     ondansetron  4 mg Oral Daily     pantoprazole  40 mg Per J Tube BID     [START ON 8/6/2022] predniSONE  12.5 mg Per J Tube QAM    And     predniSONE  10 mg Per J Tube QPM     protein modular  1 packet Per Feeding Tube Daily     sodium chloride (PF)  10 mL Intracatheter Q8H     sodium chloride (PF)  10 mL Intracatheter Q8H     sodium chloride (PF)  3 mL Intracatheter Q8H     tacrolimus  3 mg Per J Tube BID IS     valGANciclovir  450 mg Oral or Feeding Tube Daily

## 2022-08-06 NOTE — PROGRESS NOTES
CLINICAL NUTRITION SERVICES - BRIEF NOTE         Nutrition Prescription     Recommendations already ordered by Registered Dietitian (RD):  Modify TF regimen as below (formula/rate change)   Osmolite 1.5 Rayshawn @ goal of 45 ml/hr (1080ml/day) + 1 pkt Prosource daily will provide: 1660 kcals (30 kcal/kg), 78 g PRO (1.4 g/kg), 822 ml free H20, 219 g CHO, and 0 g fiber daily.     Future/Additional Recommendations:  Monitor lytes, renal labs, volume status, weight trends, and tolerance to formula change. If K/Phos trend    For last full RD assessment, see note dated 8/3/22    NEW FINDINGS   -Received page from primary team MD requesting change of TF formula from concentrated/renal to standard, per concern for low UOP and pt no longer on dialysis.     -Remains NPO, TF to meet 100% of est needs at this time     -Reviewed labs; elevated BUN/Cr; K+ WNL; Phos WNL; BG trends stable. Na 140, WNL    -Meds:   High sliding scale every 4 hours  Lantus, BID   Thera-Vit  Caltrate, BID    INTERVENTIONS  Implementation  Enteral Nutrition - Modify back to standard formula, monitor lytes and volume status   Collaboration: Primary MD, bedside RN    Monitoring/Evaluation  Will continue to monitor and evaluate per protocol.    Cortez Maxwell RDN, LD, CNSC  [address]B RD pager: [phone]   [address] RD Phone: *[phone]

## 2022-08-06 NOTE — PLAN OF CARE
Pt A&Ox4, calls appropriately, no falls. Pt up to commode x2 overnight to void; no BM overnight. Right Chest tube remain in place, no output overnight. Pt on 1L NC, PRN oxy and  tylenol given per patient request. Pt is tolerating TF overnight, but remains NPO. G tube to gravity drainage with minimal output. Heparin gtt continues to infuse. Will continue to monitor.    Problem: Plan of Care - These are the overarching goals to be used throughout the patient stay.    Goal: Plan of Care Review/Shift Note  Description: The Plan of Care Review/Shift note should be completed every shift.  The Outcome Evaluation is a brief statement about your assessment that the patient is improving, declining, or no change.  This information will be displayed automatically on your shift note.  Outcome: Ongoing, Progressing  Flowsheets (Taken 8/6/2022 0202)  Plan of Care Reviewed With: patient  Outcome Evaluation:    Pt remains NPO    only swabs provided by mouth. Heparin gtt continues  Overall Patient Progress: no change   Goal Outcome Evaluation:    Plan of Care Reviewed With: patient     Overall Patient Progress: no change    Outcome Evaluation: Pt remains NPO; only swabs provided by mouth. Heparin gtt continues

## 2022-08-07 ENCOUNTER — APPOINTMENT (OUTPATIENT)
Dept: GENERAL RADIOLOGY | Facility: CLINIC | Age: 62
DRG: 007 | End: 2022-08-07
Attending: PHYSICIAN ASSISTANT
Payer: MEDICARE

## 2022-08-07 ENCOUNTER — APPOINTMENT (OUTPATIENT)
Dept: PHYSICAL THERAPY | Facility: CLINIC | Age: 62
DRG: 007 | End: 2022-08-07
Attending: THORACIC SURGERY (CARDIOTHORACIC VASCULAR SURGERY)
Payer: MEDICARE

## 2022-08-07 ENCOUNTER — APPOINTMENT (OUTPATIENT)
Dept: OCCUPATIONAL THERAPY | Facility: CLINIC | Age: 62
DRG: 007 | End: 2022-08-07
Attending: THORACIC SURGERY (CARDIOTHORACIC VASCULAR SURGERY)
Payer: MEDICARE

## 2022-08-07 LAB
ANION GAP SERPL CALCULATED.3IONS-SCNC: 3 MMOL/L (ref 7–15)
BASOPHILS # BLD AUTO: 0 10E3/UL (ref 0–0.2)
BASOPHILS NFR BLD AUTO: 0 %
BUN SERPL-MCNC: 56.9 MG/DL (ref 8–23)
CALCIUM SERPL-MCNC: 9.1 MG/DL (ref 8.8–10.2)
CHLORIDE SERPL-SCNC: 96 MMOL/L (ref 98–107)
CREAT SERPL-MCNC: 1.13 MG/DL (ref 0.51–0.95)
DEPRECATED HCO3 PLAS-SCNC: 41 MMOL/L (ref 22–29)
EOSINOPHIL # BLD AUTO: 0.1 10E3/UL (ref 0–0.7)
EOSINOPHIL NFR BLD AUTO: 2 %
ERYTHROCYTE [DISTWIDTH] IN BLOOD BY AUTOMATED COUNT: 18.3 % (ref 10–15)
GFR SERPL CREATININE-BSD FRML MDRD: 55 ML/MIN/1.73M2
GLUCOSE BLDC GLUCOMTR-MCNC: 133 MG/DL (ref 70–99)
GLUCOSE BLDC GLUCOMTR-MCNC: 138 MG/DL (ref 70–99)
GLUCOSE BLDC GLUCOMTR-MCNC: 148 MG/DL (ref 70–99)
GLUCOSE BLDC GLUCOMTR-MCNC: 168 MG/DL (ref 70–99)
GLUCOSE BLDC GLUCOMTR-MCNC: 176 MG/DL (ref 70–99)
GLUCOSE BLDC GLUCOMTR-MCNC: 205 MG/DL (ref 70–99)
GLUCOSE SERPL-MCNC: 160 MG/DL (ref 70–99)
HCT VFR BLD AUTO: 26.2 % (ref 35–47)
HGB BLD-MCNC: 7.7 G/DL (ref 11.7–15.7)
IMM GRANULOCYTES # BLD: 0.1 10E3/UL
IMM GRANULOCYTES NFR BLD: 2 %
LACTATE SERPL-SCNC: 0.5 MMOL/L (ref 0.7–2)
LYMPHOCYTES # BLD AUTO: 0.2 10E3/UL (ref 0.8–5.3)
LYMPHOCYTES NFR BLD AUTO: 3 %
MAGNESIUM SERPL-MCNC: 2.5 MG/DL (ref 1.7–2.3)
MCH RBC QN AUTO: 30.1 PG (ref 26.5–33)
MCHC RBC AUTO-ENTMCNC: 29.4 G/DL (ref 31.5–36.5)
MCV RBC AUTO: 102 FL (ref 78–100)
MONOCYTES # BLD AUTO: 0.5 10E3/UL (ref 0–1.3)
MONOCYTES NFR BLD AUTO: 6 %
NEUTROPHILS # BLD AUTO: 7.4 10E3/UL (ref 1.6–8.3)
NEUTROPHILS NFR BLD AUTO: 87 %
NRBC # BLD AUTO: 0 10E3/UL
NRBC BLD AUTO-RTO: 0 /100
PHOSPHATE SERPL-MCNC: 3 MG/DL (ref 2.5–4.5)
PLATELET # BLD AUTO: 255 10E3/UL (ref 150–450)
POTASSIUM SERPL-SCNC: 4.7 MMOL/L (ref 3.4–5.3)
RBC # BLD AUTO: 2.56 10E6/UL (ref 3.8–5.2)
SODIUM SERPL-SCNC: 140 MMOL/L (ref 136–145)
UFH PPP CHRO-ACNC: 0.31 IU/ML
WBC # BLD AUTO: 8.4 10E3/UL (ref 4–11)

## 2022-08-07 PROCEDURE — 250N000011 HC RX IP 250 OP 636: Performed by: HOSPITALIST

## 2022-08-07 PROCEDURE — 71046 X-RAY EXAM CHEST 2 VIEWS: CPT

## 2022-08-07 PROCEDURE — 85520 HEPARIN ASSAY: CPT | Performed by: STUDENT IN AN ORGANIZED HEALTH CARE EDUCATION/TRAINING PROGRAM

## 2022-08-07 PROCEDURE — 84100 ASSAY OF PHOSPHORUS: CPT | Performed by: STUDENT IN AN ORGANIZED HEALTH CARE EDUCATION/TRAINING PROGRAM

## 2022-08-07 PROCEDURE — 250N000013 HC RX MED GY IP 250 OP 250 PS 637: Performed by: HOSPITALIST

## 2022-08-07 PROCEDURE — 94640 AIRWAY INHALATION TREATMENT: CPT | Mod: 76

## 2022-08-07 PROCEDURE — 97535 SELF CARE MNGMENT TRAINING: CPT | Mod: GO | Performed by: OCCUPATIONAL THERAPIST

## 2022-08-07 PROCEDURE — 120N000002 HC R&B MED SURG/OB UMMC

## 2022-08-07 PROCEDURE — 94668 MNPJ CHEST WALL SBSQ: CPT

## 2022-08-07 PROCEDURE — 97530 THERAPEUTIC ACTIVITIES: CPT | Mod: GP | Performed by: PHYSICAL THERAPIST

## 2022-08-07 PROCEDURE — 80048 BASIC METABOLIC PNL TOTAL CA: CPT | Performed by: STUDENT IN AN ORGANIZED HEALTH CARE EDUCATION/TRAINING PROGRAM

## 2022-08-07 PROCEDURE — 250N000013 HC RX MED GY IP 250 OP 250 PS 637: Performed by: SURGERY

## 2022-08-07 PROCEDURE — 36592 COLLECT BLOOD FROM PICC: CPT | Performed by: STUDENT IN AN ORGANIZED HEALTH CARE EDUCATION/TRAINING PROGRAM

## 2022-08-07 PROCEDURE — 250N000013 HC RX MED GY IP 250 OP 250 PS 637: Performed by: NURSE PRACTITIONER

## 2022-08-07 PROCEDURE — 250N000012 HC RX MED GY IP 250 OP 636 PS 637: Performed by: NURSE PRACTITIONER

## 2022-08-07 PROCEDURE — 36592 COLLECT BLOOD FROM PICC: CPT | Performed by: INTERNAL MEDICINE

## 2022-08-07 PROCEDURE — 250N000011 HC RX IP 250 OP 636

## 2022-08-07 PROCEDURE — 999N000157 HC STATISTIC RCP TIME EA 10 MIN

## 2022-08-07 PROCEDURE — 99233 SBSQ HOSP IP/OBS HIGH 50: CPT | Mod: 24 | Performed by: INTERNAL MEDICINE

## 2022-08-07 PROCEDURE — 250N000009 HC RX 250: Performed by: NURSE PRACTITIONER

## 2022-08-07 PROCEDURE — 83735 ASSAY OF MAGNESIUM: CPT | Performed by: STUDENT IN AN ORGANIZED HEALTH CARE EDUCATION/TRAINING PROGRAM

## 2022-08-07 PROCEDURE — 250N000013 HC RX MED GY IP 250 OP 250 PS 637: Performed by: INTERNAL MEDICINE

## 2022-08-07 PROCEDURE — 250N000012 HC RX MED GY IP 250 OP 636 PS 637: Performed by: INTERNAL MEDICINE

## 2022-08-07 PROCEDURE — 94640 AIRWAY INHALATION TREATMENT: CPT

## 2022-08-07 PROCEDURE — 250N000013 HC RX MED GY IP 250 OP 250 PS 637: Performed by: STUDENT IN AN ORGANIZED HEALTH CARE EDUCATION/TRAINING PROGRAM

## 2022-08-07 PROCEDURE — 99233 SBSQ HOSP IP/OBS HIGH 50: CPT | Performed by: INTERNAL MEDICINE

## 2022-08-07 PROCEDURE — 97116 GAIT TRAINING THERAPY: CPT | Mod: GP | Performed by: PHYSICAL THERAPIST

## 2022-08-07 PROCEDURE — 83605 ASSAY OF LACTIC ACID: CPT | Performed by: INTERNAL MEDICINE

## 2022-08-07 PROCEDURE — 85025 COMPLETE CBC W/AUTO DIFF WBC: CPT | Performed by: STUDENT IN AN ORGANIZED HEALTH CARE EDUCATION/TRAINING PROGRAM

## 2022-08-07 RX ORDER — METOPROLOL TARTRATE 50 MG
50 TABLET ORAL 2 TIMES DAILY
Status: DISCONTINUED | OUTPATIENT
Start: 2022-08-07 | End: 2022-08-17 | Stop reason: HOSPADM

## 2022-08-07 RX ORDER — MYCOPHENOLATE MOFETIL 200 MG/ML
750 POWDER, FOR SUSPENSION ORAL 2 TIMES DAILY
Status: DISCONTINUED | OUTPATIENT
Start: 2022-08-07 | End: 2022-08-17 | Stop reason: HOSPADM

## 2022-08-07 RX ADMIN — CALCIUM CARBONATE 600 MG (1,500 MG)-VITAMIN D3 400 UNIT TABLET 1 TABLET: at 17:06

## 2022-08-07 RX ADMIN — CALCIUM CARBONATE 600 MG (1,500 MG)-VITAMIN D3 400 UNIT TABLET 1 TABLET: at 10:19

## 2022-08-07 RX ADMIN — ACETYLCYSTEINE 2 ML: 200 INHALANT RESPIRATORY (INHALATION) at 20:30

## 2022-08-07 RX ADMIN — INSULIN ASPART 2 UNITS: 100 INJECTION, SOLUTION INTRAVENOUS; SUBCUTANEOUS at 03:49

## 2022-08-07 RX ADMIN — ACETAMINOPHEN 975 MG: 325 TABLET, FILM COATED ORAL at 21:13

## 2022-08-07 RX ADMIN — THERA TABS 1 TABLET: TAB at 11:25

## 2022-08-07 RX ADMIN — INSULIN ASPART 2 UNITS: 100 INJECTION, SOLUTION INTRAVENOUS; SUBCUTANEOUS at 00:16

## 2022-08-07 RX ADMIN — MYCOPHENOLATE MOFETIL 750 MG: 200 POWDER, FOR SUSPENSION ORAL at 10:19

## 2022-08-07 RX ADMIN — MYCOPHENOLATE MOFETIL 750 MG: 200 POWDER, FOR SUSPENSION ORAL at 21:14

## 2022-08-07 RX ADMIN — ONDANSETRON 4 MG: 4 TABLET, ORALLY DISINTEGRATING ORAL at 08:17

## 2022-08-07 RX ADMIN — NYSTATIN: 100000 CREAM TOPICAL at 21:15

## 2022-08-07 RX ADMIN — OXYCODONE HYDROCHLORIDE 5 MG: 5 TABLET ORAL at 12:18

## 2022-08-07 RX ADMIN — HEPARIN SODIUM 850 UNITS/HR: 10000 INJECTION, SOLUTION INTRAVENOUS at 04:36

## 2022-08-07 RX ADMIN — VALGANCICLOVIR HYDROCHLORIDE 450 MG: 50 POWDER, FOR SOLUTION ORAL at 11:25

## 2022-08-07 RX ADMIN — LEVALBUTEROL HYDROCHLORIDE 1.25 MG: 1.25 SOLUTION RESPIRATORY (INHALATION) at 08:39

## 2022-08-07 RX ADMIN — NYSTATIN: 100000 CREAM TOPICAL at 08:20

## 2022-08-07 RX ADMIN — TACROLIMUS 4 MG: 5 CAPSULE ORAL at 17:05

## 2022-08-07 RX ADMIN — Medication 1 PACKET: at 11:31

## 2022-08-07 RX ADMIN — ASPIRIN 81 MG CHEWABLE TABLET 81 MG: 81 TABLET CHEWABLE at 10:19

## 2022-08-07 RX ADMIN — HYDROXYZINE HYDROCHLORIDE 25 MG: 25 TABLET ORAL at 12:18

## 2022-08-07 RX ADMIN — OXYCODONE HYDROCHLORIDE 10 MG: 5 TABLET ORAL at 17:05

## 2022-08-07 RX ADMIN — Medication 40 MG: at 21:14

## 2022-08-07 RX ADMIN — INSULIN ASPART 1 UNITS: 100 INJECTION, SOLUTION INTRAVENOUS; SUBCUTANEOUS at 17:00

## 2022-08-07 RX ADMIN — INSULIN ASPART 3 UNITS: 100 INJECTION, SOLUTION INTRAVENOUS; SUBCUTANEOUS at 11:26

## 2022-08-07 RX ADMIN — PREDNISONE 10 MG: 10 TABLET ORAL at 21:14

## 2022-08-07 RX ADMIN — OXYCODONE HYDROCHLORIDE 10 MG: 5 TABLET ORAL at 06:02

## 2022-08-07 RX ADMIN — NYSTATIN 1000000 UNITS: 100000 SUSPENSION ORAL at 08:21

## 2022-08-07 RX ADMIN — ACETAMINOPHEN 975 MG: 325 TABLET, FILM COATED ORAL at 08:18

## 2022-08-07 RX ADMIN — PREDNISONE 12.5 MG: 2.5 TABLET ORAL at 08:18

## 2022-08-07 RX ADMIN — TACROLIMUS 4 MG: 5 CAPSULE ORAL at 08:19

## 2022-08-07 RX ADMIN — Medication 40 MG: at 08:19

## 2022-08-07 RX ADMIN — ACETYLCYSTEINE 2 ML: 200 INHALANT RESPIRATORY (INHALATION) at 08:39

## 2022-08-07 RX ADMIN — METOPROLOL TARTRATE 50 MG: 50 TABLET, FILM COATED ORAL at 21:13

## 2022-08-07 RX ADMIN — NYSTATIN 1000000 UNITS: 100000 SUSPENSION ORAL at 17:02

## 2022-08-07 RX ADMIN — METOPROLOL TARTRATE 25 MG: 25 TABLET, FILM COATED ORAL at 08:18

## 2022-08-07 RX ADMIN — LEVALBUTEROL HYDROCHLORIDE 1.25 MG: 1.25 SOLUTION RESPIRATORY (INHALATION) at 20:30

## 2022-08-07 RX ADMIN — ACETAMINOPHEN 650 MG: 325 TABLET, FILM COATED ORAL at 17:05

## 2022-08-07 RX ADMIN — NYSTATIN 1000000 UNITS: 100000 SUSPENSION ORAL at 21:14

## 2022-08-07 ASSESSMENT — ACTIVITIES OF DAILY LIVING (ADL)
ADLS_ACUITY_SCORE: 33
ADLS_ACUITY_SCORE: 31
ADLS_ACUITY_SCORE: 33
ADLS_ACUITY_SCORE: 31

## 2022-08-07 NOTE — PROGRESS NOTES
[facility]    Medicine Progress Note - Hospitalist Service, GOLD TEAM 10    Date of Admission:  6/28/2022    Assessment & Plan        Sofie Rodriguez is a 62 year old female admitted on 6/28/2022. She has PMH of end stage COPD s/p b/l lung transplant on 6/28/2022, HTN, HFpEF, h/o hepC, osteopenia, and former methamphetamine use, and she was transferred to [place] [address] [facility] from MICU on 7/15/2022. She was admitted to the MICU on 7/13/20222 with prior hospital course complicated by left upper extremity acute limb ischemia s/p left radial thrombectomy on 7/1/2022. She was primarily treated for acute hypercapnic respiratory failure on intermittent BIPAP with worsening BACILIO while in the MICU. Breathing is improving, but patient has severely delayed gastric emptying found on NM study. PEGJ placed  Still awaiting chest tube removal and will discharge to ARU vs TCU when able.      Today's Plan:  -negative C.diff  -gave some IV fluids due to lower UOP, tachycardia and still being on a concentrated formula despite being off dialysis and no longer able to take PO fluids  -will watch insulin needs as this formula has a bit more carbs    End stage COPD s/p BSLT 6/28/2022  Acute hypercapnic hypoxic respiratory failure (improving)  EBV Viremia  likely 2/2 decreased central respiratory drive vs respiratory muscle weakness and some pulmonary edema / fluid Transplanted 6/28. formal SNIFF test was performed on 7/14 showed R hemidiaphragm palsy. Of note transplanted lungs were also considered small for body size and could contribute to decreased respiratory compensation for hypercarbia. VBG improving gradually  - oxycodone 5-10 mg q4h PRN  - encourage activity and getting up in bed; PT/OT participation  - encourage chest physiotherapy QID  - weaned off Bipap at night  Immunosuppression:  - Prednisone per pulm  - Tacrolimus per pulm  -  BID  Prophylaxis:  - CMV - Valgancyclovir  - Thrush  - PO nysatin  - PJP - TMP-SMX; dapsone started 7/18 at 50mg qMWF     Severe Gastroparesis - gastric emptying study 7/20  - gastric emptying study 7/20 showed delayed gastric emptying with retention of 95% of stomach contents after 4 hours;  - 7/27: PEGJ placed- tolerating tube feeds via J     Peptic ulcer at the pyloric junction with acute blood loss anemia in the setting of acute on chronic anemia-- had acute blood loss anemia after transplant-- had stabilized. Gradual hgb downtrend, then after inspection bronch her G tube was hooked back up to gravity and large brown/black output seen. EGD on 8/3 showed pyloric junction ulcer and swelling causing gastric outlet objection  -GI consult, appreciate assistance  -BID PPI switched to IV (was already on BID PPI PO)-stay IV for now-- back to enteral on 8/5  -NPO strict due to outlet obstruction and poor motility with G tube venting  -okay to resume heparin drip 8/4- not bleeding on this as of 8/5  - hgb transfusion 8/3-- hgb stable  -change all meds to J tube admin  -J feeds okay    NJ tube  Feeding regimen: J tube feeds continuous-- transitioned formula to non-renal formula as her kidneys have improved/needs more volume, appreciate RD assistance as well    Pleural Effusion, Right and Left  Left chest tube removed 8/4, right chest tube remaining, appreciate CVTS assistance  - daily CXR  - RIGHT Chest Tube - still with outputs noted  - LEFT Apical Fluid collection - IR targeting via CT guided pigtail placement--removed 8/4     Hypotension, resolved  H/o HTN  H/o HFpEF  Likely vasoplegia post operatively. Last echo 7/7 normal EF with good LV function. S/p hydrocortisone 7/6-7/9 and fludrocortisone 7/6-7/11 without significant improvement.  - off midorine 7/19  - Holding PTA lasix 20mg daily-- diuresis intermittently    BACILIO-- recurrent, improved 8/1-- likely due to supratherapeutic tacrolimus  Hypermagnesemia  Hyperphosphatemia  Metabolic alkalosis  Baseline renal function  was normal prior to surgery. New onset renal failure after transplant, likely multifactorial due to pre-renal hypotension, less likely nephrotoxic agents. Overall kidney function improving. Today, Cr fluctuating but BUN increasing, with unclear urine output (7/22).   - HD cath removed 7/22, trend metabolites  - 8/1: 500 ml NS per Renal recs- tolerated; held fluid/diuresis on 8/2  - 8/6 - gave a bit of fluid    C.diff - vanco started 7/12, course completed  -rechecking due to diarrhea (8/5)-- was negative on 8/6    Tachyarrthymia  Paroxysmal afib  Paroxysmal aflutter/AT  SVT vs afib.Had an occurrence during HD on 7/14. Had afib with RVR to 200s on 7/17. EP consult placed.asmyptomatic and stable during episodes. Aflutter episode (7/17) with transfer. Vagal maneuver responsive at time. No recent tachyarrhythmia episodes (7/22).   - Per EP: patient has had episodes of possible flutter (vs AT with 2:1 conduction) and afib with RVR  - heparin drip -- eventual plan to transition to DOAC after PEGJ placement and chest tubes resolved (CHADS-VASc score of 2)  - On telemetry  - On metoprolol tartrate 25mg BID  - Discharge on ziopatch for 14 days with EP follow up in 1-2 months after     Stress-induced hyperglycemia with intermittent hypoglycemia (8/3)  Has been controlled on 13-15 units of glargine BID  - on 7 units BID  8/5 since she is tapering steroid and has been NPO  -- will continue to monitor her needs        Diet: NPO for Medical/Clinical Reasons Except for: Ice Chips  Adult Formula Drip Feeding: Continuous Osmolite 1.5; Jejunostomy; Goal Rate: 45; mL/hr; Medication - Feeding Tube Flush Frequency: At least 15-30 mL water before and after medication administration and with tube clogging; Amount to Send (Nutrition...    DVT Prophylaxis: heparin drip  Dong Catheter: Not present  Central Lines: PRESENT     Cardiac Monitoring: None  Code Status: Full Code      Disposition Plan      Expected Discharge Date: 08/09/2022     Discharge Delays: Other (Add Comment)  Destination: home  Discharge Comments: TCU, lot's of delays  - Chest tubes still in place        The patient's care was discussed with the Bedside Nurse, Patient and pulm, nutrition Consultant.    Gaby Malhotra MD  Hospitalist Service, GOLD TEAM 10  [facility]  Securely message with the Vocera Web Console (learn more here)  Text page via [facility] Paging/Directory   Please see signed in provider for up to date coverage information      Clinically Significant Risk Factors Present on Admission                      ______________________________________________________________________    Interval History   No events overnight. Has no concerns or complaints. Does wonder when she'll be able to eat by mouth. Still with some abd pain that is generalized but doesn't feel as though it has changed much. No fever/chills. She had fewer stools yesterday and today but still quite watery.Gastric output slowing down.    Data reviewed today: I reviewed all medications, new labs and imaging results over the last 24 hours. I personally reviewed no images or EKG's today.    Physical Exam   Vital Signs: Temp: 98.9  F (37.2  C) Temp src: Oral BP: 125/69 Pulse: 106   Resp: 18 SpO2: 99 % O2 Device: Nasal cannula Oxygen Delivery: 1 LPM  Weight: 148 lbs 11.2 oz  Constitutional: Awake, alert and in no distress. Sitting comfortably in bed  Head: Normocephalic. Atraumatic.   Cardiovascular: mildly tachycardic rate and regular rhythm. No murmurs, clicks, gallops or rubs. No edema   Respiratory: Clear to auscultation without wheezes or crackles. Normal respiratory rate and effort.   Gastrointestinal: Abdomen soft but a bit tender throughout to deep palpation. Bowel sounds active. PEGJ in place  Musculoskeletal: extremities normal- no gross deformities noted and normal muscle tone  Skin: no suspicious lesions, rashes, jaundice, or cyanosis   Psychiatric: mentation  appears normal and affect normal/bright      Data   Recent Labs   Lab 08/06/22  1958 08/06/22  1505 08/06/22  1207 08/06/22  0812 08/06/22  0646 08/05/22  0844 08/05/22  0551 08/04/22  1202 08/04/22  0902 08/03/22  0756 08/03/22  0633 08/01/22  1218 08/01/22  0832   WBC  --   --   --   --  8.2  --  7.4  --  6.9  --  5.4   < >  --    HGB  --   --   --   --  7.8*  --  8.1*  --  8.4*  --  6.5*   < >  --    MCV  --   --   --   --  101*  --  100  --  99  --  105*   < >  --    PLT  --   --   --   --  223  --  214  --  215  --  202   < >  --    INR  --   --   --   --   --   --   --   --   --   --  0.87  --   --    NA  --   --   --   --  140  --  141  --  140  --  140   < > 140   POTASSIUM  --   --   --   --  4.3  --  4.5  --  4.9  --  5.1   < > 4.2   CHLORIDE  --   --   --   --  95*  --  97*  --  96*  --  94*   < > 91*   CO2  --   --   --   --  41*  --  41*  --  41*  --  45*   < > 47*   BUN  --   --   --   --  56.1*  --  58.6*  --  60.3*  --  73.5*   < > 80.2*   CR  --   --   --   --  1.21*  --  1.23*  --  1.20*  --  1.39*   < > 1.67*   ANIONGAP  --   --   --   --  4*  --  3*  --  3*  --  1*   < > 2*   ESTUARDO  --   --   --   --  9.0  --  9.0  --  9.4  --  9.2   < > 9.0   * 167* 147*   < > 143*   < > 142*   < > 122*   < > 113*   < > 144*   ALBUMIN  --   --   --   --   --   --   --   --  2.8*  --   --   --  2.8*   PROTTOTAL  --   --   --   --   --   --   --   --  5.4*  --   --   --  5.3*   BILITOTAL  --   --   --   --   --   --   --   --  0.2  --   --   --  0.2   ALKPHOS  --   --   --   --   --   --   --   --  60  --   --   --  68   ALT  --   --   --   --   --   --   --   --  12  --   --   --  12   AST  --   --   --   --   --   --   --   --  22  --   --   --  22    < > = values in this interval not displayed.     Recent Results (from the past 24 hour(s))   XR Chest 2 Views    Narrative    EXAMINATION: XR CHEST 2 VIEWS, 8/6/2022 1:17 PM    COMPARISON: 8/5/2022    HISTORY: interval follow up, lung transplant, bilateral  chest tubes    FINDINGS: Small left pleural effusion. Heart size is normal. Small  right pleural effusion is predominately seen overlying the apex. No  new airspace opacities. Lung transplant changes.      Impression    IMPRESSION: Bilateral lung transplant with bilateral small pleural  effusions, not substantially changed.      GEORGE ROGERS MD         SYSTEM ID:  [id number]     Medications     dextrose Stopped (07/15/22 1801)     heparin 850 Units/hr (08/05/22 2314)       acetaminophen  975 mg Per J Tube 2 times daily     acetylcysteine  2 mL Nebulization BID     aspirin  81 mg Per J Tube Daily     calcium carbonate 600 mg-vitamin D 400 units  1 tablet Per J Tube BID w/meals     dapsone  50 mg Per J Tube Once per day on Mon Wed Fri     insulin aspart  1-12 Units Subcutaneous Q4H     insulin glargine  7 Units Subcutaneous BID     levalbuterol  1.25 mg Nebulization 2 times daily     metoprolol tartrate  25 mg Per Feeding Tube BID     multivitamin, therapeutic  1 tablet Per Feeding Tube Daily     mycophenolate  500 mg Per J Tube BID     nystatin   Topical BID     nystatin  1,000,000 Units Swish & Swallow 4x Daily     ondansetron  4 mg Oral Daily     pantoprazole  40 mg Per J Tube BID     predniSONE  12.5 mg Per J Tube QAM    And     predniSONE  10 mg Per J Tube QPM     protein modular  1 packet Per Feeding Tube Daily     sodium chloride (PF)  10 mL Intracatheter Q8H     sodium chloride (PF)  10 mL Intracatheter Q8H     sodium chloride (PF)  3 mL Intracatheter Q8H     tacrolimus  4 mg Per J Tube BID IS     valGANciclovir  450 mg Oral or Feeding Tube Daily

## 2022-08-07 NOTE — PROGRESS NOTES
[id number]  Major Shift Events:  None.  Neuro: A&OX4.  Cardiac: mild febrile, Tmax 99-tylenol given.  SR/ST 90s-100s. Is on heparin gtt at 850u/hr and 10a is therapeutical next check in am.  Resp: Bases course, on 1-2L NC while sleeping.  GI/: Blanchable redness on the perineum and aleshia cream applied.  She will be started on cyclical TF 8pm to 8am at 45ml and increase to goal 90ml. Up with sba and used the commode, wearing attends  Plan: Will continue to monitor and notify MD of status changes.

## 2022-08-07 NOTE — PROGRESS NOTES
"/67   Pulse 93   Temp 98.4  F (36.9  C) (Oral)   Resp 18   Ht 1.575 m (5' 2\")   Wt 63.6 kg (140 lb 3.2 oz)   SpO2 93%   BMI 25.64 kg/m       Neuro: A&Ox4.   Cardiac: Afebrile, VSS.   Respiratory: RA , may need O2 when asleep.  GI/: Voiding spontaneously. No BM this shift.   Diet/appetite: NPO, TF, cycled 8p-8a. Denies nausea   Activity: SBA  Pain: Denies   Skin: No new deficits noted.  Lines:Midline x2  Drains: PEG  No new complaints.Will continue to monitor and follow plan of care.       "

## 2022-08-07 NOTE — PROGRESS NOTES
Pulmonary Medicine  Cystic Fibrosis - Lung Transplant Team  Progress Note  2022     Patient: Sofie Rodriguez  MRN: [id number]  : 1960 (age 62 year old)  Transplant: 2022 (Lung), POD#40  Admission date: 2022    Assessment & Plan:     Sofie Rodriguez is a 62 year old female with a PMH significant for end-stage COPD, HTN, HFpEF, Mycobacterium peregrinum colonization, h/o hepatitis C, HECTOR s/p LEEP procedure, osteopenia, and former methamphetamine use.  Pt. is now s/p BSLT on 22, lungs slightly undersized.   Persistent low dose pressor needs post-op through 7/10.  Extubated POD #2 but with persistent hypercapnia, mostly compensated and only slightly improved with intermittent BiPAP.  Also with left radial artery thrombus (presumed secondary to arterial line) s/p thrombectomy /, BACILIO, and C diff.  Rehabilitation and airway clearance initially limited by dysrhythmia and gastric discomfort, now with gradual improvement.  S/p GJ tube placement in IR .  EGD 8/3 for coffee ground G tube return, noted to have pyloric ulcer.      Today's recommendations:  - Airway clearance with CPT and nebs BID  - oxygenation improved and last chest tube removed   - Wean supplemental O2 as able, exercise and overnight oximetry should be ordered prior to discharge  - CXR today reviewed by me  - Tacro repeat level  ordered  - Prednisone taper due   - G tube to gravity drainage prn for GI symptoms, J tube for ALL medications and tube feeds, strict NPO (except ice chips and sips)  - will need to start discharge planning regarding cycled tube feeds, insulin and appropriate oral intake plan  - MMF increased given stable WBC     S/p bilateral sequential lung transplant (BSLT) for end stage COPD:  Persistent hypercapneic respiratory failure:   Persistent hypotension, Resolved:   Bilateral hydroPTX:  Right hemidiaphragm palsy: Explant pathology with severe emphysema with subpleural bullae formation, changes  of chronic bronchitis, subpleural scars and patchy pulmonary edema; benign hilar lymph nodes.  Extubated 6/30.  Persistent hypercapnia without dyspnea, appears to be a respiratory drive problem. SNIFF (7/14) notable for right hemidiaphragm palsy.  Bronch (7/19) with slight graying of mucosa noted at right anastomosis and scant secretions (slightly increased in RLL).  Chest CT (7/23) with increased loculated fluid within the left hemithorax with specks of air density in the loculated fluid, similar appearing loculated right hydroPTX with minimal decrease in fluid component (right chest tube appears to be outside the loculated hydroPTX), increased size of pleural based lesion in MARLON, and mild subcutaneous emphysema along right chest tube with minimal along tract of removed left chest tube.  S/p left apical chest tube 7/25-8/4, right surgical tube also remains.  Bronch (8/2) with normal inspection of anastomoses.  NIPPV initially overnight for persistent hypercapnia, stopped given lack of improvement with therapy, compensated hypercapnia persists.  On 0.5-1L NC.   - Nebs: levalbuterol and Mucomyst BID   - Pulmonary toilet with chest physiotherapy BID   -consider discontinuing 8/8  - Aerobika and incentive spirometry hourly while awake  - Supplemental oxygen as needed to maintain SpO2 >92% (wean as able), exercise and overnight oximetry should be ordered prior to discharge  - All chest tubes removed  - Will need BLE US prior to discharge (screening for thrombus)     Immunosuppression:  Induction therapy with basiliximab (and high dose IV steroid).  - Tacrolimus (via J tube).  Goal level 8-12.  Repeat level 8/9 (ordered).  -  mg BID (decreased 7/27, GI symptoms/leukopenia)  Increased to 750 BID 8/7  - Prednisone 12.5 mg BID, next taper due 8/18 (not yet ordered)  Date AM dose (mg) PM dose (mg)   8/4/22 12.5 10   8/18/22 10 10   9/15/22 10 7.5   10/13/22 7.5 7.5   11/10/22 7.5 5   12/8/22 5 5   1/5/23 5 2.5       Prophylaxis:   - Dapsone qMWF for PJP ppx (7/18)  - VGCV for CMV ppx, CMV negative 7/25  - Nystatin for oral candidiasis ppx, 6 month course  - See below for serologies and viral ppx:    Donor Recipient Intervention   CMV status Positive Positive Valganciclovir POD #8-90   EBV status Positive Positive EBV monitoring as below   HSV status N/A Positive Not indicated      ID:  Donor bronch cultures (OSH) with Strep beta hemolytic (not group A).     H/o M. peregrinum colonization: NGTD post-transplant.  - AFB to be sent on all future bronchs     Streptococcus pneumoniae:  Stenotrophomonas maltophilia: Noted in recipient cultures at time of transplant.  S/p ceftazidime 6/28-7/10, vancomycin 7/7-7/8 and 6/28-6/30, and levofloxacin 7/10-7/12 for total 2 week ABX course.     Hypogammaglobulinemia: IgG adequate at time of transplant, repeat level low (336) on 7/28.  S/p IVIG dosing with premedication on 7/30, tolerated well.  - Repeat IgG in one month (8/28)     H/o hepatitis C:  Diagnosed in 1980s s/p 2m treatment, quant negative since 10/2017, last positive 2/20/17 ([phone]).  Ab positive on 6/2021 with negative HCV PCR.  H/o remote EtOH abuse.  MR elastography (4/27/21) with hepatology review and consult without any concerns post transplant.  Hepatitis C RNA negative and hepatitis C antibody positive (old) on 6/28.     EBV viremia: EBV level 11k, log 4.1 on 7/28.  Not likely to be clinically significant.  - Monitor monthly (due 8/28)     Other relevant problems managed by primary team:      SVT:   Aflutter with RVR: SVT first noted on 7/14, prior to HD line placement.  Continues intermittently.  Aflutter with RVR to 200 7/17 triggered by activity.  EP consulted 7/17 given persistent tachycardia/dysrhythmia.  Midodrine held 7/19.  - AC and metoprolol per primary team     Left hand ischemia: Radial artery thrombosis identified on duplex doppler.  S/p thrombectomy on 7/2.   - AC per  primary      BACILIO:   Hyperkalemia: Likely multifactorial including medications (Bactrim, tacrolimus) and hypotension.  Fludrocortisone 7/6-7/11.  Potassium now stable.  S/p non-tunneled HD line 7/14.  Initial iHD run 7/14 limited by afib with RVR vs SVT.  Last iHD run 7/16, line removed 7/22.  Creatinine increased since 7/29 with diamox and elevated tacro level.   - Tacrolimus monitoring as above  - Management per primary team     Severe gastroparesis:   Pyloric ulcer: Noted 7/15, cramping pain.  ACR with large stool burden in colon, no obstruction or distention.  Some nausea but no emesis.  CT abdomen (7/15) with moderate to large gastric distention, otherwise without obstruction.  NG placed for LIS with moderate to large output for several days.  Increased abdominal pain 7/17 after clamping for 4 hours, tolerated clamping today without issue.  Gastric emptying study (7/20) with severe gastric emptying delay (95% retention at 4 hours).  S/p GJ tube placement in IR 7/27.  - Simethicone prn  - TF via J tube, management per RD and primary team  - G tube to gravity drainage   - Strict NPO except for ice chips and sips     C diff infection: Abdominal pain with diarrhea noted 7/8, AXR without dilated bowel, moderate colonic stool burden.  C diff positive 7/11.  Loose stools (on tube feeds) stable.  S/p PO vancomycin (7/11-7/28).  - Low threshold to resume vancomycin in the future with ABX course     Hypomagnesemia: Suppressed absorption d/t CNI.   - Continue daily magnesium with replacement protocol prn, schedule oral magnesium supplementation if needed prior to discharge     We appreciate the excellent care provided by the [place] [id number] team.  Recommendations communicated via in person rounding and this note.  Will continue to follow along closely, please do not hesitate to call with any questions or concerns.     Monik Paredes MD MPH  Associate Professor of Medicine  Pager [phone]     Subjective & Interval  "History:     Patient denies shortness of breath at rest or with conversation.  Doing well with PT.  Able to take a deeper breath with chest tube removal. Off O2 this am.  Denies chest pain.  No cough.  Some ongoing diffuse abdominal pain that is unchanged.    Review of Systems:     C: No fever, no chills, no change in weight, NPO  INTEGUMENTARY/SKIN: No rash or obvious new lesions  ENT/MOUTH: No sore throat, no sinus pain, no nasal congestion or drainage  RESP: See interval history  CV: No chest pain, no palpitations, no peripheral edema  GI: No nausea, no vomiting, no change in stools, no reflux symptoms  : No dysuria  ENDOCRINE: Blood sugars with adequate control  NEURO: No headache  PSYCHIATRIC: Mood stable    Physical Exam:     All notes, images, and labs from past 24 hours (at minimum) were reviewed.    Vital signs:  Temp: 98.4  F (36.9  C) Temp src: Oral BP: 114/67 Pulse: 93   Resp: 18 SpO2: 93 % O2 Device: None (Room air) Oxygen Delivery: 1 LPM Height: 157.5 cm (5' 2\") Weight: 63.6 kg (140 lb 3.2 oz)  I/O:     Intake/Output Summary (Last 24 hours) at 8/7/2022 1528  Last data filed at 8/7/2022 1400  Gross per 24 hour   Intake 1558.38 ml   Output 825 ml   Net 733.38 ml     Constitutional: Sitting on side of bed, in no apparent distress.   HEENT: Eyes with pink conjunctivae, anicteric.  Oral mucosa moist without lesions.  Neck supple without lymphadenopathy.   PULM: Improved air flow bilaterally.  No crackles, no rhonchi, no wheezes.   CV: Normal S1 and S2.  RRR.  No murmur, gallop, or rub.  No peripheral edema.   ABD: NABS, soft, nontender, nondistended.    MSK: Moves all extremities.  No apparent muscle wasting.   NEURO: Alert and oriented, conversant.   SKIN: Warm, dry.  No rash on limited exam.   PSYCH: Mood stable.     Lines, Drains, and Devices:  Midline Catheter Double Lumen (Active)   Site Assessment WDL 08/06/22 1200   External Cath Length (cm) 2 cm 08/04/22 1244   Initial Extremity Circumference (cm) " 29 cm 07/28/22 1455   Dressing Intervention Chlorhexidine patch;Transparent 08/06/22 1200   Line Necessity Yes, meets criteria 08/06/22 1200   Dressing Change Due 08/11/22 08/04/22 1600   Purple - Status infusing 08/06/22 1200   Purple - Cap Change Due 08/03/22 08/02/22 0400   Red - Status saline locked;blood return noted 08/06/22 1200   Red - Cap Change Due 08/03/22 08/02/22 0400   Extravasation? No 08/06/22 1200   Number of days: 9     Data:     LABS    CMP:   Recent Labs   Lab 08/07/22  1121 08/07/22  0842 08/07/22  0827 08/07/22  0348 08/06/22  0812 08/06/22  0646 08/05/22  0844 08/05/22  0551 08/04/22  1202 08/04/22  0902 08/01/22  1218 08/01/22  0832   NA  --  140  --   --   --  140  --  141  --  140   < > 140   POTASSIUM  --  4.7  --   --   --  4.3  --  4.5  --  4.9   < > 4.2   CHLORIDE  --  96*  --   --   --  95*  --  97*  --  96*   < > 91*   CO2  --  41*  --   --   --  41*  --  41*  --  41*   < > 47*   ANIONGAP  --  3*  --   --   --  4*  --  3*  --  3*   < > 2*   * 160* 133* 176*   < > 143*   < > 142*   < > 122*   < > 144*   BUN  --  56.9*  --   --   --  56.1*  --  58.6*  --  60.3*   < > 80.2*   CR  --  1.13*  --   --   --  1.21*  --  1.23*  --  1.20*   < > 1.67*   GFRESTIMATED  --  55*  --   --   --  51*  --  50*  --  52*   < > 35*   ESTUARDO  --  9.1  --   --   --  9.0  --  9.0  --  9.4   < > 9.0   MAG  --  2.5*  --   --   --  2.6*  --  2.3  --  2.4*   < > 2.7*   PHOS  --  3.0  --   --   --  3.9  --  3.8  --  3.1   < > 4.7*   PROTTOTAL  --   --   --   --   --   --   --   --   --  5.4*  --  5.3*   ALBUMIN  --   --   --   --   --   --   --   --   --  2.8*  --  2.8*   BILITOTAL  --   --   --   --   --   --   --   --   --  0.2  --  0.2   ALKPHOS  --   --   --   --   --   --   --   --   --  60  --  68   AST  --   --   --   --   --   --   --   --   --  22  --  22   ALT  --   --   --   --   --   --   --   --   --  12  --  12    < > = values in this interval not displayed.     CBC:   Recent Labs   Lab  08/07/22  0842 08/06/22  0646 08/05/22  0551 08/04/22  0902   WBC 8.4 8.2 7.4 6.9   RBC 2.56* 2.60* 2.69* 2.77*   HGB 7.7* 7.8* 8.1* 8.4*   HCT 26.2* 26.3* 26.9* 27.5*   * 101* 100 99   MCH 30.1 30.0 30.1 30.3   MCHC 29.4* 29.7* 30.1* 30.5*   RDW 18.3* 18.3* 19.1* 19.9*    223 214 215       INR:   Recent Labs   Lab 08/03/22  0633   INR 0.87       Glucose:   Recent Labs   Lab 08/07/22  1121 08/07/22  0842 08/07/22  0827 08/07/22  0348 08/07/22  0013 08/06/22  1958   * 160* 133* 176* 168* 160*       Blood Gas:   Recent Labs   Lab 08/04/22  0902 08/03/22  0633 08/02/22  0757   PHV 7.42 7.39 7.36   PCO2V 71* 78* 81*   PO2V 31 29 38   HCO3V 46* 47* 46*   LINDY 18.7* 20.4* 17.4*   O2PER 2 35 35       Culture Data No results for input(s): CULT in the last 168 hours.    Virology Data:   Lab Results   Component Value Date    FLUAH1 Not Detected 06/29/2022    FLUAH3 Not Detected 06/29/2022    RZ1944 Not Detected 06/29/2022    IFLUB Not Detected 06/29/2022    RSVA Not Detected 06/29/2022    RSVB Not Detected 06/29/2022    PIV1 Not Detected 06/29/2022    PIV2 Not Detected 06/29/2022    PIV3 Not Detected 06/29/2022    HMPV Not Detected 06/29/2022       Historical CMV results (last 3 of prior testing):  Lab Results   Component Value Date    CMVQNT Not Detected 07/25/2022     No results found for: CMVLOG    Urine Studies    Recent Labs   Lab Test 08/01/22  0319 07/08/22  0831 07/05/22  1004   URINEPH 8.0* 5.5 5.5   NITRITE Negative Negative Negative   LEUKEST Negative Negative Negative   WBCU  --  1 5       Most Recent [facility] Pulmonary Function Testing (FVC/FEV1 only)  FVC-Pre   Date Value Ref Range Status   04/29/2022 1.82 L    11/11/2021 2.17 L    06/14/2021 2.00 L      FVC-%Pred-Pre   Date Value Ref Range Status   04/29/2022 58 %    11/11/2021 70 %    06/14/2021 64 %      FEV1-Pre   Date Value Ref Range Status   04/29/2022 0.51 L    11/11/2021 0.53 L    06/14/2021 0.54 L      FEV1-%Pred-Pre   Date Value Ref  Range Status   04/29/2022 20 %    11/11/2021 21 %    06/14/2021 21 %        IMAGING    Recent Results (from the past 48 hour(s))   XR Chest Port 1 View    Narrative    EXAM: XR Chest 1 view 8/4/2022 6:45 PM      HISTORY: CT removal.    COMPARISON: Same day radiograph of the chest at 933.     TECHNIQUE: Frontal view of the chest.    FINDINGS: Left axillary CVC. Right basilar chest tube in stable  position. Interval removal of left apical chest tube. Trachea is  midline. Cardiac mediastinal silhouette is stable. Stable left upper  lobe patchy atelectasis. Stable moderate loculated left pleural  effusion. No definite left pneumothorax. Stable right loculated small  hydropneumothorax.      Impression    IMPRESSION:   1. Interval removal of left apical chest tube.  2. Stable moderate left loculated pleural effusion.  3. Simple right loculated small hydropneumothorax.  4. Stable left upper lobe patchy consolidation/atelectasis. Better  evaluated on CT of 7/28/2022    I have personally reviewed the examination and initial interpretation  and I agree with the findings.    GABRIELLA SNELL MD         SYSTEM ID:  [id number]   XR Chest 2 Views    Narrative    PA and lateral chest    HISTORY: Lung transplant follow-up chest tubes    Present study: 8/4/2022    FINDINGS: Surgical changes bilateral lung transplant. Left axillary  PICC. Loculated pleural effusions on the left. Right basilar chest  tube. Small loculated effusions on the right.      Impression    IMPRESSION: No significant change in bilateral loculated pleural  effusions.    JOHANN MORAES MD         SYSTEM ID:  [id number]

## 2022-08-07 NOTE — PROGRESS NOTES
[facility]    Medicine Progress Note - Hospitalist Service, GOLD TEAM 10    Date of Admission:  6/28/2022    Assessment & Plan      Sofie Rodriguez is a 62 year old female admitted on 6/28/2022. She has PMH of end stage COPD s/p b/l lung transplant on 6/28/2022, HTN, HFpEF, h/o hepC, osteopenia, and former methamphetamine use, and she was transferred to [place] [id number] [facility] from MICU on 7/15/2022. She was admitted to the MICU on 7/13/20222 with prior hospital course complicated by left upper extremity acute limb ischemia s/p left radial thrombectomy on 7/1/2022. She was primarily treated for acute hypercapnic respiratory failure on intermittent BIPAP with worsening BACILIO while in the MICU. Breathing is improving, but patient has severely delayed gastric emptying found on NM study. PEGJ placed  Still awaiting chest tube removal and will discharge to ARU vs TCU when able.      Today's Plan:  -right chest tube out  -increased metoprolol a bit due to ongoing tachycardia  -worked with PT and found stairs to be quite challenging  -cycling tube feeds    End stage COPD s/p BSLT 6/28/2022  Acute hypercapnic hypoxic respiratory failure (improving)  EBV Viremia  likely 2/2 decreased central respiratory drive vs respiratory muscle weakness and some pulmonary edema / fluid Transplanted 6/28. formal SNIFF test was performed on 7/14 showed R hemidiaphragm palsy. Of note transplanted lungs were also considered small for body size and could contribute to decreased respiratory compensation for hypercarbia. VBG improving gradually  - oxycodone 5-10 mg q4h PRN  - encourage activity and getting up in bed; PT/OT participation  - encourage chest physiotherapy QID  - weaned off Bipap at night  Immunosuppression:  - Prednisone per pulm  - Tacrolimus per pulm  -  BID  Prophylaxis:  - CMV - Valgancyclovir  - Thrush - PO nysatin  - PJP - TMP-SMX; dapsone started 7/18 at 50mg qMWF     Severe  Gastroparesis - gastric emptying study 7/20  - gastric emptying study 7/20 showed delayed gastric emptying with retention of 95% of stomach contents after 4 hours;  - 7/27: PEGJ placed- tolerating tube feeds via J  Feeding regimen: J tube feeds continuous-- transitioned formula to non-renal formula as her kidneys have improved/needs more volume, appreciate RD assistance as well     Peptic ulcer at the pyloric junction with acute blood loss anemia in the setting of acute on chronic anemia-- had acute blood loss anemia after transplant-- had stabilized. Gradual hgb downtrend, then after inspection bronch her G tube was hooked back up to gravity and large brown/black output seen. EGD on 8/3 showed pyloric junction ulcer and swelling causing gastric outlet objection  -GI consult, appreciate assistance  -BID PPI switched to IV (was already on BID PPI PO)--- back to enteral on 8/5  -NPO strict due to outlet obstruction and poor motility with G tube venting for 7 days (through 8/9)  -okay to resume heparin drip 8/4- not bleeding on this as of 8/5-8/7  - hgb transfusion 8/3-- hgb stable  -change all meds to J tube admin  -J feeds okay    Pleural Effusion, Right and Left  Left chest tube removed 8/4, right chest tube remaining, appreciate CVTS assistance  - daily CXR  - RIGHT Chest Tube - removed 8/6  - LEFT Apical Fluid collection - IR targeting via CT guided pigtail placement--removed 8/4     Hypotension, resolved  H/o HTN  H/o HFpEF  Likely vasoplegia post operatively. Last echo 7/7 normal EF with good LV function. S/p hydrocortisone 7/6-7/9 and fludrocortisone 7/6-7/11 without significant improvement.  - off midorine 7/19  - Holding PTA lasix 20mg daily-- diuresis intermittently    BACILIO-- recurrent, improved 8/1-- likely due to supratherapeutic tacrolimus  Hypermagnesemia  Hyperphosphatemia  Metabolic alkalosis  Baseline renal function was normal prior to surgery. New onset renal failure after transplant, likely  multifactorial due to pre-renal hypotension, less likely nephrotoxic agents. Overall kidney function improving. Today, Cr fluctuating but BUN increasing, with unclear urine output (7/22).   - HD cath removed 7/22, trend metabolites  - 8/1: 500 ml NS per Renal recs- tolerated; held fluid/diuresis on 8/2  - 8/6 - gave a bit of fluid    C.diff - vanco started 7/12, course completed  -rechecking due to diarrhea (8/5)-- was negative on 8/6    Tachyarrthymia  Paroxysmal afib  Paroxysmal aflutter/AT  SVT vs afib.Had an occurrence during HD on 7/14. Had afib with RVR to 200s on 7/17. EP consult placed.asmyptomatic and stable during episodes. Aflutter episode (7/17) with transfer. Vagal maneuver responsive at time. No recent tachyarrhythmia episodes (7/22).   - Per EP: patient has had episodes of possible flutter (vs AT with 2:1 conduction) and afib with RVR  - heparin drip -- eventual plan to transition to DOAC after PEGJ placement and chest tubes resolved (CHADS-VASc score of 2)  - On telemetry  - On metoprolol tartrate 25mg BID-- increasing to 50 BID 8/7  - Discharge on ziopatch for 14 days with EP follow up in 1-2 months after     Stress-induced/steroid induced hyperglycemia with intermittent hypoglycemia (8/3)  Has been controlled on 13-15 units of glargine BID  - on 7 units BID  8/5 since she is tapering steroid and has been NPO  -- will continue to monitor her needs        Diet: NPO for Medical/Clinical Reasons Except for: Ice Chips  Adult Formula Drip Feeding: Continuous Osmolite 1.5; Jejunostomy; Goal Rate: 45; mL/hr; Medication - Feeding Tube Flush Frequency: At least 15-30 mL water before and after medication administration and with tube clogging; Amount to Send (Nutrition...    DVT Prophylaxis: heparin drip  Dong Catheter: Not present  Central Lines: PRESENT     Cardiac Monitoring: None  Code Status: Full Code      Disposition Plan   Expected Discharge Date: 08/09/2022    Destination: home  Discharge Comments:  TCU, lot's of delays  - Chest tubes still in place        The patient's care was discussed with the Patient, Patient's Family and pulm Consultant.    Gaby Malhotra MD  Hospitalist Service, GOLD TEAM 10  [facility]  Securely message with the Vocera Web Console (learn more here)  Text page via [facility] Paging/Directory   Please see signed in provider for up to date coverage information      Clinically Significant Risk Factors Present on Admission                      ______________________________________________________________________    Interval History   No fever/chills. Did have quite a bit of diarrhea overnight. No nausea or vomiting. Still low level abd pain. Worked with PT already and had tough time with stairs. Occasionally feels palpitations. No cough. Breathing feels comfortable.    Data reviewed today: I reviewed all medications, new labs and imaging results over the last 24 hours. I personally reviewed no images or EKG's today.    Physical Exam   Vital Signs: Temp: 98.4  F (36.9  C) Temp src: Oral BP: 121/64 Pulse: 111   Resp: 28 SpO2: 93 % O2 Device: None (Room air) Oxygen Delivery: 1 LPM  Weight: 140 lbs 3.2 oz  Constitutional: Awake, alert and in no distress. Sitting up comfortably in chair.  Head: Normocephalic. Atraumatic.   Cardiovascular: mildly tachy rate and regular rhythm. No murmurs, clicks, gallops or rubs. No edema  Respiratory: Clear to auscultation without wheezes or crackles. Normal respiratory rate and effort.   Gastrointestinal: Abdomen soft, mild tenderness throughout. Gastric output is minimal. Bowel sounds present.  Musculoskeletal: extremities normal- no gross deformities noted and normal muscle tone  Skin: no suspicious lesions, rashes, jaundice, or cyanosis   Psychiatric: mentation appears normal and affect normal/bright      Data   Recent Labs   Lab 08/07/22  2119 08/07/22  1658 08/07/22  1121 08/07/22  0842 08/06/22  0812  08/06/22  0646 08/05/22  0844 08/05/22  0551 08/04/22  1202 08/04/22  0902 08/03/22  0756 08/03/22  0633 08/01/22  1218 08/01/22  0832   WBC  --   --   --  8.4  --  8.2  --  7.4  --  6.9  --  5.4   < >  --    HGB  --   --   --  7.7*  --  7.8*  --  8.1*  --  8.4*  --  6.5*   < >  --    MCV  --   --   --  102*  --  101*  --  100  --  99  --  105*   < >  --    PLT  --   --   --  255  --  223  --  214  --  215  --  202   < >  --    INR  --   --   --   --   --   --   --   --   --   --   --  0.87  --   --    NA  --   --   --  140  --  140  --  141  --  140  --  140   < > 140   POTASSIUM  --   --   --  4.7  --  4.3  --  4.5  --  4.9  --  5.1   < > 4.2   CHLORIDE  --   --   --  96*  --  95*  --  97*  --  96*  --  94*   < > 91*   CO2  --   --   --  41*  --  41*  --  41*  --  41*  --  45*   < > 47*   BUN  --   --   --  56.9*  --  56.1*  --  58.6*  --  60.3*  --  73.5*   < > 80.2*   CR  --   --   --  1.13*  --  1.21*  --  1.23*  --  1.20*  --  1.39*   < > 1.67*   ANIONGAP  --   --   --  3*  --  4*  --  3*  --  3*  --  1*   < > 2*   ESTUARDO  --   --   --  9.1  --  9.0  --  9.0  --  9.4  --  9.2   < > 9.0   * 148* 205* 160*   < > 143*   < > 142*   < > 122*   < > 113*   < > 144*   ALBUMIN  --   --   --   --   --   --   --   --   --  2.8*  --   --   --  2.8*   PROTTOTAL  --   --   --   --   --   --   --   --   --  5.4*  --   --   --  5.3*   BILITOTAL  --   --   --   --   --   --   --   --   --  0.2  --   --   --  0.2   ALKPHOS  --   --   --   --   --   --   --   --   --  60  --   --   --  68   ALT  --   --   --   --   --   --   --   --   --  12  --   --   --  12   AST  --   --   --   --   --   --   --   --   --  22  --   --   --  22    < > = values in this interval not displayed.     No results found for this or any previous visit (from the past 24 hour(s)).  Medications     dextrose Stopped (07/15/22 1801)     heparin 850 Units/hr (08/07/22 1800)       acetaminophen  975 mg Per J Tube 2 times daily     acetylcysteine  2 mL  Nebulization BID     aspirin  81 mg Per J Tube Daily     calcium carbonate 600 mg-vitamin D 400 units  1 tablet Per J Tube BID w/meals     dapsone  50 mg Per J Tube Once per day on Mon Wed Fri     insulin aspart  1-12 Units Subcutaneous Q4H     insulin glargine  7 Units Subcutaneous BID     levalbuterol  1.25 mg Nebulization 2 times daily     metoprolol tartrate  50 mg Per Feeding Tube BID     multivitamin, therapeutic  1 tablet Per Feeding Tube Daily     mycophenolate  750 mg Per J Tube BID     nystatin   Topical BID     nystatin  1,000,000 Units Swish & Swallow 4x Daily     ondansetron  4 mg Oral Daily     pantoprazole  40 mg Per J Tube BID     predniSONE  12.5 mg Per J Tube QAM    And     predniSONE  10 mg Per J Tube QPM     protein modular  1 packet Per Feeding Tube Daily     sodium chloride (PF)  10 mL Intracatheter Q8H     sodium chloride (PF)  10 mL Intracatheter Q8H     sodium chloride (PF)  3 mL Intracatheter Q8H     tacrolimus  4 mg Per J Tube BID IS     valGANciclovir  450 mg Oral or Feeding Tube Daily

## 2022-08-07 NOTE — PLAN OF CARE
Problem: Plan of Care - These are the overarching goals to be used throughout the patient stay.    Goal: Plan of Care Review/Shift Note  Description: The Plan of Care Review/Shift note should be completed every shift.  The Outcome Evaluation is a brief statement about your assessment that the patient is improving, declining, or no change.  This information will be displayed automatically on your shift note.  Outcome: Ongoing, Progressing   Goal Outcome Evaluation:        Neuro: Alert and oriented x4. Follows commands. Calls appropriately.   Cardiac: ST. VSS. Afebrile.   Respiratory: 1 L nc while sleeping. Room air when awake.   GI/: Adequate urine output. BM x3.   Diet/appetite: TF.  Activity:  Up with 1.   Skin: No new deficits noted.     Plan: Continue with POC. Notify primary team with changes.

## 2022-08-07 NOTE — PROGRESS NOTES
CLINICAL NUTRITION SERVICES - BRIEF NOTE      Reason for RD note: Provider request for cycling feeds.    New Findings/Chart Review:  Nutrition support: Osmolite 1.5 Rayshawn @ goal of 45ml/hr (1080ml/day) + 1 pkt Prosource daily will provide: 1660 kcals (30 kcal/kg), 78 g PRO (1.4 g/kg), 822 ml free H20, 219 g CHO, and 0 g fiber daily.   -Tolerating    Enteral Access: PEG/J - feeds via J-tube    Interventions:  Spoke with provider over phone. Pt tolerating feeds, some abdominal pain thought to be r/t gastric ulcer.    Cycle TF: Osmolite 1.5 Rayshawn @ goal of 90 ml/hr x 12 hrs (8pm - 8am) + 1 pkt Prosource daily will provide: 1080 mL, 1660 kcals (30 kcal/kg), 78 g PRO (1.4 g/kg), 822 ml free H20, 219 g CHO, and 0 g fiber daily.   -8/7: Starting now from 45 mL/hr, advance rate by 15 mL q2-4h as tolerated to goal rate 90 mL/hr. Once reaches new goal rate 90 mL/hr, abide by cycled hours.    Future/Additional Recommendations:  Monitor tolerance to cycled feeds.    Nutrition will continue to follow per protocol.    Corina Butler RD, LD  Emergency Department/Weekend Pager: [phone]

## 2022-08-08 ENCOUNTER — APPOINTMENT (OUTPATIENT)
Dept: ULTRASOUND IMAGING | Facility: CLINIC | Age: 62
DRG: 007 | End: 2022-08-08
Attending: INTERNAL MEDICINE
Payer: MEDICARE

## 2022-08-08 ENCOUNTER — APPOINTMENT (OUTPATIENT)
Dept: PHYSICAL THERAPY | Facility: CLINIC | Age: 62
DRG: 007 | End: 2022-08-08
Attending: THORACIC SURGERY (CARDIOTHORACIC VASCULAR SURGERY)
Payer: MEDICARE

## 2022-08-08 ENCOUNTER — APPOINTMENT (OUTPATIENT)
Dept: GENERAL RADIOLOGY | Facility: CLINIC | Age: 62
DRG: 007 | End: 2022-08-08
Attending: PHYSICIAN ASSISTANT
Payer: MEDICARE

## 2022-08-08 ENCOUNTER — APPOINTMENT (OUTPATIENT)
Dept: GENERAL RADIOLOGY | Facility: CLINIC | Age: 62
DRG: 007 | End: 2022-08-08
Attending: STUDENT IN AN ORGANIZED HEALTH CARE EDUCATION/TRAINING PROGRAM
Payer: MEDICARE

## 2022-08-08 LAB
ABO/RH(D): NORMAL
ALBUMIN SERPL BCG-MCNC: 2.5 G/DL (ref 3.5–5.2)
ALP SERPL-CCNC: 861 U/L (ref 35–104)
ALT SERPL W P-5'-P-CCNC: 791 U/L (ref 10–35)
AMMONIA PLAS-SCNC: 23 UMOL/L (ref 11–51)
ANION GAP SERPL CALCULATED.3IONS-SCNC: 2 MMOL/L (ref 7–15)
ANTIBODY SCREEN: NEGATIVE
AST SERPL W P-5'-P-CCNC: 1143 U/L (ref 10–35)
BASOPHILS # BLD AUTO: 0 10E3/UL (ref 0–0.2)
BASOPHILS NFR BLD AUTO: 0 %
BILIRUB DIRECT SERPL-MCNC: 0.73 MG/DL (ref 0–0.3)
BILIRUB DIRECT SERPL-MCNC: 0.76 MG/DL (ref 0–0.3)
BILIRUB DIRECT SERPL-MCNC: ABNORMAL MG/DL
BILIRUB SERPL-MCNC: 0.9 MG/DL
BLD PROD TYP BPU: NORMAL
BLOOD COMPONENT TYPE: NORMAL
BUN SERPL-MCNC: 63.9 MG/DL (ref 8–23)
CALCIUM SERPL-MCNC: 8.9 MG/DL (ref 8.8–10.2)
CHLORIDE SERPL-SCNC: 99 MMOL/L (ref 98–107)
CODING SYSTEM: NORMAL
CREAT SERPL-MCNC: 1.23 MG/DL (ref 0.51–0.95)
CROSSMATCH: NORMAL
DEPRECATED HCO3 PLAS-SCNC: 41 MMOL/L (ref 22–29)
EOSINOPHIL # BLD AUTO: 0.1 10E3/UL (ref 0–0.7)
EOSINOPHIL NFR BLD AUTO: 2 %
ERYTHROCYTE [DISTWIDTH] IN BLOOD BY AUTOMATED COUNT: 18.6 % (ref 10–15)
GFR SERPL CREATININE-BSD FRML MDRD: 50 ML/MIN/1.73M2
GLUCOSE BLDC GLUCOMTR-MCNC: 127 MG/DL (ref 70–99)
GLUCOSE BLDC GLUCOMTR-MCNC: 138 MG/DL (ref 70–99)
GLUCOSE BLDC GLUCOMTR-MCNC: 146 MG/DL (ref 70–99)
GLUCOSE BLDC GLUCOMTR-MCNC: 153 MG/DL (ref 70–99)
GLUCOSE BLDC GLUCOMTR-MCNC: 184 MG/DL (ref 70–99)
GLUCOSE SERPL-MCNC: 116 MG/DL (ref 70–99)
H PYLORI AG STL QL IA: NEGATIVE
HCT VFR BLD AUTO: 21 % (ref 35–47)
HGB BLD-MCNC: 6.5 G/DL (ref 11.7–15.7)
HGB BLD-MCNC: 7.3 G/DL (ref 11.7–15.7)
IMM GRANULOCYTES # BLD: 0.2 10E3/UL
IMM GRANULOCYTES NFR BLD: 4 %
INR PPP: 1.07 (ref 0.85–1.15)
ISSUE DATE AND TIME: NORMAL
LACTATE SERPL-SCNC: 0.7 MMOL/L (ref 0.7–2)
LYMPHOCYTES # BLD AUTO: 0.2 10E3/UL (ref 0.8–5.3)
LYMPHOCYTES NFR BLD AUTO: 4 %
MAGNESIUM SERPL-MCNC: 2.5 MG/DL (ref 1.7–2.3)
MCH RBC QN AUTO: 31.3 PG (ref 26.5–33)
MCHC RBC AUTO-ENTMCNC: 31 G/DL (ref 31.5–36.5)
MCV RBC AUTO: 101 FL (ref 78–100)
MONOCYTES # BLD AUTO: 0.3 10E3/UL (ref 0–1.3)
MONOCYTES NFR BLD AUTO: 6 %
NEUTROPHILS # BLD AUTO: 3.8 10E3/UL (ref 1.6–8.3)
NEUTROPHILS NFR BLD AUTO: 84 %
NRBC # BLD AUTO: 0 10E3/UL
NRBC BLD AUTO-RTO: 0 /100
PHOSPHATE SERPL-MCNC: 3.2 MG/DL (ref 2.5–4.5)
PLATELET # BLD AUTO: 242 10E3/UL (ref 150–450)
POTASSIUM SERPL-SCNC: 5.5 MMOL/L (ref 3.4–5.3)
POTASSIUM SERPL-SCNC: 5.7 MMOL/L (ref 3.4–5.3)
POTASSIUM SERPL-SCNC: 6.1 MMOL/L (ref 3.4–5.3)
PROT SERPL-MCNC: 5 G/DL (ref 6.4–8.3)
RADIOLOGIST FLAGS: ABNORMAL
RBC # BLD AUTO: 2.08 10E6/UL (ref 3.8–5.2)
SODIUM SERPL-SCNC: 142 MMOL/L (ref 136–145)
SPECIMEN EXPIRATION DATE: NORMAL
UFH PPP CHRO-ACNC: 0.24 IU/ML
UFH PPP CHRO-ACNC: 0.6 IU/ML
UNIT ABO/RH: NORMAL
UNIT NUMBER: NORMAL
UNIT STATUS: NORMAL
UNIT TYPE ISBT: 5100
WBC # BLD AUTO: 4.6 10E3/UL (ref 4–11)

## 2022-08-08 PROCEDURE — 97110 THERAPEUTIC EXERCISES: CPT | Mod: GP

## 2022-08-08 PROCEDURE — 250N000012 HC RX MED GY IP 250 OP 636 PS 637: Performed by: INTERNAL MEDICINE

## 2022-08-08 PROCEDURE — 258N000001 HC RX 258: Performed by: HOSPITALIST

## 2022-08-08 PROCEDURE — 80053 COMPREHEN METABOLIC PANEL: CPT | Performed by: STUDENT IN AN ORGANIZED HEALTH CARE EDUCATION/TRAINING PROGRAM

## 2022-08-08 PROCEDURE — 85025 COMPLETE CBC W/AUTO DIFF WBC: CPT | Performed by: STUDENT IN AN ORGANIZED HEALTH CARE EDUCATION/TRAINING PROGRAM

## 2022-08-08 PROCEDURE — 250N000013 HC RX MED GY IP 250 OP 250 PS 637: Performed by: HOSPITALIST

## 2022-08-08 PROCEDURE — 250N000011 HC RX IP 250 OP 636: Performed by: HOSPITALIST

## 2022-08-08 PROCEDURE — 71046 X-RAY EXAM CHEST 2 VIEWS: CPT

## 2022-08-08 PROCEDURE — 999N000157 HC STATISTIC RCP TIME EA 10 MIN

## 2022-08-08 PROCEDURE — 86922 COMPATIBILITY TEST ANTIGLOB: CPT | Performed by: INTERNAL MEDICINE

## 2022-08-08 PROCEDURE — 94640 AIRWAY INHALATION TREATMENT: CPT

## 2022-08-08 PROCEDURE — 87522 HEPATITIS C REVRS TRNSCRPJ: CPT | Performed by: PHYSICIAN ASSISTANT

## 2022-08-08 PROCEDURE — 71046 X-RAY EXAM CHEST 2 VIEWS: CPT | Mod: 26 | Performed by: RADIOLOGY

## 2022-08-08 PROCEDURE — 94640 AIRWAY INHALATION TREATMENT: CPT | Mod: 76

## 2022-08-08 PROCEDURE — 82150 ASSAY OF AMYLASE: CPT | Performed by: INTERNAL MEDICINE

## 2022-08-08 PROCEDURE — 250N000013 HC RX MED GY IP 250 OP 250 PS 637: Performed by: STUDENT IN AN ORGANIZED HEALTH CARE EDUCATION/TRAINING PROGRAM

## 2022-08-08 PROCEDURE — 87799 DETECT AGENT NOS DNA QUANT: CPT | Performed by: PHYSICIAN ASSISTANT

## 2022-08-08 PROCEDURE — 74019 RADEX ABDOMEN 2 VIEWS: CPT | Mod: 26 | Performed by: RADIOLOGY

## 2022-08-08 PROCEDURE — 93975 VASCULAR STUDY: CPT | Mod: 26 | Performed by: RADIOLOGY

## 2022-08-08 PROCEDURE — 82248 BILIRUBIN DIRECT: CPT | Performed by: INTERNAL MEDICINE

## 2022-08-08 PROCEDURE — 84132 ASSAY OF SERUM POTASSIUM: CPT | Performed by: STUDENT IN AN ORGANIZED HEALTH CARE EDUCATION/TRAINING PROGRAM

## 2022-08-08 PROCEDURE — 93010 ELECTROCARDIOGRAM REPORT: CPT | Performed by: INTERNAL MEDICINE

## 2022-08-08 PROCEDURE — 85610 PROTHROMBIN TIME: CPT | Performed by: INTERNAL MEDICINE

## 2022-08-08 PROCEDURE — 83735 ASSAY OF MAGNESIUM: CPT | Performed by: STUDENT IN AN ORGANIZED HEALTH CARE EDUCATION/TRAINING PROGRAM

## 2022-08-08 PROCEDURE — 250N000011 HC RX IP 250 OP 636: Performed by: STUDENT IN AN ORGANIZED HEALTH CARE EDUCATION/TRAINING PROGRAM

## 2022-08-08 PROCEDURE — P9016 RBC LEUKOCYTES REDUCED: HCPCS | Performed by: INTERNAL MEDICINE

## 2022-08-08 PROCEDURE — 84132 ASSAY OF SERUM POTASSIUM: CPT | Performed by: INTERNAL MEDICINE

## 2022-08-08 PROCEDURE — 250N000013 HC RX MED GY IP 250 OP 250 PS 637: Performed by: NURSE PRACTITIONER

## 2022-08-08 PROCEDURE — 250N000013 HC RX MED GY IP 250 OP 250 PS 637: Performed by: INTERNAL MEDICINE

## 2022-08-08 PROCEDURE — 94668 MNPJ CHEST WALL SBSQ: CPT

## 2022-08-08 PROCEDURE — 36592 COLLECT BLOOD FROM PICC: CPT | Performed by: STUDENT IN AN ORGANIZED HEALTH CARE EDUCATION/TRAINING PROGRAM

## 2022-08-08 PROCEDURE — 250N000013 HC RX MED GY IP 250 OP 250 PS 637: Performed by: SURGERY

## 2022-08-08 PROCEDURE — 99207 PR NON-BILLABLE SERV PER CHARTING: CPT | Performed by: PHYSICIAN ASSISTANT

## 2022-08-08 PROCEDURE — 258N000001 HC RX 258: Performed by: STUDENT IN AN ORGANIZED HEALTH CARE EDUCATION/TRAINING PROGRAM

## 2022-08-08 PROCEDURE — 93975 VASCULAR STUDY: CPT

## 2022-08-08 PROCEDURE — 93005 ELECTROCARDIOGRAM TRACING: CPT

## 2022-08-08 PROCEDURE — 84100 ASSAY OF PHOSPHORUS: CPT | Performed by: STUDENT IN AN ORGANIZED HEALTH CARE EDUCATION/TRAINING PROGRAM

## 2022-08-08 PROCEDURE — 86901 BLOOD TYPING SEROLOGIC RH(D): CPT | Performed by: INTERNAL MEDICINE

## 2022-08-08 PROCEDURE — 85018 HEMOGLOBIN: CPT | Performed by: INTERNAL MEDICINE

## 2022-08-08 PROCEDURE — 86922 COMPATIBILITY TEST ANTIGLOB: CPT | Performed by: HOSPITALIST

## 2022-08-08 PROCEDURE — 36592 COLLECT BLOOD FROM PICC: CPT | Performed by: PHYSICIAN ASSISTANT

## 2022-08-08 PROCEDURE — 120N000002 HC R&B MED SURG/OB UMMC

## 2022-08-08 PROCEDURE — C9113 INJ PANTOPRAZOLE SODIUM, VIA: HCPCS | Performed by: STUDENT IN AN ORGANIZED HEALTH CARE EDUCATION/TRAINING PROGRAM

## 2022-08-08 PROCEDURE — 94667 MNPJ CHEST WALL 1ST: CPT

## 2022-08-08 PROCEDURE — 82140 ASSAY OF AMMONIA: CPT | Performed by: PHYSICIAN ASSISTANT

## 2022-08-08 PROCEDURE — 85520 HEPARIN ASSAY: CPT | Performed by: STUDENT IN AN ORGANIZED HEALTH CARE EDUCATION/TRAINING PROGRAM

## 2022-08-08 PROCEDURE — 99233 SBSQ HOSP IP/OBS HIGH 50: CPT | Performed by: INTERNAL MEDICINE

## 2022-08-08 PROCEDURE — 250N000009 HC RX 250: Performed by: NURSE PRACTITIONER

## 2022-08-08 PROCEDURE — 83690 ASSAY OF LIPASE: CPT | Performed by: INTERNAL MEDICINE

## 2022-08-08 PROCEDURE — 99233 SBSQ HOSP IP/OBS HIGH 50: CPT | Mod: FS | Performed by: PHYSICIAN ASSISTANT

## 2022-08-08 PROCEDURE — 83605 ASSAY OF LACTIC ACID: CPT | Performed by: PHYSICIAN ASSISTANT

## 2022-08-08 PROCEDURE — 250N000012 HC RX MED GY IP 250 OP 636 PS 637: Performed by: NURSE PRACTITIONER

## 2022-08-08 PROCEDURE — 74019 RADEX ABDOMEN 2 VIEWS: CPT

## 2022-08-08 PROCEDURE — 85520 HEPARIN ASSAY: CPT | Performed by: INTERNAL MEDICINE

## 2022-08-08 RX ORDER — DEXTROSE MONOHYDRATE 25 G/50ML
25 INJECTION, SOLUTION INTRAVENOUS ONCE
Status: COMPLETED | OUTPATIENT
Start: 2022-08-08 | End: 2022-08-08

## 2022-08-08 RX ORDER — NICOTINE POLACRILEX 4 MG
15-30 LOZENGE BUCCAL
Status: DISCONTINUED | OUTPATIENT
Start: 2022-08-08 | End: 2022-08-08

## 2022-08-08 RX ORDER — CALCIUM GLUCONATE 94 MG/ML
1 INJECTION, SOLUTION INTRAVENOUS ONCE
Status: COMPLETED | OUTPATIENT
Start: 2022-08-08 | End: 2022-08-08

## 2022-08-08 RX ORDER — DEXTROSE MONOHYDRATE 25 G/50ML
25-50 INJECTION, SOLUTION INTRAVENOUS
Status: DISCONTINUED | OUTPATIENT
Start: 2022-08-08 | End: 2022-08-08

## 2022-08-08 RX ADMIN — OXYCODONE HYDROCHLORIDE 10 MG: 5 TABLET ORAL at 10:55

## 2022-08-08 RX ADMIN — PREDNISONE 10 MG: 10 TABLET ORAL at 19:28

## 2022-08-08 RX ADMIN — ASPIRIN 81 MG CHEWABLE TABLET 81 MG: 81 TABLET CHEWABLE at 09:04

## 2022-08-08 RX ADMIN — HUMAN INSULIN 7 UNITS: 100 INJECTION, SOLUTION SUBCUTANEOUS at 23:41

## 2022-08-08 RX ADMIN — NYSTATIN 1000000 UNITS: 100000 SUSPENSION ORAL at 18:47

## 2022-08-08 RX ADMIN — VALGANCICLOVIR HYDROCHLORIDE 450 MG: 50 POWDER, FOR SOLUTION ORAL at 12:11

## 2022-08-08 RX ADMIN — NYSTATIN: 100000 CREAM TOPICAL at 09:19

## 2022-08-08 RX ADMIN — ACETYLCYSTEINE 2 ML: 200 INHALANT RESPIRATORY (INHALATION) at 08:30

## 2022-08-08 RX ADMIN — ONDANSETRON 4 MG: 4 TABLET, ORALLY DISINTEGRATING ORAL at 08:54

## 2022-08-08 RX ADMIN — Medication 40 MG: at 08:52

## 2022-08-08 RX ADMIN — OXYCODONE HYDROCHLORIDE 10 MG: 5 TABLET ORAL at 06:20

## 2022-08-08 RX ADMIN — TACROLIMUS 4 MG: 5 CAPSULE ORAL at 18:47

## 2022-08-08 RX ADMIN — DEXTROSE MONOHYDRATE 300 ML: 100 INJECTION, SOLUTION INTRAVENOUS at 23:41

## 2022-08-08 RX ADMIN — DEXTROSE MONOHYDRATE 300 ML: 100 INJECTION, SOLUTION INTRAVENOUS at 19:32

## 2022-08-08 RX ADMIN — PREDNISONE 12.5 MG: 2.5 TABLET ORAL at 08:55

## 2022-08-08 RX ADMIN — CALCIUM CARBONATE 600 MG (1,500 MG)-VITAMIN D3 400 UNIT TABLET 1 TABLET: at 09:04

## 2022-08-08 RX ADMIN — NYSTATIN 1000000 UNITS: 100000 SUSPENSION ORAL at 21:48

## 2022-08-08 RX ADMIN — THERA TABS 1 TABLET: TAB at 12:11

## 2022-08-08 RX ADMIN — INSULIN ASPART 1 UNITS: 100 INJECTION, SOLUTION INTRAVENOUS; SUBCUTANEOUS at 12:23

## 2022-08-08 RX ADMIN — ACETAMINOPHEN 975 MG: 325 TABLET, FILM COATED ORAL at 21:47

## 2022-08-08 RX ADMIN — HUMAN INSULIN 7 UNITS: 100 INJECTION, SOLUTION SUBCUTANEOUS at 19:31

## 2022-08-08 RX ADMIN — METOPROLOL TARTRATE 50 MG: 50 TABLET, FILM COATED ORAL at 08:53

## 2022-08-08 RX ADMIN — DAPSONE 50 MG: 25 TABLET ORAL at 12:11

## 2022-08-08 RX ADMIN — TACROLIMUS 4 MG: 5 CAPSULE ORAL at 08:55

## 2022-08-08 RX ADMIN — METOPROLOL TARTRATE 50 MG: 50 TABLET, FILM COATED ORAL at 19:29

## 2022-08-08 RX ADMIN — MYCOPHENOLATE MOFETIL 750 MG: 200 POWDER, FOR SUSPENSION ORAL at 19:31

## 2022-08-08 RX ADMIN — HYDROXYZINE HYDROCHLORIDE 25 MG: 25 TABLET ORAL at 15:35

## 2022-08-08 RX ADMIN — DEXTROSE MONOHYDRATE 25 G: 25 INJECTION, SOLUTION INTRAVENOUS at 19:31

## 2022-08-08 RX ADMIN — OXYCODONE HYDROCHLORIDE 10 MG: 5 TABLET ORAL at 15:35

## 2022-08-08 RX ADMIN — INSULIN ASPART 2 UNITS: 100 INJECTION, SOLUTION INTRAVENOUS; SUBCUTANEOUS at 00:50

## 2022-08-08 RX ADMIN — ACETYLCYSTEINE 2 ML: 200 INHALANT RESPIRATORY (INHALATION) at 20:42

## 2022-08-08 RX ADMIN — DEXTROSE MONOHYDRATE 25 G: 25 INJECTION, SOLUTION INTRAVENOUS at 23:41

## 2022-08-08 RX ADMIN — LEVALBUTEROL HYDROCHLORIDE 1.25 MG: 1.25 SOLUTION RESPIRATORY (INHALATION) at 20:42

## 2022-08-08 RX ADMIN — OXYCODONE HYDROCHLORIDE 10 MG: 5 TABLET ORAL at 00:50

## 2022-08-08 RX ADMIN — CALCIUM CARBONATE 600 MG (1,500 MG)-VITAMIN D3 400 UNIT TABLET 1 TABLET: at 18:47

## 2022-08-08 RX ADMIN — PANTOPRAZOLE SODIUM 40 MG: 40 INJECTION, POWDER, FOR SOLUTION INTRAVENOUS at 19:28

## 2022-08-08 RX ADMIN — NYSTATIN: 100000 CREAM TOPICAL at 19:32

## 2022-08-08 RX ADMIN — NYSTATIN 1000000 UNITS: 100000 SUSPENSION ORAL at 08:53

## 2022-08-08 RX ADMIN — LEVALBUTEROL HYDROCHLORIDE 1.25 MG: 1.25 SOLUTION RESPIRATORY (INHALATION) at 08:30

## 2022-08-08 RX ADMIN — MYCOPHENOLATE MOFETIL 750 MG: 200 POWDER, FOR SUSPENSION ORAL at 08:54

## 2022-08-08 RX ADMIN — CALCIUM GLUCONATE 1 G: 98 INJECTION, SOLUTION INTRAVENOUS at 19:30

## 2022-08-08 RX ADMIN — Medication 1 PACKET: at 12:13

## 2022-08-08 RX ADMIN — ACETAMINOPHEN 975 MG: 325 TABLET, FILM COATED ORAL at 08:53

## 2022-08-08 ASSESSMENT — ACTIVITIES OF DAILY LIVING (ADL)
ADLS_ACUITY_SCORE: 33

## 2022-08-08 NOTE — PHARMACY-TRANSPLANT NOTE
Solid Organ Transplant Recipient Prior to Discharge Note    62 year old female s/p Lung transplant on 6/28/22.    Pharmacy has monitored for medication interactions and immunosuppression levels in conjunction with the multidisciplinary team. In anticipation for discharge, medication therapy needs have been addressed daily throughout the current admission via multidisciplinary rounds and/or discussions, order verification, daily clinical pharmacy review, and communication with prescribers.

## 2022-08-08 NOTE — PLAN OF CARE
End of shift summary (1900-0730)-             Neuro  A&Ox4.  Cardiac Sinus Tach.    Respiratory 1 L N.C  GI/: SBA to commode  Diet/Appetite:NPO with ice chips & cyclic tube feedings [id number], goal of 90ml/hr but pt unable to tolerate. Tube feeding stopped @ 60 ml/hr & turned off due to extreme stomach cramping.  Activity:  SBA  Pain:  oxycodone 10mg PRN given 2X   Skin:  Blanchable redness on the perineum. Barrier cream applied after using commode  LDAs + Drips/IVF Left double lumen midline catheter. Heparin 850ml/hr. Will have recheck with morning labs  Protocols/Labs:   High Mag Phos & K protocol         Plan:  Continue to monitor & notify team of any changes

## 2022-08-08 NOTE — PROGRESS NOTES
Brief GI note:     Called by primary team due to concern for recurrent GI bleed. Patient with hemoglobin down to 6.5 from 7.7 in past 24 hours then started to have black stools this afternoon. Has had cramping diffuse abdominal pain since overnight when TF rate was increased. Also with new rise in AST and ALT, hepatology consulted for this.     Blood pressure stable. HR has been around 100 for past few days, now in 110s.   Sitting on commode, with smear of black stool in depends. Nonlabored breathing. Able to ambulate to bed with minimal assistance. Abdominal diffusely tender to palpation, no involuntary guarding or rebound tenderness. No focal tenderness around G-tube site and no surrounding erythema. Minimal output in G-tube bag with greenish black fluid.     Concern for recurrent upper GI bleed, possibly the previously seen ulcer at pyloric channel. Patient hemodynamically stable, no need for urgent endoscopy tonight. Patient agreeable to repeat upper endoscopy in AM if needed.     - plan for EGD in AM   - switch to IV PPI BID (currently getting suspension through J tube)  - hold TFs  - strict NPO to ensure stomach is empty for EGD tomorrow (discussed with patient and bedside nurse)   - hold anticoagulation.   - Contact on call GI team overnight with hemodynamic instability or significant bleeding.       Discussed with attending, Dr. oHdges,     Tenzin Arroyo MD  Gastroenterology Fellow - PGY4  [facility]   Page Me

## 2022-08-08 NOTE — PROGRESS NOTES
[facility]    Medicine Progress Note - Hospitalist Service, GOLD TEAM 10    Date of Admission:  6/28/2022    Assessment & Plan        Sofie Rodriguez is a 62 year old female admitted on 6/28/2022. She has PMH of end stage COPD s/p b/l lung transplant on 6/28/2022, HTN, HFpEF, h/o hepC, osteopenia, and former methamphetamine use, and she was transferred to [place] [id number] [facility] from MICU on 7/15/2022. She was admitted to the MICU on 7/13/20222 with prior hospital course complicated by left upper extremity acute limb ischemia s/p left radial thrombectomy on 7/1/2022. She was primarily treated for acute hypercapnic respiratory failure on intermittent BIPAP with worsening BACILIO while in the MICU. Breathing is improving, but patient has severely delayed gastric emptying found on NM study. PEGJ placed  Chest tubes out. Still needing to advance feeds     Today's Plan:  -transfusing due to hgb <7  - worsening abdominal last night after cycling her tube feeds  -stopped tube feeds for bowel rest  -abd xr and abd US pending (getting doppler due to elevated LFTs)  -in afternoon she started having dark tarry stools  --discussing with GI hepatology and GI luminal  --will get INR and direct bilirubin, recheck hgb  -holding long-acting insulin due to holding tube feeds  -holding heparin drip  -Planning on EGD on 8/9 AM    Acute Hepatitis  Dramatic rise in LFT's-- AST > ALT; ALT 700s, AST 1000s; bili rising 0.9 from 0.2. No fever. Differential includes Budd-Chiari (but was on heparin drip), medication induced, considered cholangitis. Viral possible but seems less likely. Shock liver possible but no known hypotensive episodes  -RUQ US with doppler looking for clot, looking for other etiologies  -hep C, INR, repeat CMP in AM  -Hepatology consulted, appreciate assistance  -tylenol only at BID    End stage COPD s/p BSLT 6/28/2022  Acute hypercapnic hypoxic respiratory failure (improving)  EBV  Viremia  likely 2/2 decreased central respiratory drive vs respiratory muscle weakness and some pulmonary edema / fluid Transplanted 6/28. formal SNIFF test was performed on 7/14 showed R hemidiaphragm palsy. Of note transplanted lungs were also considered small for body size and could contribute to decreased respiratory compensation for hypercarbia. VBG improving gradually  - oxycodone 5-10 mg q4h PRN  - encourage activity and getting up in bed; PT/OT participation  - encourage chest physiotherapy QID  - weaned off Bipap at night  Immunosuppression:  - Prednisone per pulm  - Tacrolimus per pulm  -  BID  Prophylaxis:  - CMV - Valgancyclovir  - Thrush - PO nysatin  - PJP - TMP-SMX; dapsone started 7/18 at 50mg qMWF     Severe Gastroparesis - gastric emptying study 7/20  - gastric emptying study 7/20 showed delayed gastric emptying with retention of 95% of stomach contents after 4 hours;  - 7/27: PEGJ placed- tolerating tube feeds via J  Feeding regimen: J tube feeds continuous-- transitioned formula to non-renal formula as her kidneys have improved/needs more volume, appreciate RD assistance as well-- holding feeds while dealing with abdominal pain, melena     Peptic ulcer at the pyloric junction with acute blood loss anemia in the setting of acute on chronic anemia-- had acute blood loss anemia after transplant-- had stabilized. Gradual hgb downtrend, then after inspection bronch her G tube was hooked back up to gravity and large brown/black output seen. EGD on 8/3 showed pyloric junction ulcer and swelling causing gastric outlet objection  -GI consult, appreciate assistance  -BID PPI switched to IV (was already on BID PPI PO)--- back to enteral on 8/5  -NPO strict due to outlet obstruction and poor motility with G tube venting for 7 days (through 8/9)  -okay to resume heparin drip 8/4- not bleeding on this as of 8/5-8/7- HOLDING due to new melena  -spoke to GI and will plan for probable EGD in AM 8/9  - hgb  transfusion 8/3-- hgb stable  -change all meds to J tube admin  -J feeds okay    Pleural Effusion, Right and Left  Left chest tube removed 8/4, right chest tube remaining, appreciate CVTS assistance  - daily CXR  - RIGHT Chest Tube - removed 8/6  - LEFT Apical Fluid collection - IR targeting via CT guided pigtail placement--removed 8/4     Hypotension, resolved  H/o HTN  H/o HFpEF  Likely vasoplegia post operatively. Last echo 7/7 normal EF with good LV function. S/p hydrocortisone 7/6-7/9 and fludrocortisone 7/6-7/11 without significant improvement.  - off midorine 7/19  - Holding PTA lasix 20mg daily-- diuresis intermittently    BACILIO-- recurrent, improved 8/1-- likely due to supratherapeutic tacrolimus  Hypermagnesemia  Hyperphosphatemia  Metabolic alkalosis  Baseline renal function was normal prior to surgery. New onset renal failure after transplant, likely multifactorial due to pre-renal hypotension, less likely nephrotoxic agents. Overall kidney function improving. Today, Cr fluctuating but BUN increasing, with unclear urine output (7/22).   - HD cath removed 7/22, trend metabolites  - 8/1: 500 ml NS per Renal recs- tolerated; held fluid/diuresis on 8/2  - 8/6 - gave a bit of fluid    C.diff - vanco started 7/12, course completed  -rechecking due to diarrhea (8/5)-- was negative on 8/6    Tachyarrthymia  Paroxysmal afib  Paroxysmal aflutter/AT  SVT vs afib.Had an occurrence during HD on 7/14. Had afib with RVR to 200s on 7/17. EP consult placed.asmyptomatic and stable during episodes. Aflutter episode (7/17) with transfer. Vagal maneuver responsive at time. No recent tachyarrhythmia episodes (7/22).   - Per EP: patient has had episodes of possible flutter (vs AT with 2:1 conduction) and afib with RVR  - heparin drip -- eventual plan to transition to DOAC after PEGJ placement and chest tubes resolved (CHADS-VASc score of 2)  --HOLDING heparin drip 8/8 due to melena  - On telemetry  - On metoprolol tartrate 25mg  BID-- increasing to 50 BID 8/7  - Discharge on ziopatch for 14 days with EP follow up in 1-2 months after     Stress-induced/steroid induced hyperglycemia with intermittent hypoglycemia (8/3)  Has been controlled on 13-15 units of glargine BID  - on 7 units BID  8/5 since she is tapering steroid and has been NPO-- holding long acting given NPO/holding tube feeds  -- will continue to monitor her needs        Diet: NPO for Medical/Clinical Reasons Except for: Ice Chips  Adult Formula Drip Feeding: Continuous Osmolite 1.5; Jejunostomy; Goal Rate: 90 mL/hr - 8/7: Starting now from 45 mL/hr, advance rate by 15 mL q2-4h as tolerated to goal rate 90 mL/hr. Once reaches new goal rate 90 mL/hr, abide by cycled hours.; m...    DVT Prophylaxis: SCDs  Dong Catheter: Not present  Central Lines: PRESENT     Cardiac Monitoring: None  Code Status: Full Code      Disposition Plan      Expected Discharge Date: 08/11/2022    Discharge Delays: Other (Add Comment)  Destination: home  Discharge Comments: TCU,        The patient's care was discussed with the Bedside Nurse, Patient and pulm, GI luminal and GI hepatology and nutrition Consultant.    Gaby Malhotra MD  Hospitalist Service, GOLD TEAM [address] [place] [address]  Securely message with the Vocera Web Console (learn more here)  Text page via [facility] Paging/Directory   Please see signed in provider for up to date coverage information      Clinically Significant Risk Factors Present on Admission                      ______________________________________________________________________    Interval History   No fever/chills. Had a rough night-- said had abrupt abdominal pain in the evening. Seemed to coincide with going up on the tube feeds overnight. Felt severe throughout the abdomen and seemed to improve after stopping the feeds but still hasn't completely resolved. She hasn't noticed increased output from the G tube. Stools have been diarrhea  and then slowed down-- this afternoon she started with dark tarry stools and crampy abd pain.  No vomiting but did feel nauseated last night. Breathing feels comfortable    Data reviewed today: I reviewed all medications, new labs and imaging results over the last 24 hours. I personally reviewed the chest x-ray image(s) showing chest tubes are out; post surgical changes.    Physical Exam   Vital Signs: Temp: 98.7  F (37.1  C) Temp src: Oral BP: 108/50 Pulse: 105   Resp: 18 SpO2: 100 % O2 Device: Nasal cannula Oxygen Delivery: 1 LPM  Weight: 149 lbs 9.6 oz  Constitutional: Resting in bed, appears mildly uncomfortable.  Head: Normocephalic. Atraumatic.   EENT: No conjunctival injection or icterus. Nares patent. Moist mucous membranes.   Cardiovascular: Regular rate and rhythm. No murmurs, clicks, gallops or rubs. No edema  Respiratory: Clear to auscultation without wheezes or crackles. Normal respiratory rate and effort.   Gastrointestinal: Abdomen diffusely tender throughout. Bowel sounds active. G tube with very little output - some dark brown flakes. No tube feeds running.   Musculoskeletal: extremities normal- no gross deformities noted and normal muscle tone  Skin: no suspicious lesions, rashes, jaundice, or cyanosis   Psychiatric: mentation appears normal and affect normal/bright      Data   Recent Labs   Lab 08/08/22  1222 08/08/22  0916 08/08/22  0740 08/07/22  1121 08/07/22  0842 08/06/22  0812 08/06/22  0646 08/04/22  1202 08/04/22  0902 08/03/22  0756 08/03/22  0633   WBC  --   --  4.6  --  8.4  --  8.2   < > 6.9  --  5.4   HGB  --   --  6.5*  --  7.7*  --  7.8*   < > 8.4*  --  6.5*   MCV  --   --  101*  --  102*  --  101*   < > 99  --  105*   PLT  --   --  242  --  255  --  223   < > 215  --  202   INR  --   --   --   --   --   --   --   --   --   --  0.87   NA  --   --  142  --  140  --  140   < > 140  --  140   POTASSIUM  --   --  5.5*  --  4.7  --  4.3   < > 4.9  --  5.1   CHLORIDE  --   --  99  --  96*   --  95*   < > 96*  --  94*   CO2  --   --  41*  --  41*  --  41*   < > 41*  --  45*   BUN  --   --  63.9*  --  56.9*  --  56.1*   < > 60.3*  --  73.5*   CR  --   --  1.23*  --  1.13*  --  1.21*   < > 1.20*  --  1.39*   ANIONGAP  --   --  2*  --  3*  --  4*   < > 3*  --  1*   ESTUARDO  --   --  8.9  --  9.1  --  9.0   < > 9.4  --  9.2   * 127* 116*   < > 160*   < > 143*   < > 122*   < > 113*   ALBUMIN  --   --  2.5*  --   --   --   --   --  2.8*  --   --    PROTTOTAL  --   --  5.0*  --   --   --   --   --  5.4*  --   --    BILITOTAL  --   --  0.9  --   --   --   --   --  0.2  --   --    ALKPHOS  --   --  861*  --   --   --   --   --  60  --   --    ALT  --   --  791*  --   --   --   --   --  12  --   --    AST  --   --  1,143*  --   --   --   --   --  22  --   --     < > = values in this interval not displayed.     Recent Results (from the past 24 hour(s))   XR Chest 2 Views    Narrative    Exam: XR CHEST 2 VIEWS, 8/8/2022 10:30 AM    Indication: interval follow up, lung transplant, bilateral chest tubes    Comparison: 8/6/2022    Findings:   Left upper extremity vascular catheter tip projects over the left  subclavian vein. Surgical changes of bilateral lung transplantation  with clamshell sternotomy wires and mediastinal surgical clips. Stable  bilateral loculated pleural effusions. No appreciable pneumothorax.  Stable streaky perihilar opacities.      Impression    Impression: Surgical changes of bilateral lung transplantation with  stable bilateral loculated pleural effusions.    CECI REGAN DO         SYSTEM ID:  [id number]     Medications     dextrose Stopped (07/15/22 1801)     [Held by provider] heparin 1,000 Units/hr (08/08/22 0808)       acetaminophen  975 mg Per J Tube 2 times daily     acetylcysteine  2 mL Nebulization BID     aspirin  81 mg Per J Tube Daily     calcium carbonate 600 mg-vitamin D 400 units  1 tablet Per J Tube BID w/meals     dapsone  50 mg Per J Tube Once per day on Mon Wed Fri      insulin aspart  1-12 Units Subcutaneous Q4H     [Held by provider] insulin glargine  7 Units Subcutaneous BID     levalbuterol  1.25 mg Nebulization 2 times daily     metoprolol tartrate  50 mg Per Feeding Tube BID     multivitamin, therapeutic  1 tablet Per Feeding Tube Daily     mycophenolate  750 mg Per J Tube BID     nystatin   Topical BID     nystatin  1,000,000 Units Swish & Swallow 4x Daily     ondansetron  4 mg Oral Daily     pantoprazole  40 mg Per J Tube BID     predniSONE  12.5 mg Per J Tube QAM    And     predniSONE  10 mg Per J Tube QPM     protein modular  1 packet Per Feeding Tube Daily     sodium chloride (PF)  10 mL Intracatheter Q8H     sodium chloride (PF)  10 mL Intracatheter Q8H     sodium chloride (PF)  3 mL Intracatheter Q8H     tacrolimus  4 mg Per J Tube BID IS     valGANciclovir  450 mg Oral or Feeding Tube Daily

## 2022-08-08 NOTE — PROGRESS NOTES
Pulmonary Medicine  Cystic Fibrosis - Lung Transplant Team  Progress Note  2022       Patient: Sofie Rodriguez  MRN: [id number]  : 1960 (age 62 year old)  Transplant: 2022 (Lung), POD#41  Admission date: 2022    Assessment & Plan:     Sofie Rodriguez is a 62 year old female with a PMH significant for end-stage COPD, HTN, HFpEF, Mycobacterium peregrinum colonization, h/o hepatitis C, HECTOR s/p LEEP procedure, osteopenia, and former methamphetamine use.  Pt. is now s/p BSLT on 22, lungs slightly undersized.   Persistent low dose pressor needs post-op through 7/10.  Extubated POD #2 but with persistent hypercapnia, mostly compensated and only slightly improved with intermittent BiPAP, discontinued 8/3.  Also with left radial artery thrombus (presumed secondary to arterial line) s/p thrombectomy , BACILIO, C diff, gastroparesis s/p GJ tube placement in IR , and coffee ground G tube output s/p EGD 8/3 with pyloric ulcer.      Today's recommendations:  - DSA, IgG, and EBV due  (not yet ordered)  - Airway clearance with chest physiotherapy and nebs BID for now  - CXR as below  - Wean supplemental oxygen as able, exercise and overnight oximetry should be ordered prior to discharge  - Will need BLE US prior to discharge (screening for thrombus)  - Tacrolimus level ordered tomorrow  - Prednisone taper due  (not yet ordered)  - Low potassium TF formula  - Agree with AXR given increased abdominal pain/cramping (noted with cycled TF), likely plan to transition back to continuous TF, continue strict NPO (except ice chips and sips)  - Repeat potassium and hemoglobin later today     S/p bilateral sequential lung transplant (BSLT) for end stage COPD:  Persistent hypercapneic respiratory failure:   Persistent hypotension, Resolved:   Bilateral hydroPTX:  Right hemidiaphragm palsy: Explant pathology with severe emphysema with subpleural bullae formation, changes of chronic bronchitis, subpleural  scars and patchy pulmonary edema; benign hilar lymph nodes.  Extubated 6/30.  Persistent hypercapnia without dyspnea, appears to be a respiratory drive problem.  Sniff (7/14) notable for right hemidiaphragm palsy.  Bronch (7/19) with slight graying of mucosa noted at right anastomosis and scant secretions (slightly increased in RLL).  Chest CT (7/23) with increased loculated fluid within the left hemithorax with specks of air density in the loculated fluid, similar appearing loculated right hydroPTX with minimal decrease in fluid component, increased size of pleural based lesion in MARLON, and mild subcutaneous emphysema along right chest tube with minimal along tract of removed left chest tube.  S/p left apical chest tube 7/25-8/4, right surgical tube removed 8/6.  Bronch (8/2) with normal inspection of anastomoses.  NIPPV initially overnight for persistent hypercapnia, stopped 8/3 given lack of improvement with therapy, compensated hypercapnia persists.  On RA-1L NC.   - Repeat DSA in one month (8/28, not yet ordered)  - Nebs: levalbuterol and Mucomyst BID   - Pulmonary toilet with chest physiotherapy BID, consider discontinuing in the next 1-2 days  - Aerobika and incentive spirometry hourly while awake  - CXR daily, today with stable bilateral loculated pleural effusions (personally reviewed)  - Supplemental oxygen as needed to maintain SpO2 >92% (wean as able), exercise and overnight oximetry should be ordered prior to discharge   - Will need BLE US prior to discharge (screening for thrombus)     Immunosuppression:  Induction therapy with basiliximab (and high dose IV steroid).  - Tacrolimus 4 mg BID (via J tube).  Goal level 8-12.  Repeat level 8/9 (ordered).  -  mg BID (increased 8/7, prior decrease for GI symptoms/leukopenia)   - Prednisone 12.5 mg qAM / 10 mg qPM, next taper due 8/18 (not yet ordered)  Date AM dose (mg) PM dose (mg)   8/4/22 12.5 10   8/18/22 10 10   9/15/22 10 7.5   10/13/22 7.5 7.5    11/10/22 7.5 5   12/8/22 5 5   1/5/23 5 2.5      Prophylaxis:   - Dapsone qMWF for PJP ppx (7/18)  - VGCV for CMV ppx, CMV negative 7/25  - Nystatin for oral candidiasis ppx, 6 month course  - See below for serologies and viral ppx:    Donor Recipient Intervention   CMV status Positive Positive Valganciclovir POD #8-90   EBV status Positive Positive EBV monitoring as below   HSV status N/A Positive Not indicated      ID: Donor bronch cultures (OSH) with Strep beta hemolytic (not group A).     H/o M. peregrinum colonization: NGTD post-transplant.  - AFB to be sent on all future bronchs     Streptococcus pneumoniae:  Stenotrophomonas maltophilia: Noted in recipient cultures at time of transplant.  S/p ceftazidime 6/28-7/10, vancomycin 7/7-7/8 and 6/28-6/30, and levofloxacin 7/10-7/12 for total 2 week ABX course.     Hypogammaglobulinemia: IgG adequate at time of transplant, repeat level low (336) on 7/28.  S/p IVIG dosing with premedication 7/30, tolerated well.  - Repeat IgG in one month (8/28, not yet ordered)     H/o hepatitis C:  Diagnosed in 1980s s/p 2m treatment, quant negative since 10/2017, last positive 2/20/17 ([phone]).  Ab positive on 6/2021 with negative HCV PCR.  H/o remote EtOH abuse.  MR elastography (4/27/21) with hepatology review and consult without any concerns post transplant.  Hepatitis C RNA negative and hepatitis C antibody positive (old) on 6/28.     EBV viremia: EBV level 11k, log 4.1 on 7/28.  Not likely to be clinically significant.  - EBV monthly monitoring (8/28, not yet ordered)     Other relevant problems managed by primary team:      SVT:   Aflutter with RVR: SVT first noted on 7/14, prior to HD line placement.  Continues intermittently.  Aflutter with RVR to 200 7/17 triggered by activity.  EP consulted 7/17 given persistent tachycardia/dysrhythmia.  Midodrine held 7/19 and since discontinued.  - AC and metoprolol per primary team     Left hand ischemia: Radial artery thrombosis  identified on duplex doppler.  S/p thrombectomy on 7/2.   - AC per primary team as above      BACILIO:   Hyperkalemia: Likely multifactorial including medications (Bactrim, tacrolimus) and hypotension.  Fludrocortisone 7/6-7/11.  Potassium had been stable, now elevated to 5.5 on 8/8.  S/p non-tunneled HD line 7/14.  Initial iHD run 7/14 limited by afib with RVR vs SVT.  Last iHD run 7/16, line removed 7/22.  Creatinine increased since 7/29 with Diamox and elevated tacrolimus level.  - Tacrolimus monitoring as above  - Low potassium TF formula  - Management per primary team     Severe gastroparesis:   Pyloric ulcer: Cramping abdominal pain 7/15.  AXR with large stool burden in colon, no obstruction or distention.  Some nausea but no emesis.  CT abdomen (7/15) with moderate to large gastric distention, otherwise without obstruction.  NG placed for LIS with moderate to large output for several days.  Increased abdominal pain 7/17 after clamping for 4 hours.  Gastric emptying study (7/20) with severe gastric emptying delay (95% retention at 4 hours).  S/p GJ tube placement in IR 7/27.  S/p EGD 8/3 for coffee ground G tube output, noted to have pyloric ulcer and excessive gastric fluid and residual food.  Increased abdominal pain/cramping with trial of cycled TF 8/7 to 8/8.  - PPI BID  - Simethicone prn  - TF via J tube, management per RD and primary team  - G tube to gravity drainage   - Strict NPO except for ice chips and sips  - Agree with AXR given increased abdominal pain/cramping     C diff infection: Abdominal pain with diarrhea noted 7/8, AXR without dilated bowel, moderate colonic stool burden.  C diff positive 7/11.  Loose stools (on tube feeds) stable.  S/p PO vancomycin (7/11-7/28).  Repeat C diff negative 8/6.  - Low threshold to resume vancomycin in the future with ABX course     Hypomagnesemia: Suppressed absorption d/t CNI.   - Continue daily magnesium with replacement protocol prn, schedule oral magnesium  "supplementation if needed prior to discharge    We appreciate the excellent care provided by the [place] [id number] team.  Recommendations communicated via in person rounding and this note.  Will continue to follow along closely, please do not hesitate to call with any questions or concerns.    Patient discussed with Dr. Castillo.    Yuliet Aviles PA-C  [facility]patient [facility]  Pulmonary CF/Transplant     Subjective & Interval History:     On RA for majority of the day yesterday, 1L NC overnight and prn.  Denies dyspnea.  Tachycardic at times.  Cough productive of clear sputum, received chest physiotherapy once yesterday.  Having some dizziness when first getting up.  Increased abdominal pain/cramping overnight, no significant change with decreasing cycled TF rate although somewhat improved once TF stopped.  Intermittent nausea, no vomiting.  Increased frequency of stools yesterday, no report of dark or bloody stools.  Hemoglobin 6.5 this morning.      Review of Systems:     C: + low grade temp, no chills, + increased weight  INTEGUMENTARY/SKIN: No rash or obvious new lesions  ENT/MOUTH: No sore throat, no sinus pain, no nasal congestion or drainage  RESP: See interval history  CV: No chest pain, no palpitations, no peripheral edema  GI: See interval history  : No dysuria  MUSCULOSKELETAL: No myalgias, no arthralgias  ENDOCRINE: + blood sugars intermittently elevated  NEURO: No headache, no numbness or tingling  PSYCHIATRIC: Mood stable    Physical Exam:     All notes, images, and labs from past 24 hours (at minimum) were reviewed.    Vital signs:  Temp: 98.6  F (37  C) Temp src: Oral BP: 109/52 Pulse: 84   Resp: 13 SpO2: 99 % O2 Device: Nasal cannula Oxygen Delivery: 1 LPM Height: 157.5 cm (5' 2\") Weight: 67.9 kg (149 lb 9.6 oz)  I/O:     Intake/Output Summary (Last 24 hours) at 8/8/2022 1441  Last data filed at 8/8/2022 0658  Gross per 24 hour   Intake 966 ml   Output 1150 ml   Net -184 ml     Constitutional: Lying in bed, " in no apparent distress.   HEENT: Eyes with pink conjunctivae, anicteric.  Oral mucosa moist without lesions.   PULM: Mildly diminished air flow to bases bilaterally.  No crackles, no rhonchi, no wheezes.  Non-labored breathing on 1L NC.  CV: Normal S1 and S2.  RRR.  No murmur, gallop, or rub.  No peripheral edema.   ABD: NABS, soft, + tender t/o, nondistended.    MSK: Moves all extremities.    NEURO: Alert, conversant.   SKIN: Warm, dry.  No rash on limited exam.  Clamshell incision not visualized.  PSYCH: Mood stable.     Lines, Drains, and Devices:  Midline Catheter Double Lumen (Active)   Site Assessment WDL 08/08/22 0400   External Cath Length (cm) 2 cm 08/04/22 1244   Initial Extremity Circumference (cm) 29 cm 07/28/22 1455   Dressing Intervention Chlorhexidine patch;Transparent 08/08/22 0400   Line Necessity Yes, meets criteria 08/08/22 0400   Dressing Change Due 08/11/22 08/08/22 0400   Purple - Status infusing 08/08/22 0400   Purple - Cap Change Due 08/10/22 08/08/22 0400   Red - Status saline locked 08/08/22 0400   Red - Cap Change Due 08/10/22 08/08/22 0400   Extravasation? No 08/08/22 0400   Number of days: 11     Data:     LABS    CMP:   Recent Labs   Lab 08/08/22  1222 08/08/22  0916 08/08/22  0740 08/08/22  0023 08/07/22  1121 08/07/22  0842 08/06/22  0812 08/06/22  0646 08/05/22  0844 08/05/22  0551 08/04/22  1202 08/04/22  0902   NA  --   --  142  --   --  140  --  140  --  141  --  140   POTASSIUM  --   --  5.5*  --   --  4.7  --  4.3  --  4.5  --  4.9   CHLORIDE  --   --  99  --   --  96*  --  95*  --  97*  --  96*   CO2  --   --  41*  --   --  41*  --  41*  --  41*  --  41*   ANIONGAP  --   --  2*  --   --  3*  --  4*  --  3*  --  3*   * 127* 116* 184*   < > 160*   < > 143*   < > 142*   < > 122*   BUN  --   --  63.9*  --   --  56.9*  --  56.1*  --  58.6*  --  60.3*   CR  --   --  1.23*  --   --  1.13*  --  1.21*  --  1.23*  --  1.20*   GFRESTIMATED  --   --  50*  --   --  55*  --  51*  --   50*  --  52*   ESTUARDO  --   --  8.9  --   --  9.1  --  9.0  --  9.0  --  9.4   MAG  --   --  2.5*  --   --  2.5*  --  2.6*  --  2.3  --  2.4*   PHOS  --   --  3.2  --   --  3.0  --  3.9  --  3.8  --  3.1   PROTTOTAL  --   --   --   --   --   --   --   --   --   --   --  5.4*   ALBUMIN  --   --   --   --   --   --   --   --   --   --   --  2.8*   BILITOTAL  --   --   --   --   --   --   --   --   --   --   --  0.2   ALKPHOS  --   --   --   --   --   --   --   --   --   --   --  60   AST  --   --   --   --   --   --   --   --   --   --   --  22   ALT  --   --   --   --   --   --   --   --   --   --   --  12    < > = values in this interval not displayed.     CBC:   Recent Labs   Lab 08/08/22  0740 08/07/22  0842 08/06/22  0646 08/05/22  0551   WBC 4.6 8.4 8.2 7.4   RBC 2.08* 2.56* 2.60* 2.69*   HGB 6.5* 7.7* 7.8* 8.1*   HCT 21.0* 26.2* 26.3* 26.9*   * 102* 101* 100   MCH 31.3 30.1 30.0 30.1   MCHC 31.0* 29.4* 29.7* 30.1*   RDW 18.6* 18.3* 18.3* 19.1*    255 223 214       INR:   Recent Labs   Lab 08/03/22  0633   INR 0.87       Glucose:   Recent Labs   Lab 08/08/22  1222 08/08/22  0916 08/08/22  0740 08/08/22  0023 08/07/22  2119 08/07/22  1658   * 127* 116* 184* 138* 148*       Blood Gas:   Recent Labs   Lab 08/04/22  0902 08/03/22  0633 08/02/22  0757   PHV 7.42 7.39 7.36   PCO2V 71* 78* 81*   PO2V 31 29 38   HCO3V 46* 47* 46*   LINDY 18.7* 20.4* 17.4*   O2PER 2 35 35       Culture Data No results for input(s): CULT in the last 168 hours.    Virology Data:   Lab Results   Component Value Date    FLUAH1 Not Detected 06/29/2022    FLUAH3 Not Detected 06/29/2022    CI4488 Not Detected 06/29/2022    IFLUB Not Detected 06/29/2022    RSVA Not Detected 06/29/2022    RSVB Not Detected 06/29/2022    PIV1 Not Detected 06/29/2022    PIV2 Not Detected 06/29/2022    PIV3 Not Detected 06/29/2022    HMPV Not Detected 06/29/2022       Historical CMV results (last 3 of prior testing):  Lab Results   Component Value  Date    CMVQNT Not Detected 07/25/2022     No results found for: CMVLOG    Urine Studies    Recent Labs   Lab Test 08/01/22  0319 07/08/22  0831 07/05/22  1004   URINEPH 8.0* 5.5 5.5   NITRITE Negative Negative Negative   LEUKEST Negative Negative Negative   WBCU  --  1 5       Most Recent [facility] Pulmonary Function Testing (FVC/FEV1 only)  FVC-Pre   Date Value Ref Range Status   04/29/2022 1.82 L    11/11/2021 2.17 L    06/14/2021 2.00 L      FVC-%Pred-Pre   Date Value Ref Range Status   04/29/2022 58 %    11/11/2021 70 %    06/14/2021 64 %      FEV1-Pre   Date Value Ref Range Status   04/29/2022 0.51 L    11/11/2021 0.53 L    06/14/2021 0.54 L      FEV1-%Pred-Pre   Date Value Ref Range Status   04/29/2022 20 %    11/11/2021 21 %    06/14/2021 21 %        IMAGING    Recent Results (from the past 48 hour(s))   XR Chest 2 Views    Narrative    Exam: XR CHEST 2 VIEWS, 8/8/2022 10:30 AM    Indication: interval follow up, lung transplant, bilateral chest tubes    Comparison: 8/6/2022    Findings:   Left upper extremity vascular catheter tip projects over the left  subclavian vein. Surgical changes of bilateral lung transplantation  with clamshell sternotomy wires and mediastinal surgical clips. Stable  bilateral loculated pleural effusions. No appreciable pneumothorax.  Stable streaky perihilar opacities.      Impression    Impression: Surgical changes of bilateral lung transplantation with  stable bilateral loculated pleural effusions.    CECI REGAN DO         SYSTEM ID:  [id number]

## 2022-08-08 NOTE — CONSULTS
"Patient is a 62 year old female admitted on 6/28/2022. She has PMH of end stage COPD s/p b/l lung transplant on 6/28/2022, HTN, HFpEF, h/o hepC, osteopenia, and former methamphetamine use, and she was transferred to [address] from MICU on 7/15/2022. She was admitted to the MICU on 7/13/20222 with prior hospital course complicated by left upper extremity acute limb ischemia s/p left radial thrombectomy on 7/1/2022. She was primarily treated for acute hypercapnic respiratory failure on intermittent BIPAP with worsening BACILIO while in the MICU. Breathing is improving, but patient has severely delayed gastric emptying found on NM study. PEGJ placed.    Consult placed for insulin and meter teaching. Met with patient at bedside, provided the Travis Contour next EZ meter. Educated patient on how to use meter, lacing device and how to interpret readings according to the sliding scale insulin chart . Patient performed BG check with meter, 158mg/dL. Patient used sliding scale to determine correct amount of short acting insulin required for the BG.      Discussed hypoglycemia management, provided patient with educational booklet, \"Understanding Diabetes\" which reviews symptoms and rule of 15. Patient verbalized understanding in regards to managing her BG and monitoring.       Educated patient on both long acting and short acting insulin- storage, doses, uses and action times. Demonstrated how to administer both long and short acting insulin via pen and BD 4mm sandra needle. Patient returned the demonstration with no issues.     Patient is aware that sliding scale insulin and Lantus/Basaglar doses may change as patient nears discharge.     Patient reported feeling confident with all the information that was provided, no questions or concerns at this time.     -Ananya Orr MSN, RN PPNL -Diabetes *[phone]        "

## 2022-08-08 NOTE — PROGRESS NOTES
"CLINICAL NUTRITION SERVICES - BRIEF NOTE     Nutrition Prescription    Recommendations already ordered by Registered Dietitian (RD):  Change back to continuous rate and renal formula: Novasource Renal @ 35 ml/hr (840 ml) + 1 pkt Prosource daily provides 1720 kcal (30 kcal/kg), 87 g pro (1.5 g/kg), 154 g CHO, 602 ml free water, and 0 g fiber daily     Future/Additional Recommendations:  Monitor tolerance of EN and lytes   See RD note from 8/3 for full assessment      NEW FINDINGS   Current Nutirtion Support: Osmolite 1.5 Rayshawn @ goal of 90 ml/hr x 12 hrs (8pm - 8am) + 1 pkt Prosource daily will provide  Pt did not tolerate cycled TF overnight, per RN note, \"Tube feeding stopped @ 60 ml/hr & turned off due to extreme stomach cramping.\"    Labs: K+ 5.5 (H)    INTERVENTIONS  Implementation  Collaboration with other providers - Discussed changes with provider  Enteral Nutrition - See above    Monitoring/Evaluation  Will continue to monitor and evaluate per protocol.    Loida Norris RD, LD  6D RD pager [phone]  Weekend/ED RD pager [phone]    "

## 2022-08-08 NOTE — PROGRESS NOTES
Cardiovascular Surgery Progress Note  08/08/2022         Assessment and Plan:     Sofie Rodriguez is a 62 year old female with PMH significant for oxygen-dependent COPD, HF, HTN, HCV, osteopenia, and methamphetamine abuse in remission.    She is now s/p BSLT by Dr. Sunshine on 6/28/2022.  Her post-operative course has been complicated by LUE critical limb ischemia now s/p left radial thrombectomy on 7/1/2022, hypercapneic respiratory insufficiency, BACILIO, and dysphagia.      - Chest tube removed 8/6. CXR 8/7 stable.   - IR placed left apical pigtail chest tube on 7/25. Discussed with pulmonary, no drainage from left pigtail and so removed 8/4.   - Per surgeon, lungs did not fill her chest space. She has stable apical pleural effusions.  - Daily wound care per nursing:  Wound cleanser to clamshell incision, pat dry, may be left open to air; keep incision C/D/I  - Remainder of plan per Pulm Transplant/Medicine teams  - Sternal precautions placed in discharge instructions.   - BID Chest tube sites dressing changes with dry gauze only (no primapore).      Surgery Signing Off. Please call if Q's.   Discussed with Dr Sunshine through both written and verbal communication.      Andres Wilson PA-C  Cardiothoracic Surgery  Pager [phone]    7:38 AM   August 8, 2022

## 2022-08-09 ENCOUNTER — APPOINTMENT (OUTPATIENT)
Dept: MRI IMAGING | Facility: CLINIC | Age: 62
DRG: 007 | End: 2022-08-09
Attending: HOSPITALIST
Payer: MEDICARE

## 2022-08-09 ENCOUNTER — APPOINTMENT (OUTPATIENT)
Dept: GENERAL RADIOLOGY | Facility: CLINIC | Age: 62
DRG: 007 | End: 2022-08-09
Attending: PHYSICIAN ASSISTANT
Payer: MEDICARE

## 2022-08-09 LAB
ALBUMIN SERPL BCG-MCNC: 2.5 G/DL (ref 3.5–5.2)
ALP SERPL-CCNC: 644 U/L (ref 35–104)
ALT SERPL W P-5'-P-CCNC: 601 U/L (ref 10–35)
AMYLASE SERPL-CCNC: 18 U/L (ref 28–100)
ANION GAP SERPL CALCULATED.3IONS-SCNC: 2 MMOL/L (ref 7–15)
AST SERPL W P-5'-P-CCNC: 235 U/L (ref 10–35)
ATRIAL RATE - MUSE: 88 BPM
BACTERIA SPEC CULT: NO GROWTH
BASOPHILS # BLD AUTO: 0 10E3/UL (ref 0–0.2)
BASOPHILS NFR BLD AUTO: 0 %
BILIRUB DIRECT SERPL-MCNC: <0.2 MG/DL (ref 0–0.3)
BILIRUB DIRECT SERPL-MCNC: ABNORMAL MG/DL
BILIRUB SERPL-MCNC: 0.3 MG/DL
BLD PROD TYP BPU: NORMAL
BLOOD COMPONENT TYPE: NORMAL
BUN SERPL-MCNC: 55.6 MG/DL (ref 8–23)
CALCIUM SERPL-MCNC: 8.9 MG/DL (ref 8.8–10.2)
CHLORIDE SERPL-SCNC: 95 MMOL/L (ref 98–107)
CMV DNA SPEC NAA+PROBE-ACNC: NOT DETECTED IU/ML
CODING SYSTEM: NORMAL
CREAT SERPL-MCNC: 1.41 MG/DL (ref 0.51–0.95)
CROSSMATCH: NORMAL
DEPRECATED HCO3 PLAS-SCNC: 39 MMOL/L (ref 22–29)
DIASTOLIC BLOOD PRESSURE - MUSE: NORMAL MMHG
EBV DNA COPIES/ML, INSTRUMENT: 1501 COPIES/ML
EBV DNA SPEC NAA+PROBE-LOG#: 3.2 {LOG_COPIES}/ML
EOSINOPHIL # BLD AUTO: 0 10E3/UL (ref 0–0.7)
EOSINOPHIL NFR BLD AUTO: 1 %
ERYTHROCYTE [DISTWIDTH] IN BLOOD BY AUTOMATED COUNT: 17.8 % (ref 10–15)
GFR SERPL CREATININE-BSD FRML MDRD: 42 ML/MIN/1.73M2
GLUCOSE BLDC GLUCOMTR-MCNC: 130 MG/DL (ref 70–99)
GLUCOSE BLDC GLUCOMTR-MCNC: 131 MG/DL (ref 70–99)
GLUCOSE BLDC GLUCOMTR-MCNC: 139 MG/DL (ref 70–99)
GLUCOSE BLDC GLUCOMTR-MCNC: 176 MG/DL (ref 70–99)
GLUCOSE BLDC GLUCOMTR-MCNC: 187 MG/DL (ref 70–99)
GLUCOSE BLDC GLUCOMTR-MCNC: 213 MG/DL (ref 70–99)
GLUCOSE SERPL-MCNC: 219 MG/DL (ref 70–99)
HCT VFR BLD AUTO: 21.7 % (ref 35–47)
HGB BLD-MCNC: 6.5 G/DL (ref 11.7–15.7)
HGB BLD-MCNC: 8.5 G/DL (ref 11.7–15.7)
HOLD SPECIMEN: NORMAL
IMM GRANULOCYTES # BLD: 0.1 10E3/UL
IMM GRANULOCYTES NFR BLD: 2 %
INR PPP: 0.95 (ref 0.85–1.15)
INTERPRETATION ECG - MUSE: NORMAL
ISSUE DATE AND TIME: NORMAL
LIPASE SERPL-CCNC: 18 U/L (ref 13–60)
LYMPHOCYTES # BLD AUTO: 0.3 10E3/UL (ref 0.8–5.3)
LYMPHOCYTES NFR BLD AUTO: 4 %
MAGNESIUM SERPL-MCNC: 2.4 MG/DL (ref 1.7–2.3)
MCH RBC QN AUTO: 30 PG (ref 26.5–33)
MCHC RBC AUTO-ENTMCNC: 30 G/DL (ref 31.5–36.5)
MCV RBC AUTO: 100 FL (ref 78–100)
MONOCYTES # BLD AUTO: 0.4 10E3/UL (ref 0–1.3)
MONOCYTES NFR BLD AUTO: 7 %
NEUTROPHILS # BLD AUTO: 5.5 10E3/UL (ref 1.6–8.3)
NEUTROPHILS NFR BLD AUTO: 86 %
NRBC # BLD AUTO: 0 10E3/UL
NRBC BLD AUTO-RTO: 0 /100
P AXIS - MUSE: 55 DEGREES
PHOSPHATE SERPL-MCNC: 3.8 MG/DL (ref 2.5–4.5)
PLATELET # BLD AUTO: 239 10E3/UL (ref 150–450)
POTASSIUM SERPL-SCNC: 5.1 MMOL/L (ref 3.4–5.3)
POTASSIUM SERPL-SCNC: 5.5 MMOL/L (ref 3.4–5.3)
POTASSIUM SERPL-SCNC: 5.5 MMOL/L (ref 3.4–5.3)
PR INTERVAL - MUSE: 120 MS
PROT SERPL-MCNC: 4.8 G/DL (ref 6.4–8.3)
QRS DURATION - MUSE: 72 MS
QT - MUSE: 358 MS
QTC - MUSE: 433 MS
R AXIS - MUSE: 7 DEGREES
RBC # BLD AUTO: 2.17 10E6/UL (ref 3.8–5.2)
SODIUM SERPL-SCNC: 136 MMOL/L (ref 136–145)
SYSTOLIC BLOOD PRESSURE - MUSE: NORMAL MMHG
T AXIS - MUSE: 30 DEGREES
TACROLIMUS BLD-MCNC: 6.7 UG/L (ref 5–15)
TME LAST DOSE: NORMAL H
TME LAST DOSE: NORMAL H
UNIT ABO/RH: NORMAL
UNIT NUMBER: NORMAL
UNIT STATUS: NORMAL
UNIT TYPE ISBT: 5100
VENTRICULAR RATE- MUSE: 88 BPM
WBC # BLD AUTO: 6.3 10E3/UL (ref 4–11)

## 2022-08-09 PROCEDURE — 85018 HEMOGLOBIN: CPT | Performed by: STUDENT IN AN ORGANIZED HEALTH CARE EDUCATION/TRAINING PROGRAM

## 2022-08-09 PROCEDURE — 84132 ASSAY OF SERUM POTASSIUM: CPT | Performed by: INTERNAL MEDICINE

## 2022-08-09 PROCEDURE — G1010 CDSM STANSON: HCPCS | Performed by: RADIOLOGY

## 2022-08-09 PROCEDURE — 250N000011 HC RX IP 250 OP 636: Performed by: STUDENT IN AN ORGANIZED HEALTH CARE EDUCATION/TRAINING PROGRAM

## 2022-08-09 PROCEDURE — 84100 ASSAY OF PHOSPHORUS: CPT | Performed by: STUDENT IN AN ORGANIZED HEALTH CARE EDUCATION/TRAINING PROGRAM

## 2022-08-09 PROCEDURE — 74183 MRI ABD W/O CNTR FLWD CNTR: CPT | Mod: 26 | Performed by: RADIOLOGY

## 2022-08-09 PROCEDURE — 36592 COLLECT BLOOD FROM PICC: CPT | Performed by: PHYSICIAN ASSISTANT

## 2022-08-09 PROCEDURE — 94640 AIRWAY INHALATION TREATMENT: CPT

## 2022-08-09 PROCEDURE — 250N000013 HC RX MED GY IP 250 OP 250 PS 637: Performed by: HOSPITALIST

## 2022-08-09 PROCEDURE — 71046 X-RAY EXAM CHEST 2 VIEWS: CPT | Mod: 26 | Performed by: RADIOLOGY

## 2022-08-09 PROCEDURE — 36592 COLLECT BLOOD FROM PICC: CPT | Performed by: STUDENT IN AN ORGANIZED HEALTH CARE EDUCATION/TRAINING PROGRAM

## 2022-08-09 PROCEDURE — 250N000009 HC RX 250: Performed by: NURSE PRACTITIONER

## 2022-08-09 PROCEDURE — 99233 SBSQ HOSP IP/OBS HIGH 50: CPT | Performed by: STUDENT IN AN ORGANIZED HEALTH CARE EDUCATION/TRAINING PROGRAM

## 2022-08-09 PROCEDURE — P9016 RBC LEUKOCYTES REDUCED: HCPCS | Performed by: HOSPITALIST

## 2022-08-09 PROCEDURE — 250N000013 HC RX MED GY IP 250 OP 250 PS 637: Performed by: INTERNAL MEDICINE

## 2022-08-09 PROCEDURE — 250N000013 HC RX MED GY IP 250 OP 250 PS 637: Performed by: SURGERY

## 2022-08-09 PROCEDURE — 80053 COMPREHEN METABOLIC PANEL: CPT | Performed by: INTERNAL MEDICINE

## 2022-08-09 PROCEDURE — 258N000001 HC RX 258: Performed by: STUDENT IN AN ORGANIZED HEALTH CARE EDUCATION/TRAINING PROGRAM

## 2022-08-09 PROCEDURE — 250N000012 HC RX MED GY IP 250 OP 636 PS 637: Performed by: INTERNAL MEDICINE

## 2022-08-09 PROCEDURE — 84132 ASSAY OF SERUM POTASSIUM: CPT | Performed by: STUDENT IN AN ORGANIZED HEALTH CARE EDUCATION/TRAINING PROGRAM

## 2022-08-09 PROCEDURE — 250N000012 HC RX MED GY IP 250 OP 636 PS 637: Performed by: NURSE PRACTITIONER

## 2022-08-09 PROCEDURE — 80197 ASSAY OF TACROLIMUS: CPT | Performed by: PHYSICIAN ASSISTANT

## 2022-08-09 PROCEDURE — 85025 COMPLETE CBC W/AUTO DIFF WBC: CPT | Performed by: INTERNAL MEDICINE

## 2022-08-09 PROCEDURE — 71046 X-RAY EXAM CHEST 2 VIEWS: CPT

## 2022-08-09 PROCEDURE — 99233 SBSQ HOSP IP/OBS HIGH 50: CPT | Mod: 24 | Performed by: PHYSICIAN ASSISTANT

## 2022-08-09 PROCEDURE — C9113 INJ PANTOPRAZOLE SODIUM, VIA: HCPCS | Performed by: STUDENT IN AN ORGANIZED HEALTH CARE EDUCATION/TRAINING PROGRAM

## 2022-08-09 PROCEDURE — 82248 BILIRUBIN DIRECT: CPT | Performed by: INTERNAL MEDICINE

## 2022-08-09 PROCEDURE — 255N000002 HC RX 255 OP 636: Performed by: STUDENT IN AN ORGANIZED HEALTH CARE EDUCATION/TRAINING PROGRAM

## 2022-08-09 PROCEDURE — 85610 PROTHROMBIN TIME: CPT | Performed by: INTERNAL MEDICINE

## 2022-08-09 PROCEDURE — 74183 MRI ABD W/O CNTR FLWD CNTR: CPT | Mod: MG

## 2022-08-09 PROCEDURE — 250N000011 HC RX IP 250 OP 636: Performed by: HOSPITALIST

## 2022-08-09 PROCEDURE — 250N000012 HC RX MED GY IP 250 OP 636 PS 637: Performed by: PHYSICIAN ASSISTANT

## 2022-08-09 PROCEDURE — 250N000013 HC RX MED GY IP 250 OP 250 PS 637: Performed by: PHYSICIAN ASSISTANT

## 2022-08-09 PROCEDURE — 250N000013 HC RX MED GY IP 250 OP 250 PS 637: Performed by: STUDENT IN AN ORGANIZED HEALTH CARE EDUCATION/TRAINING PROGRAM

## 2022-08-09 PROCEDURE — 120N000002 HC R&B MED SURG/OB UMMC

## 2022-08-09 PROCEDURE — 83735 ASSAY OF MAGNESIUM: CPT | Performed by: STUDENT IN AN ORGANIZED HEALTH CARE EDUCATION/TRAINING PROGRAM

## 2022-08-09 PROCEDURE — 250N000013 HC RX MED GY IP 250 OP 250 PS 637: Performed by: NURSE PRACTITIONER

## 2022-08-09 PROCEDURE — A9585 GADOBUTROL INJECTION: HCPCS | Performed by: STUDENT IN AN ORGANIZED HEALTH CARE EDUCATION/TRAINING PROGRAM

## 2022-08-09 RX ORDER — FUROSEMIDE 10 MG/ML
20 INJECTION INTRAMUSCULAR; INTRAVENOUS ONCE
Status: COMPLETED | OUTPATIENT
Start: 2022-08-09 | End: 2022-08-09

## 2022-08-09 RX ORDER — VALGANCICLOVIR HYDROCHLORIDE 50 MG/ML
450 POWDER, FOR SOLUTION ORAL
Status: DISCONTINUED | OUTPATIENT
Start: 2022-08-10 | End: 2022-08-17 | Stop reason: HOSPADM

## 2022-08-09 RX ORDER — NICOTINE POLACRILEX 4 MG
15-30 LOZENGE BUCCAL
Status: DISCONTINUED | OUTPATIENT
Start: 2022-08-09 | End: 2022-08-09

## 2022-08-09 RX ORDER — GADOBUTROL 604.72 MG/ML
7.5 INJECTION INTRAVENOUS ONCE
Status: COMPLETED | OUTPATIENT
Start: 2022-08-09 | End: 2022-08-09

## 2022-08-09 RX ORDER — DEXTROSE MONOHYDRATE 25 G/50ML
25-50 INJECTION, SOLUTION INTRAVENOUS
Status: DISCONTINUED | OUTPATIENT
Start: 2022-08-09 | End: 2022-08-09

## 2022-08-09 RX ORDER — DEXTROSE MONOHYDRATE 25 G/50ML
25 INJECTION, SOLUTION INTRAVENOUS ONCE
Status: COMPLETED | OUTPATIENT
Start: 2022-08-09 | End: 2022-08-09

## 2022-08-09 RX ADMIN — PANTOPRAZOLE SODIUM 40 MG: 40 INJECTION, POWDER, FOR SOLUTION INTRAVENOUS at 09:45

## 2022-08-09 RX ADMIN — ONDANSETRON 4 MG: 4 TABLET, ORALLY DISINTEGRATING ORAL at 09:46

## 2022-08-09 RX ADMIN — CALCIUM CARBONATE 600 MG (1,500 MG)-VITAMIN D3 400 UNIT TABLET 1 TABLET: at 17:01

## 2022-08-09 RX ADMIN — NYSTATIN 1000000 UNITS: 100000 SUSPENSION ORAL at 17:05

## 2022-08-09 RX ADMIN — LEVALBUTEROL HYDROCHLORIDE 1.25 MG: 1.25 SOLUTION RESPIRATORY (INHALATION) at 08:56

## 2022-08-09 RX ADMIN — HYDROXYZINE HYDROCHLORIDE 50 MG: 25 TABLET ORAL at 20:18

## 2022-08-09 RX ADMIN — NYSTATIN 1000000 UNITS: 100000 SUSPENSION ORAL at 20:18

## 2022-08-09 RX ADMIN — ACETAMINOPHEN 975 MG: 325 TABLET, FILM COATED ORAL at 09:45

## 2022-08-09 RX ADMIN — OXYCODONE HYDROCHLORIDE 10 MG: 5 TABLET ORAL at 20:18

## 2022-08-09 RX ADMIN — PANTOPRAZOLE SODIUM 40 MG: 40 INJECTION, POWDER, FOR SOLUTION INTRAVENOUS at 20:17

## 2022-08-09 RX ADMIN — ACETYLCYSTEINE 2 ML: 200 INHALANT RESPIRATORY (INHALATION) at 08:56

## 2022-08-09 RX ADMIN — FUROSEMIDE 20 MG: 10 INJECTION, SOLUTION INTRAMUSCULAR; INTRAVENOUS at 11:15

## 2022-08-09 RX ADMIN — TACROLIMUS 4 MG: 5 CAPSULE ORAL at 09:47

## 2022-08-09 RX ADMIN — PREDNISONE 12.5 MG: 2.5 TABLET ORAL at 09:46

## 2022-08-09 RX ADMIN — NYSTATIN: 100000 CREAM TOPICAL at 20:20

## 2022-08-09 RX ADMIN — GADOBUTROL 7.5 ML: 604.72 INJECTION INTRAVENOUS at 20:55

## 2022-08-09 RX ADMIN — Medication 0.05 MG: at 14:07

## 2022-08-09 RX ADMIN — PREDNISONE 10 MG: 10 TABLET ORAL at 20:18

## 2022-08-09 RX ADMIN — OXYCODONE HYDROCHLORIDE 10 MG: 5 TABLET ORAL at 09:46

## 2022-08-09 RX ADMIN — HUMAN INSULIN 7 UNITS: 100 INJECTION, SOLUTION SUBCUTANEOUS at 11:14

## 2022-08-09 RX ADMIN — METOPROLOL TARTRATE 50 MG: 50 TABLET, FILM COATED ORAL at 10:21

## 2022-08-09 RX ADMIN — OXYCODONE HYDROCHLORIDE 10 MG: 5 TABLET ORAL at 01:11

## 2022-08-09 RX ADMIN — MYCOPHENOLATE MOFETIL 750 MG: 200 POWDER, FOR SUSPENSION ORAL at 20:17

## 2022-08-09 RX ADMIN — MYCOPHENOLATE MOFETIL 750 MG: 200 POWDER, FOR SUSPENSION ORAL at 09:45

## 2022-08-09 RX ADMIN — METOPROLOL TARTRATE 50 MG: 50 TABLET, FILM COATED ORAL at 20:17

## 2022-08-09 RX ADMIN — TACROLIMUS 5 MG: 5 CAPSULE ORAL at 17:01

## 2022-08-09 RX ADMIN — DEXTROSE MONOHYDRATE 25 G: 25 INJECTION, SOLUTION INTRAVENOUS at 11:14

## 2022-08-09 RX ADMIN — DEXTROSE MONOHYDRATE 300 ML: 100 INJECTION, SOLUTION INTRAVENOUS at 09:24

## 2022-08-09 ASSESSMENT — ACTIVITIES OF DAILY LIVING (ADL)
ADLS_ACUITY_SCORE: 33

## 2022-08-09 NOTE — PROGRESS NOTES
[facility]    Medicine Progress Note - Hospitalist Service, GOLD TEAM 10    Date of Admission:  6/28/2022    Assessment & Plan        Sofie Rodriguez is a 62 year old female admitted on 6/28/2022. She has PMH of end stage COPD s/p b/l lung transplant on 6/28/2022, HTN, HFpEF, h/o hepC, osteopenia, and former methamphetamine use, and she was transferred to [place] [address] [facility] from MICU on 7/15/2022. She was admitted to the MICU on 7/13/20222 with prior hospital course complicated by left upper extremity acute limb ischemia s/p left radial thrombectomy on 7/1/2022. She was primarily treated for acute hypercapnic respiratory failure on intermittent BIPAP with worsening BACILIO while in the MICU. Breathing is improving, but patient has severely delayed gastric emptying found on NM study. PEGJ placed  Chest tubes out. Still needing to advance feeds     Today's Plan:  -transfusing due to hgb <7  -discussed with GI - plan for EGD today, wanting K (5.5) improved prior, MRCP to occur after EGD  -shift K, plan to reverse post case check  -holding long-acting insulin due to holding tube feeds  -holding heparin drip    Acute Hepatitis  Extrahepatic mass  Dramatic rise in LFT's-- AST > ALT; ALT 700s, AST 1000s; bili rising 0.9 from 0.2. No fever. Concern for infectious vs malignancy vs fluid collection.  - MRCP post EGD  -trend liver labs  -Hepatology consulted, appreciate assistance  -tylenol only at BID    End stage COPD s/p BSLT 6/28/2022  Acute hypercapnic hypoxic respiratory failure (improving)  EBV Viremia  likely 2/2 decreased central respiratory drive vs respiratory muscle weakness and some pulmonary edema / fluid Transplanted 6/28. formal SNIFF test was performed on 7/14 showed R hemidiaphragm palsy. Of note transplanted lungs were also considered small for body size and could contribute to decreased respiratory compensation for hypercarbia. VBG improving gradually  - oxycodone 5-10  mg q4h PRN  - encourage activity and getting up in bed; PT/OT participation  - encourage chest physiotherapy QID  - weaned off Bipap at night  Immunosuppression:  - Prednisone per pulm  - Tacrolimus per pulm  -  BID  Prophylaxis:  - CMV - Valgancyclovir  - Thrush - PO nysatin  - PJP - TMP-SMX; dapsone started 7/18 at 50mg qMWF     Severe Gastroparesis - gastric emptying study 7/20  - gastric emptying study 7/20 showed delayed gastric emptying with retention of 95% of stomach contents after 4 hours;  - 7/27: PEGJ placed- tolerating tube feeds via J  Feeding regimen: J tube feeds continuous-- transitioned formula to non-renal formula as her kidneys have improved/needs more volume, appreciate RD assistance as well-- holding feeds while dealing with abdominal pain, melena     Peptic ulcer at the pyloric junction with acute blood loss anemia in the setting of acute on chronic anemia-- had acute blood loss anemia after transplant-- had stabilized. Gradual hgb downtrend, then after inspection bronch her G tube was hooked back up to gravity and large brown/black output seen. EGD on 8/3 showed pyloric junction ulcer and swelling causing gastric outlet objection  -GI consult, appreciate assistance  -BID PPI   -NPO awaiting EGD for melena  -okay to resume heparin drip 8/4- not bleeding on this as of 8/5-8/7- HOLDING due to new melena  - hgb transfusion 8/3-- hgb stable    Pleural Effusion, Right and Left  Left chest tube removed 8/4, right chest tube remaining, appreciate CVTS assistance  - daily CXR     Hypotension, resolved  H/o HTN  H/o HFpEF  Likely vasoplegia post operatively. Last echo 7/7 normal EF with good LV function. S/p hydrocortisone 7/6-7/9 and fludrocortisone 7/6-7/11 without significant improvement.  - off midorine 7/19  - Holding PTA lasix 20mg daily-- diuresis intermittently    BACILIO-- recurrent, improved 8/1-- likely due to supratherapeutic tacrolimus  Hypermagnesemia  Hyperphosphatemia  Metabolic  alkalosis  Baseline renal function was normal prior to surgery. New onset renal failure after transplant, likely multifactorial due to pre-renal hypotension, less likely nephrotoxic agents. Overall kidney function improving. Today, Cr fluctuating but BUN increasing, with unclear urine output (7/22).   - HD cath removed 7/22, trend metabolites    C.diff - vanco started 7/12, course completed  -rechecked due to diarrhea (8/5)-- negative on 8/6    Tachyarrthymia  Paroxysmal afib  Paroxysmal aflutter/AT  SVT vs afib.Had an occurrence during HD on 7/14. Had afib with RVR to 200s on 7/17. EP consult placed.asmyptomatic and stable during episodes. Aflutter episode (7/17) with transfer. Vagal maneuver responsive at time. No recent tachyarrhythmia episodes (7/22).   - Per EP: patient has had episodes of possible flutter (vs AT with 2:1 conduction) and afib with RVR  - heparin drip -- eventual plan to transition to DOAC after PEGJ placement and chest tubes resolved (CHADS-VASc score of 2)  --HOLDING heparin drip 8/8 due to melena  - On telemetry  - On metoprolol tartrate 25mg BID-- increasing to 50 BID 8/7  - Discharge on ziopatch for 14 days with EP follow up in 1-2 months after     Stress-induced/steroid induced hyperglycemia with intermittent hypoglycemia (8/3)  Has been controlled on 13-15 units of glargine BID  - on 7 units BID  8/5 since she is tapering steroid and has been NPO-- holding long acting given NPO/holding tube feeds  -- will continue to monitor her needs        Diet: NPO per Anesthesia Guidelines for Procedure/Surgery Except for: No Exceptions  Adult Formula Drip Feeding: Continuous Novasource Renal; Jejunostomy; Goal Rate: 35 mL/hr- continuous; mL/hr; From: 8:00 PM; 8:00 PM; Medication - Feeding Tube Flush Frequency: At least 15-30 mL water before and after medication administration and...    DVT Prophylaxis: SCDs  Dong Catheter: Not present  Central Lines: PRESENT       Cardiac Monitoring: None  Code  Status: Full Code      Disposition Plan      Expected Discharge Date: 08/12/2022    Discharge Delays: Other (Add Comment)  Destination: home  Discharge Comments: TCU,        The patient's care was discussed with the Bedside Nurse, Patient and pulm, GI luminal and GI hepatology and nutrition Consultant.    Salvador Matthew MD  Hospitalist Service, GOLD TEAM 10  [facility]  Securely message with the Vocera Web Console (learn more here)  Text page via [facility] Paging/Directory   Please see signed in provider for up to date coverage information      Clinically Significant Risk Factors Present on Admission                      ______________________________________________________________________    Interval History   Feeling ok this morning. Has had melena and coughed up a bit of phlegm with specks of blood this morning. NO change in mild abdominal pain. No nausea or vomiting. No fevers or chills. Breathing is comfortable, able to be on RA.  Denies other concerns.    Data reviewed today: I reviewed all medications, new labs and imaging results over the last 24 hours. I personally reviewed the chest x-ray image(s) showing chest tubes are out; post surgical changes.    Physical Exam   Vital Signs: Temp: 99  F (37.2  C) Temp src: Oral BP: 115/69 Pulse: 106   Resp: 19 SpO2: 100 % O2 Device: Nasal cannula Oxygen Delivery: 1 LPM  Weight: 149 lbs 9.6 oz  Constitutional: Resting in bed.  Head: Normocephalic. Atraumatic.   EENT: No conjunctival injection or icterus. Nares patent. Moist mucous membranes.   Cardiovascular: Regular rate and rhythm. No murmurs, clicks, gallops or rubs. No edema  Respiratory: Clear to auscultation without wheezes or crackles. Normal respiratory rate and effort.   Gastrointestinal: Abdomen diffusely tender throughout. Bowel sounds active. G tube with very little output - some dark brown flakes. No tube feeds running.   Musculoskeletal: extremities  normal- no gross deformities noted and normal muscle tone  Skin: no suspicious lesions, rashes, jaundice, or cyanosis   Psychiatric: mentation appears normal and affect normal/bright      Data   Recent Labs   Lab 08/09/22  1156 08/09/22  0742 08/09/22  0548 08/09/22  0117 08/08/22  2238 08/08/22  1759 08/08/22  1630 08/08/22  0916 08/08/22  0740 08/07/22  1121 08/07/22  0842 08/03/22  0756 08/03/22  0633   WBC  --   --  6.3  --   --   --   --   --  4.6  --  8.4   < > 5.4   HGB  --   --  6.5*  --   --   --  7.3*  --  6.5*  --  7.7*   < > 6.5*   MCV  --   --  100  --   --   --   --   --  101*  --  102*   < > 105*   PLT  --   --  239  --   --   --   --   --  242  --  255   < > 202   INR  --   --  0.95  --   --   --  1.07  --   --   --   --   --  0.87   NA  --   --  136  --   --   --   --   --  142  --  140   < > 140   POTASSIUM  --   --  5.5*  5.5*  --  5.7*  --  6.1*  --  5.5*  --  4.7   < > 5.1   CHLORIDE  --   --  95*  --   --   --   --   --  99  --  96*   < > 94*   CO2  --   --  39*  --   --   --   --   --  41*  --  41*   < > 45*   BUN  --   --  55.6*  --   --   --   --   --  63.9*  --  56.9*   < > 73.5*   CR  --   --  1.41*  --   --   --   --   --  1.23*  --  1.13*   < > 1.39*   ANIONGAP  --   --  2*  --   --   --   --   --  2*  --  3*   < > 1*   ESTUARDO  --   --  8.9  --   --   --   --   --  8.9  --  9.1   < > 9.2   * 213* 219*   < >  --    < >  --    < > 116*   < > 160*   < > 113*   ALBUMIN  --   --  2.5*  --   --   --   --   --  2.5*  --   --    < >  --    PROTTOTAL  --   --  4.8*  --   --   --   --   --  5.0*  --   --    < >  --    BILITOTAL  --   --  0.3  --   --   --   --   --  0.9  --   --    < >  --    ALKPHOS  --   --  644*  --   --   --   --   --  861*  --   --    < >  --    ALT  --   --  601*  --   --   --   --   --  791*  --   --    < >  --    AST  --   --  235*  --   --   --   --   --  1,143*  --   --    < >  --    LIPASE  --   --   --   --   --   --   --   --  18  --   --   --   --     < > =  values in this interval not displayed.     Recent Results (from the past 24 hour(s))   XR Abdomen 2 Views    Narrative    EXAMINATION:  XR ABDOMEN 2 VIEWS 8/8/2022 4:47 PM     COMPARISON: Gastrojejunostomy tube placement images 7/27/2022,  radiographs 7/16/2022, CT 7/15/2022.    HISTORY: Abd pain worse when feeds increased-- Want to rule out ulcer  perforation vs ileus    TECHNIQUE: Frontal upright and supine views of the abdomen.    FINDINGS: Gastrojejunostomy bulb projects over the stomach, tube tip  projects over the jejunum. No evidence of peritoneal free air. No  abnormally dilated loops of bowel. No pneumatosis or portal venous  gas.    Changes of bilateral lung transplant. Bilateral loculated pleural  effusions and atelectasis.      Impression    IMPRESSION: No radiographic evidence of peritoneal free air. No  abnormally dilated loops of bowel.    I have personally reviewed the examination and initial interpretation  and I agree with the findings.    GABRIELLA SNELL MD         SYSTEM ID:  [id number]   US Abdomen Limited w Abd/Pelv Duplex Complete Port   Result Value    Radiologist flags (Urgent)     New common bile duct dilatation with potential mass    Narrative    EXAMINATION: US ABDOMEN LIMITED WITH DOPPLER COMPLETE PORTABLE  8/8/2022 5:56 PM     COMPARISON: CT chest 7/28/2022    HISTORY: New abdominal pain in setting of change in tube feeds but  with rising LFTs concerning for cholangitis versus hepatitis.    TECHNIQUE: The abdomen was scanned in standard fashion with  specialized ultrasound transducer(s) using both gray-scale, color  Doppler, and spectral flow techniques.    Findings:  Liver: The liver demonstrates normal homogeneous echotexture measuring  12.5 cm in length. No evidence of a focal hepatic mass.     Extrahepatic portal vein flow is antegrade at 24 cm/s.  Right portal vein flow is antegrade, measuring 26 cm/s.  Left portal vein flow is antegrade, measuring 33 cm/s.    Flow in the hepatic  artery is towards the liver and:  94 cm/s peak systolic  0.65 resistive index.     The splenic vein is patent and flow is towards the liver.  The left,  middle, and right hepatic veins are patent with flow towards the IVC.  The IVC is patent with flow towards the heart.   The visualized aorta  is not dilated.    Gallbladder: Layering sludge in the gallbladder with borderline wall  thickening measuring up to 4 mm. No pericholecystic fluid, positive  sonographic Sánchez's sign or evidence for cholelithiasis    Bile Ducts: Intrahepatic biliary dilation with an echogenic  extrahepatic mass at the level of the common bile duct measuring 3.7 x  2.8 x 1.8 cm. The common bile duct measures 11 mm.    Pancreas: Visualized portions of the head and body of the pancreas are  unremarkable.     Kidneys: Both kidneys are of normal echotexture, without mass or  hydronephrosis.   Renal lengths: right- 8.1 cm.    Fluid: Small right pleural effusion.      Impression    Impression:   1.  Extrahepatic mass at the level of the common bile duct with  associated intrahepatic and common bile duct dilation measuring 3.7 x  2.8 x 1.8 cm. Differential includes malignancy including  cholangiocarcinoma. Prior PET/CT from July of last year did not show  any lesions in this area. It did however show a duodenal diverticulum.  Recommend follow-up abdominal liver MRI with MRCP or pancreatic mass  CT for further evaluation.  2.  Patent hepatic vasculature without sonographic evidence to suggest  hemodynamically significant stenosis.  3.  Layering gallbladder sludge without additional evidence to suggest  acute cholecystitis.  4.  Small right pleural effusion.    [Access Center: New common bile duct dilatation with potential mass  near the pancreas. MR of the abdomen with MRCP versus CT with  pancreatic mass protocol recommended.]    This report will be copied to the [facility] to ensure a  provider acknowledges the finding. [facility] is  available Monday  through Friday 8am-3:30 pm.     I have personally reviewed the examination and initial interpretation  and I agree with the findings.    GABRIELLA SNELL MD         SYSTEM ID:  [id number]     Medications     dextrose Stopped (07/15/22 1801)     [Held by provider] heparin Stopped (08/08/22 1645)       acetaminophen  975 mg Per J Tube 2 times daily     acetylcysteine  2 mL Nebulization BID     [Held by provider] aspirin  81 mg Per J Tube Daily     calcium carbonate 600 mg-vitamin D 400 units  1 tablet Per J Tube BID w/meals     dapsone  50 mg Per J Tube Once per day on Mon Wed Fri     fludrocortisone  0.05 mg Per J Tube Daily     insulin aspart  1-12 Units Subcutaneous Q4H     [Held by provider] insulin glargine  7 Units Subcutaneous BID     levalbuterol  1.25 mg Nebulization 2 times daily     metoprolol tartrate  50 mg Per Feeding Tube BID     multivitamin, therapeutic  1 tablet Per Feeding Tube Daily     mycophenolate  750 mg Per J Tube BID     nystatin   Topical BID     nystatin  1,000,000 Units Swish & Swallow 4x Daily     ondansetron  4 mg Oral Daily     [Held by provider] pantoprazole  40 mg Per J Tube BID     pantoprazole (PROTONIX) IV  40 mg Intravenous BID     predniSONE  12.5 mg Per J Tube QAM    And     predniSONE  10 mg Per J Tube QPM     protein modular  1 packet Per Feeding Tube Daily     sodium chloride (PF)  10 mL Intracatheter Q8H     sodium chloride (PF)  10 mL Intracatheter Q8H     sodium chloride (PF)  3 mL Intracatheter Q8H     tacrolimus  5 mg Per J Tube BID IS     valGANciclovir  450 mg Oral or Feeding Tube Daily

## 2022-08-09 NOTE — PROGRESS NOTES
Pulmonary Medicine  Cystic Fibrosis - Lung Transplant Team  Progress Note  2022       Patient: Sofie Rodriguez  MRN: [id number]  : 1960 (age 62 year old)  Transplant: 2022 (Lung), POD#42  Admission date: 2022    Assessment & Plan:     Sofie Rodriguez is a 62 year old female with a PMH significant for end-stage COPD, HTN, HFpEF, Mycobacterium peregrinum colonization, h/o hepatitis C, HECTOR s/p LEEP procedure, osteopenia, and former methamphetamine use.  Pt. is now s/p BSLT on 22, lungs slightly undersized.   Persistent low dose pressor needs post-op through 7/10.  Extubated POD #2 but with persistent hypercapnia, mostly compensated and only slightly improved with intermittent BiPAP, discontinued 8/3.  Also with bilateral pleural effusions, left radial artery thrombus (presumed secondary to arterial line) s/p thrombectomy , BACILIO, C diff, gastroparesis s/p GJ tube placement in IR , and coffee ground G tube output s/p EGD 8/3 with pyloric ulcer noted.  Hemoglobin drop with dark tarry stools .  Also with acute rise in LFTs , abdominal US with extrahepatic mass.      Today's recommendations:  - DSA and IgG ordered   - Discontinued chest physiotherapy, continue nebs BID for now  - Awaiting CXR today, if stable will adjust to every other day  - Wean supplemental oxygen as able, exercise and overnight oximetry should be ordered prior to discharge  - Will need BLE US prior to discharge (screening for thrombus)  - Tacrolimus level subtherapeutic, dose increased, repeat level to trend ordered  and steady state ordered   - Prednisone taper due  (not yet ordered)  - Hepatitis C RNA and EBV () pending  - Florinef ordered given ongoing hyperkalemia   - Awaiting MRCP  - Repeat EGD timing per GI    S/p bilateral sequential lung transplant (BSLT) for end stage COPD:  Persistent hypercapneic respiratory failure:   Persistent hypotension, Resolved:   Bilateral hydroPTX:  Right  hemidiaphragm palsy: Explant pathology with severe emphysema with subpleural bullae formation, changes of chronic bronchitis, subpleural scars and patchy pulmonary edema; benign hilar lymph nodes.  Extubated 6/30.  Persistent hypercapnia without dyspnea, appears to be a respiratory drive problem.  Sniff (7/14) notable for right hemidiaphragm palsy.  Bronch (7/19) with slight graying of mucosa noted at right anastomosis and scant secretions (slightly increased in RLL).  Chest CT (7/23) with increased loculated fluid within the left hemithorax with specks of air density in the loculated fluid, similar appearing loculated right hydroPTX with minimal decrease in fluid component, increased size of pleural based lesion in MARLON, and mild subcutaneous emphysema along right chest tube with minimal along tract of removed left chest tube.  S/p left apical chest tube 7/25-8/4, right surgical tube removed 8/6.  Bronch (8/2) with normal inspection of anastomoses.  NIPPV initially overnight for persistent hypercapnia, stopped 8/3 given lack of improvement with therapy, compensated hypercapnia persists.  On RA-1L NC.   - Repeat DSA in one month (8/28, ordered)  - Nebs: levalbuterol and Mucomyst BID   - Pulmonary toilet with chest physiotherapy BID --> discontinue given improved cough strength (ordered)  - Aerobika and incentive spirometry hourly while awake  - CXR daily, awaiting today, if stable will adjust to every other day  - Supplemental oxygen as needed to maintain SpO2 >92% (wean as able), exercise and overnight oximetry should be ordered prior to discharge   - Will need BLE US prior to discharge (screening for thrombus)     Immunosuppression:  Induction therapy with basiliximab (and high dose IV steroid).  - Tacrolimus 4 mg BID (via J tube).  Goal level 8-12.  Level 8/9 subtherapeutic at 6.7 (11h), increase dose to 5 mg BID, repeat level 8/11 to trend and 8/12 for steady state (ordered).  -  mg BID (increased 8/7,  prior decrease for GI symptoms/leukopenia)   - Prednisone 12.5 mg qAM / 10 mg qPM, next taper due 8/18 (not yet ordered)  Date AM dose (mg) PM dose (mg)   8/4/22 12.5 10   8/18/22 10 10   9/15/22 10 7.5   10/13/22 7.5 7.5   11/10/22 7.5 5   12/8/22 5 5   1/5/23 5 2.5      Prophylaxis:   - Dapsone qMWF for PJP ppx (7/18)  - VGCV for CMV ppx, CMV negative 8/8  - Nystatin for oral candidiasis ppx, 6 month course  - See below for serologies and viral ppx:    Donor Recipient Intervention   CMV status Positive Positive Valganciclovir POD #8-90   EBV status Positive Positive EBV monitoring as below   HSV status N/A Positive Not indicated      ID: Donor bronch cultures (OSH) with Strep beta hemolytic (not group A).     H/o M. peregrinum colonization: NGTD post-transplant.  - AFB to be sent on all future bronchs     Streptococcus pneumoniae:  Stenotrophomonas maltophilia: Noted in recipient cultures at time of transplant.  S/p ceftazidime 6/28-7/10, vancomycin 7/7-7/8 and 6/28-6/30, and levofloxacin 7/10-7/12 for total 2 week ABX course.     Hypogammaglobulinemia: IgG adequate at time of transplant, repeat level low (336) on 7/28.  S/p IVIG dosing with premedication 7/30, tolerated well.  - Repeat IgG in one month (8/28, ordered)     H/o hepatitis C:  Diagnosed in 1980s s/p 2m treatment, quant negative since 10/2017, last positive 2/20/17 ([phone]).  Ab positive on 6/2021 with negative HCV PCR.  H/o remote EtOH abuse.  MR elastography (4/27/21) with hepatology review and consult without any concerns post transplant.  Hepatitis C RNA negative and hepatitis C antibody positive (old) on 6/28.  - Hepatitis C RNA (8/8) pending in setting of elevated LFTs as below     EBV viremia: EBV level 11k, log 4.1 on 7/28.  Not likely to be clinically significant.  - Repeat EBV (8/8) pending in setting of elevated LFTs as below     Other relevant problems managed by primary team:      SVT:   Aflutter with RVR: SVT first noted on 7/14, prior  to HD line placement.  Continues intermittently.  Aflutter with RVR to 200 7/17 triggered by activity.  EP consulted 7/17 given persistent tachycardia/dysrhythmia.  Midodrine held 7/19 and since discontinued.  - AC and metoprolol per primary team     Left hand ischemia: Radial artery thrombosis identified on duplex doppler.  S/p thrombectomy on 7/2.   - AC per primary team as above      BACILIO:   Hyperkalemia: Likely multifactorial including medications (Bactrim, tacrolimus) and hypotension.  Fludrocortisone 7/6-7/11.  Potassium had been stable, now elevated to 5.5-5.7 since 8/8.  S/p non-tunneled HD line 7/14.  Initial iHD run 7/14 limited by afib with RVR vs SVT.  Last iHD run 7/16, line removed 7/22.  Creatinine increased since 7/29 with Diamox and elevated tacrolimus level.  Transitioned to low potassium TF formula 8/8 although currently held.  - Tacrolimus monitoring as above  - Florinef (ordered)  - Management per primary team    Elevated LFTs:   Extrahepatic mass: Acute rise in LFTs (alk phos 861, , AST 1,143 and bilirubin 0.9) on 8/8 (prior normal).  Also in setting of increased and different abdominal pain as below.  Amylase low, lipase normal.  Abdominal US 8/8 with extrahepatic mass at level of common bile duct with associated intrahepatic and common bile duct dilation, patent hepatic vasculature, and layering gallbladder sludge.  Concern for infection vs malignancy vs fluid collection.   - LFTs daily  - Management per primary team, awaiting MRCP, primary team discussing with hepatology     Severe gastroparesis:   Pyloric ulcer: Cramping abdominal pain 7/15.  AXR with large stool burden in colon, no obstruction or distention.  Some nausea but no emesis.  CT abdomen (7/15) with moderate to large gastric distention, otherwise without obstruction.  NG placed for LIS with moderate to large output for several days.  Increased abdominal pain 7/17 after clamping for 4 hours.  Gastric emptying  study (7/20) with severe gastric emptying delay (95% retention at 4 hours).  S/p GJ tube placement in IR 7/27.  S/p EGD 8/3 for coffee ground G tube output, noted to have pyloric ulcer and excessive gastric fluid and residual food.  Increased abdominal pain/cramping with trial of cycled TF 8/7 to 8/8.  AXR 8/8 without evidence of peritoneal free air or abnormally dilated loops of bowel.  Hemoglobin drop with dark tarry stools 8/8.  - PPI BID  - Simethicone prn  - TF via J tube, management per RD and primary team  - G tube to gravity drainage   - Strict NPO except for ice chips and sips  - Repeat EGD timing per GI     C diff infection: Abdominal pain with diarrhea noted 7/8, AXR without dilated bowel, moderate colonic stool burden.  C diff positive 7/11.  Loose stools (on tube feeds) stable.  S/p PO vancomycin (7/11-7/28).  Repeat C diff negative 8/6.  - Low threshold to resume vancomycin in the future with ABX course     Hypomagnesemia: Suppressed absorption d/t CNI.   - Continue daily magnesium with replacement protocol prn, schedule oral magnesium supplementation if needed prior to discharge    We appreciate the excellent care provided by the [place] [id number] team.  Recommendations communicated via in person rounding and this note.  Will continue to follow along closely, please do not hesitate to call with any questions or concerns.    Patient discussed with Dr. Castillo.    Yuliet Aviles PA-C  Inpatient [facility]  Pulmonary CF/Transplant     Subjective & Interval History:     Feeling better today, abdominal pain is at a 2/10 (from 7-10/10 yesterday).  Denies nausea or vomiting.  TF have remained off with likely procedures today.  Hemoglobin low at 6.5, dark tarry stools noted per nursing.  On RA-1L NC, occasionally desats on RA.  Denies significant dyspnea.  Cough productive of clear/yellow sputum with small amount of blood specks this morning.  Received chest physiotherapy once yesterday, limited benefit per Pt.   "Potassium remains elevated at 5.5 this morning.    Review of Systems:     C: No fever, no chills, + increased weight  INTEGUMENTARY/SKIN: No rash or obvious new lesions  ENT/MOUTH: No sore throat, no sinus pain, no nasal congestion or drainage  RESP: See interval history  CV: No chest pain, + occasional palpitations, no peripheral edema  GI: See interval history  : No dysuria  MUSCULOSKELETAL: No myalgias, no arthralgias  ENDOCRINE: + blood sugars intermittently elevated  NEURO: No headache, no numbness or tingling  PSYCHIATRIC: Mood stable    Physical Exam:     All notes, images, and labs from past 24 hours (at minimum) were reviewed.    Vital signs:  Temp: 98.9  F (37.2  C) Temp src: Oral BP: 115/69 Pulse: 106   Resp: 19 SpO2: 100 % O2 Device: Nasal cannula Oxygen Delivery: 1 LPM Height: 157.5 cm (5' 2\") Weight: 67.9 kg (149 lb 9.6 oz)  I/O:     Intake/Output Summary (Last 24 hours) at 8/9/2022 1117  Last data filed at 8/9/2022 0600  Gross per 24 hour   Intake 953.75 ml   Output 650 ml   Net 303.75 ml     Constitutional: Sitting in chair, in no apparent distress.   HEENT: Eyes with pink conjunctivae, anicteric.  Oral mucosa moist without lesions.   PULM: Mildly diminished air flow to bases bilaterally.  Bibasilar crackles.  No rhonchi, no wheezes.  Non-labored breathing on RA.  CV: Normal S1 and S2.  Tachycardic.  No murmur, gallop, or rub.  No peripheral edema.   ABD: NABS, soft, + mildly tender to right side/epigastric region, nondistended.    MSK: Moves all extremities.    NEURO: Alert, conversant.   SKIN: Warm, dry.  No rash on limited exam.  Clamshell incision not visualized.    PSYCH: Mood stable.     Lines, Drains, and Devices:  Midline Catheter Double Lumen (Active)   Site Assessment WDL 08/09/22 0000   External Cath Length (cm) 2 cm 08/04/22 1244   Initial Extremity Circumference (cm) 29 cm 07/28/22 1455   Dressing Intervention Chlorhexidine patch 08/09/22 0000   Line Necessity Yes, meets criteria " 08/09/22 0000   Dressing Change Due 08/11/22 08/09/22 1000   Purple - Status infusing 08/09/22 0000   Purple - Cap Change Due 08/09/22 08/09/22 0000   Red - Status saline locked 08/09/22 0000   Red - Cap Change Due 08/09/22 08/09/22 0000   Extravasation? No 08/09/22 0000   Number of days: 12     Data:     LABS    CMP:   Recent Labs   Lab 08/09/22  0742 08/09/22  0548 08/09/22  0117 08/08/22  2238 08/08/22  2101 08/08/22  1759 08/08/22  1630 08/08/22  0916 08/08/22  0740 08/07/22  1121 08/07/22  0842 08/06/22  0812 08/06/22  0646 08/04/22  1202 08/04/22  0902   NA  --  136  --   --   --   --   --   --  142  --  140  --  140   < > 140   POTASSIUM  --  5.5*  5.5*  --  5.7*  --   --  6.1*  --  5.5*  --  4.7  --  4.3   < > 4.9   CHLORIDE  --  95*  --   --   --   --   --   --  99  --  96*  --  95*   < > 96*   CO2  --  39*  --   --   --   --   --   --  41*  --  41*  --  41*   < > 41*   ANIONGAP  --  2*  --   --   --   --   --   --  2*  --  3*  --  4*   < > 3*   * 219* 176*  --  153*   < >  --    < > 116*   < > 160*   < > 143*   < > 122*   BUN  --  55.6*  --   --   --   --   --   --  63.9*  --  56.9*  --  56.1*   < > 60.3*   CR  --  1.41*  --   --   --   --   --   --  1.23*  --  1.13*  --  1.21*   < > 1.20*   GFRESTIMATED  --  42*  --   --   --   --   --   --  50*  --  55*  --  51*   < > 52*   ESTUARDO  --  8.9  --   --   --   --   --   --  8.9  --  9.1  --  9.0   < > 9.4   MAG  --  2.4*  --   --   --   --   --   --  2.5*  --  2.5*  --  2.6*   < > 2.4*   PHOS  --  3.8  --   --   --   --   --   --  3.2  --  3.0  --  3.9   < > 3.1   PROTTOTAL  --  4.8*  --   --   --   --   --   --  5.0*  --   --   --   --   --  5.4*   ALBUMIN  --  2.5*  --   --   --   --   --   --  2.5*  --   --   --   --   --  2.8*   BILITOTAL  --  0.3  --   --   --   --   --   --  0.9  --   --   --   --   --  0.2   ALKPHOS  --  644*  --   --   --   --   --   --  861*  --   --   --   --   --  60   AST  --  235*  --   --   --   --   --   --  1,143*  --    --   --   --   --  22   ALT  --  601*  --   --   --   --   --   --  791*  --   --   --   --   --  12    < > = values in this interval not displayed.     CBC:   Recent Labs   Lab 08/09/22  0548 08/08/22  1630 08/08/22  0740 08/07/22  0842 08/06/22  0646   WBC 6.3  --  4.6 8.4 8.2   RBC 2.17*  --  2.08* 2.56* 2.60*   HGB 6.5* 7.3* 6.5* 7.7* 7.8*   HCT 21.7*  --  21.0* 26.2* 26.3*     --  101* 102* 101*   MCH 30.0  --  31.3 30.1 30.0   MCHC 30.0*  --  31.0* 29.4* 29.7*   RDW 17.8*  --  18.6* 18.3* 18.3*     --  242 255 223       INR:   Recent Labs   Lab 08/09/22  0548 08/08/22  1630 08/03/22  0633   INR 0.95 1.07 0.87       Glucose:   Recent Labs   Lab 08/09/22  0742 08/09/22  0548 08/09/22  0117 08/08/22  2101 08/08/22  1759 08/08/22  1222   * 219* 176* 153* 138* 146*       Blood Gas:   Recent Labs   Lab 08/04/22  0902 08/03/22  0633   PHV 7.42 7.39   PCO2V 71* 78*   PO2V 31 29   HCO3V 46* 47*   LINDY 18.7* 20.4*   O2PER 2 35       Culture Data No results for input(s): CULT in the last 168 hours.    Virology Data:   Lab Results   Component Value Date    FLUAH1 Not Detected 06/29/2022    FLUAH3 Not Detected 06/29/2022    IX5162 Not Detected 06/29/2022    IFLUB Not Detected 06/29/2022    RSVA Not Detected 06/29/2022    RSVB Not Detected 06/29/2022    PIV1 Not Detected 06/29/2022    PIV2 Not Detected 06/29/2022    PIV3 Not Detected 06/29/2022    HMPV Not Detected 06/29/2022       Historical CMV results (last 3 of prior testing):  Lab Results   Component Value Date    CMVQNT Not Detected 07/25/2022     No results found for: CMVLOG    Urine Studies    Recent Labs   Lab Test 08/01/22  0319 07/08/22  0831 07/05/22  1004   URINEPH 8.0* 5.5 5.5   NITRITE Negative Negative Negative   LEUKEST Negative Negative Negative   WBCU  --  1 5       Most Recent [place] Pulmonary Function Testing (FVC/FEV1 only)  FVC-Pre   Date Value Ref Range Status   04/29/2022 1.82 L    11/11/2021 2.17 L    06/14/2021 2.00 L       FVC-%Pred-Pre   Date Value Ref Range Status   04/29/2022 58 %    11/11/2021 70 %    06/14/2021 64 %      FEV1-Pre   Date Value Ref Range Status   04/29/2022 0.51 L    11/11/2021 0.53 L    06/14/2021 0.54 L      FEV1-%Pred-Pre   Date Value Ref Range Status   04/29/2022 20 %    11/11/2021 21 %    06/14/2021 21 %        IMAGING    Recent Results (from the past 48 hour(s))   XR Chest 2 Views    Narrative    Exam: XR CHEST 2 VIEWS, 8/8/2022 10:30 AM    Indication: interval follow up, lung transplant, bilateral chest tubes    Comparison: 8/6/2022    Findings:   Left upper extremity vascular catheter tip projects over the left  subclavian vein. Surgical changes of bilateral lung transplantation  with clamshell sternotomy wires and mediastinal surgical clips. Stable  bilateral loculated pleural effusions. No appreciable pneumothorax.  Stable streaky perihilar opacities.      Impression    Impression: Surgical changes of bilateral lung transplantation with  stable bilateral loculated pleural effusions.    CECI REGAN DO         SYSTEM ID:  [id number]   XR Abdomen 2 Views    Narrative    EXAMINATION:  XR ABDOMEN 2 VIEWS 8/8/2022 4:47 PM     COMPARISON: Gastrojejunostomy tube placement images 7/27/2022,  radiographs 7/16/2022, CT 7/15/2022.    HISTORY: Abd pain worse when feeds increased-- Want to rule out ulcer  perforation vs ileus    TECHNIQUE: Frontal upright and supine views of the abdomen.    FINDINGS: Gastrojejunostomy bulb projects over the stomach, tube tip  projects over the jejunum. No evidence of peritoneal free air. No  abnormally dilated loops of bowel. No pneumatosis or portal venous  gas.    Changes of bilateral lung transplant. Bilateral loculated pleural  effusions and atelectasis.      Impression    IMPRESSION: No radiographic evidence of peritoneal free air. No  abnormally dilated loops of bowel.    I have personally reviewed the examination and initial interpretation  and I agree with the  findings.    GABRIELLA SNELL MD         SYSTEM ID:  [id number]   US Abdomen Limited w Abd/Pelv Duplex Complete Port   Result Value    Radiologist flags (Urgent)     New common bile duct dilatation with potential mass    Narrative    EXAMINATION: US ABDOMEN LIMITED WITH DOPPLER COMPLETE PORTABLE  8/8/2022 5:56 PM     COMPARISON: CT chest 7/28/2022    HISTORY: New abdominal pain in setting of change in tube feeds but  with rising LFTs concerning for cholangitis versus hepatitis.    TECHNIQUE: The abdomen was scanned in standard fashion with  specialized ultrasound transducer(s) using both gray-scale, color  Doppler, and spectral flow techniques.    Findings:  Liver: The liver demonstrates normal homogeneous echotexture measuring  12.5 cm in length. No evidence of a focal hepatic mass.     Extrahepatic portal vein flow is antegrade at 24 cm/s.  Right portal vein flow is antegrade, measuring 26 cm/s.  Left portal vein flow is antegrade, measuring 33 cm/s.    Flow in the hepatic artery is towards the liver and:  94 cm/s peak systolic  0.65 resistive index.     The splenic vein is patent and flow is towards the liver.  The left,  middle, and right hepatic veins are patent with flow towards the IVC.  The IVC is patent with flow towards the heart.   The visualized aorta  is not dilated.    Gallbladder: Layering sludge in the gallbladder with borderline wall  thickening measuring up to 4 mm. No pericholecystic fluid, positive  sonographic Sánchez's sign or evidence for cholelithiasis    Bile Ducts: Intrahepatic biliary dilation with an echogenic  extrahepatic mass at the level of the common bile duct measuring 3.7 x  2.8 x 1.8 cm. The common bile duct measures 11 mm.    Pancreas: Visualized portions of the head and body of the pancreas are  unremarkable.     Kidneys: Both kidneys are of normal echotexture, without mass or  hydronephrosis.   Renal lengths: right- 8.1 cm.    Fluid: Small right pleural effusion.      Impression     Impression:   1.  Extrahepatic mass at the level of the common bile duct with  associated intrahepatic and common bile duct dilation measuring 3.7 x  2.8 x 1.8 cm. Differential includes malignancy including  cholangiocarcinoma. Prior PET/CT from July of last year did not show  any lesions in this area. It did however show a duodenal diverticulum.  Recommend follow-up abdominal liver MRI with MRCP or pancreatic mass  CT for further evaluation.  2.  Patent hepatic vasculature without sonographic evidence to suggest  hemodynamically significant stenosis.  3.  Layering gallbladder sludge without additional evidence to suggest  acute cholecystitis.  4.  Small right pleural effusion.    [Access Center: New common bile duct dilatation with potential mass  near the pancreas. MR of the abdomen with MRCP versus CT with  pancreatic mass protocol recommended.]    This report will be copied to the [facility] to ensure a  provider acknowledges the finding. Access Center is available Monday  through Friday 8am-3:30 pm.     I have personally reviewed the examination and initial interpretation  and I agree with the findings.    GABRIELLA SNELL MD         SYSTEM ID:  [id number]

## 2022-08-09 NOTE — PLAN OF CARE
"NURSING PROGRESS NOTE  Shift Summary      Date: August 8, 2022     Neuro/Musculoskeletal:  A&Ox4.   Cardiac:  SR.  VSS.     Respiratory:  Sating in the 90s on 1L NC. Pt occasionally removes but does desat, she will reapply O2 as needed.  GI/:  Adequate urine output.  LBM: 8/8 Frequent liquid stools. Black in appearance  Diet/Appetite:  Did not tolerate tube feeds at anything greater than 65ml overnight. NPO with ice chips today.  Pt to be NPO with no ice overnight for procedure tomorrow, 8/9. Pt aware and in agreement.   Activity:  Assist of standby. Able to get up to commode and walk into hallway with PT   Pain:  Abdominal pain 7/10 today. Which is \"different\" than before. Sofie wasn't able to specify difference but said it was more diffuse than previous.  Skin:  No new deficits noted.   LDAs + Drips/IVF:  Heparin running at 1000U/hour until approx 1630.   Protocols/Labs:      Pertinent Shift Updates:  Pt with abdominal pain today. Reports 4-5 stools today. Writer saw one this afternoon and it was dark black. Paged team to report. Hb 6.5 this morning, 1U PRBC given without issue. Pt to Chest CT. Returned and then had therapy and abdominal xray, followed by bedside abdominal ultrasound. PT given 10mg oxy per MAR with some relief. Pt anxious about test tomorrow, attempted to reassure, pt has had this test recently and did great. Pt appears to just be uncomfortable from stomach cramping and pain.       Rika Garcia RN  .................................................... August 8, 2022   7:49 PM  [facility] ([facility]): [facility] ICU (Unit 6D)         Goal Outcome Evaluation:  Problem: Bleeding (Gastrointestinal Bleeding)  Goal: Hemostasis  Outcome: Ongoing, Not Progressing    "

## 2022-08-09 NOTE — PROGRESS NOTES
Asked to follow-up on RUQ US by primary team. Ultrasound shows a new extra-hepatic mass with extrahepatic biliary dilatation.   On exam, patient reports no pain in RUQ and no tenderness on palpation.  Notified patient of the mass and plan for MRCP in AM. Ordered MRCP  - Monitor for signs of cholangitis. No fever or elevated wbc. If clinical signs of sepsis, will start on zosyn

## 2022-08-10 ENCOUNTER — ANESTHESIA EVENT (OUTPATIENT)
Dept: SURGERY | Facility: CLINIC | Age: 62
DRG: 007 | End: 2022-08-10
Payer: MEDICARE

## 2022-08-10 ENCOUNTER — APPOINTMENT (OUTPATIENT)
Dept: PHYSICAL THERAPY | Facility: CLINIC | Age: 62
DRG: 007 | End: 2022-08-10
Attending: THORACIC SURGERY (CARDIOTHORACIC VASCULAR SURGERY)
Payer: MEDICARE

## 2022-08-10 ENCOUNTER — APPOINTMENT (OUTPATIENT)
Dept: OCCUPATIONAL THERAPY | Facility: CLINIC | Age: 62
DRG: 007 | End: 2022-08-10
Attending: THORACIC SURGERY (CARDIOTHORACIC VASCULAR SURGERY)
Payer: MEDICARE

## 2022-08-10 LAB
ANION GAP SERPL CALCULATED.3IONS-SCNC: 3 MMOL/L (ref 7–15)
BILIRUB DIRECT SERPL-MCNC: 0.27 MG/DL (ref 0–0.3)
BUN SERPL-MCNC: 46 MG/DL (ref 8–23)
CALCIUM SERPL-MCNC: 9 MG/DL (ref 8.8–10.2)
CHLORIDE SERPL-SCNC: 97 MMOL/L (ref 98–107)
CREAT SERPL-MCNC: 1.36 MG/DL (ref 0.51–0.95)
DEPRECATED HCO3 PLAS-SCNC: 38 MMOL/L (ref 22–29)
ERYTHROCYTE [DISTWIDTH] IN BLOOD BY AUTOMATED COUNT: 17.4 % (ref 10–15)
GFR SERPL CREATININE-BSD FRML MDRD: 44 ML/MIN/1.73M2
GLUCOSE BLDC GLUCOMTR-MCNC: 105 MG/DL (ref 70–99)
GLUCOSE BLDC GLUCOMTR-MCNC: 112 MG/DL (ref 70–99)
GLUCOSE BLDC GLUCOMTR-MCNC: 123 MG/DL (ref 70–99)
GLUCOSE BLDC GLUCOMTR-MCNC: 151 MG/DL (ref 70–99)
GLUCOSE BLDC GLUCOMTR-MCNC: 159 MG/DL (ref 70–99)
GLUCOSE BLDC GLUCOMTR-MCNC: 163 MG/DL (ref 70–99)
GLUCOSE BLDC GLUCOMTR-MCNC: 165 MG/DL (ref 70–99)
GLUCOSE BLDC GLUCOMTR-MCNC: 165 MG/DL (ref 70–99)
GLUCOSE BLDC GLUCOMTR-MCNC: 169 MG/DL (ref 70–99)
GLUCOSE BLDC GLUCOMTR-MCNC: 182 MG/DL (ref 70–99)
GLUCOSE BLDC GLUCOMTR-MCNC: 196 MG/DL (ref 70–99)
GLUCOSE BLDC GLUCOMTR-MCNC: 213 MG/DL (ref 70–99)
GLUCOSE SERPL-MCNC: 111 MG/DL (ref 70–99)
HCT VFR BLD AUTO: 27.2 % (ref 35–47)
HCV RNA SERPL NAA+PROBE-ACNC: NOT DETECTED IU/ML
HGB BLD-MCNC: 8.4 G/DL (ref 11.7–15.7)
IGG SERPL-MCNC: 590 MG/DL (ref 610–1616)
LACTATE SERPL-SCNC: 0.5 MMOL/L (ref 0.7–2)
MAGNESIUM SERPL-MCNC: 2.7 MG/DL (ref 1.7–2.3)
MCH RBC QN AUTO: 30.2 PG (ref 26.5–33)
MCHC RBC AUTO-ENTMCNC: 30.9 G/DL (ref 31.5–36.5)
MCV RBC AUTO: 98 FL (ref 78–100)
PHOSPHATE SERPL-MCNC: 4.1 MG/DL (ref 2.5–4.5)
PLATELET # BLD AUTO: 287 10E3/UL (ref 150–450)
POTASSIUM SERPL-SCNC: 4.8 MMOL/L (ref 3.4–5.3)
POTASSIUM SERPL-SCNC: 4.8 MMOL/L (ref 3.4–5.3)
POTASSIUM SERPL-SCNC: 5.1 MMOL/L (ref 3.4–5.3)
POTASSIUM SERPL-SCNC: 5.7 MMOL/L (ref 3.4–5.3)
RBC # BLD AUTO: 2.78 10E6/UL (ref 3.8–5.2)
SODIUM SERPL-SCNC: 138 MMOL/L (ref 136–145)
WBC # BLD AUTO: 8.3 10E3/UL (ref 4–11)

## 2022-08-10 PROCEDURE — 250N000013 HC RX MED GY IP 250 OP 250 PS 637: Performed by: SURGERY

## 2022-08-10 PROCEDURE — 84132 ASSAY OF SERUM POTASSIUM: CPT | Performed by: HOSPITALIST

## 2022-08-10 PROCEDURE — 83605 ASSAY OF LACTIC ACID: CPT | Performed by: STUDENT IN AN ORGANIZED HEALTH CARE EDUCATION/TRAINING PROGRAM

## 2022-08-10 PROCEDURE — 94640 AIRWAY INHALATION TREATMENT: CPT

## 2022-08-10 PROCEDURE — 36592 COLLECT BLOOD FROM PICC: CPT | Performed by: STUDENT IN AN ORGANIZED HEALTH CARE EDUCATION/TRAINING PROGRAM

## 2022-08-10 PROCEDURE — 250N000011 HC RX IP 250 OP 636: Performed by: HOSPITALIST

## 2022-08-10 PROCEDURE — 85027 COMPLETE CBC AUTOMATED: CPT | Performed by: STUDENT IN AN ORGANIZED HEALTH CARE EDUCATION/TRAINING PROGRAM

## 2022-08-10 PROCEDURE — 99233 SBSQ HOSP IP/OBS HIGH 50: CPT | Mod: 24 | Performed by: INTERNAL MEDICINE

## 2022-08-10 PROCEDURE — 250N000012 HC RX MED GY IP 250 OP 636 PS 637: Performed by: NURSE PRACTITIONER

## 2022-08-10 PROCEDURE — 86833 HLA CLASS II HIGH DEFIN QUAL: CPT | Performed by: STUDENT IN AN ORGANIZED HEALTH CARE EDUCATION/TRAINING PROGRAM

## 2022-08-10 PROCEDURE — 83735 ASSAY OF MAGNESIUM: CPT | Performed by: STUDENT IN AN ORGANIZED HEALTH CARE EDUCATION/TRAINING PROGRAM

## 2022-08-10 PROCEDURE — 97116 GAIT TRAINING THERAPY: CPT | Mod: GP | Performed by: PHYSICAL THERAPIST

## 2022-08-10 PROCEDURE — 250N000013 HC RX MED GY IP 250 OP 250 PS 637: Performed by: STUDENT IN AN ORGANIZED HEALTH CARE EDUCATION/TRAINING PROGRAM

## 2022-08-10 PROCEDURE — 258N000001 HC RX 258: Performed by: HOSPITALIST

## 2022-08-10 PROCEDURE — 86832 HLA CLASS I HIGH DEFIN QUAL: CPT | Performed by: STUDENT IN AN ORGANIZED HEALTH CARE EDUCATION/TRAINING PROGRAM

## 2022-08-10 PROCEDURE — 36415 COLL VENOUS BLD VENIPUNCTURE: CPT | Performed by: HOSPITALIST

## 2022-08-10 PROCEDURE — 97535 SELF CARE MNGMENT TRAINING: CPT | Mod: GO | Performed by: OCCUPATIONAL THERAPIST

## 2022-08-10 PROCEDURE — 250N000013 HC RX MED GY IP 250 OP 250 PS 637: Performed by: HOSPITALIST

## 2022-08-10 PROCEDURE — 84100 ASSAY OF PHOSPHORUS: CPT | Performed by: STUDENT IN AN ORGANIZED HEALTH CARE EDUCATION/TRAINING PROGRAM

## 2022-08-10 PROCEDURE — 94640 AIRWAY INHALATION TREATMENT: CPT | Mod: 76

## 2022-08-10 PROCEDURE — 250N000011 HC RX IP 250 OP 636: Performed by: STUDENT IN AN ORGANIZED HEALTH CARE EDUCATION/TRAINING PROGRAM

## 2022-08-10 PROCEDURE — 250N000013 HC RX MED GY IP 250 OP 250 PS 637: Performed by: INTERNAL MEDICINE

## 2022-08-10 PROCEDURE — 120N000002 HC R&B MED SURG/OB UMMC

## 2022-08-10 PROCEDURE — 999N000157 HC STATISTIC RCP TIME EA 10 MIN

## 2022-08-10 PROCEDURE — 99233 SBSQ HOSP IP/OBS HIGH 50: CPT | Mod: GC | Performed by: STUDENT IN AN ORGANIZED HEALTH CARE EDUCATION/TRAINING PROGRAM

## 2022-08-10 PROCEDURE — 999N000007 HC SITE CHECK

## 2022-08-10 PROCEDURE — 250N000012 HC RX MED GY IP 250 OP 636 PS 637: Performed by: INTERNAL MEDICINE

## 2022-08-10 PROCEDURE — 36592 COLLECT BLOOD FROM PICC: CPT

## 2022-08-10 PROCEDURE — 250N000013 HC RX MED GY IP 250 OP 250 PS 637: Performed by: PHYSICIAN ASSISTANT

## 2022-08-10 PROCEDURE — 99233 SBSQ HOSP IP/OBS HIGH 50: CPT | Mod: 24 | Performed by: NURSE PRACTITIONER

## 2022-08-10 PROCEDURE — 82784 ASSAY IGA/IGD/IGG/IGM EACH: CPT | Performed by: STUDENT IN AN ORGANIZED HEALTH CARE EDUCATION/TRAINING PROGRAM

## 2022-08-10 PROCEDURE — 84132 ASSAY OF SERUM POTASSIUM: CPT

## 2022-08-10 PROCEDURE — 250N000009 HC RX 250: Performed by: NURSE PRACTITIONER

## 2022-08-10 PROCEDURE — 250N000012 HC RX MED GY IP 250 OP 636 PS 637: Performed by: PHYSICIAN ASSISTANT

## 2022-08-10 PROCEDURE — 80048 BASIC METABOLIC PNL TOTAL CA: CPT | Performed by: STUDENT IN AN ORGANIZED HEALTH CARE EDUCATION/TRAINING PROGRAM

## 2022-08-10 PROCEDURE — C9113 INJ PANTOPRAZOLE SODIUM, VIA: HCPCS | Performed by: STUDENT IN AN ORGANIZED HEALTH CARE EDUCATION/TRAINING PROGRAM

## 2022-08-10 PROCEDURE — 250N000013 HC RX MED GY IP 250 OP 250 PS 637: Performed by: NURSE PRACTITIONER

## 2022-08-10 PROCEDURE — 97530 THERAPEUTIC ACTIVITIES: CPT | Mod: GP | Performed by: PHYSICAL THERAPIST

## 2022-08-10 RX ORDER — FUROSEMIDE 10 MG/ML
20 INJECTION INTRAMUSCULAR; INTRAVENOUS ONCE
Status: COMPLETED | OUTPATIENT
Start: 2022-08-10 | End: 2022-08-10

## 2022-08-10 RX ORDER — NICOTINE POLACRILEX 4 MG
15-30 LOZENGE BUCCAL
Status: DISCONTINUED | OUTPATIENT
Start: 2022-08-10 | End: 2022-08-10

## 2022-08-10 RX ORDER — DEXTROSE MONOHYDRATE 25 G/50ML
25 INJECTION, SOLUTION INTRAVENOUS ONCE
Status: COMPLETED | OUTPATIENT
Start: 2022-08-10 | End: 2022-08-10

## 2022-08-10 RX ORDER — DEXTROSE MONOHYDRATE 25 G/50ML
25-50 INJECTION, SOLUTION INTRAVENOUS
Status: DISCONTINUED | OUTPATIENT
Start: 2022-08-10 | End: 2022-08-10

## 2022-08-10 RX ADMIN — ACETYLCYSTEINE 2 ML: 200 INHALANT RESPIRATORY (INHALATION) at 19:43

## 2022-08-10 RX ADMIN — HYDROXYZINE HYDROCHLORIDE 50 MG: 25 TABLET ORAL at 20:10

## 2022-08-10 RX ADMIN — HUMAN INSULIN 7 UNITS: 100 INJECTION, SOLUTION SUBCUTANEOUS at 07:03

## 2022-08-10 RX ADMIN — NYSTATIN 1000000 UNITS: 100000 SUSPENSION ORAL at 18:24

## 2022-08-10 RX ADMIN — NYSTATIN 1000000 UNITS: 100000 SUSPENSION ORAL at 13:19

## 2022-08-10 RX ADMIN — NYSTATIN: 100000 CREAM TOPICAL at 08:27

## 2022-08-10 RX ADMIN — PREDNISONE 10 MG: 10 TABLET ORAL at 20:10

## 2022-08-10 RX ADMIN — MYCOPHENOLATE MOFETIL 750 MG: 200 POWDER, FOR SUSPENSION ORAL at 20:10

## 2022-08-10 RX ADMIN — ACETYLCYSTEINE 2 ML: 200 INHALANT RESPIRATORY (INHALATION) at 08:07

## 2022-08-10 RX ADMIN — DAPSONE 50 MG: 25 TABLET ORAL at 13:18

## 2022-08-10 RX ADMIN — NYSTATIN 1000000 UNITS: 100000 SUSPENSION ORAL at 08:24

## 2022-08-10 RX ADMIN — INSULIN ASPART 3 UNITS: 100 INJECTION, SOLUTION INTRAVENOUS; SUBCUTANEOUS at 18:25

## 2022-08-10 RX ADMIN — LEVALBUTEROL HYDROCHLORIDE 1.25 MG: 1.25 SOLUTION RESPIRATORY (INHALATION) at 08:07

## 2022-08-10 RX ADMIN — NYSTATIN 1000000 UNITS: 100000 SUSPENSION ORAL at 23:16

## 2022-08-10 RX ADMIN — PANTOPRAZOLE SODIUM 40 MG: 40 INJECTION, POWDER, FOR SOLUTION INTRAVENOUS at 20:10

## 2022-08-10 RX ADMIN — CALCIUM CARBONATE 600 MG (1,500 MG)-VITAMIN D3 400 UNIT TABLET 1 TABLET: at 18:24

## 2022-08-10 RX ADMIN — THERA TABS 1 TABLET: TAB at 13:18

## 2022-08-10 RX ADMIN — OXYCODONE HYDROCHLORIDE 10 MG: 5 TABLET ORAL at 00:38

## 2022-08-10 RX ADMIN — MYCOPHENOLATE MOFETIL 750 MG: 200 POWDER, FOR SUSPENSION ORAL at 08:25

## 2022-08-10 RX ADMIN — INSULIN ASPART 2 UNITS: 100 INJECTION, SOLUTION INTRAVENOUS; SUBCUTANEOUS at 13:19

## 2022-08-10 RX ADMIN — Medication 1 PACKET: at 13:19

## 2022-08-10 RX ADMIN — METOPROLOL TARTRATE 50 MG: 50 TABLET, FILM COATED ORAL at 08:25

## 2022-08-10 RX ADMIN — FUROSEMIDE 20 MG: 10 INJECTION, SOLUTION INTRAVENOUS at 08:26

## 2022-08-10 RX ADMIN — TACROLIMUS 5 MG: 5 CAPSULE ORAL at 08:28

## 2022-08-10 RX ADMIN — Medication 0.05 MG: at 08:27

## 2022-08-10 RX ADMIN — INSULIN ASPART 2 UNITS: 100 INJECTION, SOLUTION INTRAVENOUS; SUBCUTANEOUS at 20:11

## 2022-08-10 RX ADMIN — VALGANCICLOVIR HYDROCHLORIDE 450 MG: 50 POWDER, FOR SOLUTION ORAL at 08:25

## 2022-08-10 RX ADMIN — TACROLIMUS 5 MG: 5 CAPSULE ORAL at 18:24

## 2022-08-10 RX ADMIN — NYSTATIN: 100000 CREAM TOPICAL at 20:12

## 2022-08-10 RX ADMIN — LEVALBUTEROL HYDROCHLORIDE 1.25 MG: 1.25 SOLUTION RESPIRATORY (INHALATION) at 19:43

## 2022-08-10 RX ADMIN — DEXTROSE MONOHYDRATE 25 G: 25 INJECTION, SOLUTION INTRAVENOUS at 07:04

## 2022-08-10 RX ADMIN — ONDANSETRON 4 MG: 4 TABLET, ORALLY DISINTEGRATING ORAL at 08:25

## 2022-08-10 RX ADMIN — PREDNISONE 12.5 MG: 2.5 TABLET ORAL at 08:25

## 2022-08-10 RX ADMIN — METOPROLOL TARTRATE 50 MG: 50 TABLET, FILM COATED ORAL at 20:10

## 2022-08-10 RX ADMIN — PANTOPRAZOLE SODIUM 40 MG: 40 INJECTION, POWDER, FOR SOLUTION INTRAVENOUS at 08:24

## 2022-08-10 RX ADMIN — CALCIUM CARBONATE 600 MG (1,500 MG)-VITAMIN D3 400 UNIT TABLET 1 TABLET: at 08:30

## 2022-08-10 RX ADMIN — OXYCODONE HYDROCHLORIDE 10 MG: 5 TABLET ORAL at 20:10

## 2022-08-10 ASSESSMENT — ENCOUNTER SYMPTOMS: DYSRHYTHMIAS: 1

## 2022-08-10 ASSESSMENT — ACTIVITIES OF DAILY LIVING (ADL)
ADLS_ACUITY_SCORE: 32
ADLS_ACUITY_SCORE: 33
ADLS_ACUITY_SCORE: 32
ADLS_ACUITY_SCORE: 32
ADLS_ACUITY_SCORE: 33
ADLS_ACUITY_SCORE: 32
ADLS_ACUITY_SCORE: 33
ADLS_ACUITY_SCORE: 32
ADLS_ACUITY_SCORE: 32
ADLS_ACUITY_SCORE: 33

## 2022-08-10 ASSESSMENT — LIFESTYLE VARIABLES: TOBACCO_USE: 1

## 2022-08-10 ASSESSMENT — COPD QUESTIONNAIRES
COPD: 1
CAT_SEVERITY: SEVERE

## 2022-08-10 NOTE — PROGRESS NOTES
Shift Summary: 8/10/22     Neuro: A&Ox4. Stand by assist in the room. Minimal assistance with repositioning.     Cardiac: NSR on tele.      Resp: 1L nasal cannula through the night. Denies feeling short of breath. Diminished, clear lung sounds.     GI/: Last bowel movement 8/8/22. Voiding in bedside commode with 1 assist to transfer with adequate UO. NPO with TF's on hold.     Skin: Redness to bottom and vinayak area. Old CT sites. No new deficits.      PRN: Oxycodone every 4 hours for pain relief. At times complains of abdominal discomfort but subsides with Oxy.    Plan: MRCP last night 8/9 awaiting results. EGD ordered for today pending AM potassium. Goal of less then 5 per GI to proceed with EGD.    David Navarro RN on 8/10/2022 at 4:35 AM

## 2022-08-10 NOTE — PROGRESS NOTES
CLINICAL NUTRITION SERVICES - REASSESSMENT NOTE     Nutrition Prescription    RECOMMENDATIONS FOR MDs/PROVIDERS TO ORDER:  - Total daily fluids/adjustments per MD  - Minimize disruptions to EN regimen    Malnutrition Status:    Patient does not meet two of the established criteria necessary for diagnosing malnutrition but is at risk for malnutrition    Recommendations already ordered by Registered Dietitian (RD):  Restart current EN as able: Novasource Renal @ 35 ml/hr (840 ml) + 1 pkt Prosource    Future/Additional Recommendations:  - Monitor tolerance of EN   - Monitor for diet advancement, provide nutrition education regarding gastroparesis as able.      EVALUATION OF THE PROGRESS TOWARD GOALS   Diet: NPO  Nutrition Support: Novasource Renal @ 35 ml/hr (840 ml) + 1 pkt Prosource daily provides 1720 kcal (30 kcal/kg), 87 g pro (1.5 g/kg), 154 g CHO, 602 ml free water, and 0 g fiber daily   Free Water: 30 mL q4 hours  Intake: Pt was on Osmolite 1.5 from 8/6-8/8, changed back to Novasource Renal due to high potassium levels. Attempted to cycle TF on 8/7, pt did not tolerate. TF on hold today and yesterday for EGD.   7 day EN intake: 2670 mL Novasource Renal and 1425 mL Osmolite 1.5 + 1 pkt Prosource/day. Provided: 1108 kcal (20 kcal/kg) and 59 g pro (1 g/kg). Meets 79% estimated energy and protein needs.      NEW FINDINGS   Weight: 67.9 kg on 8/8, weight up from admit weight. Variations weight likely due to standing vs bed scale and fluid shifts.     Labs:   K+ 4.8 (WNL)  Cr 1.36 (H)  Mg 2.7 (H)  Phos 4.1 (WNL)    Meds:   Caltrate  Novolog- correction scale   Lantus Pen- held   Thera-vit  Zofran     GI: Plan for EGD today     MALNUTRITION  % Intake: No decreased intake noted- EN  % Weight Loss: None noted  Subcutaneous Fat Loss: None observed  Muscle Loss: Temporal, Thoracic region (clavicle, acromium bone, deltoid, trapezius, pectoral), Upper arm (bicep, tricep), Lower arm  (forearm) and Upper leg (quadricep,  hamstring): mild   Fluid Accumulation/Edema: Does not meet criteria- trace  Malnutrition Diagnosis: Patient does not meet two of the established criteria necessary for diagnosing malnutrition but is at risk for malnutrition    Previous Goals   Total avg nutritional intake to meet a minimum of 25 kcal/kg and 1.3 g PRO/kg daily (per dosing wt 56 kg).  Evaluation: Not met    Previous Nutrition Diagnosis  Inadequate oral intake related to NPO status as evidenced by need for EN to meet 100% nutrition needs.    Evaluation: No change    CURRENT NUTRITION DIAGNOSIS  Inadequate oral intake related to NPO status as evidenced by need for EN to meet 100% nutrition needs, EN held currently for procedure       INTERVENTIONS  Implementation  Enteral Nutrition - Continue as medically appropriate.     Goals  Total avg nutritional intake to meet a minimum of 25 kcal/kg and 1.3 g PRO/kg daily (per dosing wt 56 kg).    Monitoring/Evaluation  Progress toward goals will be monitored and evaluated per protocol.    Loida Norris, RD, LD  6D RD pager [phone]  Weekend/ED RD pager [phone]

## 2022-08-10 NOTE — PLAN OF CARE
Goal Outcome Evaluation:    Plan of Care Reviewed With: patient     Overall Patient Progress: no change    Outcome Evaluation: Pt remains NPO, restart EN as medically appropriate.

## 2022-08-10 NOTE — PLAN OF CARE
NURSING PROGRESS NOTE  Shift Summary      Date: August 9, 2022     Neuro/Musculoskeletal:  A&Ox4. Pleasant but anxious about possible procedure this morning. Discussed, answered questions and attempted reassure pt.  Cardiac:  SR/ST.  VSS.     Respiratory:  Sating in the 90s on 1L NC, pt attempting to wean at times taking O2 off and will desat. Pt able to replace O2 appropriately.  GI/:  Adequate urine output.  LBM: 8/8/22  Diet/Appetite:  NPO for endoscopy procedure. Meds given in PEJ, tube feeds held   Activity:  Assist of stand by.    Pain:  Pain meds given x1 today for minimal pain in abdomen. Pt reports significant improvement in pain.  Skin:  No new deficits noted.   LDAs + Drips/IVF:    Protocols/Labs:  K 5.5 this morning after being shifter x2 overnight. Hb 6.5    Pertinent Shift Updates:  Pt needing 1UPRBC this morning and to be shifted prior to endoscopy. Writer notified at 0900 that pt needed to be ready for transport at 1015 with shift done and recheck of K drawn. Insulin not on unit, pharmacist assisted with getting to floor ASAP. GI then requested blood be mostly finished running prior to arrival. Writer started blood and when unit almost finished shifted pt. This was done around 1130. Timed redraw of K had been scheduled for noon. This appears to have been cancelled, 1215 writer called lab and re-ordered K as stat. Drawn by 1240, endoscopy and GI wanted result prior to pt transport. Pt K returned at 1330 5.1. Per GI this remained to high and procedure cancelled for day.      Writer spoke with swing provider and night RN. Endoscopy tentatively scheduled for sometime tomorrow 8/10. Will need CBC and BMP drawn around 4am, provider attempted to schedule as a timed draw. Not clear if lab will draw CBC as timed, it may need to be K as a timed draw. Night RN aware to monitor and follow up with lab. Once results are back will need to get orders to shift or replace K and Hb as necessary. Hope to have these  completed earlier in morning so pt can go to procedure.    Discussed day with Charity, pts daughter. No new updates since procedures and MRCP not performed. Charity would appreciate update tomorrow 8/10 after procedures.       Rika Garcia RN  .................................................... August 9, 2022   7:28 PM  [facility] ([facility] (Unit 6D)            Goal Outcome Evaluation:  Problem: Adjustment to Illness (Gastrointestinal Bleeding)  Goal: Optimal Coping with Acute Illness  Outcome: Ongoing, Progressing     Problem: Bleeding (Gastrointestinal Bleeding)  Goal: Hemostasis  Outcome: Ongoing, Progressing

## 2022-08-10 NOTE — PROGRESS NOTES
[facility]    Medicine Progress Note - Hospitalist Service, GOLD TEAM 10    Date of Admission:  6/28/2022    Assessment & Plan    Sofie Rodriguez is a 62 year old female admitted on 6/28/2022. She has PMH of end stage COPD s/p b/l lung transplant on 6/28/2022, HTN, HFpEF, h/o hepC, osteopenia, and former methamphetamine use, and she was transferred to [place] [address] [facility] from MICU on 7/15/2022. She was admitted to the MICU on 7/13/20222 with prior hospital course complicated by left upper extremity acute limb ischemia s/p left radial thrombectomy on 7/1/2022. She was primarily treated for acute hypercapnic respiratory failure on intermittent BIPAP with worsening BACILIO while in the MICU. Breathing is improving, but patient has severely delayed gastric emptying found on NM study. PEGJ placed  Chest tubes out. Still needing to advance feeds. GI following, s/p abdominal MRI which revealed protinaceous /hemmorhagic GB cyst. GI planning ERCP and EGD 8/11/22    Today's Plan:  -discussed with GI - plan for EGD and ERCP tomorrow, wanting K (<5.0) improved prior  -K check ~ 4 AM and shift if needed in the AM  -holding long-acting insulin due to holding tube feeds  -holding heparin drip  -Resume tube feeds with NPO/hold TF at MN    Acute Hepatitis  Extrahepatic mass  Dramatic rise in LFT's-- AST > ALT; ALT 700s, AST 1000s; bili rising 0.9 from 0.2. No fever. MRCP: gallbladder contains a moderate amount of proteinaceous/hemorrhagic material, which extends  into the gallbladder neck, corresponding to the findings on comparison  ultrasound  -trend liver labs  -GI consulted, panc/bili team following  -tylenol only at BID  - ERCP planned for tomorrow    End stage COPD s/p BSLT 6/28/2022  Acute hypercapnic hypoxic respiratory failure (improving)  EBV Viremia  likely 2/2 decreased central respiratory drive vs respiratory muscle weakness and some pulmonary edema / fluid Transplanted 6/28. formal  SNIFF test was performed on 7/14 showed R hemidiaphragm palsy. Of note transplanted lungs were also considered small for body size and could contribute to decreased respiratory compensation for hypercarbia. VBG improving gradually  - oxycodone 5-10 mg q4h PRN  - encourage activity and getting up in bed; PT/OT participation  - encourage chest physiotherapy QID  - weaned off Bipap at night  Immunosuppression:  - Prednisone per pulm  - Tacrolimus per pulm  -  BID  Prophylaxis:  - CMV - Valgancyclovir  - Thrush - PO nysatin  - PJP - TMP-SMX; dapsone started 7/18 at 50mg qMWF     Severe Gastroparesis - gastric emptying study 7/20  - gastric emptying study 7/20 showed delayed gastric emptying with retention of 95% of stomach contents after 4 hours;  - 7/27: PEGJ placed- tolerating tube feeds via J  Feeding regimen: J tube feeds continuous-- transitioned formula to non-renal formula as her kidneys have improved/needs more volume, appreciate RD assistance as well-- holding feeds while dealing with abdominal pain, melena     Peptic ulcer at the pyloric junction with acute blood loss anemia in the setting of acute on chronic anemia-- had acute blood loss anemia after transplant-- had stabilized. Gradual hgb downtrend, then after inspection bronch her G tube was hooked back up to gravity and large brown/black output seen. EGD on 8/3 showed pyloric junction ulcer and swelling causing gastric outlet objection  -GI consult, appreciate assistance  -BID PPI   -NPO awaiting EGD for melena (8/11)  -okay to resume heparin drip 8/4- not bleeding on this as of 8/5-8/7- HOLDING due to new melena  - hgb transfusion 8/3-- hgb stable    Pleural Effusion, Right and Left  Left chest tube removed 8/4, right chest tube remaining, appreciate CVTS assistance  - daily CXR     Hypotension, resolved  H/o HTN  H/o HFpEF  Likely vasoplegia post operatively. Last echo 7/7 normal EF with good LV function. S/p hydrocortisone 7/6-7/9 and  fludrocortisone 7/6-7/11 without significant improvement.  - off midorine 7/19  - Holding PTA lasix 20mg daily-- diuresis intermittently    BACILIO-- recurrent, improved 8/1-- likely due to supratherapeutic tacrolimus  Hypermagnesemia  Hyperphosphatemia  Metabolic alkalosis  Baseline renal function was normal prior to surgery. New onset renal failure after transplant, likely multifactorial due to pre-renal hypotension, less likely nephrotoxic agents. Overall kidney function improving. Today, Cr fluctuating but BUN increasing, with unclear urine output (7/22).   - HD cath removed 7/22, trend metabolites    C.diff - vanco started 7/12, course completed  -rechecked due to diarrhea (8/5)-- negative on 8/6  -Plan to clean room when patient goes to EGD/ERCP on 8/11  -Can come off precautions after room is cleaned per Infection control    Tachyarrthymia  Paroxysmal afib  Paroxysmal aflutter/AT  SVT vs afib.Had an occurrence during HD on 7/14. Had afib with RVR to 200s on 7/17. EP consult placed.asmyptomatic and stable during episodes. Aflutter episode (7/17) with transfer. Vagal maneuver responsive at time. No recent tachyarrhythmia episodes (7/22).   - Per EP: patient has had episodes of possible flutter (vs AT with 2:1 conduction) and afib with RVR  - heparin drip -- eventual plan to transition to DOAC after PEGJ placement and chest tubes resolved (CHADS-VASc score of 2)  --HOLDING heparin drip 8/8 due to melena  - On telemetry  - On metoprolol tartrate 25mg BID-- increasing to 50 BID 8/7  - Discharge on ziopatch for 14 days with EP follow up in 1-2 months after     Stress-induced/steroid induced hyperglycemia with intermittent hypoglycemia (8/3)  Has been controlled on 13-15 units of glargine BID  - on 7 units BID  8/5 since she is tapering steroid and has been NPO-- holding long acting given NPO/holding tube feeds  -- will continue to monitor her needs     Incidental IPMN  Found on MRCP 8/9/22. Cystic foci in the  pancreatic head measuring 4 x 3 mm superiorly and 4  x 3 mm inferiorly. Per [facility] guidelines on IPMN:  - Follow up imaging in 6 months to 1 year, then lengthen interval to 2-3 years if no change       Diet: Adult Formula Drip Feeding: Continuous Novasource Renal; Jejunostomy; Goal Rate: 35 mL/hr- continuous; mL/hr; From: 8:00 PM; 8:00 PM; Medication - Feeding Tube Flush Frequency: At least 15-30 mL water before and after medication administration and...  NPO per Anesthesia Guidelines for Procedure/Surgery Except for: Meds    DVT Prophylaxis: SCDs  Dong Catheter: Not present  Central Lines: PRESENT       Cardiac Monitoring: None  Code Status: Full Code      Disposition Plan      Expected Discharge Date: 08/15/2022    Discharge Delays: Other (Add Comment)  Destination: home  Discharge Comments: TCU,        The patient's care was discussed with the Bedside Nurse, Patient and pulm, GI luminal and GI hepatology and nutrition Consultant.    Kirby Voss, DO  Internal Medicine PGY-2    Discussed with Dr. Matthew    ______________________________________________________________________    Interval History   Feeling ok this morning. Denies abdominal pain or burning. Understands plan for EGD and ERCP tomorrow.     Data reviewed today: I reviewed all medications, new labs and imaging results over the last 24 hours. I personally reviewed the chest x-ray image(s) showing chest tubes are out; post surgical changes.    Physical Exam   Vital Signs: Temp: 98.7  F (37.1  C) Temp src: Oral BP: 130/73 Pulse: 102   Resp: 22 SpO2: 100 % O2 Device: Nasal cannula Oxygen Delivery: 1 LPM  Weight: 149 lbs 9.6 oz  Constitutional: Resting in bed.  Head: Normocephalic. Atraumatic.   EENT: No conjunctival injection or icterus. Nares patent. Moist mucous membranes.   Cardiovascular: Regular rate and rhythm. No murmurs, clicks, gallops or rubs. No edema  Respiratory: Clear to auscultation without wheezes or crackles. Normal respiratory  rate and effort.   Gastrointestinal: Abdomen diffusely tender throughout. Bowel sounds active. G tube with very little output - some dark brown flakes. No tube feeds running.   Musculoskeletal: extremities normal- no gross deformities noted and normal muscle tone  Skin: no suspicious lesions, rashes, jaundice, or cyanosis   Psychiatric: mentation appears normal and affect normal/bright      Data   Recent Labs   Lab 08/10/22  1145 08/10/22  1045 08/10/22  0948 08/10/22  0943 08/10/22  0650 08/10/22  0453 08/09/22  2310 08/09/22  2213 08/09/22  1704 08/09/22  1258 08/09/22  0742 08/09/22  0548 08/08/22  1759 08/08/22  1630 08/08/22  0916 08/08/22  0740   WBC  --   --   --   --   --  8.3  --   --   --   --   --  6.3  --   --   --  4.6   HGB  --   --   --   --   --  8.4*  --  8.5*  --   --   --  6.5*  --  7.3*  --  6.5*   MCV  --   --   --   --   --  98  --   --   --   --   --  100  --   --   --  101*   PLT  --   --   --   --   --  287  --   --   --   --   --  239  --   --   --  242   INR  --   --   --   --   --   --   --   --   --   --   --  0.95  --  1.07  --   --    NA  --   --   --   --   --  138  --   --   --   --   --  136  --   --   --  142   POTASSIUM  --   --  4.8  4.8  --   --  5.7*  --   --   --  5.1  --  5.5*  5.5*   < > 6.1*  --  5.5*   CHLORIDE  --   --   --   --   --  97*  --   --   --   --   --  95*  --   --   --  99   CO2  --   --   --   --   --  38*  --   --   --   --   --  39*  --   --   --  41*   BUN  --   --   --   --   --  46.0*  --   --   --   --   --  55.6*  --   --   --  63.9*   CR  --   --   --   --   --  1.36*  --   --   --   --   --  1.41*  --   --   --  1.23*   ANIONGAP  --   --   --   --   --  3*  --   --   --   --   --  2*  --   --   --  2*   ESTUARDO  --   --   --   --   --  9.0  --   --   --   --   --  8.9  --   --   --  8.9   * 169*  --  159*   < > 111*   < >  --    < >  --    < > 219*   < >  --    < > 116*   ALBUMIN  --   --   --   --   --   --   --   --   --   --   --  2.5*  --    --   --  2.5*   PROTTOTAL  --   --   --   --   --   --   --   --   --   --   --  4.8*  --   --   --  5.0*   BILITOTAL  --   --   --   --   --   --   --   --   --   --   --  0.3  --   --   --  0.9   ALKPHOS  --   --   --   --   --   --   --   --   --   --   --  644*  --   --   --  861*   ALT  --   --   --   --   --   --   --   --   --   --   --  601*  --   --   --  791*   AST  --   --   --   --   --   --   --   --   --   --   --  235*  --   --   --  1,143*   LIPASE  --   --   --   --   --   --   --   --   --   --   --   --   --   --   --  18    < > = values in this interval not displayed.     Recent Results (from the past 24 hour(s))   XR Chest 2 Views    Narrative    Chest 2 views    Indication interval follow-up, lung transplant comment bilateral chest  tubes    COMPARISON: Yesterday    FINDINGS: Clamshell sternotomy from prior bilateral lung transplant  again noted. Calcification of the aortic knob. Heart size normal.  Bilateral pleural effusions unchanged with possible loculated  component especially on the left. No ectopic air collections in the  interim. Perihilar streaky opacities unchanged.      Impression    IMPRESSION: Postoperative bilateral lung transplant again noted with  continued pleural effusions and perihilar streaky prominence which  could indicate atelectasis or edema. No acute interval changes.    ALVINA HARRY MD         SYSTEM ID:  [id number]   MR Abdomen MRCP w/o & w Contrast    Narrative    Exam: MR ABDOMEN MRCP W/O & W CONTRAST, 8/10/2022 7:59 AM    Indication: Extra-hepatic mass seen on US    Comparison: 8/8/2022, 7/15/2010    Technique: Images were acquired with and without intravenous  gadolinium contrast through the upper abdomen. The following MR images  were acquired without intravenous contrast: TrueFISP, multiplanar  T2-weighted, axial T1 in/out of phase, T2-weighted MRCP images, axial  diffusion-weighted and axial apparent diffusion coefficient.  T1-weighted images were obtained  before contrast at the multiple time  points following contrast injection. 3-D reformatted images were  generated by the technologist. Contrast dose: 7.5mL Gadavist    FINDINGS:    Liver: Normal    Gallbladder/Bile Ducts: Slightly increased mild intrahepatic and  moderate extrahepatic biliary dilation with common hepatic duct  measuring up to 21 mm (series 4, image 9) and the common bile duct  measuring up to 11 mm with level of transition at the gallbladder neck  which bulges medially into the duct. The gallbladder is hydropic and  contains moderate amount of T1 hyperintense material, which extends  into the bladder neck with folding of the gallbladder wall at the  transition between the neck and body. No filling defect within the  intra or extrahepatic biliary tree.    Spleen: Normal    Kidneys: Subcentimeter right renal cortical cysts.    Adrenal glands: Normal    Pancreas: No atrophy. Nondilated pancreatic duct.  Cystic foci in the pancreatic head measuring 4 x 3 mm superiorly and 4  x 3 mm inferiorly (series 15, image 48 and 50 respectively).    Bowel: Nondilated.  Percutaneous gastrojejunostomy tube with tip in the proximal jejunum  in the right mid abdomen.    Lymph nodes: No adenopathy    Blood vessels: Patent portal, splenic and superior mesenteric veins  without thrombus, Conventional hepatic arterial anatomy    Lung bases: Loculated bilateral pleural effusions. Basilar opacities.    Bones and soft tissues: No acute osseous abnormality    Mesentery and abdominal wall: No hernia    Ascites: Small volume      Impression    IMPRESSION:   1.  Slightly increased moderate extrahepatic and mild intrahepatic  biliary dilation with the common hepatic duct measuring up to 2.1 cm  and the common bile duct measuring up to 1.1 cm in diameter with  transition in the upper common bile duct at the level of the hydropic  gallbladder, which bulges medially. The gallbladder contains a  moderate amount of  proteinaceous/hemorrhagic material, which extends  into the gallbladder neck, corresponding to the findings on comparison  ultrasound. Consider HIDA scan as clinically warranted to assess for  acute cholecystitis. There is no solid mass as clinically queried.  2.  Loculated bilateral pleural effusions.  3.  Small volume ascites.  4.  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 Recommend follow-up as described below.    [facility] recommendations for asymptomatic pancreatic  cysts, modified from international consensus guidelines*   Size of largest cyst:   Less than 1 cm: Follow-up imaging in 6 months to 1 year, then lengthen  interval to 2-3 years if no change.  1-2 cm: Follow-up imaging at 6 months, then yearly for 2 years, then  lengthen interval if no change.   The optimal initial study or first follow-up exam is MRI/MRCP  performed with intravenous gadolinium contrast to allow full cyst  characterization. Once characterized, contrast is optional on  follow-up MRIs. If MRI is contraindicated, CT with contrast is  recommended.   GI consultation for possible endoscopic ultrasound is recommended for  cysts with the following features: Size > 2 cm, >20% growth on  followup, wall thickening or enhancement, mural nodule, duct > 5 mm,  or abrupt change in duct caliber with distal atrophy. GI or surgical  consultation is also recommended for symptomatic cysts.   *Reference: International Consensus Guidelines for Management of IPMN  and MCN of the pancreas. Pancreatology: 12:(2012); 183-197.    I have personally reviewed the examination and initial interpretation  and I agree with the findings.    CECI REGAN DO         SYSTEM ID:  [id number]     Medications     dextrose Stopped (07/15/22 1801)     [Held by provider] heparin Stopped (08/08/22 1645)       [Held by provider] acetaminophen  975 mg Per J Tube 2 times daily     acetylcysteine  2 mL Nebulization BID     [Held  by provider] aspirin  81 mg Per J Tube Daily     calcium carbonate 600 mg-vitamin D 400 units  1 tablet Per J Tube BID w/meals     dapsone  50 mg Per J Tube Once per day on Mon Wed Fri     dextrose 10%  300 mL Intravenous Once     fludrocortisone  0.05 mg Per J Tube Daily     insulin aspart  1-12 Units Subcutaneous Q4H     [Held by provider] insulin glargine  7 Units Subcutaneous BID     levalbuterol  1.25 mg Nebulization 2 times daily     metoprolol tartrate  50 mg Per Feeding Tube BID     multivitamin, therapeutic  1 tablet Per Feeding Tube Daily     mycophenolate  750 mg Per J Tube BID     nystatin   Topical BID     nystatin  1,000,000 Units Swish & Swallow 4x Daily     ondansetron  4 mg Oral Daily     [Held by provider] pantoprazole  40 mg Per J Tube BID     pantoprazole (PROTONIX) IV  40 mg Intravenous BID     predniSONE  12.5 mg Per J Tube QAM    And     predniSONE  10 mg Per J Tube QPM     protein modular  1 packet Per Feeding Tube Daily     sodium chloride (PF)  10 mL Intracatheter Q8H     sodium chloride (PF)  10 mL Intracatheter Q8H     sodium chloride (PF)  3 mL Intracatheter Q8H     tacrolimus  5 mg Per J Tube BID IS     valGANciclovir  450 mg Oral or Feeding Tube Once per day on Mon Wed Fri

## 2022-08-10 NOTE — PROGRESS NOTES
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 She was found to have delayed gastric emptying on GES and underwent percutaneous GJ tube placement by IR on 7/27/22. GI consulted for development of G tube output concerning for upper GI bleed.      Underwent upper endoscopy evaluation 8/3/22 which showed large amount of fluid and solid food in stomach limiting visualization of gastric mucosa. A clean based ulcer was found at the pyloric channel suspicious for the source of bleeding although no active bleeding. No concerns with inspection of G tube.     Patient doing well until 8/8/22 when she began to have melena and Hgb drop to 6.5 concerning for recurrent bleeding. Planned for EGD today however potassium elevated to 6.1-->5.5 overnight. Given patient's hemodynamic stability, we discussed optimizing her hemoglobin and electrolytes prior to proceeding with EGD. Receiving insulin/dextrose for shifting and furosemide today.     Also with acute rise in transaminases with unclear etiology. No known hypotensive episodes. Abdominal US with dopplers negative for thrombus but did show extrahepatic mass with CBD and intrahepatic duct dilation. Unlikely this would cause drastic rise in transaminases. Normal bilirubin. LFTs improving and MRCP pending.         RECOMMENDATIONS:  - plan for EGD 8/10  - strict NPO  - hold TFs at midnight     - continue IV PPI BID  - holding anticoagulation in setting of acute bleeding.  - Goal potassium for endoscopic procedure <5; appreciate medicine team's help in optimization prior procedure.   - repeat Hgb in AM         Outpatient GI follow up:   -  Repeat upper endoscopy in 8 weeks (GI will arrange) to  "check healing of ulcer. Pending clinical picture and endoscopic findings may need to consider pyloric dilation if stricture develops from current inflammation.      The patient was discussed and plan agreed upon with GI staff, Dr. Tracie Arroyo MD  Gastroenterology Fellow - PGY4  [facility]   Page Me    _______________________________________________________________      Subjective:   Nursing notes and 24hr events reviewed. Patient's hemoglobin low again this morning. Potassium elevated and requiring shifting. Patient reports previous abdominal pain now resolved. No bowel movement since yesterday No nausea or vomiting.     ROS:   4 pt ROS negative unless noted in subjective.     Objective:  Blood pressure 129/72, pulse 101, temperature 98.8  F (37.1  C), temperature source Oral, resp. rate 18, height 1.575 m (5' 2\"), weight 67.9 kg (149 lb 9.6 oz), SpO2 100 %, not currently breastfeeding.  Gen: lying in bed; appears comfortable.   HEENT: NCAT. Conjunctiva clear. Sclera anicteric  Resp: nonlabored breathing   Abd: Soft, NT, ND, no guarding or rebound. G tube to gravity bag with scant green fluid.   Msk: no gross deformity  Skin:  No jaundice  Ext: warm, well perfused  Neuro: grossly normal  Mental status/Psych: A&O. Asks/answers questions appropriately         LABS:  [facility]  Recent Labs   Lab 08/09/22  1704 08/09/22  1258 08/09/22  1156 08/09/22  0742 08/09/22  0548 08/09/22  0117 08/08/22  2238 08/08/22  1759 08/08/22  1630 08/08/22  0916 08/08/22  0740 08/07/22  1121 08/07/22  0842 08/06/22  0812 08/06/22  0646   NA  --   --   --   --  136  --   --   --   --   --  142  --  140  --  140   POTASSIUM  --  5.1  --   --  5.5*  5.5*  --  5.7*  --  6.1*  --  5.5*  --  4.7  --  4.3   CHLORIDE  --   --   --   --  95*  --   --   --   --   --  99  --  96*  --  95*   ESTUARDO  --   --   --   --  8.9  --   --   --   --   --  8.9  --  9.1  --  9.0   CO2  --   --   --   --  39*  --   --   --   --   --  41*  " --  41*  --  41*   BUN  --   --   --   --  55.6*  --   --   --   --   --  63.9*  --  56.9*  --  56.1*   CR  --   --   --   --  1.41*  --   --   --   --   --  1.23*  --  1.13*  --  1.21*   *  --  187* 213* 219*   < >  --    < >  --    < > 116*   < > 160*   < > 143*    < > = values in this interval not displayed.     CBC  Recent Labs   Lab 08/09/22  0548 08/08/22  1630 08/08/22  0740 08/07/22  0842 08/06/22  0646   WBC 6.3  --  4.6 8.4 8.2   RBC 2.17*  --  2.08* 2.56* 2.60*   HGB 6.5*   < > 6.5* 7.7* 7.8*   HCT 21.7*  --  21.0* 26.2* 26.3*     --  101* 102* 101*   MCH 30.0  --  31.3 30.1 30.0   MCHC 30.0*  --  31.0* 29.4* 29.7*   RDW 17.8*  --  18.6* 18.3* 18.3*     --  242 255 223    < > = values in this interval not displayed.     INR  Recent Labs   Lab 08/09/22  0548 08/08/22  1630 08/03/22  0633   INR 0.95 1.07 0.87     LFTs  Recent Labs   Lab 08/09/22  0548 08/08/22  0740 08/04/22  0902   ALKPHOS 644* 861* 60   * 1,143* 22   * 791* 12   BILITOTAL 0.3 0.9 0.2   PROTTOTAL 4.8* 5.0* 5.4*   ALBUMIN 2.5* 2.5* 2.8*      PANC  Recent Labs   Lab 08/08/22  0740   LIPASE 18   AMYLASE 18*

## 2022-08-10 NOTE — ANESTHESIA PREPROCEDURE EVALUATION
Anesthesia Pre-Procedure Evaluation    Patient: Sofie Rodriguez   MRN: [id number] : 1960        Procedure : Procedure(s):  ENDOSCOPIC RETROGRADE CHOLANGIOPANCREATOGRAPHY  ESOPHAGOGASTRODUODENOSCOPY (EGD)          Sofie Rodriguez is a 62 year old female with a PMH significant for end-stage COPD, HTN, HFpEF, Mycobacterium peregrinum colonization, h/o hepatitis C, HECTOR s/p LEEP procedure, osteopenia, and former methamphetamine use.  Pt. is now s/p BSLT on 22, lungs slightly undersized.   Persistent low dose pressor needs post-op through 7/10.  Extubated POD #2 but with persistent hypercapnia, mostly compensated and only slightly improved with intermittent BiPAP, discontinued 8/3.  Also with bilateral pleural effusions, left radial artery thrombus (presumed secondary to arterial line) s/p thrombectomy , BACILIO, C diff, gastroparesis s/p GJ tube placement in IR , and coffee ground G tube output s/p EGD 8/3 with pyloric ulcer noted.  Hemoglobin drop with dark tarry stools .  Also with acute rise in LFTs , abdominal US with extrahepatic mass and MRCP with increase in biliary dilation.    Past Medical History:   Diagnosis Date     CHF (congestive heart failure) (H)      COPD (chronic obstructive pulmonary disease) (H)      Hepatitis 2017    Hep C, [facility]     HTN (hypertension)      Osteopenia       Past Surgical History:   Procedure Laterality Date     BRONCHOSCOPY (RIGID OR FLEXIBLE), DIAGNOSTIC N/A 2022    Procedure: BRONCHOSCOPY, DIAGNOSTIC- inspection Bronch;  Surgeon: Kamala Lovell MD;  Location:  GI     BRONCHOSCOPY FLEXIBLE AND RIGID N/A 2022    Procedure: BRONCHOSCOPY inspection only;  Surgeon: Bob Liao MD;  Location:  GI     COLONOSCOPY       CV CORONARY ANGIOGRAM N/A 2021    Procedure: CV CORONARY ANGIOGRAM;  Surgeon: Alexander Cuellar MD;  Location:  HEART CARDIAC CATH LAB     CV RIGHT HEART CATH MEASUREMENTS RECORDED N/A 2021    Procedure: CV  RIGHT HEART CATH;  Surgeon: Alexander Cuellar MD;  Location:  HEART CARDIAC CATH LAB     ENT SURGERY  1974    tonsillectomy     ESOPHAGOGASTRODUODENOSCOPY, WITH NASOGASTRIC TUBE INSERTION N/A 2022    Procedure: ESOPHAGOGASTRODUODENOSCOPY, WITH NASOJEJUNAL TUBE INSERTION;  Surgeon: Ozzy Nickerson MD;  Location:  GI     ESOPHAGOSCOPY, GASTROSCOPY, DUODENOSCOPY (EGD), COMBINED N/A 8/3/2022    Procedure: ESOPHAGOGASTRODUODENOSCOPY (EGD);  Surgeon: Ira Andres MD;  Location:  GI     HAND SURGERY       LEEP TX, CERVICAL  2017    HECTOR III     LYMPH NODE BIOPSY Left     Left axilla, benign- [place]     MIDLINE INSERTION - DOUBLE LUMEN Left 2022    20cm, Basilic vein     THROMBECTOMY UPPER EXTREMITY Left 2022    Procedure: LEFT RADIAL ARM THROMBECTOMY;  Surgeon: Christie Graham MD;  Location:  OR     TRANSPLANT LUNG RECIPIENT SINGLE X2 Bilateral 2022    Procedure: Clamshell Incision, Bilateral Sequential Lung Transplant, On Cardiopulmonary Bypass, Flexible Bronchoscopy;  Surgeon: Sue Sunshine MD;  Location:  OR      Allergies   Allergen Reactions     Blood Transfusion Related (Informational Only) Other (See Comments)     Patient has a history of a clinically significant antibody against RBC antigens.  A delay in compatible RBCs may occur.       Social History     Tobacco Use     Smoking status: Former Smoker     Years: 30.00     Types: Cigarettes     Quit date: 2020     Years since quittin.7     Smokeless tobacco: Never Used   Substance Use Topics     Alcohol use: Not Currently      Wt Readings from Last 1 Encounters:   22 67.9 kg (149 lb 9.6 oz)        Anesthesia Evaluation   Pt has had prior anesthetic. Type: General.    No history of anesthetic complications       ROS/MED HX  ENT/Pulmonary: Comment: End Stage COPD, Bronchiectasis, Hyperinflation s/p bilateral lung transplant on 22    (+) tobacco use, Past use,  severe,  COPD, O2 dependent,     Neurologic:       Cardiovascular:     (+) hypertension-----dysrhythmias, a-flutter and PVCs, Previous cardiac testing   Echo: Date: 21 Results:  [id number]  [id number]  [id number]  [id number]^SACHIN^DARCY^PARTH     [facility],[place]  Echocardiography Laboratory  [address] [place] [address]     Name: LAMAS CASIANO  MRN: [id number]  : 1960  Study Date: 2021 08:46 AM  Age: 61 yrs  Gender: Female  Patient Location: [facility]  Reason For Study: COPD (chronic obstructive pulmonary disease) (H), Encounter  for  Ordering Physician: DARCY STEPHENSON  Referring Physician: DARCY STEPHENSON  Performed By: Kateryna Concepcion RDCS     BSA: 1.6 m2  Height: 63 in  Weight: 135 lb  HR: 104  BP: 127/86 mmHg  ______________________________________________________________________________  Procedure  Bubble Echocardiogram with two-dimensional, color and spectral Doppler  performed.  ______________________________________________________________________________  Interpretation Summary  Global and regional left ventricular function is normal with an EF of 55-60%.  Mild concentric wall thickening consistent with left ventricular hypertrophy  is present.  Global right ventricular function is normal.  No significant valvular abnormalities.  Pulmonary artery systolic pressure cannot be assessed.  Negative bubble study.  There is no prior study for direct comparison.  ______________________________________________________________________________  Left Ventricle  Global and regional left ventricular function is normal with an EF of 55-60%.  Left ventricular size is normal. Mild concentric wall thickening consistent  with left ventricular hypertrophy is present. Diastolic function not assessed  due to tachycardia. No regional wall motion abnormalities are seen. Abnormal  non-specific septal motion is present.     Right Ventricle  The right ventricle is normal size.  Global right ventricular function is  normal. Right ventricular wall thickness is normal.     Atria  Both atria appear normal. The atrial septum is intact as assessed by color  Doppler and agitated saline bubble study .     Mitral Valve  The mitral valve is normal.     Aortic Valve  Aortic valve is normal in structure and function. The aortic valve is  tricuspid.     Tricuspid Valve  The tricuspid valve is normal. The peak velocity of the tricuspid regurgitant  jet is not obtainable. Trace tricuspid insufficiency is present. Pulmonary  artery systolic pressure cannot be assessed.     Pulmonic Valve  The pulmonic valve is normal.     Vessels  The aorta root is normal. The pulmonary artery cannot be assessed. IVC  diameter <2.1 cm collapsing >50% with sniff suggests a normal RA pressure of 3  mmHg.     Pericardium  No pericardial effusion is present.     Compared to Previous Study  There is no prior study for direct comparison.     Attestation  I have personally viewed the imaging and agree with the interpretation and  report as documented by the fellow, Greg Vargas, and/or edited by me.  ______________________________________________________________________________  MMode/2D Measurements & Calculations  IVSd: 1.2 cm  LVIDd: 4.1 cm  LVIDs: 3.0 cm  LVPWd: 1.1 cm  FS: 28.5 %  LV mass(C)d: 161.4 grams  LV mass(C)dI: 98.7 grams/m2  Ao root diam: 3.5 cm  asc Aorta Diam: 2.4 cm  LVOT diam: 2.0 cm  LVOT area: 3.1 cm2  LA Volume (BP): 36.2 ml     LA Volume Index (BP): 22.1 ml/m2  RWT: 0.51     Doppler Measurements & Calculations  MV E max evan: 87.8 cm/sec  MV dec slope: 693.0 cm/sec2  MV dec time: 0.13 sec  PA acc time: 0.07 sec  E/E' avg: 15.4  Lateral E/e': 14.7  Medial E/e': 16.1     Stress Test: Date: Results:    ECG Reviewed: Date: 8/8/22 Results:  NSR  Cath: Date: Results:  RA 10/9/9  RV 46/10  PA 46/27 (35)  [id number]  NIBP 151/91(105)  Pa sat 72.2%   Ao sat 99%  Tianna CO/Ci 3.6/2.2  TD CO/CI 5.8/3.6  PVR (per tianna)  5.5WU, per thermo 3.4WU Right sided filling pressures are normal. Left sided filling pressures are mildly elevated. Moderately elevated pulmonary artery hypertension. Normal cardiac output level.    METS/Exercise Tolerance: 1 - Eating, dressing    Hematologic:     (+) History of blood clots, pt is not anticoagulated,     Musculoskeletal:       GI/Hepatic:     (+) hepatitis type C, liver disease,     Renal/Genitourinary:     (+) renal disease, type: ARF, Pt does not require dialysis,     Endo: Comment: Steroid induced hyperglycemia on insulin    (+) Chronic steroid usage for Post Transplant Immunosuppression. Obesity,     Psychiatric/Substance Use:     (+) alcohol abuse Recreational drug usage: Meth and Cannabis (Stopped in 2019).    Infectious Disease:       Malignancy:       Other:            Physical Exam    Airway        Mallampati: II   TM distance: > 3 FB   Neck ROM: full     Respiratory Devices and Support         Dental       (+) missing      Cardiovascular   cardiovascular exam normal          Pulmonary   pulmonary exam normal                OUTSIDE LABS:  CBC:   Lab Results   Component Value Date    WBC 8.3 08/10/2022    WBC 6.3 08/09/2022    HGB 8.4 (L) 08/10/2022    HGB 8.5 (L) 08/09/2022    HCT 27.2 (L) 08/10/2022    HCT 21.7 (L) 08/09/2022     08/10/2022     08/09/2022     BMP:   Lab Results   Component Value Date     08/10/2022     08/09/2022    POTASSIUM 4.8 08/10/2022    POTASSIUM 4.8 08/10/2022    CHLORIDE 97 (L) 08/10/2022    CHLORIDE 95 (L) 08/09/2022    CO2 38 (H) 08/10/2022    CO2 39 (H) 08/09/2022    BUN 46.0 (H) 08/10/2022    BUN 55.6 (H) 08/09/2022    CR 1.36 (H) 08/10/2022    CR 1.41 (H) 08/09/2022     (H) 08/10/2022     (H) 08/10/2022     COAGS:   Lab Results   Component Value Date    PTT 27 08/03/2022    INR 0.95 08/09/2022    FIBR 192 06/29/2022     POC: No results found for: BGM, HCG, HCGS  HEPATIC:   Lab Results   Component Value Date    ALBUMIN  2.5 (L) 08/09/2022    PROTTOTAL 4.8 (L) 08/09/2022     (HH) 08/09/2022     (H) 08/09/2022    ALKPHOS 644 (H) 08/09/2022    BILITOTAL 0.3 08/09/2022    LAYLA 23 08/08/2022     OTHER:   Lab Results   Component Value Date    PH 7.38 07/13/2022    LACT 0.5 (L) 08/10/2022    A1C 5.8 (H) 06/28/2022    ESTUARDO 9.0 08/10/2022    PHOS 4.1 08/10/2022    MAG 2.7 (H) 08/10/2022    LIPASE 18 08/08/2022    AMYLASE 18 (L) 08/08/2022    TSH 1.49 07/18/2022       Anesthesia Plan    ASA Status:  4      Anesthesia Type: General.     - Airway: ETT   Induction: Intravenous.   Maintenance: Balanced.   Techniques and Equipment:     - Airway: Video-Laryngoscope     - Lines/Monitors: 2nd IV, BIS     Consents    Anesthesia Plan(s) and associated risks, benefits, and realistic alternatives discussed. Questions answered and patient/representative(s) expressed understanding.    - Discussed:     - Discussed with:  Patient      - Extended Intubation/Ventilatory Support Discussed: Yes.      - Patient is DNR/DNI Status: No    Use of blood products discussed: No .     Postoperative Care    Pain management: IV analgesics.   PONV prophylaxis: Ondansetron (or other 5HT-3)     Comments:           H&P reviewed: Unable to attach H&P to encounter due to EHR limitations. H&P Update: appropriate H&P reviewed, patient examined, interval changes since H&P (within 30 days) include: s/p BLT; L donna byrne;          Sukhdeep Swanson MD

## 2022-08-10 NOTE — PROGRESS NOTES
Pulmonary Medicine  Cystic Fibrosis - Lung Transplant Team  Progress Note  08/10/2022       Patient: Sofie Rodriguez  MRN: [id number]  : 1960 (age 62 year old)  Transplant: 2022 (Lung), POD#43  Admission date: 2022    Assessment & Plan:     Sofie Rodriguez is a 62 year old female with a PMH significant for end-stage COPD, HTN, HFpEF, Mycobacterium peregrinum colonization, h/o hepatitis C, HECTOR s/p LEEP procedure, osteopenia, and former methamphetamine use.  Pt. is now s/p BSLT on 22, lungs slightly undersized.   Persistent low dose pressor needs post-op through 7/10.  Extubated POD #2 but with persistent hypercapnia, mostly compensated and only slightly improved with intermittent BiPAP, discontinued 8/3.  Also with bilateral pleural effusions, left radial artery thrombus (presumed secondary to arterial line) s/p thrombectomy , BACILIO, C diff, gastroparesis s/p GJ tube placement in IR , and coffee ground G tube output s/p EGD 8/3 with pyloric ulcer noted.  Hemoglobin drop with dark tarry stools .  Also with acute rise in LFTs , abdominal US with extrahepatic mass and MRCP with increase in biliary dilation.      Today's recommendations:  - DSA pending  - Repeat CXR tomorrow (ordered)   - Wean supplemental oxygen as able, discourage use if >92% SpO2, exercise and overnight oximetry needed prior to discharge  - BLE US prior to discharge (screening for thrombus)  - Tacrolimus level tomorrow to trend, steady state ordered   - Prednisone taper due  (not yet ordered)  - Hepatitis C RNA and EBV () pending  - EGD and ERCP tomorrow with GI     S/p bilateral sequential lung transplant (BSLT) for end stage COPD:  Persistent hypercapneic respiratory failure:   Persistent hypotension, Resolved:   Bilateral hydroPTX:  Right hemidiaphragm palsy: Explant pathology with severe emphysema with subpleural bullae formation, changes of chronic bronchitis, subpleural scars and patchy pulmonary  edema; benign hilar lymph nodes.  Extubated 6/30.  Persistent hypercapnia without dyspnea, appears to be a respiratory drive problem.  Sniff (7/14) notable for right hemidiaphragm palsy.  Bronch (7/19) with slight graying of mucosa noted at right anastomosis and scant secretions (slightly increased in RLL).  Chest CT (7/23) with increased loculated fluid within the left hemithorax with specks of air density in the loculated fluid, similar appearing loculated right hydroPTX with minimal decrease in fluid component, increased size of pleural based lesion in MARLON, and mild subcutaneous emphysema along right chest tube with minimal along tract of removed left chest tube.  S/p left apical chest tube 7/25-8/4, right surgical tube removed 8/6.  Bronch (8/2) with normal inspection of anastomoses.  NIPPV initially overnight for persistent hypercapnia, stopped 8/3 given lack of improvement with therapy, compensated hypercapnia persists.    - DSA pending (8/10)  - Nebs: levalbuterol and Mucomyst BID   - Aerobika and incentive spirometry hourly while awake  - CXR ordered tomorrow (monitoring every other day)  - Wean supplemental oxygen as able, discourage use if >92% SpO2, exercise and overnight oximetry needed prior to discharge  - Will need BLE US prior to discharge (screening for thrombus)     Immunosuppression:  Induction therapy with basiliximab (and high dose IV steroid).  - Tacrolimus 5 mg BID (incresaed 8/9, via J tube).  Goal level 8-12.  Repeat level 8/11 to trend and 8/12 for steady state (ordered).  -  mg BID (increased 8/7, prior decrease for GI symptoms/leukopenia)   - Prednisone 12.5 mg qAM / 10 mg qPM, next taper due 8/18 (not yet ordered)  Date AM dose (mg) PM dose (mg)   8/4/22 12.5 10   8/18/22 10 10   9/15/22 10 7.5   10/13/22 7.5 7.5   11/10/22 7.5 5   12/8/22 5 5   1/5/23 5 2.5      Prophylaxis:   - Dapsone qMWF for PJP ppx (7/18)  - VGCV for CMV ppx, CMV negative 8/8  - Nystatin for oral candidiasis  ppx, 6 month course  - See below for serologies and viral ppx:    Donor Recipient Intervention   CMV status Positive Positive Valganciclovir POD #8-90   EBV status Positive Positive EBV monitoring as below   HSV status N/A Positive Not indicated      ID: Donor bronch cultures (OSH) with Strep beta hemolytic (not group A).     H/o M. peregrinum colonization: NGTD post-transplant.  - AFB to be sent on all future bronchs     Streptococcus pneumoniae:  Stenotrophomonas maltophilia: Noted in recipient cultures at time of transplant.  S/p ceftazidime 6/28-7/10, vancomycin 7/7-7/8 and 6/28-6/30, and levofloxacin 7/10-7/12 for total 2 week ABX course.     Hypogammaglobulinemia: IgG adequate at time of transplant, repeat level low (336) on 7/28.  S/p IVIG dosing with premedication 7/30, tolerated well.  IgG on 8/10 low but stable at 590, replacement not indicated.     H/o hepatitis C:  Diagnosed in 1980s s/p 2m treatment, quant negative since 10/2017, last positive 2/20/17 ([phone]).  Ab positive on 6/2021 with negative HCV PCR.  H/o remote EtOH abuse.  MR elastography (4/27/21) with hepatology review and consult without any concerns post transplant.  Hepatitis C RNA negative and hepatitis C antibody positive (old) on 6/28.  - Hepatitis C RNA (8/8) pending in setting of elevated LFTs as below     EBV viremia: EBV level 11k, log 4.1 on 7/28.  Not likely to be clinically significant.  Repeat EBV (in the setting of elevated LFTs) remains stable (8/8).     Other relevant problems managed by primary team:      SVT:   Aflutter with RVR: SVT first noted on 7/14, prior to HD line placement.  Continues intermittently.  Aflutter with RVR to 200 7/17 triggered by activity.  EP consulted 7/17 given persistent tachycardia/dysrhythmia.  Midodrine held 7/19 and since discontinued.  AC and metoprolol per primary team.     Left hand ischemia: Radial artery thrombosis identified on duplex doppler.  S/p thrombectomy on 7/2.  AC per  primary team as above.      BACILIO:   Hyperkalemia: Likely multifactorial including medications (Bactrim, tacrolimus) and hypotension.  Fludrocortisone 7/6-7/11.  Potassium had been stable, now elevated to 5.5-5.7 since 8/8.  S/p non-tunneled HD line 7/14.  Initial iHD run 7/14 limited by afib with RVR vs SVT.  Last iHD run 7/16, line removed 7/22.  Creatinine increased since 7/29 with Diamox and elevated tacrolimus level.  Transitioned to low potassium TF formula 8/8 although currently held.  - Tacrolimus monitoring as above  - Florinef (8/9)     Elevated LFTs:   Extrahepatic mass: Acute rise in LFTs (alk phos 861, , AST 1,143 and bilirubin 0.9) on 8/8 (prior normal).  Also in setting of increased and different abdominal pain as below.  Amylase low, lipase normal.  Abdominal US 8/8 with extrahepatic mass at level of common bile duct with associated intrahepatic and common bile duct dilation, patent hepatic vasculature, and layering gallbladder sludge.  Concern for infection vs malignancy vs fluid collection.  MRCP (8/9) with slight increase in moderate biliary dilation and gall bladder with moderate protein/hemorrhagic material.  Also noted to have cystic foci in the pancreatic head (most consistent with IPMN).  - LFTs daily  - ERCP tomorrow (with EGD as below)  - Will need repeat abdominal CT in 6 months for pancreatic findings     Severe gastroparesis:   Pyloric ulcer: Cramping abdominal pain 7/15.  AXR with large stool burden in colon, no obstruction or distention.  Some nausea but no emesis.  CT abdomen (7/15) with moderate to large gastric distention, otherwise without obstruction.  NG placed for LIS with moderate to large output for several days.  Increased abdominal pain 7/17 after clamping for 4 hours.  Gastric emptying study (7/20) with severe gastric emptying delay (95% retention at 4 hours).  S/p GJ tube placement in IR 7/27.  S/p EGD 8/3 for coffee ground G tube output, noted to have pyloric  ulcer and excessive gastric fluid and residual food.  Increased abdominal pain/cramping with trial of cycled TF 8/7 to 8/8.  AXR 8/8 without evidence of peritoneal free air or abnormally dilated loops of bowel.  Hemoglobin drop with dark tarry stools 8/8.  - PPI BID  - Simethicone prn  - TF via J tube, management per RD and primary team  - G tube to gravity drainage   - Strict NPO except for ice chips and sips  - EGD tomorrow with GI     C diff infection: Abdominal pain with diarrhea noted 7/8, AXR without dilated bowel, moderate colonic stool burden.  C diff positive 7/11.  Loose stools (on tube feeds) stable.  S/p PO vancomycin (7/11-7/28).  Repeat C diff negative 8/6.  - Low threshold to resume vancomycin in the future with ABX course     Hypomagnesemia: Suppressed absorption d/t CNI.   - Continue daily magnesium with replacement protocol prn, schedule oral magnesium supplementation if needed prior to discharge     We appreciate the excellent care provided by the [place] [id number] team.  Recommendations communicated via in person rounding and this note.  Will continue to follow along closely, please do not hesitate to call with any questions or concerns.     Patient discussed with Dr. Castillo.    Catherine Johnson, DNP, APRN, CNP  Inpatient Nurse Practitioner  Pulmonary CF/Transplant     Subjective & Interval History:     On 1L NC for comfort though wihtout documented hypoxia.  Minimal cough and sputum, good activity tolerance.  Loose stools stable, no nausea, minimal abdominal pain.  Remains NPO with TF on hold pending workup.  Received 1u PRBC yesterday for low hgb.      Review of Systems:     C: No fever, no chills, no recent weight  INTEGUMENTARY/SKIN: No rash or obvious new lesions  ENT/MOUTH: No sore throat, no sinus pain, no nasal congestion or drainage  RESP: See interval history  CV: No chest pain, no palpitations, no peripheral edema, no orthopnea  GI: No nausea, no vomiting, + stable loose stools, no reflux  "symptoms  : No dysuria  MUSCULOSKELETAL: No myalgias, no arthralgias  ENDOCRINE: Blood sugars intermittently elevated  NEURO: No headache, no numbness or tingling  PSYCHIATRIC: Mood stable    Physical Exam:     All notes, images, and labs from past 24 hours (at minimum) were reviewed.    Vital signs:  Temp: 98.1  F (36.7  C) Temp src: Oral BP: 133/76 Pulse: 104   Resp: 22 SpO2: 100 % O2 Device: Nasal cannula Oxygen Delivery: 1 LPM Height: 157.5 cm (5' 2\") Weight: 67.9 kg (149 lb 9.6 oz)  I/O:     Intake/Output Summary (Last 24 hours) at 8/10/2022 1417  Last data filed at 8/10/2022 1137  Gross per 24 hour   Intake 510 ml   Output 1950 ml   Net -1440 ml     Constitutional: Sitting up in a chair, in no apparent distress.   HEENT: Eyes with pink conjunctivae, anicteric.  Oral mucosa moist without lesions.   PULM: Mildly diminished bases bilaterally.  Faint bibasilar crackles, no rhonchi, no wheezes.  Non-labored breathing on RA.  CV: Normal S1 and S2.  RRR.  No murmur, gallop, or rub.  No peripheral edema.   ABD: NABS, soft, + mildly tender on right, nondistended.  PEG/J tube site not viusalized.  MSK: Moves all extremities.  No apparent muscle wasting.   NEURO: Alert and oriented, conversant.   SKIN: Warm, dry.  No rash on limited exam.   PSYCH: Mood stable.     Lines, Drains, and Devices:  Midline Catheter Double Lumen (Active)   Site Assessment WDL 08/10/22 0800   External Cath Length (cm) 2 cm 08/04/22 1244   Initial Extremity Circumference (cm) 29 cm 07/28/22 1455   Dressing Intervention Chlorhexidine patch 08/10/22 0800   Line Necessity Yes, meets criteria 08/10/22 0800   Dressing Change Due 08/11/22 08/10/22 0400   Purple - Status saline locked 08/10/22 0800   Purple - Cap Change Due 08/10/22 08/10/22 0400   Red - Status infusing 08/10/22 0800   Red - Cap Change Due 08/10/22 08/10/22 0400   Extravasation? No 08/09/22 0000   Number of days: 13     Data:     LABS    CMP:   Recent Labs   Lab 08/10/22  1306 " 08/10/22  1145 08/10/22  1045 08/10/22  0948 08/10/22  0943 08/10/22  0650 08/10/22  0453 08/09/22  1704 08/09/22  1258 08/09/22  0742 08/09/22  0548 08/08/22  0916 08/08/22  0740 08/07/22  1121 08/07/22  0842 08/04/22  1202 08/04/22  0902   NA  --   --   --   --   --   --  138  --   --   --  136  --  142  --  140   < > 140   POTASSIUM  --   --   --  4.8  4.8  --   --  5.7*  --  5.1  --  5.5*  5.5*   < > 5.5*  --  4.7   < > 4.9   CHLORIDE  --   --   --   --   --   --  97*  --   --   --  95*  --  99  --  96*   < > 96*   CO2  --   --   --   --   --   --  38*  --   --   --  39*  --  41*  --  41*   < > 41*   ANIONGAP  --   --   --   --   --   --  3*  --   --   --  2*  --  2*  --  3*   < > 3*   * 196* 169*  --  159*   < > 111*   < >  --    < > 219*   < > 116*   < > 160*   < > 122*   BUN  --   --   --   --   --   --  46.0*  --   --   --  55.6*  --  63.9*  --  56.9*   < > 60.3*   CR  --   --   --   --   --   --  1.36*  --   --   --  1.41*  --  1.23*  --  1.13*   < > 1.20*   GFRESTIMATED  --   --   --   --   --   --  44*  --   --   --  42*  --  50*  --  55*   < > 52*   ESTUARDO  --   --   --   --   --   --  9.0  --   --   --  8.9  --  8.9  --  9.1   < > 9.4   MAG  --   --   --   --   --   --  2.7*  --   --   --  2.4*  --  2.5*  --  2.5*   < > 2.4*   PHOS  --   --   --   --   --   --  4.1  --   --   --  3.8  --  3.2  --  3.0   < > 3.1   PROTTOTAL  --   --   --   --   --   --   --   --   --   --  4.8*  --  5.0*  --   --   --  5.4*   ALBUMIN  --   --   --   --   --   --   --   --   --   --  2.5*  --  2.5*  --   --   --  2.8*   BILITOTAL  --   --   --   --   --   --   --   --   --   --  0.3  --  0.9  --   --   --  0.2   ALKPHOS  --   --   --   --   --   --   --   --   --   --  644*  --  861*  --   --   --  60   AST  --   --   --   --   --   --   --   --   --   --  235*  --  1,143*  --   --   --  22   ALT  --   --   --   --   --   --   --   --   --   --  601*  --  791*  --   --   --  12    < > = values in this interval not  displayed.     CBC:   Recent Labs   Lab 08/10/22  0453 08/09/22  2213 08/09/22  0548 08/08/22  1630 08/08/22  0740 08/07/22  0842   WBC 8.3  --  6.3  --  4.6 8.4   RBC 2.78*  --  2.17*  --  2.08* 2.56*   HGB 8.4* 8.5* 6.5* 7.3* 6.5* 7.7*   HCT 27.2*  --  21.7*  --  21.0* 26.2*   MCV 98  --  100  --  101* 102*   MCH 30.2  --  30.0  --  31.3 30.1   MCHC 30.9*  --  30.0*  --  31.0* 29.4*   RDW 17.4*  --  17.8*  --  18.6* 18.3*     --  239  --  242 255       INR:   Recent Labs   Lab 08/09/22  0548 08/08/22  1630   INR 0.95 1.07       Glucose:   Recent Labs   Lab 08/10/22  1306 08/10/22  1145 08/10/22  1045 08/10/22  0943 08/10/22  0840 08/10/22  0814   * 196* 169* 159* 151* 182*       Blood Gas:   Recent Labs   Lab 08/04/22  0902   PHV 7.42   PCO2V 71*   PO2V 31   HCO3V 46*   LINDY 18.7*   O2PER 2       Culture Data No results for input(s): CULT in the last 168 hours.    Virology Data:   Lab Results   Component Value Date    FLUAH1 Not Detected 06/29/2022    FLUAH3 Not Detected 06/29/2022    LD9138 Not Detected 06/29/2022    IFLUB Not Detected 06/29/2022    RSVA Not Detected 06/29/2022    RSVB Not Detected 06/29/2022    PIV1 Not Detected 06/29/2022    PIV2 Not Detected 06/29/2022    PIV3 Not Detected 06/29/2022    HMPV Not Detected 06/29/2022       Historical CMV results (last 3 of prior testing):  Lab Results   Component Value Date    CMVQNT Not Detected 08/08/2022    CMVQNT Not Detected 07/25/2022     No results found for: CMVLOG    Urine Studies    Recent Labs   Lab Test 08/01/22  0319 07/08/22  0831 07/05/22  1004   URINEPH 8.0* 5.5 5.5   NITRITE Negative Negative Negative   LEUKEST Negative Negative Negative   WBCU  --  1 5       Most Recent [place] Pulmonary Function Testing (FVC/FEV1 only)  FVC-Pre   Date Value Ref Range Status   04/29/2022 1.82 L    11/11/2021 2.17 L    06/14/2021 2.00 L      FVC-%Pred-Pre   Date Value Ref Range Status   04/29/2022 58 %    11/11/2021 70 %    06/14/2021 64 %       FEV1-Pre   Date Value Ref Range Status   04/29/2022 0.51 L    11/11/2021 0.53 L    06/14/2021 0.54 L      FEV1-%Pred-Pre   Date Value Ref Range Status   04/29/2022 20 %    11/11/2021 21 %    06/14/2021 21 %        IMAGING    Recent Results (from the past 48 hour(s))   XR Abdomen 2 Views    Narrative    EXAMINATION:  XR ABDOMEN 2 VIEWS 8/8/2022 4:47 PM     COMPARISON: Gastrojejunostomy tube placement images 7/27/2022,  radiographs 7/16/2022, CT 7/15/2022.    HISTORY: Abd pain worse when feeds increased-- Want to rule out ulcer  perforation vs ileus    TECHNIQUE: Frontal upright and supine views of the abdomen.    FINDINGS: Gastrojejunostomy bulb projects over the stomach, tube tip  projects over the jejunum. No evidence of peritoneal free air. No  abnormally dilated loops of bowel. No pneumatosis or portal venous  gas.    Changes of bilateral lung transplant. Bilateral loculated pleural  effusions and atelectasis.      Impression    IMPRESSION: No radiographic evidence of peritoneal free air. No  abnormally dilated loops of bowel.    I have personally reviewed the examination and initial interpretation  and I agree with the findings.    GABRIELLA SNELL MD         SYSTEM ID:  [id number]   US Abdomen Limited w Abd/Pelv Duplex Complete Port   Result Value    Radiologist flags (Urgent)     New common bile duct dilatation with potential mass    Narrative    EXAMINATION: US ABDOMEN LIMITED WITH DOPPLER COMPLETE PORTABLE  8/8/2022 5:56 PM     COMPARISON: CT chest 7/28/2022    HISTORY: New abdominal pain in setting of change in tube feeds but  with rising LFTs concerning for cholangitis versus hepatitis.    TECHNIQUE: The abdomen was scanned in standard fashion with  specialized ultrasound transducer(s) using both gray-scale, color  Doppler, and spectral flow techniques.    Findings:  Liver: The liver demonstrates normal homogeneous echotexture measuring  12.5 cm in length. No evidence of a focal hepatic mass.      Extrahepatic portal vein flow is antegrade at 24 cm/s.  Right portal vein flow is antegrade, measuring 26 cm/s.  Left portal vein flow is antegrade, measuring 33 cm/s.    Flow in the hepatic artery is towards the liver and:  94 cm/s peak systolic  0.65 resistive index.     The splenic vein is patent and flow is towards the liver.  The left,  middle, and right hepatic veins are patent with flow towards the IVC.  The IVC is patent with flow towards the heart.   The visualized aorta  is not dilated.    Gallbladder: Layering sludge in the gallbladder with borderline wall  thickening measuring up to 4 mm. No pericholecystic fluid, positive  sonographic Sánchez's sign or evidence for cholelithiasis    Bile Ducts: Intrahepatic biliary dilation with an echogenic  extrahepatic mass at the level of the common bile duct measuring 3.7 x  2.8 x 1.8 cm. The common bile duct measures 11 mm.    Pancreas: Visualized portions of the head and body of the pancreas are  unremarkable.     Kidneys: Both kidneys are of normal echotexture, without mass or  hydronephrosis.   Renal lengths: right- 8.1 cm.    Fluid: Small right pleural effusion.      Impression    Impression:   1.  Extrahepatic mass at the level of the common bile duct with  associated intrahepatic and common bile duct dilation measuring 3.7 x  2.8 x 1.8 cm. Differential includes malignancy including  cholangiocarcinoma. Prior PET/CT from July of last year did not show  any lesions in this area. It did however show a duodenal diverticulum.  Recommend follow-up abdominal liver MRI with MRCP or pancreatic mass  CT for further evaluation.  2.  Patent hepatic vasculature without sonographic evidence to suggest  hemodynamically significant stenosis.  3.  Layering gallbladder sludge without additional evidence to suggest  acute cholecystitis.  4.  Small right pleural effusion.    [Access Center: New common bile duct dilatation with potential mass  near the pancreas. MR of the  abdomen with MRCP versus CT with  pancreatic mass protocol recommended.]    This report will be copied to the [facility] to ensure a  provider acknowledges the finding. Access Center is available Monday  through Friday 8am-3:30 pm.     I have personally reviewed the examination and initial interpretation  and I agree with the findings.    GABRIELLA SNELL MD         SYSTEM ID:  [id number]   XR Chest 2 Views    Narrative    Chest 2 views    Indication interval follow-up, lung transplant comment bilateral chest  tubes    COMPARISON: Yesterday    FINDINGS: Clamshell sternotomy from prior bilateral lung transplant  again noted. Calcification of the aortic knob. Heart size normal.  Bilateral pleural effusions unchanged with possible loculated  component especially on the left. No ectopic air collections in the  interim. Perihilar streaky opacities unchanged.      Impression    IMPRESSION: Postoperative bilateral lung transplant again noted with  continued pleural effusions and perihilar streaky prominence which  could indicate atelectasis or edema. No acute interval changes.    ALVINA HARRY MD         SYSTEM ID:  [id number]   MR Abdomen MRCP w/o & w Contrast    Narrative    Exam: MR ABDOMEN MRCP W/O & W CONTRAST, 8/10/2022 7:59 AM    Indication: Extra-hepatic mass seen on US    Comparison: 8/8/2022, 7/15/2010    Technique: Images were acquired with and without intravenous  gadolinium contrast through the upper abdomen. The following MR images  were acquired without intravenous contrast: TrueFISP, multiplanar  T2-weighted, axial T1 in/out of phase, T2-weighted MRCP images, axial  diffusion-weighted and axial apparent diffusion coefficient.  T1-weighted images were obtained before contrast at the multiple time  points following contrast injection. 3-D reformatted images were  generated by the technologist. Contrast dose: 7.5mL Gadavist    FINDINGS:    Liver: Normal    Gallbladder/Bile Ducts: Slightly increased  mild intrahepatic and  moderate extrahepatic biliary dilation with common hepatic duct  measuring up to 21 mm (series 4, image 9) and the common bile duct  measuring up to 11 mm with level of transition at the gallbladder neck  which bulges medially into the duct. The gallbladder is hydropic and  contains moderate amount of T1 hyperintense material, which extends  into the bladder neck with folding of the gallbladder wall at the  transition between the neck and body. No filling defect within the  intra or extrahepatic biliary tree.    Spleen: Normal    Kidneys: Subcentimeter right renal cortical cysts.    Adrenal glands: Normal    Pancreas: No atrophy. Nondilated pancreatic duct.  Cystic foci in the pancreatic head measuring 4 x 3 mm superiorly and 4  x 3 mm inferiorly (series 15, image 48 and 50 respectively).    Bowel: Nondilated.  Percutaneous gastrojejunostomy tube with tip in the proximal jejunum  in the right mid abdomen.    Lymph nodes: No adenopathy    Blood vessels: Patent portal, splenic and superior mesenteric veins  without thrombus, Conventional hepatic arterial anatomy    Lung bases: Loculated bilateral pleural effusions. Basilar opacities.    Bones and soft tissues: No acute osseous abnormality    Mesentery and abdominal wall: No hernia    Ascites: Small volume      Impression    IMPRESSION:   1.  Slightly increased moderate extrahepatic and mild intrahepatic  biliary dilation with the common hepatic duct measuring up to 2.1 cm  and the common bile duct measuring up to 1.1 cm in diameter with  transition in the upper common bile duct at the level of the hydropic  gallbladder, which bulges medially. The gallbladder contains a  moderate amount of proteinaceous/hemorrhagic material, which extends  into the gallbladder neck, corresponding to the findings on comparison  ultrasound. Consider HIDA scan as clinically warranted to assess for  acute cholecystitis. There is no solid mass as clinically  queried.  2.  Loculated bilateral pleural effusions.  3.  Small volume ascites.  4.  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 Recommend follow-up as described below.    [facility] recommendations for asymptomatic pancreatic  cysts, modified from international consensus guidelines*   Size of largest cyst:   Less than 1 cm: Follow-up imaging in 6 months to 1 year, then lengthen  interval to 2-3 years if no change.  1-2 cm: Follow-up imaging at 6 months, then yearly for 2 years, then  lengthen interval if no change.   The optimal initial study or first follow-up exam is MRI/MRCP  performed with intravenous gadolinium contrast to allow full cyst  characterization. Once characterized, contrast is optional on  follow-up MRIs. If MRI is contraindicated, CT with contrast is  recommended.   GI consultation for possible endoscopic ultrasound is recommended for  cysts with the following features: Size > 2 cm, >20% growth on  followup, wall thickening or enhancement, mural nodule, duct > 5 mm,  or abrupt change in duct caliber with distal atrophy. GI or surgical  consultation is also recommended for symptomatic cysts.   *Reference: International Consensus Guidelines for Management of IPMN  and MCN of the pancreas. Pancreatology: 12:(2012); 183-197.    I have personally reviewed the examination and initial interpretation  and I agree with the findings.    CECI REGAN,          SYSTEM ID:  [id number]

## 2022-08-10 NOTE — PROGRESS NOTES
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 She was found to have delayed gastric emptying on GES and underwent percutaneous GJ tube placement by IR on 7/27/22. GI consulted for development of G tube output concerning for upper GI bleed.      Underwent upper endoscopy evaluation 8/3/22 which showed large amount of fluid and solid food in stomach limiting visualization of gastric mucosa. A clean based ulcer was found at the pyloric channel suspicious for the source of bleeding although no active bleeding. No concerns with inspection of G tube.     Patient doing well until 8/8/22 when she began to have melena and Hgb drop to 6.5 concerning for recurrent bleeding. Also found to be hyperkalemic which has delayed EGD procedure. It is possible that previously seen pyloric ulcer was bleeding and my have stopped with cessation of heparin drip. No further episodes of melena since 8/8 and Hgb stable for 24 hours.     Also with acute rise in transaminases with unclear etiology. No known hypotensive episodes. Abdominal US with dopplers negative for thrombus but did show extrahepatic mass with CBD and intrahepatic duct dilation. MCRP showed biliary sludge and common bile duct dilation with possible obstruction due to stone or sludge. Reviewed with panc/bili team who recommend ERCP for further evaluation. LFTs improving today and bilirubin remains normal. Given stable Hgb and no further episodes of melena, will plan for EGD and ERCP during same procedure on 8/11.        RECOMMENDATIONS:  - Plan for EGD and ERCP on 8/11 under general anesthesia   - Strict NPO   - okay to resume tube feed today and hold at midnight    - continue IV PPI  "BID  - holding anticoagulation given plan for ERCP procedure   - plan discussed with patient and her daughter, Charity, over the phone today.   - BMP in AM to check potassium prior to procedure; anesthesia will determine goal potassium for general anesthesia. Anticipate goal with be at least K<5.   - repeat Hgb and LFTs in AM         Outpatient GI follow up:   -  Repeat upper endoscopy in 8 weeks (GI will arrange) to check healing of ulcer. Pending clinical picture and endoscopic findings may need to consider pyloric dilation if stricture develops from current inflammation.      The patient was discussed and plan agreed upon with GI staff, Dr. Tracie Arroyo MD  Gastroenterology Fellow - PGY4  [facility]   Page Me    _______________________________________________________________      Subjective:   Nursing notes and 24hr events reviewed. No bowel movement since 8/8. Patient notes abdominal discomfort and cramping she had 2 nights ago with tube feeds has not returned however TF have been off since that time. No nausea or vomiting. Discussed MRCP findings and plan for ERCP tomorrow which she agrees to do.     Sofie asked that her daughter, Charity, be called for an updated on planned procedure tomorrow.     ROS:   4 pt ROS negative unless noted in subjective.     Objective:  Blood pressure 130/73, pulse 102, temperature 98.7  F (37.1  C), temperature source Oral, resp. rate 22, height 1.575 m (5' 2\"), weight 67.9 kg (149 lb 9.6 oz), SpO2 100 %, not currently breastfeeding.  Gen: sitting up in chair, appears comfortable. Pleasant and conversational   HEENT: NCAT. Conjunctiva clear. Sclera anicteric  Resp: nonlabored breathing   Abd: Soft, ND, no guarding or rebound. Mild tenderness diffusely but more notable in lower quadrants. G tube to gravity bag with trace green fluid   Msk: no gross deformity  Skin:  No jaundice  Ext: warm, well perfused  Neuro: grossly normal  Mental status/Psych: A&O. " Asks/answers questions appropriately         LABS:  BMP  Recent Labs   Lab 08/10/22  1145 08/10/22  1045 08/10/22  0948 08/10/22  0943 08/10/22  0840 08/10/22  0650 08/10/22  0453 08/09/22  1704 08/09/22  1258 08/09/22  0742 08/09/22  0548 08/08/22  0916 08/08/22  0740 08/07/22  1121 08/07/22  0842   NA  --   --   --   --   --   --  138  --   --   --  136  --  142  --  140   POTASSIUM  --   --  4.8  4.8  --   --   --  5.7*  --  5.1  --  5.5*  5.5*   < > 5.5*  --  4.7   CHLORIDE  --   --   --   --   --   --  97*  --   --   --  95*  --  99  --  96*   ESTUARDO  --   --   --   --   --   --  9.0  --   --   --  8.9  --  8.9  --  9.1   CO2  --   --   --   --   --   --  38*  --   --   --  39*  --  41*  --  41*   BUN  --   --   --   --   --   --  46.0*  --   --   --  55.6*  --  63.9*  --  56.9*   CR  --   --   --   --   --   --  1.36*  --   --   --  1.41*  --  1.23*  --  1.13*   * 169*  --  159* 151*   < > 111*   < >  --    < > 219*   < > 116*   < > 160*    < > = values in this interval not displayed.     CBC  Recent Labs   Lab 08/10/22  0453 08/09/22  2213 08/09/22  0548 08/08/22  1630 08/08/22  0740 08/07/22  0842   WBC 8.3  --  6.3  --  4.6 8.4   RBC 2.78*  --  2.17*  --  2.08* 2.56*   HGB 8.4*   < > 6.5*   < > 6.5* 7.7*   HCT 27.2*  --  21.7*  --  21.0* 26.2*   MCV 98  --  100  --  101* 102*   MCH 30.2  --  30.0  --  31.3 30.1   MCHC 30.9*  --  30.0*  --  31.0* 29.4*   RDW 17.4*  --  17.8*  --  18.6* 18.3*     --  239  --  242 255    < > = values in this interval not displayed.     INR  Recent Labs   Lab 08/09/22  0548 08/08/22  1630   INR 0.95 1.07     LFTs  Recent Labs   Lab 08/09/22  0548 08/08/22  0740 08/04/22  0902   ALKPHOS 644* 861* 60   * 1,143* 22   * 791* 12   BILITOTAL 0.3 0.9 0.2   PROTTOTAL 4.8* 5.0* 5.4*   ALBUMIN 2.5* 2.5* 2.8*      PANC  Recent Labs   Lab 08/08/22  0740   LIPASE 18   AMYLASE 18*

## 2022-08-11 ENCOUNTER — APPOINTMENT (OUTPATIENT)
Dept: GENERAL RADIOLOGY | Facility: CLINIC | Age: 62
DRG: 007 | End: 2022-08-11
Attending: INTERNAL MEDICINE
Payer: MEDICARE

## 2022-08-11 ENCOUNTER — APPOINTMENT (OUTPATIENT)
Dept: CT IMAGING | Facility: CLINIC | Age: 62
DRG: 007 | End: 2022-08-11
Attending: STUDENT IN AN ORGANIZED HEALTH CARE EDUCATION/TRAINING PROGRAM
Payer: MEDICARE

## 2022-08-11 ENCOUNTER — APPOINTMENT (OUTPATIENT)
Dept: PHYSICAL THERAPY | Facility: CLINIC | Age: 62
DRG: 007 | End: 2022-08-11
Attending: THORACIC SURGERY (CARDIOTHORACIC VASCULAR SURGERY)
Payer: MEDICARE

## 2022-08-11 ENCOUNTER — ANESTHESIA (OUTPATIENT)
Dept: SURGERY | Facility: CLINIC | Age: 62
DRG: 007 | End: 2022-08-11
Payer: MEDICARE

## 2022-08-11 ENCOUNTER — APPOINTMENT (OUTPATIENT)
Dept: GENERAL RADIOLOGY | Facility: CLINIC | Age: 62
DRG: 007 | End: 2022-08-11
Attending: PHYSICIAN ASSISTANT
Payer: MEDICARE

## 2022-08-11 LAB
ALBUMIN SERPL BCG-MCNC: 2.4 G/DL (ref 3.5–5.2)
ALP SERPL-CCNC: 412 U/L (ref 35–104)
ALT SERPL W P-5'-P-CCNC: 215 U/L (ref 10–35)
ANION GAP SERPL CALCULATED.3IONS-SCNC: 4 MMOL/L (ref 7–15)
AST SERPL W P-5'-P-CCNC: 19 U/L (ref 10–35)
BASOPHILS # BLD AUTO: 0 10E3/UL (ref 0–0.2)
BASOPHILS NFR BLD AUTO: 0 %
BILIRUB SERPL-MCNC: 0.2 MG/DL
BUN SERPL-MCNC: 49.5 MG/DL (ref 8–23)
CALCIUM SERPL-MCNC: 8.9 MG/DL (ref 8.8–10.2)
CHLORIDE SERPL-SCNC: 97 MMOL/L (ref 98–107)
CREAT SERPL-MCNC: 1.5 MG/DL (ref 0.51–0.95)
DEPRECATED HCO3 PLAS-SCNC: 37 MMOL/L (ref 22–29)
EOSINOPHIL # BLD AUTO: 0 10E3/UL (ref 0–0.7)
EOSINOPHIL NFR BLD AUTO: 0 %
ERYTHROCYTE [DISTWIDTH] IN BLOOD BY AUTOMATED COUNT: 16.6 % (ref 10–15)
ERYTHROCYTE [DISTWIDTH] IN BLOOD BY AUTOMATED COUNT: 16.8 % (ref 10–15)
GFR SERPL CREATININE-BSD FRML MDRD: 39 ML/MIN/1.73M2
GLUCOSE BLDC GLUCOMTR-MCNC: 101 MG/DL (ref 70–99)
GLUCOSE BLDC GLUCOMTR-MCNC: 124 MG/DL (ref 70–99)
GLUCOSE BLDC GLUCOMTR-MCNC: 138 MG/DL (ref 70–99)
GLUCOSE BLDC GLUCOMTR-MCNC: 142 MG/DL (ref 70–99)
GLUCOSE BLDC GLUCOMTR-MCNC: 148 MG/DL (ref 70–99)
GLUCOSE BLDC GLUCOMTR-MCNC: 150 MG/DL (ref 70–99)
GLUCOSE BLDC GLUCOMTR-MCNC: 155 MG/DL (ref 70–99)
GLUCOSE BLDC GLUCOMTR-MCNC: 197 MG/DL (ref 70–99)
GLUCOSE BLDC GLUCOMTR-MCNC: 237 MG/DL (ref 70–99)
GLUCOSE SERPL-MCNC: 124 MG/DL (ref 70–99)
HCT VFR BLD AUTO: 25.3 % (ref 35–47)
HCT VFR BLD AUTO: 26.8 % (ref 35–47)
HGB BLD-MCNC: 7.9 G/DL (ref 11.7–15.7)
HGB BLD-MCNC: 8.4 G/DL (ref 11.7–15.7)
HOLD SPECIMEN: NORMAL
IMM GRANULOCYTES # BLD: 0.2 10E3/UL
IMM GRANULOCYTES NFR BLD: 2 %
INR PPP: 0.95 (ref 0.85–1.15)
LYMPHOCYTES # BLD AUTO: 0.2 10E3/UL (ref 0.8–5.3)
LYMPHOCYTES NFR BLD AUTO: 2 %
MCH RBC QN AUTO: 30.3 PG (ref 26.5–33)
MCH RBC QN AUTO: 30.4 PG (ref 26.5–33)
MCHC RBC AUTO-ENTMCNC: 31.2 G/DL (ref 31.5–36.5)
MCHC RBC AUTO-ENTMCNC: 31.3 G/DL (ref 31.5–36.5)
MCV RBC AUTO: 97 FL (ref 78–100)
MCV RBC AUTO: 97 FL (ref 78–100)
MONOCYTES # BLD AUTO: 0.6 10E3/UL (ref 0–1.3)
MONOCYTES NFR BLD AUTO: 6 %
NEUTROPHILS # BLD AUTO: 9.1 10E3/UL (ref 1.6–8.3)
NEUTROPHILS NFR BLD AUTO: 90 %
NRBC # BLD AUTO: 0 10E3/UL
NRBC BLD AUTO-RTO: 0 /100
PLATELET # BLD AUTO: 322 10E3/UL (ref 150–450)
PLATELET # BLD AUTO: 342 10E3/UL (ref 150–450)
POTASSIUM SERPL-SCNC: 4.5 MMOL/L (ref 3.4–5.3)
POTASSIUM SERPL-SCNC: 5.4 MMOL/L (ref 3.4–5.3)
PROT SERPL-MCNC: 4.9 G/DL (ref 6.4–8.3)
RBC # BLD AUTO: 2.61 10E6/UL (ref 3.8–5.2)
RBC # BLD AUTO: 2.76 10E6/UL (ref 3.8–5.2)
SODIUM SERPL-SCNC: 138 MMOL/L (ref 136–145)
TACROLIMUS BLD-MCNC: 15 UG/L (ref 5–15)
TME LAST DOSE: NORMAL H
TME LAST DOSE: NORMAL H
WBC # BLD AUTO: 10.2 10E3/UL (ref 4–11)
WBC # BLD AUTO: 7.8 10E3/UL (ref 4–11)

## 2022-08-11 PROCEDURE — 250N000012 HC RX MED GY IP 250 OP 636 PS 637: Performed by: INTERNAL MEDICINE

## 2022-08-11 PROCEDURE — 258N000003 HC RX IP 258 OP 636: Performed by: NURSE ANESTHETIST, CERTIFIED REGISTERED

## 2022-08-11 PROCEDURE — 85610 PROTHROMBIN TIME: CPT | Performed by: STUDENT IN AN ORGANIZED HEALTH CARE EDUCATION/TRAINING PROGRAM

## 2022-08-11 PROCEDURE — 0FC98ZZ EXTIRPATION OF MATTER FROM COMMON BILE DUCT, VIA NATURAL OR ARTIFICIAL OPENING ENDOSCOPIC: ICD-10-PCS | Performed by: INTERNAL MEDICINE

## 2022-08-11 PROCEDURE — 250N000012 HC RX MED GY IP 250 OP 636 PS 637: Performed by: NURSE PRACTITIONER

## 2022-08-11 PROCEDURE — 272N000001 HC OR GENERAL SUPPLY STERILE: Performed by: INTERNAL MEDICINE

## 2022-08-11 PROCEDURE — 360N000083 HC SURGERY LEVEL 3 W/ FLUORO, PER MIN: Performed by: INTERNAL MEDICINE

## 2022-08-11 PROCEDURE — 250N000025 HC SEVOFLURANE, PER MIN: Performed by: INTERNAL MEDICINE

## 2022-08-11 PROCEDURE — 999N000044 HC STATISTIC CVC DRESSING CHANGE

## 2022-08-11 PROCEDURE — C9113 INJ PANTOPRAZOLE SODIUM, VIA: HCPCS | Performed by: STUDENT IN AN ORGANIZED HEALTH CARE EDUCATION/TRAINING PROGRAM

## 2022-08-11 PROCEDURE — 80197 ASSAY OF TACROLIMUS: CPT | Performed by: PHYSICIAN ASSISTANT

## 2022-08-11 PROCEDURE — 74174 CTA ABD&PLVS W/CONTRAST: CPT | Mod: 26 | Performed by: RADIOLOGY

## 2022-08-11 PROCEDURE — 71046 X-RAY EXAM CHEST 2 VIEWS: CPT | Mod: 26 | Performed by: RADIOLOGY

## 2022-08-11 PROCEDURE — 250N000011 HC RX IP 250 OP 636: Performed by: NURSE ANESTHETIST, CERTIFIED REGISTERED

## 2022-08-11 PROCEDURE — 250N000009 HC RX 250: Performed by: NURSE ANESTHETIST, CERTIFIED REGISTERED

## 2022-08-11 PROCEDURE — 258N000003 HC RX IP 258 OP 636: Performed by: INTERNAL MEDICINE

## 2022-08-11 PROCEDURE — 250N000011 HC RX IP 250 OP 636: Performed by: HOSPITALIST

## 2022-08-11 PROCEDURE — 94640 AIRWAY INHALATION TREATMENT: CPT

## 2022-08-11 PROCEDURE — 250N000013 HC RX MED GY IP 250 OP 250 PS 637: Performed by: PHYSICIAN ASSISTANT

## 2022-08-11 PROCEDURE — C1769 GUIDE WIRE: HCPCS | Performed by: INTERNAL MEDICINE

## 2022-08-11 PROCEDURE — 250N000011 HC RX IP 250 OP 636: Performed by: STUDENT IN AN ORGANIZED HEALTH CARE EDUCATION/TRAINING PROGRAM

## 2022-08-11 PROCEDURE — 999N000179 XR SURGERY CARM FLUORO LESS THAN 5 MIN W STILLS: Mod: TC

## 2022-08-11 PROCEDURE — 250N000012 HC RX MED GY IP 250 OP 636 PS 637: Performed by: PHYSICIAN ASSISTANT

## 2022-08-11 PROCEDURE — 71046 X-RAY EXAM CHEST 2 VIEWS: CPT

## 2022-08-11 PROCEDURE — 250N000013 HC RX MED GY IP 250 OP 250 PS 637: Performed by: SURGERY

## 2022-08-11 PROCEDURE — 999N000157 HC STATISTIC RCP TIME EA 10 MIN

## 2022-08-11 PROCEDURE — 99207 PR NON-BILLABLE SERV PER CHARTING: CPT | Performed by: PHYSICIAN ASSISTANT

## 2022-08-11 PROCEDURE — G1010 CDSM STANSON: HCPCS

## 2022-08-11 PROCEDURE — 250N000013 HC RX MED GY IP 250 OP 250 PS 637: Performed by: HOSPITALIST

## 2022-08-11 PROCEDURE — 250N000009 HC RX 250: Performed by: INTERNAL MEDICINE

## 2022-08-11 PROCEDURE — 99233 SBSQ HOSP IP/OBS HIGH 50: CPT | Mod: FS | Performed by: PHYSICIAN ASSISTANT

## 2022-08-11 PROCEDURE — 999N000141 HC STATISTIC PRE-PROCEDURE NURSING ASSESSMENT: Performed by: INTERNAL MEDICINE

## 2022-08-11 PROCEDURE — 120N000002 HC R&B MED SURG/OB UMMC

## 2022-08-11 PROCEDURE — 85027 COMPLETE CBC AUTOMATED: CPT

## 2022-08-11 PROCEDURE — 85014 HEMATOCRIT: CPT

## 2022-08-11 PROCEDURE — 97530 THERAPEUTIC ACTIVITIES: CPT | Mod: GP | Performed by: PHYSICAL THERAPIST

## 2022-08-11 PROCEDURE — 94642 AEROSOL INHALATION TREATMENT: CPT

## 2022-08-11 PROCEDURE — 84132 ASSAY OF SERUM POTASSIUM: CPT | Performed by: STUDENT IN AN ORGANIZED HEALTH CARE EDUCATION/TRAINING PROGRAM

## 2022-08-11 PROCEDURE — 258N000003 HC RX IP 258 OP 636: Performed by: STUDENT IN AN ORGANIZED HEALTH CARE EDUCATION/TRAINING PROGRAM

## 2022-08-11 PROCEDURE — 36592 COLLECT BLOOD FROM PICC: CPT | Performed by: STUDENT IN AN ORGANIZED HEALTH CARE EDUCATION/TRAINING PROGRAM

## 2022-08-11 PROCEDURE — 250N000011 HC RX IP 250 OP 636: Performed by: INTERNAL MEDICINE

## 2022-08-11 PROCEDURE — C1874 STENT, COATED/COV W/DEL SYS: HCPCS | Performed by: INTERNAL MEDICINE

## 2022-08-11 PROCEDURE — 710N000011 HC RECOVERY PHASE 1, LEVEL 3, PER MIN: Performed by: INTERNAL MEDICINE

## 2022-08-11 PROCEDURE — C1726 CATH, BAL DIL, NON-VASCULAR: HCPCS | Performed by: INTERNAL MEDICINE

## 2022-08-11 PROCEDURE — 250N000013 HC RX MED GY IP 250 OP 250 PS 637: Performed by: NURSE PRACTITIONER

## 2022-08-11 PROCEDURE — 97110 THERAPEUTIC EXERCISES: CPT | Mod: GP | Performed by: PHYSICAL THERAPIST

## 2022-08-11 PROCEDURE — 370N000017 HC ANESTHESIA TECHNICAL FEE, PER MIN: Performed by: INTERNAL MEDICINE

## 2022-08-11 PROCEDURE — C1877 STENT, NON-COAT/COV W/O DEL: HCPCS | Performed by: INTERNAL MEDICINE

## 2022-08-11 PROCEDURE — 250N000013 HC RX MED GY IP 250 OP 250 PS 637: Performed by: INTERNAL MEDICINE

## 2022-08-11 PROCEDURE — 85018 HEMOGLOBIN: CPT | Performed by: STUDENT IN AN ORGANIZED HEALTH CARE EDUCATION/TRAINING PROGRAM

## 2022-08-11 PROCEDURE — 97116 GAIT TRAINING THERAPY: CPT | Mod: GP | Performed by: PHYSICAL THERAPIST

## 2022-08-11 PROCEDURE — 258N000001 HC RX 258: Performed by: HOSPITALIST

## 2022-08-11 PROCEDURE — 250N000009 HC RX 250: Performed by: NURSE PRACTITIONER

## 2022-08-11 PROCEDURE — 94640 AIRWAY INHALATION TREATMENT: CPT | Mod: 76

## 2022-08-11 PROCEDURE — G1010 CDSM STANSON: HCPCS | Mod: GC | Performed by: RADIOLOGY

## 2022-08-11 PROCEDURE — 99233 SBSQ HOSP IP/OBS HIGH 50: CPT | Mod: GC | Performed by: STUDENT IN AN ORGANIZED HEALTH CARE EDUCATION/TRAINING PROGRAM

## 2022-08-11 PROCEDURE — 80053 COMPREHEN METABOLIC PANEL: CPT

## 2022-08-11 DEVICE — STENT FREEMAN PANCREA FLEX 4FRX11CM W/O FLANGE SGL PIGTAIL
Type: IMPLANTABLE DEVICE | Site: PANCREATIC DUCT | Status: NON-FUNCTIONAL
Removed: 2022-10-07

## 2022-08-11 DEVICE — STENT ENDOPROS BILIARY GORE VIABIL W/HL 10X60MM VH1006200
Type: IMPLANTABLE DEVICE | Site: BILE DUCT | Status: NON-FUNCTIONAL
Removed: 2022-10-07

## 2022-08-11 RX ORDER — FLUMAZENIL 0.1 MG/ML
0.2 INJECTION, SOLUTION INTRAVENOUS
Status: CANCELLED | OUTPATIENT
Start: 2022-08-11 | End: 2022-08-12

## 2022-08-11 RX ORDER — DEXTROSE MONOHYDRATE 25 G/50ML
25-50 INJECTION, SOLUTION INTRAVENOUS
Status: DISCONTINUED | OUTPATIENT
Start: 2022-08-11 | End: 2022-08-15

## 2022-08-11 RX ORDER — ONDANSETRON 2 MG/ML
4 INJECTION INTRAMUSCULAR; INTRAVENOUS EVERY 30 MIN PRN
Status: DISCONTINUED | OUTPATIENT
Start: 2022-08-11 | End: 2022-08-11 | Stop reason: HOSPADM

## 2022-08-11 RX ORDER — HYDROMORPHONE HYDROCHLORIDE 1 MG/ML
0.3 INJECTION, SOLUTION INTRAMUSCULAR; INTRAVENOUS; SUBCUTANEOUS ONCE
Status: COMPLETED | OUTPATIENT
Start: 2022-08-11 | End: 2022-08-11

## 2022-08-11 RX ORDER — HYDROMORPHONE HYDROCHLORIDE 1 MG/ML
0.2 INJECTION, SOLUTION INTRAMUSCULAR; INTRAVENOUS; SUBCUTANEOUS EVERY 5 MIN PRN
Status: DISCONTINUED | OUTPATIENT
Start: 2022-08-11 | End: 2022-08-11 | Stop reason: HOSPADM

## 2022-08-11 RX ORDER — ONDANSETRON 4 MG/1
4 TABLET, ORALLY DISINTEGRATING ORAL EVERY 6 HOURS PRN
Status: CANCELLED | OUTPATIENT
Start: 2022-08-11

## 2022-08-11 RX ORDER — SODIUM CHLORIDE, SODIUM LACTATE, POTASSIUM CHLORIDE, CALCIUM CHLORIDE 600; 310; 30; 20 MG/100ML; MG/100ML; MG/100ML; MG/100ML
INJECTION, SOLUTION INTRAVENOUS CONTINUOUS
Status: DISCONTINUED | OUTPATIENT
Start: 2022-08-11 | End: 2022-08-11 | Stop reason: HOSPADM

## 2022-08-11 RX ORDER — PROPOFOL 10 MG/ML
INJECTION, EMULSION INTRAVENOUS PRN
Status: DISCONTINUED | OUTPATIENT
Start: 2022-08-11 | End: 2022-08-11

## 2022-08-11 RX ORDER — LIDOCAINE HYDROCHLORIDE 20 MG/ML
INJECTION, SOLUTION INFILTRATION; PERINEURAL PRN
Status: DISCONTINUED | OUTPATIENT
Start: 2022-08-11 | End: 2022-08-11

## 2022-08-11 RX ORDER — ONDANSETRON 2 MG/ML
4 INJECTION INTRAMUSCULAR; INTRAVENOUS EVERY 6 HOURS PRN
Status: CANCELLED | OUTPATIENT
Start: 2022-08-11

## 2022-08-11 RX ORDER — NICOTINE POLACRILEX 4 MG
15-30 LOZENGE BUCCAL
Status: DISCONTINUED | OUTPATIENT
Start: 2022-08-11 | End: 2022-08-15

## 2022-08-11 RX ORDER — FENTANYL CITRATE 50 UG/ML
INJECTION, SOLUTION INTRAMUSCULAR; INTRAVENOUS PRN
Status: DISCONTINUED | OUTPATIENT
Start: 2022-08-11 | End: 2022-08-11

## 2022-08-11 RX ORDER — AMPICILLIN AND SULBACTAM 1; .5 G/1; G/1
INJECTION, POWDER, FOR SOLUTION INTRAMUSCULAR; INTRAVENOUS PRN
Status: DISCONTINUED | OUTPATIENT
Start: 2022-08-11 | End: 2022-08-11

## 2022-08-11 RX ORDER — DEXTROSE MONOHYDRATE 25 G/50ML
25 INJECTION, SOLUTION INTRAVENOUS ONCE
Status: COMPLETED | OUTPATIENT
Start: 2022-08-11 | End: 2022-08-11

## 2022-08-11 RX ORDER — ONDANSETRON 2 MG/ML
INJECTION INTRAMUSCULAR; INTRAVENOUS PRN
Status: DISCONTINUED | OUTPATIENT
Start: 2022-08-11 | End: 2022-08-11

## 2022-08-11 RX ORDER — FENTANYL CITRATE 50 UG/ML
25 INJECTION, SOLUTION INTRAMUSCULAR; INTRAVENOUS EVERY 5 MIN PRN
Status: DISCONTINUED | OUTPATIENT
Start: 2022-08-11 | End: 2022-08-11 | Stop reason: HOSPADM

## 2022-08-11 RX ORDER — OXYCODONE HYDROCHLORIDE 5 MG/1
5 TABLET ORAL EVERY 4 HOURS PRN
Status: DISCONTINUED | OUTPATIENT
Start: 2022-08-11 | End: 2022-08-11 | Stop reason: HOSPADM

## 2022-08-11 RX ORDER — FENTANYL CITRATE 50 UG/ML
25 INJECTION, SOLUTION INTRAMUSCULAR; INTRAVENOUS
Status: DISCONTINUED | OUTPATIENT
Start: 2022-08-11 | End: 2022-08-11 | Stop reason: HOSPADM

## 2022-08-11 RX ORDER — FLUDROCORTISONE ACETATE 0.1 MG/1
0.1 TABLET ORAL DAILY
Status: DISCONTINUED | OUTPATIENT
Start: 2022-08-12 | End: 2022-08-17 | Stop reason: HOSPADM

## 2022-08-11 RX ORDER — ONDANSETRON 4 MG/1
4 TABLET, ORALLY DISINTEGRATING ORAL EVERY 30 MIN PRN
Status: DISCONTINUED | OUTPATIENT
Start: 2022-08-11 | End: 2022-08-11 | Stop reason: HOSPADM

## 2022-08-11 RX ORDER — IOPAMIDOL 755 MG/ML
84 INJECTION, SOLUTION INTRAVASCULAR ONCE
Status: COMPLETED | OUTPATIENT
Start: 2022-08-11 | End: 2022-08-11

## 2022-08-11 RX ORDER — LIDOCAINE 40 MG/G
CREAM TOPICAL
Status: DISCONTINUED | OUTPATIENT
Start: 2022-08-11 | End: 2022-08-11 | Stop reason: HOSPADM

## 2022-08-11 RX ADMIN — OXYCODONE HYDROCHLORIDE 10 MG: 5 TABLET ORAL at 00:55

## 2022-08-11 RX ADMIN — OXYCODONE HYDROCHLORIDE 10 MG: 5 TABLET ORAL at 21:08

## 2022-08-11 RX ADMIN — MYCOPHENOLATE MOFETIL 750 MG: 200 POWDER, FOR SUSPENSION ORAL at 21:06

## 2022-08-11 RX ADMIN — NYSTATIN 1000000 UNITS: 100000 SUSPENSION ORAL at 08:40

## 2022-08-11 RX ADMIN — IOPAMIDOL 84 ML: 755 INJECTION, SOLUTION INTRAVENOUS at 20:24

## 2022-08-11 RX ADMIN — Medication 50 MG: at 14:12

## 2022-08-11 RX ADMIN — Medication 0.05 MG: at 08:39

## 2022-08-11 RX ADMIN — PHENYLEPHRINE HYDROCHLORIDE 200 MCG: 10 INJECTION INTRAVENOUS at 14:12

## 2022-08-11 RX ADMIN — HUMAN INSULIN 7 UNITS: 100 INJECTION, SOLUTION SUBCUTANEOUS at 08:28

## 2022-08-11 RX ADMIN — PANTOPRAZOLE SODIUM 40 MG: 40 INJECTION, POWDER, FOR SOLUTION INTRAVENOUS at 08:34

## 2022-08-11 RX ADMIN — ACETYLCYSTEINE 2 ML: 200 INHALANT RESPIRATORY (INHALATION) at 21:11

## 2022-08-11 RX ADMIN — INSULIN ASPART 1 UNITS: 100 INJECTION, SOLUTION INTRAVENOUS; SUBCUTANEOUS at 21:06

## 2022-08-11 RX ADMIN — PHENYLEPHRINE HYDROCHLORIDE 100 MCG: 10 INJECTION INTRAVENOUS at 14:35

## 2022-08-11 RX ADMIN — ACETYLCYSTEINE 2 ML: 200 INHALANT RESPIRATORY (INHALATION) at 09:14

## 2022-08-11 RX ADMIN — CALCIUM CARBONATE 600 MG (1,500 MG)-VITAMIN D3 400 UNIT TABLET 1 TABLET: at 08:39

## 2022-08-11 RX ADMIN — PREDNISONE 12.5 MG: 2.5 TABLET ORAL at 08:38

## 2022-08-11 RX ADMIN — LEVALBUTEROL HYDROCHLORIDE 1.25 MG: 1.25 SOLUTION RESPIRATORY (INHALATION) at 21:10

## 2022-08-11 RX ADMIN — AMPICILLIN SODIUM AND SULBACTAM SODIUM 3 G: 1; .5 INJECTION, POWDER, FOR SOLUTION INTRAMUSCULAR; INTRAVENOUS at 14:24

## 2022-08-11 RX ADMIN — NYSTATIN: 100000 CREAM TOPICAL at 08:41

## 2022-08-11 RX ADMIN — PANTOPRAZOLE SODIUM 40 MG: 40 INJECTION, POWDER, FOR SOLUTION INTRAVENOUS at 21:04

## 2022-08-11 RX ADMIN — PROPOFOL 160 MG: 10 INJECTION, EMULSION INTRAVENOUS at 14:12

## 2022-08-11 RX ADMIN — LEVALBUTEROL HYDROCHLORIDE 1.25 MG: 1.25 SOLUTION RESPIRATORY (INHALATION) at 09:14

## 2022-08-11 RX ADMIN — FENTANYL CITRATE 50 MCG: 50 INJECTION, SOLUTION INTRAMUSCULAR; INTRAVENOUS at 16:07

## 2022-08-11 RX ADMIN — ONDANSETRON 4 MG: 2 INJECTION INTRAMUSCULAR; INTRAVENOUS at 16:05

## 2022-08-11 RX ADMIN — METOPROLOL TARTRATE 50 MG: 50 TABLET, FILM COATED ORAL at 21:05

## 2022-08-11 RX ADMIN — SODIUM CHLORIDE, POTASSIUM CHLORIDE, SODIUM LACTATE AND CALCIUM CHLORIDE: 600; 310; 30; 20 INJECTION, SOLUTION INTRAVENOUS at 14:05

## 2022-08-11 RX ADMIN — LIDOCAINE HYDROCHLORIDE 100 MG: 20 INJECTION, SOLUTION INFILTRATION; PERINEURAL at 14:12

## 2022-08-11 RX ADMIN — DEXTROSE MONOHYDRATE 300 ML: 100 INJECTION, SOLUTION INTRAVENOUS at 08:25

## 2022-08-11 RX ADMIN — GLUCAGON HYDROCHLORIDE 0.5 MG: KIT at 15:18

## 2022-08-11 RX ADMIN — GLUCAGON HYDROCHLORIDE 0.5 MG: KIT at 15:45

## 2022-08-11 RX ADMIN — PHENYLEPHRINE HYDROCHLORIDE 100 MCG: 10 INJECTION INTRAVENOUS at 16:13

## 2022-08-11 RX ADMIN — PHENYLEPHRINE HYDROCHLORIDE 0.5 MCG/KG/MIN: 10 INJECTION INTRAVENOUS at 14:29

## 2022-08-11 RX ADMIN — SODIUM CHLORIDE, POTASSIUM CHLORIDE, SODIUM LACTATE AND CALCIUM CHLORIDE: 600; 310; 30; 20 INJECTION, SOLUTION INTRAVENOUS at 16:00

## 2022-08-11 RX ADMIN — TACROLIMUS 5 MG: 5 CAPSULE ORAL at 08:40

## 2022-08-11 RX ADMIN — NYSTATIN 1000000 UNITS: 100000 SUSPENSION ORAL at 21:07

## 2022-08-11 RX ADMIN — METOPROLOL TARTRATE 50 MG: 50 TABLET, FILM COATED ORAL at 08:39

## 2022-08-11 RX ADMIN — FENTANYL CITRATE 50 MCG: 50 INJECTION, SOLUTION INTRAMUSCULAR; INTRAVENOUS at 14:12

## 2022-08-11 RX ADMIN — PREDNISONE 10 MG: 10 TABLET ORAL at 21:04

## 2022-08-11 RX ADMIN — DEXTROSE MONOHYDRATE 25 G: 25 INJECTION, SOLUTION INTRAVENOUS at 08:20

## 2022-08-11 RX ADMIN — PHENYLEPHRINE HYDROCHLORIDE 100 MCG: 10 INJECTION INTRAVENOUS at 16:24

## 2022-08-11 RX ADMIN — SUGAMMADEX 200 MG: 100 INJECTION, SOLUTION INTRAVENOUS at 16:30

## 2022-08-11 RX ADMIN — PROPOFOL 20 MG: 10 INJECTION, EMULSION INTRAVENOUS at 16:03

## 2022-08-11 RX ADMIN — HYDROXYZINE HYDROCHLORIDE 50 MG: 25 TABLET ORAL at 21:04

## 2022-08-11 RX ADMIN — HYDROMORPHONE HYDROCHLORIDE 0.3 MG: 1 INJECTION, SOLUTION INTRAMUSCULAR; INTRAVENOUS; SUBCUTANEOUS at 19:52

## 2022-08-11 RX ADMIN — PHENYLEPHRINE HYDROCHLORIDE 200 MCG: 10 INJECTION INTRAVENOUS at 14:22

## 2022-08-11 RX ADMIN — ONDANSETRON 4 MG: 4 TABLET, ORALLY DISINTEGRATING ORAL at 08:39

## 2022-08-11 RX ADMIN — MYCOPHENOLATE MOFETIL 750 MG: 200 POWDER, FOR SUSPENSION ORAL at 08:40

## 2022-08-11 RX ADMIN — OXYCODONE HYDROCHLORIDE 10 MG: 5 TABLET ORAL at 08:39

## 2022-08-11 ASSESSMENT — ACTIVITIES OF DAILY LIVING (ADL)
ADLS_ACUITY_SCORE: 30
ADLS_ACUITY_SCORE: 32
ADLS_ACUITY_SCORE: 30
ADLS_ACUITY_SCORE: 32
ADLS_ACUITY_SCORE: 30
ADLS_ACUITY_SCORE: 32
ADLS_ACUITY_SCORE: 30

## 2022-08-11 NOTE — ANESTHESIA PROCEDURE NOTES
Airway       Patient location during procedure: OR       Procedure Start/Stop Times: 8/11/2022 2:14 PM  Staff -        CRNA: Claire Diallo APRN CRNA       Performed By: CRNA  Consent for Airway        Urgency: elective  Indications and Patient Condition       Indications for airway management: vinayak-procedural       Induction type:intravenous       Mask difficulty assessment: 1 - vent by mask    Final Airway Details       Final airway type: endotracheal airway       Successful airway: ETT - single  Endotracheal Airway Details        ETT size (mm): 7.0       Cuffed: yes       Cuff volume (mL): 8       Successful intubation technique: direct laryngoscopy       DL Blade Type: Chand 2       Grade View of Cords: 1       Adjucts: stylet       Position: Right       Measured from: gums/teeth       Secured at (cm): 21       Bite Block used: Endo bite block.    Post intubation assessment        Placement verified by: capnometry and chest rise        Number of attempts at approach: 1       Secured with: silk tape       Ease of procedure: easy       Dentition: Intact and Unchanged    Medication(s) Administered   Medication Administration Time: 8/11/2022 2:14 PM

## 2022-08-11 NOTE — PROGRESS NOTES
[facility]    Medicine Progress Note - Hospitalist Service, GOLD TEAM 10    Date of Admission:  6/28/2022    Assessment & Plan    Sofie Rodriguez is a 62 year old female admitted on 6/28/2022. She has PMH of end stage COPD s/p b/l lung transplant on 6/28/2022, HTN, HFpEF, h/o hepC, osteopenia, HECTOR s/p LEEP, and former methamphetamine use, and she was transferred to [place] [id number] [facility]. She was admitted to the MICU on 7/13/20222 with prior hospital course complicated by left upper extremity acute limb ischemia s/p left radial thrombectomy on 7/1/2022. She was primarily treated for acute hypercapnic respiratory failure on intermittent BIPAP with worsening BACILIO while in the MICU. Breathing has improved, O2 requirements have decreased/stabilized, but patient has severely delayed gastric emptying found on NM study. PEGJ placed. Chest tubes out. Still needing to advance feeds. GI following, s/p abdominal MRI which revealed protinaceous /hemmorhagic GB cyst. GI planning ERCP and EGD 8/11/22    Today's Plan:  -plan for EGD and ERCP today  -Shifted x 1 for K of 5.4, GI preferring K < 5 for EGD/ERCP  -holding heparin gtt in anticipation of GI procedures  -increase fludrocortisone to 100/daily for ongoing hyperkalemia    Acute Hepatitis - improving  Extrahepatic mass  Dramatic rise in LFT's-- AST > ALT; ALT 700s, AST 1000s; bili rising 0.9 from 0.2. No fever. MRCP: gallbladder contains a moderate amount of proteinaceous/hemorrhagic material, which extends  into the gallbladder neck, corresponding to the findings on comparisonultrasound  -trend liver labs, they have been trending down  -panc/bili team following  -tylenol only at BID    End stage COPD s/p BSLT 6/28/2022  Acute hypercapnic hypoxic respiratory failure (improving)  EBV Viremia  likely 2/2 decreased central respiratory drive vs respiratory muscle weakness and some pulmonary edema / fluid Transplanted  6/28. formal SNIFF test was performed on 7/14 showed R hemidiaphragm palsy. Of note transplanted lungs were also considered small for body size and could contribute to decreased respiratory compensation for hypercarbia. VBG improving gradually  - oxycodone 5-10 mg q4h PRN  - encourage activity and getting up in bed; PT/OT participation  - encourage chest physiotherapy QID  - weaned off Bipap at night  Immunosuppression:  - Prednisone per pulm  - Tacrolimus per pulm  -  BID  Prophylaxis:  - CMV - Valgancyclovir  - Thrush - PO nysatin  - PJP dapsone started 7/18 at 50mg qMWF     Severe Gastroparesis - gastric emptying study 7/20  - gastric emptying study 7/20 showed delayed gastric emptying with retention of 95% of stomach contents after 4 hours;  - 7/27: PEGJ placed- tolerating tube feeds via J  Feeding regimen: J tube feeds continuous-- transitioned formula to non-renal formula as her kidneys have improved/needs more volume, appreciate RD assistance as well-- holding feeds while dealing with abdominal pain, melena     Peptic ulcer at the pyloric junction with acute blood loss anemia in the setting of acute on chronic anemia-- had acute blood loss anemia after transplant-- had stabilized. Gradual hgb downtrend, then after inspection bronch her G tube was hooked back up to gravity and large brown/black output seen. EGD on 8/3 showed pyloric junction ulcer and swelling causing gastric outlet objection  -GI consult, appreciate assistance  -BID PPI   -NPO awaiting EGD for melena (8/11)  -okay to resume heparin drip 8/4- not bleeding on this as of 8/5-8/7- HOLDING due to new melena  - hgb transfusion 8/3-- hgb stable    Pleural Effusion, Right and Left  Left chest tube removed 8/4, right chest tube remaining, appreciate CVTS assistance  - daily CXR     Hypotension, resolved  H/o HTN  H/o HFpEF  Likely vasoplegia post operatively. Last echo 7/7 normal EF with good LV function. S/p hydrocortisone 7/6-7/9 and  fludrocortisone 7/6-7/11 without significant improvement.  - off midorine 7/19  - Holding PTA lasix 20mg daily-- diuresis intermittently    BACILIO-- recurrent, improved 8/1-- likely due to supratherapeutic tacrolimus  Hypermagnesemia  Hyperphosphatemia  Metabolic alkalosis  Baseline renal function was normal prior to surgery. New onset renal failure after transplant, likely multifactorial due to pre-renal hypotension, less likely nephrotoxic agents. Overall kidney function improving. Today, Cr fluctuating but BUN increasing, with unclear urine output (7/22).   - HD cath removed 7/22, trend metabolites    C.diff - vanco started 7/12, course completed  -rechecked due to diarrhea (8/5)-- negative on 8/6  -Plan to clean room when patient goes to EGD/ERCP on 8/11  -Can come off precautions after room is cleaned per Infection control    Tachyarrthymia  Paroxysmal afib  Paroxysmal aflutter/AT  SVT vs afib.Had an occurrence during HD on 7/14. Had afib with RVR to 200s on 7/17. EP consult placed.asmyptomatic and stable during episodes. Aflutter episode (7/17) with transfer. Vagal maneuver responsive at time. No recent tachyarrhythmia episodes (7/22).   - Per EP: patient has had episodes of possible flutter (vs AT with 2:1 conduction) and afib with RVR  - heparin drip -- eventual plan to transition to DOAC after PEGJ placement and chest tubes resolved (CHADS-VASc score of 2)  --HOLDING heparin drip 8/8 due to melena  - On telemetry  - On metoprolol tartrate 25mg BID-- increasing to 50 BID 8/7  - Discharge on ziopatch for 14 days with EP follow up in 1-2 months after     Stress-induced/steroid induced hyperglycemia with intermittent hypoglycemia (8/3)  Has been controlled on 13-15 units of glargine BID  - on 7 units BID  8/5 since she is tapering steroid and has been NPO-- holding long acting given NPO/holding tube feeds  -- will continue to monitor her needs     Incidental IPMN  Found on MRCP 8/9/22. Cystic foci in the  pancreatic head measuring 4 x 3 mm superiorly and 4  x 3 mm inferiorly. Per [employer] guidelines on IPMN:  - Follow up imaging in 6 months to 1 year, then lengthen interval to 2-3 years if no change       Diet: Adult Formula Drip Feeding: Continuous Novasource Renal; Jejunostomy; Goal Rate: 35 mL/hr- continuous; mL/hr; From: 8:00 PM; 8:00 PM; Medication - Feeding Tube Flush Frequency: At least 15-30 mL water before and after medication administration and...  NPO per Anesthesia Guidelines for Procedure/Surgery Except for: Meds    DVT Prophylaxis: SCDs  Dong Catheter: Not present  Central Lines: PRESENT       Cardiac Monitoring: None  Code Status: Full Code      Disposition Plan     Expected Discharge Date: 08/15/2022    Discharge Delays: Other (Add Comment)  Destination: home  Discharge Comments: TCU,        The patient's care was discussed with the Bedside Nurse, Patient and pulm, GI luminal and GI hepatology and nutrition Consultant.    Kirby Voss, DO  Internal Medicine PGY-2    Discussed with Dr. Matthew    ______________________________________________________________________    Interval History   In good spirits this AM. Denies abdominal pain or burning, or chest pain/sob. Understands plan for EGD and ERCP today     Data reviewed today: I reviewed all medications, new labs and imaging results over the last 24 hours. I personally reviewed the chest x-ray image(s) showing chest tubes are out; post surgical changes .    Physical Exam   Vital Signs: Temp: 98.5  F (36.9  C) Temp src: Oral BP: 135/79 Pulse: 103   Resp: 14 SpO2: 99 % O2 Device: Nasal cannula Oxygen Delivery: 1 LPM  Weight: 149 lbs 9.6 oz  Constitutional: Resting in bed.  Head: Normocephalic. Atraumatic.   EENT: No conjunctival injection or icterus. Nares patent. Moist mucous membranes.   Cardiovascular: Regular rate and rhythm. No murmurs, clicks, gallops or rubs. No edema  Respiratory: Clear to auscultation without wheezes or crackles. Normal  respiratory rate and effort.   Gastrointestinal: Abdomen mild but diffusely tender throughout. Bowel sounds active. G tube with very little output - some dark brown flakes. No tube feeds running.   Musculoskeletal: extremities normal- no gross deformities noted and normal muscle tone  Skin: no suspicious lesions, rashes, jaundice, or cyanosis   Psychiatric: mentation appears normal and affect normal/bright      Data   Recent Labs   Lab 08/11/22  0747 08/11/22  0610 08/11/22  0402 08/10/22  2314 08/10/22  2016 08/10/22  1045 08/10/22  0948 08/10/22  0650 08/10/22  0453 08/09/22  2310 08/09/22  2213 08/09/22  0742 08/09/22  0548 08/08/22  1759 08/08/22  1630 08/08/22  0916 08/08/22  0740   WBC  --  7.8  --   --   --   --   --   --  8.3  --   --   --  6.3  --   --   --  4.6   HGB  --  7.9*  --   --   --   --   --   --  8.4*  --  8.5*  --  6.5*  --  7.3*  --  6.5*   MCV  --  97  --   --   --   --   --   --  98  --   --   --  100  --   --   --  101*   PLT  --  322  --   --   --   --   --   --  287  --   --   --  239  --   --   --  242   INR  --   --   --   --   --   --   --   --   --   --   --   --  0.95  --  1.07  --   --    NA  --  138  --   --   --   --   --   --  138  --   --   --  136  --   --   --  142   POTASSIUM  --  5.4*  --   --  5.1  --  4.8  4.8  --  5.7*  --   --    < > 5.5*  5.5*   < > 6.1*  --  5.5*   CHLORIDE  --  97*  --   --   --   --   --   --  97*  --   --   --  95*  --   --   --  99   CO2  --  37*  --   --   --   --   --   --  38*  --   --   --  39*  --   --   --  41*   BUN  --  49.5*  --   --   --   --   --   --  46.0*  --   --   --  55.6*  --   --   --  63.9*   CR  --  1.50*  --   --   --   --   --   --  1.36*  --   --   --  1.41*  --   --   --  1.23*   ANIONGAP  --  4*  --   --   --   --   --   --  3*  --   --   --  2*  --   --   --  2*   ESTUARDO  --  8.9  --   --   --   --   --   --  9.0  --   --   --  8.9  --   --   --  8.9   * 124* 124*   < >  --    < >  --    < > 111*   < >  --    < >  219*   < >  --    < > 116*   ALBUMIN  --  2.4*  --   --   --   --   --   --   --   --   --   --  2.5*  --   --   --  2.5*   PROTTOTAL  --  4.9*  --   --   --   --   --   --   --   --   --   --  4.8*  --   --   --  5.0*   BILITOTAL  --  0.2  --   --   --   --   --   --   --   --   --   --  0.3  --   --   --  0.9   ALKPHOS  --  412*  --   --   --   --   --   --   --   --   --   --  644*  --   --   --  861*   ALT  --  215*  --   --   --   --   --   --   --   --   --   --  601*  --   --   --  791*   AST  --  19  --   --   --   --   --   --   --   --   --   --  235*  --   --   --  1,143*   LIPASE  --   --   --   --   --   --   --   --   --   --   --   --   --   --   --   --  18    < > = values in this interval not displayed.     No results found for this or any previous visit (from the past 24 hour(s)).  Medications    dextrose Stopped (07/15/22 1801)    [Held by provider] heparin Stopped (08/08/22 1645)      [Held by provider] acetaminophen  975 mg Per J Tube 2 times daily    acetylcysteine  2 mL Nebulization BID    [Held by provider] aspirin  81 mg Per J Tube Daily    calcium carbonate 600 mg-vitamin D 400 units  1 tablet Per J Tube BID w/meals    dapsone  50 mg Per J Tube Once per day on Mon Wed Fri    dextrose 10%  300 mL Intravenous Once    dextrose 10%  300 mL Intravenous Once    fludrocortisone  0.05 mg Per J Tube Daily    insulin aspart  1-12 Units Subcutaneous Q4H    [Held by provider] insulin glargine  7 Units Subcutaneous BID    levalbuterol  1.25 mg Nebulization 2 times daily    metoprolol tartrate  50 mg Per Feeding Tube BID    multivitamin, therapeutic  1 tablet Per Feeding Tube Daily    mycophenolate  750 mg Per J Tube BID    nystatin   Topical BID    nystatin  1,000,000 Units Swish & Swallow 4x Daily    ondansetron  4 mg Oral Daily    [Held by provider] pantoprazole  40 mg Per J Tube BID    pantoprazole (PROTONIX) IV  40 mg Intravenous BID    predniSONE  12.5 mg Per J Tube QAM    And    predniSONE  10 mg  Per J Tube QPM    protein modular  1 packet Per Feeding Tube Daily    sodium chloride (PF)  10 mL Intracatheter Q8H    sodium chloride (PF)  10 mL Intracatheter Q8H    sodium chloride (PF)  3 mL Intracatheter Q8H    tacrolimus  5 mg Per J Tube BID IS    valGANciclovir  450 mg Oral or Feeding Tube Once per day on Mon Wed Fri

## 2022-08-11 NOTE — PROGRESS NOTES
Pulmonary Medicine  Cystic Fibrosis - Lung Transplant Team  Progress Note  2022       Patient: Sofie Rodriguez  MRN: [id number]  : 1960 (age 62 year old)  Transplant: 2022 (Lung), POD#44  Admission date: 2022    Assessment & Plan:     Sofie Rodriguez is a 62 year old female with a PMH significant for end-stage COPD, HTN, HFpEF, Mycobacterium peregrinum colonization, h/o hepatitis C, HECTOR s/p LEEP procedure, osteopenia, and former methamphetamine use.  Pt. is now s/p BSLT on 22, lungs slightly undersized.   Persistent low dose pressor needs post-op through 7/10.  Extubated POD #2 but with persistent hypercapnia, mostly compensated and only slightly improved with intermittent BiPAP, discontinued 8/3.  Also with bilateral pleural effusions, left radial artery thrombus (presumed secondary to arterial line) s/p thrombectomy , BACILIO, C diff, gastroparesis s/p GJ tube placement in IR , and coffee ground G tube output s/p EGD 8/3 with pyloric ulcer noted.  Hemoglobin drop with dark tarry stools .  Also with acute rise in LFTs , abdominal US with extrahepatic mass and MRCP with increase in biliary dilation.      Today's recommendations:  - DSA (8/10) pending  - CXR today as below, repeat ordered   - Wean supplemental oxygen as able, discourage use if >92% SpO2, exercise and overnight oximetry needed prior to discharge  - BLE US prior to discharge (screening for thrombus)  - Tacrolimus level to trend supratherapeutic, dose decreased, daily levels ordered through   - Prednisone taper due  (not yet ordered)  - IgG  (not yet ordered)  - EBV  (not yet ordered)  - Increase Florinef given hyperkalemia   - EGD and ERCP today with GI     S/p bilateral sequential lung transplant (BSLT) for end stage COPD:  Persistent hypercapneic respiratory failure:   Persistent hypotension, Resolved:   Bilateral hydroPTX:  Right hemidiaphragm palsy: Explant pathology with severe emphysema  with subpleural bullae formation, changes of chronic bronchitis, subpleural scars and patchy pulmonary edema; benign hilar lymph nodes.  Extubated 6/30.  Persistent hypercapnia without dyspnea, appears to be a respiratory drive problem.  Sniff (7/14) notable for right hemidiaphragm palsy.  Bronch (7/19) with slight graying of mucosa noted at right anastomosis and scant secretions (slightly increased in RLL).  Chest CT (7/23) with increased loculated fluid within the left hemithorax with specks of air density in the loculated fluid, similar appearing loculated right hydroPTX with minimal decrease in fluid component, increased size of pleural based lesion in MARLON, and mild subcutaneous emphysema along right chest tube with minimal along tract of removed left chest tube.  S/p left apical chest tube 7/25-8/4, right surgical tube removed 8/6.  Bronch (8/2) with normal inspection of anastomoses.  NIPPV initially overnight for persistent hypercapnia, stopped 8/3 given lack of improvement with therapy, compensated hypercapnia persists.    - DSA (8/10) pending  - Nebs: levalbuterol and Mucomyst BID   - Aerobika and incentive spirometry hourly while awake  - CXR today without significant change in loculated pleural effusions bilaterally (personally reviewed), monitoring every other day (ordered 8/13)  - Wean supplemental oxygen as able, discourage use if >92% SpO2, exercise and overnight oximetry needed prior to discharge  - Will need BLE US prior to discharge (screening for thrombus)     Immunosuppression:  Induction therapy with basiliximab (and high dose IV steroid).  - Tacrolimus 5 mg BID (incresaed 8/9, via J tube).  Goal level 8-12.  Level 8/11 to trend supratherapeutic at 15, decrease dose to 4 mg BID, repeat level daily through 8/14 (ordered).  -  mg BID (increased 8/7, prior decrease for GI symptoms/leukopenia)   - Prednisone 12.5 mg qAM / 10 mg qPM, next taper due 8/18 (not yet ordered)  Date AM dose (mg) PM  dose (mg)   8/4/22 12.5 10   8/18/22 10 10   9/15/22 10 7.5   10/13/22 7.5 7.5   11/10/22 7.5 5   12/8/22 5 5   1/5/23 5 2.5      Prophylaxis:   - Dapsone qMWF for PJP ppx (7/18)  - VGCV for CMV ppx, CMV negative 8/8  - Nystatin for oral candidiasis ppx, 6 month course  - See below for serologies and viral ppx:    Donor Recipient Intervention   CMV status Positive Positive Valganciclovir POD #8-90   EBV status Positive Positive EBV monitoring as below   HSV status N/A Positive Not indicated      ID: Donor bronch cultures (OSH) with Strep beta hemolytic (not group A).     H/o M. peregrinum colonization: NGTD post-transplant.  - AFB to be sent on all future bronchs     Streptococcus pneumoniae:  Stenotrophomonas maltophilia: Noted in recipient cultures at time of transplant.  S/p ceftazidime 6/28-7/10, vancomycin 7/7-7/8 and 6/28-6/30, and levofloxacin 7/10-7/12 for total 2 week ABX course.     Hypogammaglobulinemia: IgG adequate at time of transplant, repeat level low (336) on 7/28.  S/p IVIG dosing with premedication 7/30, tolerated well.  IgG on 8/10 low but stable at 590, replacement not indicated.  - Repeat IgG 8/30 (not yet ordered)     H/o hepatitis C:  Diagnosed in 1980s s/p 2m treatment, quant negative since 10/2017, last positive 2/20/17 ([phone]).  Ab positive on 6/2021 with negative HCV PCR.  H/o remote EtOH abuse.  MR elastography (4/27/21) with hepatology review and consult without any concerns post transplant.  Hepatitis C RNA negative and hepatitis C antibody positive (old) on 6/28.  Repeat hepatitis C RNA negative 8/8 in setting of elevated LFTs as below.     EBV viremia: EBV level 11k, log 4.1 on 7/28.  Not likely to be clinically significant.  Repeat EBV (in the setting of elevated LFTs) decreased to 1,501, log 3.2 (8/8).  - EBV monthly monitoring (9/8, not yet ordered)     Other relevant problems managed by primary team:      SVT:   Aflutter with RVR: SVT first noted on 7/14, prior to HD line  placement.  Continues intermittently.  Aflutter with RVR to 200 7/17 triggered by activity.  EP consulted 7/17 given persistent tachycardia/dysrhythmia.  Midodrine held 7/19 and since discontinued.  AC and metoprolol per primary team.     Left hand ischemia: Radial artery thrombosis identified on duplex doppler.  S/p thrombectomy on 7/2.  AC per primary team as above.      BACILIO:   Hyperkalemia: Likely multifactorial including medications (Bactrim, tacrolimus) and hypotension.  Fludrocortisone 7/6-7/11.  Potassium had been stable, now elevated to 5.5-5.7 since 8/8.  S/p non-tunneled HD line 7/14.  Initial iHD run 7/14 limited by afib with RVR vs SVT.  Last iHD run 7/16, line removed 7/22.  Creatinine increased since 7/29 with Diamox and elevated tacrolimus level.  Transitioned to low potassium TF formula 8/8 although intermittently held.  - Tacrolimus monitoring as above  - Florinef (8/9) --> increase  - Volume management per primary team     Elevated LFTs:   Extrahepatic mass: Acute rise in LFTs (alk phos 861, , AST 1,143 and bilirubin 0.9) on 8/8 (prior normal).  Also in setting of increased and different abdominal pain as below.  Amylase low, lipase normal.  Abdominal US 8/8 with extrahepatic mass at level of common bile duct with associated intrahepatic and common bile duct dilation, patent hepatic vasculature, and layering gallbladder sludge.  Concern for infection vs malignancy vs fluid collection.  MRCP (8/9) with slight increase in moderate biliary dilation and gall bladder with moderate protein/hemorrhagic material.  Also noted to have cystic foci in the pancreatic head (most consistent with IPMN).  - LFTs daily  - ERCP 8/11 (with EGD as below)  - Will need repeat abdominal CT in 6 months for pancreatic findings     Severe gastroparesis:   Pyloric ulcer: Cramping abdominal pain 7/15.  AXR with large stool burden in colon, no obstruction or distention.  Some nausea but no emesis.  CT abdomen (7/15) with  moderate to large gastric distention, otherwise without obstruction.  NG placed for LIS with moderate to large output for several days.  Increased abdominal pain 7/17 after clamping for 4 hours.  Gastric emptying study (7/20) with severe gastric emptying delay (95% retention at 4 hours).  S/p GJ tube placement in IR 7/27.  S/p EGD 8/3 for coffee ground G tube output, noted to have pyloric ulcer and excessive gastric fluid and residual food.  Increased abdominal pain/cramping with trial of cycled TF 8/7 to 8/8.  AXR 8/8 without evidence of peritoneal free air or abnormally dilated loops of bowel.  Hemoglobin drop with dark tarry stools 8/8.  - PPI BID  - Simethicone prn  - TF via J tube, management per RD and primary team  - G tube to gravity drainage   - Strict NPO except for ice chips and sips  - EGD 8/11 with GI     C diff infection: Abdominal pain with diarrhea noted 7/8, AXR without dilated bowel, moderate colonic stool burden.  C diff positive 7/11.  Loose stools (on tube feeds) stable.  S/p PO vancomycin (7/11-7/28).  Repeat C diff negative 8/6.  - Low threshold to resume vancomycin in the future with ABX course     Hypomagnesemia: Suppressed absorption d/t CNI.   - Continue daily magnesium with replacement protocol prn, schedule oral magnesium supplementation if needed prior to discharge    We appreciate the excellent care provided by the [place] [id number] team.  Recommendations communicated via in person rounding and this note.  Will continue to follow along closely, please do not hesitate to call with any questions or concerns.    Patient discussed with Dr. Castillo.    RABIA Garcia-DANIEL  Inpatient EMILY  Pulmonary CF/Transplant     Subjective & Interval History:     Predominantly on 1L NC, SpO2 mid-high 80s on RA although quickly recovers once placed back on oxygen.  Denies dyspnea on RA.  Cough no longer productive, denies chest congestion.  Feeling slightly more weak over the past couple days.  Abdominal  "pain persists although better this morning.  Hemoglobin 7.9, stools more brown yesterday.  TF on hold for possible procedures today.    Review of Systems:     C: No fever, no chills, + increased weight  INTEGUMENTARY/SKIN: No rash or obvious new lesions  ENT/MOUTH: No sore throat, no sinus pain, no nasal congestion or drainage  RESP: See interval history  CV: No chest pain, no palpitations, no peripheral edema, no orthopnea  GI: No nausea, no vomiting, no reflux symptoms  : No dysuria  MUSCULOSKELETAL: No myalgias, no arthralgias  ENDOCRINE: + blood sugars intermittently elevated  NEURO: No headache, no numbness or tingling  PSYCHIATRIC: Mood stable    Physical Exam:     All notes, images, and labs from past 24 hours (at minimum) were reviewed.    Vital signs:  Temp: 98.5  F (36.9  C) Temp src: Oral BP: 135/79 Pulse: 103   Resp: 14 SpO2: 99 % O2 Device: Nasal cannula Oxygen Delivery: 1 LPM Height: 157.5 cm (5' 2\") Weight: 67.9 kg (149 lb 9.6 oz)  I/O:     Intake/Output Summary (Last 24 hours) at 8/11/2022 0803  Last data filed at 8/11/2022 0600  Gross per 24 hour   Intake 810 ml   Output 1575 ml   Net -765 ml     Constitutional: Lying in bed, in no apparent distress.   HEENT: Eyes with pink conjunctivae, anicteric.  Oral mucosa moist without lesions.    PULM: Mildly diminished air flow to bases bilaterally.  Faint bibasilar crackles.  No rhonchi, no wheezes.  Non-labored breathing on 1L NC.  CV: Normal S1 and S2.  RRR.  No murmur, gallop, or rub.  No peripheral edema.   ABD: NABS, soft, + tender t/o, nondistended.  PEG/J tube site not visualized.  MSK: Moves all extremities.  No apparent muscle wasting.   NEURO: Alert, conversant.   SKIN: Warm, dry.  No rash on limited exam.  Clamshell incision not visualized.  PSYCH: Mood stable.     Lines, Drains, and Devices:  Midline Catheter Double Lumen (Active)   Site Assessment WDL 08/11/22 0400   External Cath Length (cm) 2 cm 08/04/22 1244   Initial Extremity " Circumference (cm) 29 cm 07/28/22 1455   Dressing Intervention Chlorhexidine patch 08/10/22 0800   Line Necessity Yes, meets criteria 08/11/22 0400   Dressing Change Due 08/11/22 08/11/22 0400   Purple - Status saline locked 08/11/22 0400   Purple - Cap Change Due 08/12/22 08/11/22 0400   Red - Status infusing 08/11/22 0400   Red - Cap Change Due 08/12/22 08/11/22 0400   Extravasation? No 08/09/22 0000   Number of days: 14     Data:     LABS    CMP:   Recent Labs   Lab 08/11/22  0747 08/11/22  0610 08/11/22  0402 08/10/22  2314 08/10/22  2016 08/10/22  1045 08/10/22  0948 08/10/22  0650 08/10/22  0453 08/09/22  0742 08/09/22  0548 08/08/22  0916 08/08/22  0740 08/07/22  1121 08/07/22  0842 08/04/22  1202 08/04/22  0902   NA  --  138  --   --   --   --   --   --  138  --  136  --  142  --  140   < > 140   POTASSIUM  --  5.4*  --   --  5.1  --  4.8  4.8  --  5.7*   < > 5.5*  5.5*   < > 5.5*  --  4.7   < > 4.9   CHLORIDE  --  97*  --   --   --   --   --   --  97*  --  95*  --  99  --  96*   < > 96*   CO2  --  37*  --   --   --   --   --   --  38*  --  39*  --  41*  --  41*   < > 41*   ANIONGAP  --  4*  --   --   --   --   --   --  3*  --  2*  --  2*  --  3*   < > 3*   * 124* 124* 123*  --    < >  --    < > 111*   < > 219*   < > 116*   < > 160*   < > 122*   BUN  --  49.5*  --   --   --   --   --   --  46.0*  --  55.6*  --  63.9*  --  56.9*   < > 60.3*   CR  --  1.50*  --   --   --   --   --   --  1.36*  --  1.41*  --  1.23*  --  1.13*   < > 1.20*   GFRESTIMATED  --  39*  --   --   --   --   --   --  44*  --  42*  --  50*  --  55*   < > 52*   ESTUARDO  --  8.9  --   --   --   --   --   --  9.0  --  8.9  --  8.9  --  9.1   < > 9.4   MAG  --   --   --   --   --   --   --   --  2.7*  --  2.4*  --  2.5*  --  2.5*   < > 2.4*   PHOS  --   --   --   --   --   --   --   --  4.1  --  3.8  --  3.2  --  3.0   < > 3.1   PROTTOTAL  --  4.9*  --   --   --   --   --   --   --   --  4.8*  --  5.0*  --   --   --  5.4*   ALBUMIN  --   2.4*  --   --   --   --   --   --   --   --  2.5*  --  2.5*  --   --   --  2.8*   BILITOTAL  --  0.2  --   --   --   --   --   --   --   --  0.3  --  0.9  --   --   --  0.2   ALKPHOS  --  412*  --   --   --   --   --   --   --   --  644*  --  861*  --   --   --  60   AST  --  19  --   --   --   --   --   --   --   --  235*  --  1,143*  --   --   --  22   ALT  --  215*  --   --   --   --   --   --   --   --  601*  --  791*  --   --   --  12    < > = values in this interval not displayed.     CBC:   Recent Labs   Lab 08/11/22  0610 08/10/22  0453 08/09/22  2213 08/09/22  0548 08/08/22  1630 08/08/22  0740   WBC 7.8 8.3  --  6.3  --  4.6   RBC 2.61* 2.78*  --  2.17*  --  2.08*   HGB 7.9* 8.4* 8.5* 6.5*   < > 6.5*   HCT 25.3* 27.2*  --  21.7*  --  21.0*   MCV 97 98  --  100  --  101*   MCH 30.3 30.2  --  30.0  --  31.3   MCHC 31.2* 30.9*  --  30.0*  --  31.0*   RDW 16.8* 17.4*  --  17.8*  --  18.6*    287  --  239  --  242    < > = values in this interval not displayed.       INR:   Recent Labs   Lab 08/09/22  0548 08/08/22  1630   INR 0.95 1.07       Glucose:   Recent Labs   Lab 08/11/22  0747 08/11/22  0610 08/11/22  0402 08/10/22  2314 08/10/22  1945 08/10/22  1750   * 124* 124* 123* 165* 213*       Blood Gas:   Recent Labs   Lab 08/04/22  0902   PHV 7.42   PCO2V 71*   PO2V 31   HCO3V 46*   LINDY 18.7*   O2PER 2       Culture Data No results for input(s): CULT in the last 168 hours.    Virology Data:   Lab Results   Component Value Date    FLUAH1 Not Detected 06/29/2022    FLUAH3 Not Detected 06/29/2022    DI9877 Not Detected 06/29/2022    IFLUB Not Detected 06/29/2022    RSVA Not Detected 06/29/2022    RSVB Not Detected 06/29/2022    PIV1 Not Detected 06/29/2022    PIV2 Not Detected 06/29/2022    PIV3 Not Detected 06/29/2022    HMPV Not Detected 06/29/2022       Historical CMV results (last 3 of prior testing):  Lab Results   Component Value Date    CMVQNT Not Detected 08/08/2022    CMVQNT Not Detected  07/25/2022     No results found for: CMVLOG    Urine Studies    Recent Labs   Lab Test 08/01/22  0319 07/08/22  0831 07/05/22  1004   URINEPH 8.0* 5.5 5.5   NITRITE Negative Negative Negative   LEUKEST Negative Negative Negative   WBCU  --  1 5       Most Recent [place] Pulmonary Function Testing (FVC/FEV1 only)  FVC-Pre   Date Value Ref Range Status   04/29/2022 1.82 L    11/11/2021 2.17 L    06/14/2021 2.00 L      FVC-%Pred-Pre   Date Value Ref Range Status   04/29/2022 58 %    11/11/2021 70 %    06/14/2021 64 %      FEV1-Pre   Date Value Ref Range Status   04/29/2022 0.51 L    11/11/2021 0.53 L    06/14/2021 0.54 L      FEV1-%Pred-Pre   Date Value Ref Range Status   04/29/2022 20 %    11/11/2021 21 %    06/14/2021 21 %        IMAGING    Recent Results (from the past 48 hour(s))   XR Chest 2 Views    Narrative    Chest 2 views    Indication interval follow-up, lung transplant comment bilateral chest  tubes    COMPARISON: Yesterday    FINDINGS: Clamshell sternotomy from prior bilateral lung transplant  again noted. Calcification of the aortic knob. Heart size normal.  Bilateral pleural effusions unchanged with possible loculated  component especially on the left. No ectopic air collections in the  interim. Perihilar streaky opacities unchanged.      Impression    IMPRESSION: Postoperative bilateral lung transplant again noted with  continued pleural effusions and perihilar streaky prominence which  could indicate atelectasis or edema. No acute interval changes.    ALVINA HARRY MD         SYSTEM ID:  [id number]   MR Abdomen MRCP w/o & w Contrast    Narrative    Exam: MR ABDOMEN MRCP W/O & W CONTRAST, 8/10/2022 7:59 AM    Indication: Extra-hepatic mass seen on US    Comparison: 8/8/2022, 7/15/2010    Technique: Images were acquired with and without intravenous  gadolinium contrast through the upper abdomen. The following MR images  were acquired without intravenous contrast: TrueFISP, multiplanar  T2-weighted,  axial T1 in/out of phase, T2-weighted MRCP images, axial  diffusion-weighted and axial apparent diffusion coefficient.  T1-weighted images were obtained before contrast at the multiple time  points following contrast injection. 3-D reformatted images were  generated by the technologist. Contrast dose: 7.5mL Gadavist    FINDINGS:    Liver: Normal    Gallbladder/Bile Ducts: Slightly increased mild intrahepatic and  moderate extrahepatic biliary dilation with common hepatic duct  measuring up to 21 mm (series 4, image 9) and the common bile duct  measuring up to 11 mm with level of transition at the gallbladder neck  which bulges medially into the duct. The gallbladder is hydropic and  contains moderate amount of T1 hyperintense material, which extends  into the bladder neck with folding of the gallbladder wall at the  transition between the neck and body. No filling defect within the  intra or extrahepatic biliary tree.    Spleen: Normal    Kidneys: Subcentimeter right renal cortical cysts.    Adrenal glands: Normal    Pancreas: No atrophy. Nondilated pancreatic duct.  Cystic foci in the pancreatic head measuring 4 x 3 mm superiorly and 4  x 3 mm inferiorly (series 15, image 48 and 50 respectively).    Bowel: Nondilated.  Percutaneous gastrojejunostomy tube with tip in the proximal jejunum  in the right mid abdomen.    Lymph nodes: No adenopathy    Blood vessels: Patent portal, splenic and superior mesenteric veins  without thrombus, Conventional hepatic arterial anatomy    Lung bases: Loculated bilateral pleural effusions. Basilar opacities.    Bones and soft tissues: No acute osseous abnormality    Mesentery and abdominal wall: No hernia    Ascites: Small volume      Impression    IMPRESSION:   1.  Slightly increased moderate extrahepatic and mild intrahepatic  biliary dilation with the common hepatic duct measuring up to 2.1 cm  and the common bile duct measuring up to 1.1 cm in diameter with  transition in the  upper common bile duct at the level of the hydropic  gallbladder, which bulges medially. The gallbladder contains a  moderate amount of proteinaceous/hemorrhagic material, which extends  into the gallbladder neck, corresponding to the findings on comparison  ultrasound. Consider HIDA scan as clinically warranted to assess for  acute cholecystitis. There is no solid mass as clinically queried.  2.  Loculated bilateral pleural effusions.  3.  Small volume ascites.  4.  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 Recommend follow-up as described below.    [facility] recommendations for asymptomatic pancreatic  cysts, modified from international consensus guidelines*   Size of largest cyst:   Less than 1 cm: Follow-up imaging in 6 months to 1 year, then lengthen  interval to 2-3 years if no change.  1-2 cm: Follow-up imaging at 6 months, then yearly for 2 years, then  lengthen interval if no change.   The optimal initial study or first follow-up exam is MRI/MRCP  performed with intravenous gadolinium contrast to allow full cyst  characterization. Once characterized, contrast is optional on  follow-up MRIs. If MRI is contraindicated, CT with contrast is  recommended.   GI consultation for possible endoscopic ultrasound is recommended for  cysts with the following features: Size > 2 cm, >20% growth on  followup, wall thickening or enhancement, mural nodule, duct > 5 mm,  or abrupt change in duct caliber with distal atrophy. GI or surgical  consultation is also recommended for symptomatic cysts.   *Reference: International Consensus Guidelines for Management of IPMN  and MCN of the pancreas. Pancreatology: 12:(2012); 183-197.    I have personally reviewed the examination and initial interpretation  and I agree with the findings.    CECI REGAN,          SYSTEM ID:  [id number]

## 2022-08-11 NOTE — ANESTHESIA CARE TRANSFER NOTE
Patient: Sofie Rodriguez    Procedure: Procedure(s):  ENDOSCOPIC RETROGRADE CHOLANGIOPANCREATOGRAPHY WITH PANCREATIC DUCT NEEDLE KNIFE AND STENT PLACEMENT, BILE DUCT SPHINCTEROTOMY, BLOOD/DEBRIS REMOVAL AND STENT PLACEMENT  SMALL BOWEL ENTEROSCOPY       Diagnosis: Gastrointestinal hemorrhage associated with gastric ulcer [K25.4]  Biliary obstruction [K83.1]  Diagnosis Additional Information: No value filed.    Anesthesia Type:   General     Note:    Oropharynx: oropharynx clear of all foreign objects and spontaneously breathing  Level of Consciousness: awake  Oxygen Supplementation: face mask  Level of Supplemental Oxygen (L/min / FiO2): 6  Independent Airway: airway patency satisfactory and stable  Dentition: dentition unchanged  Vital Signs Stable: post-procedure vital signs reviewed and stable  Report to RN Given: handoff report given  Patient transferred to: PACU    Handoff Report: Identifed the Patient, Identified the Reponsible Provider, Reviewed the pertinent medical history, Discussed the surgical course, Reviewed Intra-OP anesthesia mangement and issues during anesthesia, Set expectations for post-procedure period and Allowed opportunity for questions and acknowledgement of understanding      Vitals:  Vitals Value Taken Time   BP 97/75 08/11/22 1645   Temp     Pulse 85 08/11/22 1649   Resp 17 08/11/22 1649   SpO2 100 % 08/11/22 1649   Vitals shown include unvalidated device data.    Electronically Signed By: RUFUS Buenrostro CRNA  August 11, 2022  4:51 PM

## 2022-08-11 NOTE — PLAN OF CARE
Shift Summary: 8/11/22     Neuro: A&Ox4. Stand by assist in the room. Minimal assistance with repositioning.      Cardiac: NSR on tele.      Resp: 1L nasal cannula through the night. Denies feeling short of breath. Diminished, clear lung sounds.     GI/: Last bowel movement 8/10/22. Voiding in bedside commode with 1 assist to transfer with adequate UO. NPO with TF's on hold.      Skin: Redness to bottom and vinayak area. Old CT sites. No new deficits.      PRN: Oxycodone every 4 hours for pain relief. At times complains of abdominal discomfort but subsides with Oxy.     Plan: EGD and ERCP this shift pending AM potassium level being less than 5.     Goal Outcome Evaluation:    Plan of Care Reviewed With: patient     Overall Patient Progress: improving    David Navarro RN on 8/11/2022 at 4:20 AM

## 2022-08-11 NOTE — BRIEF OP NOTE
[facility]    Brief Operative Note  Sofie Rodriguez is a 62 year old female with a PMHx of HFpEF, and end stage COPD s/p bilateral lung transplant (6/28/22) complicated by acute limb ischemia of LUE requiring left radial thrombectomy, EBV viremia and C.diff (vancomycin 7/12/22-7/28/22). She was found to have delayed gastric emptying on GES and underwent percutaneous GJ tube placement by IR on 7/27/22 and recent GI bleed suspected to due to clean based ulcer (Anant III) in the antrum noted in recent EGD. She started having melena with an associated hemoglobin drop to 6.5 (baseline 7-8). She is also noted to have an abdominal pain with an associated elevated LFTs. MRCP shows mild dilation of intrahepatic and extrahepatic biliary ducts and gallbladder with sludge extending to the bladder neck. Normal TB (0.2). Elevated alk phos (412) and ALT (215). She presents for an EGD for evaluation of acute blood loss anemia in the setting of PUDs and an ERCP for evaluation of possible      Hemoglobin 7.9, platelets 322, INR 0.95, Cr 1.50.      Pre-operative diagnosis: Gastrointestinal hemorrhage associated with gastric ulcer [K25.4]  Biliary obstruction [K83.1]  Post-operative diagnosis Same as pre-operative diagnosis    Procedure: Procedure(s):  ENDOSCOPIC RETROGRADE CHOLANGIOPANCREATOGRAPHY WITH PANCREATIC DUCT NEEDLE KNIFE AND STENT PLACEMENT, BILE DUCT SPHINCTEROTOMY, BLOOD/DEBRIS REMOVAL AND STENT PLACEMENT  SMALL BOWEL ENTEROSCOPY  Surgeon: Surgeon(s) and Role:     * Cosmo Arroyo MD - Primary  Anesthesia: General   Estimated Blood Loss: 0 mL from 8/11/2022  2:03 PM to 8/11/2022  4:36 PM      Drains: None  Specimens: * No specimens in log *  Findings:     - Patient underwent an EGD prior to the ERCP given recent history of acute blood loss anemia. PEG-J was seen in the expected location of the stomach going through the pyloric channel into the duodenum. Mild  erythema secondary to PEG-J trauma seen near the PEG-J insertion site but no active bleeding. The PEG-J was pulled out partially from the small bowel and pyloric channel into the gastric body to facilitate scope passage into the duodenum    - Hematin mixed with bile is seen in the second and third portion of the duodenum but no definite source of active bleeding   - A pediatric colonoscope was inserted and advanced to the proximal jejunum and showed hematin mixed with bile in the second and third portion of the duodenum but no source of active bleeding (AVMs vs Dieulafoy lesions vs ulceration). Finding was concerning for hemobilia   - The side viewing duodenoscope was inserted. Difficulty to access the common bile duct an in anticipation of needle knife sphincterotomy the PD was accessed and a 4 Fr x 11 cm Arroyo pancreatic flex stent was placed   - A needle knife sphincterotomy was then performed which facilitated common bile duct cannulation. No bleeding from the sphincterotomy site, however, significant amount of blood mixed with bile came pouring from the common bile duct. This is believed to be the patient's source of acute blood loss anemia  - A 10 Fr x 6 cm was placed into the common bile duct     Complications: None.  Implants:   Implant Name Type Inv. Item Serial No.  Lot No. LRB No. Used Action   STENT ARROYO PANCREA FLEX [id number] W/O FLANGE SGL PIGTAIL - [id number] Stent STENT ARROYO PANCREA FLEX [id number] W/O FLANGE SGL PIGTAIL  [place] [id number] N/A 1 Implanted   STENT ENDOPROS BILIARY GORE VIABIL W/HL [id number] - [id number] Stent STENT ENDOPROS BILIARY GORE VIABIL W/HL [id number]  N/A 1 Implanted       Recommendation  - [facility] patient to the [facility] for [facility] care  - Obtain a CTA tonight to rule out ongoing hemobilia and if evidence of bleeding, notify GI call team and consult IR   - Admit patient to a higher acuity floor  - Monitor H/H  overnight and transfuse as appropriate   - I called patient's son (Gera Rodriguez), however, went to voicemail   - Discussed recommendations with Dr. Gama who is covering the patient

## 2022-08-11 NOTE — PLAN OF CARE
Shift Highlights:   Pt alert & oriented, able to make needs known. VSS, remains on 1 lpm O2 to maintain sats >90%. Tolerates room air for short periods only, dyspneic with exertion.     Up to commode to void, reports 1-2 BM this shift.     Dextrose bolus completed, K+ 4.8 at recheck.     GI procedure deferred until tomorrow, tube feeds restarted at 1300. Oncoming RN to stop at midnight.      Problem: Fluid and Electrolyte Imbalance (Lung Surgery)  Goal: Fluid and Electrolyte Balance  Intervention: Monitor and Manage Fluid and Electrolyte Balance  Flowsheets (Taken 8/10/2022 1913)  Fluid/Electrolyte Management: (potassium shifted to meet goal < 5.0) other (see comments)

## 2022-08-11 NOTE — ANESTHESIA POSTPROCEDURE EVALUATION
Patient: Sofie Rodriguez    Procedure: Procedure(s):  ENDOSCOPIC RETROGRADE CHOLANGIOPANCREATOGRAPHY WITH PANCREATIC DUCT NEEDLE KNIFE AND STENT PLACEMENT, BILE DUCT SPHINCTEROTOMY, BLOOD/DEBRIS REMOVAL AND STENT PLACEMENT  SMALL BOWEL ENTEROSCOPY       Anesthesia Type:  General    Note:  Disposition: Inpatient   Postop Pain Control: Uneventful            Sign Out: Well controlled pain   PONV: No   Neuro/Psych: Uneventful            Sign Out: Acceptable/Baseline neuro status   Airway/Respiratory: Uneventful            Sign Out: Acceptable/Baseline resp. status   CV/Hemodynamics: Uneventful            Sign Out: Acceptable CV status; No obvious hypovolemia; No obvious fluid overload   Other NRE: NONE   DID A NON-ROUTINE EVENT OCCUR? No           Last vitals:  Vitals Value Taken Time   /71 08/11/22 1730   Temp 36.7  C (98  F) 08/11/22 1640   Pulse 87 08/11/22 1742   Resp 19 08/11/22 1742   SpO2 100 % 08/11/22 1742   Vitals shown include unvalidated device data.    Electronically Signed By: Nathen Bob MD  August 11, 2022  5:44 PM

## 2022-08-11 NOTE — PROGRESS NOTES
# Hemobilia  - Discussed with GI fellow.  Recommends CTA a/p to look for source of bleeding.  Will order. If active bleeding  - Trend Hb Q8H  - Hold heparin and TFs until CTA is done and Hb is stable.

## 2022-08-12 ENCOUNTER — APPOINTMENT (OUTPATIENT)
Dept: GENERAL RADIOLOGY | Facility: CLINIC | Age: 62
DRG: 007 | End: 2022-08-12
Attending: INTERNAL MEDICINE
Payer: MEDICARE

## 2022-08-12 LAB
ALBUMIN SERPL BCG-MCNC: 2.6 G/DL (ref 3.5–5.2)
ALP SERPL-CCNC: 344 U/L (ref 35–104)
ALT SERPL W P-5'-P-CCNC: 147 U/L (ref 10–35)
ANION GAP SERPL CALCULATED.3IONS-SCNC: 5 MMOL/L (ref 7–15)
APPEARANCE FLD: ABNORMAL
APTT PPP: 25 SECONDS (ref 22–38)
AST SERPL W P-5'-P-CCNC: 15 U/L (ref 10–35)
BILIRUB SERPL-MCNC: 0.2 MG/DL
BUN SERPL-MCNC: 42.9 MG/DL (ref 8–23)
CALCIUM SERPL-MCNC: 8.7 MG/DL (ref 8.8–10.2)
CEA SERPL-MCNC: 1.9 NG/ML
CELL COUNT BODY FLUID SOURCE: ABNORMAL
CHLORIDE SERPL-SCNC: 99 MMOL/L (ref 98–107)
COLOR FLD: YELLOW
CREAT SERPL-MCNC: 1.42 MG/DL (ref 0.51–0.95)
DEPRECATED HCO3 PLAS-SCNC: 35 MMOL/L (ref 22–29)
DONOR IDENTIFICATION: NORMAL
DQB2: 2155
DSA COMMENTS: NORMAL
DSA PRESENT: YES
DSA TEST METHOD: NORMAL
ERYTHROCYTE [DISTWIDTH] IN BLOOD BY AUTOMATED COUNT: 16.6 % (ref 10–15)
GFR SERPL CREATININE-BSD FRML MDRD: 42 ML/MIN/1.73M2
GLUCOSE BLDC GLUCOMTR-MCNC: 116 MG/DL (ref 70–99)
GLUCOSE BLDC GLUCOMTR-MCNC: 129 MG/DL (ref 70–99)
GLUCOSE BLDC GLUCOMTR-MCNC: 132 MG/DL (ref 70–99)
GLUCOSE BLDC GLUCOMTR-MCNC: 135 MG/DL (ref 70–99)
GLUCOSE BLDC GLUCOMTR-MCNC: 144 MG/DL (ref 70–99)
GLUCOSE BLDC GLUCOMTR-MCNC: 174 MG/DL (ref 70–99)
GLUCOSE BODY FLUID SOURCE: NORMAL
GLUCOSE FLD-MCNC: 150 MG/DL
GLUCOSE SERPL-MCNC: 133 MG/DL (ref 70–99)
GRAM STAIN RESULT: NORMAL
GRAM STAIN RESULT: NORMAL
HCT VFR BLD AUTO: 29.5 % (ref 35–47)
HGB BLD-MCNC: 8.1 G/DL (ref 11.7–15.7)
HGB BLD-MCNC: 9.1 G/DL (ref 11.7–15.7)
HOLD SPECIMEN: NORMAL
INR PPP: 0.93 (ref 0.85–1.15)
LD BODY BODY FLUID SOURCE: NORMAL
LDH FLD L TO P-CCNC: 121 U/L
LDH SERPL L TO P-CCNC: 958 U/L (ref 0–250)
LYMPHOCYTES NFR FLD MANUAL: 43 %
MAGNESIUM SERPL-MCNC: 2.1 MG/DL (ref 1.7–2.3)
MCH RBC QN AUTO: 30.5 PG (ref 26.5–33)
MCHC RBC AUTO-ENTMCNC: 30.8 G/DL (ref 31.5–36.5)
MCV RBC AUTO: 99 FL (ref 78–100)
MONOS+MACROS NFR FLD MANUAL: 18 %
NEUTS BAND NFR FLD MANUAL: 39 %
ORGAN: NORMAL
PHOSPHATE SERPL-MCNC: 3.9 MG/DL (ref 2.5–4.5)
PLATELET # BLD AUTO: 344 10E3/UL (ref 150–450)
POTASSIUM SERPL-SCNC: 5.2 MMOL/L (ref 3.4–5.3)
PROT FLD-MCNC: 2.1 G/DL
PROT SERPL-MCNC: 5 G/DL (ref 6.4–8.3)
PROT SERPL-MCNC: 5.6 G/DL (ref 6.4–8.3)
PROTEIN BODY FLUID SOURCE: NORMAL
RBC # BLD AUTO: 2.98 10E6/UL (ref 3.8–5.2)
SA 1 CELL: NORMAL
SA 1 TEST METHOD: NORMAL
SA 2 CELL: NORMAL
SA 2 TEST METHOD: NORMAL
SA1 HI RISK ABY: NORMAL
SA1 MOD RISK ABY: NORMAL
SA2 HI RISK ABY: NORMAL
SA2 MOD RISK ABY: NORMAL
SODIUM SERPL-SCNC: 139 MMOL/L (ref 136–145)
TACROLIMUS BLD-MCNC: 8.2 UG/L (ref 5–15)
TME LAST DOSE: NORMAL H
TME LAST DOSE: NORMAL H
TRIGL FLD-MCNC: 19 MG/DL
TRIGLYCERIDE BODY FLUID SOURCE: NORMAL
UNACCEPTABLE ANTIGENS: NORMAL
UNOS CPRA: 37
WBC # BLD AUTO: 7.4 10E3/UL (ref 4–11)
WBC # FLD AUTO: 228 /UL
ZZZSA 1  COMMENTS: NORMAL
ZZZSA 2 COMMENTS: NORMAL

## 2022-08-12 PROCEDURE — 32555 ASPIRATE PLEURA W/ IMAGING: CPT | Performed by: INTERNAL MEDICINE

## 2022-08-12 PROCEDURE — 36592 COLLECT BLOOD FROM PICC: CPT | Performed by: PHYSICIAN ASSISTANT

## 2022-08-12 PROCEDURE — 99233 SBSQ HOSP IP/OBS HIGH 50: CPT | Mod: 24 | Performed by: PHYSICIAN ASSISTANT

## 2022-08-12 PROCEDURE — 88305 TISSUE EXAM BY PATHOLOGIST: CPT | Mod: TC

## 2022-08-12 PROCEDURE — 250N000011 HC RX IP 250 OP 636: Performed by: STUDENT IN AN ORGANIZED HEALTH CARE EDUCATION/TRAINING PROGRAM

## 2022-08-12 PROCEDURE — 80197 ASSAY OF TACROLIMUS: CPT | Performed by: PHYSICIAN ASSISTANT

## 2022-08-12 PROCEDURE — 71045 X-RAY EXAM CHEST 1 VIEW: CPT | Mod: 26 | Performed by: RADIOLOGY

## 2022-08-12 PROCEDURE — 94640 AIRWAY INHALATION TREATMENT: CPT

## 2022-08-12 PROCEDURE — 250N000013 HC RX MED GY IP 250 OP 250 PS 637: Performed by: SURGERY

## 2022-08-12 PROCEDURE — 82378 CARCINOEMBRYONIC ANTIGEN: CPT

## 2022-08-12 PROCEDURE — 85610 PROTHROMBIN TIME: CPT

## 2022-08-12 PROCEDURE — 250N000011 HC RX IP 250 OP 636

## 2022-08-12 PROCEDURE — 0W993ZZ DRAINAGE OF RIGHT PLEURAL CAVITY, PERCUTANEOUS APPROACH: ICD-10-PCS | Performed by: INTERNAL MEDICINE

## 2022-08-12 PROCEDURE — 89051 BODY FLUID CELL COUNT: CPT

## 2022-08-12 PROCEDURE — 86301 IMMUNOASSAY TUMOR CA 19-9: CPT

## 2022-08-12 PROCEDURE — 82945 GLUCOSE OTHER FLUID: CPT

## 2022-08-12 PROCEDURE — 80053 COMPREHEN METABOLIC PANEL: CPT

## 2022-08-12 PROCEDURE — 250N000013 HC RX MED GY IP 250 OP 250 PS 637: Performed by: STUDENT IN AN ORGANIZED HEALTH CARE EDUCATION/TRAINING PROGRAM

## 2022-08-12 PROCEDURE — 84478 ASSAY OF TRIGLYCERIDES: CPT

## 2022-08-12 PROCEDURE — 87205 SMEAR GRAM STAIN: CPT

## 2022-08-12 PROCEDURE — 83615 LACTATE (LD) (LDH) ENZYME: CPT

## 2022-08-12 PROCEDURE — 87116 MYCOBACTERIA CULTURE: CPT

## 2022-08-12 PROCEDURE — 85730 THROMBOPLASTIN TIME PARTIAL: CPT

## 2022-08-12 PROCEDURE — 250N000012 HC RX MED GY IP 250 OP 636 PS 637: Performed by: INTERNAL MEDICINE

## 2022-08-12 PROCEDURE — 88112 CYTOPATH CELL ENHANCE TECH: CPT | Mod: TC

## 2022-08-12 PROCEDURE — 250N000011 HC RX IP 250 OP 636: Performed by: HOSPITALIST

## 2022-08-12 PROCEDURE — 87070 CULTURE OTHR SPECIMN AEROBIC: CPT

## 2022-08-12 PROCEDURE — 250N000013 HC RX MED GY IP 250 OP 250 PS 637: Performed by: HOSPITALIST

## 2022-08-12 PROCEDURE — 250N000009 HC RX 250: Performed by: NURSE PRACTITIONER

## 2022-08-12 PROCEDURE — C9113 INJ PANTOPRAZOLE SODIUM, VIA: HCPCS | Performed by: STUDENT IN AN ORGANIZED HEALTH CARE EDUCATION/TRAINING PROGRAM

## 2022-08-12 PROCEDURE — 36592 COLLECT BLOOD FROM PICC: CPT

## 2022-08-12 PROCEDURE — 85027 COMPLETE CBC AUTOMATED: CPT | Performed by: STUDENT IN AN ORGANIZED HEALTH CARE EDUCATION/TRAINING PROGRAM

## 2022-08-12 PROCEDURE — 250N000009 HC RX 250

## 2022-08-12 PROCEDURE — 36415 COLL VENOUS BLD VENIPUNCTURE: CPT | Performed by: STUDENT IN AN ORGANIZED HEALTH CARE EDUCATION/TRAINING PROGRAM

## 2022-08-12 PROCEDURE — 84155 ASSAY OF PROTEIN SERUM: CPT

## 2022-08-12 PROCEDURE — 999N000065 XR CHEST PORT 1 VIEW

## 2022-08-12 PROCEDURE — 87102 FUNGUS ISOLATION CULTURE: CPT

## 2022-08-12 PROCEDURE — 87206 SMEAR FLUORESCENT/ACID STAI: CPT

## 2022-08-12 PROCEDURE — 99222 1ST HOSP IP/OBS MODERATE 55: CPT | Mod: 24 | Performed by: INTERNAL MEDICINE

## 2022-08-12 PROCEDURE — 84100 ASSAY OF PHOSPHORUS: CPT

## 2022-08-12 PROCEDURE — 99223 1ST HOSP IP/OBS HIGH 75: CPT | Mod: 24 | Performed by: SURGERY

## 2022-08-12 PROCEDURE — 94640 AIRWAY INHALATION TREATMENT: CPT | Mod: 76

## 2022-08-12 PROCEDURE — 87015 SPECIMEN INFECT AGNT CONCNTJ: CPT

## 2022-08-12 PROCEDURE — 99207 PR NO BILLABLE SERVICE THIS VISIT: CPT | Performed by: STUDENT IN AN ORGANIZED HEALTH CARE EDUCATION/TRAINING PROGRAM

## 2022-08-12 PROCEDURE — 83735 ASSAY OF MAGNESIUM: CPT

## 2022-08-12 PROCEDURE — 250N000012 HC RX MED GY IP 250 OP 636 PS 637: Performed by: PHYSICIAN ASSISTANT

## 2022-08-12 PROCEDURE — 250N000013 HC RX MED GY IP 250 OP 250 PS 637: Performed by: INTERNAL MEDICINE

## 2022-08-12 PROCEDURE — 250N000013 HC RX MED GY IP 250 OP 250 PS 637: Performed by: NURSE PRACTITIONER

## 2022-08-12 PROCEDURE — 120N000002 HC R&B MED SURG/OB UMMC

## 2022-08-12 PROCEDURE — 84157 ASSAY OF PROTEIN OTHER: CPT

## 2022-08-12 PROCEDURE — 87075 CULTR BACTERIA EXCEPT BLOOD: CPT

## 2022-08-12 PROCEDURE — 250N000012 HC RX MED GY IP 250 OP 636 PS 637: Performed by: NURSE PRACTITIONER

## 2022-08-12 RX ORDER — LIDOCAINE HYDROCHLORIDE 10 MG/ML
10 INJECTION, SOLUTION EPIDURAL; INFILTRATION; INTRACAUDAL; PERINEURAL ONCE
Status: COMPLETED | OUTPATIENT
Start: 2022-08-12 | End: 2022-08-12

## 2022-08-12 RX ORDER — LIDOCAINE HYDROCHLORIDE 10 MG/ML
INJECTION, SOLUTION EPIDURAL; INFILTRATION; INTRACAUDAL; PERINEURAL
Status: COMPLETED
Start: 2022-08-12 | End: 2022-08-12

## 2022-08-12 RX ADMIN — THERA TABS 1 TABLET: TAB at 11:59

## 2022-08-12 RX ADMIN — ACETYLCYSTEINE 2 ML: 200 INHALANT RESPIRATORY (INHALATION) at 20:34

## 2022-08-12 RX ADMIN — NYSTATIN 1000000 UNITS: 100000 SUSPENSION ORAL at 08:11

## 2022-08-12 RX ADMIN — OXYCODONE HYDROCHLORIDE 10 MG: 5 TABLET ORAL at 04:27

## 2022-08-12 RX ADMIN — Medication 1 PACKET: at 11:59

## 2022-08-12 RX ADMIN — TACROLIMUS 4 MG: 5 CAPSULE ORAL at 17:43

## 2022-08-12 RX ADMIN — NYSTATIN: 100000 CREAM TOPICAL at 08:37

## 2022-08-12 RX ADMIN — LIDOCAINE HYDROCHLORIDE 300 MG: 10 INJECTION, SOLUTION EPIDURAL; INFILTRATION; INTRACAUDAL; PERINEURAL at 15:26

## 2022-08-12 RX ADMIN — LEVALBUTEROL HYDROCHLORIDE 1.25 MG: 1.25 SOLUTION RESPIRATORY (INHALATION) at 20:34

## 2022-08-12 RX ADMIN — NYSTATIN 1000000 UNITS: 100000 SUSPENSION ORAL at 14:10

## 2022-08-12 RX ADMIN — TACROLIMUS 4 MG: 5 CAPSULE ORAL at 08:36

## 2022-08-12 RX ADMIN — NYSTATIN: 100000 CREAM TOPICAL at 20:04

## 2022-08-12 RX ADMIN — MYCOPHENOLATE MOFETIL 750 MG: 200 POWDER, FOR SUSPENSION ORAL at 08:33

## 2022-08-12 RX ADMIN — LEVALBUTEROL HYDROCHLORIDE 1.25 MG: 1.25 SOLUTION RESPIRATORY (INHALATION) at 08:03

## 2022-08-12 RX ADMIN — DAPSONE 50 MG: 25 TABLET ORAL at 12:03

## 2022-08-12 RX ADMIN — PREDNISONE 10 MG: 10 TABLET ORAL at 20:00

## 2022-08-12 RX ADMIN — ACETYLCYSTEINE 2 ML: 200 INHALANT RESPIRATORY (INHALATION) at 08:04

## 2022-08-12 RX ADMIN — CALCIUM CARBONATE 600 MG (1,500 MG)-VITAMIN D3 400 UNIT TABLET 1 TABLET: at 08:11

## 2022-08-12 RX ADMIN — METOPROLOL TARTRATE 50 MG: 50 TABLET, FILM COATED ORAL at 08:11

## 2022-08-12 RX ADMIN — CALCIUM CARBONATE 600 MG (1,500 MG)-VITAMIN D3 400 UNIT TABLET 1 TABLET: at 17:43

## 2022-08-12 RX ADMIN — Medication 40 MG: at 20:16

## 2022-08-12 RX ADMIN — PREDNISONE 12.5 MG: 2.5 TABLET ORAL at 08:11

## 2022-08-12 RX ADMIN — ONDANSETRON 4 MG: 4 TABLET, ORALLY DISINTEGRATING ORAL at 08:11

## 2022-08-12 RX ADMIN — NYSTATIN 1000000 UNITS: 100000 SUSPENSION ORAL at 17:43

## 2022-08-12 RX ADMIN — MYCOPHENOLATE MOFETIL 750 MG: 200 POWDER, FOR SUSPENSION ORAL at 20:16

## 2022-08-12 RX ADMIN — PANTOPRAZOLE SODIUM 40 MG: 40 INJECTION, POWDER, FOR SOLUTION INTRAVENOUS at 08:11

## 2022-08-12 RX ADMIN — INSULIN ASPART 2 UNITS: 100 INJECTION, SOLUTION INTRAVENOUS; SUBCUTANEOUS at 16:02

## 2022-08-12 RX ADMIN — OXYCODONE HYDROCHLORIDE 10 MG: 5 TABLET ORAL at 17:50

## 2022-08-12 RX ADMIN — OXYCODONE HYDROCHLORIDE 10 MG: 5 TABLET ORAL at 12:23

## 2022-08-12 RX ADMIN — FLUDROCORTISONE ACETATE 0.1 MG: 0.1 TABLET ORAL at 08:11

## 2022-08-12 RX ADMIN — PROCHLORPERAZINE MALEATE 10 MG: 5 TABLET ORAL at 12:19

## 2022-08-12 RX ADMIN — NYSTATIN 1000000 UNITS: 100000 SUSPENSION ORAL at 20:17

## 2022-08-12 RX ADMIN — HEPARIN SODIUM 850 UNITS/HR: 10000 INJECTION, SOLUTION INTRAVENOUS at 16:56

## 2022-08-12 RX ADMIN — VALGANCICLOVIR HYDROCHLORIDE 450 MG: 50 POWDER, FOR SOLUTION ORAL at 08:33

## 2022-08-12 RX ADMIN — INSULIN ASPART 1 UNITS: 100 INJECTION, SOLUTION INTRAVENOUS; SUBCUTANEOUS at 11:59

## 2022-08-12 RX ADMIN — METOPROLOL TARTRATE 50 MG: 50 TABLET, FILM COATED ORAL at 20:00

## 2022-08-12 ASSESSMENT — ACTIVITIES OF DAILY LIVING (ADL)
ADLS_ACUITY_SCORE: 30

## 2022-08-12 NOTE — PROGRESS NOTES
GASTROENTEROLOGY PANC/BILI PROGRESS NOTE    Date of Admission: 6/28/2022    ASSESSMENT:  Sofie Rodriguez is a 62 year old female with a history of HFpEF, and end stage COPD s/p bilateral lung transplant (6/28/22) complicated by acute limb ischemia of LUE requiring left radial thrombectomy, EBV viremia and C.diff (vancomycin 7/12/22-7/28/22). She was found to have delayed gastric emptying on GES and underwent percutaneous GJ tube placement by IR on 7/27/22. Evaluated by luminal GI for upper GI bleed and noted to have pyloric ulcer which may have been source of bleeding- Hb has since stabilized. Panc-Bili GI consulted d/t CBD dilation and elevated liver enzymes.     MRCP on 8/9 obtained due to elevated alk phos of 412 and ALT of 215, which was notable for mild dilation of intrahepatic and extrahepatic biliary ducts and gallbladder. Notably, hemorrhagic material was also identified in the gallbladder. Subsequently, EGD and ERCP done on 8/11 to evaluate for choledocholithiasis, which demonstrated mild erythema 2/2 PEG-J trauma near insertion site w/o active bleeding. Further advancement demonstrated concerning findings for hemobilia. Additionally, stent was placed across duodenum and in the CBD.     In review of total clinical course, it seems that the bleeding is originating from the gallbladder itself. No obvious etiology to explain this, but concern present for malignant process, such as cholangiocarcinoma. Given this, would recommend general surgery consult for cholecystectomy to not only provide ongoing control of likely bleeding source, but also ability to obtain pathology specimen to assess for malignancy.      RECOMMENDATIONS:  - General surgery consult for possible cholecystectomy  - Pt can do oral diet as tolerated with supplemental TF as indicated to meet caloric goal per dietician  - Decision on restarting heparin drip per Gen Surg and primary team  - Can transition to PO PPI in the absence of identified  "bleeding source and stable Hb  - GI will peripherally follow over the weekend- please call if any questions/concerns      Outpatient GI follow up:   -  Repeat upper endoscopy in 8 weeks (GI will arrange) to check healing of ulcer. Pending clinical picture and endoscopic findings may need to consider pyloric dilation if stricture develops from current inflammation.      The patient was discussed and plan agreed upon with GI staff, Dr. Anisa Castillo MD  PGY-2  Internal Medicine  _______________________________________________________________      Subjective:   -Nursing notes and 24h events reviewed; no acute events  -Pt notes that she has a very good appetite and would really enjoy eating solid foods  -Denies nausea/vomiting, no abdominal pain.   -Having regular, loose bowel movements.     ROS:   4 pt ROS negative unless noted in subjective.     Objective:  Blood pressure 137/85, pulse 99, temperature 98.3  F (36.8  C), temperature source Oral, resp. rate 16, height 1.575 m (5' 2\"), weight 67.9 kg (149 lb 9.6 oz), SpO2 100 %, not currently breastfeeding.  General: Sitting upright in bed, appears comfortable, engages in conversation  HEENT: Pupils equal and round, no scleral icterus.   CV: Peripheral perfusion appears intact  Pulm: Nonlabored breathing, no noted cough.   Abd: soft, nontender to palpation, nondistended. No rigidity, rebound tenderness, guarding.   Ext: Warm, no noted BL LE edema  Neuro: Alert and oriented  Psych: Pleasant, appropriate speech/language     LABS:  [facility]  Recent Labs   Lab 08/12/22  0835 08/12/22  0736 08/12/22  0406 08/12/22  0044 08/11/22  1016 08/11/22  0941 08/11/22  0747 08/11/22  0610 08/10/22  2314 08/10/22  2016 08/10/22  0650 08/10/22  0453 08/09/22  0742 08/09/22  0548   NA  --  139  --   --   --   --   --  138  --   --   --  138  --  136   POTASSIUM  --  5.2  --   --   --  4.5  --  5.4*  --  5.1   < > 5.7*   < > 5.5*  5.5*   CHLORIDE  --  99  --   --   --   --   " --  97*  --   --   --  97*  --  95*   ESTUARDO  --  8.7*  --   --   --   --   --  8.9  --   --   --  9.0  --  8.9   CO2  --  35*  --   --   --   --   --  37*  --   --   --  38*  --  39*   BUN  --  42.9*  --   --   --   --   --  49.5*  --   --   --  46.0*  --  55.6*   CR  --  1.42*  --   --   --   --   --  1.50*  --   --   --  1.36*  --  1.41*   * 133* 135* 116*   < >  --    < > 124*   < >  --    < > 111*   < > 219*    < > = values in this interval not displayed.     CBC  Recent Labs   Lab 08/12/22  0955 08/11/22  2339 08/11/22  1657 08/11/22  0610 08/10/22  0453   WBC 7.4  --  10.2 7.8 8.3   RBC 2.98*  --  2.76* 2.61* 2.78*   HGB 9.1*   < > 8.4* 7.9* 8.4*   HCT 29.5*  --  26.8* 25.3* 27.2*   MCV 99  --  97 97 98   MCH 30.5  --  30.4 30.3 30.2   MCHC 30.8*  --  31.3* 31.2* 30.9*   RDW 16.6*  --  16.6* 16.8* 17.4*     --  342 322 287    < > = values in this interval not displayed.     INR  Recent Labs   Lab 08/11/22  1810 08/09/22  0548 08/08/22  1630   INR 0.95 0.95 1.07     LFTs  Recent Labs   Lab 08/12/22  0736 08/11/22  0610 08/09/22  0548 08/08/22  0740   ALKPHOS 344* 412* 644* 861*   AST 15 19 235* 1,143*   * 215* 601* 791*   BILITOTAL 0.2 0.2 0.3 0.9   PROTTOTAL 5.0* 4.9* 4.8* 5.0*   ALBUMIN 2.6* 2.4* 2.5* 2.5*      PANC  Recent Labs   Lab 08/08/22  0740   LIPASE 18   AMYLASE 18*

## 2022-08-12 NOTE — CONSULTS
LUNG NODULE & INTERVENTIONAL PULMONARY CLINIC  [facility]     Sofie Rodriguez MRN# [id number]   Age: 62 year old YOB: 1960       Requesting Physician: No referring provider defined for this encounter.       Assessment and Plan:    1. Bilateral pleural effusion, post transplant, incidental. Low suspicion for infection. Possibility for third spacing, chylothorax and other issues.  We will do a diagnostic thora on the R side today.  Case discussed with primary team, transplant team.  Alternative to thora would be watchful waiting and monitor for signs of infection.    + 25           History:     Sofie Rodriguez is a 62 year old female with sig h/o for BSLT 6/28/22 for COPD who is here for evaluation/followup of pleural effusion.  She has had a long and complicated hospitalization.  Recently she had a significant GI bleed that required a CT scan.  This showed bilateral loculated effusions.  Previous CXR had not shown much fluid.  Her breathing has been improving and she is on 1 lpm oxygen but she is short of breath with exertion.  Strength getting better.  No chest cold or hemoptysis.        - My interpretation of the images relevant for this visit includes: CT with bilateral loculated basilar effusion              Past Medical History:      Past Medical History:   Diagnosis Date     CHF (congestive heart failure) (H)      COPD (chronic obstructive pulmonary disease) (H)      Hepatitis 2017    Hep C, [facility]     HTN (hypertension)      Osteopenia            Past Surgical History:      Past Surgical History:   Procedure Laterality Date     BRONCHOSCOPY (RIGID OR FLEXIBLE), DIAGNOSTIC N/A 8/2/2022    Procedure: BRONCHOSCOPY, DIAGNOSTIC- inspection Bronch;  Surgeon: Kamala Lovell MD;  Location:  GI     BRONCHOSCOPY FLEXIBLE AND RIGID N/A 07/19/2022    Procedure: BRONCHOSCOPY inspection only;  Surgeon: Bob Liao MD;  Location:   GI     COLONOSCOPY       CV CORONARY ANGIOGRAM N/A 2021    Procedure: CV CORONARY ANGIOGRAM;  Surgeon: Alexander Cuellar MD;  Location:  HEART CARDIAC CATH LAB     CV RIGHT HEART CATH MEASUREMENTS RECORDED N/A 2021    Procedure: CV RIGHT HEART CATH;  Surgeon: Alexander Cuellar MD;  Location:  HEART CARDIAC CATH LAB     ENT SURGERY  1974    tonsillectomy     ESOPHAGOGASTRODUODENOSCOPY, WITH NASOGASTRIC TUBE INSERTION N/A 2022    Procedure: ESOPHAGOGASTRODUODENOSCOPY, WITH NASOJEJUNAL TUBE INSERTION;  Surgeon: Ozzy Nickerson MD;  Location:  GI     ESOPHAGOSCOPY, GASTROSCOPY, DUODENOSCOPY (EGD), COMBINED N/A 8/3/2022    Procedure: ESOPHAGOGASTRODUODENOSCOPY (EGD);  Surgeon: Ira Andres MD;  Location:  GI     HAND SURGERY       LEEP TX, CERVICAL  2017    HECTOR III     LYMPH NODE BIOPSY Left     Left axilla, benign- [place]     MIDLINE INSERTION - DOUBLE LUMEN Left 2022    20cm, Basilic vein     THROMBECTOMY UPPER EXTREMITY Left 2022    Procedure: LEFT RADIAL ARM THROMBECTOMY;  Surgeon: Christie Graham MD;  Location:  OR     TRANSPLANT LUNG RECIPIENT SINGLE X2 Bilateral 2022    Procedure: Clamshell Incision, Bilateral Sequential Lung Transplant, On Cardiopulmonary Bypass, Flexible Bronchoscopy;  Surgeon: Sue Sunshine MD;  Location:  OR          Social History:     Social History     Tobacco Use     Smoking status: Former Smoker     Years: 30.00     Types: Cigarettes     Quit date: 2020     Years since quittin.7     Smokeless tobacco: Never Used   Substance Use Topics     Alcohol use: Not Currently          Family History:   History reviewed. No pertinent family history.        Allergies:      Allergies   Allergen Reactions     Blood Transfusion Related (Informational Only) Other (See Comments)     Patient has a history of a clinically significant antibody against RBC antigens.  A delay in compatible RBCs  may occur.           Medications:     Current Facility-Administered Medications   Medication     lidocaine (PF) (XYLOCAINE) 1 % injection     [Held by provider] acetaminophen (TYLENOL) tablet 650 mg     [Held by provider] acetaminophen (TYLENOL) tablet 975 mg     acetylcysteine (MUCOMYST) 20 % nebulizer solution 2 mL     [Held by provider] aspirin (ASA) chewable tablet 81 mg     benzocaine-menthol (CEPACOL) 15-3.6 MG lozenge 1 lozenge     calcium carbonate (TUMS) chewable tablet 500-1,000 mg     calcium carbonate 600 mg-vitamin D 400 units (CALTRATE) per tablet 1 tablet     dapsone (ACZONE) tablet 50 mg     dextrose 10% BOLUS     dextrose 10% infusion     glucose gel 15-30 g    Or     dextrose 50 % injection 25-50 mL    Or     glucagon injection 1 mg     glucose gel 15-30 g    Or     dextrose 50 % injection 25-50 mL    Or     glucagon injection 1 mg     fludrocortisone (FLORINEF) tablet 0.1 mg     heparin infusion 25,000 units in D5W 250 mL ANTICOAGULANT     hydrOXYzine (ATARAX) tablet 25 mg    Or     hydrOXYzine (ATARAX) tablet 50 mg     insulin aspart (NovoLOG) injection (RAPID ACTING)     [Held by provider] insulin glargine (LANTUS PEN) injection 7 Units     iopamidol 76% (ISOVUE 370) + NaCl 0.9%     levalbuterol (XOPENEX) neb solution 1.25 mg     Lidocaine (LIDOCARE) 4 % Patch 2 patch     lidocaine (PF) (XYLOCAINE) 1 % injection 10 mL     lidocaine patch in PLACE     magnesium hydroxide (MILK OF MAGNESIA) suspension 30 mL     metoprolol tartrate (LOPRESSOR) tablet 50 mg     multivitamin, therapeutic (THERA-VIT) tablet 1 tablet     mycophenolate (CELLCEPT BRAND) suspension 750 mg     naloxone (NARCAN) injection 0.2 mg    Or     naloxone (NARCAN) injection 0.4 mg    Or     naloxone (NARCAN) injection 0.2 mg    Or     naloxone (NARCAN) injection 0.4 mg     nystatin (MYCOSTATIN) cream     nystatin (MYCOSTATIN) suspension 1,000,000 Units     ondansetron (ZOFRAN ODT) ODT tab 4 mg     oxyCODONE (ROXICODONE) tablet 5-10  "mg     pantoprazole (PROTONIX) 2 mg/mL suspension 40 mg     predniSONE (DELTASONE) tablet 12.5 mg    And     predniSONE (DELTASONE) tablet 10 mg     prochlorperazine (COMPAZINE) tablet 10 mg     protein modular (PROSOURCE TF) 1 packet     simethicone (MYLICON) suspension 40 mg     sodium chloride (PF) 0.9% PF flush 10 mL     sodium chloride (PF) 0.9% PF flush 10-20 mL     sodium chloride (PF) 0.9% PF flush 10-20 mL     sodium chloride (PF) 0.9% PF flush 3 mL     tacrolimus (GENERIC EQUIVALENT) suspension 4 mg     valGANciclovir (VALCYTE) solution 450 mg          Review of Systems:   ROS comprehensive negative except as above         Physical Exam:   /73 (BP Location: Right arm, Cuff Size: Adult Regular)   Pulse 108   Temp 98.8  F (37.1  C) (Oral)   Resp 16   Ht 1.575 m (5' 2\")   Wt 67.9 kg (149 lb 9.6 oz)   SpO2 99%   BMI 27.36 kg/m      Constitutional - fatigued, in no apparent distress  Neck supple without masses  Eyes - no redness or discharge  Respiratory -breathing appears comfortable. No wheeze or rhonchi. Decreased breath sounds at base.  Cardiac -- Normal rate, rhythm. Normal pulses  Abdomen soft without masses  Skin - No appreciable discoloration or lesions (very limited exam)  Neurological - No apparent tremors. Speech fluent and articlate  Psychiatric - no signs of delirium or anxiety          Current Laboratory Data:   All laboratory and imaging data reviewed.    Results for orders placed or performed during the hospital encounter of 06/28/22 (from the past 24 hour(s))   Glucose by meter   Result Value Ref Range    GLUCOSE BY METER POCT 237 (H) 70 - 99 mg/dL   CBC with Platelets & Differential    Narrative    The following orders were created for panel order CBC with Platelets & Differential.  Procedure                               Abnormality         Status                     ---------                               -----------         ------                     CBC with platelets and " d...[[id number]]  Abnormal            Final result                 Please view results for these tests on the individual orders.   CBC with platelets and differential   Result Value Ref Range    WBC Count 10.2 4.0 - 11.0 10e3/uL    RBC Count 2.76 (L) 3.80 - 5.20 10e6/uL    Hemoglobin 8.4 (L) 11.7 - 15.7 g/dL    Hematocrit 26.8 (L) 35.0 - 47.0 %    MCV 97 78 - 100 fL    MCH 30.4 26.5 - 33.0 pg    MCHC 31.3 (L) 31.5 - 36.5 g/dL    RDW 16.6 (H) 10.0 - 15.0 %    Platelet Count 342 150 - 450 10e3/uL    % Neutrophils 90 %    % Lymphocytes 2 %    % Monocytes 6 %    % Eosinophils 0 %    % Basophils 0 %    % Immature Granulocytes 2 %    NRBCs per 100 WBC 0 <1 /100    Absolute Neutrophils 9.1 (H) 1.6 - 8.3 10e3/uL    Absolute Lymphocytes 0.2 (L) 0.8 - 5.3 10e3/uL    Absolute Monocytes 0.6 0.0 - 1.3 10e3/uL    Absolute Eosinophils 0.0 0.0 - 0.7 10e3/uL    Absolute Basophils 0.0 0.0 - 0.2 10e3/uL    Absolute Immature Granulocytes 0.2 <=0.4 10e3/uL    Absolute NRBCs 0.0 10e3/uL   XR Surgery LARISA Fluoro Less Than 5 Min w Stills    Narrative    This exam was marked as non-reportable because it will not be read by a   radiologist or a [facility] non-radiologist provider.         INR   Result Value Ref Range    INR 0.95 0.85 - 1.15   CTA Abdomen Pelvis with Contrast    Narrative    EXAMINATION: CTA ABDOMEN PELVIS WITH CONTRAST, 8/11/2022 8:44 PM    TECHNIQUE:  Helical CT images from the lung bases through the pubic  symphysis were obtained  with IV contrast.     COMPARISON: Body MR 8/9/2022. Abdominal ultrasound 8/8/2022    HISTORY: hemobilia found on ERCP with stent placement    FINDINGS:  Lung bases:  Similar appearance of bilateral loculated moderate pleural effusions  with adjacent compressive atelectasis and lower lobe predominant  interlobular septal thickening suggestive of pulmonary edema.    Abdomen and pelvis:   There is unremarkable. No focal enhancing lesion. Peripheral  ill-defined areas of enhancement likely secondary to  transient hepatic  attenuation differences. No intra or extrahepatic biliary duct  dilation. Spleen size is within normal limits. Postsurgical  cholecystectomy changes. Common bile duct stent and pancreatic duct  stents with small volume pneumobilia. The main pancreatic duct is not  dilated. Homogenous enhancement of the pancreas.    Adrenals are within normal limits. Symmetric renal enhancement. No  hydronephrosis, calcified stone, contour deforming mass. Bladder is  distended and normal in appearance. Bladder is unremarkable.    Percutaneous jejunostomy tube with tip terminating in the proximal  jejunum. No small or large bowel wall thickening or dilation. The  appendix is not well-visualized, however there are no significant  inflammatory changes in the right lower quadrant. No free air. No  pneumatosis or portal venous gas. Small volume ascites.    Abdominal aorta measure branches are normal in caliber and patent with  moderate predominantly aortoiliac calcifications.    No mesenteric, retroperitoneal, or pelvic lymphadenopathy.    Bones and soft tissues: No suspicious or acute osseous lesion. Soft  tissues are unremarkable.    1.      Impression    IMPRESSION:   2.  Interval placement of common bile and pancreatic duct stents with  small volume pneumobilia. No evidence for acute GI bleed. Contrast  visualized in the distal small bowel from same day ERCP.  3.  No acute findings in the abdomen or pelvis. Small volume free  pelvic fluid.  4.  Similar appearance of bilateral loculated moderate pleural  effusions with adjacent compressive atelectasis and interlobular  septal thickening suggestive for pulmonary edema.    I have personally reviewed the examination and initial interpretation  and I agree with the findings.    JOHANN MORAES MD         SYSTEM ID:  [id number]   Glucose by meter   Result Value Ref Range    GLUCOSE BY METER POCT 148 (H) 70 - 99 mg/dL   Hemoglobin   Result Value Ref Range    Hemoglobin 8.1  (L) 11.7 - 15.7 g/dL   Glucose by meter   Result Value Ref Range    GLUCOSE BY METER POCT 116 (H) 70 - 99 mg/dL   Glucose by meter   Result Value Ref Range    GLUCOSE BY METER POCT 135 (H) 70 - 99 mg/dL   Tacrolimus by Tandem Mass Spectrometry   Result Value Ref Range    Tacrolimus by Tandem Mass Spectrometry 8.2 5.0 - 15.0 ug/L    Tacrolimus Last Dose Date      Tacrolimus Last Dose Time      Narrative    This test was developed and its performance characteristics determined by the [facility],  Special Chemistry Laboratory. It has not been cleared or approved by the FDA. The laboratory is regulated under CLIA as qualified to perform high-complexity testing. This test is used for clinical purposes. It should not be regarded as investigational or for research.   Comprehensive metabolic panel   Result Value Ref Range    Sodium 139 136 - 145 mmol/L    Potassium 5.2 3.4 - 5.3 mmol/L    Creatinine 1.42 (H) 0.51 - 0.95 mg/dL    Urea Nitrogen 42.9 (H) 8.0 - 23.0 mg/dL    Chloride 99 98 - 107 mmol/L    Carbon Dioxide (CO2) 35 (H) 22 - 29 mmol/L    Anion Gap 5 (L) 7 - 15 mmol/L    Glucose 133 (H) 70 - 99 mg/dL    Calcium 8.7 (L) 8.8 - 10.2 mg/dL    Protein Total 5.0 (L) 6.4 - 8.3 g/dL    Albumin 2.6 (L) 3.5 - 5.2 g/dL    Bilirubin Total 0.2 <=1.2 mg/dL    Alkaline Phosphatase 344 (H) 35 - 104 U/L    AST 15 10 - 35 U/L     (H) 10 - 35 U/L    GFR Estimate 42 (L) >60 mL/min/1.73m2   Magnesium   Result Value Ref Range    Magnesium 2.1 1.7 - 2.3 mg/dL   Phosphorus   Result Value Ref Range    Phosphorus 3.9 2.5 - 4.5 mg/dL   Glucose by meter   Result Value Ref Range    GLUCOSE BY METER POCT 129 (H) 70 - 99 mg/dL   CBC with platelets   Result Value Ref Range    WBC Count 7.4 4.0 - 11.0 10e3/uL    RBC Count 2.98 (L) 3.80 - 5.20 10e6/uL    Hemoglobin 9.1 (L) 11.7 - 15.7 g/dL    Hematocrit 29.5 (L) 35.0 - 47.0 %    MCV 99 78 - 100 fL    MCH 30.5 26.5 - 33.0 pg    MCHC 30.8 (L) 31.5 - 36.5 g/dL    RDW  16.6 (H) 10.0 - 15.0 %    Platelet Count 344 150 - 450 10e3/uL   Extra Tube    Narrative    The following orders were created for panel order Extra Tube.  Procedure                               Abnormality         Status                     ---------                               -----------         ------                     Extra Green Top (Lithium...[[id number]]                      Final result                 Please view results for these tests on the individual orders.   Extra Green Top (Lithium Heparin) Tube   Result Value Ref Range    Hold Specimen [facility]    Glucose by meter   Result Value Ref Range    GLUCOSE BY METER POCT 144 (H) 70 - 99 mg/dL

## 2022-08-12 NOTE — PROVIDER NOTIFICATION
6515 - LL - CT done, proceed with Tube Feeds in J tube?      [place] [id number]    MD called back ok to use J -tube for meds only pending HGB    HGB 8.1 per MD, start tube feeds at 10/hr

## 2022-08-12 NOTE — CONSULTS
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 She was found to have delayed gastric emptying on GES and underwent percutaneous GJ tube placement by IR on 22. Evaluated by luminal GI for upper GI bleed and noted to have pyloric ulcer which may have been source of bleeding- Hb has since stabilized. MRCP on  showed mild dilation of intra and extrahepatic biliary ducts and gallbladder, as well as hemorrhagic material in gallbladder. EGD and ERCP  with finding of hemobilia through the CBD.  EGS  Consulted for hemobilia, concern for malignancy, and consideration of cholecystectomy.     - Would not recommend cholecystectomy if concern for malignancy  - Recommend starting cancer work up -  CEA, CA 19   - Recommend surgical oncology consult    Discussed with Dr. Frederick Allison MD  General Surgery PGY 2 Resident  Pager: [phone] (6am - 6pm. Please page on call resident outside of these hours).    History of Present Illness:    Sofie Rodriguez is a 62 year old female with PMH of HFpEF, end stage COPD s/p bilateral lung transplant (22) complicated by acute limb ischemia of LUE requiring left radial thrombectomy, EBV viremia and C.diff (vancomycin 22-22). She was found to have delayed gastric emptying on GES and underwent percutaneous GJ tube placement by IR on 22. Evaluated by luminal GI for upper GI bleed and noted to have pyloric ulcer which may have been source of bleeding- Hb has since stabilized. MRCP on  showed mild dilation of intra and extrahepatic biliary ducts and gallbladder, as well as hemorrhagic material in gallbladder. EGD and ERCP   with finding of hemobilia through the CBD.  EGS  Consulted for hemobilia and concern for cholangiocarcinoma.     The patient reports that she currently has epigastric pain with intermittent nausea, no emesis, passing gas and having bowel movements. Feels fatigued but denies lightheadedness.     Past Medical History:    Past Medical History:   Diagnosis Date     CHF (congestive heart failure) (H)      COPD (chronic obstructive pulmonary disease) (H)      Hepatitis 2017    Hep C, [facility]     HTN (hypertension)      Osteopenia        Past Surgical History    Past Surgical History:   Procedure Laterality Date     BRONCHOSCOPY (RIGID OR FLEXIBLE), DIAGNOSTIC N/A 8/2/2022    Procedure: BRONCHOSCOPY, DIAGNOSTIC- inspection Bronch;  Surgeon: Kamala Lovell MD;  Location:  GI     BRONCHOSCOPY FLEXIBLE AND RIGID N/A 07/19/2022    Procedure: BRONCHOSCOPY inspection only;  Surgeon: Bob Liao MD;  Location:  GI     COLONOSCOPY  2015     CV CORONARY ANGIOGRAM N/A 06/30/2021    Procedure: CV CORONARY ANGIOGRAM;  Surgeon: Alexander Cuellar MD;  Location:  HEART CARDIAC CATH LAB     CV RIGHT HEART CATH MEASUREMENTS RECORDED N/A 06/30/2021    Procedure: CV RIGHT HEART CATH;  Surgeon: Alexander Cuellar MD;  Location:  HEART CARDIAC CATH LAB     ENT SURGERY  1974    tonsillectomy     ESOPHAGOGASTRODUODENOSCOPY, WITH NASOGASTRIC TUBE INSERTION N/A 07/01/2022    Procedure: ESOPHAGOGASTRODUODENOSCOPY, WITH NASOJEJUNAL TUBE INSERTION;  Surgeon: Ozzy Nickerson MD;  Location:  GI     ESOPHAGOSCOPY, GASTROSCOPY, DUODENOSCOPY (EGD), COMBINED N/A 8/3/2022    Procedure: ESOPHAGOGASTRODUODENOSCOPY (EGD);  Surgeon: Ira Andres MD;  Location:  GI     HAND SURGERY       LEEP TX, CERVICAL  04/07/2017    HECTOR III     LYMPH NODE BIOPSY Left 2005    Left axilla, benign- [place]     MIDLINE INSERTION - DOUBLE LUMEN Left 07/28/2022    20cm, Basilic vein     THROMBECTOMY UPPER EXTREMITY Left 07/02/2022     Procedure: LEFT RADIAL ARM THROMBECTOMY;  Surgeon: Christie Graham MD;  Location: [facility] OR     TRANSPLANT LUNG RECIPIENT SINGLE X2 Bilateral 2022    Procedure: Clamshell Incision, Bilateral Sequential Lung Transplant, On Cardiopulmonary Bypass, Flexible Bronchoscopy;  Surgeon: Sue Sunshine MD;  Location: [facility] OR       Family History:  History reviewed. No pertinent family history.    Social History:  Social History     Socioeconomic History     Marital status: Single     Spouse name: Not on file     Number of children: Not on file     Years of education: Not on file     Highest education level: Not on file   Occupational History     Not on file   Tobacco Use     Smoking status: Former Smoker     Years: 30.00     Types: Cigarettes     Quit date: 2020     Years since quittin.7     Smokeless tobacco: Never Used   Substance and Sexual Activity     Alcohol use: Not Currently     Drug use: Not Currently     Types: Marijuana, Methamphetamines     Comment: hx:marijuana and methamphetamine-quit both unsure ?  2-3 yrs ago     Sexual activity: Not on file   Other Topics Concern     Parent/sibling w/ CABG, MI or angioplasty before 65F 55M? Not Asked   Social History Narrative     Not on file     Social Determinants of Health     Financial Resource Strain: Not on file   Food Insecurity: Not on file   Transportation Needs: Not on file   Physical Activity: Not on file   Stress: Not on file   Social Connections: Not on file   Intimate Partner Violence: Not on file   Housing Stability: Not on file       Medications:  No current outpatient medications on file.       Allergies:     Allergies   Allergen Reactions     Blood Transfusion Related (Informational Only) Other (See Comments)     Patient has a history of a clinically significant antibody against RBC antigens.  A delay in compatible RBCs may occur.        Review of Symptoms:  A 10 point review of symptoms has been conducted and is negative except  "for that mentioned in the above HPI.    Physical Exam:    Blood pressure 130/73, pulse 108, temperature 98.8  F (37.1  C), temperature source Oral, resp. rate 16, height 1.575 m (5' 2\"), weight 67.9 kg (149 lb 9.6 oz), SpO2 99 %, not currently breastfeeding.  Gen:    Lying in bed in NAD  HEENT: Normocephalic and atraumatic  CV:  RRR  Pulm:  Non-labored breathing on room air  Abd:  Soft, tender to palpation in epigastrium, non-distended  Neuro:   A&OX3, CN grossly intact  Ext:  Warm and well perfused, no obvious deformities  Msk:  No joint pain or swelling   Skin:   No obvious rash or discoloration    Labs:  CBC RESULTS:   Recent Labs   Lab Test 08/12/22  0955   WBC 7.4   RBC 2.98*   HGB 9.1*   HCT 29.5*   MCV 99   MCH 30.5   MCHC 30.8*   RDW 16.6*        Last Basic Metabolic Panel:  Lab Results   Component Value Date     08/12/2022     06/30/2021      Lab Results   Component Value Date    POTASSIUM 5.2 08/12/2022    POTASSIUM 5.2 07/06/2022    POTASSIUM 4.4 06/30/2021     Lab Results   Component Value Date    CHLORIDE 99 08/12/2022    CHLORIDE 95 04/29/2022    CHLORIDE 100 06/30/2021     Lab Results   Component Value Date    ESTUARDO 8.7 08/12/2022    ESTUARDO 9.6 06/30/2021     Lab Results   Component Value Date    CO2 35 08/12/2022    CO2 42 04/29/2022    CO2 38 06/30/2021     Lab Results   Component Value Date    BUN 42.9 08/12/2022    BUN 21 04/29/2022    BUN 30 06/30/2021     Lab Results   Component Value Date    CR 1.42 08/12/2022    CR 0.85 06/30/2021     Lab Results   Component Value Date     08/12/2022     08/12/2022     04/29/2022     06/30/2021       Imaging:  EXAMINATION: US ABDOMEN LIMITED WITH DOPPLER COMPLETE PORTABLE  8/8/2022 5:56 PM      COMPARISON: CT chest 7/28/2022     HISTORY: New abdominal pain in setting of change in tube feeds but  with rising LFTs concerning for cholangitis versus hepatitis.     TECHNIQUE: The abdomen was scanned in standard fashion " with  specialized ultrasound transducer(s) using both gray-scale, color  Doppler, and spectral flow techniques.     Findings:  Liver: The liver demonstrates normal homogeneous echotexture measuring  12.5 cm in length. No evidence of a focal hepatic mass.      Extrahepatic portal vein flow is antegrade at 24 cm/s.  Right portal vein flow is antegrade, measuring 26 cm/s.  Left portal vein flow is antegrade, measuring 33 cm/s.     Flow in the hepatic artery is towards the liver and:  94 cm/s peak systolic  0.65 resistive index.      The splenic vein is patent and flow is towards the liver.  The left,  middle, and right hepatic veins are patent with flow towards the IVC.  The IVC is patent with flow towards the heart.   The visualized aorta  is not dilated.     Gallbladder: Layering sludge in the gallbladder with borderline wall  thickening measuring up to 4 mm. No pericholecystic fluid, positive  sonographic Sánchez's sign or evidence for cholelithiasis     Bile Ducts: Intrahepatic biliary dilation with an echogenic  extrahepatic mass at the level of the common bile duct measuring 3.7 x  2.8 x 1.8 cm. The common bile duct measures 11 mm.     Pancreas: Visualized portions of the head and body of the pancreas are  unremarkable.      Kidneys: Both kidneys are of normal echotexture, without mass or  hydronephrosis.   Renal lengths: right- 8.1 cm.     Fluid: Small right pleural effusion.                                                                      Impression:   1.  Extrahepatic mass at the level of the common bile duct with  associated intrahepatic and common bile duct dilation measuring 3.7 x  2.8 x 1.8 cm. Differential includes malignancy including  cholangiocarcinoma. Prior PET/CT from July of last year did not show  any lesions in this area. It did however show a duodenal diverticulum.  Recommend follow-up abdominal liver MRI with MRCP or pancreatic mass  CT for further evaluation.  2.  Patent hepatic vasculature  without sonographic evidence to suggest  hemodynamically significant stenosis.  3.  Layering gallbladder sludge without additional evidence to suggest  acute cholecystitis.  4.  Small right pleural effusion.      Exam: MR ABDOMEN MRCP W/O & W CONTRAST, 8/10/2022 7:59 AM     Indication: Extra-hepatic mass seen on US     Comparison: 8/8/2022, 7/15/2010     Technique: Images were acquired with and without intravenous  gadolinium contrast through the upper abdomen. The following MR images  were acquired without intravenous contrast: TrueFISP, multiplanar  T2-weighted, axial T1 in/out of phase, T2-weighted MRCP images, axial  diffusion-weighted and axial apparent diffusion coefficient.  T1-weighted images were obtained before contrast at the multiple time  points following contrast injection. 3-D reformatted images were  generated by the technologist. Contrast dose: 7.5mL Gadavist     FINDINGS:     Liver: Normal     Gallbladder/Bile Ducts: Slightly increased mild intrahepatic and  moderate extrahepatic biliary dilation with common hepatic duct  measuring up to 21 mm (series 4, image 9) and the common bile duct  measuring up to 11 mm with level of transition at the gallbladder neck  which bulges medially into the duct. The gallbladder is hydropic and  contains moderate amount of T1 hyperintense material, which extends  into the bladder neck with folding of the gallbladder wall at the  transition between the neck and body. No filling defect within the  intra or extrahepatic biliary tree.     Spleen: Normal     Kidneys: Subcentimeter right renal cortical cysts.     Adrenal glands: Normal     Pancreas: No atrophy. Nondilated pancreatic duct.  Cystic foci in the pancreatic head measuring 4 x 3 mm superiorly and 4  x 3 mm inferiorly (series 15, image 48 and 50 respectively).     Bowel: Nondilated.  Percutaneous gastrojejunostomy tube with tip in the proximal jejunum  in the right mid abdomen.     Lymph nodes: No  adenopathy     Blood vessels: Patent portal, splenic and superior mesenteric veins  without thrombus, Conventional hepatic arterial anatomy     Lung bases: Loculated bilateral pleural effusions. Basilar opacities.     Bones and soft tissues: No acute osseous abnormality     Mesentery and abdominal wall: No hernia     Ascites: Small volume                                                                      IMPRESSION:   1.  Slightly increased moderate extrahepatic and mild intrahepatic  biliary dilation with the common hepatic duct measuring up to 2.1 cm  and the common bile duct measuring up to 1.1 cm in diameter with  transition in the upper common bile duct at the level of the hydropic  gallbladder, which bulges medially. The gallbladder contains a  moderate amount of proteinaceous/hemorrhagic material, which extends  into the gallbladder neck, corresponding to the findings on comparison  ultrasound. Consider HIDA scan as clinically warranted to assess for  acute cholecystitis. There is no solid mass as clinically queried.  2.  Loculated bilateral pleural effusions.  3.  Small volume ascites.  4.  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 Recommend follow-up as described below.        EXAMINATION: CTA ABDOMEN PELVIS WITH CONTRAST, 8/11/2022 8:44 PM     TECHNIQUE:  Helical CT images from the lung bases through the pubic  symphysis were obtained  with IV contrast.      COMPARISON: Body MR 8/9/2022. Abdominal ultrasound 8/8/2022     HISTORY: hemobilia found on ERCP with stent placement     FINDINGS:  Lung bases:  Similar appearance of bilateral loculated moderate pleural effusions  with adjacent compressive atelectasis and lower lobe predominant  interlobular septal thickening suggestive of pulmonary edema.     Abdomen and pelvis:   There is unremarkable. No focal enhancing lesion. Peripheral  ill-defined areas of enhancement likely secondary to transient hepatic  attenuation  differences. No intra or extrahepatic biliary duct  dilation. Spleen size is within normal limits. Postsurgical  cholecystectomy changes. Common bile duct stent and pancreatic duct  stents with small volume pneumobilia. The main pancreatic duct is not  dilated. Homogenous enhancement of the pancreas.     Adrenals are within normal limits. Symmetric renal enhancement. No  hydronephrosis, calcified stone, contour deforming mass. Bladder is  distended and normal in appearance. Bladder is unremarkable.     Percutaneous jejunostomy tube with tip terminating in the proximal  jejunum. No small or large bowel wall thickening or dilation. The  appendix is not well-visualized, however there are no significant  inflammatory changes in the right lower quadrant. No free air. No  pneumatosis or portal venous gas. Small volume ascites.     Abdominal aorta measure branches are normal in caliber and patent with  moderate predominantly aortoiliac calcifications.     No mesenteric, retroperitoneal, or pelvic lymphadenopathy.     Bones and soft tissues: No suspicious or acute osseous lesion. Soft  tissues are unremarkable.                                    IMPRESSION:   2.  Interval placement of common bile and pancreatic duct stents with  small volume pneumobilia. No evidence for acute GI bleed. Contrast  visualized in the distal small bowel from same day ERCP.  3.  No acute findings in the abdomen or pelvis. Small volume free  pelvic fluid.  4.  Similar appearance of bilateral loculated moderate pleural  effusions with adjacent compressive atelectasis and interlobular  septal thickening suggestive for pulmonary edema.

## 2022-08-12 NOTE — PLAN OF CARE
Shift Highlights:   Pt is alert and oriented, able to make needs known.     VSS, unlabored breathing on 1 lpm O2. Able to tolerate RA for short periods of time.     K+ shifted prior to ERCP procedure, K+ 4.5 with 0930 check.     Pt left for OR at approximately 1230. Terminal clean completed in patient room by EVS while patient out of room for procedure. Per infection control nursing and discussion with MD on 8/10, pt no longer considered infectious for c.diff and infection control RN stated patient could come off precautions once environment cleaned. Patients personal items were also wiped down with bleach wipes and all equipment in the room was reordered.     Pt returned to floor at 1845 accompanied by PACU RN. VS at baseline, pt complaining of abdominal pain. Remains NPO/tube feeds on hold pending results of CT. Cross-cover put in orders for one-time dose of hydromorphone, oncoming RN to give if still needed.     GT to gravity, J-tube clamped. No new skin issues noted post-procedure.       Goal Outcome Evaluation:    Plan of Care Reviewed With: patient, daughter     Overall Patient Progress: improving    Outcome Evaluation: K+ shifted, ERCP procedure done today.

## 2022-08-12 NOTE — PLAN OF CARE
"/71 (BP Location: Right arm, Cuff Size: Adult Regular)   Pulse 98   Temp 98.4  F (36.9  C) (Oral)   Resp 14   Ht 1.575 m (5' 2\")   Wt 67.9 kg (149 lb 9.6 oz)   SpO2 100%   BMI 27.36 kg/m         Shift Summary     Neuro: A&Ox4.  Able to CARMONA up with Stand by assist in the room. Minimal assistance with repositioning.      Cardiac: NSR on tele.      Resp: 1L nasal cannula through the night. Denies feeling short of breath. Diminished, clear lung sounds.     GI/: Last bowel movement 8/12/22. Voiding in bedside commode with 1 assist to transfer with adequate UO. NPO TF's  restarted at 0130 at 10/hr with stable HGB 8.1. pt tolerating tube feeds,. Denies N&&V. G-tube to gravity with bile output.     Skin: Redness to bottom and vinayak area. Old CT sites. No new deficits.      PRN: Oxycodone 10 mg given via J-tube  X 2 for pain with relief.     EGD, ERCP  and CTA completed (-)  for bleeding. No acute changes noted.           Problem: Pain (Lung Surgery)  Goal: Acceptable Pain Control  Intervention: Prevent or Manage Pain  Recent Flowsheet Documentation  Taken 8/12/2022 0400 by Kassy Luther RN  Pain Management Interventions: medication (see MAR)  Taken 8/12/2022 0030 by Kassy Luther RN  Pain Management Interventions: medication (see MAR)  Taken 8/11/2022 2051 by Kassy Luther, RN  Pain Management Interventions: medication (see MAR)     Problem: Postoperative Urinary Retention (Lung Surgery)  Goal: Effective Urinary Elimination  Outcome: Ongoing, Progressing       "

## 2022-08-12 NOTE — PROCEDURES
INTERVENTIONAL PULMONOLOGY       Procedure(s):    Thoracentesis (right chest)  Ultrasound guidance     Indication:  Bilateral pleural effusion    Attending of Record:  Mason Reddy MD    Interventional Pulmonary Fellow   Kristen Ogden MD     Trainees Present:   None     Medications:    8 ml 1% lidocaine    Sedation Time:   None    Time Out:  Performed    The patient's medical record has been reviewed.  The indication for the procedure was reviewed.  The necessary history and physical examination was performed and reviewed.  The risks, benefits and alternatives of the procedure were discussed with the the patient in detail and she had the opportunity to ask questions.  I discussed in particular the potential complications including risks of minor or life-threatening bleeding and/or infection, respiratory failure, vocal cord trauma / paralysis, pneumothorax, and discomfort. Sedation risks were also discussed including abnormal heart rhythms, low blood pressure, and respiratory failure. All questions were answered to the best of my ability.  Verbal and written informed consent was obtained.  The proposed procedure and the patient's identification were verified prior to the procedure by the physician and the nurse.    The patient was assessed for the adequacy for the procedure and to receive medications.   Mental Status:  Alert and oriented x 3  Airway examination:  Class I (Complete visualization of soft palate)  Pulmonary:  Non labored respirations  CV:  Regular rate  ASA Grade:  (II)  Mild systemic disease    After clinical evaluation and reviewing the indication, risks, alternatives and benefits of the procedure the patient was deemed to be in satisfactory condition to undergo the procedure.      Immediately before administration of medications the patient was re-assessed for adequacy to receive sedatives including the heart rate, respiratory rate, mental status, oxygen saturation, blood pressure and adequacy  of pulmonary ventilation. These same parameters were continuously monitored throughout the procedure.    A Tuberculosis risk assessment was performed:  The patient has no known RISK of Tuberculosis    The procedure was performed in a negative airflow room: The patient could not be moved to a negative airflow room because of critical illness and instability    Maneuvers / Procedure:      Thoracentesis: Once the site was marked using US, A 8F Arrow thoracentesis catheter was used to aspirate fluid. The patient's right chest was prepped in sterile fashion. A total of 8 cc 1%lidocaine was used to anesthetize the area. A finder needle was used to aspirate fluid. The tube was then advanced into the pleural space while aspirating pleural fluid. The fluid was straw colored in color/consistency. A total of 100cc of fluid was removed. While suctioning the last syringe of fluid, air was seen in the syringe in the form of bubbles. Procedure was stopped. Fluid was sent to micro and cytology for analysis. Chest x-ray is pending at the time of this note.     Any disposable equipment was visually inspected and deemed to be intact immediately post procedure.        Recommendations:     1. Follow up chest x-ray  2. Follow up pleural fluid studies.       Neftali Ogden  Interventional pulmonary fellow  Pager: [phone]

## 2022-08-12 NOTE — CONSULTS
[facility] Surgery Consultation    Sofie Rodriguez MRN# [id number]   Age: 62 year old YOB: 1960     Date of Admission:  6/28/2022    Date of Consult:   6/28/22    Reason for consult: Evaluate for gallbladder maligancy       Requesting service: General surgery; requesting provider: Dr. Alexander Mack                   Assessment and Plan:   Assessment:   62yoF recent lung transplant with concern for hemobilia. Surgery consulted due to concern for malignancy due to bleeding from gallbladder        Plan:   -Recommend discussion of anticoagulation with vascular surgery, would recommend not anticoagulating a patient with concern for bleeding into the biliary tree  -Will discuss with gastroenterology    Discussed with staff, Dr. 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  She was noted to have elevated transaminases earlier this week and underwent US which was notable for biliary ductal dilatation and concern for a mass.  She then underwent MRCP which did not identify a mass.  Yesterday she underwent ERCP which noted hematin in D2/D3 without active bleeding.  Post procedure CTA did not identify bleeding, and hgb has been stable. The patient endorses mild RUQ discomfort, but denies problems with pain after eating.               Past Medical History:     Past Medical History:   Diagnosis Date     CHF (congestive heart failure) (H)      COPD (chronic obstructive pulmonary disease) (H)      Hepatitis 2017    Hep C, [facility]     HTN (hypertension)      Osteopenia              Past Surgical History:     Past Surgical History:   Procedure Laterality Date     BRONCHOSCOPY (RIGID OR FLEXIBLE), DIAGNOSTIC N/A 8/2/2022    Procedure: BRONCHOSCOPY, DIAGNOSTIC- inspection Bronch;   Surgeon: Kamala Lovell MD;  Location:  GI     BRONCHOSCOPY FLEXIBLE AND RIGID N/A 2022    Procedure: BRONCHOSCOPY inspection only;  Surgeon: Bob Liao MD;  Location:  GI     COLONOSCOPY       CV CORONARY ANGIOGRAM N/A 2021    Procedure: CV CORONARY ANGIOGRAM;  Surgeon: Alexander Cuellar MD;  Location:  HEART CARDIAC CATH LAB     CV RIGHT HEART CATH MEASUREMENTS RECORDED N/A 2021    Procedure: CV RIGHT HEART CATH;  Surgeon: Alexander Cuellar MD;  Location:  HEART CARDIAC CATH LAB     ENT SURGERY      tonsillectomy     ESOPHAGOGASTRODUODENOSCOPY, WITH NASOGASTRIC TUBE INSERTION N/A 2022    Procedure: ESOPHAGOGASTRODUODENOSCOPY, WITH NASOJEJUNAL TUBE INSERTION;  Surgeon: Ozzy Nickerson MD;  Location:  GI     ESOPHAGOSCOPY, GASTROSCOPY, DUODENOSCOPY (EGD), COMBINED N/A 8/3/2022    Procedure: ESOPHAGOGASTRODUODENOSCOPY (EGD);  Surgeon: Ira Andres MD;  Location:  GI     HAND SURGERY       LEEP TX, CERVICAL  2017    HECTOR III     LYMPH NODE BIOPSY Left     Left axilla, benign- [place]     MIDLINE INSERTION - DOUBLE LUMEN Left 2022    20cm, Basilic vein     THROMBECTOMY UPPER EXTREMITY Left 2022    Procedure: LEFT RADIAL ARM THROMBECTOMY;  Surgeon: Christie Graham MD;  Location:  OR     TRANSPLANT LUNG RECIPIENT SINGLE X2 Bilateral 2022    Procedure: Clamshell Incision, Bilateral Sequential Lung Transplant, On Cardiopulmonary Bypass, Flexible Bronchoscopy;  Surgeon: Sue Sunshine MD;  Location:  OR             Social History:     Social History     Tobacco Use     Smoking status: Former Smoker     Years: 30.00     Types: Cigarettes     Quit date: 2020     Years since quittin.7     Smokeless tobacco: Never Used   Substance Use Topics     Alcohol use: Not Currently             Family History:   History reviewed. No pertinent family history.             Allergies:     Allergies    Allergen Reactions     Blood Transfusion Related (Informational Only) Other (See Comments)     Patient has a history of a clinically significant antibody against RBC antigens.  A delay in compatible RBCs may occur.              Medications:     Current Facility-Administered Medications   Medication     [Held by provider] acetaminophen (TYLENOL) tablet 650 mg     [Held by provider] acetaminophen (TYLENOL) tablet 975 mg     acetylcysteine (MUCOMYST) 20 % nebulizer solution 2 mL     [Held by provider] aspirin (ASA) chewable tablet 81 mg     benzocaine-menthol (CEPACOL) 15-3.6 MG lozenge 1 lozenge     calcium carbonate (TUMS) chewable tablet 500-1,000 mg     calcium carbonate 600 mg-vitamin D 400 units (CALTRATE) per tablet 1 tablet     dapsone (ACZONE) tablet 50 mg     dextrose 10% BOLUS     dextrose 10% infusion     glucose gel 15-30 g    Or     dextrose 50 % injection 25-50 mL    Or     glucagon injection 1 mg     glucose gel 15-30 g    Or     dextrose 50 % injection 25-50 mL    Or     glucagon injection 1 mg     fludrocortisone (FLORINEF) tablet 0.1 mg     heparin infusion 25,000 units in D5W 250 mL ANTICOAGULANT     hydrOXYzine (ATARAX) tablet 25 mg    Or     hydrOXYzine (ATARAX) tablet 50 mg     insulin aspart (NovoLOG) injection (RAPID ACTING)     [Held by provider] insulin glargine (LANTUS PEN) injection 7 Units     iopamidol 76% (ISOVUE 370) + NaCl 0.9%     levalbuterol (XOPENEX) neb solution 1.25 mg     Lidocaine (LIDOCARE) 4 % Patch 2 patch     lidocaine patch in PLACE     magnesium hydroxide (MILK OF MAGNESIA) suspension 30 mL     metoprolol tartrate (LOPRESSOR) tablet 50 mg     multivitamin, therapeutic (THERA-VIT) tablet 1 tablet     mycophenolate (CELLCEPT BRAND) suspension 750 mg     naloxone (NARCAN) injection 0.2 mg    Or     naloxone (NARCAN) injection 0.4 mg    Or     naloxone (NARCAN) injection 0.2 mg    Or     naloxone (NARCAN) injection 0.4 mg     nystatin (MYCOSTATIN) cream     nystatin  (MYCOSTATIN) suspension 1,000,000 Units     ondansetron (ZOFRAN ODT) ODT tab 4 mg     oxyCODONE (ROXICODONE) tablet 5-10 mg     pantoprazole (PROTONIX) 2 mg/mL suspension 40 mg     predniSONE (DELTASONE) tablet 12.5 mg    And     predniSONE (DELTASONE) tablet 10 mg     prochlorperazine (COMPAZINE) tablet 10 mg     protein modular (PROSOURCE TF) 1 packet     simethicone (MYLICON) suspension 40 mg     sodium chloride (PF) 0.9% PF flush 10 mL     sodium chloride (PF) 0.9% PF flush 10-20 mL     sodium chloride (PF) 0.9% PF flush 10-20 mL     sodium chloride (PF) 0.9% PF flush 3 mL     tacrolimus (GENERIC EQUIVALENT) suspension 4 mg     valGANciclovir (VALCYTE) solution 450 mg               Review of Systems:   A complete review of systems was obtained and was negative except as stated above in the HPI            Physical Exam:   All vitals have been reviewed  Temp:  [98.3  F (36.8  C)-99  F (37.2  C)] 98.7  F (37.1  C)  Pulse:  [] 101  Resp:  [14-21] 16  BP: (113-137)/(65-85) 130/79  SpO2:  [98 %-100 %] 99 %    Intake/Output Summary (Last 24 hours) at 8/12/2022 1649  Last data filed at 8/12/2022 1200  Gross per 24 hour   Intake 205 ml   Output 900 ml   Net -695 ml     Physical Exam:  Gen: NAD  HEENT: Normocephalic, atraumatic  CV: RRR, on supplemental O2  Abdomen: soft, mildly tender in RUQ, no guarding, no rebound, healing chest tube sites, G tube in place  Ext: WWP  Skin: No erythema  Neuro: cranial nerves II-XII grossly intact  Psych: Alert, appropriate            Data:   All laboratory data reviewed    Results:  BMP  Recent Labs   Lab 08/12/22  1601 08/12/22  1158 08/12/22  0835 08/12/22  0736 08/11/22  1016 08/11/22  0941 08/11/22  0747 08/11/22  0610 08/10/22  2314 08/10/22  2016 08/10/22  0650 08/10/22  0453 08/09/22  0742 08/09/22  0548   NA  --   --   --  139  --   --   --  138  --   --   --  138  --  136   POTASSIUM  --   --   --  5.2  --  4.5  --  5.4*  --  5.1   < > 5.7*   < > 5.5*  5.5*   CHLORIDE   --   --   --  99  --   --   --  97*  --   --   --  97*  --  95*   CO2  --   --   --  35*  --   --   --  37*  --   --   --  38*  --  39*   BUN  --   --   --  42.9*  --   --   --  49.5*  --   --   --  46.0*  --  55.6*   CR  --   --   --  1.42*  --   --   --  1.50*  --   --   --  1.36*  --  1.41*   * 144* 129* 133*   < >  --    < > 124*   < >  --    < > 111*   < > 219*    < > = values in this interval not displayed.     CBC  Recent Labs   Lab 08/12/22  0955 08/11/22  2339 08/11/22  1657 08/11/22  0610 08/10/22  0453   WBC 7.4  --  10.2 7.8 8.3   HGB 9.1* 8.1* 8.4* 7.9* 8.4*     --  342 322 287     LFT  Recent Labs   Lab 08/12/22  0736 08/11/22  1810 08/11/22  0610 08/09/22  0548 08/08/22  1630 08/08/22  0740   AST 15  --  19 235*  --  1,143*   *  --  215* 601*  --  791*   ALKPHOS 344*  --  412* 644*  --  861*   BILITOTAL 0.2  --  0.2 0.3  --  0.9   ALBUMIN 2.6*  --  2.4* 2.5*  --  2.5*   INR  --  0.95  --  0.95 1.07  --      Recent Labs   Lab 08/12/22  1601 08/12/22  1158 08/12/22  0835 08/12/22  0736 08/12/22  0406 08/12/22  0044   * 144* 129* 133* 135* 116*       Imaging:  Exam: MR ABDOMEN MRCP W/O & W CONTRAST, 8/10/2022 7:59 AM     Indication: Extra-hepatic mass seen on US     Comparison: 8/8/2022, 7/15/2010     Technique: Images were acquired with and without intravenous  gadolinium contrast through the upper abdomen. The following MR images  were acquired without intravenous contrast: TrueFISP, multiplanar  T2-weighted, axial T1 in/out of phase, T2-weighted MRCP images, axial  diffusion-weighted and axial apparent diffusion coefficient.  T1-weighted images were obtained before contrast at the multiple time  points following contrast injection. 3-D reformatted images were  generated by the technologist. Contrast dose: 7.5mL Gadavist     FINDINGS:     Liver: Normal     Gallbladder/Bile Ducts: Slightly increased mild intrahepatic and  moderate extrahepatic biliary dilation with common  hepatic duct  measuring up to 21 mm (series 4, image 9) and the common bile duct  measuring up to 11 mm with level of transition at the gallbladder neck  which bulges medially into the duct. The gallbladder is hydropic and  contains moderate amount of T1 hyperintense material, which extends  into the bladder neck with folding of the gallbladder wall at the  transition between the neck and body. No filling defect within the  intra or extrahepatic biliary tree.     Spleen: Normal     Kidneys: Subcentimeter right renal cortical cysts.     Adrenal glands: Normal     Pancreas: No atrophy. Nondilated pancreatic duct.  Cystic foci in the pancreatic head measuring 4 x 3 mm superiorly and 4  x 3 mm inferiorly (series 15, image 48 and 50 respectively).     Bowel: Nondilated.  Percutaneous gastrojejunostomy tube with tip in the proximal jejunum  in the right mid abdomen.     Lymph nodes: No adenopathy     Blood vessels: Patent portal, splenic and superior mesenteric veins  without thrombus, Conventional hepatic arterial anatomy     Lung bases: Loculated bilateral pleural effusions. Basilar opacities.     Bones and soft tissues: No acute osseous abnormality     Mesentery and abdominal wall: No hernia     Ascites: Small volume                                                                      IMPRESSION:   1.  Slightly increased moderate extrahepatic and mild intrahepatic  biliary dilation with the common hepatic duct measuring up to 2.1 cm  and the common bile duct measuring up to 1.1 cm in diameter with  transition in the upper common bile duct at the level of the hydropic  gallbladder, which bulges medially. The gallbladder contains a  moderate amount of proteinaceous/hemorrhagic material, which extends  into the gallbladder neck, corresponding to the findings on comparison  ultrasound. Consider HIDA scan as clinically warranted to assess for  acute cholecystitis. There is no solid mass as clinically queried.  2.  Loculated  bilateral pleural effusions.  3.  Small volume ascites.  4.  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 Recommend follow-up as described below.     Greg Rocha MD

## 2022-08-12 NOTE — PROGRESS NOTES
Pulmonary Medicine  Cystic Fibrosis - Lung Transplant Team  Progress Note  2022       Patient: Sofie Rodriguez  MRN: [id number]  : 1960 (age 62 year old)  Transplant: 2022 (Lung), POD#45  Admission date: 2022    Assessment & Plan:     Sofie Rodriguez is a 62 year old female with a PMH significant for end-stage COPD, HTN, HFpEF, Mycobacterium peregrinum colonization, h/o hepatitis C, HECTOR s/p LEEP procedure, osteopenia, and former methamphetamine use.  Pt. is now s/p BSLT on 22, lungs slightly undersized.   Persistent low dose pressor needs post-op through 7/10.  Extubated POD #2 but with persistent hypercapnia, mostly compensated and only slightly improved with intermittent BiPAP, discontinued 8/3.  Also with bilateral pleural effusions, left radial artery thrombus (presumed secondary to arterial line) s/p thrombectomy , BACILIO, C diff, gastroparesis s/p GJ tube placement in IR , and coffee ground G tube output s/p EGD 8/3 with pyloric ulcer noted.  Hemoglobin drop with dark tarry stools .  Also with acute rise in LFTs , abdominal US with extrahepatic mass and MRCP with increase in biliary dilation.  S/p ERCP  with findings concerning for hemobilia.      Today's recommendations:  - DSA (8/10) pending  - CXR ordered tomorrow  - Consult IR vs IP for consideration of right sided thoracentesis with pleural cultures/studies  - Wean supplemental oxygen as able, discourage use if >92% SpO2, exercise and overnight oximetry needed prior to discharge  - BLE US prior to discharge (screening for thrombus)  - Tacrolimus level to trend therapeutic (missed evening dose yesterday), defer dose adjustment, daily levels ordered through 8/15  - Prednisone taper due  (not yet ordered)  - DSA ordered 8/15  - IgG  (not yet ordered)  - EBV  (not yet ordered)  - Follow GI and general surgery recommendations  - Obtain updated weight     S/p bilateral sequential lung transplant (BSLT) for  end stage COPD:  Persistent hypercapneic respiratory failure:   Persistent hypotension, Resolved:   Bilateral hydroPTX:  Right hemidiaphragm palsy: Explant pathology with severe emphysema with subpleural bullae formation, changes of chronic bronchitis, subpleural scars and patchy pulmonary edema; benign hilar lymph nodes.  Extubated 6/30.  Persistent hypercapnia without dyspnea, appears to be a respiratory drive problem.  Sniff (7/14) notable for right hemidiaphragm palsy.  Bronch (7/19) with slight graying of mucosa noted at right anastomosis and scant secretions (slightly increased in RLL).  Chest CT (7/23) with increased loculated fluid within the left hemithorax with specks of air density in the loculated fluid, similar appearing loculated right hydroPTX with minimal decrease in fluid component, increased size of pleural based lesion in MARLON, and mild subcutaneous emphysema along right chest tube with minimal along tract of removed left chest tube.  S/p left apical chest tube 7/25-8/4, right surgical tube removed 8/6.  Bronch (8/2) with normal inspection of anastomoses.  NIPPV initially overnight for persistent hypercapnia, stopped 8/3 given lack of improvement with therapy, compensated hypercapnia persists.  CTA abdomen (8/11) noted similar appearance of bilateral loculated moderate pleural effusions with adjacent compressive atelectasis and LL predominant interlobular septal thickening.    - DSA (8/10) pending  - Nebs: levalbuterol and Mucomyst BID   - Aerobika and incentive spirometry hourly while awake  - CXR monitoring every other day (ordered 8/13)  - Consult IR vs IP for consideration of right sided thoracentesis with pleural cultures/studies per review of CTA abdomen with Dr. Castillo  - Wean supplemental oxygen as able, discourage use if >92% SpO2, exercise and overnight oximetry needed prior to discharge  - Will need BLE US prior to discharge (screening for thrombus)     Immunosuppression:  Induction  therapy with basiliximab (and high dose IV steroid).  - Tacrolimus 4 mg BID (decreased 8/12, missed evening dose 8/11, via J tube).  Goal level 8-12.  Level 8/12 to trend therapeutic at 8.2, defer dose adjustment, daily level through 8/15 (ordered).  -  mg BID (increased 8/7, prior decrease for GI symptoms/leukopenia)   - Prednisone 12.5 mg qAM / 10 mg qPM, next taper due 8/18 (not yet ordered)  Date AM dose (mg) PM dose (mg)   8/4/22 12.5 10   8/18/22 10 10   9/15/22 10 7.5   10/13/22 7.5 7.5   11/10/22 7.5 5   12/8/22 5 5   1/5/23 5 2.5      Prophylaxis:   - Dapsone qMWF for PJP ppx (7/18)  - VGCV for CMV ppx, CMV negative 8/8  - Nystatin for oral candidiasis ppx, 6 month course  - See below for serologies and viral ppx:    Donor Recipient Intervention   CMV status Positive Positive Valganciclovir POD #8-90   EBV status Positive Positive EBV monitoring as below   HSV status N/A Positive Not indicated     Positive DSA: Newly positive DSA on 8/10, DQB2 with mfi 2155.   - Repeat DSA 8/15 (ordered)    ID: Donor bronch cultures (OSH) with Strep beta hemolytic (not group A).     H/o M. peregrinum colonization: NGTD post-transplant.  - AFB to be sent on all future bronchs     Streptococcus pneumoniae:  Stenotrophomonas maltophilia: Noted in recipient cultures at time of transplant.  S/p ceftazidime 6/28-7/10, vancomycin 7/7-7/8 and 6/28-6/30, and levofloxacin 7/10-7/12 for total 2 week ABX course.     Hypogammaglobulinemia: IgG adequate at time of transplant, repeat level low (336) on 7/28.  S/p IVIG dosing with premedication 7/30, tolerated well.  IgG on 8/10 low but stable at 590, replacement not indicated.  - Repeat IgG 8/30 (not yet ordered)     H/o hepatitis C:  Diagnosed in 1980s s/p 2m treatment, quant negative since 10/2017, last positive 2/20/17 ([phone]).  Ab positive on 6/2021 with negative HCV PCR.  H/o remote EtOH abuse.  MR elastography (4/27/21) with hepatology review and consult without any  concerns post transplant.  Hepatitis C RNA negative and hepatitis C antibody positive (old) on 6/28.  Repeat hepatitis C RNA negative 8/8 in setting of elevated LFTs as below.     EBV viremia: EBV level 11k, log 4.1 on 7/28.  Not likely to be clinically significant.  Repeat EBV (in the setting of elevated LFTs) decreased to 1,501, log 3.2 (8/8).  - EBV monthly monitoring (9/8, not yet ordered)     Other relevant problems managed by primary team:      SVT:   Aflutter with RVR: SVT first noted on 7/14, prior to HD line placement.  Continues intermittently.  Aflutter with RVR to 200 7/17 triggered by activity.  EP consulted 7/17 given persistent tachycardia/dysrhythmia.  Midodrine held 7/19 and since discontinued.  AC and metoprolol per primary team.     Left hand ischemia: Radial artery thrombosis identified on duplex doppler.  S/p thrombectomy on 7/2.  AC per primary team as above.      BACILIO:   Hyperkalemia: Likely multifactorial including medications (Bactrim, tacrolimus) and hypotension.  Fludrocortisone 7/6-7/11.  Potassium had been stable, now elevated to 5.5-5.7 since 8/8.  S/p non-tunneled HD line 7/14.  Initial iHD run 7/14 limited by afib with RVR vs SVT.  Last iHD run 7/16, line removed 7/22.  Creatinine increased since 7/29 with Diamox and elevated tacrolimus level.  Transitioned to low potassium TF formula 8/8 although intermittently held.  - Tacrolimus monitoring as above  - Florinef (started 8/9, increased 8/12)  - Volume management per primary team     Elevated LFTs:   Extrahepatic and intrahepatic biliary dilation: Acute rise in LFTs (alk phos 861, , AST 1,143 and bilirubin 0.9) on 8/8 (prior normal).  Also in setting of increased and different abdominal pain as below.  Amylase low, lipase normal.  Abdominal US 8/8 with extrahepatic mass at level of common bile duct with associated intrahepatic and common bile duct dilation, patent hepatic vasculature, and layering gallbladder sludge.  Concern  for infection vs malignancy vs fluid collection.  MRCP (8/9) with slight increase in moderate biliary dilation and gall bladder with moderate protein/hemorrhagic material.  Also noted to have cystic foci in the pancreatic head (most consistent with IPMN).  S/p ERCP (8/11) with findings concerning for hemobilia, stent placed across duodenum and in CBD.  CTA abdomen (8/11) without evidence of acute GI bleed.  GI team concerned bleeding originating from gallbladder (see note 8/12 for details).  - LFTs daily  - GI team recommending general surgery consult for possible cholecystectomy (sent message to update CVTS team)  - Will need repeat abdominal CT in 6 months for pancreatic findings     Severe gastroparesis:   Pyloric ulcer: Cramping abdominal pain 7/15.  AXR with large stool burden in colon, no obstruction or distention.  Some nausea but no emesis.  CT abdomen (7/15) with moderate to large gastric distention, otherwise without obstruction.  NG placed for LIS with moderate to large output for several days.  Increased abdominal pain 7/17 after clamping for 4 hours.  Gastric emptying study (7/20) with severe gastric emptying delay (95% retention at 4 hours).  S/p GJ tube placement in IR 7/27.  S/p EGD 8/3 for coffee ground G tube output, noted to have pyloric ulcer and excessive gastric fluid and residual food.  Increased abdominal pain/cramping with trial of cycled TF 8/7 to 8/8.  AXR 8/8 without evidence of peritoneal free air or abnormally dilated loops of bowel.  Hemoglobin drop with dark tarry stools 8/8.  S/p repeat EGD (8/11) with mild erythema 2/2 GJ tube trauma seen near insertion site but no active bleeding.  - PPI BID  - Simethicone prn  - TF via J tube, management per RD, GI, and primary team  - G tube to gravity drainage   - Strict NPO except for ice chips and sips for now  - Will need repeat EGD in 8 weeks to check healing of ulcer     C diff infection: Abdominal pain with diarrhea noted 7/8, AXR without  dilated bowel, moderate colonic stool burden.  C diff positive 7/11.  Loose stools (on tube feeds) stable.  S/p PO vancomycin (7/11-7/28).  Repeat C diff negative 8/6.  - Low threshold to resume vancomycin in the future with ABX course     Hypomagnesemia: Suppressed absorption d/t CNI.   - Continue daily magnesium with replacement protocol prn, schedule oral magnesium supplementation if needed prior to discharge    We appreciate the excellent care provided by the [place] [id number] team.  Recommendations communicated via in person rounding and this note.  Will continue to follow along closely, please do not hesitate to call with any questions or concerns.    Patient discussed with Dr. Castillo.    SHYAM GarciaC  Inpatient EMILY  Pulmonary CF/Transplant     Subjective & Interval History:     Remains on 1L NC, able to tolerate RA for brief period of time this morning then felt a little dyspneic.  Cough productive of minimal clear sputum.  Underwent EGD and ERCP yesterday, increased abdominal pain following procedure and now back down to 4/10 this morning.  Denies nausea or vomiting.  Had BM overnight and on commode this morning.      Review of Systems:     C: No fever, no chills, no change in weight, no change in appetite  INTEGUMENTARY/SKIN: No rash or obvious new lesions  ENT/MOUTH: No sore throat, no sinus pain, no nasal congestion or drainage  RESP: See interval history  CV: No chest pain, no palpitations, no peripheral edema, no orthopnea  GI: No nausea, no vomiting, no change in stools, no reflux symptoms  : No dysuria  MUSCULOSKELETAL: No myalgias, no arthralgias  ENDOCRINE: Blood sugars with adequate control  NEURO: No headache, no numbness or tingling  PSYCHIATRIC: Mood stable    Physical Exam:     All notes, images, and labs from past 24 hours (at minimum) were reviewed.    Vital signs:  Temp: 98.4  F (36.9  C) Temp src: Oral BP: 128/71 Pulse: 98   Resp: 14 SpO2: 100 % O2 Device: Nasal cannula Oxygen  "Delivery: 1 LPM Height: 157.5 cm (5' 2\") Weight: 67.9 kg (149 lb 9.6 oz)  I/O:     Intake/Output Summary (Last 24 hours) at 8/12/2022 0936  Last data filed at 8/12/2022 0730  Gross per 24 hour   Intake 1155 ml   Output 800 ml   Net 355 ml     Constitutional: Sitting up on commode, in no apparent distress.   HEENT: Eyes with pink conjunctivae, anicteric.  Oral mucosa moist without lesions.    PULM: Diminished air flow to left > right base.  Faint LLL crackles.  No rhonchi, no wheezes.  Non-labored breathing on 1L NC.  CV: Normal S1 and S2.  RRR.  No peripheral edema.   ABD: NABS, soft, nondistended.  GJ tube site not visualized.  MSK: Moves all extremities.  NEURO: Alert, conversant.   SKIN: Warm, dry.  No rash on limited exam.  Lateral edges of clamshell incision visualized and intact.   PSYCH: Mood stable.     Lines, Drains, and Devices:  Midline Catheter Double Lumen (Active)   Site Assessment WDL 08/11/22 2030   External Cath Length (cm) 2 cm 08/11/22 1112   Initial Extremity Circumference (cm) 29 cm 07/28/22 1455   Dressing Intervention Chlorhexidine patch;Securing device;New dressing 08/11/22 1112   Line Necessity Yes, meets criteria 08/11/22 2030   Dressing Change Due 08/18/22 08/11/22 1112   Purple - Status saline locked 08/11/22 2030   Purple - Cap Change Due 08/12/22 08/11/22 0400   Red - Status saline locked 08/11/22 2030   Red - Cap Change Due 08/12/22 08/11/22 0400   Extravasation? No 08/11/22 2030   Number of days: 15     Data:     LABS    CMP:   Recent Labs   Lab 08/12/22  0835 08/12/22  0736 08/12/22  0406 08/12/22  0044 08/11/22  1016 08/11/22  0941 08/11/22  0747 08/11/22  0610 08/10/22  2314 08/10/22  2016 08/10/22  0650 08/10/22  0453 08/09/22  0742 08/09/22  0548 08/08/22  0916 08/08/22  0740   NA  --  139  --   --   --   --   --  138  --   --   --  138  --  136  --  142   POTASSIUM  --  5.2  --   --   --  4.5  --  5.4*  --  5.1   < > 5.7*   < > 5.5*  5.5*   < > 5.5*   CHLORIDE  --  99  --   --  "  --   --   --  97*  --   --   --  97*  --  95*  --  99   CO2  --  35*  --   --   --   --   --  37*  --   --   --  38*  --  39*  --  41*   ANIONGAP  --  5*  --   --   --   --   --  4*  --   --   --  3*  --  2*  --  2*   * 133* 135* 116*   < >  --    < > 124*   < >  --    < > 111*   < > 219*   < > 116*   BUN  --  42.9*  --   --   --   --   --  49.5*  --   --   --  46.0*  --  55.6*  --  63.9*   CR  --  1.42*  --   --   --   --   --  1.50*  --   --   --  1.36*  --  1.41*  --  1.23*   GFRESTIMATED  --  42*  --   --   --   --   --  39*  --   --   --  44*  --  42*  --  50*   ESTUARDO  --  8.7*  --   --   --   --   --  8.9  --   --   --  9.0  --  8.9  --  8.9   MAG  --  2.1  --   --   --   --   --   --   --   --   --  2.7*  --  2.4*  --  2.5*   PHOS  --  3.9  --   --   --   --   --   --   --   --   --  4.1  --  3.8  --  3.2   PROTTOTAL  --  5.0*  --   --   --   --   --  4.9*  --   --   --   --   --  4.8*  --  5.0*   ALBUMIN  --  2.6*  --   --   --   --   --  2.4*  --   --   --   --   --  2.5*  --  2.5*   BILITOTAL  --  0.2  --   --   --   --   --  0.2  --   --   --   --   --  0.3  --  0.9   ALKPHOS  --  344*  --   --   --   --   --  412*  --   --   --   --   --  644*  --  861*   AST  --  15  --   --   --   --   --  19  --   --   --   --   --  235*  --  1,143*   ALT  --  147*  --   --   --   --   --  215*  --   --   --   --   --  601*  --  791*    < > = values in this interval not displayed.     CBC:   Recent Labs   Lab 08/11/22  2339 08/11/22  1657 08/11/22  0610 08/10/22  0453 08/09/22  2213 08/09/22  0548   WBC  --  10.2 7.8 8.3  --  6.3   RBC  --  2.76* 2.61* 2.78*  --  2.17*   HGB 8.1* 8.4* 7.9* 8.4*   < > 6.5*   HCT  --  26.8* 25.3* 27.2*  --  21.7*   MCV  --  97 97 98  --  100   MCH  --  30.4 30.3 30.2  --  30.0   MCHC  --  31.3* 31.2* 30.9*  --  30.0*   RDW  --  16.6* 16.8* 17.4*  --  17.8*   PLT  --  342 322 287  --  239    < > = values in this interval not displayed.       INR:   Recent Labs   Lab 08/11/22  1810  08/09/22  0548 08/08/22  1630   INR 0.95 0.95 1.07       Glucose:   Recent Labs   Lab 08/12/22  0835 08/12/22  0736 08/12/22  0406 08/12/22  0044 08/11/22  2050 08/11/22  1652   * 133* 135* 116* 148* 237*       Blood Gas: No lab results found in last 7 days.    Culture Data No results for input(s): CULT in the last 168 hours.    Virology Data:   Lab Results   Component Value Date    FLUAH1 Not Detected 06/29/2022    FLUAH3 Not Detected 06/29/2022    CG4327 Not Detected 06/29/2022    IFLUB Not Detected 06/29/2022    RSVA Not Detected 06/29/2022    RSVB Not Detected 06/29/2022    PIV1 Not Detected 06/29/2022    PIV2 Not Detected 06/29/2022    PIV3 Not Detected 06/29/2022    HMPV Not Detected 06/29/2022       Historical CMV results (last 3 of prior testing):  Lab Results   Component Value Date    CMVQNT Not Detected 08/08/2022    CMVQNT Not Detected 07/25/2022     No results found for: CMVLOG    Urine Studies    Recent Labs   Lab Test 08/01/22  0319 07/08/22  0831 07/05/22  1004   URINEPH 8.0* 5.5 5.5   NITRITE Negative Negative Negative   LEUKEST Negative Negative Negative   WBCU  --  1 5       Most Recent [place] Pulmonary Function Testing (FVC/FEV1 only)  FVC-Pre   Date Value Ref Range Status   04/29/2022 1.82 L    11/11/2021 2.17 L    06/14/2021 2.00 L      FVC-%Pred-Pre   Date Value Ref Range Status   04/29/2022 58 %    11/11/2021 70 %    06/14/2021 64 %      FEV1-Pre   Date Value Ref Range Status   04/29/2022 0.51 L    11/11/2021 0.53 L    06/14/2021 0.54 L      FEV1-%Pred-Pre   Date Value Ref Range Status   04/29/2022 20 %    11/11/2021 21 %    06/14/2021 21 %        IMAGING    Recent Results (from the past 48 hour(s))   XR Chest 2 Views    Narrative    PA and lateral chest    HISTORY: Interval follow-up lung transplant pleural effusions    COMPARISON STUDY: 8/9/2022    FINDINGS: Cardiac silhouette is not enlarged. Bilateral loculated  pleural effusions tracking into the apices, bilateral fissures  and  left lower pleural space is again noted. Left axillary PICC. Clamshell  sternotomy wires from bilateral transplants.      Impression    IMPRESSION: No significant change in loculated pleural effusions  bilaterally    JOHANN MORAES MD         SYSTEM ID:  [id number]   XR Surgery LARISA Fluoro Less Than 5 Min w Stills    Narrative    This exam was marked as non-reportable because it will not be read by a   radiologist or a [place] non-radiologist provider.         CTA Abdomen Pelvis with Contrast    Narrative    EXAMINATION: CTA ABDOMEN PELVIS WITH CONTRAST, 8/11/2022 8:44 PM    TECHNIQUE:  Helical CT images from the lung bases through the pubic  symphysis were obtained  with IV contrast.     COMPARISON: Body MR 8/9/2022. Abdominal ultrasound 8/8/2022    HISTORY: hemobilia found on ERCP with stent placement    FINDINGS:  Lung bases:  Similar appearance of bilateral loculated moderate pleural effusions  with adjacent compressive atelectasis and lower lobe predominant  interlobular septal thickening suggestive of pulmonary edema.    Abdomen and pelvis:   There is unremarkable. No focal enhancing lesion. Peripheral  ill-defined areas of enhancement likely secondary to transient hepatic  attenuation differences. No intra or extrahepatic biliary duct  dilation. Spleen size is within normal limits. Postsurgical  cholecystectomy changes. Common bile duct stent and pancreatic duct  stents with small volume pneumobilia. The main pancreatic duct is not  dilated. Homogenous enhancement of the pancreas.    Adrenals are within normal limits. Symmetric renal enhancement. No  hydronephrosis, calcified stone, contour deforming mass. Bladder is  distended and normal in appearance. Bladder is unremarkable.    Percutaneous jejunostomy tube with tip terminating in the proximal  jejunum. No small or large bowel wall thickening or dilation. The  appendix is not well-visualized, however there are no significant  inflammatory changes in  the right lower quadrant. No free air. No  pneumatosis or portal venous gas. Small volume ascites.    Abdominal aorta measure branches are normal in caliber and patent with  moderate predominantly aortoiliac calcifications.    No mesenteric, retroperitoneal, or pelvic lymphadenopathy.    Bones and soft tissues: No suspicious or acute osseous lesion. Soft  tissues are unremarkable.    1.      Impression    IMPRESSION:   2.  Interval placement of common bile and pancreatic duct stents with  small volume pneumobilia. No evidence for acute GI bleed. Contrast  visualized in the distal small bowel from same day ERCP.  3.  No acute findings in the abdomen or pelvis. Small volume free  pelvic fluid.  4.  Similar appearance of bilateral loculated moderate pleural  effusions with adjacent compressive atelectasis and interlobular  septal thickening suggestive for pulmonary edema.    I have personally reviewed the examination and initial interpretation  and I agree with the findings.    JOHANN MORAES MD         SYSTEM ID:  [id number]

## 2022-08-12 NOTE — PROGRESS NOTES
Major Shift Events:  Pt. Had bedside thoracentesis, 100cc removed. Heparin drip restarted briefly and then paused again. Tube feeds at 10cc, per med team can advance slowly as tolerated, pt. Is wishing to not advance at this time. Cleared for PO intake, started on clear liquid diet and tolerated broth and water. Some c/o pain today, prn medication given.   Plan: continue current plan  For vital signs and complete assessments, please see documentation flowsheets.

## 2022-08-12 NOTE — PROCEDURES
INTERVENTIONAL PULMONOLOGY       Procedure(s):    Thoracentesis (right chest)  Ultrasound guidance     Indication:  Pleural effusion    Attending of Record:  Mason Reddy MD    Interventional Pulmonary Fellow   Kristen Ogden MD     Trainees Present:   None     Medications:    8 ml 1% lidocaine    Sedation Time:   None    Time Out:  Performed    The patient's medical record has been reviewed.  The indication for the procedure was reviewed.  The necessary history and physical examination was performed and reviewed.  The risks, benefits and alternatives of the procedure were discussed with the the patient in detail and she had the opportunity to ask questions.  I discussed in particular the potential complications including risks of minor or life-threatening bleeding and/or infection, respiratory failure, vocal cord trauma / paralysis, pneumothorax, and discomfort. Sedation risks were also discussed including abnormal heart rhythms, low blood pressure, and respiratory failure. All questions were answered to the best of my ability.  Verbal and written informed consent was obtained.  The proposed procedure and the patient's identification were verified prior to the procedure by the physician and the nurse.    The patient was assessed for the adequacy for the procedure and to receive medications.   Mental Status:  Alert and oriented x 3  Airway examination:  Class I (Complete visualization of soft palate)  Pulmonary:  Non labored respirations  CV:  Regular rate  ASA Grade:  (II)  Mild systemic disease    After clinical evaluation and reviewing the indication, risks, alternatives and benefits of the procedure the patient was deemed to be in satisfactory condition to undergo the procedure.      Immediately before administration of medications the patient was re-assessed for adequacy to receive sedatives including the heart rate, respiratory rate, mental status, oxygen saturation, blood pressure and adequacy of  pulmonary ventilation. These same parameters were continuously monitored throughout the procedure.    A Tuberculosis risk assessment was performed:  The patient has no known RISK of Tuberculosis    The procedure was performed in a negative airflow room: The patient could not be moved to a negative airflow room because of critical illness and instability    Maneuvers / Procedure:      Thoracentesis: Once the site was marked using US, A 8F Arrow thoracentesis catheter was used to aspirate fluid. The patient's right chest was prepped in sterile fashion. A total of 8 cc 1%lidocaine was used to anesthetize the area. A finder needle was used to aspirate fluid. The tube was then advanced into the pleural space while aspirating pleural fluid. The fluid was straw colored in color/consistency. A total of 100cc fluid was removed. During suctioning with the final syringe, air in the from of bubbles were noted in the syringe. Procedure was stopped. Chest x-ray is pending at the time of his note. Fluid was sent to micro and cytology for analysis     Any disposable equipment was visually inspected and deemed to be intact immediately post procedure.      Recommendations:     1. Follow up chest x-ray  2. Follow up pleural fluid studies.       Neftali Ogden  Interventional pulmonary fellow  Pager: [phone]

## 2022-08-12 NOTE — PROGRESS NOTES
Brief GI luminal note:     ERCP yesterday showed concern for hemobilia. Panc/bili team seeing patient and recommending general surgery consult. No evidence of bleeding from previously seen ulcer in pyloric channel.       Regarding outpatient GI follow up plan:   - She will need repeat upper endoscopy in 8 weeks (from 8/3 procedure) to check healing of ulcer. Pending clinical picture and endoscopic findings may need to consider pyloric dilation if stricture develops from current inflammation.     - I have placed outpatient GI referral within the discharge order navigator for repeated EGD in 8 weeks   - I have messaged GI schedulers to help arrange this procedure.     Luminal GI team will sign off at this time. Please reach out to GI team with questions or concerns.     Tenzin Arroyo MD  Gastroenterology Fellow - PGY4  [facility]   Page Me

## 2022-08-12 NOTE — PROGRESS NOTES
[facility]    Medicine Progress Note - Hospitalist Service, GOLD TEAM 10    Date of Admission:  6/28/2022    Assessment & Plan    Sofie Rodriguez is a 62 year old female admitted on 6/28/2022. She has PMH of end stage COPD s/p b/l lung transplant on 6/28/2022, HTN, HFpEF, h/o hepC, osteopenia, HECTOR s/p LEEP, and former methamphetamine use, and [facility] [address] [facility]. She was admitted to the MICU on 7/13/20222 with prior hospital course complicated by left upper extremity acute limb ischemia s/p left radial thrombectomy on 7/1/2022. She was primarily treated for acute hypercapnic respiratory failure on intermittent BIPAP with worsening BACILIO while in the MICU. Breathing has improved, O2 requirements have decreased/stabilized, but patient has severely delayed gastric emptying found on NM study. PEGJ placed. Chest tubes out. Still needing to advance feeds. GI following, s/p abdominal MRI which revealed protinaceous /hemmorhagic GB cyst. She is s/p ERCP and EGD 8/11/22, which revealed hemobilia and some blood in 2nd and 3d portion of the duodenum w/o active source of bleeding, GI placed stent into CBD. Follow up CT abdomen did not reveal acute GI bleed.     Today's Plan:  -S/p EGD and ERCP  -Hep gtt on hold for R thoracentesis, can restart after procedure   -General surgery consult for possible cholecystectomy  - Off enteric precautions  - Resume TFs, oral diet as tolerated with supplemental TF as indicated to meet caloric goal  - PO PPI      Hemobilia s/p ERCP and CBD stent  Acute Hepatitis - improving  Dramatic rise in LFT's-- AST > ALT; ALT 700s, AST 1000s; bili rising 0.9 from 0.2. No fever. MRCP: gallbladder contains a moderate amount of proteinaceous/hemorrhagic material, which extended  into the gallbladder neck. Patient had ERCP and EGD on 8/11, no active bleeding of the pyloric channel ulcer was noted. However,  Panc/bili team did  notice large amount of blood drainage from CBD. D/p stent in CBD.   - General surgery consult  - Resume TFs and oral diet as tolerated (patient has severe gastroparesis)  - LFTs trending down, will stop checking.   - GI arranged outpatient follow up. Will need repeat endoscopy in 8 weeks.     End stage COPD s/p BSLT 6/28/2022  Acute hypercapnic hypoxic respiratory failure (improving)  EBV Viremia  likely 2/2 decreased central respiratory drive vs respiratory muscle weakness and some pulmonary edema / fluid Transplanted 6/28. formal SNIFF test was performed on 7/14 showed R hemidiaphragm palsy. Of note transplanted lungs were also considered small for body size and could contribute to decreased respiratory compensation for hypercarbia. VBG improving gradually  - oxycodone 5-10 mg q4h PRN  - encourage activity and getting up in bed; PT/OT participation  - encourage chest physiotherapy QID  - weaned off Bipap at night  Immunosuppression:  - Prednisone per pulm  - Tacrolimus per pulm  -  BID  Prophylaxis:  - CMV - Valgancyclovir  - Thrush - PO nysatin  - PJP dapsone started 7/18 at 50mg qMWF     Severe Gastroparesis   Gastric emptying study 7/20 showed delayed gastric emptying with retention of 95% of stomach contents after 4 hours; 7/27: PEGJ placed  - tolerating tube feeds via J  Feeding regimen: J tube feeds continuous-- transitioned formula to non-renal formula as her kidneys have improved/needs more volume, appreciate RD assistance as well     Peptic ulcer at the pyloric junction with acute blood loss anemia in the setting of acute on chronic anemia-- had acute blood loss anemia after transplant-- had stabilized. Gradual hgb downtrend, then after inspection bronch her G tube was hooked back up to gravity and large brown/black output seen. EGD on 8/3 showed pyloric junction ulcer and swelling causing gastric outlet objection. S/p EGD on 8/11 without active GI bleed of the ulcer at the pyloric channel  -GI  signed off, OP plan in place  -BID PPI PO  - Hgb stable    Pleural Effusion, Right and Left  Left chest tube removed 8/4  - Gen pulm consult for R thoracentesis, thora labs sent  - Hold hep gtt until after thoracentesis  - daily CXR     Hypotension, resolved  H/o HTN  H/o HFpEF  Likely vasoplegia post operatively. Last echo 7/7 normal EF with good LV function. S/p hydrocortisone 7/6-7/9 and fludrocortisone 7/6-7/11 without significant improvement.  - off midorine 7/19  - Holding PTA lasix 20mg daily-- diuresis intermittently    BACILIO-- recurrent, improved 8/1-- likely due to supratherapeutic tacrolimus  Hypermagnesemia  Hyperphosphatemia  Metabolic alkalosis  Baseline renal function was normal prior to surgery. New onset renal failure after transplant, likely multifactorial due to pre-renal hypotension, less likely nephrotoxic agents. Overall kidney function improving. Today, Cr fluctuating but BUN increasing, with unclear urine output (7/22).   - HD cath removed 7/22, trend metabolites    C.diff - vanco started 7/12, course completed  -rechecked due to diarrhea (8/5)-- negative on 8/6  -Plan to clean room when patient goes to EGD/ERCP on 8/11  -Can come off precautions after room is cleaned per Infection control    Tachyarrthymia  Paroxysmal afib  Paroxysmal aflutter/AT  SVT vs afib.Had an occurrence during HD on 7/14. Had afib with RVR to 200s on 7/17. EP consult placed.asmyptomatic and stable during episodes. Aflutter episode (7/17) with transfer. Vagal maneuver responsive at time. No recent tachyarrhythmia episodes (7/22).   - Per EP: patient has had episodes of possible flutter (vs AT with 2:1 conduction) and afib with RVR  - heparin drip -- eventual plan to transition to DOAC after PEGJ placement and chest tubes resolved (CHADS-VASc score of 2)  --HOLDING heparin drip 8/8 due to melena  - On telemetry  - On metoprolol tartrate 25mg BID-- increasing to 50 BID 8/7  - Discharge on ziopatch for 14 days with EP  follow up in 1-2 months after     Stress-induced/steroid induced hyperglycemia with intermittent hypoglycemia (8/3)  Has been controlled on 13-15 units of glargine BID  - on 7 units BID  8/5 since she is tapering steroid and has been NPO-- holding long acting given NPO/holding tube feeds  -- will continue to monitor her needs     Incidental IPMN  Found on MRCP 8/9/22. Cystic foci in the pancreatic head measuring 4 x 3 mm superiorly and 4  x 3 mm inferiorly. Per [facility] guidelines on IPMN:  - Follow up imaging in 6 months to 1 year, then lengthen interval to 2-3 years if no change       Diet: Tube feeds via PEGJ  DVT Prophylaxis: SCDs  Dong Catheter: Not present  Central Lines: PRESENT       Cardiac Monitoring: None  Code Status: Full Code      Disposition Plan     Expected Discharge Date: 08/15/2022    Discharge Delays: Other (Add Comment)  Destination: home  Discharge Comments: TCU,        The patient's care was discussed with the Bedside Nurse, Patient and pulm, GI luminal and GI hepatology and nutrition Consultant.    Kirby Voss,   Internal Medicine PGY-2    Discussed with Dr. Matthew    ______________________________________________________________________    Interval History   Feeling well generally. No abdominal pain after ERCP. Asking when she can have oral diet.    Data reviewed today: I reviewed all medications, new labs and imaging results over the last 24 hours. I personally reviewed the chest x-ray image(s) showing chest tubes are out; post surgical changes .    Physical Exam   Vital Signs: Temp: 98.4  F (36.9  C) Temp src: Oral BP: 128/71 Pulse: 98   Resp: 14 SpO2: 100 % O2 Device: Nasal cannula Oxygen Delivery: 1 LPM  Weight: 149 lbs 9.6 oz  Constitutional: Resting in bed.  Head: Normocephalic. Atraumatic.   EENT: No conjunctival injection or icterus. Nares patent. Moist mucous membranes.   Cardiovascular: Regular rate and rhythm. No murmurs, clicks, gallops or rubs. No edema  Respiratory:  Clear to auscultation without wheezes or crackles. Normal respiratory rate and effort.   Gastrointestinal: Abdomen mild but diffusely tender throughout. Bowel sounds active. G tube with very little output - some dark brown flakes.  Musculoskeletal: extremities normal- no gross deformities noted and normal muscle tone  Skin: no suspicious lesions, rashes, jaundice, or cyanosis   Psychiatric: mentation appears normal and affect normal/bright      Data   Recent Labs   Lab 08/12/22  0835 08/12/22  0736 08/12/22  0406 08/12/22  0044 08/11/22  2339 08/11/22  2050 08/11/22  1810 08/11/22  1657 08/11/22  1016 08/11/22  0941 08/11/22  0747 08/11/22  0610 08/10/22  0650 08/10/22  0453 08/09/22  0742 08/09/22  0548 08/08/22  1759 08/08/22  1630 08/08/22  0916 08/08/22  0740   WBC  --   --   --   --   --   --   --  10.2  --   --   --  7.8  --  8.3  --  6.3  --   --   --  4.6   HGB  --   --   --   --  8.1*  --   --  8.4*  --   --   --  7.9*  --  8.4*   < > 6.5*  --  7.3*  --  6.5*   MCV  --   --   --   --   --   --   --  97  --   --   --  97  --  98  --  100  --   --   --  101*   PLT  --   --   --   --   --   --   --  342  --   --   --  322  --  287  --  239  --   --   --  242   INR  --   --   --   --   --   --  0.95  --   --   --   --   --   --   --   --  0.95  --  1.07  --   --    NA  --  139  --   --   --   --   --   --   --   --   --  138  --  138  --  136  --   --   --  142   POTASSIUM  --  5.2  --   --   --   --   --   --   --  4.5  --  5.4*   < > 5.7*   < > 5.5*  5.5*   < > 6.1*  --  5.5*   CHLORIDE  --  99  --   --   --   --   --   --   --   --   --  97*  --  97*  --  95*  --   --   --  99   CO2  --  35*  --   --   --   --   --   --   --   --   --  37*  --  38*  --  39*  --   --   --  41*   BUN  --  42.9*  --   --   --   --   --   --   --   --   --  49.5*  --  46.0*  --  55.6*  --   --   --  63.9*   CR  --  1.42*  --   --   --   --   --   --   --   --   --  1.50*  --  1.36*  --  1.41*  --   --   --  1.23*   ANIONGAP  --   5*  --   --   --   --   --   --   --   --   --  4*  --  3*  --  2*  --   --   --  2*   ESTUARDO  --  8.7*  --   --   --   --   --   --   --   --   --  8.9  --  9.0  --  8.9  --   --   --  8.9   * 133* 135*   < >  --    < >  --   --    < >  --    < > 124*   < > 111*   < > 219*   < >  --    < > 116*   ALBUMIN  --  2.6*  --   --   --   --   --   --   --   --   --  2.4*  --   --   --  2.5*  --   --   --  2.5*   PROTTOTAL  --  5.0*  --   --   --   --   --   --   --   --   --  4.9*  --   --   --  4.8*  --   --   --  5.0*   BILITOTAL  --  0.2  --   --   --   --   --   --   --   --   --  0.2  --   --   --  0.3  --   --   --  0.9   ALKPHOS  --  344*  --   --   --   --   --   --   --   --   --  412*  --   --   --  644*  --   --   --  861*   ALT  --  147*  --   --   --   --   --   --   --   --   --  215*  --   --   --  601*  --   --   --  791*   AST  --  15  --   --   --   --   --   --   --   --   --  19  --   --   --  235*  --   --   --  1,143*   LIPASE  --   --   --   --   --   --   --   --   --   --   --   --   --   --   --   --   --   --   --  18    < > = values in this interval not displayed.     Recent Results (from the past 24 hour(s))   XR Chest 2 Views    Narrative    PA and lateral chest    HISTORY: Interval follow-up lung transplant pleural effusions    COMPARISON STUDY: 8/9/2022    FINDINGS: Cardiac silhouette is not enlarged. Bilateral loculated  pleural effusions tracking into the apices, bilateral fissures and  left lower pleural space is again noted. Left axillary PICC. Clamshell  sternotomy wires from bilateral transplants.      Impression    IMPRESSION: No significant change in loculated pleural effusions  bilaterally    JOHANN MORAES MD         SYSTEM ID:  [id number]   XR Surgery LARISA Fluoro Less Than 5 Min w Stills    Narrative    This exam was marked as non-reportable because it will not be read by a   radiologist or a [facility] non-radiologist provider.         CTA Abdomen Pelvis with Contrast     Narrative    EXAMINATION: CTA ABDOMEN PELVIS WITH CONTRAST, 8/11/2022 8:44 PM    TECHNIQUE:  Helical CT images from the lung bases through the pubic  symphysis were obtained  with IV contrast.     COMPARISON: Body MR 8/9/2022. Abdominal ultrasound 8/8/2022    HISTORY: hemobilia found on ERCP with stent placement    FINDINGS:  Lung bases:  Similar appearance of bilateral loculated moderate pleural effusions  with adjacent compressive atelectasis and lower lobe predominant  interlobular septal thickening suggestive of pulmonary edema.    Abdomen and pelvis:   There is unremarkable. No focal enhancing lesion. Peripheral  ill-defined areas of enhancement likely secondary to transient hepatic  attenuation differences. No intra or extrahepatic biliary duct  dilation. Spleen size is within normal limits. Postsurgical  cholecystectomy changes. Common bile duct stent and pancreatic duct  stents with small volume pneumobilia. The main pancreatic duct is not  dilated. Homogenous enhancement of the pancreas.    Adrenals are within normal limits. Symmetric renal enhancement. No  hydronephrosis, calcified stone, contour deforming mass. Bladder is  distended and normal in appearance. Bladder is unremarkable.    Percutaneous jejunostomy tube with tip terminating in the proximal  jejunum. No small or large bowel wall thickening or dilation. The  appendix is not well-visualized, however there are no significant  inflammatory changes in the right lower quadrant. No free air. No  pneumatosis or portal venous gas. Small volume ascites.    Abdominal aorta measure branches are normal in caliber and patent with  moderate predominantly aortoiliac calcifications.    No mesenteric, retroperitoneal, or pelvic lymphadenopathy.    Bones and soft tissues: No suspicious or acute osseous lesion. Soft  tissues are unremarkable.    1.      Impression    IMPRESSION:   2.  Interval placement of common bile and pancreatic duct stents with  small volume  pneumobilia. No evidence for acute GI bleed. Contrast  visualized in the distal small bowel from same day ERCP.  3.  No acute findings in the abdomen or pelvis. Small volume free  pelvic fluid.  4.  Similar appearance of bilateral loculated moderate pleural  effusions with adjacent compressive atelectasis and interlobular  septal thickening suggestive for pulmonary edema.    I have personally reviewed the examination and initial interpretation  and I agree with the findings.    JOHANN MORAES MD         SYSTEM ID:  [id number]     Medications     dextrose Stopped (07/15/22 1801)     [Held by provider] heparin Stopped (08/08/22 1645)       [Held by provider] acetaminophen  975 mg Per J Tube 2 times daily     acetylcysteine  2 mL Nebulization BID     [Held by provider] aspirin  81 mg Per J Tube Daily     calcium carbonate 600 mg-vitamin D 400 units  1 tablet Per J Tube BID w/meals     dapsone  50 mg Per J Tube Once per day on Mon Wed Fri     dextrose 10%  300 mL Intravenous Once     fludrocortisone  0.1 mg Per J Tube Daily     insulin aspart  1-12 Units Subcutaneous Q4H     [Held by provider] insulin glargine  7 Units Subcutaneous BID     levalbuterol  1.25 mg Nebulization 2 times daily     metoprolol tartrate  50 mg Per Feeding Tube BID     multivitamin, therapeutic  1 tablet Per Feeding Tube Daily     mycophenolate  750 mg Per J Tube BID     nystatin   Topical BID     nystatin  1,000,000 Units Swish & Swallow 4x Daily     ondansetron  4 mg Oral Daily     [Held by provider] pantoprazole  40 mg Per J Tube BID     pantoprazole (PROTONIX) IV  40 mg Intravenous BID     predniSONE  12.5 mg Per J Tube QAM    And     predniSONE  10 mg Per J Tube QPM     protein modular  1 packet Per Feeding Tube Daily     sodium chloride (PF)  10 mL Intracatheter Q8H     sodium chloride (PF)  10 mL Intracatheter Q8H     sodium chloride (PF)  3 mL Intracatheter Q8H     tacrolimus  4 mg Per J Tube BID IS     valGANciclovir  450 mg Oral or  Feeding Tube Once per day on Mon Wed Fri

## 2022-08-13 ENCOUNTER — APPOINTMENT (OUTPATIENT)
Dept: GENERAL RADIOLOGY | Facility: CLINIC | Age: 62
DRG: 007 | End: 2022-08-13
Attending: PHYSICIAN ASSISTANT
Payer: MEDICARE

## 2022-08-13 LAB
ALBUMIN SERPL BCG-MCNC: 2.8 G/DL (ref 3.5–5.2)
ALP SERPL-CCNC: 345 U/L (ref 35–104)
ALT SERPL W P-5'-P-CCNC: 120 U/L (ref 10–35)
ANION GAP SERPL CALCULATED.3IONS-SCNC: 8 MMOL/L (ref 7–15)
AST SERPL W P-5'-P-CCNC: 18 U/L (ref 10–35)
BASOPHILS # BLD AUTO: 0 10E3/UL (ref 0–0.2)
BASOPHILS NFR BLD AUTO: 0 %
BILIRUB SERPL-MCNC: 0.2 MG/DL
BUN SERPL-MCNC: 42.4 MG/DL (ref 8–23)
CALCIUM SERPL-MCNC: 9.1 MG/DL (ref 8.8–10.2)
CHLORIDE SERPL-SCNC: 100 MMOL/L (ref 98–107)
CREAT SERPL-MCNC: 1.46 MG/DL (ref 0.51–0.95)
DEPRECATED HCO3 PLAS-SCNC: 36 MMOL/L (ref 22–29)
EOSINOPHIL # BLD AUTO: 0 10E3/UL (ref 0–0.7)
EOSINOPHIL NFR BLD AUTO: 0 %
ERYTHROCYTE [DISTWIDTH] IN BLOOD BY AUTOMATED COUNT: 16.3 % (ref 10–15)
GFR SERPL CREATININE-BSD FRML MDRD: 41 ML/MIN/1.73M2
GLUCOSE BLDC GLUCOMTR-MCNC: 130 MG/DL (ref 70–99)
GLUCOSE BLDC GLUCOMTR-MCNC: 178 MG/DL (ref 70–99)
GLUCOSE BLDC GLUCOMTR-MCNC: 234 MG/DL (ref 70–99)
GLUCOSE BLDC GLUCOMTR-MCNC: 277 MG/DL (ref 70–99)
GLUCOSE SERPL-MCNC: 139 MG/DL (ref 70–99)
HCT VFR BLD AUTO: 30.4 % (ref 35–47)
HGB BLD-MCNC: 9.2 G/DL (ref 11.7–15.7)
IMM GRANULOCYTES # BLD: 0.3 10E3/UL
IMM GRANULOCYTES NFR BLD: 3 %
LYMPHOCYTES # BLD AUTO: 0.3 10E3/UL (ref 0.8–5.3)
LYMPHOCYTES NFR BLD AUTO: 4 %
MCH RBC QN AUTO: 30.2 PG (ref 26.5–33)
MCHC RBC AUTO-ENTMCNC: 30.3 G/DL (ref 31.5–36.5)
MCV RBC AUTO: 100 FL (ref 78–100)
MONOCYTES # BLD AUTO: 0.6 10E3/UL (ref 0–1.3)
MONOCYTES NFR BLD AUTO: 7 %
NEUTROPHILS # BLD AUTO: 6.7 10E3/UL (ref 1.6–8.3)
NEUTROPHILS NFR BLD AUTO: 86 %
NRBC # BLD AUTO: 0 10E3/UL
NRBC BLD AUTO-RTO: 0 /100
PLATELET # BLD AUTO: 422 10E3/UL (ref 150–450)
POTASSIUM SERPL-SCNC: 5.2 MMOL/L (ref 3.4–5.3)
PROT SERPL-MCNC: 5.6 G/DL (ref 6.4–8.3)
RBC # BLD AUTO: 3.05 10E6/UL (ref 3.8–5.2)
SODIUM SERPL-SCNC: 144 MMOL/L (ref 136–145)
TACROLIMUS BLD-MCNC: 41.3 UG/L (ref 5–15)
TME LAST DOSE: ABNORMAL H
TME LAST DOSE: ABNORMAL H
WBC # BLD AUTO: 7.9 10E3/UL (ref 4–11)

## 2022-08-13 PROCEDURE — 85049 AUTOMATED PLATELET COUNT: CPT

## 2022-08-13 PROCEDURE — 250N000013 HC RX MED GY IP 250 OP 250 PS 637: Performed by: SURGERY

## 2022-08-13 PROCEDURE — 250N000013 HC RX MED GY IP 250 OP 250 PS 637: Performed by: NURSE PRACTITIONER

## 2022-08-13 PROCEDURE — 99233 SBSQ HOSP IP/OBS HIGH 50: CPT | Performed by: STUDENT IN AN ORGANIZED HEALTH CARE EDUCATION/TRAINING PROGRAM

## 2022-08-13 PROCEDURE — 71046 X-RAY EXAM CHEST 2 VIEWS: CPT

## 2022-08-13 PROCEDURE — 120N000002 HC R&B MED SURG/OB UMMC

## 2022-08-13 PROCEDURE — 99233 SBSQ HOSP IP/OBS HIGH 50: CPT | Mod: 24 | Performed by: PHYSICIAN ASSISTANT

## 2022-08-13 PROCEDURE — 250N000013 HC RX MED GY IP 250 OP 250 PS 637: Performed by: STUDENT IN AN ORGANIZED HEALTH CARE EDUCATION/TRAINING PROGRAM

## 2022-08-13 PROCEDURE — 36592 COLLECT BLOOD FROM PICC: CPT

## 2022-08-13 PROCEDURE — 94640 AIRWAY INHALATION TREATMENT: CPT

## 2022-08-13 PROCEDURE — 250N000012 HC RX MED GY IP 250 OP 636 PS 637: Performed by: NURSE PRACTITIONER

## 2022-08-13 PROCEDURE — 71046 X-RAY EXAM CHEST 2 VIEWS: CPT | Mod: 26 | Performed by: RADIOLOGY

## 2022-08-13 PROCEDURE — 80197 ASSAY OF TACROLIMUS: CPT | Performed by: PHYSICIAN ASSISTANT

## 2022-08-13 PROCEDURE — 94640 AIRWAY INHALATION TREATMENT: CPT | Mod: 76

## 2022-08-13 PROCEDURE — 80053 COMPREHEN METABOLIC PANEL: CPT

## 2022-08-13 PROCEDURE — 250N000009 HC RX 250: Performed by: NURSE PRACTITIONER

## 2022-08-13 PROCEDURE — 250N000012 HC RX MED GY IP 250 OP 636 PS 637: Performed by: INTERNAL MEDICINE

## 2022-08-13 PROCEDURE — 250N000013 HC RX MED GY IP 250 OP 250 PS 637: Performed by: HOSPITALIST

## 2022-08-13 PROCEDURE — 999N000157 HC STATISTIC RCP TIME EA 10 MIN

## 2022-08-13 PROCEDURE — 250N000013 HC RX MED GY IP 250 OP 250 PS 637: Performed by: INTERNAL MEDICINE

## 2022-08-13 PROCEDURE — 250N000012 HC RX MED GY IP 250 OP 636 PS 637: Performed by: PHYSICIAN ASSISTANT

## 2022-08-13 RX ADMIN — NYSTATIN: 100000 CREAM TOPICAL at 20:15

## 2022-08-13 RX ADMIN — MYCOPHENOLATE MOFETIL 750 MG: 200 POWDER, FOR SUSPENSION ORAL at 20:15

## 2022-08-13 RX ADMIN — LEVALBUTEROL HYDROCHLORIDE 1.25 MG: 1.25 SOLUTION RESPIRATORY (INHALATION) at 09:24

## 2022-08-13 RX ADMIN — ACETYLCYSTEINE 2 ML: 200 INHALANT RESPIRATORY (INHALATION) at 09:24

## 2022-08-13 RX ADMIN — OXYCODONE HYDROCHLORIDE 10 MG: 5 TABLET ORAL at 11:56

## 2022-08-13 RX ADMIN — OXYCODONE HYDROCHLORIDE 10 MG: 5 TABLET ORAL at 19:09

## 2022-08-13 RX ADMIN — Medication 40 MG: at 07:56

## 2022-08-13 RX ADMIN — LEVALBUTEROL HYDROCHLORIDE 1.25 MG: 1.25 SOLUTION RESPIRATORY (INHALATION) at 20:57

## 2022-08-13 RX ADMIN — ACETYLCYSTEINE 2 ML: 200 INHALANT RESPIRATORY (INHALATION) at 20:57

## 2022-08-13 RX ADMIN — METOPROLOL TARTRATE 50 MG: 50 TABLET, FILM COATED ORAL at 20:15

## 2022-08-13 RX ADMIN — INSULIN ASPART 2 UNITS: 100 INJECTION, SOLUTION INTRAVENOUS; SUBCUTANEOUS at 12:39

## 2022-08-13 RX ADMIN — OXYCODONE HYDROCHLORIDE 10 MG: 5 TABLET ORAL at 06:06

## 2022-08-13 RX ADMIN — OXYCODONE HYDROCHLORIDE 10 MG: 5 TABLET ORAL at 00:33

## 2022-08-13 RX ADMIN — Medication 40 MG: at 20:15

## 2022-08-13 RX ADMIN — Medication 40 MG: at 14:15

## 2022-08-13 RX ADMIN — THERA TABS 1 TABLET: TAB at 11:56

## 2022-08-13 RX ADMIN — MYCOPHENOLATE MOFETIL 750 MG: 200 POWDER, FOR SUSPENSION ORAL at 07:56

## 2022-08-13 RX ADMIN — TACROLIMUS 4 MG: 5 CAPSULE ORAL at 07:58

## 2022-08-13 RX ADMIN — OXYCODONE HYDROCHLORIDE 10 MG: 5 TABLET ORAL at 22:17

## 2022-08-13 RX ADMIN — CALCIUM CARBONATE 600 MG (1,500 MG)-VITAMIN D3 400 UNIT TABLET 1 TABLET: at 17:53

## 2022-08-13 RX ADMIN — PREDNISONE 12.5 MG: 2.5 TABLET ORAL at 07:57

## 2022-08-13 RX ADMIN — FLUDROCORTISONE ACETATE 0.1 MG: 0.1 TABLET ORAL at 07:57

## 2022-08-13 RX ADMIN — Medication 1 PACKET: at 12:04

## 2022-08-13 RX ADMIN — CALCIUM CARBONATE 600 MG (1,500 MG)-VITAMIN D3 400 UNIT TABLET 1 TABLET: at 07:57

## 2022-08-13 RX ADMIN — TACROLIMUS 4 MG: 5 CAPSULE ORAL at 17:53

## 2022-08-13 RX ADMIN — PREDNISONE 10 MG: 10 TABLET ORAL at 20:15

## 2022-08-13 RX ADMIN — INSULIN ASPART 4 UNITS: 100 INJECTION, SOLUTION INTRAVENOUS; SUBCUTANEOUS at 23:23

## 2022-08-13 RX ADMIN — INSULIN ASPART 6 UNITS: 100 INJECTION, SOLUTION INTRAVENOUS; SUBCUTANEOUS at 17:57

## 2022-08-13 RX ADMIN — METOPROLOL TARTRATE 50 MG: 50 TABLET, FILM COATED ORAL at 07:57

## 2022-08-13 ASSESSMENT — ACTIVITIES OF DAILY LIVING (ADL)
ADLS_ACUITY_SCORE: 30

## 2022-08-13 NOTE — PROGRESS NOTES
Brief Surgery Note    EGS Consulted for hemobilia, concern for malignancy, and consideration of cholecystectomy.     HPI: s/p lung transplant ~6weeks ago. Now with GI bleeding with source thought to be gallbladder after evaluation with EGD/ERCP. Hemobilia and pneumobilia was observed. Concern for cholangiocarcinoma on imaging.       -Recommend cancer workup for concern for cholangiocarcinoma.   -Recommend stopping anticoagulation in setting of active bleeding.   -No surgical intervention at this time      We will sign off for now, as workup is conducted. Please reach out with any further questions or concerns.      Trung Jean, PGY-1  General Surgery Resident   Pager: [phone]

## 2022-08-13 NOTE — PROGRESS NOTES
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 His tumor is positive for cytokeratin 20, negative for P63 and CK7, and HER2 is negative. He started on FOLFOX (5FU/oxaliplatin) on 5/15/2017. He had a delay in treatment from 9/5-10/2/17 due to work related injury.     He had an excellent response by imaging throughout the summer 2017.    He a mixed response by imaging in late fall 2017.    In January 2018, he was switched to taxol and cyramza - had slight progression and neuropathy and clinical trial became available.        In March 2018, he was started on the [facility] Match Clinical Trial with crizotinib.  (MET amplification) He remained on crizotinib from March - July 2018.     August 2018, he was switched back to taxol/cramza.  In October, he was switched to Keytruda (PD1 overexpressor).     In April 2019, his infusion was held for three weeks, and he was treated with prednisone and Enbrel for grade 3 arthralgias. Keytruda was resumed.      INTERVAL HISTORY:   In the summer of 2020, he was transitioned to Keytruda every six weeks, based on updated data.  His arthralgias and arthritis improved.  He has also been using Actembra.  He is on no prednisone.      Levi called last week complaining of a few episodes of dysphagia.  He reported difficulty with swallowing, and feeling like \"something is stuck.\"  This has not occurred again in the last week.    He had scans today and is anxious to hear about this.     He has had no issues with fevers or chills, no chest pain or shortness of breath, no nausea, vomiting, diarrhea or constipation.  No rash       He has no abdminal pain.  No dysphagia      He " -    Pulmonary Medicine  Cystic Fibrosis - Lung Transplant Team  Progress Note  2022       Patient: Sofie Rodriguez  MRN: [id number]  : 1960 (age 62 year old)  Transplant: 2022 (Lung), POD#46  Admission date: 2022    Assessment & Plan:     Sofie Rodriguez is a 62 year old female with a PMH significant for end-stage COPD, HTN, HFpEF, Mycobacterium peregrinum colonization, h/o hepatitis C, HECTOR s/p LEEP procedure, osteopenia, and former methamphetamine use. Now s/p BSLT on 22, lungs slightly undersized. Persistent low dose pressor needs post-op through 7/10. Extubated POD #2 but with persistent hypercapnia, mostly compensated and only slightly improved with intermittent BiPAP, discontinued 8/3. Also with bilateral pleural effusions, left radial artery thrombus (presumed secondary to arterial line) s/p thrombectomy , BACILIO, C diff, gastroparesis s/p GJ tube placement in IR , and coffee ground G tube output s/p EGD 8/3 with pyloric ulcer noted. Hemoglobin drop with dark tarry stools .  Also with acute rise in LFTs , abdominal US with extrahepatic mass and MRCP with increase in biliary dilation. S/p ERCP  with findings concerning for hemobilia, but no bleeding from ulcer in pyloric channel. Seen by both General Surgery and Surg Onc, unclear plan for further work up at this time.        Recommendations:  - Disregard tacrolimus level today as it was drawn after her morning dose; recheck level to trend tomorrow () ordered  - Steady state tacrolimus and repeat DSA (8/15) ordered  - Wean supplemental oxygen as able, discourage use if >92% SpO2, exercise and overnight oximetry needed prior to discharge  - BLE US prior to discharge (screening for thrombus)  - Prednisone taper due  (not yet ordered)  - IgG  (not yet ordered)  - EBV  (not yet ordered)  - Obtain updated weight     S/p bilateral sequential lung transplant (BSLT) for end stage COPD:  Persistent hypercapneic  "reports fatigue, overall this is stable, but not much better.          ROS: 10 point ROS neg other than the symptoms noted above in the HPI.     PHYSICAL EXAMINATION  BP (!) 136/94   Pulse 82   Resp 18   Ht 1.979 m (6' 5.9\")   Wt (!) 163.7 kg (361 lb)   SpO2 95%   BMI 41.82 kg/m    361 lbs 0 oz  Alert, oriented, no distress.  Moist mucosae.  Supple neck, no lymphadenopathy.  Clear AE bilaterally.  Port clean  Regular heart sounds  Obese abdomen, no murmurs.  Extensive telangiectasias bilateral LE  No edema.   No gross focal deficits.  Musc:  5/5 strength in hands, quads      Labs:      Last Comprehensive Metabolic Panel:  Sodium   Date Value Ref Range Status   11/02/2020 143 133 - 144 mmol/L Final     Potassium   Date Value Ref Range Status   11/02/2020 3.6 3.4 - 5.3 mmol/L Final     Chloride   Date Value Ref Range Status   11/02/2020 112 (H) 94 - 109 mmol/L Final     Carbon Dioxide   Date Value Ref Range Status   11/02/2020 26 20 - 32 mmol/L Final     Anion Gap   Date Value Ref Range Status   11/02/2020 5 3 - 14 mmol/L Final     Glucose   Date Value Ref Range Status   11/02/2020 104 (H) 70 - 99 mg/dL Final     Urea Nitrogen   Date Value Ref Range Status   11/02/2020 10 7 - 30 mg/dL Final     Creatinine   Date Value Ref Range Status   11/02/2020 0.92 0.66 - 1.25 mg/dL Final     GFR Estimate   Date Value Ref Range Status   11/02/2020 86 >60 mL/min/[1.73_m2] Final     Calcium   Date Value Ref Range Status   11/02/2020 8.8 8.5 - 10.1 mg/dL Final     Bilirubin Total   Date Value Ref Range Status   11/02/2020 1.3 0.2 - 1.3 mg/dL Final     Alkaline Phosphatase   Date Value Ref Range Status   11/02/2020 63 40 - 150 U/L Final     ALT   Date Value Ref Range Status   11/02/2020 28 0 - 70 U/L Final     AST   Date Value Ref Range Status   11/02/2020 18 0 - 45 U/L Final     TSH, Cortisol WNL     Reviewed CT scan with radiology demonstrating progression around the celiac plexus.  No new lesions.      IMPRESSION/PLAN:  1. " respiratory failure:   Persistent hypotension, Resolved:   Bilateral hydroPTX:  Right hemidiaphragm palsy: Explant pathology with severe emphysema with subpleural bullae formation, changes of chronic bronchitis, subpleural scars and patchy pulmonary edema; benign hilar lymph nodes. Extubated 6/30. Persistent hypercapnia without dyspnea, appears to be a respiratory drive problem. Sniff (7/14) notable for right hemidiaphragm palsy. Bronch (7/19) with slight graying of mucosa noted at right anastomosis and scant secretions (slightly increased in RLL). Chest CT (7/23) with increased loculated fluid within the left hemithorax with specks of air density in the loculated fluid, similar appearing loculated right hydroPTX with minimal decrease in fluid component, increased size of pleural based lesion in MARLON, and mild subcutaneous emphysema along right chest tube with minimal along tract of removed left chest tube.  S/p left apical chest tube 7/25-8/4, right surgical tube removed 8/6. Bronch (8/2) with normal inspection of anastomoses. NIPPV initially overnight for persistent hypercapnia, stopped 8/3 given lack of improvement with therapy, compensated hypercapnia persists. CMV (8/8) negative. CTA abdomen (8/11) noted similar appearance of bilateral loculated moderate pleural effusions with adjacent compressive atelectasis and LL predominant interlobular septal thickening. CXR reviewed today and demonstrates small right effusion, improved loculated left effusion.   - Nebs: levalbuterol and Mucomyst BID   - Aerobika and incentive spirometry hourly while awake  - CXR monitoring every other day (next due on 8/15)  - Wean supplemental oxygen as able, discourage use if >92% SpO2, exercise and overnight oximetry needed prior to discharge  - Will need BLE US prior to discharge (screening for thrombus)     Immunosuppression:  Induction therapy with basiliximab (and high dose IV steroid).  - Tacrolimus 4 mg BID (decreased 8/12, missed  Metastatic esophogeal adenocarcinoma. He has stable disease on Keytruda. He did develop grade 3 arthralgias and was placed on prednisone and Actembra (previously Enbrel).  He was transitioned to keytruda every six weeks.  We discussed given the mild progression that I would recommend resuming Keytruda every three weeks.      I would like to screen him for the Quilt trial.    Dr Singer was notified that his arthralgias may worsen.        Endocrine labs are normal.    2. H/o PE 2018.   He has progressed through Xarelto in the past (didn't take it regularly).  He was then on lovenox but his insurance changed and this became unaffordable. He is now on Coumadin and labs drawn  at [facility] in [place] fax ([phone]).  He is monitored by our coumadin clinic.     3. Baseline neuropathy and got worse with chemo and is on gabapentin 600/300/600 with relief.    4. Refer to PT at [place].                 Kadi Dee MD          evening dose 8/11, via J tube). Goal level 8-12. Level today of 41.3 which was drawn 43 minutes after her morning dose. Disregard level and recheck level to trend tomorrow  -  mg BID (increased 8/7, prior decrease for GI symptoms/leukopenia)   - Prednisone 12.5 mg qAM / 10 mg qPM, next taper due 8/18 (not yet ordered)    Date AM dose (mg) PM dose (mg)   8/4/22 12.5 10   8/18/22 10 10   9/15/22 10 7.5   10/13/22 7.5 7.5   11/10/22 7.5 5   12/8/22 5 5   1/5/23 5 2.5      Prophylaxis:   - Dapsone qMWF for PJP ppx (7/18)  - VGCV for CMV ppx, CMV negative 8/8  - Nystatin for oral candidiasis ppx, 6 month course  - See below for serologies and viral ppx:    Donor Recipient Intervention   CMV status Positive Positive Valganciclovir POD #8-90   EBV status Positive Positive EBV monitoring as below   HSV status N/A Positive Not indicated     Positive DSA: Newly positive DSA on 8/10, DQB2 with mfi 2155.   - Repeat DSA 8/15 (ordered)    ID: Donor bronch cultures (OSH) with Strep beta hemolytic (not group A).     H/o M. peregrinum colonization: NGTD post-transplant.  - AFB to be sent on all future bronchs     Streptococcus pneumoniae:  Stenotrophomonas maltophilia: Noted in recipient cultures at time of transplant.  S/p ceftazidime 6/28-7/10, vancomycin 7/7-7/8 and 6/28-6/30, and levofloxacin 7/10-7/12 for total 2 week ABX course.     Hypogammaglobulinemia: IgG adequate at time of transplant, repeat level low (336) on 7/28.  S/p IVIG dosing with premedication 7/30, tolerated well.  IgG on 8/10 low but stable at 590, replacement not indicated.  - Repeat IgG 8/30 (not yet ordered)     H/o hepatitis C:  Diagnosed in 1980s s/p 2m treatment, quant negative since 10/2017, last positive 2/20/17 ([phone]).  Ab positive on 6/2021 with negative HCV PCR.  H/o remote EtOH abuse.  MR elastography (4/27/21) with hepatology review and consult without any concerns post transplant.  Hepatitis C RNA negative and hepatitis C antibody  positive (old) on 6/28.  Repeat hepatitis C RNA negative 8/8 in setting of elevated LFTs.    EBV viremia: EBV level 11k, log 4.1 on 7/28. Not likely to be clinically significant. Repeat EBV (in the setting of elevated LFTs) decreased to 1,501, log 3.2 (8/8).  - EBV monthly monitoring (9/8, not yet ordered)     Other relevant problems managed by primary team:      SVT:   Aflutter with RVR: SVT first noted on 7/14, prior to HD line placement. Continues intermittently.  Aflutter with RVR to 200 7/17 triggered by activity.  EP consulted 7/17 given persistent tachycardia/dysrhythmia.  Midodrine held 7/19 and since discontinued.  AC and metoprolol per primary team.     Left hand ischemia: Radial artery thrombosis identified on duplex doppler s/p thrombectomy on 7/2.  Primary team to clarify plan for AC with Vascular.    BACILIO:   Hyperkalemia: Likely multifactorial including medications (Bactrim, tacrolimus) and hypotension. Fludrocortisone 7/6-7/11. S/p non-tunneled HD line 7/14.  Initial iHD run 7/14 limited by afib with RVR vs SVT.  Last iHD run 7/16, line removed 7/22.  Creatinine increased since 7/29 with Diamox and elevated tacrolimus level. Potassium had been stable, elevated intermittently since 8/8. Transitioned to low potassium TF formula 8/8 although intermittently held.  - Tacrolimus monitoring as above  - Florinef (started 8/9, increased 8/12)  - Volume management per primary team     Elevated LFTs:   Extrahepatic and intrahepatic biliary dilation: Acute rise in LFTs (alk phos 861, , AST 1,143 and bilirubin 0.9) on 8/8 (prior normal).  Also in setting of increased and different abdominal pain as below. Amylase low, lipase normal. Abdominal US 8/8 with extrahepatic mass at level of common bile duct with associated intrahepatic and common bile duct dilation, patent hepatic vasculature, and layering gallbladder sludge.  Concern for infection vs malignancy vs fluid collection.  MRCP (8/9) with slight increase  in moderate biliary dilation and gall bladder with moderate protein/hemorrhagic material.  Also noted to have cystic foci in the pancreatic head (most consistent with IPMN).  S/p ERCP (8/11) with findings concerning for hemobilia, stent placed across duodenum and in CBD.  CTA abdomen (8/11) without evidence of acute GI bleed.  GI team concerned bleeding originating from gallbladder (see note 8/12 for details). Seen by General Surgery and Surg Onc with unclear plan (no cholecystectomy planned currently).   - LFTs daily  - Will need repeat abdominal CT in 6 months for pancreatic findings  - Primary team to talk to Panch/Bili and General Surgery team for plan     Severe gastroparesis:   Pyloric ulcer: Cramping abdominal pain 7/15.  AXR with large stool burden in colon, no obstruction or distention. CT abdomen (7/15) with moderate to large gastric distention, otherwise without obstruction.  NG placed for LIS with moderate to large output for several days.  Increased abdominal pain 7/17 after clamping for 4 hours.  Gastric emptying study (7/20) with severe gastric emptying delay (95% retention at 4 hours).  S/p GJ tube placement in IR 7/27.  S/p EGD 8/3 for coffee ground G tube output, noted to have pyloric ulcer and excessive gastric fluid and residual food.  Increased abdominal pain/cramping with trial of cycled TF 8/7 to 8/8.  AXR 8/8 without evidence of peritoneal free air or abnormally dilated loops of bowel.  Hemoglobin drop with dark tarry stools 8/8.  S/p repeat EGD (8/11) with mild erythema 2/2 GJ tube trauma seen near insertion site but no active bleeding.  - PPI BID  - Simethicone prn  - TF via J tube, management per RD, GI, and primary team  - G tube to gravity drainage; NEED to clarify feeding plan (has regular diet ordered)  - Repeat EGD in 8 weeks to check healing of ulcer     C diff infection: Abdominal pain with diarrhea noted 7/8, AXR without dilated bowel, moderate colonic stool burden.  C diff  "positive 7/11.  Loose stools (on tube feeds) stable.  S/p PO vancomycin (7/11-7/28).  Repeat C diff negative 8/6.  - Low threshold to resume vancomycin in the future with ABX course     Hypomagnesemia: Suppressed absorption d/t CNI.   - Continue daily magnesium with replacement protocol prn, schedule oral magnesium supplementation if needed prior to discharge    We appreciate the excellent care provided by the [place] [id number] team.  Recommendations communicated via in person rounding and this note.  Will continue to follow along closely, please do not hesitate to call with any questions or concerns.    Patient discussed with Dr. Castillo.    Kaylin Triana PA-C  Pulmonary CF/Transplant     Subjective & Interval History:     No new pulmonary complaints, going between RA and 1 L O2. Cough is present, but stable, not too productive. Incision pain controlled. No nausea or abdominal pain, was able to eat some jello and broth this morning without issue. Stools are loose, but stable.     Review of Systems:     C: No fever, no chills, no change in weight, no change in appetite  INTEGUMENTARY/SKIN: No rash or obvious new lesions  ENT/MOUTH: No sore throat, no sinus pain, no nasal congestion or drainage  RESP: See interval history  CV: No chest pain, no palpitations, no peripheral edema, no orthopnea  GI: No nausea, no vomiting, no change in stools, no reflux symptoms  : No dysuria  MUSCULOSKELETAL: No myalgias, no arthralgias  ENDOCRINE: Blood sugars with adequate control  NEURO: No headache, no numbness or tingling  PSYCHIATRIC: Mood stable    Physical Exam:     All notes, images, and labs from past 24 hours (at minimum) were reviewed.    Vital signs:  Temp: 97.9  F (36.6  C) Temp src: Oral BP: 121/84 Pulse: 81   Resp: 12 SpO2: 100 % O2 Device: Nasal cannula Oxygen Delivery: 1 LPM Height: 157.5 cm (5' 2\") Weight: 67.9 kg (149 lb 9.6 oz)  I/O:     Intake/Output Summary (Last 24 hours) at 8/12/2022 5149  Last data filed at " 8/12/2022 0730  Gross per 24 hour   Intake 1155 ml   Output 800 ml   Net 355 ml     Constitutional: Sitting up in bed, no apparent discuss  HEENT: Eyes with pink conjunctivae, anicteric.  Oral mucosa moist without lesions.    PULM: Decreased BS, few scattered basilar crackles  CV: Normal S1 and S2.  RRR.  No peripheral edema.   ABD: NABS, soft, nondistended.  GJ tube site not visualized.  MSK: Moves all extremities.  NEURO: Alert, conversant.   SKIN: Warm, dry.  No rash on limited exam.  Lateral edges of clamshell incision visualized and intact.   PSYCH: Mood stable.     Lines, Drains, and Devices:  Midline Catheter Double Lumen (Active)   Site Assessment WDL 08/11/22 2030   External Cath Length (cm) 2 cm 08/11/22 1112   Initial Extremity Circumference (cm) 29 cm 07/28/22 1455   Dressing Intervention Chlorhexidine patch;Securing device;New dressing 08/11/22 1112   Line Necessity Yes, meets criteria 08/11/22 2030   Dressing Change Due 08/18/22 08/11/22 1112   Purple - Status saline locked 08/11/22 2030   Purple - Cap Change Due 08/12/22 08/11/22 0400   Red - Status saline locked 08/11/22 2030   Red - Cap Change Due 08/12/22 08/11/22 0400   Extravasation? No 08/11/22 2030   Number of days: 15     Data:     LABS    CMP:   Recent Labs   Lab 08/12/22 2003 08/12/22  1601 08/12/22  1158 08/12/22  0955 08/12/22  0835 08/12/22  0736 08/11/22  1016 08/11/22  0941 08/11/22  0747 08/11/22  0610 08/10/22  2314 08/10/22  2016 08/10/22  0650 08/10/22  0453 08/09/22  0742 08/09/22  0548 08/08/22  0916 08/08/22  0740   NA  --   --   --   --   --  139  --   --   --  138  --   --   --  138  --  136  --  142   POTASSIUM  --   --   --   --   --  5.2  --  4.5  --  5.4*  --  5.1   < > 5.7*   < > 5.5*  5.5*   < > 5.5*   CHLORIDE  --   --   --   --   --  99  --   --   --  97*  --   --   --  97*  --  95*  --  99   CO2  --   --   --   --   --  35*  --   --   --  37*  --   --   --  38*  --  39*  --  41*   ANIONGAP  --   --   --   --   --  5*   --   --   --  4*  --   --   --  3*  --  2*  --  2*   * 174* 144*  --  129* 133*   < >  --    < > 124*   < >  --    < > 111*   < > 219*   < > 116*   BUN  --   --   --   --   --  42.9*  --   --   --  49.5*  --   --   --  46.0*  --  55.6*  --  63.9*   CR  --   --   --   --   --  1.42*  --   --   --  1.50*  --   --   --  1.36*  --  1.41*  --  1.23*   GFRESTIMATED  --   --   --   --   --  42*  --   --   --  39*  --   --   --  44*  --  42*  --  50*   ESTUARDO  --   --   --   --   --  8.7*  --   --   --  8.9  --   --   --  9.0  --  8.9  --  8.9   MAG  --   --   --   --   --  2.1  --   --   --   --   --   --   --  2.7*  --  2.4*  --  2.5*   PHOS  --   --   --   --   --  3.9  --   --   --   --   --   --   --  4.1  --  3.8  --  3.2   PROTTOTAL  --   --   --  5.6*  --  5.0*  --   --   --  4.9*  --   --   --   --   --  4.8*  --  5.0*   ALBUMIN  --   --   --   --   --  2.6*  --   --   --  2.4*  --   --   --   --   --  2.5*  --  2.5*   BILITOTAL  --   --   --   --   --  0.2  --   --   --  0.2  --   --   --   --   --  0.3  --  0.9   ALKPHOS  --   --   --   --   --  344*  --   --   --  412*  --   --   --   --   --  644*  --  861*   AST  --   --   --   --   --  15  --   --   --  19  --   --   --   --   --  235*  --  1,143*   ALT  --   --   --   --   --  147*  --   --   --  215*  --   --   --   --   --  601*  --  791*    < > = values in this interval not displayed.     CBC:   Recent Labs   Lab 08/12/22  0955 08/11/22  2339 08/11/22  1657 08/11/22  0610 08/10/22  0453   WBC 7.4  --  10.2 7.8 8.3   RBC 2.98*  --  2.76* 2.61* 2.78*   HGB 9.1* 8.1* 8.4* 7.9* 8.4*   HCT 29.5*  --  26.8* 25.3* 27.2*   MCV 99  --  97 97 98   MCH 30.5  --  30.4 30.3 30.2   MCHC 30.8*  --  31.3* 31.2* 30.9*   RDW 16.6*  --  16.6* 16.8* 17.4*     --  342 322 287       INR:   Recent Labs   Lab 08/12/22  1657 08/11/22  1810 08/09/22  0548 08/08/22  1630   INR 0.93 0.95 0.95 1.07       Glucose:   Recent Labs   Lab 08/12/22 2003 08/12/22  1601  08/12/22  1158 08/12/22  0835 08/12/22  0736 08/12/22  0406   * 174* 144* 129* 133* 135*       Blood Gas: No lab results found in last 7 days.    Culture Data No results for input(s): CULT in the last 168 hours.    Virology Data:   Lab Results   Component Value Date    FLUAH1 Not Detected 06/29/2022    FLUAH3 Not Detected 06/29/2022    WC5199 Not Detected 06/29/2022    IFLUB Not Detected 06/29/2022    RSVA Not Detected 06/29/2022    RSVB Not Detected 06/29/2022    PIV1 Not Detected 06/29/2022    PIV2 Not Detected 06/29/2022    PIV3 Not Detected 06/29/2022    HMPV Not Detected 06/29/2022       Historical CMV results (last 3 of prior testing):  Lab Results   Component Value Date    CMVQNT Not Detected 08/08/2022    CMVQNT Not Detected 07/25/2022     No results found for: CMVLOG    Urine Studies    Recent Labs   Lab Test 08/01/22  0319 07/08/22  0831 07/05/22  1004   URINEPH 8.0* 5.5 5.5   NITRITE Negative Negative Negative   LEUKEST Negative Negative Negative   WBCU  --  1 5       Most Recent [facility] Pulmonary Function Testing (FVC/FEV1 only)  FVC-Pre   Date Value Ref Range Status   04/29/2022 1.82 L    11/11/2021 2.17 L    06/14/2021 2.00 L      FVC-%Pred-Pre   Date Value Ref Range Status   04/29/2022 58 %    11/11/2021 70 %    06/14/2021 64 %      FEV1-Pre   Date Value Ref Range Status   04/29/2022 0.51 L    11/11/2021 0.53 L    06/14/2021 0.54 L      FEV1-%Pred-Pre   Date Value Ref Range Status   04/29/2022 20 %    11/11/2021 21 %    06/14/2021 21 %        IMAGING    Recent Results (from the past 48 hour(s))   XR Chest 2 Views    Narrative    PA and lateral chest    HISTORY: Interval follow-up lung transplant pleural effusions    COMPARISON STUDY: 8/9/2022    FINDINGS: Cardiac silhouette is not enlarged. Bilateral loculated  pleural effusions tracking into the apices, bilateral fissures and  left lower pleural space is again noted. Left axillary PICC. Clamshell  sternotomy wires from bilateral transplants.       Impression    IMPRESSION: No significant change in loculated pleural effusions  bilaterally    JOHANN MORAES MD         SYSTEM ID:  [id number]   XR Surgery LARISA Fluoro Less Than 5 Min w Stills    Narrative    This exam was marked as non-reportable because it will not be read by a   radiologist or a [facility] non-radiologist provider.         CTA Abdomen Pelvis with Contrast    Narrative    EXAMINATION: CTA ABDOMEN PELVIS WITH CONTRAST, 8/11/2022 8:44 PM    TECHNIQUE:  Helical CT images from the lung bases through the pubic  symphysis were obtained  with IV contrast.     COMPARISON: Body MR 8/9/2022. Abdominal ultrasound 8/8/2022    HISTORY: hemobilia found on ERCP with stent placement    FINDINGS:  Lung bases:  Similar appearance of bilateral loculated moderate pleural effusions  with adjacent compressive atelectasis and lower lobe predominant  interlobular septal thickening suggestive of pulmonary edema.    Abdomen and pelvis:   There is unremarkable. No focal enhancing lesion. Peripheral  ill-defined areas of enhancement likely secondary to transient hepatic  attenuation differences. No intra or extrahepatic biliary duct  dilation. Spleen size is within normal limits. Postsurgical  cholecystectomy changes. Common bile duct stent and pancreatic duct  stents with small volume pneumobilia. The main pancreatic duct is not  dilated. Homogenous enhancement of the pancreas.    Adrenals are within normal limits. Symmetric renal enhancement. No  hydronephrosis, calcified stone, contour deforming mass. Bladder is  distended and normal in appearance. Bladder is unremarkable.    Percutaneous jejunostomy tube with tip terminating in the proximal  jejunum. No small or large bowel wall thickening or dilation. The  appendix is not well-visualized, however there are no significant  inflammatory changes in the right lower quadrant. No free air. No  pneumatosis or portal venous gas. Small volume ascites.    Abdominal aorta measure  branches are normal in caliber and patent with  moderate predominantly aortoiliac calcifications.    No mesenteric, retroperitoneal, or pelvic lymphadenopathy.    Bones and soft tissues: No suspicious or acute osseous lesion. Soft  tissues are unremarkable.    1.      Impression    IMPRESSION:   2.  Interval placement of common bile and pancreatic duct stents with  small volume pneumobilia. No evidence for acute GI bleed. Contrast  visualized in the distal small bowel from same day ERCP.  3.  No acute findings in the abdomen or pelvis. Small volume free  pelvic fluid.  4.  Similar appearance of bilateral loculated moderate pleural  effusions with adjacent compressive atelectasis and interlobular  septal thickening suggestive for pulmonary edema.    I have personally reviewed the examination and initial interpretation  and I agree with the findings.    JOHANN MORAES MD         SYSTEM ID:  [id number]

## 2022-08-13 NOTE — PROGRESS NOTES
[facility]    Medicine Progress Note - Hospitalist Service, GOLD TEAM 10    Date of Admission:  6/28/2022    Assessment & Plan    Sofie Rodriguez is a 62 year old female admitted on 6/28/2022. She has PMH of end stage COPD s/p b/l lung transplant on 6/28/2022, HTN, HFpEF, h/o hepC, osteopenia, HECTOR s/p LEEP, and former methamphetamine use, and she was transferred to [address] from MICU on 7/15/2022. She was admitted to the MICU on 7/13/20222 with prior hospital course complicated by left upper extremity acute limb ischemia s/p left radial thrombectomy on 7/1/2022. She was primarily treated for acute hypercapnic respiratory failure on intermittent BIPAP with worsening BACILIO while in the MICU. Breathing has improved, O2 requirements have decreased/stabilized, but patient has severely delayed gastric emptying found on NM study. PEGJ placed. Chest tubes out. Still needing to advance feeds. GI following, s/p abdominal MRI which revealed protinaceous /hemmorhagic GB cyst. She is s/p ERCP and EGD 8/11/22, which revealed hemobilia and some blood in 2nd and 3d portion of the duodenum w/o active source of bleeding, GI placed stent into CBD. Follow up CT abdomen did not reveal acute GI bleed.      Today's Plan:  - discussed with GI, they will communicate with surgery re: next steps.  Low suspicion for malignancy following ERCP per my discussion today  - continue to defer AC today, will readdress daily  - follow up remaining labs  - Resume TFs, oral diet as tolerated with supplemental TF as indicated to meet caloric goal  - PO PPI  - pulm to discuss PO status - for now full liquid for comfort    Hemobilia s/p ERCP and CBD stent  Acute Hepatitis - resolved  Dramatic rise in LFT's-- AST > ALT; ALT 700s, AST 1000s; bili rising 0.9 from 0.2. No fever. MRCP: gallbladder contains a moderate amount of proteinaceous/hemorrhagic material, which extended into the gallbladder neck. Patient  had ERCP and EGD on 8/11, no active bleeding of the pyloric channel ulcer was noted. However,  Panc/bili team did notice large amount of blood drainage from CBD. D/p stent in CBD.   - General surgery consult, oncologic surgery consult  - Resume TFs and oral diet as tolerated (patient has severe gastroparesis)  - GI arranged outpatient follow up. Will need repeat endoscopy in 8 weeks.     End stage COPD s/p BSLT 6/28/2022  Acute hypercapnic hypoxic respiratory failure (improving)  EBV Viremia  likely 2/2 decreased central respiratory drive vs respiratory muscle weakness and some pulmonary edema / fluid Transplanted 6/28. formal SNIFF test was performed on 7/14 showed R hemidiaphragm palsy. Of note transplanted lungs were also considered small for body size and could contribute to decreased respiratory compensation for hypercarbia. VBG improving gradually  - oxycodone 5-10 mg q4h PRN  - encourage activity and getting up in bed; PT/OT participation  - encourage chest physiotherapy QID  - weaned off Bipap at night  Immunosuppression:  - Prednisone per pulm  - Tacrolimus per pulm  -  BID  Prophylaxis:  - CMV - Valgancyclovir  - Thrush - PO nysatin  - PJP dapsone started 7/18 at 50mg qMWF     Severe Gastroparesis   Gastric emptying study 7/20 showed delayed gastric emptying with retention of 95% of stomach contents after 4 hours; 7/27: PEGJ placed  - tolerating tube feeds via J  Feeding regimen: J tube feeds continuous-- transitioned formula to non-renal formula as her kidneys have improved/needs more volume, appreciate RD assistance as well     Peptic ulcer at the pyloric junction with acute blood loss anemia in the setting of acute on chronic anemia-- had acute blood loss anemia after transplant-- had stabilized. Gradual hgb downtrend, then after inspection bronch her G tube was hooked back up to gravity and large brown/black output seen. EGD on 8/3 showed pyloric junction ulcer and swelling causing gastric  outlet objection. S/p EGD on 8/11 without active GI bleed of the ulcer at the pyloric channel  -GI signed off, OP plan in place  -BID PPI PO  - Hgb stable    Pleural Effusion, Right and Left  Left chest tube removed 8/4  - Gen pulm consult for R thoracentesis, thora labs sent  - daily CXR     Hypotension, resolved  H/o HTN  H/o HFpEF  Likely vasoplegia post operatively. Last echo 7/7 normal EF with good LV function. S/p hydrocortisone 7/6-7/9 and fludrocortisone 7/6-7/11 without significant improvement.  - off midorine 7/19  - Holding PTA lasix 20mg daily-- diuresis intermittently    BACILIO-- recurrent, improved 8/1-- likely due to supratherapeutic tacrolimus  Hypermagnesemia  Hyperphosphatemia  Metabolic alkalosis  Baseline renal function was normal prior to surgery. New onset renal failure after transplant, likely multifactorial due to pre-renal hypotension, less likely nephrotoxic agents. Overall kidney function improving. Today, Cr fluctuating but BUN increasing, with unclear urine output (7/22).   - HD cath removed 7/22, trend metabolites    C.diff - vanco started 7/12, course completed  -rechecked due to diarrhea (8/5)-- negative on 8/6  -Plan to clean room when patient goes to EGD/ERCP on 8/11  -Can come off precautions after room is cleaned per Infection control    Tachyarrthymia  Paroxysmal afib  Paroxysmal aflutter/AT  SVT vs afib.Had an occurrence during HD on 7/14. Had afib with RVR to 200s on 7/17. EP consult placed.asmyptomatic and stable during episodes. Aflutter episode (7/17) with transfer. Vagal maneuver responsive at time. No recent tachyarrhythmia episodes (7/22).   - Per EP: patient has had episodes of possible flutter (vs AT with 2:1 conduction) and afib with RVR  - heparin drip -- eventual plan to transition to DOAC after PEGJ placement and chest tubes resolved (CHADS-VASc score of 2)  --HOLDING heparin drip 8/8- due to melena  - On telemetry  - On metoprolol tartrate 25mg BID-- increasing to 50  BID 8/7  - Discharge on ziopatch for 14 days with EP follow up in 1-2 months after     Stress-induced/steroid induced hyperglycemia with intermittent hypoglycemia (8/3)  Has been controlled on 13-15 units of glargine BID  - on 7 units BID  8/5 since she is tapering steroid and has been NPO-- holding long acting given NPO/holding tube feeds  -- will continue to monitor her needs     Incidental IPMN  Found on MRCP 8/9/22. Cystic foci in the pancreatic head measuring 4 x 3 mm superiorly and 4  x 3 mm inferiorly. Per [facility] guidelines on IPMN:  - Follow up imaging in 6 months to 1 year, then lengthen interval to 2-3 years if no change     Diet: Tube feeds via PEGJ  DVT Prophylaxis: SCDs  Dong Catheter: Not present  Central Lines: PRESENT       Cardiac Monitoring: None  Code Status: Full Code      Disposition Plan     Expected Discharge Date: 08/15/2022    Discharge Delays: Other (Add Comment)  Destination: home  Discharge Comments: TCU,        The patient's care was discussed with the Bedside Nurse, Patient and pulm, GI luminal and GI hepatology and nutrition Consultant.    Salvador Matthew MD  hospitalist    ______________________________________________________________________    Interval History   Feeling well generally. No abdominal pain this morning. Appetite is somewhat better. Denies significant nausea today. Breathing is stable. Minimal cough.    Data reviewed today: I reviewed all medications, new labs and imaging results over the last 24 hours. I personally reviewed the chest x-ray image(s) showing chest tubes are out; post surgical changes .    Physical Exam   Vital Signs: Temp: 99.6  F (37.6  C) Temp src: Oral BP: (!) 144/82 Pulse: 111   Resp: 19 SpO2: 99 % O2 Device: Nasal cannula Oxygen Delivery: 1 LPM  Weight: 149 lbs 9.6 oz  Constitutional: Resting in bed.  Head: Normocephalic. Atraumatic.   EENT: No conjunctival injection or icterus. Nares patent. Moist mucous membranes.   Cardiovascular: Regular rate and  rhythm. No murmurs, clicks, gallops or rubs. No edema  Respiratory: Clear to auscultation without wheezes or crackles. Normal respiratory rate and effort.   Gastrointestinal: Abdomen nontender throughout. Bowel sounds active.   Musculoskeletal: extremities normal- no gross deformities noted and normal muscle tone  Skin: no suspicious lesions, rashes, jaundice, or cyanosis   Psychiatric: mentation appears normal and affect normal/bright      Data   Recent Labs   Lab 08/13/22  1238 08/13/22  0841 08/13/22  0812 08/12/22 2003 08/12/22  1657 08/12/22  1158 08/12/22  0955 08/12/22  0835 08/12/22  0736 08/12/22  0044 08/11/22  2339 08/11/22  2050 08/11/22  1810 08/11/22  1657 08/11/22  1016 08/11/22  0941 08/11/22  0747 08/11/22  0610 08/09/22  0742 08/09/22  0548 08/08/22  0916 08/08/22  0740   WBC  --  7.9  --   --   --   --  7.4  --   --   --   --   --   --  10.2  --   --   --  7.8   < > 6.3  --  4.6   HGB  --  9.2*  --   --   --   --  9.1*  --   --   --  8.1*  --   --  8.4*  --   --   --  7.9*   < > 6.5*   < > 6.5*   MCV  --  100  --   --   --   --  99  --   --   --   --   --   --  97  --   --   --  97   < > 100  --  101*   PLT  --  422  --   --   --   --  344  --   --   --   --   --   --  342  --   --   --  322   < > 239  --  242   INR  --   --   --   --  0.93  --   --   --   --   --   --   --  0.95  --   --   --   --   --   --  0.95   < >  --    NA  --  144  --   --   --   --   --   --  139  --   --   --   --   --   --   --   --  138   < > 136  --  142   POTASSIUM  --  5.2  --   --   --   --   --   --  5.2  --   --   --   --   --   --  4.5  --  5.4*   < > 5.5*  5.5*   < > 5.5*   CHLORIDE  --  100  --   --   --   --   --   --  99  --   --   --   --   --   --   --   --  97*   < > 95*  --  99   CO2  --  36*  --   --   --   --   --   --  35*  --   --   --   --   --   --   --   --  37*   < > 39*  --  41*   BUN  --  42.4*  --   --   --   --   --   --  42.9*  --   --   --   --   --   --   --   --  49.5*   < > 55.6*  --   63.9*   CR  --  1.46*  --   --   --   --   --   --  1.42*  --   --   --   --   --   --   --   --  1.50*   < > 1.41*  --  1.23*   ANIONGAP  --  8  --   --   --   --   --   --  5*  --   --   --   --   --   --   --   --  4*   < > 2*  --  2*   ESTUARDO  --  9.1  --   --   --   --   --   --  8.7*  --   --   --   --   --   --   --   --  8.9   < > 8.9  --  8.9   * 139* 130*   < >  --    < >  --    < > 133*   < >  --    < >  --   --    < >  --    < > 124*   < > 219*   < > 116*   ALBUMIN  --  2.8*  --   --   --   --   --   --  2.6*  --   --   --   --   --   --   --   --  2.4*  --  2.5*  --  2.5*   PROTTOTAL  --  5.6*  --   --   --   --  5.6*  --  5.0*  --   --   --   --   --   --   --   --  4.9*  --  4.8*  --  5.0*   BILITOTAL  --  0.2  --   --   --   --   --   --  0.2  --   --   --   --   --   --   --   --  0.2  --  0.3  --  0.9   ALKPHOS  --  345*  --   --   --   --   --   --  344*  --   --   --   --   --   --   --   --  412*  --  644*  --  861*   ALT  --  120*  --   --   --   --   --   --  147*  --   --   --   --   --   --   --   --  215*  --  601*  --  791*   AST  --  18  --   --   --   --   --   --  15  --   --   --   --   --   --   --   --  19  --  235*  --  1,143*   LIPASE  --   --   --   --   --   --   --   --   --   --   --   --   --   --   --   --   --   --   --   --   --  18    < > = values in this interval not displayed.     Recent Results (from the past 24 hour(s))   XR Chest Port 1 View    Narrative    Exam:  Chest 1 view    History: Heart failure and COPD, status post bilateral lung transplant  6/20/2022 complicated by acute limb ischemia.    Indication: Evaluation postthoracentesis.    Comparison: Chest x-ray 8/11/2022.    Findings: AP portable semiupright radiograph of the chest. Stable  postsurgical changes of bilateral lung transplant with visible  surgical clips and intact clamshell sternotomy wires. Left approach  PICC line with tip terminating in the left axilla.    Cardiomediastinal silhouette size  is stable. Unchanged left loculated  pleural effusion with fluid tracking into the left lung apices,  fissure, and lower pleural space. Grossly unchanged appearance of  right loculated pleural effusion status post thoracentesis, fluid  continues in the right horizontal fissure and tracking up to the lung  apices. There is rightward deviation of the thoracic trachea similar  as seen on prior radiograph. Visualized bones and upper abdomen are  unremarkable. Consolidation of the left lower lobe.      Impression    Impression:    1. Stable bilateral pleural effusions. Unchanged loculated left  pleural effusion   2. Right loculated pleural effusion not significantly changed in size  status post right thoracentesis.   3.  Left lung base consolidation, and large left pleural effusion.    I have personally reviewed the examination and initial interpretation  and I agree with the findings.    JOHANN MORAES MD         SYSTEM ID:  [id number]     Medications     dextrose Stopped (07/15/22 1801)     [Held by provider] heparin Stopped (08/12/22 1729)       [Held by provider] acetaminophen  975 mg Per J Tube 2 times daily     acetylcysteine  2 mL Nebulization BID     [Held by provider] aspirin  81 mg Per J Tube Daily     calcium carbonate 600 mg-vitamin D 400 units  1 tablet Per J Tube BID w/meals     dapsone  50 mg Per J Tube Once per day on Mon Wed Fri     dextrose 10%  300 mL Intravenous Once     fludrocortisone  0.1 mg Per J Tube Daily     insulin aspart  1-12 Units Subcutaneous Q4H     [Held by provider] insulin glargine  7 Units Subcutaneous BID     levalbuterol  1.25 mg Nebulization 2 times daily     metoprolol tartrate  50 mg Per Feeding Tube BID     multivitamin, therapeutic  1 tablet Per Feeding Tube Daily     mycophenolate  750 mg Per J Tube BID     nystatin   Topical BID     nystatin  1,000,000 Units Swish & Swallow 4x Daily     pantoprazole  40 mg Per J Tube BID     predniSONE  12.5 mg Per J Tube QAM    And      predniSONE  10 mg Per J Tube QPM     protein modular  1 packet Per Feeding Tube Daily     sodium chloride (PF)  10 mL Intracatheter Q8H     tacrolimus  4 mg Per J Tube BID IS     valGANciclovir  450 mg Oral or Feeding Tube Once per day on Mon Wed Fri

## 2022-08-14 ENCOUNTER — APPOINTMENT (OUTPATIENT)
Dept: PHYSICAL THERAPY | Facility: CLINIC | Age: 62
DRG: 007 | End: 2022-08-14
Attending: THORACIC SURGERY (CARDIOTHORACIC VASCULAR SURGERY)
Payer: MEDICARE

## 2022-08-14 LAB
ALBUMIN SERPL BCG-MCNC: 2.6 G/DL (ref 3.5–5.2)
ALP SERPL-CCNC: 278 U/L (ref 35–104)
ALT SERPL W P-5'-P-CCNC: 90 U/L (ref 10–35)
ANION GAP SERPL CALCULATED.3IONS-SCNC: 7 MMOL/L (ref 7–15)
AST SERPL W P-5'-P-CCNC: 18 U/L (ref 10–35)
BILIRUB DIRECT SERPL-MCNC: <0.2 MG/DL (ref 0–0.3)
BILIRUB SERPL-MCNC: <0.2 MG/DL
BUN SERPL-MCNC: 50.2 MG/DL (ref 8–23)
CALCIUM SERPL-MCNC: 8.8 MG/DL (ref 8.8–10.2)
CANCER AG19-9 SERPL IA-ACNC: 20 U/ML
CHLORIDE SERPL-SCNC: 101 MMOL/L (ref 98–107)
CREAT SERPL-MCNC: 1.58 MG/DL (ref 0.51–0.95)
DEPRECATED HCO3 PLAS-SCNC: 35 MMOL/L (ref 22–29)
ERYTHROCYTE [DISTWIDTH] IN BLOOD BY AUTOMATED COUNT: 16.7 % (ref 10–15)
GFR SERPL CREATININE-BSD FRML MDRD: 37 ML/MIN/1.73M2
GLUCOSE BLDC GLUCOMTR-MCNC: 145 MG/DL (ref 70–99)
GLUCOSE BLDC GLUCOMTR-MCNC: 162 MG/DL (ref 70–99)
GLUCOSE BLDC GLUCOMTR-MCNC: 186 MG/DL (ref 70–99)
GLUCOSE BLDC GLUCOMTR-MCNC: 207 MG/DL (ref 70–99)
GLUCOSE BLDC GLUCOMTR-MCNC: 216 MG/DL (ref 70–99)
GLUCOSE SERPL-MCNC: 152 MG/DL (ref 70–99)
HCT VFR BLD AUTO: 31.8 % (ref 35–47)
HGB BLD-MCNC: 9.4 G/DL (ref 11.7–15.7)
HOLD SPECIMEN: NORMAL
MCH RBC QN AUTO: 30.1 PG (ref 26.5–33)
MCHC RBC AUTO-ENTMCNC: 29.6 G/DL (ref 31.5–36.5)
MCV RBC AUTO: 102 FL (ref 78–100)
PLATELET # BLD AUTO: 407 10E3/UL (ref 150–450)
POTASSIUM SERPL-SCNC: 4.2 MMOL/L (ref 3.4–5.3)
PROT SERPL-MCNC: 5.2 G/DL (ref 6.4–8.3)
RBC # BLD AUTO: 3.12 10E6/UL (ref 3.8–5.2)
SODIUM SERPL-SCNC: 143 MMOL/L (ref 136–145)
TACROLIMUS BLD-MCNC: 10.6 UG/L (ref 5–15)
TME LAST DOSE: NORMAL H
TME LAST DOSE: NORMAL H
WBC # BLD AUTO: 10.4 10E3/UL (ref 4–11)

## 2022-08-14 PROCEDURE — 120N000002 HC R&B MED SURG/OB UMMC

## 2022-08-14 PROCEDURE — 97110 THERAPEUTIC EXERCISES: CPT | Mod: GP

## 2022-08-14 PROCEDURE — 99233 SBSQ HOSP IP/OBS HIGH 50: CPT | Mod: 24 | Performed by: PHYSICIAN ASSISTANT

## 2022-08-14 PROCEDURE — 85027 COMPLETE CBC AUTOMATED: CPT | Performed by: STUDENT IN AN ORGANIZED HEALTH CARE EDUCATION/TRAINING PROGRAM

## 2022-08-14 PROCEDURE — 36592 COLLECT BLOOD FROM PICC: CPT | Performed by: STUDENT IN AN ORGANIZED HEALTH CARE EDUCATION/TRAINING PROGRAM

## 2022-08-14 PROCEDURE — 250N000013 HC RX MED GY IP 250 OP 250 PS 637: Performed by: STUDENT IN AN ORGANIZED HEALTH CARE EDUCATION/TRAINING PROGRAM

## 2022-08-14 PROCEDURE — 250N000012 HC RX MED GY IP 250 OP 636 PS 637: Performed by: PHYSICIAN ASSISTANT

## 2022-08-14 PROCEDURE — 250N000011 HC RX IP 250 OP 636: Performed by: SURGERY

## 2022-08-14 PROCEDURE — 82248 BILIRUBIN DIRECT: CPT | Performed by: STUDENT IN AN ORGANIZED HEALTH CARE EDUCATION/TRAINING PROGRAM

## 2022-08-14 PROCEDURE — 250N000013 HC RX MED GY IP 250 OP 250 PS 637: Performed by: SURGERY

## 2022-08-14 PROCEDURE — 250N000009 HC RX 250: Performed by: NURSE PRACTITIONER

## 2022-08-14 PROCEDURE — 250N000012 HC RX MED GY IP 250 OP 636 PS 637: Performed by: INTERNAL MEDICINE

## 2022-08-14 PROCEDURE — 999N000157 HC STATISTIC RCP TIME EA 10 MIN

## 2022-08-14 PROCEDURE — 250N000011 HC RX IP 250 OP 636: Performed by: PHYSICIAN ASSISTANT

## 2022-08-14 PROCEDURE — 94640 AIRWAY INHALATION TREATMENT: CPT

## 2022-08-14 PROCEDURE — 99233 SBSQ HOSP IP/OBS HIGH 50: CPT | Performed by: STUDENT IN AN ORGANIZED HEALTH CARE EDUCATION/TRAINING PROGRAM

## 2022-08-14 PROCEDURE — 36592 COLLECT BLOOD FROM PICC: CPT | Performed by: PHYSICIAN ASSISTANT

## 2022-08-14 PROCEDURE — 250N000013 HC RX MED GY IP 250 OP 250 PS 637: Performed by: NURSE PRACTITIONER

## 2022-08-14 PROCEDURE — 250N000013 HC RX MED GY IP 250 OP 250 PS 637: Performed by: INTERNAL MEDICINE

## 2022-08-14 PROCEDURE — 97530 THERAPEUTIC ACTIVITIES: CPT | Mod: GP

## 2022-08-14 PROCEDURE — 999N000044 HC STATISTIC CVC DRESSING CHANGE

## 2022-08-14 PROCEDURE — 80197 ASSAY OF TACROLIMUS: CPT | Performed by: PHYSICIAN ASSISTANT

## 2022-08-14 PROCEDURE — 250N000012 HC RX MED GY IP 250 OP 636 PS 637: Performed by: NURSE PRACTITIONER

## 2022-08-14 PROCEDURE — 250N000013 HC RX MED GY IP 250 OP 250 PS 637: Performed by: HOSPITALIST

## 2022-08-14 PROCEDURE — 82310 ASSAY OF CALCIUM: CPT | Performed by: STUDENT IN AN ORGANIZED HEALTH CARE EDUCATION/TRAINING PROGRAM

## 2022-08-14 PROCEDURE — 250N000012 HC RX MED GY IP 250 OP 636 PS 637: Performed by: STUDENT IN AN ORGANIZED HEALTH CARE EDUCATION/TRAINING PROGRAM

## 2022-08-14 RX ORDER — FUROSEMIDE 10 MG/ML
20 INJECTION INTRAMUSCULAR; INTRAVENOUS ONCE
Status: COMPLETED | OUTPATIENT
Start: 2022-08-14 | End: 2022-08-14

## 2022-08-14 RX ADMIN — INSULIN ASPART 1 UNITS: 100 INJECTION, SOLUTION INTRAVENOUS; SUBCUTANEOUS at 11:34

## 2022-08-14 RX ADMIN — ACETYLCYSTEINE 2 ML: 200 INHALANT RESPIRATORY (INHALATION) at 08:11

## 2022-08-14 RX ADMIN — INSULIN ASPART 3 UNITS: 100 INJECTION, SOLUTION INTRAVENOUS; SUBCUTANEOUS at 17:34

## 2022-08-14 RX ADMIN — Medication 40 MG: at 08:58

## 2022-08-14 RX ADMIN — METOPROLOL TARTRATE 50 MG: 50 TABLET, FILM COATED ORAL at 08:58

## 2022-08-14 RX ADMIN — OXYCODONE HYDROCHLORIDE 10 MG: 5 TABLET ORAL at 08:56

## 2022-08-14 RX ADMIN — NYSTATIN 1000000 UNITS: 100000 SUSPENSION ORAL at 08:57

## 2022-08-14 RX ADMIN — THERA TABS 1 TABLET: TAB at 11:34

## 2022-08-14 RX ADMIN — FLUDROCORTISONE ACETATE 0.1 MG: 0.1 TABLET ORAL at 08:57

## 2022-08-14 RX ADMIN — NYSTATIN 1000000 UNITS: 100000 SUSPENSION ORAL at 17:33

## 2022-08-14 RX ADMIN — Medication 1 PACKET: at 11:34

## 2022-08-14 RX ADMIN — PREDNISONE 12.5 MG: 2.5 TABLET ORAL at 08:57

## 2022-08-14 RX ADMIN — NYSTATIN: 100000 CREAM TOPICAL at 09:03

## 2022-08-14 RX ADMIN — MYCOPHENOLATE MOFETIL 750 MG: 200 POWDER, FOR SUSPENSION ORAL at 19:41

## 2022-08-14 RX ADMIN — TACROLIMUS 4 MG: 5 CAPSULE ORAL at 08:58

## 2022-08-14 RX ADMIN — METOPROLOL TARTRATE 50 MG: 50 TABLET, FILM COATED ORAL at 19:41

## 2022-08-14 RX ADMIN — INSULIN ASPART 4 UNITS: 100 INJECTION, SOLUTION INTRAVENOUS; SUBCUTANEOUS at 19:45

## 2022-08-14 RX ADMIN — TACROLIMUS 4 MG: 5 CAPSULE ORAL at 17:33

## 2022-08-14 RX ADMIN — Medication 40 MG: at 19:43

## 2022-08-14 RX ADMIN — CALCIUM CARBONATE 600 MG (1,500 MG)-VITAMIN D3 400 UNIT TABLET 1 TABLET: at 08:57

## 2022-08-14 RX ADMIN — CALCIUM CARBONATE 600 MG (1,500 MG)-VITAMIN D3 400 UNIT TABLET 1 TABLET: at 17:33

## 2022-08-14 RX ADMIN — MYCOPHENOLATE MOFETIL 750 MG: 200 POWDER, FOR SUSPENSION ORAL at 08:58

## 2022-08-14 RX ADMIN — INSULIN ASPART 1 UNITS: 100 INJECTION, SOLUTION INTRAVENOUS; SUBCUTANEOUS at 04:52

## 2022-08-14 RX ADMIN — INSULIN ASPART 2 UNITS: 100 INJECTION, SOLUTION INTRAVENOUS; SUBCUTANEOUS at 09:28

## 2022-08-14 RX ADMIN — PREDNISONE 10 MG: 10 TABLET ORAL at 19:41

## 2022-08-14 RX ADMIN — Medication 40 MG: at 09:31

## 2022-08-14 RX ADMIN — FUROSEMIDE 20 MG: 10 INJECTION, SOLUTION INTRAMUSCULAR; INTRAVENOUS at 12:02

## 2022-08-14 RX ADMIN — ONDANSETRON 4 MG: 4 TABLET, ORALLY DISINTEGRATING ORAL at 12:20

## 2022-08-14 RX ADMIN — OXYCODONE HYDROCHLORIDE 10 MG: 5 TABLET ORAL at 04:47

## 2022-08-14 RX ADMIN — LEVALBUTEROL HYDROCHLORIDE 1.25 MG: 1.25 SOLUTION RESPIRATORY (INHALATION) at 08:12

## 2022-08-14 ASSESSMENT — ACTIVITIES OF DAILY LIVING (ADL)
ADLS_ACUITY_SCORE: 30

## 2022-08-14 NOTE — PROVIDER NOTIFICATION
"RN paged primary team at 1125 \"CLEMENTINA Rodriguez 6D 1931: FYI lab is having a difficult time drawing blood from pts midline. Line flushes well, just no blood return. Difficult stick. Thoughts? Caprice Polk\"    No new orders from primary team.     RN paged infection prevention at 6917 \"CLEMENTINA Rodriguez 8D [id number]: Pt is confirmed C-Diff negative, room has been deep cleaned, would you be able to officially take the enteric precautions off pt? Thank you! Caprice Caldwell91\"     Caprice Patel RN    "

## 2022-08-14 NOTE — PROGRESS NOTES
[facility]    Medicine Progress Note - Hospitalist Service, GOLD TEAM 10    Date of Admission:  6/28/2022    Assessment & Plan    Sofie Rodriguez is a 62 year old female admitted on 6/28/2022. She has PMH of end stage COPD s/p b/l lung transplant on 6/28/2022, HTN, HFpEF, h/o hepC, osteopenia, HECTOR s/p LEEP, and former methamphetamine use, and she was transferred to [address] from MICU on 7/15/2022. She was admitted to the MICU on 7/13/20222 with prior hospital course complicated by left upper extremity acute limb ischemia s/p left radial thrombectomy on 7/1/2022. She was primarily treated for acute hypercapnic respiratory failure on intermittent BIPAP with worsening BACILIO while in the MICU. Breathing has improved, O2 requirements have decreased/stabilized, but patient has severely delayed gastric emptying found on NM study. PEGJ placed. Chest tubes out. Still needing to advance feeds. GI following, s/p abdominal MRI which revealed protinaceous /hemmorhagic GB cyst. She is s/p ERCP and EGD 8/11/22, which revealed hemobilia and some blood in 2nd and 3d portion of the duodenum w/o active source of bleeding, GI placed stent into CBD. Follow up CT abdomen did not reveal acute GI bleed.      Today's Plan:  - discussed with GI, plan to follow up with surgical oncology tomorrow re: potential for GB removal in interval  -  Discussed with EP, reasonable to defer AC at this time in setting of risk/benefit. They will see tomorrow.  - advance TFs, for now full liquid for comfort  - PO PPI  - follow BP, may need to titrate meds 8/15    Hemobilia s/p ERCP and CBD stent  Acute Hepatitis - resolved  Dramatic rise in LFT's-- AST > ALT; ALT 700s, AST 1000s; bili rising 0.9 from 0.2. No fever. MRCP: gallbladder contains a moderate amount of proteinaceous/hemorrhagic material, which extended into the gallbladder neck. Patient had ERCP and EGD on 8/11, no active bleeding of the pyloric  channel ulcer was noted. However,  Panc/bili team did notice large amount of blood drainage from CBD. D/p stent in CBD.   - General surgery consult, oncologic surgery consult  - Resume TFs and full liquids (patient has severe gastroparesis)  - GI arranged outpatient follow up. Will need repeat endoscopy in 8 weeks.     End stage COPD s/p BSLT 6/28/2022  Acute hypercapnic hypoxic respiratory failure (improving)  EBV Viremia  likely 2/2 decreased central respiratory drive vs respiratory muscle weakness and some pulmonary edema / fluid Transplanted 6/28. formal SNIFF test was performed on 7/14 showed R hemidiaphragm palsy. Of note transplanted lungs were also considered small for body size and could contribute to decreased respiratory compensation for hypercarbia. VBG improving gradually  - oxycodone 5-10 mg q4h PRN  - encourage activity and getting up in bed; PT/OT participation  - encourage chest physiotherapy QID  - weaned off Bipap at night  Immunosuppression:  - Prednisone per pulm  - Tacrolimus per pulm  -  BID  Prophylaxis:  - CMV - Valgancyclovir  - Thrush - PO nysatin  - PJP dapsone started 7/18 at 50mg qMWF     Severe Gastroparesis   Gastric emptying study 7/20 showed delayed gastric emptying with retention of 95% of stomach contents after 4 hours; 7/27: PEGJ placed  - tolerating tube feeds via J  Feeding regimen: J tube feeds continuous-- transitioned formula to non-renal formula as her kidneys have improved/needs more volume, appreciate RD assistance as well     Peptic ulcer at the pyloric junction with acute blood loss anemia in the setting of acute on chronic anemia-- had acute blood loss anemia after transplant-- had stabilized. Gradual hgb downtrend, then after inspection bronch her G tube was hooked back up to gravity and large brown/black output seen. EGD on 8/3 showed pyloric junction ulcer and swelling causing gastric outlet objection. S/p EGD on 8/11 without active GI bleed of the ulcer at  the pyloric channel  -GI signed off, OP plan in place  -BID PPI PO  - Hgb stable    Pleural Effusion, Right and Left  Left chest tube removed 8/4, thora 8/11  - daily CXR     Hypotension, resolved  H/o HTN  H/o HFpEF  Likely vasoplegia post operatively. Last echo 7/7 normal EF with good LV function. S/p hydrocortisone 7/6-7/9 and fludrocortisone 7/6-7/11 without significant improvement.  - off midorine 7/19  - Holding PTA lasix 20mg daily-- diuresis intermittently    BACILIO-- recurrent, improved 8/1-- likely due to supratherapeutic tacrolimus  Hypermagnesemia  Hyperphosphatemia  Metabolic alkalosis  Baseline renal function was normal prior to surgery. New onset renal failure after transplant, likely multifactorial due to pre-renal hypotension, less likely nephrotoxic agents. Overall kidney function improving. Today, Cr fluctuating but BUN increasing, with unclear urine output (7/22).   - HD cath removed 7/22, trend metabolites    C.diff - vanco started 7/12, course completed  -rechecked due to diarrhea (8/5)-- negative on 8/6    Tachyarrthymia  Paroxysmal afib  Paroxysmal aflutter/AT  SVT vs afib.Had an occurrence during HD on 7/14. Had afib with RVR to 200s on 7/17. EP consult placed.asmyptomatic and stable during episodes. Aflutter episode (7/17) with transfer. Vagal maneuver responsive at time. No recent tachyarrhythmia episodes (7/22).   - Per EP: patient has had episodes of possible flutter (vs AT with 2:1 conduction) and afib with RVR  (CHADS-VASc score of 2)  - On metoprolol tartrate 25mg BID-- increasing to 50 BID 8/7  - Discharge on ziopatch for 14 days with EP follow up in 1-2 months after  - holding AC at this time due to recurrent bleeding     Stress-induced/steroid induced hyperglycemia with intermittent hypoglycemia (8/3)  Has been controlled on 13-15 units of glargine BID previously  - sliding scale insulin, adjust as needed  -- will continue to monitor her needs     Incidental IPMN  Found on MRCP  8/9/22. Cystic foci in the pancreatic head measuring 4 x 3 mm superiorly and 4  x 3 mm inferiorly. Per [facility] guidelines on IPMN:  - Follow up imaging in 6 months to 1 year, then lengthen interval to 2-3 years if no change     Diet: Tube feeds via PEGJ  DVT Prophylaxis: SCDs  Dong Catheter: Not present  Central Lines: PRESENT       Cardiac Monitoring: None  Code Status: Full Code      Disposition Plan     Expected Discharge Date: 08/15/2022    Discharge Delays: Other (Add Comment)  Destination: home  Discharge Comments: TCU,        The patient's care was discussed with the Bedside Nurse, Patient and pulm, GI luminal and GI hepatology and nutrition Consultant.    Salvador Matthew MD  hospitalist    ______________________________________________________________________    Interval History   Mild abdominal discomfort today following medications. No nausea or vomiting. No fevers. Having bowel movements, soft.  Breathing is stable.  No chest pain.    Data reviewed today: I reviewed all medications, new labs and imaging results over the last 24 hours. I personally reviewed the chest x-ray image(s) showing chest tubes are out; post surgical changes .    Physical Exam   Vital Signs: Temp: 98.6  F (37  C) Temp src: Oral BP: (!) 140/79 Pulse: 111   Resp: 14 SpO2: 100 % O2 Device: Nasal cannula Oxygen Delivery: 1 LPM  Weight: 149 lbs 9.6 oz  Constitutional: Resting in bed.  Head: Normocephalic. Atraumatic.   EENT: No conjunctival injection or icterus. Nares patent. Moist mucous membranes.   Cardiovascular: Regular rate and rhythm. No murmurs, clicks, gallops or rubs. No edema  Respiratory: Clear to auscultation without wheezes or crackles. Normal respiratory rate and effort.   Gastrointestinal: Abdomen diffusely tender throughout. Bowel sounds active.   Musculoskeletal: extremities normal- no gross deformities noted and normal muscle tone  Skin: no suspicious lesions, rashes, jaundice, or cyanosis   Psychiatric: mentation appears  normal and affect normal/bright      Data   Recent Labs   Lab 08/14/22  1130 08/14/22  1122 08/14/22  0927 08/14/22  0653 08/13/22  1238 08/13/22  0841 08/12/22 2003 08/12/22  1657 08/12/22  1158 08/12/22  0955 08/12/22  0835 08/12/22  0736 08/11/22  2050 08/11/22  1810 08/09/22  0742 08/09/22  0548 08/08/22  0916 08/08/22  0740   WBC  --  10.4  --   --   --  7.9  --   --   --  7.4  --   --   --   --    < > 6.3  --  4.6   HGB  --  9.4*  --   --   --  9.2*  --   --   --  9.1*  --   --    < >  --    < > 6.5*   < > 6.5*   MCV  --  102*  --   --   --  100  --   --   --  99  --   --   --   --    < > 100  --  101*   PLT  --  407  --   --   --  422  --   --   --  344  --   --   --   --    < > 239  --  242   INR  --   --   --   --   --   --   --  0.93  --   --   --   --   --  0.95  --  0.95   < >  --    NA  --   --   --  143  --  144  --   --   --   --   --  139  --   --    < > 136  --  142   POTASSIUM  --   --   --  4.2  --  5.2  --   --   --   --   --  5.2  --   --    < > 5.5*  5.5*   < > 5.5*   CHLORIDE  --   --   --  101  --  100  --   --   --   --   --  99  --   --    < > 95*  --  99   CO2  --   --   --  35*  --  36*  --   --   --   --   --  35*  --   --    < > 39*  --  41*   BUN  --   --   --  50.2*  --  42.4*  --   --   --   --   --  42.9*  --   --    < > 55.6*  --  63.9*   CR  --   --   --  1.58*  --  1.46*  --   --   --   --   --  1.42*  --   --    < > 1.41*  --  1.23*   ANIONGAP  --   --   --  7  --  8  --   --   --   --   --  5*  --   --    < > 2*  --  2*   ESTUARDO  --   --   --  8.8  --  9.1  --   --   --   --   --  8.7*  --   --    < > 8.9  --  8.9   *  --  186* 152*   < > 139*   < >  --    < >  --    < > 133*   < >  --    < > 219*   < > 116*   ALBUMIN  --   --   --   --   --  2.8*  --   --   --   --   --  2.6*  --   --    < > 2.5*  --  2.5*   PROTTOTAL  --   --   --   --   --  5.6*  --   --   --  5.6*  --  5.0*  --   --    < > 4.8*  --  5.0*   BILITOTAL  --   --   --   --   --  0.2  --   --   --   --   --   0.2  --   --    < > 0.3  --  0.9   ALKPHOS  --   --   --   --   --  345*  --   --   --   --   --  344*  --   --    < > 644*  --  861*   ALT  --   --   --   --   --  120*  --   --   --   --   --  147*  --   --    < > 601*  --  791*   AST  --   --   --   --   --  18  --   --   --   --   --  15  --   --    < > 235*  --  1,143*   LIPASE  --   --   --   --   --   --   --   --   --   --   --   --   --   --   --   --   --  18    < > = values in this interval not displayed.     No results found for this or any previous visit (from the past 24 hour(s)).  Medications     dextrose Stopped (07/15/22 1801)     [Held by provider] heparin Stopped (08/12/22 1729)       [Held by provider] acetaminophen  975 mg Per J Tube 2 times daily     acetylcysteine  2 mL Nebulization BID     [Held by provider] aspirin  81 mg Per J Tube Daily     calcium carbonate 600 mg-vitamin D 400 units  1 tablet Per J Tube BID w/meals     dapsone  50 mg Per J Tube Once per day on Mon Wed Fri     dextrose 10%  300 mL Intravenous Once     fludrocortisone  0.1 mg Per J Tube Daily     insulin aspart  1-12 Units Subcutaneous Q4H     [Held by provider] insulin glargine  7 Units Subcutaneous BID     levalbuterol  1.25 mg Nebulization 2 times daily     metoprolol tartrate  50 mg Per Feeding Tube BID     multivitamin, therapeutic  1 tablet Per Feeding Tube Daily     mycophenolate  750 mg Per J Tube BID     nystatin   Topical BID     nystatin  1,000,000 Units Swish & Swallow 4x Daily     pantoprazole  40 mg Per J Tube BID     predniSONE  12.5 mg Per J Tube QAM    And     predniSONE  10 mg Per J Tube QPM     protein modular  1 packet Per Feeding Tube Daily     sodium chloride (PF)  10 mL Intracatheter Q8H     tacrolimus  4 mg Oral BID IS     valGANciclovir  450 mg Oral or Feeding Tube Once per day on Mon Wed Fri

## 2022-08-14 NOTE — PLAN OF CARE
Neuro: A&Ox4. Follows commands. Call light appropriate.   Cardiac: ST. 100s-110s. HR increases to low 120s when pt is in pain.   Respiratory: Sating > 90% on RA.  GI/: Adequate urine output via commode. LBM 8/14. Loose.   Diet/appetite: Full liquid diet to pt comfort due to delayed gastric emptying. Tube feeds at goal 35mL/hr FWF 30mL Q4H.   Activity:  SBA, up to chair and commode.   Pain: Pt reports pain in abdomen and back between a 5-6/10. PRN oxy and PRN simethicone given to good effect.   Skin: No new deficits noted.  LDA's: L Midline x2. Unable to get blood draw from midline. Primary notified.     Pt taken off Enteric Precautions per Infection Prevention.     Plan to work towards discharge to TCU, see primary note for details. Will notify primary team with any further changes.     Caprice Patel RN

## 2022-08-14 NOTE — PROGRESS NOTES
-    Pulmonary Medicine  Cystic Fibrosis - Lung Transplant Team  Progress Note  2022       Patient: Sofie Rodriguez  MRN: [id number]  : 1960 (age 62 year old)  Transplant: 2022 (Lung), POD#47  Admission date: 2022    Assessment & Plan:     Sofie Rodriguez is a 62 year old female with a PMH significant for end-stage COPD, HTN, HFpEF, Mycobacterium peregrinum colonization, h/o hepatitis C, HECTOR s/p LEEP procedure, and former methamphetamine use. Now s/p BSLT on 22, lungs slightly undersized. Persistent low dose pressor needs post-op through 7/10. Extubated POD #2 but with persistent hypercapnia, mostly compensated and only slightly improved with intermittent BiPAP, discontinued 8/3. Also with bilateral pleural effusions, left radial artery thrombus (presumed secondary to arterial line) s/p thrombectomy , BACILIO, C diff, gastroparesis s/p GJ tube placement in IR , and coffee ground G tube output s/p EGD 8/3 with pyloric ulcer noted. Hemoglobin drop with dark tarry stools . Also with acute rise in LFTs , abdominal US with extrahepatic mass and MRCP with increase in biliary dilation. S/p ERCP  with findings concerning for hemobilia, but no bleeding from ulcer in pyloric channel. Seen by both General Surgery and Surg Onc, no plan for cholecystectomy at this time. Need to clarify overall plan including for anticoagulation.      Recommendations:  - Steady state tacrolimus and repeat DSA (8/15) ordered  - Lasix 20 mg IV X 1 today; CXR ordered for tomorrow  - Wean supplemental oxygen as able, discourage use if >92% SpO2, exercise and overnight oximetry needed prior to discharge  - BLE US prior to discharge (screening for thrombus)  - Prednisone taper due  (not yet ordered)  - IgG  (not yet ordered)  - EBV  (not yet ordered)    S/p bilateral sequential lung transplant (BSLT) for end stage COPD:  Persistent hypercapneic respiratory failure:   Persistent hypotension,  Resolved:   Bilateral hydroPTX:  Right hemidiaphragm palsy: Explant pathology with severe emphysema with subpleural bullae formation, changes of chronic bronchitis, subpleural scars and patchy pulmonary edema; benign hilar lymph nodes. Extubated 6/30. Persistent hypercapnia without dyspnea, appears to be a respiratory drive problem. Sniff (7/14) notable for right hemidiaphragm palsy. Bronch (7/19) with slight graying of mucosa noted at right anastomosis and scant secretions (slightly increased in RLL). Chest CT (7/23) with increased loculated fluid within the left hemithorax with specks of air density in the loculated fluid, similar appearing loculated right hydroPTX with minimal decrease in fluid component, increased size of pleural based lesion in MARLON, and mild subcutaneous emphysema along right chest tube with minimal along tract of removed left chest tube.  S/p left apical chest tube 7/25-8/4, right surgical tube removed 8/6. Bronch (8/2) with normal inspection of anastomoses. NIPPV initially overnight for persistent hypercapnia, stopped 8/3 given lack of improvement with therapy, compensated hypercapnia persists. CMV (8/8) negative. CTA abdomen (8/11) noted similar appearance of bilateral loculated moderate pleural effusions with adjacent compressive atelectasis and LL predominant interlobular septal thickening. S/p right diagnostic thoracentesis (8/12) with 100 ml removed. CXR (8/13) demonstrates small right effusion, improved loculated left effusion. Remains between RA-1L.   - Will give lasix 20 mg IV X 1 today  - CXR ordered for tomorrow   - F/u on pleural fluid cultures (8/12)  - Nebs: levalbuterol and Mucomyst BID   - Aerobika and incentive spirometry hourly while awake  - Wean supplemental oxygen as able, discourage use if >92% SpO2, exercise and overnight oximetry needed prior to discharge  - Will need BLE US prior to discharge (screening for thrombus)     Immunosuppression:  Induction therapy with  basiliximab (and high dose IV steroid).  - Tacrolimus 4 mg BID (decreased 8/12, missed evening dose 8/11, via J tube). Goal level 8-12. Level yesterday 41.3 which was drawn 43 minutes after her morning dose. Level today of 10.6, no dose change will check steady state tomorrow  -  mg BID (increased 8/7, prior decrease for GI symptoms/leukopenia)   - Prednisone 12.5 mg qAM / 10 mg qPM, next taper due 8/18 (not yet ordered)    Date AM dose (mg) PM dose (mg)   8/4/22 12.5 10   8/18/22 10 10   9/15/22 10 7.5   10/13/22 7.5 7.5   11/10/22 7.5 5   12/8/22 5 5   1/5/23 5 2.5      Prophylaxis:   - Dapsone qMWF for PJP ppx (7/18)  - VGCV for CMV ppx, CMV negative 8/8  - Nystatin for oral candidiasis ppx, 6 month course  - See below for serologies and viral ppx:    Donor Recipient Intervention   CMV status Positive Positive Valganciclovir POD #8-90   EBV status Positive Positive EBV monitoring as below   HSV status N/A Positive Not indicated     Positive DSA: Newly positive DSA on 8/10, DQB2 with mfi 2155.   - Repeat DSA 8/15 (ordered)    ID: Donor bronch cultures (OSH) with Strep beta hemolytic (not group A).     H/o M. peregrinum colonization: NGTD post-transplant.  - AFB to be sent on all future bronchs     Streptococcus pneumoniae:  Stenotrophomonas maltophilia: Noted in recipient cultures at time of transplant.  S/p ceftazidime 6/28-7/10, vancomycin 7/7-7/8 and 6/28-6/30, and levofloxacin 7/10-7/12 for total 2 week ABX course.     Hypogammaglobulinemia: IgG adequate at time of transplant, repeat level low (336) on 7/28.  S/p IVIG dosing with premedication 7/30, tolerated well.  IgG on 8/10 low but stable at 590, replacement not indicated.  - Repeat IgG 8/30 (not yet ordered)     H/o hepatitis C:  Diagnosed in 1980s s/p 2m treatment, quant negative since 10/2017, last positive 2/20/17 ([phone]).  Ab positive on 6/2021 with negative HCV PCR.  H/o remote EtOH abuse.  MR elastography (4/27/21) with hepatology review  and consult without any concerns post transplant.  Hepatitis C RNA negative and hepatitis C antibody positive (old) on 6/28.  Repeat hepatitis C RNA negative 8/8 in setting of elevated LFTs.    EBV viremia: EBV level 11k, log 4.1 on 7/28. Not likely to be clinically significant. Repeat EBV (in the setting of elevated LFTs) decreased to 1,501, log 3.2 (8/8).  - EBV monthly monitoring (9/8, not yet ordered)     Other relevant problems managed by primary team:      SVT:   Aflutter with RVR: SVT first noted on 7/14, prior to HD line placement. Continues intermittently.  Aflutter with RVR to 200 7/17 triggered by activity.  EP consulted 7/17 given persistent tachycardia/dysrhythmia. Midodrine discontinued.   - Continue metoprolol  - Primary team to clarify plan for AC     Left hand ischemia: Radial artery thrombosis identified on duplex doppler s/p thrombectomy on 7/2.  Primary team to clarify plan for AC with Vascular.    BACILIO:   Hyperkalemia: Likely multifactorial including medications (Bactrim, tacrolimus) and hypotension. Fludrocortisone 7/6-7/11. S/p non-tunneled HD line 7/14.  Initial iHD run 7/14 limited by afib with RVR vs SVT.  Last iHD run 7/16, line removed 7/22.  Creatinine increased since 7/29 with Diamox and elevated tacrolimus level. Potassium had been stable, elevated intermittently since 8/8. Transitioned to low potassium TF formula 8/8 although intermittently held. K and Cr stable today.  - Tacrolimus monitoring as above  - Florinef (started 8/9, increased 8/12)     Elevated LFTs:   Extrahepatic and intrahepatic biliary dilation: Acute rise in LFTs (alk phos 861, , AST 1,143 and bilirubin 0.9) on 8/8 (prior normal).  Also in setting of increased and different abdominal pain as below. Amylase low, lipase normal. Abdominal US 8/8 with extrahepatic mass at level of common bile duct with associated intrahepatic and common bile duct dilation, patent hepatic vasculature, and layering gallbladder  sludge.  Concern for infection vs malignancy vs fluid collection.  MRCP (8/9) with slight increase in moderate biliary dilation and gall bladder with moderate protein/hemorrhagic material.  Also noted to have cystic foci in the pancreatic head (most consistent with IPMN).  S/p ERCP (8/11) with findings concerning for hemobilia, stent placed across duodenum and in CBD.  CTA abdomen (8/11) without evidence of acute GI bleed. GI team concerned bleeding originating from gallbladder (see note 8/12 for details). Seen by General Surgery and Surg Onc with no plan for cholecystectomy at this time. LFTs yesterday with normal bilirubin and AST, alk phos and ALT elevated but improved.  - LFTs daily  - Will need repeat abdominal CT in 6 months for pancreatic findings  - Primary team to talk to Panc/Bili and General Surgery team for plan     Severe gastroparesis:   Pyloric ulcer: Cramping abdominal pain 7/15.  AXR with large stool burden in colon, no obstruction or distention. CT abdomen (7/15) with moderate to large gastric distention, otherwise without obstruction.  NG placed for LIS with moderate to large output for several days.  Increased abdominal pain 7/17 after clamping for 4 hours.  Gastric emptying study (7/20) with severe gastric emptying delay (95% retention at 4 hours).  S/p GJ tube placement in IR 7/27.  S/p EGD 8/3 for coffee ground G tube output, noted to have pyloric ulcer and excessive gastric fluid and residual food.  Increased abdominal pain/cramping with trial of cycled TF 8/7 to 8/8.  AXR 8/8 without evidence of peritoneal free air or abnormally dilated loops of bowel.  Hemoglobin drop with dark tarry stools 8/8.  S/p repeat EGD (8/11) with mild erythema 2/2 GJ tube trauma seen near insertion site but no active bleeding.  - PPI BID  - Simethicone prn  - TF via J tube, management per RD, GI, and primary team  - G tube to gravity drainage; full liquid diet for comfort only  - Repeat EGD in 8 weeks to check  "healing of ulcer     C diff infection: Abdominal pain with diarrhea noted 7/8, AXR without dilated bowel, moderate colonic stool burden.  C diff positive 7/11.  Loose stools (on tube feeds) stable.  S/p PO vancomycin (7/11-7/28).  Repeat C diff negative 8/6.  - Low threshold to resume vancomycin in the future with ABX course     Hypomagnesemia: Suppressed absorption d/t CNI.   - Continue daily magnesium with replacement protocol prn, schedule oral magnesium supplementation if needed prior to discharge    We appreciate the excellent care provided by the [place] [id number] team.  Recommendations communicated via in person rounding and this note.  Will continue to follow along closely, please do not hesitate to call with any questions or concerns.    Patient discussed with Dr. Castillo.    Kaylin Triana PA-C  Pulmonary CF/Transplant     Subjective & Interval History:     No new pulmonary complaints, going between RA and 1 L O2. Cough is present, mainly dry. This am has some stomach upset after her morning medications, no nausea or vomiting. Notes her stools are loose which is unchanged.     Review of Systems:     C: No fever, no chills, no change in weight, no change in appetite  INTEGUMENTARY/SKIN: No rash or obvious new lesions  ENT/MOUTH: No sore throat, no sinus pain, no nasal congestion or drainage  RESP: See interval history  CV: No chest pain, no palpitations, no peripheral edema, no orthopnea  GI: See above  : No dysuria  MUSCULOSKELETAL: No myalgias, no arthralgias  ENDOCRINE: Blood sugars with adequate control  NEURO: No headache, no numbness or tingling  PSYCHIATRIC: Mood stable    Physical Exam:     All notes, images, and labs from past 24 hours (at minimum) were reviewed.    Vital signs:  Temp: 98.5  F (36.9  C) Temp src: Oral BP: 134/75 Pulse: 106   Resp: 13 SpO2: 98 % O2 Device: Nasal cannula Oxygen Delivery: 1 LPM Height: 157.5 cm (5' 2\") Weight: 67.9 kg (149 lb 9.6 oz)  I/O:     Intake/Output Summary " (Last 24 hours) at 8/12/2022 0936  Last data filed at 8/12/2022 0730  Gross per 24 hour   Intake 1155 ml   Output 800 ml   Net 355 ml     Constitutional: Sitting up in a chair, mildly ill appearing  HEENT: Eyes with pink conjunctivae, anicteric.  Oral mucosa moist without lesions    PULM: Decreased BS, few scattered basilar crackles  CV: Normal S1 and S2.  RRR. No peripheral edema  ABD: NABS, soft.  GJ tube site not visualized  MSK: Moves all extremities  NEURO: Alert, conversant  SKIN: Warm, dry.  No rash on limited exam.  Lateral edges of clamshell incision visualized and intact  PSYCH: Mood stable     Lines, Drains, and Devices:  Midline Catheter Double Lumen (Active)   Site Assessment WDL 08/11/22 2030   External Cath Length (cm) 2 cm 08/11/22 1112   Initial Extremity Circumference (cm) 29 cm 07/28/22 1455   Dressing Intervention Chlorhexidine patch;Securing device;New dressing 08/11/22 1112   Line Necessity Yes, meets criteria 08/11/22 2030   Dressing Change Due 08/18/22 08/11/22 1112   Purple - Status saline locked 08/11/22 2030   Purple - Cap Change Due 08/12/22 08/11/22 0400   Red - Status saline locked 08/11/22 2030   Red - Cap Change Due 08/12/22 08/11/22 0400   Extravasation? No 08/11/22 2030   Number of days: 15     Data:     LABS    CMP:   Recent Labs   Lab 08/14/22  0451 08/13/22  2321 08/13/22  1756 08/13/22  1238 08/13/22  0841 08/12/22  1158 08/12/22  0955 08/12/22  0835 08/12/22  0736 08/11/22  1016 08/11/22  0941 08/11/22  0747 08/11/22  0610 08/10/22  0650 08/10/22  0453 08/09/22  0742 08/09/22  0548 08/08/22  0916 08/08/22  0740   NA  --   --   --   --  144  --   --   --  139  --   --   --  138  --  138  --  136  --  142   POTASSIUM  --   --   --   --  5.2  --   --   --  5.2  --  4.5  --  5.4*   < > 5.7*   < > 5.5*  5.5*   < > 5.5*   CHLORIDE  --   --   --   --  100  --   --   --  99  --   --   --  97*  --  97*  --  95*  --  99   CO2  --   --   --   --  36*  --   --   --  35*  --   --   --  37*   --  38*  --  39*  --  41*   ANIONGAP  --   --   --   --  8  --   --   --  5*  --   --   --  4*  --  3*  --  2*  --  2*   * 234* 277* 178* 139*   < >  --    < > 133*   < >  --    < > 124*   < > 111*   < > 219*   < > 116*   BUN  --   --   --   --  42.4*  --   --   --  42.9*  --   --   --  49.5*  --  46.0*  --  55.6*  --  63.9*   CR  --   --   --   --  1.46*  --   --   --  1.42*  --   --   --  1.50*  --  1.36*  --  1.41*  --  1.23*   GFRESTIMATED  --   --   --   --  41*  --   --   --  42*  --   --   --  39*  --  44*  --  42*  --  50*   ESTUARDO  --   --   --   --  9.1  --   --   --  8.7*  --   --   --  8.9  --  9.0  --  8.9  --  8.9   MAG  --   --   --   --   --   --   --   --  2.1  --   --   --   --   --  2.7*  --  2.4*  --  2.5*   PHOS  --   --   --   --   --   --   --   --  3.9  --   --   --   --   --  4.1  --  3.8  --  3.2   PROTTOTAL  --   --   --   --  5.6*  --  5.6*  --  5.0*  --   --   --  4.9*  --   --   --  4.8*  --  5.0*   ALBUMIN  --   --   --   --  2.8*  --   --   --  2.6*  --   --   --  2.4*  --   --   --  2.5*  --  2.5*   BILITOTAL  --   --   --   --  0.2  --   --   --  0.2  --   --   --  0.2  --   --   --  0.3  --  0.9   ALKPHOS  --   --   --   --  345*  --   --   --  344*  --   --   --  412*  --   --   --  644*  --  861*   AST  --   --   --   --  18  --   --   --  15  --   --   --  19  --   --   --  235*  --  1,143*   ALT  --   --   --   --  120*  --   --   --  147*  --   --   --  215*  --   --   --  601*  --  791*    < > = values in this interval not displayed.     CBC:   Recent Labs   Lab 08/13/22  0841 08/12/22  0955 08/11/22  3769 08/11/22  1659 08/11/22  0610   WBC 7.9 7.4  --  10.2 7.8   RBC 3.05* 2.98*  --  2.76* 2.61*   HGB 9.2* 9.1* 8.1* 8.4* 7.9*   HCT 30.4* 29.5*  --  26.8* 25.3*    99  --  97 97   MCH 30.2 30.5  --  30.4 30.3   MCHC 30.3* 30.8*  --  31.3* 31.2*   RDW 16.3* 16.6*  --  16.6* 16.8*    344  --  342 322       INR:   Recent Labs   Lab 08/12/22  2561  08/11/22  1810 08/09/22  0548 08/08/22  1630   INR 0.93 0.95 0.95 1.07       Glucose:   Recent Labs   Lab 08/14/22  0451 08/13/22  2321 08/13/22  1756 08/13/22  1238 08/13/22  0841 08/13/22  0812   * 234* 277* 178* 139* 130*       Blood Gas: No lab results found in last 7 days.    Culture Data No results for input(s): CULT in the last 168 hours.    Virology Data:   Lab Results   Component Value Date    FLUAH1 Not Detected 06/29/2022    FLUAH3 Not Detected 06/29/2022    SI1097 Not Detected 06/29/2022    IFLUB Not Detected 06/29/2022    RSVA Not Detected 06/29/2022    RSVB Not Detected 06/29/2022    PIV1 Not Detected 06/29/2022    PIV2 Not Detected 06/29/2022    PIV3 Not Detected 06/29/2022    HMPV Not Detected 06/29/2022       Historical CMV results (last 3 of prior testing):  Lab Results   Component Value Date    CMVQNT Not Detected 08/08/2022    CMVQNT Not Detected 07/25/2022     No results found for: CMVLOG    Urine Studies    Recent Labs   Lab Test 08/01/22  0319 07/08/22  0831 07/05/22  1004   URINEPH 8.0* 5.5 5.5   NITRITE Negative Negative Negative   LEUKEST Negative Negative Negative   WBCU  --  1 5       Most Recent [place] Pulmonary Function Testing (FVC/FEV1 only)  FVC-Pre   Date Value Ref Range Status   04/29/2022 1.82 L    11/11/2021 2.17 L    06/14/2021 2.00 L      FVC-%Pred-Pre   Date Value Ref Range Status   04/29/2022 58 %    11/11/2021 70 %    06/14/2021 64 %      FEV1-Pre   Date Value Ref Range Status   04/29/2022 0.51 L    11/11/2021 0.53 L    06/14/2021 0.54 L      FEV1-%Pred-Pre   Date Value Ref Range Status   04/29/2022 20 %    11/11/2021 21 %    06/14/2021 21 %        IMAGING    Recent Results (from the past 48 hour(s))   XR Chest 2 Views    Narrative    PA and lateral chest    HISTORY: Interval follow-up lung transplant pleural effusions    COMPARISON STUDY: 8/9/2022    FINDINGS: Cardiac silhouette is not enlarged. Bilateral loculated  pleural effusions tracking into the apices,  bilateral fissures and  left lower pleural space is again noted. Left axillary PICC. Clamshell  sternotomy wires from bilateral transplants.      Impression    IMPRESSION: No significant change in loculated pleural effusions  bilaterally    JOHANN MORAES MD         SYSTEM ID:  [id number]   XR Surgery LARISA Fluoro Less Than 5 Min w Stills    Narrative    This exam was marked as non-reportable because it will not be read by a   radiologist or a [place] non-radiologist provider.         CTA Abdomen Pelvis with Contrast    Narrative    EXAMINATION: CTA ABDOMEN PELVIS WITH CONTRAST, 8/11/2022 8:44 PM    TECHNIQUE:  Helical CT images from the lung bases through the pubic  symphysis were obtained  with IV contrast.     COMPARISON: Body MR 8/9/2022. Abdominal ultrasound 8/8/2022    HISTORY: hemobilia found on ERCP with stent placement    FINDINGS:  Lung bases:  Similar appearance of bilateral loculated moderate pleural effusions  with adjacent compressive atelectasis and lower lobe predominant  interlobular septal thickening suggestive of pulmonary edema.    Abdomen and pelvis:   There is unremarkable. No focal enhancing lesion. Peripheral  ill-defined areas of enhancement likely secondary to transient hepatic  attenuation differences. No intra or extrahepatic biliary duct  dilation. Spleen size is within normal limits. Postsurgical  cholecystectomy changes. Common bile duct stent and pancreatic duct  stents with small volume pneumobilia. The main pancreatic duct is not  dilated. Homogenous enhancement of the pancreas.    Adrenals are within normal limits. Symmetric renal enhancement. No  hydronephrosis, calcified stone, contour deforming mass. Bladder is  distended and normal in appearance. Bladder is unremarkable.    Percutaneous jejunostomy tube with tip terminating in the proximal  jejunum. No small or large bowel wall thickening or dilation. The  appendix is not well-visualized, however there are no  significant  inflammatory changes in the right lower quadrant. No free air. No  pneumatosis or portal venous gas. Small volume ascites.    Abdominal aorta measure branches are normal in caliber and patent with  moderate predominantly aortoiliac calcifications.    No mesenteric, retroperitoneal, or pelvic lymphadenopathy.    Bones and soft tissues: No suspicious or acute osseous lesion. Soft  tissues are unremarkable.    1.      Impression    IMPRESSION:   2.  Interval placement of common bile and pancreatic duct stents with  small volume pneumobilia. No evidence for acute GI bleed. Contrast  visualized in the distal small bowel from same day ERCP.  3.  No acute findings in the abdomen or pelvis. Small volume free  pelvic fluid.  4.  Similar appearance of bilateral loculated moderate pleural  effusions with adjacent compressive atelectasis and interlobular  septal thickening suggestive for pulmonary edema.    I have personally reviewed the examination and initial interpretation  and I agree with the findings.    JOHANN MORAES MD         SYSTEM ID:  [id number]

## 2022-08-15 ENCOUNTER — APPOINTMENT (OUTPATIENT)
Dept: ULTRASOUND IMAGING | Facility: CLINIC | Age: 62
DRG: 007 | End: 2022-08-15
Attending: STUDENT IN AN ORGANIZED HEALTH CARE EDUCATION/TRAINING PROGRAM
Payer: MEDICARE

## 2022-08-15 ENCOUNTER — APPOINTMENT (OUTPATIENT)
Dept: GENERAL RADIOLOGY | Facility: CLINIC | Age: 62
DRG: 007 | End: 2022-08-15
Attending: PHYSICIAN ASSISTANT
Payer: MEDICARE

## 2022-08-15 ENCOUNTER — APPOINTMENT (OUTPATIENT)
Dept: OCCUPATIONAL THERAPY | Facility: CLINIC | Age: 62
DRG: 007 | End: 2022-08-15
Attending: THORACIC SURGERY (CARDIOTHORACIC VASCULAR SURGERY)
Payer: MEDICARE

## 2022-08-15 ENCOUNTER — APPOINTMENT (OUTPATIENT)
Dept: CARDIOLOGY | Facility: CLINIC | Age: 62
DRG: 007 | End: 2022-08-15
Attending: STUDENT IN AN ORGANIZED HEALTH CARE EDUCATION/TRAINING PROGRAM
Payer: MEDICARE

## 2022-08-15 ENCOUNTER — APPOINTMENT (OUTPATIENT)
Dept: PHYSICAL THERAPY | Facility: CLINIC | Age: 62
DRG: 007 | End: 2022-08-15
Attending: THORACIC SURGERY (CARDIOTHORACIC VASCULAR SURGERY)
Payer: MEDICARE

## 2022-08-15 DIAGNOSIS — Z79.899 ENCOUNTER FOR LONG-TERM (CURRENT) USE OF HIGH-RISK MEDICATION: ICD-10-CM

## 2022-08-15 DIAGNOSIS — Z94.2 LUNG REPLACED BY TRANSPLANT (H): Primary | ICD-10-CM

## 2022-08-15 LAB
ERYTHROCYTE [DISTWIDTH] IN BLOOD BY AUTOMATED COUNT: 16.5 % (ref 10–15)
GLUCOSE BLDC GLUCOMTR-MCNC: 164 MG/DL (ref 70–99)
GLUCOSE BLDC GLUCOMTR-MCNC: 175 MG/DL (ref 70–99)
GLUCOSE BLDC GLUCOMTR-MCNC: 189 MG/DL (ref 70–99)
GLUCOSE BLDC GLUCOMTR-MCNC: 196 MG/DL (ref 70–99)
GLUCOSE BLDC GLUCOMTR-MCNC: 219 MG/DL (ref 70–99)
GLUCOSE BLDC GLUCOMTR-MCNC: 240 MG/DL (ref 70–99)
HCT VFR BLD AUTO: 28.9 % (ref 35–47)
HGB BLD-MCNC: 8.8 G/DL (ref 11.7–15.7)
HOLD SPECIMEN: NORMAL
HOLD SPECIMEN: NORMAL
LVEF ECHO: NORMAL
MCH RBC QN AUTO: 30.8 PG (ref 26.5–33)
MCHC RBC AUTO-ENTMCNC: 30.4 G/DL (ref 31.5–36.5)
MCV RBC AUTO: 101 FL (ref 78–100)
PLATELET # BLD AUTO: 390 10E3/UL (ref 150–450)
RBC # BLD AUTO: 2.86 10E6/UL (ref 3.8–5.2)
TACROLIMUS BLD-MCNC: 14.2 UG/L (ref 5–15)
TME LAST DOSE: NORMAL H
TME LAST DOSE: NORMAL H
WBC # BLD AUTO: 10.5 10E3/UL (ref 4–11)

## 2022-08-15 PROCEDURE — 71046 X-RAY EXAM CHEST 2 VIEWS: CPT | Mod: 26 | Performed by: RADIOLOGY

## 2022-08-15 PROCEDURE — 93325 DOPPLER ECHO COLOR FLOW MAPG: CPT

## 2022-08-15 PROCEDURE — 93970 EXTREMITY STUDY: CPT | Mod: XS

## 2022-08-15 PROCEDURE — 250N000013 HC RX MED GY IP 250 OP 250 PS 637: Performed by: SURGERY

## 2022-08-15 PROCEDURE — 85027 COMPLETE CBC AUTOMATED: CPT | Performed by: STUDENT IN AN ORGANIZED HEALTH CARE EDUCATION/TRAINING PROGRAM

## 2022-08-15 PROCEDURE — 93321 DOPPLER ECHO F-UP/LMTD STD: CPT | Mod: 26 | Performed by: INTERNAL MEDICINE

## 2022-08-15 PROCEDURE — 93325 DOPPLER ECHO COLOR FLOW MAPG: CPT | Mod: 26 | Performed by: INTERNAL MEDICINE

## 2022-08-15 PROCEDURE — 93308 TTE F-UP OR LMTD: CPT

## 2022-08-15 PROCEDURE — 99233 SBSQ HOSP IP/OBS HIGH 50: CPT | Performed by: STUDENT IN AN ORGANIZED HEALTH CARE EDUCATION/TRAINING PROGRAM

## 2022-08-15 PROCEDURE — 250N000013 HC RX MED GY IP 250 OP 250 PS 637: Performed by: INTERNAL MEDICINE

## 2022-08-15 PROCEDURE — 250N000012 HC RX MED GY IP 250 OP 636 PS 637: Performed by: INTERNAL MEDICINE

## 2022-08-15 PROCEDURE — 250N000012 HC RX MED GY IP 250 OP 636 PS 637: Performed by: NURSE PRACTITIONER

## 2022-08-15 PROCEDURE — 250N000009 HC RX 250: Performed by: NURSE PRACTITIONER

## 2022-08-15 PROCEDURE — 93970 EXTREMITY STUDY: CPT | Mod: 26 | Performed by: RADIOLOGY

## 2022-08-15 PROCEDURE — 93931 UPPER EXTREMITY STUDY: CPT | Mod: 26 | Performed by: RADIOLOGY

## 2022-08-15 PROCEDURE — 80197 ASSAY OF TACROLIMUS: CPT | Performed by: PHYSICIAN ASSISTANT

## 2022-08-15 PROCEDURE — 71046 X-RAY EXAM CHEST 2 VIEWS: CPT

## 2022-08-15 PROCEDURE — 86833 HLA CLASS II HIGH DEFIN QUAL: CPT | Performed by: PHYSICIAN ASSISTANT

## 2022-08-15 PROCEDURE — 93931 UPPER EXTREMITY STUDY: CPT | Mod: LT

## 2022-08-15 PROCEDURE — 36592 COLLECT BLOOD FROM PICC: CPT | Performed by: PHYSICIAN ASSISTANT

## 2022-08-15 PROCEDURE — 86832 HLA CLASS I HIGH DEFIN QUAL: CPT | Performed by: PHYSICIAN ASSISTANT

## 2022-08-15 PROCEDURE — 999N000157 HC STATISTIC RCP TIME EA 10 MIN

## 2022-08-15 PROCEDURE — 250N000012 HC RX MED GY IP 250 OP 636 PS 637: Performed by: STUDENT IN AN ORGANIZED HEALTH CARE EDUCATION/TRAINING PROGRAM

## 2022-08-15 PROCEDURE — 250N000013 HC RX MED GY IP 250 OP 250 PS 637: Performed by: STUDENT IN AN ORGANIZED HEALTH CARE EDUCATION/TRAINING PROGRAM

## 2022-08-15 PROCEDURE — 250N000013 HC RX MED GY IP 250 OP 250 PS 637: Performed by: NURSE PRACTITIONER

## 2022-08-15 PROCEDURE — 99233 SBSQ HOSP IP/OBS HIGH 50: CPT | Mod: 24 | Performed by: NURSE PRACTITIONER

## 2022-08-15 PROCEDURE — 94640 AIRWAY INHALATION TREATMENT: CPT

## 2022-08-15 PROCEDURE — 97535 SELF CARE MNGMENT TRAINING: CPT | Mod: GO

## 2022-08-15 PROCEDURE — 93970 EXTREMITY STUDY: CPT

## 2022-08-15 PROCEDURE — 250N000013 HC RX MED GY IP 250 OP 250 PS 637: Performed by: HOSPITALIST

## 2022-08-15 PROCEDURE — 120N000002 HC R&B MED SURG/OB UMMC

## 2022-08-15 PROCEDURE — 93308 TTE F-UP OR LMTD: CPT | Mod: 26 | Performed by: INTERNAL MEDICINE

## 2022-08-15 PROCEDURE — 97110 THERAPEUTIC EXERCISES: CPT | Mod: GP

## 2022-08-15 RX ORDER — NALOXONE HYDROCHLORIDE 0.4 MG/ML
0.2 INJECTION, SOLUTION INTRAMUSCULAR; INTRAVENOUS; SUBCUTANEOUS
Status: DISCONTINUED | OUTPATIENT
Start: 2022-08-15 | End: 2022-08-17 | Stop reason: HOSPADM

## 2022-08-15 RX ORDER — SIMETHICONE 40MG/0.6ML
40 SUSPENSION, DROPS(FINAL DOSAGE FORM)(ML) ORAL EVERY 6 HOURS PRN
Qty: 45 ML | Refills: 0 | Status: SHIPPED | OUTPATIENT
Start: 2022-08-15 | End: 2022-09-08

## 2022-08-15 RX ORDER — DAPSONE 25 MG/1
50 TABLET ORAL
Qty: 24 TABLET | Refills: 0 | Status: SHIPPED | OUTPATIENT
Start: 2022-08-17 | End: 2022-08-16

## 2022-08-15 RX ORDER — FLUDROCORTISONE ACETATE 0.1 MG/1
0.1 TABLET ORAL DAILY
Qty: 30 TABLET | Refills: 0 | Status: SHIPPED | OUTPATIENT
Start: 2022-08-16 | End: 2022-09-02

## 2022-08-15 RX ORDER — ONDANSETRON 4 MG/1
4 TABLET, ORALLY DISINTEGRATING ORAL EVERY 6 HOURS PRN
Qty: 30 TABLET | Refills: 0 | Status: SHIPPED | OUTPATIENT
Start: 2022-08-15 | End: 2023-01-11

## 2022-08-15 RX ORDER — METOPROLOL TARTRATE 50 MG
50 TABLET ORAL 2 TIMES DAILY
Qty: 60 TABLET | Refills: 0 | Status: SHIPPED | OUTPATIENT
Start: 2022-08-15 | End: 2022-09-02

## 2022-08-15 RX ORDER — AMINO AC/PROTEIN HYDR/WHEY PRO 10G-100/30
1 LIQUID (ML) ORAL DAILY
Qty: 30 PACKET | Refills: 0 | Status: SHIPPED | OUTPATIENT
Start: 2022-08-16 | End: 2022-09-02

## 2022-08-15 RX ORDER — NALOXONE HYDROCHLORIDE 0.4 MG/ML
0.4 INJECTION, SOLUTION INTRAMUSCULAR; INTRAVENOUS; SUBCUTANEOUS
Status: DISCONTINUED | OUTPATIENT
Start: 2022-08-15 | End: 2022-08-17 | Stop reason: HOSPADM

## 2022-08-15 RX ORDER — OXYCODONE HYDROCHLORIDE 5 MG/1
5-10 TABLET ORAL EVERY 4 HOURS PRN
Qty: 25 TABLET | Refills: 0 | Status: SHIPPED | OUTPATIENT
Start: 2022-08-15 | End: 2022-08-24

## 2022-08-15 RX ADMIN — OXYCODONE HYDROCHLORIDE 10 MG: 5 TABLET ORAL at 22:09

## 2022-08-15 RX ADMIN — METOPROLOL TARTRATE 50 MG: 50 TABLET, FILM COATED ORAL at 19:32

## 2022-08-15 RX ADMIN — Medication 40 MG: at 20:48

## 2022-08-15 RX ADMIN — OXYCODONE HYDROCHLORIDE 10 MG: 5 TABLET ORAL at 06:15

## 2022-08-15 RX ADMIN — OXYCODONE HYDROCHLORIDE 10 MG: 5 TABLET ORAL at 15:42

## 2022-08-15 RX ADMIN — Medication 1 PACKET: at 12:11

## 2022-08-15 RX ADMIN — CALCIUM CARBONATE 600 MG (1,500 MG)-VITAMIN D3 400 UNIT TABLET 1 TABLET: at 08:59

## 2022-08-15 RX ADMIN — MYCOPHENOLATE MOFETIL 750 MG: 200 POWDER, FOR SUSPENSION ORAL at 20:48

## 2022-08-15 RX ADMIN — CALCIUM CARBONATE 600 MG (1,500 MG)-VITAMIN D3 400 UNIT TABLET 1 TABLET: at 18:31

## 2022-08-15 RX ADMIN — PREDNISONE 10 MG: 10 TABLET ORAL at 19:32

## 2022-08-15 RX ADMIN — VALGANCICLOVIR HYDROCHLORIDE 450 MG: 50 POWDER, FOR SOLUTION ORAL at 08:59

## 2022-08-15 RX ADMIN — PREDNISONE 12.5 MG: 2.5 TABLET ORAL at 08:58

## 2022-08-15 RX ADMIN — TACROLIMUS 3.5 MG: 5 CAPSULE ORAL at 18:33

## 2022-08-15 RX ADMIN — THERA TABS 1 TABLET: TAB at 12:09

## 2022-08-15 RX ADMIN — LEVALBUTEROL HYDROCHLORIDE 1.25 MG: 1.25 SOLUTION RESPIRATORY (INHALATION) at 09:25

## 2022-08-15 RX ADMIN — Medication 40 MG: at 08:59

## 2022-08-15 RX ADMIN — INSULIN ASPART 1 UNITS: 100 INJECTION, SOLUTION INTRAVENOUS; SUBCUTANEOUS at 09:02

## 2022-08-15 RX ADMIN — INSULIN ASPART 2 UNITS: 100 INJECTION, SOLUTION INTRAVENOUS; SUBCUTANEOUS at 00:02

## 2022-08-15 RX ADMIN — FLUDROCORTISONE ACETATE 0.1 MG: 0.1 TABLET ORAL at 08:59

## 2022-08-15 RX ADMIN — INSULIN ASPART 3 UNITS: 100 INJECTION, SOLUTION INTRAVENOUS; SUBCUTANEOUS at 20:55

## 2022-08-15 RX ADMIN — NYSTATIN: 100000 CREAM TOPICAL at 09:04

## 2022-08-15 RX ADMIN — INSULIN ASPART 4 UNITS: 100 INJECTION, SOLUTION INTRAVENOUS; SUBCUTANEOUS at 15:52

## 2022-08-15 RX ADMIN — METOPROLOL TARTRATE 50 MG: 50 TABLET, FILM COATED ORAL at 08:59

## 2022-08-15 RX ADMIN — INSULIN ASPART 5 UNITS: 100 INJECTION, SOLUTION INTRAVENOUS; SUBCUTANEOUS at 04:18

## 2022-08-15 RX ADMIN — NYSTATIN: 100000 CREAM TOPICAL at 20:49

## 2022-08-15 RX ADMIN — DAPSONE 50 MG: 25 TABLET ORAL at 12:09

## 2022-08-15 RX ADMIN — TACROLIMUS 4 MG: 5 CAPSULE ORAL at 09:07

## 2022-08-15 RX ADMIN — ACETYLCYSTEINE 2 ML: 200 INHALANT RESPIRATORY (INHALATION) at 09:26

## 2022-08-15 RX ADMIN — MYCOPHENOLATE MOFETIL 750 MG: 200 POWDER, FOR SUSPENSION ORAL at 09:06

## 2022-08-15 RX ADMIN — OXYCODONE HYDROCHLORIDE 10 MG: 5 TABLET ORAL at 00:58

## 2022-08-15 RX ADMIN — INSULIN ASPART 2 UNITS: 100 INJECTION, SOLUTION INTRAVENOUS; SUBCUTANEOUS at 12:08

## 2022-08-15 ASSESSMENT — ACTIVITIES OF DAILY LIVING (ADL)
ADLS_ACUITY_SCORE: 30

## 2022-08-15 NOTE — PROGRESS NOTES
Brief Pancreas/biliary GI note:     Reviewed surgical oncology's note that patient is high risk for surgical intervention given transplant, deconditioning and nutrition status.     Based on imaging and ERCP evaluation, we do not have current concerns for cholangiocarcinoma. With hemobilia seen on ERCP, our concern was for gallbladder pathology. Given patient is not a surgical candidate currently, it is reasonable for patient to have follow up ERCP in 2 months.     Per primary team and chart review, pt discharging within the next few days. Hemoglobin stable and no further evidence of GI bleeding.       Recommendations   - Pancreas/biliary team will arrange follow up ERCP in 2 months with Dr. Arroyo for follow up of biliary stent     Plan discussed with attending, Dr. Cosmo Arroyo.     Pancrease/biliary GI team will sign off.     Tenzin Arroyo MD  Gastroenterology Fellow - PGY4  [facility]   Page Me

## 2022-08-15 NOTE — PLAN OF CARE
"Goal Outcome Evaluation:    Plan of Care Reviewed With: patient, daughter     Outcome Evaluation: Tolerating RA for several hours at a time today. Tolerating TF at goal rate.    NURSING PROGRESS NOTE  Shift Summary        Date: August 15, 2022     Pertinent Updates/Shift Summary:  No acute events. Upper and lower extremity US and echo completed.    Please see Nursing Flowsheets for full assessment details, I&Os, and VS.      Most Recent Vitals & Weight:   /76   Pulse 103   Temp 99  F (37.2  C)   Resp 19   Ht 1.575 m (5' 2\")   Wt 69.5 kg (153 lb 4.8 oz)   SpO2 99%   BMI 28.04 kg/m       Wt Readings from Last 2 Encounters:   08/15/22 69.5 kg (153 lb 4.8 oz)   04/29/22 63.5 kg (140 lb)          Plan:  Possible discharge Wednesday. Continue with plan of care. Notify primary team with any changes.         Radha Manuel RN  .................................................... August 15, 2022   6:11 PM  [facility] ([facility]): [place]  Stepdown ICU (Unit 6D)    "

## 2022-08-15 NOTE — PROGRESS NOTES
Pulmonary Medicine  Cystic Fibrosis - Lung Transplant Team  Progress Note  08/15/2022       Patient: Sofie Rodriguez  MRN: [id number]  : 1960 (age 62 year old)  Transplant: 2022 (Lung), POD#48  Admission date: 2022    Assessment & Plan:     Sofie Rodriguez is a 62 year old female with a PMH significant for end-stage COPD, HTN, HFpEF, Mycobacterium peregrinum colonization, h/o hepatitis C, HECTOR s/p LEEP procedure, and former methamphetamine use.  S/p BSLT on 22 (lungs slightly undersized), extubated POD #2 but with persistent hypercapnia, mostly compensated and only slightly improved with intermittent NIPPV, discontinued 8/3.  Post-operative course complicated by bilateral pleural effusions, left radial artery thrombus (presumed secondary to arterial line) s/p thrombectomy , BACILIO, C diff, gastroparesis s/p GJ tube placement in IR , coffee ground G tube output s/p EGD 8/3 with pyloric ulcer noted, melena with hgb drop (), elevated LFTs () with extrahepatic mass on US and MRCP with increase in biliary dilation.  S/p ERCP  with findings concerning for hemobilia, but no bleeding from ulcer in pyloric channel.  Being managed by both General Surgery and Surg Onc, no plan for cholecystectomy at this time.        Today's recommendations:  - CXR today  - Nebs discontinued today  - Wean supplemental oxygen and discourage use if >92% SpO2, exercise and overnight oximetry needed prior to discharge (ordered for tomorrow)  - Peripheral US (BUE and BLE) to screen for thrombus prior to discharge  - DSA pending  - Following pending pleural cultures  - IgG due , EBV due   - Recommend minimal intake d/t need for increased pain meds with increased PO  - Repeat LUE arterial US today     S/p bilateral sequential lung transplant (BSLT) for end stage COPD:  Persistent hypercapneic respiratory failure:   Persistent hypotension, Resolved:   Bilateral hydroPTX:  Right hemidiaphragm palsy: Explant  pathology with severe emphysema with subpleural bullae formation, changes of chronic bronchitis, subpleural scars and patchy pulmonary edema; benign hilar lymph nodes.  Extubated 6/30 but with persistent hypercapnia without dyspnea, appears to be a respiratory drive problem.  Sniff (7/14) notable for right hemidiaphragm palsy.  S/p left apical chest tube 7/25-8/4, right surgical tube removed 8/6.  Bronch (8/2) with normal inspection of anastomoses.  NIPPV initially overnight for persistent hypercapnia, stopped 8/3 given lack of improvement with therapy, compensated hypercapnia persists.  CMV (8/8) negative.  CTA abdomen (8/11) noted similar appearance of bilateral loculated moderate pleural effusions with adjacent compressive atelectasis and LL predominant interlobular septal thickening.  S/p right diagnostic thoracentesis (8/12) with 100 ml removed.  CXR (8/13) demonstrates small right effusion, improved loculated left effusion.  Continues to use 1L NC for comfort without evidence of hypoxia.   - CXR today with stable bilateral pleural effusions (personally reviewed)  - Nebs: levalbuterol and Mucomyst BID discontinued today  - Aerobika and incentive spirometry hourly while awake  - Wean supplemental oxygen and discourage use if >92% SpO2, exercise and overnight oximetry needed prior to discharge (ordered for tomorrow)  - Diuresis per primary  - Peripheral US (BUE and BLE) to screen for thrombus prior to discharge     Immunosuppression:  Induction therapy with basiliximab (and high dose IV steroid).  - Tacrolimus 4 mg BID (decreased 8/12, missed evening dose 8/11, via J tube).  Goal level 8-12.  Level today 14.2, dose decreased to 3.5 mg BID (ensure medication is consistently via J tube).  Repeat level 8/18 (ordered).  -  mg BID (increased 8/7, prior decrease for GI symptoms/leukopenia)   - Prednisone 12.5 mg qAM / 10 mg qPM, next taper due 8/18 (not yet ordered)  Date AM dose (mg) PM dose (mg)   8/4/22 12.5  10   8/18/22 10 10   9/15/22 10 7.5   10/13/22 7.5 7.5   11/10/22 7.5 5   12/8/22 5 5   1/5/23 5 2.5      Prophylaxis:   - Dapsone qMWF for PJP ppx  - VGCV for CMV ppx, CMV monthly (negative 8/8)  - Nystatin for oral candidiasis ppx, 6 month course  - See below for serologies and viral ppx:    Donor Recipient Intervention   CMV status Positive Positive Valganciclovir POD #8-90   EBV status Positive Positive EBV monitoring as below   HSV status N/A Positive Not indicated      Positive DSA: Newly positive DSA on 8/10, DQB2 with mfi 2155.   - Repeat DSA pending (8/15)     ID: Donor bronch cultures (OSH) with Strep beta hemolytic (not group A).  - Pleural fluid cultures (8/12) NGTD     H/o M. peregrinum colonization: NGTD post-transplant.  - AFB to be sent on all future bronchs     Streptococcus pneumoniae:  Stenotrophomonas maltophilia: Noted in recipient cultures at time of transplant.  S/p ceftazidime 6/28-7/10, vancomycin 7/7-7/8 and 6/28-6/30, and levofloxacin 7/10-7/12 for total 2 week ABX course.     Hypogammaglobulinemia: IgG adequate at time of transplant, repeat level low (336) on 7/28.  S/p IVIG dosing with premedication 7/30, tolerated well.  IgG on 8/10 low but stable at 590, replacement not indicated.  - Repeat IgG due 8/30     H/o hepatitis C:  Diagnosed in 1980s s/p 2m treatment, quant negative since 10/2017, last positive 2/20/17 ([phone]).  Ab positive on 6/2021 with negative HCV PCR.  H/o remote EtOH abuse.  MR elastography (4/27/21) with hepatology review and consult without any concerns post transplant.  Hepatitis C RNA negative and hepatitis C antibody positive (old) on 6/28.  Repeat hepatitis C RNA negative 8/8 in setting of elevated LFTs.     EBV viremia: EBV level 11k, log 4.1 on 7/28.  Not likely to be clinically significant.  Repeat EBV (in the setting of elevated LFTs) decreased to 1,501, log 3.2 (8/8).  - EBV monthly monitoring due 9/8     Other relevant problems managed by primary  team:      SVT:   Aflutter with RVR: SVT first noted on 7/14, prior to HD line placement, continues intermittently.  Aflutter with RVR to 200 7/17 triggered by activity.  EP consulted 7/17 given persistent tachycardia/dysrhythmia, midodrine discontinued.  AC deferred per EP given bleeding risk.  On metoprolol.     Left hand ischemia: Radial artery thrombosis identified on duplex doppler s/p thrombectomy on 7/2.  Primary team to clarify plan for AC with Vascular.  - Repeat LUE arterial US today     BACILIO:   Hyperkalemia: Likely multifactorial including medications (Bactrim, tacrolimus) and hypotension.  S/p non-tunneled HD line 7/14 with iHD 7/14-7/16, line removed 7/22.  Creatinine increased since 7/29 with Diamox and elevated tacrolimus level.  Potassium had been stable, elevated intermittently since 8/8.  Transitioned to low potassium TF formula 8/8 although intermittently held d/t GI symptoms  - Tacrolimus monitoring as above  - Florinef (started 8/9, increased 8/12)     Elevated LFTs:   Extrahepatic and intrahepatic biliary dilation: Acute rise in LFTs (alk phos 861, , AST 1,143 and bilirubin 0.9) on 8/8 (prior normal) associated with increased and different abdominal pain as below.  Amylase low, lipase normal.  Abdominal US (8/8) with extrahepatic mass at level of common bile duct with associated intrahepatic and common bile duct dilation, patent hepatic vasculature, and layering gallbladder sludge.  Concern for infection vs malignancy vs fluid collection.  MRCP (8/9) with slight increase in moderate biliary dilation and gall bladder with moderate protein/hemorrhagic material.  Also noted to have cystic foci in the pancreatic head (most consistent with IPMN).  S/p ERCP (8/11) with findings concerning for hemobilia, stent placed across duodenum and in CBD.  CTA abdomen (8/11) without evidence of acute GI bleed.  GI team concerned bleeding originating from gallbladder (see note 8/12 for details).  Hepatic  panel stable/improving.    - Repeat abdominal CT in 6 months for pancreatic findings (~2/11)  - Management per primary team with consult by Panc/Bili, General Surgery, and Surg Onc     Severe gastroparesis:   Pyloric ulcer: Cramping abdominal pain 7/15.  AXR with large stool burden in colon, no obstruction or distention.  CT abdomen (7/15) with moderate to large gastric distention, otherwise without obstruction.  NG placed for LIS with moderate to large output for several days, did not tolerate clamping.  Gastric emptying study (7/20) with severe gastric emptying delay (95% retention at 4 hours), s/p GJ tube placement in IR 7/27.  S/p EGD 8/3 for coffee ground G tube output, noted to have pyloric ulcer and excessive gastric fluid and residual food.  Increased abdominal pain/cramping with trial of cycled TF 8/7 to 8/8.  AXR 8/8 without evidence of peritoneal free air or abnormally dilated loops of bowel.  Hemoglobin drop with dark tarry stools 8/8.  S/p repeat EGD (8/11) with mild erythema 2/2 GJ tube trauma seen near insertion site but no active bleeding.  - PPI BID  - Simethicone prn  - TF and ALL medications via J tube  - G tube to gravity drainage; full liquid diet for comfort only (recommend minimal intake d/t need for increased pain meds with increased PO)  - Repeat EGD in 8 weeks (~10/6) to check healing of ulcer     C diff infection: Abdominal pain with diarrhea noted 7/8, AXR without dilated bowel, moderate colonic stool burden.  C diff positive 7/11.  Loose stools (on tube feeds) stable.  S/p PO vancomycin (7/11-7/28).  Repeat C diff negative 8/6.  Low threshold to resume vancomycin in the future with ABX course     Hypomagnesemia: Suppressed absorption d/t CNI.  Continue daily magnesium with replacement protocol prn.     We appreciate the excellent care provided by the [place] [id number] team.  Recommendations communicated via in person rounding and this note.  Will continue to follow along closely, please  "do not hesitate to call with any questions or concerns.    Patient discussed with Dr. Miller.    Catherine Johnson, DNP, APRN, CNP  Inpatient Nurse Practitioner  Pulmonary CF/Transplant     Subjective & Interval History:     Using 1L NC for comfort, easily weaned to RA.  Minimal cough and sputum, dyspnea with ambulation attributed to deconditioning.  Eating very little, intermittent abdominal discomfort/cramping.  Intermittent ST to 110's.    Review of Systems:     C: No fever, no chills  INTEGUMENTARY/SKIN: No rash or obvious new lesions  ENT/MOUTH: No nasal congestion or drainage  RESP: See interval history  CV: No chest pain, no palpitations, no peripheral edema, no orthopnea  GI: + intermittent nausea, no vomiting, + stable loose stools, no reflux symptoms  : No dysuria  MUSCULOSKELETAL: No myalgias, no arthralgias  ENDOCRINE: Blood sugars intermittently elevated  NEURO: No headache, no numbness or tingling  PSYCHIATRIC: Mood stable    Physical Exam:     All notes, images, and labs from past 24 hours (at minimum) were reviewed.    Vital signs:  Temp: 98.4  F (36.9  C) Temp src: Oral BP: (!) 137/92 Pulse: 101   Resp: 17 SpO2: 100 % O2 Device: Nasal cannula Oxygen Delivery: 1 LPM Height: 157.5 cm (5' 2\") Weight: 67.9 kg (149 lb 9.6 oz)  I/O:     Intake/Output Summary (Last 24 hours) at 8/15/2022 0811  Last data filed at 8/15/2022 0700  Gross per 24 hour   Intake 915 ml   Output 400 ml   Net 515 ml     Constitutional: Sitting up in a chair, in no apparent distress.   HEENT: Eyes with pink conjunctivae, anicteric.  Oral mucosa moist without lesions.   PULM: Non-labored breathing on 1L NC --> weaned to RA without issue.  CV: Tachycardia.  No peripheral edema.   ABD: NABS, soft, nontender, nondistended.  PEG/J tube site cdi.  MSK: Moves all extremities.  No apparent muscle wasting.   NEURO: Alert and oriented, conversant.   SKIN: Warm, dry.  No rash on limited exam.   PSYCH: Mood stable.     Lines, Drains, and " Devices:  Midline Catheter Double Lumen (Active)   Site Assessment WDL 08/15/22 0400   External Cath Length (cm) 1 cm 08/14/22 0903   Initial Extremity Circumference (cm) 29 cm 07/28/22 1455   Dressing Intervention Chlorhexidine patch;Transparent 08/14/22 2000   Line Necessity Yes, meets criteria 08/15/22 0400   Dressing Change Due 08/21/22 08/14/22 2000   Purple - Status saline locked 08/15/22 0400   Purple - Cap Change Due 08/16/22 08/14/22 2000   Red - Status saline locked 08/15/22 0400   Red - Cap Change Due 08/16/22 08/14/22 2000   Extravasation? No 08/14/22 2000   Number of days: 18     Data:     LABS    CMP:   Recent Labs   Lab 08/15/22  0416 08/15/22  0001 08/14/22  1944 08/14/22  1725 08/14/22  0927 08/14/22  0653 08/13/22  1238 08/13/22  0841 08/12/22  1158 08/12/22  0955 08/12/22  0835 08/12/22  0736 08/11/22  1016 08/11/22  0941 08/11/22  0747 08/11/22  0610 08/10/22  0650 08/10/22  0453 08/09/22  0742 08/09/22  0548   NA  --   --   --   --   --  143  --  144  --   --   --  139  --   --   --  138  --  138  --  136   POTASSIUM  --   --   --   --   --  4.2  --  5.2  --   --   --  5.2  --  4.5  --  5.4*   < > 5.7*   < > 5.5*  5.5*   CHLORIDE  --   --   --   --   --  101  --  100  --   --   --  99  --   --   --  97*  --  97*  --  95*   CO2  --   --   --   --   --  35*  --  36*  --   --   --  35*  --   --   --  37*  --  38*  --  39*   ANIONGAP  --   --   --   --   --  7  --  8  --   --   --  5*  --   --   --  4*  --  3*  --  2*   * 189* 216* 207*   < > 152*   < > 139*   < >  --    < > 133*   < >  --    < > 124*   < > 111*   < > 219*   BUN  --   --   --   --   --  50.2*  --  42.4*  --   --   --  42.9*  --   --   --  49.5*  --  46.0*  --  55.6*   CR  --   --   --   --   --  1.58*  --  1.46*  --   --   --  1.42*  --   --   --  1.50*  --  1.36*  --  1.41*   GFRESTIMATED  --   --   --   --   --  37*  --  41*  --   --   --  42*  --   --   --  39*  --  44*  --  42*   ESTUARDO  --   --   --   --   --  8.8  --  9.1   --   --   --  8.7*  --   --   --  8.9  --  9.0  --  8.9   MAG  --   --   --   --   --   --   --   --   --   --   --  2.1  --   --   --   --   --  2.7*  --  2.4*   PHOS  --   --   --   --   --   --   --   --   --   --   --  3.9  --   --   --   --   --  4.1  --  3.8   PROTTOTAL  --   --   --   --   --  5.2*  --  5.6*  --  5.6*  --  5.0*  --   --   --  4.9*  --   --   --  4.8*   ALBUMIN  --   --   --   --   --  2.6*  --  2.8*  --   --   --  2.6*  --   --   --  2.4*  --   --   --  2.5*   BILITOTAL  --   --   --   --   --  <0.2  --  0.2  --   --   --  0.2  --   --   --  0.2  --   --   --  0.3   ALKPHOS  --   --   --   --   --  278*  --  345*  --   --   --  344*  --   --   --  412*  --   --   --  644*   AST  --   --   --   --   --  18  --  18  --   --   --  15  --   --   --  19  --   --   --  235*   ALT  --   --   --   --   --  90*  --  120*  --   --   --  147*  --   --   --  215*  --   --   --  601*    < > = values in this interval not displayed.     CBC:   Recent Labs   Lab 08/14/22  1122 08/13/22  0841 08/12/22  0955 08/11/22  0443 08/11/22  1657   WBC 10.4 7.9 7.4  --  10.2   RBC 3.12* 3.05* 2.98*  --  2.76*   HGB 9.4* 9.2* 9.1* 8.1* 8.4*   HCT 31.8* 30.4* 29.5*  --  26.8*   * 100 99  --  97   MCH 30.1 30.2 30.5  --  30.4   MCHC 29.6* 30.3* 30.8*  --  31.3*   RDW 16.7* 16.3* 16.6*  --  16.6*    422 344  --  342       INR:   Recent Labs   Lab 08/12/22  1657 08/11/22  1810 08/09/22  0548 08/08/22  1630   INR 0.93 0.95 0.95 1.07       Glucose:   Recent Labs   Lab 08/15/22  0416 08/15/22  0001 08/14/22  1944 08/14/22  1725 08/14/22  1130 08/14/22  0927   * 189* 216* 207* 145* 186*       Blood Gas: No lab results found in last 7 days.    Culture Data No results for input(s): CULT in the last 168 hours.    Virology Data:   Lab Results   Component Value Date    FLUAH1 Not Detected 06/29/2022    FLUAH3 Not Detected 06/29/2022    SY5676 Not Detected 06/29/2022    IFLUB Not Detected 06/29/2022    RSVA Not  Detected 06/29/2022    RSVB Not Detected 06/29/2022    PIV1 Not Detected 06/29/2022    PIV2 Not Detected 06/29/2022    PIV3 Not Detected 06/29/2022    HMPV Not Detected 06/29/2022       Historical CMV results (last 3 of prior testing):  Lab Results   Component Value Date    CMVQNT Not Detected 08/08/2022    CMVQNT Not Detected 07/25/2022     No results found for: CMVLOG    Urine Studies    Recent Labs   Lab Test 08/01/22  0319 07/08/22  0831 07/05/22  1004   URINEPH 8.0* 5.5 5.5   NITRITE Negative Negative Negative   LEUKEST Negative Negative Negative   WBCU  --  1 5       Most Recent [place] Pulmonary Function Testing (FVC/FEV1 only)  FVC-Pre   Date Value Ref Range Status   04/29/2022 1.82 L    11/11/2021 2.17 L    06/14/2021 2.00 L      FVC-%Pred-Pre   Date Value Ref Range Status   04/29/2022 58 %    11/11/2021 70 %    06/14/2021 64 %      FEV1-Pre   Date Value Ref Range Status   04/29/2022 0.51 L    11/11/2021 0.53 L    06/14/2021 0.54 L      FEV1-%Pred-Pre   Date Value Ref Range Status   04/29/2022 20 %    11/11/2021 21 %    06/14/2021 21 %        IMAGING    Recent Results (from the past 48 hour(s))   XR Chest 2 Views    Narrative    PA and lateral chest    HISTORY: Follow-up lung transplant    COMPARISON STUDY: 8/12/2022    FINDINGS: Cardiac silhouette is nonenlarged. Bilateral loculated  pleural effusions left greater than right unchanged. Left axillary  PICC      Impression    IMPRESSION: No significant change in loculated bilateral pleural  effusions left greater than right.    JOHANN MORAES MD         SYSTEM ID:  [id number]

## 2022-08-15 NOTE — PLAN OF CARE
Major Shift Events:      Requires PRN oxy X2 over PM; notes discomfort from lying in bed. On BS sliding scale with Q4 insulin requirements. Unsure of discharge date in near future.    For vital signs and complete assessments, please see documentation flowsheets.     Problem: Pain Acute  Goal: Acceptable Pain Control and Functional Ability  Outcome: Ongoing, Progressing  Intervention: Prevent or Manage Pain  Recent Flowsheet Documentation  Taken 8/15/2022 0400 by Guicho Carmen, RN  Bowel Elimination Promotion: adequate fluid intake promoted  Medication Review/Management: medications reviewed  Taken 8/15/2022 0000 by Guicho Carmen, RN  Bowel Elimination Promotion: adequate fluid intake promoted  Medication Review/Management: medications reviewed  Taken 8/14/2022 2000 by Guicho Carmen, RN  Bowel Elimination Promotion: adequate fluid intake promoted  Medication Review/Management: medications reviewed

## 2022-08-15 NOTE — PROGRESS NOTES
"SPIRITUAL HEALTH SERVICES  SPIRITUAL ASSESSMENT Progress Note  [facility] ([place] 6D     Referral Source: Follow-up visit     PRIMARY FOCUS:     Support for coping    Reviewed documentation. Reflective conversation shared with Sofie which integrated elements of illness and family narratives. Oriented Sofie to spiritual health services.    Context of Serious Illness/Symptom(s) - Sofie shared that she had a double lung transplant which went well but she has had some complications which have kept her in the hospital. She shared she is hoping to discharge this week.     Resources for Support - Sofie named her children as sources of support.    Sofie shared that she is hoping to discharge this week. She expressed feeling ready to discharge and feels that she will be well supported by her children at discharge.     Sofie is [employer]. She stated she doesn't attend [employer] frequently. She feels God has been walking beside her during this journey. She stated that God gives her strength when she has felt like it is \"too much\". Prayer was offered per her request.     Plan: I will continue to follow to provide emotional/spiritual support while Sofie remains on 6A.     Spiritual health services remains available for any follow-up or requests.     [facility] remains available 24/7 for emergent requests/referrals, either by having the switchboard page the on-call  or by entering an ASAP/STAT consult in Epic (this will also page the on-call ).      __    Rabbi Blair Yip  Chaplain Resident  Pager [phone]    "

## 2022-08-15 NOTE — PROGRESS NOTES
[facility]    Medicine Progress Note - Hospitalist Service, GOLD TEAM 10    Date of Admission:  6/28/2022    Assessment & Plan    Sofie Rodriguez is a 62 year old female admitted on 6/28/2022. She has PMH of end stage COPD s/p b/l lung transplant on 6/28/2022, HTN, HFpEF, h/o hepC, osteopenia, HECTOR s/p LEEP, and former methamphetamine use, and she was transferred to [address] from MICU on 7/15/2022. She was admitted to the MICU on 7/13/20222 with prior hospital course complicated by left upper extremity acute limb ischemia s/p left radial thrombectomy on 7/1/2022. She was primarily treated for acute hypercapnic respiratory failure on intermittent BIPAP with worsening BACILIO while in the MICU. Breathing has improved, O2 requirements have decreased/stabilized, but patient has severely delayed gastric emptying found on NM study. PEGJ placed. Chest tubes out. Still needing to advance feeds. GI following, s/p abdominal MRI which revealed protinaceous /hemmorhagic GB cyst. She is s/p ERCP and EGD 8/11/22, which revealed hemobilia and some blood in 2nd and 3d portion of the duodenum w/o active source of bleeding, GI placed stent into CBD. Follow up CT abdomen did not reveal acute GI bleed.      Today's Plan:  - awaiting gen surg recs re: GB and outpatient follow up  - discussed with EP fellow 8/14, they will see today and drop note  - echo today stable (ordered due to more pronounced murmur)  - US DVT 4 extremeties and L arterial for follow up  - anticipate discharge in next 2-3 days, sent non-transplant meds to pharmacy  - adjust insulin after resumption of glargine 8/15  - ok for small amounts of full liquids per pulm (in setting of severe gastroparesis history)  - nystatin x6 months per pulm  - Discharge on ziopatch for 14 days with EP follow up in 1-2 months after    Hemobilia s/p ERCP and CBD stent  Acute Hepatitis - resolved  Dramatic rise in LFT's-- AST > ALT; ALT  700s, AST 1000s; bili rising 0.9 from 0.2. No fever. MRCP: gallbladder contains a moderate amount of proteinaceous/hemorrhagic material, which extended into the gallbladder neck. Patient had ERCP and EGD on 8/11, no active bleeding of the pyloric channel ulcer was noted. However,  Panc/bili team did notice large amount of blood drainage from CBD. S/p stent in CBD.   - General surgery consult, oncologic surgery consult  - Resume TFs and full liquids (patient has severe gastroparesis)  - GI arranged outpatient follow up. Will need repeat endoscopy in 8 weeks.     End stage COPD s/p BSLT 6/28/2022  Acute hypercapnic hypoxic respiratory failure (improving)  EBV Viremia  likely 2/2 decreased central respiratory drive vs respiratory muscle weakness and some pulmonary edema / fluid Transplanted 6/28. formal SNIFF test was performed on 7/14 showed R hemidiaphragm palsy. Of note transplanted lungs were also considered small for body size and could contribute to decreased respiratory compensation for hypercarbia. VBG improving gradually  - oxycodone 5-10 mg q4h PRN  - encourage activity and getting up in bed; PT/OT participation  - encourage chest physiotherapy QID  - weaned off Bipap at night  Immunosuppression:  - Prednisone per pulm  - Tacrolimus per pulm  - MMF per pulm  Prophylaxis:  - CMV - Valgancyclovir  - Thrush - PO nysatin  - PJP dapsone started 7/18 at 50mg qMWF     Severe Gastroparesis   Gastric emptying study 7/20 showed delayed gastric emptying with retention of 95% of stomach contents after 4 hours; 7/27: PEGJ placed  - tolerating tube feeds via J  Feeding regimen: J tube feeds continuous-- transitioned formula to non-renal formula as her kidneys have improved/needs more volume, appreciate RD assistance as well     Peptic ulcer at the pyloric junction with acute blood loss anemia in the setting of acute on chronic anemia-- had acute blood loss anemia after transplant-- had stabilized. Gradual hgb downtrend,  then after inspection bronch her G tube was hooked back up to gravity and large brown/black output seen. EGD on 8/3 showed pyloric junction ulcer and swelling causing gastric outlet objection. S/p EGD on 8/11 without active GI bleed of the ulcer at the pyloric channel  -GI signed off, OP plan in place  -BID PPI PO  - Hgb stable    Pleural Effusion, Right and Left  Left chest tube removed 8/4, thora 8/11  - daily CXR     Hypotension, resolved  H/o HTN  H/o HFpEF  Likely vasoplegia post operatively. Last echo 7/7 normal EF with good LV function. S/p hydrocortisone 7/6-7/9 and fludrocortisone 7/6-7/11 without significant improvement.  - off midorine 7/19  - Holding PTA lasix 20mg daily-- diuresis intermittently    BACILIO-- recurrent, improved 8/1-- likely due to supratherapeutic tacrolimus  Hypermagnesemia  Hyperphosphatemia  Metabolic alkalosis  Baseline renal function was normal prior to surgery. New onset renal failure after transplant, likely multifactorial due to pre-renal hypotension, less likely nephrotoxic agents. Overall kidney function improving. Today, Cr fluctuating but BUN increasing, with unclear urine output (7/22).   - HD cath removed 7/22, trend metabolites    C.diff - vanco started 7/12, course completed  -rechecked due to diarrhea (8/5)-- negative on 8/6    Tachyarrthymia  Paroxysmal afib  Paroxysmal aflutter/AT  SVT vs afib.Had an occurrence during HD on 7/14. Had afib with RVR to 200s on 7/17. EP consult placed.asmyptomatic and stable during episodes. Aflutter episode (7/17) with transfer. Vagal maneuver responsive at time. No recent tachyarrhythmia episodes (7/22).   - Per EP: patient has had episodes of possible flutter (vs AT with 2:1 conduction) and afib with RVR  (CHADS-VASc score of 2)  - On metoprolol tartrate 25mg BID-- increasing to 50 BID 8/7  - Discharge on ziopatch for 14 days with EP follow up in 1-2 months after  - holding AC at this time due to recurrent bleeding      Stress-induced/steroid induced hyperglycemia with intermittent hypoglycemia (8/3)  Has been controlled on 13-15 units of glargine BID previously  - sliding scale insulin, adjust as needed  -- will continue to monitor her needs     Incidental IPMN  Found on MRCP 8/9/22. Cystic foci in the pancreatic head measuring 4 x 3 mm superiorly and 4  x 3 mm inferiorly. Per [facility] guidelines on IPMN:  - Follow up imaging in 6 months to 1 year, then lengthen interval to 2-3 years if no change     Diet: Tube feeds via PEGJ  DVT Prophylaxis: SCDs  Dong Catheter: Not present  Central Lines: PRESENT       Cardiac Monitoring: None  Code Status: Full Code      Disposition Plan      Expected Discharge Date: 08/18/2022    Discharge Delays: Other (Add Comment)  Destination: home  Discharge Comments: Discussing GB surgical removal. Plan for restarting anticoagulation.        The patient's care was discussed with the Bedside Nurse, Patient and pulm, GI luminal and GI hepatology and nutrition Consultant.    Salvador Matthew MD  hospitalist    ______________________________________________________________________    Interval History   Abdominal pain and nausea has improved. NO fevers or chills. No cough, sob stable/improved. Feeling well today and is hopeful for discharge soon.    Data reviewed today: I reviewed all medications, new labs and imaging results over the last 24 hours. I personally reviewed the chest x-ray image(s) showing chest tubes are out; post surgical changes .    Physical Exam   Vital Signs: Temp: 98.7  F (37.1  C) Temp src: Oral BP: 120/73 Pulse: 102   Resp: 17 SpO2: 95 % O2 Device: Nasal cannula Oxygen Delivery: 1 LPM  Weight: 153 lbs 4.8 oz  Constitutional: Resting in bed.  Head: Normocephalic. Atraumatic.   EENT: No conjunctival injection or icterus. Nares patent. Moist mucous membranes.   Cardiovascular: Regular rate and rhythm. No murmurs, clicks, gallops or rubs. No edema  Respiratory: Clear to auscultation without  wheezes or crackles. Normal respiratory rate and effort.   Gastrointestinal: Abdomen diffusely tender throughout. Bowel sounds active.   Musculoskeletal: extremities normal- no gross deformities noted and normal muscle tone  Skin: no suspicious lesions, rashes, jaundice, or cyanosis   Psychiatric: mentation appears normal and affect normal/bright      Data   Recent Labs   Lab 08/15/22  1207 08/15/22  0902 08/15/22  0628 08/15/22  0416 08/14/22  1130 08/14/22  1122 08/14/22  0927 08/14/22  0653 08/13/22  1238 08/13/22  0841 08/12/22 2003 08/12/22  1657 08/12/22  0835 08/12/22  0736 08/11/22 2050 08/11/22  1810 08/09/22  0742 08/09/22  0548   WBC  --   --  10.5  --   --  10.4  --   --   --  7.9  --   --    < >  --   --   --    < > 6.3   HGB  --   --  8.8*  --   --  9.4*  --   --   --  9.2*  --   --    < >  --    < >  --    < > 6.5*   MCV  --   --  101*  --   --  102*  --   --   --  100  --   --    < >  --   --   --    < > 100   PLT  --   --  390  --   --  407  --   --   --  422  --   --    < >  --   --   --    < > 239   INR  --   --   --   --   --   --   --   --   --   --   --  0.93  --   --   --  0.95  --  0.95   NA  --   --   --   --   --   --   --  143  --  144  --   --   --  139  --   --    < > 136   POTASSIUM  --   --   --   --   --   --   --  4.2  --  5.2  --   --   --  5.2  --   --    < > 5.5*  5.5*   CHLORIDE  --   --   --   --   --   --   --  101  --  100  --   --   --  99  --   --    < > 95*   CO2  --   --   --   --   --   --   --  35*  --  36*  --   --   --  35*  --   --    < > 39*   BUN  --   --   --   --   --   --   --  50.2*  --  42.4*  --   --   --  42.9*  --   --    < > 55.6*   CR  --   --   --   --   --   --   --  1.58*  --  1.46*  --   --   --  1.42*  --   --    < > 1.41*   ANIONGAP  --   --   --   --   --   --   --  7  --  8  --   --   --  5*  --   --    < > 2*   ESTUARDO  --   --   --   --   --   --   --  8.8  --  9.1  --   --   --  8.7*  --   --    < > 8.9   * 164*  --  240*   < >  --    < >  152*   < > 139*   < >  --    < > 133*   < >  --    < > 219*   ALBUMIN  --   --   --   --   --   --   --  2.6*  --  2.8*  --   --   --  2.6*  --   --    < > 2.5*   PROTTOTAL  --   --   --   --   --   --   --  5.2*  --  5.6*  --   --    < > 5.0*  --   --    < > 4.8*   BILITOTAL  --   --   --   --   --   --   --  <0.2  --  0.2  --   --   --  0.2  --   --    < > 0.3   ALKPHOS  --   --   --   --   --   --   --  278*  --  345*  --   --   --  344*  --   --    < > 644*   ALT  --   --   --   --   --   --   --  90*  --  120*  --   --   --  147*  --   --    < > 601*   AST  --   --   --   --   --   --   --  18  --  18  --   --   --  15  --   --    < > 235*    < > = values in this interval not displayed.     Recent Results (from the past 24 hour(s))   XR Chest 2 Views    Narrative    Exam: XR CHEST 2 VIEWS, 8/15/2022 9:04 AM    Indication: H/o bilateral lung transplant, small bilateral effusions    Comparison: 8/13/2022    Findings:   Left upper extremity PICC tip projects over the axilla. Surgical  changes of bilateral lung transplantation with clamshell sternotomy  wires and mediastinal surgical clips. Stable cardiomediastinal  silhouette with prominence of the central pulmonary arteries. Stable  small to moderate bilateral loculated pleural effusions. No  pneumothorax. Stable streaky perihilar and medial bibasilar opacities.  No new focal airspace opacity.      Impression    Impression: Stable bilateral loculated pleural effusions. No new focal  airspace opacity.    CECI REGAN DO         SYSTEM ID:  [id number]   US Upper Extremity Venous Duplex Bilat    Narrative    ULTRASOUND UPPER EXTREMITY VENOUS DUPLEX BILATERAL  8/15/2022 1:11 PM    CLINICAL HISTORY: dvt eval pulm tx.     COMPARISONS: None available.    REFERRING PROVIDER: VALERIE DOWELL    TECHNIQUE: Bilateral internal jugular veins were evaluated with  grayscale, color Doppler, and spectral pulsed wave Doppler ultrasound.  Bilateral innominate, subclavian,  and axillary veins were evaluated  with color Doppler, and spectral pulsed wave Doppler ultrasound.  Bilateral brachial, basilic, and cephalic veins were evaluated with  grayscale imaging and compression.    FINDINGS: Right internal jugular vein is patent and fully compressible  with a phasic waveform.    Right innominate, subclavian, and axillary veins fill [id number] in  color Doppler images with phasic waveforms.    Right brachial, basilic, and cephalic veins are patent and fully  compressible.    Left internal jugular vein is patent and fully compressible with a  phasic waveform.    Left innominate, subclavian, and axillary veins fill [id number] in  color Doppler images with phasic waveforms.    PICC appears to be in the left medial brachial vein. Left medial  brachial vein is patent and fully compressible around the PICC. Left  lateral brachial vein is patent and fully compressible.    Left basilic and cephalic veins are patent and fully compressible.      Impression    IMPRESSION:  1. Left medial brachial vein is patent and fully compressible around  the PICC.    2. No deep or superficial venous thrombosis demonstrated in either  arm.    KATHY BERNARD MD         SYSTEM ID:  [id number]   US Upper Extremity Arterial Duplex Left    Narrative    ULTRASOUND UPPER EXTREMITY ARTERIAL DUPLEX LEFT 8/15/2022 1:03 PM    CLINICAL HISTORY: Left radial artery thrombectomy 7/2/2022.  Reevaluate.    COMPARISONS: Ultrasound upper extremity arterial duplex left 7/8/2022    REFERRING PROVIDER: VALERIE DOWELL STEPHEN    TECHNIQUE: Left arm arteries evaluated with color Doppler and spectral  pulsed wave Doppler ultrasound.    FINDINGS: LEFT:  Brachial artery, proximal: 144/0 cm/s, triphasic  Brachial artery, antecubital fossa: 122/0 cm/s, triphasic    Radial artery, origin: 118/0 cm/s, triphasic  Radial artery, wrist: 105/0 cm/s, triphasic    Ulnar artery, origin: 100/0 cm/s, triphasic  Ulnar artery, wrist: 53/0 cm/s,  triphasic      Impression    IMPRESSION: Dopplerable flow in the left radial and ulnar arteries at  the wrist.    KATHY BERNARD MD         SYSTEM ID:  [id number]   US Lower Extremity Venous Duplex Bilateral    Narrative    ULTRASOUND LOWER EXTREMITY VENOUS DUPLEX BILATERAL 8/15/2022 1:08 PM    CLINICAL HISTORY: Evaluate for deep venous thrombosis evaluation.      COMPARISONS: None available.    REFERRING PROVIDER: VALERIE DOWELL STEPHEN    TECHNIQUE: Grayscale, color Doppler, Doppler waveform ultrasound  evaluation was performed through the bilateral common femoral,  femoral, popliteal, and posterior tibial veins.     FINDINGS: Bilateral common femoral, femoral, popliteal, and posterior  tibial veins are fully compressible, patent, and demonstrate normal  phasic Doppler waveforms.      Impression    IMPRESSION: No deep venous thrombosis demonstrated in either leg.    KATHY BERNARD MD         SYSTEM ID:  [id number]   Echo Limited   Result Value    LVEF  60-65%    Narrative    [id number]  [id number]  [id number]  [id number]^MAGALYS^VALERIE^JENA     [facility]  Echocardiography [facility]  [address] [place] [address]     Name: ALMAS CASIANO  MRN: [id number]  : 1960  Study Date: 08/15/2022 01:46 PM  Age: 62 yrs  Gender: Female  Patient Location: [facility]  Reason For Study: Murmur  Ordering Physician: VALERIE DOWELL  Performed By: Cherie Castellanos RDCS     BSA: 1.7 m2  Height: 62 in  Weight: 153 lb  HR: 104  BP: 120/73 mmHg  ______________________________________________________________________________  Procedure  Limited Portable Echo Adult. The patient's rhythm is sinus tachycardia.  ______________________________________________________________________________  Interpretation Summary  Left ventricular size, wall motion and function are normal. The ejection  fraction is 60-65%.  Right ventricular function, chamber size, wall motion, and thickness are  normal.  Trace mitral  insufficiency is present.  The inferior vena cava was normal in size with preserved respiratory  variability. No pericardial effusion is present.     There has been no change.  ______________________________________________________________________________  Left Ventricle  Left ventricular size, wall motion and function are normal. The ejection  fraction is 60-65%. Left ventricular diastolic function is not assessable. No  regional wall motion abnormalities are seen.     Right Ventricle  Right ventricular function, chamber size, wall motion, and thickness are  normal.     Atria  Both atria appear normal.     Mitral Valve  The mitral valve is normal. Trace mitral insufficiency is present.     Aortic Valve  Aortic valve is normal in structure and function. The aortic valve is  tricuspid. No aortic regurgitation is present.     Tricuspid Valve  The tricuspid valve is normal. Trace tricuspid insufficiency is present. The  peak velocity of the tricuspid regurgitant jet is not obtainable.     Pulmonic Valve  The pulmonic valve is normal. Trace pulmonic insufficiency is present.     Vessels  The aorta root is normal. The inferior vena cava was normal in size with  preserved respiratory variability.     Pericardium  No pericardial effusion is present.     Compared to Previous Study  There has been no change.  ______________________________________________________________________________  Report approved by: Mary BABCOCK 08/15/2022 02:10 PM     ______________________________________________________________________________        Medications     dextrose Stopped (07/15/22 1801)       calcium carbonate 600 mg-vitamin D 400 units  1 tablet Per J Tube BID w/meals     dapsone  50 mg Per J Tube Once per day on Mon Wed Fri     fludrocortisone  0.1 mg Per J Tube Daily     insulin aspart  1-12 Units Subcutaneous Q4H     insulin glargine  7 Units Subcutaneous BID     metoprolol tartrate  50 mg Per Feeding Tube BID      multivitamin, therapeutic  1 tablet Per Feeding Tube Daily     mycophenolate  750 mg Per J Tube BID     nystatin   Topical BID     nystatin  1,000,000 Units Swish & Swallow 4x Daily     pantoprazole  40 mg Per J Tube BID     predniSONE  12.5 mg Per J Tube QAM    And     predniSONE  10 mg Per J Tube QPM     protein modular  1 packet Per Feeding Tube Daily     sodium chloride (PF)  10 mL Intracatheter Q8H     tacrolimus  3.5 mg Per J Tube BID IS     valGANciclovir  450 mg Oral or Feeding Tube Once per day on Mon Wed Fri

## 2022-08-16 ENCOUNTER — APPOINTMENT (OUTPATIENT)
Dept: OCCUPATIONAL THERAPY | Facility: CLINIC | Age: 62
DRG: 007 | End: 2022-08-16
Attending: THORACIC SURGERY (CARDIOTHORACIC VASCULAR SURGERY)
Payer: MEDICARE

## 2022-08-16 ENCOUNTER — APPOINTMENT (OUTPATIENT)
Dept: PHYSICAL THERAPY | Facility: CLINIC | Age: 62
DRG: 007 | End: 2022-08-16
Attending: THORACIC SURGERY (CARDIOTHORACIC VASCULAR SURGERY)
Payer: MEDICARE

## 2022-08-16 ENCOUNTER — TELEPHONE (OUTPATIENT)
Dept: TRANSPLANT | Facility: CLINIC | Age: 62
End: 2022-08-16

## 2022-08-16 PROBLEM — Z79.2 ADMINISTRATION OF LONG-TERM PROPHYLACTIC ANTIBIOTICS: Chronic | Status: ACTIVE | Noted: 2022-08-16

## 2022-08-16 PROBLEM — B27.00 EBV (EPSTEIN-BARR VIRUS) VIREMIA: Status: ACTIVE | Noted: 2022-08-16

## 2022-08-16 PROBLEM — D80.1 HYPOGAMMAGLOBULINEMIA (H): Status: ACTIVE | Noted: 2022-08-16

## 2022-08-16 PROBLEM — A49.8 CLOSTRIDIUM DIFFICILE INFECTION: Status: ACTIVE | Noted: 2022-07-20

## 2022-08-16 PROBLEM — A49.8 CLOSTRIDIUM DIFFICILE INFECTION: Status: RESOLVED | Noted: 2022-07-20 | Resolved: 2022-08-16

## 2022-08-16 PROBLEM — K25.3 ACUTE PYLORUS ULCER: Status: ACTIVE | Noted: 2022-08-16

## 2022-08-16 LAB
ANION GAP SERPL CALCULATED.3IONS-SCNC: 7 MMOL/L (ref 7–15)
BUN SERPL-MCNC: 71.1 MG/DL (ref 8–23)
CALCIUM SERPL-MCNC: 9.3 MG/DL (ref 8.8–10.2)
CHLORIDE SERPL-SCNC: 95 MMOL/L (ref 98–107)
CREAT SERPL-MCNC: 1.56 MG/DL (ref 0.51–0.95)
DEPRECATED HCO3 PLAS-SCNC: 37 MMOL/L (ref 22–29)
DONOR IDENTIFICATION: NORMAL
DQB2: 3584
DSA COMMENTS: NORMAL
DSA PRESENT: YES
DSA TEST METHOD: NORMAL
ERYTHROCYTE [DISTWIDTH] IN BLOOD BY AUTOMATED COUNT: 16.4 % (ref 10–15)
GFR SERPL CREATININE-BSD FRML MDRD: 38 ML/MIN/1.73M2
GLUCOSE BLDC GLUCOMTR-MCNC: 120 MG/DL (ref 70–99)
GLUCOSE BLDC GLUCOMTR-MCNC: 135 MG/DL (ref 70–99)
GLUCOSE BLDC GLUCOMTR-MCNC: 138 MG/DL (ref 70–99)
GLUCOSE BLDC GLUCOMTR-MCNC: 171 MG/DL (ref 70–99)
GLUCOSE BLDC GLUCOMTR-MCNC: 176 MG/DL (ref 70–99)
GLUCOSE BLDC GLUCOMTR-MCNC: 189 MG/DL (ref 70–99)
GLUCOSE BLDC GLUCOMTR-MCNC: 193 MG/DL (ref 70–99)
GLUCOSE BLDC GLUCOMTR-MCNC: 200 MG/DL (ref 70–99)
GLUCOSE SERPL-MCNC: 173 MG/DL (ref 70–99)
HCT VFR BLD AUTO: 29.7 % (ref 35–47)
HGB BLD-MCNC: 9.1 G/DL (ref 11.7–15.7)
MCH RBC QN AUTO: 31 PG (ref 26.5–33)
MCHC RBC AUTO-ENTMCNC: 30.6 G/DL (ref 31.5–36.5)
MCV RBC AUTO: 101 FL (ref 78–100)
ORGAN: NORMAL
PATH REPORT.COMMENTS IMP SPEC: NORMAL
PATH REPORT.FINAL DX SPEC: NORMAL
PATH REPORT.GROSS SPEC: NORMAL
PATH REPORT.MICROSCOPIC SPEC OTHER STN: NORMAL
PATH REPORT.RELEVANT HX SPEC: NORMAL
PLATELET # BLD AUTO: 404 10E3/UL (ref 150–450)
POTASSIUM SERPL-SCNC: 4.8 MMOL/L (ref 3.4–5.3)
RBC # BLD AUTO: 2.94 10E6/UL (ref 3.8–5.2)
SA 1 CELL: NORMAL
SA 1 TEST METHOD: NORMAL
SA 2 CELL: NORMAL
SA 2 TEST METHOD: NORMAL
SA1 HI RISK ABY: NORMAL
SA1 MOD RISK ABY: NORMAL
SA2 HI RISK ABY: NORMAL
SA2 MOD RISK ABY: NORMAL
SODIUM SERPL-SCNC: 139 MMOL/L (ref 136–145)
UNACCEPTABLE ANTIGENS: NORMAL
UNOS CPRA: 37
WBC # BLD AUTO: 11.7 10E3/UL (ref 4–11)
ZZZSA 1  COMMENTS: NORMAL
ZZZSA 2 COMMENTS: NORMAL

## 2022-08-16 PROCEDURE — 250N000012 HC RX MED GY IP 250 OP 636 PS 637: Performed by: INTERNAL MEDICINE

## 2022-08-16 PROCEDURE — 82310 ASSAY OF CALCIUM: CPT | Performed by: INTERNAL MEDICINE

## 2022-08-16 PROCEDURE — 88112 CYTOPATH CELL ENHANCE TECH: CPT | Mod: 26 | Performed by: PATHOLOGY

## 2022-08-16 PROCEDURE — 250N000013 HC RX MED GY IP 250 OP 250 PS 637: Performed by: SURGERY

## 2022-08-16 PROCEDURE — 97110 THERAPEUTIC EXERCISES: CPT | Mod: GP

## 2022-08-16 PROCEDURE — 85027 COMPLETE CBC AUTOMATED: CPT | Performed by: INTERNAL MEDICINE

## 2022-08-16 PROCEDURE — 250N000012 HC RX MED GY IP 250 OP 636 PS 637: Performed by: NURSE PRACTITIONER

## 2022-08-16 PROCEDURE — 99233 SBSQ HOSP IP/OBS HIGH 50: CPT | Performed by: INTERNAL MEDICINE

## 2022-08-16 PROCEDURE — 250N000013 HC RX MED GY IP 250 OP 250 PS 637: Performed by: NURSE PRACTITIONER

## 2022-08-16 PROCEDURE — 36592 COLLECT BLOOD FROM PICC: CPT | Performed by: INTERNAL MEDICINE

## 2022-08-16 PROCEDURE — 88305 TISSUE EXAM BY PATHOLOGIST: CPT | Mod: 26 | Performed by: PATHOLOGY

## 2022-08-16 PROCEDURE — 99233 SBSQ HOSP IP/OBS HIGH 50: CPT | Mod: 24 | Performed by: INTERNAL MEDICINE

## 2022-08-16 PROCEDURE — 97110 THERAPEUTIC EXERCISES: CPT | Mod: GO

## 2022-08-16 PROCEDURE — 250N000013 HC RX MED GY IP 250 OP 250 PS 637: Performed by: INTERNAL MEDICINE

## 2022-08-16 PROCEDURE — 250N000013 HC RX MED GY IP 250 OP 250 PS 637: Performed by: STUDENT IN AN ORGANIZED HEALTH CARE EDUCATION/TRAINING PROGRAM

## 2022-08-16 PROCEDURE — 97530 THERAPEUTIC ACTIVITIES: CPT | Mod: GP

## 2022-08-16 PROCEDURE — 250N000013 HC RX MED GY IP 250 OP 250 PS 637: Performed by: HOSPITALIST

## 2022-08-16 PROCEDURE — 94762 N-INVAS EAR/PLS OXIMTRY CONT: CPT

## 2022-08-16 PROCEDURE — 97535 SELF CARE MNGMENT TRAINING: CPT | Mod: GO

## 2022-08-16 PROCEDURE — 120N000002 HC R&B MED SURG/OB UMMC

## 2022-08-16 RX ORDER — GUAIFENESIN 600 MG/1
15 TABLET, EXTENDED RELEASE ORAL DAILY
Qty: 450 ML | Refills: 3 | Status: SHIPPED | OUTPATIENT
Start: 2022-08-16 | End: 2022-08-29

## 2022-08-16 RX ORDER — MYCOPHENOLATE MOFETIL 200 MG/ML
750 POWDER, FOR SUSPENSION ORAL 2 TIMES DAILY
Qty: 225 ML | Refills: 3 | Status: SHIPPED | OUTPATIENT
Start: 2022-08-16 | End: 2022-09-02

## 2022-08-16 RX ORDER — MYCOPHENOLATE MOFETIL 200 MG/ML
750 POWDER, FOR SUSPENSION ORAL 2 TIMES DAILY
Qty: 160 ML | Refills: 0 | Status: SHIPPED | OUTPATIENT
Start: 2022-08-16 | End: 2022-09-01

## 2022-08-16 RX ORDER — PREDNISONE 5 MG/1
10 TABLET ORAL 2 TIMES DAILY
Qty: 120 TABLET | Refills: 3 | Status: ON HOLD | OUTPATIENT
Start: 2022-08-17 | End: 2022-10-08

## 2022-08-16 RX ORDER — PREDNISONE 5 MG/1
10 TABLET ORAL 2 TIMES DAILY
Qty: 120 TABLET | Refills: 3 | Status: SHIPPED | OUTPATIENT
Start: 2022-08-16 | End: 2022-08-16

## 2022-08-16 RX ORDER — VALGANCICLOVIR HYDROCHLORIDE 50 MG/ML
450 POWDER, FOR SOLUTION ORAL
Qty: 118 ML | Refills: 1 | Status: SHIPPED | OUTPATIENT
Start: 2022-08-17 | End: 2022-09-02

## 2022-08-16 RX ORDER — GLUCOSAMINE HCL/CHONDROITIN SU 500-400 MG
CAPSULE ORAL
Qty: 100 EACH | Refills: 6 | Status: SHIPPED | OUTPATIENT
Start: 2022-08-16 | End: 2022-08-17

## 2022-08-16 RX ORDER — NYSTATIN 100000/ML
1000000 SUSPENSION, ORAL (FINAL DOSE FORM) ORAL 4 TIMES DAILY
Qty: 1200 ML | Refills: 3 | Status: SHIPPED | OUTPATIENT
Start: 2022-08-16 | End: 2023-01-11

## 2022-08-16 RX ORDER — BLOOD-GLUCOSE METER
EACH MISCELLANEOUS
Qty: 1 KIT | Refills: 0 | Status: SHIPPED | OUTPATIENT
Start: 2022-08-16 | End: 2022-08-17

## 2022-08-16 RX ORDER — CONTAINER,EMPTY
EACH MISCELLANEOUS
Qty: 1 EACH | Refills: 0 | Status: SHIPPED | OUTPATIENT
Start: 2022-08-16 | End: 2022-08-17

## 2022-08-16 RX ORDER — DAPSONE 25 MG/1
50 TABLET ORAL
Qty: 26 TABLET | Refills: 11 | Status: SHIPPED | OUTPATIENT
Start: 2022-08-17 | End: 2022-08-24

## 2022-08-16 RX ADMIN — INSULIN ASPART 2 UNITS: 100 INJECTION, SOLUTION INTRAVENOUS; SUBCUTANEOUS at 08:34

## 2022-08-16 RX ADMIN — MYCOPHENOLATE MOFETIL 750 MG: 200 POWDER, FOR SUSPENSION ORAL at 20:51

## 2022-08-16 RX ADMIN — FLUDROCORTISONE ACETATE 0.1 MG: 0.1 TABLET ORAL at 08:35

## 2022-08-16 RX ADMIN — Medication 40 MG: at 08:36

## 2022-08-16 RX ADMIN — TACROLIMUS 3.5 MG: 5 CAPSULE ORAL at 17:07

## 2022-08-16 RX ADMIN — THERA TABS 1 TABLET: TAB at 11:49

## 2022-08-16 RX ADMIN — METOPROLOL TARTRATE 50 MG: 50 TABLET, FILM COATED ORAL at 08:35

## 2022-08-16 RX ADMIN — Medication 1 PACKET: at 11:49

## 2022-08-16 RX ADMIN — Medication 40 MG: at 20:51

## 2022-08-16 RX ADMIN — PREDNISONE 12.5 MG: 2.5 TABLET ORAL at 08:35

## 2022-08-16 RX ADMIN — METOPROLOL TARTRATE 50 MG: 50 TABLET, FILM COATED ORAL at 20:50

## 2022-08-16 RX ADMIN — PREDNISONE 10 MG: 10 TABLET ORAL at 20:50

## 2022-08-16 RX ADMIN — OXYCODONE HYDROCHLORIDE 10 MG: 5 TABLET ORAL at 13:41

## 2022-08-16 RX ADMIN — CALCIUM CARBONATE 600 MG (1,500 MG)-VITAMIN D3 400 UNIT TABLET 1 TABLET: at 17:10

## 2022-08-16 RX ADMIN — CALCIUM CARBONATE 600 MG (1,500 MG)-VITAMIN D3 400 UNIT TABLET 1 TABLET: at 08:35

## 2022-08-16 RX ADMIN — MYCOPHENOLATE MOFETIL 750 MG: 200 POWDER, FOR SUSPENSION ORAL at 08:37

## 2022-08-16 RX ADMIN — NYSTATIN: 100000 CREAM TOPICAL at 08:37

## 2022-08-16 RX ADMIN — INSULIN ASPART 3 UNITS: 100 INJECTION, SOLUTION INTRAVENOUS; SUBCUTANEOUS at 04:23

## 2022-08-16 RX ADMIN — TACROLIMUS 3.5 MG: 5 CAPSULE ORAL at 08:36

## 2022-08-16 RX ADMIN — OXYCODONE HYDROCHLORIDE 10 MG: 5 TABLET ORAL at 01:10

## 2022-08-16 RX ADMIN — NYSTATIN: 100000 CREAM TOPICAL at 20:53

## 2022-08-16 RX ADMIN — OXYCODONE HYDROCHLORIDE 10 MG: 5 TABLET ORAL at 18:23

## 2022-08-16 RX ADMIN — INSULIN ASPART 3 UNITS: 100 INJECTION, SOLUTION INTRAVENOUS; SUBCUTANEOUS at 17:04

## 2022-08-16 ASSESSMENT — ACTIVITIES OF DAILY LIVING (ADL)
ADLS_ACUITY_SCORE: 30

## 2022-08-16 NOTE — TELEPHONE ENCOUNTER
Left message for patient to confirm scheduled Med therapy appointments on 8/22/22 .  Asked patient to call back to confirm appointments dates and times.

## 2022-08-16 NOTE — PLAN OF CARE
Patient has been assessed for Home Oxygen needs.     Pulse oximetry (SpO2) and Oxygen flow readings:     SpO2 = 86 (fluctuates between 86% and greater than this value)% on room air at rest while awake.    SpO2 improved to 99% on 1 liters/minute at rest.    SpO2 = 85% on room air during activity/with exercise.    *SpO2 improved to 97% on 1 liters/minute during activity/with exercise.

## 2022-08-16 NOTE — PROGRESS NOTES
Transplant Social Work Services Progress Note      Date of Initial Social Work Evaluation: 4/8/2021  Collaborated with: pulm team    Data: supportive visit with patient.  She has improved significantly and is preparing to discharge to the [facility], likely tomorrow.  Her son, Gera,  will be primary caregiver to start. Sofie states her children are aware of planned discharge and prepared to start caregiving. She is exited and hopeful to leave the hospital.    Intervention: support and encouragement.  Provided her with packet of information on how to write the donor family when ready.  Reviewed pharmacy coverage.  Low or no co-pays.  And reminded her that [place] medicaid is covering cost of [facility]  Assessment: patient improving.  Feels ready for discharge.     Education provided by SW: donor family writing  Plan:    Discharge Plans in Progress: home with assist to [facility]    Barriers to d/c plan: final plans in place    Follow up Plan: will continue to follow for support, counseling, education and resources.  Will check in tomorrow prior to discharge.    Mana Valdivia [facility]  Lung Transplant   Phone: [phone] Pager: [phone]

## 2022-08-16 NOTE — PROGRESS NOTES
[facility]    Medicine Progress Note - Hospitalist Service, GOLD TEAM 10    Date of Admission:  6/28/2022    Assessment & Plan    Sofie Rodriguez is a 62 year old female admitted on 6/28/2022. She has PMH of end stage COPD s/p b/l lung transplant on 6/28/2022, HTN, HFpEF, h/o hepC, osteopenia, HECTOR s/p LEEP, and former methamphetamine use, and she was transferred to [address] from MICU on 7/15/2022. She was admitted to the MICU on 7/13/20222 with prior hospital course complicated by left upper extremity acute limb ischemia s/p left radial thrombectomy on 7/1/2022. She was primarily treated for acute hypercapnic respiratory failure on intermittent BIPAP with worsening BACILIO while in the MICU. Breathing has improved, O2 requirements have decreased/stabilized, but patient has severely delayed gastric emptying found on NM study. PEGJ placed. Chest tubes out. Still needing to advance feeds. GI following, s/p abdominal MRI which revealed protinaceous /hemmorhagic GB cyst. She is s/p ERCP and EGD 8/11/22, which revealed hemobilia and some blood in 2nd and 3d portion of the duodenum w/o active source of bleeding, GI placed stent into CBD. Follow up CT abdomen did not reveal acute GI bleed.      Today's Plan:  - Preparing for discharge tomorrow  - Ziopatch for 14 days and EP follow up in 1-2 months  -  No anticoagulation for atrial fibrillation at this time given low CHADSVASC  -  Will determine outpatient insulin regimen   -  Will need tube feeds and 4 wheeled walker on discharge  -  Nystatin for 6 months per Pulm  -  GI to do repeat endoscopy/ERCP in 8 weeks      Hemobilia s/p ERCP and CBD stent  Acute Hepatitis - resolved  Dramatic rise in LFT's-- AST > ALT; ALT 700s, AST 1000s; bili rising 0.9 from 0.2. No fever. MRCP: gallbladder contains a moderate amount of proteinaceous/hemorrhagic material, which extended into the gallbladder neck. Patient had ERCP and EGD on 8/11, no  active bleeding of the pyloric channel ulcer was noted. However,  Panc/bili team did notice large amount of blood drainage from CBD. S/p stent in CBD.   - General surgery consult, oncologic surgery consult  - Resume TFs and full liquids (patient has severe gastroparesis)  - GI arranged outpatient follow up. Will need repeat endoscopy in 8 weeks.     End stage COPD s/p BSLT 6/28/2022  Acute hypercapnic hypoxic respiratory failure (improving)  EBV Viremia  likely 2/2 decreased central respiratory drive vs respiratory muscle weakness and some pulmonary edema / fluid Transplanted 6/28. formal SNIFF test was performed on 7/14 showed R hemidiaphragm palsy. Of note transplanted lungs were also considered small for body size and could contribute to decreased respiratory compensation for hypercarbia. VBG improving gradually  - oxycodone 5-10 mg q4h PRN  - encourage activity and getting up in bed; PT/OT participation  - encourage chest physiotherapy QID  - weaned off Bipap at night  Immunosuppression:  - Prednisone per pulm  - Tacrolimus per pulm  - MMF per pulm  Prophylaxis:  - CMV - Valgancyclovir  - Thrush - PO nysatin  - PJP dapsone started 7/18 at 50mg qMWF     Severe Gastroparesis   Gastric emptying study 7/20 showed delayed gastric emptying with retention of 95% of stomach contents after 4 hours; 7/27: PEGJ placed  - tolerating tube feeds via J  Feeding regimen: J tube feeds continuous-- transitioned formula to non-renal formula as her kidneys have improved/needs more volume, appreciate RD assistance as well     Peptic ulcer at the pyloric junction with acute blood loss anemia in the setting of acute on chronic anemia-- had acute blood loss anemia after transplant-- had stabilized. Gradual hgb downtrend, then after inspection bronch her G tube was hooked back up to gravity and large brown/black output seen. EGD on 8/3 showed pyloric junction ulcer and swelling causing gastric outlet objection. S/p EGD on 8/11 without  active GI bleed of the ulcer at the pyloric channel  -GI signed off, OP plan in place  -BID PPI PO  - Hgb stable    Pleural Effusion, Right and Left  Left chest tube removed 8/4, thora 8/11  - daily CXR     Hypotension, resolved  H/o HTN  H/o HFpEF  Likely vasoplegia post operatively. Last echo 7/7 normal EF with good LV function. S/p hydrocortisone 7/6-7/9 and fludrocortisone 7/6-7/11 without significant improvement.  - off midodrine 7/19  - Holding PTA lasix 20mg daily-- diuresis intermittently    BACILIO-- recurrent, improved 8/1-- likely due to supratherapeutic tacrolimus  Hypermagnesemia  Hyperphosphatemia  Metabolic alkalosis  Baseline renal function was normal prior to surgery. New onset renal failure after transplant, likely multifactorial due to pre-renal hypotension, less likely nephrotoxic agents. Overall kidney function improving. Today, Cr fluctuating but BUN increasing, with unclear urine output (7/22).   - HD cath removed 7/22, trend metabolites    C.diff - vanco started 7/12, course completed  -rechecked due to diarrhea (8/5)-- negative on 8/6    Tachyarrthymia  Paroxysmal afib  Paroxysmal aflutter/AT  SVT vs afib.Had an occurrence during HD on 7/14. Had afib with RVR to 200s on 7/17. EP consult placed.asmyptomatic and stable during episodes. Aflutter episode (7/17) with transfer. Vagal maneuver responsive at time. No recent tachyarrhythmia episodes (7/22).   - Per EP: patient has had episodes of possible flutter (vs AT with 2:1 conduction) and afib with RVR  (CHADS-VASc score of 2)  - On metoprolol tartrate 25mg BID-- increasing to 50 BID 8/7  - Discharge on ziopatch for 14 days with EP follow up in 1-2 months after  - holding AC at this time due to recurrent bleeding     Stress-induced/steroid induced hyperglycemia with intermittent hypoglycemia (8/3)  Has been controlled on 13-15 units of glargine BID previously  - sliding scale insulin, adjust as needed  -- will continue to monitor her needs   -  testing 4 times a day is needed due to insulin dependence due to hyperglycemia caused by steroids      Incidental IPMN  Found on MRCP 8/9/22. Cystic foci in the pancreatic head measuring 4 x 3 mm superiorly and 4  x 3 mm inferiorly. Per [facility] guidelines on IPMN:  - Follow up imaging in 6 months to 1 year, then lengthen interval to 2-3 years if no change     Diet: Tube feeds via PEGJ  DVT Prophylaxis: SCDs  Dong Catheter: Not present  Central Lines: PRESENT       Cardiac Monitoring: None  Code Status: Full Code      Disposition Plan      Expected Discharge Date: 08/17/2022,  1:00 PM  Discharge Delays: Other (Add Comment)  Destination: home  Discharge Comments: Discussing GB surgical removal. Plan for restarting anticoagulation.        The patient's care was discussed with the Bedside Nurse, Patient and Transplant Pulm Consultant.    Susan Delacruz MD  Internal Medicine Hospitalist  [phone]      ______________________________________________________________________    Interval History   Nursing notes reviewed, no acute events overnight.  No pain, no nausea.  PICC in right arm is not functioning well and is difficult to draw off of.    Data reviewed today: I reviewed all medications, new labs and imaging results over the last 24 hours. I personally reviewed the chest x-ray image(s) showing chest tubes are out; post surgical changes .    Physical Exam   Vital Signs: Temp: 99.3  F (37.4  C) Temp src: Oral BP: 121/73 Pulse: 80   Resp: 21 SpO2: 98 % O2 Device: Nasal cannula Oxygen Delivery: 1 LPM  Weight: 155 lbs 6.4 oz  Constitutional: Sitting in a chair in NAD  HEENT: EOMI, MMM  Cardiovascular: RRR, no m/r/g, peripheral pulses intact  Respiratory: CTAB, breathing comfortably with no use of accessory muscles  Gastrointestinal: NABS, soft, NT, ND  Musculoskeletal: No peripheral edema  Neuro:  Alert, appropriately interactive        Data   Recent Labs   Lab 08/16/22  1703 08/16/22  1614 08/16/22  1417 08/15/22  0902  08/15/22  0628 08/14/22  1130 08/14/22  1122 08/14/22  0927 08/14/22  0653 08/13/22  1238 08/13/22  0841 08/12/22 2003 08/12/22  1657 08/11/22 2050 08/11/22  1810   WBC  --   --  11.7*  --  10.5  --  10.4  --   --   --  7.9  --   --    < >  --    HGB  --   --  9.1*  --  8.8*  --  9.4*  --   --   --  9.2*  --   --    < >  --    MCV  --   --  101*  --  101*  --  102*  --   --   --  100  --   --    < >  --    PLT  --   --  404  --  390  --  407  --   --   --  422  --   --    < >  --    INR  --   --   --   --   --   --   --   --   --   --   --   --  0.93  --  0.95   NA  --   --  139  --   --   --   --   --  143  --  144  --   --    < >  --    POTASSIUM  --   --  4.8  --   --   --   --   --  4.2  --  5.2  --   --    < >  --    CHLORIDE  --   --  95*  --   --   --   --   --  101  --  100  --   --    < >  --    CO2  --   --  37*  --   --   --   --   --  35*  --  36*  --   --    < >  --    BUN  --   --  71.1*  --   --   --   --   --  50.2*  --  42.4*  --   --    < >  --    CR  --   --  1.56*  --   --   --   --   --  1.58*  --  1.46*  --   --    < >  --    ANIONGAP  --   --  7  --   --   --   --   --  7  --  8  --   --    < >  --    ESTUARDO  --   --  9.3  --   --   --   --   --  8.8  --  9.1  --   --    < >  --    * 176* 173*   < >  --    < >  --    < > 152*   < > 139*   < >  --    < >  --    ALBUMIN  --   --   --   --   --   --   --   --  2.6*  --  2.8*  --   --    < >  --    PROTTOTAL  --   --   --   --   --   --   --   --  5.2*  --  5.6*  --   --    < >  --    BILITOTAL  --   --   --   --   --   --   --   --  <0.2  --  0.2  --   --    < >  --    ALKPHOS  --   --   --   --   --   --   --   --  278*  --  345*  --   --    < >  --    ALT  --   --   --   --   --   --   --   --  90*  --  120*  --   --    < >  --    AST  --   --   --   --   --   --   --   --  18  --  18  --   --    < >  --     < > = values in this interval not displayed.     No results found for this or any previous visit (from the past 24  hour(s)).  Medications     dextrose Stopped (07/15/22 1801)       calcium carbonate 600 mg-vitamin D 400 units  1 tablet Per J Tube BID w/meals     dapsone  50 mg Per J Tube Once per day on Mon Wed Fri     fludrocortisone  0.1 mg Per J Tube Daily     insulin aspart  1-12 Units Subcutaneous Q4H     insulin glargine  7 Units Subcutaneous BID     metoprolol tartrate  50 mg Per Feeding Tube BID     multivitamin, therapeutic  1 tablet Per Feeding Tube Daily     mycophenolate  750 mg Per J Tube BID     nystatin   Topical BID     nystatin  1,000,000 Units Swish & Swallow 4x Daily     pantoprazole  40 mg Per J Tube BID     predniSONE  12.5 mg Per J Tube QAM    And     predniSONE  10 mg Per J Tube QPM     protein modular  1 packet Per Feeding Tube Daily     sodium chloride (PF)  10 mL Intracatheter Q8H     tacrolimus  3.5 mg Per J Tube BID IS     valGANciclovir  450 mg Oral or Feeding Tube Once per day on Mon Wed Fri

## 2022-08-16 NOTE — PROGRESS NOTES
Pulmonary Medicine  Cystic Fibrosis - Lung Transplant Team  Progress Note  2022       Patient: Sofie Rodriguez  MRN: [id number]  : 1960 (age 62 year old)  Transplant: 2022 (Lung), POD#49  Admission date: 2022    Assessment & Plan:     Sofie Rodriguez is a 62 year old female with a PMH significant for end-stage COPD, HTN, HFpEF, Mycobacterium peregrinum colonization, h/o hepatitis C, HECTOR s/p LEEP procedure, and former methamphetamine use.  S/p BSLT on 22 (lungs slightly undersized), extubated POD #2 but with persistent hypercapnia, mostly compensated and only slightly improved with intermittent NIPPV, discontinued 8/3.  Post-operative course complicated by bilateral pleural effusions, left radial artery thrombus (presumed secondary to arterial line) s/p thrombectomy , BACILIO, C diff, gastroparesis s/p GJ tube placement in IR , coffee ground G tube output s/p EGD 8/3 with pyloric ulcer noted, melena with hgb drop (), elevated LFTs () with extrahepatic mass on US and MRCP with increase in biliary dilation.  S/p ERCP  with findings concerning for hemobilia, but no bleeding from ulcer in pyloric channel.  Being managed by both General Surgery and Surg Onc, no plan for cholecystectomy at this time.        Today's recommendations:  - CXR 8/15 with stable b/l loculated pleural effusions (personally reviewed)  - Nebs discontinued 8/15  - Wean supplemental oxygen and discourage use if >92% SpO2, exercise and overnight oximetry needed prior to discharge (ordered for )  - Peripheral US (BUE and BLE) to screen for thrombus prior to discharge - neg  - DSA pending (last newly positive on 8/10)  - Following pending pleural cultures, NGTD  - IgG due , EBV due   - Recommend minimal PO intake d/t need for increased pain meds with increased PO  - Repeat LUE arterial US 8/15 with dopplerable flow   - GI recommending f/up ERCP in ~ 2 months with Dr. Arroyo      S/p bilateral  sequential lung transplant (BSLT) for end stage COPD:  Persistent hypercapneic respiratory failure:   Persistent hypotension, Resolved:   Bilateral hydroPTX:  Right hemidiaphragm palsy: Explant pathology with severe emphysema with subpleural bullae formation, changes of chronic bronchitis, subpleural scars and patchy pulmonary edema; benign hilar lymph nodes.  Extubated 6/30 but with persistent hypercapnia without dyspnea, appears to be a respiratory drive problem.  Sniff (7/14) notable for right hemidiaphragm palsy.  S/p left apical chest tube 7/25-8/4, right surgical tube removed 8/6.  Bronch (8/2) with normal inspection of anastomoses.  NIPPV initially overnight for persistent hypercapnia, stopped 8/3 given lack of improvement with therapy, compensated hypercapnia persists.  CMV (8/8) negative.  CTA abdomen (8/11) noted similar appearance of bilateral loculated moderate pleural effusions with adjacent compressive atelectasis and LL predominant interlobular septal thickening.  S/p right diagnostic thoracentesis (8/12) with 100 ml removed.  CXR (8/13) demonstrates small right effusion, improved loculated left effusion.  Continues to use 1L NC for comfort without evidence of hypoxia.   - CXR 8/15 with stable bilateral pleural effusions (personally reviewed)  - Nebs: levalbuterol and Mucomyst BID discontinued 8/15  - Aerobika and incentive spirometry hourly while awake  - Wean supplemental oxygen and discourage use if >92% SpO2, exercise and overnight oximetry needed prior to discharge (ordered for tomorrow)  - Diuresis per primary  - Peripheral US (BUE and BLE) to screen for thrombus prior to discharge - negative     Immunosuppression:  Induction therapy with basiliximab (and high dose IV steroid).  - Tacrolimus 4 mg BID (decreased 8/12, missed evening dose 8/11, via J tube).  Goal level 8-12.  Level today 14.2, dose decreased to 3.5 mg BID (ensure medication is consistently via J tube).  Repeat level 8/18  (ordered).  -  mg BID (increased 8/7, prior decrease for GI symptoms/leukopenia)   - Prednisone 12.5 mg qAM / 10 mg qPM, next taper due 8/18 (not yet ordered)  Date AM dose (mg) PM dose (mg)   8/4/22 12.5 10   8/18/22 10 10   9/15/22 10 7.5   10/13/22 7.5 7.5   11/10/22 7.5 5   12/8/22 5 5   1/5/23 5 2.5      Prophylaxis:   - Dapsone qMWF for PJP ppx  - VGCV for CMV ppx, CMV monthly (negative 8/8)  - Nystatin for oral candidiasis ppx, 6 month course  - See below for serologies and viral ppx:    Donor Recipient Intervention   CMV status Positive Positive Valganciclovir POD #8-90   EBV status Positive Positive EBV monitoring as below   HSV status N/A Positive Not indicated      Positive DSA: Newly positive DSA on 8/10, DQB2 with mfi 2155.   - Repeat DSA pending (8/15)     ID: Donor bronch cultures (OSH) with Strep beta hemolytic (not group A).  - Pleural fluid cultures (8/12) NGTD     H/o M. peregrinum colonization: NGTD post-transplant.  - AFB to be sent on all future bronchs     Streptococcus pneumoniae:  Stenotrophomonas maltophilia: Noted in recipient cultures at time of transplant.  S/p ceftazidime 6/28-7/10, vancomycin 7/7-7/8 and 6/28-6/30, and levofloxacin 7/10-7/12 for total 2 week ABX course.     Hypogammaglobulinemia: IgG adequate at time of transplant, repeat level low (336) on 7/28.  S/p IVIG dosing with premedication 7/30, tolerated well.  IgG on 8/10 low but stable at 590, replacement not indicated.  - Repeat IgG due 8/30     H/o hepatitis C:  Diagnosed in 1980s s/p 2m treatment, quant negative since 10/2017, last positive 2/20/17 (885,[phone]).  Ab positive on 6/2021 with negative HCV PCR.  H/o remote EtOH abuse.  MR elastography (4/27/21) with hepatology review and consult without any concerns post transplant.  Hepatitis C RNA negative and hepatitis C antibody positive (old) on 6/28.  Repeat hepatitis C RNA negative 8/8 in setting of elevated LFTs.     EBV viremia: EBV level 11k, log 4.1 on 7/28.   Not likely to be clinically significant.  Repeat EBV (in the setting of elevated LFTs) decreased to 1,501, log 3.2 (8/8).  - EBV monthly monitoring due 9/8     Other relevant problems managed by primary team:      SVT:   Aflutter with RVR: SVT first noted on 7/14, prior to HD line placement, continues intermittently.  Aflutter with RVR to 200 7/17 triggered by activity.  EP consulted 7/17 given persistent tachycardia/dysrhythmia, midodrine discontinued.  AC deferred per EP given bleeding risk.  On metoprolol.     Left hand ischemia: Radial artery thrombosis identified on duplex doppler s/p thrombectomy on 7/2.  Primary team to clarify plan for AC with Vascular.  - Repeat LUE arterial US today     BACILIO:   Hyperkalemia: Likely multifactorial including medications (Bactrim, tacrolimus) and hypotension.  S/p non-tunneled HD line 7/14 with iHD 7/14-7/16, line removed 7/22.  Creatinine increased since 7/29 with Diamox and elevated tacrolimus level.  Potassium had been stable, elevated intermittently since 8/8.  Transitioned to low potassium TF formula 8/8 although intermittently held d/t GI symptoms  - Tacrolimus monitoring as above  - Florinef (started 8/9, increased 8/12)     Elevated LFTs:   Extrahepatic and intrahepatic biliary dilation: Acute rise in LFTs (alk phos 861, , AST 1,143 and bilirubin 0.9) on 8/8 (prior normal) associated with increased and different abdominal pain as below.  Amylase low, lipase normal.  Abdominal US (8/8) with extrahepatic mass at level of common bile duct with associated intrahepatic and common bile duct dilation, patent hepatic vasculature, and layering gallbladder sludge.  Concern for infection vs malignancy vs fluid collection.  MRCP (8/9) with slight increase in moderate biliary dilation and gall bladder with moderate protein/hemorrhagic material.  Also noted to have cystic foci in the pancreatic head (most consistent with IPMN).  S/p ERCP (8/11) with findings concerning for  hemobilia, stent placed across duodenum and in CBD.  CTA abdomen (8/11) without evidence of acute GI bleed.  GI team concerned bleeding originating from gallbladder (see note 8/12 for details).  Hepatic panel stable/improving.    - Repeat abdominal CT in 6 months for pancreatic findings (~2/11)  - Management per primary team with consult by Panc/Bili, General Surgery, and Surg Onc  - Repeat ERCP with Dr. Arroyo in 2 months, ~ 10/16/22     Severe gastroparesis:   Pyloric ulcer: Cramping abdominal pain 7/15.  AXR with large stool burden in colon, no obstruction or distention.  CT abdomen (7/15) with moderate to large gastric distention, otherwise without obstruction.  NG placed for LIS with moderate to large output for several days, did not tolerate clamping.  Gastric emptying study (7/20) with severe gastric emptying delay (95% retention at 4 hours), s/p GJ tube placement in IR 7/27.  S/p EGD 8/3 for coffee ground G tube output, noted to have pyloric ulcer and excessive gastric fluid and residual food.  Increased abdominal pain/cramping with trial of cycled TF 8/7 to 8/8.  AXR 8/8 without evidence of peritoneal free air or abnormally dilated loops of bowel.  Hemoglobin drop with dark tarry stools 8/8.  S/p repeat EGD (8/11) with mild erythema 2/2 GJ tube trauma seen near insertion site but no active bleeding.  - PPI BID  - Simethicone prn  - TF and ALL medications via J tube  - G tube to gravity drainage; full liquid diet for comfort only (recommend minimal intake d/t need for increased pain meds with increased PO)  - Repeat EGD in 8 weeks (~10/6) to check healing of ulcer     C diff infection: Abdominal pain with diarrhea noted 7/8, AXR without dilated bowel, moderate colonic stool burden.  C diff positive 7/11.  Loose stools (on tube feeds) stable.  S/p PO vancomycin (7/11-7/28).  Repeat C diff negative 8/6.  Low threshold to resume vancomycin in the future with ABX course     Hypomagnesemia: Suppressed absorption  "d/t CNI.  Continue daily magnesium with replacement protocol prn.     We appreciate the excellent care provided by the [place] [id number] team.  Recommendations communicated via in person rounding and this note.  Will continue to follow along closely, please do not hesitate to call with any questions or concerns.    Susan Miller MD  Pulmonary, Allergy, Critical Care, and Sleep Medicine   [facility]   Pager: [phone]    Subjective & Interval History:     Feels breathing is comfortable. Minimal cough with clear mucous. Using aerobika/IS. Walking feels comfortable but difficulty with stairs. Soft BMs. Tolerating apple juice and TFs.     Review of Systems:     C: No fever, no chills  INTEGUMENTARY/SKIN: No rash or obvious new lesions  ENT/MOUTH: No nasal congestion or drainage  RESP: See interval history  CV: No chest pain, no palpitations, no peripheral edema, no orthopnea  GI: + intermittent nausea, no vomiting, + stable loose stools, no reflux symptoms  : No dysuria  MUSCULOSKELETAL: No myalgias, no arthralgias  ENDOCRINE: Blood sugars intermittently elevated  NEURO: No headache, no numbness or tingling  PSYCHIATRIC: Mood stable    Physical Exam:     All notes, images, and labs from past 24 hours (at minimum) were reviewed.    Vital signs:  Temp: 99.3  F (37.4  C) Temp src: Oral BP: 121/73 Pulse: 80   Resp: 21 SpO2: 98 % O2 Device: Nasal cannula Oxygen Delivery: 1 LPM Height: 157.5 cm (5' 2\") Weight: 70.5 kg (155 lb 6.4 oz)  I/O:       Intake/Output Summary (Last 24 hours) at 8/16/2022 1658  Last data filed at 8/16/2022 1400  Gross per 24 hour   Intake 1448 ml   Output 825 ml   Net 623 ml       Constitutional: Sitting up in a chair, in no apparent distress.   HEENT: Eyes with pink conjunctivae, anicteric.  Oral mucosa moist without lesions.   PULM: Non-labored breathing on 1L NC.   CV: Tachycardia.  No peripheral edema.   ABD: NABS, soft, mild tenderness of upper quadrants of abdomen, nondistended.  PEG/J " tube site cdi.  MSK: Moves all extremities.  No apparent muscle wasting.   NEURO: Alert and oriented, conversant.   SKIN: Warm, dry.  No rash on limited exam.   PSYCH: Mood stable.     Lines, Drains, and Devices:  Midline Catheter Double Lumen (Active)   Site Assessment WDL 08/15/22 0400   External Cath Length (cm) 1 cm 08/14/22 0903   Initial Extremity Circumference (cm) 29 cm 07/28/22 1455   Dressing Intervention Chlorhexidine patch;Transparent 08/14/22 2000   Line Necessity Yes, meets criteria 08/15/22 0400   Dressing Change Due 08/21/22 08/14/22 2000   Purple - Status saline locked 08/15/22 0400   Purple - Cap Change Due 08/16/22 08/14/22 2000   Red - Status saline locked 08/15/22 0400   Red - Cap Change Due 08/16/22 08/14/22 2000   Extravasation? No 08/14/22 2000   Number of days: 18     Data:     LABS    CMP:   Recent Labs   Lab 08/16/22  1614 08/16/22  1417 08/16/22  1208 08/16/22  0820 08/14/22  0927 08/14/22  0653 08/13/22  1238 08/13/22  0841 08/12/22  1158 08/12/22  0955 08/12/22  0835 08/12/22  0736 08/11/22  0747 08/11/22  0610 08/10/22  0650 08/10/22  0453   NA  --  139  --   --   --  143  --  144  --   --   --  139  --  138  --  138   POTASSIUM  --  4.8  --   --   --  4.2  --  5.2  --   --   --  5.2   < > 5.4*   < > 5.7*   CHLORIDE  --  95*  --   --   --  101  --  100  --   --   --  99  --  97*  --  97*   CO2  --  37*  --   --   --  35*  --  36*  --   --   --  35*  --  37*  --  38*   ANIONGAP  --  7  --   --   --  7  --  8  --   --   --  5*  --  4*  --  3*   * 173* 135* 189*   < > 152*   < > 139*   < >  --    < > 133*   < > 124*   < > 111*   BUN  --  71.1*  --   --   --  50.2*  --  42.4*  --   --   --  42.9*  --  49.5*  --  46.0*   CR  --  1.56*  --   --   --  1.58*  --  1.46*  --   --   --  1.42*  --  1.50*  --  1.36*   GFRESTIMATED  --  38*  --   --   --  37*  --  41*  --   --   --  42*  --  39*  --  44*   ESTUARDO  --  9.3  --   --   --  8.8  --  9.1  --   --   --  8.7*  --  8.9  --  9.0   MAG   --   --   --   --   --   --   --   --   --   --   --  2.1  --   --   --  2.7*   PHOS  --   --   --   --   --   --   --   --   --   --   --  3.9  --   --   --  4.1   PROTTOTAL  --   --   --   --   --  5.2*  --  5.6*  --  5.6*  --  5.0*  --  4.9*   < >  --    ALBUMIN  --   --   --   --   --  2.6*  --  2.8*  --   --   --  2.6*  --  2.4*  --   --    BILITOTAL  --   --   --   --   --  <0.2  --  0.2  --   --   --  0.2  --  0.2  --   --    ALKPHOS  --   --   --   --   --  278*  --  345*  --   --   --  344*  --  412*  --   --    AST  --   --   --   --   --  18  --  18  --   --   --  15  --  19  --   --    ALT  --   --   --   --   --  90*  --  120*  --   --   --  147*  --  215*  --   --     < > = values in this interval not displayed.     CBC:   Recent Labs   Lab 08/16/22  1417 08/15/22  0628 08/14/22  1122 08/13/22  0841   WBC 11.7* 10.5 10.4 7.9   RBC 2.94* 2.86* 3.12* 3.05*   HGB 9.1* 8.8* 9.4* 9.2*   HCT 29.7* 28.9* 31.8* 30.4*   * 101* 102* 100   MCH 31.0 30.8 30.1 30.2   MCHC 30.6* 30.4* 29.6* 30.3*   RDW 16.4* 16.5* 16.7* 16.3*    390 407 422       INR:   Recent Labs   Lab 08/12/22  1657 08/11/22  1810   INR 0.93 0.95       Glucose:   Recent Labs   Lab 08/16/22  1614 08/16/22  1417 08/16/22  1208 08/16/22  0820 08/16/22  0422 08/15/22  2055   * 173* 135* 189* 193* 196*       Blood Gas: No lab results found in last 7 days.    Culture Data No results for input(s): CULT in the last 168 hours.    Virology Data:   Lab Results   Component Value Date    FLUAH1 Not Detected 06/29/2022    FLUAH3 Not Detected 06/29/2022    ZT2094 Not Detected 06/29/2022    IFLUB Not Detected 06/29/2022    RSVA Not Detected 06/29/2022    RSVB Not Detected 06/29/2022    PIV1 Not Detected 06/29/2022    PIV2 Not Detected 06/29/2022    PIV3 Not Detected 06/29/2022    HMPV Not Detected 06/29/2022       Historical CMV results (last 3 of prior testing):  Lab Results   Component Value Date    CMVQNT Not Detected 08/08/2022    CMVQNT  Not Detected 07/25/2022     No results found for: CMVLOG    Urine Studies    Recent Labs   Lab Test 08/01/22  0319 07/08/22  0831 07/05/22  1004   URINEPH 8.0* 5.5 5.5   NITRITE Negative Negative Negative   LEUKEST Negative Negative Negative   WBCU  --  1 5       Most Recent [place] Pulmonary Function Testing (FVC/FEV1 only)  FVC-Pre   Date Value Ref Range Status   04/29/2022 1.82 L    11/11/2021 2.17 L    06/14/2021 2.00 L      FVC-%Pred-Pre   Date Value Ref Range Status   04/29/2022 58 %    11/11/2021 70 %    06/14/2021 64 %      FEV1-Pre   Date Value Ref Range Status   04/29/2022 0.51 L    11/11/2021 0.53 L    06/14/2021 0.54 L      FEV1-%Pred-Pre   Date Value Ref Range Status   04/29/2022 20 %    11/11/2021 21 %    06/14/2021 21 %        IMAGING    Recent Results (from the past 48 hour(s))   XR Chest 2 Views    Narrative    PA and lateral chest    HISTORY: Follow-up lung transplant    COMPARISON STUDY: 8/12/2022    FINDINGS: Cardiac silhouette is nonenlarged. Bilateral loculated  pleural effusions left greater than right unchanged. Left axillary  PICC      Impression    IMPRESSION: No significant change in loculated bilateral pleural  effusions left greater than right.    JOHANN MORAES MD         SYSTEM ID:  [id number]

## 2022-08-16 NOTE — PROGRESS NOTES
Care Management Follow Up    Length of Stay (days): 48    Expected Discharge Date: 08/17/2022     Concerns to be Addressed:       Patient plan of care discussed at interdisciplinary rounds: Yes    Anticipated Discharge Disposition: Other (Comments)     Anticipated Discharge Services:    Anticipated Discharge DME:      Patient/family educated on Medicare website which has current facility and service quality ratings:    Education Provided on the Discharge Plan:    Patient/Family in Agreement with the Plan:      Referrals Placed by CM/SW:    Private pay costs discussed: Not applicable    Additional Information:  Plan discharge tomorrow, provider paged for early orders as [facility] is requesting that after they were updated. Txp coordinators setting up appointments for after care. Patient discharging to [facility]. PLC already completed Friday before last with family for TF. RNCC to follow up with [facility] tomorrow for planning.     Family planning on transporting patient on family/patient shuttle to [place], 1 p.m.    [facility]  Will need tank for transport, says they will have one here by 12pm tomorrow, family will bring patient's home concentrator to apartment.  [phone]    [facility]  Phone # [phone]  Fax # [phone]   Update tomorrow.    DME    [facility]  [facility] [address]   [address]  Phone: [phone]  Fax: [phone]    Vendor to supply--Four wheeled walker, family to  once orders in. RNCC to call on orders tomorrow.      _______________________________________________    [facility] [phone], Fax [phone]  Update tomorrow on discharge.      MISAEL VanN, BA, RN, CMSRN, RNCC  Covering Units 6D/OBS  Pager: [phone]  Phone: [phone]  6th floor Weekend/Holiday Pager: [phone]  Observation weekend/after hours: [phone]

## 2022-08-17 ENCOUNTER — HOME INFUSION (PRE-WILLOW HOME INFUSION) (OUTPATIENT)
Dept: PHARMACY | Facility: CLINIC | Age: 62
End: 2022-08-17

## 2022-08-17 ENCOUNTER — APPOINTMENT (OUTPATIENT)
Dept: OCCUPATIONAL THERAPY | Facility: CLINIC | Age: 62
DRG: 007 | End: 2022-08-17
Attending: THORACIC SURGERY (CARDIOTHORACIC VASCULAR SURGERY)
Payer: MEDICARE

## 2022-08-17 ENCOUNTER — APPOINTMENT (OUTPATIENT)
Dept: CARDIOLOGY | Facility: CLINIC | Age: 62
DRG: 007 | End: 2022-08-17
Attending: INTERNAL MEDICINE
Payer: MEDICARE

## 2022-08-17 VITALS
DIASTOLIC BLOOD PRESSURE: 72 MMHG | BODY MASS INDEX: 28.6 KG/M2 | HEIGHT: 62 IN | RESPIRATION RATE: 12 BRPM | SYSTOLIC BLOOD PRESSURE: 131 MMHG | WEIGHT: 155.4 LBS | TEMPERATURE: 98.1 F | HEART RATE: 96 BPM | OXYGEN SATURATION: 99 %

## 2022-08-17 DIAGNOSIS — Z00.6 RESEARCH STUDY PATIENT: Primary | ICD-10-CM

## 2022-08-17 PROBLEM — E09.65 DRUG OR CHEMICAL INDUCED DIABETES MELLITUS WITH HYPERGLYCEMIA (H): Status: ACTIVE | Noted: 2022-08-17

## 2022-08-17 LAB
ANION GAP SERPL CALCULATED.3IONS-SCNC: 6 MMOL/L (ref 7–15)
BACTERIA PLR CULT: NO GROWTH
BACTERIA PLR CULT: NO GROWTH
BUN SERPL-MCNC: 68.1 MG/DL (ref 8–23)
CALCIUM SERPL-MCNC: 9 MG/DL (ref 8.8–10.2)
CHLORIDE SERPL-SCNC: 95 MMOL/L (ref 98–107)
CREAT SERPL-MCNC: 1.48 MG/DL (ref 0.51–0.95)
DEPRECATED HCO3 PLAS-SCNC: 39 MMOL/L (ref 22–29)
ERCP: NORMAL
GFR SERPL CREATININE-BSD FRML MDRD: 40 ML/MIN/1.73M2
GLUCOSE BLDC GLUCOMTR-MCNC: 145 MG/DL (ref 70–99)
GLUCOSE BLDC GLUCOMTR-MCNC: 162 MG/DL (ref 70–99)
GLUCOSE BLDC GLUCOMTR-MCNC: 165 MG/DL (ref 70–99)
GLUCOSE BLDC GLUCOMTR-MCNC: 167 MG/DL (ref 70–99)
GLUCOSE SERPL-MCNC: 176 MG/DL (ref 70–99)
HOLD SPECIMEN: NORMAL
MAGNESIUM SERPL-MCNC: 2.2 MG/DL (ref 1.7–2.3)
POTASSIUM SERPL-SCNC: 5 MMOL/L (ref 3.4–5.3)
SODIUM SERPL-SCNC: 140 MMOL/L (ref 136–145)

## 2022-08-17 PROCEDURE — 250N000012 HC RX MED GY IP 250 OP 636 PS 637: Performed by: NURSE PRACTITIONER

## 2022-08-17 PROCEDURE — 99233 SBSQ HOSP IP/OBS HIGH 50: CPT | Mod: 24 | Performed by: INTERNAL MEDICINE

## 2022-08-17 PROCEDURE — 250N000013 HC RX MED GY IP 250 OP 250 PS 637: Performed by: NURSE PRACTITIONER

## 2022-08-17 PROCEDURE — 250N000012 HC RX MED GY IP 250 OP 636 PS 637: Performed by: INTERNAL MEDICINE

## 2022-08-17 PROCEDURE — 93246 EXT ECG>7D<15D RECORDING: CPT

## 2022-08-17 PROCEDURE — 250N000013 HC RX MED GY IP 250 OP 250 PS 637: Performed by: STUDENT IN AN ORGANIZED HEALTH CARE EDUCATION/TRAINING PROGRAM

## 2022-08-17 PROCEDURE — 250N000013 HC RX MED GY IP 250 OP 250 PS 637: Performed by: SURGERY

## 2022-08-17 PROCEDURE — 80048 BASIC METABOLIC PNL TOTAL CA: CPT | Performed by: INTERNAL MEDICINE

## 2022-08-17 PROCEDURE — 99239 HOSP IP/OBS DSCHRG MGMT >30: CPT | Performed by: INTERNAL MEDICINE

## 2022-08-17 PROCEDURE — 93244 EXT ECG>48HR<7D REV&INTERPJ: CPT | Performed by: INTERNAL MEDICINE

## 2022-08-17 PROCEDURE — 999N000007 HC SITE CHECK

## 2022-08-17 PROCEDURE — 250N000013 HC RX MED GY IP 250 OP 250 PS 637: Performed by: HOSPITALIST

## 2022-08-17 PROCEDURE — 36592 COLLECT BLOOD FROM PICC: CPT | Performed by: INTERNAL MEDICINE

## 2022-08-17 PROCEDURE — 97535 SELF CARE MNGMENT TRAINING: CPT | Mod: GO | Performed by: OCCUPATIONAL THERAPIST

## 2022-08-17 PROCEDURE — 83735 ASSAY OF MAGNESIUM: CPT | Performed by: INTERNAL MEDICINE

## 2022-08-17 PROCEDURE — 250N000013 HC RX MED GY IP 250 OP 250 PS 637: Performed by: INTERNAL MEDICINE

## 2022-08-17 PROCEDURE — 94762 N-INVAS EAR/PLS OXIMTRY CONT: CPT

## 2022-08-17 RX ORDER — BLOOD-GLUCOSE METER
EACH MISCELLANEOUS
Qty: 1 KIT | Refills: 0 | Status: SHIPPED | OUTPATIENT
Start: 2022-08-17 | End: 2023-11-29

## 2022-08-17 RX ORDER — GLUCOSAMINE HCL/CHONDROITIN SU 500-400 MG
CAPSULE ORAL
Qty: 100 EACH | Refills: 6 | Status: SHIPPED | OUTPATIENT
Start: 2022-08-17 | End: 2023-11-29

## 2022-08-17 RX ORDER — CONTAINER,EMPTY
EACH MISCELLANEOUS
Qty: 1 EACH | Refills: 0 | Status: SHIPPED | OUTPATIENT
Start: 2022-08-17 | End: 2022-10-11

## 2022-08-17 RX ADMIN — OXYCODONE HYDROCHLORIDE 10 MG: 5 TABLET ORAL at 00:18

## 2022-08-17 RX ADMIN — DAPSONE 50 MG: 25 TABLET ORAL at 14:08

## 2022-08-17 RX ADMIN — VALGANCICLOVIR HYDROCHLORIDE 450 MG: 50 POWDER, FOR SOLUTION ORAL at 09:47

## 2022-08-17 RX ADMIN — INSULIN ASPART 2 UNITS: 100 INJECTION, SOLUTION INTRAVENOUS; SUBCUTANEOUS at 04:53

## 2022-08-17 RX ADMIN — Medication 40 MG: at 09:28

## 2022-08-17 RX ADMIN — INSULIN ASPART 2 UNITS: 100 INJECTION, SOLUTION INTRAVENOUS; SUBCUTANEOUS at 00:29

## 2022-08-17 RX ADMIN — OXYCODONE HYDROCHLORIDE 10 MG: 5 TABLET ORAL at 09:37

## 2022-08-17 RX ADMIN — NYSTATIN 1000000 UNITS: 100000 SUSPENSION ORAL at 09:50

## 2022-08-17 RX ADMIN — PREDNISONE 12.5 MG: 2.5 TABLET ORAL at 09:36

## 2022-08-17 RX ADMIN — INSULIN ASPART 1 UNITS: 100 INJECTION, SOLUTION INTRAVENOUS; SUBCUTANEOUS at 09:47

## 2022-08-17 RX ADMIN — METOPROLOL TARTRATE 50 MG: 50 TABLET, FILM COATED ORAL at 09:42

## 2022-08-17 RX ADMIN — TACROLIMUS 3.5 MG: 5 CAPSULE ORAL at 09:32

## 2022-08-17 RX ADMIN — NYSTATIN: 100000 CREAM TOPICAL at 09:51

## 2022-08-17 RX ADMIN — OXYCODONE HYDROCHLORIDE 10 MG: 5 TABLET ORAL at 04:50

## 2022-08-17 RX ADMIN — CALCIUM CARBONATE 600 MG (1,500 MG)-VITAMIN D3 400 UNIT TABLET 1 TABLET: at 09:42

## 2022-08-17 RX ADMIN — THERA TABS 1 TABLET: TAB at 14:07

## 2022-08-17 RX ADMIN — INSULIN ASPART 1 UNITS: 100 INJECTION, SOLUTION INTRAVENOUS; SUBCUTANEOUS at 14:47

## 2022-08-17 RX ADMIN — MYCOPHENOLATE MOFETIL 750 MG: 200 POWDER, FOR SUSPENSION ORAL at 09:34

## 2022-08-17 RX ADMIN — NYSTATIN 1000000 UNITS: 100000 SUSPENSION ORAL at 14:08

## 2022-08-17 RX ADMIN — FLUDROCORTISONE ACETATE 0.1 MG: 0.1 TABLET ORAL at 09:36

## 2022-08-17 ASSESSMENT — ACTIVITIES OF DAILY LIVING (ADL)
ADLS_ACUITY_SCORE: 30
ADLS_ACUITY_SCORE: 27
ADLS_ACUITY_SCORE: 30
ADLS_ACUITY_SCORE: 30
ADLS_ACUITY_SCORE: 27
ADLS_ACUITY_SCORE: 27

## 2022-08-17 NOTE — PROGRESS NOTES
Transplant Social Work Service Discharge Note and  Care Management Discharge Note      Patient Name:  Sofie Rodriguez     Anticipated Discharge Date:  08/17/22    Discharge Disposition:   Other:  [facility] [place] with family assist    Plan for 24 hour care for immediate post transplant period: 3 adult children will be switching off.  Son Gera coming today and will be first caregiver.     If not local, plans for short term stay:  [facility] [place]    Additional Services/Equipment Arranged:  As arranged by care coordinator     Persons notified of above discharge plan:  Patient, family    Patient / Family response to discharge plan:  agreeable    Education and resources provided by  at discharge: role of transplant  in out patient setting and provided contact info for , availability of support groups.      Discussed anticipated pharmacy out of pocket costs: YES,  Patient has no or low co-pays    Provided [employer] Donor Letter Writing packet : YES    Discharge Transportation: family or friend will provide    Private pay costs discussed: yes.  should not have out of pocket costs    PAS Confirmation Code:  n/a  Patient/family educated on Medicare website which has current facility and service quality ratings:  n/a    Education Provided on the Discharge Plan:  yes    Handoff Referral Completed: Yes    Additional Information:  Patient discharging to the [facility] today.  IMM completed.  Patient has my contact information and knows when to call.  No further  identified at this time. Will continue to follow in out patient clinic.     [facility] Valdivia [facility]  Lung Transplant   Phone: [phone] Pager: [phone]

## 2022-08-17 NOTE — PROGRESS NOTES
Home Infusion  Sofie  is discharging today to [facility] and will be going out on continuous enteral therapy.   She needs hook up of home feeding pump prior to discharge as well as  Teaching for her son who will be her CG upon discharge.  Met with Sofie and her son Gera at bedside and provided instruction in enteral administration using the Infinity pump. Had Gera program the pump, set up and prime the tubing, load pump and bag into the backpack, flush the feeding tube and initiate the continuous feed.   Instructed in 8 -12 hr hang time for formula and to use a new bag q 24 hrs.   Instructed on flushing the tube, use of gravity bag in event of pump failure and administration of protein supplement. Provided information about supplies and supply delivery, storage of formula and 24/7  phonesupport of [facility] staff.  [facility]  will be providing home nursing services after discharge and will reach out to Sofie to initiate services.   Gera was already taught med administration via the tube  and stated he feels comfortable managing that.     Sofie and Gera verbalized understanding of information given.  Gera demonstrated very good technique with set up and verbalized good understanding of process. Stated he feels comfortable with managing the ET independently.  Sofie's home enteral pump is running and she is ready for discharge from [facility] perspective.  Linda KIMBROUGH  Nurse Liaison  [facility] I [email or web address]  [address] [place] [address]  [email or web address]  [phone] M-F I [facility] main: [phone]

## 2022-08-17 NOTE — PROGRESS NOTES
Oxygen Documentation:   I certify that this patient, Sofie Rodriguez has been under my care (or a nurse practitioner or physican's assistant working with me). This is the face-to-face encounter for oxygen medical necessity.      Sofie Rodriguez is now in a chronic stable state and continues to require supplemental oxygen. Patient has continued oxygen desaturation due to Chronic Respiratory Failure with Hypoxia J96.11.    Alternative treatment(s) tried or considered and deemed clinically infective for treatment of Chronic Respiratory Failure with Hypoxia J96.11 include nebulizers, inhalers, steroids, pulmonary toileting, and pulmonary rehab.  If portability is ordered, is the patient mobile within the home? yes    **Patients who qualify for home O2 coverage under the CMS guidelines require ABG tests or O2 sat readings obtained closest to, but no earlier than 2 days prior to the discharge, as evidence of the need for home oxygen therapy. Testing must be performed while patient is in the chronic stable state. See notes for O2 sats.**                Susan Delacruz MD  Internal Medicine Hospitalist  [phone]

## 2022-08-17 NOTE — PROGRESS NOTES
CLINICAL NUTRITION SERVICES - BRIEF/DISCHARGE NOTE     Nutrition Prescription    Recommendations already ordered by Registered Dietitian (RD):  Continue current EN: Novasource Renal @ 35 ml/hr (840 ml) + 1 pkt Prosource    Future/Additional Recommendations:  Monitor tolerance of EN, adjust free water pending fluid intake      EVALUATION OF THE PROGRESS TOWARD GOALS   Diet: Full Liquids (for comfort, pt with severe gastroparesis)   Current Nutrition Support: Novasource Renal @ 35 ml/hr (840 ml) + 1 pkt Prosource daily provides 1720 kcal (30 kcal/kg), 87 g pro (1.5 g/kg), 154 g CHO, 602 ml free water, and 0 g fiber daily   Free Water: 30 mL q4 hours      NEW FINDINGS   Patient s discharge needs assessed and discharge planning has been conducted with the multidisciplinary transplant care team including physicians, pharmacy, social work and transplant coordinator.     Monitoring/Evaluation  Progress toward goals will be monitored and evaluated per protocol.    Once discharged, place outpatient nutrition consult via the transplant team if nutrition concerns arise.    Loida Norris RD, LD  6D RD pager [phone]  Weekend/ED RD pager [phone]

## 2022-08-17 NOTE — PROGRESS NOTES
Transplant Coordinator Note    Reason for visit: Post lung transplant follow up visit   Coordinator: Present   Caregiver:  SonNain    Health concerns addressed today:  1. discharge yesterday from hospital.   2. GI: continues to feel bloated. Has G tube drainage bag connected. Yesterday - had ice cream and jello.   3.  Respiratory - continues to use 1L  4. Nasal drainage      Activity/rehab: home OT/PT until ready for pulmonology   Oxygen needs: 1L O2  Pain management/RX:   Diabetic management: on insulin/lantus  Next Bronch due: POD 60  Risk Criteria Labs: negative 7/28/22  CMV status: D+/R+  Valcyte stopped: POD 8-90  EBV status: D+/R+  AC/asa:  On ASA 81mg  PJP prophylactic:     COVID:  1. COVID-19 infection (yes/no, date of most recent positive test):  no  2. Status/instructions given about COVID-19 vaccine:     Pt Education: medications (use/dose/side effects), how/when to call coordinator, frequency of labs, s/s of infection/rejection, call prior to starting any new medications, lab/vital sign book    Health Maintenance:     Last colonoscopy:     Next colonoscopy due:     Dermatology:    Vaccinations this visit:     Labs, CXR, PFTs reviewed with patient  Medication record reviewed and reconciled  Questions and concerns addressed    Patient Instructions  1. Continue to hydrate with 60-70 oz fluids daily.  2. Continue to exercise daily or most days of the week.  3. It doesn't seem like the COVID vaccine is working well in lung transplant patients. A number of lung transplant patients have gotten sick with COVID even after receiving the vaccines.  Based on our recent experience, it can be life-threatening to get COVID  even after being vaccinated. Please continue to act like you did not get the COVID vaccine - social distancing, wearing a mask, good hand hygiene, etc. If the people around you are vaccinated, it will help reduce the risk of you getting COVID. All members of your household should be  vaccinated.  4. It's okay to try plain yogurt. Nadya will have the dietitian, Kera, call you and follow up.   5. You can take Tylenol 650mg every 6 hours to stay on top of your pain.   6. Home OT/PT should be calling you.   7. Nadya will call you to get more gastric bags.   8. We will have the heart people call you about the Zio patch.     Next transplant clinic appointment: next Wednesday, 8/24 with CXR, labs and PFTs before appointment with Kaylin Triana  Next lab draw: pending tacrolimus level, otherwise in 1 week      AVS printed at time of check out

## 2022-08-17 NOTE — PROGRESS NOTES
Picked up patient this afternoon.    N: Generalized pain relieved with PRN Oxy  CV: WDL  R: 1L NC  GI: TF, full liq diet, ACHS sugar checks  : SBA to commode  Skin: No new deficits noted  Acces: Midline    Plan: POC, dischange tomorrow.

## 2022-08-17 NOTE — PROGRESS NOTES
Pulmonary Medicine  Cystic Fibrosis - Lung Transplant Team  Progress Note  2022       Patient: Sofie Rodriguez  MRN: [id number]  : 1960 (age 62 year old)  Transplant: 2022 (Lung), POD#50  Admission date: 2022    Assessment & Plan:     Sofie Rodriguez is a 62 year old female with a PMH significant for end-stage COPD, HTN, HFpEF, Mycobacterium peregrinum colonization, h/o hepatitis C, HECTOR s/p LEEP procedure, and former methamphetamine use.  S/p BSLT on 22 (lungs slightly undersized), extubated POD #2 but with persistent hypercapnia, mostly compensated and only slightly improved with intermittent NIPPV, discontinued 8/3.  Post-operative course complicated by bilateral pleural effusions, left radial artery thrombus (presumed secondary to arterial line) s/p thrombectomy , BACILIO, C diff, gastroparesis s/p GJ tube placement in IR , coffee ground G tube output s/p EGD 8/3 with pyloric ulcer noted, melena with hgb drop (), elevated LFTs () with extrahepatic mass on US and MRCP with increase in biliary dilation.  S/p ERCP  with findings concerning for hemobilia, now resolved and planning for OP follow-up.        Today's recommendations:  - CXR 8/15 with stable b/l loculated pleural effusions (personally reviewed)  - Nebs discontinued 8/15  - Exercise oximetry showed hypoxia at rest and with exertion requiring 1 L supplemental O2; encouraged to use oxygen during the daytime and at night  - Peripheral US (BUE and BLE) to screen for thrombus prior to discharge - neg  - DSA (last newly positive on 8/10) with slight uptrend in DQB2 MFI (3584) and plan for close monitoring   - Following pending pleural cultures, NGTD  - IgG due , EBV due , DSA in 1 week ()  - Recommend minimal PO intake d/t severe gastroparesis  - Repeat LUE arterial US 8/15 with dopplerable flow   - GI recommending f/up ERCP in ~ 2 months with Dr. Arroyo   - Follow-up with Dr. Gong in clinic on      S/p  bilateral sequential lung transplant (BSLT) for end stage COPD:  Persistent hypercapneic respiratory failure:   Persistent hypotension, Resolved:   Bilateral hydroPTX:  Right hemidiaphragm palsy: Explant pathology with severe emphysema with subpleural bullae formation, changes of chronic bronchitis, subpleural scars and patchy pulmonary edema; benign hilar lymph nodes.  Extubated 6/30 but with persistent hypercapnia without dyspnea, appears to be a respiratory drive problem.  Sniff (7/14) notable for right hemidiaphragm palsy.  S/p left apical chest tube 7/25-8/4, right surgical tube removed 8/6.  Bronch (8/2) with normal inspection of anastomoses.  NIPPV initially overnight for persistent hypercapnia, stopped 8/3 given lack of improvement with therapy, compensated hypercapnia persists.  CMV (8/8) negative.  CTA abdomen (8/11) noted similar appearance of bilateral loculated moderate pleural effusions with adjacent compressive atelectasis and LL predominant interlobular septal thickening.  S/p right diagnostic thoracentesis (8/12) with 100 ml removed.  CXR (8/13) demonstrates small right effusion, improved loculated left effusion.  Continues to use 1L NC for comfort without evidence of hypoxia.   - CXR 8/15 with stable bilateral pleural effusions (personally reviewed)  - Nebs: levalbuterol and Mucomyst BID discontinued 8/15  - Aerobika and incentive spirometry hourly while awake  - Exercise oximetry showed hypoxia at rest and with exertion requiring 1 L supplemental O2 (corrects to 99 - 100% with 1L or deep breathing); encouraged to use oxygen during the daytime and at night  - Diuresis per primary  - Peripheral US (BUE and BLE) to screen for thrombus prior to discharge - negative     Immunosuppression:  Induction therapy with basiliximab (and high dose IV steroid).  - Tacrolimus 4 mg BID (decreased 8/12, missed evening dose 8/11, via J tube).  Goal level 8-12.  Level 14.2, dose decreased to 3.5 mg BID (ensure  medication is consistently via J tube).  Repeat level 8/18 (ordered).  -  mg BID (increased 8/7, prior decrease for GI symptoms/leukopenia)   - Prednisone 12.5 mg qAM / 10 mg qPM, next taper due 8/18 (not yet ordered)  Date AM dose (mg) PM dose (mg)   8/4/22 12.5 10   8/18/22 10 10   9/15/22 10 7.5   10/13/22 7.5 7.5   11/10/22 7.5 5   12/8/22 5 5   1/5/23 5 2.5      Prophylaxis:   - Dapsone qMWF for PJP ppx  - VGCV for CMV ppx, CMV monthly (negative 8/8)  - Nystatin for oral candidiasis ppx, 6 month course  - See below for serologies and viral ppx:    Donor Recipient Intervention   CMV status Positive Positive Valganciclovir POD #8-90   EBV status Positive Positive EBV monitoring as below   HSV status N/A Positive Not indicated      Positive DSA: Newly positive DSA on 8/10, DQB2 with mfi 2155.   - Repeat DSA (8/15) increased with DQB2 and mfi 3584; will plan to repeat in 1 week or defer to OP pulmonologist (not ordered)     ID: Donor bronch cultures (OSH) with Strep beta hemolytic (not group A).  - Pleural fluid cultures (8/12) NGTD     H/o M. peregrinum colonization: NGTD post-transplant.  - AFB to be sent on all future bronchs     Streptococcus pneumoniae:  Stenotrophomonas maltophilia: Noted in recipient cultures at time of transplant.  S/p ceftazidime 6/28-7/10, vancomycin 7/7-7/8 and 6/28-6/30, and levofloxacin 7/10-7/12 for total 2 week ABX course.     Hypogammaglobulinemia: IgG adequate at time of transplant, repeat level low (336) on 7/28.  S/p IVIG dosing with premedication 7/30, tolerated well.  IgG on 8/10 low but stable at 590, replacement not indicated.  - Repeat IgG due 8/30     H/o hepatitis C:  Diagnosed in 1980s s/p 2m treatment, quant negative since 10/2017, last positive 2/20/17 ([phone]).  Ab positive on 6/2021 with negative HCV PCR.  H/o remote EtOH abuse.  MR elastography (4/27/21) with hepatology review and consult without any concerns post transplant.  Hepatitis C RNA negative and  hepatitis C antibody positive (old) on 6/28.  Repeat hepatitis C RNA negative 8/8 in setting of elevated LFTs.     EBV viremia: EBV level 11k, log 4.1 on 7/28.  Not likely to be clinically significant.  Repeat EBV (in the setting of elevated LFTs) decreased to 1,501, log 3.2 (8/8).  - EBV monthly monitoring due 9/8     Other relevant problems managed by primary team:      SVT:   Aflutter with RVR: SVT first noted on 7/14, prior to HD line placement, continues intermittently.  Aflutter with RVR to 200 7/17 triggered by activity.  EP consulted 7/17 given persistent tachycardia/dysrhythmia, midodrine discontinued.  AC deferred per EP given bleeding risk.  On metoprolol. Plan for Ziopatch and EP follow-up in October.      Left hand ischemia: Radial artery thrombosis identified on duplex doppler s/p thrombectomy on 7/2.  Primary team to clarify plan for AC with Vascular.  - Repeat LUE arterial US on 8/15 with b/l dopplerable flow      BACILIO:   Hyperkalemia: Likely multifactorial including medications (Bactrim, tacrolimus) and hypotension.  S/p non-tunneled HD line 7/14 with iHD 7/14-7/16, line removed 7/22.  Creatinine increased since 7/29 with Diamox and elevated tacrolimus level.  Potassium had been stable, elevated intermittently since 8/8.  Transitioned to low potassium TF formula 8/8 although intermittently held d/t GI symptoms  - Tacrolimus monitoring as above  - Florinef (started 8/9, increased 8/12)     Elevated LFTs:   Extrahepatic and intrahepatic biliary dilation: Acute rise in LFTs (alk phos 861, , AST 1,143 and bilirubin 0.9) on 8/8 (prior normal) associated with increased and different abdominal pain as below.  Amylase low, lipase normal.  Abdominal US (8/8) with extrahepatic mass at level of common bile duct with associated intrahepatic and common bile duct dilation, patent hepatic vasculature, and layering gallbladder sludge.  Concern for infection vs malignancy vs fluid collection.  MRCP (8/9) with  slight increase in moderate biliary dilation and gall bladder with moderate protein/hemorrhagic material.  Also noted to have cystic foci in the pancreatic head (most consistent with IPMN).  S/p ERCP (8/11) with findings concerning for hemobilia, stent placed across duodenum and in CBD.  CTA abdomen (8/11) without evidence of acute GI bleed.  GI team concerned bleeding originating from gallbladder (see note 8/12 for details).  Hepatic panel stable/improving.    - Repeat abdominal CT (or MRI) in 6 months for pancreatic findings (~2/11)  - Management per primary team with consult by Panc/Bili, General Surgery, and Surg Onc  - Repeat ERCP with Dr. Arroyo in 2 months, ~ 10/16/22; plan to coordinate with repeat EGD in 1 months      Severe gastroparesis:   Pyloric ulcer: Cramping abdominal pain 7/15.  AXR with large stool burden in colon, no obstruction or distention.  CT abdomen (7/15) with moderate to large gastric distention, otherwise without obstruction.  NG placed for LIS with moderate to large output for several days, did not tolerate clamping.  Gastric emptying study (7/20) with severe gastric emptying delay (95% retention at 4 hours), s/p GJ tube placement in IR 7/27.  S/p EGD 8/3 for coffee ground G tube output, noted to have pyloric ulcer and excessive gastric fluid and residual food.  Increased abdominal pain/cramping with trial of cycled TF 8/7 to 8/8.  AXR 8/8 without evidence of peritoneal free air or abnormally dilated loops of bowel.  Hemoglobin drop with dark tarry stools 8/8.  S/p repeat EGD (8/11) with mild erythema 2/2 GJ tube trauma seen near insertion site but no active bleeding.  - PPI BID  - Simethicone prn  - TF and ALL medications via J tube  - G tube to gravity drainage; full liquid diet for comfort only (recommend minimal intake d/t need for increased pain meds with increased PO and known gastroparesis)  - Repeat EGD in 8 weeks (~10/6) to check healing of ulcer     C diff infection: Abdominal  "pain with diarrhea noted 7/8, AXR without dilated bowel, moderate colonic stool burden.  C diff positive 7/11.  Loose stools (on tube feeds) stable.  S/p PO vancomycin (7/11-7/28).  Repeat C diff negative 8/6.  Low threshold to resume vancomycin in the future with ABX course     Hypomagnesemia: Suppressed absorption d/t CNI.  Continue daily magnesium with replacement protocol prn.     We appreciate the excellent care provided by the [place] [id number] team.  Recommendations communicated via in person rounding and this note.  Will continue to follow along closely, please do not hesitate to call with any questions or concerns.    Susan Miller MD  Pulmonary, Allergy, Critical Care, and Sleep Medicine   [facility]   Pager: [phone]    Subjective & Interval History:     Feels well today. Breathing remains comfortable. Walking is going well without dyspnea or pain. Some minimal cough with clear phlegm, improving. + BMs that are soft. Tolerated beef broth without nausea, abdominal pain.     Review of Systems:     C: No fever, no chills  INTEGUMENTARY/SKIN: No rash or obvious new lesions  ENT/MOUTH: No nasal congestion or drainage  RESP: See interval history  CV: No chest pain, no palpitations, no peripheral edema, no orthopnea  GI: no nausea, no vomiting, + stable loose but formed stools, no reflux symptoms  : No dysuria  MUSCULOSKELETAL: No myalgias, no arthralgias  ENDOCRINE: Blood sugars intermittently elevated  NEURO: No headache, no numbness or tingling  PSYCHIATRIC: Mood stable    Physical Exam:     All notes, images, and labs from past 24 hours (at minimum) were reviewed.    Vital signs:  Temp: 98.1  F (36.7  C) Temp src: Oral BP: 131/72 Pulse: 96   Resp: 12 SpO2: 99 % O2 Device: Nasal cannula Oxygen Delivery: 1 LPM Height: 157.5 cm (5' 2\") Weight: 70.5 kg (155 lb 6.4 oz)  I/O:       Intake/Output Summary (Last 24 hours) at 8/17/2022 1435  Last data filed at 8/17/2022 1145  Gross per 24 hour   Intake 960 " ml   Output 675 ml   Net 285 ml     Constitutional: Sitting up in a chair, in no apparent distress.   HEENT: Eyes with pink conjunctivae, anicteric.  Oral mucosa moist without lesions.   PULM: Non-labored breathing on 1L NC. Removed and SpO2 remains about 88% with good waveform. DIminished in bases but fair air movement b/l. No wheezes, rales, rhonchi.   CV: Tachycardia.  RRR. No mrg. No peripheral edema. Clamshell wound without dehiscence, erythema, or edema.   ABD: NABS, soft, mild tenderness of upper quadrants of abdomen, nondistended.  PEG/J tube site cdi.  MSK: Moves all extremities.  No apparent muscle wasting.   NEURO: Alert and oriented, conversant.   SKIN: Warm, dry.  No rash on limited exam.   PSYCH: Mood stable.     Lines, Drains, and Devices:  Midline Catheter Double Lumen (Active)   Site Assessment WDL 08/15/22 0400   External Cath Length (cm) 1 cm 08/14/22 0903   Initial Extremity Circumference (cm) 29 cm 07/28/22 1455   Dressing Intervention Chlorhexidine patch;Transparent 08/14/22 2000   Line Necessity Yes, meets criteria 08/15/22 0400   Dressing Change Due 08/21/22 08/14/22 2000   Purple - Status saline locked 08/15/22 0400   Purple - Cap Change Due 08/16/22 08/14/22 2000   Red - Status saline locked 08/15/22 0400   Red - Cap Change Due 08/16/22 08/14/22 2000   Extravasation? No 08/14/22 2000   Number of days: 18     Data:     LABS    CMP:   Recent Labs   Lab 08/17/22  0943 08/17/22  0751 08/17/22  0706 08/17/22  0452 08/16/22  1614 08/16/22  1417 08/14/22  0927 08/14/22  0653 08/13/22  1238 08/13/22  0841 08/12/22  1158 08/12/22  0955 08/12/22  0835 08/12/22  0736 08/11/22  0747 08/11/22  0610   NA  --   --  140  --   --  139  --  143  --  144  --   --   --  139  --  138   POTASSIUM  --   --  5.0  --   --  4.8  --  4.2  --  5.2  --   --   --  5.2   < > 5.4*   CHLORIDE  --   --  95*  --   --  95*  --  101  --  100  --   --   --  99  --  97*   CO2  --   --  39*  --   --  37*  --  35*  --  36*  --    --   --  35*  --  37*   ANIONGAP  --   --  6*  --   --  7  --  7  --  8  --   --   --  5*  --  4*   * 162* 176* 167*   < > 173*   < > 152*   < > 139*   < >  --    < > 133*   < > 124*   BUN  --   --  68.1*  --   --  71.1*  --  50.2*  --  42.4*  --   --   --  42.9*  --  49.5*   CR  --   --  1.48*  --   --  1.56*  --  1.58*  --  1.46*  --   --   --  1.42*  --  1.50*   GFRESTIMATED  --   --  40*  --   --  38*  --  37*  --  41*  --   --   --  42*  --  39*   ESTUAROD  --   --  9.0  --   --  9.3  --  8.8  --  9.1  --   --   --  8.7*  --  8.9   MAG  --   --  2.2  --   --   --   --   --   --   --   --   --   --  2.1  --   --    PHOS  --   --   --   --   --   --   --   --   --   --   --   --   --  3.9  --   --    PROTTOTAL  --   --   --   --   --   --   --  5.2*  --  5.6*  --  5.6*  --  5.0*  --  4.9*   ALBUMIN  --   --   --   --   --   --   --  2.6*  --  2.8*  --   --   --  2.6*  --  2.4*   BILITOTAL  --   --   --   --   --   --   --  <0.2  --  0.2  --   --   --  0.2  --  0.2   ALKPHOS  --   --   --   --   --   --   --  278*  --  345*  --   --   --  344*  --  412*   AST  --   --   --   --   --   --   --  18  --  18  --   --   --  15  --  19   ALT  --   --   --   --   --   --   --  90*  --  120*  --   --   --  147*  --  215*    < > = values in this interval not displayed.     CBC:   Recent Labs   Lab 08/16/22  1417 08/15/22  0628 08/14/22  1122 08/13/22  0841   WBC 11.7* 10.5 10.4 7.9   RBC 2.94* 2.86* 3.12* 3.05*   HGB 9.1* 8.8* 9.4* 9.2*   HCT 29.7* 28.9* 31.8* 30.4*   * 101* 102* 100   MCH 31.0 30.8 30.1 30.2   MCHC 30.6* 30.4* 29.6* 30.3*   RDW 16.4* 16.5* 16.7* 16.3*    [phone]       INR:   Recent Labs   Lab 08/12/22  1657 08/11/22  1810   INR 0.93 0.95       Glucose:   Recent Labs   Lab 08/17/22  0943 08/17/22  0751 08/17/22  0706 08/17/22  0452 08/17/22  0028 08/16/22  2059   * 162* 176* 167* 165* 120*       Blood Gas: No lab results found in last 7 days.    Culture Data No results for  input(s): CULT in the last 168 hours.    Virology Data:   Lab Results   Component Value Date    FLUAH1 Not Detected 06/29/2022    FLUAH3 Not Detected 06/29/2022    OR6156 Not Detected 06/29/2022    IFLUB Not Detected 06/29/2022    RSVA Not Detected 06/29/2022    RSVB Not Detected 06/29/2022    PIV1 Not Detected 06/29/2022    PIV2 Not Detected 06/29/2022    PIV3 Not Detected 06/29/2022    HMPV Not Detected 06/29/2022       Historical CMV results (last 3 of prior testing):  Lab Results   Component Value Date    CMVQNT Not Detected 08/08/2022    CMVQNT Not Detected 07/25/2022     No results found for: CMVLOG    Urine Studies    Recent Labs   Lab Test 08/01/22  0319 07/08/22  0831 07/05/22  1004   URINEPH 8.0* 5.5 5.5   NITRITE Negative Negative Negative   LEUKEST Negative Negative Negative   WBCU  --  1 5       Most Recent [place] Pulmonary Function Testing (FVC/FEV1 only)  FVC-Pre   Date Value Ref Range Status   04/29/2022 1.82 L    11/11/2021 2.17 L    06/14/2021 2.00 L      FVC-%Pred-Pre   Date Value Ref Range Status   04/29/2022 58 %    11/11/2021 70 %    06/14/2021 64 %      FEV1-Pre   Date Value Ref Range Status   04/29/2022 0.51 L    11/11/2021 0.53 L    06/14/2021 0.54 L      FEV1-%Pred-Pre   Date Value Ref Range Status   04/29/2022 20 %    11/11/2021 21 %    06/14/2021 21 %        IMAGING    Recent Results (from the past 48 hour(s))   XR Chest 2 Views    Narrative    PA and lateral chest    HISTORY: Follow-up lung transplant    COMPARISON STUDY: 8/12/2022    FINDINGS: Cardiac silhouette is nonenlarged. Bilateral loculated  pleural effusions left greater than right unchanged. Left axillary  PICC      Impression    IMPRESSION: No significant change in loculated bilateral pleural  effusions left greater than right.    JOHANN MORAES MD         SYSTEM ID:  [id number]

## 2022-08-17 NOTE — PROGRESS NOTES
Care Management Discharge Note    Discharge Date: 08/17/2022       Discharge Disposition: Other (Comments)    Discharge Services:  HC     Discharge DME:  4 wheeled walker    Discharge Transportation: family or friend will provide    Private pay costs discussed: Not applicable    PAS Confirmation Code:  n/a  Patient/family educated on Medicare website which has current facility and service quality ratings:  n/a    Education Provided on the Discharge Plan:  yes  Persons Notified of Discharge Plans: daughter Julia  Patient/Family in Agreement with the Plan: yes     Handoff Referral Completed: Yes    Additional Information:  Patient being discharged to [facility] apartments after hospital stay with lung transplant. Family transporting patient there. [facility] updated on discharge plan for home TF. [facility] agency updated yesterday, AVS faxed over. IMM went over with patient's daughter via phone, verbal approval for signature, faxed to [facility] ([phone]). New walked obtained from [facility]. Oxygen tank being delivered by [employer] for transport, family bringing patient's home oxygen concentrator. RNCC available for any further needs.    [facility]    [facility]  Phone # [phone]  Fax # [phone]     [facility] [phone], Fax [phone]    DME    [facility]  [facility] [address]   [address]  Phone: [phone]  Fax: [phone]    Vendor to supply--4 wheeled walker      _______________________________________________        Corey Alva BSN, BA, RN, CMSRN, RNCC  Covering Units 6D/OBS  Pager: [phone]  Phone: [phone]  6th floor Weekend/Holiday Pager: [phone]  Observation weekend/after hours: [phone]

## 2022-08-17 NOTE — PHARMACY-TRANSPLANT NOTE
Solid Organ Transplant Recipient Prior to Discharge Note    62 year old female s/p BSLT transplant on 6/28/22.    Pharmacy has monitored for medication interactions and immunosuppression levels in conjunction with the multidisciplinary team. In anticipation for discharge, medication therapy needs have been addressed daily throughout the current admission via multidisciplinary rounds and/or discussions, order verification, daily clinical pharmacy review, and communication with prescribers.  Andreia Samuels, PharmD.

## 2022-08-17 NOTE — PLAN OF CARE
Goal Outcome Evaluation:    Plan of Care Reviewed With: patient     Overall Patient Progress: improving    Outcome Evaluation: No acute events overnight. Vital signs stable on 1L NC. Voiding per commode. Generalized back and incisonal pain managed with prn oxy.

## 2022-08-17 NOTE — DISCHARGE SUMMARY
[facility]  Hospitalist Discharge Summary      Date of Admission:  6/28/2022  Date of Discharge:  8/17/2022  Discharging Provider: Susan Delacruz MD  Discharge Service: Hospitalist Service, GOLD TEAM 10    Discharge Diagnoses   Persistent chronic hypercapneic and hypoxic respiratory failure secondary to end stage COPD  Bilateral sequential lung transplant for end stage COPD complicated by bilateral hydropneumothorax (resolved) and right hemidiaphragm palsy  Chronic hypoxic respiratory failure secondary to lung transplant  Hemobilia with acute hepatitis and transaminemia, s/p ERCP and CBD stent  Malnutrition secondary to severe gastroparesis, s/p PEG and now on tube feeds  Acute blood loss anemia in the setting of acute on chronic anemia of chronic disease, acute component due to peptic ulcer at pyloric junction  Hypogammaglobulinemia  History of hepatitis C  EBV viremia  Post-op paroxysmal atrial flutter/fibrillation  Left hand ischemia secondary to radial artery thrombosis  BACILIO with hyperkalemia, hypermagnesemia, hyperphosphatemia, and metabolic alkalosis likely due to supratherapeutic tacrolimus  Hypotension due to vasoplegia, resolved  History of HTN  History of HFpEF  C difficile colitis  Steroid induced hyperglycemia  Incidental IPMN    Follow-ups Needed After Discharge   Follow-up Appointments     Adult [facility] Follow-up and recommended labs and tests      Follow up with [facility] Transplant Pulmonology on 8/18 for hospital   follow- up. No follow up labs or test are needed.  Follow up with [facility] GI in 8 weeks for an endoscopy/ERCP.  Follow up with [facility] EP Cardiology in 4-8 weeks to discuss to results of   Ziopatch and to discuss ongoing aspirin.  MRI/MRCP with IV gadolinium for IPMN follow up in 6 months.    Appointments on [place] and/or [facility] (with [facility] or [facility]   provider or service). Call [phone] if you haven't heard regarding    these appointments within 7 days of discharge.             Unresulted Labs Ordered in the Past 30 Days of this Admission     Date and Time Order Name Status Description    8/12/2022 11:58 AM Acid-Fast Bacilli Culture and Stain In process     8/12/2022 11:58 AM Fungus Culture, non-blood Preliminary     8/12/2022 11:58 AM Anaerobic bacterial culture Preliminary     7/24/2022  3:27 PM Fungal or Yeast Culture Routine Preliminary     7/24/2022  3:27 PM Acid-Fast Bacilli Culture and Stain In process     7/20/2022  3:26 PM Acid-Fast Bacilli Culture and Stain In process     7/12/2022 11:52 AM Acid-Fast Bacilli Culture and Stain In process     6/29/2022  1:43 PM Acid-Fast Bacilli Culture and Stain In process     6/28/2022  9:54 PM Acid-Fast Bacilli Culture and Stain In process     6/28/2022  9:44 PM Acid-Fast Bacilli Culture and Stain In process       These results will be followed up by Transplant Pulmonary    Discharge Disposition   Discharged to home (staying at [facility])  Condition at discharge: Stable      Hospital Course   Sofie Rodriguez is a 62 year old woman with a history of end stage COPD, HTN, HFpEF who was admitted on 6/28 for bilateral lung transplant.    Persistent chronic hypercapneic and hypoxic respiratory failure secondary to end stage COPD  Bilateral sequential lung transplant for end stage COPD (6/28/2022) complicated by bilateral hydropneumothorax (resolved) and right hemidiaphragm palsy  Chronic hypoxic respiratory failure secondary to lung transplant  Admitted for transplant on 6/28/2022. Persistent oxygen need suspected to be multifactorial from right hemidiaphragm palsy, decreased respiratory drive, muscle weakness.  Also of note, transplanted lungs were small for body size and could contribute to decreased respiratory compensation for hypercarbia.  Maintained on supplemental O2.  Immunosuppression and prophylaxis per Transplant Pulmonary (see note from Dr. Miller on 8/17 for full details).   Will have close follow up with Transplant Pulmonary.    Hemobilia with acute hepatitis and transaminemia, s/p ERCP and CBD stent  Had an acute rise in LFTs to the 700s-100s and bilirubin elevation to 0.9  MRCP showed gallbladder with a moderate amount of proteinaceous/hemorrhagic material.  Found to have large amount of blood drainage from CBD upon inspection during ERCP and EGD.  Had stent placed in CBD.  GI will follow up as an outpatient.  -  GI repeat EGD/ERCP in 8 weeks    Malnutrition secondary to severe gastroparesis, s/p PEG and now on tube feeds  Will continue continuous J tube feeds on discharge.    Acute blood loss anemia in the setting of acute on chronic anemia of chronic disease, acute component due to peptic ulcer at pyloric junction  Had acute blood loss after transplant but stabilized.  Ulcer seen on EGD on 8/3, with associated swelling casuing gastric outlet obstruction.  EGD on 8/11 without active bleeding of this same ulcer.  Hgb stabilized and improved.    Hypogammaglobulinemia  IgG adequate at time of transplant, repeat level low (336) on 7/28.  S/p IVIG dosing with premedication 7/30, tolerated well.  IgG on 8/10 low but stable at 590, replacement not indicated.  - Repeat IgG due 8/30     History of hepatitis C  Diagnosed in 1980s s/p 2m treatment, quant negative since 10/2017, last positive 2/20/17 ([phone]).  Ab positive on 6/2021 with negative HCV PCR.  H/o remote EtOH abuse.  MR elastography (4/27/21) with hepatology review and consult without any concerns post transplant.  Hepatitis C RNA negative and hepatitis C antibody positive (old) on 6/28.  Repeat hepatitis C RNA negative 8/8 in setting of elevated LFTs.    EBV viremia  EBV level 11k, log 4.1 on 7/28.  Not likely to be clinically significant.  Repeat EBV (in the setting of elevated LFTs) decreased to 1,501, log 3.2 (8/8).  - EBV monthly monitoring due 9/8    Post-op paroxysmal atrial flutter/fibrillation  SVT first noted on 7/14,  prior to HD line placement.  Had aflutter with RVR to 200 7/17 triggered by activity.  EP consulted 7/17 given persistent tachycardia/dysrhythmia, midodrine discontinued.  AC deferred per EP given bleeding risk.    -  Continue metoprolol  -  Ziopatch for 14 days  -  EP followup in 1-2 months    Left hand ischemia secondary to radial artery thrombosis  Radial artery thrombosis identified on duplex doppler s/p thrombectomy on 7/2.  Later had dopperable flow per US.    BACILIO with hyperkalemia, hypermagnesemia, hyperphosphatemia, and metabolic alkalosis likely due to supratherapeutic tacrolimus  Baseline renal function was normal prior to surgery. New onset renal failure after transplant, likely multifactorial due to pre-renal hypotension, less likely nephrotoxic agents. Overall kidney function improving.     Hypotension due to vasoplegia, resolved  History of HTN  History of HFpEF  Likely vasoplegia post operatively. Last echo 7/7 normal EF with good LV function. S/p hydrocortisone 7/6-7/9 and fludrocortisone 7/6-7/11 without significant improvement.  Blood pressure later normalized.    C difficile colitis  Completed course of vancomycin inpatient.  Repeat testing negative.    Steroid induced hyperglycemia  Titrated insulin to meet needs due to steroid induced hyperglycemia.  - testing 4 times a day is needed due to insulin dependence due to hyperglycemia caused by steroids    Incidental IPMN  Found on MRCP 8/9/22. Cystic foci in the pancreatic head measuring 4 x 3 mm superiorly and 4  x 3 mm inferiorly. Per [facility] guidelines on IPMN:  - Follow up imaging in 6 months to 1 year, then lengthen interval to 2-3 years if no change  - Ordered MRI/MRCP with gadolinium per Radiology recommendations to be done in 6 months    Consultations This Hospital Stay   NURSING TO CONSULT FOR VASCULAR ACCESS CARE IP CONSULT  NURSING TO CONSULT FOR VASCULAR ACCESS CARE IP CONSULT  SPEECH LANGUAGE PATH ADULT IP CONSULT  CARDIAC REHAB IP  CONSULT  PULMONARY CF/TRANSPLANT ADULT IP CONSULT  OCCUPATIONAL THERAPY ADULT IP CONSULT  PHYSICAL THERAPY ADULT IP CONSULT  NUTRITION SERVICES ADULT IP CONSULT  SOT MEDICATION HISTORY IP PHARMACY CONSULT  PHARMACY IP CONSULT  NUTRITION SERVICES ADULT IP CONSULT  REGIONAL ANESTHESIA PAIN SERVICE ADULT IP CONSULT  NUTRITION SERVICES ADULT IP CONSULT  PHARMACY IP CONSULT  CARE MANAGEMENT / SOCIAL WORK IP CONSULT  PHARMACY TO DOSE VANCO  SLP FEES FIBEROPTIC ENDOSCOPIC SWALLOW IP CONSULT  GI LUMINAL ADULT IP CONSULT  VASCULAR SURGERY ADULT IP CONSULT  PHARMACY IP CONSULT  PHARMACY IP CONSULT  PHARMACY IP CONSULT  PHARMACY IP CONSULT  PHARMACY IP CONSULT  PHARMACY IP CONSULT  NEPHROLOGY ICU IP CONSULT  PHARMACY CRRT IP CONSULT  PHARMACY TO DOSE VANCO  INTERVENTIONAL RADIOLOGY ADULT/PEDS IP CONSULT  NURSING TO CONSULT FOR VASCULAR ACCESS CARE IP CONSULT  WOUND OSTOMY CONTINENCE NURSE  IP CONSULT  CARDIOLOGY ELECTROPHYSIOLOGY (EP) IP CONSULT  NURSING TO CONSULT FOR VASCULAR ACCESS CARE IP CONSULT  PHARMACY IP CONSULT  PHARMACY IP CONSULT  NURSING TO CONSULT FOR VASCULAR ACCESS CARE IP CONSULT  INTERVENTIONAL RADIOLOGY ADULT/PEDS IP CONSULT  INTERVENTIONAL RADIOLOGY ADULT/PEDS IP CONSULT  VASCULAR ACCESS ADULT IP CONSULT  REGIONAL ANESTHESIA PAIN SERVICE ADULT IP CONSULT  NEPHROLOGY GENERAL ADULT IP CONSULT  PATIENT LEARNING CENTER IP CONSULT  GI LUMINAL ADULT IP CONSULT  PHARMACY IP CONSULT  NURSING TO CONSULT FOR VASCULAR ACCESS CARE IP CONSULT  DIABETES EDUCATION IP CONSULT  PHARMACY LIAISON FOR MEDICATION COVERAGE CONSULT  INTERVENTIONAL PULMONARY ADULT IP CONSULT  SURGERY GENERAL ADULT IP CONSULT  NURSING TO CONSULT FOR VASCULAR ACCESS CARE IP CONSULT  PHARMACY IP CONSULT  PHARMACY IP CONSULT  NUTRITION SERVICES ADULT IP CONSULT  PHARMACY IP CONSULT  PATIENT LEARNING CENTER IP CONSULT    Code Status   Full Code    Time Spent on this Encounter   I, Susan Delacruz MD, personally saw the patient today and spent  greater than 30 minutes discharging this patient.       Susan Delacruz MD  [facility]  [address] [address]  Phone: [phone]  Fax: [phone]  ______________________________________________________________________    Physical Exam   Vital Signs:                    Weight: 155 lbs 6.4 oz  General Appearance: Sitting in a chair in [facility]  Respiratory: CTAB, breathing comfortably with no use of accessory muscles  Cardiovascular: RRR, no m/r/g, peripheral pulses intact  GI: NABS, soft, NT, ND  Skin: No rashes  Neuro: Alert, appropriately interactive        Primary Care Physician   Kaylin Triana    Discharge Orders      Home Infusion Referral      Home Care Referral      Adult GI  Referral - Procedure Only      Reason for your hospital stay    You were hospitalized for a lung transplant.     Activity    Your activity upon discharge: activity as tolerated     Adult [facility] Follow-up and recommended labs and tests    Follow up with [facility] Transplant Pulmonology on 8/18 for hospital follow- up. No follow up labs or test are needed.  Follow up with [facility] GI in 8 weeks for an endoscopy/ERCP.  [facility] in 4-8 weeks to discuss to results of Ziopatch and to discuss ongoing aspirin.  MRI/MRCP with IV gadolinium for IPMN follow up in 6 months.    Appointments on [place] and/or [facility] (with [facility] or [facility] provider or service). Call [phone] if you haven't heard regarding these appointments within 7 days of discharge.     Full Code     Adult Leadless EKG Monitor 8 to 14 Days     Oxygen Adult/Peds    Oxygen Documentation:   I certify that this patient, Sofie Rodriguez has been under my care (or a nurse practitioner or physican's assistant working with me). This is the face-to-face encounter for oxygen medical necessity.      Sofie Rodriguez is now in a chronic stable state and continues to require supplemental  oxygen. Patient has continued oxygen desaturation due to Chronic Respiratory Failure with Hypoxia J96.11.    Alternative treatment(s) tried or considered and deemed clinically infective for treatment of Chronic Respiratory Failure with Hypoxia J96.11 include nebulizers, inhalers, steroids, pulmonary toileting, and pulmonary rehab.  If portability is ordered, is the patient mobile within the home? yes    **Patients who qualify for home O2 coverage under the CMS guidelines require ABG tests or O2 sat readings obtained closest to, but no earlier than 2 days prior to the discharge, as evidence of the need for home oxygen therapy. Testing must be performed while patient is in the chronic stable state. See notes for O2 sats.**     Jorge Alberto MORRISON, the undersigned, certify that the above prescribed supplies are medically necessary for this patient and is both reasonable and necessary in reference to accepted standards of medical and necessary in reference to accepted standards of medical practice in the treatment of this patient's condition and is not prescribed as a convenience.      Diet    Follow this diet upon discharge: Orders Placed This Encounter      Adult Formula Drip Feeding: Continuous Novasource Renal; Jejunostomy; Goal Rate: 35 mL/hr- continuous; mL/hr; From: 8:00 PM; 8:00 PM; Medication - Feeding Tube Flush Frequency: At least 15-30 mL water before and after medication administration and...      Full Liquid Diet       Significant Results and Procedures   Most Recent 3 CBC's:Recent Labs   Lab Test 08/18/22  1033 08/16/22  1417 08/15/22  0628   WBC 8.7 11.7* 10.5   HGB 8.6* 9.1* 8.8*   * 101* 101*    404 390     Most Recent 3 BMP's:Recent Labs   Lab Test 08/18/22  1033 08/17/22  0943 08/17/22  0751 08/17/22  0706 08/16/22  1614 08/16/22  1417     --   --  140  --  139   POTASSIUM 4.7  --   --  5.0  --  4.8   CHLORIDE 97  --   --  95*  --  95*   CO2 44*  --   --  39*  --  37*   BUN 74*  --   --   68.1*  --  71.1*   CR 1.56*  --   --  1.48*  --  1.56*   ANIONGAP 2*  --   --  6*  --  7   ESTUARDO 9.0  --   --  9.0  --  9.3   * 145* 162* 176*   < > 173*    < > = values in this interval not displayed.   ,   Results for orders placed or performed during the hospital encounter of 06/28/22   XR Chest 2 Views    Narrative    Exam: XR CHEST 2 VW, 6/28/2022 2:09 PM    Comparison: CT chest 4/4/2022, chest x-ray 6/14/2021    History: pre-op lung transplant    Findings:  PA and lateral views of the chest.  Trachea is midline. The heart size  is within normal limits. Atherosclerotic ectasia of the aortic arch.  Hyperinflated lungs with emphysematous changes. No acute airspace  disease. There is no pneumothorax or pleural effusion. The upper  abdomen is unremarkable. No acute osseous abnormality. Flattened  hemidiaphragms on the lateral view.      Impression    Impression:   Hyperinflated lungs with emphysematous changes. No acute airspace  disease.     I have personally reviewed the examination and initial interpretation  and I agree with the findings.    ALVINA HARRY MD         SYSTEM ID:  [id number]   XR Chest Port 1 View    Narrative    XR CHEST PORT 1 VIEW  6/29/2022 3:23 AM      HISTORY: s/p bilateral lung transplant    COMPARISON: 6/28/2022    FINDINGS:   Single AP view of the chest. New postoperative changes of bilateral  lung transplantation. Endotracheal tube tip overlying the mid thoracic  trachea. Right IJ [place]-Dexter catheter tip overlying the main pulmonary  artery. Biapical and bibasilar chest tubes. Pericardial drain. Enteric  tube courses beyond the field-of-view. Trachea is midline. Cardiac  silhouette is within normal limits. No pneumothorax or significant  pleural effusion. Perihilar and bibasilar streaky opacities.  Subcutaneous emphysema along the lateral chest walls.      Impression    IMPRESSION:   1. Postoperative chest with mild perihilar and bibasilar atelectasis.  2. Endotracheal tube tip  approximately 6 cm above the nabil.  3. Right IJ [place]-Dexter catheter tip in the main pulmonary artery.    I have personally reviewed the examination and initial interpretation  and I agree with the findings.    CECI REGAN DO         SYSTEM ID:  [id number]   POC US Guidance Needle Placement    Impression    Bilateral CLARI catheters   XR Abdomen Port 1 View    Narrative    EXAMINATION:  XR ABDOMEN PORT 1 VIEWS 6/29/2022 12:27 PM     COMPARISON: PET 7/14/2021.    HISTORY: Verify small bowel feeding tube bedside placement.    FINDINGS: Frontal view of the abdomen. Feeding tube tip likely  projects over distended distal stomach. Bibasilar chest tubes.  [place]-Dexter catheter tip projects over the main pulmonary artery. No  abnormally dilated loops of bowel. Postsurgical changes of bilateral  lung transplantation with clamshell sternotomy wires.       Impression    IMPRESSION: Feeding tube tip overlying the central abdomen likely  projecting over distended distal stomach.    I have personally reviewed the examination and initial interpretation  and I agree with the findings.    NIKOS JAVED MD         SYSTEM ID:  [id number]   XR Chest Port 1 View    Narrative    EXAM: XR CHEST PORT 1 VIEW  6/29/2022 5:22 PM     HISTORY:  lung txp       COMPARISON:  Radiograph earlier same day, 6/20/2022.    TECHNIQUE: AP view of the chest at 60 degrees    FINDINGS:   Devices, lines, tubes: Endotracheal tube, bilateral chest tubes,  pericardial drain, and right internal jugular [place]-Dexter catheter are  in similar positioning. Interval placement of a bulge is a catheters.  Feeding tube tip coursing inferior to the left hemidiaphragm with tip  out of the field of view. Clamshell sternotomy wires are intact.    Postoperative changes of bilateral lung transplantation. The trachea  is midline. The cardiomediastinal silhouette is within normal limits.  Similar perihilar and bibasilar streaky opacities..  No appreciable  pneumothorax, pleural  effusion, or focal consolidative opacity. No  acute osseous abnormality.  Decreased amount of percutaneous emphysema  projecting over the lateral chest walls.      Impression    IMPRESSION:   1. Similar perihilar and bibasilar streaky opacities, favoring  postoperative atelectasis and/or edema.  2. No appreciable pneumothoraces with bilateral chest tubes in place.    I have personally reviewed the examination and initial interpretation  and I agree with the findings.    MONSERRAT SEGAL MD         SYSTEM ID:  [id number]   XR Chest Port 1 View    Narrative    XR CHEST PORT 1 VIEW  6/30/2022 1:05 AM      HISTORY: Post-Op Lung    COMPARISON: 6/29/2022    FINDINGS:   Single AP view of the chest. Postoperative changes of bilateral lung  transplantation. Feeding tube courses beyond the field-of-view.  Endotracheal tube tip overlying the mid thoracic trachea. Right IJ  [place]-Dexter catheter tip over the proximal right pulmonary artery.  Bilateral chest tubes and mediastinal drain in similar position.  Stable mediastinum. No pneumothorax or significant right pleural  effusion. Small left pleural effusion. Largely unchanged perihilar and  left basilar streaky opacities.      Impression    IMPRESSION:   1. Stable support devices.  2. Largely unchanged perihilar and left basilar opacities with small  left pleural effusion.     I have personally reviewed the examination and initial interpretation  and I agree with the findings.    YANNICK BENAVIDEZ MD         SYSTEM ID:  [id number]   XR Abdomen Port 1 View    Narrative    XR ABDOMEN PORT 1 VIEWS  6/30/2022 1:10 AM      HISTORY: Verify small bowel feeding tube bedside placement    COMPARISON: 6/29/2022    FINDINGS:   Single supine view of the chest and abdomen. Refer to same day chest  radiograph report for findings of the visualized chest. Feeding tube  tip over the distal distended stomach. Nonobstructive bowel gas  pattern. No definite pneumatosis or portal venous gas.       Impression    IMPRESSION:   Feeding tube tip over the distal distended stomach. Recommend  repositioning if postpyloric is desired.    I have personally reviewed the examination and initial interpretation  and I agree with the findings.    YANNICK BENAVIDEZ MD         SYSTEM ID:  [id number]   XR Feeding Tube Placement    Narrative    Feeding tube placement.    Comparison:  none.    History: Repositioning of feeding tube (needs postpyloric and lung  transplant).    Fluoroscopy time:  418.9 seconds    Technique: After injection of Xylocaine gel into the right nostril, a  feeding tube was advanced under fluoroscopic guidance.    Findings: The previously placed feeding tube was identified in the  gastric body. Multiple attempts were made to advance the feeding tube  beyond the pylorus however were ultimately unsuccessful. Approximately  1 L of gastric fluid was aspirated from the stomach. The feeding tube  was ultimately left in the gastric antrum. The feeding tube was  secured to the patient's primary care via bridle.      Impression    Impression: Unsuccessful feeding tube advancement. Feeding tube left  within the gastric antrum. Can consider follow up abdominal radiograph  on 7/2/2021 to evaluate positioning.    Dr. Harry, faculty physician, was present during the entire  procedure, including personally attempting tube placement.    I have personally reviewed the examination and initial interpretation  and I agree with the findings.    ALVINA HARRY MD         SYSTEM ID:  [id number]   XR Abdomen Port 1 View    Narrative    EXAMINATION:  XR ABDOMEN PORT 1 VIEWS 6/30/2022 4:34 PM     COMPARISON: Earlier same day radiograph.    HISTORY: assess FT location.    TECHNIQUE: Frontal view of the abdomen.    FINDINGS: Enteric tube tip projects over the left hemipelvis.  Prominent gaseous distention of the stomach. Partially visualized  chest tubes in the thorax. Please see chest radiograph. Dong catheter  in the  pelvis.      Impression    IMPRESSION: Enteric tube tip projects over the distal distended  stomach, likely near the pylorus versus proximal duodenum. Could  consider further advancement with repeat radiograph to reevaluate.    I have personally reviewed the examination and initial interpretation  and I agree with the findings.    YANNICK BENAVIDEZ MD         SYSTEM ID:  [id number]   XR Chest Port 1 View    Narrative    Chest radiograph  7/1/2022 1:42 AM      HISTORY: Postop lung    COMPARISON: 6/30/2022    FINDINGS:   Single AP view of the chest. Postoperative changes of bilateral lung  transplantation. Feeding tube courses beyond the field of view. Right  IJ [place]-Dexter tip in the distal main/proximal right pulmonary artery.  Interval extubation. Stable bilateral chest tubes and mediastinal  drain. Stable mediastinum. No pneumothorax or significant right  pleural effusion. Small left pleural effusion. Increased perihilar and  bibasilar interstitial and airspace opacities.      Impression    IMPRESSION:     1. Interval extubation.  2. Right IJ [place]-Dexter tip in the distal main/proximal right pulmonary  artery. Stable remaining devices.  3. Increased perihilar and bibasilar opacities, favored atelectasis  and/or pulmonary edema  4. small left pleural effusion.    I have personally reviewed the examination and initial interpretation  and I agree with the findings.    NIKOS JAVED MD         SYSTEM ID:  [id number]   XR Fluoro Time 0/1 Hour    Narrative    This exam was marked as non-reportable because it will not be read by a   radiologist or a [place] non-radiologist provider.         XR Chest Port 1 View    Narrative    XR CHEST PORT 1 VIEW  7/2/2022 2:25 AM      HISTORY: Post-Op Lung    COMPARISON: 7/1/2022    FINDINGS:   Single AP view of the chest. Postoperative change of bilateral lung  transplantation. Feeding tube courses beyond the field-of-view. Right  IJ central sheath tip in the high SVC with removal of  [place]-Dexter.  Bilateral chest tubes and mediastinal drain in similar position.  Stable mediastinum. Trace right apical pneumothorax. No appreciable  right pleural effusion. Small left pleural effusion. Similar perihilar  and bibasilar interstitial and airspace opacities. Enteric contrast in  the stomach.      Impression    IMPRESSION:   1. Interval removal of [place] with right IJ sheath tip in the high  SVC. Stable remaining devices.  2. Stable perihilar and bibasilar opacities with small left pleural  effusion.  3. Trace right apical pneumothorax with chest tubes in place.    I have personally reviewed the examination and initial interpretation  and I agree with the findings.    CECI REGAN DO         SYSTEM ID:  [id number]   US Upper Ext Arterial Duplex Left Port     Value    Radiologist flags Occlusion of the radial artery and distal ulnar (Urgent)    Narrative    Duplex Doppler ultrasound assessment of the left upper extremity  7/2/2022 10:44 AM    Clinical information: Had left radial arterial line removed 7/2,  worsening paresthesia and weakness    Ordering provider:    Technique: B-mode (grayscale) and duplex Doppler ultrasound of the  upper extremity arteries. Velocity measurements obtained with angle  correction at or less than 60 degrees.    Findings:     Peak systolic velocities:    Left Upper Extremity Arteries:    Subclavian: 132 cm/sec    Axillary artery: 126 cm/sec    Brachial proximal: 135 cm/sec   Brachial mid: 168 cm/sec  Brachial antecubital: 145 cm/sec    Occlusive thrombus throughout the visualized radial artery.    Ulnar artery origin: 170 cm/sec  Ulnar artery proximal: 86 cm/sec  Ulnar artery mid forearm: 68 cm/sec  Unable to detect Doppler flow within the ulnar artery at the level of  the wrist.      Impression    Impression:  1. Occlusive thrombus throughout the extent of the visualized radial  artery.  2. Diminished arterial flow within the distal ulnar artery. No Doppler  flow is  detected within the ulnar artery at the level of the wrist.  3. The subclavian, axillary and brachial arteries are patent with  normal Doppler flow.    [Urgent Result: Occlusion of the radial artery and distal ulnar  artery]    Finding was identified on 7/2/2022 12:18 PM.     Dr. Montelongo was contacted by Dr. Zepeda at 7/2/2022 12:37 PM and  verbalized understanding of the urgent finding.      I have personally reviewed the examination and initial interpretation  and I agree with the findings.    PAULINE PORRAS MD         SYSTEM ID:  [id number]   XR Chest Port 1 View    Narrative    XR CHEST PORT 1 VIEW  7/3/2022 1:39 AM      HISTORY: Post-Op Lung    COMPARISON: 7/2/2022    FINDINGS:   Single AP view of the chest. Postoperative changes of bilateral lung  transplantation. Feeding tube courses beyond the field-of-view. Stable  right IJ CVC sheath. Stable bilateral chest tubes and mediastinal  drain. Dense enteric contrast overlying the stomach. Stable  mediastinum. Slightly decreased conspicuity of a trace right apical  pneumothorax. No significant right pleural effusion. Possible trace  left pleural effusion. Decreased perihilar and bibasilar interstitial  and airspace opacities.      Impression    IMPRESSION:   1. Stable support devices.  2. Slightly decreased conspicuity of a trace right apical  pneumothorax.  3. Decreased perihilar and bibasilar opacities with possible trace  left pleural effusion.    I have personally reviewed the examination and initial interpretation  and I agree with the findings.    CECI REGAN DO         SYSTEM ID:  [id number]   XR Chest Port 1 View    Narrative    EXAM: XR CHEST PORT 1 VIEW  7/4/2022 1:03 AM     HISTORY:  Post-Op Lung       COMPARISON:  7/3/2022, 7/2/2022    FINDINGS  Technique: Semiupright AP view of the chest.     Devices: Right internal jugular approach central venous catheter  terminates over the mid SVC. Bilateral apical and basal chest tubes  are not appreciably  changed. Clamshell sternotomy wires.    Unchanged streaky perihilar and bibasilar pulmonary opacities. Cardiac  silhouette is stable in size. Aortic arch calcifications. Remaining  trace right apical pneumothorax. No pleural effusion.       Impression    IMPRESSION:     1. Trace remaining right apical pneumothorax.  2. Unchanged perihilar/bibasilar atelectasis and/or edema.  3. Support devices stable.    I have personally reviewed the examination and initial interpretation  and I agree with the findings.    ALVINA HARRY MD         SYSTEM ID:  [id number]   XR Chest Port 1 View    Narrative    EXAM: XR CHEST PORT 1 VIEW  7/5/2022 1:35 AM     HISTORY:  Post-Op Lung       COMPARISON:  7/4/2022    FINDINGS  Technique: Semiupright AP view of the chest.     Devices: Right internal jugular approach central venous catheter  terminates over the mid SVC. Bilateral apical and basal chest tubes  are not appreciably changed. Clamshell sternotomy wires.    Unchanged streaky perihilar and bibasilar pulmonary opacities. Cardiac  silhouette is stable in size. Aortic arch calcifications. Remaining  trace right apical pneumothorax. No pleural effusion.       Impression    IMPRESSION:   No significant interval change. Continued trace right apical  pneumothorax.    I have personally reviewed the examination and initial interpretation  and I agree with the findings.    GABRIELLA SNELL MD         SYSTEM ID:  [id number]   XR Chest Port 1 View    Narrative    EXAM: XR CHEST PORT 1 VIEW  7/6/2022 12:44 AM     HISTORY:  Post-Op Lung       COMPARISON:  7/5/2022    FINDINGS  Technique: Semiupright AP view of the chest.     Devices: Right internal jugular approach central venous catheter  terminates over the mid SVC. Bilateral apical and basal chest tubes  are not appreciably changed. Clamshell sternotomy wires.    Unchanged streaky perihilar and bibasilar pulmonary opacities. Cardiac  silhouette is stable in size. Aortic arch calcifications.  Remaining  trace right apical pneumothorax. No pleural effusion.       Impression    IMPRESSION:     1. Trace remaining right apical pneumothorax with bilateral chest  tubes in place.  2. Support devices stable.  3. Unchanged bilateral perihilar/basilar atelectasis/edema.    I have personally reviewed the examination and initial interpretation  and I agree with the findings.    GABRIELLA SNELL MD         SYSTEM ID:  [id number]   XR Chest Port 1 View    Narrative    Portable chest 7/6/2022 at 1306 hours    INDICATION: Post chest tube removal    COMPARISON: 0040 hours earlier today    FINDINGS: Interim bilateral thoracostomy tube removal. Clamshell  sternotomy from prior bilateral lung transplantation again present. No  conspicuous pneumothorax upon chest tube removal on either side. Right  basilar thoracostomy tube remains. Feeding tube beyond the inferior  margin of the image.      Impression    IMPRESSION: No conspicuous pneumothorax. Prior bilateral lung  transplant.    ALVINA HARRY MD         SYSTEM ID:  [id number]   XR Chest Port 1 View    Narrative    Exam: XR CHEST PORT 1 VIEW, 7/7/2022 5:47 AM    Indication: Post-Op Lung    Comparison: 7/6/2022    Findings:   Right IJ sheath over the SVC/innominate confluence. Feeding tube  courses below diaphragm with tip excluded from field-of-view.  Unchanged chest tubes and mediastinal drain.    Unchanged cardiac silhouette. Unchanged blunting of the left  hemidiaphragm. Unchanged small right hydropneumothorax. No new focal  pulmonary opacities.      Impression    Impression: Bilateral lung transplants with unchanged small right  hydropneumothorax.    I have personally reviewed the examination and initial interpretation  and I agree with the findings.    JOHANN MORAES MD         SYSTEM ID:  [id number]   CT Chest w/o Contrast    Narrative    CT chest without contrast    Indication evaluate fluid collection. Recent one transplant 6/28    COMPARISON: Most recent available  chest CT 11/11/2021    FINDINGS: Catheter from right side approach at the junction of the  right and left innominate veins at the upper margin of the SVC. Tubing  in the right axilla. Tube into the stomach progressing beyond the  inferior margin of the image. Trace right pleural effusion. Small left  pleural effusion. Cardiomegaly. No pericardial effusion. Small  mediastinal lymph nodes which may be reactive. Pericardial drain. Main  pulmonary artery mildly enlarged at 3.8 cm. Other tubing in the  abdomen which may be in a loop of bowel. Right thoracostomy tube.  Small amount of perihepatic ascites.  Bones show clamshell sternotomy from prior bilateral lung transplant.  Mild degenerative changes in the thoracic spine. There is contrast in  the stomach.  Right hydropneumothorax associated with the effusion. Chest wall  subcutaneous emphysema. Retrosternal air. There is some narrowing of  the left sided anastomosis. Multiple anterior chest wall subcutaneous  emphysema. Air collection right chest wall anteriorly associated with  the pleura on the right. Please correlate for any sign of  bronchopleural fistula formation. Loculated fluid and air in the left  major fissure. No dominant pulmonary nodule. Other small  hydropneumothorax on the left.      Impression    IMPRESSION: Bilateral small hydropneumothoraces. Prior bilateral lung  transplant. Right chest wall abutting the right pleura, please  correlate for any evidence of bronchopleural fistula formation versus  recent postsurgical air. Enlarged pulmonary artery. Multiple support  devices. Cardiomegaly.    ALVINA HARRY MD         SYSTEM ID:  [id number]   CT Head w/o Contrast    Narrative    CT HEAD W/O CONTRAST 7/7/2022 2:13 PM    History: rule out central process for impaired ventilation     Comparison: None available    Technique: Using multidetector thin collimation helical acquisition  technique, axial, coronal and sagittal CT images from the skull base  to  the vertex were obtained without intravenous contrast.    Findings: There is no intracranial hemorrhage, mass effect, or midline  shift. No acute loss of gray-white differentiation.. Ventricles are  proportionate to the cerebral sulci. The basal cisterns are patent.     The bony calvaria and the bones of the skull base are normal. The  visualized portions of the paranasal sinuses and mastoid air cells are  clear. Partially visualized nasogastric tube.      Impression    Impression:  No acute intracranial pathology.          I have personally reviewed the examination and initial interpretation  and I agree with the findings.    DICK AMAYA MD         SYSTEM ID:  [id number]   XR Chest Port 1 View    Narrative    EXAM: XR CHEST PORT 1 VIEW  7/8/2022 3:58 AM     HISTORY:  chest tubes and R hydropneumothorax s/p bilateral lung  transplant       COMPARISON:  7/7/2022 radiograph, CT    FINDINGS  Technique: Semiupright AP view of the chest.     Devices: Right internal jugular approach central venous catheter  terminates near the confluence of the innominate veins.. Bilateral  apical and basal chest tubes are not appreciably changed. Clamshell  sternotomy wires.    Stable perihilar and bibasilar pulmonary opacities. Cardiac silhouette  is stable in size. Aortic arch calcifications. Remaining trace right  apical pneumothorax. No pleural effusion.       Impression    IMPRESSION:     1. Unchanged small right hydropneumothorax.  2. Stable perihilar/bibasilar pulmonary opacities, atelectasis versus  edema.    I have personally reviewed the examination and initial interpretation  and I agree with the findings.    GABRIELLA SNELL MD         SYSTEM ID:  [id number]   XR Abdomen Port 1 View    Narrative    EXAM: XR ABDOMEN PORT 1 VIEWS, 7/8/2022 6:39 AM    INDICATION: abdominal pain, increased distension    COMPARISON: 7/1/2022 fluoroscopy, 6/30/2022 radiograph    FINDINGS  Technique: Supine AP view of the abdomen.      Devices:  Feeding tube terminates over the second portion of the  duodenum. Bibasilar chest tubes visualized.    Enteric contrast in the stomach. No dilated bowel or pneumatosis.      Impression    IMPRESSION:   No dilated bowel to suggest obstruction. Moderate colonic stool  burden.    I have personally reviewed the examination and initial interpretation  and I agree with the findings.    GABRIELLA SNELL MD         SYSTEM ID:  [id number]   US Upper Ext Arterial Duplex Left Port    Narrative    Exam: Duplex ultrasound of the right upper extremity 7/8/2022    Clinical information: Left upper extremity swelling status post left  radial thrombectomy. Pain and tenderness.     Comparison: 7/2/2022    Technique: Grayscale (B-mode), color Doppler, and duplex spectral  Doppler ultrasound of the lower extremity arteries. Velocity  measurements obtained with angle correction of 60 degrees or less.    Ordering provider: Dr Sesay    Findings:  Subclavian artery: 106 cm/s  Axillary artery: 120 cm/s  Brachial artery upper arm: 235 cm/s  Brachial artery mid: 218 cm/s  Brachial artery at the antecubital fossa: 153 cm/s  Radial artery origin: 135 cm/s  Radial artery midforearm: 115 cm/s  Radial artery wrist: 109 cm/s  Ulnar artery origin: 121 cm/s  Ulnar artery mid forearm: 161 cm/s  Ulnar artery wrist: 30, 40 cm/s    Waveforms: There is triphasic high resistance arterial flow through  the mid radial and ulnar arteries which become high resistance and  biphasic at the wrist arteries extending to the hand.      Impression    Impression:  Left radial artery is patent status post thrombectomy. Ulnar artery is  diminutive but patent at the wrist. Remainder of the left left upper  extremity arteries are patent.    I have personally reviewed the examination and initial interpretation  and I agree with the findings.    MARK SOSA         SYSTEM ID:  [id number]   US Upper Extremity Venous Duplex Left    Narrative    Exam: Duplex Doppler ultrasound  assessment of the upper extremities  veins bilaterally 7/8/2022 10:04 AM    Clinical information: LUE swelling post radial thrombectomy.    Ordering provider: Dr Sesay    Comparison:     Technique: B-mode (grayscale) and duplex Doppler ultrasound of the  upper extremity veins, including compressibility when feasible.  Velocity measurements obtained with angle correct at or less than 60  degrees.     Findings:     Left upper extremity:    Internal jugular vein: Thrombus: No, Phasic: Yes    Innominate vein: Thrombus: No, Phasic: Yes    Subclavian vein proximal: Thrombus: No, Phasic: Yes  Subclavian vein mid: Thrombus: No, Phasic: Yes    Axillary vein: Thrombus: No, Phasic: Yes    Brachial vein: Thrombus: No  Radial vein: Thrombus: No  Ulnar vein: Thrombus: No    Cephalic vein: Thrombus: No  Basilic vein: Thrombus: No    Left upper extremity veins demonstrate normal compressibility,  phasicity, and pulsatility.    Some subcutaneous edema over the wrist.      Impression    Impression:    Left upper extremity: No evidence of DVT    I have personally reviewed the examination and initial interpretation  and I agree with the findings.    MARK CHANELESTHA         SYSTEM ID:  [id number]   XR Chest Port 1 View    Narrative    EXAM: XR CHEST PORT 1 VIEW  7/9/2022 6:39 AM     HISTORY:  chest tubes and R hydropneumothorax s/p bilateral lung  transplant       COMPARISON:  7/8/2022    FINDINGS  Technique: Semiupright AP view of the chest.     Devices: Right internal jugular approach central venous catheter  terminates over the central right innominate vein. Bilateral chest  tubes unchanged. Clamshell sternotomy wires.    Stable perihilar and bibasilar pulmonary opacities. Cardiac silhouette  is stable in size. Aortic arch calcifications. Remaining small right  apical hydropneumothorax.      Impression    IMPRESSION:     1. Stable small right apical pneumothorax.  2. Stable perihilar/bibasilar pulmonary opacities, atelectasis  versus  edema.  3. Stable support devices.    I have personally reviewed the examination and initial interpretation  and I agree with the findings.    JOHANN MORAES MD         SYSTEM ID:  [id number]   XR Chest Port 1 View    Narrative    Chest one view portable spine    HISTORY: Chest tubes and right hydropneumothorax status post bilateral  lung transplant    COMPARISON STUDY: 7/9/2022    FINDINGS: Cardiac silhouette is nonenlarged. Surgical changes  bilateral lung transplant. Right IJ sheath. Feeding tube collimated  off film in the abdomen. Bilateral chest tubes and mediastinal drain.  Small pleural effusions tracking the apices. Left basilar atelectasis  and consolidation. Right axillary PICC. Tiny bibasilar pneumothoraces.      Impression    IMPRESSION: Tiny bibasilar pneumothoraces. Interstitial opacities  bilaterally compatible with pulmonary edema. Left pleural effusion  with basilar atelectasis and consolidation.    JOHANN MORAES MD         SYSTEM ID:  [id number]   XR Chest Port 1 View    Narrative    EXAM: XR CHEST PORT 1 VIEW  7/11/2022 5:40 AM     HISTORY:  chest tubes and R hydropneumothorax s/p bilateral lung  transplant       COMPARISON:  7/10/2022    FINDINGS: Single view of the chest. Postsurgical changes of the chest  with intact clam shell sternotomy wires. Enteric tube courses below  the diaphragm and beyond the field-of-view. Right IJ central venous  catheter tip projects over the SVC. Mediastinal drain and bibasilar  chest tubes.     Stable cardiomediastinal silhouette. Small bilateral pleural  effusions. Resolved right basilar hydropneumothorax. Improving left  basilar pneumothorax. Increased perihilar and bibasilar interstitial  and airspace opacities.       Impression    IMPRESSION:   1. Resolved right and improving left basilar pneumothorax.  2. Mildly increased perihilar and bibasilar opacities likely  representing pulmonary edema, atelectasis and/or infection.  3. Small pleural effusions,  similar to prior.    I have personally reviewed the examination and initial interpretation  and I agree with the findings.    GABRIELLA SNELL MD         SYSTEM ID:  [id number]   XR Chest Port 1 View    Narrative    Exam: XR CHEST PORT 1 VIEW, 7/12/2022 5:50 AM    Indication: chest tubes s/p bilateral lung transplant    Comparison: 7/11/2022    Findings:   Feeding tube is seen coursing through the mediastinum. Tip projects  off the film but is at least to the stomach. Mediastinal drain and  right basilar chest tube is unchanged. Left basilar chest tube in  similar position. Right IJ sheath tip at the confluence of the  innominate veins. Right arm midline in the axillary vein.     Increasing subpleural opacities at the apices. Continued perihilar and  lower lobe mixed opacities. Stable cardiomegaly.      Impression    Impression:   1. Increasing pleural effusions at the apices, possibly loculated.  2. Stable support devices.  3. Stable perihilar and lower lobe mixed opacities suggesting edema.    GABRIELLA SNELL MD         SYSTEM ID:  [id number]   XR Abdomen Port 1 View    Narrative    Exam: XR ABDOMEN PORT 1 VIEWS 7/12/2022 11:00 AM    Indication: Abdominal pain    Comparison: Radiograph 7/8/2022    Findings:   Two AP supine views of the abdomen. Partially visualized postsurgical  changes of bilateral left lung transplant with clamshell sternotomy  wires. Bilateral chest tubes and mediastinal drains in place.  Bilateral pleural effusions, left greater than right.    Enteric tube with tip projecting over the third portion of the  duodenum. Nonobstructive bowel gas pattern. Large stool burden in the  left splenic flexure and descending colon. No pneumatosis or portal  venous gas. Nonspecific new densities projecting over the pelvis,  likely intraluminal or external to the patient. No acute osseous  abnormalities.        Impression    Impression:   1. Nonobstructive bowel gas pattern without pneumatosis. Large stool  burden  in the left splenic flexure and descending colon.  2. The feeding tube tip projects over the third portion of the  duodenum.    I have personally reviewed the examination and initial interpretation  and I agree with the findings.    NADIR NOLEN MD         SYSTEM ID:  [id number]   XR Chest Port 1 View    Narrative    EXAM: XR CHEST PORT 1 VIEW  7/13/2022 6:00 AM     HISTORY:  chest tubes and R hydropneumothorax s/p bilateral lung  transplant       COMPARISON:  Chest x-ray 7/12/2022    FINDINGS: AP radiograph of the chest. Postoperative changes of  bilateral lung transplant with intact median clamshell sternotomy  wires. Right IJ sheath with tip at the innominate confluence. Right  arm midline catheter with tip overlying the right axilla. Stable  positioning of bibasilar chest tubes and mediastinal drain.    Stable cardiomediastinal silhouette. Atherosclerotic calcifications of  the aortic arch. Improved aeration of the left lung base. Small left  pleural effusion. No appreciable pneumothorax. Continued dense  retrocardiac and patchy left perihilar airspace opacities. Decreased  subpleural opacities at the lung apices.      Impression    IMPRESSION:   1. Improved aeration to the left lung base.  2. Decreased left pleural effusion.  3. Decreased subpleural apical opacities.  4. Unchanged retrocardiac and perihilar opacities, likely pulmonary  edema and/or atelectasis.    I have personally reviewed the examination and initial interpretation  and I agree with the findings.    YANNICK BENAVIDEZ MD         SYSTEM ID:  [id number]   XR Chest Port 1 View    Narrative    Exam: XR CHEST PORT 1 VIEW, 7/14/2022 5:44 AM    Indication: chest tubes and R hydropneumothorax s/p bilateral lung  transplant    Comparison: 7/13/2022    Findings:   Right IJ sheath at the confluence of the innominate veins. Feeding  tube seen coursing through the mediastinum. Tip projects off the film.  No change in the bibasilar chest tubes or mediastinal  drain. Intact  clamshell sternotomy wires.    Hazy opacity over both lower lobes suggesting effusion and  atelectasis. Mild perihilar haziness suggesting edema. Fullness of the  left hilum could represent atelectasis. Focal consolidative process  isn't excluded. Biapical pleural thickening.      Impression    Impression:   1. Stable support devices  2. Bilateral pleural effusions with associated atelectasis.  3. Stable edema.  4. Prominent left hilum could represent atelectasis or postop  hematoma. This is unchanged. Focal consolidative process from  pneumonia isn't excluded.    GABRIELLA SNELL MD         SYSTEM ID:  [id number]   CT Chest w/o Contrast    Narrative    Chest CT without contrast 7/14/2022 9:57 AM    Clinical information: 62 years Female Interval f/u on prior PTX and  loculated effusions, persistent hypercapnia    Comparison: Same day chest radiograph. Chest CT without contrast  7/7/2022.    FINDINGS:    LINES/TUBES: Bilateral lung transplant post surgical changes.  Bibasilar chest tubes in place. Partially visualized postpyloric  feeding tube. Right IJ sheath terminates in the high SVC. Pericardial  drain.    LUNG: Persistent 2.5 cm solid ovoid masslike lesion in the posterior  left upper lobe (series 4 image 65). No groundglass opacities  bilaterally. Scattered sub-3 mm pulmonary nodules bilaterally.    LARGE AIRWAYS: Patent tracheobronchial tree without intraluminal mass.    PLEURA: Unchanged bilateral small effusions. Unchanged biapical  pneumothoraces and resolved left basilar pneumothorax.    VESSELS: Normal configuration of the great vessels. Main pulmonary  artery measures 3.7 cm in diameter. Mild aortic calcifications.    HEART: Cardiomegaly. No pericardial effusion. Extensive coronary  artery atherosclerotic calcifications.    MEDIASTINUM AND JENNIFER: No suspicious lymphadenopathy.    CHEST WALL: Intact shell sternotomy wires. Retrosternal air. Anterior  chest wall subcutaneous emphysema, resolved  on the right and  persistent on the left.    LOWER NECK: Thyroid unremarkable.    UPPER ABDOMEN: Limited evaluation due to lack of contrast. Esophagus  appears unremarkable. Small amount of perihepatic ascites. Hyperdense  material within the stomach.    BONES: Mild thoracic spine degenerative changes. No acute osseous  abnormality. No suspicious bony lesions. Unremarkable soft tissues.      Impression    IMPRESSION:   1.  Unchanged biapical pneumothoraces, resolved left basilar  pneumothorax. Unchanged bilateral effusions.  2.  Pulmonary arterial hypertension suggested by main pulmonary artery  dilation.  3.  Small amount of perihepatic ascites.  4.  Support devices as above.    I have personally reviewed the examination and initial interpretation  and I agree with the findings.    JOHANN MORAES MD         SYSTEM ID:  [id number]   XR Chest/Heart Fluoro    Narrative    EXAM:  XR CHEST/HEART FLUORO 7/14/2022 2:59 PM    INDICATION: S/p lung transplant, evaluate diaphragm function    COMPARISON:  Same-day chest radiograph    FLUOROSCOPY TIME: 0.6 minutes    FINDINGS:  During normal breathing, there is lack of of movement of the right  hemidiaphragm compared to the left. During sniff test there is  paradoxical superior excursion of the right hemidiaphragm. There is  normal excursion of the left hemidiaphragm.    The remaining visualized lung bases and abdomen are fluoroscopically  unremarkable. Partially visualized enteric tube and right central  venous catheter projecting over the low SVC. Intact clamshell  sternotomy wires. Bilateral pleural chest tubes in place.      Impression    IMPRESSION:  Findings consistent with right hemidiaphragm palsy.    I have personally reviewed the examination and initial interpretation  and I agree with the findings.    YANNICK BENAVIDEZ MD         SYSTEM ID:  [id number]   XR Chest Port 1 View    Narrative    EXAM: XR CHEST PORT 1 VIEW  7/14/2022 12:58 PM    HISTORY: 62 years Female HD  line placement.     COMPARISON: Chest radiograph 7/14/2022 5:44 AM. Same-day chest CT  without contrast.    TECHNIQUE: Portable AP view of the chest. Frontal and lateral views of  the chest.    FINDINGS:   Stable chest tube positioning. Partially visualized feeding tube.  Right IJ central venous catheter is at the mid SVC. Intact clamshell  sternotomy wires. Surgical clips about the chest. Aortic arch  calcification.    Slightly widened mediastinum, similar to prior. Midline trachea.  Stable cardiac silhouette. Indistinct pulmonary vasculature.  Persistent left costophrenic haziness. Tiny left pneumothorax. Stable  lung volumes. Decreased right basilar hazy opacities and persistent  dense retrocardiac opacification. Unremarkable upper abdomen. No acute  or suspicious osseous abnormalities. Unremarkable soft tissues.      Impression    IMPRESSION:   1.  New right IJ CVC tip is at the mid SVC.  2.  Tiny left pneumothorax .  3.  Stable left pleural effusion. Decreased small right pleural  effusion.  4.  Persistent retrocardiac opacification likely representing  pulmonary edema and/or atelectasis.    I have personally reviewed the examination and initial interpretation  and I agree with the findings.    JOHANN MORAES MD         SYSTEM ID:  [id number]   XR Chest Port 1 View    Narrative    EXAM: XR CHEST PORT 1 VIEW  7/14/2022 6:17 PM     HISTORY:  CVC location       COMPARISON:  7/14/2022    FINDINGS  Technique: Semiupright AP view of the chest.     Devices: Right internal jugular catheter terminates over the low SVC.  Clamshell sternotomy wires. Bibasilar chest tubes. Mediastinal drain.  Feeding tube passes inferior to the field of view.    Bibasilar perihilar streaky opacities and left retrocardiac opacity.  Tiny left pneumothorax unchanged. Stable left pleural effusion. Trace  right pleural effusion.    Cardiac silhouette stable in size.       Impression    IMPRESSION:     1. Right central venous catheter terminates  over the low SVC.  Additional support devices stable.  2. Stable small/moderate left pleural effusion and trace right pleural  effusion.  3. Stable tiny left pneumothorax.  4. Stable left basilar atelectasis and bibasilar/perihilar atelectasis  versus edema.    I have personally reviewed the examination and initial interpretation  and I agree with the findings.    JOHANN MORAES MD         SYSTEM ID:  [id number]   XR Chest Port 1 View    Narrative    Exam: XR CHEST PORT 1 VIEW, 7/15/2022 6:08 AM    Indication: chest tubes and R hydropneumothorax s/p bilateral lung  transplant    Comparison: 7/14/2022    Findings:   Bibasilar chest tubes are in place. Mediastinal drain is in place.  Right IJ central line tip in the mid superior vena cava. Feeding tube  is seen coursing through the mediastinum tip projects off the film.    No interval change in the apparent loculated pleural effusion at the  left lung base. Small amount of fluid is seen in the fissure on the  right. Biapical pleural fluid is seen. Small left apical pneumothorax.  Heart is upper limits of normal in size. Pulmonary vasculature. By  predominantly interstitial opacities throughout both lungs      Impression    Impression:   1. Stable support devices.  2. Stable loculated left basilar pleural effusion.  3. Stable left apical hydropneumothorax.  4. Interstitial process throughout both lungs suggests edema.    GABRIELLA SNELL MD         SYSTEM ID:  [id number]   XR Abdomen Port 2 Views    Narrative    EXAMINATION:  XR ABDOMEN PORT 2 VIEWS 7/15/2022 6:13 AM     COMPARISON: Radiograph 7/12/2022.    HISTORY: 62F in ICU s/p bilateral lung transplant and unexplained  hypercarbia - developing acute abd pain/distention, concern for  fulminant c.diff    TECHNIQUE: Frontal upright and supine views of the abdomen.    FINDINGS: Portable supine and upright views of the abdomen. Enteric  tube tip projects over the second portion of the duodenum. Partially  imaged thoracic  support devices, better evaluated on same-day chest  radiograph. Nonobstructive bowel gas pattern. No pneumatosis, portal  venous gas, or free air. Distended stomach. Left pleural effusion and  overlying consolidative opacity.       Impression    IMPRESSION:   Nonobstructive bowel gas pattern with distended stomach, similar to  prior. No free air.    I have personally reviewed the examination and initial interpretation  and I agree with the findings.    GABRIELLA SNELL MD         SYSTEM ID:  [id number]   CT Abdomen Pelvis w/o Contrast    Narrative    EXAMINATION: CT ABDOMEN PELVIS W/O CONTRAST, 7/15/2022 11:31 AM    INDICATION: S/P bilateral lung transplant and C dif now with  increasing abdominal pain and concern for toxic megacolon    COMPARISON STUDY: CT AP 7/14/2022    TECHNIQUE: CT scan of the abdomen and pelvis was performed on  multidetector CT scanner using volumetric acquisition technique and  images were reconstructed in multiple planes with variable thickness  and reviewed on dedicated workstations.     CT scan radiation dose is optimized to minimum requisite dose using  automated dose modulation techniques.    FINDINGS:    Lower thorax: Bibasilar chest tube in place. Partially visualized  central venous catheter tip terminates in the lower SVC. Intact  clamshell sternotomy wires. Normal heart size. Moderate coronary  artery calcifications. Fluid in the pericardial recess.     Postoperative changes of bilateral lung transplant. Subtle groundglass  opacities in the right lower lobe. Right greater than left pleural  effusions, at least partially loculated. Small foci of gas within the  right pleural effusion. Trace pneumothorax along the middle lobe.  Small amount of pneumomediastinum, likely postoperative.    Liver: No mass. No intrahepatic biliary ductal dilation.    Biliary System: Moderately distended gallbladder. No cholelithiasis or  secondary evidence of cholecystitis. No extrahepatic biliary  ductal  dilation.    Pancreas: No mass or pancreatic ductal dilation.    Adrenal glands: No mass or nodules    Spleen: Normal.    Kidneys: Bilateral nonobstructing calyceal stones, measuring up to the  3 mm in the left lower lobe. No suspicious mass, obstructing calculus  or hydronephrosis.    Gastrointestinal tract : Enteric tube terminates in the third portion  of the duodenum. Stomach is distended. Rectal tube in place. Contrast  within the small bowel. Normal caliber small and large bowel.   Distention stomach with fluid and contrast.    Mesentery/peritoneum/retroperitoneum: Small volume ascites.    Lymph nodes: No significant lymphadenopathy.    Vasculature: Patent major abdominal vasculature.    Pelvis: Gas within the urinary bladder, likely due to recent  catheterization.   Dong catheter in place.      Osseous structures: No aggressive or acute osseous lesion.      Soft tissues: Within normal limits.      Impression    IMPRESSION:   1. Moderately distended stomach. Enteric tube in place terminating in  the duodenum. No evidence of obstruction or inflammatory changes in  the bowel.   2. Small right hydropneumothorax with trace amount of air along the  major fissure. Small left partially loculated pleural effusion.  Bilateral basilar chest tubes in place  3. Postoperative changes of bilateral lung transplant with small of  pneumomediastinum, likely post operative.  4. Moderately distended gallbladder without cholelithiasis or  associated inflammatory changes to suggest acute cholecystitis.  5. Small volume ascites.    I have personally reviewed the examination and initial interpretation  and I agree with the findings.    GABRIELLA SNELL MD         SYSTEM ID:  [id number]   XR Abdomen Port 1 View    Narrative    Abdomen one view portable 7/15/2022 at 1754 hours    INDICATION: Check NG tube placement    COMPARISON: CT earlier today. Plain film earlier today 0605 hours    FINDINGS: Feeding tube tip is in the distal  duodenum. NG tube tip and  sidehole projected in the stomach. Nonobstructive bowel gas pattern.  Clamshell sternotomy wires partially imaged. Left basilar thoracostomy  tube.      Impression    IMPRESSION: Feeding tube and NG tube as described.    ALVINA HARRY MD         SYSTEM ID:  [id number]   XR Chest 2 Views    Narrative    EXAM: XR CHEST 2 VW  7/16/2022 8:21 AM      HISTORY: Interval f/u s/p transplant    COMPARISON: 7/15/2022    FINDINGS: Two views of the chest. Postoperative changes of bilateral  lung transplantation. Stable bibasilar chest tubes. Right IJ central  venous catheter tip in the mid SVC. Feeding tube and enteric tube are  subdiaphragmatic. Interval removal of mediastinal drain.    Trachea is midline. Stable size of the cardiomediastinal silhouette.  No focal pulmonary opacity. Decreased pleural fluid in the left base.  Stable small amount of biapical pleural fluid/pleural thickening.  Slightly prominent interstitial markings. Perihilar and bibasilar  atelectasis. Small left apical pneumothorax. No appreciable  right-sided pneumothorax.      Impression    IMPRESSION:    1. Postoperative chest with interval removal of mediastinal drain in  stable position of bibasilar chest tubes. Remaining tubes and lines as  described above.  2. Decreased left basilar effusion.  3. Stable small left apical pneumothorax.  4. Mild pulmonary edema and atelectasis.    I have personally reviewed the examination and initial interpretation  and I agree with the findings.    YANNICK BENAVIDEZ MD         SYSTEM ID:  [id number]   XR Abdomen Port 1 View    Narrative    Exam: XR ABDOMEN PORT 1 VIEWS, 7/16/2022 9:44 AM    Indication: Check interval change in gastric distension, any signs of  obstruction    Comparison: Abdominal radiograph 7/15/2022.    Findings:   Supine portable abdominal radiograph. Nasogastric tube tip and  sidehole overlying the stomach. Feeding tube has been advanced with  tip present towards the DJ  flexure. Decreased gaseous distention of  the stomach with scattered contrast and debris in the lumen. No  significant gaseous distention of small or large bowel loops. No acute  osseous abnormalities.    Bilateral chest tubes in the lung bases partially visualized. Please  see separate chest radiographs.      Impression    Impression:   1.  Nasogastric tube tip and sidehole in the stomach.  2.  Feeding tube tip towards the DJ flexure.  3.  Decreased distention of the stomach with some residual contrast  material and debris in the lumen noted.    YANNICK BENAVIDEZ MD         SYSTEM ID:  [id number]   XR Abdomen Port 1 View    Narrative    EXAM: XR ABDOMEN PORT 1 VIEWS  7/16/2022 8:24 PM      HISTORY: worsening abdominal pain    COMPARISON: Same day abdominal x-ray    FINDINGS: Enteric tubing with tip overlying the third part of  duodenum. Nasogastric tube tip overlying the stomach. Partially  visualized thoracotomy wires. Bibasilar chest tubes.    Residual contrast within the stomach. Nonobstructive bowel gas  pattern. No free air within the abdomen. No pneumatosis. No portal  venous gas. No abnormal calcifications. No visualized masses.    Visualized portions of the lung demonstrate no focal airspace  opacities. Soft tissues and osseous structures are unremarkable.      Impression    IMPRESSION:   Residual contrast within the stomach. No radiographic findings to  explain patient's abdominal pain.    I have personally reviewed the examination and initial interpretation  and I agree with the findings.    ALVINA HARRY MD         SYSTEM ID:  [id number]   XR Chest Port 1 View    Narrative    Portable chest    INDICATION: Interval follow-up post transplant    COMPARISON: Yesterday    FINDINGS: Clamshell sternotomy for prior bilateral lung transplant.  Bibasilar thoracostomy tubes again present. No pneumothorax. Right IJ  catheter tip in the proximal SVC. Feeding tube and NG/OG tube  progressed beyond the inferior  margin of the image. Subsegmental  atelectasis in the left lung base. Heart size normal. Calcifications  at the aortic knob.      Impression    IMPRESSION: Prior bilateral lung transplantation with no significant  acute changes.    ALVINA HARRY MD         SYSTEM ID:  [id number]   NM Gastric Emptying    Narrative    EXAM:  NM GASTRIC EMPTYING, 7/20/2022 2:16 PM  HISTORY: Post lung transplant, concern for gastroparesis    TECHNIQUE: The patient ingested 2.3mCi mCi of Tc-99m sulfur colloid in  1 egg, 1 toast with jelly . Static images were acquired at  approximately 1 hour intervals out through 4 hours using a dual head  gamma camera in an anterior and posterior position. The calculation of  emptying was based on dual head imaging with geometric mean  calculations.  A Linear square fit was calculated to the emptying  curve to determine the amount of residual activity at each time point.    FINDINGS:   Percent retention at 60 min = 100%.  Percent retention at 120 min = 98%.  Percent retention at 180 min = 95%.  Percent retention at 240 min =95%.    T1/2 is greater than 240 minutes.      Impression    IMPRESSION: Delayed gastric emptying.    =====================  Reference Range:  Gastric emptying  - 30 minutes: <70% of retention (> 30% emptying) suggests abnormally     fast emptying.  - 60 minutes: <90% retention (>10% emptying) is normal; less than 30%     retention (>70% emptying) suggests abnormally rapid emptying.  - 90 minutes: <65% retention (> 35% emptying) is normal.  - 120 minutes: <60% retention (> 40% emptying) is normal.  - 180 minutes: <30% retention (> 70% emptying) is normal.  - 240 minutes: <10% retention (> 90% emptying) is normal.    Gastric emptying T-1/2:  Solid: The normal range is  minutes  Liquid only: Normal range is 10-45 minutes.  Liquid only-children: At 60 minutes, normal range is 44-58 % .  Liquid only-infants: At 60 minutes, normal range is 32-64 %.      I have personally  reviewed the examination and initial interpretation  and I agree with the findings.    MIGUELITO NORWOOD MD         SYSTEM ID:  [id number]   XR Chest Port 1 View    Narrative    EXAM: XR CHEST PORT 1 VIEW  7/18/2022 6:05 AM     HISTORY:  Interval f/u s/p transplant       COMPARISON:  Radiographs 7/17/2022, 7/16/2022. CT 7/14/2022.    TECHNIQUE: AP view the chest at 20 degrees    FINDINGS:   Devices, lines, tubes: Right internal jugular central venous catheter  tip projecting over the mid SVC. Feeding tube and enteric tube course  inferior to the left hemidiaphragm with haziness at the field-of-view.  Bibasilar chest tubes in stable positioning. Clamshell sternotomy  wires are intact.    Postoperative changes of bilateral lung transplantation. The trachea  is midline. The cardiomediastinal silhouette is stably enlarged. The  pulmonary vasculature is mildly indistinct. Similar biapical pleural  effusions. Trace residual left pleural effusion. No appreciable  pneumothorax. No acute osseous abnormality.        Impression    IMPRESSION:   1. Supportive devices in similar position.  2. No significant change in biapical pleural effusions.  3. Slightly improved pulmonary interstitial edema.  4. Trace residual left pleural effusion.    I have personally reviewed the examination and initial interpretation  and I agree with the findings.    GABRIELLA SNELL MD         SYSTEM ID:  [id number]   CT Chest w/o Contrast    Narrative    EXAMINATION: Chest CT  7/18/2022 8:34 PM    CLINICAL HISTORY: bilateral apical pleural effusions    COMPARISON: CT 7/14/2022.    TECHNIQUE: CT imaging obtained through the chest without contrast.  Axial, coronal, and sagittal reconstructions and axial MIP reformatted  images are reviewed.     FINDINGS:    Lines/tubes: Enteric tubes course into the stomach and beyond the  field-of-view. Bibasilar chest tubes. Right IJ central venous catheter  terminates in the mid SVC.    Mediastinum: The visualized thyroid  is unremarkable. The heart is  enlarged. Small pericardial effusion. Moderate coronary artery  calcifications. The ascending aorta and main pulmonary artery are  normal in caliber. No thoracic lymphadenopathy. Esophagus is  unremarkable.    Lungs: Post surgical changes of bilateral lung transplant. The central  tracheobronchial tree is clear. Prominent biapical pleural effusions,  right apical collection also contains air. Fluid and air is seen  extending into the fissures bilaterally. Loculated bibasilar pleural  effusions. Left collection contains air.     Stable masslike pleural-based lesion in the left upper lobe measuring  2.4 cm, likely rounded atelectasis. Decreased groundglass opacities of  the dependent lower lobes bilaterally. No new airspace consolidation.    Bones and soft tissues: Intact clam shell sternotomy wires. No  suspicious osseous lesion.    Upper Abdomen: Limited evaluation of the upper abdomen demonstrate  small volume ascites.      Impression    IMPRESSION:   1. Complex loculated hydropneumothorax have not changed appreciatively  in size. The pneumothorax has decreased in size anteriorly. The amount  of air in the hydropneumothoraces has shifted as above.   2. Small pneumothorax associated with the left chest tube. Right chest  tube there is along the diaphragm without pneumothorax. Neither of the  chest tubes appears to be in any of the loculated hydropneumothoraces.  3. Improved groundglass opacities.  4. Small volume ascites in the visualized upper abdomen.    I have personally reviewed the examination and initial interpretation  and I agree with the findings.    GABRIELLA SNELL MD         SYSTEM ID:  [id number]   XR Chest Port 1 View    Narrative    EXAM: XR CHEST PORT 1 VIEW  7/19/2022 6:09 AM    HISTORY: 62 years Female bilateral chest tubes with biapical pleural  effusions s/p bilateral lung transplant.     COMPARISON: Chest CT without contrast, chest radiograph 7/18/2022.    TECHNIQUE:  Portable AP view of the chest.    FINDINGS:   Enteric and feeding tubes courses below the field of view. Right IJ  CVC tip is at the high to mid SVC. Clamshell sternotomy wires appear  intact. Stable bibasilar chest tube positioning.    Midline trachea. Stable cardiomediastinal silhouette mitral annular  calcifications.. Distinct pulmonary vasculature. Small left  costophrenic haziness. Similar biapical small pleural effusions.  Stable lung volumes. No focal airspace opacities. Unremarkable upper  abdomen. No acute or suspicious osseous abnormalities. Unremarkable  soft tissues.      Impression    IMPRESSION:   1.  Stable small left greater than right hydropneumothoraces.  2.  Stable support devices.    I have personally reviewed the examination and initial interpretation  and I agree with the findings.    ALVINA HARRY MD         SYSTEM ID:  [id number]   XR Chest 2 Views    Narrative    EXAM: XR CHEST 2 VIEWS  7/20/2022 8:01 PM     HISTORY:  pulled left pleural tube       COMPARISON:  7/19/2022    FINDINGS  Technique: Upright PA and lateral views of the chest.    Devices: Left chest tube removed. Right basilar chest tube remains.  Clamshell sternotomy wires. Right internal jugular catheter terminates  over the high SVC. Gastric tube and feeding tube pass below the left  hemidiaphragm and inferior to the field of view.    Biapical pleural effusions. Persistent loculated basilar left pleural  collection, with associated atelectasis. Hazy masslike lesion in the  left upper lobe, better visualized on CT. Persistent right  hilar/basilar atelectasis. Cardiac silhouette is stable in size.        Impression    IMPRESSION:   1. Interval removal of left basilar chest tube. Persistent loculated  left basilar pleural collection/hydropneumothorax with associated  atelectasis.  2. Right basilar chest tube remains in place.  3. Stable biapical pleural effusions.    I have personally reviewed the examination and initial  interpretation  and I agree with the findings.    GEORGE ROGERS MD         SYSTEM ID:  [id number]   XR Chest Port 1 View    Narrative    Portable chest    INDICATION: Right basilar chest tube post lung transplant    COMPARISON: 7/20/2022    FINDINGS: Heart is upper normal size. Left costophrenic angle blunting  unchanged. Right basilar thoracostomy tube again present. Clamshell  sternotomy from prior bilateral lung transplant. Feeding tube and  NG/OG tube again present. Right IJ catheter tip in the SVC.      Impression    IMPRESSION: Continued left pleural effusion. Prior bilateral lung  transplant.    ALVINA HARRY MD         SYSTEM ID:  [id number]   XR Chest Port 1 View    Narrative    EXAM: XR CHEST PORT 1 VIEW  7/22/2022 10:46 AM    HISTORY: 62 years Female Right basilar chest tube s/p lung transplant.      COMPARISON: Chest radiograph 7/21/2022.    TECHNIQUE: Portable AP view of the chest.     FINDINGS:   Post surgical changes of bilateral lung transplant. Clamshell  sternotomy wires appear intact. Surgical clips overlie the  mediastinum. Right basilar chest tube in place. Interval removal of  right internal jugular central venous catheter. Larger diameter  enteric tube courses below the level of the diaphragm and out of the  field-of-view. Smaller diameter enteric tube also courses out of the  field of view; however, sidehole is now visible immediately below the  level the diaphragm.    Midline trachea. Stable cardiomediastinal silhouette with some  blurring of the left heart border and loss of the left  hemidiaphragmatic silhouette. Slightly increased left-sided pleural  effusion with fluid present in the left major fissure. No discernible  pneumothorax. Similar appearance of hazy perihilar and left basilar  opacities.      Impression    IMPRESSION:   1.  Slight increase in size of the left pleural effusion with fluid  tracking along the left major fissure.   2.  Hazy perihilar opacities, likely  atelectasis versus mild edema.  3.  Enteric tube sidehole projects immediately inferior to the  diaphragm. Consider advancement. Remainder of support devices appear  stable.    I have personally reviewed the examination and initial interpretation  and I agree with the findings.    ALVIAN HARRY MD         SYSTEM ID:  [id number]   XR Chest Port 1 View    Narrative    Exam:  Chest X-ray 7/23/2022 9:19 AM    History: Right basilar chest tube s/p lung transplant    Comparison: 7/22/2022    Findings:   Single portable AP view of the chest. Enteric tube side-port is near  the diaphragm. Feeding tube is subdiaphragmatic with its tip above the  field-of-view. Slightly advanced right basilar chest tube.  Unchanged bilateral pleural effusions. Stable left hilar and basilar  opacities. No pneumothorax.      Impression    Impression:   1.  Enteric tube side port is near the diaphragm. Recommend advancing.  2.  Stable bilateral pleural effusions and left basilar opacity.    KANDACE ROJAS MD         SYSTEM ID:  [id number]   CT Chest w/o Contrast    Narrative    EXAMINATION: Chest CT  7/23/2022 5:07 PM    CLINICAL HISTORY: follow up since chest tube removed    COMPARISON: Chest x-ray same day, CT chest 7/18/2022    TECHNIQUE: CT imaging obtained through the chest without contrast.  Axial, coronal, and sagittal reconstructions and axial MIP reformatted  images are reviewed.     FINDINGS:    Devices: Nasogastric and feeding tube tips course into the stomach  then project beyond the field-of-view.    Lower neck and axillae: No enlarged supraclavicular nodes are present.  No actionable nodule is present in the imaged portion of the thyroid  lobes. No axillary lymphadenopathy.    Heart: The heart is enlarged. Unchanged small pericardial fluid.    Mediastinum and sonia: No mediastinal mass is present. Prominent  mediastinal lymph nodes are present, likely reactive.     Vessels: Normal caliber of the aorta and main pulmonary  artery.  Scattered scattered atherosclerotic calcifications of the thoracic  aorta and coronary arteries.    Airways: The central tracheobronchial tree is clear.    Lungs: Stable postsurgical changes of bilateral lung transplant.  Intact clam shell sternotomy wires. Right basilar chest tube in place.  Similar-appearing loculated right-sided hydropneumothorax. No  significant change in the small loculated hydropneumothorax on the  right lung base, with air tracking along the fissure. Increased left  loculated hydropneumothorax in the apex and base with especially along  the mediastinum (series 5, image 27). No new focal consolidation.  Increased size of pleural-based lesion in the left upper lobe,,  measuring about 3 cm compared to 2.2 cm (series 3, image 14); apparent  increase in size possibly secondary to adjacent  atelectasis/consolidation.     Upper abdomen: Limited evaluation but small volume of ascites remains  in the visualized upper abdomen.     Bones: No acute or aggressive osseous abnormality.    Soft tissues: Small amount of subcutaneous emphysema along the right  chest wall.      Impression    IMPRESSION:     1. Increased loculated fluid in the left hemithorax with specks of air  density in the loculated fluid, especially increased loculation along  the  mediastinum, compared to CT 7/18/2022.  2. Similar appearing loculated right hydropneumothorax compared to  prior CT 7/18/2022 with minimal decrease in fluid component.  Right-sided chest tube appears to be outside the loculated  hydropneumothorax  3. Apparent increased size of pleural-based lesion in the left upper  lobe, possibly secondary to associated consolidation/atelectasis.  Recommend attention on follow-up.  4. Partially visualized ascites.  5. Mild subcutaneous emphysema along the right chest tube with minimal  subcutaneous emphysema along the tract of removed left chest tube.  6. Few prominent mediastinal lymph nodes likely reactive.  Consider  attention on follow-up.    I have personally reviewed the examination and initial interpretation  and I agree with the findings.    NIKOS JAVED MD         SYSTEM ID:  [id number]   XR Chest Port 1 View    Narrative    Exam:  Chest X-ray 7/24/2022 9:05 AM    History: Right basilar chest tube s/p lung transplant    Comparison: 7/23/2022    Findings:   Single portable AP view of the chest. Enteric tube side-port is near  the diaphragm. Feeding tube is subdiaphragmatic with its tip below the  field-of-view. Stable right basilar chest tube.  Unchanged bilateral pleural effusions with loculation in the left  lateral aspect. Stable perihilar mass. No pneumothorax.      Impression    Impression:   1. Enteric tube side port is near the diaphragm. Recommend advancing.  2. Stable bilateral pleural effusions and left hilar mass.    KANDACE ROJAS MD         SYSTEM ID:  [id number]   XR Chest Port 1 View    Narrative    EXAM: XR CHEST PORT 1 VIEW  7/25/2022 10:27 AM    HISTORY: 62 years Female Right basilar chest tube s/p lung transplant.      COMPARISON: Chest radiograph 7/24/2022, chest CT 7/23/2022.    TECHNIQUE: Portable AP view of the chest.     FINDINGS:   Postsurgical changes of bilateral lung transplant. Clamshell  sternotomy wires appear intact. Right basilar chest tube appears  stable. Two enteric tubes course below the level of the diaphragm and  below the field of view.    Midline trachea. Stable cardiomediastinal silhouette with silhouetting  of the left heart border. Similar appearance of right-sided pleural  effusion and moderate left sided pleural effusion with loculated left  lateral aspect. Increased left midlung linear opacity and  opacification of the left upper lung field. Similar appearance of  bilateral perihilar and left basilar hazy opacities.      Impression    IMPRESSION:   1.  Similar appearance of bilateral pleural effusions with left-sided  loculated lateral component.  2.  Increased left  mid lung opacities with upper lung field  opacification, which may represent loculated pleural fluid,  atelectasis, or infection.  3.  Bilateral perihilar and left basilar hazy opacities, likely  representing edema versus atelectasis.  4.  Postsurgical changes of bilateral lung transplant    I have personally reviewed the examination and initial interpretation  and I agree with the findings.    ALVINA HARRY MD         SYSTEM ID:  [id number]   CT Chest Tube with Cath Placement    Narrative    PROCEDURES 7/25/2022 2:59 PM:  1. CT guided left anterior apical chest tube placement.    CLINICAL HISTORY: left apical effusion. Left chest tube placement  requested.    COMPARISONS: CT 7/23/2022    ATTENDING RADIOLOGIST: NIYAH Bernard MD    I, KATHY BERNARD MD, attest that I was present in the procedure room for  the entire procedure.    MEDICATIONS: The patient was placed on continuous monitoring. Vital  signs were monitored by the Interventional Radiology nurse under the  Attending Physician's supervision. No intravenous sedation  administered. The patient remained stable throughout the procedure.    ATTENDING FACE-TO-FACE SEDATION TIME: No intravenous sedation  administered.    DOSE: 555 mGycm.    PROCEDURE: The patient understood the limitations, alternatives, and  risks of the procedure and requested the procedure be performed. Both  written and verbal consent were obtained.    The left anterior chest was prepped and draped in the usual sterile  fashion. 1% lidocaine without epinephrine was used for local  anesthesia.    Under CT guidance a 5 [place] [employer] centesis catheter  needle was advanced into the left apical pleural effusion from an  anterior approach. CT image documenting catheter needle placement was  saved in the patient's record.    Needle removed. Catheter removed over guidewire. Track dilated over  guidewire to 12 Australian size.    12 [place] [employer] Flexima APD non locking J-Tip  catheter  advanced over guidewire and placed as a left anterior apical chest  tube.    Guidewire removed. CT image documenting placement and position of the  left chest tube was saved in the patient's record.    100 mL aspirated and submitted for the requested laboratory studies.    2-0 nylon catheter retaining suture and sterile dressing applied.  Catheter to water seal chest drain. No immediate complication.      Impression    IMPRESSION:  1. 12 [place] [employer] Flexima APD non locking J-Tip catheter  placed as a left anterior apical chest tube under CT guidance. 100 mL  aspirated and submitted for the requested laboratory studies.    2. Catheter to water seal chest drainage. Catheter to 20 cm underwater  suction when the patient returns to her unit. Further chest tube  orders per patient's primary team or Pulmonology.    KATHY BERNARD MD         SYSTEM ID:  [id number]   XR Chest Port 1 View    Narrative    EXAM: XR CHEST PORT 1 VIEW  7/26/2022 7:51 AM    HISTORY: 62 years Female Right basilar chest tube s/p lung transplant  and now with left chest tube (7/25).     COMPARISON: Chest radiograph 7/25/22, chest CT 7/25/2022    TECHNIQUE: Portable AP view of the chest.     FINDINGS:   Postoperative changes of the lateral lung transplant. Clamshell  sternotomy wires appear intact. Two enteric tubes are seen coursing  below the level of the diaphragm and below the field of view. The  smaller diameter enteric tube side-port is near the level of the  diaphragm. Right basilar chest tube in place. Interval placement of a  left apical pigtail catheter.    Midline trachea. Stable cardiomediastinal silhouette. There is a  defined density overlying the left border of the cardiomediastinal  silhouette. Similar appearance of the right apical pleural fluid  collection. Significant decrease in the left apical pleural fluid  collection. Similar appearance of moderate left-sided basilar pleural  effusion with loculated left  lateral aspect. No discernible  pneumothorax. Similar appearance of the bilateral perihilar and left  basilar linear opacities.      Impression    IMPRESSION:   1.  Ongoing left-sided pleural effusion with significantly decreased  apical component and interval placement of a left apical pigtail  catheter. There is a defined density overlying the left mediastinal  border that likely represents loculated fluid collection or fluid in  the pericardial space.  2.  Similar appearance of right pleural effusion.  3.  Bilateral perihilar and left basilar opacities, likely  representing edema versus atelectasis.  4.  Enteric tube side-port is near the level of the diaphragm.  Recommend advancing..    I have personally reviewed the examination and initial interpretation  and I agree with the findings.    JOHANN MORAES MD         SYSTEM ID:  [id number]   XR Chest 2 Views    Narrative    Chest 2 views    INDICATION: Interval follow-up, lung transplant, bilateral chest tubes    COMPARISON: Yesterday    FINDINGS: Clamshell sternotomy from prior bilateral lung transplant  again noted. Chest tubes again noted. Heart size normal. Left  perihilar opacity unchanged. Thickening in the right minor fissure  increased. Feeding tube beyond the inferior margin of the image.  Calcification at the aortic knob. No new ectopic air collections.  Continued costophrenic angle blunting and haziness in the left lower  lung.      Impression    IMPRESSION: Continued left pleural effusion and associated  atelectasis. Continued prominent perihilar opacity on the left.  Increased small pleural effusion or atelectasis on the right. Prior  bilateral lung transplantation.    ALVINA HARRY MD         SYSTEM ID:  [id number]   XR Chest 2 Views    Narrative    EXAM: XR CHEST 2 VIEWS  7/28/2022 9:56 AM    HISTORY: 62 years Female interval follow up, lung transplant,  bilateral chest tubes.     COMPARISON: Chest radiograph 7/27/2022, chest CT 7/25/2022, chest  CT  from same day.    TECHNIQUE: Frontal and lateral views of the chest.    FINDINGS:   Exam is partially limited by patient rotation. Postsurgical changes of  bilateral lung transplant. Clamshell sternotomy appear intact. Right  basilar chest tube in place. Left apical pigtail catheter in place.  Interval removal of two enteric tubes.    Midline trachea. Atherosclerotic calcifications. Enlarged  cardiomediastinal silhouette. Similar appearance of right-sided  pleural effusion with slight increase in fluid tracking along the  right minor fissure and similar apical fluid component. Similar  appearance of left pleural effusion with fluid tracking along the left  major fissure and small apical component. Prominent left perihilar  opacity unchanged. Ongoing perihilar and hazy left basilar opacities.  Small loculated hydropneumothorax along the lateral aspect of the left  lung, seen previously on recent chest CT..      Impression    IMPRESSION:   1.  Similar appearance of bilateral pleural effusions with left apical  pigtail catheter in place.  2.  Loculated hydropneumothorax along the left lateral lung.  3.  Similar appearance of bilateral hazy perihilar and left basilar  opacities.    I have personally reviewed the examination and initial interpretation  and I agree with the findings.    JOHANN MORAES MD         SYSTEM ID:  [id number]   CT Chest w/o Contrast    Narrative    EXAMINATION: CT CHEST W/O CONTRAST, 7/28/2022 1:28 PM    TECHNIQUE:  Helical CT images from the thoracic inlet through the lung  bases were obtained without IV contrast.     COMPARISON: Same day chest x-ray, chest CT 7/23/2022    HISTORY: increased L pleural effusion/opacity    FINDINGS:    Chest:   Mediastinum: Unremarkable thyroid. Patent tracheobronchial tree.  Unremarkable esophagus. Small left anteroinferior hydropneumothorax,  increased from prior. Fluid within the left apex with scattered foci  of air with pigtail catheter. Air and fluid  between the right upper  and middle lobes continuous with small right hydropneumothorax,  similar to prior. Right basilar chest tube does not appear to be  continuous with any fluid collections. Cardiomegaly. Severe coronary  calcifications. Loculated left pericardial/mediastinal fluid. Thoracic  aorta caliber within normal limits. Thoracic aortic atherosclerosis  extending into the great vessels. Main pulmonary artery caliber within  normal limits. Mediastinal and hilar calcified lymph nodes. Enlarged  bilateral hilar lymph nodes, likely reactive..     Lungs:    Postoperative changes of bilateral lung transplant. No new focal  consolidative opacity.  Posterior left upper lobe subpleural consolidation measuring 2.0 x 1.1  cm, not significantly changed.    Abdomen: Examination of the upper abdomen is limited. Small amount of  ascites in the bilateral upper quadrants. Abdominal aortic  calcifications. G-tube. Radiodense material throughout the bowel.  Gallbladder sludge.    Bones: Clamshell sternotomy wires. Mild thoracic degenerative changes  with multilevel Schmorl nodes and endplate sclerosis.      Soft Tissues: No suspicious mass.      Impression    IMPRESSION:   Small left loculated anteroinferior hydropneumothorax, increased from  prior.  Stable loculated right hydropneumothorax (intermediate density fluid)  with right basilar chest tube that does not appear to be continuous  with any fluid collections.  Stable left upper lobe subpleural consolidation measuring up to 2.0  cm. Recommend close attention on follow-up, possibly infectious.  Enlarged bilateral hilar lymph nodes, likely reactive.    I have personally reviewed the examination and initial interpretation  and I agree with the findings.    JOHANN MORAES MD         SYSTEM ID:  [id number]   XR Chest 2 Views    Narrative    EXAM: XR CHEST 2 VIEWS  7/29/2022 10:49 AM    HISTORY: 62 years Female interval follow up, lung transplant,  bilateral chest tubes.      COMPARISON: Chest radiograph 7/28/2022, chest CT 7/20/2022.    TECHNIQUE: Frontal and lateral views of the chest.    FINDINGS:   Postoperative changes of bilateral lung transplant. Clamshell  sternotomy wires appear intact. Left apical and right basilar chest  tubes appear stable in alignment. Left upper extremity midline  catheter tip projects over the left axillary region.    Midline trachea. Atherosclerotic calcifications of the aortic knob.  Stable cardiomediastinal silhouette. Similar appearance of bilateral  pleural effusions with loculated and apical components. There is  increased lucency in the mid right lung, which correlates with a  loculated right hydropneumothorax seen on CT. There is decreased hazy  opacity overlying the right minor fissure compared to previous exam.  Similar appearance of the left perihilar opacity, correlating with  loculated pericardial/mediastinal fluid seen on recent chest CT.  Similar appearance of the left lateral hydropneumothorax. Slightly  improved perihilar and hazy left basilar opacities.      Impression    IMPRESSION:   1.  Similar appearance of bilateral loculated pleural effusions a few  of which also have air. Bilateral chest tubes in place with stable  position.  2.  Slightly improved bilateral hazy perihilar and left basilar  opacities.  3.  Stable cardiac silhouette, known pericardial and mediastinal fluid  collections.    I have personally reviewed the examination and initial interpretation  and I agree with the findings.    GABRIELLA SNELL MD         SYSTEM ID:  [id number]   XR Chest 2 Views    Narrative    Exam: XR CHEST 2 VIEWS, 7/30/2022 10:42 AM    Indication: interval follow up, lung transplant, bilateral chest tubes    Comparison: 7/29/2022    Findings:   Left apical chest tube. Right basilar chest tube. Biapical pleural  thickening. Unusual configuration of the mediastinum with focal  widening at the AP window. Fluid is seen at the left costophrenic  angle.  Intact clamshell sternotomy wires. There are components of the  loculated pleural effusions appear resolved. Except for the  compressive atelectasis. Lungs are clear.Heart within normal limits.  Pulmonary vasculature within normal limits.       Impression    Impression:   1. Stable bilateral chest tubes.  2. Except for compressive atelectasis the lungs are clear.  3. Known fluid collection near the AP window appears increased in  size. Some of this may be projectional.  4. Additional multiple loculated pleural fluid collections are stable.    GABRIELLA SNELL MD         SYSTEM ID:  [id number]   XR Chest 2 Views    Narrative    EXAM: XR CHEST 2 VIEWS 7/31/2022 9:11 AM    HISTORY: interval follow up, lung transplant, bilateral chest tubes.    COMPARISON: Previous day.    TECHNIQUE: Upright frontal and lateral views of the chest.    FINDINGS: Left apical chest tube in place. Right basilar chest tube in  place. Left-sided CVC with tip in the subclavian  vein. Postsurgical  changes of the chest with contrast sternotomy wires intact.  Trachea is midline. Cardiomediastinal silhouette is stable. No new  pulmonary opacities. Stable streaky perihilar pulmonary opacities.  Right hydropneumothorax with locule of air in the minor fissure.  Stable left pleural effusion. Left hydropneumothorax with  air along  the left lower zone loculated collection      Impression    IMPRESSION:     1. Loculated right hydropneumothorax with a locule of air in the minor  fissure. Right basilar chest tube in stable position.  2. Loculated left hydropneumothorax with  air along the left lower  zone loculated collection. Stable position of left apical chest tube.  3. Stable bilateral atelectasis.    I have personally reviewed the examination and initial interpretation  and I agree with the findings.    NIKOS JAVED MD         SYSTEM ID:  [id number]   XR Chest 2 Views    Narrative    EXAM:  XR CHEST 2 VIEWS    INDICATION: interval follow up, lung  transplant, bilateral chest tubes    COMPARISON:  Chest x-ray 7/31/2022    HISTORY:  History of end-stage COPD S/P bilateral lung transplant  6/20/2022. Lungs were noted to be slightly undersized.    FINDINGS:  PA and lateral views of the chest. Postsurgical chest with intact  clamshell thoracotomy wires. Right basilar and left apical chest  tubes, stable. Left-sided central venous catheter terminating over the  subclavian vein, stable.    Cardiomediastinal silhouette within normal limits. Post surgical  changes of bilateral lung transplant. Stable perihilar opacities.  Right hydropneumothorax with decreased size of air locule. Left  hydropneumothorax with decreased but persistent air locule and some  increased fluid component. Unremarkable upper abdomen. No acute bony  lesions.      Impression    IMPRESSION:  1.  Right loculated hydropneumothorax with decreased size of the right  air locule overlying the minor fissure.  2.  Slightly increased fluid component with stable air component of  the left loculated hydro-pneumothorax.    I have personally reviewed the examination and initial interpretation  and I agree with the findings.    ALVINA HARRY MD         SYSTEM ID:  [id number]   XR Chest 2 Views    Narrative    EXAM: XR CHEST 2 VIEWS 8/2/2022 1:55 PM    HISTORY: interval follow up, lung transplant, bilateral chest tubes.    COMPARISON: 8/1/2022.    TECHNIQUE: Upright frontal and lateral views of the chest.    FINDINGS: Stable left apical chest tube. Stable right basilar chest  tube. Post surgical changes of bilateral lung transplant with  clamshell sternotomy wires intact. Unchanged left upper extremity  vascular catheter.    Trachea is midline. Cardiac and mediastinal silhouette is stable.  Stable perihilar pulmonary opacities. Bilateral hydropneumothorax with  decreased air locule. Unchanged biapical opacity. Metallic density  projecting over the left upper quadrant on the frontal projection,  likely  external to the patient      Impression    IMPRESSION:     1. Bilateral loculated hydropneumothorax with decreased air component  compared to prior radiograph.  2. Relatively unchanged perihilar pulmonary opacities.    I have personally reviewed the examination and initial interpretation  and I agree with the findings.    NIKOS JAVED MD         SYSTEM ID:  [id number]   XR Chest 2 Views    Narrative    Chest 2 views    INDICATION: Interval follow-up lung transplant, bilateral chest tubes    COMPARISON: Yesterday    Findings: Clamshell sternotomy from prior bilateral lung transplant  again noted. Left pleural effusion with possible loculation component  unchanged. Atelectasis or mild edema in the right upper lung zones  with another atelectasis in the left upper lung unchanged. Left chest  catheter again present. Right basilar thoracostomy tube again present.  Loculated right hydropneumothorax unchanged.      Impression    IMPRESSION: No significant interval change with underlying bilateral  lung transplant.    ALVINA HARRY MD         SYSTEM ID:  [id number]   XR Chest 2 Views    Narrative    Exam:  Chest 2 view    History: 62-year-old female with hx of end stage COPD, status post  bilateral lung transplant June 2022, heart failure, hypertension,  history of hepatitis C, former methamphetamine use.    Indication: Interval follow up lung transplant, bilateral chest tubes  are    Comparison: Chest x-ray 8/3/2022.    Findings: PA and lateral views of the chest. The trachea is midline.  Left approach midline catheter PICC terminates in the high left  axilla. Postoperative changes of bilateral lung transplant with  surgical clips, and sternotomy wires from clamshell sternotomy. Stable  position of left chest tube with tip projecting in the superior  portion of the left hemithorax. Stable position of right chest tube  with tip and sidehole projecting in the right lower hemithorax.  Redemonstration of left pleural  effusion with evidence of loculation.  Unchanged loculated right hydropneumothorax. Patchy atelectasis of the  left upper lobe. The visualized bones and upper abdomen are  unremarkable      Impression    Impression:  1. No significant interval change of bilateral lung transplants. With  stable positioning of bilateral chest tubes and redemonstration of  bilateral loculated pleural effusions.  2. Continued left upper lobe patchy atelectasis not significantly  changed from prior.  3. Left axillary PICC terminates in the high left axilla.     I have personally reviewed the examination and initial interpretation  and I agree with the findings.    JOHANN MORAES MD         SYSTEM ID:  [id number]   XR Chest Port 1 View    Narrative    EXAM: XR Chest 1 view 8/4/2022 6:45 PM      HISTORY: CT removal.    COMPARISON: Same day radiograph of the chest at 933.     TECHNIQUE: Frontal view of the chest.    FINDINGS: Left axillary CVC. Right basilar chest tube in stable  position. Interval removal of left apical chest tube. Trachea is  midline. Cardiac mediastinal silhouette is stable. Stable left upper  lobe patchy atelectasis. Stable moderate loculated left pleural  effusion. No definite left pneumothorax. Stable right loculated small  hydropneumothorax.      Impression    IMPRESSION:   1. Interval removal of left apical chest tube.  2. Stable moderate left loculated pleural effusion.  3. Simple right loculated small hydropneumothorax.  4. Stable left upper lobe patchy consolidation/atelectasis. Better  evaluated on CT of 7/28/2022    I have personally reviewed the examination and initial interpretation  and I agree with the findings.    GABRIELLA SNELL MD         SYSTEM ID:  [id number]   XR Chest 2 Views    Narrative    PA and lateral chest    HISTORY: Lung transplant follow-up chest tubes    Present study: 8/4/2022    FINDINGS: Surgical changes bilateral lung transplant. Left axillary  PICC. Loculated pleural effusions on the left.  Right basilar chest  tube. Small loculated effusions on the right.      Impression    IMPRESSION: No significant change in bilateral loculated pleural  effusions.    JOHANN MORAES MD         SYSTEM ID:  [id number]   XR Chest 2 Views    Narrative    EXAMINATION: XR CHEST 2 VIEWS, 8/6/2022 1:17 PM    COMPARISON: 8/5/2022    HISTORY: interval follow up, lung transplant, bilateral chest tubes    FINDINGS: Small left pleural effusion. Heart size is normal. Small  right pleural effusion is predominately seen overlying the apex. No  new airspace opacities. Lung transplant changes.      Impression    IMPRESSION: Bilateral lung transplant with bilateral small pleural  effusions, not substantially changed.      GEORGE ROGERS MD         SYSTEM ID:  [id number]   XR Chest 2 Views    Narrative    Exam: XR CHEST 2 VIEWS, 8/8/2022 10:30 AM    Indication: interval follow up, lung transplant, bilateral chest tubes    Comparison: 8/6/2022    Findings:   Left upper extremity vascular catheter tip projects over the left  subclavian vein. Surgical changes of bilateral lung transplantation  with clamshell sternotomy wires and mediastinal surgical clips. Stable  bilateral loculated pleural effusions. No appreciable pneumothorax.  Stable streaky perihilar opacities.      Impression    Impression: Surgical changes of bilateral lung transplantation with  stable bilateral loculated pleural effusions.    CECI REGAN DO         SYSTEM ID:  [id number]   XR Abdomen 2 Views    Narrative    EXAMINATION:  XR ABDOMEN 2 VIEWS 8/8/2022 4:47 PM     COMPARISON: Gastrojejunostomy tube placement images 7/27/2022,  radiographs 7/16/2022, CT 7/15/2022.    HISTORY: Abd pain worse when feeds increased-- Want to rule out ulcer  perforation vs ileus    TECHNIQUE: Frontal upright and supine views of the abdomen.    FINDINGS: Gastrojejunostomy bulb projects over the stomach, tube tip  projects over the jejunum. No evidence of peritoneal free air. No  abnormally  dilated loops of bowel. No pneumatosis or portal venous  gas.    Changes of bilateral lung transplant. Bilateral loculated pleural  effusions and atelectasis.      Impression    IMPRESSION: No radiographic evidence of peritoneal free air. No  abnormally dilated loops of bowel.    I have personally reviewed the examination and initial interpretation  and I agree with the findings.    GABRIELLA SNELL MD         SYSTEM ID:  [id number]   US Abdomen Limited w Abd/Pelv Duplex Complete Port     Value    Radiologist flags (Urgent)     New common bile duct dilatation with potential mass    Narrative    EXAMINATION: US ABDOMEN LIMITED WITH DOPPLER COMPLETE PORTABLE  8/8/2022 5:56 PM     COMPARISON: CT chest 7/28/2022    HISTORY: New abdominal pain in setting of change in tube feeds but  with rising LFTs concerning for cholangitis versus hepatitis.    TECHNIQUE: The abdomen was scanned in standard fashion with  specialized ultrasound transducer(s) using both gray-scale, color  Doppler, and spectral flow techniques.    Findings:  Liver: The liver demonstrates normal homogeneous echotexture measuring  12.5 cm in length. No evidence of a focal hepatic mass.     Extrahepatic portal vein flow is antegrade at 24 cm/s.  Right portal vein flow is antegrade, measuring 26 cm/s.  Left portal vein flow is antegrade, measuring 33 cm/s.    Flow in the hepatic artery is towards the liver and:  94 cm/s peak systolic  0.65 resistive index.     The splenic vein is patent and flow is towards the liver.  The left,  middle, and right hepatic veins are patent with flow towards the IVC.  The IVC is patent with flow towards the heart.   The visualized aorta  is not dilated.    Gallbladder: Layering sludge in the gallbladder with borderline wall  thickening measuring up to 4 mm. No pericholecystic fluid, positive  sonographic Sánchez's sign or evidence for cholelithiasis    Bile Ducts: Intrahepatic biliary dilation with an echogenic  extrahepatic mass at  the level of the common bile duct measuring 3.7 x  2.8 x 1.8 cm. The common bile duct measures 11 mm.    Pancreas: Visualized portions of the head and body of the pancreas are  unremarkable.     Kidneys: Both kidneys are of normal echotexture, without mass or  hydronephrosis.   Renal lengths: right- 8.1 cm.    Fluid: Small right pleural effusion.      Impression    Impression:   1.  Extrahepatic mass at the level of the common bile duct with  associated intrahepatic and common bile duct dilation measuring 3.7 x  2.8 x 1.8 cm. Differential includes malignancy including  cholangiocarcinoma. Prior PET/CT from July of last year did not show  any lesions in this area. It did however show a duodenal diverticulum.  Recommend follow-up abdominal liver MRI with MRCP or pancreatic mass  CT for further evaluation.  2.  Patent hepatic vasculature without sonographic evidence to suggest  hemodynamically significant stenosis.  3.  Layering gallbladder sludge without additional evidence to suggest  acute cholecystitis.  4.  Small right pleural effusion.    [Access Center: New common bile duct dilatation with potential mass  near the pancreas. MR of the abdomen with MRCP versus CT with  pancreatic mass protocol recommended.]    This report will be copied to the [facility] to ensure a  provider acknowledges the finding. Access Center is available Monday  through Friday 8am-3:30 pm.     I have personally reviewed the examination and initial interpretation  and I agree with the findings.    GABRIELLA SNELL MD         SYSTEM ID:  [id number]   MR Abdomen MRCP w/o & w Contrast    Narrative    Exam: MR ABDOMEN MRCP W/O & W CONTRAST, 8/10/2022 7:59 AM    Indication: Extra-hepatic mass seen on US    Comparison: 8/8/2022, 7/15/2010    Technique: Images were acquired with and without intravenous  gadolinium contrast through the upper abdomen. The following MR images  were acquired without intravenous contrast: TrueFISP,  multiplanar  T2-weighted, axial T1 in/out of phase, T2-weighted MRCP images, axial  diffusion-weighted and axial apparent diffusion coefficient.  T1-weighted images were obtained before contrast at the multiple time  points following contrast injection. 3-D reformatted images were  generated by the technologist. Contrast dose: 7.5mL Gadavist    FINDINGS:    Liver: Normal    Gallbladder/Bile Ducts: Slightly increased mild intrahepatic and  moderate extrahepatic biliary dilation with common hepatic duct  measuring up to 21 mm (series 4, image 9) and the common bile duct  measuring up to 11 mm with level of transition at the gallbladder neck  which bulges medially into the duct. The gallbladder is hydropic and  contains moderate amount of T1 hyperintense material, which extends  into the bladder neck with folding of the gallbladder wall at the  transition between the neck and body. No filling defect within the  intra or extrahepatic biliary tree.    Spleen: Normal    Kidneys: Subcentimeter right renal cortical cysts.    Adrenal glands: Normal    Pancreas: No atrophy. Nondilated pancreatic duct.  Cystic foci in the pancreatic head measuring 4 x 3 mm superiorly and 4  x 3 mm inferiorly (series 15, image 48 and 50 respectively).    Bowel: Nondilated.  Percutaneous gastrojejunostomy tube with tip in the proximal jejunum  in the right mid abdomen.    Lymph nodes: No adenopathy    Blood vessels: Patent portal, splenic and superior mesenteric veins  without thrombus, Conventional hepatic arterial anatomy    Lung bases: Loculated bilateral pleural effusions. Basilar opacities.    Bones and soft tissues: No acute osseous abnormality    Mesentery and abdominal wall: No hernia    Ascites: Small volume      Impression    IMPRESSION:   1.  Slightly increased moderate extrahepatic and mild intrahepatic  biliary dilation with the common hepatic duct measuring up to 2.1 cm  and the common bile duct measuring up to 1.1 cm in diameter  with  transition in the upper common bile duct at the level of the hydropic  gallbladder, which bulges medially. The gallbladder contains a  moderate amount of proteinaceous/hemorrhagic material, which extends  into the gallbladder neck, corresponding to the findings on comparison  ultrasound. Consider HIDA scan as clinically warranted to assess for  acute cholecystitis. There is no solid mass as clinically queried.  2.  Loculated bilateral pleural effusions.  3.  Small volume ascites.  4.  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 Recommend follow-up as described below.    [facility] recommendations for asymptomatic pancreatic  cysts, modified from international consensus guidelines*   Size of largest cyst:   Less than 1 cm: Follow-up imaging in 6 months to 1 year, then lengthen  interval to 2-3 years if no change.  1-2 cm: Follow-up imaging at 6 months, then yearly for 2 years, then  lengthen interval if no change.   The optimal initial study or first follow-up exam is MRI/MRCP  performed with intravenous gadolinium contrast to allow full cyst  characterization. Once characterized, contrast is optional on  follow-up MRIs. If MRI is contraindicated, CT with contrast is  recommended.   GI consultation for possible endoscopic ultrasound is recommended for  cysts with the following features: Size > 2 cm, >20% growth on  followup, wall thickening or enhancement, mural nodule, duct > 5 mm,  or abrupt change in duct caliber with distal atrophy. GI or surgical  consultation is also recommended for symptomatic cysts.   *Reference: International Consensus Guidelines for Management of IPMN  and MCN of the pancreas. Pancreatology: 12:(2012); 183-197.    I have personally reviewed the examination and initial interpretation  and I agree with the findings.    CECI REGAN,          SYSTEM ID:  [id number]   XR Chest 2 Views    Narrative    Chest 2 views    Indication interval  follow-up, lung transplant comment bilateral chest  tubes    COMPARISON: Yesterday    FINDINGS: Clamshell sternotomy from prior bilateral lung transplant  again noted. Calcification of the aortic knob. Heart size normal.  Bilateral pleural effusions unchanged with possible loculated  component especially on the left. No ectopic air collections in the  interim. Perihilar streaky opacities unchanged.      Impression    IMPRESSION: Postoperative bilateral lung transplant again noted with  continued pleural effusions and perihilar streaky prominence which  could indicate atelectasis or edema. No acute interval changes.    ALVINA HARRY MD         SYSTEM ID:  [id number]   XR Chest 2 Views    Narrative    PA and lateral chest    HISTORY: Interval follow-up lung transplant pleural effusions    COMPARISON STUDY: 8/9/2022    FINDINGS: Cardiac silhouette is not enlarged. Bilateral loculated  pleural effusions tracking into the apices, bilateral fissures and  left lower pleural space is again noted. Left axillary PICC. Clamshell  sternotomy wires from bilateral transplants.      Impression    IMPRESSION: No significant change in loculated pleural effusions  bilaterally    JOHANN MORAES MD         SYSTEM ID:  [id number]   XR Surgery LARISA Fluoro Less Than 5 Min w Stills    Narrative    This exam was marked as non-reportable because it will not be read by a   radiologist or a [place] non-radiologist provider.         CTA Abdomen Pelvis with Contrast    Narrative    EXAMINATION: CTA ABDOMEN PELVIS WITH CONTRAST, 8/11/2022 8:44 PM    TECHNIQUE:  Helical CT images from the lung bases through the pubic  symphysis were obtained  with IV contrast.     COMPARISON: Body MR 8/9/2022. Abdominal ultrasound 8/8/2022    HISTORY: hemobilia found on ERCP with stent placement    FINDINGS:  Lung bases:  Similar appearance of bilateral loculated moderate pleural effusions  with adjacent compressive atelectasis and lower lobe  predominant  interlobular septal thickening suggestive of pulmonary edema.    Abdomen and pelvis:   There is unremarkable. No focal enhancing lesion. Peripheral  ill-defined areas of enhancement likely secondary to transient hepatic  attenuation differences. No intra or extrahepatic biliary duct  dilation. Spleen size is within normal limits. Postsurgical  cholecystectomy changes. Common bile duct stent and pancreatic duct  stents with small volume pneumobilia. The main pancreatic duct is not  dilated. Homogenous enhancement of the pancreas.    Adrenals are within normal limits. Symmetric renal enhancement. No  hydronephrosis, calcified stone, contour deforming mass. Bladder is  distended and normal in appearance. Bladder is unremarkable.    Percutaneous jejunostomy tube with tip terminating in the proximal  jejunum. No small or large bowel wall thickening or dilation. The  appendix is not well-visualized, however there are no significant  inflammatory changes in the right lower quadrant. No free air. No  pneumatosis or portal venous gas. Small volume ascites.    Abdominal aorta measure branches are normal in caliber and patent with  moderate predominantly aortoiliac calcifications.    No mesenteric, retroperitoneal, or pelvic lymphadenopathy.    Bones and soft tissues: No suspicious or acute osseous lesion. Soft  tissues are unremarkable.    1.      Impression    IMPRESSION:   2.  Interval placement of common bile and pancreatic duct stents with  small volume pneumobilia. No evidence for acute GI bleed. Contrast  visualized in the distal small bowel from same day ERCP.  3.  No acute findings in the abdomen or pelvis. Small volume free  pelvic fluid.  4.  Similar appearance of bilateral loculated moderate pleural  effusions with adjacent compressive atelectasis and interlobular  septal thickening suggestive for pulmonary edema.    I have personally reviewed the examination and initial interpretation  and I agree  with the findings.    JOHANN MORAES MD         SYSTEM ID:  [id number]   XR Chest Port 1 View    Narrative    Exam:  Chest 1 view    History: Heart failure and COPD, status post bilateral lung transplant  6/20/2022 complicated by acute limb ischemia.    Indication: Evaluation postthoracentesis.    Comparison: Chest x-ray 8/11/2022.    Findings: AP portable semiupright radiograph of the chest. Stable  postsurgical changes of bilateral lung transplant with visible  surgical clips and intact clamshell sternotomy wires. Left approach  PICC line with tip terminating in the left axilla.    Cardiomediastinal silhouette size is stable. Unchanged left loculated  pleural effusion with fluid tracking into the left lung apices,  fissure, and lower pleural space. Grossly unchanged appearance of  right loculated pleural effusion status post thoracentesis, fluid  continues in the right horizontal fissure and tracking up to the lung  apices. There is rightward deviation of the thoracic trachea similar  as seen on prior radiograph. Visualized bones and upper abdomen are  unremarkable. Consolidation of the left lower lobe.      Impression    Impression:    1. Stable bilateral pleural effusions. Unchanged loculated left  pleural effusion   2. Right loculated pleural effusion not significantly changed in size  status post right thoracentesis.   3.  Left lung base consolidation, and large left pleural effusion.    I have personally reviewed the examination and initial interpretation  and I agree with the findings.    JOHANN MORAES MD         SYSTEM ID:  [id number]   XR Chest 2 Views    Narrative    PA and lateral chest    HISTORY: Follow-up lung transplant    COMPARISON STUDY: 8/12/2022    FINDINGS: Cardiac silhouette is nonenlarged. Bilateral loculated  pleural effusions left greater than right unchanged. Left axillary  PICC      Impression    IMPRESSION: No significant change in loculated bilateral pleural  effusions left greater than  right.    JOHANN MORAES MD         SYSTEM ID:  [id number]   XR Chest 2 Views    Narrative    Exam: XR CHEST 2 VIEWS, 8/15/2022 9:04 AM    Indication: H/o bilateral lung transplant, small bilateral effusions    Comparison: 8/13/2022    Findings:   Left upper extremity PICC tip projects over the axilla. Surgical  changes of bilateral lung transplantation with clamshell sternotomy  wires and mediastinal surgical clips. Stable cardiomediastinal  silhouette with prominence of the central pulmonary arteries. Stable  small to moderate bilateral loculated pleural effusions. No  pneumothorax. Stable streaky perihilar and medial bibasilar opacities.  No new focal airspace opacity.      Impression    Impression: Stable bilateral loculated pleural effusions. No new focal  airspace opacity.    CECI REGAN DO         SYSTEM ID:  [id number]   US Lower Extremity Venous Duplex Bilateral    Narrative    ULTRASOUND LOWER EXTREMITY VENOUS DUPLEX BILATERAL 8/15/2022 1:08 PM    CLINICAL HISTORY: Evaluate for deep venous thrombosis evaluation.      COMPARISONS: None available.    REFERRING PROVIDER: VALERIE DOWELL STEPHEN    TECHNIQUE: Grayscale, color Doppler, Doppler waveform ultrasound  evaluation was performed through the bilateral common femoral,  femoral, popliteal, and posterior tibial veins.     FINDINGS: Bilateral common femoral, femoral, popliteal, and posterior  tibial veins are fully compressible, patent, and demonstrate normal  phasic Doppler waveforms.      Impression    IMPRESSION: No deep venous thrombosis demonstrated in either leg.    KATHY BERNARD MD         SYSTEM ID:  [id number]   US Upper Extremity Venous Duplex Bilat    Narrative    ULTRASOUND UPPER EXTREMITY VENOUS DUPLEX BILATERAL  8/15/2022 1:11 PM    CLINICAL HISTORY: dvt eval pulm tx.     COMPARISONS: None available.    REFERRING PROVIDER: VALERIE DOWELL    TECHNIQUE: Bilateral internal jugular veins were evaluated with  grayscale, color Doppler, and  spectral pulsed wave Doppler ultrasound.  Bilateral innominate, subclavian, and axillary veins were evaluated  with color Doppler, and spectral pulsed wave Doppler ultrasound.  Bilateral brachial, basilic, and cephalic veins were evaluated with  grayscale imaging and compression.    FINDINGS: Right internal jugular vein is patent and fully compressible  with a phasic waveform.    Right innominate, subclavian, and axillary veins fill [id number] in  color Doppler images with phasic waveforms.    Right brachial, basilic, and cephalic veins are patent and fully  compressible.    Left internal jugular vein is patent and fully compressible with a  phasic waveform.    Left innominate, subclavian, and axillary veins fill [id number] in  color Doppler images with phasic waveforms.    PICC appears to be in the left medial brachial vein. Left medial  brachial vein is patent and fully compressible around the PICC. Left  lateral brachial vein is patent and fully compressible.    Left basilic and cephalic veins are patent and fully compressible.      Impression    IMPRESSION:  1. Left medial brachial vein is patent and fully compressible around  the PICC.    2. No deep or superficial venous thrombosis demonstrated in either  arm.    KATHY BERNARD MD         SYSTEM ID:  [id number]   US Upper Extremity Arterial Duplex Left    Narrative    ULTRASOUND UPPER EXTREMITY ARTERIAL DUPLEX LEFT 8/15/2022 1:03 PM    CLINICAL HISTORY: Left radial artery thrombectomy 7/2/2022.  Reevaluate.    COMPARISONS: Ultrasound upper extremity arterial duplex left 7/8/2022    REFERRING PROVIDER: VALERIE DOWELL STEPHEN    TECHNIQUE: Left arm arteries evaluated with color Doppler and spectral  pulsed wave Doppler ultrasound.    FINDINGS: LEFT:  Brachial artery, proximal: 144/0 cm/s, triphasic  Brachial artery, antecubital fossa: 122/0 cm/s, triphasic    Radial artery, origin: 118/0 cm/s, triphasic  Radial artery, wrist: 105/0 cm/s, triphasic    Ulnar  artery, origin: 100/0 cm/s, triphasic  Ulnar artery, wrist: 53/0 cm/s, triphasic      Impression    IMPRESSION: Dopplerable flow in the left radial and ulnar arteries at  the wrist.    KATHY BERNARD MD         SYSTEM ID:  [id number]   Echo Complete     Value    LVEF  55-60%    Narrative    [id number]  [id number]  [id number]  [id number]^PAULINO^PURNIMA^RAJ     [facility],[place]  Echocardiography Laboratory  [address] [place] [id number]     Name: ALMAS CASIANO  MRN: [id number]  : 1960  Study Date: 2022 09:30 AM  Age: 62 yrs  Gender: Female  Patient [facility]  Reason For Study: Shock  Ordering Physician: PURNIMA BOB  Performed By: Amalia Casas     BSA: 1.8 m2  Height: 62 in  Weight: 165 lb  ______________________________________________________________________________  Procedure  Complete Portable Echo Adult.  ______________________________________________________________________________  Interpretation Summary  Global and regional left ventricular function is normal with an EF of 55-60%.  Right ventricular function appears normal on limited views.  No hemodynamically significant valve abnormalities.  Unable to visualize IVC.  No pericardial effusion.  No significant changes in ventricular function compared to study from 21.  ______________________________________________________________________________  Left Ventricle  Global and regional left ventricular function is normal with an EF of 55-60%.  Left ventricular wall thickness is normal. Left ventricular size is normal.  Left ventricular diastolic function is normal. No regional wall motion  abnormalities are seen.     Right Ventricle  Right ventricular function appears normal on limited views.     Atria  Both atria appear normal.     Mitral Valve  Mild mitral annular calcification is present. Trace mitral insufficiency is  present.     Aortic Valve  Aortic valve is normal in structure and function. The aortic valve  is  tricuspid.     Tricuspid Valve  The tricuspid valve is normal. The peak velocity of the tricuspid regurgitant  jet is not obtainable. Pulmonary artery systolic pressure cannot be assessed.     Pulmonic Valve  The valve leaflets are not well visualized. On Doppler interrogation, there is  no significant stenosis or regurgitation.     Vessels  Normal diameter aortic root and proximal ascending aorta. The inferior vena  cava cannot be assessed. Atheroma noted in ascending aorta.     Pericardium  No pericardial effusion is present.     Compared to Previous Study  This study was compared with the study from 21 .  ______________________________________________________________________________  MMode/2D Measurements & Calculations  IVSd: 1.1 cm  LVIDd: 4.0 cm  LVIDs: 2.5 cm  LVPWd: 0.94 cm  FS: 38.3 %  LV mass(C)d: 129.5 grams  LV mass(C)dI: 73.5 grams/m2  Ao root diam: 3.3 cm  asc Aorta Diam: 3.4 cm  LVOT diam: 2.2 cm  LVOT area: 3.7 cm2  RWT: 0.48     ______________________________________________________________________________  Report approved by: Mary Cool 2022 01:19 PM         Echo Limited     Value    LVEF  55-60%    Narrative    [id number]  [id number]  [id number]  [id number]^MARIO ALBERTO^HERNAN^RAJ     [facility],[place]  Echocardiography Laboratory  [address] [place] [id number]     Name: ALMAS CASIANO  MRN: [id number]  : 1960  Study Date: 2022 12:28 PM  Age: 62 yrs  Gender: Female  Patient Location: [facility]  Reason For Study: Murmur  Ordering Physician: HERNAN LLANES  Performed By: Bonifacio Lin RDCS     BSA: 1.8 m2  Height: 62 in  Weight: 164 lb  HR: 86  BP: 115/54 mmHg  ______________________________________________________________________________  Procedure  Limited Portable Echo Adult.  ______________________________________________________________________________  Interpretation Summary     Global and regional left ventricular function is normal with an  EF of 55-60%.  Global right ventricular function is normal.The right ventricle is normal  size.  No significant valvular abnormalities.  IVC diameter and respiratory changes fall into an intermediate range  suggesting an RA pressure of 8 mmHg.  No pericardial effusion is present.  This study was compared with the study from 7/3/22 there has been no change .  ______________________________________________________________________________  Left Ventricle  Left ventricular size is normal. Left ventricular wall thickness is normal.  Global and regional left ventricular function is normal with an EF of 55-60%.  No regional wall motion abnormalities are seen.     Right Ventricle  The right ventricle is normal size. Global right ventricular function is  normal.     Atria  Both atria appear normal.     Mitral Valve  The mitral valve is normal. Trace mitral insufficiency is present.     Aortic Valve  The aortic valve is tricuspid.     Tricuspid Valve  The peak velocity of the tricuspid regurgitant jet is not obtainable.  Pulmonary artery systolic pressure cannot be assessed.     Pulmonic Valve  The valve leaflets are not well visualized. On Doppler interrogation, there is  no significant stenosis or regurgitation.     Vessels  IVC diameter and respiratory changes fall into an intermediate range  suggesting an RA pressure of 8 mmHg.     Pericardium  No pericardial effusion is present.     Compared to Previous Study  This study was compared with the study from 7/3/22 there has been no change .  ______________________________________________________________________________  MMode/2D Measurements & Calculations     IVSd: 1.2 cm  LVIDd: 4.4 cm  LVIDs: 2.3 cm  LVPWd: 1.2 cm  FS: 49.2 %  LV mass(C)d: 194.5 grams  LV mass(C)dI: 110.7 grams/m2  LA dimension: 4.0 cm  RWT: 0.55     ______________________________________________________________________________  Report approved by: Martínez Tanner 07/07/2022 01:33 PM          Echo Limited     Value    LVEF  60-65%    [facility]    [id number]  [id number]  [id number]  [id number]^MAGALYS^VALERIE^JENA     [facility],[place]  Echocardiography Laboratory  [address] [place] [id number]     Name: ALMAS CASIANO  MRN: [id number]  : 1960  Study Date: 08/15/2022 01:46 PM  Age: 62 yrs  Gender: Female  Patient Location: [facility]  Reason For Study: Murmur  Ordering Physician: VALERIE DOWELL  Performed By: Cherie Castellanos VIVIANA     BSA: 1.7 m2  Height: 62 in  Weight: 153 lb  HR: 104  BP: 120/73 mmHg  ______________________________________________________________________________  Procedure  Limited Portable Echo Adult. The patient's rhythm is sinus tachycardia.  ______________________________________________________________________________  Interpretation Summary  Left ventricular size, wall motion and function are normal. The ejection  fraction is 60-65%.  Right ventricular function, chamber size, wall motion, and thickness are  normal.  Trace mitral insufficiency is present.  The inferior vena cava was normal in size with preserved respiratory  variability. No pericardial effusion is present.     There has been no change.  ______________________________________________________________________________  Left Ventricle  Left ventricular size, wall motion and function are normal. The ejection  fraction is 60-65%. Left ventricular diastolic function is not assessable. No  regional wall motion abnormalities are seen.     Right Ventricle  Right ventricular function, chamber size, wall motion, and thickness are  normal.     Atria  Both atria appear normal.     Mitral Valve  The mitral valve is normal. Trace mitral insufficiency is present.     Aortic Valve  Aortic valve is normal in structure and function. The aortic valve is  tricuspid. No aortic regurgitation is present.     Tricuspid Valve  The tricuspid valve is normal. Trace tricuspid insufficiency is present.  The  peak velocity of the tricuspid regurgitant jet is not obtainable.     Pulmonic Valve  The pulmonic valve is normal. Trace pulmonic insufficiency is present.     Vessels  The aorta root is normal. The inferior vena cava was normal in size with  preserved respiratory variability.     Pericardium  No pericardial effusion is present.     Compared to Previous Study  There has been no change.  ______________________________________________________________________________  Report approved by: Mary BABCOCK 08/15/2022 02:10 PM     ______________________________________________________________________________            Discharge Medications   Discharge Medication List as of 8/17/2022 12:20 PM      START taking these medications    Details   aspirin (ASA) 81 MG EC tablet Take 1 tablet (81 mg) by mouth daily, Disp-30 tablet, R-0, E-Prescribe      calcium carbonate 600 mg-vitamin D 400 units (CALTRATE) 600-400 MG-UNIT per tablet 1 tablet by Per J Tube route 2 times daily (with meals) for 30 days, Disp-60 tablet, R-0, E-Prescribe      CELLCEPT (BRAND) 200 MG/ML suspension 3.75 mLs (750 mg) by Per J Tube route 2 times daily, Disp-160 mL, R-0, E-Prescribe      fludrocortisone (FLORINEF) 0.1 MG tablet 1 tablet (0.1 mg) by Per J Tube route daily for 30 days, Disp-30 tablet, R-0, E-Prescribe      insulin aspart (NOVOLOG PEN) 100 UNIT/ML pen Inject 1-12 Units Subcutaneous every 4 hours, Disp-15 mL, R-0, E-Prescribe      insulin glargine (LANTUS PEN) 100 UNIT/ML pen Inject 7 Units Subcutaneous 2 times daily, Disp-15 mL, R-0, E-PrescribeIf Lantus is not covered by insurance, may substitute Basaglar or Semglee or other insulin glargine product per insurance preference at same dose and frequency.        metoprolol tartrate (LOPRESSOR) 50 MG tablet 1 tablet (50 mg) by Per Feeding Tube route 2 times daily for 30 days, Disp-60 tablet, R-0, E-Prescribe      Multiple Vitamins-Minerals (MULTIVITAMINS W/MINERALS) liquid 15 mLs by Per J  Tube route daily, Disp-450 mL, R-3, E-Prescribe      nystatin (MYCOSTATIN) 649973 UNIT/ML suspension Swish and swallow 10 mLs (1,000,000 Units) in mouth 4 times dailyDisp-1200 mL, [id number]      ondansetron (ZOFRAN ODT) 4 MG ODT tab Take 1 tablet (4 mg) by mouth every 6 hours as needed for nausea or vomiting, Disp-30 tablet, R-0, E-Prescribe      oxyCODONE (ROXICODONE) 5 MG tablet 1-2 tablets (5-10 mg) by Per J Tube route every 4 hours as needed for breakthrough pain, Disp-25 tablet, R-0, E-Prescribe      pantoprazole (PROTONIX) 2 mg/mL SUSP suspension 20 mLs (40 mg) by Per J Tube route 2 times daily for 30 days, Disp-1200 mL, R-0, E-Prescribe      protein modular (PROSOURCE TF) LIQD 1 packet by Per Feeding Tube route daily for 30 days, Disp-30 packet, R-0, E-Prescribe      simethicone (MYLICON) 40 MG/0.6ML suspension 0.6 mLs (40 mg) by Per Feeding Tube route every 6 hours as needed for cramping or flatulence, Disp-45 mL, R-0, E-Prescribe      tacrolimus (GENERIC EQUIVALENT) 1 mg/mL suspension 3.5 mLs (3.5 mg) by Per J Tube route 2 times daily, Disp-210 mL, R-1, E-Prescribe      valGANciclovir (VALCYTE) 50 MG/ML solution 9 mLs (450 mg) by Oral or Feeding Tube route three times a week, Disp-118 mL, R-1, E-Prescribe         CONTINUE these medications which have CHANGED    Details   alcohol swab prep pads Use to swab area of injection/amos as directed.Disp-100 each, [id number]      blood glucose (CONTOUR NEXT TEST) test strip Use to test blood sugar 4 times daily, as directed., Disp-400 strip, R-0, E-Prescribe      blood glucose (NO BRAND SPECIFIED) lancets standard Use to test blood sugar 4 times daily or as directed.Disp-400 lancet, [id number]      blood glucose monitoring (CONTOUR NEXT MONITOR W/DEVICE KIT) meter device kit Use to test blood sugar 4 times daily or as directed.Disp-1 kit, [id number]      dapsone (ACZONE) 25 MG tablet 2 tablets (50 mg) by Per J Tube route three times a week, Disp-26  tablet, R-11, E-Prescribe      insulin pen needle (31G X 5 MM) 31G X 5 MM miscellaneous Use one needle daily, as directedDisp-100 each, [id number]      predniSONE (DELTASONE) 5 MG tablet 2 tablets (10 mg) by Per J Tube route 2 times daily Start the evening of 8/17; Taper to 10 mg twice a day starting 8/18 with next taper due 9/15., Disp-120 tablet, R-3, E-Prescribe      Sharps Container MISC 1 sharps container, Disp-1 each, R-0, E-Prescribe         STOP taking these medications       albuterol (PROAIR HFA/PROVENTIL HFA/VENTOLIN HFA) 108 (90 BASE) MCG/ACT Inhaler Comments:   Reason for Stopping:         aspirin (ASA) 81 MG chewable tablet Comments:   Reason for Stopping:         CALCIUM-VITAMIN D PO Comments:   Reason for Stopping:         fluticasone-vilanterol (BREO ELLIPTA) 100-25 MCG/INH inhaler Comments:   Reason for Stopping:         furosemide (LASIX) 20 MG tablet Comments:   Reason for Stopping:         ipratropium - albuterol 0.5 mg/2.5 mg/3 mL (DUONEB) 0.5-2.5 (3) MG/3ML neb solution Comments:   Reason for Stopping:         Multiple Vitamin (QUINTABS) TABS Comments:   Reason for Stopping:         umeclidinium (INCRUSE ELLIPTA) 62.5 MCG/INH inhaler Comments:   Reason for Stopping:             Allergies   Allergies   Allergen Reactions     Blood Transfusion Related (Informational Only) Other (See Comments)     Patient has a history of a clinically significant antibody against RBC antigens.  A delay in compatible RBCs may occur.

## 2022-08-17 NOTE — PROGRESS NOTES
Reason for Visit  Sofie Rodriguez is a 62 year old female who is being seen for RECHECK (S/p lung tx)    Lung TX HPI  The patient was seen and examined by Harshad Gong MD     Sofie Rodriguez is a 62 year old female with a PMH significant for end-stage COPD, HTN, HFpEF, Mycobacterium peregrinum colonization, h/o hepatitis C, HECTOR s/p LEEP procedure, and former methamphetamine use.  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  Post-operative course complicated by bilateral pleural effusions, left radial artery thrombus (presumed secondary to arterial line) s/p thrombectomy 7/2, BACILIO, C diff, gastroparesis s/p GJ tube placement in IR 7/27, coffee ground G tube output s/p EGD 8/3 with pyloric ulcer noted, melena with hgb drop (8/8), elevated LFTs (8/8) with extrahepatic mass on US and MRCP with increase in biliary dilation.  S/p ERCP 8/11 with findings concerning for hemobilia, but no bleeding from ulcer in pyloric channel.  She was discharged on 8/17/22.     Today is her first clinic visit after lung transplantation.    Interval history:   She was discharged yesterday.  In general she overall feels just tired from all the activity from discharge and come to the clinic today.  She denies having any shortness of breath does have some chest pains around the surgical sites no wheezing she did.  She does have some cough with mild phlegm production.  Is mostly clear.  Her sleep was fair mostly because this is a new bed and a new place to sleep in.    Her major issue today seems to be related to her stomach symptoms.  She continues to have stomach upset/nausea on and off throughout the day.  It is especially worse after taking all her meds in the mall morning.  She has not had any emesis since yesterday.  She is has about 2-3 loose stools per day.  She in fact felt urgency after taking her meds today which led to  incontinence and felt like she saw some tablet fragments in the stools 2.    She continues to have pain in the upper abdomen and incisional pain for which she takes oxycodone about 2 or 3 times a day.    Current Outpatient Medications   Medication     alcohol swab prep pads     aspirin (ASA) 81 MG EC tablet     blood glucose (CONTOUR NEXT TEST) test strip     blood glucose (NO BRAND SPECIFIED) lancets standard     blood glucose monitoring (CONTOUR NEXT MONITOR W/DEVICE KIT) meter device kit     calcium carbonate 600 mg-vitamin D 400 units (CALTRATE) 600-400 MG-UNIT per tablet     CELLCEPT (BRAND) 200 MG/ML suspension     dapsone (ACZONE) 25 MG tablet     fludrocortisone (FLORINEF) 0.1 MG tablet     insulin aspart (NOVOLOG PEN) 100 UNIT/ML pen     insulin glargine (LANTUS PEN) 100 UNIT/ML pen     insulin pen needle (31G X 5 MM) 31G X 5 MM miscellaneous     metoprolol tartrate (LOPRESSOR) 50 MG tablet     Multiple Vitamins-Minerals (MULTIVITAMINS W/MINERALS) liquid     mycophenolate (GENERIC EQUIVALENT) 200 MG/ML suspension     nystatin (MYCOSTATIN) 952527 UNIT/ML suspension     ondansetron (ZOFRAN ODT) 4 MG ODT tab     oxyCODONE (ROXICODONE) 5 MG tablet     pantoprazole (PROTONIX) 2 mg/mL SUSP suspension     predniSONE (DELTASONE) 5 MG tablet     protein modular (PROSOURCE TF) LIQD     [employer]arps Container MISC     simethicone (MYLICON) 40 MG/0.6ML suspension     tacrolimus (GENERIC EQUIVALENT) 1 mg/mL suspension     valGANciclovir (VALCYTE) 50 MG/ML solution     No current facility-administered medications for this visit.     Allergies   Allergen Reactions     Blood Transfusion Related (Informational Only) Other (See Comments)     Patient has a history of a clinically significant antibody against RBC antigens.  A delay in compatible RBCs may occur.      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 (H) 8/17/2022     Hepatitis 2017    Hep C, [facility]     HTN (hypertension)      Osteopenia        Past Surgical History:   Procedure Laterality Date     BRONCHOSCOPY (RIGID OR FLEXIBLE), DIAGNOSTIC N/A 8/2/2022    Procedure: BRONCHOSCOPY, DIAGNOSTIC- inspection Bronch;  Surgeon: Kamala Lovell MD;  Location:  GI     BRONCHOSCOPY FLEXIBLE AND RIGID N/A 07/19/2022    Procedure: BRONCHOSCOPY inspection only;  Surgeon: Bob Liao MD;  Location:  GI     COLONOSCOPY  2015     CV CORONARY ANGIOGRAM N/A 06/30/2021    Procedure: CV CORONARY ANGIOGRAM;  Surgeon: Alexander Cuellar MD;  Location:  HEART CARDIAC CATH LAB     CV RIGHT HEART CATH MEASUREMENTS RECORDED N/A 06/30/2021    Procedure: CV RIGHT HEART CATH;  Surgeon: Alexander Cuellar MD;  Location:  HEART CARDIAC CATH LAB     ENDOSCOPIC RETROGRADE CHOLANGIOPANCREATOGRAM N/A 8/11/2022    Procedure: ENDOSCOPIC RETROGRADE CHOLANGIOPANCREATOGRAPHY WITH PANCREATIC DUCT NEEDLE KNIFE AND STENT PLACEMENT, BILE DUCT SPHINCTEROTOMY, BLOOD/DEBRIS REMOVAL AND STENT PLACEMENT;  Surgeon: Cosmo Arroyo MD;  Location:  OR     ENT SURGERY  1974    tonsillectomy     ENTEROSCOPY SMALL BOWEL N/A 8/11/2022    Procedure: SMALL BOWEL ENTEROSCOPY;  Surgeon: Cosmo Arroyo MD;  Location:  OR     ESOPHAGOGASTRODUODENOSCOPY, WITH NASOGASTRIC TUBE INSERTION N/A 07/01/2022    Procedure: ESOPHAGOGASTRODUODENOSCOPY, WITH NASOJEJUNAL TUBE INSERTION;  Surgeon: Ozzy Nickerson MD;  Location:  GI     ESOPHAGOSCOPY, GASTROSCOPY, DUODENOSCOPY (EGD), COMBINED N/A 8/3/2022    Procedure: ESOPHAGOGASTRODUODENOSCOPY (EGD);  Surgeon: Ira Andres MD;  Location:  GI     HAND SURGERY       LEEP TX, CERVICAL  04/07/2017    HECTOR III     LYMPH NODE BIOPSY Left 2005    Left axilla, benign- [place]     MIDLINE INSERTION - DOUBLE LUMEN Left 07/28/2022    20cm, Basilic vein     THROMBECTOMY UPPER EXTREMITY Left  2022    Procedure: LEFT RADIAL ARM THROMBECTOMY;  Surgeon: Christie Graham MD;  Location: [facility] OR     TRANSPLANT LUNG RECIPIENT SINGLE X2 Bilateral 2022    Procedure: Clamshell Incision, Bilateral Sequential Lung Transplant, On Cardiopulmonary Bypass, Flexible Bronchoscopy;  Surgeon: Sue Sunshine MD;  Location: [facility] OR       Social History     Socioeconomic History     Marital status: Single     Spouse name: Not on file     Number of children: Not on file     Years of education: Not on file     Highest education level: Not on file   Occupational History     Not on file   Tobacco Use     Smoking status: Former Smoker     Years: 30.00     Types: Cigarettes     Quit date: 2020     Years since quittin.7     Smokeless tobacco: Never Used   Substance and Sexual Activity     Alcohol use: Not Currently     Drug use: Not Currently     Types: Marijuana, Methamphetamines     Comment: hx:marijuana and methamphetamine-quit both unsure ?  2-3 yrs ago     Sexual activity: Not on file   Other Topics Concern     Parent/sibling w/ CABG, MI or angioplasty before 65F 55M? Not Asked   Social History Narrative     Not on file     Social Determinants of Health     Financial Resource Strain: Not on file   Food Insecurity: Not on file   Transportation Needs: Not on file   Physical Activity: Not on file   Stress: Not on file   Social Connections: Not on file   Intimate Partner Violence: Not on file   Housing Stability: Not on file       ROS Pulmonary  Constitutional- Negative  Eyes- Negative  Ear, nose and throat- Negative  Cardiac- Negative  Pulm- See HPI  GI- Negative  Genitourinary- Negative  Musculoskeletal- Negative  Neurology- Negative  Dermatology- Negative  Endocrine- Negative  Lymphatic- Negative  Psychiatry- Negative    A complete ROS was otherwise negative except as noted in the HPI.  /68 (BP Location: Right arm, Patient Position: Sitting, Cuff Size: Adult Regular)   Pulse 101   Wt 69.9  kg (154 lb)   SpO2 99%   BMI 28.17 kg/m     Exam:   GENERAL APPEARANCE: Well developed, well nourished, alert, and in no apparent distress.  EYES: PERRL, EOMI  HENT: Nasal mucosa with no edema and no hyperemia. No nasal polyps.  EARS: Canals clear, TMs normal  MOUTH: Oral mucosa is moist, without any lesions, no tonsillar enlargement, no oropharyngeal exudate.  NECK: supple, no masses, no thyromegaly.  LYMPHATICS: No significant axillary, cervical, or supraclavicular nodes.  RESP: normal percussion, Diminished air flow in the left base.  No crackles. No wheezes.  CV: Normal S1, S2, regular rhythm, normal rate. No murmur.  No rub. No gallop. No LE edema.   ABDOMEN:  Bowel sounds normal, soft, Rt and left UQ tenderness, no HSM or masses. GJtube in place.  MS: extremities normal. No clubbing. No cyanosis.  SKIN: no rash on limited exam  NEURO: Mentation intact, speech normal, normal strength and tone, normal gait and stance  PSYCH: mentation appears normal. and affect normal/bright.    Results:  Recent Results (from the past 168 hour(s))   ENDOSCOPIC RETROGRADE CHOLANGIOPANCREATOGRAPHY    Collection Time: 08/11/22  1:11 PM   Result Value Ref Range    ERCP       [facility]  [address] [place] [address] [phone]     Endoscopy Department  _______________________________________________________________________________  Patient Name: Sofie Rodriguez            Procedure Date: 8/11/2022 1:11 PM  MRN: [id number]                       Account Number: [id number]  YOB: 1960               Admit Type: Inpatient  Age: 62                               Room: [place] [address]  Gender: Female                        Note Status: Finalized  Attending MD: MERY FINE MD  Total Sedation Time:   _______________________________________________________________________________     Procedure:             ERCP  Indications:           Sofie Nichols a 62 year old female with a PMHx of                           COPD s/p bilateral lung transplant (6/28/22)                          complicated by acute limb ischemia of LUE requiring                          left radial thrombectomy, EBV viremia and C.diff                           (vancomycin 7/12/22-7/28/22). 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 She                          started having melena with an associated hemoglobin                          drop to 6.5 (baseline 7-8).She is also noted to have                          an abdominal pain with an associated elevated                          LFTs.MRCP shows mild dilation of intrahepatic and                          extrahepatic biliary ducts and gallbladder with sludge                          extending to the bladder neck. Normal TB (0.2).                          Elevated alk phos (412) and ALT (215). She presents                          for an EGD for evaluation of acute blood loss anemia                           in the setting of PUDs and an ERCP for evaluation of                          possible. Hemoglobin 7.9, platelets 322, INR 0.95, Cr                          1.50.?  Providers:             MERY FINE MD, ROLANDO RENAE, Jennifer Vanderheyden  Referring MD:          AMADOR BROWN MD  Requesting Provider:   VALERIE DOWELL MD  Medicines:             General Anesthesia, Levaquin 500 mg IV  Complications:         No immediate complications.  _______________________________________________________________________________  Procedure:             Pre-Anesthesia Assessment:                         - Prior to the procedure, a History and Physical was                           performed, and patient medications and allergies were                          reviewed. The patient is competent. The risks and                          benefits of the procedure and the sedation options and                          risks were discussed with the patient.  All questions                          were answered and informed consent was obtained.                          Patient identification and proposed procedure were                          verified by the physician, the nurse and the                          anesthesiologist in the procedure room. Mental Status                          Examination: alert and oriented. Airway Examination:                          normal oropharyngeal airway and neck mobility.                          Respiratory Examination: clear to auscultation. CV                          Examination: RRR, no murmurs, no S3 or S4.                          Prophylactic Antibiotics: The patient requires                          prophylactic antibiotics. Prior Anticoagulants: The                          patient has taken no anticoagulant or antiplatelet                          agents. ASA Grade Assessment: III - A patient with                          severe systemic disease. After reviewing the risks and                           benefits, the patient was deemed in satisfactory                          condition to undergo the procedure. The anesthesia                          plan was to use general anesthesia. Immediately prior                          to administration of medications, the patient was                          re-assessed for adequacy to receive sedatives. The                          heart rate, respiratory rate, oxygen saturations,                          blood pressure, adequacy of pulmonary ventilation, and                          response to care were monitored throughout the                          procedure. The physical status of the patient  was                          re-assessed after the procedure.                         After obtaining informed consent, the scope was passed                          under direct vision. Throughout the procedure, the                          patient's blood pressure, pulse, and oxygen                          saturations were monit ored continuously. The                          Duodenoscope was introduced through the mouth, and                          used to inject contrast into and used to inject                          contrast into the bile duct and ventral pancreatic                          duct. The ERCP was technically difficult and complex                          due to challenging cannulation because of inability to                          visualize the major papilla. The patient tolerated the                          procedure well.                                                                                   Findings:       Patient underwent an EGD prior to the ERCP given recent history of acute        blood loss anemia. PEG-J was seen in the expected location of the        stomach going through the pyloric channel into the duodenum. Mild        erythema secondary to PEG-J trauma seen near the PEG-J insertion site        but no active bleeding. The PEG-J was pulled out partially from the         small bowel and pyloric channel into the gastric body to facilitate        scope passage into the duodenum. Hematin mixed with bile is seen in the        second and third portion of the duodenum but no definite source of        active bleeding. A pediatric colonoscope was inserted and advanced to        the proximal jejunum and showed hematin mixed with bile in the second        and third portion of the duodenum but no source of active bleeding (AVMs        vs Dieulafoy lesions vs ulceration). Finding was concerning for        hemobilia and decision was made to proceed with the ERCP.       The  film  was normal. The esophagus was successfully intubated        under direct vision. The scope was advanced to a normal major papilla in        the descending duodenum without detailed examination of the pharynx,        larynx and associated structures, and upper GI tract. The upper GI tract        was grossly normal. The major papilla was congested. The ventral        pancreatic duct w as deeply cannulated with the short-nosed traction        sphincterotome. One 4 Fr by 11 cm temporary Arroyo pancreatic flex        stent with a single external pigtail and no internal flaps was placed 8        cm into the ventral pancreatic duct. Clear fluid flowed through the        stent. The stent was in good position. It was difficulty to succussfully        cannulate the biliary duct. A needle knife precut sphincterotomy was        performed and this enabled successful cannulation of the common bile        duct. A 0.025 inch x 270 cm angled Visiglide wire passed successfully        into the left main hepatic duct. A significant amount of blood mixed        with bile came pouring from the common bile duct. This is believed to be        the patient's source of acute blood loss anemia. Contrast was injected.        I personally interpreted the bile duct images. Ductal flow of contrast        was adequate. Image quality was excellent. Contrast extended to the        entire biliary t ree. No significant filling defect or strictures seen on        contrast study. Biliary sphincterotomy was made with a monofilament        traction (standard) sphincterotome using ERBE electrocautery. There was        no post-sphincterotomy bleeding. To discover objects, the biliary tree        was swept with a 12 mm balloon starting at the bifurcation. Old blood        and minimal sludge was swept from the duct. One [place] Viabil [id number]        temporary stent was placed 4 cm into the common bile duct. Bile flowed        through the stent. The stent was in  good position.       The prevoiusly placed gastrojejujunal feeding tube was removed to        complete uppper procedures and was replaced at the end of the procedure.        A pediatric upper endoscope was advanced through gastrostomy tract to        the proximal jejunum. A 0.035 inch Glide wire was advanced into the        jejunum and the endoscope removed. An 18 Fr by 53 cm gastro-jejunal        feeding tube was advanced into the je junum over the wire and position        confirmed fluoroscopically.       CODE MODIFIER 22, complex procedure in three phases.                                                                                   Impression:            - Fresh blood in second thrid portion of duodenum, no                          mucosal lesion seen despite EGD, enteroscopy and                          duodenoscopy.                         - After biliary access, claer that source of bleed was                          hemobilia.                         - Stenotic major papilla, access by pancreatic stent                          protected needle knife precut.extension of biliary                          sphincterotomy was then extended with an monofilement                          wire                         - Heme mixed with blood concerning for hemobilia seen                          coming from the biliary ducts                         - Balloon sweep extracted more old blood and some                           sludge                         - No focal stricture to suggest intraductal tumor, and                          source of hemobilia unclear, may be gallbladder as                          suggested by CT                         - A transpabillary [place] Viabil [id number] temporary stent                          was placed into the common bile duct to tamponade any                          bleeding and allow passage of clots.                         - One 4 Fr by 11 cm temporary Arroyo pancreatic flex                           stent was placed for prophylaxis  Recommendation:        - Return patient to the hospital [facility] for ongoing care                         - Obtain a CTA tonight to rule out ongoing hemobilia                          and if evidence of bleeding, notify GI call team and                          consult IR                         - Admit patient to a higher acuity floor                         - Monitor H/H overnight and transfuse as appropriate                          - I called patient's son (Gera Rodriguez), however,                          went to voicemail                         - Discussed recommendations with Dr. Gama who is                          covering the patient                                                                                     Electronically signed by RAJ Arroyo  ________________________  MERY ARROYO MD  8/17/2022 9:40:08 AM  I was physically present for the entire viewing portion of the exam.  __________________________  Signature of teaching physician  [id number] ARROYO MD    __________________  ROLANDO RENAE,   Number of Addenda: 0    Note Initiated On: 8/11/2022 1:11 PM  Scope In:  Scope Out:     Glucose by meter    Collection Time: 08/11/22  4:52 PM   Result Value Ref Range    GLUCOSE BY METER POCT 237 (H) 70 - 99 mg/dL   CBC with platelets and differential    Collection Time: 08/11/22  4:57 PM   Result Value Ref Range    WBC Count 10.2 4.0 - 11.0 10e3/uL    RBC Count 2.76 (L) 3.80 - 5.20 10e6/uL    Hemoglobin 8.4 (L) 11.7 - 15.7 g/dL    Hematocrit 26.8 (L) 35.0 - 47.0 %    MCV 97 78 - 100 fL    MCH 30.4 26.5 - 33.0 pg    MCHC 31.3 (L) 31.5 - 36.5 g/dL    RDW 16.6 (H) 10.0 - 15.0 %    Platelet Count 342 150 - 450 10e3/uL    % Neutrophils 90 %    % Lymphocytes 2 %    % Monocytes 6 %    % Eosinophils 0 %    % Basophils 0 %    % Immature Granulocytes 2 %    NRBCs per 100 WBC 0 <1 /100    Absolute Neutrophils 9.1 (H) 1.6 - 8.3 10e3/uL     Absolute Lymphocytes 0.2 (L) 0.8 - 5.3 10e3/uL    Absolute Monocytes 0.6 0.0 - 1.3 10e3/uL    Absolute Eosinophils 0.0 0.0 - 0.7 10e3/uL    Absolute Basophils 0.0 0.0 - 0.2 10e3/uL    Absolute Immature Granulocytes 0.2 <=0.4 10e3/uL    Absolute NRBCs 0.0 10e3/uL   INR    Collection Time: 08/11/22  6:10 PM   Result Value Ref Range    INR 0.95 0.85 - 1.15   Glucose by meter    Collection Time: 08/11/22  8:50 PM   Result Value Ref Range    GLUCOSE BY METER POCT 148 (H) 70 - 99 mg/dL   Hemoglobin    Collection Time: 08/11/22 11:39 PM   Result Value Ref Range    Hemoglobin 8.1 (L) 11.7 - 15.7 g/dL   Glucose by meter    Collection Time: 08/12/22 12:44 AM   Result Value Ref Range    GLUCOSE BY METER POCT 116 (H) 70 - 99 mg/dL   Glucose by meter    Collection Time: 08/12/22  4:06 AM   Result Value Ref Range    GLUCOSE BY METER POCT 135 (H) 70 - 99 mg/dL   Tacrolimus by Tandem Mass Spectrometry    Collection Time: 08/12/22  7:36 AM   Result Value Ref Range    Tacrolimus by Tandem Mass Spectrometry 8.2 5.0 - 15.0 ug/L    Tacrolimus Last Dose Date      Tacrolimus Last Dose Time     Comprehensive metabolic panel    Collection Time: 08/12/22  7:36 AM   Result Value Ref Range    Sodium 139 136 - 145 mmol/L    Potassium 5.2 3.4 - 5.3 mmol/L    Creatinine 1.42 (H) 0.51 - 0.95 mg/dL    Urea Nitrogen 42.9 (H) 8.0 - 23.0 mg/dL    Chloride 99 98 - 107 mmol/L    Carbon Dioxide (CO2) 35 (H) 22 - 29 mmol/L    Anion Gap 5 (L) 7 - 15 mmol/L    Glucose 133 (H) 70 - 99 mg/dL    Calcium 8.7 (L) 8.8 - 10.2 mg/dL    Protein Total 5.0 (L) 6.4 - 8.3 g/dL    Albumin 2.6 (L) 3.5 - 5.2 g/dL    Bilirubin Total 0.2 <=1.2 mg/dL    Alkaline Phosphatase 344 (H) 35 - 104 U/L    AST 15 10 - 35 U/L     (H) 10 - 35 U/L    GFR Estimate 42 (L) >60 mL/min/1.73m2   Magnesium    Collection Time: 08/12/22  7:36 AM   Result Value Ref Range    Magnesium 2.1 1.7 - 2.3 mg/dL   Phosphorus    Collection Time: 08/12/22  7:36 AM   Result Value Ref Range     Phosphorus 3.9 2.5 - 4.5 mg/dL   CEA    Collection Time: 08/12/22  7:36 AM   Result Value Ref Range    CEA 1.9 ng/mL   Glucose by meter    Collection Time: 08/12/22  8:35 AM   Result Value Ref Range    GLUCOSE BY METER POCT 129 (H) 70 - 99 mg/dL   CBC with platelets    Collection Time: 08/12/22  9:55 AM   Result Value Ref Range    WBC Count 7.4 4.0 - 11.0 10e3/uL    RBC Count 2.98 (L) 3.80 - 5.20 10e6/uL    Hemoglobin 9.1 (L) 11.7 - 15.7 g/dL    Hematocrit 29.5 (L) 35.0 - 47.0 %    MCV 99 78 - 100 fL    MCH 30.5 26.5 - 33.0 pg    MCHC 30.8 (L) 31.5 - 36.5 g/dL    RDW 16.6 (H) 10.0 - 15.0 %    Platelet Count 344 150 - 450 10e3/uL   Extra Green Top (Lithium Heparin) Tube    Collection Time: 08/12/22  9:55 AM   Result Value Ref Range    Hold Specimen JIC    Lactate Dehydrogenase    Collection Time: 08/12/22  9:55 AM   Result Value Ref Range    Lactate Dehydrogenase 958 (H) 0 - 250 U/L   Protein total    Collection Time: 08/12/22  9:55 AM   Result Value Ref Range    Protein Total 5.6 (L) 6.4 - 8.3 g/dL   Cancer antigen 19-9    Collection Time: 08/12/22  9:55 AM   Result Value Ref Range    Cancer Antigen 19-9 20 <=35 U/mL   Glucose by meter    Collection Time: 08/12/22 11:58 AM   Result Value Ref Range    GLUCOSE BY METER POCT 144 (H) 70 - 99 mg/dL   Pleural fluid Aerobic Bacterial Culture Routine    Collection Time: 08/12/22  3:24 PM    Specimen: Pleural Cavity, Right; Pleural fluid   Result Value Ref Range    Culture No Growth    Glucose fluid    Collection Time: 08/12/22  3:24 PM   Result Value Ref Range    Glucose Fluid Source Pleural Cavity, Right     Glucose fluid 150 mg/dL   Gram stain    Collection Time: 08/12/22  3:24 PM    Specimen: Pleural Cavity, Right; Pleural fluid   Result Value Ref Range    Gram Stain Result No organisms seen     Gram Stain Result 2+ WBC seen    Lactate dehydrogenase fluid    Collection Time: 08/12/22  3:24 PM   Result Value Ref Range    LD Fluid Source Pleural Cavity, Right     Lactate  dehydrogenase fluid 121 U/L   Protein fluid    Collection Time: 08/12/22  3:24 PM   Result Value Ref Range    Protein Fluid Source Pleural Cavity, Right     Protein Total Fluid 2.1 g/dL   Anaerobic bacterial culture    Collection Time: 08/12/22  3:24 PM    Specimen: Pleural Cavity, Right; Pleural fluid   Result Value Ref Range    Culture No anaerobic organisms isolated after 5 days    Cytology, non-gynecologic    Collection Time: 08/12/22  3:24 PM   Result Value Ref Range    Final Diagnosis       Specimen A     Interpretation:      Negative for malignancy     Adequacy:     Satisfactory for evaluation          Clinical Information       The patient is a 62 year old female with 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 Pleural Cavity, Right, :A. Pleural Cavity, Right, , Pleural Fluid:  Received 10.5 ml of cloudy orange fluid, processed as 1 Pap stained Autocyte,  1 Whitlock stained cytospin and one hematoxylin and eosin stained cell block.                  Microscopic Description       Microscopic examination was performed.    A resident/fellow in an ACGME accredited training program was involved in the initial review, preparation, and/or interpretation of this case.  I, as the senior physician, attest that I: (i) reviewed patient clinical records if indicated; (ii) reviewed relevant lab test results; (iii) examined the relevant preparation(s) for the specimen(s); and (iv) agree with the report, diagnosis(es), and interpretation as documented by the resident/fellow and edited/confirmed by me. Reporting resident/fellow: Dorothy Rasheed [id number]      Performing Labs       The technical component of this testing was completed at [facility]     Fungus Culture, non-blood    Collection Time: 08/12/22  3:24 PM    Specimen: Pleural Cavity, Right; Pleural fluid   Result Value Ref Range    Culture No growth after 5 days     Triglyceride Fluid    Collection Time: 08/12/22  3:24 PM   Result Value Ref Range    Triglyceride Fluid Source Pleural Cavity, Right     Triglyceride Fluid 19 mg/dL   Cell Count Body Fluid    Collection Time: 08/12/22  3:24 PM   Result Value Ref Range    Color Yellow Colorless, Yellow    Clarity Cloudy (A) Clear, Bloody    Total Nucleated Cells 228 /uL    Cell Count Fluid Source Pleural Cavity, Right    Acid-Fast Bacilli Culture and Stain    Collection Time: 08/12/22  3:24 PM    Specimen: Pleural Cavity, Right; Pleural fluid   Result Value Ref Range    Acid Fast Stain No acid fast bacilli seen     Acid Fast Stain No acid fast bacilli seen    Differential Body Fluid    Collection Time: 08/12/22  3:24 PM   Result Value Ref Range    % Neutrophils 39 %    % Lymphocytes 43 %    % Monocyte/Macrophages 18 %   Glucose by meter    Collection Time: 08/12/22  4:01 PM   Result Value Ref Range    GLUCOSE BY METER POCT 174 (H) 70 - 99 mg/dL   INR    Collection Time: 08/12/22  4:57 PM   Result Value Ref Range    INR 0.93 0.85 - 1.15   Partial thromboplastin time    Collection Time: 08/12/22  4:57 PM   Result Value Ref Range    aPTT 25 22 - 38 Seconds   Glucose by meter    Collection Time: 08/12/22  8:03 PM   Result Value Ref Range    GLUCOSE BY METER POCT 132 (H) 70 - 99 mg/dL   Glucose by meter    Collection Time: 08/13/22  8:12 AM   Result Value Ref Range    GLUCOSE BY METER POCT 130 (H) 70 - 99 mg/dL   Comprehensive metabolic panel    Collection Time: 08/13/22  8:41 AM   Result Value Ref Range    Sodium 144 136 - 145 mmol/L    Potassium 5.2 3.4 - 5.3 mmol/L    Creatinine 1.46 (H) 0.51 - 0.95 mg/dL    Urea Nitrogen 42.4 (H) 8.0 - 23.0 mg/dL    Chloride 100 98 - 107 mmol/L    Carbon Dioxide (CO2) 36 (H) 22 - 29 mmol/L    Anion Gap 8 7 - 15 mmol/L    Glucose 139 (H) 70 - 99 mg/dL    Calcium 9.1 8.8 - 10.2 mg/dL    Protein Total 5.6 (L) 6.4 - 8.3 g/dL    Albumin 2.8 (L) 3.5 - 5.2 g/dL    Bilirubin Total 0.2 <=1.2 mg/dL     Alkaline Phosphatase 345 (H) 35 - 104 U/L    AST 18 10 - 35 U/L     (H) 10 - 35 U/L    GFR Estimate 41 (L) >60 mL/min/1.73m2   Tacrolimus by Tandem Mass Spectrometry    Collection Time: 08/13/22  8:41 AM   Result Value Ref Range    Tacrolimus by Tandem Mass Spectrometry 41.3 (HH) 5.0 - 15.0 ug/L    Tacrolimus Last Dose Date      Tacrolimus Last Dose Time     CBC with platelets and differential    Collection Time: 08/13/22  8:41 AM   Result Value Ref Range    WBC Count 7.9 4.0 - 11.0 10e3/uL    RBC Count 3.05 (L) 3.80 - 5.20 10e6/uL    Hemoglobin 9.2 (L) 11.7 - 15.7 g/dL    Hematocrit 30.4 (L) 35.0 - 47.0 %     78 - 100 fL    MCH 30.2 26.5 - 33.0 pg    MCHC 30.3 (L) 31.5 - 36.5 g/dL    RDW 16.3 (H) 10.0 - 15.0 %    Platelet Count 422 150 - 450 10e3/uL    % Neutrophils 86 %    % Lymphocytes 4 %    % Monocytes 7 %    % Eosinophils 0 %    % Basophils 0 %    % Immature Granulocytes 3 %    NRBCs per 100 WBC 0 <1 /100    Absolute Neutrophils 6.7 1.6 - 8.3 10e3/uL    Absolute Lymphocytes 0.3 (L) 0.8 - 5.3 10e3/uL    Absolute Monocytes 0.6 0.0 - 1.3 10e3/uL    Absolute Eosinophils 0.0 0.0 - 0.7 10e3/uL    Absolute Basophils 0.0 0.0 - 0.2 10e3/uL    Absolute Immature Granulocytes 0.3 <=0.4 10e3/uL    Absolute NRBCs 0.0 10e3/uL   Glucose by meter    Collection Time: 08/13/22 12:38 PM   Result Value Ref Range    GLUCOSE BY METER POCT 178 (H) 70 - 99 mg/dL   Glucose by meter    Collection Time: 08/13/22  5:56 PM   Result Value Ref Range    GLUCOSE BY METER POCT 277 (H) 70 - 99 mg/dL   Glucose by meter    Collection Time: 08/13/22 11:21 PM   Result Value Ref Range    GLUCOSE BY METER POCT 234 (H) 70 - 99 mg/dL   Glucose by meter    Collection Time: 08/14/22  4:51 AM   Result Value Ref Range    GLUCOSE BY METER POCT 162 (H) 70 - 99 mg/dL   Tacrolimus by Tandem Mass Spectrometry    Collection Time: 08/14/22  6:30 AM   Result Value Ref Range    Tacrolimus by Tandem Mass Spectrometry 10.6 5.0 - 15.0 ug/L    Tacrolimus  Last Dose Date      Tacrolimus Last Dose Time     Extra Green Top (Lithium Heparin) Tube    Collection Time: 08/14/22  6:53 AM   Result Value Ref Range    Hold Specimen [facility]    Basic metabolic panel    Collection Time: 08/14/22  6:53 AM   Result Value Ref Range    Creatinine 1.58 (H) 0.51 - 0.95 mg/dL    Sodium 143 136 - 145 mmol/L    Potassium 4.2 3.4 - 5.3 mmol/L    Urea Nitrogen 50.2 (H) 8.0 - 23.0 mg/dL    Chloride 101 98 - 107 mmol/L    Carbon Dioxide (CO2) 35 (H) 22 - 29 mmol/L    Anion Gap 7 7 - 15 mmol/L    Glucose 152 (H) 70 - 99 mg/dL    GFR Estimate 37 (L) >60 mL/min/1.73m2    Calcium 8.8 8.8 - 10.2 mg/dL   Hepatic panel    Collection Time: 08/14/22  6:53 AM   Result Value Ref Range    Protein Total 5.2 (L) 6.4 - 8.3 g/dL    Albumin 2.6 (L) 3.5 - 5.2 g/dL    Bilirubin Total <0.2 <=1.2 mg/dL    Alkaline Phosphatase 278 (H) 35 - 104 U/L    AST 18 10 - 35 U/L    ALT 90 (H) 10 - 35 U/L    Bilirubin Direct <0.20 0.00 - 0.30 mg/dL   Glucose by meter    Collection Time: 08/14/22  9:27 AM   Result Value Ref Range    GLUCOSE BY METER POCT 186 (H) 70 - 99 mg/dL   CBC with platelets    Collection Time: 08/14/22 11:22 AM   Result Value Ref Range    WBC Count 10.4 4.0 - 11.0 10e3/uL    RBC Count 3.12 (L) 3.80 - 5.20 10e6/uL    Hemoglobin 9.4 (L) 11.7 - 15.7 g/dL    Hematocrit 31.8 (L) 35.0 - 47.0 %     (H) 78 - 100 fL    MCH 30.1 26.5 - 33.0 pg    MCHC 29.6 (L) 31.5 - 36.5 g/dL    RDW 16.7 (H) 10.0 - 15.0 %    Platelet Count 407 150 - 450 10e3/uL   Glucose by meter    Collection Time: 08/14/22 11:30 AM   Result Value Ref Range    GLUCOSE BY METER POCT 145 (H) 70 - 99 mg/dL   Glucose by meter    Collection Time: 08/14/22  5:25 PM   Result Value Ref Range    GLUCOSE BY METER POCT 207 (H) 70 - 99 mg/dL   Glucose by meter    Collection Time: 08/14/22  7:44 PM   Result Value Ref Range    GLUCOSE BY METER POCT 216 (H) 70 - 99 mg/dL   Glucose by meter    Collection Time: 08/15/22 12:01 AM   Result Value Ref Range     GLUCOSE BY METER POCT 189 (H) 70 - 99 mg/dL   Glucose by meter    Collection Time: 08/15/22  4:16 AM   Result Value Ref Range    GLUCOSE BY METER POCT 240 (H) 70 - 99 mg/dL   Tacrolimus by Tandem Mass Spectrometry    Collection Time: 08/15/22  6:28 AM   Result Value Ref Range    Tacrolimus by Tandem Mass Spectrometry 14.2 5.0 - 15.0 ug/L    Tacrolimus Last Dose Date      Tacrolimus Last Dose Time     Extra Green Top (Lithium Heparin) Tube    Collection Time: 08/15/22  6:28 AM   Result Value Ref Range    Hold Specimen [facility]    Extra Purple Top Tube    Collection Time: 08/15/22  6:28 AM   Result Value Ref Range    Hold Specimen [facility]    CBC with platelets    Collection Time: 08/15/22  6:28 AM   Result Value Ref Range    WBC Count 10.5 4.0 - 11.0 10e3/uL    RBC Count 2.86 (L) 3.80 - 5.20 10e6/uL    Hemoglobin 8.8 (L) 11.7 - 15.7 g/dL    Hematocrit 28.9 (L) 35.0 - 47.0 %     (H) 78 - 100 fL    MCH 30.8 26.5 - 33.0 pg    MCHC 30.4 (L) 31.5 - 36.5 g/dL    RDW 16.5 (H) 10.0 - 15.0 %    Platelet Count 390 150 - 450 10e3/uL   HLA Donor Specific Antibody    Collection Time: 08/15/22  6:28 AM   Result Value Ref Range    Donor Identification 06/28/2022     Organ Lung     DSA Present YES     DQB2 3584     DSA Comments        Flow Single Antigen Beads assays are intended for detection/identification of IgG anti-HLA antibodies. Mfi values may not accurately quantify donor-specific antibody levels in all instances.    DSA Test Method SA FCS    HLA Adryan Class I, Single Antigen    Collection Time: 08/15/22  6:28 AM   Result Value Ref Range    SA 1 TEST METHOD SA FCS     SA 1 CELL Class I     SA1 HI RISK ADRYAN None     SA1 MOD RISK ADRYAN None     SA 1  COMMENTS        Test performed by modified procedure. Serum heat inactivated and tested by a modified ([place]) protocol including fetal calf serum addition. High-risk, mfi >3,000. Mod-risk, mfi 500-3,000.   HLA Adrayn Class II, Single Antigen    Collection Time: 08/15/22  6:28 AM   Result  Value Ref Range    SA 2 TEST METHOD SA FCS     SA 2 CELL Class II     SA2 HI RISK ADRYAN DQ:2     SA2 MOD RISK ADRYAN None     SA 2 COMMENTS        Test performed by modified procedure. Serum heat inactivated and tested by a modified ([place]) protocol including fetal calf serum addition. High-risk, mfi >3,000. Mod-risk, mfi 500-3,000.   HLA Adryan, CPRA    Collection Time: 08/15/22  6:28 AM   Result Value Ref Range    UNOS CPRA 37     UNACCEPTABLE ANTIGENS DQ:2     Glucose by meter    Collection Time: 08/15/22  9:02 AM   Result Value Ref Range    GLUCOSE BY METER POCT 164 (H) 70 - 99 mg/dL   Glucose by meter    Collection Time: 08/15/22 12:07 PM   Result Value Ref Range    GLUCOSE BY METER POCT 175 (H) 70 - 99 mg/dL   Echo Limited    Collection Time: 08/15/22  2:00 PM   Result Value Ref Range    LVEF  60-65%    Glucose by meter    Collection Time: 08/15/22  3:51 PM   Result Value Ref Range    GLUCOSE BY METER POCT 219 (H) 70 - 99 mg/dL   Glucose by meter    Collection Time: 08/15/22  8:55 PM   Result Value Ref Range    GLUCOSE BY METER POCT 196 (H) 70 - 99 mg/dL   Glucose by meter    Collection Time: 08/16/22  4:22 AM   Result Value Ref Range    GLUCOSE BY METER POCT 193 (H) 70 - 99 mg/dL   Glucose by meter    Collection Time: 08/16/22  8:20 AM   Result Value Ref Range    GLUCOSE BY METER POCT 189 (H) 70 - 99 mg/dL   Glucose by meter    Collection Time: 08/16/22 12:08 PM   Result Value Ref Range    GLUCOSE BY METER POCT 135 (H) 70 - 99 mg/dL   Basic metabolic panel    Collection Time: 08/16/22  2:17 PM   Result Value Ref Range    Creatinine 1.56 (H) 0.51 - 0.95 mg/dL    Sodium 139 136 - 145 mmol/L    Potassium 4.8 3.4 - 5.3 mmol/L    Urea Nitrogen 71.1 (H) 8.0 - 23.0 mg/dL    Chloride 95 (L) 98 - 107 mmol/L    Carbon Dioxide (CO2) 37 (H) 22 - 29 mmol/L    Anion Gap 7 7 - 15 mmol/L    Glucose 173 (H) 70 - 99 mg/dL    GFR Estimate 38 (L) >60 mL/min/1.73m2    Calcium 9.3 8.8 - 10.2 mg/dL   CBC with platelets    Collection Time:  08/16/22  2:17 PM   Result Value Ref Range    WBC Count 11.7 (H) 4.0 - 11.0 10e3/uL    RBC Count 2.94 (L) 3.80 - 5.20 10e6/uL    Hemoglobin 9.1 (L) 11.7 - 15.7 g/dL    Hematocrit 29.7 (L) 35.0 - 47.0 %     (H) 78 - 100 fL    MCH 31.0 26.5 - 33.0 pg    MCHC 30.6 (L) 31.5 - 36.5 g/dL    RDW 16.4 (H) 10.0 - 15.0 %    Platelet Count 404 150 - 450 10e3/uL   Glucose by meter    Collection Time: 08/16/22  4:14 PM   Result Value Ref Range    GLUCOSE BY METER POCT 176 (H) 70 - 99 mg/dL   Glucose by meter    Collection Time: 08/16/22  5:03 PM   Result Value Ref Range    GLUCOSE BY METER POCT 171 (H) 70 - 99 mg/dL   Glucose by meter    Collection Time: 08/16/22  6:34 PM   Result Value Ref Range    GLUCOSE BY METER POCT 200 (H) 70 - 99 mg/dL   Glucose by meter    Collection Time: 08/16/22  7:55 PM   Result Value Ref Range    GLUCOSE BY METER POCT 138 (H) 70 - 99 mg/dL   Glucose by meter    Collection Time: 08/16/22  8:59 PM   Result Value Ref Range    GLUCOSE BY METER POCT 120 (H) 70 - 99 mg/dL   Glucose by meter    Collection Time: 08/17/22 12:28 AM   Result Value Ref Range    GLUCOSE BY METER POCT 165 (H) 70 - 99 mg/dL   Glucose by meter    Collection Time: 08/17/22  4:52 AM   Result Value Ref Range    GLUCOSE BY METER POCT 167 (H) 70 - 99 mg/dL   Basic metabolic panel    Collection Time: 08/17/22  7:06 AM   Result Value Ref Range    Creatinine 1.48 (H) 0.51 - 0.95 mg/dL    Sodium 140 136 - 145 mmol/L    Potassium 5.0 3.4 - 5.3 mmol/L    Urea Nitrogen 68.1 (H) 8.0 - 23.0 mg/dL    Chloride 95 (L) 98 - 107 mmol/L    Carbon Dioxide (CO2) 39 (H) 22 - 29 mmol/L    Anion Gap 6 (L) 7 - 15 mmol/L    Glucose 176 (H) 70 - 99 mg/dL    GFR Estimate 40 (L) >60 mL/min/1.73m2    Calcium 9.0 8.8 - 10.2 mg/dL   Magnesium    Collection Time: 08/17/22  7:06 AM   Result Value Ref Range    Magnesium 2.2 1.7 - 2.3 mg/dL   Extra Purple Top Tube    Collection Time: 08/17/22  7:30 AM   Result Value Ref Range    Hold Specimen [facility]    Glucose by  meter    Collection Time: 08/17/22  7:51 AM   Result Value Ref Range    GLUCOSE BY METER POCT 162 (H) 70 - 99 mg/dL   Glucose by meter    Collection Time: 08/17/22  9:43 AM   Result Value Ref Range    GLUCOSE BY METER POCT 145 (H) 70 - 99 mg/dL   Magnesium    Collection Time: 08/18/22 10:33 AM   Result Value Ref Range    Magnesium 2.6 (H) 1.6 - 2.3 mg/dL   CBC with platelets    Collection Time: 08/18/22 10:33 AM   Result Value Ref Range    WBC Count 8.7 4.0 - 11.0 10e3/uL    RBC Count 2.79 (L) 3.80 - 5.20 10e6/uL    Hemoglobin 8.6 (L) 11.7 - 15.7 g/dL    Hematocrit 28.3 (L) 35.0 - 47.0 %     (H) 78 - 100 fL    MCH 30.8 26.5 - 33.0 pg    MCHC 30.4 (L) 31.5 - 36.5 g/dL    RDW 16.7 (H) 10.0 - 15.0 %    Platelet Count 387 150 - 450 10e3/uL   Comprehensive metabolic panel    Collection Time: 08/18/22 10:33 AM   Result Value Ref Range    Sodium 143 133 - 144 mmol/L    Potassium 4.7 3.4 - 5.3 mmol/L    Chloride 97 94 - 109 mmol/L    Carbon Dioxide (CO2) 44 (H) 20 - 32 mmol/L    Anion Gap 2 (L) 3 - 14 mmol/L    Urea Nitrogen 74 (H) 7 - 30 mg/dL    Creatinine 1.56 (H) 0.52 - 1.04 mg/dL    Calcium 9.0 8.5 - 10.1 mg/dL    Glucose 111 (H) 70 - 99 mg/dL    Alkaline Phosphatase 193 (H) 40 - 150 U/L    AST 16 0 - 45 U/L    ALT 41 0 - 50 U/L    Protein Total 5.9 (L) 6.8 - 8.8 g/dL    Albumin 2.3 (L) 3.4 - 5.0 g/dL    Bilirubin Total 0.3 0.2 - 1.3 mg/dL    GFR Estimate 38 (L) >60 mL/min/1.73m2   General PFT Lab (Please always keep checked)    Collection Time: 08/18/22 10:41 AM   Result Value Ref Range    FVC-Pred 3.06 L    FVC-Pre 1.33 L    FVC-%Pred-Pre 43 %    FEV1-Pre 1.22 L    FEV1-%Pred-Pre 50 %    FEV1FVC-Pred 79 %    FEV1FVC-Pre 92 %    FEFMax-Pred 6.14 L/sec    FEFMax-Pre 3.79 L/sec    FEFMax-%Pred-Pre 61 %    FEF2575-Pred 2.22 L/sec    FEF2575-Pre 2.04 L/sec    KZO6417-%Pred-Pre 91 %    ExpTime-Pre 4.94 sec    FIFMax-Pre 2.55 L/sec    FEV1FEV6-Pred 81 %    FEV1FEV6-Pre 95 %                 Results as noted  above.    PFT Interpretation:  Severe restrictive ventilatory defect.  Increased from previous.  First FEV1 since lung transplantation  Valid Maneuver      CXR (8/18/2022), personally reviewed by me: The Lung fields are clear. Left effusion is stable. Cardiac silhouette is wnl.     Assessment and plan: Sofie Rodriguez is a 62 year old female with a PMH significant for end-stage COPD, HTN, HFpEF, Mycobacterium peregrinum colonization, h/o hepatitis C, HECTOR s/p LEEP procedure, and former methamphetamine use.  S/p BSLT on 6/28/22 (lungs slightly undersized), but with persistent hypercapnia. Post-operative course complicated by bilateral pleural effusions, left radial artery thrombus (presumed secondary to arterial line) s/p thrombectomy 7/2, BACILIO, C diff, gastroparesis s/p GJ tube placement in IR 7/27, s/p EGD 8/3 with pyloric ulcer noted, melena with hgb drop (8/8), elevated LFTs (8/8) with extrahepatic mass on US and MRCP with increase in biliary dilation.  S/p ERCP 8/11 with findings concerning for hemobilia, but no bleeding from ulcer in pyloric channel.    #. 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    - Interestingly has persistent hypercapnia without dyspnea, appears to be a respiratory drive problem.  Sniff (7/14) notable for right hemidiaphragm palsy. NIPPV initially overnight for persistent hypercapnia, stopped 8/3 given lack of improvement with therapy, compensated hypercapnia persists.  - Bronch (8/2) with normal inspection of anastomoses.    -   CTA abdomen (8/11) noted similar appearance of bilateral loculated moderate pleural effusions with adjacent compressive atelectasis and LL predominant interlobular septal  thickening.    - S/p right diagnostic thoracentesis (8/12) with 100 ml removed.  CXR (8/13) demonstrates small right effusion, improved loculated left effusion.  Continues to use 1L NC for comfort without evidence of hypoxia.      Immunosuppression:  Induction therapy with basiliximab (and high dose IV steroid).  - Tacrolimus 4 mg BID (via J tube).  Goal level 8-12.  -  mg BID (increased 8/7, prior decrease for GI symptoms/leukopenia)   - Prednisone next taper due 8/18 (not yet ordered)  Date AM dose (mg) PM dose (mg)   8/18/22 10 10   9/15/22 10 7.5   10/13/22 7.5 7.5   11/10/22 7.5 5   12/8/22 5 5   1/5/23 5 2.5      8/18/22: Pulm sx are stable. Still using Oxygen 1 - 2lpm NC at all times. Once home PT/OT is initiated we will get a good assessment of O2 needs and then consider checking overnight oximetry (if there is no overnight exertional hypoxia).   - CXR 8/18 with stable left pleural effusions (personally reviewed)  - Aerobika and incentive spirometry hourly while awake  - FEV1 is lower than expected most likely due to Rt. diaphragm weakness and left effusion. We will proceed with diagnostic/therapeutic tap of left effusion to see if this helps. The Effusion has been stable over the last one to two weeks. it is likely loculated.  - Surveillance bronch one month from now, hopefully she will be in a better position physically to tolerate it.    #. Positive DSA: Newly positive DSA on 8/10, DQB2 with mfi 2155.   - Repeat DSA pending (8/15) was 3582. Will repeat in 10days.    #. ID:  Prophylaxis:   - Dapsone qMWF for PJP ppx  - VGCV for CMV ppx, CMV monthly (negative 8/8)  - Nystatin for oral candidiasis ppx, 6 month course  - See below for serologies and viral ppx:    Donor Recipient Intervention   CMV status Positive Positive Valganciclovir POD #8-90   EBV status Positive Positive EBV monitoring as below   HSV status N/A Positive Not indicated        Donor bronch cultures (OSH) with Strep beta  hemolytic (not group A).  - Pleural fluid cultures (8/12) NGTD     H/o M. peregrinum colonization: NGTD post-transplant.  - AFB to be sent on all future bronchs     Streptococcus pneumoniae:  Stenotrophomonas maltophilia: Noted in recipient cultures at time of transplant.  S/p ceftazidime 6/28-7/10, vancomycin 7/7-7/8 and 6/28-6/30, and levofloxacin 7/10-7/12 for total 2 week ABX course.    H/o hepatitis C:  Diagnosed in 1980s s/p 2m treatment, quant negative since 10/2017, last positive 2/20/17 ([phone]).  Ab positive on 6/2021 with negative HCV PCR.  H/o remote EtOH abuse.  MR elastography (4/27/21) with hepatology review and consult without any concerns post transplant.  Hepatitis C RNA negative and hepatitis C antibody positive (old) on 6/28.  Repeat hepatitis C RNA negative 8/8 in setting of elevated LFTs.     EBV viremia: EBV level 11k, log 4.1 on 7/28.  Not likely to be clinically significant.  Repeat EBV (in the setting of elevated LFTs) decreased to 1,501, log 3.2 (8/8).  - EBV monthly monitoring due 9/8    C diff infection: Abdominal pain with diarrhea noted 7/8, C diff positive 7/11. S/p PO vancomycin (7/11-7/28).  Repeat C diff negative 8/6.  Low threshold to resume vancomycin in the future with ABX course.  Loose stools (on tube feeds) stable.     #. Hypogammaglobulinemia: IgG adequate at time of transplant, repeat level low (336) on 7/28.  S/p IVIG dosing with premedication 7/30, tolerated well.  IgG on 8/10 low but stable at 590, replacement not indicated.  - Repeat IgG due 8/30    #. Cardiology:  A flutter with RVR/SVT: SVT first noted on 7/14, prior to HD line placement, continues intermittently.  Aflutter with RVR to 200 7/17 triggered by activity.  EP consulted 7/17 given persistent tachycardia/dysrhythmia, midodrine discontinued.  AC deferred per EP given bleeding risk and despite CHADSVASc of 2.  .  8/18/2022: Ziopatch ordered and follow up with EP in 1 - 2 months. The Ziopatch fell off, will  replace it.  - On metoprolol    H/o HTN:  H/o HFpEF:  Had vasoplegia post operatively. Last echo 7/7/22 normal EF with good LV function. S/p hydrocortisone 7/6-7/9 and fludrocortisone 7/6-7/11 without significant improvement.  - off midodrine 7/19    #. Left hand ischemia: Radial artery thrombosis identified on duplex doppler s/p thrombectomy on 7/2.  - Repeat LUE arterial US 8/15/22 with dopplerable flow   - Peripheral US (BUE and BLE) to screen for thrombus prior to discharge (8/15/22) - negative    #. BACILIO:   Hyperkalemia: Likely multifactorial including medications (Bactrim, tacrolimus) and hypotension.  iHD 7/14-7/16.  Creatinine increased since 7/29 with Diamox and elevated tacrolimus level.  Potassium had been stable, elevated intermittently since 8/8.  Transitioned to low potassium TF formula 8/8 although intermittently held d/t GI symptoms  - Tacrolimus monitoring as above  - Florinef (started 8/9, increased 8/12)    Hypomagnesemia: Suppressed absorption d/t CNI.  Continue daily magnesium with replacement protocol prn.    #. Elevated LFTs:   Extrahepatic and intrahepatic biliary dilation: Acute rise in LFTs (alk phos 861, , AST 1,143 and bilirubin 0.9) on 8/8 (prior normal) associated with increased and different abdominal pain as below.  Amylase low, lipase normal.  Abdominal US (8/8) with extrahepatic mass at level of common bile duct with associated intrahepatic and common bile duct dilation, patent hepatic vasculature, and layering gallbladder sludge.  MRCP (8/9) with slight increase in moderate biliary dilation and gall bladder with moderate protein/hemorrhagic material. S/p ERCP (8/11) with findings concerning for hemobilia, stent placed across duodenum and in CBD.  CTA abdomen (8/11) without evidence of acute GI bleed.  GI team concerned bleeding originating from gallbladder. Hepatic panel stable/improving.    8/18/2022: LFT's improving slowly.  - Repeat abdominal CT in 6 months for pancreatic  findings (~2/2023)  - Management per primary team with consult by Panc/Bili, General Surgery, and Surg Onc  - Repeat ERCP with Dr. Arroyo in 2 months, ~ 10/16/22.    #. Nutrition: On Novasource renal via Jtube along with all meds.  Current regimen: Novasource Renal @ 35 ml/hr (840 ml) + 1 pkt Prosource. She can try taking Yogurt, jello or icecream as tolerated. She notices pain if taken fast.    #. IPMN: MRCP (8/9/22) showed cystic foci in the pancreatic head (most consistent with IPMN). The Cystic foci in the pancreatic head measuring 4 x 3 mm superiorly and 4 x 3 mm inferiorly.   Per [facility] guidelines on IPMN:  - Follow up imaging in 6 months to 1 year, then lengthen interval to 2-3 years if no change    #. Severe gastroparesis:   Pyloric ulcer: Cramping abdominal pain 7/15. CT abdomen (7/15) with moderate to large gastric distention, otherwise without obstruction.  NG placed for LIS with moderate to large output for several days, did not tolerate clamping.  Gastric emptying study (7/20) with severe gastric emptying delay (95% retention at 4 hours), s/p GJ tube placement in IR 7/27.   - S/p EGD 8/3 for coffee ground G tube output, noted to have pyloric ulcer and excessive gastric fluid and residual food.  Increased abdominal pain/cramping with trial of cycled TF 8/7 to 8/8. Hemoglobin drop with dark tarry stools 8/8.  S/p repeat EGD (8/11) with mild erythema 2/2 GJ tube trauma seen near insertion site but no active bleeding.  8/18/2022:   - PPI BID  - Simethicone prn  - TF and ALL medications via J tube  - G tube to gravity drainage; full liquid diet for comfort only (recommend minimal intake d/t need for increased pain meds with increased PO)  - Repeat EGD in 8 weeks (~10/6) to check healing of ulcer    #. Post op pain management: On Oxycodone 5 - 10mg Q4hr prn. Will add tylenol 650mg prn.    #. Anemia: Will monitor. Last PRBC on 8/9/22. Most likely due to chronic illness and BM suppression from meds. MCV elevated  due to MMF.    #. Stress-induced/steroid induced hyperglycemia with intermittent hypoglycemia:  8/18/2022:   - Controlled on 7 units of glargine BID previously  - sliding scale insulin, adjust as needed  - testing 4 times a day is needed due to insulin dependence due to hyperglycemia caused by steroids    #. HCM:   - uptodate on C19 vaccination x 4   - Due for Evusheld at next visit.    Harshad Gong MD.  Pulmonary and Critical Care.  08/18/2022  1:06 PM

## 2022-08-18 ENCOUNTER — OFFICE VISIT (OUTPATIENT)
Dept: TRANSPLANT | Facility: CLINIC | Age: 62
End: 2022-08-18
Attending: INTERNAL MEDICINE
Payer: MEDICARE

## 2022-08-18 ENCOUNTER — TELEPHONE (OUTPATIENT)
Dept: TRANSPLANT | Facility: CLINIC | Age: 62
End: 2022-08-18

## 2022-08-18 ENCOUNTER — ANCILLARY PROCEDURE (OUTPATIENT)
Dept: GENERAL RADIOLOGY | Facility: CLINIC | Age: 62
End: 2022-08-18
Attending: INTERNAL MEDICINE
Payer: MEDICARE

## 2022-08-18 ENCOUNTER — MEDICAL CORRESPONDENCE (OUTPATIENT)
Dept: HEALTH INFORMATION MANAGEMENT | Facility: CLINIC | Age: 62
End: 2022-08-18

## 2022-08-18 ENCOUNTER — LAB (OUTPATIENT)
Dept: LAB | Facility: CLINIC | Age: 62
End: 2022-08-18
Payer: MEDICARE

## 2022-08-18 VITALS
OXYGEN SATURATION: 99 % | BODY MASS INDEX: 28.17 KG/M2 | DIASTOLIC BLOOD PRESSURE: 68 MMHG | SYSTOLIC BLOOD PRESSURE: 119 MMHG | WEIGHT: 154 LBS | HEART RATE: 101 BPM

## 2022-08-18 DIAGNOSIS — Z94.2 LUNG REPLACED BY TRANSPLANT (H): ICD-10-CM

## 2022-08-18 DIAGNOSIS — Z94.2 LUNG REPLACED BY TRANSPLANT (H): Primary | ICD-10-CM

## 2022-08-18 DIAGNOSIS — Z00.6 RESEARCH STUDY PATIENT: ICD-10-CM

## 2022-08-18 DIAGNOSIS — R52 GENERALIZED PAIN: Primary | ICD-10-CM

## 2022-08-18 DIAGNOSIS — Z79.899 ENCOUNTER FOR LONG-TERM (CURRENT) USE OF HIGH-RISK MEDICATION: ICD-10-CM

## 2022-08-18 LAB
ALBUMIN SERPL-MCNC: 2.3 G/DL (ref 3.4–5)
ALP SERPL-CCNC: 193 U/L (ref 40–150)
ALT SERPL W P-5'-P-CCNC: 41 U/L (ref 0–50)
ANION GAP SERPL CALCULATED.3IONS-SCNC: 2 MMOL/L (ref 3–14)
AST SERPL W P-5'-P-CCNC: 16 U/L (ref 0–45)
BILIRUB SERPL-MCNC: 0.3 MG/DL (ref 0.2–1.3)
BUN SERPL-MCNC: 74 MG/DL (ref 7–30)
CALCIUM SERPL-MCNC: 9 MG/DL (ref 8.5–10.1)
CHLORIDE BLD-SCNC: 97 MMOL/L (ref 94–109)
CMV DNA SPEC NAA+PROBE-ACNC: NOT DETECTED IU/ML
CO2 SERPL-SCNC: 44 MMOL/L (ref 20–32)
CREAT SERPL-MCNC: 1.56 MG/DL (ref 0.52–1.04)
ERYTHROCYTE [DISTWIDTH] IN BLOOD BY AUTOMATED COUNT: 16.7 % (ref 10–15)
EXPTIME-PRE: 4.94 SEC
FEF2575-%PRED-PRE: 91 %
FEF2575-PRE: 2.04 L/SEC
FEF2575-PRED: 2.22 L/SEC
FEFMAX-%PRED-PRE: 61 %
FEFMAX-PRE: 3.79 L/SEC
FEFMAX-PRED: 6.14 L/SEC
FEV1-%PRED-PRE: 50 %
FEV1-PRE: 1.22 L
FEV1FEV6-PRE: 95 %
FEV1FEV6-PRED: 81 %
FEV1FVC-PRE: 92 %
FEV1FVC-PRED: 79 %
FIFMAX-PRE: 2.55 L/SEC
FVC-%PRED-PRE: 43 %
FVC-PRE: 1.33 L
FVC-PRED: 3.06 L
GFR SERPL CREATININE-BSD FRML MDRD: 38 ML/MIN/1.73M2
GLUCOSE BLD-MCNC: 111 MG/DL (ref 70–99)
HCT VFR BLD AUTO: 28.3 % (ref 35–47)
HGB BLD-MCNC: 8.6 G/DL (ref 11.7–15.7)
MAGNESIUM SERPL-MCNC: 2.6 MG/DL (ref 1.6–2.3)
MCH RBC QN AUTO: 30.8 PG (ref 26.5–33)
MCHC RBC AUTO-ENTMCNC: 30.4 G/DL (ref 31.5–36.5)
MCV RBC AUTO: 101 FL (ref 78–100)
PLATELET # BLD AUTO: 387 10E3/UL (ref 150–450)
POTASSIUM BLD-SCNC: 4.7 MMOL/L (ref 3.4–5.3)
PROT SERPL-MCNC: 5.9 G/DL (ref 6.8–8.8)
RBC # BLD AUTO: 2.79 10E6/UL (ref 3.8–5.2)
SODIUM SERPL-SCNC: 143 MMOL/L (ref 133–144)
TACROLIMUS BLD-MCNC: 7.6 UG/L (ref 5–15)
TME LAST DOSE: NORMAL H
TME LAST DOSE: NORMAL H
WBC # BLD AUTO: 8.7 10E3/UL (ref 4–11)

## 2022-08-18 PROCEDURE — G0463 HOSPITAL OUTPT CLINIC VISIT: HCPCS

## 2022-08-18 PROCEDURE — 80197 ASSAY OF TACROLIMUS: CPT | Performed by: INTERNAL MEDICINE

## 2022-08-18 PROCEDURE — 83735 ASSAY OF MAGNESIUM: CPT | Performed by: PATHOLOGY

## 2022-08-18 PROCEDURE — 85027 COMPLETE CBC AUTOMATED: CPT | Performed by: PATHOLOGY

## 2022-08-18 PROCEDURE — 99214 OFFICE O/P EST MOD 30 MIN: CPT | Mod: 24 | Performed by: INTERNAL MEDICINE

## 2022-08-18 PROCEDURE — 94375 RESPIRATORY FLOW VOLUME LOOP: CPT | Performed by: INTERNAL MEDICINE

## 2022-08-18 PROCEDURE — 71046 X-RAY EXAM CHEST 2 VIEWS: CPT | Mod: GC | Performed by: RADIOLOGY

## 2022-08-18 PROCEDURE — 36415 COLL VENOUS BLD VENIPUNCTURE: CPT | Performed by: PATHOLOGY

## 2022-08-18 PROCEDURE — 80053 COMPREHEN METABOLIC PANEL: CPT | Performed by: PATHOLOGY

## 2022-08-18 RX ORDER — ACETAMINOPHEN 160 MG/5ML
650 LIQUID ORAL EVERY 6 HOURS PRN
Qty: 1200 ML | Refills: 3 | Status: SHIPPED | OUTPATIENT
Start: 2022-08-18 | End: 2022-08-24

## 2022-08-18 ASSESSMENT — PAIN SCALES - GENERAL: PAINLEVEL: NO PAIN (0)

## 2022-08-18 NOTE — LETTER
8/18/2022         RE: Sofie Rodriguez  [address] [place] [address]        Dear Colleague,    Thank you for referring your patient, Sofie Rodriguez, to the [facility]. Please see a copy of my visit note below.    Reason for Visit  Sofie Rodriguez is a 62 year old female who is being seen for RECHECK (S/p lung tx)    Lung TX HPI  The patient was seen and examined by Harshad Gong MD     Sofie Rodriguez is a 62 year old female with a PMH significant for end-stage COPD, HTN, HFpEF, Mycobacterium peregrinum colonization, h/o hepatitis C, HECTOR s/p LEEP procedure, and former methamphetamine use.  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  Post-operative course complicated by bilateral pleural effusions, left radial artery thrombus (presumed secondary to arterial line) s/p thrombectomy 7/2, BACILIO, C diff, gastroparesis s/p GJ tube placement in IR 7/27, coffee ground G tube output s/p EGD 8/3 with pyloric ulcer noted, melena with hgb drop (8/8), elevated LFTs (8/8) with extrahepatic mass on US and MRCP with increase in biliary dilation.  S/p ERCP 8/11 with findings concerning for hemobilia, but no bleeding from ulcer in pyloric channel.  She was discharged on 8/17/22.     Today is her first clinic visit after lung transplantation.    Interval history:   She was discharged yesterday.  In general she overall feels just tired from all the activity from discharge and come to the clinic today.  She denies having any shortness of breath does have some chest pains around the surgical sites no wheezing she did.  She does have some cough with mild phlegm production.  Is mostly clear.  Her sleep was fair mostly because this is a new bed and a new place to sleep in.    Her major issue today seems to be related to her stomach symptoms.  She continues to have stomach upset/nausea on and off  throughout the day.  It is especially worse after taking all her meds in the mall morning.  She has not had any emesis since yesterday.  She is has about 2-3 loose stools per day.  She in fact felt urgency after taking her meds today which led to incontinence and felt like she saw some tablet fragments in the stools 2.    She continues to have pain in the upper abdomen and incisional pain for which she takes oxycodone about 2 or 3 times a day.    Current Outpatient Medications   Medication     alcohol swab prep pads     aspirin (ASA) 81 MG EC tablet     blood glucose (CONTOUR NEXT TEST) test strip     blood glucose (NO BRAND SPECIFIED) lancets standard     blood glucose monitoring (CONTOUR NEXT MONITOR W/DEVICE KIT) meter device kit     calcium carbonate 600 mg-vitamin D 400 units (CALTRATE) 600-400 MG-UNIT per tablet     CELLCEPT (BRAND) 200 MG/ML suspension     dapsone (ACZONE) 25 MG tablet     fludrocortisone (FLORINEF) 0.1 MG tablet     insulin aspart (NOVOLOG PEN) 100 UNIT/ML pen     insulin glargine (LANTUS PEN) 100 UNIT/ML pen     insulin pen needle (31G X 5 MM) 31G X 5 MM miscellaneous     metoprolol tartrate (LOPRESSOR) 50 MG tablet     Multiple Vitamins-Minerals (MULTIVITAMINS W/MINERALS) liquid     mycophenolate (GENERIC EQUIVALENT) 200 MG/ML suspension     nystatin (MYCOSTATIN) 218170 UNIT/ML suspension     ondansetron (ZOFRAN ODT) 4 MG ODT tab     oxyCODONE (ROXICODONE) 5 MG tablet     pantoprazole (PROTONIX) 2 mg/mL SUSP suspension     predniSONE (DELTASONE) 5 MG tablet     protein modular (PROSOURCE TF) LIQD     Sharps Container MISC     simethicone (MYLICON) 40 MG/0.6ML suspension     tacrolimus (GENERIC EQUIVALENT) 1 mg/mL suspension     valGANciclovir (VALCYTE) 50 MG/ML solution     No current facility-administered medications for this visit.     Allergies   Allergen Reactions     Blood Transfusion Related (Informational Only) Other (See Comments)     Patient has a history of a clinically  significant antibody against RBC antigens.  A delay in compatible RBCs may occur.      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     Hepatitis 2017    Hep C, [facility]     HTN (hypertension)      Osteopenia        Past Surgical History:   Procedure Laterality Date     BRONCHOSCOPY (RIGID OR FLEXIBLE), DIAGNOSTIC N/A 8/2/2022    Procedure: BRONCHOSCOPY, DIAGNOSTIC- inspection Bronch;  Surgeon: Kamala Lovell MD;  Location:  GI     BRONCHOSCOPY FLEXIBLE AND RIGID N/A 07/19/2022    Procedure: BRONCHOSCOPY inspection only;  Surgeon: Bob Liao MD;  Location:  GI     COLONOSCOPY  2015     CV CORONARY ANGIOGRAM N/A 06/30/2021    Procedure: CV CORONARY ANGIOGRAM;  Surgeon: Alexander Cuellar MD;  Location:  HEART CARDIAC CATH LAB     CV RIGHT HEART CATH MEASUREMENTS RECORDED N/A 06/30/2021    Procedure: CV RIGHT HEART CATH;  Surgeon: Alexander Cuellar MD;  Location:  HEART CARDIAC CATH LAB     ENDOSCOPIC RETROGRADE CHOLANGIOPANCREATOGRAM N/A 8/11/2022    Procedure: ENDOSCOPIC RETROGRADE CHOLANGIOPANCREATOGRAPHY WITH PANCREATIC DUCT NEEDLE KNIFE AND STENT PLACEMENT, BILE DUCT SPHINCTEROTOMY, BLOOD/DEBRIS REMOVAL AND STENT PLACEMENT;  Surgeon: Cosmo Arroyo MD;  Location:  OR     ENT SURGERY  1974    tonsillectomy     ENTEROSCOPY SMALL BOWEL N/A 8/11/2022    Procedure: SMALL BOWEL ENTEROSCOPY;  Surgeon: Cosmo Arroyo MD;  Location:  OR     ESOPHAGOGASTRODUODENOSCOPY, WITH NASOGASTRIC TUBE INSERTION N/A 07/01/2022    Procedure: ESOPHAGOGASTRODUODENOSCOPY, WITH NASOJEJUNAL TUBE INSERTION;  Surgeon: Ozzy Nickerson MD;  Location:  GI     ESOPHAGOSCOPY, GASTROSCOPY, DUODENOSCOPY (EGD), COMBINED N/A 8/3/2022    Procedure: ESOPHAGOGASTRODUODENOSCOPY (EGD);  Surgeon: Ira Andres MD;  Location:   GI     HAND SURGERY       LEEP TX, CERVICAL  2017    HECTOR III     LYMPH NODE BIOPSY Left     Left axilla, benign- [place]     MIDLINE INSERTION - DOUBLE LUMEN Left 2022    20cm, Basilic vein     THROMBECTOMY UPPER EXTREMITY Left 2022    Procedure: LEFT RADIAL ARM THROMBECTOMY;  Surgeon: Christie Graham MD;  Location: [facility] OR     TRANSPLANT LUNG RECIPIENT SINGLE X2 Bilateral 2022    Procedure: Clamshell Incision, Bilateral Sequential Lung Transplant, On Cardiopulmonary Bypass, Flexible Bronchoscopy;  Surgeon: Sue Sunshine MD;  Location: [facility] OR       Social History     Socioeconomic History     Marital status: Single     Spouse name: Not on file     Number of children: Not on file     Years of education: Not on file     Highest education level: Not on file   Occupational History     Not on file   Tobacco Use     Smoking status: Former Smoker     Years: 30.00     Types: Cigarettes     Quit date: 2020     Years since quittin.7     Smokeless tobacco: Never Used   Substance and Sexual Activity     Alcohol use: Not Currently     Drug use: Not Currently     Types: Marijuana, Methamphetamines     Comment: hx:marijuana and methamphetamine-quit both unsure ?  2-3 yrs ago     Sexual activity: Not on file   Other Topics Concern     Parent/sibling w/ CABG, MI or angioplasty before 65F 55M? Not Asked   Social History Narrative     Not on file     Social Determinants of Health     Financial Resource Strain: Not on file   Food Insecurity: Not on file   Transportation Needs: Not on file   Physical Activity: Not on file   Stress: Not on file   Social Connections: Not on file   Intimate Partner Violence: Not on file   Housing Stability: Not on file       ROS Pulmonary  Constitutional- Negative  Eyes- Negative  Ear, nose and throat- Negative  Cardiac- Negative  Pulm- See HPI  GI- Negative  Genitourinary- Negative  Musculoskeletal- Negative  Neurology- Negative  Dermatology-  Negative  Endocrine- Negative  Lymphatic- Negative  Psychiatry- Negative    A complete ROS was otherwise negative except as noted in the HPI.  /68 (BP Location: Right arm, Patient Position: Sitting, Cuff Size: Adult Regular)   Pulse 101   Wt 69.9 kg (154 lb)   SpO2 99%   BMI 28.17 kg/m     Exam:   GENERAL APPEARANCE: Well developed, well nourished, alert, and in no apparent distress.  EYES: PERRL, EOMI  HENT: Nasal mucosa with no edema and no hyperemia. No nasal polyps.  EARS: Canals clear, TMs normal  MOUTH: Oral mucosa is moist, without any lesions, no tonsillar enlargement, no oropharyngeal exudate.  NECK: supple, no masses, no thyromegaly.  LYMPHATICS: No significant axillary, cervical, or supraclavicular nodes.  RESP: normal percussion, Diminished air flow in the left base.  No crackles. No wheezes.  CV: Normal S1, S2, regular rhythm, normal rate. No murmur.  No rub. No gallop. No LE edema.   ABDOMEN:  Bowel sounds normal, soft, Rt and left UQ tenderness, no HSM or masses. GJtube in place.  MS: extremities normal. No clubbing. No cyanosis.  SKIN: no rash on limited exam  NEURO: Mentation intact, speech normal, normal strength and tone, normal gait and stance  PSYCH: mentation appears normal. and affect normal/bright.    Results:  Recent Results (from the past 168 hour(s))   ENDOSCOPIC RETROGRADE CHOLANGIOPANCREATOGRAPHY    Collection Time: 08/11/22  1:11 PM   Result Value Ref Range    ERCP       [facility]  [address] [place] [address] [phone]     Endoscopy Department  _______________________________________________________________________________  Patient Name: Sofie Rodriguez            Procedure Date: 8/11/2022 1:11 PM  MRN: [id number]                       Account Number: [id number]  YOB: 1960               Admit Type: Inpatient  Age: 62                               Room:  OR   Gender: Female                        Note Status:  Finalized  Attending MD: MERY FINE MD  Total Sedation Time:   _______________________________________________________________________________     Procedure:             ERCP  Indications:           Sofie Nichols a 62 year old female with a PMHx of                          COPD s/p bilateral lung transplant (6/28/22)                          complicated by acute limb ischemia of LUE requiring                          left radial thrombectomy, EBV viremia and C.diff                           (vancomycin 7/12/22-7/28/22). 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 She                          started having melena with an associated hemoglobin                          drop to 6.5 (baseline 7-8).She is also noted to have                          an abdominal pain with an associated elevated                          LFTs.MRCP shows mild dilation of intrahepatic and                          extrahepatic biliary ducts and gallbladder with sludge                          extending to the bladder neck. Normal TB (0.2).                          Elevated alk phos (412) and ALT (215). She presents                          for an EGD for evaluation of acute blood loss anemia                           in the setting of PUDs and an ERCP for evaluation of                          possible. Hemoglobin 7.9, platelets 322, INR 0.95, Cr                          1.50.?  Providers:             MERY FINE MD, ABDULFATAH ISSAK, Jennifer Vanderheyden  Referring MD:          AMADOR BROWN MD  Requesting Provider:   VALERIE DOWELL MD  Medicines:             General Anesthesia, Levaquin 500 mg  IV  Complications:         No immediate complications.  _______________________________________________________________________________  Procedure:             Pre-Anesthesia Assessment:                         - Prior to the procedure, a History and Physical was                          performed, and patient medications and allergies were                          reviewed. The patient is competent. The risks and                          benefits of the procedure and the sedation options and                          risks were discussed with the patient.  All questions                          were answered and informed consent was obtained.                          Patient identification and proposed procedure were                          verified by the physician, the nurse and the                          anesthesiologist in the procedure room. Mental Status                          Examination: alert and oriented. Airway Examination:                          normal oropharyngeal airway and neck mobility.                          Respiratory Examination: clear to auscultation. CV                          Examination: RRR, no murmurs, no S3 or S4.                          Prophylactic Antibiotics: The patient requires                          prophylactic antibiotics. Prior Anticoagulants: The                          patient has taken no anticoagulant or antiplatelet                          agents. ASA Grade Assessment: III - A patient with                          severe systemic disease. After reviewing the risks and                           benefits, the patient was deemed in satisfactory                          condition to undergo the procedure. The anesthesia                          plan was to use general anesthesia. Immediately prior                          to administration of medications, the patient was                          re-assessed for adequacy to receive sedatives. The                           heart rate, respiratory rate, oxygen saturations,                          blood pressure, adequacy of pulmonary ventilation, and                          response to care were monitored throughout the                          procedure. The physical status of the patient was                          re-assessed after the procedure.                         After obtaining informed consent, the scope was passed                          under direct vision. Throughout the procedure, the                          patient's blood pressure, pulse, and oxygen                          saturations were monit ored continuously. The                          Duodenoscope was introduced through the mouth, and                          used to inject contrast into and used to inject                          contrast into the bile duct and ventral pancreatic                          duct. The ERCP was technically difficult and complex                          due to challenging cannulation because of inability to                          visualize the major papilla. The patient tolerated the                          procedure well.                                                                                   Findings:       Patient underwent an EGD prior to the ERCP given recent history of acute        blood loss anemia. PEG-J was seen in the expected location of the        stomach going through the pyloric channel into the duodenum. Mild        erythema secondary to PEG-J trauma seen near the PEG-J insertion site        but no active bleeding. The PEG-J was pulled out partially from the         small bowel and pyloric channel into the gastric body to facilitate        scope passage into the duodenum. Hematin mixed with bile is seen in the        second and third portion of the duodenum but no definite source of        active bleeding. A pediatric colonoscope was inserted and advanced to        the proximal jejunum and  showed hematin mixed with bile in the second        and third portion of the duodenum but no source of active bleeding (AVMs        vs Dieulafoy lesions vs ulceration). Finding was concerning for        hemobilia and decision was made to proceed with the ERCP.       The  film was normal. The esophagus was successfully intubated        under direct vision. The scope was advanced to a normal major papilla in        the descending duodenum without detailed examination of the pharynx,        larynx and associated structures, and upper GI tract. The upper GI tract        was grossly normal. The major papilla was congested. The ventral        pancreatic duct w as deeply cannulated with the short-nosed traction        sphincterotome. One 4 Fr by 11 cm temporary Arroyo pancreatic flex        stent with a single external pigtail and no internal flaps was placed 8        cm into the ventral pancreatic duct. Clear fluid flowed through the        stent. The stent was in good position. It was difficulty to succussfully        cannulate the biliary duct. A needle knife precut sphincterotomy was        performed and this enabled successful cannulation of the common bile        duct. A 0.025 inch x 270 cm angled Visiglide wire passed successfully        into the left main hepatic duct. A significant amount of blood mixed        with bile came pouring from the common bile duct. This is believed to be        the patient's source of acute blood loss anemia. Contrast was injected.        I personally interpreted the bile duct images. Ductal flow of contrast        was adequate. Image quality was excellent. Contrast extended to the        entire biliary t ree. No significant filling defect or strictures seen on        contrast study. Biliary sphincterotomy was made with a monofilament        traction (standard) sphincterotome using ERBE electrocautery. There was        no post-sphincterotomy bleeding. To discover objects, the  biliary tree        was swept with a 12 mm balloon starting at the bifurcation. Old blood        and minimal sludge was swept from the duct. One [place]aver Viabil [id number]        temporary stent was placed 4 cm into the common bile duct. Bile flowed        through the stent. The stent was in good position.       The prevoiusly placed gastrojejujunal feeding tube was removed to        complete uppper procedures and was replaced at the end of the procedure.        A pediatric upper endoscope was advanced through gastrostomy tract to        the proximal jejunum. A 0.035 inch Glide wire was advanced into the        jejunum and the endoscope removed. An 18 Fr by 53 cm gastro-jejunal        feeding tube was advanced into the je junum over the wire and position        confirmed fluoroscopically.       CODE MODIFIER 22, complex procedure in three phases.                                                                                   Impression:            - Fresh blood in second thrid portion of duodenum, no                          mucosal lesion seen despite EGD, enteroscopy and                          duodenoscopy.                         - After biliary access, claer that source of bleed was                          hemobilia.                         - Stenotic major papilla, access by pancreatic stent                          protected needle knife precut.extension of biliary                          sphincterotomy was then extended with an monofilement                          wire                         - Heme mixed with blood concerning for hemobilia seen                          coming from the biliary ducts                         - Balloon sweep extracted more old blood and some                           sludge                         - No focal stricture to suggest intraductal tumor, and                          source of hemobilia unclear, may be gallbladder as                          suggested by CT                          - A transpabillary Maceo Viabil [id number] temporary stent                          was placed into the common bile duct to tamponade any                          bleeding and allow passage of clots.                         - One 4 Fr by 11 cm temporary Cruz pancreatic flex                          stent was placed for prophylaxis  Recommendation:        - Return patient to the hospital [facility] for ongoing care                         - Obtain a CTA tonight to rule out ongoing hemobilia                          and if evidence of bleeding, notify GI call team and                          consult IR                         - Admit patient to a higher acuity floor                         - Monitor H/H overnight and transfuse as appropriate                          - I called patient's son (Gera Rodriguez), however,                          went to voicemail                         - Discussed recommendations with Dr. Gama who is                          covering the patient                                                                                     Electronically signed by RAJ Arroyo  ________________________  MERY ARROYO MD  8/17/2022 9:40:08 AM  I was physically present for the entire viewing portion of the exam.  __________________________  Signature of teaching physician  Shelby/[id number] ARROYO MD    __________________  ROLANDO RENAE,   Number of Addenda: 0    Note Initiated On: 8/11/2022 1:11 PM  Scope In:  Scope Out:     Glucose by meter    Collection Time: 08/11/22  4:52 PM   Result Value Ref Range    GLUCOSE BY METER POCT 237 (H) 70 - 99 mg/dL   CBC with platelets and differential    Collection Time: 08/11/22  4:57 PM   Result Value Ref Range    WBC Count 10.2 4.0 - 11.0 10e3/uL    RBC Count 2.76 (L) 3.80 - 5.20 10e6/uL    Hemoglobin 8.4 (L) 11.7 - 15.7 g/dL    Hematocrit 26.8 (L) 35.0 - 47.0 %    MCV 97 78 - 100 fL    MCH 30.4 26.5 - 33.0 pg    MCHC 31.3 (L) 31.5 - 36.5 g/dL    RDW  16.6 (H) 10.0 - 15.0 %    Platelet Count 342 150 - 450 10e3/uL    % Neutrophils 90 %    % Lymphocytes 2 %    % Monocytes 6 %    % Eosinophils 0 %    % Basophils 0 %    % Immature Granulocytes 2 %    NRBCs per 100 WBC 0 <1 /100    Absolute Neutrophils 9.1 (H) 1.6 - 8.3 10e3/uL    Absolute Lymphocytes 0.2 (L) 0.8 - 5.3 10e3/uL    Absolute Monocytes 0.6 0.0 - 1.3 10e3/uL    Absolute Eosinophils 0.0 0.0 - 0.7 10e3/uL    Absolute Basophils 0.0 0.0 - 0.2 10e3/uL    Absolute Immature Granulocytes 0.2 <=0.4 10e3/uL    Absolute NRBCs 0.0 10e3/uL   INR    Collection Time: 08/11/22  6:10 PM   Result Value Ref Range    INR 0.95 0.85 - 1.15   Glucose by meter    Collection Time: 08/11/22  8:50 PM   Result Value Ref Range    GLUCOSE BY METER POCT 148 (H) 70 - 99 mg/dL   Hemoglobin    Collection Time: 08/11/22 11:39 PM   Result Value Ref Range    Hemoglobin 8.1 (L) 11.7 - 15.7 g/dL   Glucose by meter    Collection Time: 08/12/22 12:44 AM   Result Value Ref Range    GLUCOSE BY METER POCT 116 (H) 70 - 99 mg/dL   Glucose by meter    Collection Time: 08/12/22  4:06 AM   Result Value Ref Range    GLUCOSE BY METER POCT 135 (H) 70 - 99 mg/dL   Tacrolimus by Tandem Mass Spectrometry    Collection Time: 08/12/22  7:36 AM   Result Value Ref Range    Tacrolimus by Tandem Mass Spectrometry 8.2 5.0 - 15.0 ug/L    Tacrolimus Last Dose Date      Tacrolimus Last Dose Time     Comprehensive metabolic panel    Collection Time: 08/12/22  7:36 AM   Result Value Ref Range    Sodium 139 136 - 145 mmol/L    Potassium 5.2 3.4 - 5.3 mmol/L    Creatinine 1.42 (H) 0.51 - 0.95 mg/dL    Urea Nitrogen 42.9 (H) 8.0 - 23.0 mg/dL    Chloride 99 98 - 107 mmol/L    Carbon Dioxide (CO2) 35 (H) 22 - 29 mmol/L    Anion Gap 5 (L) 7 - 15 mmol/L    Glucose 133 (H) 70 - 99 mg/dL    Calcium 8.7 (L) 8.8 - 10.2 mg/dL    Protein Total 5.0 (L) 6.4 - 8.3 g/dL    Albumin 2.6 (L) 3.5 - 5.2 g/dL    Bilirubin Total 0.2 <=1.2 mg/dL    Alkaline Phosphatase 344 (H) 35 - 104 U/L    AST  15 10 - 35 U/L     (H) 10 - 35 U/L    GFR Estimate 42 (L) >60 mL/min/1.73m2   Magnesium    Collection Time: 08/12/22  7:36 AM   Result Value Ref Range    Magnesium 2.1 1.7 - 2.3 mg/dL   Phosphorus    Collection Time: 08/12/22  7:36 AM   Result Value Ref Range    Phosphorus 3.9 2.5 - 4.5 mg/dL   CEA    Collection Time: 08/12/22  7:36 AM   Result Value Ref Range    CEA 1.9 ng/mL   Glucose by meter    Collection Time: 08/12/22  8:35 AM   Result Value Ref Range    GLUCOSE BY METER POCT 129 (H) 70 - 99 mg/dL   CBC with platelets    Collection Time: 08/12/22  9:55 AM   Result Value Ref Range    WBC Count 7.4 4.0 - 11.0 10e3/uL    RBC Count 2.98 (L) 3.80 - 5.20 10e6/uL    Hemoglobin 9.1 (L) 11.7 - 15.7 g/dL    Hematocrit 29.5 (L) 35.0 - 47.0 %    MCV 99 78 - 100 fL    MCH 30.5 26.5 - 33.0 pg    MCHC 30.8 (L) 31.5 - 36.5 g/dL    RDW 16.6 (H) 10.0 - 15.0 %    Platelet Count 344 150 - 450 10e3/uL   Extra Green Top (Lithium Heparin) Tube    Collection Time: 08/12/22  9:55 AM   Result Value Ref Range    Hold Specimen JIC    Lactate Dehydrogenase    Collection Time: 08/12/22  9:55 AM   Result Value Ref Range    Lactate Dehydrogenase 958 (H) 0 - 250 U/L   Protein total    Collection Time: 08/12/22  9:55 AM   Result Value Ref Range    Protein Total 5.6 (L) 6.4 - 8.3 g/dL   Cancer antigen 19-9    Collection Time: 08/12/22  9:55 AM   Result Value Ref Range    Cancer Antigen 19-9 20 <=35 U/mL   Glucose by meter    Collection Time: 08/12/22 11:58 AM   Result Value Ref Range    GLUCOSE BY METER POCT 144 (H) 70 - 99 mg/dL   Pleural fluid Aerobic Bacterial Culture Routine    Collection Time: 08/12/22  3:24 PM    Specimen: Pleural Cavity, Right; Pleural fluid   Result Value Ref Range    Culture No Growth    Glucose fluid    Collection Time: 08/12/22  3:24 PM   Result Value Ref Range    Glucose Fluid Source Pleural Cavity, Right     Glucose fluid 150 mg/dL   Gram stain    Collection Time: 08/12/22  3:24 PM    Specimen: Pleural  Cavity, Right; Pleural fluid   Result Value Ref Range    Gram Stain Result No organisms seen     Gram Stain Result 2+ WBC seen    Lactate dehydrogenase fluid    Collection Time: 08/12/22  3:24 PM   Result Value Ref Range    LD Fluid Source Pleural Cavity, Right     Lactate dehydrogenase fluid 121 U/L   Protein fluid    Collection Time: 08/12/22  3:24 PM   Result Value Ref Range    Protein Fluid Source Pleural Cavity, Right     Protein Total Fluid 2.1 g/dL   Anaerobic bacterial culture    Collection Time: 08/12/22  3:24 PM    Specimen: Pleural Cavity, Right; Pleural fluid   Result Value Ref Range    Culture No anaerobic organisms isolated after 5 days    Cytology, non-gynecologic    Collection Time: 08/12/22  3:24 PM   Result Value Ref Range    Final Diagnosis       Specimen A     Interpretation:      Negative for malignancy     Adequacy:     Satisfactory for evaluation          Clinical Information       The patient is a 62 year old female with 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 Pleural Cavity, Right, :A. Pleural Cavity, Right, , Pleural Fluid:  Received 10.5 ml of cloudy orange fluid, processed as 1 Pap stained Autocyte,  1 Whitlock stained cytospin and one hematoxylin and eosin stained cell block.                  Microscopic Description       Microscopic examination was performed.    A resident/fellow in an ACGME accredited training program was involved in the initial review, preparation, and/or interpretation of this case.  I, as the senior physician, attest that I: (i) reviewed patient clinical records if indicated; (ii) reviewed relevant lab test results; (iii) examined the relevant preparation(s) for the specimen(s); and (iv) agree with the report, diagnosis(es), and interpretation as documented by the resident/fellow and edited/confirmed by me. Reporting resident/fellow: Dorothy Rasheed [id number]      Performing Labs       The technical component of this testing was  completed at [facility]     Fungus Culture, non-blood    Collection Time: 08/12/22  3:24 PM    Specimen: Pleural Cavity, Right; Pleural fluid   Result Value Ref Range    Culture No growth after 5 days    Triglyceride Fluid    Collection Time: 08/12/22  3:24 PM   Result Value Ref Range    Triglyceride Fluid Source Pleural Cavity, Right     Triglyceride Fluid 19 mg/dL   Cell Count Body Fluid    Collection Time: 08/12/22  3:24 PM   Result Value Ref Range    Color Yellow Colorless, Yellow    Clarity Cloudy (A) Clear, Bloody    Total Nucleated Cells 228 /uL    Cell Count Fluid Source Pleural Cavity, Right    Acid-Fast Bacilli Culture and Stain    Collection Time: 08/12/22  3:24 PM    Specimen: Pleural Cavity, Right; Pleural fluid   Result Value Ref Range    Acid Fast Stain No acid fast bacilli seen     Acid Fast Stain No acid fast bacilli seen    Differential Body Fluid    Collection Time: 08/12/22  3:24 PM   Result Value Ref Range    % Neutrophils 39 %    % Lymphocytes 43 %    % Monocyte/Macrophages 18 %   Glucose by meter    Collection Time: 08/12/22  4:01 PM   Result Value Ref Range    GLUCOSE BY METER POCT 174 (H) 70 - 99 mg/dL   INR    Collection Time: 08/12/22  4:57 PM   Result Value Ref Range    INR 0.93 0.85 - 1.15   Partial thromboplastin time    Collection Time: 08/12/22  4:57 PM   Result Value Ref Range    aPTT 25 22 - 38 Seconds   Glucose by meter    Collection Time: 08/12/22  8:03 PM   Result Value Ref Range    GLUCOSE BY METER POCT 132 (H) 70 - 99 mg/dL   Glucose by meter    Collection Time: 08/13/22  8:12 AM   Result Value Ref Range    GLUCOSE BY METER POCT 130 (H) 70 - 99 mg/dL   Comprehensive metabolic panel    Collection Time: 08/13/22  8:41 AM   Result Value Ref Range    Sodium 144 136 - 145 mmol/L    Potassium 5.2 3.4 - 5.3 mmol/L    Creatinine 1.46 (H) 0.51 - 0.95 mg/dL    Urea Nitrogen 42.4 (H) 8.0 - 23.0 mg/dL    Chloride 100  98 - 107 mmol/L    Carbon Dioxide (CO2) 36 (H) 22 - 29 mmol/L    Anion Gap 8 7 - 15 mmol/L    Glucose 139 (H) 70 - 99 mg/dL    Calcium 9.1 8.8 - 10.2 mg/dL    Protein Total 5.6 (L) 6.4 - 8.3 g/dL    Albumin 2.8 (L) 3.5 - 5.2 g/dL    Bilirubin Total 0.2 <=1.2 mg/dL    Alkaline Phosphatase 345 (H) 35 - 104 U/L    AST 18 10 - 35 U/L     (H) 10 - 35 U/L    GFR Estimate 41 (L) >60 mL/min/1.73m2   Tacrolimus by Tandem Mass Spectrometry    Collection Time: 08/13/22  8:41 AM   Result Value Ref Range    Tacrolimus by Tandem Mass Spectrometry 41.3 (HH) 5.0 - 15.0 ug/L    Tacrolimus Last Dose Date      Tacrolimus Last Dose Time     CBC with platelets and differential    Collection Time: 08/13/22  8:41 AM   Result Value Ref Range    WBC Count 7.9 4.0 - 11.0 10e3/uL    RBC Count 3.05 (L) 3.80 - 5.20 10e6/uL    Hemoglobin 9.2 (L) 11.7 - 15.7 g/dL    Hematocrit 30.4 (L) 35.0 - 47.0 %     78 - 100 fL    MCH 30.2 26.5 - 33.0 pg    MCHC 30.3 (L) 31.5 - 36.5 g/dL    RDW 16.3 (H) 10.0 - 15.0 %    Platelet Count 422 150 - 450 10e3/uL    % Neutrophils 86 %    % Lymphocytes 4 %    % Monocytes 7 %    % Eosinophils 0 %    % Basophils 0 %    % Immature Granulocytes 3 %    NRBCs per 100 WBC 0 <1 /100    Absolute Neutrophils 6.7 1.6 - 8.3 10e3/uL    Absolute Lymphocytes 0.3 (L) 0.8 - 5.3 10e3/uL    Absolute Monocytes 0.6 0.0 - 1.3 10e3/uL    Absolute Eosinophils 0.0 0.0 - 0.7 10e3/uL    Absolute Basophils 0.0 0.0 - 0.2 10e3/uL    Absolute Immature Granulocytes 0.3 <=0.4 10e3/uL    Absolute NRBCs 0.0 10e3/uL   Glucose by meter    Collection Time: 08/13/22 12:38 PM   Result Value Ref Range    GLUCOSE BY METER POCT 178 (H) 70 - 99 mg/dL   Glucose by meter    Collection Time: 08/13/22  5:56 PM   Result Value Ref Range    GLUCOSE BY METER POCT 277 (H) 70 - 99 mg/dL   Glucose by meter    Collection Time: 08/13/22 11:21 PM   Result Value Ref Range    GLUCOSE BY METER POCT 234 (H) 70 - 99 mg/dL   Glucose by meter    Collection Time:  08/14/22  4:51 AM   Result Value Ref Range    GLUCOSE BY METER POCT 162 (H) 70 - 99 mg/dL   Tacrolimus by Tandem Mass Spectrometry    Collection Time: 08/14/22  6:30 AM   Result Value Ref Range    Tacrolimus by Tandem Mass Spectrometry 10.6 5.0 - 15.0 ug/L    Tacrolimus Last Dose Date      Tacrolimus Last Dose Time     Extra Green Top (Lithium Heparin) Tube    Collection Time: 08/14/22  6:53 AM   Result Value Ref Range    Hold Specimen JI    Basic metabolic panel    Collection Time: 08/14/22  6:53 AM   Result Value Ref Range    Creatinine 1.58 (H) 0.51 - 0.95 mg/dL    Sodium 143 136 - 145 mmol/L    Potassium 4.2 3.4 - 5.3 mmol/L    Urea Nitrogen 50.2 (H) 8.0 - 23.0 mg/dL    Chloride 101 98 - 107 mmol/L    Carbon Dioxide (CO2) 35 (H) 22 - 29 mmol/L    Anion Gap 7 7 - 15 mmol/L    Glucose 152 (H) 70 - 99 mg/dL    GFR Estimate 37 (L) >60 mL/min/1.73m2    Calcium 8.8 8.8 - 10.2 mg/dL   Hepatic panel    Collection Time: 08/14/22  6:53 AM   Result Value Ref Range    Protein Total 5.2 (L) 6.4 - 8.3 g/dL    Albumin 2.6 (L) 3.5 - 5.2 g/dL    Bilirubin Total <0.2 <=1.2 mg/dL    Alkaline Phosphatase 278 (H) 35 - 104 U/L    AST 18 10 - 35 U/L    ALT 90 (H) 10 - 35 U/L    Bilirubin Direct <0.20 0.00 - 0.30 mg/dL   Glucose by meter    Collection Time: 08/14/22  9:27 AM   Result Value Ref Range    GLUCOSE BY METER POCT 186 (H) 70 - 99 mg/dL   CBC with platelets    Collection Time: 08/14/22 11:22 AM   Result Value Ref Range    WBC Count 10.4 4.0 - 11.0 10e3/uL    RBC Count 3.12 (L) 3.80 - 5.20 10e6/uL    Hemoglobin 9.4 (L) 11.7 - 15.7 g/dL    Hematocrit 31.8 (L) 35.0 - 47.0 %     (H) 78 - 100 fL    MCH 30.1 26.5 - 33.0 pg    MCHC 29.6 (L) 31.5 - 36.5 g/dL    RDW 16.7 (H) 10.0 - 15.0 %    Platelet Count 407 150 - 450 10e3/uL   Glucose by meter    Collection Time: 08/14/22 11:30 AM   Result Value Ref Range    GLUCOSE BY METER POCT 145 (H) 70 - 99 mg/dL   Glucose by meter    Collection Time: 08/14/22  5:25 PM   Result Value  Ref Range    GLUCOSE BY METER POCT 207 (H) 70 - 99 mg/dL   Glucose by meter    Collection Time: 08/14/22  7:44 PM   Result Value Ref Range    GLUCOSE BY METER POCT 216 (H) 70 - 99 mg/dL   Glucose by meter    Collection Time: 08/15/22 12:01 AM   Result Value Ref Range    GLUCOSE BY METER POCT 189 (H) 70 - 99 mg/dL   Glucose by meter    Collection Time: 08/15/22  4:16 AM   Result Value Ref Range    GLUCOSE BY METER POCT 240 (H) 70 - 99 mg/dL   Tacrolimus by Tandem Mass Spectrometry    Collection Time: 08/15/22  6:28 AM   Result Value Ref Range    Tacrolimus by Tandem Mass Spectrometry 14.2 5.0 - 15.0 ug/L    Tacrolimus Last Dose Date      Tacrolimus Last Dose Time     Extra Green Top (Lithium Heparin) Tube    Collection Time: 08/15/22  6:28 AM   Result Value Ref Range    Hold Specimen JIC    Extra Purple Top Tube    Collection Time: 08/15/22  6:28 AM   Result Value Ref Range    Hold Specimen JIC    CBC with platelets    Collection Time: 08/15/22  6:28 AM   Result Value Ref Range    WBC Count 10.5 4.0 - 11.0 10e3/uL    RBC Count 2.86 (L) 3.80 - 5.20 10e6/uL    Hemoglobin 8.8 (L) 11.7 - 15.7 g/dL    Hematocrit 28.9 (L) 35.0 - 47.0 %     (H) 78 - 100 fL    MCH 30.8 26.5 - 33.0 pg    MCHC 30.4 (L) 31.5 - 36.5 g/dL    RDW 16.5 (H) 10.0 - 15.0 %    Platelet Count 390 150 - 450 10e3/uL   HLA Donor Specific Antibody    Collection Time: 08/15/22  6:28 AM   Result Value Ref Range    Donor Identification 06/28/2022     Organ Lung     DSA Present YES     DQB2 3584     DSA Comments        Flow Single Antigen Beads assays are intended for detection/identification of IgG anti-HLA antibodies. Mfi values may not accurately quantify donor-specific antibody levels in all instances.    DSA Test Method SA FCS    HLA Adryan Class I, Single Antigen    Collection Time: 08/15/22  6:28 AM   Result Value Ref Range    SA 1 TEST METHOD SA FCS     SA 1 CELL Class I     SA1 HI RISK ADRYAN None     SA1 MOD RISK ADRYAN None     SA 1  COMMENTS         Test performed by modified procedure. Serum heat inactivated and tested by a modified (Harrison) protocol including fetal calf serum addition. High-risk, mfi >3,000. Mod-risk, mfi 500-3,000.   HLA Adryan Class II, Single Antigen    Collection Time: 08/15/22  6:28 AM   Result Value Ref Range    SA 2 TEST METHOD SA FCS     SA 2 CELL Class II     SA2 HI RISK ADRYAN DQ:2     SA2 MOD RISK ADRYAN None     SA 2 COMMENTS        Test performed by modified procedure. Serum heat inactivated and tested by a modified (Harrison) protocol including fetal calf serum addition. High-risk, mfi >3,000. Mod-risk, mfi 500-3,000.   HLA Adryan, CPRA    Collection Time: 08/15/22  6:28 AM   Result Value Ref Range    UNOS CPRA 37     UNACCEPTABLE ANTIGENS DQ:2     Glucose by meter    Collection Time: 08/15/22  9:02 AM   Result Value Ref Range    GLUCOSE BY METER POCT 164 (H) 70 - 99 mg/dL   Glucose by meter    Collection Time: 08/15/22 12:07 PM   Result Value Ref Range    GLUCOSE BY METER POCT 175 (H) 70 - 99 mg/dL   Echo Limited    Collection Time: 08/15/22  2:00 PM   Result Value Ref Range    LVEF  60-65%    Glucose by meter    Collection Time: 08/15/22  3:51 PM   Result Value Ref Range    GLUCOSE BY METER POCT 219 (H) 70 - 99 mg/dL   Glucose by meter    Collection Time: 08/15/22  8:55 PM   Result Value Ref Range    GLUCOSE BY METER POCT 196 (H) 70 - 99 mg/dL   Glucose by meter    Collection Time: 08/16/22  4:22 AM   Result Value Ref Range    GLUCOSE BY METER POCT 193 (H) 70 - 99 mg/dL   Glucose by meter    Collection Time: 08/16/22  8:20 AM   Result Value Ref Range    GLUCOSE BY METER POCT 189 (H) 70 - 99 mg/dL   Glucose by meter    Collection Time: 08/16/22 12:08 PM   Result Value Ref Range    GLUCOSE BY METER POCT 135 (H) 70 - 99 mg/dL   Basic metabolic panel    Collection Time: 08/16/22  2:17 PM   Result Value Ref Range    Creatinine 1.56 (H) 0.51 - 0.95 mg/dL    Sodium 139 136 - 145 mmol/L    Potassium 4.8 3.4 - 5.3 mmol/L    Urea Nitrogen 71.1 (H) 8.0  - 23.0 mg/dL    Chloride 95 (L) 98 - 107 mmol/L    Carbon Dioxide (CO2) 37 (H) 22 - 29 mmol/L    Anion Gap 7 7 - 15 mmol/L    Glucose 173 (H) 70 - 99 mg/dL    GFR Estimate 38 (L) >60 mL/min/1.73m2    Calcium 9.3 8.8 - 10.2 mg/dL   CBC with platelets    Collection Time: 08/16/22  2:17 PM   Result Value Ref Range    WBC Count 11.7 (H) 4.0 - 11.0 10e3/uL    RBC Count 2.94 (L) 3.80 - 5.20 10e6/uL    Hemoglobin 9.1 (L) 11.7 - 15.7 g/dL    Hematocrit 29.7 (L) 35.0 - 47.0 %     (H) 78 - 100 fL    MCH 31.0 26.5 - 33.0 pg    MCHC 30.6 (L) 31.5 - 36.5 g/dL    RDW 16.4 (H) 10.0 - 15.0 %    Platelet Count 404 150 - 450 10e3/uL   Glucose by meter    Collection Time: 08/16/22  4:14 PM   Result Value Ref Range    GLUCOSE BY METER POCT 176 (H) 70 - 99 mg/dL   Glucose by meter    Collection Time: 08/16/22  5:03 PM   Result Value Ref Range    GLUCOSE BY METER POCT 171 (H) 70 - 99 mg/dL   Glucose by meter    Collection Time: 08/16/22  6:34 PM   Result Value Ref Range    GLUCOSE BY METER POCT 200 (H) 70 - 99 mg/dL   Glucose by meter    Collection Time: 08/16/22  7:55 PM   Result Value Ref Range    GLUCOSE BY METER POCT 138 (H) 70 - 99 mg/dL   Glucose by meter    Collection Time: 08/16/22  8:59 PM   Result Value Ref Range    GLUCOSE BY METER POCT 120 (H) 70 - 99 mg/dL   Glucose by meter    Collection Time: 08/17/22 12:28 AM   Result Value Ref Range    GLUCOSE BY METER POCT 165 (H) 70 - 99 mg/dL   Glucose by meter    Collection Time: 08/17/22  4:52 AM   Result Value Ref Range    GLUCOSE BY METER POCT 167 (H) 70 - 99 mg/dL   Basic metabolic panel    Collection Time: 08/17/22  7:06 AM   Result Value Ref Range    Creatinine 1.48 (H) 0.51 - 0.95 mg/dL    Sodium 140 136 - 145 mmol/L    Potassium 5.0 3.4 - 5.3 mmol/L    Urea Nitrogen 68.1 (H) 8.0 - 23.0 mg/dL    Chloride 95 (L) 98 - 107 mmol/L    Carbon Dioxide (CO2) 39 (H) 22 - 29 mmol/L    Anion Gap 6 (L) 7 - 15 mmol/L    Glucose 176 (H) 70 - 99 mg/dL    GFR Estimate 40 (L) >60  mL/min/1.73m2    Calcium 9.0 8.8 - 10.2 mg/dL   Magnesium    Collection Time: 08/17/22  7:06 AM   Result Value Ref Range    Magnesium 2.2 1.7 - 2.3 mg/dL   Extra Purple Top Tube    Collection Time: 08/17/22  7:30 AM   Result Value Ref Range    Hold Specimen JIC    Glucose by meter    Collection Time: 08/17/22  7:51 AM   Result Value Ref Range    GLUCOSE BY METER POCT 162 (H) 70 - 99 mg/dL   Glucose by meter    Collection Time: 08/17/22  9:43 AM   Result Value Ref Range    GLUCOSE BY METER POCT 145 (H) 70 - 99 mg/dL   Magnesium    Collection Time: 08/18/22 10:33 AM   Result Value Ref Range    Magnesium 2.6 (H) 1.6 - 2.3 mg/dL   CBC with platelets    Collection Time: 08/18/22 10:33 AM   Result Value Ref Range    WBC Count 8.7 4.0 - 11.0 10e3/uL    RBC Count 2.79 (L) 3.80 - 5.20 10e6/uL    Hemoglobin 8.6 (L) 11.7 - 15.7 g/dL    Hematocrit 28.3 (L) 35.0 - 47.0 %     (H) 78 - 100 fL    MCH 30.8 26.5 - 33.0 pg    MCHC 30.4 (L) 31.5 - 36.5 g/dL    RDW 16.7 (H) 10.0 - 15.0 %    Platelet Count 387 150 - 450 10e3/uL   Comprehensive metabolic panel    Collection Time: 08/18/22 10:33 AM   Result Value Ref Range    Sodium 143 133 - 144 mmol/L    Potassium 4.7 3.4 - 5.3 mmol/L    Chloride 97 94 - 109 mmol/L    Carbon Dioxide (CO2) 44 (H) 20 - 32 mmol/L    Anion Gap 2 (L) 3 - 14 mmol/L    Urea Nitrogen 74 (H) 7 - 30 mg/dL    Creatinine 1.56 (H) 0.52 - 1.04 mg/dL    Calcium 9.0 8.5 - 10.1 mg/dL    Glucose 111 (H) 70 - 99 mg/dL    Alkaline Phosphatase 193 (H) 40 - 150 U/L    AST 16 0 - 45 U/L    ALT 41 0 - 50 U/L    Protein Total 5.9 (L) 6.8 - 8.8 g/dL    Albumin 2.3 (L) 3.4 - 5.0 g/dL    Bilirubin Total 0.3 0.2 - 1.3 mg/dL    GFR Estimate 38 (L) >60 mL/min/1.73m2   General PFT Lab (Please always keep checked)    Collection Time: 08/18/22 10:41 AM   Result Value Ref Range    FVC-Pred 3.06 L    FVC-Pre 1.33 L    FVC-%Pred-Pre 43 %    FEV1-Pre 1.22 L    FEV1-%Pred-Pre 50 %    FEV1FVC-Pred 79 %    FEV1FVC-Pre 92 %    FEFMax-Pred  6.14 L/sec    FEFMax-Pre 3.79 L/sec    FEFMax-%Pred-Pre 61 %    FEF2575-Pred 2.22 L/sec    FEF2575-Pre 2.04 L/sec    MYN6020-%Pred-Pre 91 %    ExpTime-Pre 4.94 sec    FIFMax-Pre 2.55 L/sec    FEV1FEV6-Pred 81 %    FEV1FEV6-Pre 95 %                 Results as noted above.    PFT Interpretation:  Severe restrictive ventilatory defect.  Increased from previous.  First FEV1 since lung transplantation  Valid Maneuver      CXR (8/18/2022), personally reviewed by me: The Lung fields are clear. Left effusion is stable. Cardiac silhouette is wnl.     Assessment and plan: Sofie Rodriguez is a 62 year old female with a PMH significant for end-stage COPD, HTN, HFpEF, Mycobacterium peregrinum colonization, h/o hepatitis C, HECTOR s/p LEEP procedure, and former methamphetamine use.  S/p BSLT on 6/28/22 (lungs slightly undersized), but with persistent hypercapnia. Post-operative course complicated by bilateral pleural effusions, left radial artery thrombus (presumed secondary to arterial line) s/p thrombectomy 7/2, BACILIO, C diff, gastroparesis s/p GJ tube placement in IR 7/27, s/p EGD 8/3 with pyloric ulcer noted, melena with hgb drop (8/8), elevated LFTs (8/8) with extrahepatic mass on US and MRCP with increase in biliary dilation.  S/p ERCP 8/11 with findings concerning for hemobilia, but no bleeding from ulcer in pyloric channel.    #. 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    - Interestingly has persistent hypercapnia without dyspnea, appears to be a respiratory drive problem.  Sniff (7/14) notable for right hemidiaphragm palsy. NIPPV initially overnight for persistent hypercapnia, stopped 8/3 given lack of improvement with therapy,  compensated hypercapnia persists.  - Bronch (8/2) with normal inspection of anastomoses.    -   CTA abdomen (8/11) noted similar appearance of bilateral loculated moderate pleural effusions with adjacent compressive atelectasis and LL predominant interlobular septal thickening.    - S/p right diagnostic thoracentesis (8/12) with 100 ml removed.  CXR (8/13) demonstrates small right effusion, improved loculated left effusion.  Continues to use 1L NC for comfort without evidence of hypoxia.      Immunosuppression:  Induction therapy with basiliximab (and high dose IV steroid).  - Tacrolimus 4 mg BID (via J tube).  Goal level 8-12.  -  mg BID (increased 8/7, prior decrease for GI symptoms/leukopenia)   - Prednisone next taper due 8/18 (not yet ordered)  Date AM dose (mg) PM dose (mg)   8/18/22 10 10   9/15/22 10 7.5   10/13/22 7.5 7.5   11/10/22 7.5 5   12/8/22 5 5   1/5/23 5 2.5      8/18/22: Pulm sx are stable. Still using Oxygen 1 - 2lpm NC at all times. Once home PT/OT is initiated we will get a good assessment of O2 needs and then consider checking overnight oximetry (if there is no overnight exertional hypoxia).   - CXR 8/18 with stable left pleural effusions (personally reviewed)  - Aerobika and incentive spirometry hourly while awake  - FEV1 is lower than expected most likely due to Rt. diaphragm weakness and left effusion. We will proceed with diagnostic/therapeutic tap of left effusion to see if this helps. The Effusion has been stable over the last one to two weeks. it is likely loculated.  - Surveillance bronch one month from now, hopefully she will be in a better position physically to tolerate it.    #. Positive DSA: Newly positive DSA on 8/10, DQB2 with mfi 2155.   - Repeat DSA pending (8/15) was 3582. Will repeat in 10days.    #. ID:  Prophylaxis:   - Dapsone qMWF for PJP ppx  - VGCV for CMV ppx, CMV monthly (negative 8/8)  - Nystatin for oral candidiasis ppx, 6 month course  - See below for  serologies and viral ppx:    Donor Recipient Intervention   CMV status Positive Positive Valganciclovir POD #8-90   EBV status Positive Positive EBV monitoring as below   HSV status N/A Positive Not indicated        Donor bronch cultures (OSH) with Strep beta hemolytic (not group A).  - Pleural fluid cultures (8/12) NGTD     H/o M. peregrinum colonization: NGTD post-transplant.  - AFB to be sent on all future bronchs     Streptococcus pneumoniae:  Stenotrophomonas maltophilia: Noted in recipient cultures at time of transplant.  S/p ceftazidime 6/28-7/10, vancomycin 7/7-7/8 and 6/28-6/30, and levofloxacin 7/10-7/12 for total 2 week ABX course.    H/o hepatitis C:  Diagnosed in 1980s s/p 2m treatment, quant negative since 10/2017, last positive 2/20/17 ([phone]).  Ab positive on 6/2021 with negative HCV PCR.  H/o remote EtOH abuse.  MR elastography (4/27/21) with hepatology review and consult without any concerns post transplant.  Hepatitis C RNA negative and hepatitis C antibody positive (old) on 6/28.  Repeat hepatitis C RNA negative 8/8 in setting of elevated LFTs.     EBV viremia: EBV level 11k, log 4.1 on 7/28.  Not likely to be clinically significant.  Repeat EBV (in the setting of elevated LFTs) decreased to 1,501, log 3.2 (8/8).  - EBV monthly monitoring due 9/8    C diff infection: Abdominal pain with diarrhea noted 7/8, C diff positive 7/11. S/p PO vancomycin (7/11-7/28).  Repeat C diff negative 8/6.  Low threshold to resume vancomycin in the future with ABX course.  Loose stools (on tube feeds) stable.     #. Hypogammaglobulinemia: IgG adequate at time of transplant, repeat level low (336) on 7/28.  S/p IVIG dosing with premedication 7/30, tolerated well.  IgG on 8/10 low but stable at 590, replacement not indicated.  - Repeat IgG due 8/30    #. Cardiology:  A flutter with RVR/SVT: SVT first noted on 7/14, prior to HD line placement, continues intermittently.  Aflutter with RVR to 200 7/17 triggered by  activity.  EP consulted 7/17 given persistent tachycardia/dysrhythmia, midodrine discontinued.  AC deferred per EP given bleeding risk and despite CHADSVASc of 2.  .  8/18/2022: Ziopatch ordered and follow up with EP in 1 - 2 months. The Ziopatch fell off, will replace it.  - On metoprolol    H/o HTN:  H/o HFpEF:  Had vasoplegia post operatively. Last echo 7/7/22 normal EF with good LV function. S/p hydrocortisone 7/6-7/9 and fludrocortisone 7/6-7/11 without significant improvement.  - off midodrine 7/19    #. Left hand ischemia: Radial artery thrombosis identified on duplex doppler s/p thrombectomy on 7/2.  - Repeat LUE arterial US 8/15/22 with dopplerable flow   - Peripheral US (BUE and BLE) to screen for thrombus prior to discharge (8/15/22) - negative    #. BACILIO:   Hyperkalemia: Likely multifactorial including medications (Bactrim, tacrolimus) and hypotension.  iHD 7/14-7/16.  Creatinine increased since 7/29 with Diamox and elevated tacrolimus level.  Potassium had been stable, elevated intermittently since 8/8.  Transitioned to low potassium TF formula 8/8 although intermittently held d/t GI symptoms  - Tacrolimus monitoring as above  - Florinef (started 8/9, increased 8/12)    Hypomagnesemia: Suppressed absorption d/t CNI.  Continue daily magnesium with replacement protocol prn.    #. Elevated LFTs:   Extrahepatic and intrahepatic biliary dilation: Acute rise in LFTs (alk phos 861, , AST 1,143 and bilirubin 0.9) on 8/8 (prior normal) associated with increased and different abdominal pain as below.  Amylase low, lipase normal.  Abdominal US (8/8) with extrahepatic mass at level of common bile duct with associated intrahepatic and common bile duct dilation, patent hepatic vasculature, and layering gallbladder sludge.  MRCP (8/9) with slight increase in moderate biliary dilation and gall bladder with moderate protein/hemorrhagic material. S/p ERCP (8/11) with findings concerning for hemobilia, stent placed  across duodenum and in CBD.  CTA abdomen (8/11) without evidence of acute GI bleed.  GI team concerned bleeding originating from gallbladder. Hepatic panel stable/improving.    8/18/2022: LFT's improving slowly.  - Repeat abdominal CT in 6 months for pancreatic findings (~2/2023)  - Management per primary team with consult by Panc/Bili, General Surgery, and Surg Onc  - Repeat ERCP with Dr. Arroyo in 2 months, ~ 10/16/22.    #. Nutrition: On Novasource renal via Jtube along with all meds.  Current regimen: Novasource Renal @ 35 ml/hr (840 ml) + 1 pkt Prosource. She can try taking Yogurt, jello or icecream as tolerated. She notices pain if taken fast.    #. IPMN: MRCP (8/9/22) showed cystic foci in the pancreatic head (most consistent with IPMN). The Cystic foci in the pancreatic head measuring 4 x 3 mm superiorly and 4 x 3 mm inferiorly.   Per [facility] guidelines on IPMN:  - Follow up imaging in 6 months to 1 year, then lengthen interval to 2-3 years if no change    #. Severe gastroparesis:   Pyloric ulcer: Cramping abdominal pain 7/15. CT abdomen (7/15) with moderate to large gastric distention, otherwise without obstruction.  NG placed for LIS with moderate to large output for several days, did not tolerate clamping.  Gastric emptying study (7/20) with severe gastric emptying delay (95% retention at 4 hours), s/p GJ tube placement in IR 7/27.   - S/p EGD 8/3 for coffee ground G tube output, noted to have pyloric ulcer and excessive gastric fluid and residual food.  Increased abdominal pain/cramping with trial of cycled TF 8/7 to 8/8. Hemoglobin drop with dark tarry stools 8/8.  S/p repeat EGD (8/11) with mild erythema 2/2 GJ tube trauma seen near insertion site but no active bleeding.  8/18/2022:   - PPI BID  - Simethicone prn  - TF and ALL medications via J tube  - G tube to gravity drainage; full liquid diet for comfort only (recommend minimal intake d/t need for increased pain meds with increased PO)  - Repeat EGD  in 8 weeks (~10/6) to check healing of ulcer    #. Post op pain management: On Oxycodone 5 - 10mg Q4hr prn. Will add tylenol 650mg prn.    #. Anemia: Will monitor. Last PRBC on 8/9/22. Most likely due to chronic illness and BM suppression from meds. MCV elevated due to MMF.    #. Stress-induced/steroid induced hyperglycemia with intermittent hypoglycemia:  8/18/2022:   - Controlled on 7 units of glargine BID previously  - sliding scale insulin, adjust as needed  - testing 4 times a day is needed due to insulin dependence due to hyperglycemia caused by steroids    #. HCM:   - uptodate on C19 vaccination x 4   - Due for Evusheld at next visit.    Harshad Gong MD.  Pulmonary and Critical Care.  08/18/2022  1:06 PM              Transplant Coordinator Note    Reason for visit: Post lung transplant follow up visit   Coordinator: Present   Caregiver:  SonNain    Health concerns addressed today:  1. discharge yesterday from hospital.   2. GI: continues to feel bloated. Has G tube drainage bag connected. Yesterday - had ice cream and jello.   3.  Respiratory - continues to use 1L  4. Nasal drainage      Activity/rehab: home OT/PT until ready for pulmonology   Oxygen needs: 1L O2  Pain management/RX:   Diabetic management: on insulin/lantus  Next Bronch due: POD 60  Risk Criteria Labs: negative 7/28/22  CMV status: D+/R+  Valcyte stopped: POD 8-90  EBV status: D+/R+  AC/asa:  On ASA 81mg  PJP prophylactic:     COVID:  1. COVID-19 infection (yes/no, date of most recent positive test):  no  2. Status/instructions given about COVID-19 vaccine:     Pt Education: medications (use/dose/side effects), how/when to call coordinator, frequency of labs, s/s of infection/rejection, call prior to starting any new medications, lab/vital sign book    Health Maintenance:     Last colonoscopy:     Next colonoscopy due:     Dermatology:    Vaccinations this visit:     Labs, CXR, PFTs reviewed with patient  Medication record reviewed and  reconciled  Questions and concerns addressed    Patient Instructions  1. Continue to hydrate with 60-70 oz fluids daily.  2. Continue to exercise daily or most days of the week.  3. It doesn't seem like the COVID vaccine is working well in lung transplant patients. A number of lung transplant patients have gotten sick with COVID even after receiving the vaccines.  Based on our recent experience, it can be life-threatening to get COVID  even after being vaccinated. Please continue to act like you did not get the COVID vaccine - social distancing, wearing a mask, good hand hygiene, etc. If the people around you are vaccinated, it will help reduce the risk of you getting COVID. All members of your household should be vaccinated.  4. It's okay to try plain yogurt. Nadya will have the dietitian, Kera, call you and follow up.   5. You can take Tylenol 650mg every 6 hours to stay on top of your pain.   6. Home OT/PT should be calling you.   7. Nadya will call you to get more gastric bags.   8. We will have the heart people call you about the Zio patch.     Next transplant clinic appointment: next Wednesday, 8/24 with CXR, labs and PFTs before appointment with Kaylin Triana  Next lab draw: pending tacrolimus level, otherwise in 1 week      AVS printed at time of check out        Again, thank you for allowing me to participate in the care of your patient.        Sincerely,        Harshad Gong MD

## 2022-08-18 NOTE — NURSING NOTE
Chief Complaint   Patient presents with     RECHECK     S/p lung tx     Blood pressure 119/68, pulse 101, weight 69.9 kg (154 lb), SpO2 99 %, not currently breastfeeding.    Magdy Hatfield on 8/18/2022 at 11:56 AM

## 2022-08-18 NOTE — TELEPHONE ENCOUNTER
"Order placed for IR referral. Pt needs to be scheduled for L thoracentesis.     VM also left with patient- would she like a new Zio monitor mailed to her to put on herself (instruction included) or she can come into clinic to have new monitor placed.      Addendum:   Tacrolimus level 7.6 at 14.5 hours, on 8/18/22  Goal 8-12   Current dose 3.5 mg in AM, 3.5 mg in PM     No change in dose. Pt likely at goal at 12 hour remington. Will recheck a level  next week at clinic appointment     Discussed with patient and Nain Thompson message sent     Patients g-tube clogged this morning after taking liquid medications (got her IS meds). Was unable to flush either port for meds/feeding. Instructed patient to instill warm water and try \"push/pull\" method, try changing positions/coughing. No luck flushing tube. High priority message sent to [facility] to see if they can get patient clog zapper/help troubleshoot tube. [facility] unable to provide clog zapper/troubleshoot. VM left with both IR staff and IR scheduling. Pt took her morning lantus, ate an applesauce and was going to check BG at 1100.    IR called back, unable to provide assistance today outpatient. Writer called patient to see if they had any luck unclogging tube. Tube still clogged. Ask patient what her blood sugar was at 1100, reports it was 68. Asked patient to check blood sugar again with me on the phone, BG 62. Pt did not have any emergency medication for hypoglycemia, no juice or pop in the house. Ask Son to mix up some jello, has two cups. Also had pt eat Spree candy. Texted BG ~ 10 minutes after first cup of jell-om, BG 60. Drank second cup and tested ~15 minutes later, BG 63. Pt has no more jello in house. Instructed patient to grab Spree candy and ate on way to ER.     Report called to charge nurse, Mic, in ED. Pt to arrive shortly.                   "

## 2022-08-18 NOTE — PATIENT INSTRUCTIONS
Patient Instructions  1. Continue to hydrate with 60-70 oz fluids daily.  2. Continue to exercise daily or most days of the week.  3. It doesn't seem like the COVID vaccine is working well in lung transplant patients. A number of lung transplant patients have gotten sick with COVID even after receiving the vaccines.  Based on our recent experience, it can be life-threatening to get COVID  even after being vaccinated. Please continue to act like you did not get the COVID vaccine - social distancing, wearing a mask, good hand hygiene, etc. If the people around you are vaccinated, it will help reduce the risk of you getting COVID. All members of your household should be vaccinated.  4. It's okay to try plain yogurt. Nadya will have the dietitian, Kera, call you and follow up.   5. You can take Tylenol 650mg every 6 hours to stay on top of your pain.   6. Home OT/PT should be calling you.   7. Nadya will call you to get more gastric bags.   8. We will have the heart people call you about the Zio patch.     Next transplant clinic appointment: next Wednesday, 8/24 with CXR, labs and PFTs before appointment with Kaylin Triana  Next lab draw: pending tacrolimus level, otherwise in 1 week      ~~~~~~~~~~~~~~~~~~~~~~~~~    Thoracic Transplant Office phone [phone], fax [phone]    Office Hours 8:30 - 5:00     For after-hours urgent issues, please dial [phone], and ask to speak with the Thoracic Transplant Coordinator On-Call.  --------------------  To expedite your medication refill(s), please contact your pharmacy and have them fax a refill request to: [phone]  .   *Please allow 3 business days for routine medication refills.  *Please allow 5 business days for controlled substance medication refills.    **For Diabetic medications and supplies refill(s), please contact your pharmacy and have them contact your Endocrine team.  --------------------  For scheduling appointments call  [phone].  --------------------  Please Note: If you are active on [facility], all future test results will be sent by [facility] message only, and will no longer be called to patient. You may also receive communication directly from your physician.

## 2022-08-18 NOTE — PROGRESS NOTES
This is a recent snapshot of the patient's [facility] medical record.  For current drug dose and complete information and questions, call [phone] or In [facility] pool, [facility] home infusion ([id number])  CSN Number:  [id number]

## 2022-08-18 NOTE — TELEPHONE ENCOUNTER
Sofie is a post-lung transplant patient who discharged 8/17/22.    Patient chooses to receive medications from  specialty pharmacy.     Sofie's daughters, Julia and Charity, will be responsible for managing medications. They plan to rotate primary caregiver responsibilities. Their phones are- Julia: [phone], Charity: [phone].     Patient will be staying locally at [facility] until medically cleared to return to [facility]. Walter discussed with Julia, since some of the liquid medications will have to come from  Specialty, they thought it would be good to have  Specialty fill all Sofie's monthly meds, while local (vs.[facility]), so they have practice with this process. That way, when they return to [facility], they are familiar with the mail order process.    Sophia Marcelo [facility]  [phone]

## 2022-08-18 NOTE — PLAN OF CARE
Pt discharge at 1700. Discharge planning and transplant packet gone over and all questions answered. Discharged with PEG tube - teaching / education provided. Discharged to ARU with Son. VSS time of transfer. Removed Midline.

## 2022-08-18 NOTE — PROGRESS NOTES
Physical Therapy Discharge Summary    Reason for therapy discharge:    Discharged to home with outpatient therapy.    Progress towards therapy goal(s). See goals on Care Plan in [facility] electronic health record for goal details.  Goals partially met.  Barriers to achieving goals:   limited tolerance for therapy.    Therapy recommendation(s):    Continued therapy is recommended.  Rationale/Recommendations:  Progress to prior level of IND..

## 2022-08-19 ENCOUNTER — HOSPITAL ENCOUNTER (EMERGENCY)
Facility: CLINIC | Age: 62
Discharge: HOME | End: 2022-08-19
Attending: EMERGENCY MEDICINE | Admitting: EMERGENCY MEDICINE
Payer: MEDICARE

## 2022-08-19 ENCOUNTER — PREP FOR PROCEDURE (OUTPATIENT)
Dept: GASTROENTEROLOGY | Facility: CLINIC | Age: 62
End: 2022-08-19

## 2022-08-19 ENCOUNTER — APPOINTMENT (OUTPATIENT)
Dept: GENERAL RADIOLOGY | Facility: CLINIC | Age: 62
End: 2022-08-19
Attending: EMERGENCY MEDICINE
Payer: MEDICARE

## 2022-08-19 VITALS
TEMPERATURE: 97.7 F | SYSTOLIC BLOOD PRESSURE: 146 MMHG | OXYGEN SATURATION: 99 % | HEART RATE: 104 BPM | DIASTOLIC BLOOD PRESSURE: 69 MMHG | RESPIRATION RATE: 16 BRPM

## 2022-08-19 DIAGNOSIS — Z94.2 LUNG REPLACED BY TRANSPLANT (H): Primary | ICD-10-CM

## 2022-08-19 DIAGNOSIS — M62.81 GENERALIZED MUSCLE WEAKNESS: ICD-10-CM

## 2022-08-19 DIAGNOSIS — K83.8 DILATED BILE DUCT: Primary | ICD-10-CM

## 2022-08-19 DIAGNOSIS — T85.598A OBSTRUCTION OF FEEDING TUBE, INITIAL ENCOUNTER: ICD-10-CM

## 2022-08-19 DIAGNOSIS — J90 PLEURAL EFFUSION: ICD-10-CM

## 2022-08-19 DIAGNOSIS — Z94.2 S/P LUNG TRANSPLANT (H): ICD-10-CM

## 2022-08-19 DIAGNOSIS — K94.23 MECHANICAL COMPLICATION OF GASTROSTOMY (H): ICD-10-CM

## 2022-08-19 DIAGNOSIS — Z48.23 ENCOUNTER FOR AFTERCARE FOLLOWING LIVER TRANSPLANT (H): ICD-10-CM

## 2022-08-19 LAB
ALBUMIN SERPL BCG-MCNC: 3.5 G/DL (ref 3.5–5.2)
ALP SERPL-CCNC: 175 U/L (ref 35–104)
ALT SERPL W P-5'-P-CCNC: 33 U/L (ref 10–35)
ANION GAP SERPL CALCULATED.3IONS-SCNC: 7 MMOL/L (ref 7–15)
AST SERPL W P-5'-P-CCNC: 24 U/L (ref 10–35)
BACTERIA PLR CULT: NORMAL
BASOPHILS # BLD AUTO: 0 10E3/UL (ref 0–0.2)
BASOPHILS NFR BLD AUTO: 1 %
BILIRUB SERPL-MCNC: 0.3 MG/DL
BUN SERPL-MCNC: 61.7 MG/DL (ref 8–23)
CALCIUM SERPL-MCNC: 9.5 MG/DL (ref 8.8–10.2)
CHLORIDE SERPL-SCNC: 96 MMOL/L (ref 98–107)
CREAT SERPL-MCNC: 1.45 MG/DL (ref 0.51–0.95)
DEPRECATED HCO3 PLAS-SCNC: 43 MMOL/L (ref 22–29)
EOSINOPHIL # BLD AUTO: 0.2 10E3/UL (ref 0–0.7)
EOSINOPHIL NFR BLD AUTO: 2 %
ERYTHROCYTE [DISTWIDTH] IN BLOOD BY AUTOMATED COUNT: 16.9 % (ref 10–15)
GFR SERPL CREATININE-BSD FRML MDRD: 41 ML/MIN/1.73M2
GLUCOSE BLDC GLUCOMTR-MCNC: 170 MG/DL (ref 70–99)
GLUCOSE BLDC GLUCOMTR-MCNC: 87 MG/DL (ref 70–99)
GLUCOSE SERPL-MCNC: 84 MG/DL (ref 70–99)
HCT VFR BLD AUTO: 29.9 % (ref 35–47)
HGB BLD-MCNC: 8.9 G/DL (ref 11.7–15.7)
HOLD SPECIMEN: NORMAL
HOLD SPECIMEN: NORMAL
IMM GRANULOCYTES # BLD: 0.3 10E3/UL
IMM GRANULOCYTES NFR BLD: 4 %
LYMPHOCYTES # BLD AUTO: 0.3 10E3/UL (ref 0.8–5.3)
LYMPHOCYTES NFR BLD AUTO: 4 %
MCH RBC QN AUTO: 30 PG (ref 26.5–33)
MCHC RBC AUTO-ENTMCNC: 29.8 G/DL (ref 31.5–36.5)
MCV RBC AUTO: 101 FL (ref 78–100)
MONOCYTES # BLD AUTO: 0.8 10E3/UL (ref 0–1.3)
MONOCYTES NFR BLD AUTO: 10 %
NEUTROPHILS # BLD AUTO: 6.3 10E3/UL (ref 1.6–8.3)
NEUTROPHILS NFR BLD AUTO: 79 %
NRBC # BLD AUTO: 0 10E3/UL
NRBC BLD AUTO-RTO: 0 /100
PLATELET # BLD AUTO: 413 10E3/UL (ref 150–450)
POTASSIUM SERPL-SCNC: 3.7 MMOL/L (ref 3.4–5.3)
PROT SERPL-MCNC: 6.3 G/DL (ref 6.4–8.3)
RBC # BLD AUTO: 2.97 10E6/UL (ref 3.8–5.2)
SODIUM SERPL-SCNC: 146 MMOL/L (ref 136–145)
WBC # BLD AUTO: 7.8 10E3/UL (ref 4–11)

## 2022-08-19 PROCEDURE — 74018 RADEX ABDOMEN 1 VIEW: CPT | Mod: 26 | Performed by: RADIOLOGY

## 2022-08-19 PROCEDURE — 85004 AUTOMATED DIFF WBC COUNT: CPT | Performed by: EMERGENCY MEDICINE

## 2022-08-19 PROCEDURE — 999N000065 XR ABDOMEN 1 VIEW

## 2022-08-19 PROCEDURE — 258N000001 HC RX 258: Performed by: EMERGENCY MEDICINE

## 2022-08-19 PROCEDURE — 99284 EMERGENCY DEPT VISIT MOD MDM: CPT

## 2022-08-19 PROCEDURE — 80053 COMPREHEN METABOLIC PANEL: CPT | Performed by: EMERGENCY MEDICINE

## 2022-08-19 PROCEDURE — 99282 EMERGENCY DEPT VISIT SF MDM: CPT | Performed by: EMERGENCY MEDICINE

## 2022-08-19 PROCEDURE — 74018 RADEX ABDOMEN 1 VIEW: CPT

## 2022-08-19 RX ORDER — DEXTROSE MONOHYDRATE 25 G/50ML
50 INJECTION, SOLUTION INTRAVENOUS
Status: COMPLETED | OUTPATIENT
Start: 2022-08-19 | End: 2022-08-19

## 2022-08-19 RX ADMIN — DEXTROSE MONOHYDRATE 50 ML: 25 INJECTION, SOLUTION INTRAVENOUS at 16:10

## 2022-08-19 RX ADMIN — DIATRIZOATE MEGLUMINE AND DIATRIZOATE SODIUM 30 ML: 660; 100 SOLUTION ORAL; RECTAL at 17:33

## 2022-08-19 ASSESSMENT — ACTIVITIES OF DAILY LIVING (ADL): ADLS_ACUITY_SCORE: 35

## 2022-08-19 NOTE — PROGRESS NOTES
Smartset order placed for diagnostic testing of pleural fluid after L sided thoracentesis performed.    Birth Control Pills Pregnancy And Lactation Text: This medication should be avoided if pregnant and for the first 30 days post-partum. Tazorac Counseling:  Patient advised that medication is irritating and drying.  Patient may need to apply sparingly and wash off after an hour before eventually leaving it on overnight.  The patient verbalized understanding of the proper use and possible adverse effects of tazorac.  All of the patient's questions and concerns were addressed. Sarecycline Pregnancy And Lactation Text: This medication is Pregnancy Category D and not consider safe during pregnancy. It is also excreted in breast milk. Topical Sulfur Applications Pregnancy And Lactation Text: This medication is Pregnancy Category C and has an unknown safety profile during pregnancy. It is unknown if this topical medication is excreted in breast milk. Benzoyl Peroxide Counseling: Patient counseled that medicine may cause skin irritation and bleach clothing.  In the event of skin irritation, the patient was advised to reduce the amount of the drug applied or use it less frequently.   The patient verbalized understanding of the proper use and possible adverse effects of benzoyl peroxide.  All of the patient's questions and concerns were addressed. Erythromycin Counseling:  I discussed with the patient the risks of erythromycin including but not limited to GI upset, allergic reaction, drug rash, diarrhea, increase in liver enzymes, and yeast infections. Azithromycin Pregnancy And Lactation Text: This medication is considered safe during pregnancy and is also secreted in breast milk. High Dose Vitamin A Pregnancy And Lactation Text: High dose vitamin A therapy is contraindicated during pregnancy and breast feeding. Dapsone Counseling: I discussed with the patient the risks of dapsone including but not limited to hemolytic anemia, agranulocytosis, rashes, methemoglobinemia, kidney failure, peripheral neuropathy, headaches, GI upset, and liver toxicity.  Patients who start dapsone require monitoring including baseline LFTs and weekly CBCs for the first month, then every month thereafter.  The patient verbalized understanding of the proper use and possible adverse effects of dapsone.  All of the patient's questions and concerns were addressed. Tazorac Pregnancy And Lactation Text: This medication is not safe during pregnancy. It is unknown if this medication is excreted in breast milk. Detail Level: Zone Spironolactone Counseling: Patient advised regarding risks of diarrhea, abdominal pain, hyperkalemia, birth defects (for female patients), liver toxicity and renal toxicity. The patient may need blood work to monitor liver and kidney function and potassium levels while on therapy. The patient verbalized understanding of the proper use and possible adverse effects of spironolactone.  All of the patient's questions and concerns were addressed. Bactrim Counseling:  I discussed with the patient the risks of sulfa antibiotics including but not limited to GI upset, allergic reaction, drug rash, diarrhea, dizziness, photosensitivity, and yeast infections.  Rarely, more serious reactions can occur including but not limited to aplastic anemia, agranulocytosis, methemoglobinemia, blood dyscrasias, liver or kidney failure, lung infiltrates or desquamative/blistering drug rashes. Erythromycin Pregnancy And Lactation Text: This medication is Pregnancy Category B and is considered safe during pregnancy. It is also excreted in breast milk. Benzoyl Peroxide Pregnancy And Lactation Text: This medication is Pregnancy Category C. It is unknown if benzoyl peroxide is excreted in breast milk. Minocycline Counseling: Patient advised regarding possible photosensitivity and discoloration of the teeth, skin, lips, tongue and gums.  Patient instructed to avoid sunlight, if possible.  When exposed to sunlight, patients should wear protective clothing, sunglasses, and sunscreen.  The patient was instructed to call the office immediately if the following severe adverse effects occur:  hearing changes, easy bruising/bleeding, severe headache, or vision changes.  The patient verbalized understanding of the proper use and possible adverse effects of minocycline.  All of the patient's questions and concerns were addressed. Dapsone Pregnancy And Lactation Text: This medication is Pregnancy Category C and is not considered safe during pregnancy or breast feeding. Topical Clindamycin Counseling: Patient counseled that this medication may cause skin irritation or allergic reactions.  In the event of skin irritation, the patient was advised to reduce the amount of the drug applied or use it less frequently.   The patient verbalized understanding of the proper use and possible adverse effects of clindamycin.  All of the patient's questions and concerns were addressed. Use Enhanced Medication Counseling?: No Spironolactone Pregnancy And Lactation Text: This medication can cause feminization of the male fetus and should be avoided during pregnancy. The active metabolite is also found in breast milk. Topical Retinoid counseling:  Patient advised to apply a pea-sized amount only at bedtime and wait 30 minutes after washing their face before applying.  If too drying, patient may add a non-comedogenic moisturizer. The patient verbalized understanding of the proper use and possible adverse effects of retinoids.  All of the patient's questions and concerns were addressed. Bactrim Pregnancy And Lactation Text: This medication is Pregnancy Category D and is known to cause fetal risk.  It is also excreted in breast milk. Isotretinoin Counseling: Patient should get monthly blood tests, not donate blood, not drive at night if vision affected, not share medication, and not undergo elective surgery for 6 months after tx completed. Side effects reviewed, pt to contact office should one occur. Doxycycline Counseling:  Patient counseled regarding possible photosensitivity and increased risk for sunburn.  Patient instructed to avoid sunlight, if possible.  When exposed to sunlight, patients should wear protective clothing, sunglasses, and sunscreen.  The patient was instructed to call the office immediately if the following severe adverse effects occur:  hearing changes, easy bruising/bleeding, severe headache, or vision changes.  The patient verbalized understanding of the proper use and possible adverse effects of doxycycline.  All of the patient's questions and concerns were addressed. Topical Clindamycin Pregnancy And Lactation Text: This medication is Pregnancy Category B and is considered safe during pregnancy. It is unknown if it is excreted in breast milk. Tetracycline Counseling: Patient counseled regarding possible photosensitivity and increased risk for sunburn.  Patient instructed to avoid sunlight, if possible.  When exposed to sunlight, patients should wear protective clothing, sunglasses, and sunscreen.  The patient was instructed to call the office immediately if the following severe adverse effects occur:  hearing changes, easy bruising/bleeding, severe headache, or vision changes.  The patient verbalized understanding of the proper use and possible adverse effects of tetracycline.  All of the patient's questions and concerns were addressed. Patient understands to avoid pregnancy while on therapy due to potential birth defects. Isotretinoin Pregnancy And Lactation Text: This medication is Pregnancy Category X and is considered extremely dangerous during pregnancy. It is unknown if it is excreted in breast milk. Topical Retinoid Pregnancy And Lactation Text: This medication is Pregnancy Category C. It is unknown if this medication is excreted in breast milk. Sarecycline Counseling: Patient advised regarding possible photosensitivity and discoloration of the teeth, skin, lips, tongue and gums.  Patient instructed to avoid sunlight, if possible.  When exposed to sunlight, patients should wear protective clothing, sunglasses, and sunscreen.  The patient was instructed to call the office immediately if the following severe adverse effects occur:  hearing changes, easy bruising/bleeding, severe headache, or vision changes.  The patient verbalized understanding of the proper use and possible adverse effects of sarecycline.  All of the patient's questions and concerns were addressed. Birth Control Pills Counseling: Birth Control Pill Counseling: I discussed with the patient the potential side effects of OCPs including but not limited to increased risk of stroke, heart attack, thrombophlebitis, deep venous thrombosis, hepatic adenomas, breast changes, GI upset, headaches, and depression.  The patient verbalized understanding of the proper use and possible adverse effects of OCPs. All of the patient's questions and concerns were addressed. Azithromycin Counseling:  I discussed with the patient the risks of azithromycin including but not limited to GI upset, allergic reaction, drug rash, diarrhea, and yeast infections. Doxycycline Pregnancy And Lactation Text: This medication is Pregnancy Category D and not consider safe during pregnancy. It is also excreted in breast milk but is considered safe for shorter treatment courses. Topical Sulfur Applications Counseling: Topical Sulfur Counseling: Patient counseled that this medication may cause skin irritation or allergic reactions.  In the event of skin irritation, the patient was advised to reduce the amount of the drug applied or use it less frequently.   The patient verbalized understanding of the proper use and possible adverse effects of topical sulfur application.  All of the patient's questions and concerns were addressed. High Dose Vitamin A Counseling: Side effects reviewed, pt to contact office should one occur. Detail Level: Simple

## 2022-08-19 NOTE — ED NOTES
[facility] Son Nain is caregiver. Lung tx 6/28/22, just discharged 2 days ago, feeding tube clogged, BG 62 today. Pt ate jello and is coming to ED. Per care coordinator, pt can have small amounts of thickened food but has feeding tube due to inability to meet caloric needs. Pt is probably asymptomatic, per care coordinator. Took morning lanuts today. Needs diabetic education. Took half of morning meds, but RN wants to make sure she took tacrolimus.

## 2022-08-19 NOTE — ED PROVIDER NOTES
ED Provider Note  [facility]      History     Chief Complaint   Patient presents with     Tube Change (Cpm)     HPI  Sofie Rodriguez is a 62 year old female with a PMH significant for end-stage COPD, HTN, HFpEF, Mycobacterium peregrinum colonization, h/o hepatitis C, HECTOR s/p LEEP procedure, and former methamphetamine use.  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  Post-operative course complicated by bilateral pleural effusions, left radial artery thrombus (presumed secondary to arterial line) s/p thrombectomy 7/2, BACILIO, C diff, gastroparesis s/p GJ tube placement in IR 7/27, coffee ground G tube output s/p EGD 8/3 with pyloric ulcer noted, melena with hgb drop (8/8), elevated LFTs (8/8) with extrahepatic mass on US and MRCP with increase in biliary dilation.  S/p ERCP 8/11 with findings concerning for hemobilia, but no bleeding from ulcer in pyloric channel.  She was discharged on 8/17/22.     Doing fairly well at her recent follow-up yesterday following discharge  Returning today to the emergency department due to clogged feeding tube.  Otherwise has been taking all her medications except missed some doses this morning due to clogged tube.    Feels somewhat weak but unchanged compared to recent visit and recent hospitalization  No fevers or chills no coughing.  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     Hepatitis 2017    Hep C, [facility]     HTN (hypertension)      Osteopenia      Past Surgical History:   Procedure Laterality Date     BRONCHOSCOPY (RIGID OR FLEXIBLE), DIAGNOSTIC N/A 8/2/2022    Procedure: BRONCHOSCOPY, DIAGNOSTIC- inspection Bronch;   Surgeon: Kamala Lovell MD;  Location:  GI     BRONCHOSCOPY FLEXIBLE AND RIGID N/A 07/19/2022    Procedure: BRONCHOSCOPY inspection only;  Surgeon: Bob Liao MD;  Location:  GI     COLONOSCOPY  2015     CV CORONARY ANGIOGRAM N/A 06/30/2021    Procedure: CV CORONARY ANGIOGRAM;  Surgeon: Alexander Cuellar MD;  Location:  HEART CARDIAC CATH LAB     CV RIGHT HEART CATH MEASUREMENTS RECORDED N/A 06/30/2021    Procedure: CV RIGHT HEART CATH;  Surgeon: Alexander Cuellar MD;  Location:  HEART CARDIAC CATH LAB     ENDOSCOPIC RETROGRADE CHOLANGIOPANCREATOGRAM N/A 8/11/2022    Procedure: ENDOSCOPIC RETROGRADE CHOLANGIOPANCREATOGRAPHY WITH PANCREATIC DUCT NEEDLE KNIFE AND STENT PLACEMENT, BILE DUCT SPHINCTEROTOMY, BLOOD/DEBRIS REMOVAL AND STENT PLACEMENT;  Surgeon: Cosmo Arroyo MD;  Location:  OR     ENT SURGERY  1974    tonsillectomy     ENTEROSCOPY SMALL BOWEL N/A 8/11/2022    Procedure: SMALL BOWEL ENTEROSCOPY;  Surgeon: Cosmo Arroyo MD;  Location:  OR     ESOPHAGOGASTRODUODENOSCOPY, WITH NASOGASTRIC TUBE INSERTION N/A 07/01/2022    Procedure: ESOPHAGOGASTRODUODENOSCOPY, WITH NASOJEJUNAL TUBE INSERTION;  Surgeon: Ozzy Nickerson MD;  Location:  GI     ESOPHAGOSCOPY, GASTROSCOPY, DUODENOSCOPY (EGD), COMBINED N/A 8/3/2022    Procedure: ESOPHAGOGASTRODUODENOSCOPY (EGD);  Surgeon: Ira Andres MD;  Location:  GI     HAND SURGERY       LEEP TX, CERVICAL  04/07/2017    HECTOR III     LYMPH NODE BIOPSY Left 2005    Left axilla, benign- [place]     MIDLINE INSERTION - DOUBLE LUMEN Left 07/28/2022    20cm, Basilic vein     THROMBECTOMY UPPER EXTREMITY Left 07/02/2022    Procedure: LEFT RADIAL ARM THROMBECTOMY;  Surgeon: Christie Graham MD;  Location:  OR     TRANSPLANT LUNG RECIPIENT SINGLE X2 Bilateral 06/28/2022    Procedure: Clamshell Incision, Bilateral Sequential Lung Transplant, On Cardiopulmonary Bypass, Flexible Bronchoscopy;  Surgeon: Bita  Sue SYED MD;  Location: [facility] OR     acetaminophen (TYLENOL) 160 MG/5ML liquid  alcohol swab prep pads  aspirin (ASA) 81 MG EC tablet  blood glucose (CONTOUR NEXT TEST) test strip  blood glucose (NO BRAND SPECIFIED) lancets standard  blood glucose monitoring (CONTOUR NEXT MONITOR W/DEVICE KIT) meter device kit  calcium carbonate 600 mg-vitamin D 400 units (CALTRATE) 600-400 MG-UNIT per tablet  CELLCEPT (BRAND) 200 MG/ML suspension  dapsone (ACZONE) 25 MG tablet  fludrocortisone (FLORINEF) 0.1 MG tablet  insulin aspart (NOVOLOG PEN) 100 UNIT/ML pen  insulin glargine (LANTUS PEN) 100 UNIT/ML pen  insulin pen needle (31G X 5 MM) 31G X 5 MM miscellaneous  metoprolol tartrate (LOPRESSOR) 50 MG tablet  Multiple Vitamins-Minerals (MULTIVITAMINS W/MINERALS) liquid  mycophenolate (GENERIC EQUIVALENT) 200 MG/ML suspension  nystatin (MYCOSTATIN) 548275 UNIT/ML suspension  ondansetron (ZOFRAN ODT) 4 MG ODT tab  oxyCODONE (ROXICODONE) 5 MG tablet  pantoprazole (PROTONIX) 2 mg/mL SUSP suspension  predniSONE (DELTASONE) 5 MG tablet  protein modular (PROSOURCE TF) LIQD  [employer] Container MISC  simethicone (MYLICON) 40 MG/0.6ML suspension  tacrolimus (GENERIC EQUIVALENT) 1 mg/mL suspension  valGANciclovir (VALCYTE) 50 MG/ML solution      Allergies   Allergen Reactions     Blood Transfusion Related (Informational Only) Other (See Comments)     Patient has a history of a clinically significant antibody against RBC antigens.  A delay in compatible RBCs may occur.      Family History  No family history on file.  Social History   Social History     Tobacco Use     Smoking status: Former Smoker     Years: 30.00     Types: Cigarettes     Quit date: 2020     Years since quittin.7     Smokeless tobacco: Never Used   Substance Use Topics     Alcohol use: Not Currently     Drug use: Not Currently     Types: Marijuana, Methamphetamines     Comment: hx:marijuana and methamphetamine-quit both unsure ?  2-3 yrs ago      Past medical  history, past surgical history, medications, allergies, family history, and social history were reviewed with the patient. No additional pertinent items.       Review of Systems  A complete review of systems was performed with pertinent positives and negatives noted in the HPI, and all other systems negative.    Physical Exam   BP: (!) 146/69  Pulse: 104  Temp: 97.7  F (36.5  C)  Resp: 16  SpO2: 99 %  Physical Exam  GEN: Somewhat chronically weak appearing, non toxic, cooperative and conversant.   HEENT: The head is normocephalic and atraumatic. Pupils are equal round and reactive to light. Extraocular motions are intact. There is no facial swelling.   CV: Regular rate   PULM: Unlabored breathing    EXT: Full range of motion.  No edema.  NEURO: Cranial nerves II through XII are intact and symmetric. Bilateral upper and lower extremities grossly show full range of motion without any focal deficits.     PSYCH: Calm and cooperative, interactive.     ED Course      Procedures                   Results for orders placed or performed during the hospital encounter of 08/19/22   XR Abdomen 1 View     Status: None    Narrative    Exam: XR ABDOMEN 1 VIEW, 8/19/2022 5:39 PM    Indication: with contrast to eval Jejunal tube is in place and  operational, not ruptured    Comparison: CT abdomen/pelvis 8/11/2022, abdominal x-ray 8/8/2022,  chest x-ray 8/18/2022    Findings:   Portable frontal radiograph of the abdomen. Approximately 30 mL  Gastrografin was injected through the patient's gastrojejunostomy  tube. Contrast is present in the patient's jejunum with appropriate  positioning of the tube. Partially visualized clamshell sternotomy  wires. Biliary stent. The bowel gas pattern is nonobstructive. No  visualized pneumatosis or portal venous gas. Bibasilar opacities are  better evaluated on chest x-ray from 8/18/2022.      Impression    Impression: Appropriate positioning of the gastrojejunostomy tube.  Contrast present in the  jejunum after injection of the tube with  Gastrografin contrast.    I have personally reviewed the examination and initial interpretation  and I agree with the findings.    YANNICK BENAVIDEZ MD         SYSTEM ID:  [id number]   Comprehensive metabolic panel     Status: Abnormal   Result Value Ref Range    Sodium 146 (H) 136 - 145 mmol/L    Potassium 3.7 3.4 - 5.3 mmol/L    Creatinine 1.45 (H) 0.51 - 0.95 mg/dL    Urea Nitrogen 61.7 (H) 8.0 - 23.0 mg/dL    Chloride 96 (L) 98 - 107 mmol/L    Carbon Dioxide (CO2) 43 (H) 22 - 29 mmol/L    Anion Gap 7 7 - 15 mmol/L    Glucose 84 70 - 99 mg/dL    Calcium 9.5 8.8 - 10.2 mg/dL    Protein Total 6.3 (L) 6.4 - 8.3 g/dL    Albumin 3.5 3.5 - 5.2 g/dL    Bilirubin Total 0.3 <=1.2 mg/dL    Alkaline Phosphatase 175 (H) 35 - 104 U/L    AST 24 10 - 35 U/L    ALT 33 10 - 35 U/L    GFR Estimate 41 (L) >60 mL/min/1.73m2   CBC with platelets and differential     Status: Abnormal   Result Value Ref Range    WBC Count 7.8 4.0 - 11.0 10e3/uL    RBC Count 2.97 (L) 3.80 - 5.20 10e6/uL    Hemoglobin 8.9 (L) 11.7 - 15.7 g/dL    Hematocrit 29.9 (L) 35.0 - 47.0 %     (H) 78 - 100 fL    MCH 30.0 26.5 - 33.0 pg    MCHC 29.8 (L) 31.5 - 36.5 g/dL    RDW 16.9 (H) 10.0 - 15.0 %    Platelet Count 413 150 - 450 10e3/uL    % Neutrophils 79 %    % Lymphocytes 4 %    % Monocytes 10 %    % Eosinophils 2 %    % Basophils 1 %    % Immature Granulocytes 4 %    NRBCs per 100 WBC 0 <1 /100    Absolute Neutrophils 6.3 1.6 - 8.3 10e3/uL    Absolute Lymphocytes 0.3 (L) 0.8 - 5.3 10e3/uL    Absolute Monocytes 0.8 0.0 - 1.3 10e3/uL    Absolute Eosinophils 0.2 0.0 - 0.7 10e3/uL    Absolute Basophils 0.0 0.0 - 0.2 10e3/uL    Absolute Immature Granulocytes 0.3 <=0.4 10e3/uL    Absolute NRBCs 0.0 10e3/uL   [place] Draw     Status: None    Narrative    The following orders were created for panel order [place] Draw.  Procedure                               Abnormality         Status                     ---------                                -----------         ------                     Extra Blue Top Tube[[id number]]                              Final result               Extra Red Top Tube[[id number]]                               Final result                 Please view results for these tests on the individual orders.   Extra Blue Top Tube     Status: None   Result Value Ref Range    Hold Specimen JIC    Extra Red Top Tube     Status: None   Result Value Ref Range    Hold Specimen JIC    Glucose by meter     Status: Normal   Result Value Ref Range    GLUCOSE BY METER POCT 87 70 - 99 mg/dL   Glucose by meter     Status: Abnormal   Result Value Ref Range    GLUCOSE BY METER POCT 170 (H) 70 - 99 mg/dL   CBC with platelets differential     Status: Abnormal    Narrative    The following orders were created for panel order CBC with platelets differential.  Procedure                               Abnormality         Status                     ---------                               -----------         ------                     CBC with platelets and d...[[id number]]  Abnormal            Final result                 Please view results for these tests on the individual orders.     Medications   diatrizoate meglumine-sodium (GASTROGRAFIN/GASTROVIEW) 66-10 % solution 60 mL (has no administration in time range)   dextrose 50 % injection 50 mL (50 mLs Intravenous Given 8/19/22 1610)   diatrizoate meglumine-sodium (GASTROGRAFIN/GASTROVIEW) 66-10 % solution 30 mL (30 mLs Oral Given 8/19/22 1733)        Assessments & Plan (with Medical Decision Making)   62-year-old female with history of recent lung transplant recently discharged presenting with clogged jejunal tube port interrupting her feeding.  Otherwise she reports she is feeling at her baseline somewhat weak but consistent with how she has been feeling over the last several days since discharge.  Has been follow up postoperative.  Taking all her regular medications, but skipped several  medications due to plugged port this morning.    At bedside was able to perform gentle irrigation resulting in displacement of obstructing contents with jejunal port flushing appropriately.  Jejunal port position and functionality confirmed with x-ray and contrast    Laboratories including renal function and electrolytes near baseline.  Glucose has been stable.  At home however, it was noted that patient's glucose had decreased as she has not been able to properly provide nutrition.  She was given D50 initially in the emergency department, but serial glucose checks have been appropriate    Discussed with patient.  She continues to feel at her baseline and wishes to go home to initiate her tube feeds at home cares as well as rest.  This is appropriate.  She will call her team tomorrow morning to discuss today's events and arrange for follow-up.    - Patient agrees to our plan and is ready and eager for discharge. Care plan, follow up plan, and reasons to return immediately to the ED were dicussed in detail and summarized as noted in the discharge instructions.        I have reviewed the nursing notes. I have reviewed the findings, diagnosis, plan and need for follow up with the patient.    Discharge Medication List as of 8/19/2022  6:59 PM          Final diagnoses:   Obstruction of feeding tube, initial encounter   S/P lung transplant (H)   Generalized muscle weakness       --  Mason Underwood MD  [facility] EMERGENCY DEPARTMENT  8/19/2022     Mason Underwood MD  08/19/22 2002

## 2022-08-19 NOTE — DISCHARGE INSTRUCTIONS
Please call your lung transplant team tomorrow morning to arrange for appropriate follow-up.  Return immediately to the emergency department for any worsening weakness, fever, chills, chest pain, shortness of breath, any difficulty using your feeding tube or keeping up with nutrition, or any other concerns

## 2022-08-19 NOTE — ED TRIAGE NOTES
Triage Assessment     Row Name 08/19/22 1557       Triage Assessment (Adult)    Airway WDL WDL       Respiratory WDL    Respiratory WDL WDL       Skin Circulation/Temperature WDL    Skin Circulation/Temperature WDL WDL       Cardiac WDL    Cardiac WDL rhythm       Peripheral/Neurovascular WDL    Peripheral Neurovascular WDL WDL       Cognitive/Neuro/Behavioral WDL    Cognitive/Neuro/Behavioral WDL WDL

## 2022-08-22 ENCOUNTER — ALLIED HEALTH/NURSE VISIT (OUTPATIENT)
Dept: CARDIOLOGY | Facility: CLINIC | Age: 62
End: 2022-08-22
Payer: MEDICARE

## 2022-08-22 ENCOUNTER — HOME INFUSION (PRE-WILLOW HOME INFUSION) (OUTPATIENT)
Dept: PHARMACY | Facility: CLINIC | Age: 62
End: 2022-08-22

## 2022-08-22 ENCOUNTER — TELEPHONE (OUTPATIENT)
Dept: GASTROENTEROLOGY | Facility: CLINIC | Age: 62
End: 2022-08-22

## 2022-08-22 ENCOUNTER — VIRTUAL VISIT (OUTPATIENT)
Dept: PHARMACY | Facility: CLINIC | Age: 62
End: 2022-08-22
Attending: INTERNAL MEDICINE
Payer: MEDICAID

## 2022-08-22 ENCOUNTER — HOSPITAL ENCOUNTER (OUTPATIENT)
Facility: CLINIC | Age: 62
End: 2022-08-22
Attending: INTERNAL MEDICINE | Admitting: INTERNAL MEDICINE
Payer: MEDICARE

## 2022-08-22 ENCOUNTER — TELEPHONE (OUTPATIENT)
Dept: TRANSPLANT | Facility: CLINIC | Age: 62
End: 2022-08-22

## 2022-08-22 DIAGNOSIS — T38.0X5A STEROID-INDUCED HYPERGLYCEMIA: ICD-10-CM

## 2022-08-22 DIAGNOSIS — E87.5 HYPERKALEMIA: ICD-10-CM

## 2022-08-22 DIAGNOSIS — Z94.2 LUNG REPLACED BY TRANSPLANT (H): Primary | ICD-10-CM

## 2022-08-22 DIAGNOSIS — G89.18 ACUTE POST-OPERATIVE PAIN: ICD-10-CM

## 2022-08-22 DIAGNOSIS — I97.89 POSTOPERATIVE ATRIAL FIBRILLATION (H): ICD-10-CM

## 2022-08-22 DIAGNOSIS — I48.91 POSTOPERATIVE ATRIAL FIBRILLATION (H): ICD-10-CM

## 2022-08-22 DIAGNOSIS — R19.7 DIARRHEA, UNSPECIFIED TYPE: ICD-10-CM

## 2022-08-22 DIAGNOSIS — R73.9 STEROID-INDUCED HYPERGLYCEMIA: ICD-10-CM

## 2022-08-22 PROCEDURE — 99607 MTMS BY PHARM ADDL 15 MIN: CPT | Performed by: PHARMACIST

## 2022-08-22 PROCEDURE — 99605 MTMS BY PHARM NP 15 MIN: CPT | Performed by: PHARMACIST

## 2022-08-22 RX ORDER — ACETAMINOPHEN 500 MG
500 TABLET ORAL EVERY 4 HOURS PRN
Qty: 100 TABLET | Refills: 1 | Status: SHIPPED | OUTPATIENT
Start: 2022-08-22 | End: 2022-08-24

## 2022-08-22 NOTE — TELEPHONE ENCOUNTER
Called patient to check in over the weekend. Pt having no issues with g-tube flushing. Is flushing tube q4h. Reviewed what do if tube clogs again (call RNCC right away, check BG minimum q2h, if BG < 70 treat with a glass of orange juice, recheck in 15 minutes, repeat orange juice if < 70 again, call RNCC)    Charity is care taker with her now. Verbalized understanding.

## 2022-08-22 NOTE — PATIENT INSTRUCTIONS
"Recommendations from today's MTM visit:                                                    1. Start using Nystatin 5 mL at least 3-4 times daily.   2. [facility] mail order will call you three weeks post discharge, but if your dose changes, call the number on the back of the pill box to talk to them earlier, [phone]. If your doctor sends over a new prescription to the mail order pharmacy you will have to call them to set up the order. They have an Amilcar called \"My [facility] RX\" as well.   3. Start Acetaminophen 500mg every 4 hours. Max dose is 3000mg per day.   4. Sent over Glucose tablets for you to use if you are having lows. Check blood sugars after 15 minutes, if still below 80, take another dose. Continue until blood sugars are over 80 and follow-up with a small meal or snack.     Follow-up: 2-3 months    It was great speaking with you today.  I value your experience and would be very thankful for your time in providing feedback in our clinic survey. In the next few days, you may receive an email or text message from [facility] with a link to a survey related to your  clinical pharmacist.\"     To schedule another MTM appointment, please call the clinic directly or you may call the MTM scheduling line at [phone] or toll-free at [phone].     My Clinical Pharmacist's contact information:                                                      Please feel free to contact me with any questions or concerns you have.      Yung Rivera, PharmD  MTM Pharmacist    Phone: [phone]     "

## 2022-08-22 NOTE — PROGRESS NOTES
"Medication Therapy Management (MTM) Encounter    ASSESSMENT:                            Medication Adherence/Access: No issues identified    Lung Transplant: Patient has not been her first prophylaxis, nystatin.  We will start using this at least 3-4 times daily as she does not like the taste.    Steroid induced hyperglycemia: Majority of blood sugars are less than 200 and nearer to 150 while on prednisone taper.  No changes by MTM.  Patient did want some glucose tabs for lows, I sent those over for her per CPA.    Diarrhea: P diarrhea likely from tube feeding, will discuss with dietitian about adding fiber to formulation.    Hyperkalemia: Stable.    Pain: Patient concerned that Tylenol clogged her feeding tube.  Will write a prescription for the tablets which she can crush.    PLAN:                            1. Start using Nystatin 5 mL at least 3-4 times daily.   2. [facility] order will call you three weeks post discharge, but if your dose changes, call the number on the back of the pill box to talk to them earlier, [phone]. If your doctor sends over a new prescription to the mail order pharmacy you will have to call them to set up the order. They have an Amilcar called \"My [facility] RX\" as well.   3. Start Acetaminophen 500mg every 4 hours. Max dose is 3000mg per day.   4. Sent over Glucose tablets for you to use if you are having lows. Check blood sugars after 15 minutes, if still below 80, take another dose. Continue until blood sugars are over 80 and follow-up with a small meal or snack.     Kera Yanez. FYI Patient having frequent diarrhea, may need to icnrease fiber in tube feeding formulation.     Follow-up: 2-3 months.     SUBJECTIVE/OBJECTIVE:                          Sofie Rodriguez is a 62 year old female called for a transitions of care visit. She was discharged from [facility] on 8/17 for lung transplant. Patient was accompanied by Daughter.     Reason for visit: initial post transplant med " review.    Allergies/ADRs: Reviewed in chart  Past Medical History: Reviewed in chart, has a feeding G-J tube. Meds through G tube, tube feeding 24 hours.  Tobacco: She reports that she quit smoking about 21 months ago. Her smoking use included cigarettes. She quit after 30.00 years of use. She has never used smokeless tobacco.  Alcohol: not currently using    Medication Adherence/Access: Patient uses pill box(es) and has assistance with medication administration: family member, children are helping her and setting up the meds.  Patient takes medications 3 time(s) per day. 8am and 8pm, midday insulins, flushing tubes,   Per patient, misses medication 0 times per week.   Medication barriers: none.   The patient fills medications at [facility]: YES.    Lung Transplant:  Current immunosuppressants include Mycophenolate Mofetil 750mg (3.75mL) twice daily, Prednisone 10mg twice daily, and Tacrolimus 3.5mg (3.5mL) BID.  Pt reports Tremors mild. She lays down for awhile after taking medications, but no vomiting or nausea.   Vascular prophylaxis: Aspirin 81mg daily, denies GIB sx.   Transplant date: 6/28/22  Estimated Creatinine Clearance: 37.8 mL/min (A) (based on SCr of 1.45 mg/dL (H)).   CMV prophylaxis: CrCl 25 to 39 mL/minute: Valcyte 450 mg every other day Treat for 3 months.  PJP prophylaxis: Dapsone 50mg three times per week, Bactrim likely stopped due to potassium.   Thrush prophylaxis:has not been using Nystatin.  PPI use: Pantoprazole 40mg (20mL) twice daily. Denies GERD sx.   Supplements: Multivitamin 15mL daily, Calcium/D twice daily.   Tx Coordinator: Girma Sharpe, Using Med Card: Yes  Immunizations: annual flu shot 2021; [id number]:  2013, 2021; Prevnar 13/15/20: 2015; TDaP:  2015; Shingrix: 2021x2, HBV: immunity per last titer, COVID: Pfizer-BioNTech x4    Steroid induced hyperglycemia:  Currently taking Basaglar 7 units twice daily, Novolog sliding scale ever four hours (140-164 1 units, 165-189 2 units  etc). Patient is not experiencing side effects.  Blood sugar monitoring: every 4 hours. Ranges (patient reported):   Symptoms of low blood sugar? None at 60-65 so far.   Symptoms of high blood sugar? none  Diet/Exercise: Tube feeding 24 hours, very little solid foods (apple sauce and ice cream couple bites)  Lab Results   Component Value Date    A1C 5.8 06/28/2022    A1C 5.1 11/13/2020     Date FBG/ 2hours post Lunch/2hours post Dinner /2hours post Overnight   8/22 173      8/21 154 130/153 150 112   8/20 200 119,160 160 205   8/19 (feeding tube clogged) 170 60 62 199     Diarrhea: Pt has not had a solid stool, 3-4 watery stools daily.     Hyperkalemia: Pt is taking Fludrocortisone 0.1mg daily   Latest Reference Range & Units 08/11/22 06:10 08/11/22 09:41 08/12/22 07:36 08/13/22 08:41 08/14/22 06:53 08/16/22 14:17 08/17/22 07:06 08/19/22 16:07   Potassium 3.4 - 5.3 mmol/L 5.4 (H) 4.5 5.2 5.2 4.2 4.8 5.0 3.7   (H): Data is abnormally high    Pain: Pt is taking Oxycodone 5-10mg twice daily, Acetaminophen not really using it as she believes it clogged her feeding tube. Incisional pain and back pain 7-8/10    Today's Vitals: There were no vitals taken for this visit.  ----------------  Post Discharge Medication Reconciliation Status: discharge medications reconciled and changed, per note/orders.    I spent 40 minutes with this patient today. All changes were made via collaborative practice agreement with Kaylin Triana. A copy of the visit note was provided to the patient's provider(s).    The patient was sent via [employer] a summary of these recommendations.     Yung Rivera PharmD  [facility] Pharmacist    Phone: [phone]     Telemedicine Visit Details  Type of service:  Telephone visit  Start Time: 8 AM  End Time: 8:40 AM  Originating Location (patient location): Home  Distant Location (provider location):  [facility] MTM     Medication Therapy Recommendations  Acute post-operative pain    Current  Medication: acetaminophen (TYLENOL) 160 MG/5ML liquid   Rationale: Undesirable effect - Adverse medication event - Safety   Recommendation: Change Medication Formulation    Status: Accepted per CPA         Lung replaced by transplant (H)    Current Medication: nystatin (MYCOSTATIN) 309182 UNIT/ML suspension   Rationale: Patient prefers not to take - Adherence - Adherence   Recommendation: Provide Adherence Intervention   Status: Patient Agreed - Adherence/Education         Steroid-induced hyperglycemia    Current Medication: insulin aspart (NOVOLOG PEN) 100 UNIT/ML pen   Rationale: Undesirable effect - Adverse medication event - Safety   Recommendation: Start Medication - Glucose Management Tabs   Status: Accepted per CPA

## 2022-08-22 NOTE — TELEPHONE ENCOUNTER
Screening Questions  BlueKIND OF PREP RedLOCATION [review exclusion criteria] GreenSEDATION TYPE      1.  Yes Are you active on mychart?       2.  Tenzin Arroyo MD Ordering/Referring Provider:      3. Medicare/medicaid  What insurance is in the chart?    4.  y Do you have a legal guardian or medical Power of ?              Are you able to give consent for your medical care?                (Sedation review/consideration needed)      5.   28.17  BMI  [BMI OVER 40-EXTENDED PREP]  If greater than 40 review exclusion criteria [PAC APPT IF @ [facility]]       6.   n Have you had a positive covid test in the last 90 days?      7.   Respiratory Screening :  [If yes to any of the following HOSPITAL setting only]                     y Do you use daily home oxygen?   n Do you have mod to severe Obstructive Sleep Apnea?  [OKAY @ [facility] SH PH RI]                  n Do you have Pulmonary Hypertension?                   n Do you have UNCONTROLLED asthma?         8.   yes lung Have you had a heart or lung transplant?       9.   n Are you currently on dialysis?   [ If yes, G-PREP & HOSPITAL setting only]      10.   n  Do you have chronic kidney disease?  [ If yes, G-PREP ]     11.  n Have you had a stroke or Transient ischemic attack (TIA - aka  mini stroke ) within 6 months?   (If yes, please review exclusion criteria)     12.   n- afib In the past 6 months, have you had any heart related issues including cardiomyopathy or heart attack?           n If yes, did it require cardiac stenting or other implantable device?       13.   n Do you have any implantable devices in your body (pacemaker, defib, LVAD)?  (If yes, please review exclusion criteria)     14.   n Do you take nitroglycerin?            n If yes, how often? n  (if yes, HOSPITAL setting ONLY)     15.   asprin Are you currently taking any blood thinners?           [IF YES, INFORM PATIENT TO FOLLOW UP W/ ORDERING PROVIDER FOR BRIDGING INSTRUCTIONS]      16.     y  Do you have a diagnosis of diabetes?  [ If yes, G-PREP ]     17.   [FEMALES] n Are you currently pregnant?    n If yes, how many weeks?      18.    y  Are you taking any prescription pain medications on a routine schedule?    [ If yes, EXTENDED PREP.] [If yes, MAC]    22.  Do you take the medication Phentermine?     Yes-> Hold for 7 days before procedure.  Please consult your prescribing provider if you have questions about holding this medication.     No-> Continue to next question.     19.   n Do you have any chemical dependencies such as alcohol, street drugs, or methadone?   [If yes, MAC]     20.   n Do you have any history of post-traumatic stress syndrome, severe anxiety or history of psychosis?   [If yes, MAC]     21.   y Do you transfer independently?       22.   n  On a regular basis do you go 3-5 days between bowel movements?  [ If yes, EXTENDED PREP.]     23.    Preferred LOCAL Pharmacy for Pre Prescription       [facility] [address]            Scheduling Details         Sofie : Caller   (Please ask for phone number if not scheduled by patient)    Upper Endoscopy [EGD] : Type of Procedure Scheduled    Which Colonoscopy Prep was Sent?      Amadeo Surgeon    10/13 Date of Procedure   [facility] Location    MAC Sedation Type     Conscious Sedation- Needs  for 6 hours after the procedure  MAC/General-Needs  for 24 hours after procedure     YES - PAC appointment sched Pre-op Required at [facility], [place], [place] and OR for MAC sedation   (advise patient they will need a pre-op prior to procedure -)       y Informed patient they will need an adult    Cannot take any type of public or medical transportation alone     HOME Pre-Procedure Covid test to be completed at [facility] or Externally  [[facility] PCR Testing Required]     y  Confirmed Nurse will call to complete assessment     Additional comments:

## 2022-08-23 ENCOUNTER — TELEPHONE (OUTPATIENT)
Dept: TRANSPLANT | Facility: CLINIC | Age: 62
End: 2022-08-23

## 2022-08-23 DIAGNOSIS — Z94.2 LUNG REPLACED BY TRANSPLANT (H): Primary | ICD-10-CM

## 2022-08-23 LAB — BACTERIA PLR CULT: NO GROWTH

## 2022-08-23 NOTE — TELEPHONE ENCOUNTER
Called Sofie per request of txp coordinator. Plan to increase PO intake slowly, but continue on TF until EGD 10/6. Also received message from PharmD that pt has diarrhea.     Was in ED 8/19 d/t clogged FT. Was able to unclog tube and placement was verified in jejunum.     Discharge TF regimen: Novasource Renal via J tube @ 35 ml/hr x 24 h + 1 pkt Prosource = 1720 calories, 87 g protein, 154 g cho, 602 ml free water, and 0 g fiber daily. Pt is also on insulin.     Labs: K wnl x 10 days; Phos wnl mostly with a few high levels     Wt Readings from Last 10 Encounters:   08/18/22 69.9 kg (154 lb)   08/16/22 70.5 kg (155 lb 6.4 oz)   04/29/22 63.5 kg (140 lb)   11/11/21 62.6 kg (138 lb)   06/30/21 67.4 kg (148 lb 11.2 oz)   06/30/21 61.2 kg (135 lb)   06/15/21 61.2 kg (135 lb)   03/26/21 57.8 kg (127 lb 6.4 oz)   02/03/21 54.4 kg (120 lb)   06/09/17 56.5 kg (124 lb 9 oz)     Left Sofie HOROWITZ to call back.

## 2022-08-23 NOTE — TELEPHONE ENCOUNTER
FUTURE VISIT INFORMATION      SURGERY INFORMATION:    Date: 10/13/22    Location:  gi    Surgeon:  Corinna Childs MD    Anesthesia Type:  MAC    Procedure: ESOPHAGOGASTRODUODENOSCOPY (EGD)    RECORDS REQUESTED FROM:       Primary Care Provider: Vinny Berrios MD  - [facility]    Pertinent Medical History: COPD, Paroxysmal A-Fib    Most recent EKG+ Tracin22    Most recent ECHO: 22    Most recent Coronary Angiogram: 21    Most recent PFT's: 22

## 2022-08-23 NOTE — PROGRESS NOTES
Transplant Coordinator Note    Reason for visit: Post lung transplant follow up visit   Coordinator: Present   Caregiver: Charity, daughter, present    Health concerns addressed today:  1. ENT: at baseline   2. Respiratory: Coughing at night, mostly during breathing exercises. Has sputum every once in a while, clear in color. Does not feel congested.   3. GI: Denies nausea. Having formed stools now.   4. Mood: At baseline.   5. Hemoglobin down today, no s/s of bleeding from patient.     Activity/rehab: home OT/PT until ready for pulmonology   Oxygen needs: 1L O2  Pain management/RX: Incisional pain- feels tight. Sharp pains at times. Use your tylenol as needed.   Diabetic management: on insulin/lantus  Next Bronch due: POD 60  Risk Criteria Labs: negative 7/28/22  CMV status: D+/R+  Valcyte stopped: POD 8-90  EBV status: D+/R+  AC/asa:  On ASA 81mg  PJP prophylactic: Dapsone    COVID:  1. COVID-19 infection (yes/no, date of most recent positive test):   2. Status/instructions given about COVID-19 vaccine: Vaccinated, booster x2 last 3/21/22. Has not received Evusheld- due     Pt Education: medications (use/dose/side effects), how/when to call coordinator, frequency of labs, s/s of infection/rejection, call prior to starting any new medications, lab/vital sign book    Health Maintenance:     Last colonoscopy: 2015?    Next colonoscopy due: ?    Dermatology: ?    Vaccinations this visit:     Labs, CXR, PFTs reviewed with patient  Medication record reviewed and reconciled  Questions and concerns addressed    Patient Instructions  1. Continue to hydrate with 60-70 oz fluids daily.  2. Continue to exercise daily or most days of the week.  3. Follow up with your primary care provider for annual gender health maintenance procedures.  4. Follow up with colonoscopy schedule.  5. Follow up with annual dermatology visits.  6. It doesn't seem like the COVID vaccine is working well in lung transplant patients. A number of lung  transplant patients have gotten sick with COVID even after receiving the vaccines.  Based on our recent experience, it can be life-threatening to get COVID  even after being vaccinated. Please continue to act like you did not get the COVID vaccine - social distancing, wearing a mask, good hand hygiene, etc. If the people around you are vaccinated, it will help reduce the risk of you getting COVID. All members of your household should be vaccinated.  7. You can try taking your oxygen off when you are at rest, make sure your oxygen saturations are above 92%.  8. Your lung function went down a little bit, we would like to collect a sputum sample.   9. We are going to try and get you off the oxycodone.   10. Do not do more than a full liquid diet for now. Call Kera DAS (the dietician) to talk about your tube feedings.   11. You can take 1,000mg of tylenol two times a day. Take this as scheduled.   12. We are going to have you hold your Dapsone. This could be why your hemoglobin is low. We will reassess this next week when you get labs draw.   13. I will have Yung, pharmacist, follow up with you guys about checking blood sugars every 4 hours. We will try and decrease this .  14. You got Evusheld today. Let us know if you have any side effects from this.   15. Try not to nap during the day if you can.     Next transplant clinic appointment:  with CXR, labs and PFTs  Next lab draw: next week       AVS printed at time of check out

## 2022-08-23 NOTE — TELEPHONE ENCOUNTER
Family called concerned that the feeding tube was plugged and they haven't gotten in all the evening meds yet. Error/no flow on TF.     Consulted Lung Coordinator Tierney MONZON who coached the family and helped them unplug the tube by plugging back and forth.     Family grateful and happy issue resolved.

## 2022-08-24 ENCOUNTER — LAB (OUTPATIENT)
Dept: LAB | Facility: CLINIC | Age: 62
End: 2022-08-24
Payer: MEDICARE

## 2022-08-24 ENCOUNTER — ANCILLARY PROCEDURE (OUTPATIENT)
Dept: CARDIOLOGY | Facility: CLINIC | Age: 62
End: 2022-08-24
Attending: PHYSICIAN ASSISTANT
Payer: MEDICARE

## 2022-08-24 ENCOUNTER — TELEPHONE (OUTPATIENT)
Dept: TRANSPLANT | Facility: CLINIC | Age: 62
End: 2022-08-24

## 2022-08-24 ENCOUNTER — ANCILLARY PROCEDURE (OUTPATIENT)
Dept: GENERAL RADIOLOGY | Facility: CLINIC | Age: 62
End: 2022-08-24
Payer: MEDICARE

## 2022-08-24 ENCOUNTER — OFFICE VISIT (OUTPATIENT)
Dept: PULMONOLOGY | Facility: CLINIC | Age: 62
End: 2022-08-24
Attending: PHYSICIAN ASSISTANT
Payer: MEDICARE

## 2022-08-24 VITALS
BODY MASS INDEX: 27.46 KG/M2 | HEART RATE: 94 BPM | SYSTOLIC BLOOD PRESSURE: 129 MMHG | OXYGEN SATURATION: 95 % | HEIGHT: 63 IN | WEIGHT: 155 LBS | DIASTOLIC BLOOD PRESSURE: 77 MMHG

## 2022-08-24 DIAGNOSIS — Z94.2 LUNG REPLACED BY TRANSPLANT (H): Primary | ICD-10-CM

## 2022-08-24 DIAGNOSIS — Z94.2 LUNG REPLACED BY TRANSPLANT (H): ICD-10-CM

## 2022-08-24 DIAGNOSIS — D64.9 ANEMIA, UNSPECIFIED TYPE: Primary | ICD-10-CM

## 2022-08-24 DIAGNOSIS — Z94.2 S/P LUNG TRANSPLANT (H): Chronic | ICD-10-CM

## 2022-08-24 DIAGNOSIS — M79.89 SWELLING OF LEFT LOWER EXTREMITY: Primary | ICD-10-CM

## 2022-08-24 DIAGNOSIS — D64.9 ANEMIA, UNSPECIFIED TYPE: ICD-10-CM

## 2022-08-24 DIAGNOSIS — R52 GENERALIZED PAIN: ICD-10-CM

## 2022-08-24 LAB
ACID FAST STAIN (ARUP): NORMAL
ALBUMIN SERPL-MCNC: 2.2 G/DL (ref 3.4–5)
ALP SERPL-CCNC: 134 U/L (ref 40–150)
ALT SERPL W P-5'-P-CCNC: 22 U/L (ref 0–50)
ANION GAP SERPL CALCULATED.3IONS-SCNC: 5 MMOL/L (ref 3–14)
AST SERPL W P-5'-P-CCNC: 16 U/L (ref 0–45)
BILIRUB DIRECT SERPL-MCNC: 0.1 MG/DL (ref 0–0.2)
BILIRUB SERPL-MCNC: 0.2 MG/DL (ref 0.2–1.3)
BUN SERPL-MCNC: 56 MG/DL (ref 7–30)
CALCIUM SERPL-MCNC: 9 MG/DL (ref 8.5–10.1)
CHLORIDE BLD-SCNC: 96 MMOL/L (ref 94–109)
CO2 SERPL-SCNC: 42 MMOL/L (ref 20–32)
CREAT SERPL-MCNC: 1.27 MG/DL (ref 0.52–1.04)
ERYTHROCYTE [DISTWIDTH] IN BLOOD BY AUTOMATED COUNT: 16.4 % (ref 10–15)
EXPTIME-PRE: 4.81 SEC
FEF2575-%PRED-PRE: 124 %
FEF2575-PRE: 2.78 L/SEC
FEF2575-PRED: 2.22 L/SEC
FEFMAX-%PRED-PRE: 69 %
FEFMAX-PRE: 4.25 L/SEC
FEFMAX-PRED: 6.14 L/SEC
FERRITIN SERPL-MCNC: 334 NG/ML (ref 8–252)
FEV1-%PRED-PRE: 48 %
FEV1-PRE: 1.17 L
FEV1FEV6-PRE: 95 %
FEV1FEV6-PRED: 81 %
FEV1FVC-PRE: 95 %
FEV1FVC-PRED: 79 %
FIFMAX-PRE: 3.09 L/SEC
FVC-%PRED-PRE: 40 %
FVC-PRE: 1.23 L
FVC-PRED: 3.06 L
GFR SERPL CREATININE-BSD FRML MDRD: 48 ML/MIN/1.73M2
GLUCOSE BLD-MCNC: 126 MG/DL (ref 70–99)
HCT VFR BLD AUTO: 25.6 % (ref 35–47)
HGB BLD-MCNC: 7.6 G/DL (ref 11.7–15.7)
IRON SATN MFR SERPL: 21 % (ref 15–46)
IRON SERPL-MCNC: 41 UG/DL (ref 35–180)
MAGNESIUM SERPL-MCNC: 2.6 MG/DL (ref 1.6–2.3)
MCH RBC QN AUTO: 29.9 PG (ref 26.5–33)
MCHC RBC AUTO-ENTMCNC: 29.7 G/DL (ref 31.5–36.5)
MCV RBC AUTO: 101 FL (ref 78–100)
PLATELET # BLD AUTO: 294 10E3/UL (ref 150–450)
POTASSIUM BLD-SCNC: 4.1 MMOL/L (ref 3.4–5.3)
PROT SERPL-MCNC: 6 G/DL (ref 6.8–8.8)
RBC # BLD AUTO: 2.54 10E6/UL (ref 3.8–5.2)
RETICS # AUTO: 0.07 10E6/UL (ref 0.03–0.1)
RETICS/RBC NFR AUTO: 2.6 % (ref 0.5–2)
SODIUM SERPL-SCNC: 143 MMOL/L (ref 133–144)
TACROLIMUS BLD-MCNC: 9.6 UG/L (ref 5–15)
TIBC SERPL-MCNC: 196 UG/DL (ref 240–430)
TME LAST DOSE: NORMAL H
TME LAST DOSE: NORMAL H
VIT B12 SERPL-MCNC: 536 PG/ML (ref 193–986)
WBC # BLD AUTO: 7.8 10E3/UL (ref 4–11)

## 2022-08-24 PROCEDURE — 71046 X-RAY EXAM CHEST 2 VIEWS: CPT | Performed by: RADIOLOGY

## 2022-08-24 PROCEDURE — 80053 COMPREHEN METABOLIC PANEL: CPT | Performed by: PATHOLOGY

## 2022-08-24 PROCEDURE — G0463 HOSPITAL OUTPT CLINIC VISIT: HCPCS | Mod: 25

## 2022-08-24 PROCEDURE — 83735 ASSAY OF MAGNESIUM: CPT | Performed by: PATHOLOGY

## 2022-08-24 PROCEDURE — 83550 IRON BINDING TEST: CPT | Performed by: PATHOLOGY

## 2022-08-24 PROCEDURE — 94375 RESPIRATORY FLOW VOLUME LOOP: CPT | Performed by: PHYSICIAN ASSISTANT

## 2022-08-24 PROCEDURE — 93246 EXT ECG>7D<15D RECORDING: CPT

## 2022-08-24 PROCEDURE — 82248 BILIRUBIN DIRECT: CPT | Performed by: PATHOLOGY

## 2022-08-24 PROCEDURE — 80197 ASSAY OF TACROLIMUS: CPT | Performed by: INTERNAL MEDICINE

## 2022-08-24 PROCEDURE — 87799 DETECT AGENT NOS DNA QUANT: CPT | Performed by: INTERNAL MEDICINE

## 2022-08-24 PROCEDURE — 99215 OFFICE O/P EST HI 40 MIN: CPT | Mod: 24 | Performed by: PHYSICIAN ASSISTANT

## 2022-08-24 PROCEDURE — 36415 COLL VENOUS BLD VENIPUNCTURE: CPT | Performed by: PATHOLOGY

## 2022-08-24 PROCEDURE — 85045 AUTOMATED RETICULOCYTE COUNT: CPT | Performed by: PATHOLOGY

## 2022-08-24 PROCEDURE — 250N000011 HC RX IP 250 OP 636: Performed by: PHYSICIAN ASSISTANT

## 2022-08-24 PROCEDURE — 85027 COMPLETE CBC AUTOMATED: CPT | Performed by: PATHOLOGY

## 2022-08-24 PROCEDURE — M0220 HC INJECTION TIXAGEVIMAB & CILGAVIMAB (EVUSHELD): HCPCS | Performed by: PHYSICIAN ASSISTANT

## 2022-08-24 PROCEDURE — 93248 EXT ECG>7D<15D REV&INTERPJ: CPT | Performed by: INTERNAL MEDICINE

## 2022-08-24 PROCEDURE — 82728 ASSAY OF FERRITIN: CPT | Performed by: PATHOLOGY

## 2022-08-24 PROCEDURE — 82607 VITAMIN B-12: CPT | Performed by: PHYSICIAN ASSISTANT

## 2022-08-24 RX ORDER — ACETAMINOPHEN 160 MG/5ML
1000 LIQUID ORAL 2 TIMES DAILY
Qty: 1200 ML | Refills: 3 | COMMUNITY
Start: 2022-08-24 | End: 2022-08-29

## 2022-08-24 RX ORDER — OXYCODONE HYDROCHLORIDE 5 MG/1
5 TABLET ORAL EVERY 8 HOURS PRN
Qty: 15 TABLET | Refills: 0 | Status: SHIPPED | OUTPATIENT
Start: 2022-08-24 | End: 2022-10-24

## 2022-08-24 RX ADMIN — AZD7442 6 ML: KIT at 10:48

## 2022-08-24 ASSESSMENT — PAIN SCALES - GENERAL: PAINLEVEL: NO PAIN (0)

## 2022-08-24 NOTE — RESULT ENCOUNTER NOTE
Tacrolimus level 9.6 at 12 hours, on 8/24/22.  Goal 8-12.   Current dose 3.5 mg in AM, 3.5 mg in PM    Level at goal.  No dose change.    [employer] message sent

## 2022-08-24 NOTE — TELEPHONE ENCOUNTER
Provider Call: General  Route to LPN    Reason for call: they received orders re Pulmonary Rehab- If pt is going for Pulmonary Therapy she would not be considered home bound and they would have to stop her homecare   Wonders if pulm rehab can wait abit    Call back needed? Yes    Return Call Needed  Same as documented in contacts section  When to return call?: Greater than one day: Route standard priority

## 2022-08-24 NOTE — PATIENT INSTRUCTIONS
Patient Instructions  1. Continue to hydrate with 60-70 oz fluids daily.  2. Continue to exercise daily or most days of the week.  3. Follow up with your primary care provider for annual gender health maintenance procedures.  4. Follow up with colonoscopy schedule.  5. Follow up with annual dermatology visits.  6. It doesn't seem like the COVID vaccine is working well in lung transplant patients. A number of lung transplant patients have gotten sick with COVID even after receiving the vaccines.  Based on our recent experience, it can be life-threatening to get COVID  even after being vaccinated. Please continue to act like you did not get the COVID vaccine - social distancing, wearing a mask, good hand hygiene, etc. If the people around you are vaccinated, it will help reduce the risk of you getting COVID. All members of your household should be vaccinated.  7. You can try taking your oxygen off when you are at rest, make sure your oxygen saturations are above 92%.  8. Your lung function went down a little bit, we would like to collect a sputum sample.   9. We are going to try and get you off the oxycodone.   10. Do not do more than a full liquid diet for now. Call Kera DAS (the dietician) to talk about your tube feedings.   11. You can take 1,000mg of tylenol two times a day. Take this as scheduled.   12. We are going to have you hold your Dapsone. This could be why your hemoglobin is low. We will reassess this next week when you get labs draw.   13. I will have Yung, pharmacist, follow up with you guys about checking blood sugars every 4 hours. We will try and decrease this .  14. You got Evusheld today. Let us know if you have any side effects from this.   15. Try not to nap during the day if you can.     Next transplant clinic appointment:  with CXR, labs and PFTs  Next lab draw: next week       ~~~~~~~~~~~~~~~~~~~~~~~~~    Thoracic Transplant Office phone [phone], fax [phone]    Office Hours 8:30 -  5:00     For after-hours urgent issues, please dial [phone], and ask to speak with the Thoracic Transplant Coordinator On-Call.  --------------------  To expedite your medication refill(s), please contact your pharmacy and have them fax a refill request to: [phone]  .   *Please allow 3 business days for routine medication refills.  *Please allow 5 business days for controlled substance medication refills.    **For Diabetic medications and supplies refill(s), please contact your pharmacy and have them contact your Endocrine team.  --------------------  For scheduling appointments call [phone].  --------------------  Please Note: If you are active on [employer], all future test results will be sent by [employer] message only, and will no longer be called to patient. You may also receive communication directly from your physician.  f

## 2022-08-24 NOTE — PROGRESS NOTES
Per Dr. Triana, patient to have ZIO monitor placed.  Diagnosis: Lung replaced by transplant  Monitor placed: Yes  Patient Instructed: Yes  Patient verbalized understanding: Yes  Holter # [id number]

## 2022-08-24 NOTE — LETTER
PHYSICIAN ORDERS      DATE & TIME ISSUED: 2022 3:53 PM  PATIENT NAME: Sofie Rodriguez   : 1960     [facility] MR# [if applicable]: [id number]     DIAGNOSIS:  Lung Transplant  Z94.2  Please cancel home PT/OT order. Pt no longer needing these services.     Keep home RN services.       Any questions please call: Girma [phone]    Please fax these results to [phone].        Kaylin Triana PA-C

## 2022-08-24 NOTE — LETTER
8/24/2022         RE: Sofie Rodriguez  [address] [place] [address]        Dear Colleague,    Thank you for referring your patient, Sofie Rodriguez, to the [facility] [place]. Please see a copy of my visit note below.    Transplant Coordinator Note    Reason for visit: Post lung transplant follow up visit   Coordinator: Present   Caregiver: Charity, daughter, present    Health concerns addressed today:  1. ENT: at baseline   2. Respiratory: Coughing at night, mostly during breathing exercises. Has sputum every once in a while, clear in color. Does not feel congested.   3. GI: Denies nausea. Having formed stools now.   4. Mood: At baseline.   5. Hemoglobin down today, no s/s of bleeding from patient.     Activity/rehab: home OT/PT until ready for pulmonology   Oxygen needs: 1L O2  Pain management/RX: Incisional pain- feels tight. Sharp pains at times. Use your tylenol as needed.   Diabetic management: on insulin/lantus  Next Bronch due: POD 60  Risk Criteria Labs: negative 7/28/22  CMV status: D+/R+  Valcyte stopped: POD 8-90  EBV status: D+/R+  AC/asa:  On ASA 81mg  PJP prophylactic: Dapsone    COVID:  1. COVID-19 infection (yes/no, date of most recent positive test):   2. Status/instructions given about COVID-19 vaccine: Vaccinated, booster x2 last 3/21/22. Has not received Evusheld- due     Pt Education: medications (use/dose/side effects), how/when to call coordinator, frequency of labs, s/s of infection/rejection, call prior to starting any new medications, lab/vital sign book    Health Maintenance:     Last colonoscopy: 2015?    Next colonoscopy due: ?    Dermatology: ?    Vaccinations this visit:     Labs, CXR, PFTs reviewed with patient  Medication record reviewed and reconciled  Questions and concerns addressed    Patient Instructions  1. Continue to hydrate with 60-70 oz fluids daily.  2. Continue to exercise daily or most days of the week.  3.  Follow up with your primary care provider for annual gender health maintenance procedures.  4. Follow up with colonoscopy schedule.  5. Follow up with annual dermatology visits.  6. It doesn't seem like the COVID vaccine is working well in lung transplant patients. A number of lung transplant patients have gotten sick with COVID even after receiving the vaccines.  Based on our recent experience, it can be life-threatening to get COVID  even after being vaccinated. Please continue to act like you did not get the COVID vaccine - social distancing, wearing a mask, good hand hygiene, etc. If the people around you are vaccinated, it will help reduce the risk of you getting COVID. All members of your household should be vaccinated.  7. You can try taking your oxygen off when you are at rest, make sure your oxygen saturations are above 92%.  8. Your lung function went down a little bit, we would like to collect a sputum sample.   9. We are going to try and get you off the oxycodone.   10. Do not do more than a full liquid diet for now. Call Kera DAS (the dietician) to talk about your tube feedings.   11. You can take 1,000mg of tylenol two times a day. Take this as scheduled.   12. We are going to have you hold your Dapsone. This could be why your hemoglobin is low. We will reassess this next week when you get labs draw.   13. I will have Yung, pharmacist, follow up with you guys about checking blood sugars every 4 hours. We will try and decrease this .  14. You got Evusheld today. Let us know if you have any side effects from this.   15. Try not to nap during the day if you can.     Next transplant clinic appointment:  with CXR, labs and PFTs  Next lab draw: next week       AVS printed at time of check out          [facility]  August 24, 2022         Assessment and Plan:   Sofie Rodriguez is a 62 year old female with a PMH significant for end-stage COPD,  HTN, HFpEF, Mycobacterium peregrinum colonization, h/o hepatitis C and former methamphetamine use s/p BSLT on 6/28/22 (lungs slightly undersized) with persistent hypercapnia. Post-operative course complicated by bilateral pleural effusions, left radial artery thrombus (presumed secondary to arterial line) s/p thrombectomy 7/2, BACILIO, C diff, gastroparesis s/p GJ tube placement in IR 7/27, s/p EGD 8/3 with pyloric ulcer noted, melena with hgb drop (8/8), elevated LFTs (8/8) with extrahepatic mass on US and MRCP with increase in biliary dilation. S/p ERCP 8/11 with findings concerning for hemobilia, but no bleeding from ulcer in pyloric channel. She was seen in the ED last week for a clogged tube. This is her second post discharge follow up.      1. S/p bilateral sequential lung transplant (BSLT) for end stage COPD:  Persistent hypercapneic respiratory failure:   Bilateral hydroPTX:   Right hemidiaphragm palsy: persistent hypercapnia without dyspnea. Sniff 7/14 notable for right hemidiaphragm palsy. NIPPV initially overnight for persistent hypercapnia, stopped 8/3 given lack of improvement with therapy, compensated hypercapnia persists. CTA abdomen 8/11 noted similar appearance of bilateral loculated moderate pleural effusions with adjacent compressive atelectasis and LL predominant interlobular septal thickening. S/p right diagnostic thoracentesis 8/12 with 100 ml removed. Notes slight increase in productive cough, continues to use 1L O2. CMV 8/18 negative. CXR reviewed today demonstrates stable left effusion. PFTs are down slightly from her initial post transplant, but did not meet ATS guidelines for FVC (lower than expected thought secondary to diaphragm palsy and possible left effusion).   - Sputum sample if able  - Has left thoracentesis scheduled for 8/29  - VBG with next labs, benefit from bipap?  - Overnight O2 study and 6 MWT in the future  - Needs to start pulmonary rehab (not home  PT/OT)     Immunosuppression:  Induction therapy with basiliximab (and high dose IV steroid)  - Tacrolimus 4 mg BID (via J tube)  Goal level 8-12  -  mg BID (increased 8/7, prior decrease for GI symptoms/leukopenia)   - Prednisone taper per below    Date AM dose (mg) PM dose (mg)   8/18/22 10 10   9/15/22 10 7.5   10/13/22 7.5 7.5   11/10/22 7.5 5   12/8/22 5 5   1/5/23 5 2.5     - HOLD dapsone giving worsening anemia (Bactrim stopped for hyperkalemia), may need to consider pentamidine prophylaxis  - VGCV for CMV ppx x 3 months (to end on 9/28)  - Nystatin for oral candidiasis ppx X 6 month course      Donor Recipient Intervention   CMV status Positive Positive Valganciclovir POD #8-90   EBV status Positive Positive EBV monitoring as below   HSV status N/A Positive Not indicated       2. Positive DSA: newly positive DSA on 8/10 with repeat on 8/15 increased from 2155 to 3584.  - DSA ordered for 8/29     3. H/o M. peregrinum colonization: NGTD post-transplant.  - AFB to be sent on all future bronchs     4. H/o hepatitis C: diagnosed in 1980s s/p 2m treatment, last positive 2/20/17 ([phone]).  Ab positive on 6/2021 with negative HCV PCR.  H/o remote EtOH abuse.  MR elastography (4/27/21) with hepatology review and consult without any concerns post transplant. Hepatitis C RNA negative and hepatitis C antibody positive (old) on 6/28.  Repeat hepatitis C RNA negative 8/8 in setting of elevated LFTs.     5. EBV viremia: last level of 1501 (low 3.2) on 8/8  - EBV monthly due 9/8     6. C diff infection: positive 7/11. S/p PO vancomycin (7/11-7/28).  Repeat C diff negative 8/6.  Low threshold to resume vancomycin in the future with ABX course. Stools are stable.       7. Hypogammaglobulinemia: S/p IVIG dosing with premedication 7/30. IgG on 8/10 low but stable at 590.  - Repeat IgG  On 8/29 for a one month check     8. A flutter with RVR/SVT: SVT first noted on 7/14, prior to HD line placement, continues  intermittently.  Aflutter with RVR to 200 7/17 triggered by activity.  EP consulted 7/17 given persistent tachycardia/dysrhythmia, midodrine discontinued. AC deferred per EP given bleeding risk and despite CHADSVASc of 2. Zio patch fell off, will be replaced.  - Continue metoprolol  - Zio patch ordered with EP follow up     9. H/o HTN:  H/o HFpEF:  Had vasoplegia post operatively. Last echo 7/7/22 normal EF with good LV function. S/p hydrocortisone 7/6-7/9 and fludrocortisone 7/6-7/11 without significant improvement.   - Continue metoprolol 50 mg BID, may need to increase given her BPs    10. Left hand ischemia: radial artery thrombosis identified on duplex doppler s/p thrombectomy on 7/2. Repeat LUE arterial US 8/15/22 with dopplerable flow. No longer on AC.      11. BACILIO:   Hyperkalemia: multifactorial including medications (Bactrim, tacrolimus) and hypotension. iHD 7/14-7/16. Potassium intermittently elevated since 8/8. Transitioned to low potassium TF formula.  - Tacrolimus monitoring as above  - Continue Florinef     12. Elevated LFTs:   Extrahepatic and intrahepatic biliary dilation: ccute rise in LFTs on 8/8 associated with increased and different abdominal pain as below. Abdominal US 8/8 with extrahepatic mass at level of common bile duct with associated intrahepatic and common bile duct dilation, patent hepatic vasculature, and layering gallbladder sludge. MRCP 8/9 with slight increase in moderate biliary dilation and gall bladder with moderate protein/hemorrhagic material. S/p ERCP 8/11 with findings concerning for hemobilia, stent placed across duodenum and in CBD. CTA abdomen 8/11 without evidence of acute GI bleed. GI team concerned bleeding originating from gallbladder. LFTs today WNL.   - Given acute decrease in hgb, may need to move up EGD scheduled on 10/13 or consider CT abdomen if not improving (currently set for 6 months for pancreatic findings ~2/2023)     13. IPMN: MRCP 8/9 showed cystic foci in  the pancreatic head measuring 4 x 3 mm superiorly and 4 x 3 mm inferiorly.   - Follow up imaging in 6 months to 1 year    14. Severe gastroparesis:   Pyloric ulcer: CT abdomen 7/15 with moderate to large gastric distention, otherwise without obstruction. Gastric emptying study 7/20 with severe gastric emptying delay (95% retention at 4 hours), s/p GJ tube placement in IR 7/27. S/p EGD 8/3 for coffee ground G tube output, noted to have pyloric ulcer and excessive gastric fluid and residual food. Hemoglobin drop with dark tarry stools 8/8.  S/p repeat EGD 8/11 with mild erythema 2/2 GJ tube trauma seen near insertion site but no active bleeding. Discussed diet today and recommend she go back to full liquid for comfort only for now.   - PPI BID  - Simethicone prn  - TF are continuous and ALL medications via J tube  - G tube to gravity drainage; full liquid diet for comfort only  - Repeat EGD 10/13 to check healing of ulcer, sooner if hgb declines further     15. Post op pain management: feels she still needs oxycodone, discussed weaning off over the next two weeks. Not using Tylenol.  - Recommend Tylenol 1000 mg BID  - Gave last prescription for oxycodone 5 mg every 8 hours PRN     16. Acute on chronic anemia: last PRBC on 8/9/22. Thought secondary to chronic illness and BM suppression from meds. MCV elevated due to MMF. Today, hgb down to 7.9 from 9/1 on 8/16. Ferritin elevated, iron and sat index WNL. B12 WNL. Retic % slightly elevated at 2.6, absolute RC WNL  - Stop dapsone today  - Monitor, if declines further, may need CT abdomen and/or move up EGD     17. Stress-induced/steroid induced hyperglycemia with intermittent hypoglycemia: most BS have been < 200. Unclear why she is doing every 4 hour BS checks at home as this is not sustainable overnight with her caretaker.  - Will have Peter follow up  - Continue Lantus and sliding scale insulin    We discussed the risks and benefits of receiving EVUSHELD, as well as  "alternative treatments. The Emergency Use Administration (EUA) was provided to the patient for review and an opportunity for questions was provided. Patient wishes to proceed with LUCINDA.    I have spent >60 minutes on the day of the visit reviewing the patient's chart, vitals, labs, imaging, PFTs and answering all patient questions excluding PFTs.     RTC: next week as scheduled  Influenza and other vaccinations: received 4 doses of covid vaccine pre transplant; Lucinda today  Annual dermatology visit:    Kaylin Triana PA-C  Pulmonary, Allergy, Critical Care and Sleep Medicine        Interval History:     Since her last visit, patient is doing okay. On 1 L O2 most of the time except when she is walking, but does drop down into the upper 80s. Needs to do PT/OT at home before insurance will cover pulmonary rehab. No new shortness of breath, does have a cough at night per her daughter, intermittently productive and more with her breathing exercises. No congestion or tightness. Notes she is still using oxycodone twice per day, requesting more. No nausea, had one episode of a \"zap\" around her feeding tube, but that has resolved. No vomiting, stools are formed as of yesterday. Doing continuous tube feeds, flushes are           Review of Systems:   Please see HPI, otherwise the complete 10 point ROS is negative.           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     Hepatitis 2017    Hep C, [facility]     HTN (hypertension)      Osteopenia      Past Surgical History:   Procedure Laterality Date     BRONCHOSCOPY (RIGID OR FLEXIBLE), DIAGNOSTIC N/A 8/2/2022    Procedure: BRONCHOSCOPY, DIAGNOSTIC- inspection Bronch;  Surgeon: Kamala Lovell MD;  Location:  GI     BRONCHOSCOPY FLEXIBLE AND RIGID N/A " 07/19/2022    Procedure: BRONCHOSCOPY inspection only;  Surgeon: Bob Liao MD;  Location:  GI     COLONOSCOPY  2015     CV CORONARY ANGIOGRAM N/A 06/30/2021    Procedure: CV CORONARY ANGIOGRAM;  Surgeon: Alexander Cuellar MD;  Location:  HEART CARDIAC CATH LAB     CV RIGHT HEART CATH MEASUREMENTS RECORDED N/A 06/30/2021    Procedure: CV RIGHT HEART CATH;  Surgeon: Alexander Cuellar MD;  Location:  HEART CARDIAC CATH LAB     ENDOSCOPIC RETROGRADE CHOLANGIOPANCREATOGRAM N/A 8/11/2022    Procedure: ENDOSCOPIC RETROGRADE CHOLANGIOPANCREATOGRAPHY WITH PANCREATIC DUCT NEEDLE KNIFE AND STENT PLACEMENT, BILE DUCT SPHINCTEROTOMY, BLOOD/DEBRIS REMOVAL AND STENT PLACEMENT;  Surgeon: Cosmo Arroyo MD;  Location:  OR     ENT SURGERY  1974    tonsillectomy     ENTEROSCOPY SMALL BOWEL N/A 8/11/2022    Procedure: SMALL BOWEL ENTEROSCOPY;  Surgeon: Cosmo Arroyo MD;  Location:  OR     ESOPHAGOGASTRODUODENOSCOPY, WITH NASOGASTRIC TUBE INSERTION N/A 07/01/2022    Procedure: ESOPHAGOGASTRODUODENOSCOPY, WITH NASOJEJUNAL TUBE INSERTION;  Surgeon: Ozzy Nickerson MD;  Location:  GI     ESOPHAGOSCOPY, GASTROSCOPY, DUODENOSCOPY (EGD), COMBINED N/A 8/3/2022    Procedure: ESOPHAGOGASTRODUODENOSCOPY (EGD);  Surgeon: Ira Andres MD;  Location:  GI     HAND SURGERY       LEEP TX, CERVICAL  04/07/2017    HECTOR III     LYMPH NODE BIOPSY Left 2005    Left axilla, benign- [facility]     MIDLINE INSERTION - DOUBLE LUMEN Left 07/28/2022    20cm, Basilic vein     THROMBECTOMY UPPER EXTREMITY Left 07/02/2022    Procedure: LEFT RADIAL ARM THROMBECTOMY;  Surgeon: Christie Graham MD;  Location:  OR     TRANSPLANT LUNG RECIPIENT SINGLE X2 Bilateral 06/28/2022    Procedure: Clamshell Incision, Bilateral Sequential Lung Transplant, On Cardiopulmonary Bypass, Flexible Bronchoscopy;  Surgeon: Sue Sunshine MD;  Location:  OR           Family History:     No family history on  file.         Social History:     Social History     Socioeconomic History     Marital status: Single     Spouse name: Not on file     Number of children: Not on file     Years of education: Not on file     Highest education level: Not on file   Occupational History     Not on file   Tobacco Use     Smoking status: Former Smoker     Years: 30.00     Types: Cigarettes     Quit date: 2020     Years since quittin.7     Smokeless tobacco: Never Used   Substance and Sexual Activity     Alcohol use: Not Currently     Drug use: Not Currently     Types: Marijuana, Methamphetamines     Comment: hx:marijuana and methamphetamine-quit both unsure ?  2-3 yrs ago     Sexual activity: Not on file   Other Topics Concern     Parent/sibling w/ CABG, MI or angioplasty before 65F 55M? Not Asked   Social History Narrative     Not on file     Social Determinants of Health     Financial Resource Strain: Not on file   Food Insecurity: Not on file   Transportation Needs: Not on file   Physical Activity: Not on file   Stress: Not on file   Social Connections: Not on file   Intimate Partner Violence: Not on file   Housing Stability: Not on file            Medications:     Current Outpatient Medications   Medication     acetaminophen (TYLENOL) 160 MG/5ML liquid     oxyCODONE (ROXICODONE) 5 MG tablet     alcohol swab prep pads     aspirin (ASA) 81 MG EC tablet     blood glucose (CONTOUR NEXT TEST) test strip     blood glucose (NO BRAND SPECIFIED) lancets standard     blood glucose monitoring (CONTOUR NEXT MONITOR W/DEVICE KIT) meter device kit     calcium carbonate 600 mg-vitamin D 400 units (CALTRATE) 600-400 MG-UNIT per tablet     CELLCEPT (BRAND) 200 MG/ML suspension     fludrocortisone (FLORINEF) 0.1 MG tablet     insulin aspart (NOVOLOG PEN) 100 UNIT/ML pen     insulin glargine (LANTUS PEN) 100 UNIT/ML pen     insulin pen needle (31G X 5 MM) 31G X 5 MM miscellaneous     metoprolol tartrate (LOPRESSOR) 50 MG tablet     Multiple  "Vitamins-Minerals (MULTIVITAMINS W/MINERALS) liquid     mycophenolate (GENERIC EQUIVALENT) 200 MG/ML suspension     Nutritional Supplements (GLUCOSE MANAGEMENT) TABS     nystatin (MYCOSTATIN) 687406 UNIT/ML suspension     ondansetron (ZOFRAN ODT) 4 MG ODT tab     pantoprazole (PROTONIX) 2 mg/mL SUSP suspension     predniSONE (DELTASONE) 5 MG tablet     protein modular (PROSOURCE TF) LIQD     Sharps Container MISC     simethicone (MYLICON) 40 MG/0.6ML suspension     tacrolimus (GENERIC EQUIVALENT) 1 mg/mL suspension     valGANciclovir (VALCYTE) 50 MG/ML solution     No current facility-administered medications for this visit.            Physical Exam:   /77 (BP Location: Right arm, Patient Position: Chair, Cuff Size: Adult Regular)   Pulse 94   Ht 1.6 m (5' 3\")   Wt 70.3 kg (155 lb)   SpO2 95%   BMI 27.46 kg/m      GENERAL: alert, NAD  HEENT: NCAT, EOMI, no scleral icterus, oral mucosa moist and without lesions  Neck: no cervical or supraclavicular adenopathy  Lungs: moderate airflow, decreased in bases L>R  CV: RRR, S1S2, no murmurs noted  Abdomen: normoactive BS, soft, non tender  Lymph: no edema  Neuro: AAO X 3, CN 2-12 grossly intact  Psychiatric: normal affect, good eye contact  Skin: no rash, jaundice or lesions on limited exam         Data:   All laboratory and imaging data reviewed.      Recent Results (from the past 168 hour(s))   Magnesium    Collection Time: 08/18/22 10:33 AM   Result Value Ref Range    Magnesium 2.6 (H) 1.6 - 2.3 mg/dL   CBC with platelets    Collection Time: 08/18/22 10:33 AM   Result Value Ref Range    WBC Count 8.7 4.0 - 11.0 10e3/uL    RBC Count 2.79 (L) 3.80 - 5.20 10e6/uL    Hemoglobin 8.6 (L) 11.7 - 15.7 g/dL    Hematocrit 28.3 (L) 35.0 - 47.0 %     (H) 78 - 100 fL    MCH 30.8 26.5 - 33.0 pg    MCHC 30.4 (L) 31.5 - 36.5 g/dL    RDW 16.7 (H) 10.0 - 15.0 %    Platelet Count 387 150 - 450 10e3/uL   CMV Quantitative, PCR    Collection Time: 08/18/22 10:33 AM    " Specimen: Arm, Right; Blood   Result Value Ref Range    CMV DNA IU/mL Not Detected Not Detected IU/mL   Tacrolimus by Tandem Mass Spectrometry    Collection Time: 08/18/22 10:33 AM   Result Value Ref Range    Tacrolimus by Tandem Mass Spectrometry 7.6 5.0 - 15.0 ug/L    Tacrolimus Last Dose Date      Tacrolimus Last Dose Time     Comprehensive metabolic panel    Collection Time: 08/18/22 10:33 AM   Result Value Ref Range    Sodium 143 133 - 144 mmol/L    Potassium 4.7 3.4 - 5.3 mmol/L    Chloride 97 94 - 109 mmol/L    Carbon Dioxide (CO2) 44 (H) 20 - 32 mmol/L    Anion Gap 2 (L) 3 - 14 mmol/L    Urea Nitrogen 74 (H) 7 - 30 mg/dL    Creatinine 1.56 (H) 0.52 - 1.04 mg/dL    Calcium 9.0 8.5 - 10.1 mg/dL    Glucose 111 (H) 70 - 99 mg/dL    Alkaline Phosphatase 193 (H) 40 - 150 U/L    AST 16 0 - 45 U/L    ALT 41 0 - 50 U/L    Protein Total 5.9 (L) 6.8 - 8.8 g/dL    Albumin 2.3 (L) 3.4 - 5.0 g/dL    Bilirubin Total 0.3 0.2 - 1.3 mg/dL    GFR Estimate 38 (L) >60 mL/min/1.73m2   General PFT Lab (Please always keep checked)    Collection Time: 08/18/22 10:41 AM   Result Value Ref Range    FVC-Pred 3.06 L    FVC-Pre 1.33 L    FVC-%Pred-Pre 43 %    FEV1-Pre 1.22 L    FEV1-%Pred-Pre 50 %    FEV1FVC-Pred 79 %    FEV1FVC-Pre 92 %    FEFMax-Pred 6.14 L/sec    FEFMax-Pre 3.79 L/sec    FEFMax-%Pred-Pre 61 %    FEF2575-Pred 2.22 L/sec    FEF2575-Pre 2.04 L/sec    HFA1032-%Pred-Pre 91 %    ExpTime-Pre 4.94 sec    FIFMax-Pre 2.55 L/sec    FEV1FEV6-Pred 81 %    FEV1FEV6-Pre 95 %   Glucose by meter    Collection Time: 08/19/22  4:05 PM   Result Value Ref Range    GLUCOSE BY METER POCT 87 70 - 99 mg/dL   Comprehensive metabolic panel    Collection Time: 08/19/22  4:07 PM   Result Value Ref Range    Sodium 146 (H) 136 - 145 mmol/L    Potassium 3.7 3.4 - 5.3 mmol/L    Creatinine 1.45 (H) 0.51 - 0.95 mg/dL    Urea Nitrogen 61.7 (H) 8.0 - 23.0 mg/dL    Chloride 96 (L) 98 - 107 mmol/L    Carbon Dioxide (CO2) 43 (H) 22 - 29 mmol/L    Anion Gap  7 7 - 15 mmol/L    Glucose 84 70 - 99 mg/dL    Calcium 9.5 8.8 - 10.2 mg/dL    Protein Total 6.3 (L) 6.4 - 8.3 g/dL    Albumin 3.5 3.5 - 5.2 g/dL    Bilirubin Total 0.3 <=1.2 mg/dL    Alkaline Phosphatase 175 (H) 35 - 104 U/L    AST 24 10 - 35 U/L    ALT 33 10 - 35 U/L    GFR Estimate 41 (L) >60 mL/min/1.73m2   CBC with platelets and differential    Collection Time: 08/19/22  4:07 PM   Result Value Ref Range    WBC Count 7.8 4.0 - 11.0 10e3/uL    RBC Count 2.97 (L) 3.80 - 5.20 10e6/uL    Hemoglobin 8.9 (L) 11.7 - 15.7 g/dL    Hematocrit 29.9 (L) 35.0 - 47.0 %     (H) 78 - 100 fL    MCH 30.0 26.5 - 33.0 pg    MCHC 29.8 (L) 31.5 - 36.5 g/dL    RDW 16.9 (H) 10.0 - 15.0 %    Platelet Count 413 150 - 450 10e3/uL    % Neutrophils 79 %    % Lymphocytes 4 %    % Monocytes 10 %    % Eosinophils 2 %    % Basophils 1 %    % Immature Granulocytes 4 %    NRBCs per 100 WBC 0 <1 /100    Absolute Neutrophils 6.3 1.6 - 8.3 10e3/uL    Absolute Lymphocytes 0.3 (L) 0.8 - 5.3 10e3/uL    Absolute Monocytes 0.8 0.0 - 1.3 10e3/uL    Absolute Eosinophils 0.2 0.0 - 0.7 10e3/uL    Absolute Basophils 0.0 0.0 - 0.2 10e3/uL    Absolute Immature Granulocytes 0.3 <=0.4 10e3/uL    Absolute NRBCs 0.0 10e3/uL   Extra Blue Top Tube    Collection Time: 08/19/22  4:09 PM   Result Value Ref Range    Hold Specimen JIC    Extra Red Top Tube    Collection Time: 08/19/22  4:09 PM   Result Value Ref Range    Hold Specimen JIC    Glucose by meter    Collection Time: 08/19/22  5:09 PM   Result Value Ref Range    GLUCOSE BY METER POCT 170 (H) 70 - 99 mg/dL   CBC with platelets    Collection Time: 08/24/22  8:10 AM   Result Value Ref Range    WBC Count 7.8 4.0 - 11.0 10e3/uL    RBC Count 2.54 (L) 3.80 - 5.20 10e6/uL    Hemoglobin 7.6 (L) 11.7 - 15.7 g/dL    Hematocrit 25.6 (L) 35.0 - 47.0 %     (H) 78 - 100 fL    MCH 29.9 26.5 - 33.0 pg    MCHC 29.7 (L) 31.5 - 36.5 g/dL    RDW 16.4 (H) 10.0 - 15.0 %    Platelet Count 294 150 - 450 10e3/uL    Magnesium    Collection Time: 08/24/22  8:10 AM   Result Value Ref Range    Magnesium 2.6 (H) 1.6 - 2.3 mg/dL   Basic metabolic panel    Collection Time: 08/24/22  8:10 AM   Result Value Ref Range    Sodium 143 133 - 144 mmol/L    Potassium 4.1 3.4 - 5.3 mmol/L    Chloride 96 94 - 109 mmol/L    Carbon Dioxide (CO2) 42 (H) 20 - 32 mmol/L    Anion Gap 5 3 - 14 mmol/L    Urea Nitrogen 56 (H) 7 - 30 mg/dL    Creatinine 1.27 (H) 0.52 - 1.04 mg/dL    Calcium 9.0 8.5 - 10.1 mg/dL    Glucose 126 (H) 70 - 99 mg/dL    GFR Estimate 48 (L) >60 mL/min/1.73m2   Vitamin B12    Collection Time: 08/24/22  8:10 AM   Result Value Ref Range    Vitamin B12 536 193 - 986 pg/mL   Hepatic function panel    Collection Time: 08/24/22  8:10 AM   Result Value Ref Range    Bilirubin Total 0.2 0.2 - 1.3 mg/dL    Bilirubin Direct 0.1 0.0 - 0.2 mg/dL    Protein Total 6.0 (L) 6.8 - 8.8 g/dL    Albumin 2.2 (L) 3.4 - 5.0 g/dL    Alkaline Phosphatase 134 40 - 150 U/L    AST 16 0 - 45 U/L    ALT 22 0 - 50 U/L   Ferritin    Collection Time: 08/24/22  8:10 AM   Result Value Ref Range    Ferritin 334 (H) 8 - 252 ng/mL   Iron and iron binding capacity    Collection Time: 08/24/22  8:10 AM   Result Value Ref Range    Iron 41 35 - 180 ug/dL    Iron Binding Capacity 196 (L) 240 - 430 ug/dL    Iron Sat Index 21 15 - 46 %   Reticulocyte count    Collection Time: 08/24/22  8:10 AM   Result Value Ref Range    % Reticulocyte 2.6 (H) 0.5 - 2.0 %    Absolute Reticulocyte 0.065 0.025 - 0.095 10e6/uL   General PFT Lab (Please always keep checked)    Collection Time: 08/24/22  8:23 AM   Result Value Ref Range    FVC-Pred 3.06 L    FVC-Pre 1.23 L    FVC-%Pred-Pre 40 %    FEV1-Pre 1.17 L    FEV1-%Pred-Pre 48 %    FEV1FVC-Pred 79 %    FEV1FVC-Pre 95 %    FEFMax-Pred 6.14 L/sec    FEFMax-Pre 4.25 L/sec    FEFMax-%Pred-Pre 69 %    FEF2575-Pred 2.22 L/sec    FEF2575-Pre 2.78 L/sec    NQE0026-%Pred-Pre 124 %    ExpTime-Pre 4.81 sec    FIFMax-Pre 3.09 L/sec    FEV1FEV6-Pred  81 %    FEV1FEV6-Pre 95 %     PFT interpretation:  Maneuver: valid, but did not meet ATS guidelines      Again, thank you for allowing me to participate in the care of your patient.        Sincerely,        Kaylin Triana PA-C

## 2022-08-24 NOTE — LETTER
PHYSICIAN ORDERS      DATE & TIME ISSUED: 2022 11:47 AM  PATIENT NAME: Sofie Rodriguez   : 1960     [facility] MR# [if applicable]: [id number]     DIAGNOSIS:  Lung Transplant  Z94.2  Please cancel home care RN services for patient.       Any questions please call: [place] [phone]    Please fax these results to [phone].        Kaylin Triana PA-C

## 2022-08-24 NOTE — NURSING NOTE
Chief Complaint   Patient presents with     Lung Transplant     Lung TXP follow up      Vitals were taken and medications were reconciled.     Jeanne Ordonez RMA  9:08 AM

## 2022-08-24 NOTE — TELEPHONE ENCOUNTER
Clinic follow up:   -Repeat labs scheduled for 8/29 at 10:15  -Confirmed patient is not taking Magnesium supplement  -Faxed orders to [facility] to cancel PT/OT/RN services. Per Kaylin Triana, pul rehab takes priority. Pt getting along well with ADLs at home, walks daily with daughter. Is unable to due stairs at this time however. Talked with pul rehab [place] about this- feels it would be helpful to increase her muscle strength in her legs, they will work on this with her. Initial pul rehab eval set up for 8/30 at 3:00 PM.  -Most recent up to date medication list emailed to Charity  -Pt c/o L leg swelling, >R. No numbness/tingling, can move all toes. Plan for venous ultrasound today  -Message sent to [facility] asking TF extension pieces (piece that connects TF tubing to g/j tube and has open port that allows for med administration) be sent to patient, pt totally out  -Educated patient on shuttle that is available to assist in rides to hospital/[facility]. Pt sent time/scheduled of shuttle and phone number to contact them.

## 2022-08-25 ENCOUNTER — TELEPHONE (OUTPATIENT)
Dept: TRANSPLANT | Facility: CLINIC | Age: 62
End: 2022-08-25

## 2022-08-25 ENCOUNTER — ANCILLARY PROCEDURE (OUTPATIENT)
Dept: ULTRASOUND IMAGING | Facility: CLINIC | Age: 62
End: 2022-08-25
Attending: PHYSICIAN ASSISTANT
Payer: MEDICARE

## 2022-08-25 DIAGNOSIS — M79.89 SWELLING OF LEFT LOWER EXTREMITY: ICD-10-CM

## 2022-08-25 DIAGNOSIS — Z94.2 LUNG REPLACED BY TRANSPLANT (H): ICD-10-CM

## 2022-08-25 LAB
EBV DNA # SPEC NAA+PROBE: <500 COPIES/ML
EBV DNA SPEC NAA+PROBE-LOG#: <2.7 {LOG_COPIES}/ML

## 2022-08-25 PROCEDURE — 93970 EXTREMITY STUDY: CPT | Performed by: RADIOLOGY

## 2022-08-25 NOTE — PROGRESS NOTES
Cross Cover Note    July 29, 2022  7:04 PM    Discussed pain control w/ RN which has been uncontrolled based on scored 7/10 consistently for several days w/ her oxycodone 5mg PRN regimen.Will go slow and trial 10mg oxycodone (19:00 ordered x1 dose) and reassess in 2 hours given pt appears to be chronic retainer and could tip into respiratory failure easily with excess CNS sedation brought on by also adding robaxin and other IV narcotics simultaneously.    9:46 PM   10mg worked well for pain without evidence of respiratory failure. Changed oxycodone PRN order from 2.5-5 to 5-10mg PRN.    Trenton Roblero MD  [facility]       Cosentyx Pregnancy And Lactation Text: This medication is Pregnancy Category B and is considered safe during pregnancy. It is unknown if this medication is excreted in breast milk.

## 2022-08-26 ENCOUNTER — TELEPHONE (OUTPATIENT)
Dept: TRANSPLANT | Facility: CLINIC | Age: 62
End: 2022-08-26

## 2022-08-26 DIAGNOSIS — Z94.2 LUNG REPLACED BY TRANSPLANT (H): Primary | ICD-10-CM

## 2022-08-26 LAB
BACTERIA SPT CULT: NORMAL
GRAM STAIN RESULT: NORMAL

## 2022-08-26 PROCEDURE — 87205 SMEAR GRAM STAIN: CPT | Performed by: PHYSICIAN ASSISTANT

## 2022-08-26 NOTE — TELEPHONE ENCOUNTER
"Pt's care giver Charity called to relay that the \"little button\" that lays by the feeding tube to keep it up and in place, came off with the dressing. No problems with the tube, still in place, no problems using it.     Instructed to place back where it was and redress. Touch base with TC tomorrow.     Pt and care giver agreed with plan.    "

## 2022-08-26 NOTE — TELEPHONE ENCOUNTER
"Pt called concerned about peg tube \"button\" that fell off. Told her this is normal for these to loosen/fall off post peg placement. Still has one additional button in place.     Instructed patient that this is normal, and they are expected to fall off. Told her if the other button still attached happens to be on still at her next clinic visit, we can remove that one.     Needs a repeat sputum sample, one collected 8/26 inadequate for testing. Pt aware, she will collect sputum cup when she is getting labs done Monday      "

## 2022-08-27 ENCOUNTER — HOME INFUSION (PRE-WILLOW HOME INFUSION) (OUTPATIENT)
Dept: PHARMACY | Facility: CLINIC | Age: 62
End: 2022-08-27

## 2022-08-29 ENCOUNTER — HOSPITAL ENCOUNTER (OUTPATIENT)
Facility: AMBULATORY SURGERY CENTER | Age: 62
Discharge: HOME | End: 2022-08-29
Attending: PHYSICIAN ASSISTANT | Admitting: PHYSICIAN ASSISTANT
Payer: MEDICARE

## 2022-08-29 ENCOUNTER — TELEPHONE (OUTPATIENT)
Dept: TRANSPLANT | Facility: CLINIC | Age: 62
End: 2022-08-29

## 2022-08-29 ENCOUNTER — LAB (OUTPATIENT)
Dept: LAB | Facility: CLINIC | Age: 62
End: 2022-08-29
Payer: MEDICARE

## 2022-08-29 VITALS
BODY MASS INDEX: 27.46 KG/M2 | DIASTOLIC BLOOD PRESSURE: 66 MMHG | SYSTOLIC BLOOD PRESSURE: 126 MMHG | HEIGHT: 63 IN | RESPIRATION RATE: 16 BRPM | TEMPERATURE: 98 F | HEART RATE: 91 BPM | OXYGEN SATURATION: 99 % | WEIGHT: 155 LBS

## 2022-08-29 DIAGNOSIS — Z94.2 S/P LUNG TRANSPLANT (H): Chronic | ICD-10-CM

## 2022-08-29 DIAGNOSIS — R52 GENERALIZED PAIN: ICD-10-CM

## 2022-08-29 DIAGNOSIS — D63.8 ANEMIA IN OTHER CHRONIC DISEASES CLASSIFIED ELSEWHERE: ICD-10-CM

## 2022-08-29 DIAGNOSIS — Z00.6 RESEARCH STUDY PATIENT: Primary | ICD-10-CM

## 2022-08-29 DIAGNOSIS — D64.9 ANEMIA, UNSPECIFIED TYPE: ICD-10-CM

## 2022-08-29 DIAGNOSIS — Z94.2 LUNG REPLACED BY TRANSPLANT (H): ICD-10-CM

## 2022-08-29 DIAGNOSIS — D64.9 ANEMIA: Primary | ICD-10-CM

## 2022-08-29 LAB
ANION GAP SERPL CALCULATED.3IONS-SCNC: <1 MMOL/L (ref 3–14)
APPEARANCE FLD: ABNORMAL
BASE EXCESS BLDV CALC-SCNC: 19.1 MMOL/L (ref -7.7–1.9)
BUN SERPL-MCNC: 54 MG/DL (ref 7–30)
CALCIUM SERPL-MCNC: 8.7 MG/DL (ref 8.5–10.1)
CELL COUNT BODY FLUID SOURCE: ABNORMAL
CHLORIDE BLD-SCNC: 97 MMOL/L (ref 94–109)
CO2 SERPL-SCNC: 44 MMOL/L (ref 20–32)
COLOR FLD: ABNORMAL
CREAT SERPL-MCNC: 1.24 MG/DL (ref 0.52–1.04)
ERYTHROCYTE [DISTWIDTH] IN BLOOD BY AUTOMATED COUNT: 17.3 % (ref 10–15)
GFR SERPL CREATININE-BSD FRML MDRD: 50 ML/MIN/1.73M2
GLUCOSE BLD-MCNC: 104 MG/DL (ref 70–99)
GLUCOSE BODY FLUID SOURCE: NORMAL
GLUCOSE FLD-MCNC: 103 MG/DL
HCO3 BLDV-SCNC: 46 MMOL/L (ref 21–28)
HCT VFR BLD AUTO: 23.7 % (ref 35–47)
HGB BLD-MCNC: 7.1 G/DL (ref 11.7–15.7)
LYMPHOCYTES NFR FLD MANUAL: 92 %
MAGNESIUM SERPL-MCNC: 2.8 MG/DL (ref 1.6–2.3)
MCH RBC QN AUTO: 30.2 PG (ref 26.5–33)
MCHC RBC AUTO-ENTMCNC: 30 G/DL (ref 31.5–36.5)
MCV RBC AUTO: 101 FL (ref 78–100)
MONOS+MACROS NFR FLD MANUAL: 6 %
NEUTS BAND NFR FLD MANUAL: 3 %
O2/TOTAL GAS SETTING VFR VENT: 21 %
PCO2 BLDV: 71 MM HG (ref 40–50)
PH BLDV: 7.42 [PH] (ref 7.32–7.43)
PH BODY FLUID SOURCE: NORMAL
PH FLD: 7.7 PH
PLATELET # BLD AUTO: 283 10E3/UL (ref 150–450)
PO2 BLDV: 24 MM HG (ref 25–47)
POTASSIUM BLD-SCNC: 3.6 MMOL/L (ref 3.4–5.3)
PROT FLD-MCNC: 1.6 G/DL
PROTEIN BODY FLUID SOURCE: NORMAL
RBC # BLD AUTO: 2.35 10E6/UL (ref 3.8–5.2)
SODIUM SERPL-SCNC: 141 MMOL/L (ref 133–144)
TACROLIMUS BLD-MCNC: 14.5 UG/L (ref 5–15)
TME LAST DOSE: NORMAL H
TME LAST DOSE: NORMAL H
WBC # BLD AUTO: 8.8 10E3/UL (ref 4–11)
WBC # FLD AUTO: 203 /UL

## 2022-08-29 PROCEDURE — 32555 ASPIRATE PLEURA W/ IMAGING: CPT | Mod: RT | Performed by: PHYSICIAN ASSISTANT

## 2022-08-29 PROCEDURE — 87205 SMEAR GRAM STAIN: CPT | Performed by: INTERNAL MEDICINE

## 2022-08-29 PROCEDURE — 82784 ASSAY IGA/IGD/IGG/IGM EACH: CPT | Performed by: PHYSICIAN ASSISTANT

## 2022-08-29 PROCEDURE — 83615 LACTATE (LD) (LDH) ENZYME: CPT | Performed by: INTERNAL MEDICINE

## 2022-08-29 PROCEDURE — 80048 BASIC METABOLIC PNL TOTAL CA: CPT | Performed by: PATHOLOGY

## 2022-08-29 PROCEDURE — 87070 CULTURE OTHR SPECIMN AEROBIC: CPT | Performed by: INTERNAL MEDICINE

## 2022-08-29 PROCEDURE — 84157 ASSAY OF PROTEIN OTHER: CPT | Performed by: INTERNAL MEDICINE

## 2022-08-29 PROCEDURE — 80197 ASSAY OF TACROLIMUS: CPT | Performed by: PHYSICIAN ASSISTANT

## 2022-08-29 PROCEDURE — 82803 BLOOD GASES ANY COMBINATION: CPT | Performed by: PATHOLOGY

## 2022-08-29 PROCEDURE — 85027 COMPLETE CBC AUTOMATED: CPT | Performed by: PATHOLOGY

## 2022-08-29 PROCEDURE — 83986 ASSAY PH BODY FLUID NOS: CPT | Performed by: INTERNAL MEDICINE

## 2022-08-29 PROCEDURE — 36415 COLL VENOUS BLD VENIPUNCTURE: CPT | Performed by: PATHOLOGY

## 2022-08-29 PROCEDURE — 82945 GLUCOSE OTHER FLUID: CPT | Performed by: INTERNAL MEDICINE

## 2022-08-29 PROCEDURE — 89051 BODY FLUID CELL COUNT: CPT | Performed by: INTERNAL MEDICINE

## 2022-08-29 PROCEDURE — 83735 ASSAY OF MAGNESIUM: CPT | Performed by: PATHOLOGY

## 2022-08-29 RX ORDER — HEPARIN SODIUM,PORCINE 10 UNIT/ML
5 VIAL (ML) INTRAVENOUS
Status: CANCELLED | OUTPATIENT
Start: 2022-09-01

## 2022-08-29 RX ORDER — ACETAMINOPHEN 160 MG/5ML
1000 LIQUID ORAL 2 TIMES DAILY
Qty: 1200 ML | Refills: 3 | Status: ON HOLD | OUTPATIENT
Start: 2022-08-29 | End: 2022-10-08

## 2022-08-29 RX ORDER — LIDOCAINE 40 MG/G
CREAM TOPICAL
Status: DISCONTINUED | OUTPATIENT
Start: 2022-08-29 | End: 2022-08-30 | Stop reason: HOSPADM

## 2022-08-29 RX ORDER — LIDOCAINE HYDROCHLORIDE 10 MG/ML
INJECTION, SOLUTION EPIDURAL; INFILTRATION; INTRACAUDAL; PERINEURAL PRN
Status: DISCONTINUED | OUTPATIENT
Start: 2022-08-29 | End: 2022-08-29 | Stop reason: HOSPADM

## 2022-08-29 RX ORDER — GUAIFENESIN 600 MG/1
15 TABLET, EXTENDED RELEASE ORAL DAILY
Qty: 450 ML | Refills: 3 | Status: SHIPPED | OUTPATIENT
Start: 2022-08-29 | End: 2022-09-02

## 2022-08-29 RX ORDER — HEPARIN SODIUM (PORCINE) LOCK FLUSH IV SOLN 100 UNIT/ML 100 UNIT/ML
5 SOLUTION INTRAVENOUS
Status: CANCELLED | OUTPATIENT
Start: 2022-09-01

## 2022-08-29 NOTE — DISCHARGE INSTRUCTIONS
Discharge Instructions for Paracentesis or Thoracentesis     Paracentesis is a procedure to remove extra fluid from your belly (abdomen). Thoracentesis is a procedure to remove extra fluid from your chest.  This fluid buildup is called ascites. The procedure may have been done to take a sample of the fluid. Or, it may have been done to drain the extra fluid from your abdomen or chest to help make you more comfortable.     Home care    If you have pain after the procedure, your healthcare provider can prescribe or recommend pain medicines. Take these exactly as directed. If you stopped taking other medicines before the procedure, ask your provider when you can start them again.    Avoid strenuous activity for 48 hours.    You will have a small bandage over the puncture site. Adhesive tapes, adhesive strips, or surgical glue may also be used to close the incision. They also help stop bleeding and promote healing. You may take the bandage off in 24-48 hours. Once there is a scab over the site, no bandages are required.    Check the puncture site for the signs of infection listed below.     Follow-up care  Make a follow-up appointment with your healthcare provider as directed. During your follow-up visit, your provider will check your healing. Let your provider know how you are feeling. You can also discuss the cause of your fluid, and if you need any further treatment.    When to seek medical advice:  Call your healthcare provider if you have any of the following after the procedure:    A fever of 100.4 F (38.0 C) or higher    Trouble breathing    Abdominal pain that is worse than expected    Belly abdominal pain not caused by having the skin punctured that is worse than expected    Persistent bleeding from the puncture site    More than a small amount of fluid leaking from the puncture site    Swollen belly    Signs of infection at the puncture site. These include increased pain, redness, or swelling, warmth, or  bad-smelling drainage.    Feeling dizzy or lightheaded, or fainting     Call our Interventional Radiology (IR) service if:  If you start bleeding from the procedure site.  If you do start to bleed from the site, lie down and hold some pressure on the site.  Our radiology provider can help you decide if you need to return to the hospital.  If you have new or worsening pain related to the procedure.  If you have pronounced swelling at the procedure site.  If you develop persistent nausea or vomiting.  If you develop hives or a rash or any unexplained itching.  If you have a fever of greater than 100.4  F and chills in the first 5 days after procedure.  Any other concerns related to your procedure.      [facility]  Interventional Radiology (IR)  [address]  [facility] [address]    Contact Number:  [phone]  (IR control desk)  Monday - Friday 8:00 am - 4:30 pm    After hours for urgent concerns:  [phone]  After 4:30 pm Monday - Friday, Weekends and Holidays.   Ask for Interventional Radiology on-call.  Someone is available 24 hours a day.  [facility] toll free number:  [phone]

## 2022-08-29 NOTE — PROGRESS NOTES
Per patient request, new Rx sent for liquid tylenol [place] [facility]

## 2022-08-29 NOTE — PROCEDURES
Interventional Radiology Brief Post Procedure Note    Procedure: IR Thoracentesis    Proceduralist: Bo Capone PA-C    Assistant: None    Time Out: Prior to the start of the procedure and with procedural staff participation, I verbally confirmed the patient s identity using two indicators, relevant allergies, that the procedure was appropriate and matched the consent or emergent situation, and that the correct equipment/implants were available. Immediately prior to starting the procedure I conducted the Time Out with the procedural staff and re-confirmed the patient s name, procedure, and site/side. (The Joint Commission universal protocol was followed.)  Yes        Sedation: None. Local Anesthestic used    Findings: Patient presents for diagnostic and therapeutic left-sided thoracentesis.  Patient has a very small fluid pocket on the left side.  Additionally fluid appears somewhat complex loculated.  After anesthetizing the area a 5 [place] Yueh catheter was placed into the fluid pocket and about 12 mL of clear albaro fluid was removed and sent for diagnostic testing.  No additional fluid was able to be aspirated upon removal of the catheter there was a small amount of blood noted.  Can again with the ultrasound there appeared to be another distinct area of fluid a bit more superior to the previous tap.  A second Yueh catheter was placed which returned about 8 mL sanguinous fluid.  It is unclear if this was from the initial puncture or if patient had a second locule of fluid in the space that was more sanguinous in appearance.  Patient did have some swelling and evidence of a mild hematoma at the skin which points towards some bleeding related to the initial puncture.  Pressure was held on the area under ultrasound visualization the area of effusion did not appear to grow following the tap.  Patient did not have an increase in size in subcutaneous swelling or hematoma.  Ice pack applied to the area patient  remained stable in recovery.    Estimated Blood Loss: Minimal    SPECIMENS: Fluid and/or tissue for laboratory analysis    Complications: 1. None     Condition: Stable    Plan: Follow-up per primary team. If patient has any increased respiratory effort or chest pressure she should present to the emergency department for evaluation.     Comments: See dictated procedure note for full details.    Bo Capone PA-C

## 2022-08-29 NOTE — TELEPHONE ENCOUNTER
"Critical value: Bicarb 46  Other labs: Hemoglobin 7.1, Mag 2.8, CO2 71.     Per Dr. Vásquez  -not concerned about VBG as this appears to be patients baseline (had lot's of workup done in the hospital prior to discharge for hypercapnia and regardless of using BiPAP, VBG remained unchanged. Repeat VBG every 2 weeks for now.  -When patient comes to clinic Thursday this week, have drawn: peripheral smear, fecal occult blood, LDH, haptoglobin, reticuloyte count.  -Provider aware pt may need unit of blood at next clinic visit.     Addendum: Report back from thoracentesis. Pt left wild \"mild hematoma\" This writer spoke to patient on the phone- denies SOB, dizziness, lightheaded. Knows to monitor for these signs and report and s/s of bleeding to transplant office right away.     Pt scheduled to receive 1 unit PRBC after clinic appt. Thursday. Appt scheduled for 2:30 PM (only time they had available- will instruct pt to check in early in case they can start infusion early)  "

## 2022-08-30 ENCOUNTER — HOSPITAL ENCOUNTER (OUTPATIENT)
Dept: CARDIAC REHAB | Facility: CLINIC | Age: 62
Discharge: HOME | End: 2022-08-30
Attending: INTERNAL MEDICINE
Payer: MEDICARE

## 2022-08-30 LAB
CMV DNA SPEC NAA+PROBE-ACNC: NOT DETECTED IU/ML
IGG SERPL-MCNC: 672 MG/DL (ref 610–1616)
LD BODY BODY FLUID SOURCE: NORMAL
LDH FLD L TO P-CCNC: 131 U/L

## 2022-08-30 PROCEDURE — G0238 OTH RESP PROC, INDIV: HCPCS

## 2022-08-30 NOTE — RESULT ENCOUNTER NOTE
Tacrolimus level 14.5 8/29- Not a 12 hour level. Will get 12 hour trough 9/1 this week at clinic appt.

## 2022-08-31 ENCOUNTER — HOSPITAL ENCOUNTER (EMERGENCY)
Facility: CLINIC | Age: 62
Discharge: HOME | End: 2022-08-31
Attending: EMERGENCY MEDICINE | Admitting: EMERGENCY MEDICINE
Payer: MEDICARE

## 2022-08-31 ENCOUNTER — APPOINTMENT (OUTPATIENT)
Dept: INTERVENTIONAL RADIOLOGY/VASCULAR | Facility: CLINIC | Age: 62
End: 2022-08-31
Attending: PHYSICIAN ASSISTANT
Payer: MEDICARE

## 2022-08-31 ENCOUNTER — TELEPHONE (OUTPATIENT)
Dept: TRANSPLANT | Facility: CLINIC | Age: 62
End: 2022-08-31

## 2022-08-31 VITALS
SYSTOLIC BLOOD PRESSURE: 147 MMHG | RESPIRATION RATE: 15 BRPM | OXYGEN SATURATION: 98 % | TEMPERATURE: 98.5 F | DIASTOLIC BLOOD PRESSURE: 73 MMHG | HEART RATE: 89 BPM

## 2022-08-31 DIAGNOSIS — Z93.4 GASTROJEJUNOSTOMY TUBE STATUS (H): Primary | ICD-10-CM

## 2022-08-31 LAB
ABO/RH(D): NORMAL
ALBUMIN SERPL BCG-MCNC: 3.3 G/DL (ref 3.5–5.2)
ALP SERPL-CCNC: 102 U/L (ref 35–104)
ALT SERPL W P-5'-P-CCNC: 13 U/L (ref 10–35)
ANION GAP SERPL CALCULATED.3IONS-SCNC: 7 MMOL/L (ref 7–15)
ANTIBODY SCREEN: NEGATIVE
AST SERPL W P-5'-P-CCNC: 20 U/L (ref 10–35)
BILIRUB SERPL-MCNC: 0.2 MG/DL
BLD PROD TYP BPU: NORMAL
BLOOD COMPONENT TYPE: NORMAL
BUN SERPL-MCNC: 53.8 MG/DL (ref 8–23)
CALCIUM SERPL-MCNC: 9 MG/DL (ref 8.8–10.2)
CHLORIDE SERPL-SCNC: 97 MMOL/L (ref 98–107)
CODING SYSTEM: NORMAL
CREAT SERPL-MCNC: 1.3 MG/DL (ref 0.51–0.95)
CROSSMATCH: NORMAL
DEPRECATED HCO3 PLAS-SCNC: 41 MMOL/L (ref 22–29)
GFR SERPL CREATININE-BSD FRML MDRD: 47 ML/MIN/1.73M2
GLUCOSE BLDC GLUCOMTR-MCNC: 101 MG/DL (ref 70–99)
GLUCOSE BLDC GLUCOMTR-MCNC: 121 MG/DL (ref 70–99)
GLUCOSE BLDC GLUCOMTR-MCNC: 139 MG/DL (ref 70–99)
GLUCOSE BLDC GLUCOMTR-MCNC: 55 MG/DL (ref 70–99)
GLUCOSE BLDC GLUCOMTR-MCNC: 68 MG/DL (ref 70–99)
GLUCOSE SERPL-MCNC: 65 MG/DL (ref 70–99)
HOLD SPECIMEN: NORMAL
ISSUE DATE AND TIME: NORMAL
POTASSIUM SERPL-SCNC: 3.3 MMOL/L (ref 3.4–5.3)
PROT SERPL-MCNC: 6 G/DL (ref 6.4–8.3)
SODIUM SERPL-SCNC: 145 MMOL/L (ref 136–145)
SPECIMEN EXPIRATION DATE: NORMAL
UNIT ABO/RH: NORMAL
UNIT NUMBER: NORMAL
UNIT STATUS: NORMAL
UNIT TYPE ISBT: 5100

## 2022-08-31 PROCEDURE — 49452 REPLACE G-J TUBE PERC: CPT

## 2022-08-31 PROCEDURE — 96361 HYDRATE IV INFUSION ADD-ON: CPT | Performed by: EMERGENCY MEDICINE

## 2022-08-31 PROCEDURE — 80053 COMPREHEN METABOLIC PANEL: CPT | Performed by: EMERGENCY MEDICINE

## 2022-08-31 PROCEDURE — 255N000002 HC RX 255 OP 636: Performed by: EMERGENCY MEDICINE

## 2022-08-31 PROCEDURE — C1887 CATHETER, GUIDING: HCPCS

## 2022-08-31 PROCEDURE — 99283 EMERGENCY DEPT VISIT LOW MDM: CPT | Performed by: EMERGENCY MEDICINE

## 2022-08-31 PROCEDURE — 96360 HYDRATION IV INFUSION INIT: CPT | Mod: 59 | Performed by: EMERGENCY MEDICINE

## 2022-08-31 PROCEDURE — 99283 EMERGENCY DEPT VISIT LOW MDM: CPT | Mod: 25 | Performed by: EMERGENCY MEDICINE

## 2022-08-31 PROCEDURE — 36415 COLL VENOUS BLD VENIPUNCTURE: CPT | Performed by: EMERGENCY MEDICINE

## 2022-08-31 PROCEDURE — C1769 GUIDE WIRE: HCPCS

## 2022-08-31 PROCEDURE — 258N000003 HC RX IP 258 OP 636: Performed by: EMERGENCY MEDICINE

## 2022-08-31 PROCEDURE — 258N000001 HC RX 258

## 2022-08-31 PROCEDURE — 49452 REPLACE G-J TUBE PERC: CPT | Performed by: RADIOLOGY

## 2022-08-31 RX ORDER — DEXTROSE MONOHYDRATE 25 G/50ML
50 INJECTION, SOLUTION INTRAVENOUS ONCE
Status: COMPLETED | OUTPATIENT
Start: 2022-08-31 | End: 2022-08-31

## 2022-08-31 RX ORDER — HEPARIN SODIUM,PORCINE 10 UNIT/ML
5 VIAL (ML) INTRAVENOUS
Status: CANCELLED | OUTPATIENT
Start: 2022-08-31

## 2022-08-31 RX ORDER — DEXTROSE MONOHYDRATE 25 G/50ML
INJECTION, SOLUTION INTRAVENOUS
Status: COMPLETED
Start: 2022-08-31 | End: 2022-08-31

## 2022-08-31 RX ORDER — IODIXANOL 320 MG/ML
100 INJECTION, SOLUTION INTRAVASCULAR ONCE
Status: COMPLETED | OUTPATIENT
Start: 2022-08-31 | End: 2022-08-31

## 2022-08-31 RX ORDER — LIDOCAINE HYDROCHLORIDE 20 MG/ML
JELLY TOPICAL ONCE
Status: DISCONTINUED | OUTPATIENT
Start: 2022-08-31 | End: 2022-08-31 | Stop reason: HOSPADM

## 2022-08-31 RX ORDER — HEPARIN SODIUM (PORCINE) LOCK FLUSH IV SOLN 100 UNIT/ML 100 UNIT/ML
5 SOLUTION INTRAVENOUS
Status: CANCELLED | OUTPATIENT
Start: 2022-08-31

## 2022-08-31 RX ADMIN — DEXTROSE MONOHYDRATE 50 ML: 25 INJECTION, SOLUTION INTRAVENOUS at 10:22

## 2022-08-31 RX ADMIN — IODIXANOL 25 ML: 320 INJECTION, SOLUTION INTRAVASCULAR at 15:57

## 2022-08-31 RX ADMIN — DEXTROSE 50 % IN WATER (D50W) INTRAVENOUS SYRINGE 50 ML: at 10:22

## 2022-08-31 RX ADMIN — DEXTROSE AND SODIUM CHLORIDE: 5; 900 INJECTION, SOLUTION INTRAVENOUS at 13:43

## 2022-08-31 ASSESSMENT — ACTIVITIES OF DAILY LIVING (ADL)
ADLS_ACUITY_SCORE: 33
ADLS_ACUITY_SCORE: 33
ADLS_ACUITY_SCORE: 35

## 2022-08-31 NOTE — TELEPHONE ENCOUNTER
Pt sent [employer] message saying her peg tube was clogged again. Unable to flush water this morning- no morning meds given yet. Pt and daughter went to ED.     Pt took morning insulin around 0800, no TF/meds given since then. Pt checked in to ED around 0945. Writer told patient she needs to tell ED she took morning insulin and needs BG checked right away.     Writer and patient will plan to touch base after peg tube is unclogged.

## 2022-08-31 NOTE — PROCEDURES
[facility]    Procedure: IR Procedure Note    Date/Time: 8/31/2022 3:56 PM  Performed by: Ellyn Alvarado PA-C  Authorized by: Ellyn Alvarado PA-C   IR Fellow Physician: Dr. Mabry      UNIVERSAL PROTOCOL   Site Marked: NA  Prior Images Obtained and Reviewed:  Yes  Required items: Required blood products, implants, devices and special equipment available    Patient identity confirmed:  Verbally with patient, arm band, provided demographic data and hospital-assigned identification number  Patient was reevaluated immediately before administering moderate or deep sedation or anesthesia  Confirmation Checklist:  Patient's identity using two indicators, relevant allergies, procedure was appropriate and matched the consent or emergent situation and correct equipment/implants were available  Time out: Immediately prior to the procedure a time out was called    Universal Protocol: the Joint Commission Universal Protocol was followed    Preparation: Patient was prepped and draped in usual sterile fashion       ANESTHESIA    Anesthesia: Local infiltration  Local Anesthetic:  Lidocaine 1% without epinephrine      SEDATION    Patient Sedated: No    See dictated procedure note for full details.  Findings: Local     Specimens: none    Complications: None    Condition: Stable    Plan: Transport back to ED for discharge      PROCEDURE  Describe Procedure: Completed image guided gastrojejunostomy tube change for new 18FR x45 cm GJ tube.  RTC in 9-12 months for routine exchange, sooner if malfunction   Patient Tolerance:  Patient tolerated the procedure well with no immediate complications  Length of time physician/provider present for 1:1 monitoring during sedation: 0

## 2022-08-31 NOTE — DISCHARGE INSTRUCTIONS
Follow-up with your physicians as scheduled.    You may use gastrojejunostomy tube as per usual.    Return to the emergency department for any problems.

## 2022-08-31 NOTE — ED TRIAGE NOTES
Pt states she has had feeding tube clogged since 0800 today. She denies Abdominal pain. BG 68 in triage. She states last time she had this, they were able to get it unclogged.      Triage Assessment     Row Name 08/31/22 1007       Triage Assessment (Adult)    Airway WDL WDL       Respiratory WDL    Respiratory WDL WDL       Skin Circulation/Temperature WDL    Skin Circulation/Temperature WDL WDL       Cardiac WDL    Cardiac WDL WDL       Peripheral/Neurovascular WDL    Peripheral Neurovascular WDL WDL       Cognitive/Neuro/Behavioral WDL    Cognitive/Neuro/Behavioral WDL WDL

## 2022-08-31 NOTE — PROGRESS NOTES
Transplant Coordinator Note    Reason for visit: Post lung transplant follow up visit   Coordinator: Present   Caregiver:  Daughter    Health concerns addressed today:  1. Respiratory - shortness of breath with activity.   2. GI: nausea/diarrhea  3.  Difficulty sleeping d/t pain    Activity/rehab: pulmonary rehab eval done. Start rehab Friday, 9/2  Oxygen needs: 0.5-1L O2  Pain management/RX: Incisional pain- feels tight. Sharp pains at times. Use your tylenol as needed.   Diabetic management: on insulin/lantus  Next Bronch due: POD 60  Risk Criteria Labs: negative 7/28/22  CMV status: D+/R+  Valcyte stopped: POD 8-90  EBV status: D+/R+  AC/asa:  On ASA 81mg  PJP prophylactic: Dapsone     COVID:  1. COVID-19 infection (yes/no, date of most recent positive test):   2. Status/instructions given about COVID-19 vaccine: Vaccinated, booster x2 last 3/21/22. Received Evusheld 8/24/2022. Next due 2/24/2023    Pt Education: medications (use/dose/side effects), how/when to call coordinator, frequency of labs, s/s of infection/rejection, call prior to starting any new medications, lab/vital sign book    Health Maintenance:     Last colonoscopy:     Next colonoscopy due:     Dermatology:    Vaccinations this visit:     Labs, CXR, PFTs reviewed with patient  Medication record reviewed and reconciled  Questions and concerns addressed    Patient Instructions  1. Continue to hydrate with 60-70 oz fluids daily.  2. Continue to exercise daily or most days of the week.  3. It doesn't seem like the COVID vaccine is working well in lung transplant patients. A number of lung transplant patients have gotten sick with COVID even after receiving the vaccines.  Based on our recent experience, it can be life-threatening to get COVID  even after being vaccinated. Please continue to act like you did not get the COVID vaccine - social distancing, wearing a mask, good hand hygiene, etc. If the people around you are vaccinated, it will help  reduce the risk of you getting COVID. All members of your household should be vaccinated.  4. We are going to have you take Lasix 20mg twice a day (8AM and 1pm) for a week  5. Take Zofran 30 minutes before your morning meds.   6. Take tylenol, a heat pack, and lidocaine patches at bedtime. If that doesn't help, then take an oxycodone 5mg at bedtime as well.   7. Please please please call the transplant office if you have any questions!    Next transplant clinic appointment: 9/8 with CXR, labs and PFTs before appointment with Kaylin Triana  Next lab draw: weekly      AVS printed at time of check out

## 2022-08-31 NOTE — ED PROVIDER NOTES
ED Provider Note  [facility]      History     Chief Complaint   Patient presents with     Feeding Problems     The history is provided by the patient and medical records.     Sofie Rodriguez is a 62 year old female with complex PMH significant for S/p BSLT on 6/28/22 with post-operative course c/b gastroparesis s/p GJ tube placement in IR 7/27, coffee ground G tube output s/p EGD 8/3 with pyloric ulcer noted, melena with hgb drop (8/8), elevated LFTs (8/8) with extrahepatic mass on US and MRCP with increase in biliary dilation, S/p ERCP 8/11 with findings concerning for hemobilia, but no bleeding from ulcer in pyloric channel, with recent obstructed J tube 8/19 presenting to the ED with malfunctioning J-tube. Patient's daughter reports that she tried to flush the line with water this morning prior to giving meds and could not get anything through. She plunged the line for 30 minutes with warm water and was still unsuccessful. Patient takes only glucose tabs and pleasure bites by mouth.     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     Hepatitis 2017    Hep C, [facility]     HTN (hypertension)      Osteopenia      Past Surgical History:   Procedure Laterality Date     BRONCHOSCOPY (RIGID OR FLEXIBLE), DIAGNOSTIC N/A 8/2/2022    Procedure: BRONCHOSCOPY, DIAGNOSTIC- inspection Bronch;  Surgeon: Kamala Lovell MD;  Location:  GI     BRONCHOSCOPY FLEXIBLE AND RIGID N/A 07/19/2022    Procedure: BRONCHOSCOPY inspection only;  Surgeon: Bob Liao MD;  Location:  GI     COLONOSCOPY  2015     CV CORONARY ANGIOGRAM N/A 06/30/2021    Procedure: CV CORONARY ANGIOGRAM;  Surgeon: Alexander Cuellar MD;  Location:  HEART CARDIAC CATH LAB     CV RIGHT HEART CATH MEASUREMENTS RECORDED N/A 06/30/2021     Procedure: CV RIGHT HEART CATH;  Surgeon: Alexander Cuellar MD;  Location:  HEART CARDIAC CATH LAB     ENDOSCOPIC RETROGRADE CHOLANGIOPANCREATOGRAM N/A 8/11/2022    Procedure: ENDOSCOPIC RETROGRADE CHOLANGIOPANCREATOGRAPHY WITH PANCREATIC DUCT NEEDLE KNIFE AND STENT PLACEMENT, BILE DUCT SPHINCTEROTOMY, BLOOD/DEBRIS REMOVAL AND STENT PLACEMENT;  Surgeon: Cosmo Arroyo MD;  Location:  OR     ENT SURGERY  1974    tonsillectomy     ENTEROSCOPY SMALL BOWEL N/A 8/11/2022    Procedure: SMALL BOWEL ENTEROSCOPY;  Surgeon: Cosmo Arroyo MD;  Location: [facility] OR     ESOPHAGOGASTRODUODENOSCOPY, WITH NASOGASTRIC TUBE INSERTION N/A 07/01/2022    Procedure: ESOPHAGOGASTRODUODENOSCOPY, WITH NASOJEJUNAL TUBE INSERTION;  Surgeon: Ozzy Nickerson MD;  Location:  GI     ESOPHAGOSCOPY, GASTROSCOPY, DUODENOSCOPY (EGD), COMBINED N/A 8/3/2022    Procedure: ESOPHAGOGASTRODUODENOSCOPY (EGD);  Surgeon: Ira Andres MD;  Location:  GI     HAND SURGERY       IR GASTRO JEJUNOSTOMY TUBE CHANGE  8/31/2022     IR GASTRO JEJUNOSTOMY TUBE PLACEMENT  7/27/2022     IR THORACENTESIS  8/29/2022     LEEP TX, CERVICAL  04/07/2017    HECTOR III     LYMPH NODE BIOPSY Left 2005    Left axilla, benign- Page     MIDLINE INSERTION - DOUBLE LUMEN Left 07/28/2022    20cm, Basilic vein     THORACENTESIS Left 8/29/2022    Procedure: THORACENTESIS;  Surgeon: Bo Capone PA-C;  Location: [facility] OR     THROMBECTOMY UPPER EXTREMITY Left 07/02/2022    Procedure: LEFT RADIAL ARM THROMBECTOMY;  Surgeon: Christie Graham MD;  Location:  OR     TRANSPLANT LUNG RECIPIENT SINGLE X2 Bilateral 06/28/2022    Procedure: Clamshell Incision, Bilateral Sequential Lung Transplant, On Cardiopulmonary Bypass, Flexible Bronchoscopy;  Surgeon: Sue Sunshine MD;  Location: [facility] OR     acetaminophen (TYLENOL) 160 MG/5ML liquid  alcohol swab prep pads  aspirin (ASA) 81 MG EC tablet  blood glucose (CONTOUR NEXT TEST) test  strip  blood glucose (NO BRAND SPECIFIED) lancets standard  blood glucose monitoring (CONTOUR NEXT MONITOR W/DEVICE KIT) meter device kit  calcium carbonate 600 mg-vitamin D 400 units (CALTRATE) 600-400 MG-UNIT per tablet  furosemide (LASIX) 20 MG tablet  insulin aspart (NOVOLOG PEN) 100 UNIT/ML pen  insulin glargine (LANTUS PEN) 100 UNIT/ML pen  insulin pen needle (31G X 5 MM) 31G X 5 MM miscellaneous  Lidocaine (LIDOCARE) 4 % Patch  loperamide (IMODIUM A-D) 2 MG tablet  metoprolol tartrate (LOPRESSOR) 50 MG tablet  Multiple Vitamins-Minerals (MULTIVITAMINS W/MINERALS) liquid  mycophenolate (GENERIC EQUIVALENT) 200 MG/ML suspension  Nutritional Supplements (GLUCOSE MANAGEMENT) TABS  nystatin (MYCOSTATIN) 351431 UNIT/ML suspension  ondansetron (ZOFRAN ODT) 4 MG ODT tab  oxyCODONE (ROXICODONE) 5 MG tablet  pantoprazole (PROTONIX) 2 mg/mL SUSP suspension  predniSONE (DELTASONE) 5 MG tablet  protein modular (PROSOURCE TF) LIQD  [employer]arps Container MISC  simethicone (MYLICON) 40 MG/0.6ML suspension  tacrolimus (GENERIC EQUIVALENT) 1 mg/mL suspension  valGANciclovir (VALCYTE) 50 MG/ML solution      Allergies   Allergen Reactions     Blood Transfusion Related (Informational Only) Other (See Comments)     Patient has a history of a clinically significant antibody against RBC antigens.  A delay in compatible RBCs may occur.      Family History  No family history on file.  Social History   Social History     Tobacco Use     Smoking status: Former Smoker     Years: 30.00     Types: Cigarettes     Quit date: 2020     Years since quittin.8     Smokeless tobacco: Never Used   Substance Use Topics     Alcohol use: Not Currently     Drug use: Not Currently     Types: Marijuana, Methamphetamines     Comment: hx:marijuana and methamphetamine-quit both unsure ?  2-3 yrs ago      Past medical history, past surgical history, medications, allergies, family history, and social history were reviewed with the patient. No additional  pertinent items.       Review of Systems   Gastrointestinal:        Malfunctioning J tube       A complete review of systems was performed with pertinent positives and negatives noted in the HPI, and all other systems negative.    Physical Exam   BP: (!) 146/76  Pulse: 98  Temp: 98.5  F (36.9  C)  Resp: 15  SpO2: 99 %  Physical Exam  Vitals and nursing note reviewed.   Constitutional:       General: She is not in acute distress.     Appearance: She is well-developed. She is not ill-appearing or toxic-appearing.   HENT:      Head: Normocephalic and atraumatic.   Eyes:      General: No scleral icterus.     Pupils: Pupils are equal, round, and reactive to light.   Cardiovascular:      Rate and Rhythm: Normal rate.   Pulmonary:      Effort: Pulmonary effort is normal. No respiratory distress.   Abdominal:      Comments: Gastrojejunostomy tube site without erythema, swelling, discharge.   Musculoskeletal:      Cervical back: Normal range of motion and neck supple.   Skin:     General: Skin is warm and dry.      Coloration: Skin is not pale.      Findings: No rash.   Neurological:      Mental Status: She is alert and oriented to person, place, and time.      Sensory: No sensory deficit.   Psychiatric:         Behavior: Behavior normal.         ED Course      Procedures                     Results for orders placed or performed during the hospital encounter of 08/31/22   IR Procedure Note     Status: None    Narrative    Ellyn Alvarado PA-C     8/31/2022  3:59 PM  [facility]    Procedure: IR Procedure Note    Date/Time: 8/31/2022 3:56 PM  Performed by: Ellyn Alvarado PA-C  Authorized by: Ellyn Alvarado PA-C   IR Fellow Physician: Dr. Mabry      UNIVERSAL PROTOCOL   Site Marked: NA  Prior Images Obtained and Reviewed:  Yes  Required items: Required blood products, implants, devices and special   equipment available    Patient identity confirmed:  Verbally with patient, arm  band, provided   demographic data and hospital-assigned identification number  Patient was reevaluated immediately before administering moderate or deep   sedation or anesthesia  Confirmation Checklist:  Patient's identity using two indicators, relevant   allergies, procedure was appropriate and matched the consent or emergent   situation and correct equipment/implants were available  Time out: Immediately prior to the procedure a time out was called    Universal Protocol: the Joint Commission Universal Protocol was followed    Preparation: Patient was prepped and draped in usual sterile fashion       ANESTHESIA    Anesthesia: Local infiltration  Local Anesthetic:  Lidocaine 1% without epinephrine      SEDATION    Patient Sedated: No    See dictated procedure note for full details.  Findings: Local     Specimens: none    Complications: None    Condition: Stable    Plan: Transport back to ED for discharge      PROCEDURE  Describe Procedure: Completed image guided gastrojejunostomy tube change   for new 18FR x45 cm GJ tube.  RTC in 9-12 months for routine exchange,   sooner if malfunction   Patient Tolerance:  Patient tolerated the procedure well with no immediate   complications  Length of time physician/provider present for 1:1 monitoring during   sedation: 0   IR Gastro Jejunostomy Tube Change     Status: None    Narrative    DIAGNOSIS: Gastrojejunostomy Tube malfunction     PROCEDURE: Gastrojejunostomy tube exchange    Impression    IMPRESSION: Completed fluoroscopy-guided exchange of gastrojejunostomy  tube. New 18 Arabic x 45 cm gastrostomy tube ready for immediate use.    PLAN: Patient should return in 9-12 months for routine exchange, or  sooner if necessary.  Order placed.      ----------    CLINICAL HISTORY: 62 year old female s/p bilateral lung transplant  6/28/22?with post-operative course c/b?gastroparesis s/p GJ tube  placement in IR 7/27/22 now presents to ED with clogged jejunal lumen  since this  morning.  Patient is entirely dependent on tube feeds and  tried various means to declog the tube.     PERFORMED BY:  Dr. Abel Mabry    Assistant:  Ellyn Alvarado PA-C    CONSENT: Written informed consent was obtained and is documented in  the patient record.    MEDICATIONS: Lidocaine Jelly     DESCRIPTION: Lidocaine gel was used to anesthetize the tube tract. A  single t fastener noted.  Multiple attempts to pass angled glidewire  met with resistance.  Unable to pass through distal end of the tube  A  Kumpe catheter with Bentson finally advanced along the existing GJ  tube.  GJ existing tube balloon was deflated and gastrojejunostomy  tube removed. New 18FR x 45 cm GJ tube selected, prepared and inserted  into existing tract.. Adequate placement of gastric and jejunal ports  documented with contrast and images were saved.  Dr. Mabry called  for assistance.  Angled glidewire advanced, retention balloon deflated  and contrast injection performed and images saved.  Adequate placement  of GJ tube.  Retention balloon filled with 8 mL saline.  Single t  fastener removed.      COMPLICATIONS: No immediate concerns, the patient remained stable  throughout the procedure and tolerated it well.    ESTIMATED BLOOD LOSS: None    SPECIMENS: None    ABEL MABRY MD         SYSTEM ID:  [id number]   Glucose by meter     Status: Abnormal   Result Value Ref Range    GLUCOSE BY METER POCT 68 (L) 70 - 99 mg/dL   Comprehensive metabolic panel     Status: Abnormal   Result Value Ref Range    Sodium 145 136 - 145 mmol/L    Potassium 3.3 (L) 3.4 - 5.3 mmol/L    Creatinine 1.30 (H) 0.51 - 0.95 mg/dL    Urea Nitrogen 53.8 (H) 8.0 - 23.0 mg/dL    Chloride 97 (L) 98 - 107 mmol/L    Carbon Dioxide (CO2) 41 (H) 22 - 29 mmol/L    Anion Gap 7 7 - 15 mmol/L    Glucose 65 (L) 70 - 99 mg/dL    Calcium 9.0 8.8 - 10.2 mg/dL    Protein Total 6.0 (L) 6.4 - 8.3 g/dL    Albumin 3.3 (L) 3.5 - 5.2 g/dL    Bilirubin Total 0.2 <=1.2 mg/dL    Alkaline  Phosphatase 102 35 - 104 U/L    AST 20 10 - 35 U/L    ALT 13 10 - 35 U/L    GFR Estimate 47 (L) >60 mL/min/1.73m2   [place] Draw     Status: None    Narrative    The following orders were created for panel order [place] Draw.  Procedure                               Abnormality         Status                     ---------                               -----------         ------                     Extra Blue Top Tube[[id number]]                              Final result               Extra Red Top Tube[[id number]]                               Final result               Extra Green Top (Lithium...[[id number]]                      Final result               Extra Purple Top Tube[[id number]]                            Final result                 Please view results for these tests on the individual orders.   Extra Blue Top Tube     Status: None   Result Value Ref Range    Hold Specimen JIC    Extra Red Top Tube     Status: None   Result Value Ref Range    Hold Specimen JIC    Extra Green Top (Lithium Heparin) Tube     Status: None   Result Value Ref Range    Hold Specimen JIC    Extra Purple Top Tube     Status: None   Result Value Ref Range    Hold Specimen JIC    Glucose by meter     Status: Abnormal   Result Value Ref Range    GLUCOSE BY METER POCT 139 (H) 70 - 99 mg/dL   Glucose by meter     Status: Abnormal   Result Value Ref Range    GLUCOSE BY METER POCT 55 (L) 70 - 99 mg/dL   Glucose by meter     Status: Abnormal   Result Value Ref Range    GLUCOSE BY METER POCT 101 (H) 70 - 99 mg/dL   Glucose by meter     Status: Abnormal   Result Value Ref Range    GLUCOSE BY METER POCT 121 (H) 70 - 99 mg/dL     Medications   dextrose 50 % injection 50 mL (50 mLs Intravenous Given 8/31/22 1022)   iodixanol (VISIPAQUE 320) injection 100 mL (25 mLs Tube Given 8/31/22 6307)        Assessments & Plan (with Medical Decision Making)     This patient presented to the emergency department due to malfunctioning gastrojejunostomy tube.   We were unable to clear the occlusion by flushing or by using clog zapper.  Interventional radiology was consulted and they were able to replace the tube.  Patient did require frequent doses of intravenous D50 as blood sugar was noted to be low most likely secondary to interruption of enteral feeding.  She will be able to reinitiate her feeds when she gets home and only lives 2 blocks away.  Blood sugar was within normal limits at time of discharge.    I have reviewed the nursing notes. I have reviewed the findings, diagnosis, plan and need for follow up with the patient.    Discharge Medication List as of 8/31/2022  4:48 PM          Final diagnoses:   Gastrojejunostomy tube status (H)   I, Andreina Uriarte, am serving as a trained medical scribe to document services personally performed by Tim Erwin MD, based on the provider's statements to me.     I, Tim Erwin MD, was physically present and have reviewed and verified the accuracy of this note documented by Andreina Uriarte.      --  Tim Erwin  [facility] EMERGENCY DEPARTMENT  8/31/2022     Tim Erwin MD  09/03/22 [id number]

## 2022-08-31 NOTE — IR NOTE
Patient Name: Sofie Rodriguez  Medical Record Number: [id number]  Today's Date: 8/31/2022    Procedure: Image guided gastrojejunostomy tube exchange  Proceduralist: Ellyn Alvarado PA-C      Procedure Start: 1507                                        Procedure end: 1543   Sedation medications administered: local lidocaine.     Report given to:  RN ED      Other Notes: Pt arrived to IR room 5 from ED. Consent reviewed. Pt denies any questions or concerns regarding procedure. Pt positioned supine and monitored per protocol. Pt tolerated procedure without any noted complications. Pt transferred back to ED.

## 2022-08-31 NOTE — CONSULTS
Clogged jejunal lumen of her GJ tube. Fully dependent on J feeds. Hypoglycemic in ED. Placed by IR on 7/27/22. 18 [place], 45 cm. Will qualify for discharge home once tube has been optimized.    Rakesh Dewey PA-C  Interventional Radiology  [phone]

## 2022-09-01 ENCOUNTER — TELEPHONE (OUTPATIENT)
Dept: TRANSPLANT | Facility: CLINIC | Age: 62
End: 2022-09-01

## 2022-09-01 ENCOUNTER — OFFICE VISIT (OUTPATIENT)
Dept: TRANSPLANT | Facility: CLINIC | Age: 62
End: 2022-09-01
Attending: PHYSICIAN ASSISTANT
Payer: MEDICARE

## 2022-09-01 ENCOUNTER — INFUSION THERAPY VISIT (OUTPATIENT)
Dept: INFUSION THERAPY | Facility: CLINIC | Age: 62
End: 2022-09-01
Attending: INTERNAL MEDICINE
Payer: MEDICARE

## 2022-09-01 ENCOUNTER — LAB (OUTPATIENT)
Dept: LAB | Facility: CLINIC | Age: 62
End: 2022-09-01
Payer: MEDICARE

## 2022-09-01 ENCOUNTER — ANCILLARY PROCEDURE (OUTPATIENT)
Dept: GENERAL RADIOLOGY | Facility: CLINIC | Age: 62
End: 2022-09-01
Payer: MEDICARE

## 2022-09-01 VITALS
HEART RATE: 100 BPM | SYSTOLIC BLOOD PRESSURE: 138 MMHG | RESPIRATION RATE: 18 BRPM | OXYGEN SATURATION: 99 % | DIASTOLIC BLOOD PRESSURE: 94 MMHG | TEMPERATURE: 99.4 F

## 2022-09-01 VITALS
OXYGEN SATURATION: 97 % | RESPIRATION RATE: 28 BRPM | DIASTOLIC BLOOD PRESSURE: 82 MMHG | SYSTOLIC BLOOD PRESSURE: 146 MMHG | BODY MASS INDEX: 27.46 KG/M2 | WEIGHT: 155 LBS | TEMPERATURE: 98.1 F | HEART RATE: 96 BPM | HEIGHT: 63 IN

## 2022-09-01 DIAGNOSIS — K25.4 GASTROINTESTINAL HEMORRHAGE ASSOCIATED WITH GASTRIC ULCER: Primary | ICD-10-CM

## 2022-09-01 DIAGNOSIS — D64.9 ANEMIA, UNSPECIFIED TYPE: ICD-10-CM

## 2022-09-01 DIAGNOSIS — G89.18 POST-OPERATIVE PAIN: ICD-10-CM

## 2022-09-01 DIAGNOSIS — D63.8 ANEMIA IN OTHER CHRONIC DISEASES CLASSIFIED ELSEWHERE: Primary | ICD-10-CM

## 2022-09-01 DIAGNOSIS — D64.9 ANEMIA: ICD-10-CM

## 2022-09-01 DIAGNOSIS — R52 GENERALIZED PAIN: ICD-10-CM

## 2022-09-01 DIAGNOSIS — Z94.2 LUNG REPLACED BY TRANSPLANT (H): ICD-10-CM

## 2022-09-01 DIAGNOSIS — Z00.6 RESEARCH STUDY PATIENT: ICD-10-CM

## 2022-09-01 DIAGNOSIS — Z79.899 ENCOUNTER FOR LONG-TERM (CURRENT) USE OF HIGH-RISK MEDICATION: ICD-10-CM

## 2022-09-01 DIAGNOSIS — K59.1 FUNCTIONAL DIARRHEA: ICD-10-CM

## 2022-09-01 DIAGNOSIS — R60.9 SWELLING: Primary | ICD-10-CM

## 2022-09-01 DIAGNOSIS — Z94.2 LUNG TRANSPLANT RECIPIENT (H): ICD-10-CM

## 2022-09-01 DIAGNOSIS — Z94.2 LUNG REPLACED BY TRANSPLANT (H): Primary | ICD-10-CM

## 2022-09-01 DIAGNOSIS — Z94.2 S/P LUNG TRANSPLANT (H): ICD-10-CM

## 2022-09-01 LAB
ANION GAP SERPL CALCULATED.3IONS-SCNC: 11 MMOL/L (ref 7–15)
BASOPHILS # BLD AUTO: 0 10E3/UL (ref 0–0.2)
BASOPHILS NFR BLD AUTO: 0 %
BUN SERPL-MCNC: 50.3 MG/DL (ref 8–23)
CALCIUM SERPL-MCNC: 9 MG/DL (ref 8.8–10.2)
CHLORIDE SERPL-SCNC: 98 MMOL/L (ref 98–107)
CMV DNA SPEC NAA+PROBE-ACNC: NOT DETECTED IU/ML
CREAT SERPL-MCNC: 1.21 MG/DL (ref 0.51–0.95)
DEPRECATED HCO3 PLAS-SCNC: 37 MMOL/L (ref 22–29)
EOSINOPHIL # BLD AUTO: 0.1 10E3/UL (ref 0–0.7)
EOSINOPHIL NFR BLD AUTO: 1 %
ERYTHROCYTE [DISTWIDTH] IN BLOOD BY AUTOMATED COUNT: 18.2 % (ref 10–15)
EXPTIME-PRE: 4.08 SEC
FEF2575-%PRED-PRE: 106 %
FEF2575-PRE: 2.37 L/SEC
FEF2575-PRED: 2.22 L/SEC
FEFMAX-%PRED-PRE: 65 %
FEFMAX-PRE: 4 L/SEC
FEFMAX-PRED: 6.14 L/SEC
FEV1-%PRED-PRE: 42 %
FEV1-PRE: 1.02 L
FEV1FEV6-PRE: 95 %
FEV1FEV6-PRED: 81 %
FEV1FVC-PRE: 95 %
FEV1FVC-PRED: 79 %
FIFMAX-PRE: 2.88 L/SEC
FOLATE SERPL-MCNC: >40 NG/ML (ref 4.6–34.8)
FVC-%PRED-PRE: 35 %
FVC-PRE: 1.07 L
FVC-PRED: 3.06 L
GFR SERPL CREATININE-BSD FRML MDRD: 51 ML/MIN/1.73M2
GLUCOSE SERPL-MCNC: 132 MG/DL (ref 70–99)
HAPTOGLOB SERPL-MCNC: 337 MG/DL (ref 32–197)
HCT VFR BLD AUTO: 21.4 % (ref 35–47)
HGB BLD-MCNC: 6.3 G/DL (ref 11.7–15.7)
IMM GRANULOCYTES # BLD: 0.2 10E3/UL
IMM GRANULOCYTES NFR BLD: 2 %
LDH SERPL L TO P-CCNC: 287 U/L (ref 0–250)
LYMPHOCYTES # BLD AUTO: 0.2 10E3/UL (ref 0.8–5.3)
LYMPHOCYTES NFR BLD AUTO: 3 %
MAGNESIUM SERPL-MCNC: 2.5 MG/DL (ref 1.7–2.3)
MCH RBC QN AUTO: 29.9 PG (ref 26.5–33)
MCHC RBC AUTO-ENTMCNC: 29.4 G/DL (ref 31.5–36.5)
MCV RBC AUTO: 101 FL (ref 78–100)
MONOCYTES # BLD AUTO: 0.6 10E3/UL (ref 0–1.3)
MONOCYTES NFR BLD AUTO: 7 %
NEUTROPHILS # BLD AUTO: 7.9 10E3/UL (ref 1.6–8.3)
NEUTROPHILS NFR BLD AUTO: 87 %
NRBC # BLD AUTO: 0 10E3/UL
NRBC BLD AUTO-RTO: 0 /100
PATH REPORT.COMMENTS IMP SPEC: NORMAL
PATH REPORT.COMMENTS IMP SPEC: NORMAL
PATH REPORT.FINAL DX SPEC: NORMAL
PATH REPORT.MICROSCOPIC SPEC OTHER STN: NORMAL
PATH REPORT.MICROSCOPIC SPEC OTHER STN: NORMAL
PATH REPORT.RELEVANT HX SPEC: NORMAL
PLATELET # BLD AUTO: 235 10E3/UL (ref 150–450)
POTASSIUM SERPL-SCNC: 3 MMOL/L (ref 3.4–5.3)
RBC # BLD AUTO: 2.11 10E6/UL (ref 3.8–5.2)
RETICS # AUTO: 0.1 10E6/UL (ref 0.03–0.1)
RETICS/RBC NFR AUTO: 4.7 % (ref 0.5–2)
SODIUM SERPL-SCNC: 146 MMOL/L (ref 136–145)
TACROLIMUS BLD-MCNC: 5.5 UG/L (ref 5–15)
TME LAST DOSE: NORMAL H
TME LAST DOSE: NORMAL H
WBC # BLD AUTO: 9 10E3/UL (ref 4–11)

## 2022-09-01 PROCEDURE — G0463 HOSPITAL OUTPT CLINIC VISIT: HCPCS | Mod: 25

## 2022-09-01 PROCEDURE — 87799 DETECT AGENT NOS DNA QUANT: CPT | Performed by: INTERNAL MEDICINE

## 2022-09-01 PROCEDURE — 80197 ASSAY OF TACROLIMUS: CPT | Performed by: INTERNAL MEDICINE

## 2022-09-01 PROCEDURE — 94375 RESPIRATORY FLOW VOLUME LOOP: CPT | Performed by: INTERNAL MEDICINE

## 2022-09-01 PROCEDURE — 86832 HLA CLASS I HIGH DEFIN QUAL: CPT | Performed by: PHYSICIAN ASSISTANT

## 2022-09-01 PROCEDURE — 250N000013 HC RX MED GY IP 250 OP 250 PS 637: Performed by: INTERNAL MEDICINE

## 2022-09-01 PROCEDURE — 86901 BLOOD TYPING SEROLOGIC RH(D): CPT | Performed by: INTERNAL MEDICINE

## 2022-09-01 PROCEDURE — 36430 TRANSFUSION BLD/BLD COMPNT: CPT

## 2022-09-01 PROCEDURE — 96366 THER/PROPH/DIAG IV INF ADDON: CPT

## 2022-09-01 PROCEDURE — 85025 COMPLETE CBC W/AUTO DIFF WBC: CPT | Performed by: PATHOLOGY

## 2022-09-01 PROCEDURE — 85045 AUTOMATED RETICULOCYTE COUNT: CPT | Performed by: PATHOLOGY

## 2022-09-01 PROCEDURE — 86833 HLA CLASS II HIGH DEFIN QUAL: CPT | Performed by: PHYSICIAN ASSISTANT

## 2022-09-01 PROCEDURE — 71046 X-RAY EXAM CHEST 2 VIEWS: CPT | Mod: GC | Performed by: RADIOLOGY

## 2022-09-01 PROCEDURE — 80048 BASIC METABOLIC PNL TOTAL CA: CPT | Performed by: PATHOLOGY

## 2022-09-01 PROCEDURE — 83735 ASSAY OF MAGNESIUM: CPT | Performed by: PATHOLOGY

## 2022-09-01 PROCEDURE — 86922 COMPATIBILITY TEST ANTIGLOB: CPT | Performed by: PATHOLOGY

## 2022-09-01 PROCEDURE — 83010 ASSAY OF HAPTOGLOBIN QUANT: CPT | Performed by: INTERNAL MEDICINE

## 2022-09-01 PROCEDURE — 258N000003 HC RX IP 258 OP 636: Performed by: INTERNAL MEDICINE

## 2022-09-01 PROCEDURE — 99215 OFFICE O/P EST HI 40 MIN: CPT | Mod: 25 | Performed by: INTERNAL MEDICINE

## 2022-09-01 PROCEDURE — 36415 COLL VENOUS BLD VENIPUNCTURE: CPT | Performed by: PATHOLOGY

## 2022-09-01 PROCEDURE — 82746 ASSAY OF FOLIC ACID SERUM: CPT | Performed by: PHYSICIAN ASSISTANT

## 2022-09-01 PROCEDURE — P9016 RBC LEUKOCYTES REDUCED: HCPCS | Performed by: INTERNAL MEDICINE

## 2022-09-01 PROCEDURE — 250N000011 HC RX IP 250 OP 636: Performed by: INTERNAL MEDICINE

## 2022-09-01 PROCEDURE — 83615 LACTATE (LD) (LDH) ENZYME: CPT | Performed by: INTERNAL MEDICINE

## 2022-09-01 PROCEDURE — 96365 THER/PROPH/DIAG IV INF INIT: CPT

## 2022-09-01 RX ORDER — POTASSIUM CHLORIDE 7.45 MG/ML
10 INJECTION INTRAVENOUS 2 TIMES DAILY PRN
Status: CANCELLED
Start: 2022-09-01

## 2022-09-01 RX ORDER — POTASSIUM CHLORIDE 7.45 MG/ML
10 INJECTION INTRAVENOUS ONCE
Status: CANCELLED
Start: 2022-09-01 | End: 2022-09-01

## 2022-09-01 RX ORDER — LIDOCAINE 4 G/G
2 PATCH TOPICAL EVERY 24 HOURS
Qty: 30 PATCH | Refills: 3 | Status: SHIPPED | OUTPATIENT
Start: 2022-09-01 | End: 2022-09-08

## 2022-09-01 RX ORDER — LOPERAMIDE HYDROCHLORIDE 2 MG/1
2 TABLET ORAL 4 TIMES DAILY PRN
Qty: 90 TABLET | Refills: 3 | Status: ON HOLD | OUTPATIENT
Start: 2022-09-01 | End: 2022-10-08

## 2022-09-01 RX ORDER — POTASSIUM CHLORIDE 1.5 G/1.58G
40 POWDER, FOR SOLUTION ORAL ONCE
Status: CANCELLED
Start: 2022-09-01 | End: 2022-09-01

## 2022-09-01 RX ORDER — POTASSIUM CHLORIDE 7.45 MG/ML
10 INJECTION INTRAVENOUS ONCE
Status: DISCONTINUED | OUTPATIENT
Start: 2022-09-01 | End: 2022-09-01

## 2022-09-01 RX ORDER — FUROSEMIDE 20 MG
20 TABLET ORAL 2 TIMES DAILY
Qty: 30 TABLET | Refills: 1 | Status: SHIPPED | OUTPATIENT
Start: 2022-09-01 | End: 2022-09-08

## 2022-09-01 RX ORDER — POTASSIUM CHLORIDE 1.5 G/1.58G
40 POWDER, FOR SOLUTION ORAL ONCE
Status: COMPLETED | OUTPATIENT
Start: 2022-09-01 | End: 2022-09-01

## 2022-09-01 RX ADMIN — POTASSIUM CHLORIDE: 2 INJECTION, SOLUTION, CONCENTRATE INTRAVENOUS at 15:48

## 2022-09-01 RX ADMIN — POTASSIUM CHLORIDE 40 MEQ: 1.5 POWDER, FOR SOLUTION ORAL at 16:31

## 2022-09-01 RX ADMIN — POTASSIUM CHLORIDE: 2 INJECTION, SOLUTION, CONCENTRATE INTRAVENOUS at 16:50

## 2022-09-01 ASSESSMENT — PAIN SCALES - GENERAL: PAINLEVEL: MODERATE PAIN (4)

## 2022-09-01 NOTE — PROGRESS NOTES
[facility]  Sep 1, 2022           Assessment and Plan:   Sofie Rodriguez is a 62 year old female with a PMH significant for end-stage COPD, HTN, HFpEF, Mycobacterium peregrinum colonization, h/o hepatitis C and former methamphetamine use s/p BSLT on 6/28/22 (lungs slightly undersized) with persistent hypercapnia. Post-operative course complicated by bilateral pleural effusions, left radial artery thrombus (presumed secondary to arterial line) s/p thrombectomy 7/2, BACILIO, C diff, gastroparesis s/p GJ tube placement in IR 7/27, s/p EGD 8/3 with pyloric ulcer noted, melena with hgb drop (8/8), elevated LFTs (8/8) with extrahepatic mass on US and MRCP with increase in biliary dilation. S/p ERCP 8/11 with findings concerning for hemobilia, but no bleeding from ulcer in pyloric channel. She was seen in the ED last week for a clogged tube and again this past week. This is her third post discharge follow up.      S/p bilateral sequential lung transplant (BSLT) for end stage COPD:  Persistent hypercapneic respiratory failure:   Bilateral hydroPTX:   Right hemidiaphragm palsy: persistent hypercapnia without dyspnea. Sniff 7/14 notable for right hemidiaphragm palsy. NIPPV initially overnight for persistent hypercapnia, stopped 8/3 given lack of improvement with therapy, compensated hypercapnia persists. CTA abdomen 8/11 noted similar appearance of bilateral loculated moderate pleural effusions with adjacent compressive atelectasis and LL predominant interlobular septal thickening. s/p right thora 8/12 with 100 mL removed, on 8/29 s/p two thoras on the left side with barely 8mL and then 12mL removed.  CXR does not look significantly different from pre thora on the left and it caused some bleeding so at this point would just allow effusions to slowly resorb. Continues to use 0.5 - 1L O2. CMV 9/1/22 negative.PFTs are further down this week than last, wonder about some  mild fluid retention. No s/sx of new infectious etiology. Bicarb is downtrending a little and VBG CO2 has been stable. (not currently on NIPPV as she never really responded to it). Of note, lungs are slightly undersized for her.   - Sputum sample if able  - Attempt light diuresis 20 mg bid of lasix  - If PFTs decrease further after starting pulm rehab and attempting diuresis would repeat CT chest non con  - Overnight O2 study and 6 MWT in the future  - Needs to start pulmonary rehab (not home PT/OT)     Immunosuppression:  Induction therapy with basiliximab (and high dose IV steroid)  - Tacrolimus (via JT) Goal level 8-12  -  mg BID (increased 8/7, prior decrease for GI symptoms/leukopenia), unable to take myfortic because not taknig full pills by mouth  - Prednisone taper per below    Date AM dose (mg) PM dose (mg)   8/18/22 10 10   9/15/22 10 7.5   10/13/22 7.5 7.5   11/10/22 7.5 5   12/8/22 5 5   1/5/23 5 2.5     Prophylaxis  PJP: HOLD dapsone giving worsening anemia (Bactrim stopped for hyperkalemia), may need to consider pentamidine prophylaxis if anemia not remaining corrected by next visit  CMV D+/R+: VGCV for CMV ppx x 3 months (to end on 9/28)  EBV: D+/R+  Fungal: Nystatin for oral candidiasis ppx X 6 month course    Positive DSA: newly positive DSA on 8/10 with repeat on 8/15 increased from 2155 to 3584.  - DSA ordered for 8/29, pending     H/o M. peregrinum colonization: NGTD post-transplant.  - AFB to be sent on all future bronchs     EBV viremia: last level of 1501 (low 3.2) on 8/8  - EBV monthly due 9/8    Diarrhea: Having increase in loose stools, may correlate with uptitration of cellcept. Unable to trial switch to myfortic until gastroparesis improves.   - Started loperamide  - Low threshold to recheck C diff/enteric panel if having to use more than 2-3 times per day     C diff infection: positive 7/11. S/p PO vancomycin (7/11-7/28).  Repeat C diff negative 8/6.  Low threshold to  resume vancomycin in the future with ABX course. Having more loose stools again but no abdominal pain or leukocytosis or fevers.   - Low threshold to repeat C diff      Hypogammaglobulinemia: S/p IVIG dosing with premedication 7/30. IgG on 8/10 low but stable at 590.  - Repeat IgG  On 8/29 672 without indication for replacement     H/o HTN:  H/o HFpEF:  Had vasoplegia post operatively. Last echo 7/7/22 normal EF with good LV function. S/p hydrocortisone 7/6-7/9 and fludrocortisone 7/6-7/11 without significant improvement.   - Continue metoprolol 50 mg BID, may need to increase given her BPs     BACILIO:   Hyperkalemia: multifactorial including medications (Bactrim, tacrolimus) and hypotension. iHD 7/14-7/16. Potassium intermittently elevated since 8/8. Transitioned to low potassium TF formula. Stable today  - Tacrolimus monitoring as above  - Stop florinef in setting of significant hypokalemia     Elevated LFTs:   Extrahepatic and intrahepatic biliary dilation:acute rise in LFTs on 8/8 associated with increased and different abdominal pain as below. Abdominal US 8/8 with extrahepatic mass at level of common bile duct with associated intrahepatic and common bile duct dilation, patent hepatic vasculature, and layering gallbladder sludge. MRCP 8/9 with slight increase in moderate biliary dilation and gall bladder with moderate protein/hemorrhagic material. S/p ERCP 8/11 with findings concerning for hemobilia, stent placed across duodenum and in CBD. CTA abdomen 8/11 without evidence of acute GI bleed. GI team concerned bleeding originating from gallbladder. LFTs today WNL.   - Given acute decrease in hgb, may need to move up EGD scheduled on 10/13 or consider CT abdomen if not improving (currently set for 6 months for pancreatic findings ~2/2023)     IPMN: MRCP 8/9 showed cystic foci in the pancreatic head measuring 4 x 3 mm superiorly and 4 x 3 mm inferiorly.   - Follow up imaging in 6 months to 1 year    Severe  gastroparesis:   Pyloric ulcer: CT abdomen 7/15 with moderate to large gastric distention, otherwise without obstruction. Gastric emptying study 7/20 with severe gastric emptying delay (95% retention at 4 hours), s/p GJ tube placement in IR 7/27. S/p EGD 8/3 for coffee ground G tube output, noted to have pyloric ulcer and excessive gastric fluid and residual food. Hemoglobin drop with dark tarry stools 8/8.  S/p repeat EGD 8/11 with mild erythema 2/2 GJ tube trauma seen near insertion site but no active bleeding. Discussed diet today and recommend she go back to full liquid for comfort only for now.   - PPI BID  - Simethicone prn  - TF are continuous and ALL medications via J tube  - G tube to gravity drainage; full liquid diet for comfort only  - Repeat EGD 10/13 to check healing of ulcer, sooner if hgb declines further     Post op pain management: feels she still needs oxycodone, discussed weaning off over the next two weeks. Now using tylenol, but still struggling to get through a night without oxy due to pain. Only using 5 mg at bedtime prn at this point.   - Starting lidocaine patches   - Starting heat packs  - If still waking up in the middle of the night due to pain would be okay with another week of oxycodone  - Recommend Tylenol 1000 mg BID  - Gave last prescription for oxycodone 5 mg every 8 hours PRN     Acute on chronic anemia: last PRBC on 8/9/22. Thought secondary to chronic illness and BM suppression from meds. MCV elevated due to MMF. Today, hgb down to 7.9 from 9/1 on 8/16. Ferritin elevated, iron and sat index WNL. B12 WNL. Retic % slightly elevated at 2.6, absolute RC WNL, haptoglobin elevated without evidence of active hemolysis and peripheral smear without polychromasia and macrocytosis which was known, no schistos. Unlikely to have been dapsone. Dropped further to 6.3 today after hematoma formation and bleeding noted during left thoracentesis.  - 1U [facility]BCs given today at [facility], may give another  unit this week   - Anemia clinic referral  - If continues to decline by next appt repeat CTA of abdomen given concern for gallbladder bleeding  - FOBT pending     Stress-induced/steroid induced hyperglycemia with intermittent hypoglycemia: most BS have been < 200. Unclear why she is doing every 4 hour BS checks at home as this is not sustainable overnight with her caretaker.   - Will have Peter follow up  - Continue Lantus and sliding scale insulin    Issues Not Addressed:        A flutter with RVR/SVT: SVT first noted on 7/14, prior to HD line placement, continues intermittently.  Aflutter with RVR to 200 7/17 triggered by activity.  EP consulted 7/17 given persistent tachycardia/dysrhythmia, midodrine discontinued. AC deferred per EP given bleeding risk and despite CHADSVASc of 2. Zio patch fell off, will be replaced.  - Continue metoprolol    Left hand ischemia: radial artery thrombosis identified on duplex doppler s/p thrombectomy on 7/2. Repeat LUE arterial US 8/15/22 with dopplerable flow. No longer on AC.     H/o hepatitis C: diagnosed in 1980s s/p 2m treatment, last positive 2/20/17 ([phone]).  Ab positive on 6/2021 with negative HCV PCR.  H/o remote EtOH abuse.  MR elastography (4/27/21) with hepatology review and consult without any concerns post transplant. Hepatitis C RNA negative and hepatitis C antibody positive (old) on 6/28.  Repeat hepatitis C RNA negative 8/8 in setting of elevated LFTs.      RTC: next week as scheduled  Influenza and other vaccinations: received 4 doses of covid vaccine pre transplant; Evusheld 8/24/22    Major Changes:  - Heat packs  - Imodium  - Take antiemetic 30 minutes prior to taking morning meds  - Lidocaine patches  - Furosemide 20 mg bid  - Trial off oxygen during the day  - Stop florinef  - Give 20 mEQ IV KCL and 40 mEq Klor today  - FOBT pending  - 1U PRBCs transfused today and 1U later this week, if no stabilization in Hgb will likely need to do CTA or EGD        I  "personally spent 60 minutes in documentation, the interview and exam, and review of the chart/labs/imaging on Sep 1, 2022 not including time spent interpreting spirometry.     Claribel Franks MD  [facility]   Pulmonary Transplant   Post Transplant Coordinator: Nadya Moira  Fax: [phone]  Ph: [phone]            Interval History:   9/1/22  Feels well when she walks down the reilly, but feels like she's breathing hard.   3-4 times a day of diarrhea, no solid stools, sometimes waking her up. Not sleeping well due to incisional pain which is occasional quite sharp. She is using tylenol 1000 mg BID and only sometimes using the 5 mg of oxycodone at bedtime to help her get through the night. She reports significant queasiness and nausea in the morning after having all of her morning meds flushed through her feeding tube. Has not been using her antiemetic at all. On 0.5L at rest, but thinks she may not need it. Denies worsening of her cough or any significant sputum congestion. Is having significant tightness and some sharp pain associated with her incision since cutting back on the oxy and this is actually disrupting her sleep. Is having stomach tenderness around GJ site since the GJ was replaced again yesterday. Still doing continuous TFs.     8/24/22  Since her last visit, patient is doing okay. On 1 L O2 most of the time except when she is walking, but does drop down into the upper 80s. Needs to do PT/OT at home before insurance will cover pulmonary rehab. No new shortness of breath, does have a cough at night per her daughter, intermittently productive and more with her breathing exercises. No congestion or tightness. Notes she is still using oxycodone twice per day, requesting more. No nausea, had one episode of a \"zap\" around her feeding tube, but that has resolved. No vomiting, stools are formed as of yesterday. Doing continuous tube feeds, flushes are    "        Review of Systems:   Please see HPI, otherwise the complete 10 point ROS is negative.           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     Hepatitis 2017    Hep C, [facility]     HTN (hypertension)      Osteopenia      Past Surgical History:   Procedure Laterality Date     BRONCHOSCOPY (RIGID OR FLEXIBLE), DIAGNOSTIC N/A 8/2/2022    Procedure: BRONCHOSCOPY, DIAGNOSTIC- inspection Bronch;  Surgeon: Kamala Lovell MD;  Location:  GI     BRONCHOSCOPY FLEXIBLE AND RIGID N/A 07/19/2022    Procedure: BRONCHOSCOPY inspection only;  Surgeon: Bob Liao MD;  Location:  GI     COLONOSCOPY  2015     CV CORONARY ANGIOGRAM N/A 06/30/2021    Procedure: CV CORONARY ANGIOGRAM;  Surgeon: Alexander Cuellar MD;  Location:  HEART CARDIAC CATH LAB     CV RIGHT HEART CATH MEASUREMENTS RECORDED N/A 06/30/2021    Procedure: CV RIGHT HEART CATH;  Surgeon: Alexander Cuellar MD;  Location:  HEART CARDIAC CATH LAB     ENDOSCOPIC RETROGRADE CHOLANGIOPANCREATOGRAM N/A 8/11/2022    Procedure: ENDOSCOPIC RETROGRADE CHOLANGIOPANCREATOGRAPHY WITH PANCREATIC DUCT NEEDLE KNIFE AND STENT PLACEMENT, BILE DUCT SPHINCTEROTOMY, BLOOD/DEBRIS REMOVAL AND STENT PLACEMENT;  Surgeon: Cosmo Arroyo MD;  Location:  OR     ENT SURGERY  1974    tonsillectomy     ENTEROSCOPY SMALL BOWEL N/A 8/11/2022    Procedure: SMALL BOWEL ENTEROSCOPY;  Surgeon: Cosmo Arroyo MD;  Location:  OR     ESOPHAGOGASTRODUODENOSCOPY, WITH NASOGASTRIC TUBE INSERTION N/A 07/01/2022    Procedure: ESOPHAGOGASTRODUODENOSCOPY, WITH NASOJEJUNAL TUBE INSERTION;  Surgeon: Ozzy Nickerson MD;  Location:  GI     ESOPHAGOSCOPY, GASTROSCOPY, DUODENOSCOPY (EGD), COMBINED N/A 8/3/2022    Procedure: ESOPHAGOGASTRODUODENOSCOPY (EGD);   Surgeon: Ira Andres MD;  Location: [facility] GI     HAND SURGERY       IR GASTRO JEJUNOSTOMY TUBE PLACEMENT  2022     IR THORACENTESIS  2022     LEEP TX, CERVICAL  2017    HECTOR III     LYMPH NODE BIOPSY Left     Left axilla, benign- [place]     MIDLINE INSERTION - DOUBLE LUMEN Left 2022    20cm, Basilic vein     THORACENTESIS Left 2022    Procedure: THORACENTESIS;  Surgeon: Bo Capone PA-C;  Location: [facility] OR     THROMBECTOMY UPPER EXTREMITY Left 2022    Procedure: LEFT RADIAL ARM THROMBECTOMY;  Surgeon: Christie Graham MD;  [facility]     TRANSPLANT LUNG RECIPIENT SINGLE X2 Bilateral 2022    Procedure: Clamshell Incision, Bilateral Sequential Lung Transplant, On Cardiopulmonary Bypass, Flexible Bronchoscopy;  Surgeon: Sue Sunshine MD;  [facility]: [facility] OR           Family History:     No family history on file.         Social History:     Social History     Socioeconomic History     Marital status: Single     Spouse name: Not on file     Number of children: Not on file     Years of education: Not on file     Highest education level: Not on file   Occupational History     Not on file   Tobacco Use     Smoking status: Former Smoker     Years: 30.00     Types: Cigarettes     Quit date: 2020     Years since quittin.8     Smokeless tobacco: Never Used   Substance and Sexual Activity     Alcohol use: Not Currently     Drug use: Not Currently     Types: Marijuana, Methamphetamines     Comment: hx:marijuana and methamphetamine-quit both unsure ?  2-3 yrs ago     Sexual activity: Not on file   Other Topics Concern     Parent/sibling w/ CABG, MI or angioplasty before 65F 55M? Not Asked   Social History Narrative     Not on file     Social Determinants of Health     Financial Resource Strain: Not on file   Food Insecurity: Not on file   Transportation Needs: Not on file   Physical Activity: Not on file   Stress: Not on file   Social  "Connections: Not on file   Intimate Partner Violence: Not on file   Housing Stability: Not on file            Medications:     Current Outpatient Medications   Medication     acetaminophen (TYLENOL) 160 MG/5ML liquid     alcohol swab prep pads     aspirin (ASA) 81 MG EC tablet     blood glucose (CONTOUR NEXT TEST) test strip     blood glucose (NO BRAND SPECIFIED) lancets standard     blood glucose monitoring (CONTOUR NEXT MONITOR W/DEVICE KIT) meter device kit     calcium carbonate 600 mg-vitamin D 400 units (CALTRATE) 600-400 MG-UNIT per tablet     CELLCEPT (BRAND) 200 MG/ML suspension     fludrocortisone (FLORINEF) 0.1 MG tablet     insulin aspart (NOVOLOG PEN) 100 UNIT/ML pen     insulin glargine (LANTUS PEN) 100 UNIT/ML pen     insulin pen needle (31G X 5 MM) 31G X 5 MM miscellaneous     metoprolol tartrate (LOPRESSOR) 50 MG tablet     Multiple Vitamins-Minerals (MULTIVITAMINS W/MINERALS) liquid     mycophenolate (GENERIC EQUIVALENT) 200 MG/ML suspension     Nutritional Supplements (GLUCOSE MANAGEMENT) TABS     nystatin (MYCOSTATIN) 517124 UNIT/ML suspension     ondansetron (ZOFRAN ODT) 4 MG ODT tab     oxyCODONE (ROXICODONE) 5 MG tablet     pantoprazole (PROTONIX) 2 mg/mL SUSP suspension     predniSONE (DELTASONE) 5 MG tablet     protein modular (PROSOURCE TF) LIQD     [employer]arps Container MISC     simethicone (MYLICON) 40 MG/0.6ML suspension     tacrolimus (GENERIC EQUIVALENT) 1 mg/mL suspension     valGANciclovir (VALCYTE) 50 MG/ML solution     No current facility-administered medications for this visit.            Physical Exam:   BP (!) 146/82 (BP Location: Left arm, Patient Position: Sitting, Cuff Size: Adult Regular)   Pulse 96   Temp 98.1  F (36.7  C) (Oral)   Resp 28   Ht 1.6 m (5' 3\")   Wt 70.3 kg (155 lb)   SpO2 97%   BMI 27.46 kg/m   On 0.5L NC    GENERAL: alert, NAD  HEENT: NCAT, EOMI, no scleral icterus, oral mucosa moist and without lesions  Neck: no cervical or supraclavicular " adenopathy  Lungs: moderate airflow, decreased in bases bilaterally  CV: RRR, S1S2, no murmurs noted  Abdomen: normoactive BS, soft, tenderness to palpation near GJ exchange site  Lymph: +1 pedal edema b/l  Neuro: AAO X 3, CN 2-12 grossly intact  Psychiatric: normal affect, good eye contact  Skin: no rash, jaundice or lesions on limited exam         Data:   All laboratory and imaging data reviewed.      Recent Results (from the past 168 hour(s))   Respiratory Aerobic Bacterial Culture with Gram Stain    Collection Time: 08/26/22  7:45 AM    Specimen: Expectorate; Sputum   Result Value Ref Range    Culture       >10 Squamous epithelial cells/low power field indicates oral contamination. Please recollect.    Gram Stain Result >10 Squamous epithelial cells/low power field     Gram Stain Result >25 PMNs/low power field     Gram Stain Result 4+ Mixed josue    Basic metabolic panel    Collection Time: 08/29/22 10:39 AM   Result Value Ref Range    Sodium 141 133 - 144 mmol/L    Potassium 3.6 3.4 - 5.3 mmol/L    Chloride 97 94 - 109 mmol/L    Carbon Dioxide (CO2) 44 (H) 20 - 32 mmol/L    Anion Gap <1 (L) 3 - 14 mmol/L    Urea Nitrogen 54 (H) 7 - 30 mg/dL    Creatinine 1.24 (H) 0.52 - 1.04 mg/dL    Calcium 8.7 8.5 - 10.1 mg/dL    Glucose 104 (H) 70 - 99 mg/dL    GFR Estimate 50 (L) >60 mL/min/1.73m2   Magnesium    Collection Time: 08/29/22 10:39 AM   Result Value Ref Range    Magnesium 2.8 (H) 1.6 - 2.3 mg/dL   CBC with platelets    Collection Time: 08/29/22 10:39 AM   Result Value Ref Range    WBC Count 8.8 4.0 - 11.0 10e3/uL    RBC Count 2.35 (L) 3.80 - 5.20 10e6/uL    Hemoglobin 7.1 (L) 11.7 - 15.7 g/dL    Hematocrit 23.7 (L) 35.0 - 47.0 %     (H) 78 - 100 fL    MCH 30.2 26.5 - 33.0 pg    MCHC 30.0 (L) 31.5 - 36.5 g/dL    RDW 17.3 (H) 10.0 - 15.0 %    Platelet Count 283 150 - 450 10e3/uL   CMV DNA quantification    Collection Time: 08/29/22 10:39 AM    Specimen: Arm, Left; Blood   Result Value Ref Range    CMV DNA  IU/mL Not Detected Not Detected IU/mL   Tacrolimus level    Collection Time: 08/29/22 10:39 AM   Result Value Ref Range    Tacrolimus by Tandem Mass Spectrometry 14.5 5.0 - 15.0 ug/L    Tacrolimus Last Dose Date 8/29/2022     Tacrolimus Last Dose Time  8:15 AM    Blood gas venous    Collection Time: 08/29/22 10:39 AM   Result Value Ref Range    pH Venous 7.42 7.32 - 7.43    pCO2 Venous 71 (H) 40 - 50 mm Hg    pO2 Venous 24 (L) 25 - 47 mm Hg    Bicarbonate Venous 46 (HH) 21 - 28 mmol/L    Base Excess/Deficit (+/-) 19.1 (H) -7.7 - 1.9 mmol/L    FIO2 21    IgG    Collection Time: 08/29/22 10:39 AM   Result Value Ref Range    Immunoglobulin G 672 610 - 1,616 mg/dL   Glucose fluid    Collection Time: 08/29/22 12:19 PM   Result Value Ref Range    Glucose Fluid Source Pleural Cavity, Left     Glucose fluid 103 mg/dL   pH fluid    Collection Time: 08/29/22 12:19 PM   Result Value Ref Range    pH Fluid Source Pleural Cavity, Left     pH Fluid 7.7 pH   Protein fluid    Collection Time: 08/29/22 12:19 PM   Result Value Ref Range    Protein Fluid Source Pleural Cavity, Left     Protein Total Fluid 1.6 g/dL   Lactate dehydrogenase fluid    Collection Time: 08/29/22 12:19 PM   Result Value Ref Range    LD Fluid Source Pleural Cavity, Left     Lactate dehydrogenase fluid 131 U/L   Pleural fluid Aerobic Bacterial Culture Routine with Gram Stain    Collection Time: 08/29/22 12:19 PM    Specimen: Pleural Cavity, Left; Pleural fluid   Result Value Ref Range    Culture No growth after 1 day     Gram Stain Result No organisms seen     Gram Stain Result No white blood cells seen    Cell Count Body Fluid    Collection Time: 08/29/22 12:19 PM   Result Value Ref Range    Color Orange (A) Colorless, Yellow    Clarity Hazy (A) Clear, Bloody    Total Nucleated Cells 203 /uL    Cell Count Fluid Source Pleural Cavity, Left    Differential Body Fluid    Collection Time: 08/29/22 12:19 PM   Result Value Ref Range    % Neutrophils 3 %    %  Lymphocytes 92 %    % Monocyte/Macrophages 6 %   Glucose by meter    Collection Time: 08/31/22 10:06 AM   Result Value Ref Range    GLUCOSE BY METER POCT 68 (L) 70 - 99 mg/dL   Comprehensive metabolic panel    Collection Time: 08/31/22 10:35 AM   Result Value Ref Range    Sodium 145 136 - 145 mmol/L    Potassium 3.3 (L) 3.4 - 5.3 mmol/L    Creatinine 1.30 (H) 0.51 - 0.95 mg/dL    Urea Nitrogen 53.8 (H) 8.0 - 23.0 mg/dL    Chloride 97 (L) 98 - 107 mmol/L    Carbon Dioxide (CO2) 41 (H) 22 - 29 mmol/L    Anion Gap 7 7 - 15 mmol/L    Glucose 65 (L) 70 - 99 mg/dL    Calcium 9.0 8.8 - 10.2 mg/dL    Protein Total 6.0 (L) 6.4 - 8.3 g/dL    Albumin 3.3 (L) 3.5 - 5.2 g/dL    Bilirubin Total 0.2 <=1.2 mg/dL    Alkaline Phosphatase 102 35 - 104 U/L    AST 20 10 - 35 U/L    ALT 13 10 - 35 U/L    GFR Estimate 47 (L) >60 mL/min/1.73m2   Extra Blue Top Tube    Collection Time: 08/31/22 10:37 AM   Result Value Ref Range    Hold Specimen JIC    Extra Red Top Tube    Collection Time: 08/31/22 10:37 AM   Result Value Ref Range    Hold Specimen JIC    Extra Green Top (Lithium Heparin) Tube    Collection Time: 08/31/22 10:37 AM   Result Value Ref Range    Hold Specimen JIC    Extra Purple Top Tube    Collection Time: 08/31/22 10:37 AM   Result Value Ref Range    Hold Specimen JIC    Glucose by meter    Collection Time: 08/31/22 11:10 AM   Result Value Ref Range    GLUCOSE BY METER POCT 139 (H) 70 - 99 mg/dL   Glucose by meter    Collection Time: 08/31/22  1:20 PM   Result Value Ref Range    GLUCOSE BY METER POCT 55 (L) 70 - 99 mg/dL   Glucose by meter    Collection Time: 08/31/22  2:59 PM   Result Value Ref Range    GLUCOSE BY METER POCT 101 (H) 70 - 99 mg/dL   Glucose by meter    Collection Time: 08/31/22  4:29 PM   Result Value Ref Range    GLUCOSE BY METER POCT 121 (H) 70 - 99 mg/dL       Date Place TLC (%) FVC (%) FEV1 (%) FEV1/FVC DLCO (%) Note   9/1/22    1.07 35 1.02 42 95                                                    PFT  interpretation:  Maneuver: valid, but did not meet ATS guidelines  Sep 1, 2022  PFT Interpretation:  Severe restrictive ventilatory defect.  Decreased from previous.  Below recent best. Still establishing baseline  Valid Maneuver

## 2022-09-01 NOTE — PROGRESS NOTES
[facility] unit of PRBC and 20 mEq of K+ IV.  Pt not able to get 2nd unit of blood as new therapy plan was needed.

## 2022-09-01 NOTE — LETTER
9/1/2022         RE: Sofie Rodriguez  [address] [place] [address]        Dear Colleague,    Thank you for referring your patient, Sofie Rodriguez, to the [facility]ANSPL[facility]. Please see a copy of my visit note below.    Transplant Coordinator Note    Reason for visit: Post lung transplant follow up visit   Coordinator: Present   Caregiver:  Daughter    Health concerns addressed today:  1. Respiratory - shortness of breath with activity.   2. GI: nausea/diarrhea  3.  Difficulty sleeping d/t pain    Activity/rehab: pulmonary rehab eval done. Start rehab Friday, 9/2  Oxygen needs: 0.5-1L O2  Pain management/RX: Incisional pain- feels tight. Sharp pains at times. Use your tylenol as needed.   Diabetic management: on insulin/lantus  Next Bronch due: POD 60  Risk Criteria Labs: negative 7/28/22  CMV status: D+/R+  Valcyte stopped: POD 8-90  EBV status: D+/R+  AC/asa:  On ASA 81mg  PJP prophylactic: Dapsone     COVID:  1. COVID-19 infection (yes/no, date of most recent positive test):   2. Status/instructions given about COVID-19 vaccine: Vaccinated, booster x2 last 3/21/22. Received Evusheld 8/24/2022. Next due 2/24/2023    Pt Education: medications (use/dose/side effects), how/when to call coordinator, frequency of labs, s/s of infection/rejection, call prior to starting any new medications, lab/vital sign book    Health Maintenance:     Last colonoscopy:     Next colonoscopy due:     Dermatology:    Vaccinations this visit:     Labs, CXR, PFTs reviewed with patient  Medication record reviewed and reconciled  Questions and concerns addressed    Patient Instructions  1. Continue to hydrate with 60-70 oz fluids daily.  2. Continue to exercise daily or most days of the week.  3. It doesn't seem like the COVID vaccine is working well in lung transplant patients. A number of lung transplant patients have gotten sick with COVID even after receiving the vaccines.  Based on our recent experience, it  can be life-threatening to get COVID  even after being vaccinated. Please continue to act like you did not get the COVID vaccine - social distancing, wearing a mask, good hand hygiene, etc. If the people around you are vaccinated, it will help reduce the risk of you getting COVID. All members of your household should be vaccinated.  4. We are going to have you take Lasix 20mg twice a day (8AM and 1pm) for a week  5. Take Zofran 30 minutes before your morning meds.   6. Take tylenol, a heat pack, and lidocaine patches at bedtime. If that doesn't help, then take an oxycodone 5mg at bedtime as well.   7. Please please please call the transplant office if you have any questions!    Next transplant clinic appointment: 9/8 with CXR, labs and PFTs before appointment with Kaylin Triana  Next lab draw: weekly      AVS printed at time of check out          [facility]  Sep 1, 2022           Assessment and Plan:   Sofie Rodriguez is a 62 year old female with a PMH significant for end-stage COPD, HTN, HFpEF, Mycobacterium peregrinum colonization, h/o hepatitis C and former methamphetamine use s/p BSLT on 6/28/22 (lungs slightly undersized) with persistent hypercapnia. Post-operative course complicated by bilateral pleural effusions, left radial artery thrombus (presumed secondary to arterial line) s/p thrombectomy 7/2, BACILIO, C diff, gastroparesis s/p GJ tube placement in IR 7/27, s/p EGD 8/3 with pyloric ulcer noted, melena with hgb drop (8/8), elevated LFTs (8/8) with extrahepatic mass on US and MRCP with increase in biliary dilation. S/p ERCP 8/11 with findings concerning for hemobilia, but no bleeding from ulcer in pyloric channel. She was seen in the ED last week for a clogged tube and again this past week. This is her third post discharge follow up.      S/p bilateral sequential lung transplant (BSLT) for end stage COPD:  Persistent hypercapneic respiratory  failure:   Bilateral hydroPTX:   Right hemidiaphragm palsy: persistent hypercapnia without dyspnea. Sniff 7/14 notable for right hemidiaphragm palsy. NIPPV initially overnight for persistent hypercapnia, stopped 8/3 given lack of improvement with therapy, compensated hypercapnia persists. CTA abdomen 8/11 noted similar appearance of bilateral loculated moderate pleural effusions with adjacent compressive atelectasis and LL predominant interlobular septal thickening. s/p right thora 8/12 with 100 mL removed, on 8/29 s/p two thoras on the left side with barely 8mL and then 12mL removed.  CXR does not look significantly different from pre thora on the left and it caused some bleeding so at this point would just allow effusions to slowly resorb. Continues to use 0.5 - 1L O2. CMV 9/1/22 negative.PFTs are further down this week than last, wonder about some mild fluid retention. No s/sx of new infectious etiology. Bicarb is downtrending a little and VBG CO2 has been stable. (not currently on NIPPV as she never really responded to it). Of note, lungs are slightly undersized for her.   - Sputum sample if able  - Attempt light diuresis 20 mg bid of lasix  - If PFTs decrease further after starting pulm rehab and attempting diuresis would repeat CT chest non con  - Overnight O2 study and 6 MWT in the future  - Needs to start pulmonary rehab (not home PT/OT)     Immunosuppression:  Induction therapy with basiliximab (and high dose IV steroid)  - Tacrolimus (via JT) Goal level 8-12  -  mg BID (increased 8/7, prior decrease for GI symptoms/leukopenia), unable to take myfortic because not taknig full pills by mouth  - Prednisone taper per below    Date AM dose (mg) PM dose (mg)   8/18/22 10 10   9/15/22 10 7.5   10/13/22 7.5 7.5   11/10/22 7.5 5   12/8/22 5 5   1/5/23 5 2.5     Prophylaxis  PJP: HOLD dapsone giving worsening anemia (Bactrim stopped for hyperkalemia), may need to consider pentamidine prophylaxis if anemia not  remaining corrected by next visit  CMV D+/R+: VGCV for CMV ppx x 3 months (to end on 9/28)  EBV: D+/R+  Fungal: Nystatin for oral candidiasis ppx X 6 month course    Positive DSA: newly positive DSA on 8/10 with repeat on 8/15 increased from 2155 to 3584.  - DSA ordered for 8/29, pending     H/o M. peregrinum colonization: NGTD post-transplant.  - AFB to be sent on all future bronchs     EBV viremia: last level of 1501 (low 3.2) on 8/8  - EBV monthly due 9/8    Diarrhea: Having increase in loose stools, may correlate with uptitration of cellcept. Unable to trial switch to myfortic until gastroparesis improves.   - Started loperamide  - Low threshold to recheck C diff/enteric panel if having to use more than 2-3 times per day     C diff infection: positive 7/11. S/p PO vancomycin (7/11-7/28).  Repeat C diff negative 8/6.  Low threshold to resume vancomycin in the future with ABX course. Having more loose stools again but no abdominal pain or leukocytosis or fevers.   - Low threshold to repeat C diff      Hypogammaglobulinemia: S/p IVIG dosing with premedication 7/30. IgG on 8/10 low but stable at 590.  - Repeat IgG  On 8/29 672 without indication for replacement     H/o HTN:  H/o HFpEF:  Had vasoplegia post operatively. Last echo 7/7/22 normal EF with good LV function. S/p hydrocortisone 7/6-7/9 and fludrocortisone 7/6-7/11 without significant improvement.   - Continue metoprolol 50 mg BID, may need to increase given her BPs     BACILIO:   Hyperkalemia: multifactorial including medications (Bactrim, tacrolimus) and hypotension. iHD 7/14-7/16. Potassium intermittently elevated since 8/8. Transitioned to low potassium TF formula. Stable today  - Tacrolimus monitoring as above  - Stop florinef in setting of significant hypokalemia     Elevated LFTs:   Extrahepatic and intrahepatic biliary dilation:acute rise in LFTs on 8/8 associated with increased and different abdominal pain as below. Abdominal US 8/8 with extrahepatic  mass at level of common bile duct with associated intrahepatic and common bile duct dilation, patent hepatic vasculature, and layering gallbladder sludge. MRCP 8/9 with slight increase in moderate biliary dilation and gall bladder with moderate protein/hemorrhagic material. S/p ERCP 8/11 with findings concerning for hemobilia, stent placed across duodenum and in CBD. CTA abdomen 8/11 without evidence of acute GI bleed. GI team concerned bleeding originating from gallbladder. LFTs today WNL.   - Given acute decrease in hgb, may need to move up EGD scheduled on 10/13 or consider CT abdomen if not improving (currently set for 6 months for pancreatic findings ~2/2023)     IPMN: MRCP 8/9 showed cystic foci in the pancreatic head measuring 4 x 3 mm superiorly and 4 x 3 mm inferiorly.   - Follow up imaging in 6 months to 1 year    Severe gastroparesis:   Pyloric ulcer: CT abdomen 7/15 with moderate to large gastric distention, otherwise without obstruction. Gastric emptying study 7/20 with severe gastric emptying delay (95% retention at 4 hours), s/p GJ tube placement in IR 7/27. S/p EGD 8/3 for coffee ground G tube output, noted to have pyloric ulcer and excessive gastric fluid and residual food. Hemoglobin drop with dark tarry stools 8/8.  S/p repeat EGD 8/11 with mild erythema 2/2 GJ tube trauma seen near insertion site but no active bleeding. Discussed diet today and recommend she go back to full liquid for comfort only for now.   - PPI BID  - Simethicone prn  - TF are continuous and ALL medications via J tube  - G tube to gravity drainage; full liquid diet for comfort only  - Repeat EGD 10/13 to check healing of ulcer, sooner if hgb declines further     Post op pain management: feels she still needs oxycodone, discussed weaning off over the next two weeks. Now using tylenol, but still struggling to get through a night without oxy due to pain. Only using 5 mg at bedtime prn at this point.   - Starting lidocaine patches    - Starting heat packs  - If still waking up in the middle of the night due to pain would be okay with another week of oxycodone  - Recommend Tylenol 1000 mg BID  - Gave last prescription for oxycodone 5 mg every 8 hours PRN     Acute on chronic anemia: last PRBC on 8/9/22. Thought secondary to chronic illness and BM suppression from meds. MCV elevated due to MMF. Today, hgb down to 7.9 from 9/1 on 8/16. Ferritin elevated, iron and sat index WNL. B12 WNL. Retic % slightly elevated at 2.6, absolute RC WNL, haptoglobin elevated without evidence of active hemolysis and peripheral smear without polychromasia and macrocytosis which was known, no schistos. Unlikely to have been dapsone. Dropped further to 6.3 today after hematoma formation and bleeding noted during left thoracentesis.  - [facility]U PRBCs given [facility] at [facility], may give another unit this week   - Anemia clinic referral  - If continues to decline by next appt repeat CTA of abdomen given concern for gallbladder bleeding  - FOBT pending     Stress-induced/steroid induced hyperglycemia with intermittent hypoglycemia: most BS have been < 200. Unclear why she is doing every 4 hour BS checks at home as this is not sustainable overnight with her caretaker.   - Will have Peter follow up  - Continue Lantus and sliding scale insulin    Issues Not Addressed:        A flutter with RVR/SVT: SVT first noted on 7/14, prior to HD line placement, continues intermittently.  Aflutter with RVR to 200 7/17 triggered by activity.  EP consulted 7/17 given persistent tachycardia/dysrhythmia, midodrine discontinued. AC deferred per EP given bleeding risk and despite CHADSVASc of 2. Zio patch fell off, will be replaced.  - Continue metoprolol    Left hand ischemia: radial artery thrombosis identified on duplex doppler s/p thrombectomy on 7/2. Repeat LUE arterial US 8/15/22 with dopplerable flow. No longer on AC.     H/o hepatitis C: diagnosed in 1980s s/p 2m treatment, last positive 2/20/17  ([phone]).  Ab positive on 6/2021 with negative HCV PCR.  H/o remote EtOH abuse.  MR elastography (4/27/21) with hepatology review and consult without any concerns post transplant. Hepatitis C RNA negative and hepatitis C antibody positive (old) on 6/28.  Repeat hepatitis C RNA negative 8/8 in setting of elevated LFTs.      RTC: next week as scheduled  Influenza and other vaccinations: received 4 doses of covid vaccine pre transplant; Evusheld 8/24/22    Major Changes:  - Heat packs  - Imodium  - Take antiemetic 30 minutes prior to taking morning meds  - Lidocaine patches  - Furosemide 20 mg bid  - Trial off oxygen during the day  - Stop florinef  - Give 20 mEQ IV KCL and 40 mEq Klor today  - FOBT pending  - 1U PRBCs transfused today and 1U later this week, if no stabilization in Hgb will likely need to do CTA or EGD        I personally spent 60 minutes in documentation, the interview and exam, and review of the chart/labs/imaging on Sep 1, 2022 not including time spent interpreting spirometry.     Claribel Franks MD  [facility]   Pulmonary Transplant   Post Transplant Coordinator: Nadya Pizarro  Fax: [phone]  Ph: [phone]            Interval History:   9/1/22  Feels well when she walks down the reilly, but feels like she's breathing hard.   3-4 times a day of diarrhea, no solid stools, sometimes waking her up. Not sleeping well due to incisional pain which is occasional quite sharp. She is using tylenol 1000 mg BID and only sometimes using the 5 mg of oxycodone at bedtime to help her get through the night. She reports significant queasiness and nausea in the morning after having all of her morning meds flushed through her feeding tube. Has not been using her antiemetic at all. On 0.5L at rest, but thinks she may not need it. Denies worsening of her cough or any significant sputum congestion. Is having significant tightness and some sharp pain associated  "with her incision since cutting back on the oxy and this is actually disrupting her sleep. Is having stomach tenderness around GJ site since the GJ was replaced again yesterday. Still doing continuous TFs.     8/24/22  Since her last visit, patient is doing okay. On 1 L O2 most of the time except when she is walking, but does drop down into the upper 80s. Needs to do PT/OT at home before insurance will cover pulmonary rehab. No new shortness of breath, does have a cough at night per her daughter, intermittently productive and more with her breathing exercises. No congestion or tightness. Notes she is still using oxycodone twice per day, requesting more. No nausea, had one episode of a \"zap\" around her feeding tube, but that has resolved. No vomiting, stools are formed as of yesterday. Doing continuous tube feeds, flushes are           Review of Systems:   Please see HPI, otherwise the complete 10 point ROS is negative.           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     Hepatitis 2017    Hep C, [facility]     HTN (hypertension)      Osteopenia      Past Surgical History:   Procedure Laterality Date     BRONCHOSCOPY (RIGID OR FLEXIBLE), DIAGNOSTIC N/A 8/2/2022    Procedure: BRONCHOSCOPY, DIAGNOSTIC- inspection Bronch;  Surgeon: Kamala Lovell MD;  Location:  GI     BRONCHOSCOPY FLEXIBLE AND RIGID N/A 07/19/2022    Procedure: BRONCHOSCOPY inspection only;  Surgeon: Bob Liao MD;  Location:  GI     COLONOSCOPY  2015     CV CORONARY ANGIOGRAM N/A 06/30/2021    Procedure: CV CORONARY ANGIOGRAM;  Surgeon: Alexander Cuellar MD;  Location:  HEART CARDIAC CATH LAB     CV RIGHT HEART CATH MEASUREMENTS RECORDED N/A 06/30/2021    Procedure: CV RIGHT HEART CATH;  Surgeon: Alexander Cuellar MD;  " Location:  HEART CARDIAC CATH LAB     ENDOSCOPIC RETROGRADE CHOLANGIOPANCREATOGRAM N/A 8/11/2022    Procedure: ENDOSCOPIC RETROGRADE CHOLANGIOPANCREATOGRAPHY WITH PANCREATIC DUCT NEEDLE KNIFE AND STENT PLACEMENT, BILE DUCT SPHINCTEROTOMY, BLOOD/DEBRIS REMOVAL AND STENT PLACEMENT;  Surgeon: Cosmo Arroyo MD;  Location:  OR     ENT SURGERY  1974    tonsillectomy     ENTEROSCOPY SMALL BOWEL N/A 8/11/2022    Procedure: SMALL BOWEL ENTEROSCOPY;  Surgeon: Cosmo Arroyo MD;  Location:  OR     ESOPHAGOGASTRODUODENOSCOPY, WITH NASOGASTRIC TUBE INSERTION N/A 07/01/2022    Procedure: ESOPHAGOGASTRODUODENOSCOPY, WITH NASOJEJUNAL TUBE INSERTION;  Surgeon: Ozzy Nickerson MD;  Location:  GI     ESOPHAGOSCOPY, GASTROSCOPY, DUODENOSCOPY (EGD), COMBINED N/A 8/3/2022    Procedure: ESOPHAGOGASTRODUODENOSCOPY (EGD);  Surgeon: Ira Andres MD;  Location:  GI     HAND SURGERY       IR GASTRO JEJUNOSTOMY TUBE PLACEMENT  7/27/2022     IR THORACENTESIS  8/29/2022     LEEP TX, CERVICAL  04/07/2017    HECTOR III     LYMPH NODE BIOPSY Left 2005    Left axilla, benign- [place]     MIDLINE INSERTION - DOUBLE LUMEN Left 07/28/2022    20cm, Basilic vein     THORACENTESIS Left 8/29/2022    Procedure: THORACENTESIS;  Surgeon: Bo Capone PA-C;  Location: [facility] OR     THROMBECTOMY UPPER EXTREMITY Left 07/02/2022    Procedure: LEFT RADIAL ARM THROMBECTOMY;  Surgeon: Christie Graham MD;  Location:  OR     TRANSPLANT LUNG RECIPIENT SINGLE X2 Bilateral 06/28/2022    Procedure: Clamshell Incision, Bilateral Sequential Lung Transplant, On Cardiopulmonary Bypass, Flexible Bronchoscopy;  Surgeon: Sue Sunshine MD;  Location:  OR           Family History:     No family history on file.         Social History:     Social History     Socioeconomic History     Marital status: Single     Spouse name: Not on file     Number of children: Not on file     Years of education: Not on file      Highest education level: Not on file   Occupational History     Not on file   Tobacco Use     Smoking status: Former Smoker     Years: 30.00     Types: Cigarettes     Quit date: 2020     Years since quittin.8     Smokeless tobacco: Never Used   Substance and Sexual Activity     Alcohol use: Not Currently     Drug use: Not Currently     Types: Marijuana, Methamphetamines     Comment: hx:marijuana and methamphetamine-quit both unsure ?  2-3 yrs ago     Sexual activity: Not on file   Other Topics Concern     Parent/sibling w/ CABG, MI or angioplasty before 65F 55M? Not Asked   Social History Narrative     Not on file     Social Determinants of Health     Financial Resource Strain: Not on file   Food Insecurity: Not on file   Transportation Needs: Not on file   Physical Activity: Not on file   Stress: Not on file   Social Connections: Not on file   Intimate Partner Violence: Not on file   Housing Stability: Not on file            Medications:     Current Outpatient Medications   Medication     acetaminophen (TYLENOL) 160 MG/5ML liquid     alcohol swab prep pads     aspirin (ASA) 81 MG EC tablet     blood glucose (CONTOUR NEXT TEST) test strip     blood glucose (NO BRAND SPECIFIED) lancets standard     blood glucose monitoring (CONTOUR NEXT MONITOR W/DEVICE KIT) meter device kit     calcium carbonate 600 mg-vitamin D 400 units (CALTRATE) 600-400 MG-UNIT per tablet     CELLCEPT (BRAND) 200 MG/ML suspension     fludrocortisone (FLORINEF) 0.1 MG tablet     insulin aspart (NOVOLOG PEN) 100 UNIT/ML pen     insulin glargine (LANTUS PEN) 100 UNIT/ML pen     insulin pen needle (31G X 5 MM) 31G X 5 MM miscellaneous     metoprolol tartrate (LOPRESSOR) 50 MG tablet     Multiple Vitamins-Minerals (MULTIVITAMINS W/MINERALS) liquid     mycophenolate (GENERIC EQUIVALENT) 200 MG/ML suspension     Nutritional Supplements (GLUCOSE MANAGEMENT) TABS     nystatin (MYCOSTATIN) 480229 UNIT/ML suspension     ondansetron (ZOFRAN ODT) 4  "MG ODT tab     oxyCODONE (ROXICODONE) 5 MG tablet     pantoprazole (PROTONIX) 2 mg/mL SUSP suspension     predniSONE (DELTASONE) 5 MG tablet     protein modular (PROSOURCE TF) LIQD     [employer] Container MISC     simethicone (MYLICON) 40 MG/0.6ML suspension     tacrolimus (GENERIC EQUIVALENT) 1 mg/mL suspension     valGANciclovir (VALCYTE) 50 MG/ML solution     No current facility-administered medications for this visit.            Physical Exam:   BP (!) 146/82 (BP Location: Left arm, Patient Position: Sitting, Cuff Size: Adult Regular)   Pulse 96   Temp 98.1  F (36.7  C) (Oral)   Resp 28   Ht 1.6 m (5' 3\")   Wt 70.3 kg (155 lb)   SpO2 97%   BMI 27.46 kg/m   On 0.5L NC    GENERAL: alert, NAD  HEENT: NCAT, EOMI, no scleral icterus, oral mucosa moist and without lesions  Neck: no cervical or supraclavicular adenopathy  Lungs: moderate airflow, decreased in bases bilaterally  CV: RRR, S1S2, no murmurs noted  Abdomen: normoactive BS, soft, tenderness to palpation near GJ exchange site  Lymph: +1 pedal edema b/l  Neuro: AAO X 3, CN 2-12 grossly intact  Psychiatric: normal affect, good eye contact  Skin: no rash, jaundice or lesions on limited exam         Data:   All laboratory and imaging data reviewed.      Recent Results (from the past 168 hour(s))   Respiratory Aerobic Bacterial Culture with Gram Stain    Collection Time: 08/26/22  7:45 AM    Specimen: Expectorate; Sputum   Result Value Ref Range    Culture       >10 Squamous epithelial cells/low power field indicates oral contamination. Please recollect.    Gram Stain Result >10 Squamous epithelial cells/low power field     Gram Stain Result >25 PMNs/low power field     Gram Stain Result 4+ Mixed josue    Basic metabolic panel    Collection Time: 08/29/22 10:39 AM   Result Value Ref Range    Sodium 141 133 - 144 mmol/L    Potassium 3.6 3.4 - 5.3 mmol/L    Chloride 97 94 - 109 mmol/L    Carbon Dioxide (CO2) 44 (H) 20 - 32 mmol/L    Anion Gap <1 (L) 3 - 14 " mmol/L    Urea Nitrogen 54 (H) 7 - 30 mg/dL    Creatinine 1.24 (H) 0.52 - 1.04 mg/dL    Calcium 8.7 8.5 - 10.1 mg/dL    Glucose 104 (H) 70 - 99 mg/dL    GFR Estimate 50 (L) >60 mL/min/1.73m2   Magnesium    Collection Time: 08/29/22 10:39 AM   Result Value Ref Range    Magnesium 2.8 (H) 1.6 - 2.3 mg/dL   CBC with platelets    Collection Time: 08/29/22 10:39 AM   Result Value Ref Range    WBC Count 8.8 4.0 - 11.0 10e3/uL    RBC Count 2.35 (L) 3.80 - 5.20 10e6/uL    Hemoglobin 7.1 (L) 11.7 - 15.7 g/dL    Hematocrit 23.7 (L) 35.0 - 47.0 %     (H) 78 - 100 fL    MCH 30.2 26.5 - 33.0 pg    MCHC 30.0 (L) 31.5 - 36.5 g/dL    RDW 17.3 (H) 10.0 - 15.0 %    Platelet Count 283 150 - 450 10e3/uL   CMV DNA quantification    Collection Time: 08/29/22 10:39 AM    Specimen: Arm, Left; Blood   Result Value Ref Range    CMV DNA IU/mL Not Detected Not Detected IU/mL   Tacrolimus level    Collection Time: 08/29/22 10:39 AM   Result Value Ref Range    Tacrolimus by Tandem Mass Spectrometry 14.5 5.0 - 15.0 ug/L    Tacrolimus Last Dose Date 8/29/2022     Tacrolimus Last Dose Time  8:15 AM    Blood gas venous    Collection Time: 08/29/22 10:39 AM   Result Value Ref Range    pH Venous 7.42 7.32 - 7.43    pCO2 Venous 71 (H) 40 - 50 mm Hg    pO2 Venous 24 (L) 25 - 47 mm Hg    Bicarbonate Venous 46 (HH) 21 - 28 mmol/L    Base Excess/Deficit (+/-) 19.1 (H) -7.7 - 1.9 mmol/L    FIO2 21    IgG    Collection Time: 08/29/22 10:39 AM   Result Value Ref Range    Immunoglobulin G 672 610 - 1,616 mg/dL   Glucose fluid    Collection Time: 08/29/22 12:19 PM   Result Value Ref Range    Glucose Fluid Source Pleural Cavity, Left     Glucose fluid 103 mg/dL   pH fluid    Collection Time: 08/29/22 12:19 PM   Result Value Ref Range    pH Fluid Source Pleural Cavity, Left     pH Fluid 7.7 pH   Protein fluid    Collection Time: 08/29/22 12:19 PM   Result Value Ref Range    Protein Fluid Source Pleural Cavity, Left     Protein Total Fluid 1.6 g/dL   Lactate  dehydrogenase fluid    Collection Time: 08/29/22 12:19 PM   Result Value Ref Range    LD Fluid Source Pleural Cavity, Left     Lactate dehydrogenase fluid 131 U/L   Pleural fluid Aerobic Bacterial Culture Routine with Gram Stain    Collection Time: 08/29/22 12:19 PM    Specimen: Pleural Cavity, Left; Pleural fluid   Result Value Ref Range    Culture No growth after 1 day     Gram Stain Result No organisms seen     Gram Stain Result No white blood cells seen    Cell Count Body Fluid    Collection Time: 08/29/22 12:19 PM   Result Value Ref Range    Color Orange (A) Colorless, Yellow    Clarity Hazy (A) Clear, Bloody    Total Nucleated Cells 203 /uL    Cell Count Fluid Source Pleural Cavity, Left    Differential Body Fluid    Collection Time: 08/29/22 12:19 PM   Result Value Ref Range    % Neutrophils 3 %    % Lymphocytes 92 %    % Monocyte/Macrophages 6 %   Glucose by meter    Collection Time: 08/31/22 10:06 AM   Result Value Ref Range    GLUCOSE BY METER POCT 68 (L) 70 - 99 mg/dL   Comprehensive metabolic panel    Collection Time: 08/31/22 10:35 AM   Result Value Ref Range    Sodium 145 136 - 145 mmol/L    Potassium 3.3 (L) 3.4 - 5.3 mmol/L    Creatinine 1.30 (H) 0.51 - 0.95 mg/dL    Urea Nitrogen 53.8 (H) 8.0 - 23.0 mg/dL    Chloride 97 (L) 98 - 107 mmol/L    Carbon Dioxide (CO2) 41 (H) 22 - 29 mmol/L    Anion Gap 7 7 - 15 mmol/L    Glucose 65 (L) 70 - 99 mg/dL    Calcium 9.0 8.8 - 10.2 mg/dL    Protein Total 6.0 (L) 6.4 - 8.3 g/dL    Albumin 3.3 (L) 3.5 - 5.2 g/dL    Bilirubin Total 0.2 <=1.2 mg/dL    Alkaline Phosphatase 102 35 - 104 U/L    AST 20 10 - 35 U/L    ALT 13 10 - 35 U/L    GFR Estimate 47 (L) >60 mL/min/1.73m2   Extra Blue Top Tube    Collection Time: 08/31/22 10:37 AM   Result Value Ref Range    Hold Specimen JIC    Extra Red Top Tube    Collection Time: 08/31/22 10:37 AM   Result Value Ref Range    Hold Specimen JIC    Extra Green Top (Lithium Heparin) Tube    Collection Time: 08/31/22 10:37 AM    Result Value Ref Range    Hold Specimen JIC    Extra Purple Top Tube    Collection Time: 08/31/22 10:37 AM   Result Value Ref Range    Hold Specimen JIC    Glucose by meter    Collection Time: 08/31/22 11:10 AM   Result Value Ref Range    GLUCOSE BY METER POCT 139 (H) 70 - 99 mg/dL   Glucose by meter    Collection Time: 08/31/22  1:20 PM   Result Value Ref Range    GLUCOSE BY METER POCT 55 (L) 70 - 99 mg/dL   Glucose by meter    Collection Time: 08/31/22  2:59 PM   Result Value Ref Range    GLUCOSE BY METER POCT 101 (H) 70 - 99 mg/dL   Glucose by meter    Collection Time: 08/31/22  4:29 PM   Result Value Ref Range    GLUCOSE BY METER POCT 121 (H) 70 - 99 mg/dL       Date Place TLC (%) FVC (%) FEV1 (%) FEV1/FVC DLCO (%) Note   9/1/22    1.07 35 1.02 42 95                                                    PFT interpretation:  Maneuver: valid, but did not meet ATS guidelines  Sep 1, 2022  PFT Interpretation:  Severe restrictive ventilatory defect.  Decreased from previous.  Below recent best. Still establishing baseline  Valid Maneuver      Again, thank you for allowing me to participate in the care of your patient.        Sincerely,        Claribel Franks MD

## 2022-09-01 NOTE — PROGRESS NOTES
This is a recent snapshot of the patient's [facility] medical record.  For current drug dose and complete information and questions, call [phone] or In Basket pool, fv home infusion ([id number])  CSN Number:  [id number]

## 2022-09-01 NOTE — LETTER
2022         RE: Sofie Rodriguez  [address] [place] [address]        Dear Colleague,    Thank you for referring your patient, Sofie Rodriguez, to the [facility]. Please see a copy of my visit note below.    Blood Product Transfusion Nursing Note:    Sofie Rodriguez presents today to [facility] for a 1 unit PRBC transfusion and potassium infusion.   During today's [facility] appointment orders from Dr. Claribel Franks were completed.    Progress note:  ID verified by name and .  Assessment completed.  V[facility].    Verbal and printed education given to patient/representative regarding transfusion and possible side effects.  Patient/representative verbalized understanding.     present during visit today: Not Applicable.    All pertinent labs reviewed prior to infusion: YES. Hemoglobin today of 6.3. Meets parameters for transfusion. Discussed with provider that due to time constraints today, patient only able to receive 1 unit packed red blood cells in addition to IV potassium while in [facility].    20meq of potassium infused over approximately 2 hours. 40meq potassium also given via J-tube.    Date of consent or authorization: today, 2022, with Dr. Franks in clinic.    Patient tolerated the procedure well  Transfusion given over approximately  2 hours.    Report given to RIYA Weinstein at 1645. Care transferred at that time.    Discharge Plan:    Discharge instructions were reviewed with patient Yes  Patient/representative verbalized understanding of discharge instructions and all questions answered Yes.    Administrations This Visit     potassium chloride (KLOR-CON) Packet 40 mEq     Admin Date  2022 Action  Given Dose  40 mEq Route  Oral Administered By  Charis Trevino, RN          sodium chloride 0.9 % 100 mL with potassium chloride 10 mEq infusion     Admin Date  2022 Action  New Bag Dose   Rate  115 mL/hr  Route  Intravenous Administered By  Charis Trevino, RN                Charis DeSmidt, RN    BP (!) 143/73   Pulse 102   Temp 98.7  F (37.1  C) (Oral)   Resp 20   SpO2 97%           Again, thank you for allowing me to participate in the care of your patient.        Sincerely,        [facility]

## 2022-09-01 NOTE — TELEPHONE ENCOUNTER
Tacrolimus level 5.5 at 12 hours, on 9/1/22.  Goal 8-12.   Current dose 3.5 mg in AM, 3.5 mg in PM    Dose changed to 3.5 mg in AM, 4 mg in PM   Recheck level in 7 days    LVM with Yue message sent

## 2022-09-01 NOTE — PATIENT INSTRUCTIONS
~~~~~~~~~~~~~~~~~~~~~~~~~    Thoracic Transplant Office phone [phone], fax [phone]    Office Hours 8:30 - 5:00     For after-hours urgent issues, please dial [phone], and ask to speak with the Thoracic Transplant Coordinator On-Call.  --------------------  To expedite your medication refill(s), please contact your pharmacy and have them fax a refill request to: [phone]  .   *Please allow 3 business days for routine medication refills.  *Please allow 5 business days for controlled substance medication refills.    **For Diabetic medications and supplies refill(s), please contact your pharmacy and have them contact your Endocrine team.  --------------------  For scheduling appointments call [phone].  --------------------  Please Note: If you are active on [employer], all future test results will be sent by [employer] message only, and will no longer be called to patient. You may also receive communication directly from your physician.

## 2022-09-01 NOTE — PATIENT INSTRUCTIONS
Dear Sofie Rodriguez    Thank you for choosing [facility] ([facility]) for your blood transfusion.  The following information is a summary of our appointment as well as important reminders.      We look forward in seeing you on your next appointment here at [facility] ([facility]).  Please don t hesitate to call us at [phone] to reschedule any of your appointments or to speak with one of the [facility] registered nurses.  It was a pleasure taking care of you today.    Sincerely,    [facility]  [address] [place] [address]  [id number]  Phone:  [phone]

## 2022-09-01 NOTE — NURSING NOTE
"Chief Complaint   Patient presents with     RECHECK     S/P Lung TX 6/28/2022     Vital signs:  Temp: 98.1  F (36.7  C) Temp src: Oral BP: (!) 146/82 Pulse: 96   Resp: 28 SpO2: 97 % (0.5 lpm via NC)     Height: 160 cm (5' 3\") Weight: 70.3 kg (155 lb)  Estimated body mass index is 27.46 kg/m  as calculated from the following:    Height as of this encounter: 1.6 m (5' 3\").    Weight as of this encounter: 70.3 kg (155 lb).    Rika Mora, [facility]  9/1/2022 11:09 AM      "

## 2022-09-01 NOTE — PROGRESS NOTES
Blood Product Transfusion Nursing Note:    Sofie Rodriguez presents today to [facility] for a 1 unit PRBC transfusion and potassium infusion.   During today's [facility] appointment orders from Dr. Claribel Franks were completed.    Progress note:  ID verified by name and .  Assessment completed.  Vitals were stable throughout time in [facility].    Verbal and printed education given to patient/representative regarding transfusion and possible side effects.  Patient/representative verbalized understanding.     present during visit today: Not Applicable.    All pertinent labs reviewed prior to infusion: YES. Hemoglobin today of 6.3. Meets parameters for transfusion. Discussed with provider that due to time constraints today, patient only able to receive 1 unit packed red blood cells in addition to IV potassium while in [facility].    20meq of potassium infused over approximately 2 hours. 40meq potassium also given via J-tube.    Date of consent or authorization: today, 2022, with Dr. Franks in clinic.    Patient tolerated the procedure well  Transfusion given over approximately  2 hours.    Report given to RIYA Weinstein at 1645. Care transferred at that time.    Discharge Plan:    Discharge instructions were reviewed with patient Yes  Patient/representative verbalized understanding of discharge instructions and all questions answered Yes.    Administrations This Visit     potassium chloride (KLOR-CON) Packet 40 mEq     Admin Date  2022 Action  Given Dose  40 mEq Route  Oral Administered By  Charis Trevino RN          sodium chloride 0.9 % 100 mL with potassium chloride 10 mEq infusion     Admin Date  2022 Action  New Bag Dose   Rate  115 mL/hr Route  Intravenous Administered By  Charis Trevino RN Kathryn DeSmidt, RN    BP (!) 143/73   Pulse 102   Temp 98.7  F (37.1  C) (Oral)   Resp 20   SpO2 97%

## 2022-09-02 ENCOUNTER — TELEPHONE (OUTPATIENT)
Dept: TRANSPLANT | Facility: CLINIC | Age: 62
End: 2022-09-02

## 2022-09-02 ENCOUNTER — LAB (OUTPATIENT)
Dept: LAB | Facility: CLINIC | Age: 62
End: 2022-09-02
Payer: MEDICARE

## 2022-09-02 ENCOUNTER — HOSPITAL ENCOUNTER (OUTPATIENT)
Dept: CARDIAC REHAB | Facility: CLINIC | Age: 62
Discharge: HOME | End: 2022-09-02
Attending: INTERNAL MEDICINE
Payer: MEDICARE

## 2022-09-02 DIAGNOSIS — D84.9 IMMUNOSUPPRESSED STATUS (H): ICD-10-CM

## 2022-09-02 DIAGNOSIS — R93.89 ABNORMAL FINDINGS ON DIAGNOSTIC IMAGING OF CARDIOVASCULAR SYSTEM: ICD-10-CM

## 2022-09-02 DIAGNOSIS — Z94.2 S/P LUNG TRANSPLANT (H): ICD-10-CM

## 2022-09-02 DIAGNOSIS — D64.9 ANEMIA: ICD-10-CM

## 2022-09-02 DIAGNOSIS — J98.6 DIAPHRAGM DYSFUNCTION: ICD-10-CM

## 2022-09-02 DIAGNOSIS — Z94.2 LUNG TRANSPLANT RECIPIENT (H): Primary | ICD-10-CM

## 2022-09-02 LAB
ANION GAP SERPL CALCULATED.3IONS-SCNC: 11 MMOL/L (ref 7–15)
BUN SERPL-MCNC: 51.8 MG/DL (ref 8–23)
CALCIUM SERPL-MCNC: 9.2 MG/DL (ref 8.8–10.2)
CHLORIDE SERPL-SCNC: 100 MMOL/L (ref 98–107)
CREAT SERPL-MCNC: 1.34 MG/DL (ref 0.51–0.95)
DEPRECATED HCO3 PLAS-SCNC: 36 MMOL/L (ref 22–29)
EBV DNA # SPEC NAA+PROBE: <500 COPIES/ML
EBV DNA SPEC NAA+PROBE-LOG#: <2.7 {LOG_COPIES}/ML
ERYTHROCYTE [DISTWIDTH] IN BLOOD BY AUTOMATED COUNT: 20.1 % (ref 10–15)
GFR SERPL CREATININE-BSD FRML MDRD: 45 ML/MIN/1.73M2
GLUCOSE SERPL-MCNC: 123 MG/DL (ref 70–99)
HCT VFR BLD AUTO: 26.7 % (ref 35–47)
HGB BLD-MCNC: 8.1 G/DL (ref 11.7–15.7)
MAGNESIUM SERPL-MCNC: 2.4 MG/DL (ref 1.7–2.3)
MCH RBC QN AUTO: 29.8 PG (ref 26.5–33)
MCHC RBC AUTO-ENTMCNC: 30.3 G/DL (ref 31.5–36.5)
MCV RBC AUTO: 98 FL (ref 78–100)
PLATELET # BLD AUTO: 222 10E3/UL (ref 150–450)
POTASSIUM SERPL-SCNC: 3.7 MMOL/L (ref 3.4–5.3)
RBC # BLD AUTO: 2.72 10E6/UL (ref 3.8–5.2)
SODIUM SERPL-SCNC: 147 MMOL/L (ref 136–145)
WBC # BLD AUTO: 9.5 10E3/UL (ref 4–11)

## 2022-09-02 PROCEDURE — 85027 COMPLETE CBC AUTOMATED: CPT | Performed by: PATHOLOGY

## 2022-09-02 PROCEDURE — 80048 BASIC METABOLIC PNL TOTAL CA: CPT | Performed by: PATHOLOGY

## 2022-09-02 PROCEDURE — 36415 COLL VENOUS BLD VENIPUNCTURE: CPT | Performed by: PATHOLOGY

## 2022-09-02 PROCEDURE — 83735 ASSAY OF MAGNESIUM: CPT | Performed by: PATHOLOGY

## 2022-09-02 PROCEDURE — G0239 OTH RESP PROC, GROUP: HCPCS

## 2022-09-02 RX ORDER — VALGANCICLOVIR HYDROCHLORIDE 50 MG/ML
450 POWDER, FOR SOLUTION ORAL
Qty: 118 ML | Refills: 3 | Status: SHIPPED | OUTPATIENT
Start: 2022-09-02 | End: 2022-10-08

## 2022-09-02 RX ORDER — MEPERIDINE HYDROCHLORIDE 25 MG/ML
25 INJECTION INTRAMUSCULAR; INTRAVENOUS; SUBCUTANEOUS EVERY 30 MIN PRN
Status: CANCELLED | OUTPATIENT
Start: 2022-09-08

## 2022-09-02 RX ORDER — MYCOPHENOLATE MOFETIL 200 MG/ML
750 POWDER, FOR SUSPENSION ORAL 2 TIMES DAILY
Qty: 225 ML | Refills: 11 | Status: SHIPPED | OUTPATIENT
Start: 2022-09-02 | End: 2022-10-08

## 2022-09-02 RX ORDER — ALBUTEROL SULFATE 0.83 MG/ML
2.5 SOLUTION RESPIRATORY (INHALATION)
Status: CANCELLED | OUTPATIENT
Start: 2022-09-08

## 2022-09-02 RX ORDER — AMINO AC/PROTEIN HYDR/WHEY PRO 10G-100/30
1 LIQUID (ML) ORAL DAILY
Qty: 30 PACKET | Refills: 11 | Status: SHIPPED | OUTPATIENT
Start: 2022-09-02 | End: 2022-11-14

## 2022-09-02 RX ORDER — EPINEPHRINE 1 MG/ML
0.3 INJECTION, SOLUTION, CONCENTRATE INTRAVENOUS EVERY 5 MIN PRN
Status: CANCELLED | OUTPATIENT
Start: 2022-09-08

## 2022-09-02 RX ORDER — PENTAMIDINE ISETHIONATE 300 MG/300MG
300 INHALANT RESPIRATORY (INHALATION) ONCE
Status: CANCELLED
Start: 2022-09-08 | End: 2022-09-08

## 2022-09-02 RX ORDER — METOPROLOL TARTRATE 50 MG
50 TABLET ORAL 2 TIMES DAILY
Qty: 60 TABLET | Refills: 11 | Status: ON HOLD | OUTPATIENT
Start: 2022-09-02 | End: 2022-10-08

## 2022-09-02 RX ORDER — GUAIFENESIN 600 MG/1
15 TABLET, EXTENDED RELEASE ORAL DAILY
Qty: 450 ML | Refills: 11 | Status: ON HOLD | OUTPATIENT
Start: 2022-09-02 | End: 2022-10-08

## 2022-09-02 RX ORDER — DIPHENHYDRAMINE HYDROCHLORIDE 50 MG/ML
50 INJECTION INTRAMUSCULAR; INTRAVENOUS
Status: CANCELLED
Start: 2022-09-08

## 2022-09-02 RX ORDER — ALBUTEROL SULFATE 90 UG/1
1-2 AEROSOL, METERED RESPIRATORY (INHALATION)
Status: CANCELLED
Start: 2022-09-08

## 2022-09-02 RX ORDER — METHYLPREDNISOLONE SODIUM SUCCINATE 125 MG/2ML
125 INJECTION, POWDER, LYOPHILIZED, FOR SOLUTION INTRAMUSCULAR; INTRAVENOUS
Status: CANCELLED
Start: 2022-09-08

## 2022-09-02 RX ORDER — ALBUTEROL SULFATE 0.83 MG/ML
2.5 SOLUTION RESPIRATORY (INHALATION) ONCE
Status: CANCELLED
Start: 2022-09-08 | End: 2022-09-08

## 2022-09-02 NOTE — TELEPHONE ENCOUNTER
Spoke with pt's daughter and explained that Sofie will be starting pentamidine nebs monthly.  Therapy plan in place.  Julia verbalized understanding and will call with any further questions or concerns.

## 2022-09-02 NOTE — TELEPHONE ENCOUNTER
Patient daughter calls to state patient was more short of breath last night (on 0.5L O2). This morning, patient is at 2L O2 and breathing comfortably.   Patient took PM dose of lasix last night, did not seem to urinate more.   Patient received 1U pRBC and 20meQ KCl (200mL fluid) yesterday afternoon.    Patient will take AM dose of Lasix 20mg. Labs at 9:45 before rehab. Will discuss further with Dr. Franks and check in this afternoon.     Dr. Franks staff message last night:   - hold florinef  - Hgb recheck 8.1, will hold off on 2nd unit of pRBC  - increase afternoon dose of lasix to 40mg    Daughter verbalized understanding and agreement of plan.     Addendum:  Check in in afternoon. Patient stable on 2L O2. Went to pulmonary rehab in AM and went well. Knows how to get ahold of transplant coordinator on call. No further concerns at this time. Will call back with questions, concerns, updates.

## 2022-09-03 ENCOUNTER — LAB (OUTPATIENT)
Dept: LAB | Facility: CLINIC | Age: 62
End: 2022-09-03
Payer: MEDICARE

## 2022-09-03 ENCOUNTER — TELEPHONE (OUTPATIENT)
Dept: TRANSPLANT | Facility: CLINIC | Age: 62
End: 2022-09-03

## 2022-09-03 DIAGNOSIS — R09.02 HYPOXIA: ICD-10-CM

## 2022-09-03 DIAGNOSIS — R60.9 SWELLING: ICD-10-CM

## 2022-09-03 DIAGNOSIS — Z94.2 LUNG REPLACED BY TRANSPLANT (H): ICD-10-CM

## 2022-09-03 DIAGNOSIS — Z94.2 S/P LUNG TRANSPLANT (H): ICD-10-CM

## 2022-09-03 DIAGNOSIS — Z94.2 S/P LUNG TRANSPLANT (H): Primary | ICD-10-CM

## 2022-09-03 DIAGNOSIS — D63.1 ANEMIA OF CHRONIC RENAL FAILURE, STAGE 3B (H): ICD-10-CM

## 2022-09-03 DIAGNOSIS — N18.32 ANEMIA OF CHRONIC RENAL FAILURE, STAGE 3B (H): ICD-10-CM

## 2022-09-03 DIAGNOSIS — N18.32 STAGE 3B CHRONIC KIDNEY DISEASE (H): ICD-10-CM

## 2022-09-03 LAB
ANION GAP SERPL CALCULATED.3IONS-SCNC: 10 MMOL/L (ref 7–15)
BACTERIA PLR CULT: NO GROWTH
BUN SERPL-MCNC: 63.9 MG/DL (ref 8–23)
CALCIUM SERPL-MCNC: 9.4 MG/DL (ref 8.8–10.2)
CHLORIDE SERPL-SCNC: 96 MMOL/L (ref 98–107)
CREAT SERPL-MCNC: 1.71 MG/DL (ref 0.51–0.95)
DEPRECATED HCO3 PLAS-SCNC: 38 MMOL/L (ref 22–29)
ERYTHROCYTE [DISTWIDTH] IN BLOOD BY AUTOMATED COUNT: 18.8 % (ref 10–15)
GFR SERPL CREATININE-BSD FRML MDRD: 34 ML/MIN/1.73M2
GLUCOSE SERPL-MCNC: 161 MG/DL (ref 70–99)
GRAM STAIN RESULT: NORMAL
GRAM STAIN RESULT: NORMAL
HCT VFR BLD AUTO: 27.3 % (ref 35–47)
HGB BLD-MCNC: 8 G/DL (ref 11.7–15.7)
MCH RBC QN AUTO: 29.5 PG (ref 26.5–33)
MCHC RBC AUTO-ENTMCNC: 29.3 G/DL (ref 31.5–36.5)
MCV RBC AUTO: 101 FL (ref 78–100)
PLATELET # BLD AUTO: 230 10E3/UL (ref 150–450)
POTASSIUM SERPL-SCNC: 4.5 MMOL/L (ref 3.4–5.3)
RBC # BLD AUTO: 2.71 10E6/UL (ref 3.8–5.2)
SODIUM SERPL-SCNC: 144 MMOL/L (ref 136–145)
WBC # BLD AUTO: 10.6 10E3/UL (ref 4–11)

## 2022-09-03 PROCEDURE — 83540 ASSAY OF IRON: CPT | Performed by: PATHOLOGY

## 2022-09-03 PROCEDURE — 85027 COMPLETE CBC AUTOMATED: CPT | Performed by: PATHOLOGY

## 2022-09-03 PROCEDURE — 80048 BASIC METABOLIC PNL TOTAL CA: CPT | Performed by: PATHOLOGY

## 2022-09-03 PROCEDURE — 82728 ASSAY OF FERRITIN: CPT | Performed by: INTERNAL MEDICINE

## 2022-09-03 PROCEDURE — 83550 IRON BINDING TEST: CPT | Performed by: PATHOLOGY

## 2022-09-03 PROCEDURE — 36415 COLL VENOUS BLD VENIPUNCTURE: CPT | Performed by: PATHOLOGY

## 2022-09-03 NOTE — TELEPHONE ENCOUNTER
"Coordinator on call Received page from daughter Julia that patient \"SOB and Coughing of blood\". Tried to call Julia back x 2 but got her voicemail. Left message to call back.  "

## 2022-09-03 NOTE — TELEPHONE ENCOUNTER
Received call re: patient had increased SOB upon waking this AM. Patient coughed up small (less than tsp) of blood. Upon waking SATs were 80s on 1L increased to 3L now at 94%. Patient has swelling in lower extremities. Noted lasted seen by Dr Franks on 9/1/2022 started on Lasix 20mg BID due to concern with mild fluid retention.  Last given lasix at 4pm on 9/2/2022.Left side Thoracentesis completed on 8/29 with minimal output of 8mL/12mL with some bleeding.       Patient also transfused with one RBC on 9/1/2022 due to low Hgb 6.3 was 8.1 on 9/2/2022 redraw.     Discussed patient with Dr Paredes, attending for transplant firm with following plan made:      - continue to monitor sputum for any additional bloody sputum and or signs of active bleeding.    - take morning lasix and reassess breathing around noon 9/3/2022 to determine if additional lasix needed. Watch for signs on hypokalemia. Monitor output/edema with lasix.    - redraw labs 9/4/2022 at [facility] (BMP/CBC)  - continue to monitor SOB and if any symptoms worsen patient should go to ER     Reviewed plan of care with patient's care provider, daughter Josiah who reports understanding and agrees with plan of care.

## 2022-09-05 ENCOUNTER — TELEPHONE (OUTPATIENT)
Dept: TRANSPLANT | Facility: CLINIC | Age: 62
End: 2022-09-05

## 2022-09-05 NOTE — Clinical Note
Status update - breathing and edema have improved. Continues to cough up small amount of bloody sputum in AM only, once daily. Plan made for transplant coordinator to follow up on 9/6/2022 to discuss any changes appointments needed - scheduled for CXR, Labs, PFT 9/7/2022.

## 2022-09-05 NOTE — TELEPHONE ENCOUNTER
Patient called to report she had coughed up a small amount of bloody sputum this morning. Noted it was less than a teaspoon, brighter in color. Patient reported small amounts of bloody sputum once daily in AM only,  when she gets up in morning. Then nothing more through out day.     Patient reports her breathing has improved since previous call on 9/3/2022. Edema in lower extremities improved.     Plan made to continue to monitor. Patient scheduled for labs, PFT, CXR on 9/7/2022 and visit with transplant provider on 9/8/2022. Patient instructed to call on call coordinator if symptoms worsen or she continues to cough bloody sputum through out the day.     Message sent to transplant coordinator.

## 2022-09-06 ENCOUNTER — TELEPHONE (OUTPATIENT)
Dept: PHARMACY | Facility: CLINIC | Age: 62
End: 2022-09-06

## 2022-09-06 ENCOUNTER — HOSPITAL ENCOUNTER (OUTPATIENT)
Dept: CARDIAC REHAB | Facility: CLINIC | Age: 62
Discharge: HOME | End: 2022-09-06
Attending: INTERNAL MEDICINE
Payer: MEDICARE

## 2022-09-06 DIAGNOSIS — N18.32 ANEMIA OF CHRONIC RENAL FAILURE, STAGE 3B (H): ICD-10-CM

## 2022-09-06 DIAGNOSIS — Z94.2 LUNG REPLACED BY TRANSPLANT (H): Primary | ICD-10-CM

## 2022-09-06 DIAGNOSIS — N18.32 STAGE 3B CHRONIC KIDNEY DISEASE (H): ICD-10-CM

## 2022-09-06 DIAGNOSIS — D63.1 ANEMIA OF CHRONIC RENAL FAILURE, STAGE 3B (H): ICD-10-CM

## 2022-09-06 LAB
DONOR IDENTIFICATION: NORMAL
DQB2: 7033
DSA COMMENTS: NORMAL
DSA PRESENT: YES
DSA TEST METHOD: NORMAL
FERRITIN SERPL-MCNC: 343 NG/ML (ref 11–328)
IRON BINDING CAPACITY (ROCHE): 233 UG/DL (ref 240–430)
IRON SATN MFR SERPL: 9 % (ref 15–46)
IRON SERPL-MCNC: 21 UG/DL (ref 37–145)
ORGAN: NORMAL
SA 1 CELL: NORMAL
SA 1 TEST METHOD: NORMAL
SA 2 CELL: NORMAL
SA 2 TEST METHOD: NORMAL
SA1 HI RISK ABY: NORMAL
SA1 MOD RISK ABY: NORMAL
SA2 HI RISK ABY: NORMAL
SA2 MOD RISK ABY: NORMAL
UNACCEPTABLE ANTIGENS: NORMAL
UNOS CPRA: 37
ZZZSA 1  COMMENTS: NORMAL
ZZZSA 2 COMMENTS: NORMAL

## 2022-09-06 PROCEDURE — G0239 OTH RESP PROC, GROUP: HCPCS

## 2022-09-06 NOTE — TELEPHONE ENCOUNTER
Anemia Management Note - Enrollment  SUBJECTIVE/OBJECTIVE:    Referred by Dr. Claribel Franks on 2022  Primary Diagnosis: Anemia in Chronic Kidney Disease (N18.3, D63.1)   3b  Secondary Diagnosis:  Chronic Kidney Disease, Stage 3 (N18.3)  3b  Lung Tx:    Hgb goal range:  9-10  Epo/Darbo: TBD; Iron levels are pending   Iron regimen:  TBD; Labs are pending  Labs : 2023  Recent JOCELINE use, transfusion, IV iron: NA  RX/TX plans : TBD    No history of stroke, MI and blood clots or cancers. Hx of AFib    Contact:  No Consent to Communicate on File.     Anemia Latest Ref Rng & Units 2022 2022 2022 2022 2022 2022 9/3/2022   Hemoglobin 11.7 - 15.7 g/dL 8.6(L) 8.9(L) 7.6(L) 7.1(L) 6.3(LL) 8.1(L) 8.0(L)   TSAT 15 - 46 % - - 21 - - - -   Ferritin 8 - 252 ng/mL - - 334(H) - - - -       BP Readings from Last 3 Encounters:   22 (!) 138/94   22 (!) 146/82   22 (!) 147/73     Wt Readings from Last 2 Encounters:   22 155 lb (70.3 kg)   22 155 lb (70.3 kg)     Current Outpatient Medications   Medication Sig Dispense Refill     acetaminophen (TYLENOL) 160 MG/5ML liquid Take 31.25 mLs (1,000 mg) by mouth 2 times daily 1200 mL 3     alcohol swab prep pads Use to swab area of injection/amos as directed. 100 each 6     aspirin (ASA) 81 MG EC tablet Take 1 tablet (81 mg) by mouth daily 30 tablet 11     blood glucose (CONTOUR NEXT TEST) test strip Use to test blood sugar 4 times daily, as directed. 400 strip 0     blood glucose (NO BRAND SPECIFIED) lancets standard Use to test blood sugar 4 times daily or as directed. 400 lancet 0     blood glucose monitoring (CONTOUR NEXT MONITOR W/DEVICE KIT) meter device kit Use to test blood sugar 4 times daily or as directed. 1 kit 0     calcium carbonate 600 mg-vitamin D 400 units (CALTRATE) 600-400 MG-UNIT per tablet 1 tablet by Per J Tube route 2 times daily (with meals) 60 tablet 11     furosemide (LASIX) 20 MG tablet Take  1 tablet (20 mg) by mouth 2 times daily 30 tablet 1     insulin aspart (NOVOLOG PEN) 100 UNIT/ML pen Inject 1-12 Units Subcutaneous every 4 hours 15 mL 0     insulin glargine (LANTUS PEN) 100 UNIT/ML pen Inject 7 Units Subcutaneous 2 times daily 15 mL 0     insulin pen needle (31G X 5 MM) 31G X 5 MM miscellaneous Use one needle daily, as directed 100 each 0     Lidocaine (LIDOCARE) 4 % Patch Place 2 patches onto the skin every 24 hours To prevent lidocaine toxicity, patient should be patch free for 12 hrs daily. 30 patch 3     loperamide (IMODIUM A-D) 2 MG tablet Take 1 tablet (2 mg) by mouth 4 times daily as needed for diarrhea 90 tablet 3     metoprolol tartrate (LOPRESSOR) 50 MG tablet 1 tablet (50 mg) by Per Feeding Tube route 2 times daily 60 tablet 11     Multiple Vitamins-Minerals (MULTIVITAMINS W/MINERALS) liquid 15 mLs by Per J Tube route daily 450 mL 11     mycophenolate (GENERIC EQUIVALENT) 200 MG/ML suspension 3.75 mLs (750 mg) by Per J Tube route 2 times daily 225 mL 11     Nutritional Supplements (GLUCOSE MANAGEMENT) TABS Take 3-4 tablets by mouth as needed (hypoglycemia) Pharmacist may change instructions to fit whatever glucose tab product they have in stock. 120 tablet 1     nystatin (MYCOSTATIN) 517964 UNIT/ML suspension Swish and swallow 10 mLs (1,000,000 Units) in mouth 4 times daily 1200 mL 3     ondansetron (ZOFRAN ODT) 4 MG ODT tab Take 1 tablet (4 mg) by mouth every 6 hours as needed for nausea or vomiting 30 tablet 0     oxyCODONE (ROXICODONE) 5 MG tablet 1 tablet (5 mg) by Per J Tube route every 8 hours as needed for breakthrough pain 15 tablet 0     pantoprazole (PROTONIX) 2 mg/mL SUSP suspension 20 mLs (40 mg) by Per J Tube route 2 times daily 1200 mL 11     predniSONE (DELTASONE) 5 MG tablet 2 tablets (10 mg) by Per J Tube route 2 times daily Start the evening of 8/17; Taper to 10 mg twice a day starting 8/18 with next taper due 9/15. 120 tablet 3     protein modular (PROSOURCE TF) LIQD  1 packet by Per Feeding Tube route daily 30 packet 11     Sharps Container MISC 1 sharps container 1 each 0     simethicone (MYLICON) 40 MG/0.6ML suspension 0.6 mLs (40 mg) by Per Feeding Tube route every 6 hours as needed for cramping or flatulence 45 mL 0     tacrolimus (GENERIC EQUIVALENT) 1 mg/mL suspension 3.5 mLs (3.5 mg) by Per J Tube route 2 times daily 210 mL 11     valGANciclovir (VALCYTE) 50 MG/ML solution 9 mLs (450 mg) by Oral or Feeding Tube route three times a week 118 mL 3     ASSESSMENT:  Hgb Not at goal/Initiation of therapy   Ferritin: Pending  TSat: pending  Iron regimen recommended: TBD: Iron levels are pending  Recommended JOCELINE regimen: TBD; Iron levels are pending  Blood Pressure: Stable    PLAN:  1. Patient will be called for enrollment in Anemia Management Service once Iron levels are resulted  2. Need to discuss:  anemia overview, monitoring service and goal hemoglobin range and rationale and risks of JOCELINE blood clots, stroke and increase in blood pressure  3. Dose location: TBD  4. Labs: TBD  5. Pharmacy: TBD      Iron levels are pending. Will follow up with Sofie once Iron levels are back.     Next call date:  9/7/22    Sara Azul RN   Anemia Services  [address] [address]   [email or web address]   Office : [phone]  Fax: [phone]

## 2022-09-07 ENCOUNTER — HOSPITAL ENCOUNTER (OUTPATIENT)
Facility: CLINIC | Age: 62
End: 2022-09-07
Attending: INTERNAL MEDICINE | Admitting: INTERNAL MEDICINE
Payer: MEDICARE

## 2022-09-07 ENCOUNTER — LAB (OUTPATIENT)
Dept: LAB | Facility: CLINIC | Age: 62
End: 2022-09-07
Payer: MEDICARE

## 2022-09-07 ENCOUNTER — TELEPHONE (OUTPATIENT)
Dept: PHARMACY | Facility: CLINIC | Age: 62
End: 2022-09-07

## 2022-09-07 ENCOUNTER — TELEPHONE (OUTPATIENT)
Dept: TRANSPLANT | Facility: CLINIC | Age: 62
End: 2022-09-07

## 2022-09-07 ENCOUNTER — INFUSION THERAPY VISIT (OUTPATIENT)
Dept: INFUSION THERAPY | Facility: CLINIC | Age: 62
DRG: 205 | End: 2022-09-07
Attending: PHYSICIAN ASSISTANT
Payer: MEDICARE

## 2022-09-07 VITALS
RESPIRATION RATE: 18 BRPM | TEMPERATURE: 98.6 F | DIASTOLIC BLOOD PRESSURE: 70 MMHG | SYSTOLIC BLOOD PRESSURE: 133 MMHG | HEART RATE: 93 BPM | OXYGEN SATURATION: 100 %

## 2022-09-07 DIAGNOSIS — N18.32 ANEMIA OF CHRONIC RENAL FAILURE, STAGE 3B (H): ICD-10-CM

## 2022-09-07 DIAGNOSIS — Z94.2 S/P LUNG TRANSPLANT (H): Primary | ICD-10-CM

## 2022-09-07 DIAGNOSIS — N18.32 STAGE 3B CHRONIC KIDNEY DISEASE (H): Primary | ICD-10-CM

## 2022-09-07 DIAGNOSIS — D63.1 ANEMIA OF CHRONIC RENAL FAILURE, STAGE 3B (H): ICD-10-CM

## 2022-09-07 DIAGNOSIS — Z94.2 LUNG TRANSPLANT RECIPIENT (H): Primary | ICD-10-CM

## 2022-09-07 DIAGNOSIS — Z94.2 LUNG TRANSPLANT RECIPIENT (H): ICD-10-CM

## 2022-09-07 DIAGNOSIS — Z94.2 LUNG REPLACED BY TRANSPLANT (H): ICD-10-CM

## 2022-09-07 DIAGNOSIS — N18.32 STAGE 3B CHRONIC KIDNEY DISEASE (H): ICD-10-CM

## 2022-09-07 DIAGNOSIS — Z94.2 S/P LUNG TRANSPLANT (H): ICD-10-CM

## 2022-09-07 DIAGNOSIS — R79.89 ELEVATED SERUM CREATININE: Primary | ICD-10-CM

## 2022-09-07 DIAGNOSIS — Z79.899 ENCOUNTER FOR LONG-TERM (CURRENT) USE OF HIGH-RISK MEDICATION: ICD-10-CM

## 2022-09-07 DIAGNOSIS — D84.9 IMMUNOSUPPRESSED STATUS (H): ICD-10-CM

## 2022-09-07 LAB
ACID FAST STAIN (ARUP): NORMAL
ALBUMIN SERPL BCG-MCNC: 3.4 G/DL (ref 3.5–5.2)
ALP SERPL-CCNC: 98 U/L (ref 35–104)
ALT SERPL W P-5'-P-CCNC: 14 U/L (ref 10–35)
ANION GAP SERPL CALCULATED.3IONS-SCNC: 8 MMOL/L (ref 7–15)
AST SERPL W P-5'-P-CCNC: 16 U/L (ref 10–35)
BASOPHILS # BLD AUTO: 0 10E3/UL (ref 0–0.2)
BASOPHILS NFR BLD AUTO: 0 %
BILIRUB DIRECT SERPL-MCNC: <0.2 MG/DL (ref 0–0.3)
BILIRUB SERPL-MCNC: 0.2 MG/DL
BUN SERPL-MCNC: 90.3 MG/DL (ref 8–23)
CALCIUM SERPL-MCNC: 9.4 MG/DL (ref 8.8–10.2)
CHLORIDE SERPL-SCNC: 93 MMOL/L (ref 98–107)
CMV DNA SPEC NAA+PROBE-ACNC: NOT DETECTED IU/ML
CREAT SERPL-MCNC: 1.9 MG/DL (ref 0.51–0.95)
DEPRECATED HCO3 PLAS-SCNC: 43 MMOL/L (ref 22–29)
EOSINOPHIL # BLD AUTO: 0.1 10E3/UL (ref 0–0.7)
EOSINOPHIL NFR BLD AUTO: 2 %
ERYTHROCYTE [DISTWIDTH] IN BLOOD BY AUTOMATED COUNT: 18.4 % (ref 10–15)
EXPTIME-PRE: 5.54 SEC
FEF2575-%PRED-PRE: 130 %
FEF2575-PRE: 2.9 L/SEC
FEF2575-PRED: 2.22 L/SEC
FEFMAX-%PRED-PRE: 66 %
FEFMAX-PRE: 4.07 L/SEC
FEFMAX-PRED: 6.14 L/SEC
FEV1-%PRED-PRE: 40 %
FEV1-PRE: 0.98 L
FEV1FEV6-PRE: 98 %
FEV1FEV6-PRED: 81 %
FEV1FVC-PRE: 97 %
FEV1FVC-PRED: 79 %
FIFMAX-PRE: 3.12 L/SEC
FVC-%PRED-PRE: 33 %
FVC-PRE: 1.01 L
FVC-PRED: 3.06 L
GFR SERPL CREATININE-BSD FRML MDRD: 30 ML/MIN/1.73M2
GLUCOSE SERPL-MCNC: 87 MG/DL (ref 70–99)
HCT VFR BLD AUTO: 26.2 % (ref 35–47)
HGB BLD-MCNC: 7.7 G/DL (ref 11.7–15.7)
IMM GRANULOCYTES # BLD: 0.2 10E3/UL
IMM GRANULOCYTES NFR BLD: 3 %
LYMPHOCYTES # BLD AUTO: 0.3 10E3/UL (ref 0.8–5.3)
LYMPHOCYTES NFR BLD AUTO: 4 %
MAGNESIUM SERPL-MCNC: 2.7 MG/DL (ref 1.7–2.3)
MCH RBC QN AUTO: 30.3 PG (ref 26.5–33)
MCHC RBC AUTO-ENTMCNC: 29.4 G/DL (ref 31.5–36.5)
MCV RBC AUTO: 103 FL (ref 78–100)
MONOCYTES # BLD AUTO: 0.8 10E3/UL (ref 0–1.3)
MONOCYTES NFR BLD AUTO: 10 %
NEUTROPHILS # BLD AUTO: 6.3 10E3/UL (ref 1.6–8.3)
NEUTROPHILS NFR BLD AUTO: 81 %
NRBC # BLD AUTO: 0 10E3/UL
NRBC BLD AUTO-RTO: 0 /100
PLATELET # BLD AUTO: 285 10E3/UL (ref 150–450)
POTASSIUM SERPL-SCNC: 3.8 MMOL/L (ref 3.4–5.3)
PROT SERPL-MCNC: 6 G/DL (ref 6.4–8.3)
RBC # BLD AUTO: 2.54 10E6/UL (ref 3.8–5.2)
SODIUM SERPL-SCNC: 144 MMOL/L (ref 136–145)
TACROLIMUS BLD-MCNC: 8.3 UG/L (ref 5–15)
TME LAST DOSE: NORMAL H
TME LAST DOSE: NORMAL H
WBC # BLD AUTO: 7.7 10E3/UL (ref 4–11)

## 2022-09-07 PROCEDURE — 80197 ASSAY OF TACROLIMUS: CPT | Performed by: INTERNAL MEDICINE

## 2022-09-07 PROCEDURE — 999N000127 HC STATISTIC PERIPHERAL IV START W US GUIDANCE

## 2022-09-07 PROCEDURE — 86833 HLA CLASS II HIGH DEFIN QUAL: CPT | Performed by: INTERNAL MEDICINE

## 2022-09-07 PROCEDURE — 94375 RESPIRATORY FLOW VOLUME LOOP: CPT | Performed by: INTERNAL MEDICINE

## 2022-09-07 PROCEDURE — 80053 COMPREHEN METABOLIC PANEL: CPT | Performed by: PATHOLOGY

## 2022-09-07 PROCEDURE — 82248 BILIRUBIN DIRECT: CPT | Performed by: PATHOLOGY

## 2022-09-07 PROCEDURE — 258N000003 HC RX IP 258 OP 636: Performed by: INTERNAL MEDICINE

## 2022-09-07 PROCEDURE — 250N000011 HC RX IP 250 OP 636: Performed by: INTERNAL MEDICINE

## 2022-09-07 PROCEDURE — 36415 COLL VENOUS BLD VENIPUNCTURE: CPT | Performed by: PATHOLOGY

## 2022-09-07 PROCEDURE — 85025 COMPLETE CBC W/AUTO DIFF WBC: CPT | Performed by: PATHOLOGY

## 2022-09-07 PROCEDURE — 96365 THER/PROPH/DIAG IV INF INIT: CPT

## 2022-09-07 PROCEDURE — 94640 AIRWAY INHALATION TREATMENT: CPT | Performed by: INTERNAL MEDICINE

## 2022-09-07 PROCEDURE — 83735 ASSAY OF MAGNESIUM: CPT | Performed by: PATHOLOGY

## 2022-09-07 PROCEDURE — 87799 DETECT AGENT NOS DNA QUANT: CPT | Performed by: INTERNAL MEDICINE

## 2022-09-07 PROCEDURE — 94642 AEROSOL INHALATION TREATMENT: CPT | Performed by: INTERNAL MEDICINE

## 2022-09-07 PROCEDURE — 258N000003 HC RX IP 258 OP 636: Performed by: PHYSICIAN ASSISTANT

## 2022-09-07 PROCEDURE — 86832 HLA CLASS I HIGH DEFIN QUAL: CPT | Performed by: INTERNAL MEDICINE

## 2022-09-07 RX ORDER — ALBUTEROL SULFATE 0.83 MG/ML
2.5 SOLUTION RESPIRATORY (INHALATION)
Status: CANCELLED | OUTPATIENT
Start: 2022-09-07

## 2022-09-07 RX ORDER — DIPHENHYDRAMINE HYDROCHLORIDE 50 MG/ML
50 INJECTION INTRAMUSCULAR; INTRAVENOUS
Status: CANCELLED
Start: 2022-09-14

## 2022-09-07 RX ORDER — ALBUTEROL SULFATE 0.83 MG/ML
2.5 SOLUTION RESPIRATORY (INHALATION)
Status: CANCELLED | OUTPATIENT
Start: 2022-10-05

## 2022-09-07 RX ORDER — ALBUTEROL SULFATE 0.83 MG/ML
2.5 SOLUTION RESPIRATORY (INHALATION)
Status: CANCELLED | OUTPATIENT
Start: 2022-09-14

## 2022-09-07 RX ORDER — MEPERIDINE HYDROCHLORIDE 25 MG/ML
25 INJECTION INTRAMUSCULAR; INTRAVENOUS; SUBCUTANEOUS EVERY 30 MIN PRN
Status: CANCELLED | OUTPATIENT
Start: 2022-10-05

## 2022-09-07 RX ORDER — EPINEPHRINE 1 MG/ML
0.3 INJECTION, SOLUTION INTRAMUSCULAR; SUBCUTANEOUS EVERY 5 MIN PRN
Status: CANCELLED | OUTPATIENT
Start: 2022-09-14

## 2022-09-07 RX ORDER — HEPARIN SODIUM (PORCINE) LOCK FLUSH IV SOLN 100 UNIT/ML 100 UNIT/ML
5 SOLUTION INTRAVENOUS
Status: CANCELLED | OUTPATIENT
Start: 2022-09-14

## 2022-09-07 RX ORDER — METHYLPREDNISOLONE SODIUM SUCCINATE 125 MG/2ML
125 INJECTION, POWDER, LYOPHILIZED, FOR SOLUTION INTRAMUSCULAR; INTRAVENOUS
Status: CANCELLED
Start: 2022-10-05

## 2022-09-07 RX ORDER — METHYLPREDNISOLONE SODIUM SUCCINATE 125 MG/2ML
125 INJECTION, POWDER, LYOPHILIZED, FOR SOLUTION INTRAMUSCULAR; INTRAVENOUS
Status: CANCELLED
Start: 2022-09-07

## 2022-09-07 RX ORDER — ALBUTEROL SULFATE 0.83 MG/ML
2.5 SOLUTION RESPIRATORY (INHALATION) ONCE
Status: COMPLETED | OUTPATIENT
Start: 2022-09-07 | End: 2022-09-07

## 2022-09-07 RX ORDER — EPINEPHRINE 1 MG/ML
0.3 INJECTION, SOLUTION, CONCENTRATE INTRAVENOUS EVERY 5 MIN PRN
Status: CANCELLED | OUTPATIENT
Start: 2022-09-07

## 2022-09-07 RX ORDER — HEPARIN SODIUM,PORCINE 10 UNIT/ML
5 VIAL (ML) INTRAVENOUS
Status: CANCELLED | OUTPATIENT
Start: 2022-09-14

## 2022-09-07 RX ORDER — HEPARIN SODIUM (PORCINE) LOCK FLUSH IV SOLN 100 UNIT/ML 100 UNIT/ML
5 SOLUTION INTRAVENOUS
Status: CANCELLED | OUTPATIENT
Start: 2022-09-07

## 2022-09-07 RX ORDER — MEPERIDINE HYDROCHLORIDE 25 MG/ML
25 INJECTION INTRAMUSCULAR; INTRAVENOUS; SUBCUTANEOUS EVERY 30 MIN PRN
Status: CANCELLED | OUTPATIENT
Start: 2022-09-07

## 2022-09-07 RX ORDER — ALBUTEROL SULFATE 90 UG/1
1-2 AEROSOL, METERED RESPIRATORY (INHALATION)
Status: CANCELLED
Start: 2022-09-07

## 2022-09-07 RX ORDER — METHYLPREDNISOLONE SODIUM SUCCINATE 125 MG/2ML
125 INJECTION, POWDER, LYOPHILIZED, FOR SOLUTION INTRAMUSCULAR; INTRAVENOUS
Status: CANCELLED
Start: 2022-09-14

## 2022-09-07 RX ORDER — ALBUTEROL SULFATE 90 UG/1
1-2 AEROSOL, METERED RESPIRATORY (INHALATION)
Status: CANCELLED
Start: 2022-10-05

## 2022-09-07 RX ORDER — PENTAMIDINE ISETHIONATE 300 MG/300MG
300 INHALANT RESPIRATORY (INHALATION) ONCE
Status: COMPLETED | OUTPATIENT
Start: 2022-09-07 | End: 2022-09-07

## 2022-09-07 RX ORDER — ALBUTEROL SULFATE 0.83 MG/ML
2.5 SOLUTION RESPIRATORY (INHALATION) ONCE
Status: CANCELLED
Start: 2022-10-05 | End: 2022-10-05

## 2022-09-07 RX ORDER — EPINEPHRINE 1 MG/ML
0.3 INJECTION, SOLUTION, CONCENTRATE INTRAVENOUS EVERY 5 MIN PRN
Status: CANCELLED | OUTPATIENT
Start: 2022-10-05

## 2022-09-07 RX ORDER — DIPHENHYDRAMINE HYDROCHLORIDE 50 MG/ML
50 INJECTION INTRAMUSCULAR; INTRAVENOUS
Status: CANCELLED
Start: 2022-10-05

## 2022-09-07 RX ORDER — PENTAMIDINE ISETHIONATE 300 MG/300MG
300 INHALANT RESPIRATORY (INHALATION) ONCE
Status: CANCELLED
Start: 2022-10-05 | End: 2022-10-05

## 2022-09-07 RX ORDER — ALBUTEROL SULFATE 90 UG/1
1-2 AEROSOL, METERED RESPIRATORY (INHALATION)
Status: CANCELLED
Start: 2022-09-14

## 2022-09-07 RX ORDER — DIPHENHYDRAMINE HYDROCHLORIDE 50 MG/ML
50 INJECTION INTRAMUSCULAR; INTRAVENOUS
Status: CANCELLED
Start: 2022-09-07

## 2022-09-07 RX ORDER — HEPARIN SODIUM,PORCINE 10 UNIT/ML
5 VIAL (ML) INTRAVENOUS
Status: CANCELLED | OUTPATIENT
Start: 2022-09-07

## 2022-09-07 RX ORDER — MEPERIDINE HYDROCHLORIDE 25 MG/ML
25 INJECTION INTRAMUSCULAR; INTRAVENOUS; SUBCUTANEOUS EVERY 30 MIN PRN
Status: CANCELLED | OUTPATIENT
Start: 2022-09-14

## 2022-09-07 RX ADMIN — FERRIC CARBOXYMALTOSE INJECTION 750 MG: 50 INJECTION, SOLUTION INTRAVENOUS at 15:40

## 2022-09-07 RX ADMIN — SODIUM CHLORIDE 1000 ML: 9 INJECTION, SOLUTION INTRAVENOUS at 15:38

## 2022-09-07 RX ADMIN — PENTAMIDINE ISETHIONATE 300 MG: 300 INHALANT RESPIRATORY (INHALATION) at 09:27

## 2022-09-07 RX ADMIN — ALBUTEROL SULFATE 2.5 MG: 0.83 SOLUTION RESPIRATORY (INHALATION) at 09:27

## 2022-09-07 NOTE — PROGRESS NOTES
Nursing Note  Sofie Rodriguez presents [facility] for: No chief complaint on file.    During today's [facility] appointment, orders from Kaylin Triana were completed.  Frequency: one dose of IVF  Dose 1/2 for Injectafer    Progress note:  Patient identification verified by name and date of birth.  Assessment completed.  Vitals recorded in Doc Flowsheets.  Patient was provided with education regarding medication/procedure and possible side effects.  Patient verbalized understanding.     present during visit today: Not Applicable.    Treatment Conditions: Non-applicable.    Infusion length and rate:  infusion given over approximately IVF over 1 hour, Injectafer over 15 minutes    Labs: were not ordered for this appointment.    Vascular access: peripheral IV was placed by vascular access nurse.    Is the next appt scheduled? Yes; sent to scheduling  Asymptomatic COVID test completed? no    Post Infusion Assessment:  Patient tolerated infusion without incident.     Discharge Plan:   Follow up plan of care with: ongoing infusions at [facility].  Discharge instructions were reviewed with patient.  Patient/representative verbalized understanding of discharge instructions and all questions answered.  Patient discharged from [facility] in stable condition.    Claribel William RN    Administrations This Visit     0.9% sodium chloride BOLUS     Admin Date  09/07/2022 Action  New Bag Dose  1,000 mL Route  Intravenous Administered By  Claribel William RN          ferric carboxymaltose (INJECTAFER) 750 mg in sodium chloride 0.9 % 125 mL intermittent infusion     Admin Date  09/07/2022 Action  New Bag Dose  750 mg Rate  500 mL/hr Route  Intravenous Administered By  Claribel William RN                /79 (BP Location: Left arm)   Pulse 96   Temp 98.6  F (37  C) (Oral)   Resp 18   SpO2  100%

## 2022-09-07 NOTE — TELEPHONE ENCOUNTER
Anemia Management Note - Enrollment  SUBJECTIVE/OBJECTIVE:    Referred by Dr. Claribel Franks on 2022  Primary Diagnosis: Anemia in Chronic Kidney Disease (N18.3, D63.1)   3b  Secondary Diagnosis:  Chronic Kidney Disease, Stage 3 (N18.3)  3b  Lung Tx:  2022  Hgb goal range:  9-10  Epo/Darbo: TBD; TSAT needs to be 20% or > before insurance will approve JOCELINE.   Iron regimen:  Treat with IV Iron.  Labs : 2023  Recent JOCELINE use, transfusion, IV iron: NA  RX/TX plans : TBD    *Staying [facility].      No history of stroke, MI and blood clots or cancers. Hx of AFib     Contact:            No Consent to Communicate on File.   Anemia Latest Ref Rng & Units 2022 2022 2022 2022 2022 9/3/2022 2022   Hemoglobin 11.7 - 15.7 g/dL 8.9(L) 7.6(L) 7.1(L) 6.3(LL) 8.1(L) 8.0(L) 7.7(L)   TSAT 15 - 46 % - 21 - - - - -   Ferritin 11 - 328 ng/mL - 334(H) - - - 343(H) -       BP Readings from Last 3 Encounters:   22 (!) 138/94   22 (!) 146/82   22 (!) 147/73     Wt Readings from Last 2 Encounters:   22 155 lb (70.3 kg)   22 155 lb (70.3 kg)     Current Outpatient Medications   Medication Sig Dispense Refill     acetaminophen (TYLENOL) 160 MG/5ML liquid Take 31.25 mLs (1,000 mg) by mouth 2 times daily 1200 mL 3     alcohol swab prep pads Use to swab area of injection/amos as directed. 100 each 6     aspirin (ASA) 81 MG EC tablet Take 1 tablet (81 mg) by mouth daily 30 tablet 11     blood glucose (CONTOUR NEXT TEST) test strip Use to test blood sugar 4 times daily, as directed. 400 strip 0     blood glucose (NO BRAND SPECIFIED) lancets standard Use to test blood sugar 4 times daily or as directed. 400 lancet 0     blood glucose monitoring (CONTOUR NEXT MONITOR W/DEVICE KIT) meter device kit Use to test blood sugar 4 times daily or as directed. 1 kit 0     calcium carbonate 600 mg-vitamin D 400 units (CALTRATE) 600-400 MG-UNIT per tablet 1 tablet by Per J Tube  route 2 times daily (with meals) 60 tablet 11     furosemide (LASIX) 20 MG tablet Take 1 tablet (20 mg) by mouth 2 times daily 30 tablet 1     insulin aspart (NOVOLOG PEN) 100 UNIT/ML pen Inject 1-12 Units Subcutaneous every 4 hours 15 mL 0     insulin glargine (LANTUS PEN) 100 UNIT/ML pen Inject 7 Units Subcutaneous 2 times daily 15 mL 0     insulin pen needle (31G X 5 MM) 31G X 5 MM miscellaneous Use one needle daily, as directed 100 each 0     Lidocaine (LIDOCARE) 4 % Patch Place 2 patches onto the skin every 24 hours To prevent lidocaine toxicity, patient should be patch free for 12 hrs daily. 30 patch 3     loperamide (IMODIUM A-D) 2 MG tablet Take 1 tablet (2 mg) by mouth 4 times daily as needed for diarrhea 90 tablet 3     metoprolol tartrate (LOPRESSOR) 50 MG tablet 1 tablet (50 mg) by Per Feeding Tube route 2 times daily 60 tablet 11     Multiple Vitamins-Minerals (MULTIVITAMINS W/MINERALS) liquid 15 mLs by Per J Tube route daily 450 mL 11     mycophenolate (GENERIC EQUIVALENT) 200 MG/ML suspension 3.75 mLs (750 mg) by Per J Tube route 2 times daily 225 mL 11     Nutritional Supplements (GLUCOSE MANAGEMENT) TABS Take 3-4 tablets by mouth as needed (hypoglycemia) Pharmacist may change instructions to fit whatever glucose tab product they have in stock. 120 tablet 1     nystatin (MYCOSTATIN) 273195 UNIT/ML suspension Swish and swallow 10 mLs (1,000,000 Units) in mouth 4 times daily 1200 mL 3     ondansetron (ZOFRAN ODT) 4 MG ODT tab Take 1 tablet (4 mg) by mouth every 6 hours as needed for nausea or vomiting 30 tablet 0     oxyCODONE (ROXICODONE) 5 MG tablet 1 tablet (5 mg) by Per J Tube route every 8 hours as needed for breakthrough pain 15 tablet 0     pantoprazole (PROTONIX) 2 mg/mL SUSP suspension 20 mLs (40 mg) by Per J Tube route 2 times daily 1200 mL 11     predniSONE (DELTASONE) 5 MG tablet 2 tablets (10 mg) by Per J Tube route 2 times daily Start the evening of 8/17; Taper to 10 mg twice a day  starting 8/18 with next taper due 9/15. 120 tablet 3     protein modular (PROSOURCE TF) LIQD 1 packet by Per Feeding Tube route daily 30 packet 11     Sharps Container MISC 1 sharps container 1 each 0     simethicone (MYLICON) 40 MG/0.6ML suspension 0.6 mLs (40 mg) by Per Feeding Tube route every 6 hours as needed for cramping or flatulence 45 mL 0     tacrolimus (GENERIC EQUIVALENT) 1 mg/mL suspension 3.5 mLs (3.5 mg) by Per J Tube route 2 times daily 210 mL 11     valGANciclovir (VALCYTE) 50 MG/ML solution 9 mLs (450 mg) by Oral or Feeding Tube route three times a week 118 mL 3     ASSESSMENT:  Hgb Not at goal/Initiation of therapy   Ferritin: At goal (>100ng/mL)  TSat: not at goal of >30%  Iron regimen recommended: Treat with IV Iron.   Recommended JOCELINE regimen: TBD: TSAT must be 20% or > before insurance will approve JOCELINE.  Blood Pressure: Stable    PLAN:  1. Patient called today for enrollment in Anemia Management Service.  2. Discussed:  anemia overview, monitoring service and goal hemoglobin range and rationale and risks of JOCELINE blood clots, stroke and increase in blood pressure  3. Dose location: in clinic [facility]  4. Labs: [facility]  5. Pharmacy: NA        TSAT is 9%.  Treat with IV Iron.  TSAT must be 20% or > before insurance will approve JOCELINE.  Spoke with Sofie. She is staying at the [facility] for a couple more months.  [facility]fer will be given at [facility].   Message sent to the scheduling pool.     Next call date:  9/14/22  Did she schedule Iron?     Sara Azul RN   Anemia Services  [address] [address]   [email or web address]   Office : [phone]  Fax: [phone]

## 2022-09-07 NOTE — TELEPHONE ENCOUNTER
Patient PFT and labs today reviewed by Kaylin Triana and discussed with transplant team.     DSA mfi up, PFTs down.     - request admission for [facility].   - patient scheduled for Chest Ct and labs tomorrow (C1Q assay - immunology paged and notified).   - if patient does not get hospital bed by tomorrow after clinic visit with Kaylin Triana, plan to send patient to ED.     Patient and son updated with plan. Questions answered. Will call back with additional questions, concerns, updates.

## 2022-09-07 NOTE — PROGRESS NOTES
[facility]  Transfer Triage Note    Date of call: 09/07/22  Time of call: 3:36 PM    Current Patient Location: Outpatient - Referral Via RABIA Triana  Current Level of Care: Outpatient    Vitals: None   Diagnosis: Possible antibody mediated rejection  Is COVID-19 a concern? No  Reason for requested transfer: Patient has established care here   Isolation Needs: None    Outside Records: Available  Additional records may be faxed to [phone].    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  Possible [facility] protocol.    6C or 6B private room with no iso, no tele.  Accepted to medicine pending bed availability. If no bed may present to ED      Jannet Aly MD

## 2022-09-07 NOTE — TELEPHONE ENCOUNTER
Called and spoke to patient regarding creatinine level of 1.9, Kaylin Triana wants patient to get 1 L of IV fluid today and recheck labs tomorrow morning before clinic.  Patient states that she already got chest x-ray and PFTs done today, writer will cancel appointments for tomorrow.  We will try to move the labs closer to 1030 with clinic visit with Kaylin at 1120.  Patient states that the last 4 mornings she has coughed up a little blood but has not increased or decreased stayed the same, has not been able to produce a sputum sample she keeps trying.  States that she is tired and she sleeps all the time.  Usually on 1 L of oxygen now on 2 L of oxygen satting low to mid 90s.  Will assess further at clinic appointment tomorrow advised patient to call with any questions or concerns.  1 L of fluid [facility] give [facility] with iron infusion.

## 2022-09-07 NOTE — PROGRESS NOTES
Patient was seen today for a Pentamidine nebulizer tx ordered by Dr. Franks    Patient was first given 2.5 mg of Albuterol via nebulizer, after which Pentamidine 300 mg (Lot # [id number]) mixed with 6cc Sterile H20 was administered through a filtered nebulizer.    Pre-treatment: SpO2 = 97%   HR = 68   BBS = crackles left lower lobe  Post-treatment: SpO2 = 99%  HR = 73  BBS = Still with crackles in left lower lobe.       Lots of coughing with production small amounts of clear with some blood tinged.  Had to terminate treatment after 5 minutes due to the excessive coughing - turned flow meter down to lowest setting but still unable to tolerate.      This service today was provided under the direct supervision of , who was available if needed.     Procedure was completed by Caridad Cisneros.JEFFERY

## 2022-09-07 NOTE — PROGRESS NOTES
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 Post-operative course complicated by bilateral pleural effusions, left radial artery thrombus s/p thrombectomy, BACILIO, C diff, gastroparesis s/p GJ tube placement in IR 7/27, s/p EGD 8/3 with pyloric ulcer, melena with hgb drop , elevated LFTs with extrahepatic mass on US and MRCP with increase in biliary dilation s/p ERCP with findings concerning for hemobilia, but no bleeding from ulcer in pyloric channel. She is awaiting bed for bronchoscopy/BAL and possible treatment for AMR.     1. S/p bilateral sequential lung transplant (BSLT) for end stage COPD:  Persistent hypercapneic respiratory failure:   Bilateral hydroPTX:   Right hemidiaphragm palsy: notes increased dyspnea at night, has had to increase her O2 to 2 L (3 L one night), still using 1L during the day. Cough is not more frequent, but has had 5-6 episodes of bloody mixed sputum since Saturday, typically very early am. Also feels tight, but sounds more like incisional tightness. Sniff 7/14 notable for right hemidiaphragm palsy. EBV 9/1 and CMV 9/7 negative. CT reviewed today and demonstrates bilateral opacities and bilateral effusions, R>L; of note, patient has not been doing BID diuresis, only daily furosemide. PFTs completed yesterday did not meet ATS guidelinesg. Discussed with patient's pulmonologist and plan is to admit patient for surveillance bronchoscopy/BAL (discussed with Dr. Franks today and confirmed no biopsies/C4D staining). Suspect infection vs AMR.  - Diuresis per pulm team upon admission, currently on 20 mg furosemide daily  - Bronch/BAL upon admission  - STAT DSA  from yesterday pending; C1Q assay pending from today     Immunosuppression:  Induction therapy with basiliximab (and high dose IV steroid)  - Tacrolimus 3.5 mg BID (via J tube)  Goal level 8-12  -  mg BID (prior decrease for GI symptoms/leukopenia)   - Prednisone taper per below    Date AM dose (mg) PM dose (mg)   8/18/22 10 10   9/15/22 10 7.5   10/13/22 7.5 7.5   11/10/22 7.5 5   12/8/22 5 5   1/5/23 5 2.5     - Unable to tolerate pentamidine yesterday (dapsone on hold for worsening anemia; bactrim stopped for hyperkalemia); consider resuming dapsone  - VGCV for CMV ppx x 3 months (end on 9/28)  - Nystatin for oral candidiasis ppx X 6 month course      Donor Recipient Intervention   CMV status Positive Positive Valganciclovir POD #8-90   EBV status Positive Positive EBV monitoring as below   HSV status N/A Positive Not indicated       2. Positive DSA: increasing with last + DQB2 up to 7033 from 3584). Given PFTs and rising MFI, concern for AMR.  - STAT DSA from yesterday pending     3. H/o M. peregrinum colonization: no growth post-transplant.  - AFB to be sent with BAL tomorrow    5. Hypogammaglobulinemia: s/p IVIG dosing with premedication 7/30. IgG of 672 on 8/29.  - Repeat IgG ~ 9/29     6. A flutter with RVR/SVT: no current complaints. AC deferred per EP given bleeding risk and despite CHADSVASc of 2. Completed Zio Patch yesterday and mailed it in.   - Continue metoprolol  - Zio patch read pending, will need EP follow up     7. H/o HTN:  H/o HFpEF: vasoplegia post operatively. Last echo 7/7/22 normal EF with good LV function. S/p hydrocortisone 7/6-7/9 and fludrocortisone 7/6-7/11 without significant improvement.   - Continue metoprolol 50 mg BID     11. BACILIO: approaching CKD with stable elevation of Cr at 1.56. Multifactorial including medications (Bactrim, tacrolimus) and hypotension. iHD 7/14-7/16. Received IL IVF yesterday with improvement, however, will balance fluids vs diuresis.  - Tacrolimus  monitoring as above  - On lasix 20 mg daily for now    12. Extrahepatic and intrahepatic biliary dilation: abdominal US  with extrahepatic mass at level of common bile duct with associated intrahepatic and common bile duct dilation, patent hepatic vasculature, and layering gallbladder sludge. MRCP 8/9 with slight increase in moderate biliary dilation and gall bladder with moderate protein/hemorrhagic material. S/p ERCP 8/11 with findings concerning for hemobilia, stent placed across duodenum and in CBD. CTA abdomen 8/11 without evidence of acute GI bleed. GI team concerned bleeding originating from gallbladder. LFTs 9/7 WNL. Progressive decline in hgb per below  - May need to move up EGD scheduled on 10/13 or consider CT abdomen  (currently set for 6 months for pancreatic findings ~2/2023)     13. IPMN: MRCP 8/9 showed cystic foci in the pancreatic head measuring 4 x 3 mm superiorly and 4 x 3 mm inferiorly.   - Follow up imaging in 6 months to 1 year    14. Severe gastroparesis:   Pyloric ulcer: CT abdomen 7/15 with moderate to large gastric distention, otherwise without obstruction. Gastric emptying study 7/20 with severe gastric emptying delay (95% retention at 4 hours), s/p GJ tube placement in IR 7/27. S/p EGD 8/3 for coffee ground G tube output, noted to have pyloric ulcer and excessive gastric fluid and residual food. Hemoglobin drop with dark tarry stools 8/8.  S/p repeat EGD 8/11 with mild erythema 2/2 GJ tube trauma seen near insertion site but no active bleeding.   - PPI BID  - TF are continuous and ALL medications via J tube  - Repeat EGD 10/13 to check healing of ulcer, consider repeat EGD sooner given recurrent slow decline in hgb     15. Post op pain management: pain level is between 4-5/10, mainly in the am or if she sits too long. Lidocaine patches were not effective. Using oxycodone intermittently, last use was on Saturday 9/3.  - Recommend Tylenol 1000 mg BID, continue heating packs; oxycodone  PRN    16. Acute on chronic anemia: s/p 1 U PRBC on 9/1 with improvement in hgb to 8.1, now slowly trending down again. Ferritin elevated, iron and sat index WNL. B12 WNL. Retic % slightly elevated at 2.6, absolute RC WNL, haptoglobin elevated without evidence of active hemolysis and peripheral smear without polychromasia and macrocytosis which was known, no schistos. Unlikely to have been dapsone, although on hold. Thought secondary to chronic illness and BM suppression from meds, however, given recurrent decline, bleeding source should be evaluated.  - Consider moving up EGD and/or CTA abdomen if concern for gallbladder bleeding  - FOBT ordered but never collected, given liquid stools     17. Stress-induced/steroid induced hyperglycemia: no further lows since the feeding tube has been working/unclogged, using very infrequent novolog.   - Continue Lantus and sliding scale insulin    18. Diarrhea: with h/o C diff. Has been using imodium 1-2 times/day, but that has not been helping.  - Recommend repeat stool studies upon admission    Not discussed:  1. H/o hepatitis C     Signed out to triage, awaiting bed.      Kaylin Triana PA-C  Pulmonary, Allergy, Critical Care and Sleep Medicine        Interval History:     Patient notes worsening shortness of breath at night, had to go up to 2 L, did have one one night where she needed 3 L at night (was on 1L prior), couldn't catch her breath. Also notes she has coughed up some blood at night, first time was Saturday morning, has happened 5-6 times since then, usually early morning when she is sleeping. No hemoptysis, notes it's blood mixed with mucous. Last episode was early this am. No fever or chills, no sinus or nasal congestion, no post nasal drainage. Cough is overall mild, more clearing of her throat. Notes some tightness in her whole chest, denies shortness of breath currently, cannot expand to take a deep breath. Currently on 1L at 99%. No chest pain or palpitations.  Nausea with both am/pm medications, taking Zofran, but it's not too helpful. Taking it more for the constipation side effect. No abdominal pain. Stools are pure liquid for the last few weeks (had two stools a few weeks ago that were slightly formed, but nothing since that time), brown, no red, maroon or black stools. TF are 24 hours per day 35 ml/hour, all meds through her tube, very rarely eating.           Review of Systems:   Please see HPI, otherwise the complete 10 point ROS is negative.           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     Hepatitis 2017    Hep C, [facility]     HTN (hypertension)      Osteopenia      Past Surgical History:   Procedure Laterality Date     BRONCHOSCOPY (RIGID OR FLEXIBLE), DIAGNOSTIC N/A 8/2/2022    Procedure: BRONCHOSCOPY, DIAGNOSTIC- inspection Bronch;  Surgeon: Kamala Lovell MD;  Location:  GI     BRONCHOSCOPY FLEXIBLE AND RIGID N/A 07/19/2022    Procedure: BRONCHOSCOPY inspection only;  Surgeon: Bob Liao MD;  Location:  GI     COLONOSCOPY  2015     CV CORONARY ANGIOGRAM N/A 06/30/2021    Procedure: CV CORONARY ANGIOGRAM;  Surgeon: Alexander Cuellar MD;  Location:  HEART CARDIAC CATH LAB     CV RIGHT HEART CATH MEASUREMENTS RECORDED N/A 06/30/2021    Procedure: CV RIGHT HEART CATH;  Surgeon: Alexander Cuellar MD;  Location:  HEART CARDIAC CATH LAB     ENDOSCOPIC RETROGRADE CHOLANGIOPANCREATOGRAM N/A 8/11/2022    Procedure: ENDOSCOPIC RETROGRADE CHOLANGIOPANCREATOGRAPHY WITH PANCREATIC DUCT NEEDLE KNIFE AND STENT PLACEMENT, BILE DUCT SPHINCTEROTOMY, BLOOD/DEBRIS REMOVAL AND STENT PLACEMENT;  Surgeon: Cosmo Arroyo MD;  Location:  OR     ENT SURGERY  1974    tonsillectomy     ENTEROSCOPY SMALL BOWEL N/A 8/11/2022    Procedure: SMALL  BOWEL ENTEROSCOPY;  Surgeon: Cosmo Arroyo MD;  Location: [facility] OR     ESOPHAGOGASTRODUODENOSCOPY, WITH NASOGASTRIC TUBE INSERTION N/A 2022    Procedure: ESOPHAGOGASTRODUODENOSCOPY, WITH NASOJEJUNAL TUBE INSERTION;  Surgeon: Ozzy Nickerson MD;  Location:  GI     ESOPHAGOSCOPY, GASTROSCOPY, DUODENOSCOPY (EGD), COMBINED N/A 8/3/2022    Procedure: ESOPHAGOGASTRODUODENOSCOPY (EGD);  Surgeon: Ira Andres MD;  Location:  GI     HAND SURGERY       IR GASTRO JEJUNOSTOMY TUBE CHANGE  2022     IR GASTRO JEJUNOSTOMY TUBE PLACEMENT  2022     IR THORACENTESIS  2022     LEEP TX, CERVICAL  2017    HECTOR III     LYMPH NODE BIOPSY Left     Left axilla, benign- [place]     MIDLINE INSERTION - DOUBLE LUMEN Left 2022    20cm, Basilic vein     THORACENTESIS Left 2022    Procedure: THORACENTESIS;  Surgeon: Bo Capone PA-C;  Location: [facility] OR     THROMBECTOMY UPPER EXTREMITY Left 2022    Procedure: LEFT RADIAL ARM THROMBECTOMY;  Surgeon: Christie Graham MD;  Location:  OR     TRANSPLANT LUNG RECIPIENT SINGLE X2 Bilateral 2022    Procedure: Clamshell Incision, Bilateral Sequential Lung Transplant, On Cardiopulmonary Bypass, Flexible Bronchoscopy;  Surgeon: Sue Sunshine MD;  Location:  OR           Family History:     No family history on file.         Social History:     Social History     Socioeconomic History     Marital status: Single     Spouse name: Not on file     Number of children: Not on file     Years of education: Not on file     Highest education level: Not on file   Occupational History     Not on file   Tobacco Use     Smoking status: Former Smoker     Years: 30.00     Types: Cigarettes     Quit date: 2020     Years since quittin.8     Smokeless tobacco: Never Used   Substance and Sexual Activity     Alcohol use: Not Currently     Drug use: Not Currently     Types: Marijuana, Methamphetamines      Comment: hx:marijuana and methamphetamine-quit both unsure ?  2-3 yrs ago     Sexual activity: Not on file   Other Topics Concern     Parent/sibling w/ CABG, MI or angioplasty before 65F 55M? Not Asked   Social History Narrative     Not on file     Social Determinants of Health     Financial Resource Strain: Not on file   Food Insecurity: Not on file   Transportation Needs: Not on file   Physical Activity: Not on file   Stress: Not on file   Social Connections: Not on file   Intimate Partner Violence: Not on file   Housing Stability: Not on file            Medications:     Current Outpatient Medications   Medication     acetaminophen (TYLENOL) 160 MG/5ML liquid     alcohol swab prep pads     aspirin (ASA) 81 MG EC tablet     blood glucose (CONTOUR NEXT TEST) test strip     blood glucose (NO BRAND SPECIFIED) lancets standard     blood glucose monitoring (CONTOUR NEXT MONITOR W/DEVICE KIT) meter device kit     calcium carbonate 600 mg-vitamin D 400 units (CALTRATE) 600-400 MG-UNIT per tablet     furosemide (LASIX) 20 MG tablet     insulin aspart (NOVOLOG PEN) 100 UNIT/ML pen     insulin glargine (LANTUS PEN) 100 UNIT/ML pen     insulin pen needle (31G X 5 MM) 31G X 5 MM miscellaneous     loperamide (IMODIUM A-D) 2 MG tablet     metoprolol tartrate (LOPRESSOR) 50 MG tablet     Multiple Vitamins-Minerals (MULTIVITAMINS W/MINERALS) liquid     mycophenolate (GENERIC EQUIVALENT) 200 MG/ML suspension     Nutritional Supplements (GLUCOSE MANAGEMENT) TABS     nystatin (MYCOSTATIN) 581281 UNIT/ML suspension     ondansetron (ZOFRAN ODT) 4 MG ODT tab     oxyCODONE (ROXICODONE) 5 MG tablet     pantoprazole (PROTONIX) 2 mg/mL SUSP suspension     predniSONE (DELTASONE) 5 MG tablet     protein modular (PROSOURCE TF) LIQD     [employer]ps Container MISC     tacrolimus (GENERIC EQUIVALENT) 1 mg/mL suspension     valGANciclovir (VALCYTE) 50 MG/ML solution     No current facility-administered medications for this visit.            Physical  Exam:   /71   Pulse 70   SpO2 99%     GENERAL: alert, mildly ill appearing in WC  HEENT: NCAT, EOMI, no scleral icterus, oral mucosa moist, mild yellowish plaque on back of tongue  Neck: no cervical or supraclavicular adenopathy  Lungs: moderate airflow, decreased in bases L>R, very few scattered basilar crackles, but mainly clear  CV: RRR, S1S2, no murmurs noted  Abdomen: normoactive BS, soft  Lymph: trace BLE, L>R  Neuro: AAO X 3, CN 2-12 grossly intact  Psychiatric: normal affect, good eye contact  Skin: no rash, jaundice or lesions on limited exam         Data:   All laboratory and imaging data reviewed.      Recent Results (from the past 168 hour(s))   Bld morphology pathology review    Collection Time: 09/01/22  5:26 PM   Result Value Ref Range    Final Diagnosis       Peripheral Blood Smear:  -Marked normochromic, macrocytic anemia; borderline increased erythrocyte regeneration; very rare spherocytes  -Lymphocytopenia      Comment       The significance of very rare spherocytes is unclear as this overlaps with what is sometimes seen in normal individuals or due to processing.        Clinical Information       From Epic electronic medical record; 62-year-old female is status post lung transplant on 6/28/2022 for end-stage COPD. Peripheral smear review requested for low hemoglobin.      Peripheral Smear       The red blood cells appear normochromic with rare hypochromic red blood cells.  Poikilocytosis includes very rare spherocytes.  Polychromasia is present but not definitively increased.  Rouleaux formation is not increased.   The morphology of the platelets is normal.        Peripheral Hematologic Data       CBC WITH DIFFERENTIAL(09/01/2022 09:20 AM CDT):     RESULT VALUE REF. RANGE UNITS   WBC Count   Hemoglobin    Hematocrit   Platelet Count   RBC Count    MCV   MCH   MCHC    RDW  9.0 (NORMAL)     6.3  ( LL )      21.4  ( L )  235 (NORMAL)   2.11  ( L )        101  ( H )     29.9 (NORMAL)       29.4  ( L )      18.2  ( H ) 4.0-11.0  11.7-15.7  35.0-47.0  150-450  3.80-5.20    26.5-33.0  31.5-36.5  10.0-15.0 10e3/uL  g/dL  %  10e3/uL  10e6/uL  fL  pg  g/dL  %   % Neutrophils  % Lymphocytes  % Monocytes  % Eosinophils  % Basophils  % Immature Granulocytes  Absolute Neutrophils   Absolute Lymphocytes   Absolute Monocytes  Absolute Eosinophils  Absolute Basophils  Absolute Immature Granulocytes  NRBCs per 100 WBC  Absolute NRBCs 87  3  7  1  0  2  7.9 (NORMAL)   0.2  ( L )  0.6 (NORMAL)  0.1 (NORMAL)  0.0 (NORMAL)  0.2 (NORMAL)  0 (NORMAL)  0.0 () N/A  N/A  N/A  N/A  N/A  N/A  1.6-8.3  0.8-5.3  0.0-1.3  0.0-0.7  0.0-0.2  <=0.4  <1  <=0.0 %  %  %  %  %  %  10e3/uL  10e3/uL  10e3/uL  10e3/uL  10e3/uL  10e3/uL  /100  10e3/uL     RETICULOCYTE COUNT (09/01/2022 09:20 AM CDT):  RESULT VALUE REF. RANGE UNITS    % Reticulocyte    Absolute Reticulocyte   4.7  ( H )   0.099  ( H ) 0.5-2.0  0.025-0.095 %  10e6/uL           Performing Labs       The technical component of this testing was completed at [facility] and [facility]     Basic metabolic panel    Collection Time: 09/02/22 10:01 AM   Result Value Ref Range    Creatinine 1.34 (H) 0.51 - 0.95 mg/dL    Sodium 147 (H) 136 - 145 mmol/L    Potassium 3.7 3.4 - 5.3 mmol/L    Urea Nitrogen 51.8 (H) 8.0 - 23.0 mg/dL    Chloride 100 98 - 107 mmol/L    Carbon Dioxide (CO2) 36 (H) 22 - 29 mmol/L    Anion Gap 11 7 - 15 mmol/L    Glucose 123 (H) 70 - 99 mg/dL    GFR Estimate 45 (L) >60 mL/min/1.73m2    Calcium 9.2 8.8 - 10.2 mg/dL   Magnesium    Collection Time: 09/02/22 10:01 AM   Result Value Ref Range    Magnesium 2.4 (H) 1.7 - 2.3 mg/dL   CBC with platelets    Collection Time: 09/02/22 10:01 AM   Result Value Ref Range    WBC Count 9.5 4.0 - 11.0 10e3/uL    RBC Count 2.72 (L) 3.80 - 5.20 10e6/uL    Hemoglobin 8.1 (L) 11.7 - 15.7 g/dL    Hematocrit 26.7 (L) 35.0 - 47.0 %    MCV 98 78 - 100 fL    MCH 29.8 26.5 -  33.0 pg    MCHC 30.3 (L) 31.5 - 36.5 g/dL    RDW 20.1 (H) 10.0 - 15.0 %    Platelet Count 222 150 - 450 10e3/uL   Basic metabolic panel    Collection Time: 09/03/22 10:39 AM   Result Value Ref Range    Creatinine 1.71 (H) 0.51 - 0.95 mg/dL    Sodium 144 136 - 145 mmol/L    Potassium 4.5 3.4 - 5.3 mmol/L    Urea Nitrogen 63.9 (H) 8.0 - 23.0 mg/dL    Chloride 96 (L) 98 - 107 mmol/L    Carbon Dioxide (CO2) 38 (H) 22 - 29 mmol/L    Anion Gap 10 7 - 15 mmol/L    Glucose 161 (H) 70 - 99 mg/dL    GFR Estimate 34 (L) >60 mL/min/1.73m2    Calcium 9.4 8.8 - 10.2 mg/dL   CBC with platelets    Collection Time: 09/03/22 10:39 AM   Result Value Ref Range    WBC Count 10.6 4.0 - 11.0 10e3/uL    RBC Count 2.71 (L) 3.80 - 5.20 10e6/uL    Hemoglobin 8.0 (L) 11.7 - 15.7 g/dL    Hematocrit 27.3 (L) 35.0 - 47.0 %     (H) 78 - 100 fL    MCH 29.5 26.5 - 33.0 pg    MCHC 29.3 (L) 31.5 - 36.5 g/dL    RDW 18.8 (H) 10.0 - 15.0 %    Platelet Count 230 150 - 450 10e3/uL   Ferritin    Collection Time: 09/03/22 10:39 AM   Result Value Ref Range    Ferritin 343 (H) 11 - 328 ng/mL   Iron & Iron Binding Capacity    Collection Time: 09/03/22 10:39 AM   Result Value Ref Range    Iron 21 (L) 37 - 145 ug/dL    Iron Sat Index 9 (L) 15 - 46 %    Iron Binding Capacity 233 (L) 240 - 430 ug/dL   Hepatic panel    Collection Time: 09/07/22  8:58 AM   Result Value Ref Range    Protein Total 6.0 (L) 6.4 - 8.3 g/dL    Albumin 3.4 (L) 3.5 - 5.2 g/dL    Bilirubin Total 0.2 <=1.2 mg/dL    Alkaline Phosphatase 98 35 - 104 U/L    AST 16 10 - 35 U/L    ALT 14 10 - 35 U/L    Bilirubin Direct <0.20 0.00 - 0.30 mg/dL   Basic metabolic panel    Collection Time: 09/07/22  8:58 AM   Result Value Ref Range    Creatinine 1.90 (H) 0.51 - 0.95 mg/dL    Sodium 144 136 - 145 mmol/L    Potassium 3.8 3.4 - 5.3 mmol/L    Urea Nitrogen 90.3 (H) 8.0 - 23.0 mg/dL    Chloride 93 (L) 98 - 107 mmol/L    Carbon Dioxide (CO2) 43 (H) 22 - 29 mmol/L    Anion Gap 8 7 - 15 mmol/L     Glucose 87 70 - 99 mg/dL    GFR Estimate 30 (L) >60 mL/min/1.73m2    Calcium 9.4 8.8 - 10.2 mg/dL   Magnesium    Collection Time: 09/07/22  8:58 AM   Result Value Ref Range    Magnesium 2.7 (H) 1.7 - 2.3 mg/dL   CMV DNA quantification    Collection Time: 09/07/22  8:58 AM    Specimen: Arm, Right; Blood   Result Value Ref Range    CMV DNA IU/mL Not Detected Not Detected IU/mL   Tacrolimus by Tandem Mass Spectrometry    Collection Time: 09/07/22  8:58 AM   Result Value Ref Range    Tacrolimus by Tandem Mass Spectrometry 8.3 5.0 - 15.0 ug/L    Tacrolimus Last Dose Date      Tacrolimus Last Dose Time     CBC with platelets and differential    Collection Time: 09/07/22  8:58 AM   Result Value Ref Range    WBC Count 7.7 4.0 - 11.0 10e3/uL    RBC Count 2.54 (L) 3.80 - 5.20 10e6/uL    Hemoglobin 7.7 (L) 11.7 - 15.7 g/dL    Hematocrit 26.2 (L) 35.0 - 47.0 %     (H) 78 - 100 fL    MCH 30.3 26.5 - 33.0 pg    MCHC 29.4 (L) 31.5 - 36.5 g/dL    RDW 18.4 (H) 10.0 - 15.0 %    Platelet Count 285 150 - 450 10e3/uL    % Neutrophils 81 %    % Lymphocytes 4 %    % Monocytes 10 %    % Eosinophils 2 %    % Basophils 0 %    % Immature Granulocytes 3 %    NRBCs per 100 WBC 0 <1 /100    Absolute Neutrophils 6.3 1.6 - 8.3 10e3/uL    Absolute Lymphocytes 0.3 (L) 0.8 - 5.3 10e3/uL    Absolute Monocytes 0.8 0.0 - 1.3 10e3/uL    Absolute Eosinophils 0.1 0.0 - 0.7 10e3/uL    Absolute Basophils 0.0 0.0 - 0.2 10e3/uL    Absolute Immature Granulocytes 0.2 <=0.4 10e3/uL    Absolute NRBCs 0.0 10e3/uL   General PFT Lab (Please always keep checked)    Collection Time: 09/07/22  9:01 AM   Result Value Ref Range    FVC-Pred 3.06 L    FVC-Pre 1.01 L    FVC-%Pred-Pre 33 %    FEV1-Pre 0.98 L    FEV1-%Pred-Pre 40 %    FEV1FVC-Pred 79 %    FEV1FVC-Pre 97 %    FEFMax-Pred 6.14 L/sec    FEFMax-Pre 4.07 L/sec    FEFMax-%Pred-Pre 66 %    FEF2575-Pred 2.22 L/sec    FEF2575-Pre 2.90 L/sec    [id number]-%Pred-Pre 130 %    ExpTime-Pre 5.54 sec    FIFMax-Pre 3.12  L/sec    FEV1FEV6-Pred 81 %    FEV1FEV6-Pre 98 %   Basic metabolic panel    Collection Time: 09/08/22  7:37 AM   Result Value Ref Range    Creatinine 1.56 (H) 0.51 - 0.95 mg/dL    Sodium 145 136 - 145 mmol/L    Potassium 3.7 3.4 - 5.3 mmol/L    Urea Nitrogen 82.3 (H) 8.0 - 23.0 mg/dL    Chloride 96 (L) 98 - 107 mmol/L    Carbon Dioxide (CO2) 40 (H) 22 - 29 mmol/L    Anion Gap 9 7 - 15 mmol/L    Glucose 177 (H) 70 - 99 mg/dL    GFR Estimate 38 (L) >60 mL/min/1.73m2    Calcium 9.4 8.8 - 10.2 mg/dL     PFT interpretation:  Maneuver: valid, but did not meet ATS guidelines

## 2022-09-07 NOTE — LETTER
9/7/2022         RE: Sofie Rodriguez  [address] [place] [address]        Dear Colleague,    Thank you for referring your patient, Sofie Rodriguez, to the [facility]. Please see a copy of my visit note below.    Nursing Note  Sofie Rodriguez presents [facility] for: No chief complaint on file.    During today's [facility] appointment, orders from Kaylin Triana were completed.  Frequency: one dose of IVF  Dose 1/2 for Injectafer    Progress note:  Patient identification verified by name and date of birth.  Assessment completed.  Vitals recorded in Doc Flowsheets.  Patient was provided with education regarding medication/procedure and possible side effects.  Patient verbalized understanding.     present during visit today: Not Applicable.    Treatment Conditions: Non-applicable.    Infusion length and rate:  infusion given over approximately IVF over 1 hour, Injectafer over 15 minutes    Labs: were not ordered for this appointment.    Vascular access: peripheral IV was placed by vascular access nurse.    Is the next appt scheduled? Yes; sent to scheduling  Asymptomatic COVID test completed? no    Post Infusion Assessment:  Patient tolerated infusion without incident.     Discharge Plan:   Follow up plan of care with: ongoing infusions at [facility].  Discharge instructions were reviewed with patient.  Patient/representative verbalized understanding of discharge instructions and all questions answered.  Patient discharged from [facility] in stable condition.    Claribel William RN    Administrations This Visit     0.9% sodium chloride BOLUS     Admin Date  09/07/2022 Action  New Bag Dose  1,000 mL Route  Intravenous Administered By  Claribel William RN          ferric carboxymaltose (INJECTAFER) 750 mg in sodium chloride 0.9 %  125 mL intermittent infusion     Admin Date  09/07/2022 Action  New Bag Dose  750 mg Rate  500 mL/hr Route  Intravenous Administered By  Claribel William RN                /79 (BP Location: Left arm)   Pulse 96   Temp 98.6  F (37  C) (Oral)   Resp 18   SpO2 100%         Again, thank you for allowing me to participate in the care of your patient.        Sincerely,        [facility]

## 2022-09-08 ENCOUNTER — LAB REQUISITION (OUTPATIENT)
Dept: LAB | Facility: CLINIC | Age: 62
DRG: 205 | End: 2022-09-08
Payer: MEDICARE

## 2022-09-08 ENCOUNTER — ANCILLARY PROCEDURE (OUTPATIENT)
Dept: CT IMAGING | Facility: CLINIC | Age: 62
End: 2022-09-08
Attending: PHYSICIAN ASSISTANT
Payer: MEDICARE

## 2022-09-08 ENCOUNTER — OFFICE VISIT (OUTPATIENT)
Dept: PULMONOLOGY | Facility: CLINIC | Age: 62
End: 2022-09-08
Attending: PHYSICIAN ASSISTANT
Payer: MEDICARE

## 2022-09-08 ENCOUNTER — LAB (OUTPATIENT)
Dept: LAB | Facility: CLINIC | Age: 62
End: 2022-09-08
Payer: MEDICARE

## 2022-09-08 VITALS — HEART RATE: 70 BPM | SYSTOLIC BLOOD PRESSURE: 123 MMHG | OXYGEN SATURATION: 99 % | DIASTOLIC BLOOD PRESSURE: 71 MMHG

## 2022-09-08 DIAGNOSIS — R79.89 ELEVATED SERUM CREATININE: ICD-10-CM

## 2022-09-08 DIAGNOSIS — R60.9 SWELLING: ICD-10-CM

## 2022-09-08 DIAGNOSIS — Z94.2 S/P LUNG TRANSPLANT (H): ICD-10-CM

## 2022-09-08 DIAGNOSIS — D84.9 IMMUNOSUPPRESSED STATUS (H): ICD-10-CM

## 2022-09-08 LAB
ANION GAP SERPL CALCULATED.3IONS-SCNC: 9 MMOL/L (ref 7–15)
BUN SERPL-MCNC: 82.3 MG/DL (ref 8–23)
CALCIUM SERPL-MCNC: 9.4 MG/DL (ref 8.8–10.2)
CHLORIDE SERPL-SCNC: 96 MMOL/L (ref 98–107)
CREAT SERPL-MCNC: 1.56 MG/DL (ref 0.51–0.95)
DEPRECATED HCO3 PLAS-SCNC: 40 MMOL/L (ref 22–29)
DONOR IDENTIFICATION: NORMAL
DQB2: 5744
DSA COMMENTS: NORMAL
DSA PRESENT: YES
DSA TEST METHOD: NORMAL
EBV DNA COPIES/ML, INSTRUMENT: 1374 COPIES/ML
EBV DNA SPEC NAA+PROBE-LOG#: 3.1 {LOG_COPIES}/ML
GFR SERPL CREATININE-BSD FRML MDRD: 38 ML/MIN/1.73M2
GLUCOSE SERPL-MCNC: 177 MG/DL (ref 70–99)
ORGAN: NORMAL
POTASSIUM SERPL-SCNC: 3.7 MMOL/L (ref 3.4–5.3)
SA 1 CELL: NORMAL
SA 1 TEST METHOD: NORMAL
SA 2 CELL: NORMAL
SA 2 TEST METHOD: NORMAL
SA1 HI RISK ABY: NORMAL
SA1 MOD RISK ABY: NORMAL
SA2 HI RISK ABY: NORMAL
SA2 MOD RISK ABY: NORMAL
SODIUM SERPL-SCNC: 145 MMOL/L (ref 136–145)
UNACCEPTABLE ANTIGENS: NORMAL
UNOS CPRA: 37
ZZZSA 1  COMMENTS: NORMAL
ZZZSA 2 COMMENTS: NORMAL

## 2022-09-08 PROCEDURE — 80048 BASIC METABOLIC PNL TOTAL CA: CPT | Performed by: PATHOLOGY

## 2022-09-08 PROCEDURE — 36415 COLL VENOUS BLD VENIPUNCTURE: CPT | Performed by: PATHOLOGY

## 2022-09-08 PROCEDURE — 84999 UNLISTED CHEMISTRY PROCEDURE: CPT | Performed by: INTERNAL MEDICINE

## 2022-09-08 PROCEDURE — 71250 CT THORAX DX C-: CPT | Mod: GC | Performed by: RADIOLOGY

## 2022-09-08 PROCEDURE — 86832 HLA CLASS I HIGH DEFIN QUAL: CPT | Mod: ORL | Performed by: INTERNAL MEDICINE

## 2022-09-08 PROCEDURE — 84999 UNLISTED CHEMISTRY PROCEDURE: CPT | Mod: ORL | Performed by: INTERNAL MEDICINE

## 2022-09-08 PROCEDURE — 99215 OFFICE O/P EST HI 40 MIN: CPT | Mod: 24 | Performed by: PHYSICIAN ASSISTANT

## 2022-09-08 PROCEDURE — 86833 HLA CLASS II HIGH DEFIN QUAL: CPT | Performed by: INTERNAL MEDICINE

## 2022-09-08 RX ORDER — FUROSEMIDE 20 MG
20 TABLET ORAL DAILY
Qty: 30 TABLET | Refills: 1 | Status: CANCELLED | COMMUNITY
Start: 2022-09-08

## 2022-09-08 RX ORDER — FUROSEMIDE 20 MG
20 TABLET ORAL DAILY
Qty: 30 TABLET | Refills: 1 | COMMUNITY
Start: 2022-09-08 | End: 2022-10-08

## 2022-09-08 NOTE — NURSING NOTE
Transplant Coordinator Note     Reason for visit: Post lung transplant follow up visit   Coordinator: Present   Caregiver: Son    Health concerns addressed today:  1. Resp: Requiring more oxygen at night (2-3L), couldn't catch her breath. 5-6 days has coughed up a small amount of blood mixed with sputum each day in the morning.   2. Wasn't able to tolerate pentamidine neb, coughed throughout.   3. GI: nauseous with AM and PM meds especially. Zofran not helping much. Liquid diarrhea which is worse.   4. Pt on aspirin, in patient team notified.     Activity/rehab: pulmonary rehab   Oxygen needs: 1L O2, 2L at night.  Pain management/RX: Incisional pain- feels tight. Sharp pains at times. Use your tylenol as needed.   Diabetic management: on insulin/lantus  Next Bronch due: POD 60  Risk Criteria Labs: negative 7/28/22  CMV status: D+/R+  Valcyte stopped: POD 8-90  EBV status: D+/R+  AC/asa:  On ASA 81mg  PJP prophylactic: Dapsone     COVID:  1. COVID-19 infection (yes/no, date of most recent positive test):   2. Status/instructions given about COVID-19 vaccine: Vaccinated, booster x2 last 3/21/22. Received Evusheld 8/24/2022. Next due 2/24/2023     Pt Education: medications (use/dose/side effects), how/when to call coordinator, frequency of labs, s/s of infection/rejection, call prior to starting any new medications, lab/vital sign book     Health Maintenance:     Last colonoscopy:     Next colonoscopy due:     Dermatology:    Vaccinations this visit:      Labs, CXR, PFTs reviewed with patient  Medication record reviewed and reconciled  Questions and concerns addressed     Patient Instructions  1. Continue to hydrate with 60-70 oz fluids daily.  2. Continue to exercise daily or most days of the week.  3. It doesn't seem like the COVID vaccine is working well in lung transplant patients. A number of lung transplant patients have gotten sick with COVID even after receiving the vaccines. Based on our recent experience, it  can be life-threatening to get COVID even after being vaccinated. Please continue to act like you did not get the COVID vaccine - social distancing, wearing a mask, good hand hygiene, etc. If the people around you are vaccinated, it will help reduce the risk of you getting COVID. All members of your household should be vaccinated.  4. Admit to hospital soon.     Next transplant clinic appointment: TBD  Next lab draw: TBD    AVS printed at time of check out

## 2022-09-08 NOTE — NURSING NOTE
Chief Complaint   Patient presents with     Follow Up     ltx     Vitals were taken and medications were reconciled.     YG Rothman

## 2022-09-08 NOTE — LETTER
9/8/2022         RE: Sofie Rodriguez  [address] [place] [address]        Dear Colleague,    Thank you for referring your patient, Sofie Rodriguez, to the [facility] [place]. Please see a copy of my visit note below.    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 Post-operative course complicated by bilateral pleural effusions, left radial artery thrombus s/p thrombectomy, BACILIO, C diff, gastroparesis s/p GJ tube placement in IR 7/27, s/p EGD 8/3 with pyloric ulcer, melena with hgb drop , elevated LFTs with extrahepatic mass on US and MRCP with increase in biliary dilation s/p ERCP with findings concerning for hemobilia, but no bleeding from ulcer in pyloric channel. She is awaiting bed for bronchoscopy/BAL and possible treatment for AMR.     1. S/p bilateral sequential lung transplant (BSLT) for end stage COPD:  Persistent hypercapneic respiratory failure:   Bilateral hydroPTX:   Right hemidiaphragm palsy: notes increased dyspnea at night, has had to increase her O2 to 2 L (3 L one night), still using 1L during the day. Cough is not more frequent, but has had 5-6 episodes of bloody mixed sputum since Saturday, typically very early am. Also feels tight, but sounds more like incisional tightness. Sniff 7/14 notable for right hemidiaphragm palsy. EBV 9/1 and CMV 9/7 negative. CT reviewed today and demonstrates bilateral opacities and bilateral effusions, R>L; of note, patient has not been doing BID diuresis, only daily furosemide. PFTs completed yesterday did not meet ATS guidelinesg. Discussed with patient's  pulmonologist and plan is to admit patient for surveillance bronchoscopy/BAL (discussed with Dr. Franks today and confirmed no biopsies/C4D staining). Suspect infection vs AMR.  - Diuresis per pulm team upon admission, currently on 20 mg furosemide daily  - Bronch/BAL upon admission  - STAT DSA from yesterday pending; C1Q assay pending from today     Immunosuppression:  Induction therapy with basiliximab (and high dose IV steroid)  - Tacrolimus 3.5 mg BID (via J tube)  Goal level 8-12  -  mg BID (prior decrease for GI symptoms/leukopenia)   - Prednisone taper per below    Date AM dose (mg) PM dose (mg)   8/18/22 10 10   9/15/22 10 7.5   10/13/22 7.5 7.5   11/10/22 7.5 5   12/8/22 5 5   1/5/23 5 2.5     - Unable to tolerate pentamidine yesterday (dapsone on hold for worsening anemia; bactrim stopped for hyperkalemia); consider resuming dapsone  - VGCV for CMV ppx x 3 months (end on 9/28)  - Nystatin for oral candidiasis ppx X 6 month course      Donor Recipient Intervention   CMV status Positive Positive Valganciclovir POD #8-90   EBV status Positive Positive EBV monitoring as below   HSV status N/A Positive Not indicated       2. Positive DSA: increasing with last + DQB2 up to 7033 from 3584). Given PFTs and rising MFI, concern for AMR.  - STAT DSA from yesterday pending     3. H/o M. peregrinum colonization: no growth post-transplant.  - AFB to be sent with BAL tomorrow    5. Hypogammaglobulinemia: s/p IVIG dosing with premedication 7/30. IgG of 672 on 8/29.  - Repeat IgG ~ 9/29     6. A flutter with RVR/SVT: no current complaints. AC deferred per EP given bleeding risk and despite CHADSVASc of 2. Completed Zio Patch yesterday and mailed it in.   - Continue metoprolol  - Zio patch read pending, will need EP follow up     7. H/o HTN:  H/o HFpEF: vasoplegia post operatively. Last echo 7/7/22 normal EF with good LV function. S/p hydrocortisone 7/6-7/9 and fludrocortisone 7/6-7/11 without significant improvement.    - Continue metoprolol 50 mg BID     11. BACILIO: approaching CKD with stable elevation of Cr at 1.56. Multifactorial including medications (Bactrim, tacrolimus) and hypotension. iHD 7/14-7/16. Received IL IVF yesterday with improvement, however, will balance fluids vs diuresis.  - Tacrolimus monitoring as above  - On lasix 20 mg daily for now    12. Extrahepatic and intrahepatic biliary dilation: abdominal US  with extrahepatic mass at level of common bile duct with associated intrahepatic and common bile duct dilation, patent hepatic vasculature, and layering gallbladder sludge. MRCP 8/9 with slight increase in moderate biliary dilation and gall bladder with moderate protein/hemorrhagic material. S/p ERCP 8/11 with findings concerning for hemobilia, stent placed across duodenum and in CBD. CTA abdomen 8/11 without evidence of acute GI bleed. GI team concerned bleeding originating from gallbladder. LFTs 9/7 WNL. Progressive decline in hgb per below  - May need to move up EGD scheduled on 10/13 or consider CT abdomen  (currently set for 6 months for pancreatic findings ~2/2023)     13. IPMN: MRCP 8/9 showed cystic foci in the pancreatic head measuring 4 x 3 mm superiorly and 4 x 3 mm inferiorly.   - Follow up imaging in 6 months to 1 year    14. Severe gastroparesis:   Pyloric ulcer: CT abdomen 7/15 with moderate to large gastric distention, otherwise without obstruction. Gastric emptying study 7/20 with severe gastric emptying delay (95% retention at 4 hours), s/p GJ tube placement in IR 7/27. S/p EGD 8/3 for coffee ground G tube output, noted to have pyloric ulcer and excessive gastric fluid and residual food. Hemoglobin drop with dark tarry stools 8/8.  S/p repeat EGD 8/11 with mild erythema 2/2 GJ tube trauma seen near insertion site but no active bleeding.   - PPI BID  - TF are continuous and ALL medications via J tube  - Repeat EGD 10/13 to check healing of ulcer, consider repeat EGD sooner given recurrent slow  decline in hgb     15. Post op pain management: pain level is between 4-5/10, mainly in the am or if she sits too long. Lidocaine patches were not effective. Using oxycodone intermittently, last use was on Saturday 9/3.  - Recommend Tylenol 1000 mg BID, continue heating packs; oxycodone PRN    16. Acute on chronic anemia: s/p 1 U PRBC on 9/1 with improvement in hgb to 8.1, now slowly trending down again. Ferritin elevated, iron and sat index WNL. B12 WNL. Retic % slightly elevated at 2.6, absolute RC WNL, haptoglobin elevated without evidence of active hemolysis and peripheral smear without polychromasia and macrocytosis which was known, no schistos. Unlikely to have been dapsone, although on hold. Thought secondary to chronic illness and BM suppression from meds, however, given recurrent decline, bleeding source should be evaluated.  - Consider moving up EGD and/or CTA abdomen if concern for gallbladder bleeding  - FOBT ordered but never collected, given liquid stools     17. Stress-induced/steroid induced hyperglycemia: no further lows since the feeding tube has been working/unclogged, using very infrequent novolog.   - Continue Lantus and sliding scale insulin    18. Diarrhea: with h/o C diff. Has been using imodium 1-2 times/day, but that has not been helping.  - Recommend repeat stool studies upon admission    Not discussed:  1. H/o hepatitis C     Signed out to triage, awaiting bed.      Kaylin Triana PA-C  Pulmonary, Allergy, Critical Care and Sleep Medicine        Interval History:     Patient notes worsening shortness of breath at night, had to go up to 2 L, did have one one night where she needed 3 L at night (was on 1L prior), couldn't catch her breath. Also notes she has coughed up some blood at night, first time was Saturday morning, has happened 5-6 times since then, usually early morning when she is sleeping. No hemoptysis, notes it's blood mixed with mucous. Last episode was early this am. No fever or  chills, no sinus or nasal congestion, no post nasal drainage. Cough is overall mild, more clearing of her throat. Notes some tightness in her whole chest, denies shortness of breath currently, cannot expand to take a deep breath. Currently on 1L at 99%. No chest pain or palpitations. Nausea with both am/pm medications, taking Zofran, but it's not too helpful. Taking it more for the constipation side effect. No abdominal pain. Stools are pure liquid for the last few weeks (had two stools a few weeks ago that were slightly formed, but nothing since that time), brown, no red, maroon or black stools. TF are 24 hours per day 35 ml/hour, all meds through her tube, very rarely eating.           Review of Systems:   Please see HPI, otherwise the complete 10 point ROS is negative.           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     Hepatitis 2017    Hep C, [facility]     HTN (hypertension)      Osteopenia      Past Surgical History:   Procedure Laterality Date     BRONCHOSCOPY (RIGID OR FLEXIBLE), DIAGNOSTIC N/A 8/2/2022    Procedure: BRONCHOSCOPY, DIAGNOSTIC- inspection Bronch;  Surgeon: Kamala Lovell MD;  Location:  GI     BRONCHOSCOPY FLEXIBLE AND RIGID N/A 07/19/2022    Procedure: BRONCHOSCOPY inspection only;  Surgeon: Bob Liao MD;  Location:  GI     COLONOSCOPY  2015     CV CORONARY ANGIOGRAM N/A 06/30/2021    Procedure: CV CORONARY ANGIOGRAM;  Surgeon: Alexander Cuellar MD;  Location:  HEART CARDIAC CATH LAB     CV RIGHT HEART CATH MEASUREMENTS RECORDED N/A 06/30/2021    Procedure: CV RIGHT HEART CATH;  Surgeon: Alexander Cuellar MD;  Location:  HEART CARDIAC CATH LAB     ENDOSCOPIC RETROGRADE CHOLANGIOPANCREATOGRAM N/A 8/11/2022    Procedure: ENDOSCOPIC RETROGRADE  CHOLANGIOPANCREATOGRAPHY WITH PANCREATIC DUCT NEEDLE KNIFE AND STENT PLACEMENT, BILE DUCT SPHINCTEROTOMY, BLOOD/DEBRIS REMOVAL AND STENT PLACEMENT;  Surgeon: Cosmo Arroyo MD;  Location: [facility] OR     ENT SURGERY  1974    tonsillectomy     ENTEROSCOPY SMALL BOWEL N/A 8/11/2022    Procedure: SMALL BOWEL ENTEROSCOPY;  Surgeon: Cosmo Arroyo MD;  Location: [facility] OR     ESOPHAGOGASTRODUODENOSCOPY, WITH NASOGASTRIC TUBE INSERTION N/A 07/01/2022    Procedure: ESOPHAGOGASTRODUODENOSCOPY, WITH NASOJEJUNAL TUBE INSERTION;  Surgeon: Ozzy Nickerson MD;  [facility]     ESOPHAGOSCOPY, GASTROSCOPY, DUODENOSCOPY (EGD), COMBINED N/A 8/3/2022    Procedure: ESOPHAGOGASTRODUODENOSCOPY (EGD);  Surgeon: Ira Andres MD;  Location:  GI     HAND SURGERY       IR GASTRO JEJUNOSTOMY TUBE CHANGE  8/31/2022     IR GASTRO JEJUNOSTOMY TUBE PLACEMENT  7/27/2022     IR THORACENTESIS  8/29/2022     LEEP TX, CERVICAL  04/07/2017    HECTOR III     LYMPH NODE BIOPSY Left 2005    Left axilla, benign- [place]     MIDLINE INSERTION - DOUBLE LUMEN Left 07/28/2022    20cm, Basilic vein     THORACENTESIS Left 8/29/2022    Procedure: THORACENTESIS;  Surgeon: Bo Capone PA-C;  Location: [facility] OR     THROMBECTOMY UPPER EXTREMITY Left 07/02/2022    Procedure: LEFT RADIAL ARM THROMBECTOMY;  Surgeon: Christie Graham MD;  Location:  OR     TRANSPLANT LUNG RECIPIENT SINGLE X2 Bilateral 06/28/2022    Procedure: Clamshell Incision, Bilateral Sequential Lung Transplant, On Cardiopulmonary Bypass, Flexible Bronchoscopy;  Surgeon: Sue Sunshine MD;  Location:  OR           Family History:     No family history on file.         Social History:     Social History     Socioeconomic History     Marital status: Single     Spouse name: Not on file     Number of children: Not on file     Years of education: Not on file     Highest education level: Not on file   Occupational History     Not on file   Tobacco  Use     Smoking status: Former Smoker     Years: 30.00     Types: Cigarettes     Quit date: 2020     Years since quittin.8     Smokeless tobacco: Never Used   Substance and Sexual Activity     Alcohol use: Not Currently     Drug use: Not Currently     Types: Marijuana, Methamphetamines     Comment: hx:marijuana and methamphetamine-quit both unsure ?  2-3 yrs ago     Sexual activity: Not on file   Other Topics Concern     Parent/sibling w/ CABG, MI or angioplasty before 65F 55M? Not Asked   Social History Narrative     Not on file     Social Determinants of Health     Financial Resource Strain: Not on file   Food Insecurity: Not on file   Transportation Needs: Not on file   Physical Activity: Not on file   Stress: Not on file   Social Connections: Not on file   Intimate Partner Violence: Not on file   Housing Stability: Not on file            Medications:     Current Outpatient Medications   Medication     acetaminophen (TYLENOL) 160 MG/5ML liquid     alcohol swab prep pads     aspirin (ASA) 81 MG EC tablet     blood glucose (CONTOUR NEXT TEST) test strip     blood glucose (NO BRAND SPECIFIED) lancets standard     blood glucose monitoring (CONTOUR NEXT MONITOR W/DEVICE KIT) meter device kit     calcium carbonate 600 mg-vitamin D 400 units (CALTRATE) 600-400 MG-UNIT per tablet     furosemide (LASIX) 20 MG tablet     insulin aspart (NOVOLOG PEN) 100 UNIT/ML pen     insulin glargine (LANTUS PEN) 100 UNIT/ML pen     insulin pen needle (31G X 5 MM) 31G X 5 MM miscellaneous     loperamide (IMODIUM A-D) 2 MG tablet     metoprolol tartrate (LOPRESSOR) 50 MG tablet     Multiple Vitamins-Minerals (MULTIVITAMINS W/MINERALS) liquid     mycophenolate (GENERIC EQUIVALENT) 200 MG/ML suspension     Nutritional Supplements (GLUCOSE MANAGEMENT) TABS     nystatin (MYCOSTATIN) 661358 UNIT/ML suspension     ondansetron (ZOFRAN ODT) 4 MG ODT tab     oxyCODONE (ROXICODONE) 5 MG tablet     pantoprazole (PROTONIX) 2 mg/mL SUSP  suspension     predniSONE (DELTASONE) 5 MG tablet     protein modular (PROSOURCE TF) LIQD     [employer] Container MISC     tacrolimus (GENERIC EQUIVALENT) 1 mg/mL suspension     valGANciclovir (VALCYTE) 50 MG/ML solution     No current facility-administered medications for this visit.            Physical Exam:   /71   Pulse 70   SpO2 99%     GENERAL: alert, mildly ill appearing in WC  HEENT: NCAT, EOMI, no scleral icterus, oral mucosa moist, mild yellowish plaque on back of tongue  Neck: no cervical or supraclavicular adenopathy  Lungs: moderate airflow, decreased in bases L>R, very few scattered basilar crackles, but mainly clear  CV: RRR, S1S2, no murmurs noted  Abdomen: normoactive BS, soft  Lymph: trace BLE, L>R  Neuro: AAO X 3, CN 2-12 grossly intact  Psychiatric: normal affect, good eye contact  Skin: no rash, jaundice or lesions on limited exam         Data:   All laboratory and imaging data reviewed.      Recent Results (from the past 168 hour(s))   Bld morphology pathology review    Collection Time: 09/01/22  5:26 PM   Result Value Ref Range    Final Diagnosis       Peripheral Blood Smear:  -Marked normochromic, macrocytic anemia; borderline increased erythrocyte regeneration; very rare spherocytes  -Lymphocytopenia      Comment       The significance of very rare spherocytes is unclear as this overlaps with what is sometimes seen in normal individuals or due to processing.        Clinical Information       From Epic electronic medical record; 62-year-old female is status post lung transplant on 6/28/2022 for end-stage COPD. Peripheral smear review requested for low hemoglobin.      Peripheral Smear       The red blood cells appear normochromic with rare hypochromic red blood cells.  Poikilocytosis includes very rare spherocytes.  Polychromasia is present but not definitively increased.  Rouleaux formation is not increased.   The morphology of the platelets is normal.        Peripheral Hematologic Data        CBC WITH DIFFERENTIAL(09/01/2022 09:20 AM CDT):     RESULT VALUE REF. RANGE UNITS   WBC Count   Hemoglobin    Hematocrit   Platelet Count   RBC Count    MCV   MCH   MCHC    RDW  9.0 (NORMAL)     6.3  ( LL )      21.4  ( L )  235 (NORMAL)   2.11  ( L )        101  ( H )     29.9 (NORMAL)      29.4  ( L )      18.2  ( H ) 4.0-11.0  11.7-15.7  35.0-47.0  150-450  3.80-5.20    26.5-33.0  31.5-36.5  10.0-15.0 10e3/uL  g/dL  %  10e3/uL  10e6/uL  fL  pg  g/dL  %   % Neutrophils  % Lymphocytes  % Monocytes  % Eosinophils  % Basophils  % Immature Granulocytes  Absolute Neutrophils   Absolute Lymphocytes   Absolute Monocytes  Absolute Eosinophils  Absolute Basophils  Absolute Immature Granulocytes  NRBCs per 100 WBC  Absolute NRBCs 87  3  7  1  0  2  7.9 (NORMAL)   0.2  ( L )  0.6 (NORMAL)  0.1 (NORMAL)  0.0 (NORMAL)  0.2 (NORMAL)  0 (NORMAL)  0.0 () N/A  N/A  N/A  N/A  N/A  N/A  1.6-8.3  0.8-5.3  0.0-1.3  0.0-0.7  0.0-0.2  <=0.4  <1  <=0.0 %  %  %  %  %  %  10e3/uL  10e3/uL  10e3/uL  10e3/uL  10e3/uL  10e3/uL  /100  10e3/uL     RETICULOCYTE COUNT (09/01/2022 09:20 AM CDT):  RESULT VALUE REF. RANGE UNITS    % Reticulocyte    Absolute Reticulocyte   4.7  ( H )   0.099  ( H ) 0.5-2.0  0.025-0.095 %  10e6/uL           Performing Labs       The technical component of this testing was completed at [facility]     Basic metabolic panel    Collection Time: 09/02/22 10:01 AM   Result Value Ref Range    Creatinine 1.34 (H) 0.51 - 0.95 mg/dL    Sodium 147 (H) 136 - 145 mmol/L    Potassium 3.7 3.4 - 5.3 mmol/L    Urea Nitrogen 51.8 (H) 8.0 - 23.0 mg/dL    Chloride 100 98 - 107 mmol/L    Carbon Dioxide (CO2) 36 (H) 22 - 29 mmol/L    Anion Gap 11 7 - 15 mmol/L    Glucose 123 (H) 70 - 99 mg/dL    GFR Estimate 45 (L) >60 mL/min/1.73m2    Calcium 9.2 8.8 - 10.2 mg/dL   Magnesium    Collection Time: 09/02/22 10:01 AM   Result Value Ref Range    Magnesium 2.4  (H) 1.7 - 2.3 mg/dL   CBC with platelets    Collection Time: 09/02/22 10:01 AM   Result Value Ref Range    WBC Count 9.5 4.0 - 11.0 10e3/uL    RBC Count 2.72 (L) 3.80 - 5.20 10e6/uL    Hemoglobin 8.1 (L) 11.7 - 15.7 g/dL    Hematocrit 26.7 (L) 35.0 - 47.0 %    MCV 98 78 - 100 fL    MCH 29.8 26.5 - 33.0 pg    MCHC 30.3 (L) 31.5 - 36.5 g/dL    RDW 20.1 (H) 10.0 - 15.0 %    Platelet Count 222 150 - 450 10e3/uL   Basic metabolic panel    Collection Time: 09/03/22 10:39 AM   Result Value Ref Range    Creatinine 1.71 (H) 0.51 - 0.95 mg/dL    Sodium 144 136 - 145 mmol/L    Potassium 4.5 3.4 - 5.3 mmol/L    Urea Nitrogen 63.9 (H) 8.0 - 23.0 mg/dL    Chloride 96 (L) 98 - 107 mmol/L    Carbon Dioxide (CO2) 38 (H) 22 - 29 mmol/L    Anion Gap 10 7 - 15 mmol/L    Glucose 161 (H) 70 - 99 mg/dL    GFR Estimate 34 (L) >60 mL/min/1.73m2    Calcium 9.4 8.8 - 10.2 mg/dL   CBC with platelets    Collection Time: 09/03/22 10:39 AM   Result Value Ref Range    WBC Count 10.6 4.0 - 11.0 10e3/uL    RBC Count 2.71 (L) 3.80 - 5.20 10e6/uL    Hemoglobin 8.0 (L) 11.7 - 15.7 g/dL    Hematocrit 27.3 (L) 35.0 - 47.0 %     (H) 78 - 100 fL    MCH 29.5 26.5 - 33.0 pg    MCHC 29.3 (L) 31.5 - 36.5 g/dL    RDW 18.8 (H) 10.0 - 15.0 %    Platelet Count 230 150 - 450 10e3/uL   Ferritin    Collection Time: 09/03/22 10:39 AM   Result Value Ref Range    Ferritin 343 (H) 11 - 328 ng/mL   Iron & Iron Binding Capacity    Collection Time: 09/03/22 10:39 AM   Result Value Ref Range    Iron 21 (L) 37 - 145 ug/dL    Iron Sat Index 9 (L) 15 - 46 %    Iron Binding Capacity 233 (L) 240 - 430 ug/dL   Hepatic panel    Collection Time: 09/07/22  8:58 AM   Result Value Ref Range    Protein Total 6.0 (L) 6.4 - 8.3 g/dL    Albumin 3.4 (L) 3.5 - 5.2 g/dL    Bilirubin Total 0.2 <=1.2 mg/dL    Alkaline Phosphatase 98 35 - 104 U/L    AST 16 10 - 35 U/L    ALT 14 10 - 35 U/L    Bilirubin Direct <0.20 0.00 - 0.30 mg/dL   Basic metabolic panel    Collection Time: 09/07/22   8:58 AM   Result Value Ref Range    Creatinine 1.90 (H) 0.51 - 0.95 mg/dL    Sodium 144 136 - 145 mmol/L    Potassium 3.8 3.4 - 5.3 mmol/L    Urea Nitrogen 90.3 (H) 8.0 - 23.0 mg/dL    Chloride 93 (L) 98 - 107 mmol/L    Carbon Dioxide (CO2) 43 (H) 22 - 29 mmol/L    Anion Gap 8 7 - 15 mmol/L    Glucose 87 70 - 99 mg/dL    GFR Estimate 30 (L) >60 mL/min/1.73m2    Calcium 9.4 8.8 - 10.2 mg/dL   Magnesium    Collection Time: 09/07/22  8:58 AM   Result Value Ref Range    Magnesium 2.7 (H) 1.7 - 2.3 mg/dL   CMV DNA quantification    Collection Time: 09/07/22  8:58 AM    Specimen: Arm, Right; Blood   Result Value Ref Range    CMV DNA IU/mL Not Detected Not Detected IU/mL   Tacrolimus by Tandem Mass Spectrometry    Collection Time: 09/07/22  8:58 AM   Result Value Ref Range    Tacrolimus by Tandem Mass Spectrometry 8.3 5.0 - 15.0 ug/L    Tacrolimus Last Dose Date      Tacrolimus Last Dose Time     CBC with platelets and differential    Collection Time: 09/07/22  8:58 AM   Result Value Ref Range    WBC Count 7.7 4.0 - 11.0 10e3/uL    RBC Count 2.54 (L) 3.80 - 5.20 10e6/uL    Hemoglobin 7.7 (L) 11.7 - 15.7 g/dL    Hematocrit 26.2 (L) 35.0 - 47.0 %     (H) 78 - 100 fL    MCH 30.3 26.5 - 33.0 pg    MCHC 29.4 (L) 31.5 - 36.5 g/dL    RDW 18.4 (H) 10.0 - 15.0 %    Platelet Count 285 150 - 450 10e3/uL    % Neutrophils 81 %    % Lymphocytes 4 %    % Monocytes 10 %    % Eosinophils 2 %    % Basophils 0 %    % Immature Granulocytes 3 %    NRBCs per 100 WBC 0 <1 /100    Absolute Neutrophils 6.3 1.6 - 8.3 10e3/uL    Absolute Lymphocytes 0.3 (L) 0.8 - 5.3 10e3/uL    Absolute Monocytes 0.8 0.0 - 1.3 10e3/uL    Absolute Eosinophils 0.1 0.0 - 0.7 10e3/uL    Absolute Basophils 0.0 0.0 - 0.2 10e3/uL    Absolute Immature Granulocytes 0.2 <=0.4 10e3/uL    Absolute NRBCs 0.0 10e3/uL   General PFT Lab (Please always keep checked)    Collection Time: 09/07/22  9:01 AM   Result Value Ref Range    FVC-Pred 3.06 L    FVC-Pre 1.01 L     FVC-%Pred-Pre 33 %    FEV1-Pre 0.98 L    FEV1-%Pred-Pre 40 %    FEV1FVC-Pred 79 %    FEV1FVC-Pre 97 %    FEFMax-Pred 6.14 L/sec    FEFMax-Pre 4.07 L/sec    FEFMax-%Pred-Pre 66 %    FEF2575-Pred 2.22 L/sec    FEF2575-Pre 2.90 L/sec    KRT4874-%Pred-Pre 130 %    ExpTime-Pre 5.54 sec    FIFMax-Pre 3.12 L/sec    FEV1FEV6-Pred 81 %    FEV1FEV6-Pre 98 %   Basic metabolic panel    Collection Time: 09/08/22  7:37 AM   Result Value Ref Range    Creatinine 1.56 (H) 0.51 - 0.95 mg/dL    Sodium 145 136 - 145 mmol/L    Potassium 3.7 3.4 - 5.3 mmol/L    Urea Nitrogen 82.3 (H) 8.0 - 23.0 mg/dL    Chloride 96 (L) 98 - 107 mmol/L    Carbon Dioxide (CO2) 40 (H) 22 - 29 mmol/L    Anion Gap 9 7 - 15 mmol/L    Glucose 177 (H) 70 - 99 mg/dL    GFR Estimate 38 (L) >60 mL/min/1.73m2    Calcium 9.4 8.8 - 10.2 mg/dL     PFT interpretation:  Maneuver: valid, but did not meet ATS guidelines      Again, thank you for allowing me to participate in the care of your patient.        Sincerely,        Kaylin Triana PA-C

## 2022-09-08 NOTE — LETTER
Date:September 13, 2022      Patient was self referred, no letter generated. Do not send.        [facility] Information

## 2022-09-08 NOTE — NURSING NOTE
Reviewed with Dr. Franks after the visit today. Plan to give another day to wait for a bed, patient can return to [facility] for now. Will check bed status tomorrow morning. If still no bed tomorrow, pt will need to come through ER. Can continue to hold ASA. Pt and son aware.

## 2022-09-08 NOTE — PATIENT INSTRUCTIONS
Patient Instructions  1. Continue to hydrate with 60-70 oz fluids daily.  2. Continue to exercise daily or most days of the week.  3. It doesn't seem like the COVID vaccine is working well in lung transplant patients. A number of lung transplant patients have gotten sick with COVID even after receiving the vaccines. Based on our recent experience, it can be life-threatening to get COVID even after being vaccinated. Please continue to act like you did not get the COVID vaccine - social distancing, wearing a mask, good hand hygiene, etc. If the people around you are vaccinated, it will help reduce the risk of you getting COVID. All members of your household should be vaccinated.  4. Admit to hospital soon.     Next transplant clinic appointment: TBD  Next lab draw: [id number]

## 2022-09-09 ENCOUNTER — HOSPITAL ENCOUNTER (INPATIENT)
Facility: CLINIC | Age: 62
LOS: 29 days | Discharge: HOME | DRG: 205 | End: 2022-10-08
Attending: INTERNAL MEDICINE | Admitting: INTERNAL MEDICINE
Payer: MEDICARE

## 2022-09-09 ENCOUNTER — TELEPHONE (OUTPATIENT)
Dept: TRANSPLANT | Facility: CLINIC | Age: 62
End: 2022-09-09

## 2022-09-09 ENCOUNTER — HOSPITAL ENCOUNTER (EMERGENCY)
Facility: CLINIC | Age: 62
Discharge: HOME | DRG: 205 | End: 2022-09-09
Payer: MEDICARE

## 2022-09-09 VITALS
TEMPERATURE: 98.4 F | DIASTOLIC BLOOD PRESSURE: 56 MMHG | RESPIRATION RATE: 22 BRPM | HEART RATE: 92 BPM | SYSTOLIC BLOOD PRESSURE: 122 MMHG | OXYGEN SATURATION: 100 %

## 2022-09-09 DIAGNOSIS — H04.123 DRY EYES: ICD-10-CM

## 2022-09-09 DIAGNOSIS — N18.32 ANEMIA OF CHRONIC RENAL FAILURE, STAGE 3B (H): ICD-10-CM

## 2022-09-09 DIAGNOSIS — K59.1 FUNCTIONAL DIARRHEA: ICD-10-CM

## 2022-09-09 DIAGNOSIS — D84.9 IMMUNOSUPPRESSED STATUS (H): Chronic | ICD-10-CM

## 2022-09-09 DIAGNOSIS — Z94.2 LUNG REPLACED BY TRANSPLANT (H): ICD-10-CM

## 2022-09-09 DIAGNOSIS — R73.9 HYPERGLYCEMIA: ICD-10-CM

## 2022-09-09 DIAGNOSIS — Z94.2 S/P LUNG TRANSPLANT (H): Chronic | ICD-10-CM

## 2022-09-09 DIAGNOSIS — T86.91 TRANSPLANT REJECTION: Primary | ICD-10-CM

## 2022-09-09 DIAGNOSIS — D63.1 ANEMIA OF CHRONIC RENAL FAILURE, STAGE 3B (H): ICD-10-CM

## 2022-09-09 DIAGNOSIS — R52 GENERALIZED PAIN: ICD-10-CM

## 2022-09-09 DIAGNOSIS — G47.09 OTHER INSOMNIA: ICD-10-CM

## 2022-09-09 LAB
ABO/RH(D): NORMAL
ANTIBODY SCREEN: NEGATIVE
BACTERIA PLR CULT: NO GROWTH
BASOPHILS # BLD MANUAL: 0 10E3/UL (ref 0–0.2)
BASOPHILS NFR BLD MANUAL: 0 %
BURR CELLS BLD QL SMEAR: SLIGHT
EOSINOPHIL # BLD MANUAL: 0 10E3/UL (ref 0–0.7)
EOSINOPHIL NFR BLD MANUAL: 0 %
ERYTHROCYTE [DISTWIDTH] IN BLOOD BY AUTOMATED COUNT: 19.2 % (ref 10–15)
GLUCOSE BLDC GLUCOMTR-MCNC: 149 MG/DL (ref 70–99)
HCT VFR BLD AUTO: 24.6 % (ref 35–47)
HGB BLD-MCNC: 7.1 G/DL (ref 11.7–15.7)
HOLD SPECIMEN: NORMAL
LYMPHOCYTES # BLD MANUAL: 0.1 10E3/UL (ref 0.8–5.3)
LYMPHOCYTES NFR BLD MANUAL: 1 %
MCH RBC QN AUTO: 29.7 PG (ref 26.5–33)
MCHC RBC AUTO-ENTMCNC: 28.9 G/DL (ref 31.5–36.5)
MCV RBC AUTO: 103 FL (ref 78–100)
MONOCYTES # BLD MANUAL: 0.3 10E3/UL (ref 0–1.3)
MONOCYTES NFR BLD MANUAL: 4 %
MYELOCYTES # BLD MANUAL: 0.1 10E3/UL
MYELOCYTES NFR BLD MANUAL: 1 %
NEUTROPHILS # BLD MANUAL: 8.1 10E3/UL (ref 1.6–8.3)
NEUTROPHILS NFR BLD MANUAL: 94 %
NT-PROBNP SERPL-MCNC: ABNORMAL PG/ML (ref 0–900)
PLAT MORPH BLD: ABNORMAL
PLATELET # BLD AUTO: 305 10E3/UL (ref 150–450)
POLYCHROMASIA BLD QL SMEAR: SLIGHT
RBC # BLD AUTO: 2.39 10E6/UL (ref 3.8–5.2)
RBC MORPH BLD: ABNORMAL
SARS-COV-2 RNA RESP QL NAA+PROBE: NEGATIVE
SPECIMEN EXPIRATION DATE: NORMAL
WBC # BLD AUTO: 8.6 10E3/UL (ref 4–11)

## 2022-09-09 PROCEDURE — 83880 ASSAY OF NATRIURETIC PEPTIDE: CPT | Performed by: EMERGENCY MEDICINE

## 2022-09-09 PROCEDURE — 214N000001 HC R&B CCU UMMC

## 2022-09-09 PROCEDURE — U0005 INFEC AGEN DETEC AMPLI PROBE: HCPCS | Performed by: EMERGENCY MEDICINE

## 2022-09-09 PROCEDURE — 99223 1ST HOSP IP/OBS HIGH 75: CPT | Mod: AI | Performed by: INTERNAL MEDICINE

## 2022-09-09 PROCEDURE — 85007 BL SMEAR W/DIFF WBC COUNT: CPT | Performed by: EMERGENCY MEDICINE

## 2022-09-09 PROCEDURE — 36415 COLL VENOUS BLD VENIPUNCTURE: CPT | Performed by: EMERGENCY MEDICINE

## 2022-09-09 PROCEDURE — 85027 COMPLETE CBC AUTOMATED: CPT | Performed by: EMERGENCY MEDICINE

## 2022-09-09 RX ORDER — NYSTATIN 100000/ML
1000000 SUSPENSION, ORAL (FINAL DOSE FORM) ORAL 4 TIMES DAILY
Status: DISCONTINUED | OUTPATIENT
Start: 2022-09-10 | End: 2022-10-08 | Stop reason: HOSPADM

## 2022-09-09 RX ORDER — MYCOPHENOLATE MOFETIL 200 MG/ML
750 POWDER, FOR SUSPENSION ORAL 2 TIMES DAILY
Status: DISCONTINUED | OUTPATIENT
Start: 2022-09-10 | End: 2022-09-19

## 2022-09-09 RX ORDER — ACETAMINOPHEN 325 MG/10.15ML
975 LIQUID ORAL 2 TIMES DAILY
Status: DISCONTINUED | OUTPATIENT
Start: 2022-09-09 | End: 2022-09-12

## 2022-09-09 RX ORDER — ONDANSETRON 2 MG/ML
4 INJECTION INTRAMUSCULAR; INTRAVENOUS EVERY 6 HOURS PRN
Status: DISCONTINUED | OUTPATIENT
Start: 2022-09-09 | End: 2022-10-08 | Stop reason: HOSPADM

## 2022-09-09 RX ORDER — OXYCODONE HYDROCHLORIDE 5 MG/1
5 TABLET ORAL ONCE
Status: COMPLETED | OUTPATIENT
Start: 2022-09-10 | End: 2022-09-10

## 2022-09-09 RX ORDER — ONDANSETRON 4 MG/1
4 TABLET, ORALLY DISINTEGRATING ORAL EVERY 6 HOURS PRN
Status: DISCONTINUED | OUTPATIENT
Start: 2022-09-09 | End: 2022-10-08 | Stop reason: HOSPADM

## 2022-09-09 RX ORDER — METOPROLOL TARTRATE 50 MG
50 TABLET ORAL 2 TIMES DAILY
Status: DISCONTINUED | OUTPATIENT
Start: 2022-09-09 | End: 2022-09-12

## 2022-09-09 RX ORDER — FUROSEMIDE 20 MG
20 TABLET ORAL DAILY
Status: DISCONTINUED | OUTPATIENT
Start: 2022-09-10 | End: 2022-09-10

## 2022-09-09 RX ORDER — DOCUSATE SODIUM 100 MG/1
100 CAPSULE, LIQUID FILLED ORAL 2 TIMES DAILY
Status: DISCONTINUED | OUTPATIENT
Start: 2022-09-10 | End: 2022-09-12

## 2022-09-09 RX ORDER — PREDNISONE 10 MG/1
10 TABLET ORAL 2 TIMES DAILY
Status: DISPENSED | OUTPATIENT
Start: 2022-09-10 | End: 2022-09-14

## 2022-09-09 RX ORDER — VALGANCICLOVIR HYDROCHLORIDE 50 MG/ML
450 POWDER, FOR SOLUTION ORAL
Status: DISCONTINUED | OUTPATIENT
Start: 2022-09-12 | End: 2022-09-27

## 2022-09-09 RX ORDER — LIDOCAINE 40 MG/G
CREAM TOPICAL
Status: DISCONTINUED | OUTPATIENT
Start: 2022-09-09 | End: 2022-09-23

## 2022-09-09 RX ORDER — GUAIFENESIN 600 MG/1
15 TABLET, EXTENDED RELEASE ORAL DAILY
Status: DISCONTINUED | OUTPATIENT
Start: 2022-09-10 | End: 2022-09-14

## 2022-09-09 ASSESSMENT — ACTIVITIES OF DAILY LIVING (ADL)
ADLS_ACUITY_SCORE: 39
ADLS_ACUITY_SCORE: 33

## 2022-09-09 NOTE — ED TRIAGE NOTES
"Patient presents in a wheelchair to triage with reports of coughing up blood once a day x 6 days, increased shortness of breath and increased O2 needs. Recent lung transplant on 6/28/22. Per patient her \"anitbodies are high\" and per her son she will need \"a biopsy to take a sample.\" Per son \"they have been trying to get her a room for 3 days, but can't so they said to come to the ED.\"      Triage Assessment     Row Name 09/09/22 [id number]       Triage Assessment (Adult)    Airway WDL WDL       Respiratory WDL    Respiratory WDL X;rhythm/pattern    Rhythm/Pattern, Respiratory dyspnea on exertion       Skin Circulation/Temperature WDL    Skin Circulation/Temperature WDL WDL       Cardiac WDL    Cardiac WDL WDL       Peripheral/Neurovascular WDL    Peripheral Neurovascular WDL WDL       Cognitive/Neuro/Behavioral WDL    Cognitive/Neuro/Behavioral WDL WDL              "

## 2022-09-09 NOTE — TELEPHONE ENCOUNTER
Spoke with admissions, still no bed available and may not be tonight. Plan to have patient come to ER. She will bring 8pm meds in case there is a wait (likely). Pt and son agree with plan. Dr. Franks aware.

## 2022-09-10 LAB
ALBUMIN SERPL BCG-MCNC: 3.1 G/DL (ref 3.5–5.2)
ALP SERPL-CCNC: 88 U/L (ref 35–104)
ALT SERPL W P-5'-P-CCNC: 11 U/L (ref 10–35)
ANION GAP SERPL CALCULATED.3IONS-SCNC: 1 MMOL/L (ref 7–15)
AST SERPL W P-5'-P-CCNC: 17 U/L (ref 10–35)
BILIRUB SERPL-MCNC: 0.2 MG/DL
BUN SERPL-MCNC: 76.5 MG/DL (ref 8–23)
C DIFF TOX B STL QL: NEGATIVE
CALCIUM SERPL-MCNC: 9.1 MG/DL (ref 8.8–10.2)
CHLORIDE SERPL-SCNC: 95 MMOL/L (ref 98–107)
CREAT SERPL-MCNC: 1.45 MG/DL (ref 0.51–0.95)
DEPRECATED HCO3 PLAS-SCNC: 48 MMOL/L (ref 22–29)
ERYTHROCYTE [DISTWIDTH] IN BLOOD BY AUTOMATED COUNT: 19.5 % (ref 10–15)
GFR SERPL CREATININE-BSD FRML MDRD: 41 ML/MIN/1.73M2
GLUCOSE BLDC GLUCOMTR-MCNC: 159 MG/DL (ref 70–99)
GLUCOSE BLDC GLUCOMTR-MCNC: 160 MG/DL (ref 70–99)
GLUCOSE BLDC GLUCOMTR-MCNC: 163 MG/DL (ref 70–99)
GLUCOSE BLDC GLUCOMTR-MCNC: 171 MG/DL (ref 70–99)
GLUCOSE SERPL-MCNC: 184 MG/DL (ref 70–99)
HCT VFR BLD AUTO: 25.1 % (ref 35–47)
HGB BLD-MCNC: 7.2 G/DL (ref 11.7–15.7)
MAGNESIUM SERPL-MCNC: 2.3 MG/DL (ref 1.7–2.3)
MCH RBC QN AUTO: 30.3 PG (ref 26.5–33)
MCHC RBC AUTO-ENTMCNC: 28.7 G/DL (ref 31.5–36.5)
MCV RBC AUTO: 106 FL (ref 78–100)
PHOSPHATE SERPL-MCNC: 3.1 MG/DL (ref 2.5–4.5)
PLATELET # BLD AUTO: 271 10E3/UL (ref 150–450)
POTASSIUM SERPL-SCNC: 4 MMOL/L (ref 3.4–5.3)
PROT SERPL-MCNC: 5.5 G/DL (ref 6.4–8.3)
RBC # BLD AUTO: 2.38 10E6/UL (ref 3.8–5.2)
SODIUM SERPL-SCNC: 144 MMOL/L (ref 136–145)
TACROLIMUS BLD-MCNC: 10.2 UG/L (ref 5–15)
TME LAST DOSE: NORMAL H
TME LAST DOSE: NORMAL H
WBC # BLD AUTO: 7.7 10E3/UL (ref 4–11)

## 2022-09-10 PROCEDURE — 99233 SBSQ HOSP IP/OBS HIGH 50: CPT | Performed by: PEDIATRICS

## 2022-09-10 PROCEDURE — 84100 ASSAY OF PHOSPHORUS: CPT

## 2022-09-10 PROCEDURE — 99207 PR NO BILLABLE SERVICE THIS VISIT: CPT | Performed by: INTERNAL MEDICINE

## 2022-09-10 PROCEDURE — 87493 C DIFF AMPLIFIED PROBE: CPT

## 2022-09-10 PROCEDURE — 214N000001 HC R&B CCU UMMC

## 2022-09-10 PROCEDURE — 250N000013 HC RX MED GY IP 250 OP 250 PS 637

## 2022-09-10 PROCEDURE — 999N000248 HC STATISTIC IV INSERT WITH US BY RN

## 2022-09-10 PROCEDURE — 99223 1ST HOSP IP/OBS HIGH 75: CPT | Mod: 24 | Performed by: INTERNAL MEDICINE

## 2022-09-10 PROCEDURE — 85027 COMPLETE CBC AUTOMATED: CPT

## 2022-09-10 PROCEDURE — 80053 COMPREHEN METABOLIC PANEL: CPT

## 2022-09-10 PROCEDURE — 83735 ASSAY OF MAGNESIUM: CPT

## 2022-09-10 PROCEDURE — 250N000011 HC RX IP 250 OP 636: Performed by: PEDIATRICS

## 2022-09-10 PROCEDURE — 80197 ASSAY OF TACROLIMUS: CPT

## 2022-09-10 PROCEDURE — 258N000003 HC RX IP 258 OP 636: Performed by: PEDIATRICS

## 2022-09-10 PROCEDURE — 86901 BLOOD TYPING SEROLOGIC RH(D): CPT

## 2022-09-10 PROCEDURE — 83921 ORGANIC ACID SINGLE QUANT: CPT

## 2022-09-10 PROCEDURE — 86850 RBC ANTIBODY SCREEN: CPT

## 2022-09-10 PROCEDURE — 250N000012 HC RX MED GY IP 250 OP 636 PS 637: Performed by: INTERNAL MEDICINE

## 2022-09-10 PROCEDURE — 250N000012 HC RX MED GY IP 250 OP 636 PS 637: Performed by: PEDIATRICS

## 2022-09-10 PROCEDURE — 36415 COLL VENOUS BLD VENIPUNCTURE: CPT

## 2022-09-10 PROCEDURE — 250N000012 HC RX MED GY IP 250 OP 636 PS 637

## 2022-09-10 RX ORDER — NALOXONE HYDROCHLORIDE 0.4 MG/ML
0.2 INJECTION, SOLUTION INTRAMUSCULAR; INTRAVENOUS; SUBCUTANEOUS
Status: DISCONTINUED | OUTPATIENT
Start: 2022-09-10 | End: 2022-10-08 | Stop reason: HOSPADM

## 2022-09-10 RX ORDER — OXYCODONE HCL 5 MG/5 ML
5 SOLUTION, ORAL ORAL EVERY 8 HOURS PRN
Status: DISCONTINUED | OUTPATIENT
Start: 2022-09-10 | End: 2022-09-16

## 2022-09-10 RX ORDER — AMINO AC/PROTEIN HYDR/WHEY PRO 10G-100/30
1 LIQUID (ML) ORAL DAILY
Status: DISCONTINUED | OUTPATIENT
Start: 2022-09-10 | End: 2022-09-14

## 2022-09-10 RX ORDER — NALOXONE HYDROCHLORIDE 0.4 MG/ML
0.4 INJECTION, SOLUTION INTRAMUSCULAR; INTRAVENOUS; SUBCUTANEOUS
Status: DISCONTINUED | OUTPATIENT
Start: 2022-09-10 | End: 2022-10-08 | Stop reason: HOSPADM

## 2022-09-10 RX ORDER — TRAZODONE HYDROCHLORIDE 50 MG/1
50 TABLET, FILM COATED ORAL AT BEDTIME
Status: DISCONTINUED | OUTPATIENT
Start: 2022-09-10 | End: 2022-09-12

## 2022-09-10 RX ORDER — FUROSEMIDE 10 MG/ML
40 INJECTION INTRAMUSCULAR; INTRAVENOUS DAILY
Status: DISCONTINUED | OUTPATIENT
Start: 2022-09-11 | End: 2022-09-14

## 2022-09-10 RX ORDER — FUROSEMIDE 10 MG/ML
40 INJECTION INTRAMUSCULAR; INTRAVENOUS ONCE
Status: COMPLETED | OUTPATIENT
Start: 2022-09-10 | End: 2022-09-10

## 2022-09-10 RX ORDER — FUROSEMIDE 10 MG/ML
40 INJECTION INTRAMUSCULAR; INTRAVENOUS DAILY
Status: DISCONTINUED | OUTPATIENT
Start: 2022-09-10 | End: 2022-09-10

## 2022-09-10 RX ORDER — DEXTROSE MONOHYDRATE 25 G/50ML
25-50 INJECTION, SOLUTION INTRAVENOUS
Status: DISCONTINUED | OUTPATIENT
Start: 2022-09-10 | End: 2022-10-08 | Stop reason: HOSPADM

## 2022-09-10 RX ORDER — DEXTROSE MONOHYDRATE 100 MG/ML
INJECTION, SOLUTION INTRAVENOUS CONTINUOUS PRN
Status: DISCONTINUED | OUTPATIENT
Start: 2022-09-10 | End: 2022-10-08 | Stop reason: HOSPADM

## 2022-09-10 RX ORDER — SODIUM CHLORIDE 9 MG/ML
INJECTION, SOLUTION INTRAVENOUS CONTINUOUS
Status: DISCONTINUED | OUTPATIENT
Start: 2022-09-10 | End: 2022-09-10 | Stop reason: CLARIF

## 2022-09-10 RX ORDER — NICOTINE POLACRILEX 4 MG
15-30 LOZENGE BUCCAL
Status: DISCONTINUED | OUTPATIENT
Start: 2022-09-10 | End: 2022-10-08 | Stop reason: HOSPADM

## 2022-09-10 RX ADMIN — PREDNISONE 10 MG: 10 TABLET ORAL at 08:36

## 2022-09-10 RX ADMIN — Medication 1 PACKET: at 15:24

## 2022-09-10 RX ADMIN — SODIUM CHLORIDE: 9 INJECTION, SOLUTION INTRAVENOUS at 09:32

## 2022-09-10 RX ADMIN — NYSTATIN 1000000 UNITS: 100000 SUSPENSION ORAL at 13:11

## 2022-09-10 RX ADMIN — METOPROLOL TARTRATE 50 MG: 50 TABLET, FILM COATED ORAL at 08:36

## 2022-09-10 RX ADMIN — FUROSEMIDE 20 MG: 20 TABLET ORAL at 08:36

## 2022-09-10 RX ADMIN — Medication 40 MG: at 20:24

## 2022-09-10 RX ADMIN — MULTIVITAMIN 15 ML: LIQUID ORAL at 13:11

## 2022-09-10 RX ADMIN — TACROLIMUS 3.5 MG: 5 CAPSULE ORAL at 17:39

## 2022-09-10 RX ADMIN — ACETAMINOPHEN 975 MG: 325 SOLUTION ORAL at 08:34

## 2022-09-10 RX ADMIN — CALCIUM CARBONATE 600 MG (1,500 MG)-VITAMIN D3 400 UNIT TABLET 1 TABLET: at 08:36

## 2022-09-10 RX ADMIN — NYSTATIN 1000000 UNITS: 100000 SUSPENSION ORAL at 08:36

## 2022-09-10 RX ADMIN — OXYCODONE HYDROCHLORIDE 5 MG: 5 SOLUTION ORAL at 17:38

## 2022-09-10 RX ADMIN — METOPROLOL TARTRATE 50 MG: 50 TABLET, FILM COATED ORAL at 20:20

## 2022-09-10 RX ADMIN — INSULIN GLARGINE 4 UNITS: 100 INJECTION, SOLUTION SUBCUTANEOUS at 11:15

## 2022-09-10 RX ADMIN — NYSTATIN 1000000 UNITS: 100000 SUSPENSION ORAL at 20:20

## 2022-09-10 RX ADMIN — CALCIUM CARBONATE 600 MG (1,500 MG)-VITAMIN D3 400 UNIT TABLET 1 TABLET: at 17:39

## 2022-09-10 RX ADMIN — TACROLIMUS 3.5 MG: 5 CAPSULE ORAL at 08:35

## 2022-09-10 RX ADMIN — FUROSEMIDE 40 MG: 10 INJECTION, SOLUTION INTRAVENOUS at 14:58

## 2022-09-10 RX ADMIN — Medication 40 MG: at 08:35

## 2022-09-10 RX ADMIN — PREDNISONE 10 MG: 10 TABLET ORAL at 20:20

## 2022-09-10 RX ADMIN — ACETAMINOPHEN 975 MG: 325 SOLUTION ORAL at 20:19

## 2022-09-10 RX ADMIN — METOPROLOL TARTRATE 50 MG: 50 TABLET, FILM COATED ORAL at 00:27

## 2022-09-10 RX ADMIN — MYCOPHENOLATE MOFETIL 750 MG: 200 POWDER, FOR SUSPENSION ORAL at 08:35

## 2022-09-10 RX ADMIN — OXYCODONE HYDROCHLORIDE 5 MG: 5 TABLET ORAL at 00:26

## 2022-09-10 RX ADMIN — MYCOPHENOLATE MOFETIL 750 MG: 200 POWDER, FOR SUSPENSION ORAL at 20:24

## 2022-09-10 RX ADMIN — OXYCODONE HYDROCHLORIDE 5 MG: 5 SOLUTION ORAL at 08:35

## 2022-09-10 ASSESSMENT — ACTIVITIES OF DAILY LIVING (ADL)
DRESSING/BATHING_DIFFICULTY: NO
ADLS_ACUITY_SCORE: 28
DOING_ERRANDS_INDEPENDENTLY_DIFFICULTY: NO
WALKING_OR_CLIMBING_STAIRS: AMBULATION DIFFICULTY, ASSISTANCE 1 PERSON
TRANSFERRING: 0-->ASSISTANCE NEEDED (DEVELOPMENTALLY APPROPRIATE)
TOILETING_ISSUES: NO
ADLS_ACUITY_SCORE: 28
WEAR_GLASSES_OR_BLIND: YES
WALKING_OR_CLIMBING_STAIRS_DIFFICULTY: YES
ADLS_ACUITY_SCORE: 26
ADLS_ACUITY_SCORE: 28
DIFFICULTY_EATING/SWALLOWING: NO
ADLS_ACUITY_SCORE: 28
EQUIPMENT_CURRENTLY_USED_AT_HOME: WALKER, ROLLING;SHOWER CHAIR
ADLS_ACUITY_SCORE: 28
FALL_HISTORY_WITHIN_LAST_SIX_MONTHS: NO
CONCENTRATING,_REMEMBERING_OR_MAKING_DECISIONS_DIFFICULTY: NO
TRANSFERRING: 1-->ASSISTANCE (EQUIPMENT/PERSON) NEEDED
ADLS_ACUITY_SCORE: 28

## 2022-09-10 NOTE — PLAN OF CARE
Goal Outcome Evaluation:    Plan of Care Reviewed With: patient     Overall Patient Progress: no change  Neuro: A&Ox4.   Cardiac: SR. VSS.   Respiratory: Sating 95-96% on 2L NC  GI/: Adequate urine output. BM X3 Patient has been having loose stools. A c-diff sample has been sent  Diet/appetite: NPO for potential lung biopsy today. Eating well.  Activity:  Assist of x1, up to chair and in halls.  Pain: At acceptable level on current regimen.   Skin: No new deficits noted.  LDA's:    Plan: Continue with POC. Notify primary team with changes.

## 2022-09-10 NOTE — PHARMACY-CONSULT NOTE
Pharmacy Tube Feeding Consult    Medication reviewed for administration by feeding tube and for potential food/drug interactions.    Recommendation: No changes are needed at this time.     Pharmacy will continue to follow as new medications are ordered.    Irina Mcmahon RPH

## 2022-09-10 NOTE — PLAN OF CARE
Neuro: A&Ox4.   Cardiac: Afebrile, VSS. SR.   Respiratory: 1.5L at rest, increased to 3L when OOB to commode. Very dyspneic with little exertion. Produced blood tinged mucous with cough once this shift.  GI/: Continent of bowel and bladder. Loose stool x 1 this morning. C.diff negative.  Diet/appetite: NPO this morning. Was off her home TF from about 10am and until orders for transition to hospital TF were available. NS@50 during this time. Currently on novasource@35ml/hr (goal) with FWF 30ccQ4 hours through Jtube. All meds given through J tube, which patient was doing at [facility]. Gtube venting to gravity bag.  Activity: Up with SBA/A1.  Pain: Back pain this morning. Took 5mg oxycodone.    Skin: Bruising from insulin shots. Healing surgical incision. Rectum reddened from frequent BMs, barrier added. Edema in BLE and abdomen. Scheduled oral lasix given this am and 40mg IV x1 given this afternoon.  Lines: PIV with NS@50. Page sent to [employer] asking if this should be continued since TF resumed.  Drains: Gtube to gravity.  Labs: No replacements. Hgb low but stable at 7.2. BNP 29,952.  Plan: Diurese. Possible bronch early to mid week.    Goal Outcome Evaluation:    Plan of Care Reviewed With: patient

## 2022-09-10 NOTE — H&P
[facility]    History and Physical - Medicine Service, MAROON TEAM        Date of Admission:  9/9/2022    Assessment & Plan      Sofie Rodriguez is a 62 year old female with pertinent hx of end stage COPD s/p bilateral sequential lung transplant (6/22) on triple IS ( MMF/Tacro/pred), pyloric ulcer, recent ERCP c/f hemobilia, gastroparesis s/p GJ tube placement and Percutaneous J tube presents for a planned admission from transplant pulmonology to work up antibody medicated rejection versus infection.     # End stage COPD s/p bilateral sequential lung transplant (6/22)   # hx of Bilateral hydrothorax after tx  # persistent hypercapnia   # Mycobacterium peregrinum colonization     Baseline oxygen need - 1L NC.   Recent imaging - 9/8/22 CT chest - R>L pleural effusions with bilateral hazy                              nodular opacities and paraseptal thickening.   PFT - FEV1 40%, FVC - 33% FEV1/FVC 79%  Immune suppression - Tacrolimus 3.5 mg BID Goal level 8-12,  mg                                         BID ( decreased dose in the setting of GI symptoms),                                        prednisone taper currently on 10 mg BID until 9/15/22.  CMV ppx - Valgancyclovir 450 mg TID ( ending 9/28)   Oral candida ppx - Nystatin   Diuresis -  PTA Furosemide 20mg once  Pain - PTA PRN oxycodone ordered   Spoke to pulmonology fellow about patient.     - Formal tx pulmonology consult placed.   - Per 9/8 pulm note planned admission for Bronch BAL likely 9/10.   - Patient NPO for procedure   - AM Tacrolimus level ordered  - Increased PTA furosemide to BID dosing   - Consider CAP coverage in the AM.     # >3 X Loose stool, abd pain c/f C diff infection vs MMF side effect   # hx of C diff   Patient is on MMF with chronic loose stools at baseline. Notes reflect recent dose reduction of MMF for this. Patient reports acute worsening of C diff in the past 2-3 days clear  stools and abdominal pain.   DDx - C diff versus MMF side effect vs wide infectious bacterial and viral etiology. Also considered cathartic effect of blood in the small bowel lumen.   - C diff toxin B PCR ordered  - if negative consider enteric bacteria viral panel in the AM     # Pyloric ulcer EGD 8/3/2022  # Acute on chronic macrocytic anemia    hx of coffee ground emesis. Patient had a EGD 8/3 which showed a Clear base ulcer in the pyloric channel identified as the culprit lesion. At the time was also noted to have excess gastric accumulation 2/2 pyloric inflammation. Vent G tube placed to allow for drainage. Vented to gravity now.  repeat EGD planned in October to check healing.  Patient's hgb hit loco 6.3 9/1 and since then in the 7s-8s. 7.1 this admission.   - Pantoprazole 40 mg BID     - patient consented for blood transfusion, type and screen ordered.   - if AM hgb acute drop consider luminal GI consult     # extra hepatic and intrahepatic biliary dilation c/f hemobilia   # Intra ductal papillary mucinous neoplasm   ERCP 8/11 showed hemobilia.   - Monitor LFTs     #  CKD G3   Patient has been variable GFRs 30-50s in the last few months. Most recent Cr trend 1.5-1.9 1.56 9/8/22.   - CTM     # Nutrition   # severe gastroparesis s/p  GJ tube placement 7/27/2022  - RD nutrition consult placed for TF  - Percutaneous j tube in place with G venting to gravity     #HTN  # HFpEF   # A flutter with RVR/SVT   Echo 8/15/22 - EF 60-65% with trace mitral insufficiency. Has recently completed zio patch. Admission pro BNP elevated to 30K.   - Continue PTA metoprolol 50 mg BID        Diet:   NPO   DVT Prophylaxis: None in the setting of GI bleed.  Dong Catheter: Not present  Fluids: Home TF running no fluids   Central Lines: None  Cardiac Monitoring: None  Code Status:  Full code     Clinically Significant Risk Factors Present on Admission         # Hypernatremia: Na = 145 mmol/L (Ref range: 136 - 145 mmol/L) on admission,  "will monitor as appropriate            # Overweight: Estimated body mass index is 28.66 kg/m  as calculated from the following:    Height as of 9/1/22: 1.6 m (5' 3\").    Weight as of this encounter: 73.4 kg (161 lb 12.8 oz).        Disposition Plan   3-4 days pending clinical stability      The patient's care was discussed with the Attending Physician, Dr. Greg Pritchard.    Hazel Pittman MD  Medicine Service, [facility]  Securely message with the Vocera Web Console (learn more here)  Text page via [facility] Paging/Directory   Please see signed in provider for up to date coverage information    ______________________________________________________________________    Chief Complaint   Planned admission    History is obtained from the patient    History of Present Illness   Sofie Rodriguez is a 62 year old female who presents with 5-6 nights of small volume blood tinged sputum production. Also reports wakes up at night breathless. Usually on 1L NC and recently had to increase that to 2-3 L during the day and night. In addition also reports 2-3 days of abdominal pain, loose watery stools and incontinence. Reports hx of c diff during the hospital admission for which patient was treated. Loose semi formed stool at baseline which changed 2-3 days ago into watery stools.     Seen by [facility] pulmonology Kaylin Triana 9/8 and had PFT/CT chest done. At which point was advised to present for further work up infection versus antibody mediated rejection.     Review of Systems    The 10 point Review of Systems is negative other than noted in the HPI or here.     Past Medical History    I have reviewed this patient's medical history and updated it with pertinent information if needed.   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  Past Surgical History:   Procedure Laterality Date     BRONCHOSCOPY (RIGID OR FLEXIBLE), DIAGNOSTIC N/A 8/2/2022    Procedure: BRONCHOSCOPY, DIAGNOSTIC- inspection Bronch;  Surgeon: Kamala Lovell MD;  Location:  GI     BRONCHOSCOPY FLEXIBLE AND RIGID N/A 07/19/2022    Procedure: BRONCHOSCOPY inspection only;  Surgeon: Bob Liao MD;  Location:  GI     COLONOSCOPY  2015     CV CORONARY ANGIOGRAM N/A 06/30/2021    Procedure: CV CORONARY ANGIOGRAM;  Surgeon: Alexander Cuellar MD;  Location:  HEART CARDIAC CATH LAB     CV RIGHT HEART CATH MEASUREMENTS RECORDED N/A 06/30/2021    Procedure: CV RIGHT HEART CATH;  Surgeon: Alexander Cuellar MD;  Location:  HEART CARDIAC CATH LAB     ENDOSCOPIC RETROGRADE CHOLANGIOPANCREATOGRAM N/A 8/11/2022    Procedure: ENDOSCOPIC RETROGRADE CHOLANGIOPANCREATOGRAPHY WITH PANCREATIC DUCT NEEDLE KNIFE AND STENT PLACEMENT, BILE DUCT SPHINCTEROTOMY, BLOOD/DEBRIS REMOVAL AND STENT PLACEMENT;  Surgeon: Cosmo Arroyo MD;  Location:  OR     ENT SURGERY  1974    tonsillectomy     ENTEROSCOPY SMALL BOWEL N/A 8/11/2022    Procedure: SMALL BOWEL ENTEROSCOPY;  Surgeon: Cosmo Arroyo MD;  Location:  OR     ESOPHAGOGASTRODUODENOSCOPY, WITH NASOGASTRIC TUBE INSERTION N/A 07/01/2022    Procedure: ESOPHAGOGASTRODUODENOSCOPY, WITH NASOJEJUNAL TUBE INSERTION;  Surgeon: Ozzy Nickerson MD;  Location:  GI     ESOPHAGOSCOPY, GASTROSCOPY, DUODENOSCOPY (EGD), COMBINED N/A 8/3/2022    Procedure: ESOPHAGOGASTRODUODENOSCOPY (EGD);  Surgeon: Ira Andres MD;  Location:  GI     HAND SURGERY       IR GASTRO JEJUNOSTOMY TUBE CHANGE  8/31/2022     IR GASTRO JEJUNOSTOMY TUBE PLACEMENT  7/27/2022     IR THORACENTESIS   2022     LEEP TX, CERVICAL  2017    HECTOR III     LYMPH NODE BIOPSY Left     Left axilla, benign- [place]     MIDLINE INSERTION - DOUBLE LUMEN Left 2022    20cm, Basilic vein     THORACENTESIS Left 2022    Procedure: THORACENTESIS;  Surgeon: Bo Capone PA-C;  Location: [facility] OR     THROMBECTOMY UPPER EXTREMITY Left 2022    Procedure: LEFT RADIAL ARM THROMBECTOMY;  Surgeon: Christie Graham MD;  Location: [facility] OR     TRANSPLANT LUNG RECIPIENT SINGLE X2 Bilateral 2022    Procedure: Clamshell Incision, Bilateral Sequential Lung Transplant, On Cardiopulmonary Bypass, Flexible Bronchoscopy;  Surgeon: Sue Sunsihne MD;  Location: [facility] OR        Social History   I have reviewed this patient's social history and updated it with pertinent information if needed. Sofie Rodriguez  reports that she quit smoking about 21 months ago. Her smoking use included cigarettes. She quit after 30.00 years of use. She has never used smokeless tobacco. She reports previous alcohol use. She reports previous drug use. Drugs: Marijuana and Methamphetamines.    Family History     No significant family history.    Prior to Admission Medications   Prior to Admission Medications   Prescriptions Last Dose Informant Patient Reported? Taking?   Multiple Vitamins-Minerals (MULTIVITAMINS W/MINERALS) liquid   No No   Sig: 15 mLs by Per J Tube route daily   Nutritional Supplements (GLUCOSE MANAGEMENT) TABS   No No   Sig: Take 3-4 tablets by mouth as needed (hypoglycemia) Pharmacist may change instructions to fit whatever glucose tab product they have in stock.   Sharps Container MISC   No No   Si sharps container   acetaminophen (TYLENOL) 160 MG/5ML liquid   No No   Sig: Take 31.25 mLs (1,000 mg) by mouth 2 times daily   alcohol swab prep pads   No No   Sig: Use to swab area of injection/amos as directed.   aspirin (ASA) 81 MG EC tablet   No No   Sig: Take 1 tablet (81 mg) by mouth daily   blood  glucose (CONTOUR NEXT TEST) test strip   No No   Sig: Use to test blood sugar 4 times daily, as directed.   blood glucose (NO BRAND SPECIFIED) lancets standard   No No   Sig: Use to test blood sugar 4 times daily or as directed.   blood glucose monitoring (CONTOUR NEXT MONITOR W/DEVICE KIT) meter device kit   No No   Sig: Use to test blood sugar 4 times daily or as directed.   calcium carbonate 600 mg-vitamin D 400 units (CALTRATE) 600-400 MG-UNIT per tablet   No No   Si tablet by Per J Tube route 2 times daily (with meals)   furosemide (LASIX) 20 MG tablet   Yes No   Sig: Take 1 tablet (20 mg) by mouth daily   insulin aspart (NOVOLOG PEN) 100 UNIT/ML pen   No No   Sig: Inject 1-12 Units Subcutaneous every 4 hours   insulin glargine (LANTUS PEN) 100 UNIT/ML pen   No No   Sig: Inject 7 Units Subcutaneous 2 times daily   insulin pen needle (31G X 5 MM) 31G X 5 MM miscellaneous   No No   Sig: Use one needle daily, as directed   loperamide (IMODIUM A-D) 2 MG tablet   No No   Sig: Take 1 tablet (2 mg) by mouth 4 times daily as needed for diarrhea   metoprolol tartrate (LOPRESSOR) 50 MG tablet   No No   Si tablet (50 mg) by Per Feeding Tube route 2 times daily   mycophenolate (GENERIC EQUIVALENT) 200 MG/ML suspension   No No   Sig: 3.75 mLs (750 mg) by Per J Tube route 2 times daily   nystatin (MYCOSTATIN) 292223 UNIT/ML suspension   No No   Sig: Swish and swallow 10 mLs (1,000,000 Units) in mouth 4 times daily   ondansetron (ZOFRAN ODT) 4 MG ODT tab   No No   Sig: Take 1 tablet (4 mg) by mouth every 6 hours as needed for nausea or vomiting   oxyCODONE (ROXICODONE) 5 MG tablet   No No   Si tablet (5 mg) by Per J Tube route every 8 hours as needed for breakthrough pain   pantoprazole (PROTONIX) 2 mg/mL SUSP suspension   No No   Si mLs (40 mg) by Per J Tube route 2 times daily   predniSONE (DELTASONE) 5 MG tablet   No No   Si tablets (10 mg) by Per J Tube route 2 times daily Start the evening of ;  Taper to 10 mg twice a day starting  with next taper due 9/15.   protein modular (PROSOURCE TF) LIQD   No No   Si packet by Per Feeding Tube route daily   tacrolimus (GENERIC EQUIVALENT) 1 mg/mL suspension   No No   Sig: 3.5 mLs (3.5 mg) by Per J Tube route 2 times daily   valGANciclovir (VALCYTE) 50 MG/ML solution   No No   Si mLs (450 mg) by Oral or Feeding Tube route three times a week      Facility-Administered Medications: None     Allergies   Allergies   Allergen Reactions     Blood Transfusion Related (Informational Only) Other (See Comments)     Patient has a history of a clinically significant antibody against RBC antigens.  A delay in compatible RBCs may occur.        Physical Exam   Vital Signs: Temp: 98.3  F (36.8  C) Temp src: Oral BP: (!) 140/84 Pulse: 99   Resp: 16 SpO2: 99 % O2 Device: Nasal cannula Oxygen Delivery: 2 LPM  Weight: 161 lbs 12.8 oz    General Appearance: NAD  Eyes: Manolo PERRL  HEENT: Neck MANOLO  Respiratory: Bilateral crackles heard  Cardiovascular: regular rate S1 and S2 heard Diastolic murmur heard  GI: Non distended abdomen but tender diffusely to palpation   Skin: No skin findings notable   Musculoskeletal: Bilateral trace pedal edema   Psychiatric: AO 3  Data   Data reviewed today: I reviewed all medications, new labs and imaging results over the last 24 hours. I personally reviewed no images or EKG's today.    Recent Labs   Lab 22  2101 22  1811 22  0737 22  0858 22  1039   WBC  --  8.6  --  7.7 10.6   HGB  --  7.1*  --  7.7* 8.0*   MCV  --  103*  --  103* 101*   PLT  --  305  --  285 230   NA  --   --  145 144 144   POTASSIUM  --   --  3.7 3.8 4.5   CHLORIDE  --   --  96* 93* 96*   CO2  --   --  40* 43* 38*   BUN  --   --  82.3* 90.3* 63.9*   CR  --   --  1.56* 1.90* 1.71*   ANIONGAP  --   --  9 8 10   ESTUARDO  --   --  9.4 9.4 9.4   *  --  177* 87 161*   ALBUMIN  --   --   --  3.4*  --    PROTTOTAL  --   --   --  6.0*  --    BILITOTAL   --   --   --  0.2  --    ALKPHOS  --   --   --  98  --    ALT  --   --   --  14  --    AST  --   --   --  16  --

## 2022-09-10 NOTE — PROGRESS NOTES
[facility]    Medicine Progress Note - Hospitalist Service, GOLD TEAM 10    Date of Admission:  9/9/2022    Assessment & Plan        Sofie Rodriguez is a 62 year old female with pertinent hx of end stage COPD s/p bilateral sequential lung transplant (6/22) on triple IS ( MMF/Tacro/pred), pyloric ulcer, recent ERCP c/f hemobilia, gastroparesis s/p GJ tube placement and Percutaneous J tube presents for a planned admission from transplant pulmonology to work up antibody medicated rejection versus infection.     Today:  Likely fluid overload contributing to acute respiratory failure.  Needs diuresis before bronch can be done.  Still no evidence of significant sepsis.    Today's Changes:  1. Increase diuresis   Furosemide 40 IV given once today after oral dose this AM   Begin IV daily and monitor for additional need   Monitor electrolytes closely  2. Strict I/s/Os  3. Low threshold for initiating empiric antibiotics  4. Restart tube feedings as no bronchoscopy today  5. 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   Recent imaging - 9/8/22 CT chest - R>L pleural effusions with bilateral hazy                              nodular opacities and paraseptal thickening.   PFT - FEV1 40%, FVC - 33% FEV1/FVC 79%  Immune suppression - Tacrolimus 3.5 mg BID Goal level 8-12,  mg                                         BID ( decreased dose in the setting of GI symptoms),                                        prednisone taper currently on 10 mg BID until 9/15/22.  CMV ppx - Valgancyclovir 450 mg TID ( ending 9/28)   Oral candida ppx - Nystatin   Diuresis -  PTA Furosemide 20mg once  Pain - PTA PRN oxycodone ordered   Spoke to pulmonology fellow about patient.      - Formal tx pulmonology consult placed.   - Per 9/8 pulm note planned admission for Bronch BAL likely 9/10.   - Patient NPO for procedure   - AM Tacrolimus level ordered  - Increased PTA furosemide to BID dosing   - Consider CAP coverage in the AM.     # >3 X Loose stool, abd pain c/f C diff infection vs MMF side effect   # hx of C diff   Patient is on MMF with chronic loose stools at baseline. Notes reflect recent dose reduction of MMF for this. Patient reports acute worsening of C diff in the past 2-3 days clear stools and abdominal pain.   DDx - C diff versus MMF side effect vs wide infectious bacterial and viral etiology. Also considered cathartic effect of blood in the small bowel lumen.   - C diff toxin B PCR ordered  - if negative consider enteric bacteria viral panel in the AM     # Pyloric ulcer EGD 8/3/2022  # Acute on chronic macrocytic anemia    hx of coffee ground emesis. Patient had a EGD 8/3 which showed a Clear base ulcer in the pyloric channel identified as the culprit lesion. At the time was also noted to have excess gastric accumulation 2/2 pyloric inflammation. Vent G tube placed to allow for drainage. Vented to gravity now.  repeat EGD planned in October to check healing.  Patient's hgb hit loco 6.3 9/1 and since then in the 7s-8s. 7.1 this admission.   - Pantoprazole 40 mg BID     - patient consented for blood transfusion, type and screen ordered.   - if AM hgb acute drop consider luminal GI consult     # extra hepatic and intrahepatic biliary dilation c/f hemobilia   # Intra ductal papillary mucinous neoplasm   ERCP 8/11 showed hemobilia.   - Monitor LFTs     #  CKD G3   Patient has been variable GFRs 30-50s in the last few months. Most recent Cr trend 1.5-1.9 1.56 9/8/22.   - CTM     # Protein-calorie malnutrition  # severe gastroparesis s/p  GJ tube placement 7/27/2022  - RD nutrition consult placed for TF  - Percutaneous j tube in place with G venting to gravity     #HTN  # HFpEF   # A  "flutter with RVR/SVT   Echo 8/15/22 - EF 60-65% with trace mitral insufficiency. Has recently completed zio patch. Admission pro BNP elevated to 30K.   - Continue PTA metoprolol 50 mg BID          Diet: Adult Formula Drip Feeding: Continuous Novasource Renal; Jejunostomy; Goal Rate: 35 ml/hr--okay to resume at goal; mL/hr; Medication - Feeding Tube Flush Frequency: At least 15-30 mL water before and after medication administration and with tube c...    DVT Prophylaxis: Pneumatic Compression Devices  Dong Catheter: Not present  Central Lines: None  Cardiac Monitoring: None  Code Status: Full Code      Disposition Plan      Expected Discharge Date: 09/14/2022        Discharge Comments: bronch tuesday or wednesday        The patient's care was discussed with the Bedside Nurse, Patient and [facility] transplant Team.    Nathen Diggs MD  Hospitalist Service, GOLD TEAM [address] [place] [address]  Securely message with the Vocera Web Console (learn more here)  Text page via [facility] Paging/Directory   Please see signed in provider for up to date coverage information      Clinically Significant Risk Factors Present on Admission             # Hypoalbuminemia: Albumin = 3.1 g/dL (Ref range: 3.5 - 5.2 g/dL) on admission, will monitor as appropriate        # Overweight: Estimated body mass index is 28.5 kg/m  as calculated from the following:    Height as of 9/1/22: 1.6 m (5' 3\").    Weight as of this encounter: 73 kg (160 lb 14.4 oz).        ______________________________________________________________________    Interval History   Slept ok overnight.  Breathing a little better.  Oxygen need down slightly (2L -> 1.5L).  Feeling hungry.    Data reviewed today: I reviewed all medications, new labs and imaging results over the last 24 hours.    Physical Exam   Vital Signs: Temp: 98.5  F (36.9  C) Temp src: Oral BP: 133/76 Pulse: 77   Resp: 20 SpO2: 99 % O2 Device: Nasal cannula Oxygen Delivery: 2 " LPM  Weight: 160 lbs 14.4 oz  Constitutional: awake, alert, cooperative, mild respiratory distress  Respiratory: Some increased work of breathing, good air exchange, diffuse crackles noted.  No wheezes.  Cardiovascular: Regular rate and rhythm, normal S1 and S2, and no murmur noted  GI: Normal bowel sounds, soft, non-distended, non-tender  Neuropsychiatric: General: calm and normal eye contact  Affect: normal and pleasant    Data   No results found for this or any previous visit (from the past 24 hour(s)).

## 2022-09-10 NOTE — UTILIZATION REVIEW
[facility] Utilization Review  Admission Status; Secondary Review Determination     Admission Date: 9/9/2022  9:35 PM      Under the authority of the Utilization Management Committee, the utilization review process indicated a secondary review on the above patient.  The review outcome is based on review of the medical records, discussions with staff, and applying clinical experience noted on the date of the review.        (X)      Inpatient Status Appropriate - This patient's medical care is consistent with medical management for inpatient care and reasonable inpatient medical practice.          RATIONALE FOR DETERMINATION   Sofie Rodriguez is a 62 year old female with complex past medical history including and stage COPD, sequential lung transplant, on triple immunosuppression therapy, gastroparesis, GJ tube placement, who is admitted with progressive respiratory failure and hypoxia with suspected fluid overload/pulmonary edema versus transplant rejection.  She has persistent hypercapnia, protein calorie malnutrition and respiratory failure.  She is started on extensive infectious work-up with planned bronchoscopy with bronchoalveolar lavage..  She is being worked up for infectious etiologies while being diuresed.  Pulmonary consultation is obtained.  Cares are rendered complex due to immunosuppressed status, bilateral lung transplant and ongoing hypoxic respiratory failure requiring extensive infectious work-up and management including bronchoalveolar lavage, with high risk of adverse outcome.  Anticipated length of stay is more than 2 midnights and with complexity of care and high risk of adverse outcome, patient is appropriately admitted as inpatient.           The definitions of Inpatient Status and Observation Status used in making the determination above are those provided in the CMS Coverage Manual, Chapter 1 and Chapter 6, section 70.4.      Sincerely,       Pam Lr MD, MS  Physician  Advisor  Utilization Review-[place]    Phone: [phone]

## 2022-09-10 NOTE — PROGRESS NOTES
9/10/2022 - 5:56 PM    Brief Hospitalist Cross Cover Note    Issue:  OTHER: insulin    /76 (BP Location: Right arm)   Pulse 77   Temp 98.5  F (36.9  C) (Oral)   Resp 20   Wt 73 kg (160 lb 14.4 oz)   SpO2 99%   BMI 28.50 kg/m    Not otherwise examined    Assessment/Plan:  Sliding scale insulin(SSI) started [id number]    Jcarlos Myles MD

## 2022-09-10 NOTE — CONSULTS
[facility]  Lung Transplant Consult  September 10, 2022      Sofie Rodriguez MRN# [id number]   Age: 62 year old YOB: 1960     Date of Admission:  9/9/2022        Primary care provider: Kaylin Triana  Transplants:  6/28/2022 (Lung), Postoperative day:  74       Assessment and Plan:   Sofie Rodriguez is a 62-year-old female status post bilateral lung transplantation on 6/28/2022 for COPD complicated by bilateral pleural effusions, left radial artery thrombosis (secondary to arterial line) requiring thrombectomy, acute kidney injury, C. difficile colitis, gastroparesis requiring GJ tube placement (7/27/2022) GI bleed secondary to pyloric ulcer, hemobilia status post ERCP and MRCP (August 2022), persistent bilateral pleural collections (possibly related to size mismatch), persistent vasoplegia, persistent CO2 retention and right hemidiaphragm palsy.  Pretransplant history significant for hypertension, HFpEF, NTM, hepatitis C and previous methamphetamine use.  Patient presents now with 10 days of increased shortness of breath and dyspnea on exertion with mild intermittent dyspnea at rest, increased with any activity, short of breath even walking in the room, significantly different than a few weeks ago, daily hemoptysis in the early a.m. and increased lower extremity edema.  Progressive decline in PFTs since mid August.  Peak PFTs with FEV1 (1.22) and FVC (1.33) on 8/18.  Most recent PFTs on 9/7 with FEV1 0.98 and FVC 1.01. Chest CT from 9/8 reviewed by me and compared to 7/28.  Persistent bilateral pleural effusions, right greater than left, present at the apices, in the fissure on the right and loculated appearing collections at both bases, increased from previous.  Diffuse bilateral groundglass changes more prominent in the lower lung fields in the apices.  More focal appearing infiltrates in the left lower lobe.  Labs notable for creatinine 1.45, improved from  earlier in the week but still above baseline, bicarb elevated at 48, BNP elevated at 29,952, normal WBC at 7.7, hemoglobin low at 7.2 with recent baseline around 8.5.    Pulmonary/lung transplant: The patient presents with increased shortness of breath, dyspnea with minimal exertion and small volume daily hemoptysis.  Marked decline in PFTs.  Chest CT with increased effusions and groundglass changes.  DSA notable for new DQB2 starting 8/10 and gradually increasing from 2155 to 7033 with a small decrease to 5744 on 9/7.  Differential diagnosis for the patient's presentation includes volume overload, acute cellular rejection, antibody mediated rejection (MAR), alveolar hemorrhage or infection.  Volume overload is supported by the CT appearance, the increased lower extremity edema and the markedly elevated BNP.  Symptoms and CT appearance could be consistent with acute cellular rejection which is not uncommon at this point following transplantation.  AMR is supported by the new and rising DSA temporally correlating with new symptoms.  The patient's a CT scan could be consistent with alveolar hemorrhage, further supported by the patient's intermittent small-volume hemoptysis.  This is occasionally (rarely) seen as an acute rejection variant.  With the intensive immunosuppression, infection needs to be considered although is less consistent with patient presentation.  It is likely this is multifactorial with 2 or more of the above-noted etiologies occurring simultaneously.  Recommend diuresis, aiming for 500-1000 mL out greater than in each day.  Close laboratory monitoring.  When volume status is optimized, will pursue bronchoscopy in an effort to differentiate among the other potential etiologies.  Ideally biopsies will be pursued but clinical status does not permit biopsies to be performed safely.  Hold aspirin for possible biopsies.  Serial lavages should be performed in an effort to assess for diffuse alveolar  hemorrhage.  Empiric treatment for AMR and or ACR will depend on the clinical course.  Provide supplemental oxygen as needed to maintain saturation greater than 92%.    Lung transplantation/immunosuppression: Continue current tacrolimus, mycophenolate Moffa Ronak and prednisone.  Tacrolimus should be adjusted to maintain a level of 8-12.  Continue CellCept at 750 mg twice daily.  Continue current prednisone with next taper due on 9/15.  All immunosuppression should be administered through the jejunal tube.      Date AM dose (mg) PM dose (mg)   8/18/22 10 10   9/15/22 10 7.5   10/13/22 7.5 7.5   11/10/22 7.5 5   12/8/22 5 5   1/5/23 5 2.5         Prophylaxis: Bactrim was stopped due to hyperkalemia, dapsone stopped due to anemia, pentamadine was administered on 9/7 but complicated by severe cough.  Long-term PJP prophylaxis determines based on clinical course.  Valganciclovir should continue daily 9/28 although may need to be extended if immunosuppression is intensified.  Continue nystatin.  Low-level EBV viremia, continue monitoring monthly.    Diarrhea: Etiology is unclear.  Recommend checking C. difficile PCR and full enteric battery.  This may be related to CellCept but unable to switch to Myfortic as the patient does not tolerate oral intake and reluctant to switch to azathioprine until etiology of dyspnea is clarified.    Hypogammaglobulinemia: Previously received IgG replacement on 7/30.  Repeat level on 8/29 was adequate so no replacement was required.  Continue checking monthly.    Acute kidney injury: Patient required a therapeutic dose of tacrolimus.  A trial of diuresis will be pursued.  Avoid other nephrotoxins.    History of GI bleed: Continue twice daily pantoprazole.    Nutrition: Continue current tube feeding support.      Ajay Castillo MD                   Chief Complaint:     Shortness of breath, cough and small-volume hemoptysis         History of Present Illness:     History obtained from  the patient and the medical record.    Sofie Rodriguez is a 62-year-old female status post bilateral lung transplantation on 6/28/2022 for COPD complicated by bilateral pleural effusions, left radial artery thrombosis (secondary to arterial line) requiring thrombectomy, acute kidney injury, C. difficile colitis, gastroparesis requiring GJ tube placement (7/27/2022) GI bleed secondary to pyloric ulcer, hemobilia status post ERCP and MRCP (August 2022), persistent bilateral pleural collections (possibly related to size mismatch), persistent vasoplegia, persistent CO2 retention and right hemidiaphragm palsy.  Pretransplant history significant for hypertension, HFpEF, NTM, hepatitis C and previous methamphetamine use.  At the end of August she underwent thoracentesis but they were unable to remove significant fluid complicated by bleeding.  She noted increased dyspnea on exertion at the end of September and noted to have decreased PFTs.  Concern for volume overload, diuresis was initiated but the patient was noted to have a bump in her creatinine.  The patient reports increased shortness of breath for the last 7-10 days.  She is also noted nighttime desaturation.  She reports coughing up a small amount of blood which occurs mostly in the middle of the night, typically between midnight and 3 AM.  Usually about a half a teaspoon with some mucus.  She has not coughed up blood during the day.  She reports dyspnea at rest which varies throughout the day, increased with any activity.  She reports she is now short of breath even walking in the room.  It developed about a week ago but has not progressed significantly since then but she remains very limited.  Occasional throat clearing productive of clear sputum.  Occasional fleeting pleuritic chest pain.  No fever, chills or night sweats.        Review of Systems:                     Appetite is poor, dependent on tube feeding.  No ear pain, sore throat, sinus pain or  rhinorrhea  No visual changes  Increased lower extremity swelling for the past 10 days with some lower extremity tenderness.  No palpitations  No nausea or vomiting.  Mild diffuse abdominal tenderness but no pain if abdomen is not palpated.  Loose stool 4-5 times per day.  She reports soreness of her buttocks due to the frequent diarrhea.  Easy bruising  The remainder of a complete review of systems is otherwise negative except as noted in the history of present illness.         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     Hepatitis 2017    Hep C, [facility]     HTN (hypertension)      Osteopenia      Past Surgical History:   Procedure Laterality Date     BRONCHOSCOPY (RIGID OR FLEXIBLE), DIAGNOSTIC N/A 8/2/2022    Procedure: BRONCHOSCOPY, DIAGNOSTIC- inspection Bronch;  Surgeon: Kamala Lovell MD;  Location:  GI     BRONCHOSCOPY FLEXIBLE AND RIGID N/A 07/19/2022    Procedure: BRONCHOSCOPY inspection only;  Surgeon: Bob Liao MD;  Location:  GI     COLONOSCOPY  2015     CV CORONARY ANGIOGRAM N/A 06/30/2021    Procedure: CV CORONARY ANGIOGRAM;  Surgeon: Alexander Cuellar MD;  Location:  HEART CARDIAC CATH LAB     CV RIGHT HEART CATH MEASUREMENTS RECORDED N/A 06/30/2021    Procedure: CV RIGHT HEART CATH;  Surgeon: Alexander Cuellar MD;  Location:  HEART CARDIAC CATH LAB     ENDOSCOPIC RETROGRADE CHOLANGIOPANCREATOGRAM N/A 8/11/2022    Procedure: ENDOSCOPIC RETROGRADE CHOLANGIOPANCREATOGRAPHY WITH PANCREATIC DUCT NEEDLE KNIFE AND STENT PLACEMENT, BILE DUCT SPHINCTEROTOMY, BLOOD/DEBRIS REMOVAL AND STENT PLACEMENT;  Surgeon: Cosmo Arroyo MD;  Location: [facility]     ENT SURGERY  1974    tonsillectomy     ENTEROSCOPY SMALL BOWEL N/A 8/11/2022    Procedure: SMALL BOWEL ENTEROSCOPY;   Surgeon: Cosmo Arroyo MD;  [facility]     ESOPHAGOGASTRODUODENOSCOPY, WITH NASOGASTRIC TUBE INSERTION N/A 2022    Procedure: ESOPHAGOGASTRODUODENOSCOPY, WITH NASOJEJUNAL TUBE INSERTION;  Surgeon: Ozzy Nickerson MD;  Location:  GI     ESOPHAGOSCOPY, GASTROSCOPY, DUODENOSCOPY (EGD), COMBINED N/A 8/3/2022    Procedure: ESOPHAGOGASTRODUODENOSCOPY (EGD);  Surgeon: Ira Andres MD;  Location:  GI     HAND SURGERY       IR GASTRO JEJUNOSTOMY TUBE CHANGE  2022     IR GASTRO JEJUNOSTOMY TUBE PLACEMENT  2022     IR THORACENTESIS  2022     LEEP TX, CERVICAL  2017    HECTOR III     LYMPH NODE BIOPSY Left     Left axilla, benign- [place]     MIDLINE INSERTION - DOUBLE LUMEN Left 2022    20cm, Basilic vein     THORACENTESIS Left 2022    Procedure: THORACENTESIS;  Surgeon: Bo Capone PA-C;  Location: [facility] OR     THROMBECTOMY UPPER EXTREMITY Left 2022    Procedure: LEFT RADIAL ARM THROMBECTOMY;  Surgeon: Christie Graham MD;  Location:  OR     TRANSPLANT LUNG RECIPIENT SINGLE X2 Bilateral 2022    Procedure: Clamshell Incision, Bilateral Sequential Lung Transplant, On Cardiopulmonary Bypass, Flexible Bronchoscopy;  Surgeon: Sue Sunshine MD;  Location:  OR           Family History:     Grandpa - MI  Mom- Skin cancer, cervical cancer  Dad- Prostate cancer, carotid artery blockage/heart disease -  at age 82.  Brother- COPD (smoker)  Sister-healthy  Sister-healthy        Social History:     Social History     Socioeconomic History     Marital status: Single     Spouse name: Not on file     Number of children: Not on file     Years of education: Not on file     Highest education level: Not on file   Occupational History     Not on file   Tobacco Use     Smoking status: Former Smoker     Years: 30.00     Types: Cigarettes     Quit date: 2020     Years since quittin.8     Smokeless tobacco: Never  Used   Substance and Sexual Activity     Alcohol use: Not Currently     Drug use: Not Currently     Types: Marijuana, Methamphetamines     Comment: hx:marijuana and methamphetamine-quit both unsure ?  2-3 yrs ago     Sexual activity: Not on file   Other Topics Concern     Parent/sibling w/ CABG, MI or angioplasty before 65F 55M? Not Asked   Social History Narrative     Not on file     Social Determinants of Health     Financial Resource Strain: Not on file   Food Insecurity: Not on file   Transportation Needs: Not on file   Physical Activity: Not on file   Stress: Not on file   Social Connections: Not on file   Intimate Partner Violence: Not on file   Housing Stability: Not on file            Allergies and Medications:     Allergies   Allergen Reactions     Blood Transfusion Related (Informational Only) Other (See Comments)     Patient has a history of a clinically significant antibody against RBC antigens.  A delay in compatible RBCs may occur.      Medications Prior to Admission   Medication Sig Dispense Refill Last Dose     acetaminophen (TYLENOL) 160 MG/5ML liquid Take 31.25 mLs (1,000 mg) by mouth 2 times daily 1200 mL 3      alcohol swab prep pads Use to swab area of injection/amos as directed. 100 each 6      aspirin (ASA) 81 MG EC tablet Take 1 tablet (81 mg) by mouth daily 30 tablet 11      blood glucose (CONTOUR NEXT TEST) test strip Use to test blood sugar 4 times daily, as directed. 400 strip 0      blood glucose (NO BRAND SPECIFIED) lancets standard Use to test blood sugar 4 times daily or as directed. 400 lancet 0      blood glucose monitoring (CONTOUR NEXT MONITOR W/DEVICE KIT) meter device kit Use to test blood sugar 4 times daily or as directed. 1 kit 0      calcium carbonate 600 mg-vitamin D 400 units (CALTRATE) 600-400 MG-UNIT per tablet 1 tablet by Per J Tube route 2 times daily (with meals) 60 tablet 11      furosemide (LASIX) 20 MG tablet Take 1 tablet (20 mg) by mouth daily 30 tablet 1       insulin aspart (NOVOLOG PEN) 100 UNIT/ML pen Inject 1-12 Units Subcutaneous every 4 hours 15 mL 0      insulin glargine (LANTUS PEN) 100 UNIT/ML pen Inject 7 Units Subcutaneous 2 times daily 15 mL 0      insulin pen needle (31G X 5 MM) 31G X 5 MM miscellaneous Use one needle daily, as directed 100 each 0      loperamide (IMODIUM A-D) 2 MG tablet Take 1 tablet (2 mg) by mouth 4 times daily as needed for diarrhea 90 tablet 3      metoprolol tartrate (LOPRESSOR) 50 MG tablet 1 tablet (50 mg) by Per Feeding Tube route 2 times daily 60 tablet 11      Multiple Vitamins-Minerals (MULTIVITAMINS W/MINERALS) liquid 15 mLs by Per J Tube route daily 450 mL 11      mycophenolate (GENERIC EQUIVALENT) 200 MG/ML suspension 3.75 mLs (750 mg) by Per J Tube route 2 times daily 225 mL 11      Nutritional Supplements (GLUCOSE MANAGEMENT) TABS Take 3-4 tablets by mouth as needed (hypoglycemia) Pharmacist may change instructions to fit whatever glucose tab product they have in stock. 120 tablet 1      nystatin (MYCOSTATIN) 621029 UNIT/ML suspension Swish and swallow 10 mLs (1,000,000 Units) in mouth 4 times daily 1200 mL 3      ondansetron (ZOFRAN ODT) 4 MG ODT tab Take 1 tablet (4 mg) by mouth every 6 hours as needed for nausea or vomiting 30 tablet 0      oxyCODONE (ROXICODONE) 5 MG tablet 1 tablet (5 mg) by Per J Tube route every 8 hours as needed for breakthrough pain 15 tablet 0      pantoprazole (PROTONIX) 2 mg/mL SUSP suspension 20 mLs (40 mg) by Per J Tube route 2 times daily 1200 mL 11      predniSONE (DELTASONE) 5 MG tablet 2 tablets (10 mg) by Per J Tube route 2 times daily Start the evening of 8/17; Taper to 10 mg twice a day starting 8/18 with next taper due 9/15. 120 tablet 3      protein modular (PROSOURCE TF) LIQD 1 packet by Per Feeding Tube route daily 30 packet 11      Sharps Container MISC 1 sharps container 1 each 0      tacrolimus (GENERIC EQUIVALENT) 1 mg/mL suspension 3.5 mLs (3.5 mg) by Per J Tube route 2 times  daily 210 mL 11      valGANciclovir (VALCYTE) 50 MG/ML solution 9 mLs (450 mg) by Oral or Feeding Tube route three times a week 118 mL 3          Physical Exam:   Temp:  [98.3  F (36.8  C)-98.7  F (37.1  C)] 98.7  F (37.1  C)  Pulse:  [66-99] 66  Resp:  [16-22] 20  BP: (122-140)/(56-84) 126/68  Cuff Mean (mmHg):  [92-93] 92  SpO2:  [95 %-100 %] 97 %    Intake/Output Summary (Last 24 hours) at 9/10/2022 1329  Last data filed at 9/10/2022 0932  Gross per 24 hour   Intake 140 ml   Output 550 ml   Net -410 ml       Constitutional:   Awake, alert and in no apparent distress     Eyes:   Nonicteric, AMINA     ENT:    oral mucosa moist without lesions     Neck:   Supple without supraclavicular or cervical lymphadenopathy     Lungs:   Good air flow.  Fine right mid and lower and left lower crackles. No rhonchi.  No wheezes.     Cardiovascular:   Normal S1 and S2.  RRR.  II/VI sys murmur. No gallop or rub.  Radial pulses normal and symmetric     Abdomen:   NABS, soft distended.  Mild diffuse tenderness. No HSM.     Musculoskeletal:   3+ edema. Strength 5/5 and symmetric     Neurologic:   Alert and conversant. Cranial nerves II-XII intact.       Skin:   Warm, dry.  No rash on limited exam.           Data:   All laboratory and imaging data reviewed.    CMP  Recent Labs   Lab 09/10/22  1110 09/10/22  0814 09/10/22  0712 09/09/22  2101 09/08/22  0737 09/07/22  0858   NA  --   --  144  --  145 144   POTASSIUM  --   --  4.0  --  3.7 3.8   CHLORIDE  --   --  95*  --  96* 93*   CO2  --   --  48*  --  40* 43*   ANIONGAP  --   --  1*  --  9 8   * 163* 184* 149* 177* 87   BUN  --   --  76.5*  --  82.3* 90.3*   CR  --   --  1.45*  --  1.56* 1.90*   GFRESTIMATED  --   --  41*  --  38* 30*   ESTUARDO  --   --  9.1  --  9.4 9.4   MAG  --   --  2.3  --   --  2.7*   PHOS  --   --  3.1  --   --   --    PROTTOTAL  --   --  5.5*  --   --  6.0*   ALBUMIN  --   --  3.1*  --   --  3.4*   BILITOTAL  --   --  0.2  --   --  0.2   ALKPHOS  --   --   88  --   --  98   AST  --   --  17  --   --  16   ALT  --   --  11  --   --  14     CBC  Recent Labs   Lab 09/10/22  0712 09/09/22  1811 09/07/22  0858   WBC 7.7 8.6 7.7   RBC 2.38* 2.39* 2.54*   HGB 7.2* 7.1* 7.7*   HCT 25.1* 24.6* 26.2*   * 103* 103*   MCH 30.3 29.7 30.3   MCHC 28.7* 28.9* 29.4*   RDW 19.5* 19.2* 18.4*    305 285     INRNo lab results found in last 7 days.  Arterial Blood GasNo lab results found in last 7 days.  Urine Studies    Recent Labs   Lab Test 08/01/22  0319 07/08/22  0831 07/05/22  1004 06/28/22  1309 06/14/21  0939   URINEPH 8.0* 5.5 5.5 5.0 5.0   NITRITE Negative Negative Negative Negative Negative   LEUKEST Negative Negative Negative Negative Trace*   WBCU  --  1 5 1 8*     CMV viral loads  No lab results found.  No results found for: CMQNT, CMVQ  EBV viral loads     Recent Labs   Lab Test 09/01/22  0852 08/24/22  0810   EBRES <500* <500*     EBV DNA Copies/mL   Date Value Ref Range Status   09/01/2022 <500 (A) Not Detected copies/mL Final     Comment:     EBV DNA Detected below the reportable range of 500 copies/mL   08/24/2022 <500 (A) Not Detected copies/mL Final     Comment:     EBV DNA Detected below the reportable range of 500 copies/mL     Respiratory Virus Testing    No results found for: RS, FLUAG   Sputum Cultures in the last 3 months:  Specimen Description   Date Value Ref Range Status   06/14/2021 Sputum  Final   06/14/2021 Sputum  Final   06/14/2021 Sputum  Final   06/14/2021 Sputum  Final   06/14/2021 Sputum  Final    Culture Micro   Date Value Ref Range Status   06/14/2021 Culture negative for acid fast bacilli  Final   06/14/2021   Final    Assayed at [facility] [address] [place] [id number] [phone]   06/14/2021 (A)  Final    >10 Squamous epithelial cells/low power field indicates oral contamination. Please   recollect.     06/14/2021 (A)  Final    >10 Squamous epithelial cells/low power field indicates oral contamination. Please    recollect.          Attestation:

## 2022-09-10 NOTE — PROGRESS NOTES
CLINICAL NUTRITION SERVICES - ASSESSMENT NOTE     Nutrition Prescription    RECOMMENDATIONS FOR MDs/PROVIDERS TO ORDER:  -- If loose stools persist, recommend switching liquid tylenol to a crushed tablet via Jtube or give orally if pt tolerates.     Malnutrition Status:    Patient does not meet two of the established criteria necessary for diagnosing malnutrition    Recommendations already ordered by Registered Dietitian (RD):  - Order baseline Phos and Mg, methylmalonic acid to better  B12 levels w/ macrocytic anemia.  - Resume TF per home regimen: Novasource Renal @ 35 ml/hr (840 ml) + 1 pkt Prosource  provides 1720 kcal (30), 85 g pro (1.5), 154 g CHO, 602 ml free water, and 0 g fiber daily.   - with current fluid retention, minimal water flushes of 30 ml q 4 hr (in addition to Rx w/ adequate FWF but < 150 ml at a time to avoid malabsorption of TF).   - Collaborate with other providers--bedside RN.    Future/Additional Recommendations:  - follow renal function, fluid balance and protein load.  - watch for methlymalonic acid lab result --r/o need for extra B12 supplementation  - Follow GI function--follow up with Rx adjustment to avoid hyperosmolar liquid Rx that might contribute to loose stools.   - discuss usual FWF family has been providing at home--provide education if needed on appropriate amounts needed w/ Rx to avoid clogging.      REASON FOR ASSESSMENT  Sofie Rodriguez is a/an 62 year old female assessed by the dietitian for Provider Order - Registered Dietitian to Assess and Order TF per Medical Nutrition Therapy Protocol.    PMH includes :hx of end stage COPD s/p bilateral sequential lung transplant (6/22) on triple IS ( MMF/Tacro/pred), pyloric ulcer (per 8/3/22 EGD), acute on chronic macrocytic anemia, recent ERCP 8/11 c/f hemobilia, gastroparesis (due to pyloric inflammation ), s/p GJ tube placement on 7/27/22(G for venting) and Percutaneous J tube presents for a planned admission from transplant  pulmonology to work up antibody medicated rejection versus infection. Also per H&P--CKD3, HTN, HFpEF, A flutter w/ RVR/SVT. Osteopenia.    Per chart, loose-semi formed stools at baseline on MMF with dose recently reduced.  Also has h/o C diff.     NUTRITION HISTORY  Pt started on TF 7/2 while in ICU.  Due to worsening renal function she was switched to a Novasource Renal @ Pt discharged from [facility] last month on Novasource Renal via J tube @ 35 ml/hr x 24 h + 1 pkt Prosource = 1720 calories, 87 g protein, 154 g cho, 602 ml free water, and 0 g fiber daily.  Free water was set at 30 ml q 4 hr with plan to adjust depending on oral fluid intake.  She was on a full liquid diet for comfort as pt has severe gastroparesis. OP RD was asked to call txp coordinator 8/23 as she was having diarrhea.  Per OP RD note, plan was for her to continue on TF until an EGD 10/6.  RD left vm for pt but no further notes that she was able to speak w/ pt.     G/J tube placed 7/27/22 and most recently exchanged 8/31/22 (18 Fr x 45 cm).     Per chart, worsening diarrhea over past 2-3 days w/ clear stools and abdominal pain.     Spoke with pt who noted that her adult children were taking turns staying with her and managing her TF.  Due to the recurrent clogging of the J tube, they had increased the amount of flushes they were using with her Rx.  High volume flushes via the J tube could contribute to loose stools (usually best to keep +/< 150 ml at a time) and possibly contributing to some fluid retention.     Pt notes lately she's mainly only been sipping on water and not tolerating or wanting or orally.     CURRENT NUTRITION ORDERS  Diet: NPO    LABS  Labs reviewed  9/10: K+ 4.0, BUN-76.5, Cr 1.45.   No Phos or Mg ordered yet this admission (later Mg-2.3, Phos 3.1.   LFTs wnl.  Glucose 184, 163 w/ am FSBG.   BNP 29,952   Hgb 7.2 with  w/ h/o macrocytic anemia.  B12- 536 8/24/22  Folate > 40 9/1/22   Iron 21 9/3/22  Ferritin 343  "9/3/22    GI BM x 3 per noc shift RN.  C diff negative.     MEDICATIONS  Medications reviewed  Tylenol solution 2x/d, calcium carbonate 600 ml w/ vitamin D (400 units) 2x/d orally, lasix, medium resistance novolog q 4 hr, 4 units lantus in AM,   multivit w/ minerals, SOT Rx.     ANTHROPOMETRICS  Height: 5'3\"  Most Recent Weight: 73 kg (160 lb 14.4 oz)    IBW: 52.3 kg (140% IBW)  BMI: Overweight BMI 25-29.9  Weight History:   Wt Readings from Last 20 Encounters:   09/10/22 73 kg (160 lb 14.4 oz)   09/01/22 70.3 kg (155 lb)   08/29/22 70.3 kg (155 lb)   08/24/22 70.3 kg (155 lb)   08/18/22 69.9 kg (154 lb)   08/16/22 70.5 kg (155 lb 6.4 oz) last wt of admission   08/06/22 67.4 kg (148 lb 11.2 oz)   07/25/22 72.5 kg (159 lb 14.4 oz)   06/30/22 75.6 kg (166 lb 10.7 oz)   06/28/22 65.7 kg (144 lb 14.4 oz) admission wt   04/29/22 63.5 kg (140 lb)   11/11/21 62.6 kg (138 lb)   06/30/21 67.4 kg (148 lb 11.2 oz)   06/30/21 61.2 kg (135 lb)   06/15/21 61.2 kg (135 lb)   03/26/21 57.8 kg (127 lb 6.4 oz)   02/03/21 54.4 kg (120 lb)   06/09/17 56.5 kg (124 lb 9 oz)       Dosing Weight: 57 kg (using 6/28 of 65.7kg as likely drier wt and IBW of 52.3 kg)    ASSESSED NUTRITION NEEDS  Estimated Energy Needs: 1425 - 1710 - 1995 kcals/day (25 - 30 - 35 kcals/kg)  Justification: Maintenance w/ increased needs   Estimated Protein Needs: 74 - 114 grams protein/day (1.3 - 2 grams of pro/kg)--pending renal tolerance  Justification: Increased needs s/p lung txp  Estimated Fluid Needs: 1425 - 1710 mL/day (25 - 30 mL/kg)   Justification: Maintenance and Per provider pending fluid status    PHYSICAL FINDINGS  See malnutrition section below.    MALNUTRITION  % Intake: No decreased intake noted--on TF  % Weight Loss: None noted  Subcutaneous Fat Loss: None observed  Muscle Loss: None observed  Fluid Accumulation/Edema: Mild  Malnutrition Diagnosis: Patient does not meet two of the established criteria necessary for diagnosing " malnutrition    NUTRITION DIAGNOSIS  Altered GI function related to gastroparesis due to pyloric inflammation/ulceration as evidenced by need for permanent FT with TF meeting 100% of estimated needs.       INTERVENTIONS  Implementation  Nutrition Education: Discussed plan to resume TF and plan to discuss water flushes with family helping with TF management to ensure appropriate volume.   Collaboration with other providers  Enteral Nutrition - Initiate  Feeding tube flush     Goals  Total avg nutritional intake to meet a minimum of 25 kcal/kg and 1.3 g PRO/kg daily (per dosing wt 57 kg).     Monitoring/Evaluation  Progress toward goals will be monitored and evaluated per protocol.    Nguyen De La Fuente RD, LD   RD Weekend/Holiday Pager: [phone]    Mon-Fri: 6C RD pager: [phone]

## 2022-09-11 LAB
ALBUMIN SERPL BCG-MCNC: 3.3 G/DL (ref 3.5–5.2)
ANION GAP SERPL CALCULATED.3IONS-SCNC: 2 MMOL/L (ref 7–15)
ANION GAP SERPL CALCULATED.3IONS-SCNC: 2 MMOL/L (ref 7–15)
BASE EXCESS BLDV CALC-SCNC: 21.8 MMOL/L (ref -7.7–1.9)
BASE EXCESS BLDV CALC-SCNC: 23.1 MMOL/L (ref -7.7–1.9)
BASE EXCESS BLDV CALC-SCNC: 26.3 MMOL/L (ref -7.7–1.9)
BUN SERPL-MCNC: 76 MG/DL (ref 8–23)
BUN SERPL-MCNC: 76 MG/DL (ref 8–23)
CALCIUM SERPL-MCNC: 9.2 MG/DL (ref 8.8–10.2)
CALCIUM SERPL-MCNC: 9.3 MG/DL (ref 8.8–10.2)
CHLORIDE SERPL-SCNC: 94 MMOL/L (ref 98–107)
CHLORIDE SERPL-SCNC: 94 MMOL/L (ref 98–107)
CREAT SERPL-MCNC: 1.53 MG/DL (ref 0.51–0.95)
CREAT SERPL-MCNC: 1.57 MG/DL (ref 0.51–0.95)
DEPRECATED HCO3 PLAS-SCNC: 48 MMOL/L (ref 22–29)
DEPRECATED HCO3 PLAS-SCNC: 49 MMOL/L (ref 22–29)
ERYTHROCYTE [DISTWIDTH] IN BLOOD BY AUTOMATED COUNT: 19.5 % (ref 10–15)
GFR SERPL CREATININE-BSD FRML MDRD: 37 ML/MIN/1.73M2
GFR SERPL CREATININE-BSD FRML MDRD: 39 ML/MIN/1.73M2
GLUCOSE BLDC GLUCOMTR-MCNC: 151 MG/DL (ref 70–99)
GLUCOSE BLDC GLUCOMTR-MCNC: 160 MG/DL (ref 70–99)
GLUCOSE BLDC GLUCOMTR-MCNC: 174 MG/DL (ref 70–99)
GLUCOSE BLDC GLUCOMTR-MCNC: 179 MG/DL (ref 70–99)
GLUCOSE BLDC GLUCOMTR-MCNC: 185 MG/DL (ref 70–99)
GLUCOSE BLDC GLUCOMTR-MCNC: 236 MG/DL (ref 70–99)
GLUCOSE SERPL-MCNC: 165 MG/DL (ref 70–99)
GLUCOSE SERPL-MCNC: 178 MG/DL (ref 70–99)
HCO3 BLDV-SCNC: 50 MMOL/L (ref 21–28)
HCO3 BLDV-SCNC: 50 MMOL/L (ref 21–28)
HCO3 BLDV-SCNC: 51 MMOL/L (ref 21–28)
HCT VFR BLD AUTO: 26.9 % (ref 35–47)
HGB BLD-MCNC: 7.8 G/DL (ref 11.7–15.7)
MCH RBC QN AUTO: 30 PG (ref 26.5–33)
MCHC RBC AUTO-ENTMCNC: 29 G/DL (ref 31.5–36.5)
MCV RBC AUTO: 104 FL (ref 78–100)
O2/TOTAL GAS SETTING VFR VENT: 2 %
PCO2 BLDV: 55 MM HG (ref 40–50)
PCO2 BLDV: 75 MM HG (ref 40–50)
PCO2 BLDV: 82 MM HG (ref 40–50)
PH BLDV: 7.4 [PH] (ref 7.32–7.43)
PH BLDV: 7.43 [PH] (ref 7.32–7.43)
PH BLDV: 7.58 [PH] (ref 7.32–7.43)
PHOSPHATE SERPL-MCNC: 2.3 MG/DL (ref 2.5–4.5)
PLATELET # BLD AUTO: 282 10E3/UL (ref 150–450)
PO2 BLDV: 23 MM HG (ref 25–47)
PO2 BLDV: 29 MM HG (ref 25–47)
PO2 BLDV: 53 MM HG (ref 25–47)
POTASSIUM SERPL-SCNC: 3.7 MMOL/L (ref 3.4–5.3)
POTASSIUM SERPL-SCNC: 3.8 MMOL/L (ref 3.4–5.3)
RBC # BLD AUTO: 2.6 10E6/UL (ref 3.8–5.2)
SODIUM SERPL-SCNC: 144 MMOL/L (ref 136–145)
SODIUM SERPL-SCNC: 145 MMOL/L (ref 136–145)
WBC # BLD AUTO: 9.3 10E3/UL (ref 4–11)

## 2022-09-11 PROCEDURE — 82803 BLOOD GASES ANY COMBINATION: CPT | Performed by: INTERNAL MEDICINE

## 2022-09-11 PROCEDURE — 250N000011 HC RX IP 250 OP 636: Performed by: INTERNAL MEDICINE

## 2022-09-11 PROCEDURE — 82310 ASSAY OF CALCIUM: CPT | Performed by: PEDIATRICS

## 2022-09-11 PROCEDURE — 214N000001 HC R&B CCU UMMC

## 2022-09-11 PROCEDURE — 99233 SBSQ HOSP IP/OBS HIGH 50: CPT | Mod: 24 | Performed by: INTERNAL MEDICINE

## 2022-09-11 PROCEDURE — 250N000012 HC RX MED GY IP 250 OP 636 PS 637

## 2022-09-11 PROCEDURE — 80051 ELECTROLYTE PANEL: CPT | Performed by: INTERNAL MEDICINE

## 2022-09-11 PROCEDURE — 250N000013 HC RX MED GY IP 250 OP 250 PS 637

## 2022-09-11 PROCEDURE — 85014 HEMATOCRIT: CPT | Performed by: INTERNAL MEDICINE

## 2022-09-11 PROCEDURE — 82803 BLOOD GASES ANY COMBINATION: CPT | Performed by: PEDIATRICS

## 2022-09-11 PROCEDURE — 250N000012 HC RX MED GY IP 250 OP 636 PS 637: Performed by: INTERNAL MEDICINE

## 2022-09-11 PROCEDURE — 36415 COLL VENOUS BLD VENIPUNCTURE: CPT | Performed by: INTERNAL MEDICINE

## 2022-09-11 PROCEDURE — 36415 COLL VENOUS BLD VENIPUNCTURE: CPT | Performed by: PEDIATRICS

## 2022-09-11 PROCEDURE — 99233 SBSQ HOSP IP/OBS HIGH 50: CPT | Performed by: PEDIATRICS

## 2022-09-11 PROCEDURE — 250N000013 HC RX MED GY IP 250 OP 250 PS 637: Performed by: PEDIATRICS

## 2022-09-11 RX ORDER — FLUDROCORTISONE ACETATE 0.1 MG/1
0.1 TABLET ORAL DAILY
Status: ON HOLD | COMMUNITY
End: 2022-10-08

## 2022-09-11 RX ORDER — ACETAZOLAMIDE 125 MG/1
125 TABLET ORAL
Status: DISCONTINUED | OUTPATIENT
Start: 2022-09-11 | End: 2022-09-12

## 2022-09-11 RX ORDER — SIMETHICONE 40MG/0.6ML
SUSPENSION, DROPS(FINAL DOSAGE FORM)(ML) ORAL
Status: ON HOLD | COMMUNITY
End: 2022-10-08

## 2022-09-11 RX ADMIN — NYSTATIN 1000000 UNITS: 100000 SUSPENSION ORAL at 08:11

## 2022-09-11 RX ADMIN — METOPROLOL TARTRATE 50 MG: 50 TABLET, FILM COATED ORAL at 20:25

## 2022-09-11 RX ADMIN — ACETAMINOPHEN 975 MG: 325 SOLUTION ORAL at 08:13

## 2022-09-11 RX ADMIN — INSULIN ASPART 2 UNITS: 100 INJECTION, SOLUTION INTRAVENOUS; SUBCUTANEOUS at 14:38

## 2022-09-11 RX ADMIN — Medication 40 MG: at 20:25

## 2022-09-11 RX ADMIN — INSULIN ASPART 1 UNITS: 100 INJECTION, SOLUTION INTRAVENOUS; SUBCUTANEOUS at 22:08

## 2022-09-11 RX ADMIN — MYCOPHENOLATE MOFETIL 750 MG: 200 POWDER, FOR SUSPENSION ORAL at 08:12

## 2022-09-11 RX ADMIN — INSULIN ASPART 1 UNITS: 100 INJECTION, SOLUTION INTRAVENOUS; SUBCUTANEOUS at 05:08

## 2022-09-11 RX ADMIN — PREDNISONE 10 MG: 10 TABLET ORAL at 08:13

## 2022-09-11 RX ADMIN — INSULIN ASPART 1 UNITS: 100 INJECTION, SOLUTION INTRAVENOUS; SUBCUTANEOUS at 18:07

## 2022-09-11 RX ADMIN — MYCOPHENOLATE MOFETIL 750 MG: 200 POWDER, FOR SUSPENSION ORAL at 20:25

## 2022-09-11 RX ADMIN — PREDNISONE 10 MG: 10 TABLET ORAL at 20:25

## 2022-09-11 RX ADMIN — METOPROLOL TARTRATE 50 MG: 50 TABLET, FILM COATED ORAL at 08:13

## 2022-09-11 RX ADMIN — INSULIN ASPART 1 UNITS: 100 INJECTION, SOLUTION INTRAVENOUS; SUBCUTANEOUS at 10:16

## 2022-09-11 RX ADMIN — NYSTATIN 1000000 UNITS: 100000 SUSPENSION ORAL at 17:56

## 2022-09-11 RX ADMIN — CALCIUM CARBONATE 600 MG (1,500 MG)-VITAMIN D3 400 UNIT TABLET 1 TABLET: at 18:06

## 2022-09-11 RX ADMIN — Medication 25 MG: at 22:08

## 2022-09-11 RX ADMIN — INSULIN ASPART 1 UNITS: 100 INJECTION, SOLUTION INTRAVENOUS; SUBCUTANEOUS at 02:40

## 2022-09-11 RX ADMIN — TACROLIMUS 3.5 MG: 5 CAPSULE ORAL at 08:12

## 2022-09-11 RX ADMIN — TACROLIMUS 3.5 MG: 5 CAPSULE ORAL at 17:55

## 2022-09-11 RX ADMIN — NYSTATIN 1000000 UNITS: 100000 SUSPENSION ORAL at 12:59

## 2022-09-11 RX ADMIN — ACETAMINOPHEN 975 MG: 325 SOLUTION ORAL at 20:25

## 2022-09-11 RX ADMIN — Medication 1 PACKET: at 08:14

## 2022-09-11 RX ADMIN — Medication 40 MG: at 08:12

## 2022-09-11 RX ADMIN — ACETAZOLAMIDE 125 MG: 125 TABLET ORAL at 14:24

## 2022-09-11 RX ADMIN — CALCIUM CARBONATE 600 MG (1,500 MG)-VITAMIN D3 400 UNIT TABLET 1 TABLET: at 08:12

## 2022-09-11 RX ADMIN — FUROSEMIDE 40 MG: 10 INJECTION, SOLUTION INTRAVENOUS at 08:12

## 2022-09-11 RX ADMIN — MULTIVITAMIN 15 ML: LIQUID ORAL at 12:59

## 2022-09-11 ASSESSMENT — ACTIVITIES OF DAILY LIVING (ADL)
ADLS_ACUITY_SCORE: 28

## 2022-09-11 NOTE — PLAN OF CARE
End of Shift Summary. See flowsheets for vital signs and detailed assessment.    Changes this shift: Watery stools. Novolog was started, but not up from pharmacy for 1800 dose yet. SBA to commode, dyspnea on exertion.    Plan: Bronch in a few days?      Goal Outcome Evaluation:    Plan of Care Reviewed With: patient     Overall Patient Progress: no change

## 2022-09-11 NOTE — PHARMACY-ADMISSION MEDICATION HISTORY
Admission Medication History Completed by Pharmacy    See [facility] Admission Navigator for allergy information, preferred outpatient pharmacy, prior to admission medications and immunization status.     Medication History Sources:     Dispense report    Patient report: talked to patient in person and her son over the phone    Changes made to PTA medication list (reason):    Added:   o Simethicone gas relif 20mg/0.3mL suspension  o Fludrocortisone 0.1 mg tab    Deleted: None    Changed:   o Insulin aspart: 1-12 U subcutaneous Q4h --> 1-3 U subcutaneous Q4h (patient taking differently)  o Loperamide: PO --> per J tube    Additional Information:    Insulins  o Insulin aspart:   - Patient uses 1 - 3 U subcutaneous every 4 hours.  - Patient is on sliding scale.   - Last dose on 9/9/22 morning.  o Insulin glargine:   - Patient uses 7 U subcutaneous BID.  - Not on sliding scale  - Last dose on 9/9/22 at 4pm    Prednisone: patient takes 2 tablets (10 mg) per J tube BID, and will taper to 10 mg in the morning & 7.5 mg in the afternoon on 9/15/22.     Fludrocortisone 0.1 mg tab: this medication is on patient's list, but neither the patient nor the son remembers her last dose.     Simethicone: patient's son was unsure about the sig of this medication. Patient does not take this very often.    Prior to Admission medications    Medication Sig Last Dose Taking? Auth Provider Long Term End Date   alcohol swab prep pads Use to swab area of injection/amos as directed. Unknown at -- Yes Susan Delacruz MD     blood glucose (NO BRAND SPECIFIED) lancets standard Use to test blood sugar 4 times daily or as directed. Unknown at -- Yes Susan Delacruz MD     fludrocortisone (FLORINEF) 0.1 MG tablet 0.1 mg by Per J Tube route daily Unknown at -- Yes Unknown, Entered By History No    insulin pen needle (31G X 5 MM) 31G X 5 MM miscellaneous Use one needle daily, as directed Unknown at -- Yes Susan Delacruz MD      pantoprazole (PROTONIX) 2 mg/mL SUSP suspension 20 mLs (40 mg) by Per J Tube route 2 times daily Unknown at -- Yes Claribel Franks MD     Sharps Container MISC 1 sharps container Unknown at -- Yes Susan Delacruz MD     simethicone (MYLICON) 40 MG/0.6ML suspension Unknown sig Past Month at -- Yes Unknown, Entered By History     acetaminophen (TYLENOL) 160 MG/5ML liquid Take 31.25 mLs (1,000 mg) by mouth 2 times daily 9/9/2022 at am  Kaylin Triana PA-C     aspirin (ASA) 81 MG EC tablet Take 1 tablet (81 mg) by mouth daily 9/9/2022 at am  Claribel Franks MD     blood glucose (CONTOUR NEXT TEST) test strip Use to test blood sugar 4 times daily, as directed. 9/9/2022 at --  Susan Delacruz MD     blood glucose monitoring (CONTOUR NEXT MONITOR W/DEVICE KIT) meter device kit Use to test blood sugar 4 times daily or as directed. 9/9/2022 at --  Susan Delacruz MD     calcium carbonate 600 mg-vitamin D 400 units (CALTRATE) 600-400 MG-UNIT per tablet 1 tablet by Per J Tube route 2 times daily (with meals) 9/9/2022 at am  Claribel Franks MD     furosemide (LASIX) 20 MG tablet Take 1 tablet (20 mg) by mouth daily 9/9/2022 at am  Kaylin Triana PA-C Yes    insulin aspart (NOVOLOG PEN) 100 UNIT/ML pen Inject 1-12 Units Subcutaneous every 4 hours  Patient taking differently: Inject 1-3 Units Subcutaneous every 4 hours 9/9/2022 at am  Susan Delacruz MD Yes    insulin glargine (LANTUS PEN) 100 UNIT/ML pen Inject 7 Units Subcutaneous 2 times daily 9/9/2022 at 1600  Susan Delacruz MD Yes    loperamide (IMODIUM A-D) 2 MG tablet Take 1 tablet (2 mg) by mouth 4 times daily as needed for diarrhea  Patient taking differently: 2 mg by Per J Tube route 4 times daily as needed for diarrhea 9/9/2022 at am  Claribel Franks MD     metoprolol tartrate (LOPRESSOR) 50 MG tablet 1 tablet (50 mg) by Per Feeding Tube route 2 times daily 9/9/2022 at am  Claribel Franks MD Yes     Multiple Vitamins-Minerals (MULTIVITAMINS W/MINERALS) liquid 15 mLs by Per J Tube route daily 9/9/2022 at am  Claribel Franks MD     mycophenolate (GENERIC EQUIVALENT) 200 MG/ML suspension 3.75 mLs (750 mg) by Per J Tube route 2 times daily 9/9/2022 at 2000  Claribel Franks MD Yes    Nutritional Supplements (GLUCOSE MANAGEMENT) TABS Take 3-4 tablets by mouth as needed (hypoglycemia) Pharmacist may change instructions to fit whatever glucose tab product they have in stock.  Patient not taking: Reported on 9/11/2022 Not Taking at Unknown time  Kaylin Triana PA-C     nystatin (MYCOSTATIN) 155403 UNIT/ML suspension Swish and swallow 10 mLs (1,000,000 Units) in mouth 4 times daily 9/9/2022 at am  Sara Grayson NP     ondansetron (ZOFRAN ODT) 4 MG ODT tab Take 1 tablet (4 mg) by mouth every 6 hours as needed for nausea or vomiting 9/9/2022 at am  Salvador Matthew MD     oxyCODONE (ROXICODONE) 5 MG tablet 1 tablet (5 mg) by Per J Tube route every 8 hours as needed for breakthrough pain 9/9/2022 at am  Kaylin Triana PA-C     predniSONE (DELTASONE) 5 MG tablet 2 tablets (10 mg) by Per J Tube route 2 times daily Start the evening of 8/17; Taper to 10 mg twice a day starting 8/18 with next taper due 9/15. 9/9/2022 at pm  Susan Miller MD Yes    protein modular (PROSOURCE TF) LIQD 1 packet by Per Feeding Tube route daily 9/9/2022 at 1600  Claribel Franks MD     tacrolimus (GENERIC EQUIVALENT) 1 mg/mL suspension 3.5 mLs (3.5 mg) by Per J Tube route 2 times daily 9/9/2022 at pm  Claribel Franks MD Yes    valGANciclovir (VALCYTE) 50 MG/ML solution 9 mLs (450 mg) by Oral or Feeding Tube route three times a week  Patient taking differently: 450 mg by Oral or Feeding Tube route three times a week Take on Mon/Wed/Fri 9/9/2022 at am  Clarible Franks MD Yes    albuterol (PROAIR HFA/PROVENTIL HFA/VENTOLIN HFA) 108 (90 BASE) MCG/ACT Inhaler Inhale 2 puffs into the lungs every 6 hours as  needed for shortness of breath / dyspnea or wheezing   Reported, Patient Yes 8/15/22   ipratropium - albuterol 0.5 mg/2.5 mg/3 mL (DUONEB) 0.5-2.5 (3) MG/3ML neb solution Inhale 1 vial into the lungs every 4 hours as needed for shortness of breath / dyspnea   Unknown, Entered By History Yes 8/15/22       Date completed: 09/11/22    Medication history completed by: Petrona Enriquez, Pharmacy Intern

## 2022-09-11 NOTE — PLAN OF CARE
SHIFT 2300pm - 0700am    Neuro: A&Ox4. Use of call light appropriately and able to make needs known.  Cardiac: Blood pressure 135/77, pulse 89, temperature 98.5  F (36.9  C), temperature source Oral, resp. rate 20, weight 73 kg (160 lb 14.4 oz), SpO2 100 %, not currently breastfeeding.  Respiratory: Sating > 93 on 2L NC.  GI/: Adequate urine output, use of bedside commode, BM X 1 this shift, watery stool  Diet/appetite: Tube feed @ 35 m/hr and 30 ml Q4 flush.  Activity:  Assist X 1.  Pain: Patient denied pain this shift   Skin: No new deficits noted.  LDA's: PIV X 1 SL    Plan: Continue with POC. Notify primary team with changes.

## 2022-09-11 NOTE — PLAN OF CARE
D: Planned admission 9/9 from transplant pulmonology to work up antibody medicated rejection versus infection. Hx of end stage COPD s/p bilateral sequential lung transplant (6/22) on triple IS ( MMF/Tacro/pred), pyloric ulcer, recent ERCP c/f hemobilia, gastroparesis s/p GJ tube placement and Percutaneous J tube.     I: Monitored vitals and assessed pt status.   Changed: Lasix 40 mg IV added.  Tele: Sinus rhythm.  O2: 2 LPM NC.  Mobility: SBA.    A: A0x4. VSS. Afebrile. Urinating adequately. Having loose watery stools. No c/o pain or SOB.     P: Continue to monitor Pt status and report changes to gold 10.    Temp:  [98.5  F (36.9  C)-98.7  F (37.1  C)] 98.7  F (37.1  C)  Pulse:  [66-96] 96  Resp:  [17-20] 20  BP: (126-133)/(62-76) 132/62  Cuff Mean (mmHg):  [92-93] 92  SpO2:  [95 %-99 %] 98 %      Goal Outcome Evaluation:  viv Roman when euvolemic.   Plan of Care Reviewed With: patient

## 2022-09-11 NOTE — PLAN OF CARE
Goal Outcome Evaluation:    Plan of Care Reviewed With: patient     Overall Patient Progress: no change    Outcome Evaluation: Pt to resume TF as at home with FWF to meet FT patent and as needed w/ Rx

## 2022-09-11 NOTE — PLAN OF CARE
Neuro: A&Ox4.   Cardiac: Afebrile, VSS. SR.     Respiratory: 1.5L at rest, increased to 3L when OOB to commode. ANAYA, tachypneic breathing. Several VBGs today. MD notes mention may trial Bipap overnight, no Bipap orders in place.   GI/: Continent of bowel and bladder. Multiple, frequent mixed loose stools & urine. Rectal area reddened.   Diet/appetite: NPO, TF via J tube @ 35 mL/hr, Gtube vented to gravity bag, scant output.  All meds given through J tube, which patient was doing at [facility].   Activity: Up with SBA to bedside commode  Pain: Denies except for rectal area discomfort from freq stooling    Skin: Bruising from insulin shots. Healing surgical incision. Rectum reddened from frequent BMs, barrier cream applied. +2 BLE edema   Lines: PIV SL   Drains: Gtube to gravity, J tube cont TF    Plan: Diurese. Possible bronch early to mid week.

## 2022-09-11 NOTE — PROGRESS NOTES
[facility]    Medicine Progress Note - Hospitalist Service, GOLD TEAM 10    Date of Admission:  9/9/2022    Assessment & Plan        Sofie Rodriguez is a 62 year old female with pertinent hx of end stage COPD s/p bilateral sequential lung transplant (6/22) on triple IS ( MMF/Tacro/pred), pyloric ulcer, recent ERCP c/f hemobilia, gastroparesis s/p GJ tube placement and Percutaneous J tube presents for a planned admission from transplant pulmonology to work up antibody medicated rejection versus infection.     Today:  Likely fluid overload contributing to acute respiratory failure.  Needs diuresis before bronch can be done.  Still no evidence of significant sepsis.  New metabolic alkalosis today possibly due to contraction alkalosis.     Today's Changes:  1. Continue diuresis   Furosemide 40 IV daily   Monitor electrolytes closely   Strict I/s/Os  2. Acetazolamide for alkalosis   Monitor VBG Q12 hrs  3. Low threshold for initiating empiric antibiotics  4. Consider Bipap at night for CO2 retention  5. 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   Recent imaging - 9/8/22 CT chest - R>L pleural effusions with bilateral hazy                              nodular opacities and paraseptal thickening.   PFT - FEV1 40%, FVC - 33% FEV1/FVC 79%  Immune suppression - Tacrolimus 3.5 mg BID Goal level 8-12,  mg                                         BID ( decreased dose in the setting of GI symptoms),                                        prednisone taper currently on 10 mg BID until 9/15/22.  CMV ppx - Valgancyclovir 450 mg TID ( ending 9/28)   Oral candida ppx - Nystatin   Diuresis -  PTA Furosemide 20mg once  Pain - PTA PRN oxycodone ordered   Spoke to  pulmonology fellow about patient.     - Formal tx pulmonology consult placed.   - Consider CAP coverage in signs of sepsis appear     # >3 X Loose stool, abd pain c/f C diff infection vs MMF side effect   # hx of C diff   Patient is on MMF with chronic loose stools at baseline. Notes reflect recent dose reduction of MMF for this. Patient reports acute worsening of C diff in the past 2-3 days clear stools and abdominal pain.   DDx - C diff (negtaive on 9/10) versus MMF side effect vs wide infectious bacterial and viral etiology. Also considered cathartic effect of blood in the small bowel lumen.     # Pyloric ulcer EGD 8/3/2022  # Acute on chronic macrocytic anemia    hx of coffee ground emesis. Patient had a EGD 8/3 which showed a Clear base ulcer in the pyloric channel identified as the culprit lesion. At the time was also noted to have excess gastric accumulation 2/2 pyloric inflammation. Vent G tube placed to allow for drainage. Vented to gravity now.  repeat EGD planned in October to check healing.  Patient's hgb hit loco 6.3 9/1 and since then in the 7s-8s. 7.1 this admission.   - Pantoprazole 40 mg BID     - patient consented for blood transfusion, type and screen ordered.   - if AM hgb acute drop consider luminal GI consult     # extra hepatic and intrahepatic biliary dilation c/f hemobilia   # Intra ductal papillary mucinous neoplasm   ERCP 8/11 showed hemobilia.   - Monitor LFTs     #  CKD G3   Patient has been variable GFRs 30-50s in the last few months. Most recent Cr trend 1.5-1.9 1.56 9/8/22.   - CTM     # Protein-calorie malnutrition  # severe gastroparesis s/p  GJ tube placement 7/27/2022  - RD nutrition consult placed for TF  - Percutaneous j tube in place with G venting to gravity     #HTN  # HFpEF   # A flutter with RVR/SVT   Echo 8/15/22 - EF 60-65% with trace mitral insufficiency. Has recently completed zio patch. Admission pro BNP elevated to 30K.   - Continue PTA metoprolol 50 mg BID         "  Diet: Adult Formula Drip Feeding: Continuous Novasource Renal; Jejunostomy; Goal Rate: 35 ml/hr--okay to resume at goal; mL/hr; Medication - Feeding Tube Flush Frequency: At least 15-30 mL water before and after medication administration and with tube c...    DVT Prophylaxis: Pneumatic Compression Devices  Dong Catheter: Not present  Central Lines: None  Cardiac Monitoring: None  Code Status: Full Code      Disposition Plan     Expected Discharge Date: 09/14/2022        Discharge Comments: bronch tuesday or wednesday        The patient's care was [facility] with the Bedside Nurse, [facility].    Nathen Diggs MD  Hospitalist Service, [address]  Securely message with the Vocera Web Console (learn more here)  Text page via [employer] Paging/Directory   Please see signed in provider for up to date coverage information      Clinically Significant Risk Factors Present on Admission                # Overweight: Estimated body mass index is 28.5 kg/m  as calculated from the following:    Height as of 9/1/22: 1.6 m (5' 3\").    Weight as of this encounter: 73 kg (160 lb 14.4 oz).        ______________________________________________________________________    Interval History   Slept ok overnight.  Breathing a better today.  Oxygen need stable (around 1.5-2L).  Feeling hungry.    Data reviewed today: I reviewed all medications, new labs and imaging results over the last 24 hours.    Physical Exam   Vital Signs: Temp: 98.6  F (37  C) Temp src: Oral BP: 129/62 Pulse: 85   Resp: 22 SpO2: 92 % O2 Device: Nasal cannula Oxygen Delivery: 1.5 LPM  Weight: 160 lbs 14.4 oz     Constitutional: awake, alert, cooperative, mild respiratory distress (improved from yesterday)  Respiratory: Some increased work of breathing, good air exchange, diffuse crackles only faint now.  No wheezes.  Cardiovascular: Regular rate and rhythm, normal S1 and S2, and 2+ " systolic murmur   GI: Normal bowel sounds, soft, non-distended, non-tender  Neuropsychiatric: General: calm and normal eye contact  Affect: normal and pleasant    Data   No results found for this or any previous visit (from the past 24 hour(s)).

## 2022-09-11 NOTE — PROGRESS NOTES
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 difficile colitis, gastroparesis requiring GJ tube placement (7/27/2022) GI bleed secondary to pyloric ulcer, hemobilia status post ERCP and MRCP (August 2022), persistent bilateral pleural collections (possibly related to size mismatch), persistent vasoplegia, persistent CO2 retention and right hemidiaphragm palsy. Pretransplant history significant for hypertension, HFpEF, NTM, hepatitis C and previous methamphetamine use.  Patient presents now with 10 days of mild intermittent dyspnea at rest, increased with any activity, daily hemoptysis in the early a.m. and increased lower extremity edema.  Progressive decline in PFTs since mid August. FEV1 (1.22 -> 0.98) and FVC (1.33 -> 1.01) on 8/18. Chest CT from 9/8 compared to 7/28.  Persistent bilateral pleural effusions, right greater than left, present at the apices, in the fissure on the right and loculated appearing collections at both bases, increased from previous, diffuse bilateral groundglass changes more prominent in the lower lung fields in the apices.  More focal appearing infiltrates in the left lower lobe.      Recommendations:  - BMP, CBC and VBG daily (ordered)  - CXR tomorrow (ordered)  - Continue diuresis  - Tacrolimus level tomorrow (ordered)  - Continue current immunosuppression  - Next prednisone taper 9/15 (not ordered)  - Possible bronchoscopy mid-week depending on clinical course.       Pulmonary/lung transplant: The patient presents with increased shortness of breath, dyspnea with minimal exertion and small volume daily hemoptysis.  Marked decline in PFTs.  Chest CT with increased effusions and  groundglass changes. DSA notable for new DQB2 starting 8/10 and gradually increasing from 2155 to 7033 with a small decrease to 5744 on 9/7.  Differential diagnosis for the patient's presentation includes volume overload, acute cellular rejection, antibody mediated rejection (MAR), alveolar hemorrhage or infection.  Volume overload is supported by the CT appearance, the increased lower extremity edema and the markedly elevated BNP.  Symptoms and CT appearance could be consistent with acute cellular rejection which is not uncommon at this point following transplantation.  AMR is supported by the new and rising DSA temporally correlating with new symptoms.  The patient's chest CT could be consistent with alveolar hemorrhage, further supported by the patient's intermittent small-volume hemoptysis.  This is occasionally (rarely) seen as an acute rejection variant.  With the current immunosuppression, infection needs to be considered although is less consistent with patient presentation.  It is likely this is multifactorial with 2 or more of the above-noted etiologies occurring simultaneously.    - Continue diuresis, aiming for 500-1500 mL out greater than in each day.    - Close laboratory monitoring.    - When volume status is optimized, will pursue bronchoscopy in an effort to differentiate among the other potential etiologies.  Ideally biopsies will be pursued but clinical status may not permit biopsies to be performed safely. Transbronchial Biopsies probably precluded by markedly elevated BUN per lung transplant guidelines.   Holding aspirin for possible biopsies.  Serial lavages should be performed in an effort to assess for diffuse alveolar hemorrhage.   -  Empiric treatment for AMR and or ACR will depend on the clinical course.  - Provide supplemental oxygen as needed to maintain saturation greater than 92%.    Acid/Base status: VBG from10:24 this AM (9/11) with pH 7.58 does not seem plausible.  Repeat VBG at  12:30 (7.40/82/23/50) is more consistent with recent results.  Unlclear if elevated pCO2 is due to worsened resp status or is compensation for elevated bicarb.  May need ABG to clarify.   - Follow daily VBG  - Consider ABG depending on clinical course  - Consider a trial of acetazolamide if progressive rise in bicarb     Lung transplantation/immunosuppression: Continue current tacrolimus, MMF and prednisone.    - Tacrolimus should be adjusted to maintain a level of 8-12. Check a level tomorrow (9/12) morning (ordered)  -  Continue CellCept at 750 mg twice daily.    - Continue current prednisone with next taper due on 9/15.    - All immunosuppression should be administered through the jejunal tube.        Date AM dose (mg) PM dose (mg)   8/18/22 10 10   9/15/22 10 7.5   10/13/22 7.5 7.5   11/10/22 7.5 5   12/8/22 5 5   1/5/23 5 2.5            Prophylaxis: Bactrim was stopped due to hyperkalemia, dapsone stopped due to anemia, pentamadine was administered on 9/7 but complicated by severe cough. Long-term PJP prophylaxis will be determined based on clinical course. If potassium remains low, could retrial bactrim.  -Valganciclovir should continue daily 9/28 although may need to be extended if immunosuppression is intensified.    - Continue nystatin.    - Low-level EBV viremia, continue monitoring monthly.     Diarrhea: Etiology is unclear. This may be related to CellCept but unable to switch to Myfortic as the patient does not tolerate oral intake and reluctant to switch to azathioprine until etiology of dyspnea is clarified.  - C. difficile PCR (-)  - Consider enteric pathogen panel and O&P.     Hypogammaglobulinemia: Previously received IgG replacement on 7/30.  Repeat level on 8/29 was adequate so no replacement was required.  Continue checking monthly.     Acute kidney injury: Patient required a therapeutic dose of tacrolimus.  Continue efforts at diuresis  Avoid other nephrotoxins.     History of GI bleed: Continue  twice daily pantoprazole.     Nutrition: Continue current tube feeding support.        Ajay Castillo MD  [phone]             Interval History:   No events overnight.  Slept poorly  Dyspnea at rest: None  Dyspnea on exertion: with minimal exertion, without change  Cough:  occasional  Sputum:  minimal  Hemoptysis:  Small amt overnight, decreased from previous  Chest Pain: None            Review of Systems:   C: NEGATIVE for fever, chills  INTEGUMENTARY/SKIN: no rash or obvious new lesions  ENT/MOUTH: no sore throat, new sinus pain or nasal drainage  RESP: see interval history  CV: NEGATIVE for chest pain, palpitations. No change in peripheral edema  GI: Nauseated after meds.  No vomiting. Persistent loose stool  : no dysuria  MUSCULOSKELETAL: no myalgias, arthralgias          Medications:       acetaminophen  975 mg Per Feeding Tube BID     calcium carbonate 600 mg-vitamin D 400 units  1 tablet Per J Tube BID w/meals     docusate sodium  100 mg Oral BID     furosemide  40 mg Intravenous Daily     insulin aspart  1-6 Units Subcutaneous Q4H     metoprolol tartrate  50 mg Per Feeding Tube BID     multivitamins w/minerals  15 mL Per J Tube Daily     mycophenolate  750 mg Per J Tube BID     nystatin  1,000,000 Units Swish & Swallow 4x Daily     pantoprazole  40 mg Per J Tube BID     predniSONE  10 mg Per J Tube BID     protein modular  1 packet Per J Tube Daily     sodium chloride (PF)  3 mL Intracatheter Q8H     tacrolimus  3.5 mg Per J Tube BID IS     traZODone  25 mg Oral At Bedtime    Or     traZODone  50 mg Oral At Bedtime     [START ON 9/12/2022] valGANciclovir  450 mg Oral or Feeding Tube Once per day on Mon Wed Fri     dextrose, glucose **OR** dextrose **OR** glucagon, lidocaine 4%, lidocaine (buffered or not buffered), melatonin, naloxone **OR** naloxone **OR** naloxone **OR** naloxone, ondansetron **OR** ondansetron, oxyCODONE, sodium chloride (PF)         Physical Exam:   Temp:  [98.3  F (36.8  C)-98.7  F  (37.1  C)] 98.5  F (36.9  C)  Pulse:  [66-96] 89  Resp:  [20] 20  BP: (126-135)/(62-77) 135/77  Cuff Mean (mmHg):  [92] 92  SpO2:  [96 %-100 %] 100 %    Intake/Output Summary (Last 24 hours) at 9/11/2022 0800  Last data filed at 9/11/2022 0344  Gross per 24 hour   Intake 668 ml   Output 2400 ml   Net -1732 ml     Constitutional:   Awake, alert and in no apparent distress     Eyes:   nonicteric     ENT:    oral mucosa moist without lesions     Neck:   Supple without supraclavicular or cervical lymphadenopathy     Lungs:   Mildly diminished air flow.  No crackles. No rhonchi.  No wheezes.     Cardiovascular:   Normal S1 and S2.  RRR.  II/VI sys murmur. No gallop. No rub.     Abdomen:   NABS, soft, nondistended, nontender.  No HSM.     Musculoskeletal:   2+ edema     Neurologic:   Alert and conversant.     Skin:   Warm, dry.  No rash on limited exam.             Data:   All laboratory and imaging data reviewed.    Results for orders placed or performed during the hospital encounter of 09/09/22 (from the past 24 hour(s))   Glucose by meter   Result Value Ref Range    GLUCOSE BY METER POCT 163 (H) 70 - 99 mg/dL   Glucose by meter   Result Value Ref Range    GLUCOSE BY METER POCT 160 (H) 70 - 99 mg/dL   Glucose by meter   Result Value Ref Range    GLUCOSE BY METER POCT 159 (H) 70 - 99 mg/dL   Glucose by meter   Result Value Ref Range    GLUCOSE BY METER POCT 171 (H) 70 - 99 mg/dL   Glucose by meter   Result Value Ref Range    GLUCOSE BY METER POCT 179 (H) 70 - 99 mg/dL   Glucose by meter   Result Value Ref Range    GLUCOSE BY METER POCT 185 (H) 70 - 99 mg/dL

## 2022-09-12 ENCOUNTER — TELEPHONE (OUTPATIENT)
Dept: TRANSPLANT | Facility: CLINIC | Age: 62
End: 2022-09-12

## 2022-09-12 ENCOUNTER — APPOINTMENT (OUTPATIENT)
Dept: GENERAL RADIOLOGY | Facility: CLINIC | Age: 62
DRG: 205 | End: 2022-09-12
Attending: NURSE PRACTITIONER
Payer: MEDICARE

## 2022-09-12 ENCOUNTER — HOME INFUSION (PRE-WILLOW HOME INFUSION) (OUTPATIENT)
Dept: PHARMACY | Facility: CLINIC | Age: 62
End: 2022-09-12

## 2022-09-12 PROBLEM — J81.1 PULMONARY EDEMA: Status: ACTIVE | Noted: 2022-09-09

## 2022-09-12 PROBLEM — R04.2 HEMOPTYSIS: Status: ACTIVE | Noted: 2022-09-09

## 2022-09-12 LAB
ANION GAP SERPL CALCULATED.3IONS-SCNC: 3 MMOL/L (ref 7–15)
BASE EXCESS BLDV CALC-SCNC: 15.5 MMOL/L (ref -7.7–1.9)
BUN SERPL-MCNC: 74.4 MG/DL (ref 8–23)
CALCIUM SERPL-MCNC: 9.2 MG/DL (ref 8.8–10.2)
CHLORIDE SERPL-SCNC: 95 MMOL/L (ref 98–107)
CREAT SERPL-MCNC: 1.42 MG/DL (ref 0.51–0.95)
DEPRECATED HCO3 PLAS-SCNC: 46 MMOL/L (ref 22–29)
ERYTHROCYTE [DISTWIDTH] IN BLOOD BY AUTOMATED COUNT: 19.6 % (ref 10–15)
GFR SERPL CREATININE-BSD FRML MDRD: 42 ML/MIN/1.73M2
GLUCOSE BLDC GLUCOMTR-MCNC: 145 MG/DL (ref 70–99)
GLUCOSE BLDC GLUCOMTR-MCNC: 148 MG/DL (ref 70–99)
GLUCOSE BLDC GLUCOMTR-MCNC: 176 MG/DL (ref 70–99)
GLUCOSE BLDC GLUCOMTR-MCNC: 200 MG/DL (ref 70–99)
GLUCOSE BLDC GLUCOMTR-MCNC: 212 MG/DL (ref 70–99)
GLUCOSE BLDC GLUCOMTR-MCNC: 213 MG/DL (ref 70–99)
GLUCOSE SERPL-MCNC: 162 MG/DL (ref 70–99)
HCO3 BLDV-SCNC: 46 MMOL/L (ref 21–28)
HCT VFR BLD AUTO: 26.8 % (ref 35–47)
HGB BLD-MCNC: 7.6 G/DL (ref 11.7–15.7)
MCH RBC QN AUTO: 30.2 PG (ref 26.5–33)
MCHC RBC AUTO-ENTMCNC: 28.4 G/DL (ref 31.5–36.5)
MCV RBC AUTO: 106 FL (ref 78–100)
O2/TOTAL GAS SETTING VFR VENT: 2 %
PCO2 BLDV: 81 MM HG (ref 40–50)
PH BLDV: 7.36 [PH] (ref 7.32–7.43)
PLATELET # BLD AUTO: 276 10E3/UL (ref 150–450)
PO2 BLDV: 45 MM HG (ref 25–47)
POTASSIUM SERPL-SCNC: 3.6 MMOL/L (ref 3.4–5.3)
RBC # BLD AUTO: 2.52 10E6/UL (ref 3.8–5.2)
SODIUM SERPL-SCNC: 144 MMOL/L (ref 136–145)
WBC # BLD AUTO: 8 10E3/UL (ref 4–11)

## 2022-09-12 PROCEDURE — 250N000012 HC RX MED GY IP 250 OP 636 PS 637: Performed by: INTERNAL MEDICINE

## 2022-09-12 PROCEDURE — 71045 X-RAY EXAM CHEST 1 VIEW: CPT

## 2022-09-12 PROCEDURE — 80048 BASIC METABOLIC PNL TOTAL CA: CPT | Performed by: INTERNAL MEDICINE

## 2022-09-12 PROCEDURE — 250N000011 HC RX IP 250 OP 636: Performed by: INTERNAL MEDICINE

## 2022-09-12 PROCEDURE — 250N000012 HC RX MED GY IP 250 OP 636 PS 637

## 2022-09-12 PROCEDURE — 99207 PR NON-BILLABLE SERV PER CHARTING: CPT | Performed by: NURSE PRACTITIONER

## 2022-09-12 PROCEDURE — 36415 COLL VENOUS BLD VENIPUNCTURE: CPT | Performed by: INTERNAL MEDICINE

## 2022-09-12 PROCEDURE — 250N000013 HC RX MED GY IP 250 OP 250 PS 637

## 2022-09-12 PROCEDURE — 99233 SBSQ HOSP IP/OBS HIGH 50: CPT | Performed by: PEDIATRICS

## 2022-09-12 PROCEDURE — 250N000013 HC RX MED GY IP 250 OP 250 PS 637: Performed by: PEDIATRICS

## 2022-09-12 PROCEDURE — 82803 BLOOD GASES ANY COMBINATION: CPT | Performed by: INTERNAL MEDICINE

## 2022-09-12 PROCEDURE — 250N000013 HC RX MED GY IP 250 OP 250 PS 637: Performed by: NURSE PRACTITIONER

## 2022-09-12 PROCEDURE — 71045 X-RAY EXAM CHEST 1 VIEW: CPT | Mod: 26 | Performed by: RADIOLOGY

## 2022-09-12 PROCEDURE — 250N000013 HC RX MED GY IP 250 OP 250 PS 637: Performed by: INTERNAL MEDICINE

## 2022-09-12 PROCEDURE — 214N000001 HC R&B CCU UMMC

## 2022-09-12 PROCEDURE — 99233 SBSQ HOSP IP/OBS HIGH 50: CPT | Mod: FS | Performed by: NURSE PRACTITIONER

## 2022-09-12 PROCEDURE — 85027 COMPLETE CBC AUTOMATED: CPT | Performed by: INTERNAL MEDICINE

## 2022-09-12 RX ORDER — LOPERAMIDE HCL 1 MG/7.5ML
2 SUSPENSION ORAL 3 TIMES DAILY
Status: DISCONTINUED | OUTPATIENT
Start: 2022-09-12 | End: 2022-09-12

## 2022-09-12 RX ORDER — METOPROLOL TARTRATE 50 MG
50 TABLET ORAL 2 TIMES DAILY
Status: DISCONTINUED | OUTPATIENT
Start: 2022-09-12 | End: 2022-09-19

## 2022-09-12 RX ORDER — DOCUSATE SODIUM 100 MG/1
100 CAPSULE, LIQUID FILLED ORAL 2 TIMES DAILY
Status: DISCONTINUED | OUTPATIENT
Start: 2022-09-12 | End: 2022-09-12

## 2022-09-12 RX ORDER — TRAZODONE HYDROCHLORIDE 50 MG/1
50 TABLET, FILM COATED ORAL AT BEDTIME
Status: DISCONTINUED | OUTPATIENT
Start: 2022-09-12 | End: 2022-09-22

## 2022-09-12 RX ORDER — ACETAZOLAMIDE 125 MG/1
125 TABLET ORAL
Status: DISCONTINUED | OUTPATIENT
Start: 2022-09-12 | End: 2022-09-20

## 2022-09-12 RX ORDER — LOPERAMIDE HCL 2 MG
2 CAPSULE ORAL 3 TIMES DAILY
Status: DISCONTINUED | OUTPATIENT
Start: 2022-09-12 | End: 2022-09-14

## 2022-09-12 RX ORDER — ACETAMINOPHEN 325 MG/1
975 TABLET ORAL 3 TIMES DAILY
Status: DISCONTINUED | OUTPATIENT
Start: 2022-09-12 | End: 2022-09-23

## 2022-09-12 RX ORDER — GUAR GUM
1 PACKET (EA) ORAL DAILY
Status: CANCELLED | OUTPATIENT
Start: 2022-09-12 | End: 2022-09-15

## 2022-09-12 RX ADMIN — CALCIUM CARBONATE 600 MG (1,500 MG)-VITAMIN D3 400 UNIT TABLET 1 TABLET: at 18:06

## 2022-09-12 RX ADMIN — INSULIN ASPART 1 UNITS: 100 INJECTION, SOLUTION INTRAVENOUS; SUBCUTANEOUS at 23:55

## 2022-09-12 RX ADMIN — NYSTATIN 1000000 UNITS: 100000 SUSPENSION ORAL at 08:33

## 2022-09-12 RX ADMIN — VALGANCICLOVIR HYDROCHLORIDE 450 MG: 50 POWDER, FOR SOLUTION ORAL at 08:34

## 2022-09-12 RX ADMIN — Medication 25 MG: at 20:01

## 2022-09-12 RX ADMIN — ACETAMINOPHEN 975 MG: 325 TABLET, FILM COATED ORAL at 13:25

## 2022-09-12 RX ADMIN — TACROLIMUS 3.5 MG: 5 CAPSULE ORAL at 08:35

## 2022-09-12 RX ADMIN — INSULIN ASPART 2 UNITS: 100 INJECTION, SOLUTION INTRAVENOUS; SUBCUTANEOUS at 13:19

## 2022-09-12 RX ADMIN — ACETAMINOPHEN 975 MG: 325 TABLET, FILM COATED ORAL at 20:01

## 2022-09-12 RX ADMIN — MYCOPHENOLATE MOFETIL 750 MG: 200 POWDER, FOR SUSPENSION ORAL at 20:02

## 2022-09-12 RX ADMIN — METOPROLOL TARTRATE 50 MG: 50 TABLET, FILM COATED ORAL at 20:01

## 2022-09-12 RX ADMIN — OXYCODONE HYDROCHLORIDE 5 MG: 5 SOLUTION ORAL at 21:11

## 2022-09-12 RX ADMIN — MYCOPHENOLATE MOFETIL 750 MG: 200 POWDER, FOR SUSPENSION ORAL at 08:36

## 2022-09-12 RX ADMIN — PREDNISONE 10 MG: 10 TABLET ORAL at 08:33

## 2022-09-12 RX ADMIN — METOPROLOL TARTRATE 50 MG: 50 TABLET, FILM COATED ORAL at 08:33

## 2022-09-12 RX ADMIN — Medication 40 MG: at 20:02

## 2022-09-12 RX ADMIN — CALCIUM CARBONATE 600 MG (1,500 MG)-VITAMIN D3 400 UNIT TABLET 1 TABLET: at 08:33

## 2022-09-12 RX ADMIN — NYSTATIN 1000000 UNITS: 100000 SUSPENSION ORAL at 20:02

## 2022-09-12 RX ADMIN — FUROSEMIDE 40 MG: 10 INJECTION, SOLUTION INTRAVENOUS at 08:35

## 2022-09-12 RX ADMIN — INSULIN ASPART 1 UNITS: 100 INJECTION, SOLUTION INTRAVENOUS; SUBCUTANEOUS at 09:24

## 2022-09-12 RX ADMIN — INSULIN ASPART 1 UNITS: 100 INJECTION, SOLUTION INTRAVENOUS; SUBCUTANEOUS at 18:07

## 2022-09-12 RX ADMIN — ACETAZOLAMIDE 125 MG: 125 TABLET ORAL at 08:33

## 2022-09-12 RX ADMIN — TACROLIMUS 3.5 MG: 5 CAPSULE ORAL at 18:06

## 2022-09-12 RX ADMIN — INSULIN ASPART 2 UNITS: 100 INJECTION, SOLUTION INTRAVENOUS; SUBCUTANEOUS at 04:25

## 2022-09-12 RX ADMIN — OXYCODONE HYDROCHLORIDE 5 MG: 5 SOLUTION ORAL at 10:07

## 2022-09-12 RX ADMIN — MULTIVITAMIN 15 ML: LIQUID ORAL at 12:02

## 2022-09-12 RX ADMIN — NYSTATIN 1000000 UNITS: 100000 SUSPENSION ORAL at 16:22

## 2022-09-12 RX ADMIN — PREDNISONE 10 MG: 10 TABLET ORAL at 20:01

## 2022-09-12 RX ADMIN — ACETAMINOPHEN 975 MG: 325 SOLUTION ORAL at 08:33

## 2022-09-12 RX ADMIN — LOPERAMIDE HYDROCHLORIDE 2 MG: 2 CAPSULE ORAL at 20:01

## 2022-09-12 RX ADMIN — INSULIN ASPART 1 UNITS: 100 INJECTION, SOLUTION INTRAVENOUS; SUBCUTANEOUS at 21:11

## 2022-09-12 RX ADMIN — LOPERAMIDE HYDROCHLORIDE 2 MG: 2 CAPSULE ORAL at 12:02

## 2022-09-12 RX ADMIN — Medication 40 MG: at 08:34

## 2022-09-12 RX ADMIN — NYSTATIN 1000000 UNITS: 100000 SUSPENSION ORAL at 12:02

## 2022-09-12 RX ADMIN — ACETAZOLAMIDE 125 MG: 125 TABLET ORAL at 16:22

## 2022-09-12 RX ADMIN — Medication 1 PACKET: at 08:34

## 2022-09-12 ASSESSMENT — ACTIVITIES OF DAILY LIVING (ADL)
ADLS_ACUITY_SCORE: 28

## 2022-09-12 NOTE — PLAN OF CARE
End of shift summary(8725-7089)  Neuro: A&Ox4.   Cardiac: SR.     Respiratory: 1.5L . ANAYA, tachypneic breathing.   GI/: Continent of bowel and bladder.   3x mixed loose stools & urine. Rectal area reddened. ( C.Diff negative)  Diet/appetite: NPO, TF via J tube @ 35 mL/hr, Gtube vented to gravity bag, scant output.  All meds given through J tube   Activity: Up with SBA to bedside commode  Pain: Denies except for rectal area discomfort from freq stooling    Lines/Drains/Drips: PIV SL, Gtube to gravity, J tube cont TF     Plan: Diurese. Possible bronch early to mid week.

## 2022-09-12 NOTE — PROGRESS NOTES
Hours of Care:  0700 - 1100    Temp:  [97.8  F (36.6  C)-98.7  F (37.1  C)] 98.7  F (37.1  C)  Pulse:  [] 97  Resp:  [20-24] 20  BP: (129-143)/(62-72) 142/72  SpO2:  [92 %-100 %] 100 %       A: Neuro: A/O x4.  Call light appropriate.  Able to make needs known.  Respiratory:  On room air.  Lung sounds clear.  Denies shortness of breath at rest.  Cardiac: VSS.    GI: Last BM 9/12.   Patient having loose, watery stools with fecal incontinence.  Has had 5 BM in 2 hours.  Hx negative for C Diff.  Received order for loperamide TID.  No report of nausea or vomiting.  : Urinating adequate amounts of clear, yellow urine  Endo:  Blood sugars Q4H.  Last .  1 unit insulin aspart administered  Skin:  See PCS for assessment and treatment of wounds and surgical incisions.  LDA:  20 G PIV LFA  Electrolytes: No RN managed electrolyte protocols  Pain: Reported 7/10 pain in rectum and back.  PRN oxycodone administered per request.  Isolation:  Standard Precautions  Tests/procedures: CXR ordered.  Patient unable to go down to radiology at this time due to diarrhea  Diet: NPO.  Continuous enteral feeds.  Novosource renal 35 mL/hr with 30 mL water flush Q4H    P: Continue to monitor Pt status and report changes to [facility]

## 2022-09-12 NOTE — PLAN OF CARE
D: Planned admission 9/9 from transplant pulmonology to work up antibody medicated rejection versus infection. Hx of end stage COPD s/p bilateral sequential lung transplant (6/22) on triple IS ( MMF/Tacro/pred), pyloric ulcer, recent ERCP c/f hemobilia, gastroparesis s/p GJ tube placement and Percutaneous J tube    I: Monitored vitals and assessed pt status.     Changed: NPO 09/13/2022 @ 0300 for bronch @ 0900 9/13 (hold TF)  Running: TF 35 ml/hr  PRN:      A: A&Ox4. VSS on 2L NC denies SOB at rest. Tele shows SR 70-80s.  Afebrile. Urinating adequately.  Diet NPO, Continuous enteral feeds. GJ tube, G to gravity,  J running continuous TF. No BM during time of care. CXR done see note for details. BG q4.        P: Continue to monitor pt status and report changes to [facility] treatment team. Bronch tomorrow @0900    [id number]

## 2022-09-12 NOTE — TELEPHONE ENCOUNTER
Please call daughter Charity # [phone].  Per daughter charity,'she has been trying to reach out to the floor team, regarding her mother's  condition but still has many question's was admitted with high antibodies and Charity is trying to get an answer.'

## 2022-09-12 NOTE — PROGRESS NOTES
Pulmonary Medicine  Cystic Fibrosis - Lung Transplant Team  Progress Note  2022       Patient: Sofie Rodriguez  MRN: [id number]  : 1960 (age 62 year old)  Transplant: 2022 (Lung), POD#76  Admission date: 2022    Assessment & Plan:     Sofie Rodriguez is a 62-year-old female s/p recent BSLT (22) for COPD complicated by persistent bilateral pleural effusions, persistent CO2 retention, right hemidiaphragm palsy, left radial artery thrombosis (2/2 arterial line) s/p thrombectomy, BACILIO, C. diff colitis, gastroparesis s/p GJ tube placement (22), GI bleed 2/2 pyloric ulcer, hemobilia s/p ERCP and MRCP (2022), and persistent vasoplegia.  Pre-transplant history also significant for HTN, HFpEF, NTM colonization, hepatitis C, and previous methamphetamine use.  Patient admitted 22 with persistent dyspnea (increased with activity), daily morning hemoptysis, and increased BLE edema.  Also noted to have persistent, increased bilateral pleural effusions, diffuse bilateral groundglass changes, and more focal appearing LLL infiltrates on imaging.  Treated with aggressive diuresis with some improvement in symptoms and hypoxia.     Today's recommendations:  - CXR today stable, defer CT at this time but low threshold to repeat image with decline  - IS and Aerobika q1h w/a  - Bronchoscopy with lavage and cultures tomorrow morning at 0900 with Dr. Mccullough, strict NPO (including TF and meds) after 0300  - Tacro level today cancelled (15h level, collected after AM dose), will repeat level tomorrow (ordered)  - Defer MMF reduction pending diarrhea workup and titration of loperamide  - Prednisone taper next due 9/15  - Enteric panel ordered today  - Loperamide 2 mg TID started today, goal to titrate to 3 stools daily (reporting 10 yesterday)    Acute hypoxia:   Pulmonary edema:  S/p bilateral sequential lung transplant (BSLT) for end stage COPD:  Persistent bibasilar pleural effusions:   Right  hemidiaphragm palsy:  Admitted with with increased shortness of breath, dyspnea with minimal exertion and small volume daily hemoptysis.  Also with marked decline in PFTs (FEV1 1.22L, 50% on 8/18 to 0.98L, 40% on 9/7).  Chest CT (9/8) with increased effusions and groundglass changes.  DSA positive but stable (as below).  BNP markedly elevated (30k) and also with increased BLE edema.  Etiology unclear and is likely multi-factorial with DDx including pulmonary edema from hypervolemia, rejection (ACR +/- AMR), alveolar hemorrhage, and infection.    - CXR today (modified to 1-view given frequent diarrhea) with stable diffuse mixed opacities and small bibasilar effusions (personally reviewed), defer CT at this time but low threshold to repeat image with decline  - Diuresis per primary  - Supplemental O2 to keep >92%  - IS and Aerobika q1h w/a (ordered)  - Bronchoscopy with lavage and cultures tomorrow morning at 0900 with Dr. Mccullough, strict NPO (including TF and meds) after 0300    Immunosuppression:  - Tacrolimus 3.5 mg BID.  Goal level 8-12.  Level today cancelled (15h level, collected after AM dose).  Will repeat level tomorrow (ordered).  -  mg BID, defer dose reduction pending diarrhea workup and titration of loperamide  - Prednisone 10 mg BID.  Next taper due 9/15 (not yet ordered).    Prophylaxis:   - Bactrim for PJP ppx deferred for now (previously stopped d/t hyperkalemia, dapsone stopped d/t anemia, Pentamidine neb not tolerated), will retrial if potassium remains stable  - VGCV for CMV ppx through 9/28, D+/R+, recent CMV negative 9/7    Positive DSA: Newly positive on 8/10 with DQB2 mfi 2155.  Most recent DSA on 9/7 with decreased DQB5 from prior (7033-->5744).  - Following monthly as OP    EBV viremia: Low level (1374 on 9/7), not likely to be clinically significant.  - Following monthly as OP    Other relevant problems being managed by the primary team:    Diarrhea: Etiology unclear.  C diff negative on  9/10.  May be medication related (MMF), but unable to transition to Myfortic given NPO and reluctant to switch to azathioprine until etiology of dyspnea is clarified.  - Enteric panel ordered today  - Loperamide 2 mg TID started today, goal to titrate to 3 stools daily (reporting 10 yesterday)     BACILIO: Patient required a therapeutic dose of tacrolimus.  Continue efforts at diuresis  Avoid other nephrotoxins.      We appreciate the excellent care provided by the [place] [id number] team.  Recommendations communicated via in person rounding and this note.  Will continue to follow along closely, please do not hesitate to call with any questions or concerns.    Patient discussed with Dr. Gong.    Catherine Johnson, DNP, APRN, CNP  Inpatient Nurse Practitioner  Pulmonary CF/Transplant     Subjective & Interval History:     Remains on 1.5 L NC with minimal hemoptysis, cough is infrequent and generally dry.  No change to ANAYA.  Poor sleep overnight d/t frequent loose stools, reports 5 stools overnight and 5 stools yesterday.  No N/V, some abdominal cramping as expected.  Peripheral edema largely unchanged to slightly improved.     Review of Systems:     C: No fever, no chills, no change in weight  INTEGUMENTARY/SKIN: No rash or obvious new lesions  ENT/MOUTH: No sore throat, no sinus pain, no nasal congestion or drainage  RESP: See interval history  CV: No chest pain, no palpitations  GI: No reflux symptoms  : No dysuria  MUSCULOSKELETAL: No myalgias, no arthralgias  ENDOCRINE: Blood sugars intermittently elevated  NEURO: No headache, no numbness or tingling  PSYCHIATRIC: Mood stable    Physical Exam:     All notes, images, and labs from past 24 hours (at minimum) were reviewed.    Vital signs:  Temp: 98.7  F (37.1  C) Temp src: Oral BP: (!) 142/72 Pulse: 97   Resp: 20 SpO2: 100 % O2 Device: Nasal cannula Oxygen Delivery: 1.5 LPM   Weight: 72.5 kg (159 lb 14.4 oz)  I/O:     Intake/Output Summary (Last 24 hours) at 9/12/2022  1107  Last data filed at 9/12/2022 0836  Gross per 24 hour   Intake 920 ml   Output 1380 ml   Net -460 ml     Constitutional: Sitting up in bed, in no apparent distress.   HEENT: Eyes with pink conjunctivae, anicteric.  Oral mucosa moist without lesions.    PULM: Mildly diminished air flow bilaterally.  No crackles, no rhonchi, no wheezes.  Non-labored breathing on 1.5L (88% on RA).  CV: Normal S1 and S2.  RRR.  No murmur, gallop, or rub.  1-2+ BLE edema.   ABD: NABS, soft, nontender, nondistended.  PEG/J tube site with scant tan drainage.  MSK: Moves all extremities.  No apparent muscle wasting.   NEURO: Alert and oriented, conversant.   SKIN: Warm, dry.  No rash on limited exam.   PSYCH: Mood stable.     Lines, Drains, and Devices:  Peripheral IV 09/10/22 Anterior;Left Lower forearm (Active)   Site Assessment WDL 09/12/22 0400   Line Status Saline locked 09/12/22 0400   Dressing Intervention New dressing  09/10/22 0135   Phlebitis Scale 0-->no symptoms 09/12/22 0400   Infiltration Scale 0 09/12/22 0400   Number of days: 2     Data:     LABS    CMP:   Recent Labs   Lab 09/12/22  0924 09/12/22  0851 09/12/22  0424 09/11/22  2156 09/11/22  1805 09/11/22  1732 09/11/22  1430 09/11/22  1024 09/10/22  0814 09/10/22  0712 09/08/22  0737 09/07/22  0858   NA  --  144  --   --   --  144  --  145  --  144   < > 144   POTASSIUM  --  3.6  --   --   --  3.8  --  3.7  --  4.0   < > 3.8   CHLORIDE  --  95*  --   --   --  94*  --  94*  --  95*   < > 93*   CO2  --  46*  --   --   --  48*  --  49*  --  48*   < > 43*   ANIONGAP  --  3*  --   --   --  2*  --  2*  --  1*   < > 8   * 162* 200* 151*   < > 178*   < > 165*   < > 184*   < > 87   BUN  --  74.4*  --   --   --  76.0*  --  76.0*  --  76.5*   < > 90.3*   CR  --  1.42*  --   --   --  1.53*  --  1.57*  --  1.45*   < > 1.90*   GFRESTIMATED  --  42*  --   --   --  39*  --  37*  --  41*   < > 30*   ESTUARDO  --  9.2  --   --   --  9.2  --  9.3  --  9.1   < > 9.4   MAG  --   --   --    --   --   --   --   --   --  2.3  --  2.7*   PHOS  --   --   --   --   --  2.3*  --   --   --  3.1  --   --    PROTTOTAL  --   --   --   --   --   --   --   --   --  5.5*  --  6.0*   ALBUMIN  --   --   --   --   --  3.3*  --   --   --  3.1*  --  3.4*   BILITOTAL  --   --   --   --   --   --   --   --   --  0.2  --  0.2   ALKPHOS  --   --   --   --   --   --   --   --   --  88  --  98   AST  --   --   --   --   --   --   --   --   --  17  --  16   ALT  --   --   --   --   --   --   --   --   --  11  --  14    < > = values in this interval not displayed.     CBC:   Recent Labs   Lab 09/12/22  0851 09/11/22  1024 09/10/22  0712 09/09/22  1811   WBC 8.0 9.3 7.7 8.6   RBC 2.52* 2.60* 2.38* 2.39*   HGB 7.6* 7.8* 7.2* 7.1*   HCT 26.8* 26.9* 25.1* 24.6*   * 104* 106* 103*   MCH 30.2 30.0 30.3 29.7   MCHC 28.4* 29.0* 28.7* 28.9*   RDW 19.6* 19.5* 19.5* 19.2*    282 271 305       INR: No lab results found in last 7 days.    Glucose:   Recent Labs   Lab 09/12/22  0924 09/12/22  0851 09/12/22  0424 09/11/22  2156 09/11/22  1805 09/11/22  1732   * 162* 200* 151* 174* 178*       Blood Gas:   Recent Labs   Lab 09/12/22  0851 09/11/22  1732 09/11/22  1230   PHV 7.36 7.43 7.40   PCO2V 81* 75* 82*   PO2V 45 29 23*   HCO3V 46* 50* 50*   LINDY 15.5* 21.8* 23.1*   O2PER 2 2 2       Culture Data No results for input(s): CULT in the last 168 hours.    Virology Data:   Lab Results   Component Value Date    FLUAH1 Not Detected 06/29/2022    FLUAH3 Not Detected 06/29/2022    PS9148 Not Detected 06/29/2022    IFLUB Not Detected 06/29/2022    RSVA Not Detected 06/29/2022    RSVB Not Detected 06/29/2022    PIV1 Not Detected 06/29/2022    PIV2 Not Detected 06/29/2022    PIV3 Not Detected 06/29/2022    HMPV Not Detected 06/29/2022       Historical CMV results (last 3 of prior testing):  Lab Results   Component Value Date    CMVQNT Not Detected 09/07/2022    CMVQNT Not Detected 09/01/2022    CMVQNT Not Detected 08/29/2022      No results found for: CMVLOG    Urine Studies    Recent Labs   Lab Test 08/01/22  0319 07/08/22  0831 07/05/22  1004   URINEPH 8.0* 5.5 5.5   NITRITE Negative Negative Negative   LEUKEST Negative Negative Negative   WBCU  --  1 5       Most Recent [facility] Pulmonary Function Testing (FVC/FEV1 only)  FVC-Pre   Date Value Ref Range Status   09/07/2022 1.01 L    09/01/2022 1.07 L    08/24/2022 1.23 L    08/18/2022 1.33 L      FVC-%Pred-Pre   Date Value Ref Range Status   09/07/2022 33 %    09/01/2022 35 %    08/24/2022 40 %    08/18/2022 43 %      FEV1-Pre   Date Value Ref Range Status   09/07/2022 0.98 L    09/01/2022 1.02 L    08/24/2022 1.17 L    08/18/2022 1.22 L      FEV1-%Pred-Pre   Date Value Ref Range Status   09/07/2022 40 %    09/01/2022 42 %    08/24/2022 48 %    08/18/2022 50 %        IMAGING    No results found for this or any previous visit (from the past 48 hour(s)).

## 2022-09-13 ENCOUNTER — APPOINTMENT (OUTPATIENT)
Dept: GENERAL RADIOLOGY | Facility: CLINIC | Age: 62
DRG: 205 | End: 2022-09-13
Attending: PHYSICIAN ASSISTANT
Payer: MEDICARE

## 2022-09-13 ENCOUNTER — APPOINTMENT (OUTPATIENT)
Dept: PHYSICAL THERAPY | Facility: CLINIC | Age: 62
DRG: 205 | End: 2022-09-13
Attending: PEDIATRICS
Payer: MEDICARE

## 2022-09-13 ENCOUNTER — TELEPHONE (OUTPATIENT)
Dept: TRANSPLANT | Facility: CLINIC | Age: 62
End: 2022-09-13

## 2022-09-13 ENCOUNTER — TELEPHONE (OUTPATIENT)
Dept: PHARMACY | Facility: CLINIC | Age: 62
End: 2022-09-13

## 2022-09-13 ENCOUNTER — PRE VISIT (OUTPATIENT)
Dept: ENDOCRINOLOGY | Facility: CLINIC | Age: 62
End: 2022-09-13

## 2022-09-13 ENCOUNTER — APPOINTMENT (OUTPATIENT)
Dept: GENERAL RADIOLOGY | Facility: CLINIC | Age: 62
DRG: 205 | End: 2022-09-13
Attending: INTERNAL MEDICINE
Payer: MEDICARE

## 2022-09-13 LAB
ALBUMIN BODY FLUID SOURCE: NORMAL
ALBUMIN FLD-MCNC: 1.6 G/DL
AMYLASE BODY FLUID SOURCE: NORMAL
AMYLASE FLD-CCNC: 19 U/L
ANION GAP SERPL CALCULATED.3IONS-SCNC: 3 MMOL/L (ref 7–15)
APPEARANCE FLD: ABNORMAL
APPEARANCE FLD: ABNORMAL
BASE EXCESS BLDV CALC-SCNC: 17.9 MMOL/L (ref -7.7–1.9)
BUN SERPL-MCNC: 74.6 MG/DL (ref 8–23)
C PNEUM DNA SPEC QL NAA+PROBE: NOT DETECTED
CALCIUM SERPL-MCNC: 9.2 MG/DL (ref 8.8–10.2)
CELL COUNT BODY FLUID SOURCE: ABNORMAL
CELL COUNT BODY FLUID SOURCE: ABNORMAL
CHLORIDE SERPL-SCNC: 93 MMOL/L (ref 98–107)
CHOLEST FLD-MCNC: 45 MG/DL
CHOLESTEROL BODY FLUID SOURCE: NORMAL
COLOR FLD: ABNORMAL
COLOR FLD: ABNORMAL
CREAT SERPL-MCNC: 1.38 MG/DL (ref 0.51–0.95)
DEPRECATED HCO3 PLAS-SCNC: 47 MMOL/L (ref 22–29)
EOSINOPHIL NFR FLD MANUAL: 1 %
ERYTHROCYTE [DISTWIDTH] IN BLOOD BY AUTOMATED COUNT: 19.4 % (ref 10–15)
FLUAV H1 2009 PAND RNA SPEC QL NAA+PROBE: NOT DETECTED
FLUAV H1 RNA SPEC QL NAA+PROBE: NOT DETECTED
FLUAV H3 RNA SPEC QL NAA+PROBE: NOT DETECTED
FLUAV RNA SPEC QL NAA+PROBE: NOT DETECTED
FLUBV RNA SPEC QL NAA+PROBE: NOT DETECTED
GFR SERPL CREATININE-BSD FRML MDRD: 44 ML/MIN/1.73M2
GLUCOSE BLDC GLUCOMTR-MCNC: 123 MG/DL (ref 70–99)
GLUCOSE BLDC GLUCOMTR-MCNC: 142 MG/DL (ref 70–99)
GLUCOSE BLDC GLUCOMTR-MCNC: 156 MG/DL (ref 70–99)
GLUCOSE BLDC GLUCOMTR-MCNC: 157 MG/DL (ref 70–99)
GLUCOSE BLDC GLUCOMTR-MCNC: 167 MG/DL (ref 70–99)
GLUCOSE BLDC GLUCOMTR-MCNC: 201 MG/DL (ref 70–99)
GLUCOSE BLDC GLUCOMTR-MCNC: 209 MG/DL (ref 70–99)
GLUCOSE BODY FLUID SOURCE: NORMAL
GLUCOSE FLD-MCNC: 141 MG/DL
GLUCOSE SERPL-MCNC: 145 MG/DL (ref 70–99)
GRAM STAIN RESULT: ABNORMAL
GRAM STAIN RESULT: ABNORMAL
GRAM STAIN RESULT: NORMAL
HADV DNA SPEC QL NAA+PROBE: NOT DETECTED
HCO3 BLDV-SCNC: 47 MMOL/L (ref 21–28)
HCOV PNL SPEC NAA+PROBE: NOT DETECTED
HCT VFR BLD AUTO: 25.9 % (ref 35–47)
HGB BLD-MCNC: 7.5 G/DL (ref 11.7–15.7)
HMPV RNA SPEC QL NAA+PROBE: NOT DETECTED
HPIV1 RNA SPEC QL NAA+PROBE: NOT DETECTED
HPIV2 RNA SPEC QL NAA+PROBE: NOT DETECTED
HPIV3 RNA SPEC QL NAA+PROBE: NOT DETECTED
HPIV4 RNA SPEC QL NAA+PROBE: NOT DETECTED
KOH PREPARATION: NORMAL
KOH PREPARATION: NORMAL
LD BODY BODY FLUID SOURCE: NORMAL
LDH FLD L TO P-CCNC: 104 U/L
LDH SERPL L TO P-CCNC: 249 U/L (ref 0–250)
LIPASE BODY FLUID SOURCE: NORMAL
LIPASE FLD-CCNC: 4 U/L
LYMPHOCYTES NFR FLD MANUAL: 17 %
LYMPHOCYTES NFR FLD MANUAL: 39 %
M PNEUMO DNA SPEC QL NAA+PROBE: NOT DETECTED
MCH RBC QN AUTO: 30.6 PG (ref 26.5–33)
MCHC RBC AUTO-ENTMCNC: 29 G/DL (ref 31.5–36.5)
MCV RBC AUTO: 106 FL (ref 78–100)
MONOS+MACROS NFR FLD MANUAL: 31 %
MONOS+MACROS NFR FLD MANUAL: 75 %
NEUTS BAND NFR FLD MANUAL: 29 %
NEUTS BAND NFR FLD MANUAL: 8 %
O2/TOTAL GAS SETTING VFR VENT: 30 %
PCO2 BLDV: 82 MM HG (ref 40–50)
PH BLDV: 7.36 [PH] (ref 7.32–7.43)
PLATELET # BLD AUTO: 300 10E3/UL (ref 150–450)
PO2 BLDV: 109 MM HG (ref 25–47)
POTASSIUM SERPL-SCNC: 4.1 MMOL/L (ref 3.4–5.3)
PROT FLD-MCNC: 2.3 G/DL
PROT SERPL-MCNC: 5.6 G/DL (ref 6.4–8.3)
PROTEIN BODY FLUID SOURCE: NORMAL
RBC # BLD AUTO: 2.45 10E6/UL (ref 3.8–5.2)
RSV RNA SPEC QL NAA+PROBE: NOT DETECTED
RSV RNA SPEC QL NAA+PROBE: NOT DETECTED
RV+EV RNA SPEC QL NAA+PROBE: NOT DETECTED
SODIUM SERPL-SCNC: 143 MMOL/L (ref 136–145)
TACROLIMUS BLD-MCNC: 10.7 UG/L (ref 5–15)
TME LAST DOSE: NORMAL H
TME LAST DOSE: NORMAL H
TRIGL FLD-MCNC: 29 MG/DL
TRIGLYCERIDE BODY FLUID SOURCE: NORMAL
WBC # BLD AUTO: 7.9 10E3/UL (ref 4–11)
WBC # FLD AUTO: 109 /UL
WBC # FLD AUTO: 262 /UL

## 2022-09-13 PROCEDURE — 87205 SMEAR GRAM STAIN: CPT | Performed by: INTERNAL MEDICINE

## 2022-09-13 PROCEDURE — 84157 ASSAY OF PROTEIN OTHER: CPT | Performed by: INTERNAL MEDICINE

## 2022-09-13 PROCEDURE — 250N000012 HC RX MED GY IP 250 OP 636 PS 637

## 2022-09-13 PROCEDURE — 88189 FLOWCYTOMETRY/READ 16 & >: CPT | Mod: GC | Performed by: STUDENT IN AN ORGANIZED HEALTH CARE EDUCATION/TRAINING PROGRAM

## 2022-09-13 PROCEDURE — 88112 CYTOPATH CELL ENHANCE TECH: CPT | Mod: TC | Performed by: NURSE PRACTITIONER

## 2022-09-13 PROCEDURE — 250N000009 HC RX 250: Performed by: INTERNAL MEDICINE

## 2022-09-13 PROCEDURE — 0B9M8ZX DRAINAGE OF BILATERAL LUNGS, VIA NATURAL OR ARTIFICIAL OPENING ENDOSCOPIC, DIAGNOSTIC: ICD-10-PCS | Performed by: INTERNAL MEDICINE

## 2022-09-13 PROCEDURE — 87070 CULTURE OTHR SPECIMN AEROBIC: CPT | Performed by: INTERNAL MEDICINE

## 2022-09-13 PROCEDURE — 97161 PT EVAL LOW COMPLEX 20 MIN: CPT | Mod: GP

## 2022-09-13 PROCEDURE — 82664 ELECTROPHORETIC TEST: CPT | Performed by: INTERNAL MEDICINE

## 2022-09-13 PROCEDURE — 89050 BODY FLUID CELL COUNT: CPT | Performed by: INTERNAL MEDICINE

## 2022-09-13 PROCEDURE — 71045 X-RAY EXAM CHEST 1 VIEW: CPT | Mod: 26 | Performed by: RADIOLOGY

## 2022-09-13 PROCEDURE — 0W9900Z DRAINAGE OF RIGHT PLEURAL CAVITY WITH DRAINAGE DEVICE, OPEN APPROACH: ICD-10-PCS | Performed by: INTERNAL MEDICINE

## 2022-09-13 PROCEDURE — 999N000065 XR CHEST PORT 1 VIEW

## 2022-09-13 PROCEDURE — 80048 BASIC METABOLIC PNL TOTAL CA: CPT | Performed by: INTERNAL MEDICINE

## 2022-09-13 PROCEDURE — 83690 ASSAY OF LIPASE: CPT | Performed by: INTERNAL MEDICINE

## 2022-09-13 PROCEDURE — 88305 TISSUE EXAM BY PATHOLOGIST: CPT | Mod: TC | Performed by: NURSE PRACTITIONER

## 2022-09-13 PROCEDURE — 214N000001 HC R&B CCU UMMC

## 2022-09-13 PROCEDURE — 999N000157 HC STATISTIC RCP TIME EA 10 MIN

## 2022-09-13 PROCEDURE — 82042 OTHER SOURCE ALBUMIN QUAN EA: CPT | Performed by: INTERNAL MEDICINE

## 2022-09-13 PROCEDURE — 31624 DX BRONCHOSCOPE/LAVAGE: CPT | Performed by: INTERNAL MEDICINE

## 2022-09-13 PROCEDURE — 32551 INSERTION OF CHEST TUBE: CPT | Performed by: INTERNAL MEDICINE

## 2022-09-13 PROCEDURE — 99233 SBSQ HOSP IP/OBS HIGH 50: CPT | Performed by: STUDENT IN AN ORGANIZED HEALTH CARE EDUCATION/TRAINING PROGRAM

## 2022-09-13 PROCEDURE — 84478 ASSAY OF TRIGLYCERIDES: CPT | Performed by: INTERNAL MEDICINE

## 2022-09-13 PROCEDURE — 0W9B3ZZ DRAINAGE OF LEFT PLEURAL CAVITY, PERCUTANEOUS APPROACH: ICD-10-PCS | Performed by: INTERNAL MEDICINE

## 2022-09-13 PROCEDURE — 999N000111 HC STATISTIC OT IP EVAL DEFER: Performed by: OCCUPATIONAL THERAPIST

## 2022-09-13 PROCEDURE — 87581 M.PNEUMON DNA AMP PROBE: CPT | Performed by: INTERNAL MEDICINE

## 2022-09-13 PROCEDURE — 87206 SMEAR FLUORESCENT/ACID STAI: CPT | Performed by: INTERNAL MEDICINE

## 2022-09-13 PROCEDURE — 36415 COLL VENOUS BLD VENIPUNCTURE: CPT | Performed by: NURSE PRACTITIONER

## 2022-09-13 PROCEDURE — 99233 SBSQ HOSP IP/OBS HIGH 50: CPT | Mod: 24 | Performed by: NURSE PRACTITIONER

## 2022-09-13 PROCEDURE — 250N000011 HC RX IP 250 OP 636: Performed by: INTERNAL MEDICINE

## 2022-09-13 PROCEDURE — G0500 MOD SEDAT ENDO SERVICE >5YRS: HCPCS | Performed by: INTERNAL MEDICINE

## 2022-09-13 PROCEDURE — 83615 LACTATE (LD) (LDH) ENZYME: CPT | Performed by: NURSE PRACTITIONER

## 2022-09-13 PROCEDURE — 88112 CYTOPATH CELL ENHANCE TECH: CPT | Mod: TC | Performed by: INTERNAL MEDICINE

## 2022-09-13 PROCEDURE — 99152 MOD SED SAME PHYS/QHP 5/>YRS: CPT | Performed by: INTERNAL MEDICINE

## 2022-09-13 PROCEDURE — 88185 FLOWCYTOMETRY/TC ADD-ON: CPT | Performed by: INTERNAL MEDICINE

## 2022-09-13 PROCEDURE — 250N000012 HC RX MED GY IP 250 OP 636 PS 637: Performed by: INTERNAL MEDICINE

## 2022-09-13 PROCEDURE — 88184 FLOWCYTOMETRY/ TC 1 MARKER: CPT | Performed by: INTERNAL MEDICINE

## 2022-09-13 PROCEDURE — 85027 COMPLETE CBC AUTOMATED: CPT | Performed by: INTERNAL MEDICINE

## 2022-09-13 PROCEDURE — 84311 SPECTROPHOTOMETRY: CPT | Performed by: INTERNAL MEDICINE

## 2022-09-13 PROCEDURE — 89051 BODY FLUID CELL COUNT: CPT | Performed by: INTERNAL MEDICINE

## 2022-09-13 PROCEDURE — 82150 ASSAY OF AMYLASE: CPT | Performed by: INTERNAL MEDICINE

## 2022-09-13 PROCEDURE — 94660 CPAP INITIATION&MGMT: CPT | Mod: ZZRT

## 2022-09-13 PROCEDURE — 84155 ASSAY OF PROTEIN SERUM: CPT | Performed by: NURSE PRACTITIONER

## 2022-09-13 PROCEDURE — 250N000013 HC RX MED GY IP 250 OP 250 PS 637: Performed by: PEDIATRICS

## 2022-09-13 PROCEDURE — 32551 INSERTION OF CHEST TUBE: CPT | Mod: 59 | Performed by: INTERNAL MEDICINE

## 2022-09-13 PROCEDURE — 82465 ASSAY BLD/SERUM CHOLESTEROL: CPT | Performed by: INTERNAL MEDICINE

## 2022-09-13 PROCEDURE — 97530 THERAPEUTIC ACTIVITIES: CPT | Mod: GP

## 2022-09-13 PROCEDURE — 99207 PR NON-BILLABLE SERV PER CHARTING: CPT | Performed by: NURSE PRACTITIONER

## 2022-09-13 PROCEDURE — 250N000013 HC RX MED GY IP 250 OP 250 PS 637

## 2022-09-13 PROCEDURE — 32554 ASPIRATE PLEURA W/O IMAGING: CPT | Performed by: INTERNAL MEDICINE

## 2022-09-13 PROCEDURE — 250N000012 HC RX MED GY IP 250 OP 636 PS 637: Performed by: PHYSICIAN ASSISTANT

## 2022-09-13 PROCEDURE — 87102 FUNGUS ISOLATION CULTURE: CPT | Performed by: INTERNAL MEDICINE

## 2022-09-13 PROCEDURE — 87210 SMEAR WET MOUNT SALINE/INK: CPT | Performed by: INTERNAL MEDICINE

## 2022-09-13 PROCEDURE — 250N000013 HC RX MED GY IP 250 OP 250 PS 637: Performed by: NURSE PRACTITIONER

## 2022-09-13 PROCEDURE — 80197 ASSAY OF TACROLIMUS: CPT | Performed by: NURSE PRACTITIONER

## 2022-09-13 PROCEDURE — 88305 TISSUE EXAM BY PATHOLOGIST: CPT | Mod: TC | Performed by: INTERNAL MEDICINE

## 2022-09-13 PROCEDURE — 83615 LACTATE (LD) (LDH) ENZYME: CPT | Performed by: INTERNAL MEDICINE

## 2022-09-13 PROCEDURE — 82803 BLOOD GASES ANY COMBINATION: CPT | Performed by: INTERNAL MEDICINE

## 2022-09-13 PROCEDURE — 88312 SPECIAL STAINS GROUP 1: CPT | Mod: TC | Performed by: INTERNAL MEDICINE

## 2022-09-13 PROCEDURE — 82945 GLUCOSE OTHER FLUID: CPT | Performed by: INTERNAL MEDICINE

## 2022-09-13 PROCEDURE — 99153 MOD SED SAME PHYS/QHP EA: CPT | Performed by: INTERNAL MEDICINE

## 2022-09-13 PROCEDURE — 32555 ASPIRATE PLEURA W/ IMAGING: CPT | Performed by: INTERNAL MEDICINE

## 2022-09-13 RX ORDER — PREDNISONE 10 MG/1
10 TABLET ORAL DAILY
Status: DISCONTINUED | OUTPATIENT
Start: 2022-09-15 | End: 2022-09-23

## 2022-09-13 RX ORDER — LIDOCAINE HYDROCHLORIDE 10 MG/ML
INJECTION, SOLUTION INFILTRATION; PERINEURAL PRN
Status: COMPLETED | OUTPATIENT
Start: 2022-09-13 | End: 2022-09-13

## 2022-09-13 RX ORDER — LIDOCAINE 4 G/G
1 PATCH TOPICAL
Status: DISCONTINUED | OUTPATIENT
Start: 2022-09-13 | End: 2022-09-14

## 2022-09-13 RX ORDER — LIDOCAINE HYDROCHLORIDE 40 MG/ML
INJECTION, SOLUTION RETROBULBAR PRN
Status: COMPLETED | OUTPATIENT
Start: 2022-09-13 | End: 2022-09-13

## 2022-09-13 RX ORDER — MAGNESIUM HYDROXIDE 1200 MG/15ML
LIQUID ORAL PRN
Status: COMPLETED | OUTPATIENT
Start: 2022-09-13 | End: 2022-09-13

## 2022-09-13 RX ORDER — FENTANYL CITRATE 50 UG/ML
INJECTION, SOLUTION INTRAMUSCULAR; INTRAVENOUS PRN
Status: COMPLETED | OUTPATIENT
Start: 2022-09-13 | End: 2022-09-13

## 2022-09-13 RX ORDER — SULFAMETHOXAZOLE AND TRIMETHOPRIM 200; 40 MG/5ML; MG/5ML
10 SUSPENSION ORAL
Status: DISCONTINUED | OUTPATIENT
Start: 2022-09-14 | End: 2022-09-15

## 2022-09-13 RX ADMIN — NYSTATIN 1000000 UNITS: 100000 SUSPENSION ORAL at 15:31

## 2022-09-13 RX ADMIN — SODIUM CHLORIDE 120 ML: 900 IRRIGANT IRRIGATION at 10:18

## 2022-09-13 RX ADMIN — FUROSEMIDE 40 MG: 10 INJECTION, SOLUTION INTRAVENOUS at 10:58

## 2022-09-13 RX ADMIN — LIDOCAINE HYDROCHLORIDE 10 ML: 40 INJECTION, SOLUTION RETROBULBAR; TOPICAL at 10:09

## 2022-09-13 RX ADMIN — METOPROLOL TARTRATE 50 MG: 50 TABLET, FILM COATED ORAL at 08:11

## 2022-09-13 RX ADMIN — MIDAZOLAM 0.5 MG: 1 INJECTION INTRAMUSCULAR; INTRAVENOUS at 09:54

## 2022-09-13 RX ADMIN — LOPERAMIDE HYDROCHLORIDE 2 MG: 2 CAPSULE ORAL at 10:58

## 2022-09-13 RX ADMIN — NYSTATIN 1000000 UNITS: 100000 SUSPENSION ORAL at 20:48

## 2022-09-13 RX ADMIN — INSULIN ASPART 2 UNITS: 100 INJECTION, SOLUTION INTRAVENOUS; SUBCUTANEOUS at 23:36

## 2022-09-13 RX ADMIN — LIDOCAINE HYDROCHLORIDE 9 ML: 40 INJECTION, SOLUTION RETROBULBAR; TOPICAL at 10:13

## 2022-09-13 RX ADMIN — CALCIUM CARBONATE 600 MG (1,500 MG)-VITAMIN D3 400 UNIT TABLET 1 TABLET: at 17:33

## 2022-09-13 RX ADMIN — MENTHOL 1 PATCH: 205.5 PATCH TOPICAL at 14:32

## 2022-09-13 RX ADMIN — Medication 40 MG: at 20:47

## 2022-09-13 RX ADMIN — TACROLIMUS 3.5 MG: 5 CAPSULE ORAL at 08:11

## 2022-09-13 RX ADMIN — Medication 1 PACKET: at 15:48

## 2022-09-13 RX ADMIN — ACETAMINOPHEN 975 MG: 325 TABLET, FILM COATED ORAL at 10:57

## 2022-09-13 RX ADMIN — TOPICAL ANESTHETIC 1 SPRAY: 200 SPRAY DENTAL; PERIODONTAL at 10:10

## 2022-09-13 RX ADMIN — PREDNISONE 10 MG: 10 TABLET ORAL at 08:11

## 2022-09-13 RX ADMIN — FENTANYL CITRATE 25 MCG: 50 INJECTION, SOLUTION INTRAMUSCULAR; INTRAVENOUS at 09:54

## 2022-09-13 RX ADMIN — OXYCODONE HYDROCHLORIDE 5 MG: 5 SOLUTION ORAL at 20:49

## 2022-09-13 RX ADMIN — MYCOPHENOLATE MOFETIL 750 MG: 200 POWDER, FOR SUSPENSION ORAL at 08:11

## 2022-09-13 RX ADMIN — INSULIN ASPART 2 UNITS: 100 INJECTION, SOLUTION INTRAVENOUS; SUBCUTANEOUS at 16:22

## 2022-09-13 RX ADMIN — NYSTATIN 1000000 UNITS: 100000 SUSPENSION ORAL at 12:22

## 2022-09-13 RX ADMIN — MULTIVITAMIN 15 ML: LIQUID ORAL at 12:22

## 2022-09-13 RX ADMIN — CALCIUM CARBONATE 600 MG (1,500 MG)-VITAMIN D3 400 UNIT TABLET 1 TABLET: at 10:58

## 2022-09-13 RX ADMIN — PREDNISONE 10 MG: 10 TABLET ORAL at 20:48

## 2022-09-13 RX ADMIN — TACROLIMUS 3.5 MG: 5 CAPSULE ORAL at 17:33

## 2022-09-13 RX ADMIN — ACETAZOLAMIDE 125 MG: 125 TABLET ORAL at 15:37

## 2022-09-13 RX ADMIN — ACETAMINOPHEN 975 MG: 325 TABLET, FILM COATED ORAL at 15:31

## 2022-09-13 RX ADMIN — LIDOCAINE HYDROCHLORIDE 15 ML: 10 INJECTION, SOLUTION INFILTRATION; PERINEURAL at 10:14

## 2022-09-13 RX ADMIN — INSULIN ASPART 1 UNITS: 100 INJECTION, SOLUTION INTRAVENOUS; SUBCUTANEOUS at 21:15

## 2022-09-13 RX ADMIN — OXYCODONE HYDROCHLORIDE 5 MG: 5 SOLUTION ORAL at 11:19

## 2022-09-13 RX ADMIN — INSULIN ASPART 1 UNITS: 100 INJECTION, SOLUTION INTRAVENOUS; SUBCUTANEOUS at 12:23

## 2022-09-13 RX ADMIN — ACETAZOLAMIDE 125 MG: 125 TABLET ORAL at 10:57

## 2022-09-13 RX ADMIN — MIDAZOLAM 0.5 MG: 1 INJECTION INTRAMUSCULAR; INTRAVENOUS at 09:27

## 2022-09-13 RX ADMIN — Medication 40 MG: at 10:57

## 2022-09-13 RX ADMIN — FENTANYL CITRATE 25 MCG: 50 INJECTION, SOLUTION INTRAMUSCULAR; INTRAVENOUS at 09:27

## 2022-09-13 RX ADMIN — MYCOPHENOLATE MOFETIL 750 MG: 200 POWDER, FOR SUSPENSION ORAL at 20:48

## 2022-09-13 RX ADMIN — ACETAMINOPHEN 975 MG: 325 TABLET, FILM COATED ORAL at 20:48

## 2022-09-13 RX ADMIN — METOPROLOL TARTRATE 50 MG: 50 TABLET, FILM COATED ORAL at 20:48

## 2022-09-13 ASSESSMENT — ACTIVITIES OF DAILY LIVING (ADL)
ADLS_ACUITY_SCORE: 28

## 2022-09-13 NOTE — OR NURSING
Procedure: Chest tube to right chest, thoracentesis on the Left and Bronch with BALs  Sedation: conscious sedation  Specimens: sent to lab.   O2: 2-7L/NC and oxiplus. M#L/NC post proecdure  Tolerated procedure: well  Other:  Report called to 6C patient returned to room with RIYA.     Josefina Marks RN

## 2022-09-13 NOTE — PROGRESS NOTES
[facility]    Medicine Progress Note - Hospitalist Service, GOLD TEAM 10    Date of Admission:  9/9/2022    Assessment & Plan        Sofie Rodriguez is a 62 year old female with pertinent hx of end stage COPD s/p bilateral sequential lung transplant (6/22) on triple IS ( MMF/Tacro/pred), pyloric ulcer, recent ERCP c/f hemobilia, gastroparesis s/p GJ tube placement and Percutaneous J tube presents for a planned admission from transplant pulmonology to work up antibody medicated rejection versus infection.     Today:  Achieving adequate diuresis.  Hypoxia improved.      Today's Changes:  1. Continue diuresis   Furosemide 40 IV daily   Monitor electrolytes closely   Strict I/s/Os  2. Acetazolamide for alkalosis   Monitor VBG Q12 hrs  3. Low threshold for initiating empiric antibiotics  4. Consider Bipap at night for CO2 retention  5. Schedule imodium for diarrhea (c. Diff negative)  6. Lung transplant management per transplant pulmonary team, repeat CXR and possibly CT today        # End stage COPD s/p bilateral sequential lung transplant (6/22)   # hx of Bilateral hydrothorax after tx  # persistent hypercapnia   # Mycobacterium peregrinum colonization     Baseline oxygen need - 1L NC.   Recent imaging - 9/8/22 CT chest - R>L pleural effusions with bilateral hazy                              nodular opacities and paraseptal thickening.   PFT - FEV1 40%, FVC - 33% FEV1/FVC 79%  Immune suppression - Tacrolimus 3.5 mg BID Goal level 8-12,  mg                                         BID ( decreased dose in the setting of GI symptoms),                                        prednisone taper currently on 10 mg BID until 9/15/22.  CMV ppx - Valgancyclovir 450 mg TID ( ending 9/28)   Oral candida ppx - Nystatin   Diuresis -  PTA Furosemide 20mg once  Pain - PTA PRN oxycodone ordered   Spoke to pulmonology fellow about patient.     - Formal tx pulmonology consult placed.   -  Consider CAP coverage in signs of sepsis appear     # >3 X Loose stool, abd pain c/f C diff infection vs MMF side effect   # hx of C diff   Patient is on MMF with chronic loose stools at baseline. Notes reflect recent dose reduction of MMF for this. Patient reports acute worsening of C diff in the past 2-3 days clear stools and abdominal pain.   DDx - C diff (negtaive on 9/10) versus MMF side effect vs wide infectious bacterial and viral etiology. Also considered cathartic effect of blood in the small bowel lumen.     # Pyloric ulcer EGD 8/3/2022  # Acute on chronic macrocytic anemia    hx of coffee ground emesis. Patient had a EGD 8/3 which showed a Clear base ulcer in the pyloric channel identified as the culprit lesion. At the time was also noted to have excess gastric accumulation 2/2 pyloric inflammation. Vent G tube placed to allow for drainage. Vented to gravity now.  repeat EGD planned in October to check healing.  Patient's hgb hit loco 6.3 9/1 and since then in the 7s-8s. 7.1 this admission.   - Pantoprazole 40 mg BID     - patient consented for blood transfusion, type and screen ordered.   - if AM hgb acute drop consider luminal GI consult     # extra hepatic and intrahepatic biliary dilation c/f hemobilia   # Intra ductal papillary mucinous neoplasm   ERCP 8/11 showed hemobilia.   - Monitor LFTs     #  CKD G3   Patient has been variable GFRs 30-50s in the last few months. Most recent Cr trend 1.5-1.9 1.56 9/8/22.   - CTM     # Protein-calorie malnutrition  # severe gastroparesis s/p  GJ tube placement 7/27/2022  - RD nutrition consult placed for TF  - Percutaneous j tube in place with G venting to gravity     #HTN  # HFpEF   # A flutter with RVR/SVT   Echo 8/15/22 - EF 60-65% with trace mitral insufficiency. Has recently completed zio patch. Admission pro BNP elevated to 30K.   - Continue PTA metoprolol 50 mg BID          Diet: Adult Formula Drip Feeding: Continuous Novasource Renal; Jejunostomy; Goal  "Rate: 35 ml/hr--okay to resume at goal; mL/hr; Medication - Feeding Tube Flush Frequency: At least 15-30 mL water before and after medication administration and with tube c...  NPO per Anesthesia Guidelines for Procedure/Surgery Except for: Meds    DVT Prophylaxis: Pneumatic Compression Devices  Dong Catheter: Not present  Central Lines: None  Cardiac Monitoring: None  Code Status: Full Code      Disposition Plan      Expected Discharge Date: 09/15/2022        Discharge Comments: Bronch 9/13 and currently IV diuresing        The patient's care was discussed with the Bedside Nurse, Patient and Pumonary transplant Team.    Nathen Diggs MD  Hospitalist Service, GOLD TEAM 10  [facility]  Securely message with the Vocera Web Console (learn more here)  Text page via [facility] Paging/Directory   Please see signed in provider for up to date coverage information      Clinically Significant Risk Factors Present on Admission                # Overweight: Estimated body mass index is 27.76 kg/m  as calculated from the following:    Height as of 9/1/22: 1.6 m (5' 3\").    Weight as of this encounter: 71.1 kg (156 lb 11.2 oz).        ______________________________________________________________________    Interval History   Slept ok overnight.  Breathing much better today.  Oxygen need stable (around 1.5-2L).  Feeling hungry.  Having copious diarrhea.  Decreased fluid in legs.    No fevers or chills.     Data reviewed today: I reviewed all medications, new labs and imaging results over the last 24 hours.    Physical Exam   Vital Signs: Temp: 98.6  F (37  C) Temp src: Oral BP: 104/88 Pulse: 75   Resp: 24 SpO2: 99 % O2 Device: Nasal cannula Oxygen Delivery: 2 LPM  Weight: 156 lbs 11.2 oz     Constitutional: awake, alert, cooperative, no respiratory distress (improved from yesterday)  Respiratory: Some increased work of breathing, good air exchange, diffuse crackles only faint now.  No " wheezes.  Cardiovascular: Regular rate and rhythm, normal S1 and S2, and 2+ systolic murmur   GI: Normal bowel sounds, soft, non-distended, non-tender  Neuropsychiatric: General: calm and normal eye contact  Affect: normal and pleasant    Data   Recent Results (from the past 24 hour(s))   XR Chest Port 1 View    Narrative    Exam: XR CHEST PORT 1 VIEW, 9/12/2022 12:58 PM    Comparison: CT chest 9/8/2022, x-ray chest 9/7/2022.    History: Interval f/u    Findings:  Single AP semiupright view of the chest. Postsurgical changes of  bilateral lung transplant with intact clam shell sternotomy wires.    Trachea is midline. Stable cardiomediastinal silhouette. Perihilar and  bibasilar mixed opacities. No pneumothorax. Unchanged bilateral  loculated pleural effusions. The upper abdomen is unremarkable.      Impression    Impression:   1. Unchanged diffuse mixed opacities likely represent continued  pulmonary edema and atelectasis.  2. Unchanged bilateral loculated pleural effusions.    I have personally reviewed the examination and initial interpretation  and I agree with the findings.    CECI REGAN,          SYSTEM ID:  [id number]

## 2022-09-13 NOTE — PROGRESS NOTES
D/sleeping, awoke briefly for cares, TF continue   I/no insulin given for 4 am BS as she is NPO and only gets TF for intake, and they will be off for next 6-8 hours.  P/NPO for bronch in am, TF off at 0300

## 2022-09-13 NOTE — PROCEDURES
INTERVENTIONAL PULMONOLOGY       Procedure(s):    A flexible bronchoscopy  Airway exam  BAL  Therapeutic suctioning (1 sites)  Thoracentesis (left chest)  Tube thoracostomy (right chest)    Indication:  Bilateral effusion and PNA    Attending of Record:  Jose R Mccullough MD     Interventional Pulmonary Fellow   None    Trainees Present:   Kenrick MARTINEZ     Medications:    9 ml 4% lidocaine  9 ml 1% lidocaine  1 spray(s) hurricaine spray  1 mg versed  59 mcg fentanyl    Sedation Time:   Total sedation time was Choose Duration: 20 minutes of continuous bedside 1:1 monitoring.    Time Out:  Performed    The patient's medical record has been reviewed.  The indication for the procedure was reviewed.  The necessary history and physical examination was performed and reviewed.  The risks, benefits and alternatives of the procedure were discussed with the the patient in detail and she had the opportunity to ask questions.  I discussed in particular the potential complications including risks of minor or life-threatening bleeding and/or infection, respiratory failure, vocal cord trauma / paralysis, pneumothorax, and discomfort. Sedation risks were also discussed including abnormal heart rhythms, low blood pressure, and respiratory failure. All questions were answered to the best of my ability.  Verbal and written informed consent was obtained.  The proposed procedure and the patient's identification were verified prior to the procedure by the physician and the nurse, respiratory therapist, resident physician (resident / fellow).    The patient was assessed for the adequacy for the procedure and to receive medications.   Mental Status:  Alert and oriented x 3  Airway examination:  Class I (Complete visualization of soft palate)  Pulmonary:  Clear to ausculation bilaterally  CV:  RRR, no murmurs or gallops  ASA Grade:  (I)  Normal healthy patient    After clinical evaluation and reviewing the indication, risks, alternatives and  benefits of the procedure the patient was deemed to be in satisfactory condition to undergo the procedure.      A Tuberculosis risk assessment was performed:  The patient has no known RISK of Tuberculosis    The procedure was performed in a negative airflow room: Yes    Maneuvers / Procedure:      A Flexible  bronchoscope was used for the procedure. The flexible bronchoscope was inserted through the mouth observing the epiglottis and posterior pharyngeal structures. The VC were anesthetized with 1% and 4% lidocaine. The scope was then advanced throught the cords and into the trachea.      Airway Examination: A complete airway examination was performed from the distal trachea to the subsegmental level in each lobe of both lungs.  Pertinent findings include no endobronchial lesion. Bilateral anastomosis c/d/i. No granulation tissue.        BAL: The bronchoscope was wedged into the lingula bronchus. A total of 120cc of saline was instilled and a total of 40 was aspirated. This was sent for analysis.     Chest tube placement: Once the site was marked using US, A 14F Arrow chest tube  was used for the chest tube. The patient was prepped in sterile fashion. A total of 10 1%lidocaine used to anesthetize the area. A finder needle was used to aspirate fluid. The tube was then advanced into the pleural space using seldinger technique. The tube was connected to the pleura-VAC system and placed on suction. Fluid was sent to micro and cytology for analysis     Chest ultrasound: The right and left side was ultasounded and demonstrated pleural fluid. The diaphragm, heart and lung tissue were clearly visualized. Other findings include large right pleural effusion, small free flowing left pleural effusion and 7x6cm loculated pocket in left posterior space     Therapeutic suctioning: 15-20min of operative time was spent clearing out the airway of debris, blood and mucous prior to the intervention.     Thoracentesis: Once the site was  marked using US, A 21G FNA needle was used to aspirate fluid. The patient's left chest was prepped in sterile fashion. A total of 10 cc 1%lidocaine was used to anesthetize the area. A finder needle was used to aspirate fluid. The tube was then advanced into the pleural space while aspirating pleural fluid. The fluid was serosanguinous, cloudy in color/consistency. A total of 15cc fluid was removed. Fluid was sent to micro     Any disposable equipment was visually inspected and deemed to be intact immediately post procedure.      Relevant Pictures      Recommendations:     -->  Successful flex bronch, airway exam, BAL, left thoracentesis and right chest tube placement   -->  Right chest tube to waterseal. Clamp if >2L in next 4hrs. Daily CXR.   -->  Chest tube not placed on left side due to very small free flowing fluid. The posterior pocket of fluid was seen and fluid aspirated for culture   -->  Post-op CXR       Jose R Mccullough MD  Associate Professor of Medicine  Section of Interventional Pulmonology   Division of Pulmonary, Allergy, Critical Care and Sleep Medicine   [facility]  Pager: [phone]   Office: [phone]  Email: [email or web address]    Soledad LAROSE, RN  Interventional Pulmonary Care Coordinator  Office: [phone]  Email: [email or web address]    Jeny Jimenes  Interventional Pulmonology Surgery Scheduler  Office: [phone]  Email: [email or web address]

## 2022-09-13 NOTE — PROGRESS NOTES
Occupational Therapy: Defer - Orders received and acknowledged. Chart reviewed and discussed with care team.?IP Occupational Therapy not indicated.?Per discussion with PT, patient is completing ADLs IND in room and appropriate for single rehab discipline to follow. Defer discharge recommendations to PT and medical team.?Will complete orders. Please re-order OT if  further needs arise

## 2022-09-13 NOTE — PROGRESS NOTES
Pulmonary Medicine  Cystic Fibrosis - Lung Transplant Team  Progress Note  2022       Patient: Sofie Rodriguez  MRN: [id number]  : 1960 (age 62 year old)  Transplant: 2022 (Lung), POD#77  Admission date: 2022    Assessment & Plan:     Sofie Rodriguez is a 62-year-old female s/p recent BSLT (22) for COPD complicated by persistent bilateral pleural effusions, persistent CO2 retention, right hemidiaphragm palsy, left radial artery thrombosis (2/2 arterial line) s/p thrombectomy, BACILIO, C. diff colitis, gastroparesis s/p GJ tube placement (22), GI bleed 2/2 pyloric ulcer, hemobilia s/p ERCP and MRCP (2022), and persistent vasoplegia.  Pre-transplant history also significant for HTN, HFpEF, NTM colonization, hepatitis C, and previous methamphetamine use.  Patient admitted 22 with persistent dyspnea (increased with activity), daily morning hemoptysis, and increased BLE edema.  Also noted to have persistent, increased bilateral pleural effusions, diffuse bilateral groundglass changes, and more focal appearing LLL infiltrates on imaging.  Treated with aggressive diuresis with some improvement in symptoms and hypoxia.     Today's recommendations:  - Agree with continued diuresis  - IS and Aerobika q1h w/a  - Bronchoscopy with BAL this morning with Dr. Mccullough  - Pigtail chest tube placement with Dr. Mccullough on the right, aspiration on the left (cultures sent)   - Repeat CXR 2- view tomorrow and daily while chest tube remains (ordered)  - Lidoderm and Icy Hot patch to area near chest tube site (alternating, ordered)  - Tacro level today therapeutic, no dose adjustment, repeat level  (ordered)  - Prednisone taper next due 9/15 (ordered)  - Bactrim for PJP ppx resumed today qMWF  - VGCV for CMV ppx through   - Loperamide 2 mg TID with goal to titrate to 3 stools daily     Acute hypoxia:   Pulmonary edema:  S/p bilateral sequential lung transplant (BSLT) for end stage COPD:  Persistent  bibasilar pleural effusions:   Right hemidiaphragm palsy:  Admitted with with increased shortness of breath, dyspnea with minimal exertion and small volume daily hemoptysis.  Also with marked decline in PFTs (FEV1 1.22L, 50% on 8/18 to 0.98L, 40% on 9/7).  Chest CT (9/8) with increased effusions and groundglass changes.  DSA positive but stable (as below).  BNP markedly elevated (30k) and also with increased BLE edema.  Etiology unclear and is likely multi-factorial with DDx including pulmonary edema from hypervolemia, rejection (ACR +/- AMR), alveolar hemorrhage, and/or infection.  Aggressively diuresed with some improvement in symptoms, small volume morning hemoptysis persists.  CXR (9/12) with stable diffuse mixed opacities and small bibasilar effusions (right effusion previously noted to be more posterior per CT  film from 9/8, less visible on 1-view CXR).  - Agree with continued diuresis  - Supplemental O2 to keep >92%  - IS and Aerobika q1h w/a  - Bronchoscopy with lavage and cultures this morning with Dr. Mccullough  - Pigtail chest tube placement with Dr. Mccullough on the right, aspiration on the left (cultures sent)   - Repeat CXR 2- view tomorrow and daily while chest tube remains (ordered)  - Lidoderm and Icy Hot patch to area near chest tube site (alternating, ordered)     Immunosuppression:  - Tacrolimus 3.5 mg BID.  Goal level 8-12.  Level today therapeutic at 10.7, no dose adjustment.  Repeat level 9/16 (ordered).  -  mg BID  - Prednisone 10 mg BID with next taper due 9/15 (ordered)     Prophylaxis:   - Bactrim for PJP ppx resumed today qMWF (previously stopped d/t hyperkalemia, dapsone stopped d/t anemia, Pentamidine neb not tolerated)  - VGCV for CMV ppx through 9/28, D+/R+, recent CMV negative 9/7     Positive DSA: Newly positive on 8/10 with DQB2 mfi 2155.  Most recent DSA on 9/7 with decreased DQB5 from prior (7033-->5744).  - Following monthly as OP     EBV viremia: Low level (1374 on 9/7), not  likely to be clinically significant.  - Following monthly as OP     Other relevant problems being managed by the primary team:     Diarrhea: Etiology unclear.  C diff negative on 9/10.  May be medication related (MMF), but unable to transition to Myfortic given NPO and reluctant to switch to azathioprine until etiology of dyspnea is clarified.  Loperamide 2 mg TID started 9/12 after 10 loose stools reported the day prior, then with no stool overnight 9/12.  Management per primary.     BACILIO: Patient required a therapeutic dose of tacrolimus.  Continue efforts at diuresis  Avoid other nephrotoxins.      We appreciate the excellent care provided by the [place] [id number] team.  Recommendations communicated via in person rounding and this note.  Will continue to follow along closely, please do not hesitate to call with any questions or concerns.     Patient discussed with Dr. Gong.    Catherine Johnson, DNP, APRN, CNP  Inpatient Nurse Practitioner  Pulmonary CF/Transplant     Subjective & Interval History:     Pt. on 2L NC overnight (SpO2 100%) with no acute change in pulmonary symptoms.  Still with intermittent episodes of streaking in sputum.  Infrequent cough.  Underwent bronch and chest tube placement this morning, minimal sedation given persistent chronic hypercapnia, stable post-procedure.  Marked improvement in loose stools since starting scheduled loperamide yesterday.    Review of Systems:     C: No fever, no chills, + decreased weight  INTEGUMENTARY/SKIN: No rash or obvious new lesions  ENT/MOUTH: No sore throat, no sinus pain, no nasal congestion or drainage  RESP: See interval history  CV: No chest pain, no palpitations  GI: No nausea, no vomiting, no reflux symptoms  : No dysuria  MUSCULOSKELETAL: No myalgias, no arthralgias  ENDOCRINE: Blood sugars intermittently elevated  NEURO: No headache, no numbness or tingling  PSYCHIATRIC: Mood stable    Physical Exam:     All notes, images, and labs from past 24 hours  (at minimum) were reviewed.    Vital signs:  Temp: 98.6  F (37  C) Temp src: Oral BP: 118/70 Pulse: 95   Resp: 16 SpO2: 100 % O2 Device: Nasal cannula Oxygen Delivery: 2 LPM   Weight: 71.1 kg (156 lb 11.2 oz)  I/O:     Intake/Output Summary (Last 24 hours) at 9/13/2022 0827  Last data filed at 9/13/2022 0301  Gross per 24 hour   Intake 1185 ml   Output 855 ml   Net 330 ml     Constitutional: Lying in bed, in no apparent distress.   HEENT: Eyes with pink conjunctivae, anicteric.  Oral mucosa moist without lesions.    PULM: Mildly diminished air flow bilaterally.  No crackles, no rhonchi, no wheezes.  Non-labored breathing on 1L NC.  CV: Normal S1 and S2.  RRR.  No murmur, gallop, or rub.  1-2+ BLE edema.   ABD: NABS, soft, nontender, nondistended.  PEG/J tube site not visualized.  MSK: Moves all extremities.  No apparent muscle wasting.   NEURO: Alert and oriented, conversant.   SKIN: Warm, dry.  No rash on limited exam.   PSYCH: Mood stable.     Lines, Drains, and Devices:  Peripheral IV 09/10/22 Anterior;Left Lower forearm (Active)   Site Assessment WDL 09/13/22 0100   Line Status Saline locked 09/12/22 0800   Dressing Intervention New dressing  09/10/22 0135   Phlebitis Scale 0-->no symptoms 09/12/22 0800   Infiltration Scale 0 09/12/22 0800   Number of days: 3     Data:     LABS    CMP:   Recent Labs   Lab 09/13/22  0809 09/13/22  0624 09/13/22  0426 09/12/22  2355 09/12/22  0924 09/12/22  0851 09/11/22  1805 09/11/22  1732 09/11/22  1430 09/11/22  1024 09/10/22  0814 09/10/22  0712 09/08/22  0737 09/07/22  0858   NA  --  143  --   --   --  144  --  144  --  145  --  144   < > 144   POTASSIUM  --  4.1  --   --   --  3.6  --  3.8  --  3.7  --  4.0   < > 3.8   CHLORIDE  --  93*  --   --   --  95*  --  94*  --  94*  --  95*   < > 93*   CO2  --  47*  --   --   --  46*  --  48*  --  49*  --  48*   < > 43*   ANIONGAP  --  3*  --   --   --  3*  --  2*  --  2*  --  1*   < > 8   * 145* 167* 156*   < > 162*   < >  178*   < > 165*   < > 184*   < > 87   BUN  --  74.6*  --   --   --  74.4*  --  76.0*  --  76.0*  --  76.5*   < > 90.3*   CR  --  1.38*  --   --   --  1.42*  --  1.53*  --  1.57*  --  1.45*   < > 1.90*   GFRESTIMATED  --  44*  --   --   --  42*  --  39*  --  37*  --  41*   < > 30*   ESTUARDO  --  9.2  --   --   --  9.2  --  9.2  --  9.3  --  9.1   < > 9.4   MAG  --   --   --   --   --   --   --   --   --   --   --  2.3  --  2.7*   PHOS  --   --   --   --   --   --   --  2.3*  --   --   --  3.1  --   --    PROTTOTAL  --   --   --   --   --   --   --   --   --   --   --  5.5*  --  6.0*   ALBUMIN  --   --   --   --   --   --   --  3.3*  --   --   --  3.1*  --  3.4*   BILITOTAL  --   --   --   --   --   --   --   --   --   --   --  0.2  --  0.2   ALKPHOS  --   --   --   --   --   --   --   --   --   --   --  88  --  98   AST  --   --   --   --   --   --   --   --   --   --   --  17  --  16   ALT  --   --   --   --   --   --   --   --   --   --   --  11  --  14    < > = values in this interval not displayed.     CBC:   Recent Labs   Lab 09/13/22  0624 09/12/22  0851 09/11/22  1024 09/10/22  0712   WBC 7.9 8.0 9.3 7.7   RBC 2.45* 2.52* 2.60* 2.38*   HGB 7.5* 7.6* 7.8* 7.2*   HCT 25.9* 26.8* 26.9* 25.1*   * 106* 104* 106*   MCH 30.6 30.2 30.0 30.3   MCHC 29.0* 28.4* 29.0* 28.7*   RDW 19.4* 19.6* 19.5* 19.5*    276 282 271       INR: No lab results found in last 7 days.    Glucose:   Recent Labs   Lab 09/13/22  0809 09/13/22  0624 09/13/22  0426 09/12/22  2355 09/12/22  2106 09/12/22  1734   * 145* 167* 156* 148* 176*       Blood Gas:   Recent Labs   Lab 09/13/22  0624 09/12/22  0851 09/11/22  1732   PHV 7.36 7.36 7.43   PCO2V 82* 81* 75*   PO2V 109* 45 29   HCO3V 47* 46* 50*   LINDY 17.9* 15.5* 21.8*   O2PER 30 2 2       Culture Data No results for input(s): CULT in the last 168 hours.    Virology Data:   Lab Results   Component Value Date    FLUAH1 Not Detected 06/29/2022    FLUAH3 Not Detected 06/29/2022     [id number] Not Detected 06/29/2022    IFLUB Not Detected 06/29/2022    RSVA Not Detected 06/29/2022    RSVB Not Detected 06/29/2022    PIV1 Not Detected 06/29/2022    PIV2 Not Detected 06/29/2022    PIV3 Not Detected 06/29/2022    HMPV Not Detected 06/29/2022       Historical CMV results (last 3 of prior testing):  Lab Results   Component Value Date    CMVQNT Not Detected 09/07/2022    CMVQNT Not Detected 09/01/2022    CMVQNT Not Detected 08/29/2022     No results found for: CMVLOG    Urine Studies    Recent Labs   Lab Test 08/01/22  0319 07/08/22  0831 07/05/22  1004   URINEPH 8.0* 5.5 5.5   NITRITE Negative Negative Negative   LEUKEST Negative Negative Negative   WBCU  --  1 5       Most Recent [place] Pulmonary Function Testing (FVC/FEV1 only)  FVC-Pre   Date Value Ref Range Status   09/07/2022 1.01 L    09/01/2022 1.07 L    08/24/2022 1.23 L    08/18/2022 1.33 L      FVC-%Pred-Pre   Date Value Ref Range Status   09/07/2022 33 %    09/01/2022 35 %    08/24/2022 40 %    08/18/2022 43 %      FEV1-Pre   Date Value Ref Range Status   09/07/2022 0.98 L    09/01/2022 1.02 L    08/24/2022 1.17 L    08/18/2022 1.22 L      FEV1-%Pred-Pre   Date Value Ref Range Status   09/07/2022 40 %    09/01/2022 42 %    08/24/2022 48 %    08/18/2022 50 %        IMAGING    Recent Results (from the past 48 hour(s))   XR Chest Port 1 View    Narrative    Exam: XR CHEST PORT 1 VIEW, 9/12/2022 12:58 PM    Comparison: CT chest 9/8/2022, x-ray chest 9/7/2022.    History: Interval f/u    Findings:  Single AP semiupright view of the chest. Postsurgical changes of  bilateral lung transplant with intact clam shell sternotomy wires.    Trachea is midline. Stable cardiomediastinal silhouette. Perihilar and  bibasilar mixed opacities. No pneumothorax. Unchanged bilateral  loculated pleural effusions. The upper abdomen is unremarkable.      Impression    Impression:   1. Unchanged diffuse mixed opacities likely represent continued  pulmonary edema and  atelectasis.  2. Unchanged bilateral loculated pleural effusions.    I have personally reviewed the examination and initial interpretation  and I agree with the findings.    CECI REGAN,          SYSTEM ID:  [id number]

## 2022-09-13 NOTE — PROGRESS NOTES
D/she continues on tube feeds and meds are per tube. She has been sipping on some water and sucks on ice chips. She voided once and no BM so far this shift.  I/reminded her of test and NPO  P/NPO for bronch tomorrow at 9 am, stop tube feeds at 0300

## 2022-09-13 NOTE — TELEPHONE ENCOUNTER
Follow-up with anemia management service:    Admitted 9/9/22 with dyspnea.     IV Iron has not been started.     Anemia Latest Ref Rng & Units 9/3/2022 9/7/2022 9/9/2022 9/10/2022 9/11/2022 9/12/2022 9/13/2022   Hemoglobin 11.7 - 15.7 g/dL 8.0(L) 7.7(L) 7.1(L) 7.2(L) 7.8(L) 7.6(L) 7.5(L)   TSAT 15 - 46 % - - - - - - -   Ferritin 11 - 328 ng/mL 343(H) - - - - - -           Follow-up call date: 9/21/22    Sara Azul RN   Anemia Services  [address] [address]   [email or web address]   Office : [phone]  Fax: [phone]

## 2022-09-13 NOTE — PROGRESS NOTES
[facility]    Medicine Progress Note - Hospitalist Service, GOLD TEAM 10    Date of Admission:  9/9/2022    Assessment & Plan        Sofie Rodriguez is a 62 year old female with pertinent hx of end stage COPD s/p bilateral sequential lung transplant (6/22) on triple IS ( MMF/Tacro/pred), pyloric ulcer, recent ERCP c/f hemobilia, gastroparesis s/p GJ tube placement and Percutaneous J tube presents for a planned admission from transplant pulmonology to work up antibody medicated rejection versus infection.     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   Recent imaging - 9/8/22 CT chest - R>L pleural effusions with bilateral hazy                              nodular opacities and paraseptal thickening.   PFT - FEV1 40%, FVC - 33% FEV1/FVC 79%  Immune suppression - Tacrolimus 3.5 mg BID Goal level 8-12,  mg                                         BID ( decreased dose in the setting of GI symptoms),                                        prednisone taper currently on 10 mg BID until 9/15/22.  CMV ppx - Valgancyclovir 450 mg TID ( ending 9/28)   Oral candida ppx - Nystatin   Continue diuresis   Pain - PTA PRN oxycodone ordered   -S/p right pigtail chest tube placement on 9/13, left thoracentesis on 9/13   Plan:  >Daily CXR and repeat 2 view CXR in AM  >Lidoderm patch for chest tube    #Acute on Chronic HFpEF  Echo 8/15/22 - EF 60-65% with trace mitral insufficiency, admission BNP > 30K   Plan:  IV lasix, PO acetazolamide    # Diarrhea, Loose stools  # hx of C  diff   Patient is on MMF with chronic loose stools at baseline. Notes reflect recent dose reduction of MMF for this.  -C diff (negtaive on 9/10)  Plan:  >No stools over past 24 hours, will decrease immodium to prn    # Pyloric ulcer EGD 8/3/2022  # Acute on chronic macrocytic anemia    hx of coffee ground emesis. Patient had a EGD 8/3 which showed a Clear base ulcer in the pyloric channel identified as the culprit lesion. At the time was also noted to have excess gastric accumulation 2/2 pyloric inflammation. Vent G tube placed to allow for drainage. Vented to gravity now.  repeat EGD planned in October 2022 to check healing.  Patient's hgb hit loco 6.3 9/1 and since then in the 7s-8s. 7.1 this admission.   Plan:  - Pantoprazole 40 mg BID     - patient consented for blood transfusion, type and screen ordered.     # extra hepatic and intrahepatic biliary dilation c/f hemobilia   # Intra ductal papillary mucinous neoplasm   ERCP 8/11 showed hemobilia.   - Monitor LFTs     #CKD stage III   Patient has been variable GFRs 30-50s in the last few months. Most recent Cr trend 1.5-1.9 1.56 9/8/22.   - CTM     #Severe gastroparesis s/p  GJ tube placement 7/27/2022  - RD nutrition consult placed for TF  - Percutaneous j tube in place with G venting to gravity     #HTN  # A flutter with RVR/SVT   . Has recently completed zio patch.  - Continue PTA metoprolol 50 mg BID          Diet: Adult Formula Drip Feeding: Continuous Novasource Renal; Jejunostomy; Goal Rate: 35 ml/hr--okay to resume at goal; mL/hr; Medication - Feeding Tube Flush Frequency: At least 15-30 mL water before and after medication administration and with tube c...  NPO per Anesthesia Guidelines for Procedure/Surgery Except for: Meds    DVT Prophylaxis: Pneumatic Compression Devices  Dong Catheter: Not present  Central Lines: None  Cardiac Monitoring: None  Code Status: Full Code      Disposition Plan      Expected Discharge Date: 09/15/2022        Discharge  "Comments: bronch tuesday or wednesday        The patient's care was discussed with the Bedside Nurse, [facility].    Chidi Fay MD  Hospitalist Service, GOLD TEAM 10  [facility]  Securely message with the Vocera Web Console (learn more here)  Text page via [facility] Paging/Directory   Please see signed in provider for up to date coverage information      Clinically Significant Risk Factors Present on Admission                # Overweight: Estimated body mass index is 27.76 kg/m  as calculated from the following:    Height as of 9/1/22: 1.6 m (5' 3\").    Weight as of this encounter: 71.1 kg (156 lb 11.2 oz).        ______________________________________________________________________    Interval History   Patient seen post chest tube placement. She reports pain near chest tube insertion site. States her diarrhea has improved. She has had no bowel movements overnight. No abdominal pain. No nausea or vomiting. No shortness of breath.     Data reviewed today: I reviewed all medications, new labs and imaging results over the last 24 hours.    Physical Exam   Vital Signs: Temp: 98.6  F (37  C) Temp src: Oral BP: 139/77 Pulse: 97   Resp: 16 SpO2: 100 % O2 Device: Nasal cannula Oxygen Delivery: 2 LPM  Weight: 156 lbs 11.2 oz     Constitutional: awake, tired after procedure, somnolent but awakens to voice  Respiratory: crackles in lower posterior lung fields bilaterally , right chest tube to atrium and wall suction  Cardiovascular: Regular rate and rhythm, normal S1 and S2, systolic murmur 2/6  GI: Normal bowel sounds, soft, non-distended, non-tender, GJ placement  Ext: 1+ pitting edema  Neuropsychiatric: moving all extremities, no focal deficit, holding eye contact  Data   Recent Results (from the past 24 hour(s))   XR Chest Port 1 View    Narrative    Exam: XR CHEST PORT 1 VIEW, 9/12/2022 12:58 PM    Comparison: CT chest 9/8/2022, x-ray chest " 9/7/2022.    History: Interval f/u    Findings:  Single AP semiupright view of the chest. Postsurgical changes of  bilateral lung transplant with intact clam shell sternotomy wires.    Trachea is midline. Stable cardiomediastinal silhouette. Perihilar and  bibasilar mixed opacities. No pneumothorax. Unchanged bilateral  loculated pleural effusions. The upper abdomen is unremarkable.      Impression    Impression:   1. Unchanged diffuse mixed opacities likely represent continued  pulmonary edema and atelectasis.  2. Unchanged bilateral loculated pleural effusions.    I have personally reviewed the examination and initial interpretation  and I agree with the findings.    CECI REGAN,          SYSTEM ID:  [id number]

## 2022-09-13 NOTE — PROGRESS NOTES
"   09/13/22 1400   Quick Adds   Type of Visit Initial PT Evaluation   Living Environment   People in Home child(ray), adult   Current Living Arrangements house   Home Accessibility stairs to enter home;stairs within home   Number of Stairs, Main Entrance 1   Stair Railings, Main Entrance none   Number of Stairs, Within Home, Primary nine   Stair Railings, Within Home, Primary railings safe and in good condition   Transportation Anticipated family or friend will provide   Living Environment Comments Per last admission - pt has lived with her adult son for the last 2 years, her home has mold in it which has not been appropraite for pt's lungs, per her report. Pt's son assists with household management/cleaning and makes some of the meals. Since last hospitalization, pt has been staying at [facility] with 24/7 assist, no stairs, w/c accessible. Plans to stay at [facility] until end of October.   Self-Care   Usual Activity Tolerance moderate   Current Activity Tolerance fair   Regular Exercise No   Equipment Currently Used at Home shower chair;walker, rolling;wheelchair, manual  (4WW)   Fall history within last six months no   General Information   Onset of Illness/Injury or Date of Surgery 09/09/22   Referring Physician Nathen Diggs MD   Patient/Family Therapy Goals Statement (PT) to return home   Pertinent History of Current Problem (include personal factors and/or comorbidities that impact the POC) Per EMR: \"62-year-old female s/p recent BSLT (6/28/22) for COPD complicated by persistent bilateral pleural effusions, persistent CO2 retention, right hemidiaphragm palsy, left radial artery thrombosis (2/2 arterial line) s/p thrombectomy, BACILIO, C. diff colitis, gastroparesis s/p GJ tube placement (7/27/22), GI bleed 2/2 pyloric ulcer, hemobilia s/p ERCP and MRCP (August 2022), and persistent vasoplegia.  Pre-transplant history also significant for HTN, HFpEF, NTM colonization, hepatitis C, and previous methamphetamine use.  " "Patient admitted 9/9/22 with persistent dyspnea (increased with activity), daily morning hemoptysis, and increased BLE edema.  Also noted to have persistent, increased bilateral pleural effusions, diffuse bilateral groundglass changes, and more focal appearing LLL infiltrates on imaging.  Treated with aggressive diuresis with some improvement in symptoms and hypoxia\"   Existing Precautions/Restrictions sternal  (clamshell - 12 weeks s/p 9/20)   Cognition   Affect/Mental Status (Cognition) WNL   Orientation Status (Cognition) oriented x 4   Follows Commands (Cognition) WNL   Pain Assessment   Patient Currently in Pain Yes, see Vital Sign flowsheet   Integumentary/Edema   Integumentary/Edema other (describe)   Posture    Posture Forward head position   Range of Motion (ROM)   Range of Motion ROM deficits secondary to surgical procedure   Strength (Manual Muscle Testing)   Strength (Manual Muscle Testing) strength is WFL   Strength Comments bilat HF 3+/5, KE 4/5, DF 3+/5   Bed Mobility   Comment, (Bed Mobility) not observed though pt reports IND   Transfers   Comment, (Transfers) sit <> stand SBA   Gait/Stairs (Locomotion)   [place] Level (Gait) supervision   Assistive Device (Gait) walker, front-wheeled   Distance in Feet (Required for LE Total Joints) 40ft   Pattern (Gait) swing-through   Deviations/Abnormal Patterns (Gait) stride length decreased;gait speed decreased   Comment, (Gait/Stairs) SBA for amb with FWW, tolerated 40ft 2/2 pain, demos short step length and decreased gait speed, steady throughout   Balance   Balance Comments seated balance good, standing balance requires FWW and SBA for dynamic movement   Sensory Examination   Sensory Perception patient reports no sensory changes   Coordination   Coordination no deficits were identified   Muscle Tone   Muscle Tone no deficits were identified   Clinical Impression   Criteria for Skilled Therapeutic Intervention Yes, treatment indicated   PT Diagnosis " (PT) impaired functional mobility   Influenced by the following impairments decreased strength, endurance, and balance   Functional limitations due to impairments difficulty with ambulation and stairs   Clinical Presentation (PT Evaluation Complexity) Stable/Uncomplicated   Clinical Presentation Rationale clinical judgement   Clinical Decision Making (Complexity) low complexity   Planned Therapy Interventions (PT) balance training;bed mobility training;gait training;home exercise program;patient/family education;stair training;ROM (range of motion);strengthening;transfer training;progressive activity/exercise;risk factor education;home program guidelines   Anticipated Equipment Needs at Discharge (PT)   (TBD)   Risk & Benefits of therapy have been explained evaluation/treatment results reviewed;care plan/treatment goals reviewed;risks/benefits reviewed;current/potential barriers reviewed;patient   PT Discharge Planning   PT Discharge Recommendation (DC Rec) home with assist;home with outpatient pulmonary rehab;home with outpatient physical therapy   PT Rationale for DC Rec Pt mobilizing just below baseline of SBA-mod I, at this time is limited primarily by pain s/p chest tube placement. Anticipate at time of d/c pt will be safe to d/c to [facility] with continued 24/7 assist and outpatient AL. Pt would benefit from OP AL to continue to address activity tolerance and strength defecits   PT Brief overview of current status Ambulate with FWW, SBA, assist for lines. SBA for transfers.   Plan of Care Review   Plan of Care Reviewed With patient   Total Evaluation Time   Total Evaluation Time (Minutes) 8   Physical Therapy Goals   PT Frequency 6x/week   PT Predicted Duration/Target Date for Goal Attainment 09/30/22   PT Goals Bed Mobility;Transfers;Gait   PT: Bed Mobility Independent   PT: Transfers Modified independent;Sit to/from stand;Bed to/from chair   PT: Gait Modified independent;Greater than 200 feet  (LRAD)

## 2022-09-13 NOTE — TELEPHONE ENCOUNTER
Daughter Charity calls to request an update on her mother.    Updated daughter that patient had a bronchoscopy today and results will determine plan.  Reached out to inpatient coordinator and Dr. Gong to call Charity to discuss plan for mom.  Charity reports that she is feeling a bit scared as she was told that her mom is in rejection.  Listened to daughter's concerns about situation, and reassured daughter that inpatient team will reach out with the plan.

## 2022-09-13 NOTE — PLAN OF CARE
D: Admitted 9/9 with persistent dyspnea, morning hemoptysis, and increased BLE edema.   Hx: BSLT (6/28/22) for COPD complicated by persistent bilateral pleural effusions, persistent CO2 retention, right hemidiaphragm palsy, left radial artery thrombosis (2/2 arterial line) s/p thrombectomy, BACILIO, C. diff colitis, gastroparesis s/p GJ tube placement (7/27/22), GI bleed 2/2 pyloric ulcer, hemobilia s/p ERCP and MRCP (August 2022), and persistent vasoplegia.  Pre-transplant history also significant for HTN, HFpEF, NTM colonization, hepatitis C, and previous methamphetamine use    I: Monitored vitals and assessed pt status.   Changed: R CT placed  Running: TF@35mL/hr (FWF 30mL Q4hrs)  PRN: Oxycodone 5mg for chest tube insertion pain    A: A0x4. VSS, on 1-2L via NC. SR. Afebrile. Pain controlled with scheduled tylenol and PRN oxycodone. LBM 9/13, loose. Voiding via commode. SBA to commode and to chair. NPO except ice chips. Chest tube dressing in place. R chest tube up to suction (output:~390mL), intermittent airleak, team aware, chest tube should remain up to suction. Blood sugars Q4hrs. G tube to gravity, J to tube feeds and for medications.    Bronch completed, pt NPO before bronch, TF restarted after pt arrived to unit after bronch. R CT placed and thoracentesis completed on the left.    P: Report changes to [facility] team.

## 2022-09-14 ENCOUNTER — APPOINTMENT (OUTPATIENT)
Dept: GENERAL RADIOLOGY | Facility: CLINIC | Age: 62
DRG: 205 | End: 2022-09-14
Attending: PHYSICIAN ASSISTANT
Payer: MEDICARE

## 2022-09-14 ENCOUNTER — APPOINTMENT (OUTPATIENT)
Dept: PHYSICAL THERAPY | Facility: CLINIC | Age: 62
DRG: 205 | End: 2022-09-14
Attending: INTERNAL MEDICINE
Payer: MEDICARE

## 2022-09-14 ENCOUNTER — APPOINTMENT (OUTPATIENT)
Dept: GENERAL RADIOLOGY | Facility: CLINIC | Age: 62
DRG: 205 | End: 2022-09-14
Attending: NURSE PRACTITIONER
Payer: MEDICARE

## 2022-09-14 ENCOUNTER — HOSPITAL ENCOUNTER (INPATIENT)
Facility: CLINIC | Age: 62
End: 2022-09-14
Attending: INTERNAL MEDICINE | Admitting: INTERNAL MEDICINE
Payer: MEDICARE

## 2022-09-14 LAB
ACID FAST STAIN (ARUP): NORMAL
ADENOSINE DEAMINASE PLR-CCNC: 4 U/L
ANION GAP SERPL CALCULATED.3IONS-SCNC: 4 MMOL/L (ref 7–15)
BASE EXCESS BLDV CALC-SCNC: 18.7 MMOL/L (ref -7.7–1.9)
BUN SERPL-MCNC: 84.5 MG/DL (ref 8–23)
CALCIUM SERPL-MCNC: 9.1 MG/DL (ref 8.8–10.2)
CHLORIDE SERPL-SCNC: 92 MMOL/L (ref 98–107)
CMV DNA SPEC NAA+PROBE-ACNC: NOT DETECTED IU/ML
CREAT SERPL-MCNC: 1.57 MG/DL (ref 0.51–0.95)
DEPRECATED HCO3 PLAS-SCNC: 45 MMOL/L (ref 22–29)
ERYTHROCYTE [DISTWIDTH] IN BLOOD BY AUTOMATED COUNT: 19.4 % (ref 10–15)
GFR SERPL CREATININE-BSD FRML MDRD: 37 ML/MIN/1.73M2
GLUCOSE BLDC GLUCOMTR-MCNC: 168 MG/DL (ref 70–99)
GLUCOSE BLDC GLUCOMTR-MCNC: 175 MG/DL (ref 70–99)
GLUCOSE BLDC GLUCOMTR-MCNC: 200 MG/DL (ref 70–99)
GLUCOSE BLDC GLUCOMTR-MCNC: 211 MG/DL (ref 70–99)
GLUCOSE BLDC GLUCOMTR-MCNC: 226 MG/DL (ref 70–99)
GLUCOSE SERPL-MCNC: 219 MG/DL (ref 70–99)
HCO3 BLDV-SCNC: 45 MMOL/L (ref 21–28)
HCT VFR BLD AUTO: 27 % (ref 35–47)
HGB BLD-MCNC: 7.8 G/DL (ref 11.7–15.7)
MAGNESIUM SERPL-MCNC: 2.3 MG/DL (ref 1.7–2.3)
MCH RBC QN AUTO: 31 PG (ref 26.5–33)
MCHC RBC AUTO-ENTMCNC: 28.9 G/DL (ref 31.5–36.5)
MCV RBC AUTO: 107 FL (ref 78–100)
METHYLMALONATE SERPL-SCNC: 0.61 UMOL/L (ref 0–0.4)
O2/TOTAL GAS SETTING VFR VENT: 20 %
PATH REPORT.COMMENTS IMP SPEC: NORMAL
PATH REPORT.FINAL DX SPEC: NORMAL
PATH REPORT.FINAL DX SPEC: NORMAL
PATH REPORT.GROSS SPEC: NORMAL
PATH REPORT.MICROSCOPIC SPEC OTHER STN: NORMAL
PATH REPORT.MICROSCOPIC SPEC OTHER STN: NORMAL
PATH REPORT.RELEVANT HX SPEC: NORMAL
PATH REPORT.RELEVANT HX SPEC: NORMAL
PCO2 BLDV: 77 MM HG (ref 40–50)
PH BLDV: 7.38 [PH] (ref 7.32–7.43)
PHOSPHATE SERPL-MCNC: 3.5 MG/DL (ref 2.5–4.5)
PLATELET # BLD AUTO: 265 10E3/UL (ref 150–450)
PO2 BLDV: 58 MM HG (ref 25–47)
POTASSIUM SERPL-SCNC: 4.1 MMOL/L (ref 3.4–5.3)
POTASSIUM SERPL-SCNC: 5.7 MMOL/L (ref 3.4–5.3)
RBC # BLD AUTO: 2.52 10E6/UL (ref 3.8–5.2)
SODIUM SERPL-SCNC: 141 MMOL/L (ref 136–145)
WBC # BLD AUTO: 7.4 10E3/UL (ref 4–11)

## 2022-09-14 PROCEDURE — 97116 GAIT TRAINING THERAPY: CPT | Mod: GP

## 2022-09-14 PROCEDURE — 88112 CYTOPATH CELL ENHANCE TECH: CPT | Mod: 26 | Performed by: PATHOLOGY

## 2022-09-14 PROCEDURE — 97530 THERAPEUTIC ACTIVITIES: CPT | Mod: GP

## 2022-09-14 PROCEDURE — 71046 X-RAY EXAM CHEST 2 VIEWS: CPT

## 2022-09-14 PROCEDURE — 36415 COLL VENOUS BLD VENIPUNCTURE: CPT | Performed by: INTERNAL MEDICINE

## 2022-09-14 PROCEDURE — 86833 HLA CLASS II HIGH DEFIN QUAL: CPT | Performed by: NURSE PRACTITIONER

## 2022-09-14 PROCEDURE — 74019 RADEX ABDOMEN 2 VIEWS: CPT

## 2022-09-14 PROCEDURE — 250N000011 HC RX IP 250 OP 636: Performed by: STUDENT IN AN ORGANIZED HEALTH CARE EDUCATION/TRAINING PROGRAM

## 2022-09-14 PROCEDURE — 250N000011 HC RX IP 250 OP 636: Performed by: INTERNAL MEDICINE

## 2022-09-14 PROCEDURE — 36415 COLL VENOUS BLD VENIPUNCTURE: CPT | Performed by: NURSE PRACTITIONER

## 2022-09-14 PROCEDURE — 250N000013 HC RX MED GY IP 250 OP 250 PS 637

## 2022-09-14 PROCEDURE — 214N000001 HC R&B CCU UMMC

## 2022-09-14 PROCEDURE — 88108 CYTOPATH CONCENTRATE TECH: CPT | Mod: 26 | Performed by: PATHOLOGY

## 2022-09-14 PROCEDURE — 250N000013 HC RX MED GY IP 250 OP 250 PS 637: Performed by: NURSE PRACTITIONER

## 2022-09-14 PROCEDURE — 36415 COLL VENOUS BLD VENIPUNCTURE: CPT | Performed by: STUDENT IN AN ORGANIZED HEALTH CARE EDUCATION/TRAINING PROGRAM

## 2022-09-14 PROCEDURE — 250N000009 HC RX 250

## 2022-09-14 PROCEDURE — 85027 COMPLETE CBC AUTOMATED: CPT | Performed by: INTERNAL MEDICINE

## 2022-09-14 PROCEDURE — 88305 TISSUE EXAM BY PATHOLOGIST: CPT | Mod: 26 | Performed by: PATHOLOGY

## 2022-09-14 PROCEDURE — 84132 ASSAY OF SERUM POTASSIUM: CPT | Performed by: STUDENT IN AN ORGANIZED HEALTH CARE EDUCATION/TRAINING PROGRAM

## 2022-09-14 PROCEDURE — 93010 ELECTROCARDIOGRAM REPORT: CPT | Performed by: INTERNAL MEDICINE

## 2022-09-14 PROCEDURE — 86832 HLA CLASS I HIGH DEFIN QUAL: CPT | Performed by: NURSE PRACTITIONER

## 2022-09-14 PROCEDURE — 250N000013 HC RX MED GY IP 250 OP 250 PS 637: Performed by: INTERNAL MEDICINE

## 2022-09-14 PROCEDURE — 74019 RADEX ABDOMEN 2 VIEWS: CPT | Mod: 26 | Performed by: RADIOLOGY

## 2022-09-14 PROCEDURE — 83735 ASSAY OF MAGNESIUM: CPT | Performed by: STUDENT IN AN ORGANIZED HEALTH CARE EDUCATION/TRAINING PROGRAM

## 2022-09-14 PROCEDURE — 93005 ELECTROCARDIOGRAM TRACING: CPT

## 2022-09-14 PROCEDURE — 80048 BASIC METABOLIC PNL TOTAL CA: CPT | Performed by: INTERNAL MEDICINE

## 2022-09-14 PROCEDURE — 99207 PR NON-BILLABLE SERV PER CHARTING: CPT | Performed by: NURSE PRACTITIONER

## 2022-09-14 PROCEDURE — 250N000012 HC RX MED GY IP 250 OP 636 PS 637: Performed by: PHYSICIAN ASSISTANT

## 2022-09-14 PROCEDURE — 250N000012 HC RX MED GY IP 250 OP 636 PS 637: Performed by: INTERNAL MEDICINE

## 2022-09-14 PROCEDURE — 250N000012 HC RX MED GY IP 250 OP 636 PS 637

## 2022-09-14 PROCEDURE — 250N000013 HC RX MED GY IP 250 OP 250 PS 637: Performed by: STUDENT IN AN ORGANIZED HEALTH CARE EDUCATION/TRAINING PROGRAM

## 2022-09-14 PROCEDURE — 84100 ASSAY OF PHOSPHORUS: CPT | Performed by: INTERNAL MEDICINE

## 2022-09-14 PROCEDURE — 99233 SBSQ HOSP IP/OBS HIGH 50: CPT | Mod: FS | Performed by: NURSE PRACTITIONER

## 2022-09-14 PROCEDURE — 99233 SBSQ HOSP IP/OBS HIGH 50: CPT | Performed by: STUDENT IN AN ORGANIZED HEALTH CARE EDUCATION/TRAINING PROGRAM

## 2022-09-14 PROCEDURE — 71046 X-RAY EXAM CHEST 2 VIEWS: CPT | Mod: 26 | Performed by: RADIOLOGY

## 2022-09-14 PROCEDURE — 88312 SPECIAL STAINS GROUP 1: CPT | Mod: 26 | Performed by: PATHOLOGY

## 2022-09-14 PROCEDURE — 82803 BLOOD GASES ANY COMBINATION: CPT | Performed by: INTERNAL MEDICINE

## 2022-09-14 RX ORDER — CHLORTHALIDONE 25 MG/1
25 TABLET ORAL ONCE
Status: COMPLETED | OUTPATIENT
Start: 2022-09-14 | End: 2022-09-14

## 2022-09-14 RX ORDER — FUROSEMIDE 10 MG/ML
80 INJECTION INTRAMUSCULAR; INTRAVENOUS ONCE
Status: DISCONTINUED | OUTPATIENT
Start: 2022-09-14 | End: 2022-09-15

## 2022-09-14 RX ORDER — FUROSEMIDE 10 MG/ML
80 INJECTION INTRAMUSCULAR; INTRAVENOUS DAILY
Status: DISCONTINUED | OUTPATIENT
Start: 2022-09-15 | End: 2022-09-19

## 2022-09-14 RX ORDER — LOPERAMIDE HCL 2 MG
2 CAPSULE ORAL 3 TIMES DAILY PRN
Status: DISCONTINUED | OUTPATIENT
Start: 2022-09-14 | End: 2022-10-04

## 2022-09-14 RX ORDER — POTASSIUM CHLORIDE 20MEQ/15ML
40 LIQUID (ML) ORAL ONCE
Status: COMPLETED | OUTPATIENT
Start: 2022-09-14 | End: 2022-09-14

## 2022-09-14 RX ORDER — FUROSEMIDE 10 MG/ML
80 INJECTION INTRAMUSCULAR; INTRAVENOUS ONCE
Status: COMPLETED | OUTPATIENT
Start: 2022-09-14 | End: 2022-09-14

## 2022-09-14 RX ORDER — LIDOCAINE HYDROCHLORIDE 10 MG/ML
INJECTION, SOLUTION EPIDURAL; INFILTRATION; INTRACAUDAL; PERINEURAL
Status: DISCONTINUED
Start: 2022-09-14 | End: 2022-09-15 | Stop reason: HOSPADM

## 2022-09-14 RX ADMIN — Medication 1 CAPSULE: at 10:16

## 2022-09-14 RX ADMIN — MYCOPHENOLATE MOFETIL 750 MG: 200 POWDER, FOR SUSPENSION ORAL at 19:50

## 2022-09-14 RX ADMIN — VALGANCICLOVIR HYDROCHLORIDE 450 MG: 50 POWDER, FOR SOLUTION ORAL at 09:30

## 2022-09-14 RX ADMIN — ACETAZOLAMIDE 125 MG: 125 TABLET ORAL at 15:58

## 2022-09-14 RX ADMIN — Medication 1 PACKET: at 07:59

## 2022-09-14 RX ADMIN — INSULIN ASPART 1 UNITS: 100 INJECTION, SOLUTION INTRAVENOUS; SUBCUTANEOUS at 20:12

## 2022-09-14 RX ADMIN — TACROLIMUS 3.5 MG: 5 CAPSULE ORAL at 07:56

## 2022-09-14 RX ADMIN — OXYCODONE HYDROCHLORIDE 5 MG: 5 SOLUTION ORAL at 03:37

## 2022-09-14 RX ADMIN — Medication 40 MG: at 19:50

## 2022-09-14 RX ADMIN — TACROLIMUS 3.5 MG: 5 CAPSULE ORAL at 18:17

## 2022-09-14 RX ADMIN — CHLORTHALIDONE 25 MG: 25 TABLET ORAL at 14:42

## 2022-09-14 RX ADMIN — OXYCODONE HYDROCHLORIDE 5 MG: 5 SOLUTION ORAL at 10:16

## 2022-09-14 RX ADMIN — LIDOCAINE HYDROCHLORIDE 1 ML: 10 INJECTION, SOLUTION EPIDURAL; INFILTRATION; INTRACAUDAL; PERINEURAL at 15:28

## 2022-09-14 RX ADMIN — INSULIN ASPART 2 UNITS: 100 INJECTION, SOLUTION INTRAVENOUS; SUBCUTANEOUS at 03:34

## 2022-09-14 RX ADMIN — POTASSIUM CHLORIDE 40 MEQ: 40 SOLUTION ORAL at 09:30

## 2022-09-14 RX ADMIN — FUROSEMIDE 40 MG: 10 INJECTION, SOLUTION INTRAVENOUS at 07:55

## 2022-09-14 RX ADMIN — CALCIUM CARBONATE 600 MG (1,500 MG)-VITAMIN D3 400 UNIT TABLET 1 TABLET: at 07:56

## 2022-09-14 RX ADMIN — INSULIN ASPART 2 UNITS: 100 INJECTION, SOLUTION INTRAVENOUS; SUBCUTANEOUS at 16:00

## 2022-09-14 RX ADMIN — INSULIN ASPART 1 UNITS: 100 INJECTION, SOLUTION INTRAVENOUS; SUBCUTANEOUS at 12:18

## 2022-09-14 RX ADMIN — METOPROLOL TARTRATE 50 MG: 50 TABLET, FILM COATED ORAL at 07:56

## 2022-09-14 RX ADMIN — MENTHOL 1 PATCH: 205.5 PATCH TOPICAL at 08:18

## 2022-09-14 RX ADMIN — NYSTATIN 1000000 UNITS: 100000 SUSPENSION ORAL at 07:56

## 2022-09-14 RX ADMIN — NYSTATIN 1000000 UNITS: 100000 SUSPENSION ORAL at 12:46

## 2022-09-14 RX ADMIN — NYSTATIN 1000000 UNITS: 100000 SUSPENSION ORAL at 19:49

## 2022-09-14 RX ADMIN — FUROSEMIDE 80 MG: 10 INJECTION, SOLUTION INTRAVENOUS at 14:42

## 2022-09-14 RX ADMIN — SULFAMETHOXAZOLE AND TRIMETHOPRIM 80 MG: 200; 40 SUSPENSION ORAL at 07:56

## 2022-09-14 RX ADMIN — ACETAMINOPHEN 975 MG: 325 TABLET, FILM COATED ORAL at 19:49

## 2022-09-14 RX ADMIN — CALCIUM CARBONATE 600 MG (1,500 MG)-VITAMIN D3 400 UNIT TABLET 1 TABLET: at 18:17

## 2022-09-14 RX ADMIN — MYCOPHENOLATE MOFETIL 750 MG: 200 POWDER, FOR SUSPENSION ORAL at 07:56

## 2022-09-14 RX ADMIN — LOPERAMIDE HYDROCHLORIDE 2 MG: 2 CAPSULE ORAL at 19:56

## 2022-09-14 RX ADMIN — METOPROLOL TARTRATE 50 MG: 50 TABLET, FILM COATED ORAL at 19:49

## 2022-09-14 RX ADMIN — ACETAMINOPHEN 975 MG: 325 TABLET, FILM COATED ORAL at 14:41

## 2022-09-14 RX ADMIN — NYSTATIN 1000000 UNITS: 100000 SUSPENSION ORAL at 15:58

## 2022-09-14 RX ADMIN — INSULIN ASPART 2 UNITS: 100 INJECTION, SOLUTION INTRAVENOUS; SUBCUTANEOUS at 08:14

## 2022-09-14 RX ADMIN — Medication 40 MG: at 07:56

## 2022-09-14 RX ADMIN — ACETAMINOPHEN 975 MG: 325 TABLET, FILM COATED ORAL at 07:56

## 2022-09-14 RX ADMIN — ACETAZOLAMIDE 125 MG: 125 TABLET ORAL at 07:56

## 2022-09-14 RX ADMIN — PREDNISONE 10 MG: 10 TABLET ORAL at 19:50

## 2022-09-14 RX ADMIN — OXYCODONE HYDROCHLORIDE 5 MG: 5 SOLUTION ORAL at 19:56

## 2022-09-14 RX ADMIN — LOPERAMIDE HYDROCHLORIDE 2 MG: 2 CAPSULE ORAL at 10:16

## 2022-09-14 RX ADMIN — POTASSIUM CHLORIDE 40 MEQ: 40 SOLUTION ORAL at 15:58

## 2022-09-14 RX ADMIN — PREDNISONE 10 MG: 10 TABLET ORAL at 07:56

## 2022-09-14 ASSESSMENT — ACTIVITIES OF DAILY LIVING (ADL)
ADLS_ACUITY_SCORE: 28

## 2022-09-14 NOTE — PROGRESS NOTES
D/she now has Aqua K to right back for discomfort, icy hot patch was removed on days. CT has loose stitches and came out a bit and on days surgery restitched, now area is tender she says. TF continue. She gets up to bedside commode independently and up in reilly with assist of 1 and walker.  A/needs pleural effusion to continue to drain  D/3 watery stools tonight, wanted prn Imodium (already neg for c diff)-given and wanted oxycodone for bedtime-given  P/monitor for changes

## 2022-09-14 NOTE — PROGRESS NOTES
D: SOB and edema. Hx BSLT 6/28/22 d/t COPD.     I: Monitored vitals and assessed pt status.   Changed: Changed TF formula, increased to 40mL/hr. Order to notify team if CO2 is >82  Running: TF 40mL/hr tolerating well.  PRN: Imodium X1, Oxycodone 5mg X1.   Tele: SR  O2: 1L NC. Unable to wean  Mobility: SBA, pivot to commode, walker in hallways    A: A0x4. VSS. Afebrile. Urinating adequately. Right CT to -20 suction, ok to be on H2O seal to tests/ambulating in hallways. ~100mL out since 0700. Sutures came undone. Pulm team came to resuture chest tube.     Need to collect Enteric panel when pt has BM. Cdiff negative 9/10.    P: Continue to monitor Pt status and report changes to [facility].    Temp:  [97.3  F (36.3  C)-98.4  F (36.9  C)] 97.6  F (36.4  C)  Pulse:  [71-93] 72  Resp:  [16-20] 18  BP: (112-136)/(58-74) 112/62  Cuff Mean (mmHg):  [91] 91  SpO2:  [96 %-98 %] 96 %

## 2022-09-14 NOTE — PROGRESS NOTES
Pulmonary Medicine  Cystic Fibrosis - Lung Transplant Team  Progress Note  2022       Patient: Sofie Rodriguez  MRN: [id number]  : 1960 (age 62 year old)  Transplant: 2022 (Lung), POD#78  Admission date: 2022    Assessment & Plan:     Sofie Rodriguez is a 62-year-old female s/p recent BSLT (22) for COPD complicated by persistent bilateral pleural effusions, persistent CO2 retention, right hemidiaphragm palsy, left radial artery thrombosis (2/2 arterial line) s/p thrombectomy, BACILIO, C. diff colitis, gastroparesis s/p GJ tube placement (22), GI bleed 2/2 pyloric ulcer, hemobilia s/p ERCP and MRCP (2022), and persistent vasoplegia.  Pre-transplant history also significant for HTN, HFpEF, NTM colonization, hepatitis C, and previous methamphetamine use.  Patient admitted 22 with persistent dyspnea (increased with activity), daily morning hemoptysis, and increased BLE edema.  Also noted to have persistent, increased bilateral pleural effusions, diffuse bilateral groundglass changes, and more focal appearing LLL infiltrates on imaging.  Treated with aggressive diuresis with some improvement in symptoms and hypoxia.  Bronchoscopy () grossly normal, studies sent.  Also s/p pigtail chest tube for right pleural effusion and aspiration of left pleural effusion ().     Today's recommendations:  - Increase diuresis as able to address increased pulmonary edema on CXR today, persistent pleural effusions, and persistent hypoxia  - Following pending cultures and cytology, defer ABX at this time pending workup  - Considering high dose steroids tomorrow if pulmonary symptoms not improved with diuresis, defer today  - Right chest tube to suction, defer lytics in favor of diuresis given stable imaging, reevaluate daily  - IR consult for drainage of left pleural effusion if persisting despite aggressive diuresis, defer today  - Tacro level ordered   - Prednisone taper ordered  tomorrow  - VGCV for CMV ppx through 9/28  - DSA repeated STAT  - Goal to titrate to antidiarrheal 3 stools daily, still waiting for enteric panel to be sent (ordered 9/12)     S/p bilateral sequential lung transplant (BSLT) for end stage COPD:  Acute hypoxia with chronic hypercapnia:   Pulmonary edema:  Persistent bibasilar pleural effusions:   Right hemidiaphragm palsy:  Admitted with with increased shortness of breath, dyspnea with minimal exertion and small volume daily hemoptysis.  Also with marked decline in PFTs (FEV1 1.22L, 50% on 8/18 to 0.98L, 40% on 9/7).  Chest CT (9/8) with increased effusions and groundglass changes.  DSA positive but stable (as below).  BNP markedly elevated (30k) and also with increased BLE edema.  Etiology unclear and is likely multi-factorial with DDx including pulmonary edema from hypervolemia, rejection (ACR +/- AMR), alveolar hemorrhage, and/or infection.  Aggressively diuresed with some improvement in symptoms, small volume morning hemoptysis persists.  CXR (9/12) with stable diffuse mixed opacities and small bibasilar effusions (right effusion previously noted to be more posterior per CT  film from 9/8, less visible on 1-view CXR).  Bronchoscopy (9/13) unremarkable, BAL of lingula sent.  S/p right pigtail chest tube placement (9/13), transudative with moderate output initially but quickly decreasing.  S/p aspiration of left pleural effusion (9/13), CXR today with persistent left lateral loculated pleural effusion (personally reviewed).  - Increase diuresis as able to address increased pulmonary edema on CXR today, persistent pleural effusions, and persistent hypoxia  - BAL studies (9/13) with GPC, following cytology  - Left pleural culture (9/13) NGTD, following cytology (added on today)  - Right pleural studies (9/13) pending, cell count lymph predominant, transudative, following cytology  - Defer ABX at this time (bronch culture 75% monocytes, right pleural study lymph  predominant 39%) pending ID workup  - Considering high dose steroids tomorrow if pulmonary symptoms not improved with diuresis, defer today  - Supplemental O2 to keep >92%  - IS and Aerobika q1h w/a  - Repeat CXR 2- view daily  - Right chest tube to suction, defer lytics in favor of diuresis given stable imaging, reevaluate daily  - Considering IR consult for drainage of left pleural effusion if persisting despite aggressive diuresis, defer today     Immunosuppression:  - Tacrolimus 3.5 mg BID.  Goal level 8-12.  Repeat level 9/16 (ordered).  -  mg BID  - Prednisone 10 mg BID with next taper due 9/15 (ordered)     Prophylaxis:   - Bactrim for PJP ppx qMWF (resumed 9/13, previously stopped d/t hyperkalemia, dapsone stopped d/t anemia, Pentamidine neb not tolerated on 9/7 after only 5 minutes d/t excessive coughing)  - VGCV for CMV ppx through 9/28, D+/R+, recent CMV negative 9/7     Positive DSA: Newly positive on 8/10 with DQB2 mfi 2155.  Most recent DSA on 9/7 with decreased DQB5 from prior (7033-->5744).  - DSA repeated STAT (ordered)     EBV viremia: Low level (1374 on 9/7), not likely to be clinically significant.  - Following monthly as OP     Other relevant problems being managed by the primary team:     Diarrhea: Etiology unclear.  C diff negative on 9/10.  May be medication related (MMF), but unable to transition to Myfortic given NPO and reluctant to switch to azathioprine until etiology of dyspnea is clarified.  Loperamide 2 mg TID started 9/12 after 10 loose stools reported the day prior, then with no stool overnight 9/12 so held.  Goal to titrate to antidiarrheal 3 stools daily, management per primary.  - Still waiting for enteric panel to be sent (ordered 9/12)     BACILIO: Patient required a therapeutic dose of tacrolimus.  Continue efforts at diuresis  Avoid other nephrotoxins.      We appreciate the excellent care provided by the [place] [id number] team.  Recommendations communicated via in person  rounding and this note.  Will continue to follow along closely, please do not hesitate to call with any questions or concerns.     Patient discussed with Dr. Gong.    Catherine Johnson, DNP, APRN, CNP  Inpatient Nurse Practitioner  Pulmonary CF/Transplant     Subjective & Interval History:     Remains on 1L NC with infrequent scant/small volume hemoptysis.  Right chest tube placed yesterday with moderate output several hours after placement, now scant to small.  C/o pain at chest tube site with movement, minimal at rest.  Loose stools much improved yesterday at ~2 episodes, increased x1 this morning.    Review of Systems:     C: No fever, no chills  INTEGUMENTARY/SKIN: No rash or obvious new lesions  ENT/MOUTH: No sore throat, no sinus pain, no nasal congestion or drainage  RESP: See interval history  CV: No chest pain, no palpitations  GI: No nausea, no vomiting, no reflux symptoms  : No dysuria  MUSCULOSKELETAL: + chest tube site pain  ENDOCRINE: Blood sugars intermittently elevated  NEURO: No headache, no numbness or tingling  PSYCHIATRIC: Mood stable    Physical Exam:     All notes, images, and labs from past 24 hours (at minimum) were reviewed.    Vital signs:  Temp: 98.4  F (36.9  C) Temp src: Oral BP: 127/68 Pulse: 83   Resp: 20 SpO2: 98 % O2 Device: Nasal cannula Oxygen Delivery: 1 LPM   Weight: 71 kg (156 lb 8.4 oz)  I/O:     Intake/Output Summary (Last 24 hours) at 9/14/2022 0751  Last data filed at 9/14/2022 0656  Gross per 24 hour   Intake 1160 ml   Output 1000 ml   Net 160 ml     Constitutional: Sitting up in a wheelchair, in no apparent distress.   HEENT: Eyes with pink conjunctivae, anicteric.  Oral mucosa moist without lesions.    PULM: Mildly diminished air flow bilaterally.  No crackles, no rhonchi, no wheezes.  Non-labored breathing on 1L NC.  Chest tube without air leak.  CV: Normal S1 and S2.  RRR.  No murmur, gallop, or rub.  1+ BLE edema.   ABD: NABS, soft, nontender, nondistended.  PEG/J tube  site not visualized.  MSK: Moves all extremities.     NEURO: Alert and oriented, conversant.   SKIN: Warm, dry.  No rash on limited exam.   PSYCH: Mood stable.     Lines, Drains, and Devices:  Peripheral IV 09/10/22 Anterior;Left Lower forearm (Active)   Site Assessment WDL 09/13/22 2341   Line Status Saline locked 09/13/22 2341   Dressing Intervention New dressing  09/10/22 0135   Phlebitis Scale 0-->no symptoms 09/13/22 2341   Infiltration Scale 0 09/13/22 2341   Number of days: 4     Data:     LABS    CMP:   Recent Labs   Lab 09/14/22  0626 09/14/22  0333 09/13/22  2338 09/13/22  2110 09/13/22  0809 09/13/22  0624 09/12/22  0924 09/12/22  0851 09/11/22  1805 09/11/22  1732 09/10/22  0814 09/10/22  0712 09/08/22  0737 09/07/22  0858     --   --   --   --  143  --  144  --  144   < > 144   < > 144   POTASSIUM 4.1  --   --   --   --  4.1  --  3.6  --  3.8   < > 4.0   < > 3.8   CHLORIDE 92*  --   --   --   --  93*  --  95*  --  94*   < > 95*   < > 93*   CO2 45*  --   --   --   --  47*  --  46*  --  48*   < > 48*   < > 43*   ANIONGAP 4*  --   --   --   --  3*  --  3*  --  2*   < > 1*   < > 8   * 226* 209* 142*   < > 145*   < > 162*   < > 178*   < > 184*   < > 87   BUN 84.5*  --   --   --   --  74.6*  --  74.4*  --  76.0*   < > 76.5*   < > 90.3*   CR 1.57*  --   --   --   --  1.38*  --  1.42*  --  1.53*   < > 1.45*   < > 1.90*   GFRESTIMATED 37*  --   --   --   --  44*  --  42*  --  39*   < > 41*   < > 30*   ESTUARDO 9.1  --   --   --   --  9.2  --  9.2  --  9.2   < > 9.1   < > 9.4   MAG  --   --   --   --   --   --   --   --   --   --   --  2.3  --  2.7*   PHOS  --   --   --   --   --   --   --   --   --  2.3*  --  3.1  --   --    PROTTOTAL  --   --   --   --   --  5.6*  --   --   --   --   --  5.5*  --  6.0*   ALBUMIN  --   --   --   --   --   --   --   --   --  3.3*  --  3.1*  --  3.4*   BILITOTAL  --   --   --   --   --   --   --   --   --   --   --  0.2  --  0.2   ALKPHOS  --   --   --   --   --   --   --    --   --   --   --  88  --  98   AST  --   --   --   --   --   --   --   --   --   --   --  17  --  16   ALT  --   --   --   --   --   --   --   --   --   --   --  11  --  14    < > = values in this interval not displayed.     CBC:   Recent Labs   Lab 09/14/22  0626 09/13/22  0624 09/12/22  0851 09/11/22  1024   WBC 7.4 7.9 8.0 9.3   RBC 2.52* 2.45* 2.52* 2.60*   HGB 7.8* 7.5* 7.6* 7.8*   HCT 27.0* 25.9* 26.8* 26.9*   * 106* 106* 104*   MCH 31.0 30.6 30.2 30.0   MCHC 28.9* 29.0* 28.4* 29.0*   RDW 19.4* 19.4* 19.6* 19.5*    300 276 282       INR: No lab results found in last 7 days.    Glucose:   Recent Labs   Lab 09/14/22  0626 09/14/22  0333 09/13/22  2338 09/13/22  2110 09/13/22  1615 09/13/22  1056   * 226* 209* 142* 201* 157*       Blood Gas:   Recent Labs   Lab 09/14/22  0626 09/13/22  0624 09/12/22  0851   PHV 7.38 7.36 7.36   PCO2V 77* 82* 81*   PO2V 58* 109* 45   HCO3V 45* 47* 46*   LINDY 18.7* 17.9* 15.5*   O2PER 20 30 2       Culture Data No results for input(s): CULT in the last 168 hours.    Virology Data:   Lab Results   Component Value Date    FLUAH1 Not Detected 09/13/2022    FLUAH3 Not Detected 09/13/2022    IE9683 Not Detected 09/13/2022    IFLUB Not Detected 09/13/2022    RSVA Not Detected 09/13/2022    RSVB Not Detected 09/13/2022    PIV1 Not Detected 09/13/2022    PIV2 Not Detected 09/13/2022    PIV3 Not Detected 09/13/2022    HMPV Not Detected 09/13/2022       Historical CMV results (last 3 of prior testing):  Lab Results   Component Value Date    CMVQNT Not Detected 09/07/2022    CMVQNT Not Detected 09/01/2022    CMVQNT Not Detected 08/29/2022     No results found for: CMVLOG    Urine Studies    Recent Labs   Lab Test 08/01/22  0319 07/08/22  0831 07/05/22  1004   URINEPH 8.0* 5.5 5.5   NITRITE Negative Negative Negative   LEUKEST Negative Negative Negative   WBCU  --  1 5       Most Recent [place] Pulmonary Function Testing (FVC/FEV1 only)  FVC-Pre   Date Value Ref Range  Status   09/07/2022 1.01 L    09/01/2022 1.07 L    08/24/2022 1.23 L    08/18/2022 1.33 L      FVC-%Pred-Pre   Date Value Ref Range Status   09/07/2022 33 %    09/01/2022 35 %    08/24/2022 40 %    08/18/2022 43 %      FEV1-Pre   Date Value Ref Range Status   09/07/2022 0.98 L    09/01/2022 1.02 L    08/24/2022 1.17 L    08/18/2022 1.22 L      FEV1-%Pred-Pre   Date Value Ref Range Status   09/07/2022 40 %    09/01/2022 42 %    08/24/2022 48 %    08/18/2022 50 %        IMAGING    Recent Results (from the past 48 hour(s))   XR Chest Port 1 View    Narrative    Exam: XR CHEST PORT 1 VIEW, 9/12/2022 12:58 PM    Comparison: CT chest 9/8/2022, x-ray chest 9/7/2022.    History: Interval f/u    Findings:  Single AP semiupright view of the chest. Postsurgical changes of  bilateral lung transplant with intact clam shell sternotomy wires.    Trachea is midline. Stable cardiomediastinal silhouette. Perihilar and  bibasilar mixed opacities. No pneumothorax. Unchanged bilateral  loculated pleural effusions. The upper abdomen is unremarkable.      Impression    Impression:   1. Unchanged diffuse mixed opacities likely represent continued  pulmonary edema and atelectasis.  2. Unchanged bilateral loculated pleural effusions.    I have personally reviewed the examination and initial interpretation  and I agree with the findings.    CECI REGAN DO         SYSTEM ID:  [id number]   XR Chest Port 1 View    Narrative    EXAM: XR CHEST PORT 1 VIEW  9/13/2022 10:52 AM     HISTORY:  eval Pneumo post chest tube placement       COMPARISON:  9/12/2022    FINDINGS:   AP portable view of the chest. Postoperative changes of bilateral lung  transplant with clamshell sternotomy wires. Interval placement of a  right basilar chest tube. Midline trachea. Cardiomediastinal  silhouette is stable. No appreciable pneumothorax. Unchanged bilateral  loculated pleural effusions. Similar bibasilar and perihilar mixed  interstitial and airspace opacities.  Visualized upper abdomen is  unremarkable.      Impression    IMPRESSION:   1. Interval placement of a right basilar chest tube. No appreciable  pneumothorax.  2. Stable bilateral loculated pleural effusions.  3. Similar mixed interstitial and pulmonary opacities, likely  pulmonary edema and atelectasis.    I have personally reviewed the examination and initial interpretation  and I agree with the findings.    JOHANN MORAES MD         SYSTEM ID:  [id number]   XR Chest Port 1 View    Narrative    EXAM: XR CHEST PORT 1 VIEW  9/13/2022 2:57 PM     HISTORY:  new air leak following right chest tube placement       COMPARISON:  Same day chest radiograph    FINDINGS:   AP portable view of the chest. Midline trachea. Cardiomediastinal  silhouette is stable. Surgical changes of bilateral lung transplant  with clamshell sternotomy wires. Stable positioning of a right basilar  chest tube. Similar appearance of the loculated left pleural effusion  and mildly increased apical component of the loculated right pleural  effusion. No definite pneumothorax. Similar diffuse mixed interstitial  and airspace opacities. Visualized upper abdomen unremarkable.      Impression    IMPRESSION:   1. No appreciable pneumothorax with right-sided chest tube in place.  2. Similar appearance of the loculated left pleural effusion with  increased apical component of the loculated right pleural effusion.  3. Similar bilateral mixed interstitial and airspace opacities.    I have personally reviewed the examination and initial interpretation  and I agree with the findings.    GEORGE ROGERS MD         SYSTEM ID:  [id number]

## 2022-09-14 NOTE — PROGRESS NOTES
CLINICAL NUTRITION SERVICES - BRIEF NOTE     Nutrition Prescription    RECOMMENDATIONS FOR MDs/PROVIDERS TO ORDER:  -Continue to avoid medications in liquid suspension when possible d/t possibility of promoting osmotic diarrhea.     Recommendations already ordered by Registered Dietitian (RD):  -Rechecking phosphorous given impaired renal fx, last phosphorous lab of 2.3 on 9/11.  -Changing liquid MVI to renal tablet form   -Modifying TF regimen to fiber containing renal formula given ongoing diarrhea, discontinuing additional protein modulars d/t rising BUN and new regimen meeting minimum estimated protein needs:  Nepro @ goal 40 ml/hr (960 ml/day) to provide 1728 kcals (30 kcal/kg/day), 78 g PRO (1.4 g/kg/day), 701 ml free H2O, 155 g CHO and 24 g Fiber daily.  --OK to initiate @ goal rate.     Future/Additional Recommendations:  -Continue to monitor TF tolerance, stooling, and labs.      Findings  -Pt with ongoing diarrhea - LBM 9/12 watery yellow / brown stool. 10 stools since admission on 9/9.   --C. Diff negative as of 9/10.   --Imodium now PRN vs scheduled TID before d/t decreased frequency of stooling.     INTERVENTIONS  Implementation  Enteral Nutrition - Modify composition and rate     Follow up/Monitoring  RD will continue to follow per protocol.     Parveen Aviles RD, LD  6C RD pager: [phone]  Weekend/Holiday RD pager: [phone]

## 2022-09-14 NOTE — PROGRESS NOTES
Brief Pulmonology Note    Called regarding chest tube w/o sutures (sutures seen on dressing). Replaced sutures at bedside.     Patient discussed w/ Dr. Mccullough.    Khushbu Judge  Pulm/CC PGY4

## 2022-09-14 NOTE — PLAN OF CARE
[id number]    Pt alert and oriented. VSS.    Chest tube to -20 wall suction. Output documented in flowsheets. Stand by assistance OOB to commode. Pain reported 8/10 only with movement. Dressing CDI.    All comfort and safety measures are in place.  Will continue to monitor pt for the remainder of the shift.      Goal Outcome Evaluation:    Plan of Care Reviewed With: patient     Overall Patient Progress: no change      Problem: Plan of Care - These are the overarching goals to be used throughout the patient stay.    Goal: Plan of Care Review/Shift Note  Description: The Plan of Care Review/Shift note should be completed every shift.  The Outcome Evaluation is a brief statement about your assessment that the patient is improving, declining, or no change.  This information will be displayed automatically on your shift note.  Outcome: Ongoing, Progressing  Flowsheets (Taken 9/13/2022 2210)  Plan of Care Reviewed With: patient  Overall Patient Progress: no change  Goal: Optimal Comfort and Wellbeing  Outcome: Ongoing, Progressing  Intervention: Provide Person-Centered Care  Recent Flowsheet Documentation  Taken 9/13/2022 2020 by Rika Morgan, RN  Trust Relationship/Rapport:   care explained   choices provided     Problem: Activity Intolerance (Pulmonary Impairment)  Goal: Improved Activity Tolerance  Outcome: Ongoing, Progressing     Problem: Airway Clearance Ineffective (Pulmonary Impairment)  Goal: Effective Airway Clearance  Outcome: Ongoing, Progressing     Problem: Feeding Intolerance (Enteral Nutrition)  Goal: Feeding Tolerance  Outcome: Ongoing, Progressing

## 2022-09-14 NOTE — PROGRESS NOTES
[facility]    Medicine Progress Note - Hospitalist Service, GOLD TEAM 10    Date of Admission:  9/9/2022    Assessment & Plan        Sofie Rodriguez is a 62 year old female with pertinent hx of end stage COPD s/p bilateral sequential lung transplant (6/22) on triple IS ( MMF/Tacro/pred), pyloric ulcer, recent ERCP c/f hemobilia, gastroparesis s/p GJ tube placement and Percutaneous J tube presents for a planned admission from transplant pulmonology to work up antibody medicated rejection versus infection.     Interval Changes:  -Bedside POCUS with dilated IVC, bilateral effusion - re-dose with IV lasix 80 mg, add thiazide for sequential blockade, will repeat in AM  -Continue chest tube to suction  -Suture became undone and discussed with pulm who re-sutured at bedside  -Resume immodium prn  -Add insulin lantus 5 units daily    # End stage COPD s/p bilateral sequential lung transplant (6/22)   # hx of Bilateral hydrothorax after tx  # persistent hypercapnia   # Mycobacterium peregrinum colonization   Baseline oxygen need - 1L NC.   Recent imaging - 9/8/22 CT chest - R>L pleural effusions with bilateral hazy                              nodular opacities and paraseptal thickening.   PFT - FEV1 40%, FVC - 33% FEV1/FVC 79%  Immune suppression - Tacrolimus 3.5 mg BID Goal level 8-12,  mg                                         BID ( decreased dose in the setting of GI symptoms),                                        prednisone taper currently on 10 mg BID until 9/15/22.  CMV ppx - Valgancyclovir 450 mg TID ( ending 9/28)   Oral candida ppx - Nystatin   -S/p right pigtail chest tube placement on 9/13, left thoracentesis on 9/13   Plan:  >Chest tube to drainage  >Lidoderm patch for chest tube  >IV diuresis    #Acute on Chronic HFpEF  Echo 8/15/22 - EF 60-65% with trace mitral insufficiency, admission BNP > 30K   Plan:  IV lasix, PO acetazolamide  Add thiazide (9/14) for  sequential blockade    # Diarrhea, Loose stools  # hx of C diff   Patient is on MMF with chronic loose stools at baseline. Notes reflect recent dose reduction of MMF for this.  -C diff (negtaive on 9/10)  Plan:  >Immodium prn    #Steroid induced hyperglycemia  -Lantus 5 units, sliding scale q4h    # Pyloric ulcer EGD 8/3/2022  # Acute on chronic macrocytic anemia    hx of coffee ground emesis. Patient had a EGD 8/3 which showed a Clear base ulcer in the pyloric channel identified as the culprit lesion. At the time was also noted to have excess gastric accumulation 2/2 pyloric inflammation. Vent G tube placed to allow for drainage. Vented to gravity now.  repeat EGD planned in October 2022 to check healing.  Patient's hgb hit loco 6.3 9/1 and since then in the 7s-8s. 7.1 this admission.   Plan:  - Pantoprazole 40 mg BID     - patient consented for blood transfusion, type and screen ordered.     # extra hepatic and intrahepatic biliary dilation c/f hemobilia   # Intra ductal papillary mucinous neoplasm   ERCP 8/11 showed hemobilia.   - Monitor LFTs     #CKD stage III   Patient has been variable GFRs 30-50s in the last few months. Most recent Cr trend 1.5-1.9 1.56 9/8/22.   - CTM     #Severe gastroparesis s/p  GJ tube placement 7/27/2022  - RD nutrition consult placed for TF  - Percutaneous j tube in place with G venting to gravity     #HTN  # A flutter with RVR/SVT   . Has recently completed zio patch.  - Continue PTA metoprolol 50 mg BID          Diet: Adult Formula Drip Feeding: Continuous Novasource Renal; Jejunostomy; Goal Rate: 35 ml/hr--okay to resume at goal; mL/hr; Medication - Feeding Tube Flush Frequency: At least 15-30 mL water before and after medication administration and with tube c...  NPO per Anesthesia Guidelines for Procedure/Surgery Except for: Meds    DVT Prophylaxis: Pneumatic Compression Devices  Dong Catheter: Not present  Central Lines: None  Cardiac Monitoring: None  Code Status: Full Code       Disposition Plan     Expected Discharge Date: 09/15/2022        Discharge Comments: Bronch 9/13 and currently IV diuresing        The patient's care was discussed with the Bedside Nurse, Patient and Pumonary transplant Team.    Chidi Fay MD  Hospitalist Service, GOLD TEAM [address] [place] [address]  Securely message with the Vocera Web Console (learn more here)  Text page via [facility] Paging/Directory   Please see signed in provider for up to date coverage information      Clinically Significant Risk Factors Present on Admission                       ______________________________________________________________________    Interval History   She reports pain near chest tube site. She has begun to have loose stools again and would like immodium. Reports she feels comfortable asking for it as needed. She denies fever or chills. She states breathing is similar. Denies fever or chills.     Data reviewed today: I reviewed all medications, new labs and imaging results over the last 24 hours.    Physical Exam   Vital Signs: Temp: 98.4  F (36.9  C) Temp src: Oral BP: 120/74 Pulse: 76   Resp: 20 SpO2: 98 % O2 Device: Nasal cannula Oxygen Delivery: 1 LPM  Weight: 156 lbs 8.43 oz     Constitutional: awake, tired after procedure, somnolent but awakens to voice  Neck: + JVD  Respiratory: crackles in lower posterior lung fields bilaterally , right chest tube to atrium and wall suction  Cardiovascular: Regular rate and rhythm, normal S1, pronounced s2, systolic murmur 2/6  GI: Normal bowel sounds, soft, non-distended, non-tender, GJ placement  Ext: 1+ pitting edema  Neuropsychiatric: moving all extremities, no focal deficit, holding eye contact  Data   Recent Results (from the past 24 hour(s))   XR Chest 2 Views    Narrative    EXAMINATION: XR CHEST 2 VIEWS, 9/14/2022 10:00 AM    COMPARISON: 9/13/2022    HISTORY: interval follow up, lung transplant, right chest tube    FINDINGS: Cardiac  silhouette is enlarged and unchanged, partially  obscured. Right chest tube in the lung bases remains in place. Small  right pleural effusion is unchanged. Small to moderate-sized left  pleural effusion is also unchanged. Mixed perihilar airspace and  interstitial opacities overall slightly increased.      Impression    IMPRESSION:   1. Overall increased mixed airspace and interstitial opacities in the  lungs. This could be due to edema or infection.  2. Continued left greater than right pleural effusions, not  substantially changed.    GEORGE ROGERS MD         SYSTEM ID:  [id number]   XR Abdomen 2 Views    Narrative    EXAM: XR abdomen PORT 2 VIEW  9/13/2022 2:57 PM     HISTORY:  new air leak following right chest tube placement       COMPARISON:  Abdominal radiograph 8/19/2022. Tube placement  fluoroscopy images 8/31/2022.    FINDINGS:   AP supine and upright portable view of the abdomen.  Gastrojejunostomy tube in place extending through the duodenum with  tip overlying the mid left abdomen on supine imaging and right-sided  abdomen on upright. Tip may have slightly retracted from prior  placement fluoroscopy images where this was extending into the right  lower quadrant small bowel loops although this appears to move  positionally between upright and supine imaging.  Common bile duct stent and pancreatic duct stents in place.  Visualized upper abdomen unremarkable. The bowel gas pattern is  nonobstructive. No visualized pneumatosis or portal venous gas.   Chest findings better evaluated on chest x-ray from same day.      Impression    IMPRESSION:   Percutaneous gastrojejunostomy tube in place with tip terminating  within the proximal jejunum. This may be slightly retracted from  fluoroscopy placement images on supine view although appears fairly  similar in position on the upright image.    I have personally reviewed the examination and initial interpretation  and I agree with the findings.    YANNICK CONTRERAS  MD PRAMOD         SYSTEM ID:  [id number]

## 2022-09-15 ENCOUNTER — APPOINTMENT (OUTPATIENT)
Dept: INTERVENTIONAL RADIOLOGY/VASCULAR | Facility: CLINIC | Age: 62
DRG: 205 | End: 2022-09-15
Attending: NURSE PRACTITIONER
Payer: MEDICARE

## 2022-09-15 ENCOUNTER — APPOINTMENT (OUTPATIENT)
Dept: GENERAL RADIOLOGY | Facility: CLINIC | Age: 62
DRG: 205 | End: 2022-09-15
Attending: PHYSICIAN ASSISTANT
Payer: MEDICARE

## 2022-09-15 LAB
ANION GAP SERPL CALCULATED.3IONS-SCNC: 4 MMOL/L (ref 7–15)
APPEARANCE FLD: ABNORMAL
ATRIAL RATE - MUSE: 70 BPM
BACTERIA BRONCH: NORMAL
BUN SERPL-MCNC: 92.6 MG/DL (ref 8–23)
CALCIUM SERPL-MCNC: 9.6 MG/DL (ref 8.8–10.2)
CELL COUNT BODY FLUID SOURCE: ABNORMAL
CHLORIDE SERPL-SCNC: 94 MMOL/L (ref 98–107)
COLOR FLD: ABNORMAL
CREAT SERPL-MCNC: 1.84 MG/DL (ref 0.51–0.95)
DEPRECATED HCO3 PLAS-SCNC: 46 MMOL/L (ref 22–29)
DIASTOLIC BLOOD PRESSURE - MUSE: NORMAL MMHG
DONOR IDENTIFICATION: NORMAL
DQB2: 5825
DSA COMMENTS: NORMAL
DSA PRESENT: YES
DSA TEST METHOD: NORMAL
ERYTHROCYTE [DISTWIDTH] IN BLOOD BY AUTOMATED COUNT: 20 % (ref 10–15)
GFR SERPL CREATININE-BSD FRML MDRD: 31 ML/MIN/1.73M2
GLUCOSE BLDC GLUCOMTR-MCNC: 154 MG/DL (ref 70–99)
GLUCOSE BLDC GLUCOMTR-MCNC: 159 MG/DL (ref 70–99)
GLUCOSE BLDC GLUCOMTR-MCNC: 177 MG/DL (ref 70–99)
GLUCOSE BLDC GLUCOMTR-MCNC: 199 MG/DL (ref 70–99)
GLUCOSE BLDC GLUCOMTR-MCNC: 200 MG/DL (ref 70–99)
GLUCOSE BODY FLUID SOURCE: NORMAL
GLUCOSE FLD-MCNC: 173 MG/DL
GLUCOSE SERPL-MCNC: 151 MG/DL (ref 70–99)
HCT VFR BLD AUTO: 27.9 % (ref 35–47)
HGB BLD-MCNC: 7.9 G/DL (ref 11.7–15.7)
INR PPP: 0.94 (ref 0.85–1.15)
INTERPRETATION ECG - MUSE: NORMAL
LD BODY BODY FLUID SOURCE: NORMAL
LDH FLD L TO P-CCNC: 180 U/L
LDH SERPL L TO P-CCNC: 232 U/L (ref 0–250)
LYMPHOCYTES NFR FLD MANUAL: 65 %
MCH RBC QN AUTO: 30.4 PG (ref 26.5–33)
MCHC RBC AUTO-ENTMCNC: 28.3 G/DL (ref 31.5–36.5)
MCV RBC AUTO: 107 FL (ref 78–100)
MONOS+MACROS NFR FLD MANUAL: 2 %
NEUTS BAND NFR FLD MANUAL: 33 %
ORGAN: NORMAL
P AXIS - MUSE: 58 DEGREES
PATH REPORT.COMMENTS IMP SPEC: NORMAL
PATH REPORT.FINAL DX SPEC: NORMAL
PATH REPORT.GROSS SPEC: NORMAL
PATH REPORT.MICROSCOPIC SPEC OTHER STN: NORMAL
PATH REPORT.RELEVANT HX SPEC: NORMAL
PLATELET # BLD AUTO: 268 10E3/UL (ref 150–450)
POTASSIUM SERPL-SCNC: 4.7 MMOL/L (ref 3.4–5.3)
POTASSIUM SERPL-SCNC: 5 MMOL/L (ref 3.4–5.3)
PR INTERVAL - MUSE: 120 MS
PROT FLD-MCNC: 1.5 G/DL
PROT SERPL-MCNC: 6.2 G/DL (ref 6.4–8.3)
PROTEIN BODY FLUID SOURCE: NORMAL
QRS DURATION - MUSE: 74 MS
QT - MUSE: 406 MS
QTC - MUSE: 438 MS
R AXIS - MUSE: 24 DEGREES
RBC # BLD AUTO: 2.6 10E6/UL (ref 3.8–5.2)
SA 1 CELL: NORMAL
SA 1 TEST METHOD: NORMAL
SA 2 CELL: NORMAL
SA 2 TEST METHOD: NORMAL
SA1 HI RISK ABY: NORMAL
SA1 MOD RISK ABY: NORMAL
SA2 HI RISK ABY: NORMAL
SA2 MOD RISK ABY: NORMAL
SODIUM SERPL-SCNC: 144 MMOL/L (ref 136–145)
SYSTOLIC BLOOD PRESSURE - MUSE: NORMAL MMHG
T AXIS - MUSE: 58 DEGREES
TRIGL FLD-MCNC: 16 MG/DL
TRIGLYCERIDE BODY FLUID SOURCE: NORMAL
UNACCEPTABLE ANTIGENS: NORMAL
UNOS CPRA: 37
VENTRICULAR RATE- MUSE: 70 BPM
WBC # BLD AUTO: 6.9 10E3/UL (ref 4–11)
WBC # FLD AUTO: 148 /UL
ZZZSA 1  COMMENTS: NORMAL
ZZZSA 2 COMMENTS: NORMAL

## 2022-09-15 PROCEDURE — 84155 ASSAY OF PROTEIN SERUM: CPT | Performed by: STUDENT IN AN ORGANIZED HEALTH CARE EDUCATION/TRAINING PROGRAM

## 2022-09-15 PROCEDURE — 250N000009 HC RX 250: Performed by: STUDENT IN AN ORGANIZED HEALTH CARE EDUCATION/TRAINING PROGRAM

## 2022-09-15 PROCEDURE — 250N000013 HC RX MED GY IP 250 OP 250 PS 637

## 2022-09-15 PROCEDURE — 83615 LACTATE (LD) (LDH) ENZYME: CPT | Performed by: STUDENT IN AN ORGANIZED HEALTH CARE EDUCATION/TRAINING PROGRAM

## 2022-09-15 PROCEDURE — 88112 CYTOPATH CELL ENHANCE TECH: CPT | Mod: 26 | Performed by: PATHOLOGY

## 2022-09-15 PROCEDURE — 89051 BODY FLUID CELL COUNT: CPT | Performed by: STUDENT IN AN ORGANIZED HEALTH CARE EDUCATION/TRAINING PROGRAM

## 2022-09-15 PROCEDURE — 250N000013 HC RX MED GY IP 250 OP 250 PS 637: Performed by: STUDENT IN AN ORGANIZED HEALTH CARE EDUCATION/TRAINING PROGRAM

## 2022-09-15 PROCEDURE — 71046 X-RAY EXAM CHEST 2 VIEWS: CPT

## 2022-09-15 PROCEDURE — C1729 CATH, DRAINAGE: HCPCS

## 2022-09-15 PROCEDURE — 36415 COLL VENOUS BLD VENIPUNCTURE: CPT | Performed by: NURSE PRACTITIONER

## 2022-09-15 PROCEDURE — 272N000192 HC ACCESSORY CR2

## 2022-09-15 PROCEDURE — 84157 ASSAY OF PROTEIN OTHER: CPT | Performed by: STUDENT IN AN ORGANIZED HEALTH CARE EDUCATION/TRAINING PROGRAM

## 2022-09-15 PROCEDURE — C1769 GUIDE WIRE: HCPCS

## 2022-09-15 PROCEDURE — 87070 CULTURE OTHR SPECIMN AEROBIC: CPT | Performed by: STUDENT IN AN ORGANIZED HEALTH CARE EDUCATION/TRAINING PROGRAM

## 2022-09-15 PROCEDURE — 87075 CULTR BACTERIA EXCEPT BLOOD: CPT | Performed by: STUDENT IN AN ORGANIZED HEALTH CARE EDUCATION/TRAINING PROGRAM

## 2022-09-15 PROCEDURE — 272N000196 HC ACCESSORY CR5

## 2022-09-15 PROCEDURE — 36415 COLL VENOUS BLD VENIPUNCTURE: CPT | Performed by: INTERNAL MEDICINE

## 2022-09-15 PROCEDURE — 0W9B30Z DRAINAGE OF LEFT PLEURAL CAVITY WITH DRAINAGE DEVICE, PERCUTANEOUS APPROACH: ICD-10-PCS | Performed by: RADIOLOGY

## 2022-09-15 PROCEDURE — 88305 TISSUE EXAM BY PATHOLOGIST: CPT | Mod: 26 | Performed by: PATHOLOGY

## 2022-09-15 PROCEDURE — 71046 X-RAY EXAM CHEST 2 VIEWS: CPT | Mod: 26 | Performed by: RADIOLOGY

## 2022-09-15 PROCEDURE — 84478 ASSAY OF TRIGLYCERIDES: CPT | Performed by: STUDENT IN AN ORGANIZED HEALTH CARE EDUCATION/TRAINING PROGRAM

## 2022-09-15 PROCEDURE — 99233 SBSQ HOSP IP/OBS HIGH 50: CPT | Performed by: STUDENT IN AN ORGANIZED HEALTH CARE EDUCATION/TRAINING PROGRAM

## 2022-09-15 PROCEDURE — 250N000012 HC RX MED GY IP 250 OP 636 PS 637: Performed by: PHYSICIAN ASSISTANT

## 2022-09-15 PROCEDURE — 250N000013 HC RX MED GY IP 250 OP 250 PS 637: Performed by: INTERNAL MEDICINE

## 2022-09-15 PROCEDURE — 87206 SMEAR FLUORESCENT/ACID STAI: CPT | Performed by: STUDENT IN AN ORGANIZED HEALTH CARE EDUCATION/TRAINING PROGRAM

## 2022-09-15 PROCEDURE — 82945 GLUCOSE OTHER FLUID: CPT | Performed by: STUDENT IN AN ORGANIZED HEALTH CARE EDUCATION/TRAINING PROGRAM

## 2022-09-15 PROCEDURE — 250N000013 HC RX MED GY IP 250 OP 250 PS 637: Performed by: NURSE PRACTITIONER

## 2022-09-15 PROCEDURE — 214N000001 HC R&B CCU UMMC

## 2022-09-15 PROCEDURE — 84132 ASSAY OF SERUM POTASSIUM: CPT | Performed by: STUDENT IN AN ORGANIZED HEALTH CARE EDUCATION/TRAINING PROGRAM

## 2022-09-15 PROCEDURE — 85610 PROTHROMBIN TIME: CPT | Performed by: NURSE PRACTITIONER

## 2022-09-15 PROCEDURE — 85014 HEMATOCRIT: CPT | Performed by: INTERNAL MEDICINE

## 2022-09-15 PROCEDURE — 250N000012 HC RX MED GY IP 250 OP 636 PS 637

## 2022-09-15 PROCEDURE — 36415 COLL VENOUS BLD VENIPUNCTURE: CPT | Performed by: STUDENT IN AN ORGANIZED HEALTH CARE EDUCATION/TRAINING PROGRAM

## 2022-09-15 PROCEDURE — 99233 SBSQ HOSP IP/OBS HIGH 50: CPT | Mod: 24 | Performed by: NURSE PRACTITIONER

## 2022-09-15 PROCEDURE — 250N000011 HC RX IP 250 OP 636: Performed by: STUDENT IN AN ORGANIZED HEALTH CARE EDUCATION/TRAINING PROGRAM

## 2022-09-15 PROCEDURE — 250N000012 HC RX MED GY IP 250 OP 636 PS 637: Performed by: INTERNAL MEDICINE

## 2022-09-15 PROCEDURE — 32557 INSERT CATH PLEURA W/ IMAGE: CPT | Mod: LT | Performed by: RADIOLOGY

## 2022-09-15 PROCEDURE — 82310 ASSAY OF CALCIUM: CPT | Performed by: INTERNAL MEDICINE

## 2022-09-15 PROCEDURE — 32557 INSERT CATH PLEURA W/ IMAGE: CPT

## 2022-09-15 PROCEDURE — 87102 FUNGUS ISOLATION CULTURE: CPT | Performed by: STUDENT IN AN ORGANIZED HEALTH CARE EDUCATION/TRAINING PROGRAM

## 2022-09-15 PROCEDURE — 272N000500 HC NEEDLE CR2

## 2022-09-15 RX ORDER — HEPARIN SODIUM 5000 [USP'U]/.5ML
5000 INJECTION, SOLUTION INTRAVENOUS; SUBCUTANEOUS EVERY 12 HOURS
Status: DISCONTINUED | OUTPATIENT
Start: 2022-09-15 | End: 2022-10-08 | Stop reason: HOSPADM

## 2022-09-15 RX ORDER — LIDOCAINE HYDROCHLORIDE 10 MG/ML
1-30 INJECTION, SOLUTION EPIDURAL; INFILTRATION; INTRACAUDAL; PERINEURAL
Status: COMPLETED | OUTPATIENT
Start: 2022-09-15 | End: 2022-09-15

## 2022-09-15 RX ORDER — BUMETANIDE 0.25 MG/ML
4 INJECTION INTRAMUSCULAR; INTRAVENOUS ONCE
Status: COMPLETED | OUTPATIENT
Start: 2022-09-15 | End: 2022-09-15

## 2022-09-15 RX ORDER — FOLIC ACID 1 MG/1
1 TABLET ORAL DAILY
Status: DISCONTINUED | OUTPATIENT
Start: 2022-09-15 | End: 2022-09-23

## 2022-09-15 RX ADMIN — INSULIN ASPART 1 UNITS: 100 INJECTION, SOLUTION INTRAVENOUS; SUBCUTANEOUS at 11:22

## 2022-09-15 RX ADMIN — PREDNISONE 10 MG: 10 TABLET ORAL at 09:05

## 2022-09-15 RX ADMIN — ACETAMINOPHEN 975 MG: 325 TABLET, FILM COATED ORAL at 19:32

## 2022-09-15 RX ADMIN — METOPROLOL TARTRATE 50 MG: 50 TABLET, FILM COATED ORAL at 19:33

## 2022-09-15 RX ADMIN — ACETAMINOPHEN 975 MG: 325 TABLET, FILM COATED ORAL at 13:19

## 2022-09-15 RX ADMIN — ACETAZOLAMIDE 125 MG: 125 TABLET ORAL at 17:56

## 2022-09-15 RX ADMIN — CALCIUM CARBONATE 600 MG (1,500 MG)-VITAMIN D3 400 UNIT TABLET 1 TABLET: at 09:05

## 2022-09-15 RX ADMIN — ACETAZOLAMIDE 125 MG: 125 TABLET ORAL at 09:04

## 2022-09-15 RX ADMIN — TACROLIMUS 3.5 MG: 5 CAPSULE ORAL at 09:06

## 2022-09-15 RX ADMIN — TACROLIMUS 3.5 MG: 5 CAPSULE ORAL at 17:56

## 2022-09-15 RX ADMIN — OXYCODONE HYDROCHLORIDE 5 MG: 5 SOLUTION ORAL at 21:10

## 2022-09-15 RX ADMIN — ACETAMINOPHEN 975 MG: 325 TABLET, FILM COATED ORAL at 09:05

## 2022-09-15 RX ADMIN — OXYCODONE HYDROCHLORIDE 5 MG: 5 SOLUTION ORAL at 05:00

## 2022-09-15 RX ADMIN — CALCIUM CARBONATE 600 MG (1,500 MG)-VITAMIN D3 400 UNIT TABLET 1 TABLET: at 17:57

## 2022-09-15 RX ADMIN — Medication 40 MG: at 09:06

## 2022-09-15 RX ADMIN — OXYCODONE HYDROCHLORIDE 5 MG: 5 SOLUTION ORAL at 13:19

## 2022-09-15 RX ADMIN — Medication 40 MG: at 19:34

## 2022-09-15 RX ADMIN — INSULIN ASPART 1 UNITS: 100 INJECTION, SOLUTION INTRAVENOUS; SUBCUTANEOUS at 05:04

## 2022-09-15 RX ADMIN — NYSTATIN 1000000 UNITS: 100000 SUSPENSION ORAL at 19:33

## 2022-09-15 RX ADMIN — INSULIN ASPART 2 UNITS: 100 INJECTION, SOLUTION INTRAVENOUS; SUBCUTANEOUS at 16:09

## 2022-09-15 RX ADMIN — LIDOCAINE HYDROCHLORIDE 9 ML: 10 INJECTION, SOLUTION EPIDURAL; INFILTRATION; INTRACAUDAL; PERINEURAL at 17:08

## 2022-09-15 RX ADMIN — MYCOPHENOLATE MOFETIL 750 MG: 200 POWDER, FOR SUSPENSION ORAL at 19:32

## 2022-09-15 RX ADMIN — HEPARIN SODIUM 5000 UNITS: 5000 INJECTION, SOLUTION INTRAVENOUS; SUBCUTANEOUS at 19:33

## 2022-09-15 RX ADMIN — FOLIC ACID 1 MG: 1 TABLET ORAL at 13:19

## 2022-09-15 RX ADMIN — Medication 1 CAPSULE: at 09:06

## 2022-09-15 RX ADMIN — INSULIN ASPART 2 UNITS: 100 INJECTION, SOLUTION INTRAVENOUS; SUBCUTANEOUS at 00:41

## 2022-09-15 RX ADMIN — INSULIN ASPART 1 UNITS: 100 INJECTION, SOLUTION INTRAVENOUS; SUBCUTANEOUS at 19:44

## 2022-09-15 RX ADMIN — MYCOPHENOLATE MOFETIL 750 MG: 200 POWDER, FOR SUSPENSION ORAL at 09:06

## 2022-09-15 RX ADMIN — NYSTATIN 1000000 UNITS: 100000 SUSPENSION ORAL at 13:21

## 2022-09-15 RX ADMIN — NYSTATIN 1000000 UNITS: 100000 SUSPENSION ORAL at 09:06

## 2022-09-15 RX ADMIN — NYSTATIN 1000000 UNITS: 100000 SUSPENSION ORAL at 17:56

## 2022-09-15 RX ADMIN — METOPROLOL TARTRATE 50 MG: 50 TABLET, FILM COATED ORAL at 09:05

## 2022-09-15 RX ADMIN — PREDNISONE 7.5 MG: 2.5 TABLET ORAL at 19:32

## 2022-09-15 RX ADMIN — BUMETANIDE 4 MG: 0.25 INJECTION, SOLUTION INTRAMUSCULAR; INTRAVENOUS at 13:19

## 2022-09-15 RX ADMIN — FUROSEMIDE 80 MG: 10 INJECTION, SOLUTION INTRAVENOUS at 09:05

## 2022-09-15 ASSESSMENT — ACTIVITIES OF DAILY LIVING (ADL)
ADLS_ACUITY_SCORE: 28
ADLS_ACUITY_SCORE: 30
ADLS_ACUITY_SCORE: 28
ADLS_ACUITY_SCORE: 30

## 2022-09-15 NOTE — PROGRESS NOTES
[facility]    Medicine Progress Note - Hospitalist Service, GOLD TEAM 10    Date of Admission:  9/9/2022    Assessment & Plan        Sofie Rodriguez is a 62 year old female with pertinent hx of end stage COPD s/p bilateral sequential lung transplant (6/22) on triple IS ( MMF/Tacro/pred), pyloric ulcer, recent ERCP c/f hemobilia, gastroparesis s/p GJ tube placement and Percutaneous J tube presents for a planned admission from transplant pulmonology to work up antibody medicated rejection versus infection.     Interval Changes:  -Continue IV diuresis, if minimal improvemet and continued volume overload then plan for possible cardiology consultation in AM  -IR consult for left pig tail chest tube placement  -Start folic acid    # End stage COPD s/p bilateral sequential lung transplant (6/22)   # hx of Bilateral hydrothorax after tx  # persistent hypercapnia   # Mycobacterium peregrinum colonization   Baseline oxygen need - 1L NC.   Recent imaging - 9/8/22 CT chest - R>L pleural effusions with bilateral hazy                              nodular opacities and paraseptal thickening.   PFT - FEV1 40%, FVC - 33% FEV1/FVC 79%  Immune suppression - Tacrolimus 3.5 mg BID Goal level 8-12,  mg                                         BID ( decreased dose in the setting of GI symptoms),                                        prednisone taper currently on 10 mg BID until 9/15/22.  CMV ppx - Valgancyclovir 450 mg TID ( ending 9/28)   Oral candida ppx - Nystatin   -S/p right pigtail chest tube placement on 9/13, left thoracentesis on 9/13   Plan:  >Chest tube to drainage  >Lidoderm patch for chest tube  >IV diuresis    #Acute on Chronic HFpEF  Echo 8/15/22 - EF 60-65% with trace mitral insufficiency, admission BNP > 30K   Plan:  IV bumex, PO acetazolamide    # Diarrhea, Loose stools  # hx of C diff   Patient is on MMF with chronic loose stools at baseline. Notes reflect recent dose  reduction of MMF for this.  -C diff (negtaive on 9/10)  Plan:  >Immodium prn    #Steroid induced hyperglycemia  -Lantus 5 units, sliding scale q4h    # Pyloric ulcer EGD 8/3/2022  # Acute on chronic macrocytic anemia    hx of coffee ground emesis. Patient had a EGD 8/3 which showed a Clear base ulcer in the pyloric channel identified as the culprit lesion. At the time was also noted to have excess gastric accumulation 2/2 pyloric inflammation. Vent G tube placed to allow for drainage. Vented to gravity now.  repeat EGD planned in October 2022 to check healing.  Patient's hgb hit loco 6.3 9/1 and since then in the 7s-8s. 7.1 this admission.   Plan:  - Pantoprazole 40 mg BID     - patient consented for blood transfusion, type and screen ordered.     # extra hepatic and intrahepatic biliary dilation c/f hemobilia   # Intra ductal papillary mucinous neoplasm   ERCP 8/11 showed hemobilia.   - Monitor LFTs     #CKD stage III   Patient has been variable GFRs 30-50s in the last few months. Most recent Cr trend 1.5-1.9 1.56 9/8/22.   - CTM     #Severe gastroparesis s/p  GJ tube placement 7/27/2022  - RD nutrition consult placed for TF  - Percutaneous j tube in place with G venting to gravity     #HTN  # A flutter with RVR/SVT   . Has recently completed zio patch.  - Continue PTA metoprolol 50 mg BID          Diet: NPO per Anesthesia Guidelines for Procedure/Surgery Except for: Meds  Adult Formula Drip Feeding: Continuous Nepro with Carbsteady; Jejunostomy; Goal Rate: 40 ml/hr--okay to begin at goal once new formula arrives on unit.; mL/hr; Medication - Feeding Tube Flush Frequency: At least 15-30 mL water before and after med...    DVT Prophylaxis: Pneumatic Compression Devices  Dong Catheter: Not present  Central Lines: None  Cardiac Monitoring: None  Code Status: Full Code      Disposition Plan     Expected Discharge Date: 09/16/2022        Discharge Comments: Chest tube in place, will need to be removed prior to  discharge        The patient's care was discussed with the Bedside Nurse, Patient and Pumonary transplant Team.    Chidi Fay MD  Hospitalist Service, GOLD TEAM [address] [place] [address]  Securely message with the Vocera Web Console (learn more here)  Text page via [facility] Paging/Directory   Please see signed in provider for up to date coverage information      Clinically Significant Risk Factors Present on Admission                       ______________________________________________________________________    Interval History   No acute events overnight. Reports minimal urine output with diuretics overnight. No fever or chills. No chest pain. States her breathing is similar. Diarrhea has improved with anti-diarrheals. No urinary discomfort.     Data reviewed today: I reviewed all medications, new labs and imaging results over the last 24 hours.    Physical Exam   Vital Signs: Temp: 98.5  F (36.9  C) Temp src: Oral BP: 130/59 Pulse: 89   Resp: 14 SpO2: 93 % O2 Device: Nasal cannula Oxygen Delivery: 1 LPM  Weight: 156 lbs 15.48 oz     Constitutional: awake, tired after procedure, somnolent but awakens to voice  Neck: + JVD  Respiratory: crackles in lower posterior lung fields bilaterally , right chest tube to atrium and wall suction  Cardiovascular: Regular rate and rhythm, normal S1, pronounced s2, systolic murmur 2/6  GI: Normal bowel sounds, soft, non-distended, non-tender, GJ placement  Ext: 2+ pitting edema  Neuropsychiatric: moving all extremities, no focal deficit, holding eye contact  Data   No results found for this or any previous visit (from the past 24 hour(s)).

## 2022-09-15 NOTE — IR NOTE
Patient Name: Sofie Rodriguez  Medical Record Number: [id number]  Today's Date: 9/15/2022    Procedure: Image guided left sided chest tube placement  Proceduralist: Dr. Paul, Dr. Bates  Pathology present: N/A    Procedure Start: 1659  Procedure end: 1714  Sedation medications administered: N/A    Report given to: Cheryl DE LA FUENTE RN  : N/A    Other Notes: Pt arrived to IR room CT2 from . Consent reviewed. Pt denies any questions or concerns regarding procedure. Pt positioned right lateral/left side up and monitored per protocol. Pt tolerated procedure without any noted complications. Left chest site cleansed and dressed per protocol. Pt transferred back to .

## 2022-09-15 NOTE — PROGRESS NOTES
Pulmonary Medicine  Cystic Fibrosis - Lung Transplant Team  Progress Note  09/15/2022       Patient: Sofie Rodriguez  MRN: [id number]  : 1960 (age 62 year old)  Transplant: 2022 (Lung), POD#79  Admission date: 2022    Assessment & Plan:     Sofie Rodriguez is a 62-year-old female s/p recent BSLT (22) for COPD complicated by persistent bilateral pleural effusions, persistent CO2 retention, right hemidiaphragm palsy, left radial artery thrombosis (2/2 arterial line) s/p thrombectomy, BACILIO, C. diff colitis, gastroparesis s/p GJ tube placement (22), GI bleed 2/2 pyloric ulcer, hemobilia s/p ERCP and MRCP (2022), and persistent vasoplegia.  Pre-transplant history also significant for HTN, HFpEF, NTM colonization, hepatitis C, and previous methamphetamine use.  Patient admitted 22 with persistent dyspnea (increased with activity), daily morning hemoptysis, and increased BLE edema.  Also noted to have persistent, increased bilateral pleural effusions, diffuse bilateral groundglass changes, and more focal appearing LLL infiltrates on imaging.  Treated with aggressive diuresis with some improvement in symptoms and hypoxia.  Bronchoscopy () grossly normal, studies sent.  Also s/p pigtail chest tube for right pleural effusion and aspiration of left pleural effusion ().  Hemoptysis resolved, mild hypoxia and ANAYA persisting.     Today's recommendations:  - Increase diuresis as able to address increased pulmonary edema on CXR today (Lasix +/- Bumex), persistent pleural effusions, and persistent hypoxia with ANAYA  - Following pending cultures and cytology, defer ABX at this time pending workup  - Considering high dose steroids if pulmonary symptoms not improved with diuresis, defer again today  - Right chest tube to suction, defer lytics in favor of diuresis given stable imaging, reevaluate daily  - IR consult to evaluate for drainage of left pleural effusion   - Tacro level ordered  9/16  - Prednisone taper today  - VGCV for CMV ppx through 9/28  - DSA repeated STAT, pending  - Goal to titrate to antidiarrheal 3 stools daily, still waiting for enteric panel to be sent (originally ordered 9/12)     S/p bilateral sequential lung transplant (BSLT) for end stage COPD:  Acute hypoxia with chronic hypercapnia:   Pulmonary edema:  Persistent bibasilar pleural effusions:   Right hemidiaphragm palsy:  Admitted with with increased shortness of breath, dyspnea with minimal exertion and small volume daily hemoptysis.  Also with marked decline in PFTs (FEV1 1.22L, 50% on 8/18 to 0.98L, 40% on 9/7).  Chest CT (9/8) with increased effusions and groundglass changes.  DSA positive but stable (as below).  BNP markedly elevated (30k) and also with increased BLE edema.  Etiology unclear and is likely multi-factorial with DDx including pulmonary edema from hypervolemia, rejection (ACR +/- AMR), alveolar hemorrhage, and/or infection.  Aggressively diuresed with some improvement in symptoms, small volume morning hemoptysis persists.  CXR (9/12) with stable diffuse mixed opacities and small bibasilar effusions (right effusion previously noted to be more posterior per CT  film from 9/8, less visible on 1-view CXR).  Bronchoscopy (9/13) unremarkable, BAL of lingula sent.  S/p right pigtail chest tube placement (9/13), transudative with moderate output initially but quickly decreasing.  S/p aspiration of left pleural effusion (9/13), CXR today with persistent left lateral loculated pleural effusion (personally reviewed).  Most recent hemoptysis ~9/12, continues on 1L NC with notable dyspnea with ambulation.  - Increase diuresis as able to address increased pulmonary edema on CXR today, persistent pleural effusions, and persistent hypoxia with ANAYA  - BAL studies (9/13) with GPC, following cytology  - Left pleural culture (9/13) NGTD, following cytology (added on today)  - Right pleural studies (9/13) pending, cell  count lymph predominant, transudative, following cytology  - Defer ABX at this time (bronch culture 75% monocytes, right pleural study lymph predominant 39%) pending ID workup  - Considering high dose steroids if pulmonary symptoms not improved with diuresis, defer today  - Supplemental O2 to keep >92%  - IS and Aerobika q1h w/a  - Repeat CXR 2- view daily, today with no change in perihilar opacities and small left lateral pleural effusion (personally reviewed)  - Right chest tube to suction, defer lytics in favor of diuresis given stable imaging, reevaluate daily  - IR consult to evaluate for drainage of left pleural effusion      Immunosuppression:  - Tacrolimus 3.5 mg BID.  Goal level 8-12.  Repeat level 9/16 (ordered).  -  mg BID  - Prednisone 10 mg BID with next taper due 9/15 (ordered)     Prophylaxis:   - Bactrim for PJP ppx qMWF (resumed 9/13, previously stopped d/t hyperkalemia, dapsone stopped d/t anemia, Pentamidine neb not tolerated on 9/7 after only 5 minutes d/t excessive coughing)  - VGCV for CMV ppx through 9/28, D+/R+, recent CMV negative 9/7     Positive DSA: Newly positive on 8/10 with DQB2 mfi 2155.  Most recent DSA on 9/7 with decreased DQB5 from prior (7033-->5744).  - DSA repeated STAT (ordered)     EBV viremia: Low level (1374 on 9/7), not likely to be clinically significant.  - Following monthly as OP     Other relevant problems being managed by the primary team:     Diarrhea: Etiology unclear.  C diff negative on 9/10.  May be medication related (MMF), but unable to transition to Myfortic given NPO and reluctant to switch to azathioprine until etiology of dyspnea is clarified.  Loperamide 2 mg TID started 9/12 after 10 loose stools reported the day prior, then with no stool overnight 9/12 so held.  Goal to titrate to antidiarrheal 3 stools daily, management per primary.  - Still waiting for enteric panel to be sent (ordered 9/12)     BACILIO: Patient required a therapeutic dose of  tacrolimus.  Continue efforts at diuresis  Avoid other nephrotoxins.      We appreciate the excellent care provided by the [place] [id number] team.  Recommendations communicated via in person rounding and this note.  Will continue to follow along closely, please do not hesitate to call with any questions or concerns.     Patient discussed with Dr. Gong.    Catherine Johnson, DNP, APRN, CNP  Inpatient Nurse Practitioner  Pulmonary CF/Transplant     Subjective & Interval History:     Remains on 1L NC with infrequent cough, no hemoptysis.  Reporting dyspnea with ambulation, increased from baseline.  CT drainage continues, moderate output with minimal effusion on imaging, pain around site improved but still increases with activity.  Loose stools stable at ~3 yesterday per pt.    Review of Systems:     C: No fever, no chills  INTEGUMENTARY/SKIN: No rash or obvious new lesions  ENT/MOUTH: No sore throat, no sinus pain, no nasal congestion or drainage  RESP: See interval history  CV: No chest pain, no palpitations  GI: No nausea, no vomiting, no reflux symptoms  : No dysuria  MUSCULOSKELETAL: See interval history  ENDOCRINE: Blood sugars intermittently elevated  NEURO: No headache, no numbness or tingling  PSYCHIATRIC: Mood stable    Physical Exam:     All notes, images, and labs from past 24 hours (at minimum) were reviewed.    Vital signs:  Temp: 98.5  F (36.9  C) Temp src: Oral BP: 130/59 Pulse: 89   Resp: 14 SpO2: 93 % O2 Device: Nasal cannula Oxygen Delivery: 1 LPM   Weight: 71.2 kg (156 lb 15.5 oz)  I/O:     Intake/Output Summary (Last 24 hours) at 9/15/2022 0730  Last data filed at 9/15/2022 0700  Gross per 24 hour   Intake 1480 ml   Output 1750 ml   Net -270 ml     Constitutional: Lying in bed, in no apparent distress.   HEENT: Eyes with pink conjunctivae, anicteric.  Oral mucosa moist without lesions.    PULM: Mildly diminished air flow bilaterally.  Mild diffuse crackles, no rhonchi, no wheezes.  Non-labored  breathing on 1L NC.  Chest tube without air leak.  CV: Normal S1 and S2.  RRR.  No murmur, gallop, or rub.  31+ BLE edema (no compression hose in place).   ABD: NABS, soft, nontender, nondistended.  PEG/J tube site cdi.  MSK: Moves all extremities.     NEURO: Alert and oriented, conversant.   SKIN: Warm, dry.  No rash on limited exam.   PSYCH: Mood stable.     Lines, Drains, and Devices:  Peripheral IV 09/10/22 Anterior;Left Lower forearm (Active)   Site Assessment WDL 09/15/22 0500   Line Status Saline locked 09/15/22 0500   Dressing Intervention New dressing  09/10/22 0135   Phlebitis Scale 0-->no symptoms 09/15/22 0500   Infiltration Scale 0 09/15/22 0500   Number of days: 5     Data:     LABS    CMP:   Recent Labs   Lab 09/15/22  0503 09/15/22  0025 09/15/22  0024 09/14/22 2012 09/14/22  1641 09/14/22  1559 09/14/22  0814 09/14/22  0626 09/13/22  0809 09/13/22  0624 09/12/22  0924 09/12/22  0851 09/11/22  1805 09/11/22  1732 09/10/22  0814 09/10/22  0712   NA  --   --   --   --   --   --   --  141  --  143  --  144  --  144   < > 144   POTASSIUM  --   --  5.0  --  5.7*  --   --  4.1  --  4.1  --  3.6  --  3.8   < > 4.0   CHLORIDE  --   --   --   --   --   --   --  92*  --  93*  --  95*  --  94*   < > 95*   CO2  --   --   --   --   --   --   --  45*  --  47*  --  46*  --  48*   < > 48*   ANIONGAP  --   --   --   --   --   --   --  4*  --  3*  --  3*  --  2*   < > 1*   * 200*  --  168*  --  211*   < > 219*   < > 145*   < > 162*   < > 178*   < > 184*   BUN  --   --   --   --   --   --   --  84.5*  --  74.6*  --  74.4*  --  76.0*   < > 76.5*   CR  --   --   --   --   --   --   --  1.57*  --  1.38*  --  1.42*  --  1.53*   < > 1.45*   GFRESTIMATED  --   --   --   --   --   --   --  37*  --  44*  --  42*  --  39*   < > 41*   ESTUARDO  --   --   --   --   --   --   --  9.1  --  9.2  --  9.2  --  9.2   < > 9.1   MAG  --   --   --   --   --   --   --  2.3  --   --   --   --   --   --   --  2.3   PHOS  --   --   --   --    --   --   --  3.5  --   --   --   --   --  2.3*  --  3.1   PROTTOTAL  --   --   --   --   --   --   --   --   --  5.6*  --   --   --   --   --  5.5*   ALBUMIN  --   --   --   --   --   --   --   --   --   --   --   --   --  3.3*  --  3.1*   BILITOTAL  --   --   --   --   --   --   --   --   --   --   --   --   --   --   --  0.2   ALKPHOS  --   --   --   --   --   --   --   --   --   --   --   --   --   --   --  88   AST  --   --   --   --   --   --   --   --   --   --   --   --   --   --   --  17   ALT  --   --   --   --   --   --   --   --   --   --   --   --   --   --   --  11    < > = values in this interval not displayed.     CBC:   Recent Labs   Lab 09/14/22 0626 09/13/22 0624 09/12/22  0851 09/11/22  1024   WBC 7.4 7.9 8.0 9.3   RBC 2.52* 2.45* 2.52* 2.60*   HGB 7.8* 7.5* 7.6* 7.8*   HCT 27.0* 25.9* 26.8* 26.9*   * 106* 106* 104*   MCH 31.0 30.6 30.2 30.0   MCHC 28.9* 29.0* 28.4* 29.0*   RDW 19.4* 19.4* 19.6* 19.5*    300 276 282       INR: No lab results found in last 7 days.    Glucose:   Recent Labs   Lab 09/15/22  0503 09/15/22  0025 09/14/22 2012 09/14/22  1559 09/14/22  1220 09/14/22  0814   * 200* 168* 211* 175* 200*       Blood Gas:   Recent Labs   Lab 09/14/22 0626 09/13/22 0624 09/12/22  0851   PHV 7.38 7.36 7.36   PCO2V 77* 82* 81*   PO2V 58* 109* 45   HCO3V 45* 47* 46*   LINDY 18.7* 17.9* 15.5*   O2PER 20 30 2       Culture Data No results for input(s): CULT in the last 168 hours.    Virology Data:   Lab Results   Component Value Date    FLUAH1 Not Detected 09/13/2022    FLUAH3 Not Detected 09/13/2022    YQ7500 Not Detected 09/13/2022    IFLUB Not Detected 09/13/2022    RSVA Not Detected 09/13/2022    RSVB Not Detected 09/13/2022    PIV1 Not Detected 09/13/2022    PIV2 Not Detected 09/13/2022    PIV3 Not Detected 09/13/2022    HMPV Not Detected 09/13/2022       Historical CMV results (last 3 of prior testing):  Lab Results   Component Value Date    CMVQNT Not Detected  09/13/2022    CMVQNT Not Detected 09/07/2022    CMVQNT Not Detected 09/01/2022     No results found for: CMVLOG    Urine Studies    Recent Labs   Lab Test 08/01/22  0319 07/08/22  0831 07/05/22  1004   URINEPH 8.0* 5.5 5.5   NITRITE Negative Negative Negative   LEUKEST Negative Negative Negative   WBCU  --  1 5       Most Recent [place] Pulmonary Function Testing (FVC/FEV1 only)  FVC-Pre   Date Value Ref Range Status   09/07/2022 1.01 L    09/01/2022 1.07 L    08/24/2022 1.23 L    08/18/2022 1.33 L      FVC-%Pred-Pre   Date Value Ref Range Status   09/07/2022 33 %    09/01/2022 35 %    08/24/2022 40 %    08/18/2022 43 %      FEV1-Pre   Date Value Ref Range Status   09/07/2022 0.98 L    09/01/2022 1.02 L    08/24/2022 1.17 L    08/18/2022 1.22 L      FEV1-%Pred-Pre   Date Value Ref Range Status   09/07/2022 40 %    09/01/2022 42 %    08/24/2022 48 %    08/18/2022 50 %        IMAGING    Recent Results (from the past 48 hour(s))   XR Chest Port 1 View    Narrative    EXAM: XR CHEST PORT 1 VIEW  9/13/2022 10:52 AM     HISTORY:  eval Pneumo post chest tube placement       COMPARISON:  9/12/2022    FINDINGS:   AP portable view of the chest. Postoperative changes of bilateral lung  transplant with clamshell sternotomy wires. Interval placement of a  right basilar chest tube. Midline trachea. Cardiomediastinal  silhouette is stable. No appreciable pneumothorax. Unchanged bilateral  loculated pleural effusions. Similar bibasilar and perihilar mixed  interstitial and airspace opacities. Visualized upper abdomen is  unremarkable.      Impression    IMPRESSION:   1. Interval placement of a right basilar chest tube. No appreciable  pneumothorax.  2. Stable bilateral loculated pleural effusions.  3. Similar mixed interstitial and pulmonary opacities, likely  pulmonary edema and atelectasis.    I have personally reviewed the examination and initial interpretation  and I agree with the findings.    JOHANN MORAES MD         SYSTEM  ID:  [id number]   XR Chest Port 1 View    Narrative    EXAM: XR CHEST PORT 1 VIEW  9/13/2022 2:57 PM     HISTORY:  new air leak following right chest tube placement       COMPARISON:  Same day chest radiograph    FINDINGS:   AP portable view of the chest. Midline trachea. Cardiomediastinal  silhouette is stable. Surgical changes of bilateral lung transplant  with clamshell sternotomy wires. Stable positioning of a right basilar  chest tube. Similar appearance of the loculated left pleural effusion  and mildly increased apical component of the loculated right pleural  effusion. No definite pneumothorax. Similar diffuse mixed interstitial  and airspace opacities. Visualized upper abdomen unremarkable.      Impression    IMPRESSION:   1. No appreciable pneumothorax with right-sided chest tube in place.  2. Similar appearance of the loculated left pleural effusion with  increased apical component of the loculated right pleural effusion.  3. Similar bilateral mixed interstitial and airspace opacities.    I have personally reviewed the examination and initial interpretation  and I agree with the findings.    GEORGE ROGERS MD         SYSTEM ID:  [id number]   XR Chest 2 Views    Narrative    EXAMINATION: XR CHEST 2 VIEWS, 9/14/2022 10:00 AM    COMPARISON: 9/13/2022    HISTORY: interval follow up, lung transplant, right chest tube    FINDINGS: Cardiac silhouette is enlarged and unchanged, partially  obscured. Right chest tube in the lung bases remains in place. Small  right pleural effusion is unchanged. Small to moderate-sized left  pleural effusion is also unchanged. Mixed perihilar airspace and  interstitial opacities overall slightly increased.      Impression    IMPRESSION:   1. Overall increased mixed airspace and interstitial opacities in the  lungs. This could be due to edema or infection.  2. Continued left greater than right pleural effusions, not  substantially changed.    GEORGE ROGERS MD         SYSTEM ID:   [id number]   XR Abdomen 2 Views    Narrative    EXAM: XR abdomen PORT 2 VIEW  9/13/2022 2:57 PM     HISTORY:  new air leak following right chest tube placement       COMPARISON:  Abdominal radiograph 8/19/2022. Tube placement  fluoroscopy images 8/31/2022.    FINDINGS:   AP supine and upright portable view of the abdomen.  Gastrojejunostomy tube in place extending through the duodenum with  tip overlying the mid left abdomen on supine imaging and right-sided  abdomen on upright. Tip may have slightly retracted from prior  placement fluoroscopy images where this was extending into the right  lower quadrant small bowel loops although this appears to move  positionally between upright and supine imaging.  Common bile duct stent and pancreatic duct stents in place.  Visualized upper abdomen unremarkable. The bowel gas pattern is  nonobstructive. No visualized pneumatosis or portal venous gas.   Chest findings better evaluated on chest x-ray from same day.      Impression    IMPRESSION:   Percutaneous gastrojejunostomy tube in place with tip terminating  within the proximal jejunum. This may be slightly retracted from  fluoroscopy placement images on supine view although appears fairly  similar in position on the upright image.    I have personally reviewed the examination and initial interpretation  and I agree with the findings.    YANNICK BENAVIDEZ MD         SYSTEM ID:  [id number]

## 2022-09-15 NOTE — CONSULTS
Interventional Radiology Consult Service Note    Patient is on IR schedule tentatively 9/15 for a CT guided left sided chest tube, send dx labs, 14F with stopcock.   Platelets WNLs for procedure, INR pending. COVID neg.    No NPO required.  Consent will be done prior to procedure.     [place] [phone] for estimated time of procedure. Pt may be moved to 9/16 depending on IR schedule.    Case discussed with Dr. Fay and Dr. Paul. This is a 62 year old female with pertinent hx of end stage COPD s/p bilateral sequential lung transplant (6/22) on triple IS ( MMF/Tacro/pred), pyloric ulcer, recent ERCP c/f hemobilia, gastroparesis s/p GJ tube placement and Percutaneous J tube presents for a planned admission from transplant pulmonology to work up antibody medicated rejection versus infection. Pt with bilateral complex pleural effusions since transplant. IP placed right sided chest tube 9/13 and attempted left sided thoracentesis (12mLs out). IR is now consulted for consideration of left sided chest tube placement. Transplant understands the collection is loculated and will likely require TPA to ensure adequate drainage. Will proceed with CT guided left chest tube placement into posteromedial loculation.     Dx labs per Dr. Fay.        Expected date of discharge: TBD    Vitals:   /66 (BP Location: Left arm)   Pulse 82   Temp 97.7  F (36.5  C) (Oral)   Resp 16   Wt 71.2 kg (156 lb 15.5 oz)   SpO2 98%   BMI 27.81 kg/m      Pertinent Labs:     Lab Results   Component Value Date    WBC 6.9 09/15/2022    WBC 7.4 09/14/2022    WBC 7.9 09/13/2022    WBC 5.8 06/30/2021    WBC 5.2 06/14/2021       Lab Results   Component Value Date    HGB 7.9 09/15/2022    HGB 7.8 09/14/2022    HGB 7.5 09/13/2022    HGB 11.8 06/30/2021    HGB 12.9 06/30/2021    HGB 12.5 06/14/2021       Lab Results   Component Value Date     09/15/2022     09/14/2022     09/13/2022      06/30/2021     06/14/2021       Lab Results   Component Value Date    INR 0.93 08/12/2022    INR 0.93 06/14/2021    PTT 25 08/12/2022    PTT 28 06/14/2021       Lab Results   Component Value Date    POTASSIUM 4.7 09/15/2022    POTASSIUM 3.6 08/29/2022    POTASSIUM 5.2 07/06/2022    POTASSIUM 4.4 06/30/2021        RUFUS Waters CNP  Interventional Radiology  Pager: [phone]

## 2022-09-15 NOTE — PLAN OF CARE
Goal Outcome Evaluation:     SR. 1L O2 via NC. VSS. ANAYA. TF at goal of 40ml/hr via j tube. NPO. Diuresing with IV Lasix and IV Bumex, see I/O. No BM this shift. CT x1 on right, plan for additional CT placement with IR this afternoon. Continue to monitor and notify team of changes.

## 2022-09-15 NOTE — PROCEDURES
[facility]    Procedure: Left chest tube placement    Date/Time: 9/15/2022 5:16 PM  Performed by: Greg Bates  Authorized by: Greg Bates Fellow Physician: Paulo  Other(s) attending procedure: Beverly      UNIVERSAL PROTOCOL   Site Marked: NA  Prior Images Obtained and Reviewed:  Yes  Required items: Required blood products, implants, devices and special equipment available    Patient identity confirmed:  Verbally with patient, arm band, provided demographic data and hospital-assigned identification number  Patient was reevaluated immediately before administering moderate or deep sedation or anesthesia  Confirmation Checklist:  Patient's identity using two indicators, relevant allergies, procedure was appropriate and matched the consent or emergent situation and correct equipment/implants were available  Time out: Immediately prior to the procedure a time out was called    Universal Protocol: the Joint Commission Universal Protocol was followed    Preparation: Patient was prepped and draped in usual sterile fashion    ESBL (mL):  2     ANESTHESIA    Anesthesia: Local infiltration  Local Anesthetic:  Lidocaine 1% without epinephrine  Anesthetic Total (mL):  10      SEDATION  Patient Sedated: Yes    Sedation Type:  Moderate (conscious) sedation  Sedation:  Fentanyl and midazolam  Vital signs: Vital signs monitored during sedation    See dictated procedure note for full details.  Findings: Serous/hematogenous fluid.    Specimens: fluid and/or tissue for laboratory analysis and culture    Complications: None    Condition: Stable    Plan: Chest tube to waterseal.      PROCEDURE  Describe Procedure: Successful placement of 14 [place] chest tube in the left chest fluid collection with three-way stopcock for lytics.  Patient Tolerance:  Patient tolerated the procedure well with no immediate complications  Length of time physician/provider present for 1:1 monitoring  during sedation: 15

## 2022-09-16 ENCOUNTER — APPOINTMENT (OUTPATIENT)
Dept: GENERAL RADIOLOGY | Facility: CLINIC | Age: 62
DRG: 205 | End: 2022-09-16
Attending: PHYSICIAN ASSISTANT
Payer: MEDICARE

## 2022-09-16 LAB
ALBUMIN SERPL BCG-MCNC: 3.1 G/DL (ref 3.5–5.2)
ALP SERPL-CCNC: 85 U/L (ref 35–104)
ALT SERPL W P-5'-P-CCNC: <5 U/L (ref 10–35)
ANION GAP SERPL CALCULATED.3IONS-SCNC: 5 MMOL/L (ref 7–15)
AST SERPL W P-5'-P-CCNC: 21 U/L (ref 10–35)
BILIRUB DIRECT SERPL-MCNC: <0.2 MG/DL (ref 0–0.3)
BILIRUB SERPL-MCNC: 0.2 MG/DL
BUN SERPL-MCNC: 100 MG/DL (ref 8–23)
C COLI+JEJUNI+LARI FUSA STL QL NAA+PROBE: NOT DETECTED
CALCIUM SERPL-MCNC: 9.5 MG/DL (ref 8.8–10.2)
CHLORIDE SERPL-SCNC: 92 MMOL/L (ref 98–107)
CHYLO FLD QL: NORMAL
CREAT SERPL-MCNC: 1.83 MG/DL (ref 0.51–0.95)
DEPRECATED HCO3 PLAS-SCNC: 43 MMOL/L (ref 22–29)
EC STX1 GENE STL QL NAA+PROBE: NOT DETECTED
EC STX2 GENE STL QL NAA+PROBE: NOT DETECTED
ERYTHROCYTE [DISTWIDTH] IN BLOOD BY AUTOMATED COUNT: 19.8 % (ref 10–15)
GFR SERPL CREATININE-BSD FRML MDRD: 31 ML/MIN/1.73M2
GLUCOSE BLDC GLUCOMTR-MCNC: 160 MG/DL (ref 70–99)
GLUCOSE BLDC GLUCOMTR-MCNC: 168 MG/DL (ref 70–99)
GLUCOSE BLDC GLUCOMTR-MCNC: 174 MG/DL (ref 70–99)
GLUCOSE BLDC GLUCOMTR-MCNC: 184 MG/DL (ref 70–99)
GLUCOSE BLDC GLUCOMTR-MCNC: 188 MG/DL (ref 70–99)
GLUCOSE SERPL-MCNC: 160 MG/DL (ref 70–99)
HCT VFR BLD AUTO: 29.1 % (ref 35–47)
HGB BLD-MCNC: 8.1 G/DL (ref 11.7–15.7)
MAGNESIUM SERPL-MCNC: 2.4 MG/DL (ref 1.7–2.3)
MCH RBC QN AUTO: 30.1 PG (ref 26.5–33)
MCHC RBC AUTO-ENTMCNC: 27.8 G/DL (ref 31.5–36.5)
MCV RBC AUTO: 108 FL (ref 78–100)
NOROV GI+II ORF1-ORF2 JNC STL QL NAA+PR: NOT DETECTED
PHOSPHATE SERPL-MCNC: 3.5 MG/DL (ref 2.5–4.5)
PLATELET # BLD AUTO: 224 10E3/UL (ref 150–450)
POTASSIUM SERPL-SCNC: 4.7 MMOL/L (ref 3.4–5.3)
PROT SERPL-MCNC: 5.9 G/DL (ref 6.4–8.3)
RBC # BLD AUTO: 2.69 10E6/UL (ref 3.8–5.2)
RVA NSP5 STL QL NAA+PROBE: NOT DETECTED
SALMONELLA SP RPOD STL QL NAA+PROBE: NOT DETECTED
SHIGELLA SP+EIEC IPAH STL QL NAA+PROBE: NOT DETECTED
SODIUM SERPL-SCNC: 140 MMOL/L (ref 136–145)
TACROLIMUS BLD-MCNC: 6 UG/L (ref 5–15)
TME LAST DOSE: NORMAL H
TME LAST DOSE: NORMAL H
V CHOL+PARA RFBL+TRKH+TNAA STL QL NAA+PR: NOT DETECTED
WBC # BLD AUTO: 6.9 10E3/UL (ref 4–11)
Y ENTERO RECN STL QL NAA+PROBE: NOT DETECTED

## 2022-09-16 PROCEDURE — 250N000013 HC RX MED GY IP 250 OP 250 PS 637: Performed by: PEDIATRICS

## 2022-09-16 PROCEDURE — 80053 COMPREHEN METABOLIC PANEL: CPT | Performed by: INTERNAL MEDICINE

## 2022-09-16 PROCEDURE — 84100 ASSAY OF PHOSPHORUS: CPT | Performed by: PHYSICIAN ASSISTANT

## 2022-09-16 PROCEDURE — 250N000013 HC RX MED GY IP 250 OP 250 PS 637: Performed by: STUDENT IN AN ORGANIZED HEALTH CARE EDUCATION/TRAINING PROGRAM

## 2022-09-16 PROCEDURE — 250N000009 HC RX 250: Performed by: STUDENT IN AN ORGANIZED HEALTH CARE EDUCATION/TRAINING PROGRAM

## 2022-09-16 PROCEDURE — 36415 COLL VENOUS BLD VENIPUNCTURE: CPT | Performed by: NURSE PRACTITIONER

## 2022-09-16 PROCEDURE — 250N000012 HC RX MED GY IP 250 OP 636 PS 637

## 2022-09-16 PROCEDURE — 250N000012 HC RX MED GY IP 250 OP 636 PS 637: Performed by: PHYSICIAN ASSISTANT

## 2022-09-16 PROCEDURE — 250N000013 HC RX MED GY IP 250 OP 250 PS 637: Performed by: NURSE PRACTITIONER

## 2022-09-16 PROCEDURE — 99233 SBSQ HOSP IP/OBS HIGH 50: CPT | Performed by: STUDENT IN AN ORGANIZED HEALTH CARE EDUCATION/TRAINING PROGRAM

## 2022-09-16 PROCEDURE — 250N000012 HC RX MED GY IP 250 OP 636 PS 637: Performed by: INTERNAL MEDICINE

## 2022-09-16 PROCEDURE — 85027 COMPLETE CBC AUTOMATED: CPT | Performed by: INTERNAL MEDICINE

## 2022-09-16 PROCEDURE — 71046 X-RAY EXAM CHEST 2 VIEWS: CPT | Mod: 26 | Performed by: RADIOLOGY

## 2022-09-16 PROCEDURE — 99233 SBSQ HOSP IP/OBS HIGH 50: CPT | Mod: 24 | Performed by: PHYSICIAN ASSISTANT

## 2022-09-16 PROCEDURE — 87506 IADNA-DNA/RNA PROBE TQ 6-11: CPT | Performed by: NURSE PRACTITIONER

## 2022-09-16 PROCEDURE — 82248 BILIRUBIN DIRECT: CPT | Performed by: PHYSICIAN ASSISTANT

## 2022-09-16 PROCEDURE — 83735 ASSAY OF MAGNESIUM: CPT | Performed by: PHYSICIAN ASSISTANT

## 2022-09-16 PROCEDURE — 250N000013 HC RX MED GY IP 250 OP 250 PS 637

## 2022-09-16 PROCEDURE — 214N000001 HC R&B CCU UMMC

## 2022-09-16 PROCEDURE — 258N000003 HC RX IP 258 OP 636: Performed by: STUDENT IN AN ORGANIZED HEALTH CARE EDUCATION/TRAINING PROGRAM

## 2022-09-16 PROCEDURE — 80197 ASSAY OF TACROLIMUS: CPT | Performed by: NURSE PRACTITIONER

## 2022-09-16 PROCEDURE — 71046 X-RAY EXAM CHEST 2 VIEWS: CPT

## 2022-09-16 PROCEDURE — 250N000011 HC RX IP 250 OP 636: Performed by: STUDENT IN AN ORGANIZED HEALTH CARE EDUCATION/TRAINING PROGRAM

## 2022-09-16 RX ORDER — OXYCODONE HCL 5 MG/5 ML
5 SOLUTION, ORAL ORAL EVERY 4 HOURS PRN
Status: DISCONTINUED | OUTPATIENT
Start: 2022-09-16 | End: 2022-09-26

## 2022-09-16 RX ORDER — MULTIPLE VITAMINS W/ MINERALS TAB 9MG-400MCG
1 TAB ORAL DAILY
Status: DISCONTINUED | OUTPATIENT
Start: 2022-09-16 | End: 2022-09-23

## 2022-09-16 RX ORDER — UREA 10 %
500 LOTION (ML) TOPICAL DAILY
Status: DISCONTINUED | OUTPATIENT
Start: 2022-09-17 | End: 2022-10-08 | Stop reason: HOSPADM

## 2022-09-16 RX ORDER — BUMETANIDE 0.25 MG/ML
4 INJECTION INTRAMUSCULAR; INTRAVENOUS ONCE
Status: COMPLETED | OUTPATIENT
Start: 2022-09-16 | End: 2022-09-16

## 2022-09-16 RX ORDER — OXYCODONE HCL 5 MG/5 ML
5 SOLUTION, ORAL ORAL ONCE
Status: COMPLETED | OUTPATIENT
Start: 2022-09-16 | End: 2022-09-16

## 2022-09-16 RX ORDER — B COMPLEX C NO.10/FOLIC ACID 900MCG/5ML
5 LIQUID (ML) ORAL DAILY
Status: DISCONTINUED | OUTPATIENT
Start: 2022-09-16 | End: 2022-09-16

## 2022-09-16 RX ORDER — GABAPENTIN 100 MG/1
200 CAPSULE ORAL 3 TIMES DAILY
Status: DISCONTINUED | OUTPATIENT
Start: 2022-09-16 | End: 2022-09-22

## 2022-09-16 RX ADMIN — VALGANCICLOVIR HYDROCHLORIDE 450 MG: 50 POWDER, FOR SOLUTION ORAL at 10:24

## 2022-09-16 RX ADMIN — CALCIUM CARBONATE 600 MG (1,500 MG)-VITAMIN D3 400 UNIT TABLET 1 TABLET: at 17:55

## 2022-09-16 RX ADMIN — NYSTATIN 1000000 UNITS: 100000 SUSPENSION ORAL at 17:55

## 2022-09-16 RX ADMIN — HEPARIN SODIUM 5000 UNITS: 5000 INJECTION, SOLUTION INTRAVENOUS; SUBCUTANEOUS at 20:26

## 2022-09-16 RX ADMIN — ACETAZOLAMIDE 125 MG: 125 TABLET ORAL at 08:22

## 2022-09-16 RX ADMIN — HEPARIN SODIUM 5000 UNITS: 5000 INJECTION, SOLUTION INTRAVENOUS; SUBCUTANEOUS at 08:22

## 2022-09-16 RX ADMIN — MYCOPHENOLATE MOFETIL 750 MG: 200 POWDER, FOR SUSPENSION ORAL at 08:22

## 2022-09-16 RX ADMIN — INSULIN ASPART 1 UNITS: 100 INJECTION, SOLUTION INTRAVENOUS; SUBCUTANEOUS at 08:40

## 2022-09-16 RX ADMIN — ACETAMINOPHEN 975 MG: 325 TABLET, FILM COATED ORAL at 20:26

## 2022-09-16 RX ADMIN — INSULIN ASPART 1 UNITS: 100 INJECTION, SOLUTION INTRAVENOUS; SUBCUTANEOUS at 15:37

## 2022-09-16 RX ADMIN — INSULIN ASPART 1 UNITS: 100 INJECTION, SOLUTION INTRAVENOUS; SUBCUTANEOUS at 00:30

## 2022-09-16 RX ADMIN — LOPERAMIDE HYDROCHLORIDE 2 MG: 2 CAPSULE ORAL at 14:05

## 2022-09-16 RX ADMIN — PREDNISONE 10 MG: 10 TABLET ORAL at 08:22

## 2022-09-16 RX ADMIN — GABAPENTIN 200 MG: 100 CAPSULE ORAL at 14:05

## 2022-09-16 RX ADMIN — MYCOPHENOLATE MOFETIL 750 MG: 200 POWDER, FOR SUSPENSION ORAL at 20:26

## 2022-09-16 RX ADMIN — METOPROLOL TARTRATE 50 MG: 50 TABLET, FILM COATED ORAL at 20:27

## 2022-09-16 RX ADMIN — ACETAMINOPHEN 975 MG: 325 TABLET, FILM COATED ORAL at 08:22

## 2022-09-16 RX ADMIN — PREDNISONE 7.5 MG: 2.5 TABLET ORAL at 20:26

## 2022-09-16 RX ADMIN — CALCIUM CARBONATE 600 MG (1,500 MG)-VITAMIN D3 400 UNIT TABLET 1 TABLET: at 08:22

## 2022-09-16 RX ADMIN — TACROLIMUS 4 MG: 5 CAPSULE ORAL at 17:55

## 2022-09-16 RX ADMIN — Medication 40 MG: at 20:26

## 2022-09-16 RX ADMIN — Medication 40 MG: at 08:22

## 2022-09-16 RX ADMIN — INSULIN ASPART 1 UNITS: 100 INJECTION, SOLUTION INTRAVENOUS; SUBCUTANEOUS at 04:43

## 2022-09-16 RX ADMIN — NYSTATIN 1000000 UNITS: 100000 SUSPENSION ORAL at 08:22

## 2022-09-16 RX ADMIN — NYSTATIN 1000000 UNITS: 100000 SUSPENSION ORAL at 20:25

## 2022-09-16 RX ADMIN — FOLIC ACID 1 MG: 1 TABLET ORAL at 08:22

## 2022-09-16 RX ADMIN — BUMETANIDE 4 MG: 0.25 INJECTION, SOLUTION INTRAMUSCULAR; INTRAVENOUS at 12:52

## 2022-09-16 RX ADMIN — NYSTATIN 1000000 UNITS: 100000 SUSPENSION ORAL at 12:52

## 2022-09-16 RX ADMIN — ACETAZOLAMIDE 125 MG: 125 TABLET ORAL at 15:45

## 2022-09-16 RX ADMIN — METOPROLOL TARTRATE 50 MG: 50 TABLET, FILM COATED ORAL at 08:22

## 2022-09-16 RX ADMIN — Medication 1 TABLET: at 12:52

## 2022-09-16 RX ADMIN — GABAPENTIN 200 MG: 100 CAPSULE ORAL at 20:26

## 2022-09-16 RX ADMIN — TACROLIMUS 3.5 MG: 5 CAPSULE ORAL at 08:22

## 2022-09-16 RX ADMIN — OXYCODONE HYDROCHLORIDE 5 MG: 5 SOLUTION ORAL at 20:25

## 2022-09-16 RX ADMIN — OXYCODONE HYDROCHLORIDE 5 MG: 5 SOLUTION ORAL at 13:41

## 2022-09-16 RX ADMIN — OXYCODONE HYDROCHLORIDE 5 MG: 5 SOLUTION ORAL at 08:17

## 2022-09-16 RX ADMIN — INSULIN ASPART 1 UNITS: 100 INJECTION, SOLUTION INTRAVENOUS; SUBCUTANEOUS at 22:44

## 2022-09-16 RX ADMIN — ACETAMINOPHEN 975 MG: 325 TABLET, FILM COATED ORAL at 13:41

## 2022-09-16 RX ADMIN — OXYCODONE HYDROCHLORIDE 5 MG: 5 SOLUTION ORAL at 15:45

## 2022-09-16 RX ADMIN — Medication 50 ML: at 15:29

## 2022-09-16 RX ADMIN — GABAPENTIN 200 MG: 100 CAPSULE ORAL at 10:21

## 2022-09-16 ASSESSMENT — ACTIVITIES OF DAILY LIVING (ADL)
ADLS_ACUITY_SCORE: 30

## 2022-09-16 NOTE — PROGRESS NOTES
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 diff colitis, gastroparesis s/p GJ tube placement (22), GI bleed 2/2 pyloric ulcer, hemobilia s/p ERCP and MRCP (2022), and persistent vasoplegia.  Pre-transplant history also significant for HTN, HFpEF, NTM colonization, hepatitis C, and previous methamphetamine use.  Patient admitted 22 with persistent dyspnea (increased with activity), daily morning hemoptysis, and increased BLE edema.  Also noted to have persistent, increased bilateral pleural effusions, diffuse bilateral groundglass changes, and more focal appearing LLL infiltrates on imaging.  Treated with aggressive diuresis with some improvement in symptoms and hypoxia.  Bronchoscopy () grossly normal, studies sent.  Also s/p pigtail chest tube for right pleural effusion and aspiration of left pleural effusion ().  Hemoptysis resolved, mild hypoxia and ANAYA persisting.  Now s/p pigtail chest tube for left pleural effusion (9/15).       Today's recommendations:  - Diuresis again as able given pulmonary edema on CXR, persistent pleural effusions, and persistent hypoxia with ANAYA  - Following pending cultures, defer ABX at this time pending workup  - Considering high dose steroids if pulmonary symptoms not improved with diuresis, defer again today  - CXR daily as below  - Chest tubes to suction, begin lytics via left chest tube BID x6 doses, reevaluate daily  - Tacrolimus level subtherapeutic, dose increased, repeat level ordered  9/19  - Prednisone taper due 10/13 (not yet ordered)  - Revisit plan for PJP ppx  - VGCV for CMV ppx through 9/28  - Repeat DSA ordered 9/21  - Goal to titrate to antidiarrheal 3 stools daily  - Okay for full liquid diet for comfort     S/p bilateral sequential lung transplant (BSLT) for end stage COPD:  Acute hypoxia with chronic hypercapnia:   Pulmonary edema:  Persistent bibasilar pleural effusions:   Right hemidiaphragm palsy:  Admitted with with increased shortness of breath, dyspnea with minimal exertion and small volume daily hemoptysis.  Also with marked decline in PFTs (FEV1 1.22L, 50% on 8/18 to 0.98L, 40% on 9/7).  Chest CT (9/8) with increased effusions and groundglass changes.  DSA positive but stable (as below).  BNP markedly elevated (30k) and also with increased BLE edema.  Etiology unclear and is likely multi-factorial with DDx including pulmonary edema from hypervolemia, rejection (ACR +/- AMR), alveolar hemorrhage, and/or infection.  Aggressively diuresed with some improvement in symptoms.  CXR (9/12) with stable diffuse mixed opacities and small bibasilar effusions (right effusion previously noted to be more posterior per CT  film from 9/8, less visible on 1-view CXR).  Bronchoscopy (9/13) unremarkable, BAL of lingula sent.  S/p right pigtail chest tube placement (9/13), transudative with moderate output initially but quickly decreasing.  S/p aspiration of left pleural effusion (9/13).  Most recent hemoptysis ~9/12, continues on 1L NC with notable dyspnea with ambulation.  Now s/p pigtail chest tube for left pleural effusion (9/15).    - Diuresis again as able given pulmonary edema on CXR, persistent pleural effusions, and persistent hypoxia with ANAYA  - BAL studies (9/13) with GPC  - Left pleural culture (9/13, 9/15) NGTD  - Right pleural studies (9/13) NGTD  - Defer ABX at this time (bronch culture 75% monocytes, right pleural study lymph predominant 39%) pending ID workup  - Considering  high dose steroids if pulmonary symptoms not improved with diuresis, defer today  - Supplemental O2 to keep >92%  - IS and Aerobika q1h w/a  - Repeat CXR 2- view daily, today with similar left > right pleural effusions and no significant change in diffuse mixed interstitial and airspace opacities (personally reviewed)  - Bilateral chest tubes to suction, begin lytics via left chest tube BID x6 doses, reevaluate daily  - Pain management per primary team     Immunosuppression:  - Tacrolimus 3.5 mg BID.  Goal level 8-12.  Level 9/16 subtherapeutic at 6, dose increased to 3.5 mg qAM / 4 mg qPM, repeat level 9/19 (ordered).  -  mg BID  - Prednisone 10 mg qAM / 7.5 mg qPM with next taper due 10/13 (not yet ordered)     Prophylaxis:   - Bactrim for PJP ppx qMWF --> discontinued 9/15 (had resumed 9/14, previously stopped d/t hyperkalemia, dapsone stopped d/t anemia, Pentamidine neb not tolerated on 9/7 after only 5 minutes d/t excessive coughing)  - VGCV for CMV ppx through 9/28, D+/R+, recent CMV negative 9/7     Positive DSA: Newly positive on 8/10 with DQB2 mfi 2155.  Most recent DSA on 9/14 with ~stable DQB5 from prior (5744-->5825, decreased from 7033 on 9/1).  - Repeat DSA 9/21 (ordered)     EBV viremia: Low level (1374 on 9/7), not likely to be clinically significant.  - Following monthly as OP     Other relevant problems being managed by the primary team:     Diarrhea: Etiology unclear.  C diff negative on 9/10.  May be medication related (MMF), but unable to transition to Myfortic given NPO and reluctant to switch to azathioprine until etiology of dyspnea is clarified.  Loperamide 2 mg TID started 9/12 after 10 loose stools reported the day prior, then with no stool overnight 9/12 so held.  Goal to titrate to antidiarrheal 3 stools daily, management per primary.  Enteric stool panel negative 9/16.      Severe gastroparesis:   Pyloric ulcer: CT abdomen 7/15 with moderate to large gastric distention, otherwise  without obstruction.  Gastric emptying study 7/20 with severe gastric emptying delay (95% retention at 4 hours), s/p GJ tube placement in IR 7/27.  S/p EGD 8/3 for coffee ground G tube output, noted to have pyloric ulcer and excessive gastric fluid and residual food.  Hgb drop with dark tarry stools 8/8.  S/p repeat EGD 8/11 with mild erythema 2/2 GJ tube trauma seen near insertion site but no active bleeding.  - PPI BID  - TF continuous and ALL enteral medications via J tube  - G tube to gravity drainage; full liquid diet for comfort only  - Repeat EGD 10/13 to check healing of ulcer, sooner if hgb declines further     BACILIO: Patient required a therapeutic dose of tacrolimus.  Continue efforts at diuresis.  Avoid other nephrotoxins.    We appreciate the excellent care provided by the [place] [id number] team.  Recommendations communicated via in person rounding and this note.  Will continue to follow along closely, please do not hesitate to call with any questions or concerns.    Patient discussed with Dr. Gong.    Yuliet Aviles PA-C  Inpatient [facility]  Pulmonary CF/Transplant     Subjective & Interval History:     Remains on 1L NC with infrequent cough, no hemoptysis.  Ongoing ANAYA.  Right chest tube with decreasing drainage and now s/p left chest tube placement yesterday.  Having pain at both chest tube sites, using prn oxycodone.  Loose stools less frequent x1-2 yesterday.  Occasional nausea with medication administration.  Net negative 820 ml with diuresis yesterday.    Review of Systems:     C: No fever, no chills, + decreased weight  INTEGUMENTARY/SKIN: No rash or obvious new lesions  ENT/MOUTH: No sore throat, no sinus pain, no nasal congestion or drainage  RESP: See interval history  CV: No chest pain, + peripheral edema  GI: No vomiting, no reflux symptoms  : No dysuria  MUSCULOSKELETAL: See interval history  ENDOCRINE: + blood sugars intermittently elevated  NEURO: No headache, no numbness or  tingling  PSYCHIATRIC: Mood stable    Physical Exam:     All notes, images, and labs from past 24 hours (at minimum) were reviewed.    Vital signs:  Temp: 98  F (36.7  C) Temp src: Oral BP: 122/72 Pulse: 69   Resp: 18 SpO2: 100 % O2 Device: Nasal cannula Oxygen Delivery: 1 LPM   Weight: 69.1 kg (152 lb 6.4 oz)  I/O:     Intake/Output Summary (Last 24 hours) at 9/16/2022 1318  Last data filed at 9/16/2022 1300  Gross per 24 hour   Intake 1310 ml   Output 2545 ml   Net -1235 ml     Constitutional: Lying in bed, in no apparent distress.   HEENT: Eyes with pink conjunctivae, anicteric.  Oral mucosa moist without lesions.    PULM: Mildly diminished air flow bilaterally.  Few scattered crackles.  No rhonchi, no wheezes.  Non-labored breathing on 1L NC.  Right chest tube without air leak, left chest tube with some tidaling (with cough).  CV: Normal S1 and S2.  RRR.  + systolic murmur.  No gallop or rub.  2+ BLE edema (compression socks on).   ABD: NABS, soft, nontender, nondistended.  PEG/J tube not visualized.   MSK: Moves all extremities.    NEURO: Alert, conversant.   SKIN: Warm, dry.  No rash on limited exam.   PSYCH: Mood stable.     Lines, Drains, and Devices:  Peripheral IV 09/10/22 Anterior;Left Lower forearm (Active)   Site Assessment WDL 09/16/22 1000   Line Status Saline locked 09/16/22 1000   Dressing Intervention New dressing  09/10/22 0135   Phlebitis Scale 0-->no symptoms 09/16/22 1000   Infiltration Scale 0 09/16/22 1000   Number of days: 6     Data:     LABS    CMP:   Recent Labs   Lab 09/16/22  0839 09/16/22  0606 09/16/22  0442 09/16/22  0028 09/15/22  1121 09/15/22  1021 09/15/22  0025 09/15/22  0024 09/14/22 2012 09/14/22  1641 09/14/22  0814 09/14/22  0626 09/13/22  0809 09/13/22  0624 09/11/22  1805 09/11/22  1732 09/10/22  0814 09/10/22  0712   NA  --  140  --   --   --  144  --   --   --   --   --  141  --  143   < > 144   < > 144   POTASSIUM  --  4.7  --   --   --  4.7  --  5.0  --  5.7*  --  4.1   --  4.1   < > 3.8   < > 4.0   CHLORIDE  --  92*  --   --   --  94*  --   --   --   --   --  92*  --  93*   < > 94*   < > 95*   CO2  --  43*  --   --   --  46*  --   --   --   --   --  45*  --  47*   < > 48*   < > 48*   ANIONGAP  --  5*  --   --   --  4*  --   --   --   --   --  4*  --  3*   < > 2*   < > 1*   * 160* 160* 188*   < > 151*   < >  --    < >  --    < > 219*   < > 145*   < > 178*   < > 184*   BUN  --  100.0*  --   --   --  92.6*  --   --   --   --   --  84.5*  --  74.6*   < > 76.0*   < > 76.5*   CR  --  1.83*  --   --   --  1.84*  --   --   --   --   --  1.57*  --  1.38*   < > 1.53*   < > 1.45*   GFRESTIMATED  --  31*  --   --   --  31*  --   --   --   --   --  37*  --  44*   < > 39*   < > 41*   ESTUARDO  --  9.5  --   --   --  9.6  --   --   --   --   --  9.1  --  9.2   < > 9.2   < > 9.1   MAG  --  2.4*  --   --   --   --   --   --   --   --   --  2.3  --   --   --   --   --  2.3   PHOS  --  3.5  --   --   --   --   --   --   --   --   --  3.5  --   --   --  2.3*  --  3.1   PROTTOTAL  --  5.9*  --   --   --  6.2*  --   --   --   --   --   --   --  5.6*  --   --   --  5.5*   ALBUMIN  --  3.1*  --   --   --   --   --   --   --   --   --   --   --   --   --  3.3*  --  3.1*   BILITOTAL  --  0.2  --   --   --   --   --   --   --   --   --   --   --   --   --   --   --  0.2   ALKPHOS  --  85  --   --   --   --   --   --   --   --   --   --   --   --   --   --   --  88   AST  --  21  --   --   --   --   --   --   --   --   --   --   --   --   --   --   --  17   ALT  --  <5*  --   --   --   --   --   --   --   --   --   --   --   --   --   --   --  11    < > = values in this interval not displayed.     CBC:   Recent Labs   Lab 09/16/22  0606 09/15/22  1021 09/14/22  0626 09/13/22  0624   WBC 6.9 6.9 7.4 7.9   RBC 2.69* 2.60* 2.52* 2.45*   HGB 8.1* 7.9* 7.8* 7.5*   HCT 29.1* 27.9* 27.0* 25.9*   * 107* 107* 106*   MCH 30.1 30.4 31.0 30.6   MCHC 27.8* 28.3* 28.9* 29.0*   RDW 19.8* 20.0* 19.4* 19.4*   PLT  224 268 265 300       INR:   Recent Labs   Lab 09/15/22  1317   INR 0.94       Glucose:   Recent Labs   Lab 09/16/22  0839 09/16/22  0606 09/16/22  0442 09/16/22  0028 09/15/22  1943 09/15/22  1609   * 160* 160* 188* 159* 199*       Blood Gas:   Recent Labs   Lab 09/14/22  0626 09/13/22  0624 09/12/22  0851   PHV 7.38 7.36 7.36   PCO2V 77* 82* 81*   PO2V 58* 109* 45   HCO3V 45* 47* 46*   LINDY 18.7* 17.9* 15.5*   O2PER 20 30 2       Culture Data No results for input(s): CULT in the last 168 hours.    Virology Data:   Lab Results   Component Value Date    FLUAH1 Not Detected 09/13/2022    FLUAH3 Not Detected 09/13/2022    KZ2910 Not Detected 09/13/2022    IFLUB Not Detected 09/13/2022    RSVA Not Detected 09/13/2022    RSVB Not Detected 09/13/2022    PIV1 Not Detected 09/13/2022    PIV2 Not Detected 09/13/2022    PIV3 Not Detected 09/13/2022    HMPV Not Detected 09/13/2022       Historical CMV results (last 3 of prior testing):  Lab Results   Component Value Date    CMVQNT Not Detected 09/13/2022    CMVQNT Not Detected 09/07/2022    CMVQNT Not Detected 09/01/2022     No results found for: CMVLOG    Urine Studies    Recent Labs   Lab Test 08/01/22  0319 07/08/22  0831 07/05/22  1004   URINEPH 8.0* 5.5 5.5   NITRITE Negative Negative Negative   LEUKEST Negative Negative Negative   WBCU  --  1 5       Most Recent [place] Pulmonary Function Testing (FVC/FEV1 only)  FVC-Pre   Date Value Ref Range Status   09/07/2022 1.01 L    09/01/2022 1.07 L    08/24/2022 1.23 L    08/18/2022 1.33 L      FVC-%Pred-Pre   Date Value Ref Range Status   09/07/2022 33 %    09/01/2022 35 %    08/24/2022 40 %    08/18/2022 43 %      FEV1-Pre   Date Value Ref Range Status   09/07/2022 0.98 L    09/01/2022 1.02 L    08/24/2022 1.17 L    08/18/2022 1.22 L      FEV1-%Pred-Pre   Date Value Ref Range Status   09/07/2022 40 %    09/01/2022 42 %    08/24/2022 48 %    08/18/2022 50 %        IMAGING    Recent Results (from the past 48 hour(s))   XR  Chest 2 Views    Narrative    EXAM: XR CHEST 2 VIEWS  9/15/2022 10:08 AM     HISTORY:  interval follow up, lung transplant, right chest tube       COMPARISON:  Chest x-ray 9/14/2022 and chest CT 9/8/2022    FINDINGS:   PA and lateral views of the chest. Postoperative changes of lung  transplantation with clamshell sternotomy wires and mediastinal  surgical clips. Stable positioning of a right basilar chest tube.  Midline trachea. Cardiomediastinal silhouette is stable. Stable small  left greater than right pleural effusions and small amount of fluid in  the right minor fissure. No pneumothorax. Mildly improved diffuse  mixed interstitial and hazy pulmonary opacities. Visualized upper  abdomen is unremarkable.      Impression    IMPRESSION:   1. Stable left greater than right bilateral pleural effusions with  right-sided chest tube in place.  2. Mildly improved diffuse mixed interstitial and pulmonary opacities.    I have personally reviewed the examination and initial interpretation  and I agree with the findings.    JOHANN MORAES MD         SYSTEM ID:  [id number]   CT Chest Tube with Cath Placement    Narrative    Procedure 9/15/2022:  CT guided left sided chest tube placement    History: Status post lung transplant with loculated left-sided fluid  collection, drainage requested.    Comparison: CT chest 9/8/2022    Operators:   Staff: Dr. Paul  Fellow: Dr. Paulo MORRISON, Dr. Manpreet Paul, was present for the critical part of the  procedure and immediately available for the entire procedure.    Medications:   Patient received local anesthesia only.    Findings/procedure:    Prior to the procedure, both verbal and written informed consent  obtained from the patient. Patient placed right lateral decubitus on  the CT table. Preprocedure scan obtained revealing the left-sided  fluid collection and adequate percutaneous approach for drain  placement.     The left prepped and draped. Timeout performed. 1% Lidocaine  used for  local analgesia. Under intermittent CT fluoroscopic guidance, a 5F  Yueh centesis needle with needle stylette advanced into the fluid  collection.     Stylette removed. There was return of serosanguineous fluid. Catheter  removed over a wire. Tract dilated to 14 Luxembourger. 14 Luxembourger nonlocking  J-tipped catheter advanced over the wire into the collection. Catheter  secured to the skin with a 2-0 Ethilon retention suture. Dressing  applied and catheter connected to bag drainage.      Impression    Impression:  Uncomplicated CT fluoroscopic guided left chest tube placement.    Plan: Catheter to water seal.    I have personally reviewed the examination and initial interpretation  and I agree with the findings.    [facility]         SYSTEM ID:  [id number]

## 2022-09-16 NOTE — PROGRESS NOTES
CLINICAL NUTRITION SERVICES - REASSESSMENT NOTE     Nutrition Prescription    RECOMMENDATIONS FOR MDs/PROVIDERS TO ORDER:  1. Adjust free water flushes via feeding tube as needed, pending fluid status. Pt NPO and currently, free water flushes are minimal, or for patency. Monitor for volume depletion. BUN/Cr ratio of 55 and wt has downtrended (but wt overall increased over the last year); TF formula is concentrated (881 mL H2O/day via TF formula + H2O flushes).   2. Supplement vitamin B12 (methylmalonic acid was 0.61 on 9/10/2022)  3. Dilute suspension medication, as able, to possibly help further decrease stooling. Continue to administer Imodium as needed.     Malnutrition Status:    Patient does not meet two of the established criteria necessary for diagnosing malnutrition but is at risk for malnutrition    Recommendations already ordered by Registered Dietitian (RD):  Vitamin D lab   Modified micronutrient supplementation    Future/Additional Recommendations:  1. Continue NPO status as per team/GI status.   2. Continue TF regimen. K+ was 4.7 on 9/16 (was 5 on 9/15, 5.7 on 9/14). Monitor need to adjust, follow protein provisions.  3. Monitor BG control. Hgb A1c of 5.8 on 6/28/2022. BG was 160 on 9/16.   4. Monitor potential need for additional vitamin D supplementation pending lab (ordered for 9/17). Continue calcium/vitamin D as pt on prednisone.   5. Consider adjusting folic acid supplementation if warranted. Pt's folic acid lab was greater than 40 on 9/1.      EVALUATION OF THE PROGRESS TOWARD GOALS   Diet: No diet order/NPO this admission (see RD note 9/10)  Nutrition Support:    - Feeding Tube (FT) access: S/p GJ tube placement on 7/27/22. J-tube for TFs. Per RN, G tube to gravity drainage.  - TF regimen:   9/10-9/14: Novasource Renal @ 35 ml/hr (840 ml) + 1 pkt Prosource provides 1720 kcal (30 kcal/kg), 87 g pro (1.5 gm Pro/kg), 154 g CHO, 602 ml free water, and 0 g fiber daily.   9/14-current: Nepro @ goal  "40 ml/hr (960 ml/day) to provide 1728 kcals (30 kcal/kg/day), 78 g PRO (1.4 g/kg/day), 701 ml free H2O, 155 g CHO and 24 g Fiber daily.   - TF modulars: None at this time.  - Feeding Tube Flushes: 30 mL Q 4 hrs  - Intake: Pt received a five-day TF average which provided 1247 kcals and 64 g protein and does not pt's estimated kcal needs but meeting protein needs. TF infusing at 40 mL/hr (goal). Less than goal TF received with TF advancement and possibly TF interruptions.        NEW FINDINGS   GI: Per EHR, gastroparesis. Pt having one to five stools daily. Last stool was noted earlier today (9/16). Stools are watery and brown. C diff negative on 8/6 and 9/10 but was positive 7/11. Per provider 9/15, \"On MMF with chronic loose stools at baseline.\" Received Imodium twice on 9/12 and then once on 9/13 (currently ordered prn). Per discussion with pt 9/16, having fewer stools now than a week ago. She denies abdominal pain, N/V.   Wt Hx: 67.4 kg (6/30/21), 62.6 kg (11/11/21), 65.7 kg (6/28/2022), 70.5 kg (8/16/2022), 73.4 kg (9/9, admit), 69.1 kg (9/16) - Pt received lasix and bumex this admission, wt has decreased. Overall, wt is currently higher than weights 6/2021 and 11/2021.     ASSESSED NUTRITION NEEDS (updated)  Dosing Weight: 56 kg (using 6/28 wt of 65.7kg, adjusted, as likely drier wt and IBW of 52.3 kg)  Estimated Energy Needs: 3288-8016+ kcals/day (25-30+ kcals/kg)  Justification: Maintenance needs, rec high end of range  Estimated Protein Needs: 56-84 grams protein/day (1-1.5 grams of pro/kg)--pending renal tolerance  Justification: Maintenance needs  Estimated Fluid Needs: 7912-0444 mL/day (25 - 30 mL/kg)   Justification: Maintenance needs or per provider pending fluid status    MALNUTRITION  % Intake: Decreased intake does not meet criteria  % Weight Loss: None noted, but difficult to assess with fluid status and diuresis this admission.   Subcutaneous Fat Loss: None observed  Muscle Loss: None observed  Fluid " Accumulation/Edema: Mild  Malnutrition Diagnosis: Patient does not meet two of the established criteria necessary for diagnosing malnutrition but is at risk for malnutrition    Previous Goals   Total avg nutritional intake to meet a minimum of 25 kcal/kg and 1.3 g PRO/kg daily (per dosing wt 57 kg).  Evaluation: Meeting kcal needs but not protein needs.     Previous Nutrition Diagnosis  Altered GI function related to gastroparesis due to pyloric inflammation/ulceration as evidenced by need for permanent FT with TF meeting 100% of estimated needs.  Evaluation: Unresolved. Changed to new nutrition dx.    CURRENT NUTRITION DIAGNOSIS  Inadequate energy intake related to less than goal TF received as evidenced by pt received a five-day TF average which provided 1247 kcals while estimated needs are 3142-8126+ kcals/day (25-30+ kcals/kg).    INTERVENTIONS  Implementation  Ordered Vitamin D lab .   Multivitamin/mineral supplement therapy: Modified nephronex to a multivitamin with minerals tablet.  Collaboration with other providers: Paged team regarding recs for MDs/providers to order.       Goals  Total avg nutritional intake to meet a minimum of 25 kcal/kg and 1 g PRO/kg daily (per dosing wt 56 kg).    Monitoring/Evaluation  Progress toward goals will be monitored and evaluated per protocol.     Nutrition will continue to follow.      Kathie Bateman, MS, RD, LD, [facility]   6C Pgr: [phone]

## 2022-09-16 NOTE — PROGRESS NOTES
[facility]    Medicine Progress Note - Hospitalist Service, GOLD TEAM 10    Date of Admission:  9/9/2022    Assessment & Plan        Sofie Rodriguez is a 62 year old female with pertinent hx of end stage COPD s/p bilateral sequential lung transplant (6/22) on triple IS ( MMF/Tacro/pred), pyloric ulcer, recent ERCP c/f hemobilia, gastroparesis s/p GJ tube placement and Percutaneous J tube presents for a planned admission from transplant pulmonology to work up antibody medicated rejection versus infection.     Interval Changes:  -IV diuresis (bumex 4 mg)  -left and right chest tubes to suction with BID lytics (tPA-dornase) for 6 doses  -Gabapentin 200 mg TID for chest pain, oxycodone prn and tylenol, lidocaine patch for pain associated with chest tubes  -Decrease insulin to q6h   -Full liquid diet  -B12 supplement      # End stage COPD s/p bilateral sequential lung transplant (6/22)   # hx of Bilateral hydrothorax after tx  # persistent hypercapnia   # Mycobacterium peregrinum colonization   Baseline oxygen need - 1L NC.   Recent imaging - 9/8/22 CT chest - R>L pleural effusions with bilateral hazy                              nodular opacities and paraseptal thickening.   PFT - FEV1 40%, FVC - 33% FEV1/FVC 79%  Immune suppression - Tacrolimus 3.5 mg BID Goal level 8-12,  mg                                         BID ( decreased dose in the setting of GI symptoms),                                        prednisone taper currently on 10 mg BID until 9/15/22.  CMV ppx - Valgancyclovir 450 mg TID ( ending 9/28)   Oral candida ppx - Nystatin   -S/p right pigtail chest tube placement on 9/13, left thoracentesis on 9/13   Plan:  >Chest tube to drainage - left and right  >Chest tube lytics - tPa/dornase BID for 6 doses  >Lidoderm patch for chest tube  >Gabaentin 200 mg TID for chest pain, oxycodone prn  >IV diuresis    #Acute on Chronic HFpEF  Echo 8/15/22 - EF 60-65% with  trace mitral insufficiency, admission BNP > 30K   Plan:  IV bumex, PO acetazolamide    # Diarrhea, Loose stools  # hx of C diff   Patient is on MMF with chronic loose stools at baseline. Notes reflect recent dose reduction of MMF for this.  -C diff (negtaive on 9/10)  Plan:  >Immodium prn    #Steroid induced hyperglycemia  -Lantus 5 units, sliding scale q6h    # Pyloric ulcer EGD 8/3/2022  # Acute on chronic macrocytic anemia    hx of coffee ground emesis. Patient had a EGD 8/3 which showed a Clear base ulcer in the pyloric channel identified as the culprit lesion. At the time was also noted to have excess gastric accumulation 2/2 pyloric inflammation. Vent G tube placed to allow for drainage. Vented to gravity now.  repeat EGD planned in October 2022 to check healing.  Patient's hgb hit loco 6.3 9/1 and since then in the 7s-8s. 7.1 this admission.   Plan:  - Pantoprazole 40 mg BID     - patient consented for blood transfusion, type and screen ordered.     # extra hepatic and intrahepatic biliary dilation c/f hemobilia   # Intra ductal papillary mucinous neoplasm   ERCP 8/11 showed hemobilia.   - Monitor LFTs     #CKD stage III   Patient has been variable GFRs 30-50s in the last few months. Most recent Cr trend 1.5-1.9 1.56 9/8/22.   - CTM     #Severe gastroparesis s/p  GJ tube placement 7/27/2022  - RD nutrition consult placed for TF  - Percutaneous j tube in place with G venting to gravity     #HTN  # A flutter with RVR/SVT   . Has recently completed zio patch.  - Continue PTA metoprolol 50 mg BID          Diet: Adult Formula Drip Feeding: Continuous Nepro with Carbsteady; Jejunostomy; Goal Rate: 40 ml/hr--okay to begin at goal once new formula arrives on unit.; mL/hr; Medication - Feeding Tube Flush Frequency: At least 15-30 mL water before and after med...  Full Liquid Diet    DVT Prophylaxis: heparin  Dong Catheter: Not present  Central Lines: None  Cardiac Monitoring: None  Code Status: Full Code       Disposition Plan     Expected Discharge Date: 09/19/2022        Discharge Comments: Chest tube in place, will need to be removed prior to discharge. Possible 2n chest tube placement on 9/15        The patient's care was discussed with the Bedside Nurse, Patient and [facility] transplant Team.    Chidi Fay MD  Hospitalist Service, GOLD TEAM 10  [facility]  Securely message with the Vocera Web Console (learn more here)  Text page via [facility] Paging/Directory   Please see signed in provider for up to date coverage information      Clinically Significant Risk Factors Present on Admission                       ______________________________________________________________________    Interval History   No acute events overnight.She reports she did not enjoy having to uriante so much at night. She would like to use diuretics during the day. She felt that she had a significant amount of urine out with 2nd diuretic dose. She reports more chest pain from chest tube sites. IF she is laying in bed this is improved. Diarrhea has been stable. No fever or chills      Data reviewed today: I reviewed all medications, new labs and imaging results over the last 24 hours.    Physical Exam   Vital Signs: Temp: 98.5  F (36.9  C) Temp src: Oral BP: (!) 142/64 Pulse: 82   Resp: 20 SpO2: 97 % O2 Device: Nasal cannula Oxygen Delivery: 1 LPM  Weight: 156 lbs 15.48 oz     Constitutional: awake, tired after procedure, somnolent but awakens to voice  Neck: + HJR  Respiratory: crackles in lower posterior lung fields bilaterally , right chest tube to atrium and wall suction, left chest tube to water seal  Cardiovascular: Regular rate and rhythm, normal S1, pronounced s2, systolic murmur 2/6  GI: Normal bowel sounds, soft, non-distended, non-tender, GJ placement  Ext: 1+ pitting edema  Neuropsychiatric: moving all extremities, no focal deficit, holding eye contact  Data   Recent Results (from  the past 24 hour(s))   XR Chest 2 Views    Narrative    EXAM: XR CHEST 2 VIEWS  9/15/2022 10:08 AM     HISTORY:  interval follow up, lung transplant, right chest tube       COMPARISON:  Chest x-ray 9/14/2022 and chest CT 9/8/2022    FINDINGS:   PA and lateral views of the chest. Postoperative changes of lung  transplantation with clamshell sternotomy wires and mediastinal  surgical clips. Stable positioning of a right basilar chest tube.  Midline trachea. Cardiomediastinal silhouette is stable. Stable small  left greater than right pleural effusions and small amount of fluid in  the right minor fissure. No pneumothorax. Mildly improved diffuse  mixed interstitial and hazy pulmonary opacities. Visualized upper  abdomen is unremarkable.      Impression    IMPRESSION:   1. Stable left greater than right bilateral pleural effusions with  right-sided chest tube in place.  2. Mildly improved diffuse mixed interstitial and pulmonary opacities.    I have personally reviewed the examination and initial interpretation  and I agree with the findings.    JOHANN MORAES MD         SYSTEM ID:  [id number]   CT Chest Tube with Cath Placement    Narrative    Procedure 9/15/2022:  CT guided left sided chest tube placement    History: Status post lung transplant with loculated left-sided fluid  collection, drainage requested.    Comparison: CT chest 9/8/2022    Operators:   Staff: Dr. Paul  Fellow: Dr. Bates    Medications:   Patient received local anesthesia only.    Findings/procedure:    Prior to the procedure, both verbal and written informed consent  obtained from the patient. Patient placed right lateral decubitus on  the CT table. Preprocedure scan obtained revealing the left-sided  fluid collection and adequate percutaneous approach for drain  placement.     The left prepped and draped. Timeout performed. 1% Lidocaine used for  local analgesia. Under intermittent CT fluoroscopic guidance, a 5F  Impressto needle with  needle stylette advanced into the fluid  collection.     Stylette removed. There was return of serosanguineous fluid. Catheter  removed over a wire. Tract dilated to 14 Malay. 14 Malay nonlocking  J-tipped catheter advanced over the wire into the collection. Catheter  secured to the skin with a 2-0 Ethilon retention suture. Dressing  applied and catheter connected to bag drainage.      Impression    Impression:  Uncomplicated CT fluoroscopic guided left chest tube placement.    Plan: Catheter to water seal.

## 2022-09-16 NOTE — PLAN OF CARE
Pt having significant pain at bilat chest tube sites. Grimacing and holding her breath with any activity or change in position. Able to tolerate being up in chair for short time and uses bedside commode with all of the lines she has attached. Receiving scheduled 975mg tylenol. Gabapentin tid added and prn oxycodone 5mg increased to q4hrs this afternoon.   L chest tube now placed to sxn as well. Plan for lytics to be infused in L chest tube when med avail from pharm.  Pt now allowed full liquid diet but has not wanted to order anything yet to try. Continues on continuous TF @40ml/hr via PEG tube. All meds to be given down J tube per pulm team clarification. G tube remains open to gravity drain.   Cr remains at 1.83. Pt given 1-time dose of IV bumex. 3 episodes of mixed urine and liquid stool thus far today-given imodium as well x1 today.   Tacro dosing to be slightly increased this jagdeep based on today's lab.   Pt pleasant but quiet and withdrawn throughout day; states she does not want visitors and isn't in any mood to really talk to family today. Feels depressed having so many complications post-transplant.

## 2022-09-16 NOTE — PROGRESS NOTES
D-BSLT patient form 06/28/22, readmitted on 09/09/22 for persistent dyspnea, hemoptysis and increasing bilateral LE edema. S/p bronchoscopy , placement of R pigtail chest tube and aspiration of L pleural effusion on 09/13/22. Placement of L pigtail chest tube in IR this afternoon. C/o pain at chest tube sites, L worse than R.  I-Prn oxycodone for pain. 1l O2 via NC.  A-Remains dyspneic on exertion.  P-Continue with current poc. Notify provider of any concerns/changes.

## 2022-09-16 NOTE — PROGRESS NOTES
Care Management Follow Up    Length of Stay (days): 7    Expected Discharge Date: 09/20/2022     Concerns to be Addressed: all concerns addressed in this encounter     Patient plan of care discussed at interdisciplinary rounds: Yes    Anticipated Discharge Disposition: Home     Anticipated Discharge Services:    Anticipated Discharge DME:      Patient/family educated on Medicare website which has current facility and service quality ratings:    Education Provided on the Discharge Plan:    Patient/Family in Agreement with the Plan:      Referrals Placed by CM/SW:    Private pay costs discussed: Not applicable    Additional Information:  Supportive visit with patient.  Having lots of pain around her chest tubes.  Support and encouragement provided.  Informed her that I would be out of the office for the next two weeks and Nicolle Romero will be covering.     JOAN Hannon  Lung Transplant   Phone: [phone] Pager: [phone]       JOAN Concepcion

## 2022-09-16 NOTE — PLAN OF CARE
I/A: A/Ox4. VSS on 1L NC. SR on tele. Endorses generalized pain managed with oxy q8h. ANAYA. NPO. BG q4h. BLE edema, compression stockings on. Adequate UOP. LBM 9/15, no stool overnight. A1 w/ walker. Appeared to sleep comfortably.      Lines/tubes:  L pleural pigtail to water seal  R midaxillary to -20 suction  J tube w/ TF's at 40 ml/hr w/ FWFs  G tube to gravity drainage  Purewick    P: IV diuresis with possible cards consult pending fluid status. Discharge pending CT removal. Stool sample still needed.    Hours of care: [id number]

## 2022-09-16 NOTE — DISCHARGE INSTRUCTIONS
[facility]  [address] , [address]  [place] [address]  Phone: [phone]  Fax: [phone]     You are set up with outpatient dialysis Tues/Thurs/Saturdays at 6:45am. You will need to arrive at 6am on the first day, 10/11/22.     [employer] (Ph: [phone])  Recurring transportation has been arranged for you starting 10/11 through your [facility] Provide a Ride benefit.  Rides have been setup T/Th/Sat through 11/12.     On 10/11, estimated pickup time is 5:30am for a 6am arrival.  Following this estimated pickup time is 6:15am for a 6:45am arrival.     Return rides are will call, you will call once your dialysis treatments is complete.     If you need to adjust the rides, contact [facility] Provide a Ride as below.      Pt updated on the above arrangements, will adjust as needed.     [facility] Provide a Ride  Phone: [phone]    You have transportation benefits for follow up/outpatient appointments through your insurance. You will need to call 48 hours in advance of scheduled appointments to set up ride.

## 2022-09-16 NOTE — PLAN OF CARE
Goal Outcome Evaluation:    Plan of Care Reviewed With: patient     Overall Patient Progress: improving    Outcome Evaluation: Continue to infuse TF at goal rate to provide 100% of nutrition needs. Monitor need for Imodium.

## 2022-09-17 ENCOUNTER — APPOINTMENT (OUTPATIENT)
Dept: GENERAL RADIOLOGY | Facility: CLINIC | Age: 62
DRG: 205 | End: 2022-09-17
Attending: PHYSICIAN ASSISTANT
Payer: MEDICARE

## 2022-09-17 ENCOUNTER — HEALTH MAINTENANCE LETTER (OUTPATIENT)
Age: 62
End: 2022-09-17

## 2022-09-17 ENCOUNTER — APPOINTMENT (OUTPATIENT)
Dept: PHYSICAL THERAPY | Facility: CLINIC | Age: 62
DRG: 205 | End: 2022-09-17
Attending: INTERNAL MEDICINE
Payer: MEDICARE

## 2022-09-17 LAB
ANION GAP SERPL CALCULATED.3IONS-SCNC: 2 MMOL/L (ref 7–15)
BUN SERPL-MCNC: 101 MG/DL (ref 8–23)
CALCIUM SERPL-MCNC: 9.7 MG/DL (ref 8.8–10.2)
CHLORIDE SERPL-SCNC: 92 MMOL/L (ref 98–107)
CREAT SERPL-MCNC: 1.83 MG/DL (ref 0.51–0.95)
DEPRECATED HCO3 PLAS-SCNC: 48 MMOL/L (ref 22–29)
ERYTHROCYTE [DISTWIDTH] IN BLOOD BY AUTOMATED COUNT: 19.3 % (ref 10–15)
GFR SERPL CREATININE-BSD FRML MDRD: 31 ML/MIN/1.73M2
GLUCOSE BLDC GLUCOMTR-MCNC: 151 MG/DL (ref 70–99)
GLUCOSE BLDC GLUCOMTR-MCNC: 166 MG/DL (ref 70–99)
GLUCOSE BLDC GLUCOMTR-MCNC: 166 MG/DL (ref 70–99)
GLUCOSE BLDC GLUCOMTR-MCNC: 242 MG/DL (ref 70–99)
GLUCOSE SERPL-MCNC: 146 MG/DL (ref 70–99)
HCT VFR BLD AUTO: 30 % (ref 35–47)
HGB BLD-MCNC: 8.5 G/DL (ref 11.7–15.7)
MCH RBC QN AUTO: 30.5 PG (ref 26.5–33)
MCHC RBC AUTO-ENTMCNC: 28.3 G/DL (ref 31.5–36.5)
MCV RBC AUTO: 108 FL (ref 78–100)
NT-PROBNP SERPL-MCNC: ABNORMAL PG/ML (ref 0–900)
PLATELET # BLD AUTO: 236 10E3/UL (ref 150–450)
POTASSIUM SERPL-SCNC: 4.7 MMOL/L (ref 3.4–5.3)
RBC # BLD AUTO: 2.79 10E6/UL (ref 3.8–5.2)
SODIUM SERPL-SCNC: 142 MMOL/L (ref 136–145)
WBC # BLD AUTO: 7.4 10E3/UL (ref 4–11)

## 2022-09-17 PROCEDURE — 36415 COLL VENOUS BLD VENIPUNCTURE: CPT | Performed by: STUDENT IN AN ORGANIZED HEALTH CARE EDUCATION/TRAINING PROGRAM

## 2022-09-17 PROCEDURE — 250N000013 HC RX MED GY IP 250 OP 250 PS 637: Performed by: PEDIATRICS

## 2022-09-17 PROCEDURE — 85014 HEMATOCRIT: CPT | Performed by: STUDENT IN AN ORGANIZED HEALTH CARE EDUCATION/TRAINING PROGRAM

## 2022-09-17 PROCEDURE — 97116 GAIT TRAINING THERAPY: CPT | Mod: GP | Performed by: PHYSICAL THERAPIST

## 2022-09-17 PROCEDURE — 97530 THERAPEUTIC ACTIVITIES: CPT | Mod: GP | Performed by: PHYSICAL THERAPIST

## 2022-09-17 PROCEDURE — 250N000013 HC RX MED GY IP 250 OP 250 PS 637: Performed by: STUDENT IN AN ORGANIZED HEALTH CARE EDUCATION/TRAINING PROGRAM

## 2022-09-17 PROCEDURE — 250N000012 HC RX MED GY IP 250 OP 636 PS 637

## 2022-09-17 PROCEDURE — 250N000009 HC RX 250: Performed by: STUDENT IN AN ORGANIZED HEALTH CARE EDUCATION/TRAINING PROGRAM

## 2022-09-17 PROCEDURE — 71046 X-RAY EXAM CHEST 2 VIEWS: CPT

## 2022-09-17 PROCEDURE — 250N000011 HC RX IP 250 OP 636: Performed by: STUDENT IN AN ORGANIZED HEALTH CARE EDUCATION/TRAINING PROGRAM

## 2022-09-17 PROCEDURE — 83880 ASSAY OF NATRIURETIC PEPTIDE: CPT | Performed by: STUDENT IN AN ORGANIZED HEALTH CARE EDUCATION/TRAINING PROGRAM

## 2022-09-17 PROCEDURE — 250N000012 HC RX MED GY IP 250 OP 636 PS 637: Performed by: PHYSICIAN ASSISTANT

## 2022-09-17 PROCEDURE — 82306 VITAMIN D 25 HYDROXY: CPT | Performed by: PEDIATRICS

## 2022-09-17 PROCEDURE — 71046 X-RAY EXAM CHEST 2 VIEWS: CPT | Mod: 26 | Performed by: RADIOLOGY

## 2022-09-17 PROCEDURE — 250N000013 HC RX MED GY IP 250 OP 250 PS 637

## 2022-09-17 PROCEDURE — 258N000003 HC RX IP 258 OP 636: Performed by: STUDENT IN AN ORGANIZED HEALTH CARE EDUCATION/TRAINING PROGRAM

## 2022-09-17 PROCEDURE — 99233 SBSQ HOSP IP/OBS HIGH 50: CPT | Performed by: STUDENT IN AN ORGANIZED HEALTH CARE EDUCATION/TRAINING PROGRAM

## 2022-09-17 PROCEDURE — 80048 BASIC METABOLIC PNL TOTAL CA: CPT | Performed by: STUDENT IN AN ORGANIZED HEALTH CARE EDUCATION/TRAINING PROGRAM

## 2022-09-17 PROCEDURE — 214N000001 HC R&B CCU UMMC

## 2022-09-17 PROCEDURE — 36415 COLL VENOUS BLD VENIPUNCTURE: CPT | Performed by: PEDIATRICS

## 2022-09-17 PROCEDURE — 250N000013 HC RX MED GY IP 250 OP 250 PS 637: Performed by: NURSE PRACTITIONER

## 2022-09-17 PROCEDURE — 99233 SBSQ HOSP IP/OBS HIGH 50: CPT | Mod: 24 | Performed by: PHYSICIAN ASSISTANT

## 2022-09-17 RX ORDER — BUMETANIDE 0.25 MG/ML
4 INJECTION INTRAMUSCULAR; INTRAVENOUS ONCE
Status: COMPLETED | OUTPATIENT
Start: 2022-09-17 | End: 2022-09-17

## 2022-09-17 RX ADMIN — OXYCODONE HYDROCHLORIDE 5 MG: 5 SOLUTION ORAL at 15:58

## 2022-09-17 RX ADMIN — NYSTATIN 1000000 UNITS: 100000 SUSPENSION ORAL at 20:21

## 2022-09-17 RX ADMIN — TACROLIMUS 4 MG: 5 CAPSULE ORAL at 17:15

## 2022-09-17 RX ADMIN — HEPARIN SODIUM 5000 UNITS: 5000 INJECTION, SOLUTION INTRAVENOUS; SUBCUTANEOUS at 10:07

## 2022-09-17 RX ADMIN — OXYCODONE HYDROCHLORIDE 5 MG: 5 SOLUTION ORAL at 20:19

## 2022-09-17 RX ADMIN — Medication 50 ML: at 09:13

## 2022-09-17 RX ADMIN — ACETAZOLAMIDE 125 MG: 125 TABLET ORAL at 08:16

## 2022-09-17 RX ADMIN — ACETAZOLAMIDE 125 MG: 125 TABLET ORAL at 15:58

## 2022-09-17 RX ADMIN — PREDNISONE 7.5 MG: 2.5 TABLET ORAL at 20:19

## 2022-09-17 RX ADMIN — Medication 40 MG: at 20:23

## 2022-09-17 RX ADMIN — INSULIN ASPART 1 UNITS: 100 INJECTION, SOLUTION INTRAVENOUS; SUBCUTANEOUS at 04:11

## 2022-09-17 RX ADMIN — NYSTATIN 1000000 UNITS: 100000 SUSPENSION ORAL at 08:15

## 2022-09-17 RX ADMIN — MYCOPHENOLATE MOFETIL 750 MG: 200 POWDER, FOR SUSPENSION ORAL at 20:23

## 2022-09-17 RX ADMIN — TACROLIMUS 3.5 MG: 5 CAPSULE ORAL at 08:16

## 2022-09-17 RX ADMIN — Medication 50 ML: at 20:18

## 2022-09-17 RX ADMIN — OXYCODONE HYDROCHLORIDE 5 MG: 5 SOLUTION ORAL at 12:18

## 2022-09-17 RX ADMIN — METOPROLOL TARTRATE 50 MG: 50 TABLET, FILM COATED ORAL at 08:17

## 2022-09-17 RX ADMIN — GABAPENTIN 200 MG: 100 CAPSULE ORAL at 14:18

## 2022-09-17 RX ADMIN — BUMETANIDE 4 MG: 0.25 INJECTION INTRAMUSCULAR; INTRAVENOUS at 13:24

## 2022-09-17 RX ADMIN — HEPARIN SODIUM 5000 UNITS: 5000 INJECTION, SOLUTION INTRAVENOUS; SUBCUTANEOUS at 20:20

## 2022-09-17 RX ADMIN — ACETAMINOPHEN 975 MG: 325 TABLET, FILM COATED ORAL at 08:16

## 2022-09-17 RX ADMIN — LOPERAMIDE HYDROCHLORIDE 2 MG: 2 CAPSULE ORAL at 14:18

## 2022-09-17 RX ADMIN — CALCIUM CARBONATE 600 MG (1,500 MG)-VITAMIN D3 400 UNIT TABLET 1 TABLET: at 17:15

## 2022-09-17 RX ADMIN — INSULIN ASPART 1 UNITS: 100 INJECTION, SOLUTION INTRAVENOUS; SUBCUTANEOUS at 10:05

## 2022-09-17 RX ADMIN — ACETAMINOPHEN 975 MG: 325 TABLET, FILM COATED ORAL at 20:19

## 2022-09-17 RX ADMIN — OXYCODONE HYDROCHLORIDE 5 MG: 5 SOLUTION ORAL at 04:11

## 2022-09-17 RX ADMIN — Medication 1 TABLET: at 08:16

## 2022-09-17 RX ADMIN — PREDNISONE 10 MG: 10 TABLET ORAL at 08:16

## 2022-09-17 RX ADMIN — OXYCODONE HYDROCHLORIDE 5 MG: 5 SOLUTION ORAL at 08:15

## 2022-09-17 RX ADMIN — INSULIN ASPART 3 UNITS: 100 INJECTION, SOLUTION INTRAVENOUS; SUBCUTANEOUS at 16:07

## 2022-09-17 RX ADMIN — GABAPENTIN 200 MG: 100 CAPSULE ORAL at 08:16

## 2022-09-17 RX ADMIN — ACETAMINOPHEN 975 MG: 325 TABLET, FILM COATED ORAL at 14:18

## 2022-09-17 RX ADMIN — NYSTATIN 1000000 UNITS: 100000 SUSPENSION ORAL at 15:58

## 2022-09-17 RX ADMIN — GABAPENTIN 200 MG: 100 CAPSULE ORAL at 20:19

## 2022-09-17 RX ADMIN — MYCOPHENOLATE MOFETIL 750 MG: 200 POWDER, FOR SUSPENSION ORAL at 08:16

## 2022-09-17 RX ADMIN — LOPERAMIDE HYDROCHLORIDE 2 MG: 2 CAPSULE ORAL at 04:11

## 2022-09-17 RX ADMIN — CALCIUM CARBONATE 600 MG (1,500 MG)-VITAMIN D3 400 UNIT TABLET 1 TABLET: at 08:16

## 2022-09-17 RX ADMIN — Medication 40 MG: at 12:19

## 2022-09-17 RX ADMIN — FOLIC ACID 1 MG: 1 TABLET ORAL at 08:16

## 2022-09-17 RX ADMIN — CYANOCOBALAMIN TAB 500 MCG 500 MCG: 500 TAB at 08:16

## 2022-09-17 RX ADMIN — METOPROLOL TARTRATE 50 MG: 50 TABLET, FILM COATED ORAL at 20:19

## 2022-09-17 RX ADMIN — INSULIN ASPART 1 UNITS: 100 INJECTION, SOLUTION INTRAVENOUS; SUBCUTANEOUS at 22:13

## 2022-09-17 RX ADMIN — NYSTATIN 1000000 UNITS: 100000 SUSPENSION ORAL at 12:18

## 2022-09-17 ASSESSMENT — ACTIVITIES OF DAILY LIVING (ADL)
ADLS_ACUITY_SCORE: 30

## 2022-09-17 NOTE — PLAN OF CARE
Hours of Care:  2300 - 0700    Temp:  [98  F (36.7  C)-98.6  F (37  C)] 98.4  F (36.9  C)  Pulse:  [60-96] 74  Resp:  [16-20] 18  BP: (122-148)/(61-77) 127/61  Cuff Mean (mmHg):  [88] 88  SpO2:  [86 %-100 %] 100 %     D: Sofie Rodriguez is a 62-year-old female s/p recent BSLT (6/28/22) for COPD complicated by persistent bilateral pleural effusions, persistent CO2 retention, right hemidiaphragm palsy, left radial artery thrombosis (2/2 arterial line) s/p thrombectomy, BACILIO, C. diff colitis, gastroparesis s/p GJ tube placement (7/27/22), GI bleed 2/2 pyloric ulcer, hemobilia s/p ERCP and MRCP (August 2022), and persistent vasoplegia.  Pre-transplant history also significant for HTN, HFpEF, NTM colonization, hepatitis C, and previous methamphetamine use.  Patient admitted 9/9/22 with persistent dyspnea (increased with activity), daily morning hemoptysis, and increased BLE edema.  Also noted to have persistent, increased bilateral pleural effusions, diffuse bilateral groundglass changes, and more focal appearing LLL infiltrates on imaging.  Treated with aggressive diuresis with some improvement in symptoms and hypoxia.  Bronchoscopy (9/13) grossly normal, studies sent.  Also s/p pigtail chest tube for right pleural effusion and aspiration of left pleural effusion (9/13).  Hemoptysis resolved, mild hypoxia and ANAYA persisting.  Now s/p pigtail chest tube for left pleural effusion (9/15).         I: Monitored vitals and assessed pt status.     PRN: Oxycodone x1 Loperamide x1  Tele: Sinus rhythm  O2: 1 L O2 via nasal cannula  Mobility: SBA    A: Neuro: A/O x4.  Call light appropriate.  Able to make needs known.  Respiratory:  On 1 L O2 via nasal cannula.  Lung sounds clear.  Denies shortness of breath at rest.  Cardiac: VSS. VS Q8H.  Receiving heparin Q12H subcutaneous.  No s/s of bleeding.  GI: Last BM 9/17.  1 loose, watery BM overnight.  PRN loperamide administered. No report of nausea or vomiting.  : Urinating adequate  amounts of clear, yellow urine  Endo:  Blood sugars Q6H.  Last .  1 unit insulin aspart administered per order.  Skin:  See PCS for assessment and treatment of wounds and surgical incisions.  LDA:  Chest tube x2 to -20 cmH2O.  No air leak noted.  Dressings CDI.  Receiving alteplase to left chest tube BID x 6 doses.  Electrolytes: No RN managed electrolyte protocols ordered.  Pain: Reporting 6/10 pain in chest tube sites.  PRN oxycodone administered per request.  Isolation:  Standard Precautions  Tests/procedures: None performed overnight.  Diet: Full liquid diet for comfort.  Receives continuous enteral feeding via J tube.  Nepro 40 mL/hr.  Feeding and medications to be administered through J tube.  G tube to gravity drainage.    P: Continue to monitor Pt status and report changes to Gold 10.

## 2022-09-17 NOTE — PROGRESS NOTES
[facility]    Medicine Progress Note - Hospitalist Service, GOLD TEAM 10    Date of Admission:  9/9/2022    Assessment & Plan        Sofie Rodriguez is a 62 year old female with pertinent hx of end stage COPD s/p bilateral sequential lung transplant (6/22) on triple IS ( MMF/Tacro/pred), pyloric ulcer, recent ERCP c/f hemobilia, gastroparesis s/p GJ tube placement and Percutaneous J tube presents for a planned admission from transplant pulmonology to work up antibody medicated rejection versus infection.     Interval Changes:  -Continue IV diuresis  -Continue chest tube lytics BID    # End stage COPD s/p bilateral sequential lung transplant (6/22)   # hx of Bilateral hydrothorax after tx  # persistent hypercapnia   # Mycobacterium peregrinum colonization   Baseline oxygen need - 1L NC.   Recent imaging - 9/8/22 CT chest - R>L pleural effusions with bilateral hazy                              nodular opacities and paraseptal thickening.   PFT - FEV1 40%, FVC - 33% FEV1/FVC 79%  Immune suppression - Tacrolimus 3.5 mg BID Goal level 8-12,  mg                                         BID ( decreased dose in the setting of GI symptoms),                                        prednisone taper currently on 10 mg BID until 9/15/22.  CMV ppx - Valgancyclovir 450 mg TID ( ending 9/28)   Oral candida ppx - Nystatin   -S/p right pigtail chest tube placement on 9/13, left thoracentesis on 9/13   Plan:  >Chest tube to drainage - left and right  >Chest tube lytics - tPa/dornase BID for 6 doses - started on 9/16  >Lidoderm patch for chest tube  >Gabaentin 200 mg TID for chest pain, oxycodone prn  >IV diuresis    #Acute on Chronic HFpEF  Echo 8/15/22 - EF 60-65% with trace mitral insufficiency, admission BNP > 30K   Plan:  IV bumex, PO acetazolamide    # Diarrhea, Loose stools  # hx of C diff   Patient is on MMF with chronic loose stools at baseline. Notes reflect recent dose  reduction of MMF for this.  -C diff (negtaive on 9/10)  Plan:  >Immodium prn    #Steroid induced hyperglycemia  -Lantus 5 units, sliding scale q6h    # Pyloric ulcer EGD 8/3/2022  # Acute on chronic macrocytic anemia    hx of coffee ground emesis. Patient had a EGD 8/3 which showed a Clear base ulcer in the pyloric channel identified as the culprit lesion. At the time was also noted to have excess gastric accumulation 2/2 pyloric inflammation. Vent G tube placed to allow for drainage. Vented to gravity now.  repeat EGD planned in October 2022 to check healing.  Patient's hgb hit loco 6.3 9/1 and since then in the 7s-8s. 7.1 this admission.   Plan:  - Pantoprazole 40 mg BID     - patient consented for blood transfusion, type and screen ordered.     # extra hepatic and intrahepatic biliary dilation c/f hemobilia   # Intra ductal papillary mucinous neoplasm   ERCP 8/11 showed hemobilia.   - Monitor LFTs     #CKD stage III   Patient has been variable GFRs 30-50s in the last few months. Most recent Cr trend 1.5-1.9 1.56 9/8/22.   - CTM     #Severe gastroparesis s/p  GJ tube placement 7/27/2022  - RD nutrition consult placed for TF  - Percutaneous j tube in place with G venting to gravity     #HTN  # A flutter with RVR/SVT   . Has recently completed zio patch.  - Continue PTA metoprolol 50 mg BID          Diet: Adult Formula Drip Feeding: Continuous Nepro with Carbsteady; Jejunostomy; Goal Rate: 40 ml/hr--okay to begin at goal once new formula arrives on unit.; mL/hr; Medication - Feeding Tube Flush Frequency: At least 15-30 mL water before and after med...  Full Liquid Diet    DVT Prophylaxis: heparin  Dong Catheter: Not present  Central Lines: None  Cardiac Monitoring: None  Code Status: Full Code      Disposition Plan     Expected Discharge Date: 09/20/2022        Discharge Comments: Bilateral chest tube placement, last one placed on 9/15. Both need to be removed prior to dispo.        The patient's care was  discussed with the Bedside Nurse, Patient and [facility] transplant Team.    Chidi Fay MD  Hospitalist Service, GOLD TEAM 10  M [facility]  Securely message with the Vocera Web Console (learn more here)  Text page via [facility] Paging/Directory   Please see signed in provider for up to date coverage information      Clinically Significant Risk Factors Present on Admission                       ______________________________________________________________________    Interval History   No acute events overnight. She states her breathing has been improving. No fever or chills. Chest pain is improving with current regiment. No nausea or vomiting. Had some loose stools but responding to immodium. States her swelling is also improving.       Data reviewed today: I reviewed all medications, new labs and imaging results over the last 24 hours.    Physical Exam   Vital Signs: Temp: 98.4  F (36.9  C) Temp src: Oral BP: 127/61 Pulse: 74   Resp: 18 SpO2: 100 % O2 Device: Nasal cannula Oxygen Delivery: (P) 1 LPM  Weight: 152 lbs 6.4 oz     Constitutional: awake, tired after procedure, somnolent but awakens to voice  Neck: supple  Respiratory: crackles in lower posterior lung fields bilaterally , right chest tube to atrium and wall suction, left chest tube to atrium and wall suction  Cardiovascular: Regular rate and rhythm, normal S1, pronounced s2, systolic murmur 2/6  GI: Normal bowel sounds, soft, non-distended, non-tender, GJ placement  Ext: Tace pitting edema in lower extremities   Neuropsychiatric: moving all extremities, no focal deficit, holding eye contact  Data   Recent Results (from the past 24 hour(s))   XR Chest 2 Views    Narrative    EXAM: XR CHEST 2 VIEWS  9/16/2022 10:27 AM     HISTORY:  interval follow up, lung transplant, right chest tube       COMPARISON:  Chest CT 9/8/2022 and chest radiograph 9/15/2022    FINDINGS:   PA and lateral views of the chest.  Postoperative changes of bilateral  lung transplantation with a clamshell sternotomy wires. Bilateral  chest tubes. Stable bilateral left greater than right pleural  effusions. Small amount of fluid in the fissures. Similar diffuse  mixed interstitial and airspace opacities. Visualized upper abdomen is  unremarkable.      Impression    IMPRESSION:   1. Interval placement of a left sided chest tube. A right basilar  chest tube remains in similar position.  2. Similar left greater than right pleural effusions.  3. No significant change in the diffuse mixed interstitial and  airspace opacities.    I have personally reviewed the examination and initial interpretation  and I agree with the findings.    KANDACE ROJAS MD         SYSTEM ID:  [id number]

## 2022-09-17 NOTE — PROGRESS NOTES
Pulmonary Medicine        Cystic Fibrosis - Lung Transplant Team            Progress Note            2022       Patient: Sofie Rodriguez  MRN: [id number]  : 1960 (age 62 year old)  Transplant: 2022 (Lung), POD#81  Admission date: 2022    Assessment & Plan:     Sofie Rodriguez is a 62-year-old female s/p BSLT (22) for COPD complicated by persistent bilateral pleural effusions, persistent CO2 retention, right hemidiaphragm palsy, C. diff colitis, gastroparesis s/p GJ tube placement (22), GI bleed 2/2 pyloric ulcer, hemobilia s/p ERCP and MRCP (), and persistent vasoplegia. Other history noatel for HFpEF, NTM colonoization, hepatitis C and methamphetamine use. Patient admitted 22 with persistent dyspnea (increased with activity), daily morning hemoptysis, and increased BLE edema. Also noted to have persistent, increased bilateral pleural effusions, diffuse bilateral groundglass changes, and more focal appearing LLL infiltrates on imaging. Treated with aggressive diuresis with some improvement in symptoms and hypoxia. Bronchoscopy () grossly normal, studies sent. S/p pigtail chest tube for right pleural effusion and aspiration of left pleural effusion ().  Hemoptysis resolved, mild hypoxia and ANAYA persisting, now s/p pigtail chest tube for left pleural effusion (9/15), currently undergoing lytics on the left with ongoing diuresis.      Recommendations:  - Diuresis per primary  - Daily CXR  - Following pending cultures, defer ABX   - Considering high dose steroids if pulmonary symptoms not improved with diuresis  - Chest tubes to suction, lytics started  evening x 6 doses  - Repeat tacrolimus level () ordered  - DSA () ordered  - VGCV for CMV ppx through   - Prednisone taper due 10/13 (not yet ordered)  - Revisit plan for PJP ppx, will consider decreased dapsone dosing of 50 mg three time/week  - Okay for full liquid diet for comfort     S/p  bilateral sequential lung transplant (BSLT) for end stage COPD:  Acute hypoxia with chronic hypercapnia:   Pulmonary edema:  Persistent bibasilar pleural effusions:   Right hemidiaphragm palsy:  Admitted with increased dyspnea with minimal exertion and small volume daily hemoptysis.  Also with marked decline in PFTs (FEV1 1.22L, 50% on 8/18 to 0.98L, 40% on 9/7).  Chest CT (9/8) with increased effusions and groundglass changes. DSA positive but stable (as below). BNP markedly elevated (30k) and also with increased BLE edema. Etiology unclear and is likely multi-factorial with DDx including pulmonary edema from hypervolemia, progressive HFpEF, rejection (ACR +/- AMR), alveolar hemorrhage, and/or infection. Aggressively diuresed with some improvement in symptoms. CXR (9/12) with stable diffuse mixed opacities and small bibasilar effusions.  Bronchoscopy (9/13) unremarkable, BAL of lingula sent. S/p right pigtail chest tube placement (9/13), transudative with moderate output initially but quickly decreasing, 80 ml out yesterday. S/p aspiration of left pleural effusion (9/13). H hemoptysis ~9/12, continues on 1L NC with notable dyspnea with ambulation, unchanged. Now s/p pigtail chest tube for left pleural effusion (9/15), started on lytics X 3 days yesterday. CXR today demonstrates L>R effusion, right stable, left slightly improved with stable opacities.   - Diuresis per primary, on diamox and will receive bumex X 1 today  - BAL studies (9/13) with GPC  - Left pleural culture (9/13, 9/15) NGTD  - Right pleural studies (9/13) NGTD  - Defer ABX at this time (bronch culture 75% monocytes, right pleural study lymph predominant 39%)   - Considering high dose steroids   - Supplemental O2 to keep >92%  - IS and Aerobika q1h w/a  - CXR daily  - Bilateral chest tubes to suction, begin lytics via left chest tube BID x6 doses, reevaluate daily  - Pain management per primary team     Immunosuppression:  - Tacrolimus 3.5 mg BID.   Goal level 8-12.  Level 9/16 subtherapeutic at 6, dose increased to 3.5 mg qAM / 4 mg qPM, repeat level 9/19  -  mg BID  - Prednisone 10 mg qAM / 7.5 mg qPM with next taper due 10/13 (not yet ordered)     Prophylaxis:   - Bactrim for PJP ppx qMWF --> discontinued 9/15 (had resumed 9/14, previously stopped d/t hyperkalemia, dapsone stopped d/t anemia, Pentamidine neb not tolerated on 9/7 after only 5 minutes d/t excessive coughing)--will consider dapsone at 50 mg three times/week  - VGCV for CMV ppx through 9/28, D+/R+, CMV (9/13) negative     Positive DSA: Newly positive on 8/10 with DQB2 mfi 2155.  Most recent DSA on 9/14 with ~stable DQB5 from prior (5744-->5825, decreased from 7033 on 9/1).  - Repeat DSA 9/21 (ordered)     EBV viremia: Low level (1374 on 9/7), not likely to be clinically significant.  - Following monthly as OP     Other relevant problems being managed by the primary team:     Diarrhea: Etiology unclear. C diff negative on 9/10.  May be medication related (MMF), but unable to transition to Myfortic given NPO and reluctant to switch to azathioprine until etiology of dyspnea is clarified. Loperamide 2 mg TID started 9/12 after 10 loose stools reported the day prior, then with no stool overnight 9/12 so held.  Goal to titrate to antidiarrheal 3 stools daily, management per primary.  Enteric stool panel negative 9/16. Notes stools are loose, but less frequent.     Severe gastroparesis:   Pyloric ulcer: Gastric emptying study 7/20 with severe gastric emptying delay (95% retention at 4 hours), s/p GJ tube placement in IR 7/27.  S/p EGD (8/3) noted to have pyloric ulcer and excessive gastric fluid and residual food.  S/p EGD (8/11) with mild erythema 2/2 GJ tube trauma seen near insertion site but no active bleeding.  - PPI BID  - TF continuous and ALL enteral medications via J tube  - G tube to gravity drainage; full liquid diet for comfort only  - Repeat EGD 10/13 to check healing of ulcer,  sooner if hgb declines further     We appreciate the excellent care provided by the [place] [id number] team.  Recommendations communicated via in person rounding and this note.  Will continue to follow along closely, please do not hesitate to call with any questions or concerns.    Patient discussed with Dr. Gong.    Kaylin Triana PA-C  [id number]    Subjective & Interval History:     Denies fever or chills, notes the pain at her chest tube site has improved. Still very short of breath with any activity. No nausea or vomiting, notes frequency of loose stools have improved.     Review of Systems:     Please see interval history, otherwise 10 point review is negative.     Physical Exam:     All notes, images, and labs from past 24 hours (at minimum) were reviewed.    Vital signs:  Temp: 98.3  F (36.8  C) Temp src: Oral BP: 103/60 Pulse: 90   Resp: 18 SpO2: 98 % O2 Device: Nasal cannula Oxygen Delivery: 1 LPM   Weight: 68.4 kg (150 lb 14.4 oz)  I/O:     Intake/Output Summary (Last 24 hours) at 9/16/2022 1318  Last data filed at 9/16/2022 1300  Gross per 24 hour   Intake 1310 ml   Output 2545 ml   Net -1235 ml     Constitutional: Lying in bed, grimacing, but cannot tell me why  HEENT: Eyes with pink conjunctivae, anicteric.  Oral mucosa moist without lesions    PULM: Decreased BS bilaterally, few scattered crackles and slightly decreased in bases  CV: Normal S1 and S2.  RRR.  + systolic murmur.  No gallop or rub.  1+ BLE edema  ABD: NABS, soft, nontender, PEG/J tube not visualized.   MSK: Moves all extremities  NEURO: Alert, conversant   SKIN: Warm, dry.  No rash on limited exam  PSYCH: Mood stable    Lines, Drains, and Devices:  Peripheral IV 09/10/22 Anterior;Left Lower forearm (Active)   Site Assessment WDL 09/16/22 1000   Line Status Saline locked 09/16/22 1000   Dressing Intervention New dressing  09/10/22 0135   Phlebitis Scale 0-->no symptoms 09/16/22 1000   Infiltration Scale 0 09/16/22 1000   Number of days: 6      Data:     LABS    CMP:   Recent Labs   Lab 09/17/22  1004 09/17/22  0732 09/17/22  0410 09/16/22  2232 09/16/22  0839 09/16/22  0606 09/15/22  1121 09/15/22  1021 09/15/22  0025 09/15/22  0024 09/14/22  0814 09/14/22  0626 09/13/22  0809 09/13/22  0624 09/11/22  1805 09/11/22  1732   NA  --  142  --   --   --  140  --  144  --   --   --  141  --  143   < > 144   POTASSIUM  --  4.7  --   --   --  4.7  --  4.7  --  5.0   < > 4.1  --  4.1   < > 3.8   CHLORIDE  --  92*  --   --   --  92*  --  94*  --   --   --  92*  --  93*   < > 94*   CO2  --  48*  --   --   --  43*  --  46*  --   --   --  45*  --  47*   < > 48*   ANIONGAP  --  2*  --   --   --  5*  --  4*  --   --   --  4*  --  3*   < > 2*   * 146* 151* 174*   < > 160*   < > 151*   < >  --    < > 219*   < > 145*   < > 178*   BUN  --  101.0*  --   --   --  100.0*  --  92.6*  --   --   --  84.5*  --  74.6*   < > 76.0*   CR  --  1.83*  --   --   --  1.83*  --  1.84*  --   --   --  1.57*  --  1.38*   < > 1.53*   GFRESTIMATED  --  31*  --   --   --  31*  --  31*  --   --   --  37*  --  44*   < > 39*   ESTUARDO  --  9.7  --   --   --  9.5  --  9.6  --   --   --  9.1  --  9.2   < > 9.2   MAG  --   --   --   --   --  2.4*  --   --   --   --   --  2.3  --   --   --   --    PHOS  --   --   --   --   --  3.5  --   --   --   --   --  3.5  --   --   --  2.3*   PROTTOTAL  --   --   --   --   --  5.9*  --  6.2*  --   --   --   --   --  5.6*  --   --    ALBUMIN  --   --   --   --   --  3.1*  --   --   --   --   --   --   --   --   --  3.3*   BILITOTAL  --   --   --   --   --  0.2  --   --   --   --   --   --   --   --   --   --    ALKPHOS  --   --   --   --   --  85  --   --   --   --   --   --   --   --   --   --    AST  --   --   --   --   --  21  --   --   --   --   --   --   --   --   --   --    ALT  --   --   --   --   --  <5*  --   --   --   --   --   --   --   --   --   --     < > = values in this interval not displayed.     CBC:   Recent Labs   Lab 09/17/22  2769  09/16/22  0606 09/15/22  1021 09/14/22  0626   WBC 7.4 6.9 6.9 7.4   RBC 2.79* 2.69* 2.60* 2.52*   HGB 8.5* 8.1* 7.9* 7.8*   HCT 30.0* 29.1* 27.9* 27.0*   * 108* 107* 107*   MCH 30.5 30.1 30.4 31.0   MCHC 28.3* 27.8* 28.3* 28.9*   RDW 19.3* 19.8* 20.0* 19.4*    224 268 265       INR:   Recent Labs   Lab 09/15/22  1317   INR 0.94       Glucose:   Recent Labs   Lab 09/17/22  1004 09/17/22  0732 09/17/22  0410 09/16/22  2232 09/16/22  1535 09/16/22  0839   * 146* 151* 174* 184* 168*       Blood Gas:   Recent Labs   Lab 09/14/22  0626 09/13/22  0624 09/12/22  0851   PHV 7.38 7.36 7.36   PCO2V 77* 82* 81*   PO2V 58* 109* 45   HCO3V 45* 47* 46*   LINDY 18.7* 17.9* 15.5*   O2PER 20 30 2       Culture Data No results for input(s): CULT in the last 168 hours.    Virology Data:   Lab Results   Component Value Date    FLUAH1 Not Detected 09/13/2022    FLUAH3 Not Detected 09/13/2022    FI9322 Not Detected 09/13/2022    IFLUB Not Detected 09/13/2022    RSVA Not Detected 09/13/2022    RSVB Not Detected 09/13/2022    PIV1 Not Detected 09/13/2022    PIV2 Not Detected 09/13/2022    PIV3 Not Detected 09/13/2022    HMPV Not Detected 09/13/2022       Historical CMV results (last 3 of prior testing):  Lab Results   Component Value Date    CMVQNT Not Detected 09/13/2022    CMVQNT Not Detected 09/07/2022    CMVQNT Not Detected 09/01/2022     No results found for: CMVLOG    Urine Studies    Recent Labs   Lab Test 08/01/22  0319 07/08/22  0831 07/05/22  1004   URINEPH 8.0* 5.5 5.5   NITRITE Negative Negative Negative   LEUKEST Negative Negative Negative   WBCU  --  1 5       Most Recent [place] Pulmonary Function Testing (FVC/FEV1 only)  FVC-Pre   Date Value Ref Range Status   09/07/2022 1.01 L    09/01/2022 1.07 L    08/24/2022 1.23 L    08/18/2022 1.33 L      FVC-%Pred-Pre   Date Value Ref Range Status   09/07/2022 33 %    09/01/2022 35 %    08/24/2022 40 %    08/18/2022 43 %      FEV1-Pre   Date Value Ref Range Status    09/07/2022 0.98 L    09/01/2022 1.02 L    08/24/2022 1.17 L    08/18/2022 1.22 L      FEV1-%Pred-Pre   Date Value Ref Range Status   09/07/2022 40 %    09/01/2022 42 %    08/24/2022 48 %    08/18/2022 50 %        IMAGING    Recent Results (from the past 48 hour(s))   XR Chest 2 Views    Narrative    EXAM: XR CHEST 2 VIEWS  9/15/2022 10:08 AM     HISTORY:  interval follow up, lung transplant, right chest tube       COMPARISON:  Chest x-ray 9/14/2022 and chest CT 9/8/2022    FINDINGS:   PA and lateral views of the chest. Postoperative changes of lung  transplantation with clamshell sternotomy wires and mediastinal  surgical clips. Stable positioning of a right basilar chest tube.  Midline trachea. Cardiomediastinal silhouette is stable. Stable small  left greater than right pleural effusions and small amount of fluid in  the right minor fissure. No pneumothorax. Mildly improved diffuse  mixed interstitial and hazy pulmonary opacities. Visualized upper  abdomen is unremarkable.      Impression    IMPRESSION:   1. Stable left greater than right bilateral pleural effusions with  right-sided chest tube in place.  2. Mildly improved diffuse mixed interstitial and pulmonary opacities.    I have personally reviewed the examination and initial interpretation  and I agree with the findings.    JOHANN MORAES MD         SYSTEM ID:  [id number]   CT Chest Tube with Cath Placement    Narrative    Procedure 9/15/2022:  CT guided left sided chest tube placement    History: Status post lung transplant with loculated left-sided fluid  collection, drainage requested.    Comparison: CT chest 9/8/2022    Operators:   Staff: Dr. Paul  Fellow: Dr. Paulo MORRISON, Dr. Manpreet Paul, was present for the critical part of the  procedure and immediately available for the entire procedure.    Medications:   Patient received local anesthesia only.    Findings/procedure:    Prior to the procedure, both verbal and written informed  consent  obtained from the patient. Patient placed right lateral decubitus on  the CT table. Preprocedure scan obtained revealing the left-sided  fluid collection and adequate percutaneous approach for drain  placement.     The left prepped and draped. Timeout performed. 1% Lidocaine used for  local analgesia. Under intermittent CT fluoroscopic guidance, a 5F  Yueh centesis needle with needle stylette advanced into the fluid  collection.     Stylette removed. There was return of serosanguineous fluid. Catheter  removed over a wire. Tract dilated to 14 Portuguese. 14 Portuguese nonlocking  J-tipped catheter advanced over the wire into the collection. Catheter  secured to the skin with a 2-0 Ethilon retention suture. Dressing  applied and catheter connected to bag drainage.      Impression    Impression:  Uncomplicated CT fluoroscopic guided left chest tube placement.    Plan: Catheter to water seal.    I have personally reviewed the examination and initial interpretation  and I agree with the findings.    MARK SOSA         SYSTEM ID:  [id number]

## 2022-09-17 NOTE — PLAN OF CARE
No significant change in status today. Pain from chest tubes sites somewhat better using prn oxycodone q4hrs. Continues with R & L CTs to sxn. Lytics infused in L chest tube bid x6 doses-tolerated position changes well this morning.   Ambulated with therapies; up to bathroom and commode w/SBA.  Large loose BM this afternoon-imodium given.  One-time IV bumex given again today. Teds on LEs. Weight trending down.  BG checks q6hrs; last 166. Pt allowed full liq diet for comforts. G tube remains to gravity drain. Tube feeds continue via J tube.  Critical lab value called of CO2=48; MD aware. No changes to cares.

## 2022-09-18 ENCOUNTER — APPOINTMENT (OUTPATIENT)
Dept: GENERAL RADIOLOGY | Facility: CLINIC | Age: 62
DRG: 205 | End: 2022-09-18
Attending: PHYSICIAN ASSISTANT
Payer: MEDICARE

## 2022-09-18 LAB
ANION GAP SERPL CALCULATED.3IONS-SCNC: 8 MMOL/L (ref 7–15)
BACTERIA PLR CULT: NO GROWTH
BACTERIA PLR CULT: NO GROWTH
BUN SERPL-MCNC: 109 MG/DL (ref 8–23)
CALCIUM SERPL-MCNC: 9.4 MG/DL (ref 8.8–10.2)
CHLORIDE SERPL-SCNC: 91 MMOL/L (ref 98–107)
CREAT SERPL-MCNC: 1.86 MG/DL (ref 0.51–0.95)
DEPRECATED HCO3 PLAS-SCNC: 43 MMOL/L (ref 22–29)
ERYTHROCYTE [DISTWIDTH] IN BLOOD BY AUTOMATED COUNT: 19.3 % (ref 10–15)
GFR SERPL CREATININE-BSD FRML MDRD: 30 ML/MIN/1.73M2
GLUCOSE BLDC GLUCOMTR-MCNC: 153 MG/DL (ref 70–99)
GLUCOSE BLDC GLUCOMTR-MCNC: 166 MG/DL (ref 70–99)
GLUCOSE BLDC GLUCOMTR-MCNC: 181 MG/DL (ref 70–99)
GLUCOSE BLDC GLUCOMTR-MCNC: 214 MG/DL (ref 70–99)
GLUCOSE SERPL-MCNC: 166 MG/DL (ref 70–99)
HCT VFR BLD AUTO: 27.1 % (ref 35–47)
HGB BLD-MCNC: 7.9 G/DL (ref 11.7–15.7)
MCH RBC QN AUTO: 31.1 PG (ref 26.5–33)
MCHC RBC AUTO-ENTMCNC: 29.2 G/DL (ref 31.5–36.5)
MCV RBC AUTO: 107 FL (ref 78–100)
PLATELET # BLD AUTO: 217 10E3/UL (ref 150–450)
POTASSIUM SERPL-SCNC: 4.8 MMOL/L (ref 3.4–5.3)
RBC # BLD AUTO: 2.54 10E6/UL (ref 3.8–5.2)
SODIUM SERPL-SCNC: 142 MMOL/L (ref 136–145)
WBC # BLD AUTO: 6.9 10E3/UL (ref 4–11)

## 2022-09-18 PROCEDURE — 36415 COLL VENOUS BLD VENIPUNCTURE: CPT | Performed by: STUDENT IN AN ORGANIZED HEALTH CARE EDUCATION/TRAINING PROGRAM

## 2022-09-18 PROCEDURE — 250N000009 HC RX 250: Performed by: STUDENT IN AN ORGANIZED HEALTH CARE EDUCATION/TRAINING PROGRAM

## 2022-09-18 PROCEDURE — 85027 COMPLETE CBC AUTOMATED: CPT | Performed by: STUDENT IN AN ORGANIZED HEALTH CARE EDUCATION/TRAINING PROGRAM

## 2022-09-18 PROCEDURE — 258N000003 HC RX IP 258 OP 636: Performed by: STUDENT IN AN ORGANIZED HEALTH CARE EDUCATION/TRAINING PROGRAM

## 2022-09-18 PROCEDURE — 250N000013 HC RX MED GY IP 250 OP 250 PS 637: Performed by: PEDIATRICS

## 2022-09-18 PROCEDURE — 214N000001 HC R&B CCU UMMC

## 2022-09-18 PROCEDURE — 250N000013 HC RX MED GY IP 250 OP 250 PS 637

## 2022-09-18 PROCEDURE — 250N000013 HC RX MED GY IP 250 OP 250 PS 637: Performed by: STUDENT IN AN ORGANIZED HEALTH CARE EDUCATION/TRAINING PROGRAM

## 2022-09-18 PROCEDURE — 250N000012 HC RX MED GY IP 250 OP 636 PS 637: Performed by: PHYSICIAN ASSISTANT

## 2022-09-18 PROCEDURE — 250N000011 HC RX IP 250 OP 636: Performed by: STUDENT IN AN ORGANIZED HEALTH CARE EDUCATION/TRAINING PROGRAM

## 2022-09-18 PROCEDURE — 99233 SBSQ HOSP IP/OBS HIGH 50: CPT | Mod: 24 | Performed by: PHYSICIAN ASSISTANT

## 2022-09-18 PROCEDURE — 250N000012 HC RX MED GY IP 250 OP 636 PS 637

## 2022-09-18 PROCEDURE — 71046 X-RAY EXAM CHEST 2 VIEWS: CPT | Mod: 26 | Performed by: RADIOLOGY

## 2022-09-18 PROCEDURE — 250N000013 HC RX MED GY IP 250 OP 250 PS 637: Performed by: NURSE PRACTITIONER

## 2022-09-18 PROCEDURE — 71046 X-RAY EXAM CHEST 2 VIEWS: CPT

## 2022-09-18 PROCEDURE — 82310 ASSAY OF CALCIUM: CPT | Performed by: STUDENT IN AN ORGANIZED HEALTH CARE EDUCATION/TRAINING PROGRAM

## 2022-09-18 PROCEDURE — 99233 SBSQ HOSP IP/OBS HIGH 50: CPT | Performed by: STUDENT IN AN ORGANIZED HEALTH CARE EDUCATION/TRAINING PROGRAM

## 2022-09-18 RX ORDER — BUMETANIDE 0.25 MG/ML
4 INJECTION INTRAMUSCULAR; INTRAVENOUS ONCE
Status: COMPLETED | OUTPATIENT
Start: 2022-09-18 | End: 2022-09-18

## 2022-09-18 RX ADMIN — Medication 50 ML: at 20:57

## 2022-09-18 RX ADMIN — NYSTATIN 1000000 UNITS: 100000 SUSPENSION ORAL at 17:35

## 2022-09-18 RX ADMIN — LOPERAMIDE HYDROCHLORIDE 2 MG: 2 CAPSULE ORAL at 21:01

## 2022-09-18 RX ADMIN — GABAPENTIN 200 MG: 100 CAPSULE ORAL at 14:33

## 2022-09-18 RX ADMIN — FOLIC ACID 1 MG: 1 TABLET ORAL at 08:54

## 2022-09-18 RX ADMIN — OXYCODONE HYDROCHLORIDE 5 MG: 5 SOLUTION ORAL at 11:17

## 2022-09-18 RX ADMIN — BUMETANIDE 4 MG: 0.25 INJECTION, SOLUTION INTRAMUSCULAR; INTRAVENOUS at 10:58

## 2022-09-18 RX ADMIN — HEPARIN SODIUM 5000 UNITS: 5000 INJECTION, SOLUTION INTRAVENOUS; SUBCUTANEOUS at 09:07

## 2022-09-18 RX ADMIN — OXYCODONE HYDROCHLORIDE 5 MG: 5 SOLUTION ORAL at 01:14

## 2022-09-18 RX ADMIN — METOPROLOL TARTRATE 50 MG: 50 TABLET, FILM COATED ORAL at 08:54

## 2022-09-18 RX ADMIN — INSULIN ASPART 1 UNITS: 100 INJECTION, SOLUTION INTRAVENOUS; SUBCUTANEOUS at 11:15

## 2022-09-18 RX ADMIN — Medication 40 MG: at 21:04

## 2022-09-18 RX ADMIN — MYCOPHENOLATE MOFETIL 750 MG: 200 POWDER, FOR SUSPENSION ORAL at 08:53

## 2022-09-18 RX ADMIN — ACETAZOLAMIDE 125 MG: 125 TABLET ORAL at 17:34

## 2022-09-18 RX ADMIN — NYSTATIN 1000000 UNITS: 100000 SUSPENSION ORAL at 21:01

## 2022-09-18 RX ADMIN — METOPROLOL TARTRATE 50 MG: 50 TABLET, FILM COATED ORAL at 21:00

## 2022-09-18 RX ADMIN — OXYCODONE HYDROCHLORIDE 5 MG: 5 SOLUTION ORAL at 06:47

## 2022-09-18 RX ADMIN — Medication 50 ML: at 10:45

## 2022-09-18 RX ADMIN — GABAPENTIN 200 MG: 100 CAPSULE ORAL at 21:01

## 2022-09-18 RX ADMIN — OXYCODONE HYDROCHLORIDE 5 MG: 5 SOLUTION ORAL at 14:33

## 2022-09-18 RX ADMIN — LOPERAMIDE HYDROCHLORIDE 2 MG: 2 CAPSULE ORAL at 06:46

## 2022-09-18 RX ADMIN — INSULIN ASPART 2 UNITS: 100 INJECTION, SOLUTION INTRAVENOUS; SUBCUTANEOUS at 15:07

## 2022-09-18 RX ADMIN — INSULIN GLARGINE 6 UNITS: 100 INJECTION, SOLUTION SUBCUTANEOUS at 08:53

## 2022-09-18 RX ADMIN — OXYCODONE HYDROCHLORIDE 5 MG: 5 SOLUTION ORAL at 21:02

## 2022-09-18 RX ADMIN — MYCOPHENOLATE MOFETIL 750 MG: 200 POWDER, FOR SUSPENSION ORAL at 21:01

## 2022-09-18 RX ADMIN — Medication 1 TABLET: at 08:54

## 2022-09-18 RX ADMIN — TACROLIMUS 4 MG: 5 CAPSULE ORAL at 17:35

## 2022-09-18 RX ADMIN — GABAPENTIN 200 MG: 100 CAPSULE ORAL at 08:54

## 2022-09-18 RX ADMIN — Medication 25 MG: at 21:00

## 2022-09-18 RX ADMIN — ACETAMINOPHEN 975 MG: 325 TABLET, FILM COATED ORAL at 08:54

## 2022-09-18 RX ADMIN — PREDNISONE 10 MG: 10 TABLET ORAL at 08:54

## 2022-09-18 RX ADMIN — CALCIUM CARBONATE 600 MG (1,500 MG)-VITAMIN D3 400 UNIT TABLET 1 TABLET: at 08:54

## 2022-09-18 RX ADMIN — HEPARIN SODIUM 5000 UNITS: 5000 INJECTION, SOLUTION INTRAVENOUS; SUBCUTANEOUS at 21:01

## 2022-09-18 RX ADMIN — CALCIUM CARBONATE 600 MG (1,500 MG)-VITAMIN D3 400 UNIT TABLET 1 TABLET: at 17:34

## 2022-09-18 RX ADMIN — ACETAMINOPHEN 975 MG: 325 TABLET, FILM COATED ORAL at 14:33

## 2022-09-18 RX ADMIN — NYSTATIN 1000000 UNITS: 100000 SUSPENSION ORAL at 08:53

## 2022-09-18 RX ADMIN — TACROLIMUS 3.5 MG: 5 CAPSULE ORAL at 08:53

## 2022-09-18 RX ADMIN — INSULIN ASPART 1 UNITS: 100 INJECTION, SOLUTION INTRAVENOUS; SUBCUTANEOUS at 06:10

## 2022-09-18 RX ADMIN — Medication 40 MG: at 08:53

## 2022-09-18 RX ADMIN — ACETAZOLAMIDE 125 MG: 125 TABLET ORAL at 08:54

## 2022-09-18 RX ADMIN — ACETAMINOPHEN 975 MG: 325 TABLET, FILM COATED ORAL at 21:00

## 2022-09-18 RX ADMIN — CYANOCOBALAMIN TAB 500 MCG 500 MCG: 500 TAB at 08:54

## 2022-09-18 RX ADMIN — INSULIN ASPART 1 UNITS: 100 INJECTION, SOLUTION INTRAVENOUS; SUBCUTANEOUS at 21:02

## 2022-09-18 RX ADMIN — PREDNISONE 7.5 MG: 2.5 TABLET ORAL at 21:00

## 2022-09-18 ASSESSMENT — ACTIVITIES OF DAILY LIVING (ADL)
ADLS_ACUITY_SCORE: 30

## 2022-09-18 NOTE — PLAN OF CARE
No significant change in status today; R/L CTs to sxn and continues on lytics BID via L chest tube with ongoing drainage.  Pain fairly well controlled with q4hr oxycodone along with scheduled tylenol and gabapentin.  Loose BM x1 today. Prn imodium.  Cr 1.86. Given another dose of IV bumex x1. TEDS on LEs during day.  Continues on TF @ 40/hr via J tube with full liquid diet for comfort as tolerated. G tube to gravity.

## 2022-09-18 NOTE — PROGRESS NOTES
[facility]    Medicine Progress Note - Hospitalist Service, GOLD TEAM 10    Date of Admission:  9/9/2022    Assessment & Plan        Sofie Rodriguez is a 62 year old female with pertinent hx of end stage COPD s/p bilateral sequential lung transplant (6/22) on triple IS ( MMF/Tacro/pred), pyloric ulcer, recent ERCP c/f hemobilia, gastroparesis s/p GJ tube placement and Percutaneous J tube presents for a planned admission from transplant pulmonology to work up antibody medicated rejection versus infection.     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   Recent imaging - 9/8/22 CT chest - R>L pleural effusions with bilateral hazy                              nodular opacities and paraseptal thickening.   PFT - FEV1 40%, FVC - 33% FEV1/FVC 79%  Immune suppression - Tacrolimus 3.5 mg BID Goal level 8-12,  mg                                         BID ( decreased dose in the setting of GI symptoms),                                        prednisone taper currently on 10 mg BID until 9/15/22.  CMV ppx - Valgancyclovir 450 mg TID ( ending 9/28)   Oral candida ppx - Nystatin   -S/p right pigtail chest tube placement on 9/13, left thoracentesis on 9/13   Plan:  >Chest tube to drainage - left and right  >Chest tube lytics - tPa/dornase BID for 6 doses - started on 9/16  >Lidoderm patch for chest tube  >Gabaentin 200 mg TID for chest pain, oxycodone prn  >IV diuresis    #Acute on Chronic HFpEF  Echo 8/15/22 - EF 60-65% with trace mitral insufficiency, admission BNP  > 30K   Plan:  IV bumex, PO acetazolamide    # Diarrhea, Loose stools  # hx of C diff   Patient is on MMF with chronic loose stools at baseline. Notes reflect recent dose reduction of MMF for this.  -C diff (negtaive on 9/10)  Plan:  >Immodium prn    #Steroid induced hyperglycemia  -Lantus 5 units, sliding scale q6h    # Pyloric ulcer EGD 8/3/2022  # Acute on chronic macrocytic anemia    hx of coffee ground emesis. Patient had a EGD 8/3 which showed a Clear base ulcer in the pyloric channel identified as the culprit lesion. At the time was also noted to have excess gastric accumulation 2/2 pyloric inflammation. Vent G tube placed to allow for drainage. Vented to gravity now.  repeat EGD planned in October 2022 to check healing.  Patient's hgb hit loco 6.3 9/1 and since then in the 7s-8s. 7.1 this admission.   Plan:  - Pantoprazole 40 mg BID     - patient consented for blood transfusion, type and screen ordered.     # extra hepatic and intrahepatic biliary dilation c/f hemobilia   # Intra ductal papillary mucinous neoplasm   ERCP 8/11 showed hemobilia.   - Monitor LFTs     #CKD stage III   Patient has been variable GFRs 30-50s in the last few months. Most recent Cr trend 1.5-1.9 1.56 9/8/22.   - CTM     #Severe gastroparesis s/p  GJ tube placement 7/27/2022  - RD nutrition consult placed for TF  - Percutaneous j tube in place with G venting to gravity     #HTN  # A flutter with RVR/SVT   . Has recently completed zio patch.  - Continue PTA metoprolol 50 mg BID          Diet: Adult Formula Drip Feeding: Continuous Nepro with Carbsteady; Jejunostomy; Goal Rate: 40 ml/hr--okay to begin at goal once new formula arrives on unit.; mL/hr; Medication - Feeding Tube Flush Frequency: At least 15-30 mL water before and after med...  Full Liquid Diet    DVT Prophylaxis: heparin  Dong Catheter: Not present  Central Lines: None  Cardiac Monitoring: None  Code Status: Full Code      Disposition Plan     Expected Discharge Date:  09/21/2022        Discharge Comments: Bilateral chest tube placement, last one placed on 9/15. Both need to be removed prior to dispo.        The patient's care was discussed with the Bedside Nurse, Patient and Pumonary transplant Team.    Chidi Fay MD  Hospitalist Service, GOLD TEAM 10  [facility]  Securely message with the Vocera Web Console (learn more here)  Text page via [facility] Paging/Directory   Please see signed in provider for up to date coverage information      Clinically Significant Risk Factors Present on Admission                       ______________________________________________________________________    Interval History   No acute events overnight. Reports her breathing is similar. She states chest pain has improved with current pain regiment. She had some loose stools but took immodium. No fever or chills. No change in cough or sputum production. Reports hesistancy to remove chest tubes too early.     Data reviewed today: I reviewed all medications, new labs and imaging results over the last 24 hours.    Physical Exam   Vital Signs: Temp: 98.6  F (37  C) Temp src: Oral BP: 113/59 Pulse: 80   Resp: 18 SpO2: 99 % O2 Device: Nasal cannula Oxygen Delivery: 1 LPM  Weight: 150 lbs 14.4 oz     Constitutional: awake, tired after procedure, somnolent but awakens to voice  Neck: supple  Respiratory: crackles in lower posterior lung fields bilaterally , right chest tube to atrium and wall suction, left chest tube to atrium and wall suction  Cardiovascular: Regular rate and rhythm, normal S1, pronounced s2, systolic murmur 2/6  GI: Normal bowel sounds, soft, non-distended, non-tender, GJ placement  Back: no pre sacral edema  Ext: 1+ pitting edema in lower extremities   Neuropsychiatric: moving all extremities, no focal deficit, holding eye contact  Data   Recent Results (from the past 24 hour(s))   XR Chest 2 Views    Narrative     Examination:  XR CHEST  2 VIEWS     Date:  9/17/2022 1:12 PM      Clinical Information: interval follow up, lung transplant, right  chest tube     Additional Information: none    Comparison: 9/16/2022    Findings:   Clamshell sternotomy, bilateral lung transplant. Bilateral chest  tubes. No focal opacities in the lungs. Small biapical pleural  effusions. Fluid in the minor fissure. Blunted left costophrenic  angle. Interstitial prominence. Cardiac silhouette slightly enlarged.  No acute osseous injury.      Impression    Impression:  1. No significant change bilateral lung transplant with bibasilar  chest tubes. Biapical loculated pleural effusions persist. The left  basal lateral perfusion not significantly changed.     MONSERRAT SEGAL MD         SYSTEM ID:  [id number]

## 2022-09-18 NOTE — PROGRESS NOTES
D:pt rates pain with turning and movement in back of chest  I:continue with prn meds and schedule  A:rates it between 4-7 sharp ache  P:continue to pace activities use commode

## 2022-09-18 NOTE — PROGRESS NOTES
Pulmonary Medicine        Cystic Fibrosis - Lung Transplant Team            Progress Note            2022       Patient: Sofie Rodriguez  MRN: [id number]  : 1960 (age 62 year old)  Transplant: 2022 (Lung), POD#82  Admission date: 2022    Assessment & Plan:     Sofie Rodriguez is a 62-year-old female s/p BSLT (22) for COPD complicated by persistent bilateral pleural effusions, persistent CO2 retention, right hemidiaphragm palsy, C. diff colitis, gastroparesis s/p GJ tube placement (22), GI bleed 2/2 pyloric ulcer, hemobilia s/p ERCP and MRCP (), and persistent vasoplegia. Other history notable for HFpEF, NTM colonoization, hepatitis C and methamphetamine use. Patient admitted 22 with persistent dyspnea (increased with activity), daily morning hemoptysis, and increased BLE edema. Also noted to have persistent, increased bilateral pleural effusions, diffuse bilateral groundglass changes, and more focal appearing LLL infiltrates on imaging. Treated with aggressive diuresis with some improvement in symptoms and hypoxia. Bronchoscopy () grossly normal, studies sent, no biopsies. S/p right pigtail chest tube and aspiration of left pleural effusion (). Hemoptysis resolved, mild hypoxia and ANAYA persisting, now s/p left pigtail chest tube (9/15), currently undergoing lytics on the left with ongoing diuresis. Output and imaging relatively stable.      Recommendations:  - Diuresis per primary, will get Bumex again today  - Daily CXR; will plan for CT once we have completed diuresis to help guide possible steroids  - Defer ABX, culture no growth  - Repeat tacrolimus level () ordered  - DSA () ordered  - VGCV for CMV ppx through   - Prednisone taper due 10/13 (not yet ordered)  - Revisit plan for PJP ppx, will consider decreased dapsone dosing of 50 mg three time/week     S/p bilateral sequential lung transplant (BSLT) for end stage COPD:  Acute hypoxia with  chronic hypercapnia:   Pulmonary edema:  Persistent bibasilar pleural effusions:   Right hemidiaphragm palsy: Admitted with increased dyspnea with minimal exertion and small volume daily hemoptysis. Also with marked decline in PFTs (FEV1 1.22L, 50% on 8/18 to 0.98L, 40% on 9/7). Chest CT (9/8) with increased effusions and groundglass changes. DSA positive but stable (as below). BNP markedly elevated (30k) and also with increased BLE edema. Etiology unclear and is likely multi-factorial with DDx including pulmonary edema from hypervolemia/progressive HFpEF, rejection (ACR +/- AMR), alveolar hemorrhage, and/or infection. Aggressively diuresed with some improvement in symptoms. Bronchoscopy (9/13) unremarkable, BAL of lingula sent, cultures no growth. S/p right pigtail chest tube placement (9/13), transudative with moderate output initially but quickly decreasing, 70 ml out yesterday. S/p aspiration of left pleural effusion (9/13). Hemoptysis ~9/12, continues on 1L NC with notable dyspnea with ambulation, unchanged. Now s/p pigtail chest tube for left pleural effusion (9/15), started on lytics X 3 days  (through 9/19 am) CXR today reviewed and demonstrates small to resolved right effusion and ongoing small left effusion. Per discussion, will plan for ongoing diuresis as much as able, then CT chest to better assess parenchyma.  - Diuresis per primary, on scheduled diamox, bumex daily prn renal function  - BAL studies (9/13) with GPC  - Left pleural culture (9/13, 9/15) NGTD  - Right pleural studies (9/13) NGTD  - Defer ABX at this time   - Considering high dose steroids  - Supplemental O2 to keep >92%  - IS and Aerobika q1h w/a  - CXR daily  - Bilateral chest tubes to suction, lytics via left chest tube BID x6 doses, reevaluate daily  - Pain management per primary team     Immunosuppression:  - Tacrolimus 3.5 mg BID.  Goal level 8-12.  Level 9/16 subtherapeutic at 6, dose increased to 3.5 mg qAM / 4 mg qPM, repeat level  9/19  -  mg BID  - Prednisone 10 mg qAM / 7.5 mg qPM with next taper due 10/13 (not yet ordered)     Prophylaxis:   - Bactrim for PJP ppx qMWF --> discontinued 9/15 (had resumed 9/14, previously stopped d/t hyperkalemia, dapsone stopped d/t anemia, Pentamidine neb not tolerated on 9/7 after only 5 minutes d/t excessive coughing)--will consider dapsone at 50 mg three times/week  - VGCV for CMV ppx through 9/28, D+/R+, CMV (9/13) negative     Positive DSA: Newly positive on 8/10 with DQB2 mfi 2155.  Most recent DSA on 9/14 with ~stable DQB5 from prior (5744-->5825, decreased from 7033 on 9/1).  - Repeat DSA 9/21 (ordered)     EBV viremia: Low level (1374 on 9/7), not likely to be clinically significant.  - Following monthly as OP     Other relevant problems being managed by the primary team:     Diarrhea: Etiology unclear. C diff negative on 9/10.  May be medication related (MMF), but unable to transition to Myfortic given NPO and reluctant to switch to azathioprine until etiology of dyspnea is clarified. Loperamide 2 mg TID started 9/12 after 10 loose stools reported the day prior, then with no stool overnight 9/12 so held.  Goal to titrate to antidiarrheal 3 stools daily, management per primary.  Enteric stool panel negative 9/16. Notes stools are loose, but less frequent.     Severe gastroparesis:   Pyloric ulcer: Gastric emptying study 7/20 with severe gastric emptying delay (95% retention at 4 hours), s/p GJ tube placement in IR 7/27.  S/p EGD (8/3) noted to have pyloric ulcer and excessive gastric fluid and residual food.  S/p EGD (8/11) with mild erythema 2/2 GJ tube trauma seen near insertion site but no active bleeding.  - PPI BID  - TF continuous and ALL enteral medications via J tube  - G tube to gravity drainage; full liquid diet for comfort only  - Repeat EGD 10/13 to check healing of ulcer, sooner if hgb declines further     We appreciate the excellent care provided by the [place] [id number] team.   Recommendations communicated via in person rounding and this note.  Will continue to follow along closely, please do not hesitate to call with any questions or concerns.    Patient discussed with Dr. Gong.    Kaylin Triana PA-C  [id number]    Subjective & Interval History:     Doing okay, no fever or chills, pain at chest tube insertion site is controlled. No new shortness of breath, still short of breath with activity. No nausea or vomiting, stools loose.    Review of Systems:     Please see interval history, otherwise 10 point review is negative.     Physical Exam:     All notes, images, and labs from past 24 hours (at minimum) were reviewed.    Vital signs:  Temp: 98.4  F (36.9  C) Temp src: Oral BP: 113/68 Pulse: 78   Resp: 18 SpO2: 98 % O2 Device: Nasal cannula Oxygen Delivery: 1 LPM   Weight: 67.9 kg (149 lb 9.6 oz)  I/O:     Intake/Output Summary (Last 24 hours) at 9/16/2022 1318  Last data filed at 9/16/2022 1300  Gross per 24 hour   Intake 1310 ml   Output 2545 ml   Net -1235 ml     Constitutional: Lying in bed, no acute distress   HEENT: Eyes with pink conjunctivae, anicteric.  Oral mucosa moist without lesions    PULM: Decreased BS bilaterally, few scattered crackles and slightly decreased in bases  CV: Normal S1 and S2.  RRR.  + systolic murmur.  No gallop or rub.  1+ BLE edema  ABD: NABS, soft, nontender, PEG/J tube not visualized  MSK: Moves all extremities  NEURO: Alert, conversant   SKIN: Warm, dry.  No rash on limited exam  PSYCH: Mood stable    Lines, Drains, and Devices:  Peripheral IV 09/10/22 Anterior;Left Lower forearm (Active)   Site Assessment WDL 09/16/22 1000   Line Status Saline locked 09/16/22 1000   Dressing Intervention New dressing  09/10/22 0135   Phlebitis Scale 0-->no symptoms 09/16/22 1000   Infiltration Scale 0 09/16/22 1000   Number of days: 6     Data:     LABS    CMP:   Recent Labs   Lab 09/18/22  0608 09/17/22  2212 09/17/22  1606 09/17/22  1004 09/17/22  0732 09/16/22  0839  09/16/22  0606 09/15/22  1121 09/15/22  1021 09/15/22  0025 09/15/22  0024 09/14/22  0814 09/14/22  0626 09/13/22  0809 09/13/22  0624 09/11/22  1805 09/11/22  1732   NA  --   --   --   --  142  --  140  --  144  --   --   --  141  --  143   < > 144   POTASSIUM  --   --   --   --  4.7  --  4.7  --  4.7  --  5.0   < > 4.1  --  4.1   < > 3.8   CHLORIDE  --   --   --   --  92*  --  92*  --  94*  --   --   --  92*  --  93*   < > 94*   CO2  --   --   --   --  48*  --  43*  --  46*  --   --   --  45*  --  47*   < > 48*   ANIONGAP  --   --   --   --  2*  --  5*  --  4*  --   --   --  4*  --  3*   < > 2*   * 166* 242* 166* 146*   < > 160*   < > 151*   < >  --    < > 219*   < > 145*   < > 178*   BUN  --   --   --   --  101.0*  --  100.0*  --  92.6*  --   --   --  84.5*  --  74.6*   < > 76.0*   CR  --   --   --   --  1.83*  --  1.83*  --  1.84*  --   --   --  1.57*  --  1.38*   < > 1.53*   GFRESTIMATED  --   --   --   --  31*  --  31*  --  31*  --   --   --  37*  --  44*   < > 39*   ESTUARDO  --   --   --   --  9.7  --  9.5  --  9.6  --   --   --  9.1  --  9.2   < > 9.2   MAG  --   --   --   --   --   --  2.4*  --   --   --   --   --  2.3  --   --   --   --    PHOS  --   --   --   --   --   --  3.5  --   --   --   --   --  3.5  --   --   --  2.3*   PROTTOTAL  --   --   --   --   --   --  5.9*  --  6.2*  --   --   --   --   --  5.6*  --   --    ALBUMIN  --   --   --   --   --   --  3.1*  --   --   --   --   --   --   --   --   --  3.3*   BILITOTAL  --   --   --   --   --   --  0.2  --   --   --   --   --   --   --   --   --   --    ALKPHOS  --   --   --   --   --   --  85  --   --   --   --   --   --   --   --   --   --    AST  --   --   --   --   --   --  21  --   --   --   --   --   --   --   --   --   --    ALT  --   --   --   --   --   --  <5*  --   --   --   --   --   --   --   --   --   --     < > = values in this interval not displayed.     CBC:   Recent Labs   Lab 09/18/22  0619 09/17/22  0732 09/16/22  0606  09/15/22  1021   WBC 6.9 7.4 6.9 6.9   RBC 2.54* 2.79* 2.69* 2.60*   HGB 7.9* 8.5* 8.1* 7.9*   HCT 27.1* 30.0* 29.1* 27.9*   * 108* 108* 107*   MCH 31.1 30.5 30.1 30.4   MCHC 29.2* 28.3* 27.8* 28.3*   RDW 19.3* 19.3* 19.8* 20.0*    236 224 268       INR:   Recent Labs   Lab 09/15/22  1317   INR 0.94       Glucose:   Recent Labs   Lab 09/18/22  0608 09/17/22  2212 09/17/22  1606 09/17/22  1004 09/17/22  0732 09/17/22  0410   * 166* 242* 166* 146* 151*       Blood Gas:   Recent Labs   Lab 09/14/22  0626 09/13/22  0624 09/12/22  0851   PHV 7.38 7.36 7.36   PCO2V 77* 82* 81*   PO2V 58* 109* 45   HCO3V 45* 47* 46*   LINDY 18.7* 17.9* 15.5*   O2PER 20 30 2       Culture Data No results for input(s): CULT in the last 168 hours.    Virology Data:   Lab Results   Component Value Date    FLUAH1 Not Detected 09/13/2022    FLUAH3 Not Detected 09/13/2022    QU2192 Not Detected 09/13/2022    IFLUB Not Detected 09/13/2022    RSVA Not Detected 09/13/2022    RSVB Not Detected 09/13/2022    PIV1 Not Detected 09/13/2022    PIV2 Not Detected 09/13/2022    PIV3 Not Detected 09/13/2022    HMPV Not Detected 09/13/2022       Historical CMV results (last 3 of prior testing):  Lab Results   Component Value Date    CMVQNT Not Detected 09/13/2022    CMVQNT Not Detected 09/07/2022    CMVQNT Not Detected 09/01/2022     No results found for: CMVLOG    Urine Studies    Recent Labs   Lab Test 08/01/22  0319 07/08/22  0831 07/05/22  1004   URINEPH 8.0* 5.5 5.5   NITRITE Negative Negative Negative   LEUKEST Negative Negative Negative   WBCU  --  1 5       Most Recent [place] Pulmonary Function Testing (FVC/FEV1 only)  FVC-Pre   Date Value Ref Range Status   09/07/2022 1.01 L    09/01/2022 1.07 L    08/24/2022 1.23 L    08/18/2022 1.33 L      FVC-%Pred-Pre   Date Value Ref Range Status   09/07/2022 33 %    09/01/2022 35 %    08/24/2022 40 %    08/18/2022 43 %      FEV1-Pre   Date Value Ref Range Status   09/07/2022 0.98 L     09/01/2022 1.02 L    08/24/2022 1.17 L    08/18/2022 1.22 L      FEV1-%Pred-Pre   Date Value Ref Range Status   09/07/2022 40 %    09/01/2022 42 %    08/24/2022 48 %    08/18/2022 50 %        IMAGING    Recent Results (from the past 48 hour(s))   XR Chest 2 Views    Narrative    EXAM: XR CHEST 2 VIEWS  9/15/2022 10:08 AM     HISTORY:  interval follow up, lung transplant, right chest tube       COMPARISON:  Chest x-ray 9/14/2022 and chest CT 9/8/2022    FINDINGS:   PA and lateral views of the chest. Postoperative changes of lung  transplantation with clamshell sternotomy wires and mediastinal  surgical clips. Stable positioning of a right basilar chest tube.  Midline trachea. Cardiomediastinal silhouette is stable. Stable small  left greater than right pleural effusions and small amount of fluid in  the right minor fissure. No pneumothorax. Mildly improved diffuse  mixed interstitial and hazy pulmonary opacities. Visualized upper  abdomen is unremarkable.      Impression    IMPRESSION:   1. Stable left greater than right bilateral pleural effusions with  right-sided chest tube in place.  2. Mildly improved diffuse mixed interstitial and pulmonary opacities.    I have personally reviewed the examination and initial interpretation  and I agree with the findings.    JOHANN MORAES MD         SYSTEM ID:  [id number]   CT Chest Tube with Cath Placement    Narrative    Procedure 9/15/2022:  CT guided left sided chest tube placement    History: Status post lung transplant with loculated left-sided fluid  collection, drainage requested.    Comparison: CT chest 9/8/2022    Operators:   Staff: Dr. Paul  Fellow: Dr. Paulo MORRISON, Dr. Manpreet Paul, was present for the critical part of the  procedure and immediately available for the entire procedure.    Medications:   Patient received local anesthesia only.    Findings/procedure:    Prior to the procedure, both verbal and written informed consent  obtained from the  patient. Patient placed right lateral decubitus on  the CT table. Preprocedure scan obtained revealing the left-sided  fluid collection and adequate percutaneous approach for drain  placement.     The left prepped and draped. Timeout performed. 1% Lidocaine used for  local analgesia. Under intermittent CT fluoroscopic guidance, a 5F  Ariel Way centesis needle with needle stylette advanced into the fluid  collection.     Stylette removed. There was return of serosanguineous fluid. Catheter  removed over a wire. Tract dilated to 14 Serbian. 14 Serbian nonlocking  J-tipped catheter advanced over the wire into the collection. Catheter  secured to the skin with a 2-0 Ethilon retention suture. Dressing  applied and catheter connected to bag drainage.      Impression    Impression:  Uncomplicated CT fluoroscopic guided left chest tube placement.    Plan: Catheter to water seal.    I have personally reviewed the examination and initial interpretation  and I agree with the findings.    MARK SOSA         SYSTEM ID:  [id number]

## 2022-09-18 NOTE — PLAN OF CARE
Diagnosis: BSLT 6/28 c/b by pleural effusions. Admitted 9/9 w/ increased dyspnea, morning hemoptysis, increased BLE edema.     Vitals: /55 (BP Location: Left arm, Cuff Size: Adult Regular)   Pulse 92   Temp 98.9  F (37.2  C) (Oral)   Resp 20   Wt 68.4 kg (150 lb 14.4 oz)   SpO2 96%   BMI 26.73 kg/m        Neuro: A/o x4, denies headache. Baseline shaky.   Resp: 1L NC, sating high 90's. ANAYA.   Cardiac: sinus rhythm, pressures stable.   GI/: GJ tube w/ TF running 40 mL/hr at goal. Tube feeds and medications through J, G to gravity. Loose stools. PRN Imodium.   Skin: compression stockings taken off overnight.   LDAs: 2 CT to 2 atriums -20 mmH2O. GJ tube. LPIV saline locked.   Mobility: x1 w/ walker.     Goals/Plan: continue plan of care, notify team of any significant changes

## 2022-09-19 ENCOUNTER — APPOINTMENT (OUTPATIENT)
Dept: GENERAL RADIOLOGY | Facility: CLINIC | Age: 62
DRG: 205 | End: 2022-09-19
Attending: PHYSICIAN ASSISTANT
Payer: MEDICARE

## 2022-09-19 ENCOUNTER — APPOINTMENT (OUTPATIENT)
Dept: PHYSICAL THERAPY | Facility: CLINIC | Age: 62
DRG: 205 | End: 2022-09-19
Attending: INTERNAL MEDICINE
Payer: MEDICARE

## 2022-09-19 LAB
ACID FAST STAIN (ARUP): NORMAL
ALBUMIN SERPL BCG-MCNC: 3.2 G/DL (ref 3.5–5.2)
ALBUMIN UR-MCNC: NEGATIVE MG/DL
ALP SERPL-CCNC: 90 U/L (ref 35–104)
ALT SERPL W P-5'-P-CCNC: 6 U/L (ref 10–35)
AMMONIA PLAS-SCNC: 24 UMOL/L (ref 11–51)
ANION GAP SERPL CALCULATED.3IONS-SCNC: 4 MMOL/L (ref 7–15)
APPEARANCE UR: ABNORMAL
AST SERPL W P-5'-P-CCNC: 13 U/L (ref 10–35)
BACTERIA #/AREA URNS HPF: ABNORMAL /HPF
BASE EXCESS BLDV CALC-SCNC: 20.8 MMOL/L (ref -7.7–1.9)
BILIRUB DIRECT SERPL-MCNC: <0.2 MG/DL (ref 0–0.3)
BILIRUB SERPL-MCNC: <0.2 MG/DL
BILIRUB UR QL STRIP: NEGATIVE
BUN SERPL-MCNC: 107 MG/DL (ref 8–23)
CALCIUM SERPL-MCNC: 9.3 MG/DL (ref 8.8–10.2)
CHLORIDE SERPL-SCNC: 89 MMOL/L (ref 98–107)
COLOR UR AUTO: YELLOW
CREAT SERPL-MCNC: 1.78 MG/DL (ref 0.51–0.95)
DEPRECATED HCO3 PLAS-SCNC: 47 MMOL/L (ref 22–29)
ERYTHROCYTE [DISTWIDTH] IN BLOOD BY AUTOMATED COUNT: 19.2 % (ref 10–15)
GFR SERPL CREATININE-BSD FRML MDRD: 32 ML/MIN/1.73M2
GLUCOSE BLDC GLUCOMTR-MCNC: 125 MG/DL (ref 70–99)
GLUCOSE BLDC GLUCOMTR-MCNC: 157 MG/DL (ref 70–99)
GLUCOSE BLDC GLUCOMTR-MCNC: 173 MG/DL (ref 70–99)
GLUCOSE BLDC GLUCOMTR-MCNC: 185 MG/DL (ref 70–99)
GLUCOSE BLDC GLUCOMTR-MCNC: 190 MG/DL (ref 70–99)
GLUCOSE SERPL-MCNC: 176 MG/DL (ref 70–99)
GLUCOSE UR STRIP-MCNC: NEGATIVE MG/DL
HCO3 BLDV-SCNC: 51 MMOL/L (ref 21–28)
HCT VFR BLD AUTO: 27.5 % (ref 35–47)
HGB BLD-MCNC: 7.8 G/DL (ref 11.7–15.7)
HGB UR QL STRIP: ABNORMAL
KETONES UR STRIP-MCNC: NEGATIVE MG/DL
LEUKOCYTE ESTERASE UR QL STRIP: ABNORMAL
MAGNESIUM SERPL-MCNC: 2.7 MG/DL (ref 1.7–2.3)
MCH RBC QN AUTO: 30.5 PG (ref 26.5–33)
MCHC RBC AUTO-ENTMCNC: 28.4 G/DL (ref 31.5–36.5)
MCV RBC AUTO: 107 FL (ref 78–100)
MUCOUS THREADS #/AREA URNS LPF: PRESENT /LPF
NITRATE UR QL: NEGATIVE
O2/TOTAL GAS SETTING VFR VENT: 20 %
PCO2 BLDV: 99 MM HG (ref 40–50)
PH BLDV: 7.32 [PH] (ref 7.32–7.43)
PH UR STRIP: 7 [PH] (ref 5–7)
PHOSPHATE SERPL-MCNC: 3.9 MG/DL (ref 2.5–4.5)
PLATELET # BLD AUTO: 206 10E3/UL (ref 150–450)
PO2 BLDV: 25 MM HG (ref 25–47)
POTASSIUM SERPL-SCNC: 4.5 MMOL/L (ref 3.4–5.3)
PROCALCITONIN SERPL IA-MCNC: 0.1 NG/ML
PROT SERPL-MCNC: 5.7 G/DL (ref 6.4–8.3)
RBC # BLD AUTO: 2.56 10E6/UL (ref 3.8–5.2)
RBC URINE: 3 /HPF
SODIUM SERPL-SCNC: 140 MMOL/L (ref 136–145)
SP GR UR STRIP: 1.01 (ref 1–1.03)
SQUAMOUS EPITHELIAL: 4 /HPF
TACROLIMUS BLD-MCNC: 5.3 UG/L (ref 5–15)
TME LAST DOSE: NORMAL H
TME LAST DOSE: NORMAL H
UROBILINOGEN UR STRIP-MCNC: NORMAL MG/DL
WBC # BLD AUTO: 7 10E3/UL (ref 4–11)
WBC URINE: 29 /HPF

## 2022-09-19 PROCEDURE — 250N000012 HC RX MED GY IP 250 OP 636 PS 637: Performed by: STUDENT IN AN ORGANIZED HEALTH CARE EDUCATION/TRAINING PROGRAM

## 2022-09-19 PROCEDURE — 250N000013 HC RX MED GY IP 250 OP 250 PS 637

## 2022-09-19 PROCEDURE — 99233 SBSQ HOSP IP/OBS HIGH 50: CPT | Mod: 24 | Performed by: NURSE PRACTITIONER

## 2022-09-19 PROCEDURE — 250N000009 HC RX 250: Performed by: STUDENT IN AN ORGANIZED HEALTH CARE EDUCATION/TRAINING PROGRAM

## 2022-09-19 PROCEDURE — 94660 CPAP INITIATION&MGMT: CPT

## 2022-09-19 PROCEDURE — 80197 ASSAY OF TACROLIMUS: CPT | Performed by: PHYSICIAN ASSISTANT

## 2022-09-19 PROCEDURE — 82310 ASSAY OF CALCIUM: CPT | Performed by: STUDENT IN AN ORGANIZED HEALTH CARE EDUCATION/TRAINING PROGRAM

## 2022-09-19 PROCEDURE — 87040 BLOOD CULTURE FOR BACTERIA: CPT | Performed by: STUDENT IN AN ORGANIZED HEALTH CARE EDUCATION/TRAINING PROGRAM

## 2022-09-19 PROCEDURE — 71046 X-RAY EXAM CHEST 2 VIEWS: CPT

## 2022-09-19 PROCEDURE — 85027 COMPLETE CBC AUTOMATED: CPT | Performed by: STUDENT IN AN ORGANIZED HEALTH CARE EDUCATION/TRAINING PROGRAM

## 2022-09-19 PROCEDURE — 250N000011 HC RX IP 250 OP 636: Performed by: STUDENT IN AN ORGANIZED HEALTH CARE EDUCATION/TRAINING PROGRAM

## 2022-09-19 PROCEDURE — 99233 SBSQ HOSP IP/OBS HIGH 50: CPT | Performed by: STUDENT IN AN ORGANIZED HEALTH CARE EDUCATION/TRAINING PROGRAM

## 2022-09-19 PROCEDURE — 250N000013 HC RX MED GY IP 250 OP 250 PS 637: Performed by: NURSE PRACTITIONER

## 2022-09-19 PROCEDURE — 258N000003 HC RX IP 258 OP 636: Performed by: STUDENT IN AN ORGANIZED HEALTH CARE EDUCATION/TRAINING PROGRAM

## 2022-09-19 PROCEDURE — 36600 WITHDRAWAL OF ARTERIAL BLOOD: CPT

## 2022-09-19 PROCEDURE — 36415 COLL VENOUS BLD VENIPUNCTURE: CPT | Performed by: STUDENT IN AN ORGANIZED HEALTH CARE EDUCATION/TRAINING PROGRAM

## 2022-09-19 PROCEDURE — 250N000009 HC RX 250: Performed by: NURSE PRACTITIONER

## 2022-09-19 PROCEDURE — 250N000013 HC RX MED GY IP 250 OP 250 PS 637: Performed by: PEDIATRICS

## 2022-09-19 PROCEDURE — 250N000012 HC RX MED GY IP 250 OP 636 PS 637

## 2022-09-19 PROCEDURE — 97110 THERAPEUTIC EXERCISES: CPT | Mod: GP

## 2022-09-19 PROCEDURE — 999N000157 HC STATISTIC RCP TIME EA 10 MIN

## 2022-09-19 PROCEDURE — 71046 X-RAY EXAM CHEST 2 VIEWS: CPT | Mod: 26 | Performed by: RADIOLOGY

## 2022-09-19 PROCEDURE — 83735 ASSAY OF MAGNESIUM: CPT | Performed by: STUDENT IN AN ORGANIZED HEALTH CARE EDUCATION/TRAINING PROGRAM

## 2022-09-19 PROCEDURE — 82140 ASSAY OF AMMONIA: CPT | Performed by: STUDENT IN AN ORGANIZED HEALTH CARE EDUCATION/TRAINING PROGRAM

## 2022-09-19 PROCEDURE — 87086 URINE CULTURE/COLONY COUNT: CPT | Performed by: STUDENT IN AN ORGANIZED HEALTH CARE EDUCATION/TRAINING PROGRAM

## 2022-09-19 PROCEDURE — 214N000001 HC R&B CCU UMMC

## 2022-09-19 PROCEDURE — 84145 PROCALCITONIN (PCT): CPT | Performed by: STUDENT IN AN ORGANIZED HEALTH CARE EDUCATION/TRAINING PROGRAM

## 2022-09-19 PROCEDURE — 80053 COMPREHEN METABOLIC PANEL: CPT | Performed by: STUDENT IN AN ORGANIZED HEALTH CARE EDUCATION/TRAINING PROGRAM

## 2022-09-19 PROCEDURE — 84100 ASSAY OF PHOSPHORUS: CPT | Performed by: STUDENT IN AN ORGANIZED HEALTH CARE EDUCATION/TRAINING PROGRAM

## 2022-09-19 PROCEDURE — 250N000012 HC RX MED GY IP 250 OP 636 PS 637: Performed by: PHYSICIAN ASSISTANT

## 2022-09-19 PROCEDURE — 82805 BLOOD GASES W/O2 SATURATION: CPT | Performed by: INTERNAL MEDICINE

## 2022-09-19 PROCEDURE — 81001 URINALYSIS AUTO W/SCOPE: CPT | Performed by: STUDENT IN AN ORGANIZED HEALTH CARE EDUCATION/TRAINING PROGRAM

## 2022-09-19 PROCEDURE — 82803 BLOOD GASES ANY COMBINATION: CPT | Performed by: STUDENT IN AN ORGANIZED HEALTH CARE EDUCATION/TRAINING PROGRAM

## 2022-09-19 PROCEDURE — 250N000013 HC RX MED GY IP 250 OP 250 PS 637: Performed by: STUDENT IN AN ORGANIZED HEALTH CARE EDUCATION/TRAINING PROGRAM

## 2022-09-19 RX ORDER — CARBOXYMETHYLCELLULOSE SODIUM 5 MG/ML
1 SOLUTION/ DROPS OPHTHALMIC
Status: DISCONTINUED | OUTPATIENT
Start: 2022-09-19 | End: 2022-10-08 | Stop reason: HOSPADM

## 2022-09-19 RX ORDER — BUMETANIDE 0.25 MG/ML
4 INJECTION INTRAMUSCULAR; INTRAVENOUS ONCE
Status: COMPLETED | OUTPATIENT
Start: 2022-09-19 | End: 2022-09-19

## 2022-09-19 RX ADMIN — FOLIC ACID 1 MG: 1 TABLET ORAL at 08:10

## 2022-09-19 RX ADMIN — INSULIN GLARGINE 6 UNITS: 100 INJECTION, SOLUTION SUBCUTANEOUS at 11:05

## 2022-09-19 RX ADMIN — MENTHOL 1 PATCH: 205.5 PATCH TOPICAL at 08:10

## 2022-09-19 RX ADMIN — Medication 40 MG: at 08:08

## 2022-09-19 RX ADMIN — CYANOCOBALAMIN TAB 500 MCG 500 MCG: 500 TAB at 08:08

## 2022-09-19 RX ADMIN — ACETAMINOPHEN 975 MG: 325 TABLET, FILM COATED ORAL at 13:19

## 2022-09-19 RX ADMIN — PREDNISONE 10 MG: 10 TABLET ORAL at 08:10

## 2022-09-19 RX ADMIN — HEPARIN SODIUM 5000 UNITS: 5000 INJECTION, SOLUTION INTRAVENOUS; SUBCUTANEOUS at 08:10

## 2022-09-19 RX ADMIN — CALCIUM CARBONATE 600 MG (1,500 MG)-VITAMIN D3 400 UNIT TABLET 1 TABLET: at 08:10

## 2022-09-19 RX ADMIN — Medication 12.5 MG: at 21:02

## 2022-09-19 RX ADMIN — MYCOPHENOLATE MOFETIL 750 MG: 200 POWDER, FOR SUSPENSION ORAL at 08:07

## 2022-09-19 RX ADMIN — INSULIN ASPART 2 UNITS: 100 INJECTION, SOLUTION INTRAVENOUS; SUBCUTANEOUS at 16:37

## 2022-09-19 RX ADMIN — GABAPENTIN 200 MG: 100 CAPSULE ORAL at 08:10

## 2022-09-19 RX ADMIN — NYSTATIN 1000000 UNITS: 100000 SUSPENSION ORAL at 12:07

## 2022-09-19 RX ADMIN — Medication 12.5 MG: at 10:23

## 2022-09-19 RX ADMIN — ACETAMINOPHEN 975 MG: 325 TABLET, FILM COATED ORAL at 08:08

## 2022-09-19 RX ADMIN — PREDNISONE 7.5 MG: 2.5 TABLET ORAL at 21:02

## 2022-09-19 RX ADMIN — VALGANCICLOVIR HYDROCHLORIDE 450 MG: 50 POWDER, FOR SOLUTION ORAL at 08:07

## 2022-09-19 RX ADMIN — ACETAZOLAMIDE 125 MG: 125 TABLET ORAL at 09:21

## 2022-09-19 RX ADMIN — Medication 50 ML: at 10:46

## 2022-09-19 RX ADMIN — OXYCODONE HYDROCHLORIDE 5 MG: 5 SOLUTION ORAL at 15:10

## 2022-09-19 RX ADMIN — LOPERAMIDE HYDROCHLORIDE 2 MG: 2 CAPSULE ORAL at 00:28

## 2022-09-19 RX ADMIN — TACROLIMUS 3.5 MG: 5 CAPSULE ORAL at 08:07

## 2022-09-19 RX ADMIN — INSULIN ASPART 1 UNITS: 100 INJECTION, SOLUTION INTRAVENOUS; SUBCUTANEOUS at 10:54

## 2022-09-19 RX ADMIN — LOPERAMIDE HYDROCHLORIDE 2 MG: 2 CAPSULE ORAL at 13:45

## 2022-09-19 RX ADMIN — TRAZODONE HYDROCHLORIDE 50 MG: 50 TABLET ORAL at 21:02

## 2022-09-19 RX ADMIN — NYSTATIN 1000000 UNITS: 100000 SUSPENSION ORAL at 16:37

## 2022-09-19 RX ADMIN — NYSTATIN 1000000 UNITS: 100000 SUSPENSION ORAL at 08:09

## 2022-09-19 RX ADMIN — Medication 1 TABLET: at 08:08

## 2022-09-19 RX ADMIN — INSULIN ASPART 1 UNITS: 100 INJECTION, SOLUTION INTRAVENOUS; SUBCUTANEOUS at 03:04

## 2022-09-19 RX ADMIN — ACETAMINOPHEN 975 MG: 325 TABLET, FILM COATED ORAL at 21:02

## 2022-09-19 RX ADMIN — TACROLIMUS 4 MG: 5 CAPSULE ORAL at 18:14

## 2022-09-19 RX ADMIN — DAPSONE 50 MG: 100 TABLET ORAL at 16:37

## 2022-09-19 RX ADMIN — LOPERAMIDE HYDROCHLORIDE 2 MG: 2 CAPSULE ORAL at 05:25

## 2022-09-19 RX ADMIN — OXYCODONE HYDROCHLORIDE 5 MG: 5 SOLUTION ORAL at 10:24

## 2022-09-19 RX ADMIN — CALCIUM CARBONATE 600 MG (1,500 MG)-VITAMIN D3 400 UNIT TABLET 1 TABLET: at 18:14

## 2022-09-19 RX ADMIN — BUMETANIDE 4 MG: 0.25 INJECTION, SOLUTION INTRAMUSCULAR; INTRAVENOUS at 10:29

## 2022-09-19 RX ADMIN — Medication 40 MG: at 21:02

## 2022-09-19 ASSESSMENT — ACTIVITIES OF DAILY LIVING (ADL)
ADLS_ACUITY_SCORE: 31
ADLS_ACUITY_SCORE: 31
ADLS_ACUITY_SCORE: 32
ADLS_ACUITY_SCORE: 32
ADLS_ACUITY_SCORE: 30
ADLS_ACUITY_SCORE: 32
ADLS_ACUITY_SCORE: 31

## 2022-09-19 NOTE — PROGRESS NOTES
[facility]    Medicine Progress Note - Hospitalist Service, GOLD TEAM 10    Date of Admission:  9/9/2022    Assessment & Plan        Sofie Rodriguez is a 62 year old female with pertinent hx of end stage COPD s/p bilateral sequential lung transplant (6/22) on triple IS ( MMF/Tacro/pred), pyloric ulcer, recent ERCP c/f hemobilia, gastroparesis s/p GJ tube placement and Percutaneous J tube presents for a planned admission from transplant pulmonology to work up antibody medicated rejection versus infection.     Interval Changes:  -Continue IV diuresis, will hope to reach euvolemia prior to repeat chest imaging, will be limited by how much renal function tolerates, currently Cr resting around 1.8 with baseline Cr of 1.5 - 1.9  -Continue lytics via chest tubes  -Stop mycophenolate, start azathiopurine for possible diarrhea. If persistent diarrhea despite med change will re-check c. 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   Recent imaging - 9/8/22 CT chest - R>L pleural effusions with bilateral hazy                              nodular opacities and paraseptal thickening.   PFT - FEV1 40%, FVC - 33% FEV1/FVC 79%  Immune suppression - Tacrolimus 3.5 mg BID Goal level 8-12,  mg                                         BID ( decreased dose in the setting of GI symptoms),                                        prednisone taper currently on 10 mg BID until 9/15/22.  CMV ppx - Valgancyclovir 450 mg TID ( ending 9/28)   Oral candida ppx - Nystatin   -S/p right pigtail chest tube placement on 9/13, left thoracentesis on  9/13   Plan:  >Chest tube to drainage - left and right  >Chest tube lytics - tPa/dornase BID for 6 doses - started on 9/16  >Lidoderm patch for chest tube  >oxycodone prn  >IV diuresis  >Repeat CT chest on 9/21    #Metabolic Encephalopathy   #Tremor  -Noted on 9/19 with confusion as well as tremor. She reports symptoms coming on previous days  -No focal signs of symptoms  -DDx: Hypercarbia vs Hyperammonia vs new infection vs medication (less likely tacro)  Plan:  >Stop gabapentin  >Blood cultures, urine cultures, procal, CRP  >Ammonia, VBG - if elevated ammonia will discuss with pulmonary if requiring dialysis for hyperammonia    #Acute on Chronic HFpEF  Echo 8/15/22 - EF 60-65% with trace mitral insufficiency, admission BNP > 30K   Plan:  IV bumex  >Hold po acetazlaomide    # Diarrhea, Loose stools  # hx of C diff   Patient is on MMF with chronic loose stools at baseline. Notes reflect recent dose reduction of MMF for this.  -C diff (negtaive on 9/10)  -Switched to imuran from mycophenolate on 9/19 for diarrhea  Plan:  >Immodium prn  >If no improvement in diarrhea with med change then plan for repeat c diff testing    #Steroid induced hyperglycemia  -Lantus 5 units, sliding scale q6h    # Pyloric ulcer EGD 8/3/2022  # Acute on chronic macrocytic anemia    hx of coffee ground emesis. Patient had a EGD 8/3 which showed a Clear base ulcer in the pyloric channel identified as the culprit lesion. At the time was also noted to have excess gastric accumulation 2/2 pyloric inflammation. Vent G tube placed to allow for drainage. Vented to gravity now.  repeat EGD planned in October 2022 to check healing.  Patient's hgb hit loco 6.3 9/1 and since then in the 7s-8s. 7.1 this admission.   Plan:  - Pantoprazole 40 mg BID     - patient consented for blood transfusion, type and screen ordered.     # extra hepatic and intrahepatic biliary dilation c/f hemobilia   # Intra ductal papillary mucinous neoplasm   ERCP 8/11 showed  hemobilia.   - Monitor LFTs     #CKD stage III   Patient has been variable GFRs 30-50s in the last few months. Most recent Cr trend 1.5-1.9 1.56 9/8/22.   - CTM     #Severe gastroparesis s/p  GJ tube placement 7/27/2022  - RD nutrition consult placed for TF  - Percutaneous j tube in place with G venting to gravity     #HTN  # A flutter with RVR/SVT   . Has recently completed zio patch.  - Continue PTA metoprolol 50 mg BID          Diet: Adult Formula Drip Feeding: Continuous Nepro with Carbsteady; Jejunostomy; Goal Rate: 40 ml/hr--okay to begin at goal once new formula arrives on unit.; mL/hr; Medication - Feeding Tube Flush Frequency: At least 15-30 mL water before and after med...  Full Liquid Diet    DVT Prophylaxis: heparin  Dong Catheter: Not present  Central Lines: None  Cardiac Monitoring: None  Code Status: Full Code      Disposition Plan     Expected Discharge Date: 09/22/2022        Discharge Comments: Bilateral chest tube placement, last one placed on 9/15. Both need to be removed prior to dispo.        The patient's care was discussed with the Bedside Nurse, Patient and Pumonary transplant Team.    Chidi Fay MD  Hospitalist Service, GOLD TEAM [address] [place] [address]  Securely message with the Vocera Web Console (learn more here)  Text page via [facility] Paging/Directory   Please see signed in provider for up to date coverage information      Clinically Significant Risk Factors Present on Admission                       ______________________________________________________________________    Interval History   This morning patient noted to be tremulous. She also notes feeling confused. She had significant diarrhea overnight despite taking immodium. She denies fever or chills. No chest pain. No change in shortness of breath or breathing. She reports feeling her tremors come on for a few days. She has been taking her pain medication fairly consistently. .      Data reviewed today: I reviewed all medications, new labs and imaging results over the last 24 hours.    Physical Exam   Vital Signs: Temp: 98.9  F (37.2  C) Temp src: Oral BP: 103/55 Pulse: 75   Resp: 20 SpO2: 98 % O2 Device: Nasal cannula Oxygen Delivery: 1 LPM  Weight: 150 lbs 6.4 oz     Constitutional: awake, somnolent but awakens to voice, re-directable but easily confused  Neck: supple  Respiratory: crackles in lower posterior lung fields bilaterally , right chest tube to atrium and wall suction, left chest tube to atrium and wall suction  Cardiovascular: Regular rate and rhythm, normal S1, pronounced s2, systolic murmur 2/6  GI: Normal bowel sounds, soft, non-distended, non-tender, GJ placement  Back: no pre sacral edema  Ext: Trace lower extremity edema  Neuropsychiatric: bilateral tremors, asterixis, myoclonus bilaterally while lying in bed, directable but easliy distraced, confused    Data   Recent Results (from the past 24 hour(s))   XR Chest 2 Views    Narrative    Chest 2 views    INDICATION: Interval follow-up, lung transplant    COMPARISON: Yesterday    FINDINGS: Clamshell sternotomy from prior bilateral lung  transplantation again noted. Continued streaky pulmonary opacities  appear similar. No ectopic air collection. Costophrenic angle blunting  on the left appears similar. Left basilar chest catheter also appears  similar. Right basilar thoracostomy tube appears similar. Right apical  volume loss appears similar. Calcification at the aortic knob appears  similar. Heart size borderline enlarged.      Impression    IMPRESSION: No significant changes in patient with underlying  bilateral lung transplantation.    ALVINA HARRY MD         SYSTEM ID:  [id number]

## 2022-09-19 NOTE — PROGRESS NOTES
Pulmonary Medicine  Cystic Fibrosis - Lung Transplant Team  Progress Note  2022       Patient: Sofie Rordiguez  MRN: [id number]  : 1960 (age 62 year old)  Transplant: 2022 (Lung), POD#83  Admission date: 2022    Assessment & Plan:     Sofie Rodriguez is a 62-year-old female s/p BSLT (22) for COPD complicated by persistent bilateral pleural effusions, persistent CO2 retention, right hemidiaphragm palsy, C. diff colitis, gastroparesis s/p GJ tube placement (22), GI bleed 2/2 pyloric ulcer, hemobilia s/p ERCP and MRCP (), and persistent vasoplegia.  Other history notable for HFpEF, NTM colonoization, hepatitis C and methamphetamine use.  Patient admitted 22 with persistent dyspnea (increased with activity), daily morning hemoptysis, and increased BLE edema.  Also noted to have persistent, increased bilateral pleural effusions, diffuse bilateral groundglass changes, and more focal appearing LLL infiltrates on imaging.  Treating with aggressive diuresis with some improvement in symptoms and hypoxia.  Hemoptysis resolved, mild hypoxia and ANAYA persisting.  S/p right pigtail chest tube and aspiration of left pleural effusion () with subsequent pigtail chest tube (9/15) with lytic therapy.  Output and imaging relatively stable.      Today's recommendations:  - Following pending cultures, defer ABX  - Considering high dose steroids pending further evaluation with near euvolemia  - Bilateral chest tubes to suction and defer additional lytics  - Repeat chest CT on   - Tacro level today subtherapeutic, dose increased, repeat level  (ordered)  - MMF transitioned to AZA today given ongoing diarrhea  - Next prednisone taper due 10/13  - Dapsone resumed qMWF for PJP ppx, will monitor for worsening anemia  - VGCV for CMV ppx through   - Repeat DSA   - Repeat EGD 10/13 to check healing of ulcer, sooner if hgb declines further     S/p bilateral sequential lung transplant  (BSLT) for end stage COPD:  Acute hypoxia with chronic hypercapnia:   Pulmonary edema:  Persistent bibasilar pleural effusions:   Right hemidiaphragm palsy: Admitted with increased dyspnea with minimal exertion and small volume daily hemoptysis.  Also with marked decline in PFTs (FEV1 1.22L, 50% on 8/18 to 0.98L, 40% on 9/7).  Chest CT (9/8) with increased effusions and groundglass changes.  DSA positive but stable (as below).  BNP markedly elevated (30k) and also with increased BLE edema.  Etiology unclear and is likely multi-factorial with DDx including pulmonary edema from hypervolemia/progressive HFpEF, rejection (ACR +/- AMR), alveolar hemorrhage, and/or infection.  Aggressively diuresed with some improvement in symptoms.  Bronchoscopy (9/13) unremarkable, BAL of lingula sent, cultures no growth.  S/p right pigtail chest tube placement (9/13), transudative with moderate output initially but quickly decreasing.  S/p aspiration of left pleural effusion (9/13) and then pigtail chest tube (9/15) s/p full lytic course (916-9/19).  Still with some ANAYA but no further hemoptysis since ~9/12.  CXR (9/18) demonstrates small to resolved right effusion and ongoing small left effusion.  - BAL studies (9/13) with GPC  - Left pleural culture (9/13, 9/15) NGTD  - Right pleural studies (9/13) NGTD  - Defer ABX at this time   - Diuresis per primary  - Considering high dose steroids pending further evaluation with near euvolemia  - Supplemental O2 to keep >92%, IS and Aerobika q1h w/a  - Bilateral chest tubes to suction and defer additional lytics  - Repeat chest CT on 9/21     Immunosuppression:  - Tacrolimus 3.5 mg qAM / 4 mg qPM.  Goal level 8-12.  Level today subtherapeutic at 5.3, dose increased to 4.5 mg qAM / 4 mg qPM.  Repeat level 9/22 (ordered).  -  mg BID transitioned today to azathioprine 100 mg daily given ongoing diarrhea (discussed with primary transplant provider, Dr. Franks)  - Prednisone 10 mg qAM / 7.5 mg  qPM with next taper due 10/13 (not yet ordered)     Prophylaxis:   - Dapsone resumed qMWF for PJP ppx (Bactrim stopped d/t hyperkalemia, will need to monitor for recurrence of anemia with dapsone; Pentamidine neb not tolerated on 9/7 after only 5 minutes d/t excessive coughing)  - VGCV for CMV ppx through 9/28, D+/R+, CMV (9/13) negative     Positive DSA: Newly positive on 8/10 with DQB2 mfi 2155.  Most recent DSA on 9/14 with ~stable DQB5 from prior (5744-->5825, decreased from 7033 on 9/1).  - Repeat DSA 9/21 (ordered)     EBV viremia: Low level (1374 on 9/7), not likely to be clinically significant.  - Following monthly as OP (next due 10/7)     Other relevant problems being managed by the primary team:     Diarrhea: C diff negative on 9/10.  Likely medication related (MMF), but unable to transition to Myfortic given NPO.  Loperamide utilized with some benefit.  Goal to titrate to antidiarrheal 3 stools daily, management per primary.  Enteric stool panel negative 9/16.  Transitioned from MMF to AZA as above.     Severe gastroparesis:   Pyloric ulcer: Gastric emptying study 7/20 with severe gastric emptying delay (95% retention at 4 hours), s/p GJ tube placement in IR 7/27.  S/p EGD (8/3) noted to have pyloric ulcer and excessive gastric fluid and residual food.  S/p EGD (8/11) with mild erythema 2/2 GJ tube trauma seen near insertion site but no active bleeding.  - PPI BID  - TF continuous and ALL enteral medications via J tube  - G tube to gravity drainage; full liquid diet for comfort only  - Repeat EGD 10/13 to check healing of ulcer, sooner if hgb declines further     We appreciate the excellent care provided by the [place] [id number] team.  Recommendations communicated via in person rounding and this note.  Will continue to follow along closely, please do not hesitate to call with any questions or concerns.    Patient discussed with Dr. Paredes.    Catherine Johnson, DNP, APRN, CNP  Inpatient Nurse  Practitioner  Pulmonary CF/Transplant     Subjective & Interval History:     No recent pulmonary symptom changes, continues with ANAYA and deconditioning.  No new cough or sputum, no recent hemoptysis.  Feeling a little foggy today and noting some tremors/twitching, tacro level today subtherapeutic.  Some pain at chest tube sites.  Output small on the right and moderate on the left (lytics through this morning).    Review of Systems:     C: No fever, no chills, + decreased weight  INTEGUMENTARY/SKIN: No rash or obvious new lesions  ENT/MOUTH: No sore throat, no sinus pain, no nasal congestion or drainage  RESP: See interval history  CV: No chest pain, no palpitations, + peripheral edema, no orthopnea  GI: No nausea, no vomiting, + frequent loose stools, no reflux symptoms  : No dysuria  MUSCULOSKELETAL: No myalgias, no arthralgias  ENDOCRINE: Blood sugars intermittently elevated  NEURO: No headache  PSYCHIATRIC: Mood stable    Physical Exam:     All notes, images, and labs from past 24 hours (at minimum) were reviewed.    Vital signs:  Temp: 98.9  F (37.2  C) Temp src: Oral BP: 103/55 Pulse: 75   Resp: 20 SpO2: 98 % O2 Device: Nasal cannula Oxygen Delivery: 1 LPM   Weight: 68.2 kg (150 lb 6.4 oz)  I/O:     Intake/Output Summary (Last 24 hours) at 9/19/2022 0813  Last data filed at 9/19/2022 0700  Gross per 24 hour   Intake 1750 ml   Output 1985 ml   Net -235 ml     Constitutional: Lying in bed, daughter at bedside, in no apparent distress.   HEENT: Eyes with pink conjunctivae, anicteric.  Oral mucosa moist without lesions.  PULM: Diminished bases bilaterally.  Few scattered crackles, no rhonchi, no wheezes.  Non-labored breathing on 1L NC.  Left chest tube with some tidaling but no air leak, serosang drainage.  Right chest tube without tidaling or air leak, serous drainage.  CV: Normal S1 and S2.  RRR.  No murmur, gallop, or rub.  1+ BLE edema.   ABD: NABS, soft, nontender, nondistended.  PEG/J tube site cdi.  MSK:  Moves all extremities.  No apparent muscle wasting.   NEURO: Sleepy but easily arousable, intermittent twitching/tremors, conversant.   SKIN: Warm, dry.  No rash on limited exam.   PSYCH: Mood stable.     Lines, Drains, and Devices:  Peripheral IV 09/10/22 Anterior;Left Lower forearm (Active)   Site Assessment WDL 09/19/22 0100   Line Status Saline locked 09/19/22 0100   Dressing Intervention New dressing  09/10/22 0135   Phlebitis Scale 0-->no symptoms 09/19/22 0100   Infiltration Scale 0 09/19/22 0100   Number of days: 9     Data:     LABS    CMP:   Recent Labs   Lab 09/19/22  0546 09/19/22  0303 09/18/22  2035 09/18/22  1506 09/18/22  1041 09/18/22  0619 09/17/22  1004 09/17/22  0732 09/16/22  0839 09/16/22  0606 09/15/22  1121 09/15/22  1021 09/14/22  0814 09/14/22  0626 09/13/22  0809 09/13/22  0624     --   --   --   --  142  --  142  --  140  --  144  --  141  --  143   POTASSIUM 4.5  --   --   --   --  4.8  --  4.7  --  4.7  --  4.7   < > 4.1  --  4.1   CHLORIDE 89*  --   --   --   --  91*  --  92*  --  92*  --  94*  --  92*  --  93*   CO2 47*  --   --   --   --  43*  --  48*  --  43*  --  46*  --  45*  --  47*   ANIONGAP 4*  --   --   --   --  8  --  2*  --  5*  --  4*  --  4*  --  3*   * 173* 153* 214*   < > 166*   < > 146*   < > 160*   < > 151*   < > 219*   < > 145*   .0*  --   --   --   --  109.0*  --  101.0*  --  100.0*  --  92.6*  --  84.5*  --  74.6*   CR 1.78*  --   --   --   --  1.86*  --  1.83*  --  1.83*  --  1.84*  --  1.57*  --  1.38*   GFRESTIMATED 32*  --   --   --   --  30*  --  31*  --  31*  --  31*  --  37*  --  44*   ESTUARDO 9.3  --   --   --   --  9.4  --  9.7  --  9.5  --  9.6  --  9.1  --  9.2   MAG 2.7*  --   --   --   --   --   --   --   --  2.4*  --   --   --  2.3  --   --    PHOS 3.9  --   --   --   --   --   --   --   --  3.5  --   --   --  3.5  --   --    PROTTOTAL 5.7*  --   --   --   --   --   --   --   --  5.9*  --  6.2*  --   --   --  5.6*   ALBUMIN 3.2*  --    --   --   --   --   --   --   --  3.1*  --   --   --   --   --   --    BILITOTAL <0.2  --   --   --   --   --   --   --   --  0.2  --   --   --   --   --   --    ALKPHOS 90  --   --   --   --   --   --   --   --  85  --   --   --   --   --   --    AST 13  --   --   --   --   --   --   --   --  21  --   --   --   --   --   --    ALT 6*  --   --   --   --   --   --   --   --  <5*  --   --   --   --   --   --     < > = values in this interval not displayed.     CBC:   Recent Labs   Lab 09/19/22  0546 09/18/22  0619 09/17/22  0732 09/16/22  0606   WBC 7.0 6.9 7.4 6.9   RBC 2.56* 2.54* 2.79* 2.69*   HGB 7.8* 7.9* 8.5* 8.1*   HCT 27.5* 27.1* 30.0* 29.1*   * 107* 108* 108*   MCH 30.5 31.1 30.5 30.1   MCHC 28.4* 29.2* 28.3* 27.8*   RDW 19.2* 19.3* 19.3* 19.8*    217 236 224       INR:   Recent Labs   Lab 09/15/22  1317   INR 0.94       Glucose:   Recent Labs   Lab 09/19/22  0546 09/19/22  0303 09/18/22  2035 09/18/22  1506 09/18/22  1041 09/18/22  0619   * 173* 153* 214* 181* 166*       Blood Gas:   Recent Labs   Lab 09/14/22  0626 09/13/22  0624 09/12/22  0851   PHV 7.38 7.36 7.36   PCO2V 77* 82* 81*   PO2V 58* 109* 45   HCO3V 45* 47* 46*   LINDY 18.7* 17.9* 15.5*   O2PER 20 30 2       Culture Data No results for input(s): CULT in the last 168 hours.    Virology Data:   Lab Results   Component Value Date    FLUAH1 Not Detected 09/13/2022    FLUAH3 Not Detected 09/13/2022    ME2256 Not Detected 09/13/2022    IFLUB Not Detected 09/13/2022    RSVA Not Detected 09/13/2022    RSVB Not Detected 09/13/2022    PIV1 Not Detected 09/13/2022    PIV2 Not Detected 09/13/2022    PIV3 Not Detected 09/13/2022    HMPV Not Detected 09/13/2022       Historical CMV results (last 3 of prior testing):  Lab Results   Component Value Date    CMVQNT Not Detected 09/13/2022    CMVQNT Not Detected 09/07/2022    CMVQNT Not Detected 09/01/2022     No results found for: CMVLOG    Urine Studies    Recent Labs   Lab Test 08/01/22  0319  07/08/22  0831 07/05/22  1004   URINEPH 8.0* 5.5 5.5   NITRITE Negative Negative Negative   LEUKEST Negative Negative Negative   WBCU  --  1 5       Most Recent [facility] Pulmonary Function Testing (FVC/FEV1 only)  FVC-Pre   Date Value Ref Range Status   09/07/2022 1.01 L    09/01/2022 1.07 L    08/24/2022 1.23 L    08/18/2022 1.33 L      FVC-%Pred-Pre   Date Value Ref Range Status   09/07/2022 33 %    09/01/2022 35 %    08/24/2022 40 %    08/18/2022 43 %      FEV1-Pre   Date Value Ref Range Status   09/07/2022 0.98 L    09/01/2022 1.02 L    08/24/2022 1.17 L    08/18/2022 1.22 L      FEV1-%Pred-Pre   Date Value Ref Range Status   09/07/2022 40 %    09/01/2022 42 %    08/24/2022 48 %    08/18/2022 50 %        IMAGING    Recent Results (from the past 48 hour(s))   XR Chest 2 Views    Narrative     Examination:  XR CHEST 2 VIEWS     Date:  9/17/2022 1:12 PM      Clinical Information: interval follow up, lung transplant, right  chest tube     Additional Information: none    Comparison: 9/16/2022    Findings:   Clamshell sternotomy, bilateral lung transplant. Bilateral chest  tubes. No focal opacities in the lungs. Small biapical pleural  effusions. Fluid in the minor fissure. Blunted left costophrenic  angle. Interstitial prominence. Cardiac silhouette slightly enlarged.  No acute osseous injury.      Impression    Impression:  1. No significant change bilateral lung transplant with bibasilar  chest tubes. Biapical loculated pleural effusions persist. The left  basal lateral perfusion not significantly changed.     MONSERRAT SEGAL MD         SYSTEM ID:  [id number]   XR Chest 2 Views    Narrative    XR CHEST 2 VIEWS  9/18/2022 8:47 AM      HISTORY: interval follow up, lung transplant, right chest tube    COMPARISON: 9/17/2022    FINDINGS:   PA and lateral views of the chest. Stable postoperative changes.  Stable bilateral chest tubes. Stable mediastinum. No pneumothorax.  Largely unchanged partially loculated bilateral  pleural effusions.  Stable multifocal hazy and streaky opacities.      Impression    IMPRESSION:   1. Stable partially loculated bilateral pleural effusions with chest  tubes in similar position.  2. Largely unchanged multifocal atelectasis and probable mild  pulmonary edema.    I have personally reviewed the examination and initial interpretation  and I agree with the findings.    KANDACE ROJAS MD         SYSTEM ID:  [id number]

## 2022-09-19 NOTE — PROVIDER NOTIFICATION
Lab notified writer of critical lab result CO2 = 47. Provider Beto Noble notified @ 0630 via [email or web address] paging system.

## 2022-09-19 NOTE — PLAN OF CARE
Diagnosis: BSLT 6/28 c/b by pleural effusions. Admitted 9/9 w/ increased dyspnea, morning hemoptysis, increased BLE edema.      Vitals: /55 (BP Location: Right arm, Cuff Size: Adult Regular)   Pulse 75   Temp 98.9  F (37.2  C) (Oral)   Resp 20   Wt 68.2 kg (150 lb 6.4 oz)   SpO2 98%   BMI 26.64 kg/m        Neuro: A/o x4, denies headache. Baseline shaky.   Resp: 1L NC, sating high 90's. ANAYA.   Cardiac: sinus rhythm, pressures stable.   GI/: GJ tube w/ TF running 40 mL/hr at goal. Tube feeds and medications through J, G to gravity. Watery/loose BM x6 overnight. PRN Imodium given x3.   Skin: compression stockings stayed on overnight.   LDAs: 2 CT to 2 atriums -20 mmH2O. GJ tube. LPIV saline locked.   Mobility: x1 w/ walker.      Goals/Plan: continue plan of care, notify team of any significant changes

## 2022-09-19 NOTE — PROGRESS NOTES
Pt placed on BIPAP for increase work of breathe/alter mental status.  Current settings are S/T, 10/5, RR 12, FiO2 30%.

## 2022-09-20 ENCOUNTER — APPOINTMENT (OUTPATIENT)
Dept: CT IMAGING | Facility: CLINIC | Age: 62
DRG: 205 | End: 2022-09-20
Attending: INTERNAL MEDICINE
Payer: MEDICARE

## 2022-09-20 ENCOUNTER — APPOINTMENT (OUTPATIENT)
Dept: CARDIOLOGY | Facility: CLINIC | Age: 62
DRG: 205 | End: 2022-09-20
Attending: INTERNAL MEDICINE
Payer: MEDICARE

## 2022-09-20 ENCOUNTER — APPOINTMENT (OUTPATIENT)
Dept: GENERAL RADIOLOGY | Facility: CLINIC | Age: 62
DRG: 205 | End: 2022-09-20
Attending: PHYSICIAN ASSISTANT
Payer: MEDICARE

## 2022-09-20 LAB
ALBUMIN SERPL BCG-MCNC: 3.1 G/DL (ref 3.5–5.2)
ALP SERPL-CCNC: 75 U/L (ref 35–104)
ALT SERPL W P-5'-P-CCNC: 9 U/L (ref 10–35)
ANION GAP SERPL CALCULATED.3IONS-SCNC: 8 MMOL/L (ref 7–15)
AST SERPL W P-5'-P-CCNC: 15 U/L (ref 10–35)
BASE EXCESS BLDA CALC-SCNC: 22.3 MMOL/L (ref -9–1.8)
BASE EXCESS BLDV CALC-SCNC: 21.1 MMOL/L (ref -7.7–1.9)
BASE EXCESS BLDV CALC-SCNC: 23.2 MMOL/L (ref -7.7–1.9)
BASE EXCESS BLDV CALC-SCNC: 23.4 MMOL/L (ref -7.7–1.9)
BILIRUB DIRECT SERPL-MCNC: <0.2 MG/DL (ref 0–0.3)
BILIRUB SERPL-MCNC: <0.2 MG/DL
BUN SERPL-MCNC: 112 MG/DL (ref 8–23)
CALCIUM SERPL-MCNC: 9 MG/DL (ref 8.8–10.2)
CHLORIDE SERPL-SCNC: 87 MMOL/L (ref 98–107)
CREAT SERPL-MCNC: 1.78 MG/DL (ref 0.51–0.95)
CRP SERPL-MCNC: 4.92 MG/L
DEPRECATED HCO3 PLAS-SCNC: 46 MMOL/L (ref 22–29)
ERYTHROCYTE [DISTWIDTH] IN BLOOD BY AUTOMATED COUNT: 18.9 % (ref 10–15)
GFR SERPL CREATININE-BSD FRML MDRD: 32 ML/MIN/1.73M2
GLUCOSE BLDC GLUCOMTR-MCNC: 136 MG/DL (ref 70–99)
GLUCOSE BLDC GLUCOMTR-MCNC: 147 MG/DL (ref 70–99)
GLUCOSE BLDC GLUCOMTR-MCNC: 177 MG/DL (ref 70–99)
GLUCOSE BLDC GLUCOMTR-MCNC: 178 MG/DL (ref 70–99)
GLUCOSE SERPL-MCNC: 163 MG/DL (ref 70–99)
HCO3 BLD-SCNC: 50 MMOL/L (ref 21–28)
HCO3 BLDV-SCNC: 50 MMOL/L (ref 21–28)
HCO3 BLDV-SCNC: 52 MMOL/L (ref 21–28)
HCO3 BLDV-SCNC: 52 MMOL/L (ref 21–28)
HCT VFR BLD AUTO: 25.3 % (ref 35–47)
HGB BLD-MCNC: 7.3 G/DL (ref 11.7–15.7)
HOLD SPECIMEN: NORMAL
LACTATE SERPL-SCNC: 1.4 MMOL/L (ref 0.7–2)
LVEF ECHO: NORMAL
MAGNESIUM SERPL-MCNC: 2.8 MG/DL (ref 1.7–2.3)
MCH RBC QN AUTO: 30.9 PG (ref 26.5–33)
MCHC RBC AUTO-ENTMCNC: 28.9 G/DL (ref 31.5–36.5)
MCV RBC AUTO: 107 FL (ref 78–100)
O2/TOTAL GAS SETTING VFR VENT: 30 %
O2/TOTAL GAS SETTING VFR VENT: 35 %
O2/TOTAL GAS SETTING VFR VENT: 35 %
O2/TOTAL GAS SETTING VFR VENT: 99 %
OXYHGB MFR BLD: 97 % (ref 92–100)
PCO2 BLD: 82 MM HG (ref 35–45)
PCO2 BLDV: 87 MM HG (ref 40–50)
PCO2 BLDV: 93 MM HG (ref 40–50)
PCO2 BLDV: 93 MM HG (ref 40–50)
PH BLD: 7.39 [PH] (ref 7.35–7.45)
PH BLDV: 7.36 [PH] (ref 7.32–7.43)
PH BLDV: 7.36 [PH] (ref 7.32–7.43)
PH BLDV: 7.37 [PH] (ref 7.32–7.43)
PHOSPHATE SERPL-MCNC: 4 MG/DL (ref 2.5–4.5)
PLATELET # BLD AUTO: 186 10E3/UL (ref 150–450)
PO2 BLD: 147 MM HG (ref 80–105)
PO2 BLDV: 28 MM HG (ref 25–47)
PO2 BLDV: 29 MM HG (ref 25–47)
PO2 BLDV: 72 MM HG (ref 25–47)
POTASSIUM SERPL-SCNC: 4.3 MMOL/L (ref 3.4–5.3)
PROCALCITONIN SERPL IA-MCNC: 0.41 NG/ML
PROT SERPL-MCNC: 5.2 G/DL (ref 6.4–8.3)
RBC # BLD AUTO: 2.36 10E6/UL (ref 3.8–5.2)
SODIUM SERPL-SCNC: 141 MMOL/L (ref 136–145)
WBC # BLD AUTO: 5.9 10E3/UL (ref 4–11)

## 2022-09-20 PROCEDURE — 83605 ASSAY OF LACTIC ACID: CPT | Performed by: INTERNAL MEDICINE

## 2022-09-20 PROCEDURE — 36600 WITHDRAWAL OF ARTERIAL BLOOD: CPT

## 2022-09-20 PROCEDURE — 93308 TTE F-UP OR LMTD: CPT | Mod: 26 | Performed by: STUDENT IN AN ORGANIZED HEALTH CARE EDUCATION/TRAINING PROGRAM

## 2022-09-20 PROCEDURE — 82248 BILIRUBIN DIRECT: CPT | Performed by: STUDENT IN AN ORGANIZED HEALTH CARE EDUCATION/TRAINING PROGRAM

## 2022-09-20 PROCEDURE — 71045 X-RAY EXAM CHEST 1 VIEW: CPT | Mod: 26 | Performed by: RADIOLOGY

## 2022-09-20 PROCEDURE — 999N000157 HC STATISTIC RCP TIME EA 10 MIN

## 2022-09-20 PROCEDURE — 99233 SBSQ HOSP IP/OBS HIGH 50: CPT | Mod: 24 | Performed by: PHYSICIAN ASSISTANT

## 2022-09-20 PROCEDURE — 84145 PROCALCITONIN (PCT): CPT | Performed by: INTERNAL MEDICINE

## 2022-09-20 PROCEDURE — 82803 BLOOD GASES ANY COMBINATION: CPT | Performed by: STUDENT IN AN ORGANIZED HEALTH CARE EDUCATION/TRAINING PROGRAM

## 2022-09-20 PROCEDURE — 93325 DOPPLER ECHO COLOR FLOW MAPG: CPT

## 2022-09-20 PROCEDURE — 99233 SBSQ HOSP IP/OBS HIGH 50: CPT | Performed by: INTERNAL MEDICINE

## 2022-09-20 PROCEDURE — 250N000011 HC RX IP 250 OP 636: Performed by: INTERNAL MEDICINE

## 2022-09-20 PROCEDURE — 87493 C DIFF AMPLIFIED PROBE: CPT | Performed by: INTERNAL MEDICINE

## 2022-09-20 PROCEDURE — 36415 COLL VENOUS BLD VENIPUNCTURE: CPT | Performed by: STUDENT IN AN ORGANIZED HEALTH CARE EDUCATION/TRAINING PROGRAM

## 2022-09-20 PROCEDURE — 83735 ASSAY OF MAGNESIUM: CPT | Performed by: STUDENT IN AN ORGANIZED HEALTH CARE EDUCATION/TRAINING PROGRAM

## 2022-09-20 PROCEDURE — 250N000012 HC RX MED GY IP 250 OP 636 PS 637: Performed by: NURSE PRACTITIONER

## 2022-09-20 PROCEDURE — 250N000013 HC RX MED GY IP 250 OP 250 PS 637

## 2022-09-20 PROCEDURE — 82803 BLOOD GASES ANY COMBINATION: CPT | Performed by: PHYSICIAN ASSISTANT

## 2022-09-20 PROCEDURE — 93325 DOPPLER ECHO COLOR FLOW MAPG: CPT | Mod: 26 | Performed by: STUDENT IN AN ORGANIZED HEALTH CARE EDUCATION/TRAINING PROGRAM

## 2022-09-20 PROCEDURE — 250N000012 HC RX MED GY IP 250 OP 636 PS 637: Performed by: PHYSICIAN ASSISTANT

## 2022-09-20 PROCEDURE — 36415 COLL VENOUS BLD VENIPUNCTURE: CPT | Performed by: PHYSICIAN ASSISTANT

## 2022-09-20 PROCEDURE — G1010 CDSM STANSON: HCPCS

## 2022-09-20 PROCEDURE — 250N000011 HC RX IP 250 OP 636: Performed by: STUDENT IN AN ORGANIZED HEALTH CARE EDUCATION/TRAINING PROGRAM

## 2022-09-20 PROCEDURE — 250N000013 HC RX MED GY IP 250 OP 250 PS 637: Performed by: PEDIATRICS

## 2022-09-20 PROCEDURE — 93308 TTE F-UP OR LMTD: CPT

## 2022-09-20 PROCEDURE — 214N000001 HC R&B CCU UMMC

## 2022-09-20 PROCEDURE — 94660 CPAP INITIATION&MGMT: CPT

## 2022-09-20 PROCEDURE — 85014 HEMATOCRIT: CPT | Performed by: STUDENT IN AN ORGANIZED HEALTH CARE EDUCATION/TRAINING PROGRAM

## 2022-09-20 PROCEDURE — 82610 CYSTATIN C: CPT | Performed by: INTERNAL MEDICINE

## 2022-09-20 PROCEDURE — 86140 C-REACTIVE PROTEIN: CPT | Performed by: STUDENT IN AN ORGANIZED HEALTH CARE EDUCATION/TRAINING PROGRAM

## 2022-09-20 PROCEDURE — 80053 COMPREHEN METABOLIC PANEL: CPT | Performed by: STUDENT IN AN ORGANIZED HEALTH CARE EDUCATION/TRAINING PROGRAM

## 2022-09-20 PROCEDURE — 93321 DOPPLER ECHO F-UP/LMTD STD: CPT | Mod: 26 | Performed by: STUDENT IN AN ORGANIZED HEALTH CARE EDUCATION/TRAINING PROGRAM

## 2022-09-20 PROCEDURE — G1010 CDSM STANSON: HCPCS | Mod: GC | Performed by: RADIOLOGY

## 2022-09-20 PROCEDURE — 250N000013 HC RX MED GY IP 250 OP 250 PS 637: Performed by: STUDENT IN AN ORGANIZED HEALTH CARE EDUCATION/TRAINING PROGRAM

## 2022-09-20 PROCEDURE — 70450 CT HEAD/BRAIN W/O DYE: CPT | Mod: 26 | Performed by: RADIOLOGY

## 2022-09-20 PROCEDURE — 250N000013 HC RX MED GY IP 250 OP 250 PS 637: Performed by: NURSE PRACTITIONER

## 2022-09-20 PROCEDURE — 71045 X-RAY EXAM CHEST 1 VIEW: CPT

## 2022-09-20 PROCEDURE — 84100 ASSAY OF PHOSPHORUS: CPT | Performed by: STUDENT IN AN ORGANIZED HEALTH CARE EDUCATION/TRAINING PROGRAM

## 2022-09-20 RX ORDER — CEFTRIAXONE 2 G/1
2 INJECTION, POWDER, FOR SOLUTION INTRAMUSCULAR; INTRAVENOUS EVERY 24 HOURS
Status: COMPLETED | OUTPATIENT
Start: 2022-09-20 | End: 2022-09-24

## 2022-09-20 RX ADMIN — INSULIN GLARGINE 6 UNITS: 100 INJECTION, SOLUTION SUBCUTANEOUS at 08:20

## 2022-09-20 RX ADMIN — TACROLIMUS 4.5 MG: 5 CAPSULE ORAL at 08:19

## 2022-09-20 RX ADMIN — PREDNISONE 7.5 MG: 2.5 TABLET ORAL at 20:14

## 2022-09-20 RX ADMIN — INSULIN ASPART 1 UNITS: 100 INJECTION, SOLUTION INTRAVENOUS; SUBCUTANEOUS at 10:17

## 2022-09-20 RX ADMIN — TACROLIMUS 4 MG: 5 CAPSULE ORAL at 18:32

## 2022-09-20 RX ADMIN — Medication 12.5 MG: at 08:19

## 2022-09-20 RX ADMIN — Medication 40 MG: at 20:53

## 2022-09-20 RX ADMIN — NYSTATIN 1000000 UNITS: 100000 SUSPENSION ORAL at 20:14

## 2022-09-20 RX ADMIN — HEPARIN SODIUM 5000 UNITS: 5000 INJECTION, SOLUTION INTRAVENOUS; SUBCUTANEOUS at 20:14

## 2022-09-20 RX ADMIN — CYANOCOBALAMIN TAB 500 MCG 500 MCG: 500 TAB at 08:20

## 2022-09-20 RX ADMIN — CALCIUM CARBONATE 600 MG (1,500 MG)-VITAMIN D3 400 UNIT TABLET 1 TABLET: at 18:32

## 2022-09-20 RX ADMIN — ACETAMINOPHEN 975 MG: 325 TABLET, FILM COATED ORAL at 13:53

## 2022-09-20 RX ADMIN — ACETAMINOPHEN 975 MG: 325 TABLET, FILM COATED ORAL at 20:14

## 2022-09-20 RX ADMIN — Medication 1 TABLET: at 08:19

## 2022-09-20 RX ADMIN — PREDNISONE 10 MG: 10 TABLET ORAL at 08:20

## 2022-09-20 RX ADMIN — HEPARIN SODIUM 5000 UNITS: 5000 INJECTION, SOLUTION INTRAVENOUS; SUBCUTANEOUS at 08:32

## 2022-09-20 RX ADMIN — MENTHOL 1 PATCH: 205.5 PATCH TOPICAL at 08:19

## 2022-09-20 RX ADMIN — ACETAMINOPHEN 975 MG: 325 TABLET, FILM COATED ORAL at 08:19

## 2022-09-20 RX ADMIN — Medication 40 MG: at 08:18

## 2022-09-20 RX ADMIN — NYSTATIN 1000000 UNITS: 100000 SUSPENSION ORAL at 11:55

## 2022-09-20 RX ADMIN — Medication 12.5 MG: at 20:15

## 2022-09-20 RX ADMIN — OXYCODONE HYDROCHLORIDE 5 MG: 5 SOLUTION ORAL at 04:11

## 2022-09-20 RX ADMIN — Medication 100 MG: at 08:19

## 2022-09-20 RX ADMIN — NYSTATIN 1000000 UNITS: 100000 SUSPENSION ORAL at 08:20

## 2022-09-20 RX ADMIN — FOLIC ACID 1 MG: 1 TABLET ORAL at 08:19

## 2022-09-20 RX ADMIN — LOPERAMIDE HYDROCHLORIDE 2 MG: 2 CAPSULE ORAL at 09:11

## 2022-09-20 RX ADMIN — INSULIN ASPART 1 UNITS: 100 INJECTION, SOLUTION INTRAVENOUS; SUBCUTANEOUS at 15:58

## 2022-09-20 RX ADMIN — CALCIUM CARBONATE 600 MG (1,500 MG)-VITAMIN D3 400 UNIT TABLET 1 TABLET: at 08:20

## 2022-09-20 RX ADMIN — INSULIN ASPART 1 UNITS: 100 INJECTION, SOLUTION INTRAVENOUS; SUBCUTANEOUS at 04:02

## 2022-09-20 RX ADMIN — CEFTRIAXONE SODIUM 2 G: 2 INJECTION, POWDER, FOR SOLUTION INTRAMUSCULAR; INTRAVENOUS at 12:37

## 2022-09-20 ASSESSMENT — ACTIVITIES OF DAILY LIVING (ADL)
ADLS_ACUITY_SCORE: 32
ADLS_ACUITY_SCORE: 30
ADLS_ACUITY_SCORE: 32

## 2022-09-20 NOTE — PROGRESS NOTES
Pulmonary Medicine  Cystic Fibrosis - Lung Transplant Team  Progress Note  2022       Patient: Sofie Rodriguez  MRN: [id number]  : 1960 (age 62 year old)  Transplant: 2022 (Lung), POD#84  Admission date: 2022    Assessment & Plan:     Sofie Rodriguez is a 62-year-old female s/p BSLT (22) for COPD complicated by persistent bilateral pleural effusions, persistent CO2 retention, right hemidiaphragm palsy, BACILIO (dialysis -), C. diff colitis, gastroparesis s/p GJ tube placement (22), GI bleed 2/2 pyloric ulcer, hemobilia s/p ERCP and MRCP (), and persistent vasoplegia.  Other history notable for HFpEF, NTM colonoization, hepatitis C, and methamphetamine use.  Patient admitted 22 with persistent dyspnea (increased with activity), daily morning hemoptysis, and increased BLE edema.  Also noted to have persistent, increased bilateral pleural effusions, diffuse bilateral groundglass changes, and more focal appearing LLL infiltrates on imaging.  Treating with aggressive diuresis with some improvement in symptoms and hypoxia.  Hemoptysis resolved, mild hypoxia and ANAYA persisting.  S/p right pigtail chest tube and aspiration of left pleural effusion () with subsequent pigtail chest tube (9/15) with lytic therapy.  Output and imaging relatively stable.  Worsening mentation and tremors noted .  Also, with worsening hypercapnia, placed on BiPAP overnight.     Today's recommendations:  - Following pending cultures and additional infectious w/u, sputum culture ordered, UTI ABX per primary team  - Considering high dose steroids pending further evaluation with near euvolemia, defer for now  - Continuous BiPAP for now (okay for 1h break with NC prn, discussed with RN and RT), repeat VBG ordered at 1600 and tomorrow morning  - Bilateral chest tubes to suction, defer additional lytics  - CXR daily as below  - Repeat chest CT tomorrow  - Tacrolimus level ordered   - AZA started  today  - Prednisone taper due 10/13 (not yet ordered)  - Monitor for worsening anemia with resumption of dapsone  - VGCV for CMV ppx through 9/28  - Repeat DSA ordered tomorrow  - Repeat EBV 10/7 (not yet ordered)  - Head CT, agree with nephrology consult  - Limit PO intake given need for BiPAP  - Repeat EGD 10/13 to check healing of ulcer, sooner if hgb declines further     S/p bilateral sequential lung transplant (BSLT) for end stage COPD:  Acute hypoxia with chronic hypercapnia:   Pulmonary edema:  Persistent bibasilar pleural effusions:   Right hemidiaphragm palsy: Admitted with increased dyspnea with minimal exertion and small volume daily hemoptysis.  Also with marked decline in PFTs (FEV1 1.22L, 50% on 8/18 to 0.98L, 40% on 9/7).  Chest CT (9/8) with increased effusions and groundglass changes.  DSA positive but stable (as below).  BNP markedly elevated (30k) and also with increased BLE edema.  Etiology unclear and is likely multi-factorial with DDx including pulmonary edema from hypervolemia/progressive HFpEF, rejection (ACR +/- AMR), alveolar hemorrhage, and/or infection.  Aggressively diuresed with some improvement in symptoms.  Bronchoscopy (9/13) unremarkable, BAL of lingula sent, cultures no growth (GPC on gram stain only).  S/p right pigtail chest tube placement (9/13), transudative with moderate output initially but quickly decreasing.  S/p aspiration of left pleural effusion (9/13) and then pigtail chest tube (9/15) s/p full lytic course (916-9/19).  Still with some ANAYA but no further hemoptysis since ~9/12.  CXR (9/18) demonstrates small to resolved right effusion and ongoing small left effusion.  Placed on BiPAP 10/5 with FiO2 30-35% last night due to worsening hypercapnia.    - Sputum culture (ordered)  - Left pleural culture (9/13, 9/15) NGTD  - Right pleural studies (9/13) NGTD  - Defer pulmonary ABX at this time  - Volume management per primary team (nephrology consult as below)  - Considering  high dose steroids pending further evaluation with near euvolemia, defer for now  - Supplemental O2 to keep >92%, IS and Aerobika q1h w/a, continuous BiPAP for now (okay for 1h break with NC prn, discussed with RN and RT), repeat VBG at 1600 9/20 and AM 9/21 (ordered)  - Bilateral chest tubes to suction, defer additional lytics   - CXR daily, awaiting today  - Repeat chest CT on 9/21  - Consider metabolic cart study pending clinical course     Immunosuppression:  - Tacrolimus 4.5 mg qAM / 4 mg qPM (increased 9/19).  Goal level 8-12.  Repeat level 9/22 (ordered).  - Azathioprine 100 mg daily (9/20, MMF stopped due to ongoing diarrhea)  - Prednisone 10 mg qAM / 7.5 mg qPM with next taper due 10/13 (not yet ordered)  Date AM dose (mg) PM dose (mg)   9/15/22 10 7.5   10/13/22 7.5 7.5   11/10/22 7.5 5   12/8/22 5 5   1/5/23 5 2.5     Prophylaxis:   - Dapsone qMWF for PJP ppx (resumed 9/19 monitor for worsening anemia, Bactrim stopped d/t hyperkalemia, will need to monitor for recurrence of anemia with dapsone; Pentamidine neb not tolerated on 9/7 after only 5 minutes d/t excessive coughing)  - VGCV for CMV ppx through 9/28, D+/R+, CMV (9/13) negative     Positive DSA: Newly positive on 8/10 with DQB2 mfi 2155.  Most recent DSA on 9/14 with ~stable DQB5 from prior (5744-->5825, decreased from 7033 on 9/1).  - Repeat DSA 9/21 (ordered)     EBV viremia: Low level (1374 on 9/7), not likely to be clinically significant.  - Following EBV monthly as OP (next due 10/7, not yet ordered)     Other relevant problems being managed by the primary team:    Encephalopathy:  Tremors: Noted 9/19 with confusion as well as tremor.  DDx including hypercapnia, hyperammonia, infection, uremia, medication related.  VBG with worsening hypercapnia (99).  BUN elevated (107).  Ammonia normal (24).  UA with moderate blood, large leukocyte esterase, 3 RBC, 29 WBC, and many bacteria.  CRP normal, procal 0.1.  Tacrolimus levels recently  "subtherapeutic.    - Blood culture (9/19) NGTD  - Urine culture (9/19) pending, follow  - IV ceftriaxone per primary team given concern for UTI  - Head CT  - Agree with nephrology consult     Diarrhea: C diff negative on 9/10.  Likely medication related (MMF), but unable to transition to Myfortic given NPO.  Loperamide utilized with some benefit.  Goal to titrate to antidiarrheal 3 stools daily, management per primary.  Enteric stool panel negative 9/16.  Transitioned from MMF to AZA as above.  - Repeat C diff     Severe gastroparesis:   Pyloric ulcer: Gastric emptying study 7/20 with severe gastric emptying delay (95% retention at 4 hours), s/p GJ tube placement in IR 7/27.  S/p EGD (8/3) noted to have pyloric ulcer and excessive gastric fluid and residual food.  S/p EGD (8/11) with mild erythema 2/2 GJ tube trauma seen near insertion site but no active bleeding.  - PPI BID  - TF continuous and ALL enteral medications via J tube  - G tube to gravity drainage; full liquid diet for comfort only (limit given need for BiPAP)  - Repeat EGD 10/13 to check healing of ulcer, sooner if hgb declines further    We appreciate the excellent care provided by the [place] [id number] team.  Recommendations communicated via in person rounding and this note.  Will continue to follow along closely, please do not hesitate to call with any questions or concerns.    Patient discussed with Dr. Paredes.    Yuliet Aviles PA-C  [facility]patient [facility]  Pulmonary CF/Transplant     Subjective & Interval History:     Worsening mentation and tremors last night, VBG with worsening hypercapnia, placed on BiPAP 10/5 with FiO2 30-35% overnight.  Pt. reports slightly improvement in mentation this morning although still feeling \"off\" and persistent tremors, inability to text on phone.  VBG with ongoing hypercapnia, slightly HA this morning.  Breathing feels unchanged, on 1L NC when not on BiPAP.  Denies change in minimal cough, no sputum.  Pain at chest tube " site, left with moderate output and right with minimal output.      Review of Systems:     C: No fever, no chills, + overall decreased weight  INTEGUMENTARY/SKIN: No rash or obvious new lesions  ENT/MOUTH: No sore throat, no sinus pain, no nasal congestion or drainage  RESP: See interval history  CV: No chest pain, no palpitations, + peripheral edema  GI: No nausea, no vomiting, + frequent loose stools, no reflux symptoms  : No dysuria  MUSCULOSKELETAL: No myalgias, no arthralgias  ENDOCRINE: Blood sugars with adequate control  NEURO: No numbness or tingling  PSYCHIATRIC: Mood stable    Physical Exam:     All notes, images, and labs from past 24 hours (at minimum) were reviewed.    Vital signs:  Temp: 98.3  F (36.8  C) Temp src: Axillary BP: 124/59 Pulse: 76   Resp: 15 SpO2: 100 % O2 Device: BiPAP/CPAP Oxygen Delivery: 1 LPM   Weight: 68.3 kg (150 lb 9.6 oz)  I/O:     Intake/Output Summary (Last 24 hours) at 9/20/2022 0858  Last data filed at 9/20/2022 0700  Gross per 24 hour   Intake 880 ml   Output 2390 ml   Net -1510 ml     Constitutional: Lying in bed, in no apparent distress.   HEENT: Eyes with pink conjunctivae, anicteric.  Oral mucosa moist without lesions.    PULM: Diminished air flow to bases bilaterally.  Few scattered crackles.  No rhonchi, no wheezes.  Non-labored breathing on 1L NC.  Bilateral chest tubes without tidaling or air leak.  CV: Normal S1 and S2.  RRR.  + systolic murmur.  No gallop or rub.  1+ BLE edema.   ABD: NABS, soft, nontender, nondistended.  PEG/J tube not visualized.  MSK: Moves all extremities.  NEURO: Sleepy but easily arousable, oriented to person, place, time, and situation, answering questions appropriately although delayed response/requiring repeat questions at times, intermittent twitching/tremors   SKIN: Warm, dry.  No rash on limited exam.   PSYCH: Mood stable.     Lines, Drains, and Devices:  Peripheral IV 09/10/22 Anterior;Left Lower forearm (Active)   Site Assessment  [facility] 09/20/22 0100   Line Status Saline locked 09/20/22 0100   Dressing Intervention New dressing  09/10/22 0135   Phlebitis Scale 0-->no symptoms 09/20/22 0100   Infiltration Scale 0 09/20/22 0100   Number of days: 10     Data:     LABS    CMP:   Recent Labs   Lab 09/20/22  0701 09/20/22  0400 09/19/22  2112 09/19/22  1609 09/19/22  1051 09/19/22  0817 09/19/22  0546 09/18/22  1041 09/18/22  0619 09/17/22  1004 09/17/22  0732 09/16/22  0839 09/16/22  0606 09/15/22  1121 09/15/22  1021 09/14/22  0814 09/14/22  0626   NA  --   --   --   --   --   --  140  --  142  --  142  --  140  --  144  --  141   POTASSIUM  --   --   --   --   --   --  4.5  --  4.8  --  4.7  --  4.7  --  4.7   < > 4.1   CHLORIDE  --   --   --   --   --   --  89*  --  91*  --  92*  --  92*  --  94*  --  92*   CO2  --   --   --   --   --   --  47*  --  43*  --  48*  --  43*  --  46*  --  45*   ANIONGAP  --   --   --   --   --   --  4*  --  8  --  2*  --  5*  --  4*  --  4*   GLC  --  178* 125* 190* 185*   < > 176*   < > 166*   < > 146*   < > 160*   < > 151*   < > 219*   BUN  --   --   --   --   --   --  107.0*  --  109.0*  --  101.0*  --  100.0*  --  92.6*  --  84.5*   CR  --   --   --   --   --   --  1.78*  --  1.86*  --  1.83*  --  1.83*  --  1.84*  --  1.57*   GFRESTIMATED  --   --   --   --   --   --  32*  --  30*  --  31*  --  31*  --  31*  --  37*   ESTUARDO  --   --   --   --   --   --  9.3  --  9.4  --  9.7  --  9.5  --  9.6  --  9.1   MAG 2.8*  --   --   --   --   --  2.7*  --   --   --   --   --  2.4*  --   --   --  2.3   PHOS 4.0  --   --   --   --   --  3.9  --   --   --   --   --  3.5  --   --   --  3.5   PROTTOTAL 5.2*  --   --   --   --   --  5.7*  --   --   --   --   --  5.9*  --  6.2*  --   --    ALBUMIN 3.1*  --   --   --   --   --  3.2*  --   --   --   --   --  3.1*  --   --   --   --    BILITOTAL <0.2  --   --   --   --   --  <0.2  --   --   --   --   --  0.2  --   --   --   --    ALKPHOS 75  --   --   --   --   --  90  --   --   --    --   --  85  --   --   --   --    AST 15  --   --   --   --   --  13  --   --   --   --   --  21  --   --   --   --    ALT 9*  --   --   --   --   --  6*  --   --   --   --   --  <5*  --   --   --   --     < > = values in this interval not displayed.     CBC:   Recent Labs   Lab 09/20/22  0701 09/19/22  0546 09/18/22  0619 09/17/22  0732   WBC 5.9 7.0 6.9 7.4   RBC 2.36* 2.56* 2.54* 2.79*   HGB 7.3* 7.8* 7.9* 8.5*   HCT 25.3* 27.5* 27.1* 30.0*   * 107* 107* 108*   MCH 30.9 30.5 31.1 30.5   MCHC 28.9* 28.4* 29.2* 28.3*   RDW 18.9* 19.2* 19.3* 19.3*    206 217 236       INR:   Recent Labs   Lab 09/15/22  1317   INR 0.94       Glucose:   Recent Labs   Lab 09/20/22  0400 09/19/22  2112 09/19/22  1609 09/19/22  1051 09/19/22  0817 09/19/22  0546   * 125* 190* 185* 157* 176*       Blood Gas:   Recent Labs   Lab 09/20/22  0701 09/19/22  2358 09/19/22  1718 09/14/22  0626   PHV 7.37  --  7.32 7.38   PCO2V 87*  --  99* 77*   PO2V 72*  --  25 58*   HCO3V 50*  --  51* 45*   LINDY 21.1*  --  20.8* 18.7*   O2PER 35 35 20 20       Culture Data No results for input(s): CULT in the last 168 hours.    Virology Data:   Lab Results   Component Value Date    FLUAH1 Not Detected 09/13/2022    FLUAH3 Not Detected 09/13/2022    [id number] Not Detected 09/13/2022    IFLUB Not Detected 09/13/2022    RSVA Not Detected 09/13/2022    RSVB Not Detected 09/13/2022    PIV1 Not Detected 09/13/2022    PIV2 Not Detected 09/13/2022    PIV3 Not Detected 09/13/2022    HMPV Not Detected 09/13/2022       Historical CMV results (last 3 of prior testing):  Lab Results   Component Value Date    CMVQNT Not Detected 09/13/2022    CMVQNT Not Detected 09/07/2022    CMVQNT Not Detected 09/01/2022     No results found for: CMVLOG    Urine Studies    Recent Labs   Lab Test 09/19/22  2254 08/01/22  0319 07/08/22  0831   URINEPH 7.0 8.0* 5.5   NITRITE Negative Negative Negative   LEUKEST Large* Negative Negative   WBCU 29*  --  1       Most Recent  [facility] Pulmonary Function Testing (FVC/FEV1 only)  FVC-Pre   Date Value Ref Range Status   09/07/2022 1.01 L    09/01/2022 1.07 L    08/24/2022 1.23 L    08/18/2022 1.33 L      FVC-%Pred-Pre   Date Value Ref Range Status   09/07/2022 33 %    09/01/2022 35 %    08/24/2022 40 %    08/18/2022 43 %      FEV1-Pre   Date Value Ref Range Status   09/07/2022 0.98 L    09/01/2022 1.02 L    08/24/2022 1.17 L    08/18/2022 1.22 L      FEV1-%Pred-Pre   Date Value Ref Range Status   09/07/2022 40 %    09/01/2022 42 %    08/24/2022 48 %    08/18/2022 50 %        IMAGING    Recent Results (from the past 48 hour(s))   XR Chest 2 Views    Narrative    Chest 2 views    INDICATION: Interval follow-up, lung transplant    COMPARISON: Yesterday    FINDINGS: Clamshell sternotomy from prior bilateral lung  transplantation again noted. Continued streaky pulmonary opacities  appear similar. No ectopic air collection. Costophrenic angle blunting  on the left appears similar. Left basilar chest catheter also appears  similar. Right basilar thoracostomy tube appears similar. Right apical  volume loss appears similar. Calcification at the aortic knob appears  similar. Heart size borderline enlarged.      Impression    IMPRESSION: No significant changes in patient with underlying  bilateral lung transplantation.    ALVINA HARRY MD         SYSTEM ID:  [id number]

## 2022-09-20 NOTE — PHARMACY-CONSULT NOTE
Pharmacy received a consult to review this patient's medications for drugs that could be causing her current hypermagnesemia.     After review of the patients current medications and recently discontinued medications, there are no medications that are likely causing her hypermagnesemia.     Pharmacy will continue to monitor.    Thank you for the consult.     Alexander Boyd, PharmD  PGY1 Pharmacist Resident

## 2022-09-20 NOTE — PROVIDER NOTIFICATION
Critical lab result: VBG PCO2 87 and Bicarb 50    When nursing was notified of critical lab: 0721    Provider notified: Dr. Myles    Time of notification (if not done within 30 minutes, provide rationale): 07:27

## 2022-09-20 NOTE — PROGRESS NOTES
9/20/2022 - 12:39 AM    Brief Hospitalist Cross Cover Note    I was contacted by:  RIYA Avilez    Issue / Concern:  LAB RESULTS - ABG results    /48 (BP Location: Right arm, Cuff Size: Adult Regular)   Pulse 79   Temp 98.2  F (36.8  C) (Oral)   Resp 18   Wt 68.2 kg (150 lb 6.4 oz)   SpO2 98%   BMI 26.64 kg/m    Not otherwise examined     Latest Reference Range & Units 09/19/22 23:58   pH Arterial 7.35 - 7.45  7.39   pCO2 Arterial 35 - 45 mm Hg 82 (HH)   PO2 Arterial 80 - 105 mm Hg 147 (H)   Bicarbonate Arterial 21 - 28 mmol/L 50 (HH)   Base Excess Art -9.0 - 1.8 mmol/L 22.3 (H)   FIO2  35   Oxyhemoglobin Arterial 92 - 100 % 97     Assessment/Plan:    Chronic compensated respiratory failure with elevated CO2 and normal pH.    RT note from earlier this evening:  Pt placed on BIPAP for increase work of breathe/alter mental status.  Current settings are S/T, 10/5, RR 12, FiO2 30%.    Plan:  Continue close observation and monitoring    Jcarlos Myles MD

## 2022-09-20 NOTE — PROGRESS NOTES
Critical lab result: VBG PCO2 99 and Bicarb 51    When nursing was notified of critical lab:1741    Provider notified: Dr. Pritchard at 1745    Ordered placed for Bipap

## 2022-09-20 NOTE — PROGRESS NOTES
Shift 4429-7880   ?  A/I : Monitored vitals and assessed pt status. A0x2-3, occasionally disoriented to time and place, more lethargic, MD aware. Vitals stable on 1L NC most shift but BiPap started tonight with altered mental status and CO2 from VBG critical at 99%. Not on tele. Afebrile. Urinating adequately via commode. BMx3, PRN imodium given. Fair appetite on full liquid diet. G/J tube with small amount of tan drainage, g tube to gravity and J tube with continuous TF. Denies chest pain, palpitations, , nausea. Does endorse some dizziness with standing and ANAYA. Pain related to CT managed with PRN oxycodone and tylenol. No new skin concerns observed, vinayak area reddened and sore, jesu and barrier cream applied. Up with assist 1-2 with GB. KIT CRAWFORD.     Changed:  Running:   PRN: tylenol, oxycodone and imodium  ?  P: Continue to monitor Pt status and report changes to [facility].

## 2022-09-20 NOTE — PROGRESS NOTES
Shift 5737-5734   ?  A/I : Monitored vitals and assessed pt status. Disoriented to time, sometimes place, lethargic. Patient states she feels foggy and exhausted. Vitals stable on Bipap at 30 FiO2, took break from Bipap from 2607-9869. No tele. Afebrile. Urinating adequately via commode. Loose BM x2 this AM, C diff rule out and initiated enteric precautions. TF at 40 mL/hr through J tube, g tube open to gravity. Denies chest pain, palpitations, shortness of breath, nausea. ANAYA observed and patient endorses dizziness with standing. Pain related to CT sites well managed with scheduled tylenol. R and L CT sites patent and set to suction. Up with assist 1-2 with walker and GB, pivoting to commode. RPIV SL between IV antibiotics.     Changed: Started IV antiobiotics for possible UTI, Bipap now on continuously, imodium, gabapentin and trazadone on hold, Nephrology consulted, Echo, head CT completed  Running:   PRN:   ?  P: Continue to monitor Pt status and report changes to [facility].

## 2022-09-20 NOTE — PROGRESS NOTES
[facility]    Medicine Progress Note - Hospitalist Service, GOLD TEAM 10    Date of Admission:  9/9/2022    Assessment & Plan          62 year old female with pertinent hx of end stage COPD s/p bilateral sequential lung transplant (6/22) on triple IS ( MMF/Tacro/pred), pyloric ulcer, recent ERCP c/f hemobilia, gastroparesis s/p GJ tube placement and Percutaneous J tube presents for a planned admission from transplant pulmonology to work up antibody medicated rejection versus infection.     1.  Acute metabolic encephalopathy, not present on admission  This is the main problem being managed for 9/20/2022.  Differential diagnoses include:  1.  Uremic encephalopathy secondary to elevated BUN and related to medications and then needed and appropriately aggressive diuresis the patient required during this admission  2.  Urinary tract infection, given suprapubic pain and tenderness with mild pyuria  3.  Another infection including C. difficile colitis given more than 3 loose bowel movements without any laxatives for the last 72 hours, abdominal discomfort and elevated inflammatory markers [procalcitonin]  4.  Medications related to trazodone and gabapentin  5.  Acute stroke, very less likely given nonfocal clinical exam    I ordered the following:  -Held diuresis  -Low threshold to administer intravenous fluids with LR at 1 L  -Consultation to nephrology for consideration of dialysis if the patient does not improve with less intensive measures  -Awaiting urine culture  -Started ceftriaxone at 2 g IV every 24 hours for 5 days  -Ordered C. difficile battery, latest C. difficile testing was on 9/10/2022  -Held trazodone and gabapentin  -Held Imodium  -Ordered CT head    2.  Primary respiratory acidosis, likely chronic with secondary metabolic alkalosis, not clear if present on admission  This is the second problem being managed for 9/20/2022.  The respiratory acidosis is likely  chronic in nature with normal pH, however an acute component secondary to an underlying primary lung dysfunction including antibody mediated rejection or an active infection are also possibility.  The metabolic alkalosis is likely contraction alkalosis secondary to the highly needed diuresis that the patient received.  -Repeat sputum culture  -Daily chest x-ray  -Continuous BiPAP with a break of 1 hour for nutrition while being on nasal cannula  -Repeat venous blood gas this afternoon and in the a.m.    3.  Acute hypoxic hypercarbic respiratory failure secondary to bibasilar pleural effusion currently with chest tubes bilaterally on top of end-stage COPD s/p bilateral sequential lung transplant on 6/22/2022, all are present on admission  This is the main problem that led to this admission.  The patient has bilateral chest tubes.  -Continue daily chest x-ray  -Ordered CT chest for 9/20/2022 noncontrast for follow-up of pleural effusion s/p placement of chest tubes  -Tacrolimus level ordered for 9/22  -Donor specific antigen for 9/21/2022  -Azathioprine was started on 9/20/2022  -Prednisone taper to be performed on 10/13/2022  -Continue BiPAP as detailed above  -Continue valganciclovir for cytomegalovirus prophylaxis through 9/28/2022  -Continue dapsone for PJP prophylaxis  -Continue folic acid while on dapsone  -Continue nystatin.  Prophylaxis protocol post lung transplant    4.  Acute blood loss anemia secondary to pyloric ulcer on top of chronic anemia of chronic disease, all are present on admission  -Continue pantoprazole 40 mg twice daily  -Repeat endoscopy for October 2022    5.  Resolved medical problems  #Acute on chronic heart failure with preserved ejection fraction LVEF 65%, the patient was appropriately diuresed  #Steroid-induced hyperglycemia and diabetes mellitus, continue insulin Lantus with insulin NovoLog sliding scale     6.  Chronic medical problems   # extra hepatic and intrahepatic biliary  dilation c/f hemobilia   # Intra ductal papillary mucinous neoplasm   ERCP 8/11 showed hemobilia.   - Monitor LFTs      #CKD stage III   Patient has been variable GFRs 30-50s in the last few months. Most recent Cr trend 1.5-1.9 1.56 9/8/22.   - CTM      #Severe gastroparesis s/p  GJ tube placement 7/27/2022  - RD nutrition consult placed for TF  - Percutaneous j tube in place with G venting to gravity      #HTN  # A flutter with RVR/SVT   . Has recently completed zio patch.  - Continue PTA metoprolol 50 mg BID        Diet: Adult Formula Drip Feeding: Continuous Nepro with Carbsteady; Jejunostomy; Goal Rate: 40 ml/hr--okay to begin at goal once new formula arrives on unit.; mL/hr; Medication - Feeding Tube Flush Frequency: At least 15-30 mL water before and after med...  Full Liquid Diet    DVT Prophylaxis: Heparin SQ  Dong Catheter: Not present  Central Lines: None  Cardiac Monitoring: None  Code Status: Full Code      Disposition Plan         The patient's care was discussed with the Bedside Nurse.    Abbe Abebe MD  Hospitalist Service, GOLD TEAM [address] [place] [address]  Securely message with the [employer] Web Console (learn more here)  Text page via [facility] Paging/Directory   Please see signed in provider for up to date coverage information      Clinically Significant Risk Factors Present on Admission                      ______________________________________________________________________    Interval History   The patient reported not feeling herself mentally. She denied any paresis or paralysis  The patient admitted to supra pubic pain. The patient denied any urgency or frequency.     4 point ROS is otherwise negative    Data reviewed today: I reviewed all medications, new labs and imaging results over the last 24 hours. I personally reviewed the chest x-ray image(s) showing no consolidation.    Physical Exam   Vital Signs: Temp: 98.3  F (36.8  C) Temp src: Axillary BP:  109/54 Pulse: 75   Resp: 14 SpO2: 98 % O2 Device: BiPAP/CPAP Oxygen Delivery: 2 LPM  Weight: 150 lbs 9.6 oz  Constitutional: Awake, cooperative, no apparent distress, stuperous  Eyes: Conjunctiva and pupils examined and normal.  HEENT: Moist mucous membranes, normal dentition.  Respiratory: Clear to auscultation bilaterally, no crackles or wheezing.  Cardiovascular: Regular rate and rhythm, normal S1 and S2, and no murmur noted.  GI: Soft, non-distended, non-tender, normal bowel sounds.  Lymph/Hematologic: No anterior cervical or supraclavicular adenopathy.  Skin: No rashes, no cyanosis, no edema.  Musculoskeletal: No joint swelling, erythema or tenderness.  Neurologic: Cranial nerves 2-12 intact, normal strength and sensation.  Psychiatric: Alert, oriented to person, place and time, no obvious anxiety or depression.    Data   Recent Labs   Lab 09/20/22  1012 09/20/22  0701 09/20/22  0400 09/19/22  0817 09/19/22  0546 09/18/22  1041 09/18/22  0619 09/15/22  1609 09/15/22  1317   WBC  --  5.9  --   --  7.0  --  6.9   < >  --    HGB  --  7.3*  --   --  7.8*  --  7.9*   < >  --    MCV  --  107*  --   --  107*  --  107*   < >  --    PLT  --  186  --   --  206  --  217   < >  --    INR  --   --   --   --   --   --   --   --  0.94   NA  --  141  --   --  140  --  142   < >  --    POTASSIUM  --  4.3  --   --  4.5  --  4.8   < >  --    CHLORIDE  --  87*  --   --  89*  --  91*   < >  --    CO2  --  46*  --   --  47*  --  43*   < >  --    BUN  --  112.0*  --   --  107.0*  --  109.0*   < >  --    CR  --  1.78*  --   --  1.78*  --  1.86*   < >  --    ANIONGAP  --  8  --   --  4*  --  8   < >  --    ESTUARDO  --  9.0  --   --  9.3  --  9.4   < >  --    * 163* 178*   < > 176*   < > 166*   < >  --    ALBUMIN  --  3.1*  --   --  3.2*  --   --    < >  --    PROTTOTAL  --  5.2*  --   --  5.7*  --   --    < >  --    BILITOTAL  --  <0.2  --   --  <0.2  --   --    < >  --    ALKPHOS  --  75  --   --  90  --   --    < >  --    ALT  --   9*  --   --  6*  --   --    < >  --    AST  --  15  --   --  13  --   --    < >  --     < > = values in this interval not displayed.     Recent Results (from the past 24 hour(s))   XR Chest Port 1 View    Narrative    EXAM: XR CHEST PORT 1 VIEW  9/20/2022 10:30 AM      HISTORY: interval follow up, chest tubes, lung transplant history    COMPARISON: Chest x-ray 9/19/2022, CT 9/8/2022    FINDINGS: Portable upright AP radiograph of the chest. Postsurgical  changes of bilateral lung transplantation with clamshell sternotomy  wires. Stable positioning of bilateral chest tubes. The trachea is  midline. The cardiac silhouette is not enlarged. Atherosclerotic  calcifications of the aortic arch. Stable small left pleural effusion.  Small amount of right fissural fluid. No pneumothorax. Mild streaky  perihilar and basilar opacities are slightly decreased. The visualized  upper abdomen is unremarkable.       Impression    IMPRESSION:   1. Small left pleural effusion is stable.  2. Mild streaky perihilar and basilar opacities are decreased.    I have personally reviewed the examination and initial interpretation  and I agree with the findings.    JOHANN MORAES MD         SYSTEM ID:  [id number]   CT Head w/o Contrast    Narrative    EXAM: CT HEAD W/O CONTRAST  9/20/2022 11:32 AM     HISTORY:  altered mental status of unclear etiology       COMPARISON:  CT head 7/22/2022    TECHNIQUE: Using multidetector thin collimation helical acquisition  technique, axial, coronal and sagittal CT images from the skull base  to the vertex were obtained without intravenous contrast.   (topogram) image(s) also obtained and reviewed.    FINDINGS:  No acute intracranial hemorrhage, mass effect, or midline shift. No  acute loss of gray-white matter differentiation in the cerebral  hemispheres. Ventricles are proportionate to the cerebral sulci. Clear  basal cisterns.    The bony calvaria and the bones of the skull base are normal.  The  visualized portions of the paranasal sinuses and mastoid air cells are  clear. Grossly normal orbits.       Impression    IMPRESSION: Mild motion artifact. No acute intracranial pathology.    I have personally reviewed the examination and initial interpretation  and I agree with the findings.    ANASTASIA WHITMAN MD         SYSTEM ID:  [id number]     Medications     dextrose       - MEDICATION INSTRUCTIONS -       - MEDICATION INSTRUCTIONS -       - MEDICATION INSTRUCTIONS -       - MEDICATION INSTRUCTIONS -         acetaminophen  975 mg Per J Tube TID     azaTHIOprine  100 mg Per J Tube Daily     calcium carbonate 600 mg-vitamin D 400 units  1 tablet Per J Tube BID w/meals     cefTRIAXone  2 g Intravenous Q24H     cyanocobalamin  500 mcg Per Feeding Tube Daily     dapsone  50 mg Per J Tube Q Mon Wed Fri AM     folic acid  1 mg Per Feeding Tube Daily     [Held by provider] gabapentin  200 mg Per Feeding Tube TID     heparin ANTICOAGULANT  5,000 Units Subcutaneous Q12H     insulin aspart  1-6 Units Subcutaneous Q6H     insulin glargine  6 Units Subcutaneous QAM AC     menthol  1 patch Topical QAM    And     menthol   Transdermal Q8H     menthol   Transdermal Daily     metoprolol tartrate  12.5 mg Per J Tube BID     multivitamin w/minerals  1 tablet Per Feeding Tube Daily     nystatin  1,000,000 Units Swish & Swallow 4x Daily     pantoprazole  40 mg Per J Tube BID     predniSONE  10 mg Per J Tube Daily     predniSONE  7.5 mg Per J Tube QPM     sodium chloride (PF)  3 mL Intracatheter Q8H     tacrolimus  4 mg Per J Tube QPM     tacrolimus  4.5 mg Per J Tube QAM     [Held by provider] traZODone  25 mg Per J Tube At Bedtime    Or     [Held by provider] traZODone  50 mg Per J Tube At Bedtime     valGANciclovir  450 mg Oral or Feeding Tube Once per day on Mon Wed Fri

## 2022-09-20 NOTE — PLAN OF CARE
Diagnosis: BSLT 6/28 c/b by pleural effusions. Admitted 9/9 w/ increased dyspnea, morning hemoptysis, increased BLE edema.      Vitals: /50 (BP Location: Right arm, Cuff Size: Adult Regular)   Pulse 79   Temp 98  F (36.7  C) (Axillary)   Resp 19   Wt 68.3 kg (150 lb 9.6 oz)   SpO2 99%   BMI 26.68 kg/m        Neuro: A/o x4, denies headache. Baseline shaky.   Resp: BiPAP overnight 35% FiO2, sating high 90's. ANAYA.   Cardiac: no tele orders, pressures stable.   GI/: GJ tube w/ TF running 40 mL/hr at goal. Tube feeds and medications through J, G to gravity. Watery/loose BM x1 overnight.   Skin: compression stockings stayed on overnight.   LDAs: 2 CT to 2 atriums -20 mmH2O. GJ tube. LPIV saline locked.   Mobility: x1 w/ walker.      Goals/Plan: continue plan of care, notify team of any significant changes

## 2022-09-21 ENCOUNTER — APPOINTMENT (OUTPATIENT)
Dept: CT IMAGING | Facility: CLINIC | Age: 62
DRG: 205 | End: 2022-09-21
Attending: INTERNAL MEDICINE
Payer: MEDICARE

## 2022-09-21 ENCOUNTER — APPOINTMENT (OUTPATIENT)
Dept: PHYSICAL THERAPY | Facility: CLINIC | Age: 62
DRG: 205 | End: 2022-09-21
Attending: INTERNAL MEDICINE
Payer: MEDICARE

## 2022-09-21 ENCOUNTER — APPOINTMENT (OUTPATIENT)
Dept: ULTRASOUND IMAGING | Facility: CLINIC | Age: 62
DRG: 205 | End: 2022-09-21
Attending: INTERNAL MEDICINE
Payer: MEDICARE

## 2022-09-21 ENCOUNTER — APPOINTMENT (OUTPATIENT)
Dept: GENERAL RADIOLOGY | Facility: CLINIC | Age: 62
DRG: 205 | End: 2022-09-21
Attending: PHYSICIAN ASSISTANT
Payer: MEDICARE

## 2022-09-21 LAB
ALBUMIN SERPL BCG-MCNC: 3.2 G/DL (ref 3.5–5.2)
ALP SERPL-CCNC: 82 U/L (ref 35–104)
ALT SERPL W P-5'-P-CCNC: 9 U/L (ref 10–35)
ANION GAP SERPL CALCULATED.3IONS-SCNC: 5 MMOL/L (ref 7–15)
AST SERPL W P-5'-P-CCNC: 16 U/L (ref 10–35)
BACTERIA PLR CULT: NO GROWTH
BACTERIA UR CULT: NORMAL
BASE EXCESS BLDV CALC-SCNC: -1.7 MMOL/L (ref -7.7–1.9)
BASE EXCESS BLDV CALC-SCNC: 24.2 MMOL/L (ref -7.7–1.9)
BASOPHILS # BLD AUTO: 0 10E3/UL (ref 0–0.2)
BASOPHILS NFR BLD AUTO: 0 %
BILIRUB SERPL-MCNC: <0.2 MG/DL
BUN SERPL-MCNC: 110 MG/DL (ref 8–23)
C DIFF TOX B STL QL: NEGATIVE
CALCIUM SERPL-MCNC: 9.3 MG/DL (ref 8.8–10.2)
CHLORIDE SERPL-SCNC: 89 MMOL/L (ref 98–107)
CREAT SERPL-MCNC: 1.63 MG/DL (ref 0.51–0.95)
CYSTATIN C (ROCHE): 4.3 MG/L (ref 0.6–1)
CYSTATIN C (ROCHE): 4.7 MG/L (ref 0.6–1)
DEPRECATED HCO3 PLAS-SCNC: 48 MMOL/L (ref 22–29)
EOSINOPHIL # BLD AUTO: 0.1 10E3/UL (ref 0–0.7)
EOSINOPHIL NFR BLD AUTO: 2 %
ERYTHROCYTE [DISTWIDTH] IN BLOOD BY AUTOMATED COUNT: 19.3 % (ref 10–15)
GFR SERPL CREATININE-BSD FRML MDRD: 10 ML/MIN/1.73M2
GFR SERPL CREATININE-BSD FRML MDRD: 36 ML/MIN/1.73M2
GFR SERPL CREATININE-BSD FRML MDRD: 9 ML/MIN/1.73M2
GLUCOSE BLDC GLUCOMTR-MCNC: 139 MG/DL (ref 70–99)
GLUCOSE BLDC GLUCOMTR-MCNC: 162 MG/DL (ref 70–99)
GLUCOSE BLDC GLUCOMTR-MCNC: 163 MG/DL (ref 70–99)
GLUCOSE BLDC GLUCOMTR-MCNC: 168 MG/DL (ref 70–99)
GLUCOSE BLDC GLUCOMTR-MCNC: 180 MG/DL (ref 70–99)
GLUCOSE SERPL-MCNC: 134 MG/DL (ref 70–99)
HCO3 BLDV-SCNC: 51 MMOL/L (ref 21–28)
HCO3 BLDV-SCNC: 52 MMOL/L (ref 21–28)
HCT VFR BLD AUTO: 26.4 % (ref 35–47)
HGB BLD-MCNC: 7.9 G/DL (ref 11.7–15.7)
IMM GRANULOCYTES # BLD: 0.1 10E3/UL
IMM GRANULOCYTES NFR BLD: 2 %
LYMPHOCYTES # BLD AUTO: 0.2 10E3/UL (ref 0.8–5.3)
LYMPHOCYTES NFR BLD AUTO: 4 %
MAGNESIUM SERPL-MCNC: 2.9 MG/DL (ref 1.7–2.3)
MCH RBC QN AUTO: 31.5 PG (ref 26.5–33)
MCHC RBC AUTO-ENTMCNC: 29.9 G/DL (ref 31.5–36.5)
MCV RBC AUTO: 105 FL (ref 78–100)
MONOCYTES # BLD AUTO: 0.5 10E3/UL (ref 0–1.3)
MONOCYTES NFR BLD AUTO: 8 %
NEUTROPHILS # BLD AUTO: 5.1 10E3/UL (ref 1.6–8.3)
NEUTROPHILS NFR BLD AUTO: 84 %
NRBC # BLD AUTO: 0 10E3/UL
NRBC BLD AUTO-RTO: 0 /100
O2/TOTAL GAS SETTING VFR VENT: 1 %
O2/TOTAL GAS SETTING VFR VENT: 21 %
PCO2 BLDV: 72 MM HG (ref 40–50)
PCO2 BLDV: 82 MM HG (ref 40–50)
PH BLDV: 7.41 [PH] (ref 7.32–7.43)
PH BLDV: 7.46 [PH] (ref 7.32–7.43)
PLATELET # BLD AUTO: 191 10E3/UL (ref 150–450)
PO2 BLDV: 16 MM HG (ref 25–47)
PO2 BLDV: 24 MM HG (ref 25–47)
POTASSIUM SERPL-SCNC: 4.1 MMOL/L (ref 3.4–5.3)
PROT SERPL-MCNC: 5.6 G/DL (ref 6.4–8.3)
RBC # BLD AUTO: 2.51 10E6/UL (ref 3.8–5.2)
SARS-COV-2 RNA RESP QL NAA+PROBE: NEGATIVE
SODIUM SERPL-SCNC: 142 MMOL/L (ref 136–145)
WBC # BLD AUTO: 6.1 10E3/UL (ref 4–11)

## 2022-09-21 PROCEDURE — U0003 INFECTIOUS AGENT DETECTION BY NUCLEIC ACID (DNA OR RNA); SEVERE ACUTE RESPIRATORY SYNDROME CORONAVIRUS 2 (SARS-COV-2) (CORONAVIRUS DISEASE [COVID-19]), AMPLIFIED PROBE TECHNIQUE, MAKING USE OF HIGH THROUGHPUT TECHNOLOGIES AS DESCRIBED BY CMS-2020-01-R: HCPCS | Performed by: PHYSICIAN ASSISTANT

## 2022-09-21 PROCEDURE — 250N000013 HC RX MED GY IP 250 OP 250 PS 637: Performed by: PEDIATRICS

## 2022-09-21 PROCEDURE — 36415 COLL VENOUS BLD VENIPUNCTURE: CPT | Performed by: INTERNAL MEDICINE

## 2022-09-21 PROCEDURE — 250N000011 HC RX IP 250 OP 636: Performed by: INTERNAL MEDICINE

## 2022-09-21 PROCEDURE — 250N000009 HC RX 250: Performed by: NURSE PRACTITIONER

## 2022-09-21 PROCEDURE — 99222 1ST HOSP IP/OBS MODERATE 55: CPT | Mod: 24 | Performed by: INTERNAL MEDICINE

## 2022-09-21 PROCEDURE — 86833 HLA CLASS II HIGH DEFIN QUAL: CPT | Performed by: PHYSICIAN ASSISTANT

## 2022-09-21 PROCEDURE — G1010 CDSM STANSON: HCPCS | Performed by: RADIOLOGY

## 2022-09-21 PROCEDURE — 93971 EXTREMITY STUDY: CPT | Mod: 26 | Performed by: RADIOLOGY

## 2022-09-21 PROCEDURE — 250N000013 HC RX MED GY IP 250 OP 250 PS 637: Performed by: STUDENT IN AN ORGANIZED HEALTH CARE EDUCATION/TRAINING PROGRAM

## 2022-09-21 PROCEDURE — 93971 EXTREMITY STUDY: CPT | Mod: LT

## 2022-09-21 PROCEDURE — 250N000013 HC RX MED GY IP 250 OP 250 PS 637: Performed by: NURSE PRACTITIONER

## 2022-09-21 PROCEDURE — 214N000001 HC R&B CCU UMMC

## 2022-09-21 PROCEDURE — 999N000157 HC STATISTIC RCP TIME EA 10 MIN

## 2022-09-21 PROCEDURE — 71250 CT THORAX DX C-: CPT | Mod: MG

## 2022-09-21 PROCEDURE — 86832 HLA CLASS I HIGH DEFIN QUAL: CPT | Performed by: PHYSICIAN ASSISTANT

## 2022-09-21 PROCEDURE — 99233 SBSQ HOSP IP/OBS HIGH 50: CPT | Performed by: INTERNAL MEDICINE

## 2022-09-21 PROCEDURE — 97530 THERAPEUTIC ACTIVITIES: CPT | Mod: GP

## 2022-09-21 PROCEDURE — 82610 CYSTATIN C: CPT | Performed by: INTERNAL MEDICINE

## 2022-09-21 PROCEDURE — 80053 COMPREHEN METABOLIC PANEL: CPT | Performed by: INTERNAL MEDICINE

## 2022-09-21 PROCEDURE — 85025 COMPLETE CBC W/AUTO DIFF WBC: CPT | Performed by: INTERNAL MEDICINE

## 2022-09-21 PROCEDURE — 36415 COLL VENOUS BLD VENIPUNCTURE: CPT | Performed by: PHYSICIAN ASSISTANT

## 2022-09-21 PROCEDURE — 99207 PR NON-BILLABLE SERV PER CHARTING: CPT | Performed by: PHYSICIAN ASSISTANT

## 2022-09-21 PROCEDURE — 94660 CPAP INITIATION&MGMT: CPT

## 2022-09-21 PROCEDURE — 250N000013 HC RX MED GY IP 250 OP 250 PS 637

## 2022-09-21 PROCEDURE — G1010 CDSM STANSON: HCPCS

## 2022-09-21 PROCEDURE — 97116 GAIT TRAINING THERAPY: CPT | Mod: GP

## 2022-09-21 PROCEDURE — 71046 X-RAY EXAM CHEST 2 VIEWS: CPT | Mod: 26 | Performed by: RADIOLOGY

## 2022-09-21 PROCEDURE — 83735 ASSAY OF MAGNESIUM: CPT | Performed by: INTERNAL MEDICINE

## 2022-09-21 PROCEDURE — 250N000012 HC RX MED GY IP 250 OP 636 PS 637: Performed by: PHYSICIAN ASSISTANT

## 2022-09-21 PROCEDURE — 71250 CT THORAX DX C-: CPT | Mod: 26 | Performed by: RADIOLOGY

## 2022-09-21 PROCEDURE — 250N000012 HC RX MED GY IP 250 OP 636 PS 637: Performed by: NURSE PRACTITIONER

## 2022-09-21 PROCEDURE — 250N000011 HC RX IP 250 OP 636: Performed by: STUDENT IN AN ORGANIZED HEALTH CARE EDUCATION/TRAINING PROGRAM

## 2022-09-21 PROCEDURE — 82803 BLOOD GASES ANY COMBINATION: CPT | Performed by: PHYSICIAN ASSISTANT

## 2022-09-21 PROCEDURE — 71046 X-RAY EXAM CHEST 2 VIEWS: CPT

## 2022-09-21 PROCEDURE — 99233 SBSQ HOSP IP/OBS HIGH 50: CPT | Mod: 24 | Performed by: PHYSICIAN ASSISTANT

## 2022-09-21 RX ADMIN — TACROLIMUS 4.5 MG: 5 CAPSULE ORAL at 08:06

## 2022-09-21 RX ADMIN — INSULIN GLARGINE 6 UNITS: 100 INJECTION, SOLUTION SUBCUTANEOUS at 08:12

## 2022-09-21 RX ADMIN — OXYCODONE HYDROCHLORIDE 5 MG: 5 SOLUTION ORAL at 20:43

## 2022-09-21 RX ADMIN — NYSTATIN 1000000 UNITS: 100000 SUSPENSION ORAL at 13:48

## 2022-09-21 RX ADMIN — CEFTRIAXONE SODIUM 2 G: 2 INJECTION, POWDER, FOR SOLUTION INTRAMUSCULAR; INTRAVENOUS at 11:08

## 2022-09-21 RX ADMIN — Medication 1 TABLET: at 08:10

## 2022-09-21 RX ADMIN — Medication 12.5 MG: at 20:43

## 2022-09-21 RX ADMIN — FOLIC ACID 1 MG: 1 TABLET ORAL at 08:07

## 2022-09-21 RX ADMIN — INSULIN ASPART 1 UNITS: 100 INJECTION, SOLUTION INTRAVENOUS; SUBCUTANEOUS at 10:02

## 2022-09-21 RX ADMIN — Medication 12.5 MG: at 08:07

## 2022-09-21 RX ADMIN — ACETAMINOPHEN 975 MG: 325 TABLET, FILM COATED ORAL at 13:48

## 2022-09-21 RX ADMIN — HEPARIN SODIUM 5000 UNITS: 5000 INJECTION, SOLUTION INTRAVENOUS; SUBCUTANEOUS at 08:12

## 2022-09-21 RX ADMIN — HEPARIN SODIUM 5000 UNITS: 5000 INJECTION, SOLUTION INTRAVENOUS; SUBCUTANEOUS at 20:43

## 2022-09-21 RX ADMIN — CALCIUM CARBONATE 600 MG (1,500 MG)-VITAMIN D3 400 UNIT TABLET 1 TABLET: at 08:10

## 2022-09-21 RX ADMIN — PREDNISONE 7.5 MG: 2.5 TABLET ORAL at 20:43

## 2022-09-21 RX ADMIN — INSULIN ASPART 1 UNITS: 100 INJECTION, SOLUTION INTRAVENOUS; SUBCUTANEOUS at 16:43

## 2022-09-21 RX ADMIN — Medication 100 MG: at 08:07

## 2022-09-21 RX ADMIN — VALGANCICLOVIR HYDROCHLORIDE 450 MG: 50 POWDER, FOR SOLUTION ORAL at 08:07

## 2022-09-21 RX ADMIN — CALCIUM CARBONATE 600 MG (1,500 MG)-VITAMIN D3 400 UNIT TABLET 1 TABLET: at 18:44

## 2022-09-21 RX ADMIN — NYSTATIN 1000000 UNITS: 100000 SUSPENSION ORAL at 20:42

## 2022-09-21 RX ADMIN — Medication 40 MG: at 20:43

## 2022-09-21 RX ADMIN — NYSTATIN 1000000 UNITS: 100000 SUSPENSION ORAL at 16:42

## 2022-09-21 RX ADMIN — Medication 40 MG: at 09:59

## 2022-09-21 RX ADMIN — OXYCODONE HYDROCHLORIDE 5 MG: 5 SOLUTION ORAL at 04:46

## 2022-09-21 RX ADMIN — PREDNISONE 10 MG: 10 TABLET ORAL at 08:13

## 2022-09-21 RX ADMIN — INSULIN ASPART 1 UNITS: 100 INJECTION, SOLUTION INTRAVENOUS; SUBCUTANEOUS at 04:38

## 2022-09-21 RX ADMIN — CYANOCOBALAMIN TAB 500 MCG 500 MCG: 500 TAB at 08:10

## 2022-09-21 RX ADMIN — NYSTATIN 1000000 UNITS: 100000 SUSPENSION ORAL at 08:12

## 2022-09-21 RX ADMIN — ACETAMINOPHEN 975 MG: 325 TABLET, FILM COATED ORAL at 08:09

## 2022-09-21 RX ADMIN — DAPSONE 50 MG: 100 TABLET ORAL at 08:06

## 2022-09-21 RX ADMIN — TACROLIMUS 4 MG: 5 CAPSULE ORAL at 18:44

## 2022-09-21 RX ADMIN — OXYCODONE HYDROCHLORIDE 5 MG: 5 SOLUTION ORAL at 14:01

## 2022-09-21 RX ADMIN — ACETAMINOPHEN 975 MG: 325 TABLET, FILM COATED ORAL at 20:43

## 2022-09-21 RX ADMIN — MENTHOL 1 PATCH: 205.5 PATCH TOPICAL at 08:12

## 2022-09-21 ASSESSMENT — ACTIVITIES OF DAILY LIVING (ADL)
ADLS_ACUITY_SCORE: 34
ADLS_ACUITY_SCORE: 38
ADLS_ACUITY_SCORE: 34
ADLS_ACUITY_SCORE: 38

## 2022-09-21 NOTE — PROGRESS NOTES
Pulmonary Medicine  Cystic Fibrosis - Lung Transplant Team  Progress Note  2022       Patient: Sofie Rodriguez  MRN: [id number]  : 1960 (age 62 year old)  Transplant: 2022 (Lung), POD#85  Admission date: 2022    Assessment & Plan:     Sofie Rodriguez is a 62-year-old female s/p BSLT (22) for COPD complicated by persistent bilateral pleural effusions, persistent CO2 retention, right hemidiaphragm palsy, BACILIO (dialysis -), C. diff colitis, gastroparesis s/p GJ tube placement (22), GI bleed 2/2 pyloric ulcer, hemobilia s/p ERCP and MRCP (), and persistent vasoplegia.  Other history notable for HFpEF, NTM colonoization, hepatitis C, and methamphetamine use.  Patient admitted 22 with persistent dyspnea (increased with activity), daily morning hemoptysis, and increased BLE edema.  Also noted to have persistent, increased bilateral pleural effusions, diffuse bilateral groundglass changes, and more focal appearing LLL infiltrates on imaging.  Treating with aggressive diuresis with some improvement in symptoms and hypoxia.  Hemoptysis resolved, mild hypoxia and ANAYA persisting.  S/p right pigtail chest tube and aspiration of left pleural effusion () with subsequent pigtail chest tube (9/15) with lytic therapy.  Output and imaging relatively stable.  Worsening mentation and tremors noted .  Also, with worsening hypercapnia, placed on BiPAP.  Started ABX for possible UTI.     Today's recommendations:  - Following pending cultures, sputum culture ordered if able to collect, UTI ABX per primary team  - Considering high dose steroids pending further evaluation with near euvolemia, defer for now  - Okay to remain off BiPAP during the day, repeat VBG ordered at 1600 to evaluate need for overnight BiPAP (if with worsening hypercapnia to 90s/change in mentation), plan discussed with RN and RT  - Bilateral chest tubes adjusted to water seal, revisit removing right chest tube  tomorrow pending output  - CXR daily while chest tubes remain  - Repeat chest CT today as below  - LLE US to evaluate for DVT given increased swelling  - Tacrolimus level ordered tomorrow  - Prednisone taper due 10/13 (not yet ordered)  - Monitor for worsening anemia with resumption of dapsone  - VGCV for CMV ppx through 9/28  - DSA (9/21) pending  - EBV 10/7 (not yet ordered)  - IgG 9/29 (not yet ordered)  - Repeat EGD 10/13 to check healing of ulcer, sooner if hgb declines further     S/p bilateral sequential lung transplant (BSLT) for end stage COPD:  Acute hypoxia with chronic hypercapnia:   Pulmonary edema:  Persistent bibasilar pleural effusions:   Right hemidiaphragm palsy: Admitted with increased dyspnea with minimal exertion and small volume daily hemoptysis.  Also with marked decline in PFTs (FEV1 1.22L, 50% on 8/18 to 0.98L, 40% on 9/7).  Chest CT (9/8) with increased effusions and groundglass changes.  DSA positive but stable (as below).  BNP markedly elevated (30k) and also with increased BLE edema.  Etiology unclear and is likely multi-factorial with DDx including pulmonary edema from hypervolemia/progressive HFpEF, rejection (ACR +/- AMR), alveolar hemorrhage, and/or infection.  Aggressively diuresed with some improvement in symptoms.  Bronchoscopy (9/13) unremarkable, BAL of lingula sent, cultures no growth (GPC on gram stain only).  S/p right pigtail chest tube placement (9/13), transudative with moderate output initially but quickly decreasing.  S/p aspiration of left pleural effusion (9/13) and then pigtail chest tube (9/15) s/p full lytic course (916-9/19).  Still with some ANAYA but no further hemoptysis since ~9/12.  CXR (9/18) demonstrates small to resolved right effusion and ongoing small left effusion.  Placed on BiPAP 10/5 --> 15/5 with FiO2 30-35% 9/19 due to worsening hypercapnia.  Hypercapnia improved (72) on 9/21.   - Sputum culture ordered if able to collect  - Left pleural culture  (9/13, 9/15) NGTD  - Right pleural studies (9/13) NGTD  - Defer pulmonary ABX at this time  - Volume management per primary team (nephrology consult as below)  - Considering high dose steroids pending further evaluation with near euvolemia, defer for now  - Supplemental O2 to keep >92%, IS and Aerobika q1h w/a, okay to remain off BiPAP during the day, repeat VBG at 1600 today (ordered) to evaluate need for overnight BiPAP (if with worsening hypercapnia to 90s/change in mentation) and repeat VBG in the morning (ordered) --> plan discussed with RN and RT  - Bilateral chest tubes to suction --> adjust to water seal (ordered), revisit ability to remove right tomorrow  - CXR daily while chest tubes remain  - Chest CT today with decreased pleural effusions with continued prominent loculated component of right effusion and overall slight decrease in associated atelectasis (personally reviewed with Dr. Paredes)  - Consider metabolic cart study pending clinical course  - LLE US to evaluate for DVT given increased swelling     Immunosuppression:  - Tacrolimus 4.5 mg qAM / 4 mg qPM (increased 9/19).  Goal level 8-12.  Repeat level 9/22 (ordered).  - Azathioprine 100 mg daily (9/20, MMF stopped due to ongoing diarrhea)  - Prednisone 10 mg qAM / 7.5 mg qPM with next taper due 10/13 (not yet ordered), defer accelerated taper (with elevated BUN) at this time  Date AM dose (mg) PM dose (mg)   9/15/22 10 7.5   10/13/22 7.5 7.5   11/10/22 7.5 5   12/8/22 5 5   1/5/23 5 2.5      Prophylaxis:   - Dapsone qMWF for PJP ppx (resumed 9/19 monitor for worsening anemia, Bactrim stopped d/t hyperkalemia, will need to monitor for recurrence of anemia with dapsone; Pentamidine neb not tolerated on 9/7 after only 5 minutes d/t excessive coughing)  - VGCV for CMV ppx through 9/28, D+/R+, CMV (9/13) negative     Positive DSA: Newly positive on 8/10 with DQB2 mfi 2155.  Most recent DSA on 9/14 with ~stable DQB5 from prior (9295-->3820, decreased  from 7033 on 9/1).  - Repeat DSA (9/21) pending     EBV viremia: Low level (1374 on 9/7), not likely to be clinically significant.  - Follow EBV monthly (10/7, not yet ordered)    Hypogammaglobulinemia: IgG adequate at time of transplant, repeat level low (336) on 7/28.  S/p IVIG 7/30, tolerated well.  IgG on 8/29 adequate at 672.   - Follow IgG monthly (9/29, not yet ordered)     Other relevant problems being managed by the primary team:     Encephalopathy:  Tremors: Noted 9/19 with confusion as well as tremor.  DDx including hypercapnia, hyperammonia, infection, uremia, medication related.  VBG with worsening hypercapnia (99).  BUN elevated (112).  Ammonia normal (24).  UA with moderate blood, large leukocyte esterase, 3 RBC, 29 WBC, and many bacteria.  CRP normal, procal 0.1.  Urine culture (9/19) with mixture of urogenital josue.  Tacrolimus levels recently subtherapeutic.  Head CT (9/20) without acute intracranial pathology.  Nephrology consulted, did not feel hypermagnesemia or elevated urea causing clinical symptoms.  - Blood culture (9/19) NGTD  - ABX: IV ceftriaxone (9/20) per primary team given concern for UTI  - Management per primary team     Diarrhea: C diff negative on 9/10 and 9/20.  Likely medication related (MMF), but unable to transition to Myfortic given NPO.  Loperamide utilized with some benefit.  Goal to titrate to antidiarrheal 3 stools daily, management per primary.  Enteric stool panel negative 9/16.  Transitioned from MMF to AZA as above.  - Management per primary team     Severe gastroparesis:   Pyloric ulcer: Gastric emptying study 7/20 with severe gastric emptying delay (95% retention at 4 hours), s/p GJ tube placement in IR 7/27.  S/p EGD (8/3) noted to have pyloric ulcer and excessive gastric fluid and residual food.  S/p EGD (8/11) with mild erythema 2/2 GJ tube trauma seen near insertion site but no active bleeding.  - PPI BID  - TF continuous and ALL enteral medications via J  tube  - G tube to gravity drainage; full liquid diet for comfort only  - Repeat EGD 10/13 to check healing of ulcer, sooner if hgb declines further    We appreciate the excellent care provided by the [place] [id number] team.  Recommendations communicated via in person rounding and this note.  Will continue to follow along closely, please do not hesitate to call with any questions or concerns.    Patient discussed with Dr. Paredes.    Yuliet Aviles, FLOYD  Inpatient EMILY  Pulmonary CF/Transplant     Subjective & Interval History:     Feeling better this morning although slept poorly overnight in part due to BiPAP intolerance.  Denies hallucinations or confusion, tremors improved.  Remained on BiPAP majority of the day and overnight (settings increased from 10/5 to 15/5) with FiO2 30%, transitioned to 2L NC this morning.  Hypercapnia improved.  Denies change in breathing.  Minimal cough/no sputum.  Bilateral chest tubes to suction, moderate output on left, decreasing output on right.  Incisional pain managed with prn oxycodone.  Ongoing loose stools, denies abdominal pain or dysuria.      Review of Systems:     C: No fever, no chills, no recent change in weight (overall decreased)  INTEGUMENTARY/SKIN: No rash or obvious new lesions  ENT/MOUTH: No sore throat, no sinus pain, no nasal congestion or drainage  RESP: See interval history  CV: No chest pain, no palpitations, + peripheral edema  GI: No nausea, no vomiting, no reflux symptoms  : No dysuria  MUSCULOSKELETAL: See interval history  ENDOCRINE: Blood sugars with adequate control  NEURO: + headache, no numbness or tingling  PSYCHIATRIC: Mood stable    Physical Exam:     All notes, images, and labs from past 24 hours (at minimum) were reviewed.    Vital signs:  Temp: 98.6  F (37  C) Temp src: Oral BP: 116/66 Pulse: 90   Resp: 14 SpO2: 100 % O2 Device: BiPAP/CPAP Oxygen Delivery: 2 LPM   Weight: 68.4 kg (150 lb 14.4 oz)  I/O:     Intake/Output Summary (Last 24 hours)  at 9/21/2022 1052  Last data filed at 9/21/2022 0918  Gross per 24 hour   Intake 590 ml   Output 1555 ml   Net -965 ml     Constitutional: Lying upright in bed, in no apparent distress.   HEENT: Eyes with pink conjunctivae, anicteric.  Oral mucosa moist without lesions.   PULM: Diminished air flow to L>R base.  Minimal crackles.  No rhonchi, no wheezes.  Non-labored breathing on 2L NC.  CV: Normal S1 and S2.  RRR.  + systolic murmur.  No gallop or rub.  L>R BLE edema.   ABD: NABS, soft, nontender, nondistended.  PEG/J tube not visualized.    MSK: Moves all extremities.   NEURO: Alert and oriented to person, place, time, and situation, conversant and answering questions appropriately.  Mild tremors.   SKIN: Warm, dry.  No rash on limited exam.   PSYCH: Mood stable.     Lines, Drains, and Devices:  Peripheral IV 09/10/22 Anterior;Left Lower forearm (Active)   Site Assessment WDL 09/20/22 2100   Line Status Saline locked 09/20/22 2100   Dressing Intervention New dressing  09/10/22 0135   Phlebitis Scale 0-->no symptoms 09/20/22 2100   Infiltration Scale 0 09/20/22 2100   Number of days: 11     Data:     LABS    CMP:   Recent Labs   Lab 09/21/22  1001 09/21/22  0843 09/21/22  0734 09/21/22  0437 09/20/22  1012 09/20/22  0701 09/19/22  0817 09/19/22  0546 09/18/22  1041 09/18/22  0619 09/16/22  0839 09/16/22  0606   NA  --   --  142  --   --  141  --  140  --  142   < > 140   POTASSIUM  --   --  4.1  --   --  4.3  --  4.5  --  4.8   < > 4.7   CHLORIDE  --   --  89*  --   --  87*  --  89*  --  91*   < > 92*   CO2  --   --  48*  --   --  46*  --  47*  --  43*   < > 43*   ANIONGAP  --   --  5*  --   --  8  --  4*  --  8   < > 5*   * 163* 134* 168*   < > 163*   < > 176*   < > 166*   < > 160*   BUN  --   --  110.0*  --   --  112.0*  --  107.0*  --  109.0*   < > 100.0*   CR  --   --  1.63*  --   --  1.78*  --  1.78*  --  1.86*   < > 1.83*   GFRESTIMATED  --   --  36*  --   --  32*  --  32*  --  30*   < > 31*   ESTUARDO  --    --  9.3  --   --  9.0  --  9.3  --  9.4   < > 9.5   MAG  --   --   --   --   --  2.8*  --  2.7*  --   --   --  2.4*   PHOS  --   --   --   --   --  4.0  --  3.9  --   --   --  3.5   PROTTOTAL  --   --  5.6*  --   --  5.2*  --  5.7*  --   --   --  5.9*   ALBUMIN  --   --  3.2*  --   --  3.1*  --  3.2*  --   --   --  3.1*   BILITOTAL  --   --  <0.2  --   --  <0.2  --  <0.2  --   --   --  0.2   ALKPHOS  --   --  82  --   --  75  --  90  --   --   --  85   AST  --   --  16  --   --  15  --  13  --   --   --  21   ALT  --   --  9*  --   --  9*  --  6*  --   --   --  <5*    < > = values in this interval not displayed.     CBC:   Recent Labs   Lab 09/21/22  0734 09/20/22  0701 09/19/22  0546 09/18/22  0619   WBC 6.1 5.9 7.0 6.9   RBC 2.51* 2.36* 2.56* 2.54*   HGB 7.9* 7.3* 7.8* 7.9*   HCT 26.4* 25.3* 27.5* 27.1*   * 107* 107* 107*   MCH 31.5 30.9 30.5 31.1   MCHC 29.9* 28.9* 28.4* 29.2*   RDW 19.3* 18.9* 19.2* 19.3*    186 206 217       INR:   Recent Labs   Lab 09/15/22  1317   INR 0.94       Glucose:   Recent Labs   Lab 09/21/22  1001 09/21/22  0843 09/21/22  0734 09/21/22  0437 09/20/22  2100 09/20/22  1551   * 163* 134* 168* 136* 177*       Blood Gas:   Recent Labs   Lab 09/21/22  1002 09/20/22  1849 09/20/22  0953   PHV 7.46* 7.36 7.36   PCO2V 72* 93* 93*   PO2V 16* 29 28   HCO3V 51* 52* 52*   LINDY -1.7 23.4* 23.2*   O2PER 21 30 99       Culture Data No results for input(s): CULT in the last 168 hours.    Virology Data:   Lab Results   Component Value Date    FLUAH1 Not Detected 09/13/2022    FLUAH3 Not Detected 09/13/2022    ME8711 Not Detected 09/13/2022    IFLUB Not Detected 09/13/2022    RSVA Not Detected 09/13/2022    RSVB Not Detected 09/13/2022    PIV1 Not Detected 09/13/2022    PIV2 Not Detected 09/13/2022    PIV3 Not Detected 09/13/2022    HMPV Not Detected 09/13/2022       Historical CMV results (last 3 of prior testing):  Lab Results   Component Value Date    CMVQNT Not Detected  09/13/2022    CMVQNT Not Detected 09/07/2022    CMVQNT Not Detected 09/01/2022     No results found for: CMVLOG    Urine Studies    Recent Labs   Lab Test 09/19/22  2254 08/01/22  0319 07/08/22  0831   URINEPH 7.0 8.0* 5.5   NITRITE Negative Negative Negative   LEUKEST Large* Negative Negative   WBCU 29*  --  1       Most Recent [place] Pulmonary Function Testing (FVC/FEV1 only)  FVC-Pre   Date Value Ref Range Status   09/07/2022 1.01 L    09/01/2022 1.07 L    08/24/2022 1.23 L    08/18/2022 1.33 L      FVC-%Pred-Pre   Date Value Ref Range Status   09/07/2022 33 %    09/01/2022 35 %    08/24/2022 40 %    08/18/2022 43 %      FEV1-Pre   Date Value Ref Range Status   09/07/2022 0.98 L    09/01/2022 1.02 L    08/24/2022 1.17 L    08/18/2022 1.22 L      FEV1-%Pred-Pre   Date Value Ref Range Status   09/07/2022 40 %    09/01/2022 42 %    08/24/2022 48 %    08/18/2022 50 %        IMAGING    Recent Results (from the past 48 hour(s))   XR Chest Port 1 View    Narrative    EXAM: XR CHEST PORT 1 VIEW  9/20/2022 10:30 AM      HISTORY: interval follow up, chest tubes, lung transplant history    COMPARISON: Chest x-ray 9/19/2022, CT 9/8/2022    FINDINGS: Portable upright AP radiograph of the chest. Postsurgical  changes of bilateral lung transplantation with clamshell sternotomy  wires. Stable positioning of bilateral chest tubes. The trachea is  midline. The cardiac silhouette is not enlarged. Atherosclerotic  calcifications of the aortic arch. Stable small left pleural effusion.  Small amount of right fissural fluid. No pneumothorax. Mild streaky  perihilar and basilar opacities are slightly decreased. The visualized  upper abdomen is unremarkable.       Impression    IMPRESSION:   1. Small left pleural effusion is stable.  2. Mild streaky perihilar and basilar opacities are decreased.    I have personally reviewed the examination and initial interpretation  and I agree with the findings.    JOHANN MORAES MD         SYSTEM ID:   [id number]   CT Head w/o Contrast    Narrative    EXAM: CT HEAD W/O CONTRAST  2022 11:32 AM     HISTORY:  altered mental status of unclear etiology       COMPARISON:  CT head 2022    TECHNIQUE: Using multidetector thin collimation helical acquisition  technique, axial, coronal and sagittal CT images from the skull base  to the vertex were obtained without intravenous contrast.   (topogram) image(s) also obtained and reviewed.    FINDINGS:  No acute intracranial hemorrhage, mass effect, or midline shift. No  acute loss of gray-white matter differentiation in the cerebral  hemispheres. Ventricles are proportionate to the cerebral sulci. Clear  basal cisterns.    The bony calvaria and the bones of the skull base are normal. The  visualized portions of the paranasal sinuses and mastoid air cells are  clear. Grossly normal orbits.       Impression    IMPRESSION: Mild motion artifact. No acute intracranial pathology.    I have personally reviewed the examination and initial interpretation  and I agree with the findings.    ANASTASIA WHITMAN MD         SYSTEM ID:  [id number]   Echo Limited   Result Value    LVEF  55-60%    Narrative    [id number]  [id number]  [id number]  [id number]^KRISTINA^MICHELLE^RAJ     [facility],[place]  Echocardiography Laboratory  [address] [place] [address]     Name: ALMAS CASIANO  MRN: [id number]  : 1960  Study Date: 2022 02:54 PM  Age: 62 yrs  Gender: Female  [place] [facility] [place]  Reason For Study: ANAYA, Murmur  Ordering Physician: MICHELLE ACEVEDO  Performed By: Makayla Dupree RDCS     BSA: 1.7 m2  Height: 63 in  Weight: 150 lb  BP: 109/54 mmHg  ______________________________________________________________________________  Procedure  Limited Echocardiogram with portions of two-dimensional, color and spectral  Doppler performed.  ______________________________________________________________________________  Interpretation Summary  Limited  TTE to evaluate dyspnea.  Global and regional left ventricular function is normal with an EF of 55-60%.  Global right ventricular function is normal. The right ventricle is normal  size.  No significant valvular abnormalities.  No pericardial effusion is present.  This study was compared with the study from 08/15/2022. No significant changes  noted.  ______________________________________________________________________________  Left Ventricle  Global and regional left ventricular function is normal with an EF of 55-60%.  Moderate concentric wall thickening consistent with left ventricular  hypertrophy is present.     Right Ventricle  Global right ventricular function is normal. The right ventricle is normal  size.     Mitral Valve  Mild mitral insufficiency is present.     Aortic Valve  The valve leaflets are not well visualized. On Doppler interrogation, there is  no significant stenosis or regurgitation.     Tricuspid Valve  The tricuspid valve is normal. Trace tricuspid insufficiency is present.  Pulmonary artery systolic pressure cannot be assessed.     Pulmonic Valve  The valve leaflets are not well visualized. Trace pulmonic insufficiency is  present.     Vessels  The inferior vena cava was dilated at 2.6 cm without respiratory variability,  consistent with increased right atrial pressure. Unable to assess mean RA  pressure given the patient is on a ventilator.     Pericardium  No pericardial effusion is present.     Compared to Previous Study  This study was compared with the study from 08/15/2022 . No significant  changes noted.     ______________________________________________________________________________  MMode/2D Measurements & Calculations  IVSd: 1.7 cm  LVIDd: 3.9 cm  LVPWd: 1.7 cm     LV mass(C)d: 267.3 grams  LV mass(C)dI: 156.2 grams/m2  RWT: 0.87     ______________________________________________________________________________  Report approved by: Mary Donald 09/20/2022 03:26 PM          CT Chest w/o Contrast    Narrative    CT chest without contrast    INDICATION: Follow-up for bilateral chest tube post bilateral pigtail  placement    COMPARISON: 9/15/2022 CT-guided imaging procedural films and  diagnostic CT 9/8/2022    FINDINGS: The included thyroid appears normal. Probable reactive  mediastinal lymph nodes are similar to 9/8/2022. Thoracic aorta is  normal in size. Heart size borderline enlarged  but there is left  atrial cardiac chamber enlargement. Multivessel coronary artery  calcium. Clamshell sternotomy from prior bilateral lung transplant. No  enlarged axillary lymph nodes. Decreased pleural effusions bilaterally  with bilateral chest tubes.  Detail of the lung parenchyma shows focal atelectatic changes  bilaterally with other septal thickening likely indicating residual  fluid. No suspicious ectopic air collection. Air noted within the  pleural effusions likely related the catheter placement.  Upper abdomen shows pneumobilia. Atelectatic changes with round  atelectasis in the left upper lobe appears slightly decreased.  Loculated component of the right pleural effusion appears similar to  9/8/2022.  Bone detail again shows a clamshell sternotomy. Mineralization of  bones is otherwise good with mild degenerative disc changes in the  thoracic spine.      Impression    IMPRESSION: Decreased pleural effusions with continued prominent  loculated component of the right effusion. Overall slight decrease in  associated atelectasis. Prior bilateral lung transplant. Bilateral  chest catheters. Pneumobilia.    ALVINA HARRY MD         SYSTEM ID:  [id number]   XR Chest 2 Views    Narrative    Chest 2 views    INDICATION: Interval follow-up.  Lung transplant.    COMPARISON: Chest CT today an plain film yesterday    FINDINGS: Bilateral chest catheters are again present. Left  costophrenic angle blunting persists. Clamshell sternotomy from prior  bilateral lung transplant again noted. No new  ectopic air collections  or suspicious opacities. Heart size normal.      Impression    IMPRESSION: Prior bilateral lung transplant with unchanged left  pleural effusion radiographically. Bibasilar chest catheters.    ALVINA HARRY MD         SYSTEM ID:  [id number]

## 2022-09-21 NOTE — PLAN OF CARE
/62 (BP Location: Left arm, Cuff Size: Adult Regular)   Pulse 92   Temp 97.6  F (36.4  C) (Axillary)   Resp 16   Wt 68.4 kg (150 lb 14.4 oz)   SpO2 100%   BMI 26.73 kg/m       Shift: [id number]    Status:  admitted 9/9/22 with persistent dyspnea (increased with activity), daily morning hemoptysis, and increased BLE edema. Work up for antibody medicated rejection versus infection  Recent BSLT (6/28/22) for COPD complicated by persistent bilateral pleural effusions, persistent CO2 retention, right hemidiaphragm palsy, left radial artery thrombosis (2/2 arterial line) s/p thrombectomy, BACILIO, C. diff colitis, gastroparesis s/p GJ tube placement (7/27/22), GI bleed 2/2 pyloric ulcer, hemobilia s/p ERCP and MRCP (August 2022)   Neuro: A&Ox4. Pt reports vivid dreams, no hallucinations. Headache from bipap  Respiratory: Continuous bipap at 30 Fi02. 1hr NC (1-2L)  break PRN. Sating high 90s. ANAYA   Cardiac:  No tele order, denies cardiac chest pain   GI/: Voids w/out difficulty, loose stools/smears throughout shift. C-diff negative  Diet: FLD. Continuous TF via J-tube @ goal of 40 ml/hr w/ 30ml WF q 4hrs. G-tube open to gravity. BS q 6h w/ s/s   Pain: Incisional pain relieved w/ PRN oxy x1 and scheduled tylenol   Incision/Drains: L & R CT site to suction  IV Access: L PIV SL   Labs: VBG PCO2 93, Bicarb 52. Team aware   Activity: SBA/Ax1 to BSC    Plan: Continue w/ POC

## 2022-09-21 NOTE — PROGRESS NOTES
[facility]    Medicine Progress Note - Hospitalist Service, GOLD TEAM 10    Date of Admission:  9/9/2022    Assessment & Plan               62 year old female with pertinent hx of end stage COPD s/p bilateral sequential lung transplant (6/22) on triple IS ( MMF/Tacro/pred), pyloric ulcer, recent ERCP c/f hemobilia, gastroparesis s/p GJ tube placement and Percutaneous J tube presents for a planned admission from transplant pulmonology to work up antibody medicated rejection versus infection.     1.  Acute metabolic encephalopathy, much improved today, with significant improvement since starting Rocephin for presumed UTI and BiPAP use last 2 nights with significant improvement in hypercarbia .  Differential diagnoses include:  1. UTI on IV Rocephin  2. Hypercarbia significnatly improved with overnight BiPAP use  3.Medication related to trazodone and gabapentin  4. Less likely to be uremic encephalopathy secondary to elevated BUN. Likely etiology of BACILIO and elevated BUN is the recent needed and appropriately aggressive diuresis that the patient required during this admission. Appreciate nephrology input. Elevated Cystatin C reflects poor renal function and possible BACILIO. This is the possible explanation for her hypermagnesemia and elevated BUN.    - Continue to hold diuresis today and closely monitor I/O  - Continue IV Rocephin  - Follow cultures  - c diff negative. Resume imodium. Diarrhea has been present for 2-3 weeks PTA  - continue to hold trazodone and gabapentin  - trend procal  - CT head no acute finding but if recurrence might need MRI brain       2.  Acute on Chronic Primary respiratory acidosis with compensatory metabolic alkalosis as well as contraction alkalosis 2/2 diuresis, not clear if present on admission   The respiratory acidosis is likely chronic in nature with normal pH, however an acute component secondary to an underlying primary lung dysfunction  including antibody mediated rejection or an active infection are also possibility.  Elevated pro sudha. The metabolic alkalosis is likely compensatory for resp acidosis as well as contraction alkalosis secondary to the highly needed diuresis that the patient received.  -BiPAP as tolerated (patient does not like BiPAP)  -Repeat venous blood gas this afternoon and in the a.m.     3.  Acute hypoxic hypercarbic respiratory failure secondary to bibasilar pleural effusion currently with chest tubes bilaterally on top of end-stage COPD s/p bilateral sequential lung transplant on 6/22/2022, all are present on admission  This is the main problem that led to this admission.  The patient has bilateral chest tubes. Chest tubes placed to water seal today.  -Continue daily chest x-ray  -Tacrolimus level DUE 9/22  -Donor specific antigen for 9/21/2022  -Azathioprine was started on 9/20/2022  -Prednisone taper to be performed on 10/13/2022  -Continue BiPAP as detailed above  -Continue valganciclovir for cytomegalovirus prophylaxis through 9/28/2022  -Continue dapsone for PJP prophylaxis  -Continue folic acid while on dapsone  -Continue nystatin.  Prophylaxis protocol post lung transplant     4.  Acute blood loss anemia secondary to pyloric ulcer on top of chronic anemia of chronic disease, all are present on admission  -Continue pantoprazole 40 mg twice daily  -Repeat endoscopy for October 2022     5.  Resolved medical problems  #Acute on chronic heart failure with preserved ejection fraction LVEF 65%, the patient was appropriately diuresed  #Steroid-induced hyperglycemia and diabetes mellitus, continue insulin Lantus with insulin NovoLog sliding scale     6.  Chronic medical problems   # extra hepatic and intrahepatic biliary dilation c/f hemobilia   # Intra ductal papillary mucinous neoplasm   ERCP 8/11 showed hemobilia.   - Monitor LFTs      #CKD stage III with superimposed BACILIO (likely 2/2 recent diuresis)  Patient has been  variable GFRs 30-50s in the last few months. Most recent Cr trend 1.5-1.9 1.56 9/8/22.   - CTM      #Severe gastroparesis s/p  GJ tube placement 7/27/2022  - RD nutrition consult placed for TF  - Percutaneous j tube in place with G venting to gravity      #HTN  # A flutter with RVR/SVT   . Has recently completed zio patch.  - Continue PTA metoprolol 50 mg BID    #Diarrhea with plan to trial transition to Myfortic. Resume imodium for now      # LLE swelling check US r/o DVT             Diet: Adult Formula Drip Feeding: Continuous Nepro with Carbsteady; Jejunostomy; Goal Rate: 40 ml/hr--okay to begin at goal once new formula arrives on unit.; mL/hr; Medication - Feeding Tube Flush Frequency: At least 15-30 mL water before and after med...  Full Liquid Diet    DVT Prophylaxis: Heparin SQ  Dong Catheter: Not present  Central Lines: None  Cardiac Monitoring: None  Code Status: Full Code      Disposition Plan    TBD     The patient's care was discussed with the Patient, Patient's Family and Pulmonary Consultant.    Roxanne Henry MD  Hospitalist Service, GOLD TEAM [address] [place] [address]  Securely message with the Vocera Web Console (learn more here)  Text page via [facility] Paging/Directory   Please see signed in provider for up to date coverage information      Clinically Significant Risk Factors Present on Admission                      ______________________________________________________________________    Interval History   Patient resting comfortably. Much more awake and alert today. Does not like the BiPAP but has been using it last 2 nights. Ongoing diarrhea. Requesting imodium. ROS neg    Data reviewed today: I reviewed all medications, new labs and imaging results over the last 24 hours.     Physical Exam   Vital Signs: Temp: 98.6  F (37  C) Temp src: Oral BP: 116/66 Pulse: 90   Resp: 14 SpO2: 100 % O2 Device: BiPAP/CPAP Oxygen Delivery: 2 LPM  Weight: 150 lbs 14.4 oz  General  Appearance: Well built, comfortable at rest, not in any acute cardio pulm distress  Respiratory: CTA b/l  Cardiovascular: S1S2 normal RRR  GI: soft NT  Skin: nad  Other: aaox3 moving all 4 ext spont following commands and interacting appropriately     Data   Recent Labs   Lab 09/21/22  1001 09/21/22  0843 09/21/22  0734 09/20/22  1012 09/20/22  0701 09/19/22  0817 09/19/22  0546 09/15/22  1609 09/15/22  1317   WBC  --   --  6.1  --  5.9  --  7.0   < >  --    HGB  --   --  7.9*  --  7.3*  --  7.8*   < >  --    MCV  --   --  105*  --  107*  --  107*   < >  --    PLT  --   --  191  --  186  --  206   < >  --    INR  --   --   --   --   --   --   --   --  0.94   NA  --   --  142  --  141  --  140   < >  --    POTASSIUM  --   --  4.1  --  4.3  --  4.5   < >  --    CHLORIDE  --   --  89*  --  87*  --  89*   < >  --    CO2  --   --  48*  --  46*  --  47*   < >  --    BUN  --   --  110.0*  --  112.0*  --  107.0*   < >  --    CR  --   --  1.63*  --  1.78*  --  1.78*   < >  --    ANIONGAP  --   --  5*  --  8  --  4*   < >  --    ESTUARDO  --   --  9.3  --  9.0  --  9.3   < >  --    * 163* 134*   < > 163*   < > 176*   < >  --    ALBUMIN  --   --  3.2*  --  3.1*  --  3.2*   < >  --    PROTTOTAL  --   --  5.6*  --  5.2*  --  5.7*   < >  --    BILITOTAL  --   --  <0.2  --  <0.2  --  <0.2   < >  --    ALKPHOS  --   --  82  --  75  --  90   < >  --    ALT  --   --  9*  --  9*  --  6*   < >  --    AST  --   --  16  --  15  --  13   < >  --     < > = values in this interval not displayed.     Recent Results (from the past 24 hour(s))   CT Head w/o Contrast    Narrative    EXAM: CT HEAD W/O CONTRAST  9/20/2022 11:32 AM     HISTORY:  altered mental status of unclear etiology       COMPARISON:  CT head 7/22/2022    TECHNIQUE: Using multidetector thin collimation helical acquisition  technique, axial, coronal and sagittal CT images from the skull base  to the vertex were obtained without intravenous contrast.   (topogram) image(s)  also obtained and reviewed.    FINDINGS:  No acute intracranial hemorrhage, mass effect, or midline shift. No  acute loss of gray-white matter differentiation in the cerebral  hemispheres. Ventricles are proportionate to the cerebral sulci. Clear  basal cisterns.    The bony calvaria and the bones of the skull base are normal. The  visualized portions of the paranasal sinuses and mastoid air cells are  clear. Grossly normal orbits.       Impression    IMPRESSION: Mild motion artifact. No acute intracranial pathology.    I have personally reviewed the examination and initial interpretation  and I agree with the findings.    ANASTASIA WHITMAN MD         SYSTEM ID:  [id number]   Echo Limited   Result Value    LVEF  55-60%    Narrative    [id number]  [id number]  [id number]  [id number]^KRISTINA^MICHELLE^RAJ     [facility],[place]  Echocardiography Laboratory  [address] [place] [id number]     Name: ALMAS CASIANO  MRN: [id number]  : 1960  Study Date: 2022 02:54 PM  Age: 62 yrs  Gender: Female  [place] [facility] [place] For Study: AANYA, Murmur  Ordering Physician: MICHELLE ACEVEDO  Performed By: Makayla Dupree RDCS     BSA: 1.7 m2  Height: 63 in  Weight: 150 lb  BP: 109/54 mmHg  ______________________________________________________________________________  Procedure  Limited Echocardiogram with portions of two-dimensional, color and spectral  Doppler performed.  ______________________________________________________________________________  Interpretation Summary  Limited TTE to evaluate dyspnea.  Global and regional left ventricular function is normal with an EF of 55-60%.  Global right ventricular function is normal. The right ventricle is normal  size.  No significant valvular abnormalities.  No pericardial effusion is present.  This study was compared with the study from 08/15/2022. No significant  changes  noted.  ______________________________________________________________________________  Left Ventricle  Global and regional left ventricular function is normal with an EF of 55-60%.  Moderate concentric wall thickening consistent with left ventricular  hypertrophy is present.     Right Ventricle  Global right ventricular function is normal. The right ventricle is normal  size.     Mitral Valve  Mild mitral insufficiency is present.     Aortic Valve  The valve leaflets are not well visualized. On Doppler interrogation, there is  no significant stenosis or regurgitation.     Tricuspid Valve  The tricuspid valve is normal. Trace tricuspid insufficiency is present.  Pulmonary artery systolic pressure cannot be assessed.     Pulmonic Valve  The valve leaflets are not well visualized. Trace pulmonic insufficiency is  present.     Vessels  The inferior vena cava was dilated at 2.6 cm without respiratory variability,  consistent with increased right atrial pressure. Unable to assess mean RA  pressure given the patient is on a ventilator.     Pericardium  No pericardial effusion is present.     Compared to Previous Study  This study was compared with the study from 08/15/2022 . No significant  changes noted.     ______________________________________________________________________________  MMode/2D Measurements & Calculations  IVSd: 1.7 cm  LVIDd: 3.9 cm  LVPWd: 1.7 cm     LV mass(C)d: 267.3 grams  LV mass(C)dI: 156.2 grams/m2  RWT: 0.87     ______________________________________________________________________________  Report approved by: Mary Donald 09/20/2022 03:26 PM         CT Chest w/o Contrast    Narrative    CT chest without contrast    INDICATION: Follow-up for bilateral chest tube post bilateral pigtail  placement    COMPARISON: 9/15/2022 CT-guided imaging procedural films and  diagnostic CT 9/8/2022    FINDINGS: The included thyroid appears normal. Probable reactive  mediastinal lymph nodes are  similar to 9/8/2022. Thoracic aorta is  normal in size. Heart size borderline enlarged  but there is left  atrial cardiac chamber enlargement. Multivessel coronary artery  calcium. Clamshell sternotomy from prior bilateral lung transplant. No  enlarged axillary lymph nodes. Decreased pleural effusions bilaterally  with bilateral chest tubes.  Detail of the lung parenchyma shows focal atelectatic changes  bilaterally with other septal thickening likely indicating residual  fluid. No suspicious ectopic air collection. Air noted within the  pleural effusions likely related the catheter placement.  Upper abdomen shows pneumobilia. Atelectatic changes with round  atelectasis in the left upper lobe appears slightly decreased.  Loculated component of the right pleural effusion appears similar to  9/8/2022.  Bone detail again shows a clamshell sternotomy. Mineralization of  bones is otherwise good with mild degenerative disc changes in the  thoracic spine.      Impression    IMPRESSION: Decreased pleural effusions with continued prominent  loculated component of the right effusion. Overall slight decrease in  associated atelectasis. Prior bilateral lung transplant. Bilateral  chest catheters. Pneumobilia.    ALVINA HARRY MD         SYSTEM ID:  [id number]     Medications     dextrose       - MEDICATION INSTRUCTIONS -       - MEDICATION INSTRUCTIONS -       - MEDICATION INSTRUCTIONS -       - MEDICATION INSTRUCTIONS -         acetaminophen  975 mg Per J Tube TID     azaTHIOprine  100 mg Per J Tube Daily     calcium carbonate 600 mg-vitamin D 400 units  1 tablet Per J Tube BID w/meals     cefTRIAXone  2 g Intravenous Q24H     cyanocobalamin  500 mcg Per Feeding Tube Daily     dapsone  50 mg Per J Tube Q Mon Wed Fri AM     folic acid  1 mg Per Feeding Tube Daily     [Held by provider] gabapentin  200 mg Per Feeding Tube TID     heparin ANTICOAGULANT  5,000 Units Subcutaneous Q12H     insulin aspart  1-6 Units Subcutaneous  Q6H     insulin glargine  6 Units Subcutaneous QAM AC     menthol  1 patch Topical QAM    And     menthol   Transdermal Q8H     menthol   Transdermal Daily     metoprolol tartrate  12.5 mg Per J Tube BID     multivitamin w/minerals  1 tablet Per Feeding Tube Daily     nystatin  1,000,000 Units Swish & Swallow 4x Daily     pantoprazole  40 mg Per J Tube BID     predniSONE  10 mg Per J Tube Daily     predniSONE  7.5 mg Per J Tube QPM     sodium chloride (PF)  3 mL Intracatheter Q8H     tacrolimus  4 mg Per J Tube QPM     tacrolimus  4.5 mg Per J Tube QAM     [Held by provider] traZODone  25 mg Per J Tube At Bedtime    Or     [Held by provider] traZODone  50 mg Per J Tube At Bedtime     valGANciclovir  450 mg Oral or Feeding Tube Once per day on Mon Wed Fri

## 2022-09-21 NOTE — PROGRESS NOTES
Brief Nephrology Note:    Patient was chart reviewed and discussed with Nephrology team. At this time, we do not feel that her hypermagnesemia and elevated urea are causing clinical symptoms. It appears that her urea was elevated prior to admission, and has a gradual rise over the past few days. Generally, encephalopathy produced by uremia is brought on by an abrupt change in urea concentration. It is not clear to us why her urea has been elevated. She does have prior history of CKD3 and continues to have changes in her creatinine during this admission. Possibly precipitated by aggressive diuresis, however it is not clear this has resulted in a clinically significant BACILIO. Causes of elevated urea would include increased metabolism and protein turnover, such as a GI bleed or steroid administration, or decreased elimination. As for hypermagnesemia, this is most often brought on by kidney failure or tubular dysfunction. Currently there is no clear evidence of this. We would prefer to monitor her urine output and trend in magnesium, creatinine, and urea to determine if there is a need for additional diagnostic workup of kidney dysfunction.     Formal nephrology note to follow tomorrow.    Madan Dave MD  Division of Renal Disease and Hypertension  [facility]  [employer] Web Console

## 2022-09-21 NOTE — CONSULTS
Nephrology Initial Consult  September 21, 2022      Sofie Rodriguez MRN:[id number] YOB: 1960  Date of Admission:9/9/2022  Primary care provider: Kaylin Triana  Requesting physician: Abbe Abebe MD    ASSESSMENT AND RECOMMENDATIONS:   Likely CKD5 (in light of recent Cystatin C)  Elevated BUN  Concern for development of uremia  It appears that creatinine may suggest overestimation of her kidney function with her limited muscle mass. It is unclear if the trend over the past week reflects an BACILIO with relative stability/improvement over the past few days. Regardless, we suspect that her kidney function is actually quite diminished (eGFR ~10) This would potentially explain her symptoms of fatigue, poor appetite and recent confusion, as well as hypermagnesemia and elevated BUN. We approached this idea with the patient and daughter today and will return tomorrow for a more detailed discussion about consideration of initiating dialysis.   -We will return tomorrow for continued conversation regarding possible HD.    Hypermagnesemia  Typically, this is related to ingestion or reduced elimination. In light of her rising magnesium, we suspect that this may be secondary to her diminished renal function. Cystatin C reflects a eGFR that is worse than expected. There have been no recent medications responsible for driving up Mg. Tacrolimus is generally associated with hypomagnesemia.   -At this level, it is not likely that her magnesium is causing clinical symptoms.     Recommendations were communicated to primary team via note    Seen and discussed with Dr. Mark Dave MD  Division of Renal Disease and Hypertension  [facility]  [email or web address]  Vocera Web Console        REASON FOR CONSULT: Hypermagnesemia, Elevated BUN    HISTORY OF PRESENT ILLNESS:  Admitting provider and nursing notes reviewed  Sofie Rodriguez is a 62 year old with PMH s/p BSLT (6/28/22) for COPD complicated by persistent bilateral  pleural effusions, persistent CO2 retention, right hemidiaphragm palsy, BACILIO (dialysis 7/14-7/16), C. diff colitis, gastroparesis s/p GJ tube placement (7/27/22), GI bleed 2/2 pyloric ulcer, hemobilia s/p ERCP and MRCP (8/22), and persistent vasoplegia.  Patient admitted 9/9/22 with persistent dyspnea. Initially treated with agrressive diuresis which led to improvement in dyspnea but slight increase in creatinine. Diuresis was stopped and she was noted to be confused. Her daughter presented to visit her and reports that she wasn't acting like her mother. Describes seeing things, loosing track of conversation, sleeping through most of the day.     Through this, her creatinine stabilized and has subtly improved in the recent days. She was additionally noted to have a rise in magnesium. Prior to admission, her BUN was elevated, and has had a slow rise correlating to her rise in creatinine. Currently, creatinine reflects a GFFR of ~30, but in light of her recent BUN elevation and hypermagnesemia, evaluated with cystatin C which reflects much poorer kidney function. It is not clear if she has had severely reduced kidney function for only the past week or if this has persisted for longer than present admission and was compounded by her hospitalization.     Her daughter today reports that she did not have specific symptoms of confusion or hallucinations or altered taste. She did have daily fatigue. These symptoms have worsened since her admission.     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     Hepatitis 2017    Hep C, [facility]     HTN (hypertension)      Osteopenia      Past Surgical History:   Procedure Laterality Date     BRONCHOSCOPY (RIGID OR FLEXIBLE), DIAGNOSTIC N/A 8/2/2022     Procedure: BRONCHOSCOPY, DIAGNOSTIC- inspection Bronch;  Surgeon: Kamala Lovell MD;  Location:  GI     BRONCHOSCOPY (RIGID OR FLEXIBLE), DIAGNOSTIC N/A 9/13/2022    Procedure: INSPECTION BRONCHOSCOPY, WITH BRONCHOALVEOLAR LAVAGE;  Surgeon: Jose R Mccullough MD;  Location:  GI     BRONCHOSCOPY FLEXIBLE AND RIGID N/A 07/19/2022    Procedure: BRONCHOSCOPY inspection only;  Surgeon: Bob Liao MD;  Location:  GI     COLONOSCOPY  2015     CV CORONARY ANGIOGRAM N/A 06/30/2021    Procedure: CV CORONARY ANGIOGRAM;  Surgeon: Alexander Cuellar MD;  Location:  HEART CARDIAC CATH LAB     CV RIGHT HEART CATH MEASUREMENTS RECORDED N/A 06/30/2021    Procedure: CV RIGHT HEART CATH;  Surgeon: Alexander Cuellar MD;  Location:  HEART CARDIAC CATH LAB     ENDOSCOPIC RETROGRADE CHOLANGIOPANCREATOGRAM N/A 8/11/2022    Procedure: ENDOSCOPIC RETROGRADE CHOLANGIOPANCREATOGRAPHY WITH PANCREATIC DUCT NEEDLE KNIFE AND STENT PLACEMENT, BILE DUCT SPHINCTEROTOMY, BLOOD/DEBRIS REMOVAL AND STENT PLACEMENT;  Surgeon: Cosmo Arroyo MD;  Location:  OR     ENT SURGERY  1974    tonsillectomy     ENTEROSCOPY SMALL BOWEL N/A 8/11/2022    Procedure: SMALL BOWEL ENTEROSCOPY;  Surgeon: Cosmo Arroyo MD;  Location:  OR     ESOPHAGOGASTRODUODENOSCOPY, WITH NASOGASTRIC TUBE INSERTION N/A 07/01/2022    Procedure: ESOPHAGOGASTRODUODENOSCOPY, WITH NASOJEJUNAL TUBE INSERTION;  Surgeon: Ozzy Nickerson MD;  Location:  GI     ESOPHAGOSCOPY, GASTROSCOPY, DUODENOSCOPY (EGD), COMBINED N/A 8/3/2022    Procedure: ESOPHAGOGASTRODUODENOSCOPY (EGD);  Surgeon: Ira Andres MD;  Location:  GI     HAND SURGERY       INSERT CHEST TUBE Right 9/13/2022    Procedure: Insert chest tube;  Surgeon: Jose R Mccullough MD;  Location:  GI     IR GASTRO JEJUNOSTOMY TUBE CHANGE  8/31/2022     IR GASTRO JEJUNOSTOMY TUBE PLACEMENT  7/27/2022     IR THORACENTESIS  8/29/2022     LEEP TX, CERVICAL  04/07/2017    HECTOR III      LYMPH NODE BIOPSY Left 2005    Left axilla, benign- [place]     MIDLINE INSERTION - DOUBLE LUMEN Left 07/28/2022    20cm, Basilic vein     THORACENTESIS Left 8/29/2022    Procedure: THORACENTESIS;  Surgeon: Bo Capone PA-C;  Location: [facility] OR     THORACENTESIS Left 9/13/2022    Procedure: Thoracentesis;  Surgeon: Jose R Mccullough MD;  Location: [facility] GI     THROMBECTOMY UPPER EXTREMITY Left 07/02/2022    Procedure: LEFT RADIAL ARM THROMBECTOMY;  Surgeon: Christie Graham MD;  Location: [facility] OR     TRANSPLANT LUNG RECIPIENT SINGLE X2 Bilateral 06/28/2022    Procedure: Clamshell Incision, Bilateral Sequential Lung Transplant, On Cardiopulmonary Bypass, Flexible Bronchoscopy;  Surgeon: Sue Sunshine MD;  Location: [facility] OR        MEDICATIONS:  PTA Meds  Prior to Admission medications    Medication Sig Last Dose Taking? Auth Provider Long Term End Date   alcohol swab prep pads Use to swab area of injection/amos as directed. Unknown at -- Yes Susan Delacruz MD     blood glucose (NO BRAND SPECIFIED) lancets standard Use to test blood sugar 4 times daily or as directed. Unknown at -- Yes Susan Delacruz MD     fludrocortisone (FLORINEF) 0.1 MG tablet 0.1 mg by Per J Tube route daily Unknown at -- Yes Unknown, Entered By History No    insulin pen needle (31G X 5 MM) 31G X 5 MM miscellaneous Use one needle daily, as directed Unknown at -- Yes Susan Delacruz MD     pantoprazole (PROTONIX) 2 mg/mL SUSP suspension 20 mLs (40 mg) by Per J Tube route 2 times daily Unknown at -- Yes Claribel Franks MD     Sharps Container MISC 1 sharps container Unknown at -- Yes Susan Delacruz MD     simethicone (MYLICON) 40 MG/0.6ML suspension Unknown sig Past Month at -- Yes Unknown, Entered By History     acetaminophen (TYLENOL) 160 MG/5ML liquid Take 31.25 mLs (1,000 mg) by mouth 2 times daily 9/9/2022 at am  Kaylin Triana PA-C     aspirin (ASA) 81 MG EC tablet Take 1 tablet (81  mg) by mouth daily 9/9/2022 at am  Claribel Franks MD     blood glucose (CONTOUR NEXT TEST) test strip Use to test blood sugar 4 times daily, as directed. 9/9/2022 at --  Susan Delacruz MD     blood glucose monitoring (CONTOUR NEXT MONITOR W/DEVICE KIT) meter device kit Use to test blood sugar 4 times daily or as directed. 9/9/2022 at --  Susan Delacruz MD     calcium carbonate 600 mg-vitamin D 400 units (CALTRATE) 600-400 MG-UNIT per tablet 1 tablet by Per J Tube route 2 times daily (with meals) 9/9/2022 at am  Claribel rFanks MD     furosemide (LASIX) 20 MG tablet Take 1 tablet (20 mg) by mouth daily 9/9/2022 at am  Kaylin Triana PA-C Yes    insulin aspart (NOVOLOG PEN) 100 UNIT/ML pen Inject 1-12 Units Subcutaneous every 4 hours  Patient taking differently: Inject 1-3 Units Subcutaneous every 4 hours 9/9/2022 at am  Susan Delacruz MD Yes    insulin glargine (LANTUS PEN) 100 UNIT/ML pen Inject 7 Units Subcutaneous 2 times daily 9/9/2022 at 1600  Susan Delacruz MD Yes    loperamide (IMODIUM A-D) 2 MG tablet Take 1 tablet (2 mg) by mouth 4 times daily as needed for diarrhea  Patient taking differently: 2 mg by Per J Tube route 4 times daily as needed for diarrhea 9/9/2022 at am  Claribel Franks MD     metoprolol tartrate (LOPRESSOR) 50 MG tablet 1 tablet (50 mg) by Per Feeding Tube route 2 times daily 9/9/2022 at am  Claribel Franks MD Yes    Multiple Vitamins-Minerals (MULTIVITAMINS W/MINERALS) liquid 15 mLs by Per J Tube route daily 9/9/2022 at am  Claribel Franks MD     mycophenolate (GENERIC EQUIVALENT) 200 MG/ML suspension 3.75 mLs (750 mg) by Per J Tube route 2 times daily 9/9/2022 at 2000  Claribel Franks MD Yes    Nutritional Supplements (GLUCOSE MANAGEMENT) TABS Take 3-4 tablets by mouth as needed (hypoglycemia) Pharmacist may change instructions to fit whatever glucose tab product they have in stock.  Patient not taking: Reported on 9/11/2022 Not  Taking at Unknown time  Kaylin Triana PA-C     nystatin (MYCOSTATIN) 052325 UNIT/ML suspension Swish and swallow 10 mLs (1,000,000 Units) in mouth 4 times daily 9/9/2022 at am  Sara Grayson NP     ondansetron (ZOFRAN ODT) 4 MG ODT tab Take 1 tablet (4 mg) by mouth every 6 hours as needed for nausea or vomiting 9/9/2022 at am  Salvador Matthew MD     oxyCODONE (ROXICODONE) 5 MG tablet 1 tablet (5 mg) by Per J Tube route every 8 hours as needed for breakthrough pain 9/9/2022 at am  Kaylin Triana PA-C     predniSONE (DELTASONE) 5 MG tablet 2 tablets (10 mg) by Per J Tube route 2 times daily Start the evening of 8/17; Taper to 10 mg twice a day starting 8/18 with next taper due 9/15. 9/9/2022 at pm  Susan Miller MD Yes    protein modular (PROSOURCE TF) LIQD 1 packet by Per Feeding Tube route daily 9/9/2022 at 1600  Claribel Franks MD     tacrolimus (GENERIC EQUIVALENT) 1 mg/mL suspension 3.5 mLs (3.5 mg) by Per J Tube route 2 times daily 9/9/2022 at pm  Claribel Franks MD Yes    valGANciclovir (VALCYTE) 50 MG/ML solution 9 mLs (450 mg) by Oral or Feeding Tube route three times a week  Patient taking differently: 450 mg by Oral or Feeding Tube route three times a week Take on Mon/Wed/Fri 9/9/2022 at am  Claribel Franks MD Yes    albuterol (PROAIR HFA/PROVENTIL HFA/VENTOLIN HFA) 108 (90 BASE) MCG/ACT Inhaler Inhale 2 puffs into the lungs every 6 hours as needed for shortness of breath / dyspnea or wheezing   Reported, Patient Yes 8/15/22   ipratropium - albuterol 0.5 mg/2.5 mg/3 mL (DUONEB) 0.5-2.5 (3) MG/3ML neb solution Inhale 1 vial into the lungs every 4 hours as needed for shortness of breath / dyspnea   Unknown, Entered By History Yes 8/15/22      Current Meds    acetaminophen  975 mg Per J Tube TID     azaTHIOprine  100 mg Per J Tube Daily     calcium carbonate 600 mg-vitamin D 400 units  1 tablet Per J Tube BID w/meals     cefTRIAXone  2 g Intravenous Q24H      cyanocobalamin  500 mcg Per Feeding Tube Daily     dapsone  50 mg Per J Tube Q Mon Wed Fri AM     folic acid  1 mg Per Feeding Tube Daily     [Held by provider] gabapentin  200 mg Per Feeding Tube TID     heparin ANTICOAGULANT  5,000 Units Subcutaneous Q12H     insulin aspart  1-6 Units Subcutaneous Q6H     insulin glargine  6 Units Subcutaneous QAM AC     menthol  1 patch Topical QAM    And     menthol   Transdermal Q8H     menthol   Transdermal Daily     metoprolol tartrate  12.5 mg Per J Tube BID     multivitamin w/minerals  1 tablet Per Feeding Tube Daily     nystatin  1,000,000 Units Swish & Swallow 4x Daily     pantoprazole  40 mg Per J Tube BID     predniSONE  10 mg Per J Tube Daily     predniSONE  7.5 mg Per J Tube QPM     sodium chloride (PF)  3 mL Intracatheter Q8H     tacrolimus  4 mg Per J Tube QPM     tacrolimus  4.5 mg Per J Tube QAM     [Held by provider] traZODone  25 mg Per J Tube At Bedtime    Or     [Held by provider] traZODone  50 mg Per J Tube At Bedtime     valGANciclovir  450 mg Oral or Feeding Tube Once per day on Mon Wed Fri     Infusion Meds    dextrose       - MEDICATION INSTRUCTIONS -       - MEDICATION INSTRUCTIONS -       - MEDICATION INSTRUCTIONS -       - MEDICATION INSTRUCTIONS -         ALLERGIES:    Allergies   Allergen Reactions     Blood Transfusion Related (Informational Only) Other (See Comments)     Patient has a history of a clinically significant antibody against RBC antigens.  A delay in compatible RBCs may occur.        REVIEW OF SYSTEMS:  A comprehensive of systems was negative except as noted above.    SOCIAL HISTORY:   Social History     Socioeconomic History     Marital status: Single     Spouse name: Not on file     Number of children: Not on file     Years of education: Not on file     Highest education level: Not on file   Occupational History     Not on file   Tobacco Use     Smoking status: Former Smoker     Years: 30.00     Types: Cigarettes     Quit date:  2020     Years since quittin.8     Smokeless tobacco: Never Used   Substance and Sexual Activity     Alcohol use: Not Currently     Drug use: Not Currently     Types: Marijuana, Methamphetamines     Comment: hx:marijuana and methamphetamine-quit both unsure ?  2-3 yrs ago     Sexual activity: Not on file   Other Topics Concern     Parent/sibling w/ CABG, MI or angioplasty before 65F 55M? Not Asked   Social History Narrative     Not on file     Social Determinants of Health     Financial Resource Strain: Not on file   Food Insecurity: Not on file   Transportation Needs: Not on file   Physical Activity: Not on file   Stress: Not on file   Social Connections: Not on file   Intimate Partner Violence: Not on file   Housing Stability: Not on file     Reviewed with patient   Her daughter accompanies Sofie Rodriguez in hospital room    FAMILY MEDICAL HISTORY:   History reviewed. No pertinent family history.  Reviewed with patient    PHYSICAL EXAM:   Temp  Av.6  F (37  C)  Min: 91.6  F (33.1  C)  Max: 100.8  F (38.2  C)  Arterial Line BP  Min:   Max: 263/71  Arterial Line MAP (mmHg)  Av.8 mmHg  Min: 22 mmHg  Max: 200 mmHg      Pulse  Av.4  Min: 60  Max: 197 Resp  Av.3  Min: 0  Max: 100  FiO2 (%)  Av.3 %  Min: 1 %  Max: 100 %  SpO2  Av.8 %  Min: 82 %  Max: 100 %       /69 (BP Location: Right arm)   Pulse 89   Temp 98.8  F (37.1  C) (Oral)   Resp 14   Wt 68.4 kg (150 lb 14.4 oz)   SpO2 98%   BMI 26.73 kg/m     Date 22 07 - 22 0659   Shift 9563-5413 8602-9434 1058-2483 24 Hour Total   INTAKE   P.O. 120   120   Enteral 240   240   Shift Total(mL/kg) 360(5.26)   360(5.26)   OUTPUT   Other 800   800   Chest Tube(mL/kg) 30(0.44)   30(0.44)   Shift Total(mL/kg) 830(12.13)   830(12.13)   Weight (kg) 68.45 68.45 68.45 68.45      Admit Weight: 73.4 kg (161 lb 12.8 oz)     GENERAL APPEARANCE: No distress, awake, lying in bed  EYES: no scleral icterus, pupils  equal  Endo: no goiter, no moon facies  EXT: 1-2+ LE edema  GI: soft, nondistended  : no montgomery  SKIN: no rash, warm, dry, no cyanosis  NEURO: face symmetric, no asterixis however intermittent twitching noted    LABS:   CMP  Recent Labs   Lab 09/21/22  1001 09/21/22  0843 09/21/22  0734 09/21/22  0437 09/20/22  1012 09/20/22  0701 09/19/22  0817 09/19/22  0546 09/18/22  1041 09/18/22  0619 09/16/22  0839 09/16/22  0606   NA  --   --  142  --   --  141  --  140  --  142   < > 140   POTASSIUM  --   --  4.1  --   --  4.3  --  4.5  --  4.8   < > 4.7   CHLORIDE  --   --  89*  --   --  87*  --  89*  --  91*   < > 92*   CO2  --   --  48*  --   --  46*  --  47*  --  43*   < > 43*   ANIONGAP  --   --  5*  --   --  8  --  4*  --  8   < > 5*   * 163* 134* 168*   < > 163*   < > 176*   < > 166*   < > 160*   BUN  --   --  110.0*  --   --  112.0*  --  107.0*  --  109.0*   < > 100.0*   CR  --   --  1.63*  --   --  1.78*  --  1.78*  --  1.86*   < > 1.83*   GFRESTIMATED  --   --  36*  --   --  32*  --  32*  --  30*   < > 31*   ESTUARDO  --   --  9.3  --   --  9.0  --  9.3  --  9.4   < > 9.5   MAG  --   --  2.9*  --   --  2.8*  --  2.7*  --   --   --  2.4*   PHOS  --   --   --   --   --  4.0  --  3.9  --   --   --  3.5   PROTTOTAL  --   --  5.6*  --   --  5.2*  --  5.7*  --   --   --  5.9*   ALBUMIN  --   --  3.2*  --   --  3.1*  --  3.2*  --   --   --  3.1*   BILITOTAL  --   --  <0.2  --   --  <0.2  --  <0.2  --   --   --  0.2   ALKPHOS  --   --  82  --   --  75  --  90  --   --   --  85   AST  --   --  16  --   --  15  --  13  --   --   --  21   ALT  --   --  9*  --   --  9*  --  6*  --   --   --  <5*    < > = values in this interval not displayed.     CBC  Recent Labs   Lab 09/21/22  0734 09/20/22  0701 09/19/22  0546 09/18/22  0619   HGB 7.9* 7.3* 7.8* 7.9*   WBC 6.1 5.9 7.0 6.9   RBC 2.51* 2.36* 2.56* 2.54*   HCT 26.4* 25.3* 27.5* 27.1*   * 107* 107* 107*   MCH 31.5 30.9 30.5 31.1   MCHC 29.9* 28.9* 28.4* 29.2*   RDW  19.3* 18.9* 19.2* 19.3*    186 206 217     INR  Recent Labs   Lab 09/15/22  1317   INR 0.94     ABG  Recent Labs   Lab 09/21/22  1002 09/20/22  1849 09/20/22  0953 09/20/22  0701 09/19/22  2358   PH  --   --   --   --  7.39   PCO2  --   --   --   --  82*   PO2  --   --   --   --  147*   HCO3  --   --   --   --  50*   O2PER 21 30 99 35 35      URINE STUDIES  Recent Labs   Lab Test 09/19/22  2254 08/01/22  0319 07/08/22  0831 07/05/22  1004 06/28/22  1309   COLOR Yellow Light Yellow Yellow Yellow Straw   APPEARANCE Slightly Cloudy* Clear Clear Slightly Cloudy* Clear   URINEGLC Negative Negative Negative Negative Negative   URINEBILI Negative Negative Negative Negative Negative   URINEKETONE Negative Negative Negative Trace* Negative   SG 1.015 1.015 1.016 1.030 1.004   UBLD Moderate* Negative Small* Small* Negative   URINEPH 7.0 8.0* 5.5 5.5 5.0   PROTEIN Negative Negative 30 * 100 * Negative   NITRITE Negative Negative Negative Negative Negative   LEUKEST Large* Negative Negative Negative Negative   RBCU 3*  --  <1 15* 0   WBCU 29*  --  1 5 1     No lab results found.  PTH  No lab results found.  IRON STUDIES  Recent Labs   Lab Test 09/03/22  1039 08/24/22  0810 07/21/22  0122   IRON 21* 41 31*   * 196* 285   IRONSAT 9* 21 11*   CARLOS 343* 334* 189       IMAGING:  Reviewed under the Chart Review section     Madan Dave MD  Division of Renal Disease and Hypertension  [facility] [place]  [email or web address]  Vocera Web Console

## 2022-09-21 NOTE — PROVIDER NOTIFICATION
DATE:  9/21/2022   TIME OF RECEIPT FROM LAB:  1810  LAB TEST:  CO2  LAB VALUE:  82  RESULTS GIVEN WITH READ-BACK TO (PROVIDER): Dr. Shrestha    TIME LAB VALUE REPORTED TO PROVIDER:   1823

## 2022-09-21 NOTE — PLAN OF CARE
VSS on 1-2L NC. 02 sats high 90s. A&Ox4. Per daughter way less confused and lethargic compared to yesterday (work Bipap continuously overnight). Morning VBG C02 level improved at 72 from 93. Reports pain at chest tube incisional site, 1 x dose of prn oxy 5 mg given along with scheduled tylenol and icy hot patch. 3 x small watery stools. Wearing brief. Up in chair x2 today and also worked with physical therapy. Chest CT and CXR performed. LLE US ordered for L>R leg edema. TF running at goal rate 40ml /hr via J, G tube to gravity.  Did not have any other food intake, did have some ice chips. Right and left chest tube remain, new order to water seal. Gold 10 primary. Will continue POC.

## 2022-09-22 ENCOUNTER — APPOINTMENT (OUTPATIENT)
Dept: GENERAL RADIOLOGY | Facility: CLINIC | Age: 62
DRG: 205 | End: 2022-09-22
Attending: PHYSICIAN ASSISTANT
Payer: MEDICARE

## 2022-09-22 ENCOUNTER — APPOINTMENT (OUTPATIENT)
Dept: PHYSICAL THERAPY | Facility: CLINIC | Age: 62
DRG: 205 | End: 2022-09-22
Attending: INTERNAL MEDICINE
Payer: MEDICARE

## 2022-09-22 ENCOUNTER — APPOINTMENT (OUTPATIENT)
Dept: ULTRASOUND IMAGING | Facility: CLINIC | Age: 62
DRG: 205 | End: 2022-09-22
Attending: INTERNAL MEDICINE
Payer: MEDICARE

## 2022-09-22 LAB
ALBUMIN SERPL BCG-MCNC: 3.1 G/DL (ref 3.5–5.2)
ALP SERPL-CCNC: 84 U/L (ref 35–104)
ALT SERPL W P-5'-P-CCNC: <5 U/L (ref 10–35)
ANION GAP SERPL CALCULATED.3IONS-SCNC: 4 MMOL/L (ref 7–15)
AST SERPL W P-5'-P-CCNC: 18 U/L (ref 10–35)
BASE EXCESS BLDV CALC-SCNC: 23.8 MMOL/L (ref -7.7–1.9)
BASOPHILS # BLD AUTO: 0 10E3/UL (ref 0–0.2)
BASOPHILS NFR BLD AUTO: 0 %
BILIRUB SERPL-MCNC: <0.2 MG/DL
BUN SERPL-MCNC: 106 MG/DL (ref 8–23)
CALCIUM SERPL-MCNC: 9.4 MG/DL (ref 8.8–10.2)
CHLORIDE SERPL-SCNC: 89 MMOL/L (ref 98–107)
CK SERPL-CCNC: 24 U/L (ref 26–192)
CREAT SERPL-MCNC: 1.63 MG/DL (ref 0.51–0.95)
CYSTATIN C (ROCHE): 4.3 MG/L (ref 0.6–1)
DEPRECATED HCO3 PLAS-SCNC: 46 MMOL/L (ref 22–29)
EOSINOPHIL # BLD AUTO: 0.1 10E3/UL (ref 0–0.7)
EOSINOPHIL NFR BLD AUTO: 1 %
ERYTHROCYTE [DISTWIDTH] IN BLOOD BY AUTOMATED COUNT: 19.4 % (ref 10–15)
GFR SERPL CREATININE-BSD FRML MDRD: 10 ML/MIN/1.73M2
GFR SERPL CREATININE-BSD FRML MDRD: 36 ML/MIN/1.73M2
GLUCOSE BLDC GLUCOMTR-MCNC: 165 MG/DL (ref 70–99)
GLUCOSE BLDC GLUCOMTR-MCNC: 174 MG/DL (ref 70–99)
GLUCOSE BLDC GLUCOMTR-MCNC: 175 MG/DL (ref 70–99)
GLUCOSE BLDC GLUCOMTR-MCNC: 175 MG/DL (ref 70–99)
GLUCOSE BLDC GLUCOMTR-MCNC: 176 MG/DL (ref 70–99)
GLUCOSE BLDC GLUCOMTR-MCNC: 184 MG/DL (ref 70–99)
GLUCOSE SERPL-MCNC: 160 MG/DL (ref 70–99)
HCO3 BLDV-SCNC: 53 MMOL/L (ref 21–28)
HCT VFR BLD AUTO: 27.8 % (ref 35–47)
HGB BLD-MCNC: 8.3 G/DL (ref 11.7–15.7)
IMM GRANULOCYTES # BLD: 0.2 10E3/UL
IMM GRANULOCYTES NFR BLD: 3 %
LYMPHOCYTES # BLD AUTO: 0.2 10E3/UL (ref 0.8–5.3)
LYMPHOCYTES NFR BLD AUTO: 3 %
MCH RBC QN AUTO: 31.6 PG (ref 26.5–33)
MCHC RBC AUTO-ENTMCNC: 29.9 G/DL (ref 31.5–36.5)
MCV RBC AUTO: 106 FL (ref 78–100)
MONOCYTES # BLD AUTO: 0.5 10E3/UL (ref 0–1.3)
MONOCYTES NFR BLD AUTO: 8 %
NEUTROPHILS # BLD AUTO: 5.8 10E3/UL (ref 1.6–8.3)
NEUTROPHILS NFR BLD AUTO: 85 %
NRBC # BLD AUTO: 0 10E3/UL
NRBC BLD AUTO-RTO: 0 /100
O2/TOTAL GAS SETTING VFR VENT: 30 %
PCO2 BLDV: 82 MM HG (ref 40–50)
PH BLDV: 7.42 [PH] (ref 7.32–7.43)
PLATELET # BLD AUTO: 217 10E3/UL (ref 150–450)
PO2 BLDV: 26 MM HG (ref 25–47)
POTASSIUM SERPL-SCNC: 4.5 MMOL/L (ref 3.4–5.3)
PROT SERPL-MCNC: 5.6 G/DL (ref 6.4–8.3)
PTH-INTACT SERPL-MCNC: 30 PG/ML (ref 15–65)
RBC # BLD AUTO: 2.63 10E6/UL (ref 3.8–5.2)
SODIUM SERPL-SCNC: 139 MMOL/L (ref 136–145)
TACROLIMUS BLD-MCNC: 9.4 UG/L (ref 5–15)
TME LAST DOSE: NORMAL H
TME LAST DOSE: NORMAL H
WBC # BLD AUTO: 6.8 10E3/UL (ref 4–11)

## 2022-09-22 PROCEDURE — 76770 US EXAM ABDO BACK WALL COMP: CPT | Mod: 26 | Performed by: STUDENT IN AN ORGANIZED HEALTH CARE EDUCATION/TRAINING PROGRAM

## 2022-09-22 PROCEDURE — 250N000011 HC RX IP 250 OP 636: Performed by: STUDENT IN AN ORGANIZED HEALTH CARE EDUCATION/TRAINING PROGRAM

## 2022-09-22 PROCEDURE — 250N000011 HC RX IP 250 OP 636: Performed by: INTERNAL MEDICINE

## 2022-09-22 PROCEDURE — 71046 X-RAY EXAM CHEST 2 VIEWS: CPT

## 2022-09-22 PROCEDURE — 250N000013 HC RX MED GY IP 250 OP 250 PS 637

## 2022-09-22 PROCEDURE — 5A09357 ASSISTANCE WITH RESPIRATORY VENTILATION, LESS THAN 24 CONSECUTIVE HOURS, CONTINUOUS POSITIVE AIRWAY PRESSURE: ICD-10-PCS | Performed by: INTERNAL MEDICINE

## 2022-09-22 PROCEDURE — 99233 SBSQ HOSP IP/OBS HIGH 50: CPT | Performed by: INTERNAL MEDICINE

## 2022-09-22 PROCEDURE — 85025 COMPLETE CBC W/AUTO DIFF WBC: CPT | Performed by: INTERNAL MEDICINE

## 2022-09-22 PROCEDURE — 82550 ASSAY OF CK (CPK): CPT | Performed by: INTERNAL MEDICINE

## 2022-09-22 PROCEDURE — 82803 BLOOD GASES ANY COMBINATION: CPT | Performed by: PHYSICIAN ASSISTANT

## 2022-09-22 PROCEDURE — 76770 US EXAM ABDO BACK WALL COMP: CPT

## 2022-09-22 PROCEDURE — 250N000013 HC RX MED GY IP 250 OP 250 PS 637: Performed by: STUDENT IN AN ORGANIZED HEALTH CARE EDUCATION/TRAINING PROGRAM

## 2022-09-22 PROCEDURE — 250N000012 HC RX MED GY IP 250 OP 636 PS 637: Performed by: NURSE PRACTITIONER

## 2022-09-22 PROCEDURE — 250N000013 HC RX MED GY IP 250 OP 250 PS 637: Performed by: NURSE PRACTITIONER

## 2022-09-22 PROCEDURE — 999N000157 HC STATISTIC RCP TIME EA 10 MIN

## 2022-09-22 PROCEDURE — 97110 THERAPEUTIC EXERCISES: CPT | Mod: GP

## 2022-09-22 PROCEDURE — 99233 SBSQ HOSP IP/OBS HIGH 50: CPT | Mod: 24 | Performed by: PHYSICIAN ASSISTANT

## 2022-09-22 PROCEDURE — 250N000012 HC RX MED GY IP 250 OP 636 PS 637: Performed by: PHYSICIAN ASSISTANT

## 2022-09-22 PROCEDURE — 214N000001 HC R&B CCU UMMC

## 2022-09-22 PROCEDURE — 82610 CYSTATIN C: CPT | Performed by: INTERNAL MEDICINE

## 2022-09-22 PROCEDURE — 99207 PR NON-BILLABLE SERV PER CHARTING: CPT | Performed by: PHYSICIAN ASSISTANT

## 2022-09-22 PROCEDURE — 80197 ASSAY OF TACROLIMUS: CPT | Performed by: NURSE PRACTITIONER

## 2022-09-22 PROCEDURE — 80053 COMPREHEN METABOLIC PANEL: CPT | Performed by: INTERNAL MEDICINE

## 2022-09-22 PROCEDURE — 71046 X-RAY EXAM CHEST 2 VIEWS: CPT | Mod: 26 | Performed by: RADIOLOGY

## 2022-09-22 PROCEDURE — 83970 ASSAY OF PARATHORMONE: CPT | Performed by: INTERNAL MEDICINE

## 2022-09-22 PROCEDURE — 36415 COLL VENOUS BLD VENIPUNCTURE: CPT | Performed by: PHYSICIAN ASSISTANT

## 2022-09-22 PROCEDURE — 99233 SBSQ HOSP IP/OBS HIGH 50: CPT | Mod: 24 | Performed by: INTERNAL MEDICINE

## 2022-09-22 PROCEDURE — 250N000013 HC RX MED GY IP 250 OP 250 PS 637: Performed by: PEDIATRICS

## 2022-09-22 PROCEDURE — 94660 CPAP INITIATION&MGMT: CPT

## 2022-09-22 RX ADMIN — CALCIUM CARBONATE 600 MG (1,500 MG)-VITAMIN D3 400 UNIT TABLET 1 TABLET: at 18:35

## 2022-09-22 RX ADMIN — CALCIUM CARBONATE 600 MG (1,500 MG)-VITAMIN D3 400 UNIT TABLET 1 TABLET: at 07:59

## 2022-09-22 RX ADMIN — Medication 40 MG: at 08:00

## 2022-09-22 RX ADMIN — INSULIN ASPART 1 UNITS: 100 INJECTION, SOLUTION INTRAVENOUS; SUBCUTANEOUS at 04:07

## 2022-09-22 RX ADMIN — ACETAMINOPHEN 975 MG: 325 TABLET, FILM COATED ORAL at 08:00

## 2022-09-22 RX ADMIN — Medication 40 MG: at 21:01

## 2022-09-22 RX ADMIN — PREDNISONE 7.5 MG: 2.5 TABLET ORAL at 20:47

## 2022-09-22 RX ADMIN — FOLIC ACID 1 MG: 1 TABLET ORAL at 07:59

## 2022-09-22 RX ADMIN — ACETAMINOPHEN 975 MG: 325 TABLET, FILM COATED ORAL at 20:47

## 2022-09-22 RX ADMIN — INSULIN ASPART 1 UNITS: 100 INJECTION, SOLUTION INTRAVENOUS; SUBCUTANEOUS at 22:15

## 2022-09-22 RX ADMIN — Medication 12.5 MG: at 20:47

## 2022-09-22 RX ADMIN — NYSTATIN 1000000 UNITS: 100000 SUSPENSION ORAL at 12:51

## 2022-09-22 RX ADMIN — OXYCODONE HYDROCHLORIDE 5 MG: 5 SOLUTION ORAL at 00:39

## 2022-09-22 RX ADMIN — NYSTATIN 1000000 UNITS: 100000 SUSPENSION ORAL at 20:47

## 2022-09-22 RX ADMIN — INSULIN ASPART 1 UNITS: 100 INJECTION, SOLUTION INTRAVENOUS; SUBCUTANEOUS at 17:37

## 2022-09-22 RX ADMIN — NYSTATIN 1000000 UNITS: 100000 SUSPENSION ORAL at 17:33

## 2022-09-22 RX ADMIN — ACETAMINOPHEN 975 MG: 325 TABLET, FILM COATED ORAL at 14:12

## 2022-09-22 RX ADMIN — OXYCODONE HYDROCHLORIDE 5 MG: 5 SOLUTION ORAL at 14:12

## 2022-09-22 RX ADMIN — OXYCODONE HYDROCHLORIDE 5 MG: 5 SOLUTION ORAL at 07:59

## 2022-09-22 RX ADMIN — INSULIN ASPART 1 UNITS: 100 INJECTION, SOLUTION INTRAVENOUS; SUBCUTANEOUS at 13:02

## 2022-09-22 RX ADMIN — NYSTATIN 1000000 UNITS: 100000 SUSPENSION ORAL at 07:59

## 2022-09-22 RX ADMIN — PREDNISONE 10 MG: 10 TABLET ORAL at 07:59

## 2022-09-22 RX ADMIN — TACROLIMUS 4 MG: 5 CAPSULE ORAL at 18:36

## 2022-09-22 RX ADMIN — Medication 12.5 MG: at 07:59

## 2022-09-22 RX ADMIN — HEPARIN SODIUM 5000 UNITS: 5000 INJECTION, SOLUTION INTRAVENOUS; SUBCUTANEOUS at 20:47

## 2022-09-22 RX ADMIN — TACROLIMUS 4.5 MG: 5 CAPSULE ORAL at 08:00

## 2022-09-22 RX ADMIN — Medication 1 TABLET: at 07:59

## 2022-09-22 RX ADMIN — Medication 100 MG: at 08:00

## 2022-09-22 RX ADMIN — CYANOCOBALAMIN TAB 500 MCG 500 MCG: 500 TAB at 07:59

## 2022-09-22 RX ADMIN — INSULIN GLARGINE 6 UNITS: 100 INJECTION, SOLUTION SUBCUTANEOUS at 08:30

## 2022-09-22 RX ADMIN — HEPARIN SODIUM 5000 UNITS: 5000 INJECTION, SOLUTION INTRAVENOUS; SUBCUTANEOUS at 08:00

## 2022-09-22 RX ADMIN — CEFTRIAXONE SODIUM 2 G: 2 INJECTION, POWDER, FOR SOLUTION INTRAMUSCULAR; INTRAVENOUS at 12:57

## 2022-09-22 RX ADMIN — OXYCODONE HYDROCHLORIDE 5 MG: 5 SOLUTION ORAL at 20:47

## 2022-09-22 ASSESSMENT — ACTIVITIES OF DAILY LIVING (ADL)
ADLS_ACUITY_SCORE: 34
ADLS_ACUITY_SCORE: 33
ADLS_ACUITY_SCORE: 34
ADLS_ACUITY_SCORE: 33
ADLS_ACUITY_SCORE: 34
ADLS_ACUITY_SCORE: 33
ADLS_ACUITY_SCORE: 34

## 2022-09-22 NOTE — PLAN OF CARE
DX: pleural effusion  PMH:   s/p BSLT (6/28/22) for COPD complicated by persistent bilateral pleural effusions, persistent CO2 retention, right hemidiaphragm palsy, BACILIO (dialysis 7/14-7/16), C. diff colitis, gastroparesis s/p GJ tube placement (7/27/22), GI bleed 2/2 pyloric ulcer, hemobilia s/p ERCP and MRCP (8/22), and persistent vasoplegia.  Other history notable for HFpEF, NTM colonoization, hepatitis C, and methamphetamine use.  Patient admitted 9/9/22 with persistent dyspnea (increased with activity), daily morning hemoptysis, and increased BLE edema.  Also noted to have persistent, increased bilateral pleural effusions, diffuse bilateral groundglass changes, and more focal appearing LLL infiltrates on imaging    Code Status: full  Team: gold 10    Cardiac: no tele orders, vss  Respiratory: diminished lung sounds, pt was on 1.5 LPM NC until BIPAP was put in at 2045. Pt stated that she will keep it on overnight to help with CO2 levels. Pt has a R and L pigtail chest tube that are both to waterseal   Neuro: AxO x4  Pain: complains of pain in back relieved with icy hot and oxy   GI/: urinating regularly and has diarrhea, but did not have a bm on shift  Diet: full liquid diet with continuous tube feeds at 40ml/ hr   Skin: generalized bruising, mild dependent edema   Activity: Ax1     Gtts/fluid: n/a     Plan: continue to follow POC

## 2022-09-22 NOTE — PLAN OF CARE
DX: pleural effusion  PMH:   s/p BSLT (6/28/22) for COPD complicated by persistent bilateral pleural effusions, persistent CO2 retention, right hemidiaphragm palsy, BACILIO (dialysis 7/14-7/16), C. diff colitis, gastroparesis s/p GJ tube placement (7/27/22), GI bleed 2/2 pyloric ulcer, hemobilia s/p ERCP and MRCP (8/22), and persistent vasoplegia.  Other history notable for HFpEF, NTM colonoization, hepatitis C, and methamphetamine use.  Patient admitted 9/9/22 with persistent dyspnea (increased with activity), daily morning hemoptysis, and increased BLE edema.  Also noted to have persistent, increased bilateral pleural effusions, diffuse bilateral groundglass changes, and more focal appearing LLL infiltrates on imaging    Code Status: full  Team: gold 10    Cardiac: no tele orders, vss  Respiratory: diminished lung sounds, pt stating in the mid to high 90s on 1.5 LPM NC. Pt had a R and L pigtail chest tube but the R was removed and only the L is remaining to water seal  Neuro: AxO x4  Pain: complains of pain in back relieved with icy hot and oxy   GI/: urinating regularly and has diarrhea, had bmx2 on shift  Diet: full liquid diet with continuous tube feeds at 40ml/ hr   Skin: generalized bruising, mild dependent edema   Activity: Ax1     Gtts/fluid: n/a     Plan: continue to follow POC

## 2022-09-22 NOTE — PHARMACY-CONSULT NOTE
Last office visit- 11/28/18    Last refills-   Lexapro- 10/9/18 #90 with no refills   lisinopril- 11/12/18 #90 with no refills    Refills sent to pharmacy, per verbal order by Dr. Vela on call   Pharmacy Consult Note    Pharmacy was consulted by [place] [id number] provider to renally dose adjust patient's medications based on recent cystatin C.    Valganciclovir is currently the only medication that is renally dose adjusted. Per [facility] renal dosing chart, recommended dosing for prophylaxis in lung transplant patients for CrCl 10-39 mL/min is 450 mg three times per week, and for CrCl <10 mL/min and iHD is 450 mg twice per week. Pharmacist spoke with Pulmonology PA this AM about considering dose reducing patient's valganciclovir from 450 mg three times per week to twice per week given cystatin C indicates patient's renal function is worse than CrCl is estimating. Based on benefits versus risks, pulmonology and pharmacy decided to keep valganciclovir at 450 mg three times a week for now as patient appears to be tolerating current dose, but will continue to monitor renal function closely and will adjust dose based on renal plan (i.e. potential plan to start HD soon).    Pharmacy will continue to monitor medications for renal dose adjustments and will adjust per policy as appropriate.    Irina Mcmahon [facility]

## 2022-09-22 NOTE — PLAN OF CARE
Goal Outcome Evaluation:    Plan of Care Reviewed With: patient     Overall Patient Progress: no change    Outcome Evaluation: Continue to infuse TF at goal rate to provide 100% of nutrition needs. Pt reports improvement in diarrhea/loose stools.

## 2022-09-22 NOTE — CONSULTS
Nephrology Progress Note  09/22/2022         Assessment & Recommendations:   Sofie Rodriguez is a 62 year old year old female with PMH s/p BSLT (6/28/22) for COPD complicated by persistent bilateral pleural effusions, persistent CO2 retention, right hemidiaphragm palsy, BACILIO (dialysis 7/14-7/16).  Patient admitted 9/9/22 with persistent dyspnea. She was noted to have hypermagnesemia and confusion which prompted evaluation of kidney function which showed significantly reduced eGFR by Cystatin C (eGFR ~10).    Suspicion for CKD5, with development of uremic symptoms  Hypermagnesemia  It appears that creatinine may suggest overestimation of her kidney function with her limited muscle mass. It is unclear if the trend over the past week reflects an BACILIO with relative stability/improvement over the past few days. Regardless, we suspect that her kidney function is actually quite diminished (eGFR ~10) This would potentially explain her symptoms of fatigue, poor appetite and recent confusion, as well as hypermagnesemia and elevated BUN.   -Multidisciplinary team (Primary, Pulmonology, Nephrology) discussed above with patient and daughter today.  -She reports improvement in her symptoms in the past few days  -Concluded that we will monitor her progression of symptoms over the weekend. If limited improvement, will consider tunneled HD line placement and a trial of HD.   -Brief discussion regarding options for ongoing dialysis. Would be a reasonable candidate for transition to PD after initial trial of HD through a tunneled CVC.   -Our team will continue to follow      Recommendations were communicated to primary team verbally and via note    Seen and discussed with Dr. Mark Dave MD  Division of Renal Disease and Hypertension  [facility]  Vocera Web Console      Interval History :   Nursing and provider notes from last 24 hours reviewed.  In the last 24 hours Sofie Rodriguez has had stable creatinine and  "reasonable UOP. She conveyed concern with starting dialysis as she just started to feel better. Discussed waiting over the weekend and consideration of initiation next week.     Physical Exam:   I/O last 3 completed shifts:  In: 920 [P.O.:120]  Out: 1200 [Urine:275; Other:800; Chest Tube:125]   /62 (BP Location: Left arm)   Pulse 78   Temp 98.7  F (37.1  C) (Oral)   Resp 16   Ht 1.588 m (5' 2.5\")   Wt 68.4 kg (150 lb 12.8 oz)   SpO2 99%   BMI 27.14 kg/m       GENERAL APPEARANCE: NAD  EYES:  No scleral icterus, pupils equal  HENT: mouth without ulcers or lesions  PULM: Breathing non-labored  CV: regular rhythm, normal rate, no rub     -edema 1+ LE   GI: soft, nondistended  INTEGUMENT: no cyanosis, no rash  NEURO:  no asterixis   Access None    Labs:   All labs reviewed by me  Electrolytes/Renal - Recent Labs   Lab Test 09/22/22  1301 09/22/22  1149 09/22/22  0551 09/21/22  0843 09/21/22  0734 09/20/22  1012 09/20/22  0701 09/19/22  0817 09/19/22  0546 09/16/22  0839 09/16/22  0606   NA  --   --  139  --  142  --  141  --  140   < > 140   POTASSIUM  --   --  4.5  --  4.1  --  4.3  --  4.5   < > 4.7   CHLORIDE  --   --  89*  --  89*  --  87*  --  89*   < > 92*   CO2  --   --  46*  --  48*  --  46*  --  47*   < > 43*   BUN  --   --  106.0*  --  110.0*  --  112.0*  --  107.0*   < > 100.0*   CR  --   --  1.63*  --  1.63*  --  1.78*  --  1.78*   < > 1.83*   *  175* 174* 160*   < > 134*   < > 163*   < > 176*   < > 160*   ESTUARDO  --   --  9.4  --  9.3  --  9.0  --  9.3   < > 9.5   MAG  --   --   --   --  2.9*  --  2.8*  --  2.7*  --  2.4*   PHOS  --   --   --   --   --   --  4.0  --  3.9  --  3.5    < > = values in this interval not displayed.       CBC -   Recent Labs   Lab Test 09/22/22  0551 09/21/22  0734 09/20/22  0701   WBC 6.8 6.1 5.9   HGB 8.3* 7.9* 7.3*    191 186       LFTs -   Recent Labs   Lab Test 09/22/22  0551 09/21/22  0734 09/20/22  0701   ALKPHOS 84 82 75   BILITOTAL <0.2 <0.2 <0.2 "   ALT <5* 9* 9*   AST 18 16 15   PROTTOTAL 5.6* 5.6* 5.2*   ALBUMIN 3.1* 3.2* 3.1*       Iron Panel -   Recent Labs   Lab Test 09/03/22  1039 08/24/22  0810 07/21/22  0122   IRON 21* 41 31*   IRONSAT 9* 21 11*   CARLOS 343* 334* 189         Imaging:  reviewed under the Chart Review section     Current Medications:    acetaminophen  975 mg Per J Tube TID     azaTHIOprine  100 mg Per J Tube Daily     calcium carbonate 600 mg-vitamin D 400 units  1 tablet Per J Tube BID w/meals     cefTRIAXone  2 g Intravenous Q24H     cyanocobalamin  500 mcg Per Feeding Tube Daily     dapsone  50 mg Per J Tube Q Mon Wed Fri AM     folic acid  1 mg Per Feeding Tube Daily     heparin ANTICOAGULANT  5,000 Units Subcutaneous Q12H     insulin aspart  1-6 Units Subcutaneous Q6H     insulin glargine  6 Units Subcutaneous QAM AC     menthol  1 patch Topical QAM    And     menthol   Transdermal Q8H     menthol   Transdermal Daily     metoprolol tartrate  12.5 mg Per J Tube BID     multivitamin w/minerals  1 tablet Per Feeding Tube Daily     nystatin  1,000,000 Units Swish & Swallow 4x Daily     pantoprazole  40 mg Per J Tube BID     predniSONE  10 mg Per J Tube Daily     predniSONE  7.5 mg Per J Tube QPM     sodium chloride (PF)  3 mL Intracatheter Q8H     tacrolimus  4 mg Per J Tube QPM     tacrolimus  4.5 mg Per J Tube QAM     [Held by provider] traZODone  25 mg Per J Tube At Bedtime    Or     [Held by provider] traZODone  50 mg Per J Tube At Bedtime     valGANciclovir  450 mg Oral or Feeding Tube Once per day on Mon Wed Fri       dextrose       - MEDICATION INSTRUCTIONS -       - MEDICATION INSTRUCTIONS -       - MEDICATION INSTRUCTIONS -       - MEDICATION INSTRUCTIONS -       Madan Dave MD  Division of Renal Disease and Hypertension  [email or web address]  [email or web address]  Vocera Web Console

## 2022-09-22 NOTE — PROGRESS NOTES
Pulmonary Medicine  Cystic Fibrosis - Lung Transplant Team  Progress Note  2022       Patient: Sofie Rodriguez  MRN: [id number]  : 1960 (age 62 year old)  Transplant: 2022 (Lung), POD#86  Admission date: 2022    Assessment & Plan:     Sofie Rodriguez is a 62-year-old female s/p BSLT (22) for COPD complicated by persistent bilateral pleural effusions, persistent CO2 retention, right hemidiaphragm palsy, BACILIO (dialysis -), C. diff colitis, gastroparesis s/p GJ tube placement (22), GI bleed 2/2 pyloric ulcer, hemobilia s/p ERCP and MRCP (), and persistent vasoplegia.  Other history notable for HFpEF, NTM colonoization, hepatitis C, and methamphetamine use.  Patient admitted 22 with persistent dyspnea (increased with activity), daily morning hemoptysis, and increased BLE edema.  Also noted to have persistent, increased bilateral pleural effusions, diffuse bilateral groundglass changes, and more focal appearing LLL infiltrates on imaging.  Treating with aggressive diuresis with some improvement in symptoms and hypoxia.  Hemoptysis resolved, mild hypoxia and ANAYA persisting.  S/p right pigtail chest tube and aspiration of left pleural effusion () with subsequent pigtail chest tube (9/15) with lytic therapy.  Output and imaging relatively stable.  Worsening mentation and tremors noted .  Also, with worsening hypercapnia, initially placed on continuous BiPAP, now using overnight.  Started ABX for possible UTI.  Mentation improved.     Today's recommendations:  - Sputum culture ordered if able to collect  - Defer high dose steroids given improving imaging  - Hold overnight BiPAP for now, repeat VBG prn with change in mentation/clinical status  - Plan to remove right chest tube  - CXR daily while chest tubes remain  - Consider metabolic cart study pending clinical course  - Tacrolimus level therapeutic, no dose adjustment, repeat level ordered   - Prednisone taper  due 10/13 (not yet ordered)  - VGCV (renally dose) for CMV ppx through 9/28, monitor CMV every other week upon completion of VGCV  - DSA (9/21) pending  - EBV ordered 10/7  - IgG ordered 9/29  - UTI ABX per primary team  - Renal US, nephrology following for consideration of HD trial, revisit 9/26  - Repeat EGD 10/13 to check healing of ulcer, sooner if hgb declines further     S/p bilateral sequential lung transplant (BSLT) for end stage COPD:  Acute hypoxia with chronic hypercapnia:   Pulmonary edema:  Persistent bibasilar pleural effusions:   Right hemidiaphragm palsy: Admitted with increased dyspnea with minimal exertion and small volume daily hemoptysis.  Also with marked decline in PFTs (FEV1 1.22L, 50% on 8/18 to 0.98L, 40% on 9/7).  Chest CT (9/8) with increased effusions and groundglass changes.  DSA positive but stable (as below).  BNP markedly elevated (30k) and also with increased BLE edema.  Etiology unclear and is likely multi-factorial with DDx including pulmonary edema from hypervolemia/progressive HFpEF, rejection (ACR +/- AMR), alveolar hemorrhage, and/or infection.  Aggressively diuresed with some improvement in symptoms.  Bronchoscopy (9/13) unremarkable, BAL of lingula sent, cultures no growth (GPC on gram stain only).  S/p right pigtail chest tube placement (9/13), transudative with moderate output initially but quickly decreasing.  S/p aspiration of left pleural effusion (9/13) and then pigtail chest tube (9/15) s/p full lytic course (916-9/19).  Chest CT (9/21) with decreased pleural effusions with continued prominent loculated component of right effusion and overall slight decrease in associated atelectasis.  Still with some ANAYA but no further hemoptysis since ~9/12.  Placed on continuous BiPAP 9/19 due to worsening hypercapnia and mentation, now improving.  BiPAP remains overnight.  - Sputum culture ordered if able to collect  - Left pleural culture (9/13, 9/15) NGTD  - Right pleural studies  (9/13) NGTD  - Defer pulmonary ABX at this time  - Defer high dose steroids given improving imaging  - Supplemental O2 to keep >92%, IS and Aerobika q1h w/a  - Hold overnight BiPAP for now, repeat VBG prn with change in mentation/clinical status  - Bilateral chest tubes to water seal (9/21), plan to remove right chest tube today  - CXR daily while chest tubes remain, awaiting today  - Consider metabolic cart study pending clinical course     Immunosuppression:  - Tacrolimus 4.5 mg qAM / 4 mg qPM (increased 9/19).  Goal level 8-12.  Level 9/22 therapeutic at 9.4 (11h), no dose adjustment, repeat level 9/26 (ordered).  - Azathioprine 100 mg daily (9/20, MMF stopped due to ongoing diarrhea)  - Prednisone 10 mg qAM / 7.5 mg qPM with next taper due 10/13 (not yet ordered), defer accelerated taper (with elevated BUN) at this time  Date AM dose (mg) PM dose (mg)   9/15/22 10 7.5   10/13/22 7.5 7.5   11/10/22 7.5 5   12/8/22 5 5   1/5/23 5 2.5      Prophylaxis:   - Dapsone qMWF for PJP ppx (resumed 9/19 monitor for worsening anemia, Bactrim stopped d/t hyperkalemia, will need to monitor for recurrence of anemia with dapsone; Pentamidine neb not tolerated on 9/7 after only 5 minutes d/t excessive coughing)  - VGCV (renally dose) for CMV ppx through 9/28, D+/R+, CMV (9/13) negative, monitor CMV every other week upon completion of VGCV     Positive DSA: Newly positive on 8/10 with DQB2 mfi 2155.  Most recent DSA on 9/14 with ~stable DQB5 from prior (5744-->5825, decreased from 7033 on 9/1).  - Repeat DSA (9/21) pending     EBV viremia: Low level (1374 on 9/7), not likely to be clinically significant.  - Follow EBV monthly (10/7, ordered)     Hypogammaglobulinemia: IgG adequate at time of transplant, repeat level low (336) on 7/28.  S/p IVIG 7/30, tolerated well.  IgG on 8/29 adequate at 672.   - Follow IgG monthly (9/29, ordered)     Other relevant problems being managed by the primary  team:     Encephalopathy:  Tremors:   Presumed UTI: Noted 9/19 with confusion as well as tremor.  DDx including hypercapnia, hyperammonia, infection, uremia (see below), medication related.  VBG with worsening hypercapnia (99).  BUN elevated (112).  Ammonia normal (24).  CRP normal, procal 0.1.  UA with moderate blood, large leukocyte esterase, 3 RBC, 29 WBC, and many bacteria.  Urine culture (9/19) with mixture of urogenital josue.  Tacrolimus levels recently subtherapeutic.  Head CT (9/20) without acute intracranial pathology.  - Blood culture (9/19) NGTD  - ABX: IV ceftriaxone (9/20) per primary team given concern for UTI  - Management per primary team    Likely CKD5 (in light of recent cystatin C): Nephrology consulted 9/21, see their note for details, with concern for CKD5 as Cr may suggest overestimation of kidney function with limited muscle mass, unclear if trend over the past week reflects BACILIO.  Cystatin C 4.3 with GFR 10.  Making urine.  Began discussion regarding consideration of HD trial with Pt. and daughter (along with nephrology and primary teams) on 9/22, Pt. preference to hold off and revisit in the next few days.  - Renal US  - Nephrology following, considering HD trial, revisit 9/26     Diarrhea: C diff negative on 9/10 and 9/20.  Likely medication related (MMF), but unable to transition to Myfortic given NPO.  Loperamide utilized with some benefit.  Goal to titrate to antidiarrheal 3 stools daily, management per primary.  Enteric stool panel negative 9/16.  Transitioned from MMF to AZA as above.  - Management per primary team     Severe gastroparesis:   Pyloric ulcer: Gastric emptying study 7/20 with severe gastric emptying delay (95% retention at 4 hours), s/p GJ tube placement in IR 7/27.  S/p EGD (8/3) noted to have pyloric ulcer and excessive gastric fluid and residual food.  S/p EGD (8/11) with mild erythema 2/2 GJ tube trauma seen near insertion site but no active bleeding.  - PPI BID  - TF  "continuous and ALL enteral medications via J tube  - G tube to gravity drainage; full liquid diet for comfort only  - Repeat EGD 10/13 to check healing of ulcer, sooner if hgb declines further    We appreciate the excellent care provided by the [place] [id number] team.  Recommendations communicated via in person rounding and this note.  Will continue to follow along closely, please do not hesitate to call with any questions or concerns.    Patient seen and discussed with Dr. Paredes.    Yuliet Aviles PA-C  Inpatient [facility]  Pulmonary CF/Transplant     Subjective & Interval History:     Feeling better this morning, still having trouble sleeping.  BiPAP 12/5 with FiO2 30% overnight, hypercapnia stable.  Remains on 1-2L NC during the day.  No change in breathing.  Cough minimally productive, unable to expectorate sputum.  Bilateral chest tubes remain, decreasing output.  Incisional site pain managed.  Still with loose stools although less frequent.    Review of Systems:     C: No fever, no chills, no recent change in weight  INTEGUMENTARY/SKIN: No rash or obvious new lesions  ENT/MOUTH: No sore throat, no sinus pain, no nasal congestion or drainage  RESP: See interval history  CV: No chest pain, no palpitations, + peripheral edema  GI: No nausea, no vomiting, no reflux symptoms  : No dysuria  MUSCULOSKELETAL: See interval history  ENDOCRINE: Blood sugars with adequate control  NEURO: + headache, no numbness or tingling  PSYCHIATRIC: Mood stable    Physical Exam:     All notes, images, and labs from past 24 hours (at minimum) were reviewed.    Vital signs:  Temp: 97.7  F (36.5  C) Temp src: Oral BP: 137/70 Pulse: 92   Resp: 16 SpO2: 100 % O2 Device: BiPAP/CPAP Oxygen Delivery: 1.5 LPM Height: 158.8 cm (5' 2.5\") Weight: 68.4 kg (150 lb 12.8 oz)  I/O:     Intake/Output Summary (Last 24 hours) at 9/22/2022 0736  Last data filed at 9/22/2022 0659  Gross per 24 hour   Intake 920 ml   Output 1200 ml   Net -280 ml "     Constitutional: Lying upright in bed, in no apparent distress.   HEENT: Eyes with pink conjunctivae, anicteric.  Oral mucosa moist with mild white plaque to tongue.    PULM: Diminished air flow to L>R base.  No crackles, no rhonchi, no wheezes.  Non-labored breathing on 1.5L NC.  Bilateral chest tubes without air leak.  CV: Normal S1 and S2.  RRR.  + systolic murmur.  No gallop or rub.  1+ left and trace right LE edema.   ABD: NABS, soft, nontender, nondistended.  PEG/J tube not visualized.  MSK: Moves all extremities.  NEURO: Alert, conversant.  + mildly tremulous.   SKIN: Warm, dry.  No rash on limited exam.   PSYCH: Mood stable.     Lines, Drains, and Devices:  Peripheral IV 09/10/22 Anterior;Left Lower forearm (Active)   Site Assessment WDL 09/22/22 0000   Line Status Saline locked 09/22/22 0000   Dressing Intervention New dressing  09/10/22 0135   Phlebitis Scale 0-->no symptoms 09/22/22 0000   Infiltration Scale 0 09/22/22 0000   Number of days: 12     Data:     LABS    CMP:   Recent Labs   Lab 09/22/22  0551 09/22/22  0405 09/21/22  2227 09/21/22  1637 09/21/22  0843 09/21/22  0734 09/20/22  1012 09/20/22  0701 09/19/22  0817 09/19/22  0546 09/16/22  0839 09/16/22  0606     --   --   --   --  142  --  141  --  140   < > 140   POTASSIUM 4.5  --   --   --   --  4.1  --  4.3  --  4.5   < > 4.7   CHLORIDE 89*  --   --   --   --  89*  --  87*  --  89*   < > 92*   CO2 46*  --   --   --   --  48*  --  46*  --  47*   < > 43*   ANIONGAP 4*  --   --   --   --  5*  --  8  --  4*   < > 5*   * 184* 139* 180*   < > 134*   < > 163*   < > 176*   < > 160*   .0*  --   --   --   --  110.0*  --  112.0*  --  107.0*   < > 100.0*   CR 1.63*  --   --   --   --  1.63*  --  1.78*  --  1.78*   < > 1.83*   GFRESTIMATED 36*  --   --   --   --  36*  --  32*  --  32*   < > 31*   ESTUARDO 9.4  --   --   --   --  9.3  --  9.0  --  9.3   < > 9.5   MAG  --   --   --   --   --  2.9*  --  2.8*  --  2.7*  --  2.4*   PHOS  --    --   --   --   --   --   --  4.0  --  3.9  --  3.5   PROTTOTAL 5.6*  --   --   --   --  5.6*  --  5.2*  --  5.7*  --  5.9*   ALBUMIN 3.1*  --   --   --   --  3.2*  --  3.1*  --  3.2*  --  3.1*   BILITOTAL <0.2  --   --   --   --  <0.2  --  <0.2  --  <0.2  --  0.2   ALKPHOS 84  --   --   --   --  82  --  75  --  90  --  85   AST 18  --   --   --   --  16  --  15  --  13  --  21   ALT <5*  --   --   --   --  9*  --  9*  --  6*  --  <5*    < > = values in this interval not displayed.     CBC:   Recent Labs   Lab 09/22/22  0551 09/21/22  0734 09/20/22  0701 09/19/22  0546   WBC 6.8 6.1 5.9 7.0   RBC 2.63* 2.51* 2.36* 2.56*   HGB 8.3* 7.9* 7.3* 7.8*   HCT 27.8* 26.4* 25.3* 27.5*   * 105* 107* 107*   MCH 31.6 31.5 30.9 30.5   MCHC 29.9* 29.9* 28.9* 28.4*   RDW 19.4* 19.3* 18.9* 19.2*    191 186 206       INR:   Recent Labs   Lab 09/15/22  1317   INR 0.94       Glucose:   Recent Labs   Lab 09/22/22  0551 09/22/22  0405 09/21/22  2227 09/21/22  1637 09/21/22  1001 09/21/22  0843   * 184* 139* 180* 162* 163*       Blood Gas:   Recent Labs   Lab 09/22/22  0551 09/21/22  1648 09/21/22  1002   PHV 7.42 7.41 7.46*   PCO2V 82* 82* 72*   PO2V 26 24* 16*   HCO3V 53* 52* 51*   LINDY 23.8* 24.2* -1.7   O2PER 30 1 21       Culture Data No results for input(s): CULT in the last 168 hours.    Virology Data:   Lab Results   Component Value Date    FLUAH1 Not Detected 09/13/2022    FLUAH3 Not Detected 09/13/2022    FY6501 Not Detected 09/13/2022    IFLUB Not Detected 09/13/2022    RSVA Not Detected 09/13/2022    RSVB Not Detected 09/13/2022    PIV1 Not Detected 09/13/2022    PIV2 Not Detected 09/13/2022    PIV3 Not Detected 09/13/2022    HMPV Not Detected 09/13/2022       Historical CMV results (last 3 of prior testing):  Lab Results   Component Value Date    CMVQNT Not Detected 09/13/2022    CMVQNT Not Detected 09/07/2022    CMVQNT Not Detected 09/01/2022     No results found for: CMVLOG    Urine Studies    Recent Labs    Lab Test 09/19/22  2254 08/01/22  0319 07/08/22  0831   URINEPH 7.0 8.0* 5.5   NITRITE Negative Negative Negative   LEUKEST Large* Negative Negative   WBCU 29*  --  1       Most Recent [place] Pulmonary Function Testing (FVC/FEV1 only)  FVC-Pre   Date Value Ref Range Status   09/07/2022 1.01 L    09/01/2022 1.07 L    08/24/2022 1.23 L    08/18/2022 1.33 L      FVC-%Pred-Pre   Date Value Ref Range Status   09/07/2022 33 %    09/01/2022 35 %    08/24/2022 40 %    08/18/2022 43 %      FEV1-Pre   Date Value Ref Range Status   09/07/2022 0.98 L    09/01/2022 1.02 L    08/24/2022 1.17 L    08/18/2022 1.22 L      FEV1-%Pred-Pre   Date Value Ref Range Status   09/07/2022 40 %    09/01/2022 42 %    08/24/2022 48 %    08/18/2022 50 %        IMAGING    Recent Results (from the past 48 hour(s))   XR Chest Port 1 View    Narrative    EXAM: XR CHEST PORT 1 VIEW  9/20/2022 10:30 AM      HISTORY: interval follow up, chest tubes, lung transplant history    COMPARISON: Chest x-ray 9/19/2022, CT 9/8/2022    FINDINGS: Portable upright AP radiograph of the chest. Postsurgical  changes of bilateral lung transplantation with clamshell sternotomy  wires. Stable positioning of bilateral chest tubes. The trachea is  midline. The cardiac silhouette is not enlarged. Atherosclerotic  calcifications of the aortic arch. Stable small left pleural effusion.  Small amount of right fissural fluid. No pneumothorax. Mild streaky  perihilar and basilar opacities are slightly decreased. The visualized  upper abdomen is unremarkable.       Impression    IMPRESSION:   1. Small left pleural effusion is stable.  2. Mild streaky perihilar and basilar opacities are decreased.    I have personally reviewed the examination and initial interpretation  and I agree with the findings.    JOHANN MORAES MD         SYSTEM ID:  [id number]   CT Head w/o Contrast    Narrative    EXAM: CT HEAD W/O CONTRAST  9/20/2022 11:32 AM     HISTORY:  altered mental status of unclear  etiology       COMPARISON:  CT head 2022    TECHNIQUE: Using multidetector thin collimation helical acquisition  technique, axial, coronal and sagittal CT images from the skull base  to the vertex were obtained without intravenous contrast.   (topogram) image(s) also obtained and reviewed.    FINDINGS:  No acute intracranial hemorrhage, mass effect, or midline shift. No  acute loss of gray-white matter differentiation in the cerebral  hemispheres. Ventricles are proportionate to the cerebral sulci. Clear  basal cisterns.    The bony calvaria and the bones of the skull base are normal. The  visualized portions of the paranasal sinuses and mastoid air cells are  clear. Grossly normal orbits.       Impression    IMPRESSION: Mild motion artifact. No acute intracranial pathology.    I have personally reviewed the examination and initial interpretation  and I agree with the findings.    ANASTASIA WHITMAN MD         SYSTEM ID:  [id number]   Echo Limited   Result Value    LVEF  55-60%    Narrative    [id number]  [id number]  [id number]  [id number]^KRISTINA^MICHELLE^RAJ     [facility],[place]  Echocardiography Laboratory  [address] [place] [id number]     Name: ALMAS CASIANO  MRN: [id number]  : 1960  Study Date: 2022 02:54 PM  Age: 62 yrs  Gender: Female  [place] [facility] [place]  Reason For Study: ANAYA, Murmur  Ordering Physician: MICHELLE ACEVEDO  Performed By: Makayla Dupree RDCS     BSA: 1.7 m2  Height: 63 in  Weight: 150 lb  BP: 109/54 mmHg  ______________________________________________________________________________  Procedure  Limited Echocardiogram with portions of two-dimensional, color and spectral  Doppler performed.  ______________________________________________________________________________  Interpretation Summary  Limited TTE to evaluate dyspnea.  Global and regional left ventricular function is normal with an EF of 55-60%.  Global right ventricular function is  normal. The right ventricle is normal  size.  No significant valvular abnormalities.  No pericardial effusion is present.  This study was compared with the study from 08/15/2022. No significant changes  noted.  ______________________________________________________________________________  Left Ventricle  Global and regional left ventricular function is normal with an EF of 55-60%.  Moderate concentric wall thickening consistent with left ventricular  hypertrophy is present.     Right Ventricle  Global right ventricular function is normal. The right ventricle is normal  size.     Mitral Valve  Mild mitral insufficiency is present.     Aortic Valve  The valve leaflets are not well visualized. On Doppler interrogation, there is  no significant stenosis or regurgitation.     Tricuspid Valve  The tricuspid valve is normal. Trace tricuspid insufficiency is present.  Pulmonary artery systolic pressure cannot be assessed.     Pulmonic Valve  The valve leaflets are not well visualized. Trace pulmonic insufficiency is  present.     Vessels  The inferior vena cava was dilated at 2.6 cm without respiratory variability,  consistent with increased right atrial pressure. Unable to assess mean RA  pressure given the patient is on a ventilator.     Pericardium  No pericardial effusion is present.     Compared to Previous Study  This study was compared with the study from 08/15/2022 . No significant  changes noted.     ______________________________________________________________________________  MMode/2D Measurements & Calculations  IVSd: 1.7 cm  LVIDd: 3.9 cm  LVPWd: 1.7 cm     LV mass(C)d: 267.3 grams  LV mass(C)dI: 156.2 grams/m2  RWT: 0.87     ______________________________________________________________________________  Report approved by: Mary Donald 09/20/2022 03:26 PM         CT Chest w/o Contrast    Narrative    CT chest without contrast    INDICATION: Follow-up for bilateral chest tube post bilateral  pigtail  placement    COMPARISON: 9/15/2022 CT-guided imaging procedural films and  diagnostic CT 9/8/2022    FINDINGS: The included thyroid appears normal. Probable reactive  mediastinal lymph nodes are similar to 9/8/2022. Thoracic aorta is  normal in size. Heart size borderline enlarged  but there is left  atrial cardiac chamber enlargement. Multivessel coronary artery  calcium. Clamshell sternotomy from prior bilateral lung transplant. No  enlarged axillary lymph nodes. Decreased pleural effusions bilaterally  with bilateral chest tubes.  Detail of the lung parenchyma shows focal atelectatic changes  bilaterally with other septal thickening likely indicating residual  fluid. No suspicious ectopic air collection. Air noted within the  pleural effusions likely related the catheter placement.  Upper abdomen shows pneumobilia. Atelectatic changes with round  atelectasis in the left upper lobe appears slightly decreased.  Loculated component of the right pleural effusion appears similar to  9/8/2022.  Bone detail again shows a clamshell sternotomy. Mineralization of  bones is otherwise good with mild degenerative disc changes in the  thoracic spine.      Impression    IMPRESSION: Decreased pleural effusions with continued prominent  loculated component of the right effusion. Overall slight decrease in  associated atelectasis. Prior bilateral lung transplant. Bilateral  chest catheters. Pneumobilia.    ALVINA HARRY MD         SYSTEM ID:  [id number]   XR Chest 2 Views    Narrative    Chest 2 views    INDICATION: Interval follow-up.  Lung transplant.    COMPARISON: Chest CT today an plain film yesterday    FINDINGS: Bilateral chest catheters are again present. Left  costophrenic angle blunting persists. Clamshell sternotomy from prior  bilateral lung transplant again noted. No new ectopic air collections  or suspicious opacities. Heart size normal.      Impression    IMPRESSION: Prior bilateral lung transplant with  unchanged left  pleural effusion radiographically. Bibasilar chest catheters.    ALVINA HARRY MD         SYSTEM ID:  [id number]   US Lower Extremity Venous Duplex Left    Narrative    EXAMINATION: DOPPLER VENOUS ULTRASOUND OF THE LEFT LOWER EXTREMITY,  9/21/2022 4:22 PM     COMPARISON: None.    HISTORY: 62-year-old female with left lower extremity swelling.    TECHNIQUE:  Gray-scale evaluation with compression, spectral flow, and  color Doppler assessment of the deep venous system of the left leg  from groin to knee, and then at the ankle.    FINDINGS:  In the left lower extremity, the common femoral, femoral, popliteal  and posterior tibial veins demonstrate normal compressibility and  blood flow.      Impression    IMPRESSION:  1.  No evidence left lower extremity deep venous thrombosis.    I have personally reviewed the examination and initial interpretation  and I agree with the findings.    KANDACE ROJAS MD         SYSTEM ID:  [id number]

## 2022-09-22 NOTE — PROGRESS NOTES
Care Coordinator  D/I: Dr Cathy Abebe called me at 3:20pm and informed me that pt will most likely need to begin hemodialysis on Monday, 9/26/22.  P: Pt will start hemodialysis and then we can start planning her discharge--pt may decide that she does not want dialysis. MD agrees with unknown discharge date.

## 2022-09-22 NOTE — PLAN OF CARE
" /70 (BP Location: Right arm, Patient Position: Supine, Cuff Size: Adult Regular)   Pulse 92   Temp 97.7  F (36.5  C) (Oral)   Resp 16   Ht 1.588 m (5' 2.5\")   Wt 68.4 kg (150 lb 12.8 oz)   SpO2 100%   BMI 27.14 kg/m      Goal Outcome Evaluation:    Plan of care reviewed with:patient    Overall patient progress:no change    Time: 4584-3101  Status:s/p 2 chest tube placement for pleural effusion by IR. H/o of bilateral lung transplant on 06/28/22 due to COPD c/b persistent bilateral pleural effusions, CO2 retention, R hemidiaphragm palsy, BACILIO, C-Diff colitis, gastroparesis s/p GJ tube placement.   Neuro/Pain:  A&Ox4, c/o chest tube site pain. Oxycodone 5 mg once this shift.   Activity: Assist of one person to bedside commode.   Cardiac/vs: No tele order, BP stable, denied chest pain, apical heart beat regular.   Respiratory: on BiPAP 30% on all night. LS diminished on lower lobes. Chest tube x 2, water seal, no air leak.   GI/: small loose bm this shift. Active bowel sound. Voided using bedside commode. G tube to gravity.   Diet: full liquid diet. Tube feeding at goal rate 40 ml/hr continuous, tolerated well.   Skin: incontinent redness on her rectum area. Barrier cream applied.   LDAs: PIV saline locked. Chest tube x 2, J tube feeding, G tube to gravity.   Labs/Imaging: BS q6hrs.   Change this shift: none   Plan: continue to monitor.           "

## 2022-09-22 NOTE — PROGRESS NOTES
[facility]    Medicine Progress Note - Hospitalist Service, GOLD TEAM 10    Date of Admission:  9/9/2022    Assessment & Plan               62 year old female with pertinent hx of end stage COPD s/p bilateral sequential lung transplant (6/22) on triple IS ( MMF/Tacro/pred), pyloric ulcer, recent ERCP c/f hemobilia, gastroparesis s/p GJ tube placement and Percutaneous J tube presents for a planned admission from transplant pulmonology to work up antibody medicated rejection versus infection.     1.  Acute metabolic encephalopathy likely secondary to urinary tract infection and uremic encephalopathy, not clear if present on admission.    The patient reported remarkable improvement in her mental status after initiation of ceftriaxone.  Is also element of contribution of possible uremic encephalopathy to the current picture.  Urine culture finalized without a specific bacterial infection.  The patient's gabapentin was started during this admission as an adjuvant treatment for pain control.    The patient was ruled out for C. difficile colitis.  CT head was unremarkable.    -Ordered renal ultrasound to ensure there is no contribution of any obstructive physiology to the patient's current kidney function  -Ordered interventional radiology consult for consideration of tunneled catheter placement for 9/26/2022  -The patient is considering initiation of dialysis while inpatient.  Dialysis was recommended based on the very low Cystatin C and the clinical picture based on recommendations of general nephrology service  -Continue ceftriaxone for total of 5 days course of antibiotic therapy  -Valganciclovir is currently adjusted to the kidney function  -I highly appreciate the input of general nephrology service     2.  Acute on Chronic Primary respiratory acidosis with compensatory metabolic alkalosis as well as contraction alkalosis 2/2 diuresis, not clear if present on admission    The respiratory acidosis is likely chronic in nature with normal pH, however an acute component secondary to an underlying primary lung dysfunction including antibody mediated rejection or an active infection are also possibility.  The patient has contraction alkalosis secondary to the highly needed diuresis that the patient received.  -BiPAP becomes somnolent on as-needed basis  -We will do venous blood gases as needed if there is a change of mental status     3.  Acute hypoxic hypercarbic respiratory failure secondary to bibasilar pleural effusion currently with chest tubes bilaterally on top of end-stage COPD s/p bilateral sequential lung transplant on 6/22/2022, all are present on admission  This is the main problem that led to this admission.  The patient has bilateral chest tubes.   -Continue daily chest x-ray  -Plan to remove right-sided chest tube  -IgG level ordered for 9/29  -Vibha-Barr virus quantitative was ordered for 10/7/2022  -Continue 3 drug immunosuppression with azathioprine, prednisone, and tacrolimus  -Prednisone taper to be performed on 10/13/2022  -Continue BiPAP as detailed above  -Continue valganciclovir for cytomegalovirus prophylaxis through 9/28/2022  -Continue dapsone for PJP prophylaxis  -Continue folic acid while on dapsone  -Continue nystatin.  Prophylaxis protocol post lung transplant  -I highly appreciate input of transplant pulmonology service     4.  Resolved medical problems  #Acute on chronic heart failure with preserved ejection fraction LVEF 65%, the patient was appropriately diuresed  #Steroid-induced hyperglycemia and diabetes mellitus, continue insulin Lantus with insulin NovoLog sliding scale  # Acute blood loss anemia secondary to pyloric ulcer on top of chronic anemia of chronic disease, all are present on admission  -Continue pantoprazole 40 mg twice daily  -Repeat endoscopy for October 2022     6.  Chronic medical problems   # extra hepatic and intrahepatic biliary  dilation c/f hemobilia   # Intra ductal papillary mucinous neoplasm   ERCP 8/11 showed hemobilia.   - Monitor LFTs      #CKD stage III with superimposed BACILIO (likely 2/2 recent diuresis)  Patient has been variable GFRs 30-50s in the last few months. Most recent Cr trend 1.5-1.9 1.56 9/8/22.   - CTM      #Severe gastroparesis s/p  GJ tube placement 7/27/2022  - RD nutrition consult placed for TF  - Percutaneous j tube in place with G venting to gravity      #HTN  # A flutter with RVR/SVT   . Has recently completed zio patch.  - Continue PTA metoprolol 50 mg BID    #Diarrhea with plan to trial transition to Myfortic. Resume imodium for now      # LLE swelling check US r/o DVT             Diet: Adult Formula Drip Feeding: Continuous Nepro with Carbsteady; Jejunostomy; Goal Rate: 40 ml/hr--okay to begin at goal once new formula arrives on unit.; mL/hr; Medication - Feeding Tube Flush Frequency: At least 15-30 mL water before and after med...  Full Liquid Diet    DVT Prophylaxis: Heparin SQ  Dong Catheter: Not present  Central Lines: None  Cardiac Monitoring: None  Code Status: Full Code      Disposition Plan    TBD     The patient's care was discussed with the Patient, Patient's Family and Pulmonary Consultant.    Abbe Abebe MD  Hospitalist Service, GOLD TEAM 10  [facility]  Securely message with the [employer] Web Console (learn more here)  Text page via [facility] Paging/Directory   Please see signed in provider for up to date coverage information      Clinically Significant Risk Factors Present on Admission                      ______________________________________________________________________    Interval History   The patient reported improvement in her mental status.  She denied any paresis or paralysis.  Four-point review of systems is otherwise negative.    Data reviewed today: I reviewed all medications, new labs and imaging results over the last 24 hours.      Physical Exam   Vital Signs: Temp: 98.5  F (36.9  C) Temp src: Oral BP: 123/72 Pulse: 92   Resp: 16 SpO2: 100 % O2 Device: BiPAP/CPAP Oxygen Delivery: 1.5 LPM  Weight: 150 lbs 12.8 oz  General Appearance: Well built, comfortable at rest, not in any acute cardio pulm distress  Respiratory: CTA b/l  Cardiovascular: S1S2 normal RRR  GI: soft NT  Skin: nad  Other: aaox3 moving all 4 ext spont following commands and interacting appropriately     Data   Recent Labs   Lab 09/22/22  1301 09/22/22  1149 09/22/22  0551 09/21/22  0843 09/21/22  0734 09/20/22  1012 09/20/22  0701   WBC  --   --  6.8  --  6.1  --  5.9   HGB  --   --  8.3*  --  7.9*  --  7.3*   MCV  --   --  106*  --  105*  --  107*   PLT  --   --  217  --  191  --  186   NA  --   --  139  --  142  --  141   POTASSIUM  --   --  4.5  --  4.1  --  4.3   CHLORIDE  --   --  89*  --  89*  --  87*   CO2  --   --  46*  --  48*  --  46*   BUN  --   --  106.0*  --  110.0*  --  112.0*   CR  --   --  1.63*  --  1.63*  --  1.78*   ANIONGAP  --   --  4*  --  5*  --  8   ESTUARDO  --   --  9.4  --  9.3  --  9.0   *  175* 174* 160*   < > 134*   < > 163*   ALBUMIN  --   --  3.1*  --  3.2*  --  3.1*   PROTTOTAL  --   --  5.6*  --  5.6*  --  5.2*   BILITOTAL  --   --  <0.2  --  <0.2  --  <0.2   ALKPHOS  --   --  84  --  82  --  75   ALT  --   --  <5*  --  9*  --  9*   AST  --   --  18  --  16  --  15    < > = values in this interval not displayed.     Recent Results (from the past 24 hour(s))   XR Chest 2 Views    Narrative    EXAM: XR CHEST 2 VIEWS  9/22/2022 11:16 AM      HISTORY: interval follow up, chest tubes, lung transplant history    COMPARISON: Chest x-ray 9/21/2022    FINDINGS: PA and lateral radiographs of the chest. Postsurgical  changes of bilateral lung transplantation with clamshell sternotomy  wires. Stable positioning of bibasilar chest tubes. The trachea is  midline. The cardiac silhouette is stable. Small left pleural  effusion. Fluid within the right  major and minor fissures. No  pneumothorax. Stable streaky perihilar and basilar opacities. The  visualized upper abdomen is unremarkable. Degenerative changes of the  spine.       Impression    IMPRESSION:   1. Small left pleural effusion.  2. Stable streaky perihilar and bibasilar opacities.  3. Postsurgical changes of bilateral lung transplantation with stable  positioning of bibasilar chest tubes.    I have personally reviewed the examination and initial interpretation  and I agree with the findings.    JOHANN MORAES MD         SYSTEM ID:  [id number]   US Renal Complete    Narrative    EXAMINATION: US RENAL COMPLETE, 9/22/2022 2:46 PM     COMPARISON: None.    HISTORY: 6 acute kidney injury.    TECHNIQUE: The kidneys and bladder were scanned in the standard  fashion with specialized ultrasound transducer(s) using both gray  scale and limited color/spectral Doppler techniques.    FINDINGS:    Right kidney: Measures 8.0 cm in length. Parenchyma is of normal  thickness and echogenicity. No focal mass. No hydronephrosis.    Left kidney: Measures 8.2 cm in length. Parenchyma is of normal  thickness and echogenicity. No focal mass. No hydronephrosis.     Bladder: Within normal limits.      Impression    IMPRESSION: No hydronephrosis     Medications     dextrose       - MEDICATION INSTRUCTIONS -       - MEDICATION INSTRUCTIONS -       - MEDICATION INSTRUCTIONS -       - MEDICATION INSTRUCTIONS -         acetaminophen  975 mg Per J Tube TID     azaTHIOprine  100 mg Per J Tube Daily     calcium carbonate 600 mg-vitamin D 400 units  1 tablet Per J Tube BID w/meals     cefTRIAXone  2 g Intravenous Q24H     cyanocobalamin  500 mcg Per Feeding Tube Daily     dapsone  50 mg Per J Tube Q Mon Wed Fri AM     folic acid  1 mg Per Feeding Tube Daily     heparin ANTICOAGULANT  5,000 Units Subcutaneous Q12H     insulin aspart  1-6 Units Subcutaneous Q6H     insulin glargine  6 Units Subcutaneous QAM AC     menthol  1 patch Topical QAM     And     menthol   Transdermal Q8H     menthol   Transdermal Daily     metoprolol tartrate  12.5 mg Per J Tube BID     multivitamin w/minerals  1 tablet Per Feeding Tube Daily     nystatin  1,000,000 Units Swish & Swallow 4x Daily     pantoprazole  40 mg Per J Tube BID     predniSONE  10 mg Per J Tube Daily     predniSONE  7.5 mg Per J Tube QPM     sodium chloride (PF)  3 mL Intracatheter Q8H     tacrolimus  4 mg Per J Tube QPM     tacrolimus  4.5 mg Per J Tube QAM     valGANciclovir  450 mg Oral or Feeding Tube Once per day on Mon Wed Fri

## 2022-09-22 NOTE — PROGRESS NOTES
CLINICAL NUTRITION SERVICES - REASSESSMENT NOTE     Nutrition Prescription    RECOMMENDATIONS FOR MDs/PROVIDERS TO ORDER:  1. Adjust free water flushes via feeding tube as needed, pending fluid status. Pt NPO and currently, free water flushes are minimal, or for patency. Monitor for volume depletion. TF formula is concentrated (881 mL H2O/day via TF formula + H2O flushes).   2. Dilute suspension medication, as able, to possibly help further decrease stooling. Continue to administer Imodium as needed.     Malnutrition Status:    Patient does not meet two of the established criteria necessary for diagnosing malnutrition but is at risk for malnutrition    Recommendations already ordered by Registered Dietitian (RD):  None additionally at this time.     Future/Additional Recommendations:  1. Diet order as per team and pending GI status.   2. Continue TF regimen. K+ 4.5 today, 9/22 (was 4.7 on 9/16, 5 on 9/15, 5.7 on 9/14). Monitor need to adjust, follow protein provisions.  3. Monitor BG control. Hgb A1c of 5.8 on 6/28/2022. BG was 174 on 9/22.   4. Monitor potential need for additional vitamin D supplementation pending lab (ordered for 9/17, lab still in process). Continue calcium/vitamin D as pt on prednisone.    5. Consider adjusting folic acid supplementation if warranted. Pt's folic acid lab was greater than 40 on 9/1.  6. Continue vitamin B12 supplementation (methylmalonic acid was 0.61 on 9/10/2022).  7. Continue thera-vit-M, as ordered, to help ensure micronutrient needs are met.      EVALUATION OF THE PROGRESS TOWARD GOALS   Diet: Full liquid diet since 9/16, for comfort. G tube to gravity. Pt states she recently had some orange juice. Per RN, had ice chips yesterday.     Nutrition Support:    - Feeding Tube (FT) access: S/p GJ tube placement on 7/27/22. J-tube for TFs. G tube to gravity.  - TF regimen:   9/10-9/14: Novasource Renal @ 35 ml/hr (840 ml) + 1 pkt Prosource provides 1720 kcal (30 kcal/kg), 87 g pro  (1.5 gm Pro/kg), 154 g CHO, 602 ml free water, and 0 g fiber daily.   9/14-current: Nepro @ goal 40 ml/hr (960 ml/day) to provide 1728 kcals (30 kcal/kg/day), 78 g PRO (1.4 g/kg/day), 701 ml free H2O, 155 g CHO and 24 g Fiber daily.   - TF modulars: None at this time.  - Feeding Tube Flushes: 30 mL Q 4 hrs  - Intake: Pt received a seven-day TF average which provided 1244 kcals and 56 g protein and does not pt's estimated kcal needs but meeting protein needs. Estimated needs are 3925-2145+ kcals/day (25-30+ kcals/kg) and 56-84 grams protein/day (1-1.5 grams of pro/kg). Nepro TF infusing at 40 mL/hr (goal) in pt's room. Less than goal TF received with TF interruptions and possibly not all TF intake recorded. Note, Novasource charted on 9/21 but assume Nepro infused.      NEW FINDINGS   General: Potential dialysis start in the coming days. Fatigue noted.   Resp: On bipap or 1.5 L O2.  GI: Pt having one to five stools daily. Last stool was on 9/22. Ordered to receive Imodium, 2 mg TID prn (last received 9/20). Per EHR, gastroparesis. C diff negative on 8/6, 9/10, and 9/20 but was positive 7/11. Per pt, no N/V or abdominal pain and reports improvement in diarrhea/loose stools.  Wt Hx: 67.4 kg (6/30/21), 62.6 kg (11/11/21), 65.7 kg (6/28/2022), 70.5 kg (8/16/2022), 73.4 kg (9/9, admit), 69.1 kg (9/16), 68.4 kg (9/22) - Pt received lasix and bumex this admission. Weight has decreased. Overall, wt is currently higher than weights 6/2021 and 11/2021.     MALNUTRITION  % Intake: Decreased intake does not meet criteria  % Weight Loss: None noted, but difficult to assess with fluid status and diuresis this admission.    Subcutaneous Fat Loss: None observed  Muscle Loss: None observed  Fluid Accumulation/Edema: Trace to mild  Malnutrition Diagnosis: Patient does not meet two of the established criteria necessary for diagnosing malnutrition but is at risk for malnutrition.    Previous Goals   Total avg nutritional intake to meet  a minimum of 25 kcal/kg and 1 g PRO/kg daily (per dosing wt 56 kg).  Evaluation: Meeting protein needs but not kcal needs.     Previous Nutrition Diagnosis  Inadequate energy intake related to less than goal TF received as evidenced by pt received a five-day TF average which provided 1247 kcals while estimated needs are 1884-4599+ kcals/day (25-30+ kcals/kg).  Evaluation: Unresolved, updated below.     CURRENT NUTRITION DIAGNOSIS  Inadequate energy intake related to less than goal TF received as evidenced by pt received a seven-day TF average which provided 1244 kcals and does not pt's estimated needs of 6573-1786+ kcals/day (25-30+ kcals/kg).     INTERVENTIONS  Implementation  None additionally at this time.     Goals  Total avg nutritional intake to meet a minimum of 25 kcal/kg and 1 g PRO/kg daily (per dosing wt 56 kg).    Monitoring/Evaluation  Progress toward goals will be monitored and evaluated per protocol.     Nutrition will continue to follow.      Kathie Bateman, MS, RD, LD, [facility]   6C Pgr: [phone]

## 2022-09-23 ENCOUNTER — APPOINTMENT (OUTPATIENT)
Dept: GENERAL RADIOLOGY | Facility: CLINIC | Age: 62
DRG: 205 | End: 2022-09-23
Attending: PHYSICIAN ASSISTANT
Payer: MEDICARE

## 2022-09-23 LAB
ALBUMIN SERPL BCG-MCNC: 3.3 G/DL (ref 3.5–5.2)
ALP SERPL-CCNC: 89 U/L (ref 35–104)
ALT SERPL W P-5'-P-CCNC: 12 U/L (ref 10–35)
ANION GAP SERPL CALCULATED.3IONS-SCNC: 3 MMOL/L (ref 7–15)
AST SERPL W P-5'-P-CCNC: 25 U/L (ref 10–35)
BACTERIA PLR CULT: NORMAL
BASOPHILS # BLD AUTO: 0 10E3/UL (ref 0–0.2)
BASOPHILS NFR BLD AUTO: 0 %
BILIRUB SERPL-MCNC: 0.2 MG/DL
BUN SERPL-MCNC: 102 MG/DL (ref 8–23)
CALCIUM SERPL-MCNC: 9.8 MG/DL (ref 8.8–10.2)
CHLORIDE SERPL-SCNC: 87 MMOL/L (ref 98–107)
CREAT SERPL-MCNC: 1.63 MG/DL (ref 0.51–0.95)
DEPRECATED HCO3 PLAS-SCNC: 50 MMOL/L (ref 22–29)
DONOR IDENTIFICATION: NORMAL
DQB2: 5390
DSA COMMENTS: NORMAL
DSA PRESENT: YES
DSA TEST METHOD: NORMAL
EOSINOPHIL # BLD AUTO: 0.1 10E3/UL (ref 0–0.7)
EOSINOPHIL NFR BLD AUTO: 1 %
ERYTHROCYTE [DISTWIDTH] IN BLOOD BY AUTOMATED COUNT: 19.3 % (ref 10–15)
GFR SERPL CREATININE-BSD FRML MDRD: 36 ML/MIN/1.73M2
GLUCOSE BLDC GLUCOMTR-MCNC: 162 MG/DL (ref 70–99)
GLUCOSE BLDC GLUCOMTR-MCNC: 177 MG/DL (ref 70–99)
GLUCOSE BLDC GLUCOMTR-MCNC: 184 MG/DL (ref 70–99)
GLUCOSE BLDC GLUCOMTR-MCNC: 236 MG/DL (ref 70–99)
GLUCOSE SERPL-MCNC: 179 MG/DL (ref 70–99)
HCT VFR BLD AUTO: 26.8 % (ref 35–47)
HGB BLD-MCNC: 8 G/DL (ref 11.7–15.7)
IMM GRANULOCYTES # BLD: 0.2 10E3/UL
IMM GRANULOCYTES NFR BLD: 4 %
LYMPHOCYTES # BLD AUTO: 0.3 10E3/UL (ref 0.8–5.3)
LYMPHOCYTES NFR BLD AUTO: 5 %
MCH RBC QN AUTO: 31.4 PG (ref 26.5–33)
MCHC RBC AUTO-ENTMCNC: 29.9 G/DL (ref 31.5–36.5)
MCV RBC AUTO: 105 FL (ref 78–100)
MONOCYTES # BLD AUTO: 0.5 10E3/UL (ref 0–1.3)
MONOCYTES NFR BLD AUTO: 7 %
NEUTROPHILS # BLD AUTO: 5.4 10E3/UL (ref 1.6–8.3)
NEUTROPHILS NFR BLD AUTO: 83 %
NRBC # BLD AUTO: 0 10E3/UL
NRBC BLD AUTO-RTO: 0 /100
ORGAN: NORMAL
PLATELET # BLD AUTO: 207 10E3/UL (ref 150–450)
POTASSIUM SERPL-SCNC: 5 MMOL/L (ref 3.4–5.3)
PROT SERPL-MCNC: 5.6 G/DL (ref 6.4–8.3)
RBC # BLD AUTO: 2.55 10E6/UL (ref 3.8–5.2)
SA 1 CELL: NORMAL
SA 1 TEST METHOD: NORMAL
SA 2 CELL: NORMAL
SA 2 TEST METHOD: NORMAL
SA1 HI RISK ABY: NORMAL
SA1 MOD RISK ABY: NORMAL
SA2 HI RISK ABY: NORMAL
SA2 MOD RISK ABY: NORMAL
SODIUM SERPL-SCNC: 140 MMOL/L (ref 136–145)
UNACCEPTABLE ANTIGENS: NORMAL
UNOS CPRA: 37
WBC # BLD AUTO: 6.4 10E3/UL (ref 4–11)
ZZZSA 1  COMMENTS: NORMAL
ZZZSA 2 COMMENTS: NORMAL

## 2022-09-23 PROCEDURE — 250N000013 HC RX MED GY IP 250 OP 250 PS 637: Performed by: PEDIATRICS

## 2022-09-23 PROCEDURE — 250N000013 HC RX MED GY IP 250 OP 250 PS 637: Performed by: NURSE PRACTITIONER

## 2022-09-23 PROCEDURE — 83735 ASSAY OF MAGNESIUM: CPT | Performed by: INTERNAL MEDICINE

## 2022-09-23 PROCEDURE — 71046 X-RAY EXAM CHEST 2 VIEWS: CPT | Mod: 26 | Performed by: RADIOLOGY

## 2022-09-23 PROCEDURE — 250N000013 HC RX MED GY IP 250 OP 250 PS 637

## 2022-09-23 PROCEDURE — 250N000011 HC RX IP 250 OP 636: Performed by: INTERNAL MEDICINE

## 2022-09-23 PROCEDURE — 250N000012 HC RX MED GY IP 250 OP 636 PS 637: Performed by: NURSE PRACTITIONER

## 2022-09-23 PROCEDURE — 71046 X-RAY EXAM CHEST 2 VIEWS: CPT

## 2022-09-23 PROCEDURE — 85025 COMPLETE CBC W/AUTO DIFF WBC: CPT | Performed by: INTERNAL MEDICINE

## 2022-09-23 PROCEDURE — 36415 COLL VENOUS BLD VENIPUNCTURE: CPT | Performed by: INTERNAL MEDICINE

## 2022-09-23 PROCEDURE — 250N000011 HC RX IP 250 OP 636: Performed by: STUDENT IN AN ORGANIZED HEALTH CARE EDUCATION/TRAINING PROGRAM

## 2022-09-23 PROCEDURE — 250N000013 HC RX MED GY IP 250 OP 250 PS 637: Performed by: STUDENT IN AN ORGANIZED HEALTH CARE EDUCATION/TRAINING PROGRAM

## 2022-09-23 PROCEDURE — 250N000012 HC RX MED GY IP 250 OP 636 PS 637: Performed by: PHYSICIAN ASSISTANT

## 2022-09-23 PROCEDURE — 80053 COMPREHEN METABOLIC PANEL: CPT | Performed by: INTERNAL MEDICINE

## 2022-09-23 PROCEDURE — 250N000013 HC RX MED GY IP 250 OP 250 PS 637: Performed by: INTERNAL MEDICINE

## 2022-09-23 PROCEDURE — 250N000009 HC RX 250: Performed by: INTERNAL MEDICINE

## 2022-09-23 PROCEDURE — 99233 SBSQ HOSP IP/OBS HIGH 50: CPT | Mod: 24 | Performed by: NURSE PRACTITIONER

## 2022-09-23 PROCEDURE — 214N000001 HC R&B CCU UMMC

## 2022-09-23 PROCEDURE — 250N000009 HC RX 250: Performed by: NURSE PRACTITIONER

## 2022-09-23 PROCEDURE — 99233 SBSQ HOSP IP/OBS HIGH 50: CPT | Performed by: INTERNAL MEDICINE

## 2022-09-23 RX ORDER — PREDNISOLONE SODIUM PHOSPHATE 15 MG/5ML
10 SOLUTION ORAL DAILY
Status: DISCONTINUED | OUTPATIENT
Start: 2022-09-24 | End: 2022-10-04

## 2022-09-23 RX ORDER — GABAPENTIN 100 MG/1
100 CAPSULE ORAL AT BEDTIME
Status: DISCONTINUED | OUTPATIENT
Start: 2022-09-23 | End: 2022-09-23

## 2022-09-23 RX ORDER — LIDOCAINE 4 G/G
2 PATCH TOPICAL
Status: DISCONTINUED | OUTPATIENT
Start: 2022-09-23 | End: 2022-10-04

## 2022-09-23 RX ORDER — GUAIFENESIN 600 MG/1
15 TABLET, EXTENDED RELEASE ORAL DAILY
Status: DISCONTINUED | OUTPATIENT
Start: 2022-09-24 | End: 2022-09-29

## 2022-09-23 RX ORDER — ACETAMINOPHEN 325 MG/10.15ML
975 LIQUID ORAL 3 TIMES DAILY
Status: DISCONTINUED | OUTPATIENT
Start: 2022-09-23 | End: 2022-09-24

## 2022-09-23 RX ORDER — PREDNISOLONE SODIUM PHOSPHATE 15 MG/5ML
7.5 SOLUTION ORAL EVERY EVENING
Status: DISCONTINUED | OUTPATIENT
Start: 2022-09-23 | End: 2022-10-04

## 2022-09-23 RX ORDER — FOLIC ACID 1 MG/1
1 TABLET ORAL DAILY
Status: DISCONTINUED | OUTPATIENT
Start: 2022-09-24 | End: 2022-10-08 | Stop reason: HOSPADM

## 2022-09-23 RX ORDER — GABAPENTIN 250 MG/5ML
100 SOLUTION ORAL AT BEDTIME
Status: DISCONTINUED | OUTPATIENT
Start: 2022-09-23 | End: 2022-09-25

## 2022-09-23 RX ADMIN — PREDNISONE 10 MG: 10 TABLET ORAL at 09:06

## 2022-09-23 RX ADMIN — MENTHOL 1 PATCH: 205.5 PATCH TOPICAL at 09:02

## 2022-09-23 RX ADMIN — NYSTATIN 1000000 UNITS: 100000 SUSPENSION ORAL at 19:37

## 2022-09-23 RX ADMIN — OXYCODONE HYDROCHLORIDE 5 MG: 5 SOLUTION ORAL at 22:46

## 2022-09-23 RX ADMIN — CYANOCOBALAMIN TAB 500 MCG 500 MCG: 500 TAB at 09:07

## 2022-09-23 RX ADMIN — HEPARIN SODIUM 5000 UNITS: 5000 INJECTION, SOLUTION INTRAVENOUS; SUBCUTANEOUS at 19:37

## 2022-09-23 RX ADMIN — PREDNISOLONE 7.5 MG: 15 SOLUTION ORAL at 19:36

## 2022-09-23 RX ADMIN — INSULIN GLARGINE 6 UNITS: 100 INJECTION, SOLUTION SUBCUTANEOUS at 09:09

## 2022-09-23 RX ADMIN — DAPSONE 50 MG: 100 TABLET ORAL at 09:02

## 2022-09-23 RX ADMIN — OXYCODONE HYDROCHLORIDE 5 MG: 5 SOLUTION ORAL at 18:06

## 2022-09-23 RX ADMIN — NYSTATIN 1000000 UNITS: 100000 SUSPENSION ORAL at 11:18

## 2022-09-23 RX ADMIN — OXYCODONE HYDROCHLORIDE 5 MG: 5 SOLUTION ORAL at 09:58

## 2022-09-23 RX ADMIN — INSULIN ASPART 1 UNITS: 100 INJECTION, SOLUTION INTRAVENOUS; SUBCUTANEOUS at 09:43

## 2022-09-23 RX ADMIN — ACETAMINOPHEN 975 MG: 325 SOLUTION ORAL at 19:35

## 2022-09-23 RX ADMIN — TACROLIMUS 4.5 MG: 5 CAPSULE ORAL at 09:04

## 2022-09-23 RX ADMIN — Medication 1 TABLET: at 09:06

## 2022-09-23 RX ADMIN — INSULIN ASPART 1 UNITS: 100 INJECTION, SOLUTION INTRAVENOUS; SUBCUTANEOUS at 21:48

## 2022-09-23 RX ADMIN — CALCIUM CARBONATE 600 MG (1,500 MG)-VITAMIN D3 400 UNIT TABLET 1 TABLET: at 09:07

## 2022-09-23 RX ADMIN — Medication 40 MG: at 09:02

## 2022-09-23 RX ADMIN — VALGANCICLOVIR HYDROCHLORIDE 450 MG: 50 POWDER, FOR SOLUTION ORAL at 09:03

## 2022-09-23 RX ADMIN — ACETAMINOPHEN 975 MG: 325 TABLET, FILM COATED ORAL at 09:06

## 2022-09-23 RX ADMIN — INSULIN ASPART 2 UNITS: 100 INJECTION, SOLUTION INTRAVENOUS; SUBCUTANEOUS at 17:00

## 2022-09-23 RX ADMIN — Medication 40 MG: at 19:36

## 2022-09-23 RX ADMIN — INSULIN ASPART 1 UNITS: 100 INJECTION, SOLUTION INTRAVENOUS; SUBCUTANEOUS at 05:12

## 2022-09-23 RX ADMIN — HEPARIN SODIUM 5000 UNITS: 5000 INJECTION, SOLUTION INTRAVENOUS; SUBCUTANEOUS at 09:07

## 2022-09-23 RX ADMIN — Medication 12.5 MG: at 09:06

## 2022-09-23 RX ADMIN — OXYCODONE HYDROCHLORIDE 5 MG: 5 SOLUTION ORAL at 14:21

## 2022-09-23 RX ADMIN — TACROLIMUS 4 MG: 5 CAPSULE ORAL at 17:34

## 2022-09-23 RX ADMIN — ACETAMINOPHEN 975 MG: 325 SOLUTION ORAL at 14:21

## 2022-09-23 RX ADMIN — NYSTATIN 1000000 UNITS: 100000 SUSPENSION ORAL at 15:48

## 2022-09-23 RX ADMIN — Medication 100 MG: at 09:04

## 2022-09-23 RX ADMIN — GABAPENTIN 100 MG: 250 SOLUTION ORAL at 22:46

## 2022-09-23 RX ADMIN — OXYCODONE HYDROCHLORIDE 5 MG: 5 SOLUTION ORAL at 05:00

## 2022-09-23 RX ADMIN — CEFTRIAXONE SODIUM 2 G: 2 INJECTION, POWDER, FOR SOLUTION INTRAMUSCULAR; INTRAVENOUS at 11:18

## 2022-09-23 RX ADMIN — NYSTATIN 1000000 UNITS: 100000 SUSPENSION ORAL at 09:02

## 2022-09-23 RX ADMIN — ORAL VEHICLES - SUSP 12.5 MG: SUSPENSION at 19:36

## 2022-09-23 RX ADMIN — FOLIC ACID 1 MG: 1 TABLET ORAL at 09:07

## 2022-09-23 RX ADMIN — MENTHOL 1 PATCH: 205.5 PATCH TOPICAL at 19:49

## 2022-09-23 RX ADMIN — CALCIUM CARBONATE 600 MG (1,500 MG)-VITAMIN D3 400 UNIT TABLET 1 TABLET: at 17:34

## 2022-09-23 ASSESSMENT — ACTIVITIES OF DAILY LIVING (ADL)
ADLS_ACUITY_SCORE: 35
ADLS_ACUITY_SCORE: 35
ADLS_ACUITY_SCORE: 32
ADLS_ACUITY_SCORE: 34
ADLS_ACUITY_SCORE: 33
ADLS_ACUITY_SCORE: 33
ADLS_ACUITY_SCORE: 34
ADLS_ACUITY_SCORE: 32
ADLS_ACUITY_SCORE: 35
ADLS_ACUITY_SCORE: 33
ADLS_ACUITY_SCORE: 33
ADLS_ACUITY_SCORE: 32

## 2022-09-23 NOTE — PROGRESS NOTES
[facility]    Medicine Progress Note - Hospitalist Service, GOLD TEAM 10    Date of Admission:  9/9/2022    Assessment & Plan        62 year old female with pertinent hx of end stage COPD s/p bilateral sequential lung transplant (6/22) on triple IS ( MMF/Tacro/pred), pyloric ulcer, recent ERCP c/f hemobilia, gastroparesis s/p GJ tube placement and Percutaneous J tube presents for a planned admission from transplant pulmonology to work up antibody medicated rejection versus infection.     1.  Acute kidney injury AKIN stage II on CKD stage IIIb, all are present on admission  We will continue to monitor kidney function daily with strict input and output and daily body weight.  The patient has been ruled out for any obstructive renal pathology with a renal ultrasound dated 9/22/2022.Valganciclovir is currently adjusted to the kidney function  If there is no improvement in the patient kidney function plan for tunneled catheter placement by interventional radiology on 9/26/2022 with consideration for dialysis on that same day.    2.  Significant delayed gastric emptying s/p gastrojejunostomy tube placement, all are present on admission  We will continue to utilize jejunal tube for nutrition.  I ordered a follow-up nuclear medicine gastric emptying study for evaluation of any hopefully improvement in the patient's gastric emptying.  This can also be performed as outpatient once the patient is ready for discharge.  However, I am optimistic that this can happen on 9/26/2022.     3.  Acute hypoxic hypercarbic respiratory failure secondary to bibasilar pleural effusion currently with chest tubes bilaterally on top of end-stage COPD s/p bilateral sequential lung transplant on 6/22/2022 complicated by chronic respiratory acidosis with compensation by metabolic alkalosis, all are present on admission   This is the main problem that led to this admission.  The patient has bilateral  chest tubes.  The patient had her right-sided chest tube removed on 9/22/2022.  -Continue daily chest x-ray  -DSA testing on 10/5/2022  -IgG level ordered for 9/29  -Vibha-Barr virus quantitative was ordered for 10/7/2022  -Continue 3 drug immunosuppression with azathioprine, prednisone, and tacrolimus  -Prednisone taper to be performed on 10/13/2022  -Continue BiPAP as detailed above  -Continue valganciclovir for cytomegalovirus prophylaxis through 9/28/2022  -Continue dapsone for PJP prophylaxis  -Continue folic acid while on dapsone  -Continue nystatin.  Prophylaxis protocol post lung transplant  -I highly appreciate input of transplant pulmonology service     4.  Resolved medical problems  #Acute on chronic heart failure with preserved ejection fraction LVEF 65%, the patient was appropriately diuresed  #Steroid-induced hyperglycemia and diabetes mellitus, continue insulin Lantus with insulin NovoLog sliding scale  # Acute blood loss anemia secondary to pyloric ulcer on top of chronic anemia of chronic disease, all are present on admission  -Continue pantoprazole 40 mg twice daily  -Repeat endoscopy for October 2022  #Acute metabolic encephalopathy secondary to urinary tract infection, not clear if present on admission, ordered a total of 5-day course of ceftriaxone for which the patient encephalopathy resolved     5.  Chronic medical problems   # extra hepatic and intrahepatic biliary dilation c/f hemobilia   # Intra ductal papillary mucinous neoplasm   ERCP 8/11 showed hemobilia.   - Monitor LFTs      #CKD stage III with superimposed BACILIO (likely 2/2 recent diuresis)  Patient has been variable GFRs 30-50s in the last few months. Most recent Cr trend 1.5-1.9 1.56 9/8/22.   - CTM      #Severe gastroparesis s/p  GJ tube placement 7/27/2022  - RD nutrition consult placed for TF  - Percutaneous j tube in place with G venting to gravity      #HTN  # A flutter with RVR/SVT   . Has recently completed zio patch.  -  Continue PTA metoprolol 50 mg BID    #Diarrhea with plan to trial transition to Myfortic. Resume imodium for now      # LLE swelling check US r/o DVT             Diet: Adult Formula Drip Feeding: Continuous Nepro with Carbsteady; Jejunostomy; Goal Rate: 40 ml/hr--okay to begin at goal once new formula arrives on unit.; mL/hr; Medication - Feeding Tube Flush Frequency: At least 15-30 mL water before and after med...  Full Liquid Diet  NPO per Anesthesia Guidelines for Procedure/Surgery Except for: Meds  Snacks/Supplements Adult: Nepro Oral Supplement; Between Meals    DVT Prophylaxis: Heparin SQ  Dong Catheter: Not present  Central Lines: None  Cardiac Monitoring: None  Code Status: Full Code      Disposition Plan    TBD     The patient's care was discussed with the Patient, Patient's Family and Pulmonary Consultant.    Abbe Abebe MD  Hospitalist Service, GOLD TEAM 10  [facility]  Securely message with the Vocera Web Console (learn more here)  Text page via [facility] Paging/Directory   Please see signed in provider for up to date coverage information      Clinically Significant Risk Factors Present on Admission                      ______________________________________________________________________    Interval History   The patient continues to report improvement in her mental status.  She denied any paresis or paralysis.  Four-point review of systems is otherwise negative.    Data reviewed today: I reviewed all medications, new labs and imaging results over the last 24 hours.     Physical Exam   Vital Signs: Temp: 98.5  F (36.9  C) Temp src: Oral BP: 131/70 Pulse: 100   Resp: 18 SpO2: 97 % O2 Device: Nasal cannula Oxygen Delivery: 2 LPM  Weight: 152 lbs 1.6 oz  General Appearance: Well built, comfortable at rest, not in any acute cardio pulm distress  Respiratory: CTA b/l  Cardiovascular: S1S2 normal RRR  GI: soft NT  Skin: nad  Other: aaox3 moving all 4 ext spont  following commands and interacting appropriately     Data   Recent Labs   Lab 09/23/22  0943 09/23/22  0536 09/23/22  0511 09/22/22  1149 09/22/22  0551 09/21/22  0843 09/21/22  0734   WBC  --  6.4  --   --  6.8  --  6.1   HGB  --  8.0*  --   --  8.3*  --  7.9*   MCV  --  105*  --   --  106*  --  105*   PLT  --  207  --   --  217  --  191   NA  --  140  --   --  139  --  142   POTASSIUM  --  5.0  --   --  4.5  --  4.1   CHLORIDE  --  87*  --   --  89*  --  89*   CO2  --  50*  --   --  46*  --  48*   BUN  --  102.0*  --   --  106.0*  --  110.0*   CR  --  1.63*  --   --  1.63*  --  1.63*   ANIONGAP  --  3*  --   --  4*  --  5*   ESTUARDO  --  9.8  --   --  9.4  --  9.3   * 179* 184*   < > 160*   < > 134*   ALBUMIN  --  3.3*  --   --  3.1*  --  3.2*   PROTTOTAL  --  5.6*  --   --  5.6*  --  5.6*   BILITOTAL  --  0.2  --   --  <0.2  --  <0.2   ALKPHOS  --  89  --   --  84  --  82   ALT  --  12  --   --  <5*  --  9*   AST  --  25  --   --  18  --  16    < > = values in this interval not displayed.     Recent Results (from the past 24 hour(s))   XR Chest 2 Views    Narrative    EXAM: XR CHEST 2 VIEWS  9/23/2022 9:01 AM      HISTORY: interval follow up, chest tubes, lung transplant history    COMPARISON: Chest x-ray 9/22/2022    FINDINGS: PA and lateral radiographs of the chest. Postsurgical  changes of bilateral lung transplantation with clamshell sternotomy  wires. Stable left basilar chest tube. Interval removal of right  basilar chest tube. The trachea is midline. The cardiac silhouette is  not enlarged. Small left and likely trace right pleural effusions. No  pneumothorax. Atherosclerotic calcifications of the aortic arch.  Stable perihilar and basilar opacities. Partially visualized  percutaneous gastrostomy tube.       Impression    IMPRESSION:   1. Small left and trace right pleural effusions.  2. Interval removal of right basilar chest tube. No pneumothorax.  3. Stable perihilar and basilar opacities.    I have  personally reviewed the examination and initial interpretation  and I agree with the findings.    JOHANN MORAES MD         SYSTEM ID:  [id number]     Medications     dextrose       - MEDICATION INSTRUCTIONS -       - MEDICATION INSTRUCTIONS -         acetaminophen  975 mg Oral or Feeding Tube TID     azaTHIOprine  100 mg Per J Tube Daily     calcium carbonate 600 mg-vitamin D 400 units  1 tablet Per J Tube BID w/meals     cefTRIAXone  2 g Intravenous Q24H     cyanocobalamin  500 mcg Per Feeding Tube Daily     dapsone  50 mg Per J Tube Q Mon Wed Fri AM     [START ON 9/24/2022] folic acid  1 mg Oral or Feeding Tube Daily     gabapentin  100 mg Oral At Bedtime     heparin ANTICOAGULANT  5,000 Units Subcutaneous Q12H     insulin aspart  1-6 Units Subcutaneous Q6H     insulin glargine  6 Units Subcutaneous QAM AC     lidocaine  2 patch Transdermal Q24h    And     lidocaine   Transdermal Q8H TONY     menthol  1 patch Topical QAM    And     menthol   Transdermal Q8H     menthol   Transdermal Daily     metoprolol  12.5 mg Per J Tube BID     [START ON 9/24/2022] multivitamins w/minerals  15 mL Per Feeding Tube Daily     nystatin  1,000,000 Units Swish & Swallow 4x Daily     pantoprazole  40 mg Per J Tube BID     [START ON 9/24/2022] prednisoLONE  10 mg Per J Tube Daily     prednisoLONE  7.5 mg Per J Tube QPM     sodium chloride (PF)  3 mL Intracatheter Q8H     tacrolimus  4 mg Per J Tube QPM     tacrolimus  4.5 mg Per J Tube QAM     valGANciclovir  450 mg Oral or Feeding Tube Once per day on Mon Wed Fri

## 2022-09-23 NOTE — CONSULTS
"    Interventional Radiology Consult Service Note    Patient is on IR schedule 9/26 for a TCVC placement for HD.   Labs WNL for procedure. COVID neg.    Orders for NPO and antibiotics have been entered.  Consent will be done prior to procedure.     [place] the [place] [phone] for estimated time of procedure.     Case discussed with Dr. Mayes from IR and Dr. Abebe. This is a 62 year old female with pertinent hx of end stage COPD s/p bilateral sequential lung transplant (6/22) on triple IS (MMF/Tacro/pred), pyloric ulcer, recent ERCP c/f hemobilia, gastroparesis s/p GJ tube placement and Percutaneous J tube presents for a planned admission 9/9 from transplant pulmonology to work up antibody medicated rejection versus infection. She was noted to have hypermagnesemia and confusion which prompted evaluation of kidney function which showed significantly reduced eGFR by Cystatin C (eGFR ~10). Nephrology is now recommending HD initiation. Pt would like to wait until 9/26 to see if there is a change in her labs. IR will then proceed with TCVC placement for HD initiation in the setting of CKD 5 and uremic symptoms.     Expected date of discharge: TBD    Vitals:   BP (!) 140/70 (BP Location: Right arm, Cuff Size: Adult Regular)   Pulse 96   Temp 99.2  F (37.3  C) (Oral)   Resp 18   Ht 1.588 m (5' 2.5\")   Wt 69 kg (152 lb 1.6 oz)   SpO2 98%   BMI 27.38 kg/m      Pertinent Labs:     Lab Results   Component Value Date    WBC 6.4 09/23/2022    WBC 6.8 09/22/2022    WBC 6.1 09/21/2022    WBC 5.8 06/30/2021    WBC 5.2 06/14/2021       Lab Results   Component Value Date    HGB 8.0 09/23/2022    HGB 8.3 09/22/2022    HGB 7.9 09/21/2022    HGB 11.8 06/30/2021    HGB 12.9 06/30/2021    HGB 12.5 06/14/2021       Lab Results   Component Value Date     09/23/2022     09/22/2022     09/21/2022     06/30/2021     06/14/2021       Lab Results   Component Value Date    INR 0.94 " 09/15/2022    INR 0.93 06/14/2021    PTT 25 08/12/2022    PTT 28 06/14/2021       Lab Results   Component Value Date    POTASSIUM 4.5 09/22/2022    POTASSIUM 3.6 08/29/2022    POTASSIUM 5.2 07/06/2022    POTASSIUM 4.4 06/30/2021        RUFUS Waters CNP  Interventional Radiology  Pager: [phone]

## 2022-09-23 NOTE — PLAN OF CARE
Goal Outcome Evaluation:     HX:62 year old female with pertinent hx of end stage COPD s/p bilateral sequential lung transplant (6/22) on triple IS ( MMF/Tacro/pred), pyloric ulcer, recent ERCP c/f hemobilia, gastroparesis s/p GJ tube placement and Percutaneous J tube presents for a planned admission from transplant pulmonology to work up antibody medicated rejection versus infection    Cardiac:no monitor  VS:VSS  IV:PIV-SL  Tubes:pigtail CT in left posterior chest, G/J tube in abd for TF  Neuro:A/Ox4, forgetful, ST memory deficit  Resp:ANAYA, lungs clear, O2 sats 98% on 3L, decreased to 1L with sats remaining 98%. Using IS to 500.   GI/:Voiding, loose diarrhea.  Nutrition:Full liquids, drinking nepro carb steady on ice, ice water.  Endo:FSG covered with sliding scale.  Skin:Abd and forearm bruising, post CT site on right with gauze/tegaderm dressing until tomorrow, post CT on left with gauze/tegaderm dressing  Activity:Up to chair this AM, up to commode x4 this shift.   Pain:/O CT site pain. Treated with scheduled tylenol, icy hot patch, prn oxycodone, given as often as possible. Lidocaine patch ordered but patient refused.   Social:daughter present all shift.  Plan:monitor CT output, encourage good coughing and IS use, assess and treat for pain as indicated, encourage increased activity and participation in cares. Continue current caress and notify providers with questions and concerns.

## 2022-09-23 NOTE — PLAN OF CARE
Temp: 99.2  F (37.3  C) Temp src: Oral BP: (!) 140/70 Pulse: 96   Resp: 18 SpO2: 98 % O2 Device: Nasal cannula Oxygen Delivery: 1 LPM     Shift: [id number]  D: Admitted 9/9 with persistent ANAYA, daily AM hemoptysis, and increased BLE edema. Also noted to have persistent, increased bilateral pleural effusions, diffuse bilateral groundglass changes, and more focal appearing LLL infiltrates on imaging.   PMH: s/p BSLT (6/28/22) for COPD complicated by persistent bilateral pleural effusions, persistent CO2 retention, right hemidiaphragm palsy, BACILIO (dialysis 7/14-7/16), C. diff colitis, gastroparesis s/p GJ tube placement (7/27/22), GI bleed 2/2 pyloric ulcer, hemobilia s/p ERCP and MRCP (8/22), and persistent vasoplegia. Other history notable for HFpEF, NTM colonoization, hepatitis C, and methamphetamine use.      I: Monitored vitals and assessed pt status.   PRN: Oxycodone 5 mg x1  Mobility: Ax1/ SBA    A:  Neuro: A&O x4. Tremors present in BUE. Calls appropriately.  Cardiac: No tele order. Afebrile. Denies chest pain, palpable pulses.  Resp: VSS on 1L NC sating > 97%. No BiPAP overnight   GI/: Adequate urine output via bedside commode. x1 loose/watery stool during shift. Full liquid diet, continuous TF @ 40 mL/hr via J tube, G tube to gravity. BG q6hr w/ sliding scale.   Skin: No new deficits noted  LDA's: L PIV in place SL. L pigtail CT to water seal. GJ tube  Pain: CT incision pain, managed well with prn oxycodone and icy hot patch.  AM weight: 152 lb 1.6 oz    Plan: Continue to monitor and follow POC. Notify Gold 10 with any changes.

## 2022-09-23 NOTE — PROGRESS NOTES
Pulmonary Medicine  Cystic Fibrosis - Lung Transplant Team  Progress Note  2022       Patient: Sofie Rodriguez  MRN: [id number]  : 1960 (age 62 year old)  Transplant: 2022 (Lung), POD#87  Admission date: 2022    Assessment & Plan:     Sofie Rodriguez is a 62-year-old female s/p BSLT (22) for COPD complicated by persistent bilateral pleural effusions, persistent CO2 retention, right hemidiaphragm palsy, BACILIO (dialysis -), C. diff colitis, gastroparesis s/p GJ tube placement (22), GI bleed 2/2 pyloric ulcer, hemobilia s/p ERCP and MRCP (), and persistent vasoplegia.  Other history notable for HFpEF, NTM colonoization, hepatitis C, and methamphetamine use.  Patient admitted 22 with persistent dyspnea (increased with activity), daily morning hemoptysis, and increased BLE edema.  Also noted to have persistent, increased bilateral pleural effusions, diffuse bilateral groundglass changes, and more focal appearing LLL infiltrates on imaging.  Treating with aggressive diuresis with some improvement in symptoms and hypoxia.  Hemoptysis resolved, mild hypoxia and ANAYA persisting.  S/p right pigtail chest tube and aspiration of left pleural effusion () with subsequent pigtail chest tube (9/15) with lytic therapy.  Output and imaging relatively stable.  Worsening mentation and tremors noted .  Also, with worsening hypercapnia, treated with NIPPV for several days.  Mentation improved, likely d/t ABX for UTI as elevated BUN has not changed, additional workup unremarkable.     Today's recommendations:  - Sputum culture still needs collection  - Continue to defer NIPPV, repeat VBG prn with change in mentation/clinical status  - CXR daily while chest tube remains, remains stable  - Tacrolimus level ordered   - Prednisone taper due 10/13  - VGCV for CMV ppx through  (do not recommend dosing to Cystatin C lab), monitor CMV every other week upon completion of  VGCV  - DSA next due 10/5  - EBV ordered 10/7, IgG ordered 9/29  - Tunneled line placement tentatively planned for 9/26  - Repeat gastric emptying study to evaluate for ability to transition medications to PO and progress diet  - Repeat EGD 10/13 to check healing of ulcer, sooner if hgb declines further     S/p bilateral sequential lung transplant (BSLT) for end stage COPD:  Acute hypoxia with chronic hypercapnia:   Pulmonary edema:  Persistent bibasilar pleural effusions:   Right hemidiaphragm palsy: Admitted with increased dyspnea with minimal exertion and small volume daily hemoptysis.  Also with marked decline in PFTs (FEV1 1.22L, 50% on 8/18 to 0.98L, 40% on 9/7).  Chest CT (9/8) with increased effusions and groundglass changes.  DSA positive but stable (as below).  BNP markedly elevated (30k) and also with increased BLE edema.  Etiology unclear and is likely multi-factorial with DDx including pulmonary edema from hypervolemia/progressive HFpEF, rejection (ACR +/- AMR), alveolar hemorrhage, and/or infection.  Aggressively diuresed with some improvement in symptoms.  Bronchoscopy (9/13) unremarkable, BAL of lingula sent, cultures no growth (GPC on gram stain only).  S/p right pigtail chest tube placement (9/13-9/22), transudative with moderate output initially but quickly decreasing, cultures negative.  S/p aspiration of left pleural effusion (9/13) and then pigtail chest tube (9/15) s/p full lytic course (916-9/19), cultures also negative.  Chest CT (9/21) with decreased pleural effusions with continued prominent loculated component of right effusion and overall slight decrease in associated atelectasis.  Still with some ANAYA but no further hemoptysis since ~9/12.  Placed on continuous BiPAP 9/19-9/22 due to worsening hypercapnia and mentation, now improving.  - Sputum culture still needs collection, defer pulmonary ABX at this time  - Supplemental O2 to keep >92%, IS and Aerobika q1h w/a  - Continue to defer  NIPPV, repeat VBG prn with change in mentation/clinical status  - Left chest tube to water seal (9/21), defer removal today  - CXR daily while chest tube remains, today with stable small left basilar effusion, trace on right (personally reviewed)     Immunosuppression:  - Tacrolimus 4.5 mg qAM / 4 mg qPM (increased 9/19).  Goal level 8-12.  Repeat level 9/26 (ordered).  - Azathioprine 100 mg daily (9/20, MMF stopped due to ongoing diarrhea)  - Prednisone 10 mg qAM / 7.5 mg qPM with next taper due 10/13 (not yet ordered), defer accelerated taper (with elevated BUN) at this time  Date AM dose (mg) PM dose (mg)   9/15/22 10 7.5   10/13/22 7.5 7.5   11/10/22 7.5 5   12/8/22 5 5   1/5/23 5 2.5      Prophylaxis:   - Dapsone qMWF for PJP ppx (resumed 9/19, monitor for worsening anemia; Bactrim stopped d/t hyperkalemia, will need to monitor for recurrence of anemia with dapsone; Pentamidine neb not tolerated on 9/7 after only 5 minutes d/t excessive coughing)  - VGCV (renally dose, do not recommend dosing to Cystatin C lab) for CMV ppx through 9/28, D+/R+, CMV (9/13) negative, monitor CMV every other week upon completion of VGCV     Positive DSA: Newly positive on 8/10 with DQB2 mfi 2155.   - Monitoring m2ikfxo, next due 10/5, see below for trend:  Date DQB2 Notes   8/10 2155    9/1 7033 Current peak   9/21 5390       EBV viremia: Low level (1374 on 9/7), not likely to be clinically significant.  - Follow EBV monthly (10/7, ordered)     Hypogammaglobulinemia: IgG adequate at time of transplant, repeat level low (336) on 7/28.  S/p IVIG 7/30, tolerated well.  IgG on 8/29 adequate at 672.   - Follow IgG monthly (9/29, ordered)     Other relevant problems being managed by the primary team:     Encephalopathy:  Tremors:   Presumed UTI: Noted 9/19 with confusion as well as tremor.  DDx including hypercapnia, hyperammonia, infection, uremia (see below), medication related.  VBG with worsening hypercapnia (99).  BUN elevated  (112).  Ammonia normal (24).  CRP normal, procal 0.1.  UA with moderate blood, large leukocyte esterase, 3 RBC, 29 WBC, and many bacteria.  Urine culture (9/19) with mixture of urogenital josue.  Tacrolimus levels recently subtherapeutic.  Head CT (9/20) without acute intracranial pathology.  - Blood culture (9/19) NGTD  - ABX: IV ceftriaxone (9/20) per primary team given concern for UTI     Likely CKD5 (in light of recent cystatin C): Nephrology consulted 9/21, see their note for details, with concern for CKD5 as Cr may suggest overestimation of kidney function with limited muscle mass, unclear if trend over the past week reflects BACILIO.  Cystatin C 4.3 with GFR 10.  Making urine.  Renal US (9/22) normal.  HD trial discussed with pt. and daughter on 9/22, pt. considering.  - Nephrology following, considering HD trial, tunneled line placement tentatively planned for 9/26     Diarrhea: C diff negative on 9/10 and 9/20.  Likely medication related (MMF), but unable to transition to Myfortic given NPO.  Loperamide utilized with some benefit.  Goal to titrate to antidiarrheal 3 stools daily, management per primary.  Enteric stool panel negative 9/16.  Transitioned from MMF to AZA as above.     Severe gastroparesis:   Pyloric ulcer: Gastric emptying study 7/20 with severe gastric emptying delay (95% retention at 4 hours), s/p GJ tube placement in IR 7/27.  S/p EGD (8/3) noted to have pyloric ulcer and excessive gastric fluid and residual food.  S/p EGD (8/11) with mild erythema 2/2 GJ tube trauma seen near insertion site but no active bleeding.  - PPI BID  - TF continuous and ALL enteral medications via J tube  - G tube to gravity drainage; full liquid diet for comfort only  - Repeat gastric emptying study to evaluate for ability to transition medications to PO and progress diet  - Repeat EGD 10/13 to check healing of ulcer, sooner if hgb declines further     We appreciate the excellent care provided by the [place] [id number]  "team.  Recommendations communicated via in person rounding and this note.  Will continue to follow along closely, please do not hesitate to call with any questions or concerns.    Patient discussed with Dr. Paredes.    Catherine Johnson, DNP, APRN, CNP  Inpatient Nurse Practitioner  Pulmonary CF/Transplant     Subjective & Interval History:     On 1L NC but likely able to wean further with documented SpO2 %, off NIPPV since the night before last.  Occasional cough and sputum.  Right chest tube out yesterday, left remains with improving output.  Mentation normal, feels like herself again with no further fogginess or confusion.      Review of Systems:     C: No fever, no chills, no change in weight, no change in appetite  INTEGUMENTARY/SKIN: No rash or obvious new lesions  ENT/MOUTH: No sore throat, no sinus pain, no nasal congestion or drainage  RESP: See interval history  CV: No chest pain, no palpitations, + peripheral edema, no orthopnea  GI: No nausea, no vomiting, + stable loose stools, no reflux symptoms  : No dysuria  MUSCULOSKELETAL: No myalgias, no arthralgias  ENDOCRINE: Blood sugars with adequate control  NEURO: + occ headache, no numbness or tingling  PSYCHIATRIC: Mood stable    Physical Exam:     All notes, images, and labs from past 24 hours (at minimum) were reviewed.    Vital signs:  Temp: 99.2  F (37.3  C) Temp src: Oral BP: (!) 140/70 Pulse: 96   Resp: 18 SpO2: 98 % O2 Device: Nasal cannula Oxygen Delivery: 1 LPM Height: 158.8 cm (5' 2.5\") Weight: 69 kg (152 lb 1.6 oz)  I/O:     Intake/Output Summary (Last 24 hours) at 9/23/2022 0730  Last data filed at 9/23/2022 0700  Gross per 24 hour   Intake 320 ml   Output 1625 ml   Net -1305 ml     Constitutional: Sitting up in a chair, in no apparent distress.   HEENT: Eyes with pink conjunctivae, anicteric.  Oral mucosa moist without lesions.  PULM: Diminished bases bilaterally L>R.  No crackles, no rhonchi, no wheezes.  Non-labored breathing on 1L NC.  " Left chest tube with occasional small air leak with cough, serous.  CV: Normal S1 and S2.  RRR.  + faint murmur, gallop, or rub.  Trace BLE edema.   ABD: NABS, soft, nontender, nondistended.  PEG/J tube site not visualized.  MSK: Moves all extremities.  No apparent muscle wasting.   NEURO: Alert and oriented, conversant.  + mild tremors (minimal).  SKIN: Warm, dry.  No rash on limited exam.   PSYCH: Mood stable.     Lines, Drains, and Devices:  Peripheral IV 09/10/22 Anterior;Left Lower forearm (Active)   Site Assessment WDL 09/23/22 0500   Line Status Saline locked 09/23/22 0500   Dressing Intervention New dressing  09/10/22 0135   Phlebitis Scale 0-->no symptoms 09/23/22 0500   Infiltration Scale 0 09/23/22 0500   Number of days: 13     Data:     LABS    CMP:   Recent Labs   Lab 09/23/22  0511 09/22/22  2214 09/22/22  1737 09/22/22  1301 09/22/22  1149 09/22/22  0551 09/21/22  0843 09/21/22  0734 09/20/22  1012 09/20/22  0701 09/19/22  0817 09/19/22  0546   NA  --   --   --   --   --  139  --  142  --  141  --  140   POTASSIUM  --   --   --   --   --  4.5  --  4.1  --  4.3  --  4.5   CHLORIDE  --   --   --   --   --  89*  --  89*  --  87*  --  89*   CO2  --   --   --   --   --  46*  --  48*  --  46*  --  47*   ANIONGAP  --   --   --   --   --  4*  --  5*  --  8  --  4*   * 165* 176* 175*  175*   < > 160*   < > 134*   < > 163*   < > 176*   BUN  --   --   --   --   --  106.0*  --  110.0*  --  112.0*  --  107.0*   CR  --   --   --   --   --  1.63*  --  1.63*  --  1.78*  --  1.78*   GFRESTIMATED  --   --   --   --   --  36*  --  36*  --  32*  --  32*   ESTUARDO  --   --   --   --   --  9.4  --  9.3  --  9.0  --  9.3   MAG  --   --   --   --   --   --   --  2.9*  --  2.8*  --  2.7*   PHOS  --   --   --   --   --   --   --   --   --  4.0  --  3.9   PROTTOTAL  --   --   --   --   --  5.6*  --  5.6*  --  5.2*  --  5.7*   ALBUMIN  --   --   --   --   --  3.1*  --  3.2*  --  3.1*  --  3.2*   BILITOTAL  --   --   --   --    --  <0.2  --  <0.2  --  <0.2  --  <0.2   ALKPHOS  --   --   --   --   --  84  --  82  --  75  --  90   AST  --   --   --   --   --  18  --  16  --  15  --  13   ALT  --   --   --   --   --  <5*  --  9*  --  9*  --  6*    < > = values in this interval not displayed.     CBC:   Recent Labs   Lab 09/23/22  0536 09/22/22  0551 09/21/22  0734 09/20/22  0701   WBC 6.4 6.8 6.1 5.9   RBC 2.55* 2.63* 2.51* 2.36*   HGB 8.0* 8.3* 7.9* 7.3*   HCT 26.8* 27.8* 26.4* 25.3*   * 106* 105* 107*   MCH 31.4 31.6 31.5 30.9   MCHC 29.9* 29.9* 29.9* 28.9*   RDW 19.3* 19.4* 19.3* 18.9*    217 191 186       INR: No lab results found in last 7 days.    Glucose:   Recent Labs   Lab 09/23/22  0511 09/22/22  2214 09/22/22  1737 09/22/22  1301 09/22/22  1149 09/22/22  0551   * 165* 176* 175*  175* 174* 160*       Blood Gas:   Recent Labs   Lab 09/22/22  0551 09/21/22  1648 09/21/22  1002   PHV 7.42 7.41 7.46*   PCO2V 82* 82* 72*   PO2V 26 24* 16*   HCO3V 53* 52* 51*   LINDY 23.8* 24.2* -1.7   O2PER 30 1 21       Culture Data No results for input(s): CULT in the last 168 hours.    Virology Data:   Lab Results   Component Value Date    FLUAH1 Not Detected 09/13/2022    FLUAH3 Not Detected 09/13/2022    HO7522 Not Detected 09/13/2022    IFLUB Not Detected 09/13/2022    RSVA Not Detected 09/13/2022    RSVB Not Detected 09/13/2022    PIV1 Not Detected 09/13/2022    PIV2 Not Detected 09/13/2022    PIV3 Not Detected 09/13/2022    HMPV Not Detected 09/13/2022       Historical CMV results (last 3 of prior testing):  Lab Results   Component Value Date    CMVQNT Not Detected 09/13/2022    CMVQNT Not Detected 09/07/2022    CMVQNT Not Detected 09/01/2022     No results found for: CMVLOG    Urine Studies    Recent Labs   Lab Test 09/19/22  2254 08/01/22  0319 07/08/22  0831   URINEPH 7.0 8.0* 5.5   NITRITE Negative Negative Negative   LEUKEST Large* Negative Negative   WBCU 29*  --  1       Most Recent [place] Pulmonary Function Testing  (FVC/FEV1 only)  FVC-Pre   Date Value Ref Range Status   09/07/2022 1.01 L    09/01/2022 1.07 L    08/24/2022 1.23 L    08/18/2022 1.33 L      FVC-%Pred-Pre   Date Value Ref Range Status   09/07/2022 33 %    09/01/2022 35 %    08/24/2022 40 %    08/18/2022 43 %      FEV1-Pre   Date Value Ref Range Status   09/07/2022 0.98 L    09/01/2022 1.02 L    08/24/2022 1.17 L    08/18/2022 1.22 L      FEV1-%Pred-Pre   Date Value Ref Range Status   09/07/2022 40 %    09/01/2022 42 %    08/24/2022 48 %    08/18/2022 50 %        IMAGING    Recent Results (from the past 48 hour(s))   CT Chest w/o Contrast    Narrative    CT chest without contrast    INDICATION: Follow-up for bilateral chest tube post bilateral pigtail  placement    COMPARISON: 9/15/2022 CT-guided imaging procedural films and  diagnostic CT 9/8/2022    FINDINGS: The included thyroid appears normal. Probable reactive  mediastinal lymph nodes are similar to 9/8/2022. Thoracic aorta is  normal in size. Heart size borderline enlarged  but there is left  atrial cardiac chamber enlargement. Multivessel coronary artery  calcium. Clamshell sternotomy from prior bilateral lung transplant. No  enlarged axillary lymph nodes. Decreased pleural effusions bilaterally  with bilateral chest tubes.  Detail of the lung parenchyma shows focal atelectatic changes  bilaterally with other septal thickening likely indicating residual  fluid. No suspicious ectopic air collection. Air noted within the  pleural effusions likely related the catheter placement.  Upper abdomen shows pneumobilia. Atelectatic changes with round  atelectasis in the left upper lobe appears slightly decreased.  Loculated component of the right pleural effusion appears similar to  9/8/2022.  Bone detail again shows a clamshell sternotomy. Mineralization of  bones is otherwise good with mild degenerative disc changes in the  thoracic spine.      Impression    IMPRESSION: Decreased pleural effusions with continued  prominent  loculated component of the right effusion. Overall slight decrease in  associated atelectasis. Prior bilateral lung transplant. Bilateral  chest catheters. Pneumobilia.    ALVINA HARRY MD         SYSTEM ID:  [id number]   XR Chest 2 Views    Narrative    Chest 2 views    INDICATION: Interval follow-up.  Lung transplant.    COMPARISON: Chest CT today an plain film yesterday    FINDINGS: Bilateral chest catheters are again present. Left  costophrenic angle blunting persists. Clamshell sternotomy from prior  bilateral lung transplant again noted. No new ectopic air collections  or suspicious opacities. Heart size normal.      Impression    IMPRESSION: Prior bilateral lung transplant with unchanged left  pleural effusion radiographically. Bibasilar chest catheters.    ALVINA HARRY MD         SYSTEM ID:  [id number]   US Lower Extremity Venous Duplex Left    Narrative    EXAMINATION: DOPPLER VENOUS ULTRASOUND OF THE LEFT LOWER EXTREMITY,  9/21/2022 4:22 PM     COMPARISON: None.    HISTORY: 62-year-old female with left lower extremity swelling.    TECHNIQUE:  Gray-scale evaluation with compression, spectral flow, and  color Doppler assessment of the deep venous system of the left leg  from groin to knee, and then at the ankle.    FINDINGS:  In the left lower extremity, the common femoral, femoral, popliteal  and posterior tibial veins demonstrate normal compressibility and  blood flow.      Impression    IMPRESSION:  1.  No evidence left lower extremity deep venous thrombosis.    I have personally reviewed the examination and initial interpretation  and I agree with the findings.    KANDACE ROJAS MD         SYSTEM ID:  [id number]   XR Chest 2 Views    Narrative    EXAM: XR CHEST 2 VIEWS  9/22/2022 11:16 AM      HISTORY: interval follow up, chest tubes, lung transplant history    COMPARISON: Chest x-ray 9/21/2022    FINDINGS: PA and lateral radiographs of the chest. Postsurgical  changes of bilateral lung  transplantation with clamshell sternotomy  wires. Stable positioning of bibasilar chest tubes. The trachea is  midline. The cardiac silhouette is stable. Small left pleural  effusion. Fluid within the right major and minor fissures. No  pneumothorax. Stable streaky perihilar and basilar opacities. The  visualized upper abdomen is unremarkable. Degenerative changes of the  spine.       Impression    IMPRESSION:   1. Small left pleural effusion.  2. Stable streaky perihilar and bibasilar opacities.  3. Postsurgical changes of bilateral lung transplantation with stable  positioning of bibasilar chest tubes.    I have personally reviewed the examination and initial interpretation  and I agree with the findings.    JOHANN MORAES MD         SYSTEM ID:  [id number]   US Renal Complete    Narrative    EXAMINATION: US RENAL COMPLETE, 9/22/2022 2:46 PM     COMPARISON: None.    HISTORY: 6 acute kidney injury.    TECHNIQUE: The kidneys and bladder were scanned in the standard  fashion with specialized ultrasound transducer(s) using both gray  scale and limited color/spectral Doppler techniques.    FINDINGS:    Right kidney: Measures 8.0 cm in length. Parenchyma is of normal  thickness and echogenicity. No focal mass. No hydronephrosis.    Left kidney: Measures 8.2 cm in length. Parenchyma is of normal  thickness and echogenicity. No focal mass. No hydronephrosis.     Bladder: Within normal limits.      Impression    IMPRESSION: No hydronephrosis

## 2022-09-23 NOTE — PROGRESS NOTES
CLINICAL NUTRITION SERVICES     Per RN, G-tube is clamped and no longer to gravity. Per RN, pt is open to trialing oral supplements.     INTERVENTIONS:  Implementation:  Medical food supplement therapy: Ordered Nepro (K+ was 5 today, 9/23) at 10:00 and 14:00.     Follow up/Monitoring:  Will continue to follow pt.    Kathie Bateman, MS, RD, LD, [facility]   6C Pgr: [phone]

## 2022-09-24 ENCOUNTER — APPOINTMENT (OUTPATIENT)
Dept: GENERAL RADIOLOGY | Facility: CLINIC | Age: 62
DRG: 205 | End: 2022-09-24
Attending: PHYSICIAN ASSISTANT
Payer: MEDICARE

## 2022-09-24 ENCOUNTER — APPOINTMENT (OUTPATIENT)
Dept: OCCUPATIONAL THERAPY | Facility: CLINIC | Age: 62
DRG: 205 | End: 2022-09-24
Attending: INTERNAL MEDICINE
Payer: MEDICARE

## 2022-09-24 LAB
ALBUMIN SERPL BCG-MCNC: 3 G/DL (ref 3.5–5.2)
ALP SERPL-CCNC: 87 U/L (ref 35–104)
ALT SERPL W P-5'-P-CCNC: 10 U/L (ref 10–35)
ANION GAP SERPL CALCULATED.3IONS-SCNC: 8 MMOL/L (ref 7–15)
AST SERPL W P-5'-P-CCNC: 17 U/L (ref 10–35)
BACTERIA BLD CULT: NO GROWTH
BASOPHILS # BLD AUTO: 0 10E3/UL (ref 0–0.2)
BASOPHILS NFR BLD AUTO: 0 %
BILIRUB SERPL-MCNC: <0.2 MG/DL
BUN SERPL-MCNC: 99.5 MG/DL (ref 8–23)
CALCIUM SERPL-MCNC: 9.5 MG/DL (ref 8.8–10.2)
CHLORIDE SERPL-SCNC: 89 MMOL/L (ref 98–107)
CREAT SERPL-MCNC: 1.59 MG/DL (ref 0.51–0.95)
CYSTATIN C (ROCHE): 4.3 MG/L (ref 0.6–1)
DEPRECATED HCO3 PLAS-SCNC: 44 MMOL/L (ref 22–29)
EOSINOPHIL # BLD AUTO: 0.1 10E3/UL (ref 0–0.7)
EOSINOPHIL NFR BLD AUTO: 1 %
ERYTHROCYTE [DISTWIDTH] IN BLOOD BY AUTOMATED COUNT: 19.6 % (ref 10–15)
GFR SERPL CREATININE-BSD FRML MDRD: 10 ML/MIN/1.73M2
GFR SERPL CREATININE-BSD FRML MDRD: 37 ML/MIN/1.73M2
GLUCOSE BLDC GLUCOMTR-MCNC: 146 MG/DL (ref 70–99)
GLUCOSE BLDC GLUCOMTR-MCNC: 154 MG/DL (ref 70–99)
GLUCOSE BLDC GLUCOMTR-MCNC: 182 MG/DL (ref 70–99)
GLUCOSE BLDC GLUCOMTR-MCNC: 192 MG/DL (ref 70–99)
GLUCOSE SERPL-MCNC: 163 MG/DL (ref 70–99)
HCT VFR BLD AUTO: 26.9 % (ref 35–47)
HGB BLD-MCNC: 7.9 G/DL (ref 11.7–15.7)
IMM GRANULOCYTES # BLD: 0.2 10E3/UL
IMM GRANULOCYTES NFR BLD: 3 %
LYMPHOCYTES # BLD AUTO: 0.3 10E3/UL (ref 0.8–5.3)
LYMPHOCYTES NFR BLD AUTO: 4 %
MAGNESIUM SERPL-MCNC: 2.8 MG/DL (ref 1.7–2.3)
MAGNESIUM SERPL-MCNC: 2.8 MG/DL (ref 1.7–2.3)
MCH RBC QN AUTO: 31 PG (ref 26.5–33)
MCHC RBC AUTO-ENTMCNC: 29.4 G/DL (ref 31.5–36.5)
MCV RBC AUTO: 106 FL (ref 78–100)
MONOCYTES # BLD AUTO: 0.5 10E3/UL (ref 0–1.3)
MONOCYTES NFR BLD AUTO: 7 %
NEUTROPHILS # BLD AUTO: 6 10E3/UL (ref 1.6–8.3)
NEUTROPHILS NFR BLD AUTO: 85 %
NRBC # BLD AUTO: 0 10E3/UL
NRBC BLD AUTO-RTO: 0 /100
PLAT MORPH BLD: NORMAL
PLATELET # BLD AUTO: 170 10E3/UL (ref 150–450)
POTASSIUM SERPL-SCNC: 4.1 MMOL/L (ref 3.4–5.3)
PROT SERPL-MCNC: 5.3 G/DL (ref 6.4–8.3)
RBC # BLD AUTO: 2.55 10E6/UL (ref 3.8–5.2)
RBC MORPH BLD: NORMAL
SODIUM SERPL-SCNC: 141 MMOL/L (ref 136–145)
WBC # BLD AUTO: 7.1 10E3/UL (ref 4–11)

## 2022-09-24 PROCEDURE — 250N000012 HC RX MED GY IP 250 OP 636 PS 637: Performed by: NURSE PRACTITIONER

## 2022-09-24 PROCEDURE — 250N000011 HC RX IP 250 OP 636: Performed by: INTERNAL MEDICINE

## 2022-09-24 PROCEDURE — 250N000013 HC RX MED GY IP 250 OP 250 PS 637: Performed by: INTERNAL MEDICINE

## 2022-09-24 PROCEDURE — 250N000011 HC RX IP 250 OP 636: Performed by: STUDENT IN AN ORGANIZED HEALTH CARE EDUCATION/TRAINING PROGRAM

## 2022-09-24 PROCEDURE — 71046 X-RAY EXAM CHEST 2 VIEWS: CPT | Mod: 26 | Performed by: RADIOLOGY

## 2022-09-24 PROCEDURE — 36415 COLL VENOUS BLD VENIPUNCTURE: CPT | Performed by: INTERNAL MEDICINE

## 2022-09-24 PROCEDURE — 250N000013 HC RX MED GY IP 250 OP 250 PS 637: Performed by: STUDENT IN AN ORGANIZED HEALTH CARE EDUCATION/TRAINING PROGRAM

## 2022-09-24 PROCEDURE — 214N000001 HC R&B CCU UMMC

## 2022-09-24 PROCEDURE — 99233 SBSQ HOSP IP/OBS HIGH 50: CPT | Performed by: INTERNAL MEDICINE

## 2022-09-24 PROCEDURE — 97165 OT EVAL LOW COMPLEX 30 MIN: CPT | Mod: GO

## 2022-09-24 PROCEDURE — 80053 COMPREHEN METABOLIC PANEL: CPT | Performed by: INTERNAL MEDICINE

## 2022-09-24 PROCEDURE — 83735 ASSAY OF MAGNESIUM: CPT | Performed by: INTERNAL MEDICINE

## 2022-09-24 PROCEDURE — 97530 THERAPEUTIC ACTIVITIES: CPT | Mod: GO

## 2022-09-24 PROCEDURE — 71046 X-RAY EXAM CHEST 2 VIEWS: CPT

## 2022-09-24 PROCEDURE — 250N000009 HC RX 250: Performed by: INTERNAL MEDICINE

## 2022-09-24 PROCEDURE — 85025 COMPLETE CBC W/AUTO DIFF WBC: CPT | Performed by: INTERNAL MEDICINE

## 2022-09-24 PROCEDURE — 97535 SELF CARE MNGMENT TRAINING: CPT | Mod: GO

## 2022-09-24 PROCEDURE — 82610 CYSTATIN C: CPT | Performed by: NURSE PRACTITIONER

## 2022-09-24 PROCEDURE — 250N000013 HC RX MED GY IP 250 OP 250 PS 637

## 2022-09-24 PROCEDURE — 250N000013 HC RX MED GY IP 250 OP 250 PS 637: Performed by: NURSE PRACTITIONER

## 2022-09-24 PROCEDURE — 99233 SBSQ HOSP IP/OBS HIGH 50: CPT | Mod: 24 | Performed by: NURSE PRACTITIONER

## 2022-09-24 RX ADMIN — NYSTATIN 1000000 UNITS: 100000 SUSPENSION ORAL at 08:38

## 2022-09-24 RX ADMIN — Medication 40 MG: at 20:59

## 2022-09-24 RX ADMIN — INSULIN ASPART 1 UNITS: 100 INJECTION, SOLUTION INTRAVENOUS; SUBCUTANEOUS at 12:04

## 2022-09-24 RX ADMIN — OXYCODONE HYDROCHLORIDE 5 MG: 5 SOLUTION ORAL at 20:59

## 2022-09-24 RX ADMIN — Medication 1 PACKET: at 21:02

## 2022-09-24 RX ADMIN — PREDNISOLONE 10 MG: 15 SOLUTION ORAL at 08:39

## 2022-09-24 RX ADMIN — LOPERAMIDE HYDROCHLORIDE 2 MG: 2 CAPSULE ORAL at 12:06

## 2022-09-24 RX ADMIN — Medication 100 MG: at 08:39

## 2022-09-24 RX ADMIN — HEPARIN SODIUM 5000 UNITS: 5000 INJECTION, SOLUTION INTRAVENOUS; SUBCUTANEOUS at 20:59

## 2022-09-24 RX ADMIN — MULTIVITAMIN 15 ML: LIQUID ORAL at 08:40

## 2022-09-24 RX ADMIN — ACETAMINOPHEN 975 MG: 325 SOLUTION ORAL at 08:39

## 2022-09-24 RX ADMIN — CYANOCOBALAMIN TAB 500 MCG 500 MCG: 500 TAB at 08:45

## 2022-09-24 RX ADMIN — Medication 1 PACKET: at 12:15

## 2022-09-24 RX ADMIN — TACROLIMUS 4 MG: 5 CAPSULE ORAL at 18:43

## 2022-09-24 RX ADMIN — CALCIUM CARBONATE 600 MG (1,500 MG)-VITAMIN D3 400 UNIT TABLET 1 TABLET: at 18:42

## 2022-09-24 RX ADMIN — ORAL VEHICLES - SUSP 12.5 MG: SUSPENSION at 08:38

## 2022-09-24 RX ADMIN — INSULIN GLARGINE 6 UNITS: 100 INJECTION, SOLUTION SUBCUTANEOUS at 08:40

## 2022-09-24 RX ADMIN — ORAL VEHICLES - SUSP 12.5 MG: SUSPENSION at 20:59

## 2022-09-24 RX ADMIN — Medication 40 MG: at 08:39

## 2022-09-24 RX ADMIN — INSULIN ASPART 2 UNITS: 100 INJECTION, SOLUTION INTRAVENOUS; SUBCUTANEOUS at 04:06

## 2022-09-24 RX ADMIN — LIDOCAINE PATCH 4% 2 PATCH: 40 PATCH TOPICAL at 21:03

## 2022-09-24 RX ADMIN — FOLIC ACID 1 MG: 1 TABLET ORAL at 08:38

## 2022-09-24 RX ADMIN — NYSTATIN 1000000 UNITS: 100000 SUSPENSION ORAL at 16:43

## 2022-09-24 RX ADMIN — CEFTRIAXONE SODIUM 2 G: 2 INJECTION, POWDER, FOR SOLUTION INTRAMUSCULAR; INTRAVENOUS at 12:16

## 2022-09-24 RX ADMIN — CALCIUM CARBONATE 600 MG (1,500 MG)-VITAMIN D3 400 UNIT TABLET 1 TABLET: at 08:38

## 2022-09-24 RX ADMIN — NYSTATIN 1000000 UNITS: 100000 SUSPENSION ORAL at 20:59

## 2022-09-24 RX ADMIN — OXYCODONE HYDROCHLORIDE 5 MG: 5 SOLUTION ORAL at 08:38

## 2022-09-24 RX ADMIN — MENTHOL 1 PATCH: 205.5 PATCH TOPICAL at 12:03

## 2022-09-24 RX ADMIN — OXYCODONE HYDROCHLORIDE 5 MG: 5 SOLUTION ORAL at 12:16

## 2022-09-24 RX ADMIN — GABAPENTIN 100 MG: 250 SOLUTION ORAL at 22:17

## 2022-09-24 RX ADMIN — HEPARIN SODIUM 5000 UNITS: 5000 INJECTION, SOLUTION INTRAVENOUS; SUBCUTANEOUS at 08:39

## 2022-09-24 RX ADMIN — INSULIN ASPART 1 UNITS: 100 INJECTION, SOLUTION INTRAVENOUS; SUBCUTANEOUS at 21:02

## 2022-09-24 RX ADMIN — OXYCODONE HYDROCHLORIDE 5 MG: 5 SOLUTION ORAL at 16:48

## 2022-09-24 RX ADMIN — PREDNISOLONE 7.5 MG: 15 SOLUTION ORAL at 20:59

## 2022-09-24 RX ADMIN — TACROLIMUS 4.5 MG: 5 CAPSULE ORAL at 08:38

## 2022-09-24 RX ADMIN — INSULIN ASPART 1 UNITS: 100 INJECTION, SOLUTION INTRAVENOUS; SUBCUTANEOUS at 16:48

## 2022-09-24 ASSESSMENT — ACTIVITIES OF DAILY LIVING (ADL)
ADLS_ACUITY_SCORE: 34
PREVIOUS_RESPONSIBILITIES: MEAL PREP;HOUSEKEEPING;LAUNDRY;SHOPPING;MEDICATION MANAGEMENT;FINANCES;DRIVING
ADLS_ACUITY_SCORE: 34

## 2022-09-24 NOTE — PLAN OF CARE
Goal Outcome Evaluation:     HX:62 year old female with pertinent hx of end stage COPD s/p bilateral sequential lung transplant (6/22) on triple IS ( MMF/Tacro/pred), pyloric ulcer, recent ERCP c/f hemobilia, gastroparesis s/p GJ tube placement and Percutaneous J tube presents for a planned admission from transplant pulmonology to work up antibody medicated rejection versus infection     Cardiac:no monitor  VS:VSS  IV:PIV-SL  Tubes:pigtail CT in left posterior chest, G/J tube in abd for TF. G/J tube dressing changed, scant green.brown drainage on dressing.   Neuro:A/Ox4, forgetful, ST memory deficit  Resp:ANAYA, lungs clear. RA sats after ambulating to and from bathroom and during sleep 85%. O2 sats 98% on 2L, decreased to 1L with sats remaining 98% during sleep. Placed on .5L with sats 93-94%. Reminded to use IS.   GI/:Voiding, loose diarrhea.  Nutrition:Full liquids, refusing anything to eat or drink except for water. TF Nepro carb steady at 40ml/hour.   Endo:FSG covered with sliding scale, every 6 hours. Lantus at night.   Skin:Abd and forearm bruising, post CT site on right C/D, cleaned with CHG swab, open to air. Post CT on left with gauze/tegaderm dressing-changed.   Activity:Up to chair this AM and PM, ambulated to bathroom 4 times this shift.   Pain:C/O CT site pain. Tube reinforced during dressing change.Treated with scheduled tylenol, icy hot patch, prn oxycodone, given as often as possible. Lidocaine patch ordered but patient refused.   Social:daughter present all shift.  Plan:monitor CT output, encourage good coughing and IS use, assess and treat for pain as indicated, encourage increased activity and participation in cares. Continue current caress and notify providers with questions and concerns. CT will hopefully be pulled tomorrow.

## 2022-09-24 NOTE — PLAN OF CARE
Temp: 98.8  F (37.1  C) Temp src: Oral BP: (!) 146/71 Pulse: 96   Resp: 20 SpO2: 96 % O2 Device: Nasal cannula Oxygen Delivery: 1 LPM     D: 62 year old female with pertinent hx of end stage COPD s/p bilateral sequential lung transplant (6/22) on triple IS ( MMF/Tacro/pred), pyloric ulcer, recent ERCP c/f hemobilia, gastroparesis s/p GJ tube placement and Percutaneous J tube presents for a planned admission from transplant pulmonology to work up antibody medicated rejection versus infection       I: Monitored vitals and assessed pt status.      Tele: No Tele  O2: 1 LPM   Mobility: 1X      A: Neuro: A/O x4.  Call light appropriate.  Able to make needs known. A little forgetful w/ memory defecits.  Respiratory: 1LPM -> 98%.  Lung sounds clear.  Denies shortness of breath at rest.  Cardiac: VSS - No tele  GI: Last BM today.  Loose d/t tube feeds. No report of nausea or vomiting.  : Voiding adequate clear, yellow urine.  Endo:  Blood sugars Q6h  Skin: Intact  LDA: G/J tube for tube feeds. CT LPC, PIV LFA    Electrolytes: no RN managed protocols ordered.  Pain: denies  Isolation: Standard Precautions  Diet: Full liquid     P: NPO Monday night for insertion of CVC & possible HD. Continue to monitor Pt status and report changes to [facility].      Blair Sepulveda RN

## 2022-09-24 NOTE — PROGRESS NOTES
Pulmonary Medicine  Cystic Fibrosis - Lung Transplant Team  Progress Note  2022       Patient: Sofie Rodriguez  MRN: [id number]  : 1960 (age 62 year old)  Transplant: 2022 (Lung), POD#88  Admission date: 2022    Assessment & Plan:     Sofie Rodriguez is a 62-year-old female s/p BSLT (22) for COPD complicated by persistent bilateral pleural effusions, persistent CO2 retention, right hemidiaphragm palsy, BACILIO (dialysis -), C. diff colitis, gastroparesis s/p GJ tube placement (22), GI bleed 2/2 pyloric ulcer, hemobilia s/p ERCP and MRCP (), and persistent vasoplegia.  Other history notable for HFpEF, NTM colonoization, hepatitis C, and methamphetamine use.  Patient admitted 22 with persistent dyspnea (increased with activity), daily morning hemoptysis, and increased BLE edema.  Also noted to have persistent, increased bilateral pleural effusions, diffuse bilateral groundglass changes, and more focal appearing LLL infiltrates on imaging.  Treating with aggressive diuresis with some improvement in symptoms and hypoxia.  Hemoptysis resolved, mild hypoxia and ANAYA persisting.  S/p right pigtail chest tube and aspiration of left pleural effusion () with subsequent pigtail chest tube (9/15) with lytic therapy.  Output and imaging relatively stable.  Worsening mentation and tremors noted .  Also, with worsening hypercapnia, treated with NIPPV for several days.  Mentation improved, likely d/t ABX for UTI as elevated BUN has not changed, additional workup unremarkable.     Today's recommendations:  - Continue to defer NIPPV, repeat VBG prn with change in mentation/clinical status  - CXR daily while chest tube remains, remains stable, considering chest tube removal tomorrow  - Tacrolimus level ordered   - Prednisone taper due 10/13  - VGCV for CMV ppx through  (do not recommend dosing to Cystatin C lab), monitor CMV every other week upon completion of  VGCV  - DSA next due 10/5  - EBV ordered 10/7, IgG ordered 9/29  - Tunneled line placement tentatively planned for 9/26  - Repeat gastric emptying study to evaluate for ability to transition medications to PO and progress diet, likely 9/26  - Repeat EGD 10/13 to check healing of ulcer, sooner if hgb declines further     S/p bilateral sequential lung transplant (BSLT) for end stage COPD:  Acute hypoxia with chronic hypercapnia:   Pulmonary edema:  Persistent bibasilar pleural effusions:   Right hemidiaphragm palsy: Admitted with increased dyspnea with minimal exertion and small volume daily hemoptysis.  Also with marked decline in PFTs (FEV1 1.22L, 50% on 8/18 to 0.98L, 40% on 9/7).  Chest CT (9/8) with increased effusions and groundglass changes.  DSA positive but stable (as below).  BNP markedly elevated (30k) and also with increased BLE edema.  Etiology unclear and is likely multi-factorial with DDx including pulmonary edema from hypervolemia/progressive HFpEF, rejection (ACR +/- AMR), alveolar hemorrhage, and/or infection.  Aggressively diuresed with some improvement in symptoms.  Bronchoscopy (9/13) unremarkable, BAL of lingula sent, cultures no growth (GPC on gram stain only).  S/p right pigtail chest tube placement (9/13-9/22), transudative with moderate output initially but quickly decreasing, cultures negative.  S/p aspiration of left pleural effusion (9/13) and then pigtail chest tube (9/15) s/p full lytic course (916-9/19), cultures also negative.  Chest CT (9/21) with decreased pleural effusions with continued prominent loculated component of right effusion and overall slight decrease in associated atelectasis.  Still with some ANAYA but no further hemoptysis since ~9/12.  Placed on continuous BiPAP 9/19-9/22 due to worsening hypercapnia and mentation, now improving.  Weaned to RA at rest on 9/24.  - Sputum culture still needs collection, defer pulmonary ABX at this time  - Supplemental O2 to keep >92%, IS  and Aerobika q1h w/a and encourage OOB during the day as much as able  - Continue to defer NIPPV, repeat VBG prn with change in mentation/clinical status  - Left chest tube to water seal (9/21), defer removal today  - CXR daily while chest tube remains, remains stable with small left basilar effusion (personally reviewed)     Immunosuppression:  - Tacrolimus 4.5 mg qAM / 4 mg qPM (increased 9/19).  Goal level 8-12.  Repeat level 9/26 (ordered).  - Azathioprine 100 mg daily (9/20, MMF stopped due to ongoing diarrhea)  - Prednisone 10 mg qAM / 7.5 mg qPM with next taper due 10/13 (not yet ordered), defer accelerated taper (with elevated BUN) at this time  Date AM dose (mg) PM dose (mg)   9/15/22 10 7.5   10/13/22 7.5 7.5   11/10/22 7.5 5   12/8/22 5 5   1/5/23 5 2.5      Prophylaxis:   - Dapsone qMWF for PJP ppx (resumed 9/19, monitor for worsening anemia; Bactrim stopped d/t hyperkalemia, will need to monitor for recurrence of anemia with dapsone; Pentamidine neb not tolerated on 9/7 after only 5 minutes d/t excessive coughing)  - VGCV (renally dose, do not recommend dosing to Cystatin C lab) for CMV ppx through 9/28, D+/R+, CMV (9/13) negative, monitor CMV every other week upon completion of VGCV     Positive DSA: Newly positive on 8/10 with DQB2 mfi 2155.   - Monitoring [id number], next due 10/5, see below for trend:  Date DQB2 Notes   8/10 2155     9/1 7033 Current peak   9/21 5390        EBV viremia: Low level (1374 on 9/7), not likely to be clinically significant.  - Follow EBV monthly (10/7, ordered)     Hypogammaglobulinemia: IgG adequate at time of transplant, repeat level low (336) on 7/28.  S/p IVIG 7/30, tolerated well.  IgG on 8/29 adequate at 672.   - Follow IgG monthly (9/29, ordered)     Other relevant problems being managed by the primary team:     Encephalopathy:  Tremors:   Presumed UTI: Noted 9/19 with confusion as well as tremor.  DDx including hypercapnia, hyperammonia, infection, uremia (see  below), medication related.  VBG with worsening hypercapnia (99).  BUN elevated (112).  Ammonia normal (24).  CRP normal, procal 0.1.  UA with moderate blood, large leukocyte esterase, 3 RBC, 29 WBC, and many bacteria.  Urine culture (9/19) with mixture of urogenital josue.  Tacrolimus levels recently subtherapeutic.  Head CT (9/20) without acute intracranial pathology.  - Blood culture (9/19) NGTD  - ABX: IV ceftriaxone (9/20) per primary team given concern for UTI     Likely CKD5 (in light of recent cystatin C): Nephrology consulted 9/21, see their note for details, with concern for CKD5 as Cr may suggest overestimation of kidney function with limited muscle mass, unclear if trend over the past week reflects BACILIO.  Cystatin C 4.3 with GFR 10.  Making urine.  Renal US (9/22) normal.  HD trial discussed with pt. and daughter on 9/22, pt. considering.  - Nephrology following, considering HD trial, tunneled line placement tentatively planned for 9/26     Diarrhea: C diff negative on 9/10 and 9/20.  Likely medication related (MMF), but unable to transition to Myfortic given NPO.  Loperamide utilized with some benefit.  Goal to titrate to antidiarrheal 3 stools daily, management per primary.  Enteric stool panel negative 9/16.  Transitioned from MMF to AZA as above.     Severe gastroparesis:   Pyloric ulcer: Gastric emptying study 7/20 with severe gastric emptying delay (95% retention at 4 hours), s/p GJ tube placement in IR 7/27.  S/p EGD (8/3) noted to have pyloric ulcer and excessive gastric fluid and residual food.  S/p EGD (8/11) with mild erythema 2/2 GJ tube trauma seen near insertion site but no active bleeding.  - PPI BID  - TF continuous and ALL enteral medications via J tube  - G tube to gravity drainage; full liquid diet for comfort only  - Repeat gastric emptying study to evaluate for ability to transition medications to PO and progress diet, tentatively planned for 9/26  - Repeat EGD 10/13 to check healing  "of ulcer, sooner if hgb declines further     We appreciate the excellent care provided by the [place] [id number] team.  Recommendations communicated via in person rounding and this note.  Will continue to follow along closely, please do not hesitate to call with any questions or concerns.     Patient discussed with Dr. Paredes.    Catherine Johnson, DNP, APRN, CNP  Inpatient Nurse Practitioner  Pulmonary CF/Transplant     Subjective & Interval History:     Weaned to RA at rest this morning, desat to 85% on RA with ambulation to the BR but able to recover quickly.  No new cough or sputum.  Left chest tube output stable.  Loose stools continue, less frequent than prior.  No N/V.    Review of Systems:     C: No fever, no chills, no change in weight, no change in appetite  INTEGUMENTARY/SKIN: No rash or obvious new lesions  ENT/MOUTH: No sore throat, no sinus pain, no nasal congestion or drainage  RESP: See interval history  CV: No chest pain, no palpitations, + peripheral edema, no orthopnea  GI: No nausea, no vomiting, + stable loose stools, no reflux symptoms  : No dysuria  MUSCULOSKELETAL: No myalgias, no arthralgias  ENDOCRINE: Blood sugars with adequate control  NEURO: + occ headache, no numbness or tingling  PSYCHIATRIC: Mood stable    Physical Exam:     All notes, images, and labs from past 24 hours (at minimum) were reviewed.    Vital signs:  Temp: 98.6  F (37  C) Temp src: Oral BP: 131/68 Pulse: 101   Resp: 18 SpO2: 94 % O2 Device: Nasal cannula Oxygen Delivery: 1 LPM Height: 158.8 cm (5' 2.5\") Weight: 69.6 kg (153 lb 6.4 oz)  I/O:     Intake/Output Summary (Last 24 hours) at 9/24/2022 0833  Last data filed at 9/24/2022 0600  Gross per 24 hour   Intake 1690 ml   Output 1725 ml   Net -35 ml     Constitutional: Sitting up in a chair, in no apparent distress.   HEENT: Eyes with pink conjunctivae, anicteric.  Oral mucosa moist without lesions.  PULM: Diminished bases bilaterally L>R.  No crackles, no rhonchi, no " wheezes.  Non-labored breathing on RA.  Left chest tube with occasional small air leak with cough, serous.  CV: Normal S1 and S2.  RRR.  + faint murmur, gallop, or rub.  Trace BLE edema L>R.   ABD: NABS, soft, nontender, nondistended.  PEG/J tube site not visualized.  MSK: Moves all extremities.  No apparent muscle wasting.   NEURO: Alert and oriented, conversant.  + mild tremors (minimal).  SKIN: Warm, dry.  No rash on limited exam.   PSYCH: Mood stable.     Lines, Drains, and Devices:  Peripheral IV 09/10/22 Anterior;Left Lower forearm (Active)   Site Assessment WDL 09/24/22 0000   Line Status Saline locked 09/24/22 0000   Dressing Intervention New dressing  09/10/22 0135   Phlebitis Scale 0-->no symptoms 09/24/22 0000   Infiltration Scale 0 09/24/22 0000   Number of days: 14     Data:     LABS    CMP:   Recent Labs   Lab 09/24/22  0615 09/24/22  0404 09/23/22  2147 09/23/22  1654 09/23/22  0943 09/23/22  0536 09/22/22  1149 09/22/22  0551 09/21/22  0843 09/21/22  0734 09/20/22  1012 09/20/22  0701 09/19/22  0817 09/19/22  0546     --   --   --   --  140  --  139  --  142  --  141  --  140   POTASSIUM 4.1  --   --   --   --  5.0  --  4.5  --  4.1  --  4.3  --  4.5   CHLORIDE 89*  --   --   --   --  87*  --  89*  --  89*  --  87*  --  89*   CO2 44*  --   --   --   --  50*  --  46*  --  48*  --  46*  --  47*   ANIONGAP 8  --   --   --   --  3*  --  4*  --  5*  --  8  --  4*   * 192* 177* 236*   < > 179*   < > 160*   < > 134*   < > 163*   < > 176*   BUN 99.5*  --   --   --   --  102.0*  --  106.0*  --  110.0*  --  112.0*  --  107.0*   CR 1.59*  --   --   --   --  1.63*  --  1.63*  --  1.63*  --  1.78*  --  1.78*   GFRESTIMATED 37*  --   --   --   --  36*  --  36*  --  36*  --  32*  --  32*   ESTUARDO 9.5  --   --   --   --  9.8  --  9.4  --  9.3  --  9.0  --  9.3   MAG 2.8*  --   --   --   --   --   --   --   --  2.9*  --  2.8*  --  2.7*   PHOS  --   --   --   --   --   --   --   --   --   --   --  4.0  --   3.9   PROTTOTAL 5.3*  --   --   --   --  5.6*  --  5.6*  --  5.6*  --  5.2*  --  5.7*   ALBUMIN 3.0*  --   --   --   --  3.3*  --  3.1*  --  3.2*  --  3.1*  --  3.2*   BILITOTAL <0.2  --   --   --   --  0.2  --  <0.2  --  <0.2  --  <0.2  --  <0.2   ALKPHOS 87  --   --   --   --  89  --  84  --  82  --  75  --  90   AST 17  --   --   --   --  25  --  18  --  16  --  15  --  13   ALT 10  --   --   --   --  12  --  <5*  --  9*  --  9*  --  6*    < > = values in this interval not displayed.     CBC:   Recent Labs   Lab 09/24/22  0615 09/23/22  0536 09/22/22  0551 09/21/22  0734   WBC 7.1 6.4 6.8 6.1   RBC 2.55* 2.55* 2.63* 2.51*   HGB 7.9* 8.0* 8.3* 7.9*   HCT 26.9* 26.8* 27.8* 26.4*   * 105* 106* 105*   MCH 31.0 31.4 31.6 31.5   MCHC 29.4* 29.9* 29.9* 29.9*   RDW 19.6* 19.3* 19.4* 19.3*    207 217 191       INR: No lab results found in last 7 days.    Glucose:   Recent Labs   Lab 09/24/22  0615 09/24/22  0404 09/23/22  2147 09/23/22  1654 09/23/22  0943 09/23/22  0536   * 192* 177* 236* 162* 179*       Blood Gas:   Recent Labs   Lab 09/22/22  0551 09/21/22  1648 09/21/22  1002   PHV 7.42 7.41 7.46*   PCO2V 82* 82* 72*   PO2V 26 24* 16*   HCO3V 53* 52* 51*   LINDY 23.8* 24.2* -1.7   O2PER 30 1 21       Culture Data No results for input(s): CULT in the last 168 hours.    Virology Data:   Lab Results   Component Value Date    FLUAH1 Not Detected 09/13/2022    FLUAH3 Not Detected 09/13/2022    EF3490 Not Detected 09/13/2022    IFLUB Not Detected 09/13/2022    RSVA Not Detected 09/13/2022    RSVB Not Detected 09/13/2022    PIV1 Not Detected 09/13/2022    PIV2 Not Detected 09/13/2022    PIV3 Not Detected 09/13/2022    HMPV Not Detected 09/13/2022       Historical CMV results (last 3 of prior testing):  Lab Results   Component Value Date    CMVQNT Not Detected 09/13/2022    CMVQNT Not Detected 09/07/2022    CMVQNT Not Detected 09/01/2022     No results found for: CMVLOG    Urine Studies    Recent Labs   Lab  Test 09/19/22  2254 08/01/22  0319 07/08/22  0831   URINEPH 7.0 8.0* 5.5   NITRITE Negative Negative Negative   LEUKEST Large* Negative Negative   WBCU 29*  --  1       Most Recent [place] Pulmonary Function Testing (FVC/FEV1 only)  FVC-Pre   Date Value Ref Range Status   09/07/2022 1.01 L    09/01/2022 1.07 L    08/24/2022 1.23 L    08/18/2022 1.33 L      FVC-%Pred-Pre   Date Value Ref Range Status   09/07/2022 33 %    09/01/2022 35 %    08/24/2022 40 %    08/18/2022 43 %      FEV1-Pre   Date Value Ref Range Status   09/07/2022 0.98 L    09/01/2022 1.02 L    08/24/2022 1.17 L    08/18/2022 1.22 L      FEV1-%Pred-Pre   Date Value Ref Range Status   09/07/2022 40 %    09/01/2022 42 %    08/24/2022 48 %    08/18/2022 50 %        IMAGING    Recent Results (from the past 48 hour(s))   XR Chest 2 Views    Narrative    EXAM: XR CHEST 2 VIEWS  9/22/2022 11:16 AM      HISTORY: interval follow up, chest tubes, lung transplant history    COMPARISON: Chest x-ray 9/21/2022    FINDINGS: PA and lateral radiographs of the chest. Postsurgical  changes of bilateral lung transplantation with clamshell sternotomy  wires. Stable positioning of bibasilar chest tubes. The trachea is  midline. The cardiac silhouette is stable. Small left pleural  effusion. Fluid within the right major and minor fissures. No  pneumothorax. Stable streaky perihilar and basilar opacities. The  visualized upper abdomen is unremarkable. Degenerative changes of the  spine.       Impression    IMPRESSION:   1. Small left pleural effusion.  2. Stable streaky perihilar and bibasilar opacities.  3. Postsurgical changes of bilateral lung transplantation with stable  positioning of bibasilar chest tubes.    I have personally reviewed the examination and initial interpretation  and I agree with the findings.    JOHANN MORAES MD         SYSTEM ID:  [id number]   US Renal Complete    Narrative    EXAMINATION: US RENAL COMPLETE, 9/22/2022 2:46 PM     COMPARISON:  None.    HISTORY:acute kidney injury.    TECHNIQUE: The kidneys and bladder were scanned in the standard  fashion with specialized ultrasound transducer(s) using both gray  scale and limited color/spectral Doppler techniques.    FINDINGS:    Right kidney: Measures 8.0 cm in length. Parenchyma is of normal  thickness and echogenicity. No focal mass. No hydronephrosis.    Left kidney: Measures 8.2 cm in length. Parenchyma is of normal  thickness and echogenicity. No focal mass. No hydronephrosis.     Bladder: Partially distended and within normal limits.      Impression    IMPRESSION: No hydronephrosis.    I have personally reviewed the examination and initial interpretation  and I agree with the findings.    NIKOS JAVED MD         SYSTEM ID:  [id number]   XR Chest 2 Views    Narrative    EXAM: XR CHEST 2 VIEWS  9/23/2022 9:01 AM      HISTORY: interval follow up, chest tubes, lung transplant history    COMPARISON: Chest x-ray 9/22/2022    FINDINGS: PA and lateral radiographs of the chest. Postsurgical  changes of bilateral lung transplantation with clamshell sternotomy  wires. Stable left basilar chest tube. Interval removal of right  basilar chest tube. The trachea is midline. The cardiac silhouette is  not enlarged. Small left and likely trace right pleural effusions. No  pneumothorax. Atherosclerotic calcifications of the aortic arch.  Stable perihilar and basilar opacities. Partially visualized  percutaneous gastrostomy tube.       Impression    IMPRESSION:   1. Small left and trace right pleural effusions.  2. Interval removal of right basilar chest tube. No pneumothorax.  3. Stable perihilar and basilar opacities.    I have personally reviewed the examination and initial interpretation  and I agree with the findings.    JOHANN MORAES MD         SYSTEM ID:  [id number]

## 2022-09-24 NOTE — PLAN OF CARE
HX:62 year old female with pertinent hx of end stage COPD s/p bilateral sequential lung transplant (6/22) on triple IS ( MMF/Tacro/pred), pyloric ulcer, recent ERCP c/f hemobilia, gastroparesis s/p GJ tube placement and Percutaneous J tube presents for a planned admission from transplant pulmonology to work up antibody medicated rejection versus infection.  Shift 15:00-23:30  Neuro: A&O x 4. Very pleasant.  Resp: Lungs CTA. O2 1lnc, sat alarm rings frequently.Some ANAYA.  Uses suction occasionally. L CT to H2O seal. LE edema, TEDs on.   CV: VSS, no tele. To have tunneled catheter for dialysis placed on Monday.   GI/: TF 40 ml/hr through J tube, G clamped. Ensure occasionally. All meds down the J tube. Elevated BG covered with sliding scale insulin. To have gastric emptying study. Small loose stools.  Pain: Oxycodone x 2 with relief. Icy hot patches to back.  Mobility: Up to commode with SBA. Up in chair x 2 hours.   Plan: Assist as needed. Med Gold 10 with issues.

## 2022-09-24 NOTE — PROGRESS NOTES
CLINICAL NUTRITION SERVICES     MD concerned about loose stools. Pt already receiving 24 g fiber daily from current TF regimen. Loose stools likely to be from liquid diet/TF.      INTERVENTIONS:  Implementation:  -Will add 1 pkt Banatrol BID (pt will now be receiving 28 g fiber/d)    Nutrition will continue to follow per protocol.    Julia Alva MS, RD, LD  [facility] RD pager: [phone]  Ascom: [id number]  Weekend/Holiday RD pager: [phone]

## 2022-09-24 NOTE — PROGRESS NOTES
Brief Nephrology Note:    Chart reviewed today - Creatinine appears stable today. Slight trend down in BUN. We still suspect that she would benefit from HD. Stopped at patient room do discuss and she was up in the chair. Continues to have some lower extremity swelling.    Plan:   -Appointment for tunneled HD line on Monday  -Will likely initiate dialysis afterwards      Madan Dave MD  Division of Renal Disease and Hypertension  [facility]  [email or web address]  Vocera Web Console

## 2022-09-24 NOTE — PROGRESS NOTES
"   09/24/22 0853   Quick Adds   Type of Visit Initial Occupational Therapy Evaluation   Living Environment   People in Home child(ray), adult   Current Living Arrangements house   Home Accessibility stairs to enter home;stairs within home   Number of Stairs, Main Entrance 1   Stair Railings, Main Entrance none   Number of Stairs, Within Home, Primary nine   Stair Railings, Within Home, Primary railings safe and in good condition   Transportation Anticipated family or friend will provide   Living Environment Comments Per PT note and pt confirmation, \"Per last admission - pt has lived with her adult son for the last 2 years, her home has mold in it which has not been appropraite for pt's lungs, per her report. Pt's son assists with household management/cleaning and makes some of the meals. Since last hospitalization, pt has been staying at [facility] with 24/7 assist, no stairs, w/c accessible. Plans to stay at [facility] until end of October.\" Pt then plans to live with daughter and two grandsons until IND.   Self-Care   Usual Activity Tolerance moderate   Current Activity Tolerance moderate   Regular Exercise No   Equipment Currently Used at Home shower chair;walker, rolling;wheelchair, manual  (4WW)   Fall history within last six months no   Activity/Exercise/Self-Care Comment Prior to transplant pt IND all ADL. Currently requiring assist to complete ADLs and manage tube feed bag.   Instrumental Activities of Daily Living (IADL)   Previous Responsibilities meal prep;housekeeping;laundry;shopping;medication management;finances;driving   IADL Comments Pt and daughter report pt was IND prior to transplant with above IADLs. Family currently managing medicaitons and finances. Pt desire to drive self to-from appointments   General Information   Onset of Illness/Injury or Date of Surgery 09/09/22   Referring Physician Abbe Abebe MD   Patient/Family Therapy Goal Statement (OT) IND dressing, toileting, bathing, g/hygiene, " "and tube feed management   Additional Occupational Profile Info/Pertinent History of Current Problem Per EMR, \"62 year old female with pertinent hx of end stage COPD s/p bilateral sequential lung transplant (6/22) on triple IS ( MMF/Tacro/pred), pyloric ulcer, recent ERCP c/f hemobilia, gastroparesis s/p GJ tube placement and Percutaneous J tube presents for a planned admission from transplant pulmonology to work up antibody medicated rejection versus infection\"   Limitations/Impairments safety/cognitive   Left Upper Extremity (Weight-bearing Status) full weight-bearing (FWB)   Right Upper Extremity (Weight-bearing Status) full weight-bearing (FWB)   Left Lower Extremity (Weight-bearing Status) full weight-bearing (FWB)   Right Lower Extremity (Weight-bearing Status) full weight-bearing (FWB)   General Observations and Info Activity: up ad magalie and L CT to water seal   Cognitive Screens/Assessments   Cognitive Assessments Completed [facility] Mental Status Exam (UMS):  Total Score out of /30 21   [facility] Norms 21-26 equals mild neurocognitive disorder   SLUMS Domains assessed: orientation, memory, attention, executive functions   SLUMS Interpretation Anticipate pt will safely perform ADL/IADLs with use of visual and written reminders. Discussed continueation of assist from family with medication management and finances.   Sensory   Sensory Quick Adds No deficits were identified   Pain Assessment   Patient Currently in Pain No   Integumentary/Edema   Integumentary/Edema no deficits were identifed   Posture   Posture forward head position;protracted shoulders   Range of Motion Comprehensive   Comment, General Range of Motion Observed BUEs WFL throughout session   Strength Comprehensive (MMT)   Comment, General Manual Muscle Testing (MMT) Assessment Observed BUEs WFL throughout session   Bed Mobility   Comment (Bed Mobility) CGA-min. A   Transfers   Transfer Comments CGA+FWW   Activities of Daily " Living   BADL Assessment/Intervention bathing;upper body dressing;toileting;grooming;lower body dressing   Bathing Assessment/Intervention   [place] Level (Bathing) minimum assist (75% patient effort)   Upper Body Dressing Assessment/Training   [place] Level (Upper Body Dressing) minimum assist (75% patient effort)   Lower Body Dressing Assessment/Training   [place] Level (Lower Body Dressing) minimum assist (75% patient effort)   Grooming Assessment/Training   [place] Level (Grooming) minimum assist (75% patient effort)   Toileting   [place] Level (Toileting) minimum assist (75% patient effort)   Clinical Impression   Criteria for Skilled Therapeutic Interventions Met (OT) Yes, treatment indicated   OT Diagnosis decreased ADL/IADL IND, cog deficits   OT Problem List-Impairments impacting ADL problems related to;activity tolerance impaired;cognition;mobility;strength   Assessment of Occupational Performance 3-5 Performance Deficits   Identified Performance Deficits dressing, bathing, toileting, g/hygiene, cognition   Planned Therapy Interventions (OT) ADL retraining;IADL retraining;cognition;strengthening;transfer training   Clinical Decision Making Complexity (OT) low complexity   Anticipated Equipment Needs Upon Discharge (OT)   (tbd)   Risk & Benefits of therapy have been explained evaluation/treatment results reviewed;care plan/treatment goals reviewed;risks/benefits reviewed;current/potential barriers reviewed;participants voiced agreement with care plan;participants included;patient;daughter   Clinical Impression Comments Pt will benefit from skilled IP OT to maximize functional IND and safety performing all ADL/IADLs prior to return home   OT Discharge Planning   OT Discharge Recommendation (DC Rec) home with assist;home with home care occupational therapy   OT Rationale for DC Rec Pt demonstrating below baseline ADL/IADL completion from IND. Will benefit from skilled IP OT to improve  functional strength and performance of self-cares prior to discharge home to [facility] with assist. Daughter works x3days/week 12hr shifts. Would benefit from HH OT to maximize safety, perform home modification evaluation to reduce fall risk and conserve energy.   OT Brief overview of current status CGA+FWW   Total Evaluation Time (Minutes)   Total Evaluation Time (Minutes) 21   OT Goals   Therapy Frequency (OT) 5 times/wk   OT Predicted Duration/Target Date for Goal Attainment 10/14/22   OT Goals Upper Body Dressing;Lower Body Dressing;Upper Body Bathing;Lower Body Bathing;Toilet Transfer/Toileting;Cognition;OT Goal 1   OT: Hygiene/Grooming independent   OT: Upper Body Dressing Independent   OT: Lower Body Dressing Independent   OT: Upper Body Bathing Independent   OT: Lower Body Bathing Independent   OT: Transfer Independent   OT: Toilet Transfer/Toileting Independent   OT: Cognitive Patient/caregiver will verbalize understanding of cognitive assessment results/recommendations as needed for safe discharge planning   OT: Goal 1 Pt will demsontrate IND performance and improved  strength in B hands through HEP participation prior to discharge to maximize IND managing tube feeds as needed.

## 2022-09-24 NOTE — PROGRESS NOTES
[facility]    Medicine Progress Note - Hospitalist Service, GOLD TEAM 10    Date of Admission:  9/9/2022    Assessment & Plan        62 year old female with pertinent hx of end stage COPD s/p bilateral sequential lung transplant (6/22) on triple IS ( MMF/Tacro/pred), pyloric ulcer, recent ERCP c/f hemobilia, gastroparesis s/p GJ tube placement and Percutaneous J tube presents for a planned admission from transplant pulmonology to work up antibody medicated rejection versus infection.     1.  Diarrhea likely secondary to tube feeding, not present on admission  The patient was repeatedly ruled out for C. difficile colitis.  I consulted with adult dietitian to assist with management of diarrhea related to tube feeding.  The recommendation was to increase the fiber stool 28 g of fiber per day in order to bulk up the stool and decrease dairy.    2.  Acute kidney injury AKIN stage II on CKD stage IIIb, all are present on admission  We will continue to monitor kidney function daily with strict input and output and daily body weight.  The patient has been ruled out for any obstructive renal pathology with a renal ultrasound dated 9/22/2022.Valganciclovir is currently adjusted to the kidney function  If there is no improvement in the patient kidney function plan for tunneled catheter placement by interventional radiology on 9/26/2022 with consideration for dialysis on that same day.     3.  Acute hypoxic hypercarbic respiratory failure secondary to bibasilar pleural effusion currently with chest tubes bilaterally on top of end-stage COPD s/p bilateral sequential lung transplant on 6/22/2022 complicated by chronic respiratory acidosis with compensation by metabolic alkalosis, all are present on admission   This is the main problem that led to this admission.  The patient has bilateral chest tubes.  The patient had her right-sided chest tube removed on 9/22/2022.  -Continue daily  chest x-ray  -The patient is being considered for left-sided chest tube removal on 9/25  -DSA testing on 10/5/2022  -IgG level ordered for 9/29  -Vibha-Barr virus quantitative was ordered for 10/7/2022  -Continue 3 drug immunosuppression with azathioprine, prednisone, and tacrolimus  -Prednisone taper to be performed on 10/13/2022  -Continue BiPAP as detailed above  -Continue valganciclovir for cytomegalovirus prophylaxis through 9/28/2022  -Continue dapsone for PJP prophylaxis  -Continue folic acid while on dapsone  -Continue nystatin.  Prophylaxis protocol post lung transplant  -I highly appreciate input of transplant pulmonology service     4.  Significant delayed gastric emptying s/p gastrojejunostomy tube placement, all are present on admission  We will continue to utilize jejunal tube for nutrition.  I ordered a follow-up nuclear medicine gastric emptying study for evaluation of any hopefully improvement in the patient's gastric emptying.  This can also be performed as outpatient once the patient is ready for discharge.  However, I am optimistic that this can happen on 9/26/2022.    5.  Resolved medical problems  #Acute on chronic heart failure with preserved ejection fraction LVEF 65%, the patient was appropriately diuresed  #Steroid-induced hyperglycemia and diabetes mellitus, continue insulin Lantus with insulin NovoLog sliding scale  # Acute blood loss anemia secondary to pyloric ulcer on top of chronic anemia of chronic disease, all are present on admission  -Continue pantoprazole 40 mg twice daily  -Repeat endoscopy for October 2022  #Acute metabolic encephalopathy secondary to urinary tract infection, not clear if present on admission, ordered a total of 5-day course of ceftriaxone for which the patient encephalopathy resolved     6.  Chronic medical problems   # extra hepatic and intrahepatic biliary dilation c/f hemobilia   # Intra ductal papillary mucinous neoplasm   ERCP 8/11 showed hemobilia.   -  Monitor LFTs      #CKD stage III with superimposed BACILIO (likely 2/2 recent diuresis)  Patient has been variable GFRs 30-50s in the last few months. Most recent Cr trend 1.5-1.9 1.56 9/8/22.   - CTM      #Severe gastroparesis s/p  GJ tube placement 7/27/2022  - RD nutrition consult placed for TF  - Percutaneous j tube in place with G venting to gravity      #HTN  # A flutter with RVR/SVT   . Has recently completed zio patch.  - Continue PTA metoprolol 50 mg BID    #Diarrhea with plan to trial transition to Myfortic. Resume imodium for now      # LLE swelling check US r/o DVT             Diet: Full Liquid Diet  NPO per Anesthesia Guidelines for Procedure/Surgery Except for: Meds  Snacks/Supplements Adult: Nepro Oral Supplement; Between Meals  Adult Formula Drip Feeding: Continuous Nepro with Carbsteady; Jejunostomy; Goal Rate: 40 ml/hr--okay to begin at goal once new formula arrives on unit.; mL/hr; Medication - Feeding Tube Flush Frequency: At least 15-30 mL water before and after med...    DVT Prophylaxis: Heparin SQ  Dong Catheter: Not present  Central Lines: None  Cardiac Monitoring: None  Code Status: Full Code      Disposition Plan      Expected Discharge Date: 09/30/2022        Discharge Comments: awaiting removal of left-sided chest tube and initiation of dialysis prior to discharge TBD     The patient's care was discussed with the Patient, Patient's Family and Pulmonary Consultant.    Abbe Abebe MD  Hospitalist Service, GOLD TEAM 10  [facility]  Securely message with the Vocera Web Console (learn more here)  Text page via [facility] Paging/Directory   Please see signed in provider for up to date coverage information      Clinically Significant Risk Factors Present on Admission                      ______________________________________________________________________    Interval History   The patient reported continued diarrhea.  No reported nausea or  vomiting.  Four-point review of systems is otherwise negative.    Data reviewed today: I reviewed all medications, new labs and imaging results over the last 24 hours.     Physical Exam   Vital Signs: Temp: 98.3  F (36.8  C) Temp src: Oral BP: 125/75 Pulse: 92   Resp: 18 SpO2: 94 % O2 Device: None (Room air) Oxygen Delivery: 1/2 LPM  Weight: 153 lbs 6.4 oz  Constitutional: Awake, alert, cooperative, no apparent distress.  Eyes: Conjunctiva and pupils examined and normal.  HEENT: Moist mucous membranes, normal dentition.  Respiratory: Clear to auscultation bilaterally, no crackles or wheezing.  Cardiovascular: Regular rate and rhythm, normal S1 and S2, and no murmur noted.  GI: Soft, non-distended, non-tender, normal bowel sounds.  Lymph/Hematologic: No anterior cervical or supraclavicular adenopathy.  Skin: No rashes, no cyanosis, no edema.  Musculoskeletal: No joint swelling, erythema or tenderness.  Neurologic: Cranial nerves 2-12 intact, normal strength and sensation.  Psychiatric: Alert, oriented to person, place and time, no obvious anxiety or depression.  Data   Recent Labs   Lab 09/24/22  1026 09/24/22  0615 09/24/22  0404 09/23/22  0943 09/23/22  0536 09/22/22  1149 09/22/22  0551   WBC  --  7.1  --   --  6.4  --  6.8   HGB  --  7.9*  --   --  8.0*  --  8.3*   MCV  --  106*  --   --  105*  --  106*   PLT  --  170  --   --  207  --  217   NA  --  141  --   --  140  --  139   POTASSIUM  --  4.1  --   --  5.0  --  4.5   CHLORIDE  --  89*  --   --  87*  --  89*   CO2  --  44*  --   --  50*  --  46*   BUN  --  99.5*  --   --  102.0*  --  106.0*   CR  --  1.59*  --   --  1.63*  --  1.63*   ANIONGAP  --  8  --   --  3*  --  4*   ESTUARDO  --  9.5  --   --  9.8  --  9.4   * 163* 192*   < > 179*   < > 160*   ALBUMIN  --  3.0*  --   --  3.3*  --  3.1*   PROTTOTAL  --  5.3*  --   --  5.6*  --  5.6*   BILITOTAL  --  <0.2  --   --  0.2  --  <0.2   ALKPHOS  --  87  --   --  89  --  84   ALT  --  10  --   --  12  --  <5*    AST  --  17  --   --  25  --  18    < > = values in this interval not displayed.     Recent Results (from the past 24 hour(s))   XR Chest 2 Views    Narrative    Exam: XR CHEST 2 VIEWS, 9/24/2022 9:53 AM    Indication: interval follow up, chest tubes, lung transplant history    Comparison: 9/23/2022, 9/21/2022    Findings:   Surgical changes of bilateral lung transplantation with clamshell  sternotomy wires and mediastinal surgical clips. Stable left pigtail  chest tube. Stable bilateral loculated pleural effusions, left greater  than right. No appreciable pneumothorax with resolution of pleural air  on the left. Stable mild streaky perihilar and medial bibasilar  opacities.      Impression    Impression: No significant change in small left greater than right  loculated pleural effusions with left chest tube in place. No  pneumothorax.    CECI REGAN DO         SYSTEM ID:  [id number]     Medications     dextrose       - MEDICATION INSTRUCTIONS -       - MEDICATION INSTRUCTIONS -         azaTHIOprine  100 mg Per J Tube Daily     banatrol plus  1 packet Per Feeding Tube BID     calcium carbonate 600 mg-vitamin D 400 units  1 tablet Per J Tube BID w/meals     cyanocobalamin  500 mcg Per Feeding Tube Daily     dapsone  50 mg Per J Tube Q Mon Wed Fri AM     folic acid  1 mg Oral or Feeding Tube Daily     gabapentin  100 mg Oral At Bedtime     heparin ANTICOAGULANT  5,000 Units Subcutaneous Q12H     insulin aspart  1-6 Units Subcutaneous Q6H     insulin glargine  6 Units Subcutaneous QAM AC     lidocaine  2 patch Transdermal Q24h    And     lidocaine   Transdermal Q8H TONY     menthol  1 patch Topical QAM    And     menthol   Transdermal Q8H     menthol   Transdermal Daily     metoprolol  12.5 mg Per J Tube BID     multivitamins w/minerals  15 mL Per Feeding Tube Daily     nystatin  1,000,000 Units Swish & Swallow 4x Daily     pantoprazole  40 mg Per J Tube BID     prednisoLONE  10 mg Per J Tube Daily     prednisoLONE   7.5 mg Per J Tube QPM     sodium chloride (PF)  3 mL Intracatheter Q8H     tacrolimus  4 mg Per J Tube QPM     tacrolimus  4.5 mg Per J Tube QAM     valGANciclovir  450 mg Oral or Feeding Tube Once per day on Mon Wed Fri

## 2022-09-25 ENCOUNTER — APPOINTMENT (OUTPATIENT)
Dept: PHYSICAL THERAPY | Facility: CLINIC | Age: 62
DRG: 205 | End: 2022-09-25
Attending: INTERNAL MEDICINE
Payer: MEDICARE

## 2022-09-25 ENCOUNTER — APPOINTMENT (OUTPATIENT)
Dept: GENERAL RADIOLOGY | Facility: CLINIC | Age: 62
DRG: 205 | End: 2022-09-25
Attending: PHYSICIAN ASSISTANT
Payer: MEDICARE

## 2022-09-25 LAB
CYSTATIN C (ROCHE): 4.3 MG/L (ref 0.6–1)
GFR SERPL CREATININE-BSD FRML MDRD: 10 ML/MIN/1.73M2
GLUCOSE BLDC GLUCOMTR-MCNC: 159 MG/DL (ref 70–99)
GLUCOSE BLDC GLUCOMTR-MCNC: 165 MG/DL (ref 70–99)
GLUCOSE BLDC GLUCOMTR-MCNC: 195 MG/DL (ref 70–99)
GLUCOSE BLDC GLUCOMTR-MCNC: 199 MG/DL (ref 70–99)
HOLD SPECIMEN: NORMAL
MAGNESIUM SERPL-MCNC: 2.7 MG/DL (ref 1.7–2.3)

## 2022-09-25 PROCEDURE — 214N000001 HC R&B CCU UMMC

## 2022-09-25 PROCEDURE — 97530 THERAPEUTIC ACTIVITIES: CPT | Mod: GP

## 2022-09-25 PROCEDURE — 71046 X-RAY EXAM CHEST 2 VIEWS: CPT | Mod: 26 | Performed by: RADIOLOGY

## 2022-09-25 PROCEDURE — 99233 SBSQ HOSP IP/OBS HIGH 50: CPT | Mod: 24 | Performed by: NURSE PRACTITIONER

## 2022-09-25 PROCEDURE — 83735 ASSAY OF MAGNESIUM: CPT | Performed by: INTERNAL MEDICINE

## 2022-09-25 PROCEDURE — 250N000011 HC RX IP 250 OP 636: Performed by: STUDENT IN AN ORGANIZED HEALTH CARE EDUCATION/TRAINING PROGRAM

## 2022-09-25 PROCEDURE — 250N000012 HC RX MED GY IP 250 OP 636 PS 637: Performed by: NURSE PRACTITIONER

## 2022-09-25 PROCEDURE — 250N000009 HC RX 250: Performed by: INTERNAL MEDICINE

## 2022-09-25 PROCEDURE — 36415 COLL VENOUS BLD VENIPUNCTURE: CPT | Performed by: NURSE PRACTITIONER

## 2022-09-25 PROCEDURE — 71046 X-RAY EXAM CHEST 2 VIEWS: CPT

## 2022-09-25 PROCEDURE — 99233 SBSQ HOSP IP/OBS HIGH 50: CPT | Performed by: INTERNAL MEDICINE

## 2022-09-25 PROCEDURE — 97116 GAIT TRAINING THERAPY: CPT | Mod: GP

## 2022-09-25 PROCEDURE — 250N000013 HC RX MED GY IP 250 OP 250 PS 637: Performed by: NURSE PRACTITIONER

## 2022-09-25 PROCEDURE — 82610 CYSTATIN C: CPT | Performed by: NURSE PRACTITIONER

## 2022-09-25 PROCEDURE — 250N000013 HC RX MED GY IP 250 OP 250 PS 637: Performed by: STUDENT IN AN ORGANIZED HEALTH CARE EDUCATION/TRAINING PROGRAM

## 2022-09-25 PROCEDURE — 250N000013 HC RX MED GY IP 250 OP 250 PS 637: Performed by: INTERNAL MEDICINE

## 2022-09-25 PROCEDURE — 250N000013 HC RX MED GY IP 250 OP 250 PS 637

## 2022-09-25 RX ORDER — METHOCARBAMOL 500 MG/1
500 TABLET, FILM COATED ORAL 3 TIMES DAILY PRN
Status: DISCONTINUED | OUTPATIENT
Start: 2022-09-25 | End: 2022-10-08 | Stop reason: HOSPADM

## 2022-09-25 RX ORDER — GABAPENTIN 250 MG/5ML
200 SOLUTION ORAL AT BEDTIME
Status: DISCONTINUED | OUTPATIENT
Start: 2022-09-25 | End: 2022-10-08 | Stop reason: HOSPADM

## 2022-09-25 RX ADMIN — INSULIN ASPART 1 UNITS: 100 INJECTION, SOLUTION INTRAVENOUS; SUBCUTANEOUS at 05:37

## 2022-09-25 RX ADMIN — NYSTATIN 1000000 UNITS: 100000 SUSPENSION ORAL at 08:58

## 2022-09-25 RX ADMIN — MULTIVITAMIN 15 ML: LIQUID ORAL at 08:58

## 2022-09-25 RX ADMIN — NYSTATIN 1000000 UNITS: 100000 SUSPENSION ORAL at 16:21

## 2022-09-25 RX ADMIN — FOLIC ACID 1 MG: 1 TABLET ORAL at 08:55

## 2022-09-25 RX ADMIN — PREDNISOLONE 10 MG: 15 SOLUTION ORAL at 08:56

## 2022-09-25 RX ADMIN — OXYCODONE HYDROCHLORIDE 5 MG: 5 SOLUTION ORAL at 22:29

## 2022-09-25 RX ADMIN — Medication 100 MG: at 08:57

## 2022-09-25 RX ADMIN — INSULIN ASPART 1 UNITS: 100 INJECTION, SOLUTION INTRAVENOUS; SUBCUTANEOUS at 22:38

## 2022-09-25 RX ADMIN — INSULIN ASPART 2 UNITS: 100 INJECTION, SOLUTION INTRAVENOUS; SUBCUTANEOUS at 16:28

## 2022-09-25 RX ADMIN — ORAL VEHICLES - SUSP 12.5 MG: SUSPENSION at 19:45

## 2022-09-25 RX ADMIN — OXYCODONE HYDROCHLORIDE 5 MG: 5 SOLUTION ORAL at 05:41

## 2022-09-25 RX ADMIN — CALCIUM CARBONATE 600 MG (1,500 MG)-VITAMIN D3 400 UNIT TABLET 1 TABLET: at 08:56

## 2022-09-25 RX ADMIN — Medication 40 MG: at 19:45

## 2022-09-25 RX ADMIN — NYSTATIN 1000000 UNITS: 100000 SUSPENSION ORAL at 19:46

## 2022-09-25 RX ADMIN — OXYCODONE HYDROCHLORIDE 5 MG: 5 SOLUTION ORAL at 01:02

## 2022-09-25 RX ADMIN — Medication 1 PACKET: at 19:44

## 2022-09-25 RX ADMIN — CALCIUM CARBONATE 600 MG (1,500 MG)-VITAMIN D3 400 UNIT TABLET 1 TABLET: at 18:02

## 2022-09-25 RX ADMIN — INSULIN ASPART 2 UNITS: 100 INJECTION, SOLUTION INTRAVENOUS; SUBCUTANEOUS at 09:34

## 2022-09-25 RX ADMIN — INSULIN GLARGINE 6 UNITS: 100 INJECTION, SOLUTION SUBCUTANEOUS at 08:59

## 2022-09-25 RX ADMIN — HEPARIN SODIUM 5000 UNITS: 5000 INJECTION, SOLUTION INTRAVENOUS; SUBCUTANEOUS at 19:45

## 2022-09-25 RX ADMIN — GABAPENTIN 200 MG: 250 SOLUTION ORAL at 22:28

## 2022-09-25 RX ADMIN — Medication 40 MG: at 08:56

## 2022-09-25 RX ADMIN — LOPERAMIDE HYDROCHLORIDE 2 MG: 2 CAPSULE ORAL at 05:33

## 2022-09-25 RX ADMIN — Medication 1 PACKET: at 08:59

## 2022-09-25 RX ADMIN — OXYCODONE HYDROCHLORIDE 5 MG: 5 SOLUTION ORAL at 09:39

## 2022-09-25 RX ADMIN — HEPARIN SODIUM 5000 UNITS: 5000 INJECTION, SOLUTION INTRAVENOUS; SUBCUTANEOUS at 08:58

## 2022-09-25 RX ADMIN — MENTHOL 1 PATCH: 205.5 PATCH TOPICAL at 08:58

## 2022-09-25 RX ADMIN — METHOCARBAMOL 500 MG: 500 TABLET ORAL at 19:41

## 2022-09-25 RX ADMIN — LOPERAMIDE HYDROCHLORIDE 2 MG: 2 CAPSULE ORAL at 13:09

## 2022-09-25 RX ADMIN — METHOCARBAMOL 500 MG: 500 TABLET ORAL at 13:09

## 2022-09-25 RX ADMIN — PREDNISOLONE 7.5 MG: 15 SOLUTION ORAL at 22:28

## 2022-09-25 RX ADMIN — TACROLIMUS 4 MG: 5 CAPSULE ORAL at 18:02

## 2022-09-25 RX ADMIN — ORAL VEHICLES - SUSP 12.5 MG: SUSPENSION at 08:57

## 2022-09-25 RX ADMIN — TACROLIMUS 4.5 MG: 5 CAPSULE ORAL at 08:57

## 2022-09-25 RX ADMIN — CYANOCOBALAMIN TAB 500 MCG 500 MCG: 500 TAB at 08:56

## 2022-09-25 ASSESSMENT — ACTIVITIES OF DAILY LIVING (ADL)
ADLS_ACUITY_SCORE: 34
ADLS_ACUITY_SCORE: 31
ADLS_ACUITY_SCORE: 34
ADLS_ACUITY_SCORE: 31
ADLS_ACUITY_SCORE: 31
ADLS_ACUITY_SCORE: 34
ADLS_ACUITY_SCORE: 34
ADLS_ACUITY_SCORE: 31
ADLS_ACUITY_SCORE: 34
ADLS_ACUITY_SCORE: 34

## 2022-09-25 NOTE — PLAN OF CARE
Temp: 98.9  F (37.2  C) Temp src: Oral BP: 130/71 Pulse: 98   Resp: 18 SpO2: 95 % O2 Device: Nasal cannula with humidification Oxygen Delivery: 1/2 LPM     Shift: 4145-4840  D: Admitted 9/9 with persistent ANAYA, daily AM hemoptysis, and increased BLE edema. Also noted to have persistent, increased bilateral pleural effusions, diffuse bilateral groundglass changes, and more focal appearing LLL infiltrates on imaging.   PMH: s/p BSLT (6/28/22) for COPD complicated by persistent bilateral pleural effusions, persistent CO2 retention, right hemidiaphragm palsy, BACILIO (dialysis 7/14-7/16), C. diff colitis, gastroparesis s/p GJ tube placement (7/27/22), GI bleed 2/2 pyloric ulcer, hemobilia s/p ERCP and MRCP (8/22), and persistent vasoplegia. Other history notable for HFpEF, NTM colonoization, hepatitis C, and methamphetamine use.      I: Monitored vitals and assessed pt status.   PRN: Oxycodone 5 mg x3; Imodium x1; Icy hot patch & lido patches on  Mobility: SBA    A:  Neuro: A&O x4. Tremors present in BUE. Calls appropriately.  Cardiac: No tele order. Afebrile. Denies chest pain, palpable pulses.  Resp: VSS on 1/2 L NC sating > 95%. No BiPAP overnight   GI/: Adequate urine output. x1 loose/watery stool during shift. Full liquid diet, continuous TF @ 40 mL/hr via J tube. BG q6hr w/ sliding scale.   Skin: No new deficits noted  LDA's: L PIV in place SL. L pigtail CT to water seal. GJ tube  Pain: CT incision pain, managed well with prn oxycodone, icy hot patch & lido patches.  AM weight: 155 lbs 1.6 oz    Plan: Continue to monitor and follow POC. Notify Gold 10 with any changes.

## 2022-09-25 NOTE — PROGRESS NOTES
"Brief Hospitalist Cross Cover Note     I was contacted by:  RIYA Crespo     Issue / Concern:  NEW / CHANGING SYMPTOM(S) - breakthrough pain     Material reviewed in chart:    recent progress notes     Subjective:  Having breakthrough pain - already on oxycodone and lidocaine patch and ICYHOT patch     /66 (BP Location: Left arm, Cuff Size: Adult Regular)   Pulse 101   Temp 98.9  F (37.2  C) (Oral)   Resp 18   Ht 1.588 m (5' 2.5\")   Wt 69.6 kg (153 lb 6.4 oz)   SpO2 90%   BMI 27.61 kg/m       Not otherwise examined     Assessment/Plan:    One time oxycodone 5-10mg PRN now    Continue close observation and monitoring     Jcarlos Myles MD  "

## 2022-09-25 NOTE — PLAN OF CARE
Goal Outcome Evaluation:      HX:62 year old female with pertinent hx of end stage COPD s/p bilateral sequential lung transplant (6/22) on triple IS ( MMF/Tacro/pred), pyloric ulcer, recent ERCP c/f hemobilia, gastroparesis s/p GJ tube placement and Percutaneous J tube presents for a planned admission from transplant pulmonology to work up antibody medicated rejection versus infection     Cardiac:no monitor, RRR  VS:VSS  IV:PIV-SL  Tubes:pigtail CT in left posterior chest,CT with airleak for NP and MD. Placed to -20 suction, clamped up to insertion point with no leak (airleak assumed within patient chest). No airleak noted when patient in bed lying on back. G/J tube in abd for TF. G/J tube dressing changed, scant green/brown drainage on dressing.    Neuro:A/Ox4, forgetful, ST memory deficit  Resp:ANAYA, lungs clear, decreased LLL. RA sats 90-91%. Denies shortness of breath at rest. Reminded to use IS.   GI/:Voiding, loose diarrhea.  Nutrition:Full liquids, protein drink and jello, plus water during the shift. TF Nepro carb steady at 40ml/hour.   Endo:FSG covered with sliding scale, every 6 hours. Lantus at night.   Skin:Abd and forearm bruising, post CT site on right C/D, cleaned with CHG swab, open to air. Posterior CT on left with gauze/tegaderm, dressing-changed.   Activity:Up to chair this AM and PM, ambulated to bathroom 4 times this shift, and in halls with therapy.  Pain:C/O CT site pain. Tube reinforced during dressing change.Treated with prn robaxin and oxycodone, Social:daughter present all shift.  Plan:monitor CT for airleak and output (change dressing and use vaseline gauze around tube insertion site if airleak returns), encourage good coughing and IS use, assess and treat for pain as indicated, encourage increased activity and participation in cares. NPO after MN for tunneled dialysis catheter placement. Gastric emptying study this week. Continue current caress and notify providers with questions and  concerns. CT will hopefully be pulled tomorrow.

## 2022-09-25 NOTE — PROGRESS NOTES
Pulmonary Medicine  Cystic Fibrosis - Lung Transplant Team  Progress Note  2022       Patient: Sofie Rodriguez  MRN: [id number]  : 1960 (age 62 year old)  Transplant: 2022 (Lung), POD#89  Admission date: 2022    Assessment & Plan:     Sofie Rodriguez is a 62-year-old female s/p BSLT (22) for COPD complicated by persistent bilateral pleural effusions, persistent CO2 retention, right hemidiaphragm palsy, BACILIO (dialysis -), C. diff colitis, gastroparesis s/p GJ tube placement (22), GI bleed 2/2 pyloric ulcer, hemobilia s/p ERCP and MRCP (), and persistent vasoplegia.  Other history notable for HFpEF, NTM colonoization, hepatitis C, and methamphetamine use.  Patient admitted 22 with persistent dyspnea (increased with activity), daily morning hemoptysis, and increased BLE edema.  Also noted to have persistent, increased bilateral pleural effusions, diffuse bilateral groundglass changes, and more focal appearing LLL infiltrates on imaging.  Treating with aggressive diuresis with some improvement in symptoms and hypoxia.  Hemoptysis resolved, mild hypoxia and ANAYA persisting.  S/p right pigtail chest tube and aspiration of left pleural effusion () with subsequent pigtail chest tube (9/15) with lytic therapy.  Output and imaging relatively stable.  Worsening mentation and tremors noted .  Also, with worsening hypercapnia, treated with NIPPV for several days.  AMS resolved, likely d/t ABX for UTI as elevated BUN has not changed, additional workup unremarkable.     Today's recommendations:  - Continue to defer NIPPV, repeat VBG prn with change in mentation/clinical status  - CXR daily while chest tube remains, today remains stable in the morning but chest tube with continuous air leak, defer removal today  - Tacrolimus level ordered   - Prednisone taper due 10/13  - VGCV for CMV ppx through  (do not recommend dosing to Cystatin C lab), monitor CMV every other  week upon completion of VGCV  - DSA next due 10/5  - EBV ordered 10/7, IgG ordered 9/29  - Tunneled line placement tentatively planned for 9/26  - Repeat gastric emptying study to evaluate for ability to transition medications to PO and progress diet, likely 9/26  - Repeat EGD 10/13 to check healing of ulcer, sooner if hgb declines further     S/p bilateral sequential lung transplant (BSLT) for end stage COPD:  Acute hypoxia with chronic hypercapnia:   Pulmonary edema:  Persistent bibasilar pleural effusions:   Right hemidiaphragm palsy: Admitted with increased dyspnea with minimal exertion and small volume daily hemoptysis.  Also with marked decline in PFTs (FEV1 1.22L, 50% on 8/18 to 0.98L, 40% on 9/7).  Chest CT (9/8) with increased effusions and groundglass changes.  DSA positive but stable (as below).  BNP markedly elevated (30k) and also with increased BLE edema.  Etiology unclear and is likely multi-factorial with DDx including pulmonary edema from hypervolemia/progressive HFpEF, rejection (ACR +/- AMR), alveolar hemorrhage, and/or infection.  Aggressively diuresed with some improvement in symptoms.  Bronchoscopy (9/13) unremarkable, BAL of lingula sent, cultures no growth (GPC on gram stain only).  S/p right pigtail chest tube placement (9/13-9/22), transudative with moderate output initially but quickly decreasing, cultures negative.  S/p aspiration of left pleural effusion (9/13) and then pigtail chest tube (9/15) s/p full lytic course (916-9/19), cultures also negative.  Chest CT (9/21) with decreased pleural effusions with continued prominent loculated component of right effusion and overall slight decrease in associated atelectasis.  Still with some ANAYA but no further hemoptysis since ~9/12.  Placed on continuous BiPAP 9/19-9/22 due to worsening hypercapnia and mentation, now improving.  Unable to collect sputum sample, respiratory ABX coverage deferred.  Weaned to RA at rest on 9/24.  - Sputum  culture still needs collection, defer pulmonary ABX at this time  - Supplemental O2 to keep >92%, IS and Aerobika q1h w/a and encourage OOB during the day as much as able  - Continue to defer NIPPV, repeat VBG prn with change in mentation/clinical status  - Left chest tube to water seal (9/21), defer removal today  - CXR daily while chest tube remains, remains stable with small left basilar effusion (personally reviewed) but chest tube with continuous air leak today (new)     Immunosuppression:  - Tacrolimus 4.5 mg qAM / 4 mg qPM (increased 9/19).  Goal level 8-12.  Repeat level 9/26 (ordered).  - Azathioprine 100 mg daily (9/20, MMF stopped due to ongoing diarrhea)  - Prednisone 10 mg qAM / 7.5 mg qPM with next taper due 10/13 (not yet ordered), defer accelerated taper (with elevated BUN) at this time  Date AM dose (mg) PM dose (mg)   9/15/22 10 7.5   10/13/22 7.5 7.5   11/10/22 7.5 5   12/8/22 5 5   1/5/23 5 2.5      Prophylaxis:   - Dapsone qMWF for PJP ppx (resumed 9/19, monitor for worsening anemia; Bactrim stopped d/t hyperkalemia, will need to monitor for recurrence of anemia with dapsone; Pentamidine neb not tolerated on 9/7 after only 5 minutes d/t excessive coughing)  - VGCV (renally dose, do not recommend dosing to Cystatin C lab) for CMV ppx through 9/28, D+/R+, CMV (9/13) negative, monitor CMV every other week upon completion of VGCV     Positive DSA: Newly positive on 8/10 with DQB2 mfi 2155.   - Monitoring [id number], next due 10/5, see below for trend:  Date DQB2 Notes   8/10 2155     9/1 7033 Current peak   9/21 5390        EBV viremia: Low level (1374 on 9/7), not likely to be clinically significant.  - Follow EBV monthly (10/7, ordered)     Hypogammaglobulinemia: IgG adequate at time of transplant, repeat level low (336) on 7/28.  S/p IVIG 7/30, tolerated well.  IgG on 8/29 adequate at 672.   - Follow IgG monthly (9/29, ordered)     Other relevant problems being managed by the primary  team:     Encephalopathy:  Tremors:   Presumed UTI: Noted 9/19 with confusion as well as tremor.  DDx including hypercapnia, hyperammonia, infection, uremia (see below), medication related.  VBG with worsening hypercapnia (99).  BUN elevated (112).  Ammonia normal (24).  CRP normal, procal 0.1.  UA with moderate blood, large leukocyte esterase, 3 RBC, 29 WBC, and many bacteria.  Urine culture (9/19) with mixture of urogenital josue.  Tacrolimus levels recently subtherapeutic.  Head CT (9/20) without acute intracranial pathology.  Blood culture (9/19) NGTD.  AMS resolved ~9/22.  S/p ceftriaxone 5-day course (9/20-9/24).     Likely CKD5 (in light of recent cystatin C): Nephrology consulted 9/21, see their note for details, with concern for CKD5 as Cr may suggest overestimation of kidney function with limited muscle mass, unclear if trend over the past week reflects BACILIO.  Cystatin C 4.3 with GFR 10.  Making urine.  Renal US (9/22) normal.  HD trial discussed with pt. and daughter on 9/22, pt. considering.  - Nephrology following, considering HD trial, tunneled line placement tentatively planned for 9/26     Diarrhea: C diff negative on 9/10 and 9/20.  Likely medication related (MMF), but unable to transition to Myfortic given NPO.  Loperamide utilized with some benefit.  Goal to titrate to antidiarrheal 3 stools daily, management per primary.  Enteric stool panel negative 9/16.  Transitioned from MMF to AZA as above.     Severe gastroparesis:   Pyloric ulcer: Gastric emptying study 7/20 with severe gastric emptying delay (95% retention at 4 hours), s/p GJ tube placement in IR 7/27.  S/p EGD (8/3) noted to have pyloric ulcer and excessive gastric fluid and residual food.  S/p EGD (8/11) with mild erythema 2/2 GJ tube trauma seen near insertion site but no active bleeding.  - PPI BID  - TF continuous and ALL enteral medications via J tube  - G tube to gravity drainage; full liquid diet for comfort only  - Repeat gastric  "emptying study to evaluate for ability to transition medications to PO and progress diet, tentatively planned for 9/26  - Repeat EGD 10/13 to check healing of ulcer, sooner if hgb declines further     We appreciate the excellent care provided by the [place] [id number] team.  Recommendations communicated via in person rounding and this note.  Will continue to follow along closely, please do not hesitate to call with any questions or concerns.     Patient discussed with Dr. Paredes.    Catherine Johnson, DNP, APRN, CNP  Inpatient Nurse Practitioner  Pulmonary CF/Transplant     Subjective & Interval History:     Intermittently on 1/2L NC with sleep and activity, otherwise on RA.  No new cough or sputum.  C/o notable pain at left chest tube site, primarily with movement and interfering sleep quality.  Chest tube output improving nicely (50 mL yesterday) but with new air leak today despite stable imaging and chest tube positioning.  Peripheral edema increasing    Review of Systems:     C: No fever, no chills, no change in weight, no change in appetite  INTEGUMENTARY/SKIN: No rash or obvious new lesions  ENT/MOUTH: No sore throat, no sinus pain, no nasal congestion or drainage  RESP: See interval history  CV: No chest pain, no palpitations, no orthopnea  GI: No nausea, no vomiting, + stable loose stools, no reflux symptoms  : No dysuria  MUSCULOSKELETAL: No myalgias, no arthralgias  ENDOCRINE: Blood sugars with adequate control  NEURO: + occ headache, no numbness or tingling  PSYCHIATRIC: Mood stable    Physical Exam:     All notes, images, and labs from past 24 hours (at minimum) were reviewed.    Vital signs:  Temp: 98.5  F (36.9  C) Temp src: Oral BP: 120/65 Pulse: 93   Resp: 18 SpO2: 93 % O2 Device: Nasal cannula with humidification Oxygen Delivery: 1/2 LPM Height: 158.8 cm (5' 2.5\") Weight: 70.4 kg (155 lb 1.6 oz)  I/O:     Intake/Output Summary (Last 24 hours) at 9/25/2022 1146  Last data filed at 9/25/2022 0900  Gross " per 24 hour   Intake 1665 ml   Output 1325 ml   Net 340 ml     Constitutional: Sitting up in a chair, in no apparent distress.   HEENT: Eyes with pink conjunctivae, anicteric.  Oral mucosa moist without lesions.  PULM: Diminished bases bilaterally L>R.  No crackles, no rhonchi, no wheezes.  Non-labored breathing on RA.  Left chest tube with air leak on WS.  CV: Normal S1 and S2.  RRR.  + faint murmur, gallop, or rub.  1+ RLE and 2+ LLE edema.  ABD: NABS, soft, nontender, nondistended.  PEG/J tube site with scant drainage.  MSK: Moves all extremities.  No apparent muscle wasting.   NEURO: Alert and oriented, conversant.  + mild tremors (minimal).  SKIN: Warm, dry.  No rash on limited exam.   PSYCH: Mood stable.     Lines, Drains, and Devices:  Peripheral IV 09/10/22 Anterior;Left Lower forearm (Active)   Site Assessment WDL 09/25/22 0930   Line Status Saline locked 09/25/22 0930   Dressing Intervention New dressing  09/10/22 0135   Phlebitis Scale 0-->no symptoms 09/25/22 0930   Infiltration Scale 0 09/25/22 0930   Number of days: 15     Data:     LABS    CMP:   Recent Labs   Lab 09/25/22  0933 09/25/22  0736 09/25/22  0536 09/24/22  2045 09/24/22  1645 09/24/22  1026 09/24/22  0615 09/23/22  0943 09/23/22  0536 09/22/22  1149 09/22/22  0551 09/21/22  0843 09/21/22  0734 09/20/22  1012 09/20/22  0701 09/19/22  0817 09/19/22  0546   NA  --   --   --   --   --   --  141  --  140  --  139  --  142  --  141  --  140   POTASSIUM  --   --   --   --   --   --  4.1  --  5.0  --  4.5  --  4.1  --  4.3  --  4.5   CHLORIDE  --   --   --   --   --   --  89*  --  87*  --  89*  --  89*  --  87*  --  89*   CO2  --   --   --   --   --   --  44*  --  50*  --  46*  --  48*  --  46*  --  47*   ANIONGAP  --   --   --   --   --   --  8  --  3*  --  4*  --  5*  --  8  --  4*   *  --  159* 146* 182*   < > 163*   < > 179*   < > 160*   < > 134*   < > 163*   < > 176*   BUN  --   --   --   --   --   --  99.5*  --  102.0*  --  106.0*  --   110.0*  --  112.0*  --  107.0*   CR  --   --   --   --   --   --  1.59*  --  1.63*  --  1.63*  --  1.63*  --  1.78*  --  1.78*   GFRESTIMATED  --   --   --   --   --   --  37*  --  36*  --  36*  --  36*  --  32*  --  32*   ESTUARDO  --   --   --   --   --   --  9.5  --  9.8  --  9.4  --  9.3  --  9.0  --  9.3   MAG  --  2.7*  --   --   --   --  2.8*  --  2.8*  --   --   --  2.9*  --  2.8*  --  2.7*   PHOS  --   --   --   --   --   --   --   --   --   --   --   --   --   --  4.0  --  3.9   PROTTOTAL  --   --   --   --   --   --  5.3*  --  5.6*  --  5.6*  --  5.6*  --  5.2*  --  5.7*   ALBUMIN  --   --   --   --   --   --  3.0*  --  3.3*  --  3.1*  --  3.2*  --  3.1*  --  3.2*   BILITOTAL  --   --   --   --   --   --  <0.2  --  0.2  --  <0.2  --  <0.2  --  <0.2  --  <0.2   ALKPHOS  --   --   --   --   --   --  87  --  89  --  84  --  82  --  75  --  90   AST  --   --   --   --   --   --  17  --  25  --  18  --  16  --  15  --  13   ALT  --   --   --   --   --   --  10  --  12  --  <5*  --  9*  --  9*  --  6*    < > = values in this interval not displayed.     CBC:   Recent Labs   Lab 09/24/22  0615 09/23/22  0536 09/22/22  0551 09/21/22  0734   WBC 7.1 6.4 6.8 6.1   RBC 2.55* 2.55* 2.63* 2.51*   HGB 7.9* 8.0* 8.3* 7.9*   HCT 26.9* 26.8* 27.8* 26.4*   * 105* 106* 105*   MCH 31.0 31.4 31.6 31.5   MCHC 29.4* 29.9* 29.9* 29.9*   RDW 19.6* 19.3* 19.4* 19.3*    207 217 191       INR: No lab results found in last 7 days.    Glucose:   Recent Labs   Lab 09/25/22  0933 09/25/22  0536 09/24/22  2045 09/24/22  1645 09/24/22  1026 09/24/22  0615   * 159* 146* 182* 154* 163*       Blood Gas:   Recent Labs   Lab 09/22/22  0551 09/21/22  1648 09/21/22  1002   PHV 7.42 7.41 7.46*   PCO2V 82* 82* 72*   PO2V 26 24* 16*   HCO3V 53* 52* 51*   LINDY 23.8* 24.2* -1.7   O2PER 30 1 21       Culture Data No results for input(s): CULT in the last 168 hours.    Virology Data:   Lab Results   Component Value Date    FLUAH1 Not  Detected 09/13/2022    FLUAH3 Not Detected 09/13/2022    HE8339 Not Detected 09/13/2022    IFLUB Not Detected 09/13/2022    RSVA Not Detected 09/13/2022    RSVB Not Detected 09/13/2022    PIV1 Not Detected 09/13/2022    PIV2 Not Detected 09/13/2022    PIV3 Not Detected 09/13/2022    HMPV Not Detected 09/13/2022       Historical CMV results (last 3 of prior testing):  Lab Results   Component Value Date    CMVQNT Not Detected 09/13/2022    CMVQNT Not Detected 09/07/2022    CMVQNT Not Detected 09/01/2022     No results found for: CMVLOG    Urine Studies    Recent Labs   Lab Test 09/19/22  2254 08/01/22  0319 07/08/22  0831   URINEPH 7.0 8.0* 5.5   NITRITE Negative Negative Negative   LEUKEST Large* Negative Negative   WBCU 29*  --  1       Most Recent [place] Pulmonary Function Testing (FVC/FEV1 only)  FVC-Pre   Date Value Ref Range Status   09/07/2022 1.01 L    09/01/2022 1.07 L    08/24/2022 1.23 L    08/18/2022 1.33 L      FVC-%Pred-Pre   Date Value Ref Range Status   09/07/2022 33 %    09/01/2022 35 %    08/24/2022 40 %    08/18/2022 43 %      FEV1-Pre   Date Value Ref Range Status   09/07/2022 0.98 L    09/01/2022 1.02 L    08/24/2022 1.17 L    08/18/2022 1.22 L      FEV1-%Pred-Pre   Date Value Ref Range Status   09/07/2022 40 %    09/01/2022 42 %    08/24/2022 48 %    08/18/2022 50 %        IMAGING    Recent Results (from the past 48 hour(s))   XR Chest 2 Views    Narrative    Exam: XR CHEST 2 VIEWS, 9/24/2022 9:53 AM    Indication: interval follow up, chest tubes, lung transplant history    Comparison: 9/23/2022, 9/21/2022    Findings:   Surgical changes of bilateral lung transplantation with clamshell  sternotomy wires and mediastinal surgical clips. Stable left pigtail  chest tube. Stable bilateral loculated pleural effusions, left greater  than right. No appreciable pneumothorax with resolution of pleural air  on the left. Stable mild streaky perihilar and medial bibasilar  opacities.      Impression     Impression: No significant change in small left greater than right  loculated pleural effusions with left chest tube in place. No  pneumothorax.    CECI REGAN DO         SYSTEM ID:  [id number]   XR Chest 2 Views    Narrative    EXAM: XR CHEST 2 VIEWS  9/25/2022 8:14 AM      HISTORY: interval follow up, chest tubes, lung transplant history    COMPARISON: 9/24/2022    FINDINGS: Two views of the chest. Stable position of left basilar  pigtail catheter chest tube. Prior lung transplantation with intact  clam shell sternotomy wires. Stable position of trachea.  Cardiomediastinal silhouette is similar to prior. Stable bilateral  loculated pleural effusions, left greater than right. No appreciable  pneumothorax. Stable streaky perihilar and bibasilar opacities. No new  focal consolidative opacity.      Impression    IMPRESSION:  Stable left greater than right loculated pleural effusions. Stable  left chest tube. No pneumothorax.    I have personally reviewed the examination and initial interpretation  and I agree with the findings.    CECI REGAN DO         SYSTEM ID:  [id number]

## 2022-09-25 NOTE — PLAN OF CARE
Neuro: Alox4. Forgetful at times.   Cardiac: not on tele. AVSS.   Respiratory: LSC. On RA. CT(pigtail) to sxn and it is w/o an air leak.  Minimal output (~25cc out this shift).   GI/: Poor appetite.  TF running at 40 via J tube.  No c/o n or V.  Pt had watery BMx1 this shift.    : Adequate UO.   Activity: OOB and up in chair for about 2 hrs this shift.  Pt ambulated to the bathroom x1/tolerated activity.   Pain: no pan med given this shift.   Pt stated her pain is tolerable after the last Robaxin that she was given during the previous shift.    Skin: bruised skin  LDA's: PIV. Pig tail CT drsg CDI  Plan:  NPO after midnight for for tunneled catheter placement by interventional urology on 9/26 followed by possible dialysis.  Repeat gastric emptying study to evaluate for ability to transition medications to PO and progress diet, tentatively planned for 9/26

## 2022-09-25 NOTE — PROGRESS NOTES
[facility]    Medicine Progress Note - Hospitalist Service, GOLD TEAM 10    Date of Admission:  9/9/2022    Assessment & Plan        62 year old female with pertinent hx of end stage COPD s/p bilateral sequential lung transplant (6/22) on triple IS ( MMF/Tacro/pred), pyloric ulcer, recent ERCP c/f hemobilia, gastroparesis s/p GJ tube placement and Percutaneous J tube presents for a planned admission from transplant pulmonology to work up antibody medicated rejection versus infection.     1.  Diarrhea likely secondary to tube feeding, not present on admission  The patient was repeatedly ruled out for C. difficile colitis.  I consulted with adult dietitian to assist with management of diarrhea related to tube feeding.  Continue fibers at 28 g and Imodium as needed.    2.  Acute kidney injury AKIN stage II on CKD stage IIIb, all are present on admission  We will continue to monitor kidney function daily with strict input and output and daily body weight.  The patient has been ruled out for any obstructive renal pathology with a renal ultrasound dated 9/22/2022.Valganciclovir is currently adjusted to the kidney function.  Plan for tunneled catheter placement by interventional urology on 9/26 followed by hopefully dialysis.     3.  Acute hypoxic hypercarbic respiratory failure secondary to bibasilar pleural effusion currently with chest tubes bilaterally on top of end-stage COPD s/p bilateral sequential lung transplant on 6/22/2022 complicated by chronic respiratory acidosis with compensation by metabolic alkalosis, all are present on admission   This is the main problem that led to this admission.  The patient has bilateral chest tubes.  The patient had her right-sided chest tube removed on 9/22/2022.    Pain and air leak related to left-sided chest tube is noted.    -Continue daily chest x-ray  -The patient is being considered for left-sided chest tube removal on 9/25  -DSA  testing on 10/5/2022  -IgG level ordered for 9/29  -Vibha-Barr virus quantitative was ordered for 10/7/2022  -Continue 3 drug immunosuppression with azathioprine, prednisone, and tacrolimus  -Prednisone taper to be performed on 10/13/2022  -Continue oxycodone at 5 mg every 4 hours as needed for chest pain related to left-sided chest tube  -The patient declined any further lidocaine patches since 2 patches of lidocaine daily did not help with the pain  -Started Robaxin as needed for adjunct therapy for pain management  -Increased gabapentin to 200 mg nightly  -Continue BiPAP as detailed above  -Continue valganciclovir for cytomegalovirus prophylaxis through 9/28/2022  -Continue dapsone for PJP prophylaxis  -Continue folic acid while on dapsone  -Continue nystatin.  Prophylaxis protocol post lung transplant  -I highly appreciate input of transplant pulmonology service     4.  Significant delayed gastric emptying s/p gastrojejunostomy tube placement, all are present on admission  We will continue to utilize jejunal tube for nutrition.  I ordered a follow-up nuclear medicine gastric emptying study for evaluation of any hopefully improvement in the patient's gastric emptying.  This can also be performed as outpatient once the patient is ready for discharge.  However, I am optimistic that this can happen on 9/26/2022.    5.  Resolved medical problems  #Acute on chronic heart failure with preserved ejection fraction LVEF 65%, the patient was appropriately diuresed  #Steroid-induced hyperglycemia and diabetes mellitus, continue insulin Lantus with insulin NovoLog sliding scale  # Acute blood loss anemia secondary to pyloric ulcer on top of chronic anemia of chronic disease, all are present on admission  -Continue pantoprazole 40 mg twice daily  -Repeat endoscopy for October 2022  #Acute metabolic encephalopathy secondary to urinary tract infection, not clear if present on admission, ordered a total of 5-day course of  ceftriaxone for which the patient encephalopathy resolved     6.  Chronic medical problems   # extra hepatic and intrahepatic biliary dilation c/f hemobilia   # Intra ductal papillary mucinous neoplasm   ERCP 8/11 showed hemobilia.   - Monitor LFTs      #CKD stage III with superimposed BACILIO (likely 2/2 recent diuresis)  Patient has been variable GFRs 30-50s in the last few months. Most recent Cr trend 1.5-1.9 1.56 9/8/22.   - CTM      #Severe gastroparesis s/p  GJ tube placement 7/27/2022  - RD nutrition consult placed for TF  - Percutaneous j tube in place with G venting to gravity      #HTN  # A flutter with RVR/SVT   . Has recently completed zio patch.  - Continue PTA metoprolol 50 mg BID    #Diarrhea with plan to trial transition to Myfortic. Resume imodium for now      # LLE swelling check US r/o DVT             Diet: Full Liquid Diet  NPO per Anesthesia Guidelines for Procedure/Surgery Except for: Meds  Snacks/Supplements Adult: Nepro Oral Supplement; Between Meals  Adult Formula Drip Feeding: Continuous Nepro with Carbsteady; Jejunostomy; Goal Rate: 40 ml/hr--okay to begin at goal once new formula arrives on unit.; mL/hr; Medication - Feeding Tube Flush Frequency: At least 15-30 mL water before and after med...    DVT Prophylaxis: Heparin SQ  Dong Catheter: Not present  Central Lines: None  Cardiac Monitoring: None  Code Status: Full Code      Disposition Plan      Expected Discharge Date: 09/30/2022        Discharge Comments: awaiting removal of left-sided chest tube and initiation of dialysis prior to discharge TBD     The patient's care was discussed with the Patient, Patient's Family and Pulmonary Consultant.    Abbe Abebe MD  Hospitalist Service, GOLD TEAM 10  [facility]  Securely message with the Vocera Web Console (learn more here)  Text page via [employer] [facility] Paging/Directory   Please see signed in provider for up to date coverage  information      Clinically Significant Risk Factors Present on Admission                      ______________________________________________________________________    Interval History   The patient chest pain related to chest tube.  She denies any subjective fever or chills.  Four-point review of systems is otherwise negative.    Data reviewed today: I reviewed all medications, new labs and imaging results over the last 24 hours.     Physical Exam   Vital Signs: Temp: 97.8  F (36.6  C) Temp src: Oral BP: 128/67 Pulse: 97   Resp: 18 SpO2: 91 % O2 Device: None (Room air) Oxygen Delivery: 1/2 LPM  Weight: 155 lbs 1.6 oz  Constitutional: Awake, alert, cooperative, no apparent distress.  Eyes: Conjunctiva and pupils examined and normal.  HEENT: Moist mucous membranes, normal dentition.  Respiratory: Clear to auscultation bilaterally, no crackles or wheezing.  Cardiovascular: Regular rate and rhythm, normal S1 and S2, and no murmur noted.  GI: Soft, non-distended, non-tender, normal bowel sounds.  Lymph/Hematologic: No anterior cervical or supraclavicular adenopathy.  Skin: No rashes, no cyanosis, no edema.  Musculoskeletal: No joint swelling, erythema or tenderness.  Neurologic: Cranial nerves 2-12 intact, normal strength and sensation.  Psychiatric: Alert, oriented to person, place and time, no obvious anxiety or depression.  Data   Recent Labs   Lab 09/25/22  1624 09/25/22  0933 09/25/22  0536 09/24/22  1026 09/24/22  0615 09/23/22  0943 09/23/22  0536 09/22/22  1149 09/22/22  0551   WBC  --   --   --   --  7.1  --  6.4  --  6.8   HGB  --   --   --   --  7.9*  --  8.0*  --  8.3*   MCV  --   --   --   --  106*  --  105*  --  106*   PLT  --   --   --   --  170  --  207  --  217   NA  --   --   --   --  141  --  140  --  139   POTASSIUM  --   --   --   --  4.1  --  5.0  --  4.5   CHLORIDE  --   --   --   --  89*  --  87*  --  89*   CO2  --   --   --   --  44*  --  50*  --  46*   BUN  --   --   --   --  99.5*  --  102.0*   --  106.0*   CR  --   --   --   --  1.59*  --  1.63*  --  1.63*   ANIONGAP  --   --   --   --  8  --  3*  --  4*   ESTUARDO  --   --   --   --  9.5  --  9.8  --  9.4   * 199* 159*   < > 163*   < > 179*   < > 160*   ALBUMIN  --   --   --   --  3.0*  --  3.3*  --  3.1*   PROTTOTAL  --   --   --   --  5.3*  --  5.6*  --  5.6*   BILITOTAL  --   --   --   --  <0.2  --  0.2  --  <0.2   ALKPHOS  --   --   --   --  87  --  89  --  84   ALT  --   --   --   --  10  --  12  --  <5*   AST  --   --   --   --  17  --  25  --  18    < > = values in this interval not displayed.     Recent Results (from the past 24 hour(s))   XR Chest 2 Views    Narrative    EXAM: XR CHEST 2 VIEWS  9/25/2022 8:14 AM      HISTORY: interval follow up, chest tubes, lung transplant history    COMPARISON: 9/24/2022    FINDINGS: Two views of the chest. Stable position of left basilar  pigtail catheter chest tube. Prior lung transplantation with intact  clam shell sternotomy wires. Stable position of trachea.  Cardiomediastinal silhouette is similar to prior. Stable bilateral  loculated pleural effusions, left greater than right. No appreciable  pneumothorax. Stable streaky perihilar and bibasilar opacities. No new  focal consolidative opacity.      Impression    IMPRESSION:  Stable left greater than right loculated pleural effusions. Stable  left chest tube. No pneumothorax.    I have personally reviewed the examination and initial interpretation  and I agree with the findings.    CECI REGAN DO         SYSTEM ID:  [id number]     Medications     dextrose       - MEDICATION INSTRUCTIONS -       - MEDICATION INSTRUCTIONS -         azaTHIOprine  100 mg Per J Tube Daily     banatrol plus  1 packet Per Feeding Tube BID     calcium carbonate 600 mg-vitamin D 400 units  1 tablet Per J Tube BID w/meals     cyanocobalamin  500 mcg Per Feeding Tube Daily     dapsone  50 mg Per J Tube Q Mon Wed Fri AM     folic acid  1 mg Oral or Feeding Tube Daily      gabapentin  200 mg Oral At Bedtime     heparin ANTICOAGULANT  5,000 Units Subcutaneous Q12H     insulin aspart  1-6 Units Subcutaneous Q6H     insulin glargine  6 Units Subcutaneous QAM AC     lidocaine  2 patch Transdermal Q24h    And     lidocaine   Transdermal Q8H TONY     menthol  1 patch Topical QAM    And     menthol   Transdermal Q8H     menthol   Transdermal Daily     metoprolol  12.5 mg Per J Tube BID     multivitamins w/minerals  15 mL Per Feeding Tube Daily     nystatin  1,000,000 Units Swish & Swallow 4x Daily     pantoprazole  40 mg Per J Tube BID     prednisoLONE  10 mg Per J Tube Daily     prednisoLONE  7.5 mg Per J Tube QPM     sodium chloride (PF)  3 mL Intracatheter Q8H     tacrolimus  4 mg Per J Tube QPM     tacrolimus  4.5 mg Per J Tube QAM     valGANciclovir  450 mg Oral or Feeding Tube Once per day on Mon Wed Fri

## 2022-09-26 ENCOUNTER — APPOINTMENT (OUTPATIENT)
Dept: INTERVENTIONAL RADIOLOGY/VASCULAR | Facility: CLINIC | Age: 62
DRG: 205 | End: 2022-09-26
Attending: NURSE PRACTITIONER
Payer: MEDICARE

## 2022-09-26 ENCOUNTER — APPOINTMENT (OUTPATIENT)
Dept: GENERAL RADIOLOGY | Facility: CLINIC | Age: 62
DRG: 205 | End: 2022-09-26
Attending: NURSE PRACTITIONER
Payer: MEDICARE

## 2022-09-26 LAB
ALBUMIN SERPL BCG-MCNC: 3.1 G/DL (ref 3.5–5.2)
ALP SERPL-CCNC: 79 U/L (ref 35–104)
ALT SERPL W P-5'-P-CCNC: 11 U/L (ref 10–35)
ANION GAP SERPL CALCULATED.3IONS-SCNC: 6 MMOL/L (ref 7–15)
AST SERPL W P-5'-P-CCNC: 25 U/L (ref 10–35)
BASOPHILS # BLD AUTO: 0 10E3/UL (ref 0–0.2)
BASOPHILS NFR BLD AUTO: 0 %
BILIRUB SERPL-MCNC: 0.2 MG/DL
BUN SERPL-MCNC: 96.7 MG/DL (ref 8–23)
CALCIUM SERPL-MCNC: 9.4 MG/DL (ref 8.8–10.2)
CHLORIDE SERPL-SCNC: 87 MMOL/L (ref 98–107)
CREAT SERPL-MCNC: 1.72 MG/DL (ref 0.51–0.95)
DEPRECATED HCO3 PLAS-SCNC: 44 MMOL/L (ref 22–29)
EOSINOPHIL # BLD AUTO: 0.1 10E3/UL (ref 0–0.7)
EOSINOPHIL NFR BLD AUTO: 1 %
ERYTHROCYTE [DISTWIDTH] IN BLOOD BY AUTOMATED COUNT: 19.9 % (ref 10–15)
FERRITIN SERPL-MCNC: 769 NG/ML (ref 11–328)
GFR SERPL CREATININE-BSD FRML MDRD: 33 ML/MIN/1.73M2
GLUCOSE BLDC GLUCOMTR-MCNC: 151 MG/DL (ref 70–99)
GLUCOSE BLDC GLUCOMTR-MCNC: 157 MG/DL (ref 70–99)
GLUCOSE BLDC GLUCOMTR-MCNC: 176 MG/DL (ref 70–99)
GLUCOSE BLDC GLUCOMTR-MCNC: 182 MG/DL (ref 70–99)
GLUCOSE BLDC GLUCOMTR-MCNC: 194 MG/DL (ref 70–99)
GLUCOSE SERPL-MCNC: 148 MG/DL (ref 70–99)
HCT VFR BLD AUTO: 27.4 % (ref 35–47)
HGB BLD-MCNC: 8.2 G/DL (ref 11.7–15.7)
IMM GRANULOCYTES # BLD: 0.3 10E3/UL
IMM GRANULOCYTES NFR BLD: 3 %
IRON BINDING CAPACITY (ROCHE): 249 UG/DL (ref 240–430)
IRON SATN MFR SERPL: 22 % (ref 15–46)
IRON SERPL-MCNC: 54 UG/DL (ref 37–145)
LYMPHOCYTES # BLD AUTO: 0.3 10E3/UL (ref 0.8–5.3)
LYMPHOCYTES NFR BLD AUTO: 4 %
MAGNESIUM SERPL-MCNC: 2.7 MG/DL (ref 1.7–2.3)
MCH RBC QN AUTO: 30.9 PG (ref 26.5–33)
MCHC RBC AUTO-ENTMCNC: 29.9 G/DL (ref 31.5–36.5)
MCV RBC AUTO: 103 FL (ref 78–100)
MONOCYTES # BLD AUTO: 0.4 10E3/UL (ref 0–1.3)
MONOCYTES NFR BLD AUTO: 5 %
NEUTROPHILS # BLD AUTO: 7.4 10E3/UL (ref 1.6–8.3)
NEUTROPHILS NFR BLD AUTO: 87 %
NRBC # BLD AUTO: 0 10E3/UL
NRBC BLD AUTO-RTO: 0 /100
PHOSPHATE SERPL-MCNC: 3.7 MG/DL (ref 2.5–4.5)
PLATELET # BLD AUTO: 224 10E3/UL (ref 150–450)
POTASSIUM SERPL-SCNC: 4.9 MMOL/L (ref 3.4–5.3)
PROT SERPL-MCNC: 5.4 G/DL (ref 6.4–8.3)
RBC # BLD AUTO: 2.65 10E6/UL (ref 3.8–5.2)
SODIUM SERPL-SCNC: 137 MMOL/L (ref 136–145)
TACROLIMUS BLD-MCNC: 11.6 UG/L (ref 5–15)
TME LAST DOSE: NORMAL H
TME LAST DOSE: NORMAL H
WBC # BLD AUTO: 8.5 10E3/UL (ref 4–11)

## 2022-09-26 PROCEDURE — 36558 INSERT TUNNELED CV CATH: CPT | Performed by: PHYSICIAN ASSISTANT

## 2022-09-26 PROCEDURE — 250N000011 HC RX IP 250 OP 636: Performed by: NURSE PRACTITIONER

## 2022-09-26 PROCEDURE — 214N000001 HC R&B CCU UMMC

## 2022-09-26 PROCEDURE — 250N000011 HC RX IP 250 OP 636: Performed by: PHYSICIAN ASSISTANT

## 2022-09-26 PROCEDURE — 83970 ASSAY OF PARATHORMONE: CPT

## 2022-09-26 PROCEDURE — 82728 ASSAY OF FERRITIN: CPT

## 2022-09-26 PROCEDURE — 250N000013 HC RX MED GY IP 250 OP 250 PS 637

## 2022-09-26 PROCEDURE — C1769 GUIDE WIRE: HCPCS

## 2022-09-26 PROCEDURE — 82306 VITAMIN D 25 HYDROXY: CPT

## 2022-09-26 PROCEDURE — 99233 SBSQ HOSP IP/OBS HIGH 50: CPT | Performed by: INTERNAL MEDICINE

## 2022-09-26 PROCEDURE — 85004 AUTOMATED DIFF WBC COUNT: CPT | Performed by: INTERNAL MEDICINE

## 2022-09-26 PROCEDURE — 84100 ASSAY OF PHOSPHORUS: CPT | Performed by: INTERNAL MEDICINE

## 2022-09-26 PROCEDURE — 80197 ASSAY OF TACROLIMUS: CPT | Performed by: PHYSICIAN ASSISTANT

## 2022-09-26 PROCEDURE — 250N000013 HC RX MED GY IP 250 OP 250 PS 637: Performed by: NURSE PRACTITIONER

## 2022-09-26 PROCEDURE — 99233 SBSQ HOSP IP/OBS HIGH 50: CPT | Mod: 24

## 2022-09-26 PROCEDURE — 250N000013 HC RX MED GY IP 250 OP 250 PS 637: Performed by: INTERNAL MEDICINE

## 2022-09-26 PROCEDURE — 250N000009 HC RX 250: Performed by: NURSE PRACTITIONER

## 2022-09-26 PROCEDURE — 99152 MOD SED SAME PHYS/QHP 5/>YRS: CPT

## 2022-09-26 PROCEDURE — 76937 US GUIDE VASCULAR ACCESS: CPT | Mod: 26 | Performed by: PHYSICIAN ASSISTANT

## 2022-09-26 PROCEDURE — 258N000003 HC RX IP 258 OP 636

## 2022-09-26 PROCEDURE — 250N000009 HC RX 250: Performed by: PHYSICIAN ASSISTANT

## 2022-09-26 PROCEDURE — 250N000009 HC RX 250: Performed by: INTERNAL MEDICINE

## 2022-09-26 PROCEDURE — 83550 IRON BINDING TEST: CPT

## 2022-09-26 PROCEDURE — 5A1D70Z PERFORMANCE OF URINARY FILTRATION, INTERMITTENT, LESS THAN 6 HOURS PER DAY: ICD-10-PCS | Performed by: PHYSICIAN ASSISTANT

## 2022-09-26 PROCEDURE — 80053 COMPREHEN METABOLIC PANEL: CPT

## 2022-09-26 PROCEDURE — 0JH60XZ INSERTION OF TUNNELED VASCULAR ACCESS DEVICE INTO CHEST SUBCUTANEOUS TISSUE AND FASCIA, OPEN APPROACH: ICD-10-PCS | Performed by: PHYSICIAN ASSISTANT

## 2022-09-26 PROCEDURE — 83735 ASSAY OF MAGNESIUM: CPT | Performed by: INTERNAL MEDICINE

## 2022-09-26 PROCEDURE — 250N000013 HC RX MED GY IP 250 OP 250 PS 637: Performed by: STUDENT IN AN ORGANIZED HEALTH CARE EDUCATION/TRAINING PROGRAM

## 2022-09-26 PROCEDURE — 06H033Z INSERTION OF INFUSION DEVICE INTO INFERIOR VENA CAVA, PERCUTANEOUS APPROACH: ICD-10-PCS | Performed by: PHYSICIAN ASSISTANT

## 2022-09-26 PROCEDURE — 250N000012 HC RX MED GY IP 250 OP 636 PS 637: Performed by: NURSE PRACTITIONER

## 2022-09-26 PROCEDURE — C1750 CATH, HEMODIALYSIS,LONG-TERM: HCPCS

## 2022-09-26 PROCEDURE — 36415 COLL VENOUS BLD VENIPUNCTURE: CPT

## 2022-09-26 PROCEDURE — 99233 SBSQ HOSP IP/OBS HIGH 50: CPT | Mod: 24 | Performed by: INTERNAL MEDICINE

## 2022-09-26 PROCEDURE — 90937 HEMODIALYSIS REPEATED EVAL: CPT

## 2022-09-26 PROCEDURE — 99152 MOD SED SAME PHYS/QHP 5/>YRS: CPT | Performed by: PHYSICIAN ASSISTANT

## 2022-09-26 PROCEDURE — 272N000602 HC WOUND GLUE CR1

## 2022-09-26 PROCEDURE — 36558 INSERT TUNNELED CV CATH: CPT

## 2022-09-26 PROCEDURE — 272N000504 HC NEEDLE CR4

## 2022-09-26 PROCEDURE — 77001 FLUOROGUIDE FOR VEIN DEVICE: CPT | Mod: 26 | Performed by: PHYSICIAN ASSISTANT

## 2022-09-26 PROCEDURE — 71046 X-RAY EXAM CHEST 2 VIEWS: CPT | Mod: 26 | Performed by: RADIOLOGY

## 2022-09-26 PROCEDURE — 71046 X-RAY EXAM CHEST 2 VIEWS: CPT

## 2022-09-26 RX ORDER — FENTANYL CITRATE 50 UG/ML
25-50 INJECTION, SOLUTION INTRAMUSCULAR; INTRAVENOUS EVERY 5 MIN PRN
Status: DISCONTINUED | OUTPATIENT
Start: 2022-09-26 | End: 2022-09-26

## 2022-09-26 RX ORDER — HEPARIN SODIUM 1000 [USP'U]/ML
3 INJECTION, SOLUTION INTRAVENOUS; SUBCUTANEOUS ONCE
Status: COMPLETED | OUTPATIENT
Start: 2022-09-26 | End: 2022-09-26

## 2022-09-26 RX ORDER — NALOXONE HYDROCHLORIDE 0.4 MG/ML
0.4 INJECTION, SOLUTION INTRAMUSCULAR; INTRAVENOUS; SUBCUTANEOUS
Status: DISCONTINUED | OUTPATIENT
Start: 2022-09-26 | End: 2022-09-26

## 2022-09-26 RX ORDER — CEFAZOLIN SODIUM 2 G/100ML
2 INJECTION, SOLUTION INTRAVENOUS
Status: COMPLETED | OUTPATIENT
Start: 2022-09-26 | End: 2022-09-26

## 2022-09-26 RX ORDER — HEPARIN SODIUM 1000 [USP'U]/ML
3 INJECTION, SOLUTION INTRAVENOUS; SUBCUTANEOUS ONCE
Status: DISCONTINUED | OUTPATIENT
Start: 2022-09-26 | End: 2022-09-26

## 2022-09-26 RX ORDER — NALOXONE HYDROCHLORIDE 0.4 MG/ML
0.2 INJECTION, SOLUTION INTRAMUSCULAR; INTRAVENOUS; SUBCUTANEOUS
Status: DISCONTINUED | OUTPATIENT
Start: 2022-09-26 | End: 2022-09-26

## 2022-09-26 RX ORDER — FLUMAZENIL 0.1 MG/ML
0.2 INJECTION, SOLUTION INTRAVENOUS
Status: DISCONTINUED | OUTPATIENT
Start: 2022-09-26 | End: 2022-09-26

## 2022-09-26 RX ORDER — OXYCODONE HCL 5 MG/5 ML
5 SOLUTION, ORAL ORAL EVERY 6 HOURS PRN
Status: DISCONTINUED | OUTPATIENT
Start: 2022-09-26 | End: 2022-10-08 | Stop reason: HOSPADM

## 2022-09-26 RX ADMIN — FENTANYL CITRATE 25 MCG: 50 INJECTION, SOLUTION INTRAMUSCULAR; INTRAVENOUS at 10:06

## 2022-09-26 RX ADMIN — LIDOCAINE HYDROCHLORIDE 10 ML: 10 INJECTION, SOLUTION EPIDURAL; INFILTRATION; INTRACAUDAL; PERINEURAL at 10:13

## 2022-09-26 RX ADMIN — NYSTATIN 1000000 UNITS: 100000 SUSPENSION ORAL at 11:36

## 2022-09-26 RX ADMIN — MULTIVITAMIN 15 ML: LIQUID ORAL at 08:10

## 2022-09-26 RX ADMIN — HEPARIN SODIUM 4000 UNITS: 1000 INJECTION INTRAVENOUS; SUBCUTANEOUS at 10:17

## 2022-09-26 RX ADMIN — MENTHOL 1 PATCH: 205.5 PATCH TOPICAL at 08:07

## 2022-09-26 RX ADMIN — Medication 40 MG: at 19:20

## 2022-09-26 RX ADMIN — GABAPENTIN 200 MG: 250 SOLUTION ORAL at 19:20

## 2022-09-26 RX ADMIN — Medication: at 12:19

## 2022-09-26 RX ADMIN — NYSTATIN 1000000 UNITS: 100000 SUSPENSION ORAL at 16:11

## 2022-09-26 RX ADMIN — MIDAZOLAM HYDROCHLORIDE 0.5 MG: 1 INJECTION, SOLUTION INTRAMUSCULAR; INTRAVENOUS at 10:06

## 2022-09-26 RX ADMIN — NYSTATIN 1000000 UNITS: 100000 SUSPENSION ORAL at 08:14

## 2022-09-26 RX ADMIN — VALGANCICLOVIR HYDROCHLORIDE 450 MG: 50 POWDER, FOR SOLUTION ORAL at 09:08

## 2022-09-26 RX ADMIN — NYSTATIN 1000000 UNITS: 100000 SUSPENSION ORAL at 19:20

## 2022-09-26 RX ADMIN — ORAL VEHICLES - SUSP 12.5 MG: SUSPENSION at 19:22

## 2022-09-26 RX ADMIN — Medication 40 MG: at 08:09

## 2022-09-26 RX ADMIN — INSULIN ASPART 2 UNITS: 100 INJECTION, SOLUTION INTRAVENOUS; SUBCUTANEOUS at 09:13

## 2022-09-26 RX ADMIN — FENTANYL CITRATE 25 MCG: 50 INJECTION, SOLUTION INTRAMUSCULAR; INTRAVENOUS at 10:13

## 2022-09-26 RX ADMIN — OXYCODONE HYDROCHLORIDE 5 MG: 5 SOLUTION ORAL at 17:43

## 2022-09-26 RX ADMIN — ORAL VEHICLES - SUSP 12.5 MG: SUSPENSION at 08:09

## 2022-09-26 RX ADMIN — Medication 100 MG: at 07:58

## 2022-09-26 RX ADMIN — Medication 1 PACKET: at 08:09

## 2022-09-26 RX ADMIN — SODIUM CHLORIDE 250 ML: 9 INJECTION, SOLUTION INTRAVENOUS at 12:19

## 2022-09-26 RX ADMIN — CYANOCOBALAMIN TAB 500 MCG 500 MCG: 500 TAB at 08:08

## 2022-09-26 RX ADMIN — INSULIN ASPART 1 UNITS: 100 INJECTION, SOLUTION INTRAVENOUS; SUBCUTANEOUS at 16:04

## 2022-09-26 RX ADMIN — FOLIC ACID 1 MG: 1 TABLET ORAL at 07:57

## 2022-09-26 RX ADMIN — Medication 1 PACKET: at 19:22

## 2022-09-26 RX ADMIN — LOPERAMIDE HYDROCHLORIDE 2 MG: 2 CAPSULE ORAL at 17:43

## 2022-09-26 RX ADMIN — DAPSONE 50 MG: 100 TABLET ORAL at 08:09

## 2022-09-26 RX ADMIN — SODIUM CHLORIDE 300 ML: 9 INJECTION, SOLUTION INTRAVENOUS at 12:19

## 2022-09-26 RX ADMIN — MIDAZOLAM HYDROCHLORIDE 0.5 MG: 1 INJECTION, SOLUTION INTRAMUSCULAR; INTRAVENOUS at 10:12

## 2022-09-26 RX ADMIN — CALCIUM CARBONATE 600 MG (1,500 MG)-VITAMIN D3 400 UNIT TABLET 1 TABLET: at 17:43

## 2022-09-26 RX ADMIN — LOPERAMIDE HYDROCHLORIDE 2 MG: 2 CAPSULE ORAL at 09:06

## 2022-09-26 RX ADMIN — CALCIUM CARBONATE 600 MG (1,500 MG)-VITAMIN D3 400 UNIT TABLET 1 TABLET: at 07:57

## 2022-09-26 RX ADMIN — TACROLIMUS 4 MG: 5 CAPSULE ORAL at 17:51

## 2022-09-26 RX ADMIN — OXYCODONE HYDROCHLORIDE 5 MG: 5 SOLUTION ORAL at 11:31

## 2022-09-26 RX ADMIN — PREDNISOLONE 10 MG: 15 SOLUTION ORAL at 08:10

## 2022-09-26 RX ADMIN — TACROLIMUS 4.5 MG: 5 CAPSULE ORAL at 08:09

## 2022-09-26 RX ADMIN — CEFAZOLIN SODIUM 2 G: 2 INJECTION, SOLUTION INTRAVENOUS at 09:56

## 2022-09-26 RX ADMIN — PREDNISOLONE 7.5 MG: 15 SOLUTION ORAL at 19:22

## 2022-09-26 RX ADMIN — METHOCARBAMOL 500 MG: 500 TABLET ORAL at 15:24

## 2022-09-26 ASSESSMENT — ACTIVITIES OF DAILY LIVING (ADL)
ADLS_ACUITY_SCORE: 31
ADLS_ACUITY_SCORE: 33
ADLS_ACUITY_SCORE: 31
ADLS_ACUITY_SCORE: 33
ADLS_ACUITY_SCORE: 31
ADLS_ACUITY_SCORE: 33
ADLS_ACUITY_SCORE: 31
ADLS_ACUITY_SCORE: 33

## 2022-09-26 NOTE — PROGRESS NOTES
[facility]    Medicine Progress Note - Hospitalist Service, GOLD TEAM 10    Date of Admission:  9/9/2022    Assessment & Plan        62 year old female with pertinent hx of end stage COPD s/p bilateral sequential lung transplant (6/22) on triple IS ( MMF/Tacro/pred), pyloric ulcer, recent ERCP c/f hemobilia, gastroparesis s/p GJ tube placement and Percutaneous J tube presents for a planned admission from transplant pulmonology to work up antibody medicated rejection versus infection.     1.  Acute kidney injury AKIN stage II on CKD stage IIIb, all are present on admission  We will continue to monitor kidney function daily with strict input and output and daily body weight.  The patient has been ruled out for any obstructive renal pathology with a renal ultrasound dated 9/22/2022.Valganciclovir is currently adjusted to the kidney function.  The patient had an uneventful placement of tunneled catheter by interventional etiology on 9/26.  The patient had her first tunneled dialysis on 9/26.  Plan for dialysis on 9/27 and 9/28 then to assess for the need for further dialysis afterwards.  Vitamin D level and parathyroid hormone were ordered based on recommendations of nephrology service.    2.  Diarrhea likely secondary to tube feeding, not present on admission  The patient was repeatedly ruled out for C. difficile colitis.  I consulted with adult dietitian to assist with management of diarrhea related to tube feeding.  Continue fibers at 28 g and Imodium as needed.     3.  Acute hypoxic hypercarbic respiratory failure secondary to bibasilar pleural effusion currently with chest tubes bilaterally on top of end-stage COPD s/p bilateral sequential lung transplant on 6/22/2022 complicated by chronic respiratory acidosis with compensation by metabolic alkalosis, all are present on admission   This is the main problem that led to this admission.  The patient has bilateral chest  tubes.  The patient had her right-sided chest tube removed on 9/22/2022.  -Left-sided chest tube is clamped with chest x-ray to be obtained on 9/27 for consideration of removal of left-sided chest tube  -DSA testing on 10/5/2022  -IgG level ordered for 9/29  -Vibha-Barr virus quantitative was ordered for 10/7/2022  -Continue 3 drug immunosuppression with azathioprine, prednisone, and tacrolimus  -Prednisone taper to be performed on 10/13/2022  -Continue oxycodone at 5 mg every 4 hours as needed for chest pain related to left-sided chest tube  -The patient declined any further lidocaine patches since 2 patches of lidocaine daily did not help with the pain  -Started Robaxin as needed for adjunct therapy for pain management  -Increased gabapentin to 200 mg nightly  -Continue BiPAP as detailed above  -Continue valganciclovir for cytomegalovirus prophylaxis through 9/28/2022  -Continue dapsone for PJP prophylaxis  -Continue folic acid while on dapsone  -Continue nystatin.  Prophylaxis protocol post lung transplant  -I highly appreciate input of transplant pulmonology service     4.  Significant delayed gastric emptying s/p gastrojejunostomy tube placement, all are present on admission  We will continue to utilize jejunal tube for nutrition.  I ordered a follow-up nuclear medicine gastric emptying study for evaluation of any hopefully improvement in the patient's gastric emptying.  This to be obtained on 9/2922    5.  Resolved medical problems  #Acute on chronic heart failure with preserved ejection fraction LVEF 65%, the patient was appropriately diuresed  #Steroid-induced hyperglycemia and diabetes mellitus, continue insulin Lantus with insulin NovoLog sliding scale  # Acute blood loss anemia secondary to pyloric ulcer on top of chronic anemia of chronic disease, all are present on admission  -Continue pantoprazole 40 mg twice daily  -Repeat endoscopy for October 2022  #Acute metabolic encephalopathy secondary to  urinary tract infection, not clear if present on admission, ordered a total of 5-day course of ceftriaxone for which the patient encephalopathy resolved     6.  Chronic medical problems   # extra hepatic and intrahepatic biliary dilation c/f hemobilia   # Intra ductal papillary mucinous neoplasm   ERCP 8/11 showed hemobilia.   - Monitor LFTs      #CKD stage III with superimposed BACILIO (likely 2/2 recent diuresis)  Patient has been variable GFRs 30-50s in the last few months. Most recent Cr trend 1.5-1.9 1.56 9/8/22.   - CTM      #Severe gastroparesis s/p  GJ tube placement 7/27/2022  - RD nutrition consult placed for TF  - Percutaneous j tube in place with G venting to gravity      #HTN  # A flutter with RVR/SVT   . Has recently completed zio patch.  - Continue PTA metoprolol 50 mg BID    #Diarrhea with plan to trial transition to Myfortic. Resume imodium for now      # LLE swelling check US r/o DVT             Diet: Snacks/Supplements Adult: Nepro Oral Supplement; Between Meals  Adult Formula Drip Feeding: Continuous Nepro with Carbsteady; Jejunostomy; Goal Rate: 40 ml/hr--okay to begin at goal once new formula arrives on unit.; mL/hr; Medication - Feeding Tube Flush Frequency: At least 15-30 mL water before and after med...  Full Liquid Diet    DVT Prophylaxis: Heparin SQ  Dong Catheter: Not present  Central Lines: PRESENT  CVC Double Lumen Right Internal jugular Tunneled-Site Assessment: WDL  Cardiac Monitoring: None  Code Status: Full Code      Disposition Plan     Expected Discharge Date: 09/30/2022        Discharge Comments: awaiting removal of left-sided chest tube and initiation of dialysis prior to discharge TBD     The patient's care was discussed with the Patient, Patient's Family and Pulmonary Consultant.    Abbe Abebe MD  Hospitalist Service, GOLD TEAM [address] [place] [address]  Securely message with the Vocera Web Console (learn more here)  Text page via [employer]  Paging/Directory   Please see signed in provider for up to date coverage information      Clinically Significant Risk Factors Present on Admission                      ______________________________________________________________________    Interval History   The patient reports that she felt okay with dialysis and placement of the pump catheter.  No reported subjective fever or chills.  Four-point review of systems is otherwise negative.    Data reviewed today: I reviewed all medications, new labs and imaging results over the last 24 hours.     Physical Exam   Vital Signs: Temp: 98.5  F (36.9  C) Temp src: Oral BP: (!) 142/73 Pulse: 96   Resp: 16 SpO2: 97 % O2 Device: Nasal cannula Oxygen Delivery: 1 LPM  Weight: 155 lbs 9.6 oz  Constitutional: Awake, alert, cooperative, no apparent distress.  Eyes: Conjunctiva and pupils examined and normal.  HEENT: Moist mucous membranes, normal dentition.  Respiratory: Clear to auscultation bilaterally, no crackles or wheezing.  Cardiovascular: Regular rate and rhythm, normal S1 and S2, and no murmur noted.  GI: Soft, non-distended, non-tender, normal bowel sounds.  Lymph/Hematologic: No anterior cervical or supraclavicular adenopathy.  Skin: No rashes, no cyanosis, no edema.  Musculoskeletal: No joint swelling, erythema or tenderness.  Neurologic: Cranial nerves 2-12 intact, normal strength and sensation.  Psychiatric: Alert, oriented to person, place and time, no obvious anxiety or depression.  Data   Recent Labs   Lab 09/26/22  1555 09/26/22  1139 09/26/22  0913 09/26/22  0555 09/24/22  1026 09/24/22  0615 09/23/22  0943 09/23/22  0536   WBC  --   --   --  8.5  --  7.1  --  6.4   HGB  --   --   --  8.2*  --  7.9*  --  8.0*   MCV  --   --   --  103*  --  106*  --  105*   PLT  --   --   --  224  --  170  --  207   NA  --   --   --  137  --  141  --  140   POTASSIUM  --   --   --  4.9  --  4.1  --  5.0   CHLORIDE  --   --   --  87*  --  89*  --  87*   CO2  --   --   --  44*  --   44*  --  50*   BUN  --   --   --  96.7*  --  99.5*  --  102.0*   CR  --   --   --  1.72*  --  1.59*  --  1.63*   ANIONGAP  --   --   --  6*  --  8  --  3*   ESTUARDO  --   --   --  9.4  --  9.5  --  9.8   * 151* 194* 148*   < > 163*   < > 179*   ALBUMIN  --   --   --  3.1*  --  3.0*  --  3.3*   PROTTOTAL  --   --   --  5.4*  --  5.3*  --  5.6*   BILITOTAL  --   --   --  0.2  --  <0.2  --  0.2   ALKPHOS  --   --   --  79  --  87  --  89   ALT  --   --   --  11  --  10  --  12   AST  --   --   --  25  --  17  --  25    < > = values in this interval not displayed.     Recent Results (from the past 24 hour(s))   IR CVC Tunnel Placement > 5 Yrs of Age    Narrative    PRE-PROCEDURE DIAGNOSIS:  End-stage renal disease    POST-PROCEDURE DIAGNOSIS:  Same    PROCEDURE: Tunneled central venous catheter placement    Impression    IMPRESSION:  Completed image-guided placement of 14.5 Korean, 23 cm  double lumen tunneled central venous catheter via right internal  jugular vein. Catheter tip in high right atrium. Aspirates and flushes  freely, heparin locked and ready for immediate use.  No complication.     ----------    CLINICAL HISTORY:  Patient requires central venous access for dialysis  therapy.  Tunneled central venous catheter placement requested.    PERFORMED BY:  Jannet Gonzalez PA-C    CONSENT:  The patient understood the limitations, alternatives, and  risks of the procedure and agreed to the procedure.  Written informed  consent was obtained and is documented in the patient record.    SEDATION CONSENT:  It was explained to the patient that medications  used for sedation and pain relief may be administered, if needed, to  reduce anxiety and discomfort that may be associated with the  procedure.  Serious side effects from the medications are rare but may  include prolonged drowsiness and difficulty breathing, possibly  requiring placement of a breathing tube to ventilate the lungs.    MEDICATIONS:  Intravenous sedation  was administered with 1 mg  midazolam and 50 mcg fentanyl.  A 10:1 volume mixture of 1% lidocaine  without epinephrine buffered with 8.4% bicarbonate solution was  available for local anesthesia.  The catheter was heparin locked upon  completion of placement.    NURSING:  The patient was placed on continuous vital signs monitoring.   Vital signs and sedation were monitored by nursing staff under IR  physician staff supervision.      SEDATION TIME:  20 minutes face-to-face    FLUOROSCOPY TIME: minutes    DESCRIPTION:  The right neck and upper chest were prepped and draped  in the usual sterile fashion.      Under ultrasound guidance, the right internal jugular vein was  identified and the overlying skin was anesthetized and skin  dermatotomy was made.  Under ultrasound guidance, right internal  jugular venipuncture was made with needle.  Image saved documenting  venipuncture and patency.    Needle was exchanged over guidewire for a dilator under fluoroscopic  guidance.  Length to right atrium was measured with guidewire.   Guidewire and inner dilator were removed.  Wire was advanced into  inferior vena cava under fluoroscopic guidance and secured.     The anterior chest skin was anesthetized and incision was made after  measurements were taken.  A cuffed catheter was subcutaneously  tunneled from the anterior chest incision to the internal jugular  venipuncture site after path of tunnel was anesthetized.  The dilator  was exchanged over guidewire for a peel-away sheath.  Guidewire was  removed.  Under fluoroscopic guidance, the catheter was placed through  the peel-away sheath.  Peel-away sheath was removed.      Final catheter position saved.  Both catheter lumens adequately  aspirated and flushed.  Each lumen was heparin locked.  A catheter  retaining suture and sterile dressing were applied.  The skin  dermatotomy site overlying the internal jugular venipuncture was  closed with topical adhesive.    COMPLICATIONS:   No immediate concerns; the patient remained stable  throughout the procedure and tolerated it well.    ESTIMATED BLOOD LOSS:  Minimal    SPECIMENS:  None    DANIEL HERNANDEZ PA-C         SYSTEM ID:  [id number]   XR Chest 2 Views    Narrative    Chest 2 views    INDICATION: Interval follow-up, chest tubes, lung transplant history    COMPARISON: 9/25/2022    FINDINGS: Heart size upper normal. Clamshell sternotomy from prior  bilateral lung transplant again evident. Left costophrenic angle  blunting unchanged. Left basilar chest catheter again noted. Right IJ  approach venous catheter noted with tip near the cavoatrial junction.  No pneumothorax. Scattered right lung subsegmental atelectasis.      Impression    IMPRESSION: Bilateral lung transplantation. Continued left pleural  effusion and associated atelectasis. Scattered right lung subsegmental  atelectasis.    ALVINA HARRY MD         SYSTEM ID:  [id number]     Medications     dextrose       - MEDICATION INSTRUCTIONS -       - MEDICATION INSTRUCTIONS -         azaTHIOprine  100 mg Per J Tube Daily     banatrol plus  1 packet Per Feeding Tube BID     calcium carbonate 600 mg-vitamin D 400 units  1 tablet Per J Tube BID w/meals     cyanocobalamin  500 mcg Per Feeding Tube Daily     dapsone  50 mg Per J Tube Q Mon Wed Fri AM     folic acid  1 mg Oral or Feeding Tube Daily     gabapentin  200 mg Oral At Bedtime     sodium chloride (PF) 0.9%  1.3-2.6 mL Intracatheter Once    Followed by     heparin  3 mL Intracatheter Once     sodium chloride (PF) 0.9%  1.3-2.6 mL Intracatheter Once    Followed by     heparin  3 mL Intracatheter Once     [Held by provider] heparin ANTICOAGULANT  5,000 Units Subcutaneous Q12H     insulin aspart  1-6 Units Subcutaneous Q6H     [Held by provider] insulin glargine  6 Units Subcutaneous QAM AC     lidocaine  2 patch Transdermal Q24h    And     lidocaine   Transdermal Q8H TONY     menthol  1 patch Topical QAM    And     menthol   Transdermal  Q8H     menthol   Transdermal Daily     metoprolol  12.5 mg Per J Tube BID     multivitamins w/minerals  15 mL Per Feeding Tube Daily     nystatin  1,000,000 Units Swish & Swallow 4x Daily     pantoprazole  40 mg Per J Tube BID     prednisoLONE  10 mg Per J Tube Daily     prednisoLONE  7.5 mg Per J Tube QPM     sodium chloride (PF)  3 mL Intracatheter Q8H     tacrolimus  4 mg Per J Tube QPM     tacrolimus  4.5 mg Per J Tube QAM     valGANciclovir  450 mg Oral or Feeding Tube Once per day on Mon Wed Fri

## 2022-09-26 NOTE — IR NOTE
Patient Name: Sofie Rodriguez  Medical Record Number: [id number]  Today's Date: 9/26/2022    Procedure: Tunneled Central Venous Catheter Placement  Proceduralist: JATIN Gonzalez    Sedation start time: 1006  Sedation end time: 1023  Sedation medications administered: 1 mg versed, 50 mcg fentanyl  Total sedation time: 17 min    Procedure start time: 1006  Procedure end time: 1023    Report given to: Vi RITTER  : n/a    Other Notes: Pt arrived to IR room 2 from inpatient unt. Consent reviewed, pt confirmed. Pt denies any questions or concerns regarding procedure. Pt positioned supine and monitored per protocol. Site cleansed and dressed per protocol. Pt tolerated procedure without any noted complications. Pt transferred back to inpatient unit.

## 2022-09-26 NOTE — PLAN OF CARE
HX:62 year old female s/p bilateral sequential lung transplant (6/22) r/t COPD, pyloric ulcer, recent ERCP, gastroparesis s/p GJ tube placement presents for a planned admission from transplant pulmonology to work up antibody medicated rejection versus infection.     Cardiac:no monitor, murmur present  VS:VSS, reg rhythm  IV:PIV-SL  Tubes:pigtail CT in left posterior chest, with airleak on -20 suction. Air leak noted t be intermittent. G/J tube- TF via j tube with meds.    Neuro:A/Ox4, forgetful  Resp:ANAYA, lungs clear, decreased LLL. RA sats 93-94%. Denies shortness of breath at rest. Reminded to use IS.   GI/:Voiding, diarrhea.  Nutrition:Full liquids, TF Nepro carb steady at 40ml/hour.   Endo:FSG covered with sliding scale, every 6 hours.   Skin:Abd and forearm bruising, post CT site on right C/D, open to air. Posterior CT on left with gauze/tegaderm, dressing-intact.   Activity:ambulated to bathroom, and up to BSC with A1.  Pain:C/O CT site pain.Treated with prn robaxin and oxycodone, Social:son present.  Plan:monitor CT for airleak and output (change dressing and use vaseline gauze around tube insertion site if airleak returns), encourage good coughing and IS use, assess and treat for pain as indicated, encourage increased activity and participation in cares. NPO after MN for tunneled dialysis catheter placement. Gastric emptying study this week. Continue to monitor and notify providers with questions and concerns.

## 2022-09-26 NOTE — PROGRESS NOTES
HEMODIALYSIS TREATMENT NOTE    Date: 9/26/2022  Time: 1:37 PM    Data:  Pre Wt:70.6kg     Desired Wt: 70.6kg   Post Wt: 70.6kg   Weight change:   0kg  Ultrafiltration - Post Run Net Total Removed (mL):  0ml  Vascular Access Status: patent  Dialyzer Rinse: clear   Total Blood Volume Processed:  23.9L   Total Dialysis (Treatment) Time: 2  Hours    Lab:   No    Interventions:    Assessment:  Uneventful HD initiation. Pt. Complete 2 hours HD on K2/Ca2.5 via right tunneled CVC. ,    No UF. Post /75. Report given to PCN CVC functionals optimally, caps changed saline locked post-run.      Plan:    Per renal team

## 2022-09-26 NOTE — PLAN OF CARE
D: Admitted 9/9 for planned admission from home for work up antibody medicated rejection versus infection.  Hx of end stage COPD s/p bilateral sequential lung transplant (6/22), pyloric ulcer, recent ERCP c/f hemobilia, gastroparesis s/p GJ tube placement and Percutaneous J tube.  I: Monitored vitals and assessed pt status.   Changed: Dialysis line placed, first run of dialysis.   Running: TF 40 ml/hr.   PRN: Oxycodone, robaxin, and imodium.   Tele: No tele orders.   O2: RA-1L.  Mobility: SBA.    A: A0x4. VSS. Afebrile. Urinating adequately. Several loose stools. Intermittently needing a small amount of oxygen. C/o pain at the campos and CT site. CT was coiled in the morning, continues to coil. No air leak noted. CT was clamped by pulmonary, will do chest xray tomorrow to assess for continued need.      P: Continue to monitor Pt status and report changes to Gold 10. Dialysis 9/27 and 9/28 and then assess for continued need. Possibly remove CT tomorrow pending xray results.     Temp:  [97.3  F (36.3  C)-99  F (37.2  C)] 98.5  F (36.9  C)  Pulse:  [] 96  Resp:  [13-31] 16  BP: (111-145)/(66-85) 142/73  Cuff Mean (mmHg):  [101-105] 101  SpO2:  [88 %-100 %] 97 %     Goal Outcome Evaluation:  Outcome Evaluation: Possibly remove CT tomorrow pending xray results, tolerating TFs.

## 2022-09-26 NOTE — PLAN OF CARE
I/A: A/Ox4. VSS on 1L NC to maintain sats >92%. No tele, HRs 80s-90s. Denied pain overnight. CT x1 to -20 suction, intermittent air leak, not noted this shift. Full liquid diet. TFs at goal 40 ml/hr via J tube, stopped at midnight. G tube clamped. BG q6h. Adequate UOP. BM x1, loose. A1 w/ walker. Appeared to sleep comfortably.    P: NPO including TFs for tunneled CVC placement today in IR with likely initation of HD afterwards. Gastric emptying study tomorrow. Discharge pending CT removal and medical stability. Encourage OOB/activity.    Hours of care: [id number]

## 2022-09-26 NOTE — PROGRESS NOTES
Pulmonary Medicine  Cystic Fibrosis - Lung Transplant Team  Progress Note  2022       Patient: Sofie Rodriguez  MRN: [id number]  : 1960 (age 62 year old)  Transplant: 2022 (Lung), POD#90  Admission date: 2022    Assessment & Plan:     Sofie Rodriguez is a 62-year-old female s/p BSLT (22) for COPD complicated by persistent bilateral pleural effusions, persistent CO2 retention, right hemidiaphragm palsy, BACILIO (dialysis -), C. diff colitis, gastroparesis s/p GJ tube placement (22), GI bleed 2/2 pyloric ulcer, hemobilia s/p ERCP and MRCP (), and persistent vasoplegia.  Other history notable for HFpEF, NTM colonoization, hepatitis C, and methamphetamine use.  Patient admitted 22 with persistent dyspnea (increased with activity), daily morning hemoptysis, and increased BLE edema.  Also noted to have persistent, increased bilateral pleural effusions, diffuse bilateral groundglass changes, and more focal appearing LLL infiltrates on imaging.  Treating with aggressive diuresis with some improvement in symptoms and hypoxia.  Hemoptysis resolved, mild hypoxia and ANAYA persisting.  S/p right pigtail chest tube and aspiration of left pleural effusion () with subsequent pigtail chest tube (9/15) with lytic therapy.  Output and imaging relatively stable.  Worsening mentation and tremors noted .  Also, with worsening hypercapnia, treated with NIPPV for several days.  AMS resolved, likely d/t ABX for UTI as elevated BUN has not changed, additional workup unremarkable.     Today's recommendations:  - Continue to defer NIPPV, repeat VBG prn with change in mentation/clinical status  - CXR daily while chest tube remains,clamp today. CXR tomorrow.  - Tacrolimus level ordered .  - Prednisone taper due 10/13.  - VGCV for CMV ppx through  (do not recommend dosing to Cystatin C lab), monitor CMV every other week upon completion of VGCV  - DSA next due 10/5.  -  EBV ordered 10/7, IgG ordered 9/29.  - Tunneled line placed on 9/26.  - Repeat gastric emptying study to evaluate for ability to transition medications to PO and progress diet, likely 9/29.  - Repeat EGD 10/13 to check healing of ulcer, sooner if hgb declines further     S/p bilateral sequential lung transplant (BSLT) for end stage COPD:  Acute hypoxia with chronic hypercapnia:   Pulmonary edema:  Persistent bibasilar pleural effusions:   Right hemidiaphragm palsy: Admitted with increased dyspnea with minimal exertion and small volume daily hemoptysis.  Also with marked decline in PFTs (FEV1 1.22L, 50% on 8/18 to 0.98L, 40% on 9/7).  Chest CT (9/8) with increased effusions and groundglass changes.  DSA positive but stable (as below).  BNP markedly elevated (30k) and also with increased BLE edema.  Etiology unclear and is likely multi-factorial with DDx including pulmonary edema from hypervolemia/progressive HFpEF, rejection (ACR +/- AMR), alveolar hemorrhage, and/or infection.  Aggressively diuresed with some improvement in symptoms.  Bronchoscopy (9/13) unremarkable, BAL of lingula sent, cultures no growth (GPC on gram stain only).  S/p right pigtail chest tube placement (9/13-9/22), transudative with moderate output initially but quickly decreasing, cultures negative.  S/p aspiration of left pleural effusion (9/13) and then pigtail chest tube (9/15) s/p full lytic course (916-9/19), cultures also negative.  Chest CT (9/21) with decreased pleural effusions with continued prominent loculated component of right effusion and overall slight decrease in associated atelectasis.  Still with some ANAYA but no further hemoptysis since ~9/12.  Placed on continuous BiPAP 9/19-9/22 due to worsening hypercapnia and mentation, now improving.  Unable to collect sputum sample, respiratory ABX coverage deferred.  Weaned to RA at rest on 9/24.  - Sputum culture still needs collection, defer pulmonary ABX at this time  - Supplemental  O2 to keep >92%, IS and Aerobika q1h w/a and encourage OOB during the day as much as able  - Continue to defer NIPPV, repeat VBG prn with change in mentation/clinical status.  - Left chest tube is at water seal (9/21). Plan to clamp it and re-image tomorrow.  - CXR daily while chest tube remains, remains stable with small left basilar effusion (personally reviewed) but chest tube with continuous air leak today (new)    Immunosuppression:  - Tacrolimus 4.5 mg qAM / 4 mg qPM (increased 9/19).  Goal level 8-12.  Repeat level 9/29 (needs to be ordered).  - Azathioprine 100 mg daily (9/20, MMF stopped due to ongoing diarrhea)  - Prednisone 10 mg qAM / 7.5 mg qPM with next taper due 10/13 (not yet ordered), defer accelerated taper (with elevated BUN) at this time  Date AM dose (mg) PM dose (mg)   9/15/22 10 7.5   10/13/22 7.5 7.5   11/10/22 7.5 5   12/8/22 5 5   1/5/23 5 2.5      Prophylaxis:   - Dapsone qMWF for PJP ppx (resumed 9/19, monitor for worsening anemia; Bactrim stopped d/t hyperkalemia, will need to monitor for recurrence of anemia with dapsone; Pentamidine neb not tolerated on 9/7 after only 5 minutes d/t excessive coughing)  - VGCV (renally dose, do not recommend dosing to Cystatin C lab) for CMV ppx through 9/28, D+/R+, CMV (9/13) negative, monitor CMV every other week upon completion of VGCV     Positive DSA: Newly positive on 8/10 with DQB2 mfi 2155.   - Monitoring [id number], next due 10/5, see below for trend:  Date DQB2 Notes   8/10 2155     9/1 7033 Current peak   9/21 5390        EBV viremia: Low level (1374 on 9/7), not likely to be clinically significant.  - Follow EBV monthly (10/7, ordered)     Hypogammaglobulinemia: IgG adequate at time of transplant, repeat level low (336) on 7/28.  S/p IVIG 7/30, tolerated well.  IgG on 8/29 adequate at 672.   - Follow IgG monthly (9/29, ordered)     Other relevant problems being managed by the primary team:     Encephalopathy:  Tremors:   Presumed UTI: Noted  9/19 with confusion as well as tremor.  DDx including hypercapnia, hyperammonia, infection, uremia (see below), medication related.  VBG with worsening hypercapnia (99).  BUN elevated (112).  Ammonia normal (24).  CRP normal, procal 0.1.  UA with moderate blood, large leukocyte esterase, 3 RBC, 29 WBC, and many bacteria.  Urine culture (9/19) with mixture of urogenital josue.  Tacrolimus levels recently were subtherapeutic.  Head CT (9/20) without acute intracranial pathology.  Blood culture (9/19) NGTD.  AMS resolved ~9/22.  S/p ceftriaxone 5-day course (9/20-9/24).     Likely CKD5 (in light of recent cystatin C): Nephrology consulted 9/21, see their note for details, with concern for CKD5 as Cr may suggest overestimation of kidney function with limited muscle mass, unclear if trend over the past week reflects BACILIO.  Cystatin C 4.3 with GFR 10.  Making urine.  Renal US (9/22) normal.  HD trial discussed with pt. and daughter on 9/22, pt. considering.  - Nephrology following, considering HD trial, tunneled line placed on 9/26  - Starting iHD on 9/26/2022.     Diarrhea: C diff negative on 9/10 and 9/20.  Likely medication related (MMF), but unable to transition to Myfortic given NPO.  Loperamide utilized with some benefit.  Goal to titrate to antidiarrheal 3 stools daily, management per primary.  Enteric stool panel negative 9/16.  Transitioned from MMF to AZA as above.      Severe gastroparesis:   Pyloric ulcer: Gastric emptying study 7/20 with severe gastric emptying delay (95% retention at 4 hours), s/p GJ tube placement in IR 7/27.  S/p EGD (8/3) noted to have pyloric ulcer and excessive gastric fluid and residual food.  S/p EGD (8/11) with mild erythema 2/2 GJ tube trauma seen near insertion site but no active bleeding.  - PPI BID  - TF continuous and ALL enteral medications via J tube  - G tube to gravity drainage; full liquid diet for comfort only  - Repeat gastric emptying study to evaluate for ability to  "transition medications to PO and progress diet, needs to be scheduled in the next few days.  - Repeat EGD 10/13 to check healing of ulcer, sooner if hgb declines further     We appreciate the excellent care provided by the [place] [id number] team.  Recommendations communicated via in person rounding and this note.  Will continue to follow along closely, please do not hesitate to call with any questions or concerns.     Subjective & Interval History:     Intermittently on 1L NC with sleep, otherwise on RA including activity over the last 24hrs. She has noted cough when her chesttube is put back on suction after travel. No sputum.  C/o notable pain at left chest tube site, primarily with movement and interfering sleep quality.  Chest tube output improving nicely (70 mL yesterday) but with new air leak today despite stable imaging and chest tube positioning.  Peripheral edema is stable.  - First day of iHD today.    Review of Systems:     C: No fever, no chills, no change in weight, no change in appetite  INTEGUMENTARY/SKIN: No rash or obvious new lesions  ENT/MOUTH: No sore throat, no sinus pain, no nasal congestion or drainage  RESP: See interval history  CV: No chest pain, no palpitations, no orthopnea  GI: No nausea, no vomiting, + stable loose stools, no reflux symptoms  : No dysuria  MUSCULOSKELETAL: No myalgias, no arthralgias  ENDOCRINE: Blood sugars with adequate control  NEURO: + occ headache, no numbness or tingling  PSYCHIATRIC: Mood stable    Physical Exam:     All notes, images, and labs from past 24 hours (at minimum) were reviewed.    Vital signs:  Temp: 97.3  F (36.3  C) Temp src: Oral BP: 122/72 Pulse: 93   Resp: 13 SpO2: 97 % O2 Device: Nasal cannula Oxygen Delivery: 1 LPM Height: 158.8 cm (5' 2.5\") Weight: 70.6 kg (155 lb 9.6 oz)  I/O:     Intake/Output Summary (Last 24 hours) at 9/26/2022 1411  Last data filed at 9/26/2022 0828  Gross per 24 hour   Intake 835 ml   Output 875 ml   Net -40 ml "     Constitutional: Sitting up in a chair, in no apparent distress.   HEENT: Eyes with pink conjunctivae, anicteric.  Oral mucosa moist without lesions.  PULM: Diminished bases bilaterally L>R.  No crackles, no rhonchi, no wheezes.  Non-labored breathing on RA.  Left chest tube with air leak on WS.  CV: Normal S1 and S2.  RRR.  + faint murmur, gallop, or rub.  1+ RLE and 2+ LLE edema.  ABD: NABS, soft, nontender, nondistended.  PEG/J tube site with scant drainage.  MSK: Moves all extremities.  No apparent muscle wasting.   NEURO: Alert and oriented, conversant.  + mild tremors (minimal).  SKIN: Warm, dry.  No rash on limited exam.   PSYCH: Mood stable.     Lines, Drains, and Devices:  Peripheral IV 09/10/22 Anterior;Left Lower forearm (Active)   Site Assessment WDL 09/25/22 0930   Line Status Saline locked 09/25/22 0930   Dressing Intervention New dressing  09/10/22 0135   Phlebitis Scale 0-->no symptoms 09/25/22 0930   Infiltration Scale 0 09/25/22 0930   Number of days: 15     Data:     LABS    CMP:   Recent Labs   Lab 09/26/22  1139 09/26/22  0913 09/26/22  0555 09/26/22  0414 09/25/22  0933 09/25/22  0736 09/24/22  1026 09/24/22  0615 09/23/22  0943 09/23/22  0536 09/22/22  1149 09/22/22  0551 09/20/22  1012 09/20/22  0701   NA  --   --  137  --   --   --   --  141  --  140  --  139   < > 141   POTASSIUM  --   --  4.9  --   --   --   --  4.1  --  5.0  --  4.5   < > 4.3   CHLORIDE  --   --  87*  --   --   --   --  89*  --  87*  --  89*   < > 87*   CO2  --   --  44*  --   --   --   --  44*  --  50*  --  46*   < > 46*   ANIONGAP  --   --  6*  --   --   --   --  8  --  3*  --  4*   < > 8   * 194* 148* 157*   < >  --    < > 163*   < > 179*   < > 160*   < > 163*   BUN  --   --  96.7*  --   --   --   --  99.5*  --  102.0*  --  106.0*   < > 112.0*   CR  --   --  1.72*  --   --   --   --  1.59*  --  1.63*  --  1.63*   < > 1.78*   GFRESTIMATED  --   --  33*  --   --   --   --  37*  --  36*  --  36*   < > 32*   ESTUARDO   --   --  9.4  --   --   --   --  9.5  --  9.8  --  9.4   < > 9.0   MAG  --   --  2.7*  --   --  2.7*  --  2.8*  --  2.8*  --   --    < > 2.8*   PHOS  --   --  3.7  --   --   --   --   --   --   --   --   --   --  4.0   PROTTOTAL  --   --  5.4*  --   --   --   --  5.3*  --  5.6*  --  5.6*   < > 5.2*   ALBUMIN  --   --  3.1*  --   --   --   --  3.0*  --  3.3*  --  3.1*   < > 3.1*   BILITOTAL  --   --  0.2  --   --   --   --  <0.2  --  0.2  --  <0.2   < > <0.2   ALKPHOS  --   --  79  --   --   --   --  87  --  89  --  84   < > 75   AST  --   --  25  --   --   --   --  17  --  25  --  18   < > 15   ALT  --   --  11  --   --   --   --  10  --  12  --  <5*   < > 9*    < > = values in this interval not displayed.     CBC:   Recent Labs   Lab 09/26/22  0555 09/24/22  0615 09/23/22  0536 09/22/22  0551   WBC 8.5 7.1 6.4 6.8   RBC 2.65* 2.55* 2.55* 2.63*   HGB 8.2* 7.9* 8.0* 8.3*   HCT 27.4* 26.9* 26.8* 27.8*   * 106* 105* 106*   MCH 30.9 31.0 31.4 31.6   MCHC 29.9* 29.4* 29.9* 29.9*   RDW 19.9* 19.6* 19.3* 19.4*    170 207 217       INR: No lab results found in last 7 days.    Glucose:   Recent Labs   Lab 09/26/22  1139 09/26/22  0913 09/26/22  0555 09/26/22  0414 09/25/22  2044 09/25/22  1624   * 194* 148* 157* 165* 195*       Blood Gas:   Recent Labs   Lab 09/22/22  0551 09/21/22  1648 09/21/22  1002   PHV 7.42 7.41 7.46*   PCO2V 82* 82* 72*   PO2V 26 24* 16*   HCO3V 53* 52* 51*   LINDY 23.8* 24.2* -1.7   O2PER 30 1 21       Culture Data No results for input(s): CULT in the last 168 hours.    Virology Data:   Lab Results   Component Value Date    FLUAH1 Not Detected 09/13/2022    FLUAH3 Not Detected 09/13/2022    EJ9537 Not Detected 09/13/2022    IFLUB Not Detected 09/13/2022    RSVA Not Detected 09/13/2022    RSVB Not Detected 09/13/2022    PIV1 Not Detected 09/13/2022    PIV2 Not Detected 09/13/2022    PIV3 Not Detected 09/13/2022    HMPV Not Detected 09/13/2022       Historical CMV results (last 3 of  prior testing):  Lab Results   Component Value Date    CMVQNT Not Detected 09/13/2022    CMVQNT Not Detected 09/07/2022    CMVQNT Not Detected 09/01/2022     No results found for: CMVLOG    Urine Studies    Recent Labs   Lab Test 09/19/22  2254 08/01/22  0319 07/08/22  0831   URINEPH 7.0 8.0* 5.5   NITRITE Negative Negative Negative   LEUKEST Large* Negative Negative   WBCU 29*  --  1       Most Recent [place] Pulmonary Function Testing (FVC/FEV1 only)  FVC-Pre   Date Value Ref Range Status   09/07/2022 1.01 L    09/01/2022 1.07 L    08/24/2022 1.23 L    08/18/2022 1.33 L      FVC-%Pred-Pre   Date Value Ref Range Status   09/07/2022 33 %    09/01/2022 35 %    08/24/2022 40 %    08/18/2022 43 %      FEV1-Pre   Date Value Ref Range Status   09/07/2022 0.98 L    09/01/2022 1.02 L    08/24/2022 1.17 L    08/18/2022 1.22 L      FEV1-%Pred-Pre   Date Value Ref Range Status   09/07/2022 40 %    09/01/2022 42 %    08/24/2022 48 %    08/18/2022 50 %        IMAGING    Recent Results (from the past 48 hour(s))   XR Chest 2 Views    Narrative    Exam: XR CHEST 2 VIEWS, 9/24/2022 9:53 AM    Indication: interval follow up, chest tubes, lung transplant history    Comparison: 9/23/2022, 9/21/2022    Findings:   Surgical changes of bilateral lung transplantation with clamshell  sternotomy wires and mediastinal surgical clips. Stable left pigtail  chest tube. Stable bilateral loculated pleural effusions, left greater  than right. No appreciable pneumothorax with resolution of pleural air  on the left. Stable mild streaky perihilar and medial bibasilar  opacities.      Impression    Impression: No significant change in small left greater than right  loculated pleural effusions with left chest tube in place. No  pneumothorax.    CECI REGAN DO         SYSTEM ID:  [id number]   XR Chest 2 Views    Narrative    EXAM: XR CHEST 2 VIEWS  9/25/2022 8:14 AM      HISTORY: interval follow up, chest tubes, lung transplant history    COMPARISON:  9/24/2022    FINDINGS: Two views of the chest. Stable position of left basilar  pigtail catheter chest tube. Prior lung transplantation with intact  clam shell sternotomy wires. Stable position of trachea.  Cardiomediastinal silhouette is similar to prior. Stable bilateral  loculated pleural effusions, left greater than right. No appreciable  pneumothorax. Stable streaky perihilar and bibasilar opacities. No new  focal consolidative opacity.      Impression    IMPRESSION:  Stable left greater than right loculated pleural effusions. Stable  left chest tube. No pneumothorax.    I have personally reviewed the examination and initial interpretation  and I agree with the findings.    CECI REGAN DO         SYSTEM ID:  [id number]

## 2022-09-26 NOTE — PROGRESS NOTES
Nephrology Progress Note  09/26/2022     Ms Rodriguez is a 61 yo female with PMH end stage COPD s/p bilateral sequential lung transplant (6/22) on triple IS ( MMF/Tacro/pred), pyloric ulcer, recent ERCP c/f hemobilia, gastroparesis s/p GJ tube placement and Percutaneous J tube , BACILIO requiring short term HD, admitted 9/9/22 for planned admission from transplant pulmonology to work up antibody medicated rejection versus infection. She was found to have ESRD with Cystatin C GFR of 10 ml/mn.      Assessment & Recommendations:     1. ESRD based upon Cystatin C ( GFR of 10) - Creat has remained stable in the mid to upper 1 range, but her BUN was 96 pre run today.    - It was felt that patient's symptoms of fatigue, poor appetite, recent confusion and elevated BUN indicated poor renal function. Given low muscle mass Cystatin C was obtained and found to be 10 ml/mn   - Admission BUN was 82.3. She peaked at 112 on 9/20 and has been down trending slowly but remains out of proportion to her creat.    - Tunneled HD line placed today   - Patient initiating HD today with a short 2 hr run. Will run tomorrow and Wed.    - [facility] will need to begin OP HD search. Patient is staying at the [facility] for another several months, so [employer] would probably work. She will need transportation.     2. Volume status - No significant volume issue. Has chronic diarrhea likely 2/2 TF. Intake 1825.  ml, mixed urine/stool 400 ml, CT 70 ml. Bps 120-140/. Pre run weight 70.6 kg   - No fluid removal on HD   - Please get standing pre run weights   - Continue strict I/O    3. CV - Pre run b/ps 120-140/. No edema. On Metoprolol 12.5 mg bid    4. Lung transplant IS: AZa, Pred, Tac. Tac level 11.6   - Per transplant    5. Electrolytes - No acute concerns. K 4.9, Na 137    6. Acid base - No acute concerns. Bicarb 44    7. BMD - Ca 9.4, Phos 3.7, albumin 3.1   - Check PTH/Vit D   - Continue Ca/D    8. Anemia - Hgb 8.2   - Last RBC 8/31   -  "Pyloric ulcer per EGD 8/3/22   - Check iron studies   - On B12, folic acid, PPI   - Will likely begin Epo tomorrow    9. 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  Seen on HD following insertion of tunneled HD catheter  Tolerating w/o problem  Son at bedside    Review of Systems:   I reviewed the following systems:  GI: NPO for procedure. Has J tube for TF  Neuro:  Alert/interactive  Constitutional:  no fever or chills  CV: stable respiratory status. No edema, CP   : Non oliguric    Physical Exam:   I/O last 3 completed shifts:  In: 1515 [P.O.:180; NG/GT:655]  Out: 1075 [Urine:850; Other:150; Chest Tube:75]   /72   Pulse 96   Temp 97.3  F (36.3  C) (Oral)   Resp 16   Ht 1.588 m (5' 2.5\")   Wt 70.6 kg (155 lb 9.6 oz)   SpO2 98%   BMI 28.01 kg/m       GENERAL APPEARANCE: Comfortable on HD. Son present  EYES:  no scleral icterus, pupils equal  PULM: lungs CTA. Breathing is non labored. On supplemental oxygen. Chest tube present  CV: tachy      -edema- none   GI: soft, NT, Non distended.   INTEGUMENT: no  rash  NEURO:  Alert/interactive  Access - Right TDC    Labs:   All labs reviewed by me  Electrolytes/Renal - Recent Labs   Lab Test 09/26/22  1139 09/26/22  0913 09/26/22  0555 09/25/22  0933 09/25/22  0736 09/24/22  1026 09/24/22  0615 09/23/22  0943 09/23/22  0536 09/20/22  1012 09/20/22  0701 09/19/22  0817 09/19/22  0546   NA  --   --  137  --   --   --  141  --  140   < > 141  --  140   POTASSIUM  --   --  4.9  --   --   --  4.1  --  5.0   < > 4.3  --  4.5   CHLORIDE  --   --  87*  --   --   --  89*  --  87*   < > 87*  --  89*   CO2  --   --  44*  --   --   --  44*  --  50*   < > 46*  --  47*   BUN  --   --  96.7*  --   --   --  " 99.5*  --  102.0*   < > 112.0*  --  107.0*   CR  --   --  1.72*  --   --   --  1.59*  --  1.63*   < > 1.78*  --  1.78*   * 194* 148*   < >  --    < > 163*   < > 179*   < > 163*   < > 176*   ESTUARDO  --   --  9.4  --   --   --  9.5  --  9.8   < > 9.0  --  9.3   MAG  --   --  2.7*  --  2.7*  --  2.8*  --  2.8*   < > 2.8*  --  2.7*   PHOS  --   --  3.7  --   --   --   --   --   --   --  4.0  --  3.9    < > = values in this interval not displayed.       CBC -   Recent Labs   Lab Test 09/26/22  0555 09/24/22  0615 09/23/22  0536   WBC 8.5 7.1 6.4   HGB 8.2* 7.9* 8.0*    170 207       LFTs -   Recent Labs   Lab Test 09/26/22  0555 09/24/22  0615 09/23/22  0536   ALKPHOS 79 87 89   BILITOTAL 0.2 <0.2 0.2   ALT 11 10 12   AST 25 17 25   PROTTOTAL 5.4* 5.3* 5.6*   ALBUMIN 3.1* 3.0* 3.3*       Iron Panel -   Recent Labs   Lab Test 09/03/22  1039 08/24/22  0810 07/21/22  0122   IRON 21* 41 31*   IRONSAT 9* 21 11*   CARLOS 343* 334* 189         Imaging:    Current Medications:    azaTHIOprine  100 mg Per J Tube Daily     banatrol plus  1 packet Per Feeding Tube BID     calcium carbonate 600 mg-vitamin D 400 units  1 tablet Per J Tube BID w/meals     cyanocobalamin  500 mcg Per Feeding Tube Daily     dapsone  50 mg Per J Tube Q Mon Wed Fri AM     folic acid  1 mg Oral or Feeding Tube Daily     gabapentin  200 mg Oral At Bedtime     sodium chloride (PF) 0.9%  1.3-2.6 mL Intracatheter Once    Followed by     heparin  3 mL Intracatheter Once     sodium chloride (PF) 0.9%  1.3-2.6 mL Intracatheter Once    Followed by     heparin  3 mL Intracatheter Once     [Held by provider] heparin ANTICOAGULANT  5,000 Units Subcutaneous Q12H     heparin Lock (1000 units/mL High concentration)  3 mL Intracatheter Once     insulin aspart  1-6 Units Subcutaneous Q6H     [Held by provider] insulin glargine  6 Units Subcutaneous QAM AC     lidocaine  2 patch Transdermal Q24h    And     lidocaine   Transdermal Q8H TONY     menthol  1 patch  Topical QAM    And     menthol   Transdermal Q8H     menthol   Transdermal Daily     metoprolol  12.5 mg Per J Tube BID     multivitamins w/minerals  15 mL Per Feeding Tube Daily     nystatin  1,000,000 Units Swish & Swallow 4x Daily     pantoprazole  40 mg Per J Tube BID     prednisoLONE  10 mg Per J Tube Daily     prednisoLONE  7.5 mg Per J Tube QPM     sodium chloride (PF)  3 mL Intracatheter Q8H     sodium chloride (PF)  9 mL Intracatheter During Dialysis/CRRT (from stock)     sodium chloride (PF)  9 mL Intracatheter During Dialysis/CRRT (from stock)     tacrolimus  4 mg Per J Tube QPM     tacrolimus  4.5 mg Per J Tube QAM     valGANciclovir  450 mg Oral or Feeding Tube Once per day on Mon Wed Fri       dextrose       - MEDICATION INSTRUCTIONS -       - MEDICATION INSTRUCTIONS -       Emili Nolasco, NP

## 2022-09-26 NOTE — PROCEDURES
[facility]    Procedure: IR Procedure Note    Date/Time: 9/26/2022 10:33 AM  Performed by: Jannet Gonzalez PA-C  Authorized by: Jannet Gonzalez PA-C       UNIVERSAL PROTOCOL   Site Marked: NA  Prior Images Obtained and Reviewed:  Yes  Required items: Required blood products, implants, devices and special equipment available    Patient identity confirmed:  Verbally with patient, arm band, provided demographic data and hospital-assigned identification number  Patient was reevaluated immediately before administering moderate or deep sedation or anesthesia  Confirmation Checklist:  Patient's identity using two indicators, relevant allergies, procedure was appropriate and matched the consent or emergent situation and correct equipment/implants were available  Time out: Immediately prior to the procedure a time out was called    Universal Protocol: the Joint Commission Universal Protocol was followed    Preparation: Patient was prepped and draped in usual sterile fashion       ANESTHESIA    Anesthesia: Local infiltration  Local Anesthetic:  Lidocaine 1% without epinephrine      SEDATION  Patient Sedated: Yes    Sedation:  Fentanyl and midazolam  Vital signs: Vital signs monitored during sedation    See dictated procedure note for full details.  Findings: 1 mg versed, 50 mcg fentanyl    Specimens: none    Complications: None    Condition: Stable      PROCEDURE  Describe Procedure:  Completed placement of 14.5 Divehi, 23 cm double lumen tunneled central venous catheter via right internal jugular vein. Aspirates and flushes freely, heparin locked and ready for immediate use.    Patient Tolerance:  Patient tolerated the procedure well with no immediate complications  Length of time physician/provider present for 1:1 monitoring during sedation: 20

## 2022-09-27 ENCOUNTER — APPOINTMENT (OUTPATIENT)
Dept: OCCUPATIONAL THERAPY | Facility: CLINIC | Age: 62
DRG: 205 | End: 2022-09-27
Attending: NURSE PRACTITIONER
Payer: MEDICARE

## 2022-09-27 ENCOUNTER — APPOINTMENT (OUTPATIENT)
Dept: GENERAL RADIOLOGY | Facility: CLINIC | Age: 62
DRG: 205 | End: 2022-09-27
Attending: INTERNAL MEDICINE
Payer: MEDICARE

## 2022-09-27 ENCOUNTER — APPOINTMENT (OUTPATIENT)
Dept: CT IMAGING | Facility: CLINIC | Age: 62
DRG: 205 | End: 2022-09-27
Attending: PEDIATRICS
Payer: MEDICARE

## 2022-09-27 LAB
ALBUMIN SERPL BCG-MCNC: 3.1 G/DL (ref 3.5–5.2)
ALP SERPL-CCNC: 85 U/L (ref 35–104)
ALT SERPL W P-5'-P-CCNC: 12 U/L (ref 10–35)
ANION GAP SERPL CALCULATED.3IONS-SCNC: 5 MMOL/L (ref 7–15)
AST SERPL W P-5'-P-CCNC: 21 U/L (ref 10–35)
BASOPHILS # BLD AUTO: 0 10E3/UL (ref 0–0.2)
BASOPHILS NFR BLD AUTO: 0 %
BILIRUB SERPL-MCNC: 0.2 MG/DL
BUN SERPL-MCNC: 63.4 MG/DL (ref 8–23)
CALCIUM SERPL-MCNC: 9 MG/DL (ref 8.8–10.2)
CHLORIDE SERPL-SCNC: 91 MMOL/L (ref 98–107)
CREAT SERPL-MCNC: 1.48 MG/DL (ref 0.51–0.95)
DEPRECATED CALCIDIOL+CALCIFEROL SERPL-MC: <48 UG/L (ref 20–75)
DEPRECATED HCO3 PLAS-SCNC: 39 MMOL/L (ref 22–29)
EOSINOPHIL # BLD AUTO: 0.1 10E3/UL (ref 0–0.7)
EOSINOPHIL NFR BLD AUTO: 1 %
ERYTHROCYTE [DISTWIDTH] IN BLOOD BY AUTOMATED COUNT: 19.9 % (ref 10–15)
GFR SERPL CREATININE-BSD FRML MDRD: 40 ML/MIN/1.73M2
GLUCOSE BLDC GLUCOMTR-MCNC: 154 MG/DL (ref 70–99)
GLUCOSE BLDC GLUCOMTR-MCNC: 166 MG/DL (ref 70–99)
GLUCOSE BLDC GLUCOMTR-MCNC: 179 MG/DL (ref 70–99)
GLUCOSE BLDC GLUCOMTR-MCNC: 188 MG/DL (ref 70–99)
GLUCOSE SERPL-MCNC: 173 MG/DL (ref 70–99)
HCT VFR BLD AUTO: 26.3 % (ref 35–47)
HGB BLD-MCNC: 7.9 G/DL (ref 11.7–15.7)
IMM GRANULOCYTES # BLD: 0.4 10E3/UL
IMM GRANULOCYTES NFR BLD: 4 %
LYMPHOCYTES # BLD AUTO: 0.4 10E3/UL (ref 0.8–5.3)
LYMPHOCYTES NFR BLD AUTO: 5 %
MAGNESIUM SERPL-MCNC: 2.3 MG/DL (ref 1.7–2.3)
MCH RBC QN AUTO: 31.5 PG (ref 26.5–33)
MCHC RBC AUTO-ENTMCNC: 30 G/DL (ref 31.5–36.5)
MCV RBC AUTO: 105 FL (ref 78–100)
MONOCYTES # BLD AUTO: 0.6 10E3/UL (ref 0–1.3)
MONOCYTES NFR BLD AUTO: 7 %
NEUTROPHILS # BLD AUTO: 6.9 10E3/UL (ref 1.6–8.3)
NEUTROPHILS NFR BLD AUTO: 83 %
NRBC # BLD AUTO: 0 10E3/UL
NRBC BLD AUTO-RTO: 0 /100
PHOSPHATE SERPL-MCNC: 3.5 MG/DL (ref 2.5–4.5)
PLATELET # BLD AUTO: 217 10E3/UL (ref 150–450)
POTASSIUM SERPL-SCNC: 4.1 MMOL/L (ref 3.4–5.3)
PROT SERPL-MCNC: 5.2 G/DL (ref 6.4–8.3)
PTH-INTACT SERPL-MCNC: 46 PG/ML (ref 15–65)
RBC # BLD AUTO: 2.51 10E6/UL (ref 3.8–5.2)
SODIUM SERPL-SCNC: 135 MMOL/L (ref 136–145)
VITAMIN D2 SERPL-MCNC: <5 UG/L
VITAMIN D3 SERPL-MCNC: 43 UG/L
WBC # BLD AUTO: 8.4 10E3/UL (ref 4–11)

## 2022-09-27 PROCEDURE — 250N000013 HC RX MED GY IP 250 OP 250 PS 637: Performed by: NURSE PRACTITIONER

## 2022-09-27 PROCEDURE — 250N000013 HC RX MED GY IP 250 OP 250 PS 637: Performed by: STUDENT IN AN ORGANIZED HEALTH CARE EDUCATION/TRAINING PROGRAM

## 2022-09-27 PROCEDURE — 250N000013 HC RX MED GY IP 250 OP 250 PS 637: Performed by: INTERNAL MEDICINE

## 2022-09-27 PROCEDURE — 85025 COMPLETE CBC W/AUTO DIFF WBC: CPT | Performed by: INTERNAL MEDICINE

## 2022-09-27 PROCEDURE — 214N000001 HC R&B CCU UMMC

## 2022-09-27 PROCEDURE — 250N000013 HC RX MED GY IP 250 OP 250 PS 637

## 2022-09-27 PROCEDURE — 36415 COLL VENOUS BLD VENIPUNCTURE: CPT

## 2022-09-27 PROCEDURE — 83735 ASSAY OF MAGNESIUM: CPT | Performed by: INTERNAL MEDICINE

## 2022-09-27 PROCEDURE — 71250 CT THORAX DX C-: CPT | Mod: 26 | Performed by: RADIOLOGY

## 2022-09-27 PROCEDURE — 90937 HEMODIALYSIS REPEATED EVAL: CPT

## 2022-09-27 PROCEDURE — 71046 X-RAY EXAM CHEST 2 VIEWS: CPT | Mod: 26 | Performed by: RADIOLOGY

## 2022-09-27 PROCEDURE — 250N000012 HC RX MED GY IP 250 OP 636 PS 637: Performed by: NURSE PRACTITIONER

## 2022-09-27 PROCEDURE — G1010 CDSM STANSON: HCPCS

## 2022-09-27 PROCEDURE — 84100 ASSAY OF PHOSPHORUS: CPT | Performed by: INTERNAL MEDICINE

## 2022-09-27 PROCEDURE — 634N000001 HC RX 634

## 2022-09-27 PROCEDURE — 99233 SBSQ HOSP IP/OBS HIGH 50: CPT | Mod: 24

## 2022-09-27 PROCEDURE — 80053 COMPREHEN METABOLIC PANEL: CPT

## 2022-09-27 PROCEDURE — 250N000011 HC RX IP 250 OP 636: Performed by: STUDENT IN AN ORGANIZED HEALTH CARE EDUCATION/TRAINING PROGRAM

## 2022-09-27 PROCEDURE — 71046 X-RAY EXAM CHEST 2 VIEWS: CPT

## 2022-09-27 PROCEDURE — 258N000003 HC RX IP 258 OP 636

## 2022-09-27 PROCEDURE — G1010 CDSM STANSON: HCPCS | Mod: GC | Performed by: RADIOLOGY

## 2022-09-27 PROCEDURE — 99233 SBSQ HOSP IP/OBS HIGH 50: CPT | Performed by: PEDIATRICS

## 2022-09-27 PROCEDURE — 99233 SBSQ HOSP IP/OBS HIGH 50: CPT | Mod: 24 | Performed by: PHYSICIAN ASSISTANT

## 2022-09-27 PROCEDURE — 97140 MANUAL THERAPY 1/> REGIONS: CPT | Mod: GO | Performed by: OCCUPATIONAL THERAPIST

## 2022-09-27 PROCEDURE — 250N000009 HC RX 250: Performed by: INTERNAL MEDICINE

## 2022-09-27 RX ADMIN — OXYCODONE HYDROCHLORIDE 5 MG: 5 SOLUTION ORAL at 16:34

## 2022-09-27 RX ADMIN — LOPERAMIDE HYDROCHLORIDE 2 MG: 2 CAPSULE ORAL at 12:58

## 2022-09-27 RX ADMIN — INSULIN ASPART 1 UNITS: 100 INJECTION, SOLUTION INTRAVENOUS; SUBCUTANEOUS at 21:16

## 2022-09-27 RX ADMIN — OXYCODONE HYDROCHLORIDE 5 MG: 5 SOLUTION ORAL at 08:32

## 2022-09-27 RX ADMIN — EPOETIN ALFA-EPBX 5000 UNITS: 10000 INJECTION, SOLUTION INTRAVENOUS; SUBCUTANEOUS at 12:05

## 2022-09-27 RX ADMIN — GABAPENTIN 200 MG: 250 SOLUTION ORAL at 21:11

## 2022-09-27 RX ADMIN — Medication 40 MG: at 08:33

## 2022-09-27 RX ADMIN — INSULIN ASPART 1 UNITS: 100 INJECTION, SOLUTION INTRAVENOUS; SUBCUTANEOUS at 04:58

## 2022-09-27 RX ADMIN — NYSTATIN 1000000 UNITS: 100000 SUSPENSION ORAL at 16:42

## 2022-09-27 RX ADMIN — INSULIN ASPART 1 UNITS: 100 INJECTION, SOLUTION INTRAVENOUS; SUBCUTANEOUS at 09:51

## 2022-09-27 RX ADMIN — Medication 100 MG: at 12:55

## 2022-09-27 RX ADMIN — Medication 1 PACKET: at 19:13

## 2022-09-27 RX ADMIN — NYSTATIN 1000000 UNITS: 100000 SUSPENSION ORAL at 08:46

## 2022-09-27 RX ADMIN — NYSTATIN 1000000 UNITS: 100000 SUSPENSION ORAL at 13:18

## 2022-09-27 RX ADMIN — OXYCODONE HYDROCHLORIDE 5 MG: 5 SOLUTION ORAL at 00:03

## 2022-09-27 RX ADMIN — PREDNISOLONE 10 MG: 15 SOLUTION ORAL at 12:55

## 2022-09-27 RX ADMIN — CYANOCOBALAMIN TAB 500 MCG 500 MCG: 500 TAB at 12:56

## 2022-09-27 RX ADMIN — TACROLIMUS 4.5 MG: 5 CAPSULE ORAL at 08:32

## 2022-09-27 RX ADMIN — NYSTATIN 1000000 UNITS: 100000 SUSPENSION ORAL at 19:21

## 2022-09-27 RX ADMIN — SODIUM CHLORIDE 300 ML: 9 INJECTION, SOLUTION INTRAVENOUS at 09:30

## 2022-09-27 RX ADMIN — ORAL VEHICLES - SUSP 12.5 MG: SUSPENSION at 19:14

## 2022-09-27 RX ADMIN — INSULIN ASPART 1 UNITS: 100 INJECTION, SOLUTION INTRAVENOUS; SUBCUTANEOUS at 16:41

## 2022-09-27 RX ADMIN — ORAL VEHICLES - SUSP 12.5 MG: SUSPENSION at 12:55

## 2022-09-27 RX ADMIN — Medication 1 PACKET: at 12:56

## 2022-09-27 RX ADMIN — MULTIVITAMIN 15 ML: LIQUID ORAL at 12:57

## 2022-09-27 RX ADMIN — CALCIUM CARBONATE 600 MG (1,500 MG)-VITAMIN D3 400 UNIT TABLET 1 TABLET: at 19:14

## 2022-09-27 RX ADMIN — CALCIUM CARBONATE 600 MG (1,500 MG)-VITAMIN D3 400 UNIT TABLET 1 TABLET: at 12:55

## 2022-09-27 RX ADMIN — HEPARIN SODIUM 5000 UNITS: 5000 INJECTION, SOLUTION INTRAVENOUS; SUBCUTANEOUS at 19:20

## 2022-09-27 RX ADMIN — FOLIC ACID 1 MG: 1 TABLET ORAL at 12:56

## 2022-09-27 RX ADMIN — LOPERAMIDE HYDROCHLORIDE 2 MG: 2 CAPSULE ORAL at 16:30

## 2022-09-27 RX ADMIN — Medication 40 MG: at 19:14

## 2022-09-27 RX ADMIN — Medication: at 09:30

## 2022-09-27 RX ADMIN — TACROLIMUS 4 MG: 5 CAPSULE ORAL at 19:13

## 2022-09-27 RX ADMIN — PREDNISOLONE 7.5 MG: 15 SOLUTION ORAL at 19:13

## 2022-09-27 RX ADMIN — SODIUM CHLORIDE 250 ML: 9 INJECTION, SOLUTION INTRAVENOUS at 09:40

## 2022-09-27 ASSESSMENT — ACTIVITIES OF DAILY LIVING (ADL)
ADLS_ACUITY_SCORE: 33
ADLS_ACUITY_SCORE: 29
ADLS_ACUITY_SCORE: 33

## 2022-09-27 NOTE — PROGRESS NOTES
[facility]    Medicine Progress Note - Hospitalist Service, GOLD TEAM 10    Date of Admission:  9/9/2022    Assessment & Plan        62 year old female with pertinent hx of end stage COPD s/p bilateral sequential lung transplant (6/22) on triple IS ( MMF/Tacro/pred), pyloric ulcer, recent ERCP c/f hemobilia, gastroparesis s/p GJ tube placement and Percutaneous J tube presents for a planned admission from transplant pulmonology to work up antibody medicated rejection versus infection.     #Acute kidney injury AKIN stage II on CKD stage IIIb, all are present on admission  :: Patient has been variable GFRs 30-50s in the last few months. Most recent Cr trend 1.5-1.9 1.56 9/8/22 cystatin C obtained and GFR ~10; discussed with Renal team; ultimately decision to give trial of iHD and monitor for renal recovery  :: trend BMP strict input and output and daily body weight  :: Renal consult, cont iHD for now; uneventful placement of tunneled catheter by IR on 9/26. first dialysis on 9/26.  Plan for dialysis on 9/27 and 9/28 then to assess for the need for further dialysis afterwards.   :: Vitamin D level pending;  parathyroid hormone = 46; c/w CA and vit D for now     #Diarrhea likely secondary to tube feeding, not present on admission  The patient was repeatedly ruled out for C. difficile colitis.  I consulted with adult dietitian to assist with management of diarrhea related to tube feeding.  Continue fibers at 28 g and Imodium as needed.     #Acute hypoxic hypercarbic respiratory failure secondary to bibasilar pleural effusion currently with chest tubes bilaterally on top of end-stage COPD s/p bilateral sequential lung transplant on 6/22/2022 complicated by chronic respiratory acidosis with compensation by metabolic alkalosis, all are present on admission   This is the main problem that led to this admission.  The patient has bilateral chest tubes.  The patient had her  right-sided chest tube removed on 9/22/2022.  -Left-sided chest tube is clamped with chest x-ray to be obtained on 9/27 for consideration of removal of left-sided chest tube  -DSA testing on 10/5/2022  -IgG level ordered for 9/29  -Vibha-Barr virus quantitative was ordered for 10/7/2022  -Continue 3 drug immunosuppression with azathioprine, prednisone, and tacrolimus  -Prednisone taper to be performed on 10/13/2022  -Continue oxycodone at 5 mg every 4 hours as needed for chest pain related to left-sided chest tube  -The patient declined any further lidocaine patches since 2 patches of lidocaine daily did not help with the pain  -Started Robaxin as needed for adjunct therapy for pain management  -Increased gabapentin to 200 mg nightly  -Continue BiPAP as detailed above  -Continue valganciclovir for cytomegalovirus prophylaxis through 9/28/2022 -- renally dosed for eGFR  -Continue dapsone for PJP prophylaxis  -Continue folic acid while on dapsone  -Continue nystatin.  Prophylaxis protocol post lung transplant  -I highly appreciate input of transplant pulmonology service        #Severe gastroparesis s/p  GJ tube placement 7/27/2022  #Significant delayed gastric emptying s/p gastrojejunostomy tube placement, present on admission  We will continue to utilize jejunal tube for nutrition.  I ordered a follow-up nuclear medicine gastric emptying study for evaluation of any hopefully improvement in the patient's gastric emptying.  This to be obtained on 9/2922  - RD nutrition consult placed for TF  - Percutaneous j tube in place with G venting to gravity      #Steroid-induced hyperglycemia and diabetes mellitus  :: continue insulin Lantus with insulin NovoLog sliding scale    #HTN  # A flutter with RVR/SVT  ::  Has recently completed zio patch.  :: Continue PTA metoprolol 50 mg BID    # Acute blood loss anemia secondary to pyloric ulcer on top of chronic anemia of chronic disease, all are present on admission  -Continue  pantoprazole 40 mg twice daily  -Repeat endoscopy for October 2022    #Acute metabolic encephalopathy secondary to urinary tract infection, not clear if present on admission, ordered a total of 5-day course of ceftriaxone for which the patient encephalopathy resolved     # extra hepatic and intrahepatic biliary dilation c/f hemobilia   # Intra ductal papillary mucinous neoplasm   :: s/p ERCP 8/11 showed hemobilia.   :: Monitor LFTs      #Acute on chronic heart failure with preserved ejection fraction LVEF 65%, the patient was appropriately diuresed, follow-up as outpatient              #Diarrhea with plan to trial transition to Myfortic. Resume imodium for now                 Diet: Snacks/Supplements Adult: Nepro Oral Supplement; Between Meals  Adult Formula Drip Feeding: Continuous Nepro with Carbsteady; Jejunostomy; Goal Rate: 40 ml/hr--okay to begin at goal once new formula arrives on unit.; mL/hr; Medication - Feeding Tube Flush Frequency: At least 15-30 mL water before and after med...  Full Liquid Diet    DVT Prophylaxis: Heparin SQ  Dong Catheter: Not present  Central Lines: PRESENT  CVC Double Lumen Right Internal jugular Tunneled-Site Assessment: WDL  Cardiac Monitoring: None  Code Status: Full Code      Disposition Plan      Expected Discharge Date: 10/03/2022,  9:00 AM      Discharge Comments: Decision will need to be made as the patient would need dialysis after discharge or not.  The patient received dialysis on 9/27 and 9/28 while awaiting improvement of her kidney function.  Hopefully the patient would not need dialysis upon discharge. TBD -[facility] She will need [facility].      The patient's care was discussed with the Patient, Patient's Family and Pulmonary Consultant.    Greg Mujica MD  Hospitalist Service, GOLD TEAM [address] [place] [address]  Securely message with  the [employer] Web Console (learn more here)  Text page via [facility] Paging/Directory   Please see signed in provider for up to date coverage information      Clinically Significant Risk Factors Present on Admission                      ______________________________________________________________________    Interval History   The patient reports that she felt okay with dialysis; run today was fine  Noted some pain at CVC site but other than that doing well  Son here today during rounds this afternoon, pt and son both asking many questions which were answered to satisfaction  2 of 3 HD runs completed, appr Renal recs for iHD  No other concerns no FCS no SOB no CP no NVD  Four-point review of systems is otherwise negative.    Data reviewed today: I reviewed all medications, new labs and imaging results over the last 24 hours.     Physical Exam   Vital Signs: Temp: 98.7  F (37.1  C) Temp src: Oral BP: 128/65 Pulse: 91   Resp: 20 SpO2: 97 % O2 Device: None (Room air)    Weight: 155 lbs 6.4 oz  EXAM  General: frail appearing woman sitting up in bed, NAD  Head: NC, AT  Eye: symm gaze, anicteric sclerae  ENT: patent nares wo drainage/epistaxis, MMM  Pulm: CTAB, comfortable WOB on RA  CV: normal rate, reg rhythm; CVC site wo erythema, mildly tender to palp;  GI: soft, NTND  Neuro: awake, alert, hearing speech and phonation, intact grossly           Data   Recent Labs   Lab 09/27/22  1637 09/27/22  0950 09/27/22  0511 09/26/22  0913 09/26/22  0555 09/24/22  1026 09/24/22  0615   WBC  --   --  8.4  --  8.5  --  7.1   HGB  --   --  7.9*  --  8.2*  --  7.9*   MCV  --   --  105*  --  103*  --  106*   PLT  --   --  217  --  224  --  170   NA  --   --  135*  --  137  --  141   POTASSIUM  --   --  4.1  --  4.9  --  4.1   CHLORIDE  --   --  91*  --  87*  --  89*   CO2  --   --  39*  --  44*  --  44*   BUN  --   --  63.4*  --  96.7*  --  99.5*   CR  --   --  1.48*  --  1.72*  --  1.59*   ANIONGAP  --   --  5*  --  6*  --  8   ESTUARDO  --   --   9.0  --  9.4  --  9.5   * 154* 173*   < > 148*   < > 163*   ALBUMIN  --   --  3.1*  --  3.1*  --  3.0*   PROTTOTAL  --   --  5.2*  --  5.4*  --  5.3*   BILITOTAL  --   --  0.2  --  0.2  --  <0.2   ALKPHOS  --   --  85  --  79  --  87   ALT  --   --  12  --  11  --  10   AST  --   --  21  --  25  --  17    < > = values in this interval not displayed.     Recent Results (from the past 24 hour(s))   XR Chest 2 Views    Narrative    EXAM: XR CHEST 2 VIEWS  9/27/2022 1:55 PM     HISTORY:  chest tube - Clamped, assessing Ptx.       COMPARISON:  Chest x-ray 9/26/2022    FINDINGS:   PA and views of the chest. Post surgical changes of bilateral lung  transplant with sternotomy wires. Right IJ central venous catheter tip  projects over the superior cavoatrial junction. Grossly stable  position of left chest pigtail drain. Stable small left pleural  effusion. Small amount of fluid tracking in the right minor fissure.  No appreciable pneumothorax.      Impression    IMPRESSION:   1. No appreciable pneumothorax following clamping of the left chest  tube.  2. Stable small left pleural effusion.    I have personally reviewed the examination and initial interpretation  and I agree with the findings.    GEORGE ROGERS MD         SYSTEM ID:  [id number]   CT Chest w/o Contrast    Narrative    EXAMINATION: Chest CT  9/27/2022 4:13 PM    CLINICAL HISTORY: chest tube and air leak    COMPARISON: CT chest 9/21/2022.    TECHNIQUE: CT imaging obtained through the chest without contrast.  Axial, coronal, and sagittal reconstructions and axial MIP reformatted  images are reviewed.     CONTRAST: None    FINDINGS:  Support devices: Right IJ central venous catheter tip terminates in  the right atrium. Left basilar chest tube is coiled in the inferior  medial left pleural space.    Lungs: Postsurgical changes of bilateral lung transplant. No  significant narrowing on either bronchial anastomosis. Mild adherent  debris in the upper  trachea. The left upper lobe and left lower lobe  are rotated with respect to each other with displacement of the  fissure. The airways otherwise patent. No significant change in the  small bilateral hydropneumothoraces. Small amount of fluid tracking in  the right oblique fissure. Continued resolution of the nodular opacity  in the posterior portion of the left upper lobe, significantly  decreased in size since the 9/8/2022 chest CT. No new airspace  consolidation. Mild interstitial thickening in the lung bases.    Mediastinum: The thyroid is unremarkable. Cardiac size is normal.  Normal caliber aorta and main pulmonary artery. No significant  pericardial effusion. Moderate coronary artery calcium. No thoracic  lymphadenopathy. Esophagus is normal in caliber.    Bones and soft tissues: No suspicious bone findings. Intact clam shell  sternotomy wires.    Upper Abdomen: Limited evaluation of the upper abdomen. Central  pneumobilia.      Impression    IMPRESSION:   1. Grossly unchanged small bilateral hydropneumothoraces with left  chest tube coiled in the inferior medial left pleural space.  2. Continued resolution of the nodular opacity in the posterior left  upper lung. The lobes of the left upper lobe and lower lobe are  rotated with respect to each other. Consider CT with IV contrast to  assess vascular anatomy and patency.    I have personally reviewed the examination and initial interpretation  and I agree with the findings.    JOHANN MORAES MD         SYSTEM ID:  [id number]     Medications     dextrose       - MEDICATION INSTRUCTIONS -       - MEDICATION INSTRUCTIONS -         azaTHIOprine  100 mg Per J Tube Daily     banatrol plus  1 packet Per Feeding Tube BID     calcium carbonate 600 mg-vitamin D 400 units  1 tablet Per J Tube BID w/meals     cyanocobalamin  500 mcg Per Feeding Tube Daily     dapsone  50 mg Per J Tube Q Mon Wed Fri AM     folic acid  1 mg Oral or Feeding Tube Daily     gabapentin  200 mg  Oral At Bedtime     sodium chloride (PF) 0.9%  1.3-2.6 mL Intracatheter Once    Followed by     heparin  3 mL Intracatheter Once     sodium chloride (PF) 0.9%  1.3-2.6 mL Intracatheter Once    Followed by     heparin  3 mL Intracatheter Once     [Held by provider] heparin ANTICOAGULANT  5,000 Units Subcutaneous Q12H     insulin aspart  1-6 Units Subcutaneous Q6H     [Held by provider] insulin glargine  6 Units Subcutaneous QAM AC     lidocaine  2 patch Transdermal Q24h    And     lidocaine   Transdermal Q8H TONY     menthol  1 patch Topical QAM    And     menthol   Transdermal Q8H     menthol   Transdermal Daily     metoprolol  12.5 mg Per J Tube BID     multivitamins w/minerals  15 mL Per Feeding Tube Daily     nystatin  1,000,000 Units Swish & Swallow 4x Daily     pantoprazole  40 mg Per J Tube BID     prednisoLONE  10 mg Per J Tube Daily     prednisoLONE  7.5 mg Per J Tube QPM     sodium chloride (PF)  3 mL Intracatheter Q8H     tacrolimus  4 mg Per J Tube QPM     tacrolimus  4.5 mg Per J Tube QAM     **a portion of Dr alvarez previous note is copied and pasted above, it has been edited as needed to reflect events for today**

## 2022-09-27 NOTE — PROGRESS NOTES
HEMODIALYSIS TREATMENT NOTE    Date: 9/27/2022  Time: 11:13 AM    Data:  Pre Wt: 70.5 kg    Desired Wt: 70.5 kg   Post Wt: 70.5 kg  Weight change: 0 kg  Ultrafiltration - Post Run Net Total Removed (mL): 0 mL  Vascular Access Status: CVC (RTDC) / patent  Dialyzer Rinse: light  Total Blood Volume Processed: 36.04 L  Total Dialysis (Treatment) Time: 2.5 hours  Dialysate Bath: K 3, Ca 2.25  Heparin: None    Lab:   No    Interventions:  250 BFR / 500 DFR  1 unit of Novolog given for   5,000 units of Epoetin administered as ordered    Assessment:  2nd HD for patient with new ESRD. Cystatin C GFR of 10 ml/mn. A&Ox4. Calm and cooperative with cares. C/o incisional pain 3/10 on arrival to unit but had just received PRN Oxycodone prior to transfer. Fell asleep shortly after treatment initiation with no complaints for remainder of run. No fluid removed. VSS throughout. Chest tube to gravity. TF @ 40 ml/hr. Some drainage at new CVC site (CVC placed yesterday 9/26) but dressing intact. CVC saline locked post-treatment and Clear Guard caps changed.      Plan:    Next HD per renal team.

## 2022-09-27 NOTE — PLAN OF CARE
D: Admitted 9/9 for planned admission from home for work up antibody medicated rejection versus infection.  Hx of end stage COPD s/p bilateral sequential lung transplant (6/22), pyloric ulcer, recent ERCP c/f hemobilia, gastroparesis s/p GJ tube placement and Percutaneous J tube.  I: Monitored vitals and assessed pt status.   Changed:   Running: TF 40 ml/hr continuous   PRN: Oxycodone, imodium.   Tele: No tele orders.   O2: RA  Mobility: SBA.    A: A0x4. VSS. Afebrile. Complained of loose stools x 3 and lower abdominal discomfort. Turned off TF at 1500 to relieve discomfort. Up with SBA. Able to make needs known,     P: Continue to monitor Pt status and report changes to Gold 10. Dialysis 9/27 and 9/28 and then assess for continued need. Possibly remove CT tomorrow pending xray results.     Temp:  [97.5  F (36.4  C)-98.9  F (37.2  C)] 98.1  F (36.7  C)  Pulse:  [] 88  Resp:  [13-26] 23  BP: (114-142)/(67-79) 125/69  Cuff Mean (mmHg):  [] 81  SpO2:  [93 %-98 %] 95 %     Goal Outcome Evaluation:

## 2022-09-27 NOTE — PROGRESS NOTES
Nephrology Progress Note  09/27/2022     Ms Rodriguez is a 63 yo female with PMH end stage COPD s/p bilateral sequential lung transplant (6/22) on triple IS ( MMF/Tacro/pred), pyloric ulcer, recent ERCP c/f hemobilia, gastroparesis s/p GJ tube placement and Percutaneous J tube , BACILIO requiring short term HD, admitted 9/9/22 for planned admission from transplant pulmonology to work up antibody medicated rejection versus infection. She was found to have ESRD with Cystatin C GFR of 10 ml/mn.      Assessment & Recommendations:     1. ESRD based upon Cystatin C ( GFR of 10) - Creat has remained stable in the mid to upper 1 range, but BUN was elevated out of proportion to her creat.    - It was felt that patient's symptoms of fatigue, poor appetite, recent confusion and elevated BUN indicated poor renal function. Given low muscle mass Cystatin C was obtained and found to be 10 ml/mn   - Admission BUN was 82.3. She peaked at 112 on 9/20. BUN down to 63 today following her initial HD run.     - Tunneled HD line placed 9/26/22   - Patient initiating HD 9/26/22. Running today and tomorrow then will follow [facility] schedule.     - RNCC will need to begin OP HD search. Patient is staying at the [facility] for another several months, so [facility] would probably work. She will need transportation.     2. Volume status - No significant volume issue. Diarrhea is resolving and felt to be 2/2 TF. Intake 850.  ml, mixed urine/stool 200 ml, CT 10 ml. Bps 120-130/. Pre run weight 70.5 kg. Admission weight 73.4 kg   - No fluid removal on HD   - Please get standing pre run weights   - Continue strict I/O    3. CV - Pre run b/ps 120-130/. No edema. On Metoprolol 12.5 mg bid    4. Lung transplant IS: AZa, Pred, Tac. Tac level 11.6   - Per transplant    5. Electrolytes - No acute concerns. K 4.1, Na 135    6. Acid base - No acute concerns. Bicarb 39    7. BMD - Ca 9.0, Phos 3.5, albumin 3.1   - PTH 46, Vit D pending ( 9/22)   -  "Continue Ca/D    8. Anemia - Hgb 7.9   - Last RBC 8/31   - Pyloric ulcer per EGD 8/3/22   - Ferritin 769, Fe 54 IS 22 ( 9/22)   - On B12, folic acid, PPI   - Begin Epo 5000 U IV q run today    9. Steroid induced DM - On insulin   - Per primary team    Recommendations were communicated to primary team via progress note    Emili Nolasco NP   Division of Renal Disease and Hypertension  [facility]  [facility]  Vocera Web Console    Interval History :   Nursing and provider notes from last 24 hours reviewed.  Seen on HD.   Tolerating w/o problem    Review of Systems:   I reviewed the following systems:  GI: Tolerating some oral intake but mostly receiving TF through her J tube. Diarrhea resolving  Neuro:  Alert/interactive  Constitutional:  no fever or chills  CV: Overall her breathing has improved from hospital admission. CT clamped today. No edema, CP   : Non oliguric    Physical Exam:   I/O last 3 completed shifts:  In: 1080 [P.O.:240; NG/GT:480]  Out: 1150 [Urine:950; Other:200]   /74   Pulse 93   Temp 98.2  F (36.8  C) (Oral)   Resp 23   Ht 1.588 m (5' 2.5\")   Wt 70.5 kg (155 lb 6.4 oz)   SpO2 98%   BMI 27.97 kg/m       GENERAL APPEARANCE: Comfortable on HD.   EYES:  no scleral icterus, pupils equal  PULM: lungs CTA. Breathing is non labored. Off supplemental oxygen. Chest tube present  CV: tachy      -edema- none   GI: soft, NT, Non distended.   INTEGUMENT: no  rash  NEURO:  Alert/interactive  Access - Right TDC    Labs:   All labs reviewed by me  Electrolytes/Renal -   Recent Labs   Lab Test 09/27/22  0950 09/27/22  0511 09/27/22  0456 09/26/22  0913 09/26/22  0555 09/25/22  0933 09/25/22  0736 09/24/22  1026 09/24/22  0615 09/20/22  1012 09/20/22  0701   NA  --  135*  --   --  137  --   --   --  141   < > 141   POTASSIUM  --  4.1  --   --  4.9  --   --   --  4.1   < > 4.3   CHLORIDE  --  91*  --   --  87*  --   --   --  89*   < > 87*   CO2  --  39*  --   --  44*  --   --   --  44*   < > 46*   BUN  " --  63.4*  --   --  96.7*  --   --   --  99.5*   < > 112.0*   CR  --  1.48*  --   --  1.72*  --   --   --  1.59*   < > 1.78*   * 173* 179*   < > 148*   < >  --    < > 163*   < > 163*   ESTUARDO  --  9.0  --   --  9.4  --   --   --  9.5   < > 9.0   MAG  --  2.3  --   --  2.7*  --  2.7*  --  2.8*   < > 2.8*   PHOS  --  3.5  --   --  3.7  --   --   --   --   --  4.0    < > = values in this interval not displayed.       CBC -   Recent Labs   Lab Test 09/27/22  0511 09/26/22  0555 09/24/22  0615   WBC 8.4 8.5 7.1   HGB 7.9* 8.2* 7.9*    224 170       LFTs -   Recent Labs   Lab Test 09/27/22  0511 09/26/22  0555 09/24/22  0615   ALKPHOS 85 79 87   BILITOTAL 0.2 0.2 <0.2   ALT 12 11 10   AST 21 25 17   PROTTOTAL 5.2* 5.4* 5.3*   ALBUMIN 3.1* 3.1* 3.0*       Iron Panel -   Recent Labs   Lab Test 09/26/22  0555 09/03/22  1039 08/24/22  0810   IRON 54 21* 41   IRONSAT 22 9* 21   CARLOS 769* 343* 334*         Imaging:    Chest 2 views     INDICATION: Interval follow-up, chest tubes, lung transplant history     COMPARISON: 9/25/2022     FINDINGS: Heart size upper normal. Clamshell sternotomy from prior  bilateral lung transplant again evident. Left costophrenic angle  blunting unchanged. Left basilar chest catheter again noted. Right IJ  approach venous catheter noted with tip near the cavoatrial junction.  No pneumothorax. Scattered right lung subsegmental atelectasis.                                                                      IMPRESSION: Bilateral lung transplantation. Continued left pleural  effusion and associated atelectasis. Scattered right lung subsegmental  atelectasis.     ALVINA HARRY MD     Current Medications:    azaTHIOprine  100 mg Per J Tube Daily     banatrol plus  1 packet Per Feeding Tube BID     calcium carbonate 600 mg-vitamin D 400 units  1 tablet Per J Tube BID w/meals     cyanocobalamin  500 mcg Per Feeding Tube Daily     dapsone  50 mg Per J Tube Q Mon Wed Fri AM     folic acid  1  mg Oral or Feeding Tube Daily     gabapentin  200 mg Oral At Bedtime     sodium chloride (PF) 0.9%  1.3-2.6 mL Intracatheter Once    Followed by     heparin  3 mL Intracatheter Once     sodium chloride (PF) 0.9%  1.3-2.6 mL Intracatheter Once    Followed by     heparin  3 mL Intracatheter Once     [Held by provider] heparin ANTICOAGULANT  5,000 Units Subcutaneous Q12H     insulin aspart  1-6 Units Subcutaneous Q6H     [Held by provider] insulin glargine  6 Units Subcutaneous QAM AC     lidocaine  2 patch Transdermal Q24h    And     lidocaine   Transdermal Q8H TONY     menthol  1 patch Topical QAM    And     menthol   Transdermal Q8H     menthol   Transdermal Daily     metoprolol  12.5 mg Per J Tube BID     multivitamins w/minerals  15 mL Per Feeding Tube Daily     nystatin  1,000,000 Units Swish & Swallow 4x Daily     pantoprazole  40 mg Per J Tube BID     prednisoLONE  10 mg Per J Tube Daily     prednisoLONE  7.5 mg Per J Tube QPM     sodium chloride (PF)  3 mL Intracatheter Q8H     tacrolimus  4 mg Per J Tube QPM     tacrolimus  4.5 mg Per J Tube QAM     valGANciclovir  450 mg Oral or Feeding Tube Once per day on Mon Wed Fri       dextrose       - MEDICATION INSTRUCTIONS -       - MEDICATION INSTRUCTIONS -       Emili Nolasco, NP

## 2022-09-27 NOTE — PROGRESS NOTES
Pulmonary Medicine  Cystic Fibrosis - Lung Transplant Team  Progress Note  2022       Patient: Sofie Rodriguez  MRN: [id number]  : 1960 (age 62 year old)  Transplant: 2022 (Lung), POD#91  Admission date: 2022    Assessment & Plan:     Sofie Rodriguez is a 62-year-old female s/p BSLT (22) for COPD complicated by persistent bilateral pleural effusions, persistent CO2 retention, right hemidiaphragm palsy, BACILIO (dialysis -), C. diff colitis, gastroparesis s/p GJ tube placement (22), GI bleed 2/2 pyloric ulcer, hemobilia s/p ERCP and MRCP (), and persistent vasoplegia.  Other history notable for HFpEF, NTM colonoization, hepatitis C, and methamphetamine use.  Patient admitted 22 with persistent dyspnea (increased with activity), daily morning hemoptysis, and increased BLE edema.  Also noted to have persistent, increased bilateral pleural effusions, diffuse bilateral groundglass changes, and more focal appearing LLL infiltrates on imaging.  Treating with aggressive diuresis with some improvement in symptoms and hypoxia.  Hemoptysis resolved, mild intermittent hypoxia and ANAYA persists.  S/p right pigtail chest tube and aspiration of left pleural effusion () with subsequent pigtail chest tube (9/15) with lytic therapy.  Right chest tube removed .  Worsening mentation and tremors noted .  Also, with worsening hypercapnia, treated with NIPPV for several days.  AMS resolved, likely d/t ABX for UTI as elevated BUN has not changed, additional workup unremarkable.  Left chest tube clamped .     Today's recommendations:  - Sputum culture ordered if able to collect  - VBG ordered   - CXR daily while left chest tube remains (clamped ), awaiting CXR today and if stable recommend chest CT without contrast for further evaluation (given prior noted air leak)  - Tacrolimus level ordered   - Prednisone taper due 10/13 (not yet ordered)  - VGCV for CMV ppx  completed 9/26, monitor CMV every other week (ordered 10/5)  - DSA and EBV ordered 10/5  - IgG ordered 9/29  - Repeat gastric emptying study to evaluate for ability to transition medications to PO and progress diet, likely later this week  - Repeat EGD 10/13 to check healing of ulcer, sooner if hgb declines further     S/p bilateral sequential lung transplant (BSLT) for end stage COPD:  Acute hypoxia with chronic hypercapnia:   Pulmonary edema:  Persistent bibasilar pleural effusions:   Right hemidiaphragm palsy: Admitted with increased dyspnea with minimal exertion and small volume daily hemoptysis.  Also with marked decline in PFTs (FEV1 1.22L, 50% on 8/18 to 0.98L, 40% on 9/7).  Chest CT (9/8) with increased effusions and groundglass changes.  DSA positive but stable (as below).  BNP markedly elevated (30k) and also with increased BLE edema.  Etiology unclear and is likely multi-factorial with DDx including pulmonary edema from hypervolemia/progressive HFpEF, rejection (ACR +/- AMR), alveolar hemorrhage, and/or infection.  Aggressively diuresed with some improvement in symptoms.  Bronch (9/13) unremarkable, BAL of lingula sent, cultures no growth (GPC on gram stain only).  S/p right pigtail chest tube placement (9/13-9/22), transudative with moderate output initially but quickly decreasing, cultures negative.  S/p aspiration of left pleural effusion (9/13) and then pigtail chest tube (9/15) s/p full lytic course (916-9/19), cultures also negative, clamped 9/26.  Chest CT (9/21) with decreased pleural effusions with continued prominent loculated component of right effusion and overall slight decrease in associated atelectasis.  Still with some ANAYA but no further hemoptysis since ~9/12.  Placed on continuous BiPAP 9/19-9/22 due to worsening hypercapnia and mentation, since improved.  Unable to collect sputum sample, respiratory ABX coverage deferred.  Weaned to RA at rest on 9/24.  - Sputum culture still  needs collection, defer pulmonary ABX at this time  - Supplemental O2 to keep >92%, IS and Aerob[place] q1h w/a and encourage OOB during the day as much as able  - Continue to defer NIPPV, repeat VBG prn with change in mentation/clinical status and plan to repeat VBG 9/29 (ordered) to monitor after dialysis initiation  - Left chest tube clamped (9/26), air leak noted 9/23  - CXR daily while left chest tube remains, awaiting CXR today  - If CXR stable, recommend chest CT without contrast for further evaluation (given prior noted air leak)     Immunosuppression:  - Tacrolimus 4.5 mg qAM / 4 mg qPM (increased 9/19).  Goal level 8-12.  Repeat level 9/29 (ordered).  - Azathioprine 100 mg daily (9/20, MMF stopped due to ongoing diarrhea)  - Prednisone 10 mg qAM / 7.5 mg qPM with next taper due 10/13 (not yet ordered), defer accelerated taper (with elevated BUN) at this time  Date AM dose (mg) PM dose (mg)   9/15/22 10 7.5   10/13/22 7.5 7.5   11/10/22 7.5 5   12/8/22 5 5   1/5/23 5 2.5      Prophylaxis:   - Dapsone qMWF for PJP ppx (resumed 9/19, monitor for worsening anemia; Bactrim stopped d/t hyperkalemia, will need to monitor for recurrence of anemia with dapsone; Pentamidine neb not tolerated on 9/7 after only 5 minutes d/t excessive coughing)  - VGCV for CMV ppx completed 9/26 (through POD#90), D+/R+, CMV (9/7) negative, monitor CMV every other week (10/5, ordered)     Positive DSA: Newly positive on 8/10 with DQB2 mfi 2155.   - Monitoring [id number], next due 10/5 (ordered), see below for trend:  Date DQB2 Notes   8/10 2155     9/1 7033 Current peak   9/21 5390 Most recent       EBV viremia: Low level (1374 on 9/7), not likely to be clinically significant.  - Follow EBV monthly (10/5, ordered)     Hypogammaglobulinemia: IgG adequate at time of transplant, repeat level low (336) on 7/28.  S/p IVIG 7/30, tolerated well.  IgG on 8/29 adequate at 672.   - Follow IgG monthly (9/29, ordered)     Other relevant problems being  managed by the primary team:     Encephalopathy:  Tremors:   Presumed UTI: Noted 9/19 with confusion as well as tremor.  DDx including hypercapnia, hyperammonia, infection, uremia (see below), medication related.  VBG with worsening hypercapnia (99).  BUN elevated (112).  Ammonia normal (24).  CRP normal, procal 0.1.  UA with moderate blood, large leukocyte esterase, 3 RBC, 29 WBC, and many bacteria.  Urine culture (9/19) with mixture of urogenital josue.  Tacrolimus levels recently were subtherapeutic.  Head CT (9/20) without acute intracranial pathology.  Blood culture (9/19) NGTD.  AMS resolved ~9/22.  S/p ceftriaxone 5-day course (9/20-9/24).     ESRD based upon Cystatin C (GFR of 10): Nephrology consulted 9/21, see their note for details, with concern for CKD5 as Cr may suggest overestimation of kidney function with limited muscle mass, unclear if trend over the past week reflects BACILIO.  Cystatin C 4.3 with GFR 10.  Making urine.  Renal US (9/22) normal.  HD trial discussed with pt. and daughter on 9/22, tunneled line placed and HD initiated 9/26.  - Management per nephrology     Diarrhea: C diff negative on 9/10 and 9/20.  Likely medication related (MMF), but unable to transition to Myfortic given NPO.  Loperamide utilized with some benefit.  Goal to titrate to antidiarrheal 3 stools daily, management per primary.  Enteric stool panel negative 9/16.  Transitioned from MMF to AZA as above.      Severe gastroparesis:   Pyloric ulcer: Gastric emptying study 7/20 with severe gastric emptying delay (95% retention at 4 hours), s/p GJ tube placement in IR 7/27.  S/p EGD (8/3) noted to have pyloric ulcer and excessive gastric fluid and residual food.  S/p EGD (8/11) with mild erythema 2/2 GJ tube trauma seen near insertion site but no active bleeding.  - PPI BID  - TF continuous and ALL enteral medications via J tube  - G tube to gravity drainage; full liquid diet for comfort only  - Repeat gastric emptying study to  "evaluate for ability to transition medications to PO and progress diet, likely later this week  - Repeat EGD 10/13 to check healing of ulcer, sooner if hgb declines further    We appreciate the excellent care provided by the [place] [id number] team.  Recommendations communicated via in person rounding and this note.  Will continue to follow along closely, please do not hesitate to call with any questions or concerns.    Patient discussed with Dr. Gong.    Yuliet Aviles PA-C  [facility]patient [facility]  Pulmonary CF/Transplant     Subjective & Interval History:     On RA during visit this morning, occasionally using 1L NC.  Able to ambulate around room without oxygen.  Reports dyspnea likely more related to deconditioning.  Cough occasionally productive although unable to expectorate sputum.  Left chest tube clamped yesterday evening.  Endorses pain at left chest tube site an tunneled HD line site.  Reports episodes of diarrhea when taking muscle relaxant.  Otherwise stools slightly more formed.  Tired after initial HD run yesterday.    Review of Systems:     C: No fever, no chills, no change in weight  INTEGUMENTARY/SKIN: No rash or obvious new lesions  ENT/MOUTH: No sore throat, no sinus pain, no nasal congestion or drainage  RESP: See interval history  CV: No chest pain, + peripheral edema  GI: No nausea, no vomiting, no reflux symptoms  : No dysuria  MUSCULOSKELETAL: See interval history  ENDOCRINE: Blood sugars with adequate control  NEURO: No headache, no numbness or tingling  PSYCHIATRIC: Mood stable    Physical Exam:     All notes, images, and labs from past 24 hours (at minimum) were reviewed.    Vital signs:  Temp: 97.7  F (36.5  C) Temp src: Oral BP: 114/71 Pulse: 96   Resp: 18 SpO2: 96 % O2 Device: None (Room air) Oxygen Delivery: 1 LPM Height: 158.8 cm (5' 2.5\") Weight: 70.5 kg (155 lb 6.4 oz)  I/O:     Intake/Output Summary (Last 24 hours) at 9/27/2022 1023  Last data filed at 9/27/2022 0000  Gross per 24 hour "   Intake 885 ml   Output 1050 ml   Net -165 ml     Constitutional: Sitting up in bed, in no apparent distress.   HEENT: Eyes with pink conjunctivae, anicteric.  Oral mucosa moist without lesions.    PULM: Dimiinished air flow to bases bilaterally.  Occasional crackle.  No rhonchi, no wheezes.  Non-labored breathing on RA.  Left chest tube clamped.  CV: Normal S1 and S2.  RRR.  + systolic murmur.  No gallop or rub.  1+ RLE and 2+ LLE edema.   ABD: NABS, soft, nontender, nondistended.  PEG/J tube site not visualized.  MSK: Moves all extremities.    NEURO: Alert, conversant.   SKIN: Warm, dry.  No rash on limited exam.   PSYCH: Mood stable.     Lines, Drains, and Devices:  Peripheral IV 09/10/22 Anterior;Left Lower forearm (Active)   Site Assessment WDL 09/27/22 0000   Line Status Saline locked 09/27/22 0000   Dressing Intervention New dressing  09/10/22 0135   Phlebitis Scale 0-->no symptoms 09/27/22 0000   Infiltration Scale 0 09/27/22 0000   Number of days: 17       CVC Double Lumen Right Internal jugular Tunneled (Active)   Site Assessment [facility] 09/27/22 0925   Dressing Type Chlorhexidine disk;Transparent 09/27/22 0925   Dressing Status clean;dry;intact 09/27/22 0925   Dressing Change Due 10/03/22 09/27/22 0925   Line Necessity yes, meets criteria 09/27/22 0925   Blue - Status infusing 09/27/22 0930   Blue - Cap Change Due 09/27/22 09/27/22 0925   Red - Status infusing 09/27/22 0930   Red - Cap Change Due 09/27/22 09/27/22 0925   Number of days: 1     Data:     LABS    CMP:   Recent Labs   Lab 09/27/22  0950 09/27/22  0511 09/27/22  0456 09/26/22 2027 09/26/22  0913 09/26/22  0555 09/25/22  0933 09/25/22  0736 09/24/22  1026 09/24/22  0615 09/23/22  0943 09/23/22  0536   NA  --  135*  --   --   --  137  --   --   --  141  --  140   POTASSIUM  --  4.1  --   --   --  4.9  --   --   --  4.1  --  5.0   CHLORIDE  --  91*  --   --   --  87*  --   --   --  89*  --  87*   CO2  --  39*  --   --   --  44*  --   --   --  44*   --  50*   ANIONGAP  --  5*  --   --   --  6*  --   --   --  8  --  3*   * 173* 179* 176*   < > 148*   < >  --    < > 163*   < > 179*   BUN  --  63.4*  --   --   --  96.7*  --   --   --  99.5*  --  102.0*   CR  --  1.48*  --   --   --  1.72*  --   --   --  1.59*  --  1.63*   GFRESTIMATED  --  40*  --   --   --  33*  --   --   --  37*  --  36*   ESTUARDO  --  9.0  --   --   --  9.4  --   --   --  9.5  --  9.8   MAG  --  2.3  --   --   --  2.7*  --  2.7*  --  2.8*  --  2.8*   PHOS  --  3.5  --   --   --  3.7  --   --   --   --   --   --    PROTTOTAL  --  5.2*  --   --   --  5.4*  --   --   --  5.3*  --  5.6*   ALBUMIN  --  3.1*  --   --   --  3.1*  --   --   --  3.0*  --  3.3*   BILITOTAL  --  0.2  --   --   --  0.2  --   --   --  <0.2  --  0.2   ALKPHOS  --  85  --   --   --  79  --   --   --  87  --  89   AST  --  21  --   --   --  25  --   --   --  17  --  25   ALT  --  12  --   --   --  11  --   --   --  10  --  12    < > = values in this interval not displayed.     CBC:   Recent Labs   Lab 09/27/22  0511 09/26/22  0555 09/24/22  0615 09/23/22  0536   WBC 8.4 8.5 7.1 6.4   RBC 2.51* 2.65* 2.55* 2.55*   HGB 7.9* 8.2* 7.9* 8.0*   HCT 26.3* 27.4* 26.9* 26.8*   * 103* 106* 105*   MCH 31.5 30.9 31.0 31.4   MCHC 30.0* 29.9* 29.4* 29.9*   RDW 19.9* 19.9* 19.6* 19.3*    224 170 207       INR: No lab results found in last 7 days.    Glucose:   Recent Labs   Lab 09/27/22  0950 09/27/22  0511 09/27/22  0456 09/26/22 2027 09/26/22  1555 09/26/22  1139   * 173* 179* 176* 182* 151*       Blood Gas:   Recent Labs   Lab 09/22/22  0551 09/21/22  1648 09/21/22  1002   PHV 7.42 7.41 7.46*   PCO2V 82* 82* 72*   PO2V 26 24* 16*   HCO3V 53* 52* 51*   LINDY 23.8* 24.2* -1.7   O2PER 30 1 21       Culture Data No results for input(s): CULT in the last 168 hours.    Virology Data:   Lab Results   Component Value Date    FLUAH1 Not Detected 09/13/2022    FLUAH3 Not Detected 09/13/2022    BN9814 Not Detected 09/13/2022     IFLUB Not Detected 09/13/2022    RSVA Not Detected 09/13/2022    RSVB Not Detected 09/13/2022    PIV1 Not Detected 09/13/2022    PIV2 Not Detected 09/13/2022    PIV3 Not Detected 09/13/2022    HMPV Not Detected 09/13/2022       Historical CMV results (last 3 of prior testing):  Lab Results   Component Value Date    CMVQNT Not Detected 09/13/2022    CMVQNT Not Detected 09/07/2022    CMVQNT Not Detected 09/01/2022     No results found for: CMVLOG    Urine Studies    Recent Labs   Lab Test 09/19/22  2254 08/01/22  0319 07/08/22  0831   URINEPH 7.0 8.0* 5.5   NITRITE Negative Negative Negative   LEUKEST Large* Negative Negative   WBCU 29*  --  1       Most Recent [place] Pulmonary Function Testing (FVC/FEV1 only)  FVC-Pre   Date Value Ref Range Status   09/07/2022 1.01 L    09/01/2022 1.07 L    08/24/2022 1.23 L    08/18/2022 1.33 L      FVC-%Pred-Pre   Date Value Ref Range Status   09/07/2022 33 %    09/01/2022 35 %    08/24/2022 40 %    08/18/2022 43 %      FEV1-Pre   Date Value Ref Range Status   09/07/2022 0.98 L    09/01/2022 1.02 L    08/24/2022 1.17 L    08/18/2022 1.22 L      FEV1-%Pred-Pre   Date Value Ref Range Status   09/07/2022 40 %    09/01/2022 42 %    08/24/2022 48 %    08/18/2022 50 %        IMAGING    Recent Results (from the past 48 hour(s))   IR CVC Tunnel Placement > 5 Yrs of Age    Narrative    PRE-PROCEDURE DIAGNOSIS:  End-stage renal disease    POST-PROCEDURE DIAGNOSIS:  Same    PROCEDURE: Tunneled central venous catheter placement    Impression    IMPRESSION:  Completed image-guided placement of 14.5 [place], 23 cm  double lumen tunneled central venous catheter via right internal  jugular vein. Catheter tip in high right atrium. Aspirates and flushes  freely, heparin locked and ready for immediate use.  No complication.     ----------    CLINICAL HISTORY:  Patient requires central venous access for dialysis  therapy.  Tunneled central venous catheter placement requested.    PERFORMED BY:  Jannet  FLOYD Gonzalez    CONSENT:  The patient understood the limitations, alternatives, and  risks of the procedure and agreed to the procedure.  Written informed  consent was obtained and is documented in the patient record.    SEDATION CONSENT:  It was explained to the patient that medications  used for sedation and pain relief may be administered, if needed, to  reduce anxiety and discomfort that may be associated with the  procedure.  Serious side effects from the medications are rare but may  include prolonged drowsiness and difficulty breathing, possibly  requiring placement of a breathing tube to ventilate the lungs.    MEDICATIONS:  Intravenous sedation was administered with 1 mg  midazolam and 50 mcg fentanyl.  A 10:1 volume mixture of 1% lidocaine  without epinephrine buffered with 8.4% bicarbonate solution was  available for local anesthesia.  The catheter was heparin locked upon  completion of placement.    NURSING:  The patient was placed on continuous vital signs monitoring.   Vital signs and sedation were monitored by nursing staff under IR  physician staff supervision.      SEDATION TIME:  20 minutes face-to-face    FLUOROSCOPY TIME: minutes    DESCRIPTION:  The right neck and upper chest were prepped and draped  in the usual sterile fashion.      Under ultrasound guidance, the right internal jugular vein was  identified and the overlying skin was anesthetized and skin  dermatotomy was made.  Under ultrasound guidance, right internal  jugular venipuncture was made with needle.  Image saved documenting  venipuncture and patency.    Needle was exchanged over guidewire for a dilator under fluoroscopic  guidance.  Length to right atrium was measured with guidewire.   Guidewire and inner dilator were removed.  Wire was advanced into  inferior vena cava under fluoroscopic guidance and secured.     The anterior chest skin was anesthetized and incision was made after  measurements were taken.  A cuffed catheter was  subcutaneously  tunneled from the anterior chest incision to the internal jugular  venipuncture site after path of tunnel was anesthetized.  The dilator  was exchanged over guidewire for a peel-away sheath.  Guidewire was  removed.  Under fluoroscopic guidance, the catheter was placed through  the peel-away sheath.  Peel-away sheath was removed.      Final catheter position saved.  Both catheter lumens adequately  aspirated and flushed.  Each lumen was heparin locked.  A catheter  retaining suture and sterile dressing were applied.  The skin  dermatotomy site overlying the internal jugular venipuncture was  closed with topical adhesive.    COMPLICATIONS:  No immediate concerns; the patient remained stable  throughout the procedure and tolerated it well.    ESTIMATED BLOOD LOSS:  Minimal    SPECIMENS:  None    DANIEL HERNANDEZ PA-C         SYSTEM ID:  [id number]   XR Chest 2 Views    Narrative    Chest 2 views    INDICATION: Interval follow-up, chest tubes, lung transplant history    COMPARISON: 9/25/2022    FINDINGS: Heart size upper normal. Clamshell sternotomy from prior  bilateral lung transplant again evident. Left costophrenic angle  blunting unchanged. Left basilar chest catheter again noted. Right IJ  approach venous catheter noted with tip near the cavoatrial junction.  No pneumothorax. Scattered right lung subsegmental atelectasis.      Impression    IMPRESSION: Bilateral lung transplantation. Continued left pleural  effusion and associated atelectasis. Scattered right lung subsegmental  atelectasis.    ALVINA HARRY MD         SYSTEM ID:  [id number]

## 2022-09-28 ENCOUNTER — APPOINTMENT (OUTPATIENT)
Dept: OCCUPATIONAL THERAPY | Facility: CLINIC | Age: 62
DRG: 205 | End: 2022-09-28
Attending: INTERNAL MEDICINE
Payer: MEDICARE

## 2022-09-28 LAB
ALBUMIN SERPL BCG-MCNC: 2.9 G/DL (ref 3.5–5.2)
ALP SERPL-CCNC: 86 U/L (ref 35–104)
ALT SERPL W P-5'-P-CCNC: 13 U/L (ref 10–35)
ANION GAP SERPL CALCULATED.3IONS-SCNC: 6 MMOL/L (ref 7–15)
AST SERPL W P-5'-P-CCNC: 20 U/L (ref 10–35)
BASOPHILS # BLD MANUAL: 0 10E3/UL (ref 0–0.2)
BASOPHILS NFR BLD MANUAL: 0 %
BILIRUB SERPL-MCNC: 0.2 MG/DL
BUN SERPL-MCNC: 39.2 MG/DL (ref 8–23)
CALCIUM SERPL-MCNC: 8.9 MG/DL (ref 8.8–10.2)
CHLORIDE SERPL-SCNC: 95 MMOL/L (ref 98–107)
CREAT SERPL-MCNC: 1.44 MG/DL (ref 0.51–0.95)
DEPRECATED CALCIDIOL+CALCIFEROL SERPL-MC: 25 UG/L (ref 20–75)
DEPRECATED HCO3 PLAS-SCNC: 32 MMOL/L (ref 22–29)
EOSINOPHIL # BLD MANUAL: 0.1 10E3/UL (ref 0–0.7)
EOSINOPHIL NFR BLD MANUAL: 1 %
ERYTHROCYTE [DISTWIDTH] IN BLOOD BY AUTOMATED COUNT: 19.8 % (ref 10–15)
GFR SERPL CREATININE-BSD FRML MDRD: 41 ML/MIN/1.73M2
GLUCOSE BLDC GLUCOMTR-MCNC: 133 MG/DL (ref 70–99)
GLUCOSE BLDC GLUCOMTR-MCNC: 137 MG/DL (ref 70–99)
GLUCOSE BLDC GLUCOMTR-MCNC: 172 MG/DL (ref 70–99)
GLUCOSE BLDC GLUCOMTR-MCNC: 178 MG/DL (ref 70–99)
GLUCOSE BLDC GLUCOMTR-MCNC: 190 MG/DL (ref 70–99)
GLUCOSE SERPL-MCNC: 143 MG/DL (ref 70–99)
HCT VFR BLD AUTO: 27 % (ref 35–47)
HGB BLD-MCNC: 8.4 G/DL (ref 11.7–15.7)
LYMPHOCYTES # BLD MANUAL: 0.3 10E3/UL (ref 0.8–5.3)
LYMPHOCYTES NFR BLD MANUAL: 3 %
MAGNESIUM SERPL-MCNC: 2 MG/DL (ref 1.7–2.3)
MCH RBC QN AUTO: 31.7 PG (ref 26.5–33)
MCHC RBC AUTO-ENTMCNC: 31.1 G/DL (ref 31.5–36.5)
MCV RBC AUTO: 102 FL (ref 78–100)
METAMYELOCYTES # BLD MANUAL: 0.3 10E3/UL
METAMYELOCYTES NFR BLD MANUAL: 3 %
MONOCYTES # BLD MANUAL: 0.4 10E3/UL (ref 0–1.3)
MONOCYTES NFR BLD MANUAL: 4 %
MYELOCYTES # BLD MANUAL: 0.2 10E3/UL
MYELOCYTES NFR BLD MANUAL: 2 %
NEUTROPHILS # BLD MANUAL: 9.1 10E3/UL (ref 1.6–8.3)
NEUTROPHILS NFR BLD MANUAL: 87 %
PHOSPHATE SERPL-MCNC: 3.2 MG/DL (ref 2.5–4.5)
PLAT MORPH BLD: ABNORMAL
PLATELET # BLD AUTO: 231 10E3/UL (ref 150–450)
POTASSIUM SERPL-SCNC: 4.2 MMOL/L (ref 3.4–5.3)
PROT SERPL-MCNC: 5.1 G/DL (ref 6.4–8.3)
RBC # BLD AUTO: 2.65 10E6/UL (ref 3.8–5.2)
RBC MORPH BLD: ABNORMAL
SODIUM SERPL-SCNC: 133 MMOL/L (ref 136–145)
WBC # BLD AUTO: 10.5 10E3/UL (ref 4–11)

## 2022-09-28 PROCEDURE — 90937 HEMODIALYSIS REPEATED EVAL: CPT

## 2022-09-28 PROCEDURE — 250N000009 HC RX 250: Performed by: NURSE PRACTITIONER

## 2022-09-28 PROCEDURE — 214N000001 HC R&B CCU UMMC

## 2022-09-28 PROCEDURE — 99233 SBSQ HOSP IP/OBS HIGH 50: CPT | Mod: 24

## 2022-09-28 PROCEDURE — 250N000013 HC RX MED GY IP 250 OP 250 PS 637: Performed by: INTERNAL MEDICINE

## 2022-09-28 PROCEDURE — 97140 MANUAL THERAPY 1/> REGIONS: CPT | Mod: GO

## 2022-09-28 PROCEDURE — 86481 TB AG RESPONSE T-CELL SUSP: CPT | Performed by: PEDIATRICS

## 2022-09-28 PROCEDURE — 85027 COMPLETE CBC AUTOMATED: CPT | Performed by: INTERNAL MEDICINE

## 2022-09-28 PROCEDURE — 84100 ASSAY OF PHOSPHORUS: CPT | Performed by: INTERNAL MEDICINE

## 2022-09-28 PROCEDURE — 250N000009 HC RX 250: Performed by: INTERNAL MEDICINE

## 2022-09-28 PROCEDURE — 258N000003 HC RX IP 258 OP 636

## 2022-09-28 PROCEDURE — 250N000012 HC RX MED GY IP 250 OP 636 PS 637: Performed by: NURSE PRACTITIONER

## 2022-09-28 PROCEDURE — 80053 COMPREHEN METABOLIC PANEL: CPT

## 2022-09-28 PROCEDURE — 36415 COLL VENOUS BLD VENIPUNCTURE: CPT

## 2022-09-28 PROCEDURE — 85007 BL SMEAR W/DIFF WBC COUNT: CPT | Performed by: INTERNAL MEDICINE

## 2022-09-28 PROCEDURE — 99232 SBSQ HOSP IP/OBS MODERATE 35: CPT | Performed by: PEDIATRICS

## 2022-09-28 PROCEDURE — 250N000011 HC RX IP 250 OP 636: Performed by: PEDIATRICS

## 2022-09-28 PROCEDURE — 83735 ASSAY OF MAGNESIUM: CPT | Performed by: INTERNAL MEDICINE

## 2022-09-28 PROCEDURE — 250N000013 HC RX MED GY IP 250 OP 250 PS 637: Performed by: NURSE PRACTITIONER

## 2022-09-28 PROCEDURE — 36415 COLL VENOUS BLD VENIPUNCTURE: CPT | Performed by: PEDIATRICS

## 2022-09-28 PROCEDURE — 99233 SBSQ HOSP IP/OBS HIGH 50: CPT | Performed by: PHYSICIAN ASSISTANT

## 2022-09-28 PROCEDURE — 250N000013 HC RX MED GY IP 250 OP 250 PS 637

## 2022-09-28 PROCEDURE — 250N000011 HC RX IP 250 OP 636

## 2022-09-28 PROCEDURE — 250N000013 HC RX MED GY IP 250 OP 250 PS 637: Performed by: STUDENT IN AN ORGANIZED HEALTH CARE EDUCATION/TRAINING PROGRAM

## 2022-09-28 PROCEDURE — 250N000011 HC RX IP 250 OP 636: Performed by: STUDENT IN AN ORGANIZED HEALTH CARE EDUCATION/TRAINING PROGRAM

## 2022-09-28 RX ORDER — ONDANSETRON 2 MG/ML
4 INJECTION INTRAMUSCULAR; INTRAVENOUS ONCE
Status: COMPLETED | OUTPATIENT
Start: 2022-09-28 | End: 2022-09-28

## 2022-09-28 RX ORDER — LIDOCAINE 40 MG/G
CREAM TOPICAL
Status: CANCELLED | OUTPATIENT
Start: 2022-09-28

## 2022-09-28 RX ADMIN — DAPSONE 50 MG: 100 TABLET ORAL at 08:12

## 2022-09-28 RX ADMIN — LOPERAMIDE HYDROCHLORIDE 2 MG: 2 CAPSULE ORAL at 03:47

## 2022-09-28 RX ADMIN — TACROLIMUS 4.5 MG: 5 CAPSULE ORAL at 08:13

## 2022-09-28 RX ADMIN — CALCIUM CARBONATE 600 MG (1,500 MG)-VITAMIN D3 400 UNIT TABLET 1 TABLET: at 18:28

## 2022-09-28 RX ADMIN — OXYCODONE HYDROCHLORIDE 5 MG: 5 SOLUTION ORAL at 08:19

## 2022-09-28 RX ADMIN — OXYCODONE HYDROCHLORIDE 5 MG: 5 SOLUTION ORAL at 19:26

## 2022-09-28 RX ADMIN — ONDANSETRON 4 MG: 2 INJECTION INTRAMUSCULAR; INTRAVENOUS at 13:13

## 2022-09-28 RX ADMIN — PREDNISOLONE 7.5 MG: 15 SOLUTION ORAL at 19:26

## 2022-09-28 RX ADMIN — INSULIN ASPART 2 UNITS: 100 INJECTION, SOLUTION INTRAVENOUS; SUBCUTANEOUS at 10:16

## 2022-09-28 RX ADMIN — GABAPENTIN 200 MG: 250 SOLUTION ORAL at 22:34

## 2022-09-28 RX ADMIN — Medication 40 MG: at 08:13

## 2022-09-28 RX ADMIN — HEPARIN SODIUM 5000 UNITS: 5000 INJECTION, SOLUTION INTRAVENOUS; SUBCUTANEOUS at 08:14

## 2022-09-28 RX ADMIN — ONDANSETRON 4 MG: 2 INJECTION INTRAMUSCULAR; INTRAVENOUS at 16:07

## 2022-09-28 RX ADMIN — TACROLIMUS 4 MG: 5 CAPSULE ORAL at 18:28

## 2022-09-28 RX ADMIN — MULTIVITAMIN 15 ML: LIQUID ORAL at 12:48

## 2022-09-28 RX ADMIN — CALCIUM CARBONATE 600 MG (1,500 MG)-VITAMIN D3 400 UNIT TABLET 1 TABLET: at 12:49

## 2022-09-28 RX ADMIN — Medication 100 MG: at 08:12

## 2022-09-28 RX ADMIN — FOLIC ACID 1 MG: 1 TABLET ORAL at 12:49

## 2022-09-28 RX ADMIN — SODIUM CHLORIDE 250 ML: 9 INJECTION, SOLUTION INTRAVENOUS at 08:50

## 2022-09-28 RX ADMIN — Medication 40 MG: at 19:26

## 2022-09-28 RX ADMIN — Medication: at 08:50

## 2022-09-28 RX ADMIN — NYSTATIN 1000000 UNITS: 100000 SUSPENSION ORAL at 12:49

## 2022-09-28 RX ADMIN — ORAL VEHICLES - SUSP 12.5 MG: SUSPENSION at 08:13

## 2022-09-28 RX ADMIN — PREDNISOLONE 10 MG: 15 SOLUTION ORAL at 08:09

## 2022-09-28 RX ADMIN — Medication 1 PACKET: at 12:48

## 2022-09-28 RX ADMIN — SODIUM CHLORIDE 300 ML: 9 INJECTION, SOLUTION INTRAVENOUS at 08:50

## 2022-09-28 RX ADMIN — CYANOCOBALAMIN TAB 500 MCG 500 MCG: 500 TAB at 12:49

## 2022-09-28 RX ADMIN — INSULIN ASPART 1 UNITS: 100 INJECTION, SOLUTION INTRAVENOUS; SUBCUTANEOUS at 03:57

## 2022-09-28 RX ADMIN — HEPARIN SODIUM 5000 UNITS: 5000 INJECTION, SOLUTION INTRAVENOUS; SUBCUTANEOUS at 19:27

## 2022-09-28 RX ADMIN — ORAL VEHICLES - SUSP 12.5 MG: SUSPENSION at 19:27

## 2022-09-28 RX ADMIN — NYSTATIN 1000000 UNITS: 100000 SUSPENSION ORAL at 08:14

## 2022-09-28 RX ADMIN — INSULIN ASPART 1 UNITS: 100 INJECTION, SOLUTION INTRAVENOUS; SUBCUTANEOUS at 22:22

## 2022-09-28 ASSESSMENT — ACTIVITIES OF DAILY LIVING (ADL)
ADLS_ACUITY_SCORE: 29
ADLS_ACUITY_SCORE: 34
ADLS_ACUITY_SCORE: 29
ADLS_ACUITY_SCORE: 34
ADLS_ACUITY_SCORE: 29
ADLS_ACUITY_SCORE: 34

## 2022-09-28 NOTE — PROGRESS NOTES
[facility]    Medicine Progress Note - Hospitalist Service, GOLD TEAM 10    Date of Admission:  9/9/2022    Assessment & Plan        62 year old female with pertinent hx of end stage COPD s/p bilateral sequential lung transplant (6/22) on triple IS ( MMF/Tacro/pred), pyloric ulcer, recent ERCP c/f hemobilia, gastroparesis s/p GJ tube placement and Percutaneous J tube presents for a planned admission from transplant pulmonology to work up antibody medicated rejection versus infection.     Today's updates  -- gastric emptying study 9-20-22, NPO at midnight before  -- IHD x3 completed, per Renal planning for MWF HD sessions, next due Friday  -- will d/w renal CTA chest with renal, timing be best suited for HD day given contrast load of CTA  -- VBG IGG and tacro level tomorrow  -- chest tube per pulm expert recs    #Acute kidney injury AKIN stage II on CKD stage IIIb, all are present on admission  :: Patient has been variable GFRs 30-50s in the last few months. Most recent Cr trend 1.5-1.9 1.56 9/8/22 cystatin C obtained and GFR ~10; discussed with Renal team; ultimately decision to give trial of iHD and monitor for renal recovery  :: trend BMP strict input and output and daily body weight  :: Renal consulted, cont iHD for now; uneventful placement of tunneled catheter by IR on 9/26. first dialysis on 9/26.  Plan for dialysis on 9/27 and 9/28 then to assess for the need for further dialysis afterwards.   :: Vitamin D level -25;  parathyroid hormone = 46; c/w Calcium and vit D      #Diarrhea likely secondary to tube feeding, not present on admission, improved  :: repeatedly ruled out for C. difficile colitis.   :: c/w fibers at 28 g and Imodium as needed.     #Acute hypoxic hypercarbic respiratory failure secondary to bibasilar pleural effusion currently with chest tubes bilaterally on top of end-stage COPD s/p bilateral sequential lung transplant on 6/22/2022 complicated by  chronic respiratory acidosis with compensation by metabolic alkalosis, all are present on admission   This is the main problem that led to this admission.  The patient has bilateral chest tubes.  The patient had her right-sided chest tube removed on 9/22/2022.  -Left-sided chest tube is clamped with chest x-ray to be obtained on 9/27 for consideration of removal of left-sided chest tube  -DSA testing on 10/5/2022  -IgG level ordered for 9/29  -Vibha-Barr virus quantitative was ordered for 10/7/2022  -Continue 3 drug immunosuppression with azathioprine, prednisone, and tacrolimus  -Prednisone taper to be performed on 10/13/2022  -Continue oxycodone at 5 mg every 4 hours as needed for chest pain related to left-sided chest tube  -The patient declined any further lidocaine patches since 2 patches of lidocaine daily did not help with the pain  -Started Robaxin as needed for adjunct therapy for pain management  -Increased gabapentin to 200 mg nightly  -Continue BiPAP as detailed above  -Continue valganciclovir for cytomegalovirus prophylaxis through 9/28/2022 -- renally dosed for eGFR  -Continue dapsone for PJP prophylaxis  -Continue folic acid while on dapsone  -Continue nystatin.  Prophylaxis protocol post lung transplant  -I highly appreciate input of transplant pulmonology service        #Severe gastroparesis s/p  GJ tube placement 7/27/2022  #Significant delayed gastric emptying s/p gastrojejunostomy tube placement, present on admission  We will continue to utilize jejunal tube for nutrition.  I ordered a follow-up nuclear medicine gastric emptying study for evaluation of any hopefully improvement in the patient's gastric emptying  :: repeat GE study planned for 9/30/22 NPO at midnight 9/29  :: RD nutrition consult placed for TF  :: Percutaneous j tube in place with G venting to gravity      #Steroid-induced hyperglycemia and diabetes mellitus  :: continue insulin Lantus with insulin NovoLog sliding  scale    #HTN  # A flutter with RVR/SVT  ::  Has recently completed zio patch.  :: Continue PTA metoprolol 50 mg BID    # Acute blood loss anemia secondary to pyloric ulcer on top of chronic anemia of chronic disease, all are present on admission  -Continue pantoprazole 40 mg twice daily  -Repeat endoscopy for October 2022    #Acute metabolic encephalopathy secondary to urinary tract infection, not clear if present on admission, ordered a total of 5-day course of ceftriaxone for which the patient encephalopathy resolved     # extra hepatic and intrahepatic biliary dilation c/f hemobilia   # Intra ductal papillary mucinous neoplasm   :: s/p ERCP 8/11 showed hemobilia.   :: Monitor LFTs      #Acute on chronic heart failure with preserved ejection fraction LVEF 65%, the patient was appropriately diuresed, follow-up as outpatient         Diet: Snacks/Supplements Adult: Nepro Oral Supplement; Between Meals  Adult Formula Drip Feeding: Continuous Nepro with Carbsteady; Jejunostomy; Goal Rate: 40 ml/hr--okay to begin at goal once new formula arrives on unit.; mL/hr; Medication - Feeding Tube Flush Frequency: At least 15-30 mL water before and after med...  Full Liquid Diet    DVT Prophylaxis: Heparin SQ  Dong Catheter: Not present  Central Lines: PRESENT  CVC Double Lumen Right Internal jugular Tunneled-Site Assessment: WDL except;Drainage  Cardiac Monitoring: None  Code Status: Full Code      Disposition Plan      Expected Discharge Date: 10/03/2022,  9:00 AM      Discharge Comments: Decision will need to be made as the patient would need dialysis after discharge or not.  The patient received dialysis on 9/27 and 9/28 while awaiting improvement of her kidney function.  Hopefully the patient would not need dialysis upon discharge. TBD -staying at the [facility] for another several months, so [employer] would probably work. She will need transportation.      The patient's care was discussed with the Patient,  Patient's Family and Pulmonary Consultant.    Greg Mujica MD  Hospitalist Service, GOLD TEAM 10  [facility]  Securely message with the thesweetlink Web Console (learn more here)  Text page via [facility] Paging/Directory   Please see signed in provider for up to date coverage information      Clinically Significant Risk Factors Present on Admission                      ______________________________________________________________________    Interval History   The patient reports that she felt okay with dialysis; run today was fine until the end, then nasea wo vomit  Says otherwise fine, no pain no SOB no CP no FCS  Eager to get GE study done and she wants update about her gastric mptying  Ok with trial of zofran for post HD nausea and will resume tube feeds few hrs later, ramp up to goal slowly by tomorrow AM  Four-point review of systems is otherwise negative.    Data reviewed today: I reviewed all medications, new labs and imaging results over the last 24 hours.     Physical Exam   Vital Signs: Temp: 98.4  F (36.9  C) Temp src: Oral BP: 127/68 Pulse: 92   Resp: 18 SpO2: 95 % O2 Device: None (Room air) Oxygen Delivery: 1 LPM  Weight: 158 lbs 4.8 oz  EXAM  General: frail appearing woman sitting up in chair, NAD  Head: NC, AT  Eye: symm gaze, anicteric sclerae  ENT: patent nares wo drainage/epistaxis, MMM  Pulm: CTAB, comfortable WOB on RA  CV: normal rate, reg rhythm; CVC site wo erythema, mildly tender to palp;  GI: soft, NTND  Neuro: awake, alert, hearing speech and phonation, intact grossly           Data   Recent Labs   Lab 09/28/22  1616 09/28/22  1008 09/28/22  0809 09/28/22  0514 09/27/22  0950 09/27/22  0511 09/26/22  0913 09/26/22  0555   WBC  --   --   --  10.5  --  8.4  --  8.5   HGB  --   --   --  8.4*  --  7.9*  --  8.2*   MCV  --   --   --  102*  --  105*  --  103*   PLT  --   --   --  231  --  217  --  224   NA  --   --   --  133*  --  135*  --  137   POTASSIUM   --   --   --  4.2  --  4.1  --  4.9   CHLORIDE  --   --   --  95*  --  91*  --  87*   CO2  --   --   --  32*  --  39*  --  44*   BUN  --   --   --  39.2*  --  63.4*  --  96.7*   CR  --   --   --  1.44*  --  1.48*  --  1.72*   ANIONGAP  --   --   --  6*  --  5*  --  6*   ESTUARDO  --   --   --  8.9  --  9.0  --  9.4   * 190* 133* 143*   < > 173*   < > 148*   ALBUMIN  --   --   --  2.9*  --  3.1*  --  3.1*   PROTTOTAL  --   --   --  5.1*  --  5.2*  --  5.4*   BILITOTAL  --   --   --  0.2  --  0.2  --  0.2   ALKPHOS  --   --   --  86  --  85  --  79   ALT  --   --   --  13  --  12  --  11   AST  --   --   --  20  --  21  --  25    < > = values in this interval not displayed.     No results found for this or any previous visit (from the past 24 hour(s)).  Medications     dextrose       - MEDICATION INSTRUCTIONS -       - MEDICATION INSTRUCTIONS -         azaTHIOprine  100 mg Per J Tube Daily     banatrol plus  1 packet Per Feeding Tube BID     calcium carbonate 600 mg-vitamin D 400 units  1 tablet Per J Tube BID w/meals     cyanocobalamin  500 mcg Per Feeding Tube Daily     dapsone  50 mg Per J Tube Q Mon Wed Fri AM     folic acid  1 mg Oral or Feeding Tube Daily     gabapentin  200 mg Oral At Bedtime     sodium chloride (PF) 0.9%  1.3-2.6 mL Intracatheter Once    Followed by     heparin  3 mL Intracatheter Once     sodium chloride (PF) 0.9%  1.3-2.6 mL Intracatheter Once    Followed by     heparin  3 mL Intracatheter Once     heparin ANTICOAGULANT  5,000 Units Subcutaneous Q12H     insulin aspart  1-6 Units Subcutaneous Q6H     [Held by provider] insulin glargine  6 Units Subcutaneous QAM AC     lidocaine  2 patch Transdermal Q24h    And     lidocaine   Transdermal Q8H [facility]     menthol  1 patch Topical QAM    And     menthol   Transdermal Q8H     menthol   Transdermal Daily     metoprolol  12.5 mg Per J Tube BID     multivitamins w/minerals  15 mL Per Feeding Tube Daily     nystatin  1,000,000 Units Swish & Swallow  4x Daily     pantoprazole  40 mg Per J Tube BID     prednisoLONE  10 mg Per J Tube Daily     prednisoLONE  7.5 mg Per J Tube QPM     sodium chloride (PF)  3 mL Intracatheter Q8H     tacrolimus  4 mg Per J Tube QPM     tacrolimus  4.5 mg Per J Tube QAM     **a portion of my previous note is copied and pasted above, it has been edited as needed to reflect events for today**

## 2022-09-28 NOTE — PLAN OF CARE
Pt admitted 9/9 txp pulm w/up for rejection vs infection with ESRD.  Consecutive days of HD and felt nauseated/malaise after today's run.  TF ran up until 1300 and turned off r/t nausea.  Gave prn Zofran and hook G tube up to gravity.  No tele orders, VS'S on RA (1 L NC HS) with incisional pain and medicated with prn Oxy.  NPO at midnight for gastric emptying study.  CXR ordered for am and CT remains clamped.  Continue to monitor and with POC.

## 2022-09-28 NOTE — PLAN OF CARE
Goal Outcome Evaluation:    [id number]  D: Patient presented with multiple days of coughing bloody sputum and was admitted on 9/9/22 for AMR.   I/A: Patient is now on HD, on oxygen via NC 1L at HS, she has 1-chest-tube that is clamped and dressing c/d/i. Tubefeed at 40ml/hr with 30cc of water flushes and KX=961 with insulin coverage.  Patient c/o diarrhea and asked for loperamide and was given via J-tube.  Up with SBA to the commode X2.  P: Plan for discharge on 10/3/22, facilitate HD today with weights.

## 2022-09-28 NOTE — PLAN OF CARE
VSS. Room air. Complains of pain at tunneled dialysis line site, relieved with PRN oxycodone. 3 loose BMs this shift. Imodium given x1. Up to bathroom with SBA for help with lines. Chest tube clamped. Chest CT completed. Tube feedings infusing through J-tube @ 40ml/hr. Tolerating small meals (Full liquid diet). Plan for dialysis tomorrow. Continue to monitor patient. Notify Gold 10 with changes or concerns.

## 2022-09-28 NOTE — PROGRESS NOTES
Nephrology Progress Note  09/28/2022     Ms Rodriguez is a 63 yo female with PMH end stage COPD s/p bilateral sequential lung transplant (6/22) on triple IS ( MMF/Tacro/pred), pyloric ulcer, recent ERCP c/f hemobilia, gastroparesis s/p GJ tube placement and Percutaneous J tube , BACILIO requiring short term HD, admitted 9/9/22 for planned admission from transplant pulmonology to work up antibody medicated rejection versus infection. She was found to have ESRD with Cystatin C GFR of 10 ml/mn.      Assessment & Recommendations:     1. ESRD based upon Cystatin C ( GFR of 10) - Creat has remained stable in the mid to upper 1 range, but BUN was elevated out of proportion to her creat.    - It was felt that patient's symptoms of fatigue, poor appetite, recent confusion and elevated BUN indicated poor renal function. Given low muscle mass Cystatin C was obtained and found to be 10 ml/mn   - Admission BUN was 82.3. She peaked at 112 on 9/20. BUN down to 39 today.      - Tunneled HD line placed 9/26/22   - Patient initiated HD 9/26/22. Has completed 3 daily sessions. Next run Friday. Will follow [facility] schedule.     - RNCC has initiated OP HD at [facility]. the schedule doesn't matter. Whatever is available. Patient is staying at the [facility] for another several months, so [facility] would be ideal. RNCC working on transportation as well.      2. Volume status - No significant volume issue. Diarrhea persists and felt to be 2/2 TF. Intake 1240.  ml + 3. Bps teens-130/. Pre run weight 71.8 kg. Admission weight 73.4 kg   - No fluid removal on HD   - Please get standing pre run weights   - Continue strict I/O    3. CV - Pre run b/ps teens-130/. No edema. On Metoprolol 12.5 mg bid    4. Lung transplant IS: AZa, Pred, Tac. Tac level 11.6   - Per transplant    5. Electrolytes - No acute concerns. K 4.2, Na 133    6. Acid base - No acute concerns. Bicarb 32    7. BMD - Ca 8.9, Phos 3.2, albumin 2.9   - PTH 46, Vit D  "25 ( 9/22)   - Continue Ca/D    8. Anemia - Hgb 8.4   - Last RBC 8/31   - Pyloric ulcer per EGD 8/3/22   - Ferritin 769, Fe 54 IS 22 ( 9/22)   - On B12, folic acid, PPI   - Began Epo 5000 U IV q run 9/27    9. Steroid induced DM - On insulin   - Per primary team    Recommendations were communicated to primary team via progress note    Emili Nolasco NP   Division of Renal Disease and Hypertension  [facility]  myairmail  Vocera Web Console    Interval History :   Nursing and provider notes from last 24 hours reviewed.  Seen on HD.   Tolerating w/o problem    Review of Systems:   I reviewed the following systems:  GI: Tolerating some oral intake but mostly receiving TF through her J tube. Diarrhea continues  Neuro:  Alert/interactive  Constitutional:  no fever or chills  CV: Overall her breathing has improved from hospital admission. Still has Chest tube. No edema, CP   : Non oliguric    Physical Exam:   I/O last 3 completed shifts:  In: 1120 [NG/GT:300]  Out: 500 [Urine:100; Other:400]   /68 (BP Location: Right arm)   Pulse 92   Temp 98.4  F (36.9  C) (Oral)   Resp 18   Ht 1.588 m (5' 2.5\")   Wt 71.8 kg (158 lb 4.8 oz)   SpO2 98%   BMI 28.49 kg/m       GENERAL APPEARANCE: Comfortable on HD.   EYES:  no scleral icterus, pupils equal  PULM: lungs CTA. Breathing is non labored. Off supplemental oxygen. Chest tube present  CV: tachy      -edema- none   GI: soft, NT, Non distended.   INTEGUMENT: no  rash  NEURO:  Alert/interactive  Access - Right TDC    Labs:   All labs reviewed by me  Electrolytes/Renal -   Recent Labs   Lab Test 09/28/22  1616 09/28/22  1008 09/28/22  0809 09/28/22  0514 09/27/22  0950 09/27/22  0511 09/26/22  0913 09/26/22  0555   NA  --   --   --  133*  --  135*  --  137   POTASSIUM  --   --   --  4.2  --  4.1  --  4.9   CHLORIDE  --   --   --  95*  --  91*  --  87*   CO2  --   --   --  32*  --  39*  --  44*   BUN  --   --   --  39.2*  --  63.4*  --  96.7*   CR  --   --   --  1.44*  --  " 1.48*  --  1.72*   * 190* 133* 143*   < > 173*   < > 148*   ESTUARDO  --   --   --  8.9  --  9.0  --  9.4   MAG  --   --   --  2.0  --  2.3  --  2.7*   PHOS  --   --   --  3.2  --  3.5  --  3.7    < > = values in this interval not displayed.       CBC -   Recent Labs   Lab Test 09/28/22  0514 09/27/22  0511 09/26/22  0555   WBC 10.5 8.4 8.5   HGB 8.4* 7.9* 8.2*    217 224       LFTs -   Recent Labs   Lab Test 09/28/22  0514 09/27/22  0511 09/26/22  0555   ALKPHOS 86 85 79   BILITOTAL 0.2 0.2 0.2   ALT 13 12 11   AST 20 21 25   PROTTOTAL 5.1* 5.2* 5.4*   ALBUMIN 2.9* 3.1* 3.1*       Iron Panel -   Recent Labs   Lab Test 09/26/22  0555 09/03/22  1039 08/24/22  0810   IRON 54 21* 41   IRONSAT 22 9* 21   CARLOS 769* 343* 334*         Imaging:    Chest 2 views     INDICATION: Interval follow-up, chest tubes, lung transplant history     COMPARISON: 9/25/2022     FINDINGS: Heart size upper normal. Clamshell sternotomy from prior  bilateral lung transplant again evident. Left costophrenic angle  blunting unchanged. Left basilar chest catheter again noted. Right IJ  approach venous catheter noted with tip near the cavoatrial junction.  No pneumothorax. Scattered right lung subsegmental atelectasis.                                                                      IMPRESSION: Bilateral lung transplantation. Continued left pleural effusion and associated atelectasis. Scattered right lung subsegmental  atelectasis.     ALVINA HARRY MD     Current Medications:    azaTHIOprine  100 mg Per J Tube Daily     banatrol plus  1 packet Per Feeding Tube BID     calcium carbonate 600 mg-vitamin D 400 units  1 tablet Per J Tube BID w/meals     cyanocobalamin  500 mcg Per Feeding Tube Daily     dapsone  50 mg Per J Tube Q Mon Wed Fri AM     folic acid  1 mg Oral or Feeding Tube Daily     gabapentin  200 mg Oral At Bedtime     sodium chloride (PF) 0.9%  1.3-2.6 mL Intracatheter Once    Followed by     heparin  3 mL  Intracatheter Once     sodium chloride (PF) 0.9%  1.3-2.6 mL Intracatheter Once    Followed by     heparin  3 mL Intracatheter Once     heparin ANTICOAGULANT  5,000 Units Subcutaneous Q12H     insulin aspart  1-6 Units Subcutaneous Q6H     [Held by provider] insulin glargine  6 Units Subcutaneous QAM AC     lidocaine  2 patch Transdermal Q24h    And     lidocaine   Transdermal Q8H [facility]ONY     menthol  1 patch Topical QAM    And     menthol   Transdermal Q8H     menthol   Transdermal Daily     metoprolol  12.5 mg Per J Tube BID     multivitamins w/minerals  15 mL Per Feeding Tube Daily     nystatin  1,000,000 Units Swish & Swallow 4x Daily     pantoprazole  40 mg Per J Tube BID     prednisoLONE  10 mg Per J Tube Daily     prednisoLONE  7.5 mg Per J Tube QPM     sodium chloride (PF)  3 mL Intracatheter Q8H     tacrolimus  4 mg Per J Tube QPM     tacrolimus  4.5 mg Per J Tube QAM       dextrose       - MEDICATION INSTRUCTIONS -       - MEDICATION INSTRUCTIONS -       Emili Nolasco, NP

## 2022-09-28 NOTE — PROGRESS NOTES
Care Management Follow Up    Length of Stay (days): 19    Expected Discharge Date: 10/03/2022     Concerns to be Addressed: all concerns addressed in this encounter     Patient plan of care discussed at interdisciplinary rounds: Yes    Anticipated Discharge Disposition: Return to [facility]     Anticipated Discharge Services: OP HD, Home Infusion (resumption)  Anticipated Discharge DME: TBD    Education Provided on the Discharge Plan: Yes  Patient/Family in Agreement with the Plan: Yes    Referrals Placed by CM/SW: Outpatient dialysis   Private pay costs discussed: Not applicable    Additional Information:  This writer met with pt to attempt to discuss discharge planning. Pt was feeling too nauseated to talk much at this time. This writer briefly discussed initiating outpatient dialysis referral. Pt in agreement and would like to proceed with referral to unit closest to [facility], which is [facility] Pt inquired if there is a shuttle. This writer unaware of a shuttle but pt does have [employer] benefits, discussed this option with patient. Will discuss further how to utilize transportation when pt feeling up to discussion. [employer] info placed on AVS.    Referral faxed to [facility] (Phone: [phone], Fax: [phone]) requesting placement at [facility].   Paged provider to order Hep B core antibody, surface antigen and surface antibody as well as quantiferon gold.Will need to fax to [facility] once resulted.     [employer] [facility]  Phone: [phone]    [facility] (resumption of home tube feeds)  Phone: [phone]    [facility] (Home Oxygen)    CC will continue to monitor patient's medical condition and progress towards discharge.  Sana Castillo RN BSN  6C Unit Care Coordinator  Phone number: [phone]  Pager: [phone]

## 2022-09-28 NOTE — PROGRESS NOTES
Pulmonary Medicine  Cystic Fibrosis - Lung Transplant Team  Progress Note  2022       Patient: Sofie Rodriguez  MRN: [id number]  : 1960 (age 62 year old)  Transplant: 2022 (Lung), POD#92  Admission date: 2022    Assessment & Plan:     Sofie Rodriguez is a 62-year-old female s/p BSLT (22) for COPD complicated by persistent bilateral pleural effusions, persistent CO2 retention, right hemidiaphragm palsy, BACILIO (dialysis -), C. diff colitis, gastroparesis s/p GJ tube placement (22), GI bleed 2/2 pyloric ulcer, hemobilia s/p ERCP and MRCP (), and persistent vasoplegia.  Other history notable for HFpEF, NTM colonoization, hepatitis C, and methamphetamine use.  Patient admitted 22 with persistent dyspnea (increased with activity), daily morning hemoptysis, and increased BLE edema.  Also noted to have persistent, increased bilateral pleural effusions, diffuse bilateral groundglass changes, and more focal appearing LLL infiltrates on imaging.  Treating with aggressive diuresis with some improvement in symptoms and hypoxia.  Hemoptysis resolved, mild intermittent hypoxia and ANAYA persists.  S/p right pigtail chest tube and aspiration of left pleural effusion () with subsequent pigtail chest tube (9/15) with lytic therapy.  Right chest tube removed .  Worsening mentation and tremors noted .  Also, with worsening hypercapnia, treated with NIPPV for several days.  AMS resolved, likely d/t ABX for UTI as elevated BUN has not changed, additional workup unremarkable.  Left chest tube clamped .     Today's recommendations:  - Sputum culture ordered if able to collect  - Exercise and overnight oximetry study 24-48h prior to discharge  - VBG ordered tomorrow  - Defer daily CXR today per Dr. Gong given chest CT without contrast yesterday, left chest tube clamped, defer removal today and revisit tomorrow pending review at lung transplant committee meeting  - Considering  chest CT with contrast to assess vascular anatomy and patency given CT findings as below; primary team to discuss with nephrology   - Tacrolimus level ordered tomorrow  - Prednisone taper due 10/13 (not yet ordered)  - CMV (9/28) pending with plan for every other week monitoring (starting 10/5, ordered)  - DSA and EBV ordered 10/5  - IgG ordered tomorrow  - G tube to gravity drainage with GI symptoms (N/V, bloating, reflux)  - Repeat gastric emptying study to evaluate for ability to transition medications to PO and progress diet, awaiting scheduling  - Repeat EGD 10/13 to check healing of ulcer, sooner if hgb declines further     S/p bilateral sequential lung transplant (BSLT) for end stage COPD:  Acute hypoxia with chronic hypercapnia:   Pulmonary edema:  Persistent bibasilar pleural effusions:   Right hemidiaphragm palsy: Admitted with increased dyspnea with minimal exertion and small volume daily hemoptysis.  Also with marked decline in PFTs (FEV1 1.22L, 50% on 8/18 to 0.98L, 40% on 9/7).  Chest CT (9/8) with increased effusions and groundglass changes.  DSA positive but stable (as below).  BNP markedly elevated (30k) and also with increased BLE edema.  Etiology unclear and is likely multi-factorial with DDx including pulmonary edema from hypervolemia/progressive HFpEF, rejection (ACR +/- AMR), alveolar hemorrhage, and/or infection.  Aggressively diuresed with some improvement in symptoms.  Bronch (9/13) unremarkable, BAL of lingula sent, cultures no growth (GPC on gram stain only).  S/p right pigtail chest tube placement (9/13-9/22), transudative with moderate output initially but quickly decreasing, cultures negative.  S/p aspiration of left pleural effusion (9/13) and then pigtail chest tube (9/15) s/p full lytic course (916-9/19), cultures also negative, clamped 9/26.  Chest CT (9/21) with decreased pleural effusions with continued prominent loculated component of right effusion and overall slight decrease in  associated atelectasis.  Chest CT (9/27) with grossly unchanged small bilateral hydroPTX with left chest tube coiled in inferior medial left pleural space, continued resolution of nodular opacity in posterior MARLON, and the lobes of the left upper and lower are rotated with respect to each other.  Still with some ANAYA but no further hemoptysis since ~9/12.  Placed on continuous BiPAP 9/19-9/22 due to worsening hypercapnia and mentation, since improved.  Unable to collect sputum sample, respiratory ABX coverage deferred.  Weaned to RA at rest on 9/24, using 1L NC overnight.  - Sputum culture ordered if able to collect  - Supplemental O2 to keep >92%, exercise and overnight oximetry study 24-48h prior to discharge  - Continue to defer NIPPV, repeat VBG prn with change in mentation/clinical status and plan to repeat VBG 9/29 for monitoring (ordered)  - IS and Aerobika q1h w/a and encourage OOB during the day as much as able  - Left chest tube clamped (9/26), air leak noted 9/23, defer daily CXR today per Dr. Gong given chest CT without contrast yesterday, defer left chest tube removal today and revisit tomorrow pending review at lung transplant committee meeting  - Considering chest CT with contrast to assess vascular anatomy and patency given above finding (lobes of the left upper and lower are rotated with respect to each other); primary team to discuss with nephrology      Immunosuppression:  - Tacrolimus 4.5 mg qAM / 4 mg qPM (increased 9/19).  Goal level 8-12.  Repeat level 9/29 (ordered).  - Azathioprine 100 mg daily (9/20, MMF stopped due to ongoing diarrhea)  - Prednisone 10 mg qAM / 7.5 mg qPM with next taper due 10/13 (not yet ordered), defer accelerated taper (with elevated BUN) at this time  Date AM dose (mg) PM dose (mg)   9/15/22 10 7.5   10/13/22 7.5 7.5   11/10/22 7.5 5   12/8/22 5 5   1/5/23 5 2.5      Prophylaxis:   - Dapsone qMWF for PJP ppx (resumed 9/19, monitor for worsening anemia; Bactrim stopped  d/t hyperkalemia, will need to monitor for recurrence of anemia with dapsone; Pentamidine neb not tolerated on 9/7 after only 5 minutes d/t excessive coughing)  - Completed VGCV for CMV ppx 9/26 (through POD#90), D+/R+, CMV (9/28) pending with monitoring every other week (starting 10/5, ordered);      Positive DSA: Newly positive on 8/10 with DQB2 mfi 2155.   - Monitoring [id number], next due 10/5 (ordered), see below for trend:  Date DQB2 Notes   8/10 2155     9/1 7033 Current peak   9/21 5390 Most recent       EBV viremia: Low level (1374 on 9/7), not likely to be clinically significant.  - Follow EBV monthly (10/5, ordered)     Hypogammaglobulinemia: IgG adequate at time of transplant, repeat level low (336) on 7/28.  S/p IVIG 7/30, tolerated well.  IgG on 8/29 adequate at 672.   - Follow IgG monthly (9/29, ordered)     Other relevant problems being managed by the primary team:     Encephalopathy, Resolved:  Tremors:   Presumed UTI: Noted 9/19 with confusion as well as tremor.  DDx including hypercapnia, hyperammonia, infection, uremia (see below), medication related.  VBG with worsening hypercapnia (99).  BUN elevated (112).  Ammonia normal (24).  CRP normal, procal 0.1.  UA with moderate blood, large leukocyte esterase, 3 RBC, 29 WBC, and many bacteria.  Urine culture (9/19) with mixture of urogenital josue.  Tacrolimus levels recently were subtherapeutic.  Head CT (9/20) without acute intracranial pathology.  Blood culture (9/19) NGTD.  AMS resolved ~9/22.  S/p ceftriaxone 5-day course (9/20-9/24).     ESRD based upon Cystatin C (GFR of 10): Nephrology consulted 9/21, see their note for details, with concern for CKD5 as Cr may suggest overestimation of kidney function with limited muscle mass, unclear if trend over the past week reflects BACILIO.  Cystatin C 4.3 with GFR 10.  Making urine.  Renal US (9/22) normal.  HD trial discussed with pt. and daughter on 9/22, tunneled line placed and HD initiated  9/26.  - Management per nephrology     Diarrhea: C diff negative on 9/10 and 9/20.  Likely medication related (MMF), but unable to transition to Myfortic given NPO.  Loperamide utilized with some benefit.  Goal to titrate to antidiarrheal 3 stools daily, management per primary.  Enteric stool panel negative 9/16.  Transitioned from MMF to AZA as above.      Severe gastroparesis:   Pyloric ulcer: Gastric emptying study 7/20 with severe gastric emptying delay (95% retention at 4 hours), s/p GJ tube placement in IR 7/27.  S/p EGD (8/3) noted to have pyloric ulcer and excessive gastric fluid and residual food.  S/p EGD (8/11) with mild erythema 2/2 GJ tube trauma seen near insertion site but no active bleeding.  - PPI BID  - TF continuous and ALL enteral medications via J tube  - G tube to gravity drainage prn with GI symptoms (N/V, bloating, reflux); full liquid diet for comfort only  - Repeat gastric emptying study to evaluate for ability to transition medications to PO and progress diet, awaiting scheduling  - Repeat EGD 10/13 to check healing of ulcer, sooner if hgb declines further    We appreciate the excellent care provided by the [place] [id number] team.  Recommendations communicated via in person rounding and this note.  Will continue to follow along closely, please do not hesitate to call with any questions or concerns.    Patient discussed with Dr. Gong.    Yuliet Aviles PA-C  Inpatient [facility]  Pulmonary CF/Transplant     Subjective & Interval History:     Remains on RA during the day with 1L NC overnight for SpO2 in the 80s.  Denies significant dyspnea, cough nonproductive.  Left chest tube remains clamped.  Tolerating HD, no fluid pulled.  Some tenderness at HD line site and left chest tube site.  Some abdominal discomfort yesterday evening, has since improved.  Ongoing diarrhea, using Imodium.  Having a small amount of full liquid diet otherwise on TF.    Review of Systems:     C: + low grade temp, no chills,  "+ increased weight  INTEGUMENTARY/SKIN: No rash or obvious new lesions  ENT/MOUTH: No sore throat, no sinus pain, no nasal congestion or drainage  RESP: See interval history  CV: No chest pain, no palpitations, + peripheral edema  GI: No vomiting, no reflux  : No dysuria  MUSCULOSKELETAL: See interval history  ENDOCRINE: Blood sugars with adequate control  NEURO: No headache, no numbness or tingling  PSYCHIATRIC: Mood stable    Physical Exam:     All notes, images, and labs from past 24 hours (at minimum) were reviewed.    Vital signs:  Temp: 98.7  F (37.1  C) Temp src: Oral BP: 125/70 Pulse: 92   Resp: 18 SpO2: 97 % O2 Device: Nasal cannula Oxygen Delivery: 1 LPM Height: 158.8 cm (5' 2.5\") Weight: 71.8 kg (158 lb 4.8 oz)  I/O:     Intake/Output Summary (Last 24 hours) at 9/28/2022 1151  Last data filed at 9/28/2022 0700  Gross per 24 hour   Intake 1040 ml   Output 440 ml   Net 600 ml     Constitutional: Lying in bed at dialysis, in no apparent distress.   HEENT: Eyes with pink conjunctivae, anicteric.  Oral mucosa moist without lesions.    PULM: Diminished air flow to bases bilaterally.  Occasional crackle.  No rhonchi, no wheezes.  Non-labored breathing on RA.  Left chest tube clamped.  CV: Normal S1 and S2.  RRR.  + systolic murmur.  No gallop or rub.  + BLE edema (wrapped).   ABD: NABS, soft, nontender, nondistended.  PEG/J tube site not visualized.   MSK: Moves all extremities.    NEURO: Alert, conversant.  + mild tremors (minimal).  SKIN: Warm, dry.  No rash on limited exam.   PSYCH: Mood stable.     Lines, Drains, and Devices:  Peripheral IV 09/10/22 Anterior;Left Lower forearm (Active)   Site Assessment WDL 09/27/22 1600   Line Status Saline locked 09/27/22 2300   Dressing Intervention New dressing  09/10/22 0135   Phlebitis Scale 0-->no symptoms 09/27/22 2300   Infiltration Scale 0 09/27/22 0000   Number of days: 18       CVC Double Lumen Right Internal jugular Tunneled (Active)   Site Assessment WDL " except;Drainage 09/28/22 0845   Dressing Type Chlorhexidine disk;Transparent 09/28/22 0845   Dressing Status dry;intact 09/28/22 0845   Dressing Change Due 10/03/22 09/27/22 2300   Line Necessity yes, meets criteria 09/28/22 0845   Blue - Status infusing 09/28/22 0850   Blue - Cap Change Due 10/04/22 09/27/22 1205   Red - Status infusing 09/28/22 0850   Red - Cap Change Due 10/04/22 09/27/22 1205   Number of days: 2     Data:     LABS    CMP:   Recent Labs   Lab 09/28/22  1008 09/28/22  0809 09/28/22  0514 09/28/22  0353 09/27/22  0950 09/27/22  0511 09/26/22  0913 09/26/22  0555 09/25/22  0933 09/25/22  0736 09/24/22  1026 09/24/22  0615   NA  --   --  133*  --   --  135*  --  137  --   --   --  141   POTASSIUM  --   --  4.2  --   --  4.1  --  4.9  --   --   --  4.1   CHLORIDE  --   --  95*  --   --  91*  --  87*  --   --   --  89*   CO2  --   --  32*  --   --  39*  --  44*  --   --   --  44*   ANIONGAP  --   --  6*  --   --  5*  --  6*  --   --   --  8   * 133* 143* 172*   < > 173*   < > 148*   < >  --    < > 163*   BUN  --   --  39.2*  --   --  63.4*  --  96.7*  --   --   --  99.5*   CR  --   --  1.44*  --   --  1.48*  --  1.72*  --   --   --  1.59*   GFRESTIMATED  --   --  41*  --   --  40*  --  33*  --   --   --  37*   ESTUARDO  --   --  8.9  --   --  9.0  --  9.4  --   --   --  9.5   MAG  --   --  2.0  --   --  2.3  --  2.7*  --  2.7*  --  2.8*   PHOS  --   --  3.2  --   --  3.5  --  3.7  --   --   --   --    PROTTOTAL  --   --  5.1*  --   --  5.2*  --  5.4*  --   --   --  5.3*   ALBUMIN  --   --  2.9*  --   --  3.1*  --  3.1*  --   --   --  3.0*   BILITOTAL  --   --  0.2  --   --  0.2  --  0.2  --   --   --  <0.2   ALKPHOS  --   --  86  --   --  85  --  79  --   --   --  87   AST  --   --  20  --   --  21  --  25  --   --   --  17   ALT  --   --  13  --   --  12  --  11  --   --   --  10    < > = values in this interval not displayed.     CBC:   Recent Labs   Lab 09/28/22  0514 09/27/22  0511 09/26/22  0555  09/24/22  0615   WBC 10.5 8.4 8.5 7.1   RBC 2.65* 2.51* 2.65* 2.55*   HGB 8.4* 7.9* 8.2* 7.9*   HCT 27.0* 26.3* 27.4* 26.9*   * 105* 103* 106*   MCH 31.7 31.5 30.9 31.0   MCHC 31.1* 30.0* 29.9* 29.4*   RDW 19.8* 19.9* 19.9* 19.6*    217 224 170       INR: No lab results found in last 7 days.    Glucose:   Recent Labs   Lab 09/28/22  1008 09/28/22  0809 09/28/22  0514 09/28/22  0353 09/27/22  2116 09/27/22  1637   * 133* 143* 172* 188* 166*       Blood Gas:   Recent Labs   Lab 09/22/22  0551 09/21/22  1648   PHV 7.42 7.41   PCO2V 82* 82*   PO2V 26 24*   HCO3V 53* 52*   ILNDY 23.8* 24.2*   O2PER 30 1       Culture Data No results for input(s): CULT in the last 168 hours.    Virology Data:   Lab Results   Component Value Date    FLUAH1 Not Detected 09/13/2022    FLUAH3 Not Detected 09/13/2022    IR1316 Not Detected 09/13/2022    IFLUB Not Detected 09/13/2022    RSVA Not Detected 09/13/2022    RSVB Not Detected 09/13/2022    PIV1 Not Detected 09/13/2022    PIV2 Not Detected 09/13/2022    PIV3 Not Detected 09/13/2022    HMPV Not Detected 09/13/2022       Historical CMV results (last 3 of prior testing):  Lab Results   Component Value Date    CMVQNT Not Detected 09/13/2022    CMVQNT Not Detected 09/07/2022    CMVQNT Not Detected 09/01/2022     No results found for: CMVLOG    Urine Studies    Recent Labs   Lab Test 09/19/22  2254 08/01/22  0319 07/08/22  0831   URINEPH 7.0 8.0* 5.5   NITRITE Negative Negative Negative   LEUKEST Large* Negative Negative   WBCU 29*  --  1       Most Recent [place] Pulmonary Function Testing (FVC/FEV1 only)  FVC-Pre   Date Value Ref Range Status   09/07/2022 1.01 L    09/01/2022 1.07 L    08/24/2022 1.23 L    08/18/2022 1.33 L      FVC-%Pred-Pre   Date Value Ref Range Status   09/07/2022 33 %    09/01/2022 35 %    08/24/2022 40 %    08/18/2022 43 %      FEV1-Pre   Date Value Ref Range Status   09/07/2022 0.98 L    09/01/2022 1.02 L    08/24/2022 1.17 L    08/18/2022 1.22 L       FEV1-%Pred-Pre   Date Value Ref Range Status   09/07/2022 40 %    09/01/2022 42 %    08/24/2022 48 %    08/18/2022 50 %        IMAGING    Recent Results (from the past 48 hour(s))   XR Chest 2 Views    Narrative    EXAM: XR CHEST 2 VIEWS  9/27/2022 1:55 PM     HISTORY:  chest tube - Clamped, assessing Ptx.       COMPARISON:  Chest x-ray 9/26/2022    FINDINGS:   PA and views of the chest. Post surgical changes of bilateral lung  transplant with sternotomy wires. Right IJ central venous catheter tip  projects over the superior cavoatrial junction. Grossly stable  position of left chest pigtail drain. Stable small left pleural  effusion. Small amount of fluid tracking in the right minor fissure.  No appreciable pneumothorax.      Impression    IMPRESSION:   1. No appreciable pneumothorax following clamping of the left chest  tube.  2. Stable small left pleural effusion.    I have personally reviewed the examination and initial interpretation  and I agree with the findings.    GEORGE ROGERS MD         SYSTEM ID:  [id number]   CT Chest w/o Contrast    Narrative    EXAMINATION: Chest CT  9/27/2022 4:13 PM    CLINICAL HISTORY: chest tube and air leak    COMPARISON: CT chest 9/21/2022.    TECHNIQUE: CT imaging obtained through the chest without contrast.  Axial, coronal, and sagittal reconstructions and axial MIP reformatted  images are reviewed.     CONTRAST: None    FINDINGS:  Support devices: Right IJ central venous catheter tip terminates in  the right atrium. Left basilar chest tube is coiled in the inferior  medial left pleural space.    Lungs: Postsurgical changes of bilateral lung transplant. No  significant narrowing on either bronchial anastomosis. Mild adherent  debris in the upper trachea. The left upper lobe and left lower lobe  are rotated with respect to each other with displacement of the  fissure. The airways otherwise patent. No significant change in the  small bilateral hydropneumothoraces. Small  amount of fluid tracking in  the right oblique fissure. Continued resolution of the nodular opacity  in the posterior portion of the left upper lobe, significantly  decreased in size since the 9/8/2022 chest CT. No new airspace  consolidation. Mild interstitial thickening in the lung bases.    Mediastinum: The thyroid is unremarkable. Cardiac size is normal.  Normal caliber aorta and main pulmonary artery. No significant  pericardial effusion. Moderate coronary artery calcium. No thoracic  lymphadenopathy. Esophagus is normal in caliber.    Bones and soft tissues: No suspicious bone findings. Intact clam shell  sternotomy wires.    Upper Abdomen: Limited evaluation of the upper abdomen. Central  pneumobilia.      Impression    IMPRESSION:   1. Grossly unchanged small bilateral hydropneumothoraces with left  chest tube coiled in the inferior medial left pleural space.  2. Continued resolution of the nodular opacity in the posterior left  upper lung. The lobes of the left upper lobe and lower lobe are  rotated with respect to each other. Consider CT with IV contrast to  assess vascular anatomy and patency.    I have personally reviewed the examination and initial interpretation  and I agree with the findings.    JOHANN MORAES MD         SYSTEM ID:  [id number]

## 2022-09-28 NOTE — PROGRESS NOTES
HEMODIALYSIS TREATMENT NOTE    Date: 9/28/2022  Time: 10:27 AM    Data:  Pre Wt: 71.8 kg   Desired Wt: 71.8 kg  Post Wt: 71.8 kg    Weight change: 0 mL  Ultrafiltration - Post Run Net Total Removed (mL): 0 mL  Vascular Access Status: CVC (RTDC) / patent  Dialyzer Rinse: streaked  Total Blood Volume Processed: 67.14 L  Total Dialysis (Treatment) Time: 3 hours  Dialysate Bath: K 3, Ca 2.25  Heparin: None    Lab:   No    Interventions:  2 units of Novolog given for BG of 190    Assessment:  3rd HD for patient with new ESRD. Cystatin C GFR of 10 ml/mn. A&Ox4. Calm and cooperative with cares. Patient reported her pain is under control. Fell asleep shortly after treatment initiation with no complaints for remainder of run. No fluid removed. VSS throughout. Chest tube to gravity. TF @ 40 ml/hr. CVC saline locked and Clear Guard caps changed post-treatment.      Plan:    Per renal.

## 2022-09-28 NOTE — PROGRESS NOTES
D/I- Nausea. Additional dose of IV Zofran ordered. Verbal from Dr. Mujica at bedside to re-start TF slow @ 20 ml/hr and OK to decompress G-tube with gravity bag PRN  A- Improvement in Nausea. Tolerating TF at slow rate. Refused Nystatin  P- Continue feeds at patient tolerates. New order: NPO after midnight 9/29 for NM Gastric Emptying study 9/30

## 2022-09-29 ENCOUNTER — APPOINTMENT (OUTPATIENT)
Dept: OCCUPATIONAL THERAPY | Facility: CLINIC | Age: 62
DRG: 205 | End: 2022-09-29
Attending: INTERNAL MEDICINE
Payer: MEDICARE

## 2022-09-29 ENCOUNTER — APPOINTMENT (OUTPATIENT)
Dept: PHYSICAL THERAPY | Facility: CLINIC | Age: 62
DRG: 205 | End: 2022-09-29
Attending: INTERNAL MEDICINE
Payer: MEDICARE

## 2022-09-29 LAB
ALBUMIN SERPL BCG-MCNC: 3 G/DL (ref 3.5–5.2)
ALP SERPL-CCNC: 87 U/L (ref 35–104)
ALT SERPL W P-5'-P-CCNC: 16 U/L (ref 10–35)
ANION GAP SERPL CALCULATED.3IONS-SCNC: 8 MMOL/L (ref 7–15)
AST SERPL W P-5'-P-CCNC: 21 U/L (ref 10–35)
BASE EXCESS BLDV CALC-SCNC: 6.7 MMOL/L (ref -7.7–1.9)
BILIRUB SERPL-MCNC: 0.2 MG/DL
BUN SERPL-MCNC: 22.9 MG/DL (ref 8–23)
CALCIUM SERPL-MCNC: 8.8 MG/DL (ref 8.8–10.2)
CHLORIDE SERPL-SCNC: 94 MMOL/L (ref 98–107)
CREAT SERPL-MCNC: 1.6 MG/DL (ref 0.51–0.95)
DEPRECATED HCO3 PLAS-SCNC: 31 MMOL/L (ref 22–29)
GFR SERPL CREATININE-BSD FRML MDRD: 37 ML/MIN/1.73M2
GLUCOSE BLDC GLUCOMTR-MCNC: 148 MG/DL (ref 70–99)
GLUCOSE BLDC GLUCOMTR-MCNC: 169 MG/DL (ref 70–99)
GLUCOSE BLDC GLUCOMTR-MCNC: 177 MG/DL (ref 70–99)
GLUCOSE BLDC GLUCOMTR-MCNC: 182 MG/DL (ref 70–99)
GLUCOSE SERPL-MCNC: 129 MG/DL (ref 70–99)
HBV CORE AB SERPL QL IA: NONREACTIVE
HBV SURFACE AB SERPL IA-ACNC: 26.15 M[IU]/ML
HBV SURFACE AB SERPL IA-ACNC: REACTIVE M[IU]/ML
HBV SURFACE AG SERPL QL IA: NONREACTIVE
HCO3 BLDV-SCNC: 34 MMOL/L (ref 21–28)
HIV 1+2 AB+HIV1 P24 AG SERPL QL IA: NONREACTIVE
IGG SERPL-MCNC: 559 MG/DL (ref 610–1616)
O2/TOTAL GAS SETTING VFR VENT: 0 %
PCO2 BLDV: 60 MM HG (ref 40–50)
PH BLDV: 7.36 [PH] (ref 7.32–7.43)
PO2 BLDV: 48 MM HG (ref 25–47)
POTASSIUM SERPL-SCNC: 4.1 MMOL/L (ref 3.4–5.3)
PROT SERPL-MCNC: 5.3 G/DL (ref 6.4–8.3)
SARS-COV-2 RNA RESP QL NAA+PROBE: NEGATIVE
SODIUM SERPL-SCNC: 133 MMOL/L (ref 136–145)
TACROLIMUS BLD-MCNC: 11.5 UG/L (ref 5–15)
TME LAST DOSE: NORMAL H
TME LAST DOSE: NORMAL H

## 2022-09-29 PROCEDURE — 250N000012 HC RX MED GY IP 250 OP 636 PS 637: Performed by: NURSE PRACTITIONER

## 2022-09-29 PROCEDURE — 250N000013 HC RX MED GY IP 250 OP 250 PS 637

## 2022-09-29 PROCEDURE — 97116 GAIT TRAINING THERAPY: CPT | Mod: GP | Performed by: REHABILITATION PRACTITIONER

## 2022-09-29 PROCEDURE — 87340 HEPATITIS B SURFACE AG IA: CPT | Performed by: PEDIATRICS

## 2022-09-29 PROCEDURE — 250N000009 HC RX 250: Performed by: INTERNAL MEDICINE

## 2022-09-29 PROCEDURE — 214N000001 HC R&B CCU UMMC

## 2022-09-29 PROCEDURE — 82784 ASSAY IGA/IGD/IGG/IGM EACH: CPT | Performed by: PHYSICIAN ASSISTANT

## 2022-09-29 PROCEDURE — 99232 SBSQ HOSP IP/OBS MODERATE 35: CPT | Performed by: PEDIATRICS

## 2022-09-29 PROCEDURE — 36415 COLL VENOUS BLD VENIPUNCTURE: CPT | Performed by: PHYSICIAN ASSISTANT

## 2022-09-29 PROCEDURE — 250N000013 HC RX MED GY IP 250 OP 250 PS 637: Performed by: NURSE PRACTITIONER

## 2022-09-29 PROCEDURE — 80053 COMPREHEN METABOLIC PANEL: CPT

## 2022-09-29 PROCEDURE — 80197 ASSAY OF TACROLIMUS: CPT | Performed by: PHYSICIAN ASSISTANT

## 2022-09-29 PROCEDURE — 86803 HEPATITIS C AB TEST: CPT | Performed by: PEDIATRICS

## 2022-09-29 PROCEDURE — 250N000011 HC RX IP 250 OP 636: Performed by: STUDENT IN AN ORGANIZED HEALTH CARE EDUCATION/TRAINING PROGRAM

## 2022-09-29 PROCEDURE — 82803 BLOOD GASES ANY COMBINATION: CPT | Performed by: PHYSICIAN ASSISTANT

## 2022-09-29 PROCEDURE — 250N000013 HC RX MED GY IP 250 OP 250 PS 637: Performed by: INTERNAL MEDICINE

## 2022-09-29 PROCEDURE — 250N000013 HC RX MED GY IP 250 OP 250 PS 637: Performed by: STUDENT IN AN ORGANIZED HEALTH CARE EDUCATION/TRAINING PROGRAM

## 2022-09-29 PROCEDURE — 99233 SBSQ HOSP IP/OBS HIGH 50: CPT | Performed by: PHYSICIAN ASSISTANT

## 2022-09-29 PROCEDURE — 87389 HIV-1 AG W/HIV-1&-2 AB AG IA: CPT | Performed by: PEDIATRICS

## 2022-09-29 PROCEDURE — 97535 SELF CARE MNGMENT TRAINING: CPT | Mod: GO

## 2022-09-29 PROCEDURE — U0005 INFEC AGEN DETEC AMPLI PROBE: HCPCS | Performed by: INTERNAL MEDICINE

## 2022-09-29 PROCEDURE — 86704 HEP B CORE ANTIBODY TOTAL: CPT | Performed by: PEDIATRICS

## 2022-09-29 PROCEDURE — 86706 HEP B SURFACE ANTIBODY: CPT | Performed by: PEDIATRICS

## 2022-09-29 RX ORDER — GUAR GUM
1 PACKET (EA) ORAL 2 TIMES DAILY
Status: DISCONTINUED | OUTPATIENT
Start: 2022-09-29 | End: 2022-10-08 | Stop reason: HOSPADM

## 2022-09-29 RX ORDER — B COMPLEX C NO.10/FOLIC ACID 900MCG/5ML
5 LIQUID (ML) ORAL DAILY
Status: DISCONTINUED | OUTPATIENT
Start: 2022-09-30 | End: 2022-10-08 | Stop reason: HOSPADM

## 2022-09-29 RX ADMIN — ORAL VEHICLES - SUSP 12.5 MG: SUSPENSION at 08:19

## 2022-09-29 RX ADMIN — CALCIUM CARBONATE 600 MG (1,500 MG)-VITAMIN D3 400 UNIT TABLET 1 TABLET: at 08:21

## 2022-09-29 RX ADMIN — TACROLIMUS 4 MG: 5 CAPSULE ORAL at 18:08

## 2022-09-29 RX ADMIN — TACROLIMUS 4.5 MG: 5 CAPSULE ORAL at 09:29

## 2022-09-29 RX ADMIN — NYSTATIN 1000000 UNITS: 100000 SUSPENSION ORAL at 11:32

## 2022-09-29 RX ADMIN — FOLIC ACID 1 MG: 1 TABLET ORAL at 08:21

## 2022-09-29 RX ADMIN — Medication 100 MG: at 08:20

## 2022-09-29 RX ADMIN — INSULIN ASPART 1 UNITS: 100 INJECTION, SOLUTION INTRAVENOUS; SUBCUTANEOUS at 22:14

## 2022-09-29 RX ADMIN — INSULIN ASPART 1 UNITS: 100 INJECTION, SOLUTION INTRAVENOUS; SUBCUTANEOUS at 04:28

## 2022-09-29 RX ADMIN — Medication 40 MG: at 20:21

## 2022-09-29 RX ADMIN — PREDNISOLONE 10 MG: 15 SOLUTION ORAL at 08:20

## 2022-09-29 RX ADMIN — Medication 40 MG: at 08:20

## 2022-09-29 RX ADMIN — INSULIN ASPART 1 UNITS: 100 INJECTION, SOLUTION INTRAVENOUS; SUBCUTANEOUS at 09:45

## 2022-09-29 RX ADMIN — OXYCODONE HYDROCHLORIDE 5 MG: 5 SOLUTION ORAL at 20:36

## 2022-09-29 RX ADMIN — HEPARIN SODIUM 5000 UNITS: 5000 INJECTION, SOLUTION INTRAVENOUS; SUBCUTANEOUS at 20:21

## 2022-09-29 RX ADMIN — CALCIUM CARBONATE 600 MG (1,500 MG)-VITAMIN D3 400 UNIT TABLET 1 TABLET: at 18:00

## 2022-09-29 RX ADMIN — ORAL VEHICLES - SUSP 12.5 MG: SUSPENSION at 20:21

## 2022-09-29 RX ADMIN — LOPERAMIDE HYDROCHLORIDE 2 MG: 2 CAPSULE ORAL at 09:30

## 2022-09-29 RX ADMIN — NYSTATIN 1000000 UNITS: 100000 SUSPENSION ORAL at 18:00

## 2022-09-29 RX ADMIN — HEPARIN SODIUM 5000 UNITS: 5000 INJECTION, SOLUTION INTRAVENOUS; SUBCUTANEOUS at 08:21

## 2022-09-29 RX ADMIN — INSULIN ASPART 1 UNITS: 100 INJECTION, SOLUTION INTRAVENOUS; SUBCUTANEOUS at 17:58

## 2022-09-29 RX ADMIN — MULTIVITAMIN 15 ML: LIQUID ORAL at 08:21

## 2022-09-29 RX ADMIN — GABAPENTIN 200 MG: 250 SOLUTION ORAL at 20:21

## 2022-09-29 RX ADMIN — NYSTATIN 1000000 UNITS: 100000 SUSPENSION ORAL at 20:21

## 2022-09-29 RX ADMIN — PREDNISOLONE 7.5 MG: 15 SOLUTION ORAL at 20:21

## 2022-09-29 RX ADMIN — CYANOCOBALAMIN TAB 500 MCG 500 MCG: 500 TAB at 08:21

## 2022-09-29 ASSESSMENT — ACTIVITIES OF DAILY LIVING (ADL)
ADLS_ACUITY_SCORE: 34

## 2022-09-29 NOTE — PLAN OF CARE
I/A: A/Ox4. VSS on 1L NC. No tele, rhythm regular. Oxy x1 for CVC and back pain. Pigtail CT clamped, dressing changed. Full liquid diet. TFs at goal 40 ml/hr via J tube. Trialed clamping G, pt requested to go back to gravity drainage for intermittent nausea. BG q6h. Adequate UOP. BM x1. SBA. Appeared to sleep comfortably.    P: Possible CT removal today. Gastric emptying study 9/30. Tentative discharge to [facility] on 10/3 with OP HD pending medical readiness. Encourage oral intake, pulm hygiene and ambulation.     Hours of care: [phone]

## 2022-09-29 NOTE — PROGRESS NOTES
Pulmonary Medicine  Cystic Fibrosis - Lung Transplant Team  Progress Note  2022       Patient: Sofie Rodriguez  MRN: [id number]  : 1960 (age 62 year old)  Transplant: 2022 (Lung), POD#93  Admission date: 2022    Assessment & Plan:     Sofie Rodriguez is a 62-year-old female s/p BSLT (22) for COPD complicated by persistent bilateral pleural effusions, persistent CO2 retention, right hemidiaphragm palsy, BACILIO (dialysis -), C. diff colitis, gastroparesis s/p GJ tube placement (22), GI bleed 2/2 pyloric ulcer, hemobilia s/p ERCP and MRCP (), and persistent vasoplegia.  Other history notable for HFpEF, NTM colonoization, hepatitis C, and methamphetamine use.  Patient admitted 22 with persistent dyspnea (increased with activity), daily morning hemoptysis, and increased BLE edema.  Also noted to have persistent, increased bilateral pleural effusions, diffuse bilateral groundglass changes, and more focal appearing LLL infiltrates on imaging.  Treating with aggressive diuresis with some improvement in symptoms and hypoxia.  Hemoptysis resolved, mild intermittent hypoxia and ANAYA persists.  S/p right pigtail chest tube and aspiration of left pleural effusion () with subsequent pigtail chest tube (9/15) with lytic therapy.  Right chest tube removed .  Worsening mentation and tremors noted .  Also, with worsening hypercapnia, treated with NIPPV for several days.  AMS resolved, likely d/t ABX for UTI as elevated BUN has not changed, additional workup unremarkable.  Tunneled line placed and HD initiated .  Left chest tube clamped .       Today's recommendations:  - Sputum culture ordered if able to collect  - Exercise and overnight oximetry study 24-48h prior to discharge  - Defer CXR today per Dr. Gong given recent chest CT without contrast, plan to remove left chest tube today, follow up CXR ordered tomorrow  - Defer chest CTA per discussion at lung transplant  committee meeting (with surgeons) this morning  - RN CC to schedule pulmonary follow up 1-2 weeks from discharge  - Tacrolimus level pending, will adjust dose if indicated, repeat level currently ordered 10/3  - Prednisone taper due 10/13 (not yet ordered)  - CMV (9/28) pending with plan for every other week monitoring  - CMV, DSA, and EBV ordered 10/5  - IgG (9/29) pending  - Revisit volume removal  - G tube to gravity drainage with GI symptoms (N/V, bloating, reflux)  - Repeat gastric emptying study to evaluate for ability to transition medications to PO and progress diet, scheduled tomorrow although will revisit pending nausea  - Repeat EGD 10/13 to check healing of ulcer, sooner if hgb declines further     S/p bilateral sequential lung transplant (BSLT) for end stage COPD:  Acute hypoxia with chronic hypercapnia:   Pulmonary edema:  Persistent bibasilar pleural effusions:   Right hemidiaphragm palsy: Admitted with increased dyspnea with minimal exertion and small volume daily hemoptysis.  Also with marked decline in PFTs (FEV1 1.22L, 50% on 8/18 to 0.98L, 40% on 9/7).  Chest CT (9/8) with increased effusions and groundglass changes.  DSA positive but stable (as below).  BNP markedly elevated (30k) and also with increased BLE edema.  Etiology unclear and is likely multi-factorial with DDx including pulmonary edema from hypervolemia/progressive HFpEF, rejection (ACR +/- AMR), alveolar hemorrhage, and/or infection.  Aggressively diuresed with some improvement in symptoms.  Bronch (9/13) unremarkable, BAL of lingula sent, cultures no growth (GPC on gram stain only).  S/p right pigtail chest tube placement (9/13-9/22), transudative with moderate output initially but quickly decreasing, cultures negative.  S/p aspiration of left pleural effusion (9/13) and then pigtail chest tube (9/15) s/p full lytic course (916-9/19), cultures also negative, clamped 9/26.  Chest CT (9/21) with decreased pleural effusions with  continued prominent loculated component of right effusion and overall slight decrease in associated atelectasis.  Chest CT (9/27) with grossly unchanged small bilateral hydroPTX with left chest tube coiled in inferior medial left pleural space, continued resolution of nodular opacity in posterior MARLON, and the lobes of the left upper and lower are rotated with respect to each other.  Still with some ANAYA but no further hemoptysis since ~9/12.  Placed on continuous BiPAP 9/19-9/22 due to worsening hypercapnia and mentation, since improved.  Unable to collect sputum sample, respiratory ABX coverage deferred.  Weaned to RA at rest on 9/24, using 1L NC overnight.  VBG with improved hypercapnia 9/29.  - Sputum culture ordered if able to collect  - Supplemental O2 to keep >92%, exercise and overnight oximetry study 24-48h prior to discharge  - Continue to defer NIPPV, repeat VBG prn with change in mentation/clinical status  - IS and Aerobika q1h w/a and encourage OOB during the day as much as able  - Left chest tube clamped (9/26), air leak noted 9/23, defer CXR today per Dr. Gong given chest CT without contrast 9/27, plan to remove left chest tube 9/29 with follow up CXR 9/30 (ordered)  - Discussed findings of rotated left upper and lower lobes at lung transplant committee meeting 9/29, surgeons aware and no concerns/further evaluation needed at this time  - RN CC to schedule pulmonary follow up 1-2 weeks from discharge     Immunosuppression:  - Tacrolimus 4.5 mg qAM / 4 mg qPM (increased 9/19).  Goal level 8-12.  Level 9/29 pending, will adjust dose if indicated and tentatively repeat level 10/3 (ordered).  - Azathioprine 100 mg daily (9/20, MMF stopped due to ongoing diarrhea)  - Prednisone 10 mg qAM / 7.5 mg qPM with next taper due 10/13 (not yet ordered), defer accelerated taper (with elevated BUN) at this time  Date AM dose (mg) PM dose (mg)   9/15/22 10 7.5   10/13/22 7.5 7.5   11/10/22 7.5 5   12/8/22 5 5   1/5/23  5 2.5      Prophylaxis:   - Dapsone qMWF for PJP ppx (resumed 9/19, monitor for worsening anemia; Bactrim stopped d/t hyperkalemia, will need to monitor for recurrence of anemia with dapsone; Pentamidine neb not tolerated on 9/7 after only 5 minutes d/t excessive coughing)  - Completed VGCV for CMV ppx 9/26 (through POD#90), D+/R+, CMV (9/28) pending with monitoring every other week (starting 10/5, ordered)      Positive DSA: Newly positive on 8/10 with DQB2 mfi 2155.   - Monitoring [id number], next due 10/5 (ordered), see below for trend:  Date DQB2 Notes   8/10 2155     9/1 7033 Current peak   9/21 5390 Most recent       EBV viremia: Low level (1374 on 9/7), not likely to be clinically significant.  - Follow EBV monthly (10/5, ordered)     Hypogammaglobulinemia: IgG adequate at time of transplant, repeat level low (336) on 7/28.  S/p IVIG 7/30, tolerated well.  IgG on 8/29 adequate at 672.   - Follow IgG monthly (9/29, pending)     Other relevant problems being managed by the primary team:     Encephalopathy, Resolved:  Tremors:   Presumed UTI: Noted 9/19 with confusion as well as tremor.  DDx including hypercapnia, hyperammonia, infection, uremia (see below), medication related.  VBG with worsening hypercapnia (99).  BUN elevated (112).  Ammonia normal (24).  CRP normal, procal 0.1.  UA with moderate blood, large leukocyte esterase, 3 RBC, 29 WBC, and many bacteria.  Urine culture (9/19) with mixture of urogenital josue.  Tacrolimus levels recently were subtherapeutic.  Head CT (9/20) without acute intracranial pathology.  Blood culture (9/19) NGTD.  AMS resolved ~9/22.  S/p ceftriaxone 5-day course (9/20-9/24).     ESRD based upon Cystatin C (GFR of 10): Nephrology consulted 9/21, see their note for details, with concern for CKD5 as Cr may suggest overestimation of kidney function with limited muscle mass, unclear if trend over the past week reflects BACILIO.  Cystatin C 4.3 with GFR 10.  Making urine.  Renal US (9/22)  normal.  HD trial discussed with pt. and daughter on 9/22, tunneled line placed and HD initiated 9/26.  - Management per nephrology, revisit volume removal     Diarrhea: C diff negative on 9/10 and 9/20.  Likely medication related (MMF), but unable to transition to Myfortic given NPO.  Loperamide utilized with some benefit.  Goal to titrate to antidiarrheal 3 stools daily, management per primary.  Enteric stool panel negative 9/16.  Transitioned from MMF to AZA as above.      Severe gastroparesis:   Pyloric ulcer: Gastric emptying study 7/20 with severe gastric emptying delay (95% retention at 4 hours), s/p GJ tube placement in IR 7/27.  S/p EGD (8/3) noted to have pyloric ulcer and excessive gastric fluid and residual food.  S/p EGD (8/11) with mild erythema 2/2 GJ tube trauma seen near insertion site but no active bleeding.  - PPI BID  - TF continuous and ALL enteral medications via J tube  - G tube to gravity drainage prn with GI symptoms (N/V, bloating, reflux); full liquid diet for comfort only  - Repeat gastric emptying study to evaluate for ability to transition medications to PO and progress diet, scheduled 9/30 although will revisit pending nausea  - Repeat EGD 10/13 to check healing of ulcer, sooner if hgb declines further    We appreciate the excellent care provided by the [place] [id number] team.  Recommendations communicated via in person rounding and this note.  Will continue to follow along closely, please do not hesitate to call with any questions or concerns.    Patient discussed with Dr. Gong.    Yuliet Aviles PA-C  [facility]patient [facility]  Pulmonary CF/Transplant     Subjective & Interval History:     RA during the day, walked this morning on RA and SpO2 % following walk.  Using 0.5-1.5L NC overnight and required when first getting up this morning.  Denies change in cough or sputum.  Left chest tube remains clamped.  Persistent nausea yesterday following dialysis.  G tube to gravity, received Zofran,  "and TF decreased with some improvement.  Minimal PO intake.  Stools slightly more formed.  Reports decreased urine output.  Ongoing BLE swelling, wraps replaced yesterday.        Review of Systems:     C: No fever, no chills, + increased weight  INTEGUMENTARY/SKIN: No rash or obvious new lesions  ENT/MOUTH: No sore throat, no sinus pain, no nasal congestion or drainage  RESP: See interval history  CV: No chest pain, no palpitations  GI: No vomiting, no reflux  : No dysuria  MUSCULOSKELETAL: + incisional pain  ENDOCRINE: Blood sugars with adequate control  NEURO: + intermittent headache, no numbness or tingling  PSYCHIATRIC: Mood stable    Physical Exam:     All notes, images, and labs from past 24 hours (at minimum) were reviewed.    Vital signs:  Temp: 98.8  F (37.1  C) Temp src: Oral BP: (!) 144/85 Pulse: 102   Resp: 16 SpO2: 94 % O2 Device: Nasal cannula Oxygen Delivery: 1 LPM Height: 158.8 cm (5' 2.5\") Weight: 71.8 kg (158 lb 4.8 oz)  I/O:     Intake/Output Summary (Last 24 hours) at 9/29/2022 0720  Last data filed at 9/29/2022 0600  Gross per 24 hour   Intake 920 ml   Output 550 ml   Net 370 ml     Constitutional: Sitting up in chair, in no apparent distress.   HEENT: Eyes with pink conjunctivae, anicteric.  Oral mucosa moist without lesions.   PULM: Diminished air flow to bases bilaterally.  Minimal crackles.  No rhonchi, no wheezes.  Non-labored breathing on RA.  Left chest tube clamped.  CV: Normal S1 and S2.  RRR.  + systolic murmur.  No gallop or rub.  2+ BLE edema (wrapped).   ABD: NABS, soft, nontender, nondistended.  PEG/J tube not visualized.   MSK: Moves all extremities.  No apparent muscle wasting.   NEURO: Alert, conversant.   SKIN: Warm, dry.  No rash on limited exam.   PSYCH: Mood stable.     Lines, Drains, and Devices:  Peripheral IV 09/10/22 Anterior;Left Lower forearm (Active)   Site Assessment WDL 09/28/22 2000   Line Status Saline locked 09/28/22 2000   Dressing Intervention New dressing  " 09/10/22 0135   Phlebitis Scale 0-->no symptoms 09/28/22 2000   Infiltration Scale 0 09/28/22 2000   Number of days: 19       CVC Double Lumen Right Internal jugular Tunneled (Active)   Site Assessment WDL 09/28/22 2000   Dressing Type Chlorhexidine disk;Gauze;Transparent 09/28/22 1153   Dressing Status dry;intact 09/28/22 1153   Dressing Change Due 10/03/22 09/28/22 1153   Line Necessity yes, meets criteria 09/28/22 1700   Blue - Status saline locked;cap changed 09/28/22 1153   Blue - Cap Change Due 10/05/22 09/28/22 1153   Red - Status saline locked;cap changed 09/28/22 1153   Red - Cap Change Due 10/05/22 09/28/22 1153   Number of days: 3     Data:     LABS    CMP:   Recent Labs   Lab 09/29/22  0603 09/29/22  0427 09/28/22  2221 09/28/22  1616 09/28/22  0809 09/28/22  0514 09/27/22  0950 09/27/22  0511 09/26/22  0913 09/26/22  0555 09/25/22  0933 09/25/22  0736   *  --   --   --   --  133*  --  135*  --  137  --   --    POTASSIUM 4.1  --   --   --   --  4.2  --  4.1  --  4.9  --   --    CHLORIDE 94*  --   --   --   --  95*  --  91*  --  87*  --   --    CO2 31*  --   --   --   --  32*  --  39*  --  44*  --   --    ANIONGAP 8  --   --   --   --  6*  --  5*  --  6*  --   --    * 148* 178* 137*   < > 143*   < > 173*   < > 148*   < >  --    BUN 22.9  --   --   --   --  39.2*  --  63.4*  --  96.7*  --   --    CR 1.60*  --   --   --   --  1.44*  --  1.48*  --  1.72*  --   --    GFRESTIMATED 37*  --   --   --   --  41*  --  40*  --  33*  --   --    ESTUARDO 8.8  --   --   --   --  8.9  --  9.0  --  9.4  --   --    MAG  --   --   --   --   --  2.0  --  2.3  --  2.7*  --  2.7*   PHOS  --   --   --   --   --  3.2  --  3.5  --  3.7  --   --    PROTTOTAL 5.3*  --   --   --   --  5.1*  --  5.2*  --  5.4*  --   --    ALBUMIN 3.0*  --   --   --   --  2.9*  --  3.1*  --  3.1*  --   --    BILITOTAL 0.2  --   --   --   --  0.2  --  0.2  --  0.2  --   --    ALKPHOS 87  --   --   --   --  86  --  85  --  79  --   --    AST 21   --   --   --   --  20  --  21  --  25  --   --    ALT 16  --   --   --   --  13  --  12  --  11  --   --     < > = values in this interval not displayed.     CBC:   Recent Labs   Lab 09/28/22  0514 09/27/22  0511 09/26/22  0555 09/24/22  0615   WBC 10.5 8.4 8.5 7.1   RBC 2.65* 2.51* 2.65* 2.55*   HGB 8.4* 7.9* 8.2* 7.9*   HCT 27.0* 26.3* 27.4* 26.9*   * 105* 103* 106*   MCH 31.7 31.5 30.9 31.0   MCHC 31.1* 30.0* 29.9* 29.4*   RDW 19.8* 19.9* 19.9* 19.6*    217 224 170       INR: No lab results found in last 7 days.    Glucose:   Recent Labs   Lab 09/29/22  0603 09/29/22  0427 09/28/22  2221 09/28/22  1616 09/28/22  1008 09/28/22  0809   * 148* 178* 137* 190* 133*       Blood Gas:   Recent Labs   Lab 09/29/22  0603   PHV 7.36   PCO2V 60*   PO2V 48*   HCO3V 34*   LINDY 6.7*   O2PER 0       Culture Data No results for input(s): CULT in the last 168 hours.    Virology Data:   Lab Results   Component Value Date    FLUAH1 Not Detected 09/13/2022    FLUAH3 Not Detected 09/13/2022    BA1004 Not Detected 09/13/2022    IFLUB Not Detected 09/13/2022    RSVA Not Detected 09/13/2022    RSVB Not Detected 09/13/2022    PIV1 Not Detected 09/13/2022    PIV2 Not Detected 09/13/2022    PIV3 Not Detected 09/13/2022    HMPV Not Detected 09/13/2022       Historical CMV results (last 3 of prior testing):  Lab Results   Component Value Date    CMVQNT Not Detected 09/13/2022    CMVQNT Not Detected 09/07/2022    CMVQNT Not Detected 09/01/2022     No results found for: CMVLOG    Urine Studies    Recent Labs   Lab Test 09/19/22  2254 08/01/22  0319 07/08/22  0831   URINEPH 7.0 8.0* 5.5   NITRITE Negative Negative Negative   LEUKEST Large* Negative Negative   WBCU 29*  --  1       Most Recent [place] Pulmonary Function Testing (FVC/FEV1 only)  FVC-Pre   Date Value Ref Range Status   09/07/2022 1.01 L    09/01/2022 1.07 L    08/24/2022 1.23 L    08/18/2022 1.33 L      FVC-%Pred-Pre   Date Value Ref Range Status   09/07/2022 33  %    09/01/2022 35 %    08/24/2022 40 %    08/18/2022 43 %      FEV1-Pre   Date Value Ref Range Status   09/07/2022 0.98 L    09/01/2022 1.02 L    08/24/2022 1.17 L    08/18/2022 1.22 L      FEV1-%Pred-Pre   Date Value Ref Range Status   09/07/2022 40 %    09/01/2022 42 %    08/24/2022 48 %    08/18/2022 50 %        IMAGING    Recent Results (from the past 48 hour(s))   XR Chest 2 Views    Narrative    EXAM: XR CHEST 2 VIEWS  9/27/2022 1:55 PM     HISTORY:  chest tube - Clamped, assessing Ptx.       COMPARISON:  Chest x-ray 9/26/2022    FINDINGS:   PA and views of the chest. Post surgical changes of bilateral lung  transplant with sternotomy wires. Right IJ central venous catheter tip  projects over the superior cavoatrial junction. Grossly stable  position of left chest pigtail drain. Stable small left pleural  effusion. Small amount of fluid tracking in the right minor fissure.  No appreciable pneumothorax.      Impression    IMPRESSION:   1. No appreciable pneumothorax following clamping of the left chest  tube.  2. Stable small left pleural effusion.    I have personally reviewed the examination and initial interpretation  and I agree with the findings.    GEORGE ROGERS MD         SYSTEM ID:  [id number]   CT Chest w/o Contrast    Narrative    EXAMINATION: Chest CT  9/27/2022 4:13 PM    CLINICAL HISTORY: chest tube and air leak    COMPARISON: CT chest 9/21/2022.    TECHNIQUE: CT imaging obtained through the chest without contrast.  Axial, coronal, and sagittal reconstructions and axial MIP reformatted  images are reviewed.     CONTRAST: None    FINDINGS:  Support devices: Right IJ central venous catheter tip terminates in  the right atrium. Left basilar chest tube is coiled in the inferior  medial left pleural space.    Lungs: Postsurgical changes of bilateral lung transplant. No  significant narrowing on either bronchial anastomosis. Mild adherent  debris in the upper trachea. The left upper lobe and left  lower lobe  are rotated with respect to each other with displacement of the  fissure. The airways otherwise patent. No significant change in the  small bilateral hydropneumothoraces. Small amount of fluid tracking in  the right oblique fissure. Continued resolution of the nodular opacity  in the posterior portion of the left upper lobe, significantly  decreased in size since the 9/8/2022 chest CT. No new airspace  consolidation. Mild interstitial thickening in the lung bases.    Mediastinum: The thyroid is unremarkable. Cardiac size is normal.  Normal caliber aorta and main pulmonary artery. No significant  pericardial effusion. Moderate coronary artery calcium. No thoracic  lymphadenopathy. Esophagus is normal in caliber.    Bones and soft tissues: No suspicious bone findings. Intact clam shell  sternotomy wires.    Upper Abdomen: Limited evaluation of the upper abdomen. Central  pneumobilia.      Impression    IMPRESSION:   1. Grossly unchanged small bilateral hydropneumothoraces with left  chest tube coiled in the inferior medial left pleural space.  2. Continued resolution of the nodular opacity in the posterior left  upper lung. The lobes of the left upper lobe and lower lobe are  rotated with respect to each other. Consider CT with IV contrast to  assess vascular anatomy and patency.    I have personally reviewed the examination and initial interpretation  and I agree with the findings.    JOHANN MORAES MD         SYSTEM ID:  [id number]

## 2022-09-29 NOTE — PLAN OF CARE
Pt admitted 9/9 txp pulm w/up for rejection vs infection with ESRD.  Consecutive days of HD and got today off.  Another dialysis run tomorrow at 840.  TF ran continuous today and hook G tube up to gravity for two hours post medications.  No tele orders, VS'S on RA (1 L NC HS) and denied pain.  NPO at midnight for gastric emptying study.  CXR ordered for am and contacted team to get CT removed this afternoon.  Pt tearful this am regarding above medical issues.  She did try Zofran yesterday and did help. Pt dose not have much ordered for nausea and several antiemetics I looked at have no nephro side effects.  Continue to monitor and with POC.

## 2022-09-29 NOTE — PLAN OF CARE
Goal Outcome Evaluation:    Plan of Care Reviewed With: patient     Overall Patient Progress: no change    Outcome Evaluation: Total avg nutritional intake to meet a minimum of 25 kcal/kg and 1.2 g PRO/kg daily (per dosing wt 56 kg). Nutrisource Fiber 1 pkt BID to help with stooling.

## 2022-09-29 NOTE — PROGRESS NOTES
CLINICAL NUTRITION SERVICES - REASSESSMENT NOTE     Nutrition Prescription    RECOMMENDATIONS FOR MDs/PROVIDERS TO ORDER:  - Adjust free water flushes via feeding tube as needed, pending fluid status.  - Dilute suspension medication, as able, to possibly help further decrease stooling. Continue to administer Imodium as needed.     Malnutrition Status:    Patient does not meet two of the established criteria necessary for diagnosing malnutrition but is at risk for malnutrition    Recommendations already ordered by Registered Dietitian (RD):  - Decrease Nepro to once daily, pt may order additional PRN  - Switch multivitamin to renal vitamin  - Switch Banatrol to Nutrisource Fiber 1 pkt BID    Future/Additional Recommendations:  - Monitor stooling trends and need to adjust Nutrisource Fiber  - Continue current TF regimen  - Monitor diet advancement     EVALUATION OF THE PROGRESS TOWARD GOALS   Diet: Full Liquid  Supplements: Nepro BID between meals  PO intake: minimal PO intake per flowsheets. Per [employer], pt not ordering meals from room service.    Nutrition Support:   - Feeding Tube (FT) access: S/p GJ tube placement on 7/27/22. J-tube for TFs. G tube to gravity.    - TF regimen:   9/10-9/14: Novasource Renal @ 35 ml/hr (840 ml) + 1 pkt Prosource provides 1720 kcal (30 kcal/kg), 87 g pro (1.5 gm Pro/kg), 154 g CHO, 602 ml free water, and 0 g fiber daily.     9/14-9/24: Nepro @ goal 40 ml/hr (960 ml/day) to provide 1728 kcals (30 kcal/kg/day), 78 g PRO (1.4 g/kg/day), 701 ml free H2O, 155 g CHO and 24 g Fiber daily.     9/24-current: Nepro @ goal 40 ml/hr (960 ml/day) + 2 pkt Banatrol to provide 1808 kcals (32 kcal/kg/day), 78 g PRO (1.4 g/kg/day), 701 ml free H2O, 175 g CHO and 28 g Fiber daily.     - Feeding Tube Flushes: 30 mL Q 4 hrs    TF Intake: 7-day average TF intake was 661 mL and 1.3 pkts Banatrol/day, which provided 1242 kcal (22 kcal/kg) and 53 gm protein (0.9 g/kg). This meets 88% of low-end estimated  energy needs and 94% of low-end estimated protein needs.     NEW FINDINGS   - G tube to gravity drainage prn with GI symptoms (N/V, bloating, reflux); full liquid diet for comfort only  - Repeat gastric emptying study to evaluate for ability to transition medications to PO and progress diet, scheduled 9/30 although will revisit pending nausea  - Pt is drinking Nepro, but would like only 1/day.  - Per pt, stooling was getting better but is now worse again. She thinks Banatrol is making her nauseous and she doesn't like how much water is mixed with it. She is interested in trying Nutrisource fiber.    ASSESSED NUTRITION NEEDS (updated with starting HD)  Estimated Energy Needs: 1994-1913+ kcals/day (25-30+ kcals/kg)   Justification: Maintenance needs, rec high end of range   Estimated Protein Needs: 67-84 grams protein/day (1.2-1.5 grams of pro/kg)   Justification: Maintenance needs   Estimated Fluid Needs: 3490-4003 mL/day (25 - 30 mL/kg)   Justification: Maintenance needs or per provider pending fluid status     Weight:   09/29/22 0440 71.8 kg (158 lb 4.8 oz)   09/28/22 0500 71.8 kg (158 lb 4.8 oz)   09/27/22 0800 70.5 kg (155 lb 6.4 oz)   09/26/22 0425 70.6 kg (155 lb 9.6 oz)   09/25/22 0500 70.4 kg (155 lb 1.6 oz)   09/24/22 0400 69.6 kg (153 lb 6.4 oz)   09/23/22 0400 69 kg (152 lb 1.6 oz)   09/19/22 0500 68.2 kg (150 lb 6.4 oz)   09/14/22 0617 71 kg (156 lb 8.4 oz)   09/09/22 2105 73.4 kg (161 lb 12.8 oz)   Wt down 1.6 kg since admit (pt has received lasix and bumex this admission)  Wt trending up over past week.    Labs:    Cr: 1.60 (H)  Glucose: 129-190  Vitamin D: 25 (WNL) on 9/26    Meds:  Banatrol, 1 pkt BID  Caltrate  Vitamin B12  Folic acid  Novolog  MVI  Protonix, BID  Prednisolone  Imodium, PRN TID    GI:  Per I/Os, pt having 1-6 stools/day over past week. Imodium PRN - has been given 1-2 times/day for past week.    Renal:   iHD x3 completed, per renal planning for MWF HD sessions, next due  Friday.    MALNUTRITION  % Intake: Decreased intake does not meet criteria, on TF  % Weight Loss: None noted, but difficult to assess with fluid status  Subcutaneous Fat Loss: None observed  Muscle Loss: None observed  Fluid Accumulation/Edema: Moderate  Malnutrition Diagnosis: Patient does not meet two of the established criteria necessary for diagnosing malnutrition but is at risk for malnutrition    Previous Goals   Total avg nutritional intake to meet a minimum of 25 kcal/kg and 1 g PRO/kg daily (per dosing wt 56 kg).  Evaluation: Not met    Previous Nutrition Diagnosis  Inadequate energy intake related to less than goal TF received as evidenced by pt received a seven-day TF average which provided 1244 kcals and does not pt's estimated needs of 8162-8192+ kcals/day (25-30+ kcals/kg).   Evaluation: Modified    CURRENT NUTRITION DIAGNOSIS  Inadequate energy intake related to less than goal TF received as evidenced by pt received a seven-day TF average which provided 1242 kcals and 53 gm protein, which does not pt's estimated needs of 5557-5585+ kcals/day (25-30+ kcals/kg) and 67-84 grams protein/day (1.2-1.5 grams of pro/kg).     INTERVENTIONS  Implementation  Enteral Nutrition - change Banatrol to Nutrisource Fiber  Medical food supplement therapy - Nepro once daily, additional PRN  Multivitamin/mineral supplement therapy - switch to renal vitamin    Goals  Total avg nutritional intake to meet a minimum of 25 kcal/kg and 1.2 g PRO/kg daily (per dosing wt 56 kg).    Monitoring/Evaluation  Progress toward goals will be monitored and evaluated per protocol.    Rika Martinez RD, MAGDA  6C RD pager: [phone]  Weekend/Holiday RD pager: [phone]

## 2022-09-29 NOTE — PROGRESS NOTES
D/CT was removed. She continues on TF, and meds per tube.  A/see how she does without CT  P/monitor for changes

## 2022-09-30 ENCOUNTER — APPOINTMENT (OUTPATIENT)
Dept: NUCLEAR MEDICINE | Facility: CLINIC | Age: 62
DRG: 205 | End: 2022-09-30
Attending: PEDIATRICS
Payer: MEDICARE

## 2022-09-30 ENCOUNTER — APPOINTMENT (OUTPATIENT)
Dept: GENERAL RADIOLOGY | Facility: CLINIC | Age: 62
DRG: 205 | End: 2022-09-30
Attending: PHYSICIAN ASSISTANT
Payer: MEDICARE

## 2022-09-30 LAB
ALBUMIN SERPL BCG-MCNC: 3 G/DL (ref 3.5–5.2)
ALP SERPL-CCNC: 71 U/L (ref 35–104)
ALT SERPL W P-5'-P-CCNC: 12 U/L (ref 10–35)
ANION GAP SERPL CALCULATED.3IONS-SCNC: 8 MMOL/L (ref 7–15)
AST SERPL W P-5'-P-CCNC: 19 U/L (ref 10–35)
BILIRUB SERPL-MCNC: 0.2 MG/DL
BUN SERPL-MCNC: 39 MG/DL (ref 8–23)
CALCIUM SERPL-MCNC: 9.1 MG/DL (ref 8.8–10.2)
CHLORIDE SERPL-SCNC: 95 MMOL/L (ref 98–107)
CMV DNA SPEC NAA+PROBE-ACNC: NOT DETECTED IU/ML
CREAT SERPL-MCNC: 2.09 MG/DL (ref 0.51–0.95)
DEPRECATED HCO3 PLAS-SCNC: 31 MMOL/L (ref 22–29)
GAMMA INTERFERON BACKGROUND BLD IA-ACNC: 0.04 IU/ML
GFR SERPL CREATININE-BSD FRML MDRD: 26 ML/MIN/1.73M2
GLUCOSE BLDC GLUCOMTR-MCNC: 144 MG/DL (ref 70–99)
GLUCOSE BLDC GLUCOMTR-MCNC: 148 MG/DL (ref 70–99)
GLUCOSE BLDC GLUCOMTR-MCNC: 168 MG/DL (ref 70–99)
GLUCOSE BLDC GLUCOMTR-MCNC: 176 MG/DL (ref 70–99)
GLUCOSE SERPL-MCNC: 127 MG/DL (ref 70–99)
HCV AB SERPL QL IA: REACTIVE
M TB IFN-G BLD-IMP: NEGATIVE
M TB IFN-G CD4+ BCKGRND COR BLD-ACNC: 4.4 IU/ML
MITOGEN IGNF BCKGRD COR BLD-ACNC: -0.01 IU/ML
MITOGEN IGNF BCKGRD COR BLD-ACNC: 0 IU/ML
POTASSIUM SERPL-SCNC: 4.4 MMOL/L (ref 3.4–5.3)
PROT SERPL-MCNC: 5.1 G/DL (ref 6.4–8.3)
QUANTIFERON MITOGEN: 4.44 IU/ML
QUANTIFERON NIL TUBE: 0.04 IU/ML
QUANTIFERON TB1 TUBE: 0.04 IU/ML
QUANTIFERON TB2 TUBE: 0.03
SODIUM SERPL-SCNC: 134 MMOL/L (ref 136–145)

## 2022-09-30 PROCEDURE — 258N000003 HC RX IP 258 OP 636

## 2022-09-30 PROCEDURE — 99232 SBSQ HOSP IP/OBS MODERATE 35: CPT | Performed by: PEDIATRICS

## 2022-09-30 PROCEDURE — 214N000001 HC R&B CCU UMMC

## 2022-09-30 PROCEDURE — 250N000009 HC RX 250: Performed by: NURSE PRACTITIONER

## 2022-09-30 PROCEDURE — 250N000013 HC RX MED GY IP 250 OP 250 PS 637: Performed by: STUDENT IN AN ORGANIZED HEALTH CARE EDUCATION/TRAINING PROGRAM

## 2022-09-30 PROCEDURE — 250N000013 HC RX MED GY IP 250 OP 250 PS 637: Performed by: INTERNAL MEDICINE

## 2022-09-30 PROCEDURE — 634N000001 HC RX 634

## 2022-09-30 PROCEDURE — 90937 HEMODIALYSIS REPEATED EVAL: CPT

## 2022-09-30 PROCEDURE — 250N000013 HC RX MED GY IP 250 OP 250 PS 637: Performed by: NURSE PRACTITIONER

## 2022-09-30 PROCEDURE — 99233 SBSQ HOSP IP/OBS HIGH 50: CPT | Performed by: NURSE PRACTITIONER

## 2022-09-30 PROCEDURE — 71046 X-RAY EXAM CHEST 2 VIEWS: CPT | Mod: 26 | Performed by: RADIOLOGY

## 2022-09-30 PROCEDURE — 78264 GASTRIC EMPTYING IMG STUDY: CPT | Mod: 26

## 2022-09-30 PROCEDURE — 250N000009 HC RX 250: Performed by: INTERNAL MEDICINE

## 2022-09-30 PROCEDURE — 80053 COMPREHEN METABOLIC PANEL: CPT

## 2022-09-30 PROCEDURE — 36415 COLL VENOUS BLD VENIPUNCTURE: CPT | Performed by: INTERNAL MEDICINE

## 2022-09-30 PROCEDURE — A9541 TC99M SULFUR COLLOID: HCPCS | Performed by: PEDIATRICS

## 2022-09-30 PROCEDURE — 250N000011 HC RX IP 250 OP 636: Performed by: STUDENT IN AN ORGANIZED HEALTH CARE EDUCATION/TRAINING PROGRAM

## 2022-09-30 PROCEDURE — 250N000013 HC RX MED GY IP 250 OP 250 PS 637

## 2022-09-30 PROCEDURE — 250N000012 HC RX MED GY IP 250 OP 636 PS 637: Performed by: NURSE PRACTITIONER

## 2022-09-30 PROCEDURE — G1010 CDSM STANSON: HCPCS

## 2022-09-30 PROCEDURE — 78264 GASTRIC EMPTYING IMG STUDY: CPT | Mod: MG

## 2022-09-30 PROCEDURE — 71046 X-RAY EXAM CHEST 2 VIEWS: CPT

## 2022-09-30 PROCEDURE — 343N000001 HC RX 343: Performed by: PEDIATRICS

## 2022-09-30 RX ORDER — PROCHLORPERAZINE 25 MG
25 SUPPOSITORY, RECTAL RECTAL EVERY 12 HOURS PRN
Status: DISCONTINUED | OUTPATIENT
Start: 2022-09-30 | End: 2022-10-08 | Stop reason: HOSPADM

## 2022-09-30 RX ORDER — PROCHLORPERAZINE MALEATE 5 MG
5 TABLET ORAL EVERY 6 HOURS PRN
Status: DISCONTINUED | OUTPATIENT
Start: 2022-09-30 | End: 2022-10-08 | Stop reason: HOSPADM

## 2022-09-30 RX ADMIN — Medication 2.3 MILLICURIE: at 07:31

## 2022-09-30 RX ADMIN — DAPSONE 50 MG: 100 TABLET ORAL at 10:00

## 2022-09-30 RX ADMIN — PREDNISOLONE 10 MG: 15 SOLUTION ORAL at 09:59

## 2022-09-30 RX ADMIN — INSULIN ASPART 1 UNITS: 100 INJECTION, SOLUTION INTRAVENOUS; SUBCUTANEOUS at 21:41

## 2022-09-30 RX ADMIN — FOLIC ACID 1 MG: 1 TABLET ORAL at 10:00

## 2022-09-30 RX ADMIN — NYSTATIN 1000000 UNITS: 100000 SUSPENSION ORAL at 10:02

## 2022-09-30 RX ADMIN — HEPARIN SODIUM 5000 UNITS: 5000 INJECTION, SOLUTION INTRAVENOUS; SUBCUTANEOUS at 10:22

## 2022-09-30 RX ADMIN — OXYCODONE HYDROCHLORIDE 5 MG: 5 SOLUTION ORAL at 19:34

## 2022-09-30 RX ADMIN — Medication 40 MG: at 19:34

## 2022-09-30 RX ADMIN — ORAL VEHICLES - SUSP 12.5 MG: SUSPENSION at 19:34

## 2022-09-30 RX ADMIN — Medication 1 PACKET: at 16:30

## 2022-09-30 RX ADMIN — SODIUM CHLORIDE 250 ML: 9 INJECTION, SOLUTION INTRAVENOUS at 13:16

## 2022-09-30 RX ADMIN — PREDNISOLONE 7.5 MG: 15 SOLUTION ORAL at 19:34

## 2022-09-30 RX ADMIN — LOPERAMIDE HYDROCHLORIDE 2 MG: 2 CAPSULE ORAL at 12:44

## 2022-09-30 RX ADMIN — INSULIN ASPART 1 UNITS: 100 INJECTION, SOLUTION INTRAVENOUS; SUBCUTANEOUS at 17:06

## 2022-09-30 RX ADMIN — Medication 100 MG: at 09:59

## 2022-09-30 RX ADMIN — NYSTATIN 1000000 UNITS: 100000 SUSPENSION ORAL at 19:34

## 2022-09-30 RX ADMIN — EPOETIN ALFA-EPBX 5000 UNITS: 10000 INJECTION, SOLUTION INTRAVENOUS; SUBCUTANEOUS at 13:16

## 2022-09-30 RX ADMIN — Medication: at 13:16

## 2022-09-30 RX ADMIN — TACROLIMUS 4.5 MG: 5 CAPSULE ORAL at 09:32

## 2022-09-30 RX ADMIN — HEPARIN SODIUM 5000 UNITS: 5000 INJECTION, SOLUTION INTRAVENOUS; SUBCUTANEOUS at 19:34

## 2022-09-30 RX ADMIN — OXYCODONE HYDROCHLORIDE 5 MG: 5 SOLUTION ORAL at 08:03

## 2022-09-30 RX ADMIN — CALCIUM CARBONATE 600 MG (1,500 MG)-VITAMIN D3 400 UNIT TABLET 1 TABLET: at 17:07

## 2022-09-30 RX ADMIN — CYANOCOBALAMIN TAB 500 MCG 500 MCG: 500 TAB at 10:00

## 2022-09-30 RX ADMIN — Medication 40 MG: at 09:59

## 2022-09-30 RX ADMIN — SODIUM CHLORIDE 300 ML: 9 INJECTION, SOLUTION INTRAVENOUS at 13:15

## 2022-09-30 RX ADMIN — NYSTATIN 1000000 UNITS: 100000 SUSPENSION ORAL at 12:40

## 2022-09-30 RX ADMIN — ORAL VEHICLES - SUSP 12.5 MG: SUSPENSION at 09:34

## 2022-09-30 RX ADMIN — TACROLIMUS 4 MG: 5 CAPSULE ORAL at 18:10

## 2022-09-30 RX ADMIN — GABAPENTIN 200 MG: 250 SOLUTION ORAL at 21:40

## 2022-09-30 RX ADMIN — Medication 5 ML: at 09:59

## 2022-09-30 RX ADMIN — NYSTATIN 1000000 UNITS: 100000 SUSPENSION ORAL at 17:06

## 2022-09-30 RX ADMIN — CALCIUM CARBONATE 600 MG (1,500 MG)-VITAMIN D3 400 UNIT TABLET 1 TABLET: at 10:00

## 2022-09-30 ASSESSMENT — ACTIVITIES OF DAILY LIVING (ADL)
ADLS_ACUITY_SCORE: 34
ADLS_ACUITY_SCORE: 33
ADLS_ACUITY_SCORE: 31
ADLS_ACUITY_SCORE: 31
ADLS_ACUITY_SCORE: 34
ADLS_ACUITY_SCORE: 34
ADLS_ACUITY_SCORE: 31
ADLS_ACUITY_SCORE: 31
ADLS_ACUITY_SCORE: 34
ADLS_ACUITY_SCORE: 34
ADLS_ACUITY_SCORE: 33
ADLS_ACUITY_SCORE: 31

## 2022-09-30 NOTE — PROGRESS NOTES
Care Coordinator  D/I: Pt need OP HD arranged while staying at [facility] locally.  P: I have fax'd hep B labs/covid@ 0975. [address] is still pending and need to fax: done @ 2pmPlan for discharge early next week. I updated Dr Greg Mujica yesterday. Wait for [facility]missions to call with unit/time____Will follow.

## 2022-09-30 NOTE — PROGRESS NOTES
Care Coordinator  D/I: I called Claudia at [facility] [phone] x [phone].  I informed her that pt's CT was puylled 9/29  I informed her they should have all labs  P: Claudia requested [facility] chair wait for acceptance______ Plan discharge early next week.

## 2022-09-30 NOTE — PLAN OF CARE
Tube feed at goal at 40 mL/hr. Pt tolerating without c/o nausea.PEG output 25 mL noon-1800.

## 2022-09-30 NOTE — PROGRESS NOTES
"[id number]    NEURO: A&O x4; able to make needs known; calls appropriately    RESPIRATORY: Room air-1L NC; sats >94%   CARDIAC: Not on telemetry   GI/: Voiding appropriately; no BM this shift   SKIN: PEG/J site; compression stockings on BLE   PAIN: Denies   TESTS/PROCEDURES: Plan for gastric emptying study today  MOBILITY: SBA   DIET: NPO   LDAs: PIV x1 SL; RIJ for HD     PLAN: Gastric emptying study to be completed today; Continue POC; notify the primary team with any concerns/updates.     I/O this shift:  In: 60 [NG/GT:60]  Out: 250 [Urine:250]     /73 (BP Location: Right arm)   Pulse 91   Temp 98.6  F (37  C) (Oral)   Resp 16   Ht 1.588 m (5' 2.5\")   Wt 71.8 kg (158 lb 4.8 oz)   SpO2 93%   BMI 28.49 kg/m          "

## 2022-09-30 NOTE — PROGRESS NOTES
Pulmonary Medicine  Cystic Fibrosis - Lung Transplant Team  Progress Note  2022       Patient: Sofie Rodriguez  MRN: [id number]  : 1960 (age 62 year old)  Transplant: 2022 (Lung), POD#94  Admission date: 2022    Assessment & Plan:     Sofie Rodriguez is a 62-year-old female s/p BSLT (22) for COPD complicated by persistent bilateral pleural effusions, persistent CO2 retention, right hemidiaphragm palsy, BACILIO (dialysis -), C. diff colitis, gastroparesis s/p GJ tube placement (22), GI bleed 2/2 pyloric ulcer, hemobilia s/p ERCP and MRCP (), and persistent vasoplegia.  Other history notable for HFpEF, NTM colonoization, hepatitis C, and methamphetamine use.  Patient admitted 22 with persistent dyspnea (increased with activity), daily morning hemoptysis, and increased BLE edema.  Also noted to have persistent, increased bilateral pleural effusions, diffuse bilateral groundglass changes, and more focal appearing LLL infiltrates on imaging.  Treating with aggressive diuresis with some improvement in symptoms and hypoxia.  Hemoptysis resolved, mild intermittent hypoxia and ANAYA persisted.  S/p bilateral chest tubes.  Episode of AMS now resolved.  S/p tunneled line placed and iHD initiation ().       Today's recommendations:  - Repeat CXR today  - Tacro level ordered 10/3  - Next prednisone taper due 10/13  - CMV pending from today  - DSA ordered 10/5  - IgG monthly, next due 10/29  - Titrate loperamide to 2-3 stools daily (without urgency)  - G tube to gravity drainage prn with GI symptoms (N/V, bloating, reflux); full liquid diet for comfort only  - Repeat gastric emptying study today to evaluate for ability to transition medications to PO and progress diet, unfortunately with persistent severe retention (91% at 4h) and some fullness and nausea post study; will need to defer adjustments to current plan and consider semi-permanent status as is  - Repeat EGD 10/13 to check  healing of ulcer, sooner if hgb declines further     S/p bilateral sequential lung transplant (BSLT) for end stage COPD:  Acute hypoxia with chronic hypercapnia:   Pulmonary edema:  Persistent bibasilar pleural effusions:   Right hemidiaphragm palsy: Admitted with increased dyspnea with minimal exertion and small volume daily hemoptysis.  Also with marked decline in PFTs (FEV1 1.22L, 50% on 8/18 to 0.98L, 40% on 9/7).  Chest CT (9/8) with increased effusions and groundglass changes.  DSA positive but stable (as below).  BNP markedly elevated (30k) and also with increased BLE edema.  Etiology unclear and is likely multi-factorial with DDx including pulmonary edema from hypervolemia/progressive HFpEF, rejection (ACR +/- AMR), alveolar hemorrhage, and/or infection.  Aggressively diuresed with some improvement in symptoms.  Bronch (9/13) unremarkable, BAL of lingula sent, cultures no growth (GPC on gram stain only).  S/p right pigtail chest tube placement (9/13-9/22), transudative with moderate output initially but quickly decreasing, cultures negative.  S/p aspiration of left pleural effusion (9/13) and then pigtail chest tube (9/15-9/29) s/p full lytic course (916-9/19), cultures also negative, clamped 9/26.  Chest CT (9/27) with grossly unchanged small bilateral hydroPTX with left chest tube coiled in inferior medial left pleural space, continued resolution of nodular opacity in posterior MARLON, and the lobes of the left upper and lower are rotated with respect to each other (discussed at transplant conference 9/29, no intervention).  Weaned to RA at rest on 9/24, using 1L NC overnight.  VBG with improved hypercapnia 9/29.  Repeat CXR today stable (personally reviewed).  - Supplemental O2 to keep >92%, exercise and overnight oximetry study 24-48h prior to discharge  - Continue to defer NIPPV, repeat VBG prn with change in mentation/clinical status  - IS and Aerobika q1h w/a and encourage OOB during the day as much as  able     Immunosuppression:  - Tacrolimus 4.5 mg qAM / 4 mg qPM (increased 9/19).  Goal level 8-12.  Repeat level 10/3 (ordered).  - Azathioprine 100 mg daily (9/20, MMF stopped due to ongoing diarrhea)  - Prednisone 10 mg qAM / 7.5 mg qPM with next taper due 10/13 (not yet ordered), defer accelerated taper (with elevated BUN) at this time  Date AM dose (mg) PM dose (mg)   9/15/22 10 7.5   10/13/22 7.5 7.5   11/10/22 7.5 5   12/8/22 5 5   1/5/23 5 2.5      Prophylaxis:   - Dapsone qMWF for PJP ppx (resumed 9/19, monitor for worsening anemia; Bactrim stopped d/t hyperkalemia, will need to monitor for recurrence of anemia with dapsone; Pentamidine neb not tolerated on 9/7 after only 5 minutes d/t excessive coughing)  - Completed VGCV for CMV ppx 9/26 (through POD#90), D+/R+, CMV (9/30) pending with monitoring every other week (starting 10/5, ordered)      Positive DSA: Newly positive on 8/10 with DQB2 mfi 2155.   - Monitoring [id number], next due 10/5 (ordered), see below for trend:  Date DQB2 Notes   8/10 2155     9/1 7033 Current peak   9/21 5390 Most recent       EBV viremia: Low level (1374 on 9/7), not likely to be clinically significant.  - Follow EBV monthly (ordred 10/5)     Hypogammaglobulinemia: IgG adequate at time of transplant, repeat level low (336) on 7/28.  S/p IVIG 7/30, tolerated well.  IgG on 9/29 low at 559 but not indicating IVIG replacement.   - Follow IgG monthly (next due 10/29)     Other relevant problems being managed by the primary team:     Encephalopathy, Resolved:  Tremors:   Presumed UTI: Noted 9/19 with confusion as well as tremor.  DDx including hypercapnia, hyperammonia, infection, uremia (see below), medication related.  VBG with worsening hypercapnia (99).  BUN elevated (112).  Ammonia normal (24).  CRP normal, procal 0.1.  UA with moderate blood, large leukocyte esterase, but UC (9/19) negative.  Blood culture (9/19) negative.  Tacro not supratherapeutic.  Head CT (9/20) without acute  intracranial pathology.  AMS resolved ~9/22.  S/p ceftriaxone 5-day course (9/20-9/24).     ESRD based upon Cystatin C (GFR of 10): Nephrology consulted 9/21, see their note for details, with concern for CKD5 as Cr may suggest overestimation of kidney function with limited muscle mass, unclear if trend over the past week reflects BACILIO.  Cystatin C 4.3 with GFR 10.  Making urine.  Renal US (9/22) normal.  S/p tunneled line placed and HD initiated 9/26.  Management per nephrology.     Diarrhea: C diff negative on 9/10 and 9/20.  Likely medication related (MMF), but unable to transition to Myfortic given NPO.  Loperamide utilized with some benefit.   Enteric stool panel negative 9/16.  Transitioned from MMF to AZA as above.   - Goal to titrate to antidiarrheal 3 stools daily (without urgency), management per primary.      Severe gastroparesis:   Pyloric ulcer: Gastric emptying study 7/20 with severe gastric emptying delay (95% retention at 4 hours), s/p GJ tube placement in IR 7/27.  S/p EGD (8/3) noted to have pyloric ulcer and excessive gastric fluid and residual food.  S/p EGD (8/11) with mild erythema 2/2 GJ tube trauma seen near insertion site but no active bleeding.  - PPI BID  - TF continuous and ALL enteral medications via J tube  - G tube to gravity drainage prn with GI symptoms (N/V, bloating, reflux); full liquid diet for comfort only  - Repeat gastric emptying study today to evaluate for ability to transition medications to PO and progress diet, unfortunately with persistent severe retention (91% at 4h) and some fullness and nausea post study; will need to defer adjustments to current plan and consider semi-permanent status as is  - Repeat EGD 10/13 to check healing of ulcer, sooner if hgb declines further     We appreciate the excellent care provided by the [place] [id number] team.  Recommendations communicated via in person rounding and this note.  Will continue to follow along closely, please do not  "hesitate to call with any questions or concerns.     Patient discussed with Dr. Gong.    Catherine Johnson, DNP, APRN, CNP  Inpatient Nurse Practitioner  Pulmonary CF/Transplant     Subjective & Interval History:     Primarily on RA, intermittently using 1L NC.  No new cough or sputum.  Left chest tube removed yesterday.  Had not had G tube vented for several days d/t absence of nausea and fullness, but now requesting to resume venting post gastric emptying study d/t return of symptoms.  Pt. stated \"I feel like it's going to be bad based on how I feel\".  Loose stool frequency stable at 2-3 episodes yesterday, but more watery with more urgency recently.  BLE edema gradually improving, planned for iHD today.    Review of Systems:     C: No fever, no chills, + gradually increased weight  INTEGUMENTARY/SKIN: No rash or obvious new lesions  ENT/MOUTH: No sore throat, no sinus pain, no nasal congestion or drainage  RESP: See interval history  CV: No chest pain, no palpitations, + peripheral edema, no orthopnea  GI: No reflux symptoms  : No dysuria  MUSCULOSKELETAL: + incisional pain/tightness  NEURO: No headache, no numbness or tingling  PSYCHIATRIC: Mood stable    Physical Exam:     All notes, images, and labs from past 24 hours (at minimum) were reviewed.    Vital signs:  Temp: 98.6  F (37  C) Temp src: Oral BP: 137/73 Pulse: 91   Resp: 16 SpO2: 93 % O2 Device: Nasal cannula Oxygen Delivery: 1 LPM Height: 158.8 cm (5' 2.5\") Weight: 71.8 kg (158 lb 4.8 oz)  I/O:     Intake/Output Summary (Last 24 hours) at 9/30/2022 0806  Last data filed at 9/30/2022 0700  Gross per 24 hour   Intake 660 ml   Output 975 ml   Net -315 ml     Constitutional: Sitting up in a chair, in no apparent distress.   HEENT: Eyes with pink conjunctivae, anicteric.  Oral mucosa moist without lesions.    PULM: Mildly diminished bases bilaterally.  No crackles, no rhonchi, no wheezes.  Non-labored breathing on RA.  CV: Normal S1 and S2.  RRR.  No murmur, " gallop, or rub.  1-2+ BLE edema (lymph wraps in place).   ABD: NABS, soft, nontender, nondistended.  PEG/J tube site cdi.  MSK: Moves all extremities.  No apparent muscle wasting.   NEURO: Alert and oriented, conversant.   SKIN: Warm, dry.  No rash on limited exam.   PSYCH: Mood stable.     Lines, Drains, and Devices:  Peripheral IV 09/10/22 Anterior;Left Lower forearm (Active)   Site Assessment [facility] 09/30/22 0000   Line Status Saline locked 09/30/22 0000   Dressing Intervention New dressing  09/10/22 0135   Phlebitis Scale 0-->no symptoms 09/30/22 0000   Infiltration Scale 0 09/30/22 0000   Number of days: 20       CVC Double Lumen Right Internal jugular Tunneled (Active)   Site Assessment [facility] 09/30/22 0000   Dressing Type Chlorhexidine disk;Gauze;Transparent 09/28/22 1153   Dressing Status dry;intact 09/28/22 1153   Dressing Change Due 10/03/22 09/28/22 1153   Line Necessity yes, meets criteria 09/28/22 1700   Blue - Status saline locked;cap changed 09/28/22 1153   Blue - Cap Change Due 10/05/22 09/28/22 1153   Red - Status saline locked;cap changed 09/28/22 1153   Red - Cap Change Due 10/05/22 09/28/22 1153   Number of days: 4     Data:     LABS    CMP:   Recent Labs   Lab 09/30/22  0610 09/30/22  0445 09/29/22  2210 09/29/22  1632 09/29/22  0944 09/29/22  0603 09/28/22  0809 09/28/22  0514 09/27/22  0950 09/27/22  0511 09/26/22  0913 09/26/22  0555 09/25/22  0933 09/25/22  0736   *  --   --   --   --  133*  --  133*  --  135*  --  137  --   --    POTASSIUM 4.4  --   --   --   --  4.1  --  4.2  --  4.1  --  4.9  --   --    CHLORIDE 95*  --   --   --   --  94*  --  95*  --  91*  --  87*  --   --    CO2 31*  --   --   --   --  31*  --  32*  --  39*  --  44*  --   --    ANIONGAP 8  --   --   --   --  8  --  6*  --  5*  --  6*  --   --    * 148* 169* 177*   < > 129*   < > 143*   < > 173*   < > 148*   < >  --    BUN 39.0*  --   --   --   --  22.9  --  39.2*  --  63.4*  --  96.7*  --   --    CR 2.09*  --    --   --   --  1.60*  --  1.44*  --  1.48*  --  1.72*  --   --    GFRESTIMATED 26*  --   --   --   --  37*  --  41*  --  40*  --  33*  --   --    ESTUARDO 9.1  --   --   --   --  8.8  --  8.9  --  9.0  --  9.4  --   --    MAG  --   --   --   --   --   --   --  2.0  --  2.3  --  2.7*  --  2.7*   PHOS  --   --   --   --   --   --   --  3.2  --  3.5  --  3.7  --   --    PROTTOTAL 5.1*  --   --   --   --  5.3*  --  5.1*  --  5.2*  --  5.4*  --   --    ALBUMIN 3.0*  --   --   --   --  3.0*  --  2.9*  --  3.1*  --  3.1*  --   --    BILITOTAL 0.2  --   --   --   --  0.2  --  0.2  --  0.2  --  0.2  --   --    ALKPHOS 71  --   --   --   --  87  --  86  --  85  --  79  --   --    AST 19  --   --   --   --  21  --  20  --  21  --  25  --   --    ALT 12  --   --   --   --  16  --  13  --  12  --  11  --   --     < > = values in this interval not displayed.     CBC:   Recent Labs   Lab 09/28/22  0514 09/27/22  0511 09/26/22  0555 09/24/22  0615   WBC 10.5 8.4 8.5 7.1   RBC 2.65* 2.51* 2.65* 2.55*   HGB 8.4* 7.9* 8.2* 7.9*   HCT 27.0* 26.3* 27.4* 26.9*   * 105* 103* 106*   MCH 31.7 31.5 30.9 31.0   MCHC 31.1* 30.0* 29.9* 29.4*   RDW 19.8* 19.9* 19.9* 19.6*    217 224 170       INR: No lab results found in last 7 days.    Glucose:   Recent Labs   Lab 09/30/22  0610 09/30/22  0445 09/29/22  2210 09/29/22  1632 09/29/22  0944 09/29/22  0603   * 148* 169* 177* 182* 129*       Blood Gas:   Recent Labs   Lab 09/29/22  0603   PHV 7.36   PCO2V 60*   PO2V 48*   HCO3V 34*   LINDY 6.7*   O2PER 0       Culture Data No results for input(s): CULT in the last 168 hours.    Virology Data:   Lab Results   Component Value Date    FLUAH1 Not Detected 09/13/2022    FLUAH3 Not Detected 09/13/2022    BG0164 Not Detected 09/13/2022    IFLUB Not Detected 09/13/2022    RSVA Not Detected 09/13/2022    RSVB Not Detected 09/13/2022    PIV1 Not Detected 09/13/2022    PIV2 Not Detected 09/13/2022    PIV3 Not Detected 09/13/2022    HMPV Not  Detected 09/13/2022       Historical CMV results (last 3 of prior testing):  Lab Results   Component Value Date    CMVQNT Not Detected 09/13/2022    CMVQNT Not Detected 09/07/2022    CMVQNT Not Detected 09/01/2022     No results found for: CMVLOG    Urine Studies    Recent Labs   Lab Test 09/19/22  2254 08/01/22  0319 07/08/22  0831   URINEPH 7.0 8.0* 5.5   NITRITE Negative Negative Negative   LEUKEST Large* Negative Negative   WBCU 29*  --  1       Most Recent [place] Pulmonary Function Testing (FVC/FEV1 only)  FVC-Pre   Date Value Ref Range Status   09/07/2022 1.01 L    09/01/2022 1.07 L    08/24/2022 1.23 L    08/18/2022 1.33 L      FVC-%Pred-Pre   Date Value Ref Range Status   09/07/2022 33 %    09/01/2022 35 %    08/24/2022 40 %    08/18/2022 43 %      FEV1-Pre   Date Value Ref Range Status   09/07/2022 0.98 L    09/01/2022 1.02 L    08/24/2022 1.17 L    08/18/2022 1.22 L      FEV1-%Pred-Pre   Date Value Ref Range Status   09/07/2022 40 %    09/01/2022 42 %    08/24/2022 48 %    08/18/2022 50 %        IMAGING    No results found for this or any previous visit (from the past 48 hour(s)).

## 2022-09-30 NOTE — PROGRESS NOTES
HEMODIALYSIS TREATMENT NOTE    Date: 9/30/2022  Time: 4:24 PM    Data:  Pre Wt:  71.8kg   Desired Wt:70.8   kg   Post Wt:  70.8  Weight change:-1   kg  Ultrafiltration - Post Run Net Total Removed (mL): 1000 mL  Vascular Access Status: patent  Dialyzer Rinse: Streaked, Moderate  Total Blood Volume Processed: 67.67 L Liters  Total Dialysis (Treatment) Time: 3 Hours    Lab:        Interventions:  3 hours of HD with 1000 ml;s pulled with no complications.     Assessment:  New onset ESRD patient in for her regular HD run VSS A+O     Plan:    Per renal team

## 2022-09-30 NOTE — PROGRESS NOTES
Care Management Follow Up    Length of Stay (days): 19    Expected Discharge Date: 10/03/2022     Concerns to be Addressed: all concerns addressed in this encounter     Patient plan of care discussed at interdisciplinary rounds: Yes    Anticipated Discharge Disposition: Home- To [place] [address] with OP dialysis.     Anticipated Discharge Services: Dialysis   Anticipated Discharge DME:      Education Provided on the Discharge Plan:  Pt is aware of the discharge plan.   Patient/Family in Agreement with the Plan:  Yes. Pt will discharge early next week.    Referrals Placed by CM/SW:  None  Private pay costs discussed: Not applicable    Additional Information:  Pt was sleepy but rousable. Brief visit to offer support.  Pt was very sleepy though seems more clear headed this date. She stated that she is holding up ok despite everything.  She is glad they have gotten her nausea under better control. She is looking forward to getting back to [place]. SW will remain available for support, resources and education.       Sophia Romero, MSW [id number]  Covering for Mana MULTANI

## 2022-09-30 NOTE — PLAN OF CARE
"Temp: 98.6  F (37  C) Temp src: Oral BP: 130/60 Pulse: 96   Resp: 22 SpO2: 99 % O2 Device: None (Room air) Oxygen Delivery: 1 LPM    Neuro: A/O x4, generalized weakness, SBA in room  Cardiac: Not on monitor, BPs stable  Resp: Pt with occasional SOB with activity, also has nursing check O2 sats prn bc she is unaware of lower O2 sats at times. Using 1-2L humidified NC as needed  GI/: Pt to nuc med today for gastric emptying study. Tolerated eggs and toast meal, the Q1hr xays via wheelchair x 4. Exam completed approx noon today. Pt states feels like food did not move through very well. Pt had tube feeds held for exam this morning, restarted via Jtube at 1300 today per provider OK. All meds given via J tube per note on MAR. Gastric port with gravity drainage bag for pt comfort. Pt voiding in bathroom, mixed with loose stools. BM x 3, small, watery/loose/\"mush\". Imodium given following gastric emptying study.   Skin: Intact, chest tube site C/D/I after removal yesterday. Lymphedema wraps in place on BLE.   Access: PIV x 1, HD line C/D/I    Awaiting results from testing, supportive care per plan of care. Notify team with changes.         Goal Outcome Evaluation:    Plan of Care Reviewed With: patient     Overall Patient Progress: no change           "

## 2022-09-30 NOTE — PROGRESS NOTES
Nephrology Progress Note  09/30/2022     Ms Rodriguez is a 61 yo female with PMH end stage COPD s/p bilateral sequential lung transplant (6/22) on triple IS ( MMF/Tacro/pred), pyloric ulcer, recent ERCP c/f hemobilia, gastroparesis s/p GJ tube placement and Percutaneous J tube , BACILIO requiring short term HD, admitted 9/9/22 for planned admission from transplant pulmonology to work up antibody medicated rejection versus infection. She was found to have ESRD with Cystatin C GFR of 10 ml/mn.      Assessment & Recommendations:     1. ESRD based upon Cystatin C ( GFR of 10) - Creat has remained stable in the mid to upper 1 range, but BUN was elevated out of proportion to her creat.    - It was felt that patient's symptoms of fatigue, poor appetite, recent confusion and elevated BUN indicated poor renal function. Given low muscle mass Cystatin C was obtained and found to be 10 ml/mn   - Admission BUN was 82.3. She peaked at 112 on 9/20. BUN down to 39 today.      - Tunneled HD line placed 9/26/22   - Patient initiated HD 9/26/22. Has completed 3 daily sessions. Next run today. Will follow [facility] schedule.     - RNCC has initiated OP HD at [facility]. the schedule doesn't matter. Whatever is available. Patient is staying at the [facility] for another several months, so [facility] would be ideal. RNCC working on transportation as well.      2. Volume status - No significant volume issue. Diarrhea persists and felt to be 2/2 TF. She is making less urine. Intake 1020.  ml thus far today. Bps 120-130/. Pre run weight 71.8 kg. Admission weight 73.4 kg   - Will try for 1 liter fluid removal today   - Please get standing pre run weights   - Continue strict I/O    3. CV - Pre run b/ps 120-130/. Minimal edema. On Metoprolol 12.5 mg bid    4. Lung transplant IS: AZa, Pred, Tac. Tac level 11.5   - Per transplant    5. Electrolytes - No acute concerns. K 4.4, Na 134    6. Acid base - No acute concerns. Bicarb 31    7.  "BMD - Ca 9.1, Phos 3.2, albumin 3.0   - PTH 46, Vit D 25 ( 9/22)   - Continue Ca/D    8. Anemia - Hgb 8.4   - Last RBC 8/31   - Pyloric ulcer per EGD 8/3/22   - Ferritin 769, Fe 54 IS 22 ( 9/22)   - On B12, folic acid, PPI   - Began Epo 5000 U IV q run 9/27    9. Steroid induced DM - On insulin   - Per primary team    10. Disposition - Discharge likely early next week    Recommendations were communicated to primary team directly    Emili Nolasco NP   Division of Renal Disease and Hypertension  [facility]  [employer] Web Console    Interval History :   Nursing and provider notes from last 24 hours reviewed.  Seen on HD.   Tolerating w/o problem    Review of Systems:   I reviewed the following systems:  GI: Tolerating some oral intake but mostly receiving TF through her J tube. Diarrhea continues  Neuro:  Alert/interactive  Constitutional:  no fever or chills  CV: Overall her breathing has improved from hospital admission. Minimal edema, no CP   : oliguric    Physical Exam:   I/O last 3 completed shifts:  In: 740 [NG/GT:420]  Out: 840 [Urine:500; Emesis/NG output:140; Other:200]   /67   Pulse 97   Temp 98.6  F (37  C) (Oral)   Resp 26   Ht 1.588 m (5' 2.5\")   Wt 71.8 kg (158 lb 4.8 oz)   SpO2 99%   BMI 28.49 kg/m       GENERAL APPEARANCE: Comfortable on HD.   EYES:  no scleral icterus, pupils equal  PULM: lungs CTA. Breathing is non labored. Off supplemental oxygen.   CV: tachy      -edema- legs wrapped   GI: soft, NT, Non distended.   INTEGUMENT: no  rash  NEURO:  Alert/interactive  Access - Right TDC    Labs:   All labs reviewed by me  Electrolytes/Renal -   Recent Labs   Lab Test 09/30/22  1020 09/30/22  0610 09/30/22  0445 09/29/22  0944 09/29/22  0603 09/28/22  0809 09/28/22  0514 09/27/22  0950 09/27/22  0511 09/26/22  0913 09/26/22  0555   NA  --  134*  --   --  133*  --  133*  --  135*  --  137   POTASSIUM  --  4.4  --   --  4.1  --  4.2  --  4.1  --  4.9   CHLORIDE  --  95*  --   --  " 94*  --  95*  --  91*  --  87*   CO2  --  31*  --   --  31*  --  32*  --  39*  --  44*   BUN  --  39.0*  --   --  22.9  --  39.2*  --  63.4*  --  96.7*   CR  --  2.09*  --   --  1.60*  --  1.44*  --  1.48*  --  1.72*   * 127* 148*   < > 129*   < > 143*   < > 173*   < > 148*   ESTUARDO  --  9.1  --   --  8.8  --  8.9  --  9.0  --  9.4   MAG  --   --   --   --   --   --  2.0  --  2.3  --  2.7*   PHOS  --   --   --   --   --   --  3.2  --  3.5  --  3.7    < > = values in this interval not displayed.       CBC -   Recent Labs   Lab Test 09/28/22  0514 09/27/22  0511 09/26/22  0555   WBC 10.5 8.4 8.5   HGB 8.4* 7.9* 8.2*    217 224       LFTs -   Recent Labs   Lab Test 09/30/22  0610 09/29/22  0603 09/28/22  0514   ALKPHOS 71 87 86   BILITOTAL 0.2 0.2 0.2   ALT 12 16 13   AST 19 21 20   PROTTOTAL 5.1* 5.3* 5.1*   ALBUMIN 3.0* 3.0* 2.9*       Iron Panel -   Recent Labs   Lab Test 09/26/22  0555 09/03/22  1039 08/24/22  0810   IRON 54 21* 41   IRONSAT 22 9* 21   CARLOS 769* 343* 334*         Imaging:    Current Medications:    azaTHIOprine  100 mg Per J Tube Daily     B and C vitamin Complex with folic acid  5 mL Oral Daily     calcium carbonate 600 mg-vitamin D 400 units  1 tablet Per J Tube BID w/meals     cyanocobalamin  500 mcg Per Feeding Tube Daily     dapsone  50 mg Per J Tube Q Mon Wed Fri AM     fiber modular (NUTRISOURCE FIBER)  1 packet Per Feeding Tube BID     folic acid  1 mg Oral or Feeding Tube Daily     gabapentin  200 mg Oral At Bedtime     sodium chloride (PF) 0.9%  1.3-2.6 mL Intracatheter Once    Followed by     heparin  3 mL Intracatheter Once     sodium chloride (PF) 0.9%  1.3-2.6 mL Intracatheter Once    Followed by     heparin  3 mL Intracatheter Once     heparin ANTICOAGULANT  5,000 Units Subcutaneous Q12H     insulin aspart  1-6 Units Subcutaneous Q6H     [Held by provider] insulin glargine  6 Units Subcutaneous QAM AC     lidocaine  2 patch Transdermal Q24h    And     lidocaine    Transdermal Q8H TONY     menthol  1 patch Topical QAM    And     menthol   Transdermal Q8H     menthol   Transdermal Daily     metoprolol  12.5 mg Per J Tube BID     nystatin  1,000,000 Units Swish & Swallow 4x Daily     pantoprazole  40 mg Per J Tube BID     prednisoLONE  10 mg Per J Tube Daily     prednisoLONE  7.5 mg Per J Tube QPM     sodium chloride (PF)  3 mL Intracatheter Q8H     sodium chloride (PF)  9 mL Intracatheter During Dialysis/CRRT (from stock)     sodium chloride (PF)  9 mL Intracatheter During Dialysis/CRRT (from stock)     tacrolimus  4 mg Per J Tube QPM     tacrolimus  4.5 mg Per J Tube QAM       dextrose       - MEDICATION INSTRUCTIONS -       - MEDICATION INSTRUCTIONS -       Emili Nolasco, NP

## 2022-10-01 LAB
ALBUMIN SERPL BCG-MCNC: 3 G/DL (ref 3.5–5.2)
ALP SERPL-CCNC: 80 U/L (ref 35–104)
ALT SERPL W P-5'-P-CCNC: 9 U/L (ref 10–35)
ANION GAP SERPL CALCULATED.3IONS-SCNC: 4 MMOL/L (ref 7–15)
AST SERPL W P-5'-P-CCNC: 17 U/L (ref 10–35)
BILIRUB SERPL-MCNC: 0.2 MG/DL
BUN SERPL-MCNC: 24.8 MG/DL (ref 8–23)
CALCIUM SERPL-MCNC: 8.8 MG/DL (ref 8.8–10.2)
CHLORIDE SERPL-SCNC: 95 MMOL/L (ref 98–107)
CREAT SERPL-MCNC: 1.71 MG/DL (ref 0.51–0.95)
DEPRECATED HCO3 PLAS-SCNC: 33 MMOL/L (ref 22–29)
GFR SERPL CREATININE-BSD FRML MDRD: 34 ML/MIN/1.73M2
GLUCOSE BLDC GLUCOMTR-MCNC: 139 MG/DL (ref 70–99)
GLUCOSE BLDC GLUCOMTR-MCNC: 149 MG/DL (ref 70–99)
GLUCOSE BLDC GLUCOMTR-MCNC: 155 MG/DL (ref 70–99)
GLUCOSE BLDC GLUCOMTR-MCNC: 189 MG/DL (ref 70–99)
GLUCOSE SERPL-MCNC: 132 MG/DL (ref 70–99)
POTASSIUM SERPL-SCNC: 4.7 MMOL/L (ref 3.4–5.3)
PROT SERPL-MCNC: 5.1 G/DL (ref 6.4–8.3)
SODIUM SERPL-SCNC: 132 MMOL/L (ref 136–145)

## 2022-10-01 PROCEDURE — 250N000011 HC RX IP 250 OP 636: Performed by: STUDENT IN AN ORGANIZED HEALTH CARE EDUCATION/TRAINING PROGRAM

## 2022-10-01 PROCEDURE — 214N000001 HC R&B CCU UMMC

## 2022-10-01 PROCEDURE — 250N000013 HC RX MED GY IP 250 OP 250 PS 637: Performed by: INTERNAL MEDICINE

## 2022-10-01 PROCEDURE — 80053 COMPREHEN METABOLIC PANEL: CPT

## 2022-10-01 PROCEDURE — 36415 COLL VENOUS BLD VENIPUNCTURE: CPT

## 2022-10-01 PROCEDURE — 250N000009 HC RX 250: Performed by: INTERNAL MEDICINE

## 2022-10-01 PROCEDURE — 250N000013 HC RX MED GY IP 250 OP 250 PS 637: Performed by: STUDENT IN AN ORGANIZED HEALTH CARE EDUCATION/TRAINING PROGRAM

## 2022-10-01 PROCEDURE — 99232 SBSQ HOSP IP/OBS MODERATE 35: CPT | Performed by: PEDIATRICS

## 2022-10-01 PROCEDURE — 250N000012 HC RX MED GY IP 250 OP 636 PS 637: Performed by: NURSE PRACTITIONER

## 2022-10-01 PROCEDURE — 250N000013 HC RX MED GY IP 250 OP 250 PS 637: Performed by: NURSE PRACTITIONER

## 2022-10-01 PROCEDURE — 250N000013 HC RX MED GY IP 250 OP 250 PS 637

## 2022-10-01 PROCEDURE — 99233 SBSQ HOSP IP/OBS HIGH 50: CPT | Performed by: PHYSICIAN ASSISTANT

## 2022-10-01 RX ADMIN — INSULIN ASPART 1 UNITS: 100 INJECTION, SOLUTION INTRAVENOUS; SUBCUTANEOUS at 10:26

## 2022-10-01 RX ADMIN — TACROLIMUS 4.5 MG: 5 CAPSULE ORAL at 08:25

## 2022-10-01 RX ADMIN — ORAL VEHICLES - SUSP 12.5 MG: SUSPENSION at 20:29

## 2022-10-01 RX ADMIN — HEPARIN SODIUM 5000 UNITS: 5000 INJECTION, SOLUTION INTRAVENOUS; SUBCUTANEOUS at 20:29

## 2022-10-01 RX ADMIN — LOPERAMIDE HYDROCHLORIDE 2 MG: 2 CAPSULE ORAL at 20:38

## 2022-10-01 RX ADMIN — GABAPENTIN 200 MG: 250 SOLUTION ORAL at 20:40

## 2022-10-01 RX ADMIN — OXYCODONE HYDROCHLORIDE 5 MG: 5 SOLUTION ORAL at 02:54

## 2022-10-01 RX ADMIN — FOLIC ACID 1 MG: 1 TABLET ORAL at 08:24

## 2022-10-01 RX ADMIN — Medication 40 MG: at 20:29

## 2022-10-01 RX ADMIN — NYSTATIN 1000000 UNITS: 100000 SUSPENSION ORAL at 20:29

## 2022-10-01 RX ADMIN — LOPERAMIDE HYDROCHLORIDE 2 MG: 2 CAPSULE ORAL at 11:49

## 2022-10-01 RX ADMIN — TACROLIMUS 4 MG: 5 CAPSULE ORAL at 17:51

## 2022-10-01 RX ADMIN — INSULIN ASPART 1 UNITS: 100 INJECTION, SOLUTION INTRAVENOUS; SUBCUTANEOUS at 03:03

## 2022-10-01 RX ADMIN — PREDNISOLONE 7.5 MG: 15 SOLUTION ORAL at 20:38

## 2022-10-01 RX ADMIN — Medication 5 ML: at 08:24

## 2022-10-01 RX ADMIN — INSULIN ASPART 1 UNITS: 100 INJECTION, SOLUTION INTRAVENOUS; SUBCUTANEOUS at 17:01

## 2022-10-01 RX ADMIN — NYSTATIN 1000000 UNITS: 100000 SUSPENSION ORAL at 08:41

## 2022-10-01 RX ADMIN — Medication 100 MG: at 08:25

## 2022-10-01 RX ADMIN — Medication 1 PACKET: at 08:25

## 2022-10-01 RX ADMIN — CALCIUM CARBONATE 600 MG (1,500 MG)-VITAMIN D3 400 UNIT TABLET 1 TABLET: at 17:03

## 2022-10-01 RX ADMIN — Medication 40 MG: at 08:24

## 2022-10-01 RX ADMIN — NYSTATIN 1000000 UNITS: 100000 SUSPENSION ORAL at 16:56

## 2022-10-01 RX ADMIN — OXYCODONE HYDROCHLORIDE 5 MG: 5 SOLUTION ORAL at 20:39

## 2022-10-01 RX ADMIN — CALCIUM CARBONATE 600 MG (1,500 MG)-VITAMIN D3 400 UNIT TABLET 1 TABLET: at 08:24

## 2022-10-01 RX ADMIN — ORAL VEHICLES - SUSP 12.5 MG: SUSPENSION at 08:24

## 2022-10-01 RX ADMIN — NYSTATIN 1000000 UNITS: 100000 SUSPENSION ORAL at 11:49

## 2022-10-01 RX ADMIN — HEPARIN SODIUM 5000 UNITS: 5000 INJECTION, SOLUTION INTRAVENOUS; SUBCUTANEOUS at 08:36

## 2022-10-01 RX ADMIN — Medication 1 PACKET: at 20:29

## 2022-10-01 RX ADMIN — CYANOCOBALAMIN TAB 500 MCG 500 MCG: 500 TAB at 08:23

## 2022-10-01 RX ADMIN — PREDNISOLONE 10 MG: 15 SOLUTION ORAL at 08:24

## 2022-10-01 ASSESSMENT — ACTIVITIES OF DAILY LIVING (ADL)
ADLS_ACUITY_SCORE: 33
ADLS_ACUITY_SCORE: 31
ADLS_ACUITY_SCORE: 33

## 2022-10-01 NOTE — PLAN OF CARE
"Goal Outcome Evaluation:    /72 (BP Location: Right arm, Patient Position: Semi-Schuster's, Cuff Size: Adult Regular)   Pulse 96   Temp 98.5  F (36.9  C) (Oral)   Resp 16   Ht 1.588 m (5' 2.5\")   Wt 70.7 kg (155 lb 14.4 oz)   SpO2 99%   BMI 28.06 kg/m      Plan of care reviewed with:patient    Overall patient progress:improving   Time: 6778-7798  Status: H/o of bilateral lung transplant on 06/28/22 due to COPD c/b persistent bilateral pleural effusions s/p chest tube x 2 this admission, removed recently, CO2 retention, R hemidiaphragm palsy, BACILIO, C-Diff colitis, gastroparesis s/p GJ tube placement. On tube feeding continuous.   Neuro/Pain:  A&Ox4, c/o low back pain Oxycodone 5 mg twice this shift.   Activity: SBA to bathroom/independent.   Cardiac/vs: No tele order, BP stable, denied chest pain, apical heart beat regular.   Respiratory: on room air, LS clear, and diminished on lower lobes.   GI/: small loose bm this shift. Active bowel sound. Voided a total of 100 ml using toilet this shift. G tube to gravity. J tube for tube feeding.   Diet:NPOTube feeding at goal rate 40 ml/hr continuous, tolerated well.   Skin: incontinent redness on her rectum area getting better.Old chest tube site dressing in place.   LDAs: PIV saline locked. J tube feeding, G tube to gravity.   Labs/Imaging: BS q6hrs.   Change this shift: none   Plan: continue to monitor.   "

## 2022-10-01 NOTE — PLAN OF CARE
Temp: 98.8  F (37.1  C) Temp src: Oral BP: 116/65 Pulse: 85   Resp: 18 SpO2: 97 % O2 Device: None (Room air) Oxygen Delivery: 1 LPM    Pt alert, oriented, up with SBA in room. VSS, minimal c/o pain in lower back. Tolerating TF, OK'd to have small bites of clear liquids following gastric emptying test results from 9/30. Pt verbalizes understanding that only small bites of food for comfort/pleasure. One loose stool mix with urine, imodium x 1 given. Pt lymphedema wraps removed, pt states swelling improved. Potential discharge early next week pending discussions regarding need for dialysis. Assess for changes, continue plan of care.      Goal Outcome Evaluation:    Plan of Care Reviewed With: patient     Overall Patient Progress: no change

## 2022-10-01 NOTE — PROGRESS NOTES
[facility]    Medicine Progress Note - Hospitalist Service, GOLD TEAM 10    Date of Admission:  9/9/2022    Assessment & Plan        62 year old female with pertinent hx of end stage COPD s/p bilateral sequential lung transplant (6/22) on triple IS ( MMF/Tacro/pred), pyloric ulcer, recent ERCP c/f hemobilia, gastroparesis s/p GJ tube placement and Percutaneous J tube presents for a planned admission from transplant pulmonology to work up antibody medicated rejection versus infection.     Today's updates  -- gastric emptying study 9-30-22, NPO at midnight before  -- zofran or compazine prn nausea  -- chest tube out today per pulm expert recs  -- will d/w Renal possibly pulling fluid off during HD    #Acute kidney injury AKIN stage II on CKD stage IIIb, all are present on admission  :: Patient has been variable GFRs 30-50s in the last few months. Most recent Cr trend 1.5-1.9 1.56 9/8/22 cystatin C obtained and GFR ~10; discussed with Renal team; ultimately decision to give trial of iHD and monitor for renal recovery  :: trend BMP strict input and output and daily body weight  :: Renal consulted, cont iHD for now; uneventful placement of tunneled catheter by IR on 9/26. first dialysis on 9/26.  Plan for dialysis on 9/27 and 9/28 then to assess for the need for further dialysis afterwards.   :: Vitamin D level -25;  parathyroid hormone = 46; c/w Calcium and vit D      #Diarrhea likely secondary to tube feeding, not present on admission, improved  :: repeatedly ruled out for C. difficile colitis.   :: c/w fibers at 28 g and Imodium as needed.     #Acute hypoxic hypercarbic respiratory failure secondary to bibasilar pleural effusion currently with chest tubes bilaterally on top of end-stage COPD s/p bilateral sequential lung transplant on 6/22/2022 complicated by chronic respiratory acidosis with compensation by metabolic alkalosis, all are present on admission   This is the  main problem that led to this admission.  The patient has bilateral chest tubes.  The patient had her right-sided chest tube removed on 9/22/2022.  -Left-sided chest tube is clamped with chest x-ray to be obtained on 9/27 for consideration of removal of left-sided chest tube  -DSA testing on 10/5/2022  -IgG level ordered for 9/29  -Vibha-Barr virus quantitative was ordered for 10/7/2022  -Continue 3 drug immunosuppression with azathioprine, prednisone, and tacrolimus  -Prednisone taper to be performed on 10/13/2022  -Continue oxycodone at 5 mg every 4 hours as needed for chest pain related to left-sided chest tube  -The patient declined any further lidocaine patches since 2 patches of lidocaine daily did not help with the pain  -Started Robaxin as needed for adjunct therapy for pain management  -Increased gabapentin to 200 mg nightly  -Continue BiPAP as detailed above  -Continue valganciclovir for cytomegalovirus prophylaxis through 9/28/2022 -- renally dosed for eGFR  -Continue dapsone for PJP prophylaxis  -Continue folic acid while on dapsone  -Continue nystatin.  Prophylaxis protocol post lung transplant  -I highly appreciate input of transplant pulmonology service        #Severe gastroparesis s/p  GJ tube placement 7/27/2022  #Significant delayed gastric emptying s/p gastrojejunostomy tube placement, present on admission  We will continue to utilize jejunal tube for nutrition.  I ordered a follow-up nuclear medicine gastric emptying study for evaluation of any hopefully improvement in the patient's gastric emptying  :: repeat GE study planned for 9/30/22 NPO at midnight 9/29  :: SARAH nutrition consult placed for TF  :: Percutaneous j tube in place with G venting to gravity      #Steroid-induced hyperglycemia and diabetes mellitus  :: continue insulin Lantus with insulin NovoLog sliding scale    #HTN  # A flutter with RVR/SVT  ::  Has recently completed zio patch.  :: Continue PTA metoprolol 50 mg BID    # Acute  blood loss anemia secondary to pyloric ulcer on top of chronic anemia of chronic disease, all are present on admission  -Continue pantoprazole 40 mg twice daily  -Repeat endoscopy for October 2022    #Acute metabolic encephalopathy secondary to urinary tract infection, not clear if present on admission, ordered a total of 5-day course of ceftriaxone for which the patient encephalopathy resolved     # extra hepatic and intrahepatic biliary dilation c/f hemobilia   # Intra ductal papillary mucinous neoplasm   :: s/p ERCP 8/11 showed hemobilia.   :: Monitor LFTs      #Acute on chronic heart failure with preserved ejection fraction LVEF 65%, the patient was appropriately diuresed, follow-up as outpatient         Diet: NPO per Anesthesia Guidelines for Procedure/Surgery Except for: Meds  Adult Formula Drip Feeding: Continuous Nepro with Carbsteady; Jejunostomy; Goal Rate: 40 ml/hr--okay to begin at goal once new formula arrives on unit.; mL/hr; Medication - Feeding Tube Flush Frequency: At least 15-30 mL water before and after med...  Snacks/Supplements Adult: Nepro Oral Supplement; Between Meals    DVT Prophylaxis: Heparin SQ  Dong Catheter: Not present  Central Lines: PRESENT  CVC Double Lumen Right Internal jugular Tunneled-Site Assessment: WDL except  Cardiac Monitoring: None  Code Status: Full Code      Disposition Plan     Expected Discharge Date: 10/03/2022,  9:00 AM      Discharge Comments: Decision will need to be made as the patient would need dialysis after discharge or not.  The patient received dialysis on 9/27 and 9/28 while awaiting improvement of her kidney function.  Hopefully the patient would not need dialysis upon discharge. TBD -staying at the [facility] for another several months, so [employer]ott would probably work. She will need transportation.      The patient's care was discussed with the Patient, Patient's Family and Pulmonary Consultant.    Greg Mujica MD  Hospitalist Service,  GOLD TEAM 10  [facility]  Securely message with the Vocera Web Console (learn more here)  Text page via [facility] Paging/Directory   Please see signed in provider for up to date coverage information      Clinically Significant Risk Factors Present on Admission                      ______________________________________________________________________    Interval History   The patient reports that she felt okay las nite  Nausea still present but much better  Wants to do GE study  Says wants chest tube out, planning out later today  Says no CP no SOB no FCS  Four-point review of systems is otherwise negative.    Data reviewed today: I reviewed all medications, new labs and imaging results over the last 24 hours.     Physical Exam   Vital Signs: Temp: 98.7  F (37.1  C) Temp src: Oral BP: 123/66 Pulse: 93   Resp: 16 SpO2: 100 % O2 Device: None (Room air) Oxygen Delivery: 1 LPM  Weight: 158 lbs 4.8 oz  EXAM  General: frail appearing woman sitting up in chair, NAD  Head: NC, AT  Eye: symm gaze, anicteric sclerae  ENT: patent nares wo drainage/epistaxis, MMM  Pulm: CTAB, comfortable WOB on RA; chest tube secure in place  CV: normal rate, reg rhythm; CVC site wo erythema, mildly tender to palp;  GI: soft, NTND  Neuro: awake, alert, hearing speech and phonation, intact grossly           Data   Recent Labs   Lab 09/30/22  1703 09/30/22  1020 09/30/22  0610 09/29/22  0944 09/29/22  0603 09/28/22  0809 09/28/22  0514 09/27/22  0950 09/27/22  0511 09/26/22  0913 09/26/22  0555   WBC  --   --   --   --   --   --  10.5  --  8.4  --  8.5   HGB  --   --   --   --   --   --  8.4*  --  7.9*  --  8.2*   MCV  --   --   --   --   --   --  102*  --  105*  --  103*   PLT  --   --   --   --   --   --  231  --  217  --  224   NA  --   --  134*  --  133*  --  133*  --  135*  --  137   POTASSIUM  --   --  4.4  --  4.1  --  4.2  --  4.1  --  4.9   CHLORIDE  --   --  95*  --  94*  --  95*  --  91*  --   87*   CO2  --   --  31*  --  31*  --  32*  --  39*  --  44*   BUN  --   --  39.0*  --  22.9  --  39.2*  --  63.4*  --  96.7*   CR  --   --  2.09*  --  1.60*  --  1.44*  --  1.48*  --  1.72*   ANIONGAP  --   --  8  --  8  --  6*  --  5*  --  6*   ESTUARDO  --   --  9.1  --  8.8  --  8.9  --  9.0  --  9.4   * 144* 127*   < > 129*   < > 143*   < > 173*   < > 148*   ALBUMIN  --   --  3.0*  --  3.0*  --  2.9*  --  3.1*  --  3.1*   PROTTOTAL  --   --  5.1*  --  5.3*  --  5.1*  --  5.2*  --  5.4*   BILITOTAL  --   --  0.2  --  0.2  --  0.2  --  0.2  --  0.2   ALKPHOS  --   --  71  --  87  --  86  --  85  --  79   ALT  --   --  12  --  16  --  13  --  12  --  11   AST  --   --  19  --  21  --  20  --  21  --  25    < > = values in this interval not displayed.     Recent Results (from the past 24 hour(s))   XR Chest 2 Views    Narrative    XR CHEST 2 VIEWS  9/30/2022 7:53 AM     HISTORY:  interval follow up, lung transplant, left chest tube removal        COMPARISON:  CT 9/27/2022, radiograph 9/27/2022    FINDINGS:   Frontal and lateral views of the chest. Interval removal of the left  chest tube. Right IJ central venous catheter tip projects over right  atrium. Clamshell sternotomy wires. Aortic arch atherosclerosis.  Trachea is midline. Cardiac silhouette is within normal limits.  Continued left perihilar opacity. Loculated fluid in the right major  fissure. Unchanged small left pleural effusion with associated  atelectasis. Degenerative changes at the visualized spine.      Impression    IMPRESSION: Interval removal of the left chest tube without  appreciable pneumothorax. Stable small left pleural effusion.    I have personally reviewed the examination and initial interpretation  and I agree with the findings.    JOHANN MORAES MD         SYSTEM ID:  [id number]   NM Gastric Emptying    Narrative    EXAM:  NM GASTRIC EMPTYING, 9/30/2022 12:32 PM    HISTORY: severe delayed gastric emptying for follow up; is off  dialysis.  NPO weds night    TECHNIQUE: The patient ingested 2.3 mCi of Tc-99m sulfur colloid in in  2 eggs, 1 slices of toast with jelly, and a glass of water. Static  images were acquired at approximately 1 hour intervals out through 4  hours using a dual head gamma camera in an anterior and posterior  position. The calculation of emptying was based on dual head imaging  with geometric mean calculations.  A Linear square fit was calculated  to the emptying curve to determine the amount of residual activity at  each time point.    FINDINGS:   Percent retention at 60 min = 97%.  Percent retention at 120 min = 95%.  Percent retention at 180 min = 91%.  Percent retention at 240 min = 91%.    T 1/2 emptying time =  Too long to calculate.      Impression    IMPRESSION: Severe delayed gastric emptying (see normal ranges below).    =====================  Reference Range:  Gastric emptying  - 30 minutes: <70% of retention (> 30% emptying) suggests abnormally     fast emptying.  - 60 minutes: <90% retention (>10% emptying) is normal; less than 30%     retention (>70% emptying) suggests abnormally rapid emptying.  - 90 minutes: <65% retention (> 35% emptying) is normal.  - 120 minutes: <60% retention (> 40% emptying) is normal.  - 180 minutes: <30% retention (> 70% emptying) is normal.  - 180 minutes: <30% retention (> 70% emptying) is normal.  - 240 minutes: <10% retention (> 90% emptying) is normal.    Gastric emptying T-1/2:  Solid: The normal range is  minutes  Liquid only: Normal range is 10-45 minutes.  Liquid only-children: At 60 minutes, normal range is 44-58 % .  Liquid only-infants: At 60 minutes, normal range is 32-64 %.      I have personally reviewed the examination and initial interpretation  and I agree with the findings.    IVANIA ALVARADO MD         SYSTEM ID:  [id number]     Medications     dextrose       - MEDICATION INSTRUCTIONS -       - MEDICATION INSTRUCTIONS -         azaTHIOprine  100 mg Per J Tube Daily      B and C vitamin Complex with folic acid  5 mL Oral Daily     calcium carbonate 600 mg-vitamin D 400 units  1 tablet Per J Tube BID w/meals     cyanocobalamin  500 mcg Per Feeding Tube Daily     dapsone  50 mg Per J Tube Q Mon Wed Fri AM     fiber modular (NUTRISOURCE FIBER)  1 packet Per Feeding Tube BID     folic acid  1 mg Oral or Feeding Tube Daily     gabapentin  200 mg Oral At Bedtime     sodium chloride (PF) 0.9%  1.3-2.6 mL Intracatheter Once    Followed by     heparin  3 mL Intracatheter Once     sodium chloride (PF) 0.9%  1.3-2.6 mL Intracatheter Once    Followed by     heparin  3 mL Intracatheter Once     heparin ANTICOAGULANT  5,000 Units Subcutaneous Q12H     insulin aspart  1-6 Units Subcutaneous Q6H     [Held by provider] insulin glargine  6 Units Subcutaneous QAM AC     lidocaine  2 patch Transdermal Q24h    And     lidocaine   Transdermal Q8H TONY     menthol  1 patch Topical QAM    And     menthol   Transdermal Q8H     menthol   Transdermal Daily     metoprolol  12.5 mg Per J Tube BID     nystatin  1,000,000 Units Swish & Swallow 4x Daily     pantoprazole  40 mg Per J Tube BID     prednisoLONE  10 mg Per J Tube Daily     prednisoLONE  7.5 mg Per J Tube QPM     sodium chloride (PF)  3 mL Intracatheter Q8H     tacrolimus  4 mg Per J Tube QPM     tacrolimus  4.5 mg Per J Tube QAM     **a portion of my previous note is copied and pasted above, it has been edited as needed to reflect events for today, 9-29-22, when I saw the patient, it is a late entry note created 9-30-22**

## 2022-10-01 NOTE — PLAN OF CARE
Tube feed at goal at 40 mL/hr. Pt tolerating without c/o nausea.PEG clamped entire shift. Pt ate small amount of ice cream without abdominal discomfort.

## 2022-10-01 NOTE — PROGRESS NOTES
Pulmonary Medicine  Cystic Fibrosis - Lung Transplant Team  Progress Note  10/01/2022       Patient: Sofie Rodriguez  MRN: [id number]  : 1960 (age 62 year old)  Transplant: 2022 (Lung), POD#95  Admission date: 2022    Assessment & Plan:     Sofie Rodriguez is a 62-year-old female s/p BSLT (22) for COPD complicated by persistent bilateral pleural effusions, persistent CO2 retention, right hemidiaphragm palsy, BACILIO (dialysis -), C. diff colitis, gastroparesis s/p GJ tube placement (22), GI bleed 2/2 pyloric ulcer, hemobilia s/p ERCP and MRCP (), and persistent vasoplegia.  Other history notable for HFpEF, NTM colonoization, hepatitis C, and methamphetamine use.  Patient admitted 22 with persistent dyspnea (increased with activity), daily morning hemoptysis, and increased BLE edema.  Also noted to have persistent, increased bilateral pleural effusions, diffuse bilateral groundglass changes, and more focal appearing LLL infiltrates on imaging.  Treating with aggressive diuresis with some improvement in symptoms and hypoxia.  Hemoptysis resolved, mild intermittent hypoxia and ANAYA persisted.  S/p bilateral chest tubes.  Episode of AMS now resolved.  S/p tunneled line placed and iHD initiation ().        Today's recommendations:  - Exercise and overnight oximetry study 24-48h prior to discharge  - VBG ordered 10/3 for monitoring  - Tacrolimus level ordered 10/3  - Prednisone taper due 10/13 (not yet ordered)  - CMV, DSA, and EBV ordered 10/5  - IgG 10/29 (not yet ordered)  - Titrate loperamide to 2-3 stools daily (without urgency)   - G tube to gravity drainage prn with GI symptoms (N/V, bloating, reflux); full liquid diet for comfort only  - Repeat gastric emptying study  unfortunately with persistent severe retention (91% at 4h) and some fullness and nausea post study; will need to defer adjustments to current plan and consider semi-permanent status as is  - Repeat EGD  10/13 to check healing of ulcer, sooner if hgb declines further     S/p bilateral sequential lung transplant (BSLT) for end stage COPD:  Acute hypoxia with chronic hypercapnia:   Pulmonary edema:  Persistent bibasilar pleural effusions:   Right hemidiaphragm palsy: Admitted with increased dyspnea with minimal exertion and small volume daily hemoptysis.  Also with marked decline in PFTs (FEV1 1.22L, 50% on 8/18 to 0.98L, 40% on 9/7).  Chest CT (9/8) with increased effusions and groundglass changes.  DSA positive but stable (as below).  BNP markedly elevated (30k) and also with increased BLE edema.  Etiology unclear and is likely multi-factorial with DDx including pulmonary edema from hypervolemia/progressive HFpEF, rejection (ACR +/- AMR), alveolar hemorrhage, and/or infection.  Aggressively diuresed with some improvement in symptoms.  Bronch (9/13) unremarkable, BAL of lingula sent, cultures no growth (GPC on gram stain only).  S/p right pigtail chest tube placement (9/13-9/22), transudative with moderate output initially but quickly decreasing, cultures negative.  S/p aspiration of left pleural effusion (9/13) and then pigtail chest tube (9/15-9/29) s/p full lytic course (916-9/19), cultures also negative, clamped 9/26.  Chest CT (9/27) with grossly unchanged small bilateral hydroPTX with left chest tube coiled in inferior medial left pleural space, continued resolution of nodular opacity in posterior MARLON, and the lobes of the left upper and lower are rotated with respect to each other (discussed at transplant conference 9/29, no intervention).  Weaned to RA at rest on 9/24, using 1L NC overnight.  VBG with improved hypercapnia 9/29.  Repeat CXR (9/30) with stable small left pleural effusion.  - Supplemental O2 to keep >92%, exercise and overnight oximetry study 24-48h prior to discharge  - Continue to defer NIPPV, repeat VBG prn with change in mentation/clinical status and 10/3 for monitoring (ordered)  - IS and  Aerobika q1h w/a and encourage OOB during the day as much as able  - Pulmonary follow up 1-2 weeks from discharge (not yet scheduled)     Immunosuppression:  - Tacrolimus 4.5 mg qAM / 4 mg qPM (increased 9/19).  Goal level 8-12.  Repeat level 10/3 (ordered).  - Azathioprine 100 mg daily (9/20, MMF stopped due to ongoing diarrhea)  - Prednisone 10 mg qAM / 7.5 mg qPM with next taper due 10/13 (not yet ordered)  Date AM dose (mg) PM dose (mg)   9/15/22 10 7.5   10/13/22 7.5 7.5   11/10/22 7.5 5   12/8/22 5 5   1/5/23 5 2.5      Prophylaxis:   - Dapsone qMWF for PJP ppx (resumed 9/19, monitor for worsening anemia; Bactrim stopped d/t hyperkalemia, will need to monitor for recurrence of anemia with dapsone; Pentamidine neb not tolerated on 9/7 after only 5 minutes d/t excessive coughing)  - Completed VGCV for CMV ppx 9/26 (through POD#90), D+/R+, CMV monitoring every other week (starting 10/5, ordered)      Positive DSA: Newly positive on 8/10 with DQB2 mfi 2155.   - Monitoring a2ushpf, next due 10/5 (ordered), see below for trend:  Date DQB2 Notes   8/10 2155     9/1 7033 Current peak   9/21 5390 Most recent       EBV viremia: Low level (1374 on 9/7), not likely to be clinically significant.  - Follow EBV monthly (ordered 10/5)     Hypogammaglobulinemia: IgG adequate at time of transplant, repeat level low (336) on 7/28.  S/p IVIG 7/30, tolerated well.  IgG on 9/29 low at 559 but not indicating IVIG replacement.   - Follow IgG monthly (next due 10/29, not yet ordered)     Other relevant problems being managed by the primary team:     Encephalopathy, Resolved:  Tremors:   Presumed UTI: Noted 9/19 with confusion as well as tremor.  DDx including hypercapnia, hyperammonia, infection, uremia (see below), medication related.  VBG with worsening hypercapnia (99).  BUN elevated (112).  Ammonia normal (24).  CRP normal, procal 0.1.  UA with moderate blood, large leukocyte esterase, but UC (9/19) negative.  Blood culture (9/19)  negative.  Tacro not supratherapeutic.  Head CT (9/20) without acute intracranial pathology.  AMS resolved ~9/22.  S/p ceftriaxone 5-day course (9/20-9/24).     ESRD based upon Cystatin C (GFR of 10): Nephrology consulted 9/21, see their note for details, with concern for CKD5 as Cr may suggest overestimation of kidney function with limited muscle mass, unclear if trend over the past week reflects BACILIO.  Cystatin C 4.3 with GFR 10.  Making urine.  Renal US (9/22) normal.  S/p tunneled line placed and HD initiated 9/26.  Management per nephrology.     Diarrhea: C diff negative on 9/10 and 9/20.  Likely medication related (MMF), but unable to transition to Myfortic given NPO.  Loperamide utilized with some benefit.   Enteric stool panel negative 9/16.  Transitioned from MMF to AZA as above.   - Goal to titrate to antidiarrheal 3 stools daily (without urgency), management per primary.     Severe gastroparesis:   Pyloric ulcer: Gastric emptying study 7/20 with severe gastric emptying delay (95% retention at 4 hours), s/p GJ tube placement in IR 7/27.  S/p EGD (8/3) noted to have pyloric ulcer and excessive gastric fluid and residual food.  S/p EGD (8/11) with mild erythema 2/2 GJ tube trauma seen near insertion site but no active bleeding.  - PPI BID  - TF continuous and ALL enteral medications via J tube  - G tube to gravity drainage prn with GI symptoms (N/V, bloating, reflux); full liquid diet for comfort only  - Repeat gastric emptying study 9/30 unfortunately with persistent severe retention (91% at 4h) and some fullness and nausea post study; will need to defer adjustments to current plan and consider semi-permanent status as is  - Repeat EGD 10/13 to check healing of ulcer, sooner if hgb declines further    We appreciate the excellent care provided by the [place] [id number] team.  Recommendations communicated via in person rounding and this note.  Will continue to follow along closely, please do not hesitate to call  "with any questions or concerns.    Patient discussed with Dr. Gong.    Yuliet Aviles PA-C  Inpatient [facility]  Pulmonary CF/Transplant     Subjective & Interval History:     Remains on RA during the day, 1L NC overnight.  ANAYA although attributes somewhat to deconditioning.  No new cough or sputum.  G tube intermittently vented for nausea and fullness yesterday, feeling a little better today.  Had 3 \"mushy\" stools yesterday.  HD with 1L removed yesterday.  BLE edema improving.    Review of Systems:     C: No fever, no chills, + decreased weight  INTEGUMENTARY/SKIN: No rash or obvious new lesions  ENT/MOUTH: No sore throat, no sinus pain, no nasal congestion or drainage  RESP: See interval history  CV: No chest pain, no palpitations  GI: No vomiting, no reflux symptoms  : No dysuria, + decreased urine output  MUSCULOSKELETAL: + lower back pain  ENDOCRINE: Blood sugars with adequate control  NEURO: No headache, no numbness or tingling  PSYCHIATRIC: Mood stable    Physical Exam:     All notes, images, and labs from past 24 hours (at minimum) were reviewed.    Vital signs:  Temp: 97.9  F (36.6  C) Temp src: Oral BP: 128/70 Pulse: 94   Resp: 18 SpO2: 100 % O2 Device: Nasal cannula with humidification Oxygen Delivery: 1 LPM Height: 158.8 cm (5' 2.5\") Weight: 70.7 kg (155 lb 14.4 oz)  I/O:     Intake/Output Summary (Last 24 hours) at 10/1/2022 0951  Last data filed at 10/1/2022 0900  Gross per 24 hour   Intake 1020 ml   Output 1925 ml   Net -905 ml     Constitutional: Sitting on edge of bed, in no apparent distress.   HEENT: Eyes with pink conjunctivae, anicteric.  Oral mucosa moist without lesions.    PULM: Diminished air flow to left > right base.  No crackles, no rhonchi, no wheezes.  Non-labored breathing on RA.  CV: Normal S1 and S2.  RRR.  + systolic murmur.  No gallop or rub.  1+ BLE edema.   ABD: NABS, soft, nontender, nondistended.  PEG/J tube site not visualized.  MSK: Moves all extremities.    NEURO: Alert, " conversant.   SKIN: Warm, dry.  No rash on limited exam.   PSYCH: Mood stable.     Lines, Drains, and Devices:  Peripheral IV 09/10/22 Anterior;Left Lower forearm (Active)   Site Assessment WDL 10/01/22 0800   Line Status Saline locked 10/01/22 0800   Dressing Intervention New dressing  09/10/22 0135   Phlebitis Scale 0-->no symptoms 10/01/22 0800   Infiltration Scale 0 10/01/22 0800   Number of days: 21       CVC Double Lumen Right Internal jugular Tunneled (Active)   Site Assessment [facility] except 10/01/22 0800   Dressing Type Chlorhexidine disk;Transparent 09/30/22 1615   Dressing Status dry;intact 09/30/22 1615   Dressing Change Due 10/03/22 09/30/22 1615   Line Necessity yes, meets criteria 09/30/22 1615   Blue - Status saline locked;cap changed 09/30/22 1615   Blue - Cap Change Due 10/05/22 09/28/22 1153   Red - Status saline locked;cap changed 09/30/22 1615   Red - Cap Change Due 10/05/22 09/28/22 1153   CVC Comment HD line 09/30/22 1615   Number of days: 5     Data:     LABS    CMP:   Recent Labs   Lab 10/01/22  0645 10/01/22  0302 09/30/22  2139 09/30/22  1703 09/30/22  1020 09/30/22  0610 09/29/22  0944 09/29/22  0603 09/28/22  0809 09/28/22  0514 09/27/22  0950 09/27/22  0511 09/26/22  0913 09/26/22  0555 09/25/22  0933 09/25/22  0736   *  --   --   --   --  134*  --  133*  --  133*  --  135*  --  137   < >  --    POTASSIUM 4.7  --   --   --   --  4.4  --  4.1  --  4.2  --  4.1  --  4.9   < >  --    CHLORIDE 95*  --   --   --   --  95*  --  94*  --  95*  --  91*  --  87*   < >  --    CO2 33*  --   --   --   --  31*  --  31*  --  32*  --  39*  --  44*   < >  --    ANIONGAP 4*  --   --   --   --  8  --  8  --  6*  --  5*  --  6*   < >  --    * 155* 168* 176*   < > 127*   < > 129*   < > 143*   < > 173*   < > 148*   < >  --    BUN 24.8*  --   --   --   --  39.0*  --  22.9  --  39.2*  --  63.4*  --  96.7*   < >  --    CR 1.71*  --   --   --   --  2.09*  --  1.60*  --  1.44*  --  1.48*  --  1.72*   < >   --    GFRESTIMATED 34*  --   --   --   --  26*  --  37*  --  41*  --  40*  --  33*   < >  --    ESTUARDO 8.8  --   --   --   --  9.1  --  8.8  --  8.9  --  9.0  --  9.4   < >  --    MAG  --   --   --   --   --   --   --   --   --  2.0  --  2.3  --  2.7*  --  2.7*   PHOS  --   --   --   --   --   --   --   --   --  3.2  --  3.5  --  3.7  --   --    PROTTOTAL 5.1*  --   --   --   --  5.1*  --  5.3*  --  5.1*  --  5.2*  --  5.4*   < >  --    ALBUMIN 3.0*  --   --   --   --  3.0*  --  3.0*  --  2.9*  --  3.1*  --  3.1*   < >  --    BILITOTAL 0.2  --   --   --   --  0.2  --  0.2  --  0.2  --  0.2  --  0.2   < >  --    ALKPHOS 80  --   --   --   --  71  --  87  --  86  --  85  --  79   < >  --    AST 17  --   --   --   --  19  --  21  --  20  --  21  --  25   < >  --    ALT 9*  --   --   --   --  12  --  16  --  13  --  12  --  11   < >  --     < > = values in this interval not displayed.     CBC:   Recent Labs   Lab 09/28/22  0514 09/27/22  0511 09/26/22  0555   WBC 10.5 8.4 8.5   RBC 2.65* 2.51* 2.65*   HGB 8.4* 7.9* 8.2*   HCT 27.0* 26.3* 27.4*   * 105* 103*   MCH 31.7 31.5 30.9   MCHC 31.1* 30.0* 29.9*   RDW 19.8* 19.9* 19.9*    217 224       INR: No lab results found in last 7 days.    Glucose:   Recent Labs   Lab 10/01/22  0645 10/01/22  0302 09/30/22  2139 09/30/22  1703 09/30/22  1020 09/30/22  0610   * 155* 168* 176* 144* 127*       Blood Gas:   Recent Labs   Lab 09/29/22  0603   PHV 7.36   PCO2V 60*   PO2V 48*   HCO3V 34*   LINDY 6.7*   O2PER 0       Culture Data No results for input(s): CULT in the last 168 hours.    Virology Data:   Lab Results   Component Value Date    FLUAH1 Not Detected 09/13/2022    FLUAH3 Not Detected 09/13/2022    FW5029 Not Detected 09/13/2022    IFLUB Not Detected 09/13/2022    RSVA Not Detected 09/13/2022    RSVB Not Detected 09/13/2022    PIV1 Not Detected 09/13/2022    PIV2 Not Detected 09/13/2022    PIV3 Not Detected 09/13/2022    HMPV Not Detected 09/13/2022        Historical CMV results (last 3 of prior testing):  Lab Results   Component Value Date    CMVQNT Not Detected 09/30/2022    CMVQNT Not Detected 09/13/2022    CMVQNT Not Detected 09/07/2022     No results found for: CMVLOG    Urine Studies    Recent Labs   Lab Test 09/19/22  2254 08/01/22  0319 07/08/22  0831   URINEPH 7.0 8.0* 5.5   NITRITE Negative Negative Negative   LEUKEST Large* Negative Negative   WBCU 29*  --  1       Most Recent [place] Pulmonary Function Testing (FVC/FEV1 only)  FVC-Pre   Date Value Ref Range Status   09/07/2022 1.01 L    09/01/2022 1.07 L    08/24/2022 1.23 L    08/18/2022 1.33 L      FVC-%Pred-Pre   Date Value Ref Range Status   09/07/2022 33 %    09/01/2022 35 %    08/24/2022 40 %    08/18/2022 43 %      FEV1-Pre   Date Value Ref Range Status   09/07/2022 0.98 L    09/01/2022 1.02 L    08/24/2022 1.17 L    08/18/2022 1.22 L      FEV1-%Pred-Pre   Date Value Ref Range Status   09/07/2022 40 %    09/01/2022 42 %    08/24/2022 48 %    08/18/2022 50 %        IMAGING    Recent Results (from the past 48 hour(s))   XR Chest 2 Views    Narrative    XR CHEST 2 VIEWS  9/30/2022 7:53 AM     HISTORY:  interval follow up, lung transplant, left chest tube removal        COMPARISON:  CT 9/27/2022, radiograph 9/27/2022    FINDINGS:   Frontal and lateral views of the chest. Interval removal of the left  chest tube. Right IJ central venous catheter tip projects over right  atrium. Clamshell sternotomy wires. Aortic arch atherosclerosis.  Trachea is midline. Cardiac silhouette is within normal limits.  Continued left perihilar opacity. Loculated fluid in the right major  fissure. Unchanged small left pleural effusion with associated  atelectasis. Degenerative changes at the visualized spine.      Impression    IMPRESSION: Interval removal of the left chest tube without  appreciable pneumothorax. Stable small left pleural effusion.    I have personally reviewed the examination and initial  interpretation  and I agree with the findings.    JOHANN MORAES MD         SYSTEM ID:  [id number]   NM Gastric Emptying    Narrative    EXAM:  NM GASTRIC EMPTYING, 9/30/2022 12:32 PM    HISTORY: severe delayed gastric emptying for follow up; is off  dialysis. NPO weds night    TECHNIQUE: The patient ingested 2.3 mCi of Tc-99m sulfur colloid in in  2 eggs, 1 slices of toast with jelly, and a glass of water. Static  images were acquired at approximately 1 hour intervals out through 4  hours using a dual head gamma camera in an anterior and posterior  position. The calculation of emptying was based on dual head imaging  with geometric mean calculations.  A Linear square fit was calculated  to the emptying curve to determine the amount of residual activity at  each time point.    FINDINGS:   Percent retention at 60 min = 97%.  Percent retention at 120 min = 95%.  Percent retention at 180 min = 91%.  Percent retention at 240 min = 91%.    T 1/2 emptying time =  Too long to calculate.      Impression    IMPRESSION: Severe delayed gastric emptying (see normal ranges below).    =====================  Reference Range:  Gastric emptying  - 30 minutes: <70% of retention (> 30% emptying) suggests abnormally     fast emptying.  - 60 minutes: <90% retention (>10% emptying) is normal; less than 30%     retention (>70% emptying) suggests abnormally rapid emptying.  - 90 minutes: <65% retention (> 35% emptying) is normal.  - 120 minutes: <60% retention (> 40% emptying) is normal.  - 180 minutes: <30% retention (> 70% emptying) is normal.  - 180 minutes: <30% retention (> 70% emptying) is normal.  - 240 minutes: <10% retention (> 90% emptying) is normal.    Gastric emptying T-1/2:  Solid: The normal range is  minutes  Liquid only: Normal range is 10-45 minutes.  Liquid only-children: At 60 minutes, normal range is 44-58 % .  Liquid only-infants: At 60 minutes, normal range is 32-64 %.      I have personally reviewed the  examination and initial interpretation  and I agree with the findings.    IVANIA ALVARADO MD         SYSTEM ID:  [id number]

## 2022-10-01 NOTE — PROGRESS NOTES
[facility]    Medicine Progress Note - Hospitalist Service, GOLD TEAM 10    Date of Admission:  9/9/2022    Assessment & Plan        62 year old female with pertinent hx of end stage COPD s/p bilateral sequential lung transplant (6/22) on triple IS ( MMF/Tacro/pred), pyloric ulcer, recent ERCP c/f hemobilia, gastroparesis s/p GJ tube placement and Percutaneous J tube presents for a planned admission from transplant pulmonology to work up antibody medicated rejection versus infection.     Today's updates  -- gastric emptying study done 9-30-22  -- will d/w Renal possibly pulling fluid off during HD  -- dispo pending HD chair    #Acute kidney injury AKIN stage II on CKD stage IIIb, all are present on admission  :: Patient has been variable GFRs 30-50s in the last few months. Most recent Cr trend 1.5-1.9 1.56 9/8/22 cystatin C obtained and GFR ~10; discussed with Renal team; ultimately decision to give trial of iHD and monitor for renal recovery  :: trend BMP strict input and output and daily body weight  :: Renal consulted, cont iHD for now; uneventful placement of tunneled catheter by IR on 9/26. first dialysis on 9/26.  Plan for dialysis on 9/27 and 9/28 then to assess for the need for further dialysis afterwards.   :: Vitamin D level -25;  parathyroid hormone = 46; c/w Calcium and vit D      #Diarrhea likely secondary to tube feeding, not present on admission, improved  :: repeatedly ruled out for C. difficile colitis.   :: c/w fibers at 28 g and Imodium as needed.     #Acute hypoxic hypercarbic respiratory failure secondary to bibasilar pleural effusion currently with chest tubes bilaterally on top of end-stage COPD s/p bilateral sequential lung transplant on 6/22/2022 complicated by chronic respiratory acidosis with compensation by metabolic alkalosis, all are present on admission   This is the main problem that led to this admission.  The patient has bilateral chest  tubes.  The patient had her right-sided chest tube removed on 9/22/2022.  -Left-sided chest tube is clamped with chest x-ray to be obtained on 9/27 for consideration of removal of left-sided chest tube  -DSA testing on 10/5/2022  -IgG level ordered for 9/29  -Vibha-Barr virus quantitative was ordered for 10/7/2022  -Continue 3 drug immunosuppression with azathioprine, prednisone, and tacrolimus  -Prednisone taper to be performed on 10/13/2022  -Continue oxycodone at 5 mg every 4 hours as needed for chest pain related to left-sided chest tube  -The patient declined any further lidocaine patches since 2 patches of lidocaine daily did not help with the pain  -Started Robaxin as needed for adjunct therapy for pain management  -Increased gabapentin to 200 mg nightly  -Continue BiPAP as detailed above  -Continue valganciclovir for cytomegalovirus prophylaxis through 9/28/2022 -- renally dosed for eGFR  -Continue dapsone for PJP prophylaxis  -Continue folic acid while on dapsone  -Continue nystatin.  Prophylaxis protocol post lung transplant  -I highly appreciate input of transplant pulmonology service        #Severe gastroparesis s/p  GJ tube placement 7/27/2022  #Significant delayed gastric emptying s/p gastrojejunostomy tube placement, present on admission  We will continue to utilize jejunal tube for nutrition.  I ordered a follow-up nuclear medicine gastric emptying study for evaluation of any hopefully improvement in the patient's gastric emptying  :: repeat GE study 9/30/22, no major change  :: RD nutrition consult placed for TF  :: Percutaneous j tube in place with G venting to gravity      #Steroid-induced hyperglycemia and diabetes mellitus  :: continue insulin Lantus with insulin NovoLog sliding scale    #HTN  # A flutter with RVR/SVT  ::  Has recently completed zio patch.  :: Continue PTA metoprolol 50 mg BID    # Acute blood loss anemia secondary to pyloric ulcer on top of chronic anemia of chronic disease,  all are present on admission  -Continue pantoprazole 40 mg twice daily  -Repeat endoscopy  10/13 to check healing of ulcer, sooner if hgb declines further    #Acute metabolic encephalopathy secondary to urinary tract infection, not clear if present on admission, ordered a total of 5-day course of ceftriaxone for which the patient encephalopathy resolved     # extra hepatic and intrahepatic biliary dilation c/f hemobilia   # Intra ductal papillary mucinous neoplasm   :: s/p ERCP 8/11 showed hemobilia.   :: Monitor LFTs      #Acute on chronic heart failure with preserved ejection fraction LVEF 65%, the patient was appropriately diuresed, follow-up as outpatient         Diet: NPO per Anesthesia Guidelines for Procedure/Surgery Except for: Meds  Adult Formula Drip Feeding: Continuous Nepro with Carbsteady; Jejunostomy; Goal Rate: 40 ml/hr--okay to begin at goal once new formula arrives on unit.; mL/hr; Medication - Feeding Tube Flush Frequency: At least 15-30 mL water before and after med...  Snacks/Supplements Adult: Nepro Oral Supplement; Between Meals    DVT Prophylaxis: Heparin SQ  Dong Catheter: Not present  Central Lines: PRESENT  CVC Double Lumen Right Internal jugular Tunneled-Site Assessment: WDL except  Cardiac Monitoring: None  Code Status: Full Code      Disposition Plan     Expected Discharge Date: 10/03/2022,  9:00 AM      Discharge Comments: Decision will need to be made as the patient would need dialysis after discharge or not.  The patient received dialysis on 9/27 and 9/28 while awaiting improvement of her kidney function.  Hopefully the patient would not need dialysis upon discharge. TBD -staying at the [facility] for another several months, so [facility] would probably work. She will need transportation.      The patient's care was discussed with the Patient, Patient's Family and Pulmonary Consultant.    Greg Mujica MD  Hospitalist Service, GOLD TEAM 10  [facility]  [facility]  Securely message with the Vocera Web Console (learn more here)  Text page via [facility] Paging/Directory   Please see signed in provider for up to date coverage information      Clinically Significant Risk Factors Present on Admission                      ______________________________________________________________________    Interval History   Seen on HD today, doing well, reading book, feeling ok  ON ELENO  Says no CP no SOB no FCS  Four-point review of systems is otherwise negative.    Data reviewed today: I reviewed all medications, new labs and imaging results over the last 24 hours.     Physical Exam   Vital Signs: Temp: 98.7  F (37.1  C) Temp src: Oral BP: 123/66 Pulse: 93   Resp: 16 SpO2: 100 % O2 Device: None (Room air) Oxygen Delivery: 1 LPM  Weight: 158 lbs 4.8 oz  EXAM  General: frail appearing woman sitting up in bed, NAD  Head: NC, AT  Eye: symm gaze, anicteric sclerae  ENT: patent nares wo drainage/epistaxis, MMM  Pulm: CTAB, comfortable WOB on RA; chest tube secure in place  CV: normal rate, reg rhythm;   Neuro: awake, alert, hearing speech and phonation, intact grossly           Data   Recent Labs   Lab 09/30/22  1703 09/30/22  1020 09/30/22  0610 09/29/22  0944 09/29/22  0603 09/28/22  0809 09/28/22  0514 09/27/22  0950 09/27/22  0511 09/26/22  0913 09/26/22  0555   WBC  --   --   --   --   --   --  10.5  --  8.4  --  8.5   HGB  --   --   --   --   --   --  8.4*  --  7.9*  --  8.2*   MCV  --   --   --   --   --   --  102*  --  105*  --  103*   PLT  --   --   --   --   --   --  231  --  217  --  224   NA  --   --  134*  --  133*  --  133*  --  135*  --  137   POTASSIUM  --   --  4.4  --  4.1  --  4.2  --  4.1  --  4.9   CHLORIDE  --   --  95*  --  94*  --  95*  --  91*  --  87*   CO2  --   --  31*  --  31*  --  32*  --  39*  --  44*   BUN  --   --  39.0*  --  22.9  --  39.2*  --  63.4*  --  96.7*   CR  --   --  2.09*  --  1.60*  --  1.44*  --  1.48*  --  1.72*   ANIONGAP  --    --  8  --  8  --  6*  --  5*  --  6*   ESTUARDO  --   --  9.1  --  8.8  --  8.9  --  9.0  --  9.4   * 144* 127*   < > 129*   < > 143*   < > 173*   < > 148*   ALBUMIN  --   --  3.0*  --  3.0*  --  2.9*  --  3.1*  --  3.1*   PROTTOTAL  --   --  5.1*  --  5.3*  --  5.1*  --  5.2*  --  5.4*   BILITOTAL  --   --  0.2  --  0.2  --  0.2  --  0.2  --  0.2   ALKPHOS  --   --  71  --  87  --  86  --  85  --  79   ALT  --   --  12  --  16  --  13  --  12  --  11   AST  --   --  19  --  21  --  20  --  21  --  25    < > = values in this interval not displayed.     Recent Results (from the past 24 hour(s))   XR Chest 2 Views    Narrative    XR CHEST 2 VIEWS  9/30/2022 7:53 AM     HISTORY:  interval follow up, lung transplant, left chest tube removal        COMPARISON:  CT 9/27/2022, radiograph 9/27/2022    FINDINGS:   Frontal and lateral views of the chest. Interval removal of the left  chest tube. Right IJ central venous catheter tip projects over right  atrium. Clamshell sternotomy wires. Aortic arch atherosclerosis.  Trachea is midline. Cardiac silhouette is within normal limits.  Continued left perihilar opacity. Loculated fluid in the right major  fissure. Unchanged small left pleural effusion with associated  atelectasis. Degenerative changes at the visualized spine.      Impression    IMPRESSION: Interval removal of the left chest tube without  appreciable pneumothorax. Stable small left pleural effusion.    I have personally reviewed the examination and initial interpretation  and I agree with the findings.    JOHANN MORAES MD         SYSTEM ID:  [id number]   NM Gastric Emptying    Narrative    EXAM:  NM GASTRIC EMPTYING, 9/30/2022 12:32 PM    HISTORY: severe delayed gastric emptying for follow up; is off  dialysis. NPO weds night    TECHNIQUE: The patient ingested 2.3 mCi of Tc-99m sulfur colloid in in  2 eggs, 1 slices of toast with jelly, and a glass of water. Static  images were acquired at approximately 1 hour  intervals out through 4  hours using a dual head gamma camera in an anterior and posterior  position. The calculation of emptying was based on dual head imaging  with geometric mean calculations.  A Linear square fit was calculated  to the emptying curve to determine the amount of residual activity at  each time point.    FINDINGS:   Percent retention at 60 min = 97%.  Percent retention at 120 min = 95%.  Percent retention at 180 min = 91%.  Percent retention at 240 min = 91%.    T 1/2 emptying time =  Too long to calculate.      Impression    IMPRESSION: Severe delayed gastric emptying (see normal ranges below).    =====================  Reference Range:  Gastric emptying  - 30 minutes: <70% of retention (> 30% emptying) suggests abnormally     fast emptying.  - 60 minutes: <90% retention (>10% emptying) is normal; less than 30%     retention (>70% emptying) suggests abnormally rapid emptying.  - 90 minutes: <65% retention (> 35% emptying) is normal.  - 120 minutes: <60% retention (> 40% emptying) is normal.  - 180 minutes: <30% retention (> 70% emptying) is normal.  - 180 minutes: <30% retention (> 70% emptying) is normal.  - 240 minutes: <10% retention (> 90% emptying) is normal.    Gastric emptying T-1/2:  Solid: The normal range is  minutes  Liquid only: Normal range is 10-45 minutes.  Liquid only-children: At 60 minutes, normal range is 44-58 % .  Liquid only-infants: At 60 minutes, normal range is 32-64 %.      I have personally reviewed the examination and initial interpretation  and I agree with the findings.    IVANIA ALVARADO MD         SYSTEM ID:  [id number]     Medications     dextrose       - MEDICATION INSTRUCTIONS -       - MEDICATION INSTRUCTIONS -         azaTHIOprine  100 mg Per J Tube Daily     B and C vitamin Complex with folic acid  5 mL Oral Daily     calcium carbonate 600 mg-vitamin D 400 units  1 tablet Per J Tube BID w/meals     cyanocobalamin  500 mcg Per Feeding Tube Daily     dapsone   50 mg Per J Tube Q Mon Wed Fri AM     fiber modular (NUTRISOURCE FIBER)  1 packet Per Feeding Tube BID     folic acid  1 mg Oral or Feeding Tube Daily     gabapentin  200 mg Oral At Bedtime     sodium chloride (PF) 0.9%  1.3-2.6 mL Intracatheter Once    Followed by     heparin  3 mL Intracatheter Once     sodium chloride (PF) 0.9%  1.3-2.6 mL Intracatheter Once    Followed by     heparin  3 mL Intracatheter Once     heparin ANTICOAGULANT  5,000 Units Subcutaneous Q12H     insulin aspart  1-6 Units Subcutaneous Q6H     [Held by provider] insulin glargine  6 Units Subcutaneous QAM AC     lidocaine  2 patch Transdermal Q24h    And     lidocaine   Transdermal Q8H TONY     menthol  1 patch Topical QAM    And     menthol   Transdermal Q8H     menthol   Transdermal Daily     metoprolol  12.5 mg Per J Tube BID     nystatin  1,000,000 Units Swish & Swallow 4x Daily     pantoprazole  40 mg Per J Tube BID     prednisoLONE  10 mg Per J Tube Daily     prednisoLONE  7.5 mg Per J Tube QPM     sodium chloride (PF)  3 mL Intracatheter Q8H     tacrolimus  4 mg Per J Tube QPM     tacrolimus  4.5 mg Per J Tube QAM     **a portion of my previous note is copied and pasted above, it has been edited as needed to reflect events for today**

## 2022-10-02 ENCOUNTER — APPOINTMENT (OUTPATIENT)
Dept: OCCUPATIONAL THERAPY | Facility: CLINIC | Age: 62
DRG: 205 | End: 2022-10-02
Attending: INTERNAL MEDICINE
Payer: MEDICARE

## 2022-10-02 LAB
ALBUMIN SERPL BCG-MCNC: 3 G/DL (ref 3.5–5.2)
ALP SERPL-CCNC: 86 U/L (ref 35–104)
ALT SERPL W P-5'-P-CCNC: 12 U/L (ref 10–35)
ANION GAP SERPL CALCULATED.3IONS-SCNC: 6 MMOL/L (ref 7–15)
AST SERPL W P-5'-P-CCNC: 18 U/L (ref 10–35)
BILIRUB SERPL-MCNC: 0.2 MG/DL
BUN SERPL-MCNC: 41.3 MG/DL (ref 8–23)
CALCIUM SERPL-MCNC: 8.9 MG/DL (ref 8.8–10.2)
CHLORIDE SERPL-SCNC: 96 MMOL/L (ref 98–107)
CREAT SERPL-MCNC: 2.25 MG/DL (ref 0.51–0.95)
DEPRECATED HCO3 PLAS-SCNC: 34 MMOL/L (ref 22–29)
ERYTHROCYTE [DISTWIDTH] IN BLOOD BY AUTOMATED COUNT: 20.1 % (ref 10–15)
GFR SERPL CREATININE-BSD FRML MDRD: 24 ML/MIN/1.73M2
GLUCOSE BLDC GLUCOMTR-MCNC: 136 MG/DL (ref 70–99)
GLUCOSE BLDC GLUCOMTR-MCNC: 140 MG/DL (ref 70–99)
GLUCOSE BLDC GLUCOMTR-MCNC: 155 MG/DL (ref 70–99)
GLUCOSE BLDC GLUCOMTR-MCNC: 164 MG/DL (ref 70–99)
GLUCOSE BLDC GLUCOMTR-MCNC: 173 MG/DL (ref 70–99)
GLUCOSE SERPL-MCNC: 137 MG/DL (ref 70–99)
HCT VFR BLD AUTO: 26.6 % (ref 35–47)
HGB BLD-MCNC: 8.4 G/DL (ref 11.7–15.7)
MAGNESIUM SERPL-MCNC: 2 MG/DL (ref 1.7–2.3)
MCH RBC QN AUTO: 32.7 PG (ref 26.5–33)
MCHC RBC AUTO-ENTMCNC: 31.6 G/DL (ref 31.5–36.5)
MCV RBC AUTO: 104 FL (ref 78–100)
PLATELET # BLD AUTO: 285 10E3/UL (ref 150–450)
POTASSIUM SERPL-SCNC: 4.2 MMOL/L (ref 3.4–5.3)
PROT SERPL-MCNC: 5 G/DL (ref 6.4–8.3)
RBC # BLD AUTO: 2.57 10E6/UL (ref 3.8–5.2)
SODIUM SERPL-SCNC: 136 MMOL/L (ref 136–145)
WBC # BLD AUTO: 7.8 10E3/UL (ref 4–11)

## 2022-10-02 PROCEDURE — 97530 THERAPEUTIC ACTIVITIES: CPT | Mod: GO | Performed by: OCCUPATIONAL THERAPIST

## 2022-10-02 PROCEDURE — 99233 SBSQ HOSP IP/OBS HIGH 50: CPT | Performed by: PHYSICIAN ASSISTANT

## 2022-10-02 PROCEDURE — 36415 COLL VENOUS BLD VENIPUNCTURE: CPT | Performed by: PHYSICIAN ASSISTANT

## 2022-10-02 PROCEDURE — 250N000013 HC RX MED GY IP 250 OP 250 PS 637: Performed by: INTERNAL MEDICINE

## 2022-10-02 PROCEDURE — 97110 THERAPEUTIC EXERCISES: CPT | Mod: GO | Performed by: OCCUPATIONAL THERAPIST

## 2022-10-02 PROCEDURE — 99232 SBSQ HOSP IP/OBS MODERATE 35: CPT | Performed by: PEDIATRICS

## 2022-10-02 PROCEDURE — 214N000001 HC R&B CCU UMMC

## 2022-10-02 PROCEDURE — 250N000009 HC RX 250: Performed by: INTERNAL MEDICINE

## 2022-10-02 PROCEDURE — 250N000012 HC RX MED GY IP 250 OP 636 PS 637: Performed by: NURSE PRACTITIONER

## 2022-10-02 PROCEDURE — 250N000013 HC RX MED GY IP 250 OP 250 PS 637: Performed by: STUDENT IN AN ORGANIZED HEALTH CARE EDUCATION/TRAINING PROGRAM

## 2022-10-02 PROCEDURE — 250N000011 HC RX IP 250 OP 636: Performed by: STUDENT IN AN ORGANIZED HEALTH CARE EDUCATION/TRAINING PROGRAM

## 2022-10-02 PROCEDURE — 250N000013 HC RX MED GY IP 250 OP 250 PS 637

## 2022-10-02 PROCEDURE — 85027 COMPLETE CBC AUTOMATED: CPT | Performed by: PHYSICIAN ASSISTANT

## 2022-10-02 PROCEDURE — 83735 ASSAY OF MAGNESIUM: CPT | Performed by: PHYSICIAN ASSISTANT

## 2022-10-02 PROCEDURE — 80053 COMPREHEN METABOLIC PANEL: CPT

## 2022-10-02 PROCEDURE — 250N000013 HC RX MED GY IP 250 OP 250 PS 637: Performed by: NURSE PRACTITIONER

## 2022-10-02 RX ADMIN — NYSTATIN 1000000 UNITS: 100000 SUSPENSION ORAL at 12:08

## 2022-10-02 RX ADMIN — TACROLIMUS 4 MG: 5 CAPSULE ORAL at 18:00

## 2022-10-02 RX ADMIN — NYSTATIN 1000000 UNITS: 100000 SUSPENSION ORAL at 20:47

## 2022-10-02 RX ADMIN — Medication 1 PACKET: at 20:50

## 2022-10-02 RX ADMIN — INSULIN ASPART 1 UNITS: 100 INJECTION, SOLUTION INTRAVENOUS; SUBCUTANEOUS at 16:10

## 2022-10-02 RX ADMIN — TACROLIMUS 4.5 MG: 5 CAPSULE ORAL at 08:16

## 2022-10-02 RX ADMIN — ORAL VEHICLES - SUSP 12.5 MG: SUSPENSION at 08:58

## 2022-10-02 RX ADMIN — LOPERAMIDE HYDROCHLORIDE 2 MG: 2 CAPSULE ORAL at 11:07

## 2022-10-02 RX ADMIN — GABAPENTIN 200 MG: 250 SOLUTION ORAL at 20:49

## 2022-10-02 RX ADMIN — LOPERAMIDE HYDROCHLORIDE 2 MG: 2 CAPSULE ORAL at 20:50

## 2022-10-02 RX ADMIN — Medication 40 MG: at 08:58

## 2022-10-02 RX ADMIN — NYSTATIN 1000000 UNITS: 100000 SUSPENSION ORAL at 16:03

## 2022-10-02 RX ADMIN — Medication 100 MG: at 08:58

## 2022-10-02 RX ADMIN — PREDNISOLONE 10 MG: 15 SOLUTION ORAL at 08:58

## 2022-10-02 RX ADMIN — NYSTATIN 1000000 UNITS: 100000 SUSPENSION ORAL at 08:58

## 2022-10-02 RX ADMIN — INSULIN ASPART 1 UNITS: 100 INJECTION, SOLUTION INTRAVENOUS; SUBCUTANEOUS at 04:34

## 2022-10-02 RX ADMIN — OXYCODONE HYDROCHLORIDE 5 MG: 5 SOLUTION ORAL at 20:49

## 2022-10-02 RX ADMIN — CALCIUM CARBONATE 600 MG (1,500 MG)-VITAMIN D3 400 UNIT TABLET 1 TABLET: at 17:08

## 2022-10-02 RX ADMIN — Medication 40 MG: at 20:49

## 2022-10-02 RX ADMIN — Medication 5 ML: at 08:58

## 2022-10-02 RX ADMIN — HEPARIN SODIUM 5000 UNITS: 5000 INJECTION, SOLUTION INTRAVENOUS; SUBCUTANEOUS at 20:49

## 2022-10-02 RX ADMIN — PREDNISOLONE 7.5 MG: 15 SOLUTION ORAL at 20:49

## 2022-10-02 RX ADMIN — INSULIN ASPART 1 UNITS: 100 INJECTION, SOLUTION INTRAVENOUS; SUBCUTANEOUS at 10:32

## 2022-10-02 RX ADMIN — ORAL VEHICLES - SUSP 12.5 MG: SUSPENSION at 20:49

## 2022-10-02 RX ADMIN — CALCIUM CARBONATE 600 MG (1,500 MG)-VITAMIN D3 400 UNIT TABLET 1 TABLET: at 08:58

## 2022-10-02 RX ADMIN — CYANOCOBALAMIN TAB 500 MCG 500 MCG: 500 TAB at 08:58

## 2022-10-02 RX ADMIN — Medication 1 PACKET: at 10:18

## 2022-10-02 RX ADMIN — OXYCODONE HYDROCHLORIDE 5 MG: 5 SOLUTION ORAL at 04:34

## 2022-10-02 RX ADMIN — HEPARIN SODIUM 5000 UNITS: 5000 INJECTION, SOLUTION INTRAVENOUS; SUBCUTANEOUS at 08:59

## 2022-10-02 RX ADMIN — INSULIN ASPART 1 UNITS: 100 INJECTION, SOLUTION INTRAVENOUS; SUBCUTANEOUS at 22:46

## 2022-10-02 ASSESSMENT — ACTIVITIES OF DAILY LIVING (ADL)
ADLS_ACUITY_SCORE: 31

## 2022-10-02 NOTE — PLAN OF CARE
Neuro: A/Ox4.  Cardiac: No tele. HR 80's. VSS.   Respiratory: O2 sats stable on 1L NC 99%. LS diminished in bases.   GI/: Voiding small amounts. On HD. No BM overnight, had diarrhea at 1730. Had imodium x 1  Diet/appetite: Full liquids, no nausea. TF at 40cc/hr  Activity:  UAL  Pain: Medicated for back pain with oxycodone.   Skin: Intact, no new deficits noted.  LDA's: piv sl'd, R HD line CDI.    Plan: Continue with POC. Notify primary team with changes.

## 2022-10-02 NOTE — PROGRESS NOTES
[facility]    Medicine Progress Note - Hospitalist Service, GOLD TEAM 10    Date of Admission:  9/9/2022    Assessment & Plan        62 year old female with pertinent hx of end stage COPD s/p bilateral sequential lung transplant (6/22) on triple IS ( MMF/Tacro/pred), pyloric ulcer, recent ERCP c/f hemobilia, gastroparesis s/p GJ tube placement and Percutaneous J tube presents for a planned admission from transplant pulmonology to work up antibody medicated rejection versus infection.     Today's updates  -- no changes  -- dispo pending HD chair  -- discharge items:  - Exercise and overnight oximetry study 24-48h prior to discharge  - Prednisone taper due 10/13 (not yet ordered)  - Repeat gastric emptying study (last done 9-30-22 had persistent severe retention, 91% at 4h)    - Repeat EGD 10/13 to check healing of ulcer, sooner if hgb declines further    #Acute kidney injury AKIN stage II on CKD stage IIIb (POA)-->ESRD on HD  :: Patient had been variable GFRs 30-50s in the last few months. Most recent Cr trend 1.5-1.9 1.56 9/8/22 cystatin C obtained and GFR ~10  :: Renal consulted, cont iHD for now; uneventful placement of tunneled catheter by IR on 9/26   :: trend BMP strict input and output and daily body weight  :: Vitamin D level -25;  parathyroid hormone = 46; c/w Calcium and vit D      #Diarrhea likely secondary to tube feeding, not present on admission, improved  :: repeatedly ruled out for C. difficile colitis.   :: c/w fibers at 28 g and Imodium as needed.     #Acute hypoxic hypercarbic respiratory failure secondary to bibasilar pleural effusion currently with chest tubes bilaterally on top of end-stage COPD s/p bilateral sequential lung transplant on 6/22/2022 complicated by chronic respiratory acidosis with compensation by metabolic alkalosis, all are present on admission   This is the main problem that led to this admission, had bilateral chest tubes; patient  had her right-sided chest tube removed on 9/22/2022 and removal of Left-sided chest tube 9/29/2022   -DSA testing on 10/5/2022  -IgG level ordered for 9/29  -Vibha-Barr virus quantitative was ordered for 10/7/2022  -Continue 3 drug immunosuppression with azathioprine, prednisone, and tacrolimus  -Prednisone taper to be performed on 10/13/2022  -Continue oxycodone a prn pain  -Increased gabapentin to 200 mg nightly  -Continue BiPAP as detailed above  -Continue valganciclovir for cytomegalovirus prophylaxis through 9/28/2022 -- renally dosed for eGFR  -Continue dapsone for PJP prophylaxis  -Continue folic acid while on dapsone  -Continue nystatin.  Prophylaxis protocol post lung transplant  -consulted transplant pulmonology service        #Severe gastroparesis s/p  GJ tube placement 7/27/2022  #Significant delayed gastric emptying s/p gastrojejunostomy tube placement, present on admission  We will continue to utilize jejunal tube for nutrition.  I ordered a follow-up nuclear medicine gastric emptying study for evaluation of any hopefully improvement in the patient's gastric emptying  :: repeat GE study 9/30/22, no major change  :: RD nutrition consult placed for TF  :: Percutaneous j tube in place with G venting to gravity      #Steroid-induced hyperglycemia and diabetes mellitus  :: continue insulin Lantus with insulin NovoLog sliding scale    #HTN  # A flutter with RVR/SVT  ::  Has recently completed zio patch.  :: Continue PTA metoprolol 50 mg BID    # Acute blood loss anemia secondary to pyloric ulcer on top of chronic anemia of chronic disease, all are present on admission  -Continue pantoprazole 40 mg twice daily  -Repeat endoscopy  10/13 to check healing of ulcer, sooner if hgb declines further    #Acute metabolic encephalopathy secondary to urinary tract infection, not clear if present on admission, ordered a total of 5-day course of ceftriaxone for which the patient encephalopathy resolved     # extra  hepatic and intrahepatic biliary dilation c/f hemobilia   # Intra ductal papillary mucinous neoplasm   :: planning for ERCP on 10/7  :: s/p ERCP 8/11 showed hemobilia.   :: Monitor LFTs      #Acute on chronic heart failure with preserved ejection fraction LVEF 65%, the patient was appropriately diuresed, follow-up as outpatient         Diet: Adult Formula Drip Feeding: Continuous Nepro with Carbsteady; Jejunostomy; Goal Rate: 40 ml/hr--okay to begin at goal once new formula arrives on unit.; mL/hr; Medication - Feeding Tube Flush Frequency: At least 15-30 mL water before and after med...  Snacks/Supplements Adult: Nepro Oral Supplement; Between Meals  Full Liquid Diet    DVT Prophylaxis: Heparin SQ  Dong Catheter: Not present  Central Lines: PRESENT  CVC Double Lumen Right Internal jugular Tunneled-Site Assessment: WDL except (old blood)  Cardiac Monitoring: None  Code Status: Full Code      Disposition Plan     Expected Discharge Date: 10/03/2022,  9:00 AM      Discharge Comments: Decision will need to be made as the patient would need dialysis after discharge or not.  The patient received dialysis on 9/27 and 9/28 while awaiting improvement of her kidney function.  Hopefully the patient would not need dialysis upon discharge. TBD -staying at the [facility] for another several months, so [employer] would probably work. She will need transportation.      The patient's care was discussed with the Patient, Patient's Family and Pulmonary Consultant.    Greg Mujica MD  Hospitalist Service, [address]  Securely message with the Vocera Web Console (learn more here)  Text page via [facility] Paging/Directory   Please see signed in provider for up to date coverage information      Clinically Significant Risk Factors Present on Admission                      ______________________________________________________________________    Interval History   Seen  on rounds today, doing well, up to chiar and watching marathon  Minimal nausea and taking boost suppl well (has to sip slowly)  Says no CP no SOB no FCS  Four-point review of systems is otherwise negative.    Data reviewed today: I reviewed all medications, new labs and imaging results over the last 24 hours.     Physical Exam   Vital Signs: Temp: 97.7  F (36.5  C) Temp src: Oral BP: 126/71 Pulse: 92   Resp: 18 SpO2: 95 % O2 Device: Nasal cannula with humidification Oxygen Delivery: 1 LPM  Weight: 155 lbs 14.4 oz  EXAM  General: frail appearing woman sitting up in [facility]  Head: NC, AT  Eye: symm gaze, anicteric sclerae  ENT: patent nares wo drainage/epistaxis, MMM  Pulm: CTAB, comfortable WOB on RA   CV: normal rate, reg rhythm;   Neuro: awake, alert, hearing speech and phonation, intact grossly           Data   Recent Labs   Lab 10/02/22  1017 10/02/22  0824 10/02/22  0638 10/01/22  1021 10/01/22  0645 09/30/22  1020 09/30/22  0610 09/28/22  0809 09/28/22  0514 09/27/22  0950 09/27/22  0511   WBC  --   --  7.8  --   --   --   --   --  10.5  --  8.4   HGB  --   --  8.4*  --   --   --   --   --  8.4*  --  7.9*   MCV  --   --  104*  --   --   --   --   --  102*  --  105*   PLT  --   --  285  --   --   --   --   --  231  --  217   NA  --   --  136  --  132*  --  134*   < > 133*  --  135*   POTASSIUM  --   --  4.2  --  4.7  --  4.4   < > 4.2  --  4.1   CHLORIDE  --   --  96*  --  95*  --  95*   < > 95*  --  91*   CO2  --   --  34*  --  33*  --  31*   < > 32*  --  39*   BUN  --   --  41.3*  --  24.8*  --  39.0*   < > 39.2*  --  63.4*   CR  --   --  2.25*  --  1.71*  --  2.09*   < > 1.44*  --  1.48*   ANIONGAP  --   --  6*  --  4*  --  8   < > 6*  --  5*   ESTUARDO  --   --  8.9  --  8.8  --  9.1   < > 8.9  --  9.0   * 136* 137*   < > 132*   < > 127*   < > 143*   < > 173*   ALBUMIN  --   --  3.0*  --  3.0*  --  3.0*   < > 2.9*  --  3.1*   PROTTOTAL  --   --  5.0*  --  5.1*  --  5.1*   < > 5.1*  --  5.2*   BILITOTAL  --    --  0.2  --  0.2  --  0.2   < > 0.2  --  0.2   ALKPHOS  --   --  86  --  80  --  71   < > 86  --  85   ALT  --   --  12  --  9*  --  12   < > 13  --  12   AST  --   --  18  --  17  --  19   < > 20  --  21    < > = values in this interval not displayed.     No results found for this or any previous visit (from the past 24 hour(s)).  Medications     dextrose       - MEDICATION INSTRUCTIONS -       - MEDICATION INSTRUCTIONS -         azaTHIOprine  100 mg Per J Tube Daily     B and C vitamin Complex with folic acid  5 mL Oral Daily     calcium carbonate 600 mg-vitamin D 400 units  1 tablet Per J Tube BID w/meals     cyanocobalamin  500 mcg Per Feeding Tube Daily     dapsone  50 mg Per J Tube Q Mon Wed Fri AM     fiber modular (NUTRISOURCE FIBER)  1 packet Per Feeding Tube BID     [Held by provider] folic acid  1 mg Oral or Feeding Tube Daily     gabapentin  200 mg Oral At Bedtime     heparin ANTICOAGULANT  5,000 Units Subcutaneous Q12H     insulin aspart  1-6 Units Subcutaneous Q6H     [Held by provider] insulin glargine  6 Units Subcutaneous QAM AC     lidocaine  2 patch Transdermal Q24h    And     lidocaine   Transdermal Q8H TONY     menthol  1 patch Topical QAM    And     menthol   Transdermal Q8H     menthol   Transdermal Daily     metoprolol  12.5 mg Per J Tube BID     nystatin  1,000,000 Units Swish & Swallow 4x Daily     pantoprazole  40 mg Per J Tube BID     prednisoLONE  10 mg Per J Tube Daily     prednisoLONE  7.5 mg Per J Tube QPM     sodium chloride (PF)  3 mL Intracatheter Q8H     tacrolimus  4 mg Per J Tube QPM     tacrolimus  4.5 mg Per J Tube QAM     **a portion of my previous note is copied and pasted above, it has been edited as needed to reflect events for today**

## 2022-10-02 NOTE — PROGRESS NOTES
Pulmonary Medicine  Cystic Fibrosis - Lung Transplant Team  Progress Note  10/02/2022       Patient: Sofie Rodriguez  MRN: [id number]  : 1960 (age 62 year old)  Transplant: 2022 (Lung), POD#96  Admission date: 2022    Assessment & Plan:     Sofie Rodriguez is a 62-year-old female s/p BSLT (22) for COPD complicated by persistent bilateral pleural effusions, persistent CO2 retention, right hemidiaphragm palsy, BACILIO (dialysis -), C. diff colitis, gastroparesis s/p GJ tube placement (22), GI bleed 2/2 pyloric ulcer, hemobilia s/p ERCP and MRCP (), and persistent vasoplegia.  Other history notable for HFpEF, NTM colonoization, hepatitis C, and methamphetamine use.  Patient admitted 22 with persistent dyspnea (increased with activity), daily morning hemoptysis, and increased BLE edema.  Also noted to have persistent, increased bilateral pleural effusions, diffuse bilateral groundglass changes, and more focal appearing LLL infiltrates on imaging.  Treating with aggressive diuresis with some improvement in symptoms and hypoxia.  Hemoptysis resolved, mild intermittent hypoxia and ANAYA persisted.  S/p bilateral chest tubes.  Episode of AMS now resolved.  S/p tunneled line placed and iHD initiation ().  Anticipate discharge early-mid week pending dialysis plan.      Today's recommendations:  - Exercise and overnight oximetry study 24-48h prior to discharge  - VBG ordered tomorrow for monitoring  - Tacrolimus level ordered tomorrow  - Prednisone taper due 10/13 (not yet ordered)  - CMV, DSA, and EBV ordered 10/5  - IgG 10/29 (not yet ordered)  - Titrate loperamide to 2-3 stools daily (without urgency)   - G tube to gravity drainage prn with GI symptoms (N/V, bloating, reflux); full liquid diet for comfort only  - EGD currently scheduled 10/13 to check healing of ulcer  - ERCP currently scheduled 10/7 (primary team to discuss with GI pending dispo plan)     S/p bilateral sequential  lung transplant (BSLT) for end stage COPD:  Acute hypoxia with chronic hypercapnia:   Pulmonary edema:  Persistent bibasilar pleural effusions:   Right hemidiaphragm palsy: Admitted with increased dyspnea with minimal exertion and small volume daily hemoptysis.  Also with marked decline in PFTs (FEV1 1.22L, 50% on 8/18 to 0.98L, 40% on 9/7).  Chest CT (9/8) with increased effusions and groundglass changes.  DSA positive but stable (as below).  BNP markedly elevated (30k) and also with increased BLE edema.  Etiology unclear and is likely multi-factorial with DDx including pulmonary edema from hypervolemia/progressive HFpEF, rejection (ACR +/- AMR), alveolar hemorrhage, and/or infection.  Aggressively diuresed with some improvement in symptoms.  Bronch (9/13) unremarkable, BAL of lingula sent, cultures no growth (GPC on gram stain only).  S/p right pigtail chest tube placement (9/13-9/22), transudative with moderate output initially but quickly decreasing, cultures negative.  S/p aspiration of left pleural effusion (9/13) and then pigtail chest tube (9/15-9/29) s/p full lytic course (916-9/19), cultures also negative, clamped 9/26.  Chest CT (9/27) with grossly unchanged small bilateral hydroPTX with left chest tube coiled in inferior medial left pleural space, continued resolution of nodular opacity in posterior MARLON, and the lobes of the left upper and lower are rotated with respect to each other (discussed at transplant conference 9/29, no intervention).  Weaned to RA at rest on 9/24, using 1L NC overnight.  VBG with improved hypercapnia 9/29.  Repeat CXR (9/30) with stable small left pleural effusion.  - Supplemental O2 to keep >92%, exercise and overnight oximetry study 24-48h prior to discharge  - Continue to defer NIPPV, repeat VBG prn with change in mentation/clinical status and 10/3 for monitoring (ordered)  - IS and Aerobika q1h w/a and encourage OOB during the day as much as able  - Pulmonary follow up 1-2  weeks from discharge (not yet scheduled, RN CC awaiting dialysis schedule)     Immunosuppression:  - Tacrolimus 4.5 mg qAM / 4 mg qPM (increased 9/19).  Goal level 8-12.  Repeat level 10/3 (ordered).  - Azathioprine 100 mg daily (9/20, MMF stopped due to ongoing diarrhea)  - Prednisone 10 mg qAM / 7.5 mg qPM with next taper due 10/13 (not yet ordered)  Date AM dose (mg) PM dose (mg)   9/15/22 10 7.5   10/13/22 7.5 7.5   11/10/22 7.5 5   12/8/22 5 5   1/5/23 5 2.5      Prophylaxis:   - Dapsone qMWF for PJP ppx (resumed 9/19, monitor for worsening anemia; Bactrim stopped d/t hyperkalemia, will need to monitor for recurrence of anemia with dapsone; Pentamidine neb not tolerated on 9/7 after only 5 minutes d/t excessive coughing)  - Completed VGCV for CMV ppx 9/26 (through POD#90), D+/R+, CMV monitoring every other week (starting 10/5, ordered)      Positive DSA: Newly positive on 8/10 with DQB2 mfi 2155.   - Monitoring [id number], next due 10/5 (ordered), see below for trend:  Date DQB2 Notes   8/10 2155     9/1 7033 Current peak   9/21 5390 Most recent       EBV viremia: Low level (1374 on 9/7), not likely to be clinically significant.  - Follow EBV monthly (ordered 10/5)     Hypogammaglobulinemia: IgG adequate at time of transplant, repeat level low (336) on 7/28.  S/p IVIG 7/30, tolerated well.  IgG on 9/29 low at 559 but not indicating IVIG replacement.   - Follow IgG monthly (next due 10/29, not yet ordered)     Other relevant problems being managed by the primary team:     Encephalopathy, Resolved:  Tremors:   Presumed UTI: Noted 9/19 with confusion as well as tremor.  DDx including hypercapnia, hyperammonia, infection, uremia (see below), medication related.  VBG with worsening hypercapnia (99).  BUN elevated (112).  Ammonia normal (24).  CRP normal, procal 0.1.  UA with moderate blood, large leukocyte esterase, but UC (9/19) negative.  Blood culture (9/19) negative.  Tacro not supratherapeutic.  Head CT (9/20)  without acute intracranial pathology.  AMS resolved ~9/22.  S/p ceftriaxone 5-day course (9/20-9/24).     ESRD based upon Cystatin C (GFR of 10): Nephrology consulted 9/21, see their note for details, with concern for CKD5 as Cr may suggest overestimation of kidney function with limited muscle mass, unclear if trend over the past week reflects BACILIO.  Cystatin C 4.3 with GFR 10.  Making urine.  Renal US (9/22) normal.  S/p tunneled line placed and HD initiated 9/26.  Management per nephrology.     Diarrhea: C diff negative on 9/10 and 9/20.  Likely medication related (MMF), but unable to transition to Myfortic given NPO.  Loperamide utilized with some benefit.   Enteric stool panel negative 9/16.  Transitioned from MMF to AZA as above.   - Goal to titrate to antidiarrheal 3 stools daily (without urgency), management per primary.      Severe gastroparesis:   Pyloric ulcer: Gastric emptying study 7/20 with severe gastric emptying delay (95% retention at 4 hours), s/p GJ tube placement in IR 7/27.  S/p EGD (8/3) noted to have pyloric ulcer and excessive gastric fluid and residual food.  S/p EGD (8/11) with mild erythema 2/2 GJ tube trauma seen near insertion site but no active bleeding.  Repeat gastric emptying study 9/30 unfortunately with persistent severe retention (91% at 4h) and some fullness and nausea post study; defer adjustments to current plan and consider semi-permanent status as is.  - PPI BID  - TF continuous and ALL enteral medications via J tube  - G tube to gravity drainage prn with GI symptoms (N/V, bloating, reflux); full liquid diet for comfort only  - Repeat EGD 10/13 to check healing of ulcer, sooner if hgb declines further    We appreciate the excellent care provided by the [place] [id number] team.  Recommendations communicated via in person rounding and this note.  Will continue to follow along closely, please do not hesitate to call with any questions or concerns.    Patient discussed with   "Gong.    Yuliet Aviles PA-C  Inpatient [facility]  Pulmonary CF/Transplant     Subjective & Interval History:     Remains on RA during the day and 1L NC overnight.  No change in mild ANAYA and nonproductive cough.  G tube mostly clamped during the day yesterday.  Denies nausea, fullness, or reflux symptoms.  Minimal PO intake otherwise on TF.  Had 3 loose stools yesterday, denies urgency.  Using prn Imodium.        Review of Systems:     C: No fever, no chills, no recent change in weight  INTEGUMENTARY/SKIN: No rash or obvious new lesions  ENT/MOUTH: No sore throat, no sinus pain, no nasal congestion or drainage  RESP: See interval history  CV: No chest pain, no palpitations, + improving peripheral edema  GI: No vomiting  : No dysuria, + decreased urine output  MUSCULOSKELETAL: + lower back pain  ENDOCRINE: Blood sugars with adequate control  NEURO: No headache, no numbness or tingling  PSYCHIATRIC: Mood stable    Physical Exam:     All notes, images, and labs from past 24 hours (at minimum) were reviewed.    Vital signs:  Temp: 98.8  F (37.1  C) Temp src: Oral BP: 118/64 Pulse: 92   Resp: 18 SpO2: 100 % O2 Device: None (Room air) Oxygen Delivery: 1 LPM Height: 158.8 cm (5' 2.5\") Weight: 70.7 kg (155 lb 14.4 oz)  I/O:     Intake/Output Summary (Last 24 hours) at 10/2/2022 0806  Last data filed at 10/2/2022 0500  Gross per 24 hour   Intake 1225 ml   Output 750 ml   Net 475 ml     Constitutional: Sitting up in bed, in no apparent distress.   HEENT: Eyes with pink conjunctivae, anicteric.  Oral mucosa moist without lesions.    PULM: Diminished air flow to left > right base.  Bibasilar crackles.  No rhonchi, no wheezes.  Non-labored breathing on RA.  CV: Normal S1 and S2.  RRR.  + systolic murmur.  No gallop or rub.  1+ BLE edema.   ABD: NABS, soft, nontender, nondistended.  GJ tube site not visualized.  MSK: Moves all extremities.    NEURO: Alert, conversant.   SKIN: Warm, dry.  No rash on limited exam.   PSYCH: Mood " stable.     Lines, Drains, and Devices:  Peripheral IV 09/10/22 Anterior;Left Lower forearm (Active)   Site Assessment WDL 10/01/22 2200   Line Status Saline locked 10/01/22 2200   Dressing Intervention New dressing  09/10/22 0135   Phlebitis Scale 0-->no symptoms 10/01/22 2200   Infiltration Scale 0 10/01/22 2200   Number of days: 22       CVC Double Lumen Right Internal jugular Tunneled (Active)   Site Assessment [facility] 10/01/22 2200   Dressing Type Chlorhexidine disk;Transparent 10/01/22 0800   Dressing Status clean;dry;intact 10/01/22 2200   Dressing Change Due 10/03/22 10/01/22 2200   Line Necessity yes, meets criteria 10/01/22 2200   Blue - Status saline locked;cap changed 09/30/22 1615   Blue - Cap Change Due 10/05/22 09/28/22 1153   Red - Status saline locked;cap changed 09/30/22 1615   Red - Cap Change Due 10/05/22 09/28/22 1153   CVC Comment HD line 10/01/22 2200   Number of days: 6     Data:     LABS    CMP:   Recent Labs   Lab 10/02/22  0638 10/02/22  0428 10/01/22  2106 10/01/22  1601 10/01/22  1021 10/01/22  0645 09/30/22  1020 09/30/22  0610 09/29/22  0944 09/29/22  0603 09/28/22  0809 09/28/22  0514 09/27/22  0950 09/27/22  0511 09/26/22  0913 09/26/22  0555     --   --   --   --  132*  --  134*  --  133*  --  133*  --  135*  --  137   POTASSIUM 4.2  --   --   --   --  4.7  --  4.4  --  4.1  --  4.2  --  4.1  --  4.9   CHLORIDE 96*  --   --   --   --  95*  --  95*  --  94*  --  95*  --  91*  --  87*   CO2 34*  --   --   --   --  33*  --  31*  --  31*  --  32*  --  39*  --  44*   ANIONGAP 6*  --   --   --   --  4*  --  8  --  8  --  6*  --  5*  --  6*   * 164* 139* 189*   < > 132*   < > 127*   < > 129*   < > 143*   < > 173*   < > 148*   BUN 41.3*  --   --   --   --  24.8*  --  39.0*  --  22.9  --  39.2*  --  63.4*  --  96.7*   CR 2.25*  --   --   --   --  1.71*  --  2.09*  --  1.60*  --  1.44*  --  1.48*  --  1.72*   GFRESTIMATED 24*  --   --   --   --  34*  --  26*  --  37*  --  41*  --   40*  --  33*   ESTUARDO 8.9  --   --   --   --  8.8  --  9.1  --  8.8  --  8.9  --  9.0  --  9.4   MAG 2.0  --   --   --   --   --   --   --   --   --   --  2.0  --  2.3  --  2.7*   PHOS  --   --   --   --   --   --   --   --   --   --   --  3.2  --  3.5  --  3.7   PROTTOTAL 5.0*  --   --   --   --  5.1*  --  5.1*  --  5.3*  --  5.1*  --  5.2*  --  5.4*   ALBUMIN 3.0*  --   --   --   --  3.0*  --  3.0*  --  3.0*  --  2.9*  --  3.1*  --  3.1*   BILITOTAL 0.2  --   --   --   --  0.2  --  0.2  --  0.2  --  0.2  --  0.2  --  0.2   ALKPHOS 86  --   --   --   --  80  --  71  --  87  --  86  --  85  --  79   AST 18  --   --   --   --  17  --  19  --  21  --  20  --  21  --  25   ALT 12  --   --   --   --  9*  --  12  --  16  --  13  --  12  --  11    < > = values in this interval not displayed.     CBC:   Recent Labs   Lab 09/28/22  0514 09/27/22  0511 09/26/22  0555   WBC 10.5 8.4 8.5   RBC 2.65* 2.51* 2.65*   HGB 8.4* 7.9* 8.2*   HCT 27.0* 26.3* 27.4*   * 105* 103*   MCH 31.7 31.5 30.9   MCHC 31.1* 30.0* 29.9*   RDW 19.8* 19.9* 19.9*    217 224       INR: No lab results found in last 7 days.    Glucose:   Recent Labs   Lab 10/02/22  0638 10/02/22  0428 10/01/22  2106 10/01/22  1601 10/01/22  1021 10/01/22  0645   * 164* 139* 189* 149* 132*       Blood Gas:   Recent Labs   Lab 09/29/22  0603   PHV 7.36   PCO2V 60*   PO2V 48*   HCO3V 34*   LINDY 6.7*   O2PER 0       Culture Data No results for input(s): CULT in the last 168 hours.    Virology Data:   Lab Results   Component Value Date    FLUAH1 Not Detected 09/13/2022    FLUAH3 Not Detected 09/13/2022    FH7751 Not Detected 09/13/2022    IFLUB Not Detected 09/13/2022    RSVA Not Detected 09/13/2022    RSVB Not Detected 09/13/2022    PIV1 Not Detected 09/13/2022    PIV2 Not Detected 09/13/2022    PIV3 Not Detected 09/13/2022    HMPV Not Detected 09/13/2022       Historical CMV results (last 3 of prior testing):  Lab Results   Component Value Date    CMVQNT Not  Detected 09/30/2022    CMVQNT Not Detected 09/13/2022    CMVQNT Not Detected 09/07/2022     No results found for: CMVLOG    Urine Studies    Recent Labs   Lab Test 09/19/22  2254 08/01/22  0319 07/08/22  0831   URINEPH 7.0 8.0* 5.5   NITRITE Negative Negative Negative   LEUKEST Large* Negative Negative   WBCU 29*  --  1       Most Recent [place] Pulmonary Function Testing (FVC/FEV1 only)  FVC-Pre   Date Value Ref Range Status   09/07/2022 1.01 L    09/01/2022 1.07 L    08/24/2022 1.23 L    08/18/2022 1.33 L      FVC-%Pred-Pre   Date Value Ref Range Status   09/07/2022 33 %    09/01/2022 35 %    08/24/2022 40 %    08/18/2022 43 %      FEV1-Pre   Date Value Ref Range Status   09/07/2022 0.98 L    09/01/2022 1.02 L    08/24/2022 1.17 L    08/18/2022 1.22 L      FEV1-%Pred-Pre   Date Value Ref Range Status   09/07/2022 40 %    09/01/2022 42 %    08/24/2022 48 %    08/18/2022 50 %        IMAGING    Recent Results (from the past 48 hour(s))   NM Gastric Emptying    Narrative    EXAM:  NM GASTRIC EMPTYING, 9/30/2022 12:32 PM    HISTORY: severe delayed gastric emptying for follow up; is off  dialysis. NPO weds night    TECHNIQUE: The patient ingested 2.3 mCi of Tc-99m sulfur colloid in in  2 eggs, 1 slices of toast with jelly, and a glass of water. Static  images were acquired at approximately 1 hour intervals out through 4  hours using a dual head gamma camera in an anterior and posterior  position. The calculation of emptying was based on dual head imaging  with geometric mean calculations.  A Linear square fit was calculated  to the emptying curve to determine the amount of residual activity at  each time point.    FINDINGS:   Percent retention at 60 min = 97%.  Percent retention at 120 min = 95%.  Percent retention at 180 min = 91%.  Percent retention at 240 min = 91%.    T 1/2 emptying time =  Too long to calculate.      Impression    IMPRESSION: Severe delayed gastric emptying (see normal ranges  below).    =====================  Reference Range:  Gastric emptying  - 30 minutes: <70% of retention (> 30% emptying) suggests abnormally     fast emptying.  - 60 minutes: <90% retention (>10% emptying) is normal; less than 30%     retention (>70% emptying) suggests abnormally rapid emptying.  - 90 minutes: <65% retention (> 35% emptying) is normal.  - 120 minutes: <60% retention (> 40% emptying) is normal.  - 180 minutes: <30% retention (> 70% emptying) is normal.  - 180 minutes: <30% retention (> 70% emptying) is normal.  - 240 minutes: <10% retention (> 90% emptying) is normal.    Gastric emptying T-1/2:  Solid: The normal range is  minutes  Liquid only: Normal range is 10-45 minutes.  Liquid only-children: At 60 minutes, normal range is 44-58 % .  Liquid only-infants: At 60 minutes, normal range is 32-64 %.      I have personally reviewed the examination and initial interpretation  and I agree with the findings.    IVANIA ALVARADO MD         SYSTEM ID:  [id number]

## 2022-10-02 NOTE — PLAN OF CARE
Temp: 97.7  F (36.5  C) Temp src: Oral BP: 126/71 Pulse: 92   Resp: 18 SpO2: 95 % O2 Device: Nasal cannula with humidification Oxygen Delivery: 1 LPM    Pt alert, oriented, VSS. Resp status stable, has 1L NC available as needed for SOB, but mainly using at night, room air during day. Up out of bed in room to bathroom, sitting up in chair. Loose stools x 2 today, immodium prn given. Voiding small amounts,usually mixed with stool. Pt ate small amount jello for comfort, TF continue at goal 40cc/hr. Plan for dialysis tomorrow at 0810am, put aware. No complaint of pain during day, back pain at night while in bed. Continue plan of care, assess for changes.      Goal Outcome Evaluation:    Plan of Care Reviewed With: patient     Overall Patient Progress: no change

## 2022-10-02 NOTE — PROGRESS NOTES
[facility]    Medicine Progress Note - Hospitalist Service, GOLD TEAM 10    Date of Admission:  9/9/2022    Assessment & Plan        62 year old female with pertinent hx of end stage COPD s/p bilateral sequential lung transplant (6/22) on triple IS ( MMF/Tacro/pred), pyloric ulcer, recent ERCP c/f hemobilia, gastroparesis s/p GJ tube placement and Percutaneous J tube presents for a planned admission from transplant pulmonology to work up antibody medicated rejection versus infection.     Today's updates  -- no changes  -- dispo pending HD chair  -- discharge items:  - Exercise and overnight oximetry study 24-48h prior to discharge  - Prednisone taper due 10/13 (not yet ordered)  - Repeat gastric emptying study (last done 9-30-22 had persistent severe retention, 91% at 4h)    - Repeat EGD 10/13 to check healing of ulcer, sooner if hgb declines further    #Acute kidney injury AKIN stage II on CKD stage IIIb (POA)-->ESRD on HD  :: Patient had been variable GFRs 30-50s in the last few months. Most recent Cr trend 1.5-1.9 1.56 9/8/22 cystatin C obtained and GFR ~10  :: Renal consulted, cont iHD for now; uneventful placement of tunneled catheter by IR on 9/26   :: trend BMP strict input and output and daily body weight  :: Vitamin D level -25;  parathyroid hormone = 46; c/w Calcium and vit D      #Diarrhea likely secondary to tube feeding, not present on admission, improved  :: repeatedly ruled out for C. difficile colitis.   :: c/w fibers at 28 g and Imodium as needed.     #Acute hypoxic hypercarbic respiratory failure secondary to bibasilar pleural effusion currently with chest tubes bilaterally on top of end-stage COPD s/p bilateral sequential lung transplant on 6/22/2022 complicated by chronic respiratory acidosis with compensation by metabolic alkalosis, all are present on admission   This is the main problem that led to this admission, had bilateral chest tubes; patient  had her right-sided chest tube removed on 9/22/2022 and removal of Left-sided chest tube 9/29/2022   -DSA testing on 10/5/2022  -IgG level ordered for 9/29  -Vibha-Barr virus quantitative was ordered for 10/7/2022  -Continue 3 drug immunosuppression with azathioprine, prednisone, and tacrolimus  -Prednisone taper to be performed on 10/13/2022  -Continue oxycodone a prn pain  -Increased gabapentin to 200 mg nightly  -Continue BiPAP as detailed above  -Continue valganciclovir for cytomegalovirus prophylaxis through 9/28/2022 -- renally dosed for eGFR  -Continue dapsone for PJP prophylaxis  -Continue folic acid while on dapsone  -Continue nystatin.  Prophylaxis protocol post lung transplant  -consulted transplant pulmonology service        #Severe gastroparesis s/p  GJ tube placement 7/27/2022  #Significant delayed gastric emptying s/p gastrojejunostomy tube placement, present on admission  We will continue to utilize jejunal tube for nutrition.  I ordered a follow-up nuclear medicine gastric emptying study for evaluation of any hopefully improvement in the patient's gastric emptying  :: repeat GE study 9/30/22, no major change  :: RD nutrition consult placed for TF  :: Percutaneous j tube in place with G venting to gravity      #Steroid-induced hyperglycemia and diabetes mellitus  :: continue insulin Lantus with insulin NovoLog sliding scale    #HTN  # A flutter with RVR/SVT  ::  Has recently completed zio patch.  :: Continue PTA metoprolol 50 mg BID    # Acute blood loss anemia secondary to pyloric ulcer on top of chronic anemia of chronic disease, all are present on admission  -Continue pantoprazole 40 mg twice daily  -Repeat endoscopy  10/13 to check healing of ulcer, sooner if hgb declines further    #Acute metabolic encephalopathy secondary to urinary tract infection, not clear if present on admission, ordered a total of 5-day course of ceftriaxone for which the patient encephalopathy resolved     # extra  hepatic and intrahepatic biliary dilation c/f hemobilia   # Intra ductal papillary mucinous neoplasm   :: planning for ERCP on 10/7  :: s/p ERCP 8/11 showed hemobilia.   :: Monitor LFTs      #Acute on chronic heart failure with preserved ejection fraction LVEF 65%, the patient was appropriately diuresed, follow-up as outpatient         Diet: Adult Formula Drip Feeding: Continuous Nepro with Carbsteady; Jejunostomy; Goal Rate: 40 ml/hr--okay to begin at goal once new formula arrives on unit.; mL/hr; Medication - Feeding Tube Flush Frequency: At least 15-30 mL water before and after med...  Snacks/Supplements Adult: Nepro Oral Supplement; Between Meals  Full Liquid Diet    DVT Prophylaxis: Heparin SQ  Dong Catheter: Not present  Central Lines: PRESENT  CVC Double Lumen Right Internal jugular Tunneled-Site Assessment: WDL  Cardiac Monitoring: None  Code Status: Full Code      Disposition Plan     Expected Discharge Date: 10/03/2022,  9:00 AM      Discharge Comments: Decision will need to be made as the patient would need dialysis after discharge or not.  The patient received dialysis on 9/27 and 9/28 while awaiting improvement of her kidney function.  Hopefully the patient would not need dialysis upon discharge. TBD -staying at the [facility] for another several months, so [employer] would probably work. She will need transportation.      The patient's care was discussed with the Patient, Patient's Family and Pulmonary Consultant.    Greg Mujica MD  Hospitalist Service, GOLD TEAM [address] [place] [address]  Securely message with the Vocera Web Console (learn more here)  Text page via [facility] Paging/Directory   Please see signed in provider for up to date coverage information      Clinically Significant Risk Factors Present on Admission                      ______________________________________________________________________    Interval History   Seen on rounds today,  doing well, up to chiar  Asking about PO intake, rreally wants brussel sprout, gave Gave education, anticipatory guidance re: PO intake of solids with gastroaparesis, 18 min face to face  Says no CP no SOB no FCS  Four-point review of systems is otherwise negative.    Data reviewed today: I reviewed all medications, new labs and imaging results over the last 24 hours.     Physical Exam   Vital Signs: Temp: 99  F (37.2  C) Temp src: Oral BP: 135/74 Pulse: 97   Resp: 18 SpO2: 98 % O2 Device: None (Room air) Oxygen Delivery: 1 LPM  Weight: 155 lbs 14.4 oz  EXAM  General: frail appearing woman sitting up in [facility]  Head: NC, AT  Eye: symm gaze, anicteric sclerae  ENT: patent nares wo drainage/epistaxis, MMM  Pulm: CTAB, comfortable WOB on RA   CV: normal rate, reg rhythm;   Neuro: awake, alert, hearing speech and phonation, intact grossly           Data   Recent Labs   Lab 10/01/22  2106 10/01/22  1601 10/01/22  1021 10/01/22  0645 09/30/22  1020 09/30/22  0610 09/29/22  0944 09/29/22  0603 09/28/22  0809 09/28/22  0514 09/27/22  0950 09/27/22  0511 09/26/22  0913 09/26/22  0555   WBC  --   --   --   --   --   --   --   --   --  10.5  --  8.4  --  8.5   HGB  --   --   --   --   --   --   --   --   --  8.4*  --  7.9*  --  8.2*   MCV  --   --   --   --   --   --   --   --   --  102*  --  105*  --  103*   PLT  --   --   --   --   --   --   --   --   --  231  --  217  --  224   NA  --   --   --  132*  --  134*  --  133*  --  133*  --  135*  --  137   POTASSIUM  --   --   --  4.7  --  4.4  --  4.1  --  4.2  --  4.1  --  4.9   CHLORIDE  --   --   --  95*  --  95*  --  94*  --  95*  --  91*  --  87*   CO2  --   --   --  33*  --  31*  --  31*  --  32*  --  39*  --  44*   BUN  --   --   --  24.8*  --  39.0*  --  22.9  --  39.2*  --  63.4*  --  96.7*   CR  --   --   --  1.71*  --  2.09*  --  1.60*  --  1.44*  --  1.48*  --  1.72*   ANIONGAP  --   --   --  4*  --  8  --  8  --  6*  --  5*  --  6*   ESTUARDO  --   --   --  8.8  --  9.1   --  8.8  --  8.9  --  9.0  --  9.4   * 189* 149* 132*   < > 127*   < > 129*   < > 143*   < > 173*   < > 148*   ALBUMIN  --   --   --  3.0*  --  3.0*  --  3.0*  --  2.9*  --  3.1*  --  3.1*   PROTTOTAL  --   --   --  5.1*  --  5.1*  --  5.3*  --  5.1*  --  5.2*  --  5.4*   BILITOTAL  --   --   --  0.2  --  0.2  --  0.2  --  0.2  --  0.2  --  0.2   ALKPHOS  --   --   --  80  --  71  --  87  --  86  --  85  --  79   ALT  --   --   --  9*  --  12  --  16  --  13  --  12  --  11   AST  --   --   --  17  --  19  --  21  --  20  --  21  --  25    < > = values in this interval not displayed.     No results found for this or any previous visit (from the past 24 hour(s)).  Medications     dextrose       - MEDICATION INSTRUCTIONS -       - MEDICATION INSTRUCTIONS -         azaTHIOprine  100 mg Per J Tube Daily     B and C vitamin Complex with folic acid  5 mL Oral Daily     calcium carbonate 600 mg-vitamin D 400 units  1 tablet Per J Tube BID w/meals     cyanocobalamin  500 mcg Per Feeding Tube Daily     dapsone  50 mg Per J Tube Q Mon Wed Fri AM     fiber modular (NUTRISOURCE FIBER)  1 packet Per Feeding Tube BID     folic acid  1 mg Oral or Feeding Tube Daily     gabapentin  200 mg Oral At Bedtime     heparin ANTICOAGULANT  5,000 Units Subcutaneous Q12H     insulin aspart  1-6 Units Subcutaneous Q6H     [Held by provider] insulin glargine  6 Units Subcutaneous QAM AC     lidocaine  2 patch Transdermal Q24h    And     lidocaine   Transdermal Q8H [facility]     menthol  1 patch Topical QAM    And     menthol   Transdermal Q8H     menthol   Transdermal Daily     metoprolol  12.5 mg Per J Tube BID     nystatin  1,000,000 Units Swish & Swallow 4x Daily     pantoprazole  40 mg Per J Tube BID     prednisoLONE  10 mg Per J Tube Daily     prednisoLONE  7.5 mg Per J Tube QPM     sodium chloride (PF)  3 mL Intracatheter Q8H     tacrolimus  4 mg Per J Tube QPM     tacrolimus  4.5 mg Per J Tube QAM     **a portion of my previous note is  copied and pasted above, it has been edited as needed to reflect events for today**

## 2022-10-03 ENCOUNTER — TELEPHONE (OUTPATIENT)
Dept: GASTROENTEROLOGY | Facility: CLINIC | Age: 62
End: 2022-10-03

## 2022-10-03 ENCOUNTER — APPOINTMENT (OUTPATIENT)
Dept: OCCUPATIONAL THERAPY | Facility: CLINIC | Age: 62
DRG: 205 | End: 2022-10-03
Attending: INTERNAL MEDICINE
Payer: MEDICARE

## 2022-10-03 ENCOUNTER — PRE VISIT (OUTPATIENT)
Dept: SURGERY | Facility: CLINIC | Age: 62
End: 2022-10-03

## 2022-10-03 LAB
ALBUMIN SERPL BCG-MCNC: 3.1 G/DL (ref 3.5–5.2)
ALP SERPL-CCNC: 80 U/L (ref 35–104)
ALT SERPL W P-5'-P-CCNC: 12 U/L (ref 10–35)
ANION GAP SERPL CALCULATED.3IONS-SCNC: 6 MMOL/L (ref 7–15)
AST SERPL W P-5'-P-CCNC: 16 U/L (ref 10–35)
BASE EXCESS BLDV CALC-SCNC: 10.2 MMOL/L (ref -7.7–1.9)
BILIRUB SERPL-MCNC: 0.2 MG/DL
BUN SERPL-MCNC: 55 MG/DL (ref 8–23)
CALCIUM SERPL-MCNC: 9.5 MG/DL (ref 8.8–10.2)
CHLORIDE SERPL-SCNC: 96 MMOL/L (ref 98–107)
CREAT SERPL-MCNC: 2.45 MG/DL (ref 0.51–0.95)
DEPRECATED HCO3 PLAS-SCNC: 35 MMOL/L (ref 22–29)
GFR SERPL CREATININE-BSD FRML MDRD: 22 ML/MIN/1.73M2
GLUCOSE BLDC GLUCOMTR-MCNC: 147 MG/DL (ref 70–99)
GLUCOSE BLDC GLUCOMTR-MCNC: 148 MG/DL (ref 70–99)
GLUCOSE BLDC GLUCOMTR-MCNC: 177 MG/DL (ref 70–99)
GLUCOSE BLDC GLUCOMTR-MCNC: 185 MG/DL (ref 70–99)
GLUCOSE SERPL-MCNC: 136 MG/DL (ref 70–99)
HCO3 BLDV-SCNC: 37 MMOL/L (ref 21–28)
O2/TOTAL GAS SETTING VFR VENT: 20 %
PCO2 BLDV: 64 MM HG (ref 40–50)
PH BLDV: 7.37 [PH] (ref 7.32–7.43)
PO2 BLDV: 71 MM HG (ref 25–47)
POTASSIUM SERPL-SCNC: 4.4 MMOL/L (ref 3.4–5.3)
PROT SERPL-MCNC: 5.1 G/DL (ref 6.4–8.3)
SODIUM SERPL-SCNC: 137 MMOL/L (ref 136–145)
TACROLIMUS BLD-MCNC: 12.7 UG/L (ref 5–15)
TME LAST DOSE: NORMAL H
TME LAST DOSE: NORMAL H

## 2022-10-03 PROCEDURE — 97530 THERAPEUTIC ACTIVITIES: CPT | Mod: GO

## 2022-10-03 PROCEDURE — 258N000003 HC RX IP 258 OP 636

## 2022-10-03 PROCEDURE — 99233 SBSQ HOSP IP/OBS HIGH 50: CPT | Performed by: NURSE PRACTITIONER

## 2022-10-03 PROCEDURE — 36415 COLL VENOUS BLD VENIPUNCTURE: CPT

## 2022-10-03 PROCEDURE — 80197 ASSAY OF TACROLIMUS: CPT | Performed by: PHYSICIAN ASSISTANT

## 2022-10-03 PROCEDURE — 250N000013 HC RX MED GY IP 250 OP 250 PS 637: Performed by: INTERNAL MEDICINE

## 2022-10-03 PROCEDURE — 97535 SELF CARE MNGMENT TRAINING: CPT | Mod: GO

## 2022-10-03 PROCEDURE — 250N000009 HC RX 250: Performed by: INTERNAL MEDICINE

## 2022-10-03 PROCEDURE — 250N000011 HC RX IP 250 OP 636: Performed by: STUDENT IN AN ORGANIZED HEALTH CARE EDUCATION/TRAINING PROGRAM

## 2022-10-03 PROCEDURE — 82803 BLOOD GASES ANY COMBINATION: CPT | Performed by: PHYSICIAN ASSISTANT

## 2022-10-03 PROCEDURE — 99232 SBSQ HOSP IP/OBS MODERATE 35: CPT | Performed by: PEDIATRICS

## 2022-10-03 PROCEDURE — 214N000001 HC R&B CCU UMMC

## 2022-10-03 PROCEDURE — 250N000012 HC RX MED GY IP 250 OP 636 PS 637: Performed by: NURSE PRACTITIONER

## 2022-10-03 PROCEDURE — 90937 HEMODIALYSIS REPEATED EVAL: CPT

## 2022-10-03 PROCEDURE — 87205 SMEAR GRAM STAIN: CPT | Performed by: INTERNAL MEDICINE

## 2022-10-03 PROCEDURE — 250N000009 HC RX 250: Performed by: NURSE PRACTITIONER

## 2022-10-03 PROCEDURE — 36415 COLL VENOUS BLD VENIPUNCTURE: CPT | Performed by: PHYSICIAN ASSISTANT

## 2022-10-03 PROCEDURE — 634N000001 HC RX 634

## 2022-10-03 PROCEDURE — 87077 CULTURE AEROBIC IDENTIFY: CPT | Performed by: INTERNAL MEDICINE

## 2022-10-03 PROCEDURE — 250N000013 HC RX MED GY IP 250 OP 250 PS 637: Performed by: STUDENT IN AN ORGANIZED HEALTH CARE EDUCATION/TRAINING PROGRAM

## 2022-10-03 PROCEDURE — 250N000013 HC RX MED GY IP 250 OP 250 PS 637

## 2022-10-03 PROCEDURE — 90935 HEMODIALYSIS ONE EVALUATION: CPT | Performed by: INTERNAL MEDICINE

## 2022-10-03 PROCEDURE — 82310 ASSAY OF CALCIUM: CPT

## 2022-10-03 PROCEDURE — 250N000013 HC RX MED GY IP 250 OP 250 PS 637: Performed by: NURSE PRACTITIONER

## 2022-10-03 RX ADMIN — CALCIUM CARBONATE 600 MG (1,500 MG)-VITAMIN D3 400 UNIT TABLET 1 TABLET: at 07:53

## 2022-10-03 RX ADMIN — OXYCODONE HYDROCHLORIDE 5 MG: 5 SOLUTION ORAL at 20:28

## 2022-10-03 RX ADMIN — PREDNISOLONE 10 MG: 15 SOLUTION ORAL at 07:51

## 2022-10-03 RX ADMIN — Medication: at 08:19

## 2022-10-03 RX ADMIN — Medication 5 ML: at 11:40

## 2022-10-03 RX ADMIN — GABAPENTIN 200 MG: 250 SOLUTION ORAL at 20:28

## 2022-10-03 RX ADMIN — HEPARIN SODIUM 5000 UNITS: 5000 INJECTION, SOLUTION INTRAVENOUS; SUBCUTANEOUS at 08:07

## 2022-10-03 RX ADMIN — NYSTATIN 1000000 UNITS: 100000 SUSPENSION ORAL at 07:54

## 2022-10-03 RX ADMIN — ORAL VEHICLES - SUSP 12.5 MG: SUSPENSION at 20:27

## 2022-10-03 RX ADMIN — TACROLIMUS 4.5 MG: 5 CAPSULE ORAL at 07:49

## 2022-10-03 RX ADMIN — INSULIN ASPART 1 UNITS: 100 INJECTION, SOLUTION INTRAVENOUS; SUBCUTANEOUS at 04:06

## 2022-10-03 RX ADMIN — PREDNISOLONE 7.5 MG: 15 SOLUTION ORAL at 20:28

## 2022-10-03 RX ADMIN — SODIUM CHLORIDE 300 ML: 9 INJECTION, SOLUTION INTRAVENOUS at 08:18

## 2022-10-03 RX ADMIN — DAPSONE 50 MG: 100 TABLET ORAL at 07:50

## 2022-10-03 RX ADMIN — NYSTATIN 1000000 UNITS: 100000 SUSPENSION ORAL at 12:54

## 2022-10-03 RX ADMIN — TACROLIMUS 4 MG: 5 CAPSULE ORAL at 18:50

## 2022-10-03 RX ADMIN — Medication 1 PACKET: at 11:40

## 2022-10-03 RX ADMIN — CALCIUM CARBONATE 600 MG (1,500 MG)-VITAMIN D3 400 UNIT TABLET 1 TABLET: at 18:50

## 2022-10-03 RX ADMIN — EPOETIN ALFA-EPBX 5000 UNITS: 10000 INJECTION, SOLUTION INTRAVENOUS; SUBCUTANEOUS at 09:52

## 2022-10-03 RX ADMIN — ORAL VEHICLES - SUSP 12.5 MG: SUSPENSION at 11:46

## 2022-10-03 RX ADMIN — Medication 40 MG: at 20:27

## 2022-10-03 RX ADMIN — INSULIN ASPART 1 UNITS: 100 INJECTION, SOLUTION INTRAVENOUS; SUBCUTANEOUS at 16:29

## 2022-10-03 RX ADMIN — OXYCODONE HYDROCHLORIDE 5 MG: 5 SOLUTION ORAL at 08:07

## 2022-10-03 RX ADMIN — LOPERAMIDE HYDROCHLORIDE 2 MG: 2 CAPSULE ORAL at 12:55

## 2022-10-03 RX ADMIN — INSULIN ASPART 1 UNITS: 100 INJECTION, SOLUTION INTRAVENOUS; SUBCUTANEOUS at 22:13

## 2022-10-03 RX ADMIN — Medication 1 PACKET: at 20:27

## 2022-10-03 RX ADMIN — HEPARIN SODIUM 5000 UNITS: 5000 INJECTION, SOLUTION INTRAVENOUS; SUBCUTANEOUS at 20:28

## 2022-10-03 RX ADMIN — NYSTATIN 1000000 UNITS: 100000 SUSPENSION ORAL at 20:28

## 2022-10-03 RX ADMIN — SODIUM CHLORIDE 250 ML: 9 INJECTION, SOLUTION INTRAVENOUS at 08:18

## 2022-10-03 RX ADMIN — NYSTATIN 1000000 UNITS: 100000 SUSPENSION ORAL at 16:31

## 2022-10-03 RX ADMIN — INSULIN ASPART 1 UNITS: 100 INJECTION, SOLUTION INTRAVENOUS; SUBCUTANEOUS at 11:39

## 2022-10-03 RX ADMIN — OXYCODONE HYDROCHLORIDE 5 MG: 5 SOLUTION ORAL at 14:03

## 2022-10-03 RX ADMIN — LOPERAMIDE HYDROCHLORIDE 2 MG: 2 CAPSULE ORAL at 07:45

## 2022-10-03 RX ADMIN — CYANOCOBALAMIN TAB 500 MCG 500 MCG: 500 TAB at 07:53

## 2022-10-03 RX ADMIN — Medication 100 MG: at 07:50

## 2022-10-03 RX ADMIN — Medication 40 MG: at 07:52

## 2022-10-03 ASSESSMENT — ACTIVITIES OF DAILY LIVING (ADL)
ADLS_ACUITY_SCORE: 27
ADLS_ACUITY_SCORE: 31
ADLS_ACUITY_SCORE: 27
ADLS_ACUITY_SCORE: 31
ADLS_ACUITY_SCORE: 31
ADLS_ACUITY_SCORE: 27
ADLS_ACUITY_SCORE: 31

## 2022-10-03 NOTE — PROGRESS NOTES
Care Management Follow Up    Length of Stay (days): 24    Expected Discharge Date: 10/03/2022     Concerns to be Addressed: all concerns addressed in this encounter     Patient plan of care discussed at interdisciplinary rounds: Yes    Anticipated Discharge Disposition: [place]     Anticipated Discharge Services: Home Infusion, OP HD  Anticipated Discharge DME: None    Referrals Placed by CM/SW: Outpt Dialysis    Additional Information:  This writer called [facility] (Ph: [phone], Fax [phone]    - Claudia ext [phone]) and spoke with Chantal requesting update on OP HD chair placement. Chantal stated they are requesting updated HD run sheets and the clinic is still reviewing. This writer faxed additional OP HD run sheets and requested follow up this afternoon.    1315: Received call back from Claudia with [facility] that they need progress notes referencing pt's chest tube removal on 9/29. This writer faxed progress notes.    CC will continue to monitor patient's medical condition and progress towards discharge.  Sana Castillo RN BSN  6C Unit Care Coordinator  Phone number: [phone]  Pager: [phone]

## 2022-10-03 NOTE — PROGRESS NOTES
Nephrology dialysis note    This patient was seen and examined while on dialysis. Laboratory results and nurses' notes were reviewed. Pt doing well. No fevers. Pus noted at dialysis catheter site, swabbed by RN    No changes to management of volume, anemia, BMD, acidosis, or electrolytes.    Diagnosis - BACLIIO now HD dependent. Next run likely Wednesday.    Dylan Bryan MD  [phone]

## 2022-10-03 NOTE — PLAN OF CARE
D: Pt stable and comfortable. AVSS, afebrile, room air. No nausea or abdominal discomfort. G tube clamped. Back pain relieved with oxycodone. No shortness of breath noted. Imodium x 2 this shift.  I: Monitored/assessed pt. Assisted with cares.  A: Pt stable and comfortable.  P: Continue to monitor/assess pt, contact provider with concerns. Sleep oximetry study 10/3

## 2022-10-03 NOTE — TELEPHONE ENCOUNTER
----- Message from Rika Kirk PA-C sent at 10/3/2022 11:14 AM CDT -----  Hello -    This patient is currently scheduled for an EGD with Dr. Childs on 10/13 for follow up of bleeding peptic ulcer. She will be having this EGD along with ERCP on 10/7 with Dr. Arroyo.    Please cancel the 10/13 EGD with Dr. Childs.    Thank you,  Rika Kirk PA-C  Inpatient gastroenterology

## 2022-10-03 NOTE — PROGRESS NOTES
HEMODIALYSIS TREATMENT NOTE    Date: 10/3/2022  Time: 10:16 AM    Data:  Pre Wt: 70.8 kg    Desired Wt: 68.3 kg   Post Wt: 68.3 kg   Weight change: 2.5 kg  Ultrafiltration - Post Run Net Total Removed (mL): 2500 mL  Vascular Access Status: CVC  patent  Dialyzer Rinse: Streaked, Moderate  Total Blood Volume Processed: 67.39 L   Total Dialysis (Treatment) Time: 3   Dialysate Bath: K 3, Ca 2.25  Heparin: None    Lab:   No    Interventions:Assessment  Pt had stable HD for 3 hrs on K 3, Ca 2.25 bath. Pt was vitally stable. Alert and oriented x4. Calm and cooperative. WX=479 no insulin coverage needed per sliding scale. Able to tolerate 2.5 L net fluid removed. New CVC dressing applied. Pus noted around CVC exist site. Dr. Bryan paged and culture collected. 400 BFR achieved. Lumens locked with saline and capped with clear guard. Handoff report given to RIYA Costa.     Plan:    Next HD per renal

## 2022-10-03 NOTE — CONFIDENTIAL NOTE
Caller: per staff message from Rika Kirk    Procedure: EGD    Date, Location, and Surgeon of Procedure Cancelled: 10/13/2022, Amadeo BARGER    Ordering Provider:Tenzin Arroyo MD    Reason for cancel (please be detailed, any staff messages or encounters to note?): pt is being seen in the OR        Rescheduled: not needed.      If rescheduled:    Date:    Location:    Prep Resent: (changes to prep?)   Covid Test Rescheduled:    Note any change or update to original order/sedation:

## 2022-10-03 NOTE — PROGRESS NOTES
Pulmonary Medicine  Cystic Fibrosis - Lung Transplant Team  Progress Note  10/03/2022       Patient: Sofie Rodriguez  MRN: [id number]  : 1960 (age 62 year old)  Transplant: 2022 (Lung), POD#97  Admission date: 2022    Assessment & Plan:     Sofie Rodriguez is a 62-year-old female s/p BSLT (22) for COPD complicated by persistent bilateral pleural effusions, persistent CO2 retention, right hemidiaphragm palsy, BACILIO (dialysis -), C. diff colitis, gastroparesis s/p GJ tube placement (22), GI bleed 2/2 pyloric ulcer, hemobilia s/p ERCP and MRCP (), and persistent vasoplegia.  Other history notable for HFpEF, NTM colonoization, hepatitis C, and methamphetamine use.  Patient admitted 22 with persistent dyspnea (increased with activity), daily morning hemoptysis, and increased BLE edema.  Also noted to have persistent, increased bilateral pleural effusions, diffuse bilateral groundglass changes, and more focal appearing LLL infiltrates on imaging.  Treating with aggressive diuresis with some improvement in symptoms and hypoxia.  Hemoptysis resolved, mild intermittent hypoxia and ANAYA persisted.  S/p bilateral chest tubes.  Episode of AMS now resolved.  S/p tunneled line placed and iHD initiation ().  Anticipate discharge pending OP dialysis placement.      Today's recommendations:  - Exercise and overnight oximetry study ordered today  - VBG this morning for monitoring, grossly stable  - Tacrolimus level supratherapeutic, dose decreased, repeat level 10/6 (ordered)  - Prednisone taper due 10/13   - CMV, DSA, and EBV ordered 10/5  - IgG level due 10/29   - Titrate loperamide to 2-3 stools daily (without urgency)   - G tube to gravity drainage prn with GI symptoms (N/V, bloating, reflux); full liquid diet for comfort only  - EGD currently scheduled 10/13 to check healing of ulcer and ERCP currently scheduled 10/7 (primary team to discuss timing with GI pending dispo plan)     S/p  bilateral sequential lung transplant (BSLT) for end stage COPD:  Acute hypoxia with chronic hypercapnia:   Pulmonary edema:  Persistent bibasilar pleural effusions:   Right hemidiaphragm palsy: Admitted with increased dyspnea with minimal exertion and small volume daily hemoptysis.  Also with marked decline in PFTs (FEV1 1.22L, 50% on 8/18 to 0.98L, 40% on 9/7).  Chest CT (9/8) with increased effusions and groundglass changes.  DSA positive but stable (as below).  BNP markedly elevated (30k) and also with increased BLE edema.  Etiology unclear and is likely multi-factorial with DDx including pulmonary edema from hypervolemia/progressive HFpEF, rejection (ACR +/- AMR), alveolar hemorrhage, and/or infection.  Aggressively diuresed with some improvement in symptoms.  Bronch (9/13) unremarkable, BAL of lingula sent, cultures no growth (GPC on gram stain only).  S/p right pigtail chest tube placement (9/13-9/22), transudative with moderate output initially but quickly decreasing, cultures negative.  S/p aspiration of left pleural effusion (9/13) and then pigtail chest tube (9/15-9/29) s/p full lytic course (916-9/19), cultures also negative, clamped 9/26.  Chest CT (9/27) with grossly unchanged small bilateral hydroPTX with left chest tube coiled in inferior medial left pleural space, continued resolution of nodular opacity in posterior MARLON, and the lobes of the left upper and lower are rotated with respect to each other (discussed at transplant conference 9/29, no intervention).  Weaned to RA at rest on 9/24, using 1L NC overnight.  VBG with improved hypercapnia 9/29, stable today.  Repeat CXR (9/30) with stable small left pleural effusion.  - Supplemental O2 to keep >92%, exercise and overnight oximetry study ordered  - Continue to defer NIPPV, repeat VBG prn with change in mentation/clinical status and 10/3 for monitoring (ordered)  - IS and Aerobika q1h w/a and encourage OOB during the day as much as  able  - Pulmonary follow up 1-2 weeks from discharge     Immunosuppression:  - Tacrolimus 4.5 mg qAM / 4 mg qPM (increased 9/19).  Goal level 8-12.  Level today 12.7, dose decreased to 4 mg BID.  Repeat level 10/6 (ordered).  - Azathioprine 100 mg daily (9/20, MMF stopped due to ongoing diarrhea)  - Prednisone 10 mg qAM / 7.5 mg qPM with next taper due 10/13 (not yet ordered)  Date AM dose (mg) PM dose (mg)   9/15/22 10 7.5   10/13/22 7.5 7.5   11/10/22 7.5 5   12/8/22 5 5   1/5/23 5 2.5      Prophylaxis:   - Dapsone qMWF for PJP ppx (resumed 9/19, monitor for worsening anemia; Bactrim stopped d/t hyperkalemia, will need to monitor for recurrence of anemia with dapsone; Pentamidine neb not tolerated on 9/7 after only 5 minutes d/t excessive coughing)  - Completed VGCV for CMV ppx 9/26 (through POD#90), D+/R+, CMV monitoring every other week (starting 10/5, ordered)      Positive DSA: Newly positive on 8/10 with DQB2 mfi 2155.   - Monitoring [id number], next due 10/5 (ordered), see below for trend:  Date DQB2 Notes   8/10 2155     9/1 7033 Current peak   9/21 5390 Most recent       EBV viremia: Low level (1374 on 9/7), not likely to be clinically significant.  - Follow EBV monthly (ordered 10/5)     Hypogammaglobulinemia: IgG adequate at time of transplant, repeat level low (336) on 7/28.  S/p IVIG 7/30, tolerated well.  IgG on 9/29 low at 559 but not indicating IVIG replacement.   - Follow IgG monthly (next due 10/29)     Other relevant problems being managed by the primary team:     Encephalopathy, Resolved:  Tremors:   Presumed UTI: Noted 9/19 with confusion as well as tremor.  DDx including hypercapnia, hyperammonia, infection, uremia (see below), medication related.  VBG with worsening hypercapnia (99).  BUN elevated (112).  Ammonia normal (24).  CRP normal, procal 0.1.  UA with moderate blood, large leukocyte esterase, but UC (9/19) negative.  Blood culture (9/19) negative.  Tacro not supratherapeutic.  Head CT  (9/20) without acute intracranial pathology.  AMS resolved ~9/22.  S/p ceftriaxone 5-day course (9/20-9/24).     ESRD based upon Cystatin C (GFR of 10): Nephrology consulted 9/21, see their note for details, with concern for CKD5 as Cr may suggest overestimation of kidney function with limited muscle mass, unclear if trend over the past week reflects BACILIO.  Cystatin C 4.3 with GFR 10.  Making urine.  Renal US (9/22) normal.  S/p tunneled line placed and HD initiated 9/26.  Management per nephrology.     Diarrhea: C diff negative on 9/10 and 9/20.  Likely medication related (MMF), but unable to transition to Myfortic given NPO.  Loperamide utilized with some benefit.   Enteric stool panel negative 9/16.  Transitioned from MMF to AZA as above.   - Goal to titrate to antidiarrheal 3 stools daily (without urgency), management per primary.      Severe gastroparesis:   Pyloric ulcer: Gastric emptying study 7/20 with severe gastric emptying delay (95% retention at 4 hours), s/p GJ tube placement in IR 7/27.  S/p EGD (8/3) noted to have pyloric ulcer and excessive gastric fluid and residual food.  S/p EGD (8/11) with mild erythema 2/2 GJ tube trauma seen near insertion site but no active bleeding.  Repeat gastric emptying study 9/30 unfortunately with persistent severe retention (91% at 4h) and some fullness and nausea post study; defer adjustments to current plan and consider semi-permanent status as is.  - PPI BID  - TF continuous and ALL enteral medications via J tube  - G tube to gravity drainage prn with GI symptoms (N/V, bloating, reflux); full liquid diet for comfort only     We appreciate the excellent care provided by the [facility] team.  Recommendations communicated via in person rounding and this note.  Will continue to follow along closely, please do not hesitate to call with any questions or concerns.    Patient discussed with Dr. Paredes.    Catherine Johnson, DNP, APRN, CNP  Inpatient Nurse  "Practitioner  Pulmonary CF/Transplant     Subjective & Interval History:     Remains on RA during the day with 1L NC overnight.  No new cough or sputum production.  Still needing G tube venting d/t nausea and fullness.  Loose stools table with current loperamide regimen.    Review of Systems:     C: No fever, no chills, no change in weight  INTEGUMENTARY/SKIN: No rash or obvious new lesions  ENT/MOUTH: No sore throat, no sinus pain, no nasal congestion or drainage  RESP: See interval history  CV: No chest pain, no palpitations, + peripheral edema, no orthopnea  GI: No reflux symptoms  : No dysuria  MUSCULOSKELETAL: + back pain  ENDOCRINE: Blood sugars with adequate control  NEURO: No headache, no numbness or tingling  PSYCHIATRIC: Mood stable    Physical Exam:     All notes, images, and labs from past 24 hours (at minimum) were reviewed.    Vital signs:  Temp: 98.7  F (37.1  C) Temp src: Oral BP: 126/74 Pulse: 96   Resp: 16 SpO2: 100 % O2 Device: None (Room air) Oxygen Delivery: 1 LPM Height: 158.8 cm (5' 2.5\") Weight: 70.8 kg (156 lb 1.6 oz)  I/O:     Intake/Output Summary (Last 24 hours) at 10/3/2022 0755  Last data filed at 10/3/2022 0600  Gross per 24 hour   Intake 1360 ml   Output 900 ml   Net 460 ml     Constitutional: Lying in bed during iHD run, in no apparent distress.   HEENT: Eyes with pink conjunctivae, anicteric.  Oral mucosa moist without lesions.   PULM: Mildly diminished bases bilaterally.  No crackles, no rhonchi, no wheezes.  Non-labored breathing on RA.  CV: Normal S1 and S2.  RRR.  No murmur, gallop, or rub.  1+ BLE edema.   ABD: NABS, soft, nontender, nondistended.  PEG/J tube site not visualized.  MSK: Moves all extremities.  No apparent muscle wasting.   NEURO: Sleeping, minimally conversant.   SKIN: Warm, dry.  No rash on limited exam.   PSYCH: Mood stable.     Lines, Drains, and Devices:  Peripheral IV 09/10/22 Anterior;Left Lower forearm (Active)   Site Assessment WDL 10/02/22 2100   Line " Status Saline locked 10/02/22 2100   Dressing Intervention New dressing  09/10/22 0135   Phlebitis Scale 0-->no symptoms 10/02/22 2100   Infiltration Scale 0 10/02/22 2100   Number of days: 23       CVC Double Lumen Right Internal jugular Tunneled (Active)   Site Assessment WDL 10/02/22 2100   Dressing Type Chlorhexidine disk;Transparent 10/02/22 0800   Dressing Status dry;intact;clean 10/02/22 2100   Dressing Change Due 10/03/22 10/02/22 2100   Line Necessity yes, meets criteria 10/02/22 2100   Blue - Status saline locked;cap changed 09/30/22 1615   Blue - Cap Change Due 10/05/22 09/28/22 1153   Red - Status saline locked;cap changed 09/30/22 1615   Red - Cap Change Due 10/05/22 09/28/22 1153   CVC Comment HD line 10/02/22 2100   Number of days: 7     Data:     LABS    CMP:   Recent Labs   Lab 10/03/22  0548 10/03/22  0406 10/02/22  2201 10/02/22  1558 10/02/22  0824 10/02/22  0638 10/01/22  1021 10/01/22  0645 09/30/22  1020 09/30/22  0610 09/28/22  0809 09/28/22  0514 09/27/22  0950 09/27/22  0511     --   --   --   --  136  --  132*  --  134*   < > 133*  --  135*   POTASSIUM 4.4  --   --   --   --  4.2  --  4.7  --  4.4   < > 4.2  --  4.1   CHLORIDE 96*  --   --   --   --  96*  --  95*  --  95*   < > 95*  --  91*   CO2 35*  --   --   --   --  34*  --  33*  --  31*   < > 32*  --  39*   ANIONGAP 6*  --   --   --   --  6*  --  4*  --  8   < > 6*  --  5*   * 147* 155* 173*   < > 137*   < > 132*   < > 127*   < > 143*   < > 173*   BUN 55.0*  --   --   --   --  41.3*  --  24.8*  --  39.0*   < > 39.2*  --  63.4*   CR 2.45*  --   --   --   --  2.25*  --  1.71*  --  2.09*   < > 1.44*  --  1.48*   GFRESTIMATED 22*  --   --   --   --  24*  --  34*  --  26*   < > 41*  --  40*   ESTUARDO 9.5  --   --   --   --  8.9  --  8.8  --  9.1   < > 8.9  --  9.0   MAG  --   --   --   --   --  2.0  --   --   --   --   --  2.0  --  2.3   PHOS  --   --   --   --   --   --   --   --   --   --   --  3.2  --  3.5   PROTTOTAL 5.1*  --    --   --   --  5.0*  --  5.1*  --  5.1*   < > 5.1*  --  5.2*   ALBUMIN 3.1*  --   --   --   --  3.0*  --  3.0*  --  3.0*   < > 2.9*  --  3.1*   BILITOTAL 0.2  --   --   --   --  0.2  --  0.2  --  0.2   < > 0.2  --  0.2   ALKPHOS 80  --   --   --   --  86  --  80  --  71   < > 86  --  85   AST 16  --   --   --   --  18  --  17  --  19   < > 20  --  21   ALT 12  --   --   --   --  12  --  9*  --  12   < > 13  --  12    < > = values in this interval not displayed.     CBC:   Recent Labs   Lab 10/02/22  0638 09/28/22  0514 09/27/22  0511   WBC 7.8 10.5 8.4   RBC 2.57* 2.65* 2.51*   HGB 8.4* 8.4* 7.9*   HCT 26.6* 27.0* 26.3*   * 102* 105*   MCH 32.7 31.7 31.5   MCHC 31.6 31.1* 30.0*   RDW 20.1* 19.8* 19.9*    231 217       INR: No lab results found in last 7 days.    Glucose:   Recent Labs   Lab 10/03/22  0548 10/03/22  0406 10/02/22  2201 10/02/22  1558 10/02/22  1017 10/02/22  0824   * 147* 155* 173* 140* 136*       Blood Gas:   Recent Labs   Lab 10/03/22  0547 09/29/22  0603   PHV 7.37 7.36   PCO2V 64* 60*   PO2V 71* 48*   HCO3V 37* 34*   LINDY 10.2* 6.7*   O2PER 20 0       Culture Data No results for input(s): CULT in the last 168 hours.    Virology Data:   Lab Results   Component Value Date    FLUAH1 Not Detected 09/13/2022    FLUAH3 Not Detected 09/13/2022    VL1411 Not Detected 09/13/2022    IFLUB Not Detected 09/13/2022    RSVA Not Detected 09/13/2022    RSVB Not Detected 09/13/2022    PIV1 Not Detected 09/13/2022    PIV2 Not Detected 09/13/2022    PIV3 Not Detected 09/13/2022    HMPV Not Detected 09/13/2022       Historical CMV results (last 3 of prior testing):  Lab Results   Component Value Date    CMVQNT Not Detected 09/30/2022    CMVQNT Not Detected 09/13/2022    CMVQNT Not Detected 09/07/2022     No results found for: CMVLOG    Urine Studies    Recent Labs   Lab Test 09/19/22  2254 08/01/22  0319 07/08/22  0831   URINEPH 7.0 8.0* 5.5   NITRITE Negative Negative Negative   LEUKEST Large*  Negative Negative   WBCU 29*  --  1       Most Recent [place] Pulmonary Function Testing (FVC/FEV1 only)  FVC-Pre   Date Value Ref Range Status   09/07/2022 1.01 L    09/01/2022 1.07 L    08/24/2022 1.23 L    08/18/2022 1.33 L      FVC-%Pred-Pre   Date Value Ref Range Status   09/07/2022 33 %    09/01/2022 35 %    08/24/2022 40 %    08/18/2022 43 %      FEV1-Pre   Date Value Ref Range Status   09/07/2022 0.98 L    09/01/2022 1.02 L    08/24/2022 1.17 L    08/18/2022 1.22 L      FEV1-%Pred-Pre   Date Value Ref Range Status   09/07/2022 40 %    09/01/2022 42 %    08/24/2022 48 %    08/18/2022 50 %        IMAGING    No results found for this or any previous visit (from the past 48 hour(s)).

## 2022-10-03 NOTE — PLAN OF CARE
Neuro: A/Ox4. Slept well between cares.   Cardiac: No tele. HR 80's. VSS.   Respiratory: O2 sats stable on 1L at night prn, RA during day.  GI/: No BM overnight, loose BM yesterday, gave Imodium. C/O GI fullness- GT vented to gravity bag with relief of symptoms.  Diet/appetite: Small sips of full liquids, TF at 40cc/hr  Activity:  Up with SBA  Pain: Oxycodone for back pain with relief.  Skin: Intact, no new deficits noted.  LDA's: PIV sl'd    Plan: HD at 0810. Continue with POC. Notify primary team with changes.

## 2022-10-04 ENCOUNTER — APPOINTMENT (OUTPATIENT)
Dept: PHYSICAL THERAPY | Facility: CLINIC | Age: 62
DRG: 205 | End: 2022-10-04
Attending: INTERNAL MEDICINE
Payer: MEDICARE

## 2022-10-04 ENCOUNTER — APPOINTMENT (OUTPATIENT)
Dept: OCCUPATIONAL THERAPY | Facility: CLINIC | Age: 62
DRG: 205 | End: 2022-10-04
Attending: INTERNAL MEDICINE
Payer: MEDICARE

## 2022-10-04 LAB
ALBUMIN SERPL BCG-MCNC: 3.2 G/DL (ref 3.5–5.2)
ALP SERPL-CCNC: 90 U/L (ref 35–104)
ALT SERPL W P-5'-P-CCNC: 12 U/L (ref 10–35)
ANION GAP SERPL CALCULATED.3IONS-SCNC: 5 MMOL/L (ref 7–15)
AST SERPL W P-5'-P-CCNC: 18 U/L (ref 10–35)
BILIRUB SERPL-MCNC: 0.2 MG/DL
BUN SERPL-MCNC: 35.3 MG/DL (ref 8–23)
CALCIUM SERPL-MCNC: 8.9 MG/DL (ref 8.8–10.2)
CHLORIDE SERPL-SCNC: 95 MMOL/L (ref 98–107)
CREAT SERPL-MCNC: 2.05 MG/DL (ref 0.51–0.95)
DEPRECATED HCO3 PLAS-SCNC: 33 MMOL/L (ref 22–29)
GFR SERPL CREATININE-BSD FRML MDRD: 27 ML/MIN/1.73M2
GLUCOSE BLDC GLUCOMTR-MCNC: 131 MG/DL (ref 70–99)
GLUCOSE BLDC GLUCOMTR-MCNC: 148 MG/DL (ref 70–99)
GLUCOSE BLDC GLUCOMTR-MCNC: 151 MG/DL (ref 70–99)
GLUCOSE BLDC GLUCOMTR-MCNC: 167 MG/DL (ref 70–99)
GLUCOSE BLDC GLUCOMTR-MCNC: 170 MG/DL (ref 70–99)
GLUCOSE SERPL-MCNC: 134 MG/DL (ref 70–99)
POTASSIUM SERPL-SCNC: 4.2 MMOL/L (ref 3.4–5.3)
PROT SERPL-MCNC: 5.2 G/DL (ref 6.4–8.3)
SODIUM SERPL-SCNC: 133 MMOL/L (ref 136–145)

## 2022-10-04 PROCEDURE — 250N000013 HC RX MED GY IP 250 OP 250 PS 637: Performed by: INTERNAL MEDICINE

## 2022-10-04 PROCEDURE — 94762 N-INVAS EAR/PLS OXIMTRY CONT: CPT

## 2022-10-04 PROCEDURE — 999N000157 HC STATISTIC RCP TIME EA 10 MIN

## 2022-10-04 PROCEDURE — 36415 COLL VENOUS BLD VENIPUNCTURE: CPT

## 2022-10-04 PROCEDURE — 80053 COMPREHEN METABOLIC PANEL: CPT

## 2022-10-04 PROCEDURE — 250N000013 HC RX MED GY IP 250 OP 250 PS 637: Performed by: STUDENT IN AN ORGANIZED HEALTH CARE EDUCATION/TRAINING PROGRAM

## 2022-10-04 PROCEDURE — 99233 SBSQ HOSP IP/OBS HIGH 50: CPT | Performed by: PHYSICIAN ASSISTANT

## 2022-10-04 PROCEDURE — 99233 SBSQ HOSP IP/OBS HIGH 50: CPT | Performed by: INTERNAL MEDICINE

## 2022-10-04 PROCEDURE — 214N000001 HC R&B CCU UMMC

## 2022-10-04 PROCEDURE — 250N000012 HC RX MED GY IP 250 OP 636 PS 637: Performed by: NURSE PRACTITIONER

## 2022-10-04 PROCEDURE — 250N000013 HC RX MED GY IP 250 OP 250 PS 637: Performed by: NURSE PRACTITIONER

## 2022-10-04 PROCEDURE — 97530 THERAPEUTIC ACTIVITIES: CPT | Mod: GP

## 2022-10-04 PROCEDURE — 97140 MANUAL THERAPY 1/> REGIONS: CPT | Mod: GO

## 2022-10-04 PROCEDURE — 250N000009 HC RX 250: Performed by: INTERNAL MEDICINE

## 2022-10-04 PROCEDURE — 97110 THERAPEUTIC EXERCISES: CPT | Mod: GP

## 2022-10-04 PROCEDURE — 250N000012 HC RX MED GY IP 250 OP 636 PS 637: Performed by: PHYSICIAN ASSISTANT

## 2022-10-04 PROCEDURE — 250N000011 HC RX IP 250 OP 636: Performed by: STUDENT IN AN ORGANIZED HEALTH CARE EDUCATION/TRAINING PROGRAM

## 2022-10-04 PROCEDURE — 250N000013 HC RX MED GY IP 250 OP 250 PS 637

## 2022-10-04 RX ORDER — PREDNISONE 5 MG/ML
7.5 SOLUTION ORAL EVERY EVENING
Status: DISCONTINUED | OUTPATIENT
Start: 2022-10-04 | End: 2022-10-08 | Stop reason: HOSPADM

## 2022-10-04 RX ORDER — LOPERAMIDE HCL 1 MG/7.5ML
2 SUSPENSION ORAL 3 TIMES DAILY PRN
Status: DISCONTINUED | OUTPATIENT
Start: 2022-10-04 | End: 2022-10-08 | Stop reason: HOSPADM

## 2022-10-04 RX ORDER — PREDNISONE 5 MG/ML
10 SOLUTION ORAL DAILY
Status: DISCONTINUED | OUTPATIENT
Start: 2022-10-05 | End: 2022-10-08 | Stop reason: HOSPADM

## 2022-10-04 RX ADMIN — ORAL VEHICLES - SUSP 12.5 MG: SUSPENSION at 20:29

## 2022-10-04 RX ADMIN — CYANOCOBALAMIN TAB 500 MCG 500 MCG: 500 TAB at 08:16

## 2022-10-04 RX ADMIN — ORAL VEHICLES - SUSP 12.5 MG: SUSPENSION at 08:16

## 2022-10-04 RX ADMIN — LOPERAMIDE HCL 2 MG: 1 SOLUTION ORAL at 12:01

## 2022-10-04 RX ADMIN — PREDNISONE 7.5 MG: 5 SOLUTION ORAL at 20:29

## 2022-10-04 RX ADMIN — GABAPENTIN 200 MG: 250 SOLUTION ORAL at 20:29

## 2022-10-04 RX ADMIN — NYSTATIN 1000000 UNITS: 100000 SUSPENSION ORAL at 16:38

## 2022-10-04 RX ADMIN — CALCIUM CARBONATE 600 MG (1,500 MG)-VITAMIN D3 400 UNIT TABLET 1 TABLET: at 08:16

## 2022-10-04 RX ADMIN — TACROLIMUS 4 MG: 5 CAPSULE ORAL at 08:17

## 2022-10-04 RX ADMIN — Medication 5 ML: at 08:17

## 2022-10-04 RX ADMIN — INSULIN ASPART 1 UNITS: 100 INJECTION, SOLUTION INTRAVENOUS; SUBCUTANEOUS at 04:34

## 2022-10-04 RX ADMIN — INSULIN ASPART 1 UNITS: 100 INJECTION, SOLUTION INTRAVENOUS; SUBCUTANEOUS at 16:37

## 2022-10-04 RX ADMIN — Medication 1 PACKET: at 20:26

## 2022-10-04 RX ADMIN — Medication 1 PACKET: at 08:23

## 2022-10-04 RX ADMIN — Medication 100 MG: at 08:15

## 2022-10-04 RX ADMIN — OXYCODONE HYDROCHLORIDE 5 MG: 5 SOLUTION ORAL at 20:29

## 2022-10-04 RX ADMIN — TACROLIMUS 4 MG: 5 CAPSULE ORAL at 18:22

## 2022-10-04 RX ADMIN — NYSTATIN 1000000 UNITS: 100000 SUSPENSION ORAL at 20:28

## 2022-10-04 RX ADMIN — Medication 40 MG: at 08:16

## 2022-10-04 RX ADMIN — HEPARIN SODIUM 5000 UNITS: 5000 INJECTION, SOLUTION INTRAVENOUS; SUBCUTANEOUS at 20:29

## 2022-10-04 RX ADMIN — OXYCODONE HYDROCHLORIDE 5 MG: 5 SOLUTION ORAL at 10:35

## 2022-10-04 RX ADMIN — PREDNISOLONE 10 MG: 15 SOLUTION ORAL at 08:16

## 2022-10-04 RX ADMIN — NYSTATIN 1000000 UNITS: 100000 SUSPENSION ORAL at 08:15

## 2022-10-04 RX ADMIN — HEPARIN SODIUM 5000 UNITS: 5000 INJECTION, SOLUTION INTRAVENOUS; SUBCUTANEOUS at 08:16

## 2022-10-04 RX ADMIN — NYSTATIN 1000000 UNITS: 100000 SUSPENSION ORAL at 12:04

## 2022-10-04 RX ADMIN — INSULIN ASPART 1 UNITS: 100 INJECTION, SOLUTION INTRAVENOUS; SUBCUTANEOUS at 23:06

## 2022-10-04 RX ADMIN — Medication 40 MG: at 20:29

## 2022-10-04 RX ADMIN — INSULIN ASPART 1 UNITS: 100 INJECTION, SOLUTION INTRAVENOUS; SUBCUTANEOUS at 10:36

## 2022-10-04 RX ADMIN — CALCIUM CARBONATE 600 MG (1,500 MG)-VITAMIN D3 400 UNIT TABLET 1 TABLET: at 18:22

## 2022-10-04 ASSESSMENT — ACTIVITIES OF DAILY LIVING (ADL)
ADLS_ACUITY_SCORE: 27

## 2022-10-04 NOTE — PROGRESS NOTES
Care Management Follow Up    Length of Stay (days): 25    Expected Discharge Date: 10/04/2022, if OP HD chair time can be confirmed     Concerns to be Addressed: all concerns addressed in this encounter     Patient plan of care discussed at interdisciplinary rounds: Yes    [facility]     Anticipated Discharge Services: OP HD, OP ND  Anticipated Discharge DME: None    Education Provided on the Discharge Plan: Yes  Patient/Family in Agreement with the Plan: Yes    Referrals Placed by CM/SW: Outpatient dialysis  Private pay costs discussed: Not applicable    Additional Information:  This writer called [facility] (Ph: [phone], Fax [phone] - Claudia ext [phone]) and spoke with Dylan who confirmed they received additional documentation yesterday, still awaiting chair confirmation from unit. This writer requested this be expedited as pt ready for discharge.     Once HD chair confirmed, will assist pt with arranging transportation through her [facility] (Ph: [phone]).     1409: This writer again called [facility] to see if there was any progress on referral. This writer spoke with Tsoha (Claudia unavailable) who stated she will reach out to Claudia to follow up and update this writer as it is still pending.   This writer also called [facility] (Ph: [phone]) and they stated their facility admin is still reviewing.  Confirmed with pt she has home oxygen concentrator and portable tanks at the [facility] if needed. Pt was unable to complete overnight oximetry study, plan for exercise oximetry test today.    CC will continue to monitor patient's medical condition and progress towards discharge.  Sana Castillo RN BSN  6C Unit Care Coordinator  Phone number: [phone]  Pager: [phone]

## 2022-10-04 NOTE — PROVIDER NOTIFICATION
D: Pt ordered on overnight oxymetry study. On RA pt repeatedly drops to as low as 77% when sleeping deeply, but wakes up immediately with the alarm and sats back in mid 90s within a minute. For pt safety did not turn alarms off to see if pt would Sat < 88% for 5 minutes as requested per oxymetry study parameters.   I: O2 1L applied  A: MD notiifed that O2 applied.  P: Will continue to monitor pt.

## 2022-10-04 NOTE — PLAN OF CARE
Neuro: A/Ox4. Slept well between cares.   Cardiac: No tele. HR 80's. VSS.          Respiratory: O2 sats stable on 1L at night prn, RA during day. Overnight oxymetry study started but pt repeatedly dropped sats as low as 70's on RA so 1L NC applied.   GI/: No BM overnight, C/O GI fullness- GT vented to gravity bag with relief of symptoms.  Diet/appetite: Small sips of full liquids, TF at 40cc/hr  Activity:  Up with SBA  Pain: Oxycodone for back pain with relief.  Skin: Intact, no new deficits noted.  LDA's: PIV sl'd     Plan: Possible discharge- pending HD set up.  Continue with POC. Notify primary team with changes.

## 2022-10-04 NOTE — PROGRESS NOTES
[facility]    Medicine Progress Note - Hospitalist Service, GOLD TEAM 10    Date of Admission:  9/9/2022    Assessment & Plan        62 year old female with pertinent hx of end stage COPD s/p bilateral sequential lung transplant (6/22) on triple IS ( MMF/Tacro/pred), pyloric ulcer, recent ERCP c/f hemobilia, gastroparesis s/p GJ tube placement and Percutaneous J tube presents for a planned admission from transplant pulmonology to work up antibody medicated rejection versus infection.     Today's updates  -- no changes  -- dispo pending HD chair; not certain if MWF ot TTS chair; need to coordinate with outpat EGD+ERCP  Discharge items:  - Exercise and overnight oximetry 10-3 to 10-4   - ERCP and EGD will be planned same day as OP to reduce sedation greatly appr GI team asst w/coordination  - Prednisone taper due 10/13 (not yet ordered)      #Acute kidney injury AKIN stage II on CKD stage IIIb (POA)-->ESRD on HD  :: Patient had been variable GFRs 30-50s in the last few months. Most recent Cr trend 1.5-1.9 1.56 9/8/22 cystatin C obtained and GFR ~10  :: Renal consulted, cont iHD for now; uneventful placement of tunneled catheter by IR on 9/26   :: trend BMP strict input and output and daily body weight  :: Vitamin D level -25;  parathyroid hormone = 46; c/w Calcium and vit D      #Diarrhea likely secondary to tube feeding, not present on admission, improved  :: repeatedly ruled out for C. difficile colitis.   :: c/w fibers at 28 g and Imodium as needed.     #Acute hypoxic hypercarbic respiratory failure secondary to bibasilar pleural effusion currently with chest tubes bilaterally on top of end-stage COPD s/p bilateral sequential lung transplant on 6/22/2022 complicated by chronic respiratory acidosis with compensation by metabolic alkalosis, all are present on admission   This is the main problem that led to this admission, had bilateral chest tubes; patient had her  right-sided chest tube removed on 9/22/2022 and removal of Left-sided chest tube 9/29/2022   -DSA testing on 10/5/2022  -IgG level ordered for 9/29  -Vibha-Barr virus quantitative was ordered for 10/7/2022  -Continue 3 drug immunosuppression with azathioprine, prednisone, and tacrolimus  -Prednisone taper to be performed on 10/13/2022  -Continue oxycodone a prn pain  -Increased gabapentin to 200 mg nightly  -Continue BiPAP as detailed above  -Continue valganciclovir for cytomegalovirus prophylaxis through 9/28/2022 -- renally dosed for eGFR  -Continue dapsone for PJP prophylaxis  -Continue folic acid while on dapsone  -Continue nystatin.  Prophylaxis protocol post lung transplant  -consulted transplant pulmonology service        #Severe gastroparesis s/p  GJ tube placement 7/27/2022  #Significant delayed gastric emptying s/p gastrojejunostomy tube placement, present on admission  We will continue to utilize jejunal tube for nutrition.  I ordered a follow-up nuclear medicine gastric emptying study for evaluation of any hopefully improvement in the patient's gastric emptying  :: repeat GE study 9/30/22, no major change  :: RD nutrition consult placed for TF  :: Percutaneous j tube in place with G venting to gravity      #Steroid-induced hyperglycemia and diabetes mellitus  :: continue insulin Lantus with insulin NovoLog sliding scale    #HTN  # A flutter with RVR/SVT  ::  Has recently completed zio patch.  :: Continue PTA metoprolol 50 mg BID    # Acute blood loss anemia secondary to pyloric ulcer on top of chronic anemia of chronic disease, all are present on admission  -Continue pantoprazole 40 mg twice daily  -Repeat endoscopy  10/13 to check healing of ulcer, sooner if hgb declines further    #Acute metabolic encephalopathy secondary to urinary tract infection, not clear if present on admission, ordered a total of 5-day course of ceftriaxone for which the patient encephalopathy resolved     # extra hepatic and  intrahepatic biliary dilation c/f hemobilia   # Intra ductal papillary mucinous neoplasm   :: planning for ERCP on 10/7  :: s/p ERCP 8/11 showed hemobilia.   :: Monitor LFTs      #Acute on chronic heart failure with preserved ejection fraction LVEF 65%, the patient was appropriately diuresed, follow-up as outpatient         Diet: Adult Formula Drip Feeding: Continuous Nepro with Carbsteady; Jejunostomy; Goal Rate: 40 ml/hr--okay to begin at goal once new formula arrives on unit.; mL/hr; Medication - Feeding Tube Flush Frequency: At least 15-30 mL water before and after med...  Snacks/Supplements Adult: Nepro Oral Supplement; Between Meals  Full Liquid Diet    DVT Prophylaxis: Heparin SQ  Dong Catheter: Not present  Central Lines: PRESENT  CVC Double Lumen Right Internal jugular Tunneled-Site Assessment: WDL  Cardiac Monitoring: None  Code Status: Full Code      Disposition Plan      Expected Discharge Date: 10/04/2022,  9:00 AM      Discharge Comments: Decision will need to be made as the patient would need dialysis after discharge or not.  The patient received dialysis on 9/27 and 9/28 while awaiting improvement of her kidney function.  Hopefully the patient would not need dialysis upon discharge. TBD -staying at the [facility] for another several months, so [employer] would probably work. She will need transportation.      The patient's care was discussed with the Patient, Patient's Family and Pulmonary Consultant.    Greg Mujica MD  Hospitalist Service, [address]  Securely message with the Vocera Web Console (learn more here)  Text page via [facility] Paging/Directory   Please see signed in provider for up to date coverage information      Clinically Significant Risk Factors Present on Admission                      ______________________________________________________________________    Interval History   Seen on rounds today, just got back  to bed from chair, did well in HD today, pulled UF and feels pretty good  Taking minimal PO today, not feelign it  Pain controlled  Says no CP no SOB no FCS  Four-point review of systems is otherwise negative.    Data reviewed today: I reviewed all medications, new labs and imaging results over the last 24 hours.     Physical Exam   Vital Signs: Temp: 98.1  F (36.7  C) Temp src: Oral BP: 122/62 Pulse: 90   Resp: 20 SpO2: 97 % O2 Device: None (Room air) Oxygen Delivery: 1 LPM  Weight: 156 lbs 1.6 oz  EXAM  General: frail appearing woman lying in bed   Head: NC, AT  Eye: symm gaze, anicteric sclerae  ENT: patent nares wo drainage/epistaxis, MMM  Pulm: CTAB, comfortable WOB on RA   CV: normal rate, reg rhythm;   Neuro: awake, alert, hearing speech and phonation, intact grossly     Data   Recent Labs   Lab 10/03/22  1629 10/03/22  1006 10/03/22  0548 10/02/22  0824 10/02/22  0638 10/01/22  1021 10/01/22  0645 09/28/22  0809 09/28/22  0514 09/27/22  0950 09/27/22  0511   WBC  --   --   --   --  7.8  --   --   --  10.5  --  8.4   HGB  --   --   --   --  8.4*  --   --   --  8.4*  --  7.9*   MCV  --   --   --   --  104*  --   --   --  102*  --  105*   PLT  --   --   --   --  285  --   --   --  231  --  217   NA  --   --  137  --  136  --  132*   < > 133*  --  135*   POTASSIUM  --   --  4.4  --  4.2  --  4.7   < > 4.2  --  4.1   CHLORIDE  --   --  96*  --  96*  --  95*   < > 95*  --  91*   CO2  --   --  35*  --  34*  --  33*   < > 32*  --  39*   BUN  --   --  55.0*  --  41.3*  --  24.8*   < > 39.2*  --  63.4*   CR  --   --  2.45*  --  2.25*  --  1.71*   < > 1.44*  --  1.48*   ANIONGAP  --   --  6*  --  6*  --  4*   < > 6*  --  5*   ESTUARDO  --   --  9.5  --  8.9  --  8.8   < > 8.9  --  9.0   * 177* 136*   < > 137*   < > 132*   < > 143*   < > 173*   ALBUMIN  --   --  3.1*  --  3.0*  --  3.0*   < > 2.9*  --  3.1*   PROTTOTAL  --   --  5.1*  --  5.0*  --  5.1*   < > 5.1*  --  5.2*   BILITOTAL  --   --  0.2  --  0.2  --  0.2    < > 0.2  --  0.2   ALKPHOS  --   --  80  --  86  --  80   < > 86  --  85   ALT  --   --  12  --  12  --  9*   < > 13  --  12   AST  --   --  16  --  18  --  17   < > 20  --  21    < > = values in this interval not displayed.     No results found for this or any previous visit (from the past 24 hour(s)).  Medications     dextrose       - MEDICATION INSTRUCTIONS -       - MEDICATION INSTRUCTIONS -         azaTHIOprine  100 mg Per J Tube Daily     B and C vitamin Complex with folic acid  5 mL Oral Daily     calcium carbonate 600 mg-vitamin D 400 units  1 tablet Per J Tube BID w/meals     cyanocobalamin  500 mcg Per Feeding Tube Daily     dapsone  50 mg Per J Tube Q Mon Wed Fri AM     fiber modular (NUTRISOURCE FIBER)  1 packet Per Feeding Tube BID     [Held by provider] folic acid  1 mg Oral or Feeding Tube Daily     gabapentin  200 mg Oral At Bedtime     heparin ANTICOAGULANT  5,000 Units Subcutaneous Q12H     insulin aspart  1-6 Units Subcutaneous Q6H     [Held by provider] insulin glargine  6 Units Subcutaneous QAM AC     lidocaine  2 patch Transdermal Q24h    And     lidocaine   Transdermal Q8H TONY     menthol  1 patch Topical QAM    And     menthol   Transdermal Q8H     menthol   Transdermal Daily     metoprolol  12.5 mg Per J Tube BID     nystatin  1,000,000 Units Swish & Swallow 4x Daily     pantoprazole  40 mg Per J Tube BID     prednisoLONE  10 mg Per J Tube Daily     prednisoLONE  7.5 mg Per J Tube QPM     sodium chloride (PF)  3 mL Intracatheter Q8H     tacrolimus  4 mg Per J Tube BID IS     **a portion of my previous note is copied and pasted above, it has been edited as needed to reflect events for today**

## 2022-10-04 NOTE — PROGRESS NOTES
Pulmonary Medicine  Cystic Fibrosis - Lung Transplant Team  Progress Note  10/04/2022       Patient: Sofie Rodriguez  MRN: [id number]  : 1960 (age 62 year old)  Transplant: 2022 (Lung), POD#98  Admission date: 2022    Assessment & Plan:     Sofie Rodriguez is a 62-year-old female s/p BSLT (22) for COPD complicated by persistent bilateral pleural effusions, persistent CO2 retention, right hemidiaphragm palsy, BACILIO (dialysis -), C. diff colitis, gastroparesis s/p GJ tube placement (22), GI bleed 2/2 pyloric ulcer, hemobilia s/p ERCP and MRCP (), and persistent vasoplegia.  Other history notable for HFpEF, NTM colonoization, hepatitis C, and methamphetamine use.  Patient admitted 24 with persistent dyspnea (increased with activity), daily morning hemoptysis, and increased BLE edema.  Also noted to have persistent, increased bilateral pleural effusions, diffuse bilateral groundglass changes, and more focal appearing LLL infiltrates on imaging.  Treating with aggressive diuresis with some improvement in symptoms and hypoxia.  Hemoptysis resolved, mild intermittent hypoxia and ANAYA persisted.  S/p bilateral chest tubes.  Episode of AMS now resolved.  S/p tunneled line placed and iHD initiation ().  Anticipate discharge pending OP dialysis placement.      Today's recommendations:  - Exercise oximetry study ordered today  - CXR weekly (due 10/7, not yet ordered)  - Pulmonary follow up 1-2 weeks from discharge  - Tacrolimus level ordered 10/6   - Prednisone taper due 10/13 (not yet ordered)  - CMV, DSA, and EBV ordered tomorrow  - IgG level due 10/29 (not yet ordered)   - Titrate loperamide to 2-3 stools daily (without urgency)   - G tube to gravity drainage prn with GI symptoms (N/V, bloating, reflux); full liquid diet for comfort only  - EGD to check healing of ulcer and ERCP currently scheduled 10/7 (primary team to discuss timing with GI pending dispo and dialysis  plan)     S/p bilateral sequential lung transplant (BSLT) for end stage COPD:  Acute hypoxia with chronic hypercapnia:   Pulmonary edema:  Persistent bibasilar pleural effusions:   Right hemidiaphragm palsy: Admitted with increased dyspnea with minimal exertion and small volume daily hemoptysis.  Also with marked decline in PFTs (FEV1 1.22L, 50% on 8/18 to 0.98L, 40% on 9/7).  Chest CT (9/8) with increased effusions and groundglass changes.  DSA positive but stable (as below).  BNP markedly elevated (30k) and also with increased BLE edema.  Etiology unclear and is likely multi-factorial with DDx including pulmonary edema from hypervolemia/progressive HFpEF, rejection (ACR +/- AMR), alveolar hemorrhage, and/or infection.  Aggressively diuresed with some improvement in symptoms.  Bronch (9/13) unremarkable, BAL of lingula sent, cultures no growth (GPC on gram stain only).  S/p right pigtail chest tube placement (9/13-9/22), transudative with moderate output initially but quickly decreasing, cultures negative.  S/p aspiration of left pleural effusion (9/13) and then pigtail chest tube (9/15-9/29) s/p full lytic course (916-9/19), cultures also negative, clamped 9/26.  Chest CT (9/27) with grossly unchanged small bilateral hydroPTX with left chest tube coiled in inferior medial left pleural space, continued resolution of nodular opacity in posterior MARLON, and the lobes of the left upper and lower are rotated with respect to each other (discussed at transplant conference 9/29, no intervention).  Weaned to RA at rest on 9/24, using 1L NC overnight.  VBG with improved hypercapnia as of 9/29.  Repeat CXR (9/30) with stable small left pleural effusion.  Overnight oximetry (10/3) incomplete given need for 1L NC with desaturation.   - Supplemental O2 to keep >92%, 1L NC overnight, exercise oximetry study (ordered)  - Continue to defer NIPPV, repeat VBG prn with change in mentation/clinical status  - IS and Aerobika q1h w/a and  encourage OOB during the day as much as able  - CXR weekly (due 10/7, not yet ordered)  - Pulmonary follow up 1-2 weeks from discharge     Immunosuppression:  - Tacrolimus 4 mg BID (decreased 10/3).  Goal level 8-12.  Repeat level 10/6 (ordered).  - Azathioprine 100 mg daily (9/20, MMF stopped due to ongoing diarrhea)  - Prednisone 10 mg qAM / 7.5 mg qPM with next taper due 10/13 (not yet ordered)  Date AM dose (mg) PM dose (mg)   9/15/22 10 7.5   10/13/22 7.5 7.5   11/10/22 7.5 5   12/8/22 5 5   1/5/23 5 2.5      Prophylaxis:   - Dapsone qMWF for PJP ppx (resumed 9/19, monitor for worsening anemia; Bactrim stopped d/t hyperkalemia, will need to monitor for recurrence of anemia with dapsone; Pentamidine neb not tolerated on 9/7 after only 5 minutes d/t excessive coughing)  - Completed VGCV for CMV ppx 9/26 (through POD#90), D+/R+, CMV monitoring every other week (starting 10/5, ordered)      Positive DSA: Newly positive on 8/10 with DQB2 mfi 2155.   - Monitoring [id number], next due 10/5 (ordered), see below for trend:  Date DQB2 Notes   8/10 2155     9/1 7033 Current peak   9/21 5390 Most recent       EBV viremia: Low level (1374 on 9/7), not likely to be clinically significant.  - Follow EBV monthly (ordered 10/5)     Hypogammaglobulinemia: IgG adequate at time of transplant, repeat level low (336) on 7/28.  S/p IVIG 7/30, tolerated well.  IgG on 9/29 low at 559 but not indicating IVIG replacement.   - Follow IgG monthly (next due 10/29, not yet ordered)     Other relevant problems being managed by the primary team:     Encephalopathy, Resolved:  Tremors:   Presumed UTI: Noted 9/19 with confusion as well as tremor.  DDx including hypercapnia, hyperammonia, infection, uremia (see below), medication related.  VBG with worsening hypercapnia (99).  BUN elevated (112).  Ammonia normal (24).  CRP normal, procal 0.1.  UA with moderate blood, large leukocyte esterase, but UC (9/19) negative.  Blood culture (9/19) negative.   Tacro not supratherapeutic.  Head CT (9/20) without acute intracranial pathology.  AMS resolved ~9/22.  S/p ceftriaxone 5-day course (9/20-9/24).     ESRD based upon Cystatin C (GFR of 10): Nephrology consulted 9/21, see their note for details, with concern for CKD5 as Cr may suggest overestimation of kidney function with limited muscle mass, unclear if trend over the past week reflects BACILIO.  Cystatin C 4.3 with GFR 10.  Making urine.  Renal US (9/22) normal.  S/p tunneled line placed and HD initiated 9/26.  Management per nephrology.     Diarrhea: C diff negative on 9/10 and 9/20.  Likely medication related (MMF), but unable to transition to Myfortic given NPO.  Loperamide utilized with some benefit.   Enteric stool panel negative 9/16.  Transitioned from MMF to AZA as above.   - Goal to titrate to antidiarrheal 3 stools daily (without urgency), management per primary     Severe gastroparesis:   Pyloric ulcer: Gastric emptying study 7/20 with severe gastric emptying delay (95% retention at 4 hours), s/p GJ tube placement in IR 7/27.  S/p EGD (8/3) noted to have pyloric ulcer and excessive gastric fluid and residual food.  S/p EGD (8/11) with mild erythema 2/2 GJ tube trauma seen near insertion site but no active bleeding.  Repeat gastric emptying study 9/30 unfortunately with persistent severe retention (91% at 4h) and some fullness and nausea post study; defer adjustments to current plan and consider semi-permanent status as is.  - PPI BID, limit opioids  - TF continuous and ALL enteral medications via J tube  - G tube to gravity drainage prn with GI symptoms (N/V, bloating, reflux); full liquid diet for comfort only  - EGD to check healing of ulcer and ERCP currently scheduled 10/7 (primary team to discuss timing with GI pending dispo and dialysis plan)    We appreciate the excellent care provided by the [place] [id number] team.  Recommendations communicated via in person rounding and this note.  Will continue to  "follow along closely, please do not hesitate to call with any questions or concerns.    Patient discussed with Dr. Paredes.    Yuliet Aviles PA-C  Inpatient [facility]  Pulmonary CF/Transplant     Subjective & Interval History:     Remains on RA during the day, placed on 1L NC overnight quickly into oximetry study due to desaturation.  Denies dyspnea, limited somewhat by deconditioning.  Denies cough or sputum.  Abdominal fullness, improvement with venting G tube prn.  Stools remain loose, x2 yesterday, using prn Imodium.  Taking small sips of full liquids otherwise on continuous TF.  Using prn oxycodone for back pain.    Review of Systems:     C: No fever, no chills, + decreased weight  INTEGUMENTARY/SKIN: No rash or obvious new lesions  ENT/MOUTH: No sore throat, no sinus pain, no nasal congestion or drainage  RESP: See interval history  CV: No chest pain, no palpitations, + peripheral edema  GI: No nausea, no vomiting, no reflux symptoms  : No dysuria  MUSCULOSKELETAL: See interval history  ENDOCRINE: Blood sugars with adequate control  NEURO: No headache, no numbness or tingling  PSYCHIATRIC: Mood stable    Physical Exam:     All notes, images, and labs from past 24 hours (at minimum) were reviewed.    Vital signs:  Temp: 98.7  F (37.1  C) Temp src: Oral BP: 120/67 Pulse: 94   Resp: 18 SpO2: 100 % O2 Device: Nasal cannula Oxygen Delivery: 1 LPM Height: 158.8 cm (5' 2.5\") Weight: 68.5 kg (151 lb 1.6 oz)  I/O:     Intake/Output Summary (Last 24 hours) at 10/4/2022 1121  Last data filed at 10/4/2022 1100  Gross per 24 hour   Intake 1133 ml   Output 450 ml   Net 683 ml     Constitutional: Sitting up in bed, in no apparent distress.   HEENT: Eyes with pink conjunctivae, anicteric.  Oral mucosa moist without lesions.   PULM: Diminished air flow to bases bilaterally.  No crackles, no rhonchi, no wheezes.  Non-labored breathing on RA.  CV: Normal S1 and S2.  RRR.  No murmur, gallop, or rub.  1-2+ BLE edema.   ABD: NABS, " soft, nontender, nondistended.  PEG/J tube intact with G tube to gravity.    MSK: Moves all extremities.   NEURO: Alert, conversant.   SKIN: Warm, dry.  No rash on limited exam.   PSYCH: Mood stable.     Lines, Drains, and Devices:  Peripheral IV 09/10/22 Anterior;Left Lower forearm (Active)   Site Assessment WD 10/03/22 2100   Line Status Saline locked 10/03/22 2100   Dressing Intervention New dressing  09/10/22 0135   Phlebitis Scale 0-->no symptoms 10/03/22 2100   Infiltration Scale 0 10/02/22 2100   Number of days: 24       CVC Double Lumen Right Internal jugular Tunneled (Active)   Site Assessment WD 10/03/22 2100   External Cath Length (cm) 1 cm 10/03/22 1115   Dressing Type Chlorhexidine disk 10/03/22 1115   Dressing Status clean;dry;intact 10/03/22 2100   Dressing Intervention new dressing 10/03/22 1115   Dressing Change Due 10/10/22 10/03/22 2100   Line Necessity yes, meets criteria 10/03/22 2100   Blue - Status saline locked;cap changed 10/03/22 1115   Blue - Cap Change Due 10/05/22 09/28/22 1153   Red - Status saline locked;cap changed 10/03/22 1115   Red - Cap Change Due 10/05/22 09/28/22 1153   CVC Comment HD line 10/03/22 2100   Number of days: 8     Data:     LABS    CMP:   Recent Labs   Lab 10/04/22  1036 10/04/22  0817 10/04/22  0601 10/04/22  0431 10/03/22  1006 10/03/22  0548 10/02/22  0824 10/02/22  0638 10/01/22  1021 10/01/22  0645 09/28/22  0809 09/28/22  0514   NA  --   --  133*  --   --  137  --  136  --  132*   < > 133*   POTASSIUM  --   --  4.2  --   --  4.4  --  4.2  --  4.7   < > 4.2   CHLORIDE  --   --  95*  --   --  96*  --  96*  --  95*   < > 95*   CO2  --   --  33*  --   --  35*  --  34*  --  33*   < > 32*   ANIONGAP  --   --  5*  --   --  6*  --  6*  --  4*   < > 6*   * 131* 134* 148*   < > 136*   < > 137*   < > 132*   < > 143*   BUN  --   --  35.3*  --   --  55.0*  --  41.3*  --  24.8*   < > 39.2*   CR  --   --  2.05*  --   --  2.45*  --  2.25*  --  1.71*   < > 1.44*    GFRESTIMATED  --   --  27*  --   --  22*  --  24*  --  34*   < > 41*   ESTUARDO  --   --  8.9  --   --  9.5  --  8.9  --  8.8   < > 8.9   MAG  --   --   --   --   --   --   --  2.0  --   --   --  2.0   PHOS  --   --   --   --   --   --   --   --   --   --   --  3.2   PROTTOTAL  --   --  5.2*  --   --  5.1*  --  5.0*  --  5.1*   < > 5.1*   ALBUMIN  --   --  3.2*  --   --  3.1*  --  3.0*  --  3.0*   < > 2.9*   BILITOTAL  --   --  0.2  --   --  0.2  --  0.2  --  0.2   < > 0.2   ALKPHOS  --   --  90  --   --  80  --  86  --  80   < > 86   AST  --   --  18  --   --  16  --  18  --  17   < > 20   ALT  --   --  12  --   --  12  --  12  --  9*   < > 13    < > = values in this interval not displayed.     CBC:   Recent Labs   Lab 10/02/22  0638 09/28/22  0514   WBC 7.8 10.5   RBC 2.57* 2.65*   HGB 8.4* 8.4*   HCT 26.6* 27.0*   * 102*   MCH 32.7 31.7   MCHC 31.6 31.1*   RDW 20.1* 19.8*    231       INR: No lab results found in last 7 days.    Glucose:   Recent Labs   Lab 10/04/22  1036 10/04/22  0817 10/04/22  0601 10/04/22  0431 10/03/22  2211 10/03/22  1629   * 131* 134* 148* 185* 148*       Blood Gas:   Recent Labs   Lab 10/03/22  0547 09/29/22  0603   PHV 7.37 7.36   PCO2V 64* 60*   PO2V 71* 48*   HCO3V 37* 34*   LINDY 10.2* 6.7*   O2PER 20 0       Culture Data No results for input(s): CULT in the last 168 hours.    Virology Data:   Lab Results   Component Value Date    FLUAH1 Not Detected 09/13/2022    FLUAH3 Not Detected 09/13/2022    FP3006 Not Detected 09/13/2022    IFLUB Not Detected 09/13/2022    RSVA Not Detected 09/13/2022    RSVB Not Detected 09/13/2022    PIV1 Not Detected 09/13/2022    PIV2 Not Detected 09/13/2022    PIV3 Not Detected 09/13/2022    HMPV Not Detected 09/13/2022       Historical CMV results (last 3 of prior testing):  Lab Results   Component Value Date    CMVQNT Not Detected 09/30/2022    CMVQNT Not Detected 09/13/2022    CMVQNT Not Detected 09/07/2022     No results found for:  CMVLOG    Urine Studies    Recent Labs   Lab Test 09/19/22  2254 08/01/22  0319 07/08/22  0831   URINEPH 7.0 8.0* 5.5   NITRITE Negative Negative Negative   LEUKEST Large* Negative Negative   WBCU 29*  --  1       Most Recent [place] Pulmonary Function Testing (FVC/FEV1 only)  FVC-Pre   Date Value Ref Range Status   09/07/2022 1.01 L    09/01/2022 1.07 L    08/24/2022 1.23 L    08/18/2022 1.33 L      FVC-%Pred-Pre   Date Value Ref Range Status   09/07/2022 33 %    09/01/2022 35 %    08/24/2022 40 %    08/18/2022 43 %      FEV1-Pre   Date Value Ref Range Status   09/07/2022 0.98 L    09/01/2022 1.02 L    08/24/2022 1.17 L    08/18/2022 1.22 L      FEV1-%Pred-Pre   Date Value Ref Range Status   09/07/2022 40 %    09/01/2022 42 %    08/24/2022 48 %    08/18/2022 50 %        IMAGING    No results found for this or any previous visit (from the past 48 hour(s)).

## 2022-10-04 NOTE — PROGRESS NOTES
[facility]    Medicine Progress Note - Hospitalist Service, GOLD TEAM 10    Date of Admission:  9/9/2022    Assessment & Plan        62 year old female with pertinent hx of end stage COPD s/p bilateral sequential lung transplant (6/22) on triple IS ( MMF/Tacro/pred), pyloric ulcer, recent ERCP c/f hemobilia, gastroparesis s/p GJ tube placement and Percutaneous J tube presents for a planned admission from transplant pulmonology to work up antibody medicated rejection versus infection.     Today's updates  - no changes today   - dispo pending HD chair; not certain if MWF ot TTS chair; need to coordinate with outpat EGD+ERCP  Discharge items:  - ERCP and EGD will be planned same day as OP to reduce sedation greatly appr GI team asst w/coordination  - Prednisone taper due 10/13 (not yet ordered)    #Acute kidney injury AKIN stage II on CKD stage IIIb (POA)-->ESRD on HD  :: Patient had been variable GFRs 30-50s in the last few months. Most recent Cr trend 1.5-1.9 1.56 9/8/22 cystatin C obtained and GFR ~10  :: Renal consulted, cont iHD for now; uneventful placement of tunneled catheter by IR on 9/26   :: trend BMP strict input and output and daily body weight  :: Vitamin D level -25;  parathyroid hormone = 46; c/w Calcium and vit D      #Diarrhea likely secondary to tube feeding, not present on admission, improved  :: repeatedly ruled out for C. difficile colitis.   :: c/w fibers at 28 g and Imodium as needed.     #Acute hypoxic hypercarbic respiratory failure secondary to bibasilar pleural effusion  Presented with chest tubes bilaterally on top of end-stage COPD s/p bilateral sequential lung transplant on 6/22/2022 complicated by chronic respiratory acidosis with compensation by metabolic alkalosis, all are present on admission   This is the main problem that led to this admission, had bilateral chest tubes; patient had her right-sided chest tube removed on 9/22/2022 and  removal of Left-sided chest tube 9/29/2022   -DSA testing on 10/5/2022  -IgG level ordered for 9/29  -Vibha-Barr virus quantitative was ordered for 10/7/2022  -Continue 3 drug immunosuppression with azathioprine, prednisone, and tacrolimus  -Prednisone taper to be performed on 10/13/2022  -Continue oxycodone a prn pain  -Increased gabapentin to 200 mg nightly  -Continue BiPAP as detailed above  -Continue valganciclovir for cytomegalovirus prophylaxis through 9/28/2022 -- renally dosed for eGFR  -Continue dapsone for PJP prophylaxis  -Continue folic acid while on dapsone  -Continue nystatin.  Prophylaxis protocol post lung transplant  -consulted transplant pulmonology service        #Severe gastroparesis s/p  GJ tube placement 7/27/2022  #Significant delayed gastric emptying s/p gastrojejunostomy tube placement, present on admission  We will continue to utilize jejunal tube for nutrition. Ordered a follow-up nuclear medicine gastric emptying study for evaluation of any hopefully improvement in the patient's gastric emptying  :: repeat GE study 9/30/22, no major change  :: RD nutrition consult placed for TF  :: Percutaneous j tube in place with G venting to gravity      #Steroid-induced hyperglycemia and diabetes mellitus  :: continue insulin Lantus with insulin NovoLog sliding scale    #HTN  # A flutter with RVR/SVT  ::  Has recently completed zio patch.  :: Continue PTA metoprolol 50 mg BID    # Acute blood loss anemia secondary to pyloric ulcer on top of chronic anemia of chronic disease, all are present on admission  -Continue pantoprazole 40 mg twice daily  -Repeat endoscopy  10/13 to check healing of ulcer, sooner if hgb declines further    #Acute metabolic encephalopathy secondary to urinary tract infection, not clear if present on admission, ordered a total of 5-day course of ceftriaxone for which the patient encephalopathy resolved     # extra hepatic and intrahepatic biliary dilation c/f hemobilia   #  Intra ductal papillary mucinous neoplasm   :: planning for ERCP on 10/7  :: s/p ERCP 8/11 showed hemobilia.   :: Monitor LFTs      #Acute on chronic heart failure with preserved ejection fraction LVEF 65%, the patient was appropriately diuresed, follow-up as outpatient         Diet: Adult Formula Drip Feeding: Continuous Nepro with Carbsteady; Jejunostomy; Goal Rate: 40 ml/hr--okay to begin at goal once new formula arrives on unit.; mL/hr; Medication - Feeding Tube Flush Frequency: At least 15-30 mL water before and after med...  Snacks/Supplements Adult: Nepro Oral Supplement; Between Meals  Full Liquid Diet    DVT Prophylaxis: Heparin SQ  Dong Catheter: Not present  Central Lines: PRESENT  CVC Double Lumen Right Internal jugular Tunneled-Site Assessment: WDL  Cardiac Monitoring: None  Code Status: Full Code      Disposition Plan      Expected Discharge Date: 10/05/2022,  9:00 AM      Discharge Comments: Decision will need to be made as the patient would need dialysis after discharge or not.  The patient received dialysis on 9/27 and 9/28 while awaiting improvement of her kidney function.  Hopefully the patient would not need dialysis upon discharge. TBD -staying at the [facility] for another several months, so Checkr would probably work. She will need transportation.      The patient's care was discussed with the Patient and Pulmonary Consultant.    Greg Pritchard MD  Hospitalist Service, [address]  Securely message with the Vocera Web Console (learn more here)  Text page via [facility] Paging/Directory   Please see signed in provider for up to date coverage information      Clinically Significant Risk Factors Present on Admission                      ______________________________________________________________________    Interval History   No acute events overnight, states she is clinically improved, awaiting discharge     Data reviewed  today: I reviewed all medications, new labs and imaging results over the last 24 hours.     Physical Exam   Vital Signs: Temp: 97.7  F (36.5  C) Temp src: Oral BP: 120/75 Pulse: 94   Resp: 20 SpO2: 96 % O2 Device: Nasal cannula Oxygen Delivery: 1 LPM  Weight: 151 lbs 1.6 oz    Constitutional:Awake, alert, cooperative, no apparent distress  Respiratory: Clear to auscultation bilaterally  Cardiovascular: Regular rate and rhythm,   GI: Normal bowel sounds, soft, non-distended, non-tender  Skin/Integumen: No rashes, no cyanosis       Data   Recent Labs   Lab 10/04/22  1036 10/04/22  0817 10/04/22  0601 10/03/22  1006 10/03/22  0548 10/02/22  0824 10/02/22  0638 09/28/22  0809 09/28/22  0514   WBC  --   --   --   --   --   --  7.8  --  10.5   HGB  --   --   --   --   --   --  8.4*  --  8.4*   MCV  --   --   --   --   --   --  104*  --  102*   PLT  --   --   --   --   --   --  285  --  231   NA  --   --  133*  --  137  --  136   < > 133*   POTASSIUM  --   --  4.2  --  4.4  --  4.2   < > 4.2   CHLORIDE  --   --  95*  --  96*  --  96*   < > 95*   CO2  --   --  33*  --  35*  --  34*   < > 32*   BUN  --   --  35.3*  --  55.0*  --  41.3*   < > 39.2*   CR  --   --  2.05*  --  2.45*  --  2.25*   < > 1.44*   ANIONGAP  --   --  5*  --  6*  --  6*   < > 6*   ESTUARDO  --   --  8.9  --  9.5  --  8.9   < > 8.9   * 131* 134*   < > 136*   < > 137*   < > 143*   ALBUMIN  --   --  3.2*  --  3.1*  --  3.0*   < > 2.9*   PROTTOTAL  --   --  5.2*  --  5.1*  --  5.0*   < > 5.1*   BILITOTAL  --   --  0.2  --  0.2  --  0.2   < > 0.2   ALKPHOS  --   --  90  --  80  --  86   < > 86   ALT  --   --  12  --  12  --  12   < > 13   AST  --   --  18  --  16  --  18   < > 20    < > = values in this interval not displayed.     No results found for this or any previous visit (from the past 24 hour(s)).  Medications     dextrose       - MEDICATION INSTRUCTIONS -       - MEDICATION INSTRUCTIONS -         azaTHIOprine  100 mg Per J Tube Daily     B and C  vitamin Complex with folic acid  5 mL Oral Daily     calcium carbonate 600 mg-vitamin D 400 units  1 tablet Per J Tube BID w/meals     cyanocobalamin  500 mcg Per Feeding Tube Daily     dapsone  50 mg Per J Tube Q Mon Wed Fri AM     fiber modular (NUTRISOURCE FIBER)  1 packet Per Feeding Tube BID     [Held by provider] folic acid  1 mg Oral or Feeding Tube Daily     gabapentin  200 mg Oral At Bedtime     heparin ANTICOAGULANT  5,000 Units Subcutaneous Q12H     insulin aspart  1-6 Units Subcutaneous Q6H     [Held by provider] insulin glargine  6 Units Subcutaneous QAM AC     metoprolol  12.5 mg Per J Tube BID     nystatin  1,000,000 Units Swish & Swallow 4x Daily     pantoprazole  40 mg Per J Tube BID     [START ON 10/5/2022] predniSONE  10 mg Per J Tube Daily     predniSONE  7.5 mg Per J Tube QPM     sodium chloride (PF)  3 mL Intracatheter Q8H     tacrolimus  4 mg Per J Tube BID IS     **a portion of my previous note is copied and pasted above, it has been edited as needed to reflect events for today**

## 2022-10-04 NOTE — PLAN OF CARE
"    62 year old female with pertinent hx of end stage COPD s/p bilateral sequential lung transplant (6/28/22) on triple IS (MMF/Tacro/pred), pyloric ulcer, recent ERCP for hemobilia, gastroparesis s/p GJ tube placement and percutaneous J tube presents for a planned admission from transplant pulmonology to work up antibody medicated rejection versus infection.    Conner today:       Diarrhea x1 and imodium solution given x1 with good result.    3+Bilateral legs edema Dr pritchard notified. Dr. Pritchard ordered for Lymphedema wrap. Pt has both leg wrap on today.     Pt c/o left toes hurting. Gold 10 Dr. Pritchard notified and assessed pt at bedside.     Pt walked the hallway while off Oxygen with O2 sating >99% per OT.     Oxycodone PO given for chronic back pain with good relief. Pt refused lidocaine patch and Roboxan for chronic back pain.    HD scheduled for 8 AM tomorrow.    Tacro level ordered 10/6     CMV, DSA, and EBV ordered tomorrow.    IgG level check due 10/29 not ordered.     EGD to check healing of ulcer and ERCP currently scheduled 10/7    IS up to 750ML continue to encourage pulmonary hygiene. CXR weekly next due 10/7    Hgb check Q3days per Gold team.    Pt asked to be discharge later on the day so her daughter could pick her-up.     Pt tolerates 2 popsicle today.      Pt want to be remind to clamp her G-Tube to see how well she can tolerates drinking ensure.     /68 (BP Location: Right arm)   Pulse 81   Temp 98.5  F (36.9  C) (Oral)   Resp 18   Ht 1.588 m (5' 2.5\")   Wt 68.5 kg (151 lb 1.6 oz)   SpO2 100%   BMI 27.20 kg/m      Neuro: A&O x4, denied dizziness and nausea.   Respiratory:  IS up to 750ML with reminder. Wean off to RA.   Pt juan to walk with rehab off oxygen.  Cardiac: No tele order.  Had 2, 3+ edema in bilateral legs. Legs wrapped.     GI: Denied nausea, bowel sounds audible.  TF continued at 40cc/hr at goal and H2O flushed per ordered. Gt to gravity putting out 400cc of clear fluid this " AM. Pt want to be remind to clamp her GT to see how well she can tolerates drinking a can of ensure. Immodium given for diarrhea x1 with good result. Continue full liquid diet for comfort and pt tolerating well today.   : Had small voiding per pt reported.  Skin: See PCS for assessment and treatment of wounds and surgical incisions. .     Pain: Oxygen PRN given for chronic back pain with good relief.  Mobility: Ambulated in room independently and stable on his feet.   Social: No visitor during shift.    Plan:  Continue to monitor pain, VS, heart rhythm, fluid status, bowel status, cardiac and respiratory status.  Notify care team of changes in patient condition or other concerns. Potential discharge when they have HD chair secure.

## 2022-10-04 NOTE — CARE PLAN
[id number]    D: VSS. Tolerating room air. Up in room with SBA. Complained of abdominal discomfort/gas pains.   I: G-tube placed to gravity drainage. Meds given down J-tube. Encouraged activity.   A: Patient reported slight relief when G-tube opened. Tube remains to gravity.  P: Continue to monitor patient. Overnight oximetry study tonight. Possible discharge tomorrow. Notify Gold 10 with changes or concerns.

## 2022-10-05 ENCOUNTER — APPOINTMENT (OUTPATIENT)
Dept: OCCUPATIONAL THERAPY | Facility: CLINIC | Age: 62
DRG: 205 | End: 2022-10-05
Attending: INTERNAL MEDICINE
Payer: MEDICARE

## 2022-10-05 ENCOUNTER — APPOINTMENT (OUTPATIENT)
Dept: GENERAL RADIOLOGY | Facility: CLINIC | Age: 62
DRG: 205 | End: 2022-10-05
Attending: PHYSICIAN ASSISTANT
Payer: MEDICARE

## 2022-10-05 LAB
ALBUMIN SERPL BCG-MCNC: 3.4 G/DL (ref 3.5–5.2)
ALP SERPL-CCNC: 91 U/L (ref 35–104)
ALT SERPL W P-5'-P-CCNC: 7 U/L (ref 10–35)
ANION GAP SERPL CALCULATED.3IONS-SCNC: 9 MMOL/L (ref 7–15)
AST SERPL W P-5'-P-CCNC: 18 U/L (ref 10–35)
BACTERIA SPEC CULT: ABNORMAL
BILIRUB SERPL-MCNC: 0.2 MG/DL
BUN SERPL-MCNC: 51.7 MG/DL (ref 8–23)
CALCIUM SERPL-MCNC: 9.7 MG/DL (ref 8.8–10.2)
CHLORIDE SERPL-SCNC: 92 MMOL/L (ref 98–107)
CMV DNA SPEC NAA+PROBE-ACNC: NOT DETECTED IU/ML
CREAT SERPL-MCNC: 2.48 MG/DL (ref 0.51–0.95)
DEPRECATED HCO3 PLAS-SCNC: 34 MMOL/L (ref 22–29)
GFR SERPL CREATININE-BSD FRML MDRD: 22 ML/MIN/1.73M2
GLUCOSE BLDC GLUCOMTR-MCNC: 130 MG/DL (ref 70–99)
GLUCOSE BLDC GLUCOMTR-MCNC: 145 MG/DL (ref 70–99)
GLUCOSE BLDC GLUCOMTR-MCNC: 175 MG/DL (ref 70–99)
GLUCOSE BLDC GLUCOMTR-MCNC: 179 MG/DL (ref 70–99)
GLUCOSE SERPL-MCNC: 135 MG/DL (ref 70–99)
GRAM STAIN RESULT: ABNORMAL
GRAM STAIN RESULT: ABNORMAL
POTASSIUM SERPL-SCNC: 4.2 MMOL/L (ref 3.4–5.3)
PROT SERPL-MCNC: 5.6 G/DL (ref 6.4–8.3)
SODIUM SERPL-SCNC: 135 MMOL/L (ref 136–145)

## 2022-10-05 PROCEDURE — 250N000013 HC RX MED GY IP 250 OP 250 PS 637: Performed by: INTERNAL MEDICINE

## 2022-10-05 PROCEDURE — 80053 COMPREHEN METABOLIC PANEL: CPT

## 2022-10-05 PROCEDURE — 90937 HEMODIALYSIS REPEATED EVAL: CPT

## 2022-10-05 PROCEDURE — 86833 HLA CLASS II HIGH DEFIN QUAL: CPT | Performed by: PHYSICIAN ASSISTANT

## 2022-10-05 PROCEDURE — 71046 X-RAY EXAM CHEST 2 VIEWS: CPT

## 2022-10-05 PROCEDURE — 87799 DETECT AGENT NOS DNA QUANT: CPT | Performed by: PHYSICIAN ASSISTANT

## 2022-10-05 PROCEDURE — 250N000011 HC RX IP 250 OP 636: Performed by: STUDENT IN AN ORGANIZED HEALTH CARE EDUCATION/TRAINING PROGRAM

## 2022-10-05 PROCEDURE — 99233 SBSQ HOSP IP/OBS HIGH 50: CPT | Performed by: PHYSICIAN ASSISTANT

## 2022-10-05 PROCEDURE — 250N000009 HC RX 250: Performed by: NURSE PRACTITIONER

## 2022-10-05 PROCEDURE — 250N000012 HC RX MED GY IP 250 OP 636 PS 637: Performed by: NURSE PRACTITIONER

## 2022-10-05 PROCEDURE — 97535 SELF CARE MNGMENT TRAINING: CPT | Mod: GO

## 2022-10-05 PROCEDURE — 74018 RADEX ABDOMEN 1 VIEW: CPT | Mod: 26 | Performed by: RADIOLOGY

## 2022-10-05 PROCEDURE — 250N000013 HC RX MED GY IP 250 OP 250 PS 637

## 2022-10-05 PROCEDURE — 214N000001 HC R&B CCU UMMC

## 2022-10-05 PROCEDURE — 634N000001 HC RX 634: Performed by: INTERNAL MEDICINE

## 2022-10-05 PROCEDURE — 258N000003 HC RX IP 258 OP 636: Performed by: INTERNAL MEDICINE

## 2022-10-05 PROCEDURE — 250N000009 HC RX 250: Performed by: INTERNAL MEDICINE

## 2022-10-05 PROCEDURE — 250N000013 HC RX MED GY IP 250 OP 250 PS 637: Performed by: NURSE PRACTITIONER

## 2022-10-05 PROCEDURE — 36415 COLL VENOUS BLD VENIPUNCTURE: CPT | Performed by: PHYSICIAN ASSISTANT

## 2022-10-05 PROCEDURE — 99233 SBSQ HOSP IP/OBS HIGH 50: CPT | Performed by: INTERNAL MEDICINE

## 2022-10-05 PROCEDURE — 250N000012 HC RX MED GY IP 250 OP 636 PS 637: Performed by: PHYSICIAN ASSISTANT

## 2022-10-05 PROCEDURE — 74018 RADEX ABDOMEN 1 VIEW: CPT

## 2022-10-05 PROCEDURE — 71046 X-RAY EXAM CHEST 2 VIEWS: CPT | Mod: 26 | Performed by: RADIOLOGY

## 2022-10-05 PROCEDURE — 250N000013 HC RX MED GY IP 250 OP 250 PS 637: Performed by: STUDENT IN AN ORGANIZED HEALTH CARE EDUCATION/TRAINING PROGRAM

## 2022-10-05 PROCEDURE — 86832 HLA CLASS I HIGH DEFIN QUAL: CPT | Performed by: PHYSICIAN ASSISTANT

## 2022-10-05 RX ADMIN — DAPSONE 50 MG: 100 TABLET ORAL at 07:46

## 2022-10-05 RX ADMIN — HEPARIN SODIUM 5000 UNITS: 5000 INJECTION, SOLUTION INTRAVENOUS; SUBCUTANEOUS at 20:05

## 2022-10-05 RX ADMIN — HEPARIN SODIUM 5000 UNITS: 5000 INJECTION, SOLUTION INTRAVENOUS; SUBCUTANEOUS at 07:47

## 2022-10-05 RX ADMIN — GABAPENTIN 200 MG: 250 SOLUTION ORAL at 21:54

## 2022-10-05 RX ADMIN — OXYCODONE HYDROCHLORIDE 5 MG: 5 SOLUTION ORAL at 07:44

## 2022-10-05 RX ADMIN — INSULIN ASPART 1 UNITS: 100 INJECTION, SOLUTION INTRAVENOUS; SUBCUTANEOUS at 05:12

## 2022-10-05 RX ADMIN — LOPERAMIDE HCL 2 MG: 1 SOLUTION ORAL at 07:44

## 2022-10-05 RX ADMIN — CALCIUM CARBONATE 600 MG (1,500 MG)-VITAMIN D3 400 UNIT TABLET 1 TABLET: at 17:14

## 2022-10-05 RX ADMIN — INSULIN ASPART 1 UNITS: 100 INJECTION, SOLUTION INTRAVENOUS; SUBCUTANEOUS at 10:04

## 2022-10-05 RX ADMIN — INSULIN ASPART 1 UNITS: 100 INJECTION, SOLUTION INTRAVENOUS; SUBCUTANEOUS at 22:01

## 2022-10-05 RX ADMIN — PREDNISONE 7.5 MG: 5 SOLUTION ORAL at 20:05

## 2022-10-05 RX ADMIN — ORAL VEHICLES - SUSP 12.5 MG: SUSPENSION at 20:05

## 2022-10-05 RX ADMIN — CALCIUM CARBONATE 600 MG (1,500 MG)-VITAMIN D3 400 UNIT TABLET 1 TABLET: at 07:46

## 2022-10-05 RX ADMIN — Medication 5 ML: at 07:46

## 2022-10-05 RX ADMIN — NYSTATIN 1000000 UNITS: 100000 SUSPENSION ORAL at 20:06

## 2022-10-05 RX ADMIN — Medication 1 PACKET: at 20:05

## 2022-10-05 RX ADMIN — NYSTATIN 1000000 UNITS: 100000 SUSPENSION ORAL at 07:46

## 2022-10-05 RX ADMIN — Medication 1 PACKET: at 07:52

## 2022-10-05 RX ADMIN — Medication: at 08:20

## 2022-10-05 RX ADMIN — SODIUM CHLORIDE 250 ML: 9 INJECTION, SOLUTION INTRAVENOUS at 08:19

## 2022-10-05 RX ADMIN — SODIUM CHLORIDE 300 ML: 9 INJECTION, SOLUTION INTRAVENOUS at 08:19

## 2022-10-05 RX ADMIN — NYSTATIN 1000000 UNITS: 100000 SUSPENSION ORAL at 15:46

## 2022-10-05 RX ADMIN — Medication 40 MG: at 07:46

## 2022-10-05 RX ADMIN — EPOETIN ALFA-EPBX 5000 UNITS: 10000 INJECTION, SOLUTION INTRAVENOUS; SUBCUTANEOUS at 09:32

## 2022-10-05 RX ADMIN — NYSTATIN 1000000 UNITS: 100000 SUSPENSION ORAL at 12:03

## 2022-10-05 RX ADMIN — TACROLIMUS 4 MG: 5 CAPSULE ORAL at 07:45

## 2022-10-05 RX ADMIN — TACROLIMUS 4 MG: 5 CAPSULE ORAL at 17:09

## 2022-10-05 RX ADMIN — Medication 40 MG: at 20:06

## 2022-10-05 RX ADMIN — Medication 100 MG: at 07:46

## 2022-10-05 RX ADMIN — CYANOCOBALAMIN TAB 500 MCG 500 MCG: 500 TAB at 07:46

## 2022-10-05 RX ADMIN — PREDNISONE 10 MG: 5 SOLUTION ORAL at 07:45

## 2022-10-05 RX ADMIN — OXYCODONE HYDROCHLORIDE 5 MG: 5 SOLUTION ORAL at 20:06

## 2022-10-05 RX ADMIN — ORAL VEHICLES - SUSP 12.5 MG: SUSPENSION at 07:45

## 2022-10-05 ASSESSMENT — ACTIVITIES OF DAILY LIVING (ADL)
ADLS_ACUITY_SCORE: 27
ADLS_ACUITY_SCORE: 29

## 2022-10-05 NOTE — PROGRESS NOTES
Pulmonary Medicine  Cystic Fibrosis - Lung Transplant Team  Progress Note  10/05/2022       Patient: Sofie Rodriguez  MRN: [id number]  : 1960 (age 62 year old)  Transplant: 2022 (Lung), POD#99  Admission date: 2022    Assessment & Plan:     Sofie Rodriguez is a 62-year-old female s/p BSLT (22) for COPD complicated by persistent bilateral pleural effusions, persistent CO2 retention, right hemidiaphragm palsy, BACILIO (dialysis -), C. diff colitis, gastroparesis s/p GJ tube placement (22), GI bleed 2/2 pyloric ulcer, hemobilia s/p ERCP and MRCP (), and persistent vasoplegia.  Other history notable for HFpEF, NTM colonoization, hepatitis C, and methamphetamine use.  Patient admitted 22 with persistent dyspnea (increased with activity), daily morning hemoptysis, and increased BLE edema.  Also noted to have persistent, increased bilateral pleural effusions, diffuse bilateral groundglass changes, and more focal appearing LLL infiltrates on imaging.  Treating with aggressive diuresis with some improvement in symptoms and hypoxia.  Hemoptysis resolved, mild intermittent hypoxia and ANAYA persisted.  S/p bilateral chest tubes.  Episode of AMS now resolved.  S/p tunneled line placed and iHD initiation ().  Anticipate discharge pending OP dialysis placement.      Today's recommendations:  - Exercise oximetry study ordered, complete prior to discharge  - CXR weekly, ordered today  - Pulmonary follow up 1-2 weeks from discharge  - Tacrolimus level ordered tomorrow  - Prednisone taper due 10/13 (not yet ordered)  - CMV, DSA, and EBV (10/5) pending  - IgG level due 10/29 (not yet ordered)   - Titrate loperamide to 2-3 stools daily (without urgency)   - G tube to gravity drainage prn with GI symptoms (N/V, bloating, reflux); full liquid diet for comfort only  - AXR ordered given increased bloating and G tube drainage  - EGD to check healing of ulcer and ERCP currently scheduled 10/7  (primary team to discuss timing with GI pending dispo and dialysis plan)     S/p bilateral sequential lung transplant (BSLT) for end stage COPD:  Acute hypoxia with chronic hypercapnia:   Pulmonary edema:  Persistent bibasilar pleural effusions:   Right hemidiaphragm palsy: Admitted with increased dyspnea with minimal exertion and small volume daily hemoptysis.  Also with marked decline in PFTs (FEV1 1.22L, 50% on 8/18 to 0.98L, 40% on 9/7).  Chest CT (9/8) with increased effusions and groundglass changes.  DSA positive but stable (as below).  BNP markedly elevated (30k) and also with increased BLE edema.  Etiology unclear and is likely multi-factorial with DDx including pulmonary edema from hypervolemia/progressive HFpEF, rejection (ACR +/- AMR), alveolar hemorrhage, and/or infection.  Aggressively diuresed with some improvement in symptoms.  Bronch (9/13) unremarkable, BAL of lingula sent, cultures no growth (GPC on gram stain only).  S/p right pigtail chest tube placement (9/13-9/22), transudative with moderate output initially but quickly decreasing, cultures negative.  S/p aspiration of left pleural effusion (9/13) and then pigtail chest tube (9/15-9/29) s/p full lytic course (916-9/19), cultures also negative, clamped 9/26.  Chest CT (9/27) with grossly unchanged small bilateral hydroPTX with left chest tube coiled in inferior medial left pleural space, continued resolution of nodular opacity in posterior MARLON, and the lobes of the left upper and lower are rotated with respect to each other (discussed at transplant conference 9/29, no intervention).  Weaned to RA at rest on 9/24, using 1L NC overnight.  VBG with improved hypercapnia as of 9/29.  Repeat CXR (9/30) with stable small left pleural effusion.  Overnight oximetry (10/3) incomplete given need for 1L NC with desaturation.   - Supplemental O2 to keep >92%, 1L NC overnight, exercise oximetry study (ordered), complete prior to discharge  - Continue to  defer NIPPV, repeat VBG prn with change in mentation/clinical status  - IS and Aerobika q1h w/a and encourage OOB during the day as much as able  - CXR weekly (ordered 10/5)  - Pulmonary follow up 1-2 weeks from discharge     Immunosuppression:  - Tacrolimus 4 mg BID (decreased 10/3).  Goal level 8-12.  Repeat level 10/6 (ordered).  - Azathioprine 100 mg daily (9/20, MMF stopped due to ongoing diarrhea)  - Prednisone 10 mg qAM / 7.5 mg qPM with next taper due 10/13 (not yet ordered)  Date AM dose (mg) PM dose (mg)   9/15/22 10 7.5   10/13/22 7.5 7.5   11/10/22 7.5 5   12/8/22 5 5   1/5/23 5 2.5      Prophylaxis:   - Dapsone qMWF for PJP ppx (resumed 9/19, monitor for worsening anemia; Bactrim stopped d/t hyperkalemia, will need to monitor for recurrence of anemia with dapsone; Pentamidine neb not tolerated on 9/7 after only 5 minutes d/t excessive coughing)  - Completed VGCV for CMV ppx 9/26 (through POD#90), D+/R+, CMV monitoring every other week (pending 10/5)      Positive DSA: Newly positive on 8/10 with DQB2 mfi 2155.   - Monitoring [id number], (10/5) pending, see below for trend:  Date DQB2 Notes   8/10 2155     9/1 7033 Current peak   9/21 5390 Most recent       EBV viremia: Low level (1374 on 9/7), not likely to be clinically significant.  - Follow EBV monthly (pending 10/5)     Hypogammaglobulinemia: IgG adequate at time of transplant, repeat level low (336) on 7/28.  S/p IVIG 7/30, tolerated well.  IgG on 9/29 low at 559 but not indicating IVIG replacement.   - Follow IgG monthly (next due 10/29, not yet ordered)     Other relevant problems being managed by the primary team:     Encephalopathy, Resolved:  Tremors:   Presumed UTI: Noted 9/19 with confusion as well as tremor.  DDx including hypercapnia, hyperammonia, infection, uremia (see below), medication related.  VBG with worsening hypercapnia (99).  BUN elevated (112).  Ammonia normal (24).  CRP normal, procal 0.1.  UA with moderate blood, large leukocyte  esterase, but UC (9/19) negative.  Blood culture (9/19) negative.  Tacro not supratherapeutic.  Head CT (9/20) without acute intracranial pathology.  AMS resolved ~9/22.  S/p ceftriaxone 5-day course (9/20-9/24).     ESRD based upon Cystatin C (GFR of 10): Nephrology consulted 9/21, see their note for details, with concern for CKD5 as Cr may suggest overestimation of kidney function with limited muscle mass, unclear if trend over the past week reflects BACILIO.  Cystatin C 4.3 with GFR 10.  Making urine.  Renal US (9/22) normal.  S/p tunneled line placed and HD initiated 9/26.  Management per nephrology.     Diarrhea: C diff negative on 9/10 and 9/20.  Likely medication related (MMF), but unable to transition to Myfortic given NPO.  Loperamide utilized with some benefit.   Enteric stool panel negative 9/16.  Transitioned from MMF to AZA as above.   - Goal to titrate to antidiarrheal 3 stools daily (without urgency), management per primary     Severe gastroparesis:   Pyloric ulcer: Gastric emptying study 7/20 with severe gastric emptying delay (95% retention at 4 hours), s/p GJ tube placement in IR 7/27.  S/p EGD (8/3) noted to have pyloric ulcer and excessive gastric fluid and residual food.  S/p EGD (8/11) with mild erythema 2/2 GJ tube trauma seen near insertion site but no active bleeding.  Repeat gastric emptying study 9/30 unfortunately with persistent severe retention (91% at 4h) and some fullness and nausea post study; defer adjustments to current plan and consider semi-permanent status as is.  - PPI BID, limit opioids  - TF continuous and ALL enteral medications via J tube  - G tube to gravity drainage prn with GI symptoms (N/V, bloating, reflux); full liquid diet for comfort only  - AXR given increased bloating and G tube drainage (ordered)  - EGD to check healing of ulcer and ERCP currently scheduled 10/7 (primary team to discuss timing with GI pending dispo and dialysis plan)    We appreciate the excellent  "care provided by the [place] [id number] team.  Recommendations communicated via in person rounding and this note.  Will continue to follow along closely, please do not hesitate to call with any questions or concerns.    Patient discussed with Dr. Paredes.    Yuliet Aviles PA-C  Inpatient [facility]  Pulmonary CF/Transplant     Subjective & Interval History:     Remains on RA while awake during the day and 1L NC overnight.  Breathing feeling comfortable, no cough/sputum, denies chest congestion.  Ongoing abdominal fullness and more gas today, intermittently venting G tube.  Denies N/V or reflux.  Remains on TF with minimal full liquid PO intake.  \"Mushy\" stools x2 yesterday, used Imodium once.      Review of Systems:     C: No fever, no chills, no change in weight  INTEGUMENTARY/SKIN: No rash or obvious new lesions  ENT/MOUTH: No sore throat, no sinus pain, no nasal congestion or drainage  RESP: See interval history  CV: No chest pain, no palpitations, + peripheral edema  GI: See interval history  : No dysuria  MUSCULOSKELETAL: + ongoing back pain  ENDOCRINE: Blood sugars with adequate control  NEURO: No headache, no numbness or tingling  PSYCHIATRIC: Mood stable    Physical Exam:     All notes, images, and labs from past 24 hours (at minimum) were reviewed.    Vital signs:  Temp: 98.8  F (37.1  C) Temp src: Oral BP: 113/69 Pulse: 97   Resp: 19 SpO2: 97 % O2 Device: None (Room air) Oxygen Delivery: 1 LPM Height: 158.8 cm (5' 2.5\") Weight: 69.9 kg (154 lb 3.2 oz)  I/O:     Intake/Output Summary (Last 24 hours) at 10/5/2022 1108  Last data filed at 10/5/2022 0829  Gross per 24 hour   Intake 1113 ml   Output 1100 ml   Net 13 ml     Constitutional: Lying in bed at dialysis, in no apparent distress.   HEENT: Eyes with pink conjunctivae, anicteric.  Oral mucosa moist without lesions.   PULM: Diminished air flow to bases bilaterally.  No crackles, no rhonchi, no wheezes.  Non-labored breathing on RA.  CV: Normal S1 and S2.  RRR. "  2+ BLE edema (wrapped).   ABD: NABS, soft, nontender, nondistended.  GJ tube with G tube to gravity with tan/brown output.   MSK: Moves all extremities.  No apparent muscle wasting.   NEURO: Alert, conversant.   SKIN: Warm, dry.  No rash on limited exam.   PSYCH: Mood stable.     Lines, Drains, and Devices:  Peripheral IV 09/10/22 Anterior;Left Lower forearm (Active)   Site Assessment WDL 10/05/22 0759   Line Status Saline locked 10/05/22 0759   Dressing Intervention New dressing  09/10/22 0135   Phlebitis Scale 0-->no symptoms 10/05/22 0759   Infiltration Scale 0 10/05/22 0759   Number of days: 25       CVC Double Lumen Right Internal jugular Tunneled (Active)   Site Assessment WDL 10/05/22 0812   External Cath Length (cm) 1 cm 10/03/22 1115   Dressing Type Chlorhexidine disk 10/03/22 1115   Dressing Status clean;dry;intact 10/05/22 0812   Dressing Intervention new dressing 10/03/22 1115   Dressing Change Due 10/10/22 10/05/22 0812   Line Necessity yes, meets criteria 10/04/22 2100   Blue - Status blood return noted 10/05/22 0812   Blue - Cap Change Due 10/05/22 09/28/22 1153   Red - Status blood return noted 10/05/22 0812   Red - Cap Change Due 10/05/22 09/28/22 1153   CVC Comment HD line 10/04/22 2100   Number of days: 9     Data:     LABS    CMP:   Recent Labs   Lab 10/05/22  1002 10/05/22  0615 10/05/22  0511 10/04/22  2305 10/04/22  0817 10/04/22  0601 10/03/22  1006 10/03/22  0548 10/02/22  0824 10/02/22  0638   NA  --  135*  --   --   --  133*  --  137  --  136   POTASSIUM  --  4.2  --   --   --  4.2  --  4.4  --  4.2   CHLORIDE  --  92*  --   --   --  95*  --  96*  --  96*   CO2  --  34*  --   --   --  33*  --  35*  --  34*   ANIONGAP  --  9  --   --   --  5*  --  6*  --  6*   * 135* 145* 170*   < > 134*   < > 136*   < > 137*   BUN  --  51.7*  --   --   --  35.3*  --  55.0*  --  41.3*   CR  --  2.48*  --   --   --  2.05*  --  2.45*  --  2.25*   GFRESTIMATED  --  22*  --   --   --  27*  --  22*  --   24*   ESTUARDO  --  9.7  --   --   --  8.9  --  9.5  --  8.9   MAG  --   --   --   --   --   --   --   --   --  2.0   PROTTOTAL  --  5.6*  --   --   --  5.2*  --  5.1*  --  5.0*   ALBUMIN  --  3.4*  --   --   --  3.2*  --  3.1*  --  3.0*   BILITOTAL  --  0.2  --   --   --  0.2  --  0.2  --  0.2   ALKPHOS  --  91  --   --   --  90  --  80  --  86   AST  --  18  --   --   --  18  --  16  --  18   ALT  --  7*  --   --   --  12  --  12  --  12    < > = values in this interval not displayed.     CBC:   Recent Labs   Lab 10/02/22  0638   WBC 7.8   RBC 2.57*   HGB 8.4*   HCT 26.6*   *   MCH 32.7   MCHC 31.6   RDW 20.1*          INR: No lab results found in last 7 days.    Glucose:   Recent Labs   Lab 10/05/22  1002 10/05/22  0615 10/05/22  0511 10/04/22  2305 10/04/22  1635 10/04/22  1036   * 135* 145* 170* 167* 151*       Blood Gas:   Recent Labs   Lab 10/03/22  0547 09/29/22  0603   PHV 7.37 7.36   PCO2V 64* 60*   PO2V 71* 48*   HCO3V 37* 34*   LINDY 10.2* 6.7*   O2PER 20 0       Culture Data No results for input(s): CULT in the last 168 hours.    Virology Data:   Lab Results   Component Value Date    FLUAH1 Not Detected 09/13/2022    FLUAH3 Not Detected 09/13/2022    NH8150 Not Detected 09/13/2022    IFLUB Not Detected 09/13/2022    RSVA Not Detected 09/13/2022    RSVB Not Detected 09/13/2022    PIV1 Not Detected 09/13/2022    PIV2 Not Detected 09/13/2022    PIV3 Not Detected 09/13/2022    HMPV Not Detected 09/13/2022       Historical CMV results (last 3 of prior testing):  Lab Results   Component Value Date    CMVQNT Not Detected 09/30/2022    CMVQNT Not Detected 09/13/2022    CMVQNT Not Detected 09/07/2022     No results found for: CMVLOG    Urine Studies    Recent Labs   Lab Test 09/19/22  2254 08/01/22  0319 07/08/22  0831   URINEPH 7.0 8.0* 5.5   NITRITE Negative Negative Negative   LEUKEST Large* Negative Negative   WBCU 29*  --  1       Most Recent [place] Pulmonary Function Testing (FVC/FEV1  only)  FVC-Pre   Date Value Ref Range Status   09/07/2022 1.01 L    09/01/2022 1.07 L    08/24/2022 1.23 L    08/18/2022 1.33 L      FVC-%Pred-Pre   Date Value Ref Range Status   09/07/2022 33 %    09/01/2022 35 %    08/24/2022 40 %    08/18/2022 43 %      FEV1-Pre   Date Value Ref Range Status   09/07/2022 0.98 L    09/01/2022 1.02 L    08/24/2022 1.17 L    08/18/2022 1.22 L      FEV1-%Pred-Pre   Date Value Ref Range Status   09/07/2022 40 %    09/01/2022 42 %    08/24/2022 48 %    08/18/2022 50 %        IMAGING    No results found for this or any previous visit (from the past 48 hour(s)).

## 2022-10-05 NOTE — PLAN OF CARE
D: Admitted 9/9 for increased ANAYA and daily hemoptysis to work up antibody medicated rejection versus infection. PMH of BSLT (6/22), pleural effusions, & severe gastroparesis s/p GJ tube placement 7/27.     I: Monitored vitals and assessed pt status.   Changed: compression stockings on while awake, no longer using lymph wraps.  Running: J tube- continuous TF @ 40mL/hr, G tube vented to gravity.  PRN: 5mg Oxycodone given this AM for back pain with good relief. Imodium this AM x1.  Tele: no tele ordered  O2: RA, 1L NA while sleeping  Mobility: SBA    A: A0x4. VSS. Afebrile. Urinating adequately. C/o back pain this AM. Dialysis this AM with 2L removed.    P: Continue to monitor Pt status and report changes to [facility]. Discharge pending outpatient dialysis placement.    Temp:  [97.9  F (36.6  C)-98.8  F (37.1  C)] 98.5  F (36.9  C)  Pulse:  [] 98  Resp:  [11-43] 18  BP: (111-136)/(68-86) 122/73  Cuff Mean (mmHg):  [92] 92  SpO2:  [89 %-100 %] 93 %

## 2022-10-05 NOTE — PLAN OF CARE
A: Admitted 9/9 for increased ANAYA and daily hemoptysis to work up antibody medicated rejection versus infection. PMH of BSLT (6/22), pleural effusions, & severe gastroparesis s/p GJ tube placement 7/27.     Neuro: A/Ox4. Calls appropriately.   Cardiac/Tele:  VSS. No tele orders.  Respiratory: Sats >95% on RA while awake. While asleep, sats dropped into the 80s. Required 1L O2 via NC.  GI/: Continent. Oliguric. BM x1.  Diet/Appetite: Full liquid diet. J tube- infusing continuous drip feedings at 40 ml/hr. G tube venting to gravity PRN.  LDAs: Left PIV- SL.   Activity: SBA/Independent  Pain: C/o back pain. PRN Oxycodone given at 2030. No c/o nausea, numbness, or tingling this shift.     P: Discharge 10/5 pending HD chair. Continue to monitor and notify [facility] with changes.

## 2022-10-05 NOTE — PROGRESS NOTES
CLINICAL NUTRITION SERVICES - REASSESSMENT NOTE     Nutrition Prescription    RECOMMENDATIONS FOR MDs/PROVIDERS TO ORDER:  1. Adjust free water flushes via feeding tube as needed, pending fluid status. Free water flushes are minimal, or for patency. Monitor for volume depletion. TF formula is concentrated (881 mL H2O/day via TF formula + H2O flushes).   2. Dilute suspension medication, as able, to possibly help further decrease stooling. Continue to administer Imodium as needed.     Malnutrition Status:    Patient does not meet two of the established criteria necessary for diagnosing malnutrition but is at risk for malnutrition    Recommendations already ordered by Registered Dietitian (RD):  None at this time.     Future/Additional Recommendations:  1. Diet order as per team and pending GI status.   2. Continue TF regimen. K+ 4.2 today, 10/5 (was 4.7 on 9/16, 5 on 9/15, 5.7 on 9/14). Monitor need to adjust. Follow protein provisions.  3. Monitor glycemic control. Hgb A1c of 5.8 on 6/28/2022. BG was 179 on 10/5.   4. Monitor potential need for additional vitamin D supplementation in the future (vitamin D deficiency lab of 25 on 9/26). Continue calcium/vitamin D as pt on prednisone.    5. Agree with holding folic acid supplementation. Pt's folic acid lab was greater than 40 on 9/1.  6. Continue vitamin B12 supplementation (methylmalonic acid was 0.61 on 9/10/2022).  7. Continue nephronex, as ordered, to help meet micronutrient needs on dialysis.      EVALUATION OF THE PROGRESS TOWARD GOALS   Diet: Full liquid diet (for comfort). Has orders for Nepro at 10:00 and additional prn.      Nutrition Support:    - Feeding Tube (FT) access: S/p GJ tube placement on 7/27/22. J-tube for TFs. G tube vented to gravity per RN.  - TF regimen:   9/10-9/14: Novasource Renal @ 35 ml/hr (840 ml) + 1 pkt Prosource provides 1720 kcal (30 kcal/kg), 87 g pro (1.5 gm Pro/kg), 154 g CHO, 602 ml free water, and 0 g fiber daily.    9/14-current: Nepro @ goal 40 ml/hr (960 ml/day) to provide 1728 kcals (31 kcal/kg/day), 78 g PRO (1.4 g/kg/day), 701 ml free H2O, 155 g CHO and 24+ g Fiber daily.   - TF modulars: 1 pkt Nutrisource Fiber BID ordered 9/29.   - Feeding Tube Flushes: 30 mL Q 4 hrs  - Intake: Pt received a seven-day TF average which provided 1402 kcals and 63 g protein which meets kcal needs but does not meet protein needs. Less than goal TF received with TF interruptions and possibly not all TF intake recorded.     NEW FINDINGS   GI: Having zero to one stool daily. Last stool was on 10/5. Stools are loose and watery/brown. Receiving Imodium one to two times daily. Has a prn zofran order. Gastric empyting study on 9/30 indicated severe delayed gastric emptying. AXR on 10/5 ordered due to bloating and increased gastric output. Possible ERCP on 10/7.   Wt Hx: 67.4 kg (6/30/21), 62.6 kg (11/11/21), 65.7 kg (6/28/2022), 70.5 kg (8/16/2022), 73.4 kg (9/9, admit), 69.1 kg (9/16), 68.4 kg (9/22), 69.9 kg (10/5) - Pt received lasix and bumex this admission, wt has decreased. Pt has lost 4.8% of her body wt since admission.      ASSESSED NUTRITION NEEDS (updated)  Dosing Weight: 56 kg (using 6/28 wt of 65.7kg, adjusted, as likely drier wt and IBW of 52.3 kg)  Estimated Energy Needs: 9731-5010+ kcals/day (25-30+ kcals/kg)   Justification: Maintenance needs, rec high end of range with dialysis  Estimated Protein Needs:  grams protein/day (1.2-1.8 grams of pro/kg)   Justification: Increased needs on dialysis  Estimated Fluid Needs: 4421-8910 mL/day (25 - 30 mL/kg)   Justification: Maintenance needs or per provider pending fluid status     MALNUTRITION  % Intake: No decreased intake noted  % Weight Loss: Weight loss does not meet criteria     Subcutaneous Fat Loss: None observed per prior RD note  Muscle Loss: None observed per prior RD note  Fluid Accumulation/Edema: Mild  Malnutrition Diagnosis: Patient does not meet two of the  established criteria necessary for diagnosing malnutrition but is at risk for malnutrition    Previous Goals   Total avg nutritional intake to meet a minimum of 25 kcal/kg and 1.2 g PRO/kg daily (per dosing wt 56 kg).  Evaluation: Meeting kcal needs but not protein needs.      Previous Nutrition Diagnosis  Inadequate energy intake related to less than goal TF received as evidenced by pt received a seven-day TF average which provided 1242 kcals and 53 gm protein, which does not pt's estimated needs of 8777-6971+ kcals/day (25-30+ kcals/kg) and 67-84 grams protein/day (1.2-1.5 grams of pro/kg).   Evaluation: Resolved. Changed to new nutrition dx    CURRENT NUTRITION DIAGNOSIS  Inadequate protein intake related to less than goal TF received as evidenced by seven-day TF average which provided 63 g protein while estimated needs are   grams protein/day (1.2-1.8 grams of pro/kg).    INTERVENTIONS  Implementation  None additionally at this time.     Goals  Total avg nutritional intake to meet 4743-0787+ kcals/day (25-30+ kcals/kg) and  grams protein/day (1.2-1.8 grams of pro/kg).    Monitoring/Evaluation  Progress toward goals will be monitored and evaluated per protocol.     Nutrition will continue to follow.     Kathie Bateman, MS, RD, LD, [facility]   6C Pgr: [phone]

## 2022-10-05 NOTE — PROGRESS NOTES
Nephrology Progress Note  10/05/2022     Ms Rodriguez is a 61 yo female with PMH end stage COPD s/p bilateral sequential lung transplant (6/22) on triple IS ( MMF/Tacro/pred), pyloric ulcer, recent ERCP c/f hemobilia, gastroparesis s/p GJ tube placement and Percutaneous J tube , BACILIO requiring short term HD, admitted 9/9/22 for planned admission from transplant pulmonology to work up antibody medicated rejection versus infection. She was found to have ESRD with Cystatin C GFR of 10 ml/mn.      Assessment & Recommendations:     ESRD based upon Cystatin C ( GFR of 10) - Creat has remained stable in the mid to upper 1 range, but BUN was elevated out of proportion to her creat.    - It was felt that patient's symptoms of fatigue, poor appetite, recent confusion and elevated BUN indicated poor renal function. Given low muscle mass Cystatin C was obtained and found to be 10 ml/mn   - Admission BUN was 82.3. She peaked at 112 on 9/20.    - Tunneled HD line placed 9/26/22   - Patient initiated HD 9/26/22.   -  Will follow MWF schedule, pending OP HD placement         Volume status - Lower extremity edema is improving. Diarrhea persists and felt to be 2/2 TF. She is making less urine. Admission weight 73.4 kg (baseline weight ~ 60-62 kg, per pt). Has output from abd drain as well   - 2 L UF today   - Please get standing pre run weights   - Continue strict I/O    CV - Pre run b/ps 120-130/. 1+ LE edema. On Metoprolol 12.5 mg bid    Lung transplant IS: AZa, Pred, Tac. Tac level 11.5   - Per transplant      BMD - Ca 9's, albumin 3's   - PTH 46, Vit D 25 ( 9/22)   - Continue Ca/D    Anemia - Hgb 8.4   - Last RBC 8/31   - Pyloric ulcer per EGD 8/3/22   - Ferritin 769, Fe 54 IS 22 ( 9/22)   - On B12, folic acid, PPI   - Began Epo 5000 U IV q run 9/27    9. 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  Seen on dialysis, stable run for 2 kg. No n/v, CP, SOB, chills    Review of Systems:   4 point ROS neg other than as noted above    Physical Exam:   I/O last 3 completed shifts:  In: 1213 [I.V.:3; NG/GT:370]  Out: 3325 [Urine:150; Emesis/NG output:825; Other:2150; Stool:200]   /73 (BP Location: Left arm)   Pulse 98   Temp 98.5  F (36.9  C) (Oral)   Resp 18   Ht 1.588 m (5' 2.5\")   Wt 69.9 kg (154 lb 3.2 oz)   SpO2 93%   BMI 27.75 kg/m       GENERAL APPEARANCE: Comfortable on HD.   EYES:  no scleral icterus, pupils equal  PULM: lungs CTA. Breathing is non labored. Off supplemental oxygen.   CV: tachy      -edema- legs wrapped   GI: soft, NT, distended, abd drain.   INTEGUMENT: no  rash  NEURO:  Alert/interactive  Access - Right TDC    Labs:   All labs reviewed by me  Electrolytes/Renal -   Recent Labs   Lab Test 10/05/22  1544 10/05/22  1002 10/05/22  0615 10/04/22  0817 10/04/22  0601 10/03/22  1006 10/03/22  0548 10/02/22  0824 10/02/22  0638 09/28/22  0809 09/28/22  0514 09/27/22  0950 09/27/22  0511 09/26/22  0913 09/26/22  0555   NA  --   --  135*  --  133*  --  137  --  136   < > 133*  --  135*  --  137   POTASSIUM  --   --  4.2  --  4.2  --  4.4  --  4.2   < > 4.2  --  4.1  --  4.9   CHLORIDE  --   --  92*  --  95*  --  96*  --  96*   < > 95*  --  91*  --  87*   CO2  --   --  34*  --  33*  --  35*  --  34*   < > 32*  --  39*  --  44*   BUN  --   --  51.7*  --  35.3*  --  55.0*  --  41.3*   < > 39.2*  --  63.4*  --  96.7*   CR  --   --  2.48*  --  2.05*  --  2.45*  --  2.25*   < > 1.44*  --  1.48*  --  1.72*   * 179* 135*   < > 134*   < > 136*   < > 137*   < > 143*   < > 173*   < > 148*   ESTUARDO  --   --  9.7  --  8.9  --  9.5  --  8.9   < > 8.9  --  9.0  --  9.4   MAG  --   --   --   --   --   --   --   --  2.0  --  2.0  --  2.3  --  2.7*   PHOS  --   --   --   --   --   --   --   --   --  "  --  3.2  --  3.5  --  3.7    < > = values in this interval not displayed.       CBC -   Recent Labs   Lab Test 10/02/22  0638 09/28/22  0514 09/27/22  0511   WBC 7.8 10.5 8.4   HGB 8.4* 8.4* 7.9*    231 217       LFTs -   Recent Labs   Lab Test 10/05/22  0615 10/04/22  0601 10/03/22  0548   ALKPHOS 91 90 80   BILITOTAL 0.2 0.2 0.2   ALT 7* 12 12   AST 18 18 16   PROTTOTAL 5.6* 5.2* 5.1*   ALBUMIN 3.4* 3.2* 3.1*       Iron Panel -   Recent Labs   Lab Test 09/26/22  0555 09/03/22  1039 08/24/22  0810   IRON 54 21* 41   IRONSAT 22 9* 21   CARLOS 769* 343* 334*         Imaging:    Current Medications:    azaTHIOprine  100 mg Per J Tube Daily     B and C vitamin Complex with folic acid  5 mL Oral Daily     calcium carbonate 600 mg-vitamin D 400 units  1 tablet Per J Tube BID w/meals     cyanocobalamin  500 mcg Per Feeding Tube Daily     dapsone  50 mg Per J Tube Q Mon Wed Fri AM     fiber modular (NUTRISOURCE FIBER)  1 packet Per Feeding Tube BID     [Held by provider] folic acid  1 mg Oral or Feeding Tube Daily     gabapentin  200 mg Oral At Bedtime     heparin ANTICOAGULANT  5,000 Units Subcutaneous Q12H     insulin aspart  1-6 Units Subcutaneous Q6H     [Held by provider] insulin glargine  6 Units Subcutaneous QAM AC     metoprolol  12.5 mg Per J Tube BID     nystatin  1,000,000 Units Swish & Swallow 4x Daily     pantoprazole  40 mg Per J Tube BID     predniSONE  10 mg Per J Tube Daily     predniSONE  7.5 mg Per J Tube QPM     sodium chloride (PF)  3 mL Intracatheter Q8H     tacrolimus  4 mg Per J Tube BID IS       dextrose       - MEDICATION INSTRUCTIONS -       - MEDICATION INSTRUCTIONS -       RABIA Moctezuma

## 2022-10-05 NOTE — PROGRESS NOTES
HEMODIALYSIS TREATMENT NOTE    Date: 10/5/2022  Time: 8:51 AM    Data:  Pre Wt:  69.9 kg   Desired Wt: 67.9 kg  Post Wt:  67.9 kg  Weight change:   kg  Ultrafiltration - Post Run Net Total Removed (mL): 2000 mL  Vascular Access Status: CVC  Patent, NS locked  Dialyzer Rinse: Light  Total Blood Volume Processed: 88.8 L   Total Dialysis (Treatment) Time: 3.5 hours   Dialysate Bath: K 3, Ca 2.25  Heparin: None    Lab:   No    Interventions:  Epo  O2 1LPM via NC   BG = 179, 1 unit aspart given per sliding scale.  Gastric drain emptied per patient request, 600mL output documented.    Assessment:  Uncomplicated HD for 3.5 hrs on K 3/Ca 2.25 bath, 450 BFR achieved. Alert and oriented x4. Calm and cooperative.  Arrived on room air, O2 1LPM applied as sats dropped into 80s while patient slept. Rested with eyes closed for most of treatment.  CVC patent, saline locked and caps changed.      Plan:    Per renal team

## 2022-10-05 NOTE — PROGRESS NOTES
[facility]    Medicine Progress Note - Hospitalist Service, GOLD TEAM 10    Date of Admission:  9/9/2022    Assessment & Plan        62 year old female with pertinent hx of end stage COPD s/p bilateral sequential lung transplant (6/22) on triple IS ( MMF/Tacro/pred), pyloric ulcer, recent ERCP c/f hemobilia, gastroparesis s/p GJ tube placement and Percutaneous J tube presents for a planned admission from transplant pulmonology to work up antibody medicated rejection versus infection.     Today's updates  - Touching base with Dr. Arroyo if ERCP can be done while here  - dispo pending HD chair; not certain if MWF ot TTS chair; need to coordinate with outpat EGD+ERCP  - Abd xray for abd discomfort   Discharge items:  - ERCP and EGD will be planned same day as OP to reduce sedation greatly appr GI team asst w/coordination  - Prednisone taper due 10/13 (not yet ordered)    #Acute kidney injury AKIN stage II on CKD stage IIIb (POA)-->ESRD on HD  :: Patient had been variable GFRs 30-50s in the last few months. Most recent Cr trend 1.5-1.9 1.56 9/8/22 cystatin C obtained and GFR ~10  :: Renal consulted, cont iHD for now; uneventful placement of tunneled catheter by IR on 9/26   :: trend BMP strict input and output and daily body weight  :: Vitamin D level -25;  parathyroid hormone = 46; c/w Calcium and vit D      #Diarrhea likely secondary to tube feeding, not present on admission, improved  :: repeatedly ruled out for C. difficile colitis.   :: c/w fibers at 28 g and Imodium as needed.     #Acute hypoxic hypercarbic respiratory failure secondary to bibasilar pleural effusion  Presented with chest tubes bilaterally on top of end-stage COPD s/p bilateral sequential lung transplant on 6/22/2022 complicated by chronic respiratory acidosis with compensation by metabolic alkalosis, all are present on admission   This is the main problem that led to this admission, had bilateral  chest tubes; patient had her right-sided chest tube removed on 9/22/2022 and removal of Left-sided chest tube 9/29/2022   -DSA testing on 10/5/2022  -Vibha-Barr virus quantitative was ordered for 10/7/2022  -Continue 3 drug immunosuppression with azathioprine, prednisone, and tacrolimus  -Prednisone taper to be performed on 10/13/2022  -Continue oxycodone a prn pain  -Increased gabapentin to 200 mg nightly  -Continue BiPAP as detailed above  -valganciclovir for cytomegalovirus prophylaxis through 9/28/2022   -Continue dapsone for PJP prophylaxis  -Continue folic acid while on dapsone  -Continue nystatin.  Prophylaxis protocol post lung transplant  -[facility] transplant pulmonology service     #Severe gastroparesis s/p  GJ tube placement 7/27/2022  #Significant delayed gastric emptying s/p gastrojejunostomy tube placement, present on admission  We will continue to utilize jejunal tube for nutrition. Ordered a follow-up nuclear medicine gastric emptying study for evaluation of any hopefully improvement in the patient's gastric emptying  -repeat GE study 9/30/22, no major change  -RD nutrition consult placed for TF  -Percutaneous j tube in place with G venting to gravity   -KUB today      #Steroid-induced hyperglycemia and diabetes mellitus  -continue insulin Lantus with insulin NovoLog sliding scale    #HTN  # A flutter with RVR/SVT  - Has recently completed zio patch.  - Continue PTA metoprolol 50 mg BID    # Acute blood loss anemia secondary to pyloric ulcer on top of chronic anemia of chronic disease, all are present on admission  -Continue pantoprazole 40 mg twice daily  -Repeat endoscopy  10/13 to check healing of ulcer, sooner if hgb declines further    #Acute metabolic encephalopathy secondary to urinary tract infection, not clear if present on admission, ordered a total of 5-day course of ceftriaxone for which the patient encephalopathy resolved     # extra hepatic and intrahepatic biliary dilation c/f  hemobilia   # Intra ductal papillary mucinous neoplasm   :: planning for ERCP on 10/7  :: s/p ERCP 8/11 showed hemobilia.   :: Monitor LFTs      #Acute on chronic heart failure with preserved ejection fraction LVEF 65%, the patient was appropriately diuresed, follow-up as outpatient         Diet: Adult Formula Drip Feeding: Continuous Nepro with Carbsteady; Jejunostomy; Goal Rate: 40 ml/hr--okay to begin at goal once new formula arrives on unit.; mL/hr; Medication - Feeding Tube Flush Frequency: At least 15-30 mL water before and after med...  Snacks/Supplements Adult: Nepro Oral Supplement; Between Meals  Full Liquid Diet    DVT Prophylaxis: Heparin SQ  Dong Catheter: Not present  Central Lines: PRESENT  CVC Double Lumen Right Internal jugular Tunneled-Site Assessment: WDL  Cardiac Monitoring: None  Code Status: Full Code      Disposition Plan     Expected Discharge Date: 10/05/2022,  9:00 AM      Discharge Comments: Decision will need to be made as the patient would need dialysis after discharge or not.  The patient received dialysis on 9/27 and 9/28 while awaiting improvement of her kidney function.  Hopefully the patient would not need dialysis upon discharge. TBD -staying at the [facility] for another several months, so [place] would probably work. She will need transportation.      The patient's care was discussed with the Patient and Pulmonary Consultant.    Greg Pritchard MD  Hospitalist Service, GOLD TEAM [address] [place] [address]  Securely message with the Vocera Web Console (learn more here)  Text page via [facility] Paging/Directory   Please see signed in provider for up to date coverage information      Clinically Significant Risk Factors Present on Admission                      ______________________________________________________________________    Interval History   No acute events overnight, has no complaints    Data reviewed today: I reviewed all  medications, new labs and imaging results over the last 24 hours.     Physical Exam   Vital Signs: Temp: 98.5  F (36.9  C) Temp src: Oral BP: 129/68 Pulse: 103   Resp: 19 SpO2: 97 % O2 Device: None (Room air) Oxygen Delivery: 1 LPM  Weight: 154 lbs 3.2 oz    Constitutional:Awake, alert, cooperative, no apparent distress  Respiratory: Clear to auscultation bilaterally  Cardiovascular: Regular rate and rhythm,   GI: Normal bowel sounds, soft, non-distended, non-tender  Skin/Integumen: No rashes, no cyanosis       Data   Recent Labs   Lab 10/05/22  1002 10/05/22  0615 10/05/22  0511 10/04/22  0817 10/04/22  0601 10/03/22  1006 10/03/22  0548 10/02/22  0824 10/02/22  0638   WBC  --   --   --   --   --   --   --   --  7.8   HGB  --   --   --   --   --   --   --   --  8.4*   MCV  --   --   --   --   --   --   --   --  104*   PLT  --   --   --   --   --   --   --   --  285   NA  --  135*  --   --  133*  --  137  --  136   POTASSIUM  --  4.2  --   --  4.2  --  4.4  --  4.2   CHLORIDE  --  92*  --   --  95*  --  96*  --  96*   CO2  --  34*  --   --  33*  --  35*  --  34*   BUN  --  51.7*  --   --  35.3*  --  55.0*  --  41.3*   CR  --  2.48*  --   --  2.05*  --  2.45*  --  2.25*   ANIONGAP  --  9  --   --  5*  --  6*  --  6*   ESTUARDO  --  9.7  --   --  8.9  --  9.5  --  8.9   * 135* 145*   < > 134*   < > 136*   < > 137*   ALBUMIN  --  3.4*  --   --  3.2*  --  3.1*  --  3.0*   PROTTOTAL  --  5.6*  --   --  5.2*  --  5.1*  --  5.0*   BILITOTAL  --  0.2  --   --  0.2  --  0.2  --  0.2   ALKPHOS  --  91  --   --  90  --  80  --  86   ALT  --  7*  --   --  12  --  12  --  12   AST  --  18  --   --  18  --  16  --  18    < > = values in this interval not displayed.     No results found for this or any previous visit (from the past 24 hour(s)).  Medications     dextrose       - MEDICATION INSTRUCTIONS -       - MEDICATION INSTRUCTIONS -         azaTHIOprine  100 mg Per J Tube Daily     B and C vitamin Complex with folic acid  5  mL Oral Daily     calcium carbonate 600 mg-vitamin D 400 units  1 tablet Per J Tube BID w/meals     cyanocobalamin  500 mcg Per Feeding Tube Daily     dapsone  50 mg Per J Tube Q Mon Wed Fri AM     fiber modular (NUTRISOURCE FIBER)  1 packet Per Feeding Tube BID     [Held by provider] folic acid  1 mg Oral or Feeding Tube Daily     gabapentin  200 mg Oral At Bedtime     heparin ANTICOAGULANT  5,000 Units Subcutaneous Q12H     insulin aspart  1-6 Units Subcutaneous Q6H     [Held by provider] insulin glargine  6 Units Subcutaneous QAM AC     metoprolol  12.5 mg Per J Tube BID     nystatin  1,000,000 Units Swish & Swallow 4x Daily     pantoprazole  40 mg Per J Tube BID     predniSONE  10 mg Per J Tube Daily     predniSONE  7.5 mg Per J Tube QPM     sodium chloride (PF)  3 mL Intracatheter Q8H     tacrolimus  4 mg Per J Tube BID IS     **a portion of my previous note is copied and pasted above, it has been edited as needed to reflect events for today**

## 2022-10-05 NOTE — PLAN OF CARE
Goal Outcome Evaluation:    Plan of Care Reviewed With:  (Pt was not in her room or using the restroom during attempts)     Overall Patient Progress: no change    Outcome Evaluation: Infuse TF at goal to help ensure micronutrient needs are met via post-pyloric feeding tube. Diet order as per team recommendation with GI status.

## 2022-10-05 NOTE — PROGRESS NOTES
Care Management Follow Up    Length of Stay (days): 26    Expected Discharge Date: 10/06/22, pending confirmation of OP HD chair     Concerns to be Addressed: all concerns addressed in this encounter     Patient plan of care discussed at interdisciplinary rounds: Yes      Anticipated Discharge Disposition: [place]     Anticipated Discharge Services: Home Infusion, OP HD  Anticipated Discharge DME: None     Referrals Placed by CM/SW: Outpt Dialysis     Additional Information:  This writer called [facility] (Ph: [phone], Fax [phone] - Claudia ext [phone]) and spoke with Miguel Angel who stated that the unit is still reviewing. Miguel Angel stated the case has been escalated since pt remains otherwise medically ready for discharge.     Addendum 10/5 at 0618:  This writer received a call back from Claudia with [facility] that pt has been accepted for a chair time at [facility] ([place]), they are able to start her next Tuesday, 10/11.    This writer updated team. Pt may now have ERCP/EGD inpatient later this week (was previously planned for outpatient).     [facility]  [address] , [address]  [place] [address]  Phone: [phone]  Fax: [phone]    Outpatient dialysis Tues/Thurs/Sat at 6:45am. Pt to arrive on first day at 6am, tentatively 10/11.     [facility] Provide a Ride   Phone: [phone]    Recurring transportation arranged from [facility] to [facility] starting 10/11. Rides have been setup T/Th/Sat through 11/12.   On 10/11, estimated pickup time is 5:30am for a 6am arrival.  Following this estimated pickup time is 6:15am for a 6:45am arrival.   Rides are through [employer] (Ph: [phone]) and return rides are set up as will call.      Pt updated on the above arrangements, will adjust as needed.     CC will continue to monitor patient's medical condition and progress towards discharge.  Sana Castillo, RN BSN  6C Unit  Care Coordinator  Phone number: [phone]  Pager: [phone]

## 2022-10-06 ENCOUNTER — APPOINTMENT (OUTPATIENT)
Dept: PHYSICAL THERAPY | Facility: CLINIC | Age: 62
DRG: 205 | End: 2022-10-06
Attending: INTERNAL MEDICINE
Payer: MEDICARE

## 2022-10-06 ENCOUNTER — APPOINTMENT (OUTPATIENT)
Dept: OCCUPATIONAL THERAPY | Facility: CLINIC | Age: 62
DRG: 205 | End: 2022-10-06
Attending: INTERNAL MEDICINE
Payer: MEDICARE

## 2022-10-06 ENCOUNTER — ANESTHESIA EVENT (OUTPATIENT)
Dept: SURGERY | Facility: CLINIC | Age: 62
DRG: 205 | End: 2022-10-06
Payer: MEDICARE

## 2022-10-06 DIAGNOSIS — Z94.2 S/P LUNG TRANSPLANT (H): Primary | ICD-10-CM

## 2022-10-06 DIAGNOSIS — Z79.899 ENCOUNTER FOR LONG-TERM (CURRENT) USE OF HIGH-RISK MEDICATION: ICD-10-CM

## 2022-10-06 LAB
ALBUMIN SERPL BCG-MCNC: 3.2 G/DL (ref 3.5–5.2)
ALP SERPL-CCNC: 92 U/L (ref 35–104)
ALT SERPL W P-5'-P-CCNC: 11 U/L (ref 10–35)
ANION GAP SERPL CALCULATED.3IONS-SCNC: 5 MMOL/L (ref 7–15)
AST SERPL W P-5'-P-CCNC: 21 U/L (ref 10–35)
BILIRUB SERPL-MCNC: 0.3 MG/DL
BUN SERPL-MCNC: 27.3 MG/DL (ref 8–23)
CALCIUM SERPL-MCNC: 9 MG/DL (ref 8.8–10.2)
CHLORIDE SERPL-SCNC: 93 MMOL/L (ref 98–107)
CREAT SERPL-MCNC: 2.06 MG/DL (ref 0.51–0.95)
DEPRECATED HCO3 PLAS-SCNC: 34 MMOL/L (ref 22–29)
DONOR IDENTIFICATION: NORMAL
DQB2: 5399
DSA COMMENTS: NORMAL
DSA PRESENT: YES
DSA TEST METHOD: NORMAL
EBV DNA COPIES/ML, INSTRUMENT: 801 COPIES/ML
EBV DNA SPEC NAA+PROBE-LOG#: 2.9 {LOG_COPIES}/ML
ERYTHROCYTE [DISTWIDTH] IN BLOOD BY AUTOMATED COUNT: 19.8 % (ref 10–15)
GFR SERPL CREATININE-BSD FRML MDRD: 27 ML/MIN/1.73M2
GLUCOSE BLDC GLUCOMTR-MCNC: 144 MG/DL (ref 70–99)
GLUCOSE BLDC GLUCOMTR-MCNC: 147 MG/DL (ref 70–99)
GLUCOSE BLDC GLUCOMTR-MCNC: 154 MG/DL (ref 70–99)
GLUCOSE BLDC GLUCOMTR-MCNC: 164 MG/DL (ref 70–99)
GLUCOSE SERPL-MCNC: 133 MG/DL (ref 70–99)
HCT VFR BLD AUTO: 29.8 % (ref 35–47)
HGB BLD-MCNC: 9.3 G/DL (ref 11.7–15.7)
LIPASE SERPL-CCNC: 17 U/L (ref 13–60)
MCH RBC QN AUTO: 33.2 PG (ref 26.5–33)
MCHC RBC AUTO-ENTMCNC: 31.2 G/DL (ref 31.5–36.5)
MCV RBC AUTO: 106 FL (ref 78–100)
ORGAN: NORMAL
PLATELET # BLD AUTO: 214 10E3/UL (ref 150–450)
POTASSIUM SERPL-SCNC: 3.9 MMOL/L (ref 3.4–5.3)
PROT SERPL-MCNC: 5.4 G/DL (ref 6.4–8.3)
RBC # BLD AUTO: 2.8 10E6/UL (ref 3.8–5.2)
SA 1 CELL: NORMAL
SA 1 TEST METHOD: NORMAL
SA 2 CELL: NORMAL
SA 2 TEST METHOD: NORMAL
SA1 HI RISK ABY: NORMAL
SA1 MOD RISK ABY: NORMAL
SA2 HI RISK ABY: NORMAL
SA2 MOD RISK ABY: NORMAL
SODIUM SERPL-SCNC: 132 MMOL/L (ref 136–145)
TACROLIMUS BLD-MCNC: 9.8 UG/L (ref 5–15)
TME LAST DOSE: NORMAL H
TME LAST DOSE: NORMAL H
UNACCEPTABLE ANTIGENS: NORMAL
UNOS CPRA: 37
WBC # BLD AUTO: 7.9 10E3/UL (ref 4–11)
ZZZSA 1  COMMENTS: NORMAL
ZZZSA 2 COMMENTS: NORMAL

## 2022-10-06 PROCEDURE — 250N000012 HC RX MED GY IP 250 OP 636 PS 637: Performed by: STUDENT IN AN ORGANIZED HEALTH CARE EDUCATION/TRAINING PROGRAM

## 2022-10-06 PROCEDURE — 250N000009 HC RX 250: Performed by: INTERNAL MEDICINE

## 2022-10-06 PROCEDURE — 250N000013 HC RX MED GY IP 250 OP 250 PS 637: Performed by: INTERNAL MEDICINE

## 2022-10-06 PROCEDURE — 250N000013 HC RX MED GY IP 250 OP 250 PS 637: Performed by: NURSE PRACTITIONER

## 2022-10-06 PROCEDURE — 250N000012 HC RX MED GY IP 250 OP 636 PS 637: Performed by: PHYSICIAN ASSISTANT

## 2022-10-06 PROCEDURE — 99233 SBSQ HOSP IP/OBS HIGH 50: CPT | Performed by: PHYSICIAN ASSISTANT

## 2022-10-06 PROCEDURE — 80053 COMPREHEN METABOLIC PANEL: CPT

## 2022-10-06 PROCEDURE — 214N000001 HC R&B CCU UMMC

## 2022-10-06 PROCEDURE — 250N000013 HC RX MED GY IP 250 OP 250 PS 637: Performed by: STUDENT IN AN ORGANIZED HEALTH CARE EDUCATION/TRAINING PROGRAM

## 2022-10-06 PROCEDURE — 250N000013 HC RX MED GY IP 250 OP 250 PS 637

## 2022-10-06 PROCEDURE — 36415 COLL VENOUS BLD VENIPUNCTURE: CPT | Performed by: PHYSICIAN ASSISTANT

## 2022-10-06 PROCEDURE — 250N000012 HC RX MED GY IP 250 OP 636 PS 637: Performed by: NURSE PRACTITIONER

## 2022-10-06 PROCEDURE — 36415 COLL VENOUS BLD VENIPUNCTURE: CPT

## 2022-10-06 PROCEDURE — 258N000003 HC RX IP 258 OP 636: Performed by: INTERNAL MEDICINE

## 2022-10-06 PROCEDURE — 83690 ASSAY OF LIPASE: CPT | Performed by: INTERNAL MEDICINE

## 2022-10-06 PROCEDURE — 90937 HEMODIALYSIS REPEATED EVAL: CPT

## 2022-10-06 PROCEDURE — 97535 SELF CARE MNGMENT TRAINING: CPT | Mod: GO | Performed by: OCCUPATIONAL THERAPIST

## 2022-10-06 PROCEDURE — 97110 THERAPEUTIC EXERCISES: CPT | Mod: GP | Performed by: PHYSICAL THERAPIST

## 2022-10-06 PROCEDURE — 85027 COMPLETE CBC AUTOMATED: CPT | Performed by: PHYSICIAN ASSISTANT

## 2022-10-06 PROCEDURE — 99233 SBSQ HOSP IP/OBS HIGH 50: CPT | Performed by: INTERNAL MEDICINE

## 2022-10-06 PROCEDURE — 80197 ASSAY OF TACROLIMUS: CPT | Performed by: NURSE PRACTITIONER

## 2022-10-06 PROCEDURE — 250N000011 HC RX IP 250 OP 636: Performed by: STUDENT IN AN ORGANIZED HEALTH CARE EDUCATION/TRAINING PROGRAM

## 2022-10-06 RX ADMIN — NYSTATIN 1000000 UNITS: 100000 SUSPENSION ORAL at 15:00

## 2022-10-06 RX ADMIN — Medication 100 MG: at 07:56

## 2022-10-06 RX ADMIN — NYSTATIN 1000000 UNITS: 100000 SUSPENSION ORAL at 20:08

## 2022-10-06 RX ADMIN — TACROLIMUS 4 MG: 5 CAPSULE ORAL at 07:55

## 2022-10-06 RX ADMIN — HEPARIN SODIUM 5000 UNITS: 5000 INJECTION, SOLUTION INTRAVENOUS; SUBCUTANEOUS at 20:08

## 2022-10-06 RX ADMIN — SODIUM CHLORIDE 250 ML: 9 INJECTION, SOLUTION INTRAVENOUS at 13:03

## 2022-10-06 RX ADMIN — INSULIN ASPART 1 UNITS: 100 INJECTION, SOLUTION INTRAVENOUS; SUBCUTANEOUS at 10:51

## 2022-10-06 RX ADMIN — CALCIUM CARBONATE 600 MG (1,500 MG)-VITAMIN D3 400 UNIT TABLET 1 TABLET: at 17:57

## 2022-10-06 RX ADMIN — GABAPENTIN 200 MG: 250 SOLUTION ORAL at 21:16

## 2022-10-06 RX ADMIN — ORAL VEHICLES - SUSP 12.5 MG: SUSPENSION at 20:08

## 2022-10-06 RX ADMIN — CALCIUM CARBONATE 600 MG (1,500 MG)-VITAMIN D3 400 UNIT TABLET 1 TABLET: at 07:55

## 2022-10-06 RX ADMIN — Medication 1 PACKET: at 07:54

## 2022-10-06 RX ADMIN — HEPARIN SODIUM 5000 UNITS: 5000 INJECTION, SOLUTION INTRAVENOUS; SUBCUTANEOUS at 07:55

## 2022-10-06 RX ADMIN — PREDNISONE 7.5 MG: 5 SOLUTION ORAL at 20:08

## 2022-10-06 RX ADMIN — LOPERAMIDE HCL 2 MG: 1 SOLUTION ORAL at 14:54

## 2022-10-06 RX ADMIN — Medication 40 MG: at 20:08

## 2022-10-06 RX ADMIN — Medication 5 ML: at 07:55

## 2022-10-06 RX ADMIN — PREDNISONE 10 MG: 5 SOLUTION ORAL at 07:55

## 2022-10-06 RX ADMIN — SODIUM CHLORIDE 300 ML: 9 INJECTION, SOLUTION INTRAVENOUS at 13:03

## 2022-10-06 RX ADMIN — INSULIN ASPART 1 UNITS: 100 INJECTION, SOLUTION INTRAVENOUS; SUBCUTANEOUS at 15:00

## 2022-10-06 RX ADMIN — NYSTATIN 1000000 UNITS: 100000 SUSPENSION ORAL at 07:56

## 2022-10-06 RX ADMIN — CYANOCOBALAMIN TAB 500 MCG 500 MCG: 500 TAB at 07:55

## 2022-10-06 RX ADMIN — ORAL VEHICLES - SUSP 12.5 MG: SUSPENSION at 07:56

## 2022-10-06 RX ADMIN — OXYCODONE HYDROCHLORIDE 5 MG: 5 SOLUTION ORAL at 21:16

## 2022-10-06 RX ADMIN — Medication 1 PACKET: at 20:09

## 2022-10-06 RX ADMIN — NYSTATIN 1000000 UNITS: 100000 SUSPENSION ORAL at 12:25

## 2022-10-06 RX ADMIN — INSULIN ASPART 1 UNITS: 100 INJECTION, SOLUTION INTRAVENOUS; SUBCUTANEOUS at 21:21

## 2022-10-06 RX ADMIN — TACROLIMUS 4 MG: 5 CAPSULE ORAL at 17:57

## 2022-10-06 RX ADMIN — Medication 40 MG: at 07:56

## 2022-10-06 ASSESSMENT — ACTIVITIES OF DAILY LIVING (ADL)
ADLS_ACUITY_SCORE: 29

## 2022-10-06 NOTE — PROGRESS NOTES
"SPIRITUAL HEALTH SERVICES  SPIRITUAL ASSESSMENT Progress Note  [facility] ([place] 6C     REFERRAL SOURCE: Length of Stay     Pt reported she is \"doing well,\" and hoping to go home soon.  She detailed her illness narrative including her double lung transplant, and subsequent rehospitalization.      She said that God, her karen and her family are sustaining her as she navigates her health challenges.  Pt asked for prayer, which was provided.        PLAN: [facility] will remain available for support as needed.     Nguyen Carlin    Pager: [phone]    "

## 2022-10-06 NOTE — PROGRESS NOTES
[facility]    Medicine Progress Note - Hospitalist Service, GOLD TEAM 10    Date of Admission:  9/9/2022    Assessment & Plan        62 year old female with pertinent hx of end stage COPD s/p bilateral sequential lung transplant (6/22) on triple IS ( MMF/Tacro/pred), pyloric ulcer, recent ERCP c/f hemobilia, gastroparesis s/p GJ tube placement and Percutaneous J tube presents for a planned admission from transplant pulmonology to work up antibody medicated rejection versus infection.     Today's updates  - Plan for ERCP, EGD tomorrow, NPO after MN, hold TF  - Plan for HD on Saturday and discharge, Outpt HD chair is TuTS  - Plan to cycle TF (may cause more bloating and diarrhea)  - Prednisone taper due 10/13 (not yet ordered)    #Acute kidney injury AKIN stage II on CKD stage IIIb (POA)-->ESRD on HD  :: Patient had been variable GFRs 30-50s in the last few months. Most recent Cr trend 1.5-1.9 1.56 9/8/22 cystatin C obtained and GFR ~10  - Renal consulted, initiated on iHD  - Outpatient iHD is On TTS     #Diarrhea likely secondary to tube feeding, not present on admission, improved  :: repeatedly ruled out for C. difficile colitis.   :: c/w fibers at 28 g and Imodium as needed.     #Acute hypoxic hypercarbic respiratory failure secondary to bibasilar pleural effusion  Presented with chest tubes bilaterally on top of end-stage COPD s/p bilateral sequential lung transplant on 6/22/2022 complicated by chronic respiratory acidosis with compensation by metabolic alkalosis, all are present on admission   This is the main problem that led to this admission, had bilateral chest tubes; patient had her right-sided chest tube removed on 9/22/2022 and removal of Left-sided chest tube 9/29/2022   -DSA testing on 10/5/2022  -Vibha-Barr virus quantitative was ordered for 10/7/2022  -Continue 3 drug immunosuppression with azathioprine, prednisone, and tacrolimus  -Prednisone taper to be  performed on 10/13/2022  -Continue oxycodone a prn pain  -Increased gabapentin to 200 mg nightly  -Continue BiPAP as detailed above  -valganciclovir for cytomegalovirus prophylaxis through 9/28/2022   -Continue dapsone for PJP prophylaxis  -Continue folic acid while on dapsone  -Continue nystatin.  Prophylaxis protocol post lung transplant  -[facility] transplant pulmonology service     #Severe gastroparesis s/p  GJ tube placement 7/27/2022  #Significant delayed gastric emptying s/p gastrojejunostomy tube placement, present on admission  We will continue to utilize jejunal tube for nutrition.   -repeat GE study 9/30/22, no major change  -RD nutrition consult placed for TF, will cycle TF  -Percutaneous j tube in place with G venting to gravity   -KUB on 10/5 ok      #Steroid-induced hyperglycemia and diabetes mellitus  -continue insulin Lantus with insulin NovoLog sliding scale    #HTN  # A flutter with RVR/SVT  - Has recently completed zio patch.  - Continue PTA metoprolol 50 mg BID    # Acute blood loss anemia secondary to pyloric ulcer on top of chronic anemia of chronic disease, all are present on admission  -Continue pantoprazole 40 mg twice daily  -Repeat endoscopy  10/13 to check healing of ulcer, sooner if hgb declines further    #Acute metabolic encephalopathy secondary to urinary tract infection, not clear if present on admission, ordered a total of 5-day course of ceftriaxone for which the patient encephalopathy resolved     # extra hepatic and intrahepatic biliary dilation c/f hemobilia   # Intra ductal papillary mucinous neoplasm   :: planning for ERCP on 10/7  :: s/p ERCP 8/11 showed hemobilia.   :: Monitor LFTs      #Acute on chronic heart failure with preserved ejection fraction LVEF 65%, the patient was appropriately diuresed, follow-up as outpatient         Diet: Snacks/Supplements Adult: Nepro Oral Supplement; Between Meals  Full Liquid Diet  NPO for Medical/Clinical Reasons Except for: Meds, Ice  Chips, Other; Specify: Please hold TF  Adult Formula Drip Feeding: Specified Time Nepro with Carbsteady; Jejunostomy; Goal Rate: 55 mL/hr x 18 hrs (4p-10a vs adjust timing due to dialysis); mL/hr; From: 4:00 PM; 10:00 AM; Medication - Feeding Tube Flush Frequency: At least 15-30 mL unruly...    DVT Prophylaxis: Heparin SQ  Dong Catheter: Not present  Central Lines: PRESENT  CVC Double Lumen Right Internal jugular Tunneled-Site Assessment: WDL  Cardiac Monitoring: None  Code Status: Full Code      Disposition Plan      Expected Discharge Date: 10/08/2022,  9:00 AM      Discharge Comments: Decision will need to be made as the patient would need dialysis after discharge or not.  The patient received dialysis on 9/27 and 9/28 while awaiting improvement of her kidney function.  Hopefully the patient would not need dialysis upon discharge. TBD -staying at the [facility] for another several months, so [employer] would probably work. She will need transportation.      The patient's care was discussed with the Patient and Pulmonary Consultant.    Greg Pritchard MD  Hospitalist Service, [address]  Securely message with the Vocera Web Console (learn more here)  Text page via [facility] Paging/Directory   Please see signed in provider for up to date coverage information      Clinically Significant Risk Factors Present on Admission                      ______________________________________________________________________    Interval History   No acute events overnight, has no complaints, abdominal discomfort better today     Data reviewed today: I reviewed all medications, new labs and imaging results over the last 24 hours.     Physical Exam   Vital Signs: Temp: 99.2  F (37.3  C) Temp src: Oral BP: 123/71 Pulse: 103   Resp: 16 SpO2: 98 % O2 Device: Nasal cannula Oxygen Delivery: 1 LPM  Weight: 148 lbs 1.6 oz    Constitutional:Awake, alert, cooperative, no  apparent distress  Respiratory: Clear to auscultation bilaterally  Cardiovascular: Regular rate and rhythm,   GI: Normal bowel sounds, soft, non-distended, non-tender  Skin/Integumen: No rashes, no cyanosis       Data   Recent Labs   Lab 10/06/22  1051 10/06/22  0811 10/06/22  0544 10/05/22  1002 10/05/22  0615 10/04/22  0817 10/04/22  0601 10/02/22  0824 10/02/22  0638   WBC  --  7.9  --   --   --   --   --   --  7.8   HGB  --  9.3*  --   --   --   --   --   --  8.4*   MCV  --  106*  --   --   --   --   --   --  104*   PLT  --  214  --   --   --   --   --   --  285   NA  --   --  132*  --  135*  --  133*   < > 136   POTASSIUM  --   --  3.9  --  4.2  --  4.2   < > 4.2   CHLORIDE  --   --  93*  --  92*  --  95*   < > 96*   CO2  --   --  34*  --  34*  --  33*   < > 34*   BUN  --   --  27.3*  --  51.7*  --  35.3*   < > 41.3*   CR  --   --  2.06*  --  2.48*  --  2.05*   < > 2.25*   ANIONGAP  --   --  5*  --  9  --  5*   < > 6*   ESTUARDO  --   --  9.0  --  9.7  --  8.9   < > 8.9   *  --  144*  133*   < > 135*   < > 134*   < > 137*   ALBUMIN  --   --  3.2*  --  3.4*  --  3.2*   < > 3.0*   PROTTOTAL  --   --  5.4*  --  5.6*  --  5.2*   < > 5.0*   BILITOTAL  --   --  0.3  --  0.2  --  0.2   < > 0.2   ALKPHOS  --   --  92  --  91  --  90   < > 86   ALT  --   --  11  --  7*  --  12   < > 12   AST  --   --  21  --  18  --  18   < > 18    < > = values in this interval not displayed.     Recent Results (from the past 24 hour(s))   XR Abdomen 1 View    Narrative    Exam: XR ABDOMEN 1 VIEW, 10/5/2022 2:17 PM    Indication: bloating, increased gastric output    Comparison: 11/20/2022 CT abdomen and pelvis    Findings:   Supine view of the abdomen. Bowel gas pattern is nonobstructive. Small  volume of pneumobilia. Common bile duct stent and pancreatic duct  stent projecting over the right mid abdomen. Percutaneous GJ-tube is  postpyloric with the tip likely in the proximal jejunum in the left  abdomen. There is some mild gaseous  distention of bowel loops in the  right upper quadrant demonstrated however overall pattern does not  appear obstructed with gas present to the rectosigmoid colon.      Impression    Impression: No acute findings. Support devices as described in the  report.    I have personally reviewed the examination and initial interpretation  and I agree with the findings.    YANNICK BENAVIDEZ MD         SYSTEM ID:  [id number]   XR Chest 2 Views    Narrative    EXAMINATION:  XR CHEST 2 VIEWS 10/5/2022 2:18 PM.    COMPARISON: 9/3/2022 chest radiograph.    HISTORY:  interval follow up, lung transplant history    FINDINGS: Right internal jugular central venous catheter with tip  overlying the low SVC near cavoatrial junction. Stable clamshell  sternotomy wires. Gastrostomy tube with internal retention bulb  overlying left abdomen.    Cardiomediastinal silhouette and pulmonary vasculature are within  normal limits. Aortic arch calcifications. Unchanged perihilar  opacities. Persistent loculated fluid in the right major fissure.  Fluid collecting within minor fissure. Fluid collection along the free  wall of the left chest. Unchanged small left pleural effusion. Left  apical pleural thickening. No right-sided pleural effusion.  Degenerative changes of the visualized spine.      Impression    IMPRESSION:   1. Continued loculation of fluid in the right major fissure with fluid  collecting within the minor fissure.  2. Unchanged left-sided pleural effusion tracking along the left free  wall with left apical thickening.  3. Bilateral diffuse interstitial/airspace opacities, likely  atelectasis and/or consolidation.    I have personally reviewed the examination and initial interpretation  and I agree with the findings.    JOHANN MORAES MD         SYSTEM ID:  [id number]     Medications     dextrose       - MEDICATION INSTRUCTIONS -       - MEDICATION INSTRUCTIONS -         azaTHIOprine  100 mg Per J Tube Daily     B and C vitamin Complex  with folic acid  5 mL Oral Daily     calcium carbonate 600 mg-vitamin D 400 units  1 tablet Per J Tube BID w/meals     cyanocobalamin  500 mcg Per Feeding Tube Daily     dapsone  50 mg Per J Tube Q Mon Wed Fri AM     fiber modular (NUTRISOURCE FIBER)  1 packet Per Feeding Tube BID     [Held by provider] folic acid  1 mg Oral or Feeding Tube Daily     gabapentin  200 mg Oral At Bedtime     heparin ANTICOAGULANT  5,000 Units Subcutaneous Q12H     insulin aspart  1-6 Units Subcutaneous Q6H     [Held by provider] insulin glargine  6 Units Subcutaneous QAM AC     metoprolol  12.5 mg Per J Tube BID     - MEDICATION INSTRUCTIONS -   Does not apply Once     nystatin  1,000,000 Units Swish & Swallow 4x Daily     pantoprazole  40 mg Per J Tube BID     predniSONE  10 mg Per J Tube Daily     predniSONE  7.5 mg Per J Tube QPM     sodium chloride (PF)  3 mL Intracatheter Q8H     sodium chloride (PF)  9 mL Intracatheter During Dialysis/CRRT (from stock)     sodium chloride (PF)  9 mL Intracatheter During Dialysis/CRRT (from stock)     tacrolimus  4 mg Per J Tube BID IS     **a portion of my previous note is copied and pasted above, it has been edited as needed to reflect events for today**

## 2022-10-06 NOTE — PROGRESS NOTES
Care Coordinator  D/I: Dr Greg Pritchard informed of dialysis chair TTS and cannot start her until Tuesday 10/11/22.  P: Dr Pritchard will keep her and run her dialysis Saturday and then she can discharge to [facility] after inpatient HD.

## 2022-10-06 NOTE — PROGRESS NOTES
Nephrology Progress Note  10/06/2022     Ms Rodriguez is a 61 yo female with PMH end stage COPD s/p bilateral sequential lung transplant (6/22) on triple IS ( MMF/Tacro/pred), pyloric ulcer, recent ERCP c/f hemobilia, gastroparesis s/p GJ tube placement and Percutaneous J tube , BACILIO requiring short term HD, admitted 9/9/22 for planned admission from transplant pulmonology to work up antibody medicated rejection versus infection. She was found to have ESRD with Cystatin C GFR of 10 ml/mn.      Assessment & Recommendations:     ESRD based upon Cystatin C ( GFR of 10) - Creat has remained stable in the mid to upper 1 range, but BUN was elevated out of proportion to her creat.    - It was felt that patient's symptoms of fatigue, poor appetite, recent confusion and elevated BUN (82 on admission, peaked at 112) indicated poor renal function. Given low muscle mass Cystatin C was obtained and found to be 10 ml/mn   - Tunneled HD line placed 9/26/22   - Patient initiated HD 9/26/22.   - OP HD schedule will be TTS at [facility]  - Dialyzed for short run today, shortened by pt having significant abd pain (scheduled for ERCP tomorrow)  - Next run will be Saturday per TTS schedule        Volume status - Lower extremity edema is improving. Diarrhea persists and felt to be 2/2 TF. She is making less urine. Admission weight 73.4 kg (baseline weight ~ 60-62 kg, per pt). Has output from abd drain as well   - appears to be oliguric at this point   - Please get standing pre run weights   - Continue strict I/O    CV - Pre run b/ps 120-140/. 1+ LE edema. On Metoprolol 12.5 mg bid    BMD - Ca 9's, albumin 3's   - PTH 46, Vit D 25 ( 9/22)   - Continue Ca/D    Anemia - Hgb 9.3 g/dL   - Pyloric ulcer per EGD 8/3/22   - Ferritin 769, Fe 54 IS 22 ( 9/22)   - On B12, folic acid, PPI   - Began Epo 5000 U IV q run 9/27      Lung transplant IS: AZa, Pred, Tac. Tac level 11.5   - Per transplant        Recommendations were communicated to  "primary team via note    RABIA Moctezuma  Division of Renal Disease and Hypertension  [phone]    Interval History :   Nursing and provider notes from last 24 hours reviewed.  Short run today to get on TTS schedule. Seen on dialysis, stopped run after 45 min; pt had gone to bathroom and was having severe abd pain, is having MRCP tomorrow. Next run Saturday. No CP, SOB, chills    Review of Systems:   4 point ROS neg other than as noted above    Physical Exam:   I/O last 3 completed shifts:  In: 1220 [NG/GT:260]  Out: 3300 [Urine:25; Emesis/NG output:1075; Other:2000; Stool:200]   /75   Pulse 105   Temp 98.4  F (36.9  C) (Oral)   Resp 16   Ht 1.588 m (5' 2.5\")   Wt 67.2 kg (148 lb 1.6 oz)   SpO2 99%   BMI 26.66 kg/m       GENERAL APPEARANCE: abd pain  EYES: no scleral icterus, pupils equal  PULM: CTA  CV: RRR      -edema- legs wrapped   GI: cramping, abd drain   INTEGUMENT: no rash  NEURO:  Alert/interactive  Access - Right TDC    Labs:   All labs reviewed by me  Electrolytes/Renal -   Recent Labs   Lab Test 10/06/22  1051 10/06/22  0544 10/05/22  1002 10/05/22  0615 10/04/22  0817 10/04/22  0601 10/02/22  0824 10/02/22  0638 09/28/22  0809 09/28/22  0514 09/27/22  0950 09/27/22  0511 09/26/22  0913 09/26/22  0555   NA  --  132*  --  135*  --  133*   < > 136   < > 133*  --  135*  --  137   POTASSIUM  --  3.9  --  4.2  --  4.2   < > 4.2   < > 4.2  --  4.1  --  4.9   CHLORIDE  --  93*  --  92*  --  95*   < > 96*   < > 95*  --  91*  --  87*   CO2  --  34*  --  34*  --  33*   < > 34*   < > 32*  --  39*  --  44*   BUN  --  27.3*  --  51.7*  --  35.3*   < > 41.3*   < > 39.2*  --  63.4*  --  96.7*   CR  --  2.06*  --  2.48*  --  2.05*   < > 2.25*   < > 1.44*  --  1.48*  --  1.72*   * 144*  133*   < > 135*   < > 134*   < > 137*   < > 143*   < > 173*   < > 148*   ESTUARDO  --  9.0  --  9.7  --  8.9   < > 8.9   < > 8.9  --  9.0  --  9.4   MAG  --   --   --   --   --   --   --  2.0  --  2.0  --  2.3  -- "  2.7*   PHOS  --   --   --   --   --   --   --   --   --  3.2  --  3.5  --  3.7    < > = values in this interval not displayed.       CBC -   Recent Labs   Lab Test 10/06/22  0811 10/02/22  0638 09/28/22  0514   WBC 7.9 7.8 10.5   HGB 9.3* 8.4* 8.4*    285 231       LFTs -   Recent Labs   Lab Test 10/06/22  0544 10/05/22  0615 10/04/22  0601   ALKPHOS 92 91 90   BILITOTAL 0.3 0.2 0.2   ALT 11 7* 12   AST 21 18 18   PROTTOTAL 5.4* 5.6* 5.2*   ALBUMIN 3.2* 3.4* 3.2*       Iron Panel -   Recent Labs   Lab Test 09/26/22  0555 09/03/22  1039 08/24/22  0810   IRON 54 21* 41   IRONSAT 22 9* 21   CARLOS 769* 343* 334*         Imaging:    Current Medications:    azaTHIOprine  100 mg Per J Tube Daily     B and C vitamin Complex with folic acid  5 mL Oral Daily     calcium carbonate 600 mg-vitamin D 400 units  1 tablet Per J Tube BID w/meals     cyanocobalamin  500 mcg Per Feeding Tube Daily     dapsone  50 mg Per J Tube Q Mon Wed Fri AM     fiber modular (NUTRISOURCE FIBER)  1 packet Per Feeding Tube BID     [Held by provider] folic acid  1 mg Oral or Feeding Tube Daily     gabapentin  200 mg Oral At Bedtime     heparin ANTICOAGULANT  5,000 Units Subcutaneous Q12H     insulin aspart  1-6 Units Subcutaneous Q6H     [Held by provider] insulin glargine  6 Units Subcutaneous QAM AC     metoprolol  12.5 mg Per J Tube BID     - MEDICATION INSTRUCTIONS -   Does not apply Once     nystatin  1,000,000 Units Swish & Swallow 4x Daily     pantoprazole  40 mg Per J Tube BID     predniSONE  10 mg Per J Tube Daily     predniSONE  7.5 mg Per J Tube QPM     sodium chloride (PF)  3 mL Intracatheter Q8H     sodium chloride (PF)  9 mL Intracatheter During Dialysis/CRRT (from stock)     sodium chloride (PF)  9 mL Intracatheter During Dialysis/CRRT (from stock)     tacrolimus  4 mg Per J Tube BID IS       dextrose       - MEDICATION INSTRUCTIONS -       - MEDICATION INSTRUCTIONS -       RABIA Moctezuma

## 2022-10-06 NOTE — PROGRESS NOTES
"CLINICAL NUTRITION SERVICES     Nutrition Prescription    RECOMMENDATIONS FOR MDs/PROVIDERS TO ORDER:  If GI sx are not improving or if desire to try another renal formula, rec Eneida Stoll Renal Support (lower in K+) @ goal rate of 40 mL/hr + 1 pkt Prosource TF modular which provides 960 mL TF, 1768 kcals (32 kcals/kg), 88 g protein (1.6 g protein/kg), 643 mL H2O, 165 g CHO, and 15 g fiber daily. TFs may be adjusted/cycled as needed. Discontinue Nutrisource Fiber initially but order 1 pkt TID if stools are loose 48-72 hours later.     Malnutrition Status:    See prior RD notes.    Recommendations already ordered by Registered Dietitian (RD):  Cycled TF (request from provider)    Future/Additional Recommendations:  See prior RD notes.     Checking TF    Diet: Full liquid diet (for comfort). Has orders for Nepro at 10:00 and additional prn.       Nutrition Support: Nepro @ goal 40 ml/hr via J-tube (960 ml/day) to provide 1728 kcals (31 kcal/kg/day), 78 g PRO (1.4 g/kg/day), 701 ml free H2O, 155 g CHO and 24+ g Fiber daily. One pkt Nutrisource Fiber BID. Per RN 10/6, \"Pt reported some abdominal discomfort and feeling full, some relief with G tube vented to gravity.\"    INTERVENTIONS:  Implementation:  Collaboration with other providers, Enteral Nutrition: Per discussion with provider (10/6), pt's GI sx improved today (less abdominal pain). Provider aware of alternate TF recs in nutrition note, if needed. Received call from provider later this morning (10/6). Provider would like TF cycled (time off TFs, such as for dialysis treatments) and is aware this may increase stooling and pt is consuming oral intake for comfort. Modified TF to Nepro at 55 mL/hr x 18 hrs (via J-tube) which provides 990 mL TF, 1782 kcals (32 kcals/kg), 80 g protein (1.4 g protein/kg), 720 mL H2O, 158 g CHO, and 25+ g fiber daily. Will continue Nutrisource Fiber, one packet BID, as ordered. Notified RN of cycled TFs.     Follow up/Monitoring:  Will " continue to follow pt.    Kathie Bateman, MS, RD, LD, CNSC   6C Pgr: [phone]

## 2022-10-06 NOTE — PLAN OF CARE
Admitted 9/9 for increased ANAYA and daily hemoptysis to work up antibody medicated rejection versus infection. PMH of BSLT (6/22), pleural effusions, & severe gastroparesis s/p GJ tube placement 7/27.    Code status: FULL     Team: Med Gold 10  Changed: NPO @ midnight. TF changed from continuous @40 to cycle- 4pm-10am @55 mL/hr. Vent G tube to gravity overnight.    Running: TF @ 55mL/hr.     Neuro: A&O x4  Cardiac/Tele:  No tele orders  Respiratory: RA, 1L NC while asleep  GI/: Oliguric, urinating via toilet, LBM 10/6.  Diet/Appetite: Full liq  Endocrine: q6 BG checks, dialysis run ended early today due to discomfort, ran for 45min with 0.5kg removed.  Skin: Bruised abd and scattered bruising on arms.  LDAs: L PIV- SL.   Activity: SBA/ind  Pain: denies     Plan: Continue to monitor and follow POC. EGD and ERCP tomorrow AM, NPO @ midnight. Anticipated discharge date 10/8 after dialysis.     Fiorella Zurita, RN  Time cared for [phone]

## 2022-10-06 NOTE — PROGRESS NOTES
Pulmonary Medicine  Cystic Fibrosis - Lung Transplant Team  Progress Note  10/06/2022       Patient: Sofie Rodriguez  MRN: [id number]  : 1960 (age 62 year old)  Transplant: 2022 (Lung), POD#100  Admission date: 2022    Assessment & Plan:     Sofie Rodriguez is a 62-year-old female s/p BSLT (22) for COPD complicated by persistent bilateral pleural effusions, persistent CO2 retention, right hemidiaphragm palsy, BACILIO (dialysis -), C. diff colitis, gastroparesis s/p GJ tube placement (22), GI bleed 2/2 pyloric ulcer, hemobilia s/p ERCP and MRCP (), and persistent vasoplegia.  Other history notable for HFpEF, NTM colonoization, hepatitis C, and methamphetamine use.  Patient admitted 22 with persistent dyspnea (increased with activity), daily morning hemoptysis, and increased BLE edema.  Also noted to have persistent, increased bilateral pleural effusions, diffuse bilateral groundglass changes, and more focal appearing LLL infiltrates on imaging.  Treating with aggressive diuresis with some improvement in symptoms and hypoxia.  Hemoptysis resolved, mild intermittent hypoxia and ANAYA persisted.  S/p bilateral chest tubes.  Episode of AMS now resolved.  S/p tunneled line placed and iHD initiation ().  Anticipate discharge over the weekend with OP dialysis next Tuesday.      Today's recommendations:  - Exercise oximetry study ordered, complete prior to discharge  - Pulmonary follow up scheduled 10/12  - Tacrolimus level therapeutic, no dose adjustment, repeat level as OP  - Prednisone taper due 10/13 (not yet ordered)  - CMV monitoring every other week, due 10/19 (not yet ordered)  - DSA and EBV (10/5) pending  - IgG level due 10/29 (not yet ordered)   - G tube to gravity drainage overnight and prn during the day with GI symptoms (N/V, bloating, reflux)  - EGD to check healing of ulcer and ERCP currently scheduled tomorrow     S/p bilateral sequential lung transplant (BSLT) for  end stage COPD:  Acute hypoxia with chronic hypercapnia:   Pulmonary edema:  Persistent bibasilar pleural effusions:   Right hemidiaphragm palsy: Admitted with increased dyspnea with minimal exertion and small volume daily hemoptysis.  Also with marked decline in PFTs (FEV1 1.22L, 50% on 8/18 to 0.98L, 40% on 9/7).  Chest CT (9/8) with increased effusions and groundglass changes.  DSA positive but stable (as below).  BNP markedly elevated (30k) and also with increased BLE edema.  Etiology unclear and is likely multi-factorial with DDx including pulmonary edema from hypervolemia/progressive HFpEF, rejection (ACR +/- AMR), alveolar hemorrhage, and/or infection.  Aggressively diuresed with some improvement in symptoms.  Bronch (9/13) unremarkable, BAL of lingula sent, cultures no growth (GPC on gram stain only).  S/p right pigtail chest tube placement (9/13-9/22), transudative with moderate output initially but quickly decreasing, cultures negative.  S/p aspiration of left pleural effusion (9/13) and then pigtail chest tube (9/15-9/29) s/p full lytic course (916-9/19), cultures also negative, clamped 9/26.  Chest CT (9/27) with grossly unchanged small bilateral hydroPTX with left chest tube coiled in inferior medial left pleural space, continued resolution of nodular opacity in posterior MARLON, and the lobes of the left upper and lower are rotated with respect to each other (discussed at transplant conference 9/29, no intervention).  Weaned to RA at rest on 9/24, using 1L NC overnight.  VBG with improved hypercapnia as of 9/29.  Overnight oximetry (10/3) incomplete given need for 1L NC with desaturation.  CXR (10/5) with with continued loculation of fluid in right major fissure with fluid collecting in minor fissure, left sided pleural effusion tracking along wall with apical thickening, and bilateral diffuse interstitial/airspace opacities.    - Supplemental O2 to keep >92%, 1L NC overnight, exercise oximetry  study (ordered), complete prior to discharge  - Continue to defer NIPPV, repeat VBG prn with change in mentation/clinical status  - IS and Aerobika q1h w/a and encourage OOB during the day as much as able  - Pulmonary follow up scheduled 10/12     Immunosuppression:  - Tacrolimus 4 mg BID (decreased 10/3).  Goal level 8-12.  Level 10/6 therapeutic at 9.8, no dose adjustment, repeat level as OP.  - Azathioprine 100 mg daily (9/20, MMF stopped due to ongoing diarrhea)  - Prednisone 10 mg qAM / 7.5 mg qPM with next taper due 10/13 (not yet ordered)  Date AM dose (mg) PM dose (mg)   9/15/22 10 7.5   10/13/22 7.5 7.5   11/10/22 7.5 5   12/8/22 5 5   1/5/23 5 2.5      Prophylaxis:   - Dapsone qMWF for PJP ppx (resumed 9/19, monitor for worsening anemia; Bactrim stopped d/t hyperkalemia, will need to monitor for recurrence of anemia with dapsone; Pentamidine neb not tolerated on 9/7 after only 5 minutes d/t excessive coughing)  - Completed VGCV for CMV ppx 9/26 (through POD#90), D+/R+, CMV monitoring every other week (due 10/19, not yet ordered)     Positive DSA: Newly positive on 8/10 with DQB2 mfi 2155.   - Monitoring [id number], (10/5) pending, see below for trend:  Date DQB2 Notes   8/10 2155     9/1 7033 Current peak   9/21 5390 Most recent       EBV viremia: Low level (1374 on 9/7), not likely to be clinically significant.  - Follow EBV monthly, (10/5) pending     Hypogammaglobulinemia: IgG adequate at time of transplant, repeat level low (336) on 7/28.  S/p IVIG 7/30, tolerated well.  IgG on 9/29 low at 559 but not indicating IVIG replacement.   - Follow IgG monthly (next due 10/29, not yet ordered)     Other relevant problems being managed by the primary team:     Encephalopathy, Resolved:  Tremors:   Presumed UTI: Noted 9/19 with confusion as well as tremor.  DDx including hypercapnia, hyperammonia, infection, uremia (see below), medication related.  VBG with worsening hypercapnia (99).  BUN elevated (112).  Ammonia  normal (24).  CRP normal, procal 0.1.  UA with moderate blood, large leukocyte esterase, but UC (9/19) negative.  Blood culture (9/19) negative.  Tacro not supratherapeutic.  Head CT (9/20) without acute intracranial pathology.  AMS resolved ~9/22.  S/p ceftriaxone 5-day course (9/20-9/24).     ESRD based upon Cystatin C (GFR of 10): Nephrology consulted 9/21, see their note for details, with concern for CKD5 as Cr may suggest overestimation of kidney function with limited muscle mass, unclear if trend over the past week reflects BACILIO.  Cystatin C 4.3 with GFR 10.  Making urine.  Renal US (9/22) normal.  S/p tunneled line placed and HD initiated 9/26.  Management per nephrology.     Diarrhea: C diff negative on 9/10 and 9/20.  Likely medication related (MMF), but unable to transition to Myfortic given NPO.  Loperamide utilized with some benefit.   Enteric stool panel negative 9/16.  Transitioned from MMF to AZA as above.   - Goal to titrate to antidiarrheal 3 stools daily (without urgency), management per primary     Severe gastroparesis:   Pyloric ulcer: Gastric emptying study 7/20 with severe gastric emptying delay (95% retention at 4 hours), s/p GJ tube placement in IR 7/27.  S/p EGD (8/3) noted to have pyloric ulcer and excessive gastric fluid and residual food.  S/p EGD (8/11) with mild erythema 2/2 GJ tube trauma seen near insertion site but no active bleeding.  Repeat gastric emptying study 9/30 unfortunately with persistent severe retention (91% at 4h) and some fullness and nausea post study; defer adjustments to current plan and consider semi-permanent status as is.  Increased bloating 10/5, AXR with nonobstructive bowel gas pattern, mild gaseous distention of bowel loops in the RUQ.  Symptoms improving 10/6.  - PPI BID, limit opioids  - TF continuous and ALL enteral medications via J tube  - G tube to gravity drainage overnight and prn during the day with GI symptoms (N/V, bloating, reflux); full liquid  "diet for comfort only  - EGD to check healing of ulcer and ERCP currently scheduled 10/7 (primary team to discuss timing with GI pending dispo and dialysis plan)    We appreciate the excellent care provided by the [place] [id number] team.  Recommendations communicated via in person rounding and this note.  Will continue to follow along closely, please do not hesitate to call with any questions or concerns.    Patient discussed with Dr. Paredes.    Yuliet Aviles PA-C  Inpatient [facility]  Pulmonary CF/Transplant     Subjective & Interval History:     Remains on RA during the day even with activity.  1L NC overnight.  Denies significant dyspnea.  Cough nonproductive.  Ongoing although improved abdominal fullness.  G tube with 975 ml output yesterday and ~300 ml so far today.  Minimal PO intake.  One large BM yesterday and 2 small ones, still remains \"mushy\" and used Imodium once.  2L removed with HD yesterday.    Review of Systems:     C: + occasional low grade temp, no chills, + decreased weight  INTEGUMENTARY/SKIN: No rash or obvious new lesions  ENT/MOUTH: No sore throat, no sinus pain, no nasal congestion or drainage  RESP: See interval history  CV: No chest pain, no palpitations, + improving peripheral edema  GI: No nausea, no vomiting, no reflux symptoms  : No dysuria  MUSCULOSKELETAL: + ongoing back pain  ENDOCRINE: Blood sugars with adequate control  NEURO: No headache, no numbness or tingling  PSYCHIATRIC: Mood stable    Physical Exam:     All notes, images, and labs from past 24 hours (at minimum) were reviewed.    Vital signs:  Temp: 98.4  F (36.9  C) Temp src: Oral BP: 127/75 Pulse: 105   Resp: 16 SpO2: 99 % O2 Device: Nasal cannula Oxygen Delivery: 1 LPM Height: 158.8 cm (5' 2.5\") Weight: 67.2 kg (148 lb 1.6 oz)  I/O:     Intake/Output Summary (Last 24 hours) at 10/6/2022 1424  Last data filed at 10/6/2022 1410  Gross per 24 hour   Intake 1140 ml   Output 997 ml   Net 143 ml     Constitutional: Sitting up in " chair, in no apparent distress.   HEENT: Eyes with pink conjunctivae, anicteric.  Oral mucosa moist without lesions.  PULM: Diminished air flow to left > right base.  No crackles, no rhonchi, no wheezes.  Non-labored breathing on RA.  CV: Normal S1 and S2.  RRR.  + systolic murmur.  No gallop or rub.  1-2+ BLE edema.   ABD: NABS, soft, nontender, mildly distended.  G tube with tan/clear output.    MSK: Moves all extremities.    NEURO: Alert, conversant.   SKIN: Warm, dry.  No rash on limited exam.   PSYCH: Mood stable.     Lines, Drains, and Devices:  Peripheral IV 09/10/22 Anterior;Left Lower forearm (Active)   Site Assessment [facility] 10/06/22 0753   Line Status Saline locked 10/06/22 0753   Dressing Intervention New dressing  09/10/22 0135   Phlebitis Scale 0-->no symptoms 10/06/22 0753   Infiltration Scale 0 10/06/22 0753   Number of days: 26       CVC Double Lumen Right Internal jugular Tunneled (Active)   Site Assessment [facility] 10/06/22 0753   External Cath Length (cm) 1 cm 10/03/22 1115   Dressing Type Chlorhexidine disk 10/03/22 1115   Dressing Status clean;dry;intact 10/06/22 0753   Dressing Intervention new dressing 10/03/22 1115   Dressing Change Due 10/10/22 10/05/22 0812   Line Necessity yes, meets criteria 10/04/22 2100   Blue - Status cap changed;saline locked 10/05/22 1140   Blue - Cap Change Due 10/12/22 10/05/22 1140   Red - Status cap changed;saline locked 10/05/22 1140   Red - Cap Change Due 10/12/22 10/05/22 1140   CVC Comment HD line 10/04/22 2100   Number of days: 10     Data:     LABS    CMP:   Recent Labs   Lab 10/06/22  1051 10/06/22  0544 10/05/22  2201 10/05/22  1002 10/05/22  0615 10/04/22  0817 10/04/22  0601 10/03/22  1006 10/03/22  0548 10/02/22  0824 10/02/22  0638   NA  --  132*  --   --  135*  --  133*  --  137  --  136   POTASSIUM  --  3.9  --   --  4.2  --  4.2  --  4.4  --  4.2   CHLORIDE  --  93*  --   --  92*  --  95*  --  96*  --  96*   CO2  --  34*  --   --  34*  --  33*  --  35*   --  34*   ANIONGAP  --  5*  --   --  9  --  5*  --  6*  --  6*   * 144*  133* 175*   < > 135*   < > 134*   < > 136*   < > 137*   BUN  --  27.3*  --   --  51.7*  --  35.3*  --  55.0*  --  41.3*   CR  --  2.06*  --   --  2.48*  --  2.05*  --  2.45*  --  2.25*   GFRESTIMATED  --  27*  --   --  22*  --  27*  --  22*  --  24*   ESTUARDO  --  9.0  --   --  9.7  --  8.9  --  9.5  --  8.9   MAG  --   --   --   --   --   --   --   --   --   --  2.0   PROTTOTAL  --  5.4*  --   --  5.6*  --  5.2*  --  5.1*  --  5.0*   ALBUMIN  --  3.2*  --   --  3.4*  --  3.2*  --  3.1*  --  3.0*   BILITOTAL  --  0.3  --   --  0.2  --  0.2  --  0.2  --  0.2   ALKPHOS  --  92  --   --  91  --  90  --  80  --  86   AST  --  21  --   --  18  --  18  --  16  --  18   ALT  --  11  --   --  7*  --  12  --  12  --  12    < > = values in this interval not displayed.     CBC:   Recent Labs   Lab 10/06/22  0811 10/02/22  0638   WBC 7.9 7.8   RBC 2.80* 2.57*   HGB 9.3* 8.4*   HCT 29.8* 26.6*   * 104*   MCH 33.2* 32.7   MCHC 31.2* 31.6   RDW 19.8* 20.1*    285       INR: No lab results found in last 7 days.    Glucose:   Recent Labs   Lab 10/06/22  1051 10/06/22  0544 10/05/22  2201 10/05/22  1544 10/05/22  1002   * 144*  133* 175* 130* 179*       Blood Gas:   Recent Labs   Lab 10/03/22  0547   PHV 7.37   PCO2V 64*   PO2V 71*   HCO3V 37*   LINDY 10.2*   O2PER 20       Culture Data No results for input(s): CULT in the last 168 hours.    Virology Data:   Lab Results   Component Value Date    FLUAH1 Not Detected 09/13/2022    FLUAH3 Not Detected 09/13/2022    EN3439 Not Detected 09/13/2022    IFLUB Not Detected 09/13/2022    RSVA Not Detected 09/13/2022    RSVB Not Detected 09/13/2022    PIV1 Not Detected 09/13/2022    PIV2 Not Detected 09/13/2022    PIV3 Not Detected 09/13/2022    HMPV Not Detected 09/13/2022       Historical CMV results (last 3 of prior testing):  Lab Results   Component Value Date    CMVQNT Not Detected 10/05/2022     CMVQNT Not Detected 09/30/2022    CMVQNT Not Detected 09/13/2022     No results found for: CMVLOG    Urine Studies    Recent Labs   Lab Test 09/19/22  2254 08/01/22  0319 07/08/22  0831   URINEPH 7.0 8.0* 5.5   NITRITE Negative Negative Negative   LEUKEST Large* Negative Negative   WBCU 29*  --  1       Most Recent [place] Pulmonary Function Testing (FVC/FEV1 only)  FVC-Pre   Date Value Ref Range Status   09/07/2022 1.01 L    09/01/2022 1.07 L    08/24/2022 1.23 L    08/18/2022 1.33 L      FVC-%Pred-Pre   Date Value Ref Range Status   09/07/2022 33 %    09/01/2022 35 %    08/24/2022 40 %    08/18/2022 43 %      FEV1-Pre   Date Value Ref Range Status   09/07/2022 0.98 L    09/01/2022 1.02 L    08/24/2022 1.17 L    08/18/2022 1.22 L      FEV1-%Pred-Pre   Date Value Ref Range Status   09/07/2022 40 %    09/01/2022 42 %    08/24/2022 48 %    08/18/2022 50 %        IMAGING    Recent Results (from the past 48 hour(s))   XR Abdomen 1 View    Narrative    Exam: XR ABDOMEN 1 VIEW, 10/5/2022 2:17 PM    Indication: bloating, increased gastric output    Comparison: 11/20/2022 CT abdomen and pelvis    Findings:   Supine view of the abdomen. Bowel gas pattern is nonobstructive. Small  volume of pneumobilia. Common bile duct stent and pancreatic duct  stent projecting over the right mid abdomen. Percutaneous GJ-tube is  postpyloric with the tip likely in the proximal jejunum in the left  abdomen. There is some mild gaseous distention of bowel loops in the  right upper quadrant demonstrated however overall pattern does not  appear obstructed with gas present to the rectosigmoid colon.      Impression    Impression: No acute findings. Support devices as described in the  report.    I have personally reviewed the examination and initial interpretation  and I agree with the findings.    YANNICK BENAVIDEZ MD         SYSTEM ID:  [id number]   XR Chest 2 Views    Narrative    EXAMINATION:  XR CHEST 2 VIEWS 10/5/2022 2:18  PM.    COMPARISON: 9/3/2022 chest radiograph.    HISTORY:  interval follow up, lung transplant history    FINDINGS: Right internal jugular central venous catheter with tip  overlying the low SVC near cavoatrial junction. Stable clamshell  sternotomy wires. Gastrostomy tube with internal retention bulb  overlying left abdomen.    Cardiomediastinal silhouette and pulmonary vasculature are within  normal limits. Aortic arch calcifications. Unchanged perihilar  opacities. Persistent loculated fluid in the right major fissure.  Fluid collecting within minor fissure. Fluid collection along the free  wall of the left chest. Unchanged small left pleural effusion. Left  apical pleural thickening. No right-sided pleural effusion.  Degenerative changes of the visualized spine.      Impression    IMPRESSION:   1. Continued loculation of fluid in the right major fissure with fluid  collecting within the minor fissure.  2. Unchanged left-sided pleural effusion tracking along the left free  wall with left apical thickening.  3. Bilateral diffuse interstitial/airspace opacities, likely  atelectasis and/or consolidation.    I have personally reviewed the examination and initial interpretation  and I agree with the findings.    JOHANN MORAES MD         SYSTEM ID:  [id number]

## 2022-10-06 NOTE — PLAN OF CARE
Temp: 98.5  F (36.9  C) Temp src: Oral BP: 118/66 Pulse: 98   Resp: 18 SpO2: 98 % O2 Device: Nasal cannula Oxygen Delivery: 1 LPM     Shift: [id number]  D: Admitted 9/9 for increased ANAYA and daily hemoptysis to work up antibody medicated rejection versus infection. PMH of BSLT (6/22), pleural effusions, & severe gastroparesis s/p GJ tube placement 7/27.      I: Monitored vitals and assessed pt status.   Running: Continuous TF @ 40 mL/hr via J tube, G tube vented to gravity  PRN: Oxycodone 5mg for back pain  Tele: No tele orders  O2: RA, 1L NC at night  Mobility: Independent/SBA    A: A0x4. VSS. Afebrile. Minimal urine output, x1 liquid/loose stool during shift. Pt reported some abdominal discomfort and feeling full, some relief with G tube vented to gravity (~100 mL output, bag emptied). Back pain present below old CT sites, relief with prn oxycodone.     AM weight: 148 lb 1.6 oz (Standing scale)    Plan: Continue to monitor and follow POC. Discharge pending outpatient dialysis placement. Notify [facility] 10 with any changes.

## 2022-10-06 NOTE — PROGRESS NOTES
HEMODIALYSIS TREATMENT NOTE    Date: 10/6/2022  Time: 2.30 PM    Data:  Pre Wt: 67.2kg    Desired Wt: 65.2  kg   Post Wt:  Unable  Ultrafiltration - Post Run Net Total Removed (mL): 572 mL  Vascular Access Status: CVC  patent  Dialyzer Rinse: Light  Total Blood Volume Processed: 16.53 L   Total Dialysis (Treatment) Time: 45 minutes   Dialysate Bath: K 4, Ca 3  Heparin: None    Lab:   No    Assessment / Interventions: Pt alert & orient x4, denies pain & there was no sign of shortness of breath. 2.0 hours HD via RCVC.  with good flow. After 45 minutes on treatment , pt went to restroom urgently, on coming back she stated that she wanted to stopped treatment for the day. MD informed with okay to terminate treatment  for her.   Handoff report given & pt transferred back to floor in a stable condition.         Plan:    Per Renal Team

## 2022-10-07 ENCOUNTER — ANESTHESIA (OUTPATIENT)
Dept: SURGERY | Facility: CLINIC | Age: 62
DRG: 205 | End: 2022-10-07
Payer: MEDICARE

## 2022-10-07 ENCOUNTER — APPOINTMENT (OUTPATIENT)
Dept: GENERAL RADIOLOGY | Facility: CLINIC | Age: 62
DRG: 205 | End: 2022-10-07
Attending: INTERNAL MEDICINE
Payer: MEDICARE

## 2022-10-07 LAB
ACID FAST STAIN (ARUP): NORMAL
ALBUMIN SERPL BCG-MCNC: 3.4 G/DL (ref 3.5–5.2)
ALBUMIN SERPL BCG-MCNC: 3.8 G/DL (ref 3.5–5.2)
ALP SERPL-CCNC: 76 U/L (ref 35–104)
ALP SERPL-CCNC: 88 U/L (ref 35–104)
ALT SERPL W P-5'-P-CCNC: 15 U/L (ref 10–35)
ALT SERPL W P-5'-P-CCNC: 6 U/L (ref 10–35)
AMYLASE SERPL-CCNC: 42 U/L (ref 28–100)
ANION GAP SERPL CALCULATED.3IONS-SCNC: 11 MMOL/L (ref 7–15)
ANION GAP SERPL CALCULATED.3IONS-SCNC: 11 MMOL/L (ref 7–15)
AST SERPL W P-5'-P-CCNC: 19 U/L (ref 10–35)
AST SERPL W P-5'-P-CCNC: 20 U/L (ref 10–35)
BILIRUB SERPL-MCNC: 0.4 MG/DL
BILIRUB SERPL-MCNC: 0.4 MG/DL
BUN SERPL-MCNC: 34.4 MG/DL (ref 8–23)
BUN SERPL-MCNC: 35.3 MG/DL (ref 8–23)
CALCIUM SERPL-MCNC: 10 MG/DL (ref 8.8–10.2)
CALCIUM SERPL-MCNC: 9.8 MG/DL (ref 8.8–10.2)
CHLORIDE SERPL-SCNC: 95 MMOL/L (ref 98–107)
CHLORIDE SERPL-SCNC: 96 MMOL/L (ref 98–107)
CREAT SERPL-MCNC: 2.4 MG/DL (ref 0.51–0.95)
CREAT SERPL-MCNC: 2.48 MG/DL (ref 0.51–0.95)
DEPRECATED HCO3 PLAS-SCNC: 28 MMOL/L (ref 22–29)
DEPRECATED HCO3 PLAS-SCNC: 31 MMOL/L (ref 22–29)
ERYTHROCYTE [DISTWIDTH] IN BLOOD BY AUTOMATED COUNT: 19.9 % (ref 10–15)
ERYTHROCYTE [DISTWIDTH] IN BLOOD BY AUTOMATED COUNT: 20 % (ref 10–15)
GFR SERPL CREATININE-BSD FRML MDRD: 22 ML/MIN/1.73M2
GFR SERPL CREATININE-BSD FRML MDRD: 22 ML/MIN/1.73M2
GLUCOSE BLDC GLUCOMTR-MCNC: 117 MG/DL (ref 70–99)
GLUCOSE BLDC GLUCOMTR-MCNC: 119 MG/DL (ref 70–99)
GLUCOSE BLDC GLUCOMTR-MCNC: 159 MG/DL (ref 70–99)
GLUCOSE BLDC GLUCOMTR-MCNC: 175 MG/DL (ref 70–99)
GLUCOSE BLDC GLUCOMTR-MCNC: 224 MG/DL (ref 70–99)
GLUCOSE SERPL-MCNC: 106 MG/DL (ref 70–99)
GLUCOSE SERPL-MCNC: 111 MG/DL (ref 70–99)
HCT VFR BLD AUTO: 30.6 % (ref 35–47)
HCT VFR BLD AUTO: 33.3 % (ref 35–47)
HGB BLD-MCNC: 10.2 G/DL (ref 11.7–15.7)
HGB BLD-MCNC: 9.5 G/DL (ref 11.7–15.7)
INR PPP: 0.92 (ref 0.85–1.15)
LIPASE SERPL-CCNC: 13 U/L (ref 13–60)
MCH RBC QN AUTO: 32.9 PG (ref 26.5–33)
MCH RBC QN AUTO: 32.9 PG (ref 26.5–33)
MCHC RBC AUTO-ENTMCNC: 30.6 G/DL (ref 31.5–36.5)
MCHC RBC AUTO-ENTMCNC: 31 G/DL (ref 31.5–36.5)
MCV RBC AUTO: 106 FL (ref 78–100)
MCV RBC AUTO: 107 FL (ref 78–100)
PLATELET # BLD AUTO: 223 10E3/UL (ref 150–450)
PLATELET # BLD AUTO: 255 10E3/UL (ref 150–450)
POTASSIUM SERPL-SCNC: 3.9 MMOL/L (ref 3.4–5.3)
POTASSIUM SERPL-SCNC: 4.3 MMOL/L (ref 3.4–5.3)
PROT SERPL-MCNC: 5.8 G/DL (ref 6.4–8.3)
PROT SERPL-MCNC: 6.5 G/DL (ref 6.4–8.3)
RBC # BLD AUTO: 2.89 10E6/UL (ref 3.8–5.2)
RBC # BLD AUTO: 3.1 10E6/UL (ref 3.8–5.2)
SODIUM SERPL-SCNC: 135 MMOL/L (ref 136–145)
SODIUM SERPL-SCNC: 137 MMOL/L (ref 136–145)
WBC # BLD AUTO: 6.7 10E3/UL (ref 4–11)
WBC # BLD AUTO: 6.8 10E3/UL (ref 4–11)

## 2022-10-07 PROCEDURE — 360N000083 HC SURGERY LEVEL 3 W/ FLUORO, PER MIN: Performed by: INTERNAL MEDICINE

## 2022-10-07 PROCEDURE — 250N000013 HC RX MED GY IP 250 OP 250 PS 637: Performed by: NURSE PRACTITIONER

## 2022-10-07 PROCEDURE — 214N000001 HC R&B CCU UMMC

## 2022-10-07 PROCEDURE — 0FC98ZZ EXTIRPATION OF MATTER FROM COMMON BILE DUCT, VIA NATURAL OR ARTIFICIAL OPENING ENDOSCOPIC: ICD-10-PCS | Performed by: INTERNAL MEDICINE

## 2022-10-07 PROCEDURE — 250N000025 HC SEVOFLURANE, PER MIN: Performed by: INTERNAL MEDICINE

## 2022-10-07 PROCEDURE — 250N000013 HC RX MED GY IP 250 OP 250 PS 637

## 2022-10-07 PROCEDURE — 250N000011 HC RX IP 250 OP 636: Performed by: ANESTHESIOLOGY

## 2022-10-07 PROCEDURE — 710N000010 HC RECOVERY PHASE 1, LEVEL 2, PER MIN: Performed by: INTERNAL MEDICINE

## 2022-10-07 PROCEDURE — 82150 ASSAY OF AMYLASE: CPT | Performed by: INTERNAL MEDICINE

## 2022-10-07 PROCEDURE — 36415 COLL VENOUS BLD VENIPUNCTURE: CPT

## 2022-10-07 PROCEDURE — 250N000011 HC RX IP 250 OP 636: Performed by: STUDENT IN AN ORGANIZED HEALTH CARE EDUCATION/TRAINING PROGRAM

## 2022-10-07 PROCEDURE — 255N000002 HC RX 255 OP 636: Performed by: INTERNAL MEDICINE

## 2022-10-07 PROCEDURE — C1769 GUIDE WIRE: HCPCS | Performed by: INTERNAL MEDICINE

## 2022-10-07 PROCEDURE — 85027 COMPLETE CBC AUTOMATED: CPT | Performed by: INTERNAL MEDICINE

## 2022-10-07 PROCEDURE — 80053 COMPREHEN METABOLIC PANEL: CPT

## 2022-10-07 PROCEDURE — 0FPD8DZ REMOVAL OF INTRALUMINAL DEVICE FROM PANCREATIC DUCT, VIA NATURAL OR ARTIFICIAL OPENING ENDOSCOPIC: ICD-10-PCS | Performed by: INTERNAL MEDICINE

## 2022-10-07 PROCEDURE — 84155 ASSAY OF PROTEIN SERUM: CPT | Performed by: INTERNAL MEDICINE

## 2022-10-07 PROCEDURE — 250N000009 HC RX 250: Performed by: ANESTHESIOLOGY

## 2022-10-07 PROCEDURE — 85610 PROTHROMBIN TIME: CPT | Performed by: INTERNAL MEDICINE

## 2022-10-07 PROCEDURE — 250N000012 HC RX MED GY IP 250 OP 636 PS 637: Performed by: PHYSICIAN ASSISTANT

## 2022-10-07 PROCEDURE — 250N000009 HC RX 250: Performed by: INTERNAL MEDICINE

## 2022-10-07 PROCEDURE — 83690 ASSAY OF LIPASE: CPT | Performed by: INTERNAL MEDICINE

## 2022-10-07 PROCEDURE — 258N000003 HC RX IP 258 OP 636: Performed by: ANESTHESIOLOGY

## 2022-10-07 PROCEDURE — 250N000012 HC RX MED GY IP 250 OP 636 PS 637: Performed by: NURSE PRACTITIONER

## 2022-10-07 PROCEDURE — 99233 SBSQ HOSP IP/OBS HIGH 50: CPT | Performed by: INTERNAL MEDICINE

## 2022-10-07 PROCEDURE — 36415 COLL VENOUS BLD VENIPUNCTURE: CPT | Performed by: INTERNAL MEDICINE

## 2022-10-07 PROCEDURE — 0FPB8DZ REMOVAL OF INTRALUMINAL DEVICE FROM HEPATOBILIARY DUCT, VIA NATURAL OR ARTIFICIAL OPENING ENDOSCOPIC: ICD-10-PCS | Performed by: INTERNAL MEDICINE

## 2022-10-07 PROCEDURE — 99232 SBSQ HOSP IP/OBS MODERATE 35: CPT | Performed by: INTERNAL MEDICINE

## 2022-10-07 PROCEDURE — 250N000009 HC RX 250: Performed by: NURSE PRACTITIONER

## 2022-10-07 PROCEDURE — 250N000013 HC RX MED GY IP 250 OP 250 PS 637: Performed by: INTERNAL MEDICINE

## 2022-10-07 PROCEDURE — 999N000141 HC STATISTIC PRE-PROCEDURE NURSING ASSESSMENT: Performed by: INTERNAL MEDICINE

## 2022-10-07 PROCEDURE — 999N000181 XR SURGERY CARM FLUORO GREATER THAN 5 MIN W STILLS: Mod: TC

## 2022-10-07 PROCEDURE — 272N000001 HC OR GENERAL SUPPLY STERILE: Performed by: INTERNAL MEDICINE

## 2022-10-07 PROCEDURE — 370N000017 HC ANESTHESIA TECHNICAL FEE, PER MIN: Performed by: INTERNAL MEDICINE

## 2022-10-07 PROCEDURE — 250N000013 HC RX MED GY IP 250 OP 250 PS 637: Performed by: STUDENT IN AN ORGANIZED HEALTH CARE EDUCATION/TRAINING PROGRAM

## 2022-10-07 RX ORDER — HYDROMORPHONE HCL IN WATER/PF 6 MG/30 ML
0.2 PATIENT CONTROLLED ANALGESIA SYRINGE INTRAVENOUS EVERY 5 MIN PRN
Status: DISCONTINUED | OUTPATIENT
Start: 2022-10-07 | End: 2022-10-07 | Stop reason: HOSPADM

## 2022-10-07 RX ORDER — ONDANSETRON 2 MG/ML
INJECTION INTRAMUSCULAR; INTRAVENOUS PRN
Status: DISCONTINUED | OUTPATIENT
Start: 2022-10-07 | End: 2022-10-07

## 2022-10-07 RX ORDER — LEVOFLOXACIN 5 MG/ML
INJECTION, SOLUTION INTRAVENOUS PRN
Status: DISCONTINUED | OUTPATIENT
Start: 2022-10-07 | End: 2022-10-07

## 2022-10-07 RX ORDER — SODIUM CHLORIDE 9 MG/ML
INJECTION, SOLUTION INTRAVENOUS CONTINUOUS PRN
Status: DISCONTINUED | OUTPATIENT
Start: 2022-10-07 | End: 2022-10-07

## 2022-10-07 RX ORDER — ONDANSETRON 2 MG/ML
4 INJECTION INTRAMUSCULAR; INTRAVENOUS EVERY 6 HOURS PRN
Status: DISCONTINUED | OUTPATIENT
Start: 2022-10-07 | End: 2022-10-08 | Stop reason: HOSPADM

## 2022-10-07 RX ORDER — LIDOCAINE HYDROCHLORIDE 20 MG/ML
INJECTION, SOLUTION INFILTRATION; PERINEURAL PRN
Status: DISCONTINUED | OUTPATIENT
Start: 2022-10-07 | End: 2022-10-07

## 2022-10-07 RX ORDER — FENTANYL CITRATE 50 UG/ML
INJECTION, SOLUTION INTRAMUSCULAR; INTRAVENOUS PRN
Status: DISCONTINUED | OUTPATIENT
Start: 2022-10-07 | End: 2022-10-07

## 2022-10-07 RX ORDER — ONDANSETRON 2 MG/ML
4 INJECTION INTRAMUSCULAR; INTRAVENOUS EVERY 30 MIN PRN
Status: DISCONTINUED | OUTPATIENT
Start: 2022-10-07 | End: 2022-10-07 | Stop reason: HOSPADM

## 2022-10-07 RX ORDER — OXYCODONE HYDROCHLORIDE 5 MG/1
5 TABLET ORAL EVERY 4 HOURS PRN
Status: DISCONTINUED | OUTPATIENT
Start: 2022-10-07 | End: 2022-10-07 | Stop reason: HOSPADM

## 2022-10-07 RX ORDER — PROPOFOL 10 MG/ML
INJECTION, EMULSION INTRAVENOUS PRN
Status: DISCONTINUED | OUTPATIENT
Start: 2022-10-07 | End: 2022-10-07

## 2022-10-07 RX ORDER — ONDANSETRON 4 MG/1
4 TABLET, ORALLY DISINTEGRATING ORAL EVERY 6 HOURS PRN
Status: DISCONTINUED | OUTPATIENT
Start: 2022-10-07 | End: 2022-10-08 | Stop reason: HOSPADM

## 2022-10-07 RX ORDER — SODIUM CHLORIDE, SODIUM LACTATE, POTASSIUM CHLORIDE, CALCIUM CHLORIDE 600; 310; 30; 20 MG/100ML; MG/100ML; MG/100ML; MG/100ML
INJECTION, SOLUTION INTRAVENOUS CONTINUOUS
Status: DISCONTINUED | OUTPATIENT
Start: 2022-10-07 | End: 2022-10-07 | Stop reason: HOSPADM

## 2022-10-07 RX ORDER — FLUMAZENIL 0.1 MG/ML
0.2 INJECTION, SOLUTION INTRAVENOUS
Status: ACTIVE | OUTPATIENT
Start: 2022-10-07 | End: 2022-10-07

## 2022-10-07 RX ORDER — LIDOCAINE 40 MG/G
CREAM TOPICAL
Status: DISCONTINUED | OUTPATIENT
Start: 2022-10-07 | End: 2022-10-07 | Stop reason: HOSPADM

## 2022-10-07 RX ORDER — FENTANYL CITRATE 50 UG/ML
25 INJECTION, SOLUTION INTRAMUSCULAR; INTRAVENOUS EVERY 5 MIN PRN
Status: DISCONTINUED | OUTPATIENT
Start: 2022-10-07 | End: 2022-10-07 | Stop reason: HOSPADM

## 2022-10-07 RX ORDER — IOPAMIDOL 510 MG/ML
INJECTION, SOLUTION INTRAVASCULAR PRN
Status: DISCONTINUED | OUTPATIENT
Start: 2022-10-07 | End: 2022-10-07 | Stop reason: HOSPADM

## 2022-10-07 RX ORDER — DEXAMETHASONE SODIUM PHOSPHATE 4 MG/ML
INJECTION, SOLUTION INTRA-ARTICULAR; INTRALESIONAL; INTRAMUSCULAR; INTRAVENOUS; SOFT TISSUE PRN
Status: DISCONTINUED | OUTPATIENT
Start: 2022-10-07 | End: 2022-10-07

## 2022-10-07 RX ORDER — ONDANSETRON 4 MG/1
4 TABLET, ORALLY DISINTEGRATING ORAL EVERY 30 MIN PRN
Status: DISCONTINUED | OUTPATIENT
Start: 2022-10-07 | End: 2022-10-07 | Stop reason: HOSPADM

## 2022-10-07 RX ADMIN — CALCIUM CARBONATE 600 MG (1,500 MG)-VITAMIN D3 400 UNIT TABLET 1 TABLET: at 11:02

## 2022-10-07 RX ADMIN — PHENYLEPHRINE HYDROCHLORIDE 200 MCG: 10 INJECTION INTRAVENOUS at 08:11

## 2022-10-07 RX ADMIN — ORAL VEHICLES - SUSP 12.5 MG: SUSPENSION at 11:05

## 2022-10-07 RX ADMIN — PHENYLEPHRINE HYDROCHLORIDE 100 MCG: 10 INJECTION INTRAVENOUS at 08:16

## 2022-10-07 RX ADMIN — LEVOFLOXACIN 500 MG: 5 INJECTION, SOLUTION INTRAVENOUS at 07:43

## 2022-10-07 RX ADMIN — ORAL VEHICLES - SUSP 12.5 MG: SUSPENSION at 19:50

## 2022-10-07 RX ADMIN — PROPOFOL 30 MG: 10 INJECTION, EMULSION INTRAVENOUS at 09:01

## 2022-10-07 RX ADMIN — HEPARIN SODIUM 5000 UNITS: 5000 INJECTION, SOLUTION INTRAVENOUS; SUBCUTANEOUS at 19:51

## 2022-10-07 RX ADMIN — OXYCODONE HYDROCHLORIDE 5 MG: 5 SOLUTION ORAL at 05:37

## 2022-10-07 RX ADMIN — Medication 40 MG: at 19:51

## 2022-10-07 RX ADMIN — PHENYLEPHRINE HYDROCHLORIDE 100 MCG: 10 INJECTION INTRAVENOUS at 07:52

## 2022-10-07 RX ADMIN — LOPERAMIDE HCL 2 MG: 1 SOLUTION ORAL at 19:50

## 2022-10-07 RX ADMIN — PHENYLEPHRINE HYDROCHLORIDE 200 MCG: 10 INJECTION INTRAVENOUS at 08:29

## 2022-10-07 RX ADMIN — SUGAMMADEX 200 MG: 100 INJECTION, SOLUTION INTRAVENOUS at 09:16

## 2022-10-07 RX ADMIN — SODIUM CHLORIDE: 0.9 INJECTION, SOLUTION INTRAVENOUS at 07:35

## 2022-10-07 RX ADMIN — OXYCODONE HYDROCHLORIDE 5 MG: 5 SOLUTION ORAL at 21:48

## 2022-10-07 RX ADMIN — Medication 70 MG: at 07:45

## 2022-10-07 RX ADMIN — DEXAMETHASONE SODIUM PHOSPHATE 4 MG: 4 INJECTION, SOLUTION INTRA-ARTICULAR; INTRALESIONAL; INTRAMUSCULAR; INTRAVENOUS; SOFT TISSUE at 08:06

## 2022-10-07 RX ADMIN — Medication 10 MG: at 08:36

## 2022-10-07 RX ADMIN — Medication 1 PACKET: at 11:07

## 2022-10-07 RX ADMIN — INSULIN ASPART 2 UNITS: 100 INJECTION, SOLUTION INTRAVENOUS; SUBCUTANEOUS at 21:48

## 2022-10-07 RX ADMIN — CALCIUM CARBONATE 600 MG (1,500 MG)-VITAMIN D3 400 UNIT TABLET 1 TABLET: at 17:12

## 2022-10-07 RX ADMIN — PROPOFOL 50 MG: 10 INJECTION, EMULSION INTRAVENOUS at 07:46

## 2022-10-07 RX ADMIN — PROPOFOL 50 MG: 10 INJECTION, EMULSION INTRAVENOUS at 07:47

## 2022-10-07 RX ADMIN — Medication 1 PACKET: at 19:51

## 2022-10-07 RX ADMIN — Medication 40 MG: at 11:04

## 2022-10-07 RX ADMIN — Medication 100 MG: at 10:59

## 2022-10-07 RX ADMIN — INSULIN ASPART 1 UNITS: 100 INJECTION, SOLUTION INTRAVENOUS; SUBCUTANEOUS at 13:59

## 2022-10-07 RX ADMIN — NYSTATIN 1000000 UNITS: 100000 SUSPENSION ORAL at 11:04

## 2022-10-07 RX ADMIN — PREDNISONE 7.5 MG: 5 SOLUTION ORAL at 19:50

## 2022-10-07 RX ADMIN — PREDNISONE 10 MG: 5 SOLUTION ORAL at 11:04

## 2022-10-07 RX ADMIN — PROPOFOL 20 MG: 10 INJECTION, EMULSION INTRAVENOUS at 08:57

## 2022-10-07 RX ADMIN — GABAPENTIN 200 MG: 250 SOLUTION ORAL at 21:12

## 2022-10-07 RX ADMIN — ONDANSETRON 4 MG: 2 INJECTION INTRAMUSCULAR; INTRAVENOUS at 09:01

## 2022-10-07 RX ADMIN — LIDOCAINE HYDROCHLORIDE 80 MG: 20 INJECTION, SOLUTION INFILTRATION; PERINEURAL at 07:45

## 2022-10-07 RX ADMIN — HEPARIN SODIUM 5000 UNITS: 5000 INJECTION, SOLUTION INTRAVENOUS; SUBCUTANEOUS at 11:03

## 2022-10-07 RX ADMIN — PROPOFOL 100 MG: 10 INJECTION, EMULSION INTRAVENOUS at 07:45

## 2022-10-07 RX ADMIN — NYSTATIN 1000000 UNITS: 100000 SUSPENSION ORAL at 19:50

## 2022-10-07 RX ADMIN — PHENYLEPHRINE HYDROCHLORIDE 200 MCG: 10 INJECTION INTRAVENOUS at 08:01

## 2022-10-07 RX ADMIN — FENTANYL CITRATE 100 MCG: 50 INJECTION, SOLUTION INTRAMUSCULAR; INTRAVENOUS at 08:06

## 2022-10-07 RX ADMIN — NYSTATIN 1000000 UNITS: 100000 SUSPENSION ORAL at 17:12

## 2022-10-07 RX ADMIN — TACROLIMUS 4 MG: 5 CAPSULE ORAL at 17:12

## 2022-10-07 RX ADMIN — PROPOFOL 20 MG: 10 INJECTION, EMULSION INTRAVENOUS at 09:13

## 2022-10-07 RX ADMIN — DAPSONE 50 MG: 100 TABLET ORAL at 11:00

## 2022-10-07 RX ADMIN — Medication 5 ML: at 11:05

## 2022-10-07 RX ADMIN — TACROLIMUS 4 MG: 5 CAPSULE ORAL at 11:00

## 2022-10-07 RX ADMIN — CYANOCOBALAMIN TAB 500 MCG 500 MCG: 500 TAB at 11:02

## 2022-10-07 ASSESSMENT — ACTIVITIES OF DAILY LIVING (ADL)
ADLS_ACUITY_SCORE: 29
ADLS_ACUITY_SCORE: 29
ADLS_ACUITY_SCORE: 33
ADLS_ACUITY_SCORE: 33
ADLS_ACUITY_SCORE: 29
ADLS_ACUITY_SCORE: 34
ADLS_ACUITY_SCORE: 33
ADLS_ACUITY_SCORE: 29
ADLS_ACUITY_SCORE: 29
ADLS_ACUITY_SCORE: 33

## 2022-10-07 ASSESSMENT — COPD QUESTIONNAIRES: COPD: 1

## 2022-10-07 NOTE — PROGRESS NOTES
[facility]    Medicine Progress Note - Hospitalist Service, GOLD TEAM 10    Date of Admission:  9/9/2022    Assessment & Plan        62 year old female with pertinent hx of end stage COPD s/p bilateral sequential lung transplant (6/22) on triple IS ( MMF/Tacro/pred), pyloric ulcer, recent ERCP c/f hemobilia, gastroparesis s/p GJ tube placement and Percutaneous J tube presents for a planned admission from transplant pulmonology to work up antibody medicated rejection versus infection.     Today's updates  - S/p ERCP, stent removed  - Begin to cycle TF 12pm-6am   - Discontinue Lantus (had none for several days)  - Plan for HD on Saturday and discharge, Outpt HD chair is TuTS  - Prednisone taper due 10/13 (not yet ordered)    #Acute kidney injury AKIN stage II on CKD stage IIIb (POA)-->ESRD on HD  :: Patient had been variable GFRs 30-50s in the last few months. Most recent Cr trend 1.5-1.9 1.56 9/8/22 cystatin C obtained and GFR ~10  - Renal consulted, initiated on iHD  - Outpatient iHD is On TTS     #Diarrhea likely secondary to tube feeding, not present on admission, improved  :: repeatedly ruled out for C. difficile colitis.   :: c/w fibers at 28 g and Imodium as needed.     #Acute hypoxic hypercarbic respiratory failure secondary to bibasilar pleural effusion  Presented with chest tubes bilaterally on top of end-stage COPD s/p bilateral sequential lung transplant on 6/22/2022 complicated by chronic respiratory acidosis with compensation by metabolic alkalosis, all are present on admission   This is the main problem that led to this admission, had bilateral chest tubes; patient had her right-sided chest tube removed on 9/22/2022 and removal of Left-sided chest tube 9/29/2022   -Continue 3 drug immunosuppression with azathioprine, prednisone, and tacrolimus  -Prednisone taper to be performed on 10/13/2022  -Continue oxycodone a prn pain  -Increased gabapentin to 200 mg  nightly  -Continue dapsone for PJP prophylaxis  -Continue folic acid while on dapsone  -Appreciate transplant pulmonology service     #Severe gastroparesis s/p  GJ tube placement 7/27/2022  #Significant delayed gastric emptying s/p gastrojejunostomy tube placement, present on admission  We will continue to utilize jejunal tube for nutrition.   -repeat GE study 9/30/22, no major change  -RD nutrition consult placed for TF  -TF to be cycled 12pm-6am  -Percutaneous j tube in place with G venting to gravity (vent all night and PRN)  -KUB on 10/5 ok      #Steroid-induced hyperglycemia and diabetes mellitus  - Receiving minimal insulin, no Lantus for several days  - Will discontinue Lantus, likely will not need insulin on discharge     #HTN  # A flutter with RVR/SVT  - Has recently completed zio patch.  - Continue PTA metoprolol 50 mg BID    # Acute blood loss anemia secondary to pyloric ulcer on top of chronic anemia of chronic disease, all are present on admission  -Continue pantoprazole 40 mg twice daily  -Repeat endoscopy on 10/7 for surveillance     #Acute metabolic encephalopathy secondary to urinary tract infection, not clear if present on admission, ordered a total of 5-day course of ceftriaxone for which the patient encephalopathy resolved     # extra hepatic and intrahepatic biliary dilation c/f hemobilia   # Intra ductal papillary mucinous neoplasm   - s/p ERCP 8/11 showed hemobilia. Repeat ERCP on 10/7, stents removed   - No longer need GI follow-up      #Acute on chronic heart failure with preserved ejection fraction LVEF 65%, the patient was appropriately diuresed, follow-up as outpatient         Diet: Snacks/Supplements Adult: Nepro Oral Supplement; Between Meals  Clear Liquid Diet  Adult Formula Drip Feeding: Specified Time Nepro with Carbsteady; Jejunostomy; Goal Rate: 55 mL/hr x 18 hrs (12:00-06:00 vs adjust timing due to dialysis) - adjusted timing per provider 10/7; mL/hr; From: 12:00 PM; 6:00 AM;  Medication - Feeding Tu...    DVT Prophylaxis: Heparin SQ  Dong Catheter: Not present  Central Lines: PRESENT  CVC Double Lumen Right Internal jugular Tunneled-Site Assessment: WDL  Cardiac Monitoring: None  Code Status: Full Code      Disposition Plan      Expected Discharge Date: 10/08/2022,  9:00 AM      Discharge Comments: Will dc after HD TBD -[facility] the [facility], so [place] would probably work. She will need transportation.      The patient's care was discussed with the Patient and Pulmonary Consultant.    Greg Pritchard MD  Hospitalist Service, GOLD TEAM 10  [facility]  Securely message with the Vocera Web Console (learn more here)  Text page via [facility] Paging/Directory   Please see signed in provider for up to date coverage information      Clinically Significant Risk Factors Present on Admission                      ______________________________________________________________________    Interval History   No acute events overnight, no longer having abdominal pain or nausea,     Data reviewed today: I reviewed all medications, new labs and imaging results over the last 24 hours.     Physical Exam   Vital Signs: Temp: 99.3  F (37.4  C) Temp src: Oral BP: 127/76 Pulse: 84   Resp: 18 SpO2: 99 % O2 Device: None (Room air) Oxygen Delivery: 1 LPM  Weight: 148 lbs 1.6 oz    Constitutional:Awake, alert, cooperative, no apparent distress  Respiratory: Clear to auscultation bilaterally  Cardiovascular: Regular rate and rhythm,   GI: Normal bowel sounds, soft, non-distended, non-tender  Skin/Integumen: No rashes, no cyanosis       Data   Recent Labs   Lab 10/07/22  1356 10/07/22  0951 10/07/22  0627 10/07/22  0548 10/06/22  1051 10/06/22  0811 10/06/22  0544   WBC  --   --  6.7 6.8  --  7.9  --    HGB  --   --  9.5* 10.2*  --  9.3*  --    MCV  --   --  106* 107*  --  106*  --    PLT  --   --  223 255  --  214  --    INR  --    --  0.92  --   --   --   --    NA  --   --  135* 137  --   --  132*   POTASSIUM  --   --  4.3 3.9  --   --  3.9   CHLORIDE  --   --  96* 95*  --   --  93*   CO2  --   --  28 31*  --   --  34*   BUN  --   --  35.3* 34.4*  --   --  27.3*   CR  --   --  2.48* 2.40*  --   --  2.06*   ANIONGAP  --   --  11 11  --   --  5*   ESTUARDO  --   --  9.8 10.0  --   --  9.0   * 119* 111* 106*   < >  --  144*  133*   ALBUMIN  --   --  3.4* 3.8  --   --  3.2*   PROTTOTAL  --   --  5.8* 6.5  --   --  5.4*   BILITOTAL  --   --  0.4 0.4  --   --  0.3   ALKPHOS  --   --  76 88  --   --  92   ALT  --   --  6* 15  --   --  11   AST  --   --  19 20  --   --  21   LIPASE  --   --  13  --   --   --  17    < > = values in this interval not displayed.     Recent Results (from the past 24 hour(s))   XR Surgery LARISA G/T 5 Min Fluoro w Stills    Narrative    This exam was marked as non-reportable because it will not be read by a   radiologist or a [place] non-radiologist provider.           Medications     dextrose       - MEDICATION INSTRUCTIONS -       - MEDICATION INSTRUCTIONS -         azaTHIOprine  100 mg Per J Tube Daily     B and C vitamin Complex with folic acid  5 mL Oral Daily     calcium carbonate 600 mg-vitamin D 400 units  1 tablet Per J Tube BID w/meals     cyanocobalamin  500 mcg Per Feeding Tube Daily     dapsone  50 mg Per J Tube Q Mon Wed Fri AM     fiber modular (NUTRISOURCE FIBER)  1 packet Per Feeding Tube BID     [Held by provider] folic acid  1 mg Oral or Feeding Tube Daily     gabapentin  200 mg Oral At Bedtime     heparin ANTICOAGULANT  5,000 Units Subcutaneous Q12H     insulin aspart  1-6 Units Subcutaneous Q6H     metoprolol  12.5 mg Per J Tube BID     nystatin  1,000,000 Units Swish & Swallow 4x Daily     pantoprazole  40 mg Per J Tube BID     predniSONE  10 mg Per J Tube Daily     predniSONE  7.5 mg Per J Tube QPM     sodium chloride (PF)  3 mL Intracatheter Q8H     tacrolimus  4 mg Per J Tube BID IS     **a  portion of my previous note is copied and pasted above, it has been edited as needed to reflect events for today**

## 2022-10-07 NOTE — ANESTHESIA PROCEDURE NOTES
Airway       Patient location during procedure: OR       Procedure Start/Stop Times: 10/7/2022 7:47 AM  Staff -        CRNA: Lamont Trujillo APRN CRNA       Performed By: CRNAIndications and Patient Condition       Indications for airway management: vinayak-procedural       Induction type:intravenous       Mask difficulty assessment: 2 - vent by mask + OA or adjuvant +/- NMBA    Final Airway Details       Final airway type: endotracheal airway       Successful airway: ETT - single and Oral  Endotracheal Airway Details        ETT size (mm): 7.0       Cuffed: yes       Successful intubation technique: direct laryngoscopy       DL Blade Type: MAC 3       Grade View of Cords: 1       Adjucts: stylet       Position: Right       Measured from: gums/teeth       Secured at (cm): 21       Bite block used: None    Post intubation assessment        Placement verified by: capnometry, equal breath sounds and chest rise        Number of attempts at approach: 1       Secured with: pink tape       Ease of procedure: easy       Dentition: Intact and Unchanged    Medication(s) Administered   Medication Administration Time: 10/7/2022 7:47 AM

## 2022-10-07 NOTE — PROGRESS NOTES
Care Management Follow Up    Length of Stay (days): 28    Expected Discharge Date: 10/08/2022     Concerns to be Addressed: all concerns addressed in this encounter     Patient plan of care discussed at interdisciplinary rounds: No    Anticipated Discharge Disposition: Home     Anticipated Discharge Services:    Anticipated Discharge DME:      Patient/family educated on Medicare website which has current facility and service quality ratings:    Education Provided on the Discharge Plan:    Patient/Family in Agreement with the Plan:      Referrals Placed by CM/SW:    Private pay costs discussed: Not applicable    Additional Information:  discharge planning arranged by care coordinator.  I had follow up supportive counseling visit with patient today.  Discussed her progress in recent weeks.  She looks and feels better. Is looking forward to discharge to [facility] tomorrow.  Her children continue to take turns caring for her, switching off each week.  Patient states she is coping well and her mood is good.  Support and encouragement provided.        SAMREEN ConcepcionSW

## 2022-10-07 NOTE — ANESTHESIA CARE TRANSFER NOTE
Patient: Sofie Rodriguez    Procedure: Procedure(s):  ENDOSCOPIC RETROGRADE CHOLANGIOPANCREATOGRAPHY with biliary and pancreatic stent removal, debris removal  Replace Gastrojejunostomy Tube       Diagnosis: Dilated bile duct [K83.8]  Diagnosis Additional Information: No value filed.    Anesthesia Type:   No value filed.     Note:    Oropharynx: oropharynx clear of all foreign objects and spontaneously breathing  Level of Consciousness: awake  Oxygen Supplementation: face mask  Level of Supplemental Oxygen (L/min / FiO2): 4  Independent Airway: airway patency satisfactory and stable  Dentition: dentition unchanged  Vital Signs Stable: post-procedure vital signs reviewed and stable  Report to RN Given: handoff report given  Patient transferred to: PACU    Handoff Report: Identifed the Patient, Identified the Reponsible Provider, Reviewed the pertinent medical history, Discussed the surgical course, Reviewed Intra-OP anesthesia mangement and issues during anesthesia, Set expectations for post-procedure period and Allowed opportunity for questions and acknowledgement of understanding      Vitals:  Vitals Value Taken Time   /72 10/07/22 0936   Temp 36.4  C (97.6  F) 10/07/22 0933   Pulse 84 10/07/22 0940   Resp 14    SpO2 100 % 10/07/22 0940   Vitals shown include unvalidated device data.    Electronically Signed By: RUFUS DEJESUS CRNA  October 7, 2022  9:41 AM

## 2022-10-07 NOTE — PROGRESS NOTES
Care Coordinator  D/I: per Dr Pritchard --pt to have HD run tomorrow and discharge to [facility].  P: Per Pulm notes--they have ordered oximetry test.  I have called Tanya @ [facility]  [address] , [address]  [place] [address]  Phone: [phone]  Fax: [phone]   Weekend RN CC to fax them orders/summary.   and informed her that she will begin Tuesday, 10/11 and will be there @ 6am--Di 6C RN CC arranged rides.  I will add her to the weekend CC list.

## 2022-10-07 NOTE — ANESTHESIA PREPROCEDURE EVALUATION
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     Hepatitis 2017    Hep C, [facility]     HTN (hypertension)      Osteopenia       Past Surgical History:   Procedure Laterality Date     BRONCHOSCOPY (RIGID OR FLEXIBLE), DIAGNOSTIC N/A 2022    Procedure: BRONCHOSCOPY, DIAGNOSTIC- inspection Bronch;  Surgeon: Kamala Lovell MD;  Location:  GI     BRONCHOSCOPY (RIGID OR FLEXIBLE), DIAGNOSTIC N/A 2022    Procedure: INSPECTION BRONCHOSCOPY, WITH BRONCHOALVEOLAR LAVAGE;  Surgeon: Jose R Mccullough MD;  Location:  GI     BRONCHOSCOPY FLEXIBLE AND RIGID N/A 2022    Procedure: BRONCHOSCOPY inspection only;  Surgeon: Bob Liao MD;  Location:  GI     COLONOSCOPY       CV CORONARY ANGIOGRAM N/A 2021    Procedure: CV CORONARY ANGIOGRAM;  Surgeon: Alexander Cuellar MD;  Location:  HEART CARDIAC CATH LAB     CV RIGHT HEART CATH MEASUREMENTS RECORDED N/A 2021    Procedure: CV RIGHT HEART CATH;  Surgeon: Alexander Cuellar MD;  Location:  HEART CARDIAC CATH LAB     ENDOSCOPIC RETROGRADE CHOLANGIOPANCREATOGRAM N/A 2022    Procedure: ENDOSCOPIC RETROGRADE CHOLANGIOPANCREATOGRAPHY WITH PANCREATIC DUCT NEEDLE KNIFE AND STENT PLACEMENT, BILE DUCT SPHINCTEROTOMY, BLOOD/DEBRIS REMOVAL AND STENT PLACEMENT;  Surgeon: Cosmo Arroyo MD;  Location:  OR     ENT SURGERY  1974    tonsillectomy     ENTEROSCOPY SMALL BOWEL N/A 2022    Procedure: SMALL BOWEL ENTEROSCOPY;  Surgeon: Cosmo Arroyo MD;  Location:  OR     ESOPHAGOGASTRODUODENOSCOPY, WITH NASOGASTRIC TUBE INSERTION N/A 2022     Procedure: ESOPHAGOGASTRODUODENOSCOPY, WITH NASOJEJUNAL TUBE INSERTION;  Surgeon: Ozzy Nickerson MD;  Location:  GI     ESOPHAGOSCOPY, GASTROSCOPY, DUODENOSCOPY (EGD), COMBINED N/A 8/3/2022    Procedure: ESOPHAGOGASTRODUODENOSCOPY (EGD);  Surgeon: Ira Andres MD;  Location:  GI     HAND SURGERY       INSERT CHEST TUBE Right 2022    Procedure: Insert chest tube;  Surgeon: Jose R Mccullough MD;  Location:  GI     IR CVC TUNNEL PLACEMENT > 5 YRS OF AGE  2022     IR GASTRO JEJUNOSTOMY TUBE CHANGE  2022     IR GASTRO JEJUNOSTOMY TUBE PLACEMENT  2022     IR THORACENTESIS  2022     LEEP TX, CERVICAL  2017    HECTOR III     LYMPH NODE BIOPSY Left     Left axilla, benign- [place]     MIDLINE INSERTION - DOUBLE LUMEN Left 2022    20cm, Basilic vein     THORACENTESIS Left 2022    Procedure: THORACENTESIS;  Surgeon: Bo Capone PA-C;  Location: [facility] OR     THORACENTESIS Left 2022    Procedure: Thoracentesis;  Surgeon: Jose R Mccullough MD;  Location:  GI     THROMBECTOMY UPPER EXTREMITY Left 2022    Procedure: LEFT RADIAL ARM THROMBECTOMY;  Surgeon: Christie Graham MD;  Location:  OR     TRANSPLANT LUNG RECIPIENT SINGLE X2 Bilateral 2022    Procedure: Clamshell Incision, Bilateral Sequential Lung Transplant, On Cardiopulmonary Bypass, Flexible Bronchoscopy;  Surgeon: Sue Sunshine MD;  Location:  OR      Allergies   Allergen Reactions     Blood Transfusion Related (Informational Only) Other (See Comments)     Patient has a history of a clinically significant antibody against RBC antigens.  A delay in compatible RBCs may occur.       Social History     Tobacco Use     Smoking status: Former Smoker     Years: 30.00     Types: Cigarettes     Quit date: 2020     Years since quittin.9     Smokeless tobacco: Never Used   Substance Use Topics     Alcohol use: Not Currently      Wt Readings from Last 1  Encounters:   10/06/22 67.2 kg (148 lb 1.6 oz)        Anesthesia Evaluation   Pt has had prior anesthetic.         ROS/MED HX  ENT/Pulmonary:     (+) COPD,     Neurologic:       Cardiovascular:     (+) hypertension-----CHF     METS/Exercise Tolerance:     Hematologic:       Musculoskeletal:       GI/Hepatic:     (+) hepatitis liver disease,     Renal/Genitourinary:     (+) renal disease,     Endo:     (+) type I DM,     Psychiatric/Substance Use:       Infectious Disease:       Malignancy:       Other:            Physical Exam    Airway        Mallampati: II   TM distance: > 3 FB   Neck ROM: full   Mouth opening: > 3 cm    Respiratory Devices and Support         Dental           Cardiovascular             Pulmonary                   OUTSIDE LABS:  CBC:   Lab Results   Component Value Date    WBC 6.7 10/07/2022    WBC 6.8 10/07/2022    HGB 9.5 (L) 10/07/2022    HGB 10.2 (L) 10/07/2022    HCT 30.6 (L) 10/07/2022    HCT 33.3 (L) 10/07/2022     10/07/2022     10/07/2022     BMP:   Lab Results   Component Value Date     (L) 10/07/2022     10/07/2022    POTASSIUM 4.3 10/07/2022    POTASSIUM 3.9 10/07/2022    CHLORIDE 96 (L) 10/07/2022    CHLORIDE 95 (L) 10/07/2022    CO2 28 10/07/2022    CO2 31 (H) 10/07/2022    BUN 35.3 (H) 10/07/2022    BUN 34.4 (H) 10/07/2022    CR 2.48 (H) 10/07/2022    CR 2.40 (H) 10/07/2022     (H) 10/07/2022     (H) 10/07/2022     COAGS:   Lab Results   Component Value Date    PTT 25 08/12/2022    INR 0.92 10/07/2022    FIBR 192 06/29/2022     POC: No results found for: BGM, HCG, HCGS  HEPATIC:   Lab Results   Component Value Date    ALBUMIN 3.4 (L) 10/07/2022    PROTTOTAL 5.8 (L) 10/07/2022    ALT 6 (L) 10/07/2022    AST 19 10/07/2022    ALKPHOS 76 10/07/2022    BILITOTAL 0.4 10/07/2022    LAYLA 24 09/19/2022     OTHER:   Lab Results   Component Value Date    PH 7.39 09/19/2022    LACT 1.4 09/20/2022    A1C 5.8 (H) 06/28/2022    ESTUARDO 9.8 10/07/2022    PHOS  3.2 09/28/2022    MAG 2.0 10/02/2022    LIPASE 13 10/07/2022    AMYLASE 42 10/07/2022    TSH 1.49 07/18/2022    CRP 4.92 09/20/2022       Anesthesia Plan    ASA Status:  3      Anesthesia Type: General.     - Airway: ETT   Induction: Intravenous.   Maintenance: Inhalation.   Techniques and Equipment:     - Lines/Monitors: 2nd IV     Consents    Anesthesia Plan(s) and associated risks, benefits, and realistic alternatives discussed. Questions answered and patient/representative(s) expressed understanding.     - Discussed: Risks, Benefits and Alternatives for BOTH SEDATION and the PROCEDURE were discussed     - Discussed with:  Patient         Postoperative Care    Pain management: IV analgesics.   PONV prophylaxis: Ondansetron (or other 5HT-3), Dexamethasone or Solumedrol     Comments:                Josefa Silvestre MD

## 2022-10-07 NOTE — PROGRESS NOTES
Pulmonary Medicine  Cystic Fibrosis - Lung Transplant Team  Progress Note  10/07/2022     Patient: Sofie Rodriguez  MRN: [id number]  : 1960 (age 62 year old)  Transplant: 2022 (Lung), POD#101  Admission date: 2022    Assessment & Plan:     Sofie Rodriguez is a 62-year-old female s/p BSLT (22) for COPD complicated by persistent bilateral pleural effusions, persistent CO2 retention, right hemidiaphragm palsy, BACILIO (dialysis -), C. diff colitis, gastroparesis s/p GJ tube placement (22), GI bleed 2/2 pyloric ulcer, hemobilia s/p ERCP and MRCP (), and persistent vasoplegia.  Other history notable for HFpEF, NTM colonoization, hepatitis C, and methamphetamine use.  Patient admitted 22 with persistent dyspnea (increased with activity), daily morning hemoptysis, and increased BLE edema.  Also noted to have persistent, increased bilateral pleural effusions, diffuse bilateral groundglass changes, and more focal appearing LLL infiltrates on imaging.  Treating with aggressive diuresis with some improvement in symptoms and hypoxia.  Hemoptysis resolved, mild intermittent hypoxia and ANAYA persisted.  S/p bilateral chest tubes.  Episode of AMS now resolved.  S/p tunneled line placed and iHD initiation ().  Anticipate discharge over the weekend with OP dialysis next Tuesday.      Today's recommendations:  - Exercise oximetry study ordered, complete prior to discharge  - if unable to tolerate cycled tube feeds, patient is comfortable doing continuous  - Pulmonary follow up scheduled 10/12  - Tacrolimus repeat level as OP  - Prednisone taper due 10/13 (not yet ordered)  - CMV monitoring every other week, due 10/19 (not yet ordered)  - EBV (10/5) stable log 2.9  - DSA 10/5 with stable DQB2 at 5399  - IgG level due 10/29 (not yet ordered)   - G tube to gravity drainage overnight and prn during the day with GI symptoms (N/V, bloating, reflux)  - ERCP performed today with removal of stents  and sludge.  Unclear if EGD performed per plan.     S/p bilateral sequential lung transplant (BSLT) for end stage COPD:  Acute hypoxia with chronic hypercapnia:   Pulmonary edema:  Persistent bibasilar pleural effusions:   Right hemidiaphragm palsy: Admitted with increased dyspnea with minimal exertion and small volume daily hemoptysis.  Also with marked decline in PFTs (FEV1 1.22L, 50% on 8/18 to 0.98L, 40% on 9/7).  Chest CT (9/8) with increased effusions and groundglass changes.  DSA positive but stable (as below).  BNP markedly elevated (30k) and also with increased BLE edema.  Etiology unclear and is likely multi-factorial with DDx including pulmonary edema from hypervolemia/progressive HFpEF, rejection (ACR +/- AMR), alveolar hemorrhage, and/or infection.  Aggressively diuresed with some improvement in symptoms.  Bronch (9/13) unremarkable, BAL of lingula sent, cultures no growth (GPC on gram stain only).  S/p right pigtail chest tube placement (9/13-9/22), transudative with moderate output initially but quickly decreasing, cultures negative.  S/p aspiration of left pleural effusion (9/13) and then pigtail chest tube (9/15-9/29) s/p full lytic course (916-9/19), cultures also negative, clamped 9/26.  Chest CT (9/27) with grossly unchanged small bilateral hydroPTX with left chest tube coiled in inferior medial left pleural space, continued resolution of nodular opacity in posterior MARLON, and the lobes of the left upper and lower are rotated with respect to each other (discussed at transplant conference 9/29, no intervention).  Weaned to RA at rest on 9/24, using 1L NC overnight.  VBG with improved hypercapnia as of 9/29.  Overnight oximetry (10/3) incomplete given need for 1L NC with desaturation.  CXR (10/5) with with continued loculation of fluid in right major fissure with fluid collecting in minor fissure, left sided pleural effusion tracking along wall with apical thickening, and bilateral diffuse  interstitial/airspace opacities.    - Supplemental O2 to keep >92%, 1L NC overnight, exercise oximetry study (ordered), complete prior to discharge  - Continue to defer NIPPV, repeat VBG prn with change in mentation/clinical status  - IS and Aerobika q1h w/a and encourage OOB during the day as much as able  - Pulmonary follow up scheduled 10/12     Immunosuppression:  - Tacrolimus 4 mg BID (decreased 10/3).  Goal level 8-12. Repeat level as OP.  - Azathioprine 100 mg daily (9/20, MMF stopped due to ongoing diarrhea)  - Prednisone 10 mg qAM / 7.5 mg qPM with next taper due 10/13 (not yet ordered)  Date AM dose (mg) PM dose (mg)   9/15/22 10 7.5   10/13/22 7.5 7.5   11/10/22 7.5 5   12/8/22 5 5   1/5/23 5 2.5      Prophylaxis:   - Dapsone qMWF for PJP ppx (resumed 9/19, monitor for worsening anemia; Bactrim stopped d/t hyperkalemia, will need to monitor for recurrence of anemia with dapsone; Pentamidine neb not tolerated on 9/7 after only 5 minutes d/t excessive coughing)  - Completed VGCV for CMV ppx 9/26 (through POD#90), D+/R+, CMV monitoring every other week (due 10/19, not yet ordered)     Positive DSA: Newly positive on 8/10 with DQB2 mfi 2155.   - Monitoring [id number], (10/5) pending, see below for trend:  Date DQB2 Notes   8/10 2155     9/1 7033 Current peak   9/21 5390 Most recent    10/5 5399                 EBV viremia: Low level (1374 on 9/7), not likely to be clinically significant.  - Follow EBV monthly, (10/5) log 2.9 (stable)     Hypogammaglobulinemia: IgG adequate at time of transplant, repeat level low (336) on 7/28.  S/p IVIG 7/30, tolerated well.  IgG on 9/29 low at 559 but not indicating IVIG replacement.   - Follow IgG monthly (next due 10/29, not yet ordered)     Other relevant problems being managed by the primary team:     Encephalopathy, Resolved:  Tremors:   Presumed UTI: Noted 9/19 with confusion as well as tremor.  DDx including hypercapnia, hyperammonia, infection, uremia (see  below), medication related.  VBG with worsening hypercapnia (99).  BUN elevated (112).  Ammonia normal (24).  CRP normal, procal 0.1.  UA with moderate blood, large leukocyte esterase, but UC (9/19) negative.  Blood culture (9/19) negative.  Tacro not supratherapeutic.  Head CT (9/20) without acute intracranial pathology.  AMS resolved ~9/22.  S/p ceftriaxone 5-day course (9/20-9/24).     ESRD based upon Cystatin C (GFR of 10): Nephrology consulted 9/21, see their note for details, with concern for CKD5 as Cr may suggest overestimation of kidney function with limited muscle mass, unclear if trend over the past week reflects BACILIO.  Cystatin C 4.3 with GFR 10.  Making urine.  Renal US (9/22) normal.  S/p tunneled line placed and HD initiated 9/26.  Management per nephrology.     Diarrhea: C diff negative on 9/10 and 9/20.  Likely medication related (MMF), but unable to transition to Myfortic given NPO.  Loperamide utilized with some benefit.   Enteric stool panel negative 9/16.  Transitioned from MMF to AZA as above.   - Goal to titrate to antidiarrheal 3 stools daily (without urgency), management per primary     Severe gastroparesis:   Pyloric ulcer: Gastric emptying study 7/20 with severe gastric emptying delay (95% retention at 4 hours), s/p GJ tube placement in IR 7/27.  S/p EGD (8/3) noted to have pyloric ulcer and excessive gastric fluid and residual food.  S/p EGD (8/11) with mild erythema 2/2 GJ tube trauma seen near insertion site but no active bleeding.  Repeat gastric emptying study 9/30 unfortunately with persistent severe retention (91% at 4h) and some fullness and nausea post study; defer adjustments to current plan and consider semi-permanent status as is.  Increased bloating 10/5, AXR with nonobstructive bowel gas pattern, mild gaseous distention of bowel loops in the RUQ.  Symptoms improving 10/6.  - PPI BID, limit opioids  - TF continuous and ALL enteral medications via J tube  - G tube to gravity  "drainage overnight and prn during the day with GI symptoms (N/V, bloating, reflux); full liquid diet for comfort only  - EGD not documented and ERCP 10/7 with removal of stents and sludge     We appreciate the excellent care provided by the [place] [id number] team.  Recommendations communicated via in person rounding and this note.  Will continue to follow along closely, please do not hesitate to call with any questions or concerns.     Monik Paredes MD MPH  Associate Professor of Medicine  Pager [phone]     Subjective & Interval History:     Patient without further abdominal pain.  Felt better after venting J tube.  Denies shortness of breath, cough or chest pain.  Had anesthesia this am for ERCP, so needing some oxygen during interview.    Review of Systems:     C: No fever, no chills, no change in weight, NPO  INTEGUMENTARY/SKIN: No rash or obvious new lesions  ENT/MOUTH: No sore throat, no sinus pain, no nasal congestion or drainage  RESP: See interval history  CV: No chest pain, no palpitations, no peripheral edema  GI: No nausea, no vomiting, no change in stools, no reflux symptoms  : Negative  MUSCULOSKELETAL: No myalgias, no arthralgias  NEURO: No headache  PSYCHIATRIC: Mood stable    Physical Exam:     All notes, images, and labs from past 24 hours (at minimum) were reviewed.    Vital signs:  Temp: 99.3  F (37.4  C) Temp src: Oral BP: 127/76 Pulse: 84   Resp: 18 SpO2: 99 % O2 Device: None (Room air) Oxygen Delivery: 1 LPM Height: 158.8 cm (5' 2.5\") Weight: 67.2 kg (148 lb 1.6 oz)  I/O:     Intake/Output Summary (Last 24 hours) at 10/7/2022 1443  Last data filed at 10/7/2022 0922  Gross per 24 hour   Intake 1090 ml   Output 250 ml   Net 840 ml     Constitutional: Sitting in bed, in no apparent distress.   HEENT: Eyes with pink conjunctivae, anicteric.  Oral mucosa moist without lesions.  Neck supple without lymphadenopathy.   PULM: Good air flow bilaterally.  No crackles, no rhonchi, no wheezes. "   CV: Normal S1 and S2.  RRR.  No murmur, gallop, or rub.  + peripheral edema.   ABD: NABS, soft, nontender, nondistended.    MSK: Moves all extremities.  No apparent muscle wasting.   NEURO: Alert and oriented, conversant.   SKIN: Warm, dry.  No rash on limited exam.   PSYCH: Mood stable.     Lines, Drains, and Devices:  Peripheral IV 09/10/22 Anterior;Left Lower forearm (Active)   Site Assessment [facility] 10/07/22 1000   Line Status Saline locked 10/07/22 1000   Dressing Intervention New dressing  09/10/22 0135   Phlebitis Scale 0-->no symptoms 10/06/22 2000   Infiltration Scale 0 10/06/22 2000   Number of days: 27       CVC Double Lumen Right Internal jugular Tunneled (Active)   Site Assessment [facility] 10/07/22 1000   External Cath Length (cm) 1 cm 10/03/22 1115   Dressing Type Chlorhexidine disk 10/03/22 1115   Dressing Status clean;dry;intact 10/06/22 2000   Dressing Intervention new dressing 10/03/22 1115   Dressing Change Due 10/10/22 10/05/22 0812   Line Necessity yes, meets criteria 10/04/22 2100   Blue - Status saline locked 10/06/22 2000   Blue - Cap Change Due 10/12/22 10/05/22 1140   Red - Status saline locked 10/06/22 2000   Red - Cap Change Due 10/12/22 10/05/22 1140   CVC Comment HD line 10/06/22 1410   Number of days: 11     Data:     LABS    CMP:   Recent Labs   Lab 10/07/22  1356 10/07/22  0951 10/07/22  0627 10/07/22  0548 10/06/22  1051 10/06/22  0544 10/05/22  1002 10/05/22  0615 10/02/22  0824 10/02/22  0638   NA  --   --  135* 137  --  132*  --  135*   < > 136   POTASSIUM  --   --  4.3 3.9  --  3.9  --  4.2   < > 4.2   CHLORIDE  --   --  96* 95*  --  93*  --  92*   < > 96*   CO2  --   --  28 31*  --  34*  --  34*   < > 34*   ANIONGAP  --   --  11 11  --  5*  --  9   < > 6*   * 119* 111* 106*   < > 144*  133*   < > 135*   < > 137*   BUN  --   --  35.3* 34.4*  --  27.3*  --  51.7*   < > 41.3*   CR  --   --  2.48* 2.40*  --  2.06*  --  2.48*   < > 2.25*   GFRESTIMATED  --   --  22* 22*  --  27*   --  22*   < > 24*   ESTUARDO  --   --  9.8 10.0  --  9.0  --  9.7   < > 8.9   MAG  --   --   --   --   --   --   --   --   --  2.0   PROTTOTAL  --   --  5.8* 6.5  --  5.4*  --  5.6*   < > 5.0*   ALBUMIN  --   --  3.4* 3.8  --  3.2*  --  3.4*   < > 3.0*   BILITOTAL  --   --  0.4 0.4  --  0.3  --  0.2   < > 0.2   ALKPHOS  --   --  76 88  --  92  --  91   < > 86   AST  --   --  19 20  --  21  --  18   < > 18   ALT  --   --  6* 15  --  11  --  7*   < > 12    < > = values in this interval not displayed.     CBC:   Recent Labs   Lab 10/07/22  0627 10/07/22  0548 10/06/22  0811 10/02/22  0638   WBC 6.7 6.8 7.9 7.8   RBC 2.89* 3.10* 2.80* 2.57*   HGB 9.5* 10.2* 9.3* 8.4*   HCT 30.6* 33.3* 29.8* 26.6*   * 107* 106* 104*   MCH 32.9 32.9 33.2* 32.7   MCHC 31.0* 30.6* 31.2* 31.6   RDW 19.9* 20.0* 19.8* 20.1*    255 214 285       INR:   Recent Labs   Lab 10/07/22  0627   INR 0.92       Glucose:   Recent Labs   Lab 10/07/22  1356 10/07/22  0951 10/07/22  0627 10/07/22  0548 10/07/22  0542 10/06/22  2120   * 119* 111* 106* 117* 154*       Blood Gas:   Recent Labs   Lab 10/03/22  0547   PHV 7.37   PCO2V 64*   PO2V 71*   HCO3V 37*   LINDY 10.2*   O2PER 20       Culture Data No results for input(s): CULT in the last 168 hours.    Virology Data:   Lab Results   Component Value Date    FLUAH1 Not Detected 09/13/2022    FLUAH3 Not Detected 09/13/2022    WS7811 Not Detected 09/13/2022    IFLUB Not Detected 09/13/2022    RSVA Not Detected 09/13/2022    RSVB Not Detected 09/13/2022    PIV1 Not Detected 09/13/2022    PIV2 Not Detected 09/13/2022    PIV3 Not Detected 09/13/2022    HMPV Not Detected 09/13/2022       Historical CMV results (last 3 of prior testing):  Lab Results   Component Value Date    CMVQNT Not Detected 10/05/2022    CMVQNT Not Detected 09/30/2022    CMVQNT Not Detected 09/13/2022     No results found for: CMVLOG    Urine Studies    Recent Labs   Lab Test 09/19/22  2254 08/01/22  0319 07/08/22  0831   URINEPH  7.0 8.0* 5.5   NITRITE Negative Negative Negative   LEUKEST Large* Negative Negative   WBCU 29*  --  1       Most Recent [place] Pulmonary Function Testing (FVC/FEV1 only)  FVC-Pre   Date Value Ref Range Status   09/07/2022 1.01 L    09/01/2022 1.07 L    08/24/2022 1.23 L    08/18/2022 1.33 L      FVC-%Pred-Pre   Date Value Ref Range Status   09/07/2022 33 %    09/01/2022 35 %    08/24/2022 40 %    08/18/2022 43 %      FEV1-Pre   Date Value Ref Range Status   09/07/2022 0.98 L    09/01/2022 1.02 L    08/24/2022 1.17 L    08/18/2022 1.22 L      FEV1-%Pred-Pre   Date Value Ref Range Status   09/07/2022 40 %    09/01/2022 42 %    08/24/2022 48 %    08/18/2022 50 %        IMAGING    Recent Results (from the past 48 hour(s))   XR Surgery LARISA G/T 5 Min Fluoro w Stills    Narrative    This exam was marked as non-reportable because it will not be read by a   radiologist or a [place] non-radiologist provider.

## 2022-10-07 NOTE — BRIEF OP NOTE
[facility]    Brief Operative Note  Sofie Nichols a 62 year old female with a PMHx of COPD s/p bilateral lung transplant (6/28/22)   complicated by acute limb ischemia of LUE requiring left radial thrombectomy, EBV viremia and C.diff (vancomycin 7/12/22-7/28/22). She was found to have delayed gastric emptying on GES and underwent percutaneous GJ tube placement by IR on 7/27/22?and recent GI bleed suspected to due to clean based ulcer (Anant III) in the antrum noted in recent EGD. She was admitted for melena back in June and found to have hemobilia. She also had elevated LFTs during the same admission and MRCP showed mild dilation of intrahepatic and extrahepatic biliary ducts and gallbladder with sludge extending to the bladder neck. She underwent an ERCP which showed hemobilia. A biliary sphincterotomy was performed and a transpabillary Farrell Viabil [id number] was placed in the common bile duct. A 4 Fr by 11 cm temporary Arroyo pancreatic was placed in the PD. She since had cholecystectomy. LFTs are have been normal since. She presents for an ERCP for stent removal.     Pre-operative diagnosis: Dilated bile duct [K83.8]  Post-operative diagnosis Same as pre-operative diagnosis    Procedure: Procedure(s):  ENDOSCOPIC RETROGRADE CHOLANGIOPANCREATOGRAPHY with biliary and pancreatic stent removal, debris removal  Replace Gastrojejunostomy Tube  Surgeon: Surgeon(s) and Role:     * Cosmo Arroyo MD - Primary     * Kelly Rai MD - Fellow - Assisting  Anesthesia: General   Estimated Blood Loss: None    Drains: None  Specimens: * No specimens in log *  Findings:     - Prior biliary and pancreatic stent were removed with a snare  - Prior biliary sphincterotomy was patent and selective biliary cannulation was performed  - The common bile duct was swept with a balloon with extraction of sludge   - Occlusion cholangiogram showed no residual stones seen   -  PEG-J retracted into the stomach during the ERCP procedure and was successfully replaced after the procedure     Complications: None.  Implants:   Implant Name Type Inv. Item Serial No.  Lot No. LRB No. Used Action   STENT ENDOPROS BILIARY GORE VIABIL W/HL [id number] - [id number] Stent STENT ENDOPROS BILIARY GORE VIABIL W/HL [id number] [employer]  N/A 1 Explanted   STENT FINE PANCREA FLEX [id number] W/O FLANGE SGL PIGTAIL - [id number] Stent STENT FINE PANCREA FLEX [id number] W/O FLANGE SGL PIGTAIL  [place] S99- N/A 1 Explanted     Recommendations  - [facility] to [facility] for [facility] care  - No need for further follow up with the GI team

## 2022-10-07 NOTE — PLAN OF CARE
Temp: 98.5  F (36.9  C) Temp src: Oral BP: 126/71 Pulse: 94   Resp: 18 SpO2: 96 % O2 Device: None (Room air) Oxygen Delivery: 1 LPM     Shift: 1131-0830  D: Admitted 9/9 for increased ANAYA and daily hemoptysis to work up antibody medicated rejection versus infection. PMH of BSLT (6/22), pleural effusions, & severe gastroparesis s/p GJ tube placement 7/27.      I: Monitored vitals and assessed pt status.   Changed: NPO @ midnight  Running: Cycled TF (4pm-10am) @ 55 mL/hr via J tube, G tube vented to gravity  PRN: Oxycodone 5mg for back pain  Tele: No tele orders  O2: RA, 1L NC at night  Mobility: Independent/SBA    A: A0x4. VSS. Afebrile. Oliguric, LBM 10/6. Back pain present below old CT sites, relief with prn oxycodone. Denies abd discomfort. Pt picked up from transport @ 0600 to go to pre-op for procedure today.        Plan: Continue to monitor and follow POC. EGD and ERCP planned for today 10/7. Notify [facility] 10 with any changes.

## 2022-10-07 NOTE — ANESTHESIA POSTPROCEDURE EVALUATION
Patient: Sofie Rodriguez    Procedure: Procedure(s):  ENDOSCOPIC RETROGRADE CHOLANGIOPANCREATOGRAPHY with biliary and pancreatic stent removal, debris removal  Replace Gastrojejunostomy Tube       Anesthesia Type:  No value filed.    Note:  Disposition: Inpatient   Postop Pain Control: Uneventful            Sign Out: Well controlled pain   PONV: No   Neuro/Psych: Uneventful            Sign Out: Acceptable/Baseline neuro status   Airway/Respiratory: Uneventful            Sign Out: Acceptable/Baseline resp. status   CV/Hemodynamics: Uneventful            Sign Out: Acceptable CV status; No obvious hypovolemia; No obvious fluid overload   Other NRE: NONE   DID A NON-ROUTINE EVENT OCCUR? No           Last vitals:  Vitals Value Taken Time   /66 10/07/22 1000   Temp 36.6  C (97.9  F) 10/07/22 1000   Pulse 91 10/07/22 1005   Resp 18 10/07/22 1000   SpO2 93 % 10/07/22 1010   Vitals shown include unvalidated device data.    Electronically Signed By: Nathen Costa MD  October 7, 2022  10:23 AM

## 2022-10-08 ENCOUNTER — HOME INFUSION (PRE-WILLOW HOME INFUSION) (OUTPATIENT)
Dept: PHARMACY | Facility: CLINIC | Age: 62
End: 2022-10-08

## 2022-10-08 VITALS
OXYGEN SATURATION: 97 % | BODY MASS INDEX: 26.58 KG/M2 | DIASTOLIC BLOOD PRESSURE: 75 MMHG | SYSTOLIC BLOOD PRESSURE: 120 MMHG | TEMPERATURE: 98.7 F | HEART RATE: 91 BPM | RESPIRATION RATE: 16 BRPM | WEIGHT: 150 LBS | HEIGHT: 63 IN

## 2022-10-08 LAB
ALBUMIN SERPL BCG-MCNC: 3.1 G/DL (ref 3.5–5.2)
ALP SERPL-CCNC: 78 U/L (ref 35–104)
ALT SERPL W P-5'-P-CCNC: 10 U/L (ref 10–35)
ANION GAP SERPL CALCULATED.3IONS-SCNC: 10 MMOL/L (ref 7–15)
AST SERPL W P-5'-P-CCNC: 21 U/L (ref 10–35)
BILIRUB SERPL-MCNC: 0.2 MG/DL
BUN SERPL-MCNC: 56.9 MG/DL (ref 8–23)
CALCIUM SERPL-MCNC: 9.6 MG/DL (ref 8.8–10.2)
CHLORIDE SERPL-SCNC: 93 MMOL/L (ref 98–107)
CREAT SERPL-MCNC: 3.22 MG/DL (ref 0.51–0.95)
DEPRECATED HCO3 PLAS-SCNC: 32 MMOL/L (ref 22–29)
ERYTHROCYTE [DISTWIDTH] IN BLOOD BY AUTOMATED COUNT: 19.9 % (ref 10–15)
GFR SERPL CREATININE-BSD FRML MDRD: 16 ML/MIN/1.73M2
GLUCOSE BLDC GLUCOMTR-MCNC: 162 MG/DL (ref 70–99)
GLUCOSE BLDC GLUCOMTR-MCNC: 173 MG/DL (ref 70–99)
GLUCOSE SERPL-MCNC: 106 MG/DL (ref 70–99)
HCT VFR BLD AUTO: 30 % (ref 35–47)
HGB BLD-MCNC: 9.3 G/DL (ref 11.7–15.7)
MCH RBC QN AUTO: 32.9 PG (ref 26.5–33)
MCHC RBC AUTO-ENTMCNC: 31 G/DL (ref 31.5–36.5)
MCV RBC AUTO: 106 FL (ref 78–100)
PLATELET # BLD AUTO: 210 10E3/UL (ref 150–450)
POTASSIUM SERPL-SCNC: 4.6 MMOL/L (ref 3.4–5.3)
PROT SERPL-MCNC: 5.5 G/DL (ref 6.4–8.3)
RBC # BLD AUTO: 2.83 10E6/UL (ref 3.8–5.2)
SODIUM SERPL-SCNC: 135 MMOL/L (ref 136–145)
WBC # BLD AUTO: 7.2 10E3/UL (ref 4–11)

## 2022-10-08 PROCEDURE — 99233 SBSQ HOSP IP/OBS HIGH 50: CPT | Performed by: NURSE PRACTITIONER

## 2022-10-08 PROCEDURE — 99239 HOSP IP/OBS DSCHRG MGMT >30: CPT | Performed by: INTERNAL MEDICINE

## 2022-10-08 PROCEDURE — 90937 HEMODIALYSIS REPEATED EVAL: CPT

## 2022-10-08 PROCEDURE — 85027 COMPLETE CBC AUTOMATED: CPT | Performed by: INTERNAL MEDICINE

## 2022-10-08 PROCEDURE — 250N000013 HC RX MED GY IP 250 OP 250 PS 637: Performed by: NURSE PRACTITIONER

## 2022-10-08 PROCEDURE — 250N000013 HC RX MED GY IP 250 OP 250 PS 637: Performed by: INTERNAL MEDICINE

## 2022-10-08 PROCEDURE — 250N000009 HC RX 250: Performed by: INTERNAL MEDICINE

## 2022-10-08 PROCEDURE — 250N000012 HC RX MED GY IP 250 OP 636 PS 637: Performed by: PHYSICIAN ASSISTANT

## 2022-10-08 PROCEDURE — 634N000001 HC RX 634: Performed by: INTERNAL MEDICINE

## 2022-10-08 PROCEDURE — 258N000003 HC RX IP 258 OP 636: Performed by: INTERNAL MEDICINE

## 2022-10-08 PROCEDURE — 36415 COLL VENOUS BLD VENIPUNCTURE: CPT | Performed by: INTERNAL MEDICINE

## 2022-10-08 PROCEDURE — 250N000013 HC RX MED GY IP 250 OP 250 PS 637: Performed by: STUDENT IN AN ORGANIZED HEALTH CARE EDUCATION/TRAINING PROGRAM

## 2022-10-08 PROCEDURE — 250N000011 HC RX IP 250 OP 636: Performed by: STUDENT IN AN ORGANIZED HEALTH CARE EDUCATION/TRAINING PROGRAM

## 2022-10-08 PROCEDURE — 90935 HEMODIALYSIS ONE EVALUATION: CPT | Performed by: INTERNAL MEDICINE

## 2022-10-08 PROCEDURE — 250N000012 HC RX MED GY IP 250 OP 636 PS 637: Performed by: NURSE PRACTITIONER

## 2022-10-08 PROCEDURE — 250N000013 HC RX MED GY IP 250 OP 250 PS 637

## 2022-10-08 PROCEDURE — 80053 COMPREHEN METABOLIC PANEL: CPT

## 2022-10-08 RX ORDER — GABAPENTIN 250 MG/5ML
100 SOLUTION ORAL AT BEDTIME
Qty: 60 ML | Refills: 11 | Status: SHIPPED | OUTPATIENT
Start: 2022-10-08 | End: 2022-10-08

## 2022-10-08 RX ORDER — PREDNISONE 5 MG/1
TABLET ORAL
Qty: 120 TABLET | Refills: 3
Start: 2022-10-08 | End: 2023-01-02 | Stop reason: DRUGHIGH

## 2022-10-08 RX ORDER — LOPERAMIDE HYDROCHLORIDE 2 MG/1
2 TABLET ORAL 4 TIMES DAILY PRN
COMMUNITY
Start: 2022-10-08

## 2022-10-08 RX ORDER — FOLIC ACID 1 MG/1
1 TABLET ORAL DAILY
Qty: 30 TABLET | Refills: 11 | Status: SHIPPED | OUTPATIENT
Start: 2022-10-08 | End: 2023-02-28

## 2022-10-08 RX ORDER — B COMPLEX C NO.10/FOLIC ACID 900MCG/5ML
5 LIQUID (ML) ORAL DAILY
Qty: 237 ML | Refills: 11 | Status: SHIPPED | OUTPATIENT
Start: 2022-10-08 | End: 2022-11-02

## 2022-10-08 RX ORDER — CARBOXYMETHYLCELLULOSE SODIUM 5 MG/ML
1 SOLUTION/ DROPS OPHTHALMIC 4 TIMES DAILY PRN
Qty: 50 EACH | Refills: 3 | Status: SHIPPED | OUTPATIENT
Start: 2022-10-08 | End: 2022-10-12

## 2022-10-08 RX ORDER — FOLIC ACID 1 MG/1
1 TABLET ORAL DAILY
Qty: 30 TABLET | Refills: 11 | Status: SHIPPED | OUTPATIENT
Start: 2022-10-08 | End: 2022-10-08

## 2022-10-08 RX ORDER — TRAZODONE HYDROCHLORIDE 50 MG/1
25 TABLET, FILM COATED ORAL
Qty: 15 TABLET | Refills: 11 | Status: SHIPPED | OUTPATIENT
Start: 2022-10-08 | End: 2022-10-26

## 2022-10-08 RX ORDER — B COMPLEX C NO.10/FOLIC ACID 900MCG/5ML
5 LIQUID (ML) ORAL DAILY
Qty: 237 ML | Refills: 11 | Status: SHIPPED | OUTPATIENT
Start: 2022-10-08 | End: 2022-10-08

## 2022-10-08 RX ORDER — GABAPENTIN 250 MG/5ML
100 SOLUTION ORAL AT BEDTIME
Qty: 60 ML | Refills: 11 | Status: SHIPPED | OUTPATIENT
Start: 2022-10-08 | End: 2022-10-26

## 2022-10-08 RX ORDER — METOPROLOL TARTRATE 50 MG
25 TABLET ORAL 2 TIMES DAILY
Qty: 60 TABLET | Refills: 11 | COMMUNITY
Start: 2022-10-08 | End: 2022-11-25

## 2022-10-08 RX ORDER — GUAR GUM
1 PACKET (EA) ORAL 2 TIMES DAILY
Qty: 75 EACH | Refills: 3 | Status: SHIPPED | OUTPATIENT
Start: 2022-10-08 | End: 2022-10-17

## 2022-10-08 RX ORDER — ACETAMINOPHEN 160 MG/5ML
1000 LIQUID ORAL 2 TIMES DAILY
Qty: 1200 ML | Refills: 3 | COMMUNITY
Start: 2022-10-08 | End: 2022-10-12

## 2022-10-08 RX ADMIN — TACROLIMUS 4 MG: 5 CAPSULE ORAL at 11:52

## 2022-10-08 RX ADMIN — ORAL VEHICLES - SUSP 12.5 MG: SUSPENSION at 13:32

## 2022-10-08 RX ADMIN — Medication 5 ML: at 13:32

## 2022-10-08 RX ADMIN — SODIUM CHLORIDE 250 ML: 9 INJECTION, SOLUTION INTRAVENOUS at 08:30

## 2022-10-08 RX ADMIN — INSULIN ASPART 1 UNITS: 100 INJECTION, SOLUTION INTRAVENOUS; SUBCUTANEOUS at 11:51

## 2022-10-08 RX ADMIN — SODIUM CHLORIDE 300 ML: 9 INJECTION, SOLUTION INTRAVENOUS at 08:30

## 2022-10-08 RX ADMIN — CALCIUM CARBONATE 600 MG (1,500 MG)-VITAMIN D3 400 UNIT TABLET 1 TABLET: at 13:32

## 2022-10-08 RX ADMIN — NYSTATIN 1000000 UNITS: 100000 SUSPENSION ORAL at 13:31

## 2022-10-08 RX ADMIN — LOPERAMIDE HCL 2 MG: 1 SOLUTION ORAL at 06:55

## 2022-10-08 RX ADMIN — EPOETIN ALFA-EPBX 5000 UNITS: 10000 INJECTION, SOLUTION INTRAVENOUS; SUBCUTANEOUS at 11:10

## 2022-10-08 RX ADMIN — HEPARIN SODIUM 5000 UNITS: 5000 INJECTION, SOLUTION INTRAVENOUS; SUBCUTANEOUS at 13:32

## 2022-10-08 RX ADMIN — INSULIN ASPART 1 UNITS: 100 INJECTION, SOLUTION INTRAVENOUS; SUBCUTANEOUS at 03:14

## 2022-10-08 RX ADMIN — Medication 100 MG: at 13:32

## 2022-10-08 RX ADMIN — CYANOCOBALAMIN TAB 500 MCG 500 MCG: 500 TAB at 13:32

## 2022-10-08 RX ADMIN — PREDNISONE 10 MG: 5 SOLUTION ORAL at 11:52

## 2022-10-08 RX ADMIN — Medication 1 PACKET: at 13:53

## 2022-10-08 RX ADMIN — Medication: at 08:30

## 2022-10-08 RX ADMIN — OXYCODONE HYDROCHLORIDE 5 MG: 5 SOLUTION ORAL at 04:40

## 2022-10-08 ASSESSMENT — ACTIVITIES OF DAILY LIVING (ADL)
ADLS_ACUITY_SCORE: 33
ADLS_ACUITY_SCORE: 29
ADLS_ACUITY_SCORE: 31
ADLS_ACUITY_SCORE: 29
ADLS_ACUITY_SCORE: 29
ADLS_ACUITY_SCORE: 31
ADLS_ACUITY_SCORE: 30
ADLS_ACUITY_SCORE: 31

## 2022-10-08 NOTE — PLAN OF CARE
62 year old female admitted 9/9 for increased ANAYA and daily hemoptysis to work up antibody medicated rejection versus infection. PMH of BSLT (6/22), pleural effusions, & severe gastroparesis s/p GJ tube placement 7/27    Neuro: AOx4, denies headache, lightheadedness and dizziness. No reports of numbness/tingling.  Resp: RA during day, 1L NC overnight sating >95%. Denies SOB, LS clear and diminished in bases.   Cardiac: Not on tele, HR 80s-90s. Denies chest pain and palpitations. VSS on RA   GI/: Clear thin liquids with cycled TF running at 55mL/hr through J, TF turned off at 0600 with pt confirmation. Denies N/V, oliguric on HD with HD run 10/8 prior to discharge. LBM 10/7 - loose watery stool, given Imodium via PEG tube per pt request. G to gravity with adequate drainage. BG's elevated - 224 (2U novolog) and 173 (1U novolog).   Skin: Generalized bruising, otherwise no overt deficits noted.  Pain: Received 5mg oxy for general back pain prior to bed.  Activity: up ad magalie  LDAs: PEG tube running TF 55mL/hr, L PIV SL and WDL, R HD CVC WDL.    Plans for discharge tomorrow after hemodialysis, will continue to monitor and report changes to team.

## 2022-10-08 NOTE — PROGRESS NOTES
Pulmonary Medicine  Cystic Fibrosis - Lung Transplant Team  Progress Note  10/08/2022       Patient: Sofie Rodriguez  MRN: [id number]  : 1960 (age 62 year old)  Transplant: 2022 (Lung), POD#102  Admission date: 2022    Assessment & Plan:     Sofie Rodriguez is a 62-year-old female s/p BSLT (22) for COPD complicated by persistent bilateral pleural effusions, persistent CO2 retention, right hemidiaphragm palsy, BACILIO (dialysis -), C. diff colitis, gastroparesis s/p GJ tube placement (22), GI bleed 2/2 pyloric ulcer, hemobilia s/p ERCP and MRCP (), and persistent vasoplegia.  Other history notable for HFpEF, NTM colonoization, hepatitis C, and methamphetamine use.  Patient admitted 22 with persistent dyspnea (increased with activity), daily morning hemoptysis, and increased BLE edema.  Also noted to have persistent, increased bilateral pleural effusions, diffuse bilateral groundglass changes, and more focal appearing LLL infiltrates on imaging.  Treating with aggressive diuresis with some improvement in symptoms and hypoxia.  Hemoptysis resolved, mild intermittent hypoxia and ANAYA persisted.  S/p bilateral chest tubes.  Episode of AMS now resolved.  S/p tunneled line placed and iHD initiation ().  Discharge today to local housing after iHD run.      Discharge recommendations:  - Pulmonary f/u 10/12 with CXR, PFTs, and labs (CBC, BMP, mag, tacro)  - Next prednisone taper due 10/13  - CMV and DSA monitoring every other week, next due 10/19  - EBV and IgG monthly, next due   - TF cycled (10/7, could transition back to continuous if not tolerating) and ALL enteral medications via J tube  - G tube to gravity drainage overnight and prn during the day with GI symptoms (N/V, bloating, reflux); full liquid diet for comfort only     S/p bilateral sequential lung transplant (BSLT) for end stage COPD:  Acute hypoxia with chronic hypercapnia:   Pulmonary edema:  Persistent bibasilar  pleural effusions:   Right hemidiaphragm palsy: Admitted with increased dyspnea with minimal exertion and small volume daily hemoptysis.  Also with marked decline in PFTs (FEV1 1.22L, 50% on 8/18 to 0.98L, 40% on 9/7).  Chest CT (9/8) with increased effusions and groundglass changes.  DSA positive but stable (as below).  BNP markedly elevated (30k) and also with increased BLE edema.  Etiology unclear and is likely multi-factorial with DDx including pulmonary edema from hypervolemia/progressive HFpEF, rejection (ACR +/- AMR), alveolar hemorrhage, and/or infection.  Aggressively diuresed with some improvement in symptoms.  Bronch (9/13) unremarkable, BAL of lingula sent, cultures no growth (GPC on gram stain only).  S/p right pigtail chest tube placement (9/13-9/22), transudative with moderate output initially but quickly decreasing, cultures negative.  S/p aspiration of left pleural effusion (9/13) and then pigtail chest tube (9/15-9/29) s/p full lytic course (916-9/19), cultures also negative, clamped 9/26.  Chest CT (9/27) with grossly unchanged small bilateral hydroPTX with left chest tube coiled in inferior medial left pleural space, continued resolution of nodular opacity in posterior MARLON, and the lobes of the left upper and lower are rotated with respect to each other (discussed at transplant conference 9/29, no intervention).  Weaned to RA at rest on 9/24, using 1L NC overnight.  VBG with improved hypercapnia as of 9/29.  Overnight oximetry (10/3) incomplete given need for 1L NC with desaturation (unfortunately not reordered prior to discharge).  CXR (10/5) with with continued loculation of fluid in right major fissure with fluid collecting in minor fissure, left sided pleural effusion tracking along wall with apical thickening, and bilateral diffuse interstitial/airspace opacities.    - Supplemental O2 to keep >92%, 1L NC overnight  - Pulmonary follow up scheduled 10/12     Immunosuppression:  -  Tacrolimus 4 mg BID (decreased 10/3).  Goal level 8-12.  Repeat level as OP.  - Azathioprine 100 mg daily (9/20, MMF stopped due to ongoing diarrhea)  - Prednisone 10 mg qAM / 7.5 mg qPM with next taper due 10/13  Date AM dose (mg) PM dose (mg)   9/15/22 10 7.5   10/13/22 7.5 7.5   11/10/22 7.5 5   12/8/22 5 5   1/5/23 5 2.5      Prophylaxis:   - Dapsone qMWF for PJP ppx (resumed 9/19, monitor for worsening anemia; Bactrim stopped d/t hyperkalemia, will need to monitor for recurrence of anemia with dapsone; Pentamidine neb not tolerated on 9/7 after only 5 minutes d/t excessive coughing)  - Completed VGCV for CMV ppx 9/26 (through POD#90), D+/R+, CMV monitoring every other week (due 10/19)     Positive DSA: Newly positive on 8/10 with DQB2 mfi 2155.   - Monitoring l9ynxhe, next due 10/19, see below for trend:  Date DQB2 Notes   8/10 2155     9/1 7033 Current peak   9/21 5390 Most recent    10/5 5399        EBV viremia: Low level (1374 on 9/7), not likely to be clinically significant.  - Follow EBV monthly, next due 11/5     Hypogammaglobulinemia: IgG adequate at time of transplant, repeat level low (336) on 7/28.  S/p IVIG 7/30, tolerated well.  IgG on 9/29 low at 559 but not indicating IVIG replacement.   - Follow IgG monthly, next due 11/5     Other relevant problems being managed by the primary team:     Encephalopathy, Resolved:  Tremors:   Presumed UTI: Noted 9/19 with confusion as well as tremor.  DDx including hypercapnia, hyperammonia, infection, uremia (see below), medication related.  VBG with worsening hypercapnia (99).  BUN elevated (112).  Ammonia normal (24).  CRP normal, procal 0.1.  UA with moderate blood, large leukocyte esterase, but UC (9/19) negative.  Blood culture (9/19) negative.  Tacro not supratherapeutic.  Head CT (9/20) without acute intracranial pathology.  AMS resolved ~9/22.  S/p ceftriaxone 5-day course (9/20-9/24).     ESRD based upon Cystatin C (GFR of 10): Nephrology consulted 9/21,  see their note for details, with concern for CKD5 as Cr may suggest overestimation of kidney function with limited muscle mass, unclear if trend over the past week reflects BACILIO.  Cystatin C 4.3 with GFR 10.  Making urine.  Renal US (9/22) normal.  S/p tunneled line placed and HD initiated 9/26.  Management per nephrology, discharge on TTS iHD schedule.     Diarrhea: C diff negative on 9/10 and 9/20.  Likely medication related (MMF), but unable to transition to Myfortic given NPO.  Loperamide utilized with some benefit.   Enteric stool panel negative 9/16.  Transitioned from MMF to AZA as above.  Goal to titrate to antidiarrheal 3 stools daily (without urgency).     Severe gastroparesis:   Pyloric ulcer: Gastric emptying study 7/20 with severe gastric emptying delay (95% retention at 4 hours), s/p GJ tube placement in IR 7/27.  S/p EGD (8/3) noted to have pyloric ulcer and excessive gastric fluid and residual food.  S/p EGD (8/11) with mild erythema 2/2 GJ tube trauma seen near insertion site but no active bleeding.  Repeat gastric emptying study 9/30 unfortunately with persistent severe retention (91% at 4h) and some fullness and nausea post study; defer adjustments to current plan and consider semi-permanent status as is.  Increased bloating 10/5, AXR with nonobstructive bowel gas pattern, mild gaseous distention of bowel loops in the RUQ.  Symptoms improving 10/6.  S/p EGD (may not have been performed as requested, not documented) and ERCP 10/7 with removal of stents and sludge; PEG/J tube replaced at that time.  - PPI BID, limit opioids  - TF cycled (10/7, could transition back to continuous if not tolerating) and ALL enteral medications via J tube  - G tube to gravity drainage overnight and prn during the day with GI symptoms (N/V, bloating, reflux); full liquid diet for comfort only     We appreciate the excellent care provided by the [place] [id number] team.  Recommendations communicated via in person rounding  "and this note.  Will continue to follow along closely, please do not hesitate to call with any questions or concerns.    Patient discussed with Dr. Gong.    Catherine Johnson, DNP, APRN, CNP  Inpatient Nurse Practitioner  Pulmonary CF/Transplant     Subjective & Interval History:     Remains on RA, no new pulmonary symptoms.  TF transitioned to cycled yesterday, initially tolerating well.  Stools managed with antidiarrhea, minimal nausea with G to gravity.    Review of Systems:     C: No fever, no chills, + gradually decreasing weight  INTEGUMENTARY/SKIN: No rash or obvious new lesions  ENT/MOUTH: No sore throat, no sinus pain, no nasal congestion or drainage  RESP: See interval history  CV: No chest pain, no palpitations, no peripheral edema, no orthopnea  GI: See interval history  : No dysuria  MUSCULOSKELETAL: + chronic back pain  ENDOCRINE: Blood sugars intermittently elevated  NEURO: No headache, no numbness or tingling  PSYCHIATRIC: Mood stable    Physical Exam:     All notes, images, and labs from past 24 hours (at minimum) were reviewed.    Vital signs:  Temp: 98.6  F (37  C) Temp src: Oral BP: 122/67 Pulse: 94   Resp: 17 SpO2: 100 % O2 Device: None (Room air) Oxygen Delivery: 1 LPM Height: 158.8 cm (5' 2.5\") Weight: 68 kg (150 lb)  I/O:     Intake/Output Summary (Last 24 hours) at 10/8/2022 0744  Last data filed at 10/8/2022 0600  Gross per 24 hour   Intake 555 ml   Output 825 ml   Net -270 ml     Constitutional: Lying in bed, in no apparent distress.   HEENT: Eyes with pink conjunctivae, anicteric.  Oral mucosa moist without lesions.    PULM: Mildly diminished bases bilaterally.  No crackles, no rhonchi, no wheezes.  Non-labored breathing on RA.  CV: RRR.  No murmur, gallop, or rub.  No peripheral edema.   ABD: NABS, soft, nontender, nondistended.  PEG/J tube not visualized.  MSK: Moves all extremities.  No apparent muscle wasting.   NEURO: Sleeping but arousable.   SKIN: Warm, dry.  No rash on limited exam. "   PSYCH: Mood stable.     Lines, Drains, and Devices:  Peripheral IV 09/10/22 Anterior;Left Lower forearm (Active)   Site Assessment WDL 10/08/22 0100   Line Status Saline locked 10/08/22 0100   Dressing Intervention New dressing  09/10/22 0135   Phlebitis Scale 0-->no symptoms 10/08/22 0100   Infiltration Scale 0 10/08/22 0100   Number of days: 28       CVC Double Lumen Right Internal jugular Tunneled (Active)   Site Assessment [facility] 10/08/22 0100   External Cath Length (cm) 1 cm 10/03/22 1115   Dressing Type Chlorhexidine disk 10/08/22 0100   Dressing Status clean;dry;intact 10/08/22 0100   Dressing Intervention new dressing 10/03/22 1115   Dressing Change Due 10/10/22 10/05/22 0812   Line Necessity yes, meets criteria 10/04/22 2100   Blue - Status saline locked 10/06/22 2000   Blue - Cap Change Due 10/12/22 10/05/22 1140   Red - Status saline locked 10/06/22 2000   Red - Cap Change Due 10/12/22 10/05/22 1140   CVC Comment HD line 10/06/22 1410   Number of days: 12     Data:     LABS    CMP:   Recent Labs   Lab 10/08/22  0314 10/07/22  2140 10/07/22  1646 10/07/22  1356 10/07/22  0951 10/07/22  0627 10/07/22  0548 10/06/22  1051 10/06/22  0544 10/05/22  1002 10/05/22  0615 10/02/22  0824 10/02/22  0638   NA  --   --   --   --   --  135* 137  --  132*  --  135*   < > 136   POTASSIUM  --   --   --   --   --  4.3 3.9  --  3.9  --  4.2   < > 4.2   CHLORIDE  --   --   --   --   --  96* 95*  --  93*  --  92*   < > 96*   CO2  --   --   --   --   --  28 31*  --  34*  --  34*   < > 34*   ANIONGAP  --   --   --   --   --  11 11  --  5*  --  9   < > 6*   * 224* 159* 175*   < > 111* 106*   < > 144*  133*   < > 135*   < > 137*   BUN  --   --   --   --   --  35.3* 34.4*  --  27.3*  --  51.7*   < > 41.3*   CR  --   --   --   --   --  2.48* 2.40*  --  2.06*  --  2.48*   < > 2.25*   GFRESTIMATED  --   --   --   --   --  22* 22*  --  27*  --  22*   < > 24*   ESTUARDO  --   --   --   --   --  9.8 10.0  --  9.0  --  9.7   < > 8.9    MAG  --   --   --   --   --   --   --   --   --   --   --   --  2.0   PROTTOTAL  --   --   --   --   --  5.8* 6.5  --  5.4*  --  5.6*   < > 5.0*   ALBUMIN  --   --   --   --   --  3.4* 3.8  --  3.2*  --  3.4*   < > 3.0*   BILITOTAL  --   --   --   --   --  0.4 0.4  --  0.3  --  0.2   < > 0.2   ALKPHOS  --   --   --   --   --  76 88  --  92  --  91   < > 86   AST  --   --   --   --   --  19 20  --  21  --  18   < > 18   ALT  --   --   --   --   --  6* 15  --  11  --  7*   < > 12    < > = values in this interval not displayed.     CBC:   Recent Labs   Lab 10/07/22  0627 10/07/22  0548 10/06/22  0811 10/02/22  0638   WBC 6.7 6.8 7.9 7.8   RBC 2.89* 3.10* 2.80* 2.57*   HGB 9.5* 10.2* 9.3* 8.4*   HCT 30.6* 33.3* 29.8* 26.6*   * 107* 106* 104*   MCH 32.9 32.9 33.2* 32.7   MCHC 31.0* 30.6* 31.2* 31.6   RDW 19.9* 20.0* 19.8* 20.1*    255 214 285       INR:   Recent Labs   Lab 10/07/22  0627   INR 0.92       Glucose:   Recent Labs   Lab 10/08/22  0314 10/07/22  2140 10/07/22  1646 10/07/22  1356 10/07/22  0951 10/07/22  0627   * 224* 159* 175* 119* 111*       Blood Gas:   Recent Labs   Lab 10/03/22  0547   PHV 7.37   PCO2V 64*   PO2V 71*   HCO3V 37*   LINDY 10.2*   O2PER 20       Culture Data No results for input(s): CULT in the last 168 hours.    Virology Data:   Lab Results   Component Value Date    FLUAH1 Not Detected 09/13/2022    FLUAH3 Not Detected 09/13/2022    [id number] Not Detected 09/13/2022    IFLUB Not Detected 09/13/2022    RSVA Not Detected 09/13/2022    RSVB Not Detected 09/13/2022    PIV1 Not Detected 09/13/2022    PIV2 Not Detected 09/13/2022    PIV3 Not Detected 09/13/2022    HMPV Not Detected 09/13/2022       Historical CMV results (last 3 of prior testing):  Lab Results   Component Value Date    CMVQNT Not Detected 10/05/2022    CMVQNT Not Detected 09/30/2022    CMVQNT Not Detected 09/13/2022     No results found for: CMVLOG    Urine Studies    Recent Labs   Lab Test 09/19/22  9430  08/01/22  0319 07/08/22  0831   URINEPH 7.0 8.0* 5.5   NITRITE Negative Negative Negative   LEUKEST Large* Negative Negative   WBCU 29*  --  1       Most Recent [place] Pulmonary Function Testing (FVC/FEV1 only)  FVC-Pre   Date Value Ref Range Status   09/07/2022 1.01 L    09/01/2022 1.07 L    08/24/2022 1.23 L    08/18/2022 1.33 L      FVC-%Pred-Pre   Date Value Ref Range Status   09/07/2022 33 %    09/01/2022 35 %    08/24/2022 40 %    08/18/2022 43 %      FEV1-Pre   Date Value Ref Range Status   09/07/2022 0.98 L    09/01/2022 1.02 L    08/24/2022 1.17 L    08/18/2022 1.22 L      FEV1-%Pred-Pre   Date Value Ref Range Status   09/07/2022 40 %    09/01/2022 42 %    08/24/2022 48 %    08/18/2022 50 %

## 2022-10-08 NOTE — PROGRESS NOTES
HEMODIALYSIS TREATMENT NOTE    Date: 10/8/2022  Time: 10:45 AM    Data:  Pre Wt: 68 kg    Desired Wt: 66 kg   Post Wt: 66 kg   Weight change: 2 kg   Ultrafiltration - Post Run Net Total Removed (mL): 2000 mL   Vascular Access Status: CVC / patent  Dialyzer Rinse: light  Total Blood Volume Processed: 78.18 L  Total Dialysis (Treatment) Time: 3.5 hours  Dialysate Bath: K 3, Ca 2.25  Heparin: None    Lab:   No    Interventions:  5,000 units of Epoetin    Assessment:  iHD for patient with newly diagnosed ESRD. Had ERCP yesterday. A&Ox4. Calm and cooperative with cares. Denied pain or SOB. VSS throughout on room air. PCN came to unit to administer some scheduled medications. Patient slept on and off for most of run. CVC saline locked post-treatment and Clear Guard caps changed.     Plan:    Discharging today and to start dialysis outpatient next week.

## 2022-10-08 NOTE — PROGRESS NOTES
This is a hemodialysis visit note. This patient was seen and examined while on hemodialysis and is tolerating the treatment procedure fairly well. Please ensure that all medications are adjusted based on the patient's kidney function. Professional oversight of the patient's dialysis care, access care, and dialysis related co-morbidities were addressed. The plan of care was discussed with the nursing staff. We will continue to follow along.    Indication for dialysis is ESRD

## 2022-10-08 NOTE — PROGRESS NOTES
-Pt received orders that were carried out to discharge to home.  -Pt has arrangements to stay at the [facility] with her daughter for a recouperating period.  -Pt and daughter reviewed and understood her discharge orders, instructions, follow-up appointments and prescriptions.  -Pt up independently with safe judgement.  -Pt has arrangements for supplemental O2 and tube feeding supplies from various agencies.

## 2022-10-08 NOTE — PROGRESS NOTES
This is a recent snapshot of the patient's [facility] medical record.  For current drug dose and complete information and questions, call [phone] or In Basket pool, [facility] home infusion ([id number])  CSN Number:  [id number]

## 2022-10-09 NOTE — PLAN OF CARE
Lymphedema Discharge Summary    Reason for therapy discharge:    Discharged to local lodging.    Progress towards therapy goal(s). See goals on Care Plan in [facility] electronic health record for goal details.  Goals partially met.  Barriers to achieving goals:   discharge from facility.    Therapy recommendation(s):    Continue with use of Jobst stockings for long term edema management.

## 2022-10-09 NOTE — DISCHARGE SUMMARY
[facility]  Hospitalist Discharge Summary      Date of Admission:  9/9/2022  Date of Discharge:  10/8/2022  3:42 PM  Discharging Provider: Greg Pritchard MD  Discharge Service: Hospitalist Service, GOLD TEAM 10    Discharge Diagnoses     Please see Hospital Course below    Follow-ups Needed After Discharge   Follow-up Appointments     Adult [facility] Follow-up and recommended labs and tests      Follow up with Transplant Pulmonology as scheduled on 10/12    Follow up at your dialysis center for dialysis needs on Tuesday, Thursday   and Saturday     Appointments on [place] and/or [facility] (with [facility] or [facility]   provider or service). Call [phone] if you haven't heard regarding   these appointments within 7 days of discharge.             Unresulted Labs Ordered in the Past 30 Days of this Admission       Date and Time Order Name Status Description    9/15/2022 11:41 AM Fungus Culture, non-blood Preliminary     9/15/2022 11:41 AM Acid-Fast Bacilli Culture and Stain In process     9/13/2022 10:23 AM Fungal or Yeast Culture Routine Preliminary     9/13/2022 10:23 AM Nocardia species culture Preliminary     9/13/2022 10:23 AM Acid-Fast Bacilli Culture and Stain In process     9/13/2022  9:50 AM Fungal or Yeast Culture Routine Preliminary     9/13/2022  9:50 AM Nocardia species culture Preliminary     9/13/2022  9:50 AM Acid-Fast Bacilli Culture and Stain In process     9/8/2022  1:04 PM LABORATORY MISCELLANEOUS ORDER In process         These results will be followed up by Transplant Pulmonology     Discharge Disposition   Discharged to home  Condition at discharge: Good    Hospital Course     62 year old female with pertinent hx of end stage COPD s/p bilateral sequential lung transplant (6/22) on triple IS (MMF/Tacro/pred), pyloric ulcer, recent ERCP c/f hemobilia, gastroparesis s/p GJ tube placement and Percutaneous J tube presents for a planned admission from  transplant pulmonology to work up antibody medicated rejection versus infection. The following issues were addressed while inpatient:      #Acute kidney injury AKIN stage II on CKD stage IIIb (POA)-->ESRD on HD  - Patient had been variable GFRs 30-50s in the last few months. Most recent Cr trend 1.5-1.9 1.56 9/8/22 cystatin C obtained and showed GFR ~10  - Renal consulted, initiated on iHD  - Tunneled catheter placed   - Outpatient iHD is On TTS     #Diarrhea likely secondary to tube feeding, not present on admission, improved  - repeatedly ruled out for C. difficile colitis.   - c/w fibers at 28 g and Imodium as needed.     #Acute hypoxic hypercarbic respiratory failure secondary to bibasilar pleural effusion, unclear etiology   #S/p sequential lung transplant  Presented with chest tubes bilaterally on top of end-stage COPD s/p bilateral sequential lung transplant on 6/22/2022  This is the main problem that led to this admission, had bilateral chest tubes; patient had her right-sided chest tube removed on 9/22/2022 and removal of Left-sided chest tube 9/29/2022   -Continue 3 drug immunosuppression with azathioprine, prednisone, and tacrolimus  -Prednisone taper to be performed on 10/13/2022, will be done at time of follow up on 10/12  -Continue oxycodone a prn pain  -Increased gabapentin to 200 mg nightly  -Continue dapsone for PJP prophylaxis  -Appreciate transplant pulmonology service     #Severe gastroparesis s/p  GJ tube placement 7/27/2022  #Significant delayed gastric emptying s/p gastrojejunostomy tube placement, present on admission  - Continue jejunal tube for nutrition, G tube for venting   - Patient to vent throughout the night and PRN during the day  - repeat GE study 9/30/22, no major change  - TF to be cycled 12pm-6am  -Percutaneous j tube in place with G venting to gravity (vent all night and PRN)  -KUB on 10/5 ok      #Steroid-induced hyperglycemia and diabetes mellitus  - Receiving minimal insulin,  no Lantus for several days  - Will discontinue Lantus, sliding scale insulin      #HTN  # A flutter with RVR/SVT  - Has recently completed zio patch.  - Continue PTA metoprolol 25 mg BID     # Acute blood loss anemia secondary to pyloric ulcer on top of chronic anemia of chronic disease, all are present on admission  -Continue pantoprazole 40 mg twice daily  -Repeat endoscopy was to be done on 10/7 with ERCP. Unclear if EGD was done     #Acute metabolic encephalopathy secondary to urinary tract infection, not clear if present on admission, ordered a total of 5-day course of ceftriaxone for which the patient encephalopathy resolved     # extra hepatic and intrahepatic biliary dilation c/f hemobilia   # Intra ductal papillary mucinous neoplasm   - s/p ERCP 8/11 showed hemobilia. Repeat ERCP on 10/7, stents removed   - No longer need GI follow-up      #Acute on chronic heart failure with preserved ejection fraction LVEF 65%, the patient was appropriately diuresed, follow-up as outpatient        Consultations This Hospital Stay   PULMONARY CF/TRANSPLANT ADULT IP CONSULT  NUTRITION SERVICES ADULT IP CONSULT  NURSING TO CONSULT FOR VASCULAR ACCESS CARE IP CONSULT  NUTRITION SERVICES ADULT IP CONSULT  PHARMACY IP CONSULT  PHYSICAL THERAPY ADULT IP CONSULT  OCCUPATIONAL THERAPY ADULT IP CONSULT  CARE MANAGEMENT / SOCIAL WORK IP CONSULT  INTERVENTIONAL RADIOLOGY ADULT/PEDS IP CONSULT  PHARMACY IP CONSULT  NEPHROLOGY GENERAL ADULT IP CONSULT  PHARMACY IP CONSULT  INTERVENTIONAL RADIOLOGY ADULT/PEDS IP CONSULT  OCCUPATIONAL THERAPY ADULT IP CONSULT  NUTRITION SERVICES ADULT IP CONSULT  LYMPHEDEMA THERAPY IP CONSULT    Code Status   Prior    Time Spent on this Encounter   I, Greg Pritchard MD, personally saw the patient today and spent greater than 30 minutes discharging this patient.       Greg Pritchard MD  [facility] [address] [address]  Phone:  [phone]  ______________________________________________________________________    Physical Exam   Vital Signs: Temp: 98.7  F (37.1  C) Temp src: Oral BP: 120/75 Pulse: 91   Resp: 16 SpO2: 97 %   Oxygen Delivery: 1 LPM  Weight: 150 lbs 0 oz  Constitutional:Awake, alert, cooperative, no apparent distress  Respiratory: Clear to auscultation bilaterally  Cardiovascular: Regular rate and rhythm,   GI: Normal bowel sounds, soft, non-distended, non-tender  Skin/Integumen: No rashes, no cyanosis          Primary Care Physician   Kaylin Triana    Discharge Orders      Medication Therapy Management Referral      Home Infusion Referral      Reason for your hospital stay    You were hospitalized for ongoing kidney disease, initiated on dialysis     Activity    Your activity upon discharge: activity as tolerated     Adult [facility] Follow-up and recommended labs and tests    Follow up with Transplant Pulmonology as scheduled on 10/12    Follow up at your dialysis center for dialysis needs on Tuesday, Thursday and Saturday     Appointments on [place] and/or [facility] (with [facility] or [facility] provider or service). Call [phone] if you haven't heard regarding these appointments within 7 days of discharge.     Diet    Follow this diet upon discharge: Orders Placed This Encounter      Snacks/Supplements Adult: Nepro Oral Supplement; Between Meals      Adult Formula Drip Feeding: Specified Time Nepro with Carbsteady; Jejunostomy; Goal Rate: 55 mL/hr x 18 hrs (12:00-06:00 vs adjust timing due to dialysis) - adjusted timing per provider 10/7; mL/hr; From: 12:00 PM; 6:00 AM; Medication - Feeding Tu.       Significant Results and Procedures   Most Recent 3 CBC's:  Recent Labs   Lab Test 10/08/22  0746 10/07/22  0627 10/07/22  0548   WBC 7.2 6.7 6.8   HGB 9.3* 9.5* 10.2*   * 106* 107*    223 255   Most Recent 3 BMP's:  Recent Labs   Lab Test 10/08/22  1107 10/08/22  0746 10/08/22  0314 10/07/22  0951  10/07/22  0627 10/07/22  0548   NA  --  135*  --   --  135* 137   POTASSIUM  --  4.6  --   --  4.3 3.9   CHLORIDE  --  93*  --   --  96* 95*   CO2  --  32*  --   --  28 31*   BUN  --  56.9*  --   --  35.3* 34.4*   CR  --  3.22*  --   --  2.48* 2.40*   ANIONGAP  --  10  --   --  11 11   ESTUARDO  --  9.6  --   --  9.8 10.0   * 106* 173*   < > 111* 106*    < > = values in this interval not displayed.   ,   Results for orders placed or performed during the hospital encounter of 09/09/22   XR Chest Port 1 View    Narrative    Exam: XR CHEST PORT 1 VIEW, 9/12/2022 12:58 PM    Comparison: CT chest 9/8/2022, x-ray chest 9/7/2022.    History: Interval f/u    Findings:  Single AP semiupright view of the chest. Postsurgical changes of  bilateral lung transplant with intact clam shell sternotomy wires.    Trachea is midline. Stable cardiomediastinal silhouette. Perihilar and  bibasilar mixed opacities. No pneumothorax. Unchanged bilateral  loculated pleural effusions. The upper abdomen is unremarkable.      Impression    Impression:   1. Unchanged diffuse mixed opacities likely represent continued  pulmonary edema and atelectasis.  2. Unchanged bilateral loculated pleural effusions.    I have personally reviewed the examination and initial interpretation  and I agree with the findings.    CECI REGAN          SYSTEM ID:  [id number]   XR Chest Port 1 View    Narrative    EXAM: XR CHEST PORT 1 VIEW  9/13/2022 10:52 AM     HISTORY:  eval Pneumo post chest tube placement       COMPARISON:  9/12/2022    FINDINGS:   AP portable view of the chest. Postoperative changes of bilateral lung  transplant with clamshell sternotomy wires. Interval placement of a  right basilar chest tube. Midline trachea. Cardiomediastinal  silhouette is stable. No appreciable pneumothorax. Unchanged bilateral  loculated pleural effusions. Similar bibasilar and perihilar mixed  interstitial and airspace opacities. Visualized upper abdomen  is  unremarkable.      Impression    IMPRESSION:   1. Interval placement of a right basilar chest tube. No appreciable  pneumothorax.  2. Stable bilateral loculated pleural effusions.  3. Similar mixed interstitial and pulmonary opacities, likely  pulmonary edema and atelectasis.    I have personally reviewed the examination and initial interpretation  and I agree with the findings.    JOHANN MORAES MD         SYSTEM ID:  [id number]   XR Chest Port 1 View    Narrative    EXAM: XR CHEST PORT 1 VIEW  9/13/2022 2:57 PM     HISTORY:  new air leak following right chest tube placement       COMPARISON:  Same day chest radiograph    FINDINGS:   AP portable view of the chest. Midline trachea. Cardiomediastinal  silhouette is stable. Surgical changes of bilateral lung transplant  with clamshell sternotomy wires. Stable positioning of a right basilar  chest tube. Similar appearance of the loculated left pleural effusion  and mildly increased apical component of the loculated right pleural  effusion. No definite pneumothorax. Similar diffuse mixed interstitial  and airspace opacities. Visualized upper abdomen unremarkable.      Impression    IMPRESSION:   1. No appreciable pneumothorax with right-sided chest tube in place.  2. Similar appearance of the loculated left pleural effusion with  increased apical component of the loculated right pleural effusion.  3. Similar bilateral mixed interstitial and airspace opacities.    I have personally reviewed the examination and initial interpretation  and I agree with the findings.    GEORGE ROGERS MD         SYSTEM ID:  [id number]   XR Chest 2 Views    Narrative    EXAMINATION: XR CHEST 2 VIEWS, 9/14/2022 10:00 AM    COMPARISON: 9/13/2022    HISTORY: interval follow up, lung transplant, right chest tube    FINDINGS: Cardiac silhouette is enlarged and unchanged, partially  obscured. Right chest tube in the lung bases remains in place. Small  right pleural effusion is unchanged. Small  to moderate-sized left  pleural effusion is also unchanged. Mixed perihilar airspace and  interstitial opacities overall slightly increased.      Impression    IMPRESSION:   1. Overall increased mixed airspace and interstitial opacities in the  lungs. This could be due to edema or infection.  2. Continued left greater than right pleural effusions, not  substantially changed.    GEORGE ROGERS MD         SYSTEM ID:  [id number]   XR Abdomen 2 Views    Narrative    EXAM: XR abdomen PORT 2 VIEW  9/13/2022 2:57 PM     HISTORY:  new air leak following right chest tube placement       COMPARISON:  Abdominal radiograph 8/19/2022. Tube placement  fluoroscopy images 8/31/2022.    FINDINGS:   AP supine and upright portable view of the abdomen.  Gastrojejunostomy tube in place extending through the duodenum with  tip overlying the mid left abdomen on supine imaging and right-sided  abdomen on upright. Tip may have slightly retracted from prior  placement fluoroscopy images where this was extending into the right  lower quadrant small bowel loops although this appears to move  positionally between upright and supine imaging.  Common bile duct stent and pancreatic duct stents in place.  Visualized upper abdomen unremarkable. The bowel gas pattern is  nonobstructive. No visualized pneumatosis or portal venous gas.   Chest findings better evaluated on chest x-ray from same day.      Impression    IMPRESSION:   Percutaneous gastrojejunostomy tube in place with tip terminating  within the proximal jejunum. This may be slightly retracted from  fluoroscopy placement images on supine view although appears fairly  similar in position on the upright image.    I have personally reviewed the examination and initial interpretation  and I agree with the findings.    YANNICK BENAVIDEZ MD         SYSTEM ID:  [id number]   XR Chest 2 Views    Narrative    EXAM: XR CHEST 2 VIEWS  9/15/2022 10:08 AM     HISTORY:  interval follow up, lung  transplant, right chest tube       COMPARISON:  Chest x-ray 9/14/2022 and chest CT 9/8/2022    FINDINGS:   PA and lateral views of the chest. Postoperative changes of lung  transplantation with clamshell sternotomy wires and mediastinal  surgical clips. Stable positioning of a right basilar chest tube.  Midline trachea. Cardiomediastinal silhouette is stable. Stable small  left greater than right pleural effusions and small amount of fluid in  the right minor fissure. No pneumothorax. Mildly improved diffuse  mixed interstitial and hazy pulmonary opacities. Visualized upper  abdomen is unremarkable.      Impression    IMPRESSION:   1. Stable left greater than right bilateral pleural effusions with  right-sided chest tube in place.  2. Mildly improved diffuse mixed interstitial and pulmonary opacities.    I have personally reviewed the examination and initial interpretation  and I agree with the findings.    JOHANN MORAES MD         SYSTEM ID:  [id number]   CT Chest Tube with Cath Placement    Narrative    Procedure 9/15/2022:  CT guided left sided chest tube placement    History: Status post lung transplant with loculated left-sided fluid  collection, drainage requested.    Comparison: CT chest 9/8/2022    Operators:   Staff: Dr. Paul  Fellow: Dr. Paulo MORRISON, Dr. Manpreet Paul, was present for the critical part of the  procedure and immediately available for the entire procedure.    Medications:   Patient received local anesthesia only.    Findings/procedure:    Prior to the procedure, both verbal and written informed consent  obtained from the patient. Patient placed right lateral decubitus on  the CT table. Preprocedure scan obtained revealing the left-sided  fluid collection and adequate percutaneous approach for drain  placement.     The left prepped and draped. Timeout performed. 1% Lidocaine used for  local analgesia. Under intermittent CT fluoroscopic guidance, a 5F  [place] needle with needle  stylette advanced into the fluid  collection.     Stylette removed. There was return of serosanguineous fluid. Catheter  removed over a wire. Tract dilated to 14 Indonesian. 14 Indonesian nonlocking  J-tipped catheter advanced over the wire into the collection. Catheter  secured to the skin with a 2-0 Ethilon retention suture. Dressing  applied and catheter connected to bag drainage.      Impression    Impression:  Uncomplicated CT fluoroscopic guided left chest tube placement.    Plan: Catheter to water seal.    I have personally reviewed the examination and initial interpretation  and I agree with the findings.    MARK SOSA         SYSTEM ID:  [id number]   XR Chest 2 Views    Narrative    EXAM: XR CHEST 2 VIEWS  9/16/2022 10:27 AM     HISTORY:  interval follow up, lung transplant, right chest tube       COMPARISON:  Chest CT 9/8/2022 and chest radiograph 9/15/2022    FINDINGS:   PA and lateral views of the chest. Postoperative changes of bilateral  lung transplantation with a clamshell sternotomy wires. Bilateral  chest tubes. Stable bilateral left greater than right pleural  effusions. Small amount of fluid in the fissures. Similar diffuse  mixed interstitial and airspace opacities. Visualized upper abdomen is  unremarkable.      Impression    IMPRESSION:   1. Interval placement of a left sided chest tube. A right basilar  chest tube remains in similar position.  2. Similar left greater than right pleural effusions.  3. No significant change in the diffuse mixed interstitial and  airspace opacities.    I have personally reviewed the examination and initial interpretation  and I agree with the findings.    KANDACE ROJAS MD         SYSTEM ID:  [id number]   XR Chest 2 Views    Narrative     Examination:  XR CHEST 2 VIEWS     Date:  9/17/2022 1:12 PM      Clinical Information: interval follow up, lung transplant, right  chest tube     Additional Information: none    Comparison: 9/16/2022    Findings:   Clamshell  sternotomy, bilateral lung transplant. Bilateral chest  tubes. No focal opacities in the lungs. Small biapical pleural  effusions. Fluid in the minor fissure. Blunted left costophrenic  angle. Interstitial prominence. Cardiac silhouette slightly enlarged.  No acute osseous injury.      Impression    Impression:  1. No significant change bilateral lung transplant with bibasilar  chest tubes. Biapical loculated pleural effusions persist. The left  basal lateral perfusion not significantly changed.     MONSERRAT SEGAL MD         SYSTEM ID:  [id number]   XR Chest 2 Views    Narrative    XR CHEST 2 VIEWS  9/18/2022 8:47 AM      HISTORY: interval follow up, lung transplant, right chest tube    COMPARISON: 9/17/2022    FINDINGS:   PA and lateral views of the chest. Stable postoperative changes.  Stable bilateral chest tubes. Stable mediastinum. No pneumothorax.  Largely unchanged partially loculated bilateral pleural effusions.  Stable multifocal hazy and streaky opacities.      Impression    IMPRESSION:   1. Stable partially loculated bilateral pleural effusions with chest  tubes in similar position.  2. Largely unchanged multifocal atelectasis and probable mild  pulmonary edema.    I have personally reviewed the examination and initial interpretation  and I agree with the findings.    KANDACE ROJAS MD         SYSTEM ID:  [id number]   XR Chest 2 Views    Narrative    Chest 2 views    INDICATION: Interval follow-up, lung transplant    COMPARISON: Yesterday    FINDINGS: Clamshell sternotomy from prior bilateral lung  transplantation again noted. Continued streaky pulmonary opacities  appear similar. No ectopic air collection. Costophrenic angle blunting  on the left appears similar. Left basilar chest catheter also appears  similar. Right basilar thoracostomy tube appears similar. Right apical  volume loss appears similar. Calcification at the aortic knob appears  similar. Heart size borderline enlarged.      Impression     IMPRESSION: No significant changes in patient with underlying  bilateral lung transplantation.    ALVINA HARRY MD         SYSTEM ID:  [id number]   XR Chest Port 1 View    Narrative    EXAM: XR CHEST PORT 1 VIEW  9/20/2022 10:30 AM      HISTORY: interval follow up, chest tubes, lung transplant history    COMPARISON: Chest x-ray 9/19/2022, CT 9/8/2022    FINDINGS: Portable upright AP radiograph of the chest. Postsurgical  changes of bilateral lung transplantation with clamshell sternotomy  wires. Stable positioning of bilateral chest tubes. The trachea is  midline. The cardiac silhouette is not enlarged. Atherosclerotic  calcifications of the aortic arch. Stable small left pleural effusion.  Small amount of right fissural fluid. No pneumothorax. Mild streaky  perihilar and basilar opacities are slightly decreased. The visualized  upper abdomen is unremarkable.       Impression    IMPRESSION:   1. Small left pleural effusion is stable.  2. Mild streaky perihilar and basilar opacities are decreased.    I have personally reviewed the examination and initial interpretation  and I agree with the findings.    JOHANN MORAES MD         SYSTEM ID:  [id number]   CT Head w/o Contrast    Narrative    EXAM: CT HEAD W/O CONTRAST  9/20/2022 11:32 AM     HISTORY:  altered mental status of unclear etiology       COMPARISON:  CT head 7/22/2022    TECHNIQUE: Using multidetector thin collimation helical acquisition  technique, axial, coronal and sagittal CT images from the skull base  to the vertex were obtained without intravenous contrast.   (topogram) image(s) also obtained and reviewed.    FINDINGS:  No acute intracranial hemorrhage, mass effect, or midline shift. No  acute loss of gray-white matter differentiation in the cerebral  hemispheres. Ventricles are proportionate to the cerebral sulci. Clear  basal cisterns.    The bony calvaria and the bones of the skull base are normal. The  visualized portions of the paranasal  sinuses and mastoid air cells are  clear. Grossly normal orbits.       Impression    IMPRESSION: Mild motion artifact. No acute intracranial pathology.    I have personally reviewed the examination and initial interpretation  and I agree with the findings.    ANASTASIA WHITMAN MD         SYSTEM ID:  [id number]   XR Chest 2 Views    Narrative    Chest 2 views    INDICATION: Interval follow-up.  Lung transplant.    COMPARISON: Chest CT today an plain film yesterday    FINDINGS: Bilateral chest catheters are again present. Left  costophrenic angle blunting persists. Clamshell sternotomy from prior  bilateral lung transplant again noted. No new ectopic air collections  or suspicious opacities. Heart size normal.      Impression    IMPRESSION: Prior bilateral lung transplant with unchanged left  pleural effusion radiographically. Bibasilar chest catheters.    ALVINA HARRY MD         SYSTEM ID:  [id number]   CT Chest w/o Contrast    Narrative    CT chest without contrast    INDICATION: Follow-up for bilateral chest tube post bilateral pigtail  placement    COMPARISON: 9/15/2022 CT-guided imaging procedural films and  diagnostic CT 9/8/2022    FINDINGS: The included thyroid appears normal. Probable reactive  mediastinal lymph nodes are similar to 9/8/2022. Thoracic aorta is  normal in size. Heart size borderline enlarged  but there is left  atrial cardiac chamber enlargement. Multivessel coronary artery  calcium. Clamshell sternotomy from prior bilateral lung transplant. No  enlarged axillary lymph nodes. Decreased pleural effusions bilaterally  with bilateral chest tubes.  Detail of the lung parenchyma shows focal atelectatic changes  bilaterally with other septal thickening likely indicating residual  fluid. No suspicious ectopic air collection. Air noted within the  pleural effusions likely related the catheter placement.  Upper abdomen shows pneumobilia. Atelectatic changes with round  atelectasis in the left upper  lobe appears slightly decreased.  Loculated component of the right pleural effusion appears similar to  9/8/2022.  Bone detail again shows a clamshell sternotomy. Mineralization of  bones is otherwise good with mild degenerative disc changes in the  thoracic spine.      Impression    IMPRESSION: Decreased pleural effusions with continued prominent  loculated component of the right effusion. Overall slight decrease in  associated atelectasis. Prior bilateral lung transplant. Bilateral  chest catheters. Pneumobilia.    ALVINA HARRY MD         SYSTEM ID:  [id number]   US Lower Extremity Venous Duplex Left    Narrative    EXAMINATION: DOPPLER VENOUS ULTRASOUND OF THE LEFT LOWER EXTREMITY,  9/21/2022 4:22 PM     COMPARISON: None.    HISTORY: 62-year-old female with left lower extremity swelling.    TECHNIQUE:  Gray-scale evaluation with compression, spectral flow, and  color Doppler assessment of the deep venous system of the left leg  from groin to knee, and then at the ankle.    FINDINGS:  In the left lower extremity, the common femoral, femoral, popliteal  and posterior tibial veins demonstrate normal compressibility and  blood flow.      Impression    IMPRESSION:  1.  No evidence left lower extremity deep venous thrombosis.    I have personally reviewed the examination and initial interpretation  and I agree with the findings.    KANDACE ROJAS MD         SYSTEM ID:  [id number]   XR Chest 2 Views    Narrative    EXAM: XR CHEST 2 VIEWS  9/22/2022 11:16 AM      HISTORY: interval follow up, chest tubes, lung transplant history    COMPARISON: Chest x-ray 9/21/2022    FINDINGS: PA and lateral radiographs of the chest. Postsurgical  changes of bilateral lung transplantation with clamshell sternotomy  wires. Stable positioning of bibasilar chest tubes. The trachea is  midline. The cardiac silhouette is stable. Small left pleural  effusion. Fluid within the right major and minor fissures. No  pneumothorax. Stable streaky  perihilar and basilar opacities. The  visualized upper abdomen is unremarkable. Degenerative changes of the  spine.       Impression    IMPRESSION:   1. Small left pleural effusion.  2. Stable streaky perihilar and bibasilar opacities.  3. Postsurgical changes of bilateral lung transplantation with stable  positioning of bibasilar chest tubes.    I have personally reviewed the examination and initial interpretation  and I agree with the findings.    JOHANN MORAES MD         SYSTEM ID:  [id number]   US Renal Complete    Narrative    EXAMINATION: US RENAL COMPLETE, 9/22/2022 2:46 PM     COMPARISON: None.    HISTORY:acute kidney injury.    TECHNIQUE: The kidneys and bladder were scanned in the standard  fashion with specialized ultrasound transducer(s) using both gray  scale and limited color/spectral Doppler techniques.    FINDINGS:    Right kidney: Measures 8.0 cm in length. Parenchyma is of normal  thickness and echogenicity. No focal mass. No hydronephrosis.    Left kidney: Measures 8.2 cm in length. Parenchyma is of normal  thickness and echogenicity. No focal mass. No hydronephrosis.     Bladder: Partially distended and within normal limits.      Impression    IMPRESSION: No hydronephrosis.    I have personally reviewed the examination and initial interpretation  and I agree with the findings.    NIKOS JAVED MD         SYSTEM ID:  [id number]   XR Chest 2 Views    Narrative    EXAM: XR CHEST 2 VIEWS  9/23/2022 9:01 AM      HISTORY: interval follow up, chest tubes, lung transplant history    COMPARISON: Chest x-ray 9/22/2022    FINDINGS: PA and lateral radiographs of the chest. Postsurgical  changes of bilateral lung transplantation with clamshell sternotomy  wires. Stable left basilar chest tube. Interval removal of right  basilar chest tube. The trachea is midline. The cardiac silhouette is  not enlarged. Small left and likely trace right pleural effusions. No  pneumothorax. Atherosclerotic calcifications of  the aortic arch.  Stable perihilar and basilar opacities. Partially visualized  percutaneous gastrostomy tube.       Impression    IMPRESSION:   1. Small left and trace right pleural effusions.  2. Interval removal of right basilar chest tube. No pneumothorax.  3. Stable perihilar and basilar opacities.    I have personally reviewed the examination and initial interpretation  and I agree with the findings.    JOHANN MORAES MD         SYSTEM ID:  [id number]   IR CVC Tunnel Placement > 5 Yrs of Age    Narrative    PRE-PROCEDURE DIAGNOSIS:  End-stage renal disease    POST-PROCEDURE DIAGNOSIS:  Same    PROCEDURE: Tunneled central venous catheter placement    Impression    IMPRESSION:  Completed image-guided placement of 14.5 Stateless, 23 cm  double lumen tunneled central venous catheter via right internal  jugular vein. Catheter tip in high right atrium. Aspirates and flushes  freely, heparin locked and ready for immediate use.  No complication.     ----------    CLINICAL HISTORY:  Patient requires central venous access for dialysis  therapy.  Tunneled central venous catheter placement requested.    PERFORMED BY:  Jannet Gonzalez PA-C    CONSENT:  The patient understood the limitations, alternatives, and  risks of the procedure and agreed to the procedure.  Written informed  consent was obtained and is documented in the patient record.    SEDATION CONSENT:  It was explained to the patient that medications  used for sedation and pain relief may be administered, if needed, to  reduce anxiety and discomfort that may be associated with the  procedure.  Serious side effects from the medications are rare but may  include prolonged drowsiness and difficulty breathing, possibly  requiring placement of a breathing tube to ventilate the lungs.    MEDICATIONS:  Intravenous sedation was administered with 1 mg  midazolam and 50 mcg fentanyl.  A 10:1 volume mixture of 1% lidocaine  without epinephrine buffered with 8.4% bicarbonate  solution was  available for local anesthesia.  The catheter was heparin locked upon  completion of placement.    NURSING:  The patient was placed on continuous vital signs monitoring.   Vital signs and sedation were monitored by nursing staff under IR  physician staff supervision.      SEDATION TIME:  20 minutes face-to-face    FLUOROSCOPY TIME: minutes    DESCRIPTION:  The right neck and upper chest were prepped and draped  in the usual sterile fashion.      Under ultrasound guidance, the right internal jugular vein was  identified and the overlying skin was anesthetized and skin  dermatotomy was made.  Under ultrasound guidance, right internal  jugular venipuncture was made with needle.  Image saved documenting  venipuncture and patency.    Needle was exchanged over guidewire for a dilator under fluoroscopic  guidance.  Length to right atrium was measured with guidewire.   Guidewire and inner dilator were removed.  Wire was advanced into  inferior vena cava under fluoroscopic guidance and secured.     The anterior chest skin was anesthetized and incision was made after  measurements were taken.  A cuffed catheter was subcutaneously  tunneled from the anterior chest incision to the internal jugular  venipuncture site after path of tunnel was anesthetized.  The dilator  was exchanged over guidewire for a peel-away sheath.  Guidewire was  removed.  Under fluoroscopic guidance, the catheter was placed through  the peel-away sheath.  Peel-away sheath was removed.      Final catheter position saved.  Both catheter lumens adequately  aspirated and flushed.  Each lumen was heparin locked.  A catheter  retaining suture and sterile dressing were applied.  The skin  dermatotomy site overlying the internal jugular venipuncture was  closed with topical adhesive.    COMPLICATIONS:  No immediate concerns; the patient remained stable  throughout the procedure and tolerated it well.    ESTIMATED BLOOD LOSS:  Minimal    SPECIMENS:   None    DANIEL HERNANDEZ PA-C         SYSTEM ID:  [id number]   XR Chest 2 Views    Narrative    Exam: XR CHEST 2 VIEWS, 9/24/2022 9:53 AM    Indication: interval follow up, chest tubes, lung transplant history    Comparison: 9/23/2022, 9/21/2022    Findings:   Surgical changes of bilateral lung transplantation with clamshell  sternotomy wires and mediastinal surgical clips. Stable left pigtail  chest tube. Stable bilateral loculated pleural effusions, left greater  than right. No appreciable pneumothorax with resolution of pleural air  on the left. Stable mild streaky perihilar and medial bibasilar  opacities.      Impression    Impression: No significant change in small left greater than right  loculated pleural effusions with left chest tube in place. No  pneumothorax.    CECI REGAN DO         SYSTEM ID:  [id number]   XR Chest 2 Views    Narrative    EXAM: XR CHEST 2 VIEWS  9/25/2022 8:14 AM      HISTORY: interval follow up, chest tubes, lung transplant history    COMPARISON: 9/24/2022    FINDINGS: Two views of the chest. Stable position of left basilar  pigtail catheter chest tube. Prior lung transplantation with intact  clam shell sternotomy wires. Stable position of trachea.  Cardiomediastinal silhouette is similar to prior. Stable bilateral  loculated pleural effusions, left greater than right. No appreciable  pneumothorax. Stable streaky perihilar and bibasilar opacities. No new  focal consolidative opacity.      Impression    IMPRESSION:  Stable left greater than right loculated pleural effusions. Stable  left chest tube. No pneumothorax.    I have personally reviewed the examination and initial interpretation  and I agree with the findings.    CECI REGAN DO         SYSTEM ID:  [id number]   XR Chest 2 Views    Narrative    Chest 2 views    INDICATION: Interval follow-up, chest tubes, lung transplant history    COMPARISON: 9/25/2022    FINDINGS: Heart size upper normal. Clamshell sternotomy from  prior  bilateral lung transplant again evident. Left costophrenic angle  blunting unchanged. Left basilar chest catheter again noted. Right IJ  approach venous catheter noted with tip near the cavoatrial junction.  No pneumothorax. Scattered right lung subsegmental atelectasis.      Impression    IMPRESSION: Bilateral lung transplantation. Continued left pleural  effusion and associated atelectasis. Scattered right lung subsegmental  atelectasis.    ALVINA HARRY MD         SYSTEM ID:  [id number]   XR Chest 2 Views    Narrative    EXAM: XR CHEST 2 VIEWS  9/27/2022 1:55 PM     HISTORY:  chest tube - Clamped, assessing Ptx.       COMPARISON:  Chest x-ray 9/26/2022    FINDINGS:   PA and views of the chest. Post surgical changes of bilateral lung  transplant with sternotomy wires. Right IJ central venous catheter tip  projects over the superior cavoatrial junction. Grossly stable  position of left chest pigtail drain. Stable small left pleural  effusion. Small amount of fluid tracking in the right minor fissure.  No appreciable pneumothorax.      Impression    IMPRESSION:   1. No appreciable pneumothorax following clamping of the left chest  tube.  2. Stable small left pleural effusion.    I have personally reviewed the examination and initial interpretation  and I agree with the findings.    GEORGE ROGERS MD         SYSTEM ID:  [id number]   CT Chest w/o Contrast    Narrative    EXAMINATION: Chest CT  9/27/2022 4:13 PM    CLINICAL HISTORY: chest tube and air leak    COMPARISON: CT chest 9/21/2022.    TECHNIQUE: CT imaging obtained through the chest without contrast.  Axial, coronal, and sagittal reconstructions and axial MIP reformatted  images are reviewed.     CONTRAST: None    FINDINGS:  Support devices: Right IJ central venous catheter tip terminates in  the right atrium. Left basilar chest tube is coiled in the inferior  medial left pleural space.    Lungs: Postsurgical changes of bilateral lung transplant.  No  significant narrowing on either bronchial anastomosis. Mild adherent  debris in the upper trachea. The left upper lobe and left lower lobe  are rotated with respect to each other with displacement of the  fissure. The airways otherwise patent. No significant change in the  small bilateral hydropneumothoraces. Small amount of fluid tracking in  the right oblique fissure. Continued resolution of the nodular opacity  in the posterior portion of the left upper lobe, significantly  decreased in size since the 9/8/2022 chest CT. No new airspace  consolidation. Mild interstitial thickening in the lung bases.    Mediastinum: The thyroid is unremarkable. Cardiac size is normal.  Normal caliber aorta and main pulmonary artery. No significant  pericardial effusion. Moderate coronary artery calcium. No thoracic  lymphadenopathy. Esophagus is normal in caliber.    Bones and soft tissues: No suspicious bone findings. Intact clam shell  sternotomy wires.    Upper Abdomen: Limited evaluation of the upper abdomen. Central  pneumobilia.      Impression    IMPRESSION:   1. Grossly unchanged small bilateral hydropneumothoraces with left  chest tube coiled in the inferior medial left pleural space.  2. Continued resolution of the nodular opacity in the posterior left  upper lung. The lobes of the left upper lobe and lower lobe are  rotated with respect to each other. Consider CT with IV contrast to  assess vascular anatomy and patency.    I have personally reviewed the examination and initial interpretation  and I agree with the findings.    JOHANN MORAES MD         SYSTEM ID:  [id number]   NM Gastric Emptying    Narrative    EXAM:  NM GASTRIC EMPTYING, 9/30/2022 12:32 PM    HISTORY: severe delayed gastric emptying for follow up; is off  dialysis. NPO weds night    TECHNIQUE: The patient ingested 2.3 mCi of Tc-99m sulfur colloid in in  2 eggs, 1 slices of toast with jelly, and a glass of water. Static  images were acquired at  approximately 1 hour intervals out through 4  hours using a dual head gamma camera in an anterior and posterior  position. The calculation of emptying was based on dual head imaging  with geometric mean calculations.  A Linear square fit was calculated  to the emptying curve to determine the amount of residual activity at  each time point.    FINDINGS:   Percent retention at 60 min = 97%.  Percent retention at 120 min = 95%.  Percent retention at 180 min = 91%.  Percent retention at 240 min = 91%.    T 1/2 emptying time =  Too long to calculate.      Impression    IMPRESSION: Severe delayed gastric emptying (see normal ranges below).    =====================  Reference Range:  Gastric emptying  - 30 minutes: <70% of retention (> 30% emptying) suggests abnormally     fast emptying.  - 60 minutes: <90% retention (>10% emptying) is normal; less than 30%     retention (>70% emptying) suggests abnormally rapid emptying.  - 90 minutes: <65% retention (> 35% emptying) is normal.  - 120 minutes: <60% retention (> 40% emptying) is normal.  - 180 minutes: <30% retention (> 70% emptying) is normal.  - 180 minutes: <30% retention (> 70% emptying) is normal.  - 240 minutes: <10% retention (> 90% emptying) is normal.    Gastric emptying T-1/2:  Solid: The normal range is  minutes  Liquid only: Normal range is 10-45 minutes.  Liquid only-children: At 60 minutes, normal range is 44-58 % .  Liquid only-infants: At 60 minutes, normal range is 32-64 %.      I have personally reviewed the examination and initial interpretation  and I agree with the findings.    IVANIA ALVARADO MD         SYSTEM ID:  [id number]   XR Chest 2 Views    Narrative    XR CHEST 2 VIEWS  9/30/2022 7:53 AM     HISTORY:  interval follow up, lung transplant, left chest tube removal        COMPARISON:  CT 9/27/2022, radiograph 9/27/2022    FINDINGS:   Frontal and lateral views of the chest. Interval removal of the left  chest tube. Right IJ central venous  catheter tip projects over right  atrium. Clamshell sternotomy wires. Aortic arch atherosclerosis.  Trachea is midline. Cardiac silhouette is within normal limits.  Continued left perihilar opacity. Loculated fluid in the right major  fissure. Unchanged small left pleural effusion with associated  atelectasis. Degenerative changes at the visualized spine.      Impression    IMPRESSION: Interval removal of the left chest tube without  appreciable pneumothorax. Stable small left pleural effusion.    I have personally reviewed the examination and initial interpretation  and I agree with the findings.    JOHANN MORAES MD         SYSTEM ID:  [id number]   XR Chest 2 Views    Narrative    EXAMINATION:  XR CHEST 2 VIEWS 10/5/2022 2:18 PM.    COMPARISON: 9/3/2022 chest radiograph.    HISTORY:  interval follow up, lung transplant history    FINDINGS: Right internal jugular central venous catheter with tip  overlying the low SVC near cavoatrial junction. Stable clamshell  sternotomy wires. Gastrostomy tube with internal retention bulb  overlying left abdomen.    Cardiomediastinal silhouette and pulmonary vasculature are within  normal limits. Aortic arch calcifications. Unchanged perihilar  opacities. Persistent loculated fluid in the right major fissure.  Fluid collecting within minor fissure. Fluid collection along the free  wall of the left chest. Unchanged small left pleural effusion. Left  apical pleural thickening. No right-sided pleural effusion.  Degenerative changes of the visualized spine.      Impression    IMPRESSION:   1. Continued loculation of fluid in the right major fissure with fluid  collecting within the minor fissure.  2. Unchanged left-sided pleural effusion tracking along the left free  wall with left apical thickening.  3. Bilateral diffuse interstitial/airspace opacities, likely  atelectasis and/or consolidation.    I have personally reviewed the examination and initial interpretation  and I agree with  the findings.    JOHANN MORAES MD         SYSTEM ID:  [id number]   XR Abdomen 1 View    Narrative    Exam: XR ABDOMEN 1 VIEW, 10/5/2022 2:17 PM    Indication: bloating, increased gastric output    Comparison: 2022 CT abdomen and pelvis    Findings:   Supine view of the abdomen. Bowel gas pattern is nonobstructive. Small  volume of pneumobilia. Common bile duct stent and pancreatic duct  stent projecting over the right mid abdomen. Percutaneous GJ-tube is  postpyloric with the tip likely in the proximal jejunum in the left  abdomen. There is some mild gaseous distention of bowel loops in the  right upper quadrant demonstrated however overall pattern does not  appear obstructed with gas present to the rectosigmoid colon.      Impression    Impression: No acute findings. Support devices as described in the  report.    I have personally reviewed the examination and initial interpretation  and I agree with the findings.    YANNICK BENAVIDEZ MD         SYSTEM ID:  [id number]   XR Surgery LARISA G/T 5 Min Fluoro w Stills    Narrative    This exam was marked as non-reportable because it will not be read by a   radiologist or a [facility] non-radiologist provider.         Echo Limited     Value    LVEF  55-60%    Narrative    [id number]  [id number]  [id number]  [id number]^KRISTINA^MICHELLE^RAJ     [facility]  [address] [place] [address]     Name: ALMAS CASIANO  MRN: [id number]  : 1960  Study Date: 2022 02:54 PM  Age: 62 yrs  Gender: Female  [place] [facility] [place]  Reason For Study: ANAYA, Murmur  Ordering Physician: MICHELLE ACEVEDO  Performed By: Makayla Dupree RDCS     BSA: 1.7 m2  Height: 63 in  Weight: 150 lb  BP: 109/54 mmHg  ______________________________________________________________________________  Procedure  Limited Echocardiogram with portions of two-dimensional, color and spectral  Doppler  performed.  ______________________________________________________________________________  Interpretation Summary  Limited TTE to evaluate dyspnea.  Global and regional left ventricular function is normal with an EF of 55-60%.  Global right ventricular function is normal. The right ventricle is normal  size.  No significant valvular abnormalities.  No pericardial effusion is present.  This study was compared with the study from 08/15/2022. No significant changes  noted.  ______________________________________________________________________________  Left Ventricle  Global and regional left ventricular function is normal with an EF of 55-60%.  Moderate concentric wall thickening consistent with left ventricular  hypertrophy is present.     Right Ventricle  Global right ventricular function is normal. The right ventricle is normal  size.     Mitral Valve  Mild mitral insufficiency is present.     Aortic Valve  The valve leaflets are not well visualized. On Doppler interrogation, there is  no significant stenosis or regurgitation.     Tricuspid Valve  The tricuspid valve is normal. Trace tricuspid insufficiency is present.  Pulmonary artery systolic pressure cannot be assessed.     Pulmonic Valve  The valve leaflets are not well visualized. Trace pulmonic insufficiency is  present.     Vessels  The inferior vena cava was dilated at 2.6 cm without respiratory variability,  consistent with increased right atrial pressure. Unable to assess mean RA  pressure given the patient is on a ventilator.     Pericardium  No pericardial effusion is present.     Compared to Previous Study  This study was compared with the study from 08/15/2022 . No significant  changes noted.     ______________________________________________________________________________  MMode/2D Measurements & Calculations  IVSd: 1.7 cm  LVIDd: 3.9 cm  LVPWd: 1.7 cm     LV mass(C)d: 267.3 grams  LV mass(C)dI: 156.2 grams/m2  RWT: 0.87      ______________________________________________________________________________  Report approved by: Mary Donald 09/20/2022 03:26 PM               Discharge Medications   Discharge Medication List as of 10/8/2022  3:18 PM        START taking these medications    Details   azaTHIOprine (IMURAN) 5 mg/mL SUSP 20 mLs (100 mg) by Per J Tube route daily, Disp-600 mL, R-11, E-Prescribe      carboxymethylcellulose PF (REFRESH PLUS) 0.5 % ophthalmic solution Place 1 drop into both eyes 4 times daily as needed for dry eyes, Disp-50 each, R-3, E-Prescribe      cyanocobalamin (CYANOCOBALAMIN) 500 MCG tablet 1 tablet (500 mcg) by Per Feeding Tube route daily, Disp-30 tablet, R-11, E-Prescribe      dapsone 2 mg/mL SUSP 25 mLs (50 mg) by Per J Tube route Every Mon, Wed, Fri Morning, Disp-1000 mL, R-11, E-Prescribe      Guar Gum (FIBER MODULAR, NUTRISOURCE FIBER,) packet 1 packet by Per Feeding Tube route 2 times daily, Disp-75 each, R-3, E-Prescribe      traZODone (DESYREL) 50 MG tablet 0.5 tablets (25 mg) by Per J Tube route nightly as needed for sleep, Disp-15 tablet, R-11, E-Prescribe           CONTINUE these medications which have CHANGED    Details   acetaminophen (TYLENOL) 160 MG/5ML liquid 31.25 mLs (1,000 mg) by Per J Tube route 2 times daily, Disp-1200 mL, R-3, Historical      B and C vitamin Complex with folic acid (NEPHRONEX) 0.9 MG/5ML LIQD liquid 5 mLs by Per J Tube route daily, Disp-237 mL, R-11, E-Prescribe      folic acid (FOLVITE) 1 MG tablet 1 tablet (1 mg) by Per J Tube route daily, Disp-30 tablet, R-11, E-Prescribe      gabapentin (NEURONTIN) 250 MG/5ML solution 2 mLs (100 mg) by Per J Tube route At Bedtime, Disp-60 mL, R-11, E-Prescribe      insulin aspart (NOVOLOG PEN) 100 UNIT/ML pen Inject 1-3 Units Subcutaneous every 6 hours, Historical      loperamide (IMODIUM A-D) 2 MG tablet 1 tablet (2 mg) by Per J Tube route 4 times daily as needed for diarrhea, Historical      metoprolol tartrate (LOPRESSOR)  50 MG tablet 0.5 tablets (25 mg) by Per Feeding Tube route 2 times daily, Disp-60 tablet, R-11, Historical      predniSONE (DELTASONE) 5 MG tablet 2 tablets (10 mg) by Per J Tube route every morning AND 1.5 tablets (7.5 mg) every evening. Take 10 mg (two tabs) in the morning  and 7.5 mg (1.5 tabs) in the evening, Disp-120 tablet, R-3, No Print Out      tacrolimus (GENERIC EQUIVALENT) 1 mg/mL suspension 4 mLs (4 mg) by Per J Tube route 2 times daily, Disp-210 mL, R-11, Historical           CONTINUE these medications which have NOT CHANGED    Details   alcohol swab prep pads Use to swab area of injection/amos as directed.Disp-100 each, [id number]      blood glucose (NO BRAND SPECIFIED) lancets standard Use to test blood sugar 4 times daily or as directed.Disp-400 lancet, [id number]      insulin pen needle (31G X 5 MM) 31G X 5 MM miscellaneous Use one needle daily, as directedDisp-100 each, [id number]      pantoprazole (PROTONIX) 2 mg/mL SUSP suspension 20 mLs (40 mg) by Per J Tube route 2 times daily, Disp-1200 mL, R-11, E-Prescribe      Sharps Container MISC 1 sharps container, Disp-1 each, R-0, E-Prescribe      blood glucose (CONTOUR NEXT TEST) test strip Use to test blood sugar 4 times daily, as directed., Disp-400 strip, R-0, E-Prescribe      blood glucose monitoring (CONTOUR NEXT MONITOR W/DEVICE KIT) meter device kit Use to test blood sugar 4 times daily or as directed.Disp-1 kit, [id number]      calcium carbonate 600 mg-vitamin D 400 units (CALTRATE) 600-400 MG-UNIT per tablet 1 tablet by Per J Tube route 2 times daily (with meals), Disp-60 tablet, R-11, E-Prescribe      Nutritional Supplements (GLUCOSE MANAGEMENT) TABS Take 3-4 tablets by mouth as needed (hypoglycemia) Pharmacist may change instructions to fit whatever glucose tab product they have in stock., Disp-120 tablet, R-1, E-Prescribe      nystatin (MYCOSTATIN) 105864 UNIT/ML suspension Swish and swallow 10 mLs (1,000,000 Units) in  mouth 4 times dailyDisp-1200 mL, [id number]      ondansetron (ZOFRAN ODT) 4 MG ODT tab Take 1 tablet (4 mg) by mouth every 6 hours as needed for nausea or vomiting, Disp-30 tablet, R-0, E-Prescribe      oxyCODONE (ROXICODONE) 5 MG tablet 1 tablet (5 mg) by Per J Tube route every 8 hours as needed for breakthrough pain, Disp-15 tablet, R-0, E-Prescribe      protein modular (PROSOURCE TF) LIQD 1 packet by Per Feeding Tube route daily, Disp-30 packet, R-11, E-Prescribe           STOP taking these medications       aspirin (ASA) 81 MG EC tablet Comments:   Reason for Stopping:         fludrocortisone (FLORINEF) 0.1 MG tablet Comments:   Reason for Stopping:         furosemide (LASIX) 20 MG tablet Comments:   Reason for Stopping:         insulin glargine (LANTUS PEN) 100 UNIT/ML pen Comments:   Reason for Stopping:         Multiple Vitamins-Minerals (MULTIVITAMINS W/MINERALS) liquid Comments:   Reason for Stopping:         mycophenolate (GENERIC EQUIVALENT) 200 MG/ML suspension Comments:   Reason for Stopping:         simethicone (MYLICON) 40 MG/0.6ML suspension Comments:   Reason for Stopping:         valGANciclovir (VALCYTE) 50 MG/ML solution Comments:   Reason for Stopping:             Allergies   Allergies   Allergen Reactions    Blood Transfusion Related (Informational Only) Other (See Comments)     Patient has a history of a clinically significant antibody against RBC antigens.  A delay in compatible RBCs may occur.

## 2022-10-10 ENCOUNTER — TELEPHONE (OUTPATIENT)
Dept: PULMONOLOGY | Facility: CLINIC | Age: 62
End: 2022-10-10

## 2022-10-10 ENCOUNTER — TELEPHONE (OUTPATIENT)
Dept: TRANSPLANT | Facility: CLINIC | Age: 62
End: 2022-10-10

## 2022-10-10 DIAGNOSIS — T38.0X5A STEROID-INDUCED HYPERGLYCEMIA: ICD-10-CM

## 2022-10-10 DIAGNOSIS — D84.9 IMMUNOSUPPRESSED STATUS (H): Chronic | ICD-10-CM

## 2022-10-10 DIAGNOSIS — Z94.2 S/P LUNG TRANSPLANT (H): Chronic | ICD-10-CM

## 2022-10-10 DIAGNOSIS — R73.9 STEROID-INDUCED HYPERGLYCEMIA: ICD-10-CM

## 2022-10-10 LAB
ERCP: NORMAL
PROVATION GI EXAM: NORMAL

## 2022-10-10 NOTE — TELEPHONE ENCOUNTER
Writer returning call to compounding pharmacy.     Updated Rx for Tacrolimus sent to compounding pharmacy.

## 2022-10-10 NOTE — PLAN OF CARE
Physical Therapy Discharge Summary    Reason for therapy discharge:    Discharged to home with outpatient therapy.    Progress towards therapy goal(s). See goals on Care Plan in [facility] electronic health record for goal details.  Goals partially met.  Barriers to achieving goals:   discharge from facility.    Therapy recommendation(s):    Continued therapy is recommended.  Rationale/Recommendations:  for continued strengthening and mobility training.

## 2022-10-10 NOTE — TELEPHONE ENCOUNTER
Provider Call: Medication Clarification  Route to LPN  Name of Medication: Tacro; Question: recentlyD/c  Needs to clarify dose   Pharmacy Name: [facility]  Pharmacy Location: [facility]  Callback needed? Yes    Return Call Needed  Same as documented in contacts section  When to return call?: Same day: Route High Priority

## 2022-10-10 NOTE — PROGRESS NOTES
Occupational Therapy Discharge Summary    Reason for therapy discharge:    Discharged to home with outpatient therapy.    Progress towards therapy goal(s). See goals on Care Plan in [facility] electronic health record for goal details.  Goals partially met.  Barriers to achieving goals:   discharge from facility.    Therapy recommendation(s):    Continued therapy is recommended.  Rationale/Recommendations:  To progress functional endurance and activity tolerance to promote IND with ADL/IADLs. Recommend OP pulmonary rehab.        Quality 226: Preventive Care And Screening: Tobacco Use: Screening And Cessation Intervention: Patient screened for tobacco use and is an ex/non-smoker Quality 130: Documentation Of Current Medications In The Medical Record: Current Medications Documented Detail Level: Detailed

## 2022-10-10 NOTE — TELEPHONE ENCOUNTER
MTM referral from: Transitions of Care (recent hospital discharge or ED visit)    MTM referral outreach attempt on October 10, 2022 at 9:23 AM      Outcome: Patient is not interested at this time, will route to MTM Pharmacist/Provider as an FYI. Thank you for the referral.     Flores Blancas, [facility]

## 2022-10-10 NOTE — TELEPHONE ENCOUNTER
Date of Discharge: 10/8/22  Discharge diagnosis: admitted 9/9 with persistent dyspnea (increased with activity), daily morning hemoptysis, and increased BLE edema.  Also noted to have persistent, increased bilateral pleural effusions (s/p bilateral chest tubes), diffuse bilateral groundglass changes (s/p diuresis), and more focal appearing LLL infiltrates on imaging (s/p ABX).  S/p tunneled line placed and iHD initiation (9/26).  Discharge today to [place] after iHD run.     Summary of Discharge Recommendations: HD at [facility]- e/u/Sat      Need for home health: NA  Need for pulmonary rehab: Yes- first appointment scheduled for tomorrow 10/11      Medications reviewed: yes  Contact numbers for coordinator and on call coordinator reviewed: yes    Next RTC date/orders placed: yes  Next lab draw date: 10/12    Addressed patient/family questions and concerns. yes   Comments: Selina is caregiver staying with patient right now. Family is planning to switch out caregivers every Sunday. All questions answered at this time.

## 2022-10-11 ENCOUNTER — HOSPITAL ENCOUNTER (OUTPATIENT)
Dept: CARDIAC REHAB | Facility: CLINIC | Age: 62
Discharge: HOME | End: 2022-10-11
Attending: INTERNAL MEDICINE
Payer: MEDICARE

## 2022-10-11 ENCOUNTER — TELEPHONE (OUTPATIENT)
Dept: TRANSPLANT | Facility: CLINIC | Age: 62
End: 2022-10-11

## 2022-10-11 LAB
BACTERIA BRONCH: NO GROWTH
BACTERIA BRONCH: NO GROWTH
BACTERIA PLR CULT: NO GROWTH
BACTERIA PLR CULT: NO GROWTH

## 2022-10-11 PROCEDURE — G0239 OTH RESP PROC, GROUP: HCPCS

## 2022-10-11 RX ORDER — CONTAINER,EMPTY
EACH MISCELLANEOUS
Qty: 1 EACH | Refills: 0 | Status: SHIPPED | OUTPATIENT
Start: 2022-10-11 | End: 2023-11-29

## 2022-10-11 ASSESSMENT — ENCOUNTER SYMPTOMS
NAUSEA: 0
PARALYSIS: 0
EXERCISE INTOLERANCE: 0
HYPOTENSION: 0
DIARRHEA: 1
HYPERTENSION: 0
SPEECH CHANGE: 0
PALPITATIONS: 0
HEARTBURN: 0
JAUNDICE: 0
ORTHOPNEA: 0
SLEEP DISTURBANCES DUE TO BREATHING: 0
WEAKNESS: 0
DISTURBANCES IN COORDINATION: 0
ABDOMINAL PAIN: 0
SEIZURES: 0
BLOOD IN STOOL: 0
LEG PAIN: 0
TINGLING: 0
MEMORY LOSS: 0
BOWEL INCONTINENCE: 0
LIGHT-HEADEDNESS: 0
BLOATING: 0
TREMORS: 1
RECTAL PAIN: 0
HEADACHES: 0
CONSTIPATION: 0
NUMBNESS: 0
SYNCOPE: 0
VOMITING: 0
DIZZINESS: 0
LOSS OF CONSCIOUSNESS: 0

## 2022-10-11 NOTE — PROGRESS NOTES
Transplant Coordinator Note    Reason for visit: Post lung transplant follow up visit   Coordinator: Present   Caregiver:  Present     admitted 9/9 with persistent dyspnea (increased with activity), daily morning hemoptysis, and increased BLE edema.  Also noted to have persistent, increased bilateral pleural effusions (s/p bilateral chest tubes), diffuse bilateral groundglass changes (s/p diuresis), and more focal appearing LLL infiltrates on imaging (s/p ABX).  S/p tunneled line placed and iHD initiation (9/26).  Discharge today to [facility] after iHD run.     Health concerns addressed today:  1. ENT: Runny nose here and there, nothing steady.   2. Respiratory: No SOB. No cough. PFTs improved however not able to fully interpret.   3. GI: Denies feeling bloated/nauseous. Stools runny still, slightly improved from where they were.   4. Mood: At baseline.  5. Still making urine- small amount.   6. BP creeping up at night, if SBP goes above 140, we might adjust meds.     Activity/rehab: Pulm rehab- first appt 10/11/22. Walked from [facility] today, first time she has ever done this.   Oxygen needs: Only using oxygen at night, 1L.  Pain management/RX: tylenol and oxycodone as needed, incisional pain from time to time.   Diabetic management: on insulin- Novolog  Next Bronch due: Last bronch 9/13  Risk Criteria Labs: negative 7/28/22  CMV status: D+/R+  Valcyte stopped: POD 8-90  EBV status: D+/R+  AC/asa:  ASA discontinued after recent hospitalization   PJP prophylactic: Dapsone   Diet: Full liquid for pleasure. TF 12:00 noon-6AM (18 hours/day)    COVID:  1. COVID-19 infection (yes/no, date of most recent positive test):   2. Status/instructions given about COVID-19 vaccine: Vaccinated, booster x2 last 3/21/22. Received Evusheld 8/24/2022. Next due 2/24/2023    Pt Education: medications (use/dose/side effects), how/when to call coordinator, frequency of labs, s/s of infection/rejection, call prior to starting any new  medications, lab/vital sign book    Health Maintenance:     Last colonoscopy:     Next colonoscopy due:     Dermatology:    Vaccinations this visit:     Labs, CXR, PFTs reviewed with patient  Medication record reviewed and reconciled  Questions and concerns addressed    Patient Instructions  1. Continue to hydrate with 60-70 oz fluids daily.  2. Continue to exercise daily or most days of the week.  3. Follow up with your primary care provider for annual gender health maintenance procedures.  4. Follow up with colonoscopy schedule.  5. Follow up with annual dermatology visits.  6. It doesn't seem like the COVID vaccine is working well in lung transplant patients. A number of lung transplant patients have gotten sick with COVID even after receiving the vaccines.  Based on our recent experience, it can be life-threatening to get COVID  even after being vaccinated. Please continue to act like you did not get the COVID vaccine - social distancing, wearing a mask, good hand hygiene, etc. If the people around you are vaccinated, it will help reduce the risk of you getting COVID. All members of your household should be vaccinated.  7. You got your flu shot today.   8. Increase your fiber intake to 3 scoops a day to try and help bulk up your stool.   9. We will give you your covid booster after 11/10 (this is when you go down on your steroids again)  10. Next bronchoscopy will be mid Nov. We will schedule this at your next visit.     Next transplant clinic appointment: 2 weeks with CXR, labs and PFTs  Next lab draw: weekly      AVS printed at time of check out

## 2022-10-12 ENCOUNTER — LAB (OUTPATIENT)
Dept: LAB | Facility: CLINIC | Age: 62
End: 2022-10-12
Payer: MEDICARE

## 2022-10-12 ENCOUNTER — OFFICE VISIT (OUTPATIENT)
Dept: PULMONOLOGY | Facility: CLINIC | Age: 62
End: 2022-10-12
Attending: PHYSICIAN ASSISTANT
Payer: MEDICARE

## 2022-10-12 ENCOUNTER — TELEPHONE (OUTPATIENT)
Dept: PHARMACY | Facility: CLINIC | Age: 62
End: 2022-10-12

## 2022-10-12 ENCOUNTER — ANCILLARY PROCEDURE (OUTPATIENT)
Dept: GENERAL RADIOLOGY | Facility: CLINIC | Age: 62
End: 2022-10-12
Attending: INTERNAL MEDICINE
Payer: MEDICARE

## 2022-10-12 VITALS — DIASTOLIC BLOOD PRESSURE: 77 MMHG | OXYGEN SATURATION: 98 % | SYSTOLIC BLOOD PRESSURE: 148 MMHG | HEART RATE: 87 BPM

## 2022-10-12 DIAGNOSIS — Z23 NEED FOR VACCINATION: Primary | ICD-10-CM

## 2022-10-12 DIAGNOSIS — Z94.2 S/P LUNG TRANSPLANT (H): ICD-10-CM

## 2022-10-12 DIAGNOSIS — R52 GENERALIZED PAIN: ICD-10-CM

## 2022-10-12 DIAGNOSIS — Z94.2 LUNG REPLACED BY TRANSPLANT (H): ICD-10-CM

## 2022-10-12 DIAGNOSIS — Z79.899 ENCOUNTER FOR LONG-TERM (CURRENT) USE OF HIGH-RISK MEDICATION: ICD-10-CM

## 2022-10-12 LAB
ANION GAP SERPL CALCULATED.3IONS-SCNC: 11 MMOL/L (ref 7–15)
BUN SERPL-MCNC: 47.3 MG/DL (ref 8–23)
CALCIUM SERPL-MCNC: 9.3 MG/DL (ref 8.8–10.2)
CHLORIDE SERPL-SCNC: 92 MMOL/L (ref 98–107)
CREAT SERPL-MCNC: 2.1 MG/DL (ref 0.51–0.95)
DEPRECATED HCO3 PLAS-SCNC: 29 MMOL/L (ref 22–29)
ERYTHROCYTE [DISTWIDTH] IN BLOOD BY AUTOMATED COUNT: 18.6 % (ref 10–15)
EXPTIME-PRE: 4.79 SEC
FEF2575-%PRED-PRE: 101 %
FEF2575-PRE: 2.26 L/SEC
FEF2575-PRED: 2.22 L/SEC
FEFMAX-%PRED-PRE: 73 %
FEFMAX-PRE: 4.5 L/SEC
FEFMAX-PRED: 6.14 L/SEC
FEV1-%PRED-PRE: 50 %
FEV1-PRE: 1.23 L
FEV1FEV6-PRE: 94 %
FEV1FEV6-PRED: 81 %
FEV1FVC-PRE: 93 %
FEV1FVC-PRED: 79 %
FIFMAX-PRE: 3.69 L/SEC
FVC-%PRED-PRE: 42 %
FVC-PRE: 1.31 L
FVC-PRED: 3.06 L
GFR SERPL CREATININE-BSD FRML MDRD: 26 ML/MIN/1.73M2
GLUCOSE SERPL-MCNC: 120 MG/DL (ref 70–99)
HCT VFR BLD AUTO: 29.1 % (ref 35–47)
HGB BLD-MCNC: 9.4 G/DL (ref 11.7–15.7)
MAGNESIUM SERPL-MCNC: 2 MG/DL (ref 1.7–2.3)
MCH RBC QN AUTO: 33 PG (ref 26.5–33)
MCHC RBC AUTO-ENTMCNC: 32.3 G/DL (ref 31.5–36.5)
MCV RBC AUTO: 102 FL (ref 78–100)
PLATELET # BLD AUTO: 189 10E3/UL (ref 150–450)
POTASSIUM SERPL-SCNC: 3.8 MMOL/L (ref 3.4–5.3)
RBC # BLD AUTO: 2.85 10E6/UL (ref 3.8–5.2)
SODIUM SERPL-SCNC: 132 MMOL/L (ref 136–145)
TACROLIMUS BLD-MCNC: 9.1 UG/L (ref 5–15)
TME LAST DOSE: NORMAL H
TME LAST DOSE: NORMAL H
WBC # BLD AUTO: 8.3 10E3/UL (ref 4–11)

## 2022-10-12 PROCEDURE — 90682 RIV4 VACC RECOMBINANT DNA IM: CPT | Performed by: PHYSICIAN ASSISTANT

## 2022-10-12 PROCEDURE — 94375 RESPIRATORY FLOW VOLUME LOOP: CPT | Performed by: PHYSICIAN ASSISTANT

## 2022-10-12 PROCEDURE — 80048 BASIC METABOLIC PNL TOTAL CA: CPT | Performed by: PATHOLOGY

## 2022-10-12 PROCEDURE — G0008 ADMIN INFLUENZA VIRUS VAC: HCPCS | Performed by: PHYSICIAN ASSISTANT

## 2022-10-12 PROCEDURE — 71046 X-RAY EXAM CHEST 2 VIEWS: CPT | Performed by: RADIOLOGY

## 2022-10-12 PROCEDURE — 99496 TRANSJ CARE MGMT HIGH F2F 7D: CPT | Mod: 25 | Performed by: PHYSICIAN ASSISTANT

## 2022-10-12 PROCEDURE — 80197 ASSAY OF TACROLIMUS: CPT | Performed by: INTERNAL MEDICINE

## 2022-10-12 PROCEDURE — 36415 COLL VENOUS BLD VENIPUNCTURE: CPT | Performed by: PATHOLOGY

## 2022-10-12 PROCEDURE — 87799 DETECT AGENT NOS DNA QUANT: CPT | Performed by: INTERNAL MEDICINE

## 2022-10-12 PROCEDURE — 83735 ASSAY OF MAGNESIUM: CPT | Performed by: PATHOLOGY

## 2022-10-12 PROCEDURE — G0463 HOSPITAL OUTPT CLINIC VISIT: HCPCS | Mod: 25

## 2022-10-12 PROCEDURE — 85027 COMPLETE CBC AUTOMATED: CPT | Performed by: PATHOLOGY

## 2022-10-12 PROCEDURE — 250N000011 HC RX IP 250 OP 636: Performed by: PHYSICIAN ASSISTANT

## 2022-10-12 RX ORDER — ACETAMINOPHEN 160 MG/5ML
1000 LIQUID ORAL EVERY 6 HOURS PRN
Qty: 1200 ML | Refills: 3 | COMMUNITY
Start: 2022-10-12 | End: 2022-10-24

## 2022-10-12 RX ADMIN — INFLUENZA A VIRUS A/WISCONSIN/588/2019 (H1N1) RECOMBINANT HEMAGGLUTININ ANTIGEN, INFLUENZA A VIRUS A/DARWIN/6/2021 (H3N2) RECOMBINANT HEMAGGLUTININ ANTIGEN, INFLUENZA B VIRUS B/AUSTRIA/1359417/2021 RECOMBINANT HEMAGGLUTININ ANTIGEN, AND INFLUENZA B VIRUS B/PHUKET/3073/2013 RECOMBINANT HEMAGGLUTININ ANTIGEN 0.5 ML: 45; 45; 45; 45 INJECTION INTRAMUSCULAR at 10:37

## 2022-10-12 ASSESSMENT — PAIN SCALES - GENERAL: PAINLEVEL: NO PAIN (0)

## 2022-10-12 NOTE — PATIENT INSTRUCTIONS
Patient Instructions  1. Continue to hydrate with 60-70 oz fluids daily.  2. Continue to exercise daily or most days of the week.  3. Follow up with your primary care provider for annual gender health maintenance procedures.  4. Follow up with colonoscopy schedule.  5. Follow up with annual dermatology visits.  6. It doesn't seem like the COVID vaccine is working well in lung transplant patients. A number of lung transplant patients have gotten sick with COVID even after receiving the vaccines.  Based on our recent experience, it can be life-threatening to get COVID  even after being vaccinated. Please continue to act like you did not get the COVID vaccine - social distancing, wearing a mask, good hand hygiene, etc. If the people around you are vaccinated, it will help reduce the risk of you getting COVID. All members of your household should be vaccinated.  7. You got your flu shot today.   8. Increase your fiber intake to 3 scoops a day to try and help bulk up your stool.   9. We will give you your covid booster after 11/10 (this is when you go down on your steroids again)  10. Next bronchoscopy will be mid Nov. We will schedule this at your next visit.     Next transplant clinic appointment: 2 weeks with CXR, labs and PFTs  Next lab draw: weekly      AVS printed at time of check out          ~~~~~~~~~~~~~~~~~~~~~~~~~    Thoracic Transplant Office phone [phone], fax [phone]    Office Hours 8:30 - 5:00     For after-hours urgent issues, please dial [phone], and ask to speak with the Thoracic Transplant Coordinator On-Call.  --------------------  To expedite your medication refill(s), please contact your pharmacy and have them fax a refill request to: [phone]  .   *Please allow 3 business days for routine medication refills.  *Please allow 5 business days for controlled substance medication refills.    **For Diabetic medications and supplies refill(s), please contact your pharmacy and have them  contact your Endocrine team.  --------------------  For scheduling appointments call [phone].  --------------------  Please Note: If you are active on [employer], all future test results will be sent by [employer] message only, and will no longer be called to patient. You may also receive communication directly from your physician.

## 2022-10-12 NOTE — PROGRESS NOTES
This is a recent snapshot of the patient's [facility] medical record.  For current drug dose and complete information and questions, call [phone] or In Basket pool, [facility] ([id number])  CSN Number:  [id number]

## 2022-10-12 NOTE — NURSING NOTE
Chief Complaint   Patient presents with     Lung Transplant     Lung txp      Vitals were taken and medications were reconciled.    Jeanne Ordonez RMA  10:27 AM

## 2022-10-12 NOTE — LETTER
10/12/2022         RE: Sofie Rodriguez  [address] [place] [address]        Dear Colleague,    Thank you for referring your patient, Sofie Rodriguez, to the [facility] FOR LUNG [facility]ENCE AND [facility] [place]. Please see a copy of my visit note below.    Transplant Coordinator Note    Reason for visit: Post lung transplant follow up visit   Coordinator: Present   Caregiver:  Present     admitted 9/9 with persistent dyspnea (increased with activity), daily morning hemoptysis, and increased BLE edema.  Also noted to have persistent, increased bilateral pleural effusions (s/p bilateral chest tubes), diffuse bilateral groundglass changes (s/p diuresis), and more focal appearing LLL infiltrates on imaging (s/p ABX).  S/p tunneled line placed and iHD initiation (9/26).  Discharge today to [place] after iHD run.     Health concerns addressed today:  1. ENT: Runny nose here and there, nothing steady.   2. Respiratory: No SOB. No cough. PFTs improved however not able to fully interpret.   3. GI: Denies feeling bloated/nauseous. Stools runny still, slightly improved from where they were.   4. Mood: At baseline.  5. Still making urine- small amount.   6. BP creeping up at night, if SBP goes above 140, we might adjust meds.     Activity/rehab: Pulm rehab- first appt 10/11/22. Walked from [place] today, first time she has ever done this.   Oxygen needs: Only using oxygen at night, 1L.  Pain management/RX: tylenol and oxycodone as needed, incisional pain from time to time.   Diabetic management: on insulin- Novolog  Next Bronch due: Last bronch 9/13  Risk Criteria Labs: negative 7/28/22  CMV status: D+/R+  Valcyte stopped: POD 8-90  EBV status: D+/R+  AC/asa:  ASA discontinued after recent hospitalization   PJP prophylactic: Dapsone   Diet: Full liquid for pleasure. TF 12:00 noon-6AM (18 hours/day)    COVID:  1. COVID-19 infection (yes/no, date of most recent positive test):   2. Status/instructions  given about COVID-19 vaccine: Vaccinated, booster x2 last 3/21/22. Received Evusheld 8/24/2022. Next due 2/24/2023    Pt Education: medications (use/dose/side effects), how/when to call coordinator, frequency of labs, s/s of infection/rejection, call prior to starting any new medications, lab/vital sign book    Health Maintenance:     Last colonoscopy:     Next colonoscopy due:     Dermatology:    Vaccinations this visit:     Labs, CXR, PFTs reviewed with patient  Medication record reviewed and reconciled  Questions and concerns addressed    Patient Instructions  1. Continue to hydrate with 60-70 oz fluids daily.  2. Continue to exercise daily or most days of the week.  3. Follow up with your primary care provider for annual gender health maintenance procedures.  4. Follow up with colonoscopy schedule.  5. Follow up with annual dermatology visits.  6. It doesn't seem like the COVID vaccine is working well in lung transplant patients. A number of lung transplant patients have gotten sick with COVID even after receiving the vaccines.  Based on our recent experience, it can be life-threatening to get COVID  even after being vaccinated. Please continue to act like you did not get the COVID vaccine - social distancing, wearing a mask, good hand hygiene, etc. If the people around you are vaccinated, it will help reduce the risk of you getting COVID. All members of your household should be vaccinated.  7. You got your flu shot today.   8. Increase your fiber intake to 3 scoops a day to try and help bulk up your stool.   9. We will give you your covid booster after 11/10 (this is when you go down on your steroids again)  10. Next bronchoscopy will be mid Nov. We will schedule this at your next visit.     Next transplant clinic appointment: 2 weeks with CXR, labs and PFTs  Next lab draw: weekly      AVS printed at time of check out          [facility]  October 12,  2022         Assessment and Plan:   Sofie Rodriguez is a 62 y.o female s/p BSLT on 6/28/22 for COPD complicated by persistent bilateral pleural effusions, persistent CO2 retention, right hemidiaphragm palsy, BACILIO (dialysis 7/14-7/16), C. diff colitis, gastroparesis s/p GJ tube placement (7/27/22), GI bleed 2/2 pyloric ulcer, hemobilia s/p ERCP and MRCP, and persistent vasoplegia. Other history notable for HFpEF, NTM colonoization, hepatitis C, and methamphetamine use. Admitted 9/9-10/8 with persistent dyspnea, daily morning hemoptysis, and increased BLE edema. Also noted to have persistent, increased bilateral pleural effusions, diffuse bilateral groundglass changes, and more focal appearing LLL infiltrates on imaging. Treating with aggressive diuresis with some improvement in symptoms and hypoxia. Mild intermittent hypoxia and ANAYA persisted. S/p bilateral chest tubes. S/p tunneled line placed and iHD initiation (9/26). This is her first post discharge follow up.    1. S/p bilateral sequential lung transplant:  Acute hypoxia with chronic hypercapnia:   Pulmonary edema:  Persistent bibasilar pleural effusions:   Right hemidiaphragm palsy: Chest CT 9/8 with increased effusions and groundglass changes. DSA positive but stable. BNP markedly elevated 30k and also with increased BLE edema. Aggressively diuresed with some improvement in symptoms. Bronch 9/13 unremarkable, BAL of lingula sent, cultures no growth. S/p right pigtail chest tube placement 9/13-9/22, transudative, cultures negative. S/p aspiration of left pleural effusion 9/13 and then pigtail chest tube 9/15-9/29 s/p full lytic course 9/16-9/19, cultures also negative. Chest CT 9/27 with grossly unchanged small bilateral hydroPTX, continued resolution of nodular opacity in posterior MARLON, and the lobes of the left upper and lower are rotated with respect to each other (discussed at transplant conference 9/29, no intervention). Weaned to RA at rest on 9/24, using  1L NC overnight.  VBG with improved hypercapnia as of 9/29.  Overnight oximetry (10/3) incomplete given need for 1L NC with desaturation (unfortunately not reordered prior to discharge). CMV 10/5 negative. No new pulmonary complaints today and in fact, walked to the [facility] from the [facility] for the first time. Sating 98% on room air. CXR reviewed today and demonstrates stable effusions L>R.   - Supplemental O2 of 1 L overnight, will repeat overnight O2 study on room air in a month or so     Immunosuppression:  - Tacrolimus 4 mg BID.  Goal level 8-12  - Azathioprine 100 mg daily (9/20, MMF stopped due to ongoing diarrhea)  - Prednisone 10 mg qAM / 7.5 mg qPM with next taper due 10/13    Date AM dose (mg) PM dose (mg)   9/15/22 10 7.5   10/13/22 7.5 7.5   11/10/22 7.5 5   12/8/22 5 5   1/5/23 5 2.5      Prophylaxis:   - Dapsone qMWF for PJP ppx (resumed 9/19, monitor for worsening anemia; Bactrim stopped d/t hyperkalemia, will need to monitor for recurrence of anemia with dapsone; Pentamidine neb not tolerated on 9/7 after only 5 minutes d/t excessive coughing)     2. 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  - Monitoring [id number], next due 10/19    3. EBV viremia: level of 80 (log 2.9) on 10/5.   - EBV with next vist     4. Hypogammaglobulinemia: IgG adequate at time of transplant, repeat level low (336) on 7/28.  S/p IVIG 7/30, tolerated well.  IgG on 9/29 low at 559 but not indicating IVIG replacement.   - IgG with next visit    5. Encephalopathy, Resolved:  Tremors:   Presumed UTI: noted 9/19 with confusion as well as tremor. VBG with worsening hypercapnia (99).  BUN elevated (112). UA with moderate blood, large leukocyte esterase, but UC (9/19) negative. [place]ro not supratherapeutic. Head CT (9/20) without acute intracranial pathology.  AMS resolved ~9/22.  S/p ceftriaxone 5-day course.     6. ESRD based upon Cystatin C (GFR of 10): Nephrology consulted 9/21, see  their note for details, with concern for CKD5 as Cr may suggest overestimation of kidney function with limited muscle mass. Renal US (9/22) normal. Making a little urine. S/p tunneled line placed and HD initiated 9/26 on T/Th/Sat schedule.     7. Diarrhea: C diff negative on 9/10 and 9/20. Likely medication related (MMF), but unable to transition to Myfortic given NPO. Loperamide utilized with some benefit.  Enteric stool panel negative 9/16.  Transitioned from MMF to AZA as above. Stools still loose, maybe a little improved.  - Increase fiber to TID  - Imodium 1 tablet PRN, currently using 2 times/day     8. Severe gastroparesis:   Pyloric ulcer: gastric emptying study 7/20 with severe gastric emptying delay (95% retention at 4 hours), s/p GJ tube placement in IR 7/27.  S/p EGD (8/11) with mild erythema 2/2 GJ tube trauma seen near insertion site but no active bleeding. Repeat gastric emptying study 9/30 unfortunately with persistent severe retention (91% at 4h) and some fullness and nausea post study; defer adjustments to current plan and consider semi-permanent status as is. S/p EGD (may not have been performed as requested, not documented) and ERCP 10/7 with removal of stents and sludge; PEG/J tube replaced at that time.   - PPI BID  - TF cycled 18 hours; and ALL enteral medications via J tube  - G tube to gravity drainage overnight and prn during the day with GI symptoms (N/V, bloating, reflux); full liquid diet for comfort only  - Will need GI follow up with repeat gastric emptying study in a few months    RTC: 2 weeks; plan for bronch mid November  Vaccinations: flu shot today; Evusheld > February 2023; will need bivalent covid vaccine  Annual dermatology visit: need to address    Kaylin Triana PA-C  Pulmonary, Allergy, Critical Care and Sleep Medicine        Interval History:     Made it to pulm rehab yesterday, but going forward will only do Fridays. Walked over from the [facility] today, which is the  first time she has done that. Using 1 L O2 at night. No fever or chills, no sinus or nasal congestion, mild runny, no new or worsening shortness of breath. Minimal cough, no new chest , does have some numbness under both arm pits. No nausea or vomiting, no bloating, stools are loose, maybe slightly better.           Review of Systems:   Please see HPI, otherwise the complete 10 point ROS is negative.           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     Hepatitis 2017    Hep C, [facility]     HTN (hypertension)      Osteopenia      Past Surgical History:   Procedure Laterality Date     BRONCHOSCOPY (RIGID OR FLEXIBLE), DIAGNOSTIC N/A 8/2/2022    Procedure: BRONCHOSCOPY, DIAGNOSTIC- inspection Bronch;  Surgeon: Kamala Lovell MD;  Location:  GI     BRONCHOSCOPY (RIGID OR FLEXIBLE), DIAGNOSTIC N/A 9/13/2022    Procedure: INSPECTION BRONCHOSCOPY, WITH BRONCHOALVEOLAR LAVAGE;  Surgeon: Jose R Mccullough MD;  Location:  GI     BRONCHOSCOPY FLEXIBLE AND RIGID N/A 07/19/2022    Procedure: BRONCHOSCOPY inspection only;  Surgeon: Bob Liao MD;  Location:  GI     COLONOSCOPY  2015     CV CORONARY ANGIOGRAM N/A 06/30/2021    Procedure: CV CORONARY ANGIOGRAM;  Surgeon: Alexander Cuellar MD;  Location:  HEART CARDIAC CATH LAB     CV RIGHT HEART CATH MEASUREMENTS RECORDED N/A 06/30/2021    Procedure: CV RIGHT HEART CATH;  Surgeon: Alexander Cuellar MD;  Location:  HEART CARDIAC CATH LAB     ENDOSCOPIC RETROGRADE CHOLANGIOPANCREATOGRAM N/A 8/11/2022    Procedure: ENDOSCOPIC RETROGRADE CHOLANGIOPANCREATOGRAPHY WITH PANCREATIC DUCT NEEDLE KNIFE AND STENT PLACEMENT, BILE DUCT SPHINCTEROTOMY, BLOOD/DEBRIS REMOVAL AND STENT PLACEMENT;  Surgeon: Cosmo Arroyo MD;  Location:  OR     ENDOSCOPIC  RETROGRADE CHOLANGIOPANCREATOGRAM N/A 10/7/2022    Procedure: ENDOSCOPIC RETROGRADE CHOLANGIOPANCREATOGRAPHY with biliary and pancreatic stent removal, debris removal;  Surgeon: Cosmo Arroyo MD;  Location: [facility] OR     ENT SURGERY  1974    tonsillectomy     ENTEROSCOPY SMALL BOWEL N/A 8/11/2022    Procedure: SMALL BOWEL ENTEROSCOPY;  Surgeon: Cosmo Arroyo MD;  [facility]     ESOPHAGOGASTRODUODENOSCOPY, WITH NASOGASTRIC TUBE INSERTION N/A 07/01/2022    Procedure: ESOPHAGOGASTRODUODENOSCOPY, WITH NASOJEJUNAL TUBE INSERTION;  Surgeon: Ozzy Nickerson MD;  [facility]     ESOPHAGOSCOPY, GASTROSCOPY, DUODENOSCOPY (EGD), COMBINED N/A 8/3/2022    Procedure: ESOPHAGOGASTRODUODENOSCOPY (EGD);  Surgeon: Ira Andres MD;  Location:  GI     HAND SURGERY       INSERT CHEST TUBE Right 9/13/2022    Procedure: Insert chest tube;  Surgeon: Jose R Mccullough MD;  [facility]     IR CVC TUNNEL PLACEMENT > 5 YRS OF AGE  9/26/2022     IR GASTRO JEJUNOSTOMY TUBE CHANGE  8/31/2022     IR GASTRO JEJUNOSTOMY TUBE PLACEMENT  7/27/2022     IR THORACENTESIS  8/29/2022     LEEP TX, CERVICAL  04/07/2017    HECTOR III     LYMPH NODE BIOPSY Left 2005    Left axilla, benign- [place]     MIDLINE INSERTION - DOUBLE LUMEN Left 07/28/2022    20cm, Basilic vein     REPLACE GASTROJEJUNOSTOMY TUBE, PERCUTANEOUS  10/7/2022    Procedure: Replace Gastrojejunostomy Tube;  Surgeon: Cosmo Arroyo MD;  [facility]     THORACENTESIS Left 8/29/2022    Procedure: THORACENTESIS;  Surgeon: Bo Capone PA-C;  Location: [facility] OR     THORACENTESIS Left 9/13/2022    Procedure: Thoracentesis;  Surgeon: Jose R Mccullough MD;  [facility]: [facility] GI     THROMBECTOMY UPPER EXTREMITY Left 07/02/2022    Procedure: LEFT RADIAL ARM THROMBECTOMY;  Surgeon: Christie Graham MD;  [facility]     TRANSPLANT LUNG RECIPIENT SINGLE X2 Bilateral 06/28/2022    Procedure: Clamshell Incision, Bilateral  Sequential Lung Transplant, On Cardiopulmonary Bypass, Flexible Bronchoscopy;  Surgeon: Sue Sunshine MD;  Location:  OR           Family History:     No family history on file.         Social History:     Social History     Socioeconomic History     Marital status: Single     Spouse name: Not on file     Number of children: Not on file     Years of education: Not on file     Highest education level: Not on file   Occupational History     Not on file   Tobacco Use     Smoking status: Former     Years: 30.00     Types: Cigarettes     Quit date: 2020     Years since quittin.9     Smokeless tobacco: Never   Substance and Sexual Activity     Alcohol use: Not Currently     Drug use: Not Currently     Types: Marijuana, Methamphetamines     Comment: hx:marijuana and methamphetamine-quit both unsure ?  2-3 yrs ago     Sexual activity: Not on file   Other Topics Concern     Parent/sibling w/ CABG, MI or angioplasty before 65F 55M? Not Asked   Social History Narrative     Not on file     Social Determinants of Health     Financial Resource Strain: Not on file   Food Insecurity: Not on file   Transportation Needs: Not on file   Physical Activity: Not on file   Stress: Not on file   Social Connections: Not on file   Intimate Partner Violence: Not on file   Housing Stability: Not on file            Medications:     Current Outpatient Medications   Medication     acetaminophen (TYLENOL) 160 MG/5ML liquid     alcohol swab prep pads     azaTHIOprine (IMURAN) 5 mg/mL SUSP     B and C vitamin Complex with folic acid (NEPHRONEX) 0.9 MG/5ML LIQD liquid     blood glucose (CONTOUR NEXT TEST) test strip     blood glucose (NO BRAND SPECIFIED) lancets standard     blood glucose monitoring (CONTOUR NEXT MONITOR W/DEVICE KIT) meter device kit     calcium carbonate 600 mg-vitamin D 400 units (CALTRATE) 600-400 MG-UNIT per tablet     cyanocobalamin (CYANOCOBALAMIN) 500 MCG tablet     dapsone 2 mg/mL SUSP     folic acid  (FOLVITE) 1 MG tablet     gabapentin (NEURONTIN) 250 MG/5ML solution     Guar Gum (FIBER MODULAR, NUTRISOURCE FIBER,) packet     insulin aspart (NOVOLOG PEN) 100 UNIT/ML pen     insulin pen needle (31G X 5 MM) 31G X 5 MM miscellaneous     loperamide (IMODIUM A-D) 2 MG tablet     metoprolol tartrate (LOPRESSOR) 50 MG tablet     Nutritional Supplements (GLUCOSE MANAGEMENT) TABS     nystatin (MYCOSTATIN) 809577 UNIT/ML suspension     ondansetron (ZOFRAN ODT) 4 MG ODT tab     oxyCODONE (ROXICODONE) 5 MG tablet     pantoprazole (PROTONIX) 2 mg/mL SUSP suspension     predniSONE (DELTASONE) 5 MG tablet     protein modular (PROSOURCE TF) LIQD     [employer]ar[employer] Container MISC     tacrolimus (GENERIC EQUIVALENT) 1 mg/mL suspension     traZODone (DESYREL) 50 MG tablet     No current facility-administered medications for this visit.            Physical Exam:   BP (!) 148/77   Pulse 87   SpO2 98%     GENERAL: alert, NAD  HEENT: NCAT, EOMI, no scleral icterus, oral mucosa moist and without lesions  Neck: no cervical or supraclavicular adenopathy  Lungs: good air flow, few scattered  CV: RRR, S1S2, + murmur  Abdomen: normoactive BS, soft, non tender  Lymph: no edema  Neuro: AAO X 3, CN 2-12 grossly intact  Psychiatric: normal affect, good eye contact  Skin: no rash, jaundice or lesions on limited exam         Data:   All laboratory and imaging data reviewed.      Recent Results (from the past 168 hour(s))   Glucose by meter    Collection Time: 10/05/22  3:44 PM   Result Value Ref Range    GLUCOSE BY METER POCT 130 (H) 70 - 99 mg/dL   Glucose by meter    Collection Time: 10/05/22 10:01 PM   Result Value Ref Range    GLUCOSE BY METER POCT 175 (H) 70 - 99 mg/dL   Comprehensive metabolic panel    Collection Time: 10/06/22  5:44 AM   Result Value Ref Range    Sodium 132 (L) 136 - 145 mmol/L    Potassium 3.9 3.4 - 5.3 mmol/L    Chloride 93 (L) 98 - 107 mmol/L    Carbon Dioxide (CO2) 34 (H) 22 - 29 mmol/L    Anion Gap 5 (L) 7 - 15 mmol/L     Urea Nitrogen 27.3 (H) 8.0 - 23.0 mg/dL    Creatinine 2.06 (H) 0.51 - 0.95 mg/dL    Calcium 9.0 8.8 - 10.2 mg/dL    Glucose 133 (H) 70 - 99 mg/dL    Alkaline Phosphatase 92 35 - 104 U/L    AST 21 10 - 35 U/L    ALT 11 10 - 35 U/L    Protein Total 5.4 (L) 6.4 - 8.3 g/dL    Albumin 3.2 (L) 3.5 - 5.2 g/dL    Bilirubin Total 0.3 <=1.2 mg/dL    GFR Estimate 27 (L) >60 mL/min/1.73m2   Tacrolimus by Tandem Mass Spectrometry    Collection Time: 10/06/22  5:44 AM   Result Value Ref Range    Tacrolimus by Tandem Mass Spectrometry 9.8 5.0 - 15.0 ug/L    Tacrolimus Last Dose Date      Tacrolimus Last Dose Time     Glucose by meter    Collection Time: 10/06/22  5:44 AM   Result Value Ref Range    GLUCOSE BY METER POCT 144 (H) 70 - 99 mg/dL   Lipase    Collection Time: 10/06/22  5:44 AM   Result Value Ref Range    Lipase 17 13 - 60 U/L   CBC with platelets    Collection Time: 10/06/22  8:11 AM   Result Value Ref Range    WBC Count 7.9 4.0 - 11.0 10e3/uL    RBC Count 2.80 (L) 3.80 - 5.20 10e6/uL    Hemoglobin 9.3 (L) 11.7 - 15.7 g/dL    Hematocrit 29.8 (L) 35.0 - 47.0 %     (H) 78 - 100 fL    MCH 33.2 (H) 26.5 - 33.0 pg    MCHC 31.2 (L) 31.5 - 36.5 g/dL    RDW 19.8 (H) 10.0 - 15.0 %    Platelet Count 214 150 - 450 10e3/uL   Glucose by meter    Collection Time: 10/06/22 10:51 AM   Result Value Ref Range    GLUCOSE BY METER POCT 164 (H) 70 - 99 mg/dL   Glucose by meter    Collection Time: 10/06/22  2:54 PM   Result Value Ref Range    GLUCOSE BY METER POCT 147 (H) 70 - 99 mg/dL   Glucose by meter    Collection Time: 10/06/22  9:20 PM   Result Value Ref Range    GLUCOSE BY METER POCT 154 (H) 70 - 99 mg/dL   Glucose by meter    Collection Time: 10/07/22  5:42 AM   Result Value Ref Range    GLUCOSE BY METER POCT 117 (H) 70 - 99 mg/dL   Comprehensive metabolic panel    Collection Time: 10/07/22  5:48 AM   Result Value Ref Range    Sodium 137 136 - 145 mmol/L    Potassium 3.9 3.4 - 5.3 mmol/L    Chloride 95 (L) 98 - 107 mmol/L     Carbon Dioxide (CO2) 31 (H) 22 - 29 mmol/L    Anion Gap 11 7 - 15 mmol/L    Urea Nitrogen 34.4 (H) 8.0 - 23.0 mg/dL    Creatinine 2.40 (H) 0.51 - 0.95 mg/dL    Calcium 10.0 8.8 - 10.2 mg/dL    Glucose 106 (H) 70 - 99 mg/dL    Alkaline Phosphatase 88 35 - 104 U/L    AST 20 10 - 35 U/L    ALT 15 10 - 35 U/L    Protein Total 6.5 6.4 - 8.3 g/dL    Albumin 3.8 3.5 - 5.2 g/dL    Bilirubin Total 0.4 <=1.2 mg/dL    GFR Estimate 22 (L) >60 mL/min/1.73m2   CBC with platelets    Collection Time: 10/07/22  5:48 AM   Result Value Ref Range    WBC Count 6.8 4.0 - 11.0 10e3/uL    RBC Count 3.10 (L) 3.80 - 5.20 10e6/uL    Hemoglobin 10.2 (L) 11.7 - 15.7 g/dL    Hematocrit 33.3 (L) 35.0 - 47.0 %     (H) 78 - 100 fL    MCH 32.9 26.5 - 33.0 pg    MCHC 30.6 (L) 31.5 - 36.5 g/dL    RDW 20.0 (H) 10.0 - 15.0 %    Platelet Count 255 150 - 450 10e3/uL   CBC with platelets    Collection Time: 10/07/22  6:27 AM   Result Value Ref Range    WBC Count 6.7 4.0 - 11.0 10e3/uL    RBC Count 2.89 (L) 3.80 - 5.20 10e6/uL    Hemoglobin 9.5 (L) 11.7 - 15.7 g/dL    Hematocrit 30.6 (L) 35.0 - 47.0 %     (H) 78 - 100 fL    MCH 32.9 26.5 - 33.0 pg    MCHC 31.0 (L) 31.5 - 36.5 g/dL    RDW 19.9 (H) 10.0 - 15.0 %    Platelet Count 223 150 - 450 10e3/uL   Comprehensive metabolic panel    Collection Time: 10/07/22  6:27 AM   Result Value Ref Range    Sodium 135 (L) 136 - 145 mmol/L    Potassium 4.3 3.4 - 5.3 mmol/L    Chloride 96 (L) 98 - 107 mmol/L    Carbon Dioxide (CO2) 28 22 - 29 mmol/L    Anion Gap 11 7 - 15 mmol/L    Urea Nitrogen 35.3 (H) 8.0 - 23.0 mg/dL    Creatinine 2.48 (H) 0.51 - 0.95 mg/dL    Calcium 9.8 8.8 - 10.2 mg/dL    Glucose 111 (H) 70 - 99 mg/dL    Alkaline Phosphatase 76 35 - 104 U/L    AST 19 10 - 35 U/L    ALT 6 (L) 10 - 35 U/L    Protein Total 5.8 (L) 6.4 - 8.3 g/dL    Albumin 3.4 (L) 3.5 - 5.2 g/dL    Bilirubin Total 0.4 <=1.2 mg/dL    GFR Estimate 22 (L) >60 mL/min/1.73m2   INR    Collection Time: 10/07/22  6:27 AM    Result Value Ref Range    INR 0.92 0.85 - 1.15   Amylase    Collection Time: 10/07/22  6:27 AM   Result Value Ref Range    Amylase 42 28 - 100 U/L   Lipase    Collection Time: 10/07/22  6:27 AM   Result Value Ref Range    Lipase 13 13 - 60 U/L   ENDOSCOPIC RETROGRADE CHOLANGIOPANCREATOGRAPHY    Collection Time: 10/07/22  7:44 AM   Result Value Ref Range    ERCP       [address] [place] [address] [phone]     Endoscopy Department  _______________________________________________________________________________  Patient Name: Sofie Rodriguez            Procedure Date: 10/7/2022 7:44 AM  MRN: [id number]                       Account Number: [id number]  YOB: 1960               Admit Type: Inpatient  Age: 62                               Room:  OR   Gender: Female                        Note Status: Finalized  Attending MD: MERY FINE MD Pause for the Cause: Completed with Time   Out  Total Sedation Time:                    _______________________________________________________________________________     Procedure:             ERCP  Indications:           Sofie Nichols a 62 year old female with a PMHx of                          COPD s/p bilateral lung transplant (6/28/22)                          complicated by acute limb ischemia of LUE requiring                           left radial thrombectomy, EBV viremia and C.diff                          (vancomycin 7/12/22-7/28/22). 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 She                          was admitted for melena back in June and found to have                          hemobilia. She also had elevated  LFTs during the same                          admission and MRCP showed mild dilation of                          intrahepatic and extrahepatic biliary ducts and                          gallbladder with sludge extending to the bladder neck.                          She underwent an ERCP which showed hemobilia. A                          biliary sphincterotomy was performed and a                          transpabillary G ore Viabil [id number] was placed in the                          common bile duct. A 4 Fr by 11 cm temporary Arroyo                          pancreatic was placed in the PD. She since had                          cholecystectomy. LFTs are have been normal since. She                          presents for an ERCP for stent removal.  Providers:             MERY ARROYO MD, ROLANDO Edwards MD:          AMADOR BROWN MD  Requesting Provider:   MONSERRAT CANALES, VALERIE DOWELL MD  Medicines:             General Anesthesia, Levaquin 500 mg IV  Complications:         No immediate complications.  _______________________________________________________________________________  Procedure:             Pre-Anesthesia Assessment:                         - Prior to the procedure, a History and Physical was                          performed, and patient medications and allergies were                          reviewed. The patient is competent. The risks and                           benefits of the procedure and the sedation options and                          risks were discussed with the patient. All questions                          were answered and informed consent was obtained.                          Patient identification and proposed procedure were                          verified by the physician, the nurse and the                          anesthesiologist in the procedure room. Mental Status                          Examination: normal. Airway Examination: normal                           oropharyngeal airway and neck mobility. Respiratory                          Examination: clear to auscultation. CV Examination:                          normal. Prophylactic Antibiotics: The patient requires                          prophylactic antibiotics as clinically indicated based                          on published guidelines for the planned ERCP. The                          patient received antibiotic therapy before the                           procedure. Prior Anticoagulants: The patient has taken                          no anticoagulant or antiplatelet agents. ASA Grade                          Assessment: II - A patient with mild systemic disease.                          After reviewing the risks and benefits, the patient                          was deemed in satisfactory condition to undergo the                          procedure. The anesthesia plan was to use general                          anesthesia. Immediately prior to administration of                          medications, the patient was re-assessed for adequacy                          to receive sedatives. The heart rate, respiratory                          rate, oxygen saturations, blood pressure, adequacy of                          pulmonary ventilation, and response to care were                          monitored throughout the procedure. The physical                          status of the patient was re-assessed after the                          proce dure.                         After obtaining informed consent, the scope was passed                          under direct vision. Throughout the procedure, the                          patient's blood pressure, pulse, and oxygen                          saturations were monitored continuously. The                          Duodenoscope was introduced through the mouth, and                          used to inject contrast into and used to inject                           contrast into the bile duct. The ERCP was accomplished                          without difficulty. The patient tolerated the                          procedure well.                                                                                   Findings:       A biliary stent was visible on the  film. The esophagus was        successfully intubated under direct vision. The scope was advanced to a        normal major papilla in the descending duodenum without detailed        examination of the pharynx, larynx and  associated structures, and upper        GI tract. The upper GI tract was grossly normal. One covered metal stent        originating in the biliary tree was emerging from the major papilla. The        stent was visibly patent. One temporary plastic stent originating in the        pancreatic duct was emerging from the major papilla. The stent was        visibly patent. One stent was removed from the biliary tree using a        snare. One stent was removed from the pancreatic duct using a snare. A        biliary sphincterotomy had been performed. The sphincterotomy appeared        open. The bile duct was deeply cannulated with the short-nosed traction        sphincterotome in concert with a 0.025 visiglide, short angled. Contrast        was injected. I personally interpreted the bile duct images. Ductal flow        of contrast was adequate. Image quality was excellent. Opacification of        the left intrahepatic branches and right intrahepatic branches was        successful. The biliar y tree was swept with a 12 mm balloon starting at        the bifurcation. Sludge was swept from the duct.                                                                                   Impression:            - Prior biliary and pancreatic stent were removed with                          a snare                         - Prior biliary sphincterotomy was patent and                          selective biliary  cannulation was performed                         - The common bile duct was swept with a balloon with                          extraction of sludge                         - Occlusion cholangiogram showed no residual stones                          seen  Recommendation:        - [facility]                         - No need for further follow up with the GI team                         - Will proceed with PEG-J exchange                                                                                     Electronically signed by RAJ Arroyo  ______ __________________  MERY ARROYO MD  10/10/2022 6:59:43 PM  I was physically present for the entire viewing portion of the exam.  __________________________  Signature of teaching physician  Shelby/[id number] ARROYO MD    __________________  ROLANDO RENAE,   Number of Addenda: 0    Note Initiated On: 10/7/2022 7:44 AM  Scope In:  Scope Out:     Glucose by meter    Collection Time: 10/07/22  9:51 AM   Result Value Ref Range    GLUCOSE BY METER POCT 119 (H) 70 - 99 mg/dL   [facility] EXAM    Collection Time: 10/07/22  9:53 AM   Result Value Ref Range    [facility]       [address] [place] [address] [phone]     Endoscopy Department  _______________________________________________________________________________  Patient Name: Sofie Rodriguez            Procedure Date: 10/7/2022 9:53 AM  MRN: [id number]                       Account Number: [id number]  YOB: 1960               Admit Type: Inpatient  Age: 62                               Room:  OR 18  Gender: Female                        Note Status: Finalized  Attending MD: MERY ARROYO MD Pause for the Cause: Completed with Time   Out  Total Sedation Time:                    _______________________________________________________________________________     Procedure:             Peg tube  placement  Indications:           Sofie Nichols a 62 year old female with a PMHx of                          COPD s/p bilateral lung transplant (6/28/22)                          complicated by acute limb ischemia of LUE requiring                           left radial thrombectomy, EBV viremia and C.diff                          (vancomycin 7/12/22-7/28/22). 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 She                          was admitted for melena back in June and found to have                          hemobilia. She also had elevated LFTs during the same                          admission and MRCP showed mild dilation of                          intrahepatic and extrahepatic biliary ducts and                          gallbladder with sludge extending to the bladder neck.                          She underwent an ERCP which showed hemobilia. A                          biliary sphincterotomy was performed and a                          tr anspabillary [place] Viabil [id number] was placed in the                          common bile duct. A 4 Fr by 11 cm temporary Arroyo                          pancreatic was placed in the PD. She since had                          cholecystectomy. LFTs are have been normal since. She                          presents for an ERCP for stent removal. Patient's -                          PEG-J retracted into the stomach during the ERCP                          procedure and was successfully replaced after the ERCP                          procedure  Providers:             MERY ARROYO MD, ROLANDO Edwards MD:          MONSERRAT CANALES  Requesting Provider:   MONSERRAT CANALES  Medicines:              General Anesthesia  Complications:         No immediate complications.  _______________________________________________________________________________  Procedure:             Pre-Anesthesia Assessment:                         - Prior to the procedure, a History and Physical was                           performed, and patient medications and allergies were                          reviewed. The patient is competent. The risks and                          benefits of the procedure and the sedation options and                          risks were discussed with the patient. All questions                          were answered and informed consent was obtained.                          Patient identification and proposed procedure were                          verified by the physician, the nurse and the                          anesthesiologist in the procedure room. Mental Status                          Examination: alert and oriented. Airway Examination:                          normal oropharyngeal airway and neck mobility.                          Respiratory Examination: clear to auscultation. CV                          Examination: normal. Prophylactic Antibiotics: The                          patient does not require prophylactic antibiotics.                           Prior Anticoagulants: The patient has taken no                          anticoagulant or antiplatelet agents. ASA Grade                          Assessment: III - A patient with severe systemic                          disease. After reviewing the risks and benefits, the                          patient was deemed in satisfactory condition to                          undergo the procedure. The anesthesia plan was to use                          general anesthesia. Immediately prior to                          administration of medications, the patient was                          re-assessed for adequacy to receive sedatives. The                           heart rate, respiratory rate, oxygen saturations,                          blood pressure, adequacy of pulmonary ventilation, and                          response to care were monitored throughout the                          procedure. The physical status of the patient was                          re-assessed after the pro cedure.                         After obtaining informed consent, the endoscope was                          passed under direct vision. Throughout the procedure,                          the patient's blood pressure, pulse, and oxygen                          saturations were monitored continuously. The was                          introduced through the mouth, and advanced to the                          third part of duodenum. The upper GI endoscopy was                          accomplished without difficulty. The patient tolerated                          the procedure well.                                                                                   Findings:       The examined esophagus was normal.       There was evidence of a gastrostomy present in the gastric antrum. Prior        PEG-J coiled in the stomach       PEG-J exchange:       The patient was placed in the supine position. A glidewire was advanced        across the gastrostomy alongside the gastrojejunal tube into  the        stomach. The prevoiusly placed gastrojejujunal feeding tube was removed.        A pediatric upper endoscope was advanced through gastrostomy tract to        the proximal jejunum. A 0.035 inch Glide wire was advanced into the        jejunum and the endoscope removed. An 18 Fr by 45 cm gastrojejunal        feeding tube was advanced into the jejunum over the wire and position        confirmed fluoroscopically. The internal balloon was inflated with water        and a small amount of contrast. The external bumper was cinched to 3 cm        at the skin. Patient was then intubated with a  pediatric upper endoscope        to confirm location of the balloon which was noted to be in the correct        location.                                                                                   Impression:            - Normal esophagus.                         - PEG-J successfully replaced as above  Recommendation:        - Return patient to [facility] for [facility] care.                          - Ok to use the new PEG-J for feeding per prior tube                          feeds                                                                                     Electronically signed by RAJ Arroyo  ________________________  MERY ARROYO MD  10/10/2022 7:00:15 PM  I was physically present for the entire viewing portion of the exam.  __________________________  Signature of teaching physician  [id number] ARROYO MD    __________________  ROLANDO RENAE,   Number of Addenda: 0    Note Initiated On: 10/7/2022 9:53 AM  Scope In:  Scope Out:     Glucose by meter    Collection Time: 10/07/22  1:56 PM   Result Value Ref Range    GLUCOSE BY METER POCT 175 (H) 70 - 99 mg/dL   Glucose by meter    Collection Time: 10/07/22  4:46 PM   Result Value Ref Range    GLUCOSE BY METER POCT 159 (H) 70 - 99 mg/dL   Glucose by meter    Collection Time: 10/07/22  9:40 PM   Result Value Ref Range    GLUCOSE BY METER POCT 224 (H) 70 - 99 mg/dL   Glucose by meter    Collection Time: 10/08/22  3:14 AM   Result Value Ref Range    GLUCOSE BY METER POCT 173 (H) 70 - 99 mg/dL   Comprehensive metabolic panel    Collection Time: 10/08/22  7:46 AM   Result Value Ref Range    Sodium 135 (L) 136 - 145 mmol/L    Potassium 4.6 3.4 - 5.3 mmol/L    Chloride 93 (L) 98 - 107 mmol/L    Carbon Dioxide (CO2) 32 (H) 22 - 29 mmol/L    Anion Gap 10 7 - 15 mmol/L    Urea Nitrogen 56.9 (H) 8.0 - 23.0 mg/dL    Creatinine 3.22 (H) 0.51 - 0.95 mg/dL    Calcium 9.6 8.8 - 10.2 mg/dL    Glucose 106 (H) 70 - 99 mg/dL    Alkaline Phosphatase 78 35 -  104 U/L    AST 21 10 - 35 U/L    ALT 10 10 - 35 U/L    Protein Total 5.5 (L) 6.4 - 8.3 g/dL    Albumin 3.1 (L) 3.5 - 5.2 g/dL    Bilirubin Total 0.2 <=1.2 mg/dL    GFR Estimate 16 (L) >60 mL/min/1.73m2   CBC with platelets    Collection Time: 10/08/22  7:46 AM   Result Value Ref Range    WBC Count 7.2 4.0 - 11.0 10e3/uL    RBC Count 2.83 (L) 3.80 - 5.20 10e6/uL    Hemoglobin 9.3 (L) 11.7 - 15.7 g/dL    Hematocrit 30.0 (L) 35.0 - 47.0 %     (H) 78 - 100 fL    MCH 32.9 26.5 - 33.0 pg    MCHC 31.0 (L) 31.5 - 36.5 g/dL    RDW 19.9 (H) 10.0 - 15.0 %    Platelet Count 210 150 - 450 10e3/uL   Glucose by meter    Collection Time: 10/08/22 11:07 AM   Result Value Ref Range    GLUCOSE BY METER POCT 162 (H) 70 - 99 mg/dL   General PFT Lab (Please always keep checked)    Collection Time: 10/12/22  8:48 AM   Result Value Ref Range    FVC-Pred 3.06 L    FVC-Pre 1.31 L    FVC-%Pred-Pre 42 %    FEV1-Pre 1.23 L    FEV1-%Pred-Pre 50 %    FEV1FVC-Pred 79 %    FEV1FVC-Pre 93 %    FEFMax-Pred 6.14 L/sec    FEFMax-Pre 4.50 L/sec    FEFMax-%Pred-Pre 73 %    FEF2575-Pred 2.22 L/sec    FEF2575-Pre 2.26 L/sec    KXE4203-%Pred-Pre 101 %    ExpTime-Pre 4.79 sec    FIFMax-Pre 3.69 L/sec    FEV1FEV6-Pred 81 %    FEV1FEV6-Pre 94 %   CBC with platelets    Collection Time: 10/12/22  9:44 AM   Result Value Ref Range    WBC Count 8.3 4.0 - 11.0 10e3/uL    RBC Count 2.85 (L) 3.80 - 5.20 10e6/uL    Hemoglobin 9.4 (L) 11.7 - 15.7 g/dL    Hematocrit 29.1 (L) 35.0 - 47.0 %     (H) 78 - 100 fL    MCH 33.0 26.5 - 33.0 pg    MCHC 32.3 31.5 - 36.5 g/dL    RDW 18.6 (H) 10.0 - 15.0 %    Platelet Count 189 150 - 450 10e3/uL   Magnesium    Collection Time: 10/12/22  9:44 AM   Result Value Ref Range    Magnesium 2.0 1.7 - 2.3 mg/dL   Basic metabolic panel    Collection Time: 10/12/22  9:44 AM   Result Value Ref Range    Sodium 132 (L) 136 - 145 mmol/L    Potassium 3.8 3.4 - 5.3 mmol/L    Chloride 92 (L) 98 - 107 mmol/L    Carbon Dioxide (CO2) 29 22 -  29 mmol/L    Anion Gap 11 7 - 15 mmol/L    Urea Nitrogen 47.3 (H) 8.0 - 23.0 mg/dL    Creatinine 2.10 (H) 0.51 - 0.95 mg/dL    Calcium 9.3 8.8 - 10.2 mg/dL    Glucose 120 (H) 70 - 99 mg/dL    GFR Estimate 26 (L) >60 mL/min/1.73m2     PFT interpretation:  Maneuver: valid, but did not meet ATS guidelines      Again, thank you for allowing me to participate in the care of your patient.        Sincerely,        Kaylin Triana PA-C     (3) slightly limited

## 2022-10-12 NOTE — PROGRESS NOTES
This is a recent snapshot of the patient's [facility] medical record.  For current drug dose and complete information and questions, call [phone] or In Basket pool, [facility] ([id number])  CSN Number:  [id number]

## 2022-10-12 NOTE — PROGRESS NOTES
[facility]  October 12, 2022         Assessment and Plan:   Sofie Rodriguez is a 62 y.o female s/p BSLT on 6/28/22 for COPD complicated by persistent bilateral pleural effusions, persistent CO2 retention, right hemidiaphragm palsy, BACILIO (dialysis 7/14-7/16), C. diff colitis, gastroparesis s/p GJ tube placement (7/27/22), GI bleed 2/2 pyloric ulcer, hemobilia s/p ERCP and MRCP, and persistent vasoplegia. Other history notable for HFpEF, NTM colonoization, hepatitis C, and methamphetamine use. Admitted 9/9-10/8 with persistent dyspnea, daily morning hemoptysis, and increased BLE edema. Also noted to have persistent, increased bilateral pleural effusions, diffuse bilateral groundglass changes, and more focal appearing LLL infiltrates on imaging. Treating with aggressive diuresis with some improvement in symptoms and hypoxia. Mild intermittent hypoxia and ANAYA persisted. S/p bilateral chest tubes. S/p tunneled line placed and iHD initiation (9/26). This is her first post discharge follow up.    1. S/p bilateral sequential lung transplant:  Acute hypoxia with chronic hypercapnia:   Pulmonary edema:  Persistent bibasilar pleural effusions:   Right hemidiaphragm palsy: Chest CT 9/8 with increased effusions and groundglass changes. DSA positive but stable. BNP markedly elevated 30k and also with increased BLE edema. Aggressively diuresed with some improvement in symptoms. Bronch 9/13 unremarkable, BAL of lingula sent, cultures no growth. S/p right pigtail chest tube placement 9/13-9/22, transudative, cultures negative. S/p aspiration of left pleural effusion 9/13 and then pigtail chest tube 9/15-9/29 s/p full lytic course 9/16-9/19, cultures also negative. Chest CT 9/27 with grossly unchanged small bilateral hydroPTX, continued resolution of nodular opacity in posterior MARLON, and the lobes of the left upper and lower are rotated with respect to each other (discussed at  transplant conference 9/29, no intervention). Weaned to RA at rest on 9/24, using 1L NC overnight.  VBG with improved hypercapnia as of 9/29.  Overnight oximetry (10/3) incomplete given need for 1L NC with desaturation (unfortunately not reordered prior to discharge). CMV 10/5 negative. No new pulmonary complaints today and in fact, walked to the [facility] from the [facility] for the first time. Sating 98% on room air. CXR reviewed today and demonstrates stable effusions L>R.   - Supplemental O2 of 1 L overnight, will repeat overnight O2 study on room air in a month or so     Immunosuppression:  - Tacrolimus 4 mg BID.  Goal level 8-12  - Azathioprine 100 mg daily (9/20, MMF stopped due to ongoing diarrhea)  - Prednisone 10 mg qAM / 7.5 mg qPM with next taper due 10/13    Date AM dose (mg) PM dose (mg)   9/15/22 10 7.5   10/13/22 7.5 7.5   11/10/22 7.5 5   12/8/22 5 5   1/5/23 5 2.5      Prophylaxis:   - Dapsone qMWF for PJP ppx (resumed 9/19, monitor for worsening anemia; Bactrim stopped d/t hyperkalemia, will need to monitor for recurrence of anemia with dapsone; Pentamidine neb not tolerated on 9/7 after only 5 minutes d/t excessive coughing)     2. 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  - Monitoring m4tdblr, next due 10/19    3. EBV viremia: level of 80 (log 2.9) on 10/5.   - EBV with next vist     4. Hypogammaglobulinemia: IgG adequate at time of transplant, repeat level low (336) on 7/28.  S/p IVIG 7/30, tolerated well.  IgG on 9/29 low at 559 but not indicating IVIG replacement.   - IgG with next visit    5. Encephalopathy, Resolved:  Tremors:   Presumed UTI: noted 9/19 with confusion as well as tremor. VBG with worsening hypercapnia (99).  BUN elevated (112). UA with moderate blood, large leukocyte esterase, but UC (9/19) negative. Tacro not supratherapeutic. Head CT (9/20) without acute intracranial pathology.  AMS resolved ~9/22.  S/p ceftriaxone 5-day  course.     6. ESRD based upon Cystatin C (GFR of 10): Nephrology consulted 9/21, see their note for details, with concern for CKD5 as Cr may suggest overestimation of kidney function with limited muscle mass. Renal US (9/22) normal. Making a little urine. S/p tunneled line placed and HD initiated 9/26 on T/Th/Sat schedule.     7. Diarrhea: C diff negative on 9/10 and 9/20. Likely medication related (MMF), but unable to transition to Myfortic given NPO. Loperamide utilized with some benefit.  Enteric stool panel negative 9/16.  Transitioned from MMF to AZA as above. Stools still loose, maybe a little improved.  - Increase fiber to TID  - Imodium 1 tablet PRN, currently using 2 times/day     8. Severe gastroparesis:   Pyloric ulcer: gastric emptying study 7/20 with severe gastric emptying delay (95% retention at 4 hours), s/p GJ tube placement in IR 7/27.  S/p EGD (8/11) with mild erythema 2/2 GJ tube trauma seen near insertion site but no active bleeding. Repeat gastric emptying study 9/30 unfortunately with persistent severe retention (91% at 4h) and some fullness and nausea post study; defer adjustments to current plan and consider semi-permanent status as is. S/p EGD (may not have been performed as requested, not documented) and ERCP 10/7 with removal of stents and sludge; PEG/J tube replaced at that time.   - PPI BID  - TF cycled 18 hours; and ALL enteral medications via J tube  - G tube to gravity drainage overnight and prn during the day with GI symptoms (N/V, bloating, reflux); full liquid diet for comfort only  - Will need GI follow up with repeat gastric emptying study in a few months    RTC: 2 weeks; plan for bronch mid November  Vaccinations: flu shot today; Evusheld > February 2023; will need bivalent covid vaccine  Annual dermatology visit: need to address    Kaylin Triana PA-C  Pulmonary, Allergy, Critical Care and Sleep Medicine        Interval History:     Made it to pulm rehab yesterday, but going  forward will only do Fridays. Walked over from the [facility] today, which is the first time she has done that. Using 1 L O2 at night. No fever or chills, no sinus or nasal congestion, mild runny, no new or worsening shortness of breath. Minimal cough, no new chest , does have some numbness under both arm pits. No nausea or vomiting, no bloating, stools are loose, maybe slightly better.           Review of Systems:   Please see HPI, otherwise the complete 10 point ROS is negative.           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     Hepatitis 2017    Hep C, [facility]     HTN (hypertension)      Osteopenia      Past Surgical History:   Procedure Laterality Date     BRONCHOSCOPY (RIGID OR FLEXIBLE), DIAGNOSTIC N/A 8/2/2022    Procedure: BRONCHOSCOPY, DIAGNOSTIC- inspection Bronch;  Surgeon: Kamala Lovell MD;  Location:  GI     BRONCHOSCOPY (RIGID OR FLEXIBLE), DIAGNOSTIC N/A 9/13/2022    Procedure: INSPECTION BRONCHOSCOPY, WITH BRONCHOALVEOLAR LAVAGE;  Surgeon: Jose R Mccullough MD;  Location:  GI     BRONCHOSCOPY FLEXIBLE AND RIGID N/A 07/19/2022    Procedure: BRONCHOSCOPY inspection only;  Surgeon: Bob Liao MD;  Location: [facility]     COLONOSCOPY  2015     CV CORONARY ANGIOGRAM N/A 06/30/2021    Procedure: CV CORONARY ANGIOGRAM;  Surgeon: Alexander Cuellar MD;  Location:  HEART CARDIAC CATH LAB     CV RIGHT HEART CATH MEASUREMENTS RECORDED N/A 06/30/2021    Procedure: CV RIGHT HEART CATH;  Surgeon: Alexander Cuellar MD;  Location:  HEART CARDIAC CATH LAB     ENDOSCOPIC RETROGRADE CHOLANGIOPANCREATOGRAM N/A 8/11/2022    Procedure: ENDOSCOPIC RETROGRADE CHOLANGIOPANCREATOGRAPHY WITH PANCREATIC DUCT NEEDLE KNIFE AND STENT PLACEMENT, BILE DUCT SPHINCTEROTOMY, BLOOD/DEBRIS REMOVAL AND STENT  PLACEMENT;  Surgeon: Cosmo Arroyo MD;  Location: [facility] OR     ENDOSCOPIC RETROGRADE CHOLANGIOPANCREATOGRAM N/A 10/7/2022    Procedure: ENDOSCOPIC RETROGRADE CHOLANGIOPANCREATOGRAPHY with biliary and pancreatic stent removal, debris removal;  Surgeon: Cosmo Arroyo MD;  Location: [facility] OR     ENT SURGERY  1974    tonsillectomy     ENTEROSCOPY SMALL BOWEL N/A 8/11/2022    Procedure: SMALL BOWEL ENTEROSCOPY;  Surgeon: Cosmo Arroyo MD;  Location: [facility] OR     ESOPHAGOGASTRODUODENOSCOPY, WITH NASOGASTRIC TUBE INSERTION N/A 07/01/2022    Procedure: ESOPHAGOGASTRODUODENOSCOPY, WITH NASOJEJUNAL TUBE INSERTION;  Surgeon: Ozzy Nickerson MD;  Location:  GI     ESOPHAGOSCOPY, GASTROSCOPY, DUODENOSCOPY (EGD), COMBINED N/A 8/3/2022    Procedure: ESOPHAGOGASTRODUODENOSCOPY (EGD);  Surgeon: Ira Andres MD;  Location:  GI     HAND SURGERY       INSERT CHEST TUBE Right 9/13/2022    Procedure: Insert chest tube;  Surgeon: Jose R Mccullough MD;  Location:  GI     IR CVC TUNNEL PLACEMENT > 5 YRS OF AGE  9/26/2022     IR GASTRO JEJUNOSTOMY TUBE CHANGE  8/31/2022     IR GASTRO JEJUNOSTOMY TUBE PLACEMENT  7/27/2022     IR THORACENTESIS  8/29/2022     LEEP TX, CERVICAL  04/07/2017    HECTOR III     LYMPH NODE BIOPSY Left 2005    Left axilla, benign- [place]     MIDLINE INSERTION - DOUBLE LUMEN Left 07/28/2022    20cm, Basilic vein     REPLACE GASTROJEJUNOSTOMY TUBE, PERCUTANEOUS  10/7/2022    Procedure: Replace Gastrojejunostomy Tube;  Surgeon: Cosmo Arroyo MD;  Location: [facility] OR     THORACENTESIS Left 8/29/2022    Procedure: THORACENTESIS;  Surgeon: Bo Capone PA-C;  Location: [facility] OR     THORACENTESIS Left 9/13/2022    Procedure: Thoracentesis;  Surgeon: Jose R Mccullough MD;  Location:  GI     THROMBECTOMY UPPER EXTREMITY Left 07/02/2022    Procedure: LEFT RADIAL ARM THROMBECTOMY;  Surgeon: Christie Graham MD;  Location:  OR     TRANSPLANT LUNG RECIPIENT  SINGLE X2 Bilateral 2022    Procedure: Clamshell Incision, Bilateral Sequential Lung Transplant, On Cardiopulmonary Bypass, Flexible Bronchoscopy;  Surgeon: Sue Sunshine MD;  Location:  OR           Family History:     No family history on file.         Social History:     Social History     Socioeconomic History     Marital status: Single     Spouse name: Not on file     Number of children: Not on file     Years of education: Not on file     Highest education level: Not on file   Occupational History     Not on file   Tobacco Use     Smoking status: Former     Years: 30.00     Types: Cigarettes     Quit date: 2020     Years since quittin.9     Smokeless tobacco: Never   Substance and Sexual Activity     Alcohol use: Not Currently     Drug use: Not Currently     Types: Marijuana, Methamphetamines     Comment: hx:marijuana and methamphetamine-quit both unsure ?  2-3 yrs ago     Sexual activity: Not on file   Other Topics Concern     Parent/sibling w/ CABG, MI or angioplasty before 65F 55M? Not Asked   Social History Narrative     Not on file     Social Determinants of Health     Financial Resource Strain: Not on file   Food Insecurity: Not on file   Transportation Needs: Not on file   Physical Activity: Not on file   Stress: Not on file   Social Connections: Not on file   Intimate Partner Violence: Not on file   Housing Stability: Not on file            Medications:     Current Outpatient Medications   Medication     acetaminophen (TYLENOL) 160 MG/5ML liquid     alcohol swab prep pads     azaTHIOprine (IMURAN) 5 mg/mL SUSP     B and C vitamin Complex with folic acid (NEPHRONEX) 0.9 MG/5ML LIQD liquid     blood glucose (CONTOUR NEXT TEST) test strip     blood glucose (NO BRAND SPECIFIED) lancets standard     blood glucose monitoring (CONTOUR NEXT MONITOR W/DEVICE KIT) meter device kit     calcium carbonate 600 mg-vitamin D 400 units (CALTRATE) 600-400 MG-UNIT per tablet     cyanocobalamin  (CYANOCOBALAMIN) 500 MCG tablet     dapsone 2 mg/mL SUSP     folic acid (FOLVITE) 1 MG tablet     gabapentin (NEURONTIN) 250 MG/5ML solution     Guar Gum (FIBER MODULAR, NUTRISOURCE FIBER,) packet     insulin aspart (NOVOLOG PEN) 100 UNIT/ML pen     insulin pen needle (31G X 5 MM) 31G X 5 MM miscellaneous     loperamide (IMODIUM A-D) 2 MG tablet     metoprolol tartrate (LOPRESSOR) 50 MG tablet     Nutritional Supplements (GLUCOSE MANAGEMENT) TABS     nystatin (MYCOSTATIN) 559635 UNIT/ML suspension     ondansetron (ZOFRAN ODT) 4 MG ODT tab     oxyCODONE (ROXICODONE) 5 MG tablet     pantoprazole (PROTONIX) 2 mg/mL SUSP suspension     predniSONE (DELTASONE) 5 MG tablet     protein modular (PROSOURCE TF) LIQD     Sharps Container MISC     tacrolimus (GENERIC EQUIVALENT) 1 mg/mL suspension     traZODone (DESYREL) 50 MG tablet     No current facility-administered medications for this visit.            Physical Exam:   BP (!) 148/77   Pulse 87   SpO2 98%     GENERAL: alert, NAD  HEENT: NCAT, EOMI, no scleral icterus, oral mucosa moist and without lesions  Neck: no cervical or supraclavicular adenopathy  Lungs: good air flow, few scattered  CV: RRR, S1S2, + murmur  Abdomen: normoactive BS, soft, non tender  Lymph: no edema  Neuro: AAO X 3, CN 2-12 grossly intact  Psychiatric: normal affect, good eye contact  Skin: no rash, jaundice or lesions on limited exam         Data:   All laboratory and imaging data reviewed.      Recent Results (from the past 168 hour(s))   Glucose by meter    Collection Time: 10/05/22  3:44 PM   Result Value Ref Range    GLUCOSE BY METER POCT 130 (H) 70 - 99 mg/dL   Glucose by meter    Collection Time: 10/05/22 10:01 PM   Result Value Ref Range    GLUCOSE BY METER POCT 175 (H) 70 - 99 mg/dL   Comprehensive metabolic panel    Collection Time: 10/06/22  5:44 AM   Result Value Ref Range    Sodium 132 (L) 136 - 145 mmol/L    Potassium 3.9 3.4 - 5.3 mmol/L    Chloride 93 (L) 98 - 107 mmol/L    Carbon  Dioxide (CO2) 34 (H) 22 - 29 mmol/L    Anion Gap 5 (L) 7 - 15 mmol/L    Urea Nitrogen 27.3 (H) 8.0 - 23.0 mg/dL    Creatinine 2.06 (H) 0.51 - 0.95 mg/dL    Calcium 9.0 8.8 - 10.2 mg/dL    Glucose 133 (H) 70 - 99 mg/dL    Alkaline Phosphatase 92 35 - 104 U/L    AST 21 10 - 35 U/L    ALT 11 10 - 35 U/L    Protein Total 5.4 (L) 6.4 - 8.3 g/dL    Albumin 3.2 (L) 3.5 - 5.2 g/dL    Bilirubin Total 0.3 <=1.2 mg/dL    GFR Estimate 27 (L) >60 mL/min/1.73m2   Tacrolimus by Tandem Mass Spectrometry    Collection Time: 10/06/22  5:44 AM   Result Value Ref Range    Tacrolimus by Tandem Mass Spectrometry 9.8 5.0 - 15.0 ug/L    Tacrolimus Last Dose Date      Tacrolimus Last Dose Time     Glucose by meter    Collection Time: 10/06/22  5:44 AM   Result Value Ref Range    GLUCOSE BY METER POCT 144 (H) 70 - 99 mg/dL   Lipase    Collection Time: 10/06/22  5:44 AM   Result Value Ref Range    Lipase 17 13 - 60 U/L   CBC with platelets    Collection Time: 10/06/22  8:11 AM   Result Value Ref Range    WBC Count 7.9 4.0 - 11.0 10e3/uL    RBC Count 2.80 (L) 3.80 - 5.20 10e6/uL    Hemoglobin 9.3 (L) 11.7 - 15.7 g/dL    Hematocrit 29.8 (L) 35.0 - 47.0 %     (H) 78 - 100 fL    MCH 33.2 (H) 26.5 - 33.0 pg    MCHC 31.2 (L) 31.5 - 36.5 g/dL    RDW 19.8 (H) 10.0 - 15.0 %    Platelet Count 214 150 - 450 10e3/uL   Glucose by meter    Collection Time: 10/06/22 10:51 AM   Result Value Ref Range    GLUCOSE BY METER POCT 164 (H) 70 - 99 mg/dL   Glucose by meter    Collection Time: 10/06/22  2:54 PM   Result Value Ref Range    GLUCOSE BY METER POCT 147 (H) 70 - 99 mg/dL   Glucose by meter    Collection Time: 10/06/22  9:20 PM   Result Value Ref Range    GLUCOSE BY METER POCT 154 (H) 70 - 99 mg/dL   Glucose by meter    Collection Time: 10/07/22  5:42 AM   Result Value Ref Range    GLUCOSE BY METER POCT 117 (H) 70 - 99 mg/dL   Comprehensive metabolic panel    Collection Time: 10/07/22  5:48 AM   Result Value Ref Range    Sodium 137 136 - 145 mmol/L     Potassium 3.9 3.4 - 5.3 mmol/L    Chloride 95 (L) 98 - 107 mmol/L    Carbon Dioxide (CO2) 31 (H) 22 - 29 mmol/L    Anion Gap 11 7 - 15 mmol/L    Urea Nitrogen 34.4 (H) 8.0 - 23.0 mg/dL    Creatinine 2.40 (H) 0.51 - 0.95 mg/dL    Calcium 10.0 8.8 - 10.2 mg/dL    Glucose 106 (H) 70 - 99 mg/dL    Alkaline Phosphatase 88 35 - 104 U/L    AST 20 10 - 35 U/L    ALT 15 10 - 35 U/L    Protein Total 6.5 6.4 - 8.3 g/dL    Albumin 3.8 3.5 - 5.2 g/dL    Bilirubin Total 0.4 <=1.2 mg/dL    GFR Estimate 22 (L) >60 mL/min/1.73m2   CBC with platelets    Collection Time: 10/07/22  5:48 AM   Result Value Ref Range    WBC Count 6.8 4.0 - 11.0 10e3/uL    RBC Count 3.10 (L) 3.80 - 5.20 10e6/uL    Hemoglobin 10.2 (L) 11.7 - 15.7 g/dL    Hematocrit 33.3 (L) 35.0 - 47.0 %     (H) 78 - 100 fL    MCH 32.9 26.5 - 33.0 pg    MCHC 30.6 (L) 31.5 - 36.5 g/dL    RDW 20.0 (H) 10.0 - 15.0 %    Platelet Count 255 150 - 450 10e3/uL   CBC with platelets    Collection Time: 10/07/22  6:27 AM   Result Value Ref Range    WBC Count 6.7 4.0 - 11.0 10e3/uL    RBC Count 2.89 (L) 3.80 - 5.20 10e6/uL    Hemoglobin 9.5 (L) 11.7 - 15.7 g/dL    Hematocrit 30.6 (L) 35.0 - 47.0 %     (H) 78 - 100 fL    MCH 32.9 26.5 - 33.0 pg    MCHC 31.0 (L) 31.5 - 36.5 g/dL    RDW 19.9 (H) 10.0 - 15.0 %    Platelet Count 223 150 - 450 10e3/uL   Comprehensive metabolic panel    Collection Time: 10/07/22  6:27 AM   Result Value Ref Range    Sodium 135 (L) 136 - 145 mmol/L    Potassium 4.3 3.4 - 5.3 mmol/L    Chloride 96 (L) 98 - 107 mmol/L    Carbon Dioxide (CO2) 28 22 - 29 mmol/L    Anion Gap 11 7 - 15 mmol/L    Urea Nitrogen 35.3 (H) 8.0 - 23.0 mg/dL    Creatinine 2.48 (H) 0.51 - 0.95 mg/dL    Calcium 9.8 8.8 - 10.2 mg/dL    Glucose 111 (H) 70 - 99 mg/dL    Alkaline Phosphatase 76 35 - 104 U/L    AST 19 10 - 35 U/L    ALT 6 (L) 10 - 35 U/L    Protein Total 5.8 (L) 6.4 - 8.3 g/dL    Albumin 3.4 (L) 3.5 - 5.2 g/dL    Bilirubin Total 0.4 <=1.2 mg/dL    GFR Estimate 22  (L) >60 mL/min/1.73m2   INR    Collection Time: 10/07/22  6:27 AM   Result Value Ref Range    INR 0.92 0.85 - 1.15   Amylase    Collection Time: 10/07/22  6:27 AM   Result Value Ref Range    Amylase 42 28 - 100 U/L   Lipase    Collection Time: 10/07/22  6:27 AM   Result Value Ref Range    Lipase 13 13 - 60 U/L   ENDOSCOPIC RETROGRADE CHOLANGIOPANCREATOGRAPHY    Collection Time: 10/07/22  7:44 AM   Result Value Ref Range    ERCP       [address] [place] [address] [phone]     Endoscopy Department  _______________________________________________________________________________  Patient Name: Sofie Rodriguez            Procedure Date: 10/7/2022 7:44 AM  MRN: [id number]                       Account Number: [id number]  YOB: 1960               Admit Type: Inpatient  Age: 62                               Room:  OR   Gender: Female                        Note Status: Finalized  Attending MD: MERY FINE MD Pause for the Cause: Completed with Time   Out  Total Sedation Time:                    _______________________________________________________________________________     Procedure:             ERCP  Indications:           Sofie Nichols a 62 year old female with a PMHx of                          COPD s/p bilateral lung transplant (6/28/22)                          complicated by acute limb ischemia of LUE requiring                           left radial thrombectomy, EBV viremia and C.diff                          (vancomycin 7/12/22-7/28/22). 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 She                          was admitted for melena back in June and found  to have                          hemobilia. She also had elevated LFTs during the same                          admission and MRCP showed mild dilation of                          intrahepatic and extrahepatic biliary ducts and                          gallbladder with sludge extending to the bladder neck.                          She underwent an ERCP which showed hemobilia. A                          biliary sphincterotomy was performed and a                          transpabillary G ore Viabil [id number] was placed in the                          common bile duct. A 4 Fr by 11 cm temporary Arroyo                          pancreatic was placed in the PD. She since had                          cholecystectomy. LFTs are have been normal since. She                          presents for an ERCP for stent removal.  Providers:             MERY ARROYO MD, ROLANDO Edwards MD:          AMADOR BROWN MD  Requesting Provider:   MONSERRAT CANALES, VALERIE DOWELL MD  Medicines:             General Anesthesia, Levaquin 500 mg IV  Complications:         No immediate complications.  _______________________________________________________________________________  Procedure:             Pre-Anesthesia Assessment:                         - Prior to the procedure, a History and Physical was                          performed, and patient medications and allergies were                          reviewed. The patient is competent. The risks and                           benefits of the procedure and the sedation options and                          risks were discussed with the patient. All questions                          were answered and informed consent was obtained.                          Patient identification and proposed procedure were                          verified by the physician, the nurse and the                          anesthesiologist in the procedure room. Mental Status                           Examination: normal. Airway Examination: normal                          oropharyngeal airway and neck mobility. Respiratory                          Examination: clear to auscultation. CV Examination:                          normal. Prophylactic Antibiotics: The patient requires                          prophylactic antibiotics as clinically indicated based                          on published guidelines for the planned ERCP. The                          patient received antibiotic therapy before the                           procedure. Prior Anticoagulants: The patient has taken                          no anticoagulant or antiplatelet agents. ASA Grade                          Assessment: II - A patient with mild systemic disease.                          After reviewing the risks and benefits, the patient                          was deemed in satisfactory condition to undergo the                          procedure. The anesthesia plan was to use general                          anesthesia. Immediately prior to administration of                          medications, the patient was re-assessed for adequacy                          to receive sedatives. The heart rate, respiratory                          rate, oxygen saturations, blood pressure, adequacy of                          pulmonary ventilation, and response to care were                          monitored throughout the procedure. The physical                          status of the patient was re-assessed after the                          proce dure.                         After obtaining informed consent, the scope was passed                          under direct vision. Throughout the procedure, the                          patient's blood pressure, pulse, and oxygen                          saturations were monitored continuously. The                          Duodenoscope was introduced through the mouth, and                          used  to inject contrast into and used to inject                          contrast into the bile duct. The ERCP was accomplished                          without difficulty. The patient tolerated the                          procedure well.                                                                                   Findings:       A biliary stent was visible on the  film. The esophagus was        successfully intubated under direct vision. The scope was advanced to a        normal major papilla in the descending duodenum without detailed        examination of the pharynx, larynx and  associated structures, and upper        GI tract. The upper GI tract was grossly normal. One covered metal stent        originating in the biliary tree was emerging from the major papilla. The        stent was visibly patent. One temporary plastic stent originating in the        pancreatic duct was emerging from the major papilla. The stent was        visibly patent. One stent was removed from the biliary tree using a        snare. One stent was removed from the pancreatic duct using a snare. A        biliary sphincterotomy had been performed. The sphincterotomy appeared        open. The bile duct was deeply cannulated with the short-nosed traction        sphincterotome in concert with a 0.025 visiglide, short angled. Contrast        was injected. I personally interpreted the bile duct images. Ductal flow        of contrast was adequate. Image quality was excellent. Opacification of        the left intrahepatic branches and right intrahepatic branches was        successful. The biliar y tree was swept with a 12 mm balloon starting at        the bifurcation. Sludge was swept from the duct.                                                                                   Impression:            - Prior biliary and pancreatic stent were removed with                          a snare                         - Prior biliary sphincterotomy  was patent and                          selective biliary cannulation was performed                         - The common bile duct was swept with a balloon with                          extraction of sludge                         - Occlusion cholangiogram showed no residual stones                          seen  Recommendation:        - [facility]                         - No need for further follow up with the GI team                         - Will proceed with PEG-J exchange                                                                                     Electronically signed by RAJ Arroyo  ______ __________________  MERY ARROYO MD  10/10/2022 6:59:43 PM  I was physically present for the entire viewing portion of the exam.  __________________________  Signature of teaching physician  Shelby/Demario ARROYO MD    __________________  ROLANDO RENAE,   Number of Addenda: 0    Note Initiated On: 10/7/2022 7:44 AM  Scope In:  Scope Out:     Glucose by meter    Collection Time: 10/07/22  9:51 AM   Result Value Ref Range    GLUCOSE BY METER POCT 119 (H) 70 - 99 mg/dL   [facility] - [facility] EXAM    Collection Time: 10/07/22  9:53 AM   Result Value Ref Range    [facility]       [address] [place] [address] [phone]     Endoscopy Department  _______________________________________________________________________________  Patient Name: Sofie Rodriguez            Procedure Date: 10/7/2022 9:53 AM  MRN: [id number]                       Account Number: [id number]  YOB: 1960               Admit Type: Inpatient  Age: 62                               Room:  OR 18  Gender: Female                        Note Status: Finalized  Attending MD: MERY ARROYO MD Pause for the Cause: Completed with Time   Out  Total Sedation Time:                     _______________________________________________________________________________     Procedure:             Peg tube placement  Indications:           Sofie Nichols a 62 year old female with a PMHx of                          COPD s/p bilateral lung transplant (6/28/22)                          complicated by acute limb ischemia of LUE requiring                           left radial thrombectomy, EBV viremia and C.diff                          (vancomycin 7/12/22-7/28/22). 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 She                          was admitted for melena back in June and found to have                          hemobilia. She also had elevated LFTs during the same                          admission and MRCP showed mild dilation of                          intrahepatic and extrahepatic biliary ducts and                          gallbladder with sludge extending to the bladder neck.                          She underwent an ERCP which showed hemobilia. A                          biliary sphincterotomy was performed and a                          tr anspabillary [place] Viabil [id number] was placed in the                          common bile duct. A 4 Fr by 11 cm temporary Arroyo                          pancreatic was placed in the PD. She since had                          cholecystectomy. LFTs are have been normal since. She                          presents for an ERCP for stent removal. Patient's -                          PEG-J retracted into the stomach during the ERCP                          procedure and was successfully replaced after the ERCP                          procedure  Providers:             MERY ARROYO,  MD, ROLANDO RENAE  Referring MD:          MONSERRAT CANALES  Requesting Provider:   MONSERRAT CANALES  Medicines:             General Anesthesia  Complications:         No immediate complications.  _______________________________________________________________________________  Procedure:             Pre-Anesthesia Assessment:                         - Prior to the procedure, a History and Physical was                           performed, and patient medications and allergies were                          reviewed. The patient is competent. The risks and                          benefits of the procedure and the sedation options and                          risks were discussed with the patient. All questions                          were answered and informed consent was obtained.                          Patient identification and proposed procedure were                          verified by the physician, the nurse and the                          anesthesiologist in the procedure room. Mental Status                          Examination: alert and oriented. Airway Examination:                          normal oropharyngeal airway and neck mobility.                          Respiratory Examination: clear to auscultation. CV                          Examination: normal. Prophylactic Antibiotics: The                          patient does not require prophylactic antibiotics.                           Prior Anticoagulants: The patient has taken no                          anticoagulant or antiplatelet agents. ASA Grade                          Assessment: III - A patient with severe systemic                          disease. After reviewing the risks and benefits, the                          patient was deemed in satisfactory condition to                          undergo the procedure. The anesthesia plan was to use                          general anesthesia. Immediately prior to                           administration of medications, the patient was                          re-assessed for adequacy to receive sedatives. The                          heart rate, respiratory rate, oxygen saturations,                          blood pressure, adequacy of pulmonary ventilation, and                          response to care were monitored throughout the                          procedure. The physical status of the patient was                          re-assessed after the pro cedure.                         After obtaining informed consent, the endoscope was                          passed under direct vision. Throughout the procedure,                          the patient's blood pressure, pulse, and oxygen                          saturations were monitored continuously. The was                          introduced through the mouth, and advanced to the                          third part of duodenum. The upper GI endoscopy was                          accomplished without difficulty. The patient tolerated                          the procedure well.                                                                                   Findings:       The examined esophagus was normal.       There was evidence of a gastrostomy present in the gastric antrum. Prior        PEG-J coiled in the stomach       PEG-J exchange:       The patient was placed in the supine position. A glidewire was advanced        across the gastrostomy alongside the gastrojejunal tube into  the        stomach. The prevoiusly placed gastrojejujunal feeding tube was removed.        A pediatric upper endoscope was advanced through gastrostomy tract to        the proximal jejunum. A 0.035 inch Glide wire was advanced into the        jejunum and the endoscope removed. An 18 Fr by 45 cm gastrojejunal        feeding tube was advanced into the jejunum over the wire and position        confirmed fluoroscopically. The internal balloon was inflated with water         and a small amount of contrast. The external bumper was cinched to 3 cm        at the skin. Patient was then intubated with a pediatric upper endoscope        to confirm location of the balloon which was noted to be in the correct        location.                                                                                   Impression:            - Normal esophagus.                         - PEG-J successfully replaced as above  Recommendation:        - Return patient to [facility] for [facility] care.                          - Ok to use the new PEG-J for feeding per prior tube                          feeds                                                                                     Electronically signed by RAJ Arroyo  ________________________  MERY ARROYO MD  10/10/2022 7:00:15 PM  I was physically present for the entire viewing portion of the exam.  __________________________  Signature of teaching physician  [id number] ARROYO MD    __________________  [facility]   Number of Addenda: 0    Note Initiated On: 10/7/2022 9:53 AM  Scope In:  Scope Out:     Glucose by meter    Collection Time: 10/07/22  1:56 PM   Result Value Ref Range    GLUCOSE BY METER POCT 175 (H) 70 - 99 mg/dL   Glucose by meter    Collection Time: 10/07/22  4:46 PM   Result Value Ref Range    GLUCOSE BY METER POCT 159 (H) 70 - 99 mg/dL   Glucose by meter    Collection Time: 10/07/22  9:40 PM   Result Value Ref Range    GLUCOSE BY METER POCT 224 (H) 70 - 99 mg/dL   Glucose by meter    Collection Time: 10/08/22  3:14 AM   Result Value Ref Range    GLUCOSE BY METER POCT 173 (H) 70 - 99 mg/dL   Comprehensive metabolic panel    Collection Time: 10/08/22  7:46 AM   Result Value Ref Range    Sodium 135 (L) 136 - 145 mmol/L    Potassium 4.6 3.4 - 5.3 mmol/L    Chloride 93 (L) 98 - 107 mmol/L    Carbon Dioxide (CO2) 32 (H) 22 - 29 mmol/L    Anion Gap 10 7 - 15 mmol/L    Urea Nitrogen 56.9 (H) 8.0 - 23.0 mg/dL     Creatinine 3.22 (H) 0.51 - 0.95 mg/dL    Calcium 9.6 8.8 - 10.2 mg/dL    Glucose 106 (H) 70 - 99 mg/dL    Alkaline Phosphatase 78 35 - 104 U/L    AST 21 10 - 35 U/L    ALT 10 10 - 35 U/L    Protein Total 5.5 (L) 6.4 - 8.3 g/dL    Albumin 3.1 (L) 3.5 - 5.2 g/dL    Bilirubin Total 0.2 <=1.2 mg/dL    GFR Estimate 16 (L) >60 mL/min/1.73m2   CBC with platelets    Collection Time: 10/08/22  7:46 AM   Result Value Ref Range    WBC Count 7.2 4.0 - 11.0 10e3/uL    RBC Count 2.83 (L) 3.80 - 5.20 10e6/uL    Hemoglobin 9.3 (L) 11.7 - 15.7 g/dL    Hematocrit 30.0 (L) 35.0 - 47.0 %     (H) 78 - 100 fL    MCH 32.9 26.5 - 33.0 pg    MCHC 31.0 (L) 31.5 - 36.5 g/dL    RDW 19.9 (H) 10.0 - 15.0 %    Platelet Count 210 150 - 450 10e3/uL   Glucose by meter    Collection Time: 10/08/22 11:07 AM   Result Value Ref Range    GLUCOSE BY METER POCT 162 (H) 70 - 99 mg/dL   General PFT Lab (Please always keep checked)    Collection Time: 10/12/22  8:48 AM   Result Value Ref Range    FVC-Pred 3.06 L    FVC-Pre 1.31 L    FVC-%Pred-Pre 42 %    FEV1-Pre 1.23 L    FEV1-%Pred-Pre 50 %    FEV1FVC-Pred 79 %    FEV1FVC-Pre 93 %    FEFMax-Pred 6.14 L/sec    FEFMax-Pre 4.50 L/sec    FEFMax-%Pred-Pre 73 %    FEF2575-Pred 2.22 L/sec    FEF2575-Pre 2.26 L/sec    MYQ4380-%Pred-Pre 101 %    ExpTime-Pre 4.79 sec    FIFMax-Pre 3.69 L/sec    FEV1FEV6-Pred 81 %    FEV1FEV6-Pre 94 %   CBC with platelets    Collection Time: 10/12/22  9:44 AM   Result Value Ref Range    WBC Count 8.3 4.0 - 11.0 10e3/uL    RBC Count 2.85 (L) 3.80 - 5.20 10e6/uL    Hemoglobin 9.4 (L) 11.7 - 15.7 g/dL    Hematocrit 29.1 (L) 35.0 - 47.0 %     (H) 78 - 100 fL    MCH 33.0 26.5 - 33.0 pg    MCHC 32.3 31.5 - 36.5 g/dL    RDW 18.6 (H) 10.0 - 15.0 %    Platelet Count 189 150 - 450 10e3/uL   Magnesium    Collection Time: 10/12/22  9:44 AM   Result Value Ref Range    Magnesium 2.0 1.7 - 2.3 mg/dL   Basic metabolic panel    Collection Time: 10/12/22  9:44 AM   Result Value Ref Range     Sodium 132 (L) 136 - 145 mmol/L    Potassium 3.8 3.4 - 5.3 mmol/L    Chloride 92 (L) 98 - 107 mmol/L    Carbon Dioxide (CO2) 29 22 - 29 mmol/L    Anion Gap 11 7 - 15 mmol/L    Urea Nitrogen 47.3 (H) 8.0 - 23.0 mg/dL    Creatinine 2.10 (H) 0.51 - 0.95 mg/dL    Calcium 9.3 8.8 - 10.2 mg/dL    Glucose 120 (H) 70 - 99 mg/dL    GFR Estimate 26 (L) >60 mL/min/1.73m2     PFT interpretation:  Maneuver: valid, but did not meet ATS guidelines

## 2022-10-12 NOTE — TELEPHONE ENCOUNTER
Referred by Dr. Claribel Franks on 09/02/2022    Discharged from the hospital on 10/8/22 with out patient dialysis.  Closing Anemia Services.     Anemia Latest Ref Rng & Units 9/27/2022 9/28/2022 10/2/2022 10/6/2022 10/7/2022 10/7/2022 10/8/2022   Hemoglobin 11.7 - 15.7 g/dL 7.9(L) 8.4(L) 8.4(L) 9.3(L) 10.2(L) 9.5(L) 9.3(L)   TSAT 15 - 46 % - - - - - - -   Ferritin 11 - 328 ng/mL - - - - - - -       Anemia labs discontinued, therapy plans cancelled, removed from active patient list, and referring provider notified.    Sara Azul RN   Anemia Services  [address] [address]   [email or web address]   Office : [phone]  Fax: [phone]

## 2022-10-13 DIAGNOSIS — Z94.2 S/P LUNG TRANSPLANT (H): Chronic | ICD-10-CM

## 2022-10-13 DIAGNOSIS — D84.9 IMMUNOSUPPRESSED STATUS (H): Chronic | ICD-10-CM

## 2022-10-13 LAB
CMV DNA SPEC NAA+PROBE-ACNC: NOT DETECTED IU/ML
EBV DNA COPIES/ML, INSTRUMENT: 1958 COPIES/ML
EBV DNA SPEC NAA+PROBE-LOG#: 3.3 {LOG_COPIES}/ML

## 2022-10-13 NOTE — RESULT ENCOUNTER NOTE
Tacrolimus level 9.1 at 13.5 hours, on 10/12/22.  Goal 8-12.   Current dose 3.5 mg in AM, 3.5 mg in PM    No change in dose, pt likely at goal at 12 hours.   Recheck level in a week    "[employer] message sent

## 2022-10-14 ENCOUNTER — HOSPITAL ENCOUNTER (OUTPATIENT)
Dept: CARDIAC REHAB | Facility: CLINIC | Age: 62
Discharge: HOME | End: 2022-10-14
Attending: INTERNAL MEDICINE
Payer: MEDICARE

## 2022-10-14 PROCEDURE — G0239 OTH RESP PROC, GROUP: HCPCS

## 2022-10-15 LAB — BACTERIA PLR CULT: NO GROWTH

## 2022-10-17 DIAGNOSIS — D63.1 ANEMIA OF CHRONIC RENAL FAILURE, STAGE 3B (H): ICD-10-CM

## 2022-10-17 DIAGNOSIS — N18.32 ANEMIA OF CHRONIC RENAL FAILURE, STAGE 3B (H): ICD-10-CM

## 2022-10-17 RX ORDER — GUAR GUM
1 PACKET (EA) ORAL
Qty: 75 EACH | Refills: 3 | Status: SHIPPED | OUTPATIENT
Start: 2022-10-17 | End: 2023-02-06

## 2022-10-19 ENCOUNTER — LAB (OUTPATIENT)
Dept: LAB | Facility: CLINIC | Age: 62
End: 2022-10-19
Payer: MEDICARE

## 2022-10-19 ENCOUNTER — VIRTUAL VISIT (OUTPATIENT)
Dept: CARDIOLOGY | Facility: CLINIC | Age: 62
End: 2022-10-19
Attending: INTERNAL MEDICINE
Payer: MEDICARE

## 2022-10-19 ENCOUNTER — TELEPHONE (OUTPATIENT)
Dept: TRANSPLANT | Facility: CLINIC | Age: 62
End: 2022-10-19

## 2022-10-19 DIAGNOSIS — D84.9 IMMUNOSUPPRESSED STATUS (H): ICD-10-CM

## 2022-10-19 DIAGNOSIS — I48.0 PAROXYSMAL ATRIAL FIBRILLATION (H): Primary | ICD-10-CM

## 2022-10-19 DIAGNOSIS — Z94.2 S/P LUNG TRANSPLANT (H): Primary | ICD-10-CM

## 2022-10-19 DIAGNOSIS — Z94.2 S/P LUNG TRANSPLANT (H): ICD-10-CM

## 2022-10-19 DIAGNOSIS — Z94.2 LUNG REPLACED BY TRANSPLANT (H): ICD-10-CM

## 2022-10-19 LAB
ANION GAP SERPL CALCULATED.3IONS-SCNC: 11 MMOL/L (ref 7–15)
BUN SERPL-MCNC: 49.2 MG/DL (ref 8–23)
CALCIUM SERPL-MCNC: 9.9 MG/DL (ref 8.8–10.2)
CHLORIDE SERPL-SCNC: 90 MMOL/L (ref 98–107)
CREAT SERPL-MCNC: 2.16 MG/DL (ref 0.51–0.95)
DEPRECATED HCO3 PLAS-SCNC: 30 MMOL/L (ref 22–29)
ERYTHROCYTE [DISTWIDTH] IN BLOOD BY AUTOMATED COUNT: 17.4 % (ref 10–15)
GFR SERPL CREATININE-BSD FRML MDRD: 25 ML/MIN/1.73M2
GLUCOSE SERPL-MCNC: 123 MG/DL (ref 70–99)
HCT VFR BLD AUTO: 32.7 % (ref 35–47)
HGB BLD-MCNC: 10.7 G/DL (ref 11.7–15.7)
Lab: NORMAL
MAGNESIUM SERPL-MCNC: 2.1 MG/DL (ref 1.7–2.3)
MCH RBC QN AUTO: 34.3 PG (ref 26.5–33)
MCHC RBC AUTO-ENTMCNC: 32.7 G/DL (ref 31.5–36.5)
MCV RBC AUTO: 105 FL (ref 78–100)
PERFORMING LABORATORY: NORMAL
PLATELET # BLD AUTO: 270 10E3/UL (ref 150–450)
POTASSIUM SERPL-SCNC: 4 MMOL/L (ref 3.4–5.3)
RBC # BLD AUTO: 3.12 10E6/UL (ref 3.8–5.2)
SODIUM SERPL-SCNC: 131 MMOL/L (ref 136–145)
SPECIMEN STATUS: NORMAL
TACROLIMUS BLD-MCNC: 15.6 UG/L (ref 5–15)
TEST NAME: NORMAL
TME LAST DOSE: ABNORMAL H
TME LAST DOSE: ABNORMAL H
WBC # BLD AUTO: 7.2 10E3/UL (ref 4–11)

## 2022-10-19 PROCEDURE — 85027 COMPLETE CBC AUTOMATED: CPT | Performed by: PATHOLOGY

## 2022-10-19 PROCEDURE — 99214 OFFICE O/P EST MOD 30 MIN: CPT | Mod: 95 | Performed by: INTERNAL MEDICINE

## 2022-10-19 PROCEDURE — 80197 ASSAY OF TACROLIMUS: CPT | Performed by: PHYSICIAN ASSISTANT

## 2022-10-19 PROCEDURE — 80048 BASIC METABOLIC PNL TOTAL CA: CPT | Performed by: PATHOLOGY

## 2022-10-19 PROCEDURE — G0463 HOSPITAL OUTPT CLINIC VISIT: HCPCS | Mod: PN,RTG | Performed by: INTERNAL MEDICINE

## 2022-10-19 PROCEDURE — 86833 HLA CLASS II HIGH DEFIN QUAL: CPT | Performed by: PHYSICIAN ASSISTANT

## 2022-10-19 PROCEDURE — 36415 COLL VENOUS BLD VENIPUNCTURE: CPT | Performed by: PATHOLOGY

## 2022-10-19 PROCEDURE — 86832 HLA CLASS I HIGH DEFIN QUAL: CPT | Performed by: PHYSICIAN ASSISTANT

## 2022-10-19 PROCEDURE — 83735 ASSAY OF MAGNESIUM: CPT | Performed by: PATHOLOGY

## 2022-10-19 NOTE — TELEPHONE ENCOUNTER
Tacrolimus level 15.6 at 12 hours, on 10/19/22.  Goal 8-12.   Current dose 3.5 mg in AM, 3.5 mg in PM    Dose changed to 3.5 mg in AM, 3 mg in PM   Recheck level Monday     Discussed with patient    MyChart message sent

## 2022-10-19 NOTE — NURSING NOTE
Chief Complaint   Patient presents with     Follow Up     Pt flagged a few medications for removal during e check in      Patient declined individual allergy and medication review by support staff because patient denies any changes since echeck-in completion and states all information entered during echeck-in remains accurate.    Antoine Esteban, VF/CMA

## 2022-10-19 NOTE — PROGRESS NOTES
"Sofie is a 62 year old who is being evaluated via a billable video visit.      How would you like to obtain your AVS? MyChart  If the video visit is dropped, the invitation should be resent by: Text to cell phone: [phone]  Will anyone else be joining your video visit? CIARRA Lutz/JOSE                ELECTROPHYSIOLOGY VIRTUAL CLINIC VISIT  Ms. Sofie Rodriguez is a 62 year old female who is being evaluated via a billable video visit.      The patient has been notified of following:     \"This video visit will be conducted via a call between you and your physician/provider. We have found that certain health care needs can be provided without the need for an in-person physical exam.  This service lets us provide the care you need with a video conversation.  If a prescription is necessary we can send it directly to your pharmacy.  If lab work is needed we can place an order for that and you can then stop by our lab to have the test done at a later time.    If during the course of the call the physician/provider feels a video visit is not appropriate, you will not be charged for this service.\"     Physician/provider has received verbal consent for a Video Visit from the patient? Yes    Assessment/Recommendations   Assessment/Plan:    The patient is a 61 yo woman with PMH significant for HTN, HCV, polysubstance abuse, severe COPD s/p BSLT on 6/28/2022 complicated by respiratory failure, acute hepatitis, EBV infection with a long hospital stay, and postoperative SVT and AFL, who presents to follow-up on her atrial arrhythmias.  She has had no recurrences, neither symptomatic or documented on monitoring. She is still on metoprolol 25 BID, but she takes this now mostly for BP control. Her EF is normal.  She does not need any further intervention for the atrial arrhythmias at this stage.     Follow up as needed       History of Present Illness/Subjective    Ms. Sofie Rodriguez is a 62 year old female who comes in " today for EP follow-up.    The patient is a 63 yo woman with PMH significant for HTN, HCV, polysubstance abuse, severe COPD s/p BSLT on 6/28/2022 complicated by respiratory failure, acute hepatitis, EBV infection with a long hospital stay, and postoperative SVT and AFL, who presents to follow-up on her atrial arrhythmias.    The patient had SVT documented on EKG on 7/14/22 then AFL documented on 7/17/22, both self terminating, both sympotmaitc. The patient never had this before or since this event. She was seen on EP consult on 7/17 and Eliquis was recommended but was never started at discharge. She was discharged on metoprolol 50 BID. She has had no recurrences. Her last TTE was done on 6/30/2022 and was normal with no structural heart disease. SHe had a Zio patch done 8/24-9/7/22 showing no AF and only short runs of nonsustained AT.   She reports feeling much better now. She denies chest discomfort, palpitations, abdominal fullness/bloating or peripheral edema, shortness of breath, paroxysmal nocturnal dyspnea, orthopnea, lightheadedness, dizziness, pre-syncope, or syncope.Current cardiac medications include: metoprolol 25 BID.         I have reviewed and updated the patient's Past Medical History, Social History, Family History and Medication List.     Cardiographics (Personally Reviewed) :   TTE 9/20/22:  Interpretation Summary  Limited TTE to evaluate dyspnea.  Global and regional left ventricular function is normal with an EF of 55-60%.  Global right ventricular function is normal. The right ventricle is normal size.  No significant valvular abnormalities.  No pericardial effusion is present.  This study was compared with the study from 08/15/2022. No significant changes noted.     Physical Examination   There were no vitals taken for this visit.  Wt Readings from Last 3 Encounters:   10/08/22 68 kg (150 lb)   09/01/22 70.3 kg (155 lb)   08/29/22 70.3 kg (155 lb)     The rest of a comprehensive physical  examination is deferred due to public health emergency video visit restrictions.  CONSITUTIONAL: no acute distress  HEENT: no icterus, no redness or discharge, neck supple  CV: no visible edema of visualized extremities. No JVD.   RESPIRATORY: respirations nonlabored, no cough  NEURO: AA&Ox3, speech fluent/appropriate, motor grossly nonfocal  PSYCH: cooperative, affect appropriate  DERM: no rashes on visualized face/neck/upper extremities       Medications  Allergies   Current Outpatient Medications   Medication Sig Dispense Refill     acetaminophen (TYLENOL) 160 MG/5ML liquid 31.25 mLs (1,000 mg) by Per J Tube route every 6 hours as needed for mild pain or fever 1200 mL 3     alcohol swab prep pads Use to swab area of injection/amos as directed. 100 each 6     azaTHIOprine (IMURAN) 5 mg/mL SUSP 20 mLs (100 mg) by Per J Tube route daily 600 mL 11     B and C vitamin Complex with folic acid (NEPHRONEX) 0.9 MG/5ML LIQD liquid 5 mLs by Per J Tube route daily 237 mL 11     blood glucose (CONTOUR NEXT TEST) test strip Use to test blood sugar 4 times daily, as directed. 400 strip 0     blood glucose (NO BRAND SPECIFIED) lancets standard Use to test blood sugar 4 times daily or as directed. 400 lancet 0     blood glucose monitoring (CONTOUR NEXT MONITOR W/DEVICE KIT) meter device kit Use to test blood sugar 4 times daily or as directed. 1 kit 0     calcium carbonate 600 mg-vitamin D 400 units (CALTRATE) 600-400 MG-UNIT per tablet 1 tablet by Per J Tube route 2 times daily (with meals) 60 tablet 11     cyanocobalamin (CYANOCOBALAMIN) 500 MCG tablet 1 tablet (500 mcg) by Per Feeding Tube route daily 30 tablet 11     dapsone 2 mg/mL SUSP 25 mLs (50 mg) by Per J Tube route Every Mon, Wed, Fri Morning 1000 mL 11     folic acid (FOLVITE) 1 MG tablet 1 tablet (1 mg) by Per J Tube route daily 30 tablet 11     gabapentin (NEURONTIN) 250 MG/5ML solution 2 mLs (100 mg) by Per J Tube route At Bedtime 60 mL 11     Guar Gum (FIBER  MODULAR, NUTRISOURCE FIBER,) packet 1 packet by Per Feeding Tube route 3 times daily (with meals) 75 each 3     insulin aspart (NOVOLOG PEN) 100 UNIT/ML pen Inject 1-3 Units Subcutaneous every 6 hours       insulin pen needle (31G X 5 MM) 31G X 5 MM miscellaneous Use one needle daily, as directed 100 each 0     loperamide (IMODIUM A-D) 2 MG tablet 1 tablet (2 mg) by Per J Tube route 4 times daily as needed for diarrhea       metoprolol tartrate (LOPRESSOR) 50 MG tablet 0.5 tablets (25 mg) by Per Feeding Tube route 2 times daily 60 tablet 11     Nutritional Supplements (GLUCOSE MANAGEMENT) TABS Take 3-4 tablets by mouth as needed (hypoglycemia) Pharmacist may change instructions to fit whatever glucose tab product they have in stock. (Patient not taking: Reported on 9/11/2022) 120 tablet 1     nystatin (MYCOSTATIN) 486488 UNIT/ML suspension Swish and swallow 10 mLs (1,000,000 Units) in mouth 4 times daily 1200 mL 3     ondansetron (ZOFRAN ODT) 4 MG ODT tab Take 1 tablet (4 mg) by mouth every 6 hours as needed for nausea or vomiting 30 tablet 0     oxyCODONE (ROXICODONE) 5 MG tablet 1 tablet (5 mg) by Per J Tube route every 8 hours as needed for breakthrough pain 15 tablet 0     pantoprazole (PROTONIX) 2 mg/mL SUSP suspension 20 mLs (40 mg) by Per J Tube route 2 times daily 1200 mL 11     predniSONE (DELTASONE) 5 MG tablet 2 tablets (10 mg) by Per J Tube route every morning AND 1.5 tablets (7.5 mg) every evening. Take 10 mg (two tabs) in the morning  and 7.5 mg (1.5 tabs) in the evening 120 tablet 3     protein modular (PROSOURCE TF) LIQD 1 packet by Per Feeding Tube route daily 30 packet 11     Sharps Container MISC 1 sharps container 1 each 0     tacrolimus (GENERIC EQUIVALENT) 1 mg/mL suspension 3 mg by Per J Tube route 2 times daily Total dose: 3.5 mg in AM 3mg in  mL 11     traZODone (DESYREL) 50 MG tablet 0.5 tablets (25 mg) by Per J Tube route nightly as needed for sleep 15 tablet 11    Allergies    Allergen Reactions     Blood Transfusion Related (Informational Only) Other (See Comments)     Patient has a history of a clinically significant antibody against RBC antigens.  A delay in compatible RBCs may occur.          Lab Results (Personally Reviewed)    Chemistry/lipid CBC Cardiac Enzymes/BNP/TSH/INR   Lab Results   Component Value Date    BUN 49.2 (H) 10/19/2022     (L) 10/19/2022    CO2 30 (H) 10/19/2022     Creatinine   Date Value Ref Range Status   10/19/2022 2.16 (H) 0.51 - 0.95 mg/dL Final   06/30/2021 0.85 0.52 - 1.04 mg/dL Final       Lab Results   Component Value Date    CHOL 234 (H) 11/13/2020    HDL 75 11/13/2020     11/13/2020      Lab Results   Component Value Date    WBC 7.2 10/19/2022    HGB 10.7 (L) 10/19/2022    HCT 32.7 (L) 10/19/2022     (H) 10/19/2022     10/19/2022    Lab Results   Component Value Date    TSH 1.49 07/18/2022    INR 0.92 10/07/2022          Video-Visit Details    Type of service:  Video Visit    Video Start Time: 11:30    Video End Time:12:00    Originating Location (pt. Location): Home    Distant Location (provider location):  [facility]     Platform used for Video Visit: [employer]    I have reviewed the note as documented above.  This accurately captures the substance of my virtual visit with the patient. The patient states understanding and is agreeable with the plan.   RUFUS Starr CNP  Electrophysiology Consult Service  Pager: [phone]          Total time spent on patient visit, reviewing notes, imaging, labs, orders, and completing necessary documentation: 35 minutes.

## 2022-10-19 NOTE — LETTER
"10/19/2022      RE: Sofie Rodriguez  [address] [place] [address]       Dear Colleague,    Thank you for the opportunity to participate in the care of your patient, Sofie Rodriguez, at the [facility] [place] at [facility]. Please see a copy of my visit note below.      ELECTROPHYSIOLOGY VIRTUAL CLINIC VISIT  Ms. Sofie Rodriguez is a 62 year old female who is being evaluated via a billable video visit.      The patient has been notified of following:     \"This video visit will be conducted via a call between you and your physician/provider. We have found that certain health care needs can be provided without the need for an in-person physical exam.  This service lets us provide the care you need with a video conversation.  If a prescription is necessary we can send it directly to your pharmacy.  If lab work is needed we can place an order for that and you can then stop by our lab to have the test done at a later time.    If during the course of the call the physician/provider feels a video visit is not appropriate, you will not be charged for this service.\"     Physician/provider has received verbal consent for a Video Visit from the patient? Yes    Assessment/Recommendations   Assessment/Plan:    The patient is a 61 yo woman with PMH significant for HTN, HCV, polysubstance abuse, severe COPD s/p BSLT on 6/28/2022 complicated by respiratory failure, acute hepatitis, EBV infection with a long hospital stay, and postoperative SVT and AFL, who presents to follow-up on her atrial arrhythmias.  She has had no recurrences, neither symptomatic or documented on monitoring. She is still on metoprolol 25 BID, but she takes this now mostly for BP control. Her EF is normal.  She does not need any further intervention for the atrial arrhythmias at this stage.     Follow up as needed       History of Present Illness/Subjective    Ms. Sofie Rodriguez is a 62 " year old female who comes in today for EP follow-up.    The patient is a 61 yo woman with PMH significant for HTN, HCV, polysubstance abuse, severe COPD s/p BSLT on 6/28/2022 complicated by respiratory failure, acute hepatitis, EBV infection with a long hospital stay, and postoperative SVT and AFL, who presents to follow-up on her atrial arrhythmias.    The patient had SVT documented on EKG on 7/14/22 then AFL documented on 7/17/22, both self terminating, both sympotmaitc. The patient never had this before or since this event. She was seen on EP consult on 7/17 and Eliquis was recommended but was never started at discharge. She was discharged on metoprolol 50 BID. She has had no recurrences. Her last TTE was done on 6/30/2022 and was normal with no structural heart disease. SHe had a Zio patch done 8/24-9/7/22 showing no AF and only short runs of nonsustained AT.   She reports feeling much better now. She denies chest discomfort, palpitations, abdominal fullness/bloating or peripheral edema, shortness of breath, paroxysmal nocturnal dyspnea, orthopnea, lightheadedness, dizziness, pre-syncope, or syncope.Current cardiac medications include: metoprolol 25 BID.         I have reviewed and updated the patient's Past Medical History, Social History, Family History and Medication List.     Cardiographics (Personally Reviewed) :   TTE 9/20/22:  Interpretation Summary  Limited TTE to evaluate dyspnea.  Global and regional left ventricular function is normal with an EF of 55-60%.  Global right ventricular function is normal. The right ventricle is normal size.  No significant valvular abnormalities.  No pericardial effusion is present.  This study was compared with the study from 08/15/2022. No significant changes noted.     Physical Examination   There were no vitals taken for this visit.  Wt Readings from Last 3 Encounters:   10/08/22 68 kg (150 lb)   09/01/22 70.3 kg (155 lb)   08/29/22 70.3 kg (155 lb)     The rest of a  comprehensive physical examination is deferred due to public health emergency video visit restrictions.  CONSITUTIONAL: no acute distress  HEENT: no icterus, no redness or discharge, neck supple  CV: no visible edema of visualized extremities. No JVD.   RESPIRATORY: respirations nonlabored, no cough  NEURO: AA&Ox3, speech fluent/appropriate, motor grossly nonfocal  PSYCH: cooperative, affect appropriate  DERM: no rashes on visualized face/neck/upper extremities       Medications  Allergies   Current Outpatient Medications   Medication Sig Dispense Refill     acetaminophen (TYLENOL) 160 MG/5ML liquid 31.25 mLs (1,000 mg) by Per J Tube route every 6 hours as needed for mild pain or fever 1200 mL 3     alcohol swab prep pads Use to swab area of injection/amos as directed. 100 each 6     azaTHIOprine (IMURAN) 5 mg/mL SUSP 20 mLs (100 mg) by Per J Tube route daily 600 mL 11     B and C vitamin Complex with folic acid (NEPHRONEX) 0.9 MG/5ML LIQD liquid 5 mLs by Per J Tube route daily 237 mL 11     blood glucose (CONTOUR NEXT TEST) test strip Use to test blood sugar 4 times daily, as directed. 400 strip 0     blood glucose (NO BRAND SPECIFIED) lancets standard Use to test blood sugar 4 times daily or as directed. 400 lancet 0     blood glucose monitoring (CONTOUR NEXT MONITOR W/DEVICE KIT) meter device kit Use to test blood sugar 4 times daily or as directed. 1 kit 0     calcium carbonate 600 mg-vitamin D 400 units (CALTRATE) 600-400 MG-UNIT per tablet 1 tablet by Per J Tube route 2 times daily (with meals) 60 tablet 11     cyanocobalamin (CYANOCOBALAMIN) 500 MCG tablet 1 tablet (500 mcg) by Per Feeding Tube route daily 30 tablet 11     dapsone 2 mg/mL SUSP 25 mLs (50 mg) by Per J Tube route Every Mon, Wed, Fri Morning 1000 mL 11     folic acid (FOLVITE) 1 MG tablet 1 tablet (1 mg) by Per J Tube route daily 30 tablet 11     gabapentin (NEURONTIN) 250 MG/5ML solution 2 mLs (100 mg) by Per J Tube route At Bedtime 60 mL 11      Guar Gum (FIBER MODULAR, NUTRISOURCE FIBER,) packet 1 packet by Per Feeding Tube route 3 times daily (with meals) 75 each 3     insulin aspart (NOVOLOG PEN) 100 UNIT/ML pen Inject 1-3 Units Subcutaneous every 6 hours       insulin pen needle (31G X 5 MM) 31G X 5 MM miscellaneous Use one needle daily, as directed 100 each 0     loperamide (IMODIUM A-D) 2 MG tablet 1 tablet (2 mg) by Per J Tube route 4 times daily as needed for diarrhea       metoprolol tartrate (LOPRESSOR) 50 MG tablet 0.5 tablets (25 mg) by Per Feeding Tube route 2 times daily 60 tablet 11     Nutritional Supplements (GLUCOSE MANAGEMENT) TABS Take 3-4 tablets by mouth as needed (hypoglycemia) Pharmacist may change instructions to fit whatever glucose tab product they have in stock. (Patient not taking: Reported on 9/11/2022) 120 tablet 1     nystatin (MYCOSTATIN) 703434 UNIT/ML suspension Swish and swallow 10 mLs (1,000,000 Units) in mouth 4 times daily 1200 mL 3     ondansetron (ZOFRAN ODT) 4 MG ODT tab Take 1 tablet (4 mg) by mouth every 6 hours as needed for nausea or vomiting 30 tablet 0     oxyCODONE (ROXICODONE) 5 MG tablet 1 tablet (5 mg) by Per J Tube route every 8 hours as needed for breakthrough pain 15 tablet 0     pantoprazole (PROTONIX) 2 mg/mL SUSP suspension 20 mLs (40 mg) by Per J Tube route 2 times daily 1200 mL 11     predniSONE (DELTASONE) 5 MG tablet 2 tablets (10 mg) by Per J Tube route every morning AND 1.5 tablets (7.5 mg) every evening. Take 10 mg (two tabs) in the morning  and 7.5 mg (1.5 tabs) in the evening 120 tablet 3     protein modular (PROSOURCE TF) LIQD 1 packet by Per Feeding Tube route daily 30 packet 11     Sharps Container MISC 1 sharps container 1 each 0     tacrolimus (GENERIC EQUIVALENT) 1 mg/mL suspension 3 mg by Per J Tube route 2 times daily Total dose: 3.5 mg in AM 3mg in  mL 11     traZODone (DESYREL) 50 MG tablet 0.5 tablets (25 mg) by Per J Tube route nightly as needed for sleep 15 tablet 11     Allergies   Allergen Reactions     Blood Transfusion Related (Informational Only) Other (See Comments)     Patient has a history of a clinically significant antibody against RBC antigens.  A delay in compatible RBCs may occur.          Lab Results (Personally Reviewed)    Chemistry/lipid CBC Cardiac Enzymes/BNP/TSH/INR   Lab Results   Component Value Date    BUN 49.2 (H) 10/19/2022     (L) 10/19/2022    CO2 30 (H) 10/19/2022     Creatinine   Date Value Ref Range Status   10/19/2022 2.16 (H) 0.51 - 0.95 mg/dL Final   06/30/2021 0.85 0.52 - 1.04 mg/dL Final       Lab Results   Component Value Date    CHOL 234 (H) 11/13/2020    HDL 75 11/13/2020     11/13/2020      Lab Results   Component Value Date    WBC 7.2 10/19/2022    HGB 10.7 (L) 10/19/2022    HCT 32.7 (L) 10/19/2022     (H) 10/19/2022     10/19/2022    Lab Results   Component Value Date    TSH 1.49 07/18/2022    INR 0.92 10/07/2022          Video-Visit Details    Type of service:  Video Visit    Video Start Time: 11:30    Video End Time:12:00    Originating Location (pt. Location): Home    Distant Location (provider location):  [facility]     Platform used for Video Visit: [employer]    I have reviewed the note as documented above.  This accurately captures the substance of my virtual visit with the patient. The patient states understanding and is agreeable with the plan.   RUFUS Starr CNP  Electrophysiology Consult Service  Pager: [phone]          Total time spent on patient visit, reviewing notes, imaging, labs, orders, and completing necessary documentation: 35 minutes.               Please do not hesitate to contact me if you have any questions/concerns.     Sincerely,     Neel Leroy MD

## 2022-10-19 NOTE — PATIENT INSTRUCTIONS
Plan:   Follow-up as needed      Your Care Team:  EP Cardiology   Telephone Number     Beulah Lewis RN [phone]    After business hours: [phone], ask for cardiologist on-call   Non-procedure scheduling:    Evie NAJERA   [phone]   Procedure scheduling:    Molly Gaytan   [phone]   Device Clinic (Pacemakers, ICDs, Loop Recorders)    During business hours: [phone]  After business hours:   [phone]- select option 4 and ask for job code [id number].       Cardiovascular Clinic:   [address] [place] [address]. [place] [address]      As always, Thank you for trusting us with your health care needs!

## 2022-10-20 LAB
CMV DNA SPEC NAA+PROBE-ACNC: NOT DETECTED IU/ML
CW10: 1488
DONOR IDENTIFICATION: NORMAL
DQB2: 8478
DSA COMMENTS: NORMAL
DSA PRESENT: YES
DSA TEST METHOD: NORMAL
ORGAN: NORMAL
SA 1 CELL: NORMAL
SA 1 TEST METHOD: NORMAL
SA 2 CELL: NORMAL
SA 2 TEST METHOD: NORMAL
SA1 HI RISK ABY: NORMAL
SA1 MOD RISK ABY: NORMAL
SA2 HI RISK ABY: NORMAL
SA2 MOD RISK ABY: NORMAL
UNACCEPTABLE ANTIGENS: NORMAL
UNOS CPRA: 37
ZZZSA 1  COMMENTS: NORMAL
ZZZSA 2 COMMENTS: NORMAL

## 2022-10-21 ENCOUNTER — HOSPITAL ENCOUNTER (OUTPATIENT)
Dept: CARDIAC REHAB | Facility: CLINIC | Age: 62
Discharge: HOME | End: 2022-10-21
Attending: INTERNAL MEDICINE
Payer: MEDICARE

## 2022-10-21 PROCEDURE — G0239 OTH RESP PROC, GROUP: HCPCS

## 2022-10-21 NOTE — RESULT ENCOUNTER NOTE
New DSA from 10/19:   DQB2 is up to 8478 from 5399 on 10/5/22    Per Dr. Franks: repeat level in one month

## 2022-10-24 ENCOUNTER — VIRTUAL VISIT (OUTPATIENT)
Dept: PHARMACY | Facility: CLINIC | Age: 62
End: 2022-10-24
Payer: COMMERCIAL

## 2022-10-24 ENCOUNTER — LAB (OUTPATIENT)
Dept: LAB | Facility: CLINIC | Age: 62
End: 2022-10-24
Payer: MEDICARE

## 2022-10-24 VITALS — SYSTOLIC BLOOD PRESSURE: 122 MMHG | DIASTOLIC BLOOD PRESSURE: 75 MMHG | HEART RATE: 77 BPM

## 2022-10-24 DIAGNOSIS — R73.9 STEROID-INDUCED HYPERGLYCEMIA: ICD-10-CM

## 2022-10-24 DIAGNOSIS — Z94.2 S/P LUNG TRANSPLANT (H): ICD-10-CM

## 2022-10-24 DIAGNOSIS — T38.0X5A STEROID-INDUCED HYPERGLYCEMIA: ICD-10-CM

## 2022-10-24 DIAGNOSIS — R19.7 DIARRHEA, UNSPECIFIED TYPE: ICD-10-CM

## 2022-10-24 DIAGNOSIS — I10 ESSENTIAL HYPERTENSION: ICD-10-CM

## 2022-10-24 DIAGNOSIS — Z94.2 LUNG REPLACED BY TRANSPLANT (H): Primary | ICD-10-CM

## 2022-10-24 DIAGNOSIS — Z99.2 ESRD (END STAGE RENAL DISEASE) ON DIALYSIS (H): ICD-10-CM

## 2022-10-24 DIAGNOSIS — N18.6 ESRD (END STAGE RENAL DISEASE) ON DIALYSIS (H): ICD-10-CM

## 2022-10-24 LAB
ANION GAP SERPL CALCULATED.3IONS-SCNC: 13 MMOL/L (ref 7–15)
BUN SERPL-MCNC: 78.1 MG/DL (ref 8–23)
CALCIUM SERPL-MCNC: 9.8 MG/DL (ref 8.8–10.2)
CHLORIDE SERPL-SCNC: 90 MMOL/L (ref 98–107)
CMV DNA SPEC NAA+PROBE-ACNC: NOT DETECTED IU/ML
CREAT SERPL-MCNC: 3.01 MG/DL (ref 0.51–0.95)
DEPRECATED HCO3 PLAS-SCNC: 29 MMOL/L (ref 22–29)
ERYTHROCYTE [DISTWIDTH] IN BLOOD BY AUTOMATED COUNT: 16.9 % (ref 10–15)
GFR SERPL CREATININE-BSD FRML MDRD: 17 ML/MIN/1.73M2
GLUCOSE SERPL-MCNC: 154 MG/DL (ref 70–99)
HCT VFR BLD AUTO: 30.3 % (ref 35–47)
HGB BLD-MCNC: 9.9 G/DL (ref 11.7–15.7)
IGG SERPL-MCNC: 727 MG/DL (ref 610–1616)
MAGNESIUM SERPL-MCNC: 2.4 MG/DL (ref 1.7–2.3)
MCH RBC QN AUTO: 34.3 PG (ref 26.5–33)
MCHC RBC AUTO-ENTMCNC: 32.7 G/DL (ref 31.5–36.5)
MCV RBC AUTO: 105 FL (ref 78–100)
PLATELET # BLD AUTO: 288 10E3/UL (ref 150–450)
POTASSIUM SERPL-SCNC: 3.8 MMOL/L (ref 3.4–5.3)
RBC # BLD AUTO: 2.89 10E6/UL (ref 3.8–5.2)
SODIUM SERPL-SCNC: 132 MMOL/L (ref 136–145)
TACROLIMUS BLD-MCNC: 10.4 UG/L (ref 5–15)
TME LAST DOSE: NORMAL H
TME LAST DOSE: NORMAL H
WBC # BLD AUTO: 6.8 10E3/UL (ref 4–11)

## 2022-10-24 PROCEDURE — 99607 MTMS BY PHARM ADDL 15 MIN: CPT | Mod: 93 | Performed by: PHARMACIST

## 2022-10-24 PROCEDURE — 82784 ASSAY IGA/IGD/IGG/IGM EACH: CPT | Performed by: PHYSICIAN ASSISTANT

## 2022-10-24 PROCEDURE — 85027 COMPLETE CBC AUTOMATED: CPT | Performed by: PATHOLOGY

## 2022-10-24 PROCEDURE — 36415 COLL VENOUS BLD VENIPUNCTURE: CPT | Performed by: PATHOLOGY

## 2022-10-24 PROCEDURE — 83735 ASSAY OF MAGNESIUM: CPT | Performed by: PATHOLOGY

## 2022-10-24 PROCEDURE — 80048 BASIC METABOLIC PNL TOTAL CA: CPT | Performed by: PATHOLOGY

## 2022-10-24 PROCEDURE — 87799 DETECT AGENT NOS DNA QUANT: CPT | Performed by: PHYSICIAN ASSISTANT

## 2022-10-24 PROCEDURE — 80197 ASSAY OF TACROLIMUS: CPT | Performed by: PHYSICIAN ASSISTANT

## 2022-10-24 PROCEDURE — 99606 MTMS BY PHARM EST 15 MIN: CPT | Mod: 93 | Performed by: PHARMACIST

## 2022-10-24 NOTE — PROGRESS NOTES
Medication Therapy Management (MTM) Encounter    ASSESSMENT:                            Medication Adherence/Access: No issues identified    Lung Transplant:  Discussed vaccinations, patient could consider COVID booster at this point.    Steroid induced hyperglycemia:  Stable. Majority of BP <200 during 18 hour tube feeding.      Diarrhea: Stable. Diarrhea likely due to tube feeding.     ESRD: Stable.     Hypertension: Last BP at goal <140/90. No changes today.    PLAN:                            1. Consider updated COVID booster.    Follow-up:as needed    SUBJECTIVE/OBJECTIVE:                          Sofie Rodriguez is a 62 year old female called for a transitions of care visit. She was discharged from [facility] on 10/8 for SOB.      Reason for visit: 4 months post transplant.    Allergies/ADRs: Reviewed in chart  Past Medical History: Reviewed in chart  Tobacco: She reports that she quit smoking about 1 years ago. Her smoking use included cigarettes. She has never used smokeless tobacco.  Alcohol: not currently using    Medication Adherence/Access: no issues reported    Lung Transplant:  Current immunosuppressants include Azathioprine 20mL (100mg) daily, Prednisone 7.5mg twice daily, and Tacrolimus 3.5mg (3.5mL) twice daily.  Pt reports diarrhea.   Using oxygen at night only.   Vascular prophylaxis: Aspirin 81mg daily, denies GIB sx.   Transplant date: 6/28/22  Estimated Creatinine Clearance: 18.2 mL/min (A) (based on SCr of 3.01 mg/dL (H)).  CMV prophylaxis: completed.   PJP prophylaxis: Dapsone 50mg three times per week, Bactrim likely stopped due to potassium.   Thrush prophylaxis:Nystatin 10 mL twice daily. .  PPI use: Pantoprazole 40mg (20mL) twice daily. Denies GERD sx.   Supplements: Nephrovite liquid daily, Calcium/D twice daily, Vitamin B12 500mcg daily, Folic acid 1mg daily.  Tx Coordinator: Girma Sharpe, Using Med Card: Yes  Immunizations: annual flu shot 2022; [id number]:  2013, 2021; Prevnar 13/15/20:  "2015; TDaP:  2015; Shingrix: 2021x2, HBV: immunity per last titer, COVID: Pfizer-BioNTech x4     Steroid induced hyperglycemia:  Currently taking Novolog sliding scale ever four hours (140-164 1 units, 165-189 2 units etc). Patient is not experiencing side effects.  Blood sugar monitoring: every 4 hours. Ranges (patient reported):   Symptoms of low blood sugar? None at 60-65 so far.   Symptoms of high blood sugar? none  Noon to 6 am tube feeding.  Diet/Exercise: Tube feeding 18 hours, 12pm-6am, eating \"pleasure bites\" of solid food. Food can cause pain.         Lab Results   Component Value Date     A1C 5.8 06/28/2022     A1C 5.1 11/13/2020      Date FBG/ 2hours post Lunch/2hours post Dinner /2hours post OVernight   10/24 182      10/23 178 122 160 187   10/22 117 147 196 157   10/21 207 82 208 172     Date FBG/ 2hours post Lunch/2hours post Dinner /2hours post Overnight   8/22 173         8/21 154 130/153 150 112   8/20 200 119,160 160 205   8/19 (feeding tube clogged) 170 60 62 199      Diarrhea: PT is having 3 BMs daily. Uses Imodium 2mg twice daily for this, fiber TID. Stools have improved but still loose.     ESRD: Getting dialysis on Tues, Thurs, Sat.     Hypertension: Current medications include Metoprolol 25mg twice daily.  Patient does self-monitor blood pressure. This morning 122/75, pulse 77.  Patient reports no current medication side effects. Ziopatch recent read by Dr. Leroy, no issues. Does not need Anticoagulation.  BP Readings from Last 3 Encounters:   10/24/22 122/75   10/12/22 (!) 148/77   10/08/22 120/75     Today's Vitals: /75   Pulse 77   ----------------  Post Discharge Medication Reconciliation Status: discharge medications reconciled, continue medications without change.    I spent 20 minutes with this patient today. A copy of the visit note was provided to the patient's provider(s).    The patient was sent via [employer] a summary of these recommendations.     Yung Rivera, " PharmD  [facility] Pharmacist    Phone: [phone]     Telemedicine Visit Details  Type of service:  Telephone visit  Start Time: 11:08 AM  End Time: 11:28 AM  Originating Location (pt. Location): Home      Distant Location (provider location):  On-site  Provider has received verbal consent for a visit from the patient? Yes     Medication Therapy Recommendations  Lung replaced by transplant (H)    Rationale: Preventive therapy - Needs additional medication therapy - Indication   Recommendation: Order Vaccine - Moderna COVID-19 Vaccine 100 MCG/0.5ML Susp   Status: Patient Agreed - Adherence/Education

## 2022-10-24 NOTE — PATIENT INSTRUCTIONS
"Recommendations from today's MTM visit:                                                       1. Consider updated COVID booster.    Follow-up: as needed    It was great speaking with you today.  I value your experience and would be very thankful for your time in providing feedback in our clinic survey. In the next few days, you may receive an email or text message from [employer] with a link to a survey related to your  clinical pharmacist.\"     To schedule another MTM appointment, please call the clinic directly or you may call the MTM scheduling line at [phone] or toll-free at [phone].     My Clinical Pharmacist's contact information:                                                      Please feel free to contact me with any questions or concerns you have.      Yung Rivera, PharmD  MTM Pharmacist    Phone: [phone]     "

## 2022-10-24 NOTE — RESULT ENCOUNTER NOTE
Tacrolimus level 10.4 at 13 hours, on 10/24/22.  Goal 8-12.   Current dose 3.5 mg in AM, 3 mg in PM    No change in dose  [employer] message sent

## 2022-10-25 LAB
EBV DNA # SPEC NAA+PROBE: <500 COPIES/ML
EBV DNA SPEC NAA+PROBE-LOG#: <2.7 {LOG_COPIES}/ML

## 2022-10-25 NOTE — PROGRESS NOTES
[facility]  October 26, 2022         Assessment and Plan:   Sofie Rodriguez is a 62 y.o female s/p BSLT on 6/28/22 for COPD complicated by persistent bilateral pleural effusions, persistent CO2 retention, right hemidiaphragm palsy,  C. diff colitis, gastroparesis s/p GJ tube placement 7/27, GI bleed 2/2 pyloric ulcer, hemobilia s/p ERCP and MRCP, and persistent vasoplegia. Other history notable for HFpEF, NTM colonoization, hepatitis C, and methamphetamine use. Admitted 9/9-10/8 with persistent dyspnea, daily morning hemoptysis, and increased BLE edema with persistent, bilateral pleural effusions, diffuse bilateral groundglass changes, and more focal appearing LLL infiltrates on imaging. Treating with aggressive diuresis with some improvement in symptoms and hypoxia. Mild intermittent hypoxia and ANAYA persisted s/p bilateral chest tubes. Tunneled line placed and iHD initiation 9/26. This is a routine follow up.     1. S/p bilateral sequential lung transplant:  Acute hypoxia with chronic hypercapnia:   Pulmonary edema:  Persistent bibasilar pleural effusions:   Right hemidiaphragm palsy: no new pulmonary complaints, progressing with her endurance in pulmonary rehab. Sating 94% on room air. Using 1L NC overnight.  Overnight oximetry (10/3) incomplete given need for 1L NC with desaturation (unfortunately not reordered prior to discharge). CMV 10/24 negative. CXR reviewed today and demonstrates stable left effusion. PFTs improved, but did not meet ATS guidelines  - Supplemental O2 of 1 L overnight, will repeat overnight O2 study on room air, consider sleep study for hypercapnia  - On three drug IS including zathioprine 100 mg daily, tacrolimus (goal 8-12) and prednisone taper, next due on 11/10    Date AM dose (mg) PM dose (mg)   9/15/22 10 7.5   10/13/22 7.5 7.5   11/10/22 7.5 5   12/8/22 5 5   1/5/23 5 2.5      Prophylaxis:   - Dapsone qMWF for PJP ppx (Bactrim  stopped d/t hyperkalemia; Pentamidine neb not tolerated on 9/7 after only 5 minutes d/t excessive coughing)     2. Positive DSA: positive on 8/10 with DQB2 mfi 2155, last MFI ? 8K up for 5K.  - Recheck DSA next week    3. EBV viremia: negative on 10/24.     4. Hypogammaglobulinemia: IgG adequate at time of transplant, repeat level low (336) on 7/28.  S/p IVIG 7/30, tolerated well.  IgG adequate at 7/27 on 10/4.     5. ESRD based upon Cystatin C (GFR of 10): s/p tunneled line placed and HD initiated 9/26 on T/Th/Sat schedule.     6. Diarrhea: C diff negative, transitioned from MMF to AZA as above. Stools were solid today.   - Continue fiber and imodium PRN    7. Severe gastroparesis:   Pyloric ulcer: gastric emptying study 7/20 with severe gastric emptying delay, s/p GJ tube placement in IR 7/27.  S/p EGD 8/11 with mild erythema 2/2 GJ tube trauma seen near insertion site but no active bleeding. Repeat gastric emptying study 9/30 unfortunately with persistent severe retention (91% at 4h); defer adjustments to current plan and consider semi-permanent status. S/p ERCP 10/7 with removal of stents and sludge; PEG/J tube replaced at that time.   - PPI BID  - TF cycled 18 hours; and ALL enteral medications via J tube  - G tube to gravity drainage overnight and prn during the day with GI symptoms; full liquid diet for comfort only  - Will need GI follow up (will try and make it with Dr. Navarro), consider repeat gastric emptying study in 3-6 months    RTC: 2 -3 weeks with bronch  Vaccinations: flu shot today; Evusheld > February 2023; recommend bivalent covid vaccine  Annual dermatology visit: need to address    Kaylin Triana PA-C  Pulmonary, Allergy, Critical Care and Sleep Medicine        Interval History:     Feeling well, moving faster and longer, can do stairs. Still doing pulmonary rehab once/week, doing Nustep X 20 minutes, leg press. Has not started the treadmill. No new or unexpected shortness of breath. Using 1 O2 at  night. No fever or chills, no sinus congestion or drainage. Minimal cough, mainly dry. No new chest pain, incision is healing well. Still doing TF 18 hours per day, bites for comfort. First formed BM today.          Review of Systems:   Please see HPI, otherwise the complete 10 point ROS is negative.           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     Hepatitis 2017    Hep C, [facility]     HTN (hypertension)      Osteopenia      Past Surgical History:   Procedure Laterality Date     BRONCHOSCOPY (RIGID OR FLEXIBLE), DIAGNOSTIC N/A 8/2/2022    Procedure: BRONCHOSCOPY, DIAGNOSTIC- inspection Bronch;  Surgeon: Kamala Lovell MD;  Location:  GI     BRONCHOSCOPY (RIGID OR FLEXIBLE), DIAGNOSTIC N/A 9/13/2022    Procedure: INSPECTION BRONCHOSCOPY, WITH BRONCHOALVEOLAR LAVAGE;  Surgeon: Jose R Mccullough MD;  Location:  GI     BRONCHOSCOPY FLEXIBLE AND RIGID N/A 07/19/2022    Procedure: BRONCHOSCOPY inspection only;  Surgeon: Bob Liao MD;  Location:  GI     COLONOSCOPY  2015     CV CORONARY ANGIOGRAM N/A 06/30/2021    Procedure: CV CORONARY ANGIOGRAM;  Surgeon: Alexander Cuellar MD;  Location:  HEART CARDIAC CATH LAB     CV RIGHT HEART CATH MEASUREMENTS RECORDED N/A 06/30/2021    Procedure: CV RIGHT HEART CATH;  Surgeon: Alexander Cuellar MD;  Location:  HEART CARDIAC CATH LAB     ENDOSCOPIC RETROGRADE CHOLANGIOPANCREATOGRAM N/A 8/11/2022    Procedure: ENDOSCOPIC RETROGRADE CHOLANGIOPANCREATOGRAPHY WITH PANCREATIC DUCT NEEDLE KNIFE AND STENT PLACEMENT, BILE DUCT SPHINCTEROTOMY, BLOOD/DEBRIS REMOVAL AND STENT PLACEMENT;  Surgeon: Cosmo Arroyo MD;  Location:  OR     ENDOSCOPIC RETROGRADE CHOLANGIOPANCREATOGRAM N/A 10/7/2022    Procedure: ENDOSCOPIC RETROGRADE  CHOLANGIOPANCREATOGRAPHY with biliary and pancreatic stent removal, debris removal;  Surgeon: Cosmo Arroyo MD;  Location: [facility] OR     ENT SURGERY  1974    tonsillectomy     ENTEROSCOPY SMALL BOWEL N/A 8/11/2022    Procedure: SMALL BOWEL ENTEROSCOPY;  Surgeon: Cosmo Arroyo MD;  Location: [facility] OR     ESOPHAGOGASTRODUODENOSCOPY, WITH NASOGASTRIC TUBE INSERTION N/A 07/01/2022    Procedure: ESOPHAGOGASTRODUODENOSCOPY, WITH NASOJEJUNAL TUBE INSERTION;  Surgeon: Ozzy Nickerson MD;  [facility]     ESOPHAGOSCOPY, GASTROSCOPY, DUODENOSCOPY (EGD), COMBINED N/A 8/3/2022    Procedure: ESOPHAGOGASTRODUODENOSCOPY (EGD);  Surgeon: Ira Andres MD;  [facility]: [facility]     HAND SURGERY       INSERT CHEST TUBE Right 9/13/2022    Procedure: Insert chest tube;  Surgeon: Jose R Mccullough MD;  Location:  GI     IR CVC TUNNEL PLACEMENT > 5 YRS OF AGE  9/26/2022     IR GASTRO JEJUNOSTOMY TUBE CHANGE  8/31/2022     IR GASTRO JEJUNOSTOMY TUBE PLACEMENT  7/27/2022     IR THORACENTESIS  8/29/2022     LEEP TX, CERVICAL  04/07/2017    HECTOR III     LYMPH NODE BIOPSY Left 2005    Left axilla, benign- [place]     MIDLINE INSERTION - DOUBLE LUMEN Left 07/28/2022    20cm, Basilic vein     REPLACE GASTROJEJUNOSTOMY TUBE, PERCUTANEOUS  10/7/2022    Procedure: Replace Gastrojejunostomy Tube;  Surgeon: Cosmo Arroyo MD;  Location: [facility]     THORACENTESIS Left 8/29/2022    Procedure: THORACENTESIS;  Surgeon: Bo Capone PA-C;  Location: [facility] OR     THORACENTESIS Left 9/13/2022    Procedure: Thoracentesis;  Surgeon: Jose R Mccullough MD;  Location: [facility] GI     THROMBECTOMY UPPER EXTREMITY Left 07/02/2022    Procedure: LEFT RADIAL ARM THROMBECTOMY;  Surgeon: Christie Graham MD;  Location: [facility] OR     TRANSPLANT LUNG RECIPIENT SINGLE X2 Bilateral 06/28/2022    Procedure: Clamshell Incision, Bilateral Sequential Lung Transplant, On Cardiopulmonary Bypass, Flexible Bronchoscopy;  Surgeon:  Sue Sunshine MD;  Location:  OR           Family History:     No family history on file.         Social History:     Social History     Socioeconomic History     Marital status: Single     Spouse name: Not on file     Number of children: Not on file     Years of education: Not on file     Highest education level: Not on file   Occupational History     Not on file   Tobacco Use     Smoking status: Former     Years: 30.00     Types: Cigarettes     Quit date: 2020     Years since quittin.9     Smokeless tobacco: Never   Substance and Sexual Activity     Alcohol use: Not Currently     Drug use: Not Currently     Types: Marijuana, Methamphetamines     Comment: hx:marijuana and methamphetamine-quit both unsure ?  2-3 yrs ago     Sexual activity: Not on file   Other Topics Concern     Parent/sibling w/ CABG, MI or angioplasty before 65F 55M? Not Asked   Social History Narrative     Not on file     Social Determinants of Health     Financial Resource Strain: Not on file   Food Insecurity: Not on file   Transportation Needs: Not on file   Physical Activity: Not on file   Stress: Not on file   Social Connections: Not on file   Intimate Partner Violence: Not on file   Housing Stability: Not on file            Medications:     Current Outpatient Medications   Medication     alcohol swab prep pads     azaTHIOprine (IMURAN) 5 mg/mL SUSP     B and C vitamin Complex with folic acid (NEPHRONEX) 0.9 MG/5ML LIQD liquid     blood glucose (CONTOUR NEXT TEST) test strip     blood glucose (NO BRAND SPECIFIED) lancets standard     blood glucose monitoring (CONTOUR NEXT MONITOR W/DEVICE KIT) meter device kit     calcium carbonate 600 mg-vitamin D 400 units (CALTRATE) 600-400 MG-UNIT per tablet     cyanocobalamin (CYANOCOBALAMIN) 500 MCG tablet     dapsone 2 mg/mL SUSP     folic acid (FOLVITE) 1 MG tablet     Guar Gum (FIBER MODULAR, NUTRISOURCE FIBER,) packet     insulin aspart (NOVOLOG PEN) 100 UNIT/ML pen     insulin pen  "needle (31G X 5 MM) 31G X 5 MM miscellaneous     loperamide (IMODIUM A-D) 2 MG tablet     metoprolol tartrate (LOPRESSOR) 50 MG tablet     Nutritional Supplements (GLUCOSE MANAGEMENT) TABS     nystatin (MYCOSTATIN) 790603 UNIT/ML suspension     ondansetron (ZOFRAN ODT) 4 MG ODT tab     pantoprazole (PROTONIX) 2 mg/mL SUSP suspension     predniSONE (DELTASONE) 5 MG tablet     protein modular (PROSOURCE TF) LIQD     [employer]arps Container MISC     tacrolimus (GENERIC EQUIVALENT) 1 mg/mL suspension     No current facility-administered medications for this visit.            Physical Exam:   /73   Pulse 98   Resp 17   Ht 1.588 m (5' 2.5\")   Wt 66.5 kg (146 lb 8 oz)   SpO2 94%   BMI 26.37 kg/m      GENERAL: alert, NAD  HEENT: NCAT, EOMI, no scleral icterus, oral mucosa moist and without lesions  Neck: no cervical or supraclavicular adenopathy  Lungs: moderate airflow; mild basilar crackles  CV: RRR, S1S2, + murmur  Abdomen: normoactive BS, soft, non tender  Lymph: no edema  Neuro: AAO X 3, CN 2-12 grossly intact  Psychiatric: normal affect, good eye contact  Skin: no rash, jaundice or lesions on limited exam         Data:   All laboratory and imaging data reviewed.      Recent Results (from the past 168 hour(s))   IgG    Collection Time: 10/24/22  9:11 AM   Result Value Ref Range    Immunoglobulin G 727 610 - 1,616 mg/dL   EBV DNA PCR Quantitative Whole Blood    Collection Time: 10/24/22  9:11 AM   Result Value Ref Range    EBV DNA Copies/mL <500 (A) Not Detected copies/mL    EBV log <2.7    Tacrolimus by Tandem Mass Spectrometry    Collection Time: 10/24/22  9:11 AM   Result Value Ref Range    Tacrolimus by Tandem Mass Spectrometry 10.4 5.0 - 15.0 ug/L    Tacrolimus Last Dose Date 10/23/2022     Tacrolimus Last Dose Time  8:10 PM    CMV Quantitative, PCR (Blood)    Collection Time: 10/24/22  9:11 AM    Specimen: Arm, Left; Blood   Result Value Ref Range    CMV DNA IU/mL Not Detected Not Detected IU/mL   CBC with " platelets    Collection Time: 10/24/22  9:11 AM   Result Value Ref Range    WBC Count 6.8 4.0 - 11.0 10e3/uL    RBC Count 2.89 (L) 3.80 - 5.20 10e6/uL    Hemoglobin 9.9 (L) 11.7 - 15.7 g/dL    Hematocrit 30.3 (L) 35.0 - 47.0 %     (H) 78 - 100 fL    MCH 34.3 (H) 26.5 - 33.0 pg    MCHC 32.7 31.5 - 36.5 g/dL    RDW 16.9 (H) 10.0 - 15.0 %    Platelet Count 288 150 - 450 10e3/uL   Magnesium    Collection Time: 10/24/22  9:11 AM   Result Value Ref Range    Magnesium 2.4 (H) 1.7 - 2.3 mg/dL   Basic metabolic panel    Collection Time: 10/24/22  9:11 AM   Result Value Ref Range    Sodium 132 (L) 136 - 145 mmol/L    Potassium 3.8 3.4 - 5.3 mmol/L    Chloride 90 (L) 98 - 107 mmol/L    Carbon Dioxide (CO2) 29 22 - 29 mmol/L    Anion Gap 13 7 - 15 mmol/L    Urea Nitrogen 78.1 (H) 8.0 - 23.0 mg/dL    Creatinine 3.01 (H) 0.51 - 0.95 mg/dL    Calcium 9.8 8.8 - 10.2 mg/dL    Glucose 154 (H) 70 - 99 mg/dL    GFR Estimate 17 (L) >60 mL/min/1.73m2   General PFT Lab (Please always keep checked)    Collection Time: 10/26/22 11:38 AM   Result Value Ref Range    FVC-Pred 3.06 L    FVC-Pre 1.35 L    FVC-%Pred-Pre 44 %    FEV1-Pre 1.30 L    FEV1-%Pred-Pre 53 %    FEV1FVC-Pred 79 %    FEV1FVC-Pre 96 %    FEFMax-Pred 6.14 L/sec    FEFMax-Pre 4.21 L/sec    FEFMax-%Pred-Pre 68 %    FEF2575-Pred 2.22 L/sec    FEF2575-Pre 2.89 L/sec    AUG3469-%Pred-Pre 130 %    ExpTime-Pre 4.38 sec    FIFMax-Pre 3.52 L/sec    FEV1FEV6-Pred 81 %    FEV1FEV6-Pre 96 %     PFT interpretation:  Maneuver: valid, but did not meet ATS guidelines

## 2022-10-25 NOTE — PROGRESS NOTES
Transplant Coordinator Note    Reason for visit: Post lung transplant follow up visit   Coordinator: Present   Caregiver:  Nadja Mayo     Health concerns addressed today:  1. ENT: At baseline.   2. Respiratory: Feeling really good. No new or or unexpected shortness of breath. Coughing very little. No mucous.   3. GI: Eating has been going pretty well. First formed stool today.   4. Mood: At baseline.     Activity/rehab: Pul rehab. Once a week. Pt is doing stairs now. Does nu-step 20 minutes, leg press, doing weighted exercises at pul rehab.  Oxygen needs: Only using oxygen at night, 1L.  Pain management/RX: tylenol and oxycodone as needed, incisional pain from time to time.   Diabetic management: on insulin- Novolog  Next Bronch due: Last bronch 9/13. Due next 11/13  Risk Criteria Labs: negative 7/28/22  CMV status: D+/R+  Valcyte stopped: POD 8-90  EBV status: D+/R+  AC/asa:  ASA discontinued after recent hospitalization   PJP prophylactic: Dapsone   Diet: Full liquid for pleasure. TF 12:00 noon-6AM (18 hours/day)  COVID:  1. COVID-19 infection (yes/no, date of most recent positive test):    2. Status/instructions given about COVID-19 vaccine: Vaccinated, booster x2 last 3/21/22. Received Evusheld 8/24/2022. Next due 2/24/2023. Received flu shot 10/12/22.     Pt Education: medications (use/dose/side effects), how/when to call coordinator, frequency of labs, s/s of infection/rejection, call prior to starting any new medications, lab/vital sign book    Health Maintenance:     Last colonoscopy:     Next colonoscopy due:     Dermatology:    Vaccinations this visit:     Labs, CXR, PFTs reviewed with patient  Medication record reviewed and reconciled  Questions and concerns addressed    Patient Instructions  1. Continue to hydrate with 60-70 oz fluids daily.  2. Continue to exercise daily or most days of the week.  3. Follow up with your primary care provider for annual gender health maintenance procedures.  4. Follow  up with colonoscopy schedule.  5. Follow up with annual dermatology visits.  6. It doesn't seem like the COVID vaccine is working well in lung transplant patients. A number of lung transplant patients have gotten sick with COVID even after receiving the vaccines.  Based on our recent experience, it can be life-threatening to get COVID  even after being vaccinated. Please continue to act like you did not get the COVID vaccine - social distancing, wearing a mask, good hand hygiene, etc. If the people around you are vaccinated, it will help reduce the risk of you getting COVID. All members of your household should be vaccinated.  7. Continue to vent your g-tube overnight.   8. Girma will schedule you for a bronch after your next clinic visit.   9. Okay stop taking the Gabapentin once you run out of current prescription.   10. November 10th is your next steroid taper date.   11. Okay to go home over the weekend (since you have dialysis Saturdays- you can go home after dialysis, return back to [place] Monday)    Next transplant clinic appointment:  2-3 weeks with CXR, labs and PFTs   Next lab draw: next week      AVS printed at time of check out

## 2022-10-26 ENCOUNTER — OFFICE VISIT (OUTPATIENT)
Dept: PULMONOLOGY | Facility: CLINIC | Age: 62
End: 2022-10-26
Attending: PHYSICIAN ASSISTANT
Payer: MEDICARE

## 2022-10-26 ENCOUNTER — ANCILLARY PROCEDURE (OUTPATIENT)
Dept: GENERAL RADIOLOGY | Facility: CLINIC | Age: 62
End: 2022-10-26
Attending: PHYSICIAN ASSISTANT
Payer: MEDICARE

## 2022-10-26 VITALS
RESPIRATION RATE: 17 BRPM | WEIGHT: 146.5 LBS | OXYGEN SATURATION: 94 % | HEIGHT: 63 IN | BODY MASS INDEX: 25.96 KG/M2 | DIASTOLIC BLOOD PRESSURE: 73 MMHG | SYSTOLIC BLOOD PRESSURE: 126 MMHG | HEART RATE: 98 BPM

## 2022-10-26 DIAGNOSIS — E53.8 B12 DEFICIENCY: ICD-10-CM

## 2022-10-26 DIAGNOSIS — Z23 NEED FOR VACCINATION: ICD-10-CM

## 2022-10-26 DIAGNOSIS — R52 GENERALIZED PAIN: ICD-10-CM

## 2022-10-26 DIAGNOSIS — Z94.2 LUNG REPLACED BY TRANSPLANT (H): Primary | ICD-10-CM

## 2022-10-26 DIAGNOSIS — Z94.2 LUNG REPLACED BY TRANSPLANT (H): ICD-10-CM

## 2022-10-26 LAB
EXPTIME-PRE: 4.38 SEC
FEF2575-%PRED-PRE: 130 %
FEF2575-PRE: 2.89 L/SEC
FEF2575-PRED: 2.22 L/SEC
FEFMAX-%PRED-PRE: 68 %
FEFMAX-PRE: 4.21 L/SEC
FEFMAX-PRED: 6.14 L/SEC
FEV1-%PRED-PRE: 53 %
FEV1-PRE: 1.3 L
FEV1FEV6-PRE: 96 %
FEV1FEV6-PRED: 81 %
FEV1FVC-PRE: 96 %
FEV1FVC-PRED: 79 %
FIFMAX-PRE: 3.52 L/SEC
FVC-%PRED-PRE: 44 %
FVC-PRE: 1.35 L
FVC-PRED: 3.06 L

## 2022-10-26 PROCEDURE — 99214 OFFICE O/P EST MOD 30 MIN: CPT | Mod: 25 | Performed by: PHYSICIAN ASSISTANT

## 2022-10-26 PROCEDURE — G0463 HOSPITAL OUTPT CLINIC VISIT: HCPCS | Mod: 25

## 2022-10-26 PROCEDURE — 94375 RESPIRATORY FLOW VOLUME LOOP: CPT | Performed by: PHYSICIAN ASSISTANT

## 2022-10-26 PROCEDURE — 71046 X-RAY EXAM CHEST 2 VIEWS: CPT | Mod: GC | Performed by: RADIOLOGY

## 2022-10-26 ASSESSMENT — PAIN SCALES - GENERAL: PAINLEVEL: NO PAIN (0)

## 2022-10-26 NOTE — LETTER
10/26/2022         RE: Sofie Rodriguez  [address]  [place] [address]        Dear Colleague,    Thank you for referring your patient, Sofie Rodriguez, to the [facility]. Please see a copy of my visit note below.    [facility]  October 26, 2022         Assessment and Plan:   Sofie Rodriguez is a 62 y.o female s/p BSLT on 6/28/22 for COPD complicated by persistent bilateral pleural effusions, persistent CO2 retention, right hemidiaphragm palsy,  C. diff colitis, gastroparesis s/p GJ tube placement 7/27, GI bleed 2/2 pyloric ulcer, hemobilia s/p ERCP and MRCP, and persistent vasoplegia. Other history notable for HFpEF, NTM colonoization, hepatitis C, and methamphetamine use. Admitted 9/9-10/8 with persistent dyspnea, daily morning hemoptysis, and increased BLE edema with persistent, bilateral pleural effusions, diffuse bilateral groundglass changes, and more focal appearing LLL infiltrates on imaging. Treating with aggressive diuresis with some improvement in symptoms and hypoxia. Mild intermittent hypoxia and ANAYA persisted s/p bilateral chest tubes. Tunneled line placed and iHD initiation 9/26. This is a routine follow up.     1. S/p bilateral sequential lung transplant:  Acute hypoxia with chronic hypercapnia:   Pulmonary edema:  Persistent bibasilar pleural effusions:   Right hemidiaphragm palsy: no new pulmonary complaints, progressing with her endurance in pulmonary rehab. Sating 94% on room air. Using 1L NC overnight.  Overnight oximetry (10/3) incomplete given need for 1L NC with desaturation (unfortunately not reordered prior to discharge). CMV 10/24 negative. CXR reviewed today and demonstrates stable left effusion. PFTs improved, but did not meet ATS guidelines  - Supplemental O2 of 1 L overnight, will repeat overnight O2 study on room air, consider sleep study for hypercapnia  -  On three drug IS including zathioprine 100 mg daily, tacrolimus (goal 8-12) and prednisone taper, next due on 11/10    Date AM dose (mg) PM dose (mg)   9/15/22 10 7.5   10/13/22 7.5 7.5   11/10/22 7.5 5   12/8/22 5 5   1/5/23 5 2.5      Prophylaxis:   - Dapsone qMWF for PJP ppx (Bactrim stopped d/t hyperkalemia; Pentamidine neb not tolerated on 9/7 after only 5 minutes d/t excessive coughing)     2. Positive DSA: positive on 8/10 with DQB2 mfi 2155, last MFI ? 8K up for 5K.  - Recheck DSA next week    3. EBV viremia: negative on 10/24.     4. Hypogammaglobulinemia: IgG adequate at time of transplant, repeat level low (336) on 7/28.  S/p IVIG 7/30, tolerated well.  IgG adequate at 7/27 on 10/4.     5. ESRD based upon Cystatin C (GFR of 10): s/p tunneled line placed and HD initiated 9/26 on T/Th/Sat schedule.     6. Diarrhea: C diff negative, transitioned from MMF to AZA as above. Stools were solid today.   - Continue fiber and imodium PRN    7. Severe gastroparesis:   Pyloric ulcer: gastric emptying study 7/20 with severe gastric emptying delay, s/p GJ tube placement in IR 7/27.  S/p EGD 8/11 with mild erythema 2/2 GJ tube trauma seen near insertion site but no active bleeding. Repeat gastric emptying study 9/30 unfortunately with persistent severe retention (91% at 4h); defer adjustments to current plan and consider semi-permanent status. S/p ERCP 10/7 with removal of stents and sludge; PEG/J tube replaced at that time.   - PPI BID  - TF cycled 18 hours; and ALL enteral medications via J tube  - G tube to gravity drainage overnight and prn during the day with GI symptoms; full liquid diet for comfort only  - Will need GI follow up (will try and make it with Dr. Navarro), consider repeat gastric emptying study in 3-6 months    RTC: 2 -3 weeks with bronch  Vaccinations: flu shot today; Evusheld > February 2023; recommend bivalent covid vaccine  Annual dermatology visit: need to address    Kaylin Triana PA-C  Pulmonary,  Allergy, Critical Care and Sleep Medicine        Interval History:     Feeling well, moving faster and longer, can do stairs. Still doing pulmonary rehab once/week, doing Nustep X 20 minutes, leg press. Has not started the treadmill. No new or unexpected shortness of breath. Using 1 O2 at night. No fever or chills, no sinus congestion or drainage. Minimal cough, mainly dry. No new chest pain, incision is healing well. Still doing TF 18 hours per day, bites for comfort. First formed BM today.          Review of Systems:   Please see HPI, otherwise the complete 10 point ROS is negative.           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     Hepatitis 2017    Hep C, [facility]     HTN (hypertension)      Osteopenia      Past Surgical History:   Procedure Laterality Date     BRONCHOSCOPY (RIGID OR FLEXIBLE), DIAGNOSTIC N/A 8/2/2022    Procedure: BRONCHOSCOPY, DIAGNOSTIC- inspection Bronch;  Surgeon: Kamala Lovell MD;  Location:  GI     BRONCHOSCOPY (RIGID OR FLEXIBLE), DIAGNOSTIC N/A 9/13/2022    Procedure: INSPECTION BRONCHOSCOPY, WITH BRONCHOALVEOLAR LAVAGE;  Surgeon: Jose R Mccullough MD;  Location:  GI     BRONCHOSCOPY FLEXIBLE AND RIGID N/A 07/19/2022    Procedure: BRONCHOSCOPY inspection only;  Surgeon: Bob Liao MD;  Location:  GI     COLONOSCOPY  2015     CV CORONARY ANGIOGRAM N/A 06/30/2021    Procedure: CV CORONARY ANGIOGRAM;  Surgeon: Alexander Cuellar MD;  Location:  HEART CARDIAC CATH LAB     CV RIGHT HEART CATH MEASUREMENTS RECORDED N/A 06/30/2021    Procedure: CV RIGHT HEART CATH;  Surgeon: Alexander Cuellar MD;  Location:  HEART CARDIAC CATH LAB     ENDOSCOPIC RETROGRADE CHOLANGIOPANCREATOGRAM N/A 8/11/2022    Procedure: ENDOSCOPIC RETROGRADE CHOLANGIOPANCREATOGRAPHY WITH  PANCREATIC DUCT NEEDLE KNIFE AND STENT PLACEMENT, BILE DUCT SPHINCTEROTOMY, BLOOD/DEBRIS REMOVAL AND STENT PLACEMENT;  Surgeon: Cosmo Arroyo MD;  Location: [facility] OR     ENDOSCOPIC RETROGRADE CHOLANGIOPANCREATOGRAM N/A 10/7/2022    Procedure: ENDOSCOPIC RETROGRADE CHOLANGIOPANCREATOGRAPHY with biliary and pancreatic stent removal, debris removal;  Surgeon: Cosmo Arroyo MD;  Location: [facility] OR     ENT SURGERY  1974    tonsillectomy     ENTEROSCOPY SMALL BOWEL N/A 8/11/2022    Procedure: SMALL BOWEL ENTEROSCOPY;  Surgeon: Cosmo Arroyo MD;  Location: [facility] OR     ESOPHAGOGASTRODUODENOSCOPY, WITH NASOGASTRIC TUBE INSERTION N/A 07/01/2022    Procedure: ESOPHAGOGASTRODUODENOSCOPY, WITH NASOJEJUNAL TUBE INSERTION;  Surgeon: Ozzy Nickerson MD;  [facility]     ESOPHAGOSCOPY, GASTROSCOPY, DUODENOSCOPY (EGD), COMBINED N/A 8/3/2022    Procedure: ESOPHAGOGASTRODUODENOSCOPY (EGD);  Surgeon: Ira Andres MD;  [facility]: [facility]     HAND SURGERY       INSERT CHEST TUBE Right 9/13/2022    Procedure: Insert chest tube;  Surgeon: Jose R Mccullough MD;  Location:  GI     IR CVC TUNNEL PLACEMENT > 5 YRS OF AGE  9/26/2022     IR GASTRO JEJUNOSTOMY TUBE CHANGE  8/31/2022     IR GASTRO JEJUNOSTOMY TUBE PLACEMENT  7/27/2022     IR THORACENTESIS  8/29/2022     LEEP TX, CERVICAL  04/07/2017    HECTOR III     LYMPH NODE BIOPSY Left 2005    Left axilla, benign- [facility]     MIDLINE INSERTION - DOUBLE LUMEN Left 07/28/2022    20cm, Basilic vein     REPLACE GASTROJEJUNOSTOMY TUBE, PERCUTANEOUS  10/7/2022    Procedure: Replace Gastrojejunostomy Tube;  Surgeon: Cosmo Arroyo MD;  [facility]     THORACENTESIS Left 8/29/2022    Procedure: THORACENTESIS;  Surgeon: Bo Capone PA-C;  Location: [facility] OR     THORACENTESIS Left 9/13/2022    Procedure: Thoracentesis;  Surgeon: Jose R Mccullough MD;  [facility]     THROMBECTOMY UPPER EXTREMITY Left 07/02/2022    Procedure: LEFT RADIAL  ARM THROMBECTOMY;  Surgeon: Christie Graham MD;  Location: [facility] OR     TRANSPLANT LUNG RECIPIENT SINGLE X2 Bilateral 2022    Procedure: Clamshell Incision, Bilateral Sequential Lung Transplant, On Cardiopulmonary Bypass, Flexible Bronchoscopy;  Surgeon: Sue Sunshine MD;  Location: [facility] OR           Family History:     No family history on file.         Social History:     Social History     Socioeconomic History     Marital status: Single     Spouse name: Not on file     Number of children: Not on file     Years of education: Not on file     Highest education level: Not on file   Occupational History     Not on file   Tobacco Use     Smoking status: Former     Years: 30.00     Types: Cigarettes     Quit date: 2020     Years since quittin.9     Smokeless tobacco: Never   Substance and Sexual Activity     Alcohol use: Not Currently     Drug use: Not Currently     Types: Marijuana, Methamphetamines     Comment: hx:marijuana and methamphetamine-quit both unsure ?  2-3 yrs ago     Sexual activity: Not on file   Other Topics Concern     Parent/sibling w/ CABG, MI or angioplasty before 65F 55M? Not Asked   Social History Narrative     Not on file     Social Determinants of Health     Financial Resource Strain: Not on file   Food Insecurity: Not on file   Transportation Needs: Not on file   Physical Activity: Not on file   Stress: Not on file   Social Connections: Not on file   Intimate Partner Violence: Not on file   Housing Stability: Not on file            Medications:     Current Outpatient Medications   Medication     alcohol swab prep pads     azaTHIOprine (IMURAN) 5 mg/mL SUSP     B and C vitamin Complex with folic acid (NEPHRONEX) 0.9 MG/5ML LIQD liquid     blood glucose (CONTOUR NEXT TEST) test strip     blood glucose (NO BRAND SPECIFIED) lancets standard     blood glucose monitoring (CONTOUR NEXT MONITOR W/DEVICE KIT) meter device kit     calcium carbonate 600 mg-vitamin D 400 units  "(CALTRATE) 600-400 MG-UNIT per tablet     cyanocobalamin (CYANOCOBALAMIN) 500 MCG tablet     dapsone 2 mg/mL SUSP     folic acid (FOLVITE) 1 MG tablet     Guar Gum (FIBER MODULAR, NUTRISOURCE FIBER,) packet     insulin aspart (NOVOLOG PEN) 100 UNIT/ML pen     insulin pen needle (31G X 5 MM) 31G X 5 MM miscellaneous     loperamide (IMODIUM A-D) 2 MG tablet     metoprolol tartrate (LOPRESSOR) 50 MG tablet     Nutritional Supplements (GLUCOSE MANAGEMENT) TABS     nystatin (MYCOSTATIN) 190094 UNIT/ML suspension     ondansetron (ZOFRAN ODT) 4 MG ODT tab     pantoprazole (PROTONIX) 2 mg/mL SUSP suspension     predniSONE (DELTASONE) 5 MG tablet     protein modular (PROSOURCE TF) LIQD     Sharps Container MISC     tacrolimus (GENERIC EQUIVALENT) 1 mg/mL suspension     No current facility-administered medications for this visit.            Physical Exam:   /73   Pulse 98   Resp 17   Ht 1.588 m (5' 2.5\")   Wt 66.5 kg (146 lb 8 oz)   SpO2 94%   BMI 26.37 kg/m      GENERAL: alert, NAD  HEENT: NCAT, EOMI, no scleral icterus, oral mucosa moist and without lesions  Neck: no cervical or supraclavicular adenopathy  Lungs: moderate airflow; mild basilar crackles  CV: RRR, S1S2, + murmur  Abdomen: normoactive BS, soft, non tender  Lymph: no edema  Neuro: AAO X 3, CN 2-12 grossly intact  Psychiatric: normal affect, good eye contact  Skin: no rash, jaundice or lesions on limited exam         Data:   All laboratory and imaging data reviewed.      Recent Results (from the past 168 hour(s))   IgG    Collection Time: 10/24/22  9:11 AM   Result Value Ref Range    Immunoglobulin G 727 610 - 1,616 mg/dL   EBV DNA PCR Quantitative Whole Blood    Collection Time: 10/24/22  9:11 AM   Result Value Ref Range    EBV DNA Copies/mL <500 (A) Not Detected copies/mL    EBV log <2.7    Tacrolimus by Tandem Mass Spectrometry    Collection Time: 10/24/22  9:11 AM   Result Value Ref Range    Tacrolimus by Tandem Mass Spectrometry 10.4 5.0 - 15.0 " ug/L    Tacrolimus Last Dose Date 10/23/2022     Tacrolimus Last Dose Time  8:10 PM    CMV Quantitative, PCR (Blood)    Collection Time: 10/24/22  9:11 AM    Specimen: Arm, Left; Blood   Result Value Ref Range    CMV DNA IU/mL Not Detected Not Detected IU/mL   CBC with platelets    Collection Time: 10/24/22  9:11 AM   Result Value Ref Range    WBC Count 6.8 4.0 - 11.0 10e3/uL    RBC Count 2.89 (L) 3.80 - 5.20 10e6/uL    Hemoglobin 9.9 (L) 11.7 - 15.7 g/dL    Hematocrit 30.3 (L) 35.0 - 47.0 %     (H) 78 - 100 fL    MCH 34.3 (H) 26.5 - 33.0 pg    MCHC 32.7 31.5 - 36.5 g/dL    RDW 16.9 (H) 10.0 - 15.0 %    Platelet Count 288 150 - 450 10e3/uL   Magnesium    Collection Time: 10/24/22  9:11 AM   Result Value Ref Range    Magnesium 2.4 (H) 1.7 - 2.3 mg/dL   Basic metabolic panel    Collection Time: 10/24/22  9:11 AM   Result Value Ref Range    Sodium 132 (L) 136 - 145 mmol/L    Potassium 3.8 3.4 - 5.3 mmol/L    Chloride 90 (L) 98 - 107 mmol/L    Carbon Dioxide (CO2) 29 22 - 29 mmol/L    Anion Gap 13 7 - 15 mmol/L    Urea Nitrogen 78.1 (H) 8.0 - 23.0 mg/dL    Creatinine 3.01 (H) 0.51 - 0.95 mg/dL    Calcium 9.8 8.8 - 10.2 mg/dL    Glucose 154 (H) 70 - 99 mg/dL    GFR Estimate 17 (L) >60 mL/min/1.73m2   General PFT Lab (Please always keep checked)    Collection Time: 10/26/22 11:38 AM   Result Value Ref Range    FVC-Pred 3.06 L    FVC-Pre 1.35 L    FVC-%Pred-Pre 44 %    FEV1-Pre 1.30 L    FEV1-%Pred-Pre 53 %    FEV1FVC-Pred 79 %    FEV1FVC-Pre 96 %    FEFMax-Pred 6.14 L/sec    FEFMax-Pre 4.21 L/sec    FEFMax-%Pred-Pre 68 %    FEF2575-Pred 2.22 L/sec    FEF2575-Pre 2.89 L/sec    OFH0640-%Pred-Pre 130 %    ExpTime-Pre 4.38 sec    FIFMax-Pre 3.52 L/sec    FEV1FEV6-Pred 81 %    FEV1FEV6-Pre 96 %     PFT interpretation:  Maneuver: valid, but did not meet ATS guidelines    Transplant Coordinator Note    Reason for visit: Post lung transplant follow up visit   Coordinator: Present   Caregiver:  Nadja Alvarado  concerns addressed today:  1. ENT: At baseline.   2. Respiratory: Feeling really good. No new or or unexpected shortness of breath. Coughing very little. No mucous.   3. GI: Eating has been going pretty well. First formed stool today.   4. Mood: At baseline.     Activity/rehab: Pul rehab. Once a week. Pt is doing stairs now. Does nu-step 20 minutes, leg press, doing weighted exercises at pul rehab.  Oxygen needs: Only using oxygen at night, 1L.  Pain management/RX: tylenol and oxycodone as needed, incisional pain from time to time.   Diabetic management: on insulin- Novolog  Next Bronch due: Last bronch 9/13. Due next 11/13  Risk Criteria Labs: negative 7/28/22  CMV status: D+/R+  Valcyte stopped: POD 8-90  EBV status: D+/R+  AC/asa:  ASA discontinued after recent hospitalization   PJP prophylactic: Dapsone   Diet: Full liquid for pleasure. TF 12:00 noon-6AM (18 hours/day)  COVID:  1. COVID-19 infection (yes/no, date of most recent positive test):    2. Status/instructions given about COVID-19 vaccine: Vaccinated, booster x2 last 3/21/22. Received Evusheld 8/24/2022. Next due 2/24/2023. Received flu shot 10/12/22.     Pt Education: medications (use/dose/side effects), how/when to call coordinator, frequency of labs, s/s of infection/rejection, call prior to starting any new medications, lab/vital sign book    Health Maintenance:     Last colonoscopy:     Next colonoscopy due:     Dermatology:    Vaccinations this visit:     Labs, CXR, PFTs reviewed with patient  Medication record reviewed and reconciled  Questions and concerns addressed    Patient Instructions  1. Continue to hydrate with 60-70 oz fluids daily.  2. Continue to exercise daily or most days of the week.  3. Follow up with your primary care provider for annual gender health maintenance procedures.  4. Follow up with colonoscopy schedule.  5. Follow up with annual dermatology visits.  6. It doesn't seem like the COVID vaccine is working well in lung  transplant patients. A number of lung transplant patients have gotten sick with COVID even after receiving the vaccines.  Based on our recent experience, it can be life-threatening to get COVID  even after being vaccinated. Please continue to act like you did not get the COVID vaccine - social distancing, wearing a mask, good hand hygiene, etc. If the people around you are vaccinated, it will help reduce the risk of you getting COVID. All members of your household should be vaccinated.  7. Continue to vent your g-tube overnight.   8. Girma will schedule you for a bronch after your next clinic visit.   9. Okay stop taking the Gabapentin once you run out of current prescription.   10. November 10th is your next steroid taper date.   11. Okay to go home over the weekend (since you have dialysis Saturdays- you can go home after dialysis, return back to [place] Monday)    Next transplant clinic appointment:  2-3 weeks with CXR, labs and PFTs   Next lab draw: next week      AVS printed at time of check out            Again, thank you for allowing me to participate in the care of your patient.        Sincerely,        Kaylin Triana PA-C

## 2022-10-26 NOTE — PATIENT INSTRUCTIONS
Patient Instructions  1. Continue to hydrate with 60-70 oz fluids daily.  2. Continue to exercise daily or most days of the week.  3. Follow up with your primary care provider for annual gender health maintenance procedures.  4. Follow up with colonoscopy schedule.  5. Follow up with annual dermatology visits.  6. It doesn't seem like the COVID vaccine is working well in lung transplant patients. A number of lung transplant patients have gotten sick with COVID even after receiving the vaccines.  Based on our recent experience, it can be life-threatening to get COVID  even after being vaccinated. Please continue to act like you did not get the COVID vaccine - social distancing, wearing a mask, good hand hygiene, etc. If the people around you are vaccinated, it will help reduce the risk of you getting COVID. All members of your household should be vaccinated.  7. Continue to vent your g-tube overnight.   8. Girma will schedule you for a bronch after your next clinic visit.   9. Okay stop taking the Gabapentin once you run out of current prescription.   10. November 10th is your next steroid taper date.   11. Okay to go home over the weekend (since you have dialysis Saturdays- you can go home after dialysis, return back to [place] Monday)    Next transplant clinic appointment:  2-3 weeks with CXR, labs and PFTs   Next lab draw: next week      AVS printed at time of check out          ~~~~~~~~~~~~~~~~~~~~~~~~~    Thoracic Transplant Office phone [phone], fax [phone]    Office Hours 8:30 - 5:00     For after-hours urgent issues, please dial [phone], and ask to speak with the Thoracic Transplant Coordinator On-Call.  --------------------  To expedite your medication refill(s), please contact your pharmacy and have them fax a refill request to: [phone]  .   *Please allow 3 business days for routine medication refills.  *Please allow 5 business days for controlled substance medication refills.    **For  Diabetic medications and supplies refill(s), please contact your pharmacy and have them contact your Endocrine team.  --------------------  For scheduling appointments call [phone].  --------------------  Please Note: If you are active on [employer], all future test results will be sent by [employer] message only, and will no longer be called to patient. You may also receive communication directly from your physician.

## 2022-10-26 NOTE — LETTER
Date:October 27, 2022      Patient was self referred, no letter generated. Do not send.        [facility] Information

## 2022-10-28 ENCOUNTER — HOSPITAL ENCOUNTER (OUTPATIENT)
Dept: CARDIAC REHAB | Facility: CLINIC | Age: 62
Discharge: HOME | End: 2022-10-28
Attending: INTERNAL MEDICINE
Payer: MEDICARE

## 2022-10-28 DIAGNOSIS — Z94.2 LUNG REPLACED BY TRANSPLANT (H): Primary | ICD-10-CM

## 2022-10-28 PROCEDURE — G0239 OTH RESP PROC, GROUP: HCPCS

## 2022-10-31 ENCOUNTER — PATIENT OUTREACH (OUTPATIENT)
Dept: GASTROENTEROLOGY | Facility: CLINIC | Age: 62
End: 2022-10-31

## 2022-10-31 ENCOUNTER — PREP FOR PROCEDURE (OUTPATIENT)
Dept: GASTROENTEROLOGY | Facility: CLINIC | Age: 62
End: 2022-10-31

## 2022-10-31 DIAGNOSIS — K31.84 GASTROPARESIS: Primary | ICD-10-CM

## 2022-10-31 DIAGNOSIS — T38.0X5A STEROID-INDUCED HYPERGLYCEMIA: ICD-10-CM

## 2022-10-31 DIAGNOSIS — R73.9 STEROID-INDUCED HYPERGLYCEMIA: ICD-10-CM

## 2022-10-31 NOTE — PROGRESS NOTES
Called to discuss with patient.   Gastroparesis diagnosis, referred by pulmonary to see Dr Navarro. Per his recommendations will arrange clinic to discuss. Can do an EGD with botox as a trial prior to POEM.     Virtual clinic 1/11/23 at 7:40am arranged. Message sent to clinic coordinators to schedule  EGD 1/23/23 - will tentatively hold date and confirm once calendar is confirmed    Procedure/Imaging/Clinic: EGD with botox  Physician: Ramon  Timing: next available  Procedure length: per MD average  Anesthesia: MAC  Dx: gastroparesis  Tier: 2  Location: [facility] OR       [facility] they can expect a call from  for date and time of procedure, will need a , someone to stay with them for 24 hours and should stay in town for 24 hours (within 45 min of Hospital) post procedure    Patient needs to get pre-op physical completed. If outside  health system will need physical faxed to number [phone]   If you do not get a preop physical, your procedure could be cancelled, patient voiced understanding*    Preop Plan: Dr Berrios, [facility], will make that appointment within 30 days    Med Review    Blood thinner -  none  ASA - none  Diabetic - short acting insulin    COVID test discussed: yes, discussed at home testing    Patient Education r/t procedure:     Does patient have any history of gastric bypass/gastric surgery/altered panc/bili anatomy? none    A pre-op nurse will call 1-2 days prior to the procedure.    NPO/Prep:   Adults and Children of all ages may consume solids up to 8 hours prior to arrival time - may consume clear liquids up to 1 hour prior to arrival time.    Verbalized understanding of all instructions. All questions answered.     Procedure order placed, message routed to OR      Penny Vogel, RN, BSN,   [facility]  Care coordinator

## 2022-11-02 ENCOUNTER — LAB (OUTPATIENT)
Dept: LAB | Facility: CLINIC | Age: 62
End: 2022-11-02
Payer: MEDICARE

## 2022-11-02 DIAGNOSIS — Z94.2 S/P LUNG TRANSPLANT (H): Primary | ICD-10-CM

## 2022-11-02 DIAGNOSIS — Z94.2 LUNG REPLACED BY TRANSPLANT (H): ICD-10-CM

## 2022-11-02 DIAGNOSIS — K31.84 GASTROPARESIS: Primary | ICD-10-CM

## 2022-11-02 DIAGNOSIS — D63.1 ANEMIA OF CHRONIC RENAL FAILURE, STAGE 3B (H): ICD-10-CM

## 2022-11-02 DIAGNOSIS — N18.32 ANEMIA OF CHRONIC RENAL FAILURE, STAGE 3B (H): ICD-10-CM

## 2022-11-02 DIAGNOSIS — E53.8 B12 DEFICIENCY: ICD-10-CM

## 2022-11-02 LAB
ANION GAP SERPL CALCULATED.3IONS-SCNC: 12 MMOL/L (ref 7–15)
BUN SERPL-MCNC: 59.8 MG/DL (ref 8–23)
CALCIUM SERPL-MCNC: 9.6 MG/DL (ref 8.8–10.2)
CHLORIDE SERPL-SCNC: 90 MMOL/L (ref 98–107)
CREAT SERPL-MCNC: 2.61 MG/DL (ref 0.51–0.95)
DEPRECATED HCO3 PLAS-SCNC: 28 MMOL/L (ref 22–29)
ERYTHROCYTE [DISTWIDTH] IN BLOOD BY AUTOMATED COUNT: 15.9 % (ref 10–15)
GFR SERPL CREATININE-BSD FRML MDRD: 20 ML/MIN/1.73M2
GLUCOSE SERPL-MCNC: 163 MG/DL (ref 70–99)
HCT VFR BLD AUTO: 31.6 % (ref 35–47)
HGB BLD-MCNC: 10.2 G/DL (ref 11.7–15.7)
MAGNESIUM SERPL-MCNC: 2.2 MG/DL (ref 1.7–2.3)
MCH RBC QN AUTO: 34.7 PG (ref 26.5–33)
MCHC RBC AUTO-ENTMCNC: 32.3 G/DL (ref 31.5–36.5)
MCV RBC AUTO: 108 FL (ref 78–100)
PLATELET # BLD AUTO: 276 10E3/UL (ref 150–450)
POTASSIUM SERPL-SCNC: 4.2 MMOL/L (ref 3.4–5.3)
RBC # BLD AUTO: 2.94 10E6/UL (ref 3.8–5.2)
SODIUM SERPL-SCNC: 130 MMOL/L (ref 136–145)
TACROLIMUS BLD-MCNC: 9.8 UG/L (ref 5–15)
TME LAST DOSE: NORMAL H
TME LAST DOSE: NORMAL H
WBC # BLD AUTO: 6.5 10E3/UL (ref 4–11)

## 2022-11-02 PROCEDURE — 80197 ASSAY OF TACROLIMUS: CPT | Performed by: INTERNAL MEDICINE

## 2022-11-02 PROCEDURE — 36415 COLL VENOUS BLD VENIPUNCTURE: CPT | Performed by: PATHOLOGY

## 2022-11-02 PROCEDURE — 85027 COMPLETE CBC AUTOMATED: CPT | Performed by: PATHOLOGY

## 2022-11-02 PROCEDURE — 83921 ORGANIC ACID SINGLE QUANT: CPT | Performed by: PHYSICIAN ASSISTANT

## 2022-11-02 PROCEDURE — 83735 ASSAY OF MAGNESIUM: CPT | Performed by: PATHOLOGY

## 2022-11-02 PROCEDURE — 86833 HLA CLASS II HIGH DEFIN QUAL: CPT | Performed by: PHYSICIAN ASSISTANT

## 2022-11-02 PROCEDURE — 86832 HLA CLASS I HIGH DEFIN QUAL: CPT | Performed by: PHYSICIAN ASSISTANT

## 2022-11-02 PROCEDURE — 80048 BASIC METABOLIC PNL TOTAL CA: CPT | Performed by: PATHOLOGY

## 2022-11-02 RX ORDER — B COMPLEX C NO.10/FOLIC ACID 900MCG/5ML
5 LIQUID (ML) ORAL DAILY
Qty: 237 ML | Refills: 11 | Status: SHIPPED | OUTPATIENT
Start: 2022-11-02 | End: 2022-12-20

## 2022-11-02 NOTE — RESULT ENCOUNTER NOTE
Tacrolimus level 9.8 at 12.5 hours, on 11/2/22.  Goal 8-12.   Current dose 3.5 mg in AM, 3 mg in PM    No change in dose  [employer] message sent

## 2022-11-02 NOTE — PROGRESS NOTES
Pt has video visit 1/11/23 with Dr. Navarro    Per Kaylin Triana  -gastric emptying study to be done prior to video visit. Scheduled for 1/2/22 at 8:30AM

## 2022-11-03 ENCOUNTER — TELEPHONE (OUTPATIENT)
Dept: TRANSPLANT | Facility: CLINIC | Age: 62
End: 2022-11-03

## 2022-11-03 LAB
CW10: 1244
DONOR IDENTIFICATION: NORMAL
DQB2: NORMAL
DR53: 846
DSA COMMENTS: NORMAL
DSA PRESENT: YES
DSA TEST METHOD: NORMAL
ORGAN: NORMAL
SA 1 CELL: NORMAL
SA 1 TEST METHOD: NORMAL
SA 2 CELL: NORMAL
SA 2 TEST METHOD: NORMAL
SA1 HI RISK ABY: NORMAL
SA1 MOD RISK ABY: NORMAL
SA2 HI RISK ABY: NORMAL
SA2 MOD RISK ABY: NORMAL
UNACCEPTABLE ANTIGENS: NORMAL
UNOS CPRA: 37
ZZZSA 1  COMMENTS: NORMAL
ZZZSA 2 COMMENTS: NORMAL

## 2022-11-03 NOTE — TELEPHONE ENCOUNTER
Transplant Social Work Services Phone Call      Per transplant coordinator, patient having difficulty affording her nephronex.  18.99 co-pay.  Request assistance paying.  Contacted [facility] pharmacy, they will send through  department when patient picks it up. Phone call to Sofie to update her.     Mana Valdivia [facility]  Lung Transplant   Phone: [phone] Pager: [phone]

## 2022-11-04 ENCOUNTER — HOSPITAL ENCOUNTER (OUTPATIENT)
Dept: CARDIAC REHAB | Facility: CLINIC | Age: 62
Discharge: HOME | End: 2022-11-04
Attending: INTERNAL MEDICINE
Payer: MEDICARE

## 2022-11-04 ENCOUNTER — TELEPHONE (OUTPATIENT)
Dept: TRANSPLANT | Facility: CLINIC | Age: 62
End: 2022-11-04

## 2022-11-04 ENCOUNTER — LAB (OUTPATIENT)
Dept: LAB | Facility: CLINIC | Age: 62
End: 2022-11-04
Payer: MEDICARE

## 2022-11-04 DIAGNOSIS — Z94.2 S/P LUNG TRANSPLANT (H): Primary | ICD-10-CM

## 2022-11-04 DIAGNOSIS — Z94.2 S/P LUNG TRANSPLANT (H): ICD-10-CM

## 2022-11-04 LAB
ANION GAP SERPL CALCULATED.3IONS-SCNC: 12 MMOL/L (ref 7–15)
BUN SERPL-MCNC: 61.7 MG/DL (ref 8–23)
CALCIUM SERPL-MCNC: 9.9 MG/DL (ref 8.8–10.2)
CHLORIDE SERPL-SCNC: 92 MMOL/L (ref 98–107)
CREAT SERPL-MCNC: 2.9 MG/DL (ref 0.51–0.95)
DEPRECATED HCO3 PLAS-SCNC: 27 MMOL/L (ref 22–29)
ERYTHROCYTE [DISTWIDTH] IN BLOOD BY AUTOMATED COUNT: 15.7 % (ref 10–15)
GFR SERPL CREATININE-BSD FRML MDRD: 18 ML/MIN/1.73M2
GLUCOSE SERPL-MCNC: 164 MG/DL (ref 70–99)
HCT VFR BLD AUTO: 30 % (ref 35–47)
HGB BLD-MCNC: 9.7 G/DL (ref 11.7–15.7)
INR PPP: 0.89 (ref 0.85–1.15)
MCH RBC QN AUTO: 34.3 PG (ref 26.5–33)
MCHC RBC AUTO-ENTMCNC: 32.3 G/DL (ref 31.5–36.5)
MCV RBC AUTO: 106 FL (ref 78–100)
PLATELET # BLD AUTO: 252 10E3/UL (ref 150–450)
POTASSIUM SERPL-SCNC: 3.9 MMOL/L (ref 3.4–5.3)
RBC # BLD AUTO: 2.83 10E6/UL (ref 3.8–5.2)
SODIUM SERPL-SCNC: 131 MMOL/L (ref 136–145)
WBC # BLD AUTO: 8.3 10E3/UL (ref 4–11)

## 2022-11-04 PROCEDURE — 36415 COLL VENOUS BLD VENIPUNCTURE: CPT | Performed by: PATHOLOGY

## 2022-11-04 PROCEDURE — 85027 COMPLETE CBC AUTOMATED: CPT | Performed by: PATHOLOGY

## 2022-11-04 PROCEDURE — 85610 PROTHROMBIN TIME: CPT | Performed by: PATHOLOGY

## 2022-11-04 PROCEDURE — G0239 OTH RESP PROC, GROUP: HCPCS

## 2022-11-04 PROCEDURE — 86832 HLA CLASS I HIGH DEFIN QUAL: CPT | Performed by: INTERNAL MEDICINE

## 2022-11-04 PROCEDURE — 80048 BASIC METABOLIC PNL TOTAL CA: CPT | Performed by: PATHOLOGY

## 2022-11-04 PROCEDURE — 86833 HLA CLASS II HIGH DEFIN QUAL: CPT | Performed by: INTERNAL MEDICINE

## 2022-11-04 NOTE — TELEPHONE ENCOUNTER
DSA rising and with new DR53 at 846.     Per Kaylin Triana  -repeat labs today: PRA, C1Q assay, INR (will get full workup of labs for bronch next week)  -move bronch up to next week: BAL, biopsies, C4D staining   -Message to Dr. Lima to perform staining  -will message primary transplant doc with plan as well

## 2022-11-08 ENCOUNTER — LAB REQUISITION (OUTPATIENT)
Dept: LAB | Facility: CLINIC | Age: 62
End: 2022-11-08
Payer: MEDICARE

## 2022-11-08 ENCOUNTER — TELEPHONE (OUTPATIENT)
Dept: TRANSPLANT | Facility: CLINIC | Age: 62
End: 2022-11-08

## 2022-11-08 DIAGNOSIS — Z94.2 S/P LUNG TRANSPLANT (H): Primary | ICD-10-CM

## 2022-11-08 LAB
ACID FAST STAIN (ARUP): NORMAL

## 2022-11-08 RX ORDER — LIDOCAINE 40 MG/G
CREAM TOPICAL
Status: CANCELLED | OUTPATIENT
Start: 2022-11-08

## 2022-11-08 NOTE — TELEPHONE ENCOUNTER
Called patient to review bronch instructions for 11/9.     You are scheduled for a bronchoscopy on 11/9/22 at 11:00 AM at the [facility] on the 3rd floor. Arrive 1 hour early at 10:00 AM.    Instructions     1. Nothing to eat or drink 8 hours before procedure. Turn your tube feedings off between 2-3 AM.  2. Hold your morning medications except for metoprolol, take this in the morning. Take the rest of your medications once you get home.  3. Have a  available to take you home.   4. You will be holding your tube feedings overnight. Do not take any insulin in the evening when you check. Check your blood sugar in the morning before you leave for procedure.   5. Make sure you take a home covid test before you appointment tomorrow and bring in a picture of it.

## 2022-11-08 NOTE — PROGRESS NOTES
Bronchoscopy with lavage and biopsies   Date of transplant 6/28/22   Transplant type bilateral lung  Date of labs 11/4/22  BUN 61.7 DDAVP yes- ordered  Plt 252  INR 0.89 Anticoag therapy NA   COVID: pt will take home test and bring picture of negative result to appointment  Comment: Page Kaylin CAMARILLO with any questions/concerns [phone]

## 2022-11-09 ENCOUNTER — HOSPITAL ENCOUNTER (OUTPATIENT)
Facility: CLINIC | Age: 62
Discharge: HOME | End: 2022-11-09
Attending: INTERNAL MEDICINE | Admitting: INTERNAL MEDICINE
Payer: MEDICARE

## 2022-11-09 VITALS
DIASTOLIC BLOOD PRESSURE: 64 MMHG | RESPIRATION RATE: 16 BRPM | OXYGEN SATURATION: 96 % | SYSTOLIC BLOOD PRESSURE: 111 MMHG | HEART RATE: 90 BPM

## 2022-11-09 DIAGNOSIS — Z94.2 S/P LUNG TRANSPLANT (H): ICD-10-CM

## 2022-11-09 LAB
APPEARANCE FLD: CLEAR
BASOPHILS NFR FLD MANUAL: 1 %
BRONCHOSCOPY: NORMAL
C PNEUM DNA SPEC QL NAA+PROBE: NOT DETECTED
CELL COUNT BODY FLUID SOURCE: NORMAL
COLOR FLD: COLORLESS
EOSINOPHIL NFR FLD MANUAL: 3 %
FLUAV H1 2009 PAND RNA SPEC QL NAA+PROBE: NOT DETECTED
FLUAV H1 RNA SPEC QL NAA+PROBE: NOT DETECTED
FLUAV H3 RNA SPEC QL NAA+PROBE: NOT DETECTED
FLUAV RNA SPEC QL NAA+PROBE: NOT DETECTED
FLUBV RNA SPEC QL NAA+PROBE: NOT DETECTED
GLUCOSE BLDC GLUCOMTR-MCNC: 128 MG/DL (ref 70–99)
GRAM STAIN RESULT: NORMAL
GRAM STAIN RESULT: NORMAL
HADV DNA SPEC QL NAA+PROBE: NOT DETECTED
HCOV PNL SPEC NAA+PROBE: NOT DETECTED
HMPV RNA SPEC QL NAA+PROBE: NOT DETECTED
HPIV1 RNA SPEC QL NAA+PROBE: NOT DETECTED
HPIV2 RNA SPEC QL NAA+PROBE: NOT DETECTED
HPIV3 RNA SPEC QL NAA+PROBE: NOT DETECTED
HPIV4 RNA SPEC QL NAA+PROBE: NOT DETECTED
KOH PREPARATION: NORMAL
KOH PREPARATION: NORMAL
LYMPHOCYTES NFR FLD MANUAL: 17 %
M PNEUMO DNA SPEC QL NAA+PROBE: NOT DETECTED
MONOS+MACROS NFR FLD MANUAL: NORMAL %
NEUTS BAND NFR FLD MANUAL: 3 %
OTHER CELLS FLD MANUAL: 78 %
RSV RNA SPEC QL NAA+PROBE: NOT DETECTED
RSV RNA SPEC QL NAA+PROBE: NOT DETECTED
RV+EV RNA SPEC QL NAA+PROBE: NOT DETECTED
SA 1 CELL: NORMAL
SA 1 TEST METHOD: NORMAL
SA 2 CELL: NORMAL
SA 2 TEST METHOD: NORMAL
SA1 HI RISK ABY: NORMAL
SA1 MOD RISK ABY: NORMAL
SA2 HI RISK ABY: NORMAL
SA2 MOD RISK ABY: NORMAL
UNACCEPTABLE ANTIGENS: NORMAL
UNOS CPRA: 37
WBC # FLD AUTO: 89 /UL
ZZZSA 1  COMMENTS: NORMAL
ZZZSA 2 COMMENTS: NORMAL

## 2022-11-09 PROCEDURE — 31624 DX BRONCHOSCOPE/LAVAGE: CPT | Performed by: INTERNAL MEDICINE

## 2022-11-09 PROCEDURE — 999N000099 HC STATISTIC MODERATE SEDATION < 10 MIN: Performed by: INTERNAL MEDICINE

## 2022-11-09 PROCEDURE — 82962 GLUCOSE BLOOD TEST: CPT

## 2022-11-09 PROCEDURE — 87116 MYCOBACTERIA CULTURE: CPT | Performed by: INTERNAL MEDICINE

## 2022-11-09 PROCEDURE — 87205 SMEAR GRAM STAIN: CPT | Performed by: INTERNAL MEDICINE

## 2022-11-09 PROCEDURE — 250N000009 HC RX 250: Performed by: INTERNAL MEDICINE

## 2022-11-09 PROCEDURE — 87633 RESP VIRUS 12-25 TARGETS: CPT | Performed by: INTERNAL MEDICINE

## 2022-11-09 PROCEDURE — 87486 CHLMYD PNEUM DNA AMP PROBE: CPT | Performed by: INTERNAL MEDICINE

## 2022-11-09 PROCEDURE — 87305 ASPERGILLUS AG IA: CPT | Performed by: INTERNAL MEDICINE

## 2022-11-09 PROCEDURE — 89051 BODY FLUID CELL COUNT: CPT | Performed by: INTERNAL MEDICINE

## 2022-11-09 PROCEDURE — 87385 HISTOPLASMA CAPSUL AG IA: CPT | Performed by: INTERNAL MEDICINE

## 2022-11-09 PROCEDURE — 31624 DX BRONCHOSCOPE/LAVAGE: CPT | Mod: GC | Performed by: INTERNAL MEDICINE

## 2022-11-09 PROCEDURE — 88312 SPECIAL STAINS GROUP 1: CPT | Mod: TC,59 | Performed by: INTERNAL MEDICINE

## 2022-11-09 PROCEDURE — 87015 SPECIMEN INFECT AGNT CONCNTJ: CPT | Performed by: INTERNAL MEDICINE

## 2022-11-09 PROCEDURE — 87102 FUNGUS ISOLATION CULTURE: CPT | Performed by: INTERNAL MEDICINE

## 2022-11-09 PROCEDURE — 87210 SMEAR WET MOUNT SALINE/INK: CPT | Performed by: INTERNAL MEDICINE

## 2022-11-09 PROCEDURE — 87102 FUNGUS ISOLATION CULTURE: CPT | Mod: 59 | Performed by: INTERNAL MEDICINE

## 2022-11-09 PROCEDURE — 250N000011 HC RX IP 250 OP 636: Performed by: INTERNAL MEDICINE

## 2022-11-09 PROCEDURE — 87070 CULTURE OTHR SPECIMN AEROBIC: CPT | Performed by: INTERNAL MEDICINE

## 2022-11-09 RX ORDER — LIDOCAINE HYDROCHLORIDE 10 MG/ML
INJECTION, SOLUTION INFILTRATION; PERINEURAL PRN
Status: DISCONTINUED | OUTPATIENT
Start: 2022-11-09 | End: 2022-11-09 | Stop reason: HOSPADM

## 2022-11-09 RX ORDER — LIDOCAINE HYDROCHLORIDE 40 MG/ML
INJECTION, SOLUTION RETROBULBAR PRN
Status: DISCONTINUED | OUTPATIENT
Start: 2022-11-09 | End: 2022-11-09 | Stop reason: HOSPADM

## 2022-11-09 RX ORDER — FENTANYL CITRATE 50 UG/ML
INJECTION, SOLUTION INTRAMUSCULAR; INTRAVENOUS PRN
Status: DISCONTINUED | OUTPATIENT
Start: 2022-11-09 | End: 2022-11-09 | Stop reason: HOSPADM

## 2022-11-09 RX ORDER — LIDOCAINE 40 MG/G
CREAM TOPICAL
Status: DISCONTINUED | OUTPATIENT
Start: 2022-11-09 | End: 2022-11-09 | Stop reason: HOSPADM

## 2022-11-09 ASSESSMENT — ACTIVITIES OF DAILY LIVING (ADL): ADLS_ACUITY_SCORE: 35

## 2022-11-09 NOTE — OR NURSING
Pt had bronch with lavage under moderate sedation. Pt tolerated procedure very well. Pt transported to 3C recovery in stable condition on 2 lpm NC, procedural report given to Iqra RN for transport.

## 2022-11-09 NOTE — DISCHARGE INSTRUCTIONS
Discharge Instructions after Bronchoscopy    Activity  __x_ You had medicine to relax and for pain. You may feel dizzy or sleepy.  For 24 hours:   Do not drive or use heavy equipment.   Do not make important decisions.   Do not drink any alcohol.    Diet  __x_ When you can swallow easily, you may go back to your regular diet, medicines  and light exercise.    Follow-up  ___ We took small tissue or fluid samples to study. We will call you with the results in about 10 business days.    Call right away if you have:   Unusual chest pain   Temperature above 100.6  F (37.5  C)   Coughing that does not stop.    If you have severe pain, bleeding, or shortness of breath, go to an emergency room.    If you have questions, call:  Monday to Friday, 8 a.m. to 4:30 p.m.  Adult Pulmonology Clinic: [phone]    After hours:  [facility]: [phone] (Ask for the pulmonary fellow on call)

## 2022-11-10 DIAGNOSIS — Z94.2 S/P LUNG TRANSPLANT (H): Primary | ICD-10-CM

## 2022-11-10 LAB
CMV DNA SPEC NAA+PROBE-ACNC: NOT DETECTED IU/ML
CW10: 1600
DONOR IDENTIFICATION: NORMAL
DQB2: NORMAL
DR53: 1072
DSA COMMENTS: NORMAL
DSA PRESENT: YES
DSA TEST METHOD: NORMAL
METHYLMALONATE SERPL-SCNC: 0.5 UMOL/L (ref 0–0.4)
ORGAN: NORMAL
PATH REPORT.COMMENTS IMP SPEC: NORMAL
PATH REPORT.FINAL DX SPEC: NORMAL
PATH REPORT.GROSS SPEC: NORMAL
PATH REPORT.MICROSCOPIC SPEC OTHER STN: NORMAL
PATH REPORT.RELEVANT HX SPEC: NORMAL

## 2022-11-10 PROCEDURE — 88108 CYTOPATH CONCENTRATE TECH: CPT | Mod: 26 | Performed by: PATHOLOGY

## 2022-11-10 PROCEDURE — 88312 SPECIAL STAINS GROUP 1: CPT | Mod: 26 | Performed by: PATHOLOGY

## 2022-11-11 ENCOUNTER — LAB (OUTPATIENT)
Dept: LAB | Facility: CLINIC | Age: 62
End: 2022-11-11
Payer: MEDICARE

## 2022-11-11 ENCOUNTER — HOSPITAL ENCOUNTER (OUTPATIENT)
Dept: CARDIAC REHAB | Facility: CLINIC | Age: 62
Discharge: HOME | End: 2022-11-11
Attending: INTERNAL MEDICINE
Payer: MEDICARE

## 2022-11-11 ENCOUNTER — DOCUMENTATION ONLY (OUTPATIENT)
Dept: TRANSPLANT | Facility: CLINIC | Age: 62
End: 2022-11-11

## 2022-11-11 ENCOUNTER — ANCILLARY PROCEDURE (OUTPATIENT)
Dept: CT IMAGING | Facility: CLINIC | Age: 62
End: 2022-11-11
Attending: INTERNAL MEDICINE
Payer: MEDICARE

## 2022-11-11 DIAGNOSIS — T38.0X5A STEROID-INDUCED HYPERGLYCEMIA: ICD-10-CM

## 2022-11-11 DIAGNOSIS — J44.9 CHRONIC OBSTRUCTIVE PULMONARY DISEASE, UNSPECIFIED COPD TYPE (H): Primary | ICD-10-CM

## 2022-11-11 DIAGNOSIS — Z23 NEED FOR VACCINATION: ICD-10-CM

## 2022-11-11 DIAGNOSIS — R52 GENERALIZED PAIN: ICD-10-CM

## 2022-11-11 DIAGNOSIS — R73.9 STEROID-INDUCED HYPERGLYCEMIA: ICD-10-CM

## 2022-11-11 DIAGNOSIS — E53.8 B12 DEFICIENCY: ICD-10-CM

## 2022-11-11 DIAGNOSIS — Z94.2 LUNG REPLACED BY TRANSPLANT (H): ICD-10-CM

## 2022-11-11 DIAGNOSIS — Z94.2 S/P LUNG TRANSPLANT (H): ICD-10-CM

## 2022-11-11 LAB
ACID FAST STAIN (ARUP): NORMAL
ANION GAP SERPL CALCULATED.3IONS-SCNC: 12 MMOL/L (ref 7–15)
BACTERIA BRONCH: NORMAL
BUN SERPL-MCNC: 45.7 MG/DL (ref 8–23)
CALCIUM SERPL-MCNC: 9.8 MG/DL (ref 8.8–10.2)
CHLORIDE SERPL-SCNC: 92 MMOL/L (ref 98–107)
CMV DNA SPEC NAA+PROBE-ACNC: <137 IU/ML
CMV DNA SPEC NAA+PROBE-LOG#: <2.1 {LOG_COPIES}/ML
CREAT SERPL-MCNC: 3.1 MG/DL (ref 0.51–0.95)
DEPRECATED HCO3 PLAS-SCNC: 28 MMOL/L (ref 22–29)
ERYTHROCYTE [DISTWIDTH] IN BLOOD BY AUTOMATED COUNT: 14.8 % (ref 10–15)
GALACTOMANNAN AG BAL QL: NEGATIVE
GALACTOMANNAN AG SPEC IA-ACNC: 0.07
GFR SERPL CREATININE-BSD FRML MDRD: 17 ML/MIN/1.73M2
GLUCOSE SERPL-MCNC: 106 MG/DL (ref 70–99)
HCT VFR BLD AUTO: 30.9 % (ref 35–47)
HGB BLD-MCNC: 10.1 G/DL (ref 11.7–15.7)
IGG SERPL-MCNC: 844 MG/DL (ref 610–1616)
MAGNESIUM SERPL-MCNC: 2.1 MG/DL (ref 1.7–2.3)
MCH RBC QN AUTO: 34.8 PG (ref 26.5–33)
MCHC RBC AUTO-ENTMCNC: 32.7 G/DL (ref 31.5–36.5)
MCV RBC AUTO: 107 FL (ref 78–100)
PLATELET # BLD AUTO: 239 10E3/UL (ref 150–450)
POTASSIUM SERPL-SCNC: 4 MMOL/L (ref 3.4–5.3)
RBC # BLD AUTO: 2.9 10E6/UL (ref 3.8–5.2)
SCANNED LAB RESULT: NORMAL
SODIUM SERPL-SCNC: 132 MMOL/L (ref 136–145)
TACROLIMUS BLD-MCNC: 11.1 UG/L (ref 5–15)
TME LAST DOSE: NORMAL H
TME LAST DOSE: NORMAL H
WBC # BLD AUTO: 5.6 10E3/UL (ref 4–11)

## 2022-11-11 PROCEDURE — 71250 CT THORAX DX C-: CPT | Mod: MG | Performed by: RADIOLOGY

## 2022-11-11 PROCEDURE — 87799 DETECT AGENT NOS DNA QUANT: CPT | Performed by: INTERNAL MEDICINE

## 2022-11-11 PROCEDURE — 86833 HLA CLASS II HIGH DEFIN QUAL: CPT | Performed by: PHYSICIAN ASSISTANT

## 2022-11-11 PROCEDURE — 80048 BASIC METABOLIC PNL TOTAL CA: CPT | Performed by: PATHOLOGY

## 2022-11-11 PROCEDURE — 83036 HEMOGLOBIN GLYCOSYLATED A1C: CPT | Performed by: PHYSICIAN ASSISTANT

## 2022-11-11 PROCEDURE — 82784 ASSAY IGA/IGD/IGG/IGM EACH: CPT | Performed by: PHYSICIAN ASSISTANT

## 2022-11-11 PROCEDURE — 94375 RESPIRATORY FLOW VOLUME LOOP: CPT | Performed by: INTERNAL MEDICINE

## 2022-11-11 PROCEDURE — G0239 OTH RESP PROC, GROUP: HCPCS

## 2022-11-11 PROCEDURE — G1010 CDSM STANSON: HCPCS | Mod: GC | Performed by: RADIOLOGY

## 2022-11-11 PROCEDURE — 85027 COMPLETE CBC AUTOMATED: CPT | Performed by: PATHOLOGY

## 2022-11-11 PROCEDURE — 83735 ASSAY OF MAGNESIUM: CPT | Performed by: PATHOLOGY

## 2022-11-11 PROCEDURE — 80197 ASSAY OF TACROLIMUS: CPT | Performed by: INTERNAL MEDICINE

## 2022-11-11 PROCEDURE — 86832 HLA CLASS I HIGH DEFIN QUAL: CPT | Performed by: PHYSICIAN ASSISTANT

## 2022-11-11 PROCEDURE — 36415 COLL VENOUS BLD VENIPUNCTURE: CPT | Performed by: PATHOLOGY

## 2022-11-11 NOTE — PROGRESS NOTES
"Transplant Coordinator Note    Reason for visit: Post lung transplant follow up visit   Coordinator: Present (Girma- in person)  Caregiver: Son, Stefania, present     Health concerns addressed today:  1. ENT: At baseline  2. Respiratory: \"I feel pretty good\" No new SOB, get's winded with certain activity, but feels it's getting better. No cough. PFTs look improved.   3. GI: Continues with TF from 12 noon to 6AM. Venting g-tube consistently. Has occasional formed stool but for most part soft.  4. Mood: At baseline  5. DEXA due June 2023    Activity/rehab: Pulm rehab. Once a week. Started to work on the treadmill.  Oxygen needs: Only using oxygen at night, 1L.  Pain management/RX: tylenol and oxycodone as needed, incisional pain from time to time.   Diabetic management: on insulin- Novolog  Next Bronch due: Last bronch 11/9/22  Risk Criteria Labs: negative 7/28/22  CMV status: D+/R+  Valcyte stopped: POD 8-90  EBV status: D+/R+  AC/asa:  ASA discontinued after recent hospitalization   PJP prophylactic: Dapsone   Diet: Full liquid for pleasure. TF 12:00 noon-6AM (18 hours/day)  COVID:  1. COVID-19 infection (yes/no, date of most recent positive test):    2. Status/instructions given about COVID-19 vaccine: Vaccinated, booster x2 last 3/21/22. Received Evusheld 8/24/2022. Next due 2/24/2023. Received flu shot 10/12/22.   Pt Education: medications (use/dose/side effects), how/when to call coordinator, frequency of labs, s/s of infection/rejection, call prior to starting any new medications, lab/vital sign book    Health Maintenance:     Last colonoscopy:     Next colonoscopy due:     Dermatology:    Vaccinations this visit:     Labs, CXR, PFTs reviewed with patient  Medication record reviewed and reconciled  Questions and concerns addressed    Patient Instructions  1. Continue to hydrate with 60-70 oz fluids daily.  2. Continue to exercise daily or most days of the week.  3. Follow up with your primary care provider for " annual gender health maintenance procedures.  4. Follow up with colonoscopy schedule.  5. Follow up with annual dermatology visits.  6. It doesn't seem like the COVID vaccine is working well in lung transplant patients. A number of lung transplant patients have gotten sick with COVID even after receiving the vaccines.  Based on our recent experience, it can be life-threatening to get COVID  even after being vaccinated. Please continue to act like you did not get the COVID vaccine - social distancing, wearing a mask, good hand hygiene, etc. If the people around you are vaccinated, it will help reduce the risk of you getting COVID. All members of your household should be vaccinated.  7. Make sure you stick to full liquids only for now.   8. Girma will put in a referral for a sleep study to be done. Someone will call you to schedule this.       Next transplant clinic appointment: 4 weeks  with CXR, labs and PFTs  Next lab draw:  End of this week      AVS printed at time of check out

## 2022-11-11 NOTE — RESULT ENCOUNTER NOTE
Tacrolimus level 11.1 at 12.5 hours, on 11/11/22.  Goal 8-12.   Current dose 3.5 mg in AM, 3 mg in PM    No change in dose   PSafe message sent

## 2022-11-11 NOTE — PROGRESS NOTES
Dr. Franks reviewed chest CT and PFTs done.    -CT unchanged  -PFTs improving     No further intervention at this time  PRA in process from today (per immunology- running stat but lab still won't be resulted until Monday)    Pt will get repeat PRA drawn on Monday

## 2022-11-12 ENCOUNTER — TELEPHONE (OUTPATIENT)
Dept: TRANSPLANT | Facility: CLINIC | Age: 62
End: 2022-11-12

## 2022-11-12 NOTE — TELEPHONE ENCOUNTER
Transplant Social Work Services Phone Call      Data: Pt called SW to get assistance with setting up rides for dialysis as her current rides have ended. She uses [employer] [facility] [employer] ([phone]).  Intervention: Writer called the transportation provider to set up the next month's worth of rides but unfortunately they will only set up these rides for non-emergent transportation on weekdays. Writer called the patient to inform her of this and will also let Mana, her regular Lung Tx SW and her coordinator, Andrae know to follow up with her.  Assessment: Pt was agreeable and grateful for the communication.   Education provided by SW: Educated the pt on the plan to follow up on ride set up on Monday.   Plan:SW or Lung tx coordinator will set up the next Month's worth of rides for pt on Monday AM. Pt does Dialysis at [facility] on T,TH,S. She currently does NOT have a ride for this coming Tuesday. She also will NOT need a ride on Thanksgiving. She will skip that run and resume her normal run that following Saturday after the holiday.    Nicolle MONZON  Lung Tx SW  Covering for Mana MULTANI  [phone]

## 2022-11-13 NOTE — PROGRESS NOTES
[facility]  November 14, 2022         Assessment and Plan:   Sofie Rodriguez is a 62 y.o female with h/o BSLT on 6/28/22 for COPD complicated by persistent bilateral pleural effusions, hypercapnea, right hemidiaphragm palsy, C. diff colitis, gastroparesis s/p GJ tube placement, GI bleed 2/2 pyloric ulcer, hemobilia s/p ERCP and MRCP, and persistent vasoplegia. Other history notable for HFpEF, NTM colonoization, hepatitis C, and methamphetamine use. Recent history complicated by ESRD now s/p tunneled line placement and iHD initiation 9/26. She has an elevated PRA which we are watching closely. This is a routine follow up.     1. S/p bilateral sequential lung transplant:  Persistent bibasilar pleural effusions:   Right hemidiaphragm palsy: no new pulmonary complaints, progressing with her endurance in pulmonary rehab, has started the treadmill. Sating 95% on room air. Using 1L NC overnight. S/p bronch on 11/9 with no growth on cultures, no biopsies secondary to elevated BUN. CMV 11/11 negative. CT chest 11/11 with small effusions and unchanged MARLON opacity. PFTs with slight improvement to post transplant best, still below expected values.   - Will do overnight O2 study on RA, currently wearing 1L at night; consider sleep study for h/o hypercapnia  - On three drug IS including zathioprine 100 mg daily, tacrolimus (goal 8-12) and prednisone taper    Date AM dose (mg) PM dose (mg)   9/15/22 10 7.5   10/13/22 7.5 7.5   11/10/22 7.5 5   12/8/22 5 5   1/5/23 5 2.5      Prophylaxis:   - Dapsone qMWF for PJP ppx (Bactrim stopped d/t hyperkalemia; Pentamidine neb not tolerated)     2. Positive DSA: positive on 8/10 with DQB2 rising to 13 461 and DR53 up to 1072.  - DSA pending from 11/11 with plan to follow    3. EBV viremia: negative on 10/24.     4. Hypogammaglobulinemia: IgG adequate at time of transplant, repeat level low (336) on 7/28.  S/p IVIG 7/30. Last IgG of  844 on 11/11.      5. ESRD based upon Cystatin C (GFR of 10): s/p tunneled line placement and HD initiated 9/26 on T/Th/Sat schedule.     6. Diarrhea: C diff negative, transitioned from MMF to AZA as above, also complicated by ongoing tube feeds. Using imodium 2 times/day, improved with soft stools.   - Continue fiber and imodium PRN    7. Severe gastroparesis:   Pyloric ulcer: gastric emptying study 7/20 with severe gastric emptying delay, s/p GJ tube placement in IR 7/27.  S/p EGD 8/11 with mild erythema 2/2 GJ tube trauma seen near insertion site but no active bleeding. Repeat gastric emptying study 9/30 unfortunately with persistent severe retention (91% at 4h); defer adjustments to current plan and consider semi-permanent status. S/p ERCP 10/7 with removal of stents and sludge; PEG/J tube replaced at that time.   - PPI BID  - TF cycled 18 hours; and ALL enteral medications via J tube  - G tube to gravity drainage overnight and prn; full liquid diet for comfort only  - Gastric emptying study in January with Dr. Navarro follow up    Of note, patient is testing her BS QID and is working with our Pharm D on blood sugar management. She will need ongoing supplies.     RTC: TBD pending labs  Vaccinations: flu shot completed; Evusheld > February 2023; got the bivalent covid vaccine  Annual dermatology visit: discussed today  Preventative: DEXA > June 2023; will need to review colonoscopy, mammogram    Kaylin Triana PA-C  Pulmonary, Allergy, Critical Care and Sleep Medicine        Interval History:     Feeling pretty good, going to pulmonary rehab on Friday. Started the treadmill, no new or unexpected shortness of breath, does have shortness of breath with activity, but doesn't have to stop and catch her breath as she did before. No fever or chills, no nasal or sinus congestion, minimal cough. Pain is controlled, no other chest pain or palpitations. Continues with cycled TF noon-6 am. Still doing drainage from her G port  nearly all the time. No bloating or gas, stools are more formed that prior, but loose.          Review of Systems:   Please see HPI, otherwise the complete 10 point ROS is negative.           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     Hepatitis 2017    Hep C, [facility]     HTN (hypertension)      Osteopenia      Past Surgical History:   Procedure Laterality Date     BRONCHOSCOPY (RIGID OR FLEXIBLE), DIAGNOSTIC N/A 8/2/2022    Procedure: BRONCHOSCOPY, DIAGNOSTIC- inspection Bronch;  Surgeon: Kamala Lovell MD;  Location:  GI     BRONCHOSCOPY (RIGID OR FLEXIBLE), DIAGNOSTIC N/A 9/13/2022    Procedure: INSPECTION BRONCHOSCOPY, WITH BRONCHOALVEOLAR LAVAGE;  Surgeon: Jose R Mccullough MD;  Location:  GI     BRONCHOSCOPY (RIGID OR FLEXIBLE), DIAGNOSTIC N/A 11/9/2022    Procedure: BRONCHOSCOPY, WITH BRONCHOALVEOLAR LAVAGE AND BIOPSY;  Surgeon: Cesar Lima MD;  Location:  GI     BRONCHOSCOPY FLEXIBLE AND RIGID N/A 07/19/2022    Procedure: BRONCHOSCOPY inspection only;  Surgeon: Bob Liao MD;  Location:  GI     COLONOSCOPY  2015     CV CORONARY ANGIOGRAM N/A 06/30/2021    Procedure: CV CORONARY ANGIOGRAM;  Surgeon: Alexander Cuellar MD;  Location:  HEART CARDIAC CATH LAB     CV RIGHT HEART CATH MEASUREMENTS RECORDED N/A 06/30/2021    Procedure: CV RIGHT HEART CATH;  Surgeon: Alexander Cuellar MD;  Location:  HEART CARDIAC CATH LAB     ENDOSCOPIC RETROGRADE CHOLANGIOPANCREATOGRAM N/A 8/11/2022    Procedure: ENDOSCOPIC RETROGRADE CHOLANGIOPANCREATOGRAPHY WITH PANCREATIC DUCT NEEDLE KNIFE AND STENT PLACEMENT, BILE DUCT SPHINCTEROTOMY, BLOOD/DEBRIS REMOVAL AND STENT PLACEMENT;  Surgeon: Cosmo Arroyo MD;  Location:  OR     ENDOSCOPIC RETROGRADE CHOLANGIOPANCREATOGRAM N/A  10/7/2022    Procedure: ENDOSCOPIC RETROGRADE CHOLANGIOPANCREATOGRAPHY with biliary and pancreatic stent removal, debris removal;  Surgeon: Cosmo Arroyo MD;  Location: [facility] OR     ENT SURGERY  1974    tonsillectomy     ENTEROSCOPY SMALL BOWEL N/A 8/11/2022    Procedure: SMALL BOWEL ENTEROSCOPY;  Surgeon: Cosmo Arroyo MD;  Location: [facility] OR     ESOPHAGOGASTRODUODENOSCOPY, WITH NASOGASTRIC TUBE INSERTION N/A 07/01/2022    Procedure: ESOPHAGOGASTRODUODENOSCOPY, WITH NASOJEJUNAL TUBE INSERTION;  Surgeon: Ozzy Nickerson MD;  Location:  GI     ESOPHAGOSCOPY, GASTROSCOPY, DUODENOSCOPY (EGD), COMBINED N/A 8/3/2022    Procedure: ESOPHAGOGASTRODUODENOSCOPY (EGD);  Surgeon: Ira Andres MD;  Location:  GI     HAND SURGERY       INSERT CHEST TUBE Right 9/13/2022    Procedure: Insert chest tube;  Surgeon: Jose R Mccullough MD;  Location:  GI     IR CVC TUNNEL PLACEMENT > 5 YRS OF AGE  9/26/2022     IR GASTRO JEJUNOSTOMY TUBE CHANGE  8/31/2022     IR GASTRO JEJUNOSTOMY TUBE PLACEMENT  7/27/2022     IR THORACENTESIS  8/29/2022     LEEP TX, CERVICAL  04/07/2017    HECTOR III     LYMPH NODE BIOPSY Left 2005    Left axilla, benign- [place]     MIDLINE INSERTION - DOUBLE LUMEN Left 07/28/2022    20cm, Basilic vein     REPLACE GASTROJEJUNOSTOMY TUBE, PERCUTANEOUS  10/7/2022    Procedure: Replace Gastrojejunostomy Tube;  Surgeon: Cosmo Arroyo MD;  Location: [facility] OR     THORACENTESIS Left 8/29/2022    Procedure: THORACENTESIS;  Surgeon: Bo Capone PA-C;  Location: [facility] [place]     THORACENTESIS Left 9/13/2022    Procedure: Thoracentesis;  Surgeon: Jose R Mccullough MD;  Location: [facility] GI     THROMBECTOMY UPPER EXTREMITY Left 07/02/2022    Procedure: LEFT RADIAL ARM THROMBECTOMY;  Surgeon: Christie Graham MD;  Location: [facility] OR     TRANSPLANT LUNG RECIPIENT SINGLE X2 Bilateral 06/28/2022    Procedure: Clamshell Incision, Bilateral Sequential Lung Transplant, On Cardiopulmonary  Bypass, Flexible Bronchoscopy;  Surgeon: Sue Sunshine MD;  Location:  OR           Family History:     No family history on file.         Social History:     Social History     Socioeconomic History     Marital status: Single     Spouse name: Not on file     Number of children: Not on file     Years of education: Not on file     Highest education level: Not on file   Occupational History     Not on file   Tobacco Use     Smoking status: Former     Years: 30.00     Types: Cigarettes     Quit date: 2020     Years since quittin.0     Smokeless tobacco: Never   Substance and Sexual Activity     Alcohol use: Not Currently     Drug use: Not Currently     Types: Marijuana, Methamphetamines     Comment: hx:marijuana and methamphetamine-quit both unsure ?  2-3 yrs ago     Sexual activity: Not on file   Other Topics Concern     Parent/sibling w/ CABG, MI or angioplasty before 65F 55M? Not Asked   Social History Narrative     Not on file     Social Determinants of Health     Financial Resource Strain: Not on file   Food Insecurity: Not on file   Transportation Needs: Not on file   Physical Activity: Not on file   Stress: Not on file   Social Connections: Not on file   Intimate Partner Violence: Not on file   Housing Stability: Not on file            Medications:     Current Outpatient Medications   Medication     alcohol swab prep pads     azaTHIOprine (IMURAN) 5 mg/mL SUSP     B and C vitamin Complex with folic acid (NEPHRONEX) 0.9 MG/5ML LIQD liquid     blood glucose (CONTOUR NEXT TEST) test strip     blood glucose (NO BRAND SPECIFIED) lancets standard     blood glucose monitoring (CONTOUR NEXT MONITOR W/DEVICE KIT) meter device kit     calcium carbonate 600 mg-vitamin D 400 units (CALTRATE) 600-400 MG-UNIT per tablet     cyanocobalamin (CYANOCOBALAMIN) 500 MCG tablet     dapsone 2 mg/mL SUSP     folic acid (FOLVITE) 1 MG tablet     Guar Gum (FIBER MODULAR, NUTRISOURCE FIBER,) packet     insulin aspart  (NOVOLOG PEN) 100 UNIT/ML pen     insulin pen needle (31G X 5 MM) 31G X 5 MM miscellaneous     insulin pen needle (32G X 4 MM) 32G X 4 MM miscellaneous     loperamide (IMODIUM A-D) 2 MG tablet     metoprolol tartrate (LOPRESSOR) 50 MG tablet     Nutritional Supplements (GLUCOSE MANAGEMENT) TABS     nystatin (MYCOSTATIN) 157895 UNIT/ML suspension     ondansetron (ZOFRAN ODT) 4 MG ODT tab     pantoprazole (PROTONIX) 2 mg/mL SUSP suspension     predniSONE (DELTASONE) 5 MG tablet     protein modular (PROSOURCE TF) LIQD     Sharps Container MISC     tacrolimus (GENERIC EQUIVALENT) 1 mg/mL suspension     No current facility-administered medications for this visit.            Physical Exam:   /65   Pulse 95   SpO2 95%     GENERAL: alert, NAD  HEENT: NCAT, EOMI, no scleral icterus, oral mucosa moist and without lesions  Neck: no cervical or supraclavicular adenopathy  Lungs: moderate airflow; few scattered crackles  CV: RRR, S1S2, + murmur  Abdomen: normoactive BS, soft, non tender; GJ tube in place  Lymph: no edema  Neuro: AAO X 3, CN 2-12 grossly intact  Psychiatric: normal affect, good eye contact  Skin: no rash, jaundice or lesions on limited exam         Data:   All laboratory and imaging data reviewed.      Recent Results (from the past 168 hour(s))   Glucose by meter    Collection Time: 11/09/22 10:46 AM   Result Value Ref Range    GLUCOSE BY METER POCT 128 (H) 70 - 99 mg/dL   BRONCHOSCOPY    Collection Time: 11/09/22 10:50 AM   Result Value Ref Range    Bronchoscopy       [facility]  Endoscopy Department-[facility]  _______________________________________________________________________________  Patient Name: Sofie Rodriguez            Procedure Date: 11/9/2022 10:50 AM  MRN: [id number]                       Account #: [id number]  YOB: 1960               Admit Type: Outpatient  Age: 62                               Gender: Female  Note Status: Finalized                 Attending MD: Cesar Lima MD  Pause for the cause: Done             Total Sedation Time: 9 minutes  _______________________________________________________________________________     Procedure:             Bronchoscopy  Indications:           hx of lung transplant, gradually declining lung                          volumes, rising antibody titers  Providers:             Cesar Lima MD, NADIR BONNER MD (Fellow),                          Julienne Olivo RN, Amanda Galo, Technician  Medicines:             4% lidocaine 9cc, 1% lid ocaine 12cc  Requesting Physician:    Complications:         None  _______________________________________________________________________________  Procedure:             Pre-Anesthesia Assessment:                         - A History and Physical has been performed. Patient                          meds and allergies have been reviewed. The risks and                          benefits of the procedure and the sedation options and                          risks were discussed with the patient. All questions                          were answered and informed consent was obtained.                          Patient identification and proposed procedure were                          verified prior to the procedure by the physician in                          the pre-procedure area. Mental Status Examination:                          normal. ASA Grade Assessment: II - A patient with mild                          systemic disease. After reviewing the risks and                          benefits , the patient was deemed in satisfactory                          condition to undergo the procedure. The anesthesia                          plan was to use minimal sedation / analgesia                          (anxiolysis). Immediately prior to administration of                          medications, the patient was re-assessed for adequacy                          to receive sedatives.  The heart rate, respiratory                          rate, oxygen saturations, blood pressure, adequacy of                          pulmonary ventilation, and response to care were                          monitored throughout the procedure. The physical                          status of the patient was re-assessed after the                          procedure.                         After obtaining informed consent, the Bronchoscope was                          introduced through the mouth, via face mask and                          advanced to the tracheobronchial tree. The procedure                           was accomplished without difficulty. The patient                          tolerated the procedure well. The total duration of                          the procedure was 9 minutes.  Findings:       Vocal cords normal. Trachea normal in appearance with slight rightward        deviation. Elsy normal. R anastomosis site intact with no evidence of        bleeding or dehiscence. L anastomosis site intact with no evidence of        bleeding or dehiscence. R and L tracheobronchial tree normal in        appearance with minimal amounts of thin clear secretions suctioned.        Bronchoscope placed into wedge position within the superior segment of        the lingular bronchus, 120cc of NS lavage in, 40cc of sample retrieved.  Impression:            - Normal exam                         - L and R anastomosis sites intact                         - 40cc of BAL sent for lab analysis from superior                          segment of lingular bronchus  Moderate Sedation:       75mcg fentan yl, 1.5mg versed  Recommendation:        - Await test results.  Attending Participation:       I was present and participated during the entire procedure, including        non-key portions.    Signed electronically by Cesar Lima MD  _______________  Cesar Lima MD  11/9/2022 11:36:44 AM  I was physically present for the entire viewing  portion of the exam.  __________________________  Signature of teaching physician  Shelby/Alphonse Lima MD  Number of Addenda: 0    Note Initiated On: 11/9/2022 10:50 AM     Gram Stain    Collection Time: 11/09/22 11:25 AM    Specimen: Bronchus, Left; Bronchial Alveolar Lavage   Result Value Ref Range    Gram Stain Result >25 PMNs/low power field     Gram Stain Result 1+ Mixed ojsue    KOH Preparation    Collection Time: 11/09/22 11:25 AM    Specimen: Bronchus, Left; Bronchial Alveolar Lavage   Result Value Ref Range    KOH Preparation No fungal elements seen     KOH Preparation Reference Range: No fungal elements seen.    Nocardia species culture    Collection Time: 11/09/22 11:25 AM    Specimen: Bronchus, Left; Bronchial Alveolar Lavage   Result Value Ref Range    Culture No growth after 4 days    Fungal or Yeast Culture Routine    Collection Time: 11/09/22 11:25 AM    Specimen: Bronchus, Left; Bronchial Alveolar Lavage   Result Value Ref Range    Culture No growth after 4 days    Histoplasma Capsulatum Agn Non Blood    Collection Time: 11/09/22 11:25 AM   Result Value Ref Range    See Scanned Result HISTOPLASMA CAPSULATUM AGN NON BLOOD-Scanned    Aspergillus Galactomannan Agn Bronchial    Collection Time: 11/09/22 11:25 AM   Result Value Ref Range    Aspergillus Galactomannan Index 0.07     Aspergillus Galactomannan Antigen BAL Negative Negative   Respiratory Aerobic Bacterial Culture    Collection Time: 11/09/22 11:25 AM    Specimen: Bronchus, Left; Bronchial Alveolar Lavage   Result Value Ref Range    Culture 1+ Normal josue    Acid-Fast Bacilli Culture and Stain    Collection Time: 11/09/22 11:25 AM    Specimen: Bronchus, Left; Bronchial Alveolar Lavage   Result Value Ref Range    Acid Fast Stain No acid fast bacilli seen    CMV Quantitative, PCR    Collection Time: 11/09/22 11:32 AM    Specimen: Bronchus, Left; Bronchial Alveolar Lavage   Result Value Ref Range    CMV DNA IU/mL Not Detected Not Detected IU/mL    Cytology, non-gynecologic    Collection Time: 11/09/22 11:32 AM   Result Value Ref Range    Final Diagnosis       Specimen A     Interpretation:      Negative for malignancy     Other Findings:      No fungal or Pneumocystis organisms are identified on GMS stained preparations.    Viral cytopathic effect is absent.     Adequacy:     Satisfactory for evaluation          Clinical Information       The patient is a 62 year old female s/p BSLT on 6/28/22 for COPD complicated by persistent bilateral pleural effusions.      Gross Description       A(1). Bronchus, Left, lingula:A. Bronchus, Left, lingula, Bronchial Alveolar Lavage with GMS:  Received 15 ml of cloudy, white fluid, concentrated, resuspended and processed as 2 Pap stained direct smears and 2 GMS stained direct smears.                 Microscopic Description       Microscopic examination was performed.    A resident/fellow in an ACGME accredited training program was involved in the initial review, preparation, and/or interpretation of this case.  I, as the senior physician, attest that I: (i) reviewed patient clinical records if indicated; (ii) reviewed relevant lab test results; (iii) examined the relevant preparation(s) for the specimen(s); and (iv) agree with the report, diagnosis(es), and interpretation as documented by the resident/fellow and edited/confirmed by me. Reporting resident/fellow: Shilpa Pimentel, PGY5        Performing Labs       The technical component of this testing was completed at [facility]     Respiratory Panel PCR    Collection Time: 11/09/22 11:32 AM    Specimen: Bronchus, Left; Bronchial Alveolar Lavage   Result Value Ref Range    Adenovirus Not Detected Not Detected    Coronavirus Not Detected Not Detected    Human Metapneumovirus Not Detected Not Detected    Human Rhin/Enterovirus Not Detected Not Detected    Influenza A Not Detected Not Detected    Influenza  A, H1 Not Detected Not Detected    Influenza A 2009 H1N1 Not Detected Not Detected    Influenza A, H3 Not Detected Not Detected    Influenza B Not Detected Not Detected    Parainfluenza Virus 1 Not Detected Not Detected    Parainfluenza Virus 2 Not Detected Not Detected    Parainfluenza Virus 3 Not Detected Not Detected    Parainfluenza Virus 4 Not Detected Not Detected    Respiratory Syncytial Virus A Not Detected Not Detected    Respiratory Syncytial Virus B Not Detected Not Detected    Chlamydia Pneumoniae Not Detected Not Detected    Mycoplasma Pneumoniae Not Detected Not Detected   Cell Count Body Fluid    Collection Time: 11/09/22 11:33 AM   Result Value Ref Range    Color Colorless Colorless, Yellow    Clarity Clear Clear    Total Nucleated Cells 89 /uL    Cell Count Fluid Source Bronchus, Left    Differential Body Fluid    Collection Time: 11/09/22 11:33 AM   Result Value Ref Range    % Neutrophils 3 %    % Lymphocytes 17 %    % Monocyte/Macrophages      % Eosinophils 3 %    % Basophils 1 %    % Other Cells 78 %   General PFT Lab (Please always keep checked)    Collection Time: 11/11/22  7:53 AM   Result Value Ref Range    FVC-Pred 3.06 L    FVC-Pre 1.41 L    FVC-%Pred-Pre 46 %    FEV1-Pre 1.35 L    FEV1-%Pred-Pre 55 %    FEV1FVC-Pred 79 %    FEV1FVC-Pre 96 %    FEFMax-Pred 6.14 L/sec    FEFMax-Pre 3.94 L/sec    FEFMax-%Pred-Pre 64 %    FEF2575-Pred 2.22 L/sec    FEF2575-Pre 3.06 L/sec    SPC7573-%Pred-Pre 137 %    ExpTime-Pre 6.35 sec    FIFMax-Pre 3.69 L/sec    FEV1FEV6-Pred 81 %    FEV1FEV6-Pre 93 %   IgG    Collection Time: 11/11/22  8:29 AM   Result Value Ref Range    Immunoglobulin G 844 610 - 1,616 mg/dL   CBC with platelets    Collection Time: 11/11/22  8:29 AM   Result Value Ref Range    WBC Count 5.6 4.0 - 11.0 10e3/uL    RBC Count 2.90 (L) 3.80 - 5.20 10e6/uL    Hemoglobin 10.1 (L) 11.7 - 15.7 g/dL    Hematocrit 30.9 (L) 35.0 - 47.0 %     (H) 78 - 100 fL    MCH 34.8 (H) 26.5 - 33.0 pg     MCHC 32.7 31.5 - 36.5 g/dL    RDW 14.8 10.0 - 15.0 %    Platelet Count 239 150 - 450 10e3/uL   Magnesium    Collection Time: 11/11/22  8:29 AM   Result Value Ref Range    Magnesium 2.1 1.7 - 2.3 mg/dL   Basic metabolic panel    Collection Time: 11/11/22  8:29 AM   Result Value Ref Range    Sodium 132 (L) 136 - 145 mmol/L    Potassium 4.0 3.4 - 5.3 mmol/L    Chloride 92 (L) 98 - 107 mmol/L    Carbon Dioxide (CO2) 28 22 - 29 mmol/L    Anion Gap 12 7 - 15 mmol/L    Urea Nitrogen 45.7 (H) 8.0 - 23.0 mg/dL    Creatinine 3.10 (H) 0.51 - 0.95 mg/dL    Calcium 9.8 8.8 - 10.2 mg/dL    Glucose 106 (H) 70 - 99 mg/dL    GFR Estimate 17 (L) >60 mL/min/1.73m2   CMV Quantitative, PCR    Collection Time: 11/11/22  8:29 AM    Specimen: Hand, Left; Blood   Result Value Ref Range    CMV DNA IU/mL <137 (A) Not Detected IU/mL    CMV log <2.1    Tacrolimus by Tandem Mass Spectrometry    Collection Time: 11/11/22  8:29 AM   Result Value Ref Range    Tacrolimus by Tandem Mass Spectrometry 11.1 5.0 - 15.0 ug/L    Tacrolimus Last Dose Date 11/10/2022     Tacrolimus Last Dose Time  8:00 PM      PFT interpretation:  Maneuver: valid and met ATS guidelines  Increased ratio with decreased FEV1 and FVC  Compare to prior: FEV1 of 1.35 is 50 ml above prior  Decrease in FVC may be related to restriction; lung volumes would be necessary to determine

## 2022-11-14 ENCOUNTER — TELEPHONE (OUTPATIENT)
Dept: TRANSPLANT | Facility: CLINIC | Age: 62
End: 2022-11-14

## 2022-11-14 ENCOUNTER — OFFICE VISIT (OUTPATIENT)
Dept: PULMONOLOGY | Facility: CLINIC | Age: 62
End: 2022-11-14
Attending: FAMILY MEDICINE
Payer: MEDICARE

## 2022-11-14 VITALS — HEART RATE: 95 BPM | DIASTOLIC BLOOD PRESSURE: 65 MMHG | SYSTOLIC BLOOD PRESSURE: 111 MMHG | OXYGEN SATURATION: 95 %

## 2022-11-14 DIAGNOSIS — Z94.2 S/P LUNG TRANSPLANT (H): ICD-10-CM

## 2022-11-14 DIAGNOSIS — T38.0X5A STEROID-INDUCED HYPERGLYCEMIA: Primary | ICD-10-CM

## 2022-11-14 DIAGNOSIS — R73.9 STEROID-INDUCED HYPERGLYCEMIA: Primary | ICD-10-CM

## 2022-11-14 LAB
CW10: 1420
DONOR IDENTIFICATION: NORMAL
DQB2: NORMAL
DR53: 781
DSA COMMENTS: NORMAL
DSA PRESENT: YES
DSA TEST METHOD: NORMAL
EBV DNA COPIES/ML, INSTRUMENT: 6423 COPIES/ML
EBV DNA SPEC NAA+PROBE-LOG#: 3.8 {LOG_COPIES}/ML
EXPTIME-PRE: 6.35 SEC
FEF2575-%PRED-PRE: 137 %
FEF2575-PRE: 3.06 L/SEC
FEF2575-PRED: 2.22 L/SEC
FEFMAX-%PRED-PRE: 64 %
FEFMAX-PRE: 3.94 L/SEC
FEFMAX-PRED: 6.14 L/SEC
FEV1-%PRED-PRE: 55 %
FEV1-PRE: 1.35 L
FEV1FEV6-PRE: 93 %
FEV1FEV6-PRED: 81 %
FEV1FVC-PRE: 96 %
FEV1FVC-PRED: 79 %
FIFMAX-PRE: 3.69 L/SEC
FVC-%PRED-PRE: 46 %
FVC-PRE: 1.41 L
FVC-PRED: 3.06 L
HBA1C MFR BLD: 5.2 %
ORGAN: NORMAL
SA 1 CELL: NORMAL
SA 1 TEST METHOD: NORMAL
SA 2 CELL: NORMAL
SA 2 TEST METHOD: NORMAL
SA1 HI RISK ABY: NORMAL
SA1 MOD RISK ABY: NORMAL
SA2 HI RISK ABY: NORMAL
SA2 MOD RISK ABY: NORMAL
UNACCEPTABLE ANTIGENS: NORMAL
UNOS CPRA: 37
ZZZSA 1  COMMENTS: NORMAL
ZZZSA 2 COMMENTS: NORMAL

## 2022-11-14 PROCEDURE — 99214 OFFICE O/P EST MOD 30 MIN: CPT | Mod: 25 | Performed by: PHYSICIAN ASSISTANT

## 2022-11-14 PROCEDURE — G0463 HOSPITAL OUTPT CLINIC VISIT: HCPCS | Mod: 25

## 2022-11-14 RX ORDER — AMINO AC/PROTEIN HYDR/WHEY PRO 10G-100/30
1 LIQUID (ML) ORAL DAILY
Qty: 30 PACKET | Refills: 11 | Status: ON HOLD | OUTPATIENT
Start: 2022-11-14 | End: 2024-04-09

## 2022-11-14 NOTE — NURSING NOTE
Chief Complaint   Patient presents with     Transplant     Ltx f/u     Vitals were taken and medications were reconciled.     YG Rothman

## 2022-11-14 NOTE — LETTER
"    11/14/2022         RE: Sofie Rodriguez  [address] [place] [address]        Dear Colleague,    Thank you for referring your patient, Sofie Rodriguez, to the [facility] [place] [facility] [place]. Please see a copy of my visit note below.    Transplant Coordinator Note    Reason for visit: Post lung transplant follow up visit   Coordinator: Present (Girma- in person)  Caregiver: Son, Stefania, present     Health concerns addressed today:  1. ENT: At baseline  2. Respiratory: \"I feel pretty good\" No new SOB, get's winded with certain activity, but feels it's getting better. No cough. PFTs look improved.   3. GI: Continues with TF from 12 noon to 6AM. Venting g-tube consistently. Has occasional formed stool but for most part soft.  4. Mood: At baseline  5. DEXA due June 2023    Activity/rehab: Pulm rehab. Once a week. Started to work on the treadmill.  Oxygen needs: Only using oxygen at night, 1L.  Pain management/RX: tylenol and oxycodone as needed, incisional pain from time to time.   Diabetic management: on insulin- Novolog  Next Bronch due: Last bronch 11/9/22  Risk Criteria Labs: negative 7/28/22  CMV status: D+/R+  Valcyte stopped: POD 8-90  EBV status: D+/R+  AC/asa:  ASA discontinued after recent hospitalization   PJP prophylactic: Dapsone   Diet: Full liquid for pleasure. TF 12:00 noon-6AM (18 hours/day)  COVID:  1. COVID-19 infection (yes/no, date of most recent positive test):    2. Status/instructions given about COVID-19 vaccine: Vaccinated, booster x2 last 3/21/22. Received Evusheld 8/24/2022. Next due 2/24/2023. Received flu shot 10/12/22.   Pt Education: medications (use/dose/side effects), how/when to call coordinator, frequency of labs, s/s of infection/rejection, call prior to starting any new medications, lab/vital sign book    Health Maintenance:     Last colonoscopy:     Next colonoscopy due:     Dermatology:    Vaccinations this visit:     Labs, CXR, " PFTs reviewed with patient  Medication record reviewed and reconciled  Questions and concerns addressed    Patient Instructions  1. Continue to hydrate with 60-70 oz fluids daily.  2. Continue to exercise daily or most days of the week.  3. Follow up with your primary care provider for annual gender health maintenance procedures.  4. Follow up with colonoscopy schedule.  5. Follow up with annual dermatology visits.  6. It doesn't seem like the COVID vaccine is working well in lung transplant patients. A number of lung transplant patients have gotten sick with COVID even after receiving the vaccines.  Based on our recent experience, it can be life-threatening to get COVID  even after being vaccinated. Please continue to act like you did not get the COVID vaccine - social distancing, wearing a mask, good hand hygiene, etc. If the people around you are vaccinated, it will help reduce the risk of you getting COVID. All members of your household should be vaccinated.  7. Make sure you stick to full liquids only for now.   8. Girma will put in a referral for a sleep study to be done. Someone will call you to schedule this.       Next transplant clinic appointment: 4 weeks  with CXR, labs and PFTs  Next lab draw:  End of this week      AVS printed at time of check out          [facility]  November 14, 2022         Assessment and Plan:   Sofie Rodriguez is a 62 y.o female with h/o BSLT on 6/28/22 for COPD complicated by persistent bilateral pleural effusions, hypercapnea, right hemidiaphragm palsy, C. diff colitis, gastroparesis s/p GJ tube placement, GI bleed 2/2 pyloric ulcer, hemobilia s/p ERCP and MRCP, and persistent vasoplegia. Other history notable for HFpEF, NTM colonoization, hepatitis C, and methamphetamine use. Recent history complicated by ESRD now s/p tunneled line placement and iHD initiation 9/26. She has an elevated PRA which we are watching closely.  This is a routine follow up.     1. S/p bilateral sequential lung transplant:  Persistent bibasilar pleural effusions:   Right hemidiaphragm palsy: no new pulmonary complaints, progressing with her endurance in pulmonary rehab, has started the treadmill. Sating 95% on room air. Using 1L NC overnight. S/p bronch on 11/9 with no growth on cultures, no biopsies secondary to elevated BUN. CMV 11/11 negative. CT chest 11/11 with small effusions and unchanged MARLON opacity. PFTs with slight improvement to post transplant best, still below expected values.   - Will do overnight O2 study on RA, currently wearing 1L at night; consider sleep study for h/o hypercapnia  - On three drug IS including zathioprine 100 mg daily, tacrolimus (goal 8-12) and prednisone taper    Date AM dose (mg) PM dose (mg)   9/15/22 10 7.5   10/13/22 7.5 7.5   11/10/22 7.5 5   12/8/22 5 5   1/5/23 5 2.5      Prophylaxis:   - Dapsone qMWF for PJP ppx (Bactrim stopped d/t hyperkalemia; Pentamidine neb not tolerated)     2. Positive DSA: positive on 8/10 with DQB2 rising to 13 461 and DR53 up to 1072.  - DSA pending from 11/11 with plan to follow    3. EBV viremia: negative on 10/24.     4. Hypogammaglobulinemia: IgG adequate at time of transplant, repeat level low (336) on 7/28.  S/p IVIG 7/30. Last IgG of 844 on 11/11.      5. ESRD based upon Cystatin C (GFR of 10): s/p tunneled line placement and HD initiated 9/26 on T/Th/Sat schedule.     6. Diarrhea: C diff negative, transitioned from MMF to AZA as above, also complicated by ongoing tube feeds. Using imodium 2 times/day, improved with soft stools.   - Continue fiber and imodium PRN    7. Severe gastroparesis:   Pyloric ulcer: gastric emptying study 7/20 with severe gastric emptying delay, s/p GJ tube placement in IR 7/27.  S/p EGD 8/11 with mild erythema 2/2 GJ tube trauma seen near insertion site but no active bleeding. Repeat gastric emptying study 9/30 unfortunately with persistent severe  retention (91% at 4h); defer adjustments to current plan and consider semi-permanent status. S/p ERCP 10/7 with removal of stents and sludge; PEG/J tube replaced at that time.   - PPI BID  - TF cycled 18 hours; and ALL enteral medications via J tube  - G tube to gravity drainage overnight and prn; full liquid diet for comfort only  - Gastric emptying study in January with Dr. Navarro follow up    RTC: TBD pending labs  Vaccinations: flu shot completed; Evusheld > February 2023; got the bivalent covid vaccine  Annual dermatology visit: discussed today  Preventative: DEXA > June 2023; will need to review colonoscopy, mammogram    Kaylin Triana PA-C  Pulmonary, Allergy, Critical Care and Sleep Medicine        Interval History:     Feeling pretty good, going to pulmonary rehab on Friday. Started the treadmill, no new or unexpected shortness of breath, does have shortness of breath with activity, but doesn't have to stop and catch her breath as she did before. No fever or chills, no nasal or sinus congestion, minimal cough. Pain is controlled, no other chest pain or palpitations. Continues with cycled TF noon-6 am. Still doing drainage from her G port nearly all the time. No bloating or gas, stools are more formed that prior, but loose.          Review of Systems:   Please see HPI, otherwise the complete 10 point ROS is negative.           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     Hepatitis 2017    Hep C, [facility]     HTN (hypertension)      Osteopenia      Past Surgical History:   Procedure Laterality Date     BRONCHOSCOPY (RIGID OR FLEXIBLE), DIAGNOSTIC N/A 8/2/2022    Procedure: BRONCHOSCOPY, DIAGNOSTIC- inspection Bronch;  Surgeon: Kamala Lovell MD;  Location:  GI     BRONCHOSCOPY (RIGID OR  FLEXIBLE), DIAGNOSTIC N/A 9/13/2022    Procedure: INSPECTION BRONCHOSCOPY, WITH BRONCHOALVEOLAR LAVAGE;  Surgeon: Jose R Mccullough MD;  Location:  GI     BRONCHOSCOPY (RIGID OR FLEXIBLE), DIAGNOSTIC N/A 11/9/2022    Procedure: BRONCHOSCOPY, WITH BRONCHOALVEOLAR LAVAGE AND BIOPSY;  Surgeon: Cesar Lima MD;  Location:  GI     BRONCHOSCOPY FLEXIBLE AND RIGID N/A 07/19/2022    Procedure: BRONCHOSCOPY inspection only;  Surgeon: Bob Liao MD;  Location:  GI     COLONOSCOPY  2015     CV CORONARY ANGIOGRAM N/A 06/30/2021    Procedure: CV CORONARY ANGIOGRAM;  Surgeon: Alexander Cuellar MD;  Location:  HEART CARDIAC CATH LAB     CV RIGHT HEART CATH MEASUREMENTS RECORDED N/A 06/30/2021    Procedure: CV RIGHT HEART CATH;  Surgeon: Alexander Cuellar MD;  Location:  HEART CARDIAC CATH LAB     ENDOSCOPIC RETROGRADE CHOLANGIOPANCREATOGRAM N/A 8/11/2022    Procedure: ENDOSCOPIC RETROGRADE CHOLANGIOPANCREATOGRAPHY WITH PANCREATIC DUCT NEEDLE KNIFE AND STENT PLACEMENT, BILE DUCT SPHINCTEROTOMY, BLOOD/DEBRIS REMOVAL AND STENT PLACEMENT;  Surgeon: Cosmo Arroyo MD;  Location:  OR     ENDOSCOPIC RETROGRADE CHOLANGIOPANCREATOGRAM N/A 10/7/2022    Procedure: ENDOSCOPIC RETROGRADE CHOLANGIOPANCREATOGRAPHY with biliary and pancreatic stent removal, debris removal;  Surgeon: Cosmo Arroyo MD;  Location:  OR     ENT SURGERY  1974    tonsillectomy     ENTEROSCOPY SMALL BOWEL N/A 8/11/2022    Procedure: SMALL BOWEL ENTEROSCOPY;  Surgeon: Cosmo Arroyo MD;  Location:  OR     ESOPHAGOGASTRODUODENOSCOPY, WITH NASOGASTRIC TUBE INSERTION N/A 07/01/2022    Procedure: ESOPHAGOGASTRODUODENOSCOPY, WITH NASOJEJUNAL TUBE INSERTION;  Surgeon: Ozzy Nickerson MD;  Location:  GI     ESOPHAGOSCOPY, GASTROSCOPY, DUODENOSCOPY (EGD), COMBINED N/A 8/3/2022    Procedure: ESOPHAGOGASTRODUODENOSCOPY (EGD);  Surgeon: Ira Andres MD;  Location:  GI     HAND SURGERY       INSERT CHEST  TUBE Right 2022    Procedure: Insert chest tube;  Surgeon: Jose R Mccullough MD;  Location: [facility] GI     IR CVC TUNNEL PLACEMENT > 5 YRS OF AGE  2022     IR GASTRO JEJUNOSTOMY TUBE CHANGE  2022     IR GASTRO JEJUNOSTOMY TUBE PLACEMENT  2022     IR THORACENTESIS  2022     LEEP TX, CERVICAL  2017    HECTOR III     LYMPH NODE BIOPSY Left     Left axilla, benign- [place]     MIDLINE INSERTION - DOUBLE LUMEN Left 2022    20cm, Basilic vein     REPLACE GASTROJEJUNOSTOMY TUBE, PERCUTANEOUS  10/7/2022    Procedure: Replace Gastrojejunostomy Tube;  Surgeon: Cosmo Arroyo MD;  Location: [facility] OR     THORACENTESIS Left 2022    Procedure: THORACENTESIS;  Surgeon: Bo Capone PA-C;  Location: [facility] OR     THORACENTESIS Left 2022    Procedure: Thoracentesis;  Surgeon: Jose R Mccullough MD;  Location: [facility] GI     THROMBECTOMY UPPER EXTREMITY Left 2022    Procedure: LEFT RADIAL ARM THROMBECTOMY;  Surgeon: Christie Graham MD;  Location: [facility] OR     TRANSPLANT LUNG RECIPIENT SINGLE X2 Bilateral 2022    Procedure: Clamshell Incision, Bilateral Sequential Lung Transplant, On Cardiopulmonary Bypass, Flexible Bronchoscopy;  Surgeon: Sue Sunshine MD;  Location: [facility] OR           Family History:     No family history on file.         Social History:     Social History     Socioeconomic History     Marital status: Single     Spouse name: Not on file     Number of children: Not on file     Years of education: Not on file     Highest education level: Not on file   Occupational History     Not on file   Tobacco Use     Smoking status: Former     Years: 30.00     Types: Cigarettes     Quit date: 2020     Years since quittin.0     Smokeless tobacco: Never   Substance and Sexual Activity     Alcohol use: Not Currently     Drug use: Not Currently     Types: Marijuana, Methamphetamines     Comment: hx:marijuana and methamphetamine-quit both unsure ?  2-3 yrs  ago     Sexual activity: Not on file   Other Topics Concern     Parent/sibling w/ CABG, MI or angioplasty before 65F 55M? Not Asked   Social History Narrative     Not on file     Social Determinants of Health     Financial Resource Strain: Not on file   Food Insecurity: Not on file   Transportation Needs: Not on file   Physical Activity: Not on file   Stress: Not on file   Social Connections: Not on file   Intimate Partner Violence: Not on file   Housing Stability: Not on file            Medications:     Current Outpatient Medications   Medication     alcohol swab prep pads     azaTHIOprine (IMURAN) 5 mg/mL SUSP     B and C vitamin Complex with folic acid (NEPHRONEX) 0.9 MG/5ML LIQD liquid     blood glucose (CONTOUR NEXT TEST) test strip     blood glucose (NO BRAND SPECIFIED) lancets standard     blood glucose monitoring (CONTOUR NEXT MONITOR W/DEVICE KIT) meter device kit     calcium carbonate 600 mg-vitamin D 400 units (CALTRATE) 600-400 MG-UNIT per tablet     cyanocobalamin (CYANOCOBALAMIN) 500 MCG tablet     dapsone 2 mg/mL SUSP     folic acid (FOLVITE) 1 MG tablet     Guar Gum (FIBER MODULAR, NUTRISOURCE FIBER,) packet     insulin aspart (NOVOLOG PEN) 100 UNIT/ML pen     insulin pen needle (31G X 5 MM) 31G X 5 MM miscellaneous     insulin pen needle (32G X 4 MM) 32G X 4 MM miscellaneous     loperamide (IMODIUM A-D) 2 MG tablet     metoprolol tartrate (LOPRESSOR) 50 MG tablet     Nutritional Supplements (GLUCOSE MANAGEMENT) TABS     nystatin (MYCOSTATIN) 748915 UNIT/ML suspension     ondansetron (ZOFRAN ODT) 4 MG ODT tab     pantoprazole (PROTONIX) 2 mg/mL SUSP suspension     predniSONE (DELTASONE) 5 MG tablet     protein modular (PROSOURCE TF) LIQD     Sharps Container MISC     tacrolimus (GENERIC EQUIVALENT) 1 mg/mL suspension     No current facility-administered medications for this visit.            Physical Exam:   /65   Pulse 95   SpO2 95%     GENERAL: alert, NAD  HEENT: NCAT, EOMI, no scleral  icterus, oral mucosa moist and without lesions  Neck: no cervical or supraclavicular adenopathy  Lungs: moderate airflow; few scattered crackles  CV: RRR, S1S2, + murmur  Abdomen: normoactive BS, soft, non tender; GJ tube in place  Lymph: no edema  Neuro: AAO X 3, CN 2-12 grossly intact  Psychiatric: normal affect, good eye contact  Skin: no rash, jaundice or lesions on limited exam         Data:   All laboratory and imaging data reviewed.      Recent Results (from the past 168 hour(s))   Glucose by meter    Collection Time: 11/09/22 10:46 AM   Result Value Ref Range    GLUCOSE BY METER POCT 128 (H) 70 - 99 mg/dL   BRONCHOSCOPY    Collection Time: 11/09/22 10:50 AM   Result Value Ref Range    Bronchoscopy       [facility]  Endoscopy Department-[facility]  _______________________________________________________________________________  Patient Name: Sofie Rodriguez            Procedure Date: 11/9/2022 10:50 AM  MRN: [id number]                       Account #: [id number]  YOB: 1960               Admit Type: Outpatient  Age: 62                               Gender: Female  Note Status: Finalized                Attending MD: Cesar Lima MD  Pause for the cause: Done             Total Sedation Time: 9 minutes  _______________________________________________________________________________     Procedure:             Bronchoscopy  Indications:           hx of lung transplant, gradually declining lung                          volumes, rising antibody titers  Providers:             Cesar Lima MD, NADIR BONNER MD (Fellow),                          Julienne Olivo RN, Amanda Galo, Technician  Medicines:             4% lidocaine 9cc, 1% lid ocaine 12cc  Requesting Physician:    Complications:         None  _______________________________________________________________________________  Procedure:             Pre-Anesthesia Assessment:                         - A  History and Physical has been performed. Patient                          meds and allergies have been reviewed. The risks and                          benefits of the procedure and the sedation options and                          risks were discussed with the patient. All questions                          were answered and informed consent was obtained.                          Patient identification and proposed procedure were                          verified prior to the procedure by the physician in                          the pre-procedure area. Mental Status Examination:                          normal. ASA Grade Assessment: II - A patient with mild                          systemic disease. After reviewing the risks and                          benefits , the patient was deemed in satisfactory                          condition to undergo the procedure. The anesthesia                          plan was to use minimal sedation / analgesia                          (anxiolysis). Immediately prior to administration of                          medications, the patient was re-assessed for adequacy                          to receive sedatives. The heart rate, respiratory                          rate, oxygen saturations, blood pressure, adequacy of                          pulmonary ventilation, and response to care were                          monitored throughout the procedure. The physical                          status of the patient was re-assessed after the                          procedure.                         After obtaining informed consent, the Bronchoscope was                          introduced through the mouth, via face mask and                          advanced to the tracheobronchial tree. The procedure                           was accomplished without difficulty. The patient                          tolerated the procedure well. The total duration of                          the  procedure was 9 minutes.  Findings:       Vocal cords normal. Trachea normal in appearance with slight rightward        deviation. Elsy normal. R anastomosis site intact with no evidence of        bleeding or dehiscence. L anastomosis site intact with no evidence of        bleeding or dehiscence. R and L tracheobronchial tree normal in        appearance with minimal amounts of thin clear secretions suctioned.        Bronchoscope placed into wedge position within the superior segment of        the lingular bronchus, 120cc of NS lavage in, 40cc of sample retrieved.  Impression:            - Normal exam                         - L and R anastomosis sites intact                         - 40cc of BAL sent for lab analysis from superior                          segment of lingular bronchus  Moderate Sedation:       75mcg fentan yl, 1.5mg versed  Recommendation:        - Await test results.  Attending Participation:       I was present and participated during the entire procedure, including        non-key portions.    Signed electronically by Cesar Lima MD  _______________  Cesar Lima MD  11/9/2022 11:36:44 AM  I was physically present for the entire viewing portion of the exam.  __________________________  Signature of teaching physician  [id number] Lima MD  Number of Addenda: 0    Note Initiated On: 11/9/2022 10:50 AM     Gram Stain    Collection Time: 11/09/22 11:25 AM    Specimen: Bronchus, Left; Bronchial Alveolar Lavage   Result Value Ref Range    Gram Stain Result >25 PMNs/low power field     Gram Stain Result 1+ Mixed josue    KOH Preparation    Collection Time: 11/09/22 11:25 AM    Specimen: Bronchus, Left; Bronchial Alveolar Lavage   Result Value Ref Range    KOH Preparation No fungal elements seen     KOH Preparation Reference Range: No fungal elements seen.    Nocardia species culture    Collection Time: 11/09/22 11:25 AM    Specimen: Bronchus, Left; Bronchial Alveolar Lavage   Result Value Ref Range     Culture No growth after 4 days    Fungal or Yeast Culture Routine    Collection Time: 11/09/22 11:25 AM    Specimen: Bronchus, Left; Bronchial Alveolar Lavage   Result Value Ref Range    Culture No growth after 4 days    Histoplasma Capsulatum Agn Non Blood    Collection Time: 11/09/22 11:25 AM   Result Value Ref Range    See Scanned Result HISTOPLASMA CAPSULATUM AGN NON BLOOD-Scanned    Aspergillus Galactomannan Agn Bronchial    Collection Time: 11/09/22 11:25 AM   Result Value Ref Range    Aspergillus Galactomannan Index 0.07     Aspergillus Galactomannan Antigen BAL Negative Negative   Respiratory Aerobic Bacterial Culture    Collection Time: 11/09/22 11:25 AM    Specimen: Bronchus, Left; Bronchial Alveolar Lavage   Result Value Ref Range    Culture 1+ Normal josue    Acid-Fast Bacilli Culture and Stain    Collection Time: 11/09/22 11:25 AM    Specimen: Bronchus, Left; Bronchial Alveolar Lavage   Result Value Ref Range    Acid Fast Stain No acid fast bacilli seen    CMV Quantitative, PCR    Collection Time: 11/09/22 11:32 AM    Specimen: Bronchus, Left; Bronchial Alveolar Lavage   Result Value Ref Range    CMV DNA IU/mL Not Detected Not Detected IU/mL   Cytology, non-gynecologic    Collection Time: 11/09/22 11:32 AM   Result Value Ref Range    Final Diagnosis       Specimen A     Interpretation:      Negative for malignancy     Other Findings:      No fungal or Pneumocystis organisms are identified on GMS stained preparations.    Viral cytopathic effect is absent.     Adequacy:     Satisfactory for evaluation          Clinical Information       The patient is a 62 year old female s/p BSLT on 6/28/22 for COPD complicated by persistent bilateral pleural effusions.      Gross Description       A(1). Bronchus, Left, lingula:A. Bronchus, Left, lingula, Bronchial Alveolar Lavage with GMS:  Received 15 ml of cloudy, white fluid, concentrated, resuspended and processed as 2 Pap stained direct smears and 2 GMS stained  direct smears.                 Microscopic Description       Microscopic examination was performed.    A resident/fellow in an ACGME accredited training program was involved in the initial review, preparation, and/or interpretation of this case.  I, as the senior physician, attest that I: (i) reviewed patient clinical records if indicated; (ii) reviewed relevant lab test results; (iii) examined the relevant preparation(s) for the specimen(s); and (iv) agree with the report, diagnosis(es), and interpretation as documented by the resident/fellow and edited/confirmed by me. Reporting resident/fellow: Shilpa Pimentel, PGY5        Performing Labs       The technical component of this testing was completed at [facility]     Respiratory Panel PCR    Collection Time: 11/09/22 11:32 AM    Specimen: Bronchus, Left; Bronchial Alveolar Lavage   Result Value Ref Range    Adenovirus Not Detected Not Detected    Coronavirus Not Detected Not Detected    Human Metapneumovirus Not Detected Not Detected    Human Rhin/Enterovirus Not Detected Not Detected    Influenza A Not Detected Not Detected    Influenza A, H1 Not Detected Not Detected    Influenza A 2009 H1N1 Not Detected Not Detected    Influenza A, H3 Not Detected Not Detected    Influenza B Not Detected Not Detected    Parainfluenza Virus 1 Not Detected Not Detected    Parainfluenza Virus 2 Not Detected Not Detected    Parainfluenza Virus 3 Not Detected Not Detected    Parainfluenza Virus 4 Not Detected Not Detected    Respiratory Syncytial Virus A Not Detected Not Detected    Respiratory Syncytial Virus B Not Detected Not Detected    Chlamydia Pneumoniae Not Detected Not Detected    Mycoplasma Pneumoniae Not Detected Not Detected   Cell Count Body Fluid    Collection Time: 11/09/22 11:33 AM   Result Value Ref Range    Color Colorless Colorless, Yellow    Clarity Clear Clear    Total Nucleated Cells 89 /uL     Cell Count Fluid Source Bronchus, Left    Differential Body Fluid    Collection Time: 11/09/22 11:33 AM   Result Value Ref Range    % Neutrophils 3 %    % Lymphocytes 17 %    % Monocyte/Macrophages      % Eosinophils 3 %    % Basophils 1 %    % Other Cells 78 %   General PFT Lab (Please always keep checked)    Collection Time: 11/11/22  7:53 AM   Result Value Ref Range    FVC-Pred 3.06 L    FVC-Pre 1.41 L    FVC-%Pred-Pre 46 %    FEV1-Pre 1.35 L    FEV1-%Pred-Pre 55 %    FEV1FVC-Pred 79 %    FEV1FVC-Pre 96 %    FEFMax-Pred 6.14 L/sec    FEFMax-Pre 3.94 L/sec    FEFMax-%Pred-Pre 64 %    FEF2575-Pred 2.22 L/sec    FEF2575-Pre 3.06 L/sec    EUC3580-%Pred-Pre 137 %    ExpTime-Pre 6.35 sec    FIFMax-Pre 3.69 L/sec    FEV1FEV6-Pred 81 %    FEV1FEV6-Pre 93 %   IgG    Collection Time: 11/11/22  8:29 AM   Result Value Ref Range    Immunoglobulin G 844 610 - 1,616 mg/dL   CBC with platelets    Collection Time: 11/11/22  8:29 AM   Result Value Ref Range    WBC Count 5.6 4.0 - 11.0 10e3/uL    RBC Count 2.90 (L) 3.80 - 5.20 10e6/uL    Hemoglobin 10.1 (L) 11.7 - 15.7 g/dL    Hematocrit 30.9 (L) 35.0 - 47.0 %     (H) 78 - 100 fL    MCH 34.8 (H) 26.5 - 33.0 pg    MCHC 32.7 31.5 - 36.5 g/dL    RDW 14.8 10.0 - 15.0 %    Platelet Count 239 150 - 450 10e3/uL   Magnesium    Collection Time: 11/11/22  8:29 AM   Result Value Ref Range    Magnesium 2.1 1.7 - 2.3 mg/dL   Basic metabolic panel    Collection Time: 11/11/22  8:29 AM   Result Value Ref Range    Sodium 132 (L) 136 - 145 mmol/L    Potassium 4.0 3.4 - 5.3 mmol/L    Chloride 92 (L) 98 - 107 mmol/L    Carbon Dioxide (CO2) 28 22 - 29 mmol/L    Anion Gap 12 7 - 15 mmol/L    Urea Nitrogen 45.7 (H) 8.0 - 23.0 mg/dL    Creatinine 3.10 (H) 0.51 - 0.95 mg/dL    Calcium 9.8 8.8 - 10.2 mg/dL    Glucose 106 (H) 70 - 99 mg/dL    GFR Estimate 17 (L) >60 mL/min/1.73m2   CMV Quantitative, PCR    Collection Time: 11/11/22  8:29 AM    Specimen: Hand, Left; Blood   Result Value Ref Range     CMV DNA IU/mL <137 (A) Not Detected IU/mL    CMV log <2.1    Tacrolimus by Tandem Mass Spectrometry    Collection Time: 11/11/22  8:29 AM   Result Value Ref Range    Tacrolimus by Tandem Mass Spectrometry 11.1 5.0 - 15.0 ug/L    Tacrolimus Last Dose Date 11/10/2022     Tacrolimus Last Dose Time  8:00 PM      PFT interpretation:  Maneuver: valid and met ATS guidelines  Increased ratio with decreased FEV1 and FVC  Compare to prior: FEV1 of 1.35 is 50 ml above prior  Decrease in FVC may be related to restriction; lung volumes would be necessary to determine      Again, thank you for allowing me to participate in the care of your patient.        Sincerely,        Kaylin Triana PA-C

## 2022-11-14 NOTE — PATIENT INSTRUCTIONS
Patient Instructions  1. Continue to hydrate with 60-70 oz fluids daily.  2. Continue to exercise daily or most days of the week.  3. Follow up with your primary care provider for annual gender health maintenance procedures.  4. Follow up with colonoscopy schedule.  5. Follow up with annual dermatology visits.  6. It doesn't seem like the COVID vaccine is working well in lung transplant patients. A number of lung transplant patients have gotten sick with COVID even after receiving the vaccines.  Based on our recent experience, it can be life-threatening to get COVID  even after being vaccinated. Please continue to act like you did not get the COVID vaccine - social distancing, wearing a mask, good hand hygiene, etc. If the people around you are vaccinated, it will help reduce the risk of you getting COVID. All members of your household should be vaccinated.  7. Make sure you stick to full liquids only for now.   8. Girma will put in a referral for a sleep study to be done. Someone will call you to schedule this.       Next transplant clinic appointment:  with CXR, labs and PFTs  Next lab draw:  End of this week      AVS printed at time of check out          ~~~~~~~~~~~~~~~~~~~~~~~~~    Thoracic Transplant Office phone [phone], fax [phone]    Office Hours 8:30 - 5:00     For after-hours urgent issues, please dial [phone], and ask to speak with the Thoracic Transplant Coordinator On-Call.  --------------------  To expedite your medication refill(s), please contact your pharmacy and have them fax a refill request to: [phone]  .   *Please allow 3 business days for routine medication refills.  *Please allow 5 business days for controlled substance medication refills.    **For Diabetic medications and supplies refill(s), please contact your pharmacy and have them contact your Endocrine team.  --------------------  For scheduling appointments call [phone].  --------------------  Please Note: If  you are active on [employer], all future test results will be sent by [employer] message only, and will no longer be called to patient. You may also receive communication directly from your physician.

## 2022-11-14 NOTE — LETTER
PHYSICIAN ORDERS      DATE & TIME ISSUED: 2022 2:32 PM  PATIENT NAME: Sofie Rodriguez Phone number: [phone]   : 1960     [facility] MR# [if applicable]: [id number]     DIAGNOSIS:  Lung Transplant  Z94.2  Please call patient and schedule overnight oximetry study on room air.       Any questions please call: Girma [phone]    Please fax these results to [phone].        Kaylin Triana PA-C

## 2022-11-15 ENCOUNTER — TELEPHONE (OUTPATIENT)
Dept: TRANSPLANT | Facility: CLINIC | Age: 62
End: 2022-11-15

## 2022-11-15 NOTE — TELEPHONE ENCOUNTER
Spoke with Joy at [facility] regarding patients update on going home. Per Dr. Franks and Kaylin Triana patient okay to go home. Joy states she will reach out to Dialysis sites in [facility] to start the process of transferring her care locally with tentative start date of 11/22. Joy states to call back on Friday and she should have more of an update of the plan. Patient will do one more dialysis session here in [place] at [facility] on 11/19 prior to going home.

## 2022-11-16 ENCOUNTER — HOME INFUSION (PRE-WILLOW HOME INFUSION) (OUTPATIENT)
Dept: PHARMACY | Facility: CLINIC | Age: 62
End: 2022-11-16

## 2022-11-16 ENCOUNTER — TELEPHONE (OUTPATIENT)
Dept: TRANSPLANT | Facility: CLINIC | Age: 62
End: 2022-11-16

## 2022-11-16 NOTE — LETTER
PHYSICIAN ORDERS      DATE & TIME ISSUED: 2022 10:11 AM  PATIENT NAME: Sofie Rodriguez   : 1960     [facility] MR# [if applicable]: [id number]     DIAGNOSIS:  Long Term use of medications  Z79.899 and Lung Transplant  Z94.2    Item Frequency   x CBC with platelets Weekly or PRN   x Basic Metabolic Panel Weekly or PRN   x Magnesium Weekly or PRN   x Tacrolimus/Prograf level  (Should be mailed to the [facility] in the  provided to you by the patient.) 12 hour trough level Weekly  Or 1 week after dose change     PLEASE ALSO CHECK IN 2 weeks  PRA Donor Specific Antibody (Lung Transplant) Please use kit provided by patient (should be mailed to [facility] in the  provided to you by the patient).     Any questions please call: [phone]    Please fax these results to [phone].         Claribel Franks MD  Pulmonary Disease

## 2022-11-16 NOTE — PROGRESS NOTES
Spoke with [facility] and spirometry can be done here as well. Faxed orders for PFTs and standing lab orders to [phone]. DSA in 2 weeks noted on orders. Patient noticed via mychart that she will need to call clinic to schedule PFTs.

## 2022-11-16 NOTE — LETTER
PHYSICIAN ORDERS      DATE & TIME ISSUED: 2022 10:09 AM  PATIENT NAME: Sofie Rodriguez   : 1960     [facility] MR# [if applicable]: [id number]     DIAGNOSIS:  Lung Transplant  Z94.2  Patient needs Pulmonary Function Testing (Spirometry ONLY) Please. Please Fax us the results when done. Thank you!      Any questions please call: [phone]    Please fax these results to [phone].         Claribel Franks MD  Pulmonary Disease

## 2022-11-16 NOTE — TELEPHONE ENCOUNTER
Pt likely planning to discharge home on Saturday after dialysis run. (pending given she can start dialysis in [place] locally)    Pt will get labs done weekly.   PFTs and DSA in two weeks.   Pulm rehab referral sent locally.   [facility] notified pt will need TF supplies sent to [place]     POST LUNG TRANSPLANTATION GUIDELINE    DISCHARGE TO PRIMARY CARE PHYSICIAN CHECKLIST    The following was confirmed with the patient prior to discharging to home.       Identify caregiver: Daughter, will be staying with her for a while    Has Txp Handbook: yes    Has BP machine, thermometer, scale and answering machine: yes    PCP demographics in [facility]: Dr. Berrios. Will be scheduling visit with him in January     Preferred Lab demographics in EPIC: yes    Preferred Pharmacy demographics: [place] [facility]     Provided patient with Drug mailers kits: yes- mailed to home    Reviewed transplant office contact information: yes    Outpatient pulmonary rehab plan: yes see above    Return to clinic appointment arranged: yes, scheduled 12/21     MTM appt (Yung Rivera) included at next clinic appt: Message sent to Yung to touch base with patient before discharge home    Enrolled in [employer]: yes     Patient denies any further questions or concerns

## 2022-11-16 NOTE — LETTER
PHYSICIAN ORDERS      DATE & TIME ISSUED: 2022 12:18 PM  PATIENT NAME: Sofie Rodriguez   : 1960     [facility] MR# [if applicable]: [id number]     DIAGNOSIS:  Lung Transplant  Z94.2    Please call patient to set up Pulmonary Rehab for 2 times weekly or however much insurance will allow. Thanks!    Any questions please call: [phone]    Please fax these results to [phone].         Claribel Franks MD  Pulmonary Disease

## 2022-11-17 ENCOUNTER — VIRTUAL VISIT (OUTPATIENT)
Dept: PHARMACY | Facility: CLINIC | Age: 62
End: 2022-11-17
Payer: COMMERCIAL

## 2022-11-17 DIAGNOSIS — T38.0X5A STEROID-INDUCED HYPERGLYCEMIA: Primary | ICD-10-CM

## 2022-11-17 DIAGNOSIS — R73.9 STEROID-INDUCED HYPERGLYCEMIA: Primary | ICD-10-CM

## 2022-11-17 PROCEDURE — 99606 MTMS BY PHARM EST 15 MIN: CPT | Mod: 93 | Performed by: PHARMACIST

## 2022-11-17 NOTE — PATIENT INSTRUCTIONS
"Recommendations from today's MTM visit:                                                       Continue current insulin, let me know if your sugars go over 200 consistently.    Follow-up: 1/13 at 11 AM    It was great speaking with you today.  I value your experience and would be very thankful for your time in providing feedback in our clinic survey. In the next few days, you may receive an email or text message from [facility] [employer] with a link to a survey related to your  clinical pharmacist.\"     To schedule another MTM appointment, please call the clinic directly or you may call the MTM scheduling line at [phone] or toll-free at [phone].     My Clinical Pharmacist's contact information:                                                      Please feel free to contact me with any questions or concerns you have.      Yung Rivera, PharmD  MTM Pharmacist    Phone: [phone]     "

## 2022-11-17 NOTE — PROGRESS NOTES
"Medication Therapy Management (MTM) Encounter    ASSESSMENT:                            Medication Adherence/Access: No issues identified    Steroid induced Hyperglycemia: Overall blood sugars fairly well controlled without any sort of long acting insulin. Tube feedings run from 12-6am. 8 AM sugars still running <150 despite this, during the day after tube feeding is running sugars mostly <200. Patient content with current therapy, she may continue and reach out if sugars start going over 200. No changes today.     PLAN:                            Continue current insulin, let me know if your sugars go over 200 consistently.    Follow-up:  at 11 AM    SUBJECTIVE/OBJECTIVE:                          Sofie Rodriguez is a 62 year old female called for a follow-up visit.  Today's visit is a follow-up MTM visit from 10/24     Reason for visit: diabetes Follow-up .    Allergies/ADRs: Reviewed in chart  Past Medical History: Reviewed in chart  Tobacco: She reports that she quit smoking about 2 years ago. Her smoking use included cigarettes. She has never used smokeless tobacco.  Alcohol: not currently using    Medication Adherence/Access: no issues reported    Steroid induced hyperglycemia:  Currently taking Novolog sliding scale 8, 12, 4, 8 (140-164 1 units, 165-189 2 units etc). Patient is not experiencing side effects.  Prednisone 7.5mg every morning and 5mg every evening.   Blood sugar monitorin TIMES DAILY Ranges (patient reported):   Symptoms of low blood sugar? None at 60-65 so far.   Symptoms of high blood sugar? none  Noon to 6 am tube feeding.  Diet/Exercise: Tube feeding 18 hours, 12pm-6am, eating \"pleasure bites\" of solid food. Food can cause pain.             Lab Results   Component Value Date     A1C 5.8 2022     A1C 5.1 2020      Date 8am 12pm 4pm 8pm    105 163      138 150 182 124   11/15 143 183 178 216    193 102 185 270 (highest she has had)    138 143 174 150    " 104 169 197 197   11/11 174 109 181 153     Date FBG/ 2hours post Lunch/2hours post Dinner /2hours post OVernight   10/24 182         10/23 178 122 160 187   10/22 117 147 196 157   10/21 207 82 208 172     Today's Vitals: There were no vitals taken for this visit.  ----------------    I spent 9 minutes with this patient today. All changes were made via collaborative practice agreement with Kaylin Triana. A copy of the visit note was provided to the patient's provider(s).    The patient was sent via [employer] a summary of these recommendations.     Yung Rivera, PharmD  [facility] Pharmacist    Phone: [phone]     Telemedicine Visit Details  Type of service:  Telephone visit  Start Time: 1:06 PM  End Time: 1:15 PM  Originating Location (pt. Location): Home      Distant Location (provider location):  On-site  Provider has received verbal consent for a visit from the patient? Yes     Medication Therapy Recommendations  No medication therapy recommendations to display

## 2022-11-18 ENCOUNTER — TELEPHONE (OUTPATIENT)
Dept: TRANSPLANT | Facility: CLINIC | Age: 62
End: 2022-11-18

## 2022-11-18 ENCOUNTER — LAB (OUTPATIENT)
Dept: LAB | Facility: CLINIC | Age: 62
End: 2022-11-18
Payer: MEDICARE

## 2022-11-18 ENCOUNTER — HOSPITAL ENCOUNTER (OUTPATIENT)
Dept: CARDIAC REHAB | Facility: CLINIC | Age: 62
Discharge: HOME | End: 2022-11-18
Attending: INTERNAL MEDICINE
Payer: MEDICARE

## 2022-11-18 DIAGNOSIS — Z94.2 LUNG REPLACED BY TRANSPLANT (H): ICD-10-CM

## 2022-11-18 DIAGNOSIS — E53.8 B12 DEFICIENCY: ICD-10-CM

## 2022-11-18 LAB
ANION GAP SERPL CALCULATED.3IONS-SCNC: 14 MMOL/L (ref 7–15)
BUN SERPL-MCNC: 51.1 MG/DL (ref 8–23)
CALCIUM SERPL-MCNC: 9.8 MG/DL (ref 8.8–10.2)
CHLORIDE SERPL-SCNC: 87 MMOL/L (ref 98–107)
CREAT SERPL-MCNC: 3.42 MG/DL (ref 0.51–0.95)
DEPRECATED HCO3 PLAS-SCNC: 25 MMOL/L (ref 22–29)
ERYTHROCYTE [DISTWIDTH] IN BLOOD BY AUTOMATED COUNT: 14.7 % (ref 10–15)
GFR SERPL CREATININE-BSD FRML MDRD: 15 ML/MIN/1.73M2
GLUCOSE SERPL-MCNC: 138 MG/DL (ref 70–99)
HCT VFR BLD AUTO: 32 % (ref 35–47)
HGB BLD-MCNC: 10.4 G/DL (ref 11.7–15.7)
INR PPP: 0.87 (ref 0.85–1.15)
MAGNESIUM SERPL-MCNC: 2.3 MG/DL (ref 1.7–2.3)
MCH RBC QN AUTO: 34.7 PG (ref 26.5–33)
MCHC RBC AUTO-ENTMCNC: 32.5 G/DL (ref 31.5–36.5)
MCV RBC AUTO: 107 FL (ref 78–100)
PLATELET # BLD AUTO: 266 10E3/UL (ref 150–450)
POTASSIUM SERPL-SCNC: 4.7 MMOL/L (ref 3.4–5.3)
RBC # BLD AUTO: 3 10E6/UL (ref 3.8–5.2)
SODIUM SERPL-SCNC: 126 MMOL/L (ref 136–145)
TACROLIMUS BLD-MCNC: 9.1 UG/L (ref 5–15)
TME LAST DOSE: NORMAL H
TME LAST DOSE: NORMAL H
WBC # BLD AUTO: 7.3 10E3/UL (ref 4–11)

## 2022-11-18 PROCEDURE — 83735 ASSAY OF MAGNESIUM: CPT | Performed by: PATHOLOGY

## 2022-11-18 PROCEDURE — 85027 COMPLETE CBC AUTOMATED: CPT | Performed by: PATHOLOGY

## 2022-11-18 PROCEDURE — 85610 PROTHROMBIN TIME: CPT | Performed by: PATHOLOGY

## 2022-11-18 PROCEDURE — 80197 ASSAY OF TACROLIMUS: CPT | Performed by: INTERNAL MEDICINE

## 2022-11-18 PROCEDURE — 80048 BASIC METABOLIC PNL TOTAL CA: CPT | Performed by: PATHOLOGY

## 2022-11-18 PROCEDURE — 36415 COLL VENOUS BLD VENIPUNCTURE: CPT | Performed by: PATHOLOGY

## 2022-11-18 PROCEDURE — G0239 OTH RESP PROC, GROUP: HCPCS

## 2022-11-18 NOTE — RESULT ENCOUNTER NOTE
Na 126. Creatinine 3.42. K+ WDL.     Labs faxed to [facility] dialysis. Talked with Joy from [facility]- she will make sure nephrologist looks at labs and makes adjustments as seen fit for her dialysis run tomorrow.

## 2022-11-18 NOTE — TELEPHONE ENCOUNTER
Pt sent [employer] message stating she was really nauseous last night- was using berry TF formula. Stopped that formula and switched to her regular TF formula. Seemed to help. No vomiting. Pt worried she has not had any gastric contents drain from PEG tube since Tuesday. Instructed patient to flush g-tube port and make sure it's patent and hook up to drainage bag for a while.

## 2022-11-18 NOTE — PROGRESS NOTES
This is a recent snapshot of the patient's [facility] medical record.  For current drug dose and complete information and questions, call [phone] or In Basket pool, fv home infusion ([id number])  CSN Number:  [id number]

## 2022-11-21 ENCOUNTER — TELEPHONE (OUTPATIENT)
Dept: TRANSPLANT | Facility: CLINIC | Age: 62
End: 2022-11-21

## 2022-11-21 DIAGNOSIS — Z94.2 S/P LUNG TRANSPLANT (H): Primary | ICD-10-CM

## 2022-11-23 ENCOUNTER — TELEPHONE (OUTPATIENT)
Dept: TRANSPLANT | Facility: CLINIC | Age: 62
End: 2022-11-23

## 2022-11-23 ENCOUNTER — LAB (OUTPATIENT)
Dept: LAB | Facility: CLINIC | Age: 62
End: 2022-11-23
Payer: MEDICARE

## 2022-11-23 ENCOUNTER — ANCILLARY PROCEDURE (OUTPATIENT)
Dept: GENERAL RADIOLOGY | Facility: CLINIC | Age: 62
End: 2022-11-23
Attending: INTERNAL MEDICINE
Payer: MEDICARE

## 2022-11-23 DIAGNOSIS — Z94.2 LUNG REPLACED BY TRANSPLANT (H): ICD-10-CM

## 2022-11-23 DIAGNOSIS — Z94.2 LUNG REPLACED BY TRANSPLANT (H): Primary | ICD-10-CM

## 2022-11-23 DIAGNOSIS — Z94.2 S/P LUNG TRANSPLANT (H): ICD-10-CM

## 2022-11-23 LAB
ANION GAP SERPL CALCULATED.3IONS-SCNC: 13 MMOL/L (ref 7–15)
BUN SERPL-MCNC: 56.8 MG/DL (ref 8–23)
CALCIUM SERPL-MCNC: 9.7 MG/DL (ref 8.8–10.2)
CHLORIDE SERPL-SCNC: 88 MMOL/L (ref 98–107)
CREAT SERPL-MCNC: 3.68 MG/DL (ref 0.51–0.95)
DEPRECATED HCO3 PLAS-SCNC: 25 MMOL/L (ref 22–29)
ERYTHROCYTE [DISTWIDTH] IN BLOOD BY AUTOMATED COUNT: 14.5 % (ref 10–15)
GFR SERPL CREATININE-BSD FRML MDRD: 13 ML/MIN/1.73M2
GLUCOSE SERPL-MCNC: 125 MG/DL (ref 70–99)
HCT VFR BLD AUTO: 32.8 % (ref 35–47)
HGB BLD-MCNC: 10.8 G/DL (ref 11.7–15.7)
MAGNESIUM SERPL-MCNC: 2.5 MG/DL (ref 1.7–2.3)
MCH RBC QN AUTO: 35.1 PG (ref 26.5–33)
MCHC RBC AUTO-ENTMCNC: 32.9 G/DL (ref 31.5–36.5)
MCV RBC AUTO: 107 FL (ref 78–100)
PLATELET # BLD AUTO: 273 10E3/UL (ref 150–450)
POTASSIUM SERPL-SCNC: 4.5 MMOL/L (ref 3.4–5.3)
RBC # BLD AUTO: 3.08 10E6/UL (ref 3.8–5.2)
SODIUM SERPL-SCNC: 126 MMOL/L (ref 136–145)
TACROLIMUS BLD-MCNC: 11.2 UG/L (ref 5–15)
TME LAST DOSE: NORMAL H
TME LAST DOSE: NORMAL H
WBC # BLD AUTO: 6.6 10E3/UL (ref 4–11)

## 2022-11-23 PROCEDURE — 74019 RADEX ABDOMEN 2 VIEWS: CPT | Performed by: RADIOLOGY

## 2022-11-23 PROCEDURE — 87799 DETECT AGENT NOS DNA QUANT: CPT | Performed by: INTERNAL MEDICINE

## 2022-11-23 PROCEDURE — 83735 ASSAY OF MAGNESIUM: CPT | Performed by: PATHOLOGY

## 2022-11-23 PROCEDURE — 85027 COMPLETE CBC AUTOMATED: CPT | Performed by: PATHOLOGY

## 2022-11-23 PROCEDURE — 36415 COLL VENOUS BLD VENIPUNCTURE: CPT | Performed by: PATHOLOGY

## 2022-11-23 PROCEDURE — 86833 HLA CLASS II HIGH DEFIN QUAL: CPT | Performed by: INTERNAL MEDICINE

## 2022-11-23 PROCEDURE — 86832 HLA CLASS I HIGH DEFIN QUAL: CPT | Performed by: INTERNAL MEDICINE

## 2022-11-23 PROCEDURE — 80048 BASIC METABOLIC PNL TOTAL CA: CPT | Performed by: PATHOLOGY

## 2022-11-23 PROCEDURE — 94375 RESPIRATORY FLOW VOLUME LOOP: CPT | Performed by: INTERNAL MEDICINE

## 2022-11-23 PROCEDURE — 80197 ASSAY OF TACROLIMUS: CPT | Performed by: INTERNAL MEDICINE

## 2022-11-23 NOTE — TELEPHONE ENCOUNTER
Faxed most recent labs to [facility]- Mag slightly high at 2.5. Na continues to be 126. Creatinine worsening at 3.68.    [facility] sure [facility] was received and labs would be reviewed by nephrologist.

## 2022-11-23 NOTE — RESULT ENCOUNTER NOTE
Tacrolimus level 11.2 at 12.5 hours, on 11/23/22.  Goal 8-12.   Current dose 3.5 mg in AM, 3 mg in PM    No change in dose  [employer] message sent

## 2022-11-25 ENCOUNTER — TELEPHONE (OUTPATIENT)
Dept: PHARMACY | Facility: CLINIC | Age: 62
End: 2022-11-25

## 2022-11-25 DIAGNOSIS — T38.0X5A STEROID-INDUCED HYPERGLYCEMIA: ICD-10-CM

## 2022-11-25 DIAGNOSIS — Z94.2 S/P LUNG TRANSPLANT (H): Chronic | ICD-10-CM

## 2022-11-25 DIAGNOSIS — R73.9 STEROID-INDUCED HYPERGLYCEMIA: ICD-10-CM

## 2022-11-25 LAB
CW10: 1467
DONOR IDENTIFICATION: NORMAL
DQB2: NORMAL
DR53: 842
DSA COMMENTS: NORMAL
DSA PRESENT: YES
DSA TEST METHOD: NORMAL
EBV DNA COPIES/ML, INSTRUMENT: 2084 COPIES/ML
EBV DNA SPEC NAA+PROBE-LOG#: 3.3 {LOG_COPIES}/ML
ORGAN: NORMAL
SA 1 CELL: NORMAL
SA 1 TEST METHOD: NORMAL
SA 2 CELL: NORMAL
SA 2 TEST METHOD: NORMAL
SA1 HI RISK ABY: NORMAL
SA1 MOD RISK ABY: NORMAL
SA2 HI RISK ABY: NORMAL
SA2 MOD RISK ABY: NORMAL
UNACCEPTABLE ANTIGENS: NORMAL
UNOS CPRA: 37
ZZZSA 1  COMMENTS: NORMAL
ZZZSA 2 COMMENTS: NORMAL

## 2022-11-25 RX ORDER — METOPROLOL TARTRATE 50 MG
25 TABLET ORAL 2 TIMES DAILY
Qty: 60 TABLET | Refills: 11 | Status: SHIPPED | OUTPATIENT
Start: 2022-11-25 | End: 2023-11-27

## 2022-11-25 NOTE — TELEPHONE ENCOUNTER
Clinical Pharmacy Consult:                                                      Transplant Specific: 3 Month Post Transplant Call   Date of Transplant: 6/28/2022  Type of Transplant: lung  First Transplant: yes  History of rejection: no    Immunosuppression Regimen   TAC 3.5mg qAM & 3mg qPM, Prednisone 7.5mg qAM & 2.5mg qPM and AZA 100mg daily  Patient specific goal: 8-12  Most recent level: 11.2, date 11/23/22  Immunosuppressant Levels:  Therapeutic  Pt adherent to lab draws: yes  Scr:   Lab Results   Component Value Date    CR 3.68 11/23/2022    CR 0.85 06/30/2021     Side effects: no side effects    Prophylactic Medications  Antibacterial:  Dapsone 50mg 3 times per week  Scheduled Discontinue Date: Lifelong    Antifungal: Nystatin  Scheduled Discontinue Date: 6 months    Antiviral: CrCl 10 to 24 mL/minute: Valcyte 450 mg twice weekly   Scheduled Discontinue Date: Therapy Complete    Acid Reducer: Protonix (pantoprazole)  Scheduled Reviewed Date: Lifelong    Thrombosis Prevention: Not on  Scheduled Discontinue Date: N/A    Blood Pressure Management  Frequency of home Blood Pressure checks: twice daily  Most recent home BP: slightly low, but denies dizziness  Patient Blood pressure goal: <140/90  Patient blood pressure at goal:  yes  Hospitalizations/ER visits since last assessment: 0    Medication adherence flowsheet 11/25/2022   Patient medication administration: Responsible for own medications   Patient estimated adherence level: %   Pharmacist assessment of adherence: Excellent   Patient reported doses missed per week: 0-1   Pharmacy MPR: -   Facilitators to medication adherence  Pill box;Medication dosing chart;Cell phone;Schedule/routine   Patient reported barriers to medication adherence  No issues identified   Adherence intervention(s): -     Medication access flowsheet 11/25/2022   Number of pharmacies used: 1   Pharmacy: [facility]   Enrolled in [facility] pharmacy? Yes   Patient  reported barriers to accessing medications: No issues reported by patient   Medication access interventions: -      Med rec/DUR performed: yes  Med Rec Discrepancies: no    Sofie reports feeling really good!  She states she has no side effects, but later does admit that she has 2-3 loose stools per day and takes loperamide twice daily scheduled morning and evening.  She is already on azathioprine and Nutrisource fiber.  She is tolerating it ok.    Sofie is staying locally at the [facility].  She states she should be able to go home any day.  They are just waiting on results of one test.    She uses her pill box, phone alarms, and med card.  She has not been late or missed any doses since transplant.    Blood pressure:  Checks twice daily.  It has been running low.  She is also on dialysis 3 times per week and attributes the lows to this.  She denies any dizziness.      No other questions or concerns today.  MTM follow up on 1/13/22.      Dolly Stone [facility]

## 2022-11-28 ENCOUNTER — TELEPHONE (OUTPATIENT)
Dept: TRANSPLANT | Facility: CLINIC | Age: 62
End: 2022-11-28

## 2022-11-28 LAB
EXPTIME-PRE: 4.85 SEC
FEF2575-%PRED-PRE: 153 %
FEF2575-PRE: 3.4 L/SEC
FEF2575-PRED: 2.22 L/SEC
FEFMAX-%PRED-PRE: 66 %
FEFMAX-PRE: 4.09 L/SEC
FEFMAX-PRED: 6.14 L/SEC
FEV1-%PRED-PRE: 58 %
FEV1-PRE: 1.41 L
FEV1FEV6-PRE: 97 %
FEV1FEV6-PRED: 81 %
FEV1FVC-PRE: 97 %
FEV1FVC-PRED: 79 %
FIFMAX-PRE: 3.97 L/SEC
FVC-%PRED-PRE: 47 %
FVC-PRE: 1.44 L
FVC-PRED: 3.06 L

## 2022-11-28 NOTE — TELEPHONE ENCOUNTER
[facility] called stating pt has been accepted at [facility]  Phone number: [phone]  Fax: [phone]    Pt has an 11:45 AM start chair time     Medical transport to dialysis: T/TH/Sat chair start time 11:45 AM confirmed ride set up through December

## 2022-11-29 ENCOUNTER — TRANSFERRED RECORDS (OUTPATIENT)
Dept: HEALTH INFORMATION MANAGEMENT | Facility: CLINIC | Age: 62
End: 2022-11-29

## 2022-11-29 DIAGNOSIS — E09.65 DRUG OR CHEMICAL INDUCED DIABETES MELLITUS WITH HYPERGLYCEMIA (H): Primary | ICD-10-CM

## 2022-11-29 DIAGNOSIS — T38.0X5A STEROID-INDUCED HYPERGLYCEMIA: ICD-10-CM

## 2022-11-29 DIAGNOSIS — R73.9 STEROID-INDUCED HYPERGLYCEMIA: ICD-10-CM

## 2022-11-30 ENCOUNTER — HOSPITAL ENCOUNTER (OUTPATIENT)
Age: 62
End: 2022-11-30
Attending: STUDENT IN AN ORGANIZED HEALTH CARE EDUCATION/TRAINING PROGRAM
Payer: MEDICARE

## 2022-11-30 ENCOUNTER — TELEPHONE (OUTPATIENT)
Dept: TRANSPLANT | Facility: CLINIC | Age: 62
End: 2022-11-30

## 2022-11-30 PROCEDURE — 99207 PR NO BILLABLE SERVICE THIS VISIT: CPT | Performed by: STUDENT IN AN ORGANIZED HEALTH CARE EDUCATION/TRAINING PROGRAM

## 2022-11-30 NOTE — TELEPHONE ENCOUNTER
Pharmacy at [facility] calls regarding medications.    Medications to be converted to IV.  Dapsone does not come in IV form.  Pentamadine neb will be considered for PJP prophylaxis while patient is unable to eat.  Will switch Valcyte to IV ganciclovir, renally dosed.  Hospital is currently trying to obtain IV Imuran.    Also local ID recommending vancomycin enemas for C. difficile prophylaxis.  Pharmacy to determine a plan for this.

## 2022-11-30 NOTE — TELEPHONE ENCOUNTER
"Called patient to check up on her after triage call early AM.     Daughter states patient started to feel stuffy a few days ago, got worse yesterday- more SOB/congetsed feeling. Pt was walking to the bathroom last evening and felt winded, put home pulse ox on and saturations were 68%. Pt put oxygen on at 5.5L (normally is on RA during the day, uses 1L at night)    Pt desats to 80's with activity despite using 5.5L 02. Pt also c/o feeling nauseous last night.     Pt and family wanting to wait until patient son can bring over oxygen adapter this afternoon so patient can leave the house with oxygen.     Writer instructed daughter to call 911. Pt needs to be evaluated in ER.       Addendum: 9:47 AM   Pt called from [place] ER. Phone number to transplant office and on-call transplant pulmonologist given to RN. RN will call back for report once patient it settled.     Pt currently on 6L 02. PEG tube \"burst\" and \"fell out\" on way to ER, likely burst due to patient trying to unclog this AM.         "

## 2022-11-30 NOTE — PROGRESS NOTES
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 No - PENDING  Reason for requested transfer: Further diagnostic work up, management, and consultation for specialized care   Isolation Needs: Will need private room due to lung transplant within the last 12 months    Outside Records: Not available  Additional records may be faxed to [phone].    Transfer accepted: Yes  Stability of Patient: Patient is vitally stable, with no critical labs, and will likely remain stable throughout the transfer process  Level of Care Needed: Med Surg  Telemetry Needed:  None  Expected Time of Arrival for Transfer: greater than 24 hours  Arrival Location:  [facility]    Recommendations for Management and Stabilization: Not needed    Additional Comments:   Pt is a 62 year old woman with h/o BSLT on 6/28/22 for COPD, c/b persistent bilateral pleural effusions, hypercapnea, right hemidiaphragm palsy, C. diff colitis, gastroparesis s/p GJ tube placement, GI bleed 2/2 pyloric ulcer, hemobilia s/p ERCP and MRCP, persistent vasoplegia, ESRD s/p tunneled line and iHD initiation 9/26, gets HD T/Th/Sat. Also has PMH of HFpEF, NTM colonization, hepatitis C, and methamphetamine use.     Seen in [place] ED for SOB and ANAYA. O2 sats at home 65% and was placed on 5L O2 NC. At baseline, on 1-2L at night and no supplemental O2 during the day. Labs show no leukocytosis, Cr 3.75 (on HD, next run Tues 12/1). CXR with possible developing RLL PNA. No hx of aspiration but does have gastroparesis. Additionally has proximal fracture of GJ tube, attempted replacement with IR at [facility] but the tube fell out and they do not have the  correct type of tube/size to replace it. Currently has a Dong catheter in the site to maintain patency.     ED team has discussed with Dr. Miller of Pulmonology and she would like patient transferred to [facility]. She is on vanc/Zosyn. Blood and sputum cultures pending. She is vitally stable on 4L currently. Appears well on exam. As she is unable to take PO (severe gastroparesis) and no GJ in place, the ED physician will talk to Dr. Miller/pulm team regarding IV immunosuppression.     Accepted for transfer to [facility] with medicine primary team and pulmonology, nephrology (HD), and IR (GJ replacement) consults. Med/surg bed, no telemetry.     Breanna Hampton MD (Sally)  Internal Medicine/Pediatrics  Hospitalist

## 2022-11-30 NOTE — TELEPHONE ENCOUNTER
TRIAGE Daughter called w/concern that pt O2 sats dropping. At one point she recorded 65% - increased O2 to 6L, went up to 87%. Drops <90% with activity.  currently sleeping, 5L O2 - sats at 93%. Prior, during the day she has been > 90% without O2.     They just moved home from [facility] to [facility]. Discussed that pt needs to come back to [facility]. They do not have the O2 adaptive equipment to travel to [facility] or even [facility]. She would need to call an ambulance. Equipment is at pt's son house ~20 mins away.      Since pt is resting with stats >90%, daughter will let her rest and have brother bring equipment in the AM so they can come down to [facility]. If they don't feel she can travel that far - go to [facility]. If stats cont to drop overnoc, call ambulance.     Daughter verbalized understanding of next steps.

## 2022-12-05 ENCOUNTER — TELEPHONE (OUTPATIENT)
Dept: TRANSPLANT | Facility: CLINIC | Age: 62
End: 2022-12-05

## 2022-12-05 NOTE — LETTER
PHYSICIAN ORDERS      DATE & TIME ISSUED: 2022 1:33 PM  PATIENT NAME: Sofie Rodriguez   : 1960     [facility] MR# [if applicable]: [id number]     DIAGNOSIS:  Lung Transplant  Z94.2  Please collect c-difficle toxin and enteric bacteria and virus panel by stool 22.      Any questions please call: Girma [phone]    Please fax these results to [phone].         Claribel Franks MD  Pulmonary Disease

## 2022-12-05 NOTE — TELEPHONE ENCOUNTER
"Pt admitted to [facility] 11/30 discharged home 12/3/22.     Pt is on RA 92-95%. Feels much better breathing wise. Does get a little short of breath with activity, is feeling weak. Pt will make lab appointment for Friday this week.     Stomach feels really upset still, feeling \"off\" nauseous intermittently. Encouraged patient to use PRN zofran to see if that helps. Pt reports stools are pure liquid. Stools were \"mushy\" prior to being admitted. Was started on five day course of Levaquin followed by prophylaxis vanocmycin BID x8 days. Pt also reports cheeks are red and more puffy than usual.     Reports feeding tube is leaking significantly, this started inpatient. [facility] informed patient this was \"normal\" doing to them putting in a smaller tube than what patient previously had. Writer sent IR message to see if they can get [facility] at the [facility] sooner than 1/9 for a tube exchange.     Will consult Dr. Franks:   - send stool studies. Order faxed to [facility]. Pt will drop off sample on Wedneday  -watch flushing in face for now. Pt instructed to call 911 or go to ED if she experiences any signs of anaphylaxis.     "

## 2022-12-06 NOTE — PROGRESS NOTES
Left VM to confirm 1/23/23 procedure date    12/7/22  Pt called back to confirm timing of procedure on 1/23/23. Updated that COVID testing is no longer a requirement, unless exposed or symptomatic. Pt is scheduled for virtual visit with Dr Navarro on 1/11/23 to discuss procedure further.  Message routed to OR

## 2022-12-07 ENCOUNTER — HOSPITAL ENCOUNTER (OUTPATIENT)
Facility: CLINIC | Age: 62
End: 2022-12-07
Attending: INTERNAL MEDICINE | Admitting: INTERNAL MEDICINE
Payer: MEDICARE

## 2022-12-07 DIAGNOSIS — E09.65 DRUG OR CHEMICAL INDUCED DIABETES MELLITUS WITH HYPERGLYCEMIA (H): ICD-10-CM

## 2022-12-07 LAB
BACTERIA BRONCH: NO GROWTH
BACTERIA BRONCH: NO GROWTH

## 2022-12-07 NOTE — PROGRESS NOTES
Providers note faxed to [place] pharmacy so patient can receive BG lancets/test strips.     Pt instructed to call transplant office if she still runs into issues getting supplies.

## 2022-12-09 ENCOUNTER — LAB (OUTPATIENT)
Dept: LAB | Facility: CLINIC | Age: 62
End: 2022-12-09
Payer: MEDICARE

## 2022-12-09 DIAGNOSIS — Z94.2 S/P LUNG TRANSPLANT (H): ICD-10-CM

## 2022-12-09 PROCEDURE — 86833 HLA CLASS II HIGH DEFIN QUAL: CPT

## 2022-12-09 PROCEDURE — 86832 HLA CLASS I HIGH DEFIN QUAL: CPT

## 2022-12-09 PROCEDURE — 80197 ASSAY OF TACROLIMUS: CPT | Performed by: INTERNAL MEDICINE

## 2022-12-10 LAB
TACROLIMUS BLD-MCNC: 13.4 UG/L (ref 5–15)
TME LAST DOSE: NORMAL H
TME LAST DOSE: NORMAL H

## 2022-12-11 ENCOUNTER — APPOINTMENT (OUTPATIENT)
Dept: LAB | Facility: CLINIC | Age: 62
End: 2022-12-11
Payer: MEDICARE

## 2022-12-12 ENCOUNTER — TELEPHONE (OUTPATIENT)
Dept: TRANSPLANT | Facility: CLINIC | Age: 62
End: 2022-12-12

## 2022-12-12 DIAGNOSIS — Z94.2 S/P LUNG TRANSPLANT (H): ICD-10-CM

## 2022-12-12 DIAGNOSIS — Z94.2 S/P LUNG TRANSPLANT (H): Primary | ICD-10-CM

## 2022-12-12 NOTE — TELEPHONE ENCOUNTER
Tacrolimus level 13.4 at 12.5 hours, on 12/9/22.  Goal 8-12.   Current dose 3.5 mg in AM, 3 mg in PM     Dose changed to 3 mg in AM, 3 mg in PM   Recheck level in a week      Discussed with patient    MyChart message sent

## 2022-12-12 NOTE — TELEPHONE ENCOUNTER
Writer attempted to call back Pauline from [facility]. No answer. VM left with call back number.

## 2022-12-12 NOTE — TELEPHONE ENCOUNTER
Patient Call: General  Route to LPN    Reason for call: Pauline from [facility]   called in regards to touch base and follow up on patient. Pauline had a few questions about the patient. Call back number is [phone]    Call back needed? Yes    Return Call Needed  Same as documented in contacts section  When to return call?: Same day: Route High Priority

## 2022-12-13 LAB — HEP C HIM: NORMAL

## 2022-12-14 ENCOUNTER — DOCUMENTATION ONLY (OUTPATIENT)
Dept: TRANSPLANT | Facility: CLINIC | Age: 62
End: 2022-12-14

## 2022-12-14 ENCOUNTER — TRANSFERRED RECORDS (OUTPATIENT)
Dept: HEALTH INFORMATION MANAGEMENT | Facility: CLINIC | Age: 62
End: 2022-12-14

## 2022-12-14 ASSESSMENT — ENCOUNTER SYMPTOMS
FEVER: 0
JAUNDICE: 0
SHORTNESS OF BREATH: 0
WEIGHT GAIN: 0
COUGH DISTURBING SLEEP: 0
FATIGUE: 0
DECREASED APPETITE: 0
POLYPHAGIA: 0
BLOATING: 0
INCREASED ENERGY: 0
NIGHT SWEATS: 0
WHEEZING: 0
CONSTIPATION: 0
CHILLS: 0
BOWEL INCONTINENCE: 0
ALTERED TEMPERATURE REGULATION: 0
DIARRHEA: 1
POLYDIPSIA: 0
RECTAL PAIN: 0
BLOOD IN STOOL: 0
DYSPNEA ON EXERTION: 0
HEMOPTYSIS: 0
POSTURAL DYSPNEA: 0
VOMITING: 0
HALLUCINATIONS: 0
WEIGHT LOSS: 0
COUGH: 0
SNORES LOUDLY: 0
SPUTUM PRODUCTION: 0
NAUSEA: 0
ABDOMINAL PAIN: 0
HEARTBURN: 0

## 2022-12-15 ENCOUNTER — TELEPHONE (OUTPATIENT)
Dept: TRANSPLANT | Facility: CLINIC | Age: 62
End: 2022-12-15

## 2022-12-15 NOTE — LETTER
PHYSICIAN ORDERS      DATE & TIME ISSUED: December 15, 2022 10:46 AM  PATIENT NAME: Sofie Rodriguez   : 1960     [facility] MR# [if applicable]: [id number]     DIAGNOSIS:  Lung Transplant  Z94.2    Please collect bacterial aerobic sputum sample and fungal sputum sample on patient 22.    Any questions please call: Girma [phone]    Please fax these results to [phone].         Claribel Franks MD  Pulmonary Disease

## 2022-12-15 NOTE — LETTER
PHYSICIAN ORDERS      DATE & TIME ISSUED: 2022 7:46 AM  PATIENT NAME: Sofie Rodriguez   : 1960     [facility] MR# [if applicable]: [id number]     DIAGNOSIS:  Lung Transplant  Z94.2  Please complete 2 view chest x-ray.      Any questions please call: Girma [phone]    Please fax these results to [phone].         Claribel Franks MD  Pulmonary Disease

## 2022-12-15 NOTE — TELEPHONE ENCOUNTER
Pt reports having a cold past two days. No increase in oxygen requirements. Has not been around anyone sick recently. Temp running 99.    Per Dr. Franks:   -sputum sample  -covid, RSV/influenza swab (local clinic unable to perform RVP)  -chest x-ray     Orders faxed to [facility]. Pt scheduled for labs tomorrow. Will notify [facility] if symptoms worsen. Will spot check Sp02.

## 2022-12-15 NOTE — LETTER
PHYSICIAN ORDERS      DATE & TIME ISSUED: December 15, 2022 10:48 AM  PATIENT NAME: Sofie Rodriguez   : 1960     [facility] MR# [if applicable]: [id number]     DIAGNOSIS:  Lung Transplant  Z94.2  Please call patient and schedule 2 view chest x-ray for 22      Any questions please call: Girma [phone]    Please fax these results to [phone].         Claribel Franks MD  Pulmonary Disease

## 2022-12-15 NOTE — LETTER
PHYSICIAN ORDERS      DATE & TIME ISSUED: December 15, 2022 10:42 AM  PATIENT NAME: Sofie Rodriguez   : 1960     [facility] MR# [if applicable]: [id number]     DIAGNOSIS:  Lung Transplant  Z94.2  Please perform covid pcr swab, Influenza swab, and RSV swab on patient 22 (she has an appointment made for labs already this today)      Any questions please call: Girma [phone]    Please fax these results to [phone].         Claribel Franks MD  Pulmonary Disease

## 2022-12-15 NOTE — LETTER
PHYSICIAN ORDERS      DATE & TIME ISSUED: December 15, 2022 10:49 AM  PATIENT NAME: Sofie Rodriguez. Phone number: [phone]  : 1960     [facility] MR# [if applicable]: [id number]     DIAGNOSIS:  Lung Transplant  Z94.2  Please call patient and schedule 2 view chest x-ray for 22        Any questions please call: Girma [phone]    Please fax these results to [phone].         Clariebl Franks MD  Pulmonary Disease

## 2022-12-16 ENCOUNTER — TELEPHONE (OUTPATIENT)
Dept: TRANSPLANT | Facility: CLINIC | Age: 62
End: 2022-12-16

## 2022-12-16 ENCOUNTER — TRANSFERRED RECORDS (OUTPATIENT)
Dept: HEALTH INFORMATION MANAGEMENT | Facility: CLINIC | Age: 62
End: 2022-12-16

## 2022-12-16 ENCOUNTER — LAB (OUTPATIENT)
Dept: LAB | Facility: CLINIC | Age: 62
End: 2022-12-16
Payer: MEDICARE

## 2022-12-16 DIAGNOSIS — J10.1 INFLUENZA A: Primary | ICD-10-CM

## 2022-12-16 DIAGNOSIS — J10.1 INFLUENZA A: ICD-10-CM

## 2022-12-16 LAB
CW10: 527
DONOR IDENTIFICATION: NORMAL
DQB2: NORMAL
DSA COMMENTS: NORMAL
DSA PRESENT: YES
DSA TEST METHOD: NORMAL
ORGAN: NORMAL
SA 1 CELL: NORMAL
SA 1 TEST METHOD: NORMAL
SA 2 CELL: NORMAL
SA 2 TEST METHOD: NORMAL
SA1 HI RISK ABY: NORMAL
SA1 MOD RISK ABY: NORMAL
SA2 HI RISK ABY: NORMAL
SA2 MOD RISK ABY: NORMAL
UNACCEPTABLE ANTIGENS: NORMAL
UNOS CPRA: 37
ZZZSA 1  COMMENTS: NORMAL
ZZZSA 2 COMMENTS: NORMAL

## 2022-12-16 PROCEDURE — 80197 ASSAY OF TACROLIMUS: CPT | Performed by: THORACIC SURGERY (CARDIOTHORACIC VASCULAR SURGERY)

## 2022-12-16 RX ORDER — OSELTAMIVIR PHOSPHATE 30 MG/1
CAPSULE ORAL
Qty: 3 CAPSULE | Refills: 0 | Status: SHIPPED | OUTPATIENT
Start: 2022-12-16 | End: 2023-01-11

## 2022-12-16 RX ORDER — OSELTAMIVIR PHOSPHATE 30 MG/1
CAPSULE ORAL
Qty: 3 CAPSULE | Refills: 0 | Status: SHIPPED | OUTPATIENT
Start: 2022-12-16 | End: 2022-12-16

## 2022-12-16 NOTE — TELEPHONE ENCOUNTER
Patient tested positive for influenza A today.  She reports a dry cough and runny nose.  Temp was 100.1 this morning.  Oxygen saturations have been normal.  Notified Dr. Franks and plan to treat with Tamiflu.  Dose for patients on hemodialysis is 30 mg now and then 30 mg after each hemodialysis session not to exceed 5 days from the initial dose.  So she will take 30 mg today, 30 mg tomorrow after dialysis and then 30 mg after dialysis on Tuesday.  Rx sent to her local pharmacy.  Advised she should still go to dialysis tomorrow, but be sure they know she has the flu so they can take adequate precautions.  Advised that she not attend her family gathering tomorrow.  She will let us know if she gets a temp greater than 100.5, worsening shortness of breath, or her oxygen saturations run low.

## 2022-12-17 LAB
TACROLIMUS BLD-MCNC: 10.4 UG/L (ref 5–15)
TME LAST DOSE: NORMAL H
TME LAST DOSE: NORMAL H

## 2022-12-17 NOTE — TELEPHONE ENCOUNTER
Patient called on-call coordinator evening of 12/17 to report that pharmacy never received medication prescription for Tamiflu.  Pharmacy reports they do not have the 30 mg capsules.  Resent prescription to [facility] pharmacy and call to confirm that they have 30 mg capsules in stock.

## 2022-12-19 NOTE — PROGRESS NOTES
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 diff colitis, gastroparesis s/p GJ tube placement, GI bleed 2/2 pyloric ulcer, hemobilia s/p ERCP and MRCP. Other history notable for HFpEF, NTM colonoization, hepatitis C, and methamphetamine use. Recent history complicated by ESRD now s/p tunneled line placement and iHD initiation 9/26. She has had an elevated PRA which we are watching closely. She was recently admitted to OSH 11/30 for pneumonia s/p levaquin X 5 days and diagnosed with influenza A on 12/16. This is a routine follow up.     1. S/p bilateral sequential lung transplant:  Persistent bibasilar pleural effusions:   Right hemidiaphragm palsy: feels like she has improved in the last day, most of her prior symptoms have resolved including cough. No dyspnea, sating 97% on room air. Using 1L NC overnight. S/p bronch on 11/9 with no growth on cultures, no biopsies secondary to elevated BUN. CMV 11/11 negative. PFTs today with improvement in FEV1 by 130 ml. No acute issues  - Will do overnight O2 study on RA, currently wearing 1L at night; consider sleep study for h/o hypercapnia  - On three drug IS including zathioprine 100 mg daily, tacrolimus (goal 8-12) and prednisone taper    Date AM dose (mg) PM dose (mg)   9/15/22 10 7.5   10/13/22 7.5 7.5   11/10/22 7.5 5   12/8/22 5 5   1/5/23 5 2.5      Prophylaxis:   - Dapsone qMWF for PJP ppx (Bactrim stopped d/t hyperkalemia; Pentamidine neb not tolerated)  - Nystatin s/s to end on 12/28     2. Positive DSA: last positive on 12/9 with CW10 (527) and decrease in DQB2 (99342, down from 34074)  - DSA with labs next week    3. EBV viremia: last level of 2084 (log 3.3) on 11/23.   - EBV pending for today     4.  Hypogammaglobulinemia: IgG adequate at time of transplant, repeat level low (336) on 7/28. S/p IVIG 7/30. Last IgG of 844 on 11/11.   - IgG with labs next week     5. ESRD based upon cystatin C (GFR of 10): s/p tunneled line placement and HD initiated 9/26 on T/Th/Sat schedule. Going okay, notes they moved her runs up earlier in the day.   - Nephrology referral here to discuss options for possible future transplant     6. Diarrhea: transitioned from MMF to AZA, also complicated by ongoing tube feeds. Using fiber TID and imodium 2 times/day  - Continue fiber and imodium PRN    7. Severe gastroparesis:   Pyloric ulcer: gastric emptying study 7/20 with severe gastric emptying delay, s/p GJ tube placement in IR 7/27.  S/p EGD 8/11 with mild erythema 2/2 GJ tube trauma seen near insertion site but no active bleeding. Repeat gastric emptying study 9/30 unfortunately with persistent severe retention (91% at 4h); defer adjustments to current plan and consider semi-permanent status. S/p ERCP 10/7 with removal of stents and sludge; PEG/J tube replaced at that time.   - PPI BID  - TF cycled 18 hours; and ALL enteral medications via J tube  - Has not needed G tube to gravity; will have her tube exchanged today  - Gastric emptying study in January with Dr. Navarro follow up    Chronic:  1. 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 Less nasal congestion and sneezing, less cough and no fever. No chills, no PND. Cough is nearly gone, no congestion or tightness. No shortness of breath, chest pain or  palpitations. No nausea or recent vomiting, no abdominal pain, stools are very loose, doing fiber TID and imodium BID.          Review of Systems:   Please see HPI, otherwise the complete 10 point ROS is negative.           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     Hepatitis 2017    Hep C, [facility]     HTN (hypertension)      Osteopenia      Past Surgical History:   Procedure Laterality Date     BRONCHOSCOPY (RIGID OR FLEXIBLE), DIAGNOSTIC N/A 8/2/2022    Procedure: BRONCHOSCOPY, DIAGNOSTIC- inspection Bronch;  Surgeon: Kamala Lovell MD;  Location:  GI     BRONCHOSCOPY (RIGID OR FLEXIBLE), DIAGNOSTIC N/A 9/13/2022    Procedure: INSPECTION BRONCHOSCOPY, WITH BRONCHOALVEOLAR LAVAGE;  Surgeon: Jose R Mccullough MD;  Location:  GI     BRONCHOSCOPY (RIGID OR FLEXIBLE), DIAGNOSTIC N/A 11/9/2022    Procedure: BRONCHOSCOPY, WITH BRONCHOALVEOLAR LAVAGE AND BIOPSY;  Surgeon: Cesar Lima MD;  Location:  GI     BRONCHOSCOPY FLEXIBLE AND RIGID N/A 07/19/2022    Procedure: BRONCHOSCOPY inspection only;  Surgeon: Bob Liao MD;  Location:  GI     COLONOSCOPY  2015     CV CORONARY ANGIOGRAM N/A 06/30/2021    Procedure: CV CORONARY ANGIOGRAM;  Surgeon: Alexander Cuellar MD;  Location:  HEART CARDIAC CATH LAB     CV RIGHT HEART CATH MEASUREMENTS RECORDED N/A 06/30/2021    Procedure: CV RIGHT HEART CATH;  Surgeon: Alexander Cuellar MD;  Location:  HEART CARDIAC CATH LAB     ENDOSCOPIC RETROGRADE CHOLANGIOPANCREATOGRAM N/A 8/11/2022    Procedure: ENDOSCOPIC RETROGRADE CHOLANGIOPANCREATOGRAPHY WITH PANCREATIC DUCT NEEDLE KNIFE AND STENT PLACEMENT, BILE DUCT SPHINCTEROTOMY, BLOOD/DEBRIS REMOVAL AND STENT PLACEMENT;  Surgeon: Cosmo Arroyo MD;  Location:  OR     ENDOSCOPIC RETROGRADE  CHOLANGIOPANCREATOGRAM N/A 10/7/2022    Procedure: ENDOSCOPIC RETROGRADE CHOLANGIOPANCREATOGRAPHY with biliary and pancreatic stent removal, debris removal;  Surgeon: Cosmo Arroyo MD;  Location: [facility] OR     ENT SURGERY  1974    tonsillectomy     ENTEROSCOPY SMALL BOWEL N/A 8/11/2022    Procedure: SMALL BOWEL ENTEROSCOPY;  Surgeon: Cosmo Arroyo MD;  Location: [facility] OR     ESOPHAGOGASTRODUODENOSCOPY, WITH NASOGASTRIC TUBE INSERTION N/A 07/01/2022    Procedure: ESOPHAGOGASTRODUODENOSCOPY, WITH NASOJEJUNAL TUBE INSERTION;  Surgeon: Ozzy Nickerson MD;  Location:  GI     ESOPHAGOSCOPY, GASTROSCOPY, DUODENOSCOPY (EGD), COMBINED N/A 8/3/2022    Procedure: ESOPHAGOGASTRODUODENOSCOPY (EGD);  Surgeon: Ira Andres MD;  Location:  GI     HAND SURGERY       INSERT CHEST TUBE Right 9/13/2022    Procedure: Insert chest tube;  Surgeon: Jose R Mccullough MD;  Location:  GI     IR CVC TUNNEL PLACEMENT > 5 YRS OF AGE  9/26/2022     IR GASTRO JEJUNOSTOMY TUBE CHANGE  8/31/2022     IR GASTRO JEJUNOSTOMY TUBE PLACEMENT  7/27/2022     IR THORACENTESIS  8/29/2022     LEEP TX, CERVICAL  04/07/2017    HECTOR III     LYMPH NODE BIOPSY Left 2005    Left axilla, benign- [place]     MIDLINE INSERTION - DOUBLE LUMEN Left 07/28/2022    20cm, Basilic vein     REPLACE GASTROJEJUNOSTOMY TUBE, PERCUTANEOUS  10/7/2022    Procedure: Replace Gastrojejunostomy Tube;  Surgeon: Cosmo Arroyo MD;  Location: [facility] OR     THORACENTESIS Left 8/29/2022    Procedure: THORACENTESIS;  Surgeon: Bo Capone PA-C;  Location: [facility] OR     THORACENTESIS Left 9/13/2022    Procedure: Thoracentesis;  Surgeon: Jose R Mccullough MD;  Location: [facility] GI     THROMBECTOMY UPPER EXTREMITY Left 07/02/2022    Procedure: LEFT RADIAL ARM THROMBECTOMY;  Surgeon: Christie Graham MD;  Location: [facility] OR     TRANSPLANT LUNG RECIPIENT SINGLE X2 Bilateral 06/28/2022    Procedure: Clamshell Incision, Bilateral Sequential Lung  Transplant, On Cardiopulmonary Bypass, Flexible Bronchoscopy;  Surgeon: Sue Sunshine MD;  Location:  OR           Family History:     No family history on file.         Social History:     Social History     Socioeconomic History     Marital status: Single     Spouse name: Not on file     Number of children: Not on file     Years of education: Not on file     Highest education level: Not on file   Occupational History     Not on file   Tobacco Use     Smoking status: Former     Years: 30.00     Types: Cigarettes     Quit date: 2020     Years since quittin.1     Smokeless tobacco: Never   Substance and Sexual Activity     Alcohol use: Not Currently     Drug use: Not Currently     Types: Marijuana, Methamphetamines     Comment: hx:marijuana and methamphetamine-quit both unsure ?  2-3 yrs ago     Sexual activity: Not on file   Other Topics Concern     Parent/sibling w/ CABG, MI or angioplasty before 65F 55M? Not Asked   Social History Narrative     Not on file     Social Determinants of Health     Financial Resource Strain: Not on file   Food Insecurity: Not on file   Transportation Needs: Not on file   Physical Activity: Not on file   Stress: Not on file   Social Connections: Not on file   Intimate Partner Violence: Not on file   Housing Stability: Not on file            Medications:     Current Outpatient Medications   Medication     alcohol swab prep pads     azaTHIOprine (IMURAN) 5 mg/mL SUSP     B and C vitamin Complex with folic acid (NEPHRONEX) 0.9 MG/5ML LIQD liquid     blood glucose (CONTOUR NEXT TEST) test strip     blood glucose (NO BRAND SPECIFIED) lancets standard     blood glucose monitoring (CONTOUR NEXT MONITOR W/DEVICE KIT) meter device kit     calcium carbonate 600 mg-vitamin D 400 units (CALTRATE) 600-400 MG-UNIT per tablet     cyanocobalamin (CYANOCOBALAMIN) 500 MCG tablet     dapsone 2 mg/mL SUSP     folic acid (FOLVITE) 1 MG tablet     Guar Gum (FIBER MODULAR, NUTRISOURCE FIBER,)  "packet     insulin aspart (NOVOLOG PEN) 100 UNIT/ML pen     insulin pen needle (32G X 4 MM) 32G X 4 MM miscellaneous     loperamide (IMODIUM A-D) 2 MG tablet     metoprolol tartrate (LOPRESSOR) 50 MG tablet     Nutritional Supplements (GLUCOSE MANAGEMENT) TABS     nystatin (MYCOSTATIN) 189044 UNIT/ML suspension     ondansetron (ZOFRAN ODT) 4 MG ODT tab     oseltamivir (TAMIFLU) 30 MG capsule     pantoprazole (PROTONIX) 2 mg/mL SUSP suspension     protein modular (PROSOURCE TF) LIQD     [place] Container MISC     tacrolimus (GENERIC EQUIVALENT) 1 mg/mL suspension     insulin pen needle (31G X 5 MM) 31G X 5 MM miscellaneous     predniSONE (DELTASONE) 5 MG tablet     No current facility-administered medications for this visit.            Physical Exam:   /70   Pulse 101   Resp 17   Ht 1.588 m (5' 2.5\")   Wt 64.3 kg (141 lb 12.8 oz)   SpO2 97%   BMI 25.52 kg/m      GENERAL: alert, NAD  HEENT: NCAT, EOMI, no scleral icterus, oral mucosa moist and without lesions  Neck: no cervical or supraclavicular adenopathy  Lungs: moderate airflow, decreased in bases with few fine crackles  CV: RRR, S1S2, + murmur  Abdomen: normoactive BS, soft, non tender; GJ tube in place  Lymph: no edema  Neuro: AAO X 3, CN 2-12 grossly intact  Psychiatric: normal affect, good eye contact  Skin: no rash, jaundice or lesions on limited exam         Data:   All laboratory and imaging data reviewed.      Recent Results (from the past 168 hour(s))   Tacrolimus by Tandem Mass Spectrometry    Collection Time: 12/16/22  7:57 AM   Result Value Ref Range    Tacrolimus by Tandem Mass Spectrometry 10.4 5.0 - 15.0 ug/L    Tacrolimus Last Dose Date 12/15/2022     Tacrolimus Last Dose Time  8:00 PM    External Lab Results    Collection Time: 12/16/22  8:02 AM   Result Value Ref Range    Scan Lab Results (External) See Scanned Report (A)    CBC with Platelets & Differential    Collection Time: 12/16/22  8:02 AM   Result Value Ref Range    WBC Count " (External) 4.2 Not provided 10(3)/uL    RBC Count (External) 2.46 Not provided 10(6)/uL    Hemoglobin (External) 9.0 Not provided g/dL    Hematocrit (External) 29.4 Not provided %    MCV (External) 119.5 Not provided fL    MCH (External) 36.6 Not provided pg    MCHC (External) 30.6 (L) 32.0 - 36.0 g/dL    RDW (External) 15.7 Not provided %    Platelet Count (External) 292 146 - 368 10(3)/uL    % Neutrophils (External) 64.0 49.0 - 73.0 %    % Bands (External) 2.0 0.0 - 6.0 %    % Lymphocytes (External) 12.0 (L) 17.0 - 38.0 %    % Monocytes (External) 10.0 2.0 - 13.0 %    % Eosinophils (External) 10.0 (H) 0.0 - 4.0 %    % Basophils (External) 2.0 0.0 - 2.0 %    Absolute Neutrophils (External) 2.7 1.8 - 9.2 10(3)/uL    Absolute Bands (External) 0.1 10(3)/uL    Absolute Lymphocytes (External) 0.5 (L) 1.2 - 3.9 10(3)/uL    Absolute Monocytes (External) 0.4 0.0 - 1.1 10(3)/uL    Absolute Eosinophils (External) 0.4 0.0 - 0.8 10(3)/uL    Absolute Basophils (External) 0.1 0.0 - 0.2 10(3)/uL    Method (External) Manual    Basic metabolic panel    Collection Time: 12/16/22  8:02 AM   Result Value Ref Range    Sodium (External) 138 136 - 146 mmol/L    Potassium (External) 3.9 3.5 - 5.1 mmol/L    Chloride (External) (External) 97 (L) 98 - 107 mmol/L    CO2 (External) 29 22 - 29 mmol/L    Anion Gap (External) 15.9 10.0 - 20.0 mmol/L    Creatinine (External) 4.18 mg/dL    Urea Nitrogen (External) 38.8 (H) 10.0 - 20.0 mg/dL    BUN/Creatinine Ratio (External) 9.28 (L) 11.70 - 22.90 ratio    Calcium (External) 9.7 8.6 - 10.5 mg/dL    Glucose (External) 113 (H) 70 - 100 mg/dL    GFR Estimated (External) 12 (L) >=60 ml/min/1.73m2   Magnesium    Collection Time: 12/16/22  8:02 AM   Result Value Ref Range    Magnesium (External) 2.4 1.8 - 2.6 mg/dL   COVID-19 Virus (Coronavirus) by PCR (External Result)    Collection Time: 12/16/22  8:02 AM   Result Value Ref Range    COVID-19 Virus by PCR (External Result) Undetected Undetected    COVID-19 VIRUS (CORONAVIRUS) BY PCR (EXTERNAL RESULT)    Collection Time: 12/16/22  8:02 AM   Result Value Ref Range    Specimen Source Oropharynx     COVID-19 Virus PCR to [facility] - Result Undetected Undetected    SARS-CoV-2 PCR Comment SEE COMMENTS    CBC with platelets    Collection Time: 12/21/22 10:51 AM   Result Value Ref Range    WBC Count 3.4 (L) 4.0 - 11.0 10e3/uL    RBC Count 2.73 (L) 3.80 - 5.20 10e6/uL    Hemoglobin 9.9 (L) 11.7 - 15.7 g/dL    Hematocrit 31.8 (L) 35.0 - 47.0 %     (H) 78 - 100 fL    MCH 36.3 (H) 26.5 - 33.0 pg    MCHC 31.1 (L) 31.5 - 36.5 g/dL    RDW 15.5 (H) 10.0 - 15.0 %    Platelet Count 241 150 - 450 10e3/uL   Magnesium    Collection Time: 12/21/22 10:51 AM   Result Value Ref Range    Magnesium 2.6 (H) 1.7 - 2.3 mg/dL   Basic metabolic panel    Collection Time: 12/21/22 10:51 AM   Result Value Ref Range    Sodium 139 136 - 145 mmol/L    Potassium 4.2 3.4 - 5.3 mmol/L    Chloride 93 (L) 98 - 107 mmol/L    Carbon Dioxide (CO2) 33 (H) 22 - 29 mmol/L    Anion Gap 13 7 - 15 mmol/L    Urea Nitrogen 43.1 (H) 8.0 - 23.0 mg/dL    Creatinine 4.13 (H) 0.51 - 0.95 mg/dL    Calcium 9.7 8.8 - 10.2 mg/dL    Glucose 131 (H) 70 - 99 mg/dL    GFR Estimate 12 (L) >60 mL/min/1.73m2   General PFT Lab (Please always keep checked)    Collection Time: 12/21/22 10:58 AM   Result Value Ref Range    FVC-Pred 2.81 L    FVC-Pre 1.59 L    FVC-%Pred-Pre 56 %    FEV1-Pre 1.54 L    FEV1-%Pred-Pre 68 %    FEV1FVC-Pred 80 %    FEV1FVC-Pre 97 %    FEFMax-Pred 6.14 L/sec    FEFMax-Pre 5.16 L/sec    FEFMax-%Pred-Pre 84 %    FEF2575-Pred 2.11 L/sec    FEF2575-Pre 3.50 L/sec    IRF4244-%Pred-Pre 166 %    ExpTime-Pre 5.99 sec    FIFMax-Pre 3.59 L/sec    FEV1FEV6-Pred 81 %    FEV1FEV6-Pre 96 %     PFT interpretation:  Maneuver: valid and met ATS guidelines  Mild obstruction with Z score -2.09  Compared to prior: FEV1 of 1.54 is 130 ml above prior

## 2022-12-19 NOTE — RESULT ENCOUNTER NOTE
Tacrolimus level 10.4 at 12 hours, on 12/16/22.  Goal 8-12.   Current dose 3 mg in AM, 3 mg in PM    No change in dose  [email or web address] In[employer] message sent

## 2022-12-20 ENCOUNTER — TELEPHONE (OUTPATIENT)
Dept: TRANSPLANT | Facility: CLINIC | Age: 62
End: 2022-12-20

## 2022-12-20 DIAGNOSIS — Z00.6 RESEARCH STUDY PATIENT: Primary | ICD-10-CM

## 2022-12-20 DIAGNOSIS — N18.32 ANEMIA OF CHRONIC RENAL FAILURE, STAGE 3B (H): ICD-10-CM

## 2022-12-20 DIAGNOSIS — D63.1 ANEMIA OF CHRONIC RENAL FAILURE, STAGE 3B (H): ICD-10-CM

## 2022-12-20 RX ORDER — B COMPLEX C NO.10/FOLIC ACID 900MCG/5ML
5 LIQUID (ML) ORAL DAILY
Qty: 237 ML | Refills: 11 | Status: SHIPPED | OUTPATIENT
Start: 2022-12-20 | End: 2023-02-28

## 2022-12-20 NOTE — TELEPHONE ENCOUNTER
33 year old with DM , prior CVA, ESRD presented with bowel ischemia s/p bowel resection.     Now with sepsis syndrome, persistent leukocytosis.     Underlying risk factor for bowel ischemia/ CVA/ splenic infarct is not clear    1) Leukocytosis  OR culture + on 10/21  s/p bowel resection and 15 d of abx  Abd wound not grossly infcted but not healthy appearing.  Wound care    I think infection is sequalae of ischemia/ clotting events. I do not think primary problem is an infection  But need to monitor wound closely- high risk of developing infection as tissue does not look healthy    2) Mold/ yeast in blood  Candida glabrata and rhodotorola    Continue ambisome  Close monitoring/ replacment of K, phos, Mg, cailcium    Repeat blood culture Tomorrow  Change central line as soon as feasible  Check echo      2) elevated procalcitonin  ESRD  Not clear that this is a reliable marker in this patient  Repeat blood culture 10/28 without growth    3) Right toe ulcer/ finger ulcer  appears chronic  Not grossly infected    Hematology Rheumatology evaluating for underliyng cause of her clotting    D/w SICU     Last fever four days ago, 100.1. Per Rema from imaging, pt should be okay to proceed with PEG/J exchange tomorrow after clinic appointment as scheduled. Rema will call [facility]CC back if for some reason patient can't have procedure done.    33 year old with DM , prior CVA, ESRD presented with bowel ischemia s/p bowel resection.     Now with sepsis syndrome, persistent leukocytosis.     Underlying risk factor for bowel ischemia/ CVA/ splenic infarct is not clear    1) Leukocytosis  OR culture + on 10/21  s/p bowel resection and 15 d of abx  Abd wound not grossly infcted but not healthy appearing.  Wound care    I think infection is sequalae of ischemia/ clotting events. I do not think primary problem is an infection  But need to monitor wound closely- high risk of developing infection as tissue does not look healthy    2) Mold/ yeast in blood  Candida glabrata and rhodotorola    Continue ambisome  Close monitoring/ replacment of K, phos, Mg, cailcium    Repeat blood culture Tomorrow  Check echo      2) elevated procalcitonin  ESRD  Not clear that this is a reliable marker in this patient  Repeat blood culture 10/28 without growth    3) Right toe ulcer/ finger ulcer  appears chronic  Not grossly infected    Hematology Rheumatology evaluating for underliyng cause of her clotting    D/w SICU

## 2022-12-21 ENCOUNTER — ANCILLARY PROCEDURE (OUTPATIENT)
Dept: GENERAL RADIOLOGY | Facility: CLINIC | Age: 62
End: 2022-12-21
Attending: PHYSICIAN ASSISTANT
Payer: MEDICARE

## 2022-12-21 ENCOUNTER — LAB (OUTPATIENT)
Dept: LAB | Facility: CLINIC | Age: 62
End: 2022-12-21
Attending: PHYSICIAN ASSISTANT
Payer: MEDICARE

## 2022-12-21 ENCOUNTER — OFFICE VISIT (OUTPATIENT)
Dept: PULMONOLOGY | Facility: CLINIC | Age: 62
End: 2022-12-21
Attending: PHYSICIAN ASSISTANT
Payer: MEDICARE

## 2022-12-21 VITALS
HEIGHT: 63 IN | SYSTOLIC BLOOD PRESSURE: 118 MMHG | BODY MASS INDEX: 25.12 KG/M2 | DIASTOLIC BLOOD PRESSURE: 70 MMHG | OXYGEN SATURATION: 97 % | WEIGHT: 141.8 LBS | HEART RATE: 101 BPM | RESPIRATION RATE: 17 BRPM

## 2022-12-21 DIAGNOSIS — Z94.2 LUNG REPLACED BY TRANSPLANT (H): Primary | ICD-10-CM

## 2022-12-21 DIAGNOSIS — Z94.2 LUNG REPLACED BY TRANSPLANT (H): ICD-10-CM

## 2022-12-21 DIAGNOSIS — D80.1 HYPOGAMMAGLOBULINEMIA (H): ICD-10-CM

## 2022-12-21 DIAGNOSIS — Z94.2 S/P LUNG TRANSPLANT (H): Primary | ICD-10-CM

## 2022-12-21 DIAGNOSIS — N18.6 ESRD (END STAGE RENAL DISEASE) (H): ICD-10-CM

## 2022-12-21 DIAGNOSIS — Z93.4 GASTROJEJUNOSTOMY TUBE STATUS (H): ICD-10-CM

## 2022-12-21 DIAGNOSIS — Z94.2 S/P LUNG TRANSPLANT (H): Primary | Chronic | ICD-10-CM

## 2022-12-21 LAB
ANION GAP SERPL CALCULATED.3IONS-SCNC: 13 MMOL/L (ref 7–15)
BUN SERPL-MCNC: 43.1 MG/DL (ref 8–23)
CALCIUM SERPL-MCNC: 9.7 MG/DL (ref 8.8–10.2)
CHLORIDE SERPL-SCNC: 93 MMOL/L (ref 98–107)
CREAT SERPL-MCNC: 4.13 MG/DL (ref 0.51–0.95)
DEPRECATED HCO3 PLAS-SCNC: 33 MMOL/L (ref 22–29)
ERYTHROCYTE [DISTWIDTH] IN BLOOD BY AUTOMATED COUNT: 15.5 % (ref 10–15)
EXPTIME-PRE: 5.99 SEC
FEF2575-%PRED-PRE: 166 %
FEF2575-PRE: 3.5 L/SEC
FEF2575-PRED: 2.11 L/SEC
FEFMAX-%PRED-PRE: 84 %
FEFMAX-PRE: 5.16 L/SEC
FEFMAX-PRED: 6.14 L/SEC
FEV1-%PRED-PRE: 68 %
FEV1-PRE: 1.54 L
FEV1FEV6-PRE: 96 %
FEV1FEV6-PRED: 81 %
FEV1FVC-PRE: 97 %
FEV1FVC-PRED: 80 %
FIFMAX-PRE: 3.59 L/SEC
FVC-%PRED-PRE: 56 %
FVC-PRE: 1.59 L
FVC-PRED: 2.81 L
GFR SERPL CREATININE-BSD FRML MDRD: 12 ML/MIN/1.73M2
GLUCOSE SERPL-MCNC: 131 MG/DL (ref 70–99)
HCT VFR BLD AUTO: 31.8 % (ref 35–47)
HGB BLD-MCNC: 9.9 G/DL (ref 11.7–15.7)
MAGNESIUM SERPL-MCNC: 2.6 MG/DL (ref 1.7–2.3)
MCH RBC QN AUTO: 36.3 PG (ref 26.5–33)
MCHC RBC AUTO-ENTMCNC: 31.1 G/DL (ref 31.5–36.5)
MCV RBC AUTO: 117 FL (ref 78–100)
PLATELET # BLD AUTO: 241 10E3/UL (ref 150–450)
POTASSIUM SERPL-SCNC: 4.2 MMOL/L (ref 3.4–5.3)
RBC # BLD AUTO: 2.73 10E6/UL (ref 3.8–5.2)
SODIUM SERPL-SCNC: 139 MMOL/L (ref 136–145)
TACROLIMUS BLD-MCNC: 11.9 UG/L (ref 5–15)
TME LAST DOSE: NORMAL H
TME LAST DOSE: NORMAL H
WBC # BLD AUTO: 3.4 10E3/UL (ref 4–11)

## 2022-12-21 PROCEDURE — 83735 ASSAY OF MAGNESIUM: CPT | Performed by: PATHOLOGY

## 2022-12-21 PROCEDURE — 71046 X-RAY EXAM CHEST 2 VIEWS: CPT | Performed by: RADIOLOGY

## 2022-12-21 PROCEDURE — 85027 COMPLETE CBC AUTOMATED: CPT | Performed by: PATHOLOGY

## 2022-12-21 PROCEDURE — 87799 DETECT AGENT NOS DNA QUANT: CPT | Performed by: INTERNAL MEDICINE

## 2022-12-21 PROCEDURE — 94375 RESPIRATORY FLOW VOLUME LOOP: CPT | Performed by: PHYSICIAN ASSISTANT

## 2022-12-21 PROCEDURE — 36415 COLL VENOUS BLD VENIPUNCTURE: CPT | Performed by: PATHOLOGY

## 2022-12-21 PROCEDURE — 99214 OFFICE O/P EST MOD 30 MIN: CPT | Mod: 25 | Performed by: PHYSICIAN ASSISTANT

## 2022-12-21 PROCEDURE — 49452 REPLACE G-J TUBE PERC: CPT | Performed by: PHYSICIAN ASSISTANT

## 2022-12-21 PROCEDURE — G0463 HOSPITAL OUTPT CLINIC VISIT: HCPCS | Performed by: PHYSICIAN ASSISTANT

## 2022-12-21 PROCEDURE — G0463 HOSPITAL OUTPT CLINIC VISIT: HCPCS | Mod: 25

## 2022-12-21 PROCEDURE — 80197 ASSAY OF TACROLIMUS: CPT | Performed by: INTERNAL MEDICINE

## 2022-12-21 PROCEDURE — 80048 BASIC METABOLIC PNL TOTAL CA: CPT | Performed by: PATHOLOGY

## 2022-12-21 RX ORDER — IOPAMIDOL 510 MG/ML
100 INJECTION, SOLUTION INTRAVASCULAR ONCE
Status: COMPLETED | OUTPATIENT
Start: 2022-12-21 | End: 2022-12-21

## 2022-12-21 RX ADMIN — IOPAMIDOL 100 ML: 510 INJECTION, SOLUTION INTRAVASCULAR at 15:13

## 2022-12-21 ASSESSMENT — PAIN SCALES - GENERAL: PAINLEVEL: NO PAIN (0)

## 2022-12-21 NOTE — NURSING NOTE
Chief Complaint   Patient presents with     RECHECK     Return Lung TX    Medications reviewed and vital signs taken.   Brenda Guzmán, CMA

## 2022-12-21 NOTE — PROGRESS NOTES
Sofie DIANE Rodriguez  [id number]    Patient with an existing gastrojejunostomy tube, appointment made for concerns of leakage after tube was replaced approximately 3 weeks ago at an outside facility.    Patient presents with button, low-profile gastrojejunostomy tube.  She states that has intermittent leaking.  She prefers a unibody GJ tube.    18 Bahamian GJ button tube exchanged over the wire for unibody 18 Bahamian by 45 cm standard gastrojejunostomy tube.  Tube is okay to use.  Routine exchange expected in 9 to 12 months.

## 2022-12-21 NOTE — LETTER
12/21/2022         RE: Sofie Rodriguez  [address] [place] [address]        Dear Colleague,    Thank you for referring your patient, Sofie Rodriguez, to the [facility] [place]. Please see a copy of my visit note below.    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 diff colitis, gastroparesis s/p GJ tube placement, GI bleed 2/2 pyloric ulcer, hemobilia s/p ERCP and MRCP. Other history notable for HFpEF, NTM colonoization, hepatitis C, and methamphetamine use. Recent history complicated by ESRD now s/p tunneled line placement and iHD initiation 9/26. She has had an elevated PRA which we are watching closely. She was recently admitted to OSH 11/30 for pneumonia s/p levaquin X 5 days and diagnosed with influenza A on 12/16. This is a routine follow up.     1. S/p bilateral sequential lung transplant:  Persistent bibasilar pleural effusions:   Right hemidiaphragm palsy: feels like she has improved in the last day, most of her prior symptoms have resolved including cough. No dyspnea, sating 97% on room air. Using 1L NC overnight. S/p bronch on 11/9 with no growth on cultures, no biopsies secondary to elevated BUN. CMV 11/11 negative. PFTs today with improvement in FEV1 by 130 ml. No acute issues  - Will do overnight O2 study on RA, currently wearing 1L at night; consider sleep study for h/o hypercapnia  - On three drug IS including zathioprine 100 mg daily, tacrolimus (goal 8-12) and prednisone taper    Date AM dose (mg) PM dose (mg)   9/15/22 10 7.5   10/13/22 7.5 7.5   11/10/22 7.5 5   12/8/22 5 5   1/5/23 5 2.5      Prophylaxis:   - Dapsone qMWF for PJP ppx (Bactrim stopped d/t  hyperkalemia; Pentamidine neb not tolerated)  - Nystatin s/s to end on 12/28     2. Positive DSA: last positive on 12/9 with CW10 (527) and decrease in DQB2 (00714, down from 17401)  - DSA with labs next week    3. EBV viremia: last level of 2084 (log 3.3) on 11/23.   - EBV pending for today     4. Hypogammaglobulinemia: IgG adequate at time of transplant, repeat level low (336) on 7/28. S/p IVIG 7/30. Last IgG of 844 on 11/11.   - IgG with labs next week     5. ESRD based upon cystatin C (GFR of 10): s/p tunneled line placement and HD initiated 9/26 on T/Th/Sat schedule. Going okay, notes they moved her runs up earlier in the day.   - Nephrology referral here to discuss options for possible future transplant     6. Diarrhea: transitioned from MMF to AZA, also complicated by ongoing tube feeds. Using fiber TID and imodium 2 times/day  - Continue fiber and imodium PRN    7. Severe gastroparesis:   Pyloric ulcer: gastric emptying study 7/20 with severe gastric emptying delay, s/p GJ tube placement in IR 7/27.  S/p EGD 8/11 with mild erythema 2/2 GJ tube trauma seen near insertion site but no active bleeding. Repeat gastric emptying study 9/30 unfortunately with persistent severe retention (91% at 4h); defer adjustments to current plan and consider semi-permanent status. S/p ERCP 10/7 with removal of stents and sludge; PEG/J tube replaced at that time.   - PPI BID  - TF cycled 18 hours; and ALL enteral medications via J tube  - Has not needed G tube to gravity; will have her tube exchanged today  - Gastric emptying study in January with Dr. Navarro follow up    Chronic:  1. 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 Less nasal congestion and sneezing, less cough and no fever. No chills, no PND. Cough is nearly gone, no congestion or tightness. No shortness of breath, chest pain or palpitations. No nausea or recent vomiting, no abdominal pain, stools are very loose, doing fiber TID and imodium BID.          Review of Systems:   Please see HPI, otherwise the complete 10 point ROS is negative.           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     Hepatitis 2017    Hep C, [facility]     HTN (hypertension)      Osteopenia      Past Surgical History:   Procedure Laterality Date     BRONCHOSCOPY (RIGID OR FLEXIBLE), DIAGNOSTIC N/A 8/2/2022    Procedure: BRONCHOSCOPY, DIAGNOSTIC- inspection Bronch;  Surgeon: Kamala Lovell MD;  Location:  GI     BRONCHOSCOPY (RIGID OR FLEXIBLE), DIAGNOSTIC N/A 9/13/2022    Procedure: INSPECTION BRONCHOSCOPY, WITH BRONCHOALVEOLAR LAVAGE;  Surgeon: Jose R Mccullough MD;  Location:  GI     BRONCHOSCOPY (RIGID OR FLEXIBLE), DIAGNOSTIC N/A 11/9/2022    Procedure: BRONCHOSCOPY, WITH BRONCHOALVEOLAR LAVAGE AND BIOPSY;  Surgeon: Cesar Lima MD;  Location:  GI     BRONCHOSCOPY FLEXIBLE AND RIGID N/A 07/19/2022    Procedure: BRONCHOSCOPY inspection only;  Surgeon: Bob Liao MD;  Location:  GI     COLONOSCOPY  2015     CV CORONARY ANGIOGRAM N/A 06/30/2021    Procedure: CV CORONARY ANGIOGRAM;  Surgeon: Alexander Cuellar MD;  Location:  HEART CARDIAC CATH LAB     CV RIGHT HEART CATH MEASUREMENTS RECORDED N/A 06/30/2021    Procedure: CV RIGHT HEART CATH;  Surgeon: Alexander Cuellar MD;  Location:  HEART CARDIAC CATH LAB     ENDOSCOPIC RETROGRADE  CHOLANGIOPANCREATOGRAM N/A 8/11/2022    Procedure: ENDOSCOPIC RETROGRADE CHOLANGIOPANCREATOGRAPHY WITH PANCREATIC DUCT NEEDLE KNIFE AND STENT PLACEMENT, BILE DUCT SPHINCTEROTOMY, BLOOD/DEBRIS REMOVAL AND STENT PLACEMENT;  Surgeon: Cosmo Arroyo MD;  Location: [facility] OR     ENDOSCOPIC RETROGRADE CHOLANGIOPANCREATOGRAM N/A 10/7/2022    Procedure: ENDOSCOPIC RETROGRADE CHOLANGIOPANCREATOGRAPHY with biliary and pancreatic stent removal, debris removal;  Surgeon: Cosmo Arroyo MD;  Location: [facility] OR     ENT SURGERY  1974    tonsillectomy     ENTEROSCOPY SMALL BOWEL N/A 8/11/2022    Procedure: SMALL BOWEL ENTEROSCOPY;  Surgeon: Cosmo Arroyo MD;  [facility]     ESOPHAGOGASTRODUODENOSCOPY, WITH NASOGASTRIC TUBE INSERTION N/A 07/01/2022    Procedure: ESOPHAGOGASTRODUODENOSCOPY, WITH NASOJEJUNAL TUBE INSERTION;  Surgeon: Ozzy Nickerson MD;  Location:  GI     ESOPHAGOSCOPY, GASTROSCOPY, DUODENOSCOPY (EGD), COMBINED N/A 8/3/2022    Procedure: ESOPHAGOGASTRODUODENOSCOPY (EGD);  Surgeon: Ira Andres MD;  [facility]     HAND SURGERY       INSERT CHEST TUBE Right 9/13/2022    Procedure: Insert chest tube;  Surgeon: Jose R Mccullough MD;  [facility]     IR CVC TUNNEL PLACEMENT > 5 YRS OF AGE  9/26/2022     IR GASTRO JEJUNOSTOMY TUBE CHANGE  8/31/2022     IR GASTRO JEJUNOSTOMY TUBE PLACEMENT  7/27/2022     IR THORACENTESIS  8/29/2022     LEEP TX, CERVICAL  04/07/2017    HECTOR III     LYMPH NODE BIOPSY Left 2005    Left axilla, benign- [place]     MIDLINE INSERTION - DOUBLE LUMEN Left 07/28/2022    20cm, Basilic vein     REPLACE GASTROJEJUNOSTOMY TUBE, PERCUTANEOUS  10/7/2022    Procedure: Replace Gastrojejunostomy Tube;  Surgeon: Cosmo Arroyo MD;  [facility]     THORACENTESIS Left 8/29/2022    Procedure: THORACENTESIS;  Surgeon: Bo Capone PA-C;  Location: [facility] OR     THORACENTESIS Left 9/13/2022    Procedure: Thoracentesis;  Surgeon: Jose R Mccullough  MD Mason;  Location:  GI     THROMBECTOMY UPPER EXTREMITY Left 2022    Procedure: LEFT RADIAL ARM THROMBECTOMY;  Surgeon: Christie Graham MD;  Location: [facility] OR     TRANSPLANT LUNG RECIPIENT SINGLE X2 Bilateral 2022    Procedure: Clamshell Incision, Bilateral Sequential Lung Transplant, On Cardiopulmonary Bypass, Flexible Bronchoscopy;  Surgeon: Sue Sunshine MD;  [facility]           Family History:     No family history on file.         Social History:     Social History     Socioeconomic History     Marital status: Single     Spouse name: Not on file     Number of children: Not on file     Years of education: Not on file     Highest education level: Not on file   Occupational History     Not on file   Tobacco Use     Smoking status: Former     Years: 30.00     Types: Cigarettes     Quit date: 2020     Years since quittin.1     Smokeless tobacco: Never   Substance and Sexual Activity     Alcohol use: Not Currently     Drug use: Not Currently     Types: Marijuana, Methamphetamines     Comment: hx:marijuana and methamphetamine-quit both unsure ?  2-3 yrs ago     Sexual activity: Not on file   Other Topics Concern     Parent/sibling w/ CABG, MI or angioplasty before 65F 55M? Not Asked   Social History Narrative     Not on file     Social Determinants of Health     Financial Resource Strain: Not on file   Food Insecurity: Not on file   Transportation Needs: Not on file   Physical Activity: Not on file   Stress: Not on file   Social Connections: Not on file   Intimate Partner Violence: Not on file   Housing Stability: Not on file            Medications:     Current Outpatient Medications   Medication     alcohol swab prep pads     azaTHIOprine (IMURAN) 5 mg/mL SUSP     B and C vitamin Complex with folic acid (NEPHRONEX) 0.9 MG/5ML LIQD liquid     blood glucose (CONTOUR NEXT TEST) test strip     blood glucose (NO BRAND SPECIFIED) lancets standard     blood glucose monitoring  "(CONTOUR NEXT MONITOR W/DEVICE KIT) meter device kit     calcium carbonate 600 mg-vitamin D 400 units (CALTRATE) 600-400 MG-UNIT per tablet     cyanocobalamin (CYANOCOBALAMIN) 500 MCG tablet     dapsone 2 mg/mL SUSP     folic acid (FOLVITE) 1 MG tablet     Guar Gum (FIBER MODULAR, NUTRISOURCE FIBER,) packet     insulin aspart (NOVOLOG PEN) 100 UNIT/ML pen     insulin pen needle (32G X 4 MM) 32G X 4 MM miscellaneous     loperamide (IMODIUM A-D) 2 MG tablet     metoprolol tartrate (LOPRESSOR) 50 MG tablet     Nutritional Supplements (GLUCOSE MANAGEMENT) TABS     nystatin (MYCOSTATIN) 566838 UNIT/ML suspension     ondansetron (ZOFRAN ODT) 4 MG ODT tab     oseltamivir (TAMIFLU) 30 MG capsule     pantoprazole (PROTONIX) 2 mg/mL SUSP suspension     protein modular (PROSOURCE TF) LIQD     [place] Container MISC     tacrolimus (GENERIC EQUIVALENT) 1 mg/mL suspension     insulin pen needle (31G X 5 MM) 31G X 5 MM miscellaneous     predniSONE (DELTASONE) 5 MG tablet     No current facility-administered medications for this visit.            Physical Exam:   /70   Pulse 101   Resp 17   Ht 1.588 m (5' 2.5\")   Wt 64.3 kg (141 lb 12.8 oz)   SpO2 97%   BMI 25.52 kg/m      GENERAL: alert, NAD  HEENT: NCAT, EOMI, no scleral icterus, oral mucosa moist and without lesions  Neck: no cervical or supraclavicular adenopathy  Lungs: moderate airflow, decreased in bases with few fine crackles  CV: RRR, S1S2, + murmur  Abdomen: normoactive BS, soft, non tender; GJ tube in place  Lymph: no edema  Neuro: AAO X 3, CN 2-12 grossly intact  Psychiatric: normal affect, good eye contact  Skin: no rash, jaundice or lesions on limited exam         Data:   All laboratory and imaging data reviewed.      Recent Results (from the past 168 hour(s))   Tacrolimus by Tandem Mass Spectrometry    Collection Time: 12/16/22  7:57 AM   Result Value Ref Range    Tacrolimus by Tandem Mass Spectrometry 10.4 5.0 - 15.0 ug/L    Tacrolimus Last Dose Date " 12/15/2022     Tacrolimus Last Dose Time  8:00 PM    External Lab Results    Collection Time: 12/16/22  8:02 AM   Result Value Ref Range    Scan Lab Results (External) See Scanned Report (A)    CBC with Platelets & Differential    Collection Time: 12/16/22  8:02 AM   Result Value Ref Range    WBC Count (External) 4.2 Not provided 10(3)/uL    RBC Count (External) 2.46 Not provided 10(6)/uL    Hemoglobin (External) 9.0 Not provided g/dL    Hematocrit (External) 29.4 Not provided %    MCV (External) 119.5 Not provided fL    MCH (External) 36.6 Not provided pg    MCHC (External) 30.6 (L) 32.0 - 36.0 g/dL    RDW (External) 15.7 Not provided %    Platelet Count (External) 292 146 - 368 10(3)/uL    % Neutrophils (External) 64.0 49.0 - 73.0 %    % Bands (External) 2.0 0.0 - 6.0 %    % Lymphocytes (External) 12.0 (L) 17.0 - 38.0 %    % Monocytes (External) 10.0 2.0 - 13.0 %    % Eosinophils (External) 10.0 (H) 0.0 - 4.0 %    % Basophils (External) 2.0 0.0 - 2.0 %    Absolute Neutrophils (External) 2.7 1.8 - 9.2 10(3)/uL    Absolute Bands (External) 0.1 10(3)/uL    Absolute Lymphocytes (External) 0.5 (L) 1.2 - 3.9 10(3)/uL    Absolute Monocytes (External) 0.4 0.0 - 1.1 10(3)/uL    Absolute Eosinophils (External) 0.4 0.0 - 0.8 10(3)/uL    Absolute Basophils (External) 0.1 0.0 - 0.2 10(3)/uL    Method (External) Manual    Basic metabolic panel    Collection Time: 12/16/22  8:02 AM   Result Value Ref Range    Sodium (External) 138 136 - 146 mmol/L    Potassium (External) 3.9 3.5 - 5.1 mmol/L    Chloride (External) (External) 97 (L) 98 - 107 mmol/L    CO2 (External) 29 22 - 29 mmol/L    Anion Gap (External) 15.9 10.0 - 20.0 mmol/L    Creatinine (External) 4.18 mg/dL    Urea Nitrogen (External) 38.8 (H) 10.0 - 20.0 mg/dL    BUN/Creatinine Ratio (External) 9.28 (L) 11.70 - 22.90 ratio    Calcium (External) 9.7 8.6 - 10.5 mg/dL    Glucose (External) 113 (H) 70 - 100 mg/dL    GFR Estimated (External) 12 (L) >=60 ml/min/1.73m2    Magnesium    Collection Time: 12/16/22  8:02 AM   Result Value Ref Range    Magnesium (External) 2.4 1.8 - 2.6 mg/dL   COVID-19 Virus (Coronavirus) by PCR (External Result)    Collection Time: 12/16/22  8:02 AM   Result Value Ref Range    COVID-19 Virus by PCR (External Result) Undetected Undetected   COVID-19 VIRUS (CORONAVIRUS) BY PCR (EXTERNAL RESULT)    Collection Time: 12/16/22  8:02 AM   Result Value Ref Range    Specimen Source Oropharynx     COVID-19 Virus PCR to [place] - Result Undetected Undetected    SARS-CoV-2 PCR Comment SEE COMMENTS    CBC with platelets    Collection Time: 12/21/22 10:51 AM   Result Value Ref Range    WBC Count 3.4 (L) 4.0 - 11.0 10e3/uL    RBC Count 2.73 (L) 3.80 - 5.20 10e6/uL    Hemoglobin 9.9 (L) 11.7 - 15.7 g/dL    Hematocrit 31.8 (L) 35.0 - 47.0 %     (H) 78 - 100 fL    MCH 36.3 (H) 26.5 - 33.0 pg    MCHC 31.1 (L) 31.5 - 36.5 g/dL    RDW 15.5 (H) 10.0 - 15.0 %    Platelet Count 241 150 - 450 10e3/uL   Magnesium    Collection Time: 12/21/22 10:51 AM   Result Value Ref Range    Magnesium 2.6 (H) 1.7 - 2.3 mg/dL   Basic metabolic panel    Collection Time: 12/21/22 10:51 AM   Result Value Ref Range    Sodium 139 136 - 145 mmol/L    Potassium 4.2 3.4 - 5.3 mmol/L    Chloride 93 (L) 98 - 107 mmol/L    Carbon Dioxide (CO2) 33 (H) 22 - 29 mmol/L    Anion Gap 13 7 - 15 mmol/L    Urea Nitrogen 43.1 (H) 8.0 - 23.0 mg/dL    Creatinine 4.13 (H) 0.51 - 0.95 mg/dL    Calcium 9.7 8.8 - 10.2 mg/dL    Glucose 131 (H) 70 - 99 mg/dL    GFR Estimate 12 (L) >60 mL/min/1.73m2   General PFT Lab (Please always keep checked)    Collection Time: 12/21/22 10:58 AM   Result Value Ref Range    FVC-Pred 2.81 L    FVC-Pre 1.59 L    FVC-%Pred-Pre 56 %    FEV1-Pre 1.54 L    FEV1-%Pred-Pre 68 %    FEV1FVC-Pred 80 %    FEV1FVC-Pre 97 %    FEFMax-Pred 6.14 L/sec    FEFMax-Pre 5.16 L/sec    FEFMax-%Pred-Pre 84 %    FEF2575-Pred 2.11 L/sec    FEF2575-Pre 3.50 L/sec    TIC4080-%Pred-Pre 166 %    ExpTime-Pre  "5.99 sec    FIFMax-Pre 3.59 L/sec    FEV1FEV6-Pred 81 %    FEV1FEV6-Pre 96 %     PFT interpretation:  Maneuver: valid and met ATS guidelines  Mild obstruction with Z score -2.09  Compared to prior: FEV1 of 1.54 is 130 ml above prior      Transplant Coordinator Note    Reason for visit: Post lung transplant follow up visit   Coordinator: Present (digna- person)   Caregiver:  none    Health concerns addressed today:  1. ENT: at baseline  2. Respiratory: Feels her best today. Recovering from Flu, symptoms all improving. Denies congestion. Cough \"pretty much gone\" No shortness of breath. PFTs better.   3. GI: TF going okay. No nausea/vomiting recently. Stools are very running- doing imodium and fiber.   4. Mood:    5. Dialysis is going well. Feels a little more tired after dialysis days. Kidney   6. WBC down a little bit. Monitor for now.       Next Bronch due: Last bronch 11/9  Activity/rehab: Pulm rehab. Once a week. Started to work on the treadmill.  Oxygen needs: Only using oxygen at night, 1L.  Pain management/RX: tylenol and oxycodone as needed, incisional pain from time to time.   Diabetic management: on insulin- Novolog. Managed by Jose  Next Bronch due: Last bronch 11/9/22. Due 1/9/22.   Risk Criteria Labs: negative 7/28/22  CMV status: D+/R+  Valcyte stopped: POD 8-90  EBV status: D+/R+  AC/asa:  ASA discontinued after recent hospitalization   PJP prophylactic: Dapsone   Diet: Full liquid for pleasure. TF 12:00 noon-6AM (18 hours/day)  COVID:  1. COVID-19 infection (yes/no, date of most recent positive test):    2. Status/instructions given about COVID-19 vaccine: Vaccinated, booster x2 last 3/21/22. Received Evusheld 8/24/2022. Next due 2/24/2023. Received flu shot 10/12/22.     COVID:  1. COVID-19 infection (yes/no, date of most recent positive test):   2. Status/instructions given about COVID-19 vaccine:     Pt Education: medications (use/dose/side effects), how/when to call coordinator, frequency of " labs, s/s of infection/rejection, call prior to starting any new medications, lab/vital sign book    Health Maintenance:     Last colonoscopy:     Next colonoscopy due:     Dermatology:    Vaccinations this visit:     Labs, CXR, PFTs reviewed with patient  Medication record reviewed and reconciled  Questions and concerns addressed    Patient Instructions  1. Continue to hydrate with 60-70 oz fluids daily.  2. Continue to exercise daily or most days of the week.  3. Follow up with your primary care provider for annual gender health maintenance procedures.  4. Follow up with colonoscopy schedule.  5. Follow up with annual dermatology visits.  6. It doesn't seem like the COVID vaccine is working well in lung transplant patients. A number of lung transplant patients have gotten sick with COVID even after receiving the vaccines.  Based on our recent experience, it can be life-threatening to get COVID  even after being vaccinated. Please continue to act like you did not get the COVID vaccine - social distancing, wearing a mask, good hand hygiene, etc. If the people around you are vaccinated, it will help reduce the risk of you getting COVID. All members of your household should be vaccinated.  7. You can stop your Nystatin on 12/28/22.   8. On January 5th you can decrease your prednisone to 5mg in AM and 2.5mg in PM.       Next transplant clinic appointment: 1 month with CXR, labs and PFTs  Next lab draw: next Friday       AVS printed at time of check out      Again, thank you for allowing me to participate in the care of your patient.        Sincerely,        Kaylin Triana PA-C

## 2022-12-21 NOTE — PROGRESS NOTES
"Transplant Coordinator Note    Reason for visit: Post lung transplant follow up visit   Coordinator: Present (digna- person)   Caregiver:  none    Health concerns addressed today:  1. ENT: at baseline  2. Respiratory: Feels her best today. Recovering from Flu, symptoms all improving. Denies congestion. Cough \"pretty much gone\" No shortness of breath. PFTs better.   3. GI: TF going okay. No nausea/vomiting recently. Stools are very running- doing imodium and fiber.   4. Mood:    5. Dialysis is going well. Feels a little more tired after dialysis days. Kidney   6. WBC down a little bit. Monitor for now.       Next Bronch due: Last bronch 11/9  Activity/rehab: Pulm rehab. Once a week. Started to work on the treadmill.  Oxygen needs: Only using oxygen at night, 1L.  Pain management/RX: tylenol and oxycodone as needed, incisional pain from time to time.   Diabetic management: on insulin- Novolog. Managed by Jose  Next Bronch due: Last bronch 11/9/22. Due 1/9/22.   Risk Criteria Labs: negative 7/28/22  CMV status: D+/R+  Valcyte stopped: POD 8-90  EBV status: D+/R+  AC/asa:  ASA discontinued after recent hospitalization   PJP prophylactic: Dapsone   Diet: Full liquid for pleasure. TF 12:00 noon-6AM (18 hours/day)  COVID:  1. COVID-19 infection (yes/no, date of most recent positive test):    2. Status/instructions given about COVID-19 vaccine: Vaccinated, booster x2 last 3/21/22. Received Evusheld 8/24/2022. Next due 2/24/2023. Received flu shot 10/12/22.     COVID:  1. COVID-19 infection (yes/no, date of most recent positive test):   2. Status/instructions given about COVID-19 vaccine:     Pt Education: medications (use/dose/side effects), how/when to call coordinator, frequency of labs, s/s of infection/rejection, call prior to starting any new medications, lab/vital sign book    Health Maintenance:     Last colonoscopy:     Next colonoscopy due:     Dermatology:    Vaccinations this visit:     Labs, CXR, PFTs " reviewed with patient  Medication record reviewed and reconciled  Questions and concerns addressed    Patient Instructions  1. Continue to hydrate with 60-70 oz fluids daily.  2. Continue to exercise daily or most days of the week.  3. Follow up with your primary care provider for annual gender health maintenance procedures.  4. Follow up with colonoscopy schedule.  5. Follow up with annual dermatology visits.  6. It doesn't seem like the COVID vaccine is working well in lung transplant patients. A number of lung transplant patients have gotten sick with COVID even after receiving the vaccines.  Based on our recent experience, it can be life-threatening to get COVID  even after being vaccinated. Please continue to act like you did not get the COVID vaccine - social distancing, wearing a mask, good hand hygiene, etc. If the people around you are vaccinated, it will help reduce the risk of you getting COVID. All members of your household should be vaccinated.  7. You can stop your Nystatin on 12/28/22.   8. On January 5th you can decrease your prednisone to 5mg in AM and 2.5mg in PM.       Next transplant clinic appointment: 1 month with CXR, labs and PFTs  Next lab draw: next Friday       AVS printed at time of check out

## 2022-12-21 NOTE — PATIENT INSTRUCTIONS
Patient Instructions  1. Continue to hydrate with 60-70 oz fluids daily.  2. Continue to exercise daily or most days of the week.  3. Follow up with your primary care provider for annual gender health maintenance procedures.  4. Follow up with colonoscopy schedule.  5. Follow up with annual dermatology visits.  6. It doesn't seem like the COVID vaccine is working well in lung transplant patients. A number of lung transplant patients have gotten sick with COVID even after receiving the vaccines.  Based on our recent experience, it can be life-threatening to get COVID  even after being vaccinated. Please continue to act like you did not get the COVID vaccine - social distancing, wearing a mask, good hand hygiene, etc. If the people around you are vaccinated, it will help reduce the risk of you getting COVID. All members of your household should be vaccinated.  7. You can stop your Nystatin on 12/28/22.   8. On January 5th you can decrease your prednisone to 5mg in AM and 2.5mg in PM.       Next transplant clinic appointment: 1 month with CXR, labs and PFTs  Next lab draw: next Friday       AVS printed at time of check out            ~~~~~~~~~~~~~~~~~~~~~~~~~    Thoracic Transplant Office phone [phone], fax [phone]    Office Hours 8:30 - 5:00     For after-hours urgent issues, please dial [phone], and ask to speak with the Thoracic Transplant Coordinator On-Call.  --------------------  To expedite your medication refill(s), please contact your pharmacy and have them fax a refill request to: [phone]  .   *Please allow 3 business days for routine medication refills.  *Please allow 5 business days for controlled substance medication refills.    **For Diabetic medications and supplies refill(s), please contact your pharmacy and have them contact your Endocrine team.  --------------------  For scheduling appointments call [phone].  --------------------  Please Note: If you are active on [employer],  all future test results will be sent by [employer] message only, and will no longer be called to patient. You may also receive communication directly from your physician.

## 2022-12-22 LAB
CMV DNA SPEC NAA+PROBE-ACNC: <137 IU/ML
CMV DNA SPEC NAA+PROBE-LOG#: <2.1 {LOG_COPIES}/ML
EBV DNA COPIES/ML, INSTRUMENT: 7105 COPIES/ML
EBV DNA SPEC NAA+PROBE-LOG#: 3.9 {LOG_COPIES}/ML

## 2022-12-29 DIAGNOSIS — Z94.2 LUNG REPLACED BY TRANSPLANT (H): Primary | ICD-10-CM

## 2022-12-29 NOTE — TELEPHONE ENCOUNTER
Pt is requesting new rx for    Prednisone 5mg tabs    Did not see on active med list please verify and send new rx    [facility]  [phone]

## 2022-12-30 ENCOUNTER — LAB (OUTPATIENT)
Dept: LAB | Facility: CLINIC | Age: 62
End: 2022-12-30
Payer: MEDICARE

## 2022-12-30 DIAGNOSIS — N18.6 ESRD (END STAGE RENAL DISEASE) (H): ICD-10-CM

## 2022-12-30 DIAGNOSIS — D80.1 HYPOGAMMAGLOBULINEMIA (H): ICD-10-CM

## 2022-12-30 DIAGNOSIS — Z94.2 S/P LUNG TRANSPLANT (H): ICD-10-CM

## 2022-12-30 PROCEDURE — 80197 ASSAY OF TACROLIMUS: CPT | Performed by: PHYSICIAN ASSISTANT

## 2022-12-30 PROCEDURE — 86832 HLA CLASS I HIGH DEFIN QUAL: CPT | Performed by: PHYSICIAN ASSISTANT

## 2022-12-30 PROCEDURE — 86833 HLA CLASS II HIGH DEFIN QUAL: CPT | Performed by: PHYSICIAN ASSISTANT

## 2023-01-02 ENCOUNTER — HOSPITAL ENCOUNTER (OUTPATIENT)
Dept: NUCLEAR MEDICINE | Facility: CLINIC | Age: 63
Setting detail: NUCLEAR MEDICINE
Discharge: HOME | End: 2023-01-02
Attending: PHYSICIAN ASSISTANT | Admitting: PHYSICIAN ASSISTANT
Payer: MEDICARE

## 2023-01-02 DIAGNOSIS — Z94.2 S/P LUNG TRANSPLANT (H): ICD-10-CM

## 2023-01-02 DIAGNOSIS — Z94.2 S/P LUNG TRANSPLANT (H): Primary | ICD-10-CM

## 2023-01-02 DIAGNOSIS — Z94.2 LUNG REPLACED BY TRANSPLANT (H): ICD-10-CM

## 2023-01-02 DIAGNOSIS — K31.84 GASTROPARESIS: ICD-10-CM

## 2023-01-02 LAB
DONOR IDENTIFICATION: NORMAL
DQB2: NORMAL
DSA COMMENTS: NORMAL
DSA PRESENT: YES
DSA TEST METHOD: NORMAL
ORGAN: NORMAL
SA 1 CELL: NORMAL
SA 1 TEST METHOD: NORMAL
SA 2 CELL: NORMAL
SA 2 TEST METHOD: NORMAL
SA1 HI RISK ABY: NORMAL
SA1 MOD RISK ABY: NORMAL
SA2 HI RISK ABY: NORMAL
SA2 MOD RISK ABY: NORMAL
UNACCEPTABLE ANTIGENS: NORMAL
UNOS CPRA: 37
ZZZSA 1  COMMENTS: NORMAL
ZZZSA 2 COMMENTS: NORMAL

## 2023-01-02 PROCEDURE — 78264 GASTRIC EMPTYING IMG STUDY: CPT | Mod: 26 | Performed by: RADIOLOGY

## 2023-01-02 PROCEDURE — 343N000001 HC RX 343: Performed by: PHYSICIAN ASSISTANT

## 2023-01-02 PROCEDURE — A9541 TC99M SULFUR COLLOID: HCPCS | Performed by: PHYSICIAN ASSISTANT

## 2023-01-02 PROCEDURE — 78264 GASTRIC EMPTYING IMG STUDY: CPT

## 2023-01-02 RX ORDER — PREDNISONE 5 MG/1
TABLET ORAL
Qty: 135 TABLET | Refills: 3 | Status: SHIPPED | OUTPATIENT
Start: 2023-01-02 | End: 2023-06-13

## 2023-01-02 RX ADMIN — Medication 2 MILLICURIE: at 08:50

## 2023-01-02 NOTE — TELEPHONE ENCOUNTER
Medication/Refill approved per CPA:    [facility] COLLABORATIVE AGREEMENT FOR  IMMUNOSUPPRESSENT PRESCRIPTION MODIFICATION.      Routing encounter to Transplant as an FYI.    Thanks,  Dolly Stone, PharmD  Specialty Pharmacist/Transplant  [facility]  [phone]

## 2023-01-03 DIAGNOSIS — Z94.2 S/P LUNG TRANSPLANT (H): Primary | Chronic | ICD-10-CM

## 2023-01-03 DIAGNOSIS — N18.6 ESRD (END STAGE RENAL DISEASE) ON DIALYSIS (H): ICD-10-CM

## 2023-01-03 DIAGNOSIS — Z99.2 ESRD (END STAGE RENAL DISEASE) ON DIALYSIS (H): ICD-10-CM

## 2023-01-04 ENCOUNTER — DOCUMENTATION ONLY (OUTPATIENT)
Dept: GASTROENTEROLOGY | Facility: CLINIC | Age: 63
End: 2023-01-04

## 2023-01-04 LAB
ACID FAST STAIN (ARUP): NORMAL
ACID FAST STAIN (ARUP): NORMAL

## 2023-01-04 NOTE — PROGRESS NOTES
Called PT and Appt Confirmed.    Called to remind patient of their upcoming appointment with our GI clinic, on 01/11/23 at 7:40 AM with Dr. Navarro. This appointment is scheduled as a video visit. You will receive a call approximately 30 minutes prior to check you in, you must be in [place] for this visit., if your appointment is virtual (video or telephone) you need to be in [place] for the visit. To reschedule or cancel patient to call [phone].    SK

## 2023-01-05 NOTE — TELEPHONE ENCOUNTER
RECORDS STATUS - ALL OTHER DIAGNOSIS      RECORDS RECEIVED FROM: [facility], [facility]   DATE RECEIVED: 1/6/23   NOTES STATUS DETAILS   OFFICE NOTE from referring provider Epic Dr. Claribel Franks   OFFICE NOTE from other specialist Epic 12/21/22: Kaylin Triana PA-C   DISCHARGE SUMMARY from [facility], EPIC 11/30/22, 01/11/20, 02/25/19: [facility]    11/09/22, 09/09/22, 06/28/22, 06/30/21: [facility]   DISCHARGE REPORT from the ER [facility] 08/19/22: [facility] ED   OPERATIVE REPORT [facility] 11/09/22: BRONCHOSCOPY, WITH BRONCHOALVEOLAR LAVAGE AND BIOPSY    10/07/22: ENDOSCOPIC RETROGRADE CHOLANGIOPANCREATOGRAPHY     09/13/22: INSPECTION BRONCHOSCOPY, WITH BRONCHOALVEOLAR LAVAGE    08/11/22: ENDOSCOPIC RETROGRADE CHOLANGIOPANCREATOGRAPHY WITH PANCREATIC DUCT NEEDLE KNIFE AND STENT PLACEMENT    (more in Epic)   MEDICATION LIST [facility]    LABS     PATHOLOGY REPORTS Reports in EPIC 06/28/22: [id number]   ANYTHING RELATED TO DIAGNOSIS Epic Most recent 12/30/22   IMAGING (NEED IMAGES & REPORT)     CT SCANS PACS [facility]:  05/29/19, 02/13/18: CT Chest  03/03/16: CT Chest Abd Pel    PACS:  11/11/22-06/14/21: CT Chest  07/14/21: CT Chest Abd Pel   XRAYS PACS [facility]:  12/16/22-07/09/15: XR Chest    PACS:  12/21/22-06/14/21: XR Chest

## 2023-01-06 ENCOUNTER — APPOINTMENT (OUTPATIENT)
Dept: LAB | Facility: CLINIC | Age: 63
End: 2023-01-06
Payer: MEDICARE

## 2023-01-06 ENCOUNTER — REFERRAL (OUTPATIENT)
Dept: TRANSPLANT | Facility: CLINIC | Age: 63
End: 2023-01-06

## 2023-01-06 DIAGNOSIS — E09.65 DRUG OR CHEMICAL INDUCED DIABETES MELLITUS WITH HYPERGLYCEMIA (H): ICD-10-CM

## 2023-01-06 DIAGNOSIS — D63.1 ANEMIA OF CHRONIC RENAL FAILURE, STAGE 3B (H): ICD-10-CM

## 2023-01-06 DIAGNOSIS — Z01.818 PRE-TRANSPLANT EVALUATION FOR KIDNEY TRANSPLANT: ICD-10-CM

## 2023-01-06 DIAGNOSIS — D84.9 IMMUNOSUPPRESSED STATUS (H): Chronic | ICD-10-CM

## 2023-01-06 DIAGNOSIS — Z94.2 S/P LUNG TRANSPLANT (H): Primary | Chronic | ICD-10-CM

## 2023-01-06 DIAGNOSIS — Z76.82 ORGAN TRANSPLANT CANDIDATE: ICD-10-CM

## 2023-01-06 DIAGNOSIS — N18.32 ANEMIA OF CHRONIC RENAL FAILURE, STAGE 3B (H): ICD-10-CM

## 2023-01-06 DIAGNOSIS — N18.6 END STAGE RENAL DISEASE (H): ICD-10-CM

## 2023-01-06 LAB
TACROLIMUS BLD-MCNC: 8 UG/L (ref 5–15)
TME LAST DOSE: NORMAL H
TME LAST DOSE: NORMAL H

## 2023-01-06 PROCEDURE — 80197 ASSAY OF TACROLIMUS: CPT | Mod: 93 | Performed by: PHYSICIAN ASSISTANT

## 2023-01-06 NOTE — TELEPHONE ENCOUNTER
Recent Lung Txp recipient followed at [facility]    PCP: Andres Fairbanks (nephrology)    Referring Provider: Claribel Franks  Referring Diagnosis: Chronic ESRD on dilysis    GFR/Date: 11    Is patient under the age of 65? yes  Is patient diabetic? Steroid inducted  Is patient on insulin? Yes, sliding scale PRN   Was patient offered a pancreas transplant referral? no    Is patient in a group home/assisted living? Lives with daughter   Does patient have a guardian? no    Referral intake process completed.  Patient is aware that after financial approval is received, medical records will be requested.   Patient confirmed for a callback from transplant coordinator on 1/20/23. (within 2 weeks)  Tentative evaluation date 1/30/23  (within 4 weeks) if appointment is virtual, does patient have capabilities of setting this up? yes    Confirmed coordinator will discuss evaluation process in more detail at the time of their call.   Patient is aware of the need to arrange age appropriate cancer screening, vaccinations, and dental care.  Reminded patient to complete questionnaire, complete medical records release, and review packet prior to evaluation visit .  Assessed patient for special needs (ie-wheelchair, assistance, guardian, and ):  none   Patient instructed to call [phone] with questions.     Patient gave verbal consent during intake call to obtain medical records and documents outside of [facility]:  yes

## 2023-01-09 NOTE — RESULT ENCOUNTER NOTE
Tacrolimus level 8.0 at 12 hours, on 1/6/23.  Goal 8-12.   Current dose 3 mg in AM, 3 mg in PM    No change in dose  [employer] message sent

## 2023-01-11 ENCOUNTER — PATIENT OUTREACH (OUTPATIENT)
Dept: GASTROENTEROLOGY | Facility: CLINIC | Age: 63
End: 2023-01-11

## 2023-01-11 ENCOUNTER — PRE VISIT (OUTPATIENT)
Dept: GASTROENTEROLOGY | Facility: CLINIC | Age: 63
End: 2023-01-11

## 2023-01-11 ENCOUNTER — VIRTUAL VISIT (OUTPATIENT)
Dept: GASTROENTEROLOGY | Facility: CLINIC | Age: 63
End: 2023-01-11
Attending: INTERNAL MEDICINE
Payer: MEDICARE

## 2023-01-11 DIAGNOSIS — Z94.2 LUNG REPLACED BY TRANSPLANT (H): ICD-10-CM

## 2023-01-11 DIAGNOSIS — K31.84 GASTROPARESIS: Primary | ICD-10-CM

## 2023-01-11 DIAGNOSIS — K25.3 ACUTE PYLORUS ULCER: ICD-10-CM

## 2023-01-11 PROCEDURE — 99214 OFFICE O/P EST MOD 30 MIN: CPT | Mod: 95 | Performed by: INTERNAL MEDICINE

## 2023-01-11 NOTE — PROGRESS NOTES
Called patient as follow up to clinic this morning. Discussed liquid diet needed 1-2 days prior to procedure, pt is on tube feeding and knows to stop at midnight prior to procedure.  Pt asked about medications on morning of, per her pre op exam notes, should hold all medications. But she is on immunosuppression so we discussed talking to her transplant coordinator for guidance on whether holding those is advised.  Pt would also like to know if the EGD (1/23)  and Bronch (1/25) could be done together. Message routed to pulmonary and Dr Navarro with this question.    Penny Vogel, RN, BSN,   [facility]  Care coordinator       14

## 2023-01-11 NOTE — LETTER
1/11/2023         RE: Sofie Rodriguez  [address] [place] [address]        Dear Colleague,    Thank you for referring your patient, Sofie Rodriguez, to the [facility]. Please see a copy of my visit note below.    Sofie is a 62 year old who is being evaluated via a billable video visit.      How would you like to obtain your AVS? MyChart  If the video visit is dropped, the invitation should be resent by: Text to cell phone: [phone]  Will anyone else be joining your video visit? No        Video-Visit Details    Type of service:  Video Visit   Video Start Time: 7:43 AM  Video End Time:8:01 AM    Originating Location (pt. 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 Diff, h/o EBV viremia, ESRD on dialysis, hemobilia s/p ERCP presumably due to hemorrhage into gallbladder, referred for gastroparesis s/p PEGJ placement to discuss endoscopic options. I reviewed her endoscopies while she was hospitalized and her gastric emptying studies. Slightly complicating the diagnosis of true post-surgical/vagal injury gastroparesis is the identification of a pyloric channel ulcer which creates gastric outlet obstruction through a separate mechanism. 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 I explained that a subset of patients will have spontaneous improvement over the course of a year following their surgery, but if gastroparesis persists beyond this then it is unlikely to resolve. Encouragingly, her gastric emptying  study noted improvement in emptying from January 2023 compared to September 2022.     I explained the endoscopic options for trying to address her gastroparesis. For a more long term solution, we can consider G-POEM although this may not technically be possible due to the pyloric channel ulcer causing scarring down in this region. Not everyone responds as the pathophsiology of gastroparesis in her may not be predominately pylorospasm in nature. Additionally, this is more invasive and require admission for observation afterwards. I recommended a trial of intrapyloric botox injection as a response to this is a predictor of responding to G-POEM. This will also allow us to assess the degree to which her pyloric channel ulcer caused a stricture for which we could intervene on separately or even determine if G-POEM is even feasible. I explained that botox if successful on average lasts 3-6 months, but by then her natural history may show resolution of her vagal injury induced gastroparesis and no further intervention will be needed. If endoscopic intervention isn't effective, then I would recommend starting Reglan. I will also check to see if it's possible to consolidate her tube feeds further as 18 hours a day is a long time.      RECOMMENDATIONS:  - Proceed with EGD with botox and assess pyloric channel ulcer  - Will reach out to nutrition/pulmonary to see if we can consolidate tube feed time      Gonzalez Navarro MD  [facility]  Division of Gastroenterology and Hepatology  [facility] [address] [place], [place] [address]    ________________________________________________________________  HPI:  Sofie is a 62 year old female with a PMHx of end stage COPD s/p bilateral lung transplant on 6/28/22 complicated by acute limb ischemia of LUE s/p left radial thrombectomy, h/o C. Diff, h/o EBV viremia, ESRD on dialysis, hemobilia s/p ERCP presumably due to hemorrhage into gallbladder,  referred for gastroparesis s/p PEGJ placement to discuss endoscopic options. She essentially developed gastroparesis following her lung transplant and underwent PEGJ placement by IR on 7/27/22. But also of note is development of a pyloric channel ulcer seen on an EGD on 8/11/22. She's been on BID PPI since then. Most recent gastric emptying study on 1/2/2023 shows persistent delayed emptying of 75% at 240 min. She continues on J-tube feeds but does eat sometimes for comfort. Eating can make her symptomatic with nausea, bloating/gas. She will periodically vent her G-tube about once a week, sometimes more frequently if she's been eating, when she has symptoms of bloating/gas and distension which helps. She has never been on Reglan. She still is on 18 hour J-tube feeds.     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     Hepatitis 2017    Hep C, [facility]     HTN (hypertension)      Osteopenia      Patient Active Problem List   Diagnosis     COPD (chronic obstructive pulmonary disease) (H)     Abnormal findings on diagnostic imaging of cardiovascular system     Status post coronary angiogram     Hepatitis C, chronic (H)     S/P lung transplant (H)     Acute post-operative pain     Immunosuppressed status (H)     Acute kidney failure with tubular necrosis (H)     Thrombus of left radial artery (H)     Diaphragm dysfunction     Paroxysmal A-fib (H)     Administration of long-term prophylactic antibiotics     EBV (Vibha-Barr virus) viremia     Acute pylorus ulcer     Hypogammaglobulinemia (H)     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)     Anemia     Gastrojejunostomy tube status (H)     Stage 3b chronic kidney disease (H)     Anemia of chronic renal failure, stage 3b (H)     Transplant rejection     Hemoptysis      Pulmonary edema       PSurgHx:  Past Surgical History:   Procedure Laterality Date     BRONCHOSCOPY (RIGID OR FLEXIBLE), DIAGNOSTIC N/A 8/2/2022    Procedure: BRONCHOSCOPY, DIAGNOSTIC- inspection Bronch;  Surgeon: Kamala Lovell MD;  Location:  GI     BRONCHOSCOPY (RIGID OR FLEXIBLE), DIAGNOSTIC N/A 9/13/2022    Procedure: INSPECTION BRONCHOSCOPY, WITH BRONCHOALVEOLAR LAVAGE;  Surgeon: Jose R Mccullough MD;  Location:  GI     BRONCHOSCOPY (RIGID OR FLEXIBLE), DIAGNOSTIC N/A 11/9/2022    Procedure: BRONCHOSCOPY, WITH BRONCHOALVEOLAR LAVAGE AND BIOPSY;  Surgeon: Cesar Lima MD;  Location:  GI     BRONCHOSCOPY FLEXIBLE AND RIGID N/A 07/19/2022    Procedure: BRONCHOSCOPY inspection only;  Surgeon: Bob Liao MD;  Location:  GI     COLONOSCOPY  2015     CV CORONARY ANGIOGRAM N/A 06/30/2021    Procedure: CV CORONARY ANGIOGRAM;  Surgeon: Alexander Cuellar MD;  Location:  HEART CARDIAC CATH LAB     CV RIGHT HEART CATH MEASUREMENTS RECORDED N/A 06/30/2021    Procedure: CV RIGHT HEART CATH;  Surgeon: Alexander Cuellar MD;  Location:  HEART CARDIAC CATH LAB     ENDOSCOPIC RETROGRADE CHOLANGIOPANCREATOGRAM N/A 8/11/2022    Procedure: ENDOSCOPIC RETROGRADE CHOLANGIOPANCREATOGRAPHY WITH PANCREATIC DUCT NEEDLE KNIFE AND STENT PLACEMENT, BILE DUCT SPHINCTEROTOMY, BLOOD/DEBRIS REMOVAL AND STENT PLACEMENT;  Surgeon: Cosmo Arroyo MD;  Location:  OR     ENDOSCOPIC RETROGRADE CHOLANGIOPANCREATOGRAM N/A 10/7/2022    Procedure: ENDOSCOPIC RETROGRADE CHOLANGIOPANCREATOGRAPHY with biliary and pancreatic stent removal, debris removal;  Surgeon: Cosmo Arroyo MD;  Location:  OR     ENT SURGERY  1974    tonsillectomy     ENTEROSCOPY SMALL BOWEL N/A 8/11/2022    Procedure: SMALL BOWEL ENTEROSCOPY;  Surgeon: Cosmo Arroyo MD;  Location:  OR     ESOPHAGOGASTRODUODENOSCOPY, WITH NASOGASTRIC TUBE INSERTION N/A 07/01/2022    Procedure: ESOPHAGOGASTRODUODENOSCOPY, WITH NASOJEJUNAL TUBE  INSERTION;  Surgeon: Ozzy Nickerson MD;  Location: [facility] GI     ESOPHAGOSCOPY, GASTROSCOPY, DUODENOSCOPY (EGD), COMBINED N/A 8/3/2022    Procedure: ESOPHAGOGASTRODUODENOSCOPY (EGD);  Surgeon: Ira Andres MD;  Location: [facility] GI     HAND SURGERY       INSERT CHEST TUBE Right 9/13/2022    Procedure: Insert chest tube;  Surgeon: Jose R Mccullough MD;  Location: [facility]     IR CVC TUNNEL PLACEMENT > 5 YRS OF AGE  9/26/2022     IR GASTRO JEJUNOSTOMY TUBE CHANGE  8/31/2022     IR GASTRO JEJUNOSTOMY TUBE CHANGE  12/21/2022     IR GASTRO JEJUNOSTOMY TUBE PLACEMENT  7/27/2022     IR THORACENTESIS  8/29/2022     LEEP TX, CERVICAL  04/07/2017    HECTOR III     LYMPH NODE BIOPSY Left 2005    Left axilla, benign- [place]     MIDLINE INSERTION - DOUBLE LUMEN Left 07/28/2022    20cm, Basilic vein     REPLACE GASTROJEJUNOSTOMY TUBE, PERCUTANEOUS  10/7/2022    Procedure: Replace Gastrojejunostomy Tube;  Surgeon: Cosmo Arroyo MD;  [facility]     THORACENTESIS Left 8/29/2022    Procedure: THORACENTESIS;  Surgeon: Bo Capone PA-C;  Location: [facility] OR     THORACENTESIS Left 9/13/2022    Procedure: Thoracentesis;  Surgeon: Jose R Mccullough MD;  Location: [facility]     THROMBECTOMY UPPER EXTREMITY Left 07/02/2022    Procedure: LEFT RADIAL ARM THROMBECTOMY;  Surgeon: Christie Graham MD;  [facility]     TRANSPLANT LUNG RECIPIENT SINGLE X2 Bilateral 06/28/2022    Procedure: Clamshell Incision, Bilateral Sequential Lung Transplant, On Cardiopulmonary Bypass, Flexible Bronchoscopy;  Surgeon: Sue Sunshine MD;  Location: [facility] OR       MEDS:  Current Outpatient Medications   Medication     alcohol swab prep pads     azaTHIOprine (IMURAN) 5 mg/mL SUSP     B and C vitamin Complex with folic acid (NEPHRONEX) 0.9 MG/5ML LIQD liquid     blood glucose (CONTOUR NEXT TEST) test strip     blood glucose (NO BRAND SPECIFIED) lancets standard     blood glucose monitoring (CONTOUR NEXT MONITOR W/DEVICE  KIT) meter device kit     calcium carbonate 600 mg-vitamin D 400 units (CALTRATE) 600-400 MG-UNIT per tablet     cyanocobalamin (CYANOCOBALAMIN) 500 MCG tablet     dapsone 2 mg/mL SUSP     folic acid (FOLVITE) 1 MG tablet     Guar Gum (FIBER MODULAR, NUTRISOURCE FIBER,) packet     insulin aspart (NOVOLOG PEN) 100 UNIT/ML pen     insulin pen needle (31G X 5 MM) 31G X 5 MM miscellaneous     insulin pen needle (32G X 4 MM) 32G X 4 MM miscellaneous     loperamide (IMODIUM A-D) 2 MG tablet     metoprolol tartrate (LOPRESSOR) 50 MG tablet     Nutritional Supplements (GLUCOSE MANAGEMENT) TABS     nystatin (MYCOSTATIN) 785340 UNIT/ML suspension     ondansetron (ZOFRAN ODT) 4 MG ODT tab     oseltamivir (TAMIFLU) 30 MG capsule     pantoprazole (PROTONIX) 2 mg/mL SUSP suspension     predniSONE (DELTASONE) 5 MG tablet     protein modular (PROSOURCE TF) LIQD     [employer]arps Container MISC     tacrolimus (GENERIC EQUIVALENT) 1 mg/mL suspension     No current facility-administered medications for this visit.     ALLERGIES:    Allergies   Allergen Reactions     Blood Transfusion Related (Informational Only) Other (See Comments)     Patient has a history of a clinically significant antibody against RBC antigens.  A delay in compatible RBCs may occur.      FHx: Noncontributory    SOCIAL Hx:  Social History     Socioeconomic History     Marital status: Single     Spouse name: Not on file     Number of children: Not on file     Years of education: Not on file     Highest education level: Not on file   Occupational History     Not on file   Tobacco Use     Smoking status: Former     Years: 30.00     Types: Cigarettes     Quit date: 2020     Years since quittin.1     Smokeless tobacco: Never   Substance and Sexual Activity     Alcohol use: Not Currently     Drug use: Not Currently     Types: Marijuana, Methamphetamines     Comment: hx:marijuana and methamphetamine-quit both unsure ?  2-3 yrs ago     Sexual activity: Not on file    Other Topics Concern     Parent/sibling w/ CABG, MI or angioplasty before 65F 55M? Not Asked   Social History Narrative     Not on file     Social Determinants of Health     Financial Resource Strain: Not on file   Food Insecurity: Not on file   Transportation Needs: Not on file   Physical Activity: Not on file   Stress: Not on file   Social Connections: Not on file   Intimate Partner Violence: Not on file   Housing Stability: Not on file       ROS: A comprehensive Review of Systems was asked and answered in the negative unless specifically commented upon in the HPI    Physical Exam  There were no vitals taken for this visit.  There is no height or weight on file to calculate BMI.  Gen: A&Ox3, NAD  HEENT: Moist mucus membranes, no scleral icterus.  Lungs: no respiratory distress      LABS:  External Order Results on 12/30/2022   Component Date Value Ref Range Status     Sodium (External) 12/30/2022 137  136 - 146 mmol/L Final     Potassium (External) 12/30/2022 4.1  3.5 - 5.1 mmol/L Final     Chloride (External) (External) 12/30/2022 97 (L)  98 - 107 mmol/L Final     CO2 (External) 12/30/2022 27  22 - 29 mmol/L Final     Anion Gap (External) 12/30/2022 17.1  10.0 - 20.0 mmol/L Final     Creatinine (External) 12/30/2022 4.34  mg/dL Final     Urea Nitrogen (External) 12/30/2022 43.6 (H)  10.0 - 20.0 mg/dL Final     BUN/Creatinine Ratio (External) 12/30/2022 10.05 (L)  11.70 - 22.90 ratio Final     Calcium (External) 12/30/2022 9.5  8.6 - 10.5 mg/dL Final     Glucose (External) 12/30/2022 102 (H)  70 - 100 mg/dL Final     GFR Estimated (External) 12/30/2022 11 (L)  >=60 ml/min/1.73m2 Final     Magnesium (External) 12/30/2022 2.5  1.8 - 2.6 mg/dL Final     WBC Count (External) 12/30/2022 4.7  10(3)/uL Final     RBC Count (External) 12/30/2022 2.81  10(6)/uL Final     Hemoglobin (External) 12/30/2022 10.3  g/dL Final     Hematocrit (External) 12/30/2022 31.1  % Final     MCV (External) 12/30/2022 110.7  fL Final      MCH (External) 12/30/2022 36.5  pg Final     MCHC (External) 12/30/2022 33.0  32.0 - 36.0 g/dL Final     RDW (External) 12/30/2022 16.2  % Final     Platelet Count (External) 12/30/2022 251  179 - 450 10(3)/uL Final         IMAGING:  EXAM:  NM GASTRIC EMPTYING, 1/2/2023 1:21 PM  HISTORY: Gastroparesis; Lung replaced by transplant (H)     TECHNIQUE: The patient ingested 2.1 mCi of Tc-99m sulfur colloid in 2  eggs, 2 pieces of toast w/ jelly. Static images were acquired at  approximately 1 hour intervals out through 4 hours using a dual head  gamma camera in an anterior and posterior position. The calculation of  emptying was based on dual head imaging with geometric mean  calculations.  A Linear square fit was calculated to the emptying  curve to determine the amount of residual activity at each time point.     FINDINGS:   Percent retention at 60 min = 87%.  Percent retention at 120 min = 77%.  Percent retention at 180 min = 77%.  Percent retention at 240 min = 75%.     T 1/2 emptying time =  238 mins.                                                                      IMPRESSION: Delayed gastric emptying    EXAM:  NM GASTRIC EMPTYING, 9/30/2022 12:32 PM     HISTORY: severe delayed gastric emptying for follow up; is off  dialysis. NPO weds night     TECHNIQUE: The patient ingested 2.3 mCi of Tc-99m sulfur colloid in in  2 eggs, 1 slices of toast with jelly, and a glass of water. Static  images were acquired at approximately 1 hour intervals out through 4  hours using a dual head gamma camera in an anterior and posterior  position. The calculation of emptying was based on dual head imaging  with geometric mean calculations.  A Linear square fit was calculated  to the emptying curve to determine the amount of residual activity at  each time point.     FINDINGS:   Percent retention at 60 min = 97%.  Percent retention at 120 min = 95%.  Percent retention at 180 min = 91%.  Percent retention at 240 min = 91%.     T 1/2  emptying time =  Too long to calculate.                                                                      IMPRESSION: Severe delayed gastric emptying    Endoscopies:  Upper GI Endoscopy 08/03/2022 11:49 AM [facility]   [address] [place] [address] [phone]     Endoscopy Department   _______________________________________________________________________________   Patient Name: Sofie Rodriguez            Procedure Date: 8/3/2022 11:49 AM   MRN: [id number]                       Account Number: [id number]   YOB: 1960               Admit Type: Inpatient   Age: 62                               Room: [place] [address]   Gender: Female                        Note Status: Finalized   Attending MD: GREGORIO CARSON MD Pause for the Cause: pause for    cause done   Total Sedation Time: 16 minutes         _______________________________________________________________________________       Procedure:             Upper GI endoscopy   Indications:           Coffee-ground emesis   Providers:             GREGORIO CARSON MD, Josefina CANALES RN, Lashonda Murrell RN   Patient Profile:       Ms. Jimenez is a 62 year old female with end stage COPD                          s/p bilateral lung transplant (6/28/22) complicated by                          acute limb ischemia of LUE requiring left radial                          thrombectomy, EBV viremia and C.diff (vancomycin                          7/12/22-7/28/22). She was found to have delayed                          gastric emptying on GES and underwent percutaneous GJ                          tube placement by IR on 7/27/22. GI consulted for                          development of dark G tube output concerning for upper                          GI bleed.   Referring MD:             Medicines:             Midazolam 3 mg IV, Fentanyl 100 micrograms IV    Complications:         No immediate complications.   _______________________________________________________________________________   Procedure:             Pre-Anesthesia Assessment:                          - Prior to the procedure, a History and Physical was                          performed, and patient medications and allergies were                          reviewed. The patient is competent. The risks and                          benefits of the procedure and the sedation options and                          risks were discussed with the patient. All questions                          were answered and informed consent was obtained.                          Patient identification and proposed procedure were                          verified by the physician and the nurse in the                          procedure room. Mental Status Examination: normal.                          Airway Examination: normal oropharyngeal airway and                          neck mobility and Mallampati Class III (part of the                          uvula and soft palate visualized). Respiratory                          Examination: clear to auscultation. CV Examination:                          normal. Prophylactic Antibiotics: The patient does not                          require prophylactic antibiotics. Prior                          Anticoagulants: The patient has taken no anticoagulant                          or antiplatelet agents. ASA Grade Assessment: III - A                          patient with severe systemic disease. After reviewing                          the risks and benefits, the patient was deemed in                          satisfactory condition to undergo the procedure. The                          anesthesia plan was to use moderate sedation /                          analgesia (conscious sedation). Immediately prior to                          administration of medications, the patient was                           re-assessed for adequacy to receive sedatives. The                          heart rate, respiratory rate, oxygen saturations,                          blood pressure, adequacy of pulmonary ventilation, and                          response to care were monitored throughout the                          procedure. The physical status of the patient was                          re-assessed after the procedure.                          After obtaining informed consent, the endoscope was                          passed under direct vision. Throughout the procedure,                          the patient's blood pressure, pulse, and oxygen                          saturations were monitored continuously. The Endoscope                          was introduced through the mouth, and advanced to the                          second part of duodenum. The upper GI endoscopy was                          accomplished without difficulty. The patient tolerated                          the procedure well.                                                                                     Findings:        The examined esophagus was normal.        The Z-line was regular and was found 40 cm from the incisors.        Excessive fluid and solid food pieces were found in the gastric fundus        and body. This could not be cleared.        One non-bleeding superficial ulcer at the pyloric channel with no        stigmata of bleeding was found at the pylorus. The lesion was 7 mm in        largest dimension. There was inflammation (edema, erythema) surrounding        the ulcer.        There was evidence of an intact gastrostomy present on the anterior wall        of the stomach with J tube extension traversing the pylorus. The area        around gastric feeding tube was inspected with mild irritation and        erythema under the balloon (not unexpected with recent feeding tube        placement) but no ulcer or stigmata of bleeding.         Given the presence of feeding tube extending through the pylorus as well        as ulcer-related inflammation leading to some pyloric channel narrowing,        it did take some additional pressure to traverse the pylous, but the        adult endoscope was able to pass.        The duodenal bulb, first portion of the duodenum and second portion of        the duodenum were normal. J tube visualized in duodenum.                                                                                     Moderate Sedation:        Moderate (conscious) sedation was administered by the endoscopy nurse        and supervised by the endoscopist. The patient's oxygen saturation,        heart rate, blood pressure and response to care were monitored. Total        physician intraservice time was 16 minutes.   Impression:            Clean-based ulcer at pyloric channel is suspected                          source of bleeding. See above Findings for further                          details.                          - Normal esophagus.                          - Z-line regular, 40 cm from the incisors.                          - Excessive gastric fluid and residual food.                          - Non-bleeding ulcer with surrounding inflammation at                          the pyloric channel with no stigmata of bleeding.                          - Intact gastrostomy with J-extension passing through                          pylorus.                          - Some increased pressure required to pass adult                          endoscope through pylorus in setting of inflammation                          and presence of J-tube.                          - Normal duodenal bulb, first portion of the duodenum                          and second portion of the duodenum.                          - No specimens collected.   Recommendation:        - Return patient to [facility] for ongoing care.                          - Strict NPO to  allow time for stomach to empty given                          large residual gastric contents. Would avoid                          prokinetics at this time given concern for pyloric                          channel narrowing 2/2 inflammation.                          - Vent G tube to allow drainage of fluid/gas.                          - Continue pantoprazole 40 mg IV BID                          - Okay to resume anticoagulation tomorrow.                          - Repeat upper endoscopy in 8 weeks to check healing                          of ulcer. Pending clinical picture and endoscopic                          findings may need to consider pyloric dilation if                          stricture develops from current inflammation.                          - Disucssed findings with patient                          - Discussed findings and recommendations with primary                          medication team as well as pulmonary team taking care                          of patient.                                                                                     Ira Andres MD        Again, thank you for allowing me to participate in the care of your patient.      Sincerely,    Gonzalez Navarro MD

## 2023-01-11 NOTE — PROGRESS NOTES
Sofie is a 62 year old who is being evaluated via a billable video visit.      How would you like to obtain your AVS? MyChart  If the video visit is dropped, the invitation should be resent by: Text to cell phone: [phone]  Will anyone else be joining your video visit? No        Video-Visit Details    Type of service:  Video Visit   Video Start Time: 7:43 AM  Video End Time:8:01 AM    Originating Location (pt. 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 Diff, h/o EBV viremia, ESRD on dialysis, hemobilia s/p ERCP presumably due to hemorrhage into gallbladder, referred for gastroparesis s/p PEGJ placement to discuss endoscopic options. I reviewed her endoscopies while she was hospitalized and her gastric emptying studies. Slightly complicating the diagnosis of true post-surgical/vagal injury gastroparesis is the identification of a pyloric channel ulcer which creates gastric outlet obstruction through a separate mechanism. 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 I explained that a subset of patients will have spontaneous improvement over the course of a year following their surgery, but if gastroparesis persists beyond this then it is unlikely to resolve. Encouragingly, her gastric emptying study noted improvement in emptying from January 2023 compared to September 2022.     I explained the endoscopic options for trying to address her gastroparesis. For a more long term solution, we can consider G-POEM although this may not technically be  possible due to the pyloric channel ulcer causing scarring down in this region. Not everyone responds as the pathophsiology of gastroparesis in her may not be predominately pylorospasm in nature. Additionally, this is more invasive and require admission for observation afterwards. I recommended a trial of intrapyloric botox injection as a response to this is a predictor of responding to G-POEM. This will also allow us to assess the degree to which her pyloric channel ulcer caused a stricture for which we could intervene on separately or even determine if G-POEM is even feasible. I explained that botox if successful on average lasts 3-6 months, but by then her natural history may show resolution of her vagal injury induced gastroparesis and no further intervention will be needed. If endoscopic intervention isn't effective, then I would recommend starting Reglan. I will also check to see if it's possible to consolidate her tube feeds further as 18 hours a day is a long time.      RECOMMENDATIONS:  - Proceed with EGD with botox and assess pyloric channel ulcer  - Will reach out to nutrition/pulmonary to see if we can consolidate tube feed time      Gonzalez Navarro MD  [facility]  Division of Gastroenterology and Hepatology  [facility] [address]  [address] [address]    ________________________________________________________________  HPI:  Sofie is a 62 year old female with a PMHx of end stage COPD s/p bilateral lung transplant on 6/28/22 complicated by acute limb ischemia of LUE s/p left radial thrombectomy, h/o C. Diff, h/o EBV viremia, ESRD on dialysis, hemobilia s/p ERCP presumably due to hemorrhage into gallbladder, referred for gastroparesis s/p PEGJ placement to discuss endoscopic options. She essentially developed gastroparesis following her lung transplant and underwent PEGJ placement by IR on 7/27/22. But also of note is development of a pyloric channel ulcer seen  on an EGD on 8/11/22. She's been on BID PPI since then. Most recent gastric emptying study on 1/2/2023 shows persistent delayed emptying of 75% at 240 min. She continues on J-tube feeds but does eat sometimes for comfort. Eating can make her symptomatic with nausea, bloating/gas. She will periodically vent her G-tube about once a week, sometimes more frequently if she's been eating, when she has symptoms of bloating/gas and distension which helps. She has never been on Reglan. She still is on 18 hour J-tube feeds.     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     Hepatitis 2017    Hep C, [facility]     HTN (hypertension)      Osteopenia      Patient Active Problem List   Diagnosis     COPD (chronic obstructive pulmonary disease) (H)     Abnormal findings on diagnostic imaging of cardiovascular system     Status post coronary angiogram     Hepatitis C, chronic (H)     S/P lung transplant (H)     Acute post-operative pain     Immunosuppressed status (H)     Acute kidney failure with tubular necrosis (H)     Thrombus of left radial artery (H)     Diaphragm dysfunction     Paroxysmal A-fib (H)     Administration of long-term prophylactic antibiotics     EBV (Vibha-Barr virus) viremia     Acute pylorus ulcer     Hypogammaglobulinemia (H)     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)     Anemia     Gastrojejunostomy tube status (H)     Stage 3b chronic kidney disease (H)     Anemia of chronic renal failure, stage 3b (H)     Transplant rejection     Hemoptysis     Pulmonary edema       PSurgHx:  Past Surgical History:   Procedure Laterality Date     BRONCHOSCOPY (RIGID OR FLEXIBLE), DIAGNOSTIC N/A 8/2/2022    Procedure: BRONCHOSCOPY, DIAGNOSTIC- inspection Bronch;  Surgeon: Kamala Lovell MD;  Location:  GI      BRONCHOSCOPY (RIGID OR FLEXIBLE), DIAGNOSTIC N/A 9/13/2022    Procedure: INSPECTION BRONCHOSCOPY, WITH BRONCHOALVEOLAR LAVAGE;  Surgeon: Jose R Mccullough MD;  Location:  GI     BRONCHOSCOPY (RIGID OR FLEXIBLE), DIAGNOSTIC N/A 11/9/2022    Procedure: BRONCHOSCOPY, WITH BRONCHOALVEOLAR LAVAGE AND BIOPSY;  Surgeon: Cesar Lima MD;  Location:  GI     BRONCHOSCOPY FLEXIBLE AND RIGID N/A 07/19/2022    Procedure: BRONCHOSCOPY inspection only;  Surgeon: Bob Liao MD;  Location:  GI     COLONOSCOPY  2015     CV CORONARY ANGIOGRAM N/A 06/30/2021    Procedure: CV CORONARY ANGIOGRAM;  Surgeon: Alexander Cuellar MD;  Location:  HEART CARDIAC CATH LAB     CV RIGHT HEART CATH MEASUREMENTS RECORDED N/A 06/30/2021    Procedure: CV RIGHT HEART CATH;  Surgeon: Alexander Cuellar MD;  Location:  HEART CARDIAC CATH LAB     ENDOSCOPIC RETROGRADE CHOLANGIOPANCREATOGRAM N/A 8/11/2022    Procedure: ENDOSCOPIC RETROGRADE CHOLANGIOPANCREATOGRAPHY WITH PANCREATIC DUCT NEEDLE KNIFE AND STENT PLACEMENT, BILE DUCT SPHINCTEROTOMY, BLOOD/DEBRIS REMOVAL AND STENT PLACEMENT;  Surgeon: Cosmo Arroyo MD;  Location:  OR     ENDOSCOPIC RETROGRADE CHOLANGIOPANCREATOGRAM N/A 10/7/2022    Procedure: ENDOSCOPIC RETROGRADE CHOLANGIOPANCREATOGRAPHY with biliary and pancreatic stent removal, debris removal;  Surgeon: Cosmo Arroyo MD;  Location:  OR     ENT SURGERY  1974    tonsillectomy     ENTEROSCOPY SMALL BOWEL N/A 8/11/2022    Procedure: SMALL BOWEL ENTEROSCOPY;  Surgeon: Cosmo Arroyo MD;  Location:  OR     ESOPHAGOGASTRODUODENOSCOPY, WITH NASOGASTRIC TUBE INSERTION N/A 07/01/2022    Procedure: ESOPHAGOGASTRODUODENOSCOPY, WITH NASOJEJUNAL TUBE INSERTION;  Surgeon: Ozzy Nickerson MD;  Location:  GI     ESOPHAGOSCOPY, GASTROSCOPY, DUODENOSCOPY (EGD), COMBINED N/A 8/3/2022    Procedure: ESOPHAGOGASTRODUODENOSCOPY (EGD);  Surgeon: Ira Andres MD;  Location:  GI     HAND  SURGERY       INSERT CHEST TUBE Right 9/13/2022    Procedure: Insert chest tube;  Surgeon: Jose R Mccullough MD;  Location: [facility] GI     IR CVC TUNNEL PLACEMENT > 5 YRS OF AGE  9/26/2022     IR GASTRO JEJUNOSTOMY TUBE CHANGE  8/31/2022     IR GASTRO JEJUNOSTOMY TUBE CHANGE  12/21/2022     IR GASTRO JEJUNOSTOMY TUBE PLACEMENT  7/27/2022     IR THORACENTESIS  8/29/2022     LEEP TX, CERVICAL  04/07/2017    HECTOR III     LYMPH NODE BIOPSY Left 2005    Left axilla, benign- [place]     MIDLINE INSERTION - DOUBLE LUMEN Left 07/28/2022    20cm, Basilic vein     REPLACE GASTROJEJUNOSTOMY TUBE, PERCUTANEOUS  10/7/2022    Procedure: Replace Gastrojejunostomy Tube;  Surgeon: Cosmo Arroyo MD;  Location: [facility]     THORACENTESIS Left 8/29/2022    Procedure: THORACENTESIS;  Surgeon: Bo Capone PA-C;  Location: [facility] OR     THORACENTESIS Left 9/13/2022    Procedure: Thoracentesis;  Surgeon: Jose R Mccullough MD;  Location: [facility]     THROMBECTOMY UPPER EXTREMITY Left 07/02/2022    Procedure: LEFT RADIAL ARM THROMBECTOMY;  Surgeon: Christie Graham MD;  Location: [facility]     TRANSPLANT LUNG RECIPIENT SINGLE X2 Bilateral 06/28/2022    Procedure: Clamshell Incision, Bilateral Sequential Lung Transplant, On Cardiopulmonary Bypass, Flexible Bronchoscopy;  Surgeon: Sue Sunshine MD;  Location: [facility] OR       MEDS:  Current Outpatient Medications   Medication     alcohol swab prep pads     azaTHIOprine (IMURAN) 5 mg/mL SUSP     B and C vitamin Complex with folic acid (NEPHRONEX) 0.9 MG/5ML LIQD liquid     blood glucose (CONTOUR NEXT TEST) test strip     blood glucose (NO BRAND SPECIFIED) lancets standard     blood glucose monitoring (CONTOUR NEXT MONITOR W/DEVICE KIT) meter device kit     calcium carbonate 600 mg-vitamin D 400 units (CALTRATE) 600-400 MG-UNIT per tablet     cyanocobalamin (CYANOCOBALAMIN) 500 MCG tablet     dapsone 2 mg/mL SUSP     folic acid (FOLVITE) 1 MG tablet     Guar Gum (FIBER MODULAR,  NUTRISOURCE FIBER,) packet     insulin aspart (NOVOLOG PEN) 100 UNIT/ML pen     insulin pen needle (31G X 5 MM) 31G X 5 MM miscellaneous     insulin pen needle (32G X 4 MM) 32G X 4 MM miscellaneous     loperamide (IMODIUM A-D) 2 MG tablet     metoprolol tartrate (LOPRESSOR) 50 MG tablet     Nutritional Supplements (GLUCOSE MANAGEMENT) TABS     nystatin (MYCOSTATIN) 667378 UNIT/ML suspension     ondansetron (ZOFRAN ODT) 4 MG ODT tab     oseltamivir (TAMIFLU) 30 MG capsule     pantoprazole (PROTONIX) 2 mg/mL SUSP suspension     predniSONE (DELTASONE) 5 MG tablet     protein modular (PROSOURCE TF) LIQD     [employer] Container MISC     tacrolimus (GENERIC EQUIVALENT) 1 mg/mL suspension     No current facility-administered medications for this visit.     ALLERGIES:    Allergies   Allergen Reactions     Blood Transfusion Related (Informational Only) Other (See Comments)     Patient has a history of a clinically significant antibody against RBC antigens.  A delay in compatible RBCs may occur.      FHx: Noncontributory    SOCIAL Hx:  Social History     Socioeconomic History     Marital status: Single     Spouse name: Not on file     Number of children: Not on file     Years of education: Not on file     Highest education level: Not on file   Occupational History     Not on file   Tobacco Use     Smoking status: Former     Years: 30.00     Types: Cigarettes     Quit date: 2020     Years since quittin.1     Smokeless tobacco: Never   Substance and Sexual Activity     Alcohol use: Not Currently     Drug use: Not Currently     Types: Marijuana, Methamphetamines     Comment: hx:marijuana and methamphetamine-quit both unsure ?  2-3 yrs ago     Sexual activity: Not on file   Other Topics Concern     Parent/sibling w/ CABG, MI or angioplasty before 65F 55M? Not Asked   Social History Narrative     Not on file     Social Determinants of Health     Financial Resource Strain: Not on file   Food Insecurity: Not on file    Transportation Needs: Not on file   Physical Activity: Not on file   Stress: Not on file   Social Connections: Not on file   Intimate Partner Violence: Not on file   Housing Stability: Not on file       ROS: A comprehensive Review of Systems was asked and answered in the negative unless specifically commented upon in the HPI    Physical Exam  There were no vitals taken for this visit.  There is no height or weight on file to calculate BMI.  Gen: A&Ox3, NAD  HEENT: Moist mucus membranes, no scleral icterus.  Lungs: no respiratory distress      LABS:  External Order Results on 12/30/2022   Component Date Value Ref Range Status     Sodium (External) 12/30/2022 137  136 - 146 mmol/L Final     Potassium (External) 12/30/2022 4.1  3.5 - 5.1 mmol/L Final     Chloride (External) (External) 12/30/2022 97 (L)  98 - 107 mmol/L Final     CO2 (External) 12/30/2022 27  22 - 29 mmol/L Final     Anion Gap (External) 12/30/2022 17.1  10.0 - 20.0 mmol/L Final     Creatinine (External) 12/30/2022 4.34  mg/dL Final     Urea Nitrogen (External) 12/30/2022 43.6 (H)  10.0 - 20.0 mg/dL Final     BUN/Creatinine Ratio (External) 12/30/2022 10.05 (L)  11.70 - 22.90 ratio Final     Calcium (External) 12/30/2022 9.5  8.6 - 10.5 mg/dL Final     Glucose (External) 12/30/2022 102 (H)  70 - 100 mg/dL Final     GFR Estimated (External) 12/30/2022 11 (L)  >=60 ml/min/1.73m2 Final     Magnesium (External) 12/30/2022 2.5  1.8 - 2.6 mg/dL Final     WBC Count (External) 12/30/2022 4.7  10(3)/uL Final     RBC Count (External) 12/30/2022 2.81  10(6)/uL Final     Hemoglobin (External) 12/30/2022 10.3  g/dL Final     Hematocrit (External) 12/30/2022 31.1  % Final     MCV (External) 12/30/2022 110.7  fL Final     MCH (External) 12/30/2022 36.5  pg Final     MCHC (External) 12/30/2022 33.0  32.0 - 36.0 g/dL Final     RDW (External) 12/30/2022 16.2  % Final     Platelet Count (External) 12/30/2022 251  179 - 450 10(3)/uL Final         IMAGING:  EXAM:  NM  GASTRIC EMPTYING, 1/2/2023 1:21 PM  HISTORY: Gastroparesis; Lung replaced by transplant (H)     TECHNIQUE: The patient ingested 2.1 mCi of Tc-99m sulfur colloid in 2  eggs, 2 pieces of toast w/ jelly. Static images were acquired at  approximately 1 hour intervals out through 4 hours using a dual head  gamma camera in an anterior and posterior position. The calculation of  emptying was based on dual head imaging with geometric mean  calculations.  A Linear square fit was calculated to the emptying  curve to determine the amount of residual activity at each time point.     FINDINGS:   Percent retention at 60 min = 87%.  Percent retention at 120 min = 77%.  Percent retention at 180 min = 77%.  Percent retention at 240 min = 75%.     T 1/2 emptying time =  238 mins.                                                                      IMPRESSION: Delayed gastric emptying    EXAM:  NM GASTRIC EMPTYING, 9/30/2022 12:32 PM     HISTORY: severe delayed gastric emptying for follow up; is off  dialysis. NPO weds night     TECHNIQUE: The patient ingested 2.3 mCi of Tc-99m sulfur colloid in in  2 eggs, 1 slices of toast with jelly, and a glass of water. Static  images were acquired at approximately 1 hour intervals out through 4  hours using a dual head gamma camera in an anterior and posterior  position. The calculation of emptying was based on dual head imaging  with geometric mean calculations.  A Linear square fit was calculated  to the emptying curve to determine the amount of residual activity at  each time point.     FINDINGS:   Percent retention at 60 min = 97%.  Percent retention at 120 min = 95%.  Percent retention at 180 min = 91%.  Percent retention at 240 min = 91%.     T 1/2 emptying time =  Too long to calculate.                                                                      IMPRESSION: Severe delayed gastric emptying    Endoscopies:  Upper GI Endoscopy 08/03/2022 11:49 AM [facility]  [address] [place] [address] [phone]     Endoscopy Department   _______________________________________________________________________________   Patient Name: Sofie Rodriguez            Procedure Date: 8/3/2022 11:49 AM   MRN: [id number]                       Account Number: [id number]   YOB: 1960               Admit Type: Inpatient   Age: 62                               Room: [address] [address]   Gender: Female                        Note Status: Finalized   Attending MD: GREGORIO CARSON MD Pause for the Cause: pause for    cause done   Total Sedation Time: 16 minutes         _______________________________________________________________________________       Procedure:             Upper GI endoscopy   Indications:           Coffee-ground emesis   Providers:             GREGORIO CARSON MD, Josefina CANALES, RIYA, Lashonda Murrell RN   Patient Profile:       Ms. Jimenez is a 62 year old female with end stage COPD                          s/p bilateral lung transplant (6/28/22) complicated by                          acute limb ischemia of LUE requiring left radial                          thrombectomy, EBV viremia and C.diff (vancomycin                          7/12/22-7/28/22). She was found to have delayed                          gastric emptying on GES and underwent percutaneous GJ                          tube placement by IR on 7/27/22. GI consulted for                          development of dark G tube output concerning for upper                          GI bleed.   Referring MD:             Medicines:             Midazolam 3 mg IV, Fentanyl 100 micrograms IV   Complications:         No immediate complications.   _______________________________________________________________________________   Procedure:             Pre-Anesthesia Assessment:                          - Prior to the procedure, a History and  Physical was                          performed, and patient medications and allergies were                          reviewed. The patient is competent. The risks and                          benefits of the procedure and the sedation options and                          risks were discussed with the patient. All questions                          were answered and informed consent was obtained.                          Patient identification and proposed procedure were                          verified by the physician and the nurse in the                          procedure room. Mental Status Examination: normal.                          Airway Examination: normal oropharyngeal airway and                          neck mobility and Mallampati Class III (part of the                          uvula and soft palate visualized). Respiratory                          Examination: clear to auscultation. CV Examination:                          normal. Prophylactic Antibiotics: The patient does not                          require prophylactic antibiotics. Prior                          Anticoagulants: The patient has taken no anticoagulant                          or antiplatelet agents. ASA Grade Assessment: III - A                          patient with severe systemic disease. After reviewing                          the risks and benefits, the patient was deemed in                          satisfactory condition to undergo the procedure. The                          anesthesia plan was to use moderate sedation /                          analgesia (conscious sedation). Immediately prior to                          administration of medications, the patient was                          re-assessed for adequacy to receive sedatives. The                          heart rate, respiratory rate, oxygen saturations,                          blood pressure, adequacy of pulmonary ventilation, and                          response  to care were monitored throughout the                          procedure. The physical status of the patient was                          re-assessed after the procedure.                          After obtaining informed consent, the endoscope was                          passed under direct vision. Throughout the procedure,                          the patient's blood pressure, pulse, and oxygen                          saturations were monitored continuously. The Endoscope                          was introduced through the mouth, and advanced to the                          second part of duodenum. The upper GI endoscopy was                          accomplished without difficulty. The patient tolerated                          the procedure well.                                                                                     Findings:        The examined esophagus was normal.        The Z-line was regular and was found 40 cm from the incisors.        Excessive fluid and solid food pieces were found in the gastric fundus        and body. This could not be cleared.        One non-bleeding superficial ulcer at the pyloric channel with no        stigmata of bleeding was found at the pylorus. The lesion was 7 mm in        largest dimension. There was inflammation (edema, erythema) surrounding        the ulcer.        There was evidence of an intact gastrostomy present on the anterior wall        of the stomach with J tube extension traversing the pylorus. The area        around gastric feeding tube was inspected with mild irritation and        erythema under the balloon (not unexpected with recent feeding tube        placement) but no ulcer or stigmata of bleeding.        Given the presence of feeding tube extending through the pylorus as well        as ulcer-related inflammation leading to some pyloric channel narrowing,        it did take some additional pressure to traverse the pylous, but the        adult  endoscope was able to pass.        The duodenal bulb, first portion of the duodenum and second portion of        the duodenum were normal. J tube visualized in duodenum.                                                                                     Moderate Sedation:        Moderate (conscious) sedation was administered by the endoscopy nurse        and supervised by the endoscopist. The patient's oxygen saturation,        heart rate, blood pressure and response to care were monitored. Total        physician intraservice time was 16 minutes.   Impression:            Clean-based ulcer at pyloric channel is suspected                          source of bleeding. See above Findings for further                          details.                          - Normal esophagus.                          - Z-line regular, 40 cm from the incisors.                          - Excessive gastric fluid and residual food.                          - Non-bleeding ulcer with surrounding inflammation at                          the pyloric channel with no stigmata of bleeding.                          - Intact gastrostomy with J-extension passing through                          pylorus.                          - Some increased pressure required to pass adult                          endoscope through pylorus in setting of inflammation                          and presence of J-tube.                          - Normal duodenal bulb, first portion of the duodenum                          and second portion of the duodenum.                          - No specimens collected.   Recommendation:        - Return patient to [facility] for ongoing care.                          - Strict NPO to allow time for stomach to empty given                          large residual gastric contents. Would avoid                          prokinetics at this time given concern for pyloric                          channel narrowing 2/2 inflammation.                           - Vent G tube to allow drainage of fluid/gas.                          - Continue pantoprazole 40 mg IV BID                          - Okay to resume anticoagulation tomorrow.                          - Repeat upper endoscopy in 8 weeks to check healing                          of ulcer. Pending clinical picture and endoscopic                          findings may need to consider pyloric dilation if                          stricture develops from current inflammation.                          - Disucssed findings with patient                          - Discussed findings and recommendations with primary                          medication team as well as pulmonary team taking care                          of patient.                                                                                       Ira Anrdes MD

## 2023-01-12 ENCOUNTER — PREP FOR PROCEDURE (OUTPATIENT)
Dept: MULTI SPECIALTY CLINIC | Facility: CLINIC | Age: 63
End: 2023-01-12

## 2023-01-12 DIAGNOSIS — Z94.2 LUNG REPLACED BY TRANSPLANT (H): Primary | ICD-10-CM

## 2023-01-12 NOTE — PROGRESS NOTES
Called pt to discuss that procedure can be combined with Dr Reddy's on 1/25 in [facility]. Message sent to Endo scheduling and changed accordingly. Procedure in OR on 1/23 can be cancelled. Pt in agreement.

## 2023-01-12 NOTE — LETTER
PHYSICIAN ORDERS      DATE & TIME ISSUED: 2023 8:23 AM  PATIENT NAME: Sofie Rodriguez   : 1960     [facility] MR# [if applicable]: [id number]     DIAGNOSIS:  Lung Transplant  Z94.2  Please call patient and schedule overnight oximetry study on room air.        Any questions please call: [phone]    Please fax these results to [phone].        Kaylin Triana PA-C

## 2023-01-12 NOTE — PROGRESS NOTES
"  Brief CVTS Progress Note  07/31/2022     Sofie Rodriguez is a 62 year old female with PMH significant for oxygen-dependent COPD, HF, HTN, HCV, osteopenia, and methamphetamine abuse in remission.  She is now s/p BSLT by Dr. Sunshine on 6/28/2022.  Her post-operative course has been complicated by LUE critical limb ischemia now s/p left radial thrombectomy on 7/1/2022, hypercapneic respiratory insufficiency, BACILIO, and dysphagia.     A/P:  S/p BLST on 6/28/2022, post op course c/b LUE critical limb ischemia now s/p left radial thrombectomy on 7/1/2022, hypercapneic respiratory insufficiency, BACILIO, and dysphagia.    -Hgb<7 7/30, responded appropriately s/p one unit pRBCs     - Chest tube output past 24h from the right 260 ml, 140cc from the left, but the left apical tube may be helping keep the left lung up and the right tube has been draining some. She is not tolerating diuresis either. So I will leave both tubes today  - Agree with continued diuresis as tolerated and monitoring pleural effusion.   - IR-placed left apical pigtail chest tube on 7/25; management per Pulmonology  - Per surgeon, lungs did not fill chest space  - Daily wound care per nursing:  Wound cleanser to clamshell incision, pat dry, may be left open to air; keep incision C/D/I  - Remainder of plan per Pulm Transplant/Medicine teams     Discussed with Dr. Sunshine as needed    Azar Martell PA-C  Cardiothoracic Surgery  p: [phone]       Interval History:   No acute complaints          Physical Exam:     Vitals: /77 (BP Location: Right arm, Cuff Size: Adult Regular)   Pulse 102   Temp 98.8  F (37.1  C) (Oral)   Resp 18   Ht 1.575 m (5' 2\")   Wt 73.5 kg (162 lb)   SpO2 100%   BMI 29.63 kg/m    BMI= Body mass index is 29.63 kg/m .    Gen: A&Ox4, NAD  Neuro: no focal deficits   CV: NSR on tele.   Pulm: Breathing regular and non-labored            Data:    Imaging:  CXR reviewed  Recent Results (from the past 24 hour(s))   XR Chest 2 Views    " Narrative    EXAM: XR CHEST 2 VIEWS 7/31/2022 9:11 AM    HISTORY: interval follow up, lung transplant, bilateral chest tubes.    COMPARISON: Previous day.    TECHNIQUE: Upright frontal and lateral views of the chest.    FINDINGS: Left apical chest tube in place. Right basilar chest tube in  place. Left-sided CVC with tip in the subclavian  vein. Postsurgical  changes of the chest with contrast sternotomy wires intact.  Trachea is midline. Cardiomediastinal silhouette is stable. No new  pulmonary opacities. Stable streaky perihilar pulmonary opacities.  Right hydropneumothorax with locule of air in the minor fissure.  Stable left pleural effusion. Left hydropneumothorax with  air along  the left lower zone loculated collection      Impression    IMPRESSION:     1. Loculated right hydropneumothorax with a locule of air in the minor  fissure. Right basilar chest tube in stable position.  2. Loculated left hydropneumothorax with  air along the left lower  zone loculated collection. Stable position of left apical chest tube.  3. Stable bilateral atelectasis.    I have personally reviewed the examination and initial interpretation  and I agree with the findings.    NIKOS JAVED MD         SYSTEM ID:  [id number]     [facility]   Lab 08/01/22  0832 08/01/22  0820 08/01/22  0410 08/01/22  0009 07/31/22  0942 07/31/22  0622 07/30/22  0838 07/30/22  0350 07/29/22  0818 07/29/22  0446     --   --   --   --  138  --  142  --  145   POTASSIUM 4.2  --   --   --   --  4.5  --  4.5  --  4.5   CHLORIDE 91*  --   --   --   --  89*  --  91*  --  91*   ESTUARDO 9.0  --   --   --   --  8.9  --  9.1  --  9.2   CO2 47*  --   --   --   --  46*  --  49*  --  48*   BUN 80.2*  --   --   --   --  88.3*  --  84.2*  --  86.8*   CR 1.67*  --   --   --   --  1.86*  --  1.61*  --  1.50*   * 161* 181* 134*   < > 152*   < > 185*   < > 165*    < > = values in this interval not displayed.     CBC  Recent Labs   Lab 07/31/22  0622  07/30/22  0350 07/29/22  0446 07/28/22  0925   WBC 6.3 5.9 4.9 3.9*   RBC 2.61* 2.08* 2.19* 2.70*   HGB 8.0* 6.7* 7.0* 8.7*   HCT 26.7* 22.4* 23.8* 29.6*   * 108* 109* 110*   MCH 30.7 32.2 32.0 32.2   MCHC 30.0* 29.9* 29.4* 29.4*   RDW 19.1* 16.3* 16.3* 16.9*    244 265 240     INR  Recent Labs   Lab 07/27/22  0646   INR 0.87      Hepatic Panel   Recent Labs   Lab 08/01/22  0832 07/28/22  0925   AST 22 17   ALT 12 12   ALKPHOS 68 60   BILITOTAL 0.2 0.2   ALBUMIN 2.8* 3.0*     Glucose  Recent Labs   Lab 08/01/22  0832 08/01/22  0820 08/01/22  0410 08/01/22  0009 07/31/22  2022 07/31/22  1651   * 161* 181* 134* 138* 166*        Universal Safety Interventions

## 2023-01-13 ENCOUNTER — VIRTUAL VISIT (OUTPATIENT)
Dept: PHARMACY | Facility: CLINIC | Age: 63
End: 2023-01-13
Payer: MEDICARE

## 2023-01-13 ENCOUNTER — LAB (OUTPATIENT)
Dept: LAB | Facility: CLINIC | Age: 63
End: 2023-01-13
Payer: MEDICARE

## 2023-01-13 DIAGNOSIS — Z94.2 LUNG REPLACED BY TRANSPLANT (H): Primary | ICD-10-CM

## 2023-01-13 DIAGNOSIS — T38.0X5A STEROID-INDUCED HYPERGLYCEMIA: ICD-10-CM

## 2023-01-13 DIAGNOSIS — R73.9 STEROID-INDUCED HYPERGLYCEMIA: ICD-10-CM

## 2023-01-13 PROCEDURE — 80197 ASSAY OF TACROLIMUS: CPT | Performed by: INTERNAL MEDICINE

## 2023-01-13 NOTE — Clinical Note
MTM no longer covered for diabetes management. Spoke with patient about this. She is holding her novolog insulin as she is only using 1-2 units a day.

## 2023-01-13 NOTE — PROGRESS NOTES
Medication Therapy Management (MTM) Encounter    ASSESSMENT:                            Medication Adherence/Access: No issues identified    Lung Transplant:  Stable.     Steroid induced hyperglycemia: Blood sugars nearly all , even during tube feediing s from 2pm to 8am. Only once BG in the past week over 180 during the tube feedings. Patient may take a trial off insulin as she is only use 1-2 units daily. .    PLAN:                            Hold insulin, let me know if blood sugars are consistently going over 180.   Follow-up with primary care for diabetes management. MTM no longer covered.    Follow-up: as needed    SUBJECTIVE/OBJECTIVE:                          Sofie Rodriguez is a 62 year old female called for a follow-up visit.  Today's visit is a follow-up MTM visit from 1/17     Reason for visit: follow-up post transplant. Transitional visit as patient no logner has coverage for MTM management of diabetes. .    Allergies/ADRs: Reviewed in chart  Past Medical History: Reviewed in chart  Tobacco: She reports that she quit smoking about 2 years ago. Her smoking use included cigarettes. She has never used smokeless tobacco.  Alcohol: not currently using    Medication Adherence/Access: no issues reported    Lung Transplant:  Current immunosuppressants include Azathioprine 20mL (100mg) daily, Prednisone 7.5mg twice daily, and Tacrolimus 3mg (3mL) twice daily.  Pt reports diarrhea.   Using oxygen at night only.   Vascular prophylaxis: Aspirin 81mg daily, denies GIB sx.   Transplant date: 6/28/22  PJP prophylaxis: Dapsone 50mg three times per week, Bactrim likely stopped due to potassium.   Thrush prophylaxis:completed  PPI use: Pantoprazole 40mg (20mL) twice daily. Denies GERD sx.   Supplements: Nephrovite liquid daily, Calcium/D twice daily, Vitamin B12 500mcg daily, Folic acid 1mg daily.  Tx Coordinator: Girma Sharpe, Using Med Card: Yes  Immunizations: annual flu shot 2022; [id number]:  2013, 2021;  "Prevnar 13/15/20: 2015; TDaP:  2015; Shingrix: 2021x2, HBV: immunity per last titer, COVID: Pfizer-BioNTWebshoz x4    Steroid induced hyperglycemia:  Currently taking Novolog sliding scale ever four hours (140-164 1 units, 165-189 2 units etc). Patient is not experiencing side effects.  Blood sugar monitoring: every 4 hours. Ranges (patient reported):   Symptoms of low blood sugar? None    Symptoms of high blood sugar? none  Noon to 6 am tube feeding.  Diet/Exercise: Tube feeding 18 hours, 12pm-6am, eating \"pleasure bites\" of solid food. Food can cause pain.   Date 8 am 12pm 4pm 8pm   1/13 142      1/12 151 119 129 141   1/11 128 163 128 129   1/10 107 106 156 174   1/9 142 138 122 138   1/8 155 125  122   1/7 98 125 151 186     Today's Vitals: There were no vitals taken for this visit.  ----------------      I spent 17 minutes with this patient today. All changes were made via collaborative practice agreement with Dr. Franks. A copy of the visit note was provided to the patient's provider(s).    A summary of these recommendations was sent via [employer].    Yung Rivera PharmD  [facility] Pharmacist    Phone: [phone]     Telemedicine Visit Details  Type of service:  Telephone visit  Start Time: 11:16 AM  End Time: 11:33 AM     Medication Therapy Recommendations  Steroid-induced hyperglycemia    Current Medication: insulin aspart (NOVOLOG PEN) 100 UNIT/ML pen (Discontinued)   Rationale: No medical indication at this time - Unnecessary medication therapy - Indication   Recommendation: Discontinue Medication   Status: Accepted per CPA                  "

## 2023-01-14 LAB
TACROLIMUS BLD-MCNC: 8.8 UG/L (ref 5–15)
TME LAST DOSE: NORMAL H
TME LAST DOSE: NORMAL H

## 2023-01-14 NOTE — PATIENT INSTRUCTIONS
"Recommendations from today's MTM visit:                                                       Hold insulin, let me know if blood sugars are consistently going over 180.   Follow-up with primary care for diabetes management. MTM no longer covered.    Follow-up: as needed    It was great speaking with you today.  I value your experience and would be very thankful for your time in providing feedback in our clinic survey. In the next few days, you may receive an email or text message from [employer] with a link to a survey related to your  clinical pharmacist.\"     To schedule another MTM appointment, please call the clinic directly or you may call the MTM scheduling line at [phone] or toll-free at [phone].     My Clinical Pharmacist's contact information:                                                      Please feel free to contact me with any questions or concerns you have.      Yung Rivera, PharmD  MTM Pharmacist    Phone: [phone]     "

## 2023-01-16 NOTE — RESULT ENCOUNTER NOTE
Tacrolimus level 8.8 at 12 hours, on 1/13/23.  Goal 8-12.   Current dose 3 mg in AM, 3 mg in PM    No change in dose   [employer] message sent

## 2023-01-17 ASSESSMENT — ENCOUNTER SYMPTOMS
NAUSEA: 0
HEARTBURN: 0
ABDOMINAL PAIN: 0
BLOOD IN STOOL: 0
BOWEL INCONTINENCE: 0
DIARRHEA: 1
CONSTIPATION: 0
VOMITING: 0
RECTAL PAIN: 0
BLOATING: 0
JAUNDICE: 0

## 2023-01-20 ENCOUNTER — LAB (OUTPATIENT)
Dept: LAB | Facility: CLINIC | Age: 63
End: 2023-01-20
Payer: MEDICARE

## 2023-01-20 DIAGNOSIS — Z94.0 KIDNEY REPLACED BY TRANSPLANT: Primary | ICD-10-CM

## 2023-01-20 PROCEDURE — 80197 ASSAY OF TACROLIMUS: CPT

## 2023-01-20 NOTE — TELEPHONE ENCOUNTER
Reviewed chart for purpose of pre kidney transplant evaluation planning. Pt is on dialysis since XXX. Per Dr. Adin Simmons's note 01/17/2023 in [facility], pt has ESRD on dialysis with cause of renal failure due to unspecified complication of lung transplant. Labs on 06/28/2022:creatinine 0.85 and GFR 78. S/P bilateral lung transplant at our Center 06/28/2022 for end stage lung disease due to COPD/bronchiectasis. History of smoking 30-35 years 1-1.5 packs per day, quit 11/11/2020. Last seen in clinic by Lung Transplant Provider, Kaylin CAMARILLO, on 12/21/2022, stating pt reports improvement of breathing and remains on 1L oxygen overnight. Immuno: azathioprine 100 mg daily, tacrolimus goal 8-12, prednisone taper. Hep C positive. History of abnormal PAP.     Called pt today and reached, BARTOLO SANTANA asking her to return my call.

## 2023-01-21 LAB
TACROLIMUS BLD-MCNC: 8.8 UG/L (ref 5–15)
TME LAST DOSE: NORMAL H
TME LAST DOSE: NORMAL H

## 2023-01-22 NOTE — PROGRESS NOTES
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 diff colitis, gastroparesis s/p GJ tube placement, GI bleed 2/2 pyloric ulcer, hemobilia s/p ERCP and MRCP, ESRD on iHD, admission November 2022 for pneumonia and influenza A in mid December. Other history notable for HFpEF, NTM colonoization, hepatitis C, and methamphetamine use. This is routine follow up and she is scheduled for bronch/BAL and biopsies and EGD/botox injection tomorrow.    1. S/p bilateral sequential lung transplant:  Persistent bibasilar pleural effusions:   Right hemidiaphragm palsy: overall feeling well, continues with pulmonary rehab with improving endurance. Recently had a cough that resolved. Sating 97% on room air. Using 1L NC overnight. CMV 12/21 negative. CXR reviewed today and demonstrates small left effusion vs. Scarring. PFTs down slightly today from her recent best, but only met ATS criteria for repeatability only.    - Needs to do overnight O2 study on RA, currently wearing 1L at night; consider sleep study for h/o hypercapnia  - On three drug IS including zathioprine 100 mg daily, tacrolimus (goal 8-12) and prednisone  - Dapsone qMWF for PJP ppx (Bactrim stopped d/t hyperkalemia; Pentamidine neb not tolerated)    2. 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   - DSA pending    3. EBV viremia: last level of 7K (log 3.9) on 12/21  - EBV pending      4. Hypogammaglobulinemia: IgG adequate at time of transplant, repeat level low (336) on 7/28. S/p IVIG 7/30. Last IgG of 844 on 11/11.   - IgG added on to yesterday     5. ESRD based upon cystatin C (GFR of 10): s/p tunneled line placement and HD initiated 9/26 on T/Th/Sat schedule. Missed dialysis  "today secondary to significant diarrhea, which is now improving. K of 5.2. Attempting to contact Amy Cooney.   - Repeat labs tomorrow am prior to procedure  - REsume HD      6. Diarrhea: had been fairly well controlled, then have ~ 24 hours of diarrhea, improved this morning, but missed HD per above. Likely exacerbated by TF.   - Continue fiber and imodium PRN    7. Severe gastroparesis:   Pyloric ulcer: gastric emptying study 7/20 with severe gastric emptying delay, s/p GJ tube placement in IR 7/27.  S/p EGD 8/11 with mild erythema 2/2 GJ tube trauma seen near insertion site but no active bleeding. Repeat gastric emptying study 9/30  with persistent severe retention (91% at 4h). S/p ERCP 10/7 with removal of stents and sludge; PEG/J tube replaced at that time. Repeat gastric emptying study this month demonstrated improved gastric emptying, seen by Dr. Navarro and will proceed with EGD/botox tomorrow. SARAH Cote, to touch base on cycled TF requirements.   - PPI BID  - TC cycled feeds, messaged Kera to touch base on duration of feeds    RTC: 1 months  Vaccinations: flu shot completed; Evusheld > February 2023; got the bivalent covid vaccine  Annual dermatology visit: discussed previously, will follow up  Preventative: DEXA > June 2023; will need to review colonoscopy, mammogram    Kaylin Triana PA-C  Pulmonary, Allergy, Critical Care and Sleep Medicine        Interval History:     Since last visit, things have been really well, other than last night, she was up all night secondary to diarrhea. Liquid, missed HD today. This morning things have improved. No fever or chills, no new or unexpected shortness of breath, blames her lower PFTs on no rest last night. Doing pulm rehab twice/week, pushing herself, feels her endurance is improving, walking to/from her appointment. Did have a cough on Sunday, \"scratchy\" with clear/milkly mucous and only about 10 times, then maybe 5 times yesterday. No congestion or tightness. No " chest pain or palpitations. Bloating and gas has been overall stable, mushy stools normally, maybe a little formation.          Review of Systems:   Please see HPI, otherwise the complete 10 point ROS is negative.           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     Hepatitis 2017    Hep C, [facility]     HTN (hypertension)      Osteopenia      Past Surgical History:   Procedure Laterality Date     BRONCHOSCOPY (RIGID OR FLEXIBLE), DIAGNOSTIC N/A 8/2/2022    Procedure: BRONCHOSCOPY, DIAGNOSTIC- inspection Bronch;  Surgeon: Kamala Lovell MD;  Location:  GI     BRONCHOSCOPY (RIGID OR FLEXIBLE), DIAGNOSTIC N/A 9/13/2022    Procedure: INSPECTION BRONCHOSCOPY, WITH BRONCHOALVEOLAR LAVAGE;  Surgeon: Jose R Mccullough MD;  Location:  GI     BRONCHOSCOPY (RIGID OR FLEXIBLE), DIAGNOSTIC N/A 11/9/2022    Procedure: BRONCHOSCOPY, WITH BRONCHOALVEOLAR LAVAGE AND BIOPSY;  Surgeon: Cesar Lima MD;  Location:  GI     BRONCHOSCOPY FLEXIBLE AND RIGID N/A 07/19/2022    Procedure: BRONCHOSCOPY inspection only;  Surgeon: Bob Liao MD;  Location:  GI     COLONOSCOPY  2015     CV CORONARY ANGIOGRAM N/A 06/30/2021    Procedure: CV CORONARY ANGIOGRAM;  Surgeon: Alexander Cuellar MD;  Location:  HEART CARDIAC CATH LAB     CV RIGHT HEART CATH MEASUREMENTS RECORDED N/A 06/30/2021    Procedure: CV RIGHT HEART CATH;  Surgeon: Alexander Cuellar MD;  Location:  HEART CARDIAC CATH LAB     ENDOSCOPIC RETROGRADE CHOLANGIOPANCREATOGRAM N/A 8/11/2022    Procedure: ENDOSCOPIC RETROGRADE CHOLANGIOPANCREATOGRAPHY WITH PANCREATIC DUCT NEEDLE KNIFE AND STENT PLACEMENT, BILE DUCT SPHINCTEROTOMY, BLOOD/DEBRIS REMOVAL AND STENT PLACEMENT;  Surgeon: Cosmo Arroyo MD;  Location:  OR     ENDOSCOPIC RETROGRADE  CHOLANGIOPANCREATOGRAM N/A 10/7/2022    Procedure: ENDOSCOPIC RETROGRADE CHOLANGIOPANCREATOGRAPHY with biliary and pancreatic stent removal, debris removal;  Surgeon: Cosmo Arroyo MD;  Location: [facility] OR     ENT SURGERY  1974    tonsillectomy     ENTEROSCOPY SMALL BOWEL N/A 8/11/2022    Procedure: SMALL BOWEL ENTEROSCOPY;  Surgeon: Cosmo Arroyo MD;  Location: [facility]     ESOPHAGOGASTRODUODENOSCOPY, WITH NASOGASTRIC TUBE INSERTION N/A 07/01/2022    Procedure: ESOPHAGOGASTRODUODENOSCOPY, WITH NASOJEJUNAL TUBE INSERTION;  Surgeon: Ozzy Nickerson MD;  Location:  GI     ESOPHAGOSCOPY, GASTROSCOPY, DUODENOSCOPY (EGD), COMBINED N/A 8/3/2022    Procedure: ESOPHAGOGASTRODUODENOSCOPY (EGD);  Surgeon: Ira Andres MD;  Location:  GI     HAND SURGERY       INSERT CHEST TUBE Right 9/13/2022    Procedure: Insert chest tube;  Surgeon: Jose R Mccullough MD;  [facility]     IR CVC TUNNEL PLACEMENT > 5 YRS OF AGE  9/26/2022     IR GASTRO JEJUNOSTOMY TUBE CHANGE  8/31/2022     IR GASTRO JEJUNOSTOMY TUBE CHANGE  12/21/2022     IR GASTRO JEJUNOSTOMY TUBE PLACEMENT  7/27/2022     IR THORACENTESIS  8/29/2022     LEEP TX, CERVICAL  04/07/2017    HECTOR III     LYMPH NODE BIOPSY Left 2005    Left axilla, benign- [place]     MIDLINE INSERTION - DOUBLE LUMEN Left 07/28/2022    20cm, Basilic vein     REPLACE GASTROJEJUNOSTOMY TUBE, PERCUTANEOUS  10/7/2022    Procedure: Replace Gastrojejunostomy Tube;  Surgeon: Cosmo Arroyo MD;  [facility]: [facility]     THORACENTESIS Left 8/29/2022    Procedure: THORACENTESIS;  Surgeon: Bo Capone PA-C;  Location: [facility] OR     THORACENTESIS Left 9/13/2022    Procedure: Thoracentesis;  Surgeon: Jose R Mccullough MD;  [facility]     THROMBECTOMY UPPER EXTREMITY Left 07/02/2022    Procedure: LEFT RADIAL ARM THROMBECTOMY;  Surgeon: Christie Graham MD;  Location:  OR     TRANSPLANT LUNG RECIPIENT SINGLE X2 Bilateral 06/28/2022    Procedure:  Clamshell Incision, Bilateral Sequential Lung Transplant, On Cardiopulmonary Bypass, Flexible Bronchoscopy;  Surgeon: Sue Sunshine MD;  Location: [facility] OR           Family History:     No family history on file.         Social History:     Social History     Socioeconomic History     Marital status: Single     Spouse name: Not on file     Number of children: Not on file     Years of education: Not on file     Highest education level: Not on file   Occupational History     Not on file   Tobacco Use     Smoking status: Former     Years: 30.00     Types: Cigarettes     Quit date: 2020     Years since quittin.2     Smokeless tobacco: Never   Substance and Sexual Activity     Alcohol use: Not Currently     Drug use: Not Currently     Types: Marijuana, Methamphetamines     Comment: hx:marijuana and methamphetamine-quit both unsure ?  2-3 yrs ago     Sexual activity: Not on file   Other Topics Concern     Parent/sibling w/ CABG, MI or angioplasty before 65F 55M? Not Asked   Social History Narrative     Not on file     Social Determinants of Health     Financial Resource Strain: Not on file   Food Insecurity: Not on file   Transportation Needs: Not on file   Physical Activity: Not on file   Stress: Not on file   Social Connections: Not on file   Intimate Partner Violence: Not on file   Housing Stability: Not on file            Medications:     No current outpatient medications on file.     No current facility-administered medications for this visit.            Physical Exam:   /67   Pulse 100   SpO2 97%     GENERAL: alert, NAD  HEENT: NCAT, EOMI, no scleral icterus, oral mucosa moist and without lesions  Neck: no cervical or supraclavicular adenopathy  Lungs: moderate airflow, decreased in bases   CV: RRR, S1S2, + murmur  Abdomen: normoactive BS, soft, non tender; GJ tube in place  Lymph: no edema  Neuro: AAO X 3, CN 2-12 grossly intact  Psychiatric: normal affect, good eye contact  Skin: no rash,  jaundice or lesions on limited exam         Data:   All laboratory and imaging data reviewed.      Recent Results (from the past 168 hour(s))   Basic metabolic panel    Collection Time: 01/20/23  8:28 AM   Result Value Ref Range    Sodium (External) 137 136 - 146 mmol/L    Potassium (External) 4.5 3.5 - 5.1 mmol/L    Chloride (External) (External) 96 (L) 98 - 107 mmol/L    CO2 (External) 32 (H) 22 - 29 mmol/L    Anion Gap (External) 13.5 10.0 - 20.0 mmol/L    Creatinine (External) 3.93 mg/dL    Urea Nitrogen (External) 35.5 (H) 10.0 - 20.0 mg/dL    BUN/Creatinine Ratio (External) 9.03 (L) 11.70 - 22.90 ratio    Calcium (External) 9.9 8.6 - 10.5 mg/dL    Glucose (External) 79 70 - 100 mg/dL    GFR Estimated (External) 13 (L) >=60 ml/min/1.73m2   Magnesium    Collection Time: 01/20/23  8:28 AM   Result Value Ref Range    Magnesium (External) 2.4 1.8 - 2.6 mg/dL   CBC with Platelets & Differential    Collection Time: 01/20/23  8:28 AM   Result Value Ref Range    WBC Count (External) 5.7 10(3)/uL    RBC Count (External) 3.02 10(6)/uL    Hemoglobin (External) 10.9 g/dL    Hematocrit (External) 35.3 %    MCV (External) 116.9 fL    MCH (External) 36.1 pg    MCHC (External) 30.9 (L) 32.0 - 36.0 g/dL    RDW (External) 15.1 %    Platelet Count (External) 274 146 - 368 10(3)/uL    % Neutrophils (External) 61.0 49.0 - 73.0 %    % Lymphocytes (External) 19.0 17.0 - 38.0 %    % Monocytes (External) 10.0 2.0 - 13.0 %    % Eosinophils (External) 10.0 (H) 0.0 - 4.0 %    Absolute Neutrophils (External) 3.5 1.8 - 9.2 10(3)/uL    Absolute Lymphocytes (External) 1.1 (L) 1.2 - 3.9 10(3)/uL    Absolute Monocytes (External) 0.6 0.0 - 1.1 10(3)/uL    Absolute Eosinophils (External) 0.6 0.0 - 0.8 10(3)/uL   Tacrolimus by Tandem Mass Spectrometry    Collection Time: 01/20/23  8:30 AM   Result Value Ref Range    Tacrolimus by Tandem Mass Spectrometry 8.8 5.0 - 15.0 ug/L    Tacrolimus Last Dose Date 1/19/2023     Tacrolimus Last Dose Time  8:38  PM    CBC with platelets    Collection Time: 01/24/23  1:08 PM   Result Value Ref Range    WBC Count 6.5 4.0 - 11.0 10e3/uL    RBC Count 3.07 (L) 3.80 - 5.20 10e6/uL    Hemoglobin 10.8 (L) 11.7 - 15.7 g/dL    Hematocrit 34.3 (L) 35.0 - 47.0 %     (H) 78 - 100 fL    MCH 35.2 (H) 26.5 - 33.0 pg    MCHC 31.5 31.5 - 36.5 g/dL    RDW 14.6 10.0 - 15.0 %    Platelet Count 275 150 - 450 10e3/uL   Magnesium    Collection Time: 01/24/23  1:08 PM   Result Value Ref Range    Magnesium 3.1 (H) 1.7 - 2.3 mg/dL   Basic metabolic panel    Collection Time: 01/24/23  1:08 PM   Result Value Ref Range    Sodium 139 136 - 145 mmol/L    Potassium 5.2 3.4 - 5.3 mmol/L    Chloride 96 (L) 98 - 107 mmol/L    Carbon Dioxide (CO2) 28 22 - 29 mmol/L    Anion Gap 15 7 - 15 mmol/L    Urea Nitrogen 78.7 (H) 8.0 - 23.0 mg/dL    Creatinine 6.86 (H) 0.51 - 0.95 mg/dL    Calcium 10.3 (H) 8.8 - 10.2 mg/dL    Glucose 139 (H) 70 - 99 mg/dL    GFR Estimate 6 (L) >60 mL/min/1.73m2   General PFT Lab (Please always keep checked)    Collection Time: 01/24/23  1:17 PM   Result Value Ref Range    FVC-Pred 2.81 L    FVC-Pre 1.56 L    FVC-%Pred-Pre 55 %    FEV1-Pre 1.50 L    FEV1-%Pred-Pre 66 %    FEV1FVC-Pred 80 %    FEV1FVC-Pre 96 %    FEFMax-Pred 6.14 L/sec    FEFMax-Pre 5.56 L/sec    FEFMax-%Pred-Pre 90 %    FEF2575-Pred 2.11 L/sec    FEF2575-Pre 3.80 L/sec    EFJ1975-%Pred-Pre 180 %    ExpTime-Pre 4.53 sec    FIFMax-Pre 3.36 L/sec    FEV1FEV6-Pred 81 %    FEV1FEV6-Pre 98 %     PFT interpretation:  Maneuver: valid and met ATS guidelines, but only for repeatability  Mild obstruction with Z score -2.09  Compared to prior: FEV1 of 1.50 is 40 ml below prior

## 2023-01-23 NOTE — RESULT ENCOUNTER NOTE
Tacrolimus level 8.8 at 12 hours, on 1/20/23.  Goal 8-12.   Current dose 3 mg in AM, 3 mg in PM    No change in dose   [employer] message sent

## 2023-01-24 ENCOUNTER — MYC MEDICAL ADVICE (OUTPATIENT)
Dept: PULMONOLOGY | Facility: CLINIC | Age: 63
End: 2023-01-24

## 2023-01-24 ENCOUNTER — ANCILLARY PROCEDURE (OUTPATIENT)
Dept: GENERAL RADIOLOGY | Facility: CLINIC | Age: 63
End: 2023-01-24
Attending: PHYSICIAN ASSISTANT
Payer: MEDICARE

## 2023-01-24 ENCOUNTER — OFFICE VISIT (OUTPATIENT)
Dept: PULMONOLOGY | Facility: CLINIC | Age: 63
End: 2023-01-24
Attending: PHYSICIAN ASSISTANT
Payer: MEDICARE

## 2023-01-24 ENCOUNTER — LAB (OUTPATIENT)
Dept: LAB | Facility: CLINIC | Age: 63
End: 2023-01-24
Attending: PHYSICIAN ASSISTANT
Payer: MEDICARE

## 2023-01-24 VITALS — HEART RATE: 100 BPM | DIASTOLIC BLOOD PRESSURE: 67 MMHG | OXYGEN SATURATION: 97 % | SYSTOLIC BLOOD PRESSURE: 115 MMHG

## 2023-01-24 DIAGNOSIS — N18.6 ESRD (END STAGE RENAL DISEASE) (H): ICD-10-CM

## 2023-01-24 DIAGNOSIS — Z94.2 LUNG REPLACED BY TRANSPLANT (H): Primary | ICD-10-CM

## 2023-01-24 DIAGNOSIS — Z94.2 LUNG REPLACED BY TRANSPLANT (H): ICD-10-CM

## 2023-01-24 DIAGNOSIS — D80.1 HYPOGAMMAGLOBULINEMIA (H): ICD-10-CM

## 2023-01-24 DIAGNOSIS — Z94.2 S/P LUNG TRANSPLANT (H): ICD-10-CM

## 2023-01-24 LAB
ANION GAP SERPL CALCULATED.3IONS-SCNC: 15 MMOL/L (ref 7–15)
BUN SERPL-MCNC: 78.7 MG/DL (ref 8–23)
CALCIUM SERPL-MCNC: 10.3 MG/DL (ref 8.8–10.2)
CHLORIDE SERPL-SCNC: 96 MMOL/L (ref 98–107)
CREAT SERPL-MCNC: 6.86 MG/DL (ref 0.51–0.95)
DEPRECATED HCO3 PLAS-SCNC: 28 MMOL/L (ref 22–29)
ERYTHROCYTE [DISTWIDTH] IN BLOOD BY AUTOMATED COUNT: 14.6 % (ref 10–15)
EXPTIME-PRE: 4.53 SEC
FEF2575-%PRED-PRE: 180 %
FEF2575-PRE: 3.8 L/SEC
FEF2575-PRED: 2.11 L/SEC
FEFMAX-%PRED-PRE: 90 %
FEFMAX-PRE: 5.56 L/SEC
FEFMAX-PRED: 6.14 L/SEC
FEV1-%PRED-PRE: 66 %
FEV1-PRE: 1.5 L
FEV1FEV6-PRE: 98 %
FEV1FEV6-PRED: 81 %
FEV1FVC-PRE: 96 %
FEV1FVC-PRED: 80 %
FIFMAX-PRE: 3.36 L/SEC
FVC-%PRED-PRE: 55 %
FVC-PRE: 1.56 L
FVC-PRED: 2.81 L
GFR SERPL CREATININE-BSD FRML MDRD: 6 ML/MIN/1.73M2
GLUCOSE SERPL-MCNC: 139 MG/DL (ref 70–99)
HCT VFR BLD AUTO: 34.3 % (ref 35–47)
HGB BLD-MCNC: 10.8 G/DL (ref 11.7–15.7)
MAGNESIUM SERPL-MCNC: 3.1 MG/DL (ref 1.7–2.3)
MCH RBC QN AUTO: 35.2 PG (ref 26.5–33)
MCHC RBC AUTO-ENTMCNC: 31.5 G/DL (ref 31.5–36.5)
MCV RBC AUTO: 112 FL (ref 78–100)
PLATELET # BLD AUTO: 275 10E3/UL (ref 150–450)
POTASSIUM SERPL-SCNC: 5.2 MMOL/L (ref 3.4–5.3)
RBC # BLD AUTO: 3.07 10E6/UL (ref 3.8–5.2)
SODIUM SERPL-SCNC: 139 MMOL/L (ref 136–145)
WBC # BLD AUTO: 6.5 10E3/UL (ref 4–11)

## 2023-01-24 PROCEDURE — 85027 COMPLETE CBC AUTOMATED: CPT | Performed by: PATHOLOGY

## 2023-01-24 PROCEDURE — 36415 COLL VENOUS BLD VENIPUNCTURE: CPT | Performed by: PATHOLOGY

## 2023-01-24 PROCEDURE — 99214 OFFICE O/P EST MOD 30 MIN: CPT | Mod: 25 | Performed by: PHYSICIAN ASSISTANT

## 2023-01-24 PROCEDURE — 94375 RESPIRATORY FLOW VOLUME LOOP: CPT | Performed by: PHYSICIAN ASSISTANT

## 2023-01-24 PROCEDURE — 86833 HLA CLASS II HIGH DEFIN QUAL: CPT | Performed by: INTERNAL MEDICINE

## 2023-01-24 PROCEDURE — G0463 HOSPITAL OUTPT CLINIC VISIT: HCPCS | Performed by: PHYSICIAN ASSISTANT

## 2023-01-24 PROCEDURE — 80048 BASIC METABOLIC PNL TOTAL CA: CPT | Performed by: PATHOLOGY

## 2023-01-24 PROCEDURE — 71046 X-RAY EXAM CHEST 2 VIEWS: CPT | Mod: GC | Performed by: RADIOLOGY

## 2023-01-24 PROCEDURE — 87799 DETECT AGENT NOS DNA QUANT: CPT | Performed by: INTERNAL MEDICINE

## 2023-01-24 PROCEDURE — 83735 ASSAY OF MAGNESIUM: CPT | Performed by: PATHOLOGY

## 2023-01-24 PROCEDURE — 86832 HLA CLASS I HIGH DEFIN QUAL: CPT | Performed by: INTERNAL MEDICINE

## 2023-01-24 NOTE — NURSING NOTE
Chief Complaint   Patient presents with     Follow Up     Ltx f/u     Vitals were taken and medications were reconciled.     YG Rothman

## 2023-01-24 NOTE — NURSING NOTE
Transplant Coordinator Note    Reason for visit: Post lung transplant follow up visit   Coordinator: Present (Veronique in person)  Caregiver:  Not present    Health concerns addressed today:  1. Missed dialysis today d/t diarrhea  2. EGD/botox and bronch tomorrow  3. Respiratory: intermittent cough  4. GI/: loose stool continues; TF at night    Activity/rehab: Up ad magalie, pulm rehab twice per week  Oxygen needs: Only using oxygen at night, 1L.  Pain management/RX: tylenol and oxycodone as needed, incisional pain from time to time.   Diabetic management: managed by PCP; checking twice per day  Next Bronch due: 1/25/23   Risk Criteria Labs: negative 7/28/22  CMV status: D+/R+  Valcyte stopped: POD 8-90  EBV status: D+/R+  AC/asa:  ASA discontinued after recent hospitalization   PJP prophylactic: Dapsone   Diet: Full liquid for pleasure. TF 12:00 noon-6AM (18 hours/day)    COVID:  1. COVID-19 infection (yes/no, date of most recent positive test):    2. Status/instructions given about COVID-19 vaccine: Vaccinated, booster x2 last 3/21/22. Received Evusheld 8/24/2022. Next due 2/24/2023. Received flu shot 10/12/22.     Pt Education: medications (use/dose/side effects), how/when to call coordinator, frequency of labs, s/s of infection/rejection, call prior to starting any new medications, lab/vital sign book    Health Maintenance:     Last colonoscopy:     Next colonoscopy due:     Dermatology:    Vaccinations this visit:     Labs, CXR, PFTs reviewed with patient  Medication record reviewed and reconciled  Questions and concerns addressed    Patient Instructions  1. Continue to hydrate with 60-70 oz fluids daily.  2. Continue to exercise daily or most days of the week.  3. Follow up with your primary care provider for annual gender health maintenance procedures.  4. Follow up with colonoscopy schedule.  5. Follow up with annual dermatology visits.  6. It doesn't seem like the COVID vaccine is working well in lung transplant  patients. A number of lung transplant patients have gotten sick with COVID even after receiving the vaccines.  Based on our recent experience, it can be life-threatening to get COVID  even after being vaccinated. Please continue to act like you did not get the COVID vaccine - social distancing, wearing a mask, good hand hygiene, etc. If the people around you are vaccinated, it will help reduce the risk of you getting COVID. All members of your household should be vaccinated.  7. Kera, our transplant dietitian, will reach out to you about your tube feeding.  8. Call Amy, your kidney coordinator, at [phone].  9. We will look into whether or not we need to resend overnight oximetry orders.  10. Let us know when you are due for your next colonoscopy.  11. Check your blood sugar twice per day.  Follow up with your primary care provider if you notice your blood sugar is <70 or >200 consistently.    You are scheduled for a bronchoscopy on January 25th at 11:00 AM at the [facility] End[facility] Suite on the 3rd floor. Arrive 2 hours early to recheck labs at the [facility] Waiting room.    Instructions     1. Nothing to eat or drink 8 hours before procedure.  2. Hold your morning medications. Bring them with you to take after the procedure.  3. Have a  available to take you home.   4. Take metoprolol with a sip of water the AM of the procedure.    Next transplant clinic appointment:  1 month with CXR, labs and PFTs  Next lab draw: tomorrow      AVS printed at time of check out

## 2023-01-24 NOTE — PATIENT INSTRUCTIONS
Patient Instructions  1. Continue to hydrate with 60-70 oz fluids daily.  2. Continue to exercise daily or most days of the week.  3. Follow up with your primary care provider for annual gender health maintenance procedures.  4. Follow up with colonoscopy schedule.  5. Follow up with annual dermatology visits.  6. It doesn't seem like the COVID vaccine is working well in lung transplant patients. A number of lung transplant patients have gotten sick with COVID even after receiving the vaccines.  Based on our recent experience, it can be life-threatening to get COVID  even after being vaccinated. Please continue to act like you did not get the COVID vaccine - social distancing, wearing a mask, good hand hygiene, etc. If the people around you are vaccinated, it will help reduce the risk of you getting COVID. All members of your household should be vaccinated.  7. Kera, our transplant dietitian, will reach out to you about your tube feeding.  8. Call Amy, your kidney coordinator, at [phone].  9. We will look into whether or not we need to resend overnight oximetry orders.  10. Let us know when you are due for your next colonoscopy.  11. Check your blood sugar twice per day.  Follow up with your primary care provider if you notice your blood sugar is <70 or >200 consistently.    You are scheduled for a bronchoscopy on January 25th at 11:00 AM at the [facility] Endoscopy Suite on the 3rd floor. Arrive 2 hours early to recheck labs at the [facility] Waiting room.    Instructions     1. Nothing to eat or drink 8 hours before procedure.  2. Hold your morning medications. Bring them with you to take after the procedure.  3. Have a  available to take you home.   4. Take metoprolol with a sip of water the AM of the procedure.    Next transplant clinic appointment:  1 month with CXR, labs and PFTs  Next lab draw: tomorrow    ~~~~~~~~~~~~~~~~~~~~~~~~~    Thoracic Transplant Office phone  [phone], fax [phone]    Office Hours 8:30 - 5:00     For after-hours urgent issues, please dial [phone], and ask to speak with the Thoracic Transplant Coordinator On-Call.  --------------------  To expedite your medication refill(s), please contact your pharmacy and have them fax a refill request to: [phone]  .   *Please allow 3 business days for routine medication refills.  *Please allow 5 business days for controlled substance medication refills.    **For Diabetic medications and supplies refill(s), please contact your pharmacy and have them contact your Endocrine team.  --------------------  For scheduling appointments call [phone].  --------------------  Please Note: If you are active on [email or web address], all future test results will be sent by [email or web address] message only, and will no longer be called to patient. You may also receive communication directly from your physician.

## 2023-01-24 NOTE — PROGRESS NOTES
Bronchoscopy with lavage and biopsies   Date of transplant 6/28/2022   Transplant type bilateral lung  Date of labs 1/24/23  BUN 78.7 DDAVP Yes- ordered   Plt 275  INR (pending- pt will have INR + stat BMP drawn in gold waiting room 1/25 at 9:15 AM)  COVID: Pt will bring in picture of negative home covid test     Comment: Please page Kaylin Triana with any questions or concerns [phone]

## 2023-01-24 NOTE — LETTER
1/24/2023         RE: Sofie Rodriguez  [address]  [place] [address]        Dear Colleague,    Thank you for referring your patient, Sofie Rodriguez, to the [facility] [place]. Please see a copy of my visit note below.    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 diff colitis, gastroparesis s/p GJ tube placement, GI bleed 2/2 pyloric ulcer, hemobilia s/p ERCP and MRCP, ESRD on iHD, admission November 2022 for pneumonia and influenza A in mid December. Other history notable for HFpEF, NTM colonoization, hepatitis C, and methamphetamine use. This is routine follow up and she is scheduled for bronch/BAL and biopsies and EGD/botox injection tomorrow.    1. S/p bilateral sequential lung transplant:  Persistent bibasilar pleural effusions:   Right hemidiaphragm palsy: overall feeling well, continues with pulmonary rehab with improving endurance. Recently had a cough that resolved. Sating 97% on room air. Using 1L NC overnight. CMV 12/21 negative. CXR reviewed today and demonstrates small left effusion vs. Scarring. PFTs down slightly today from her recent best, but only met ATS criteria for repeatability only.    - Needs to do overnight O2 study on RA, currently wearing 1L at night; consider sleep study for h/o hypercapnia  - On three drug IS including zathioprine 100 mg daily, tacrolimus (goal 8-12) and prednisone  - Dapsone qMWF for PJP ppx (Bactrim stopped d/t hyperkalemia; Pentamidine neb not tolerated)    2. 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   - DSA pending    3. EBV viremia: last level of 7K (log 3.9) on 12/21  - EBV pending      4.  Hypogammaglobulinemia: IgG adequate at time of transplant, repeat level low (336) on 7/28. S/p IVIG 7/30. Last IgG of 844 on 11/11.   - IgG added on to yesterday     5. ESRD based upon cystatin C (GFR of 10): s/p tunneled line placement and HD initiated 9/26 on T/Th/Sat schedule. Missed dialysis today secondary to significant diarrhea, which is now improving. K of 5.2. Attempting to contact Amy Cooney.   - Repeat labs tomorrow am prior to procedure  - REsume HD      6. Diarrhea: had been fairly well controlled, then have ~ 24 hours of diarrhea, improved this morning, but missed HD per above. Likely exacerbated by TF.   - Continue fiber and imodium PRN    7. Severe gastroparesis:   Pyloric ulcer: gastric emptying study 7/20 with severe gastric emptying delay, s/p GJ tube placement in IR 7/27.  S/p EGD 8/11 with mild erythema 2/2 GJ tube trauma seen near insertion site but no active bleeding. Repeat gastric emptying study 9/30  with persistent severe retention (91% at 4h). S/p ERCP 10/7 with removal of stents and sludge; PEG/J tube replaced at that time. Repeat gastric emptying study this month demonstrated improved gastric emptying, seen by Dr. Navarro and will proceed with EGD/botox tomorrow. SARAH Cote, to touch base on cycled TF requirements.   - PPI BID  - TC cycled feeds, messaged Kera to touch base on duration of feeds    RTC: 1 months  Vaccinations: flu shot completed; Evusheld > February 2023; got the bivalent covid vaccine  Annual dermatology visit: discussed previously, will follow up  Preventative: DEXA > June 2023; will need to review colonoscopy, mammogram    Kaylin Triana PA-C  Pulmonary, Allergy, Critical Care and Sleep Medicine        Interval History:     Since last visit, things have been really well, other than last night, she was up all night secondary to diarrhea. Liquid, missed HD today. This morning things have improved. No fever or chills, no new or unexpected shortness of breath, blames her  "lower PFTs on no rest last night. Doing pulm rehab twice/week, pushing herself, feels her endurance is improving, walking to/from her appointment. Did have a cough on Sunday, \"scratchy\" with clear/milkly mucous and only about 10 times, then maybe 5 times yesterday. No congestion or tightness. No chest pain or palpitations. Bloating and gas has been overall stable, mushy stools normally, maybe a little formation.          Review of Systems:   Please see HPI, otherwise the complete 10 point ROS is negative.           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     Hepatitis 2017    Hep C, [facility]     HTN (hypertension)      Osteopenia      Past Surgical History:   Procedure Laterality Date     BRONCHOSCOPY (RIGID OR FLEXIBLE), DIAGNOSTIC N/A 8/2/2022    Procedure: BRONCHOSCOPY, DIAGNOSTIC- inspection Bronch;  Surgeon: Kamala Lovell MD;  Location:  GI     BRONCHOSCOPY (RIGID OR FLEXIBLE), DIAGNOSTIC N/A 9/13/2022    Procedure: INSPECTION BRONCHOSCOPY, WITH BRONCHOALVEOLAR LAVAGE;  Surgeon: Jose R Mccullough MD;  Location:  GI     BRONCHOSCOPY (RIGID OR FLEXIBLE), DIAGNOSTIC N/A 11/9/2022    Procedure: BRONCHOSCOPY, WITH BRONCHOALVEOLAR LAVAGE AND BIOPSY;  Surgeon: Cesar Lima MD;  Location:  GI     BRONCHOSCOPY FLEXIBLE AND RIGID N/A 07/19/2022    Procedure: BRONCHOSCOPY inspection only;  Surgeon: Bob Liao MD;  Location:  GI     COLONOSCOPY  2015     CV CORONARY ANGIOGRAM N/A 06/30/2021    Procedure: CV CORONARY ANGIOGRAM;  Surgeon: Alexander Cuellar MD;  Location:  HEART CARDIAC CATH LAB     CV RIGHT HEART CATH MEASUREMENTS RECORDED N/A 06/30/2021    Procedure: CV RIGHT HEART CATH;  Surgeon: Alexander Cuellar MD;  Location:  HEART CARDIAC CATH LAB     ENDOSCOPIC RETROGRADE " CHOLANGIOPANCREATOGRAM N/A 8/11/2022    Procedure: ENDOSCOPIC RETROGRADE CHOLANGIOPANCREATOGRAPHY WITH PANCREATIC DUCT NEEDLE KNIFE AND STENT PLACEMENT, BILE DUCT SPHINCTEROTOMY, BLOOD/DEBRIS REMOVAL AND STENT PLACEMENT;  Surgeon: Cosmo Arroyo MD;  Location: [facility] OR     ENDOSCOPIC RETROGRADE CHOLANGIOPANCREATOGRAM N/A 10/7/2022    Procedure: ENDOSCOPIC RETROGRADE CHOLANGIOPANCREATOGRAPHY with biliary and pancreatic stent removal, debris removal;  Surgeon: Cosmo Arroyo MD;  Location: [facility] OR     ENT SURGERY  1974    tonsillectomy     ENTEROSCOPY SMALL BOWEL N/A 8/11/2022    Procedure: SMALL BOWEL ENTEROSCOPY;  Surgeon: Cosmo Arroyo MD;  Location: [facility] OR     ESOPHAGOGASTRODUODENOSCOPY, WITH NASOGASTRIC TUBE INSERTION N/A 07/01/2022    Procedure: ESOPHAGOGASTRODUODENOSCOPY, WITH NASOJEJUNAL TUBE INSERTION;  Surgeon: Ozzy Nickerson MD;  Location:  GI     ESOPHAGOSCOPY, GASTROSCOPY, DUODENOSCOPY (EGD), COMBINED N/A 8/3/2022    Procedure: ESOPHAGOGASTRODUODENOSCOPY (EGD);  Surgeon: Ira Andres MD;  Location:  GI     HAND SURGERY       INSERT CHEST TUBE Right 9/13/2022    Procedure: Insert chest tube;  Surgeon: Jose R Mccullough MD;  [facility]     IR CVC TUNNEL PLACEMENT > 5 YRS OF AGE  9/26/2022     IR GASTRO JEJUNOSTOMY TUBE CHANGE  8/31/2022     IR GASTRO JEJUNOSTOMY TUBE CHANGE  12/21/2022     IR GASTRO JEJUNOSTOMY TUBE PLACEMENT  7/27/2022     IR THORACENTESIS  8/29/2022     LEEP TX, CERVICAL  04/07/2017    HECTOR III     LYMPH NODE BIOPSY Left 2005    Left axilla, benign- Mulat     MIDLINE INSERTION - DOUBLE LUMEN Left 07/28/2022    20cm, Basilic vein     REPLACE GASTROJEJUNOSTOMY TUBE, PERCUTANEOUS  10/7/2022    Procedure: Replace Gastrojejunostomy Tube;  Surgeon: Cosmo Arroyo MD;  Location: [facility]     THORACENTESIS Left 8/29/2022    Procedure: THORACENTESIS;  Surgeon: Bo Capone PA-C;  Location: [facility] OR     THORACENTESIS Left 9/13/2022     Procedure: Thoracentesis;  Surgeon: Jose R Mccullough MD;  Location:  GI     THROMBECTOMY UPPER EXTREMITY Left 2022    Procedure: LEFT RADIAL ARM THROMBECTOMY;  Surgeon: Christie Graham MD;  Location:  OR     TRANSPLANT LUNG RECIPIENT SINGLE X2 Bilateral 2022    Procedure: Clamshell Incision, Bilateral Sequential Lung Transplant, On Cardiopulmonary Bypass, Flexible Bronchoscopy;  Surgeon: Sue Sunshine MD;  Location:  OR           Family History:     No family history on file.         Social History:     Social History     Socioeconomic History     Marital status: Single     Spouse name: Not on file     Number of children: Not on file     Years of education: Not on file     Highest education level: Not on file   Occupational History     Not on file   Tobacco Use     Smoking status: Former     Years: 30.00     Types: Cigarettes     Quit date: 2020     Years since quittin.2     Smokeless tobacco: Never   Substance and Sexual Activity     Alcohol use: Not Currently     Drug use: Not Currently     Types: Marijuana, Methamphetamines     Comment: hx:marijuana and methamphetamine-quit both unsure ?  2-3 yrs ago     Sexual activity: Not on file   Other Topics Concern     Parent/sibling w/ CABG, MI or angioplasty before 65F 55M? Not Asked   Social History Narrative     Not on file     Social Determinants of Health     Financial Resource Strain: Not on file   Food Insecurity: Not on file   Transportation Needs: Not on file   Physical Activity: Not on file   Stress: Not on file   Social Connections: Not on file   Intimate Partner Violence: Not on file   Housing Stability: Not on file            Medications:     No current outpatient medications on file.     No current facility-administered medications for this visit.            Physical Exam:   /67   Pulse 100   SpO2 97%     GENERAL: alert, NAD  HEENT: NCAT, EOMI, no scleral icterus, oral mucosa moist and without  lesions  Neck: no cervical or supraclavicular adenopathy  Lungs: moderate airflow, decreased in bases   CV: RRR, S1S2, + murmur  Abdomen: normoactive BS, soft, non tender; GJ tube in place  Lymph: no edema  Neuro: AAO X 3, CN 2-12 grossly intact  Psychiatric: normal affect, good eye contact  Skin: no rash, jaundice or lesions on limited exam         Data:   All laboratory and imaging data reviewed.      Recent Results (from the past 168 hour(s))   Basic metabolic panel    Collection Time: 01/20/23  8:28 AM   Result Value Ref Range    Sodium (External) 137 136 - 146 mmol/L    Potassium (External) 4.5 3.5 - 5.1 mmol/L    Chloride (External) (External) 96 (L) 98 - 107 mmol/L    CO2 (External) 32 (H) 22 - 29 mmol/L    Anion Gap (External) 13.5 10.0 - 20.0 mmol/L    Creatinine (External) 3.93 mg/dL    Urea Nitrogen (External) 35.5 (H) 10.0 - 20.0 mg/dL    BUN/Creatinine Ratio (External) 9.03 (L) 11.70 - 22.90 ratio    Calcium (External) 9.9 8.6 - 10.5 mg/dL    Glucose (External) 79 70 - 100 mg/dL    GFR Estimated (External) 13 (L) >=60 ml/min/1.73m2   Magnesium    Collection Time: 01/20/23  8:28 AM   Result Value Ref Range    Magnesium (External) 2.4 1.8 - 2.6 mg/dL   CBC with Platelets & Differential    Collection Time: 01/20/23  8:28 AM   Result Value Ref Range    WBC Count (External) 5.7 10(3)/uL    RBC Count (External) 3.02 10(6)/uL    Hemoglobin (External) 10.9 g/dL    Hematocrit (External) 35.3 %    MCV (External) 116.9 fL    MCH (External) 36.1 pg    MCHC (External) 30.9 (L) 32.0 - 36.0 g/dL    RDW (External) 15.1 %    Platelet Count (External) 274 146 - 368 10(3)/uL    % Neutrophils (External) 61.0 49.0 - 73.0 %    % Lymphocytes (External) 19.0 17.0 - 38.0 %    % Monocytes (External) 10.0 2.0 - 13.0 %    % Eosinophils (External) 10.0 (H) 0.0 - 4.0 %    Absolute Neutrophils (External) 3.5 1.8 - 9.2 10(3)/uL    Absolute Lymphocytes (External) 1.1 (L) 1.2 - 3.9 10(3)/uL    Absolute Monocytes (External) 0.6 0.0 - 1.1  10(3)/uL    Absolute Eosinophils (External) 0.6 0.0 - 0.8 10(3)/uL   Tacrolimus by Tandem Mass Spectrometry    Collection Time: 01/20/23  8:30 AM   Result Value Ref Range    Tacrolimus by Tandem Mass Spectrometry 8.8 5.0 - 15.0 ug/L    Tacrolimus Last Dose Date 1/19/2023     Tacrolimus Last Dose Time  8:38 PM    CBC with platelets    Collection Time: 01/24/23  1:08 PM   Result Value Ref Range    WBC Count 6.5 4.0 - 11.0 10e3/uL    RBC Count 3.07 (L) 3.80 - 5.20 10e6/uL    Hemoglobin 10.8 (L) 11.7 - 15.7 g/dL    Hematocrit 34.3 (L) 35.0 - 47.0 %     (H) 78 - 100 fL    MCH 35.2 (H) 26.5 - 33.0 pg    MCHC 31.5 31.5 - 36.5 g/dL    RDW 14.6 10.0 - 15.0 %    Platelet Count 275 150 - 450 10e3/uL   Magnesium    Collection Time: 01/24/23  1:08 PM   Result Value Ref Range    Magnesium 3.1 (H) 1.7 - 2.3 mg/dL   Basic metabolic panel    Collection Time: 01/24/23  1:08 PM   Result Value Ref Range    Sodium 139 136 - 145 mmol/L    Potassium 5.2 3.4 - 5.3 mmol/L    Chloride 96 (L) 98 - 107 mmol/L    Carbon Dioxide (CO2) 28 22 - 29 mmol/L    Anion Gap 15 7 - 15 mmol/L    Urea Nitrogen 78.7 (H) 8.0 - 23.0 mg/dL    Creatinine 6.86 (H) 0.51 - 0.95 mg/dL    Calcium 10.3 (H) 8.8 - 10.2 mg/dL    Glucose 139 (H) 70 - 99 mg/dL    GFR Estimate 6 (L) >60 mL/min/1.73m2   General PFT Lab (Please always keep checked)    Collection Time: 01/24/23  1:17 PM   Result Value Ref Range    FVC-Pred 2.81 L    FVC-Pre 1.56 L    FVC-%Pred-Pre 55 %    FEV1-Pre 1.50 L    FEV1-%Pred-Pre 66 %    FEV1FVC-Pred 80 %    FEV1FVC-Pre 96 %    FEFMax-Pred 6.14 L/sec    FEFMax-Pre 5.56 L/sec    FEFMax-%Pred-Pre 90 %    FEF2575-Pred 2.11 L/sec    FEF2575-Pre 3.80 L/sec    GQH3681-%Pred-Pre 180 %    ExpTime-Pre 4.53 sec    FIFMax-Pre 3.36 L/sec    FEV1FEV6-Pred 81 %    FEV1FEV6-Pre 98 %     PFT interpretation:  Maneuver: valid and met ATS guidelines, but only for repeatability  Mild obstruction with Z score -2.09  Compared to prior: FEV1 of 1.50 is 40 ml below  prior        Again, thank you for allowing me to participate in the care of your patient.        Sincerely,        Kaylin Triana PA-C

## 2023-01-25 ENCOUNTER — HOSPITAL ENCOUNTER (OUTPATIENT)
Facility: CLINIC | Age: 63
Discharge: HOME | End: 2023-01-25
Attending: INTERNAL MEDICINE | Admitting: INTERNAL MEDICINE
Payer: MEDICARE

## 2023-01-25 ENCOUNTER — TELEPHONE (OUTPATIENT)
Dept: TRANSPLANT | Facility: CLINIC | Age: 63
End: 2023-01-25

## 2023-01-25 VITALS
SYSTOLIC BLOOD PRESSURE: 106 MMHG | TEMPERATURE: 97.9 F | HEART RATE: 81 BPM | BODY MASS INDEX: 23.37 KG/M2 | WEIGHT: 136.91 LBS | HEIGHT: 64 IN | OXYGEN SATURATION: 91 % | DIASTOLIC BLOOD PRESSURE: 64 MMHG | RESPIRATION RATE: 16 BRPM

## 2023-01-25 DIAGNOSIS — Z94.2 LUNG REPLACED BY TRANSPLANT (H): ICD-10-CM

## 2023-01-25 LAB
ANION GAP SERPL CALCULATED.3IONS-SCNC: 21 MMOL/L (ref 7–15)
APPEARANCE FLD: CLEAR
BUN SERPL-MCNC: 98.8 MG/DL (ref 8–23)
C PNEUM DNA SPEC QL NAA+PROBE: NOT DETECTED
CALCIUM SERPL-MCNC: 9.4 MG/DL (ref 8.8–10.2)
CELL COUNT BODY FLUID SOURCE: NORMAL
CHLORIDE SERPL-SCNC: 97 MMOL/L (ref 98–107)
CMV DNA SPEC NAA+PROBE-ACNC: NOT DETECTED IU/ML
COLOR FLD: COLORLESS
CREAT SERPL-MCNC: 8.27 MG/DL (ref 0.51–0.95)
DEPRECATED HCO3 PLAS-SCNC: 21 MMOL/L (ref 22–29)
EBV DNA COPIES/ML, INSTRUMENT: 3218 COPIES/ML
EBV DNA SPEC NAA+PROBE-LOG#: 3.5 {LOG_COPIES}/ML
EOSINOPHIL NFR FLD MANUAL: 1 %
FLUAV H1 2009 PAND RNA SPEC QL NAA+PROBE: NOT DETECTED
FLUAV H1 RNA SPEC QL NAA+PROBE: NOT DETECTED
FLUAV H3 RNA SPEC QL NAA+PROBE: NOT DETECTED
FLUAV RNA SPEC QL NAA+PROBE: NOT DETECTED
FLUBV RNA SPEC QL NAA+PROBE: NOT DETECTED
GFR SERPL CREATININE-BSD FRML MDRD: 5 ML/MIN/1.73M2
GLUCOSE BLDC GLUCOMTR-MCNC: 95 MG/DL (ref 70–99)
GLUCOSE SERPL-MCNC: 104 MG/DL (ref 70–99)
GRAM STAIN RESULT: NORMAL
GRAM STAIN RESULT: NORMAL
HADV DNA SPEC QL NAA+PROBE: NOT DETECTED
HCOV PNL SPEC NAA+PROBE: NOT DETECTED
HMPV RNA SPEC QL NAA+PROBE: NOT DETECTED
HPIV1 RNA SPEC QL NAA+PROBE: NOT DETECTED
HPIV2 RNA SPEC QL NAA+PROBE: NOT DETECTED
HPIV3 RNA SPEC QL NAA+PROBE: NOT DETECTED
HPIV4 RNA SPEC QL NAA+PROBE: NOT DETECTED
INR PPP: 1.06 (ref 0.85–1.15)
LYMPHOCYTES NFR FLD MANUAL: 4 %
M PNEUMO DNA SPEC QL NAA+PROBE: NOT DETECTED
MONOS+MACROS NFR FLD MANUAL: 93 %
NEUTS BAND NFR FLD MANUAL: 2 %
PATH REPORT.COMMENTS IMP SPEC: NORMAL
PATH REPORT.COMMENTS IMP SPEC: NORMAL
PATH REPORT.FINAL DX SPEC: NORMAL
PATH REPORT.GROSS SPEC: NORMAL
PATH REPORT.MICROSCOPIC SPEC OTHER STN: NORMAL
PATH REPORT.RELEVANT HX SPEC: NORMAL
POTASSIUM SERPL-SCNC: 4.7 MMOL/L (ref 3.4–5.3)
RSV RNA SPEC QL NAA+PROBE: NOT DETECTED
RSV RNA SPEC QL NAA+PROBE: NOT DETECTED
RV+EV RNA SPEC QL NAA+PROBE: NOT DETECTED
SODIUM SERPL-SCNC: 139 MMOL/L (ref 136–145)
UPPER GI ENDOSCOPY: NORMAL
WBC # FLD AUTO: 142 /UL

## 2023-01-25 PROCEDURE — 88312 SPECIAL STAINS GROUP 1: CPT | Mod: 26 | Performed by: PATHOLOGY

## 2023-01-25 PROCEDURE — 99153 MOD SED SAME PHYS/QHP EA: CPT

## 2023-01-25 PROCEDURE — 82962 GLUCOSE BLOOD TEST: CPT

## 2023-01-25 PROCEDURE — 87102 FUNGUS ISOLATION CULTURE: CPT | Performed by: INTERNAL MEDICINE

## 2023-01-25 PROCEDURE — 87116 MYCOBACTERIA CULTURE: CPT | Performed by: INTERNAL MEDICINE

## 2023-01-25 PROCEDURE — 87205 SMEAR GRAM STAIN: CPT | Performed by: INTERNAL MEDICINE

## 2023-01-25 PROCEDURE — 99152 MOD SED SAME PHYS/QHP 5/>YRS: CPT | Performed by: INTERNAL MEDICINE

## 2023-01-25 PROCEDURE — 87206 SMEAR FLUORESCENT/ACID STAI: CPT | Performed by: INTERNAL MEDICINE

## 2023-01-25 PROCEDURE — 88108 CYTOPATH CONCENTRATE TECH: CPT | Mod: 26 | Performed by: PATHOLOGY

## 2023-01-25 PROCEDURE — 80048 BASIC METABOLIC PNL TOTAL CA: CPT | Performed by: PHYSICIAN ASSISTANT

## 2023-01-25 PROCEDURE — 250N000009 HC RX 250: Performed by: INTERNAL MEDICINE

## 2023-01-25 PROCEDURE — 87633 RESP VIRUS 12-25 TARGETS: CPT | Performed by: INTERNAL MEDICINE

## 2023-01-25 PROCEDURE — 43236 UPPR GI SCOPE W/SUBMUC INJ: CPT | Performed by: INTERNAL MEDICINE

## 2023-01-25 PROCEDURE — 87070 CULTURE OTHR SPECIMN AEROBIC: CPT | Performed by: INTERNAL MEDICINE

## 2023-01-25 PROCEDURE — 250N000020 HC RX IP 250 OP 636 J0585: Performed by: INTERNAL MEDICINE

## 2023-01-25 PROCEDURE — 87486 CHLMYD PNEUM DNA AMP PROBE: CPT | Performed by: INTERNAL MEDICINE

## 2023-01-25 PROCEDURE — 89051 BODY FLUID CELL COUNT: CPT | Performed by: INTERNAL MEDICINE

## 2023-01-25 PROCEDURE — 31624 DX BRONCHOSCOPE/LAVAGE: CPT | Performed by: INTERNAL MEDICINE

## 2023-01-25 PROCEDURE — G0500 MOD SEDAT ENDO SERVICE >5YRS: HCPCS | Performed by: INTERNAL MEDICINE

## 2023-01-25 PROCEDURE — 250N000011 HC RX IP 250 OP 636: Performed by: INTERNAL MEDICINE

## 2023-01-25 PROCEDURE — 85610 PROTHROMBIN TIME: CPT | Performed by: PHYSICIAN ASSISTANT

## 2023-01-25 PROCEDURE — 88312 SPECIAL STAINS GROUP 1: CPT | Mod: TC | Performed by: INTERNAL MEDICINE

## 2023-01-25 PROCEDURE — 43235 EGD DIAGNOSTIC BRUSH WASH: CPT | Performed by: INTERNAL MEDICINE

## 2023-01-25 RX ORDER — NALOXONE HYDROCHLORIDE 0.4 MG/ML
0.2 INJECTION, SOLUTION INTRAMUSCULAR; INTRAVENOUS; SUBCUTANEOUS
Status: DISCONTINUED | OUTPATIENT
Start: 2023-01-25 | End: 2023-01-25 | Stop reason: HOSPADM

## 2023-01-25 RX ORDER — MAGNESIUM HYDROXIDE 1200 MG/15ML
LIQUID ORAL PRN
Status: DISCONTINUED | OUTPATIENT
Start: 2023-01-25 | End: 2023-01-25 | Stop reason: HOSPADM

## 2023-01-25 RX ORDER — ONDANSETRON 2 MG/ML
4 INJECTION INTRAMUSCULAR; INTRAVENOUS EVERY 6 HOURS PRN
Status: DISCONTINUED | OUTPATIENT
Start: 2023-01-25 | End: 2023-01-25 | Stop reason: HOSPADM

## 2023-01-25 RX ORDER — NALOXONE HYDROCHLORIDE 0.4 MG/ML
0.4 INJECTION, SOLUTION INTRAMUSCULAR; INTRAVENOUS; SUBCUTANEOUS
Status: DISCONTINUED | OUTPATIENT
Start: 2023-01-25 | End: 2023-01-25 | Stop reason: HOSPADM

## 2023-01-25 RX ORDER — ONDANSETRON 2 MG/ML
4 INJECTION INTRAMUSCULAR; INTRAVENOUS
Status: DISCONTINUED | OUTPATIENT
Start: 2023-01-25 | End: 2023-01-25 | Stop reason: HOSPADM

## 2023-01-25 RX ORDER — LIDOCAINE 40 MG/G
CREAM TOPICAL
Status: DISCONTINUED | OUTPATIENT
Start: 2023-01-25 | End: 2023-01-25 | Stop reason: HOSPADM

## 2023-01-25 RX ORDER — ONDANSETRON 4 MG/1
4 TABLET, ORALLY DISINTEGRATING ORAL EVERY 6 HOURS PRN
Status: DISCONTINUED | OUTPATIENT
Start: 2023-01-25 | End: 2023-01-25 | Stop reason: HOSPADM

## 2023-01-25 RX ORDER — FLUMAZENIL 0.1 MG/ML
0.2 INJECTION, SOLUTION INTRAVENOUS
Status: DISCONTINUED | OUTPATIENT
Start: 2023-01-25 | End: 2023-01-25 | Stop reason: HOSPADM

## 2023-01-25 RX ORDER — FENTANYL CITRATE 50 UG/ML
INJECTION, SOLUTION INTRAMUSCULAR; INTRAVENOUS PRN
Status: DISCONTINUED | OUTPATIENT
Start: 2023-01-25 | End: 2023-01-25 | Stop reason: HOSPADM

## 2023-01-25 RX ORDER — PROCHLORPERAZINE MALEATE 5 MG
10 TABLET ORAL EVERY 6 HOURS PRN
Status: DISCONTINUED | OUTPATIENT
Start: 2023-01-25 | End: 2023-01-25 | Stop reason: HOSPADM

## 2023-01-25 ASSESSMENT — ACTIVITIES OF DAILY LIVING (ADL)
ADLS_ACUITY_SCORE: 35

## 2023-01-25 NOTE — DISCHARGE INSTRUCTIONS
Post-Procedure Patient Instructions:    January 25, 2023  Sofie Rodriguez    Your procedure bronchoscopy with lavage was completed without any immediate complications.    You may cough up scant amount of blood for the next 12-24 hours. If you have excessive cough with blood, chest pain, shortness of breath or other concerning symptoms, please report to the closest emergency room.    You may experience low grade (less than 100.5 F) fever next 24 hours for which you can take Tylenol. If the fever persists more than 24 hours contact our office or your primary care provider.    You can resume your regular diet as it was prior to procedure.    You may resume your medications after the procedure unless you are instructed to do differently.     Should you have any question, please do not hesitate to call our office. [phone]    Our office (Thoracic/Pulmonary--[phone]) will call you with the results of any samples taken during the procedure.     Please note that you may get a result notification through  My Chart  before us calling you as the Laboratories are instructed to release the results as soon as they are available to the patients and providers at the same time. Please allow your provider 24 hours call you to discuss the results.      Neftali Ogden   Interventional pulmonary fellow

## 2023-01-25 NOTE — OP NOTE
Upper GI Endoscopy 01/25/2023 11:27 AM [address] [place] [address] [phone]     Endoscopy Department   _______________________________________________________________________________   Patient Name: Sofie Rodriguez            Procedure Date: 1/25/2023 11:27 AM   MRN: [id number]                       Account Number: [id number]   YOB: 1960               Admit Type: Outpatient   Age: 62                               Room: [place] [address]   Gender: Female                        Note Status: Finalized   Attending MD: OSKAR PÉREZ MD       Pause for the Cause: time out performed   Total Sedation Time:                     _______________________________________________________________________________       Procedure:             Upper GI endoscopy   Indications:           For therapy of gastroparesis; 62 year old female with                          a PMHx of end stage COPD s/p bilateral lung transplant                          on 6/28/22 complicated by acute limb ischemia of LUE                           s/p left radial thrombectomy, h/o C. Diff, h/o EBV                          viremia, ESRD on dialysis, hemobilia s/p ERCP                          presumably due to hemorrhage into gallbladder,                          referred for gastroparesis s/p PEGJ placement. History                          notable for a pyloric channel ulcer. Plan for a trial                          of Botox. GCSI= 3,0,0,3,2,2,3,4,4=21   Providers:             OSKAR PÉREZ MD, Jana Weller RN   Referring MD:             Requesting Provider:   MICHELE SOSA MD   Medicines:             Fentanyl 150 micrograms IV, Midazolam 4 mg IV   Complications:         No immediate complications. Estimated blood loss:                          Minimal.   _______________________________________________________________________________   Procedure:              Pre-Anesthesia Assessment:                          - Prior to the procedure, a History and Physical was                          performed, and patient medications and allergies were                          reviewed. The patient is competent. The risks and                          benefits of the procedure and the sedation options and                          risks were discussed with the patient. All questions                          were answered and informed consent was obtained.                          Patient identification and proposed procedure were                          verified by the physician and the nurse in the                          procedure room. Mental Status Examination: alert and                          oriented. Airway Examination: normal oropharyngeal                          airway and neck mobility. Respiratory Examination:                          clear to auscultation. CV Examination: normal.                          Prophylactic Antibiotics: The patient does not require                          prophylactic antibiotics. Prior Anticoagulants: The                          patient has taken no anticoagulant or antiplatelet                          agents. ASA Grade Assessment: III - A patient with                          severe systemic disease. After reviewing the risks and                          benefits, the patient was deemed in satisfactory                          condition to undergo the procedure. The anesthesia                          plan was to use moderate sedation / analgesia                          (conscious sedation). Immediately prior to                          administration of medications, the patient was                          re-assessed for adequacy to receive sedatives. The                          heart rate, respiratory rate, oxygen saturations,                          blood pressure, adequacy of pulmonary ventilation, and                           response to care were monitored throughout the                          procedure. The physical status of the patient was                          re-assessed after the procedure.                          After obtaining informed consent, the endoscope was                          passed under direct vision. Throughout the procedure,                          the patient's blood pressure, pulse, and oxygen                          saturations were monitored continuously. The Endoscope                          was introduced through the mouth, and advanced to the                          second part of duodenum. The upper GI endoscopy was                          accomplished without difficulty. The patient tolerated                          the procedure well.                                                                                     Findings:        The esophagus was normal.        There was evidence of an intact gastrostomy with a patent GJ-tube        present in the gastric antrum extending into the duodenum. This was        characterized by healthy appearing mucosa.        Localized nodular mucosa was found at the pylorus at the 11 o'clock        position corresponding to the location of her previously noted pyloric        channel ulcer.        Pylorus was otherwise normal. Pylorus was successfully injected with 200        units botulinum toxin divided into four quadrants in a total of 4 mL.        Estimated blood loss was minimal.        The examined duodenum was normal.                                                                                     Moderate Sedation:        Moderate (conscious) sedation was administered by the endoscopy nurse        and supervised by the endoscopist. The patient's oxygen saturation,        heart rate, blood pressure and response to care were monitored. Total        physician intraservice time was 10 minutes.   Impression:            - Normal esophagus.                           - PEGJ tube in place                          - Nodular mucosa at the pylorus at the 11 o'clock                          position corresponding to the location of her                          previously noted pyloric channel ulcer consistent with                          granulation tissue. Ulcer no longer present                          - Pylorus injected with 200 units of Botox                          - Normal examined duodenum.                          - Today's GCSI= 3,0,0,3,2,2,3,4,4=21                          - No specimens collected.   - Tandem Bronchoscopy procedure performed by Dr. Reddy after EGD. See separate note.  Recommendation:        - Discharge patient to home (ambulatory).                          - Resume diet as tolerated today                          - GJ tube may be used immediately as before                          - In the future, G-POEM may still be possible along                          the 5 o'clock postion of the pylorus but may still run                          into significant scar tissue from prior ulceration.                          - Return to clinic in 1 month with Dr. Navarro to assess                          response.                                                                                       Gonzalez Navarro MD

## 2023-01-25 NOTE — TELEPHONE ENCOUNTER
Called [facility] to check on overnight oximetry study. They said they will be dropping off kit tomorrow to patient

## 2023-01-26 ENCOUNTER — DOCUMENTATION ONLY (OUTPATIENT)
Dept: TRANSPLANT | Facility: CLINIC | Age: 63
End: 2023-01-26
Payer: MEDICARE

## 2023-01-26 ENCOUNTER — TELEPHONE (OUTPATIENT)
Dept: TRANSPLANT | Facility: CLINIC | Age: 63
End: 2023-01-26
Payer: MEDICARE

## 2023-01-26 ENCOUNTER — TRANSFERRED RECORDS (OUTPATIENT)
Dept: HEALTH INFORMATION MANAGEMENT | Facility: CLINIC | Age: 63
End: 2023-01-26
Payer: MEDICARE

## 2023-01-26 LAB
CMV DNA IU/ML, INSTRUMENT: 3096 IU/ML
CMV DNA SPEC NAA+PROBE-LOG#: 3.5 {LOG_COPIES}/ML

## 2023-01-26 NOTE — TELEPHONE ENCOUNTER
"Called Sofie to assess cycled TF. She discharged from the hospital on an 18 hour cycle.   Per 10/6/22 RD note, TF regimen was Nepro @ 55 ml/hr x 18 hours via J tube (990 ml formula, 1782 calories, 80 g protein). 4 cartons/night. She is tolerating her feeds well. She is not taking much by mouth except for a bit of water, some milk, and \"pleasure bites\". Now after yesterday's procedure, doctor encouraged her to try eating. She reports minimal PO since September. Weight stable     Interventions:  - Shorten cycle to 12 hours. If sticking with 4 cartons, this will be run at 80 ml/hr. She reports some issue back in the hospital when rate was increased.   - Recommend she increase to 65 or 70 ml/hr tonight x 12 hours and if well tolerated, increase to 80 ml/hr tomorrow night. If tolerance is uncertain, stay at 65-70 ml/hr x few days.   - Will my chart Sofie in the next few days to assess how things are going    "

## 2023-01-26 NOTE — RESULT ENCOUNTER NOTE
Bronchial lavage positive for CMV, and not normally treated, will continue to monitor cultures.  Serum sample test of CMV negative on 1-24-23.

## 2023-01-26 NOTE — PROGRESS NOTES
CMV serum on 1-24-23 was negative.  Bronchial lavage positive for CMV at 3096 international unit(s), however, CMV present in lavage is not uncommon and not routinely treated.  Will continue to monitor cultures.

## 2023-01-27 ENCOUNTER — PATIENT OUTREACH (OUTPATIENT)
Dept: GASTROENTEROLOGY | Facility: CLINIC | Age: 63
End: 2023-01-27
Payer: MEDICARE

## 2023-01-27 LAB — BACTERIA BRONCH: NORMAL

## 2023-01-27 NOTE — PROGRESS NOTES
Called pt to discuss follow up from botox procedure. Pt reports feeling well, thinks she over ate shortly after procedure and will be going a little slower.  Virtual visit arranged for 3/1 at 11am. Pt will need gastroparesis questionairre filled out just prior to, will send via [employer] as date approaches.   Pt verbalized understanding of follow up.    Penny Vogel, RN, BSN,   [facility]  Care coordinator

## 2023-01-29 NOTE — PROCEDURES
INTERVENTIONAL PULMONOLOGY       Procedure(s):    A flexible bronchoscopy  Airway exam  BAL      Indication: Lung transplant    Attending of Record:  MD Garth Cornejo MD    Interventional Pulmonary Fellow   Kristen Ogden MD     Trainees Present:   None     Medications:    See flowsheet    Sedation Time:   15 minutes    Time Out:  Performed    The patient's medical record has been reviewed.  The indication for the procedure was reviewed.  The necessary history and physical examination was performed and reviewed.  The risks, benefits and alternatives of the procedure were discussed with the the patient in detail and she had the opportunity to ask questions.  I discussed in particular the potential complications including risks of minor or life-threatening bleeding and/or infection, respiratory failure, vocal cord trauma / paralysis, pneumothorax, and discomfort. Sedation risks were also discussed including abnormal heart rhythms, low blood pressure, and respiratory failure. All questions were answered to the best of my ability.  Verbal and written informed consent was obtained.  The proposed procedure and the patient's identification were verified prior to the procedure by the physician and the nurse, respiratory therapist.    The patient was assessed for the adequacy for the procedure and to receive medications.   Mental Status:  Alert and oriented x 3  Airway examination:  Class III (Visualization of only the base of uvula)  Pulmonary:  Decreased breathsound throughout  CV:  RRR, no murmurs or gallops  ASA Grade:  (II)  Mild systemic disease    After clinical evaluation and reviewing the indication, risks, alternatives and benefits of the procedure the patient was deemed to be in satisfactory condition to undergo the procedure.      Immediately before administration of medications the patient was re-assessed for adequacy to receive sedatives including the heart rate, respiratory rate, mental status,  oxygen saturation, blood pressure and adequacy of pulmonary ventilation. These same parameters were continuously monitored throughout the procedure.    A Tuberculosis risk assessment was performed:  The patient has no known RISK of Tuberculosis    The procedure was performed in a negative airflow room: Yes    Maneuvers / Procedure:      A Flexible  bronchoscope was used for the procedure. Anastomoses patent.    Airway Examination: A complete airway examination was performed from the distal trachea to the subsegmental level in each lobe of both lungs.        BAL: The bronchoscope was wedged into the RML bronchus. A total of 120cc of saline was instilled and a total of 40 ml was aspirated. This was sent for analysis.     Any disposable equipment was visually inspected and deemed to be intact immediately post procedure.      Relevant Pictures      Recommendations:     --> Results will be forwarded to the transplant team.  Images of anastomoses in probation.      Mason Reddy MD

## 2023-02-06 DIAGNOSIS — N18.32 ANEMIA OF CHRONIC RENAL FAILURE, STAGE 3B (H): ICD-10-CM

## 2023-02-06 DIAGNOSIS — D63.1 ANEMIA OF CHRONIC RENAL FAILURE, STAGE 3B (H): ICD-10-CM

## 2023-02-06 RX ORDER — GUAR GUM
1 PACKET (EA) ORAL
Qty: 75 EACH | Refills: 3 | Status: SHIPPED | OUTPATIENT
Start: 2023-02-06 | End: 2023-06-07

## 2023-02-10 ENCOUNTER — LAB (OUTPATIENT)
Dept: LAB | Facility: CLINIC | Age: 63
End: 2023-02-10
Payer: MEDICARE

## 2023-02-10 DIAGNOSIS — Z94.2 LUNG REPLACED BY TRANSPLANT (H): ICD-10-CM

## 2023-02-10 PROCEDURE — 80197 ASSAY OF TACROLIMUS: CPT

## 2023-02-11 LAB
TACROLIMUS BLD-MCNC: 8.6 UG/L (ref 5–15)
TME LAST DOSE: NORMAL H
TME LAST DOSE: NORMAL H

## 2023-02-13 NOTE — TELEPHONE ENCOUNTER
Contacted patient and introduced myself as their Transplant Coordinator, also introduced the role of the Transplant Coordinator in the transplant process.  Explained the purpose of this call including reviewing next steps and answering questions.    Confirmed Referring Provider, Dialysis Center, and Primary Care Physician. Notified patient of the importance of continued communication with referring providers and primary care physicians.    Reviewed components of transplant evaluation process including necessary appointments, tests, and procedures.    Answered questions for patient regarding evaluation, provided my name and contact information and requested they call with any additional questions.    Determined that patient would like additional information regarding transplant by:     Drop Down choices: Mail, Email, MyChart, Phone Call  Patient confirmed she will attend pre kidney eval virtual appts on Tuesday March 7th.  Explained a  will send her schedule soon via My Chart. Instructed pt she will need to attend in person kidney transplant surgeon appt after that and possibly other appts. Instructed pt on use of [facility] and to view pre kidney eval parts 1 and 2. Instructed pt she will receive an email from Caterina teran 1 week prior to eval with Receipt of Info and educational materials, instructed her to sign consent and read materials prior to eval appts. Instructed her on use of www.unos.oeg and www.srtr.org. Pt expressed excellent understanding of all and was in good agreement with the plan.   Smart set orders into [facility] today for pre kidney transplant eval.

## 2023-02-15 DIAGNOSIS — N18.6 ESRD (END STAGE RENAL DISEASE) (H): Primary | ICD-10-CM

## 2023-02-15 RX ORDER — ASCORBIC ACID, THIAMINE, RIBOFLAVIN, NIACINAMIDE, PYRIDOXINE, FOLIC ACID, COBALAMIN, BIOTIN, PANTOTHENIC ACID 100; 1.5; 1.7; 20; 10; 1; 6; 300; 1 MG/1; MG/1; MG/1; MG/1; MG/1; MG/1; UG/1; UG/1; MG/1
1 TABLET, COATED ORAL DAILY
Qty: 30 TABLET | Refills: 11 | Status: SHIPPED | OUTPATIENT
Start: 2023-02-15 | End: 2023-04-17

## 2023-02-16 ENCOUNTER — TELEPHONE (OUTPATIENT)
Dept: PHARMACY | Facility: CLINIC | Age: 63
End: 2023-02-16
Payer: MEDICARE

## 2023-02-16 NOTE — TELEPHONE ENCOUNTER
Clinical Pharmacy Consult:                                                      Transplant Specific: 6 Month Post Transplant Call   Date of Transplant: 6/28/2022  Type of Transplant: lung  First Transplant: yes  History of rejection: no    Immunosuppression Regimen   TAC 3.mg qAM & 3mg qPM, Prednisone 5mg qAM & 2.5mg qPM and AZA 100mg daily  Patient specific goal: 8-12  Most recent level: 8.6, date 2/10/23  Immunosuppressant Levels:  Therapeutic  Pt adherent to lab draws: yes  Scr:   Lab Results   Component Value Date    CR 3.68 11/23/2022    CR 0.85 06/30/2021     Side effects: diarrhea  May be from tube feedings    Prophylactic Medications  Antibacterial:  Dapsone 50mg 3 times per week  Scheduled Discontinue Date: Lifelong    Antifungal: Nystatin  Scheduled Discontinue Date: therapy complete    Antiviral: CrCl 10 to 24 mL/minute: Valcyte 450 mg twice weekly   Scheduled Discontinue Date: Therapy Complete    Acid Reducer: Protonix (pantoprazole)  Scheduled Reviewed Date: Lifelong    Thrombosis Prevention: Not on  Scheduled Discontinue Date: N/A    Blood Pressure Management  Frequency of home Blood Pressure checks: twice daily  Most recent home BP: 133/78  Patient Blood pressure goal: <140/90  Patient blood pressure at goal:  yes  Hospitalizations/ER visits since last assessment: 0    Med rec/DUR performed: yes  Med Rec Discrepancies: no    Medication adherence flowsheet 2/16/2023   Patient medication administration: Responsible for own medications   Patient estimated adherence level: %   Pharmacist assessment of adherence: Good   Patient reported doses missed per week: 0-1   Pharmacy MPR: -   Facilitators to medication adherence  Cell phone;Medication dosing chart;Pill box;Schedule/routine   Patient reported barriers to medication adherence  -   Adherence intervention(s): -      Medication access flowsheet 2/16/2023   Number of pharmacies used: 2   Pharmacy: [facility];Other   Other pharmacy: [facility]  retail   Enrolled in [facility]? Yes   Patient reported barriers to accessing medications: -   Medication access interventions: -          Sofie reports feeling really good!  She has diarrhea that may be from tube feedings and takes loperamide twice daily scheduled morning and evening.  She is already on azathioprine and Nutrisource fiber.      She uses her pill box, phone alarms, and med card.  She has not been late or missed any doses since transplant.  Last tacro level was at goal.    Blood pressure:  Checks twice daily.  This morning it was 133/78.  She said it is most often in the 120's    No other questions or concerns today.  Follow up in 6 months.    Gino Ferro [facility]  Specialty Pharmacist [phone]

## 2023-02-20 ENCOUNTER — DOCUMENTATION ONLY (OUTPATIENT)
Dept: GASTROENTEROLOGY | Facility: CLINIC | Age: 63
End: 2023-02-20
Payer: MEDICARE

## 2023-02-20 PROBLEM — J18.9 LUNG INFECTION: Status: ACTIVE | Noted: 2022-11-30

## 2023-02-20 NOTE — PROGRESS NOTES
Called PT and left VM.    Called to remind patient of their upcoming appointment with our GI clinic, on 03/01/23 at 11:00 AM with Dr. Gonzalez Navarro. This appointment is scheduled as a video visit. You will receive a call approximately 30 minutes prior to check you in, you must be in [place] for this visit., if your appointment is virtual (video or telephone) you need to be in [place] for the visit. To reschedule or cancel patient to call [phone].      SK

## 2023-02-21 NOTE — PROGRESS NOTES
Called PT and confirmed Appt.     Called to remind patient of their upcoming appointment with our GI clinic, on 03/01/23 at 11:00 AM with Dr. Gonzalez Navarro. This appointment is scheduled as a video visit. You will receive a call approximately 30 minutes prior to check you in, you must be in [place] for this visit., if your appointment is virtual (video or telephone) you need to be in [place] for the visit. To reschedule or cancel patient to call [phone].        SK

## 2023-02-22 ENCOUNTER — TELEPHONE (OUTPATIENT)
Dept: TRANSPLANT | Facility: CLINIC | Age: 63
End: 2023-02-22
Payer: MEDICARE

## 2023-02-22 LAB
BACTERIA BRONCH: NO GROWTH
BACTERIA BRONCH: NO GROWTH

## 2023-02-23 ENCOUNTER — TELEPHONE (OUTPATIENT)
Dept: TRANSPLANT | Facility: CLINIC | Age: 63
End: 2023-02-23
Payer: MEDICARE

## 2023-02-23 NOTE — TELEPHONE ENCOUNTER
Called Ladan draper to see if there were any updates on overnight oximetry study done 1/27.     No call back from manager that was requested two days ago.     Ladan said RT will download results today and will be faxing results to transplant office tomorrow. Confirmed correct fax number.

## 2023-02-24 ENCOUNTER — TELEPHONE (OUTPATIENT)
Dept: TRANSPLANT | Facility: CLINIC | Age: 63
End: 2023-02-24

## 2023-02-24 ENCOUNTER — LAB (OUTPATIENT)
Dept: LAB | Facility: CLINIC | Age: 63
End: 2023-02-24
Payer: MEDICARE

## 2023-02-24 ENCOUNTER — DOCUMENTATION ONLY (OUTPATIENT)
Dept: GASTROENTEROLOGY | Facility: CLINIC | Age: 63
End: 2023-02-24
Payer: MEDICARE

## 2023-02-24 DIAGNOSIS — Z94.2 LUNG REPLACED BY TRANSPLANT (H): ICD-10-CM

## 2023-02-24 DIAGNOSIS — Z00.6 RESEARCH STUDY PATIENT: Primary | ICD-10-CM

## 2023-02-24 LAB
TACROLIMUS BLD-MCNC: 10.1 UG/L (ref 5–15)
TME LAST DOSE: NORMAL H
TME LAST DOSE: NORMAL H

## 2023-02-24 PROCEDURE — 80197 ASSAY OF TACROLIMUS: CPT

## 2023-02-24 NOTE — PROGRESS NOTES
PO folic acid supplement discontinued. Pt is on Dialycvite which has the same amount of folic acid as supplement.

## 2023-02-24 NOTE — TELEPHONE ENCOUNTER
Sofie de jesus in regards to her appointment on 3/7/23 Video visit at 815am patient stated to this writer that she forgot she had something else going on at that time wondering can she either have a later call or reschedule just the video visit at 815 am

## 2023-02-24 NOTE — PROGRESS NOTES
Called [facility] to request that they push images to [facility].    Images Requested:  XR Abdomen One View - 12/09/2022  XR Fluro Less Than One Hour - 12/02/2022    [facility]  [address] [place] [address]    Phone #: [phone] sk

## 2023-02-27 NOTE — RESULT ENCOUNTER NOTE
Ben Carrizales,   Your tacrolimus level was 10.1 at 12 hours on 2/24/23 which is within your goal range of 8-12. No dose change at this time. Please call the transplant office ([phone]) with any questions.   Thanks,   Lacey

## 2023-02-27 NOTE — PROGRESS NOTES
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 diff colitis, gastroparesis s/p GJ tube placement now s/p EGD and botox, GI bleed 2/2 pyloric ulcer, hemobilia s/p ERCP and MRCP, ESRD on iHD, pneumonia and influenza A who is seen for routine follow up. Other history notable for HFpEF, NTM colonoization, hepatitis C, and methamphetamine use.     1. S/p bilateral lung transplant:  Persistent bibasilar pleural effusions:   Right hemidiaphragm palsy: overall feeling well, continues with pulmonary rehab with improving endurance. Sating 96% on room air. S/p bronch on 1/25, no biopsies, BAL with no growth. Repeated overnight O2 study on room air and requires O2, also with multiple desaturation events. CMV 1/24 negative. CXR reviewed today and demonstrates small left effusion vs. scarring. PFTs stable with FEV1 of 1.5L.   - On three drug IS including zathioprine 100 mg daily, tacrolimus (goal 8-12) and prednisone  - Dapsone qMWF for PJP ppx (Bactrim stopped d/t hyperkalemia; Pentamidine neb not tolerated)  - Wear 2L O2 at night, will set patient up with [facility]    2. 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   - DSA pending    3. EBV viremia: last level of 3K (log 3.5) on 1/24.   - EBV pending      4. Hypogammaglobulinemia: IgG adequate at time of transplant, repeat level low (336) on 7/28. S/p IVIG 7/30. Last IgG of 844 on 11/11/22.      5. ESRD based upon cystatin C (GFR of 10): s/p tunneled line placement and HD initiated 9/26 on T/Th/Sat schedule. Now with LUE fistula X 2 weeks. Notes she needs to move her appointment with Nephrology to discuss possible transplant.      6. Diarrhea: stable, likely exacerbated by  TF.   - Continue fiber and imodium PRN    7. Severe gastroparesis:   Pyloric ulcer: now s/p EGD with botox by Dr. Navarro, still can only eat very small portions of food. Continues with TF overnight for 12 hours. Has follow up with him next week.  - PPI BID  - TC cycled feeds    RTC: 1 month with bronch  Vaccinations: flu shot and bivalent covid vaccine  Annual dermatology visit: will see Derm in May  Preventative: DEXA > June 2023; colonoscopy in 2025, mammogram completed two weeks ago    Kaylin Triana PA-C  Pulmonary, Allergy, Critical Care and Sleep Medicine        Interval History:     Still doing pulmonary rehab twice per week and has some weights for home. No new or expected shortness of breath, minimal cough, no fever or chills. No nasal or sinus congestion, no chest pain or palpitations. Since the botox, patient feels like she can eat a little bit more, but cannot eat a regular meal. Still doing cycled TF, 12 hours per day. Bloating with eating the wrong food or too much, rare nausea, but no vomiting. Stools are mainly loose. Doesn't drink a lot of fluids, flushes her tube in the morning of dialysis and with meds.          Review of Systems:   Please see HPI, otherwise the complete 10 point ROS is negative.           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     Hepatitis 2017    Hep C, [facility]     HTN (hypertension)      Lung infection 11/30/2022     Osteopenia      Past Surgical History:   Procedure Laterality Date     BRONCHOSCOPY (RIGID OR FLEXIBLE), DIAGNOSTIC N/A 8/2/2022    Procedure: BRONCHOSCOPY, DIAGNOSTIC- inspection Bronch;  Surgeon: Kamala Lovell MD;  Location:  GI     BRONCHOSCOPY (RIGID OR FLEXIBLE), DIAGNOSTIC N/A 9/13/2022    Procedure: INSPECTION BRONCHOSCOPY, WITH  BRONCHOALVEOLAR LAVAGE;  Surgeon: Jose R Mccullough MD;  Location:  GI     BRONCHOSCOPY (RIGID OR FLEXIBLE), DIAGNOSTIC N/A 11/9/2022    Procedure: BRONCHOSCOPY, WITH BRONCHOALVEOLAR LAVAGE AND BIOPSY;  Surgeon: Cesar Lima MD;  Location:  GI     BRONCHOSCOPY (RIGID OR FLEXIBLE), DIAGNOSTIC N/A 1/25/2023    Procedure: BRONCHOSCOPY, WITH BRONCHOALVEOLAR LAVAGE AND BIOPSY;  Surgeon: Mason Reddy MD;  Location:  GI     BRONCHOSCOPY FLEXIBLE AND RIGID N/A 07/19/2022    Procedure: BRONCHOSCOPY inspection only;  Surgeon: Bob Liao MD;  Location:  GI     COLONOSCOPY  2015     CV CORONARY ANGIOGRAM N/A 06/30/2021    Procedure: CV CORONARY ANGIOGRAM;  Surgeon: Alexander Cuellar MD;  Location:  HEART CARDIAC CATH LAB     CV RIGHT HEART CATH MEASUREMENTS RECORDED N/A 06/30/2021    Procedure: CV RIGHT HEART CATH;  Surgeon: Alexander Cuellar MD;  Location:  HEART CARDIAC CATH LAB     ENDOSCOPIC RETROGRADE CHOLANGIOPANCREATOGRAM N/A 8/11/2022    Procedure: ENDOSCOPIC RETROGRADE CHOLANGIOPANCREATOGRAPHY WITH PANCREATIC DUCT NEEDLE KNIFE AND STENT PLACEMENT, BILE DUCT SPHINCTEROTOMY, BLOOD/DEBRIS REMOVAL AND STENT PLACEMENT;  Surgeon: Cosmo Arroyo MD;  Location:  OR     ENDOSCOPIC RETROGRADE CHOLANGIOPANCREATOGRAM N/A 10/7/2022    Procedure: ENDOSCOPIC RETROGRADE CHOLANGIOPANCREATOGRAPHY with biliary and pancreatic stent removal, debris removal;  Surgeon: Cosmo Arroyo MD;  Location:  OR     ENT SURGERY  1974    tonsillectomy     ENTEROSCOPY SMALL BOWEL N/A 8/11/2022    Procedure: SMALL BOWEL ENTEROSCOPY;  Surgeon: Cosmo Arroyo MD;  Location:  OR     ESOPHAGOGASTRODUODENOSCOPY, WITH NASOGASTRIC TUBE INSERTION N/A 07/01/2022    Procedure: ESOPHAGOGASTRODUODENOSCOPY, WITH NASOJEJUNAL TUBE INSERTION;  Surgeon: Ozzy Nickerson MD;  Location:  GI     ESOPHAGOSCOPY, GASTROSCOPY, DUODENOSCOPY (EGD), COMBINED N/A 8/3/2022    Procedure: ESOPHAGOGASTRODUODENOSCOPY  (EGD);  Surgeon: Ira Andres MD;  Location: [facility] GI     ESOPHAGOSCOPY, GASTROSCOPY, DUODENOSCOPY (EGD), COMBINED N/A 1/25/2023    Procedure: ESOPHAGOGASTRODUODENOSCOPY (EGD) with botox injection;  Surgeon: Gonzalez Navarro MD;  [facility]     HAND SURGERY       INSERT CHEST TUBE Right 9/13/2022    Procedure: Insert chest tube;  Surgeon: Jose R Mccullough MD;  Location: [facility]     IR CVC TUNNEL PLACEMENT > 5 YRS OF AGE  9/26/2022     IR GASTRO JEJUNOSTOMY TUBE CHANGE  8/31/2022     IR GASTRO JEJUNOSTOMY TUBE CHANGE  12/21/2022     IR GASTRO JEJUNOSTOMY TUBE PLACEMENT  7/27/2022     IR THORACENTESIS  8/29/2022     LEEP TX, CERVICAL  04/07/2017    HECTOR III     LYMPH NODE BIOPSY Left 2005    Left axilla, benign- [place]     MIDLINE INSERTION - DOUBLE LUMEN Left 07/28/2022    20cm, Basilic vein     REPLACE GASTROJEJUNOSTOMY TUBE, PERCUTANEOUS  10/7/2022    Procedure: Replace Gastrojejunostomy Tube;  Surgeon: Cosmo Arroyo MD;  Location: [facility]     THORACENTESIS Left 8/29/2022    Procedure: THORACENTESIS;  Surgeon: Bo Capone PA-C;  Location: [facility] OR     THORACENTESIS Left 9/13/2022    Procedure: Thoracentesis;  Surgeon: Jose R Mccullough MD;  Location: [facility]     THROMBECTOMY UPPER EXTREMITY Left 07/02/2022    Procedure: LEFT RADIAL ARM THROMBECTOMY;  Surgeon: Christie Graham MD;  Location: [facility]     TRANSPLANT LUNG RECIPIENT SINGLE X2 Bilateral 06/28/2022    Procedure: Clamshell Incision, Bilateral Sequential Lung Transplant, On Cardiopulmonary Bypass, Flexible Bronchoscopy;  Surgeon: Sue Sunshine MD;  Location: [facility] OR           Family History:     No family history on file.         Social History:     Social History     Socioeconomic History     Marital status: Single     Spouse name: Not on file     Number of children: Not on file     Years of education: Not on file     Highest education level: Not on file   Occupational History     Not on file   Tobacco Use     Smoking  status: Former     Years: 30.00     Types: Cigarettes     Quit date: 2020     Years since quittin.3     Smokeless tobacco: Never   Substance and Sexual Activity     Alcohol use: Not Currently     Drug use: Not Currently     Types: Marijuana, Methamphetamines     Comment: hx:marijuana and methamphetamine-quit both unsure ?  2-3 yrs ago     Sexual activity: Not on file   Other Topics Concern     Parent/sibling w/ CABG, MI or angioplasty before 65F 55M? Not Asked   Social History Narrative     Not on file     Social Determinants of Health     Financial Resource Strain: Not on file   Food Insecurity: Not on file   Transportation Needs: Not on file   Physical Activity: Not on file   Stress: Not on file   Social Connections: Not on file   Intimate Partner Violence: Not on file   Housing Stability: Not on file            Medications:     Current Outpatient Medications   Medication     alcohol swab prep pads     azaTHIOprine (IMURAN) 5 mg/mL SUSP     B Complex-C-Folic Acid (DIALYVITE) TABS     blood glucose (CONTOUR NEXT TEST) test strip     blood glucose (NO BRAND SPECIFIED) lancets standard     blood glucose monitoring (CONTOUR NEXT MONITOR W/DEVICE KIT) meter device kit     calcium carbonate 600 mg-vitamin D 400 units (CALTRATE) 600-400 MG-UNIT per tablet     cyanocobalamin (CYANOCOBALAMIN) 500 MCG tablet     dapsone 2 mg/mL SUSP     Guar Gum (FIBER MODULAR, NUTRISOURCE FIBER,) packet     insulin pen needle (32G X 4 MM) 32G X 4 MM miscellaneous     loperamide (IMODIUM A-D) 2 MG tablet     metoprolol tartrate (LOPRESSOR) 50 MG tablet     Nutritional Supplements (GLUCOSE MANAGEMENT) TABS     pantoprazole (PROTONIX) 2 mg/mL SUSP suspension     predniSONE (DELTASONE) 5 MG tablet     protein modular (PROSOURCE TF) LIQD     Sharps Container MISC     tacrolimus (GENERIC EQUIVALENT) 1 mg/mL suspension     No current facility-administered medications for this visit.            Physical Exam:   /71   Pulse 83    SpO2 96%     GENERAL: alert, NAD  HEENT: NCAT, EOMI, no scleral icterus, oral mucosa moist and without lesions  Neck: no cervical or supraclavicular adenopathy  Lungs: moderate airflow, decreased in bases   CV: RRR, S1S2, + murmur  Abdomen: normoactive BS, soft, non tender; GJ tube in place  Lymph: no edema  Neuro: AAO X 3, CN 2-12 grossly intact  Psychiatric: normal affect, good eye contact  Skin: no rash, jaundice or lesions on limited exam         Data:   All laboratory and imaging data reviewed.      Recent Results (from the past 168 hour(s))   Tacrolimus by [employer] Mass Spectrometry    Collection Time: 02/24/23  8:22 AM   Result Value Ref Range    Tacrolimus by [employer] Mass Spectrometry 10.1 5.0 - 15.0 ug/L    Tacrolimus Last Dose Date 2/23/2023     Tacrolimus Last Dose Time  8:00 AM    Basic metabolic panel    Collection Time: 02/24/23  8:39 AM   Result Value Ref Range    Sodium (External) 138 136 - 146 mmol/L    Potassium (External) 4.5 3.5 - 5.1 mmol/L    Chloride (External) (External) 96 (L) 98 - 107 mmol/L    CO2 (External) 31 (H) 22 - 29 mmol/L    Anion Gap (External) 15.5 10.0 - 20.0 mmol/L    Creatinine (External) 4.55 mg/dL    Urea Nitrogen (External) 41.4 (H) 10.0 - 20.0 mg/dL    BUN/Creatinine Ratio (External) 9.10 (L) 11.70 - 22.90 ratio    Calcium (External) 9.9 8.6 - 10.5 mg/dL    Glucose (External) 83 70 - 100 mg/dL    GFR Estimated (External) 10 (L) >=60 ml/min/1.73m2   Magnesium    Collection Time: 02/24/23  8:39 AM   Result Value Ref Range    Magnesium (External) 2.5 1.8 - 2.6 mg/dL   CBC with Platelets & Differential    Collection Time: 02/24/23  8:39 AM   Result Value Ref Range    % Neutrophils (External) 61.0 43.0 - 80.0 %    % Bands (External) 8.0 (H) 0.0 - 6.0 %    % Lymphocytes (External) 16.0 16.0 - 49.0 %    % Monocytes (External) 8.0 0.0 - 10.0 %    % Eosinophils (External) 7.0 0.0 - 7.0 %    Absolute Neutrophils (External) 2.6 1.8 - 9.2 10(3)/uL    Absolute Bands (External) 0.3  10(3)/uL    Absolute Lymphocytes (External) 0.7 (L) 1.2 - 3.9 10(3)/uL    Absolute Monocytes (External) 0.3 0.0 - 1.1 10(3)/uL    Absolute Eosinophils (External) 0.3 0.0 - 0.8 10(3)/uL    WBC Count (External) 4.2 Not provided 10(3)/uL    RBC Count (External) 3.06 Not provided 10(6)/uL    Hemoglobin (External) 10.8 Not provided g/dL    Hematocrit (External) 32.4 Not provided %    MCV (External) 106.0 Not provided fL    MCH (External) 35.4 Not provided pg    MCHC (External) 33.3 32.0 - 36.0 g/dL    RDW (External) 16.8 Not provided %    Platelet Count (External) 227 179 - 450 10(3)/uL   General PFT Lab (Please always keep checked)    Collection Time: 02/28/23 11:46 AM   Result Value Ref Range    FVC-Pred 2.81 L    FVC-Pre 1.54 L    FVC-%Pred-Pre 54 %    FEV1-Pre 1.51 L    FEV1-%Pred-Pre 67 %    FEV1FVC-Pred 80 %    FEV1FVC-Pre 98 %    FEFMax-Pred 6.14 L/sec    FEFMax-Pre 5.19 L/sec    FEFMax-%Pred-Pre 84 %    FEF2575-Pred 2.11 L/sec    FEF2575-Pre 3.70 L/sec    OFM0368-%Pred-Pre 175 %    ExpTime-Pre 4.21 sec    FIFMax-Pre 4.01 L/sec    FEV1FEV6-Pred 81 %    FEV1FEV6-Pre 98 %   Basic metabolic panel    Collection Time: 02/28/23  1:04 PM   Result Value Ref Range    Sodium 137 136 - 145 mmol/L    Potassium 4.4 3.4 - 5.3 mmol/L    Chloride 96 (L) 98 - 107 mmol/L    Carbon Dioxide (CO2) 29 22 - 29 mmol/L    Anion Gap 12 7 - 15 mmol/L    Urea Nitrogen 25.8 (H) 8.0 - 23.0 mg/dL    Creatinine 2.96 (H) 0.51 - 0.95 mg/dL    Calcium 9.6 8.8 - 10.2 mg/dL    Glucose 117 (H) 70 - 99 mg/dL    GFR Estimate 17 (L) >60 mL/min/1.73m2   Magnesium    Collection Time: 02/28/23  1:04 PM   Result Value Ref Range    Magnesium 2.3 1.7 - 2.3 mg/dL   CBC with platelets    Collection Time: 02/28/23  1:04 PM   Result Value Ref Range    WBC Count 4.2 4.0 - 11.0 10e3/uL    RBC Count 3.00 (L) 3.80 - 5.20 10e6/uL    Hemoglobin 10.5 (L) 11.7 - 15.7 g/dL    Hematocrit 33.1 (L) 35.0 - 47.0 %     (H) 78 - 100 fL    MCH 35.0 (H) 26.5 - 33.0 pg     MCHC 31.7 31.5 - 36.5 g/dL    RDW 16.4 (H) 10.0 - 15.0 %    Platelet Count 245 150 - 450 10e3/uL   EKG 12-lead, tracing only [EKG1]    Collection Time: 02/28/23  3:55 PM   Result Value Ref Range    Systolic Blood Pressure  mmHg    Diastolic Blood Pressure  mmHg    Ventricular Rate 77 BPM    Atrial Rate 77 BPM    MI Interval 134 ms    QRS Duration 74 ms     ms    QTc 448 ms    P Axis 55 degrees    R AXIS 7 degrees    T Axis 50 degrees    Interpretation ECG       Sinus rhythm  Left atrial enlargement  Borderline ECG  When compared with ECG of 14-SEP-2022 21:51,  No significant change was found  Confirmed by MD MOISES, SERA ([id number]) on 3/1/2023 9:13:28 AM     Echocardiogram Complete    Collection Time: 02/28/23  4:26 PM   Result Value Ref Range    LVEF  55-60%      PFT interpretation:  Maneuver: valid and met ATS guidelines, but only for repeatability  Mild obstruction with Z score -2.16  Compared to prior: FEV1 of 1.51 is 10 ml above prior

## 2023-02-28 ENCOUNTER — ANCILLARY PROCEDURE (OUTPATIENT)
Dept: CARDIOLOGY | Facility: CLINIC | Age: 63
End: 2023-02-28
Attending: NURSE PRACTITIONER
Payer: MEDICARE

## 2023-02-28 ENCOUNTER — LAB (OUTPATIENT)
Dept: LAB | Facility: CLINIC | Age: 63
End: 2023-02-28
Attending: PHYSICIAN ASSISTANT
Payer: MEDICARE

## 2023-02-28 ENCOUNTER — OFFICE VISIT (OUTPATIENT)
Dept: PULMONOLOGY | Facility: CLINIC | Age: 63
End: 2023-02-28
Attending: PHYSICIAN ASSISTANT
Payer: MEDICARE

## 2023-02-28 ENCOUNTER — ANCILLARY PROCEDURE (OUTPATIENT)
Dept: GENERAL RADIOLOGY | Facility: CLINIC | Age: 63
End: 2023-02-28
Attending: PHYSICIAN ASSISTANT
Payer: MEDICARE

## 2023-02-28 VITALS — OXYGEN SATURATION: 96 % | HEART RATE: 83 BPM | SYSTOLIC BLOOD PRESSURE: 121 MMHG | DIASTOLIC BLOOD PRESSURE: 71 MMHG

## 2023-02-28 DIAGNOSIS — N18.32 ANEMIA OF CHRONIC RENAL FAILURE, STAGE 3B (H): ICD-10-CM

## 2023-02-28 DIAGNOSIS — Z94.2 S/P LUNG TRANSPLANT (H): Chronic | ICD-10-CM

## 2023-02-28 DIAGNOSIS — Z01.818 PRE-TRANSPLANT EVALUATION FOR KIDNEY TRANSPLANT: ICD-10-CM

## 2023-02-28 DIAGNOSIS — Z94.2 LUNG REPLACED BY TRANSPLANT (H): ICD-10-CM

## 2023-02-28 DIAGNOSIS — D84.9 IMMUNOSUPPRESSED STATUS (H): Chronic | ICD-10-CM

## 2023-02-28 DIAGNOSIS — E09.65 DRUG OR CHEMICAL INDUCED DIABETES MELLITUS WITH HYPERGLYCEMIA (H): ICD-10-CM

## 2023-02-28 DIAGNOSIS — Z76.82 ORGAN TRANSPLANT CANDIDATE: ICD-10-CM

## 2023-02-28 DIAGNOSIS — D63.1 ANEMIA OF CHRONIC RENAL FAILURE, STAGE 3B (H): ICD-10-CM

## 2023-02-28 DIAGNOSIS — N18.6 END STAGE RENAL DISEASE (H): ICD-10-CM

## 2023-02-28 DIAGNOSIS — Z00.6 RESEARCH STUDY PATIENT: ICD-10-CM

## 2023-02-28 DIAGNOSIS — Z94.2 S/P LUNG TRANSPLANT (H): ICD-10-CM

## 2023-02-28 DIAGNOSIS — D80.1 HYPOGAMMAGLOBULINEMIA (H): Primary | ICD-10-CM

## 2023-02-28 LAB
ANION GAP SERPL CALCULATED.3IONS-SCNC: 12 MMOL/L (ref 7–15)
BUN SERPL-MCNC: 25.8 MG/DL (ref 8–23)
CALCIUM SERPL-MCNC: 9.6 MG/DL (ref 8.8–10.2)
CHLORIDE SERPL-SCNC: 96 MMOL/L (ref 98–107)
CREAT SERPL-MCNC: 2.96 MG/DL (ref 0.51–0.95)
DEPRECATED HCO3 PLAS-SCNC: 29 MMOL/L (ref 22–29)
ERYTHROCYTE [DISTWIDTH] IN BLOOD BY AUTOMATED COUNT: 16.4 % (ref 10–15)
EXPTIME-PRE: 4.21 SEC
FEF2575-%PRED-PRE: 175 %
FEF2575-PRE: 3.7 L/SEC
FEF2575-PRED: 2.11 L/SEC
FEFMAX-%PRED-PRE: 84 %
FEFMAX-PRE: 5.19 L/SEC
FEFMAX-PRED: 6.14 L/SEC
FEV1-%PRED-PRE: 67 %
FEV1-PRE: 1.51 L
FEV1FEV6-PRE: 98 %
FEV1FEV6-PRED: 81 %
FEV1FVC-PRE: 98 %
FEV1FVC-PRED: 80 %
FIFMAX-PRE: 4.01 L/SEC
FVC-%PRED-PRE: 54 %
FVC-PRE: 1.54 L
FVC-PRED: 2.81 L
GFR SERPL CREATININE-BSD FRML MDRD: 17 ML/MIN/1.73M2
GLUCOSE SERPL-MCNC: 117 MG/DL (ref 70–99)
HCT VFR BLD AUTO: 33.1 % (ref 35–47)
HGB BLD-MCNC: 10.5 G/DL (ref 11.7–15.7)
LVEF ECHO: NORMAL
MAGNESIUM SERPL-MCNC: 2.3 MG/DL (ref 1.7–2.3)
MCH RBC QN AUTO: 35 PG (ref 26.5–33)
MCHC RBC AUTO-ENTMCNC: 31.7 G/DL (ref 31.5–36.5)
MCV RBC AUTO: 110 FL (ref 78–100)
PLATELET # BLD AUTO: 245 10E3/UL (ref 150–450)
POTASSIUM SERPL-SCNC: 4.4 MMOL/L (ref 3.4–5.3)
RBC # BLD AUTO: 3 10E6/UL (ref 3.8–5.2)
SODIUM SERPL-SCNC: 137 MMOL/L (ref 136–145)
WBC # BLD AUTO: 4.2 10E3/UL (ref 4–11)

## 2023-02-28 PROCEDURE — 71046 X-RAY EXAM CHEST 2 VIEWS: CPT | Performed by: RADIOLOGY

## 2023-02-28 PROCEDURE — 80048 BASIC METABOLIC PNL TOTAL CA: CPT | Performed by: PATHOLOGY

## 2023-02-28 PROCEDURE — G0463 HOSPITAL OUTPT CLINIC VISIT: HCPCS | Performed by: PHYSICIAN ASSISTANT

## 2023-02-28 PROCEDURE — 94375 RESPIRATORY FLOW VOLUME LOOP: CPT | Performed by: PHYSICIAN ASSISTANT

## 2023-02-28 PROCEDURE — 36415 COLL VENOUS BLD VENIPUNCTURE: CPT | Performed by: PATHOLOGY

## 2023-02-28 PROCEDURE — 93306 TTE W/DOPPLER COMPLETE: CPT | Performed by: INTERNAL MEDICINE

## 2023-02-28 PROCEDURE — 83735 ASSAY OF MAGNESIUM: CPT | Performed by: PATHOLOGY

## 2023-02-28 PROCEDURE — 86833 HLA CLASS II HIGH DEFIN QUAL: CPT | Performed by: PHYSICIAN ASSISTANT

## 2023-02-28 PROCEDURE — 99214 OFFICE O/P EST MOD 30 MIN: CPT | Mod: 25 | Performed by: PHYSICIAN ASSISTANT

## 2023-02-28 PROCEDURE — 85027 COMPLETE CBC AUTOMATED: CPT | Performed by: PATHOLOGY

## 2023-02-28 PROCEDURE — 86832 HLA CLASS I HIGH DEFIN QUAL: CPT | Performed by: PHYSICIAN ASSISTANT

## 2023-02-28 NOTE — LETTER
2/28/2023         RE: Sofie Rodriguez  [address] [place] [address]        Dear Colleague,    Thank you for referring your patient, Sofie Rodriguez, to the [facility] [place]. Please see a copy of my visit note below.    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 diff colitis, gastroparesis s/p GJ tube placement now s/p EGD and botox, GI bleed 2/2 pyloric ulcer, hemobilia s/p ERCP and MRCP, ESRD on iHD, pneumonia and influenza A who is seen for routine follow up. Other history notable for HFpEF, NTM colonoization, hepatitis C, and methamphetamine use.     1. S/p bilateral lung transplant:  Persistent bibasilar pleural effusions:   Right hemidiaphragm palsy: overall feeling well, continues with pulmonary rehab with improving endurance. Sating 96% on room air. S/p bronch on 1/25, no biopsies, BAL with no growth. Repeated overnight O2 study on room air and requires O2, also with multiple desaturation events. CMV 1/24 negative. CXR reviewed today and demonstrates small left effusion vs. scarring. PFTs stable with FEV1 of 1.5L.   - On three drug IS including zathioprine 100 mg daily, tacrolimus (goal 8-12) and prednisone  - Dapsone qMWF for PJP ppx (Bactrim stopped d/t hyperkalemia; Pentamidine neb not tolerated)  - Wear 2L O2 at night, will set patient up with [facility]    2. 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   - DSA pending    3. EBV viremia: last level of 3K (log 3.5) on 1/24.   - EBV pending      4. Hypogammaglobulinemia: IgG adequate at time of transplant, repeat level low (336) on 7/28. S/p IVIG 7/30. Last IgG of 844 on 11/11/22.       5. ESRD based upon cystatin C (GFR of 10): s/p tunneled line placement and HD initiated 9/26 on T/Th/Sat schedule. Now with LUE fistula X 2 weeks. Notes she needs to move her appointment with Nephrology to discuss possible transplant.      6. Diarrhea: stable, likely exacerbated by TF.   - Continue fiber and imodium PRN    7. Severe gastroparesis:   Pyloric ulcer: now s/p EGD with botox by Dr. Navarro, still can only eat very small portions of food. Continues with TF overnight for 12 hours. Has follow up with him next week.  - PPI BID  - TC cycled feeds    RTC: 1 month with bronch  Vaccinations: flu shot and bivalent covid vaccine  Annual dermatology visit: will see Derm in May  Preventative: DEXA > June 2023; colonoscopy in 2025, mammogram completed two weeks ago    Kaylin Triana PA-C  Pulmonary, Allergy, Critical Care and Sleep Medicine        Interval History:     Still doing pulmonary rehab twice per week and has some weights for home. No new or expected shortness of breath, minimal cough, no fever or chills. No nasal or sinus congestion, no chest pain or palpitations. Since the botox, patient feels like she can eat a little bit more, but cannot eat a regular meal. Still doing cycled TF, 12 hours per day. Bloating with eating the wrong food or too much, rare nausea, but no vomiting. Stools are mainly loose. Doesn't drink a lot of fluids, flushes her tube in the morning of dialysis and with meds.          Review of Systems:   Please see HPI, otherwise the complete 10 point ROS is negative.           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     Hepatitis 2017    Hep C, [facility]     HTN (hypertension)      Lung infection 11/30/2022     Osteopenia      Past Surgical History:   Procedure Laterality Date      BRONCHOSCOPY (RIGID OR FLEXIBLE), DIAGNOSTIC N/A 8/2/2022    Procedure: BRONCHOSCOPY, DIAGNOSTIC- inspection Bronch;  Surgeon: Kamala Lovell MD;  Location:  GI     BRONCHOSCOPY (RIGID OR FLEXIBLE), DIAGNOSTIC N/A 9/13/2022    Procedure: INSPECTION BRONCHOSCOPY, WITH BRONCHOALVEOLAR LAVAGE;  Surgeon: Jose R Mccullough MD;  Location:  GI     BRONCHOSCOPY (RIGID OR FLEXIBLE), DIAGNOSTIC N/A 11/9/2022    Procedure: BRONCHOSCOPY, WITH BRONCHOALVEOLAR LAVAGE AND BIOPSY;  Surgeon: Cesar Lima MD;  Location:  GI     BRONCHOSCOPY (RIGID OR FLEXIBLE), DIAGNOSTIC N/A 1/25/2023    Procedure: BRONCHOSCOPY, WITH BRONCHOALVEOLAR LAVAGE AND BIOPSY;  Surgeon: Mason Reddy MD;  Location:  GI     BRONCHOSCOPY FLEXIBLE AND RIGID N/A 07/19/2022    Procedure: BRONCHOSCOPY inspection only;  Surgeon: Bob Liao MD;  Location:  GI     COLONOSCOPY  2015     CV CORONARY ANGIOGRAM N/A 06/30/2021    Procedure: CV CORONARY ANGIOGRAM;  Surgeon: Alexander Cuellar MD;  Location:  HEART CARDIAC CATH LAB     CV RIGHT HEART CATH MEASUREMENTS RECORDED N/A 06/30/2021    Procedure: CV RIGHT HEART CATH;  Surgeon: Alexander Cuellar MD;  Location:  HEART CARDIAC CATH LAB     ENDOSCOPIC RETROGRADE CHOLANGIOPANCREATOGRAM N/A 8/11/2022    Procedure: ENDOSCOPIC RETROGRADE CHOLANGIOPANCREATOGRAPHY WITH PANCREATIC DUCT NEEDLE KNIFE AND STENT PLACEMENT, BILE DUCT SPHINCTEROTOMY, BLOOD/DEBRIS REMOVAL AND STENT PLACEMENT;  Surgeon: Cosmo Arroyo MD;  Location:  OR     ENDOSCOPIC RETROGRADE CHOLANGIOPANCREATOGRAM N/A 10/7/2022    Procedure: ENDOSCOPIC RETROGRADE CHOLANGIOPANCREATOGRAPHY with biliary and pancreatic stent removal, debris removal;  Surgeon: Cosmo Arroyo MD;  Location:  OR     ENT SURGERY  1974    tonsillectomy     ENTEROSCOPY SMALL BOWEL N/A 8/11/2022    Procedure: SMALL BOWEL ENTEROSCOPY;  Surgeon: Cosmo Arroyo MD;  Location:  OR     ESOPHAGOGASTRODUODENOSCOPY, WITH NASOGASTRIC  TUBE INSERTION N/A 07/01/2022    Procedure: ESOPHAGOGASTRODUODENOSCOPY, WITH NASOJEJUNAL TUBE INSERTION;  Surgeon: Ozzy Nickerson MD;  Location:  GI     ESOPHAGOSCOPY, GASTROSCOPY, DUODENOSCOPY (EGD), COMBINED N/A 8/3/2022    Procedure: ESOPHAGOGASTRODUODENOSCOPY (EGD);  Surgeon: Ira Andres MD;  Location:  GI     ESOPHAGOSCOPY, GASTROSCOPY, DUODENOSCOPY (EGD), COMBINED N/A 1/25/2023    Procedure: ESOPHAGOGASTRODUODENOSCOPY (EGD) with botox injection;  Surgeon: Gonzalez Navarro MD;  Location:  GI     HAND SURGERY       INSERT CHEST TUBE Right 9/13/2022    Procedure: Insert chest tube;  Surgeon: Jose R Mccullough MD;  Location:  GI     IR CVC TUNNEL PLACEMENT > 5 YRS OF AGE  9/26/2022     IR GASTRO JEJUNOSTOMY TUBE CHANGE  8/31/2022     IR GASTRO JEJUNOSTOMY TUBE CHANGE  12/21/2022     IR GASTRO JEJUNOSTOMY TUBE PLACEMENT  7/27/2022     IR THORACENTESIS  8/29/2022     LEEP TX, CERVICAL  04/07/2017    HECTOR III     LYMPH NODE BIOPSY Left 2005    Left axilla, benign- [place]     MIDLINE INSERTION - DOUBLE LUMEN Left 07/28/2022    20cm, Basilic vein     REPLACE GASTROJEJUNOSTOMY TUBE, PERCUTANEOUS  10/7/2022    Procedure: Replace Gastrojejunostomy Tube;  Surgeon: Cosmo Arroyo MD;  Location: [facility] OR     THORACENTESIS Left 8/29/2022    Procedure: THORACENTESIS;  Surgeon: Bo Capone PA-C;  Location: [facility] OR     THORACENTESIS Left 9/13/2022    Procedure: Thoracentesis;  Surgeon: Jose R Mccullough MD;  [facility]     THROMBECTOMY UPPER EXTREMITY Left 07/02/2022    Procedure: LEFT RADIAL ARM THROMBECTOMY;  Surgeon: Christie Graham MD;  [facility]     TRANSPLANT LUNG RECIPIENT SINGLE X2 Bilateral 06/28/2022    Procedure: Clamshell Incision, Bilateral Sequential Lung Transplant, On Cardiopulmonary Bypass, Flexible Bronchoscopy;  Surgeon: Sue Sunshine MD;  Location:  OR           Family History:     No family history on file.         Social History:      Social History     Socioeconomic History     Marital status: Single     Spouse name: Not on file     Number of children: Not on file     Years of education: Not on file     Highest education level: Not on file   Occupational History     Not on file   Tobacco Use     Smoking status: Former     Years: 30.00     Types: Cigarettes     Quit date: 2020     Years since quittin.3     Smokeless tobacco: Never   Substance and Sexual Activity     Alcohol use: Not Currently     Drug use: Not Currently     Types: Marijuana, Methamphetamines     Comment: hx:marijuana and methamphetamine-quit both unsure ?  2-3 yrs ago     Sexual activity: Not on file   Other Topics Concern     Parent/sibling w/ CABG, MI or angioplasty before 65F 55M? Not Asked   Social History Narrative     Not on file     Social Determinants of Health     Financial Resource Strain: Not on file   Food Insecurity: Not on file   Transportation Needs: Not on file   Physical Activity: Not on file   Stress: Not on file   Social Connections: Not on file   Intimate Partner Violence: Not on file   Housing Stability: Not on file            Medications:     Current Outpatient Medications   Medication     alcohol swab prep pads     azaTHIOprine (IMURAN) 5 mg/mL SUSP     B Complex-C-Folic Acid (DIALYVITE) TABS     blood glucose (CONTOUR NEXT TEST) test strip     blood glucose (NO BRAND SPECIFIED) lancets standard     blood glucose monitoring (CONTOUR NEXT MONITOR W/DEVICE KIT) meter device kit     calcium carbonate 600 mg-vitamin D 400 units (CALTRATE) 600-400 MG-UNIT per tablet     cyanocobalamin (CYANOCOBALAMIN) 500 MCG tablet     dapsone 2 mg/mL SUSP     Guar Gum (FIBER MODULAR, NUTRISOURCE FIBER,) packet     insulin pen needle (32G X 4 MM) 32G X 4 MM miscellaneous     loperamide (IMODIUM A-D) 2 MG tablet     metoprolol tartrate (LOPRESSOR) 50 MG tablet     Nutritional Supplements (GLUCOSE MANAGEMENT) TABS     pantoprazole (PROTONIX) 2 mg/mL SUSP suspension      predniSONE (DELTASONE) 5 MG tablet     protein modular (PROSOURCE TF) LIQD     Sharps Container MISC     tacrolimus (GENERIC EQUIVALENT) 1 mg/mL suspension     No current facility-administered medications for this visit.            Physical Exam:   /71   Pulse 83   SpO2 96%     GENERAL: alert, NAD  HEENT: NCAT, EOMI, no scleral icterus, oral mucosa moist and without lesions  Neck: no cervical or supraclavicular adenopathy  Lungs: moderate airflow, decreased in bases   CV: RRR, S1S2, + murmur  Abdomen: normoactive BS, soft, non tender; GJ tube in place  Lymph: no edema  Neuro: AAO X 3, CN 2-12 grossly intact  Psychiatric: normal affect, good eye contact  Skin: no rash, jaundice or lesions on limited exam         Data:   All laboratory and imaging data reviewed.      Recent Results (from the past 168 hour(s))   Tacrolimus by [employer] Mass Spectrometry    Collection Time: 02/24/23  8:22 AM   Result Value Ref Range    Tacrolimus by Tandem Mass Spectrometry 10.1 5.0 - 15.0 ug/L    Tacrolimus Last Dose Date 2/23/2023     Tacrolimus Last Dose Time  8:00 AM    Basic metabolic panel    Collection Time: 02/24/23  8:39 AM   Result Value Ref Range    Sodium (External) 138 136 - 146 mmol/L    Potassium (External) 4.5 3.5 - 5.1 mmol/L    Chloride (External) (External) 96 (L) 98 - 107 mmol/L    CO2 (External) 31 (H) 22 - 29 mmol/L    Anion Gap (External) 15.5 10.0 - 20.0 mmol/L    Creatinine (External) 4.55 mg/dL    Urea Nitrogen (External) 41.4 (H) 10.0 - 20.0 mg/dL    BUN/Creatinine Ratio (External) 9.10 (L) 11.70 - 22.90 ratio    Calcium (External) 9.9 8.6 - 10.5 mg/dL    Glucose (External) 83 70 - 100 mg/dL    GFR Estimated (External) 10 (L) >=60 ml/min/1.73m2   Magnesium    Collection Time: 02/24/23  8:39 AM   Result Value Ref Range    Magnesium (External) 2.5 1.8 - 2.6 mg/dL   CBC with Platelets & Differential    Collection Time: 02/24/23  8:39 AM   Result Value Ref Range    % Neutrophils (External) 61.0 43.0 -  80.0 %    % Bands (External) 8.0 (H) 0.0 - 6.0 %    % Lymphocytes (External) 16.0 16.0 - 49.0 %    % Monocytes (External) 8.0 0.0 - 10.0 %    % Eosinophils (External) 7.0 0.0 - 7.0 %    Absolute Neutrophils (External) 2.6 1.8 - 9.2 10(3)/uL    Absolute Bands (External) 0.3 10(3)/uL    Absolute Lymphocytes (External) 0.7 (L) 1.2 - 3.9 10(3)/uL    Absolute Monocytes (External) 0.3 0.0 - 1.1 10(3)/uL    Absolute Eosinophils (External) 0.3 0.0 - 0.8 10(3)/uL    WBC Count (External) 4.2 Not provided 10(3)/uL    RBC Count (External) 3.06 Not provided 10(6)/uL    Hemoglobin (External) 10.8 Not provided g/dL    Hematocrit (External) 32.4 Not provided %    MCV (External) 106.0 Not provided fL    MCH (External) 35.4 Not provided pg    MCHC (External) 33.3 32.0 - 36.0 g/dL    RDW (External) 16.8 Not provided %    Platelet Count (External) 227 179 - 450 10(3)/uL   General PFT Lab (Please always keep checked)    Collection Time: 02/28/23 11:46 AM   Result Value Ref Range    FVC-Pred 2.81 L    FVC-Pre 1.54 L    FVC-%Pred-Pre 54 %    FEV1-Pre 1.51 L    FEV1-%Pred-Pre 67 %    FEV1FVC-Pred 80 %    FEV1FVC-Pre 98 %    FEFMax-Pred 6.14 L/sec    FEFMax-Pre 5.19 L/sec    FEFMax-%Pred-Pre 84 %    FEF2575-Pred 2.11 L/sec    FEF2575-Pre 3.70 L/sec    SGU8743-%Pred-Pre 175 %    ExpTime-Pre 4.21 sec    FIFMax-Pre 4.01 L/sec    FEV1FEV6-Pred 81 %    FEV1FEV6-Pre 98 %   Basic metabolic panel    Collection Time: 02/28/23  1:04 PM   Result Value Ref Range    Sodium 137 136 - 145 mmol/L    Potassium 4.4 3.4 - 5.3 mmol/L    Chloride 96 (L) 98 - 107 mmol/L    Carbon Dioxide (CO2) 29 22 - 29 mmol/L    Anion Gap 12 7 - 15 mmol/L    Urea Nitrogen 25.8 (H) 8.0 - 23.0 mg/dL    Creatinine 2.96 (H) 0.51 - 0.95 mg/dL    Calcium 9.6 8.8 - 10.2 mg/dL    Glucose 117 (H) 70 - 99 mg/dL    GFR Estimate 17 (L) >60 mL/min/1.73m2   Magnesium    Collection Time: 02/28/23  1:04 PM   Result Value Ref Range    Magnesium 2.3 1.7 - 2.3 mg/dL   CBC with platelets     Collection Time: 02/28/23  1:04 PM   Result Value Ref Range    WBC Count 4.2 4.0 - 11.0 10e3/uL    RBC Count 3.00 (L) 3.80 - 5.20 10e6/uL    Hemoglobin 10.5 (L) 11.7 - 15.7 g/dL    Hematocrit 33.1 (L) 35.0 - 47.0 %     (H) 78 - 100 fL    MCH 35.0 (H) 26.5 - 33.0 pg    MCHC 31.7 31.5 - 36.5 g/dL    RDW 16.4 (H) 10.0 - 15.0 %    Platelet Count 245 150 - 450 10e3/uL   EKG 12-lead, tracing only [EKG1]    Collection Time: 02/28/23  3:55 PM   Result Value Ref Range    Systolic Blood Pressure  mmHg    Diastolic Blood Pressure  mmHg    Ventricular Rate 77 BPM    Atrial Rate 77 BPM    WY Interval 134 ms    QRS Duration 74 ms     ms    QTc 448 ms    P Axis 55 degrees    R AXIS 7 degrees    T Axis 50 degrees    Interpretation ECG       Sinus rhythm  Left atrial enlargement  Borderline ECG  When compared with ECG of 14-SEP-2022 21:51,  No significant change was found  Confirmed by MD MOISES, SERA ([id number]) on 3/1/2023 9:13:28 AM     Echocardiogram Complete    Collection Time: 02/28/23  4:26 PM   Result Value Ref Range    LVEF  55-60%      PFT interpretation:  Maneuver: valid and met ATS guidelines, but only for repeatability  Mild obstruction with Z score -2.16  Compared to prior: FEV1 of 1.51 is 10 ml above prior        Again, thank you for allowing me to participate in the care of your patient.      Sincerely,    Kaylin Triana PA-C

## 2023-02-28 NOTE — NURSING NOTE
Transplant Coordinator Note     Reason for visit: Post lung transplant follow up visit   Coordinator: Present (Veronique in person)  Caregiver:  Not present     Health concerns addressed today:  1. EGD/botox and bronch went well last month.   2. Resp: feeling well, no SOB. Reviewed recent overnight oximetry.   3. GI: able to eat a little more, but still not a full meal. Symptomatic (bloating, gassy) if she eats too much. Doesn't sound good and doesn't sit well. Following with Dr. Navarro tomorrow. Stools still mostly runny.   4.     Activity/rehab: Up ad magalie, pulm rehab twice per week  Oxygen needs: Only using oxygen at night, 2L  Pain management/RX: tylenol and oxycodone as needed, incisional pain from time to time.   Diabetic management: managed by PCP; checking twice per day  Next Bronch due: end of March 2023  Risk Criteria Labs: negative 7/28/22  CMV status: D+/R+  Valcyte stopped: POD 8-90  EBV status: D+/R+  AC/asa:  ASA discontinued after recent hospitalization   PJP prophylactic: Dapsone   Diet: Ad magalie. TF 12 hours/day.     COVID:  1. COVID-19 infection (yes/no, date of most recent positive test):    2. Status/instructions given about COVID-19 vaccine: Vaccinated, booster x2 last 3/21/22. Received Evusheld 8/24/2022. Received flu shot 10/12/22.     Pt Education: medications (use/dose/side effects), how/when to call coordinator, frequency of labs, s/s of infection/rejection, call prior to starting any new medications, lab/vital sign book     Health Maintenance:     Last colonoscopy:     Next colonoscopy due: 2025    Dermatology: Set up for May 2023    Vaccinations this visit: None     Labs, CXR, PFTs reviewed with patient  Medication record reviewed and reconciled  Questions and concerns addressed     Patient Instructions  1. Continue to hydrate with 60-70 oz fluids daily.  2. Continue to exercise daily or most days of the week.  3. Follow up with your primary care provider for annual gender health maintenance  procedures.  4. Follow up with colonoscopy schedule.  5. Follow up with annual dermatology visits.  6. It doesn't seem like the COVID vaccine is working well in lung transplant patients. A number of lung transplant patients have gotten sick with COVID even after receiving the vaccines.  Based on our recent experience, it can be life-threatening to get COVID  even after being vaccinated. Please continue to act like you did not get the COVID vaccine - social distancing, wearing a mask, good hand hygiene, etc. If the people around you are vaccinated, it will help reduce the risk of you getting COVID. All members of your household should be vaccinated.  7. If you get to the end of your pulmonary rehab, let us know if you want to try to extend it.   8. Due for a bronch after your next visit.     Next transplant clinic appointment: 1 month with CXR, labs and PFTs  Next lab draw: 2 weeks    AVS printed at time of check out

## 2023-02-28 NOTE — PATIENT INSTRUCTIONS
Patient Instructions  1. Continue to hydrate with 60-70 oz fluids daily.  2. Continue to exercise daily or most days of the week.  3. Follow up with your primary care provider for annual gender health maintenance procedures.  4. Follow up with colonoscopy schedule.  5. Follow up with annual dermatology visits.  6. It doesn't seem like the COVID vaccine is working well in lung transplant patients. A number of lung transplant patients have gotten sick with COVID even after receiving the vaccines.  Based on our recent experience, it can be life-threatening to get COVID  even after being vaccinated. Please continue to act like you did not get the COVID vaccine - social distancing, wearing a mask, good hand hygiene, etc. If the people around you are vaccinated, it will help reduce the risk of you getting COVID. All members of your household should be vaccinated.  7. If you get to the end of your pulmonary rehab, let us know if you want to try to extend it.   8. Due for a bronch after your next visit.     Next transplant clinic appointment: 1 month with CXR, labs and PFTs  Next lab draw: 2 weeks

## 2023-03-01 ENCOUNTER — VIRTUAL VISIT (OUTPATIENT)
Dept: GASTROENTEROLOGY | Facility: CLINIC | Age: 63
End: 2023-03-01
Attending: INTERNAL MEDICINE
Payer: MEDICARE

## 2023-03-01 VITALS — BODY MASS INDEX: 26.13 KG/M2 | HEIGHT: 62 IN | WEIGHT: 142 LBS

## 2023-03-01 DIAGNOSIS — K31.84 GASTROPARESIS: Primary | ICD-10-CM

## 2023-03-01 LAB
ATRIAL RATE - MUSE: 77 BPM
CMV DNA SPEC NAA+PROBE-ACNC: NOT DETECTED IU/ML
DIASTOLIC BLOOD PRESSURE - MUSE: NORMAL MMHG
INTERPRETATION ECG - MUSE: NORMAL
P AXIS - MUSE: 55 DEGREES
PR INTERVAL - MUSE: 134 MS
QRS DURATION - MUSE: 74 MS
QT - MUSE: 396 MS
QTC - MUSE: 448 MS
R AXIS - MUSE: 7 DEGREES
SYSTOLIC BLOOD PRESSURE - MUSE: NORMAL MMHG
T AXIS - MUSE: 50 DEGREES
VENTRICULAR RATE- MUSE: 77 BPM

## 2023-03-01 PROCEDURE — G0463 HOSPITAL OUTPT CLINIC VISIT: HCPCS | Mod: PN,GT | Performed by: INTERNAL MEDICINE

## 2023-03-01 PROCEDURE — 99213 OFFICE O/P EST LOW 20 MIN: CPT | Mod: VID | Performed by: INTERNAL MEDICINE

## 2023-03-01 ASSESSMENT — PAIN SCALES - GENERAL: PAINLEVEL: NO PAIN (0)

## 2023-03-01 NOTE — PROGRESS NOTES
Sofie is a 62 year old who is being evaluated via a billable video visit.      How would you like to obtain your AVS? MyChart  If the video visit is dropped, the invitation should be resent by: Send to e-mail at: [email or web address]  Will anyone else be joining your video visit? Palma Pink      Video-Visit Details    Type of service:  Video Visit   Video Start Time: 12:00 PM  Video End Time:12:20 PM    Originating Location (pt. Location): Home    Distant Location (provider location):  On-site  Platform used for Video Visit: [facility]     INTERVENTIONAL ENDOSCOPY OUTPATIENT CLINIC CONSULT  DATE OF SERVICE: 03/01/23   PROVIDER REQUESTING CONSULT: Kaylin Triana PA-C  Reason for Consultation: gastroparesis     ASSESSMENT:  Sofie is a 62 year old female with a PMHx of end stage COPD s/p bilateral lung transplant on 6/28/22 complicated by acute limb ischemia of LUE s/p left radial thrombectomy, h/o C. Diff, h/o EBV viremia, ESRD on dialysis, hemobilia s/p ERCP presumably due to hemorrhage into gallbladder, referred for gastroparesis s/p PEGJ placement to discuss endoscopic options s/p pyloric botox injection on 1/25/23 here for follow up. Her GCSI total went from 21 to 12 and she notes symptomatic response. This makes it more likely she would respond to a more invasive intervention such as a G-POEM.    Slightly complicating the diagnosis of true post-surgical/vagal injury gastroparesis is the identification of a pyloric channel ulcer which creates gastric outlet obstruction through a separate mechanism although on her recent EGD that did not appear to be the case. A subset of patients will have spontaneous improvement over the course of a year following their surgery, but if gastroparesis persists beyond this then it is unlikely to resolve. Encouragingly, her gastric emptying study noted improvement in emptying from January 2023 compared to September 2022. She would like to wait and see how she feels after the one  year remington before proceeding with a G-POEM which is very reasonable.    We discussed the G-POEM procedure in detail and reviewed what we currently know in terms of outcomes.    Donal Presley et al.  Endoscopic pyloromyotomy for the treatment of severe and refractory gastroparesis: a , randomised, sham-controlled trial.  Gut vol. 71,11 (2022): 2766-9263.  Clinical treatment success rate was 71% after G-POEM versus 22% after sham (p=0.005).   Subgroups:  Diabetics 89% vs 17%  Postsurgical 50% vs 29%  Idiopathic 67% vs 20%    Median gastric retention at 4 hours decreased from 22% to 12% after G-POEM and did not change after sham: 26% versus 24%.    There does appear to be a drop off in clinical efficacy over time (recurrence of symptoms), but efficacy still remains reasonably high.     Sheela Antonio et al.  Long-term Outcome of Gastric Per-Oral Endoscopic Pyloromyotomy in Treatment of Gastroparesis.  Clinical gastroenterology and hepatology : the official clinical practice journal of the American Gastroenterological Association vol. 19,4 (2021): 816-824.    Gordo Sharma et al.  Gastric peroral endoscopic myotomy outcomes after 4 years of follow-up in a large cohort of patients with refractory gastroparesis (with video).  Gastrointestinal endoscopy vol. 96,3 (2022): 487-499.    We discussed the risks of the procedure which include bleeding, gastric leak/perforation, and ulceration at the mucosotomy site. After the procedure, I recommend overnight observation with an UGIS the following day to rule out leak and BID PPI for 30 days. I also recommend liquid diet prior to procedure and slow advancement of diet over the course of a week following the procedure.    We will re-evaluate in July    RECOMMENDATIONS:  - RTC in July 2023    Gonzalez Navarro MD  [facility]  Division of Gastroenterology and Hepatology  [facility] [address] [place], [place]  [address]    ________________________________________________________________  HPI:  Sofie is a 62 year old female with a PMHx of end stage COPD s/p bilateral lung transplant on 6/28/22 complicated by acute limb ischemia of LUE s/p left radial thrombectomy, h/o C. Diff, h/o EBV viremia, ESRD on dialysis, hemobilia s/p ERCP presumably due to hemorrhage into gallbladder, referred for gastroparesis s/p PEGJ placement to discuss endoscopic options.     PRIOR History:  She essentially developed gastroparesis following her lung transplant and underwent PEGJ placement by IR on 7/27/22. But also of note is development of a pyloric channel ulcer seen on an EGD on 8/11/22. She's been on BID PPI since then. Most recent gastric emptying study on 1/2/2023 shows persistent delayed emptying of 75% at 240 min. She continues on J-tube feeds but does eat sometimes for comfort. Eating can make her symptomatic with nausea, bloating/gas. She will periodically vent her G-tube about once a week, sometimes more frequently if she's been eating, when she has symptoms of bloating/gas and distension which helps. She has never been on Reglan. She still is on 18 hour J-tube feeds.     Interval History:  She underwent EGD with pyloric Botox injection pm 1/25/23. She notices an improvement following the procedure although still some residual symptoms. She thinks she is eating more by mouth now and venting less. She continues on her J-tube feeds. Certainly less nausea and bloating.    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     Hepatitis 2017    Hep C, [facility]     HTN (hypertension)      Lung infection 11/30/2022     Osteopenia      Patient Active Problem List   Diagnosis     COPD (chronic  obstructive pulmonary disease) (H)     Abnormal findings on diagnostic imaging of cardiovascular system     Status post coronary angiogram     Hepatitis C, chronic (H)     S/P lung transplant (H)     Acute post-operative pain     Immunosuppressed status (H)     Acute kidney failure with tubular necrosis (H)     Thrombus of left radial artery (H)     Diaphragm dysfunction     Paroxysmal A-fib (H)     Administration of long-term prophylactic antibiotics     EBV (Vibha-Barr virus) viremia     Acute pylorus ulcer     Hypogammaglobulinemia (H)     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)     Anemia     Gastrojejunostomy tube status (H)     Stage 3b chronic kidney disease (H)     Anemia of chronic renal failure, stage 3b (H)     Transplant rejection     Hemoptysis     Pulmonary edema     Lung infection       PSurgHx:  Past Surgical History:   Procedure Laterality Date     BRONCHOSCOPY (RIGID OR FLEXIBLE), DIAGNOSTIC N/A 8/2/2022    Procedure: BRONCHOSCOPY, DIAGNOSTIC- inspection Bronch;  Surgeon: Kamala Lovell MD;  Location:  GI     BRONCHOSCOPY (RIGID OR FLEXIBLE), DIAGNOSTIC N/A 9/13/2022    Procedure: INSPECTION BRONCHOSCOPY, WITH BRONCHOALVEOLAR LAVAGE;  Surgeon: Jose R Mccullough MD;  [facility]     BRONCHOSCOPY (RIGID OR FLEXIBLE), DIAGNOSTIC N/A 11/9/2022    Procedure: BRONCHOSCOPY, WITH BRONCHOALVEOLAR LAVAGE AND BIOPSY;  Surgeon: Cesar Lima MD;  Location:  GI     BRONCHOSCOPY (RIGID OR FLEXIBLE), DIAGNOSTIC N/A 1/25/2023    Procedure: BRONCHOSCOPY, WITH BRONCHOALVEOLAR LAVAGE AND BIOPSY;  Surgeon: Mason Reddy MD;  Location:  GI     BRONCHOSCOPY FLEXIBLE AND RIGID N/A 07/19/2022    Procedure: BRONCHOSCOPY inspection only;  Surgeon: Bob Liao MD;  Location:  GI     COLONOSCOPY  2015     CV CORONARY ANGIOGRAM N/A 06/30/2021    Procedure: CV CORONARY ANGIOGRAM;  Surgeon: Alexander Cuellar MD;  Location:  HEART CARDIAC CATH LAB     CV RIGHT HEART  CATH MEASUREMENTS RECORDED N/A 06/30/2021    Procedure: CV RIGHT HEART CATH;  Surgeon: Alexander Cuellar MD;  Location:  HEART CARDIAC CATH LAB     ENDOSCOPIC RETROGRADE CHOLANGIOPANCREATOGRAM N/A 8/11/2022    Procedure: ENDOSCOPIC RETROGRADE CHOLANGIOPANCREATOGRAPHY WITH PANCREATIC DUCT NEEDLE KNIFE AND STENT PLACEMENT, BILE DUCT SPHINCTEROTOMY, BLOOD/DEBRIS REMOVAL AND STENT PLACEMENT;  Surgeon: Cosmo Arroyo MD;  [facility]     ENDOSCOPIC RETROGRADE CHOLANGIOPANCREATOGRAM N/A 10/7/2022    Procedure: ENDOSCOPIC RETROGRADE CHOLANGIOPANCREATOGRAPHY with biliary and pancreatic stent removal, debris removal;  Surgeon: Cosmo Arroyo MD;  Location:  OR     ENT SURGERY  1974    tonsillectomy     ENTEROSCOPY SMALL BOWEL N/A 8/11/2022    Procedure: SMALL BOWEL ENTEROSCOPY;  Surgeon: Cosmo Arroyo MD;  Location:  OR     ESOPHAGOGASTRODUODENOSCOPY, WITH NASOGASTRIC TUBE INSERTION N/A 07/01/2022    Procedure: ESOPHAGOGASTRODUODENOSCOPY, WITH NASOJEJUNAL TUBE INSERTION;  Surgeon: Ozzy Nickerson MD;  Location:  GI     ESOPHAGOSCOPY, GASTROSCOPY, DUODENOSCOPY (EGD), COMBINED N/A 8/3/2022    Procedure: ESOPHAGOGASTRODUODENOSCOPY (EGD);  Surgeon: Ira Andres MD;  Location:  GI     ESOPHAGOSCOPY, GASTROSCOPY, DUODENOSCOPY (EGD), COMBINED N/A 1/25/2023    Procedure: ESOPHAGOGASTRODUODENOSCOPY (EGD) with botox injection;  Surgeon: Gonzalez Navarro MD;  Location:  GI     HAND SURGERY       INSERT CHEST TUBE Right 9/13/2022    Procedure: Insert chest tube;  Surgeon: Jose R Mccullough MD;  Location:  GI     IR CVC TUNNEL PLACEMENT > 5 YRS OF AGE  9/26/2022     IR GASTRO JEJUNOSTOMY TUBE CHANGE  8/31/2022     IR GASTRO JEJUNOSTOMY TUBE CHANGE  12/21/2022     IR GASTRO JEJUNOSTOMY TUBE PLACEMENT  7/27/2022     IR THORACENTESIS  8/29/2022     LEEP TX, CERVICAL  04/07/2017    HECTOR III     LYMPH NODE BIOPSY Left 2005    Left axilla, benign- [place]     MIDLINE INSERTION -  DOUBLE LUMEN Left 07/28/2022    20cm, Basilic vein     REPLACE GASTROJEJUNOSTOMY TUBE, PERCUTANEOUS  10/7/2022    Procedure: Replace Gastrojejunostomy Tube;  Surgeon: Cosmo Arroyo MD;  [facility]     THORACENTESIS Left 8/29/2022    Procedure: THORACENTESIS;  Surgeon: Bo Capone PA-C;  Location: [facility] OR     THORACENTESIS Left 9/13/2022    Procedure: Thoracentesis;  Surgeon: Jose R Mccullough MD;  [facility]     THROMBECTOMY UPPER EXTREMITY Left 07/02/2022    Procedure: LEFT RADIAL ARM THROMBECTOMY;  Surgeon: Christie Graham MD;  [facility]     TRANSPLANT LUNG RECIPIENT SINGLE X2 Bilateral 06/28/2022    Procedure: Clamshell Incision, Bilateral Sequential Lung Transplant, On Cardiopulmonary Bypass, Flexible Bronchoscopy;  Surgeon: Sue Sunshine MD;  [facility]       MEDS:  Current Outpatient Medications   Medication     alcohol swab prep pads     azaTHIOprine (IMURAN) 5 mg/mL SUSP     B Complex-C-Folic Acid (DIALYVITE) TABS     blood glucose (CONTOUR NEXT TEST) test strip     blood glucose (NO BRAND SPECIFIED) lancets standard     blood glucose monitoring (CONTOUR NEXT MONITOR W/DEVICE KIT) meter device kit     calcium carbonate 600 mg-vitamin D 400 units (CALTRATE) 600-400 MG-UNIT per tablet     cyanocobalamin (CYANOCOBALAMIN) 500 MCG tablet     dapsone 2 mg/mL SUSP     Guar Gum (FIBER MODULAR, NUTRISOURCE FIBER,) packet     insulin pen needle (32G X 4 MM) 32G X 4 MM miscellaneous     loperamide (IMODIUM A-D) 2 MG tablet     metoprolol tartrate (LOPRESSOR) 50 MG tablet     Nutritional Supplements (GLUCOSE MANAGEMENT) TABS     pantoprazole (PROTONIX) 2 mg/mL SUSP suspension     predniSONE (DELTASONE) 5 MG tablet     protein modular (PROSOURCE TF) LIQD     Sharps Container MISC     tacrolimus (GENERIC EQUIVALENT) 1 mg/mL suspension     No current facility-administered medications for this visit.     ALLERGIES:    Allergies   Allergen Reactions     Blood Transfusion  "Related (Informational Only) Other (See Comments)     Patient has a history of a clinically significant antibody against RBC antigens.  A delay in compatible RBCs may occur.      FHx: Noncontributory    SOCIAL Hx:  Social History     Socioeconomic History     Marital status: Single     Spouse name: Not on file     Number of children: Not on file     Years of education: Not on file     Highest education level: Not on file   Occupational History     Not on file   Tobacco Use     Smoking status: Former     Years: .     Types: Cigarettes     Quit date: 2020     Years since quittin.3     Smokeless tobacco: Never   Substance and Sexual Activity     Alcohol use: Not Currently     Drug use: Not Currently     Types: Marijuana, Methamphetamines     Comment: hx:marijuana and methamphetamine-quit both unsure ?  2-3 yrs ago     Sexual activity: Not on file   Other Topics Concern     Parent/sibling w/ CABG, MI or angioplasty before 65F 55M? Not Asked   Social History Narrative     Not on file     Social Determinants of Health     Financial Resource Strain: Not on file   Food Insecurity: Not on file   Transportation Needs: Not on file   Physical Activity: Not on file   Stress: Not on file   Social Connections: Not on file   Intimate Partner Violence: Not on file   Housing Stability: Not on file       ROS: A comprehensive Review of Systems was asked and answered in the negative unless specifically commented upon in the HPI    Physical Exam  Ht 1.575 m (5' 2\")   Wt 64.4 kg (142 lb)   BMI 25.97 kg/m    Body mass index is 25.97 kg/m .  Gen: A&Ox3, NAD  HEENT: Moist mucus membranes, no scleral icterus.  Lungs: no respiratory distress      LABS:  External Order Results on 2022   Component Date Value Ref Range Status     Sodium (External) 2022 137  136 - 146 mmol/L Final     Potassium (External) 2022 4.1  3.5 - 5.1 mmol/L Final     Chloride (External) (External) 2022 97 (L)  98 - 107 mmol/L Final "     CO2 (External) 12/30/2022 27  22 - 29 mmol/L Final     Anion Gap (External) 12/30/2022 17.1  10.0 - 20.0 mmol/L Final     Creatinine (External) 12/30/2022 4.34  mg/dL Final     Urea Nitrogen (External) 12/30/2022 43.6 (H)  10.0 - 20.0 mg/dL Final     BUN/Creatinine Ratio (External) 12/30/2022 10.05 (L)  11.70 - 22.90 ratio Final     Calcium (External) 12/30/2022 9.5  8.6 - 10.5 mg/dL Final     Glucose (External) 12/30/2022 102 (H)  70 - 100 mg/dL Final     GFR Estimated (External) 12/30/2022 11 (L)  >=60 ml/min/1.73m2 Final     Magnesium (External) 12/30/2022 2.5  1.8 - 2.6 mg/dL Final     WBC Count (External) 12/30/2022 4.7  10(3)/uL Final     RBC Count (External) 12/30/2022 2.81  10(6)/uL Final     Hemoglobin (External) 12/30/2022 10.3  g/dL Final     Hematocrit (External) 12/30/2022 31.1  % Final     MCV (External) 12/30/2022 110.7  fL Final     MCH (External) 12/30/2022 36.5  pg Final     MCHC (External) 12/30/2022 33.0  32.0 - 36.0 g/dL Final     RDW (External) 12/30/2022 16.2  % Final     Platelet Count (External) 12/30/2022 251  179 - 450 10(3)/uL Final         IMAGING:  EXAM:  NM GASTRIC EMPTYING, 1/2/2023 1:21 PM  HISTORY: Gastroparesis; Lung replaced by transplant (H)     TECHNIQUE: The patient ingested 2.1 mCi of Tc-99m sulfur colloid in 2  eggs, 2 pieces of toast w/ jelly. Static images were acquired at  approximately 1 hour intervals out through 4 hours using a dual head  gamma camera in an anterior and posterior position. The calculation of  emptying was based on dual head imaging with geometric mean  calculations.  A Linear square fit was calculated to the emptying  curve to determine the amount of residual activity at each time point.     FINDINGS:   Percent retention at 60 min = 87%.  Percent retention at 120 min = 77%.  Percent retention at 180 min = 77%.  Percent retention at 240 min = 75%.     T 1/2 emptying time =  238 mins.                                                                       IMPRESSION: Delayed gastric emptying    EXAM:  NM GASTRIC EMPTYING, 9/30/2022 12:32 PM     HISTORY: severe delayed gastric emptying for follow up; is off  dialysis. NPO weds night     TECHNIQUE: The patient ingested 2.3 mCi of Tc-99m sulfur colloid in in  2 eggs, 1 slices of toast with jelly, and a glass of water. Static  images were acquired at approximately 1 hour intervals out through 4  hours using a dual head gamma camera in an anterior and posterior  position. The calculation of emptying was based on dual head imaging  with geometric mean calculations.  A Linear square fit was calculated  to the emptying curve to determine the amount of residual activity at  each time point.     FINDINGS:   Percent retention at 60 min = 97%.  Percent retention at 120 min = 95%.  Percent retention at 180 min = 91%.  Percent retention at 240 min = 91%.     T 1/2 emptying time =  Too long to calculate.                                                                      IMPRESSION: Severe delayed gastric emptying    Endoscopies:  Upper GI Endoscopy 08/03/2022 11:49 AM [address] [address] [phone]     Endoscopy Department   _______________________________________________________________________________   Patient Name: Sofie Rodriguez            Procedure Date: 8/3/2022 11:49 AM   MRN: [id number]                       Account Number: [id number]   YOB: 1960               Admit Type: Inpatient   Age: 62                               Room: [place] [address]   Gender: Female                        Note Status: Finalized   Attending MD: GREGORIO CARSON MD Pause for the Cause: pause for    cause done   Total Sedation Time: 16 minutes         _______________________________________________________________________________       Procedure:             Upper GI endoscopy   Indications:           Coffee-ground emesis   Providers:              GREGORIO CARSON MD, NELSON FINE, Josefina Marks, RIYA, Lashonda Murrell RN   Patient Profile:       Ms. Jimenez is a 62 year old female with end stage COPD                          s/p bilateral lung transplant (6/28/22) complicated by                          acute limb ischemia of LUE requiring left radial                          thrombectomy, EBV viremia and C.diff (vancomycin                          7/12/22-7/28/22). She was found to have delayed                          gastric emptying on GES and underwent percutaneous GJ                          tube placement by IR on 7/27/22. GI consulted for                          development of dark G tube output concerning for upper                          GI bleed.   Referring MD:             Medicines:             Midazolam 3 mg IV, Fentanyl 100 micrograms IV   Complications:         No immediate complications.   _______________________________________________________________________________   Procedure:             Pre-Anesthesia Assessment:                          - Prior to the procedure, a History and Physical was                          performed, and patient medications and allergies were                          reviewed. The patient is competent. The risks and                          benefits of the procedure and the sedation options and                          risks were discussed with the patient. All questions                          were answered and informed consent was obtained.                          Patient identification and proposed procedure were                          verified by the physician and the nurse in the                          procedure room. Mental Status Examination: normal.                          Airway Examination: normal oropharyngeal airway and                          neck mobility and Mallampati Class III (part of the                          uvula and soft palate visualized).  Respiratory                          Examination: clear to auscultation. CV Examination:                          normal. Prophylactic Antibiotics: The patient does not                          require prophylactic antibiotics. Prior                          Anticoagulants: The patient has taken no anticoagulant                          or antiplatelet agents. ASA Grade Assessment: III - A                          patient with severe systemic disease. After reviewing                          the risks and benefits, the patient was deemed in                          satisfactory condition to undergo the procedure. The                          anesthesia plan was to use moderate sedation /                          analgesia (conscious sedation). Immediately prior to                          administration of medications, the patient was                          re-assessed for adequacy to receive sedatives. The                          heart rate, respiratory rate, oxygen saturations,                          blood pressure, adequacy of pulmonary ventilation, and                          response to care were monitored throughout the                          procedure. The physical status of the patient was                          re-assessed after the procedure.                          After obtaining informed consent, the endoscope was                          passed under direct vision. Throughout the procedure,                          the patient's blood pressure, pulse, and oxygen                          saturations were monitored continuously. The Endoscope                          was introduced through the mouth, and advanced to the                          second part of duodenum. The upper GI endoscopy was                          accomplished without difficulty. The patient tolerated                          the procedure well.                                                                                      Findings:        The examined esophagus was normal.        The Z-line was regular and was found 40 cm from the incisors.        Excessive fluid and solid food pieces were found in the gastric fundus        and body. This could not be cleared.        One non-bleeding superficial ulcer at the pyloric channel with no        stigmata of bleeding was found at the pylorus. The lesion was 7 mm in        largest dimension. There was inflammation (edema, erythema) surrounding        the ulcer.        There was evidence of an intact gastrostomy present on the anterior wall        of the stomach with J tube extension traversing the pylorus. The area        around gastric feeding tube was inspected with mild irritation and        erythema under the balloon (not unexpected with recent feeding tube        placement) but no ulcer or stigmata of bleeding.        Given the presence of feeding tube extending through the pylorus as well        as ulcer-related inflammation leading to some pyloric channel narrowing,        it did take some additional pressure to traverse the pylous, but the        adult endoscope was able to pass.        The duodenal bulb, first portion of the duodenum and second portion of        the duodenum were normal. J tube visualized in duodenum.                                                                                     Moderate Sedation:        Moderate (conscious) sedation was administered by the endoscopy nurse        and supervised by the endoscopist. The patient's oxygen saturation,        heart rate, blood pressure and response to care were monitored. Total        physician intraservice time was 16 minutes.   Impression:            Clean-based ulcer at pyloric channel is suspected                          source of bleeding. See above Findings for further                          details.                          - Normal esophagus.                          - Z-line regular, 40 cm from the  incisors.                          - Excessive gastric fluid and residual food.                          - Non-bleeding ulcer with surrounding inflammation at                          the pyloric channel with no stigmata of bleeding.                          - Intact gastrostomy with J-extension passing through                          pylorus.                          - Some increased pressure required to pass adult                          endoscope through pylorus in setting of inflammation                          and presence of J-tube.                          - Normal duodenal bulb, first portion of the duodenum                          and second portion of the duodenum.                          - No specimens collected.   Recommendation:        - Return patient to [facility] for ongoing care.                          - Strict NPO to allow time for stomach to empty given                          large residual gastric contents. Would avoid                          prokinetics at this time given concern for pyloric                          channel narrowing 2/2 inflammation.                          - Vent G tube to allow drainage of fluid/gas.                          - Continue pantoprazole 40 mg IV BID                          - Okay to resume anticoagulation tomorrow.                          - Repeat upper endoscopy in 8 weeks to check healing                          of ulcer. Pending clinical picture and endoscopic                          findings may need to consider pyloric dilation if                          stricture develops from current inflammation.                          - Disucssed findings with patient                          - Discussed findings and recommendations with primary                          medication team as well as pulmonary team taking care                          of patient.                                                                                        Ira Andres MD      Upper GI Endoscopy 01/25/2023 11:27 AM [address] [place] [address] [phone]     Endoscopy Department   _______________________________________________________________________________   Patient Name: Sofie Rodriguez            Procedure Date: 1/25/2023 11:27 AM   MRN: [id number]                       Account Number: [id number]   YOB: 1960               Admit Type: Outpatient   Age: 62                               Room: [place] [address]   Gender: Female                        Note Status: Finalized   Attending MD: OSKAR PÉREZ MD       Pause for the Cause: time out performed   Total Sedation Time:                     _______________________________________________________________________________       Procedure:             Upper GI endoscopy   Indications:           For therapy of gastroparesis; 62 year old female with                          a PMHx of end stage COPD s/p bilateral lung transplant                          on 6/28/22 complicated by acute limb ischemia of LUE                           s/p left radial thrombectomy, h/o C. Diff, h/o EBV                          viremia, ESRD on dialysis, hemobilia s/p ERCP                          presumably due to hemorrhage into gallbladder,                          referred for gastroparesis s/p PEGJ placement. History                          notable for a pyloric channel ulcer. Plan for a trial                          of Botox. GCSI= 3,0,0,3,2,2,3,4,4=21   Providers:             OSKAR PÉREZ MD, Jana Weller RN   Referring MD:             Requesting Provider:   MICHELE SOSA MD   Medicines:             Fentanyl 150 micrograms IV, Midazolam 4 mg IV   Complications:         No immediate complications. Estimated blood loss:                          Minimal.   _______________________________________________________________________________    Procedure:             Pre-Anesthesia Assessment:                          - Prior to the procedure, a History and Physical was                          performed, and patient medications and allergies were                          reviewed. The patient is competent. The risks and                          benefits of the procedure and the sedation options and                          risks were discussed with the patient. All questions                          were answered and informed consent was obtained.                          Patient identification and proposed procedure were                          verified by the physician and the nurse in the                          procedure room. Mental Status Examination: alert and                          oriented. Airway Examination: normal oropharyngeal                          airway and neck mobility. Respiratory Examination:                          clear to auscultation. CV Examination: normal.                          Prophylactic Antibiotics: The patient does not require                          prophylactic antibiotics. Prior Anticoagulants: The                          patient has taken no anticoagulant or antiplatelet                          agents. ASA Grade Assessment: III - A patient with                          severe systemic disease. After reviewing the risks and                          benefits, the patient was deemed in satisfactory                          condition to undergo the procedure. The anesthesia                          plan was to use moderate sedation / analgesia                          (conscious sedation). Immediately prior to                          administration of medications, the patient was                          re-assessed for adequacy to receive sedatives. The                          heart rate, respiratory rate, oxygen saturations,                          blood pressure, adequacy of pulmonary ventilation, and                           response to care were monitored throughout the                          procedure. The physical status of the patient was                          re-assessed after the procedure.                          After obtaining informed consent, the endoscope was                          passed under direct vision. Throughout the procedure,                          the patient's blood pressure, pulse, and oxygen                          saturations were monitored continuously. The Endoscope                          was introduced through the mouth, and advanced to the                          second part of duodenum. The upper GI endoscopy was                          accomplished without difficulty. The patient tolerated                          the procedure well.                                                                                     Findings:        The esophagus was normal.        There was evidence of an intact gastrostomy with a patent GJ-tube        present in the gastric antrum extending into the duodenum. This was        characterized by healthy appearing mucosa.        Localized nodular mucosa was found at the pylorus at the 11 o'clock        position corresponding to the location of her previously noted pyloric        channel ulcer.        Pylorus was otherwise normal. Pylorus was successfully injected with 200        units botulinum toxin divided into four quadrants in a total of 4 mL.        Estimated blood loss was minimal.        The examined duodenum was normal.                                                                                     Moderate Sedation:        Moderate (conscious) sedation was administered by the endoscopy nurse        and supervised by the endoscopist. The patient's oxygen saturation,        heart rate, blood pressure and response to care were monitored. Total        physician intraservice time was 10 minutes.   Impression:            -  Normal esophagus.                          - PEGJ tube in place                          - Nodular mucosa at the pylorus at the 11 o'clock                          position corresponding to the location of her                          previously noted pyloric channel ulcer consistent with                          granulation tissue. Ulcer no longer present                          - Pylorus injected with 200 units of Botox                          - Normal examined duodenum.                          - Today's GCSI= 3,0,0,3,2,2,3,4,4=21                          - No specimens collected.   Recommendation:        - Discharge patient to home (ambulatory).                          - Resume diet as tolerated today                          - GJ tube may be used immediately as before                          - In the future, G-POEM may still be possible along                          the 5 o'clock postion of the pylorus but may still run                          into significant scar tissue from prior ulceration.                          - Return to clinic in 1 month with Dr. Navarro to assess                          response.                                                                                       Gonzalez Navarro MD

## 2023-03-01 NOTE — LETTER
3/1/2023         RE: Sofie Rodriguez  [address] [place] [address]        Dear Colleague,    Thank you for referring your patient, Sofie Rodriguez, to the [facility]. Please see a copy of my visit note below.    INTERVENTIONAL ENDOSCOPY OUTPATIENT CLINIC CONSULT  DATE OF SERVICE: 03/01/23   PROVIDER REQUESTING CONSULT: Kaylin Triana PA-C  Reason for Consultation: gastroparesis     ASSESSMENT:  Sofie is a 62 year old female with a PMHx of end stage COPD s/p bilateral lung transplant on 6/28/22 complicated by acute limb ischemia of LUE s/p left radial thrombectomy, h/o C. Diff, h/o EBV viremia, ESRD on dialysis, hemobilia s/p ERCP presumably due to hemorrhage into gallbladder, referred for gastroparesis s/p PEGJ placement to discuss endoscopic options s/p pyloric botox injection on 1/25/23 here for follow up. Her GCSI total went from 21 to 12 and she notes symptomatic response. This makes it more likely she would respond to a more invasive intervention such as a G-POEM.    Slightly complicating the diagnosis of true post-surgical/vagal injury gastroparesis is the identification of a pyloric channel ulcer which creates gastric outlet obstruction through a separate mechanism although on her recent EGD that did not appear to be the case. A subset of patients will have spontaneous improvement over the course of a year following their surgery, but if gastroparesis persists beyond this then it is unlikely to resolve. Encouragingly, her gastric emptying study noted improvement in emptying from January 2023 compared to September 2022. She would like to wait and see how she feels after the one year remington before proceeding with a G-POEM which is very reasonable.    We discussed the G-POEM procedure in detail and reviewed what we currently know in terms of outcomes.    Donal Prseley et al.  Endoscopic pyloromyotomy for the treatment of severe and refractory gastroparesis: a ,  randomised, sham-controlled trial.  Gut vol. 71,11 (2022): 6014-0298.  Clinical treatment success rate was 71% after G-POEM versus 22% after sham (p=0.005).   Subgroups:  Diabetics 89% vs 17%  Postsurgical 50% vs 29%  Idiopathic 67% vs 20%    Median gastric retention at 4 hours decreased from 22% to 12% after G-POEM and did not change after sham: 26% versus 24%.    There does appear to be a drop off in clinical efficacy over time (recurrence of symptoms), but efficacy still remains reasonably high.     Sheela Antonio et al.  Long-term Outcome of Gastric Per-Oral Endoscopic Pyloromyotomy in Treatment of Gastroparesis.  Clinical gastroenterology and hepatology : the official clinical practice journal of the American Gastroenterological Association vol. 19,4 (2021): 816-824.    Gordo Sharma et al.  Gastric peroral endoscopic myotomy outcomes after 4 years of follow-up in a large cohort of patients with refractory gastroparesis (with video).  Gastrointestinal endoscopy vol. 96,3 (2022): 487-499.    We discussed the risks of the procedure which include bleeding, gastric leak/perforation, and ulceration at the mucosotomy site. After the procedure, I recommend overnight observation with an UGIS the following day to rule out leak and BID PPI for 30 days. I also recommend liquid diet prior to procedure and slow advancement of diet over the course of a week following the procedure.    We will re-evaluate in July    RECOMMENDATIONS:  - RTC in July 2023    Gonzalez Navarro MD  [facility]  Division of Gastroenterology and Hepatology  [address] [address]    ________________________________________________________________  HPI:  Sofie is a 62 year old female with a PMHx of end stage COPD s/p bilateral lung transplant on 6/28/22 complicated by acute limb ischemia of LUE s/p left radial thrombectomy, h/o C. Diff, h/o EBV viremia, ESRD on  dialysis, hemobilia s/p ERCP presumably due to hemorrhage into gallbladder, referred for gastroparesis s/p PEGJ placement to discuss endoscopic options.     PRIOR History:  She essentially developed gastroparesis following her lung transplant and underwent PEGJ placement by IR on 7/27/22. But also of note is development of a pyloric channel ulcer seen on an EGD on 8/11/22. She's been on BID PPI since then. Most recent gastric emptying study on 1/2/2023 shows persistent delayed emptying of 75% at 240 min. She continues on J-tube feeds but does eat sometimes for comfort. Eating can make her symptomatic with nausea, bloating/gas. She will periodically vent her G-tube about once a week, sometimes more frequently if she's been eating, when she has symptoms of bloating/gas and distension which helps. She has never been on Reglan. She still is on 18 hour J-tube feeds.     Interval History:  She underwent EGD with pyloric Botox injection pm 1/25/23. She notices an improvement following the procedure although still some residual symptoms. She thinks she is eating more by mouth now and venting less. She continues on her J-tube feeds. Certainly less nausea and bloating.    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     Hepatitis 2017    Hep C, [facility]     HTN (hypertension)      Lung infection 11/30/2022     Osteopenia      Patient Active Problem List   Diagnosis     COPD (chronic obstructive pulmonary disease) (H)     Abnormal findings on diagnostic imaging of cardiovascular system     Status post coronary angiogram     Hepatitis C, chronic (H)     S/P lung transplant (H)     Acute post-operative pain     Immunosuppressed status (H)     Acute kidney failure with tubular  necrosis (H)     Thrombus of left radial artery (H)     Diaphragm dysfunction     Paroxysmal A-fib (H)     Administration of long-term prophylactic antibiotics     EBV (Vibha-Barr virus) viremia     Acute pylorus ulcer     Hypogammaglobulinemia (H)     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)     Anemia     Gastrojejunostomy tube status (H)     Stage 3b chronic kidney disease (H)     Anemia of chronic renal failure, stage 3b (H)     Transplant rejection     Hemoptysis     Pulmonary edema     Lung infection       PSurgHx:  Past Surgical History:   Procedure Laterality Date     BRONCHOSCOPY (RIGID OR FLEXIBLE), DIAGNOSTIC N/A 8/2/2022    Procedure: BRONCHOSCOPY, DIAGNOSTIC- inspection Bronch;  Surgeon: Kamala Lovell MD;  Location:  GI     BRONCHOSCOPY (RIGID OR FLEXIBLE), DIAGNOSTIC N/A 9/13/2022    Procedure: INSPECTION BRONCHOSCOPY, WITH BRONCHOALVEOLAR LAVAGE;  Surgeon: Jose R Mccullough MD;  [facility]     BRONCHOSCOPY (RIGID OR FLEXIBLE), DIAGNOSTIC N/A 11/9/2022    Procedure: BRONCHOSCOPY, WITH BRONCHOALVEOLAR LAVAGE AND BIOPSY;  Surgeon: Cesar Lima MD;  [facility]     BRONCHOSCOPY (RIGID OR FLEXIBLE), DIAGNOSTIC N/A 1/25/2023    Procedure: BRONCHOSCOPY, WITH BRONCHOALVEOLAR LAVAGE AND BIOPSY;  Surgeon: Mason Reddy MD;  [facility]     BRONCHOSCOPY FLEXIBLE AND RIGID N/A 07/19/2022    Procedure: BRONCHOSCOPY inspection only;  Surgeon: Bob Liao MD;  Location:  GI     COLONOSCOPY  2015     CV CORONARY ANGIOGRAM N/A 06/30/2021    Procedure: CV CORONARY ANGIOGRAM;  Surgeon: Alexander Cuellar MD;  Location:  HEART CARDIAC CATH LAB     CV RIGHT HEART CATH MEASUREMENTS RECORDED N/A 06/30/2021    Procedure: CV RIGHT HEART CATH;  Surgeon: Alexander Cuellar MD;  Location:  HEART CARDIAC CATH LAB     ENDOSCOPIC RETROGRADE CHOLANGIOPANCREATOGRAM N/A 8/11/2022    Procedure: ENDOSCOPIC RETROGRADE CHOLANGIOPANCREATOGRAPHY WITH PANCREATIC DUCT NEEDLE  KNIFE AND STENT PLACEMENT, BILE DUCT SPHINCTEROTOMY, BLOOD/DEBRIS REMOVAL AND STENT PLACEMENT;  Surgeon: Cosmo Arroyo MD;  [facility]     ENDOSCOPIC RETROGRADE CHOLANGIOPANCREATOGRAM N/A 10/7/2022    Procedure: ENDOSCOPIC RETROGRADE CHOLANGIOPANCREATOGRAPHY with biliary and pancreatic stent removal, debris removal;  Surgeon: Cosmo Arroyo MD;  [facility]     ENT SURGERY  1974    tonsillectomy     ENTEROSCOPY SMALL BOWEL N/A 8/11/2022    Procedure: SMALL BOWEL ENTEROSCOPY;  Surgeon: Cosmo Arroyo MD;  [facility]     ESOPHAGOGASTRODUODENOSCOPY, WITH NASOGASTRIC TUBE INSERTION N/A 07/01/2022    Procedure: ESOPHAGOGASTRODUODENOSCOPY, WITH NASOJEJUNAL TUBE INSERTION;  Surgeon: Ozzy Nickerson MD;  [facility]     ESOPHAGOSCOPY, GASTROSCOPY, DUODENOSCOPY (EGD), COMBINED N/A 8/3/2022    Procedure: ESOPHAGOGASTRODUODENOSCOPY (EGD);  Surgeon: Ira Andres MD;  Location:  GI     ESOPHAGOSCOPY, GASTROSCOPY, DUODENOSCOPY (EGD), COMBINED N/A 1/25/2023    Procedure: ESOPHAGOGASTRODUODENOSCOPY (EGD) with botox injection;  Surgeon: Gonzalez Navarro MD;  Location:  GI     HAND SURGERY       INSERT CHEST TUBE Right 9/13/2022    Procedure: Insert chest tube;  Surgeon: Jose R Mccullough MD;  [facility]     IR CVC TUNNEL PLACEMENT > 5 YRS OF AGE  9/26/2022     IR GASTRO JEJUNOSTOMY TUBE CHANGE  8/31/2022     IR GASTRO JEJUNOSTOMY TUBE CHANGE  12/21/2022     IR GASTRO JEJUNOSTOMY TUBE PLACEMENT  7/27/2022     IR THORACENTESIS  8/29/2022     LEEP TX, CERVICAL  04/07/2017    HECTOR III     LYMPH NODE BIOPSY Left 2005    Left axilla, benign- [place]     MIDLINE INSERTION - DOUBLE LUMEN Left 07/28/2022    20cm, Basilic vein     REPLACE GASTROJEJUNOSTOMY TUBE, PERCUTANEOUS  10/7/2022    Procedure: Replace Gastrojejunostomy Tube;  Surgeon: Cosmo Arroyo MD;  [facility]     THORACENTESIS Left 8/29/2022    Procedure: THORACENTESIS;  Surgeon: Bo Capone  FLOYD Santoyo;  Location: [facility] OR     THORACENTESIS Left 9/13/2022    Procedure: Thoracentesis;  Surgeon: Jose R Mccullough MD;  [facility]     THROMBECTOMY UPPER EXTREMITY Left 07/02/2022    Procedure: LEFT RADIAL ARM THROMBECTOMY;  Surgeon: Christie Graham MD;  [facility]     TRANSPLANT LUNG RECIPIENT SINGLE X2 Bilateral 06/28/2022    Procedure: Clamshell Incision, Bilateral Sequential Lung Transplant, On Cardiopulmonary Bypass, Flexible Bronchoscopy;  Surgeon: Sue Sunshine MD;  [facility]       MEDS:  Current Outpatient Medications   Medication     alcohol swab prep pads     azaTHIOprine (IMURAN) 5 mg/mL SUSP     B Complex-C-Folic Acid (DIALYVITE) TABS     blood glucose (CONTOUR NEXT TEST) test strip     blood glucose (NO BRAND SPECIFIED) lancets standard     blood glucose monitoring (CONTOUR NEXT MONITOR W/DEVICE KIT) meter device kit     calcium carbonate 600 mg-vitamin D 400 units (CALTRATE) 600-400 MG-UNIT per tablet     cyanocobalamin (CYANOCOBALAMIN) 500 MCG tablet     dapsone 2 mg/mL SUSP     Guar Gum (FIBER MODULAR, NUTRISOURCE FIBER,) packet     insulin pen needle (32G X 4 MM) 32G X 4 MM miscellaneous     loperamide (IMODIUM A-D) 2 MG tablet     metoprolol tartrate (LOPRESSOR) 50 MG tablet     Nutritional Supplements (GLUCOSE MANAGEMENT) TABS     pantoprazole (PROTONIX) 2 mg/mL SUSP suspension     predniSONE (DELTASONE) 5 MG tablet     protein modular (PROSOURCE TF) LIQD     Sharps Container MISC     tacrolimus (GENERIC EQUIVALENT) 1 mg/mL suspension     No current facility-administered medications for this visit.     ALLERGIES:    Allergies   Allergen Reactions     Blood Transfusion Related (Informational Only) Other (See Comments)     Patient has a history of a clinically significant antibody against RBC antigens.  A delay in compatible RBCs may occur.      FHx: Noncontributory    SOCIAL Hx:  Social History     Socioeconomic History     Marital status: Single     Spouse  "name: Not on file     Number of children: Not on file     Years of education: Not on file     Highest education level: Not on file   Occupational History     Not on file   Tobacco Use     Smoking status: Former     Years: 30.00     Types: Cigarettes     Quit date: 2020     Years since quittin.3     Smokeless tobacco: Never   Substance and Sexual Activity     Alcohol use: Not Currently     Drug use: Not Currently     Types: Marijuana, Methamphetamines     Comment: hx:marijuana and methamphetamine-quit both unsure ?  2-3 yrs ago     Sexual activity: Not on file   Other Topics Concern     Parent/sibling w/ CABG, MI or angioplasty before 65F 55M? Not Asked   Social History Narrative     Not on file     Social Determinants of Health     Financial Resource Strain: Not on file   Food Insecurity: Not on file   Transportation Needs: Not on file   Physical Activity: Not on file   Stress: Not on file   Social Connections: Not on file   Intimate Partner Violence: Not on file   Housing Stability: Not on file       ROS: A comprehensive Review of Systems was asked and answered in the negative unless specifically commented upon in the HPI    Physical Exam  Ht 1.575 m (5' 2\")   Wt 64.4 kg (142 lb)   BMI 25.97 kg/m    Body mass index is 25.97 kg/m .  Gen: A&Ox3, NAD  HEENT: Moist mucus membranes, no scleral icterus.  Lungs: no respiratory distress      LABS:  External Order Results on 2022   Component Date Value Ref Range Status     Sodium (External) 2022 137  136 - 146 mmol/L Final     Potassium (External) 2022 4.1  3.5 - 5.1 mmol/L Final     Chloride (External) (External) 2022 97 (L)  98 - 107 mmol/L Final     CO2 (External) 2022 27  22 - 29 mmol/L Final     Anion Gap (External) 2022 17.1  10.0 - 20.0 mmol/L Final     Creatinine (External) 2022 4.34  mg/dL Final     Urea Nitrogen (External) 2022 43.6 (H)  10.0 - 20.0 mg/dL Final     BUN/Creatinine Ratio (External) " 12/30/2022 10.05 (L)  11.70 - 22.90 ratio Final     Calcium (External) 12/30/2022 9.5  8.6 - 10.5 mg/dL Final     Glucose (External) 12/30/2022 102 (H)  70 - 100 mg/dL Final     GFR Estimated (External) 12/30/2022 11 (L)  >=60 ml/min/1.73m2 Final     Magnesium (External) 12/30/2022 2.5  1.8 - 2.6 mg/dL Final     WBC Count (External) 12/30/2022 4.7  10(3)/uL Final     RBC Count (External) 12/30/2022 2.81  10(6)/uL Final     Hemoglobin (External) 12/30/2022 10.3  g/dL Final     Hematocrit (External) 12/30/2022 31.1  % Final     MCV (External) 12/30/2022 110.7  fL Final     MCH (External) 12/30/2022 36.5  pg Final     MCHC (External) 12/30/2022 33.0  32.0 - 36.0 g/dL Final     RDW (External) 12/30/2022 16.2  % Final     Platelet Count (External) 12/30/2022 251  179 - 450 10(3)/uL Final         IMAGING:  EXAM:  NM GASTRIC EMPTYING, 1/2/2023 1:21 PM  HISTORY: Gastroparesis; Lung replaced by transplant (H)     TECHNIQUE: The patient ingested 2.1 mCi of Tc-99m sulfur colloid in 2  eggs, 2 pieces of toast w/ jelly. Static images were acquired at  approximately 1 hour intervals out through 4 hours using a dual head  gamma camera in an anterior and posterior position. The calculation of  emptying was based on dual head imaging with geometric mean  calculations.  A Linear square fit was calculated to the emptying  curve to determine the amount of residual activity at each time point.     FINDINGS:   Percent retention at 60 min = 87%.  Percent retention at 120 min = 77%.  Percent retention at 180 min = 77%.  Percent retention at 240 min = 75%.     T 1/2 emptying time =  238 mins.                                                                      IMPRESSION: Delayed gastric emptying    EXAM:  NM GASTRIC EMPTYING, 9/30/2022 12:32 PM     HISTORY: severe delayed gastric emptying for follow up; is off  dialysis. NPO weds night     TECHNIQUE: The patient ingested 2.3 mCi of Tc-99m sulfur colloid in in  2 eggs, 1 slices of toast  with jelly, and a glass of water. Static  images were acquired at approximately 1 hour intervals out through 4  hours using a dual head gamma camera in an anterior and posterior  position. The calculation of emptying was based on dual head imaging  with geometric mean calculations.  A Linear square fit was calculated  to the emptying curve to determine the amount of residual activity at  each time point.     FINDINGS:   Percent retention at 60 min = 97%.  Percent retention at 120 min = 95%.  Percent retention at 180 min = 91%.  Percent retention at 240 min = 91%.     T 1/2 emptying time =  Too long to calculate.                                                                      IMPRESSION: Severe delayed gastric emptying    Endoscopies:  Upper GI Endoscopy 08/03/2022 11:49 AM [address] [address] [phone]     Endoscopy Department   _______________________________________________________________________________   Patient Name: Sofie Rodriguez            Procedure Date: 8/3/2022 11:49 AM   MRN: [id number]                       Account Number: [id number]   YOB: 1960               Admit Type: Inpatient   Age: 62                               Room: [place] [address]   Gender: Female                        Note Status: Finalized   Attending MD: GREGORIO CARSON MD Pause for the Cause: pause for    cause done   Total Sedation Time: 16 minutes         _______________________________________________________________________________       Procedure:             Upper GI endoscopy   Indications:           Coffee-ground emesis   Providers:             GREGORIO CARSON MD, Josefina CANALES, RIYA, Lashonda Murrell RN   Patient Profile:       Ms. Jimenez is a 62 year old female with end stage COPD                          s/p bilateral lung transplant (6/28/22) complicated by                           acute limb ischemia of LUE requiring left radial                          thrombectomy, EBV viremia and C.diff (vancomycin                          7/12/22-7/28/22). She was found to have delayed                          gastric emptying on GES and underwent percutaneous GJ                          tube placement by IR on 7/27/22. GI consulted for                          development of dark G tube output concerning for upper                          GI bleed.   Referring MD:             Medicines:             Midazolam 3 mg IV, Fentanyl 100 micrograms IV   Complications:         No immediate complications.   _______________________________________________________________________________   Procedure:             Pre-Anesthesia Assessment:                          - Prior to the procedure, a History and Physical was                          performed, and patient medications and allergies were                          reviewed. The patient is competent. The risks and                          benefits of the procedure and the sedation options and                          risks were discussed with the patient. All questions                          were answered and informed consent was obtained.                          Patient identification and proposed procedure were                          verified by the physician and the nurse in the                          procedure room. Mental Status Examination: normal.                          Airway Examination: normal oropharyngeal airway and                          neck mobility and Mallampati Class III (part of the                          uvula and soft palate visualized). Respiratory                          Examination: clear to auscultation. CV Examination:                          normal. Prophylactic Antibiotics: The patient does not                          require prophylactic antibiotics. Prior                          Anticoagulants: The patient has  taken no anticoagulant                          or antiplatelet agents. ASA Grade Assessment: III - A                          patient with severe systemic disease. After reviewing                          the risks and benefits, the patient was deemed in                          satisfactory condition to undergo the procedure. The                          anesthesia plan was to use moderate sedation /                          analgesia (conscious sedation). Immediately prior to                          administration of medications, the patient was                          re-assessed for adequacy to receive sedatives. The                          heart rate, respiratory rate, oxygen saturations,                          blood pressure, adequacy of pulmonary ventilation, and                          response to care were monitored throughout the                          procedure. The physical status of the patient was                          re-assessed after the procedure.                          After obtaining informed consent, the endoscope was                          passed under direct vision. Throughout the procedure,                          the patient's blood pressure, pulse, and oxygen                          saturations were monitored continuously. The Endoscope                          was introduced through the mouth, and advanced to the                          second part of duodenum. The upper GI endoscopy was                          accomplished without difficulty. The patient tolerated                          the procedure well.                                                                                     Findings:        The examined esophagus was normal.        The Z-line was regular and was found 40 cm from the incisors.        Excessive fluid and solid food pieces were found in the gastric fundus        and body. This could not be cleared.        One non-bleeding superficial  ulcer at the pyloric channel with no        stigmata of bleeding was found at the pylorus. The lesion was 7 mm in        largest dimension. There was inflammation (edema, erythema) surrounding        the ulcer.        There was evidence of an intact gastrostomy present on the anterior wall        of the stomach with J tube extension traversing the pylorus. The area        around gastric feeding tube was inspected with mild irritation and        erythema under the balloon (not unexpected with recent feeding tube        placement) but no ulcer or stigmata of bleeding.        Given the presence of feeding tube extending through the pylorus as well        as ulcer-related inflammation leading to some pyloric channel narrowing,        it did take some additional pressure to traverse the pylous, but the        adult endoscope was able to pass.        The duodenal bulb, first portion of the duodenum and second portion of        the duodenum were normal. J tube visualized in duodenum.                                                                                     Moderate Sedation:        Moderate (conscious) sedation was administered by the endoscopy nurse        and supervised by the endoscopist. The patient's oxygen saturation,        heart rate, blood pressure and response to care were monitored. Total        physician intraservice time was 16 minutes.   Impression:            Clean-based ulcer at pyloric channel is suspected                          source of bleeding. See above Findings for further                          details.                          - Normal esophagus.                          - Z-line regular, 40 cm from the incisors.                          - Excessive gastric fluid and residual food.                          - Non-bleeding ulcer with surrounding inflammation at                          the pyloric channel with no stigmata of bleeding.                          - Intact gastrostomy with  J-extension passing through                          pylorus.                          - Some increased pressure required to pass adult                          endoscope through pylorus in setting of inflammation                          and presence of J-tube.                          - Normal duodenal bulb, first portion of the duodenum                          and second portion of the duodenum.                          - No specimens collected.   Recommendation:        - Return patient to [facility] for ongoing care.                          - Strict NPO to allow time for stomach to empty given                          large residual gastric contents. Would avoid                          prokinetics at this time given concern for pyloric                          channel narrowing 2/2 inflammation.                          - Vent G tube to allow drainage of fluid/gas.                          - Continue pantoprazole 40 mg IV BID                          - Okay to resume anticoagulation tomorrow.                          - Repeat upper endoscopy in 8 weeks to check healing                          of ulcer. Pending clinical picture and endoscopic                          findings may need to consider pyloric dilation if                          stricture develops from current inflammation.                          - Disucssed findings with patient                          - Discussed findings and recommendations with primary                          medication team as well as pulmonary team taking care                          of patient.                                                                                       Ira Andres MD      Upper GI Endoscopy 01/25/2023 11:27 AM [facility]   [address] [place] [address] [phone]     Endoscopy Department   _______________________________________________________________________________    Patient Name: Sofie Rodriguez            Procedure Date: 1/25/2023 11:27 AM   MRN: [id number]                       Account Number: [id number]   YOB: 1960               Admit Type: Outpatient   Age: 62                               Room: [place] [address]   Gender: Female                        Note Status: Finalized   Attending MD: OSKAR PÉREZ MD       Pause for the Cause: time out performed   Total Sedation Time:                     _______________________________________________________________________________       Procedure:             Upper GI endoscopy   Indications:           For therapy of gastroparesis; 62 year old female with                          a PMHx of end stage COPD s/p bilateral lung transplant                          on 6/28/22 complicated by acute limb ischemia of LUE                           s/p left radial thrombectomy, h/o C. Diff, h/o EBV                          viremia, ESRD on dialysis, hemobilia s/p ERCP                          presumably due to hemorrhage into gallbladder,                          referred for gastroparesis s/p PEGJ placement. History                          notable for a pyloric channel ulcer. Plan for a trial                          of Botox. GCSI= 3,0,0,3,2,2,3,4,4=21   Providers:             OSKAR PÉREZ MD, Jana Weller RN   Referring MD:             Requesting Provider:   MICHELE SOSA MD   Medicines:             Fentanyl 150 micrograms IV, Midazolam 4 mg IV   Complications:         No immediate complications. Estimated blood loss:                          Minimal.   _______________________________________________________________________________   Procedure:             Pre-Anesthesia Assessment:                          - Prior to the procedure, a History and Physical was                          performed, and patient medications and allergies were                          reviewed. The patient is competent. The risks and                           benefits of the procedure and the sedation options and                          risks were discussed with the patient. All questions                          were answered and informed consent was obtained.                          Patient identification and proposed procedure were                          verified by the physician and the nurse in the                          procedure room. Mental Status Examination: alert and                          oriented. Airway Examination: normal oropharyngeal                          airway and neck mobility. Respiratory Examination:                          clear to auscultation. CV Examination: normal.                          Prophylactic Antibiotics: The patient does not require                          prophylactic antibiotics. Prior Anticoagulants: The                          patient has taken no anticoagulant or antiplatelet                          agents. ASA Grade Assessment: III - A patient with                          severe systemic disease. After reviewing the risks and                          benefits, the patient was deemed in satisfactory                          condition to undergo the procedure. The anesthesia                          plan was to use moderate sedation / analgesia                          (conscious sedation). Immediately prior to                          administration of medications, the patient was                          re-assessed for adequacy to receive sedatives. The                          heart rate, respiratory rate, oxygen saturations,                          blood pressure, adequacy of pulmonary ventilation, and                          response to care were monitored throughout the                          procedure. The physical status of the patient was                          re-assessed after the procedure.                          After obtaining informed consent, the endoscope was                           passed under direct vision. Throughout the procedure,                          the patient's blood pressure, pulse, and oxygen                          saturations were monitored continuously. The Endoscope                          was introduced through the mouth, and advanced to the                          second part of duodenum. The upper GI endoscopy was                          accomplished without difficulty. The patient tolerated                          the procedure well.                                                                                     Findings:        The esophagus was normal.        There was evidence of an intact gastrostomy with a patent GJ-tube        present in the gastric antrum extending into the duodenum. This was        characterized by healthy appearing mucosa.        Localized nodular mucosa was found at the pylorus at the 11 o'clock        position corresponding to the location of her previously noted pyloric        channel ulcer.        Pylorus was otherwise normal. Pylorus was successfully injected with 200        units botulinum toxin divided into four quadrants in a total of 4 mL.        Estimated blood loss was minimal.        The examined duodenum was normal.                                                                                     Moderate Sedation:        Moderate (conscious) sedation was administered by the endoscopy nurse        and supervised by the endoscopist. The patient's oxygen saturation,        heart rate, blood pressure and response to care were monitored. Total        physician intraservice time was 10 minutes.   Impression:            - Normal esophagus.                          - PEGJ tube in place                          - Nodular mucosa at the pylorus at the 11 o'clock                          position corresponding to the location of her                          previously noted pyloric channel ulcer consistent with                           granulation tissue. Ulcer no longer present                          - Pylorus injected with 200 units of Botox                          - Normal examined duodenum.                          - Today's GCSI= 3,0,0,3,2,2,3,4,4=21                          - No specimens collected.   Recommendation:        - Discharge patient to home (ambulatory).                          - Resume diet as tolerated today                          - GJ tube may be used immediately as before                          - In the future, G-POEM may still be possible along                          the 5 o'clock postion of the pylorus but may still run                          into significant scar tissue from prior ulceration.                          - Return to clinic in 1 month with Dr. Navarro to assess                          response.                                                                                       Gonzalez Navarro MD

## 2023-03-02 LAB
DONOR IDENTIFICATION: NORMAL
DQB2: 8915
DSA COMMENTS: NORMAL
DSA PRESENT: YES
DSA TEST METHOD: NORMAL
ORGAN: NORMAL
SA 1 CELL: NORMAL
SA 1 TEST METHOD: NORMAL
SA 2 CELL: NORMAL
SA 2 TEST METHOD: NORMAL
SA1 HI RISK ABY: NORMAL
SA1 MOD RISK ABY: NORMAL
SA2 HI RISK ABY: NORMAL
SA2 MOD RISK ABY: NORMAL
UNACCEPTABLE ANTIGENS: NORMAL
UNOS CPRA: 37
ZZZSA 1  COMMENTS: NORMAL
ZZZSA 2 COMMENTS: NORMAL

## 2023-03-02 NOTE — PATIENT INSTRUCTIONS
You will find a brief summary of your discussion and care plan from today's visit below.  Dr Navarro has outlined the following steps after your recent clinic visit:    Recommendations:     - Clinic follow up in July to reassess symptoms and decide if we need to proceed with a G-POEM. You should be getting a call to schedule this.     Please call with any questions or concerns regarding your clinic visit today.     It is a pleasure being involved in your health care.     Contacts post-consultation depending on your need:     Schedule Clinic Appointments                        [phone], option #5   M-F 7:30 - 5 pm    Teresa Vogel, RN Care Coordinator           [phone]    Amalia Pringle RN Care Coordinator              [phone]     Kathie Rojo, RN Care Coordinator          [phone]     OR Procedure Scheduling                              [phone]     For urgent/emergent questions after business hours, you may reach the on-call GI Fellow by contacting the [facility]  at [phone].     How do I schedule labs, imaging studies, or procedures that were ordered in clinic today?      Labs: To schedule lab appointment at the [facility] and [facility], use my chart or call [phone]. If you have a [place] lab closer to home where you are regularly seen you can give them a call.      Procedures: If a colonoscopy, upper endoscopy, breath test, esophageal manometry, or pH impedence was ordered today, our endoscopy team will call you to schedule this. If you have not heard from our endoscopy team within a week, please call [phone] to schedule.      Imaging Studies: If you were scheduled for a CT scan, X-ray, MRI, ultrasound, HIDA scan or other imaging study, please call [phone] to have this scheduled.      Referral: If a referral to another specialty was ordered, expect a phone call or follow instructions above. If you have not heard from anyone regarding your referral in  a week, please call our clinic to check the status.      How to I schedule a follow-up visit?  If you did not schedule a follow-up visit today, please call [phone] option #5 to schedule a follow-up office visit.      I recommend signing up for Health Benefits Direct access if you have not already done so and are comfortable with using a computer.  This allows for online access to your lab results and also helps you communicate efficiently with the clinic should any questions arise in your care.

## 2023-03-06 ENCOUNTER — TELEPHONE (OUTPATIENT)
Dept: TRANSPLANT | Facility: CLINIC | Age: 63
End: 2023-03-06
Payer: MEDICARE

## 2023-03-06 ENCOUNTER — TELEPHONE (OUTPATIENT)
Dept: PULMONOLOGY | Facility: CLINIC | Age: 63
End: 2023-03-06
Payer: MEDICARE

## 2023-03-06 ENCOUNTER — TELEPHONE (OUTPATIENT)
Dept: MULTI SPECIALTY CLINIC | Facility: CLINIC | Age: 63
End: 2023-03-06
Payer: MEDICARE

## 2023-03-06 NOTE — LETTER
PHYSICIAN ORDERS      DATE & TIME ISSUED: 2023 12:41 PM  PATIENT NAME: Sofie Rodriguez   : 1960     [facility] MR# [if applicable]: [id number]     DIAGNOSIS:  Lung Transplant  Z94.2  Please collect c-difficile stool sample and Enteric bacterial and virus panel by SHERI stool.     Any questions please call: Girma [phone]    Please fax these results to [phone].         Claribel Franks MD  Pulmonary Disease

## 2023-03-06 NOTE — LETTER
PHYSICIAN ORDERS      DATE & TIME ISSUED: 2023 2:54 PM  PATIENT NAME: Sofie Rodriguez. Phone: [phone]  : 1960     [facility] MR# [if applicable]: [id number]     DIAGNOSIS:  Lung Transplant  Z94.2  Please call patient and schedule CT abdomen and non-contrast chest CT on 3/6/2023      Any questions please call: Girma [phone]    Please fax these results to [phone].         Claribel Franks MD  Pulmonary Disease

## 2023-03-06 NOTE — LETTER
PHYSICIAN ORDERS      DATE & TIME ISSUED: 2023 12:39 PM  PATIENT NAME: Sofie Rodriguez   : 1960     [facility] MR# [if applicable]: [id number]     DIAGNOSIS:  Lung Transplant  Z94.2    Please draw bmp, mag, phos, cbc with platelets, EBV DNA Quantitative, and CMV Quantitative PCR week of 3/6/2023    Any questions please call: Girma [phone]    Please fax these results to [phone].         Claribel Franks MD  Pulmonary Disease

## 2023-03-06 NOTE — TELEPHONE ENCOUNTER
"Patient sent My Chart message: has been having diarrhea, nauseous Saturday AM, temp 100.1 today     Writer called patient:     Diarrhea started Friday night. Get's better during the day, worse at night. Going roughly 10x daily.   Hasn't eaten anything since Friday night. Has been able to keep all medications down. Has not actually thrown up.   Denies starting any new medications or dietary changes.     Hasn't drank anything, took a couple sips of water last night.   No SOB. BP this morning 144/71, HR 92.  Temperature this morning 100.6.     Reviewed with Dr. Franks:   -patient to be evaluated in ER. Will go to local ER, [facility]         Addendum: 3/6  Pt refusing to stay in ED. Sent MyChart stating \" So I'm at the hospital. I've had my vitals taken and my temp is only 99.6.     Instead of sitting here and waiting like most of the day, would it be ok for me to just go home and go get lab work done at [facility]?      I don't think I should be sitting around with all these sick people!\"    \" just decided that I'm going to leave.   I don't want to be here with these sick people and I am feeling better.   I can still get labs drawn if you want. But I'm leaving here.\"    Writer called patient and explained with her being immunosuppressed, a temperature of 99.6 is likely still a fever. There is likely still an infectious process at work right now. Pt addiment about leaving ED. Daughter coming to pick her up.     Plan for patient to get labs and stool studies done at [facility]. Orders faxed.   Per Dr. Franks: CT abdomen and non-contrast chest CT. Orders faxed.     Pt instructed if her temp goes above 100 or she has worsening/new symptoms, she needs to present back to ED for evaluation.       "

## 2023-03-07 ENCOUNTER — TELEPHONE (OUTPATIENT)
Dept: TRANSPLANT | Facility: CLINIC | Age: 63
End: 2023-03-07
Payer: MEDICARE

## 2023-03-07 NOTE — TELEPHONE ENCOUNTER
Received a phone call from [facility] regarding abnormal labs for Sofie Rodriguez. Critical labs included creatinine 8.99, .    Briefly, Sofie is a 62 year old woman status post bilateral lung transplant on 6/28/22 for COPD complicated by persistent bilateral pleural effusions, hypercapnea, right hemidiaphragm palsy, C. diff colitis, gastroparesis s/p GJ tube placement, GI bleed 2/2 pyloric ulcer, hemobilia s/p ERCP and MRCP, ESRD on iHD T/Th/Sat, admission November 2022 for pneumonia and influenza A in mid December. Other history notable for HFpEF, NTM colonoization, hepatitis C, and methamphetamine use.    She contacted the Transplant Clinic earlier today endorsing several days of feeling unwell (since Friday) with symptoms including temperature 100.1, nausea without vomiting, decreased oral intake and diarrhea (loose stools multiple times per day, up to 10/day). She initially presented to the local ED in [place] but left after her vitals were taken as she was reportedly feeling better and didn't want to be exposed to other sick patients. Lab work including BMP was obtained.    I contacted the patient to discuss the abnormal labs results. She notes having missed her regular dialysis session on Saturday, last dialysis run was on Thursday. Based on her ongoing symptoms, abnormal BMP, and missed dialysis session I recommended she present back to the local ED for evaluation. She agreed with this plan.    Recommendations:  - local ED evaluation   - tacrolimus level (drawn at 2000, before next scheduled dose)   - CT abdomen/pelvis   - noncontrast CT chest   - GI pathogen panel including C. Difficile PCR   - remainder of workup per ED provider    Ashish Lin M.D.  Pulmonary and Critical Care Medicine Fellow  3/6/2023

## 2023-03-07 NOTE — TELEPHONE ENCOUNTER
Pt admitted to [facility] yesterday for worsening labs.     Intitial workup for labs, chest CT/abd, stool studies.     Bedside RN given contact information for transplant provider on-call if they have any questions or concerns.

## 2023-03-07 NOTE — TELEPHONE ENCOUNTER
Contacted through the  by RABIA Fragoso from [facility] in regards to this patient.  Sofie presented to the ED following recommendations received earlier tonight from Dr. Lin.  She was also in the ED earlier today per recommendations from Dr. Franks.  Please see their notes for additional details.   Per their notes, and ED provider report, Sofie has been feeling unwell for several days now and has had fever, nausea, vomiting and diarrhea.  Recommended patient be admitted locally tonight to continue work up given immunosuppressed status.  Encouraged them to check in with the lung transplant team in the morning for additional recommendations and consideration of transfer here, if indicated.    Kera Broussard MD  Pulmonary and Critical Care Medicine

## 2023-03-09 ENCOUNTER — TRANSFERRED RECORDS (OUTPATIENT)
Dept: HEALTH INFORMATION MANAGEMENT | Facility: CLINIC | Age: 63
End: 2023-03-09
Payer: MEDICARE

## 2023-03-17 ENCOUNTER — LAB (OUTPATIENT)
Dept: LAB | Facility: CLINIC | Age: 63
End: 2023-03-17
Payer: MEDICARE

## 2023-03-17 ENCOUNTER — APPOINTMENT (OUTPATIENT)
Dept: LAB | Facility: CLINIC | Age: 63
End: 2023-03-17
Payer: MEDICARE

## 2023-03-17 DIAGNOSIS — D80.1 HYPOGAMMAGLOBULINEMIA (H): ICD-10-CM

## 2023-03-17 DIAGNOSIS — Z94.2 S/P LUNG TRANSPLANT (H): ICD-10-CM

## 2023-03-17 PROCEDURE — 80197 ASSAY OF TACROLIMUS: CPT

## 2023-03-18 LAB
TACROLIMUS BLD-MCNC: 11 UG/L (ref 5–15)
TME LAST DOSE: NORMAL H
TME LAST DOSE: NORMAL H

## 2023-03-20 NOTE — RESULT ENCOUNTER NOTE
Tacrolimus level 11.0 at 12.5 hours, on 3/17/23.  Goal 8-12.   Current dose 3 mg in AM, 3 mg in PM    No change in dose   ENDYMION message sent

## 2023-03-22 LAB
ACID FAST STAIN (ARUP): NORMAL

## 2023-03-29 ENCOUNTER — TELEPHONE (OUTPATIENT)
Dept: TRANSPLANT | Facility: CLINIC | Age: 63
End: 2023-03-29
Payer: MEDICARE

## 2023-03-29 NOTE — TELEPHONE ENCOUNTER
"Pt reports testing positive for covid today. Symptoms started Sunday.   Pt sends [employer] message stating the following symptoms:    \"my symptoms are a slight cough with mucus sometimes of a milky color, runny nose, my oxygen level has been between 93 and 95, I feel slightly winded when moving around otherwise my breathing feels good. This morning I had no temp for the first time since Sunday\"    Pt instructed to let RNCC if symptoms worsen at all and to monitor oxygen saturations multiple times throughout the day and if symptomatic at all. Patient understands if oxygen saturations start dipping down to 90-91% or less she needs to be evaluated in ER.     Will consult with Dr. rFanks for treatment options.   -plan to treat with Remdesivir locally   [facility] does not do IV Remdesivir. [facility] also does not IV remdesivir.  - Patient unable to due paxlovid due to being in HD. Plan for Molnupiravir to to treat             "

## 2023-03-31 ENCOUNTER — LAB (OUTPATIENT)
Dept: LAB | Facility: CLINIC | Age: 63
End: 2023-03-31
Payer: MEDICARE

## 2023-03-31 DIAGNOSIS — Z94.2 LUNG REPLACED BY TRANSPLANT (H): Primary | ICD-10-CM

## 2023-03-31 DIAGNOSIS — Z94.2 S/P LUNG TRANSPLANT (H): ICD-10-CM

## 2023-03-31 PROCEDURE — 80197 ASSAY OF TACROLIMUS: CPT | Performed by: PHYSICIAN ASSISTANT

## 2023-03-31 NOTE — LETTER
PHYSICIAN ORDERS      DATE & TIME ISSUED: 2023 3:49 PM  PATIENT NAME: Sofie Rodriguez   : 1960     [facility] MR# [if applicable]: [id number]     DIAGNOSIS:  Lung Transplant  Z94.2    Please draw bmp, mag, cbc with platelets, tacrolimus level, and INR .    Any questions please call: Girma [phone]    Please fax these results to [phone].         Claribel Franks MD  Pulmonary Disease

## 2023-03-31 NOTE — PROGRESS NOTES
Orders for pre-bronch labs faxed to [facility] lab to be drawn on April 18th.

## 2023-04-01 LAB
TACROLIMUS BLD-MCNC: 14.8 UG/L (ref 5–15)
TME LAST DOSE: NORMAL H
TME LAST DOSE: NORMAL H

## 2023-04-03 DIAGNOSIS — Z94.2 S/P LUNG TRANSPLANT (H): ICD-10-CM

## 2023-04-03 NOTE — PROGRESS NOTES
Tacrolimus level 14.8 at 12 hours, on 3/31/23.  Goal 8-12.   Current dose 3 mg in AM, 3 mg in PM    Dose changed to 3 mg in AM, 2.5 mg in PM   Recheck level in 10 days    Discussed with patient    MyChart message sent

## 2023-04-03 NOTE — PROGRESS NOTES
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 diff colitis, gastroparesis s/p GJ tube placement now s/p EGD and botox, GI bleed 2/2 pyloric ulcer, hemobilia s/p ERCP and MRCP, ESRD on iHD, pneumonia and influenza A who is seen for a video visit. Other history notable for HFpEF, NTM colonoization, hepatitis C, and methamphetamine use. She was admitted 3/6-3/10 for nausea, acute on chronic diarrhea and fever with CT scan demonstrating MARLON pneumonia and probably enteritis, discharged on Flagyl. She tested positive for covid last Tuesday. She is scheduled for a surveillance bronchoscopy on 4/19.      1. S/p bilateral lung transplant:  Persistent bibasilar pleural effusions:   Right hemidiaphragm palsy: slowly recovering from covid, cough is productive, but improved, dyspnea secondary to deconditioning/fatigue. Sating 96% on room air. S/p bronch on 1/25, no biopsies, BAL with no growth. Repeated overnight O2 study on room air and requires O2, also with multiple desaturation events. CMV 3/6 negative. No CXR or PFTs today. Scheduled for bronch on 4/19.   - On three drug IS including zathioprine 100 mg daily, tacrolimus (goal 8-12) and prednisone  - Dapsone qMWF for PJP ppx (Bactrim stopped d/t hyperkalemia; Pentamidine neb not tolerated)  - Wear 2L O2 at night, will set patient up with [facility], has not been done yet     2. 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  - DSA will be drawn with next labs     3. EBV viremia: last level of 3K (log 3.5) on 1/24.   - EBV will be drawn with next labs    4. Hypogammaglobulinemia: IgG adequate at time of transplant, repeat level low (336) on 7/28. S/p IVIG 7/30. Last IgG of 844 on 11/11/22.   - IgG with next labs     5. ESRD based upon cystatin C (GFR of 10): s/p tunneled line placement and HD initiated 9/26 on  T/Th/Sat schedule. Now with LUE fistula X 2 weeks, has phase two scheduled for 5/5. Notes she needs to move her appointment with Nephrology to discuss possible transplant as she was in the hospital with her last appointment.      6. Diarrhea: stable, likely exacerbated by TF.   - Continue fiber and imodium PRN     7. Severe gastroparesis:   Pyloric ulcer: now s/p EGD with botox by Dr. Navarro, initially could eat small amounts of food, but feels like she has regressed and no longer eating foods. Continues with TF overnight for 12 hours. Saw Dr. Navarro again in March with plan to wait for the one year remington before proceeding with G-POEM.   - PPI BID  - TC cycled feeds  - Consider AXR if still having symptoms next week     RTC: 6 weeks  Vaccinations: flu shot and bivalent covid vaccine  Annual dermatology visit: will see Derm in May  Preventative: DEXA > June 2023; colonoscopy completed March 2023 (diverticulosis in the sigmoid colon and colonic polyp noted s/p polypectomy)--needs repeat in 2026 per notes, mammogram completed this past winter and was negative     Kaylin Triana PA-C  Pulmonary, Allergy, Critical Care and Sleep Medicine    Interval history: since her covid diagnosis, patient is feeling okay. Had a fever of 101.3 and felt a little bit winded and fun down with a cough and runny nose. Feeling better now, no longer having a fever, cough got worse, then better, slightly productive. Still is fatigued. Minimal sinus or nasal congestion, no congestion, but does feel a little bit tight on the sides of her ribs. Felt nauseous X 1 day, but food isn't sounding good or taste good. No vomiting, has been having good emptying BMs.     Vitals: TT 97.8, /75, HR 75, , O2 sat of 95%  GENERAL: Healthy, alert and no distress  EYES: Eyes grossly normal to inspection.  No discharge or erythema, or obvious scleral/conjunctival abnormalities.  RESP: No audible wheeze, cough, or visible cyanosis.  No visible retractions or  increased work of breathing.    SKIN: Visible skin clear. No significant rash, abnormal pigmentation or lesions.  NEURO: Cranial nerves grossly intact.  Mentation and speech appropriate for age.  PSYCH: Mentation appears normal, affect normal/bright, judgement and insight intact, normal speech and appearance well-groomed.    Video-Visit Details    Type of service:  Video Visit   Video Start Time: 1:35  Video End Time: 1:55    Originating Location (pt. Location): Home    Distant Location (provider location):  On-site  Platform used for Video Visit: Jennifer

## 2023-04-04 ENCOUNTER — VIRTUAL VISIT (OUTPATIENT)
Dept: PULMONOLOGY | Facility: CLINIC | Age: 63
End: 2023-04-04
Attending: INTERNAL MEDICINE
Payer: MEDICARE

## 2023-04-04 DIAGNOSIS — Z94.2 S/P LUNG TRANSPLANT (H): Primary | ICD-10-CM

## 2023-04-04 DIAGNOSIS — Z79.899 ENCOUNTER FOR LONG-TERM (CURRENT) USE OF HIGH-RISK MEDICATION: ICD-10-CM

## 2023-04-04 DIAGNOSIS — D80.1 HYPOGAMMAGLOBULINEMIA (H): ICD-10-CM

## 2023-04-04 PROCEDURE — G0463 HOSPITAL OUTPT CLINIC VISIT: HCPCS | Mod: PN,GT | Performed by: PHYSICIAN ASSISTANT

## 2023-04-04 PROCEDURE — 99214 OFFICE O/P EST MOD 30 MIN: CPT | Mod: VID | Performed by: PHYSICIAN ASSISTANT

## 2023-04-04 NOTE — LETTER
4/4/2023         RE: Sofie Rodriguez  [address] [place] [address]        Dear Colleague,    Thank you for referring your patient, Sofie Rodriguez, to the [facility] FOR LUNG SCIENCE AND [facility]. Please see a copy of my visit note below.    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 diff colitis, gastroparesis s/p GJ tube placement now s/p EGD and botox, GI bleed 2/2 pyloric ulcer, hemobilia s/p ERCP and MRCP, ESRD on iHD, pneumonia and influenza A who is seen for a video visit. Other history notable for HFpEF, NTM colonoization, hepatitis C, and methamphetamine use. She was admitted 3/6-3/10 for nausea, acute on chronic diarrhea and fever with CT scan demonstrating MARLON pneumonia and probably enteritis, discharged on Flagyl. She tested positive for covid last Tuesday. She is scheduled for a surveillance bronchoscopy on 4/19.      1. S/p bilateral lung transplant:  Persistent bibasilar pleural effusions:   Right hemidiaphragm palsy: slowly recovering from covid, cough is productive, but improved, dyspnea secondary to deconditioning/fatigue. Sating 96% on room air. S/p bronch on 1/25, no biopsies, BAL with no growth. Repeated overnight O2 study on room air and requires O2, also with multiple desaturation events. CMV 3/6 negative. No CXR or PFTs today. Scheduled for bronch on 4/19.   - On three drug IS including zathioprine 100 mg daily, tacrolimus (goal 8-12) and prednisone  - Dapsone qMWF for PJP ppx (Bactrim stopped d/t hyperkalemia; Pentamidine neb not tolerated)  - Wear 2L O2 at night, will set patient up with [facility], has not been done yet     2. 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  - DSA will be drawn with next labs     3. EBV viremia: last level of 3K (log 3.5) on 1/24.   - EBV will  be drawn with next labs    4. Hypogammaglobulinemia: IgG adequate at time of transplant, repeat level low (336) on 7/28. S/p IVIG 7/30. Last IgG of 844 on 11/11/22.   - IgG with next labs     5. ESRD based upon cystatin C (GFR of 10): s/p tunneled line placement and HD initiated 9/26 on T/Th/Sat schedule. Now with LUE fistula X 2 weeks, has phase two scheduled for 5/5. Notes she needs to move her appointment with Nephrology to discuss possible transplant as she was in the hospital with her last appointment.      6. Diarrhea: stable, likely exacerbated by TF.   - Continue fiber and imodium PRN     7. Severe gastroparesis:   Pyloric ulcer: now s/p EGD with botox by Dr. Navarro, initially could eat small amounts of food, but feels like she has regressed and no longer eating foods. Continues with TF overnight for 12 hours. Saw Dr. Navarro again in March with plan to wait for the one year remington before proceeding with G-POEM.   - PPI BID  - TC cycled feeds  - Consider AXR if still having symptoms next week     RTC: 6 weeks  Vaccinations: flu shot and bivalent covid vaccine  Annual dermatology visit: will see Derm in May  Preventative: DEXA > June 2023; colonoscopy completed March 2023 (diverticulosis in the sigmoid colon and colonic polyp noted s/p polypectomy)--needs repeat in 2026 per notes, mammogram completed this past winter and was negative     Kaylin Triana PA-C  Pulmonary, Allergy, Critical Care and Sleep Medicine    Interval history: since her covid diagnosis, patient is feeling okay. Had a fever of 101.3 and felt a little bit winded and fun down with a cough and runny nose. Feeling better now, no longer having a fever, cough got worse, then better, slightly productive. Still is fatigued. Minimal sinus or nasal congestion, no congestion, but does feel a little bit tight on the sides of her ribs. Felt nauseous X 1 day, but food isn't sounding good or taste good. No vomiting, has been having good emptying BMs.      Vitals: TT 97.8, /75, HR 75, , O2 sat of 95%  GENERAL: Healthy, alert and no distress  EYES: Eyes grossly normal to inspection.  No discharge or erythema, or obvious scleral/conjunctival abnormalities.  RESP: No audible wheeze, cough, or visible cyanosis.  No visible retractions or increased work of breathing.    SKIN: Visible skin clear. No significant rash, abnormal pigmentation or lesions.  NEURO: Cranial nerves grossly intact.  Mentation and speech appropriate for age.  PSYCH: Mentation appears normal, affect normal/bright, judgement and insight intact, normal speech and appearance well-groomed.    Video-Visit Details    Type of service:  Video Visit   Video Start Time: 1:35  Video End Time: 1:55    Originating Location (pt. Location): Home    Distant Location (provider location):  On-site  Platform used for Video Visit: Statusly Visit Details    Type of service:  Video Visit   Video Start Time: 1:30 PM  Video End Time:1:55 PM    Originating Location (pt. Location): Home    Distant Location (provider location):  On-site  Platform used for Video Visit: [employer] (GELI)        Transplant Coordinator Note    Reason for visit: Post lung transplant follow up visit   Coordinator: Present   Caregiver:  None    Health concerns addressed today:  1. Resp:   2. GI: Pt has no appetite. Feeling like she is emptying all the way. Belly pain sometimes. Feels like eating got a little better after procedure with Dr. Navarro but now seems back to where she was. Will touch base with Dr. Navarro July 5th.   3. ENT:   4. Will go over second part of May for other half of fistula       Activity/rehab: Up ad magalie, pulm rehab twice per week  Oxygen needs: Only using oxygen at night, 2L  Pain management/RX: tylenol and oxycodone as needed, incisional pain from time to time.   Diabetic management: managed by PCP; checking twice per day  Next Bronch due: Scheduled for April 19th  Risk Criteria Labs: negative  7/28/22  CMV status: D+/R+  Valcyte stopped: POD 8-90  EBV status: D+/R+  AC/asa:  ASA discontinued after recent hospitalization   PJP prophylactic: Dapsone   Diet: Ad magalie. TF 12 hours/day.      COVID:  COVID-19 infection (yes/no, date of most recent positive test):    Status/instructions given about COVID-19 vaccine: Vaccinated, booster x2 last 3/21/22. Received Evusheld 8/24/2022. Received flu shot 10/12/22    Pt Education: medications (use/dose/side effects), how/when to call coordinator, frequency of labs, s/s of infection/rejection, call prior to starting any new medications, lab/vital sign book    Health Maintenance:   Last colonoscopy:   Next colonoscopy due:   Dermatology: Will have appointment in May   Vaccinations this visit:     Labs, CXR, PFTs reviewed with patient  Medication record reviewed and reconciled  Questions and concerns addressed    Patient Instructions  1. Continue to hydrate with 60-70 oz fluids daily.  2. Continue to exercise daily or most days of the week.  3. Follow up with your primary care provider for annual gender health maintenance procedures.  4. Follow up with colonoscopy schedule.  5. Follow up with annual dermatology visits.  6. It doesn't seem like the COVID vaccine is working well in lung transplant patients. A number of lung transplant patients have gotten sick with COVID even after receiving the vaccines. Based on our recent experience, it can be life-threatening to get COVID  even after being vaccinated. Please continue to act like you did not get the COVID vaccine - social distancing, wearing a mask, good hand hygiene, etc. If the people around you are vaccinated, it will help reduce the risk of you getting COVID. All members of your household should be vaccinated.  7. Girma will follow up with sleep referral.     Next transplant clinic appointment:  6 weeks with CXR, labs and PFTs   Next lab draw: two weeks before bronch    AVS printed at time of check out          Again,  thank you for allowing me to participate in the care of your patient.        Sincerely,        Kaylin Triana PA-C     26-Sep-2018 08:00:11

## 2023-04-04 NOTE — NURSING NOTE
Is the patient currently in the state of [place]? YES    Visit mode:TELEPHONE    If the visit is dropped, the patient can be reconnected by: VIDEO VISIT: Text to cell phone: [phone]    Will anyone else be joining the visit? NO      How would you like to obtain your AVS? MyChart    Are changes needed to the allergy or medication list? NO    Reason for visit: Follow up    Adele LAFLEUR

## 2023-04-04 NOTE — LETTER
2023    Patient Name: Sofie Rodriguez   :  1960   MRN: [id number]  ICD:       Please draw bmp, mag, cbc with platelets, tacrolimus level, ,INR, EBV DNA Quantitative whole blood, CMV Quantitative PCR (blood), and PRA donor specific Antibody on     Please fax the lab results as soon as they are available to:   [phone]    Thank you  for your continued support and the opportunity to collaborate in the care of this patient.  If you have any questions, please call [phone] or [phone].       Kaylin Triana PA-C

## 2023-04-04 NOTE — PROGRESS NOTES
Virtual Visit Details    Type of service:  Video Visit   Video Start Time: 1:30 PM  Video End Time:1:55 PM    Originating Location (pt. Location): Home    Distant Location (provider location):  On-site  Platform used for Video Visit: [employer] (Telehealth)        Transplant Coordinator Note    Reason for visit: Post lung transplant follow up visit   Coordinator: Present   Caregiver:  None    Health concerns addressed today:  1. Resp:   2. GI: Pt has no appetite. Feeling like she is emptying all the way. Belly pain sometimes. Feels like eating got a little better after procedure with Dr. Navarro but now seems back to where she was. Will touch base with Dr. Navarro July 5th.   3. ENT:   4. Will go over second part of May for other half of fistula       Activity/rehab: Up ad magalie, pulm rehab twice per week  Oxygen needs: Only using oxygen at night, 2L  Pain management/RX: tylenol and oxycodone as needed, incisional pain from time to time.   Diabetic management: managed by PCP; checking twice per day  Next Bronch due: Scheduled for April 19th  Risk Criteria Labs: negative 7/28/22  CMV status: D+/R+  Valcyte stopped: POD 8-90  EBV status: D+/R+  AC/asa:  ASA discontinued after recent hospitalization   PJP prophylactic: Dapsone   Diet: Ad magalie. TF 12 hours/day.      COVID:  1. COVID-19 infection (yes/no, date of most recent positive test):    2. Status/instructions given about COVID-19 vaccine: Vaccinated, booster x2 last 3/21/22. Received Evusheld 8/24/2022. Received flu shot 10/12/22    Pt Education: medications (use/dose/side effects), how/when to call coordinator, frequency of labs, s/s of infection/rejection, call prior to starting any new medications, lab/vital sign book    Health Maintenance:     Last colonoscopy:     Next colonoscopy due:     Dermatology: Will have appointment in May     Vaccinations this visit:     Labs, CXR, PFTs reviewed with patient  Medication record reviewed and reconciled  Questions and concerns  addressed    Patient Instructions  1. Continue to hydrate with 60-70 oz fluids daily.  2. Continue to exercise daily or most days of the week.  3. Follow up with your primary care provider for annual gender health maintenance procedures.  4. Follow up with colonoscopy schedule.  5. Follow up with annual dermatology visits.  6. It doesn't seem like the COVID vaccine is working well in lung transplant patients. A number of lung transplant patients have gotten sick with COVID even after receiving the vaccines. Based on our recent experience, it can be life-threatening to get COVID  even after being vaccinated. Please continue to act like you did not get the COVID vaccine - social distancing, wearing a mask, good hand hygiene, etc. If the people around you are vaccinated, it will help reduce the risk of you getting COVID. All members of your household should be vaccinated.  7. Girma will follow up with sleep referral.     Next transplant clinic appointment:  6 weeks with CXR, labs and PFTs   Next lab draw: two weeks before bronch    AVS printed at time of check out

## 2023-04-04 NOTE — PATIENT INSTRUCTIONS
Patient Instructions  1. Continue to hydrate with 60-70 oz fluids daily.  2. Continue to exercise daily or most days of the week.  3. Follow up with your primary care provider for annual gender health maintenance procedures.  4. Follow up with colonoscopy schedule.  5. Follow up with annual dermatology visits.  6. It doesn't seem like the COVID vaccine is working well in lung transplant patients. A number of lung transplant patients have gotten sick with COVID even after receiving the vaccines. Based on our recent experience, it can be life-threatening to get COVID  even after being vaccinated. Please continue to act like you did not get the COVID vaccine - social distancing, wearing a mask, good hand hygiene, etc. If the people around you are vaccinated, it will help reduce the risk of you getting COVID. All members of your household should be vaccinated.  7. Girma will follow up with sleep referral.     Next transplant clinic appointment:  6 weeks with CXR, labs and PFTs   Next lab draw: two weeks before bronch    AVS printed at time of check out      ~~~~~~~~~~~~~~~~~~~~~~~~~    Thoracic Transplant Office phone [phone], fax [phone]    Office Hours 8:30 - 5:00     For after-hours urgent issues, please dial [phone] and ask the  to page the Thoracic Transplant Coordinator On-Call.   --------------------  To expedite your medication refill(s), please contact your pharmacy and have them fax a refill request to: [phone]    *Please allow 3 business days for routine medication refills.  *Please allow 5 business days for controlled substance medication refills.    **For Diabetic medications and supplies refill(s), please contact your pharmacy and have them contact your Endocrine team.  --------------------  For scheduling appointments call [phone].  --------------------  Please Note: If you are active on [employer], all future test results will be sent by [employer] message only, and will no  longer be called to patient. You may also receive communication directly from your physician.

## 2023-04-04 NOTE — LETTER
2023    Patient Name: Sofie Rodriguez. Phone number [phone]   :  1960   MRN: [id number]    Order for sleep medicine referral/consult. Please call patient to schedule.       Please fax the lab results as soon as they are available to:   [phone]    Thank you  for your continued support and the opportunity to collaborate in the care of this patient.  If you have any questions, please call Girma [phone]        Kaylin Triana PA-C

## 2023-04-05 DIAGNOSIS — Z94.2 S/P LUNG TRANSPLANT (H): Primary | ICD-10-CM

## 2023-04-10 ENCOUNTER — DOCUMENTATION ONLY (OUTPATIENT)
Dept: TRANSPLANT | Facility: CLINIC | Age: 63
End: 2023-04-10
Payer: MEDICARE

## 2023-04-10 ASSESSMENT — ENCOUNTER SYMPTOMS: NEW SYMPTOMS OF CORONARY ARTERY DISEASE: 0

## 2023-04-12 ENCOUNTER — TELEPHONE (OUTPATIENT)
Dept: TRANSPLANT | Facility: CLINIC | Age: 63
End: 2023-04-12
Payer: MEDICARE

## 2023-04-12 NOTE — PROGRESS NOTES
Pt reports abdominal pain is better but not completely resolved. Overall feeling much better. Will fax order for abdominal x-ray.

## 2023-04-12 NOTE — TELEPHONE ENCOUNTER
Provider Call: General  Route to LPN    Reason for call:  Kaye from [facility] imaging would like a call back about questions she has about orders that were faxed over earlier today.    Call back needed? Yes    Return Call Needed  Same as documented in contacts section  When to return call?: Greater than one day: Route standard priority

## 2023-04-12 NOTE — LETTER
PHYSICIAN ORDERS      DATE & TIME ISSUED: 2023 11:09 AM  PATIENT NAME: Sofie Rodriguez   : 1960     [facility] MR# [if applicable]: [id number]     DIAGNOSIS:  Lung Transplant  Z94.2  Please call patient and schedule for abdominal x-ray week of 2023.      Any questions please call: Girma [phone]    Please fax these results to [phone].        Kaylin Triana PA-C

## 2023-04-17 ENCOUNTER — LAB (OUTPATIENT)
Dept: LAB | Facility: CLINIC | Age: 63
End: 2023-04-17
Payer: MEDICARE

## 2023-04-17 DIAGNOSIS — N18.6 ESRD (END STAGE RENAL DISEASE) (H): ICD-10-CM

## 2023-04-17 PROCEDURE — 80197 ASSAY OF TACROLIMUS: CPT | Performed by: PHYSICIAN ASSISTANT

## 2023-04-17 RX ORDER — ASCORBIC ACID, THIAMINE, RIBOFLAVIN, NIACINAMIDE, PYRIDOXINE, FOLIC ACID, COBALAMIN, BIOTIN, PANTOTHENIC ACID 100; 1.5; 1.7; 20; 10; 1; 6; 300; 1 MG/1; MG/1; MG/1; MG/1; MG/1; MG/1; UG/1; UG/1; MG/1
1 TABLET, COATED ORAL DAILY
Qty: 30 TABLET | Refills: 11 | Status: ON HOLD | OUTPATIENT
Start: 2023-04-17 | End: 2023-04-19

## 2023-04-18 ENCOUNTER — TELEPHONE (OUTPATIENT)
Dept: TRANSPLANT | Facility: CLINIC | Age: 63
End: 2023-04-18
Payer: MEDICARE

## 2023-04-18 ENCOUNTER — LAB (OUTPATIENT)
Dept: LAB | Facility: CLINIC | Age: 63
End: 2023-04-18
Payer: MEDICARE

## 2023-04-18 DIAGNOSIS — N18.5 CHRONIC KIDNEY DISEASE, STAGE 5 (H): ICD-10-CM

## 2023-04-18 DIAGNOSIS — Z94.2 S/P LUNG TRANSPLANT (H): ICD-10-CM

## 2023-04-18 DIAGNOSIS — N18.5 CHRONIC KIDNEY DISEASE, STAGE 5 (H): Primary | ICD-10-CM

## 2023-04-18 DIAGNOSIS — Z94.2 S/P LUNG TRANSPLANT (H): Primary | ICD-10-CM

## 2023-04-18 PROCEDURE — 86833 HLA CLASS II HIGH DEFIN QUAL: CPT

## 2023-04-18 PROCEDURE — 86832 HLA CLASS I HIGH DEFIN QUAL: CPT

## 2023-04-18 RX ORDER — LIDOCAINE 40 MG/G
CREAM TOPICAL
Status: CANCELLED | OUTPATIENT
Start: 2023-04-18

## 2023-04-18 RX ORDER — POLYETHYLENE GLYCOL 3350 17 G/17G
1 POWDER, FOR SOLUTION ORAL 2 TIMES DAILY
Qty: 4 PACKET | Refills: 0 | OUTPATIENT
Start: 2023-04-18 | End: 2024-08-08

## 2023-04-18 NOTE — TELEPHONE ENCOUNTER
Called patient to touch base about symptoms.     Feels diarrhea is all related to her TF formula. Only has issues at night when TF is on.   RD to touch base with patient.     Abdominal x-ray reviewed by Kaylin Triana. Recommending Miralax BID 1-2 days.   Patient very hesitant to adding Miralax. Can't imagine having more stool, was waking up every 15 minutes overnight with diarrhea.     Pt feeling better this morning with TF off but minimal appetite. Thought so food makes her queasy.     C-diff and enteric panel negative 3/7.     Pt has not started any new medications or supplements recently     Plan:   -review plan with Kera  -discuss miralax further after recommendations from SARAH

## 2023-04-18 NOTE — TELEPHONE ENCOUNTER
Called and spoke to pt.  She is going to have labs checked this morning and does not have a PRA/DSA  kit.  She is going to have the rest of the labs drawn and will follow up on repeat PRA once she has a  kit.    Plan to call pt later today to discuss pre bronch instructions.

## 2023-04-18 NOTE — PROGRESS NOTES
Bronchoscopy with lavage and biopsies   Date of transplant 6/28/2023   Transplant type bilateral lungs  Date of labs 4/18/2023  BUN 5.64 DDAVP No  Plt 235  INR 0.9 Anticoag therapy NA   Comment Please page Kaylin Triana with any questions or concerns [phone]

## 2023-04-18 NOTE — TELEPHONE ENCOUNTER
Reviewed pre bronch instructions.    You are scheduled for a bronchoscopy on April 19th at 8:00 AM at the [facility] on the 3rd floor. Arrive 1 hour early.     Instructions     1. Nothing to eat or drink 8 hours before procedure.  2. Hold your morning medications. Bring them with you to take after the procedure.  3. Have a  available to take you home.   4. Take metoprolol the AM of the procedure.    Pt verbalized understanding of plan and will call with any further questions or concerns.

## 2023-04-18 NOTE — TELEPHONE ENCOUNTER
Please call Sofie;  she has  scheduled [facility] tomorrow 04/19/2023  at 0800 questions about when she should come? And Medications she can or not take prior to appt.,

## 2023-04-18 NOTE — PROGRESS NOTES
Received message from txp coordinator that pt has had diarrhea, more since Easter. She reports eating a little bit on Easter, so was thinking this might be contributing to diarrhea, but it has persisted since and has only had a few bites of fruit over the past week (generally does not take much PO/PO only for pleasure). She did stop her cycled TF early one night and reports no more diarrhea. Diarrhea watery in nature, not oily that she notices. She also reports increase in nausea/feeling that she will vomit, which stopped when she discontinued feeds early one night.    Current regimen: Nepro 4 cartons/day (cycled over 12 hours)- 948 ml formula, 1680 calories, 76 g protein, 24 g fiber, 900 mg potassium, and 680 mg phosphorus.     Renal formula is still warranted. She remains on dialysis. Most recent potassium 2/28 4.5. Most recent phos 6.9.     Pt reports her weight has decreased d/t diarrhea. She has been giving Prosource via feeding tube.     Interventions:  - Change to new formula/regimen: [employer] Renal 1.8 4 cartons/day (cycled over 12 hours)- 1000 ml formula, 1800 calories, 80 g protein, 16 g fiber, 1000 mg potassium, and 760 mg phosphorus.  - Updated [facility] RD (Jannet). Pt will get new formula in 2 days. Informed patient.   - Can stop Prosource, as she is getting adequate protein for dialysis needs with TF regimen alone (est protein needs 77 g/day (1.2 g/kg based on 64 kg dosing wt-- dosing wt lower now, with weight loss)  - Will touch base with pt next week to assess tolerance.

## 2023-04-18 NOTE — TELEPHONE ENCOUNTER
Pt cancelled pre K eval virtual appts March 7th due to illness, has not yet rescheduled. Called pt today as well as send My Chart message asking when she would like to reschedule pre kidney tx eval.

## 2023-04-19 ENCOUNTER — HOSPITAL ENCOUNTER (OUTPATIENT)
Facility: CLINIC | Age: 63
Discharge: HOME | End: 2023-04-19
Attending: INTERNAL MEDICINE | Admitting: INTERNAL MEDICINE
Payer: MEDICARE

## 2023-04-19 VITALS
DIASTOLIC BLOOD PRESSURE: 77 MMHG | OXYGEN SATURATION: 98 % | RESPIRATION RATE: 16 BRPM | SYSTOLIC BLOOD PRESSURE: 133 MMHG | HEART RATE: 82 BPM

## 2023-04-19 DIAGNOSIS — Z94.2 S/P LUNG TRANSPLANT (H): ICD-10-CM

## 2023-04-19 LAB
APPEARANCE FLD: ABNORMAL
BRONCHOSCOPY: NORMAL
C PNEUM DNA SPEC QL NAA+PROBE: NOT DETECTED
CELL COUNT BODY FLUID SOURCE: ABNORMAL
COLOR FLD: COLORLESS
EOSINOPHIL NFR FLD MANUAL: 2 %
FLUAV H1 2009 PAND RNA SPEC QL NAA+PROBE: NOT DETECTED
FLUAV H1 RNA SPEC QL NAA+PROBE: NOT DETECTED
FLUAV H3 RNA SPEC QL NAA+PROBE: NOT DETECTED
FLUAV RNA SPEC QL NAA+PROBE: NOT DETECTED
FLUBV RNA SPEC QL NAA+PROBE: NOT DETECTED
GRAM STAIN RESULT: NORMAL
GRAM STAIN RESULT: NORMAL
HADV DNA SPEC QL NAA+PROBE: NOT DETECTED
HCOV PNL SPEC NAA+PROBE: NOT DETECTED
HMPV RNA SPEC QL NAA+PROBE: NOT DETECTED
HPIV1 RNA SPEC QL NAA+PROBE: NOT DETECTED
HPIV2 RNA SPEC QL NAA+PROBE: NOT DETECTED
HPIV3 RNA SPEC QL NAA+PROBE: NOT DETECTED
HPIV4 RNA SPEC QL NAA+PROBE: NOT DETECTED
LYMPHOCYTES NFR FLD MANUAL: 4 %
M PNEUMO DNA SPEC QL NAA+PROBE: NOT DETECTED
MONOS+MACROS NFR FLD MANUAL: NORMAL %
NEUTS BAND NFR FLD MANUAL: 14 %
OTHER CELLS FLD MANUAL: 80 %
PATH REPORT.COMMENTS IMP SPEC: ABNORMAL
PATH REPORT.COMMENTS IMP SPEC: YES
PATH REPORT.FINAL DX SPEC: ABNORMAL
PATH REPORT.GROSS SPEC: ABNORMAL
PATH REPORT.RELEVANT HX SPEC: ABNORMAL
RSV RNA SPEC QL NAA+PROBE: NOT DETECTED
RSV RNA SPEC QL NAA+PROBE: NOT DETECTED
RV+EV RNA SPEC QL NAA+PROBE: NOT DETECTED
TACROLIMUS BLD-MCNC: 9.3 UG/L (ref 5–15)
TME LAST DOSE: NORMAL H
TME LAST DOSE: NORMAL H
WBC # FLD AUTO: 227 /UL

## 2023-04-19 PROCEDURE — 250N000009 HC RX 250: Performed by: INTERNAL MEDICINE

## 2023-04-19 PROCEDURE — 31624 DX BRONCHOSCOPE/LAVAGE: CPT | Performed by: INTERNAL MEDICINE

## 2023-04-19 PROCEDURE — 87486 CHLMYD PNEUM DNA AMP PROBE: CPT | Performed by: INTERNAL MEDICINE

## 2023-04-19 PROCEDURE — 87633 RESP VIRUS 12-25 TARGETS: CPT | Performed by: INTERNAL MEDICINE

## 2023-04-19 PROCEDURE — 99152 MOD SED SAME PHYS/QHP 5/>YRS: CPT | Performed by: INTERNAL MEDICINE

## 2023-04-19 PROCEDURE — 87102 FUNGUS ISOLATION CULTURE: CPT | Performed by: INTERNAL MEDICINE

## 2023-04-19 PROCEDURE — 88312 SPECIAL STAINS GROUP 1: CPT | Mod: TC | Performed by: INTERNAL MEDICINE

## 2023-04-19 PROCEDURE — 87206 SMEAR FLUORESCENT/ACID STAI: CPT | Performed by: INTERNAL MEDICINE

## 2023-04-19 PROCEDURE — 87205 SMEAR GRAM STAIN: CPT | Performed by: INTERNAL MEDICINE

## 2023-04-19 PROCEDURE — 89051 BODY FLUID CELL COUNT: CPT | Performed by: INTERNAL MEDICINE

## 2023-04-19 PROCEDURE — 88108 CYTOPATH CONCENTRATE TECH: CPT | Mod: 26 | Performed by: PATHOLOGY

## 2023-04-19 PROCEDURE — 250N000011 HC RX IP 250 OP 636: Performed by: INTERNAL MEDICINE

## 2023-04-19 PROCEDURE — G0500 MOD SEDAT ENDO SERVICE >5YRS: HCPCS | Performed by: INTERNAL MEDICINE

## 2023-04-19 PROCEDURE — 88312 SPECIAL STAINS GROUP 1: CPT | Mod: 26 | Performed by: PATHOLOGY

## 2023-04-19 PROCEDURE — 87070 CULTURE OTHR SPECIMN AEROBIC: CPT | Performed by: INTERNAL MEDICINE

## 2023-04-19 RX ORDER — LIDOCAINE 40 MG/G
CREAM TOPICAL
Status: DISCONTINUED | OUTPATIENT
Start: 2023-04-19 | End: 2023-04-19 | Stop reason: HOSPADM

## 2023-04-19 RX ORDER — FENTANYL CITRATE 50 UG/ML
INJECTION, SOLUTION INTRAMUSCULAR; INTRAVENOUS PRN
Status: DISCONTINUED | OUTPATIENT
Start: 2023-04-19 | End: 2023-04-19 | Stop reason: HOSPADM

## 2023-04-19 RX ORDER — ONDANSETRON 2 MG/ML
INJECTION INTRAMUSCULAR; INTRAVENOUS PRN
Status: DISCONTINUED | OUTPATIENT
Start: 2023-04-19 | End: 2023-04-19 | Stop reason: HOSPADM

## 2023-04-19 RX ORDER — LIDOCAINE HYDROCHLORIDE 10 MG/ML
INJECTION, SOLUTION INFILTRATION; PERINEURAL PRN
Status: DISCONTINUED | OUTPATIENT
Start: 2023-04-19 | End: 2023-04-19 | Stop reason: HOSPADM

## 2023-04-19 RX ORDER — LIDOCAINE HYDROCHLORIDE 40 MG/ML
INJECTION, SOLUTION RETROBULBAR PRN
Status: DISCONTINUED | OUTPATIENT
Start: 2023-04-19 | End: 2023-04-19 | Stop reason: HOSPADM

## 2023-04-19 ASSESSMENT — ACTIVITIES OF DAILY LIVING (ADL)
ADLS_ACUITY_SCORE: 35
ADLS_ACUITY_SCORE: 35

## 2023-04-19 NOTE — DISCHARGE INSTRUCTIONS
[facility], [place]  Same-Day BRONCHOSCOPY Procedure (with or without biopsy and/or intervention)  Adult Discharge Orders & Instructions     You had a procedure known as an Bronchoscopy (Bronch). Your healthcare provider performed the Bronch to look directly into the airways of your trachea and/or lungs. This is done using a lighted camera called a bronchoscope. The bronchoscope allows your provider to see inside the tissue of the airways. Often biopsies, small samples of tissue, are taken to help diagnose and/or classify stages of disease growth. This procedure is used to diagnose diseases of the lungs and/or surrounding tissues.     After your procedure   Make sure to clarify with your healthcare provider any diet restrictions (For example, clear liquid, low fat, no caffeine, etc.)   Do NOT take aspirin containing medications or any other blood-thinning medicines (anticoagulants) until your healthcare provider says it's OK.   You MAY be prescribed antibiotics, depending on what was done and/or found during your EUS, make sure to take antibiotics as prescribed by your healthcare provider    For 24 hours after BRONCHOSCOPY     You may cough up a small amount of blood for a day or two  Get plenty of rest.  A responsible adult must stay with you for at least 24 hours after you leave the hospital.   Do not drive or use heavy equipment.  If you have weakness or tingling, don't drive or use heavy equipment until this feeling goes away.  Do not drink alcohol.  Avoid strenuous or risky activities (gym, yoga, cycling, etc.).  Ask for help when climbing stairs.   You may feel lightheaded.  IF so, sit for a few minutes before standing.  Have someone help you get up.   If you have nausea (feel sick to your stomach): Drink only clear liquids such as apple juice, ginger ale, broth or 7-Up.  Rest may also help.  Be sure to drink enough fluids.  Move to a regular diet as you feel able.  You may have a  slight fever. Call the doctor if your fever is over 100 F (37.7 C) (taken under the tongue) or lasts longer than 24 hours.  You may have a dry mouth, a sore throat, muscle aches or trouble sleeping.  These are normal and should go away after 24 hours.  Do not make important or legal decisions.     Call your doctor  for any of the following:    If you begin to cough up bright red blood, or large clots of blood   If you become increasing more short of breath or begin to have chest pains  Difficulty swallowing or feeling as though food or liquids are getting stuck   Sore throat lasting more than 2 days or pain that has worsened over time  Nausea and/or vomiting that is not resolving or has not responded to anti-nausea medications if prescribed to you   If you experience a fever above 100.5 F (38 C) for more than 24 hours.   It has been over 8 to 10 hours since surgery and you are still not able to urinate (pass water).      To contact a doctor, call:  [ ] ________________________ (Monday thru Friday 8:00am to 4:30pm)  [ ] [phone] and ask for the PULMONARY MEDICINE resident on call (answered 24 hours a day)  [ ] Emergency Department: [facility]: [phone]    Take it easy when you get home.  Remember, same day surgery DOES NOT MEAN SAME DAY RECOVERY!  Healing is a gradual process.  You will need some time to recover - you may be more tired than you realize at first.  Rest and relax for at least the first 24 hours at home.  You'll feel better and heal faster if you take good care of yourself.

## 2023-04-19 NOTE — RESULT ENCOUNTER NOTE
Tacrolimus level 9.3 at 12 hours, on 4/17/23.  Goal 8-12.   Current dose 3 mg in AM, 2.5 mg in PM    Level at goal.  No dose change.    [employer] message sent

## 2023-04-19 NOTE — OR NURSING
Pt had bronchoscopy under moderate sedation, lavage. Pt tolerated procedure very well. Pt stable at time of transfer to [address] [place] [address], on 2 lpm NC. Procedural report to Charity RITTER.

## 2023-04-20 ENCOUNTER — TELEPHONE (OUTPATIENT)
Dept: TRANSPLANT | Facility: CLINIC | Age: 63
End: 2023-04-20
Payer: MEDICARE

## 2023-04-20 DIAGNOSIS — Z94.2 S/P LUNG TRANSPLANT (H): ICD-10-CM

## 2023-04-20 LAB
CMV DNA SPEC NAA+PROBE-ACNC: <137 IU/ML
CMV DNA SPEC NAA+PROBE-LOG#: <2.1 {LOG_COPIES}/ML
DONOR IDENTIFICATION: NORMAL
DQB2: NORMAL
DSA COMMENTS: NORMAL
DSA PRESENT: YES
DSA TEST METHOD: NORMAL
ORGAN: NORMAL
SA 1 CELL: NORMAL
SA 1 TEST METHOD: NORMAL
SA 2 CELL: NORMAL
SA 2 TEST METHOD: NORMAL
SA1 HI RISK ABY: NORMAL
SA1 MOD RISK ABY: NORMAL
SA2 HI RISK ABY: NORMAL
SA2 MOD RISK ABY: NORMAL
UNACCEPTABLE ANTIGENS: NORMAL
UNOS CPRA: 37
ZZZSA 1  COMMENTS: NORMAL
ZZZSA 2 COMMENTS: NORMAL

## 2023-04-20 NOTE — TELEPHONE ENCOUNTER
Patient notifies us that she is currently taking tacrolimus 3mg BID and her level is at goal at 9.3.  Instructed patient to continue with current dosing and will check again with routine labs.

## 2023-04-21 LAB — BACTERIA BRONCH: NORMAL

## 2023-04-25 DIAGNOSIS — Z94.2 S/P LUNG TRANSPLANT (H): Primary | ICD-10-CM

## 2023-04-25 LAB
BRONCHOSCOPY: NORMAL
Lab: NORMAL
PERFORMING LABORATORY: NORMAL
SCANNED LAB RESULT: NORMAL
TEST NAME: NORMAL

## 2023-04-25 RX ORDER — POLYETHYLENE GLYCOL 3350 17 G/17G
17 POWDER, FOR SOLUTION ORAL 2 TIMES DAILY
Qty: 68 G | Refills: 0 | Status: SHIPPED | OUTPATIENT
Start: 2023-04-25 | End: 2023-04-27

## 2023-04-25 NOTE — PROGRESS NOTES
Based off recent abdominal x-ray, Kaylin Triana recommending Miralax BID 1-2 days last week. Pt wanting to wait until she tried new tube feed formula. Has tried new tube feeding formula and having same issues of feeling nauseous and diarrhea when starting formula.

## 2023-04-26 ENCOUNTER — MEDICAL CORRESPONDENCE (OUTPATIENT)
Dept: HEALTH INFORMATION MANAGEMENT | Facility: CLINIC | Age: 63
End: 2023-04-26
Payer: MEDICARE

## 2023-04-27 ENCOUNTER — DOCUMENTATION ONLY (OUTPATIENT)
Dept: TRANSPLANT | Facility: CLINIC | Age: 63
End: 2023-04-27
Payer: MEDICARE

## 2023-04-27 NOTE — PROGRESS NOTES
Checked in with Sofie after we switched her TF formula to [employer] Renal 1.8 last week to see if nausea & diarrhea improved. Pt reports symptoms remain, unfortunately. Her txp team is recommending some Miralax. I messaged Sofie about possibly adding an oral fiber powder daily into her water and taking orally (Metamucil, Benefiber) to see if this helps provide some consistency to her stools.

## 2023-05-02 ENCOUNTER — TELEPHONE (OUTPATIENT)
Dept: TRANSPLANT | Facility: CLINIC | Age: 63
End: 2023-05-02
Payer: MEDICARE

## 2023-05-02 DIAGNOSIS — Z94.2 S/P LUNG TRANSPLANT (H): Chronic | ICD-10-CM

## 2023-05-02 NOTE — PROGRESS NOTES
Pt continues to struggle with diarrhea despite changing tube feed formula and using miralax for stool burden. Nausea starts when she hooks up her tube feeds and diarrhea worsens with feeds going. Last stool studies done 3/7. Has lost 20lbs since Easter.     Per Kaylin Triana:  repeat stool studies including enteric panel and C diff (last I see are from 3/7?), check fecal elastase, EBV, CMV, TSH and get AXR.     Decrease her azathioprine to 50 mg daily for now. Do not increase imodium unless work up negative.     If all negative, I might consider CT abdomen/pelvis and let's try to move up her GI appointment.     Lab orders faxed to [facility].   Message sent to Dr. Navarro's nurse to move appointment.     Patient aware of plan     Addendum 5/3/2023:   Dr. Navarro requesting Let's get a c.diff, stool enteric pathogens, stool Ova and parasites, giardia Ag, and a fecal calprotectin    Additional fax sent for add-on stool studies.

## 2023-05-02 NOTE — LETTER
PHYSICIAN ORDERS      DATE & TIME ISSUED: May 3, 2023 9:59 AM  PATIENT NAME: Sofie Rodriguez   : 1960     [facility] MR# [if applicable]: [id number]     DIAGNOSIS:  Lung Transplant  Z94.2 Diarrhea R19.7    Lesly collect Ova and Parasites by stool, Giardia lamblia AG stool, enteric bacteria and viral panel by stool, c-diff by stool, and fecal elastase by stool week of 2023       Please draw EBV DNA Quantitative whole blood, bmp, mag, cbc with platelets, tacrolimus level and PRA donor specific antibody ( kit) week of 2023        Any questions please call: Girma [phone]    Please fax these results to [phone].        Kaylin Triana PA-C

## 2023-05-02 NOTE — LETTER
PHYSICIAN ORDERS      DATE & TIME ISSUED: May 2, 2023 4:01 PM  PATIENT NAME: Sofie Rodriguez   : 1960     [facility] MR# [if applicable]: [id number]     DIAGNOSIS:  Diarrhea R19.7    Please call patient and schedule 2 view abdominal x-ray week of 2023    Any questions please call: Girma [phone]    Please fax these results to [phone].        aKylin Triana PA-C

## 2023-05-02 NOTE — TELEPHONE ENCOUNTER
Called Sofie today to review nutrition and ongoing nausea & diarrhea.   We switched her TF formula ~2 weeks ago (current regimen [employer] Renal 1.8 4 cartons/day (cycled over 12 hours)- 1000 ml formula, 1800 calories, 80 g protein, 16 g fiber, 1000 mg potassium, and 760 mg phosphorus).     She reports ongoing nausea and diarrhea, which she is correlating with TF. She reports not having run her feeds all week except yesterday. She has had baseline diarrhea since txp, yet diarrhea is baseline w/o TF running. However, without TF running, she is unable to take more PO during the day and has  lost ~20 lbs in the past 2 months.     PO intake: boiled egg one day, 1/4 ham s/w another day, few bites ramen another day  Drinks water, very seldom small amt of milk  Very rare sugar free candy     Nutrisource Fiber pkts TID- doing for many months- provides additional 9 g fiber/day + 16 g fiber in formula = 24 g fiber/day (within recommended range for an adult)    Interventions:  - Recommend testing pancreatic fecal elastase. Does not look like this has been done. Do not suspect LILIA, but hoping to rule out.   - Called dialysis RD and her K has been wnl, Phos levels borderline ~5's. Is on Tums 1x during TF cycle to bind some phosphorus there, but likely not full amount of phosphorus received.   - Emailed I [employer]. Pt would like to receive more protein packets, as she reports taking these more regularly while she has not been running TF.   - Talked with coordinator, Girma. Pt may benefit from GI follow up (not scheduled until July).   - Unsure if switching formulas would provide relief. She has been on a handful of different formulas in the past with similar sx. Could consider a [employer] option down the road.

## 2023-05-02 NOTE — LETTER
PHYSICIAN ORDERS      DATE & TIME ISSUED: May 3, 2023 10:02 AM  PATIENT NAME: Sofie Rodriguez   : 1960     [facility] MR# [if applicable]: [id number]     DIAGNOSIS:  Lung Transplant  Z94.2    Please call patient and schedule 2 view abdominal x-ray week of 2023    Any questions please call: Girma [phone]    Please fax these results to [phone].        Kaylin Triana PA-C

## 2023-05-02 NOTE — LETTER
PHYSICIAN ORDERS      DATE & TIME ISSUED: May 3, 2023 9:51 AM  PATIENT NAME: Sofie Rodriguez   : 1960     [facility] MR# [if applicable]: [id number]     DIAGNOSIS:  Lung Transplant  Z94.2 Diarrhea R19.7  Please collect Ova and Parasites by stool and Giardia lamblia AG stool by stool week of 5/3/2023 in addition to other stool study orders faxed on 2023      Any questions please call: Girma [phone]    Please fax these results to [phone].        Kaylin Triana PA-C

## 2023-05-02 NOTE — LETTER
PHYSICIAN ORDERS      DATE & TIME ISSUED: May 2, 2023 3:57 PM  PATIENT NAME: Sofie Rodriguez   : 1960     [facility] MR# [if applicable]: [id number]     DIAGNOSIS:  Lung Transplant  Z94.2    Please draw EBV DNA Quantitative whole blood, bmp, mag, cbc with platelets, tacrolimus level and PRA donor specific antibody ( kit) week of 2023    Please collect enteric bacteria and viral panel by stool, c-diff by stool, and fecal elastase by stool week of 2023    Any questions please call: Girma [phone]    Please fax these results to [phone].        Kaylin Triana PA-C

## 2023-05-03 ENCOUNTER — DOCUMENTATION ONLY (OUTPATIENT)
Dept: GASTROENTEROLOGY | Facility: CLINIC | Age: 63
End: 2023-05-03
Payer: MEDICARE

## 2023-05-03 ENCOUNTER — PATIENT OUTREACH (OUTPATIENT)
Dept: GASTROENTEROLOGY | Facility: CLINIC | Age: 63
End: 2023-05-03
Payer: MEDICARE

## 2023-05-03 NOTE — TELEPHONE ENCOUNTER
"Pt thinks the tube feeds are \"not agreeing with her\". Has tried changing the formula without change in symptoms. Right now is not taking the tube feeding consistently, an hour after starting the feeds she gets diarrhea and nausea. Has held it since Monday. Is taking a fiber supplement TID already.   Is not taking enough by mouth. Can not do a lot of anything. Can only drink one can of ensure over the course of a day. Has lost 20 lbs over 3-4 weeks.    Pt's transplant coordinator is arranging stool tests that Dr Navarro requested. Also an abdominal xray. Asked pt to submit the stool test asap so that they can be resulted in time for virtual visit next week 5/10 with Dr Navarro.   Message routed to clinic coordinators to schedule.    Penny Vogel, RN, BSN,   [facility]  Care coordinator      "

## 2023-05-03 NOTE — PROGRESS NOTES
Called PT and confirmed Appt.    Called to remind patient of their upcoming appointment with our GI clinic, on 05/10/23 at 11:40 AM with Dr. Gonzalez Navarro. This appointment is scheduled as a video visit. You will receive a call approximately 30 minutes prior to check you in, you must be in [place] for this visit., if your appointment is virtual (video or telephone) you need to be in [place] for the visit. To reschedule or cancel patient to call [phone].      SK

## 2023-05-05 ENCOUNTER — LAB (OUTPATIENT)
Dept: LAB | Facility: CLINIC | Age: 63
End: 2023-05-05
Payer: MEDICARE

## 2023-05-05 ENCOUNTER — APPOINTMENT (OUTPATIENT)
Dept: LAB | Facility: CLINIC | Age: 63
End: 2023-05-05
Payer: MEDICARE

## 2023-05-05 DIAGNOSIS — Z94.2 LUNG REPLACED BY TRANSPLANT (H): ICD-10-CM

## 2023-05-05 PROCEDURE — 80197 ASSAY OF TACROLIMUS: CPT

## 2023-05-06 ENCOUNTER — HEALTH MAINTENANCE LETTER (OUTPATIENT)
Age: 63
End: 2023-05-06

## 2023-05-06 LAB
TACROLIMUS BLD-MCNC: 18.8 UG/L (ref 5–15)
TME LAST DOSE: ABNORMAL H
TME LAST DOSE: ABNORMAL H

## 2023-05-08 ENCOUNTER — TELEPHONE (OUTPATIENT)
Dept: TRANSPLANT | Facility: CLINIC | Age: 63
End: 2023-05-08
Payer: MEDICARE

## 2023-05-08 DIAGNOSIS — Z94.2 S/P LUNG TRANSPLANT (H): ICD-10-CM

## 2023-05-08 NOTE — PROGRESS NOTES
PCP please advise    Transplant Social Work Services Progress Note      Date of Initial Social Work Evaluation: 4/8/2021  Collaborated with: n/a    Data:voicemail received from daughter Julia with medical and nursing questions.  I asked Ann-Marie Transplant coordinator to call her back.  She did so.   Intervention: communicated with pulm team.   Education provided by SW: n/a  Plan:    Discharge Plans in Progress: none at this time    Barriers to d/c plan: critical illness    Follow up Plan: will continue to follow for support, counseling, education and resources.      Mana Valdivia [facility]  Lung Transplant   Phone: [phone] Pager: [phone]

## 2023-05-08 NOTE — LETTER
PHYSICIAN ORDERS      DATE & TIME ISSUED: May 8, 2023 10:31 AM  PATIENT NAME: Sofie Rodriguez   : 1960     [facility] MR# [if applicable]: [id number]     DIAGNOSIS:  Lung Transplant  Z94.2 Diarrhea R19.7  Please collect enteric bacteria and viral panel by stool, c-diff by stool, and fecal elastase by stool week of 2023      Any questions please call: Girma [phone]    Please fax these results to [phone].        Kaylin Triana PA-C

## 2023-05-08 NOTE — TELEPHONE ENCOUNTER
Last had tube feeds on May 1st, for 2.5 hours overnight.   Eating little bit by mouth, usually once a day.     Tacrolimus level 18.8 at 12 hours, on 5/5/23.  Goal 8-12.   Current dose 3 mls in AM, 3 mls in PM    Dose changed to 2.9 mg in AM, 2.8 mg in PM   Recheck level end of this week    Discussed with patient     Called [facility] lab. Only ordered O&P and giardia stool studies. Will re-fax orders for enteric bacteria and viral panel by stool, c-diff by stool, and fecal elastase by stool.      WBC 2.4.   Per Kaylin Triana: hold Imruan

## 2023-05-08 NOTE — LETTER
PHYSICIAN ORDERS      DATE & TIME ISSUED: May 8, 2023 10:33 AM  PATIENT NAME: Sofie Rodriguez   : 1960     [facility] MR# [if applicable]: [id number]     DIAGNOSIS:  Lung Transplant  Z94.2    Please draw tacrolimus level, bmp, mag, cbc with platelets, EBV DNA Quantitative whole blood and CMV Quantitative PCR (blood) week of 2023    Any questions please call: Girma [phone]    Please fax these results to [phone].        Kaylin Triana PA-C

## 2023-05-09 ENCOUNTER — PATIENT OUTREACH (OUTPATIENT)
Dept: GASTROENTEROLOGY | Facility: CLINIC | Age: 63
End: 2023-05-09
Payer: MEDICARE

## 2023-05-09 ENCOUNTER — TELEPHONE (OUTPATIENT)
Dept: TRANSPLANT | Facility: CLINIC | Age: 63
End: 2023-05-09
Payer: MEDICARE

## 2023-05-09 DIAGNOSIS — A04.72 C. DIFFICILE DIARRHEA: ICD-10-CM

## 2023-05-09 DIAGNOSIS — Z94.2 S/P LUNG TRANSPLANT (H): Primary | ICD-10-CM

## 2023-05-09 RX ORDER — VANCOMYCIN HYDROCHLORIDE 50 MG/ML
KIT ORAL
Qty: 450 ML | Refills: 0 | Status: SHIPPED | OUTPATIENT
Start: 2023-05-09 | End: 2023-06-07

## 2023-05-09 RX ORDER — VANCOMYCIN HYDROCHLORIDE 50 MG/ML
KIT ORAL
Qty: 170 ML | Refills: 0 | Status: SHIPPED | OUTPATIENT
Start: 2023-05-09 | End: 2023-05-09

## 2023-05-09 NOTE — TELEPHONE ENCOUNTER
Patients stool positive for c-diff     Per Kaylin Triana:   Vancomycin taper as follows:  Vancomycin 125mg four times daily x10 days   Vancomycin 125mg two times daily x7 days   Vancomycin 125mg daily x7 days   Vancomycin 125mg every other day x2 weeks (14 days)

## 2023-05-09 NOTE — TELEPHONE ENCOUNTER
Called pt with update that she had positive C diff result. Per transplant team, starting vanco. Dr Navarro would like to cancel plans for virtual visit tomorrow and see if vanco improves her symptoms. Message routed to transplant team that pt needs further education on vanco treatment and cdiff spread prevention.    Penny Vogel RN, BSN,   [facility]  Care coordinator

## 2023-05-12 ENCOUNTER — EXTERNAL ORDER RESULTS (OUTPATIENT)
Dept: LAB | Facility: CLINIC | Age: 63
End: 2023-05-12

## 2023-05-12 ENCOUNTER — APPOINTMENT (OUTPATIENT)
Dept: LAB | Facility: CLINIC | Age: 63
End: 2023-05-12
Payer: MEDICARE

## 2023-05-12 DIAGNOSIS — Z94.2 S/P LUNG TRANSPLANT (H): ICD-10-CM

## 2023-05-12 DIAGNOSIS — D70.9 NEUTROPENIA (H): Primary | ICD-10-CM

## 2023-05-12 DIAGNOSIS — Z94.2 LUNG REPLACED BY TRANSPLANT (H): ICD-10-CM

## 2023-05-12 DIAGNOSIS — N18.6 ESRD (END STAGE RENAL DISEASE) (H): ICD-10-CM

## 2023-05-12 DIAGNOSIS — D80.1 HYPOGAMMAGLOBULINEMIA (H): ICD-10-CM

## 2023-05-12 PROCEDURE — 80197 ASSAY OF TACROLIMUS: CPT | Performed by: INTERNAL MEDICINE

## 2023-05-13 LAB
TACROLIMUS BLD-MCNC: 12.7 UG/L (ref 5–15)
TME LAST DOSE: NORMAL H
TME LAST DOSE: NORMAL H

## 2023-05-15 ENCOUNTER — TELEPHONE (OUTPATIENT)
Dept: TRANSPLANT | Facility: CLINIC | Age: 63
End: 2023-05-15
Payer: MEDICARE

## 2023-05-15 ENCOUNTER — TELEPHONE (OUTPATIENT)
Dept: PULMONOLOGY | Facility: CLINIC | Age: 63
End: 2023-05-15
Payer: MEDICARE

## 2023-05-15 DIAGNOSIS — Z94.2 S/P LUNG TRANSPLANT (H): Primary | ICD-10-CM

## 2023-05-15 DIAGNOSIS — D70.9 NEUTROPENIA (H): ICD-10-CM

## 2023-05-15 LAB
% BANDS (EXTERNAL): 7.2 %
% EOSINOPHILS (EXTERNAL): 4.1 % (ref 0–7)
% LYMPHOCYTES (EXTERNAL): 24.7 % (ref 16–49)
% MONOCYTES (EXTERNAL): 15.5 % (ref 0–10)
% NEUTROPHILS (EXTERNAL): 47.4 % (ref 43–80)
ABSOLUTE BANDS (EXTERNAL): 0.1 10(3)/UL
ABSOLUTE EOSINOPHILS (EXTERNAL): 0.1 10(3)/UL (ref 0–0.8)
ABSOLUTE LYMPHOCYTES (EXTERNAL): 0.4 10(3)/UL (ref 1.2–3.9)
ABSOLUTE MONOCYTES (EXTERNAL): 0.3 10(3)/UL (ref 0–1.1)
ABSOLUTE NEUTROPHILS (EXTERNAL): 0.9 10(3)/UL (ref 1.8–9.2)
ANION GAP SERPL CALC-SCNC: 17 MMOL/L (ref 10–20)
BUN SERPL-MCNC: 11.5 MG/DL (ref 10–20)
BUN/CREATININE RATIO (EXTERNAL): 2.6 RATIO (ref 11.7–22.9)
CALCIUM (EXTERNAL): 9.7 MG/DL (ref 8.6–10.5)
CHLORIDE (EXTERNAL): 98 MMOL/L (ref 98–107)
CMV DNA QUANT (EXTERNAL): NORMAL IU/ML
CO2 (EXTERNAL): 26 MMOL/L (ref 22–29)
CREATININE (EXTERNAL): 4.43 MG/DL (ref 0.57–1.11)
ERYTHROCYTE [DISTWIDTH] IN BLOOD BY AUTOMATED COUNT: 18.7 % (ref 10–16.8)
GFR ESTIMATED (EXTERNAL): 11 ML/MIN/1.73M2
GLUCOSE (EXTERNAL): 82 MG/DL (ref 70–100)
HEMATOCRIT (EXTERNAL): 35.2 % (ref 33.9–47.5)
HEMOGLOBIN (EXTERNAL): 11.7 G/DL (ref 11.2–15.8)
INR (EXTERNAL) - DO NOT USE: 0.9 INR (ref 0.9–1.1)
LOG IU/ML OF CMVQNT (EXTERNAL): NORMAL LOG IU/ML
Lab: 0 10(3)/UL
Lab: 1 %
MAGNESIUM (EXTERNAL): 1.9 MG/DL (ref 1.8–2.6)
MCH RBC QN AUTO: 38.5 PG (ref 26.4–32.9)
MCHC RBC AUTO-ENTMCNC: 33.4 G/DL (ref 32–36)
MCV RBC AUTO: 115.2 FL (ref 80.6–98.5)
METHOD (EXTERNAL): ABNORMAL
PLATELET COUNT (EXTERNAL): 202 10(3)/UL (ref 179–450)
POTASSIUM (EXTERNAL): 4 MMOL/L (ref 3.5–5.1)
PT - PROTHROMBINE TIME (EXTERNAL): 10.4 SEC (ref 9.4–12.5)
RBC # BLD AUTO: 3.05 10(6)/UL (ref 3.76–5.39)
SODIUM (EXTERNAL): 137 MMOL/L (ref 136–146)
WBC COUNT (EXTERNAL): 1.8 10(3)/UL (ref 3.7–12.1)

## 2023-05-15 RX ORDER — FILGRASTIM 300 UG/ML
300 INJECTION, SOLUTION INTRAVENOUS; SUBCUTANEOUS DAILY
Qty: 1 ML | Refills: 0
Start: 2023-05-15 | End: 2023-05-15

## 2023-05-15 RX ORDER — FILGRASTIM-SNDZ 300 UG/.5ML
300 INJECTION, SOLUTION INTRAVENOUS; SUBCUTANEOUS DAILY
Qty: 0.5 ML | Refills: 0 | Status: SHIPPED | OUTPATIENT
Start: 2023-05-15 | End: 2023-05-23

## 2023-05-15 NOTE — TELEPHONE ENCOUNTER
PA Initiation    Medication: Zarxio 300MCG/0.5ML syringes- pa pending  Insurance Company: [employer] - Phone [phone] Fax [phone]  Pharmacy Filling the Rx: [facility] - [place], [place] - [facility] [address]  Filling Pharmacy Phone: [phone]  Filling Pharmacy Fax: [phone]  Start Date: 5/15/2023

## 2023-05-15 NOTE — TELEPHONE ENCOUNTER
WBC 1.8   ANC 0.9     Imuran held x1 week.     Per Kaylin Triana:  -Zarxio x1   -parvovirus IgM and PCR     Patient has been on vancomycin for c-diff a little under a week now. Reports she is still unable to tolerate her tube feedings, will turn them on for 30 minutes at night but shuts them off due to nausea. Pt reports her weight today is 117lbs, was 140lbs around [facility]. Eats very little by mouth due to severe gastroparesis.     Discussed with Kaylin Triana  -plan to admit patient to [facility] for malnutrition, failure to thrive, neutropenia, c-diff. Bed requested. Inpatient transplant pulm aware.

## 2023-05-16 ENCOUNTER — HOSPITAL ENCOUNTER (OUTPATIENT)
Age: 63
End: 2023-05-16
Attending: INTERNAL MEDICINE
Payer: MEDICARE

## 2023-05-16 ENCOUNTER — TELEPHONE (OUTPATIENT)
Dept: TRANSPLANT | Facility: CLINIC | Age: 63
End: 2023-05-16

## 2023-05-16 DIAGNOSIS — Z94.2 S/P LUNG TRANSPLANT (H): ICD-10-CM

## 2023-05-16 DIAGNOSIS — D84.821 IMMUNOSUPPRESSION DUE TO CHRONIC STEROID USE (H): ICD-10-CM

## 2023-05-16 DIAGNOSIS — T38.0X5A IMMUNOSUPPRESSION DUE TO CHRONIC STEROID USE (H): ICD-10-CM

## 2023-05-16 DIAGNOSIS — D80.1 HYPOGAMMAGLOBULINEMIA (H): ICD-10-CM

## 2023-05-16 DIAGNOSIS — Z79.52 IMMUNOSUPPRESSION DUE TO CHRONIC STEROID USE (H): ICD-10-CM

## 2023-05-16 LAB
EBV DNA LOG OF COPIES (EXTERNAL): <1.54 LOG IU/ML
EBV DNA QUANT (EXTERNAL): <35 IU/ML
Lab: ABNORMAL
Lab: ABNORMAL

## 2023-05-16 NOTE — TELEPHONE ENCOUNTER
Patient Call: General  Route to LPN    Reason for call: called in regards of patient returning missed call from coordinator. Call back number is [phone].    Call back needed? Yes    Return Call Needed  Same as documented in contacts section  When to return call?: Same day: Route High Priority

## 2023-05-16 NOTE — TELEPHONE ENCOUNTER
Prior Authorization Approval    Authorization Effective Date: 5/15/2023  Authorization Expiration Date: 11/16/2023  Medication: Zarxio 300MCG/0.5ML syringes- pa approved  Approved Dose/Quantity: ud  Reference #:     Insurance Company: [employer] - Phone [phone] Fax [phone]  Expected CoPay: $1.45     CoPay Card Available:      Foundation Assistance Needed:    Which Pharmacy is filling the prescription (Not needed for infusion/clinic administered): [facility] - [place], [place] - [address]  Pharmacy Notified: Yes  Patient Notified: No

## 2023-05-16 NOTE — PROGRESS NOTES
Tacrolimus level 12.7 at 12 hours, on 5/12/23.  Goal 8-12.     Dose changed to 3 mls in AM, 2.5 mls in PM   Recheck level in a week    Discussed with patient    MyChart message sent

## 2023-05-16 NOTE — PROGRESS NOTES
Transfer Type: [facility]  Transfer Triage Note    Date of call: 05/16/23  Time of call: 5:53 PM    Current Patient Location:  Home  Current Level of Care: Outpatient    Vitals: None documented. No clinical concern regarding instability   Diagnosis:  Refractory C. Diff Colitis.  Neutropenia.  Bilateral lung transplant  Is COVID-19 a concern? No  Reason for requested transfer: Patient has established care here   Isolation Needs: Enteric    Outside Records: Available  Additional records may be faxed to [phone].    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 diff colitis, gastroparesis s/p GJ tube placement referred by RABIA Bush for direct admission from home due to ongoing issues with refractory C. difficile colitis, neutropenia etiology unclear and 20 pound weight loss.  Patient with her second bout of C. difficile on oral vancomycin.  No improvement in symptoms on this regimen.  Off tube feedings (for gastroparesis) for multiple weeks with 20 pound weight loss.  Neutropenia etiology unclear with last white count 1.8 with ANC by report of 0.8 or 0.9 for which patient received Neupogen 1 day ago. Off azothioprine. With persistent diarrhea, weight loss and clinical decline hospitalization on a MedSur unit requested, preferably 6C.  Impression that the patient is clinically stable not requiring referral to the ED.    Lucio MONZON  MD [place]     ADD:  As transplant < 1 year patient requires private bed on 6C which is presently not available.  Discussed with RABIA Bush.  Indicated OK for patient to remain home this evening with attempt to admit to 6C on 5/17.  If bed still not available will refer to ED.    Lucio Pappas MD

## 2023-05-17 ENCOUNTER — TELEPHONE (OUTPATIENT)
Dept: TRANSPLANT | Facility: CLINIC | Age: 63
End: 2023-05-17
Payer: MEDICARE

## 2023-05-17 ENCOUNTER — HOSPITAL ENCOUNTER (INPATIENT)
Facility: CLINIC | Age: 63
LOS: 6 days | Discharge: HOME | DRG: 391 | End: 2023-05-23
Attending: INTERNAL MEDICINE | Admitting: STUDENT IN AN ORGANIZED HEALTH CARE EDUCATION/TRAINING PROGRAM
Payer: MEDICARE

## 2023-05-17 ENCOUNTER — APPOINTMENT (OUTPATIENT)
Dept: GENERAL RADIOLOGY | Facility: CLINIC | Age: 63
DRG: 391 | End: 2023-05-17
Payer: MEDICARE

## 2023-05-17 ENCOUNTER — APPOINTMENT (OUTPATIENT)
Dept: GENERAL RADIOLOGY | Facility: CLINIC | Age: 63
DRG: 391 | End: 2023-05-17
Attending: INTERNAL MEDICINE
Payer: MEDICARE

## 2023-05-17 DIAGNOSIS — T86.91 TRANSPLANT REJECTION: ICD-10-CM

## 2023-05-17 DIAGNOSIS — N18.32 STAGE 3B CHRONIC KIDNEY DISEASE (H): Primary | ICD-10-CM

## 2023-05-17 DIAGNOSIS — Z99.2 DEPENDENCE ON RENAL DIALYSIS (H): ICD-10-CM

## 2023-05-17 DIAGNOSIS — Z99.2 ESRD (END STAGE RENAL DISEASE) ON DIALYSIS (H): ICD-10-CM

## 2023-05-17 DIAGNOSIS — R62.7 ADULT FAILURE TO THRIVE: ICD-10-CM

## 2023-05-17 DIAGNOSIS — N18.6 ESRD (END STAGE RENAL DISEASE) ON DIALYSIS (H): ICD-10-CM

## 2023-05-17 DIAGNOSIS — Z20.822 CONTACT WITH AND (SUSPECTED) EXPOSURE TO COVID-19: ICD-10-CM

## 2023-05-17 DIAGNOSIS — D72.819 LEUKOPENIA, UNSPECIFIED TYPE: ICD-10-CM

## 2023-05-17 DIAGNOSIS — R19.7 DIARRHEA OF PRESUMED INFECTIOUS ORIGIN: ICD-10-CM

## 2023-05-17 DIAGNOSIS — A04.72 C. DIFFICILE COLITIS: ICD-10-CM

## 2023-05-17 DIAGNOSIS — K52.9 OSMOTIC DIARRHEA: ICD-10-CM

## 2023-05-17 DIAGNOSIS — Z94.2 LUNG TRANSPLANT STATUS, BILATERAL (H): ICD-10-CM

## 2023-05-17 DIAGNOSIS — N18.6 END STAGE RENAL DISEASE (H): ICD-10-CM

## 2023-05-17 DIAGNOSIS — Z94.2 LUNG REPLACED BY TRANSPLANT (H): ICD-10-CM

## 2023-05-17 DIAGNOSIS — R62.7 FAILURE TO THRIVE IN ADULT: ICD-10-CM

## 2023-05-17 LAB
ALBUMIN SERPL BCG-MCNC: 4.3 G/DL (ref 3.5–5.2)
ALP SERPL-CCNC: 54 U/L (ref 35–104)
ALT SERPL W P-5'-P-CCNC: 12 U/L (ref 10–35)
ANION GAP SERPL CALCULATED.3IONS-SCNC: 11 MMOL/L (ref 7–15)
AST SERPL W P-5'-P-CCNC: 27 U/L (ref 10–35)
BACTERIA BRONCH: NO GROWTH
BACTERIA BRONCH: NO GROWTH
BASOPHILS # BLD MANUAL: 0 10E3/UL (ref 0–0.2)
BASOPHILS NFR BLD MANUAL: 0 %
BILIRUB SERPL-MCNC: 0.6 MG/DL
BUN SERPL-MCNC: 18 MG/DL (ref 8–23)
CALCIUM SERPL-MCNC: 9.7 MG/DL (ref 8.8–10.2)
CHLORIDE SERPL-SCNC: 103 MMOL/L (ref 98–107)
CREAT SERPL-MCNC: 5.14 MG/DL (ref 0.51–0.95)
CRP SERPL-MCNC: 16 MG/L
DEPRECATED HCO3 PLAS-SCNC: 28 MMOL/L (ref 22–29)
EOSINOPHIL # BLD MANUAL: 0 10E3/UL (ref 0–0.7)
EOSINOPHIL NFR BLD MANUAL: 1 %
ERYTHROCYTE [DISTWIDTH] IN BLOOD BY AUTOMATED COUNT: 17.3 % (ref 10–15)
ERYTHROCYTE [SEDIMENTATION RATE] IN BLOOD BY WESTERGREN METHOD: 24 MM/HR (ref 0–30)
FLUAV RNA SPEC QL NAA+PROBE: NEGATIVE
FLUBV RNA RESP QL NAA+PROBE: NEGATIVE
GFR SERPL CREATININE-BSD FRML MDRD: 9 ML/MIN/1.73M2
GLUCOSE SERPL-MCNC: 161 MG/DL (ref 70–99)
HCO3 BLDV-SCNC: 31 MMOL/L (ref 21–28)
HCT VFR BLD AUTO: 37.6 % (ref 35–47)
HGB BLD-MCNC: 11.7 G/DL (ref 11.7–15.7)
INR PPP: 0.98 (ref 0.85–1.15)
LACTATE BLD-SCNC: 1.2 MMOL/L
LACTATE SERPL-SCNC: 1.6 MMOL/L (ref 0.7–2)
LIPASE SERPL-CCNC: 20 U/L (ref 13–60)
LYMPHOCYTES # BLD MANUAL: 0.4 10E3/UL (ref 0.8–5.3)
LYMPHOCYTES NFR BLD MANUAL: 20 %
MAGNESIUM SERPL-MCNC: 2 MG/DL (ref 1.7–2.3)
MCH RBC QN AUTO: 38.5 PG (ref 26.5–33)
MCHC RBC AUTO-ENTMCNC: 31.1 G/DL (ref 31.5–36.5)
MCV RBC AUTO: 124 FL (ref 78–100)
MONOCYTES # BLD MANUAL: 0.4 10E3/UL (ref 0–1.3)
MONOCYTES NFR BLD MANUAL: 19 %
NEUTROPHILS # BLD MANUAL: 1.3 10E3/UL (ref 1.6–8.3)
NEUTROPHILS NFR BLD MANUAL: 59 %
PCO2 BLDV: 53 MM HG (ref 40–50)
PH BLDV: 7.37 [PH] (ref 7.32–7.43)
PLAT MORPH BLD: ABNORMAL
PLATELET # BLD AUTO: 215 10E3/UL (ref 150–450)
PO2 BLDV: 35 MM HG (ref 25–47)
POTASSIUM SERPL-SCNC: 3.9 MMOL/L (ref 3.4–5.3)
PROCALCITONIN SERPL IA-MCNC: 0.34 NG/ML
PROMYELOCYTES # BLD MANUAL: 0 10E3/UL
PROMYELOCYTES NFR BLD MANUAL: 1 %
PROT SERPL-MCNC: 7.1 G/DL (ref 6.4–8.3)
RBC # BLD AUTO: 3.04 10E6/UL (ref 3.8–5.2)
RBC MORPH BLD: ABNORMAL
RSV RNA SPEC NAA+PROBE: NEGATIVE
SAO2 % BLDV: 64 % (ref 94–100)
SARS-COV-2 RNA RESP QL NAA+PROBE: NEGATIVE
SODIUM SERPL-SCNC: 142 MMOL/L (ref 136–145)
TROPONIN T SERPL HS-MCNC: 148 NG/L
TROPONIN T SERPL HS-MCNC: 167 NG/L
WBC # BLD AUTO: 2.2 10E3/UL (ref 4–11)

## 2023-05-17 PROCEDURE — 214N000001 HC R&B CCU UMMC

## 2023-05-17 PROCEDURE — 99285 EMERGENCY DEPT VISIT HI MDM: CPT | Mod: 25 | Performed by: INTERNAL MEDICINE

## 2023-05-17 PROCEDURE — 96360 HYDRATION IV INFUSION INIT: CPT | Performed by: INTERNAL MEDICINE

## 2023-05-17 PROCEDURE — 83605 ASSAY OF LACTIC ACID: CPT

## 2023-05-17 PROCEDURE — 85007 BL SMEAR W/DIFF WBC COUNT: CPT | Performed by: INTERNAL MEDICINE

## 2023-05-17 PROCEDURE — 74018 RADEX ABDOMEN 1 VIEW: CPT | Mod: 26 | Performed by: STUDENT IN AN ORGANIZED HEALTH CARE EDUCATION/TRAINING PROGRAM

## 2023-05-17 PROCEDURE — 87040 BLOOD CULTURE FOR BACTERIA: CPT | Performed by: INTERNAL MEDICINE

## 2023-05-17 PROCEDURE — 85652 RBC SED RATE AUTOMATED: CPT | Performed by: INTERNAL MEDICINE

## 2023-05-17 PROCEDURE — 250N000012 HC RX MED GY IP 250 OP 636 PS 637

## 2023-05-17 PROCEDURE — 74018 RADEX ABDOMEN 1 VIEW: CPT

## 2023-05-17 PROCEDURE — 86140 C-REACTIVE PROTEIN: CPT | Performed by: INTERNAL MEDICINE

## 2023-05-17 PROCEDURE — 36415 COLL VENOUS BLD VENIPUNCTURE: CPT | Performed by: INTERNAL MEDICINE

## 2023-05-17 PROCEDURE — 83735 ASSAY OF MAGNESIUM: CPT | Performed by: INTERNAL MEDICINE

## 2023-05-17 PROCEDURE — 99222 1ST HOSP IP/OBS MODERATE 55: CPT | Mod: AI | Performed by: STUDENT IN AN ORGANIZED HEALTH CARE EDUCATION/TRAINING PROGRAM

## 2023-05-17 PROCEDURE — C9803 HOPD COVID-19 SPEC COLLECT: HCPCS | Performed by: INTERNAL MEDICINE

## 2023-05-17 PROCEDURE — 71046 X-RAY EXAM CHEST 2 VIEWS: CPT | Mod: 26 | Performed by: STUDENT IN AN ORGANIZED HEALTH CARE EDUCATION/TRAINING PROGRAM

## 2023-05-17 PROCEDURE — 80197 ASSAY OF TACROLIMUS: CPT | Performed by: INTERNAL MEDICINE

## 2023-05-17 PROCEDURE — 83605 ASSAY OF LACTIC ACID: CPT | Performed by: INTERNAL MEDICINE

## 2023-05-17 PROCEDURE — 250N000013 HC RX MED GY IP 250 OP 250 PS 637

## 2023-05-17 PROCEDURE — 85027 COMPLETE CBC AUTOMATED: CPT | Performed by: INTERNAL MEDICINE

## 2023-05-17 PROCEDURE — 258N000003 HC RX IP 258 OP 636: Performed by: INTERNAL MEDICINE

## 2023-05-17 PROCEDURE — 93005 ELECTROCARDIOGRAM TRACING: CPT | Performed by: INTERNAL MEDICINE

## 2023-05-17 PROCEDURE — 80053 COMPREHEN METABOLIC PANEL: CPT | Performed by: INTERNAL MEDICINE

## 2023-05-17 PROCEDURE — 71046 X-RAY EXAM CHEST 2 VIEWS: CPT

## 2023-05-17 PROCEDURE — 36415 COLL VENOUS BLD VENIPUNCTURE: CPT

## 2023-05-17 PROCEDURE — 84484 ASSAY OF TROPONIN QUANT: CPT | Performed by: INTERNAL MEDICINE

## 2023-05-17 PROCEDURE — 93010 ELECTROCARDIOGRAM REPORT: CPT | Performed by: INTERNAL MEDICINE

## 2023-05-17 PROCEDURE — 85610 PROTHROMBIN TIME: CPT | Performed by: INTERNAL MEDICINE

## 2023-05-17 PROCEDURE — 84145 PROCALCITONIN (PCT): CPT | Performed by: INTERNAL MEDICINE

## 2023-05-17 PROCEDURE — 84484 ASSAY OF TROPONIN QUANT: CPT

## 2023-05-17 PROCEDURE — 83690 ASSAY OF LIPASE: CPT | Performed by: INTERNAL MEDICINE

## 2023-05-17 PROCEDURE — 87637 SARSCOV2&INF A&B&RSV AMP PRB: CPT | Performed by: INTERNAL MEDICINE

## 2023-05-17 RX ORDER — LIDOCAINE 40 MG/G
CREAM TOPICAL
Status: DISCONTINUED | OUTPATIENT
Start: 2023-05-17 | End: 2023-05-23 | Stop reason: HOSPADM

## 2023-05-17 RX ORDER — L. ACIDOPHILUS/PECTIN, CITRUS 25MM-100MG
1 TABLET ORAL
Status: DISCONTINUED | OUTPATIENT
Start: 2023-05-18 | End: 2023-05-17

## 2023-05-17 RX ORDER — METOPROLOL TARTRATE 25 MG/1
25 TABLET, FILM COATED ORAL 2 TIMES DAILY
Status: DISCONTINUED | OUTPATIENT
Start: 2023-05-17 | End: 2023-05-23 | Stop reason: HOSPADM

## 2023-05-17 RX ORDER — ACETAMINOPHEN 650 MG/1
650 SUPPOSITORY RECTAL EVERY 6 HOURS PRN
Status: DISCONTINUED | OUTPATIENT
Start: 2023-05-17 | End: 2023-05-23 | Stop reason: HOSPADM

## 2023-05-17 RX ORDER — PREDNISONE 5 MG/1
5 TABLET ORAL EVERY MORNING
Status: DISCONTINUED | OUTPATIENT
Start: 2023-05-18 | End: 2023-05-23 | Stop reason: HOSPADM

## 2023-05-17 RX ORDER — LACTOBACILLUS RHAMNOSUS GG 10B CELL
1 CAPSULE ORAL 2 TIMES DAILY
Status: DISCONTINUED | OUTPATIENT
Start: 2023-05-18 | End: 2023-05-21

## 2023-05-17 RX ORDER — TACROLIMUS 1 MG/1
3 CAPSULE ORAL
Status: DISCONTINUED | OUTPATIENT
Start: 2023-05-17 | End: 2023-05-17

## 2023-05-17 RX ORDER — GUAR GUM
1 PACKET (EA) ORAL
Status: DISCONTINUED | OUTPATIENT
Start: 2023-05-18 | End: 2023-05-23 | Stop reason: HOSPADM

## 2023-05-17 RX ORDER — VANCOMYCIN HYDROCHLORIDE 125 MG/1
125 CAPSULE ORAL 4 TIMES DAILY
Status: DISCONTINUED | OUTPATIENT
Start: 2023-05-17 | End: 2023-05-18

## 2023-05-17 RX ORDER — PREDNISONE 2.5 MG/1
2.5 TABLET ORAL EVERY EVENING
Status: DISCONTINUED | OUTPATIENT
Start: 2023-05-17 | End: 2023-05-23 | Stop reason: HOSPADM

## 2023-05-17 RX ORDER — SODIUM CHLORIDE 9 MG/ML
INJECTION, SOLUTION INTRAVENOUS CONTINUOUS
Status: DISCONTINUED | OUTPATIENT
Start: 2023-05-17 | End: 2023-05-17

## 2023-05-17 RX ORDER — LOPERAMIDE HCL 2 MG
2 CAPSULE ORAL 4 TIMES DAILY PRN
Status: DISCONTINUED | OUTPATIENT
Start: 2023-05-17 | End: 2023-05-18

## 2023-05-17 RX ORDER — UREA 10 %
500 LOTION (ML) TOPICAL DAILY
Status: DISCONTINUED | OUTPATIENT
Start: 2023-05-18 | End: 2023-05-23 | Stop reason: HOSPADM

## 2023-05-17 RX ORDER — ACETAMINOPHEN 325 MG/1
650 TABLET ORAL EVERY 6 HOURS PRN
Status: DISCONTINUED | OUTPATIENT
Start: 2023-05-17 | End: 2023-05-23 | Stop reason: HOSPADM

## 2023-05-17 RX ADMIN — METOPROLOL TARTRATE 25 MG: 25 TABLET, FILM COATED ORAL at 20:33

## 2023-05-17 RX ADMIN — Medication 40 MG: at 21:44

## 2023-05-17 RX ADMIN — TACROLIMUS 2.5 MG: 5 CAPSULE ORAL at 21:44

## 2023-05-17 RX ADMIN — SODIUM CHLORIDE 1000 ML: 9 INJECTION, SOLUTION INTRAVENOUS at 17:04

## 2023-05-17 RX ADMIN — PREDNISONE 2.5 MG: 2.5 TABLET ORAL at 21:44

## 2023-05-17 ASSESSMENT — ENCOUNTER SYMPTOMS
NAUSEA: 0
CHILLS: 0
CONFUSION: 0
ADENOPATHY: 0
LIGHT-HEADEDNESS: 0
VOMITING: 0
FEVER: 0
BLOOD IN STOOL: 0
DIFFICULTY URINATING: 0
SHORTNESS OF BREATH: 1
PALPITATIONS: 0
ABDOMINAL PAIN: 0
HEADACHES: 0
UNEXPECTED WEIGHT CHANGE: 1
WHEEZING: 0
DIARRHEA: 1
TROUBLE SWALLOWING: 0
COUGH: 0

## 2023-05-17 ASSESSMENT — ACTIVITIES OF DAILY LIVING (ADL)
ADLS_ACUITY_SCORE: 35

## 2023-05-17 NOTE — ED TRIAGE NOTES
Low WBC count, has lost 24 lb in 5 weeks, c/o weakness and is malnourished per pt (feeding tube and gets dialysis).  When she uses feeding tube, diarrhea doubles (has c diff currently as well, taking vanco)    Last HD run was yesterday (T, Th, Sat)  Had double lung txp 10.5 months ago

## 2023-05-17 NOTE — TELEPHONE ENCOUNTER
Called patient to inform her that a direct admission is not possible today.  Requested that patient report to the ED for the following:  malnutrition, failure to thrive, neutropenia, c-diff.    Patient will report to [facility] ED.  Patient needs to arrange a ride so may take a couple of hours.

## 2023-05-17 NOTE — ED PROVIDER NOTES
ED Provider Note  [facility]      History     Chief Complaint   Patient presents with     Abnormal Labs     Diarrhea     HPI  Sofie Rodriguez is a 62 year old female who presents with persistent c diff colitis and failure to thrive. 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 Recurrent c diff colitis, neurtopenia, bilateral pleural effusions, right phrenic palsy, chronic hypercapnea, gastroparesis, GJ tube and ESRD on hemodialysis.. She was referred from her primary clinic for ongoing refractory c diff colitis, neutropenia and 20 lb weight loss. Her primary attempted to arrange direct admission to internal medicine yesterday, but the were no available beds. She has had diarrhea since her transplant. She had a feeding tube but stopped using this due to worsening of diarrhea. She was diagnosed with C diff about a week ago and has been on oral vancomycin without improvement of diarrhea. She has lost 24 lbs in the past 5 weeks. She has some bruising at blood draw sites. SHe has no other abnormal bleeding and no blood in the stool. She has no fever, chills or sweats. She feels generally weak. SHe has been attending pulmonary rehab. She has no worsening of breathing. She has no chest pain, palpitations or wheezing. She has some chronic nausea and anorexia, but is not vomiting. She denies abdominal pain, leg pain or swelling. Her WBC has been progressively falling. She takes tacrolimus in the morning at about 8 AM. She denies recent changes in medications other than the recent addition of vancomycin. She had new leukopenia over the past week.    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     Hepatitis 2017    Hep C, [facility]     HTN (hypertension)      Lung infection 11/30/2022     Osteopenia        Past Surgical  History:   Procedure Laterality Date     BRONCHOSCOPY (RIGID OR FLEXIBLE), DIAGNOSTIC N/A 8/2/2022    Procedure: BRONCHOSCOPY, DIAGNOSTIC- inspection Bronch;  Surgeon: Kamala Lovell MD;  Location:  GI     BRONCHOSCOPY (RIGID OR FLEXIBLE), DIAGNOSTIC N/A 9/13/2022    Procedure: INSPECTION BRONCHOSCOPY, WITH BRONCHOALVEOLAR LAVAGE;  Surgeon: Jose R Mccullough MD;  Location:  GI     BRONCHOSCOPY (RIGID OR FLEXIBLE), DIAGNOSTIC N/A 11/9/2022    Procedure: BRONCHOSCOPY, WITH BRONCHOALVEOLAR LAVAGE AND BIOPSY;  Surgeon: Cesar Lima MD;  Location:  GI     BRONCHOSCOPY (RIGID OR FLEXIBLE), DIAGNOSTIC N/A 1/25/2023    Procedure: BRONCHOSCOPY, WITH BRONCHOALVEOLAR LAVAGE AND BIOPSY;  Surgeon: Mason Reddy MD;  Location:  GI     BRONCHOSCOPY (RIGID OR FLEXIBLE), DIAGNOSTIC N/A 4/19/2023    Procedure: BRONCHOSCOPY, WITH BRONCHOALVEOLAR LAVAGE AND BIOPSY;  Surgeon: Kamala Lovell MD;  Location:  GI     BRONCHOSCOPY FLEXIBLE AND RIGID N/A 07/19/2022    Procedure: BRONCHOSCOPY inspection only;  Surgeon: Bob Liao MD;  Location:  GI     COLONOSCOPY  2015     CV CORONARY ANGIOGRAM N/A 06/30/2021    Procedure: CV CORONARY ANGIOGRAM;  Surgeon: Alexander Cuellar MD;  Location:  HEART CARDIAC CATH LAB     CV RIGHT HEART CATH MEASUREMENTS RECORDED N/A 06/30/2021    Procedure: CV RIGHT HEART CATH;  Surgeon: Alexander Cuellar MD;  Location:  HEART CARDIAC CATH LAB     ENDOSCOPIC RETROGRADE CHOLANGIOPANCREATOGRAM N/A 8/11/2022    Procedure: ENDOSCOPIC RETROGRADE CHOLANGIOPANCREATOGRAPHY WITH PANCREATIC DUCT NEEDLE KNIFE AND STENT PLACEMENT, BILE DUCT SPHINCTEROTOMY, BLOOD/DEBRIS REMOVAL AND STENT PLACEMENT;  Surgeon: Cosmo Arroyo MD;  Location:  OR     ENDOSCOPIC RETROGRADE CHOLANGIOPANCREATOGRAM N/A 10/7/2022    Procedure: ENDOSCOPIC RETROGRADE CHOLANGIOPANCREATOGRAPHY with biliary and pancreatic stent removal, debris removal;  Surgeon: Cosmo Arroyo MD;  Location:  OR      ENT SURGERY  1974    tonsillectomy     ENTEROSCOPY SMALL BOWEL N/A 8/11/2022    Procedure: SMALL BOWEL ENTEROSCOPY;  Surgeon: Cosmo Arroyo MD;  Location: [facility] OR     ESOPHAGOGASTRODUODENOSCOPY, WITH NASOGASTRIC TUBE INSERTION N/A 07/01/2022    Procedure: ESOPHAGOGASTRODUODENOSCOPY, WITH NASOJEJUNAL TUBE INSERTION;  Surgeon: Ozzy Nickerson MD;  Location: [facility] GI     ESOPHAGOSCOPY, GASTROSCOPY, DUODENOSCOPY (EGD), COMBINED N/A 8/3/2022    Procedure: ESOPHAGOGASTRODUODENOSCOPY (EGD);  Surgeon: Ira Andres MD;  Location: [facility]     ESOPHAGOSCOPY, GASTROSCOPY, DUODENOSCOPY (EGD), COMBINED N/A 1/25/2023    Procedure: ESOPHAGOGASTRODUODENOSCOPY (EGD) with botox injection;  Surgeon: Gonzalez Navarro MD;  Location: [facility] GI     HAND SURGERY       INSERT CHEST TUBE Right 9/13/2022    Procedure: Insert chest tube;  Surgeon: Jose R Mccullough MD;  Location: [facility]     IR CVC TUNNEL PLACEMENT > 5 YRS OF AGE  9/26/2022     IR GASTRO JEJUNOSTOMY TUBE CHANGE  8/31/2022     IR GASTRO JEJUNOSTOMY TUBE CHANGE  12/21/2022     IR GASTRO JEJUNOSTOMY TUBE PLACEMENT  7/27/2022     IR THORACENTESIS  8/29/2022     LEEP TX, CERVICAL  04/07/2017    HECTOR III     LYMPH NODE BIOPSY Left 2005    Left axilla, benign- [place]     MIDLINE INSERTION - DOUBLE LUMEN Left 07/28/2022    20cm, Basilic vein     REPLACE GASTROJEJUNOSTOMY TUBE, PERCUTANEOUS  10/7/2022    Procedure: Replace Gastrojejunostomy Tube;  Surgeon: Cosmo Arroyo MD;  Location: [facility]     THORACENTESIS Left 8/29/2022    Procedure: THORACENTESIS;  Surgeon: Bo Capone PA-C;  Location: [facility] OR     THORACENTESIS Left 9/13/2022    Procedure: Thoracentesis;  Surgeon: Jose R Mccullough MD;  Location: [facility]     THROMBECTOMY UPPER EXTREMITY Left 07/02/2022    Procedure: LEFT RADIAL ARM THROMBECTOMY;  Surgeon: Christie Graham MD;  Location: [facility]     TRANSPLANT LUNG RECIPIENT SINGLE X2 Bilateral 06/28/2022    Procedure: Clamshell Incision,  Bilateral Sequential Lung Transplant, On Cardiopulmonary Bypass, Flexible Bronchoscopy;  Surgeon: Sue Sunshine MD;  Location:  OR       No family history on file.    Social History     Tobacco Use     Smoking status: Former     Years: 30.00     Types: Cigarettes     Quit date: 2020     Years since quittin.5     Smokeless tobacco: Never   Vaping Use     Vaping status: Not on file   Substance Use Topics     Alcohol use: Not Currently         Review of Systems   Constitutional: Positive for unexpected weight change. Negative for chills and fever.   HENT: Negative for congestion and trouble swallowing.    Eyes: Negative for visual disturbance.   Respiratory: Positive for shortness of breath. Negative for cough and wheezing.    Cardiovascular: Negative for chest pain, palpitations and leg swelling.   Gastrointestinal: Positive for diarrhea. Negative for abdominal pain, blood in stool, nausea and vomiting.   Genitourinary: Negative for difficulty urinating.   Skin: Negative for rash.   Neurological: Negative for syncope, light-headedness and headaches.   Hematological: Negative for adenopathy.   Psychiatric/Behavioral: Negative for confusion.       Physical Exam   BP: 109/62  Pulse: 88  Temp: 98.4  F (36.9  C)  Resp: 16  SpO2: 96 %  Physical Exam  Vitals and nursing note reviewed.   Constitutional:       General: She is not in acute distress.     Appearance: Normal appearance. She is underweight.   HENT:      Head: Normocephalic and atraumatic.      Right Ear: External ear normal.      Left Ear: External ear normal.      Nose: Nose normal.      Mouth/Throat:      Mouth: Mucous membranes are moist.   Eyes:      General: No scleral icterus.     Extraocular Movements: Extraocular movements intact.      Pupils: Pupils are equal, round, and reactive to light.   Cardiovascular:      Rate and Rhythm: Normal rate and regular rhythm.      Heart sounds: No murmur heard.  Pulmonary:      Effort: Pulmonary effort is  normal. No respiratory distress.      Breath sounds: Examination of the right-middle field reveals decreased breath sounds. Examination of the right-lower field reveals decreased breath sounds. Decreased breath sounds present.      Comments: Decreased BS right base  Abdominal:      General: Abdomen is flat. There is no distension.      Palpations: Abdomen is soft.      Tenderness: There is no guarding or rebound.   Musculoskeletal:      Cervical back: Normal range of motion and neck supple.      Right lower leg: No edema.      Left lower leg: No edema.   Skin:     General: Skin is warm and dry.      Coloration: Skin is pale.   Neurological:      General: No focal deficit present.      Mental Status: She is alert and oriented to person, place, and time.      Cranial Nerves: No cranial nerve deficit.      Sensory: No sensory deficit.      Motor: No weakness.   Psychiatric:         Mood and Affect: Mood normal.         Behavior: Behavior normal.           ED Course, Procedures, & Data      Procedures       ED Course Selections:        EKG Interpretation:      Interpreted by UNRULY BOYCE MD  Time reviewed: 1650  Symptoms at time of EKG: None   Rhythm: normal sinus   Rate: Normal  Axis: Normal  Ectopy: none  Conduction: normal  ST Segments/ T Waves: T wave inversion III, aVF, V1, V2, V3, V4 and V5  Q Waves: none  Comparison to prior: More prominent anterolateral T wave inversion c/w 2/28/23    Clinical Impression: non-specific EKG                Results for orders placed or performed during the hospital encounter of 05/17/23   XR Chest 2 Views     Status: None (Preliminary result)    Impression    RESIDENT PRELIMINARY INTERPRETATION  IMPRESSION:   Postoperative changes of prior bilateral lung transplantation.  Unchanged left pleural effusion/atelectasis. No acute pulmonary  abnormality.   INR     Status: Normal   Result Value Ref Range    INR 0.98 0.85 - 1.15   Comprehensive metabolic panel     Status: Abnormal    Result Value Ref Range    Sodium 142 136 - 145 mmol/L    Potassium 3.9 3.4 - 5.3 mmol/L    Chloride 103 98 - 107 mmol/L    Carbon Dioxide (CO2) 28 22 - 29 mmol/L    Anion Gap 11 7 - 15 mmol/L    Urea Nitrogen 18.0 8.0 - 23.0 mg/dL    Creatinine 5.14 (H) 0.51 - 0.95 mg/dL    Calcium 9.7 8.8 - 10.2 mg/dL    Glucose 161 (H) 70 - 99 mg/dL    Alkaline Phosphatase 54 35 - 104 U/L    AST 27 10 - 35 U/L    ALT 12 10 - 35 U/L    Protein Total 7.1 6.4 - 8.3 g/dL    Albumin 4.3 3.5 - 5.2 g/dL    Bilirubin Total 0.6 <=1.2 mg/dL    GFR Estimate 9 (L) >60 mL/min/1.73m2   Lipase     Status: Normal   Result Value Ref Range    Lipase 20 13 - 60 U/L   Lactic acid whole blood     Status: Normal   Result Value Ref Range    Lactic Acid 1.6 0.7 - 2.0 mmol/L   Troponin T, High Sensitivity     Status: Abnormal   Result Value Ref Range    Troponin T, High Sensitivity 167 (HH) <=14 ng/L   Magnesium     Status: Normal   Result Value Ref Range    Magnesium 2.0 1.7 - 2.3 mg/dL   CRP inflammation     Status: Abnormal   Result Value Ref Range    CRP Inflammation 16.00 (H) <5.00 mg/L   Erythrocyte sedimentation rate auto     Status: Normal   Result Value Ref Range    Erythrocyte Sedimentation Rate 24 0 - 30 mm/hr   Procalcitonin     Status: Abnormal   Result Value Ref Range    Procalcitonin 0.34 (H) <0.05 ng/mL   Asymptomatic Influenza A/B, RSV, & SARS-CoV2 PCR (COVID-19) Nose     Status: Normal    Specimen: Nose; Swab   Result Value Ref Range    Influenza A PCR Negative Negative    Influenza B PCR Negative Negative    RSV PCR Negative Negative    SARS CoV2 PCR Negative Negative    Narrative    Testing was performed using the Xpert Xpress CoV2/Flu/RSV Assay on the Flexispert Instrument. This test should be ordered for the detection of SARS-CoV-2, influenza, and RSV viruses in individuals who meet clinical and/or epidemiological criteria. Test performance is unknown in asymptomatic patients. This test is for in vitro diagnostic use under  the FDA EUA for laboratories certified under CLIA to perform high or moderate complexity testing. This test has not been FDA cleared or approved. A negative result does not rule out the presence of PCR inhibitors in the specimen or target RNA in concentration below the limit of detection for the assay. If only one viral target is positive but coinfection with multiple targets is suspected, the sample should be re-tested with another FDA cleared, approved, or authorized test, if coinfection would change clinical management. This test was validated by the [facility]. These laboratories are certified under the Clinical Laboratory Improvement Amendments of 1988 (CLIA-88) as qualified to perform high complexity laboratory testing.   CBC with platelets and differential     Status: Abnormal   Result Value Ref Range    WBC Count 2.2 (L) 4.0 - 11.0 10e3/uL    RBC Count 3.04 (L) 3.80 - 5.20 10e6/uL    Hemoglobin 11.7 11.7 - 15.7 g/dL    Hematocrit 37.6 35.0 - 47.0 %     (H) 78 - 100 fL    MCH 38.5 (H) 26.5 - 33.0 pg    MCHC 31.1 (L) 31.5 - 36.5 g/dL    RDW 17.3 (H) 10.0 - 15.0 %    Platelet Count 215 150 - 450 10e3/uL   iStat Gases (lactate) venous, POCT     Status: Abnormal   Result Value Ref Range    Lactic Acid POCT 1.2 <=2.0 mmol/L    Bicarbonate Venous POCT 31 (H) 21 - 28 mmol/L    O2 Sat, Venous POCT 64 (L) 94 - 100 %    pCO2V Venous POCT 53 (H) 40 - 50 mm Hg    pH Venous POCT 7.37 7.32 - 7.43    pO2 Venous POCT 35 25 - 47 mm Hg   Manual Differential     Status: Abnormal   Result Value Ref Range    % Neutrophils 59 %    % Lymphocytes 20 %    % Monocytes 19 %    % Eosinophils 1 %    % Basophils 0 %    % Promyelocytes 1 %    Absolute Neutrophils 1.3 (L) 1.6 - 8.3 10e3/uL    Absolute Lymphocytes 0.4 (L) 0.8 - 5.3 10e3/uL    Absolute Monocytes 0.4 0.0 - 1.3 10e3/uL    Absolute Eosinophils 0.0 0.0 - 0.7 10e3/uL    Absolute Basophils 0.0 0.0 - 0.2 10e3/uL    Absolute Promyelocytes 0.0 <=0.0 10e3/uL     RBC Morphology Confirmed RBC Indices     Platelet Assessment  Automated Count Confirmed. Platelet morphology is normal.     Automated Count Confirmed. Platelet morphology is normal.   EKG 12-lead, tracing only     Status: None (Preliminary result)   Result Value Ref Range    Systolic Blood Pressure  mmHg    Diastolic Blood Pressure  mmHg    Ventricular Rate 88 BPM    Atrial Rate 88 BPM    MS Interval 132 ms    QRS Duration 72 ms     ms    QTc 442 ms    P Axis 50 degrees    R AXIS 9 degrees    T Axis 9 degrees    Interpretation ECG       Sinus rhythm  Left atrial enlargement  Left ventricular hypertrophy  T wave abnormality, consider anterolateral ischemia  Abnormal ECG     CBC with platelets differential     Status: Abnormal    Narrative    The following orders were created for panel order CBC with platelets differential.  Procedure                               Abnormality         Status                     ---------                               -----------         ------                     CBC with platelets and d...[[id number]]  Abnormal            Final result               Manual Differential[[id number]]          Abnormal            Final result                 Please view results for these tests on the individual orders.     Medications   0.9% sodium chloride BOLUS (1,000 mLs Intravenous $New Bag 5/17/23 5886)     Labs Ordered and Resulted from Time of ED Arrival to Time of ED Departure   COMPREHENSIVE METABOLIC PANEL - Abnormal       Result Value    Sodium 142      Potassium 3.9      Chloride 103      Carbon Dioxide (CO2) 28      Anion Gap 11      Urea Nitrogen 18.0      Creatinine 5.14 (*)     Calcium 9.7      Glucose 161 (*)     Alkaline Phosphatase 54      AST 27      ALT 12      Protein Total 7.1      Albumin 4.3      Bilirubin Total 0.6      GFR Estimate 9 (*)    TROPONIN T, HIGH SENSITIVITY - Abnormal    Troponin T, High Sensitivity 167 (*)    CRP INFLAMMATION - Abnormal    CRP Inflammation  16.00 (*)    PROCALCITONIN - Abnormal    Procalcitonin 0.34 (*)    CBC WITH PLATELETS AND DIFFERENTIAL - Abnormal    WBC Count 2.2 (*)     RBC Count 3.04 (*)     Hemoglobin 11.7      Hematocrit 37.6       (*)     MCH 38.5 (*)     MCHC 31.1 (*)     RDW 17.3 (*)     Platelet Count 215     ISTAT GASES LACTATE VENOUS POCT - Abnormal    Lactic Acid POCT 1.2      Bicarbonate Venous POCT 31 (*)     O2 Sat, Venous POCT 64 (*)     pCO2V Venous POCT 53 (*)     pH Venous POCT 7.37      pO2 Venous POCT 35     DIFFERENTIAL - Abnormal    % Neutrophils 59      % Lymphocytes 20      % Monocytes 19      % Eosinophils 1      % Basophils 0      % Promyelocytes 1      Absolute Neutrophils 1.3 (*)     Absolute Lymphocytes 0.4 (*)     Absolute Monocytes 0.4      Absolute Eosinophils 0.0      Absolute Basophils 0.0      Absolute Promyelocytes 0.0      RBC Morphology Confirmed RBC Indices      Platelet Assessment        Value: Automated Count Confirmed. Platelet morphology is normal.   INR - Normal    INR 0.98     LIPASE - Normal    Lipase 20     LACTIC ACID WHOLE BLOOD - Normal    Lactic Acid 1.6     MAGNESIUM - Normal    Magnesium 2.0     ERYTHROCYTE SEDIMENTATION RATE AUTO - Normal    Erythrocyte Sedimentation Rate 24     INFLUENZA A/B, RSV, & SARS-COV2 PCR - Normal    Influenza A PCR Negative      Influenza B PCR Negative      RSV PCR Negative      SARS CoV2 PCR Negative     ROUTINE UA WITH MICROSCOPIC REFLEX TO CULTURE   TACROLIMUS BY TANDEM MASS SPECTROMETRY   BLOOD CULTURE   BLOOD CULTURE     XR Chest 2 Views   Preliminary Result   RESIDENT PRELIMINARY INTERPRETATION   IMPRESSION:    Postoperative changes of prior bilateral lung transplantation.   Unchanged left pleural effusion/atelectasis. No acute pulmonary   abnormality.             Critical care was not performed.     Medical Decision Making  The patient's presentation was of moderate complexity (a chronic illness mild to moderate exacerbation, progression, or side  effect of treatment).    The patient's evaluation involved:  ordering and/or review of 3+ test(s) in this encounter (see separate area of note for details)    The patient's management necessitated high risk (a decision regarding hospitalization).      Assessment & Plan    Impression:  Middle aged woman with history of bilateral lung transplant, COPD, ESRD on hemodialysis T/TH/S, ASCVD, EBV viremia, hepatitis C, post operative phrenic dysfunction, gastroparesis, GJ tube, presents with generalized failure to thrive. She has discontinued tube feeding due to intolerance. She has lost 24 pounds over the past 5 weeks. She has had diarrhea and was recently diagnosed with C diff infection, treated with oral vancomycin over the past 7 days. She developed new leukocytosis over the past week, though does not have absolute neutropenia. She has been followed in the transplant clinic. Her providers have been seeking hospital admission which has been delayed due to bed availability. She is currently afebrile and non toxic appearing. CXR has small left pleural effusion with clear, hyperinflated lungs. WBC today is 2.2 K which is somewhat improved from yesterday. She has had no recent changes in immunosuppressant dosing. Troponin is elevated at 160. There is some more prominent anterolateral T wave inversion than on past EKGs. She had only mild CAD on coronary angiogram in 2021 and had normal echocardiogram in Feb 2023. In setting of ESRD troponin is difficult to interpret.Her electrolytes are normal. LFT normal. Procalcitonin is elevated at 3.4 and CRP is mildly elevated at 16. She will be admitted to the internal medicine service.    I have reviewed the nursing notes. I have reviewed the findings, diagnosis, plan and need for follow up with the patient.    New Prescriptions    No medications on file       Final diagnoses:   C. difficile colitis   Diarrhea of presumed infectious origin   Failure to thrive in adult   Lung transplant  status, bilateral (H)   Leukopenia, unspecified type   ESRD (end stage renal disease) on dialysis (H)       Leonard Garcia  [facility] EMERGENCY DEPARTMENT  5/17/2023     Leonard Garcia MD  05/17/23 [id number]

## 2023-05-18 LAB
ALBUMIN SERPL BCG-MCNC: 3.7 G/DL (ref 3.5–5.2)
ALBUMIN UR-MCNC: 30 MG/DL
ALP SERPL-CCNC: 48 U/L (ref 35–104)
ALT SERPL W P-5'-P-CCNC: 9 U/L (ref 10–35)
ANION GAP SERPL CALCULATED.3IONS-SCNC: 10 MMOL/L (ref 7–15)
APPEARANCE UR: ABNORMAL
AST SERPL W P-5'-P-CCNC: 26 U/L (ref 10–35)
ATRIAL RATE - MUSE: 88 BPM
BACTERIA #/AREA URNS HPF: ABNORMAL /HPF
BASOPHILS # BLD MANUAL: 0 10E3/UL (ref 0–0.2)
BASOPHILS NFR BLD MANUAL: 0 %
BILIRUB SERPL-MCNC: 0.6 MG/DL
BILIRUB UR QL STRIP: NEGATIVE
BUN SERPL-MCNC: 18.8 MG/DL (ref 8–23)
CALCIUM SERPL-MCNC: 9.5 MG/DL (ref 8.8–10.2)
CHLORIDE SERPL-SCNC: 103 MMOL/L (ref 98–107)
COLOR UR AUTO: YELLOW
CREAT SERPL-MCNC: 5.79 MG/DL (ref 0.51–0.95)
DEPRECATED HCO3 PLAS-SCNC: 28 MMOL/L (ref 22–29)
DIASTOLIC BLOOD PRESSURE - MUSE: NORMAL MMHG
EOSINOPHIL # BLD MANUAL: 0.1 10E3/UL (ref 0–0.7)
EOSINOPHIL NFR BLD MANUAL: 6 %
ERYTHROCYTE [DISTWIDTH] IN BLOOD BY AUTOMATED COUNT: 17.1 % (ref 10–15)
GFR SERPL CREATININE-BSD FRML MDRD: 8 ML/MIN/1.73M2
GLUCOSE BLDC GLUCOMTR-MCNC: 141 MG/DL (ref 70–99)
GLUCOSE SERPL-MCNC: 83 MG/DL (ref 70–99)
GLUCOSE UR STRIP-MCNC: NEGATIVE MG/DL
HCT VFR BLD AUTO: 34.8 % (ref 35–47)
HGB BLD-MCNC: 10.7 G/DL (ref 11.7–15.7)
HGB UR QL STRIP: NEGATIVE
IGG SERPL-MCNC: 959 MG/DL (ref 610–1616)
INTERPRETATION ECG - MUSE: NORMAL
KETONES UR STRIP-MCNC: NEGATIVE MG/DL
LACTATE SERPL-SCNC: 0.7 MMOL/L (ref 0.7–2)
LEUKOCYTE ESTERASE UR QL STRIP: ABNORMAL
LYMPHOCYTES # BLD MANUAL: 0.5 10E3/UL (ref 0.8–5.3)
LYMPHOCYTES NFR BLD MANUAL: 23 %
MCH RBC QN AUTO: 38.4 PG (ref 26.5–33)
MCHC RBC AUTO-ENTMCNC: 30.7 G/DL (ref 31.5–36.5)
MCV RBC AUTO: 125 FL (ref 78–100)
MONOCYTES # BLD MANUAL: 0.3 10E3/UL (ref 0–1.3)
MONOCYTES NFR BLD MANUAL: 14 %
NEUTROPHILS # BLD MANUAL: 1.3 10E3/UL (ref 1.6–8.3)
NEUTROPHILS NFR BLD MANUAL: 57 %
NITRATE UR QL: NEGATIVE
P AXIS - MUSE: 50 DEGREES
PH UR STRIP: 5 [PH] (ref 5–7)
PHOSPHATE SERPL-MCNC: 3.6 MG/DL (ref 2.5–4.5)
PLAT MORPH BLD: ABNORMAL
PLATELET # BLD AUTO: 201 10E3/UL (ref 150–450)
POLYCHROMASIA BLD QL SMEAR: SLIGHT
POTASSIUM SERPL-SCNC: 5 MMOL/L (ref 3.4–5.3)
PR INTERVAL - MUSE: 132 MS
PROT SERPL-MCNC: 6.3 G/DL (ref 6.4–8.3)
QRS DURATION - MUSE: 72 MS
QT - MUSE: 366 MS
QTC - MUSE: 442 MS
R AXIS - MUSE: 9 DEGREES
RBC # BLD AUTO: 2.79 10E6/UL (ref 3.8–5.2)
RBC MORPH BLD: ABNORMAL
RBC URINE: 3 /HPF
SODIUM SERPL-SCNC: 141 MMOL/L (ref 136–145)
SP GR UR STRIP: 1.01 (ref 1–1.03)
SQUAMOUS EPITHELIAL: 3 /HPF
SYSTOLIC BLOOD PRESSURE - MUSE: NORMAL MMHG
T AXIS - MUSE: 9 DEGREES
TACROLIMUS BLD-MCNC: 12 UG/L (ref 5–15)
TACROLIMUS BLD-MCNC: 14.2 UG/L (ref 5–15)
TARGETS BLD QL SMEAR: SLIGHT
TME LAST DOSE: NORMAL H
UROBILINOGEN UR STRIP-MCNC: NORMAL MG/DL
VENTRICULAR RATE- MUSE: 88 BPM
WBC # BLD AUTO: 2.2 10E3/UL (ref 4–11)
WBC URINE: 21 /HPF

## 2023-05-18 PROCEDURE — 36415 COLL VENOUS BLD VENIPUNCTURE: CPT

## 2023-05-18 PROCEDURE — 86833 HLA CLASS II HIGH DEFIN QUAL: CPT | Performed by: NURSE PRACTITIONER

## 2023-05-18 PROCEDURE — 250N000013 HC RX MED GY IP 250 OP 250 PS 637: Performed by: STUDENT IN AN ORGANIZED HEALTH CARE EDUCATION/TRAINING PROGRAM

## 2023-05-18 PROCEDURE — 250N000013 HC RX MED GY IP 250 OP 250 PS 637

## 2023-05-18 PROCEDURE — 83993 ASSAY FOR CALPROTECTIN FECAL: CPT | Performed by: NURSE PRACTITIONER

## 2023-05-18 PROCEDURE — 85014 HEMATOCRIT: CPT

## 2023-05-18 PROCEDURE — 5A1D70Z PERFORMANCE OF URINARY FILTRATION, INTERMITTENT, LESS THAN 6 HOURS PER DAY: ICD-10-PCS | Performed by: STUDENT IN AN ORGANIZED HEALTH CARE EDUCATION/TRAINING PROGRAM

## 2023-05-18 PROCEDURE — 90937 HEMODIALYSIS REPEATED EVAL: CPT

## 2023-05-18 PROCEDURE — 82784 ASSAY IGA/IGD/IGG/IGM EACH: CPT | Performed by: STUDENT IN AN ORGANIZED HEALTH CARE EDUCATION/TRAINING PROGRAM

## 2023-05-18 PROCEDURE — 258N000003 HC RX IP 258 OP 636: Performed by: STUDENT IN AN ORGANIZED HEALTH CARE EDUCATION/TRAINING PROGRAM

## 2023-05-18 PROCEDURE — 214N000001 HC R&B CCU UMMC

## 2023-05-18 PROCEDURE — 85007 BL SMEAR W/DIFF WBC COUNT: CPT

## 2023-05-18 PROCEDURE — 86832 HLA CLASS I HIGH DEFIN QUAL: CPT | Performed by: NURSE PRACTITIONER

## 2023-05-18 PROCEDURE — 250N000012 HC RX MED GY IP 250 OP 636 PS 637

## 2023-05-18 PROCEDURE — 81003 URINALYSIS AUTO W/O SCOPE: CPT

## 2023-05-18 PROCEDURE — 83605 ASSAY OF LACTIC ACID: CPT | Performed by: HOSPITALIST

## 2023-05-18 PROCEDURE — 99233 SBSQ HOSP IP/OBS HIGH 50: CPT | Mod: GC | Performed by: HOSPITALIST

## 2023-05-18 PROCEDURE — 99222 1ST HOSP IP/OBS MODERATE 55: CPT | Mod: FS | Performed by: NURSE PRACTITIONER

## 2023-05-18 PROCEDURE — 87086 URINE CULTURE/COLONY COUNT: CPT

## 2023-05-18 PROCEDURE — 36415 COLL VENOUS BLD VENIPUNCTURE: CPT | Performed by: NURSE PRACTITIONER

## 2023-05-18 PROCEDURE — 80197 ASSAY OF TACROLIMUS: CPT

## 2023-05-18 PROCEDURE — 99222 1ST HOSP IP/OBS MODERATE 55: CPT | Performed by: STUDENT IN AN ORGANIZED HEALTH CARE EDUCATION/TRAINING PROGRAM

## 2023-05-18 PROCEDURE — 86364 TISS TRNSGLTMNASE EA IG CLAS: CPT | Performed by: STUDENT IN AN ORGANIZED HEALTH CARE EDUCATION/TRAINING PROGRAM

## 2023-05-18 PROCEDURE — 84100 ASSAY OF PHOSPHORUS: CPT | Performed by: HOSPITALIST

## 2023-05-18 PROCEDURE — 36415 COLL VENOUS BLD VENIPUNCTURE: CPT | Performed by: HOSPITALIST

## 2023-05-18 PROCEDURE — 80053 COMPREHEN METABOLIC PANEL: CPT

## 2023-05-18 PROCEDURE — 99207 PR NO BILLABLE SERVICE THIS VISIT: CPT | Performed by: NURSE PRACTITIONER

## 2023-05-18 PROCEDURE — 82962 GLUCOSE BLOOD TEST: CPT

## 2023-05-18 PROCEDURE — 82784 ASSAY IGA/IGD/IGG/IGM EACH: CPT | Performed by: NURSE PRACTITIONER

## 2023-05-18 PROCEDURE — 86352 CELL FUNCTION ASSAY W/STIM: CPT | Performed by: NURSE PRACTITIONER

## 2023-05-18 RX ORDER — LOPERAMIDE HCL 1 MG/7.5ML
2 SUSPENSION ORAL 4 TIMES DAILY
Status: DISCONTINUED | OUTPATIENT
Start: 2023-05-18 | End: 2023-05-23 | Stop reason: HOSPADM

## 2023-05-18 RX ORDER — SODIUM BICARBONATE 325 MG/1
325 TABLET ORAL
Status: DISCONTINUED | OUTPATIENT
Start: 2023-05-18 | End: 2023-05-19

## 2023-05-18 RX ORDER — DEXTROSE MONOHYDRATE 100 MG/ML
INJECTION, SOLUTION INTRAVENOUS CONTINUOUS PRN
Status: DISCONTINUED | OUTPATIENT
Start: 2023-05-18 | End: 2023-05-23 | Stop reason: HOSPADM

## 2023-05-18 RX ORDER — VANCOMYCIN HYDROCHLORIDE 50 MG/ML
125 KIT ORAL 4 TIMES DAILY
Status: DISCONTINUED | OUTPATIENT
Start: 2023-05-18 | End: 2023-05-18

## 2023-05-18 RX ORDER — LOPERAMIDE HCL 2 MG
4 CAPSULE ORAL 4 TIMES DAILY PRN
Status: CANCELLED | OUTPATIENT
Start: 2023-05-18

## 2023-05-18 RX ADMIN — SODIUM CHLORIDE 300 ML: 9 INJECTION, SOLUTION INTRAVENOUS at 13:24

## 2023-05-18 RX ADMIN — Medication 1 CAPSULE: at 08:01

## 2023-05-18 RX ADMIN — LOPERAMIDE HCL 2 MG: 1 SOLUTION ORAL at 20:10

## 2023-05-18 RX ADMIN — VANCOMYCIN HYDROCHLORIDE 125 MG: 125 CAPSULE ORAL at 08:00

## 2023-05-18 RX ADMIN — PREDNISONE 2.5 MG: 2.5 TABLET ORAL at 20:09

## 2023-05-18 RX ADMIN — Medication: at 13:26

## 2023-05-18 RX ADMIN — PREDNISONE 5 MG: 5 TABLET ORAL at 08:01

## 2023-05-18 RX ADMIN — PANCRELIPASE 3 CAPSULE: 24000; 76000; 120000 CAPSULE, DELAYED RELEASE PELLETS ORAL at 20:04

## 2023-05-18 RX ADMIN — METOPROLOL TARTRATE 25 MG: 25 TABLET, FILM COATED ORAL at 08:01

## 2023-05-18 RX ADMIN — Medication 40 MG: at 20:13

## 2023-05-18 RX ADMIN — SODIUM BICARBONATE 325 MG: 325 TABLET ORAL at 20:06

## 2023-05-18 RX ADMIN — Medication 500 MCG: at 08:01

## 2023-05-18 RX ADMIN — TACROLIMUS 3 MG: 5 CAPSULE ORAL at 08:00

## 2023-05-18 RX ADMIN — Medication 40 MG: at 08:00

## 2023-05-18 RX ADMIN — Medication 1 CAPSULE: at 20:09

## 2023-05-18 RX ADMIN — LOPERAMIDE HCL 2 MG: 1 SOLUTION ORAL at 18:46

## 2023-05-18 RX ADMIN — METOPROLOL TARTRATE 25 MG: 25 TABLET, FILM COATED ORAL at 20:09

## 2023-05-18 RX ADMIN — SODIUM CHLORIDE 250 ML: 9 INJECTION, SOLUTION INTRAVENOUS at 13:24

## 2023-05-18 RX ADMIN — TACROLIMUS 2.5 MG: 5 CAPSULE ORAL at 18:46

## 2023-05-18 ASSESSMENT — ACTIVITIES OF DAILY LIVING (ADL)
ADLS_ACUITY_SCORE: 35

## 2023-05-18 NOTE — H&P
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    Refractory C. Diff colitis  Diagnosed with C diff on 5/8, has been on PO Vanc w/o significant improvement of diarrhea. Pt noticed worsening diarrhea only when using tube feeds for the past 4-5 weeks. Otherwise she has baseline 2 lose stools per day, though possibly due to reduced PO intake. Enteric panel negative on 5/8, CMV undetected on 5/12, EBV<35.   - Continue PTA Vanco (started 5/9) 125 mg QID. Do not taper for now given refractory C. Diff colitis. Recommend discussion with transplant ID regarding Fidaxomicin if not improving.       Failure to thrive  Weight loss  Pt reports 24 lbs weight loss in past 5 weeks. Pt attributes weight loss to decrease tube feeds intolerance. Pt tolerates PO however not enough to sustain proper nutrition. B symptoms negative  - AXR to assess proper GJ tube placement  - Nutrition consulted, appreciate assistance      S/p bilateral Lung transplant 6/28/22  Persistent Left pleural effusion  Pt underwent b/l lung transplant 6/22 due to COPD c/b persistent bilateral pleural effusion. CMV 5/12 undetected, EBV <35. CXR reveals small left pleural effusion (unchanged from prior) with clear, hyperinflated lungs. WBC 2.2. Procal 3.4 but unreliable iso ESRD. CRP mildly elevated at 16. Influenza/RSV/Covid negative. VBG 7.37/53/64/31  - Tacro 3 mg qam, 2.5 mg at bedtime (goal 8-12). Adjust as  needed  - Prednisone 5 mg qam, 2.5 mg qpm  - Dapsone 25 mls (50 mg) per J tube MWF  For PJP ppx (bactrim stopped d/t hyperkalemia, Pentamidine neb not tolerated)  - Hold Azathioprine, refer further management to pulmonary team.   - Tacro level pending, was 12.7 on 5/12  - Continuous pulse ox  - Transplant pulmonary team consulted    Leukopenia  Neutropenia  WBC 2.2, was 4.2 two months ago. ANC 1.3 at admission iso current C.diff infection. Neutropenia etiology unclear with last white count 1.8 with ANC by report of 0.8 or 0.9 for which patient received Neupogen. Off azathioprine  - CBC with diff in the am    Severe gatroparesis s/p NG tube placement and EGD with botox  Pancreatic insuficiency  Pancreatic elastase 198 with slight to moderate panc insufficiency   - PPI BID  - Start Creon 1 capsule TID with meals, increase as needed  - Start probiotics daily  - AXS to assess NG tube placement    Troponinemia  Pt denies any chest pain, shortness of breath.Trop 167-->148, likely 2/2 demand ischemia iso CKD. EKG reviewed, no ST segment changes. T wave inversion noted in V4 (new from previous EKG).   - CTM    ESRD on HD T/Th/Sat  Cr 5.14 on admission was 2.96 - 8.27 in past 2-3 months.   - Nephrology consulted for HD initiation      Recent COVID infection 3/2023  Pt breathing comfortable on room air. No concern       Diet:  soft, advance as tolerated/TF  DVT Prophylaxis: Pneumatic Compression Devices  Dong Catheter: Not present  Fluids: PO  Lines: None     Cardiac Monitoring: None  Code Status:   FULL    Clinically Significant Risk Factors Present on Admission                                Disposition Plan      Expected Discharge Date: 05/19/2023                The patient's care was discussed with the Attending Physician, Dr. Caprice Echevarria.      Melissa Hu MD  Medicine Service, [facility]  Securely message with BRAIN (more info)  Text page via Actual Experience  Paging/Directory   See signed in provider for up to date coverage information  ______________________________________________________________________    Chief Complaint   Weight loss, diarrhea    History is obtained from the patient    History of Present Illness   Sofie Rodriguez is a 62 year old female with PMH of  bilateral lung transplant 6/28/2022 for COPD c/b persistent b/l pleural effusions, recurrent c diff colitis, post operative right phrenic palsy, ASCVD, EBV viremia, Hepatitic C, chronic hypercapnea, gastroparesis s/p GJ tube, ESRD on HD, GI bleed 2/2 pyloric ulcer, hemobilia s/p ERCP and MRCP who presented to the ED complaining of weight loss, diarrhea, generalized weakness.  Patient was diagnosed with C. difficile on 5/8 and was started on oral vancomycin.  Patient reports that loose stools have not improved since being on antibiotics.  She has loose stools at baseline but noticed worsening loose stools approximately 5 weeks ago.  Worsening loose stools mostly occurring when using tube feeds, which she has not been able to tolerate well in the past 5 weeks.  She also reports some nausea when using tube feeds.  But tolerating p.o. well.  He has 2 loose stools per day when not using tube feeds but that is most likely due to poor oral intake not being able to sustain proper nutrition.    Patient resting comfortably when visited the ED, no acute distress.  Patient denies any chest pain, shortness of breath, nausea vomiting, shortness of breath, palpitations, abdominal pain.  Patient lives at home with her daughter and 2 grandchildren, able to ambulate and do house chores.  Denies any recent falls.    ED course: VSS, patient afebrile.  Labs notable for troponin 167-->148, WBC 2.2, ANC 1.3, CRP 16, creatinine 5.14.  Chest x-ray significant for mild left pleural effusion.  EKG reviewed with no ST segment changes, noted for T wave inversions in lead V4 which is a new change from previous EKG. 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     Hepatitis 2017    Hep C, [facility]     HTN (hypertension)      Lung infection 11/30/2022     Osteopenia        Past Surgical History   Past Surgical History:   Procedure Laterality Date     BRONCHOSCOPY (RIGID OR FLEXIBLE), DIAGNOSTIC N/A 8/2/2022    Procedure: BRONCHOSCOPY, DIAGNOSTIC- inspection Bronch;  Surgeon: Kamala Lovell MD;  Location: [facility]     BRONCHOSCOPY (RIGID OR FLEXIBLE), DIAGNOSTIC N/A 9/13/2022    Procedure: INSPECTION BRONCHOSCOPY, WITH BRONCHOALVEOLAR LAVAGE;  Surgeon: Jose R Mccullough MD;  Location: [facility]     BRONCHOSCOPY (RIGID OR FLEXIBLE), DIAGNOSTIC N/A 11/9/2022    Procedure: BRONCHOSCOPY, WITH BRONCHOALVEOLAR LAVAGE AND BIOPSY;  Surgeon: Cesar Lima MD;  Location: [facility]     BRONCHOSCOPY (RIGID OR FLEXIBLE), DIAGNOSTIC N/A 1/25/2023    Procedure: BRONCHOSCOPY, WITH BRONCHOALVEOLAR LAVAGE AND BIOPSY;  Surgeon: Mason Reddy MD;  Location: [facility]     BRONCHOSCOPY (RIGID OR FLEXIBLE), DIAGNOSTIC N/A 4/19/2023    Procedure: BRONCHOSCOPY, WITH BRONCHOALVEOLAR LAVAGE AND BIOPSY;  Surgeon: Kamala Lovell MD;  Location: [facility]     BRONCHOSCOPY FLEXIBLE AND RIGID N/A 07/19/2022    Procedure: BRONCHOSCOPY inspection only;  Surgeon: Bob Liao MD;  [facility]     COLONOSCOPY  2015     CV CORONARY ANGIOGRAM N/A 06/30/2021    Procedure: CV CORONARY ANGIOGRAM;  Surgeon: Alexander Cuellar MD;  Location:  [facility]     CV RIGHT HEART CATH MEASUREMENTS RECORDED N/A 06/30/2021    Procedure: CV RIGHT HEART CATH;  Surgeon: Alexander Cuellar MD;  Location:  [facility]     ENDOSCOPIC RETROGRADE CHOLANGIOPANCREATOGRAM N/A 8/11/2022    Procedure: ENDOSCOPIC RETROGRADE CHOLANGIOPANCREATOGRAPHY  WITH PANCREATIC DUCT NEEDLE KNIFE AND STENT PLACEMENT, BILE DUCT SPHINCTEROTOMY, BLOOD/DEBRIS REMOVAL AND STENT PLACEMENT;  Surgeon: Cosmo Arroyo MD;  [facility]     ENDOSCOPIC RETROGRADE CHOLANGIOPANCREATOGRAM N/A 10/7/2022    Procedure: ENDOSCOPIC RETROGRADE CHOLANGIOPANCREATOGRAPHY with biliary and pancreatic stent removal, debris removal;  Surgeon: Cosmo Arroyo MD;  Location: [facility]     ENT SURGERY  1974    tonsillectomy     ENTEROSCOPY SMALL BOWEL N/A 8/11/2022    Procedure: SMALL BOWEL ENTEROSCOPY;  Surgeon: Cosmo Arroyo MD;  [facility]     ESOPHAGOGASTRODUODENOSCOPY, WITH NASOGASTRIC TUBE INSERTION N/A 07/01/2022    Procedure: ESOPHAGOGASTRODUODENOSCOPY, WITH NASOJEJUNAL TUBE INSERTION;  Surgeon: Ozzy Nickerson MD;  Location: [facility]     ESOPHAGOSCOPY, GASTROSCOPY, DUODENOSCOPY (EGD), COMBINED N/A 8/3/2022    Procedure: ESOPHAGOGASTRODUODENOSCOPY (EGD);  Surgeon: Ira Andres MD;  Location: [facility]     ESOPHAGOSCOPY, GASTROSCOPY, DUODENOSCOPY (EGD), COMBINED N/A 1/25/2023    Procedure: ESOPHAGOGASTRODUODENOSCOPY (EGD) with botox injection;  Surgeon: Gonzalez Navarro MD;  Location: [facility]     HAND SURGERY       INSERT CHEST TUBE Right 9/13/2022    Procedure: Insert chest tube;  Surgeon: Jose R Mccullough MD;  Location: [facility]     IR CVC TUNNEL PLACEMENT > 5 YRS OF AGE  9/26/2022     IR GASTRO JEJUNOSTOMY TUBE CHANGE  8/31/2022     IR GASTRO JEJUNOSTOMY TUBE CHANGE  12/21/2022     IR GASTRO JEJUNOSTOMY TUBE PLACEMENT  7/27/2022     IR THORACENTESIS  8/29/2022     LEEP TX, CERVICAL  04/07/2017    HECTOR III     LYMPH NODE BIOPSY Left 2005    Left axilla, benign- [place]     MIDLINE INSERTION - DOUBLE LUMEN Left 07/28/2022    20cm, Basilic vein     REPLACE GASTROJEJUNOSTOMY TUBE, PERCUTANEOUS  10/7/2022    Procedure: Replace Gastrojejunostomy Tube;  Surgeon: Cosmo Arroyo MD;  [facility]     THORACENTESIS Left 8/29/2022    Procedure:  THORACENTESIS;  Surgeon: Bo Capone PA-C;  Location: [facility]     THORACENTESIS Left 2022    Procedure: Thoracentesis;  Surgeon: Jose R Mccullough MD;  Location: [facility]     THROMBECTOMY UPPER EXTREMITY Left 2022    Procedure: LEFT RADIAL ARM THROMBECTOMY;  Surgeon: Christie Graham MD;  [facility]     TRANSPLANT LUNG RECIPIENT SINGLE X2 Bilateral 2022    Procedure: Clamshell Incision, Bilateral Sequential Lung Transplant, On Cardiopulmonary Bypass, Flexible Bronchoscopy;  Surgeon: Sue Sunshine MD;  Location: [facility]       Prior to Admission Medications   Prior to Admission Medications   Prescriptions Last Dose Informant Patient Reported? Taking?   Guar Gum (FIBER MODULAR, NUTRISOURCE FIBER,) packet   No No   Si packet by Per Feeding Tube route 3 times daily (with meals)   Nutritional Supplements (GLUCOSE MANAGEMENT) TABS   No No   Sig: Take 3-4 tablets by mouth as needed (hypoglycemia) Pharmacist may change instructions to fit whatever glucose tab product they have in stock.   Sharps Container MISC   No No   Si sharps container   alcohol swab prep pads   No No   Sig: Use to swab area of injection/amos as directed.   azaTHIOprine (IMURAN) 5 mg/mL SUSP   No No   Si mLs (100 mg) by Per J Tube route daily ON HOLD 2023 FOR LOW WBC   blood glucose (CONTOUR NEXT TEST) test strip   No No   Sig: Use to test blood sugar 4 times daily, as directed. Okay to use whatever brand insurance will cover.   blood glucose (NO BRAND SPECIFIED) lancets standard   No No   Sig: Use to test blood sugar 4 times daily as directed. Okay to use whatever brand insurance will cover.   blood glucose monitoring (CONTOUR NEXT MONITOR W/DEVICE KIT) meter device kit   No No   Sig: Use to test blood sugar 4 times daily or as directed.   calcium carbonate 600 mg-vitamin D 400 units (CALTRATE) 600-400 MG-UNIT per tablet   No No   Si tablet by Per J Tube route 2 times daily (with meals)    cyanocobalamin (CYANOCOBALAMIN) 500 MCG tablet   No No   Si tablet (500 mcg) by Per Feeding Tube route daily   dapsone 2 mg/mL SUSP   No No   Si mLs (50 mg) by Per J Tube route Every Mon, Wed, Fri Morning   filgrastim-sndz (ZARXIO) 300 MCG/0.5ML syringe   No No   Sig: Inject 0.5 mLs (300 mcg) Subcutaneous daily for 1 day   insulin pen needle (32G X 4 MM) 32G X 4 MM miscellaneous   No No   Sig: Use 4 pen needles daily or as directed.   loperamide (IMODIUM A-D) 2 MG tablet   Yes No   Si tablet (2 mg) by Per J Tube route 4 times daily as needed for diarrhea   metoprolol tartrate (LOPRESSOR) 50 MG tablet   No No   Si.5 tablets (25 mg) by Per Feeding Tube route 2 times daily   pantoprazole (PROTONIX) 2 mg/mL SUSP suspension   No No   Si mLs (40 mg) by Per J Tube route 2 times daily   predniSONE (DELTASONE) 5 MG tablet   No No   Sig: Take 1 tab (5mg) at AM and 1/2 tab (2.5) in pm   protein modular (PROSOURCE TF) LIQD   No No   Si packet by Per Feeding Tube route daily   tacrolimus (GENERIC EQUIVALENT) 1 mg/mL suspension   No No   Sig: Take 3 mLs (3 mg) by mouth every morning AND 2.5 mLs (2.5 mg) every evening. Total dose: 3mg in AM and 2.5mg in PM   vancomycin (FIRVANQ) 50 MG/ML oral solution   No No   Sig: Take 2.5 mLs (125 mg) by mouth 4 times daily for 10 days, THEN 2.5 mLs (125 mg) 2 times daily for 7 days, THEN 2.5 mLs (125 mg) daily for 7 days, THEN 2.5 mLs (125 mg) every other day for 14 days.      Facility-Administered Medications: None        Physical Exam   Vital Signs: Temp: 98.4  F (36.9  C) Temp src: Oral BP: 109/62 Pulse: 88   Resp: 16 SpO2: 96 % O2 Device: None (Room air)    Weight: 0 lbs 0 oz    Gen: NAD, alert, cooperative, non-toxic, pleasant  HEENT: EOMI, no conjunctival icterus, tracking appropriately  Resp: CTAB, no crackles or wheezes, no increased WOB  Cardiac: RRR, no S3/S4, no M/R/G appreciated  GI: soft, non-tender, non-distended, NG tube intact without  secretions  Ext: WWP, no edema, no erythema  Neuro: AOx3, sensation intact b/l  Psych: appropriate mood & affect      Medical Decision Making       Please see A&P for additional details of medical decision making.      Data     I have personally reviewed the following data over the past 24 hrs:    2.2 (L)  \   11.7   / 215     142 103 18.0 /  161 (H)   3.9 28 5.14 (H) \       ALT: 12 AST: 27 AP: 54 TBILI: 0.6   ALB: 4.3 TOT PROTEIN: 7.1 LIPASE: 20       Trop: 148 (HH) BNP: N/A       Procal: 0.34 (H) CRP: 16.00 (H) Lactic Acid: 1.2       INR:  0.98 PTT:  N/A   D-dimer:  N/A Fibrinogen:  N/A

## 2023-05-18 NOTE — UTILIZATION REVIEW
Admission Status; Secondary Review Determination    Under the authority of the Utilization Management Committee, the utilization review process indicated a secondary review on the above patient. The review outcome is based on review of the medical records, discussions with staff, and applying clinical experience noted on the date of the review.    (x) Inpatient Status Appropriate - This patient's medical care is consistent with medical management for inpatient care and reasonable inpatient medical practice.    RATIONALE FOR DETERMINATION: Complex 62-year-old female with history of bilateral lung transplant June 2022 for COPD complicated by persistent bilateral pleural effusions, recurrent C. difficile colitis, postoperative right phrenic palsy, chronic hypercapnia, gastroparesis status post GJ tube, end-stage renal disease on hemodialysis now presenting with refractory diarrhea felt to be secondary to C. difficile colitis, possible malnutrition with significant 20+ pound weight loss during the last 5 weeks associate with progressive weakness.  Patient will need gentle ultrafiltration as estimated dry weight probably reduced due to significant recent weight loss and persistent left pleural effusion.  Patient will need ongoing C. difficile colitis treatment as well as possible tube feed changes to optimize nutrition in the face of diarrhea and weight loss.  Is expected require greater than 2 nights in the hospital appropriate for inpatient care.    At the time of admission with the information available to the attending physician more than 2 nights Hospital complex care was anticipated, based on patient risk of adverse outcome if treated as outpatient and complex care required. Inpatient admission is appropriate based on the Medicare guidelines.    This document was produced using voice recognition software    The information on this document is developed by the utilization review team in order for the business  office to ensure compliance. This only denotes the appropriateness of proper admission status and does not reflect the quality of care rendered.    The definitions of Inpatient Status and Observation Status used in making the determination above are those provided in the CMS Coverage Manual, Chapter 1 and Chapter 6, section 70.4.    Sincerely,    Sha Olivarez MD  Utilization Review  Physician Advisor  [facility].

## 2023-05-18 NOTE — PROGRESS NOTES
[facility]    Medicine Progress Note - Medicine Service, [facility] TEAM 5    Resident/Fellow Attestation   I, Gerardo Cuba MD, was present with the medical/EMILY student who participated in the service and in the documentation of the note.  I have verified the history and personally performed the physical exam and medical decision making.  I agree with the assessment and plan of care as documented in the note.      My findings are incorporated into the note below.     Gerardo Cuba MD  PGY3  Date of Service (when I saw the patient): 05/18/23      Date of Admission:  5/17/2023    Assessment & Plan    Sofie Rodriguez is a 62 year old female with PMH of  bilateral lung transplant 6/28/2022 for COPD c/b persistent b/l pleural effusions, post operative right phrenic palsy, ASCVD, EBV viremia,, chronic hypercapnea, gastroparesis s/p GJ tube, ESRD on HD, GI bleed 2/2 pyloric ulcer, hemobilia s/p ERCP admitted on 5/17/2023 with persistent diarrhea, weight loss, tube feed intolerance suspected osmotic diarrhea 2/2 malabsorption.     Diarrhea, osmotic   C. Diff colitis, improved   Severe gatroparesis s/p NG tube placement and EGD with botox  Feeding intolerance   Weight loss  Pt reports 5 weeks of diarrhea accompanied with 24 lb weight loss. Outpatient workup showed negative enteric panel, parasites, giardia CMV undetected on 5/12, EBV <35. Found to be c diff positive on 5/8 and initiated on vancomycin. Pt reports some symptomatic improvement but persistent diarrhrea, nausea, and lower abdominal discomfort associated with tube feeds only. No such symptoms with po intake. No improvement with recent formula change. AXR shows proper placement of GJ tube. Per GI, suspect osmotic diarrhea 2/2 malabsorption in context of gastroparesis.   - GI consulted, appreciate recommendations:     Fecal osmolarity, electrolytes    Discontinue oral vancomycin given no improvement with  medication, and she has complete the treatment. could be from colonization with only PCR was checked (not toxin).    Continue loperamide, can give up to 16 g a day.  - Nutrition consulted, appreciate recommendations    Recommend 2 capsules Creon 24 at each meal to cover oral intakes (provides 745 units lipase/kg/meal)    Tube feeding: [employer] Renal 1.8 x 4 cartons/day (cycled over about 12 hours) provides 1000 ml formula, 1800 calories, 80 g protein, 16 g fiber, 88 g fat, 1000 mg potassium, and 760 mg phosphorus. Run TF at 85 ml/hr x 12 hours via J port overnight.     RELIZORB CARTRIDGES    Pancreatic insufficiency  Pt found to have pancreatic elastase pf 198 with slight to moderate panc insufficiency.    - Nutrition consulted, appreciate recommendations:     Recommend 2 capsules Creon 24 at each meal to cover oral intakes (provides 745 units lipase/kg/meal)  - PPI BID  - Lactobacillus daily    S/p bilateral Lung transplant 6/28/22  Persistent Left pleural effusion  Pt underwent b/l lung transplant 6/22 due to COPD c/b persistent bilateral pleural effusion. CMV 5/12 undetected, EBV <35. CXR reveals small left pleural effusion (unchanged from prior) with clear, hyperinflated lungs. WBC 2.2. Procal 3.4 but unreliable iso ESRD. CRP mildly elevated at 16. Influenza/RSV/Covid negative. VBG 7.37/53/64/31  - Pulm transplant consulted appreciate recs:  - Tacrolimus 3 mg qAM / 2.5 mg qPM.    - Imuran on hold since 5/8 for leukopenia  - Prednisone 5 mg qAM / 2.5 mg qPM  - Dapsone qMWF for PJP ppx  - CMV ppx not currently indicated, D+/R+, recent CMV (5/12) negative  - Continuous pulse ox  - Transplant pulmonary team consulted    Leukopenia  Neutropenia  WBC 2.2, was 4.2 two months ago. ANC 1.3 at admission iso current C.diff infection. Neutropenia etiology unclear with last white count 1.8 with ANC by report of 0.8 or 0.9 for which patient received Neupogen. Off azathioprine  - Transplant pulm consulted     ESRD on HD  T/Th/Sat  Cr 5.14 on admission was 2.96 - 8.27 in past 2-3 months. On HD.   - Transplant nephrology consulted   - Dialysis today      Troponinemia  Pt denies any chest pain, shortness of breath.Trop 167-->148, likely 2/2 demand ischemia iso CKD. EKG reviewed, no ST segment changes. T wave inversion noted in V4 (new from previous EKG).   - CTM         Diet: Advance Diet as Tolerated: Regular Diet Adult; Thin Liquids (level 0)  Adult Formula Drip Feeding: Continuous Other; [employer] Renal 1.8; Jejunostomy; Goal Rate: 85 ml/hr x about 12 hours overnight (1000 mL daily); mL/hr; From: 8:00 PM; To: 8:00 AM; Please follow 4 hour hang time due to open system and refer to Creo...    DVT Prophylaxis: Pneumatic Compression Devices  Dong Catheter: Not present  Lines: PRESENT             Cardiac Monitoring: None  Code Status: Full Code      Clinically Significant Risk Factors Present on Admission                                Disposition Plan     Expected Discharge Date: 05/19/2023                The patient's care was discussed with the Attending Physician, Dr. Pantoja.    Aracely Lobato  Medicine Service, [facility] TEAM [address] [place] [address]  Securely message with [employer] (more info)  Text page via [facility] Paging/Directory   See signed in provider for up to date coverage information  ______________________________________________________________________    Interval History   Pt says she has been having increased diarrhea and intolerance to her tube feeds for the past 5 weeks resulting in 24 lb weight loss in that time.She started taking vancomycin 10 days ago for c diff, and feels like there has been some improvement in her diarrhea, however, she states that she still has significant diarrhea with tube feeds, along with nausea and lower abdominal discomfort. She also eats small amount of foods orally and does not have these symptoms with oral intake. Says she had been having diarrhea since  her lung transplant last year, but had not issues with tolerating tube feeds until 5 weeks ago. Had her formula switched recently, but this did not help with her symptoms. Denies vomiting, or blood in stools. Denies abdominal pain. Denies fever, chills, SOB, night sweats.     Physical Exam   Vital Signs: Temp: 98.8  F (37.1  C) Temp src: Oral BP: 139/85 Pulse: 91   Resp: 12 SpO2: 99 % O2 Device: None (Room air) Oxygen Delivery: 2 LPM  Weight: 0 lbs 0 oz   Constitutional: awake, alert, cooperative, no apparent distress, and appears stated age  HENT:      Head: Normocephalic and atraumatic.      Right Ear: External ear normal.  Eyes: EOM intact, no icterus  Respiratory: No increased work of breathing, good air exchange, clear to auscultation bilaterally  Cardiovascular: RRR  GI: No scars, normal bowel sounds, soft, non-distended, non-tender, no masses palpated, no hepatosplenomegally. GJ tube in place       Data     I have personally reviewed the following data over the past 24 hrs:    Most Recent 3 CBC's:Recent Labs   Lab Test 05/18/23  0554 05/17/23  1657 02/28/23  1304   WBC 2.2* 2.2* 4.2   HGB 10.7* 11.7 10.5*   * 124* 110*    215 245     Most Recent 3 BMP's:Recent Labs   Lab Test 05/18/23  0554 05/17/23  1657 05/12/23  0811 03/06/23  1540 02/28/23  1304    142  --   --  137   POTASSIUM 5.0 3.9  --   --  4.4   CHLORIDE 103 103 98   < > 96*   CO2 28 28  --   --  29   BUN 18.8 18.0  --   --  25.8*   CR 5.79* 5.14*  --   --  2.96*   ANIONGAP 10 11  --   --  12   ESTUARDO 9.5 9.7  --   --  9.6   GLC 83 161*  --   --  117*    < > = values in this interval not displayed.     Most Recent 2 LFT's:Recent Labs   Lab Test 05/18/23  0554 05/17/23  1657   AST 26 27   ALT 9* 12   ALKPHOS 48 54   BILITOTAL 0.6 0.6     Most Recent 3 Creatinines:Recent Labs   Lab Test 05/18/23  0554 05/17/23  1657 02/28/23  1304   CR 5.79* 5.14* 2.96*     Most Recent 3 Hemoglobins:Recent Labs   Lab Test 05/18/23  0554 05/17/23  1657  02/28/23  1304   HGB 10.7* 11.7 10.5*     Most Recent 6 Bacteria Isolates From Any Culture (See EPIC Reports for Culture Details):Recent Labs   Lab Test 06/14/21  0939   CULT Culture negative for acid fast bacilli  Assayed at [facility] [address] [place] [id number] [phone]  >10 Squamous epithelial cells/low power field indicates oral contamination. Please   recollect.  *  >10 Squamous epithelial cells/low power field indicates oral contamination. Please   recollect.  *     Most Recent Urinalysis:Recent Labs   Lab Test 05/18/23  0627   COLOR Yellow   APPEARANCE Slightly Cloudy*   URINEGLC Negative   URINEBILI Negative   URINEKETONE Negative   SG 1.011   UBLD Negative   URINEPH 5.0   PROTEIN 30*   NITRITE Negative   LEUKEST Moderate*   RBCU 3*   WBCU 21*     Most Recent ESR & CRP:Recent Labs   Lab Test 05/17/23  1657   SED 24   CRPI 16.00*             Imaging results reviewed over the past 24 hrs:   Recent Results (from the past 24 hour(s))   XR Chest 2 Views    Narrative    EXAM: XR CHEST 2 VIEWS  5/17/2023 5:20 PM     HISTORY:  dyspnea, s/p lung transplant       COMPARISON:  Chest radiograph 2/28/2023    FINDINGS:   PA and lateral views of the upper chest. Right central venous  dual-lumen central venous catheter with tip projecting over the high  right atrium. Postoperative changes of lung transplantation with  clamshell sternotomy wires. Calcifications of the aortic knob. Trachea  is mildly deviated to the right. Unchanged left costophrenic angle  blunting. Right costophrenic angle is clear. No pneumothorax. . No  focal consolidative opacity Partially visualized upper abdomen is  unremarkable. No acute osseous abnormality.      Impression    IMPRESSION:   Postoperative changes of prior bilateral lung transplantation.  Unchanged left pleural effusion/atelectasis. No acute airspace opacity    I have personally reviewed the examination and initial interpretation  and I agree with the  findings.    NIKOS JAVED MD         SYSTEM ID:  [id number]   XR Abdomen Port 1 View    Narrative    Exam: XR ABDOMEN PORT 1 VIEW, 5/17/2023 10:14 PM    Indication: Pt with active C. diff. Please assess GJ tube placement    Comparison: 11/23/2022    Findings:   Single portable AP supine radiograph of the abdomen. Percutaneous  gastrojejunostomy tube in place with the balloon inflated in the left  lower quadrant in similar position as comparison radiograph and likely  projecting over the stomach with distal end of the tube tip projecting  over the expected location of the jejunum. Partially visualized  central venous catheter tip projects over the right atrium. Clamshell  sternotomy wires.    No obstructive bowel gas pattern. No pneumatosis or portal venous gas.  Lung bases are clear. No acute osseous abnormality.      Impression    Impression:  Percutaneous GJ balloon projects over the left lower  quadrant in expected location of the stomach, distal end of the tube  tip projects over the expected location of the jejunum.    I have personally reviewed the examination and initial interpretation  and I agree with the findings.    NIKOS JAVED MD         SYSTEM ID:  [id number]

## 2023-05-18 NOTE — PLAN OF CARE
Goal Outcome Evaluation:      Plan of Care Reviewed With: patient    Overall Patient Progress: no changeOverall Patient Progress: no change    Outcome Evaluation: Plan to begin on oral + TF pancreatic enzymes d/t low fecal elastase and diarrhea making adequate nutrition an issue. Discussed w/ pt and providers. See RD note 5/18 for details.    Christie Deras RDN, LD  6D/ED RD pager: [phone]  Weekend/Holiday RD pager: [phone]

## 2023-05-18 NOTE — PROGRESS NOTES
Addendum - patient's insurance will not cover Relizorb so adjusting to mixing enzymes with TF    CLINICAL NUTRITION SERVICES - ASSESSMENT NOTE     Nutrition Prescription    RECOMMENDATIONS FOR MDs/PROVIDERS TO ORDER:  None currently    Malnutrition Status:    Moderate malnutrition in the context of acute illness    Recommendations already ordered by Registered Dietitian (RD):    Creon 3 is a pediatric dose, not adequate for an adult. Recommend 2 capsules Creon 24 at each meal to cover oral intakes (provides 745 units lipase/kg/meal)    Tube feeding: "[employer]" Renal 1.8 x 4 cartons/day (cycled over about 12 hours) provides 1000 ml formula, 1800 calories, 80 g protein, 16 g fiber, 88 g fat, 1000 mg potassium, and 760 mg phosphorus. Run TF at 85 ml/hr x 12 hours via J port overnight.   Once begin TFs, begin the following pancreatic enzyme regimen and recommend order each of the following:   A) Sodium Bicarb tablet (325 mg), 1 tablet q 4 hrs via Jtube. Administration Instructions: Crush 1 tablet and mix into 15 ml of warm water and use this solution to mix with Creon pancreatic enzymes. DO NOT administer directly into Jtube (to be mixed into TF formula with Creon enzyme - see Creon enzyme order)   B) Creon 24, 3 capsules q 4 hrs via Jtube while TF is running x 12 hours overnight. Administration Instructions:   Open capsule and empty contents into 15 ml sodium bicarb solution (see sodium bicarb order), let dissolve for about 20-30 minutes and then add this solution to the amount of TF formula hung in TF bag every 4 hrs (i.e., once TF @ goal infusion 85 ml/hr x 12 hours will mix 3 capsules into 340 ml of TF formula every 4 hrs).     *Note: this enzyme regimen with TF @ goal infusion will provide approx 2455 units of lipase/gram of total Fat daily and approp dosing initially for pancreatic insufficiency with more elemental TF formula.       Future/Additional Recommendations:    Room to increase enzyme dosing if no  improvement in symptoms.    Insurance does not cover Relizorb at discharge so recommend maximum 8 hour hang time in home setting so at discharge. Schedule: mix about 340 mL TF formula with 3 capsules Creon (mixed with sodium bicarbonate as per directions above) and run for first 4 hours then mix 660 mL TF formula with 6 capsules Creon (mixed with sodium bicarbonate as per directions above) and run for remaining 8 hours.      REASON FOR ASSESSMENT  Sofie Rodriguez is a/an 62 year old female assessed by the dietitian for Provider Order - Registered Dietitian to Assess and Order TF per Medical Nutrition Therapy Protocol    Patient admitted for C diff colitis, weight loss, failure to thrive.     MEDICAL HISTORY   bilateral lung transplant 6/28/2022 for COPD c/b persistent b/l pleural effusions, recurrent c diff colitis, post operative right phrenic palsy, ASCVD, EBV viremia, Hepatitic C, chronic hypercapnea, gastroparesis s/p GJ tube, ESRD on HD, GI bleed 2/2 pyloric ulcer, hemobilia s/p ERCP and MRCP     NUTRITION HISTORY  At admission patient reported weight loss d/t decreased TF tolerance. Pt takes PO but intakes inadequate to sustain weight with PO alone. Pt with refractory c diff colitis; reported diarrhea worsening with using tube feeds the past 4-5 weeks. Pt often unable to get in full TF d/t worsening diarrhea. Last TF infusion was 5/16/23.    Noted patient was in contact with outpatient RD. 5/02 RD note states they adjusted Sofie's TF formula about 2 weeks earlier to have her on [employer] Renal 1.8 x 4 cartons/day (cycled over 12 hours)- 1000 ml formula, 1800 calories, 80 g protein, 16 g fiber, 88 g fat, 1000 mg potassium, and 760 mg phosphorus). Pt was also on Nut[employer]ource Fiber pkts TID (doing for multiple months) which provides 9 g fiber/day + 16 g fiber in formula = 24 g fiber/day (within recommended range for an adult).   PO intake: boiled egg one day, 1/4 ham s/w another day, few bites ramen another day.  "Drinks water, very seldom small amt of milk. Very rare sugar free candy.  Per outpatient RD \"Unsure if switching formulas would provide relief. She has been on a handful of different formulas in the past with similar sx. Could consider a [employer] option down the road\".     Patient confirmed nutrition history at visit today.     CURRENT NUTRITION ORDERS  Diet: Regular and L2 Mildly Thick Liquids    Intake/Tolerance: No documented intakes to assess.    LABS    5/08/23 Pancreatic elastase-1 198 (indicating mild to moderate exocrine pancreatic insufficiency)    MEDICATIONS    Vitamin B12 500 mcg via feeding tube daily    Nutrisource fiber 1 pkt TID    Lactobacillus rhamnosus    Creon 3 1 capsule TID with meals     protonix    Prednisone    Tacrolimus     Vancomycin    Imodium PRN    ANTHROPOMETRICS  Height: 157.5 cm (5' 2\")  Most Recent Weight:      IBW: 50 kg  BMI Based on most recent weight: 25.9 kg/m2; BMI Category: Normal BMI  Weight History: No significant weight loss noted. Need updated weight this admission, ordered.   Wt Readings from Last 15 Encounters:   03/01/23 64.4 kg (142 lb)   01/25/23 62.1 kg (136 lb 14.5 oz)   12/21/22 64.3 kg (141 lb 12.8 oz)   10/26/22 66.5 kg (146 lb 8 oz)   10/08/22 68 kg (150 lb)   09/01/22 70.3 kg (155 lb)   08/29/22 70.3 kg (155 lb)   08/24/22 70.3 kg (155 lb)   08/18/22 69.9 kg (154 lb)   08/16/22 70.5 kg (155 lb 6.4 oz)   04/29/22 63.5 kg (140 lb)   11/11/21 62.6 kg (138 lb)   06/30/21 67.4 kg (148 lb 11.2 oz)   06/30/21 61.2 kg (135 lb)   06/15/21 61.2 kg (135 lb)         ASSESSED NUTRITION NEEDS  Dosing Weight: 64.4 kg (Actual BW)   Estimated Energy Needs: 1664-0150 kcals/day (25 - 30 kcals/kg)  Justification: Increased needs  Estimated Protein Needs: 77-97 grams protein/day (1.2 - 1.5 grams of pro/kg)  Justification: Maintenance  Estimated Fluid Needs: UOP + 500-1000 mL/day  Justification: ESRD on HD    PHYSICAL FINDINGS  See malnutrition section " below.    MALNUTRITION  % Intake: </=75% for >/= 1 month (severe)  % Weight Loss: Unable to assess  -- need updated weight  Subcutaneous Fat Loss: Facial region:  mild, Upper arm:  mild and Lower arm:  mild   Muscle Loss: Temporal:  mild, Thoracic region (clavicle, acromium bone, deltoid, trapezius, pectoral):  mild, Lower arm  (forearm):  mild and Posterior calf:  mild  Fluid Accumulation/Edema: None noted  Malnutrition Diagnosis: Moderate malnutrition in the context of acute illness    NUTRITION DIAGNOSIS  Inadequate energy intake related to worsening diarrhea as evidenced by inadequate TF infusions, inadequate PO, patient report, fat and muscle loss.       INTERVENTIONS  Implementation  Nutrition Education: will be provided if nutrition education needs arise.    Collaboration with other providers  Enteral Nutrition - Initiate  Feeding tube flush  Nutrition-related medication management     Goals  Total avg nutritional intake to meet a minimum of 25 kcal/kg and 1.0 g PRO/kg daily (per dosing wt 64.4 kg).        Monitoring/Evaluation  Progress toward goals will be monitored and evaluated per protocol.    Christie Deras RDN, MAGDA  6D/ED RD pager: [phone]  Weekend/Holiday RD pager: [phone]

## 2023-05-18 NOTE — PROGRESS NOTES
HEMODIALYSIS TREATMENT NOTE    Date: 5/18/2023  Time: 4:05 PM    Data:    Weight change: 1 kg  Ultrafiltration - Post Run Net Total Removed (mL): 1000 mL  Vascular Access Status: patent  Dialyzer Rinse: Streaked  Total Blood Volume Processed: 68.3 L Liters  Total Dialysis (Treatment) Time: 3 Hours  K+ 2, Ca ++2.5  No heparin    Lab:   Yes    Interventions:  R CVC dressing changed, site care done,  lumens aspirated and flush well, 400 blood flow rate achieved and maintained, stable blood pressures throughout, vital signs monitoring every 15 min and PRN, crit line used for fluid volume monitoring, Per Pema NP, goal increased to 1 KG, tolerated well, Lunch tray offered, ate 50 % of meal, rinsed back successfully, lines flushed and locked with saline, see HD flow sheet for all data, report given to primary RN post dialysis.    Assessment:  Alert and oriented x 4, calm and cooperative, denies pain or discomfort, CVC to right chest intact, stable for HD.     Plan:    Per renal team

## 2023-05-18 NOTE — CONSULTS
Nephrology Initial Consult  May 18, 2023      Sofie Rodriguez MRN:[id number] YOB: 1960  Date of Admission:5/17/2023  Primary care provider: Vinny Berrios  Requesting physician: Caprice Echevarria MD    ASSESSMENT AND RECOMMENDATIONS:   Sofie Rodriguez is a 62 year old female with PMH of COPD s/p b/l lung transplant (6/2022) c/b persistent b/l pleural effusions, recurrent c diff colitis, post operative right phrenic palsy, ASCVD, EBV viremia, Hepatitic C, chronic hypercapnea, gastroparesis s/p GJ tube, ESRD on HD, GI bleed 2/2 pyloric ulcer, hemobilia s/p ERCP and MRCP, admitted with C diff, weight loss, and failure to thrive.    # ESKD: dialyzes TTS at [facility] with Dr. Fairbanks. Access: TDC, had LUE AVF placed 2/17/23, transposition surgery scheduled 6/9/23). EDW 56.1 kg (though recent wt of 54.2 kg). Run time: 3 hrs  - Dialysis today per TTS schedule    # BP/volume: has been losing wt, EDW 54.2 kg most recently; normotensive on metoprolol 25 mg bid; ongoing c-diff diarrheal losses, poor PO intake  - will attempt gentle UF given e/o overload with persistent L pleural effusion      # Anemia of CKD: hgb 10's; on mirera 50 mcg [id number], has just completed iron loading  - no indication for JOCELINE, hgb > 10    # BMD: Ca 9.5, alb 3.7, phos 3.6, recent   - on regular diet    Recommendations were communicated to primary team via this note    Seen and discussed with Dr. Macario Haider, PA   Division of Renal Disease and Hypertension  [phone]      REASON FOR CONSULT: ESKD/dialysis    HISTORY OF PRESENT ILLNESS:  Sofie Rodriguez is a 62 year old female with PMH of COPD s/p b/l lung transplant (6/2022) c/b persistent b/l pleural effusions, recurrent c diff colitis, post operative right phrenic palsy, ASCVD, EBV viremia, Hepatitic C, chronic hypercapnea, gastroparesis s/p GJ tube, ESRD on HD, GI bleed 2/2 pyloric ulcer, hemobilia s/p ERCP and MRCP, admitted with C diff, weight loss,  and failure to thrive. She is seen on dialysis. Pt reports she was on tube feeds until 5 weeks ago when they were stopped as she was not tolerating and having severe abd pain, nausea, diarrhea due to her gastroparesis. They are considering a gastric stimulator. 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     Hepatitis 2017    Hep C, [facility]     HTN (hypertension)      Lung infection 11/30/2022     Osteopenia        Past Surgical History:   Procedure Laterality Date     BRONCHOSCOPY (RIGID OR FLEXIBLE), DIAGNOSTIC N/A 8/2/2022    Procedure: BRONCHOSCOPY, DIAGNOSTIC- inspection Bronch;  Surgeon: Kamala Lovell MD;  Location: [facility]     BRONCHOSCOPY (RIGID OR FLEXIBLE), DIAGNOSTIC N/A 9/13/2022    Procedure: INSPECTION BRONCHOSCOPY, WITH BRONCHOALVEOLAR LAVAGE;  Surgeon: Jose R Mccullough MD;  Location: [facility]     BRONCHOSCOPY (RIGID OR FLEXIBLE), DIAGNOSTIC N/A 11/9/2022    Procedure: BRONCHOSCOPY, WITH BRONCHOALVEOLAR LAVAGE AND BIOPSY;  Surgeon: Cesar Lima MD;  Location: [facility]     BRONCHOSCOPY (RIGID OR FLEXIBLE), DIAGNOSTIC N/A 1/25/2023    Procedure: BRONCHOSCOPY, WITH BRONCHOALVEOLAR LAVAGE AND BIOPSY;  Surgeon: Mason Reddy MD;  Location: [facility]     BRONCHOSCOPY (RIGID OR FLEXIBLE), DIAGNOSTIC N/A 4/19/2023    Procedure: BRONCHOSCOPY, WITH BRONCHOALVEOLAR LAVAGE AND BIOPSY;  Surgeon: Kamala Lovell MD;  Location: [facility]     BRONCHOSCOPY FLEXIBLE AND RIGID N/A 07/19/2022    Procedure: BRONCHOSCOPY inspection only;  Surgeon: Bob Liao MD;  Location: [facility]     COLONOSCOPY  2015     CV CORONARY ANGIOGRAM N/A 06/30/2021    Procedure: CV  CORONARY ANGIOGRAM;  Surgeon: Alexander Cuellar MD;  Location:  [facility]ATH [facility]     CV RIGHT HEART CATH MEASUREMENTS RECORDED N/A 06/30/2021    Procedure: CV RIGHT HEART CATH;  Surgeon: Alexander Cuellar MD;  Location:  [facility]ATH [facility]     ENDOSCOPIC RETROGRADE CHOLANGIOPANCREATOGRAM N/A 8/11/2022    Procedure: ENDOSCOPIC RETROGRADE CHOLANGIOPANCREATOGRAPHY WITH PANCREATIC DUCT NEEDLE KNIFE AND STENT PLACEMENT, BILE DUCT SPHINCTEROTOMY, BLOOD/DEBRIS REMOVAL AND STENT PLACEMENT;  Surgeon: Cosmo Arroyo MD;  [facility]     ENDOSCOPIC RETROGRADE CHOLANGIOPANCREATOGRAM N/A 10/7/2022    Procedure: ENDOSCOPIC RETROGRADE CHOLANGIOPANCREATOGRAPHY with biliary and pancreatic stent removal, debris removal;  Surgeon: Cosmo Arroyo MD;  Location:  OR     ENT SURGERY  1974    tonsillectomy     ENTEROSCOPY SMALL BOWEL N/A 8/11/2022    Procedure: SMALL BOWEL ENTEROSCOPY;  Surgeon: Cosmo Arroyo MD;  Location:  OR     ESOPHAGOGASTRODUODENOSCOPY, WITH NASOGASTRIC TUBE INSERTION N/A 07/01/2022    Procedure: ESOPHAGOGASTRODUODENOSCOPY, WITH NASOJEJUNAL TUBE INSERTION;  Surgeon: Ozzy Nickerson MD;  Location:  GI     ESOPHAGOSCOPY, GASTROSCOPY, DUODENOSCOPY (EGD), COMBINED N/A 8/3/2022    Procedure: ESOPHAGOGASTRODUODENOSCOPY (EGD);  Surgeon: rIa Andres MD;  Location:  GI     ESOPHAGOSCOPY, GASTROSCOPY, DUODENOSCOPY (EGD), COMBINED N/A 1/25/2023    Procedure: ESOPHAGOGASTRODUODENOSCOPY (EGD) with botox injection;  Surgeon: Gonzalez Navarro MD;  Location:  GI     HAND SURGERY       INSERT CHEST TUBE Right 9/13/2022    Procedure: Insert chest tube;  Surgeon: Jose R Mccullough MD;  Location:  GI     IR CVC TUNNEL PLACEMENT > 5 YRS OF AGE  9/26/2022     IR GASTRO JEJUNOSTOMY TUBE CHANGE  8/31/2022     IR GASTRO JEJUNOSTOMY TUBE CHANGE  12/21/2022     IR GASTRO JEJUNOSTOMY TUBE PLACEMENT  7/27/2022     IR THORACENTESIS  8/29/2022     LEEP TX, CERVICAL   04/07/2017    HECTOR III     LYMPH NODE BIOPSY Left 2005    Left axilla, benign- [facility]     MIDLINE INSERTION - DOUBLE LUMEN Left 07/28/2022    20cm, Basilic vein     REPLACE GASTROJEJUNOSTOMY TUBE, PERCUTANEOUS  10/7/2022    Procedure: Replace Gastrojejunostomy Tube;  Surgeon: Cosmo Arroyo MD;  [facility]     THORACENTESIS Left 8/29/2022    Procedure: THORACENTESIS;  Surgeon: Bo Capone PA-C;  Location: [facility] OR     THORACENTESIS Left 9/13/2022    Procedure: Thoracentesis;  Surgeon: Jose R Mccullough MD;  Location: [facility]     THROMBECTOMY UPPER EXTREMITY Left 07/02/2022    Procedure: LEFT RADIAL ARM THROMBECTOMY;  Surgeon: Christie Graham MD;  [facility]: [facility]     TRANSPLANT LUNG RECIPIENT SINGLE X2 Bilateral 06/28/2022    Procedure: Clamshell Incision, Bilateral Sequential Lung Transplant, On Cardiopulmonary Bypass, Flexible Bronchoscopy;  Surgeon: Sue Sunshine MD;  Location: [facility]        MEDICATIONS:  PTA Meds  Prior to Admission medications    Medication Sig Last Dose Taking? Auth Provider Long Term End Date   alcohol swab prep pads Use to swab area of injection/amos as directed.   Susan Delacruz MD     azaTHIOprine (IMURAN) 5 mg/mL SUSP 20 mLs (100 mg) by Per J Tube route daily ON HOLD 5/8/2023 FOR LOW WBC   Kaylin Triana PA-C Yes    blood glucose (CONTOUR NEXT TEST) test strip Use to test blood sugar 4 times daily, as directed. Okay to use whatever brand insurance will cover.   Kaylin Triana PA-C     blood glucose (NO BRAND SPECIFIED) lancets standard Use to test blood sugar 4 times daily as directed. Okay to use whatever brand insurance will cover.   Kaylin Triana PA-C     blood glucose monitoring (CONTOUR NEXT MONITOR W/DEVICE KIT) meter device kit Use to test blood sugar 4 times daily or as directed.   Susan Delacruz MD     calcium carbonate 600 mg-vitamin D 400 units (CALTRATE) 600-400 MG-UNIT per tablet 1 tablet by Per J Tube  route 2 times daily (with meals)   Claribel Franks MD     cyanocobalamin (CYANOCOBALAMIN) 500 MCG tablet 1 tablet (500 mcg) by Per Feeding Tube route daily   Greg Pritchard MD     dapsone 2 mg/mL SUSP 25 mLs (50 mg) by Per J Tube route Every Mon, Wed, Fri Morning   Greg Pritchard MD No    Guar Gum (FIBER MODULAR, NUTRISOURCE FIBER,) packet 1 packet by Per Feeding Tube route 3 times daily (with meals)   Kaylin Triana PA-C     insulin pen needle (32G X 4 MM) 32G X 4 MM miscellaneous Use 4 pen needles daily or as directed.   Claribel Franks MD     loperamide (IMODIUM A-D) 2 MG tablet 1 tablet (2 mg) by Per J Tube route 4 times daily as needed for diarrhea   Nguyen Johnson, CNP     metoprolol tartrate (LOPRESSOR) 50 MG tablet 0.5 tablets (25 mg) by Per Feeding Tube route 2 times daily   Claribel Franks MD Yes    Nutritional Supplements (GLUCOSE MANAGEMENT) TABS Take 3-4 tablets by mouth as needed (hypoglycemia) Pharmacist may change instructions to fit whatever glucose tab product they have in stock.   Kaylin Triana PA-C     pantoprazole (PROTONIX) 2 mg/mL SUSP suspension 20 mLs (40 mg) by Per J Tube route 2 times daily   Claribel Franks MD     predniSONE (DELTASONE) 5 MG tablet Take 1 tab (5mg) at AM and 1/2 tab (2.5) in pm   Claribel Franks MD No    protein modular (PROSOURCE TF) LIQD 1 packet by Per Feeding Tube route daily   Claribel Franks MD     Sharps Container MISC 1 sharps container   Claribel Franks MD     tacrolimus (GENERIC EQUIVALENT) 1 mg/mL suspension Take 3 mLs (3 mg) by mouth every morning AND 2.5 mLs (2.5 mg) every evening. Total dose: 3mg in AM and 2.5mg in PM   Kaylin Triana PA-C Yes    vancomycin (FIRVANQ) 50 MG/ML oral solution Take 2.5 mLs (125 mg) by mouth 4 times daily for 10 days, THEN 2.5 mLs (125 mg) 2 times daily for 7 days, THEN 2.5 mLs (125 mg) daily for 7 days, THEN 2.5 mLs (125 mg) every other day for 14 days.   Kaylin Triana PA-C   6/16/23   albuterol (PROAIR HFA/PROVENTIL HFA/VENTOLIN HFA) 108 (90 BASE) MCG/ACT Inhaler Inhale 2 puffs into the lungs every 6 hours as needed for shortness of breath / dyspnea or wheezing   Reported, Patient Yes 8/15/22   furosemide (LASIX) 20 MG tablet Take 1 tablet (20 mg) by mouth daily   Kaylin Triana PA-C Yes 10/8/22   insulin glargine (LANTUS PEN) 100 UNIT/ML pen Inject 7 Units Subcutaneous 2 times daily   Susan Delacruz MD Yes 10/8/22   ipratropium - albuterol 0.5 mg/2.5 mg/3 mL (DUONEB) 0.5-2.5 (3) MG/3ML neb solution Inhale 1 vial into the lungs every 4 hours as needed for shortness of breath / dyspnea   Unknown, Entered By History Yes 8/15/22   mycophenolate (GENERIC EQUIVALENT) 200 MG/ML suspension 3.75 mLs (750 mg) by Per J Tube route 2 times daily   Claribel Franks MD Yes 10/8/22   valGANciclovir (VALCYTE) 50 MG/ML solution 9 mLs (450 mg) by Oral or Feeding Tube route three times a week  Patient taking differently: 450 mg by Oral or Feeding Tube route three times a week Take on Mon/Wed/Fri   Claribel Franks MD Yes 10/8/22      Current Meds    cyanocobalamin  500 mcg Per Feeding Tube Daily     [START ON 5/19/2023] dapsone  50 mg Per J Tube Q Mon Wed Fri AM     fiber modular (NUTRISOURCE FIBER)  1 packet Per Feeding Tube TID w/meals     lactobacillus rhamnosus (GG)  1 capsule Oral BID     lipase-protease-amylase  1 capsule Oral TID w/meals     metoprolol tartrate  25 mg Per Feeding Tube BID     pantoprazole  40 mg Per J Tube BID     predniSONE  2.5 mg Oral or Feeding Tube QPM     predniSONE  5 mg Oral QAM     sodium chloride (PF)  3 mL Intracatheter Q8H     tacrolimus  2.5 mg Oral QPM     tacrolimus  3 mg Oral or Feeding Tube QAM     vancomycin  125 mg Oral 4x Daily     Infusion Meds      ALLERGIES:    Allergies   Allergen Reactions     Blood Transfusion Related (Informational Only) Other (See Comments)     Patient has a history of a clinically significant antibody against RBC  antigens.  A delay in compatible RBCs may occur.        REVIEW OF SYSTEMS:  A comprehensive of systems was negative except as noted above.    SOCIAL HISTORY:   Social History     Socioeconomic History     Marital status: Single     Spouse name: Not on file     Number of children: Not on file     Years of education: Not on file     Highest education level: Not on file   Occupational History     Not on file   Tobacco Use     Smoking status: Former     Years: 30.00     Types: Cigarettes     Quit date: 2020     Years since quittin.5     Smokeless tobacco: Never   Vaping Use     Vaping status: Not on file   Substance and Sexual Activity     Alcohol use: Not Currently     Drug use: Not Currently     Types: Marijuana, Methamphetamines     Comment: hx:marijuana and methamphetamine-quit both unsure ?  2-3 yrs ago     Sexual activity: Not on file   Other Topics Concern     Parent/sibling w/ CABG, MI or angioplasty before 65F 55M? Not Asked   Social History Narrative     Not on file     Social Determinants of Health     Financial Resource Strain: Not on file   Food Insecurity: Not on file   Transportation Needs: Not on file   Physical Activity: Not on file   Stress: Not on file   Social Connections: Not on file   Intimate Partner Violence: Not on file   Housing Stability: Not on file     Reviewed with patient     FAMILY MEDICAL HISTORY:     No known family history of kidney disease  Reviewed with patient     PHYSICAL EXAM:   Temp  Av.6  F (37  C)  Min: 98.4  F (36.9  C)  Max: 98.8  F (37.1  C)      Pulse  Av.2  Min: 76  Max: 88 Resp  Av  Min: 16  Max: 16  SpO2  Av.4 %  Min: 96 %  Max: 98 %       /68   Pulse 76   Temp 98.8  F (37.1  C) (Oral)   Resp 16   SpO2 96%       Admit       GENERAL APPEARANCE: alert, NAD  EYES: no scleral icterus, pupils equal  Pulmonary: CTA  CV: RRR   - Edema none  GI: soft   MS: no evidence of inflammation in joints, no muscle tenderness  : no montgomery  SKIN: no  rash, warm, dry, no cyanosis  NEURO: face symmetric, a/o3  Access: [facility]    LABS:   I have reviewed the following labs:  CMP  Recent Labs   Lab 05/18/23  0554 05/17/23  1657 05/12/23  0811    142  --    POTASSIUM 5.0 3.9  --    CHLORIDE 103 103 98   CO2 28 28  --    ANIONGAP 10 11  --    GLC 83 161*  --    BUN 18.8 18.0  --    CR 5.79* 5.14*  --    GFRESTIMATED 8* 9*  --    ESTUARDO 9.5 9.7  --    MAG  --  2.0  --    PROTTOTAL 6.3* 7.1  --    ALBUMIN 3.7 4.3  --    BILITOTAL 0.6 0.6  --    ALKPHOS 48 54  --    AST 26 27  --    ALT 9* 12  --      CBC  Recent Labs   Lab 05/18/23  0554 05/17/23  1657   HGB 10.7* 11.7   WBC 2.2* 2.2*   RBC 2.79* 3.04*   HCT 34.8* 37.6   * 124*   MCH 38.4* 38.5*   MCHC 30.7* 31.1*   RDW 17.1* 17.3*    215     INR  Recent Labs   Lab 05/17/23  1657 05/12/23  0811   INR 0.98 0.9     ABG  Recent Labs   Lab 05/17/23  1703   PH 7.37      URINE STUDIES  Recent Labs   Lab Test 09/19/22  2254 08/01/22  0319 07/08/22  0831 07/05/22  1004 06/28/22  1309   COLOR Yellow Light Yellow Yellow Yellow Straw   APPEARANCE Slightly Cloudy* Clear Clear Slightly Cloudy* Clear   URINEGLC Negative Negative Negative Negative Negative   URINEBILI Negative Negative Negative Negative Negative   URINEKETONE Negative Negative Negative Trace* Negative   SG 1.015 1.015 1.016 1.030 1.004   UBLD Moderate* Negative Small* Small* Negative   URINEPH 7.0 8.0* 5.5 5.5 5.0   PROTEIN Negative Negative 30* 100* Negative   NITRITE Negative Negative Negative Negative Negative   LEUKEST Large* Negative Negative Negative Negative   RBCU 3*  --  <1 15* 0   WBCU 29*  --  1 5 1     No lab results found.  PTH  Recent Labs   Lab Test 09/26/22  2359 09/22/22  0551   PTHI 46 30     IRON STUDIES  Recent Labs   Lab Test 09/26/22  0555 09/03/22  1039 08/24/22  0810 07/21/22  0122   IRON 54 21* 41 31*    233* 196* 285   IRONSAT 22 9* 21 11*   CARLOS 769* 343* 334* 189       IMAGING:  Reviewed    RABIA Moctezuma

## 2023-05-18 NOTE — PHARMACY-CONSULT NOTE
Pharmacy Tube Feeding Consult    Medication reviewed for administration by feeding tube and for potential food/drug interactions.    Recommendation: Recommend changing the following medications to a liquid dosage form: oral vancomycin (done for you)     Pharmacy will continue to follow as new medications are ordered.    Irina Mcmahon, [facility]

## 2023-05-18 NOTE — CONSULTS
GASTROENTEROLOGY CONSULTATION    Date of Admission:  5/17/2023       ASSESSMENT AND RECOMMENDATIONS:   62 year old female with COPD s/p bilateral lung transplant 6/28/22 complicated by persistent bilateral pleural effusion, recurrent C.diff colitis, gastroparesis on GJ tube since 7/2022, EBV viremia, history of hemobilia, who is admitted with recurrent C.diff infection, and weight loss. GI consulted for concerns of malabsorption and diarrhea.    # Chronic diarrhea, related to diet  # Exocrine pancreatic insufficiency  Diarrhea is chronic, worsening for the past 5 weeks. Diarrhea is related to the food including both tube feeding and the diet. Likely osmotic diarrhea. Could also be from pancreatic insufficiency (though unclear if she has chronic pancreatitis), but agree with trial of Creon. Patient recently had colonoscopy 3/2023 with unremarkable biopsy, so would defer repeating colonoscopy at this time.    # C.diff infection  Had C.diff infection with complete treatment with oral vancomycin 7/2022, and recurrent with positive stool PCR at [facility] (coud also be colonization; no toxin checked) 5/8 which she was started on treatment with QID oral vancomycin. Her diarrhea was not improved with C.diff treatment, but resolved with stop tube feeding which is somewhat against C.diff colitis which is more inflammatory diarrhea.    # Gastroparesis, on GJ tube since 7/2022; related to post lung transplant     RECOMMENDATIONS  - Please order stool osmol, and stool electrolyte  - Appreciate dietitian consult for adjusting her tube feeding  - Agree with trial of Creon, dosing per dietitian  - Consider stopping her oral vancomycin given no improvement with medication, and she has complete the treatment. Also, could be from colonization with only PCR was checked (not toxin).  - Continue loperamide, can give up to 16 g a day.    Gastroenterology follow up recommendations: TBD    Thank you for involving us in this patient's  "care. Please do not hesitate to contact the GI service with any questions or concerns.     Patient care plan discussed with Dr. Cadena, GI staff physician.    Joshua Morgan MD  GI fellow          Chief Complaint:   We were asked to evaluate this patient with \"w/u for malabsorptive syndrome; Pt with hx of b/l lung transplant on tube feeds coming in w/ongoing diarrhea especially overnight w/feeds. >25 lb weight loss in 5 weeks. Clouded by recent c diff diagnosis\"  History is obtained from the patient and the medical record.          History of Present Illness:   Sofie Rodriguez is a 62 year old female with COPD s/p bilateral lung transplant 6/28/22 complicated by persistent bilateral pleural effusion, recurrent C.diff colitis, gastroparesis on GJ tube since 7/2022, EBV viremia, history of hemobilia, who is admitted with recurrent C.diff infection, and weight loss. GI consulted for concerns of malabsorption and diarrhea.    Presented with 5 weeks of worsening diarrhea from her baseline from 3 times/day to >10 times per day at night. She has a tubefeeding running at night from 8 pm to 8 am. She tried to stop her tubefeeding several times, which cause a resolution of her diarrhea right away. She has stopped her tubefeeding since 5/16/23 with significant improvement of her diarrhea. Lost 20 lbs for the past 5 weeks.    C.diff PCR positive 5/8/23 and was started on treatment with oral vancomycin up to present, now at day 10. Symptom not improved, but improved after Tuesday when she stopped her tube feeding.     Gastroparesis developed post lung transplant, and     - C.diff (vancomycin 7/12/22-7/28/22);   - Has delayed gastric emptying on GES and underwent percutaneous GJ tube placement by IR on 7/27/22.  - ERCP done 6/2022 for dilated bile duct    Reactive HCV antibody - not detectable RNA.     NSAID use: Denies    Prior endoscopy:   - Colonoscopy 3/2023 for diarrhea  Impression:            - The entire examined " colon is normal. Biopsied.                          - Diverticulosis in the sigmoid colon and in the                          descending colon.                          - One 4 to 8 mm polyp in the ascending colon, removed                          with a cold snare. Resected and retrieved.                          - The examined portion of the ileum was normal.                          Biopsied.    Patho:  Final Diagnosis     A. TERMINAL ILEUM, BIOPSY  --ILEAL MUCOSA WITH NO DIAGNOSTIC ABNORMALITY     B. COLON, ASCENDING/RIGHT, BIOPSY  --FRAGMENTS OF TUBULAR ADENOMA     C. 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     Hepatitis 2017    Hep C, [facility]     HTN (hypertension)      Lung infection 11/30/2022     Osteopenia             Past Surgical History:   Reviewed and edited as appropriate   Past Surgical History:   Procedure Laterality Date     BRONCHOSCOPY (RIGID OR FLEXIBLE), DIAGNOSTIC N/A 8/2/2022    Procedure: BRONCHOSCOPY, DIAGNOSTIC- inspection Bronch;  Surgeon: Kamala Lovell MD;  Location: [facility]     BRONCHOSCOPY (RIGID OR FLEXIBLE), DIAGNOSTIC N/A 9/13/2022    Procedure: INSPECTION BRONCHOSCOPY, WITH BRONCHOALVEOLAR LAVAGE;  Surgeon: Jose R Mccullough MD;  Location: [facility]     BRONCHOSCOPY (RIGID OR FLEXIBLE), DIAGNOSTIC N/A 11/9/2022    Procedure: BRONCHOSCOPY, WITH BRONCHOALVEOLAR LAVAGE AND BIOPSY;  Surgeon: Cesar Lima MD;  Location: [facility]     BRONCHOSCOPY (RIGID OR FLEXIBLE), DIAGNOSTIC N/A 1/25/2023    Procedure: BRONCHOSCOPY, WITH BRONCHOALVEOLAR LAVAGE AND BIOPSY;  Surgeon: Mason Reddy MD;  Location: [facility]     BRONCHOSCOPY  (RIGID OR FLEXIBLE), DIAGNOSTIC N/A 4/19/2023    Procedure: BRONCHOSCOPY, WITH BRONCHOALVEOLAR LAVAGE AND BIOPSY;  Surgeon: Kamala Lovell MD;  Location:  GI     BRONCHOSCOPY FLEXIBLE AND RIGID N/A 07/19/2022    Procedure: BRONCHOSCOPY inspection only;  Surgeon: Bob Liao MD;  Location:  GI     COLONOSCOPY  2015     CV CORONARY ANGIOGRAM N/A 06/30/2021    Procedure: CV CORONARY ANGIOGRAM;  Surgeon: Alexander Cuellar MD;  Location:  [facility]     CV RIGHT HEART CATH MEASUREMENTS RECORDED N/A 06/30/2021    Procedure: CV RIGHT HEART CATH;  Surgeon: Alexander Cuellar MD;  Location:  [facility]     ENDOSCOPIC RETROGRADE CHOLANGIOPANCREATOGRAM N/A 8/11/2022    Procedure: ENDOSCOPIC RETROGRADE CHOLANGIOPANCREATOGRAPHY WITH PANCREATIC DUCT NEEDLE KNIFE AND STENT PLACEMENT, BILE DUCT SPHINCTEROTOMY, BLOOD/DEBRIS REMOVAL AND STENT PLACEMENT;  Surgeon: Cosmo Arroyo MD;  Location: [facility]     ENDOSCOPIC RETROGRADE CHOLANGIOPANCREATOGRAM N/A 10/7/2022    Procedure: ENDOSCOPIC RETROGRADE CHOLANGIOPANCREATOGRAPHY with biliary and pancreatic stent removal, debris removal;  Surgeon: Cosmo Arroyo MD;  Location:  OR     ENT SURGERY  1974    tonsillectomy     ENTEROSCOPY SMALL BOWEL N/A 8/11/2022    Procedure: SMALL BOWEL ENTEROSCOPY;  Surgeon: Cosmo Arroyo MD;  Location:  OR     ESOPHAGOGASTRODUODENOSCOPY, WITH NASOGASTRIC TUBE INSERTION N/A 07/01/2022    Procedure: ESOPHAGOGASTRODUODENOSCOPY, WITH NASOJEJUNAL TUBE INSERTION;  Surgeon: Ozzy Nickerson MD;  Location:  GI     ESOPHAGOSCOPY, GASTROSCOPY, DUODENOSCOPY (EGD), COMBINED N/A 8/3/2022    Procedure: ESOPHAGOGASTRODUODENOSCOPY (EGD);  Surgeon: Ira Andres MD;  Location:  GI     ESOPHAGOSCOPY, GASTROSCOPY, DUODENOSCOPY (EGD), COMBINED N/A 1/25/2023    Procedure: ESOPHAGOGASTRODUODENOSCOPY (EGD) with botox injection;  Surgeon: Gonzalez Navarro MD;  Location:  GI     HAND  SURGERY       INSERT CHEST TUBE Right 2022    Procedure: Insert chest tube;  Surgeon: Jose R Mccullough MD;  Location: [facility]     IR CVC TUNNEL PLACEMENT > 5 YRS OF AGE  2022     IR GASTRO JEJUNOSTOMY TUBE CHANGE  2022     IR GASTRO JEJUNOSTOMY TUBE CHANGE  2022     IR GASTRO JEJUNOSTOMY TUBE PLACEMENT  2022     IR THORACENTESIS  2022     LEEP TX, CERVICAL  2017    HECTOR III     LYMPH NODE BIOPSY Left     Left axilla, benign- [place]     MIDLINE INSERTION - DOUBLE LUMEN Left 2022    20cm, Basilic vein     REPLACE GASTROJEJUNOSTOMY TUBE, PERCUTANEOUS  10/7/2022    Procedure: Replace Gastrojejunostomy Tube;  Surgeon: Cosmo Arroyo MD;  Location: [facility]     THORACENTESIS Left 2022    Procedure: THORACENTESIS;  Surgeon: Bo Capone PA-C;  Location: [facility] OR     THORACENTESIS Left 2022    Procedure: Thoracentesis;  Surgeon: Jose R Mccullough MD;  Location: [facility]     THROMBECTOMY UPPER EXTREMITY Left 2022    Procedure: LEFT RADIAL ARM THROMBECTOMY;  Surgeon: Christie Graham MD;  Location: [facility]     TRANSPLANT LUNG RECIPIENT SINGLE X2 Bilateral 2022    Procedure: Clamshell Incision, Bilateral Sequential Lung Transplant, On Cardiopulmonary Bypass, Flexible Bronchoscopy;  Surgeon: Sue Sunshine MD;  Location: [facility]            Social History:   Reviewed and edited as appropriate  Social History     Socioeconomic History     Marital status: Single     Spouse name: Not on file     Number of children: Not on file     Years of education: Not on file     Highest education level: Not on file   Occupational History     Not on file   Tobacco Use     Smoking status: Former     Years: 30.00     Types: Cigarettes     Quit date: 2020     Years since quittin.5     Smokeless tobacco: Never   Vaping Use     Vaping status: Not on file   Substance and Sexual Activity     Alcohol use: Not Currently     Drug use: Not Currently     Types:  Marijuana, Methamphetamines     Comment: hx:marijuana and methamphetamine-quit both unsure ?  2-3 yrs ago     Sexual activity: Not on file   Other Topics Concern     Parent/sibling w/ CABG, MI or angioplasty before 65F 55M? Not Asked   Social History Narrative     Not on file     Social Determinants of Health     Financial Resource Strain: Not on file   Food Insecurity: Not on file   Transportation Needs: Not on file   Physical Activity: Not on file   Stress: Not on file   Social Connections: Not on file   Intimate Partner Violence: Not on file   Housing Stability: Not on file            Family History:   Reviewed and edited as appropriate  No known history of gastrointestinal/liver disease or  gastrointestinal malignancies       Allergies:   Reviewed and edited as appropriate     Allergies   Allergen Reactions     Blood Transfusion Related (Informational Only) Other (See Comments)     Patient has a history of a clinically significant antibody against RBC antigens.  A delay in compatible RBCs may occur.             Medications:   (Not in a hospital admission)            Review of Systems:     A complete review of systems was performed and is negative except as noted in the HPI           Physical Exam:   BP (!) 129/95 (BP Location: Right arm, Cuff Size: Adult Large)   Pulse 85   Temp 98.5  F (36.9  C) (Oral)   Resp 14   SpO2 99%   Wt:   Wt Readings from Last 2 Encounters:   03/01/23 64.4 kg (142 lb)   01/25/23 62.1 kg (136 lb 14.5 oz)      Constitutional: no acute distress  HEENT: Sclera anicteric  CV: No edema  Respiratory: Unlabored breathing  Abdomen: Non-distended, nontender, no peritoneal signs  Skin: warm, perfused  Neuro: AAO x 3, No asterixis         Data:   Labs and imaging below were independently reviewed and interpreted    Imaging: Reviewed.

## 2023-05-18 NOTE — CONSULTS
Pulmonary Medicine  Cystic Fibrosis - Lung Transplant Team  Initial Consultation  May 18, 2023      Patient: Sofie Rodriguez  MRN: [id number]  : 1960 (age 62 year old)  Transplant: 2022 (Lung), POD#324  Admission date: 2023  Primary Care Provider: Vinny Berrios    Assessment & Plan:     Sofie Rodriguez is a 62 year old female with a history of COPD s/p BSLT (22) complicated by post-operative hemidiaphragm palsy, chronic hypercapnia, persistent pleural effusions, recurrent PNAs, positive DSA, EBV viremia, hypogammaglobulinemia, gastroparesis s/p G/J tube placement (22) and pyloric botox (23), GI bleed 2/2 pyloric ulcer, hemobilia s/p ERCP and MRCP, chronic diarrhea, recurrent C diff colitis, and ESRD (on iHD TTSa).  PMH also notable for HFpEF, ASCVD, NTM colonization, hep C, and methamphetamine use.  The patient was admitted on 23 for intolerance of tube feedings with weight loss, neutropenia, and failure to thrive.    S/p bilateral sequential lung transplant (BSLT) for COPD:   Right hemidiaphragm palsy: Pulmonary symptoms stable.  Recent bronch with BAL  with mild erythema t/o right, mild-moderate t/o left, and minimal secretions t/o.  Fungus on cytology, cultures otherwise negative.  - ImmuKnow assay ordered to assess total immune response given 2-drug IST and AMR in the setting of recurrent C diff    Immunosuppression:  - Tacrolimus 3 mg qAM / 2.5 mg qPM.  Goal level 8-12.  Last tacro level 12.7 on  (dose reduced).  Level on  per primary team but will disregard as this was a random level, not trough.  Next tacro level tomorrow (ordered).  - Imuran on hold since  for leukopenia  - Prednisone 5 mg qAM / 2.5 mg qPM    Prophylaxis:   - Dapsone qMWF for PJP ppx  - CMV ppx not currently indicated, D+/R+, recent CMV () negative    Positive DSA:  No prior treatment for AMR but has been a concern since pt. initially demonstrated rising DSA post-transplant.   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  Most recently 10.314 (4/18).  Also with h/o DR52 Ab (11/2-11/23/22, mfi 846-1072) and CW10 (10/19-12/9/22, mfi 527-1600).  - Repeat DSA ordered    Hypogammaglobulinemia: H/o prior IVIG infusions but most recent IgG stable at 844 (11/11/22).  - Repeat IgG ordered    EBV viremia: Recently 3k in January, most recently <35 (5/12).  Repeat as OP.    Other relevant problems being managed by the primary team:    Severe malnutrition of chronic illness:  Gastroparesis s/p PEG/J and botox:   Pyloric ulcer:  Acute on chronic diarrhea:  Recurrent C diff colitis: Chronic diarrhea since transplant with recurrent episodes of C diff, most recently diagnosed on 5/9 and started on vancomycin.  Stool urgency and frequency persisting with tube feedings but decreased by about half when off cycled feeds.  Reports only tolerating about 1-2 hours of cycled tube feeds on average at home d/t nausea (no emesis), cramping, and diarrhea.  No improvement after formula change as OP on 4/18.  Very little PO intake otherwise, reporting similar symptoms in addition to early satiety.  Weight is down ~25# in the past month.  AXR confirmed G/J tube placement.  GI consulted by primary, awaiting recs.  - O&P and fecal calprotectin ordered for you  - PO vancomycin (5/9) with prolonged taper, per primary and GI  - Pancreatic enzymes (h/o abnormal fecal elastase) per RD  - TF per RD, recommend to evaluate for possible allergen    Leukopenia: Likely 2/2 medications but persisting despite holding Imuran on 5/8.  Will continue to hold, further management per primary.  - Blood cultures (5/17) pending    We appreciate the excellent care provided by the [place] [id number] team.  Recommendations communicated via in person rounding and this note.  Will continue to follow along closely, please do not hesitate to call with any questions or concerns.    Patient discussed with   Golden.    Catherine Johnson, DNP, APRN, CNP  Inpatient Nurse Practitioner  Pulmonary CF/Transplant     Chief Complaint:     Weight loss, neutropenia, and failure to thrive    History of Present Illness:     History obtained from patient and chart.    Chronic diarrhea since transplant with recurrent episodes of C diff, most recently diagnosed on 5/9 and started on vancomycin.  Stool urgency and frequency persisting with tube feedings but decreased by about half when off cycled feeds.  Reports only tolerating about 1-2 hours of cycled tube feeds on average at home d/t nausea (no emesis), cramping, and diarrhea.  No improvement after formula change as OP on 4/18.  Very little PO intake otherwise, reporting similar symptoms in addition to early satiety.  Weight is down ~25# in the past month.     Pulmonary symptoms stable, no hypoxia.  Denies missing any medication doses.  Reports good activity tolerance at baseline, but recently somewhat more weak d/t poor nutrition.    Review of Systems:     Complete ROS negative except as noted in HPI.    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     Hepatitis 2017    Hep C, [facility]     HTN (hypertension)      Lung infection 11/30/2022     Osteopenia      Past Surgical History:   Procedure Laterality Date     BRONCHOSCOPY (RIGID OR FLEXIBLE), DIAGNOSTIC N/A 8/2/2022    Procedure: BRONCHOSCOPY, DIAGNOSTIC- inspection Bronch;  Surgeon: Kamala Lovell MD;  Location:  GI     BRONCHOSCOPY (RIGID OR FLEXIBLE), DIAGNOSTIC N/A 9/13/2022    Procedure: INSPECTION BRONCHOSCOPY, WITH BRONCHOALVEOLAR LAVAGE;  Surgeon: Jose R Mccullough MD;  Location:  GI     BRONCHOSCOPY (RIGID OR FLEXIBLE), DIAGNOSTIC N/A 11/9/2022    Procedure: BRONCHOSCOPY, WITH BRONCHOALVEOLAR LAVAGE AND BIOPSY;   Surgeon: Cesar Lima MD;  Location:  GI     BRONCHOSCOPY (RIGID OR FLEXIBLE), DIAGNOSTIC N/A 1/25/2023    Procedure: BRONCHOSCOPY, WITH BRONCHOALVEOLAR LAVAGE AND BIOPSY;  Surgeon: Mason Reddy MD;  Location:  GI     BRONCHOSCOPY (RIGID OR FLEXIBLE), DIAGNOSTIC N/A 4/19/2023    Procedure: BRONCHOSCOPY, WITH BRONCHOALVEOLAR LAVAGE AND BIOPSY;  Surgeon: Kamala Lovell MD;  Location:  GI     BRONCHOSCOPY FLEXIBLE AND RIGID N/A 07/19/2022    Procedure: BRONCHOSCOPY inspection only;  Surgeon: Bob Liao MD;  Location:  GI     COLONOSCOPY  2015     CV CORONARY ANGIOGRAM N/A 06/30/2021    Procedure: CV CORONARY ANGIOGRAM;  Surgeon: Alexander Cuellar MD;  Location:  HEART CARDIAC CATH LAB     CV RIGHT HEART CATH MEASUREMENTS RECORDED N/A 06/30/2021    Procedure: CV RIGHT HEART CATH;  Surgeon: Alexander Cuellar MD;  Location:  HEART CARDIAC CATH LAB     ENDOSCOPIC RETROGRADE CHOLANGIOPANCREATOGRAM N/A 8/11/2022    Procedure: ENDOSCOPIC RETROGRADE CHOLANGIOPANCREATOGRAPHY WITH PANCREATIC DUCT NEEDLE KNIFE AND STENT PLACEMENT, BILE DUCT SPHINCTEROTOMY, BLOOD/DEBRIS REMOVAL AND STENT PLACEMENT;  Surgeon: Cosmo Arroyo MD;  Location:  OR     ENDOSCOPIC RETROGRADE CHOLANGIOPANCREATOGRAM N/A 10/7/2022    Procedure: ENDOSCOPIC RETROGRADE CHOLANGIOPANCREATOGRAPHY with biliary and pancreatic stent removal, debris removal;  Surgeon: Cosmo Arroyo MD;  Location:  OR     ENT SURGERY  1974    tonsillectomy     ENTEROSCOPY SMALL BOWEL N/A 8/11/2022    Procedure: SMALL BOWEL ENTEROSCOPY;  Surgeon: Cosmo Arroyo MD;  Location:  OR     ESOPHAGOGASTRODUODENOSCOPY, WITH NASOGASTRIC TUBE INSERTION N/A 07/01/2022    Procedure: ESOPHAGOGASTRODUODENOSCOPY, WITH NASOJEJUNAL TUBE INSERTION;  Surgeon: Ozzy Nickerson MD;  Location:  GI     ESOPHAGOSCOPY, GASTROSCOPY, DUODENOSCOPY (EGD), COMBINED N/A 8/3/2022    Procedure: ESOPHAGOGASTRODUODENOSCOPY (EGD);  Surgeon: Mckenna  Ira Márquez MD;  [facility]     ESOPHAGOSCOPY, GASTROSCOPY, DUODENOSCOPY (EGD), COMBINED N/A 1/25/2023    Procedure: ESOPHAGOGASTRODUODENOSCOPY (EGD) with botox injection;  Surgeon: Gonzalez Navarro MD;  [facility]     HAND SURGERY       INSERT CHEST TUBE Right 9/13/2022    Procedure: Insert chest tube;  Surgeon: Jose R Mccullough MD;  [facility]     IR CVC TUNNEL PLACEMENT > 5 YRS OF AGE  9/26/2022     IR GASTRO JEJUNOSTOMY TUBE CHANGE  8/31/2022     IR GASTRO JEJUNOSTOMY TUBE CHANGE  12/21/2022     IR GASTRO JEJUNOSTOMY TUBE PLACEMENT  7/27/2022     IR THORACENTESIS  8/29/2022     LEEP TX, CERVICAL  04/07/2017    HECTOR III     LYMPH NODE BIOPSY Left 2005    Left axilla, benign- [place]     MIDLINE INSERTION - DOUBLE LUMEN Left 07/28/2022    20cm, Basilic vein     REPLACE GASTROJEJUNOSTOMY TUBE, PERCUTANEOUS  10/7/2022    Procedure: Replace Gastrojejunostomy Tube;  Surgeon: Cosmo Arroyo MD;  [facility]     THORACENTESIS Left 8/29/2022    Procedure: THORACENTESIS;  Surgeon: Bo Capone PA-C;  Location: [facility] OR     THORACENTESIS Left 9/13/2022    Procedure: Thoracentesis;  Surgeon: Jose R Mccullough MD;  [facility]     THROMBECTOMY UPPER EXTREMITY Left 07/02/2022    Procedure: LEFT RADIAL ARM THROMBECTOMY;  Surgeon: Christie Graham MD;  Location:  OR     TRANSPLANT LUNG RECIPIENT SINGLE X2 Bilateral 06/28/2022    Procedure: Clamshell Incision, Bilateral Sequential Lung Transplant, On Cardiopulmonary Bypass, Flexible Bronchoscopy;  Surgeon: Sue Sunshine MD;  [facility]     Social and Family History:     Social History     Socioeconomic History     Marital status: Single     Spouse name: Not on file     Number of children: Not on file     Years of education: Not on file     Highest education level: Not on file   Occupational History     Not on file   Tobacco Use     Smoking status: Former     Years: 30.00     Types: Cigarettes     Quit date:  2020     Years since quittin.5     Smokeless tobacco: Never   Vaping Use     Vaping status: Not on file   Substance and Sexual Activity     Alcohol use: Not Currently     Drug use: Not Currently     Types: Marijuana, Methamphetamines     Comment: hx:marijuana and methamphetamine-quit both unsure ?  2-3 yrs ago     Sexual activity: Not on file   Other Topics Concern     Parent/sibling w/ CABG, MI or angioplasty before 65F 55M? Not Asked   Social History Narrative     Not on file     Social Determinants of Health     Financial Resource Strain: Not on file   Food Insecurity: Not on file   Transportation Needs: Not on file   Physical Activity: Not on file   Stress: Not on file   Social Connections: Not on file   Intimate Partner Violence: Not on file   Housing Stability: Not on file     No family history on file.  Allergies and Home Medications:     Allergies   Allergen Reactions     Blood Transfusion Related (Informational Only) Other (See Comments)     Patient has a history of a clinically significant antibody against RBC antigens.  A delay in compatible RBCs may occur.      (Not in a hospital admission)    Current Scheduled Meds    cyanocobalamin  500 mcg Per Feeding Tube Daily     [START ON 2023] dapsone  50 mg Per J Tube Q Mon Wed Fri AM     fiber modular (NUTRISOURCE FIBER)  1 packet Per Feeding Tube TID w/meals     lactobacillus rhamnosus (GG)  1 capsule Oral BID     lipase-protease-amylase  2 capsule Oral TID w/meals     metoprolol tartrate  25 mg Per Feeding Tube BID     pantoprazole  40 mg Per J Tube BID     predniSONE  2.5 mg Oral or Feeding Tube QPM     predniSONE  5 mg Oral QAM     sodium chloride (PF)  3 mL Intracatheter Q8H     tacrolimus  2.5 mg Oral QPM     tacrolimus  3 mg Oral or Feeding Tube QAM      Current PRN Meds  sodium chloride 0.9%, acetaminophen **OR** acetaminophen, alteplase, alteplase, dextrose, lidocaine 4%, lidocaine (buffered or not buffered), loperamide, melatonin,  sodium chloride (PF), sodium chloride (PF), sodium chloride (PF)     Physical Exam:     All notes, images, and labs from past 24 hours (at minimum) were reviewed.    Vital signs:  Temp: 98.8  F (37.1  C) Temp src: Oral BP: 136/79 Pulse: 82   Resp: 12 SpO2: 97 % O2 Device: None (Room air) Oxygen Delivery: 2 LPM      I/O:     Intake/Output Summary (Last 24 hours) at 5/18/2023 1439  Last data filed at 5/18/2023 0808  Gross per 24 hour   Intake 90 ml   Output --   Net 90 ml     Constitutional: Lying in bed, in no apparent distress.   HEENT: Eyes with pink conjunctivae, anicteric.  Oral mucosa moist without lesions.  Neck supple without lymphadenopathy.   PULM: Good air flow bilaterally.  No crackles, no rhonchi, no wheezes.  Non-labored breathing on RA.  CV: Normal S1 and S2.  RRR.  No murmur, gallop, or rub.  No peripheral edema.   ABD: NABS, soft, nontender, nondistended.  PEG/J tube site cdi.  MSK: Moves all extremities.   NEURO: Alert and oriented, conversant.   SKIN: Warm, dry.  No rash on limited exam.   PSYCH: Mood stable.      Results:     LABS    CMP:   Recent Labs   Lab 05/18/23  0554 05/17/23  1657 05/12/23  0811    142  --    POTASSIUM 5.0 3.9  --    CHLORIDE 103 103 98   CO2 28 28  --    ANIONGAP 10 11  --    GLC 83 161*  --    BUN 18.8 18.0  --    CR 5.79* 5.14*  --    GFRESTIMATED 8* 9*  --    ESTUARDO 9.5 9.7  --    MAG  --  2.0  --    PHOS 3.6  --   --    PROTTOTAL 6.3* 7.1  --    ALBUMIN 3.7 4.3  --    BILITOTAL 0.6 0.6  --    ALKPHOS 48 54  --    AST 26 27  --    ALT 9* 12  --      CBC:   Recent Labs   Lab 05/18/23  0554 05/17/23  1657   WBC 2.2* 2.2*   RBC 2.79* 3.04*   HGB 10.7* 11.7   HCT 34.8* 37.6   * 124*   MCH 38.4* 38.5*   MCHC 30.7* 31.1*   RDW 17.1* 17.3*    215       INR:   Recent Labs   Lab 05/17/23  1657 05/12/23  0811   INR 0.98 0.9       Glucose:   Recent Labs   Lab 05/18/23  0554 05/17/23  1657   GLC 83 161*       Blood Gas:   Recent Labs   Lab 05/17/23  1703   HCO3V 31*        Culture Data No results for input(s): CULT in the last 168 hours.    Virology Data:   Lab Results   Component Value Date    FLUAH1 Not Detected 04/19/2023    FLUAH3 Not Detected 04/19/2023    ZL6119 Not Detected 04/19/2023    IFLUB Not Detected 04/19/2023    RSVA Not Detected 04/19/2023    RSVB Not Detected 04/19/2023    PIV1 Not Detected 04/19/2023    PIV2 Not Detected 04/19/2023    PIV3 Not Detected 04/19/2023    HMPV Not Detected 04/19/2023       Historical CMV results (last 3 of prior testing):  Lab Results   Component Value Date    CMVQNT <137 (A) 04/19/2023    CMVQNT Not Detected 02/28/2023    CMVQNT Not Detected 01/24/2023     Lab Results   Component Value Date    CMVLOG <2.1 04/19/2023    CMVLOG 3.5 01/25/2023    CMVLOG <2.1 12/21/2022       Urine Studies    Recent Labs   Lab Test 05/18/23  0627 09/19/22  2254   URINEPH 5.0 7.0   NITRITE Negative Negative   LEUKEST Moderate* Large*   WBCU 21* 29*       Most Recent [place] Pulmonary Function Testing (FVC/FEV1 only)  FVC-Pre   Date Value Ref Range Status   02/28/2023 1.54 L    01/24/2023 1.56 L    12/21/2022 1.59 L    11/23/2022 1.44 L      FVC-%Pred-Pre   Date Value Ref Range Status   02/28/2023 54 %    01/24/2023 55 %    12/21/2022 56 %    11/23/2022 47 %      FEV1-Pre   Date Value Ref Range Status   02/28/2023 1.51 L    01/24/2023 1.50 L    12/21/2022 1.54 L    11/23/2022 1.41 L      FEV1-%Pred-Pre   Date Value Ref Range Status   02/28/2023 67 %    01/24/2023 66 %    12/21/2022 68 %    11/23/2022 58 %        IMAGING    Recent Results (from the past 48 hour(s))   XR Chest 2 Views    Narrative    EXAM: XR CHEST 2 VIEWS  5/17/2023 5:20 PM     HISTORY:  dyspnea, s/p lung transplant       COMPARISON:  Chest radiograph 2/28/2023    FINDINGS:   PA and lateral views of the upper chest. Right central venous  dual-lumen central venous catheter with tip projecting over the high  right atrium. Postoperative changes of lung transplantation with  clamshell  sternotomy wires. Calcifications of the aortic knob. Trachea  is mildly deviated to the right. Unchanged left costophrenic angle  blunting. Right costophrenic angle is clear. No pneumothorax. . No  focal consolidative opacity Partially visualized upper abdomen is  unremarkable. No acute osseous abnormality.      Impression    IMPRESSION:   Postoperative changes of prior bilateral lung transplantation.  Unchanged left pleural effusion/atelectasis. No acute airspace opacity    I have personally reviewed the examination and initial interpretation  and I agree with the findings.    NIKOS JAVED MD         SYSTEM ID:  [id number]   XR Abdomen Port 1 View    Narrative    Exam: XR ABDOMEN PORT 1 VIEW, 5/17/2023 10:14 PM    Indication: Pt with active C. diff. Please assess GJ tube placement    Comparison: 11/23/2022    Findings:   Single portable AP supine radiograph of the abdomen. Percutaneous  gastrojejunostomy tube in place with the balloon inflated in the left  lower quadrant in similar position as comparison radiograph and likely  projecting over the stomach with distal end of the tube tip projecting  over the expected location of the jejunum. Partially visualized  central venous catheter tip projects over the right atrium. Clamshell  sternotomy wires.    No obstructive bowel gas pattern. No pneumatosis or portal venous gas.  Lung bases are clear. No acute osseous abnormality.      Impression    Impression:  Percutaneous GJ balloon projects over the left lower  quadrant in expected location of the stomach, distal end of the tube  tip projects over the expected location of the jejunum.    I have personally reviewed the examination and initial interpretation  and I agree with the findings.    NIKOS JAVED MD         SYSTEM ID:  [id number]

## 2023-05-19 DIAGNOSIS — Z94.2 S/P LUNG TRANSPLANT (H): Primary | Chronic | ICD-10-CM

## 2023-05-19 LAB
ALBUMIN SERPL BCG-MCNC: 3.7 G/DL (ref 3.5–5.2)
ALP SERPL-CCNC: 56 U/L (ref 35–104)
ALT SERPL W P-5'-P-CCNC: <5 U/L (ref 10–35)
ANION GAP SERPL CALCULATED.3IONS-SCNC: 9 MMOL/L (ref 7–15)
AST SERPL W P-5'-P-CCNC: 22 U/L (ref 10–35)
BACTERIA UR CULT: NORMAL
BILIRUB SERPL-MCNC: 0.6 MG/DL
BUN SERPL-MCNC: 14.3 MG/DL (ref 8–23)
CALCIUM SERPL-MCNC: 9.3 MG/DL (ref 8.8–10.2)
CALPROTECTIN STL-MCNT: 210 MG/KG (ref 0–49.9)
CHLORIDE SERPL-SCNC: 94 MMOL/L (ref 98–107)
CREAT SERPL-MCNC: 3.6 MG/DL (ref 0.51–0.95)
DEPRECATED HCO3 PLAS-SCNC: 29 MMOL/L (ref 22–29)
DONOR IDENTIFICATION: NORMAL
DQB2: 9100
DSA COMMENTS: NORMAL
DSA PRESENT: YES
DSA TEST METHOD: NORMAL
ERYTHROCYTE [DISTWIDTH] IN BLOOD BY AUTOMATED COUNT: 16.7 % (ref 10–15)
GFR SERPL CREATININE-BSD FRML MDRD: 14 ML/MIN/1.73M2
GLUCOSE BLDC GLUCOMTR-MCNC: 126 MG/DL (ref 70–99)
GLUCOSE SERPL-MCNC: 92 MG/DL (ref 70–99)
HCT VFR BLD AUTO: 34.7 % (ref 35–47)
HGB BLD-MCNC: 10.9 G/DL (ref 11.7–15.7)
IGA SERPL-MCNC: 121 MG/DL (ref 84–499)
LACTATE SERPL-SCNC: 0.8 MMOL/L (ref 0.7–2)
MCH RBC QN AUTO: 38.4 PG (ref 26.5–33)
MCHC RBC AUTO-ENTMCNC: 31.4 G/DL (ref 31.5–36.5)
MCV RBC AUTO: 122 FL (ref 78–100)
ORGAN: NORMAL
PLATELET # BLD AUTO: 191 10E3/UL (ref 150–450)
POTASSIUM SERPL-SCNC: 4.6 MMOL/L (ref 3.4–5.3)
PROT SERPL-MCNC: 6.1 G/DL (ref 6.4–8.3)
RBC # BLD AUTO: 2.84 10E6/UL (ref 3.8–5.2)
SA 1 CELL: NORMAL
SA 1 TEST METHOD: NORMAL
SA 2 CELL: NORMAL
SA 2 TEST METHOD: NORMAL
SA1 HI RISK ABY: NORMAL
SA1 MOD RISK ABY: NORMAL
SA2 HI RISK ABY: NORMAL
SA2 MOD RISK ABY: NORMAL
SODIUM SERPL-SCNC: 132 MMOL/L (ref 136–145)
TACROLIMUS BLD-MCNC: 16.1 UG/L (ref 5–15)
TME LAST DOSE: ABNORMAL H
TME LAST DOSE: ABNORMAL H
UNACCEPTABLE ANTIGENS: NORMAL
UNOS CPRA: 37
WBC # BLD AUTO: 2.4 10E3/UL (ref 4–11)
ZZZSA 1  COMMENTS: NORMAL
ZZZSA 2 COMMENTS: NORMAL

## 2023-05-19 PROCEDURE — 99232 SBSQ HOSP IP/OBS MODERATE 35: CPT | Mod: GC | Performed by: HOSPITALIST

## 2023-05-19 PROCEDURE — 36415 COLL VENOUS BLD VENIPUNCTURE: CPT | Performed by: HOSPITALIST

## 2023-05-19 PROCEDURE — 250N000013 HC RX MED GY IP 250 OP 250 PS 637: Performed by: STUDENT IN AN ORGANIZED HEALTH CARE EDUCATION/TRAINING PROGRAM

## 2023-05-19 PROCEDURE — 85014 HEMATOCRIT: CPT | Performed by: STUDENT IN AN ORGANIZED HEALTH CARE EDUCATION/TRAINING PROGRAM

## 2023-05-19 PROCEDURE — 250N000012 HC RX MED GY IP 250 OP 636 PS 637

## 2023-05-19 PROCEDURE — 83605 ASSAY OF LACTIC ACID: CPT | Performed by: HOSPITALIST

## 2023-05-19 PROCEDURE — 99233 SBSQ HOSP IP/OBS HIGH 50: CPT | Performed by: PHYSICIAN ASSISTANT

## 2023-05-19 PROCEDURE — 80053 COMPREHEN METABOLIC PANEL: CPT | Performed by: STUDENT IN AN ORGANIZED HEALTH CARE EDUCATION/TRAINING PROGRAM

## 2023-05-19 PROCEDURE — 82962 GLUCOSE BLOOD TEST: CPT

## 2023-05-19 PROCEDURE — 84302 ASSAY OF SWEAT SODIUM: CPT | Performed by: STUDENT IN AN ORGANIZED HEALTH CARE EDUCATION/TRAINING PROGRAM

## 2023-05-19 PROCEDURE — 80197 ASSAY OF TACROLIMUS: CPT | Performed by: NURSE PRACTITIONER

## 2023-05-19 PROCEDURE — 214N000001 HC R&B CCU UMMC

## 2023-05-19 PROCEDURE — 250N000012 HC RX MED GY IP 250 OP 636 PS 637: Performed by: PHYSICIAN ASSISTANT

## 2023-05-19 PROCEDURE — 84133 ASSAY OF URINE POTASSIUM: CPT | Performed by: STUDENT IN AN ORGANIZED HEALTH CARE EDUCATION/TRAINING PROGRAM

## 2023-05-19 PROCEDURE — 250N000013 HC RX MED GY IP 250 OP 250 PS 637

## 2023-05-19 PROCEDURE — 250N000009 HC RX 250

## 2023-05-19 PROCEDURE — 36415 COLL VENOUS BLD VENIPUNCTURE: CPT | Performed by: NURSE PRACTITIONER

## 2023-05-19 PROCEDURE — 83935 ASSAY OF URINE OSMOLALITY: CPT | Performed by: STUDENT IN AN ORGANIZED HEALTH CARE EDUCATION/TRAINING PROGRAM

## 2023-05-19 RX ORDER — DEXTROSE MONOHYDRATE 100 MG/ML
INJECTION, SOLUTION INTRAVENOUS CONTINUOUS PRN
Status: DISCONTINUED | OUTPATIENT
Start: 2023-05-19 | End: 2023-05-23 | Stop reason: HOSPADM

## 2023-05-19 RX ADMIN — LOPERAMIDE HCL 2 MG: 1 SOLUTION ORAL at 08:05

## 2023-05-19 RX ADMIN — TACROLIMUS 2 MG: 5 CAPSULE ORAL at 18:07

## 2023-05-19 RX ADMIN — LOPERAMIDE HCL 2 MG: 1 SOLUTION ORAL at 15:43

## 2023-05-19 RX ADMIN — PREDNISONE 5 MG: 5 TABLET ORAL at 08:06

## 2023-05-19 RX ADMIN — METOPROLOL TARTRATE 25 MG: 25 TABLET, FILM COATED ORAL at 08:05

## 2023-05-19 RX ADMIN — Medication 1 PACKET: at 18:07

## 2023-05-19 RX ADMIN — Medication 1 CAPSULE: at 08:05

## 2023-05-19 RX ADMIN — Medication 500 MCG: at 08:05

## 2023-05-19 RX ADMIN — PREDNISONE 2.5 MG: 2.5 TABLET ORAL at 20:08

## 2023-05-19 RX ADMIN — PANCRELIPASE 2 CAPSULE: 24000; 76000; 120000 CAPSULE, DELAYED RELEASE PELLETS ORAL at 15:43

## 2023-05-19 RX ADMIN — TACROLIMUS 3 MG: 5 CAPSULE ORAL at 08:05

## 2023-05-19 RX ADMIN — LOPERAMIDE HCL 2 MG: 1 SOLUTION ORAL at 20:09

## 2023-05-19 RX ADMIN — METOPROLOL TARTRATE 25 MG: 25 TABLET, FILM COATED ORAL at 20:09

## 2023-05-19 RX ADMIN — Medication 1 PACKET: at 12:22

## 2023-05-19 RX ADMIN — Medication 1 PACKET: at 08:06

## 2023-05-19 RX ADMIN — Medication 40 MG: at 20:09

## 2023-05-19 RX ADMIN — Medication 1 CAPSULE: at 20:08

## 2023-05-19 RX ADMIN — DAPSONE 50 MG: 100 TABLET ORAL at 08:05

## 2023-05-19 RX ADMIN — Medication 40 MG: at 08:05

## 2023-05-19 ASSESSMENT — ACTIVITIES OF DAILY LIVING (ADL)
ADLS_ACUITY_SCORE: 35
ADLS_ACUITY_SCORE: 20
ADLS_ACUITY_SCORE: 35
ADLS_ACUITY_SCORE: 20
ADLS_ACUITY_SCORE: 35
ADLS_ACUITY_SCORE: 20
ADLS_ACUITY_SCORE: 35

## 2023-05-19 NOTE — PROGRESS NOTES
GASTROENTEROLOGY PROGRESS NOTE    Date: 05/19/2023     ASSESSMENT:  62 year old female with COPD s/p bilateral lung transplant 6/28/22 complicated by persistent bilateral pleural effusion, recurrent C.diff colitis, gastroparesis on GJ tube since 7/2022, EBV viremia, history of hemobilia, who is admitted with recurrent C.diff infection, and weight loss. GI consulted for concerns of malabsorption and diarrhea with significant weight loss.     # Chronic diarrhea, related to diet/tube feeding  # Exocrine pancreatic insufficiency  Diarrhea is chronic, worsening for the past 5 weeks. Diarrhea is related to the food including both tube feeding and the diet. Consistent with osmotic diarrhea. Could also be from pancreatic insufficiency (though no chronic pancreatitis; pancreas normal on CT 3/2023 - this could be falsely low with diluted stool from diarrhea - plus it was 198 from UNL of 200). Recently had colonoscopy 3/2023 with unremarkable biopsy, so would defer repeating colonoscopy at this time.     # Gastroparesis, on GJ tube since 7/2022; related to post lung transplant     RECOMMENDATIONS  - Pending order stool osmol, and stool electrolyte - please obtain this during the diarrhea episode  - Appreciate dietitian consult for adjusting her tube feeding   - Already adding fiber 16 g/day   - Consider doing 24 hours tube feeding to decrease osmotic load  - Continue loperamide, can try 4 mg 30 minutes prior to the tube feeding, the additional 2 mg if having diarrhea (patient had constipation/nausea with 4 mg BID in the past)    Gastroenterology follow up recommendations: Pending clinical course.      Thank you for involving us in this patient's care. Please do not hesitate to contact the GI service with any questions or concerns.      Pt care plan discussed with Dr. Cadena, GI staff physician.    Joshua Morgan MD  Gastroenterology  Fellow  _______________________________________________________________    Subjective: Had tubefeeding restarted 8.30 pm yesterday, and tolerated only 2.30 hours given diarrhea, and nausea so was stopped. After stopping, no longer has diarrhea/abdominal pain. No fever/chills.    - Home dose of loperamide was 2 mg BID, and had constipation with 4 mg BID in the past admission.     Objective:  Blood pressure 117/81, pulse 88, temperature 98.1  F (36.7  C), temperature source Oral, resp. rate 18, SpO2 100 %, not currently breastfeeding.    Gen: A&Ox3, NAD  HEENT: sclera anicteric  CV: RRR  Lungs: breathing comfortably on room air  Abd: soft, nontender, nondistended  Skin: no jaundice  Neuro: non focal     LABS and imaging: reviewed.

## 2023-05-19 NOTE — PROGRESS NOTES
[facility]    Medicine Progress Note - Medicine Service, MAROON TEAM 5    Resident/Fellow Attestation   I, Gerardo Cuba MD, was present with the medical/EMILY student who participated in the service and in the documentation of the note.  I have verified the history and personally performed the physical exam and medical decision making.  I agree with the assessment and plan of care as documented in the note.      My changes are included in the note below.     Gerardo Cuba MD  PGY3  Date of Service (when I saw the patient): 05/19/23    Date of Admission:  5/17/2023    Changes today:  - Transition to trickle tube feeds as tolerated - Nutrition closely following   - Fiber added to formula     Assessment & Plan   Sofie Rodriguez is a 62 year old female with PMH of  bilateral lung transplant 6/28/2022 for COPD c/b persistent b/l pleural effusions, post operative right phrenic palsy, ASCVD, EBV viremia,, chronic hypercapnea, gastroparesis s/p GJ tube, ESRD on HD, GI bleed 2/2 pyloric ulcer, hemobilia s/p ERCP admitted on 5/17/2023 with persistent diarrhea, weight loss, tube feed intolerance suspected osmotic diarrhea 2/2 malabsorption.     Diarrhea, osmotic   C. Diff colitis, improved   Severe gatroparesis s/p NG tube placement and EGD with botox  Feeding intolerance   Weight loss  Pt reports 5 weeks of diarrhea accompanied with 24 lb weight loss. Outpatient workup showed negative enteric panel, parasites, giardia CMV undetected on 5/12, EBV <35. Found to be c diff positive on 5/8 and initiated on vancomycin. Pt reports some symptomatic improvement but persistent diarrhrea, nausea, and lower abdominal discomfort associated with tube feeds only. No such symptoms with po intake. No improvement with recent formula change. AXR shows proper placement of GJ tube. Per GI, suspect osmotic diarrhea 2/2 malabsorption in context of gastroparesis. Discontinued vancomycin  due to low suspicion for c diff given diarrhea directly related to tube feeds.   - GI consulted, appreciate recommendations:     Awaiting fecal osmolarity, electrolytes    Consider doing 24 hours tube feeding to decrease osmotic load    Continue loperamide, can try 4 mg 30 minutes prior to the tube feeding, the additional 2 mg if having diarrhea (patient had constipation/nausea with 4 mg BID in the past)  - Nutrition consulted, appreciate recommendations:    Will start trickle (10 ml/hr) and advance by 10 ml/hr every 8 hours       Consider calorie counts once admitted to inpatient unit    Condense TF as patient tolerates with added fiber     Adjust to Creon w/ TF prior to discharge d/t insurance does not cover Relizorb.     Pancreatic insufficiency  Pt found to have pancreatic elastase pf 198 with slight to moderate panc insufficiency.    - Nutrition consulted, appreciate recommendations:     Recommend 2 capsules Creon 24 at each meal to cover oral intakes (provides 745 units lipase/kg/meal)  - PPI BID  - Lactobacillus daily    S/p bilateral Lung transplant 6/28/22  Persistent Left pleural effusion  Pt underwent b/l lung transplant 6/22 due to COPD c/b persistent bilateral pleural effusion. CMV 5/12 undetected, EBV <35. CXR reveals small left pleural effusion (unchanged from prior) with clear, hyperinflated lungs. WBC 2.2. Procal 3.4 but unreliable iso ESRD. CRP mildly elevated at 16. Influenza/RSV/Covid negative. VBG 7.37/53/64/31  - Pulm transplant consulted appreciate recs:  - Tacrolimus 3 mg qAM / 2.5 mg qPM.    - Imuran on hold since 5/8 for leukopenia  - Prednisone 5 mg qAM / 2.5 mg qPM  - Dapsone qMWF for PJP ppx  - CMV ppx not currently indicated, D+/R+, recent CMV (5/12) negative  - Continuous pulse ox  - Transplant pulmonary team consulted    Leukopenia  Neutropenia  WBC 2.2, was 4.2 two months ago. ANC 1.3 at admission iso current C.diff infection. Neutropenia etiology unclear with last white count 1.8  with ANC by report of 0.8 or 0.9 for which patient received Neupogen. Off azathioprine  - Transplant pulm consulted     ESRD on HD T/Th/Sat  Cr 5.14 on admission was 2.96 - 8.27 in past 2-3 months. On HD.   - Transplant nephrology consulted   - Dialysis T/Th/Sat    Troponinemia  Pt denies any chest pain, shortness of breath.Trop 167-->148, likely 2/2 demand ischemia iso CKD. EKG reviewed, no ST segment changes. T wave inversion noted in V4 (new from previous EKG).   - CTM       Diet: Advance Diet as Tolerated: Regular Diet Adult; Thin Liquids (level 0)  Adult Formula Drip Feeding: Continuous Other; ProtoStar Renal 1.8; Jejunostomy; Goal Rate: Start at 10 ml/hr and advance by 10 ml/hr every 8 hours as patient tolerates. Goal rate 40 ml/hr; mL/hr; Use Relizorb per orders    DVT Prophylaxis: Pneumatic Compression Devices  Dong Catheter: Not present  Lines: PRESENT      CVC Right Tunneled-Site Assessment: WDL      Cardiac Monitoring: None  Code Status: Full Code      Clinically Significant Risk Factors                                  Disposition Plan     Expected Discharge Date: 05/19/2023                The patient's care was discussed with the Attending Physician, Dr. Pantoja.    Aracely Lobato  Medicine Service, [facility] TEAM [address] [place] [address]  Securely message with [employer] (more info)  Text page via [facility] Paging/Directory   See signed in provider for up to date coverage information  ______________________________________________________________________    Interval History    Pt received first TF since Tuesday last night. Reports nausea, abdominal cramping, and 3 episodes of diarrhea after starting. Held TF after 2 hours. Pt also ate half of chicken sandwich and two orders of fries yesterday around 3 pm, resulted in some nausea with eventual resolution. Otherwise no new symptoms or new pain. Has been ambulating without difficulty. No SOB, lightheaded or dizziness.        Physical Exam   Vital Signs: Temp: 98  F (36.7  C) Temp src: Oral BP: 113/68 Pulse: 82   Resp: 18 SpO2: 95 % O2 Device: None (Room air) Oxygen Delivery: 2 LPM  Weight: 0 lbs 0 oz   Constitutional: awake, alert, cooperative, no apparent distress, and appears stated age  HENT:      Head: Normocephalic and atraumatic.      Right Ear: External ear normal.  Eyes: EOM intact, no icterus  Respiratory: No increased work of breathing, good air exchange, clear to auscultation bilaterally  Cardiovascular: RRR  GI: No scars, normal bowel sounds, soft, non-distended, non-tender, no masses palpated, no hepatosplenomegally. GJ tube in place       Data     I have personally reviewed the following data over the past 24 hrs:    Most Recent 3 CBC's:  Recent Labs   Lab Test 05/19/23  0700 05/18/23  0554 05/17/23  1657   WBC 2.4* 2.2* 2.2*   HGB 10.9* 10.7* 11.7   * 125* 124*    201 215     Most Recent 3 BMP's:  Recent Labs   Lab Test 05/19/23  1207 05/19/23  0700 05/18/23  1926 05/18/23  0554 05/17/23  1657   NA  --  132*  --  141 142   POTASSIUM  --  4.6  --  5.0 3.9   CHLORIDE  --  94*  --  103 103   CO2  --  29  --  28 28   BUN  --  14.3  --  18.8 18.0   CR  --  3.60*  --  5.79* 5.14*   ANIONGAP  --  9  --  10 11   ESTUARDO  --  9.3  --  9.5 9.7   * 92 141* 83 161*     Most Recent 2 LFT's:  Recent Labs   Lab Test 05/19/23  0700 05/18/23  0554   AST 22 26   ALT <5* 9*   ALKPHOS 56 48   BILITOTAL 0.6 0.6     Most Recent 3 Creatinines:  Recent Labs   Lab Test 05/19/23  0700 05/18/23  0554 05/17/23  1657   CR 3.60* 5.79* 5.14*     Most Recent 3 Hemoglobins:  Recent Labs   Lab Test 05/19/23  0700 05/18/23  0554 05/17/23  1657   HGB 10.9* 10.7* 11.7     Most Recent 6 Bacteria Isolates From Any Culture (See EPIC Reports for Culture Details):  Recent Labs   Lab Test 06/14/21  0939   CULT Culture negative for acid fast bacilli  Assayed at [facility] [employer] [address] [place] [address] [phone]  >10  Squamous epithelial cells/low power field indicates oral contamination. Please   recollect.  *  >10 Squamous epithelial cells/low power field indicates oral contamination. Please   recollect.  *     Most Recent Urinalysis:  Recent Labs   Lab Test 05/18/23  0627   COLOR Yellow   APPEARANCE Slightly Cloudy*   URINEGLC Negative   URINEBILI Negative   URINEKETONE Negative   SG 1.011   UBLD Negative   URINEPH 5.0   PROTEIN 30*   NITRITE Negative   LEUKEST Moderate*   RBCU 3*   WBCU 21*     Most Recent ESR & CRP:  Recent Labs   Lab Test 05/17/23  1657   SED 24   CRPI 16.00*             Imaging results reviewed over the past 24 hrs:   No results found for this or any previous visit (from the past 24 hour(s)).

## 2023-05-19 NOTE — PROGRESS NOTES
2040  Received pt AOX4, Independent came with Abn labs and diarrhea, last BM was yesterday, started TF at 340ml 4hr hang time, with Creon and Sodium Bicarb mixed in TF, pt requested to start at 80ml/hr as what she used to at home, due meds given, needs attended.

## 2023-05-19 NOTE — PLAN OF CARE
Assumed cares at [id number]     Status: Abn Labs, diarrhea  Neuro:  AOX4  GI/: On TF, supposed 85ml/hr but pt to ask 80ml/hr as pt used to, Eneida Stoll 1.8 4hr hang time 340ml with Creon and Sodium Bicarb mixed on the TF for 3hrs and c/o abdominal cramps, nausea and x2 diarrhea, stop the TF  1 formed BM  Resp: Not in distress, sating at RA  Mobility: Independent  Cardiac: Denies chest pain during assessment  Lines/Drains: PIV line saline locked  Pain: Denies pain during assessment  Skin: Scattered old bruises on rt arm     VS: /81 (BP Location: Right arm)   Pulse 88   Temp 98.1  F (36.7  C) (Oral)   Resp 18   SpO2 100%      Plan of Care:   -For Glucose check daily x 3days, if less than 150 the discharge the order

## 2023-05-19 NOTE — PROGRESS NOTES
CLINICAL NUTRITION SERVICES - BRIEF NOTE     Nutrition Prescription    RECOMMENDATIONS FOR MDs/PROVIDERS TO ORDER:  Discussed w/ provider - will adjust to continuous TF at slow rate and slow rate advancement pending patient tolerance. Will use Relizorb at this time due to gradual rate changes with plan to adjust back to Creon mixed with TF at discharge d/t lack of insurance coverage for Relizorb.   Will monitor PO. Based on patient history, do not anticipate patient can sustain adequate PO to maintain weight/nutrition status, especially with recent weight loss. However, patient can continue to eat as able and will monitor if decreases in total TF volume are appropriate.   Pt continues on fiber containing formula + fiber modulars TID.     Recommendations already ordered by Registered Dietitian (RD):    Adjust TF to: Sport Universal Process Renal 1.8 at 10 ml/hr, advance by 10 ml/hr every 8 hours as patient tolerates. Goal continuous feeds: Sport Universal Process Renal 1.8 @ 40 ml/hr  RELIZORB CARTRIDGES  *Change 1 cartridge every 24 hours with TF rate @ 10-20 ml/hr  *Change 1 cartridge every 12 hours with TF rate >20 ml/hr    *Supplies: Obtain cartridges in the unit med room or call [phone] and provide [employer]oft #[phone]    Continue regular diet.     Future/Additional Recommendations:    Consider calorie counts once admitted to inpatient unit    Condense TF as patient tolerates    Adjust to Creon w/ TF prior to discharge d/t insurance does not cover Relizorb.        New findings:   Pt did not tolerate TF overnight - abd cramping, nausea, diarrhea.  Discussed w/ primary team and patient, plan to try adjusting TF back to continuous and start at slow rate with gradual rate increase to see if patient can tolerate better than higher nocturnal rate. While doing continuous TF with slow advancement, will use Relizorb. Plan to adjust to Creon w/ open system at discharge d/t patient's insurance not covering Relizorb for discharge.     GI:    Last BM:  "05/19/23    Per GI - \"It is reasonable to try pancreatic enzyme replacement with her feedings, and to screen for celiac disease. It would also be reasonable to recheck her C Diff toxin\". Also per GI - \"Likely osmotic diarrhea. Could also be from pancreatic insufficiency (though unclear if she has chronic pancreatitis), but agree with trial of Creon.\"    Meds:    Vitamin B12 500 mcg    Nutrisource fiber 1 pkt TID    Lactobacillus rhamnosus    Creon 24 - 2 capsules with meals TID    Imodium 4x/day    protonix    Prednisone    Tacrolimus    Labs:     05/17/23 16:57 05/18/23 05:54 05/19/23 07:00   Sodium 142 141 132 (L)   Potassium 3.9 5.0 4.6   Chloride 103 103 94 (L)   Carbon Dioxide (CO2) 28 28 29   Urea Nitrogen 18.0 18.8 14.3   Creatinine 5.14 (H) 5.79 (H) 3.60 (H)   GFR Estimate 9 (L) 8 (L) 14 (L)   Calcium 9.7 9.5 9.3   Anion Gap 11 10 9   Magnesium 2.0     Phosphorus  3.6    Albumin 4.3 3.7 3.7   Protein Total 7.1 6.3 (L) 6.1 (L)   Alkaline Phosphatase 54 48 56   ALT 12 9 (L) <5 (L)   AST 27 26 22   Bilirubin Total 0.6 0.6 0.6   CRP Inflammation 16.00 (H)     Glucose 161 (H) 83 92       Renal:    HD       Implementation  Collaboration with other providers  Enteral Nutrition - Modify rate and schedule  Nutrition-related medication management       RD to follow per protocol      Christie Deras RDN, LD  6D/ED RD pager: [phone]  Weekend/Holiday RD pager: [phone]      "

## 2023-05-19 NOTE — PLAN OF CARE
Goal Outcome Evaluation:       Pt has been stable, denied any pain, VSS, independent with ADL including self med administration. Pt ordered late breakfast, consumed about 50%, denied any pain with food. TF started at 1300 running at 10 mL/hr. Next rate increase will be at 2100 by 10 mL. Pt seemed to tolerate TF well so far. Unable to send stool sample yet since pt did not have BM this shift. Continue to monitor.

## 2023-05-19 NOTE — PHARMACY-CONSULT NOTE
Pharmacy Tube Feeding Consult    Medication reviewed for administration by feeding tube and for potential food/drug interactions.    Recommendation: No changes are needed at this time.     Pharmacy will continue to follow as new medications are ordered.    Kavon Butler, PharmD, BCPS

## 2023-05-19 NOTE — PROGRESS NOTES
Pulmonary Medicine  Cystic Fibrosis - Lung Transplant Team  Progress Note  2023       Patient: Sofie Rodriguez  MRN: [id number]  : 1960 (age 62 year old)  Transplant: 2022 (Lung), POD#325  Admission date: 2023    Assessment & Plan:     Sofie Rodriguez is a 62 year old female with a history of COPD s/p BSLT (22) complicated by post-operative hemidiaphragm palsy, chronic hypercapnia, persistent pleural effusions, recurrent PNAs, positive DSA, EBV viremia, hypogammaglobulinemia, gastroparesis s/p G/J tube placement (22) and pyloric botox (23), GI bleed 2/2 pyloric ulcer, hemobilia s/p ERCP and MRCP, chronic diarrhea, recurrent C diff colitis, and ESRD (on iHD TTSa).  PMH also notable for HFpEF, ASCVD, NTM colonization, hep C, and methamphetamine use.  The patient was admitted on 23 for intolerance of tube feedings with weight loss, neutropenia, and failure to thrive.     Today's recommendations:  - ImmuKnow assay () pending  - Tacrolimus level supratherapeutic, dose decreased, repeat level to trend ordered   - Imuran remains on hold for leukopenia   - DSA () pending  - Stool studies (, ) pending per GI  - TF and pancreatic enzyme management per RD and GI  - Follow pending blood cultures ()    S/p bilateral sequential lung transplant (BSLT) for COPD:   Right hemidiaphragm palsy:   Nocturnal hypoxia: Pulmonary symptoms stable.  Recent bronch with BAL  with mild erythema t/o right, mild-moderate t/o left, and minimal secretions t/o.  Fungus on cytology, cultures otherwise negative.  RA during the day with 2L NC overnight (baseline).  - ImmuKnow assay () pending to assess total immune response given 2-drug IST and AMR in the setting of recurrent C diff  - 2L NC overnight      Immunosuppression:  - Tacrolimus 3 mg qAM / 2.5 mg qPM.  Goal level 8-12.  Last tacro level 12.7 on  (dose reduced).  Level 5/18 supratherapeutic at 16.1, decrease dose  to 2 mg BID, repeat level to trend 5/21 (ordered).  - Imuran on hold since 5/8 for leukopenia  - Prednisone 5 mg qAM / 2.5 mg qPM     Prophylaxis:   - Dapsone qMWF for PJP ppx  - CMV ppx not currently indicated, D+/R+, recent CMV (5/12) negative     Positive DSA: No prior treatment for AMR but has been a concern since pt. initially demonstrated rising DSA post-transplant.  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  Most recently 10,314 (4/18).  Also with h/o DR52 Ab (11/2-11/23/22, mfi 846-1072) and CW10 (10/19-12/9/22, mfi 527-1600).  - Repeat DSA (5/18) pending     Hypogammaglobulinemia: H/o prior IVIG infusions but most recent IgG adequate at 959 on 5/18.  Repeat as OP.    EBV viremia: Recently 3k in January, most recently <35 (5/12).  Repeat as OP.     Other relevant problems being managed by the primary team:     Severe malnutrition of chronic illness:  Gastroparesis s/p PEG/J and botox:   Pyloric ulcer:  Acute on chronic diarrhea:  Recurrent C diff colitis: Chronic diarrhea since transplant with recurrent episodes of C diff, most recently diagnosed on 5/9 and started on PO vancomycin.  Stool urgency and frequency persisting with tube feedings but decreased by about half when off cycled feeds.  Reports only tolerating about 1-2 hours of cycled tube feeds on average at home d/t nausea (no emesis), cramping, and diarrhea.  No improvement after formula change as OP on 4/18.  Very little PO intake otherwise, reporting similar symptoms in addition to early satiety.  Weight is down ~25# in the past month.  AXR confirmed G/J tube placement.  GI consulted 5/18, see their notes for details, PO vancomycin stopped given no improvement with medication/completed treatment (?colonization).  Calprotectin feces elevated at 210 on 5/18.  - Tissue transglutaminase Ab IgA (5/18) pending  - Electrolyte fecal (5/19) pending  - Osmolality stool (5/19) pending  - Routine parasitology exam  ordered pending collection  - Imodium 2 mg QID  - Pancreatic enzymes (h/o abnormal fecal elastase) per RD and GI  - TF per RD and GI     Leukopenia: Likely 2/2 medications but persisting despite holding Imuran on 5/8.  Will continue to hold, further management per primary.  - Blood cultures (5/17) NGTD    We appreciate the excellent care provided by the [place] [id number] team.  Recommendations communicated via in person and this note.  Will continue to follow along closely, please do not hesitate to call with any questions or concerns.    Patient discussed with Dr. Franks.    Yuliet Aviles, PA-C  [facility]patient [facility]  Pulmonary CF/Transplant     Subjective & Interval History:     Remains on RA during the day with 2L NC overnight (baseline) without acute pulmonary complaints.  Only able to run TF for ~2h last night due to abdominal cramping, nausea, and loose stools (x3, initially mushy --> loose/watery).  Nausea persists this morning.  No vomiting.  Minimal PO intake.      Review of Systems:     C: No fever, no chills  INTEGUMENTARY/SKIN: No rash or obvious new lesions  ENT/MOUTH: No sore throat, no sinus pain, no nasal congestion or drainage  RESP: See interval history  CV: No chest pain, no peripheral edema  GI: No reflux symptoms  : No dysuria, + oliguric   MUSCULOSKELETAL: No myalgias, no arthralgias  ENDOCRINE: Blood sugars with adequate control  NEURO: No headache, no numbness or tingling  PSYCHIATRIC: Mood stable    Physical Exam:     All notes, images, and labs from past 24 hours (at minimum) were reviewed.    Vital signs:  Temp: 98  F (36.7  C) Temp src: Oral BP: 113/68 Pulse: 82   Resp: 18 SpO2: 95 % O2 Device: None (Room air) Oxygen Delivery: 2 LPM      I/O:     Intake/Output Summary (Last 24 hours) at 5/19/2023 1349  Last data filed at 5/19/2023 1300  Gross per 24 hour   Intake 1220 ml   Output 1000 ml   Net 220 ml     Constitutional: Lying in bed, in no apparent distress.   HEENT: Eyes with pink conjunctivae,  anicteric.  Oral mucosa moist without lesions.   PULM: Good air flow bilaterally.  No crackles, no rhonchi, no wheezes.  Non-labored breathing on RA.  CV: Normal S1 and S2.  RRR.  + systolic murmur.  No peripheral edema.   ABD: Hyperactive BS, soft, nontender, nondistended.  PEG/J tube site intact, no drainage.  MSK: Moves all extremities.  + muscle wasting.   NEURO: Alert and conversant.   SKIN: Warm, dry.  No rash on limited exam.   PSYCH: Mood stable.     Data:     LABS    CMP:   Recent Labs   Lab 05/19/23  1207 05/19/23  0700 05/18/23  1926 05/18/23  0554 05/17/23  1657   NA  --  132*  --  141 142   POTASSIUM  --  4.6  --  5.0 3.9   CHLORIDE  --  94*  --  103 103   CO2  --  29  --  28 28   ANIONGAP  --  9  --  10 11   * 92 141* 83 161*   BUN  --  14.3  --  18.8 18.0   CR  --  3.60*  --  5.79* 5.14*   GFRESTIMATED  --  14*  --  8* 9*   ESTUARDO  --  9.3  --  9.5 9.7   MAG  --   --   --   --  2.0   PHOS  --   --   --  3.6  --    PROTTOTAL  --  6.1*  --  6.3* 7.1   ALBUMIN  --  3.7  --  3.7 4.3   BILITOTAL  --  0.6  --  0.6 0.6   ALKPHOS  --  56  --  48 54   AST  --  22  --  26 27   ALT  --  <5*  --  9* 12     CBC:   Recent Labs   Lab 05/19/23  0700 05/18/23  0554 05/17/23  1657   WBC 2.4* 2.2* 2.2*   RBC 2.84* 2.79* 3.04*   HGB 10.9* 10.7* 11.7   HCT 34.7* 34.8* 37.6   * 125* 124*   MCH 38.4* 38.4* 38.5*   MCHC 31.4* 30.7* 31.1*   RDW 16.7* 17.1* 17.3*    201 215       INR:   Recent Labs   Lab 05/17/23  1657   INR 0.98       Glucose:   Recent Labs   Lab 05/19/23  1207 05/19/23  0700 05/18/23  1926 05/18/23  0554 05/17/23  1657   * 92 141* 83 161*       Blood Gas:   Recent Labs   Lab 05/17/23  1703   HCO3V 31*       Culture Data No results for input(s): CULT in the last 168 hours.    Virology Data:   Lab Results   Component Value Date    FLUAH1 Not Detected 04/19/2023    FLUAH3 Not Detected 04/19/2023    AU4097 Not Detected 04/19/2023    IFLUB Not Detected 04/19/2023    RSVA Not Detected  04/19/2023    RSVB Not Detected 04/19/2023    PIV1 Not Detected 04/19/2023    PIV2 Not Detected 04/19/2023    PIV3 Not Detected 04/19/2023    HMPV Not Detected 04/19/2023       Historical CMV results (last 3 of prior testing):  Lab Results   Component Value Date    CMVQNT <137 (A) 04/19/2023    CMVQNT Not Detected 02/28/2023    CMVQNT Not Detected 01/24/2023     Lab Results   Component Value Date    CMVLOG <2.1 04/19/2023    CMVLOG 3.5 01/25/2023    CMVLOG <2.1 12/21/2022       Urine Studies    Recent Labs   Lab Test 05/18/23  0627 09/19/22  2254   URINEPH 5.0 7.0   NITRITE Negative Negative   LEUKEST Moderate* Large*   WBCU 21* 29*       Most Recent [place] Pulmonary Function Testing (FVC/FEV1 only)  FVC-Pre   Date Value Ref Range Status   02/28/2023 1.54 L    01/24/2023 1.56 L    12/21/2022 1.59 L    11/23/2022 1.44 L      FVC-%Pred-Pre   Date Value Ref Range Status   02/28/2023 54 %    01/24/2023 55 %    12/21/2022 56 %    11/23/2022 47 %      FEV1-Pre   Date Value Ref Range Status   02/28/2023 1.51 L    01/24/2023 1.50 L    12/21/2022 1.54 L    11/23/2022 1.41 L      FEV1-%Pred-Pre   Date Value Ref Range Status   02/28/2023 67 %    01/24/2023 66 %    12/21/2022 68 %    11/23/2022 58 %        IMAGING    Recent Results (from the past 48 hour(s))   XR Chest 2 Views    Narrative    EXAM: XR CHEST 2 VIEWS  5/17/2023 5:20 PM     HISTORY:  dyspnea, s/p lung transplant       COMPARISON:  Chest radiograph 2/28/2023    FINDINGS:   PA and lateral views of the upper chest. Right central venous  dual-lumen central venous catheter with tip projecting over the high  right atrium. Postoperative changes of lung transplantation with  clamshell sternotomy wires. Calcifications of the aortic knob. Trachea  is mildly deviated to the right. Unchanged left costophrenic angle  blunting. Right costophrenic angle is clear. No pneumothorax. . No  focal consolidative opacity Partially visualized upper abdomen is  unremarkable. No acute  osseous abnormality.      Impression    IMPRESSION:   Postoperative changes of prior bilateral lung transplantation.  Unchanged left pleural effusion/atelectasis. No acute airspace opacity    I have personally reviewed the examination and initial interpretation  and I agree with the findings.    NIKOS JAVED MD         SYSTEM ID:  [id number]   XR Abdomen Port 1 View    Narrative    Exam: XR ABDOMEN PORT 1 VIEW, 5/17/2023 10:14 PM    Indication: Pt with active C. diff. Please assess GJ tube placement    Comparison: 11/23/2022    Findings:   Single portable AP supine radiograph of the abdomen. Percutaneous  gastrojejunostomy tube in place with the balloon inflated in the left  lower quadrant in similar position as comparison radiograph and likely  projecting over the stomach with distal end of the tube tip projecting  over the expected location of the jejunum. Partially visualized  central venous catheter tip projects over the right atrium. Clamshell  sternotomy wires.    No obstructive bowel gas pattern. No pneumatosis or portal venous gas.  Lung bases are clear. No acute osseous abnormality.      Impression    Impression:  Percutaneous GJ balloon projects over the left lower  quadrant in expected location of the stomach, distal end of the tube  tip projects over the expected location of the jejunum.    I have personally reviewed the examination and initial interpretation  and I agree with the findings.    NIKOS JAVED MD         SYSTEM ID:  [id number]

## 2023-05-19 NOTE — PROVIDER NOTIFICATION
Paged the team, TF was stopped, pt c/o nausea, abd cramps and x1 diarrhea. Any order, pls advise,      Received a call back: Ok to Hold

## 2023-05-19 NOTE — PROGRESS NOTES
Pt requesting to position her on supine while on TF, which according to the pt, she's taking her TF on supine position at home most of the time: Educate the pt to keep on upright position not to increase the risk of aspiration.

## 2023-05-20 LAB
ANION GAP SERPL CALCULATED.3IONS-SCNC: 16 MMOL/L (ref 7–15)
BUN SERPL-MCNC: 22 MG/DL (ref 8–23)
CALCIUM SERPL-MCNC: 8.9 MG/DL (ref 8.8–10.2)
CHLORIDE SERPL-SCNC: 103 MMOL/L (ref 98–107)
CREAT SERPL-MCNC: 4.84 MG/DL (ref 0.51–0.95)
DEPRECATED HCO3 PLAS-SCNC: 27 MMOL/L (ref 22–29)
ERYTHROCYTE [DISTWIDTH] IN BLOOD BY AUTOMATED COUNT: 16.6 % (ref 10–15)
GFR SERPL CREATININE-BSD FRML MDRD: 10 ML/MIN/1.73M2
GLUCOSE BLDC GLUCOMTR-MCNC: 136 MG/DL (ref 70–99)
GLUCOSE SERPL-MCNC: 126 MG/DL (ref 70–99)
HCT VFR BLD AUTO: 32.2 % (ref 35–47)
HGB BLD-MCNC: 10.1 G/DL (ref 11.7–15.7)
LACTATE SERPL-SCNC: 1 MMOL/L (ref 0.7–2)
MCH RBC QN AUTO: 38.4 PG (ref 26.5–33)
MCHC RBC AUTO-ENTMCNC: 31.4 G/DL (ref 31.5–36.5)
MCV RBC AUTO: 122 FL (ref 78–100)
PLATELET # BLD AUTO: 190 10E3/UL (ref 150–450)
POTASSIUM SERPL-SCNC: 3.9 MMOL/L (ref 3.4–5.3)
RBC # BLD AUTO: 2.63 10E6/UL (ref 3.8–5.2)
SODIUM SERPL-SCNC: 146 MMOL/L (ref 136–145)
WBC # BLD AUTO: 2.4 10E3/UL (ref 4–11)

## 2023-05-20 PROCEDURE — 36415 COLL VENOUS BLD VENIPUNCTURE: CPT | Performed by: HOSPITALIST

## 2023-05-20 PROCEDURE — 85027 COMPLETE CBC AUTOMATED: CPT | Performed by: STUDENT IN AN ORGANIZED HEALTH CARE EDUCATION/TRAINING PROGRAM

## 2023-05-20 PROCEDURE — 250N000013 HC RX MED GY IP 250 OP 250 PS 637

## 2023-05-20 PROCEDURE — 250N000013 HC RX MED GY IP 250 OP 250 PS 637: Performed by: STUDENT IN AN ORGANIZED HEALTH CARE EDUCATION/TRAINING PROGRAM

## 2023-05-20 PROCEDURE — 99232 SBSQ HOSP IP/OBS MODERATE 35: CPT | Performed by: HOSPITALIST

## 2023-05-20 PROCEDURE — 80048 BASIC METABOLIC PNL TOTAL CA: CPT | Performed by: STUDENT IN AN ORGANIZED HEALTH CARE EDUCATION/TRAINING PROGRAM

## 2023-05-20 PROCEDURE — 90937 HEMODIALYSIS REPEATED EVAL: CPT

## 2023-05-20 PROCEDURE — 250N000012 HC RX MED GY IP 250 OP 636 PS 637

## 2023-05-20 PROCEDURE — 99233 SBSQ HOSP IP/OBS HIGH 50: CPT | Performed by: INTERNAL MEDICINE

## 2023-05-20 PROCEDURE — 214N000001 HC R&B CCU UMMC

## 2023-05-20 PROCEDURE — 250N000012 HC RX MED GY IP 250 OP 636 PS 637: Performed by: PHYSICIAN ASSISTANT

## 2023-05-20 PROCEDURE — 83605 ASSAY OF LACTIC ACID: CPT | Performed by: HOSPITALIST

## 2023-05-20 PROCEDURE — 87209 SMEAR COMPLEX STAIN: CPT | Performed by: INTERNAL MEDICINE

## 2023-05-20 PROCEDURE — 258N000003 HC RX IP 258 OP 636: Performed by: HOSPITALIST

## 2023-05-20 RX ADMIN — Medication 500 MCG: at 08:00

## 2023-05-20 RX ADMIN — PANCRELIPASE 2 CAPSULE: 24000; 76000; 120000 CAPSULE, DELAYED RELEASE PELLETS ORAL at 18:03

## 2023-05-20 RX ADMIN — Medication 40 MG: at 08:01

## 2023-05-20 RX ADMIN — TACROLIMUS 2 MG: 5 CAPSULE ORAL at 07:58

## 2023-05-20 RX ADMIN — SODIUM CHLORIDE 250 ML: 9 INJECTION, SOLUTION INTRAVENOUS at 13:33

## 2023-05-20 RX ADMIN — Medication 1 PACKET: at 07:59

## 2023-05-20 RX ADMIN — Medication 1 CAPSULE: at 20:31

## 2023-05-20 RX ADMIN — Medication 1 PACKET: at 20:27

## 2023-05-20 RX ADMIN — SODIUM CHLORIDE 300 ML: 9 INJECTION, SOLUTION INTRAVENOUS at 13:33

## 2023-05-20 RX ADMIN — Medication 40 MG: at 20:33

## 2023-05-20 RX ADMIN — Medication 1 PACKET: at 12:12

## 2023-05-20 RX ADMIN — LOPERAMIDE HCL 2 MG: 1 SOLUTION ORAL at 07:58

## 2023-05-20 RX ADMIN — LOPERAMIDE HCL 2 MG: 1 SOLUTION ORAL at 20:33

## 2023-05-20 RX ADMIN — PREDNISONE 5 MG: 5 TABLET ORAL at 08:00

## 2023-05-20 RX ADMIN — METOPROLOL TARTRATE 25 MG: 25 TABLET, FILM COATED ORAL at 20:28

## 2023-05-20 RX ADMIN — PREDNISONE 2.5 MG: 2.5 TABLET ORAL at 20:28

## 2023-05-20 RX ADMIN — Medication 1 CAPSULE: at 08:00

## 2023-05-20 RX ADMIN — TACROLIMUS 2 MG: 5 CAPSULE ORAL at 20:32

## 2023-05-20 RX ADMIN — METOPROLOL TARTRATE 25 MG: 25 TABLET, FILM COATED ORAL at 08:00

## 2023-05-20 RX ADMIN — Medication: at 13:34

## 2023-05-20 ASSESSMENT — ACTIVITIES OF DAILY LIVING (ADL)
ADLS_ACUITY_SCORE: 20

## 2023-05-20 NOTE — PHARMACY-ADMISSION MEDICATION HISTORY
Pharmacist Admission Medication History    Admission medication history is complete. The information provided in this note is only as accurate as the sources available at the time of the update.    Medication reconciliation/reorder completed by provider prior to medication history? Yes    Information Source(s): Patient and CareEverywhere/SureScripts via phone    Pertinent Information:     Patient manages medications independently. All medications are administered via feeding tube.    Patient was using PRN loperamide about once every morning and every night (2x/day).    Changes made to PTA medication list:    Added: None    Deleted: None    Changed:   o Protein packet from daily to daily at 2 PM  o Updated PRN reason for glucose tablets to hypoglycemia (BG <70)    Allergies reviewed with patient and updates made in EHR: yes    Medication History Completed By: Eugenia Rosales RPH 5/20/2023 11:36 AM    Prior to Admission medications    Medication Sig Last Dose Taking? Auth Provider End Date   azaTHIOprine (IMURAN) 5 mg/mL SUSP 20 mLs (100 mg) by Per J Tube route daily ON HOLD 5/8/2023 FOR LOW WBC 5/8/2023 at am Yes Kaylin Triana PA-C    calcium carbonate 600 mg-vitamin D 400 units (CALTRATE) 600-400 MG-UNIT per tablet 1 tablet by Per J Tube route 2 times daily (with meals) 5/17/2023 at am Yes Claribel Franks MD    cyanocobalamin (CYANOCOBALAMIN) 500 MCG tablet 1 tablet (500 mcg) by Per Feeding Tube route daily 5/17/2023 at am Yes Greg Pritchard MD    dapsone 2 mg/mL SUSP 25 mLs (50 mg) by Per J Tube route Every Mon, Wed, Fri Morning 5/17/2023 at am Yes Greg Pritchard MD    Guar Gum (FIBER MODULAR, NUTRISOURCE FIBER,) packet 1 packet by Per Feeding Tube route 3 times daily (with meals) 5/17/2023 at am Yes Kaylin Triana PA-C    loperamide (IMODIUM A-D) 2 MG tablet 1 tablet (2 mg) by Per J Tube route 4 times daily as needed for diarrhea 5/17/2023 at am Yes Nguyen Johnson, CNP     metoprolol tartrate (LOPRESSOR) 50 MG tablet 0.5 tablets (25 mg) by Per Feeding Tube route 2 times daily 5/17/2023 at am Yes Claribel Franks MD    Nutritional Supplements (GLUCOSE MANAGEMENT) TABS Take 3-4 tablets by mouth as needed (hypoglycemia) Pharmacist may change instructions to fit whatever glucose tab product they have in stock. Unknown at prn Yes Kaylin Triana PA-C    pantoprazole (PROTONIX) 2 mg/mL SUSP suspension 20 mLs (40 mg) by Per J Tube route 2 times daily 5/17/2023 at am Yes Claribel Franks MD    predniSONE (DELTASONE) 5 MG tablet Take 1 tab (5mg) at AM and 1/2 tab (2.5) in pm 5/17/2023 at am Yes Claribel Franks MD    protein modular (PROSOURCE TF) LIQD 1 packet by Per Feeding Tube route daily at 2 PM 5/16/2023 at 1400 Yes Claribel Franks MD    tacrolimus (GENERIC EQUIVALENT) 1 mg/mL suspension Take 3 mLs (3 mg) by mouth every morning AND 2.5 mLs (2.5 mg) every evening. Total dose: 3mg in AM and 2.5mg in PM 5/17/2023 at am Yes Kaylin Triana PA-C    vancomycin (FIRVANQ) 50 MG/ML oral solution Take 2.5 mLs (125 mg) by mouth 4 times daily for 10 days, THEN 2.5 mLs (125 mg) 2 times daily for 7 days, THEN 2.5 mLs (125 mg) daily for 7 days, THEN 2.5 mLs (125 mg) every other day for 14 days. 5/17/2023 at 1200 Yes Kaylin Triana PA-C 6/16/23

## 2023-05-20 NOTE — PLAN OF CARE
Outcome Evaluation: VSS, afebrile. Denies pain. C/o cramping and nauseous feeling that has improved since dialysis. Main goals for today included: dialysis, increasing tube feeding to goal rate, stool sample. Approx 1kg weight off at dialyisis, tolerated well. Patient was to have tube feeding increased at 1300, however, felt abdominal fullness and hesitant to increase. After dialysis, this feeling improved and patient refused titration until after eating dinner. Stool sample has been collected and pending result.    Neuro: A&Ox4. Able to make needs known.  Cardiac: Radial pulse regular. VSS. No EKG monitoring ordered.  Respiratory: Sating >92% on RA. Uses oxygen at night up to 2L. Continuous pulse oximetry.  GI/: Adequate urine output. BM X1  Diet/appetite: Tolerating regular diet. Eating well. Tolerating tube feeding at 30mL/hr with 30mL free water flushed Q4H. Abdominal fullness has given patient hesitancy to increase rate to goal rate. Able to increase anytime overnight,  Activity:  Independent assist. Up to bathroom and walking in room. Performed own ADLs when given the items to do so  Pain: At acceptable level on current regimen.   Skin: No new deficits noted.  LDA's: CVC dialysis line, PIV, GJ tube.  Psychosocial: WDL. Pleasant.    Problem: Oral Intake Inadequate  Goal: Improved Oral Intake  5/20/2023 1732 by Beulah Vidal, RN  Outcome: Not Progressing    Goal Outcome Evaluation:      Plan of Care Reviewed With: patient    Overall Patient Progress: improvingOverall Patient Progress: improving

## 2023-05-20 NOTE — PLAN OF CARE
Shift: [id number]        Team: Chris Royal  Neuro: A&Ox4, denies numbness and tingling  Resp: >95% on 2L nasal canula   Cardiac: No tele, afebrile, palpable pulses, vital signs stable   GI/: Aneuric on hemodialysis, no bowel movement this shift  Skin: Bruising on R arm   Activity: Independent   LDA's: GJ tube, G tube clamped, J tube running tube feeds at 30 mL/hr being advanced to goal rate of 40 mL/hr every 8 hours by 10 mL/hr. Next advance due at 1300. Q4 30 mL free water flush. HD fistula on L arm, HD line R chest, R PIV saline locked   Diet: Regular     Dialysis on Tuesday, Thursday, and Saturday     Trigger Sepsis: vitally stable, Lactic Acid 1.0    Plan: Dialysis today. Continue plan of care and notify team with changes.

## 2023-05-20 NOTE — PROGRESS NOTES
HEMODIALYSIS TREATMENT NOTE    Date: 5/20/2023  Time: 1:38 PM    Data:  Pre Wt: 54.5 kg  Desired Wt: 53.5 kg  Post Wt:  53.5 kg  Weight change: 1 kg  Ultrafiltration - Post Run Net Total Removed (mL): 1000 mL  Vascular Access Status: patent  Dialyzer Rinse: Streaked  Total Blood Volume Processed: 67.71 Liters  Total Dialysis (Treatment) Time: 3 Hours    Lab:   No    Interventions/Assessment:  Received on bed, A/Ox4, denied pain, No SOB  Pt. Dialyzed for 3 hrs via (clean,dry and intact) CVC, UF set for 1L, BFR:400, DFR:600, Dialysate bath K:3, ac:2.25  CVC lumen locked with saline an changed cap guard  Pt.tolearted HD well  Handoff report given to PCN     Plan:    Per renal team

## 2023-05-20 NOTE — PROGRESS NOTES
Nephrology Progress  May 19, 2023        ASSESSMENT AND RECOMMENDATIONS:   Sofie Rodriguez is a 62 year old female with PMH of COPD s/p b/l lung transplant (6/2022) c/b persistent b/l pleural effusions, recurrent c diff colitis, post operative right phrenic palsy, ASCVD, EBV viremia, Hepatitic C, chronic hypercapnea, gastroparesis s/p GJ tube, ESRD on HD, GI bleed 2/2 pyloric ulcer, hemobilia s/p ERCP and MRCP, admitted with C diff, weight loss, and failure to thrive.    # ESKD: dialyzes TTS at [facility] with Dr. Fairbanks. Access: TDC, had LUE AVF placed 2/17/23, transposition surgery scheduled 6/9/23). EDW 56.1 kg (though recent wt of 54.2 kg). Run time: 3 hrs  - Dialysis today per TTS schedule    # BP/volume: has been losing wt, EDW 54.2 kg most recently; normotensive on metoprolol 25 mg bid; ongoing c-diff diarrheal losses, poor PO intake  - will attempt gentle UF given e/o overload with persistent L pleural effusion      # Anemia of CKD: hgb 10's; on mirera 50 mcg f3trgla, has just completed iron loading  - no indication for JOCELINE, hgb > 10    # BMD: Ca normal, alb 3.7, phos 3.6 a few days ago, will check a couple of times a week, recent   - on regular diet    Recommendations were communicated to primary team via this note    Rayna Bear Boland MD   Division of Renal Disease and Hypertension  [phone]        HISTORY OF PRESENT ILLNESS:  Sofie Rodriguez is a 62 year old female with PMH of COPD s/p b/l lung transplant (6/2022) c/b persistent b/l pleural effusions, recurrent c diff colitis, post operative right phrenic palsy, ASCVD, EBV viremia, Hepatitic C, chronic hypercapnea, gastroparesis s/p GJ tube, ESRD on HD, GI bleed 2/2 pyloric ulcer, hemobilia s/p ERCP and MRCP, admitted with C diff, weight loss, and failure to thrive. She is seen on dialysis. Pt reports she was on tube feeds until 5 weeks ago when they were stopped as she was not tolerating and having severe abd pain, nausea,  diarrhea due to her gastroparesis. They are considering a gastric stimulator. 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     Hepatitis 2017    Hep C, [facility]     HTN (hypertension)      Lung infection 11/30/2022     Osteopenia        Past Surgical History:   Procedure Laterality Date     BRONCHOSCOPY (RIGID OR FLEXIBLE), DIAGNOSTIC N/A 8/2/2022    Procedure: BRONCHOSCOPY, DIAGNOSTIC- inspection Bronch;  Surgeon: Kamala Lovell MD;  [facility]     BRONCHOSCOPY (RIGID OR FLEXIBLE), DIAGNOSTIC N/A 9/13/2022    Procedure: INSPECTION BRONCHOSCOPY, WITH BRONCHOALVEOLAR LAVAGE;  Surgeon: Jose R Mccullough MD;  [facility]     BRONCHOSCOPY (RIGID OR FLEXIBLE), DIAGNOSTIC N/A 11/9/2022    Procedure: BRONCHOSCOPY, WITH BRONCHOALVEOLAR LAVAGE AND BIOPSY;  Surgeon: Cesar Lima MD;  [facility]     BRONCHOSCOPY (RIGID OR FLEXIBLE), DIAGNOSTIC N/A 1/25/2023    Procedure: BRONCHOSCOPY, WITH BRONCHOALVEOLAR LAVAGE AND BIOPSY;  Surgeon: Mason Reddy MD;  [facility]     BRONCHOSCOPY (RIGID OR FLEXIBLE), DIAGNOSTIC N/A 4/19/2023    Procedure: BRONCHOSCOPY, WITH BRONCHOALVEOLAR LAVAGE AND BIOPSY;  Surgeon: Kamala Lovell MD;  [facility]     BRONCHOSCOPY FLEXIBLE AND RIGID N/A 07/19/2022    Procedure: BRONCHOSCOPY inspection only;  Surgeon: Bob Liao MD;  Location:  GI     COLONOSCOPY  2015     CV CORONARY ANGIOGRAM N/A 06/30/2021    Procedure: CV CORONARY ANGIOGRAM;  Surgeon: Alexander Cuellar MD;  Location:  HEART CARDIAC CATH LAB     CV RIGHT  HEART CATH MEASUREMENTS RECORDED N/A 06/30/2021    Procedure: CV RIGHT HEART CATH;  Surgeon: Alexander Cuellar MD;  Location:  HEART CARDIAC CATH LAB     ENDOSCOPIC RETROGRADE CHOLANGIOPANCREATOGRAM N/A 8/11/2022    Procedure: ENDOSCOPIC RETROGRADE CHOLANGIOPANCREATOGRAPHY WITH PANCREATIC DUCT NEEDLE KNIFE AND STENT PLACEMENT, BILE DUCT SPHINCTEROTOMY, BLOOD/DEBRIS REMOVAL AND STENT PLACEMENT;  Surgeon: Cosmo Arroyo MD;  [facility]     ENDOSCOPIC RETROGRADE CHOLANGIOPANCREATOGRAM N/A 10/7/2022    Procedure: ENDOSCOPIC RETROGRADE CHOLANGIOPANCREATOGRAPHY with biliary and pancreatic stent removal, debris removal;  Surgeon: Cosmo Arroyo MD;  Location:  OR     ENT SURGERY  1974    tonsillectomy     ENTEROSCOPY SMALL BOWEL N/A 8/11/2022    Procedure: SMALL BOWEL ENTEROSCOPY;  Surgeon: Cosmo Arroyo MD;  Location:  OR     ESOPHAGOGASTRODUODENOSCOPY, WITH NASOGASTRIC TUBE INSERTION N/A 07/01/2022    Procedure: ESOPHAGOGASTRODUODENOSCOPY, WITH NASOJEJUNAL TUBE INSERTION;  Surgeon: Ozzy Nickerson MD;  Location:  GI     ESOPHAGOSCOPY, GASTROSCOPY, DUODENOSCOPY (EGD), COMBINED N/A 8/3/2022    Procedure: ESOPHAGOGASTRODUODENOSCOPY (EGD);  Surgeon: Ira Andres MD;  Location:  GI     ESOPHAGOSCOPY, GASTROSCOPY, DUODENOSCOPY (EGD), COMBINED N/A 1/25/2023    Procedure: ESOPHAGOGASTRODUODENOSCOPY (EGD) with botox injection;  Surgeon: Gonzalez Navarro MD;  Location:  GI     HAND SURGERY       INSERT CHEST TUBE Right 9/13/2022    Procedure: Insert chest tube;  Surgeon: Jose R Mccullough MD;  Location:  GI     IR CVC TUNNEL PLACEMENT > 5 YRS OF AGE  9/26/2022     IR GASTRO JEJUNOSTOMY TUBE CHANGE  8/31/2022     IR GASTRO JEJUNOSTOMY TUBE CHANGE  12/21/2022     IR GASTRO JEJUNOSTOMY TUBE PLACEMENT  7/27/2022     IR THORACENTESIS  8/29/2022     LEEP TX, CERVICAL  04/07/2017    HECTOR III     LYMPH NODE BIOPSY Left 2005    Left axilla, benign- Sartell     MIDLINE  INSERTION - DOUBLE LUMEN Left 07/28/2022    20cm, Basilic vein     REPLACE GASTROJEJUNOSTOMY TUBE, PERCUTANEOUS  10/7/2022    Procedure: Replace Gastrojejunostomy Tube;  Surgeon: Cosmo Arroyo MD;  [facility]     THORACENTESIS Left 8/29/2022    Procedure: THORACENTESIS;  Surgeon: Bo Capone PA-C;  Location: [facility] OR     THORACENTESIS Left 9/13/2022    Procedure: Thoracentesis;  Surgeon: Jose R Mccullough MD;  [facility]     THROMBECTOMY UPPER EXTREMITY Left 07/02/2022    Procedure: LEFT RADIAL ARM THROMBECTOMY;  Surgeon: Christie Graham MD;  [facility]     TRANSPLANT LUNG RECIPIENT SINGLE X2 Bilateral 06/28/2022    Procedure: Clamshell Incision, Bilateral Sequential Lung Transplant, On Cardiopulmonary Bypass, Flexible Bronchoscopy;  Surgeon: Sue Sunshine MD;  [facility]        MEDICATIONS:  PTA Meds  Prior to Admission medications    Medication Sig Last Dose Taking? Auth Provider Long Term End Date   alcohol swab prep pads Use to swab area of injection/amos as directed.   Susan Delacruz MD     azaTHIOprine (IMURAN) 5 mg/mL SUSP 20 mLs (100 mg) by Per J Tube route daily ON HOLD 5/8/2023 FOR LOW WBC   Kaylin Triana PA-C Yes    blood glucose (CONTOUR NEXT TEST) test strip Use to test blood sugar 4 times daily, as directed. Okay to use whatever brand insurance will cover.   Kaylin Triana PA-C     blood glucose (NO BRAND SPECIFIED) lancets standard Use to test blood sugar 4 times daily as directed. Okay to use whatever brand insurance will cover.   Kaylni Triana PA-C     blood glucose monitoring (CONTOUR NEXT MONITOR W/DEVICE KIT) meter device kit Use to test blood sugar 4 times daily or as directed.   Susan Delacruz MD     calcium carbonate 600 mg-vitamin D 400 units (CALTRATE) 600-400 MG-UNIT per tablet 1 tablet by Per J Tube route 2 times daily (with meals)   Claribel Franks MD     cyanocobalamin (CYANOCOBALAMIN) 500 MCG  tablet 1 tablet (500 mcg) by Per Feeding Tube route daily   Greg Pritchard MD     dapsone 2 mg/mL SUSP 25 mLs (50 mg) by Per J Tube route Every Mon, Wed, Fri Morning   Greg Pritchard MD No    Guar Gum (FIBER MODULAR, NUTRISOURCE FIBER,) packet 1 packet by Per Feeding Tube route 3 times daily (with meals)   Kaylin Triana PA-C     insulin pen needle (32G X 4 MM) 32G X 4 MM miscellaneous Use 4 pen needles daily or as directed.   Claribel Franks MD     loperamide (IMODIUM A-D) 2 MG tablet 1 tablet (2 mg) by Per J Tube route 4 times daily as needed for diarrhea   Nguyen Johnson, CNP     metoprolol tartrate (LOPRESSOR) 50 MG tablet 0.5 tablets (25 mg) by Per Feeding Tube route 2 times daily   Claribel Franks MD Yes    Nutritional Supplements (GLUCOSE MANAGEMENT) TABS Take 3-4 tablets by mouth as needed (hypoglycemia) Pharmacist may change instructions to fit whatever glucose tab product they have in stock.   Kaylin Triana PA-C     pantoprazole (PROTONIX) 2 mg/mL SUSP suspension 20 mLs (40 mg) by Per J Tube route 2 times daily   Claribel Franks MD     predniSONE (DELTASONE) 5 MG tablet Take 1 tab (5mg) at AM and 1/2 tab (2.5) in pm   Claribel Franks MD No    protein modular (PROSOURCE TF) LIQD 1 packet by Per Feeding Tube route daily   Claribel Franks MD     Sharps Container MISC 1 sharps container   Claribel Franks MD     tacrolimus (GENERIC EQUIVALENT) 1 mg/mL suspension Take 3 mLs (3 mg) by mouth every morning AND 2.5 mLs (2.5 mg) every evening. Total dose: 3mg in AM and 2.5mg in PM   Kaylin Triana PA-C Yes    vancomycin (FIRVANQ) 50 MG/ML oral solution Take 2.5 mLs (125 mg) by mouth 4 times daily for 10 days, THEN 2.5 mLs (125 mg) 2 times daily for 7 days, THEN 2.5 mLs (125 mg) daily for 7 days, THEN 2.5 mLs (125 mg) every other day for 14 days.   Kaylin Triana PA-C  6/16/23   albuterol (PROAIR HFA/PROVENTIL HFA/VENTOLIN HFA) 108 (90 BASE) MCG/ACT Inhaler Inhale 2  puffs into the lungs every 6 hours as needed for shortness of breath / dyspnea or wheezing   Reported, Patient Yes 8/15/22   furosemide (LASIX) 20 MG tablet Take 1 tablet (20 mg) by mouth daily   Kaylin Triana PA-C Yes 10/8/22   insulin glargine (LANTUS PEN) 100 UNIT/ML pen Inject 7 Units Subcutaneous 2 times daily   Susan Delacruz MD Yes 10/8/22   ipratropium - albuterol 0.5 mg/2.5 mg/3 mL (DUONEB) 0.5-2.5 (3) MG/3ML neb solution Inhale 1 vial into the lungs every 4 hours as needed for shortness of breath / dyspnea   Unknown, Entered By History Yes 8/15/22   mycophenolate (GENERIC EQUIVALENT) 200 MG/ML suspension 3.75 mLs (750 mg) by Per J Tube route 2 times daily   Claribel Franks MD Yes 10/8/22   valGANciclovir (VALCYTE) 50 MG/ML solution 9 mLs (450 mg) by Oral or Feeding Tube route three times a week  Patient taking differently: 450 mg by Oral or Feeding Tube route three times a week Take on Mon/Wed/Fri   Claribel Franks MD Yes 10/8/22      Current Meds    cyanocobalamin  500 mcg Per Feeding Tube Daily     dapsone  50 mg Per J Tube Q Mon Wed Fri AM     fiber modular (NUTRISOURCE FIBER)  1 packet Per Feeding Tube TID w/meals     lactobacillus rhamnosus (GG)  1 capsule Oral BID     lipase-protease-amylase  2 capsule Oral TID w/meals     loperamide  2 mg Per J Tube 4x Daily     metoprolol tartrate  25 mg Per Feeding Tube BID     pantoprazole  40 mg Per J Tube BID     predniSONE  2.5 mg Oral or Feeding Tube QPM     predniSONE  5 mg Oral QAM     sodium chloride (PF)  3 mL Intracatheter Q8H     sodium chloride (PF)  9 mL Intracatheter During Dialysis/CRRT (from stock)     sodium chloride (PF)  9 mL Intracatheter During Dialysis/CRRT (from stock)     tacrolimus  2 mg Oral or Feeding Tube BID IS     Infusion Meds    dextrose       dextrose         ALLERGIES:    Allergies   Allergen Reactions     Blood Transfusion Related (Informational Only) Other (See Comments)     Patient has a history of a  clinically significant antibody against RBC antigens.  A delay in compatible RBCs may occur.        REVIEW OF SYSTEMS:  A comprehensive of systems was negative except as noted above.    SOCIAL HISTORY:   Social History     Socioeconomic History     Marital status: Single     Spouse name: Not on file     Number of children: Not on file     Years of education: Not on file     Highest education level: Not on file   Occupational History     Not on file   Tobacco Use     Smoking status: Former     Years: 30.00     Types: Cigarettes     Quit date: 2020     Years since quittin.5     Smokeless tobacco: Never   Vaping Use     Vaping status: Not on file   Substance and Sexual Activity     Alcohol use: Not Currently     Drug use: Not Currently     Types: Marijuana, Methamphetamines     Comment: hx:marijuana and methamphetamine-quit both unsure ?  2-3 yrs ago     Sexual activity: Not on file   Other Topics Concern     Parent/sibling w/ CABG, MI or angioplasty before 65F 55M? Not Asked   Social History Narrative     Not on file     Social Determinants of Health     Financial Resource Strain: Not on file   Food Insecurity: Not on file   Transportation Needs: Not on file   Physical Activity: Not on file   Stress: Not on file   Social Connections: Not on file   Intimate Partner Violence: Not on file   Housing Stability: Not on file     Reviewed with patient     FAMILY MEDICAL HISTORY:     No known family history of kidney disease  Reviewed with patient     PHYSICAL EXAM:   Temp  Av.6  F (37  C)  Min: 98.4  F (36.9  C)  Max: 98.8  F (37.1  C)      Pulse  Av.2  Min: 76  Max: 88 Resp  Av  Min: 16  Max: 16  SpO2  Av.4 %  Min: 96 %  Max: 98 %       /73   Pulse 80   Temp 97.5  F (36.4  C) (Oral)   Resp 20   Wt 54.5 kg (120 lb 1.6 oz)   SpO2 96%   BMI 21.97 kg/m        Admit       GENERAL APPEARANCE: alert, NAD  EYES: no scleral icterus, pupils equal  Pulmonary: CTA  CV: RRR   - Edema none  GI:  soft   MS: no evidence of inflammation in joints, no muscle tenderness  : no montgomery  SKIN: no rash, warm, dry, no cyanosis  NEURO: face symmetric, a/o3  Access: [facility]    LABS:   I have reviewed the following labs:  CMP  Recent Labs   Lab 05/20/23  0916 05/20/23  0601 05/19/23  1207 05/19/23  0700 05/18/23  1926 05/18/23  0554 05/17/23  1657   NA  --  146*  --  132*  --  141 142   POTASSIUM  --  3.9  --  4.6  --  5.0 3.9   CHLORIDE  --  103  --  94*  --  103 103   CO2  --  27  --  29  --  28 28   ANIONGAP  --  16*  --  9  --  10 11   * 126* 126* 92   < > 83 161*   BUN  --  22.0  --  14.3  --  18.8 18.0   CR  --  4.84*  --  3.60*  --  5.79* 5.14*   GFRESTIMATED  --  10*  --  14*  --  8* 9*   ESTUARDO  --  8.9  --  9.3  --  9.5 9.7   MAG  --   --   --   --   --   --  2.0   PHOS  --   --   --   --   --  3.6  --    PROTTOTAL  --   --   --  6.1*  --  6.3* 7.1   ALBUMIN  --   --   --  3.7  --  3.7 4.3   BILITOTAL  --   --   --  0.6  --  0.6 0.6   ALKPHOS  --   --   --  56  --  48 54   AST  --   --   --  22  --  26 27   ALT  --   --   --  <5*  --  9* 12    < > = values in this interval not displayed.     CBC  Recent Labs   Lab 05/20/23  0601 05/19/23  0700 05/18/23  0554 05/17/23  1657   HGB 10.1* 10.9* 10.7* 11.7   WBC 2.4* 2.4* 2.2* 2.2*   RBC 2.63* 2.84* 2.79* 3.04*   HCT 32.2* 34.7* 34.8* 37.6   * 122* 125* 124*   MCH 38.4* 38.4* 38.4* 38.5*   MCHC 31.4* 31.4* 30.7* 31.1*   RDW 16.6* 16.7* 17.1* 17.3*    191 201 215     INR  Recent Labs   Lab 05/17/23  1657   INR 0.98     ABG  Recent Labs   Lab 05/17/23  1703   PH 7.37      URINE STUDIES  Recent Labs   Lab Test 05/18/23  0627 09/19/22  2254 08/01/22  0319 07/08/22  0831 07/05/22  1004   COLOR Yellow Yellow Light Yellow Yellow Yellow   APPEARANCE Slightly Cloudy* Slightly Cloudy* Clear Clear Slightly Cloudy*   URINEGLC Negative Negative Negative Negative Negative   URINEBILI Negative Negative Negative Negative Negative   URINEKETONE Negative Negative  Negative Negative Trace*   SG 1.011 1.015 1.015 1.016 1.030   UBLD Negative Moderate* Negative Small* Small*   URINEPH 5.0 7.0 8.0* 5.5 5.5   PROTEIN 30* Negative Negative 30* 100*   NITRITE Negative Negative Negative Negative Negative   LEUKEST Moderate* Large* Negative Negative Negative   RBCU 3* 3*  --  <1 15*   WBCU 21* 29*  --  1 5     No lab results found.  PTH  Recent Labs   Lab Test 09/26/22  2359 09/22/22  0551   PTHI 46 30     IRON STUDIES  Recent Labs   Lab Test 09/26/22  0555 09/03/22  1039 08/24/22  0810 07/21/22  0122   IRON 54 21* 41 31*    233* 196* 285   IRONSAT 22 9* 21 11*   CARLOS 769* 343* 334* 189       IMAGING:  Reviewed    Rayna Bear Boland MD

## 2023-05-20 NOTE — PROGRESS NOTES
[facility]    Medicine Progress Note - Medicine Service, NILE TEAM 5    Date of Admission:  5/17/2023    Changes today:  - Transition to slowly adv rate of tube feeds (Currently 30 ml/hr, next increase 1pm)  - HD run today 5/20  - Nutrition closely following   - Fiber added to formula     Assessment & Plan   Sofie Rodriguez is a 62 year old female with PMH of  bilateral lung transplant 6/28/2022 for COPD c/b persistent b/l pleural effusions, post operative right phrenic palsy, ASCVD, EBV viremia,, chronic hypercapnea, gastroparesis s/p GJ tube, ESRD on HD, GI bleed 2/2 pyloric ulcer, hemobilia s/p ERCP admitted on 5/17/2023 with persistent diarrhea, weight loss, tube feed intolerance suspected osmotic diarrhea 2/2 malabsorption.     Diarrhea, osmotic   C. Diff colitis, improved   Severe gatroparesis s/p NG tube placement and EGD with botox  Feeding intolerance   Weight loss  Pt reports 5 weeks of diarrhea accompanied with 24 lb weight loss. Outpatient workup showed negative enteric panel, parasites, giardia CMV undetected on 5/12, EBV <35. Found to be c diff positive on 5/8 and initiated on vancomycin. Pt reports some symptomatic improvement but persistent diarrhrea, nausea, and lower abdominal discomfort associated with tube feeds only. No such symptoms with po intake. No improvement with recent formula change. AXR shows proper placement of GJ tube. Per GI, suspect osmotic diarrhea 2/2 malabsorption in context of gastroparesis. Discontinued vancomycin due to low suspicion for c diff given diarrhea directly related to tube feeds.   - GI consulted, appreciate recommendations:     Awaiting fecal osmolarity, electrolytes    Consider doing 24 hours tube feeding to decrease osmotic load    Continue loperamide, can try 4 mg 30 minutes prior to the tube feeding, the additional 2 mg if having diarrhea (patient had constipation/nausea with 4 mg BID in the past)  - Nutrition  consulted, appreciate recommendations:    Will start trickle (10 ml/hr) and advance by 10 ml/hr every 8 hours       Consider calorie counts once admitted to inpatient unit    Condense TF as patient tolerates with added fiber     Adjust to Creon w/ TF prior to discharge d/t insurance does not cover Relizorb.     Pancreatic insufficiency  Pt found to have pancreatic elastase pf 198 with slight to moderate panc insufficiency.    - Nutrition consulted, appreciate recommendations:     Recommend 2 capsules Creon 24 at each meal to cover oral intakes (provides 745 units lipase/kg/meal)  - PPI BID  - Lactobacillus daily    S/p bilateral Lung transplant 6/28/22  Persistent Left pleural effusion  Pt underwent b/l lung transplant 6/22 due to COPD c/b persistent bilateral pleural effusion. CMV 5/12 undetected, EBV <35. CXR reveals small left pleural effusion (unchanged from prior) with clear, hyperinflated lungs. WBC 2.2. Procal 3.4 but unreliable iso ESRD. CRP mildly elevated at 16. Influenza/RSV/Covid negative. VBG 7.37/53/64/31  - Pulm transplant consulted appreciate recs:  - Tacrolimus 3 mg qAM / 2.5 mg qPM.    - Imuran on hold since 5/8 for leukopenia  - Prednisone 5 mg qAM / 2.5 mg qPM  - Dapsone qMWF for PJP ppx  - CMV ppx not currently indicated, D+/R+, recent CMV (5/12) negative  - Continuous pulse ox  - Transplant pulmonary team consulted    Leukopenia  Neutropenia  WBC 2.2, was 4.2 two months ago. ANC 1.3 at admission iso current C.diff infection. Neutropenia etiology unclear with last white count 1.8 with ANC by report of 0.8 or 0.9 for which patient received Neupogen. Off azathioprine  - Transplant pulm consulted     ESRD on HD T/Th/Sat  Cr 5.14 on admission was 2.96 - 8.27 in past 2-3 months. On HD.   - Transplant nephrology consulted   - Dialysis T/Th/Sat    Troponinemia  Pt denies any chest pain, shortness of breath.Trop 167-->148, likely 2/2 demand ischemia iso CKD. EKG reviewed, no ST segment changes. T wave  inversion noted in V4 (new from previous EKG).   - CTM       Diet: Advance Diet as Tolerated: Regular Diet Adult; Thin Liquids (level 0)  Adult Formula Drip Feeding: Continuous Other; Cloud Content Renal 1.8; Jejunostomy; Goal Rate: Start at 10 ml/hr and advance by 10 ml/hr every 8 hours as patient tolerates. Goal rate 40 ml/hr; mL/hr; Use Relizorb per orders    DVT Prophylaxis: Pneumatic Compression Devices  Dong Catheter: Not present  Lines: PRESENT      CVC Right Tunneled-Site Assessment: WDL      Cardiac Monitoring: None  Code Status: Full Code      Clinically Significant Risk Factors         # Hypernatremia: Highest Na = 146 mmol/L in last 2 days, will monitor as appropriate                          Disposition Plan         The patient's care was discussed with the Attending Physician, Dr. Pantoja.    Catalino Pantoja MD  Medicine Service, [address]  Securely message with [employer] (more info)  Text page via [employer]   See signed in provider for up to date coverage information  ______________________________________________________________________    Interval History      Tolerating TF at current rate up to 30ml/hr.   Will have HD run today.    No new complaints, nausea, headache.  No SOB, lightheaded or dizziness.       Physical Exam   Vital Signs: Temp: 98.3  F (36.8  C) Temp src: Oral BP: 125/74 Pulse: 88   Resp: 16 SpO2: 98 % O2 Device: None (Room air) Oxygen Delivery: 2 LPM  Weight: 120 lbs 1.6 oz   Constitutional: awake, alert, cooperative, no apparent distress, and appears stated age  HENT:      Head: Normocephalic and atraumatic.      Right Ear: External ear normal.  Eyes: EOM intact, no icterus  Respiratory: No increased work of breathing, good air exchange, clear to auscultation bilaterally  Cardiovascular: RRR  GI: No scars, normal bowel sounds, soft, non-distended, non-tender, no masses palpated, no hepatosplenomegally. GJ tube in  place       Data     I have personally reviewed the following data over the past 24 hrs:    Most Recent 3 CBC's:  Recent Labs   Lab Test 05/20/23  0601 05/19/23  0700 05/18/23  0554   WBC 2.4* 2.4* 2.2*   HGB 10.1* 10.9* 10.7*   * 122* 125*    191 201     Most Recent 3 BMP's:  Recent Labs   Lab Test 05/20/23  0916 05/20/23  0601 05/19/23  1207 05/19/23  0700 05/18/23  1926 05/18/23  0554   NA  --  146*  --  132*  --  141   POTASSIUM  --  3.9  --  4.6  --  5.0   CHLORIDE  --  103  --  94*  --  103   CO2  --  27  --  29  --  28   BUN  --  22.0  --  14.3  --  18.8   CR  --  4.84*  --  3.60*  --  5.79*   ANIONGAP  --  16*  --  9  --  10   ESTUARDO  --  8.9  --  9.3  --  9.5   * 126* 126* 92   < > 83    < > = values in this interval not displayed.     Most Recent 2 LFT's:  Recent Labs   Lab Test 05/19/23  0700 05/18/23  0554   AST 22 26   ALT <5* 9*   ALKPHOS 56 48   BILITOTAL 0.6 0.6     Most Recent 3 Creatinines:  Recent Labs   Lab Test 05/20/23  0601 05/19/23  0700 05/18/23  0554   CR 4.84* 3.60* 5.79*     Most Recent 3 Hemoglobins:  Recent Labs   Lab Test 05/20/23  0601 05/19/23  0700 05/18/23  0554   HGB 10.1* 10.9* 10.7*     Most Recent 6 Bacteria Isolates From Any Culture (See EPIC Reports for Culture Details):  Recent Labs   Lab Test 06/14/21  0939   CULT Culture negative for acid fast bacilli  Assayed at [facility] [address] [place] [id number] [phone]  >10 Squamous epithelial cells/low power field indicates oral contamination. Please   recollect.  *  >10 Squamous epithelial cells/low power field indicates oral contamination. Please   recollect.  *     Most Recent Urinalysis:  Recent Labs   Lab Test 05/18/23  0627   COLOR Yellow   APPEARANCE Slightly Cloudy*   URINEGLC Negative   URINEBILI Negative   URINEKETONE Negative   SG 1.011   UBLD Negative   URINEPH 5.0   PROTEIN 30*   NITRITE Negative   LEUKEST Moderate*   RBCU 3*   WBCU 21*     Most Recent ESR & CRP:  Recent Labs    Lab Test 05/17/23  1657   SED 24   CRPI 16.00*             Imaging results reviewed over the past 24 hrs:   No results found for this or any previous visit (from the past 24 hour(s)).

## 2023-05-20 NOTE — PROGRESS NOTES
Patient was admitted from ED at 1700 with failure to,diarrhea,C-difficile and low WBC count.History of COPD and  bilateral lung transplant and end stage kidney disease,pt on hemodialysis,dialysis  Tuesday,Thursday and  Saturdays Next hemodialysis due  Tomorrow..Alert and oriented x4.Denied pain or  any discomfort.Admission profile done,skin intact except for bruise  on right arm G/J feeding tube,Right CVC catheter for dialysis.and PIV right forearm.Patient has old healed scar on chest wall from lung transplant..Admission skin check done by writer and nurse Loida RITTER.Tube feeding running at 10 ml/hr to be advanced every 8hr till goal of 40 ml/hr is reached,pt tolerating feed ,no diarrhea and no nausea.,next rate advance due at 2100.Patient up ad magalie in room.Continue per plan of care.

## 2023-05-21 LAB
CHLORIDE STL-SCNC: 35 MMOL/L
GLUCOSE BLDC GLUCOMTR-MCNC: 128 MG/DL (ref 70–99)
HOLD SPECIMEN: NORMAL
OSMOLALITY STL: 328 MOSM/KG
POTASSIUM STL-SCNC: 22 MMOL/L
SODIUM STL-SCNC: 92 MMOL/L
TACROLIMUS BLD-MCNC: 12.1 UG/L (ref 5–15)
TME LAST DOSE: NORMAL H
TME LAST DOSE: NORMAL H

## 2023-05-21 PROCEDURE — 250N000013 HC RX MED GY IP 250 OP 250 PS 637: Performed by: STUDENT IN AN ORGANIZED HEALTH CARE EDUCATION/TRAINING PROGRAM

## 2023-05-21 PROCEDURE — 250N000012 HC RX MED GY IP 250 OP 636 PS 637: Performed by: PHYSICIAN ASSISTANT

## 2023-05-21 PROCEDURE — 80197 ASSAY OF TACROLIMUS: CPT | Performed by: PHYSICIAN ASSISTANT

## 2023-05-21 PROCEDURE — 250N000012 HC RX MED GY IP 250 OP 636 PS 637

## 2023-05-21 PROCEDURE — 250N000013 HC RX MED GY IP 250 OP 250 PS 637

## 2023-05-21 PROCEDURE — 36415 COLL VENOUS BLD VENIPUNCTURE: CPT | Performed by: PHYSICIAN ASSISTANT

## 2023-05-21 PROCEDURE — 250N000011 HC RX IP 250 OP 636

## 2023-05-21 PROCEDURE — 99232 SBSQ HOSP IP/OBS MODERATE 35: CPT | Mod: GC | Performed by: HOSPITALIST

## 2023-05-21 PROCEDURE — 214N000001 HC R&B CCU UMMC

## 2023-05-21 RX ORDER — ONDANSETRON 4 MG/1
4 TABLET, ORALLY DISINTEGRATING ORAL EVERY 6 HOURS PRN
Status: DISCONTINUED | OUTPATIENT
Start: 2023-05-21 | End: 2023-05-23 | Stop reason: HOSPADM

## 2023-05-21 RX ADMIN — PANCRELIPASE 2 CAPSULE: 24000; 76000; 120000 CAPSULE, DELAYED RELEASE PELLETS ORAL at 12:19

## 2023-05-21 RX ADMIN — Medication 1 PACKET: at 12:20

## 2023-05-21 RX ADMIN — ONDANSETRON 4 MG: 4 TABLET, ORALLY DISINTEGRATING ORAL at 22:32

## 2023-05-21 RX ADMIN — Medication 1 PACKET: at 18:39

## 2023-05-21 RX ADMIN — METOPROLOL TARTRATE 25 MG: 25 TABLET, FILM COATED ORAL at 08:58

## 2023-05-21 RX ADMIN — Medication 40 MG: at 09:06

## 2023-05-21 RX ADMIN — TACROLIMUS 2 MG: 5 CAPSULE ORAL at 18:42

## 2023-05-21 RX ADMIN — LOPERAMIDE HCL 2 MG: 1 SOLUTION ORAL at 16:09

## 2023-05-21 RX ADMIN — PANCRELIPASE 2 CAPSULE: 24000; 76000; 120000 CAPSULE, DELAYED RELEASE PELLETS ORAL at 08:59

## 2023-05-21 RX ADMIN — PREDNISONE 2.5 MG: 2.5 TABLET ORAL at 20:34

## 2023-05-21 RX ADMIN — Medication 1 PACKET: at 08:59

## 2023-05-21 RX ADMIN — LOPERAMIDE HCL 2 MG: 1 SOLUTION ORAL at 12:19

## 2023-05-21 RX ADMIN — Medication 500 MCG: at 08:58

## 2023-05-21 RX ADMIN — TACROLIMUS 2 MG: 5 CAPSULE ORAL at 09:06

## 2023-05-21 RX ADMIN — PREDNISONE 5 MG: 5 TABLET ORAL at 08:58

## 2023-05-21 RX ADMIN — Medication 1 CAPSULE: at 08:58

## 2023-05-21 RX ADMIN — LOPERAMIDE HCL 2 MG: 1 SOLUTION ORAL at 08:58

## 2023-05-21 RX ADMIN — Medication 40 MG: at 20:34

## 2023-05-21 RX ADMIN — METOPROLOL TARTRATE 25 MG: 25 TABLET, FILM COATED ORAL at 20:34

## 2023-05-21 RX ADMIN — LOPERAMIDE HCL 2 MG: 1 SOLUTION ORAL at 20:34

## 2023-05-21 ASSESSMENT — ACTIVITIES OF DAILY LIVING (ADL)
ADLS_ACUITY_SCORE: 20

## 2023-05-21 NOTE — PLAN OF CARE
Sofie Rodriguez is a 62 year old female with PMH of COPD s/p b/l lung transplant (6/2022) c/b persistent b/l pleural effusions, recurrent c diff colitis, post operative right phrenic palsy, ASCVD, EBV viremia, Hepatitic C, chronic hypercapnea, gastroparesis s/p GJ tube, ESRD on HD, GI bleed 2/2 pyloric ulcer, hemobilia s/p ERCP and MRCP, admitted with C diff, weight loss, and failure to thrive.    Temp: 98  F (36.7  C) Temp src: Oral BP: 102/59 Pulse: 82   Resp: 16 SpO2: 98 % O2 Device: Nasal cannula Oxygen Delivery: 2 LPM    Neuro: Aox4, calls appropriately  Cardiac: No tele, regular rhythm  Resp: Sating well on RA, 2L NC while sleeping  GI/: Oliguric(on HD), history of Cdiff and diarrhea, on immodium, soft BM yesterday  Diet: Regular diet, continuous TF through GJ  Activity: Up ad magalie  Skin: no new deficits  Pain: Denies  LDA's: R PIV SL,R 2L CVC, GJ tube  Changes/events: Increased TF rate to 40 ml/hr at 0600  Plan: Continue with POC and contact [facility] with changes/concerns

## 2023-05-21 NOTE — PROGRESS NOTES
Pt c/o nausea at 3:10pm. Declined anti nausea meds. Requesting TF turned back down to 30ml/hr. Will continue to monitor

## 2023-05-21 NOTE — PROGRESS NOTES
Chart review only:    Tacrolimus level of 12.1 at 10 hours after 3 doses of 2 mg BID (goal 8-12). No dose change.    - Ordered steady state level for Monday, 5/22    Kaylin Triana PA-C

## 2023-05-21 NOTE — PROGRESS NOTES
[facility]    Medicine Progress Note - Medicine Service, NILE TEAM 5    Date of Admission:  5/17/2023    Changes today:  - No clinical changes to plan today  - TF rate at 40 ml/hr, advance per RD  - Discontinue Lactobacillus     Assessment & Plan   Sofie Rodriguez is a 62 year old female with PMH of  bilateral lung transplant 6/28/2022 for COPD c/b persistent b/l pleural effusions, post operative right phrenic palsy, ASCVD, EBV viremia,, chronic hypercapnea, gastroparesis s/p GJ tube, ESRD on HD, GI bleed 2/2 pyloric ulcer, hemobilia s/p ERCP admitted on 5/17/2023 with persistent diarrhea, weight loss, tube feed intolerance suspected osmotic diarrhea 2/2 malabsorption.     Diarrhea, osmotic, improving   C. Diff colitis, resolved   Severe gatroparesis s/p NG tube placement and EGD with botox  Feeding intolerance   Weight loss  Pt reports 5 weeks of diarrhea accompanied with 24 lb weight loss. Outpatient workup showed negative enteric panel, parasites, giardia CMV undetected on 5/12, EBV <35. Found to be c diff positive on 5/8 and initiated on vancomycin. Pt reports some symptomatic improvement but persistent diarrhrea, nausea, and lower abdominal discomfort associated with tube feeds only. No such symptoms with po intake. No improvement with recent formula change. AXR shows proper placement of GJ tube. Per GI, suspect osmotic diarrhea 2/2 malabsorption in context of gastroparesis. Discontinued vancomycin due to low suspicion for c diff given diarrhea directly related to tube feeds.   - GI consulted, appreciate recommendations:     Awaiting fecal osmolarity, electrolytes    Consider doing 24 hours tube feeding to decrease osmotic load    Continue loperamide, can try 4 mg 30 minutes prior to the tube feeding, the additional 2 mg if having diarrhea (patient had constipation/nausea with 4 mg BID in the past)  - Nutrition consulted, appreciate recommendations:    Will start  trickle (10 ml/hr) and advance by 10 ml/hr every 8 hours       Consider calorie counts once admitted to inpatient unit    Condense TF as patient tolerates with added fiber     Adjust to Creon w/ TF prior to discharge d/t insurance does not cover Relizorb.     Pancreatic insufficiency  Pt found to have pancreatic elastase pf 198 with slight to moderate panc insufficiency.    - Nutrition consulted, appreciate recommendations:     Recommend 2 capsules Creon 24 at each meal to cover oral intakes (provides 745 units lipase/kg/meal)  - PPI BID    S/p bilateral Lung transplant 6/28/22  Persistent Left pleural effusion  Pt underwent b/l lung transplant 6/22 due to COPD c/b persistent bilateral pleural effusion. CMV 5/12 undetected, EBV <35. CXR reveals small left pleural effusion (unchanged from prior) with clear, hyperinflated lungs. WBC 2.2. Procal 3.4 but unreliable iso ESRD. CRP mildly elevated at 16. Influenza/RSV/Covid negative. VBG 7.37/53/64/31  - Pulm transplant consulted appreciate recs:  - Tacrolimus 3 mg qAM / 2.5 mg qPM.    - Imuran on hold since 5/8 for leukopenia  - Prednisone 5 mg qAM / 2.5 mg qPM  - Dapsone qMWF for PJP ppx  - CMV ppx not currently indicated, D+/R+, recent CMV (5/12) negative  - Continuous pulse ox  - Transplant pulmonary team consulted    Leukopenia  Neutropenia  WBC 2.2, was 4.2 two months ago. ANC 1.3 at admission iso current C.diff infection. Neutropenia etiology unclear with last white count 1.8 with ANC by report of 0.8 or 0.9 for which patient received Neupogen. Off azathioprine  - Transplant pulm consulted     ESRD on HD T/Th/Sat  Cr 5.14 on admission was 2.96 - 8.27 in past 2-3 months. On HD.   - Transplant nephrology consulted   - Dialysis T/Th/Sat    Troponinemia  Pt denies any chest pain, shortness of breath.Trop 167-->148, likely 2/2 demand ischemia iso CKD. EKG reviewed, no ST segment changes. T wave inversion noted in V4 (new from previous EKG).   - CTM       Diet: Advance  "Diet as Tolerated: Regular Diet Adult; Thin Liquids (level 0)  Adult Formula Drip Feeding: Continuous Other; [employer] Renal 1.8; Jejunostomy; Goal Rate: Start at 10 ml/hr and advance by 10 ml/hr every 8 hours as patient tolerates. Goal rate 40 ml/hr; mL/hr; Use Relizorb per orders    DVT Prophylaxis: Pneumatic Compression Devices  Dong Catheter: Not present  Lines: PRESENT      CVC Right Tunneled-Site Assessment: WDL      Cardiac Monitoring: None  Code Status: Full Code      Clinically Significant Risk Factors         # Hypernatremia: Highest Na = 146 mmol/L in last 2 days, will monitor as appropriate                          Disposition Plan     Expected Discharge Date: 05/24/2023      Destination: home with family        The patient's care was discussed with the Attending Physician, Dr. Pantoja.    Gerardo Cuba MD  Medicine Service, [facility] TEAM [address] [place] [address]  Securely message with [employer] (more info)  Text page via [employer] Paging/Directory   See signed in provider for up to date coverage information  ______________________________________________________________________    Interval History    Nursing notes reviewed. ELOINA.    Patient states that she had a few \"mushy/formed\" stools yesterday. Her diarrhea symptoms which brought to the hospital have resolved. No other acute complaints or concerns at this time.       Physical Exam   Vital Signs: Temp: 99.1  F (37.3  C) Temp src: Oral BP: 110/73 Pulse: 78   Resp: 16 SpO2: 95 % O2 Device: None (Room air) Oxygen Delivery: 2 LPM  Weight: 120 lbs 3.2 oz   Constitutional: awake, alert, cooperative, no apparent distress, and appears stated age  HENT:      Head: Normocephalic and atraumatic.      Right Ear: External ear normal.  Eyes: EOM intact, no icterus  Respiratory: No increased work of breathing, good air exchange, clear to auscultation bilaterally  Cardiovascular: RRR  GI: No scars, normal bowel sounds, soft, " non-distended, non-tender, no masses palpated, no hepatosplenomegally. GJ tube in place       Data     Most Recent 3 CBC's:  Recent Labs   Lab Test 05/20/23  0601 05/19/23  0700 05/18/23  0554   WBC 2.4* 2.4* 2.2*   HGB 10.1* 10.9* 10.7*   * 122* 125*    191 201     Most Recent 3 BMP's:  Recent Labs   Lab Test 05/21/23  1026 05/20/23  0916 05/20/23  0601 05/19/23  1207 05/19/23  0700 05/18/23  1926 05/18/23  0554   NA  --   --  146*  --  132*  --  141   POTASSIUM  --   --  3.9  --  4.6  --  5.0   CHLORIDE  --   --  103  --  94*  --  103   CO2  --   --  27  --  29  --  28   BUN  --   --  22.0  --  14.3  --  18.8   CR  --   --  4.84*  --  3.60*  --  5.79*   ANIONGAP  --   --  16*  --  9  --  10   ESTUARDO  --   --  8.9  --  9.3  --  9.5   * 136* 126*   < > 92   < > 83    < > = values in this interval not displayed.     Most Recent 2 LFT's:  Recent Labs   Lab Test 05/19/23  0700 05/18/23  0554   AST 22 26   ALT <5* 9*   ALKPHOS 56 48   BILITOTAL 0.6 0.6     Most Recent 3 Creatinines:  Recent Labs   Lab Test 05/20/23  0601 05/19/23  0700 05/18/23  0554   CR 4.84* 3.60* 5.79*     Most Recent 3 Hemoglobins:  Recent Labs   Lab Test 05/20/23  0601 05/19/23  0700 05/18/23  0554   HGB 10.1* 10.9* 10.7*     Most Recent 6 Bacteria Isolates From Any Culture (See EPIC Reports for Culture Details):  Recent Labs   Lab Test 06/14/21  0939   CULT Culture negative for acid fast bacilli  Assayed at [facility] [address] [place] [id number] [phone]  >10 Squamous epithelial cells/low power field indicates oral contamination. Please   recollect.  *  >10 Squamous epithelial cells/low power field indicates oral contamination. Please   recollect.  *     Most Recent Urinalysis:  Recent Labs   Lab Test 05/18/23  0627   COLOR Yellow   APPEARANCE Slightly Cloudy*   URINEGLC Negative   URINEBILI Negative   URINEKETONE Negative   SG 1.011   UBLD Negative   URINEPH 5.0   PROTEIN 30*   NITRITE Negative   LEUKEST  Moderate*   RBCU 3*   WBCU 21*     Most Recent ESR & CRP:  Recent Labs   Lab Test 05/17/23  1657   SED 24   CRPI 16.00*             Imaging results reviewed over the past 24 hrs:   No results found for this or any previous visit (from the past 24 hour(s)).

## 2023-05-21 NOTE — PLAN OF CARE
Goal Outcome Evaluation:  Temp: 98.6  F (37  C) Temp src: Oral BP: 112/67 Pulse: 82   Resp: 16 SpO2: 95 % O2 Device: None (Room air)     Neuro: A&Ox4.   Cardiac: AVSS. No tele  Respiratory: Sating 95% on RA.  GI/: BM today. Oliguric, HD T,TH,Sat   Diet/appetite: TF at goal 40ml/hr via J tube. GT clamped. Tolerating diet. Appetite fair  Activity: Up to bathroom independently   Pain: Denies   Skin: No new deficits noted.  LDA's: PIV, CVC right chest for Dialysis    Plan: Continue with POC. Notify primary team with changes.

## 2023-05-22 DIAGNOSIS — Z94.2 S/P LUNG TRANSPLANT (H): Primary | ICD-10-CM

## 2023-05-22 DIAGNOSIS — D70.9 NEUTROPENIA, UNSPECIFIED TYPE (H): ICD-10-CM

## 2023-05-22 LAB
ANION GAP SERPL CALCULATED.3IONS-SCNC: 11 MMOL/L (ref 7–15)
BACTERIA BLD CULT: NO GROWTH
BACTERIA BLD CULT: NO GROWTH
BUN SERPL-MCNC: 36.7 MG/DL (ref 8–23)
CALCIUM SERPL-MCNC: 8.8 MG/DL (ref 8.8–10.2)
CHLORIDE SERPL-SCNC: 94 MMOL/L (ref 98–107)
CREAT SERPL-MCNC: 4.42 MG/DL (ref 0.51–0.95)
DEPRECATED HCO3 PLAS-SCNC: 27 MMOL/L (ref 22–29)
GFR SERPL CREATININE-BSD FRML MDRD: 11 ML/MIN/1.73M2
GLUCOSE SERPL-MCNC: 107 MG/DL (ref 70–99)
IMMUKNOW IMMUNE CELL FUNCTION: 73 NG/ML
MAGNESIUM SERPL-MCNC: 1.5 MG/DL (ref 1.7–2.3)
O+P STL MICRO: NEGATIVE
PHOSPHATE SERPL-MCNC: 2.7 MG/DL (ref 2.5–4.5)
POTASSIUM SERPL-SCNC: 4.5 MMOL/L (ref 3.4–5.3)
SODIUM SERPL-SCNC: 132 MMOL/L (ref 136–145)
TACROLIMUS BLD-MCNC: 8.9 UG/L (ref 5–15)
TME LAST DOSE: NORMAL H
TME LAST DOSE: NORMAL H

## 2023-05-22 PROCEDURE — 83735 ASSAY OF MAGNESIUM: CPT | Performed by: STUDENT IN AN ORGANIZED HEALTH CARE EDUCATION/TRAINING PROGRAM

## 2023-05-22 PROCEDURE — 36415 COLL VENOUS BLD VENIPUNCTURE: CPT | Performed by: PHYSICIAN ASSISTANT

## 2023-05-22 PROCEDURE — 82310 ASSAY OF CALCIUM: CPT | Performed by: STUDENT IN AN ORGANIZED HEALTH CARE EDUCATION/TRAINING PROGRAM

## 2023-05-22 PROCEDURE — 250N000011 HC RX IP 250 OP 636: Performed by: STUDENT IN AN ORGANIZED HEALTH CARE EDUCATION/TRAINING PROGRAM

## 2023-05-22 PROCEDURE — 214N000001 HC R&B CCU UMMC

## 2023-05-22 PROCEDURE — 250N000013 HC RX MED GY IP 250 OP 250 PS 637

## 2023-05-22 PROCEDURE — 80197 ASSAY OF TACROLIMUS: CPT | Performed by: PHYSICIAN ASSISTANT

## 2023-05-22 PROCEDURE — 250N000011 HC RX IP 250 OP 636

## 2023-05-22 PROCEDURE — 250N000013 HC RX MED GY IP 250 OP 250 PS 637: Performed by: STUDENT IN AN ORGANIZED HEALTH CARE EDUCATION/TRAINING PROGRAM

## 2023-05-22 PROCEDURE — 99233 SBSQ HOSP IP/OBS HIGH 50: CPT | Performed by: PHYSICIAN ASSISTANT

## 2023-05-22 PROCEDURE — 99232 SBSQ HOSP IP/OBS MODERATE 35: CPT | Mod: GC | Performed by: HOSPITALIST

## 2023-05-22 PROCEDURE — 250N000012 HC RX MED GY IP 250 OP 636 PS 637

## 2023-05-22 PROCEDURE — 250N000009 HC RX 250

## 2023-05-22 PROCEDURE — 250N000012 HC RX MED GY IP 250 OP 636 PS 637: Performed by: PHYSICIAN ASSISTANT

## 2023-05-22 PROCEDURE — 84100 ASSAY OF PHOSPHORUS: CPT | Performed by: STUDENT IN AN ORGANIZED HEALTH CARE EDUCATION/TRAINING PROGRAM

## 2023-05-22 RX ORDER — SODIUM BICARBONATE 325 MG/1
325 TABLET ORAL EVERY 4 HOURS
Status: DISCONTINUED | OUTPATIENT
Start: 2023-05-22 | End: 2023-05-23 | Stop reason: HOSPADM

## 2023-05-22 RX ORDER — MAGNESIUM SULFATE HEPTAHYDRATE 40 MG/ML
2 INJECTION, SOLUTION INTRAVENOUS ONCE
Status: COMPLETED | OUTPATIENT
Start: 2023-05-22 | End: 2023-05-22

## 2023-05-22 RX ADMIN — TACROLIMUS 2 MG: 5 CAPSULE ORAL at 17:46

## 2023-05-22 RX ADMIN — Medication 1 PACKET: at 12:46

## 2023-05-22 RX ADMIN — Medication 40 MG: at 08:12

## 2023-05-22 RX ADMIN — TACROLIMUS 2 MG: 5 CAPSULE ORAL at 08:12

## 2023-05-22 RX ADMIN — METOPROLOL TARTRATE 25 MG: 25 TABLET, FILM COATED ORAL at 08:12

## 2023-05-22 RX ADMIN — PREDNISONE 5 MG: 5 TABLET ORAL at 08:12

## 2023-05-22 RX ADMIN — SODIUM BICARBONATE 325 MG: 325 TABLET ORAL at 22:03

## 2023-05-22 RX ADMIN — PANCRELIPASE 2 CAPSULE: 24000; 76000; 120000 CAPSULE, DELAYED RELEASE PELLETS ORAL at 17:46

## 2023-05-22 RX ADMIN — PREDNISONE 2.5 MG: 2.5 TABLET ORAL at 20:20

## 2023-05-22 RX ADMIN — MAGNESIUM SULFATE IN WATER 2 G: 40 INJECTION, SOLUTION INTRAVENOUS at 10:04

## 2023-05-22 RX ADMIN — SODIUM BICARBONATE 325 MG: 325 TABLET ORAL at 17:45

## 2023-05-22 RX ADMIN — ONDANSETRON 4 MG: 4 TABLET, ORALLY DISINTEGRATING ORAL at 18:30

## 2023-05-22 RX ADMIN — Medication 1 PACKET: at 08:12

## 2023-05-22 RX ADMIN — DAPSONE 50 MG: 100 TABLET ORAL at 08:12

## 2023-05-22 RX ADMIN — PANCRELIPASE 2 CAPSULE: 24000; 76000; 120000 CAPSULE, DELAYED RELEASE PELLETS ORAL at 08:13

## 2023-05-22 RX ADMIN — LOPERAMIDE HCL 2 MG: 1 SOLUTION ORAL at 20:20

## 2023-05-22 RX ADMIN — LOPERAMIDE HCL 2 MG: 1 SOLUTION ORAL at 12:46

## 2023-05-22 RX ADMIN — LOPERAMIDE HCL 2 MG: 1 SOLUTION ORAL at 15:56

## 2023-05-22 RX ADMIN — METOPROLOL TARTRATE 25 MG: 25 TABLET, FILM COATED ORAL at 20:20

## 2023-05-22 RX ADMIN — PANCRELIPASE 2 CAPSULE: 24000; 76000; 120000 CAPSULE, DELAYED RELEASE PELLETS ORAL at 12:46

## 2023-05-22 RX ADMIN — Medication 500 MCG: at 08:12

## 2023-05-22 RX ADMIN — Medication 40 MG: at 20:20

## 2023-05-22 RX ADMIN — PANCRELIPASE 2 CAPSULE: 24000; 76000; 120000 CAPSULE, DELAYED RELEASE PELLETS ORAL at 17:45

## 2023-05-22 RX ADMIN — LOPERAMIDE HCL 2 MG: 1 SOLUTION ORAL at 08:12

## 2023-05-22 RX ADMIN — PANCRELIPASE 1 CAPSULE: 24000; 76000; 120000 CAPSULE, DELAYED RELEASE PELLETS ORAL at 22:03

## 2023-05-22 ASSESSMENT — ACTIVITIES OF DAILY LIVING (ADL)
ADLS_ACUITY_SCORE: 20
ADLS_ACUITY_SCORE: 20
DEPENDENT_IADLS:: INDEPENDENT
ADLS_ACUITY_SCORE: 20

## 2023-05-22 NOTE — PROGRESS NOTES
GI sign off note:    Patient clinically improved with continuous TF 40 mL/hr, tolerating. Is on imodium up to 2 mg QID.     Plan  - tube feeding per dietitian  - Please recheck the fecal elastase again as prior was only marginally low (198 from cut off of 200). Creon would not effect the result. Ordered for you. If negative, could consider stop the Creon. This could be relatively false positive in the setting of profusely diarrhea.   - continue as needed imodium     GI will sign off.  Discussed with Dr. Stallworth.  Joshua Morgan MD  Gastroenterology/Hepatology Fellow

## 2023-05-22 NOTE — PROGRESS NOTES
CLINICAL NUTRITION SERVICES     Nutrition Prescription    RECOMMENDATIONS FOR MDs/PROVIDERS TO ORDER:  -See prior RD notes  -Rec gradually increase TF rate to goal of 40 mL/hr, as tolerated. Goal for pt to consume at least 300-400 kcals/day orally if only tolerating TFs at 30 mL/hr.   -Adjust free water flushes via feeding tube as needed, pending fluid status. Currently, free water flushes are minimal, or for patency.  -Rec order for Creon 24, 1 capsule for fat-containing snacks/oral supplements      Malnutrition Status:    See prior RD notes    Recommendations already ordered by Registered Dietitian (RD):  Outpatient RD referral  Oral supplement trials    Future/Additional Recommendations:  See prior RD notes  Could consider trialing Vital 1.5 TF formula (semi-elemental TF) but TF formula has additional K+ and would likely need to be on florinef.     Received page from team regarding TFs and oral intake.     Diet: Adv diet as tolerated, regular + thin liquids. Pt consuming 0-50% at meals. Ordered to receive Creon 24, 2 capsules 3 times daily at meals.     TF: Goal continuous feeds, [employer] Renal 1.8 @ 40 ml/hr which provides 960 mL, 1728 kcals, 77 g protein, 643 mL H2O, 84 g fat, and 15 g fiber daily. TF currently infusing at 30 mL/hr this morning  which provides 720 mL TF, 1296 kcals, 58 g protein, 482 mL H2O, 124 g CHO, 63 g fat, and 12 g fiber daily. Estimated needs are 7149-8730 kcals/day (25 - 30 kcals/kg) and 77-97 grams protein/day (1.2 - 1.5 grams of pro/kg). Pt has had trouble (nausea) tolerating TFs at 40 mL/hr. Receiving relizorb for pancreatic enzyme replacement therapy (PERT). Note, pt has been on Osmolite 1.5, Nepro, Novasource Renal, and Eneida Awdio Renal 1.8 during previous hospital stays.     Labs: Noting, K+ was 5 on 5/18. Phos was last elevated (4.7) on 8/1/2022.     INTERVENTIONS:  Implementation:  Collaboration with other providers: Discussed pt with team. Per team, GI has been involved.  Osmotic diarrhea. Discussed rec for pt to consume at least 300-400 kcals/day and continue TFs at 30 mL/hr until able to increase gradually. Discussed plan for transition to relizorb to Creon/sodium bicarb (pt does not have insurance coverage for relizorb at this time). Placed outpatient RD referral. Discussed pt with RN.   Medical food supplement therapy: Ordered oral supplement trials (to help encourage oral intake), Ensure Clears and Vital Cuisines.   Nutrition education: Discussed nutrition plan with pt. Encouraged pt to consuming 400 kcals/day if only able to tolerate TFs at 30 ml/hr. States consuming Ipselex Renal 1.8 TF orally does not sound appetizing. She may try oral supplements. Discussed PERT.    Enteral Nutrition: Adjusted pancreatic enzymes as potential discharge 5/23 (per discussion with team).    A) Sodium Bicarb tablet (325 mg), 1 tablet q 4 hrs via Jtube. Administration Instructions: Crush 1 tablet and mix into 15 ml of warm water and use this solution to mix with Creon pancreatic enzymes. DO NOT administer directly into Jtube (to be mixed into TF formula with Creon enzyme - see Creon enzyme order)   B) Creon 24, 1-2 capsules q 4 hrs via Jtube. Administration Instructions:   --If TF rate is running @ 10-34 ml/hr, administer 1 capsule q 4 hrs. Provides 2286 units of lipase/kg at meals when TF at 30 mL/hr.   --If TF rate is running @ 35-40 ml/hr, increase to 2 capsules q 4 hrs. Provides 3429 units of lipase/kg at meals when TF at 40 mL/hr.   **Open capsule and empty contents into 15 ml sodium bicarb solution (see sodium bicarb order), let dissolve for about 20-30 minutes and then add this solution to the amount of TF formula hung in TF bag every 4 hrs (i.e.,TF @ infusion 30 ml/hr will mix 1 capsule into 120 ml of TF formula every 4 hrs).   *Note: this enzyme regimen with TF @ 30 will provide approx 2286 units of lipase/gram of total Fat daily and approp dosing initially for pancreatic insufficiency  with more elemental TF formula.     Follow up/Monitoring:  Will continue to follow pt.    Kathie Bateman, MS, RD, LD, CNSC   6C/5A(beds 5201 - 5205) RD pgr: [phone]

## 2023-05-22 NOTE — PROGRESS NOTES
[facility]    Medicine Progress Note - Medicine Service, NILE TEAM 5    Date of Admission:  5/17/2023    Changes today:  - Continue TF rate at 30 ml/hr and PO as tolerated   - Plan for likely discharge on 5/23/23 following dialysis run     Assessment & Plan   Sofie Rodriguez is a 62 year old female with PMH of  bilateral lung transplant 6/28/2022 for COPD c/b persistent b/l pleural effusions, post operative right phrenic palsy, ASCVD, EBV viremia,, chronic hypercapnea, gastroparesis s/p GJ tube, ESRD on HD, GI bleed 2/2 pyloric ulcer, hemobilia s/p ERCP admitted on 5/17/2023 with persistent diarrhea, weight loss, tube feed intolerance suspected osmotic diarrhea 2/2 malabsorption.     Diarrhea, osmotic, improving   C. Diff colitis, resolved   Severe gatroparesis s/p NG tube placement and EGD with botox  Feeding intolerance   Weight loss  Pt reports 5 weeks of diarrhea accompanied with 24 lb weight loss. Outpatient workup showed negative enteric panel, parasites, giardia CMV undetected on 5/12, EBV <35. Found to be c diff positive on 5/8 and initiated on vancomycin. Pt reports some symptomatic improvement but persistent diarrhrea, nausea, and lower abdominal discomfort associated with tube feeds only. No such symptoms with po intake. No improvement with recent formula change. AXR shows proper placement of GJ tube. Per GI, suspect osmotic diarrhea 2/2 malabsorption in context of gastroparesis, fecal osm gap of 100. Discontinued vancomycin due to low suspicion for c diff given diarrhea directly related to tube feeds.   - GI consulted, appreciate recommendations:     Consider doing 24 hours tube feeding to decrease osmotic load    Continue loperamide, can try 4 mg 30 minutes prior to the tube feeding, the additional 2 mg if having diarrhea (patient had constipation/nausea with 4 mg BID in the past)  - Nutrition consulted, appreciate recommendations:    Rec gradually increase  TF rate to goal of 40 mL/hr, as tolerated. Goal for pt to consume at least 300-400 kcals/day orally.     Adjust free water flushes via feeding tube as needed, pending fluid status. Currently, free water flushes are minimal, or for patency.    Rec order for Creon, 1 capsule for snacks/oral supplements         Pancreatic insufficiency  Pt found to have pancreatic elastase pf 198 with slight to moderate panc insufficiency.    - Nutrition consulted, appreciate recommendations:     Recommend 2 capsules Creon 24 at each meal to cover oral intakes (provides 745 units lipase/kg/meal)  - PPI BID    S/p bilateral Lung transplant 6/28/22  Persistent Left pleural effusion  Pt underwent b/l lung transplant 6/22 due to COPD c/b persistent bilateral pleural effusion. CMV 5/12 undetected, EBV <35. CXR reveals small left pleural effusion (unchanged from prior) with clear, hyperinflated lungs. WBC 2.2. Procal 3.4 but unreliable iso ESRD. CRP mildly elevated at 16. Influenza/RSV/Covid negative. VBG 7.37/53/64/31  - Pulm transplant consulted appreciate recs:  - Tacrolimus 3 mg qAM / 2.5 mg qPM.    - Imuran on hold since 5/8 for leukopenia  - Prednisone 5 mg qAM / 2.5 mg qPM  - Dapsone qMWF for PJP ppx  - CMV ppx not currently indicated, D+/R+, recent CMV (5/12) negative  - Continuous pulse ox  - Transplant pulmonary team consulted    Plan for follow up in 2 weeks     Leukopenia  Neutropenia  WBC 2.2, was 4.2 two months ago. ANC 1.3 at admission iso current C.diff infection. Neutropenia etiology unclear with last white count 1.8 with ANC by report of 0.8 or 0.9 for which patient received Neupogen. Off azathioprine  - Transplant pulm consulted     ESRD on HD T/Th/Sat  Cr 5.14 on admission was 2.96 - 8.27 in past 2-3 months. On HD.   - Transplant nephrology consulted   - Dialysis T/Th/Sat    Troponinemia  Pt denies any chest pain, shortness of breath.Trop 167-->148, likely 2/2 demand ischemia iso CKD. EKG reviewed, no ST segment changes.  T wave inversion noted in V4 (new from previous EKG).   - CTM       Diet: Advance Diet as Tolerated: Regular Diet Adult; Thin Liquids (level 0)  Adult Formula Drip Feeding: Continuous Other; [employer] Renal 1.8; Jejunostomy; Goal Rate: Goal rate 40 ml/hr. Infuse at 30-39 mL/hr if not tolerating at 40 mL/hr. Formula will be in unit fridge.; mL/hr; See creon and sodium bicarb orders. TF @ i...  Snacks/Supplements Adult: Other; If pt asks for a snack or supplement, please send; With Meals    DVT Prophylaxis: Pneumatic Compression Devices  Dong Catheter: Not present  Lines: PRESENT      CVC Right Tunneled-Site Assessment: WDL      Cardiac Monitoring: None  Code Status: Full Code      Clinically Significant Risk Factors            # Hypomagnesemia: Lowest Mg = 1.5 mg/dL in last 2 days, will replace as needed                       Disposition Plan     Expected Discharge Date: 05/24/2023      Destination: home with family        The patient's care was discussed with the Attending Physician, Dr. Pantoja.    rAacely Lobato  Medicine Service, [facility], [place] TEAM [address] [place] [address]  Securely message with [employer] (more info)  Text page via [facility] Paging/Directory   See signed in provider for up to date coverage information  ______________________________________________________________________    Interval History    Nursing notes reviewed. NAEO.    Pt feels well today. Tolerating 30 mL well.  Attempted 40 mL TF yesterday, discontinued after 6 hours due to pt nausea, and abdominal cramping. Attempted again with similar result, along with episode of diarrhea. Returned to 30 mL overnight. Pt says she had been eating small amount of food, has been having nausea with food. Unable to eat dinner. Ate part of her breakfast.     Physical Exam   Vital Signs: Temp: 97.9  F (36.6  C) Temp src: Oral BP: 104/73 Pulse: 76   Resp: 16 SpO2: 95 % O2 Device: None (Room air) Oxygen Delivery: 2 LPM  Weight: 122 lbs  6.4 oz   Constitutional: awake, alert, cooperative, no apparent distress, and appears stated age  HENT:      Head: Normocephalic and atraumatic.      Right Ear: External ear normal.  Eyes: EOM intact, no icterus  Respiratory: No increased work of breathing, good air exchange, clear to auscultation bilaterally  Cardiovascular: RRR  GI: No scars, normal bowel sounds, soft, non-distended, non-tender, no masses palpated, no hepatosplenomegally. GJ tube in place       Data     Most Recent 3 CBC's:  Recent Labs   Lab Test 05/20/23  0601 05/19/23  0700 05/18/23  0554   WBC 2.4* 2.4* 2.2*   HGB 10.1* 10.9* 10.7*   * 122* 125*    191 201     Most Recent 3 BMP's:  Recent Labs   Lab Test 05/22/23  0608 05/21/23  1026 05/20/23  0916 05/20/23  0601 05/19/23  1207 05/19/23  0700   *  --   --  146*  --  132*   POTASSIUM 4.5  --   --  3.9  --  4.6   CHLORIDE 94*  --   --  103  --  94*   CO2 27  --   --  27  --  29   BUN 36.7*  --   --  22.0  --  14.3   CR 4.42*  --   --  4.84*  --  3.60*   ANIONGAP 11  --   --  16*  --  9   ESTUARDO 8.8  --   --  8.9  --  9.3   * 128* 136* 126*   < > 92    < > = values in this interval not displayed.     Most Recent 2 LFT's:  Recent Labs   Lab Test 05/19/23  0700 05/18/23  0554   AST 22 26   ALT <5* 9*   ALKPHOS 56 48   BILITOTAL 0.6 0.6     Most Recent 3 Creatinines:  Recent Labs   Lab Test 05/22/23  0608 05/20/23  0601 05/19/23  0700   CR 4.42* 4.84* 3.60*     Most Recent 3 Hemoglobins:  Recent Labs   Lab Test 05/20/23  0601 05/19/23  0700 05/18/23  0554   HGB 10.1* 10.9* 10.7*     Most Recent 6 Bacteria Isolates From Any Culture (See EPIC Reports for Culture Details):  Recent Labs   Lab Test 06/14/21  0939   CULT Culture negative for acid fast bacilli  Assayed at [facility] [address] [place] [id number] [phone]  >10 Squamous epithelial cells/low power field indicates oral contamination. Please   recollect.  *  >10 Squamous epithelial cells/low power  field indicates oral contamination. Please   recollect.  *     Most Recent Urinalysis:  Recent Labs   Lab Test 05/18/23  0627   COLOR Yellow   APPEARANCE Slightly Cloudy*   URINEGLC Negative   URINEBILI Negative   URINEKETONE Negative   SG 1.011   UBLD Negative   URINEPH 5.0   PROTEIN 30*   NITRITE Negative   LEUKEST Moderate*   RBCU 3*   WBCU 21*     Most Recent ESR & CRP:  Recent Labs   Lab Test 05/17/23  1657   SED 24   CRPI 16.00*             Imaging results reviewed over the past 24 hrs:   No results found for this or any previous visit (from the past 24 hour(s)).

## 2023-05-22 NOTE — PLAN OF CARE
Temp: 97.9  F (36.6  C) Temp src: Oral BP: 104/73 Pulse: 76   Resp: 16 SpO2: 95 % O2 Device: None (Room air) Oxygen Delivery: 2 LPM    Running: TF at 40mL/hr. TF advanced at 1100. Pt tolerating well    A:   Neuro: A/Ox4. Calls appropriately. Pleasant and cooperative   Cardiac/Tele:  VSS. SR. Afebrile. Denies chest pain   Respiratory: Room air. Tolerating well during day. On 2L NC at night  GI/: Continent. Oliguria. On HD  Diet/Appetite: Regular diet  Skin: No new deficits noted  LDAs: PIV- SL  Activity: Independent in room  Pain: Denies pain    P: Continue to monitor and notify team with changes.

## 2023-05-22 NOTE — PLAN OF CARE
[id number]    VSS. Room air. Denies pain and nausea. Pt reported feeling well in evening and was agreeable to TF rate to be increased back to goal (40ml/hr via J-tube). Poor appetite and declined dinner. Up in room independently. 1 BM this shift per pt report. Oliguric.  PIV saline locked. Dialysis catheter right chest (T,Th, Sa HD). Plan to continue to monitor patient. Notify Maroon 5 with changes or concerns.     Update: Patient reporting nausea around 2230- TF decreased back to 30ml/hr and PRN zofran given.

## 2023-05-22 NOTE — PROGRESS NOTES
Pulmonary Medicine  Cystic Fibrosis - Lung Transplant Team  Progress Note  2023       Patient: Sofie Rodriguez  MRN: [id number]  : 1960 (age 62 year old)  Transplant: 2022 (Lung), POD#[id number]  Admission date: 2023    Assessment & Plan:     Sofie Rodriguez is a 62 year old female with a history of COPD s/p BSLT (22) complicated by post-operative hemidiaphragm palsy, chronic hypercapnia, persistent pleural effusions, recurrent PNAs, positive DSA, EBV viremia, hypogammaglobulinemia, gastroparesis s/p G/J tube placement (22) and pyloric botox (23), GI bleed 2/2 pyloric ulcer, hemobilia s/p ERCP and MRCP, chronic diarrhea, recurrent C diff colitis, and ESRD (on iHD TTSa).  PMH also notable for HFpEF, ASCVD, NTM colonization, hep C, and methamphetamine use.  The patient was admitted on 23 for intolerance of tube feedings with weight loss, neutropenia, and failure to thrive.  Possible plan for discharge tomorrow pending tolerance to current TF rate and PO intake.     Today's recommendations:  - Repeat ImmuKnow assay around time of pulmonary follow up  - Pulmonary follow up tentatively rescheduled for   - Tacrolimus level therapeutic, no dose adjustment, repeat level per OP routine  - Imuran remains on hold for leukopenia   - Repeat DSA in one month (, not yet ordered)  - Tissue transglutaminase Ab IgA () pending  - Routine parasitology stool exam () pending  - Consider limiting suspension medications with concern for osmotic diarrhea  - Follow pending blood cultures () until finalized     S/p bilateral sequential lung transplant (BSLT) for COPD:   Right hemidiaphragm palsy:   Nocturnal hypoxia: Pulmonary symptoms stable.  Recent bronch with BAL  with mild erythema t/o right, mild-moderate t/o left, and minimal secretions t/o.  Fungus on cytology, cultures otherwise negative.  RA during the day with 2L NC overnight (baseline).  ImmuKnow assay 73 on ,  indicative of increased risk of infection (checked in setting of 2-drug IST with [facility]R and recurrent C diff).  - Repeat ImmuKnow assay around time of pulmonary follow up  - 2L NC overnight   - Pulmonary follow up tentatively rescheduled for 6/7     Immunosuppression:  - Tacrolimus 2 mg BID (decreased 5/19).  Goal level 8-12.  Level 5/22 therapeutic at 8.9, no dose adjustment, repeat level per OP routine.  - Imuran on hold since 5/8 for leukopenia  - Prednisone 5 mg qAM / 2.5 mg qPM     Prophylaxis:   - Dapsone qMWF for PJP ppx  - CMV ppx not currently indicated, D+/R+, recent CMV (5/12) negative     Positive DSA: No prior treatment for AMR but has been a concern since pt. initially demonstrated rising DSA post-transplant.  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  Most recently 9100 (5/18).  Also with h/o DR52 Ab (11/2-11/23/22, mfi 846-1072) and CW10 (10/19-12/9/22, mfi 527-1600).    - Repeat in one month (6/18, not yet ordered)     Hypogammaglobulinemia: H/o prior IVIG infusions but most recent IgG adequate at 959 on 5/18.  Repeat as OP.     EBV viremia: Recently 3k in January, most recently <35 (5/12).  Repeat as OP.     Other relevant problems being managed by the primary team:     Severe malnutrition of chronic illness:  Gastroparesis s/p PEG/J and botox:   Pyloric ulcer:  Acute on chronic diarrhea:  Recurrent C diff colitis: Chronic diarrhea since transplant with recurrent episodes of C diff, most recently diagnosed on 5/9 and started on PO vancomycin.  Stool urgency and frequency persisting with tube feedings but decreased by about half when off cycled feeds.  Reports only tolerating about 1-2 hours of cycled tube feeds on average at home d/t nausea (no emesis), cramping, and diarrhea.  No improvement after formula change as OP on 4/18.  Very little PO intake otherwise, reporting similar symptoms in addition to early satiety.  Weight is down ~25# in the past month.   AXR confirmed G/J tube placement.  GI consulted 5/18, see their notes for details, consistent with osmotic diarrhea.  PO vancomycin stopped given no improvement with medication/completed treatment (?colonization).  Calprotectin feces elevated at 210 on 5/18.  Osmolality stool 328 on 5/19.  Transitioned to continuous TF, able to tolerate 30 ml/hr although noted nausea and abdominal cramping with increase to 40 ml/hr.   - Tissue transglutaminase Ab IgA (5/18) pending  - Routine parasitology stool exam (5/20) pending  - Imodium 2 mg QID  - Consider limiting suspension medications with concern for osmotic diarrhea  - Pancreatic enzymes (h/o abnormal fecal elastase) per RD and GI  - TF per RD and GI     Leukopenia: Likely 2/2 medications but persisting despite holding Imuran on 5/8.  Will continue to hold, further management per primary.  - Blood cultures (5/17) NGTD    We appreciate the excellent care provided by the [place] [id number] team.  Recommendations communicated via telephone and this note.  Will continue to follow along closely, please do not hesitate to call with any questions or concerns.    Patient discussed with Dr. Gnog.    RABIA Garcia-C  Inpatient [facility]  Pulmonary CF/Transplant     Subjective & Interval History:     Remains on RA during the day with 2L NC overnight, no acute pulmonary complaints.  Tolerating TF at continuous rate of 30 ml/hr although did note recurrence of abdominal cramping and nausea with rate of 40 ml/hr.  Stools x3 yesterday, initially soft --> loose.  Ate half an omelet yesterday.    Review of Systems:     C: + Tmax 99.3, no chills, + increased weight  INTEGUMENTARY/SKIN: No rash or obvious new lesions  ENT/MOUTH: No sore throat, no sinus pain, no nasal congestion or drainage  RESP: See interval history  CV: No chest pain, no peripheral edema  GI: No vomiting, no reflux symptoms  : No dysuria, + oliguric   MUSCULOSKELETAL: No myalgias, no arthralgias  ENDOCRINE: Blood sugars  with adequate control  NEURO: No headache, no numbness or tingling  PSYCHIATRIC: Mood stable    Physical Exam:     All notes, images, and labs from past 24 hours (at minimum) were reviewed.    Vital signs:  Temp: 97.9  F (36.6  C) Temp src: Oral BP: 104/73 Pulse: 76   Resp: 16 SpO2: 95 % O2 Device: None (Room air) Oxygen Delivery: 2 LPM   Weight: 55.5 kg (122 lb 6.4 oz)  I/O:     Intake/Output Summary (Last 24 hours) at 5/22/2023 1508  Last data filed at 5/22/2023 1400  Gross per 24 hour   Intake 1325 ml   Output --   Net 1325 ml     Constitutional: Lying in bed, in no apparent distress.   HEENT: Eyes with pink conjunctivae, anicteric.  Oral mucosa moist without lesions.   PULM: Good air flow bilaterally.  No crackles, no rhonchi, no wheezes.  Non-labored breathing on RA.  CV: Normal S1 and S2.  RRR.  + systolic murmur.  No peripheral edema.   ABD: NABS, soft, nontender, nondistended.  PEG/J tube site intact, no drainage.    MSK: Moves all extremities.  + muscle wasting.   NEURO: Alert and conversant.   SKIN: Warm, dry.  No rash on limited exam.   PSYCH: Mood stable.     Data:     LABS    CMP:   Recent Labs   Lab 05/22/23  0608 05/21/23  1026 05/20/23  0916 05/20/23  0601 05/19/23  1207 05/19/23  0700 05/18/23  1926 05/18/23  0554 05/17/23  1657   *  --   --  146*  --  132*  --  141 142   POTASSIUM 4.5  --   --  3.9  --  4.6  --  5.0 3.9   CHLORIDE 94*  --   --  103  --  94*  --  103 103   CO2 27  --   --  27  --  29  --  28 28   ANIONGAP 11  --   --  16*  --  9  --  10 11   * 128* 136* 126*   < > 92   < > 83 161*   BUN 36.7*  --   --  22.0  --  14.3  --  18.8 18.0   CR 4.42*  --   --  4.84*  --  3.60*  --  5.79* 5.14*   GFRESTIMATED 11*  --   --  10*  --  14*  --  8* 9*   ESTUARDO 8.8  --   --  8.9  --  9.3  --  9.5 9.7   MAG 1.5*  --   --   --   --   --   --   --  2.0   PHOS 2.7  --   --   --   --   --   --  3.6  --    PROTTOTAL  --   --   --   --   --  6.1*  --  6.3* 7.1   ALBUMIN  --   --   --   --   --   3.7  --  3.7 4.3   BILITOTAL  --   --   --   --   --  0.6  --  0.6 0.6   ALKPHOS  --   --   --   --   --  56  --  48 54   AST  --   --   --   --   --  22  --  26 27   ALT  --   --   --   --   --  <5*  --  9* 12    < > = values in this interval not displayed.     CBC:   Recent Labs   Lab 05/20/23  0601 05/19/23  0700 05/18/23  0554 05/17/23  1657   WBC 2.4* 2.4* 2.2* 2.2*   RBC 2.63* 2.84* 2.79* 3.04*   HGB 10.1* 10.9* 10.7* 11.7   HCT 32.2* 34.7* 34.8* 37.6   * 122* 125* 124*   MCH 38.4* 38.4* 38.4* 38.5*   MCHC 31.4* 31.4* 30.7* 31.1*   RDW 16.6* 16.7* 17.1* 17.3*    191 201 215       INR:   Recent Labs   Lab 05/17/23  1657   INR 0.98       Glucose:   Recent Labs   Lab 05/22/23  0608 05/21/23  1026 05/20/23  0916 05/20/23  0601 05/19/23  1207 05/19/23  0700   * 128* 136* 126* 126* 92       Blood Gas:   Recent Labs   Lab 05/17/23  1703   HCO3V 31*       Culture Data No results for input(s): CULT in the last 168 hours.    Virology Data:   Lab Results   Component Value Date    FLUAH1 Not Detected 04/19/2023    FLUAH3 Not Detected 04/19/2023    GI4714 Not Detected 04/19/2023    IFLUB Not Detected 04/19/2023    RSVA Not Detected 04/19/2023    RSVB Not Detected 04/19/2023    PIV1 Not Detected 04/19/2023    PIV2 Not Detected 04/19/2023    PIV3 Not Detected 04/19/2023    HMPV Not Detected 04/19/2023       Historical CMV results (last 3 of prior testing):  Lab Results   Component Value Date    CMVQNT <137 (A) 04/19/2023    CMVQNT Not Detected 02/28/2023    CMVQNT Not Detected 01/24/2023     Lab Results   Component Value Date    CMVLOG <2.1 04/19/2023    CMVLOG 3.5 01/25/2023    CMVLOG <2.1 12/21/2022       Urine Studies    Recent Labs   Lab Test 05/18/23  0627 09/19/22  2254   URINEPH 5.0 7.0   NITRITE Negative Negative   LEUKEST Moderate* Large*   WBCU 21* 29*       Most Recent [place] Pulmonary Function Testing (FVC/FEV1 only)  FVC-Pre   Date Value Ref Range Status   02/28/2023 1.54 L    01/24/2023  1.56 L    12/21/2022 1.59 L    11/23/2022 1.44 L      FVC-%Pred-Pre   Date Value Ref Range Status   02/28/2023 54 %    01/24/2023 55 %    12/21/2022 56 %    11/23/2022 47 %      FEV1-Pre   Date Value Ref Range Status   02/28/2023 1.51 L    01/24/2023 1.50 L    12/21/2022 1.54 L    11/23/2022 1.41 L      FEV1-%Pred-Pre   Date Value Ref Range Status   02/28/2023 67 %    01/24/2023 66 %    12/21/2022 68 %    11/23/2022 58 %        IMAGING    No results found for this or any previous visit (from the past 48 hour(s)).

## 2023-05-22 NOTE — CONSULTS
Care Management Initial Consult    General Information  Assessment completed with: Sofie Wilder  Type of CM/SW Visit: Initial Assessment    Primary Care Provider verified and updated as needed: Yes   Readmission within the last 30 days:        Reason for Consult: other (see comments) (elevated risk score, lung transplant follow up)  Advance Care Planning: Advance Care Planning Reviewed: verified with patient          Communication Assessment  Patient's communication style: spoken language (English or Bilingual)    Hearing Difficulty or Deaf: no   Wear Glasses or Blind: yes    Cognitive  Cognitive/Neuro/Behavioral: WDL                      Living Environment:   People in home: child(ray), adult  Julia Jackson  Current living Arrangements:  (Pt lives with daughter9 Julia))      Able to return to prior arrangements: yes       Family/Social Support:  Care provided by: child(ray)  Provides care for: no one  Marital Status: Single  Children          Description of Support System: Supportive, Involved    Support Assessment: Adequate family and caregiver support    Current Resources:   Patient receiving home care services: Yes  Skilled Home Care Services:  ([facility])  Community Resources: Dialysis Services, Pulmonary Rehab, Transportation Services  Equipment currently used at home: none  Supplies currently used at home: Oxygen Tubing/Supplies, Enteral Nutrition & Supplies    Employment/Financial:  Employment Status: disabled        Financial Concerns: No concerns identified   Referral to Financial Worker: No       Does the patient's insurance plan have a 3 day qualifying hospital stay waiver?  No    Lifestyle & Psychosocial Needs:  Social Determinants of Health     Tobacco Use: Medium Risk (4/20/2023)    Patient History      Smoking Tobacco Use: Former      Smokeless Tobacco Use: Never      Passive Exposure: Not on file   Alcohol Use: Unknown (2/12/2021)    AUDIT-C      Frequency of Alcohol Consumption: 2-4  times a month      Average Number of Drinks: Not asked      Frequency of Binge Drinking: Not asked   Financial Resource Strain: Not on file   Food Insecurity: Not on file   Transportation Needs: Not on file   Physical Activity: Not on file   Stress: Not on file   Social Connections: Not on file   Intimate Partner Violence: Not on file   Depression: Not at risk (4/4/2023)    PHQ-2      PHQ-2 Score: 1   Housing Stability: Not on file       Functional Status:  Prior to admission patient needed assistance:   Dependent ADLs:: Ambulation-no assistive device  Dependent IADLs:: Independent  Assesssment of Functional Status: At functional baseline    Mental Health Status:  Mental Health Status: No Current Concerns       Chemical Dependency Status:  Chemical Dependency Status: No Current Concerns             Values/Beliefs:  Spiritual, Cultural Beliefs, Anglican Practices, Values that affect care: no               Additional Information:  Patient well known to me from previous hospitalizations and follow up in the lung transplant program.  Patient feeling well today.  Feels they have found the correct rate for her tube feedings and is tolerating them much better. States she has gained 5 pounds since admission.  Has been living at daughter, Julia's house since transplant. Feels that is going very well.  Patient drives herself to grocery stores and medical appointments, but uses transportation services for dialysis.  Patient is utilizing the following services and wishes to resume them at discharge:    [facility]  [address] [place] [address]  [phone]    [facility] for LABS and Spirometry:  Phone: [phone] FAX: [phone]  Patient needs to call to schedule Spirometry     Pulmonary rehab in [facility]  [phone], ext. [phone]  Fax to [phone].        [facility]: Pt has chair time of 11:45 AM T/TH/Sat  Phone number: [phone]  Fax: [phone]     [facility] - [phone]     Home 02 through [employer] [facility]: Fax: [phone] Phone: [phone]  [facility] Refferal: Fax [phone]     Pulmonary rehab at [facility]  Fax to [phone].      [facility]: Pt has chair time of 11:45 AM T/TH/Sat  Phone number: [phone]  Fax: [phone]     Care Coordinator updated and will resume home infusion and dialysis at discharge.    will continue to follow for support, counseling, education and resources.     Mana Valdivia [facility]  Lung Transplant   Phone: [phone] Pager: [phone]

## 2023-05-22 NOTE — PLAN OF CARE
Sofie Rodriguez is a 62 year old female with PMH of COPD s/p b/l lung transplant (6/2022) c/b persistent b/l pleural effusions, recurrent c diff colitis, post operative right phrenic palsy, ASCVD, EBV viremia, Hepatitic C, chronic hypercapnea, gastroparesis s/p GJ tube, ESRD on HD, GI bleed 2/2 pyloric ulcer, hemobilia s/p ERCP and MRCP, admitted with C diff, weight loss, and failure to thrive.    Temp: 97.9  F (36.6  C) Temp src: Oral BP: 117/69 Pulse: 77   Resp: 16 SpO2: 98 % O2 Device: Nasal cannula Oxygen Delivery: 2 LPM    Neuro: AOx4  Cardiac: No tele ordered, regular rhythm  Resp: Sating >90% on RA, 2L NC while sleeping  GI/: Oliguric(on HD), No BM this shift but had two loose/diarrhea previous shift  Diet: Regular diet, continuous TF through GJ at 30ml/hr with goal of 40ml/hr  Activity: Up ad magalie in room  Skin: No new deficits  Pain: Denies  LDA's: R PIV SL, R 2L CVC for HD  Changes/Events: nothing of note  Plan: Continue with POC and contact Chris 5 with any changes/concerns

## 2023-05-23 VITALS
SYSTOLIC BLOOD PRESSURE: 121 MMHG | RESPIRATION RATE: 18 BRPM | TEMPERATURE: 98.4 F | WEIGHT: 126.3 LBS | HEART RATE: 77 BPM | OXYGEN SATURATION: 98 % | BODY MASS INDEX: 23.1 KG/M2 | DIASTOLIC BLOOD PRESSURE: 76 MMHG

## 2023-05-23 LAB
ANION GAP SERPL CALCULATED.3IONS-SCNC: 11 MMOL/L (ref 7–15)
BUN SERPL-MCNC: 55.1 MG/DL (ref 8–23)
CALCIUM SERPL-MCNC: 9.1 MG/DL (ref 8.8–10.2)
CHLORIDE SERPL-SCNC: 92 MMOL/L (ref 98–107)
CREAT SERPL-MCNC: 5.41 MG/DL (ref 0.51–0.95)
DEPRECATED HCO3 PLAS-SCNC: 31 MMOL/L (ref 22–29)
GFR SERPL CREATININE-BSD FRML MDRD: 8 ML/MIN/1.73M2
GLUCOSE SERPL-MCNC: 122 MG/DL (ref 70–99)
MAGNESIUM SERPL-MCNC: 2.2 MG/DL (ref 1.7–2.3)
POTASSIUM SERPL-SCNC: 4.3 MMOL/L (ref 3.4–5.3)
SODIUM SERPL-SCNC: 134 MMOL/L (ref 136–145)

## 2023-05-23 PROCEDURE — 250N000012 HC RX MED GY IP 250 OP 636 PS 637: Performed by: PHYSICIAN ASSISTANT

## 2023-05-23 PROCEDURE — 99233 SBSQ HOSP IP/OBS HIGH 50: CPT | Performed by: NURSE PRACTITIONER

## 2023-05-23 PROCEDURE — 80048 BASIC METABOLIC PNL TOTAL CA: CPT | Performed by: STUDENT IN AN ORGANIZED HEALTH CARE EDUCATION/TRAINING PROGRAM

## 2023-05-23 PROCEDURE — 250N000013 HC RX MED GY IP 250 OP 250 PS 637

## 2023-05-23 PROCEDURE — 36415 COLL VENOUS BLD VENIPUNCTURE: CPT | Performed by: STUDENT IN AN ORGANIZED HEALTH CARE EDUCATION/TRAINING PROGRAM

## 2023-05-23 PROCEDURE — 99238 HOSP IP/OBS DSCHRG MGMT 30/<: CPT | Mod: GC | Performed by: STUDENT IN AN ORGANIZED HEALTH CARE EDUCATION/TRAINING PROGRAM

## 2023-05-23 PROCEDURE — 90937 HEMODIALYSIS REPEATED EVAL: CPT

## 2023-05-23 PROCEDURE — 99233 SBSQ HOSP IP/OBS HIGH 50: CPT | Performed by: PHYSICIAN ASSISTANT

## 2023-05-23 PROCEDURE — 250N000012 HC RX MED GY IP 250 OP 636 PS 637

## 2023-05-23 PROCEDURE — 250N000013 HC RX MED GY IP 250 OP 250 PS 637: Performed by: STUDENT IN AN ORGANIZED HEALTH CARE EDUCATION/TRAINING PROGRAM

## 2023-05-23 PROCEDURE — 258N000003 HC RX IP 258 OP 636: Performed by: HOSPITALIST

## 2023-05-23 PROCEDURE — 83735 ASSAY OF MAGNESIUM: CPT | Performed by: STUDENT IN AN ORGANIZED HEALTH CARE EDUCATION/TRAINING PROGRAM

## 2023-05-23 RX ORDER — SODIUM BICARBONATE 325 MG/1
325 TABLET ORAL EVERY 4 HOURS
Qty: 150 TABLET | Refills: 0 | Status: SHIPPED | OUTPATIENT
Start: 2023-05-23 | End: 2023-06-07

## 2023-05-23 RX ADMIN — TACROLIMUS 2 MG: 5 CAPSULE ORAL at 11:17

## 2023-05-23 RX ADMIN — SODIUM BICARBONATE 325 MG: 325 TABLET ORAL at 11:17

## 2023-05-23 RX ADMIN — SODIUM CHLORIDE 300 ML: 9 INJECTION, SOLUTION INTRAVENOUS at 08:01

## 2023-05-23 RX ADMIN — PANCRELIPASE 2 CAPSULE: 24000; 76000; 120000 CAPSULE, DELAYED RELEASE PELLETS ORAL at 13:22

## 2023-05-23 RX ADMIN — PANCRELIPASE 1 CAPSULE: 24000; 76000; 120000 CAPSULE, DELAYED RELEASE PELLETS ORAL at 02:39

## 2023-05-23 RX ADMIN — SODIUM BICARBONATE 325 MG: 325 TABLET ORAL at 02:39

## 2023-05-23 RX ADMIN — Medication 40 MG: at 11:16

## 2023-05-23 RX ADMIN — PREDNISONE 5 MG: 5 TABLET ORAL at 11:18

## 2023-05-23 RX ADMIN — PANCRELIPASE 1 CAPSULE: 24000; 76000; 120000 CAPSULE, DELAYED RELEASE PELLETS ORAL at 11:17

## 2023-05-23 RX ADMIN — Medication: at 08:02

## 2023-05-23 RX ADMIN — Medication 500 MCG: at 11:18

## 2023-05-23 RX ADMIN — PANCRELIPASE 1 CAPSULE: 24000; 76000; 120000 CAPSULE, DELAYED RELEASE PELLETS ORAL at 06:16

## 2023-05-23 RX ADMIN — LOPERAMIDE HCL 2 MG: 1 SOLUTION ORAL at 11:16

## 2023-05-23 RX ADMIN — Medication 1 PACKET: at 11:22

## 2023-05-23 RX ADMIN — METOPROLOL TARTRATE 25 MG: 25 TABLET, FILM COATED ORAL at 11:18

## 2023-05-23 RX ADMIN — PANCRELIPASE 2 CAPSULE: 24000; 76000; 120000 CAPSULE, DELAYED RELEASE PELLETS ORAL at 08:15

## 2023-05-23 RX ADMIN — SODIUM BICARBONATE 325 MG: 325 TABLET ORAL at 06:16

## 2023-05-23 RX ADMIN — SODIUM CHLORIDE 250 ML: 9 INJECTION, SOLUTION INTRAVENOUS at 08:01

## 2023-05-23 RX ADMIN — LOPERAMIDE HCL 2 MG: 1 SOLUTION ORAL at 13:03

## 2023-05-23 ASSESSMENT — ACTIVITIES OF DAILY LIVING (ADL)
ADLS_ACUITY_SCORE: 20

## 2023-05-23 NOTE — PLAN OF CARE
Status [id number]    /78 (BP Location: Right arm)   Pulse 81   Temp 98.3  F (36.8  C) (Oral)   Resp 16   Wt 57.3 kg (126 lb 4.8 oz)   SpO2 97%   BMI 23.10 kg/m      A&O x4. VSS, afebrile. 2L O2 worn while asleep. Denies pain. Independent. TF running at 30mL/hr per pt request. Q4hr 30mL water flushes. Dialysis scheduled for today.    Continue with plan of care, report changes to Chris 5.

## 2023-05-23 NOTE — PROGRESS NOTES
Pulmonary Medicine  Cystic Fibrosis - Lung Transplant Team  Progress Note  2023       Patient: Sofie Rodriguez  MRN: [id number]  : 1960 (age 62 year old)  Transplant: 2022 (Lung), POD#329  Admission date: 2023    Assessment & Plan:     Sofie Rodriguez is a 62 year old female with a history of COPD s/p BSLT (22) complicated by post-operative hemidiaphragm palsy, chronic hypercapnia, persistent pleural effusions, recurrent PNAs, positive DSA, EBV viremia, hypogammaglobulinemia, gastroparesis s/p G/J tube placement (22) and pyloric botox (23), GI bleed 2/2 pyloric ulcer, hemobilia s/p ERCP and MRCP, chronic diarrhea, recurrent C diff colitis, and ESRD (on iHD TTSa).  PMH also notable for HFpEF, ASCVD, NTM colonization, hep C, and methamphetamine use.  The patient was admitted on 23 for intolerance of tube feedings with weight loss, neutropenia, and failure to thrive.  GI consulted, workup unrevealing outside of suspicion for osmolality contribution to symptoms.  Tolerating continuous tube feedings at reduced rate, currently 30-40 ml/hr, with some PO intake.  Discharging to home today.     Discharge recommendations:  - Pulmonary follow up tentatively rescheduled for   - Labs for OP f/u: CBC, BMP, mag, phos, tacro, ImmuKnow Assay, and DSA  - Imuran remains on hold for leukopenia   - Tissue transglutaminase Ab IgA still pending from , follow as OP  - Imodium to titrate to 2-4 stools daily  - Repeat fecal elastase ordered (please collect prior to discharge) and should discontinue enzymes if normal  - Limit suspension medications as able with concern for osmotic diarrhea  - Pt. to titrate TF up by 5 ml/hr increments as OP as able  - Blood cultures still pending from , follow as OP     S/p bilateral sequential lung transplant (BSLT) for COPD:   Right hemidiaphragm palsy:   Nocturnal hypoxia: Pulmonary symptoms stable.  Recent bronch with BAL  with mild erythema t/o  right, mild-moderate t/o left, and minimal secretions t/o.  Fungus on cytology, cultures otherwise negative.  RA during the day with 2L NC overnight (baseline).  ImmuKnow assay 73 on 5/18, indicative of increased risk of infection (checked in setting of 2-drug IST with [facility] and recurrent C diff).  - Repeat ImmuKnow assay with pulmonary follow up   - Pulmonary follow up tentatively rescheduled for 6/7     Immunosuppression:  - Tacrolimus 2 mg BID (decreased 5/19).  Goal level 8-12.  Repeat level per OP routine.  - Imuran on hold since 5/8 for leukopenia  - Prednisone 5 mg qAM / 2.5 mg qPM     Prophylaxis:   - Dapsone qMWF for PJP ppx  - CMV ppx not currently indicated, D+/R+, recent CMV (5/12) negative     Positive DSA: No prior treatment for AMR but has been a concern since pt. initially demonstrated rising DSA post-transplant.  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  Most recently 9100 (5/18).  Also with h/o DR52 Ab (11/2-11/23/22, mfi 846-1072) and CW10 (10/19-12/9/22, mfi 527-1600).    - Repeat DSA at OP f/u     Hypogammaglobulinemia: H/o prior IVIG infusions but most recent IgG adequate at 959 on 5/18.       EBV viremia: Recently 3k in January, most recently <35 (5/12).     Other relevant problems being managed by the primary team:     Severe malnutrition of chronic illness:  Gastroparesis s/p PEG/J and botox:   Pyloric ulcer:  Acute on chronic diarrhea:  Recurrent C diff colitis: Chronic diarrhea since transplant with recurrent episodes of C diff, most recently diagnosed on 5/9 and started on PO vancomycin.  Stool urgency and frequency persisting with tube feedings but decreased by about half when off cycled feeds.  Reports only tolerating about 1-2 hours of cycled tube feeds on average at home d/t nausea (no emesis), cramping, and diarrhea.  No improvement after formula change as OP on 4/18.  Very little PO intake otherwise, reporting similar symptoms in addition  to early satiety.  Weight is down ~25# in the past month.  AXR confirmed G/J tube placement.  O&P negative on admission.  GI consulted 5/18, see their notes for details, consistent with osmotic diarrhea.  PO vancomycin stopped given no improvement with medication/completed treatment (?colonization).  Calprotectin feces elevated at 210 on 5/18.  Osmolality stool 328 on 5/19.  Transitioned to continuous TF, able to tolerate 30-40 ml/hr with mild nausea and abdominal cramping.   - Tissue transglutaminase Ab IgA (5/18) pending  - Imodium 2 mg QID, titrate to 2-4 stools daily  - Pancreatic enzymes (h/o slightly abnormal fecal elastase) per RD and GI, repeat fecal elastase ordered (please collect prior to discharge) and should discontinue enzymes if normal  - Limit suspension medications as able with concern for osmotic diarrhea  - Pt. to titrate TF up by 5 ml/hr increments as OP as able     Leukopenia: Likely 2/2 medications but persisting despite holding Imuran on 5/8.  Will continue to hold, further management per primary.  Blood cultures (5/17) NGTD, follow as OP.     We appreciate the excellent care provided by the [place] [id number] team.  Recommendations communicated via telephone and this note.  Will continue to follow along closely, please do not hesitate to call with any questions or concerns.     Patient discussed with Dr. Gong.    Catherine Johnson, DNP, APRN, CNP  Inpatient Nurse Practitioner  Pulmonary CF/Transplant     Subjective & Interval History:     Continues without pulmonary symptoms, stable on RA.  Tolerated 6 hours of continuous feeds at 40 ml/hr, then with some return of nausea and abdominal cramping that resolved with decrease in rate to 30 ml/hr.  iHD today, goal UF 2.3L, tolerating well.    Review of Systems:     C: No fever, no chills  INTEGUMENTARY/SKIN: No rash or obvious new lesions  ENT/MOUTH: No sore throat, no sinus pain, no nasal congestion or drainage  RESP: See interval history  CV: No  chest pain, no palpitations, no peripheral edema, no orthopnea  GI: No vomiting, no change in stools, no reflux symptoms  : No dysuria  MUSCULOSKELETAL: No myalgias, no arthralgias  ENDOCRINE: Blood sugars with adequate control  NEURO: No headache, no numbness or tingling  PSYCHIATRIC: Mood stable    Physical Exam:     All notes, images, and labs from past 24 hours (at minimum) were reviewed.    Vital signs:  Temp: 98.4  F (36.9  C) Temp src: Oral BP: 123/79 Pulse: 80   Resp: 11 SpO2: 94 % O2 Device: None (Room air) Oxygen Delivery: 2 LPM   Weight: 57.3 kg (126 lb 4.8 oz)  I/O:     Intake/Output Summary (Last 24 hours) at 5/23/2023 0855  Last data filed at 5/23/2023 0700  Gross per 24 hour   Intake 1030 ml   Output 50 ml   Net 980 ml     Constitutional: Lying in bed during iHD, in no apparent distress.   HEENT: Eyes with pink conjunctivae, anicteric.  Oral mucosa moist without lesions.   PULM: Good air flow bilaterally.  No crackles, no rhonchi, no wheezes.  Non-labored breathing on RA.  CV: Normal S1 and S2.  RRR.  No murmur, gallop, or rub.  No peripheral edema.   ABD: NABS, soft, nontender, nondistended.    MSK: Moves all extremities.  No apparent muscle wasting.   NEURO: Alert and conversant.   SKIN: Warm, dry.  No rash on limited exam.   PSYCH: Mood stable.     Data:     LABS    CMP:   Recent Labs   Lab 05/23/23  0622 05/22/23  0608 05/21/23  1026 05/20/23  0916 05/20/23  0601 05/19/23  1207 05/19/23  0700 05/18/23  1926 05/18/23  0554 05/17/23  1657   * 132*  --   --  146*  --  132*  --  141 142   POTASSIUM 4.3 4.5  --   --  3.9  --  4.6  --  5.0 3.9   CHLORIDE 92* 94*  --   --  103  --  94*  --  103 103   CO2 31* 27  --   --  27  --  29  --  28 28   ANIONGAP 11 11  --   --  16*  --  9  --  10 11   * 107* 128* 136* 126*   < > 92   < > 83 161*   BUN 55.1* 36.7*  --   --  22.0  --  14.3  --  18.8 18.0   CR 5.41* 4.42*  --   --  4.84*  --  3.60*  --  5.79* 5.14*   GFRESTIMATED 8* 11*  --   --  10*   --  14*  --  8* 9*   ESTUARDO 9.1 8.8  --   --  8.9  --  9.3  --  9.5 9.7   MAG  --  1.5*  --   --   --   --   --   --   --  2.0   PHOS  --  2.7  --   --   --   --   --   --  3.6  --    PROTTOTAL  --   --   --   --   --   --  6.1*  --  6.3* 7.1   ALBUMIN  --   --   --   --   --   --  3.7  --  3.7 4.3   BILITOTAL  --   --   --   --   --   --  0.6  --  0.6 0.6   ALKPHOS  --   --   --   --   --   --  56  --  48 54   AST  --   --   --   --   --   --  22  --  26 27   ALT  --   --   --   --   --   --  <5*  --  9* 12    < > = values in this interval not displayed.     CBC:   Recent Labs   Lab 05/20/23  0601 05/19/23  0700 05/18/23  0554 05/17/23  1657   WBC 2.4* 2.4* 2.2* 2.2*   RBC 2.63* 2.84* 2.79* 3.04*   HGB 10.1* 10.9* 10.7* 11.7   HCT 32.2* 34.7* 34.8* 37.6   * 122* 125* 124*   MCH 38.4* 38.4* 38.4* 38.5*   MCHC 31.4* 31.4* 30.7* 31.1*   RDW 16.6* 16.7* 17.1* 17.3*    191 201 215       INR:   Recent Labs   Lab 05/17/23  1657   INR 0.98       Glucose:   Recent Labs   Lab 05/23/23  0622 05/22/23  0608 05/21/23  1026 05/20/23  0916 05/20/23  0601 05/19/23  1207   * 107* 128* 136* 126* 126*       Blood Gas:   Recent Labs   Lab 05/17/23  1703   HCO3V 31*       Culture Data No results for input(s): CULT in the last 168 hours.    Virology Data:   Lab Results   Component Value Date    FLUAH1 Not Detected 04/19/2023    FLUAH3 Not Detected 04/19/2023    PA8389 Not Detected 04/19/2023    IFLUB Not Detected 04/19/2023    RSVA Not Detected 04/19/2023    RSVB Not Detected 04/19/2023    PIV1 Not Detected 04/19/2023    PIV2 Not Detected 04/19/2023    PIV3 Not Detected 04/19/2023    HMPV Not Detected 04/19/2023       Historical CMV results (last 3 of prior testing):  Lab Results   Component Value Date    CMVQNT <137 (A) 04/19/2023    CMVQNT Not Detected 02/28/2023    CMVQNT Not Detected 01/24/2023     Lab Results   Component Value Date    CMVLOG <2.1 04/19/2023    CMVLOG 3.5 01/25/2023    CMVLOG <2.1 12/21/2022        Urine Studies    Recent Labs   Lab Test 05/18/23  0627 09/19/22  2254   URINEPH 5.0 7.0   NITRITE Negative Negative   LEUKEST Moderate* Large*   WBCU 21* 29*       Most Recent [place] Pulmonary Function Testing (FVC/FEV1 only)  FVC-Pre   Date Value Ref Range Status   02/28/2023 1.54 L    01/24/2023 1.56 L    12/21/2022 1.59 L    11/23/2022 1.44 L      FVC-%Pred-Pre   Date Value Ref Range Status   02/28/2023 54 %    01/24/2023 55 %    12/21/2022 56 %    11/23/2022 47 %      FEV1-Pre   Date Value Ref Range Status   02/28/2023 1.51 L    01/24/2023 1.50 L    12/21/2022 1.54 L    11/23/2022 1.41 L      FEV1-%Pred-Pre   Date Value Ref Range Status   02/28/2023 67 %    01/24/2023 66 %    12/21/2022 68 %    11/23/2022 58 %        IMAGING    No results found for this or any previous visit (from the past 48 hour(s)).

## 2023-05-23 NOTE — PROGRESS NOTES
Discharge date: 05/23/23 ([facility])  Discharge therapies to be provided by [facility]: Enteral  Formula: Eneida Stoll renal  Rate/Dose: 40ml/hr continously  Provider to manage enteral at home: Same provider prior to admission  Estimated length of need: 90 days or more  Education completed: None needed. Pt reported that she feels comfortable with the enteral.  Pt did enteral prior to admission,  Delivery/supplies: Supplies to pt's daughter's home. Pt reports that she has enough enteral supplies at home.   Agency: NA  SNV: NA  Notes: Pt will be staying at her daughter's home (noted in popup in [facility]).  Spoke to pt over the phone and she agreed with provider can be disconnected from feeding and reapply once home.  Pt stated she has no questions or concerns in regards to her enteral feeding at this time.     Kaye Siddiqui LPN  Enteral Nurse Liaison  [facility]  [address] [place] [address]  [phone]

## 2023-05-23 NOTE — PROGRESS NOTES
Nephrology Progress Note  05/23/2023         Assessment & Recommendations:   Sofie Rodriguez is a 62 year old female with PMH of COPD s/p b/l lung transplant (6/2022) c/b persistent b/l pleural effusions, recurrent c diff colitis, post operative right phrenic palsy, ASCVD, EBV viremia, Hepatitic C, chronic hypercapnea, gastroparesis s/p GJ tube, ESRD on HD, GI bleed 2/2 pyloric ulcer, hemobilia s/p ERCP and MRCP, admitted with C diff, weight loss, and failure to thrive. Doing much better now, tolerating tube feeds    # ESKD: dialyzes TTS at [facility] with Dr. Fairbanks. Access: TDC, had LUE AVF placed 2/17/23, transposition surgery scheduled 6/9/23). EDW 56.1 kg (though recent wt of 54.2 kg). Run time: 3 hrs  - Dialysis per TTS schedule    # BP/volume: has been losing wt, EDW 54.2 kg most recently; normotensive on metoprolol 25 mg bid; ongoing c-diff diarrheal losses, poor PO intake  - will attempt gentle UF given e/o overload with persistent L pleural effusion (2 kg off today)      # Anemia of CKD: hgb 10's; on mirera 50 mcg [id number], has just completed iron loading  - no indication for JOCELINE, hgb > 10    # BMD: Ca normal, alb 3.7, phos 2.7, recent   - on regular diet    Recommendations were communicated to primary team via this note    RABIA Moctezuma   Division of Renal Disease and Hypertension  [phone]    Recommendations were communicated to primary team via this note         RABIA Moctezuma   Division of Renal Disease and Hypertension  [phone]    Interval History :   Seen on dialysis, pt reports feeling much better, tolerating tube feeds, hoping to go home soon. No n/v, CP, SOB, chills currently    Review of Systems:   4 point ROS neg other than as noted above    Physical Exam:   I/O last 3 completed shifts:  In: 1360 [P.O.:660; NG/GT:120]  Out: 50 [Urine:50]   /76   Pulse 77   Temp 98.4  F (36.9  C) (Oral)   Resp 18   Wt 57.3 kg (126 lb 4.8 oz)   SpO2 98%   BMI 23.10  kg/m       GENERAL APPEARANCE: alert, NAD  EYES: no scleral icterus, pupils equal  Pulmonary: CTA  CV: RRR   - Edema none  GI: soft   MS: no evidence of inflammation in joints, no muscle tenderness  : no montgomery  SKIN: no rash, warm, dry, no cyanosis  NEURO: face symmetric, a/o3  Access: TDC    Labs:   All labs reviewed by me  Electrolytes/Renal - Recent Labs   Lab Test 05/23/23  0622 05/22/23  0608 05/21/23  1026 05/20/23  0916 05/20/23  0601 05/18/23  1926 05/18/23  0554 05/17/23  1657 09/28/22  0809 09/28/22  0514   * 132*  --   --  146*   < > 141 142   < > 133*   POTASSIUM 4.3 4.5  --   --  3.9   < > 5.0 3.9   < > 4.2   CHLORIDE 92* 94*  --   --  103   < > 103 103   < > 95*   CO2 31* 27  --   --  27   < > 28 28   < > 32*   BUN 55.1* 36.7*  --   --  22.0   < > 18.8 18.0   < > 39.2*   CR 5.41* 4.42*  --   --  4.84*   < > 5.79* 5.14*   < > 1.44*   * 107* 128*   < > 126*   < > 83 161*   < > 143*   ESTUARDO 9.1 8.8  --   --  8.9   < > 9.5 9.7   < > 8.9   MAG 2.2 1.5*  --   --   --   --   --  2.0   < > 2.0   PHOS  --  2.7  --   --   --   --  3.6  --   --  3.2    < > = values in this interval not displayed.       CBC -   Recent Labs   Lab Test 05/20/23  0601 05/19/23  0700 05/18/23  0554   WBC 2.4* 2.4* 2.2*   HGB 10.1* 10.9* 10.7*    191 201       LFTs -   Recent Labs   Lab Test 05/19/23  0700 05/18/23  0554 05/17/23  1657   ALKPHOS 56 48 54   BILITOTAL 0.6 0.6 0.6   ALT <5* 9* 12   AST 22 26 27   PROTTOTAL 6.1* 6.3* 7.1   ALBUMIN 3.7 3.7 4.3       Iron Panel -   Recent Labs   Lab Test 09/26/22  0555 09/03/22  1039 08/24/22  0810   IRON 54 21* 41   IRONSAT 22 9* 21   CARLOS 769* 343* 334*         Imaging:  Reviewed    Current Medications:    cyanocobalamin  500 mcg Per Feeding Tube Daily     dapsone  50 mg Per J Tube Q Mon Wed Fri AM     fiber modular (NUTRISOURCE FIBER)  1 packet Per Feeding Tube TID w/meals     sodium bicarbonate  325 mg Per Feeding Tube Q4H    And     lipase-protease-amylase  1-2  capsule Per Feeding Tube Q4H     lipase-protease-amylase  2 capsule Oral TID w/meals     loperamide  2 mg Per J Tube 4x Daily     metoprolol tartrate  25 mg Per Feeding Tube BID     pantoprazole  40 mg Per J Tube BID     predniSONE  2.5 mg Oral or Feeding Tube QPM     predniSONE  5 mg Oral QAM     sodium chloride (PF)  3 mL Intracatheter Q8H     tacrolimus  2 mg Oral or Feeding Tube BID IS       dextrose       dextrose       RABIA Moctezuma

## 2023-05-23 NOTE — PLAN OF CARE
Goal Outcome Evaluation:  D:pt tolerated tube feeding at 40cc/hr from 11 am till 6 pm. Pt ate 50% meal  I:pt fiber was held secondary to diarrhea  A:pt has not had diarrhea since beginning of shift  P:contine with tube feeding per patient request at 30cc/ hr

## 2023-05-23 NOTE — PLAN OF CARE
Neuro: A&Ox4.   Cardiac: SR. VSS.   Respiratory: Sating 98 on RA.  GI/: Oliguric  No bm this shift.   Diet/appetite: Tolerating regular diet. Continuus TF 30ml/hr. Goal is 40ml/hr.  Activity:  INdependent up to bathroom  Pain: denies  Skin: No new deficits noted.  LDA's:  CVC - HL  Removed PIV     Plan:   DISCHARGE                         No discharge date for patient encounter.  ----------------------------------------------------------------------------  Discharged to: Home  Via: private transportation  Discharge Instructions: *diet, *activity, medications, follow up appointments, when to call the MD, aftercare instructions.  Prescriptions: To be filled by pharmacy; medication list reviewed & sent with pt  Follow Up Appointments: arranged; information given  Belongings: All sent with pt  IV: d/c'd  Telemetry: d/c'd  Pt exhibits understanding of above discharge instructions; all questions answered.    Discharge Paperwork: Signed, copied, and sent home with patient.

## 2023-05-23 NOTE — DISCHARGE SUMMARY
[facility]  Hospitalist Discharge Summary      Date of Admission:  5/17/2023  Date of Discharge:  5/23/2023  Discharging Provider: Aracely Lobato  Discharge Service: Medicine Service, NILE TEAM 5    Discharge Diagnoses   Diarrhea, osmotic,   Initial concern for C. Diff colitis, concern resolved   Severe gatroparesis s/p NG tube placement and EGD with botox  Feeding intolerance   Weight loss  Pancreatic insufficiency  S/p bilateral Lung transplant 6/28/22  Persistent Left pleural effusion  Leukopenia  Neutropenia  ESRD on HD   Troponinemia    Clinically Significant Risk Factors          Follow-ups Needed After Discharge   Follow up with Nutrition as outpatient, referral is in place.    Follow up with Transplant Pulmonology in clinic, tentative plan for 6/7/23    Unresulted Labs Ordered in the Past 30 Days of this Admission     Date and Time Order Name Status Description    5/18/2023  1:59 PM Tissue transglutaminase antibody IgA In process     4/19/2023  8:46 AM Acid-Fast Bacilli Culture and Stain In process       These results will be followed up by PCP    Discharge Disposition   Discharged to home  Condition at discharge: Stable    Hospital Course   Sofie Rodriguez is a 62 year old female with PMH of bilateral lung transplant 6/28/2022 for COPD c/b persistent b/l pleural effusions, post operative right phrenic palsy, ASCVD, EBV viremia,, chronic hypercapnea, gastroparesis s/p GJ tube, ESRD on HD, GI bleed 2/2 pyloric ulcer, hemobilia s/p ERCP admitted on 5/17/2023 with 5 weeks of persistent diarrhea, weight loss, tube feed intolerance. Initial outpatient workup 3 weeks ago concerning for possible c.diff colitis and pt  completed a course of vancomycin. GI consulted this admission with low suspicion of c diff colitis given lack of positive toxin and direct association of diarrhea, nausea, and cramping with tube feeds; suspected osmotic diarrhea 2/2 malabsorption.  Started continuous trickle feeds with symptomatic improvement. Pt able to consistently tolerate 30 mL/hr rate 24 hour TF while inpatient, supplemented with PO intake. Has had net 6 lb increase since admission. Plan to continue continuous 30 ml/hr TF while outpatient until follow up with nutrition, patient can increase to 40 ml/hr if she is able to tolerate it otherwise will stay at 30 ml/hr.    Diarrhea, osmotic, improving   C. Diff colitis, resolved   Severe gatroparesis s/p NG tube placement and EGD with botox  Feeding intolerance   Weight loss  Pt reports 5 weeks of diarrhea accompanied with 24 lb weight loss. Outpatient workup showed negative enteric panel, parasites, giardia CMV undetected on 5/12, EBV <35. Found to be c diff positive on PCR on 5/8 and initiated on vancomycin. Pt reports some symptomatic improvement but persistent diarrhrea, nausea, and lower abdominal discomfort associated with tube feeds only. No such symptoms with po intake. No improvement with recent formula change. AXR shows proper placement of GJ tube. Per GI, suspect osmotic diarrhea 2/2 malabsorption in context of gastroparesis, fecal osm gap of 100. Discontinued vancomycin due to low suspicion for c diff given diarrhea directly related to tube feeds. Started 24 hr TF with patient tolerating 30mL/hr rate. Nutrition consulted and recommended gradually increase TF rate to goal of 40 mL/hr, as tolerated with goal for pt to consume at least 300-400 kcals/day orally.   - Nutrition follow up     Pancreatic insufficiency  Pt found to have pancreatic elastase pf 198 with slight to moderate pancreatic insufficiency.  Nutrition recommended starting Creon. GI recommending repeat fecal elastase to confirm insufficiency given borderline value, could be falsely low in context of diarrhea.   - Repeat fecal elastase, follow up as outpatient   - Creon TID w/meals as well as Q4H with TF    S/p bilateral Lung transplant 6/28/22  Persistent Left pleural  effusion  Pt underwent b/l lung transplant 6/22 due to COPD c/b persistent bilateral pleural effusion. CMV 5/12 undetected, EBV <35. CXR reveals small left pleural effusion (unchanged from prior) with clear, hyperinflated lungs. WBC 2.2. Procal 3.4 but unreliable iso ESRD. CRP mildly elevated at 16. Influenza/RSV/Covid negative. VBG 7.37/53/64/31  - Pulmonary transplant follow up in 2 weeks (tentatively on 6/7/23)    Leukopenia  Neutropenia  WBC 2.2, was 4.2 two months ago. ANC 1.3 at admission iso current C.diff infection. Neutropenia etiology unclear with last white count 1.8 with ANC by report of 0.8 or 0.9 for which patient received Neupogen. Off azathioprine  - Pulmonary transplant follow up in 2 weeks      ESRD on HD T/Th/Sat  Cr 5.14 on admission was 2.96 - 8.27 in past 2-3 months. On HD.   -  Dialysis T/Th/Sat     Troponinemia  Pt denies any chest pain, shortness of breath.Trop 167-->148, likely 2/2 demand ischemia iso CKD. EKG reviewed, no ST segment changes. T wave inversion noted in V4 (new from previous EKG). Has continued to be assymptomatic throughout hospital stay.       Consultations This Hospital Stay   NEPHROLOGY ESRD ADULT IP CONSULT  NUTRITION SERVICES ADULT IP CONSULT  PULMONARY CF/TRANSPLANT ADULT IP CONSULT  GI LUMINAL ADULT IP CONSULT  PHARMACY IP CONSULT  PHARMACY IP CONSULT  CARE MANAGEMENT / SOCIAL WORK IP CONSULT  OCCUPATIONAL THERAPY ADULT IP CONSULT    Code Status   Full Code    Time Spent on this Encounter   IGerardo MD, personally saw the patient today and spent greater than 30 minutes discharging this patient.       Aracely Lobato  formerly [facility] [address] [facility]  [address] [place] [id number]  Phone: [phone]  ______________________________________________________________________    Physical Exam   Vital Signs: Temp: 98.4  F (36.9  C) Temp src: Oral BP: 121/76 Pulse: 77   Resp: 18 SpO2: 98 % O2 Device: None (Room air)    Weight: 126 lbs 4.8 oz    Constitutional: awake, alert, cooperative, no apparent distress, and appears stated age  HENT:      Head: Normocephalic and atraumatic.      Right Ear: External ear normal.  Eyes: EOM intact, no icterus  Respiratory: No increased work of breathing, good air exchange, clear to auscultation bilaterally  Cardiovascular: RRR  GI: No scars, normal bowel sounds, soft, non-distended, non-tender, no masses palpated, no hepatosplenomegally. GJ tube in place           Primary Care Physician   Vinny Berrios    Discharge Orders       Significant Results and Procedures   Most Recent 3 CBC's:Recent Labs   Lab Test 05/20/23  0601 05/19/23  0700 05/18/23  0554   WBC 2.4* 2.4* 2.2*   HGB 10.1* 10.9* 10.7*   * 122* 125*    191 201     Most Recent 3 BMP's:Recent Labs   Lab Test 05/23/23  0622 05/22/23  0608 05/21/23  1026 05/20/23  0916 05/20/23  0601   * 132*  --   --  146*   POTASSIUM 4.3 4.5  --   --  3.9   CHLORIDE 92* 94*  --   --  103   CO2 31* 27  --   --  27   BUN 55.1* 36.7*  --   --  22.0   CR 5.41* 4.42*  --   --  4.84*   ANIONGAP 11 11  --   --  16*   ESTUARDO 9.1 8.8  --   --  8.9   * 107* 128*   < > 126*    < > = values in this interval not displayed.     Most Recent 2 LFT's:Recent Labs   Lab Test 05/19/23  0700 05/18/23  0554   AST 22 26   ALT <5* 9*   ALKPHOS 56 48   BILITOTAL 0.6 0.6     Most Recent 6 glucoses:Recent Labs   Lab Test 05/23/23  0622 05/22/23  0608 05/21/23  1026 05/20/23  0916 05/20/23  0601 05/19/23  1207   * 107* 128* 136* 126* 126*     Most Recent Urinalysis:Recent Labs   Lab Test 05/18/23  0627   COLOR Yellow   APPEARANCE Slightly Cloudy*   URINEGLC Negative   URINEBILI Negative   URINEKETONE Negative   SG 1.011   UBLD Negative   URINEPH 5.0   PROTEIN 30*   NITRITE Negative   LEUKEST Moderate*   RBCU 3*   WBCU 21*     Most Recent ESR & CRP:Recent Labs   Lab Test 05/17/23  1657   SED 24   CRPI 16.00*   ,   Results for orders placed or performed during the hospital  encounter of 05/17/23   XR Chest 2 Views    Narrative    EXAM: XR CHEST 2 VIEWS  5/17/2023 5:20 PM     HISTORY:  dyspnea, s/p lung transplant       COMPARISON:  Chest radiograph 2/28/2023    FINDINGS:   PA and lateral views of the upper chest. Right central venous  dual-lumen central venous catheter with tip projecting over the high  right atrium. Postoperative changes of lung transplantation with  clamshell sternotomy wires. Calcifications of the aortic knob. Trachea  is mildly deviated to the right. Unchanged left costophrenic angle  blunting. Right costophrenic angle is clear. No pneumothorax. . No  focal consolidative opacity Partially visualized upper abdomen is  unremarkable. No acute osseous abnormality.      Impression    IMPRESSION:   Postoperative changes of prior bilateral lung transplantation.  Unchanged left pleural effusion/atelectasis. No acute airspace opacity    I have personally reviewed the examination and initial interpretation  and I agree with the findings.    NIKOS JAVED MD         SYSTEM ID:  [id number]   XR Abdomen Port 1 View    Narrative    Exam: XR ABDOMEN PORT 1 VIEW, 5/17/2023 10:14 PM    Indication: Pt with active C. diff. Please assess GJ tube placement    Comparison: 11/23/2022    Findings:   Single portable AP supine radiograph of the abdomen. Percutaneous  gastrojejunostomy tube in place with the balloon inflated in the left  lower quadrant in similar position as comparison radiograph and likely  projecting over the stomach with distal end of the tube tip projecting  over the expected location of the jejunum. Partially visualized  central venous catheter tip projects over the right atrium. Clamshell  sternotomy wires.    No obstructive bowel gas pattern. No pneumatosis or portal venous gas.  Lung bases are clear. No acute osseous abnormality.      Impression    Impression:  Percutaneous GJ balloon projects over the left lower  quadrant in expected location of the stomach, distal  end of the tube  tip projects over the expected location of the jejunum.    I have personally reviewed the examination and initial interpretation  and I agree with the findings.    NIKOS JAVED MD         SYSTEM ID:  [id number]     *Note: Due to a large number of results and/or encounters for the requested time period, some results have not been displayed. A complete set of results can be found in Results Review.       Discharge Medications   Current Discharge Medication List      START taking these medications    Details   !! lipase-protease-amylase (CREON 24) 46467-21236-387313 units CPEP per EC capsule Take 2 capsules by mouth 3 times daily (with meals)  Qty: 180 capsule, Refills: 0    Associated Diagnoses: Osmotic diarrhea      !! lipase-protease-amylase (CREON 24) 11466-84578-239724 units CPEP per EC capsule 1-2 capsules by Per Feeding Tube route every 4 hours  Qty: 300 capsule, Refills: 0    Associated Diagnoses: Osmotic diarrhea      sodium bicarbonate 325 MG tablet 1 tablet (325 mg) by Per Feeding Tube route every 4 hours  Qty: 150 tablet, Refills: 0    Associated Diagnoses: Osmotic diarrhea       !! - Potential duplicate medications found. Please discuss with provider.      CONTINUE these medications which have NOT CHANGED    Details   alcohol swab prep pads Use to swab area of injection/amos as directed.  Qty: 100 each, Refills: 6    Associated Diagnoses: Steroid-induced hyperglycemia      azaTHIOprine (IMURAN) 5 mg/mL SUSP 20 mLs (100 mg) by Per J Tube route daily ON HOLD 5/8/2023 FOR LOW WBC  Qty: 600 mL, Refills: 11    Comments: TXP DT 6/28/2022 (Lung) TXP Dischg DT  DX Lung replaced by transplant Z94.2 TX Center [facility] ([place], [place])  Associated Diagnoses: S/P lung transplant (H)      blood glucose (CONTOUR NEXT TEST) test strip Use to test blood sugar 4 times daily, as directed. Okay to use whatever brand insurance will cover.  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 Okay to use whatever brand insurance will cover.  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  Qty: 1 kit, Refills: 0    Associated Diagnoses: Steroid-induced hyperglycemia      calcium carbonate 600 mg-vitamin D 400 units (CALTRATE) 600-400 MG-UNIT per tablet 1 tablet by Per J Tube route 2 times daily (with meals)  Qty: 60 tablet, Refills: 11    Associated Diagnoses: Diaphragm dysfunction      cyanocobalamin (CYANOCOBALAMIN) 500 MCG tablet 1 tablet (500 mcg) by Per Feeding Tube route daily  Qty: 30 tablet, Refills: 11    Associated Diagnoses: Anemia of chronic renal failure, stage 3b (H)      dapsone 2 mg/mL SUSP 25 mLs (50 mg) by Per J Tube route Every Mon, Wed, Fri Morning  Qty: 1000 mL, Refills: 11    Associated Diagnoses: S/P lung transplant (H)      Guar Gum (FIBER MODULAR, NUTRISOURCE FIBER,) packet 1 packet by Per Feeding Tube route 3 times daily (with meals)  Qty: 75 each, Refills: 3    Associated Diagnoses: Anemia of chronic renal failure, stage 3b (H)      insulin pen needle (32G X 4 MM) 32G X 4 MM miscellaneous Use 4 pen needles daily or as directed.  Qty: 200 each, Refills: 11    Associated Diagnoses: S/P lung transplant (H)      loperamide (IMODIUM A-D) 2 MG tablet 1 tablet (2 mg) by Per J Tube route 4 times daily as needed for diarrhea    Associated Diagnoses: Functional diarrhea      metoprolol tartrate (LOPRESSOR) 50 MG tablet 0.5 tablets (25 mg) by Per Feeding Tube route 2 times daily  Qty: 60 tablet, Refills: 11    Associated Diagnoses: S/P lung transplant (H)      Nutritional Supplements  (GLUCOSE MANAGEMENT) TABS Take 3-4 tablets by mouth as needed (hypoglycemia) Pharmacist may change instructions to fit whatever glucose tab product they have in stock.  Qty: 120 tablet, Refills: 1    Associated Diagnoses: Steroid-induced hyperglycemia      pantoprazole (PROTONIX) 2 mg/mL SUSP suspension 20 mLs (40 mg) by Per J Tube route 2 times daily  Qty: 1200 mL, Refills: 11    Associated Diagnoses: S/P lung transplant (H)      predniSONE (DELTASONE) 5 MG tablet Take 1 tab (5mg) at AM and 1/2 tab (2.5) in pm  Qty: 135 tablet, Refills: 3    Comments: TXP DT 6/28/2022 (Lung) TXP Dischg DT  DX Lung replaced by transplant Z94.2 [facility] ([place], [place])  Associated Diagnoses: Lung replaced by transplant (H)      protein modular (PROSOURCE TF) LIQD 1 packet by Per Feeding Tube route daily  Qty: 30 packet, Refills: 11    Associated Diagnoses: S/P lung transplant (H)      Sharps Container MISC 1 sharps container  Qty: 1 each, Refills: 0    Associated Diagnoses: Steroid-induced hyperglycemia      tacrolimus (GENERIC EQUIVALENT) 1 mg/mL suspension Take 3 mLs (3 mg) by mouth every morning AND 2.5 mLs (2.5 mg) every evening. Total dose: 3mg in AM and 2.5mg in PM  Qty: 180 mL, Refills: 11    Comments: TXP DT 6/28/2022 (Lung) TXP Dischg DT  DX Lung replaced by transplant Z94.2 [facility] ([place], [place])  Associated Diagnoses: S/P lung transplant (H)      vancomycin (FIRVANQ) 50 MG/ML oral solution Take 2.5 mLs (125 mg) by mouth 4 times daily for 10 days, THEN 2.5 mLs (125 mg) 2 times daily for 7 days, THEN 2.5 mLs (125 mg) daily for 7 days, THEN 2.5 mLs (125 mg) every other day for 14 days.  Qty: 450 mL, Refills: 0    Associated Diagnoses: C. difficile diarrhea         STOP taking these medications       filgrastim-sndz (ZARXIO) 300 MCG/0.5ML syringe Comments:   Reason for Stopping:             Allergies   Allergies   Allergen  Reactions     Blood Transfusion Related (Informational Only) Other (See Comments)     Patient has a history of a clinically significant antibody against RBC antigens.  A delay in compatible RBCs may occur.

## 2023-05-23 NOTE — PROGRESS NOTES
Care Management Discharge Note    Discharge Date: 05/23/2023     Discharge Disposition: Home, Home Infusion    Discharge Services: Transportation Services (Pt calling to coordinate her own ride for this afternoon. Home Infusion (resumption), Outpatient Dialysis (resumption)    Discharge DME: No new DME    Discharge Transportation: health plan transportation ([employer] [phone])    Education Provided on the Discharge Plan: Yes  Persons Notified of Discharge Plans: Pt, RN, Home infusion liason  Patient/Family in Agreement with the Plan: Yes    Additional Information:  Per MD, pt discharging home today. This writer placed resumption orders for tubes feeds (no change to formula- pt has more at home) and updated home infusion liason. Pt plans to unhook for the ride and re-hook herself up when she gets home.    [facility] (Resumption of home tube feeds)  Phone: [phone]    [facility] (T/Th/Sat)  Phone: [phone]  Fax: [phone]  Updated nurse Ksenia on plan for discharge today. Discharge summary faxed.     [facility] (Home O2)    [employer]   Phone: [phone]    CC will continue to monitor patient's medical condition and progress towards discharge.  Sana Castillo RN BSN  6C Unit Care Coordinator  Phone number: [phone]  Pager: [phone]

## 2023-05-23 NOTE — PROGRESS NOTES
HEMODIALYSIS TREATMENT NOTE    Date: 5/23/2023  Time: 12:35 PM    Data:  Pre Wt:  57.3 kg   Desired Wt: 55.3 kg   Post Wt: 55.3 kg (estimated)   Weight change:  -2.0 kg  Ultrafiltration - Post Run Net Total Removed (mL): 2000 mL  Vascular Access Status: patent  Dialyzer Rinse: Light  Total Blood Volume Processed: 67 L Liters  Total Dialysis (Treatment) Time: 3 Hours    Lab:   No    Interventions & Assessment:  Received pt from [facility] via bed.  A/O x4, denies pain, denies SOB, denies N/V/D.  iHD started via right CVC, dressing C/D/I. Dialysate K2 / Ca2.5 bath,  / .  Pt was hemodynamically stable on HD. Pt was seen and examined by RABIA Mcadams. Pt tolerated Tx run. Tx uneventful  Blood rinse back done, both limbs saline locked and caps changed.  Report given to JAYE Holland  See MAR, flow sheet and MD orders for further details.      Plan:    Per Renal team

## 2023-05-25 ENCOUNTER — TELEPHONE (OUTPATIENT)
Dept: TRANSPLANT | Facility: CLINIC | Age: 63
End: 2023-05-25
Payer: MEDICARE

## 2023-05-25 DIAGNOSIS — Z94.2 S/P LUNG TRANSPLANT (H): Primary | ICD-10-CM

## 2023-05-25 LAB — TTG IGA SER-ACNC: 0.3 U/ML

## 2023-05-25 NOTE — TELEPHONE ENCOUNTER
The patient was admitted on 5/17/23 for intolerance of tube feedings with weight loss, neutropenia, and failure to thrive.  GI consulted, workup unrevealing outside of suspicion for osmolality contribution to symptoms.  Tolerating continuous tube feedings at reduced rate, currently 30-40 ml/hr, with some PO intake.  Discharging to home today.     Discharge recommendations:  - Pulmonary follow up tentatively rescheduled for 6/7  - Labs for OP f/u: CBC, BMP, mag, phos, tacro, ImmuKnow Assay, and DSA  - Imuran remains on hold for leukopenia   - Tissue transglutaminase Ab IgA still pending from 5/18, follow as OP  - Imodium to titrate to 2-4 stools daily  - Repeat fecal elastase ordered (please collect prior to discharge) and should discontinue enzymes if normal  - Limit suspension medications as able with concern for osmotic diarrhea  - Pt. to titrate TF up by 5 ml/hr increments as OP as able  - Blood cultures still pending from 5/17, follow as OP       Called patient to check in a review discharge instructions.   Pt reports feeling pretty good. TF still at 30ml/hr, continuously. Will try increasing tonight to 35ml/hr.   Had formed stool yesterday, using Imodium two times daily.   Confirmed tacrolimus dose should be 2mg BID. Labs next week Wednesday including fecal elastase.

## 2023-05-25 NOTE — LETTER
PHYSICIAN ORDERS      DATE & TIME ISSUED: May 25, 2023 8:46 AM  PATIENT NAME: Sofie Rodriguez   : 1960     [facility] MR# [if applicable]: [id number]     DIAGNOSIS:  Lung Transplant  Z94.2  Please draw tacrolimus level, bmp, mag, and cbc with differential week of 23.     Please collect elastase fecal week of 23      Any questions please call: Girma [phone]    Please fax these results to [phone].        Kaylin Triana PA-C

## 2023-05-31 ENCOUNTER — EXTERNAL ORDER RESULTS (OUTPATIENT)
Dept: LAB | Facility: CLINIC | Age: 63
End: 2023-05-31

## 2023-05-31 ENCOUNTER — LAB (OUTPATIENT)
Dept: LAB | Facility: CLINIC | Age: 63
End: 2023-05-31
Payer: MEDICARE

## 2023-05-31 ENCOUNTER — APPOINTMENT (OUTPATIENT)
Dept: LAB | Facility: CLINIC | Age: 63
End: 2023-05-31
Payer: MEDICARE

## 2023-05-31 DIAGNOSIS — Z94.2 S/P LUNG TRANSPLANT (H): ICD-10-CM

## 2023-05-31 DIAGNOSIS — Z94.2 LUNG REPLACED BY TRANSPLANT (H): ICD-10-CM

## 2023-05-31 PROCEDURE — 80197 ASSAY OF TACROLIMUS: CPT

## 2023-05-31 ASSESSMENT — ENCOUNTER SYMPTOMS
RECTAL PAIN: 0
NAUSEA: 0
BLOOD IN STOOL: 0
VOMITING: 0
HEARTBURN: 0
DIARRHEA: 1
BLOATING: 0
JAUNDICE: 0
BOWEL INCONTINENCE: 0
ABDOMINAL PAIN: 0
CONSTIPATION: 0

## 2023-06-01 ENCOUNTER — TELEPHONE (OUTPATIENT)
Dept: TRANSPLANT | Facility: CLINIC | Age: 63
End: 2023-06-01
Payer: MEDICARE

## 2023-06-01 DIAGNOSIS — Z94.2 S/P LUNG TRANSPLANT (H): ICD-10-CM

## 2023-06-01 LAB
% BASOPHILS (EXTERNAL): 4 % (ref 0–2)
% EOSINOPHILS (EXTERNAL): 3 % (ref 0–7)
% LYMPHOCYTES (EXTERNAL): 27.3 % (ref 16–49)
% MONOCYTES (EXTERNAL): 3 % (ref 0–10)
% NEUTROPHILS (EXTERNAL): 61.6 % (ref 43–80)
ABSOLUTE BASOPHILS (EXTERNAL): 0.1 10(3)/UL (ref 0–0.2)
ABSOLUTE EOSINOPHILS (EXTERNAL): 0.1 10(3)/UL (ref 0–0.8)
ABSOLUTE LYMPHOCYTES (EXTERNAL): 1 10(3)/UL (ref 1.2–3.9)
ABSOLUTE MONOCYTES (EXTERNAL): 0.1 10(3)/UL (ref 0–1.1)
ABSOLUTE NEUTROPHILS (EXTERNAL): 2.2 10(3)/UL (ref 1.8–9.2)
ANION GAP SERPL CALC-SCNC: 13.1 MMOL/L (ref 10–20)
BUN SERPL-MCNC: 28.9 MG/DL (ref 10–20)
BUN/CREATININE RATIO (EXTERNAL): 6.98 RATIO (ref 11.7–22.9)
CALCIUM (EXTERNAL): 9.2 MG/DL (ref 8.6–10.5)
CHLORIDE (EXTERNAL): 104 MMOL/L (ref 98–107)
CO2 (EXTERNAL): 26 MMOL/L (ref 22–29)
CREATININE (EXTERNAL): 4.14 MG/DL (ref 0.57–1.11)
ERYTHROCYTE [DISTWIDTH] IN BLOOD BY AUTOMATED COUNT: 15.7 % (ref 10–16.8)
GFR ESTIMATED (EXTERNAL): 12 ML/MIN/1.73M2
GLUCOSE (EXTERNAL): 91 MG/DL (ref 70–100)
HEMATOCRIT (EXTERNAL): 29.6 % (ref 33.9–47.5)
HEMOGLOBIN (EXTERNAL): 9.8 G/DL (ref 11.2–15.8)
Lab: 0 10(3)/UL
Lab: 1 %
MAGNESIUM (EXTERNAL): 2 MG/DL (ref 1.8–2.6)
MCH RBC QN AUTO: 38.2 PG (ref 26.4–32.9)
MCHC RBC AUTO-ENTMCNC: 33.1 G/DL (ref 32–36.09)
MCV RBC AUTO: 115.4 FL (ref 80.6–98.5)
METHOD (EXTERNAL): ABNORMAL
PLATELET COUNT (EXTERNAL): 222 10(3)/UL (ref 179–450)
POTASSIUM (EXTERNAL): 4.1 MMOL/L (ref 3.5–5.1)
RBC # BLD AUTO: 2.56 10(6)/UL (ref 3.76–5.39)
SODIUM (EXTERNAL): 139 MMOL/L (ref 136–146)
TACROLIMUS BLD-MCNC: 3.9 UG/L (ref 5–15)
TME LAST DOSE: ABNORMAL H
TME LAST DOSE: ABNORMAL H
WBC COUNT (EXTERNAL): 3.5 10(3)/UL (ref 3.7–12.1)

## 2023-06-01 NOTE — TELEPHONE ENCOUNTER
Tacrolimus level 3.9 at 12 hours, on 5/31/23.  Goal 8-12.   Current dose 2 mg in AM, 2 mg in PM    Dose changed to 3 mg in AM, 2.5 mg in PM. Pt instructed to also take 1mg additional now.   Recheck level Monday    Discussed with patient    MyChart message sent

## 2023-06-02 NOTE — LETTER
PHYSICIAN ORDERS      DATE & TIME ISSUED: 2023 8:47 AM  PATIENT NAME: Sofie Rodriguez   : 1960     [facility] MR# [if applicable]: [id number]     DIAGNOSIS:  Lung Transplant  Z94.2    Please draw tacrolimus level on 2023    Any questions please call: Girma [phone]    Please fax these results to [phone].        Kaylin Triana PA-C

## 2023-06-05 ENCOUNTER — LAB (OUTPATIENT)
Dept: LAB | Facility: CLINIC | Age: 63
End: 2023-06-05
Payer: MEDICARE

## 2023-06-05 DIAGNOSIS — Z94.2 LUNG REPLACED BY TRANSPLANT (H): ICD-10-CM

## 2023-06-05 LAB — SCAN LAB RESULTS (EXTERNAL): NORMAL

## 2023-06-05 PROCEDURE — 80197 ASSAY OF TACROLIMUS: CPT | Performed by: INTERNAL MEDICINE

## 2023-06-06 DIAGNOSIS — Z00.6 RESEARCH STUDY PATIENT: Primary | ICD-10-CM

## 2023-06-06 DIAGNOSIS — Z94.2 S/P LUNG TRANSPLANT (H): ICD-10-CM

## 2023-06-06 LAB
TACROLIMUS BLD-MCNC: 6.5 UG/L (ref 5–15)
TME LAST DOSE: NORMAL H
TME LAST DOSE: NORMAL H

## 2023-06-06 NOTE — PROGRESS NOTES
Transplant Coordinator Note    Reason for visit: Post lung transplant follow up visit   Coordinator: Present   Caregiver:  none    Health concerns addressed today:  1. Respiratory: stable. No cough or shortness of breath. CXR stable.  2. GI: recent weight loss. TF's at 30 ml, 24 hours. No nausea. Some diarrhea if TF's are increased.   3. BP elevated 150/73    Activity/rehab: Up ad magalie, pulm rehab twice per week  Oxygen needs: Only using oxygen at night, 2L  Pain management/RX: tylenol and oxycodone as needed, incisional pain from time to time.   Diabetic management: managed by PCP; checking twice per day  Next Bronch due: after next appt in July  Risk Criteria Labs: negative 7/28/22  CMV status: D+/R+  Valcyte stopped: POD 8-90  EBV status: D+/R+  AC/asa:  ASA discontinued after recent hospitalization   PJP prophylactic: Dapsone   Diet: Ad magalie. TF at 30 ml. 24 hours/day.     COVID:  1. COVID-19 infection (yes/no, date of most recent positive test): no  2. Status/instructions given about COVID-19 vaccine: yes    Pt Education: medications (use/dose/side effects), how/when to call coordinator, frequency of labs, s/s of infection/rejection, call prior to starting any new medications, lab/vital sign book    Health Maintenance:     Last colonoscopy:     Next colonoscopy due:     Dermatology: done in May    Vaccinations this visit: none    Labs, CXR, PFTs reviewed with patient  Medication record reviewed and reconciled  Questions and concerns addressed    Patient Instructions  1. Continue to hydrate with 60-70 oz fluids daily.  2. Continue to exercise daily or most days of the week.  3. Follow up with your primary care provider for annual gender health maintenance procedures.  4. Follow up with colonoscopy schedule.  5. Follow up with annual dermatology visits.  6. It doesn't seem like the COVID vaccine is working well in lung transplant patients. A number of lung transplant patients have gotten sick with COVID even after  receiving the vaccines. Based on our recent experience, it can be life-threatening to get COVID  even after being vaccinated. Please continue to act like you did not get the COVID vaccine - social distancing, wearing a mask, good hand hygiene, etc. If the people around you are vaccinated, it will help reduce the risk of you getting COVID. All members of your household should be vaccinated.  7. We will have the dietician reach out to you.  8. Plan to continue pulmonary rehab  9. Please recheck labs next week. If WBC > 4, we will restart your azathioprine  10. We will see you in about a month for annual studies and DEXA scan (bone density) and do your last bronchoscopy the day after. Please fast for these labs.  11. We recommend getting the bivalent covid vaccine. You can do this at your local pharmacy.  12. Please follow up with Nephrology (kidney team)    Next transplant clinic appointment:  1 month with CXR, labs full PFTs and DEXA  Next lab draw: pending tacrolimus    AVS printed at time of check out

## 2023-06-06 NOTE — PROGRESS NOTES
Tacrolimus level 6.5 at 12 hours, on 6/5/23.  Goal 8-12.   Current dose 3 mg in AM, 2.5 mg in PM    Dose changed to 3 mg in AM, 3.5 mg in PM   Recheck level in 7-10 days.     [place] message sent

## 2023-06-07 ENCOUNTER — OFFICE VISIT (OUTPATIENT)
Dept: PULMONOLOGY | Facility: CLINIC | Age: 63
End: 2023-06-07
Attending: PHYSICIAN ASSISTANT
Payer: MEDICARE

## 2023-06-07 ENCOUNTER — LAB (OUTPATIENT)
Dept: LAB | Facility: CLINIC | Age: 63
End: 2023-06-07
Payer: MEDICARE

## 2023-06-07 ENCOUNTER — ANCILLARY PROCEDURE (OUTPATIENT)
Dept: GENERAL RADIOLOGY | Facility: CLINIC | Age: 63
End: 2023-06-07
Attending: INTERNAL MEDICINE
Payer: MEDICARE

## 2023-06-07 VITALS — HEART RATE: 78 BPM | OXYGEN SATURATION: 95 % | SYSTOLIC BLOOD PRESSURE: 150 MMHG | DIASTOLIC BLOOD PRESSURE: 73 MMHG

## 2023-06-07 DIAGNOSIS — Z94.2 S/P LUNG TRANSPLANT (H): ICD-10-CM

## 2023-06-07 DIAGNOSIS — Z94.2 S/P LUNG TRANSPLANT (H): Primary | ICD-10-CM

## 2023-06-07 DIAGNOSIS — Z94.2 S/P LUNG TRANSPLANT (H): Chronic | ICD-10-CM

## 2023-06-07 DIAGNOSIS — Z79.52 IMMUNOSUPPRESSION DUE TO CHRONIC STEROID USE (H): ICD-10-CM

## 2023-06-07 DIAGNOSIS — D70.9 NEUTROPENIA, UNSPECIFIED TYPE (H): ICD-10-CM

## 2023-06-07 DIAGNOSIS — D84.821 IMMUNOSUPPRESSION DUE TO CHRONIC STEROID USE (H): ICD-10-CM

## 2023-06-07 DIAGNOSIS — D80.1 HYPOGAMMAGLOBULINEMIA (H): ICD-10-CM

## 2023-06-07 DIAGNOSIS — Z94.2 LUNG REPLACED BY TRANSPLANT (H): ICD-10-CM

## 2023-06-07 DIAGNOSIS — Z00.6 RESEARCH STUDY PATIENT: ICD-10-CM

## 2023-06-07 DIAGNOSIS — T38.0X5A IMMUNOSUPPRESSION DUE TO CHRONIC STEROID USE (H): ICD-10-CM

## 2023-06-07 DIAGNOSIS — R79.9 ABNORMAL FINDING OF BLOOD CHEMISTRY, UNSPECIFIED: ICD-10-CM

## 2023-06-07 LAB
ANION GAP SERPL CALCULATED.3IONS-SCNC: 12 MMOL/L (ref 7–15)
BUN SERPL-MCNC: 19.8 MG/DL (ref 8–23)
CALCIUM SERPL-MCNC: 9.5 MG/DL (ref 8.8–10.2)
CHLORIDE SERPL-SCNC: 104 MMOL/L (ref 98–107)
CREAT SERPL-MCNC: 4.6 MG/DL (ref 0.51–0.95)
DEPRECATED HCO3 PLAS-SCNC: 28 MMOL/L (ref 22–29)
ERYTHROCYTE [DISTWIDTH] IN BLOOD BY AUTOMATED COUNT: 16.3 % (ref 10–15)
EXPTIME-PRE: 5.02 SEC
FEF2575-%PRED-PRE: 136 %
FEF2575-PRE: 2.83 L/SEC
FEF2575-PRED: 2.07 L/SEC
FEFMAX-%PRED-PRE: 77 %
FEFMAX-PRE: 4.71 L/SEC
FEFMAX-PRED: 6.09 L/SEC
FEV1-%PRED-PRE: 65 %
FEV1-PRE: 1.46 L
FEV1FEV6-PRE: 95 %
FEV1FEV6-PRED: 81 %
FEV1FVC-PRE: 97 %
FEV1FVC-PRED: 80 %
FIFMAX-PRE: 3.58 L/SEC
FVC-%PRED-PRE: 54 %
FVC-PRE: 1.51 L
FVC-PRED: 2.78 L
GFR SERPL CREATININE-BSD FRML MDRD: 10 ML/MIN/1.73M2
GLUCOSE SERPL-MCNC: 114 MG/DL (ref 70–99)
HCT VFR BLD AUTO: 36.2 % (ref 35–47)
HGB BLD-MCNC: 11.1 G/DL (ref 11.7–15.7)
MAGNESIUM SERPL-MCNC: 2.1 MG/DL (ref 1.7–2.3)
MCH RBC QN AUTO: 38 PG (ref 26.5–33)
MCHC RBC AUTO-ENTMCNC: 30.7 G/DL (ref 31.5–36.5)
MCV RBC AUTO: 124 FL (ref 78–100)
PLATELET # BLD AUTO: 213 10E3/UL (ref 150–450)
POTASSIUM SERPL-SCNC: 4.3 MMOL/L (ref 3.4–5.3)
RBC # BLD AUTO: 2.92 10E6/UL (ref 3.8–5.2)
SODIUM SERPL-SCNC: 144 MMOL/L (ref 136–145)
WBC # BLD AUTO: 4.8 10E3/UL (ref 4–11)

## 2023-06-07 PROCEDURE — 83735 ASSAY OF MAGNESIUM: CPT | Performed by: INTERNAL MEDICINE

## 2023-06-07 PROCEDURE — 94375 RESPIRATORY FLOW VOLUME LOOP: CPT | Performed by: PHYSICIAN ASSISTANT

## 2023-06-07 PROCEDURE — 86833 HLA CLASS II HIGH DEFIN QUAL: CPT | Performed by: INTERNAL MEDICINE

## 2023-06-07 PROCEDURE — 86332 IMMUNE COMPLEX ASSAY: CPT | Mod: 90 | Performed by: PATHOLOGY

## 2023-06-07 PROCEDURE — 36415 COLL VENOUS BLD VENIPUNCTURE: CPT | Performed by: PATHOLOGY

## 2023-06-07 PROCEDURE — 80048 BASIC METABOLIC PNL TOTAL CA: CPT | Performed by: INTERNAL MEDICINE

## 2023-06-07 PROCEDURE — 99215 OFFICE O/P EST HI 40 MIN: CPT | Mod: 25 | Performed by: PHYSICIAN ASSISTANT

## 2023-06-07 PROCEDURE — 71046 X-RAY EXAM CHEST 2 VIEWS: CPT | Performed by: RADIOLOGY

## 2023-06-07 PROCEDURE — 86352 CELL FUNCTION ASSAY W/STIM: CPT | Performed by: PHYSICIAN ASSISTANT

## 2023-06-07 PROCEDURE — 87799 DETECT AGENT NOS DNA QUANT: CPT | Performed by: INTERNAL MEDICINE

## 2023-06-07 PROCEDURE — G0463 HOSPITAL OUTPT CLINIC VISIT: HCPCS | Performed by: PHYSICIAN ASSISTANT

## 2023-06-07 PROCEDURE — 86832 HLA CLASS I HIGH DEFIN QUAL: CPT | Performed by: INTERNAL MEDICINE

## 2023-06-07 PROCEDURE — 85027 COMPLETE CBC AUTOMATED: CPT | Performed by: PATHOLOGY

## 2023-06-07 PROCEDURE — 99000 SPECIMEN HANDLING OFFICE-LAB: CPT | Performed by: PATHOLOGY

## 2023-06-07 ASSESSMENT — PAIN SCALES - GENERAL: PAINLEVEL: NO PAIN (0)

## 2023-06-07 NOTE — NURSING NOTE
Chief Complaint   Patient presents with     Lung Transplant     TXP Follow up      Vitals were taken and medications were reconciled.     Jeanne Ordonez RMA  11:45 AM

## 2023-06-07 NOTE — PROGRESS NOTES
[facility]  June 7, 2023         Assessment and Plan:   Sofie Rodriguez is a 63 year old female with h/o COPD s/p BSLT 6/28/22 complicated by post-operative hemidiaphragm palsy, chronic hypercapnia, persistent pleural effusions, recurrent PNAs, positive DSA, EBV viremia, hypogammaglobulinemia, gastroparesis s/p G/J tube placement 7/27/22, pyloric botox 1/25/23, GI bleed 2/2 pyloric ulcer, hemobilia s/p ERCP and MRCP, chronic diarrhea, recurrent C diff colitis, and ESRD on HD T/Th/Sat.  PMH also notable for HFpEF, ASCVD, NTM colonization, hep C, and methamphetamine use. Admitted 5/17-5/23 for intolerance of tube feedings with weight loss, neutropenia, and failure to thrive. This is her first post discharge follow up.     1. S/p bilateral sequential lung transplant:   Right hemidiaphragm palsy:   Nocturnal hypoxia: no new pulmonary complaints, notes her pulm rehab is going to end soon and she does feel like she would benefit from extended therapy. On RA during the day, using 2L O2 at night. Sating 95% on room air. CMV 5/12 negative. ImmuKnow assay 73 on 5/18, indicative of increased risk of infection (checked in setting of 2-drug IST with [facility] and recurrent C diff). CXR reviewed today and demonstrates stable changes of transplant. PFTs are low, but relatively stable.  - Repeat ImmuKnow assay pending  - Extend pulmonary rehab  - 2L NC overnight--has sleep study the end of the month  - Continue IS including tacrolimus (goal 8-12) and prednisone  - AZA on hold; if WBC > 4 on next check, will restart azathioprine  - Dapsone qMWF for PJP ppx     2. Positive DSA: no prior treatment for [facility] but has been a concern since pt. initially demonstrated rising DSA post-transplant. 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 Most recently 9100 on 5/18. Also with h/o DR52 Ab (11/2-11/23/22, mfi 846-1072) and CW10 (10/19-12/9/22, mfi  527-1600).    - DSA pending     3. Hypogammaglobulinemia: h/o prior IVIG infusions but most recent IgG adequate at 959 on 5/18.      4. EBV viremia: negative on recheck 5/12.   - EBV pending     5. Severe malnutrition of chronic illness:  Gastroparesis s/p PEG/J and botox:   Pyloric ulcer:  Acute on chronic diarrhea:  Recurrent C diff colitis: chronic diarrhea since transplant with recurrent episodes of C diff, most recently diagnosed on 5/9 and started on PO vancomycin. Stool urgency and frequency persisting with tube feedings but decreased by about half when off cycled feeds. No improvement after formula change as OP on 4/18. Very little PO intake otherwise, reporting similar symptoms in addition to early satiety.  GI consulted 5/18, see their notes for details, consistent with osmotic diarrhea. PO vancomycin stopped given no improvement with medication/completed treatment (?colonization).  Calprotectin feces elevated at 210 on 5/18. Osmolality stool 328 on 5/19.  Transitioned to continuous TF, able to tolerate 30 ml/hr although noted nausea and abdominal cramping with increase to 40 ml/hr. Currently, doing between 20-30 ml/hour continuous (goal is 40 ml /hr), unless she is at HD or has to go somewhere. Notes improved po intake, stools are mushy to liquid, but not worse.  - Imodium 2 mg up to 4 times/day PRN  - Continue TF and diet; will have SARAH Cote follow up  - Scheduled to see Dr. Navarro later this summer to discuss possible G-POEM     6. Leukopenia: Likely 2/2 medications but persisting despite holding Imuran on 5/8. WBC > 4.8 today.  - Recheck labs next week; if > WBC X 2, will resume azathioprine    7. CKD:   HTN: /73, around 11-130s at home. Doing dialysis T/Th/Sat. Labs pending today.     RTC: 4 weeks with annuals and yearly bronch  Vaccinations: recommend repeat bivalent covid vaccine  Annual dermatology visit: following Derm   Preventative: DEXA > June 2023; colonoscopy completed March 2023  "(diverticulosis in the sigmoid colon and colonic polyp noted s/p polypectomy)--needs repeat in 2026 per notes, mammogram completed this past winter and was negative    Excluding time spent reading PFTs, I have spent > 55 minutes on the day of encounter reviewing the patient's chart including OSH notes, discussing vitals, labs, imaging and medications with the patient.     Kaylin Triana PA-C  Pulmonary, Allergy, Critical Care and Sleep Medicine        Interval History:     Overall, feeling better. No fever or chills, no allergy complaints. No cough, no new or unexpected shortness of breath, no congestion or tightness. No pain in her chest, but can feel the \"ribs\" around her breast, doesn't hurt but feels weird. No nausea unless she tries to advance her TF, then starts with diarrhea, then progresses to nausea. No vomiting, but dry heaves. Stools are mushy (wtihout the feeds on), otherwise pretty runny. Quit fiber when in the hospital, but about the same with fiber. Using imodium twice/day. Eating some food, larger meal last night          Review of Systems:   Please see HPI, otherwise the complete 10 point ROS is negative.           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     Hepatitis 2017    Hep C, [facility]     HTN (hypertension)      Lung infection 11/30/2022     Osteopenia      Past Surgical History:   Procedure Laterality Date     BRONCHOSCOPY (RIGID OR FLEXIBLE), DIAGNOSTIC N/A 8/2/2022    Procedure: BRONCHOSCOPY, DIAGNOSTIC- inspection Bronch;  Surgeon: Kamala Lovell MD;  Location: [facility]     BRONCHOSCOPY (RIGID OR FLEXIBLE), DIAGNOSTIC N/A 9/13/2022    Procedure: INSPECTION BRONCHOSCOPY, WITH BRONCHOALVEOLAR LAVAGE;  Surgeon: Jose R Mccullough MD;  Location: [facility]     BRONCHOSCOPY (RIGID OR " FLEXIBLE), DIAGNOSTIC N/A 11/9/2022    Procedure: BRONCHOSCOPY, WITH BRONCHOALVEOLAR LAVAGE AND BIOPSY;  Surgeon: Cesar Lima MD;  Location:  GI     BRONCHOSCOPY (RIGID OR FLEXIBLE), DIAGNOSTIC N/A 1/25/2023    Procedure: BRONCHOSCOPY, WITH BRONCHOALVEOLAR LAVAGE AND BIOPSY;  Surgeon: Mason Reddy MD;  Location:  GI     BRONCHOSCOPY (RIGID OR FLEXIBLE), DIAGNOSTIC N/A 4/19/2023    Procedure: BRONCHOSCOPY, WITH BRONCHOALVEOLAR LAVAGE AND BIOPSY;  Surgeon: Kamala Lovell MD;  Location:  GI     BRONCHOSCOPY FLEXIBLE AND RIGID N/A 07/19/2022    Procedure: BRONCHOSCOPY inspection only;  Surgeon: Bob Liao MD;  Location:  GI     COLONOSCOPY  2015     CV CORONARY ANGIOGRAM N/A 06/30/2021    Procedure: CV CORONARY ANGIOGRAM;  Surgeon: Alexander Cuellar MD;  Location:  HEART CARDIAC CATH LAB     CV RIGHT HEART CATH MEASUREMENTS RECORDED N/A 06/30/2021    Procedure: CV RIGHT HEART CATH;  Surgeon: Alexander Cuellar MD;  Location:  HEART CARDIAC CATH LAB     ENDOSCOPIC RETROGRADE CHOLANGIOPANCREATOGRAM N/A 8/11/2022    Procedure: ENDOSCOPIC RETROGRADE CHOLANGIOPANCREATOGRAPHY WITH PANCREATIC DUCT NEEDLE KNIFE AND STENT PLACEMENT, BILE DUCT SPHINCTEROTOMY, BLOOD/DEBRIS REMOVAL AND STENT PLACEMENT;  Surgeon: Cosmo Arroyo MD;  Location:  OR     ENDOSCOPIC RETROGRADE CHOLANGIOPANCREATOGRAM N/A 10/7/2022    Procedure: ENDOSCOPIC RETROGRADE CHOLANGIOPANCREATOGRAPHY with biliary and pancreatic stent removal, debris removal;  Surgeon: Cosmo Arroyo MD;  Location:  OR     ENT SURGERY  1974    tonsillectomy     ENTEROSCOPY SMALL BOWEL N/A 8/11/2022    Procedure: SMALL BOWEL ENTEROSCOPY;  Surgeon: Cosmo Arroyo MD;  Location:  OR     ESOPHAGOGASTRODUODENOSCOPY, WITH NASOGASTRIC TUBE INSERTION N/A 07/01/2022    Procedure: ESOPHAGOGASTRODUODENOSCOPY, WITH NASOJEJUNAL TUBE INSERTION;  Surgeon: Ozzy Nickerson MD;  Location:  GI     ESOPHAGOSCOPY, GASTROSCOPY,  DUODENOSCOPY (EGD), COMBINED N/A 8/3/2022    Procedure: ESOPHAGOGASTRODUODENOSCOPY (EGD);  Surgeon: Ira Andres MD;  Location:  GI     ESOPHAGOSCOPY, GASTROSCOPY, DUODENOSCOPY (EGD), COMBINED N/A 1/25/2023    Procedure: ESOPHAGOGASTRODUODENOSCOPY (EGD) with botox injection;  Surgeon: Gonzalez Navarro MD;  [facility]     HAND SURGERY       INSERT CHEST TUBE Right 9/13/2022    Procedure: Insert chest tube;  Surgeon: Jose R Mccullough MD;  [facility]     IR CVC TUNNEL PLACEMENT > 5 YRS OF AGE  9/26/2022     IR GASTRO JEJUNOSTOMY TUBE CHANGE  8/31/2022     IR GASTRO JEJUNOSTOMY TUBE CHANGE  12/21/2022     IR GASTRO JEJUNOSTOMY TUBE PLACEMENT  7/27/2022     IR THORACENTESIS  8/29/2022     LEEP TX, CERVICAL  04/07/2017    HECTOR III     LYMPH NODE BIOPSY Left 2005    Left axilla, benign- [place]     MIDLINE INSERTION - DOUBLE LUMEN Left 07/28/2022    20cm, Basilic vein     REPLACE GASTROJEJUNOSTOMY TUBE, PERCUTANEOUS  10/7/2022    Procedure: Replace Gastrojejunostomy Tube;  Surgeon: Cosmo Arroyo MD;  Location: [facility]     THORACENTESIS Left 8/29/2022    Procedure: THORACENTESIS;  Surgeon: Bo Capone PA-C;  Location: [facility]     THORACENTESIS Left 9/13/2022    Procedure: Thoracentesis;  Surgeon: Jose R Mccullough MD;  Location: [facility]     THROMBECTOMY UPPER EXTREMITY Left 07/02/2022    Procedure: LEFT RADIAL ARM THROMBECTOMY;  Surgeon: Christie Graham MD;  Location:  OR     TRANSPLANT LUNG RECIPIENT SINGLE X2 Bilateral 06/28/2022    Procedure: Clamshell Incision, Bilateral Sequential Lung Transplant, On Cardiopulmonary Bypass, Flexible Bronchoscopy;  Surgeon: Sue Sunshine MD;  Location:  OR           Family History:     No family history on file.         Social History:     Social History     Socioeconomic History     Marital status: Single     Spouse name: Not on file     Number of children: Not on file     Years of education: Not on file     Highest education  level: Not on file   Occupational History     Not on file   Tobacco Use     Smoking status: Former     Years: 30.00     Types: Cigarettes     Quit date: 2020     Years since quittin.5     Smokeless tobacco: Never   Vaping Use     Vaping status: Not on file   Substance and Sexual Activity     Alcohol use: Not Currently     Drug use: Not Currently     Types: Marijuana, Methamphetamines     Comment: hx:marijuana and methamphetamine-quit both unsure ?  2-3 yrs ago     Sexual activity: Not on file   Other Topics Concern     Parent/sibling w/ CABG, MI or angioplasty before 65F 55M? Not Asked   Social History Narrative     Not on file     Social Determinants of Health     Financial Resource Strain: Not on file   Food Insecurity: Not on file   Transportation Needs: Not on file   Physical Activity: Not on file   Stress: Not on file   Social Connections: Not on file   Intimate Partner Violence: Not on file   Housing Stability: Not on file            Medications:     Current Outpatient Medications   Medication     alcohol swab prep pads     azaTHIOprine (IMURAN) 5 mg/mL SUSP     blood glucose (CONTOUR NEXT TEST) test strip     blood glucose (NO BRAND SPECIFIED) lancets standard     blood glucose monitoring (CONTOUR NEXT MONITOR W/DEVICE KIT) meter device kit     calcium carbonate 600 mg-vitamin D 400 units (CALTRATE) 600-400 MG-UNIT per tablet     cyanocobalamin (CYANOCOBALAMIN) 500 MCG tablet     dapsone 2 mg/mL SUSP     insulin pen needle (32G X 4 MM) 32G X 4 MM miscellaneous     loperamide (IMODIUM A-D) 2 MG tablet     metoprolol tartrate (LOPRESSOR) 50 MG tablet     Nutritional Supplements (GLUCOSE MANAGEMENT) TABS     pantoprazole (PROTONIX) 2 mg/mL SUSP suspension     predniSONE (DELTASONE) 5 MG tablet     protein modular (PROSOURCE TF) LIQD     Sharps Container MISC     tacrolimus (GENERIC EQUIVALENT) 1 mg/mL suspension     No current facility-administered medications for this visit.            Physical Exam:    BP (!) 150/73 (BP Location: Right arm, Patient Position: Sitting, Cuff Size: Adult Small)   Pulse 78   SpO2 95%     GENERAL: alert, NAD  HEENT: NCAT, EOMI, no scleral icterus, oral mucosa moist and without lesions  Neck: no cervical or supraclavicular adenopathy  Lungs: good air flow, no crackles, rhonchi or wheezing  CV: RRR, S1S2, no murmurs noted  Abdomen: normoactive BS, soft, non tender  Lymph: no edema  Neuro: AAO X 3, CN 2-12 grossly intact  Psychiatric: normal affect, good eye contact  Skin: no rash, jaundice or lesions on limited exam         Data:   All laboratory and imaging data reviewed.      Recent Results (from the past 168 hour(s))   Tacrolimus by [employer] Mass Spectrometry    Collection Time: 06/05/23  8:16 AM   Result Value Ref Range    Tacrolimus by Tandem Mass Spectrometry 6.5 5.0 - 15.0 ug/L    Tacrolimus Last Dose Date 6/4/2023     Tacrolimus Last Dose Time  8:00 PM    CBC with platelets    Collection Time: 06/07/23 11:03 AM   Result Value Ref Range    WBC Count 4.8 4.0 - 11.0 10e3/uL    RBC Count 2.92 (L) 3.80 - 5.20 10e6/uL    Hemoglobin 11.1 (L) 11.7 - 15.7 g/dL    Hematocrit 36.2 35.0 - 47.0 %     (H) 78 - 100 fL    MCH 38.0 (H) 26.5 - 33.0 pg    MCHC 30.7 (L) 31.5 - 36.5 g/dL    RDW 16.3 (H) 10.0 - 15.0 %    Platelet Count 213 150 - 450 10e3/uL   General PFT Lab (Please always keep checked)    Collection Time: 06/07/23 11:13 AM   Result Value Ref Range    FVC-Pred 2.78 L    FVC-Pre 1.51 L    FVC-%Pred-Pre 54 %    FEV1-Pre 1.46 L    FEV1-%Pred-Pre 65 %    FEV1FVC-Pred 80 %    FEV1FVC-Pre 97 %    FEFMax-Pred 6.09 L/sec    FEFMax-Pre 4.71 L/sec    FEFMax-%Pred-Pre 77 %    FEF2575-Pred 2.07 L/sec    FEF2575-Pre 2.83 L/sec    DCN4587-%Pred-Pre 136 %    ExpTime-Pre 5.02 sec    FIFMax-Pre 3.58 L/sec    FEV1FEV6-Pred 81 %    FEV1FEV6-Pre 95 %     PFT interpretation:  Maneuver: valid and meets ATS guidelines  Mild obstruction based on Z socre  Compared to prior: FEV1 of 1.46 is 50 ml below  prior  The decrease in FVC may represent air trapping v. restrictive physiology.  Lung volumes would be necessary to determine.

## 2023-06-07 NOTE — PATIENT INSTRUCTIONS
Patient Instructions  1. Continue to hydrate with 60-70 oz fluids daily.  2. Continue to exercise daily or most days of the week.  3. Follow up with your primary care provider for annual gender health maintenance procedures.  4. Follow up with colonoscopy schedule.  5. Follow up with annual dermatology visits.  6. It doesn't seem like the COVID vaccine is working well in lung transplant patients. A number of lung transplant patients have gotten sick with COVID even after receiving the vaccines. Based on our recent experience, it can be life-threatening to get COVID  even after being vaccinated. Please continue to act like you did not get the COVID vaccine - social distancing, wearing a mask, good hand hygiene, etc. If the people around you are vaccinated, it will help reduce the risk of you getting COVID. All members of your household should be vaccinated.  7. We will have the dietician reach out to you.  8. Plan to continue pulmonary rehab  9. Please recheck labs next week. If WBC > 4, we will restart your azathioprine  10. We will see you in about a month for annual studies and DEXA scan (bone density) and do your last bronchoscopy the day after  11. We recommend getting the bivalent covid vaccine. You can do this at your local pharmacy.  12. Please follow up with Nephrology (kidney team)    Next transplant clinic appointment:  1 month with CXR, labs, full PFTs and DEXA  Next lab draw: pending tacrolimus    ~~~~~~~~~~~~~~~~~~~~~~~~~    Thoracic Transplant Office phone [phone], fax [phone]    Office Hours 8:30 - 5:00     For after-hours urgent issues, please dial [phone] and ask the  to page the Thoracic Transplant Coordinator On-Call.   --------------------  To expedite your medication refill(s), please contact your pharmacy and have them fax a refill request to: [phone]    *Please allow 3 business days for routine medication refills.  *Please allow 5 business days for controlled  substance medication refills.    **For Diabetic medications and supplies refill(s), please contact your pharmacy and have them contact your Endocrine team.  --------------------  For scheduling appointments call [phone].  --------------------  Please Note: If you are active on [email or web address], all future test results will be sent by [email or web address] message only, and will no longer be called to patient. You may also receive communication directly from your physician.

## 2023-06-07 NOTE — LETTER
6/7/2023         RE: Sofie Rodriguez  [address] [place] [address]        Dear Colleague,    Thank you for referring your patient, Sofie Rodriguez, to the [facility] [place]. Please see a copy of my visit note below.    Transplant Coordinator Note    Reason for visit: Post lung transplant follow up visit   Coordinator: Present   Caregiver:  none    Health concerns addressed today:  1. Respiratory: stable. No cough or shortness of breath. CXR stable.  2. GI: recent weight loss. TF's at 30 ml, 24 hours. No nausea. Some diarrhea if TF's are increased.   3. BP elevated 150/73    Activity/rehab: Up ad magalie, pulm rehab twice per week  Oxygen needs: Only using oxygen at night, 2L  Pain management/RX: tylenol and oxycodone as needed, incisional pain from time to time.   Diabetic management: managed by PCP; checking twice per day  Next Bronch due: after next appt in July  Risk Criteria Labs: negative 7/28/22  CMV status: D+/R+  Valcyte stopped: POD 8-90  EBV status: D+/R+  AC/asa:  ASA discontinued after recent hospitalization   PJP prophylactic: Dapsone   Diet: Ad magalie. TF at 30 ml. 24 hours/day.     COVID:  1. COVID-19 infection (yes/no, date of most recent positive test): no  2. Status/instructions given about COVID-19 vaccine: yes    Pt Education: medications (use/dose/side effects), how/when to call coordinator, frequency of labs, s/s of infection/rejection, call prior to starting any new medications, lab/vital sign book    Health Maintenance:     Last colonoscopy:     Next colonoscopy due:     Dermatology: done in May    Vaccinations this visit: none    Labs, CXR, PFTs reviewed with patient  Medication record reviewed and reconciled  Questions and concerns addressed    Patient Instructions  1. Continue to hydrate with 60-70 oz fluids daily.  2. Continue to exercise daily or most days of the week.  3. Follow up with your primary care provider for annual  gender health maintenance procedures.  4. Follow up with colonoscopy schedule.  5. Follow up with annual dermatology visits.  6. It doesn't seem like the COVID vaccine is working well in lung transplant patients. A number of lung transplant patients have gotten sick with COVID even after receiving the vaccines. Based on our recent experience, it can be life-threatening to get COVID  even after being vaccinated. Please continue to act like you did not get the COVID vaccine - social distancing, wearing a mask, good hand hygiene, etc. If the people around you are vaccinated, it will help reduce the risk of you getting COVID. All members of your household should be vaccinated.  7. We will have the dietician reach out to you.  8. Plan to continue pulmonary rehab  9. Please recheck labs next week. If WBC > 4, we will restart your azathioprine  10. We will see you in about a month for annual studies and DEXA scan (bone density) and do your last bronchoscopy the day after. Please fast for these labs.  11. We recommend getting the bivalent covid vaccine. You can do this at your local pharmacy.  12. Please follow up with Nephrology (kidney team)    Next transplant clinic appointment:  1 month with CXR, labs full PFTs and DEXA  Next lab draw: pending tacrolimus    AVS printed at time of check out        [facility]  [facility]  June 7, 2023         Assessment and Plan:   Sofie Rodriguez is a 63 year old female with h/o COPD s/p BSLT 6/28/22 complicated by post-operative hemidiaphragm palsy, chronic hypercapnia, persistent pleural effusions, recurrent PNAs, positive DSA, EBV viremia, hypogammaglobulinemia, gastroparesis s/p G/J tube placement 7/27/22, pyloric botox 1/25/23, GI bleed 2/2 pyloric ulcer, hemobilia s/p ERCP and MRCP, chronic diarrhea, recurrent C diff colitis, and ESRD on HD T/Th/Sat.  PMH also notable for HFpEF, ASCVD, NTM colonization, hep C, and methamphetamine  use. Admitted 5/17-5/23 for intolerance of tube feedings with weight loss, neutropenia, and failure to thrive. This is her first post discharge follow up.     1. S/p bilateral sequential lung transplant:   Right hemidiaphragm palsy:   Nocturnal hypoxia: no new pulmonary complaints, notes her pulm rehab is going to end soon and she does feel like she would benefit from extended therapy. On RA during the day, using 2L O2 at night. Sating 95% on room air. CMV 5/12 negative. ImmuKnow assay 73 on 5/18, indicative of increased risk of infection (checked in setting of 2-drug IST with [facility] and recurrent C diff). CXR reviewed today and demonstrates stable changes of transplant. PFTs are low, but relatively stable.  - Repeat ImmuKnow assay pending  - Extend pulmonary rehab  - 2L NC overnight--has sleep study the end of the month  - Continue IS including tacrolimus (goal 8-12) and prednisone  - AZA on hold; if WBC > 4 on next check, will restart azathioprine  - Dapsone qMWF for PJP ppx     2. Positive DSA: no prior treatment for [facility]R but has been a concern since pt. initially demonstrated rising DSA post-transplant. 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 Most recently 9100 on 5/18. Also with h/o DR52 Ab (11/2-11/23/22, mfi 846-1072) and CW10 (10/19-12/9/22, mfi 527-1600).    - DSA pending     3. Hypogammaglobulinemia: h/o prior IVIG infusions but most recent IgG adequate at 959 on 5/18.      4. EBV viremia: negative on recheck 5/12.   - EBV pending     5. Severe malnutrition of chronic illness:  Gastroparesis s/p PEG/J and botox:   Pyloric ulcer:  Acute on chronic diarrhea:  Recurrent C diff colitis: chronic diarrhea since transplant with recurrent episodes of C diff, most recently diagnosed on 5/9 and started on PO vancomycin. Stool urgency and frequency persisting with tube feedings but decreased by about half when off cycled feeds. No improvement after formula change as OP on  4/18. Very little PO intake otherwise, reporting similar symptoms in addition to early satiety.  GI consulted 5/18, see their notes for details, consistent with osmotic diarrhea. PO vancomycin stopped given no improvement with medication/completed treatment (?colonization).  Calprotectin feces elevated at 210 on 5/18. Osmolality stool 328 on 5/19.  Transitioned to continuous TF, able to tolerate 30 ml/hr although noted nausea and abdominal cramping with increase to 40 ml/hr. Currently, doing between 20-30 ml/hour continuous (goal is 40 ml /hr), unless she is at HD or has to go somewhere. Notes improved po intake, stools are mushy to liquid, but not worse.  - Imodium 2 mg up to 4 times/day PRN  - Continue TF and diet; will have SARAH Cote follow up  - Scheduled to see Dr. Navarro later this summer to discuss possible G-POEM     6. Leukopenia: Likely 2/2 medications but persisting despite holding Imuran on 5/8. WBC > 4.8 today.  - Recheck labs next week; if > WBC X 2, will resume azathioprine    7. CKD:   HTN: /73, around 11-130s at home. Doing dialysis T/Th/Sat. Labs pending today.     RTC: 4 weeks with annuals and yearly bronch  Vaccinations: recommend repeat bivalent covid vaccine  Annual dermatology visit: following Derm   Preventative: DEXA > June 2023; colonoscopy completed March 2023 (diverticulosis in the sigmoid colon and colonic polyp noted s/p polypectomy)--needs repeat in 2026 per notes, mammogram completed this past winter and was negative    Excluding time spent reading PFTs, I have spent > 55 minutes on the day of encounter reviewing the patient's chart including OSH notes, discussing vitals, labs, imaging and medications with the patient.     Kaylin Triana PA-C  Pulmonary, Allergy, Critical Care and Sleep Medicine        Interval History:     Overall, feeling better. No fever or chills, no allergy complaints. No cough, no new or unexpected shortness of breath, no congestion or tightness. No pain in  "her chest, but can feel the \"ribs\" around her breast, doesn't hurt but feels weird. No nausea unless she tries to advance her TF, then starts with diarrhea, then progresses to nausea. No vomiting, but dry heaves. Stools are mushy (wtihout the feeds on), otherwise pretty runny. Quit fiber when in the hospital, but about the same with fiber. Using imodium twice/day. Eating some food, larger meal last night          Review of Systems:   Please see HPI, otherwise the complete 10 point ROS is negative.           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     Hepatitis 2017    Hep C, [facility]     HTN (hypertension)      Lung infection 11/30/2022     Osteopenia      Past Surgical History:   Procedure Laterality Date     BRONCHOSCOPY (RIGID OR FLEXIBLE), DIAGNOSTIC N/A 8/2/2022    Procedure: BRONCHOSCOPY, DIAGNOSTIC- inspection Bronch;  Surgeon: Kamala Lovell MD;  Location: [facility]     BRONCHOSCOPY (RIGID OR FLEXIBLE), DIAGNOSTIC N/A 9/13/2022    Procedure: INSPECTION BRONCHOSCOPY, WITH BRONCHOALVEOLAR LAVAGE;  Surgeon: Jose R Mccullough MD;  Location: [facility]     BRONCHOSCOPY (RIGID OR FLEXIBLE), DIAGNOSTIC N/A 11/9/2022    Procedure: BRONCHOSCOPY, WITH BRONCHOALVEOLAR LAVAGE AND BIOPSY;  Surgeon: Cesar Lima MD;  Location: [facility]     BRONCHOSCOPY (RIGID OR FLEXIBLE), DIAGNOSTIC N/A 1/25/2023    Procedure: BRONCHOSCOPY, WITH BRONCHOALVEOLAR LAVAGE AND BIOPSY;  Surgeon: Mason Reddy MD;  Location: [facility]     BRONCHOSCOPY (RIGID OR FLEXIBLE), DIAGNOSTIC N/A 4/19/2023    Procedure: BRONCHOSCOPY, WITH BRONCHOALVEOLAR LAVAGE AND BIOPSY;  Surgeon: Kamala Lovell MD;  Location: [facility]     BRONCHOSCOPY FLEXIBLE AND RIGID N/A 07/19/2022    Procedure: BRONCHOSCOPY inspection only;  Surgeon: Bob Liao, " MD;  Location:  GI     COLONOSCOPY  2015     CV CORONARY ANGIOGRAM N/A 06/30/2021    Procedure: CV CORONARY ANGIOGRAM;  Surgeon: Alexander Cuellar MD;  Location:  HEART CARDIAC CATH LAB     CV RIGHT HEART CATH MEASUREMENTS RECORDED N/A 06/30/2021    Procedure: CV RIGHT HEART CATH;  Surgeon: Alexander Cuellar MD;  Location:  HEART CARDIAC CATH LAB     ENDOSCOPIC RETROGRADE CHOLANGIOPANCREATOGRAM N/A 8/11/2022    Procedure: ENDOSCOPIC RETROGRADE CHOLANGIOPANCREATOGRAPHY WITH PANCREATIC DUCT NEEDLE KNIFE AND STENT PLACEMENT, BILE DUCT SPHINCTEROTOMY, BLOOD/DEBRIS REMOVAL AND STENT PLACEMENT;  Surgeon: Cosmo Arroyo MD;  [facility]: [facility]     ENDOSCOPIC RETROGRADE CHOLANGIOPANCREATOGRAM N/A 10/7/2022    Procedure: ENDOSCOPIC RETROGRADE CHOLANGIOPANCREATOGRAPHY with biliary and pancreatic stent removal, debris removal;  Surgeon: Cosmo Arroyo MD;  Location:  OR     ENT SURGERY  1974    tonsillectomy     ENTEROSCOPY SMALL BOWEL N/A 8/11/2022    Procedure: SMALL BOWEL ENTEROSCOPY;  Surgeon: Cosmo Arroyo MD;  Location:  OR     ESOPHAGOGASTRODUODENOSCOPY, WITH NASOGASTRIC TUBE INSERTION N/A 07/01/2022    Procedure: ESOPHAGOGASTRODUODENOSCOPY, WITH NASOJEJUNAL TUBE INSERTION;  Surgeon: Ozzy Nickerson MD;  Location:  GI     ESOPHAGOSCOPY, GASTROSCOPY, DUODENOSCOPY (EGD), COMBINED N/A 8/3/2022    Procedure: ESOPHAGOGASTRODUODENOSCOPY (EGD);  Surgeon: Ira Andres MD;  Location:  GI     ESOPHAGOSCOPY, GASTROSCOPY, DUODENOSCOPY (EGD), COMBINED N/A 1/25/2023    Procedure: ESOPHAGOGASTRODUODENOSCOPY (EGD) with botox injection;  Surgeon: Gonzalez Navarro MD;  Location:  GI     HAND SURGERY       INSERT CHEST TUBE Right 9/13/2022    Procedure: Insert chest tube;  Surgeon: Jose R Mccullough MD;  Location:  GI     IR CVC TUNNEL PLACEMENT > 5 YRS OF AGE  9/26/2022     IR GASTRO JEJUNOSTOMY TUBE CHANGE  8/31/2022     IR GASTRO JEJUNOSTOMY TUBE CHANGE  12/21/2022     IR  GASTRO JEJUNOSTOMY TUBE PLACEMENT  2022     IR THORACENTESIS  2022     LEEP TX, CERVICAL  2017    HECTOR III     LYMPH NODE BIOPSY Left     Left axilla, benign- [place]     MIDLINE INSERTION - DOUBLE LUMEN Left 2022    20cm, Basilic vein     REPLACE GASTROJEJUNOSTOMY TUBE, PERCUTANEOUS  10/7/2022    Procedure: Replace Gastrojejunostomy Tube;  Surgeon: Cosmo Arroyo MD;  [facility]     THORACENTESIS Left 2022    Procedure: THORACENTESIS;  Surgeon: Bo Capone PA-C;  Location: [facility] OR     THORACENTESIS Left 2022    Procedure: Thoracentesis;  Surgeon: Jose R Mccullough MD;  Location: [facility]     THROMBECTOMY UPPER EXTREMITY Left 2022    Procedure: LEFT RADIAL ARM THROMBECTOMY;  Surgeon: Christie Graham MD;  [facility]: [facility]     TRANSPLANT LUNG RECIPIENT SINGLE X2 Bilateral 2022    Procedure: Clamshell Incision, Bilateral Sequential Lung Transplant, On Cardiopulmonary Bypass, Flexible Bronchoscopy;  Surgeon: Sue Sunshine MD;  Location: [facility]           Family History:     No family history on file.         Social History:     Social History     Socioeconomic History     Marital status: Single     Spouse name: Not on file     Number of children: Not on file     Years of education: Not on file     Highest education level: Not on file   Occupational History     Not on file   Tobacco Use     Smoking status: Former     Years: 30.00     Types: Cigarettes     Quit date: 2020     Years since quittin.5     Smokeless tobacco: Never   Vaping Use     Vaping status: Not on file   Substance and Sexual Activity     Alcohol use: Not Currently     Drug use: Not Currently     Types: Marijuana, Methamphetamines     Comment: hx:marijuana and methamphetamine-quit both unsure ?  2-3 yrs ago     Sexual activity: Not on file   Other Topics Concern     Parent/sibling w/ CABG, MI or angioplasty before 65F 55M? Not Asked   Social History Narrative     Not  on file     Social Determinants of Health     Financial Resource Strain: Not on file   Food Insecurity: Not on file   Transportation Needs: Not on file   Physical Activity: Not on file   Stress: Not on file   Social Connections: Not on file   Intimate Partner Violence: Not on file   Housing Stability: Not on file            Medications:     Current Outpatient Medications   Medication     alcohol swab prep pads     azaTHIOprine (IMURAN) 5 mg/mL SUSP     blood glucose (CONTOUR NEXT TEST) test strip     blood glucose (NO BRAND SPECIFIED) lancets standard     blood glucose monitoring (CONTOUR NEXT MONITOR W/DEVICE KIT) meter device kit     calcium carbonate 600 mg-vitamin D 400 units (CALTRATE) 600-400 MG-UNIT per tablet     cyanocobalamin (CYANOCOBALAMIN) 500 MCG tablet     dapsone 2 mg/mL SUSP     insulin pen needle (32G X 4 MM) 32G X 4 MM miscellaneous     loperamide (IMODIUM A-D) 2 MG tablet     metoprolol tartrate (LOPRESSOR) 50 MG tablet     Nutritional Supplements (GLUCOSE MANAGEMENT) TABS     pantoprazole (PROTONIX) 2 mg/mL SUSP suspension     predniSONE (DELTASONE) 5 MG tablet     protein modular (PROSOURCE TF) LIQD     Sharps Container MISC     tacrolimus (GENERIC EQUIVALENT) 1 mg/mL suspension     No current facility-administered medications for this visit.            Physical Exam:   BP (!) 150/73 (BP Location: Right arm, Patient Position: Sitting, Cuff Size: Adult Small)   Pulse 78   SpO2 95%     GENERAL: alert, NAD  HEENT: NCAT, EOMI, no scleral icterus, oral mucosa moist and without lesions  Neck: no cervical or supraclavicular adenopathy  Lungs: good air flow, no crackles, rhonchi or wheezing  CV: RRR, S1S2, no murmurs noted  Abdomen: normoactive BS, soft, non tender  Lymph: no edema  Neuro: AAO X 3, CN 2-12 grossly intact  Psychiatric: normal affect, good eye contact  Skin: no rash, jaundice or lesions on limited exam         Data:   All laboratory and imaging data reviewed.      Recent Results (from  the past 168 hour(s))   Tacrolimus by Tandem Mass Spectrometry    Collection Time: 06/05/23  8:16 AM   Result Value Ref Range    Tacrolimus by Tandem Mass Spectrometry 6.5 5.0 - 15.0 ug/L    Tacrolimus Last Dose Date 6/4/2023     Tacrolimus Last Dose Time  8:00 PM    CBC with platelets    Collection Time: 06/07/23 11:03 AM   Result Value Ref Range    WBC Count 4.8 4.0 - 11.0 10e3/uL    RBC Count 2.92 (L) 3.80 - 5.20 10e6/uL    Hemoglobin 11.1 (L) 11.7 - 15.7 g/dL    Hematocrit 36.2 35.0 - 47.0 %     (H) 78 - 100 fL    MCH 38.0 (H) 26.5 - 33.0 pg    MCHC 30.7 (L) 31.5 - 36.5 g/dL    RDW 16.3 (H) 10.0 - 15.0 %    Platelet Count 213 150 - 450 10e3/uL   General PFT Lab (Please always keep checked)    Collection Time: 06/07/23 11:13 AM   Result Value Ref Range    FVC-Pred 2.78 L    FVC-Pre 1.51 L    FVC-%Pred-Pre 54 %    FEV1-Pre 1.46 L    FEV1-%Pred-Pre 65 %    FEV1FVC-Pred 80 %    FEV1FVC-Pre 97 %    FEFMax-Pred 6.09 L/sec    FEFMax-Pre 4.71 L/sec    FEFMax-%Pred-Pre 77 %    FEF2575-Pred 2.07 L/sec    FEF2575-Pre 2.83 L/sec    MAL9128-%Pred-Pre 136 %    ExpTime-Pre 5.02 sec    FIFMax-Pre 3.58 L/sec    FEV1FEV6-Pred 81 %    FEV1FEV6-Pre 95 %     PFT interpretation:  Maneuver: valid and meets ATS guidelines  Mild obstruction based on Z socre  Compared to prior: FEV1 of 1.46 is 50 ml below prior  The decrease in FVC may represent air trapping v. restrictive physiology.  Lung volumes would be necessary to determine.      Again, thank you for allowing me to participate in the care of your patient.        Sincerely,        Kaylin Triana PA-C

## 2023-06-07 NOTE — LETTER
PHYSICIAN ORDERS      DATE & TIME ISSUED: 2023 12:47 PM  PATIENT NAME: Sofie Rodriguez   : 1960     [facility] MR# [if applicable]: [id number]     DIAGNOSIS:  Lung Transplant  Z94.2  Please draw BMP, CBC and tacrolimus level the week of 23      Any questions please call: Patricia [phone]    Please fax these results to [phone].        Kaylin Triana PA-C

## 2023-06-08 LAB
CMV DNA SPEC NAA+PROBE-ACNC: NOT DETECTED IU/ML
DONOR IDENTIFICATION: NORMAL
DQB2: 7847
DSA COMMENTS: NORMAL
DSA PRESENT: YES
DSA TEST METHOD: NORMAL
EBV DNA COPIES/ML, INSTRUMENT: ABNORMAL COPIES/ML
EBV DNA SPEC NAA+PROBE-LOG#: 4.6 {LOG_COPIES}/ML
ORGAN: NORMAL
SA 1 CELL: NORMAL
SA 1 TEST METHOD: NORMAL
SA 2 CELL: NORMAL
SA 2 TEST METHOD: NORMAL
SA1 HI RISK ABY: NORMAL
SA1 MOD RISK ABY: NORMAL
SA2 HI RISK ABY: NORMAL
SA2 MOD RISK ABY: NORMAL
UNACCEPTABLE ANTIGENS: NORMAL
UNOS CPRA: 37
ZZZSA 1  COMMENTS: NORMAL
ZZZSA 2 COMMENTS: NORMAL

## 2023-06-09 ENCOUNTER — DOCUMENTATION ONLY (OUTPATIENT)
Dept: TRANSPLANT | Facility: CLINIC | Age: 63
End: 2023-06-09
Payer: MEDICARE

## 2023-06-09 ENCOUNTER — TRANSFERRED RECORDS (OUTPATIENT)
Dept: HEALTH INFORMATION MANAGEMENT | Facility: CLINIC | Age: 63
End: 2023-06-09
Payer: MEDICARE

## 2023-06-09 LAB — IMMUKNOW IMMUNE CELL FUNCTION: 43 NG/ML

## 2023-06-09 NOTE — PROGRESS NOTES
Messaged Sofie to check in on nutrition. Her tube feed goal upon hospital discharge was 40 ml/hr x 24 hours.     Sofie replied:    Yes, I've been running at 20 - 30 ml per hour. Yes the goal was 40 and to even try to increase it in increments of 5. So far this hasn't worked for me. I keep trying, but no luck yet.     My eating has improved and my weight has been pretty steady at 120.      Interventions:  - Encouraged Sofie to keep an eye on her weight & reach out if any questions come up  - Consider adding a protein shake or smoothie made with protein powder to supplement for not being able to run TF at goal

## 2023-06-09 NOTE — RESULT ENCOUNTER NOTE
EBV 43,465 log 4.6, was 3,218 log 3.5 4 months ago.     Per Kaylin Triana: recheck EBV in two weeks

## 2023-06-09 NOTE — LETTER
PHYSICIAN ORDERS      DATE & TIME ISSUED: 2023 8:55 AM  PATIENT NAME: Sofie Rodriguez   : 1960     [facility] MR# [if applicable]: [id number]     DIAGNOSIS:  Lung Transplant  Z94.2    Please re-enroll patient in pulmonary rehab twice weekly or whatever insurance will cover     Any questions please call: Girma [phone]    Please fax these results to [phone].        Kaylin Triana PA-C        PATIENT DEMOGRAPHICS:   Name: Sofie Rodriguez MRN: [id number]   Address: [address] [place] [address] Sex: Female   City//Zip: [place] [address] : 1960   Home Phone: [phone] Work Phone:     [facility]: [place] Cell Phone: [phone]       EMERGENCY CONTACT:  Contact Name: Solange Chris Relationship: Sister   Home Phone: [phone] Work Phone:         Cell Phone: [phone]      GUARANTOR INFORMATION: Relationship to Patient:   Name:         Address:   Account Type:     City/[facility]   Employer:     Home Phone:   Work Phone:          COVERAGE INFORMATION:  Primary Payor: Medicare Plan: Medicare   Subscriber: Sofie Rodriguez Group #:     Subscriber Sex: Female       Subscriber : 1960 Patient Rel'ship: Self   Subscriber Effective Date:   Member Effective Date: 10/1/2017    Subscriber ID [id number] Member ID: [id number]      Secondary Payor: [facility] Plan: [facility]   Subscriber: Sofie Rodriguez Group #: [id number]   Subscriber Sex: Female       Subscriber : 1960 Patient Rel'ship: Self   Subscriber Effective Date:   Member Effective Date: 2022   Subscriber ID [id number] Member ID: [id number]      PROVIDER INFORMATION:  Referring Physician:   Referring Address:     Referring Phone: N/A Referring Fax:     Primary Physician: Vinny Berrios Primary Address: [address] [facility] [address]   Primary Phone: [phone] Primary Fax: [phone]

## 2023-06-12 NOTE — LETTER
PHYSICIAN ORDERS      DATE & TIME ISSUED: 2023 9:26 AM  PATIENT NAME: Sofie Rodriguez. Phone [phone]   : 1960     [facility] MR# [if applicable]: [id number]     DIAGNOSIS:  Lung Transplant  Z94.2. Long term use of steroids Z79.5    Order for DEXA scan         Any questions please call: Girma [phone]    Please fax these results to [phone].        Kaylin Triana PA-C

## 2023-06-13 DIAGNOSIS — Z94.2 LUNG REPLACED BY TRANSPLANT (H): ICD-10-CM

## 2023-06-13 RX ORDER — PREDNISONE 5 MG/1
TABLET ORAL
Qty: 135 TABLET | Refills: 3 | Status: SHIPPED | OUTPATIENT
Start: 2023-06-13 | End: 2023-10-16

## 2023-06-13 NOTE — LETTER
PHYSICIAN ORDERS      DATE & TIME ISSUED: 2023 1:07 PM  PATIENT NAME: Sofie Rodriguez: Phone [phone]   : 1960     [facility] MR# [if applicable]: [id number]     DIAGNOSIS:  Lung Transplant  Z94.2    Please fax image (tracing) of ECG done 23 to [phone]    Any questions please call: Girma [phone]    Please fax these results to [phone].         Claribel Franks MD  Pulmonary Disease

## 2023-06-14 LAB
ACID FAST STAIN (ARUP): NORMAL

## 2023-06-16 ENCOUNTER — APPOINTMENT (OUTPATIENT)
Dept: LAB | Facility: CLINIC | Age: 63
End: 2023-06-16
Payer: MEDICARE

## 2023-06-16 PROCEDURE — 80197 ASSAY OF TACROLIMUS: CPT

## 2023-06-16 PROCEDURE — 80197 ASSAY OF TACROLIMUS: CPT | Performed by: PHYSICIAN ASSISTANT

## 2023-06-17 LAB
TACROLIMUS BLD-MCNC: 10.6 UG/L (ref 5–15)
TME LAST DOSE: NORMAL H
TME LAST DOSE: NORMAL H

## 2023-06-19 ENCOUNTER — TRANSFERRED RECORDS (OUTPATIENT)
Dept: HEALTH INFORMATION MANAGEMENT | Facility: CLINIC | Age: 63
End: 2023-06-19
Payer: MEDICARE

## 2023-06-19 LAB
IC SERPL C1Q BIND-ACNC: 7.6 UG EQ/ML
IC SERPL RAJI CELL-ACNC: 12 UG EQ/ML

## 2023-06-19 NOTE — RESULT ENCOUNTER NOTE
Tacrolimus level 10.6 at 12 hours on 10/16/23  Goal 8-12.     No change in dose, level at goal   MyChart message sent to patient

## 2023-06-20 DIAGNOSIS — J98.6 DIAPHRAGM DYSFUNCTION: ICD-10-CM

## 2023-06-22 LAB — HEP C HIM: NORMAL

## 2023-06-23 ENCOUNTER — LAB (OUTPATIENT)
Dept: LAB | Facility: CLINIC | Age: 63
End: 2023-06-23
Payer: MEDICARE

## 2023-06-23 DIAGNOSIS — Z94.2 LUNG REPLACED BY TRANSPLANT (H): ICD-10-CM

## 2023-06-23 PROCEDURE — 80197 ASSAY OF TACROLIMUS: CPT

## 2023-06-23 PROCEDURE — 87799 DETECT AGENT NOS DNA QUANT: CPT

## 2023-06-24 LAB
TACROLIMUS BLD-MCNC: 11.5 UG/L (ref 5–15)
TME LAST DOSE: NORMAL H
TME LAST DOSE: NORMAL H

## 2023-06-26 LAB
EBV DNA COPIES/ML, INSTRUMENT: ABNORMAL COPIES/ML
EBV DNA SPEC NAA+PROBE-LOG#: 4.7 {LOG_COPIES}/ML

## 2023-06-28 ENCOUNTER — TELEPHONE (OUTPATIENT)
Dept: TRANSPLANT | Facility: CLINIC | Age: 63
End: 2023-06-28
Payer: MEDICARE

## 2023-06-28 NOTE — TELEPHONE ENCOUNTER
Transplant Social Work Services Progress Note    Patient called on her anniversary of her lung transplant today.  Is ready to write her donor family. Requests info on how to do so.  Emailed her brochure and gave her some instructions.        Mana Valdivia [facility]  Lung Transplant   Phone: [phone] Pager: [phone]

## 2023-06-29 NOTE — NURSING NOTE
Chief Complaint   Patient presents with     RECHECK     Return Lung TX     Medications reviewed and vital signs taken.   Brenda Guzmán, CMA     
Other

## 2023-07-09 NOTE — PROGRESS NOTES
[facility]  July 11, 2023         Assessment and Plan:   Sofie Rodriguez is a 63 year old female with h/o COPD s/p BSLT 6/28/22 with course complicated by post-operative hemidiaphragm palsy, persistent pleural effusions, recurrent PNAs, positive DSA, EBV viremia, hypogammaglobulinemia, severe gastroparesis s/p G/J tube placement 7/27/22 with pyloric botox 1/25/23, GI bleed 2/2 pyloric ulcer, hemobilia s/p ERCP and MRCP, chronic diarrhea, recurrent C diff colitis, and ESRD on HD T/Th/Sat.  PMH also notable for HFpEF, ASCVD, NTM colonization, hep C, and methamphetamine use. Admitted 5/17-5/23 for intolerance of tube feedings with weight loss, neutropenia, and failure to thrive. She is seen today for routine follow up and at this time, it is noted that her feeding tube is coiled in her stomach, she has had worsening intolerance to TF and she has had a 17 lb weight loss since last visit. Tube will be repositioned tomorrow with plan for admission in one week if TF tolerance and weight not improving.     1. S/p bilateral lung transplant:   Right hemidiaphragm palsy:   Nocturnal hypoxia: no new pulmonary complaints, has started PT back up for leg weakness. On RA during the day, using 2L O2 at night. Sating 97% on room air. CMV 6/7 negative. ImmuKnow assay 73 on 5/18, indicative of increased risk of infection, down to 43 on recheck. CXR reviewed today and demonstrates stable changes of transplant. PFTs are low, have declined 110 ml since December. Has surveillance bronch/BAL and biopsies tomorrow.   - 2L NC overnight--has sleep study on July 17  - Continue IS including tacrolimus (goal 8-12) and prednisone  - AZA on hold--given low ImmunKnow, would continue to hold for now  - Dapsone qMWF for PJP ppx  - If PFTs remain low, will order SNIFF test     2. Positive DSA: no prior treatment for AMR but has been a concern since pt. initially demonstrated rising DSA  post-transplant. Ongoing positive DQB2 (MFI of 7847, down from 9100) on 6/7.   - DSA pending    3. Hypogammaglobulinemia: h/o prior IVIG infusions but most recent IgG adequate at 959 on 5/18.      4. EBV viremia: last level of 49K (log 4.7) on 6/23.   - Recheck with next visit     5. Severe malnutrition of chronic illness:  Gastroparesis s/p PEG/J and botox:   Pyloric ulcer:  Acute on chronic diarrhea:  Recurrent C diff colitis: chronic diarrhea since transplant with recurrent episodes of C diff, most recently diagnosed on 5/9 and started on PO vancomycin. Stool urgency and frequency persisting with tube feedings but decreased by about half when off cycled feeds. No improvement after formula change as OP on 4/18. Very little PO intake otherwise, reporting similar symptoms in addition to early satiety. GI consulted 5/18, see their notes for details, consistent with osmotic diarrhea. PO vancomycin stopped given no improvement with medication/completed treatment (?colonization). Calprotectin feces elevated at 210 on 5/18. Osmolality stool 328 on 5/19. Transitioned to continuous TF, able to tolerate 30 ml/hr although noted nausea and abdominal cramping with increase to 40 ml/hr, which is her goal. As OP able to get between  20-30 ml/hour, however, has not been able to do more than 1 carton a day for the last few weeks (goal 4 cartons per day). Runs TF typically after her morning medications, very minimal po intake. Still having 6-7 liquid stools at night, even with Imodium 3 times/day. Per review of chart, patient has lot 17 lbs since her last visit. On imaging today, tube is coiled in the stomach.  - Repositioning of tube tomorrow  - Will have Kera DAS call patient asap with TF plan; if not improving with tolerance and weight in one week, will admit to hospital  - Imodium 2 mg up to 4 times/day PRN  - Scheduled to see Dr. Navarro later this summer to discuss possible G-POEM    6. CKD:   HTN: BP overall controlled. Doing  dialysis T/Th/Sat.    Repeat fasting lipid panel    RTC: phone check in one week, visit in two weeks  Vaccinations: recommend repeat bivalent covid vaccine  Annual dermatology visit: following Derm   Preventative: DEXA > June 2023; colonoscopy completed March 2023 (diverticulosis in the sigmoid colon and colonic polyp noted s/p polypectomy)--needs repeat in 2026 per notes, mammogram completed this past winter and was negative    Kaylni Triana PA-C  Pulmonary, Allergy, Critical Care and Sleep Medicine        Interval History:     Trying to walk around the block twice/day. Also has started therapy for her legs, PT one/day every two weeks and she has exercises to do at home. No fever or chills, no cough or new shortness of breath. Trying to use her feeding tube, can only do 20-30 ml/hr, only using 1 carton per day (is supposed to be on 4 cartons per day). Feels her tube feed tolerance has decreased over the last few weeks, was at 2-3 cartons after discharge. Notes she has been eating more, but her weight is significantly down. Biggest issue is nausea with both eating and TF, did throw up X 1, mostly foam. Stools are mainly diarrhea, 6-7 liquid stools at night.           Review of Systems:   Please see HPI, otherwise the complete 10 point ROS is negative.           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     Hepatitis 2017    Hep C, [facility]     HTN (hypertension)      Lung infection 11/30/2022     Osteopenia      Past Surgical History:   Procedure Laterality Date     BRONCHOSCOPY (RIGID OR FLEXIBLE), DIAGNOSTIC N/A 8/2/2022    Procedure: BRONCHOSCOPY, DIAGNOSTIC- inspection Bronch;  Surgeon: Kamala Lovell MD;  Location:  GI     BRONCHOSCOPY (RIGID OR FLEXIBLE), DIAGNOSTIC N/A 9/13/2022    Procedure:  INSPECTION BRONCHOSCOPY, WITH BRONCHOALVEOLAR LAVAGE;  Surgeon: Jose R Mccullough MD;  Location:  GI     BRONCHOSCOPY (RIGID OR FLEXIBLE), DIAGNOSTIC N/A 11/9/2022    Procedure: BRONCHOSCOPY, WITH BRONCHOALVEOLAR LAVAGE AND BIOPSY;  Surgeon: Cesar Lima MD;  Location:  GI     BRONCHOSCOPY (RIGID OR FLEXIBLE), DIAGNOSTIC N/A 1/25/2023    Procedure: BRONCHOSCOPY, WITH BRONCHOALVEOLAR LAVAGE AND BIOPSY;  Surgeon: Mason Reddy MD;  Location:  GI     BRONCHOSCOPY (RIGID OR FLEXIBLE), DIAGNOSTIC N/A 4/19/2023    Procedure: BRONCHOSCOPY, WITH BRONCHOALVEOLAR LAVAGE AND BIOPSY;  Surgeon: Kamala Lovell MD;  Location:  GI     BRONCHOSCOPY FLEXIBLE AND RIGID N/A 07/19/2022    Procedure: BRONCHOSCOPY inspection only;  Surgeon: Bob Liao MD;  Location:  GI     COLONOSCOPY  2015     CV CORONARY ANGIOGRAM N/A 06/30/2021    Procedure: CV CORONARY ANGIOGRAM;  Surgeon: Alexander Cuellar MD;  Location:  HEART CARDIAC CATH LAB     CV RIGHT HEART CATH MEASUREMENTS RECORDED N/A 06/30/2021    Procedure: CV RIGHT HEART CATH;  Surgeon: Alexander Cuellar MD;  Location:  HEART CARDIAC CATH LAB     ENDOSCOPIC RETROGRADE CHOLANGIOPANCREATOGRAM N/A 8/11/2022    Procedure: ENDOSCOPIC RETROGRADE CHOLANGIOPANCREATOGRAPHY WITH PANCREATIC DUCT NEEDLE KNIFE AND STENT PLACEMENT, BILE DUCT SPHINCTEROTOMY, BLOOD/DEBRIS REMOVAL AND STENT PLACEMENT;  Surgeon: Cosmo Arroyo MD;  Location:  OR     ENDOSCOPIC RETROGRADE CHOLANGIOPANCREATOGRAM N/A 10/7/2022    Procedure: ENDOSCOPIC RETROGRADE CHOLANGIOPANCREATOGRAPHY with biliary and pancreatic stent removal, debris removal;  Surgeon: Cosmo Arroyo MD;  Location:  OR     ENT SURGERY  1974    tonsillectomy     ENTEROSCOPY SMALL BOWEL N/A 8/11/2022    Procedure: SMALL BOWEL ENTEROSCOPY;  Surgeon: Cosmo Arroyo MD;  Location:  OR     ESOPHAGOGASTRODUODENOSCOPY, WITH NASOGASTRIC TUBE INSERTION N/A 07/01/2022    Procedure:  ESOPHAGOGASTRODUODENOSCOPY, WITH NASOJEJUNAL TUBE INSERTION;  Surgeon: Ozzy Nickerson MD;  Location:  GI     ESOPHAGOSCOPY, GASTROSCOPY, DUODENOSCOPY (EGD), COMBINED N/A 8/3/2022    Procedure: ESOPHAGOGASTRODUODENOSCOPY (EGD);  Surgeon: Ira Andres MD;  Location:  GI     ESOPHAGOSCOPY, GASTROSCOPY, DUODENOSCOPY (EGD), COMBINED N/A 1/25/2023    Procedure: ESOPHAGOGASTRODUODENOSCOPY (EGD) with botox injection;  Surgeon: Gonzalez Navarro MD;  Location:  GI     HAND SURGERY       INSERT CHEST TUBE Right 9/13/2022    Procedure: Insert chest tube;  Surgeon: Jose R Mccullough MD;  Location:  GI     IR CVC TUNNEL PLACEMENT > 5 YRS OF AGE  9/26/2022     IR GASTRO JEJUNOSTOMY TUBE CHANGE  8/31/2022     IR GASTRO JEJUNOSTOMY TUBE CHANGE  12/21/2022     IR GASTRO JEJUNOSTOMY TUBE PLACEMENT  7/27/2022     IR THORACENTESIS  8/29/2022     LEEP TX, CERVICAL  04/07/2017    HECTOR III     LYMPH NODE BIOPSY Left 2005    Left axilla, benign- Lamoni     MIDLINE INSERTION - DOUBLE LUMEN Left 07/28/2022    20cm, Basilic vein     REPLACE GASTROJEJUNOSTOMY TUBE, PERCUTANEOUS  10/7/2022    Procedure: Replace Gastrojejunostomy Tube;  Surgeon: Cosmo Arroyo MD;  Location: [facility] OR     THORACENTESIS Left 8/29/2022    Procedure: THORACENTESIS;  Surgeon: Bo Capone PA-C;  Location: [facility]     THORACENTESIS Left 9/13/2022    Procedure: Thoracentesis;  Surgeon: Jose R Mccullough MD;  Location: [facility] GI     THROMBECTOMY UPPER EXTREMITY Left 07/02/2022    Procedure: LEFT RADIAL ARM THROMBECTOMY;  Surgeon: Christie Graham MD;  Location:  OR     TRANSPLANT LUNG RECIPIENT SINGLE X2 Bilateral 06/28/2022    Procedure: Clamshell Incision, Bilateral Sequential Lung Transplant, On Cardiopulmonary Bypass, Flexible Bronchoscopy;  Surgeon: Sue Sunshine MD;  Location:  OR           Family History:     No family history on file.         Social History:     Social History     Socioeconomic History      Marital status: Single     Spouse name: Not on file     Number of children: Not on file     Years of education: Not on file     Highest education level: Not on file   Occupational History     Not on file   Tobacco Use     Smoking status: Former     Years: 30.00     Types: Cigarettes     Quit date: 2020     Years since quittin.6     Smokeless tobacco: Never   Substance and Sexual Activity     Alcohol use: Not Currently     Drug use: Not Currently     Types: Marijuana, Methamphetamines     Comment: hx:marijuana and methamphetamine-quit both unsure ?  2-3 yrs ago     Sexual activity: Not on file   Other Topics Concern     Parent/sibling w/ CABG, MI or angioplasty before 65F 55M? Not Asked   Social History Narrative     Not on file     Social Determinants of Health     Financial Resource Strain: Not on file   Food Insecurity: Not on file   Transportation Needs: Not on file   Physical Activity: Not on file   Stress: Not on file   Social Connections: Not on file   Intimate Partner Violence: Not on file   Housing Stability: Not on file            Medications:     Current Outpatient Medications   Medication     alcohol swab prep pads     azaTHIOprine (IMURAN) 5 mg/mL SUSP     blood glucose (CONTOUR NEXT TEST) test strip     blood glucose (NO BRAND SPECIFIED) lancets standard     blood glucose monitoring (CONTOUR NEXT MONITOR W/DEVICE KIT) meter device kit     Calcium Carbonate-Vitamin D 600-10 MG-MCG TABS     cyanocobalamin (CYANOCOBALAMIN) 500 MCG tablet     dapsone 2 mg/mL SUSP     insulin pen needle (32G X 4 MM) 32G X 4 MM miscellaneous     loperamide (IMODIUM A-D) 2 MG tablet     metoprolol tartrate (LOPRESSOR) 50 MG tablet     Nutritional Supplements (GLUCOSE MANAGEMENT) TABS     pantoprazole (PROTONIX) 2 mg/mL SUSP suspension     predniSONE (DELTASONE) 5 MG tablet     protein modular (PROSOURCE TF) LIQD     Sharps Container MISC     tacrolimus (GENERIC EQUIVALENT) 1 mg/mL suspension     No current  facility-administered medications for this visit.     Facility-Administered Medications Ordered in Other Visits   Medication     lidocaine (XYLOCAINE) 2 % external gel            Physical Exam:   /76 (BP Location: Right arm, Patient Position: Chair, Cuff Size: Adult Regular)   Pulse 87   SpO2 100%     GENERAL: alert, NAD  HEENT: NCAT, EOMI, no scleral icterus, oral mucosa moist and without lesions  Neck: no cervical or supraclavicular adenopathy  Lungs: moderate air flow, mainly clear  CV: RRR, S1S2, no murmurs noted  Abdomen: normoactive BS, soft, non tender  Lymph: no edema  Neuro: AAO X 3, CN 2-12 grossly intact  Psychiatric: normal affect, good eye contact  Skin: no rash, jaundice or lesions on limited exam         Data:   All laboratory and imaging data reviewed.      Recent Results (from the past 168 hour(s))   6 minute walk test    Collection Time: 07/11/23 12:00 AM   Result Value Ref Range    6 min walk (FT) 1,450 1,423 ft    6 Min Walk (M) 442 434 m   Magnesium    Collection Time: 07/11/23 12:17 PM   Result Value Ref Range    Magnesium 2.0 1.7 - 2.3 mg/dL   Phosphorus    Collection Time: 07/11/23 12:17 PM   Result Value Ref Range    Phosphorus 2.1 (L) 2.5 - 4.5 mg/dL   Comprehensive metabolic panel    Collection Time: 07/11/23 12:17 PM   Result Value Ref Range    Sodium 140 136 - 145 mmol/L    Potassium 4.4 3.4 - 5.3 mmol/L    Chloride 101 98 - 107 mmol/L    Carbon Dioxide (CO2) 29 22 - 29 mmol/L    Anion Gap 10 7 - 15 mmol/L    Urea Nitrogen 10.2 8.0 - 23.0 mg/dL    Creatinine 3.40 (H) 0.51 - 0.95 mg/dL    Calcium 9.4 8.8 - 10.2 mg/dL    Glucose 104 (H) 70 - 99 mg/dL    Alkaline Phosphatase 65 35 - 104 U/L    AST 23 0 - 45 U/L    ALT 7 0 - 50 U/L    Protein Total 7.1 6.4 - 8.3 g/dL    Albumin 4.2 3.5 - 5.2 g/dL    Bilirubin Total 0.6 <=1.2 mg/dL    GFR Estimate 15 (L) >60 mL/min/1.73m2   Hemoglobin A1c    Collection Time: 07/11/23 12:17 PM   Result Value Ref Range    Hemoglobin A1C <4.2 <5.7 %    Lipid Profile    Collection Time: 07/11/23 12:17 PM   Result Value Ref Range    Cholesterol 268 (H) <200 mg/dL    Triglycerides 187 (H) <150 mg/dL    Direct Measure HDL 91 >=50 mg/dL    LDL Cholesterol Calculated 140 (H) <=100 mg/dL    Non HDL Cholesterol 177 (H) <130 mg/dL   INR    Collection Time: 07/11/23 12:17 PM   Result Value Ref Range    INR 0.92 0.85 - 1.15   CBC with platelets and differential    Collection Time: 07/11/23 12:17 PM   Result Value Ref Range    WBC Count 4.2 4.0 - 11.0 10e3/uL    RBC Count 3.57 (L) 3.80 - 5.20 10e6/uL    Hemoglobin 12.8 11.7 - 15.7 g/dL    Hematocrit 40.4 35.0 - 47.0 %     (H) 78 - 100 fL    MCH 35.9 (H) 26.5 - 33.0 pg    MCHC 31.7 31.5 - 36.5 g/dL    RDW 15.8 (H) 10.0 - 15.0 %    Platelet Count 160 150 - 450 10e3/uL   Manual Differential    Collection Time: 07/11/23 12:17 PM   Result Value Ref Range    % Neutrophils 72 %    % Lymphocytes 15 %    % Monocytes 9 %    % Eosinophils 1 %    % Basophils 0 %    % Metamyelocytes 3 %    Absolute Neutrophils 3.0 1.6 - 8.3 10e3/uL    Absolute Lymphocytes 0.6 (L) 0.8 - 5.3 10e3/uL    Absolute Monocytes 0.4 0.0 - 1.3 10e3/uL    Absolute Eosinophils 0.0 0.0 - 0.7 10e3/uL    Absolute Basophils 0.0 0.0 - 0.2 10e3/uL    Absolute Metamyelocytes 0.1 (H) <=0.0 10e3/uL    RBC Morphology Confirmed RBC Indices     Platelet Assessment  Automated Count Confirmed. Platelet morphology is normal.     Automated Count Confirmed. Platelet morphology is normal.   X-ray Chest 2 vws*    Collection Time: 07/11/23 12:29 PM   Result Value Ref Range    Radiologist flags (Urgent)      Percutaneous gastrojejunostomy tube retracted into the   General PFT Lab (Please always keep checked)    Collection Time: 07/11/23 12:34 PM   Result Value Ref Range    FVC-Pred 2.78 L    FVC-Pre 1.44 L    FVC-%Pred-Pre 51 %    FEV1-Pre 1.43 L    FEV1-%Pred-Pre 64 %    FEV1FVC-Pred 80 %    FEV1FVC-Pre 99 %    FEFMax-Pred 6.09 L/sec    FEFMax-Pre 4.85 L/sec    FEFMax-%Pred-Pre  79 %    FEF2575-Pred 2.07 L/sec    FEF2575-Pre 4.18 L/sec    FJR2713-%Pred-Pre 201 %    ExpTime-Pre 6.21 sec    FIFMax-Pre 3.55 L/sec    VC-Pred 3.26 L    VC-Pre 1.38 L    VC-%Pred-Pre 42 %    IC-Pred 2.37 L    IC-Pre 1.12 L    IC-%Pred-Pre 47 %    ERV-Pred 0.79 L    ERV-Pre 0.26 L    ERV-%Pred-Pre 33 %    FEV1FEV6-Pred 81 %    FEV1FEV6-Pre 99 %    FRCPleth-Pred 2.61 L    FRCPleth-Pre 2.63 L    FRCPleth-%Pred-Pre 100 %    RVPleth-Pred 1.68 L    RVPleth-Pre 2.37 L    RVPleth-%Pred-Pre 140 %    TLCPleth-Pred 4.92 L    TLCPleth-Pre 3.75 L    TLCPleth-%Pred-Pre 76 %    DLCOunc-Pred 19.18 ml/min/mmHg    DLCOunc-Pre 14.88 ml/min/mmHg    DLCOunc-%Pred-Pre 77 %    VA-Pre 2.60 L    VA-%Pred-Pre 57 %    FEV1SVC-Pred 68 %    FEV1SVC-Pre 104 %   Gram Stain    Collection Time: 07/12/23  9:22 AM    Specimen: Bronchus; Bronchial Alveolar Lavage   Result Value Ref Range    Gram Stain Result <25 PMNs/low power field     Gram Stain Result No organisms seen    Cell Count Body Fluid    Collection Time: 07/12/23  9:23 AM   Result Value Ref Range    Color Colorless Colorless, Yellow    Clarity Hazy (A) Clear    Cell Count Fluid Source Lung, Middle Lobe, Right     Total Nucleated Cells 93 /uL   Differential Body Fluid    Collection Time: 07/12/23  9:23 AM   Result Value Ref Range    % Neutrophils 1 %    % Lymphocytes 7 %    % Monocyte/Macrophages 92 %     PFT interpretation:  Maneuver: valid, but ATS not met    6 MWT on RA > LLN with significant desaturation, but not significant hypoxia

## 2023-07-11 ENCOUNTER — ANCILLARY PROCEDURE (OUTPATIENT)
Dept: GENERAL RADIOLOGY | Facility: CLINIC | Age: 63
End: 2023-07-11
Attending: PHYSICIAN ASSISTANT
Payer: MEDICARE

## 2023-07-11 ENCOUNTER — OFFICE VISIT (OUTPATIENT)
Dept: PULMONOLOGY | Facility: CLINIC | Age: 63
End: 2023-07-11
Attending: PHYSICIAN ASSISTANT
Payer: MEDICARE

## 2023-07-11 ENCOUNTER — LAB (OUTPATIENT)
Dept: LAB | Facility: CLINIC | Age: 63
End: 2023-07-11
Attending: PHYSICIAN ASSISTANT
Payer: MEDICARE

## 2023-07-11 VITALS — SYSTOLIC BLOOD PRESSURE: 119 MMHG | HEART RATE: 87 BPM | OXYGEN SATURATION: 100 % | DIASTOLIC BLOOD PRESSURE: 76 MMHG

## 2023-07-11 DIAGNOSIS — T38.0X5A IMMUNOSUPPRESSION DUE TO CHRONIC STEROID USE (H): ICD-10-CM

## 2023-07-11 DIAGNOSIS — Z94.2 S/P LUNG TRANSPLANT (H): ICD-10-CM

## 2023-07-11 DIAGNOSIS — D80.1 HYPOGAMMAGLOBULINEMIA (H): ICD-10-CM

## 2023-07-11 DIAGNOSIS — Z79.52 IMMUNOSUPPRESSION DUE TO CHRONIC STEROID USE (H): ICD-10-CM

## 2023-07-11 DIAGNOSIS — D84.821 IMMUNOSUPPRESSION DUE TO CHRONIC STEROID USE (H): ICD-10-CM

## 2023-07-11 DIAGNOSIS — R79.9 ABNORMAL FINDING OF BLOOD CHEMISTRY, UNSPECIFIED: ICD-10-CM

## 2023-07-11 DIAGNOSIS — K31.84 GASTROPARESIS: Primary | ICD-10-CM

## 2023-07-11 DIAGNOSIS — Z94.2 LUNG REPLACED BY TRANSPLANT (H): ICD-10-CM

## 2023-07-11 LAB
6 MIN WALK (FT): 1450 FT
6 MIN WALK (M): 442 M
ALBUMIN SERPL BCG-MCNC: 4.2 G/DL (ref 3.5–5.2)
ALP SERPL-CCNC: 65 U/L (ref 35–104)
ALT SERPL W P-5'-P-CCNC: 7 U/L (ref 0–50)
ANION GAP SERPL CALCULATED.3IONS-SCNC: 10 MMOL/L (ref 7–15)
AST SERPL W P-5'-P-CCNC: 23 U/L (ref 0–45)
BASOPHILS # BLD MANUAL: 0 10E3/UL (ref 0–0.2)
BASOPHILS NFR BLD MANUAL: 0 %
BILIRUB SERPL-MCNC: 0.6 MG/DL
BUN SERPL-MCNC: 10.2 MG/DL (ref 8–23)
CALCIUM SERPL-MCNC: 9.4 MG/DL (ref 8.8–10.2)
CHLORIDE SERPL-SCNC: 101 MMOL/L (ref 98–107)
CHOLEST SERPL-MCNC: 268 MG/DL
CREAT SERPL-MCNC: 3.4 MG/DL (ref 0.51–0.95)
DEPRECATED HCO3 PLAS-SCNC: 29 MMOL/L (ref 22–29)
DLCOUNC-%PRED-PRE: 77 %
DLCOUNC-PRE: 14.88 ML/MIN/MMHG
DLCOUNC-PRED: 19.18 ML/MIN/MMHG
EOSINOPHIL # BLD MANUAL: 0 10E3/UL (ref 0–0.7)
EOSINOPHIL NFR BLD MANUAL: 1 %
ERV-%PRED-PRE: 33 %
ERV-PRE: 0.26 L
ERV-PRED: 0.79 L
ERYTHROCYTE [DISTWIDTH] IN BLOOD BY AUTOMATED COUNT: 15.8 % (ref 10–15)
EXPTIME-PRE: 6.21 SEC
FEF2575-%PRED-PRE: 201 %
FEF2575-PRE: 4.18 L/SEC
FEF2575-PRED: 2.07 L/SEC
FEFMAX-%PRED-PRE: 79 %
FEFMAX-PRE: 4.85 L/SEC
FEFMAX-PRED: 6.09 L/SEC
FEV1-%PRED-PRE: 64 %
FEV1-PRE: 1.43 L
FEV1FEV6-PRE: 99 %
FEV1FEV6-PRED: 81 %
FEV1FVC-PRE: 99 %
FEV1FVC-PRED: 80 %
FEV1SVC-PRE: 104 %
FEV1SVC-PRED: 68 %
FIFMAX-PRE: 3.55 L/SEC
FRCPLETH-%PRED-PRE: 100 %
FRCPLETH-PRE: 2.63 L
FRCPLETH-PRED: 2.61 L
FVC-%PRED-PRE: 51 %
FVC-PRE: 1.44 L
FVC-PRED: 2.78 L
GFR SERPL CREATININE-BSD FRML MDRD: 15 ML/MIN/1.73M2
GLUCOSE SERPL-MCNC: 104 MG/DL (ref 70–99)
HBA1C MFR BLD: <4.2 %
HCT VFR BLD AUTO: 40.4 % (ref 35–47)
HDLC SERPL-MCNC: 91 MG/DL
HGB BLD-MCNC: 12.8 G/DL (ref 11.7–15.7)
IC-%PRED-PRE: 47 %
IC-PRE: 1.12 L
IC-PRED: 2.37 L
INR PPP: 0.92 (ref 0.85–1.15)
LDLC SERPL CALC-MCNC: 140 MG/DL
LYMPHOCYTES # BLD MANUAL: 0.6 10E3/UL (ref 0.8–5.3)
LYMPHOCYTES NFR BLD MANUAL: 15 %
MAGNESIUM SERPL-MCNC: 2 MG/DL (ref 1.7–2.3)
MCH RBC QN AUTO: 35.9 PG (ref 26.5–33)
MCHC RBC AUTO-ENTMCNC: 31.7 G/DL (ref 31.5–36.5)
MCV RBC AUTO: 113 FL (ref 78–100)
METAMYELOCYTES # BLD MANUAL: 0.1 10E3/UL
METAMYELOCYTES NFR BLD MANUAL: 3 %
MONOCYTES # BLD MANUAL: 0.4 10E3/UL (ref 0–1.3)
MONOCYTES NFR BLD MANUAL: 9 %
NEUTROPHILS # BLD MANUAL: 3 10E3/UL (ref 1.6–8.3)
NEUTROPHILS NFR BLD MANUAL: 72 %
NONHDLC SERPL-MCNC: 177 MG/DL
PHOSPHATE SERPL-MCNC: 2.1 MG/DL (ref 2.5–4.5)
PLAT MORPH BLD: ABNORMAL
PLATELET # BLD AUTO: 160 10E3/UL (ref 150–450)
POTASSIUM SERPL-SCNC: 4.4 MMOL/L (ref 3.4–5.3)
PROT SERPL-MCNC: 7.1 G/DL (ref 6.4–8.3)
RADIOLOGIST FLAGS: ABNORMAL
RBC # BLD AUTO: 3.57 10E6/UL (ref 3.8–5.2)
RBC MORPH BLD: ABNORMAL
RVPLETH-%PRED-PRE: 140 %
RVPLETH-PRE: 2.37 L
RVPLETH-PRED: 1.68 L
SODIUM SERPL-SCNC: 140 MMOL/L (ref 136–145)
TLCPLETH-%PRED-PRE: 76 %
TLCPLETH-PRE: 3.75 L
TLCPLETH-PRED: 4.92 L
TRIGL SERPL-MCNC: 187 MG/DL
VA-%PRED-PRE: 57 %
VA-PRE: 2.6 L
VC-%PRED-PRE: 42 %
VC-PRE: 1.38 L
VC-PRED: 3.26 L
WBC # BLD AUTO: 4.2 10E3/UL (ref 4–11)

## 2023-07-11 PROCEDURE — 94729 DIFFUSING CAPACITY: CPT | Performed by: PHYSICIAN ASSISTANT

## 2023-07-11 PROCEDURE — 85007 BL SMEAR W/DIFF WBC COUNT: CPT | Performed by: PATHOLOGY

## 2023-07-11 PROCEDURE — 82306 VITAMIN D 25 HYDROXY: CPT | Performed by: PHYSICIAN ASSISTANT

## 2023-07-11 PROCEDURE — 36415 COLL VENOUS BLD VENIPUNCTURE: CPT | Performed by: PATHOLOGY

## 2023-07-11 PROCEDURE — 99214 OFFICE O/P EST MOD 30 MIN: CPT | Mod: 25 | Performed by: PHYSICIAN ASSISTANT

## 2023-07-11 PROCEDURE — 86832 HLA CLASS I HIGH DEFIN QUAL: CPT | Performed by: PHYSICIAN ASSISTANT

## 2023-07-11 PROCEDURE — 83036 HEMOGLOBIN GLYCOSYLATED A1C: CPT | Performed by: PHYSICIAN ASSISTANT

## 2023-07-11 PROCEDURE — 85610 PROTHROMBIN TIME: CPT | Performed by: PATHOLOGY

## 2023-07-11 PROCEDURE — 94726 PLETHYSMOGRAPHY LUNG VOLUMES: CPT | Performed by: PHYSICIAN ASSISTANT

## 2023-07-11 PROCEDURE — 80061 LIPID PANEL: CPT | Performed by: PATHOLOGY

## 2023-07-11 PROCEDURE — 94375 RESPIRATORY FLOW VOLUME LOOP: CPT | Performed by: PHYSICIAN ASSISTANT

## 2023-07-11 PROCEDURE — 99207 PR NO CHARGE LOS: CPT

## 2023-07-11 PROCEDURE — 71046 X-RAY EXAM CHEST 2 VIEWS: CPT | Mod: GC | Performed by: RADIOLOGY

## 2023-07-11 PROCEDURE — 84100 ASSAY OF PHOSPHORUS: CPT | Performed by: PATHOLOGY

## 2023-07-11 PROCEDURE — 85027 COMPLETE CBC AUTOMATED: CPT | Performed by: PATHOLOGY

## 2023-07-11 PROCEDURE — 80053 COMPREHEN METABOLIC PANEL: CPT | Performed by: PATHOLOGY

## 2023-07-11 PROCEDURE — 99000 SPECIMEN HANDLING OFFICE-LAB: CPT | Performed by: PATHOLOGY

## 2023-07-11 PROCEDURE — G0463 HOSPITAL OUTPT CLINIC VISIT: HCPCS | Performed by: PHYSICIAN ASSISTANT

## 2023-07-11 PROCEDURE — 83735 ASSAY OF MAGNESIUM: CPT | Performed by: PATHOLOGY

## 2023-07-11 PROCEDURE — 86833 HLA CLASS II HIGH DEFIN QUAL: CPT | Performed by: PHYSICIAN ASSISTANT

## 2023-07-11 ASSESSMENT — PAIN SCALES - GENERAL: PAINLEVEL: NO PAIN (0)

## 2023-07-11 NOTE — PROGRESS NOTES
Transplant Coordinator Note     Reason for visit: Post lung transplant follow up visit   Coordinator: Present   Caregiver: None    Health concerns addressed today:  1. Respiratory: no concerns.   2. PEG tube coiled in stomach, will schedule. Using 1 carton per day, only tolerates 20 ml/hour (supposed to get 4 cartons/day). Eating more food PO. Has lost 17# in the last few months. Will touch base with RD. Having diarrhea despite taking Imodium TID.   3. Doing PT for her legs, feels like her legs aren't caught up in terms of strength as the rest of her body.      Activity/rehab: Up ad magalie. PT for legs every other week.   Oxygen needs: Only using oxygen at night, 2L  Pain management/RX: tylenol and oxycodone as needed, incisional pain from time to time.   Diabetic management: managed by PCP; checking twice per day  Next Bronch due: after next appt in July  Risk Criteria Labs: negative 7/28/22  CMV status: D+/R+  Valcyte stopped: POD 8-90  EBV status: D+/R+  AC/asa: ASA discontinued after recent hospitalization   PJP prophylactic: Dapsone   Diet: Ad magalie. TF at 30 ml. 24 hours/day.      COVID:  1. COVID-19 infection (yes/no, date of most recent positive test): no  2. Status/instructions given about COVID-19 vaccine: yes     Pt Education: medications (use/dose/side effects), how/when to call coordinator, frequency of labs, s/s of infection/rejection, call prior to starting any new medications, lab/vital sign book     Health Maintenance:     Last colonoscopy:     Next colonoscopy due:     Dermatology: done in May    Vaccinations this visit: none     Labs, CXR, PFTs reviewed with patient  Medication record reviewed and reconciled  Questions and concerns addressed     Patient Instructions  1. Continue to hydrate with 60-70 oz fluids daily.  2. Continue to exercise daily or most days of the week.  3. Follow up with your primary care provider for annual gender health maintenance procedures.  4. Follow up with colonoscopy  schedule.  5. Follow up with annual dermatology visits.  6. It doesn't seem like the COVID vaccine is working well in lung transplant patients. A number of lung transplant patients have gotten sick with COVID even after receiving the vaccines. Based on our recent experience, it can be life-threatening to get COVID  even after being vaccinated. Please continue to act like you did not get the COVID vaccine - social distancing, wearing a mask, good hand hygiene, etc. If the people around you are vaccinated, it will help reduce the risk of you getting COVID. All members of your household should be vaccinated.  7. We'll do a fasting cholesterol panel with your next labs. Nothing to eat or drink including tube feeding 8 hours before the draw.   8. We will message Kera dietmichelle to call you.   9. We are trying to get your feeding tube repositioned tomorrow. Don't use your tube feeding until then. Just take your tacrolimus and prednisone tonight.   10. Please call us anytime you have worsening GI symptoms or you continue to lose weight.     Next transplant clinic appointment: 2 weeks with lab, xray, and PFTs.  Next lab draw: Friday including fasting cholesterol and tacrolimus level     You are scheduled for a bronchoscopy on 7/12/23 at 9am at the [facility] on the 3rd floor. Arrive 1 hour early.     Instructions    1. Nothing to eat or drink (including tube feeding) 8 hours before procedure.   2. Hold your morning medications. Bring them with you to take after the procedure.  3. Have a  available to take you home.     AVS printed at time of check out

## 2023-07-11 NOTE — PATIENT INSTRUCTIONS
Patient Instructions  1. Continue to hydrate with 60-70 oz fluids daily.  2. Continue to exercise daily or most days of the week.  3. Follow up with your primary care provider for annual gender health maintenance procedures.  4. Follow up with colonoscopy schedule.  5. Follow up with annual dermatology visits.  6. It doesn't seem like the COVID vaccine is working well in lung transplant patients. A number of lung transplant patients have gotten sick with COVID even after receiving the vaccines. Based on our recent experience, it can be life-threatening to get COVID  even after being vaccinated. Please continue to act like you did not get the COVID vaccine - social distancing, wearing a mask, good hand hygiene, etc. If the people around you are vaccinated, it will help reduce the risk of you getting COVID. All members of your household should be vaccinated.  7. We'll do a fasting cholesterol panel with your next labs. Nothing to eat or drink including tube feeding 8 hours before the draw.   8. We will message Kera dietician to call you.   9. We are trying to get your feeding tube repositioned tomorrow. Don't use your tube feeding until then. Just take your tacrolimus and prednisone tonight.   10. Please call us anytime you have worsening GI symptoms or you continue to lose weight.     Next transplant clinic appointment: 2 weeks with lab, xray, and PFTs.  Next lab draw: Friday including fasting cholesterol and tacrolimus level     You are scheduled for a bronchoscopy on 7/12/23 at 9am at the [facility] on the 3rd floor. Arrive 1 hour early.     Instructions    1. Nothing to eat or drink (including tube feeding) 8 hours before procedure.   2. Hold your morning medications. Bring them with you to take after the procedure.  3. Have a  available to take you home.

## 2023-07-11 NOTE — LETTER
7/11/2023         RE: Sofie Rodriguez  [address] [place] [address]        Dear Colleague,    Thank you for referring your patient, Sofie Rodriguez, to the [facility] [place]. Please see a copy of my visit note below.    [facility]  July 11, 2023         Assessment and Plan:   Sofie Rodriguez is a 63 year old female with h/o COPD s/p BSLT 6/28/22 with course complicated by post-operative hemidiaphragm palsy, persistent pleural effusions, recurrent PNAs, positive DSA, EBV viremia, hypogammaglobulinemia, severe gastroparesis s/p G/J tube placement 7/27/22 with pyloric botox 1/25/23, GI bleed 2/2 pyloric ulcer, hemobilia s/p ERCP and MRCP, chronic diarrhea, recurrent C diff colitis, and ESRD on HD T/Th/Sat.  PMH also notable for HFpEF, ASCVD, NTM colonization, hep C, and methamphetamine use. Admitted 5/17-5/23 for intolerance of tube feedings with weight loss, neutropenia, and failure to thrive. She is seen today for routine follow up and at this time, it is noted that her feeding tube is coiled in her stomach, she has had worsening intolerance to TF and she has had a 17 lb weight loss since last visit. Tube will be repositioned tomorrow with plan for admission in one week if TF tolerance and weight not improving.     1. S/p bilateral lung transplant:   Right hemidiaphragm palsy:   Nocturnal hypoxia: no new pulmonary complaints, has started PT back up for leg weakness. On RA during the day, using 2L O2 at night. Sating 97% on room air. CMV 6/7 negative. ImmuKnow assay 73 on 5/18, indicative of increased risk of infection, down to 43 on recheck. CXR reviewed today and demonstrates stable changes of transplant. PFTs are low, have declined 110 ml since December. Has surveillance bronch/BAL and biopsies tomorrow.   - 2L NC overnight--has sleep study on July 17  - Continue IS including tacrolimus  (goal 8-12) and prednisone  - AZA on hold--given low ImmunKnow, would continue to hold for now  - Dapsone qMWF for PJP ppx  - If PFTs remain low, will order SNIFF test     2. Positive DSA: no prior treatment for AMR but has been a concern since pt. initially demonstrated rising DSA post-transplant. Ongoing positive DQB2 (MFI of 7847, down from 9100) on 6/7.   - DSA pending    3. Hypogammaglobulinemia: h/o prior IVIG infusions but most recent IgG adequate at 959 on 5/18.      4. EBV viremia: last level of 49K (log 4.7) on 6/23.   - Recheck with next visit     5. Severe malnutrition of chronic illness:  Gastroparesis s/p PEG/J and botox:   Pyloric ulcer:  Acute on chronic diarrhea:  Recurrent C diff colitis: chronic diarrhea since transplant with recurrent episodes of C diff, most recently diagnosed on 5/9 and started on PO vancomycin. Stool urgency and frequency persisting with tube feedings but decreased by about half when off cycled feeds. No improvement after formula change as OP on 4/18. Very little PO intake otherwise, reporting similar symptoms in addition to early satiety. GI consulted 5/18, see their notes for details, consistent with osmotic diarrhea. PO vancomycin stopped given no improvement with medication/completed treatment (?colonization). Calprotectin feces elevated at 210 on 5/18. Osmolality stool 328 on 5/19. Transitioned to continuous TF, able to tolerate 30 ml/hr although noted nausea and abdominal cramping with increase to 40 ml/hr, which is her goal. As OP able to get between  20-30 ml/hour, however, has not been able to do more than 1 carton a day for the last few weeks (goal 4 cartons per day). Runs TF typically after her morning medications, very minimal po intake. Still having 6-7 liquid stools at night, even with Imodium 3 times/day. Per review of chart, patient has lot 17 lbs since her last visit. On imaging today, tube is coiled in the stomach.  - Repositioning of tube tomorrow  - Will  have Kera DAS call patient asap with TF plan; if not improving with tolerance and weight in one week, will admit to hospital  - Imodium 2 mg up to 4 times/day PRN  - Scheduled to see Dr. Navarro later this summer to discuss possible G-POEM    6. CKD:   HTN: BP overall controlled. Doing dialysis T/Th/Sat.    Repeat fasting lipid panel    RTC: phone check in one week, visit in two weeks  Vaccinations: recommend repeat bivalent covid vaccine  Annual dermatology visit: following Derm   Preventative: DEXA > June 2023; colonoscopy completed March 2023 (diverticulosis in the sigmoid colon and colonic polyp noted s/p polypectomy)--needs repeat in 2026 per notes, mammogram completed this past winter and was negative    Kaylin Triana PA-C  Pulmonary, Allergy, Critical Care and Sleep Medicine        Interval History:     Trying to walk around the block twice/day. Also has started therapy for her legs, PT one/day every two weeks and she has exercises to do at home. No fever or chills, no cough or new shortness of breath. Trying to use her feeding tube, can only do 20-30 ml/hr, only using 1 carton per day (is supposed to be on 4 cartons per day). Feels her tube feed tolerance has decreased over the last few weeks, was at 2-3 cartons after discharge. Notes she has been eating more, but her weight is significantly down. Biggest issue is nausea with both eating and TF, did throw up X 1, mostly foam. Stools are mainly diarrhea, 6-7 liquid stools at night.           Review of Systems:   Please see HPI, otherwise the complete 10 point ROS is negative.           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    Hepatitis 2017    Hep C, [facility]    HTN (hypertension)     Lung infection 11/30/2022    Osteopenia      Past Surgical History:    Procedure Laterality Date    BRONCHOSCOPY (RIGID OR FLEXIBLE), DIAGNOSTIC N/A 8/2/2022    Procedure: BRONCHOSCOPY, DIAGNOSTIC- inspection Bronch;  Surgeon: Kamala Lovell MD;  Location:  GI    BRONCHOSCOPY (RIGID OR FLEXIBLE), DIAGNOSTIC N/A 9/13/2022    Procedure: INSPECTION BRONCHOSCOPY, WITH BRONCHOALVEOLAR LAVAGE;  Surgeon: Jose R Mccullough MD;  Location:  GI    BRONCHOSCOPY (RIGID OR FLEXIBLE), DIAGNOSTIC N/A 11/9/2022    Procedure: BRONCHOSCOPY, WITH BRONCHOALVEOLAR LAVAGE AND BIOPSY;  Surgeon: Cesar Lima MD;  Location:  GI    BRONCHOSCOPY (RIGID OR FLEXIBLE), DIAGNOSTIC N/A 1/25/2023    Procedure: BRONCHOSCOPY, WITH BRONCHOALVEOLAR LAVAGE AND BIOPSY;  Surgeon: Mason Reddy MD;  Location:  GI    BRONCHOSCOPY (RIGID OR FLEXIBLE), DIAGNOSTIC N/A 4/19/2023    Procedure: BRONCHOSCOPY, WITH BRONCHOALVEOLAR LAVAGE AND BIOPSY;  Surgeon: Kamala Lovell MD;  Location:  GI    BRONCHOSCOPY FLEXIBLE AND RIGID N/A 07/19/2022    Procedure: BRONCHOSCOPY inspection only;  Surgeon: Bob Liao MD;  Location:  GI    COLONOSCOPY  2015    CV CORONARY ANGIOGRAM N/A 06/30/2021    Procedure: CV CORONARY ANGIOGRAM;  Surgeon: Alexander Cuellar MD;  Location:  HEART CARDIAC CATH LAB    CV RIGHT HEART CATH MEASUREMENTS RECORDED N/A 06/30/2021    Procedure: CV RIGHT HEART CATH;  Surgeon: Alexander Cuellar MD;  Location:  HEART CARDIAC CATH LAB    ENDOSCOPIC RETROGRADE CHOLANGIOPANCREATOGRAM N/A 8/11/2022    Procedure: ENDOSCOPIC RETROGRADE CHOLANGIOPANCREATOGRAPHY WITH PANCREATIC DUCT NEEDLE KNIFE AND STENT PLACEMENT, BILE DUCT SPHINCTEROTOMY, BLOOD/DEBRIS REMOVAL AND STENT PLACEMENT;  Surgeon: Cosmo Arroyo MD;  Location:  OR    ENDOSCOPIC RETROGRADE CHOLANGIOPANCREATOGRAM N/A 10/7/2022    Procedure: ENDOSCOPIC RETROGRADE CHOLANGIOPANCREATOGRAPHY with biliary and pancreatic stent removal, debris removal;  Surgeon: Cosmo Arroyo MD;  Location:  OR    ENT SURGERY  1974     tonsillectomy    ENTEROSCOPY SMALL BOWEL N/A 8/11/2022    Procedure: SMALL BOWEL ENTEROSCOPY;  Surgeon: Cosmo Arroyo MD;  Location: [facility] OR    ESOPHAGOGASTRODUODENOSCOPY, WITH NASOGASTRIC TUBE INSERTION N/A 07/01/2022    Procedure: ESOPHAGOGASTRODUODENOSCOPY, WITH NASOJEJUNAL TUBE INSERTION;  Surgeon: Ozzy Nickerson MD;  Location: [facility] GI    ESOPHAGOSCOPY, GASTROSCOPY, DUODENOSCOPY (EGD), COMBINED N/A 8/3/2022    Procedure: ESOPHAGOGASTRODUODENOSCOPY (EGD);  Surgeon: Ira Andres MD;  Location: [facility] GI    ESOPHAGOSCOPY, GASTROSCOPY, DUODENOSCOPY (EGD), COMBINED N/A 1/25/2023    Procedure: ESOPHAGOGASTRODUODENOSCOPY (EGD) with botox injection;  Surgeon: Gonzalez Navarro MD;  Location: [facility] GI    HAND SURGERY      INSERT CHEST TUBE Right 9/13/2022    Procedure: Insert chest tube;  Surgeon: Jose R Mccullough MD;  Location: [facility] GI    IR CVC TUNNEL PLACEMENT > 5 YRS OF AGE  9/26/2022    IR GASTRO JEJUNOSTOMY TUBE CHANGE  8/31/2022    IR GASTRO JEJUNOSTOMY TUBE CHANGE  12/21/2022    IR GASTRO JEJUNOSTOMY TUBE PLACEMENT  7/27/2022    IR THORACENTESIS  8/29/2022    LEEP TX, CERVICAL  04/07/2017    HECTOR III    LYMPH NODE BIOPSY Left 2005    Left axilla, benign- [place]    MIDLINE INSERTION - DOUBLE LUMEN Left 07/28/2022    20cm, Basilic vein    REPLACE GASTROJEJUNOSTOMY TUBE, PERCUTANEOUS  10/7/2022    Procedure: Replace Gastrojejunostomy Tube;  Surgeon: Cosmo Arroyo MD;  Location: [facility]    THORACENTESIS Left 8/29/2022    Procedure: THORACENTESIS;  Surgeon: Bo Capone PA-C;  Location: [facility] OR    THORACENTESIS Left 9/13/2022    Procedure: Thoracentesis;  Surgeon: Jose R Mccullough MD;  Location: [facility]    THROMBECTOMY UPPER EXTREMITY Left 07/02/2022    Procedure: LEFT RADIAL ARM THROMBECTOMY;  Surgeon: Christie Graham MD;  Location: [facility]    TRANSPLANT LUNG RECIPIENT SINGLE X2 Bilateral 06/28/2022    Procedure: Clamshell Incision, Bilateral Sequential Lung Transplant, On  Cardiopulmonary Bypass, Flexible Bronchoscopy;  Surgeon: Sue Sunshine MD;  Location:  OR           Family History:     No family history on file.         Social History:     Social History     Socioeconomic History    Marital status: Single     Spouse name: Not on file    Number of children: Not on file    Years of education: Not on file    Highest education level: Not on file   Occupational History    Not on file   Tobacco Use    Smoking status: Former     Years: 30.00     Types: Cigarettes     Quit date: 2020     Years since quittin.6    Smokeless tobacco: Never   Substance and Sexual Activity    Alcohol use: Not Currently    Drug use: Not Currently     Types: Marijuana, Methamphetamines     Comment: hx:marijuana and methamphetamine-quit both unsure ?  2-3 yrs ago    Sexual activity: Not on file   Other Topics Concern    Parent/sibling w/ CABG, MI or angioplasty before 65F 55M? Not Asked   Social History Narrative    Not on file     Social Determinants of Health     Financial Resource Strain: Not on file   Food Insecurity: Not on file   Transportation Needs: Not on file   Physical Activity: Not on file   Stress: Not on file   Social Connections: Not on file   Intimate Partner Violence: Not on file   Housing Stability: Not on file            Medications:     Current Outpatient Medications   Medication    alcohol swab prep pads    azaTHIOprine (IMURAN) 5 mg/mL SUSP    blood glucose (CONTOUR NEXT TEST) test strip    blood glucose (NO BRAND SPECIFIED) lancets standard    blood glucose monitoring (CONTOUR NEXT MONITOR W/DEVICE KIT) meter device kit    Calcium Carbonate-Vitamin D 600-10 MG-MCG TABS    cyanocobalamin (CYANOCOBALAMIN) 500 MCG tablet    dapsone 2 mg/mL SUSP    insulin pen needle (32G X 4 MM) 32G X 4 MM miscellaneous    loperamide (IMODIUM A-D) 2 MG tablet    metoprolol tartrate (LOPRESSOR) 50 MG tablet    Nutritional Supplements (GLUCOSE MANAGEMENT) TABS    pantoprazole (PROTONIX) 2 mg/mL  SUSP suspension    predniSONE (DELTASONE) 5 MG tablet    protein modular (PROSOURCE TF) LIQD    Sharps Container MISC    tacrolimus (GENERIC EQUIVALENT) 1 mg/mL suspension     No current facility-administered medications for this visit.     Facility-Administered Medications Ordered in Other Visits   Medication    lidocaine (XYLOCAINE) 2 % external gel            Physical Exam:   /76 (BP Location: Right arm, Patient Position: Chair, Cuff Size: Adult Regular)   Pulse 87   SpO2 100%     GENERAL: alert, NAD  HEENT: NCAT, EOMI, no scleral icterus, oral mucosa moist and without lesions  Neck: no cervical or supraclavicular adenopathy  Lungs: moderate air flow, mainly clear  CV: RRR, S1S2, no murmurs noted  Abdomen: normoactive BS, soft, non tender  Lymph: no edema  Neuro: AAO X 3, CN 2-12 grossly intact  Psychiatric: normal affect, good eye contact  Skin: no rash, jaundice or lesions on limited exam         Data:   All laboratory and imaging data reviewed.      Recent Results (from the past 168 hour(s))   6 minute walk test    Collection Time: 07/11/23 12:00 AM   Result Value Ref Range    6 min walk (FT) 1,450 1,423 ft    6 Min Walk (M) 442 434 m   Magnesium    Collection Time: 07/11/23 12:17 PM   Result Value Ref Range    Magnesium 2.0 1.7 - 2.3 mg/dL   Phosphorus    Collection Time: 07/11/23 12:17 PM   Result Value Ref Range    Phosphorus 2.1 (L) 2.5 - 4.5 mg/dL   Comprehensive metabolic panel    Collection Time: 07/11/23 12:17 PM   Result Value Ref Range    Sodium 140 136 - 145 mmol/L    Potassium 4.4 3.4 - 5.3 mmol/L    Chloride 101 98 - 107 mmol/L    Carbon Dioxide (CO2) 29 22 - 29 mmol/L    Anion Gap 10 7 - 15 mmol/L    Urea Nitrogen 10.2 8.0 - 23.0 mg/dL    Creatinine 3.40 (H) 0.51 - 0.95 mg/dL    Calcium 9.4 8.8 - 10.2 mg/dL    Glucose 104 (H) 70 - 99 mg/dL    Alkaline Phosphatase 65 35 - 104 U/L    AST 23 0 - 45 U/L    ALT 7 0 - 50 U/L    Protein Total 7.1 6.4 - 8.3 g/dL    Albumin 4.2 3.5 - 5.2 g/dL     Bilirubin Total 0.6 <=1.2 mg/dL    GFR Estimate 15 (L) >60 mL/min/1.73m2   Hemoglobin A1c    Collection Time: 07/11/23 12:17 PM   Result Value Ref Range    Hemoglobin A1C <4.2 <5.7 %   Lipid Profile    Collection Time: 07/11/23 12:17 PM   Result Value Ref Range    Cholesterol 268 (H) <200 mg/dL    Triglycerides 187 (H) <150 mg/dL    Direct Measure HDL 91 >=50 mg/dL    LDL Cholesterol Calculated 140 (H) <=100 mg/dL    Non HDL Cholesterol 177 (H) <130 mg/dL   INR    Collection Time: 07/11/23 12:17 PM   Result Value Ref Range    INR 0.92 0.85 - 1.15   CBC with platelets and differential    Collection Time: 07/11/23 12:17 PM   Result Value Ref Range    WBC Count 4.2 4.0 - 11.0 10e3/uL    RBC Count 3.57 (L) 3.80 - 5.20 10e6/uL    Hemoglobin 12.8 11.7 - 15.7 g/dL    Hematocrit 40.4 35.0 - 47.0 %     (H) 78 - 100 fL    MCH 35.9 (H) 26.5 - 33.0 pg    MCHC 31.7 31.5 - 36.5 g/dL    RDW 15.8 (H) 10.0 - 15.0 %    Platelet Count 160 150 - 450 10e3/uL   Manual Differential    Collection Time: 07/11/23 12:17 PM   Result Value Ref Range    % Neutrophils 72 %    % Lymphocytes 15 %    % Monocytes 9 %    % Eosinophils 1 %    % Basophils 0 %    % Metamyelocytes 3 %    Absolute Neutrophils 3.0 1.6 - 8.3 10e3/uL    Absolute Lymphocytes 0.6 (L) 0.8 - 5.3 10e3/uL    Absolute Monocytes 0.4 0.0 - 1.3 10e3/uL    Absolute Eosinophils 0.0 0.0 - 0.7 10e3/uL    Absolute Basophils 0.0 0.0 - 0.2 10e3/uL    Absolute Metamyelocytes 0.1 (H) <=0.0 10e3/uL    RBC Morphology Confirmed RBC Indices     Platelet Assessment  Automated Count Confirmed. Platelet morphology is normal.     Automated Count Confirmed. Platelet morphology is normal.   X-ray Chest 2 vws*    Collection Time: 07/11/23 12:29 PM   Result Value Ref Range    Radiologist flags (Urgent)      Percutaneous gastrojejunostomy tube retracted into the   General PFT Lab (Please always keep checked)    Collection Time: 07/11/23 12:34 PM   Result Value Ref Range    FVC-Pred 2.78 L    FVC-Pre  1.44 L    FVC-%Pred-Pre 51 %    FEV1-Pre 1.43 L    FEV1-%Pred-Pre 64 %    FEV1FVC-Pred 80 %    FEV1FVC-Pre 99 %    FEFMax-Pred 6.09 L/sec    FEFMax-Pre 4.85 L/sec    FEFMax-%Pred-Pre 79 %    FEF2575-Pred 2.07 L/sec    FEF2575-Pre 4.18 L/sec    YYA4160-%Pred-Pre 201 %    ExpTime-Pre 6.21 sec    FIFMax-Pre 3.55 L/sec    VC-Pred 3.26 L    VC-Pre 1.38 L    VC-%Pred-Pre 42 %    IC-Pred 2.37 L    IC-Pre 1.12 L    IC-%Pred-Pre 47 %    ERV-Pred 0.79 L    ERV-Pre 0.26 L    ERV-%Pred-Pre 33 %    FEV1FEV6-Pred 81 %    FEV1FEV6-Pre 99 %    FRCPleth-Pred 2.61 L    FRCPleth-Pre 2.63 L    FRCPleth-%Pred-Pre 100 %    RVPleth-Pred 1.68 L    RVPleth-Pre 2.37 L    RVPleth-%Pred-Pre 140 %    TLCPleth-Pred 4.92 L    TLCPleth-Pre 3.75 L    TLCPleth-%Pred-Pre 76 %    DLCOunc-Pred 19.18 ml/min/mmHg    DLCOunc-Pre 14.88 ml/min/mmHg    DLCOunc-%Pred-Pre 77 %    VA-Pre 2.60 L    VA-%Pred-Pre 57 %    FEV1SVC-Pred 68 %    FEV1SVC-Pre 104 %   Gram Stain    Collection Time: 07/12/23  9:22 AM    Specimen: Bronchus; Bronchial Alveolar Lavage   Result Value Ref Range    Gram Stain Result <25 PMNs/low power field     Gram Stain Result No organisms seen    Cell Count Body Fluid    Collection Time: 07/12/23  9:23 AM   Result Value Ref Range    Color Colorless Colorless, Yellow    Clarity Hazy (A) Clear    Cell Count Fluid Source Lung, Middle Lobe, Right     Total Nucleated Cells 93 /uL   Differential Body Fluid    Collection Time: 07/12/23  9:23 AM   Result Value Ref Range    % Neutrophils 1 %    % Lymphocytes 7 %    % Monocyte/Macrophages 92 %     PFT interpretation:  Maneuver: valid, but ATS not met    6 MWT on RA > LLN with significant desaturation, but not significant hypoxia    Transplant Coordinator Note     Reason for visit: Post lung transplant follow up visit   Coordinator: Present   Caregiver: None    Health concerns addressed today:  1. Respiratory: no concerns.   2. PEG tube coiled in stomach, will schedule. Using 1 carton per day, only  tolerates 20 ml/hour (supposed to get 4 cartons/day). Eating more food PO. Has lost 17# in the last few months. Will touch base with RD. Having diarrhea despite taking Imodium TID.   3. Doing PT for her legs, feels like her legs aren't caught up in terms of strength as the rest of her body.      Activity/rehab: Up ad magalie. PT for legs every other week.   Oxygen needs: Only using oxygen at night, 2L  Pain management/RX: tylenol and oxycodone as needed, incisional pain from time to time.   Diabetic management: managed by PCP; checking twice per day  Next Bronch due: after next appt in July  Risk Criteria Labs: negative 7/28/22  CMV status: D+/R+  Valcyte stopped: POD 8-90  EBV status: D+/R+  AC/asa: ASA discontinued after recent hospitalization   PJP prophylactic: Dapsone   Diet: Ad magalie. TF at 30 ml. 24 hours/day.      COVID:  COVID-19 infection (yes/no, date of most recent positive test): no  Status/instructions given about COVID-19 vaccine: yes     Pt Education: medications (use/dose/side effects), how/when to call coordinator, frequency of labs, s/s of infection/rejection, call prior to starting any new medications, lab/vital sign book     Health Maintenance:   Last colonoscopy:   Next colonoscopy due:   Dermatology: done in May  Vaccinations this visit: none     Labs, CXR, PFTs reviewed with patient  Medication record reviewed and reconciled  Questions and concerns addressed     Patient Instructions  1. Continue to hydrate with 60-70 oz fluids daily.  2. Continue to exercise daily or most days of the week.  3. Follow up with your primary care provider for annual gender health maintenance procedures.  4. Follow up with colonoscopy schedule.  5. Follow up with annual dermatology visits.  6. It doesn't seem like the COVID vaccine is working well in lung transplant patients. A number of lung transplant patients have gotten sick with COVID even after receiving the vaccines. Based on our recent experience, it can be  life-threatening to get COVID  even after being vaccinated. Please continue to act like you did not get the COVID vaccine - social distancing, wearing a mask, good hand hygiene, etc. If the people around you are vaccinated, it will help reduce the risk of you getting COVID. All members of your household should be vaccinated.  7. We'll do a fasting cholesterol panel with your next labs. Nothing to eat or drink including tube feeding 8 hours before the draw.   8. We will message [facility] dietician to call you.   9. We are trying to get your feeding tube repositioned tomorrow. Don't use your tube feeding until then. Just take your tacrolimus and prednisone tonight.   10. Please call us anytime you have worsening GI symptoms or you continue to lose weight.     Next transplant clinic appointment: 2 weeks with lab, xray, and PFTs.  Next lab draw: Friday including fasting cholesterol and tacrolimus level     You are scheduled for a bronchoscopy on 7/12/23 at 9am at the [facility] on the 3rd floor. Arrive 1 hour early.     Instructions    1. Nothing to eat or drink (including tube feeding) 8 hours before procedure.   2. Hold your morning medications. Bring them with you to take after the procedure.  3. Have a  available to take you home.     AVS printed at time of check out      Again, thank you for allowing me to participate in the care of your patient.        Sincerely,        Kaylin Triana PA-C

## 2023-07-11 NOTE — LETTER
PHYSICIAN ORDERS      DATE & TIME ISSUED: 2023 3:51 PM  PATIENT NAME: Sofie Rodriguez   : 1960     [facility] MR# [if applicable]: [id number]     DIAGNOSIS:  Lung Transplant  Z94.2    Please check fasting lipid panel with her labs on 23.     Any questions please call: Lacey     Please fax these results to [phone].        Kaylin Triana PA-C

## 2023-07-11 NOTE — NURSING NOTE
Chief Complaint   Patient presents with     Lung Transplant     Lung TXP      Vitals were taken and medications were reconciled.     Jeanne Ordonez RMA  2:11 PM

## 2023-07-11 NOTE — NURSING NOTE
7/12/23 bronchoscopy with lavage and transbronchial biopsies.  Reason: surveillance     Date of transplant 6/28/23   Transplant type lung  Date of labs 7/11/23  BUN 10.2 DDAVP no  Plt 160  INR 0.92 Anticoag therapy none Last dose n/a  Comment

## 2023-07-12 ENCOUNTER — HOSPITAL ENCOUNTER (OUTPATIENT)
Facility: CLINIC | Age: 63
Discharge: HOME | End: 2023-07-12
Attending: INTERNAL MEDICINE | Admitting: RADIOLOGY
Payer: MEDICARE

## 2023-07-12 ENCOUNTER — APPOINTMENT (OUTPATIENT)
Dept: GENERAL RADIOLOGY | Facility: CLINIC | Age: 63
End: 2023-07-12
Attending: INTERNAL MEDICINE
Payer: MEDICARE

## 2023-07-12 ENCOUNTER — ANCILLARY PROCEDURE (OUTPATIENT)
Dept: INTERVENTIONAL RADIOLOGY/VASCULAR | Facility: CLINIC | Age: 63
End: 2023-07-12
Attending: PHYSICIAN ASSISTANT
Payer: MEDICARE

## 2023-07-12 VITALS
HEART RATE: 91 BPM | RESPIRATION RATE: 16 BRPM | DIASTOLIC BLOOD PRESSURE: 86 MMHG | OXYGEN SATURATION: 97 % | SYSTOLIC BLOOD PRESSURE: 133 MMHG

## 2023-07-12 DIAGNOSIS — K31.84 GASTROPARESIS: Primary | ICD-10-CM

## 2023-07-12 DIAGNOSIS — K31.84 GASTROPARESIS: ICD-10-CM

## 2023-07-12 DIAGNOSIS — Z94.2 LUNG REPLACED BY TRANSPLANT (H): ICD-10-CM

## 2023-07-12 LAB
APPEARANCE FLD: ABNORMAL
C PNEUM DNA SPEC QL NAA+PROBE: ABNORMAL
CELL COUNT BODY FLUID SOURCE: ABNORMAL
COLOR FLD: COLORLESS
DEPRECATED CALCIDIOL+CALCIFEROL SERPL-MC: 42 UG/L (ref 20–75)
DONOR IDENTIFICATION: NORMAL
DQB2: 6217
DSA COMMENTS: NORMAL
DSA PRESENT: YES
DSA TEST METHOD: NORMAL
FLUAV H1 2009 PAND RNA SPEC QL NAA+PROBE: ABNORMAL
FLUAV H1 RNA SPEC QL NAA+PROBE: ABNORMAL
FLUAV H3 RNA SPEC QL NAA+PROBE: ABNORMAL
FLUAV RNA SPEC QL NAA+PROBE: ABNORMAL
FLUBV RNA SPEC QL NAA+PROBE: ABNORMAL
GRAM STAIN RESULT: NORMAL
GRAM STAIN RESULT: NORMAL
HADV DNA SPEC QL NAA+PROBE: ABNORMAL
HCOV PNL SPEC NAA+PROBE: ABNORMAL
HMPV RNA SPEC QL NAA+PROBE: ABNORMAL
HPIV1 RNA SPEC QL NAA+PROBE: ABNORMAL
HPIV2 RNA SPEC QL NAA+PROBE: ABNORMAL
HPIV3 RNA SPEC QL NAA+PROBE: ABNORMAL
HPIV4 RNA SPEC QL NAA+PROBE: ABNORMAL
LYMPHOCYTES NFR FLD MANUAL: 7 %
M PNEUMO DNA SPEC QL NAA+PROBE: ABNORMAL
MONOS+MACROS NFR FLD MANUAL: 92 %
NEUTS BAND NFR FLD MANUAL: 1 %
ORGAN: NORMAL
PATH REPORT.COMMENTS IMP SPEC: NORMAL
PATH REPORT.FINAL DX SPEC: NORMAL
PATH REPORT.FINAL DX SPEC: NORMAL
PATH REPORT.GROSS SPEC: NORMAL
PATH REPORT.GROSS SPEC: NORMAL
PATH REPORT.MICROSCOPIC SPEC OTHER STN: NORMAL
PATH REPORT.RELEVANT HX SPEC: NORMAL
PATH REPORT.RELEVANT HX SPEC: NORMAL
PHOTO IMAGE: NORMAL
RSV RNA SPEC QL NAA+PROBE: ABNORMAL
RSV RNA SPEC QL NAA+PROBE: ABNORMAL
RV+EV RNA SPEC QL NAA+PROBE: ABNORMAL
SA 1 CELL: NORMAL
SA 1 TEST METHOD: NORMAL
SA 2 CELL: NORMAL
SA 2 TEST METHOD: NORMAL
SA1 HI RISK ABY: NORMAL
SA1 MOD RISK ABY: NORMAL
SA2 HI RISK ABY: NORMAL
SA2 MOD RISK ABY: NORMAL
UNACCEPTABLE ANTIGENS: NORMAL
UNOS CPRA: 37
WBC # FLD AUTO: 93 /UL
ZZZSA 1  COMMENTS: NORMAL
ZZZSA 2 COMMENTS: NORMAL

## 2023-07-12 PROCEDURE — 87102 FUNGUS ISOLATION CULTURE: CPT | Performed by: INTERNAL MEDICINE

## 2023-07-12 PROCEDURE — 88312 SPECIAL STAINS GROUP 1: CPT | Mod: 26 | Performed by: PATHOLOGY

## 2023-07-12 PROCEDURE — 71046 X-RAY EXAM CHEST 2 VIEWS: CPT | Mod: 26 | Performed by: RADIOLOGY

## 2023-07-12 PROCEDURE — 87102 FUNGUS ISOLATION CULTURE: CPT | Mod: 91 | Performed by: INTERNAL MEDICINE

## 2023-07-12 PROCEDURE — 89051 BODY FLUID CELL COUNT: CPT | Performed by: INTERNAL MEDICINE

## 2023-07-12 PROCEDURE — G0500 MOD SEDAT ENDO SERVICE >5YRS: HCPCS | Performed by: INTERNAL MEDICINE

## 2023-07-12 PROCEDURE — 31624 DX BRONCHOSCOPE/LAVAGE: CPT | Performed by: INTERNAL MEDICINE

## 2023-07-12 PROCEDURE — 88312 SPECIAL STAINS GROUP 1: CPT | Mod: TC | Performed by: INTERNAL MEDICINE

## 2023-07-12 PROCEDURE — 99152 MOD SED SAME PHYS/QHP 5/>YRS: CPT | Performed by: INTERNAL MEDICINE

## 2023-07-12 PROCEDURE — 88108 CYTOPATH CONCENTRATE TECH: CPT | Mod: 26 | Performed by: PATHOLOGY

## 2023-07-12 PROCEDURE — 88305 TISSUE EXAM BY PATHOLOGIST: CPT | Mod: TC | Performed by: INTERNAL MEDICINE

## 2023-07-12 PROCEDURE — 49452 REPLACE G-J TUBE PERC: CPT

## 2023-07-12 PROCEDURE — 31628 BRONCHOSCOPY/LUNG BX EACH: CPT | Performed by: INTERNAL MEDICINE

## 2023-07-12 PROCEDURE — 31632 BRONCHOSCOPY/LUNG BX ADDL: CPT | Performed by: INTERNAL MEDICINE

## 2023-07-12 PROCEDURE — 999N000065 XR CHEST 2 VIEWS

## 2023-07-12 PROCEDURE — 250N000011 HC RX IP 250 OP 636: Performed by: INTERNAL MEDICINE

## 2023-07-12 PROCEDURE — 87185 SC STD ENZYME DETCJ PER NZM: CPT | Performed by: INTERNAL MEDICINE

## 2023-07-12 PROCEDURE — 88305 TISSUE EXAM BY PATHOLOGIST: CPT | Mod: 26 | Performed by: PATHOLOGY

## 2023-07-12 PROCEDURE — 87205 SMEAR GRAM STAIN: CPT | Performed by: INTERNAL MEDICINE

## 2023-07-12 PROCEDURE — 99153 MOD SED SAME PHYS/QHP EA: CPT | Performed by: INTERNAL MEDICINE

## 2023-07-12 PROCEDURE — 250N000009 HC RX 250: Performed by: INTERNAL MEDICINE

## 2023-07-12 PROCEDURE — 87116 MYCOBACTERIA CULTURE: CPT | Performed by: INTERNAL MEDICINE

## 2023-07-12 RX ORDER — LIDOCAINE HYDROCHLORIDE 40 MG/ML
INJECTION, SOLUTION RETROBULBAR PRN
Status: DISCONTINUED | OUTPATIENT
Start: 2023-07-12 | End: 2023-07-12 | Stop reason: HOSPADM

## 2023-07-12 RX ORDER — LIDOCAINE HYDROCHLORIDE AND EPINEPHRINE 10; 10 MG/ML; UG/ML
INJECTION, SOLUTION INFILTRATION; PERINEURAL PRN
Status: DISCONTINUED | OUTPATIENT
Start: 2023-07-12 | End: 2023-07-12 | Stop reason: HOSPADM

## 2023-07-12 RX ORDER — LIDOCAINE HYDROCHLORIDE 10 MG/ML
INJECTION, SOLUTION INFILTRATION; PERINEURAL PRN
Status: DISCONTINUED | OUTPATIENT
Start: 2023-07-12 | End: 2023-07-12 | Stop reason: HOSPADM

## 2023-07-12 RX ORDER — MAGNESIUM HYDROXIDE 1200 MG/15ML
LIQUID ORAL PRN
Status: DISCONTINUED | OUTPATIENT
Start: 2023-07-12 | End: 2023-07-12 | Stop reason: HOSPADM

## 2023-07-12 RX ORDER — LIDOCAINE HYDROCHLORIDE 20 MG/ML
JELLY TOPICAL ONCE
Status: COMPLETED | OUTPATIENT
Start: 2023-07-12 | End: 2023-07-12

## 2023-07-12 RX ORDER — FENTANYL CITRATE 50 UG/ML
INJECTION, SOLUTION INTRAMUSCULAR; INTRAVENOUS PRN
Status: DISCONTINUED | OUTPATIENT
Start: 2023-07-12 | End: 2023-07-12 | Stop reason: HOSPADM

## 2023-07-12 RX ADMIN — LIDOCAINE HYDROCHLORIDE: 20 JELLY TOPICAL at 14:06

## 2023-07-12 ASSESSMENT — ACTIVITIES OF DAILY LIVING (ADL)
ADLS_ACUITY_SCORE: 35

## 2023-07-12 NOTE — PROGRESS NOTES
Interventional Radiology Brief Post Procedure Note    Procedure: Fluoroscopic-guided GJ exchange    Proceduralist: Mara Kelly PA-C    Assistant: Dr. Daigle    Time Out: Prior to the start of the procedure and with procedural staff participation, I verbally confirmed the patient s identity using two indicators, relevant allergies, that the procedure was appropriate and matched the consent or emergent situation, and that the correct equipment/implants were available. Immediately prior to starting the procedure I conducted the Time Out with the procedural staff and re-confirmed the patient s name, procedure, and site/side. (The Joint Commission universal protocol was followed.)  Yes    Medications   Medication Event Details Admin User Admin Time       Sedation: None. Local Anesthestic used    Findings: Successful exchange of 18 Fr, 45 cm gastrojejunostomy catheter.     Estimated Blood Loss: None    SPECIMENS: None    Complications: 1. None     Condition: Stable    Plan: Follow-up per primary care.     Comments: See dictated procedure note for full details.    Lynnette Kelly PA-C

## 2023-07-12 NOTE — DISCHARGE INSTRUCTIONS
[facility], [place]  Same-Day BRONCHOSCOPY Procedure (with or without biopsy and/or intervention)  Adult Discharge Orders & Instructions     You had a procedure known as an Bronchoscopy (Bronch). Your healthcare provider performed the Bronch to look directly into the airways of your trachea and/or lungs. This is done using a lighted camera called a bronchoscope. The bronchoscope allows your provider to see inside the tissue of the airways. Often biopsies, small samples of tissue, are taken to help diagnose and/or classify stages of disease growth. This procedure is used to diagnose diseases of the lungs and/or surrounding tissues.     After your procedure   Make sure to clarify with your healthcare provider any diet restrictions (For example, clear liquid, low fat, no caffeine, etc.)   Do NOT take aspirin containing medications or any other blood-thinning medicines (anticoagulants) until your healthcare provider says it's OK.   You MAY be prescribed antibiotics, depending on what was done and/or found during your EUS, make sure to take antibiotics as prescribed by your healthcare provider    For 24 hours after BRONCHOSCOPY     You may cough up a small amount of blood for a day or two  Get plenty of rest.  A responsible adult must stay with you for at least 24 hours after you leave the hospital.   Do not drive or use heavy equipment.  If you have weakness or tingling, don't drive or use heavy equipment until this feeling goes away.  Do not drink alcohol.  Avoid strenuous or risky activities (gym, yoga, cycling, etc.).  Ask for help when climbing stairs.   You may feel lightheaded.  IF so, sit for a few minutes before standing.  Have someone help you get up.   If you have nausea (feel sick to your stomach): Drink only clear liquids such as apple juice, ginger ale, broth or 7-Up.  Rest may also help.  Be sure to drink enough fluids.  Move to a regular diet as you feel able.  You may have a  slight fever. Call the doctor if your fever is over 100 F (37.7 C) (taken under the tongue) or lasts longer than 24 hours.  You may have a dry mouth, a sore throat, muscle aches or trouble sleeping.  These are normal and should go away after 24 hours.  Do not make important or legal decisions.     Call your doctor  for any of the following:    If you begin to cough up bright red blood, or large clots of blood   If you become increasing more short of breath or begin to have chest pains  Difficulty swallowing or feeling as though food or liquids are getting stuck   Sore throat lasting more than 2 days or pain that has worsened over time  Nausea and/or vomiting that is not resolving or has not responded to anti-nausea medications if prescribed to you   If you experience a fever above 100.5 F (38 C) for more than 24 hours.   It has been over 8 to 10 hours since surgery and you are still not able to urinate (pass water).      To contact a doctor, call:  [ ] [phone] and ask for the PULMONARY MEDICINE resident on call (answered 24 hours a day)  [ ] Emergency Department: [facility]: [phone]    Take it easy when you get home.  Remember, same day surgery DOES NOT MEAN SAME DAY RECOVERY!  Healing is a gradual process.  You will need some time to recover - you may be more tired than you realize at first.  Rest and relax for at least the first 24 hours at home.  You'll feel better and heal faster if you take good care of yourself.

## 2023-07-12 NOTE — OR NURSING
Pt underwent bronch with BAL and biopsies under conscious sedation. Specimens: sent to lab. Pt transferred to recovery and report given to monica RITTER.       Raiza Adams RN      
negative...

## 2023-07-13 ENCOUNTER — DOCUMENTATION ONLY (OUTPATIENT)
Dept: TRANSPLANT | Facility: CLINIC | Age: 63
End: 2023-07-13
Payer: MEDICARE

## 2023-07-13 LAB
CMV DNA IU/ML, INSTRUMENT: 1542 IU/ML
CMV DNA SPEC NAA+PROBE-LOG#: 3.2 {LOG_COPIES}/ML

## 2023-07-13 NOTE — PROGRESS NOTES
Received message from lung txp coordinator that FT was coiled in the stomach, possibly explaining why pt was unable to tolerate >20 ml/hr feed rate (with goal being 40).    Sent Sofie message:  Ben Carrizales,  I heard your feeding tube has been repositioned and maybe it being out of place was cause for some of your intolerance to going up higher on the hourly rate.    Have you restarted your feeds since you got home from the tube repositioning?  Last we talked (~1 month ago), you were on [employer] Renal formula, with ultimate goal of 40 ml/hr x 24 hours.     If I remember right, you were only tolerating 20/hr, so I would try 30 ml/hr for the next few days, then try to get to 40. Please let me know how you are doing!    Recommendations:  If unable to tolerate increase to goal rate within the next week, would consider conversation about TPN

## 2023-07-14 ENCOUNTER — LAB (OUTPATIENT)
Dept: LAB | Facility: CLINIC | Age: 63
End: 2023-07-14

## 2023-07-14 DIAGNOSIS — Z94.2 LUNG REPLACED BY TRANSPLANT (H): ICD-10-CM

## 2023-07-14 LAB
BACTERIA BRONCH: ABNORMAL
BACTERIA BRONCH: ABNORMAL
BRONCHOSCOPY: NORMAL

## 2023-07-14 PROCEDURE — 80197 ASSAY OF TACROLIMUS: CPT | Performed by: INTERNAL MEDICINE

## 2023-07-15 LAB
TACROLIMUS BLD-MCNC: 9.9 UG/L (ref 5–15)
TME LAST DOSE: NORMAL H
TME LAST DOSE: NORMAL H

## 2023-07-17 ENCOUNTER — TELEPHONE (OUTPATIENT)
Dept: TRANSPLANT | Facility: CLINIC | Age: 63
End: 2023-07-17
Payer: MEDICARE

## 2023-07-17 ENCOUNTER — HOSPITAL ENCOUNTER (OUTPATIENT)
Age: 63
End: 2023-07-17
Attending: INTERNAL MEDICINE
Payer: MEDICARE

## 2023-07-17 DIAGNOSIS — Z94.2 LUNG REPLACED BY TRANSPLANT (H): Primary | ICD-10-CM

## 2023-07-17 RX ORDER — LEVOFLOXACIN 250 MG/1
TABLET, FILM COATED ORAL
Qty: 12 TABLET | Refills: 0 | Status: SHIPPED | OUTPATIENT
Start: 2023-07-17 | End: 2023-07-27

## 2023-07-17 NOTE — TELEPHONE ENCOUNTER
Writer called patient to check in.     TF continues to run at 30ml/hr. Goal 40ml/hr. Eating two meals a day.   Weight last Tuesday in clinic 109.7 lbs, weight today 109.7.   Last increased to 30ml/hr a couple days ago. Doesn't run TF at night because diarrhea is worse. TF running from 8AM- 8PM.     Nausea continues to come and go.   Using Imodium TID. Diarrhea staying the same.     Reviewed plan with Kaylin Triana:   -message sent to RD. Plan to make patient NPO, and increase duration of TF.  -Will check in with patient Thursday of this week

## 2023-07-17 NOTE — TELEPHONE ENCOUNTER
After talking with Kera DAS, pt not even close to meeting nutritional needs. Pt not truthful when first talking with writer. See RD phone encounter.     Reviewed again with Kaylin Triana.   Plan to admit patient to [facility] for failure to thrive. Possible start of TPN?   Admissions aware. Will set up three way call with Kaylin Triana.   Pt tearful but understanding of plan.

## 2023-07-17 NOTE — RESULT ENCOUNTER NOTE
Tacrolimus level 9.9 at 12 hours, on 7/14/23.  Goal 8-12.     No change in dose, level at goal   MyCSupersolidt message sent

## 2023-07-17 NOTE — RESULT ENCOUNTER NOTE
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 750mg daily x1 followed by 250mg daily x9 days, total 10 days of treatment

## 2023-07-20 NOTE — PROGRESS NOTES
"Writer talked with admissions, still no bed availability for planned admission. Plan was to have patient come in through ER if no bed by Thursday afternoon to prevent condition  worsening especially with unpredicted wait time for hospital bed.     Pt instructed to present to ER as planned previously.   Pt sends follow response:   \"No, I don't have anyone to bring me. And I'm NOT coming down to sit in ER! I will come when there's a bed available, no matter how long it takes!     Sorry, I'm not trying to be rude but I won't come without the availability!\"    Pt will continue to wait for bed availability against transplant pulm recommendations. Patient instructed to let writer know if symptoms worsen at al over the next couple days.     "

## 2023-07-24 NOTE — PROGRESS NOTES
"Transplant Coordinator Note    Reason for visit: Post lung transplant follow up visit   Coordinator: Present   Caregiver:  None     Health concerns addressed today:  1. Resp: Breathing feels good. No SOB, no cough. PFTs improved/stable.   2. ENT:  3. GI: Feels nauseous \"pretty much everyday\" worse towards the end of the day. No vomiting. Stools \"runny\" sometimes it's \"mush\" going 6-7 times a day, will rarely go 2-3 times a day every once in a while.   4. TF running at 20ml/hr, occasionally she has been having TF running continuously (x2 days). Mostly running at 12 hours, goal is 40 ml/hr. On average TF going 6-12 hours daily at 20 ml/hr. TF running during daytime hours. Will eat breakfast every morning but gets a little nauseous and has to sit down afterwards. Will have smaller baked potatoe with rice for lunch but again has nausea.   5. Weight up a little today.     Activity/rehab   Up ad magalie. PT for legs every other week.   Oxygen needs: Only using oxygen at night, 2L  Pain management/RX: tylenol and oxycodone as needed, incisional pain from time to time.   Diabetic management: managed by PCP; checking twice per day  Next Bronch due: after next appt in July  Risk Criteria Labs: negative 7/28/22  CMV status: D+/R+  Valcyte stopped: POD 8-90  EBV status: D+/R+  AC/asa: ASA discontinued after recent hospitalization   PJP prophylactic: Dapsone   Diet: Ad magalie. TF at 30 ml. 24 hours/day.      COVID:  COVID-19 infection (yes/no, date of most recent positive test): no  Status/instructions given about COVID-19 vaccine: yes     Pt Education: medications (use/dose/side effects), how/when to call coordinator, frequency of labs, s/s of infection/rejection, call prior to starting any new medications, lab/vital sign book     Health Maintenance:   Last colonoscopy: March 2023  Next colonoscopy due: March 2026  Dermatology: done in May  Vaccinations this visit: none    Labs, CXR, PFTs reviewed with patient  Medication record " reviewed and reconciled  Questions and concerns addressed    Patient Instructions  1. Continue to hydrate with 60-70 oz fluids daily.  2. Continue to exercise daily or most days of the week.  3. Follow up with your primary care provider for annual gender health maintenance procedures.  4. Follow up with colonoscopy schedule.  5. Follow up with annual dermatology visits.  6. It doesn't seem like the COVID vaccine is working well in lung transplant patients. A number of lung transplant patients have gotten sick with COVID even after receiving the vaccines. Based on our recent experience, it can be life-threatening to get COVID  even after being vaccinated. Please continue to act like you did not get the COVID vaccine - social distancing, wearing a mask, good hand hygiene, etc. If the people around you are vaccinated, it will help reduce the risk of you getting COVID. All members of your household should be vaccinated.  7. Follow up to be determined. We will touch base with Dr. Navarro.     Next transplant clinic appointment:  with CXR, labs and PFTs  Next lab draw: Friday     AVS printed at time of check out

## 2023-07-24 NOTE — TELEPHONE ENCOUNTER
Per [facility]lin [facility], pt is currently too ill with failure to thrive, intolerant of tube feedings, nausea, severe gastroparesis, diarrhea. Kaylin recommends ending pre kidney transplant referral at this time, she will re-refer pt in the future when suitable to proceed with kidney transplant evaluation.     Generated letter to pt/ providers.     Ended pre kidney transplant referral.

## 2023-07-25 ENCOUNTER — LAB (OUTPATIENT)
Dept: LAB | Facility: CLINIC | Age: 63
End: 2023-07-25
Attending: PHYSICIAN ASSISTANT
Payer: MEDICARE

## 2023-07-25 ENCOUNTER — OFFICE VISIT (OUTPATIENT)
Dept: PULMONOLOGY | Facility: CLINIC | Age: 63
End: 2023-07-25
Attending: PHYSICIAN ASSISTANT
Payer: MEDICARE

## 2023-07-25 ENCOUNTER — ANCILLARY PROCEDURE (OUTPATIENT)
Dept: GENERAL RADIOLOGY | Facility: CLINIC | Age: 63
End: 2023-07-25
Attending: PHYSICIAN ASSISTANT
Payer: MEDICARE

## 2023-07-25 VITALS
WEIGHT: 111 LBS | HEART RATE: 94 BPM | DIASTOLIC BLOOD PRESSURE: 79 MMHG | SYSTOLIC BLOOD PRESSURE: 130 MMHG | OXYGEN SATURATION: 95 % | BODY MASS INDEX: 20.3 KG/M2

## 2023-07-25 DIAGNOSIS — Z94.2 S/P LUNG TRANSPLANT (H): Primary | ICD-10-CM

## 2023-07-25 DIAGNOSIS — Z94.2 LUNG REPLACED BY TRANSPLANT (H): Primary | ICD-10-CM

## 2023-07-25 DIAGNOSIS — Z94.2 LUNG REPLACED BY TRANSPLANT (H): ICD-10-CM

## 2023-07-25 DIAGNOSIS — K31.84 GASTROPARESIS: ICD-10-CM

## 2023-07-25 LAB
ANION GAP SERPL CALCULATED.3IONS-SCNC: 8 MMOL/L (ref 7–15)
BUN SERPL-MCNC: 12.8 MG/DL (ref 8–23)
CALCIUM SERPL-MCNC: 9 MG/DL (ref 8.8–10.2)
CHLORIDE SERPL-SCNC: 102 MMOL/L (ref 98–107)
CREAT SERPL-MCNC: 3.2 MG/DL (ref 0.51–0.95)
DEPRECATED HCO3 PLAS-SCNC: 27 MMOL/L (ref 22–29)
ERYTHROCYTE [DISTWIDTH] IN BLOOD BY AUTOMATED COUNT: 15.2 % (ref 10–15)
EXPTIME-PRE: 5.8 SEC
FEF2575-%PRED-PRE: 199 %
FEF2575-PRE: 4.12 L/SEC
FEF2575-PRED: 2.07 L/SEC
FEFMAX-%PRED-PRE: 77 %
FEFMAX-PRE: 4.73 L/SEC
FEFMAX-PRED: 6.09 L/SEC
FEV1-%PRED-PRE: 69 %
FEV1-PRE: 1.54 L
FEV1FEV6-PRE: 99 %
FEV1FEV6-PRED: 81 %
FEV1FVC-PRE: 99 %
FEV1FVC-PRED: 80 %
FIFMAX-PRE: 3.22 L/SEC
FVC-%PRED-PRE: 55 %
FVC-PRE: 1.55 L
FVC-PRED: 2.78 L
GFR SERPL CREATININE-BSD FRML MDRD: 16 ML/MIN/1.73M2
GLUCOSE SERPL-MCNC: 124 MG/DL (ref 70–99)
HCT VFR BLD AUTO: 35.9 % (ref 35–47)
HGB BLD-MCNC: 11 G/DL (ref 11.7–15.7)
MAGNESIUM SERPL-MCNC: 2 MG/DL (ref 1.7–2.3)
MCH RBC QN AUTO: 35.5 PG (ref 26.5–33)
MCHC RBC AUTO-ENTMCNC: 30.6 G/DL (ref 31.5–36.5)
MCV RBC AUTO: 116 FL (ref 78–100)
PLATELET # BLD AUTO: 150 10E3/UL (ref 150–450)
POTASSIUM SERPL-SCNC: 4.2 MMOL/L (ref 3.4–5.3)
RBC # BLD AUTO: 3.1 10E6/UL (ref 3.8–5.2)
SODIUM SERPL-SCNC: 137 MMOL/L (ref 136–145)
WBC # BLD AUTO: 5.2 10E3/UL (ref 4–11)

## 2023-07-25 PROCEDURE — 99214 OFFICE O/P EST MOD 30 MIN: CPT | Mod: 25 | Performed by: PHYSICIAN ASSISTANT

## 2023-07-25 PROCEDURE — 87799 DETECT AGENT NOS DNA QUANT: CPT | Performed by: INTERNAL MEDICINE

## 2023-07-25 PROCEDURE — 83735 ASSAY OF MAGNESIUM: CPT | Performed by: PATHOLOGY

## 2023-07-25 PROCEDURE — 36415 COLL VENOUS BLD VENIPUNCTURE: CPT | Performed by: PATHOLOGY

## 2023-07-25 PROCEDURE — 94375 RESPIRATORY FLOW VOLUME LOOP: CPT | Performed by: PHYSICIAN ASSISTANT

## 2023-07-25 PROCEDURE — G0463 HOSPITAL OUTPT CLINIC VISIT: HCPCS | Performed by: PHYSICIAN ASSISTANT

## 2023-07-25 PROCEDURE — 80048 BASIC METABOLIC PNL TOTAL CA: CPT | Performed by: PATHOLOGY

## 2023-07-25 PROCEDURE — 99000 SPECIMEN HANDLING OFFICE-LAB: CPT | Performed by: PATHOLOGY

## 2023-07-25 PROCEDURE — 71046 X-RAY EXAM CHEST 2 VIEWS: CPT | Mod: GC | Performed by: RADIOLOGY

## 2023-07-25 PROCEDURE — 85027 COMPLETE CBC AUTOMATED: CPT | Performed by: PATHOLOGY

## 2023-07-25 RX ORDER — SEVELAMER CARBONATE FOR ORAL SUSPENSION 800 MG/1
0.8 POWDER, FOR SUSPENSION ORAL
Start: 2023-07-25

## 2023-07-25 NOTE — PROGRESS NOTES
[facility]  July 25, 2023         Assessment and Plan:   Sofie Rodriguez is a 63 year old female with h/o COPD s/p BSLT 6/28/22 with course complicated by post-operative hemidiaphragm palsy, persistent pleural effusions, recurrent PNAs, positive DSA, EBV viremia, hypogammaglobulinemia, severe gastroparesis s/p G/J tube placement 7/27/22 with pyloric botox 1/25/23, GI bleed 2/2 pyloric ulcer, hemobilia s/p ERCP and MRCP, chronic diarrhea, recurrent C diff colitis, and ESRD on HD T/Th/Sat. PMH also notable for HFpEF, ASCVD, NTM colonization, hep C, and methamphetamine use. Admitted 5/17-5/23 for intolerance of tube feedings with weight loss, neutropenia, and failure to thrive. She has been waiting for a bed for one week for ongoing FTT with 17 lbs weight loss, tube feed intolerance, nausea and diarrhea, but is seen in the interim today.     1. S/p bilateral lung transplant:   Right hemidiaphragm palsy:   Nocturnal hypoxia: no new pulmonary complaints, has started PT back up for leg weakness. On RA during the day, using 2L O2 at night. Sating 95% on room air. CMV 6/7 negative. ImmuKnow assay 73 on 5/18, indicative of increased risk of infection, down to 43 on recheck. S/p bronch on  7/12 with cultures + for H. Influenzae currently on levaquin, no evidence of rejection ([id number]). CXR reviewed today and demonstrates stable changes of transplant. PFTs  improved 110 ml today, back to her post transplant best.   - 2L NC overnight--has sleep study ordered for August  - Continue IS including tacrolimus (goal 8-12) and prednisone  - Consider changing to dexamethasone  - AZA on hold given low ImmunKnow  - Dapsone qMWF for PJP ppx     2. Positive DSA: no prior treatment for AMR but has been a concern since pt. initially demonstrated rising DSA post-transplant. Ongoing positive DQB2 (MFI of 6217, down from 7847) on 7/11.     3. Hypogammaglobulinemia: h/o prior IVIG  infusions but most recent IgG adequate at 959 on 5/18.   - IgG with next lab draw     4. EBV viremia: last level of 49K (log 4.7) on 6/23.   - EBV pending for today     5. Severe malnutrition of chronic illness:  Gastroparesis s/p PEG/J and botox:   Pyloric ulcer:  Acute on chronic diarrhea:  Recurrent C diff colitis: chronic diarrhea since transplant with recurrent episodes of C diff, most recently diagnosed on 5/9 and started on PO vancomycin. No improvement after formula change as OP on 4/18. Very little PO intake otherwise, reporting similar symptoms in addition to early satiety. GI consulted 5/18, see their notes for details, consistent with osmotic diarrhea. Transitioned to continuous TF, able to tolerate 30 ml/hr although noted nausea and abdominal cramping with increase to 40 ml/hr, which is her goal. Currently, cannot tolerate TF at more than 20 ml/hr for typically 6-12 hours (starts it ` 8 am) before feeling nauseous and turns feeding off. Notes she is eating small meals, but again, has nausea associated with every meal and has obvious food in her stomach on CXR. Diarrhea ongoing despite imodium 3 times/day, typically 6-7 mushy to liquid stools. Weight up slightly today to 111 lbs, but remains well below her recent weight of 126 lb.   - Still plan to admit for the above  - Continue imodium TID, pantoprazole 40 mg BID  - Will message Dr. Navarro as she does see him on 8/2    6. CKD:   HTN: BP overall controlled. Doing dialysis T/Th/Sat. Transplant pre evaluation is currently on hold for kidney until we can improve her GI issues.    In my opinion, patient could benefit from NPO TF +/- TPN, possible substitution of dexamethasone for prednisone and very close GI monitoring.     RTC: plan is still to admit to the hospital for FTT  Vaccinations: recommend repeat bivalent covid vaccine  Annual dermatology visit: following Derm   Preventative: DEXA > June 2023; colonoscopy completed March 2023 (diverticulosis in the  "sigmoid colon and colonic polyp noted s/p polypectomy)--needs repeat in 2026 per notes, mammogram completed this past winter and was negative    Kaylin Triana PA-C  Pulmonary, Allergy, Critical Care and Sleep Medicine        Interval History:     Has been running TF every day since the tube was changed out. Only at 20 ml/hr (goal is 40 ml/hr), has been running it between 6-12 hours per day. Did have two episodes where she was able to tolerate a 25 hour run and a 29 horu run. Starts her TF around 8:00 am each day. Daily nausea, worse toward the end of the day. Stops her TF when she feels like she is going to be sick, no vomiting since the tube exchanged. Stools are \"mush to totally runny\" having 6-7 stools/day. Notes she is eating breakfast daily and that does make her nauseous all the time and she needs to go and sit down. Does have some lunch (small potato and rice) and that also makes her feels nauseous as well.     No breathing issues, no new shortness of breath and no cough.           Review of Systems:   Please see HPI, otherwise the complete 10 point ROS is negative.           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    Hepatitis 2017    Hep C, [facility]    HTN (hypertension)     Lung infection 11/30/2022    Osteopenia      Past Surgical History:   Procedure Laterality Date    BRONCHOSCOPY (RIGID OR FLEXIBLE), DIAGNOSTIC N/A 8/2/2022    Procedure: BRONCHOSCOPY, DIAGNOSTIC- inspection Bronch;  Surgeon: Kamala Lovell MD;  Location: [facility]    BRONCHOSCOPY (RIGID OR FLEXIBLE), DIAGNOSTIC N/A 9/13/2022    Procedure: INSPECTION BRONCHOSCOPY, WITH BRONCHOALVEOLAR LAVAGE;  Surgeon: Jose R Mccullough MD;  Location: [facility]    BRONCHOSCOPY (RIGID OR FLEXIBLE), DIAGNOSTIC N/A 11/9/2022    Procedure: BRONCHOSCOPY, " WITH BRONCHOALVEOLAR LAVAGE AND BIOPSY;  Surgeon: Cesar Lima MD;  Location:  GI    BRONCHOSCOPY (RIGID OR FLEXIBLE), DIAGNOSTIC N/A 1/25/2023    Procedure: BRONCHOSCOPY, WITH BRONCHOALVEOLAR LAVAGE AND BIOPSY;  Surgeon: Mason Reddy MD;  Location:  GI    BRONCHOSCOPY (RIGID OR FLEXIBLE), DIAGNOSTIC N/A 4/19/2023    Procedure: BRONCHOSCOPY, WITH BRONCHOALVEOLAR LAVAGE AND BIOPSY;  Surgeon: Kamala Lovell MD;  Location:  GI    BRONCHOSCOPY (RIGID OR FLEXIBLE), DIAGNOSTIC N/A 7/12/2023    Procedure: BRONCHOSCOPY, WITH BRONCHOALVEOLAR LAVAGE AND BIOPSY;  Surgeon: Cesar Lima MD;  Location:  GI    BRONCHOSCOPY FLEXIBLE AND RIGID N/A 07/19/2022    Procedure: BRONCHOSCOPY inspection only;  Surgeon: Bob Liao MD;  Location:  GI    COLONOSCOPY  2015    CV CORONARY ANGIOGRAM N/A 06/30/2021    Procedure: CV CORONARY ANGIOGRAM;  Surgeon: Alexander Cuellar MD;  Location:  HEART CARDIAC CATH LAB    CV RIGHT HEART CATH MEASUREMENTS RECORDED N/A 06/30/2021    Procedure: CV RIGHT HEART CATH;  Surgeon: Alexander Cuellar MD;  Location:  HEART CARDIAC CATH LAB    ENDOSCOPIC RETROGRADE CHOLANGIOPANCREATOGRAM N/A 8/11/2022    Procedure: ENDOSCOPIC RETROGRADE CHOLANGIOPANCREATOGRAPHY WITH PANCREATIC DUCT NEEDLE KNIFE AND STENT PLACEMENT, BILE DUCT SPHINCTEROTOMY, BLOOD/DEBRIS REMOVAL AND STENT PLACEMENT;  Surgeon: Cosmo Arroyo MD;  Location:  OR    ENDOSCOPIC RETROGRADE CHOLANGIOPANCREATOGRAM N/A 10/7/2022    Procedure: ENDOSCOPIC RETROGRADE CHOLANGIOPANCREATOGRAPHY with biliary and pancreatic stent removal, debris removal;  Surgeon: Cosmo Arroyo MD;  Location:  OR    ENT SURGERY  1974    tonsillectomy    ENTEROSCOPY SMALL BOWEL N/A 8/11/2022    Procedure: SMALL BOWEL ENTEROSCOPY;  Surgeon: Cosmo Arroyo MD;  Location:  OR    ESOPHAGOGASTRODUODENOSCOPY, WITH NASOGASTRIC TUBE INSERTION N/A 07/01/2022    Procedure: ESOPHAGOGASTRODUODENOSCOPY, WITH NASOJEJUNAL  TUBE INSERTION;  Surgeon: Ozzy Nickerson MD;  Location:  GI    ESOPHAGOSCOPY, GASTROSCOPY, DUODENOSCOPY (EGD), COMBINED N/A 8/3/2022    Procedure: ESOPHAGOGASTRODUODENOSCOPY (EGD);  Surgeon: Ira Andres MD;  Location:  GI    ESOPHAGOSCOPY, GASTROSCOPY, DUODENOSCOPY (EGD), COMBINED N/A 1/25/2023    Procedure: ESOPHAGOGASTRODUODENOSCOPY (EGD) with botox injection;  Surgeon: Gonzalez Navarro MD;  Location:  GI    HAND SURGERY      INSERT CHEST TUBE Right 9/13/2022    Procedure: Insert chest tube;  Surgeon: Jose R Mccullough MD;  Location:  GI    IR CVC TUNNEL PLACEMENT > 5 YRS OF AGE  9/26/2022    IR GASTRO JEJUNOSTOMY TUBE CHANGE  8/31/2022    IR GASTRO JEJUNOSTOMY TUBE CHANGE  12/21/2022    IR GASTRO JEJUNOSTOMY TUBE CHANGE  7/12/2023    IR GASTRO JEJUNOSTOMY TUBE PLACEMENT  7/27/2022    IR THORACENTESIS  8/29/2022    LEEP TX, CERVICAL  04/07/2017    HECTOR III    LYMPH NODE BIOPSY Left 2005    Left axilla, benign- [facility]    MIDLINE INSERTION - DOUBLE LUMEN Left 07/28/2022    20cm, Basilic vein    REPLACE GASTROJEJUNOSTOMY TUBE, PERCUTANEOUS  10/7/2022    Procedure: Replace Gastrojejunostomy Tube;  Surgeon: Cosmo Arroyo MD;  Location: [facility]    THORACENTESIS Left 8/29/2022    Procedure: THORACENTESIS;  Surgeon: Bo Capone PA-C;  Location: [facility] OR    THORACENTESIS Left 9/13/2022    Procedure: Thoracentesis;  Surgeon: Jose R Mccullough MD;  Location: [facility]    THROMBECTOMY UPPER EXTREMITY Left 07/02/2022    Procedure: LEFT RADIAL ARM THROMBECTOMY;  Surgeon: Christie Graham MD;  Location:  OR    TRANSPLANT LUNG RECIPIENT SINGLE X2 Bilateral 06/28/2022    Procedure: Clamshell Incision, Bilateral Sequential Lung Transplant, On Cardiopulmonary Bypass, Flexible Bronchoscopy;  Surgeon: Sue Sunshine MD;  Location:  OR           Family History:     No family history on file.         Social History:     Social History     Socioeconomic History    Marital  status: Single     Spouse name: Not on file    Number of children: Not on file    Years of education: Not on file    Highest education level: Not on file   Occupational History    Not on file   Tobacco Use    Smoking status: Former     Years: 30.00     Types: Cigarettes     Quit date: 2020     Years since quittin.7    Smokeless tobacco: Never   Substance and Sexual Activity    Alcohol use: Not Currently    Drug use: Not Currently     Types: Marijuana, Methamphetamines     Comment: hx:marijuana and methamphetamine-quit both unsure ?  2-3 yrs ago    Sexual activity: Not on file   Other Topics Concern    Parent/sibling w/ CABG, MI or angioplasty before 65F 55M? Not Asked   Social History Narrative    Not on file     Social Determinants of Health     Financial Resource Strain: Not on file   Food Insecurity: Not on file   Transportation Needs: Not on file   Physical Activity: Not on file   Stress: Not on file   Social Connections: Not on file   Intimate Partner Violence: Not on file   Housing Stability: Not on file            Medications:     Current Outpatient Medications   Medication    alcohol swab prep pads    azaTHIOprine (IMURAN) 5 mg/mL SUSP    blood glucose (CONTOUR NEXT TEST) test strip    blood glucose (NO BRAND SPECIFIED) lancets standard    blood glucose monitoring (CONTOUR NEXT MONITOR W/DEVICE KIT) meter device kit    Calcium Carbonate-Vitamin D 600-10 MG-MCG TABS    cyanocobalamin (CYANOCOBALAMIN) 500 MCG tablet    dapsone 2 mg/mL SUSP    insulin pen needle (32G X 4 MM) 32G X 4 MM miscellaneous    levofloxacin (LEVAQUIN) 250 MG tablet    loperamide (IMODIUM A-D) 2 MG tablet    metoprolol tartrate (LOPRESSOR) 50 MG tablet    Nutritional Supplements (GLUCOSE MANAGEMENT) TABS    pantoprazole (PROTONIX) 2 mg/mL SUSP suspension    predniSONE (DELTASONE) 5 MG tablet    protein modular (PROSOURCE TF) LIQD    Sharps Container MISC    tacrolimus (GENERIC EQUIVALENT) 1 mg/mL suspension     No current  facility-administered medications for this visit.            Physical Exam:   /79   Pulse 94   Wt 50.3 kg (111 lb)   SpO2 95%   BMI 20.30 kg/m      GENERAL: alert, NAD  HEENT: NCAT, EOMI, no scleral icterus, oral mucosa moist and without lesions  Neck: no cervical or supraclavicular adenopathy  Lungs: moderate air flow, mainly clear  CV: RRR, S1S2, no murmurs noted  Abdomen: normoactive BS, soft, non tender  Lymph: no edema  Neuro: AAO X 3, CN 2-12 grossly intact  Psychiatric: normal affect, good eye contact  Skin: no rash, jaundice or lesions on limited exam         Data:   All laboratory and imaging data reviewed.      Recent Results (from the past 168 hour(s))   CBC with platelets    Collection Time: 07/25/23 12:30 PM   Result Value Ref Range    WBC Count 5.2 4.0 - 11.0 10e3/uL    RBC Count 3.10 (L) 3.80 - 5.20 10e6/uL    Hemoglobin 11.0 (L) 11.7 - 15.7 g/dL    Hematocrit 35.9 35.0 - 47.0 %     (H) 78 - 100 fL    MCH 35.5 (H) 26.5 - 33.0 pg    MCHC 30.6 (L) 31.5 - 36.5 g/dL    RDW 15.2 (H) 10.0 - 15.0 %    Platelet Count 150 150 - 450 10e3/uL   Magnesium    Collection Time: 07/25/23 12:30 PM   Result Value Ref Range    Magnesium 2.0 1.7 - 2.3 mg/dL   Basic metabolic panel    Collection Time: 07/25/23 12:30 PM   Result Value Ref Range    Sodium 137 136 - 145 mmol/L    Potassium 4.2 3.4 - 5.3 mmol/L    Chloride 102 98 - 107 mmol/L    Carbon Dioxide (CO2) 27 22 - 29 mmol/L    Anion Gap 8 7 - 15 mmol/L    Urea Nitrogen 12.8 8.0 - 23.0 mg/dL    Creatinine 3.20 (H) 0.51 - 0.95 mg/dL    Calcium 9.0 8.8 - 10.2 mg/dL    Glucose 124 (H) 70 - 99 mg/dL    GFR Estimate 16 (L) >60 mL/min/1.73m2   General PFT Lab (Please always keep checked)    Collection Time: 07/25/23 12:38 PM   Result Value Ref Range    FVC-Pred 2.78 L    FVC-Pre 1.55 L    FVC-%Pred-Pre 55 %    FEV1-Pre 1.54 L    FEV1-%Pred-Pre 69 %    FEV1FVC-Pred 80 %    FEV1FVC-Pre 99 %    FEFMax-Pred 6.09 L/sec    FEFMax-Pre 4.73 L/sec     FEFMax-%Pred-Pre 77 %    FEF2575-Pred 2.07 L/sec    FEF2575-Pre 4.12 L/sec    KHP4128-%Pred-Pre 199 %    ExpTime-Pre 5.80 sec    FIFMax-Pre 3.22 L/sec    FEV1FEV6-Pred 81 %    FEV1FEV6-Pre 99 %     PFT interpretation:  Maneuver: valid and met ATS guidelines  Mild obstruction based on Z score  Compared to prior: FEV1 of 1.54 is 110 ml above prior  Decreased in FVC may be related to air trapping or restriction; lung volumes would be necessary to determine

## 2023-07-25 NOTE — LETTER
"    7/25/2023         RE: Sofie Rodriguez  [address] [place] [address]        Dear Colleague,    Thank you for referring your patient, Sofie Rodriguez, to the [facility] [place]. Please see a copy of my visit note below.    Transplant Coordinator Note    Reason for visit: Post lung transplant follow up visit   Coordinator: Present   Caregiver:  None     Health concerns addressed today:  1. Resp: Breathing feels good. No SOB, no cough. PFTs improved/stable.   2. ENT:  3. GI: Feels nauseous \"pretty much everyday\" worse towards the end of the day. No vomiting. Stools \"runny\" sometimes it's \"mush\" going 6-7 times a day, will rarely go 2-3 times a day every once in a while.   4. TF running at 20ml/hr, occasionally she has been having TF running continuously (x2 days). Mostly running at 12 hours, goal is 40 ml/hr. On average TF going 6-12 hours daily at 20 ml/hr. TF running during daytime hours. Will eat breakfast every morning but gets a little nauseous and has to sit down afterwards. Will have smaller baked potatoe with rice for lunch but again has nausea.   5. Weight up a little today.     Activity/rehab   Up ad magalie. PT for legs every other week.   Oxygen needs: Only using oxygen at night, 2L  Pain management/RX: tylenol and oxycodone as needed, incisional pain from time to time.   Diabetic management: managed by PCP; checking twice per day  Next Bronch due: after next appt in July  Risk Criteria Labs: negative 7/28/22  CMV status: D+/R+  Valcyte stopped: POD 8-90  EBV status: D+/R+  AC/asa: ASA discontinued after recent hospitalization   PJP prophylactic: Dapsone   Diet: Ad magalie. TF at 30 ml. 24 hours/day.      COVID:  COVID-19 infection (yes/no, date of most recent positive test): no  Status/instructions given about COVID-19 vaccine: yes     Pt Education: medications (use/dose/side effects), how/when to call coordinator, frequency of labs, s/s of " infection/rejection, call prior to starting any new medications, lab/vital sign book     Health Maintenance:   Last colonoscopy: March 2023  Next colonoscopy due: March 2026  Dermatology: done in May  Vaccinations this visit: none    Labs, CXR, PFTs reviewed with patient  Medication record reviewed and reconciled  Questions and concerns addressed    Patient Instructions  1. Continue to hydrate with 60-70 oz fluids daily.  2. Continue to exercise daily or most days of the week.  3. Follow up with your primary care provider for annual gender health maintenance procedures.  4. Follow up with colonoscopy schedule.  5. Follow up with annual dermatology visits.  6. It doesn't seem like the COVID vaccine is working well in lung transplant patients. A number of lung transplant patients have gotten sick with COVID even after receiving the vaccines. Based on our recent experience, it can be life-threatening to get COVID  even after being vaccinated. Please continue to act like you did not get the COVID vaccine - social distancing, wearing a mask, good hand hygiene, etc. If the people around you are vaccinated, it will help reduce the risk of you getting COVID. All members of your household should be vaccinated.  7. Follow up to be determined. We will touch base with Dr. Navarro.     Next transplant clinic appointment:  with CXR, labs and PFTs  Next lab draw: Friday     AVS printed at time of check out        [facility]  July 25, 2023         Assessment and Plan:   Sofie Rodriguez is a 63 year old female with h/o COPD s/p BSLT 6/28/22 with course complicated by post-operative hemidiaphragm palsy, persistent pleural effusions, recurrent PNAs, positive DSA, EBV viremia, hypogammaglobulinemia, severe gastroparesis s/p G/J tube placement 7/27/22 with pyloric botox 1/25/23, GI bleed 2/2 pyloric ulcer, hemobilia s/p ERCP and MRCP, chronic diarrhea, recurrent C diff colitis,  and ESRD on HD T/Th/Sat. PMH also notable for HFpEF, ASCVD, NTM colonization, hep C, and methamphetamine use. Admitted 5/17-5/23 for intolerance of tube feedings with weight loss, neutropenia, and failure to thrive. She has been waiting for a bed for one week for ongoing FTT with 17 lbs weight loss, tube feed intolerance, nausea and diarrhea, but is seen in the interim today.     1. S/p bilateral lung transplant:   Right hemidiaphragm palsy:   Nocturnal hypoxia: no new pulmonary complaints, has started PT back up for leg weakness. On RA during the day, using 2L O2 at night. Sating 95% on room air. CMV 6/7 negative. ImmuKnow assay 73 on 5/18, indicative of increased risk of infection, down to 43 on recheck. S/p bronch on  7/12 with cultures + for H. Influenzae currently on levaquin, no evidence of rejection ([id number]). CXR reviewed today and demonstrates stable changes of transplant. PFTs  improved 110 ml today, back to her post transplant best.   - 2L NC overnight--has sleep study ordered for August  - Continue IS including tacrolimus (goal 8-12) and prednisone  - Consider changing to dexamethasone  - AZA on hold given low ImmunKnow  - Dapsone qMWF for PJP ppx     2. Positive DSA: no prior treatment for AMR but has been a concern since pt. initially demonstrated rising DSA post-transplant. Ongoing positive DQB2 (MFI of 6217, down from 7847) on 7/11.     3. Hypogammaglobulinemia: h/o prior IVIG infusions but most recent IgG adequate at 959 on 5/18.   - IgG with next lab draw     4. EBV viremia: last level of 49K (log 4.7) on 6/23.   - EBV pending for today     5. Severe malnutrition of chronic illness:  Gastroparesis s/p PEG/J and botox:   Pyloric ulcer:  Acute on chronic diarrhea:  Recurrent C diff colitis: chronic diarrhea since transplant with recurrent episodes of C diff, most recently diagnosed on 5/9 and started on PO vancomycin. No improvement after formula change as OP on 4/18. Very little PO intake otherwise,  reporting similar symptoms in addition to early satiety. GI consulted 5/18, see their notes for details, consistent with osmotic diarrhea. Transitioned to continuous TF, able to tolerate 30 ml/hr although noted nausea and abdominal cramping with increase to 40 ml/hr, which is her goal. Currently, cannot tolerate TF at more than 20 ml/hr for typically 6-12 hours (starts it ` 8 am) before feeling nauseous and turns feeding off. Notes she is eating small meals, but again, has nausea associated with every meal and has obvious food in her stomach on CXR. Diarrhea ongoing despite imodium 3 times/day, typically 6-7 mushy to liquid stools. Weight up slightly today to 111 lbs, but remains well below her recent weight of 126 lb.   - Still plan to admit for the above  - Continue imodium TID, pantoprazole 40 mg BID  - Will message Dr. Navarro as she does see him on 8/2 6. CKD:   HTN: BP overall controlled. Doing dialysis T/Th/Sat. Transplant pre evaluation is currently on hold for kidney until we can improve her GI issues.    In my opinion, patient could benefit from NPO TF +/- TPN, possible substitution of dexamethasone for prednisone and very close GI monitoring.     RTC: plan is still to admit to the hospital for FTT  Vaccinations: recommend repeat bivalent covid vaccine  Annual dermatology visit: following Derm   Preventative: DEXA > June 2023; colonoscopy completed March 2023 (diverticulosis in the sigmoid colon and colonic polyp noted s/p polypectomy)--needs repeat in 2026 per notes, mammogram completed this past winter and was negative    Kaylin Triana PA-C  Pulmonary, Allergy, Critical Care and Sleep Medicine        Interval History:     Has been running TF every day since the tube was changed out. Only at 20 ml/hr (goal is 40 ml/hr), has been running it between 6-12 hours per day. Did have two episodes where she was able to tolerate a 25 hour run and a 29 horu run. Starts her TF around 8:00 am each day. Daily nausea,  "worse toward the end of the day. Stops her TF when she feels like she is going to be sick, no vomiting since the tube exchanged. Stools are \"mush to totally runny\" having 6-7 stools/day. Notes she is eating breakfast daily and that does make her nauseous all the time and she needs to go and sit down. Does have some lunch (small potato and rice) and that also makes her feels nauseous as well.     No breathing issues, no new shortness of breath and no cough.           Review of Systems:   Please see HPI, otherwise the complete 10 point ROS is negative.           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     Hepatitis 2017    Hep C, [facility]     HTN (hypertension)      Lung infection 11/30/2022     Osteopenia      Past Surgical History:   Procedure Laterality Date     BRONCHOSCOPY (RIGID OR FLEXIBLE), DIAGNOSTIC N/A 8/2/2022    Procedure: BRONCHOSCOPY, DIAGNOSTIC- inspection Bronch;  Surgeon: Kamala Lovell MD;  Location: [facility]     BRONCHOSCOPY (RIGID OR FLEXIBLE), DIAGNOSTIC N/A 9/13/2022    Procedure: INSPECTION BRONCHOSCOPY, WITH BRONCHOALVEOLAR LAVAGE;  Surgeon: Jose R Mccullough MD;  Location: [facility]     BRONCHOSCOPY (RIGID OR FLEXIBLE), DIAGNOSTIC N/A 11/9/2022    Procedure: BRONCHOSCOPY, WITH BRONCHOALVEOLAR LAVAGE AND BIOPSY;  Surgeon: Cesar Lima MD;  Location: [facility]     BRONCHOSCOPY (RIGID OR FLEXIBLE), DIAGNOSTIC N/A 1/25/2023    Procedure: BRONCHOSCOPY, WITH BRONCHOALVEOLAR LAVAGE AND BIOPSY;  Surgeon: Mason Reddy MD;  Location: [facility]     BRONCHOSCOPY (RIGID OR FLEXIBLE), DIAGNOSTIC N/A 4/19/2023    Procedure: BRONCHOSCOPY, WITH BRONCHOALVEOLAR LAVAGE AND BIOPSY;  Surgeon: Kamala Lovell MD;  Location: [facility]     BRONCHOSCOPY (RIGID OR FLEXIBLE), DIAGNOSTIC N/A 7/12/2023    " Procedure: BRONCHOSCOPY, WITH BRONCHOALVEOLAR LAVAGE AND BIOPSY;  Surgeon: Cesar Lima MD;  Location:  GI     BRONCHOSCOPY FLEXIBLE AND RIGID N/A 07/19/2022    Procedure: BRONCHOSCOPY inspection only;  Surgeon: Bob Liao MD;  Location:  GI     COLONOSCOPY  2015     CV CORONARY ANGIOGRAM N/A 06/30/2021    Procedure: CV CORONARY ANGIOGRAM;  Surgeon: Alexander Cuellar MD;  Location:  HEART CARDIAC CATH LAB     CV RIGHT HEART CATH MEASUREMENTS RECORDED N/A 06/30/2021    Procedure: CV RIGHT HEART CATH;  Surgeon: Alexander Cuellar MD;  Location:  HEART CARDIAC CATH LAB     ENDOSCOPIC RETROGRADE CHOLANGIOPANCREATOGRAM N/A 8/11/2022    Procedure: ENDOSCOPIC RETROGRADE CHOLANGIOPANCREATOGRAPHY WITH PANCREATIC DUCT NEEDLE KNIFE AND STENT PLACEMENT, BILE DUCT SPHINCTEROTOMY, BLOOD/DEBRIS REMOVAL AND STENT PLACEMENT;  Surgeon: Cosmo Arroyo MD;  Location:  OR     ENDOSCOPIC RETROGRADE CHOLANGIOPANCREATOGRAM N/A 10/7/2022    Procedure: ENDOSCOPIC RETROGRADE CHOLANGIOPANCREATOGRAPHY with biliary and pancreatic stent removal, debris removal;  Surgeon: Cosmo Arroyo MD;  Location:  OR     ENT SURGERY  1974    tonsillectomy     ENTEROSCOPY SMALL BOWEL N/A 8/11/2022    Procedure: SMALL BOWEL ENTEROSCOPY;  Surgeon: Cosmo Arroyo MD;  Location:  OR     ESOPHAGOGASTRODUODENOSCOPY, WITH NASOGASTRIC TUBE INSERTION N/A 07/01/2022    Procedure: ESOPHAGOGASTRODUODENOSCOPY, WITH NASOJEJUNAL TUBE INSERTION;  Surgeon: Ozzy Nickerson MD;  Location:  GI     ESOPHAGOSCOPY, GASTROSCOPY, DUODENOSCOPY (EGD), COMBINED N/A 8/3/2022    Procedure: ESOPHAGOGASTRODUODENOSCOPY (EGD);  Surgeon: Ira Andres MD;  Location:  GI     ESOPHAGOSCOPY, GASTROSCOPY, DUODENOSCOPY (EGD), COMBINED N/A 1/25/2023    Procedure: ESOPHAGOGASTRODUODENOSCOPY (EGD) with botox injection;  Surgeon: Gonzalez Navarro MD;  Location:  GI     HAND SURGERY       INSERT CHEST TUBE Right 9/13/2022     Procedure: Insert chest tube;  Surgeon: Jose R Mccullough MD;  Location: [facility]     IR CVC TUNNEL PLACEMENT > 5 YRS OF AGE  2022     IR GASTRO JEJUNOSTOMY TUBE CHANGE  2022     IR GASTRO JEJUNOSTOMY TUBE CHANGE  2022     IR GASTRO JEJUNOSTOMY TUBE CHANGE  2023     IR GASTRO JEJUNOSTOMY TUBE PLACEMENT  2022     IR THORACENTESIS  2022     LEEP TX, CERVICAL  2017    HECTOR III     LYMPH NODE BIOPSY Left     Left axilla, benign- [place]     MIDLINE INSERTION - DOUBLE LUMEN Left 2022    20cm, Basilic vein     REPLACE GASTROJEJUNOSTOMY TUBE, PERCUTANEOUS  10/7/2022    Procedure: Replace Gastrojejunostomy Tube;  Surgeon: Cosmo Arroyo MD;  Location: [facility]     THORACENTESIS Left 2022    Procedure: THORACENTESIS;  Surgeon: Bo Capone PA-C;  Location: [facility] OR     THORACENTESIS Left 2022    Procedure: Thoracentesis;  Surgeon: Jose R Mccullough MD;  Location: [facility]     THROMBECTOMY UPPER EXTREMITY Left 2022    Procedure: LEFT RADIAL ARM THROMBECTOMY;  Surgeon: Christie Graham MD;  Location: [facility]     TRANSPLANT LUNG RECIPIENT SINGLE X2 Bilateral 2022    Procedure: Clamshell Incision, Bilateral Sequential Lung Transplant, On Cardiopulmonary Bypass, Flexible Bronchoscopy;  Surgeon: Sue Sunshine MD;  Location: [facility]           Family History:     No family history on file.         Social History:     Social History     Socioeconomic History     Marital status: Single     Spouse name: Not on file     Number of children: Not on file     Years of education: Not on file     Highest education level: Not on file   Occupational History     Not on file   Tobacco Use     Smoking status: Former     Years: 30.00     Types: Cigarettes     Quit date: 2020     Years since quittin.7     Smokeless tobacco: Never   Substance and Sexual Activity     Alcohol use: Not Currently     Drug use: Not Currently     Types: Marijuana, Methamphetamines      Comment: hx:marijuana and methamphetamine-quit both unsure ?  2-3 yrs ago     Sexual activity: Not on file   Other Topics Concern     Parent/sibling w/ CABG, MI or angioplasty before 65F 55M? Not Asked   Social History Narrative     Not on file     Social Determinants of Health     Financial Resource Strain: Not on file   Food Insecurity: Not on file   Transportation Needs: Not on file   Physical Activity: Not on file   Stress: Not on file   Social Connections: Not on file   Intimate Partner Violence: Not on file   Housing Stability: Not on file            Medications:     Current Outpatient Medications   Medication     alcohol swab prep pads     azaTHIOprine (IMURAN) 5 mg/mL SUSP     blood glucose (CONTOUR NEXT TEST) test strip     blood glucose (NO BRAND SPECIFIED) lancets standard     blood glucose monitoring (CONTOUR NEXT MONITOR W/DEVICE KIT) meter device kit     Calcium Carbonate-Vitamin D 600-10 MG-MCG TABS     cyanocobalamin (CYANOCOBALAMIN) 500 MCG tablet     dapsone 2 mg/mL SUSP     insulin pen needle (32G X 4 MM) 32G X 4 MM miscellaneous     levofloxacin (LEVAQUIN) 250 MG tablet     loperamide (IMODIUM A-D) 2 MG tablet     metoprolol tartrate (LOPRESSOR) 50 MG tablet     Nutritional Supplements (GLUCOSE MANAGEMENT) TABS     pantoprazole (PROTONIX) 2 mg/mL SUSP suspension     predniSONE (DELTASONE) 5 MG tablet     protein modular (PROSOURCE TF) LIQD     Sharps Container MISC     tacrolimus (GENERIC EQUIVALENT) 1 mg/mL suspension     No current facility-administered medications for this visit.            Physical Exam:   /79   Pulse 94   Wt 50.3 kg (111 lb)   SpO2 95%   BMI 20.30 kg/m      GENERAL: alert, NAD  HEENT: NCAT, EOMI, no scleral icterus, oral mucosa moist and without lesions  Neck: no cervical or supraclavicular adenopathy  Lungs: moderate air flow, mainly clear  CV: RRR, S1S2, no murmurs noted  Abdomen: normoactive BS, soft, non tender  Lymph: no edema  Neuro: AAO X 3, CN 2-12  grossly intact  Psychiatric: normal affect, good eye contact  Skin: no rash, jaundice or lesions on limited exam         Data:   All laboratory and imaging data reviewed.      Recent Results (from the past 168 hour(s))   CBC with platelets    Collection Time: 07/25/23 12:30 PM   Result Value Ref Range    WBC Count 5.2 4.0 - 11.0 10e3/uL    RBC Count 3.10 (L) 3.80 - 5.20 10e6/uL    Hemoglobin 11.0 (L) 11.7 - 15.7 g/dL    Hematocrit 35.9 35.0 - 47.0 %     (H) 78 - 100 fL    MCH 35.5 (H) 26.5 - 33.0 pg    MCHC 30.6 (L) 31.5 - 36.5 g/dL    RDW 15.2 (H) 10.0 - 15.0 %    Platelet Count 150 150 - 450 10e3/uL   Magnesium    Collection Time: 07/25/23 12:30 PM   Result Value Ref Range    Magnesium 2.0 1.7 - 2.3 mg/dL   Basic metabolic panel    Collection Time: 07/25/23 12:30 PM   Result Value Ref Range    Sodium 137 136 - 145 mmol/L    Potassium 4.2 3.4 - 5.3 mmol/L    Chloride 102 98 - 107 mmol/L    Carbon Dioxide (CO2) 27 22 - 29 mmol/L    Anion Gap 8 7 - 15 mmol/L    Urea Nitrogen 12.8 8.0 - 23.0 mg/dL    Creatinine 3.20 (H) 0.51 - 0.95 mg/dL    Calcium 9.0 8.8 - 10.2 mg/dL    Glucose 124 (H) 70 - 99 mg/dL    GFR Estimate 16 (L) >60 mL/min/1.73m2   General PFT Lab (Please always keep checked)    Collection Time: 07/25/23 12:38 PM   Result Value Ref Range    FVC-Pred 2.78 L    FVC-Pre 1.55 L    FVC-%Pred-Pre 55 %    FEV1-Pre 1.54 L    FEV1-%Pred-Pre 69 %    FEV1FVC-Pred 80 %    FEV1FVC-Pre 99 %    FEFMax-Pred 6.09 L/sec    FEFMax-Pre 4.73 L/sec    FEFMax-%Pred-Pre 77 %    FEF2575-Pred 2.07 L/sec    FEF2575-Pre 4.12 L/sec    ICR7313-%Pred-Pre 199 %    ExpTime-Pre 5.80 sec    FIFMax-Pre 3.22 L/sec    FEV1FEV6-Pred 81 %    FEV1FEV6-Pre 99 %     PFT interpretation:  Maneuver: valid and met ATS guidelines  Mild obstruction based on Z score  Compared to prior: FEV1 of 1.54 is 110 ml above prior  Decreased in FVC may be related to air trapping or restriction; lung volumes would be necessary to determine        Again, thank  you for allowing me to participate in the care of your patient.        Sincerely,        Kaylin Triana PA-C

## 2023-07-25 NOTE — NURSING NOTE
Chief Complaint   Patient presents with    Lung Transplant     2 week follow up      Vitals were taken and medications were reconciled.     Jeanne Ordonez RMA  1:20 PM

## 2023-07-26 ENCOUNTER — DOCUMENTATION ONLY (OUTPATIENT)
Dept: GASTROENTEROLOGY | Facility: CLINIC | Age: 63
End: 2023-07-26
Payer: MEDICARE

## 2023-07-26 LAB
CMV DNA SPEC NAA+PROBE-ACNC: NOT DETECTED IU/ML
EBV DNA COPIES/ML, INSTRUMENT: ABNORMAL COPIES/ML
EBV DNA SPEC NAA+PROBE-LOG#: 4.4 {LOG_COPIES}/ML

## 2023-07-26 NOTE — PROGRESS NOTES
Called PT and confirmed Appointment.    Called to remind patient of their upcoming appointment with our GI clinic, on 08/02/23 at 8:20 AM with Dr. Gonzalez Navarro. This appointment is scheduled as a video visit. You will receive a call approximately 30 minutes prior to check you in, you must be in [place] for this visit., if your appointment is virtual (video or telephone) you need to be in [place] for the visit. To reschedule or cancel patient to call [phone].      SK

## 2023-07-28 ENCOUNTER — LAB (OUTPATIENT)
Dept: LAB | Facility: CLINIC | Age: 63
End: 2023-07-28
Payer: MEDICARE

## 2023-07-28 DIAGNOSIS — Z94.2 LUNG REPLACED BY TRANSPLANT (H): ICD-10-CM

## 2023-07-28 PROCEDURE — 80197 ASSAY OF TACROLIMUS: CPT | Performed by: INTERNAL MEDICINE

## 2023-07-28 NOTE — PROGRESS NOTES
Pt sends [facility] message saying admissions called and has a bed available for planned admission however pt is refusing to be admitted against medical advice.     Pt will follow up with GI next week.

## 2023-07-29 LAB
TACROLIMUS BLD-MCNC: 7.4 UG/L (ref 5–15)
TME LAST DOSE: NORMAL H
TME LAST DOSE: NORMAL H

## 2023-07-31 DIAGNOSIS — Z94.2 S/P LUNG TRANSPLANT (H): Primary | ICD-10-CM

## 2023-07-31 DIAGNOSIS — Z94.2 S/P LUNG TRANSPLANT (H): ICD-10-CM

## 2023-07-31 NOTE — PROGRESS NOTES
Tacrolimus level 7.4 at 12 hours, on 7/28/23.  Goal 8-12.   Current dose 3 mg in AM, 3.5 mg in PM    Dose changed to 3.5 mg in AM, 3.5 mg in PM   Recheck level in a week    Discussed with patient    MyChart message sent     Writer checking in with patient to see how the weekend went.   Pt reports tube feedings have been running at 30ml/hr past two days for about 10 or 11 hours each day. Will try to go up to 35ml/hr today.   Weight up to 114.6lbs.

## 2023-08-02 ENCOUNTER — PREP FOR PROCEDURE (OUTPATIENT)
Dept: GASTROENTEROLOGY | Facility: CLINIC | Age: 63
End: 2023-08-02

## 2023-08-02 ENCOUNTER — VIRTUAL VISIT (OUTPATIENT)
Dept: GASTROENTEROLOGY | Facility: CLINIC | Age: 63
End: 2023-08-02
Attending: INTERNAL MEDICINE
Payer: MEDICARE

## 2023-08-02 ENCOUNTER — PATIENT OUTREACH (OUTPATIENT)
Dept: GASTROENTEROLOGY | Facility: CLINIC | Age: 63
End: 2023-08-02

## 2023-08-02 VITALS — HEIGHT: 62 IN | BODY MASS INDEX: 21.16 KG/M2 | WEIGHT: 115 LBS

## 2023-08-02 DIAGNOSIS — R19.7 DIARRHEA, UNSPECIFIED TYPE: ICD-10-CM

## 2023-08-02 DIAGNOSIS — K31.84 GASTROPARESIS: Primary | ICD-10-CM

## 2023-08-02 DIAGNOSIS — K90.9 DIARRHEA DUE TO MALABSORPTION: ICD-10-CM

## 2023-08-02 DIAGNOSIS — R19.7 DIARRHEA DUE TO MALABSORPTION: ICD-10-CM

## 2023-08-02 PROCEDURE — 99215 OFFICE O/P EST HI 40 MIN: CPT | Mod: VID | Performed by: INTERNAL MEDICINE

## 2023-08-02 ASSESSMENT — PAIN SCALES - GENERAL: PAINLEVEL: NO PAIN (0)

## 2023-08-02 NOTE — TELEPHONE ENCOUNTER
Called pt to follow up from clinic with Dr Navarro today. Left VM    Will schedule G-POEM on 10/2, pt needing HD on T/Th/Sat so wanting to make sure that was possible the next day. Confirmed with Dr Navarro that there is no additional orders needed for this.     Procedure/Imaging/Clinic: EGD with G-POEM, possible GJ tube exchange   Physician: Ramon   Timing: Next avail   Scope time needed: 45 min   Anesthesia:Gen   Dx: Gastroparesis   Tier: 3   Location: [facility]   Header of letter for pt communication: EGD with G-POEM         Observation admission overnight     Message routed to OR , will send [employer] confirmation.    1630  Pt returned call to discuss procedure plans for 10/2, also had questions about the SIBO test which I was unable to answer. Did mention option of take home breath test and she was interested in learning more. Message routed to RIYA Rodrigez, to discuss this option in more detail with patient.      Explained that she would be admitted following G POEM     Patient needs to get pre-op physical completed. If outside  health system will need physical faxed to number [phone]   If you do not get a preop physical, your procedure could be cancelled, patient voiced understanding*     Preop Plan: Dr Berrios, [facility], will make that appointment within 30 days     Med Review     Blood thinner -  none  ASA - none  Diabetic - short acting insulin       Patient Education r/t procedure: mychart     Does patient have any history of gastric bypass/gastric surgery/altered panc/bili anatomy? none     A pre-op nurse will call 1-2 days prior to the procedure.     NPO/Prep:   Discussed 48 hours of liquid diet for gastroparesis diagnosis. Also 8 hours strict NPO, including tube feeds, prior to procedure start time. Education via [employer] to be sent to include this information.     Verbalized understanding of all instructions. All questions answered.      Procedure order placed, message routed to OR       Penny Vogel, RN, BSN,   [facility]  Care coordinator

## 2023-08-02 NOTE — LETTER
8/2/2023         RE: Sofie Rodriguez  [address] [place] [address]        Dear Colleague,    Thank you for referring your patient, Sofie Rodriguez, to the [facility]. Please see a copy of my visit note below.      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 Diff, h/o EBV viremia, ESRD on dialysis, hemobilia s/p ERCP presumably due to hemorrhage into gallbladder, initially referred for gastroparesis s/p PEGJ placement but has since been having chronic diarrhea and J-tube feeding intolerance.     #Chronic Osmotic Diarrhea  #Gas/Bloating  #J-tube feeding intolerance  #Weight loss  Her inpatient work up was consistent with an osmotic diarrhea (negative colonoscopy and infectious work up). This would suggest a malabsorptive process and with the associated gas/bloating symptoms and small bowel dysmotility suggested by J-tube feeding intolerance, SIBO is highly suspicious. We'll obtain breath testing for this but I'll sent a prescription for Rifaximin 550 mg TID x 2 weeks to be started after testing. Fortunately, she has been able to slowly ramp up her tube feed rate and gain some weight recently. In the interim, she can easily increase the dose of her loperamide to 8x a day from her current 3x a day. I instructed her to increase by one pill and monitor response over the next couple of days before increasing again.     #Gastroparesis  Botox has worn off and she is symptomatic with little PO intake. Resolving the gastroparesis won't resolve the above symptoms however but we can try to tackle both at the same time.     Slightly complicating the diagnosis of true  post-surgical/vagal injury gastroparesis is the identification of a pyloric channel ulcer which creates gastric outlet obstruction through a separate mechanism although on her recent EGD that did not appear to be the case.     We discussed the G-POEM procedure in detail and reviewed what we currently know in terms of outcomes.    Donal Presley et al.  Endoscopic pyloromyotomy for the treatment of severe and refractory gastroparesis: a , randomised, sham-controlled trial.  Gut vol. 71,11 (2022): 9487-8962.  Clinical treatment success rate was 71% after G-POEM versus 22% after sham (p=0.005).   Subgroups:  Diabetics 89% vs 17%  Postsurgical 50% vs 29%  Idiopathic 67% vs 20%    Median gastric retention at 4 hours decreased from 22% to 12% after G-POEM and did not change after sham: 26% versus 24%.    There does appear to be a drop off in clinical efficacy over time (recurrence of symptoms), but efficacy still remains reasonably high.     Sheela Antonio et al.  Long-term Outcome of Gastric Per-Oral Endoscopic Pyloromyotomy in Treatment of Gastroparesis.  Clinical gastroenterology and hepatology : the official clinical practice journal of the American Gastroenterological Association vol. 19,4 (2021): 816-824.    Gordo Sharma et al.  Gastric peroral endoscopic myotomy outcomes after 4 years of follow-up in a large cohort of patients with refractory gastroparesis (with video).  Gastrointestinal endoscopy vol. 96,3 (2022): 487-499.    We discussed the risks of the procedure which include bleeding, gastric leak/perforation, and ulceration at the mucosotomy site. After the procedure, I recommend overnight observation with an UGIS the following day to rule out leak and BID PPI for 30 days. I also recommend liquid diet prior to procedure and slow advancement of diet over the course of a week following the procedure.    We'll time it such that the breath test and Rifaximin course is prior to the  G-POEM procedure and we can assess response at that time.    RECOMMENDATIONS:  - SIBO breath testing  - Rifaximin 550 mg TID x 2 week after Breath test  - EGD with G-POEM to follow    Gonzalez Navarro MD  [facility]  Division of Gastroenterology and Hepatology  [facility] [address] [place], [place] [address]    ________________________________________________________________  HPI:  Sofie is a 63 year old female with a PMHx of end stage COPD s/p bilateral lung transplant on 6/28/22 complicated by acute limb ischemia of LUE s/p left radial thrombectomy, h/o C. Diff, h/o EBV viremia, ESRD on dialysis, hemobilia s/p ERCP presumably due to hemorrhage into gallbladder, initially referred for gastroparesis s/p PEGJ placement but has since been having chronic diarrhea and J-tube feeding intolerance.     PRIOR History:  She essentially developed gastroparesis following her lung transplant and underwent PEGJ placement by IR on 7/27/22. But also of note is development of a pyloric channel ulcer seen on an EGD on 8/11/22. She's been on BID PPI since then. Most recent gastric emptying study on 1/2/2023 shows persistent delayed emptying of 75% at 240 min. She continues on J-tube feeds but does eat sometimes for comfort. Eating can make her symptomatic with nausea, bloating/gas. She will periodically vent her G-tube about once a week, sometimes more frequently if she's been eating, when she has symptoms of bloating/gas and distension which helps. She has never been on Reglan.     She underwent EGD with pyloric Botox injection pm 1/25/23. She noticed an improvement following the procedure although still some residual symptoms. She thinks she is eating more by mouth now and venting less. She continues on her J-tube feeds. Certainly less nausea and bloating.    1/25/23 GCSI= 3,0,0,3,2,2,3,4,4=21   2/24/23 GCSI= 1,0,0,3,1,1,2,2,2=12    Interval History:  She was admitted locally in [place] in  March for progressively worsening chronic diarrhea and then again in May at [facility]. Concern was for a recurrent C. Diff infection. Colonoscopy in March was normal including ileal, colonic biopsies, and staining for CMV. Her C. Diff PCR was positive on 5/8/23 and she was treated with PO Vancomycin without improvement. C. Diff was potentially a colonizer. She was having up to 10 watery/mushy bowel movements a day. She is now having 6-8 watery/mushy stools a day. They tend to be at night. This is despite Imodium 2 mg TID. They resolved when TF were held in the hospital. Stool testing showed an osmotic cause for her diarrhea. She reports significant gas and bloating as well    She currently takes very little PO before becoming nauseous. She was transitioned to continuous tube feeds but higher rates caused her to be nauseous and for a long time was stuck at 20 ml/hr (goal 40 ml/hr). She recently was able to ramp up to 35 ml/hr. She had been losing weight and was down to 111 lbs. Today she reports being up to 115 lbs. No abdominal pain or fevers.     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    Hepatitis 2017    Hep C, [facility]    HTN (hypertension)     Lung infection 11/30/2022    Osteopenia      Patient Active Problem List   Diagnosis    COPD (chronic obstructive pulmonary disease) (H)    Abnormal findings on diagnostic imaging of cardiovascular system    Status post coronary angiogram    Hepatitis C, chronic (H)    S/P lung transplant (H)    Acute post-operative pain    Immunosuppressed status (H)    Acute kidney failure with tubular necrosis (H)    Thrombus of left radial artery (H)    Diaphragm dysfunction    Paroxysmal A-fib (H)    Administration of long-term prophylactic antibiotics    EBV (Vibha-Barr virus) viremia    Acute pylorus ulcer     Hypogammaglobulinemia (H)    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 difficile colitis    Leukopenia, unspecified type       PSurgHx:  Past Surgical History:   Procedure Laterality Date    BRONCHOSCOPY (RIGID OR FLEXIBLE), DIAGNOSTIC N/A 8/2/2022    Procedure: BRONCHOSCOPY, DIAGNOSTIC- inspection Bronch;  Surgeon: Kamala Lovell MD;  Location: [facility]    BRONCHOSCOPY (RIGID OR FLEXIBLE), DIAGNOSTIC N/A 9/13/2022    Procedure: INSPECTION BRONCHOSCOPY, WITH BRONCHOALVEOLAR LAVAGE;  Surgeon: Jose R Mccullough MD;  Location: [facility]    BRONCHOSCOPY (RIGID OR FLEXIBLE), DIAGNOSTIC N/A 11/9/2022    Procedure: BRONCHOSCOPY, WITH BRONCHOALVEOLAR LAVAGE AND BIOPSY;  Surgeon: Cesar Lima MD;  [facility]: [facility]    BRONCHOSCOPY (RIGID OR FLEXIBLE), DIAGNOSTIC N/A 1/25/2023    Procedure: BRONCHOSCOPY, WITH BRONCHOALVEOLAR LAVAGE AND BIOPSY;  Surgeon: Mason Reddy MD;  Location: [facility]    BRONCHOSCOPY (RIGID OR FLEXIBLE), DIAGNOSTIC N/A 4/19/2023    Procedure: BRONCHOSCOPY, WITH BRONCHOALVEOLAR LAVAGE AND BIOPSY;  Surgeon: Kamala Lovell MD;  [facility]: [facility]    BRONCHOSCOPY (RIGID OR FLEXIBLE), DIAGNOSTIC N/A 7/12/2023    Procedure: BRONCHOSCOPY, WITH BRONCHOALVEOLAR LAVAGE AND BIOPSY;  Surgeon: Cesar Lima MD;  Location: [facility]    BRONCHOSCOPY FLEXIBLE AND RIGID N/A 07/19/2022    Procedure: BRONCHOSCOPY inspection only;  Surgeon: Bob Liao MD;  Location: [facility]    COLONOSCOPY  2015    CV CORONARY ANGIOGRAM N/A 06/30/2021    Procedure: CV CORONARY ANGIOGRAM;  Surgeon: Alexander Cuellar  MD;  Location:  HEART CARDIAC CATH LAB    CV RIGHT HEART CATH MEASUREMENTS RECORDED N/A 06/30/2021    Procedure: CV RIGHT HEART CATH;  Surgeon: Alexander Cuellar MD;  Location:  HEART CARDIAC CATH LAB    ENDOSCOPIC RETROGRADE CHOLANGIOPANCREATOGRAM N/A 8/11/2022    Procedure: ENDOSCOPIC RETROGRADE CHOLANGIOPANCREATOGRAPHY WITH PANCREATIC DUCT NEEDLE KNIFE AND STENT PLACEMENT, BILE DUCT SPHINCTEROTOMY, BLOOD/DEBRIS REMOVAL AND STENT PLACEMENT;  Surgeon: Cosmo Arroyo MD;  [facility]    ENDOSCOPIC RETROGRADE CHOLANGIOPANCREATOGRAM N/A 10/7/2022    Procedure: ENDOSCOPIC RETROGRADE CHOLANGIOPANCREATOGRAPHY with biliary and pancreatic stent removal, debris removal;  Surgeon: Cosmo Arroyo MD;  Location:  OR    ENT SURGERY  1974    tonsillectomy    ENTEROSCOPY SMALL BOWEL N/A 8/11/2022    Procedure: SMALL BOWEL ENTEROSCOPY;  Surgeon: Cosmo Arroyo MD;  [facility]    ESOPHAGOGASTRODUODENOSCOPY, WITH NASOGASTRIC TUBE INSERTION N/A 07/01/2022    Procedure: ESOPHAGOGASTRODUODENOSCOPY, WITH NASOJEJUNAL TUBE INSERTION;  Surgeon: Ozzy Nickerson MD;  Location:  GI    ESOPHAGOSCOPY, GASTROSCOPY, DUODENOSCOPY (EGD), COMBINED N/A 8/3/2022    Procedure: ESOPHAGOGASTRODUODENOSCOPY (EGD);  Surgeon: Ira Andres MD;  Location:  GI    ESOPHAGOSCOPY, GASTROSCOPY, DUODENOSCOPY (EGD), COMBINED N/A 1/25/2023    Procedure: ESOPHAGOGASTRODUODENOSCOPY (EGD) with botox injection;  Surgeon: Gonzalez Navarro MD;  Location:  GI    HAND SURGERY      INSERT CHEST TUBE Right 9/13/2022    Procedure: Insert chest tube;  Surgeon: Jose R Mccullough MD;  Location:  GI    IR CVC TUNNEL PLACEMENT > 5 YRS OF AGE  9/26/2022    IR GASTRO JEJUNOSTOMY TUBE CHANGE  8/31/2022    IR GASTRO JEJUNOSTOMY TUBE CHANGE  12/21/2022    IR GASTRO JEJUNOSTOMY TUBE CHANGE  7/12/2023    IR GASTRO JEJUNOSTOMY TUBE PLACEMENT  7/27/2022    IR THORACENTESIS  8/29/2022    LEEP TX, CERVICAL  04/07/2017    HECTOR III     LYMPH NODE BIOPSY Left 2005    Left axilla, benign- [place]    MIDLINE INSERTION - DOUBLE LUMEN Left 07/28/2022    20cm, Basilic vein    REPLACE GASTROJEJUNOSTOMY TUBE, PERCUTANEOUS  10/7/2022    Procedure: Replace Gastrojejunostomy Tube;  Surgeon: Cosmo Arroyo MD;  [facility]    THORACENTESIS Left 8/29/2022    Procedure: THORACENTESIS;  Surgeon: Bo Capone PA-C;  Location: [facility] OR    THORACENTESIS Left 9/13/2022    Procedure: Thoracentesis;  Surgeon: Jose R Mccullough MD;  [facility]: [facility]    THROMBECTOMY UPPER EXTREMITY Left 07/02/2022    Procedure: LEFT RADIAL ARM THROMBECTOMY;  Surgeon: Christie Graham MD;  [facility]    TRANSPLANT LUNG RECIPIENT SINGLE X2 Bilateral 06/28/2022    Procedure: Clamshell Incision, Bilateral Sequential Lung Transplant, On Cardiopulmonary Bypass, Flexible Bronchoscopy;  Surgeon: Sue Sunshine MD;  Location: [facility]       MEDS:  Current Outpatient Medications   Medication    alcohol swab prep pads    azaTHIOprine (IMURAN) 5 mg/mL SUSP    blood glucose (CONTOUR NEXT TEST) test strip    blood glucose (NO BRAND SPECIFIED) lancets standard    blood glucose monitoring (CONTOUR NEXT MONITOR W/DEVICE KIT) meter device kit    Calcium Carbonate-Vitamin D 600-10 MG-MCG TABS    cyanocobalamin (CYANOCOBALAMIN) 500 MCG tablet    dapsone 2 mg/mL SUSP    insulin pen needle (32G X 4 MM) 32G X 4 MM miscellaneous    loperamide (IMODIUM A-D) 2 MG tablet    metoprolol tartrate (LOPRESSOR) 50 MG tablet    Nutritional Supplements (GLUCOSE MANAGEMENT) TABS    pantoprazole (PROTONIX) 2 mg/mL SUSP suspension    predniSONE (DELTASONE) 5 MG tablet    protein modular (PROSOURCE TF) LIQD    sevelamer carbonate, RENVELA, 0.8 GM PACK Packet    Sharps Container MISC    tacrolimus (GENERIC EQUIVALENT) 1 mg/mL suspension     No current facility-administered medications for this visit.     ALLERGIES:    Allergies   Allergen Reactions    Blood Transfusion Related  "(Informational Only) Other (See Comments)     Patient has a history of a clinically significant antibody against RBC antigens.  A delay in compatible RBCs may occur.      FHx: Noncontributory    SOCIAL Hx:  Social History     Socioeconomic History    Marital status: Single     Spouse name: Not on file    Number of children: Not on file    Years of education: Not on file    Highest education level: Not on file   Occupational History    Not on file   Tobacco Use    Smoking status: Former     Years: 30.00     Types: Cigarettes     Quit date: 2020     Years since quittin.7    Smokeless tobacco: Never   Substance and Sexual Activity    Alcohol use: Not Currently    Drug use: Not Currently     Types: Marijuana, Methamphetamines     Comment: hx:marijuana and methamphetamine-quit both unsure ?  2-3 yrs ago    Sexual activity: Not on file   Other Topics Concern    Parent/sibling w/ CABG, MI or angioplasty before 65F 55M? Not Asked   Social History Narrative    Not on file     Social Determinants of Health     Financial Resource Strain: Not on file   Food Insecurity: Not on file   Transportation Needs: Not on file   Physical Activity: Not on file   Stress: Not on file   Social Connections: Not on file   Intimate Partner Violence: Not on file   Housing Stability: Not on file       ROS: A comprehensive Review of Systems was asked and answered in the negative unless specifically commented upon in the HPI    Physical Exam  Ht 1.575 m (5' 2\")   Wt 52.2 kg (115 lb)   BMI 21.03 kg/m    Body mass index is 21.03 kg/m .  Gen: A&Ox3, NAD  HEENT: Moist mucus membranes, no scleral icterus.  Lungs: no respiratory distress      LABS:  External Order Results on 2022   Component Date Value Ref Range Status    Sodium (External) 2022 137  136 - 146 mmol/L Final    Potassium (External) 2022 4.1  3.5 - 5.1 mmol/L Final    Chloride (External) (External) 2022 97 (L)  98 - 107 mmol/L Final    CO2 (External) " 12/30/2022 27  22 - 29 mmol/L Final    Anion Gap (External) 12/30/2022 17.1  10.0 - 20.0 mmol/L Final    Creatinine (External) 12/30/2022 4.34  mg/dL Final    Urea Nitrogen (External) 12/30/2022 43.6 (H)  10.0 - 20.0 mg/dL Final    BUN/Creatinine Ratio (External) 12/30/2022 10.05 (L)  11.70 - 22.90 ratio Final    Calcium (External) 12/30/2022 9.5  8.6 - 10.5 mg/dL Final    Glucose (External) 12/30/2022 102 (H)  70 - 100 mg/dL Final    GFR Estimated (External) 12/30/2022 11 (L)  >=60 ml/min/1.73m2 Final    Magnesium (External) 12/30/2022 2.5  1.8 - 2.6 mg/dL Final    WBC Count (External) 12/30/2022 4.7  10(3)/uL Final    RBC Count (External) 12/30/2022 2.81  10(6)/uL Final    Hemoglobin (External) 12/30/2022 10.3  g/dL Final    Hematocrit (External) 12/30/2022 31.1  % Final    MCV (External) 12/30/2022 110.7  fL Final    MCH (External) 12/30/2022 36.5  pg Final    MCHC (External) 12/30/2022 33.0  32.0 - 36.0 g/dL Final    RDW (External) 12/30/2022 16.2  % Final    Platelet Count (External) 12/30/2022 251  179 - 450 10(3)/uL Final         IMAGING:  EXAM:  NM GASTRIC EMPTYING, 1/2/2023 1:21 PM  HISTORY: Gastroparesis; Lung replaced by transplant (H)     TECHNIQUE: The patient ingested 2.1 mCi of Tc-99m sulfur colloid in 2  eggs, 2 pieces of toast w/ jelly. Static images were acquired at  approximately 1 hour intervals out through 4 hours using a dual head  gamma camera in an anterior and posterior position. The calculation of  emptying was based on dual head imaging with geometric mean  calculations.  A Linear square fit was calculated to the emptying  curve to determine the amount of residual activity at each time point.     FINDINGS:   Percent retention at 60 min = 87%.  Percent retention at 120 min = 77%.  Percent retention at 180 min = 77%.  Percent retention at 240 min = 75%.     T 1/2 emptying time =  238 mins.                                                                      IMPRESSION: Delayed gastric  emptying    EXAM:  NM GASTRIC EMPTYING, 9/30/2022 12:32 PM     HISTORY: severe delayed gastric emptying for follow up; is off  dialysis. NPO weds night     TECHNIQUE: The patient ingested 2.3 mCi of Tc-99m sulfur colloid in in  2 eggs, 1 slices of toast with jelly, and a glass of water. Static  images were acquired at approximately 1 hour intervals out through 4  hours using a dual head gamma camera in an anterior and posterior  position. The calculation of emptying was based on dual head imaging  with geometric mean calculations.  A Linear square fit was calculated  to the emptying curve to determine the amount of residual activity at  each time point.     FINDINGS:   Percent retention at 60 min = 97%.  Percent retention at 120 min = 95%.  Percent retention at 180 min = 91%.  Percent retention at 240 min = 91%.     T 1/2 emptying time =  Too long to calculate.                                                                      IMPRESSION: Severe delayed gastric emptying    Endoscopies:  Upper GI Endoscopy 08/03/2022 11:49 AM [address] [address] [phone]     Endoscopy Department   _______________________________________________________________________________   Patient Name: Sofie Rodriguez            Procedure Date: 8/3/2022 11:49 AM   MRN: [id number]                       Account Number: [id number]   YOB: 1960               Admit Type: Inpatient   Age: 62                               Room: [place] [address]   Gender: Female                        Note Status: Finalized   Attending MD: GREGORIO CARSON MD Pause for the Cause: pause for    cause done   Total Sedation Time: 16 minutes         _______________________________________________________________________________       Procedure:             Upper GI endoscopy   Indications:           Coffee-ground emesis   Providers:             GREGORIO CARSON MD,  NELSON FINE, Josefina Marks RN, Lashonda Murrell RN   Patient Profile:       Ms. Jimenez is a 62 year old female with end stage COPD                          s/p bilateral lung transplant (6/28/22) complicated by                          acute limb ischemia of LUE requiring left radial                          thrombectomy, EBV viremia and C.diff (vancomycin                          7/12/22-7/28/22). She was found to have delayed                          gastric emptying on GES and underwent percutaneous GJ                          tube placement by IR on 7/27/22. GI consulted for                          development of dark G tube output concerning for upper                          GI bleed.   Referring MD:             Medicines:             Midazolam 3 mg IV, Fentanyl 100 micrograms IV   Complications:         No immediate complications.   _______________________________________________________________________________   Procedure:             Pre-Anesthesia Assessment:                          - Prior to the procedure, a History and Physical was                          performed, and patient medications and allergies were                          reviewed. The patient is competent. The risks and                          benefits of the procedure and the sedation options and                          risks were discussed with the patient. All questions                          were answered and informed consent was obtained.                          Patient identification and proposed procedure were                          verified by the physician and the nurse in the                          procedure room. Mental Status Examination: normal.                          Airway Examination: normal oropharyngeal airway and                          neck mobility and Mallampati Class III (part of the                          uvula and soft palate visualized). Respiratory                           Examination: clear to auscultation. CV Examination:                          normal. Prophylactic Antibiotics: The patient does not                          require prophylactic antibiotics. Prior                          Anticoagulants: The patient has taken no anticoagulant                          or antiplatelet agents. ASA Grade Assessment: III - A                          patient with severe systemic disease. After reviewing                          the risks and benefits, the patient was deemed in                          satisfactory condition to undergo the procedure. The                          anesthesia plan was to use moderate sedation /                          analgesia (conscious sedation). Immediately prior to                          administration of medications, the patient was                          re-assessed for adequacy to receive sedatives. The                          heart rate, respiratory rate, oxygen saturations,                          blood pressure, adequacy of pulmonary ventilation, and                          response to care were monitored throughout the                          procedure. The physical status of the patient was                          re-assessed after the procedure.                          After obtaining informed consent, the endoscope was                          passed under direct vision. Throughout the procedure,                          the patient's blood pressure, pulse, and oxygen                          saturations were monitored continuously. The Endoscope                          was introduced through the mouth, and advanced to the                          second part of duodenum. The upper GI endoscopy was                          accomplished without difficulty. The patient tolerated                          the procedure well.                                                                                     Findings:        The examined  esophagus was normal.        The Z-line was regular and was found 40 cm from the incisors.        Excessive fluid and solid food pieces were found in the gastric fundus        and body. This could not be cleared.        One non-bleeding superficial ulcer at the pyloric channel with no        stigmata of bleeding was found at the pylorus. The lesion was 7 mm in        largest dimension. There was inflammation (edema, erythema) surrounding        the ulcer.        There was evidence of an intact gastrostomy present on the anterior wall        of the stomach with J tube extension traversing the pylorus. The area        around gastric feeding tube was inspected with mild irritation and        erythema under the balloon (not unexpected with recent feeding tube        placement) but no ulcer or stigmata of bleeding.        Given the presence of feeding tube extending through the pylorus as well        as ulcer-related inflammation leading to some pyloric channel narrowing,        it did take some additional pressure to traverse the pylous, but the        adult endoscope was able to pass.        The duodenal bulb, first portion of the duodenum and second portion of        the duodenum were normal. J tube visualized in duodenum.                                                                                     Moderate Sedation:        Moderate (conscious) sedation was administered by the endoscopy nurse        and supervised by the endoscopist. The patient's oxygen saturation,        heart rate, blood pressure and response to care were monitored. Total        physician intraservice time was 16 minutes.   Impression:            Clean-based ulcer at pyloric channel is suspected                          source of bleeding. See above Findings for further                          details.                          - Normal esophagus.                          - Z-line regular, 40 cm from the incisors.                          -  Excessive gastric fluid and residual food.                          - Non-bleeding ulcer with surrounding inflammation at                          the pyloric channel with no stigmata of bleeding.                          - Intact gastrostomy with J-extension passing through                          pylorus.                          - Some increased pressure required to pass adult                          endoscope through pylorus in setting of inflammation                          and presence of J-tube.                          - Normal duodenal bulb, first portion of the duodenum                          and second portion of the duodenum.                          - No specimens collected.   Recommendation:        - Return patient to [facility] for ongoing care.                          - Strict NPO to allow time for stomach to empty given                          large residual gastric contents. Would avoid                          prokinetics at this time given concern for pyloric                          channel narrowing 2/2 inflammation.                          - Vent G tube to allow drainage of fluid/gas.                          - Continue pantoprazole 40 mg IV BID                          - Okay to resume anticoagulation tomorrow.                          - Repeat upper endoscopy in 8 weeks to check healing                          of ulcer. Pending clinical picture and endoscopic                          findings may need to consider pyloric dilation if                          stricture develops from current inflammation.                          - Disucssed findings with patient                          - Discussed findings and recommendations with primary                          medication team as well as pulmonary team taking care                          of patient.                                                                                       Ira Andres MD      Upper GI  Endoscopy 01/25/2023 11:27 AM [address] [place] [address] [phone]     Endoscopy Department   _______________________________________________________________________________   Patient Name: Sofie Rodriguez            Procedure Date: 1/25/2023 11:27 AM   MRN: [id number]                       Account Number: [id number]   YOB: 1960               Admit Type: Outpatient   Age: 62                               Room: [place] [address]   Gender: Female                        Note Status: Finalized   Attending MD: OSKAR PÉREZ MD       Pause for the Cause: time out performed   Total Sedation Time:                     _______________________________________________________________________________       Procedure:             Upper GI endoscopy   Indications:           For therapy of gastroparesis; 62 year old female with                          a PMHx of end stage COPD s/p bilateral lung transplant                          on 6/28/22 complicated by acute limb ischemia of LUE                           s/p left radial thrombectomy, h/o C. Diff, h/o EBV                          viremia, ESRD on dialysis, hemobilia s/p ERCP                          presumably due to hemorrhage into gallbladder,                          referred for gastroparesis s/p PEGJ placement. History                          notable for a pyloric channel ulcer. Plan for a trial                          of Botox. GCSI= 3,0,0,3,2,2,3,4,4=21   Providers:             OSKAR PÉREZ MD, Jana Weller RN   Referring MD:             Requesting Provider:   MICHELE SOSA MD   Medicines:             Fentanyl 150 micrograms IV, Midazolam 4 mg IV   Complications:         No immediate complications. Estimated blood loss:                          Minimal.   _______________________________________________________________________________   Procedure:             Pre-Anesthesia  Assessment:                          - Prior to the procedure, a History and Physical was                          performed, and patient medications and allergies were                          reviewed. The patient is competent. The risks and                          benefits of the procedure and the sedation options and                          risks were discussed with the patient. All questions                          were answered and informed consent was obtained.                          Patient identification and proposed procedure were                          verified by the physician and the nurse in the                          procedure room. Mental Status Examination: alert and                          oriented. Airway Examination: normal oropharyngeal                          airway and neck mobility. Respiratory Examination:                          clear to auscultation. CV Examination: normal.                          Prophylactic Antibiotics: The patient does not require                          prophylactic antibiotics. Prior Anticoagulants: The                          patient has taken no anticoagulant or antiplatelet                          agents. ASA Grade Assessment: III - A patient with                          severe systemic disease. After reviewing the risks and                          benefits, the patient was deemed in satisfactory                          condition to undergo the procedure. The anesthesia                          plan was to use moderate sedation / analgesia                          (conscious sedation). Immediately prior to                          administration of medications, the patient was                          re-assessed for adequacy to receive sedatives. The                          heart rate, respiratory rate, oxygen saturations,                          blood pressure, adequacy of pulmonary ventilation, and                          response to care  were monitored throughout the                          procedure. The physical status of the patient was                          re-assessed after the procedure.                          After obtaining informed consent, the endoscope was                          passed under direct vision. Throughout the procedure,                          the patient's blood pressure, pulse, and oxygen                          saturations were monitored continuously. The Endoscope                          was introduced through the mouth, and advanced to the                          second part of duodenum. The upper GI endoscopy was                          accomplished without difficulty. The patient tolerated                          the procedure well.                                                                                     Findings:        The esophagus was normal.        There was evidence of an intact gastrostomy with a patent GJ-tube        present in the gastric antrum extending into the duodenum. This was        characterized by healthy appearing mucosa.        Localized nodular mucosa was found at the pylorus at the 11 o'clock        position corresponding to the location of her previously noted pyloric        channel ulcer.        Pylorus was otherwise normal. Pylorus was successfully injected with 200        units botulinum toxin divided into four quadrants in a total of 4 mL.        Estimated blood loss was minimal.        The examined duodenum was normal.                                                                                     Moderate Sedation:        Moderate (conscious) sedation was administered by the endoscopy nurse        and supervised by the endoscopist. The patient's oxygen saturation,        heart rate, blood pressure and response to care were monitored. Total        physician intraservice time was 10 minutes.   Impression:            - Normal esophagus.                          -  PEGJ tube in place                          - Nodular mucosa at the pylorus at the 11 o'clock                          position corresponding to the location of her                          previously noted pyloric channel ulcer consistent with                          granulation tissue. Ulcer no longer present                          - Pylorus injected with 200 units of Botox                          - Normal examined duodenum.                          - Today's GCSI= 3,0,0,3,2,2,3,4,4=21                          - No specimens collected.   Recommendation:        - Discharge patient to home (ambulatory).                          - Resume diet as tolerated today                          - GJ tube may be used immediately as before                          - In the future, G-POEM may still be possible along                          the 5 o'clock postion of the pylorus but may still run                          into significant scar tissue from prior ulceration.                          - Return to clinic in 1 month with Dr. Navarro to assess                          response.                                                                                       Gonzalez Navarro MD      [facility]   Patient Name: Sofie Rodriguez   Procedure Date: 3/9/2023 2:54 PM   MRN: [id number]   YOB: 1960   Admit Type: Inpatient   Age: 62   Room: [facility]   Gender: Female   Note Status: Finalized   Attending MD: Ketan Boston MD,   Instrument Name: [id number]   Procedure:             Colonoscopy   Indications:           Chronic diarrhea in immunosupressed patient.   Providers:             Ketan Boston MD   Referring Provider:    Vinny Berrios MD (Referring MD)   Medicines:             Monitored Anesthesia Care   Complications:         No immediate complications.     Procedure:             After I obtained informed consent, the scope was                          passed  under direct vision. Throughout the procedure,                          the patient's blood pressure, pulse, and oxygen                          saturations were monitored continuously. The                          Colonoscope was introduced through the anus and                          advanced to the terminal ileum. After I obtained                          informed consent, the scope was passed under direct                          vision. Throughout the procedure, the patient's blood                          pressure, pulse, and oxygen saturations were monitored                          continuously.The colonoscopy was performed without                          difficulty. The patient tolerated the procedure well.                          The quality of the bowel preparation was evaluated                          using the BBPS ([place] Bowel Preparation Scale) with                          scores of: Right Colon = 3, Transverse Colon = 3 and                          Left Colon = 3 (entire mucosa seen well with no                          residual staining, small fragments of stool or opaque                          liquid). The total BBPS score equals 9.     Findings:       The perianal and digital rectal examinations were normal. Pertinent        negatives include normal sphincter tone, no palpable rectal lesions and        no anal lesion or abnormality.        The colon (entire examined portion) appeared normal. Biopsies were taken        with a cold forceps for histology to check for chronic diarrhea,        especially CMV.        Many diverticula were found in the sigmoid colon and descending colon.        A 4 to 8 mm polyp was found in the ascending colon. The polyp was        sessile. The polyp was removed with a cold snare. Resection and        retrieval were complete. Patient bleeds easily and stops spontaneously.        However one clip placed in one polypectomy site b/c of continuous        bleeding.  No bleeding at the endf o the procedure.        The terminal ileum appeared normal. Biopsies were taken with a cold        forceps for histology.     Impression:            - The entire examined colon is normal. Biopsied.                          - Diverticulosis in the sigmoid colon and in the                          descending colon.                          - One 4 to 8 mm polyp in the ascending colon, removed                          with a cold snare. Resected and retrieved.                          - The examined portion of the ileum was normal.                          Biopsied.     Recommendation:        - Await pathology results.                          - Advance diet as tolerated.                          - Repeat colonoscopy in 3y.   Ketan Boston MD     Final Diagnosis    A. TERMINAL ILEUM, BIOPSY  --ILEAL MUCOSA WITH NO DIAGNOSTIC ABNORMALITY     B. COLON, ASCENDING/RIGHT, BIOPSY  --FRAGMENTS OF TUBULAR ADENOMA     C. COLON, RANDOM, BIOPSY  --COLONIC MUCOSA WITH NO DIAGNOSTIC ABNORMALITY  --IMMUNOHISTOCHEMICAL STAIN FOR CMV IS NEGATIVE         Again, thank you for allowing me to participate in the care of your patient.      Sincerely,    Gonzalez Navarro MD

## 2023-08-02 NOTE — NURSING NOTE
Is the patient currently in the state of [place]? YES    Visit mode:VIDEO    If the visit is dropped, the patient can be reconnected by: VIDEO VISIT: Text to cell phone: [phone]    Will anyone else be joining the visit? NO      How would you like to obtain your AVS? MyChart    Are changes needed to the allergy or medication list? NO    Reason for visit: JULIO WINKLER, VF

## 2023-08-02 NOTE — PATIENT INSTRUCTIONS
You will find a brief summary of your discussion and care plan from today's visit below.  Dr Navarro has outlined the following steps after your recent clinic visit:    RECOMMENDATIONS:  - SIBO breath testing scheduled for Sept 1st, 2023  - Rifaximin 550 mg TID x 2 week after Breath test  - EGD with G-POEM to follow on October 2nd, 2023    Please call with any questions or concerns regarding your clinic visit today.     It is a pleasure being involved in your health care.     Contacts post-consultation depending on your need:     Schedule Clinic Appointments                        [phone], option 1    Teresa Vogel, RIYA Care Coordinator           [phone]     Jalen Jackson, [place]                           [phone]     GI Procedure Scheduling                               [phone], option 2     For urgent/emergent questions after business hours, you may reach the on-call GI Fellow by contacting the [facility]  at [phone].    How to I schedule a follow-up visit?  If you did not schedule a follow-up visit today, please call [phone] option #1 to schedule a follow-up office visit.       How do I schedule labs, imaging studies, or procedures that were ordered in clinic today?      Labs: To schedule lab appointment at the [facility], use my chart or call [phone]. If you have a [facility] lab closer to home where you are regularly seen you can give them a call.      Procedures: If a colonoscopy, upper endoscopy, breath test, esophageal manometry, or pH impedence was ordered today, our endoscopy team will call you to schedule this. If you have not heard from our endoscopy team within a week, please call [phone] to schedule.      Imaging Studies: If you were scheduled for a CT scan, X-ray, MRI, ultrasound, HIDA scan or other imaging study, please call [phone] to have this scheduled.      Referral: If a referral to another specialty was ordered,  expect a phone call or follow instructions above. If you have not heard from anyone regarding your referral in a week, please call our clinic to check the status.     I recommend signing up for [employer] access if you have not already done so and are comfortable with using a computer.  This allows for online access to your lab results and also helps you communicate efficiently with the clinic should any questions arise in your care.

## 2023-08-02 NOTE — PROGRESS NOTES
Virtual Visit Details    Type of service:  Video Visit   Video Start Time: 8:08 AM  Video End Time: 8:40 AM    Originating Location (pt. 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 Diff, h/o EBV viremia, ESRD on dialysis, hemobilia s/p ERCP presumably due to hemorrhage into gallbladder, initially referred for gastroparesis s/p PEGJ placement but has since been having chronic diarrhea and J-tube feeding intolerance.     #Chronic Osmotic Diarrhea  #Gas/Bloating  #J-tube feeding intolerance  #Weight loss  Her inpatient work up was consistent with an osmotic diarrhea (negative colonoscopy and infectious work up). This would suggest a malabsorptive process and with the associated gas/bloating symptoms and small bowel dysmotility suggested by J-tube feeding intolerance, SIBO is highly suspicious. We'll obtain breath testing for this but I'll sent a prescription for Rifaximin 550 mg TID x 2 weeks to be started after testing. Fortunately, she has been able to slowly ramp up her tube feed rate and gain some weight recently. In the interim, she can easily increase the dose of her loperamide to 8x a day from her current 3x a day. I instructed her to increase by one pill and monitor response over the next couple of days before increasing again.     #Gastroparesis  Botox has worn off and she is symptomatic with little PO intake. Resolving the gastroparesis won't resolve the above symptoms however but we can try to tackle both at the same time.     Slightly complicating the diagnosis of true  post-surgical/vagal injury gastroparesis is the identification of a pyloric channel ulcer which creates gastric outlet obstruction through a separate mechanism although on her recent EGD that did not appear to be the case.     We discussed the G-POEM procedure in detail and reviewed what we currently know in terms of outcomes.    Donal Presley et al.  Endoscopic pyloromyotomy for the treatment of severe and refractory gastroparesis: a , randomised, sham-controlled trial.  Gut vol. 71,11 (2022): 8289-6942.  Clinical treatment success rate was 71% after G-POEM versus 22% after sham (p=0.005).   Subgroups:  Diabetics 89% vs 17%  Postsurgical 50% vs 29%  Idiopathic 67% vs 20%    Median gastric retention at 4 hours decreased from 22% to 12% after G-POEM and did not change after sham: 26% versus 24%.    There does appear to be a drop off in clinical efficacy over time (recurrence of symptoms), but efficacy still remains reasonably high.     Sheela Antonio et al.  Long-term Outcome of Gastric Per-Oral Endoscopic Pyloromyotomy in Treatment of Gastroparesis.  Clinical gastroenterology and hepatology : the official clinical practice journal of the American Gastroenterological Association vol. 19,4 (2021): 816-824.    Gordo Sharma et al.  Gastric peroral endoscopic myotomy outcomes after 4 years of follow-up in a large cohort of patients with refractory gastroparesis (with video).  Gastrointestinal endoscopy vol. 96,3 (2022): 487-499.    We discussed the risks of the procedure which include bleeding, gastric leak/perforation, and ulceration at the mucosotomy site. After the procedure, I recommend overnight observation with an UGIS the following day to rule out leak and BID PPI for 30 days. I also recommend liquid diet prior to procedure and slow advancement of diet over the course of a week following the procedure.    We'll time it such that the breath test and Rifaximin course is prior to the  G-POEM procedure and we can assess response at that time.    RECOMMENDATIONS:  - SIBO breath testing  - Rifaximin 550 mg TID x 2 week after Breath test  - EGD with G-POEM to follow    Gonzalez Navarro MD  [facility]  Division of Gastroenterology and Hepatology  [facility] [address] [place], [place] [address]    ________________________________________________________________  HPI:  Sofie is a 63 year old female with a PMHx of end stage COPD s/p bilateral lung transplant on 6/28/22 complicated by acute limb ischemia of LUE s/p left radial thrombectomy, h/o C. Diff, h/o EBV viremia, ESRD on dialysis, hemobilia s/p ERCP presumably due to hemorrhage into gallbladder, initially referred for gastroparesis s/p PEGJ placement but has since been having chronic diarrhea and J-tube feeding intolerance.     PRIOR History:  She essentially developed gastroparesis following her lung transplant and underwent PEGJ placement by IR on 7/27/22. But also of note is development of a pyloric channel ulcer seen on an EGD on 8/11/22. She's been on BID PPI since then. Most recent gastric emptying study on 1/2/2023 shows persistent delayed emptying of 75% at 240 min. She continues on J-tube feeds but does eat sometimes for comfort. Eating can make her symptomatic with nausea, bloating/gas. She will periodically vent her G-tube about once a week, sometimes more frequently if she's been eating, when she has symptoms of bloating/gas and distension which helps. She has never been on Reglan.     She underwent EGD with pyloric Botox injection pm 1/25/23. She noticed an improvement following the procedure although still some residual symptoms. She thinks she is eating more by mouth now and venting less. She continues on her J-tube feeds. Certainly less nausea and bloating.    1/25/23 GCSI= 3,0,0,3,2,2,3,4,4=21   2/24/23 GCSI= 1,0,0,3,1,1,2,2,2=12    Interval History:  She was admitted locally in [place] in  March for progressively worsening chronic diarrhea and then again in May at [facility]. Concern was for a recurrent C. Diff infection. Colonoscopy in March was normal including ileal, colonic biopsies, and staining for CMV. Her C. Diff PCR was positive on 5/8/23 and she was treated with PO Vancomycin without improvement. C. Diff was potentially a colonizer. She was having up to 10 watery/mushy bowel movements a day. She is now having 6-8 watery/mushy stools a day. They tend to be at night. This is despite Imodium 2 mg TID. They resolved when TF were held in the hospital. Stool testing showed an osmotic cause for her diarrhea. She reports significant gas and bloating as well    She currently takes very little PO before becoming nauseous. She was transitioned to continuous tube feeds but higher rates caused her to be nauseous and for a long time was stuck at 20 ml/hr (goal 40 ml/hr). She recently was able to ramp up to 35 ml/hr. She had been losing weight and was down to 111 lbs. Today she reports being up to 115 lbs. No abdominal pain or fevers.     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    Hepatitis 2017    Hep C, [facility]    HTN (hypertension)     Lung infection 11/30/2022    Osteopenia      Patient Active Problem List   Diagnosis    COPD (chronic obstructive pulmonary disease) (H)    Abnormal findings on diagnostic imaging of cardiovascular system    Status post coronary angiogram    Hepatitis C, chronic (H)    S/P lung transplant (H)    Acute post-operative pain    Immunosuppressed status (H)    Acute kidney failure with tubular necrosis (H)    Thrombus of left radial artery (H)    Diaphragm dysfunction    Paroxysmal A-fib (H)    Administration of long-term prophylactic antibiotics    EBV (Vibha-Barr virus) viremia    Acute pylorus ulcer     Hypogammaglobulinemia (H)    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 difficile colitis    Leukopenia, unspecified type       PSurgHx:  Past Surgical History:   Procedure Laterality Date    BRONCHOSCOPY (RIGID OR FLEXIBLE), DIAGNOSTIC N/A 8/2/2022    Procedure: BRONCHOSCOPY, DIAGNOSTIC- inspection Bronch;  Surgeon: Kamala Lovell MD;  Location: [facility]    BRONCHOSCOPY (RIGID OR FLEXIBLE), DIAGNOSTIC N/A 9/13/2022    Procedure: INSPECTION BRONCHOSCOPY, WITH BRONCHOALVEOLAR LAVAGE;  Surgeon: Joes R Mccullough MD;  Location: [facility]    BRONCHOSCOPY (RIGID OR FLEXIBLE), DIAGNOSTIC N/A 11/9/2022    Procedure: BRONCHOSCOPY, WITH BRONCHOALVEOLAR LAVAGE AND BIOPSY;  Surgeon: Cesar Lima MD;  [facility]: [facility]    BRONCHOSCOPY (RIGID OR FLEXIBLE), DIAGNOSTIC N/A 1/25/2023    Procedure: BRONCHOSCOPY, WITH BRONCHOALVEOLAR LAVAGE AND BIOPSY;  Surgeon: Mason Reddy MD;  [facility]: [facility]    BRONCHOSCOPY (RIGID OR FLEXIBLE), DIAGNOSTIC N/A 4/19/2023    Procedure: BRONCHOSCOPY, WITH BRONCHOALVEOLAR LAVAGE AND BIOPSY;  Surgeon: Kamala Lovell MD;  [facility]    BRONCHOSCOPY (RIGID OR FLEXIBLE), DIAGNOSTIC N/A 7/12/2023    Procedure: BRONCHOSCOPY, WITH BRONCHOALVEOLAR LAVAGE AND BIOPSY;  Surgeon: Cesar Lima MD;  [facility]: [facility]    BRONCHOSCOPY FLEXIBLE AND RIGID N/A 07/19/2022    Procedure: BRONCHOSCOPY inspection only;  Surgeon: Bob Liao MD;  Location: [facility]    COLONOSCOPY  2015    CV CORONARY ANGIOGRAM N/A 06/30/2021    Procedure: CV CORONARY ANGIOGRAM;  Surgeon: Alexander Cuellar  MD;  Location:  HEART CARDIAC CATH LAB    CV RIGHT HEART CATH MEASUREMENTS RECORDED N/A 06/30/2021    Procedure: CV RIGHT HEART CATH;  Surgeon: Alexander Cuellar MD;  Location:  HEART CARDIAC CATH LAB    ENDOSCOPIC RETROGRADE CHOLANGIOPANCREATOGRAM N/A 8/11/2022    Procedure: ENDOSCOPIC RETROGRADE CHOLANGIOPANCREATOGRAPHY WITH PANCREATIC DUCT NEEDLE KNIFE AND STENT PLACEMENT, BILE DUCT SPHINCTEROTOMY, BLOOD/DEBRIS REMOVAL AND STENT PLACEMENT;  Surgeon: Cosmo Arroyo MD;  [facility]    ENDOSCOPIC RETROGRADE CHOLANGIOPANCREATOGRAM N/A 10/7/2022    Procedure: ENDOSCOPIC RETROGRADE CHOLANGIOPANCREATOGRAPHY with biliary and pancreatic stent removal, debris removal;  Surgeon: Cosmo Arroyo MD;  Location:  OR    ENT SURGERY  1974    tonsillectomy    ENTEROSCOPY SMALL BOWEL N/A 8/11/2022    Procedure: SMALL BOWEL ENTEROSCOPY;  Surgeon: Cosmo Arroyo MD;  [facility]    ESOPHAGOGASTRODUODENOSCOPY, WITH NASOGASTRIC TUBE INSERTION N/A 07/01/2022    Procedure: ESOPHAGOGASTRODUODENOSCOPY, WITH NASOJEJUNAL TUBE INSERTION;  Surgeon: Ozzy Nickerson MD;  Location:  GI    ESOPHAGOSCOPY, GASTROSCOPY, DUODENOSCOPY (EGD), COMBINED N/A 8/3/2022    Procedure: ESOPHAGOGASTRODUODENOSCOPY (EGD);  Surgeon: Ira Andres MD;  Location:  GI    ESOPHAGOSCOPY, GASTROSCOPY, DUODENOSCOPY (EGD), COMBINED N/A 1/25/2023    Procedure: ESOPHAGOGASTRODUODENOSCOPY (EGD) with botox injection;  Surgeon: Gonzalez Navarro MD;  Location:  GI    HAND SURGERY      INSERT CHEST TUBE Right 9/13/2022    Procedure: Insert chest tube;  Surgeon: Jose R Mccullough MD;  Location:  GI    IR CVC TUNNEL PLACEMENT > 5 YRS OF AGE  9/26/2022    IR GASTRO JEJUNOSTOMY TUBE CHANGE  8/31/2022    IR GASTRO JEJUNOSTOMY TUBE CHANGE  12/21/2022    IR GASTRO JEJUNOSTOMY TUBE CHANGE  7/12/2023    IR GASTRO JEJUNOSTOMY TUBE PLACEMENT  7/27/2022    IR THORACENTESIS  8/29/2022    LEEP TX, CERVICAL  04/07/2017    HECTOR III     LYMPH NODE BIOPSY Left 2005    Left axilla, benign- [place]    MIDLINE INSERTION - DOUBLE LUMEN Left 07/28/2022    20cm, Basilic vein    REPLACE GASTROJEJUNOSTOMY TUBE, PERCUTANEOUS  10/7/2022    Procedure: Replace Gastrojejunostomy Tube;  Surgeon: Cosmo Arroyo MD;  [facility]    THORACENTESIS Left 8/29/2022    Procedure: THORACENTESIS;  Surgeon: Bo Capone PA-C;  Location: [facility] OR    THORACENTESIS Left 9/13/2022    Procedure: Thoracentesis;  Surgeon: Jose R Mccullough MD;  [facility]: [facility]    THROMBECTOMY UPPER EXTREMITY Left 07/02/2022    Procedure: LEFT RADIAL ARM THROMBECTOMY;  Surgeon: Christie Graham MD;  [facility]    TRANSPLANT LUNG RECIPIENT SINGLE X2 Bilateral 06/28/2022    Procedure: Clamshell Incision, Bilateral Sequential Lung Transplant, On Cardiopulmonary Bypass, Flexible Bronchoscopy;  Surgeon: Sue Sunshine MD;  Location: [facility]       MEDS:  Current Outpatient Medications   Medication    alcohol swab prep pads    azaTHIOprine (IMURAN) 5 mg/mL SUSP    blood glucose (CONTOUR NEXT TEST) test strip    blood glucose (NO BRAND SPECIFIED) lancets standard    blood glucose monitoring (CONTOUR NEXT MONITOR W/DEVICE KIT) meter device kit    Calcium Carbonate-Vitamin D 600-10 MG-MCG TABS    cyanocobalamin (CYANOCOBALAMIN) 500 MCG tablet    dapsone 2 mg/mL SUSP    insulin pen needle (32G X 4 MM) 32G X 4 MM miscellaneous    loperamide (IMODIUM A-D) 2 MG tablet    metoprolol tartrate (LOPRESSOR) 50 MG tablet    Nutritional Supplements (GLUCOSE MANAGEMENT) TABS    pantoprazole (PROTONIX) 2 mg/mL SUSP suspension    predniSONE (DELTASONE) 5 MG tablet    protein modular (PROSOURCE TF) LIQD    sevelamer carbonate, RENVELA, 0.8 GM PACK Packet    Sharps Container MISC    tacrolimus (GENERIC EQUIVALENT) 1 mg/mL suspension     No current facility-administered medications for this visit.     ALLERGIES:    Allergies   Allergen Reactions    Blood Transfusion Related  "(Informational Only) Other (See Comments)     Patient has a history of a clinically significant antibody against RBC antigens.  A delay in compatible RBCs may occur.      FHx: Noncontributory    SOCIAL Hx:  Social History     Socioeconomic History    Marital status: Single     Spouse name: Not on file    Number of children: Not on file    Years of education: Not on file    Highest education level: Not on file   Occupational History    Not on file   Tobacco Use    Smoking status: Former     Years: 30.00     Types: Cigarettes     Quit date: 2020     Years since quittin.7    Smokeless tobacco: Never   Substance and Sexual Activity    Alcohol use: Not Currently    Drug use: Not Currently     Types: Marijuana, Methamphetamines     Comment: hx:marijuana and methamphetamine-quit both unsure ?  2-3 yrs ago    Sexual activity: Not on file   Other Topics Concern    Parent/sibling w/ CABG, MI or angioplasty before 65F 55M? Not Asked   Social History Narrative    Not on file     Social Determinants of Health     Financial Resource Strain: Not on file   Food Insecurity: Not on file   Transportation Needs: Not on file   Physical Activity: Not on file   Stress: Not on file   Social Connections: Not on file   Intimate Partner Violence: Not on file   Housing Stability: Not on file       ROS: A comprehensive Review of Systems was asked and answered in the negative unless specifically commented upon in the HPI    Physical Exam  Ht 1.575 m (5' 2\")   Wt 52.2 kg (115 lb)   BMI 21.03 kg/m    Body mass index is 21.03 kg/m .  Gen: A&Ox3, NAD  HEENT: Moist mucus membranes, no scleral icterus.  Lungs: no respiratory distress      LABS:  External Order Results on 2022   Component Date Value Ref Range Status    Sodium (External) 2022 137  136 - 146 mmol/L Final    Potassium (External) 2022 4.1  3.5 - 5.1 mmol/L Final    Chloride (External) (External) 2022 97 (L)  98 - 107 mmol/L Final    CO2 (External) " 12/30/2022 27  22 - 29 mmol/L Final    Anion Gap (External) 12/30/2022 17.1  10.0 - 20.0 mmol/L Final    Creatinine (External) 12/30/2022 4.34  mg/dL Final    Urea Nitrogen (External) 12/30/2022 43.6 (H)  10.0 - 20.0 mg/dL Final    BUN/Creatinine Ratio (External) 12/30/2022 10.05 (L)  11.70 - 22.90 ratio Final    Calcium (External) 12/30/2022 9.5  8.6 - 10.5 mg/dL Final    Glucose (External) 12/30/2022 102 (H)  70 - 100 mg/dL Final    GFR Estimated (External) 12/30/2022 11 (L)  >=60 ml/min/1.73m2 Final    Magnesium (External) 12/30/2022 2.5  1.8 - 2.6 mg/dL Final    WBC Count (External) 12/30/2022 4.7  10(3)/uL Final    RBC Count (External) 12/30/2022 2.81  10(6)/uL Final    Hemoglobin (External) 12/30/2022 10.3  g/dL Final    Hematocrit (External) 12/30/2022 31.1  % Final    MCV (External) 12/30/2022 110.7  fL Final    MCH (External) 12/30/2022 36.5  pg Final    MCHC (External) 12/30/2022 33.0  32.0 - 36.0 g/dL Final    RDW (External) 12/30/2022 16.2  % Final    Platelet Count (External) 12/30/2022 251  179 - 450 10(3)/uL Final         IMAGING:  EXAM:  NM GASTRIC EMPTYING, 1/2/2023 1:21 PM  HISTORY: Gastroparesis; Lung replaced by transplant (H)     TECHNIQUE: The patient ingested 2.1 mCi of Tc-99m sulfur colloid in 2  eggs, 2 pieces of toast w/ jelly. Static images were acquired at  approximately 1 hour intervals out through 4 hours using a dual head  gamma camera in an anterior and posterior position. The calculation of  emptying was based on dual head imaging with geometric mean  calculations.  A Linear square fit was calculated to the emptying  curve to determine the amount of residual activity at each time point.     FINDINGS:   Percent retention at 60 min = 87%.  Percent retention at 120 min = 77%.  Percent retention at 180 min = 77%.  Percent retention at 240 min = 75%.     T 1/2 emptying time =  238 mins.                                                                      IMPRESSION: Delayed gastric  emptying    EXAM:  NM GASTRIC EMPTYING, 9/30/2022 12:32 PM     HISTORY: severe delayed gastric emptying for follow up; is off  dialysis. NPO weds night     TECHNIQUE: The patient ingested 2.3 mCi of Tc-99m sulfur colloid in in  2 eggs, 1 slices of toast with jelly, and a glass of water. Static  images were acquired at approximately 1 hour intervals out through 4  hours using a dual head gamma camera in an anterior and posterior  position. The calculation of emptying was based on dual head imaging  with geometric mean calculations.  A Linear square fit was calculated  to the emptying curve to determine the amount of residual activity at  each time point.     FINDINGS:   Percent retention at 60 min = 97%.  Percent retention at 120 min = 95%.  Percent retention at 180 min = 91%.  Percent retention at 240 min = 91%.     T 1/2 emptying time =  Too long to calculate.                                                                      IMPRESSION: Severe delayed gastric emptying    Endoscopies:  Upper GI Endoscopy 08/03/2022 11:49 AM [address] [address] [phone]     Endoscopy Department   _______________________________________________________________________________   Patient Name: Sofie Rodriguez            Procedure Date: 8/3/2022 11:49 AM   MRN: [id number]                       Account Number: [id number]   YOB: 1960               Admit Type: Inpatient   Age: 62                               Room: [place] [address]   Gender: Female                        Note Status: Finalized   Attending MD: GREGORIO CARSON MD Pause for the Cause: pause for    cause done   Total Sedation Time: 16 minutes         _______________________________________________________________________________       Procedure:             Upper GI endoscopy   Indications:           Coffee-ground emesis   Providers:             GREGORIO CARSON MD,  NELSON FINE, Josefina Marks RN, Lashonda Murrell RN   Patient Profile:       Ms. Jimenez is a 62 year old female with end stage COPD                          s/p bilateral lung transplant (6/28/22) complicated by                          acute limb ischemia of LUE requiring left radial                          thrombectomy, EBV viremia and C.diff (vancomycin                          7/12/22-7/28/22). She was found to have delayed                          gastric emptying on GES and underwent percutaneous GJ                          tube placement by IR on 7/27/22. GI consulted for                          development of dark G tube output concerning for upper                          GI bleed.   Referring MD:             Medicines:             Midazolam 3 mg IV, Fentanyl 100 micrograms IV   Complications:         No immediate complications.   _______________________________________________________________________________   Procedure:             Pre-Anesthesia Assessment:                          - Prior to the procedure, a History and Physical was                          performed, and patient medications and allergies were                          reviewed. The patient is competent. The risks and                          benefits of the procedure and the sedation options and                          risks were discussed with the patient. All questions                          were answered and informed consent was obtained.                          Patient identification and proposed procedure were                          verified by the physician and the nurse in the                          procedure room. Mental Status Examination: normal.                          Airway Examination: normal oropharyngeal airway and                          neck mobility and Mallampati Class III (part of the                          uvula and soft palate visualized). Respiratory                           Examination: clear to auscultation. CV Examination:                          normal. Prophylactic Antibiotics: The patient does not                          require prophylactic antibiotics. Prior                          Anticoagulants: The patient has taken no anticoagulant                          or antiplatelet agents. ASA Grade Assessment: III - A                          patient with severe systemic disease. After reviewing                          the risks and benefits, the patient was deemed in                          satisfactory condition to undergo the procedure. The                          anesthesia plan was to use moderate sedation /                          analgesia (conscious sedation). Immediately prior to                          administration of medications, the patient was                          re-assessed for adequacy to receive sedatives. The                          heart rate, respiratory rate, oxygen saturations,                          blood pressure, adequacy of pulmonary ventilation, and                          response to care were monitored throughout the                          procedure. The physical status of the patient was                          re-assessed after the procedure.                          After obtaining informed consent, the endoscope was                          passed under direct vision. Throughout the procedure,                          the patient's blood pressure, pulse, and oxygen                          saturations were monitored continuously. The Endoscope                          was introduced through the mouth, and advanced to the                          second part of duodenum. The upper GI endoscopy was                          accomplished without difficulty. The patient tolerated                          the procedure well.                                                                                     Findings:        The examined  esophagus was normal.        The Z-line was regular and was found 40 cm from the incisors.        Excessive fluid and solid food pieces were found in the gastric fundus        and body. This could not be cleared.        One non-bleeding superficial ulcer at the pyloric channel with no        stigmata of bleeding was found at the pylorus. The lesion was 7 mm in        largest dimension. There was inflammation (edema, erythema) surrounding        the ulcer.        There was evidence of an intact gastrostomy present on the anterior wall        of the stomach with J tube extension traversing the pylorus. The area        around gastric feeding tube was inspected with mild irritation and        erythema under the balloon (not unexpected with recent feeding tube        placement) but no ulcer or stigmata of bleeding.        Given the presence of feeding tube extending through the pylorus as well        as ulcer-related inflammation leading to some pyloric channel narrowing,        it did take some additional pressure to traverse the pylous, but the        adult endoscope was able to pass.        The duodenal bulb, first portion of the duodenum and second portion of        the duodenum were normal. J tube visualized in duodenum.                                                                                     Moderate Sedation:        Moderate (conscious) sedation was administered by the endoscopy nurse        and supervised by the endoscopist. The patient's oxygen saturation,        heart rate, blood pressure and response to care were monitored. Total        physician intraservice time was 16 minutes.   Impression:            Clean-based ulcer at pyloric channel is suspected                          source of bleeding. See above Findings for further                          details.                          - Normal esophagus.                          - Z-line regular, 40 cm from the incisors.                          -  Excessive gastric fluid and residual food.                          - Non-bleeding ulcer with surrounding inflammation at                          the pyloric channel with no stigmata of bleeding.                          - Intact gastrostomy with J-extension passing through                          pylorus.                          - Some increased pressure required to pass adult                          endoscope through pylorus in setting of inflammation                          and presence of J-tube.                          - Normal duodenal bulb, first portion of the duodenum                          and second portion of the duodenum.                          - No specimens collected.   Recommendation:        - Return patient to hospital [facility] for ongoing care.                          - Strict NPO to allow time for stomach to empty given                          large residual gastric contents. Would avoid                          prokinetics at this time given concern for pyloric                          channel narrowing 2/2 inflammation.                          - Vent G tube to allow drainage of fluid/gas.                          - Continue pantoprazole 40 mg IV BID                          - Okay to resume anticoagulation tomorrow.                          - Repeat upper endoscopy in 8 weeks to check healing                          of ulcer. Pending clinical picture and endoscopic                          findings may need to consider pyloric dilation if                          stricture develops from current inflammation.                          - Disucssed findings with patient                          - Discussed findings and recommendations with primary                          medication team as well as pulmonary team taking care                          of patient.                                                                                       Ira Andres MD      Upper GI  Endoscopy 01/25/2023 11:27 AM [address] [place] [address] [phone]     Endoscopy Department   _______________________________________________________________________________   Patient Name: Sofie Rodriguez            Procedure Date: 1/25/2023 11:27 AM   MRN: [id number]                       Account Number: [id number]   YOB: 1960               Admit Type: Outpatient   Age: 62                               Room: [place] [address]   Gender: Female                        Note Status: Finalized   Attending MD: OSKAR PÉREZ MD       Pause for the Cause: time out performed   Total Sedation Time:                     _______________________________________________________________________________       Procedure:             Upper GI endoscopy   Indications:           For therapy of gastroparesis; 62 year old female with                          a PMHx of end stage COPD s/p bilateral lung transplant                          on 6/28/22 complicated by acute limb ischemia of LUE                           s/p left radial thrombectomy, h/o C. Diff, h/o EBV                          viremia, ESRD on dialysis, hemobilia s/p ERCP                          presumably due to hemorrhage into gallbladder,                          referred for gastroparesis s/p PEGJ placement. History                          notable for a pyloric channel ulcer. Plan for a trial                          of Botox. GCSI= 3,0,0,3,2,2,3,4,4=21   Providers:             OSKAR PÉREZ MD, Jana Weller RN   Referring MD:             Requesting Provider:   MICHELE SOSA MD   Medicines:             Fentanyl 150 micrograms IV, Midazolam 4 mg IV   Complications:         No immediate complications. Estimated blood loss:                          Minimal.   _______________________________________________________________________________   Procedure:             Pre-Anesthesia  Assessment:                          - Prior to the procedure, a History and Physical was                          performed, and patient medications and allergies were                          reviewed. The patient is competent. The risks and                          benefits of the procedure and the sedation options and                          risks were discussed with the patient. All questions                          were answered and informed consent was obtained.                          Patient identification and proposed procedure were                          verified by the physician and the nurse in the                          procedure room. Mental Status Examination: alert and                          oriented. Airway Examination: normal oropharyngeal                          airway and neck mobility. Respiratory Examination:                          clear to auscultation. CV Examination: normal.                          Prophylactic Antibiotics: The patient does not require                          prophylactic antibiotics. Prior Anticoagulants: The                          patient has taken no anticoagulant or antiplatelet                          agents. ASA Grade Assessment: III - A patient with                          severe systemic disease. After reviewing the risks and                          benefits, the patient was deemed in satisfactory                          condition to undergo the procedure. The anesthesia                          plan was to use moderate sedation / analgesia                          (conscious sedation). Immediately prior to                          administration of medications, the patient was                          re-assessed for adequacy to receive sedatives. The                          heart rate, respiratory rate, oxygen saturations,                          blood pressure, adequacy of pulmonary ventilation, and                          response to care  were monitored throughout the                          procedure. The physical status of the patient was                          re-assessed after the procedure.                          After obtaining informed consent, the endoscope was                          passed under direct vision. Throughout the procedure,                          the patient's blood pressure, pulse, and oxygen                          saturations were monitored continuously. The Endoscope                          was introduced through the mouth, and advanced to the                          second part of duodenum. The upper GI endoscopy was                          accomplished without difficulty. The patient tolerated                          the procedure well.                                                                                     Findings:        The esophagus was normal.        There was evidence of an intact gastrostomy with a patent GJ-tube        present in the gastric antrum extending into the duodenum. This was        characterized by healthy appearing mucosa.        Localized nodular mucosa was found at the pylorus at the 11 o'clock        position corresponding to the location of her previously noted pyloric        channel ulcer.        Pylorus was otherwise normal. Pylorus was successfully injected with 200        units botulinum toxin divided into four quadrants in a total of 4 mL.        Estimated blood loss was minimal.        The examined duodenum was normal.                                                                                     Moderate Sedation:        Moderate (conscious) sedation was administered by the endoscopy nurse        and supervised by the endoscopist. The patient's oxygen saturation,        heart rate, blood pressure and response to care were monitored. Total        physician intraservice time was 10 minutes.   Impression:            - Normal esophagus.                          -  PEGJ tube in place                          - Nodular mucosa at the pylorus at the 11 o'clock                          position corresponding to the location of her                          previously noted pyloric channel ulcer consistent with                          granulation tissue. Ulcer no longer present                          - Pylorus injected with 200 units of Botox                          - Normal examined duodenum.                          - Today's GCSI= 3,0,0,3,2,2,3,4,4=21                          - No specimens collected.   Recommendation:        - Discharge patient to home (ambulatory).                          - Resume diet as tolerated today                          - GJ tube may be used immediately as before                          - In the future, G-POEM may still be possible along                          the 5 o'clock postion of the pylorus but may still run                          into significant scar tissue from prior ulceration.                          - Return to clinic in 1 month with Dr. Navarro to assess                          response.                                                                                       Gonzalez Navarro MD      [facility]   Patient Name: Sofie Rodriguez   Procedure Date: 3/9/2023 2:54 PM   MRN: [id number]   YOB: 1960   Admit Type: Inpatient   Age: 62   Room: [facility]   Gender: Female   Note Status: Finalized   Attending MD: Ketan Boston MD,   Instrument Name: [id number]   Procedure:             Colonoscopy   Indications:           Chronic diarrhea in immunosupressed patient.   Providers:             Ketna Boston MD   Referring Provider:    Vinny Berrios MD (Referring MD)   Medicines:             Monitored Anesthesia Care   Complications:         No immediate complications.     Procedure:             After I obtained informed consent, the scope was                          passed  under direct vision. Throughout the procedure,                          the patient's blood pressure, pulse, and oxygen                          saturations were monitored continuously. The                          Colonoscope was introduced through the anus and                          advanced to the terminal ileum. After I obtained                          informed consent, the scope was passed under direct                          vision. Throughout the procedure, the patient's blood                          pressure, pulse, and oxygen saturations were monitored                          continuously.The colonoscopy was performed without                          difficulty. The patient tolerated the procedure well.                          The quality of the bowel preparation was evaluated                          using the BBPS ([place] Bowel Preparation Scale) with                          scores of: Right Colon = 3, Transverse Colon = 3 and                          Left Colon = 3 (entire mucosa seen well with no                          residual staining, small fragments of stool or opaque                          liquid). The total BBPS score equals 9.     Findings:       The perianal and digital rectal examinations were normal. Pertinent        negatives include normal sphincter tone, no palpable rectal lesions and        no anal lesion or abnormality.        The colon (entire examined portion) appeared normal. Biopsies were taken        with a cold forceps for histology to check for chronic diarrhea,        especially CMV.        Many diverticula were found in the sigmoid colon and descending colon.        A 4 to 8 mm polyp was found in the ascending colon. The polyp was        sessile. The polyp was removed with a cold snare. Resection and        retrieval were complete. Patient bleeds easily and stops spontaneously.        However one clip placed in one polypectomy site b/c of continuous        bleeding.  No bleeding at the endf o the procedure.        The terminal ileum appeared normal. Biopsies were taken with a cold        forceps for histology.     Impression:            - The entire examined colon is normal. Biopsied.                          - Diverticulosis in the sigmoid colon and in the                          descending colon.                          - One 4 to 8 mm polyp in the ascending colon, removed                          with a cold snare. Resected and retrieved.                          - The examined portion of the ileum was normal.                          Biopsied.     Recommendation:        - Await pathology results.                          - Advance diet as tolerated.                          - Repeat colonoscopy in 3y.   Ketan Boston MD     Final Diagnosis    A. TERMINAL ILEUM, BIOPSY  --ILEAL MUCOSA WITH NO DIAGNOSTIC ABNORMALITY     B. COLON, ASCENDING/RIGHT, BIOPSY  --FRAGMENTS OF TUBULAR ADENOMA     C. COLON, RANDOM, BIOPSY  --COLONIC MUCOSA WITH NO DIAGNOSTIC ABNORMALITY  --IMMUNOHISTOCHEMICAL STAIN FOR CMV IS NEGATIVE

## 2023-08-04 ENCOUNTER — LAB (OUTPATIENT)
Dept: LAB | Facility: CLINIC | Age: 63
End: 2023-08-04
Payer: MEDICARE

## 2023-08-04 ENCOUNTER — TELEPHONE (OUTPATIENT)
Dept: TRANSPLANT | Facility: CLINIC | Age: 63
End: 2023-08-04
Payer: MEDICARE

## 2023-08-04 DIAGNOSIS — Z94.2 LUNG REPLACED BY TRANSPLANT (H): ICD-10-CM

## 2023-08-04 DIAGNOSIS — Z94.2 S/P LUNG TRANSPLANT (H): Primary | ICD-10-CM

## 2023-08-04 DIAGNOSIS — Z94.2 S/P LUNG TRANSPLANT (H): ICD-10-CM

## 2023-08-04 PROCEDURE — 80197 ASSAY OF TACROLIMUS: CPT

## 2023-08-04 NOTE — TELEPHONE ENCOUNTER
Critical lab value: Creatinine 6.10    Pt reports having a shortened dialysis run yesterday because she was leaving to go out of town. Will have dialysis again tomorrow.     Pt also mentioned stopper on her G port for PEG tube broke off, she is using tape to keep in place but having issues with it leaking still. Referral for IR to replace tube placed.

## 2023-08-04 NOTE — TELEPHONE ENCOUNTER
DATE:  8/4/2023     TIME OF RECEIPT FROM LAB:  12:00    ORDERING PROVIDER: Claribel Franks    LAB TEST:  creatinine     LAB VALUE:  6.10    RESULTS GIVEN WITH READ-BACK TO (PROVIDER):  Girma Sharpe    TIME LAB VALUE REPORTED TO PROVIDER:   12:02

## 2023-08-05 LAB
TACROLIMUS BLD-MCNC: 5.4 UG/L (ref 5–15)
TME LAST DOSE: NORMAL H
TME LAST DOSE: NORMAL H

## 2023-08-08 DIAGNOSIS — Z94.2 S/P LUNG TRANSPLANT (H): ICD-10-CM

## 2023-08-08 NOTE — PROGRESS NOTES
Tacrolimus level 5.4 at 12 hours, on 8/4/23.  Goal 8-10.   Current dose 3 mg in AM, 3.5 mg in PM    Dose changed to 4 mg in AM, 3.5 mg in PM   Recheck level later this week    Discussed with patient    MyChart message sent

## 2023-08-09 LAB
BACTERIA BRONCH: NO GROWTH
BACTERIA BRONCH: NO GROWTH

## 2023-08-10 ENCOUNTER — MYC MEDICAL ADVICE (OUTPATIENT)
Dept: GASTROENTEROLOGY | Facility: CLINIC | Age: 63
End: 2023-08-10
Payer: MEDICARE

## 2023-08-14 ENCOUNTER — APPOINTMENT (OUTPATIENT)
Dept: LAB | Facility: CLINIC | Age: 63
End: 2023-08-14
Payer: MEDICARE

## 2023-08-14 PROCEDURE — 80197 ASSAY OF TACROLIMUS: CPT

## 2023-08-15 LAB
TACROLIMUS BLD-MCNC: 6.9 UG/L (ref 5–15)
TME LAST DOSE: NORMAL H
TME LAST DOSE: NORMAL H

## 2023-08-16 DIAGNOSIS — Z94.2 S/P LUNG TRANSPLANT (H): ICD-10-CM

## 2023-08-16 NOTE — PROGRESS NOTES
Tacrolimus level 6.9 at 12.25 hours, on 8/14/23.  Goal 8-10.   Current dose 4 mg in AM, 3.5 mg in PM    Dose changed to 4 mg in AM, 4 mg in PM   Recheck level in 7-10 days    [employer] message sent

## 2023-08-18 ENCOUNTER — HOSPITAL ENCOUNTER (OUTPATIENT)
Dept: INTERVENTIONAL RADIOLOGY/VASCULAR | Facility: CLINIC | Age: 63
Discharge: HOME | End: 2023-08-18
Attending: INTERNAL MEDICINE | Admitting: RADIOLOGY
Payer: MEDICARE

## 2023-08-18 VITALS
OXYGEN SATURATION: 100 % | DIASTOLIC BLOOD PRESSURE: 75 MMHG | TEMPERATURE: 98.4 F | RESPIRATION RATE: 20 BRPM | SYSTOLIC BLOOD PRESSURE: 147 MMHG | HEART RATE: 90 BPM

## 2023-08-18 DIAGNOSIS — R63.30 FEEDING DIFFICULTIES: Primary | ICD-10-CM

## 2023-08-18 DIAGNOSIS — Z94.2 S/P LUNG TRANSPLANT (H): ICD-10-CM

## 2023-08-18 PROCEDURE — 49452 REPLACE G-J TUBE PERC: CPT

## 2023-08-18 PROCEDURE — C1769 GUIDE WIRE: HCPCS

## 2023-08-18 NOTE — PROGRESS NOTES
Patient Name: Sofie Rodriguez  Medical Record Number: [id number]  Today's Date: 8/18/2023    Procedure: GJ tube exchange without sedation  Proceduralist: Dr. Rogers    Procedure Start: 1454  Procedure end: 1457    Other Notes: Pt arrived to IR room 1 from Pre/post bay 3. Consent reviewed. Pt denies any questions or concerns regarding procedure. Pt positioned supine and monitored per protocol. Pt tolerated procedure without any noted complications. VSS on RA. Pt transferred back to Pre/post bay 3.

## 2023-08-21 ENCOUNTER — HOSPITAL ENCOUNTER (OUTPATIENT)
Facility: CLINIC | Age: 63
Discharge: HOME | End: 2023-08-21
Attending: INTERNAL MEDICINE
Payer: MEDICARE

## 2023-08-21 PROCEDURE — 80197 ASSAY OF TACROLIMUS: CPT

## 2023-08-22 ENCOUNTER — TELEPHONE (OUTPATIENT)
Dept: GASTROENTEROLOGY | Facility: CLINIC | Age: 63
End: 2023-08-22
Payer: MEDICARE

## 2023-08-22 LAB
TACROLIMUS BLD-MCNC: 10.7 UG/L (ref 5–15)
TME LAST DOSE: NORMAL H
TME LAST DOSE: NORMAL H

## 2023-08-22 NOTE — TELEPHONE ENCOUNTER
Pt had questions about upcoming HBT, pt has concerns about stopping anti-diarrheals. Pt is hoping she could try treatment before needing to stop medications. Sent message to referring providers nurse to discuss if antibiotics would be an option.

## 2023-08-23 ENCOUNTER — APPOINTMENT (OUTPATIENT)
Dept: LAB | Facility: CLINIC | Age: 63
End: 2023-08-23
Payer: MEDICARE

## 2023-08-23 ENCOUNTER — TELEPHONE (OUTPATIENT)
Dept: PHARMACY | Facility: CLINIC | Age: 63
End: 2023-08-23
Payer: MEDICARE

## 2023-08-23 ENCOUNTER — LAB (OUTPATIENT)
Dept: LAB | Facility: CLINIC | Age: 63
End: 2023-08-23
Payer: MEDICARE

## 2023-08-23 DIAGNOSIS — Z94.2 LUNG REPLACED BY TRANSPLANT (H): Primary | ICD-10-CM

## 2023-08-23 NOTE — RESULT ENCOUNTER NOTE
Tacrolimus level 10.7 on 8/21/23 at 12 hours. Goal 8-10.  Patients tac level too low on lower dose a week ago. No change in dose, will repeat level in a week at next clinic appointment     Discussed with patient

## 2023-08-23 NOTE — TELEPHONE ENCOUNTER
Clinical Pharmacy Consult:                                                      Transplant Specific: 12 Month Post Transplant Call   Date of Transplant: 6/28/2022  Type of Transplant: lung  First Transplant: yes  History of rejection: no    Immunosuppression Regimen   TAC 4mg qAM & 4mg qPM, Prednisone 5mg qAM & 2.5mg qPM and AZA 100mg daily (ON HOLD)  Patient specific goal: 8-10  Most recent level: 10.7, date 8/21/23  Immunosuppressant Levels:  Therapeutic - little on the high side, repeat level to be completed 8/29/23  Pt adherent to lab draws: yes  Scr:   Lab Results   Component Value Date    CR 3.68 11/23/2022    CR 0.85 06/30/2021     Side effects: none reported    Prophylactic Medications  Antibacterial:  Dapsone 50mg 3 times per week  Scheduled Discontinue Date: Lifelong    Antifungal: Nystatin  Scheduled Discontinue Date: therapy complete    Antiviral: CrCl 10 to 24 mL/minute: Valcyte 450 mg twice weekly   Scheduled Discontinue Date: Therapy Complete    Acid Reducer: Protonix (pantoprazole)  Scheduled Reviewed Date: Lifelong    Thrombosis Prevention: Not on  Scheduled Discontinue Date:  N/A    Blood Pressure Management  Frequency of home Blood Pressure checks: twice daily (AM and PM)  Most recent home BP: 123/71  Patient Blood pressure goal: <140/90  Patient blood pressure at goal:  yes  Hospitalizations/ER visits since last assessment: 0    Med rec/DUR performed: yes  Med Rec Discrepancies: no      Sofie reports feeling pretty good.  She is not currently having any side effects.  Her azathiprine is currently on hold.  She uses her pill box, phone alarms, and med card.  She reported that she has not missed any doses and does not have any problems with getting refills.      Last tacro level was a little high and a repeat lab is being completed on 8/29/23.  Blood pressure is being checked twice a day and is at goal.     No other questions or concerns today.  Follow up in 1 year.    Mechelle Andrews  PharmD  [facility]  Specialty - [phone]  Transplant - [phone]

## 2023-08-24 ENCOUNTER — PATIENT OUTREACH (OUTPATIENT)
Dept: GASTROENTEROLOGY | Facility: CLINIC | Age: 63
End: 2023-08-24

## 2023-08-24 NOTE — TELEPHONE ENCOUNTER
Left message for pt that Dr. Navarro would like pt to have HBT, he has put in an RX for pt to have Rifaximin after HBT, however may include Neomycin if pt is positive for CH4. Requested pt to call the writer back to discuss if pt can stop anti-diarrheals prior to HBT

## 2023-08-25 ENCOUNTER — PATIENT OUTREACH (OUTPATIENT)
Dept: GASTROENTEROLOGY | Facility: CLINIC | Age: 63
End: 2023-08-25
Payer: MEDICARE

## 2023-08-25 ENCOUNTER — TELEPHONE (OUTPATIENT)
Dept: GASTROENTEROLOGY | Facility: CLINIC | Age: 63
End: 2023-08-25
Payer: MEDICARE

## 2023-08-25 NOTE — TELEPHONE ENCOUNTER
Pt left VM with request to move procedure date from 10/2 to 10/9 if possible. Returned call and left VM.     Penny Vogel, RN, BSN,   [facility]  Care coordinator

## 2023-08-25 NOTE — TELEPHONE ENCOUNTER
Rec'd call from pt with questions about upcoming HBT and medications. Pt will proceed with testing and will stop anti-diarrheal medications the evening before the test as that is the only option for her. Pt advised to take her transplant medications as prescribed but hold all other morning meds until after testing is complete. Pt is agreeable to this plan.

## 2023-08-27 ENCOUNTER — LAB (OUTPATIENT)
Dept: LAB | Facility: CLINIC | Age: 63
End: 2023-08-27
Payer: MEDICARE

## 2023-08-28 ENCOUNTER — APPOINTMENT (OUTPATIENT)
Dept: LAB | Facility: CLINIC | Age: 63
End: 2023-08-28
Payer: MEDICARE

## 2023-08-28 ENCOUNTER — TELEPHONE (OUTPATIENT)
Dept: GASTROENTEROLOGY | Facility: CLINIC | Age: 63
End: 2023-08-28
Payer: MEDICARE

## 2023-08-28 LAB
TACROLIMUS BLD-MCNC: 10.2 UG/L (ref 5–15)
TME LAST DOSE: NORMAL H
TME LAST DOSE: NORMAL H

## 2023-08-28 PROCEDURE — 80197 ASSAY OF TACROLIMUS: CPT | Performed by: INTERNAL MEDICINE

## 2023-08-28 NOTE — PROGRESS NOTES
[facility]  August 29, 2023         Assessment and Plan:   Sofie Rodriguez is a 63 year old female with h/o BSLT for COPD on 6/28/22 with course complicated by post-operative hemidiaphragm palsy, persistent pleural effusions, recurrent PNAs, positive DSA, EBV viremia, hypogammaglobulinemia, severe gastroparesis s/p G/J tube placement 7/27/22 with pyloric botox 1/25/23, GI bleed 2/2 pyloric ulcer, hemobilia s/p ERCP and MRCP, chronic diarrhea, recurrent C diff colitis, and ESRD on HD. PMH also notable for HFpEF, ASCVD, NTM colonization, hep C, and methamphetamine use. Admitted 5/17-5/23 for intolerance of tube feedings with weight loss, neutropenia, and failure to thrive. Was supposed to be readmitted for FTT in July, but she eventually refused. This is a routine follow up today.     1. S/p bilateral lung transplant:   Right hemidiaphragm palsy: no new pulmonary complaints, walking around the block for 15 minutes 1-2 times/day and was able to walk at the fair last Thursday. Sating 96% on room air. CMV 6/7 negative. ImmuKnow assay 73 on 5/18, indicative of increased risk of infection, down to 43 on recheck in June. S/p bronch on 7/12 with cultures + for H. influenzae s/p levaquin, no evidence of rejection ([id number]). CXR reviewed today and demonstrates stable changes of transplant. PFTs down 110 ml today, at her low baseline (1.4-1.5L). Completed sleep study, didn't qualify for O2, but recommendation is to consider CPAP.   - Schedule follow up with sleep to discuss possible CPAP  - Continue IS including tacrolimus (goal 8-12) and prednisone  - AZA on hold given low ImmunKnow  - Dapsone qMWF for PJP ppx  - Will plan for resuming pulmonary rehab at next visit     2. Positive DSA: no prior treatment for AMR but has been a concern since pt. initially demonstrated rising DSA post-transplant. Ongoing positive DQB2 (MFI of 6217, down from 7847) on 7/11.   - DSA will be  drawn with next labs    3. Hypogammaglobulinemia: h/o prior IVIG infusions but most recent IgG adequate at 959 on 5/18.      4. EBV viremia: last level of 24K (log 4.4) on 7/25.   - EBV pending for today     5. Severe malnutrition of chronic illness:  Gastroparesis s/p PEG/J and botox:   Pyloric ulcer:  Acute on chronic diarrhea:  Recurrent C diff colitis: chronic diarrhea since transplant with recurrent episodes of C diff. GI consulted 5/18, felt stools consistent with osmotic diarrhea. Transitioned to continuous TF, able to tolerate 30 ml/hr although noted nausea and abdominal cramping with increase to 40 ml/hr, which is her goal. Decline admission per above. Was down to 108 lbs in June, currently at 114.1 lbs (still below her recent 126 lbs). Was unable to run TF X 2 weeks following her fistula surgery, resumed at 30 ml/hr X 8 hours per day four days ago. Following with Dr. Navarro, scheduled for SIBO testing and rifaximin x 2 weeks on Friday. Diarrhea is better when she does not run TF, using imodium TID.   - Continue imodium (1 tablet) TID, pantoprazole 40 mg BID  - SIBO breath testing scheduled for Friday with rifaxamin to followw  - EGD with [facility] on 10/9    6. CKD:   HTN: BP overall controlled. Doing dialysis T/Th/Sat. Transplant pre evaluation is currently on hold for kidney until we can improve her GI issues/nutrition/weight gain    RTC: 3 months  Vaccinations: will need covid, RSV and flu vaccine this fall  Annual dermatology visit: following Derm   Preventative: DEXA > June 2025; colonoscopy completed March 2023 (diverticulosis in the sigmoid colon and colonic polyp noted s/p polypectomy)--needs repeat in 2026 per notes, mammogram completed this past winter and was negative    Kaylin Triana PA-C  Pulmonary, Allergy, Critical Care and Sleep Medicine        Interval History:     Has gained a little bit of weight, did [place] state [place]. Physically feeling well. No recent illnesses, no fever  or chills. No allergy complaints, no cough. No new shortness of breath. Doing some walking around the block (daily to BID for about 15 minutes) and some leg lifts and sit to stand. Also using 3 lbs weigt. Did complete her sleep study. No chest pain or palpitations. Running TF at 30 ml/hr X 8 hours per day for the last 4 days (but didn't run it for almost 2 weeks after her surgery for her fistula). Goal is 40 ml/hr at 24 hours per day. Still having nausea after she eats (eating 1-2 time/day) or when she does her tube feeds. No vomiting, stools have been slightly more formed, 1-2 times/day or up to 4 times/day with diarrhea, up to 6 days per week (when she is running her TF).           Review of Systems:   Please see HPI, otherwise the complete 10 point ROS is negative.           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    Hepatitis 2017    Hep C, [facility]    HTN (hypertension)     Lung infection 11/30/2022    Osteopenia      Past Surgical History:   Procedure Laterality Date    BRONCHOSCOPY (RIGID OR FLEXIBLE), DIAGNOSTIC N/A 8/2/2022    Procedure: BRONCHOSCOPY, DIAGNOSTIC- inspection Bronch;  Surgeon: Kamala Lovell MD;  [facility]    BRONCHOSCOPY (RIGID OR FLEXIBLE), DIAGNOSTIC N/A 9/13/2022    Procedure: INSPECTION BRONCHOSCOPY, WITH BRONCHOALVEOLAR LAVAGE;  Surgeon: Jose R Mccullough MD;  [facility]    BRONCHOSCOPY (RIGID OR FLEXIBLE), DIAGNOSTIC N/A 11/9/2022    Procedure: BRONCHOSCOPY, WITH BRONCHOALVEOLAR LAVAGE AND BIOPSY;  Surgeon: Cesar Lima MD;  [facility]    BRONCHOSCOPY (RIGID OR FLEXIBLE), DIAGNOSTIC N/A 1/25/2023    Procedure: BRONCHOSCOPY, WITH BRONCHOALVEOLAR LAVAGE AND BIOPSY;  Surgeon: Mason Reddy MD;  [facility]    BRONCHOSCOPY (RIGID OR FLEXIBLE),  DIAGNOSTIC N/A 4/19/2023    Procedure: BRONCHOSCOPY, WITH BRONCHOALVEOLAR LAVAGE AND BIOPSY;  Surgeon: Kamala Lovell MD;  Location:  GI    BRONCHOSCOPY (RIGID OR FLEXIBLE), DIAGNOSTIC N/A 7/12/2023    Procedure: BRONCHOSCOPY, WITH BRONCHOALVEOLAR LAVAGE AND BIOPSY;  Surgeon: Cesar Lima MD;  Location:  GI    BRONCHOSCOPY FLEXIBLE AND RIGID N/A 07/19/2022    Procedure: BRONCHOSCOPY inspection only;  Surgeon: Bob Liao MD;  Location:  GI    COLONOSCOPY  2015    CV CORONARY ANGIOGRAM N/A 06/30/2021    Procedure: CV CORONARY ANGIOGRAM;  Surgeon: Alexander Cuelalr MD;  Location:  [facility] CATH LAB    CV RIGHT HEART CATH MEASUREMENTS RECORDED N/A 06/30/2021    Procedure: CV RIGHT HEART CATH;  Surgeon: Alexander Cuellar MD;  Location:  HE[facility]ARD[facility] CATH LAB    ENDOSCOPIC RETROGRADE CHOLANGIOPANCREATOGRAM N/A 8/11/2022    Procedure: ENDOSCOPIC RETROGRADE CHOLANGIOPANCREATOGRAPHY WITH PANCREATIC DUCT NEEDLE KNIFE AND STENT PLACEMENT, BILE DUCT SPHINCTEROTOMY, BLOOD/DEBRIS REMOVAL AND STENT PLACEMENT;  Surgeon: Cosmo Arroyo MD;  [facility]    ENDOSCOPIC RETROGRADE CHOLANGIOPANCREATOGRAM N/A 10/7/2022    Procedure: ENDOSCOPIC RETROGRADE CHOLANGIOPANCREATOGRAPHY with biliary and pancreatic stent removal, debris removal;  Surgeon: Cosmo Arroyo MD;  Location:  OR    ENT SURGERY  1974    tonsillectomy    ENTEROSCOPY SMALL BOWEL N/A 8/11/2022    Procedure: SMALL BOWEL ENTEROSCOPY;  Surgeon: Cosmo Arroyo MD;  [facility]    ESOPHAGOGASTRODUODENOSCOPY, WITH NASOGASTRIC TUBE INSERTION N/A 07/01/2022    Procedure: ESOPHAGOGASTRODUODENOSCOPY, WITH NASOJEJUNAL TUBE INSERTION;  Surgeon: Ozzy Nickerson MD;  Location:  GI    ESOPHAGOSCOPY, GASTROSCOPY, DUODENOSCOPY (EGD), COMBINED N/A 8/3/2022    Procedure: ESOPHAGOGASTRODUODENOSCOPY (EGD);  Surgeon: Ira Andres MD;  Location:  GI    ESOPHAGOSCOPY, GASTROSCOPY, DUODENOSCOPY (EGD),  COMBINED N/A 1/25/2023    Procedure: ESOPHAGOGASTRODUODENOSCOPY (EGD) with botox injection;  Surgeon: Gonzalez Navarro MD;  [facility]    HAND SURGERY      INSERT CHEST TUBE Right 9/13/2022    Procedure: Insert chest tube;  Surgeon: Jose R Mccullough MD;  [facility]    IR CVC TUNNEL PLACEMENT > 5 YRS OF AGE  9/26/2022    IR GASTRO JEJUNOSTOMY TUBE CHANGE  8/31/2022    IR GASTRO JEJUNOSTOMY TUBE CHANGE  12/21/2022    IR GASTRO JEJUNOSTOMY TUBE CHANGE  7/12/2023    IR GASTRO JEJUNOSTOMY TUBE CHANGE  8/18/2023    IR GASTRO JEJUNOSTOMY TUBE PLACEMENT  7/27/2022    IR THORACENTESIS  8/29/2022    LEEP TX, CERVICAL  04/07/2017    HECTOR III    LYMPH NODE BIOPSY Left 2005    Left axilla, benign- [place]    MIDLINE INSERTION - DOUBLE LUMEN Left 07/28/2022    20cm, Basilic vein    REPLACE GASTROJEJUNOSTOMY TUBE, PERCUTANEOUS  10/7/2022    Procedure: Replace Gastrojejunostomy Tube;  Surgeon: Cosmo Arroyo MD;  [facility]    THORACENTESIS Left 8/29/2022    Procedure: THORACENTESIS;  Surgeon: Bo Capone PA-C;  Location: [facility]    THORACENTESIS Left 9/13/2022    Procedure: Thoracentesis;  Surgeon: Jose R Mccullough MD;  [facility]    THROMBECTOMY UPPER EXTREMITY Left 07/02/2022    Procedure: LEFT RADIAL ARM THROMBECTOMY;  Surgeon: Christie Graham MD;  Location:  OR    TRANSPLANT LUNG RECIPIENT SINGLE X2 Bilateral 06/28/2022    Procedure: Clamshell Incision, Bilateral Sequential Lung Transplant, On Cardiopulmonary Bypass, Flexible Bronchoscopy;  Surgeon: Sue Sunshine MD;  [facility]           Family History:     No family history on file.         Social History:     Social History     Socioeconomic History    Marital status: Single     Spouse name: Not on file    Number of children: Not on file    Years of education: Not on file    Highest education level: Not on file   Occupational History    Not on file   Tobacco Use    Smoking status: Former     Years: 30.00     Types:  Cigarettes     Quit date: 2020     Years since quittin.7    Smokeless tobacco: Never   Substance and Sexual Activity    Alcohol use: Not Currently    Drug use: Not Currently     Types: Marijuana, Methamphetamines     Comment: hx:marijuana and methamphetamine-quit both unsure ?  2-3 yrs ago    Sexual activity: Not on file   Other Topics Concern    Parent/sibling w/ CABG, MI or angioplasty before 65F 55M? Not Asked   Social History Narrative    Not on file     Social Determinants of Health     Financial Resource Strain: Not on file   Food Insecurity: Not on file   Transportation Needs: Not on file   Physical Activity: Not on file   Stress: Not on file   Social Connections: Not on file   Intimate Partner Violence: Not on file   Housing Stability: Not on file            Medications:     Current Outpatient Medications   Medication    multivitamin (CENTRUM SILVER) tablet    alcohol swab prep pads    azaTHIOprine (IMURAN) 5 mg/mL SUSP    blood glucose (CONTOUR NEXT TEST) test strip    blood glucose (NO BRAND SPECIFIED) lancets standard    blood glucose monitoring (CONTOUR NEXT MONITOR W/DEVICE KIT) meter device kit    Calcium Carbonate-Vitamin D 600-10 MG-MCG TABS    cyanocobalamin (CYANOCOBALAMIN) 500 MCG tablet    dapsone 2 mg/mL SUSP    loperamide (IMODIUM A-D) 2 MG tablet    metoprolol tartrate (LOPRESSOR) 50 MG tablet    pantoprazole (PROTONIX) 2 mg/mL SUSP suspension    predniSONE (DELTASONE) 5 MG tablet    protein modular (PROSOURCE TF) LIQD    sevelamer carbonate, RENVELA, 0.8 GM PACK Packet    Sharps Container MISC    tacrolimus (GENERIC EQUIVALENT) 1 mg/mL suspension     No current facility-administered medications for this visit.            Physical Exam:   /76   Pulse 84   SpO2 96%     GENERAL: alert, NAD  HEENT: NCAT, EOMI, no scleral icterus, oral mucosa moist and without lesions  Neck: no cervical or supraclavicular adenopathy  Lungs: moderate air flow, mainly clear  CV: RRR, S1S2, +  murmur  Abdomen: normoactive BS, soft, non tender  Lymph: no edema  Neuro: AAO X 3, CN 2-12 grossly intact  Psychiatric: normal affect, good eye contact  Skin: no rash, jaundice or lesions on limited exam         Data:   All laboratory and imaging data reviewed.      Recent Results (from the past 168 hour(s))   Tacrolimus by [employer] Mass Spectrometry    Collection Time: 08/28/23  8:15 AM   Result Value Ref Range    Tacrolimus by Tandem Mass Spectrometry 10.2 5.0 - 15.0 ug/L    Tacrolimus Last Dose Date 8/27/2023     Tacrolimus Last Dose Time  7:45 PM    CBC with platelets    Collection Time: 08/28/23  8:15 AM   Result Value Ref Range    WBC Count (External) 5.1 3.7 - 12.1 10(3)/uL    RBC Count (External) 3.21 (L) 3.76 - 5.39 10(6)/uL    Hemoglobin (External) 11.7 11.2 - 15.8 g/dL    Hematocrit (External) 36.0 33.9 - 47.5 %    MCV (External) 112.0 (H) 80.6 - 98.5 fL    MCH (External) 36.4 (H) 26.4 - 32.9 pg    MCHC (External) 32.5 32.0 - 36.0 g/dL    RDW (External) 15.7 10.0 - 16.8 %    Platelet Count (External) 231 179 - 450 10(3)/uL   Magnesium    Collection Time: 08/28/23  8:15 AM   Result Value Ref Range    Magnesium (External) 2.5 1.8 - 2.6 mg/dL   Manual Differential    Collection Time: 08/28/23  8:15 AM   Result Value Ref Range    Method (External) Manual     % Neutrophils (External) 71.0 43.0 - 80.0 %    % Bands (External) 5.0 <=6.0 %    % Lymphocytes (External) 16.0 16.0 - 49.0 %    % Monocytes (External) 6.0 0.0 - 10.0 %    %Metamyelocyte (External) 2.0 (H) <=0.0 %    Absolute Neutrophils (External) 3.6 1.8 - 9.2 10(3)/uL    Absolute Bands (External) 0.3 10(3)/uL    Absolute Lymphocytes (External) 0.8 (L) 1.2 - 3.9 10(3)/uL    Absolute Monocytes (External) 0.3 0.0 - 1.1 10(3)/uL    Absolute Metamyelocytes (External) 0.1 (H) <=0.0 10(3)/uL   Basic metabolic panel    Collection Time: 08/28/23  8:15 AM   Result Value Ref Range    Sodium (External) 139 136 - 146 mmol/L    Potassium (External) 4.2 3.5 - 5.1  mmol/L    Chloride (External) 104 98 - 107 mmol/L    CO2 (External) 23 22 - 29 mmol/L    Anion Gap (External) 16.2 10.0 - 20.0 mmol/L    Creatinine (External) 5.47 (HH) 0.57 - 1.11 mg/dL    Urea Nitrogen (External) 26.9 (H) 10.0 - 20.0 mg/dL    BUN/Creatinine Ratio (External) 4.92 (L) 11.70 - 22.90 ratio    Calcium (External) 9.2 8.6 - 10.5 mg/dL    Glucose (External) 79 70 - 100 mg/dL    GFR Estimated (External) 8 (L) >=60 mL/min/1.73m2   General PFT Lab (Please always keep checked)    Collection Time: 08/29/23 10:40 AM   Result Value Ref Range    FVC-Pred 2.78 L    FVC-Pre 1.48 L    FVC-%Pred-Pre 53 %    FEV1-Pre 1.43 L    FEV1-%Pred-Pre 64 %    FEV1FVC-Pred 80 %    FEV1FVC-Pre 97 %    FEFMax-Pred 6.09 L/sec    FEFMax-Pre 4.62 L/sec    FEFMax-%Pred-Pre 75 %    FEF2575-Pred 2.07 L/sec    FEF2575-Pre 3.16 L/sec    ZOJ1396-%Pred-Pre 152 %    ExpTime-Pre 3.41 sec    FIFMax-Pre 4.07 L/sec    FEV1FEV6-Pred 81 %    FEV1FEV6-Pre 97 %     PFT interpretation:  Maneuver: valid and met ATS guidelines  Mild obstruction based on Z score  Compared to prior: FEV1 of 1.43 is 110 ml below prior  Decreased in FVC may be related to air trapping or restriction; lung volumes would be necessary to determine

## 2023-08-28 NOTE — TELEPHONE ENCOUNTER
Patient lvm wanting to reschedule her procedure with Dr. Navarro that was scheduled for 10/2.    Called and spoke to her, she is rescheduled to 10/9. Reminded about pre-op needed within 30 days.

## 2023-08-29 ENCOUNTER — ANCILLARY PROCEDURE (OUTPATIENT)
Dept: GENERAL RADIOLOGY | Facility: CLINIC | Age: 63
End: 2023-08-29
Attending: INTERNAL MEDICINE
Payer: MEDICARE

## 2023-08-29 ENCOUNTER — OFFICE VISIT (OUTPATIENT)
Dept: PULMONOLOGY | Facility: CLINIC | Age: 63
End: 2023-08-29
Attending: INTERNAL MEDICINE
Payer: MEDICARE

## 2023-08-29 ENCOUNTER — OFFICE VISIT (OUTPATIENT)
Dept: PULMONOLOGY | Facility: CLINIC | Age: 63
End: 2023-08-29
Payer: MEDICARE

## 2023-08-29 VITALS — DIASTOLIC BLOOD PRESSURE: 76 MMHG | SYSTOLIC BLOOD PRESSURE: 137 MMHG | HEART RATE: 84 BPM | OXYGEN SATURATION: 96 %

## 2023-08-29 DIAGNOSIS — Z94.2 S/P LUNG TRANSPLANT (H): ICD-10-CM

## 2023-08-29 PROBLEM — U07.1 CLINICAL DIAGNOSIS OF COVID-19: Status: ACTIVE | Noted: 2023-03-28

## 2023-08-29 LAB
EXPTIME-PRE: 3.41 SEC
FEF2575-%PRED-PRE: 152 %
FEF2575-PRE: 3.16 L/SEC
FEF2575-PRED: 2.07 L/SEC
FEFMAX-%PRED-PRE: 75 %
FEFMAX-PRE: 4.62 L/SEC
FEFMAX-PRED: 6.09 L/SEC
FEV1-%PRED-PRE: 64 %
FEV1-PRE: 1.43 L
FEV1FEV6-PRE: 97 %
FEV1FEV6-PRED: 81 %
FEV1FVC-PRE: 97 %
FEV1FVC-PRED: 80 %
FIFMAX-PRE: 4.07 L/SEC
FVC-%PRED-PRE: 53 %
FVC-PRE: 1.48 L
FVC-PRED: 2.78 L

## 2023-08-29 PROCEDURE — 71046 X-RAY EXAM CHEST 2 VIEWS: CPT | Mod: GC | Performed by: RADIOLOGY

## 2023-08-29 PROCEDURE — 94375 RESPIRATORY FLOW VOLUME LOOP: CPT | Performed by: PHYSICIAN ASSISTANT

## 2023-08-29 PROCEDURE — 99207 PR DROP WITH A PROCEDURE: CPT | Mod: 25 | Performed by: PHYSICIAN ASSISTANT

## 2023-08-29 PROCEDURE — G0463 HOSPITAL OUTPT CLINIC VISIT: HCPCS | Performed by: PHYSICIAN ASSISTANT

## 2023-08-29 PROCEDURE — 99214 OFFICE O/P EST MOD 30 MIN: CPT | Mod: 25 | Performed by: PHYSICIAN ASSISTANT

## 2023-08-29 RX ORDER — MULTIVIT WITH MINERALS/LUTEIN
1 TABLET ORAL DAILY
Start: 2023-08-29

## 2023-08-29 NOTE — PATIENT INSTRUCTIONS
Patient Instructions  1. Continue to hydrate with 60-70 oz fluids daily.  2. Continue to exercise daily or most days of the week.  3. Follow up with your primary care provider for annual gender health maintenance procedures.  4. Follow up with colonoscopy schedule.  5. Follow up with annual dermatology visits.  6. Follow up with sleep medicine about your sleep study result.  7. Consider restarting pulmonary rehab after your next clinic visit.  8. Restart azathioprine 10 mg (10 mL) per day.   9. Get the Covid booster, flu vaccine, and RSV vaccine this Fall.  Talk to your primary care provider about this.    Next transplant clinic appointment:  3 months with CXR, labs and PFTs  Next lab draw: next week    ~~~~~~~~~~~~~~~~~~~~~~~~~    Thoracic Transplant Office phone [phone], fax [phone]    Office Hours 8:30 - 5:00     For after-hours urgent issues, please dial [phone] and ask the  to page the Thoracic Transplant Coordinator On-Call.   --------------------  To expedite your medication refill(s), please contact your pharmacy and have them fax a refill request to: [phone]    *Please allow 3 business days for routine medication refills.  *Please allow 5 business days for controlled substance medication refills.    **For Diabetic medications and supplies refill(s), please contact your pharmacy and have them contact your Endocrine team.  --------------------  For scheduling appointments call [phone].  --------------------  Please Note: If you are active on [employer], all future test results will be sent by [employer] message only, and will no longer be called to patient. You may also receive communication directly from your physician.

## 2023-08-29 NOTE — PROGRESS NOTES
Per provider, no plan to restart azathioprine at this time d/t low immuknow result.  [employer] message sent to pt to confirm plan.

## 2023-08-29 NOTE — NURSING NOTE
Transplant Coordinator Note    Reason for visit: Post lung transplant follow up visit   Coordinator: Present (Veronique in person)  Caregiver: Not present    Health concerns addressed today:  1. Respiratory: no concerns  2. Went to [facility] and [facility] recently  3. Fistula redone a couple of weeks ago  4. Sleep study completed - no CPAP needed  5. GI/: TF restarted 4 days ago at 30 ml/hr for 8 hours per day (goal is 40 ml/hr for 24 hours per day); intermittent nausea after eating; eating 1-2 times per day; BMs more formed, but continues to have diarrhea (1-2 times per day)   6. Weight: 114 lbs today    Activity/rehab: Up ad magalie, Going for walks each day  Oxygen needs: Room air  Pain management/RX: NA  Diabetic management: managed by PCP; checking twice per day  CMV status: D+/R+  Valcyte stopped: POD 8-90  EBV status: D+/R+  PJP prophylactic: Dapsone     COVID:  COVID-19 infection (yes/no, date of most recent positive test):   Status/instructions given about COVID-19 vaccine:     Pt Education: medications (use/dose/side effects), how/when to call coordinator, frequency of labs, s/s of infection/rejection, call prior to starting any new medications, lab/vital sign book    Health Maintenance:   Last colonoscopy:   Next colonoscopy due:   Dermatology: following locally  Vaccinations this visit:   Dexa scan: completed locally    Labs, CXR, PFTs reviewed with patient  Medication record reviewed and reconciled  Questions and concerns addressed    Patient Instructions  1. Continue to hydrate with 60-70 oz fluids daily.  2. Continue to exercise daily or most days of the week.  3. Follow up with your primary care provider for annual gender health maintenance procedures.  4. Follow up with colonoscopy schedule.  5. Follow up with annual dermatology visits.  6. Follow up with sleep medicine about your sleep study result.  7. Consider restarting pulmonary rehab after your next clinic visit.  8. Restart azathioprine 10 mg  (10 mL) per day.   9. Get the Covid booster, flu vaccine, and RSV vaccine this Fall.  Talk to your primary care provider about this.    Next transplant clinic appointment:  3 months with CXR, labs and PFTs  Next lab draw: next week    AVS printed at time of check out

## 2023-08-29 NOTE — RESULT ENCOUNTER NOTE
Tacrolimus level 10.2 at 12.5 hours, on 8/28/23.  Goal 8-10.     No change in dose, level close to goal   MyChart message sent

## 2023-08-29 NOTE — LETTER
8/29/2023         RE: Sofie Rodriguez  [address] [place] [address]        Dear Colleague,    Thank you for referring your patient, Sofie Rodriguez, to the [facility] [place]. Please see a copy of my visit note below.    [facility]  August 29, 2023         Assessment and Plan:   Sofie Rodriguez is a 63 year old female with h/o BSLT for COPD on 6/28/22 with course complicated by post-operative hemidiaphragm palsy, persistent pleural effusions, recurrent PNAs, positive DSA, EBV viremia, hypogammaglobulinemia, severe gastroparesis s/p G/J tube placement 7/27/22 with pyloric botox 1/25/23, GI bleed 2/2 pyloric ulcer, hemobilia s/p ERCP and MRCP, chronic diarrhea, recurrent C diff colitis, and ESRD on HD. PMH also notable for HFpEF, ASCVD, NTM colonization, hep C, and methamphetamine use. Admitted 5/17-5/23 for intolerance of tube feedings with weight loss, neutropenia, and failure to thrive. Was supposed to be readmitted for FTT in July, but she eventually refused. This is a routine follow up today.     1. S/p bilateral lung transplant:   Right hemidiaphragm palsy: no new pulmonary complaints, walking around the block for 15 minutes 1-2 times/day and was able to walk at the fair last Thursday. Sating 96% on room air. CMV 6/7 negative. ImmuKnow assay 73 on 5/18, indicative of increased risk of infection, down to 43 on recheck in June. S/p bronch on 7/12 with cultures + for H. influenzae s/p levaquin, no evidence of rejection ([id number]). CXR reviewed today and demonstrates stable changes of transplant. PFTs down 110 ml today, at her low baseline (1.4-1.5L). Completed sleep study, didn't qualify for O2, but recommendation is to consider CPAP.   - Schedule follow up with sleep to discuss possible CPAP  - Continue IS including tacrolimus (goal 8-12) and prednisone  - AZA on hold given low ImmunKnow  -  Dapsone qMWF for PJP ppx  - Will plan for resuming pulmonary rehab at next visit     2. Positive DSA: no prior treatment for AMR but has been a concern since pt. initially demonstrated rising DSA post-transplant. Ongoing positive DQB2 (MFI of 6217, down from 7847) on 7/11.   - DSA will be drawn with next labs    3. Hypogammaglobulinemia: h/o prior IVIG infusions but most recent IgG adequate at 959 on 5/18.      4. EBV viremia: last level of 24K (log 4.4) on 7/25.   - EBV pending for today     5. Severe malnutrition of chronic illness:  Gastroparesis s/p PEG/J and botox:   Pyloric ulcer:  Acute on chronic diarrhea:  Recurrent C diff colitis: chronic diarrhea since transplant with recurrent episodes of C diff. GI consulted 5/18, felt stools consistent with osmotic diarrhea. Transitioned to continuous TF, able to tolerate 30 ml/hr although noted nausea and abdominal cramping with increase to 40 ml/hr, which is her goal. Decline admission per above. Was down to 108 lbs in June, currently at 114.1 lbs (still below her recent 126 lbs). Was unable to run TF X 2 weeks following her fistula surgery, resumed at 30 ml/hr X 8 hours per day four days ago. Following with Dr. Navarro, scheduled for SIBO testing and rifaximin x 2 weeks on Friday. Diarrhea is better when she does not run TF, using imodium TID.   - Continue imodium (1 tablet) TID, pantoprazole 40 mg BID  - SIBO breath testing scheduled for Friday with rifaxamin to followw  - EGD with [facility]-POEM on 10/9    6. CKD:   HTN: BP overall controlled. Doing dialysis T/Th/Sat. Transplant pre evaluation is currently on hold for kidney until we can improve her GI issues/nutrition/weight gain    RTC: 3 months  Vaccinations: will need covid, RSV and flu vaccine this fall  Annual dermatology visit: following Derm   Preventative: DEXA > June 2025; colonoscopy completed March 2023 (diverticulosis in the sigmoid colon and colonic polyp noted s/p polypectomy)--needs repeat in 2026 per notes,  mammogram completed this past winter and was negative    Kaylin Triana PA-C  Pulmonary, Allergy, Critical Care and Sleep Medicine        Interval History:     Has gained a little bit of weight, did [place] state [place]. Physically feeling well. No recent illnesses, no fever or chills. No allergy complaints, no cough. No new shortness of breath. Doing some walking around the block (daily to BID for about 15 minutes) and some leg lifts and sit to stand. Also using 3 lbs weigt. Did complete her sleep study. No chest pain or palpitations. Running TF at 30 ml/hr X 8 hours per day for the last 4 days (but didn't run it for almost 2 weeks after her surgery for her fistula). Goal is 40 ml/hr at 24 hours per day. Still having nausea after she eats (eating 1-2 time/day) or when she does her tube feeds. No vomiting, stools have been slightly more formed, 1-2 times/day or up to 4 times/day with diarrhea, up to 6 days per week (when she is running her TF).           Review of Systems:   Please see HPI, otherwise the complete 10 point ROS is negative.           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    Hepatitis 2017    Hep C, [facility]    HTN (hypertension)     Lung infection 11/30/2022    Osteopenia      Past Surgical History:   Procedure Laterality Date    BRONCHOSCOPY (RIGID OR FLEXIBLE), DIAGNOSTIC N/A 8/2/2022    Procedure: BRONCHOSCOPY, DIAGNOSTIC- inspection Bronch;  Surgeon: Kamala Lovell MD;  [facility]    BRONCHOSCOPY (RIGID OR FLEXIBLE), DIAGNOSTIC N/A 9/13/2022    Procedure: INSPECTION BRONCHOSCOPY, WITH BRONCHOALVEOLAR LAVAGE;  Surgeon: Jose R Mccullough MD;  [facility]    BRONCHOSCOPY (RIGID OR FLEXIBLE), DIAGNOSTIC N/A 11/9/2022    Procedure: BRONCHOSCOPY, WITH BRONCHOALVEOLAR  LAVAGE AND BIOPSY;  Surgeon: Cesar Lima MD;  Location:  GI    BRONCHOSCOPY (RIGID OR FLEXIBLE), DIAGNOSTIC N/A 1/25/2023    Procedure: BRONCHOSCOPY, WITH BRONCHOALVEOLAR LAVAGE AND BIOPSY;  Surgeon: Mason Reddy MD;  Location:  GI    BRONCHOSCOPY (RIGID OR FLEXIBLE), DIAGNOSTIC N/A 4/19/2023    Procedure: BRONCHOSCOPY, WITH BRONCHOALVEOLAR LAVAGE AND BIOPSY;  Surgeon: Kamala Lovell MD;  Location:  GI    BRONCHOSCOPY (RIGID OR FLEXIBLE), DIAGNOSTIC N/A 7/12/2023    Procedure: BRONCHOSCOPY, WITH BRONCHOALVEOLAR LAVAGE AND BIOPSY;  Surgeon: Cesar Lima MD;  Location:  GI    BRONCHOSCOPY FLEXIBLE AND RIGID N/A 07/19/2022    Procedure: BRONCHOSCOPY inspection only;  Surgeon: Bob Liao MD;  Location:  GI    COLONOSCOPY  2015    CV CORONARY ANGIOGRAM N/A 06/30/2021    Procedure: CV CORONARY ANGIOGRAM;  Surgeon: Alexander Cuellar MD;  Location:  HEART CARDIAC CATH LAB    CV RIGHT HEART CATH MEASUREMENTS RECORDED N/A 06/30/2021    Procedure: CV RIGHT HEART CATH;  Surgeon: Alexander Cuellar MD;  Location:  HEART CARDIAC CATH LAB    ENDOSCOPIC RETROGRADE CHOLANGIOPANCREATOGRAM N/A 8/11/2022    Procedure: ENDOSCOPIC RETROGRADE CHOLANGIOPANCREATOGRAPHY WITH PANCREATIC DUCT NEEDLE KNIFE AND STENT PLACEMENT, BILE DUCT SPHINCTEROTOMY, BLOOD/DEBRIS REMOVAL AND STENT PLACEMENT;  Surgeon: Cosmo Arroyo MD;  Location:  OR    ENDOSCOPIC RETROGRADE CHOLANGIOPANCREATOGRAM N/A 10/7/2022    Procedure: ENDOSCOPIC RETROGRADE CHOLANGIOPANCREATOGRAPHY with biliary and pancreatic stent removal, debris removal;  Surgeon: Cosmo Arroyo MD;  Location:  OR    ENT SURGERY  1974    tonsillectomy    ENTEROSCOPY SMALL BOWEL N/A 8/11/2022    Procedure: SMALL BOWEL ENTEROSCOPY;  Surgeon: Cosmo Arroyo MD;  Location:  OR    ESOPHAGOGASTRODUODENOSCOPY, WITH NASOGASTRIC TUBE INSERTION N/A 07/01/2022    Procedure: ESOPHAGOGASTRODUODENOSCOPY, WITH NASOJEJUNAL TUBE INSERTION;   Surgeon: Ozzy Nickerson MD;  Location:  GI    ESOPHAGOSCOPY, GASTROSCOPY, DUODENOSCOPY (EGD), COMBINED N/A 8/3/2022    Procedure: ESOPHAGOGASTRODUODENOSCOPY (EGD);  Surgeon: Ira Andres MD;  Location:  GI    ESOPHAGOSCOPY, GASTROSCOPY, DUODENOSCOPY (EGD), COMBINED N/A 1/25/2023    Procedure: ESOPHAGOGASTRODUODENOSCOPY (EGD) with botox injection;  Surgeon: Gonzalez Navarro MD;  Location:  GI    HAND SURGERY      INSERT CHEST TUBE Right 9/13/2022    Procedure: Insert chest tube;  Surgeon: Jose R Mccullough MD;  Location:  GI    IR CVC TUNNEL PLACEMENT > 5 YRS OF AGE  9/26/2022    IR GASTRO JEJUNOSTOMY TUBE CHANGE  8/31/2022    IR GASTRO JEJUNOSTOMY TUBE CHANGE  12/21/2022    IR GASTRO JEJUNOSTOMY TUBE CHANGE  7/12/2023    IR GASTRO JEJUNOSTOMY TUBE CHANGE  8/18/2023    IR GASTRO JEJUNOSTOMY TUBE PLACEMENT  7/27/2022    IR THORACENTESIS  8/29/2022    LEEP TX, CERVICAL  04/07/2017    HECTOR III    LYMPH NODE BIOPSY Left 2005    Left axilla, benign- [place]    MIDLINE INSERTION - DOUBLE LUMEN Left 07/28/2022    20cm, Basilic vein    REPLACE GASTROJEJUNOSTOMY TUBE, PERCUTANEOUS  10/7/2022    Procedure: Replace Gastrojejunostomy Tube;  Surgeon: Cosmo Arroyo MD;  [facility]    THORACENTESIS Left 8/29/2022    Procedure: THORACENTESIS;  Surgeon: Bo Capone PA-C;  [place] [facility] [place]    THORACENTESIS Left 9/13/2022    Procedure: Thoracentesis;  Surgeon: Jose R Mccullough MD;  Location:  GI    THROMBECTOMY UPPER EXTREMITY Left 07/02/2022    Procedure: LEFT RADIAL ARM THROMBECTOMY;  Surgeon: Christie Graham MD;  Location:  OR    TRANSPLANT LUNG RECIPIENT SINGLE X2 Bilateral 06/28/2022    Procedure: Clamshell Incision, Bilateral Sequential Lung Transplant, On Cardiopulmonary Bypass, Flexible Bronchoscopy;  Surgeon: Sue Sunshine MD;  Location:  OR           Family History:     No family history on file.         Social History:     Social History      Socioeconomic History    Marital status: Single     Spouse name: Not on file    Number of children: Not on file    Years of education: Not on file    Highest education level: Not on file   Occupational History    Not on file   Tobacco Use    Smoking status: Former     Years: 30.00     Types: Cigarettes     Quit date: 2020     Years since quittin.7    Smokeless tobacco: Never   Substance and Sexual Activity    Alcohol use: Not Currently    Drug use: Not Currently     Types: Marijuana, Methamphetamines     Comment: hx:marijuana and methamphetamine-quit both unsure ?  2-3 yrs ago    Sexual activity: Not on file   Other Topics Concern    Parent/sibling w/ CABG, MI or angioplasty before 65F 55M? Not Asked   Social History Narrative    Not on file     Social Determinants of Health     Financial Resource Strain: Not on file   Food Insecurity: Not on file   Transportation Needs: Not on file   Physical Activity: Not on file   Stress: Not on file   Social Connections: Not on file   Intimate Partner Violence: Not on file   Housing Stability: Not on file            Medications:     Current Outpatient Medications   Medication    multivitamin (CENTRUM SILVER) tablet    alcohol swab prep pads    azaTHIOprine (IMURAN) 5 mg/mL SUSP    blood glucose (CONTOUR NEXT TEST) test strip    blood glucose (NO BRAND SPECIFIED) lancets standard    blood glucose monitoring (CONTOUR NEXT MONITOR W/DEVICE KIT) meter device kit    Calcium Carbonate-Vitamin D 600-10 MG-MCG TABS    cyanocobalamin (CYANOCOBALAMIN) 500 MCG tablet    dapsone 2 mg/mL SUSP    loperamide (IMODIUM A-D) 2 MG tablet    metoprolol tartrate (LOPRESSOR) 50 MG tablet    pantoprazole (PROTONIX) 2 mg/mL SUSP suspension    predniSONE (DELTASONE) 5 MG tablet    protein modular (PROSOURCE TF) LIQD    sevelamer carbonate, RENVELA, 0.8 GM PACK Packet    Sharps Container MISC    tacrolimus (GENERIC EQUIVALENT) 1 mg/mL suspension     No current facility-administered  medications for this visit.            Physical Exam:   /76   Pulse 84   SpO2 96%     GENERAL: alert, NAD  HEENT: NCAT, EOMI, no scleral icterus, oral mucosa moist and without lesions  Neck: no cervical or supraclavicular adenopathy  Lungs: moderate air flow, mainly clear  CV: RRR, S1S2, + murmur  Abdomen: normoactive BS, soft, non tender  Lymph: no edema  Neuro: AAO X 3, CN 2-12 grossly intact  Psychiatric: normal affect, good eye contact  Skin: no rash, jaundice or lesions on limited exam         Data:   All laboratory and imaging data reviewed.      Recent Results (from the past 168 hour(s))   Tacrolimus by [employer] Mass Spectrometry    Collection Time: 08/28/23  8:15 AM   Result Value Ref Range    Tacrolimus by [employer] Mass Spectrometry 10.2 5.0 - 15.0 ug/L    Tacrolimus Last Dose Date 8/27/2023     Tacrolimus Last Dose Time  7:45 PM    CBC with platelets    Collection Time: 08/28/23  8:15 AM   Result Value Ref Range    WBC Count (External) 5.1 3.7 - 12.1 10(3)/uL    RBC Count (External) 3.21 (L) 3.76 - 5.39 10(6)/uL    Hemoglobin (External) 11.7 11.2 - 15.8 g/dL    Hematocrit (External) 36.0 33.9 - 47.5 %    MCV (External) 112.0 (H) 80.6 - 98.5 fL    MCH (External) 36.4 (H) 26.4 - 32.9 pg    MCHC (External) 32.5 32.0 - 36.0 g/dL    RDW (External) 15.7 10.0 - 16.8 %    Platelet Count (External) 231 179 - 450 10(3)/uL   Magnesium    Collection Time: 08/28/23  8:15 AM   Result Value Ref Range    Magnesium (External) 2.5 1.8 - 2.6 mg/dL   Manual Differential    Collection Time: 08/28/23  8:15 AM   Result Value Ref Range    Method (External) Manual     % Neutrophils (External) 71.0 43.0 - 80.0 %    % Bands (External) 5.0 <=6.0 %    % Lymphocytes (External) 16.0 16.0 - 49.0 %    % Monocytes (External) 6.0 0.0 - 10.0 %    %Metamyelocyte (External) 2.0 (H) <=0.0 %    Absolute Neutrophils (External) 3.6 1.8 - 9.2 10(3)/uL    Absolute Bands (External) 0.3 10(3)/uL    Absolute Lymphocytes (External) 0.8 (L) 1.2 - 3.9  10(3)/uL    Absolute Monocytes (External) 0.3 0.0 - 1.1 10(3)/uL    Absolute Metamyelocytes (External) 0.1 (H) <=0.0 10(3)/uL   Basic metabolic panel    Collection Time: 08/28/23  8:15 AM   Result Value Ref Range    Sodium (External) 139 136 - 146 mmol/L    Potassium (External) 4.2 3.5 - 5.1 mmol/L    Chloride (External) 104 98 - 107 mmol/L    CO2 (External) 23 22 - 29 mmol/L    Anion Gap (External) 16.2 10.0 - 20.0 mmol/L    Creatinine (External) 5.47 (HH) 0.57 - 1.11 mg/dL    Urea Nitrogen (External) 26.9 (H) 10.0 - 20.0 mg/dL    BUN/Creatinine Ratio (External) 4.92 (L) 11.70 - 22.90 ratio    Calcium (External) 9.2 8.6 - 10.5 mg/dL    Glucose (External) 79 70 - 100 mg/dL    GFR Estimated (External) 8 (L) >=60 mL/min/1.73m2   General PFT Lab (Please always keep checked)    Collection Time: 08/29/23 10:40 AM   Result Value Ref Range    FVC-Pred 2.78 L    FVC-Pre 1.48 L    FVC-%Pred-Pre 53 %    FEV1-Pre 1.43 L    FEV1-%Pred-Pre 64 %    FEV1FVC-Pred 80 %    FEV1FVC-Pre 97 %    FEFMax-Pred 6.09 L/sec    FEFMax-Pre 4.62 L/sec    FEFMax-%Pred-Pre 75 %    FEF2575-Pred 2.07 L/sec    FEF2575-Pre 3.16 L/sec    HUE3664-%Pred-Pre 152 %    ExpTime-Pre 3.41 sec    FIFMax-Pre 4.07 L/sec    FEV1FEV6-Pred 81 %    FEV1FEV6-Pre 97 %     PFT interpretation:  Maneuver: valid and met ATS guidelines  Mild obstruction based on Z score  Compared to prior: FEV1 of 1.43 is 110 ml below prior  Decreased in FVC may be related to air trapping or restriction; lung volumes would be necessary to determine      Again, thank you for allowing me to participate in the care of your patient.        Sincerely,        Kaylin Triana PA-C

## 2023-09-01 ENCOUNTER — OFFICE VISIT (OUTPATIENT)
Dept: GASTROENTEROLOGY | Facility: CLINIC | Age: 63
End: 2023-09-01
Payer: MEDICARE

## 2023-09-01 VITALS
RESPIRATION RATE: 16 BRPM | HEART RATE: 82 BPM | OXYGEN SATURATION: 97 % | WEIGHT: 109 LBS | BODY MASS INDEX: 20.06 KG/M2 | DIASTOLIC BLOOD PRESSURE: 78 MMHG | TEMPERATURE: 98.6 F | SYSTOLIC BLOOD PRESSURE: 144 MMHG | HEIGHT: 62 IN

## 2023-09-01 DIAGNOSIS — K90.9 DIARRHEA DUE TO MALABSORPTION: ICD-10-CM

## 2023-09-01 DIAGNOSIS — R19.7 DIARRHEA DUE TO MALABSORPTION: ICD-10-CM

## 2023-09-01 PROCEDURE — 91065 BREATH HYDROGEN/METHANE TEST: CPT

## 2023-09-01 ASSESSMENT — PAIN SCALES - GENERAL: PAINLEVEL: NO PAIN (0)

## 2023-09-01 NOTE — PROGRESS NOTES
Non-Invasive GI Procedure Visit    Sofie Rodriguez is a 63 year old female with history of Diarrhea due to malabsorption.   Patient stated reason for procedure: Diarrhea and Bloating      COVID-19 PCR Results          7/14/2022    08:16 7/29/2022    14:49 8/5/2022    15:32 9/9/2022    18:11 9/21/2022    14:42 9/29/2022    09:50 11/30/2022    10:08 12/16/2022    08:02 3/6/2023    23:04   COVID-19 PCR Results   COVID-19 Virus PCR to [facility] - Result        Undetected        COVID-19 Virus by PCR (External Result)       Negative     Undetected     Negative       SARS CoV2 PCR Negative  Negative  Negative  Negative  Negative  Negative             5/17/2023    17:01   COVID-19 PCR Results   COVID-19 Virus PCR to [facility] - Result    COVID-19 Virus by PCR (External Result)    SARS CoV2 PCR Negative       Details          This result is from an external source.             COVID-19 Antibody Results, Testing for Immunity          4/29/2022    11:46   COVID-19 Antibody Results, Testing for Immunity   SARS-CoV-2 Nucleocapsid Total Ab, S Negative        Pre-Procedure Assessment  Patient presents to clinic today for Hydrogen Breath Test    Referring Provider: Dr Navarro    Previous HBT: No  Is patient a smoker: No    Does patient report having a colonoscopy, barium study, barium enema or taking antibiotics within the last 2 weeks? Yes / No: No.  Does patient report taking a stool softener, fiber supplement, laxative or anti-diarrheal in the last 3 - 4 days? Yes / No: No. (Except imodium 2 days ago, was ok'd by provider, see previous encounter)  Does the patient report taking a probiotic in the last 1 - 2 days? Yes / No: No. (Does the patient report taking any medications today? No (Except Pt did take anti-rejection medications per provider rec as well)    Does the patient report following the pre-procedure bland diet? Yes  Does patient report being NPO for a minimum of 12 hours before the test? Yes.     Patient reported symptoms:  "bloating, nausea  How long has patient had these symptoms? Since May 2023    .    Patient Hx  Patient's history, medications and allergies were reviewed.     Height: 5' 2\"   Weight: 109 lbs 0 oz    Patient Active Problem List    Diagnosis Date Noted    Diarrhea of presumed infectious origin 05/17/2023     Priority: Medium    Lung transplant status, bilateral (H) 05/17/2023     Priority: Medium    Failure to thrive in adult 05/17/2023     Priority: Medium    ESRD (end stage renal disease) on dialysis (H) 05/17/2023     Priority: Medium    C. difficile colitis 05/17/2023     Priority: Medium    Leukopenia, unspecified type 05/17/2023     Priority: Medium    Clinical diagnosis of COVID-19 03/28/2023     Priority: Medium    Gastroparesis 03/01/2023     Priority: Medium    Lung infection 11/30/2022     Priority: Medium    Transplant rejection 09/09/2022     Priority: Medium    Hemoptysis 09/09/2022     Priority: Medium    Pulmonary edema 09/09/2022     Priority: Medium    Stage 3b chronic kidney disease (H) 09/07/2022     Priority: Medium    Anemia of chronic renal failure, stage 3b (H) 09/07/2022     Priority: Medium    Gastrojejunostomy tube status (H) 08/31/2022     Priority: Medium    Anemia 08/29/2022     Priority: Medium    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) 08/17/2022     Priority: Medium    Administration of long-term prophylactic antibiotics 08/16/2022     Priority: Medium    EBV (Vibha-Barr virus) viremia 08/16/2022     Priority: Medium    Acute pylorus ulcer 08/16/2022     Priority: Medium    Hypogammaglobulinemia (H) 08/16/2022     Priority: Medium    Diaphragm dysfunction 07/19/2022     Priority: Medium     SNIFF test 7/14/22      Paroxysmal A-fib (H) 07/17/2022     Priority: Medium    Acute kidney failure with tubular necrosis (H) 07/12/2022     Priority: Medium    Thrombus of left radial artery (H) 07/01/2022     Priority: Medium     S/p radial thrombectomy 7/1/22      S/P " lung transplant (H) 06/29/2022     Priority: Medium     (Note: This summary should only be edited by a member of the lung transplant team. Thanks!)    Transplant providers, see [facility] for additional detailed information      Transplant Hospitalization Summary:  Bilateral Sequential Lung Transplant on 6/28/22    Involved in a trial (ex. INSPIRE, EXPAND)?: No    DONOR - CDC Increased Risk:  No      PATIENT Information:    Serologies:      Donor Recipient Intervention   CMV status Positive Positive Valganciclovir POD #8-90   EBV status Positive Positive EBV check monthly   HSV status N/A Positive None     Transplant Complications:   PRE-op (Y/N) POST-op (Y/N) ADDITIONAL INFO   Trach N N    Vent support N N/A    Chest tube N N/A    ECMO N N      Primary Graft Dysfunction Documentation:    POD #0  (~0 hours) POD #1  (~24 hours)   Date 6/29/22 6/30/22   Time 0535 0356   Intubated Y Y   PaO2 163 184   FiO2 40% 40   P/F Ratio 408 460   PGD Grade   (0=mild, 3=severe) 0 0   ECMO N N   Inhaled NO/Flolan N N   ISHLT PGD Scoring  Grade 0 - PaO2/FiO2 >300 and normal chest radiograph (must be extubated to be Grade 0)  Grade 1 - PaO2/FiO2 >300 and diffuse allograft infiltrates on chest radiograph  Grade 2 - PaO2/FiO2 between 200 and 300  Grade 3 - PaO2/FiO2 <200    Post-Op Data:  Complication Y/N Date Comments   Date of Extubation(s) N/A 6/30/22    Return to OR? N     Reintubated? N     Date Last Chest Tube Removed N/A 8/6/22    Rejection? N     Afib? Y 7/14/22 See EP note 7/17/22   Renal failure? N     HCAP? N     DVT/PE? Y  (Arterial) thrombus L radial  - s/p thrombectomy 7/2/22   Native lung pathology results N/A N/A      Discharge:  Discharge to: [place] [facility]   Discharge date: 8/17/22        Acute post-operative pain 06/29/2022     Priority: Medium    Immunosuppressed status (H) 06/29/2022     Priority: Medium    Hepatitis C, chronic (H) 11/11/2021     Priority: Medium    Status post coronary angiogram 06/30/2021      Priority: Medium    COPD (chronic obstructive pulmonary disease) (H) 04/14/2021     Priority: Medium     Added automatically from request for surgery [id number]      Abnormal findings on diagnostic imaging of cardiovascular system 04/14/2021     Priority: Medium     Added automatically from request for surgery [id number]        Prior to Admission medications    Medication Sig Start Date End Date Taking? Authorizing Provider   alcohol swab prep pads Use to swab area of injection/amos as directed. 8/17/22   Susan Delacruz MD   azaTHIOprine (IMURAN) 5 mg/mL SUSP 20 mLs (100 mg) by Per J Tube route daily ON HOLD 5/8/2023 FOR LOW WBC 5/9/23   Kaylin Triana PA-C   blood glucose (CONTOUR NEXT TEST) test strip Use to test blood sugar 4 times daily, as directed. Okay to use whatever brand insurance will cover. 12/7/22   Kaylin Triana PA-C   blood glucose (NO BRAND SPECIFIED) lancets standard Use to test blood sugar 4 times daily as directed. Okay to use whatever brand insurance will cover. 12/7/22   Kaylin Triana PA-C   blood glucose monitoring (CONTOUR NEXT MONITOR W/DEVICE KIT) meter device kit Use to test blood sugar 4 times daily or as directed. 8/17/22   Susan Delacruz MD   Calcium Carbonate-Vitamin D 600-10 MG-MCG TABS 1 tablet by Per J Tube route 3 times daily 6/20/23   Claribel Franks MD   cyanocobalamin (CYANOCOBALAMIN) 500 MCG tablet 1 tablet (500 mcg) by Per Feeding Tube route daily 10/8/22   Greg Pritchard MD   dapsone 2 mg/mL SUSP 25 mLs (50 mg) by Per J Tube route Every Mon, Wed, Fri Morning 10/10/22   Greg Pritchard MD   loperamide (IMODIUM A-D) 2 MG tablet 1 tablet (2 mg) by Per J Tube route 4 times daily as needed for diarrhea 10/8/22   Nguyen Johnson, CNP   metoprolol tartrate (LOPRESSOR) 50 MG tablet 0.5 tablets (25 mg) by Per Feeding Tube route 2 times daily 11/25/22   Claribel Franks MD   multivitamin (CENTRUM SILVER) tablet Take 1 tablet by mouth  daily 8/29/23   Kaylin Triana PA-C   pantoprazole (PROTONIX) 2 mg/mL SUSP suspension 20 mLs (40 mg) by Per J Tube route 2 times daily 9/2/22   Claribel Franks MD   predniSONE (DELTASONE) 5 MG tablet Take 1 tab (5mg) at AM and 1/2 tab (2.5) in pm 6/13/23   Claribel Franks MD   protein modular (PROSOURCE TF) LIQD 1 packet by Per Feeding Tube route daily  Patient taking differently: 1 packet by Per Feeding Tube route daily at 2 pm 11/14/22   Claribel Franks MD   sevelamer carbonate, RENVELA, 0.8 GM PACK Packet Take 1 packet (0.8 g) by mouth 3 times daily (with meals) 7/25/23   Kaylin Triana PA-C   Sharps Container MISC 1 sharps container 10/11/22   Claribel Franks MD   tacrolimus (GENERIC EQUIVALENT) 1 mg/mL suspension Take 4 mLs (4 mg) by mouth 2 times daily 8/16/23   Claribel Franks MD   albuterol (PROAIR HFA/PROVENTIL HFA/VENTOLIN HFA) 108 (90 BASE) MCG/ACT Inhaler Inhale 2 puffs into the lungs every 6 hours as needed for shortness of breath / dyspnea or wheezing  8/15/22  Reported, Patient   furosemide (LASIX) 20 MG tablet Take 1 tablet (20 mg) by mouth daily 9/8/22 10/8/22  Kaylin Triana PA-C   insulin glargine (LANTUS PEN) 100 UNIT/ML pen Inject 7 Units Subcutaneous 2 times daily 8/16/22 10/8/22  Susan Delacruz MD   ipratropium - albuterol 0.5 mg/2.5 mg/3 mL (DUONEB) 0.5-2.5 (3) MG/3ML neb solution Inhale 1 vial into the lungs every 4 hours as needed for shortness of breath / dyspnea 6/21/22 8/15/22  Unknown, Entered By History   mycophenolate (GENERIC EQUIVALENT) 200 MG/ML suspension 3.75 mLs (750 mg) by Per J Tube route 2 times daily 9/2/22 10/8/22  Claribel Franks MD   valGANciclovir (VALCYTE) 50 MG/ML solution 9 mLs (450 mg) by Oral or Feeding Tube route three times a week  Patient taking differently: 450 mg by Oral or Feeding Tube route three times a week Take on Mon/Wed/Fri 9/2/22 10/8/22  Claribel Franks MD     Allergies   Allergen Reactions    Blood  Transfusion Related (Informational Only) Other (See Comments)     Patient has a history of a clinically significant antibody against RBC antigens.  A delay in compatible RBCs may occur.      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) 8/17/2022    Hepatitis 2017    Hep C, [facility]    HTN (hypertension)     Lung infection 11/30/2022    Osteopenia      Past Surgical History:   Procedure Laterality Date    BRONCHOSCOPY (RIGID OR FLEXIBLE), DIAGNOSTIC N/A 8/2/2022    Procedure: BRONCHOSCOPY, DIAGNOSTIC- inspection Bronch;  Surgeon: Kamala Lovell MD;  Location: [facility]    BRONCHOSCOPY (RIGID OR FLEXIBLE), DIAGNOSTIC N/A 9/13/2022    Procedure: INSPECTION BRONCHOSCOPY, WITH BRONCHOALVEOLAR LAVAGE;  Surgeon: Jose R Mccullough MD;  [facility]    BRONCHOSCOPY (RIGID OR FLEXIBLE), DIAGNOSTIC N/A 11/9/2022    Procedure: BRONCHOSCOPY, WITH BRONCHOALVEOLAR LAVAGE AND BIOPSY;  Surgeon: Cesar Lima MD;  [facility]    BRONCHOSCOPY (RIGID OR FLEXIBLE), DIAGNOSTIC N/A 1/25/2023    Procedure: BRONCHOSCOPY, WITH BRONCHOALVEOLAR LAVAGE AND BIOPSY;  Surgeon: Mason Reddy MD;  [facility]    BRONCHOSCOPY (RIGID OR FLEXIBLE), DIAGNOSTIC N/A 4/19/2023    Procedure: BRONCHOSCOPY, WITH BRONCHOALVEOLAR LAVAGE AND BIOPSY;  Surgeon: Kaamla Lovell MD;  [facility]    BRONCHOSCOPY (RIGID OR FLEXIBLE), DIAGNOSTIC N/A 7/12/2023    Procedure: BRONCHOSCOPY, WITH BRONCHOALVEOLAR LAVAGE AND BIOPSY;  Surgeon: Cesar Lima MD;  [facility]    BRONCHOSCOPY FLEXIBLE AND RIGID N/A 07/19/2022    Procedure: BRONCHOSCOPY inspection only;  Surgeon: Bob Liao MD;  [facility]    COLONOSCOPY  2015    CV CORONARY ANGIOGRAM N/A 06/30/2021    Procedure: CV CORONARY ANGIOGRAM;  Surgeon: Alexander Cuellar MD;   Location:  [facility]ATH [facility]    CV RIGHT HEART CATH MEASUREMENTS RECORDED N/A 06/30/2021    Procedure: CV RIGHT HEART CATH;  Surgeon: Alexander Cuellar MD;  Location:  [facility] CATH [facility]    ENDOSCOPIC RETROGRADE CHOLANGIOPANCREATOGRAM N/A 8/11/2022    Procedure: ENDOSCOPIC RETROGRADE CHOLANGIOPANCREATOGRAPHY WITH PANCREATIC DUCT NEEDLE KNIFE AND STENT PLACEMENT, BILE DUCT SPHINCTEROTOMY, BLOOD/DEBRIS REMOVAL AND STENT PLACEMENT;  Surgeon: Cosmo Arroyo MD;  [facility]    ENDOSCOPIC RETROGRADE CHOLANGIOPANCREATOGRAM N/A 10/7/2022    Procedure: ENDOSCOPIC RETROGRADE CHOLANGIOPANCREATOGRAPHY with biliary and pancreatic stent removal, debris removal;  Surgeon: Cosmo Arroyo MD;  [facility]    ENT SURGERY  1974    tonsillectomy    ENTEROSCOPY SMALL BOWEL N/A 8/11/2022    Procedure: SMALL BOWEL ENTEROSCOPY;  Surgeon: Cosmo Arroyo MD;  [facility]    ESOPHAGOGASTRODUODENOSCOPY, WITH NASOGASTRIC TUBE INSERTION N/A 07/01/2022    Procedure: ESOPHAGOGASTRODUODENOSCOPY, WITH NASOJEJUNAL TUBE INSERTION;  Surgeon: Ozzy Nickerson MD;  Location:  GI    ESOPHAGOSCOPY, GASTROSCOPY, DUODENOSCOPY (EGD), COMBINED N/A 8/3/2022    Procedure: ESOPHAGOGASTRODUODENOSCOPY (EGD);  Surgeon: Ira Andres MD;  Location:  GI    ESOPHAGOSCOPY, GASTROSCOPY, DUODENOSCOPY (EGD), COMBINED N/A 1/25/2023    Procedure: ESOPHAGOGASTRODUODENOSCOPY (EGD) with botox injection;  Surgeon: Gonzalez Navarro MD;  Location:  GI    HAND SURGERY      INSERT CHEST TUBE Right 9/13/2022    Procedure: Insert chest tube;  Surgeon: Jose R Mccullough MD;  Location:  GI    IR CVC TUNNEL PLACEMENT > 5 YRS OF AGE  9/26/2022    IR GASTRO JEJUNOSTOMY TUBE CHANGE  8/31/2022    IR GASTRO JEJUNOSTOMY TUBE CHANGE  12/21/2022    IR GASTRO JEJUNOSTOMY TUBE CHANGE  7/12/2023    IR GASTRO JEJUNOSTOMY TUBE CHANGE  8/18/2023    IR GASTRO JEJUNOSTOMY TUBE PLACEMENT  7/27/2022    IR THORACENTESIS  8/29/2022     LEEP TX, CERVICAL  2017    HECTOR III    LYMPH NODE BIOPSY Left     Left axilla, benign- [place]    MIDLINE INSERTION - DOUBLE LUMEN Left 2022    20cm, Basilic vein    REPLACE GASTROJEJUNOSTOMY TUBE, PERCUTANEOUS  10/7/2022    Procedure: Replace Gastrojejunostomy Tube;  Surgeon: Cosmo Arroyo MD;  [facility]    THORACENTESIS Left 2022    Procedure: THORACENTESIS;  Surgeon: Bo Capone PA-C;  Location: [facility]    THORACENTESIS Left 2022    Procedure: Thoracentesis;  Surgeon: Jose R Mccullough MD;  [facility]    THROMBECTOMY UPPER EXTREMITY Left 2022    Procedure: LEFT RADIAL ARM THROMBECTOMY;  Surgeon: Christie Graham MD;  Location:  OR    TRANSPLANT LUNG RECIPIENT SINGLE X2 Bilateral 2022    Procedure: Clamshell Incision, Bilateral Sequential Lung Transplant, On Cardiopulmonary Bypass, Flexible Bronchoscopy;  Surgeon: Sue Sunshine MD;  Location:  OR     No family history on file.  Social History     Tobacco Use    Smoking status: Former     Years: 30.00     Types: Cigarettes     Quit date: 2020     Years since quittin.8    Smokeless tobacco: Never   Substance Use Topics    Alcohol use: Not Currently        Pre-Procedure Education & Consent  Procedure education was provided to: Patient  Teaching method: Explanation  Barriers to learning: No Barrier    Patient indicated understanding of pre-procedure instruction and appropriate consent was obtained and documented.    ____________________________________________________________________    Post-Procedure Documentation: Hydrogen Breath Test     Baseline breath obtained prior to patient drinking Lactulose.     Patient tolerated test with no complaints.    HBT Results    Sample Clock Times Ppm H2 Ppm CH4 CO2% Comments   Baseline 1000 9 3 4.9     Challenge Dose Given 1005 - - - -   #1 1025 16 3 5.1     #2 1045 34 4 5.0     #3 1105 42 5 5.3     #4 1125 50 6 4.7     #5 1145 48 6  4.7     #6 1205 53 6 5.0     #7 1225 49 6 4.8     #8 1245 52 6 5.0     #9 1305 49 5 5.2       #  B   Patient given discharge instructions.    Notification of pending test results sent to provider for interpretation. Please reference scanned document for final interpretation of results. Patient will follow up with referring provider for test results.    Rain Yarbrough RN on 9/1/2023 at 9:54 AM

## 2023-09-01 NOTE — PATIENT INSTRUCTIONS
Hydrogen Breath Test  1. You may experience diarrhea for the next 12-24 hours.  2. Resume a regular diet.  3. Follow-up with your referring doctor in clinic.  4. If you have questions call [phone] from 7:00am-5:00pm.

## 2023-09-05 NOTE — RESULT ENCOUNTER NOTE
Breath testing positive for  SIBO as expected. Patient to start Rifaximin 550 mg TID x 2 week. Will follow up on diarrhea symptoms at the time of her currently scheduled G-POEM next month.    Gonzalez Navarro MD  [facility]  Division of Gastroenterology and Hepatology  [facility] [address] [place], [place] [address]

## 2023-09-06 ENCOUNTER — LAB (OUTPATIENT)
Dept: LAB | Facility: CLINIC | Age: 63
End: 2023-09-06
Payer: MEDICARE

## 2023-09-06 ENCOUNTER — TELEPHONE (OUTPATIENT)
Dept: GASTROENTEROLOGY | Facility: CLINIC | Age: 63
End: 2023-09-06

## 2023-09-06 ENCOUNTER — APPOINTMENT (OUTPATIENT)
Dept: LAB | Facility: CLINIC | Age: 63
End: 2023-09-06
Payer: MEDICARE

## 2023-09-06 ENCOUNTER — TELEPHONE (OUTPATIENT)
Dept: GASTROENTEROLOGY | Facility: CLINIC | Age: 63
End: 2023-09-06
Payer: MEDICARE

## 2023-09-06 DIAGNOSIS — Z94.2 S/P LUNG TRANSPLANT (H): ICD-10-CM

## 2023-09-06 LAB
ACID FAST STAIN (ARUP): NORMAL

## 2023-09-06 PROCEDURE — 86833 HLA CLASS II HIGH DEFIN QUAL: CPT

## 2023-09-06 PROCEDURE — 86832 HLA CLASS I HIGH DEFIN QUAL: CPT

## 2023-09-06 PROCEDURE — 80197 ASSAY OF TACROLIMUS: CPT

## 2023-09-06 NOTE — TELEPHONE ENCOUNTER
DARNELL to see if patient would like to reschedule her procedure with Dr. Navarro from 10/9 to 10/6.

## 2023-09-06 NOTE — TELEPHONE ENCOUNTER
[facility] Prior Authorization Team   Phone: [phone]  Fax: [phone]    PA Initiation    Medication: XIFAXAN 550 MG PO TABS  Insurance Company: [employer] - Phone [phone] Fax [phone]  Pharmacy Filling the Rx: [facility] - [place], [place] - [facility] [address]  Filling Pharmacy Phone: [phone]  Filling Pharmacy Fax: [phone]  Start Date: 9/6/2023

## 2023-09-07 DIAGNOSIS — K63.8219 SMALL INTESTINAL BACTERIAL OVERGROWTH (SIBO): Primary | ICD-10-CM

## 2023-09-07 LAB
TACROLIMUS BLD-MCNC: 8.8 UG/L (ref 5–15)
TME LAST DOSE: NORMAL H
TME LAST DOSE: NORMAL H

## 2023-09-07 NOTE — TELEPHONE ENCOUNTER
PRIOR AUTHORIZATION DENIED    Medication: XIFAXAN 550 MG PO TABS  Insurance Company: [employer] - Phone [phone] Fax [phone]  Denial Date: 9/6/2023  Denial Rational: This drug used for Intestinal Malabsorption Unspecified is not an approved use.  Appeal Information: [employer] Phone: [phone]  Patient Notified: Yes

## 2023-09-08 ENCOUNTER — MYC MEDICAL ADVICE (OUTPATIENT)
Dept: GASTROENTEROLOGY | Facility: CLINIC | Age: 63
End: 2023-09-08
Payer: MEDICARE

## 2023-09-08 NOTE — TELEPHONE ENCOUNTER
Yeah, I think the disease it was associated with was not correct. I wrote a new prescription associated with SIBO which is an approved indication. Hopefully that works. Otherwise we'll have to appeal, which never works or try a different antibiotic regimen.     Gonzalez

## 2023-09-14 ENCOUNTER — TELEPHONE (OUTPATIENT)
Dept: GASTROENTEROLOGY | Facility: CLINIC | Age: 63
End: 2023-09-14
Payer: MEDICARE

## 2023-09-14 LAB
DONOR IDENTIFICATION: NORMAL
DQB2: 7208
DSA COMMENTS: NORMAL
DSA PRESENT: YES
DSA TEST METHOD: NORMAL
ORGAN: NORMAL
SA 1 CELL: NORMAL
SA 1 TEST METHOD: NORMAL
SA 2 CELL: NORMAL
SA 2 TEST METHOD: NORMAL
SA1 HI RISK ABY: NORMAL
SA1 MOD RISK ABY: NORMAL
SA2 HI RISK ABY: NORMAL
SA2 MOD RISK ABY: NORMAL
UNACCEPTABLE ANTIGENS: NORMAL
UNOS CPRA: 37
ZZZSA 1  COMMENTS: NORMAL
ZZZSA 2 COMMENTS: NORMAL

## 2023-09-14 NOTE — TELEPHONE ENCOUNTER
Rifaximin medication prescription placed and sent to [facility], [place]. They are asking for prior authorization. Please follow up with this pharmacy.

## 2023-09-14 NOTE — TELEPHONE ENCOUNTER
Prior Authorization Not Processed    Medication: XIFAXAN 550 MG PO TABS  Insurance Company: [employer] - Phone [phone] Fax [phone]  Expected CoPay:      Pharmacy Filling the Rx: [id number] - [place], [place] - [address]  Pharmacy Notified:    Patient Notified:      Due to recent denial, unable to send in new case.  See encounter on 9/6/2023

## 2023-09-15 DIAGNOSIS — K63.8219 SMALL INTESTINAL BACTERIAL OVERGROWTH (SIBO): Primary | ICD-10-CM

## 2023-09-15 RX ORDER — CIPROFLOXACIN 500 MG/1
500 TABLET, FILM COATED ORAL 2 TIMES DAILY
Qty: 28 TABLET | Refills: 0 | Status: SHIPPED | OUTPATIENT
Start: 2023-09-15 | End: 2023-09-29

## 2023-09-15 NOTE — PROGRESS NOTES
Rifaximin denied for SIBO by insurance. Will try cipro 500 BID x 14 days as alternate therapy for SIBO.    Gonzalez Navarro MD  [facility]  Division of Gastroenterology and Hepatology  [facility] [address] [place], [place] [address]

## 2023-09-25 ENCOUNTER — TRANSFERRED RECORDS (OUTPATIENT)
Dept: HEALTH INFORMATION MANAGEMENT | Facility: CLINIC | Age: 63
End: 2023-09-25
Payer: MEDICARE

## 2023-09-25 DIAGNOSIS — Z94.2 S/P LUNG TRANSPLANT (H): ICD-10-CM

## 2023-10-04 ENCOUNTER — PATIENT OUTREACH (OUTPATIENT)
Dept: GASTROENTEROLOGY | Facility: CLINIC | Age: 63
End: 2023-10-04
Payer: MEDICARE

## 2023-10-04 NOTE — OR NURSING
Meds DOS  Received: Today  Andreia Alva, Penny Loja RN Hello,    Pt is scheduled for ESOPHAGOGASTRODUODENOSCOPY, General  MYOTOMY, ESOPHAGUS, ENDOSCOPIC, ORAL APPROACH, General  Possible GJ tube exchange to add to current order, General on 10/9/23.    Note in chart today says NPO per the gastroparesis guidelines of liquids X 48 hours and NPO liquids 8 hours prior.    Pt has  history of lung transplant. We would normally advise pt to take anti-rejection meds and Metoprolol on DOS. Is this allowed? I am not sure if she take her meds by mouth or G tube.    Kind regards,    Andreia Alva RN< BSN  Pre-Anesthesia Screening  [phone] Office

## 2023-10-04 NOTE — TELEPHONE ENCOUNTER
Pt called with question about arrival time for procedure on 10/9, discussed that she will get a pre op nursing call to go over details. Shared arrival time and NPO per the gastroparesis guidelines of liquids X 48 hours and NPO liquids 8 hours prior.    Pt still experiencing diarrhea, no real improvement with the Cipro/SIBO treatment. No significant weight loss. Message routed to Dr Navarro as update.    Follow up visit arranged for 11/29 at 11:40am, video visit. Message routed to clinic coordinators.     Penny Vogel, RN, BSN,   [facility]  Care coordinator

## 2023-10-05 NOTE — OR NURSING
RE: Meds DOS  Received: Yesterday  Penny Vogel, Andreia Benz RN Hi Judy,    I just spoke with her today and did advise to take important meds on DOS, like her antirejections. She takes via her J tube and said she would do minimal water flushes before and after. That should be fine, especially given it's going into her jejunum.    Teresa          Previous Messages       ----- Message -----  From: Andreia Alva RN  Sent: 10/4/2023   4:12 PM CDT  To: Penny Vogel RN  Subject: Meds DOS                                        Hello,    Pt is scheduled for ESOPHAGOGASTRODUODENOSCOPY, General  MYOTOMY, ESOPHAGUS, ENDOSCOPIC, ORAL APPROACH, General  Possible GJ tube exchange to add to current order, General on 10/9/23.    Note in chart today says NPO per the gastroparesis guidelines of liquids X 48 hours and NPO liquids 8 hours prior.    Pt has  history of lung transplant. We would normally advise pt to take anti-rejection meds and Metoprolol on DOS. Is this allowed? I am not sure if she take her meds by mouth or G tube.    Kind regards,    Andreia Alva RN< BSN  Pre-Anesthesia Screening  [phone] Office

## 2023-10-06 ENCOUNTER — ANESTHESIA EVENT (OUTPATIENT)
Dept: SURGERY | Facility: CLINIC | Age: 63
End: 2023-10-06
Payer: MEDICARE

## 2023-10-09 ENCOUNTER — ANESTHESIA (OUTPATIENT)
Dept: SURGERY | Facility: CLINIC | Age: 63
End: 2023-10-09
Payer: MEDICARE

## 2023-10-09 ENCOUNTER — HOSPITAL ENCOUNTER (OUTPATIENT)
Facility: CLINIC | Age: 63
Setting detail: OBSERVATION
Discharge: HOME | End: 2023-10-10
Attending: INTERNAL MEDICINE | Admitting: INTERNAL MEDICINE
Payer: MEDICARE

## 2023-10-09 DIAGNOSIS — Z94.2 S/P LUNG TRANSPLANT (H): ICD-10-CM

## 2023-10-09 DIAGNOSIS — K63.8219 SMALL INTESTINAL BACTERIAL OVERGROWTH (SIBO): Primary | ICD-10-CM

## 2023-10-09 LAB
GLUCOSE BLDC GLUCOMTR-MCNC: 102 MG/DL (ref 70–99)
UPPER GI ENDOSCOPY: NORMAL

## 2023-10-09 PROCEDURE — 250N000025 HC SEVOFLURANE, PER MIN: Performed by: INTERNAL MEDICINE

## 2023-10-09 PROCEDURE — 82962 GLUCOSE BLOOD TEST: CPT

## 2023-10-09 PROCEDURE — C9113 INJ PANTOPRAZOLE SODIUM, VIA: HCPCS | Mod: JZ | Performed by: STUDENT IN AN ORGANIZED HEALTH CARE EDUCATION/TRAINING PROGRAM

## 2023-10-09 PROCEDURE — 370N000017 HC ANESTHESIA TECHNICAL FEE, PER MIN: Performed by: INTERNAL MEDICINE

## 2023-10-09 PROCEDURE — 250N000009 HC RX 250: Performed by: NURSE ANESTHETIST, CERTIFIED REGISTERED

## 2023-10-09 PROCEDURE — 250N000011 HC RX IP 250 OP 636: Performed by: NURSE ANESTHETIST, CERTIFIED REGISTERED

## 2023-10-09 PROCEDURE — 250N000009 HC RX 250: Performed by: INTERNAL MEDICINE

## 2023-10-09 PROCEDURE — 710N000010 HC RECOVERY PHASE 1, LEVEL 2, PER MIN: Performed by: INTERNAL MEDICINE

## 2023-10-09 PROCEDURE — 360N000083 HC SURGERY LEVEL 3 W/ FLUORO, PER MIN: Performed by: INTERNAL MEDICINE

## 2023-10-09 PROCEDURE — 99222 1ST HOSP IP/OBS MODERATE 55: CPT | Mod: AI | Performed by: INTERNAL MEDICINE

## 2023-10-09 PROCEDURE — 250N000011 HC RX IP 250 OP 636: Mod: JZ | Performed by: STUDENT IN AN ORGANIZED HEALTH CARE EDUCATION/TRAINING PROGRAM

## 2023-10-09 PROCEDURE — 250N000013 HC RX MED GY IP 250 OP 250 PS 637: Performed by: STUDENT IN AN ORGANIZED HEALTH CARE EDUCATION/TRAINING PROGRAM

## 2023-10-09 PROCEDURE — 250N000012 HC RX MED GY IP 250 OP 636 PS 637: Performed by: INTERNAL MEDICINE

## 2023-10-09 PROCEDURE — 250N000012 HC RX MED GY IP 250 OP 636 PS 637: Performed by: STUDENT IN AN ORGANIZED HEALTH CARE EDUCATION/TRAINING PROGRAM

## 2023-10-09 PROCEDURE — 96374 THER/PROPH/DIAG INJ IV PUSH: CPT | Mod: XU

## 2023-10-09 PROCEDURE — 272N000001 HC OR GENERAL SUPPLY STERILE: Performed by: INTERNAL MEDICINE

## 2023-10-09 PROCEDURE — 250N000013 HC RX MED GY IP 250 OP 250 PS 637: Performed by: INTERNAL MEDICINE

## 2023-10-09 PROCEDURE — 250N000011 HC RX IP 250 OP 636: Mod: JZ | Performed by: INTERNAL MEDICINE

## 2023-10-09 PROCEDURE — 999N000141 HC STATISTIC PRE-PROCEDURE NURSING ASSESSMENT: Performed by: INTERNAL MEDICINE

## 2023-10-09 PROCEDURE — G0378 HOSPITAL OBSERVATION PER HR: HCPCS

## 2023-10-09 PROCEDURE — 258N000003 HC RX IP 258 OP 636: Performed by: NURSE ANESTHETIST, CERTIFIED REGISTERED

## 2023-10-09 RX ORDER — NALOXONE HYDROCHLORIDE 0.4 MG/ML
0.2 INJECTION, SOLUTION INTRAMUSCULAR; INTRAVENOUS; SUBCUTANEOUS
Status: DISCONTINUED | OUTPATIENT
Start: 2023-10-09 | End: 2023-10-10 | Stop reason: HOSPADM

## 2023-10-09 RX ORDER — LIDOCAINE 40 MG/G
CREAM TOPICAL
Status: DISCONTINUED | OUTPATIENT
Start: 2023-10-09 | End: 2023-10-09 | Stop reason: HOSPADM

## 2023-10-09 RX ORDER — PREDNISONE 5 MG/ML
2.5 SOLUTION ORAL EVERY EVENING
Status: DISCONTINUED | OUTPATIENT
Start: 2023-10-09 | End: 2023-10-10 | Stop reason: HOSPADM

## 2023-10-09 RX ORDER — MULTIPLE VITAMINS W/ MINERALS TAB 9MG-400MCG
1 TAB ORAL DAILY
Status: DISCONTINUED | OUTPATIENT
Start: 2023-10-10 | End: 2023-10-10 | Stop reason: HOSPADM

## 2023-10-09 RX ORDER — ONDANSETRON 4 MG/1
4 TABLET, ORALLY DISINTEGRATING ORAL EVERY 6 HOURS PRN
Status: DISCONTINUED | OUTPATIENT
Start: 2023-10-09 | End: 2023-10-10 | Stop reason: HOSPADM

## 2023-10-09 RX ORDER — LIDOCAINE HYDROCHLORIDE 20 MG/ML
INJECTION, SOLUTION INFILTRATION; PERINEURAL PRN
Status: DISCONTINUED | OUTPATIENT
Start: 2023-10-09 | End: 2023-10-09

## 2023-10-09 RX ORDER — VIT B COMP NO.3/FOLIC/C/BIOTIN 1 MG-60 MG
1 TABLET ORAL EVERY MORNING
Status: DISCONTINUED | OUTPATIENT
Start: 2023-10-10 | End: 2023-10-10 | Stop reason: HOSPADM

## 2023-10-09 RX ORDER — UREA 10 %
500 LOTION (ML) TOPICAL DAILY
Status: DISCONTINUED | OUTPATIENT
Start: 2023-10-10 | End: 2023-10-10 | Stop reason: HOSPADM

## 2023-10-09 RX ORDER — SEVELAMER CARBONATE FOR ORAL SUSPENSION 800 MG/1
0.8 POWDER, FOR SUSPENSION ORAL
Status: DISCONTINUED | OUTPATIENT
Start: 2023-10-10 | End: 2023-10-10 | Stop reason: HOSPADM

## 2023-10-09 RX ORDER — PREDNISONE 5 MG/ML
5 SOLUTION ORAL DAILY
Status: DISCONTINUED | OUTPATIENT
Start: 2023-10-10 | End: 2023-10-10 | Stop reason: HOSPADM

## 2023-10-09 RX ORDER — PROPOFOL 10 MG/ML
INJECTION, EMULSION INTRAVENOUS PRN
Status: DISCONTINUED | OUTPATIENT
Start: 2023-10-09 | End: 2023-10-09

## 2023-10-09 RX ORDER — ONDANSETRON 4 MG/1
4 TABLET, ORALLY DISINTEGRATING ORAL EVERY 6 HOURS PRN
Status: DISCONTINUED | OUTPATIENT
Start: 2023-10-09 | End: 2023-10-09

## 2023-10-09 RX ORDER — ASCORBIC ACID, THIAMINE, RIBOFLAVIN, NIACINAMIDE, PYRIDOXINE, FOLIC ACID, COBALAMIN, BIOTIN, PANTOTHENIC ACID 100; 1.5; 1.7; 20; 10; 1; 6; 300; 1 MG/1; MG/1; MG/1; MG/1; MG/1; MG/1; UG/1; UG/1; MG/1
1 TABLET, COATED ORAL EVERY MORNING
COMMUNITY
Start: 2023-02-15 | End: 2024-05-07

## 2023-10-09 RX ORDER — FLUMAZENIL 0.1 MG/ML
0.2 INJECTION, SOLUTION INTRAVENOUS
Status: ACTIVE | OUTPATIENT
Start: 2023-10-09 | End: 2023-10-10

## 2023-10-09 RX ORDER — ONDANSETRON 2 MG/ML
INJECTION INTRAMUSCULAR; INTRAVENOUS PRN
Status: DISCONTINUED | OUTPATIENT
Start: 2023-10-09 | End: 2023-10-09

## 2023-10-09 RX ORDER — PIPERACILLIN SODIUM, TAZOBACTAM SODIUM 3; .375 G/15ML; G/15ML
INJECTION, POWDER, LYOPHILIZED, FOR SOLUTION INTRAVENOUS PRN
Status: DISCONTINUED | OUTPATIENT
Start: 2023-10-09 | End: 2023-10-09

## 2023-10-09 RX ORDER — HYDROMORPHONE HCL IN WATER/PF 6 MG/30 ML
0.2 PATIENT CONTROLLED ANALGESIA SYRINGE INTRAVENOUS
Status: DISCONTINUED | OUTPATIENT
Start: 2023-10-09 | End: 2023-10-10 | Stop reason: HOSPADM

## 2023-10-09 RX ORDER — FENTANYL CITRATE 50 UG/ML
INJECTION, SOLUTION INTRAMUSCULAR; INTRAVENOUS PRN
Status: DISCONTINUED | OUTPATIENT
Start: 2023-10-09 | End: 2023-10-09

## 2023-10-09 RX ORDER — PREDNISONE 5 MG/1
5 TABLET ORAL EVERY MORNING
Status: DISCONTINUED | OUTPATIENT
Start: 2023-10-10 | End: 2023-10-09

## 2023-10-09 RX ORDER — SODIUM CHLORIDE, SODIUM LACTATE, POTASSIUM CHLORIDE, CALCIUM CHLORIDE 600; 310; 30; 20 MG/100ML; MG/100ML; MG/100ML; MG/100ML
INJECTION, SOLUTION INTRAVENOUS CONTINUOUS PRN
Status: DISCONTINUED | OUTPATIENT
Start: 2023-10-09 | End: 2023-10-09

## 2023-10-09 RX ORDER — PIPERACILLIN SODIUM, TAZOBACTAM SODIUM 2; .25 G/10ML; G/10ML
2.25 INJECTION, POWDER, LYOPHILIZED, FOR SOLUTION INTRAVENOUS EVERY 6 HOURS
Status: DISCONTINUED | OUTPATIENT
Start: 2023-10-09 | End: 2023-10-10 | Stop reason: HOSPADM

## 2023-10-09 RX ORDER — ONDANSETRON 2 MG/ML
4 INJECTION INTRAMUSCULAR; INTRAVENOUS EVERY 6 HOURS PRN
Status: DISCONTINUED | OUTPATIENT
Start: 2023-10-09 | End: 2023-10-09

## 2023-10-09 RX ORDER — DICYCLOMINE HYDROCHLORIDE 10 MG/5ML
SOLUTION ORAL PRN
COMMUNITY
Start: 2023-03-10 | End: 2023-11-29

## 2023-10-09 RX ORDER — PREDNISONE 2.5 MG/1
2.5 TABLET ORAL EVERY EVENING
Status: DISCONTINUED | OUTPATIENT
Start: 2023-10-09 | End: 2023-10-09

## 2023-10-09 RX ORDER — NALOXONE HYDROCHLORIDE 0.4 MG/ML
0.4 INJECTION, SOLUTION INTRAMUSCULAR; INTRAVENOUS; SUBCUTANEOUS
Status: DISCONTINUED | OUTPATIENT
Start: 2023-10-09 | End: 2023-10-10 | Stop reason: HOSPADM

## 2023-10-09 RX ORDER — ONDANSETRON 2 MG/ML
4 INJECTION INTRAMUSCULAR; INTRAVENOUS
Status: DISCONTINUED | OUTPATIENT
Start: 2023-10-09 | End: 2023-10-09 | Stop reason: HOSPADM

## 2023-10-09 RX ORDER — METOPROLOL TARTRATE 25 MG/1
25 TABLET, FILM COATED ORAL 2 TIMES DAILY
Status: DISCONTINUED | OUTPATIENT
Start: 2023-10-09 | End: 2023-10-09

## 2023-10-09 RX ORDER — ONDANSETRON 2 MG/ML
4 INJECTION INTRAMUSCULAR; INTRAVENOUS EVERY 6 HOURS PRN
Status: DISCONTINUED | OUTPATIENT
Start: 2023-10-09 | End: 2023-10-10 | Stop reason: HOSPADM

## 2023-10-09 RX ADMIN — SODIUM CHLORIDE, POTASSIUM CHLORIDE, SODIUM LACTATE AND CALCIUM CHLORIDE: 600; 310; 30; 20 INJECTION, SOLUTION INTRAVENOUS at 13:00

## 2023-10-09 RX ADMIN — Medication 1 TABLET: at 22:27

## 2023-10-09 RX ADMIN — LIDOCAINE HYDROCHLORIDE 100 MG: 20 INJECTION, SOLUTION INFILTRATION; PERINEURAL at 13:15

## 2023-10-09 RX ADMIN — ONDANSETRON 4 MG: 2 INJECTION INTRAMUSCULAR; INTRAVENOUS at 14:38

## 2023-10-09 RX ADMIN — FENTANYL CITRATE 50 MCG: 50 INJECTION INTRAMUSCULAR; INTRAVENOUS at 14:47

## 2023-10-09 RX ADMIN — MIDAZOLAM 1 MG: 1 INJECTION INTRAMUSCULAR; INTRAVENOUS at 13:00

## 2023-10-09 RX ADMIN — TACROLIMUS 4 MG: 5 CAPSULE ORAL at 22:27

## 2023-10-09 RX ADMIN — SUGAMMADEX 200 MG: 100 INJECTION, SOLUTION INTRAVENOUS at 14:40

## 2023-10-09 RX ADMIN — PREDNISONE 2.5 MG: 5 SOLUTION ORAL at 22:29

## 2023-10-09 RX ADMIN — PANTOPRAZOLE SODIUM 40 MG: 40 INJECTION, POWDER, FOR SOLUTION INTRAVENOUS at 20:24

## 2023-10-09 RX ADMIN — PROPOFOL 80 MG: 10 INJECTION, EMULSION INTRAVENOUS at 13:15

## 2023-10-09 RX ADMIN — PIPERACILLIN SODIUM AND TAZOBACTAM SODIUM 2.25 G: 2; .25 INJECTION, POWDER, LYOPHILIZED, FOR SOLUTION INTRAVENOUS at 20:24

## 2023-10-09 RX ADMIN — PIPERACILLIN AND TAZOBACTAM 3.38 G: 3; .375 INJECTION, POWDER, FOR SOLUTION INTRAVENOUS at 13:15

## 2023-10-09 RX ADMIN — Medication 50 MG: at 13:15

## 2023-10-09 RX ADMIN — FENTANYL CITRATE 50 MCG: 50 INJECTION INTRAMUSCULAR; INTRAVENOUS at 13:58

## 2023-10-09 RX ADMIN — ORAL VEHICLES - SUSP 25 MG: SUSPENSION at 22:27

## 2023-10-09 ASSESSMENT — ACTIVITIES OF DAILY LIVING (ADL)
ADLS_ACUITY_SCORE: 28
ADLS_ACUITY_SCORE: 30
ADLS_ACUITY_SCORE: 28
ADLS_ACUITY_SCORE: 28

## 2023-10-09 ASSESSMENT — COPD QUESTIONNAIRES: COPD: 1

## 2023-10-09 NOTE — PLAN OF CARE
Shift: [id number]    Goal Outcome Evaluation:    Plan of Care Reviewed With: patient    A&O. 1/2 L NC. Up w/standby. Denies pain. R PIV running TKO. GJ tube intact. NPO for x-ray. Tube feeds start when pump arrives. No BM this shift. Continue w/poc.

## 2023-10-09 NOTE — ANESTHESIA POSTPROCEDURE EVALUATION
Patient: Sofie Rodriguez    Procedure: Procedure(s):  ESOPHAGOGASTRODUODENOSCOPY  MYOTOMY, ESOPHAGUS, ENDOSCOPIC, ORAL APPROACH       Anesthesia Type:  General    Note:  Disposition: Inpatient; Admission   Postop Pain Control: Uneventful            Sign Out: Well controlled pain   PONV: No   Neuro/Psych: Uneventful            Sign Out: Acceptable/Baseline neuro status   Airway/Respiratory: Uneventful            Sign Out: Acceptable/Baseline resp. status   CV/Hemodynamics: Uneventful            Sign Out: Acceptable CV status; No obvious hypovolemia; No obvious fluid overload   Other NRE: NONE   DID A NON-ROUTINE EVENT OCCUR?            Last vitals:  Vitals Value Taken Time   /70 10/09/23 1530   Temp 36.3  C (97.3  F) 10/09/23 1450   Pulse 84 10/09/23 1536   Resp 17 10/09/23 1536   SpO2 88 % 10/09/23 1536   Vitals shown include unvalidated device data.    Electronically Signed By: Matthew Ovalles MD  October 9, 2023  3:37 PM

## 2023-10-09 NOTE — LETTER
[facility] [address] [address]  [phone]      October 10, 2023    Sofie Rodriguez  [address] [place] [address]  [phone] (home)     : 1960      To Whom it may concern:    Sofie Rodriguez was seen in our Emergency Department today, October 10, 2023 for an injury that was reported to be work related.      For the next 1 days she should not work    The employee might be taking medication so that they cannot operate moving machinery or perform activities that require balancing or working above ground.    {Restrictions:853983178}    { RESTRICT:637147}    Sincerely,    Breanna Hampton

## 2023-10-09 NOTE — ANESTHESIA PROCEDURE NOTES
Airway       Patient location during procedure: OR       Procedure Start/Stop Times: 10/9/2023 1:18 PM  Staff -        CRNA: Milton Stone APRN CRNA       Performed By: CRNA  Consent for Airway        Urgency: elective  Indications and Patient Condition       Indications for airway management: vinayak-procedural       Induction type:intravenous       Mask difficulty assessment: 1 - vent by mask    Final Airway Details       Final airway type: endotracheal airway       Successful airway: ETT - single  Endotracheal Airway Details        ETT size (mm): 6.5       Cuffed: yes       Cuff volume (mL): 7       Successful intubation technique: direct laryngoscopy       DL Blade Type: Chand 2       Grade View of Cords: 1       Position: Right       Measured from: lips       Secured at (cm): 21       Bite block used: None    Post intubation assessment        Placement verified by: capnometry and equal breath sounds        Number of attempts at approach: 1       Number of other approaches attempted: 0       Secured with: plastic tape       Ease of procedure: easy       Dentition: Intact and Unchanged    Medication(s) Administered   Medication Administration Time: 10/9/2023 1:18 PM

## 2023-10-09 NOTE — H&P
[facility]    History and Physical - Medicine Service, NILE TEAM 5       Date of Admission:  10/9/2023    Assessment & Plan      Sofie Rodriguez is a 63 year old female admitted on 10/9/2023. 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 Diff, h/o EBV viremia, ESRD on dialysis, hemobilia s/p ERCP presumably due to hemorrhage into gallbladder, gastroparesis s/p PEGJ and diarrhea related to SIBO. She is admitted for close observation following a  G-POEM (esophageal myotomy).    Gastroparesis s/p G-POEM  Tolerated procedure well, pain well-controlled in the PACU. No abdominal pain, no other symptoms or concerns.  -NPO overnight, ok to resume jejunal tube feeds  -UGI series ordered for tomorrow morning to r/o leak  - Continue Zosyn IV while inpatient.   -IV PPI BID, PPI BID on discharge for 1 month  -AM CBC and BMP  -IV Zofran and dilaudid for symptoms overnight  - Liquid diet for three days starting tomorrow. Soft diet on day 4, 5,6. Regular diet starting on POD#7 as tolerated   - Virtual follow-up with Dr. Navarro in 1-month.     Diarrhea 2/2 SIBO  -Consider attempting to prescribe rifaximin at discharge, was previously not covered by insurance    ESRD  -Renal (ESRD) consult, plan to dialyze tomorrow morning  -PTA vitamins, sevelamer    COPD s/p lung transplant  -PTA tacrolimus 4 mg BID  -Prednisone 5 mg qAM, 2.5 mg qPM  -Dapsone 50 mg MWF for PJP ppx  -Per chart review, azathioprine stopped earlier this year and has not been restarted by SOT team    HTN  -Continue PTA metoprolol tartrate 25 mg BID       Diet: Adult Formula Drip Feeding: Continuous Eneida Rijuven Renal Support; Jejunostomy; Goal Rate: 30; mL/hr  NPO for Medical/Clinical Reasons Except for: No Exceptions  DVT Prophylaxis: Pneumatic Compression Devices  Dong Catheter: Not present  Fluids: none  Lines:  PRESENT      CVC Right Subclavian-Site Assessment: WDL  Hemodialysis Vascular Access Arteriovenous fistula Superior Arm-Site Assessment: WDL;Bruit present;Thrill present      Cardiac Monitoring: None  Code Status: Full Code    Clinically Significant Risk Factors Present on Admission                                  Disposition Plan      Expected Discharge Date: 10/10/2023                The patient's care was discussed with the Attending Physician, Dr. Marte .    Haim London MD  Medicine Service, [address]  Securely message with Vocera (more info)  Text page via [employer] Paging/Directory   See signed in provider for up to date coverage information  ______________________________________________________________________    Chief Complaint   Post-procedure    History is obtained from the patient    History of Present Illness   Sofie Rodriguez is a 63 year old female who has a history of end-stage COPD s/p bilateral lung transplant 6/28/22 complicated by acute limb ischemia of LUE s/p left radial thrombectomy, h/o C. Diff, h/o EBV viremia, ESRD on dialysis, hemobilia s/p ERCP presumably due to hemorrhage into gallbladder, gastroparesis s/p PEGJ and diarrhea related to SIBO. She is admitted for close observation following a  G-POEM (esophageal myotomy).    She underwent G-POEM today with Dr. Navarro in an effort to improve her gastroparesis. She tolerated the procedure well and did not have any significant pain afterwards. No other symptoms or concerns post-procedure.    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    Hepatitis 2017    Hep C, [facility]    HTN (hypertension)     Lung infection 11/30/2022    Osteopenia         Past Surgical History   Past Surgical History:   Procedure Laterality Date    BRONCHOSCOPY (RIGID OR FLEXIBLE), DIAGNOSTIC N/A 8/2/2022    Procedure: BRONCHOSCOPY, DIAGNOSTIC- inspection Bronch;  Surgeon: Kamala Lovell MD;  Location: [facility]    BRONCHOSCOPY (RIGID OR FLEXIBLE), DIAGNOSTIC N/A 9/13/2022    Procedure: INSPECTION BRONCHOSCOPY, WITH BRONCHOALVEOLAR LAVAGE;  Surgeon: Jose R Mccullough MD;  [facility]: [facility]    BRONCHOSCOPY (RIGID OR FLEXIBLE), DIAGNOSTIC N/A 11/9/2022    Procedure: BRONCHOSCOPY, WITH BRONCHOALVEOLAR LAVAGE AND BIOPSY;  Surgeon: Cesar Lima MD;  [facility]: [facility]    BRONCHOSCOPY (RIGID OR FLEXIBLE), DIAGNOSTIC N/A 1/25/2023    Procedure: BRONCHOSCOPY, WITH BRONCHOALVEOLAR LAVAGE AND BIOPSY;  Surgeon: Mason Reddy MD;  [facility]: [facility]    BRONCHOSCOPY (RIGID OR FLEXIBLE), DIAGNOSTIC N/A 4/19/2023    Procedure: BRONCHOSCOPY, WITH BRONCHOALVEOLAR LAVAGE AND BIOPSY;  Surgeon: Kamala Lovell MD;  [facility]: [facility]    BRONCHOSCOPY (RIGID OR FLEXIBLE), DIAGNOSTIC N/A 7/12/2023    Procedure: BRONCHOSCOPY, WITH BRONCHOALVEOLAR LAVAGE AND BIOPSY;  Surgeon: Cesar Lima MD;  [facility]: [facility]    BRONCHOSCOPY FLEXIBLE AND RIGID N/A 07/19/2022    Procedure: BRONCHOSCOPY inspection only;  Surgeon: Bob Liao MD;  [facility]: [facility]    COLONOSCOPY  2015    CV CORONARY ANGIOGRAM N/A 06/30/2021    Procedure: CV CORONARY ANGIOGRAM;  Surgeon: Alexander Cuellar MD;  Location:  [facility]    CV RIGHT HEART CATH MEASUREMENTS RECORDED N/A 06/30/2021    Procedure: CV RIGHT HEART CATH;  Surgeon: Alexander Cuellar MD;  Location:  [facility] CATH LAB    ENDOSCOPIC RETROGRADE CHOLANGIOPANCREATOGRAM N/A 8/11/2022    Procedure: ENDOSCOPIC RETROGRADE CHOLANGIOPANCREATOGRAPHY WITH PANCREATIC DUCT NEEDLE KNIFE AND STENT PLACEMENT, BILE DUCT SPHINCTEROTOMY, BLOOD/DEBRIS REMOVAL AND STENT PLACEMENT;  Surgeon: Cosmo Arroyo MD;  [facility]    ENDOSCOPIC RETROGRADE  CHOLANGIOPANCREATOGRAM N/A 10/7/2022    Procedure: ENDOSCOPIC RETROGRADE CHOLANGIOPANCREATOGRAPHY with biliary and pancreatic stent removal, debris removal;  Surgeon: Cosmo Arroyo MD;  [facility]    ENT SURGERY  1974    tonsillectomy    ENTEROSCOPY SMALL BOWEL N/A 8/11/2022    Procedure: SMALL BOWEL ENTEROSCOPY;  Surgeon: Cosmo Arroyo MD;  [facility]    ESOPHAGOGASTRODUODENOSCOPY, WITH NASOGASTRIC TUBE INSERTION N/A 07/01/2022    Procedure: ESOPHAGOGASTRODUODENOSCOPY, WITH NASOJEJUNAL TUBE INSERTION;  Surgeon: Ozzy Nickerson MD;  Location:  GI    ESOPHAGOSCOPY, GASTROSCOPY, DUODENOSCOPY (EGD), COMBINED N/A 8/3/2022    Procedure: ESOPHAGOGASTRODUODENOSCOPY (EGD);  Surgeon: Ira Andres MD;  Location:  GI    ESOPHAGOSCOPY, GASTROSCOPY, DUODENOSCOPY (EGD), COMBINED N/A 1/25/2023    Procedure: ESOPHAGOGASTRODUODENOSCOPY (EGD) with botox injection;  Surgeon: Gonzalez Navarro MD;  Location:  GI    HAND SURGERY      INSERT CHEST TUBE Right 9/13/2022    Procedure: Insert chest tube;  Surgeon: Jose R Mccullough MD;  [facility]    IR CVC TUNNEL PLACEMENT > 5 YRS OF AGE  9/26/2022    IR GASTRO JEJUNOSTOMY TUBE CHANGE  8/31/2022    IR GASTRO JEJUNOSTOMY TUBE CHANGE  12/21/2022    IR GASTRO JEJUNOSTOMY TUBE CHANGE  7/12/2023    IR GASTRO JEJUNOSTOMY TUBE CHANGE  8/18/2023    IR GASTRO JEJUNOSTOMY TUBE PLACEMENT  7/27/2022    IR THORACENTESIS  8/29/2022    LEEP TX, CERVICAL  04/07/2017    HECTOR III    LYMPH NODE BIOPSY Left 2005    Left axilla, benign- [employer]    MIDLINE INSERTION - DOUBLE LUMEN Left 07/28/2022    20cm, Basilic vein    REPLACE GASTROJEJUNOSTOMY TUBE, PERCUTANEOUS  10/7/2022    Procedure: Replace Gastrojejunostomy Tube;  Surgeon: Cosmo Arroyo MD;  [facility]    THORACENTESIS Left 8/29/2022    Procedure: THORACENTESIS;  Surgeon: Oostra, Bo Natanael, PA-C;  Location: [facility] OR    THORACENTESIS Left 9/13/2022    Procedure: Thoracentesis;   Surgeon: Jose R Mccullough MD;  [facility]: [facility]    THROMBECTOMY UPPER EXTREMITY Left 2022    Procedure: LEFT RADIAL ARM THROMBECTOMY;  Surgeon: Christie Graham MD;  [facility]    TRANSPLANT LUNG RECIPIENT SINGLE X2 Bilateral 2022    Procedure: Clamshell Incision, Bilateral Sequential Lung Transplant, On Cardiopulmonary Bypass, Flexible Bronchoscopy;  Surgeon: Sue Sunshine MD;  [facility]       Prior to Admission Medications   Prior to Admission Medications   Prescriptions Last Dose Informant Patient Reported? Taking?   B Complex-C-Folic Acid (DIALYVITE) TABS   Yes Yes   Sig: Take 1 tablet by mouth every morning   Calcium Carbonate-Vitamin D 600-10 MG-MCG TABS 10/8/2023 at 2000  No Yes   Si tablet by Per J Tube route 3 times daily   Sharps Container MISC Unknown  No Yes   Si sharps container   alcohol swab prep pads Unknown  No Yes   Sig: Use to swab area of injection/amos as directed.   azaTHIOprine (IMURAN) 5 mg/mL SUSP Unknown  No Yes   Si mLs (100 mg) by Per J Tube route daily ON HOLD 2023 FOR LOW WBC   blood glucose (CONTOUR NEXT TEST) test strip Unknown  No Yes   Sig: Use to test blood sugar 4 times daily, as directed. Okay to use whatever brand insurance will cover.   blood glucose (NO BRAND SPECIFIED) lancets standard Unknown  No Yes   Sig: Use to test blood sugar 4 times daily as directed. Okay to use whatever brand insurance will cover.   blood glucose monitoring (CONTOUR NEXT MONITOR W/DEVICE KIT) meter device kit Unknown  No Yes   Sig: Use to test blood sugar 4 times daily or as directed.   cyanocobalamin (CYANOCOBALAMIN) 500 MCG tablet 10/8/2023 at 0800  No Yes   Si tablet (500 mcg) by Per Feeding Tube route daily   dapsone 2 mg/mL SUSP 10/9/2023 at 0800  No Yes   Si mLs (50 mg) by Per J Tube route Every Mon, Wed, Fri Morning   dicyclomine (BENTYL) 10 MG/5ML solution   Yes Yes   Sig: Take by mouth as needed   loperamide (IMODIUM A-D) 2 MG  tablet Past Month  Yes Yes   Si tablet (2 mg) by Per J Tube route 4 times daily as needed for diarrhea   metoprolol tartrate (LOPRESSOR) 50 MG tablet 10/9/2023 at 0800  No Yes   Si.5 tablets (25 mg) by Per Feeding Tube route 2 times daily   multivitamin (CENTRUM SILVER) tablet 10/8/2023 at 0800  No Yes   Sig: Take 1 tablet by mouth daily   pantoprazole (PROTONIX) 2 mg/mL SUSP suspension 10/9/2023 at 0800  No Yes   Si mLs (40 mg) by Per J Tube route 2 times daily   predniSONE (DELTASONE) 5 MG tablet 10/9/2023 at 0800  No Yes   Sig: Take 1 tab (5mg) at AM and 1/2 tab (2.5) in pm   protein modular (PROSOURCE TF) LIQD Past Week  No Yes   Si packet by Per Feeding Tube route daily   Patient taking differently: 1 packet by Per Feeding Tube route daily at 2 pm   sevelamer carbonate, RENVELA, 0.8 GM PACK Packet 10/7/2023 at 1600  No No   Sig: Take 1 packet (0.8 g) by mouth 3 times daily (with meals)   tacrolimus (GENERIC EQUIVALENT) 1 mg/mL suspension 10/9/2023 at 0800  No Yes   Sig: Take 4 mLs (4 mg) by mouth 2 times daily      Facility-Administered Medications: None           Physical Exam   Vital Signs: Temp: 98.4  F (36.9  C) Temp src: Oral BP: (!) 141/70 Pulse: 87   Resp: 18 SpO2: 93 % O2 Device: None (Room air)    Weight: 112 lbs 10.48 oz    General Appearance: Well-appearing, NAD, alert  Respiratory: breathing comfortably on LFNC, CTAB  Cardiovascular: RRR, no murmurs  GI: Abdomen soft, nontender, nondistended  Skin: Scattered bruises     Medical Decision Making       Please see A&P for additional details of medical decision making.      Data

## 2023-10-09 NOTE — OP NOTE
Upper GI Endoscopy 10/09/2023 11:50 AM [facility]   [address] [place] [address] [phone]     Endoscopy Department  _______________________________________________________________________________  Patient Name: Sofie Rodriguez            Procedure Date: 10/9/2023 11:50 AM  MRN: [id number]                       Account Number: [id number]  YOB: 1960               Admit Type: Outpatient  Age: 63                               Room:  OR   Gender: Female                        Note Status: Finalized  Attending MD: OSKAR PÉREZ MD,      Pause for the Cause: time out performed  Total Sedation Time:                    _______________________________________________________________________________     Procedure:             Upper GI endoscopy  Indications:           For therapy of gastroparesis; 63 year old female with                         a PMHx of end stage COPD s/p bilateral lung transplant                         on 6/28/22 complicated by acute limb ischemia of LUE                          s/p left radial thrombectomy, h/o C. Diff, h/o EBV                         viremia, ESRD on dialysis, hemobilia s/p ERCP                         presumably due to hemorrhage into gallbladder,                         gastroparesis s/p PEGJ and diarrhea related to SIBO.                         Responded previously to pyloric botox. Patient here                         for G-POEM. GCSI= 3,0,0,3,2,2,3,4,4=21  Providers:             OSKAR PÉREZ MD  Referring MD:            Requesting Provider:   MICHELE SOSA MD  Medicines:             General Anesthesia, IV Zosyn 3.375g  Complications:         No immediate complications. Estimated blood loss:                         Minimal.  _______________________________________________________________________________  Procedure:             Pre-Anesthesia Assessment:                         - Prior to the procedure, a  History and Physical was                         performed, and patient medications and allergies were                         reviewed. The patient is competent. The risks and                         benefits of the procedure and the sedation options and                         risks were discussed with the patient. All questions                         were answered and informed consent was obtained.                         Patient identification and proposed procedure were                         verified by the physician, the nurse, the                         anesthesiologist and the anesthetist in the procedure                         room. Mental Status Examination: alert and oriented.                         Airway Examination: normal oropharyngeal airway and                         neck mobility. Respiratory Examination: clear to                         auscultation. CV Examination: normal. Prophylactic                         Antibiotics: The patient requires prophylactic                         antibiotics peroral endoscopic myotomy. Prior                         Anticoagulants: The patient has taken no anticoagulant                         or antiplatelet agents. ASA Grade Assessment: III - A                         patient with severe systemic disease. After reviewing                         the risks and benefits, the patient was deemed in                         satisfactory condition to undergo the procedure. The                         anesthesia plan was to use general anesthesia.                         Immediately prior to administration of medications,                         the patient was re-assessed for adequacy to receive                         sedatives. The heart rate, respiratory rate, oxygen                         saturations, blood pressure, adequacy of pulmonary                         ventilation, and response to care were monitored                         throughout the procedure.  The physical status of the                         patient was re-assessed after the procedure.                         After obtaining informed consent, the endoscope was                         passed under direct vision. Throughout the procedure,                         the patient's blood pressure, pulse, and oxygen                         saturations were monitored continuously. The Endoscope                         was introduced through the mouth, and advanced to the                         second part of duodenum. The was introduced through                         the and advanced to the. After obtaining informed                         consent, the endoscope was passed under direct vision.                         Throughout the procedure, the patient's blood                         pressure, pulse, and oxygen saturations were monitored                         continuously.The endoscopic myotomy was accomplished                         without difficulty. The patient tolerated the                         procedure well.                                                                                   Findings:       The esophagus was normal.       There was evidence of an intact gastrostomy with a patent GJ-tube       present in the gastric body extending into the duodneum. This was       characterized by healthy appearing mucosa. Small amount of retained food       and fluid in the stomach.       The examined duodenum was normal.       Localized moderate mucosal changes characterized by nodularity were       found at the pylorus at the 11 o'clock position. Likely hyperplastic       from GJ tube.       Preparations were made for gastric peroral endoscopic myotomy (G-POEM).       The stomach was cleaned and irrigated using saline and suctioned.       Mucosotomy followed by myotomy were performed in a greater curvature       orientation. First, a submucosal injection of a solution of methylene       blue and  saline was used to lift the mucosa at the site of the initial       mucosotomy. The initial mucosal incision was made transversely at       approximately 4.0 cm proximal to the pylorus using a HybridKnife I-Type.       Next, the endoscope with a clear cap was used to enter into the       submucosal tunnel. The submucosal tunnel was then further created by       dissection of the submucosal fibers distally to the pyloric ring. A full       thickness myotomy (in a greater curvature orientation) was then       performed beginning at the pylorus using an ITknife2. The myotomy was       extended to 3.0 cm proximal to the pylorus. Intra procedure bleeding was       mild. A small Coagrasper was used effectively for hemostasis. After       completion of the myotomy, examination of the stomach and duodenum       showed no inadvertent mucosotomy. There was no evidence of bleeding       noted on the inspection of the myotomy edges and submucosal tunnel. The       myotomy was successfully performed. The mucosal entrance to the       submucosal tunnel was closed using endoscopic suturing. After G-POEM and       endoscope removal, the patient was examined. The patient's abdomen was       nondistended. Pylorus lumen open without resistance to passage.                                                                                   Impression:            - Normal esophagus.                         - GJ tube in place and not manipulated                         - Normal examined duodenum.                         - Nodularity was found at the pylorus at the 11                         o'clock position. Likely hyperplastic from GJ tube.                         - Gastric peroral endoscopic myotomy (G-POEM) was                         performed along greater curvature as described above.                         Mucosotomy closed with endoscopic suturing.                         - No specimens collected.  Recommendation:        - Monitor  in PACU and admit to medicine for                         observation.                         - NPO overnight. Leave G-tube to gravity and can start                         J-tube feeds tonight as tolerated                         - Started pantoprazole 40 mg IV BID.                         - Upon discharge, prescribe pantoprazole 40 mg PO BID                         x1-month.                         - Continue Zosyn IV while inpatient.                         - Patient had a positive breath test for SIBO but insurance denied Rifaximin 550 mg TID x 2 weeks. Consider attempting re-prescribing on discharge as did not respond to course of cipro. Does not need additional antibiotics related to procedure.                         - No anticoagulation or antiplatelets for 3-days.                         - Ordered head of bed elevation to 30-45 degrees.                         - Ordered incentive spirometry.                         - Order analgesia IV & antiemetics IV PRN.                         - Ordered CBC & BMP tomorrow AM                         - Ordered Upper GI series tomorrow AM to rule-out                         post-POEM gastric leak. Pneumoperitoneum expected.                         - If no gastric leak, and otherwise doing clinically                         well discharge home tomorrow and start full liquid diet                         - Liquid diet for three days starting tomorrow. Soft                         diet on day 4, 5,6. Regular diet starting on POD#7 as                         tolerated  - Patient due for dialysis tomorrow                         - Virtual follow-up with Dr. Navarro in 1-month.                                                                                     Gonzalez Navarro MD

## 2023-10-09 NOTE — ANESTHESIA CARE TRANSFER NOTE
Patient: Sofie Rodriguez    Procedure: Procedure(s):  ESOPHAGOGASTRODUODENOSCOPY  MYOTOMY, ESOPHAGUS, ENDOSCOPIC, ORAL APPROACH       Diagnosis: Gastroparesis [K31.84]  Diagnosis Additional Information: No value filed.    Anesthesia Type:   General     Note:    Oropharynx: oropharynx clear of all foreign objects and spontaneously breathing  Level of Consciousness: awake  Oxygen Supplementation: nasal cannula  Level of Supplemental Oxygen (L/min / FiO2): 2  Independent Airway: airway patency satisfactory and stable  Dentition: dentition unchanged  Vital Signs Stable: post-procedure vital signs reviewed and stable  Report to RN Given: handoff report given  Patient transferred to: PACU    Handoff Report: Identifed the Patient, Identified the Reponsible Provider, Reviewed the pertinent medical history, Discussed the surgical course, Reviewed Intra-OP anesthesia mangement and issues during anesthesia, Set expectations for post-procedure period and Allowed opportunity for questions and acknowledgement of understanding      Vitals:  Vitals Value Taken Time   /74 10/09/23 1450   Temp     Pulse 84 10/09/23 1455   Resp     SpO2 96 % 10/09/23 1455   Vitals shown include unvalidated device data.    Electronically Signed By: RUFUS Baird CRNA  October 9, 2023  2:56 PM

## 2023-10-09 NOTE — PROGRESS NOTES
Admitted/transferred from: PACU  2 RN full  skin assessment completed by Bridgette Ontiveros, RN and Branden DUDLEY RN.  Skin assessment finding: scattered bruising, G/J tube both clamped, R CVC for dialysis, blanchable redness to sacrum   Interventions/actions: educated on importance of shifting weight      Bedside Emergency Equipment Present:  Suction Regulator: Yes  Suction Canister: Yes  Tubing between Regulator and Canister: Yes  O2 Regulator with Tree: Yes  Ambu Bag: Yes

## 2023-10-09 NOTE — OR NURSING
Patient dialyzes Tue-Th- Sat, and dialyzed Saturday, 10/7/23. She took her meds via her J tube this a.m.

## 2023-10-09 NOTE — ANESTHESIA PREPROCEDURE EVALUATION
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    Hepatitis 2017    Hep C, [facility]    HTN (hypertension)     Lung infection 2022    Osteopenia       Past Surgical History:   Procedure Laterality Date    BRONCHOSCOPY (RIGID OR FLEXIBLE), DIAGNOSTIC N/A 2022    Procedure: BRONCHOSCOPY, DIAGNOSTIC- inspection Bronch;  Surgeon: Kamala Lovell MD;  Location: [facility]    BRONCHOSCOPY (RIGID OR FLEXIBLE), DIAGNOSTIC N/A 2022    Procedure: INSPECTION BRONCHOSCOPY, WITH BRONCHOALVEOLAR LAVAGE;  Surgeon: Jose R Mccullough MD;  Location: [facility]    BRONCHOSCOPY (RIGID OR FLEXIBLE), DIAGNOSTIC N/A 2022    Procedure: BRONCHOSCOPY, WITH BRONCHOALVEOLAR LAVAGE AND BIOPSY;  Surgeon: Cesar Lima MD;  Location: [facility]    BRONCHOSCOPY (RIGID OR FLEXIBLE), DIAGNOSTIC N/A 2023    Procedure: BRONCHOSCOPY, WITH BRONCHOALVEOLAR LAVAGE AND BIOPSY;  Surgeon: Mason Reddy MD;  Location: [facility]    BRONCHOSCOPY (RIGID OR FLEXIBLE), DIAGNOSTIC N/A 2023    Procedure: BRONCHOSCOPY, WITH BRONCHOALVEOLAR LAVAGE AND BIOPSY;  Surgeon: Kamala Lovell MD;  Location: [facility]    BRONCHOSCOPY (RIGID OR FLEXIBLE), DIAGNOSTIC N/A 2023    Procedure: BRONCHOSCOPY, WITH BRONCHOALVEOLAR LAVAGE AND BIOPSY;  Surgeon: Cesar Lima MD;  Location: [facility]    BRONCHOSCOPY FLEXIBLE AND RIGID N/A 2022    Procedure: BRONCHOSCOPY inspection only;  Surgeon: Bob Liao MD;  Location: [facility]    COLONOSCOPY      CV CORONARY ANGIOGRAM N/A  06/30/2021    Procedure: CV CORONARY ANGIOGRAM;  Surgeon: Alexander Cuellar MD;  Location:  [facility]    CV RIGHT HEART CATH MEASUREMENTS RECORDED N/A 06/30/2021    Procedure: CV RIGHT HEART CATH;  Surgeon: Alexander Cuellar MD;  Location:  [facility]    ENDOSCOPIC RETROGRADE CHOLANGIOPANCREATOGRAM N/A 8/11/2022    Procedure: ENDOSCOPIC RETROGRADE CHOLANGIOPANCREATOGRAPHY WITH PANCREATIC DUCT NEEDLE KNIFE AND STENT PLACEMENT, BILE DUCT SPHINCTEROTOMY, BLOOD/DEBRIS REMOVAL AND STENT PLACEMENT;  Surgeon: Cosmo Arroyo MD;  [facility]    ENDOSCOPIC RETROGRADE CHOLANGIOPANCREATOGRAM N/A 10/7/2022    Procedure: ENDOSCOPIC RETROGRADE CHOLANGIOPANCREATOGRAPHY with biliary and pancreatic stent removal, debris removal;  Surgeon: Cosmo Arroyo MD;  Location:  OR    ENT SURGERY  1974    tonsillectomy    ENTEROSCOPY SMALL BOWEL N/A 8/11/2022    Procedure: SMALL BOWEL ENTEROSCOPY;  Surgeon: Cosmo Arroyo MD;  Location:  OR    ESOPHAGOGASTRODUODENOSCOPY, WITH NASOGASTRIC TUBE INSERTION N/A 07/01/2022    Procedure: ESOPHAGOGASTRODUODENOSCOPY, WITH NASOJEJUNAL TUBE INSERTION;  Surgeon: Ozzy Nickerson MD;  Location:  GI    ESOPHAGOSCOPY, GASTROSCOPY, DUODENOSCOPY (EGD), COMBINED N/A 8/3/2022    Procedure: ESOPHAGOGASTRODUODENOSCOPY (EGD);  Surgeon: Ira Andres MD;  Location:  GI    ESOPHAGOSCOPY, GASTROSCOPY, DUODENOSCOPY (EGD), COMBINED N/A 1/25/2023    Procedure: ESOPHAGOGASTRODUODENOSCOPY (EGD) with botox injection;  Surgeon: Gonzalez Navarro MD;  Location:  GI    HAND SURGERY      INSERT CHEST TUBE Right 9/13/2022    Procedure: Insert chest tube;  Surgeon: Jose R Mccullough MD;  Location:  GI    IR CVC TUNNEL PLACEMENT > 5 YRS OF AGE  9/26/2022    IR GASTRO JEJUNOSTOMY TUBE CHANGE  8/31/2022    IR GASTRO JEJUNOSTOMY TUBE CHANGE  12/21/2022    IR GASTRO JEJUNOSTOMY TUBE CHANGE  7/12/2023    IR GASTRO JEJUNOSTOMY TUBE CHANGE  8/18/2023     IR GASTRO JEJUNOSTOMY TUBE PLACEMENT  2022    IR THORACENTESIS  2022    LEEP TX, CERVICAL  2017    HECTOR III    LYMPH NODE BIOPSY Left     Left axilla, benign- [place]    MIDLINE INSERTION - DOUBLE LUMEN Left 2022    20cm, Basilic vein    REPLACE GASTROJEJUNOSTOMY TUBE, PERCUTANEOUS  10/7/2022    Procedure: Replace Gastrojejunostomy Tube;  Surgeon: Cosmo Arroyo MD;  [facility]    THORACENTESIS Left 2022    Procedure: THORACENTESIS;  Surgeon: Bo Capone PA-C;  Location: [facility]    THORACENTESIS Left 2022    Procedure: Thoracentesis;  Surgeon: Jose R Mccullough MD;  Location: [facility]    THROMBECTOMY UPPER EXTREMITY Left 2022    Procedure: LEFT RADIAL ARM THROMBECTOMY;  Surgeon: Christie Graham MD;  [facility]    TRANSPLANT LUNG RECIPIENT SINGLE X2 Bilateral 2022    Procedure: Clamshell Incision, Bilateral Sequential Lung Transplant, On Cardiopulmonary Bypass, Flexible Bronchoscopy;  Surgeon: Sue Sunshine MD;  Location: [facility]      Allergies   Allergen Reactions    Blood Transfusion Related (Informational Only) Other (See Comments)     Patient has a history of a clinically significant antibody against RBC antigens.  A delay in compatible RBCs may occur.       Social History     Tobacco Use    Smoking status: Former     Years: 30.00     Types: Cigarettes     Quit date: 2020     Years since quittin.9    Smokeless tobacco: Never   Substance Use Topics    Alcohol use: Not Currently      Wt Readings from Last 1 Encounters:   23 49.4 kg (109 lb)        Anesthesia Evaluation   Pt has had prior anesthetic. Type: General and MAC.        ROS/MED HX  ENT/Pulmonary:     (+)                         COPD,              Neurologic:       Cardiovascular:     (+)  hypertension- -   -  - -      CHF                                METS/Exercise Tolerance:     Hematologic:       Musculoskeletal:       GI/Hepatic:     (+)            hepatitis  liver disease,       Renal/Genitourinary:     (+) renal disease,             Endo:     (+) type I DM,                     Psychiatric/Substance Use:       Infectious Disease:       Malignancy:       Other:            Physical Exam    Airway        Mallampati: II   TM distance: > 3 FB   Neck ROM: full   Mouth opening: > 3 cm    Respiratory Devices and Support         Dental           Cardiovascular   cardiovascular exam normal          Pulmonary   pulmonary exam normal                OUTSIDE LABS:  CBC:   Lab Results   Component Value Date    WBC 5.2 07/25/2023    WBC 4.2 07/11/2023    HGB 11.0 (L) 07/25/2023    HGB 12.8 07/11/2023    HCT 35.9 07/25/2023    HCT 40.4 07/11/2023     07/25/2023     07/11/2023     BMP:   Lab Results   Component Value Date     07/25/2023     07/11/2023    POTASSIUM 4.2 07/25/2023    POTASSIUM 4.4 07/11/2023    CHLORIDE 105 09/06/2023    CHLORIDE 104 08/28/2023    CO2 27 07/25/2023    CO2 29 07/11/2023    BUN 12.8 07/25/2023    BUN 10.2 07/11/2023    CR 3.20 (H) 07/25/2023    CR 3.40 (H) 07/11/2023     (H) 07/25/2023     (H) 07/11/2023     COAGS:   Lab Results   Component Value Date    PTT 25 08/12/2022    INR 0.92 07/11/2023    FIBR 192 06/29/2022     POC: No results found for: BGM, HCG, HCGS  HEPATIC:   Lab Results   Component Value Date    ALBUMIN 4.2 07/11/2023    PROTTOTAL 7.1 07/11/2023    ALT 7 07/11/2023    AST 23 07/11/2023    ALKPHOS 65 07/11/2023    BILITOTAL 0.6 07/11/2023    LAYLA 24 09/19/2022     OTHER:   Lab Results   Component Value Date    PH 7.37 05/17/2023    LACT 1.0 05/20/2023    A1C <4.2 07/11/2023    ESTUARDO 9.0 07/25/2023    PHOS 2.1 (L) 07/11/2023    MAG 2.0 07/25/2023    LIPASE 20 05/17/2023    AMYLASE 42 10/07/2022    TSH 1.49 07/18/2022    SED 24 05/17/2023       Anesthesia Plan    ASA Status:  3       Anesthesia Type: General.     - Airway: ETT   Induction: Intravenous.   Maintenance: Inhalation.   Techniques and  Equipment:     - Lines/Monitors: 2nd IV     Consents    Anesthesia Plan(s) and associated risks, benefits, and realistic alternatives discussed. Questions answered and patient/representative(s) expressed understanding.     - Discussed: Risks, Benefits and Alternatives for the PROCEDURE were discussed     - Discussed with:  Patient      - Extended Intubation/Ventilatory Support Discussed: No.      - Patient is DNR/DNI Status: No     Use of blood products discussed: No .     Postoperative Care    Pain management: IV analgesics.   PONV prophylaxis: Ondansetron (or other 5HT-3), Dexamethasone or Solumedrol     Comments:                Michaela Ferguson MD

## 2023-10-10 ENCOUNTER — APPOINTMENT (OUTPATIENT)
Dept: GENERAL RADIOLOGY | Facility: CLINIC | Age: 63
End: 2023-10-10
Attending: INTERNAL MEDICINE
Payer: MEDICARE

## 2023-10-10 VITALS
WEIGHT: 112.66 LBS | HEART RATE: 76 BPM | SYSTOLIC BLOOD PRESSURE: 133 MMHG | OXYGEN SATURATION: 93 % | DIASTOLIC BLOOD PRESSURE: 69 MMHG | BODY MASS INDEX: 20.73 KG/M2 | HEIGHT: 62 IN | TEMPERATURE: 97.5 F | RESPIRATION RATE: 16 BRPM

## 2023-10-10 LAB
ANION GAP SERPL CALCULATED.3IONS-SCNC: 13 MMOL/L (ref 7–15)
BUN SERPL-MCNC: 34.2 MG/DL (ref 8–23)
CALCIUM SERPL-MCNC: 8.9 MG/DL (ref 8.8–10.2)
CHLORIDE SERPL-SCNC: 101 MMOL/L (ref 98–107)
CREAT SERPL-MCNC: 5.7 MG/DL (ref 0.51–0.95)
DEPRECATED HCO3 PLAS-SCNC: 26 MMOL/L (ref 22–29)
EGFRCR SERPLBLD CKD-EPI 2021: 8 ML/MIN/1.73M2
ERYTHROCYTE [DISTWIDTH] IN BLOOD BY AUTOMATED COUNT: 14.7 % (ref 10–15)
GLUCOSE BLDC GLUCOMTR-MCNC: 122 MG/DL (ref 70–99)
GLUCOSE SERPL-MCNC: 132 MG/DL (ref 70–99)
HBV SURFACE AG SERPL QL IA: NONREACTIVE
HCT VFR BLD AUTO: 38.9 % (ref 35–47)
HGB BLD-MCNC: 12 G/DL (ref 11.7–15.7)
HOLD SPECIMEN: NORMAL
LACTATE SERPL-SCNC: 0.6 MMOL/L (ref 0.7–2)
MCH RBC QN AUTO: 36.1 PG (ref 26.5–33)
MCHC RBC AUTO-ENTMCNC: 30.8 G/DL (ref 31.5–36.5)
MCV RBC AUTO: 117 FL (ref 78–100)
PLATELET # BLD AUTO: 125 10E3/UL (ref 150–450)
POTASSIUM SERPL-SCNC: 4.1 MMOL/L (ref 3.4–5.3)
RBC # BLD AUTO: 3.32 10E6/UL (ref 3.8–5.2)
SODIUM SERPL-SCNC: 140 MMOL/L (ref 135–145)
WBC # BLD AUTO: 4.5 10E3/UL (ref 4–11)

## 2023-10-10 PROCEDURE — 83605 ASSAY OF LACTIC ACID: CPT | Performed by: INTERNAL MEDICINE

## 2023-10-10 PROCEDURE — 250N000009 HC RX 250: Performed by: INTERNAL MEDICINE

## 2023-10-10 PROCEDURE — 99221 1ST HOSP IP/OBS SF/LOW 40: CPT | Mod: GC | Performed by: INTERNAL MEDICINE

## 2023-10-10 PROCEDURE — C9113 INJ PANTOPRAZOLE SODIUM, VIA: HCPCS | Mod: JZ | Performed by: STUDENT IN AN ORGANIZED HEALTH CARE EDUCATION/TRAINING PROGRAM

## 2023-10-10 PROCEDURE — 74240 X-RAY XM UPR GI TRC 1CNTRST: CPT | Mod: 26 | Performed by: RADIOLOGY

## 2023-10-10 PROCEDURE — 85041 AUTOMATED RBC COUNT: CPT | Performed by: INTERNAL MEDICINE

## 2023-10-10 PROCEDURE — 74240 X-RAY XM UPR GI TRC 1CNTRST: CPT

## 2023-10-10 PROCEDURE — 250N000013 HC RX MED GY IP 250 OP 250 PS 637: Performed by: INTERNAL MEDICINE

## 2023-10-10 PROCEDURE — 90935 HEMODIALYSIS ONE EVALUATION: CPT

## 2023-10-10 PROCEDURE — G0378 HOSPITAL OBSERVATION PER HR: HCPCS

## 2023-10-10 PROCEDURE — 250N000011 HC RX IP 250 OP 636: Mod: JZ | Performed by: STUDENT IN AN ORGANIZED HEALTH CARE EDUCATION/TRAINING PROGRAM

## 2023-10-10 PROCEDURE — 258N000003 HC RX IP 258 OP 636: Performed by: INTERNAL MEDICINE

## 2023-10-10 PROCEDURE — G0257 UNSCHED DIALYSIS ESRD PT HOS: HCPCS

## 2023-10-10 PROCEDURE — 80048 BASIC METABOLIC PNL TOTAL CA: CPT | Performed by: INTERNAL MEDICINE

## 2023-10-10 PROCEDURE — 99239 HOSP IP/OBS DSCHRG MGMT >30: CPT | Performed by: STUDENT IN AN ORGANIZED HEALTH CARE EDUCATION/TRAINING PROGRAM

## 2023-10-10 PROCEDURE — 86706 HEP B SURFACE ANTIBODY: CPT | Performed by: INTERNAL MEDICINE

## 2023-10-10 PROCEDURE — 250N000012 HC RX MED GY IP 250 OP 636 PS 637: Performed by: STUDENT IN AN ORGANIZED HEALTH CARE EDUCATION/TRAINING PROGRAM

## 2023-10-10 PROCEDURE — 250N000013 HC RX MED GY IP 250 OP 250 PS 637: Performed by: STUDENT IN AN ORGANIZED HEALTH CARE EDUCATION/TRAINING PROGRAM

## 2023-10-10 PROCEDURE — 96376 TX/PRO/DX INJ SAME DRUG ADON: CPT

## 2023-10-10 PROCEDURE — 250N000012 HC RX MED GY IP 250 OP 636 PS 637: Performed by: INTERNAL MEDICINE

## 2023-10-10 PROCEDURE — 36415 COLL VENOUS BLD VENIPUNCTURE: CPT | Performed by: INTERNAL MEDICINE

## 2023-10-10 PROCEDURE — 87340 HEPATITIS B SURFACE AG IA: CPT | Performed by: INTERNAL MEDICINE

## 2023-10-10 PROCEDURE — 99223 1ST HOSP IP/OBS HIGH 75: CPT | Performed by: DIETITIAN, REGISTERED

## 2023-10-10 PROCEDURE — 82962 GLUCOSE BLOOD TEST: CPT

## 2023-10-10 RX ORDER — LOPERAMIDE HCL 1 MG/7.5ML
2 SUSPENSION ORAL 4 TIMES DAILY PRN
Status: DISCONTINUED | OUTPATIENT
Start: 2023-10-10 | End: 2023-10-10 | Stop reason: HOSPADM

## 2023-10-10 RX ADMIN — Medication 1 TABLET: at 13:28

## 2023-10-10 RX ADMIN — PIPERACILLIN SODIUM AND TAZOBACTAM SODIUM 2.25 G: 2; .25 INJECTION, POWDER, LYOPHILIZED, FOR SOLUTION INTRAVENOUS at 06:44

## 2023-10-10 RX ADMIN — Medication: at 09:54

## 2023-10-10 RX ADMIN — LOPERAMIDE HCL 2 MG: 1 SOLUTION ORAL at 16:03

## 2023-10-10 RX ADMIN — Medication 1 TABLET: at 08:24

## 2023-10-10 RX ADMIN — TACROLIMUS 4 MG: 5 CAPSULE ORAL at 08:24

## 2023-10-10 RX ADMIN — PANTOPRAZOLE SODIUM 40 MG: 40 INJECTION, POWDER, FOR SOLUTION INTRAVENOUS at 08:24

## 2023-10-10 RX ADMIN — PIPERACILLIN SODIUM AND TAZOBACTAM SODIUM 2.25 G: 2; .25 INJECTION, POWDER, LYOPHILIZED, FOR SOLUTION INTRAVENOUS at 01:26

## 2023-10-10 RX ADMIN — CYANOCOBALAMIN TAB 500 MCG 500 MCG: 500 TAB at 08:24

## 2023-10-10 RX ADMIN — ORAL VEHICLES - SUSP 25 MG: SUSPENSION at 08:24

## 2023-10-10 RX ADMIN — SODIUM CHLORIDE 300 ML: 9 INJECTION, SOLUTION INTRAVENOUS at 09:54

## 2023-10-10 RX ADMIN — SODIUM CHLORIDE 250 ML: 9 INJECTION, SOLUTION INTRAVENOUS at 09:54

## 2023-10-10 RX ADMIN — Medication 1 MG: at 08:24

## 2023-10-10 RX ADMIN — PIPERACILLIN SODIUM AND TAZOBACTAM SODIUM 2.25 G: 2; .25 INJECTION, POWDER, LYOPHILIZED, FOR SOLUTION INTRAVENOUS at 13:28

## 2023-10-10 RX ADMIN — Medication 1 TABLET: at 09:02

## 2023-10-10 RX ADMIN — PREDNISONE 5 MG: 5 SOLUTION ORAL at 08:25

## 2023-10-10 RX ADMIN — SEVELAMER CARBONATE 0.8 G: 800 POWDER, FOR SUSPENSION ORAL at 13:28

## 2023-10-10 RX ADMIN — SEVELAMER CARBONATE 0.8 G: 800 POWDER, FOR SUSPENSION ORAL at 08:26

## 2023-10-10 ASSESSMENT — ACTIVITIES OF DAILY LIVING (ADL)
ADLS_ACUITY_SCORE: 28

## 2023-10-10 NOTE — TELEPHONE ENCOUNTER
Medication Appeal Initiation    Medication: XIFAXAN 550 MG PO TABS  Appeal Start Date:  10/10/2023  Insurance Company: [employer]  Insurance Phone: [phone]  Insurance Fax: [phone]  Comments:  Appeal requested on behalf of inpatient team. Submitted via fax 10/10.    Claribel Sauer  [facility] Liaison  Ph: [phone]  Fax: [phone]  Available on Vocera (learn more here)

## 2023-10-10 NOTE — DISCHARGE SUMMARY
Dialysis Discharge Summary Brief    [facility]  Division of Nephrology  Nephrology Discharge Dialysis Orders  Ph: [phone]  Fax: [phone]    Sofie Rodriguez  MRN: [id number]  YOB: 1960    [facility]: [facility]  Primary Nephrologist: Tiki     Date of Admission: 10/9/2023  Date of Discharge:   Discharge Diagnosis:    ICD-10-CM    1. Small intestinal bacterial overgrowth (SIBO)  K63.89 rifaximin (XIFAXAN) 550 MG TABS tablet      2. S/P lung transplant (H)  Z94.2 azaTHIOprine (IMURAN) 5 mg/mL SUSP          [] New initiation, new dialysis orders will be faxed.    [x] Resume all previous dialysis orders with exception as noted below    New Orders (if not applicable put NA):  Estimated Dry Weight 50   Dialysis Duration 3   Dialysis Access CVC   Antibiotics (dose per dialysis, end date) NA           Labs to be drawn at dialysis NA   Other major changes to dialysis prescription (e.g. Dialysate bath, heparin, blood flow rate, etc)   There is no change in her prescription. She has received a single HD session via CVC.    Medication changes (also fax the unit a copy of the discharge summary)         NONE     Name of physician completing this form: Sheela Saravia MD, MD

## 2023-10-10 NOTE — CONSULTS
Gastroenterology Consultation      Date of Admission:  10/9/2023  Reason for Admission: Gastroparesis  Date of Consult  10/10/2023   Requesting Physician:  Breanna Hampton           ASSESSMENT AND RECOMMENDATIONS:   Assessment:  Sofie is a 63 year old female with a PMHx of end stage COPD s/p bilateral lung transplant on 6/28/22 complicated by acute limb ischemia of LUE s/p left radial thrombectomy, h/o C. Diff, h/o EBV viremia, ESRD on dialysis, hemobilia s/p ERCP presumably due to hemorrhage into gallbladder, chronic diarrhea, gastroparesis s/p PEGJ placement and now admitted 10/9/23 for observation s/p Gastric peroral endoscopic myotomy (G-POEM) with Dr. Navarro.    #Gastroparesis  #S/p G-POEM (10/9/2023)  Patient with hx of gastroparesis following her lung transplant in 6/2022.  Gastric emptying study 1/2/2023 demonstrating persistent delayed emptying of 75% at 240 minutes.  GJ placement with IR for ongoing nutrition support and po for comfort but c/o post-prandial fullness, nausea and bloating requiring Gtube venting.  S/p Botox 1/25/23 and initially had improvement in sx but this has worn off and she is symptomatic with little PO intake. Decision made to pursue G-POEM which was completed 10/9/23.  Postop without c/o worsening abd pain, appropriately tender to palpation and c/o hunger and desire to eat.  Pending completion of upper GI to r/o leak.    #S/p PEG-J (with IR 7/27/23) - hx of J-tube feeding intolerance  #Small intestinal bacterial overgrowth (SIBO)  #Chronic Osmotic Diarrhea  #Moderate protein-energy malnutrition   Had inpatient diarrhea work up (negative colonoscopy and infectious work up) that was c/w osmotic diarrhea likely r/t malabsorptive process. Given associated gas/bloating symptoms, small bowel dysmotility and J-tube feeding intolerance, high suspicion for SIBO which was confirmed on positive hydrogen breath test 9/1/23. Previously prescribed Rifaximin 550 mg TID x 2 weeks but insurance  denied.  Alternatively prescribed and completed Cipro 500 mg BID x 14 days but failed this treatment as patient continues to c/o gas/bloating, diarrhea and inability to tolerate TF to goal rate    Hx of Jtube TFs with [employer] formula with rec goal rate of 40 but difficulty advancing >20-30 ml/hr.  Followed by OP transplant RD who has been working with pt to optimize both po/TF and weight has been stable since PEG-J placed (confirmed by weight records: 111# 7/25/23, 112# on adm 10/9/23).  Mild sx of clavicular, temporal and UE/LE muscle wasting.    Recommendations:  - Proceed with Upper GI series to r/o post-POEM gastric leak.  Pneumoperitoneum expected.   - NPO until complete upper GI.  If no gastric leak, start full liquid diet and continue with the following diet progression:  - Liquid diet (CL/FL) x3 days (10/10-10/12).  If tolerates then,  - Advance to Soft diet on day 4, 5,6 (10/13-10/15). If tolerates, then,  - Advance to regular diet on POD#7 (starting 10/16).   - Leave G-tube to gravity and continue J-tube feeds as tolerated.   - Continue pantoprazole 40 mg IV BID while inpatient.  Transition to PO pantoprazole 40 mg BID x1 month at discharge (or continue pending pulm transplant input).  - Continue Zosyn IV while inpatient. Can discontinue at discharge.  - Rifaximin 550 mg TID x 2 weeks for SIBO and failure of cipro trial.  Does not need additional antibiotics related to procedure.   - No anticoagulation or antiplatelets for 3-days.   - HOB elevation to 30-45 degrees.   - Continue incentive spirometry.   - Analgesia & antiemetics PRN per primary service.  - Appropriate for discharge once tolerating liquid diet and pain well controlled.  Discharge instructions and GI contact information entered into discharge navigator.    Discussed with primary medicine team.    Gastroenterology follow up recommendations:   Virtual follow-up with Dr. Navarro in 1-month (already scheduled for 11/29/23).    Thank you for  involving us in this patient's care. Please do not hesitate to contact the GI service with any questions or concerns.     Pt seen and care plan discussed with Dr. Navarro and Dr. Haynes, GI staff physicians.    Overall time spent on the date of this encounter preparing to see the patient (including chart review of available notes, clinical status events, imaging and labs); obtaining and/or reviewing separately obtained history ; ordering medications, tests or procedures; communicating with other health care professionals; and documenting the above clinical information in the electronic medical record was 90 minutes.      Pauline Mccollum PA-C  GI Service  [facility]  Text Page  -------------------------------------------------------------------------------------------------------------------       Reason for Consultation:   S/p GPOEM           History of Present Illness:   Sofie Rodriguez is a 63 year old female with a PMHx of end stage COPD s/p bilateral lung transplant on 6/28/22 complicated by acute limb ischemia of LUE s/p left radial thrombectomy, h/o C. Diff, h/o EBV viremia, ESRD on dialysis, hemobilia s/p ERCP presumably due to hemorrhage into gallbladder, chronic diarrhea, gastroparesis s/p PEGJ placement and now admitted 10/9/23 for observation s/p Gastric peroral endoscopic myotomy (G-POEM) with Dr. Navarro.    Patient seen and examined at 10:45 (running on HD). History is obtained from patient who reports completed her course of Ciprofloxacin for SIBO but has continued to have issues with gas/bloating, diarrhea (x3 loose stools so far today) and inability to consistently tolerate advancement of TF to goal PTA. Working with RD to improve po intakes/tolerance and able to have stable weight and avoid the need to begin alternative TPN.      Post procedure reports mild abdominal pain but overall feels good and tolerating TF (currently @ 30 ml/hr) thus far without increased abd  pain nor N/V.  C/o hunger and looking forward to advancing her diet.         Procedure:  10/9/2023: EGD and G-POEM  Findings:       The esophagus was normal.        There was evidence of an intact gastrostomy with a patent GJ-tube        present in the gastric body extending into the duodneum. This was        characterized by healthy appearing mucosa. Small amount of retained food        and fluid in the stomach.        The examined duodenum was normal.        Localized moderate mucosal changes characterized by nodularity were        found at the pylorus at the 11 o'clock position. Likely hyperplastic        from GJ tube.        Preparations were made for gastric peroral endoscopic myotomy (G-POEM).        The stomach was cleaned and irrigated using saline and suctioned.        Mucosotomy followed by myotomy were performed in a greater curvature        orientation. First, a submucosal injection of a solution of methylene        blue and saline was used to lift the mucosa at the site of the initial        mucosotomy. The initial mucosal incision was made transversely at        approximately 4.0 cm proximal to the pylorus using a HybridKnife I-Type.        Next, the endoscope with a clear cap was used to enter into the        submucosal tunnel. The submucosal tunnel was then further created by        dissection of the submucosal fibers distally to the pyloric ring. A full        thickness myotomy (in a greater curvature orientation) was then        performed beginning at the pylorus using an ITknife2. The myotomy was        extended to 3.0 cm proximal to the pylorus. Intra procedure bleeding was        mild. A small Coagrasper was used effectively for hemostasis. After        completion of the myotomy, examination of the stomach and duodenum        showed no inadvertent mucosotomy. There was no evidence of bleeding        noted on the inspection of the myotomy edges and submucosal tunnel. The        myotomy was  successfully performed. The mucosal entrance to the        submucosal tunnel was closed using endoscopic suturing. After G-POEM and        endoscope removal, the patient was examined. The patient's abdomen was        nondistended. Pylorus lumen open without resistance to passage.   Impression:    - Normal esophagus.                          - GJ tube in place and not manipulated                          - Normal examined duodenum.                          - Nodularity was found at the pylorus at the 11                          o'clock position. Likely hyperplastic from GJ tube.                          - Gastric peroral endoscopic myotomy (G-POEM) was                          performed along greater curvature as described above.                          Mucosotomy closed with endoscopic suturing.                          - No specimens collected.           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    Hepatitis 2017    Hep C, [facility]    HTN (hypertension)     Lung infection 11/30/2022    Osteopenia             Past Surgical History:   Reviewed and edited as appropriate   Past Surgical History:   Procedure Laterality Date    BRONCHOSCOPY (RIGID OR FLEXIBLE), DIAGNOSTIC N/A 8/2/2022    Procedure: BRONCHOSCOPY, DIAGNOSTIC- inspection Bronch;  Surgeon: Kamala Lovell MD;  Location: [facility]    BRONCHOSCOPY (RIGID OR FLEXIBLE), DIAGNOSTIC N/A 9/13/2022    Procedure: INSPECTION BRONCHOSCOPY, WITH BRONCHOALVEOLAR LAVAGE;  Surgeon: Jose R Mccullough MD;  Location: [facility]    BRONCHOSCOPY (RIGID OR FLEXIBLE), DIAGNOSTIC N/A 11/9/2022    Procedure: BRONCHOSCOPY, WITH BRONCHOALVEOLAR LAVAGE AND BIOPSY;  Surgeon: Cesar Lima MD;  Location: [facility]     BRONCHOSCOPY (RIGID OR FLEXIBLE), DIAGNOSTIC N/A 1/25/2023    Procedure: BRONCHOSCOPY, WITH BRONCHOALVEOLAR LAVAGE AND BIOPSY;  Surgeon: Mason Reddy MD;  Location:  GI    BRONCHOSCOPY (RIGID OR FLEXIBLE), DIAGNOSTIC N/A 4/19/2023    Procedure: BRONCHOSCOPY, WITH BRONCHOALVEOLAR LAVAGE AND BIOPSY;  Surgeon: Kamala Lovell MD;  Location:  GI    BRONCHOSCOPY (RIGID OR FLEXIBLE), DIAGNOSTIC N/A 7/12/2023    Procedure: BRONCHOSCOPY, WITH BRONCHOALVEOLAR LAVAGE AND BIOPSY;  Surgeon: Cesar Lima MD;  Location:  GI    BRONCHOSCOPY FLEXIBLE AND RIGID N/A 07/19/2022    Procedure: BRONCHOSCOPY inspection only;  Surgeon: Bob Liao MD;  Location:  GI    COLONOSCOPY  2015    CV CORONARY ANGIOGRAM N/A 06/30/2021    Procedure: CV CORONARY ANGIOGRAM;  Surgeon: Alexander Cuellar MD;  Location:  HEART [facility]ARD[facility] CATH LAB    CV RIGHT HEART CATH MEASUREMENTS RECORDED N/A 06/30/2021    Procedure: CV RIGHT HEART CATH;  Surgeon: Alexander Cuellar MD;  Location:  HEART [facility]ARD[facility] CATH LAB    ENDOSCOPIC PERORAL MYOTOMY N/A 10/9/2023    Procedure: MYOTOMY, ESOPHAGUS, ENDOSCOPIC, ORAL APPROACH;  Surgeon: Gonzalez Navarro MD;  [facility]: [facility]    ENDOSCOPIC RETROGRADE CHOLANGIOPANCREATOGRAM N/A 8/11/2022    Procedure: ENDOSCOPIC RETROGRADE CHOLANGIOPANCREATOGRAPHY WITH PANCREATIC DUCT NEEDLE KNIFE AND STENT PLACEMENT, BILE DUCT SPHINCTEROTOMY, BLOOD/DEBRIS REMOVAL AND STENT PLACEMENT;  Surgeon: Cosmo Arroyo MD;  Location: [facility]    ENDOSCOPIC RETROGRADE CHOLANGIOPANCREATOGRAM N/A 10/7/2022    Procedure: ENDOSCOPIC RETROGRADE CHOLANGIOPANCREATOGRAPHY with biliary and pancreatic stent removal, debris removal;  Surgeon: Cosmo Arroyo MD;  Location:  OR    ENT SURGERY  1974    tonsillectomy    ENTEROSCOPY SMALL BOWEL N/A 8/11/2022    Procedure: SMALL BOWEL ENTEROSCOPY;  Surgeon: Cosmo Arroyo MD;  Location:  OR    ESOPHAGOGASTRODUODENOSCOPY, WITH NASOGASTRIC TUBE INSERTION N/A 07/01/2022     Procedure: ESOPHAGOGASTRODUODENOSCOPY, WITH NASOJEJUNAL TUBE INSERTION;  Surgeon: Ozzy Nickerson MD;  Location: [facility]    ESOPHAGOSCOPY, GASTROSCOPY, DUODENOSCOPY (EGD), COMBINED N/A 8/3/2022    Procedure: ESOPHAGOGASTRODUODENOSCOPY (EGD);  Surgeon: Ira Andres MD;  Location: [facility]    ESOPHAGOSCOPY, GASTROSCOPY, DUODENOSCOPY (EGD), COMBINED N/A 1/25/2023    Procedure: ESOPHAGOGASTRODUODENOSCOPY (EGD) with botox injection;  Surgeon: Gonzalez Navarro MD;  Location: [facility]    ESOPHAGOSCOPY, GASTROSCOPY, DUODENOSCOPY (EGD), COMBINED N/A 10/9/2023    Procedure: ESOPHAGOGASTRODUODENOSCOPY;  Surgeon: Gonzalez Navarro MD;  Location: [facility]    HAND SURGERY      INSERT CHEST TUBE Right 9/13/2022    Procedure: Insert chest tube;  Surgeon: JoseR Mccullough MD;  Location: [facility]    IR CVC TUNNEL PLACEMENT > 5 YRS OF AGE  9/26/2022    IR GASTRO JEJUNOSTOMY TUBE CHANGE  8/31/2022    IR GASTRO JEJUNOSTOMY TUBE CHANGE  12/21/2022    IR GASTRO JEJUNOSTOMY TUBE CHANGE  7/12/2023    IR GASTRO JEJUNOSTOMY TUBE CHANGE  8/18/2023    IR GASTRO JEJUNOSTOMY TUBE PLACEMENT  7/27/2022    IR THORACENTESIS  8/29/2022    LEEP TX, CERVICAL  04/07/2017    HECTOR III    LYMPH NODE BIOPSY Left 2005    Left axilla, benign- [facility]    MIDLINE INSERTION - DOUBLE LUMEN Left 07/28/2022    20cm, Basilic vein    REPLACE GASTROJEJUNOSTOMY TUBE, PERCUTANEOUS  10/7/2022    Procedure: Replace Gastrojejunostomy Tube;  Surgeon: Cosmo Arroyo MD;  Location: [facility]    THORACENTESIS Left 8/29/2022    Procedure: THORACENTESIS;  Surgeon: Bo Capone PA-C;  Location: [facility]    THORACENTESIS Left 9/13/2022    Procedure: Thoracentesis;  Surgeon: Jose R Mccullough MD;  Location: [facility]    THROMBECTOMY UPPER EXTREMITY Left 07/02/2022    Procedure: LEFT RADIAL ARM THROMBECTOMY;  Surgeon: Christie Graham MD;  [facility]: [facility]    TRANSPLANT LUNG RECIPIENT SINGLE X2 Bilateral 06/28/2022    Procedure: Clamshell Incision, Bilateral  Sequential Lung Transplant, On Cardiopulmonary Bypass, Flexible Bronchoscopy;  Surgeon: Sue Sunshine MD;  Location: [facility]              Social History:   The patient lives in [place], [place].  Single.         Family History:   Patient's family history is reviewed today and is non-contributory    History reviewed. No pertinent family history.          Allergies:   Reviewed and edited as appropriate     Allergies   Allergen Reactions    Blood Transfusion Related (Informational Only) Other (See Comments)     Patient has a history of a clinically significant antibody against RBC antigens.  A delay in compatible RBCs may occur.     No Clinical Screening - See Comments Other (See Comments)     Patient has a history of a clinically significant antibody against RBC antigens.  A delay in compatible RBCs may occur.    Azithromycin Rash     12/1/22: Developed a rash that is not raised and looks diffuse in nature. It started in the groin and up the back and has now worked its way up her chest into her face. Pt states that it has now started to itch. She is breathing and talking normally and denies any airway changes. Unclear what started rash. Pt noted feeling somewhat itchy yesterday.    Ganciclovir Rash     12/1/22: Developed a rash that is not raised and looks diffuse in nature. It started in the groin and up the back and has now worked its way up her chest into her face. Pt states that it has now started to itch. She is breathing and talking normally and denies any airway changes. Unclear what started rash. Pt noted feeling somewhat itchy yesterday.            Medications:     Current Facility-Administered Medications   Medication    calcium carbonate-vitamin D (OSCAL) 500-5 MG-MCG per tablet 1 tablet    cyanocobalamin (VITAMIN B-12) tablet 500 mcg    [START ON 10/11/2023] dapsone suspension 50 mg    HYDROmorphone (DILAUDID) injection 0.2 mg    loperamide (IMODIUM) liquid 2 mg    metoprolol (LOPRESSOR) suspension 25 mg     multivitamin RENAL (RENAVITE RX/NEPHROVITE) tablet 1 mg    multivitamin w/minerals (THERA-VIT-M) tablet 1 tablet    naloxone (NARCAN) injection 0.2 mg    naloxone (NARCAN) injection 0.2 mg    naloxone (NARCAN) injection 0.4 mg    naloxone (NARCAN) injection 0.4 mg    No heparin via hemodialysis machine    ondansetron (ZOFRAN ODT) ODT tab 4 mg    Or    ondansetron (ZOFRAN) injection 4 mg    pantoprazole (PROTONIX) IV push injection 40 mg    piperacillin-tazobactam (ZOSYN) 2.25 g vial to attach to  ml bag    predniSONE (DELTASONE) solution 5 mg    And    predniSONE (DELTASONE) solution 2.5 mg    sevelamer carbonate (RENVELA) Packet 0.8 g    sodium chloride 0.9% BOLUS 100-150 mL    sodium chloride 0.9% BOLUS 250 mL    sodium chloride 0.9% BOLUS 300 mL    tacrolimus (GENERIC) suspension 4 mg             Review of Systems:     A complete review of systems was performed and is negative except as noted in the HPI           Physical Exam:   Temp: 98.6  F (37  C) Temp src: Oral BP: 132/70 Pulse: 79   Resp: 20 SpO2: 94 % O2 Device: Oxymask Oxygen Delivery: 1/2 LPM  Wt:   Wt Readings from Last 2 Encounters:   10/09/23 51.1 kg (112 lb 10.5 oz)   09/01/23 49.4 kg (109 lb)      General: Pleasant female seen on HD run and in NAD.  Answers appropriately.    HEENT: Head is AT/NC. Sclera anicteric. No conjunctival injection.    Lungs: Non-labored breathing on RA.   Heart: Regular rate and rhythm.  No murmurs, gallops or rubs.  Normal S1 and S2.  Abdomen: Soft, non-distended, appropriately tender to palpation in epigastric area but no rebound or peritoneal signs.   Extremities: WWP, no pedal edema.  MSK: no gross deformity, mild sx of clavicular, temporal and UE/LE muscle wasting.  Skin: No jaundice or rash  Neurologic: Grossly non-focal.  CN 2-12 grossly intact.   Psych: mood appropriate to situation           Data:   Labs and imaging below were independently reviewed and interpreted    LAB WORK:    BMP  Recent Labs   Lab  10/10/23  0618 10/09/23  1302     --    POTASSIUM 4.1  --    CHLORIDE 101  --    ESTUARDO 8.9  --    CO2 26  --    BUN 34.2*  --    CR 5.70*  --    * 102*     CBC  Recent Labs   Lab 10/10/23  0618   WBC 4.5   RBC 3.32*   HGB 12.0   HCT 38.9   *   MCH 36.1*   MCHC 30.8*   RDW 14.7   *     INRNo lab results found in last 7 days.  LFTsNo lab results found in last 7 days.   PANCNo lab results found in last 7 days.    IMAGING:  (Personally reviewed)    No results found for this visit on 10/09/23.         =======================================================================

## 2023-10-10 NOTE — DISCHARGE INSTRUCTIONS
"Gastroenterology Discharge Instructions:  - Continue with the following diet progression at discharge:  - Liquid diet x3 days (10/10-10/12).  If tolerates then,  - Advance to Soft diet on day 4, 5,6 (10/13-10/15). If tolerates, then,  - Advance to regular diet on POD#7 (starting 10/16).    -Continue your Jtube tube feedings per your outpatient dietitian.    -Continue with your acid blocker medication (pantoprazole 40 mg twice daily) for 1 month after your procedure.    -If your insurance will cover, recommend Rifaximin 550 mg three times a day for 2 weeks to treat your small intestinal bacterial overgrowth (SIBO).    -Follow up for virtual visit with Dr. Navarro as currently scheduled on 11/29/23    -If you develop any of the following, please contact your GI [facility] ([facility]) at [phone]  or if after hours call [phone] to speak with a triage nurse:  Fevers over 101, +/- chills.  Significant nausea/vomiting causing inability to take in fluids depleting hydration.  Severe pain, ongoing symptoms (pain, nausea) that cannot be controlled with prescribed or over the counter medications.  Signs of dehydration (pale skin, low blood pressure, lightheadedness, dark urine, \"sticky\" skin when pinched, rapid heart rate, little to no urine output).       "

## 2023-10-10 NOTE — PROGRESS NOTES
Observation Goals:  -diagnostic tests and consults completed and resulted: Not Met   -vital signs normal or at patient baseline: Not Met   -safe disposition plan has been identified: Unknown

## 2023-10-10 NOTE — PLAN OF CARE
Vitals:    10/10/23 1230 10/10/23 1245 10/10/23 1300 10/10/23 1302   BP: 128/73 130/71 132/75 132/75   BP Location: Right arm Right arm Right arm Right arm   Pulse: 76 76 97 98   Resp: 11 17 21 21   Temp:       TempSrc:       SpO2: 95% 97% 97%    Weight:       Height:       Alert and oriented,  Afebrile vitally stable, sating 97% non labor breathing   Belly soft +BS passing gas, had loose stool, voiding adequate   Had dialysis, took out 2 lit, denies pain, right CVC intact, PIV SL except Zosyn antibiotic  Upper GI is done, discharge script written, teaching has been done,   Waiting on upper GI to be read and discharge home,   Continue with plan of care.

## 2023-10-10 NOTE — PROGRESS NOTES
HEMODIALYSIS TREATMENT NOTE    Date: 10/10/2023  Time: 11:17 AM    Data:  Weight change:2.0kg  Ultrafiltration - Post Run Net Total Removed (mL):2000    Vascular Access Status: patent  Dialyzer Rinse: streaked  Total Blood Volume Processed:   67.2 Liters  Total Dialysis (Treatment) Time:  3.0 Hours    Lab:   Yes    Interventions:  Hep B status obtained. Pt tolerated tx 3.0Hr and 2.0L UF removal. CVC locked w saline and caps changed. No medications administered related to diaysis   CVC dressing changed   Assessment:  A&ox3. Hr-RRR. 20rpm. Lung sounds diminished ant & post BUL/BLL. Edema present in ble. Pt denies complaints since last tx. CVC C,D,&I- CVC dressing changed. CVC locked w saline and caps changed. Pt denies complaints of pain.       Plan:    Per renal and pcn

## 2023-10-10 NOTE — PROGRESS NOTES
Observation Goals:  -diagnostic tests and consults completed and resulted: Not Met   -vital signs normal or at patient baseline: Not Met   -safe disposition plan has been identified: Unknown       Temp: 98.9  F (37.2  C) Temp src: Oral BP: 134/73 Pulse: 83   Resp: 24 SpO2: 92 % O2 Device: Nasal cannula Oxygen Delivery: 1/2 LPM    Patient slept well overnight. A&Ox4. Denies pain. J-tube running tube feeding at 30 mL/hr. Right PIV TKO for antibiotics. Voiding. Loose BM. Up SBA.

## 2023-10-10 NOTE — DISCHARGE SUMMARY
[facility]  Discharge Summary - Medicine & Pediatrics       Date of Admission:  10/9/2023  Date of Discharge:  10/10/2023  Discharging Provider: Dr. Trenton London, Dr. Breanna Hampton  Discharge Service: Medicine Service, NILE TEAM 5    Discharge Diagnoses   Gastroparesis s/p G-POEM 10/9/23    Chronic:  Diarrhea 2/2 SIBO   ESRD  COPD s/p lung transplant  HTN    Clinically Significant Risk Factors          Follow-ups Needed After Discharge   Follow-up Appointments     Adult [facility] Follow-up and recommended labs and tests      See your PCP in 1-2 weeks to check on your rash and check your platelets.   If the rash gets worse or starts hurting/itching/blistering, call your   primary care provider. Follow up as scheduled with Dr. Navarro.    Appointments on [place] and/or [facility] (with [facility] or [facility]   provider or service). Call [phone] if you haven't heard regarding   these appointments within 7 days of discharge.            Unresulted Labs Ordered in the Past 30 Days of this Admission       Date and Time Order Name Status Description    10/10/2023  9:57 AM Hepatitis B Surface Antibody In process     10/10/2023  9:57 AM Hepatitis B surface antigen In process         These results will be followed up by hospitalist pool.    Discharge Disposition   Discharged to home  Condition at discharge: Stable    Hospital Course   Sofie Rodriguez was admitted on 10/9/2023 for observation post-GPOEM. 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 Diff, h/o EBV viremia, ESRD on dialysis, hemobilia s/p ERCP presumably due to hemorrhage into gallbladder, gastroparesis s/p PEGJ and diarrhea related to SIBO. She is admitted for close observation following a G-POEM (esophageal myotomy).     Gastroparesis s/p G-POEM 10/9/23  Procedure done by Dr. Navarro with no complications.  Monitored overnight with no abdominal pain or other concerns. J tube feeds started without issues. On IV Zosyn and IV PPI during hospitalization. XR gastrografin done on day of discharge and within normal.  - PPI BID x 1 month per GI (though she was already on this PTA)  - Diet:   Liquid diet (CL/FL) x3 days (10/10-10/12).  If tolerates then,  - Advance to Soft diet on day 4, 5,6 (10/13-10/15). If tolerates, then,  - Advance to regular diet on POD#7 (starting 10/16).   - Leave G-tube to gravity and continue J-tube feeds as tolerated.    - No anticoagulation or antiplatelets for 3-days.   - HOB elevation to 30-45 degrees.    - Virtual follow-up with Dr. Navarro in 1-month.      Diarrhea 2/2 SIBO  Had inpatient diarrhea work up (negative colonoscopy and infectious work up) that was c/w osmotic diarrhea likely r/t malabsorptive process. Given associated gas/bloating symptoms, small bowel dysmotility and J-tube feeding intolerance, high suspicion for SIBO which was confirmed on positive hydrogen breath test 9/1/23. Previously prescribed Rifaximin 550 mg TID x 2 weeks but insurance denied.  Alternatively prescribed and completed Cipro 500 mg BID x 14 days but failed this treatment as patient continues to c/o gas/bloating, diarrhea and inability to tolerate TF to goal rate.  - Rifaximin 550 mg BID x 2 weeks sent again to pharmacy, letter of medical necessity sent to pharmacy liaison given failure of alternative treatment with ciprofloxacin. If insurance approves, patient will be notified and she will come  rifaximin from the discharge pharmacy.       Thrombocytopenia  Petechiae  Platelets mildly low on day of discharge. Patient has been noticing intermittent petechial rash on arms, and after her GPOEM procedure also has small amount on her face (see media uploaded pictures). Discussed with transplant team and low concern for transplant related rash but we do recommend f/up CBC and exam with her primary care provider.    - CBC, follow up petechiae    ESRD  HD on 10/10/23 per routine.      COPD s/p lung transplant  -PTA tacrolimus 4 mg BID  -Prednisone 5 mg qAM, 2.5 mg qPM  -Dapsone 50 mg MWF for PJP ppx  -Per chart review, azathioprine stopped earlier this year and has not been restarted by SOT team     HTN  -Continue PTA metoprolol tartrate 25 mg BID      Consultations This Hospital Stay   NEPHROLOGY ESRD ADULT IP CONSULT  GI PANCREATICOBILIARY ADULT IP CONSULT    Code Status   Full Code         Physician Attestation   I, Breanna Hampton, was present with the medical resident Dr. Trenton London who participated in the service and in the documentation of the note.  I have verified the history and personally performed the physical exam and medical decision making.  I agree with the assessment and plan of care as documented in the note.        Please see A&P for additional details of medical decision making.    I have personally reviewed the following data over the past 24 hrs:    4.5  \   12.0   / 125 (L)     140 101 34.2 (H) /  122 (H)   4.1 26 5.70 (H) \     Procal: N/A CRP: N/A Lactic Acid: 0.6 (L)         Breanna Altman (Kae) MD [facility]  Internal Medicine/Pediatrics  Hospitalist    Date of Service (when I saw the patient): 10/10/23       [address] [place] [id number]  Phone: [phone]  ______________________________________________________________________    Physical Exam   Vital Signs: Temp: 97.5  F (36.4  C) Temp src: Oral BP: 133/69 Pulse: 76   Resp: 16 SpO2: 93 % O2 Device: None (Room air) Oxygen Delivery: 1/2 LPM  Weight: 112 lbs 10.48 oz    General: awake, alert, in no acute distress  HEENT: NCAT, sclera anicteric, no nasal discharge, MMM  CV: RRR, no murmurs noted  Resp: CTAB, no increased WOB  Abd: Soft, nontender, nondistended, J tube in place  MSK: mild peripheral edema, extremities warm and well perfused  Skin: warm, dry  Neuro: Alert and oriented  x4.    Primary Care Physician   Vinny Berrios    Discharge Orders      Reason for your hospital stay    You were in the hospital for an esophageal myomectomy procedure. Dr. Navarro will set up a virtual appointment with you in about a month.   Follow a liquid diet for three days (10/10-10/12). Soft foods only on days 4, 5,6 (10/13-10/15). Regular diet starting on 10/16 as tolerated.     Activity    Your activity upon discharge: activity as tolerated     Adult [facility] Follow-up and recommended labs and tests    See your PCP in 1-2 weeks to check on your rash and check your platelets. If the rash gets worse or starts hurting/itching/blistering, call your primary care provider. Follow up as scheduled with Dr. Navarro.    Appointments on [place] and/or [facility] (with [facility] or [facility] provider or service). Call [phone] if you haven't heard regarding these appointments within 7 days of discharge.     Diet    Follow this diet upon discharge:   Liquid diet for three days (10/10-10/12). Soft foods only on days 4, 5,6 (10/13-10/15). Regular diet starting on 10/16 as tolerated.       Significant Results and Procedures   Most Recent 3 CBC's:  Recent Labs   Lab Test 10/10/23  0618 07/25/23  1230 07/11/23  1217   WBC 4.5 5.2 4.2   HGB 12.0 11.0* 12.8   * 116* 113*   * 150 160     Most Recent 3 BMP's:  Recent Labs   Lab Test 10/10/23  1547 10/10/23  0618 10/09/23  1302 09/06/23  0846 08/28/23  0815 07/28/23  0742 07/25/23  1230 07/14/23  0818 07/11/23  1217   NA  --  140  --   --   --   --  137  --  140   POTASSIUM  --  4.1  --   --   --   --  4.2  --  4.4   CHLORIDE  --  101  --  105 104   < > 102   < > 101   CO2  --  26  --   --   --   --  27  --  29   BUN  --  34.2*  --   --   --   --  12.8  --  10.2   CR  --  5.70*  --   --   --   --  3.20*  --  3.40*   ANIONGAP  --  13  --   --   --   --  8  --  10   ESTUARDO  --  8.9  --   --   --   --  9.0  --  9.4   * 132* 102*  --   --   --  124*  --  104*    <  > = values in this interval not displayed.     Most Recent 2 LFT's:  Recent Labs   Lab Test 07/11/23  1217 05/19/23  0700   AST 23 22   ALT 7 <5*   ALKPHOS 65 56   BILITOTAL 0.6 0.6   ,   Results for orders placed or performed in visit on 08/29/23   XR Chest 2 Views    Narrative    EXAM: XR CHEST 2 VIEWS  8/29/2023 10:59 AM     HISTORY:  S/P lung transplant (H)       COMPARISON:  7/25/2023    TECHNIQUE: PA and lateral views of the chest    FINDINGS:   Right IJ CVC with tip in the distal SVC. Post surgical changes of  bilateral lung transplantation. Partially visualized percutaneous  gastrojejunostomy tube.    The trachea is midline. The cardiomediastinal silhouette is within  normal limits. Atherosclerotic calcifications of the aortic arch. No  appreciable pneumothorax. Unchanged blunting of the left costophrenic  angle which may represent scarring versus effusion. No right pleural  effusion. Stable streaky perihilar and bibasilar opacities. No new  focal airspace opacity. No acute osseous abnormality.      Impression    IMPRESSION:  1. Unchanged blunting of the left costophrenic angle which may  represent a small effusion versus scarring.  2. Streaky perihilar and bibasilar opacities likely represent  atelectasis.    I have personally reviewed the examination and initial interpretation  and I agree with the findings.    CECI REGAN,          SYSTEM ID:  [id number]     *Note: Due to a large number of results and/or encounters for the requested time period, some results have not been displayed. A complete set of results can be found in Results Review.       Discharge Medications   Current Discharge Medication List        START taking these medications    Details   rifaximin (XIFAXAN) 550 MG TABS tablet Take 1 tablet (550 mg) by mouth 2 times daily for 14 days  Qty: 28 tablet, Refills: 0    Comments: Test script- Call Chris Royal with patient cost- thx! [phone]  Associated Diagnoses: Small intestinal bacterial  overgrowth (SIBO)           CONTINUE these medications which have CHANGED    Details   azaTHIOprine (IMURAN) 5 mg/mL SUSP 20 mLs (100 mg) by Per J Tube route daily ON HOLD 5/8/2023 FOR LOW WBC  Qty: 600 mL, Refills: 11    Comments: Continue to hold per transplant team.  Associated Diagnoses: S/P lung transplant (H)           CONTINUE these medications which have NOT CHANGED    Details   alcohol swab prep pads Use to swab area of injection/amos as directed.  Qty: 100 each, Refills: 6    Associated Diagnoses: Steroid-induced hyperglycemia      B Complex-C-Folic Acid (DIALYVITE) TABS Take 1 tablet by mouth every morning      blood glucose (CONTOUR NEXT TEST) test strip Use to test blood sugar 4 times daily, as directed. Okay to use whatever brand insurance will cover.  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 Okay to use whatever brand insurance will cover.  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  Qty: 1 kit, Refills: 0    Associated Diagnoses: Steroid-induced hyperglycemia      Calcium Carbonate-Vitamin D 600-10 MG-MCG TABS 1 tablet by Per J Tube route 3 times daily  Qty: 90 tablet, Refills: 11    Associated Diagnoses: Diaphragm dysfunction      cyanocobalamin (CYANOCOBALAMIN) 500 MCG tablet 1 tablet (500 mcg) by Per Feeding Tube route daily  Qty: 30 tablet, Refills: 11    Associated Diagnoses: Anemia of chronic renal failure, stage 3b (H)      dapsone 2 mg/mL SUSP 25 mLs (50 mg) by Per J Tube route Every Mon, Wed, Fri Morning  Qty: 1000 mL, Refills: 11    Associated Diagnoses: S/P lung transplant (H)      dicyclomine  (BENTYL) 10 MG/5ML solution Take by mouth as needed      loperamide (IMODIUM A-D) 2 MG tablet 1 tablet (2 mg) by Per J Tube route 4 times daily as needed for diarrhea    Associated Diagnoses: Functional diarrhea      metoprolol tartrate (LOPRESSOR) 50 MG tablet 0.5 tablets (25 mg) by Per Feeding Tube route 2 times daily  Qty: 60 tablet, Refills: 11    Associated Diagnoses: S/P lung transplant (H)      multivitamin (CENTRUM SILVER) tablet Take 1 tablet by mouth daily    Associated Diagnoses: S/P lung transplant (H)      pantoprazole (PROTONIX) 2 mg/mL SUSP suspension 20 mLs (40 mg) by Per J Tube route 2 times daily  Qty: 1200 mL, Refills: 11    Associated Diagnoses: S/P lung transplant (H)      predniSONE (DELTASONE) 5 MG tablet Take 1 tab (5mg) at AM and 1/2 tab (2.5) in pm  Qty: 135 tablet, Refills: 3    Comments: TXP DT 6/28/2022 (Lung) TXP Dischg DT  DX Lung replaced by transplant Z94.2 [facility] ([place], [place])  Associated Diagnoses: Lung replaced by transplant (H)      protein modular (PROSOURCE TF) LIQD 1 packet by Per Feeding Tube route daily  Qty: 30 packet, Refills: 11    Associated Diagnoses: S/P lung transplant (H)      Sharps Container MISC 1 sharps container  Qty: 1 each, Refills: 0    Associated Diagnoses: Steroid-induced hyperglycemia      tacrolimus (GENERIC EQUIVALENT) 1 mg/mL suspension Take 4 mLs (4 mg) by mouth 2 times daily  Qty: 240 mL, Refills: 11    Comments: TXP DT 6/28/2022 (Lung) TXP Dischg DT 8/17/2022 DX Lung replaced by transplant Z94.2 [facility] ([place], [place])  Associated Diagnoses: S/P lung transplant (H)      sevelamer carbonate, RENVELA, 0.8 GM PACK Packet Take 1 packet (0.8 g) by mouth 3 times daily (with meals)    Associated Diagnoses: S/P lung transplant (H)           Allergies   Allergies   Allergen Reactions    Blood Transfusion Related (Informational Only) Other (See  Comments)     Patient has a history of a clinically significant antibody against RBC antigens.  A delay in compatible RBCs may occur.     No Clinical Screening - See Comments Other (See Comments)     Patient has a history of a clinically significant antibody against RBC antigens.  A delay in compatible RBCs may occur.    Azithromycin Rash     12/1/22: Developed a rash that is not raised and looks diffuse in nature. It started in the groin and up the back and has now worked its way up her chest into her face. Pt states that it has now started to itch. She is breathing and talking normally and denies any airway changes. Unclear what started rash. Pt noted feeling somewhat itchy yesterday.    Ganciclovir Rash     12/1/22: Developed a rash that is not raised and looks diffuse in nature. It started in the groin and up the back and has now worked its way up her chest into her face. Pt states that it has now started to itch. She is breathing and talking normally and denies any airway changes. Unclear what started rash. Pt noted feeling somewhat itchy yesterday.

## 2023-10-10 NOTE — CONSULTS
Nephrology Initial Consult  October 10, 2023      Sofie Rodriguez MRN:[id number] YOB: 1960  Date of Admission:10/9/2023  Primary care provider: Vinny Berrios  Requesting physician: Breanna Hampton    ASSESSMENT AND RECOMMENDATIONS:   Sofie Rodriguez is a 63 year old female admitted on 10/9/2023. 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 Diff, h/o EBV viremia, ESRD on dialysis, hemobilia s/p ERCP presumably due to hemorrhage into gallbladder, gastroparesis s/p PEGJ and diarrhea related to SIBO. She is admitted for close observation following a  G-POEM (esophageal myotomy).     # ESKD:   -dialyzes TTS at [facility] with Dr. Fairbanks.   -Access: TDC, had LUE AVF placed 2/17/23, s/p transposition surgery (thrombosed). S/p left upper arm arteriovenous fistular graft placment on 8/11 by Dr Hickman.   -EDW 50 kg,  Run time: 3 hrs w/o heparin.     - Dialysis per TTS schedule  -HD today with ~2L UF      # BP/volume:   eDW 50 kg ; BP is well controlled with metoprolol 25 mg bid  --continue with PTA metoprolol         # Anemia of CKD:   hgb 12's. Receives Mircera monthly and venofer weekly.   - no indication for JOCELINE     # BMD:   Ca normal,  PTH 46 (9/26/22)  - obtain phos and PTH levels       Recommendations were communicated to primary team via verbal/note    Seen and discussed with Dr. Coleen Saravia MD   Division of Renal Disease and Hypertension  [facility]  myairmail  Vocera Web Console      REASON FOR CONSULT: ESRD     HISTORY OF PRESENT ILLNESS:  Admitting provider and nursing notes reviewed  Sofie Rodriguez is a 63 year old female admitted on 10/9/2023. 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 Diff, h/o EBV viremia, ESRD on dialysis, hemobilia s/p ERCP presumably due  to hemorrhage into gallbladder, gastroparesis s/p PEGJ and diarrhea related to SIBO. She is admitted for close observation following a  G-POEM (esophageal myotomy).     She dialysis tri-weekly and she has been adherent to her treatments. Her last HD was on Saturday. Her blood pressure is well controlled with metoprolol.       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    Hepatitis 2017    Hep C, [facility]    HTN (hypertension)     Lung infection 11/30/2022    Osteopenia        Past Surgical History:   Procedure Laterality Date    BRONCHOSCOPY (RIGID OR FLEXIBLE), DIAGNOSTIC N/A 8/2/2022    Procedure: BRONCHOSCOPY, DIAGNOSTIC- inspection Bronch;  Surgeon: Kamala Lovell MD;  [facility]: [facility]    BRONCHOSCOPY (RIGID OR FLEXIBLE), DIAGNOSTIC N/A 9/13/2022    Procedure: INSPECTION BRONCHOSCOPY, WITH BRONCHOALVEOLAR LAVAGE;  Surgeon: Jose R Mccullough MD;  [facility]    BRONCHOSCOPY (RIGID OR FLEXIBLE), DIAGNOSTIC N/A 11/9/2022    Procedure: BRONCHOSCOPY, WITH BRONCHOALVEOLAR LAVAGE AND BIOPSY;  Surgeon: Cesar Lima MD;  [facility]    BRONCHOSCOPY (RIGID OR FLEXIBLE), DIAGNOSTIC N/A 1/25/2023    Procedure: BRONCHOSCOPY, WITH BRONCHOALVEOLAR LAVAGE AND BIOPSY;  Surgeon: Mason Reddy MD;  [facility]    BRONCHOSCOPY (RIGID OR FLEXIBLE), DIAGNOSTIC N/A 4/19/2023    Procedure: BRONCHOSCOPY, WITH BRONCHOALVEOLAR LAVAGE AND BIOPSY;  Surgeon: Kamala Lovell MD;  [facility]    BRONCHOSCOPY (RIGID OR FLEXIBLE), DIAGNOSTIC N/A 7/12/2023    Procedure: BRONCHOSCOPY, WITH BRONCHOALVEOLAR LAVAGE AND BIOPSY;  Surgeon: Cesar Lima MD;  [facility]: [facility]    BRONCHOSCOPY FLEXIBLE AND RIGID N/A 07/19/2022    Procedure:  BRONCHOSCOPY inspection only;  Surgeon: Bob Liao MD;  Location:  GI    COLONOSCOPY  2015    CV CORONARY ANGIOGRAM N/A 06/30/2021    Procedure: CV CORONARY ANGIOGRAM;  Surgeon: Alexander Cuellar MD;  Location:  HEART CARDIAC CATH LAB    CV RIGHT HEART CATH MEASUREMENTS RECORDED N/A 06/30/2021    Procedure: CV RIGHT HEART CATH;  Surgeon: Alexander Cuellar MD;  Location:  HEART CARDIAC CATH LAB    ENDOSCOPIC PERORAL MYOTOMY N/A 10/9/2023    Procedure: MYOTOMY, ESOPHAGUS, ENDOSCOPIC, ORAL APPROACH;  Surgeon: Gonzalez Navarro MD;  [facility]    ENDOSCOPIC RETROGRADE CHOLANGIOPANCREATOGRAM N/A 8/11/2022    Procedure: ENDOSCOPIC RETROGRADE CHOLANGIOPANCREATOGRAPHY WITH PANCREATIC DUCT NEEDLE KNIFE AND STENT PLACEMENT, BILE DUCT SPHINCTEROTOMY, BLOOD/DEBRIS REMOVAL AND STENT PLACEMENT;  Surgeon: Cosmo Arroyo MD;  [facility]    ENDOSCOPIC RETROGRADE CHOLANGIOPANCREATOGRAM N/A 10/7/2022    Procedure: ENDOSCOPIC RETROGRADE CHOLANGIOPANCREATOGRAPHY with biliary and pancreatic stent removal, debris removal;  Surgeon: Cosmo Arroyo MD;  Location:  OR    ENT SURGERY  1974    tonsillectomy    ENTEROSCOPY SMALL BOWEL N/A 8/11/2022    Procedure: SMALL BOWEL ENTEROSCOPY;  Surgeon: Cosmo Arroyo MD;  Location:  OR    ESOPHAGOGASTRODUODENOSCOPY, WITH NASOGASTRIC TUBE INSERTION N/A 07/01/2022    Procedure: ESOPHAGOGASTRODUODENOSCOPY, WITH NASOJEJUNAL TUBE INSERTION;  Surgeon: Ozzy Nickerson MD;  Location:  GI    ESOPHAGOSCOPY, GASTROSCOPY, DUODENOSCOPY (EGD), COMBINED N/A 8/3/2022    Procedure: ESOPHAGOGASTRODUODENOSCOPY (EGD);  Surgeon: Ira Andres MD;  Location:  GI    ESOPHAGOSCOPY, GASTROSCOPY, DUODENOSCOPY (EGD), COMBINED N/A 1/25/2023    Procedure: ESOPHAGOGASTRODUODENOSCOPY (EGD) with botox injection;  Surgeon: Gonazlez Navarro MD;  Location:  GI    ESOPHAGOSCOPY, GASTROSCOPY, DUODENOSCOPY (EGD), COMBINED N/A 10/9/2023    Procedure:  ESOPHAGOGASTRODUODENOSCOPY;  Surgeon: Gonzalez Navarro MD;  [facility]    HAND SURGERY      INSERT CHEST TUBE Right 9/13/2022    Procedure: Insert chest tube;  Surgeon: Jose R Mccullough MD;  [facility]    IR CVC TUNNEL PLACEMENT > 5 YRS OF AGE  9/26/2022    IR GASTRO JEJUNOSTOMY TUBE CHANGE  8/31/2022    IR GASTRO JEJUNOSTOMY TUBE CHANGE  12/21/2022    IR GASTRO JEJUNOSTOMY TUBE CHANGE  7/12/2023    IR GASTRO JEJUNOSTOMY TUBE CHANGE  8/18/2023    IR GASTRO JEJUNOSTOMY TUBE PLACEMENT  7/27/2022    IR THORACENTESIS  8/29/2022    LEEP TX, CERVICAL  04/07/2017    HECTOR III    LYMPH NODE BIOPSY Left 2005    Left axilla, benign- [place]    MIDLINE INSERTION - DOUBLE LUMEN Left 07/28/2022    20cm, Basilic vein    REPLACE GASTROJEJUNOSTOMY TUBE, PERCUTANEOUS  10/7/2022    Procedure: Replace Gastrojejunostomy Tube;  Surgeon: Cosmo Arroyo MD;  [facility]    THORACENTESIS Left 8/29/2022    Procedure: THORACENTESIS;  Surgeon: Bo Capone PA-C;  Location: [facility] OR    THORACENTESIS Left 9/13/2022    Procedure: Thoracentesis;  Surgeon: Jose R Mccullough MD;  [facility]    THROMBECTOMY UPPER EXTREMITY Left 07/02/2022    Procedure: LEFT RADIAL ARM THROMBECTOMY;  Surgeon: Christie Graham MD;  [facility]    TRANSPLANT LUNG RECIPIENT SINGLE X2 Bilateral 06/28/2022    Procedure: Clamshell Incision, Bilateral Sequential Lung Transplant, On Cardiopulmonary Bypass, Flexible Bronchoscopy;  Surgeon: Sue Sunshine MD;  [facility]        MEDICATIONS:  PTA Meds  Prior to Admission medications    Medication Sig Last Dose Taking? Auth Provider Long Term End Date   alcohol swab prep pads Use to swab area of injection/amos as directed. Unknown Yes Susan Delacruz MD     azaTHIOprine (IMURAN) 5 mg/mL SUSP 20 mLs (100 mg) by Per J Tube route daily ON HOLD 5/8/2023 FOR LOW WBC  Yes Haim London MD Yes    B Complex-C-Folic Acid (DIALYVITE) TABS Take 1 tablet by mouth every  morning  Yes Reported, Patient     blood glucose (CONTOUR NEXT TEST) test strip Use to test blood sugar 4 times daily, as directed. Okay to use whatever brand insurance will cover. Unknown Yes Kaylin Triana PA-C     blood glucose (NO BRAND SPECIFIED) lancets standard Use to test blood sugar 4 times daily as directed. Okay to use whatever brand insurance will cover. Unknown Yes Kaylin Triana PA-C     blood glucose monitoring (CONTOUR NEXT MONITOR W/DEVICE KIT) meter device kit Use to test blood sugar 4 times daily or as directed. Unknown Yes Susan Delacruz MD     Calcium Carbonate-Vitamin D 600-10 MG-MCG TABS 1 tablet by Per J Tube route 3 times daily 10/8/2023 at 2000 Yes Claribel Franks MD     cyanocobalamin (CYANOCOBALAMIN) 500 MCG tablet 1 tablet (500 mcg) by Per Feeding Tube route daily 10/8/2023 at 0800 Yes Greg Pritchard MD     dapsone 2 mg/mL SUSP 25 mLs (50 mg) by Per J Tube route Every Mon, Wed, Fri Morning 10/9/2023 at 0800 Yes Greg Pritchard MD No    dicyclomine (BENTYL) 10 MG/5ML solution Take by mouth as needed  Yes Reported, Patient     loperamide (IMODIUM A-D) 2 MG tablet 1 tablet (2 mg) by Per J Tube route 4 times daily as needed for diarrhea Past Month Yes Nguyen Johnson, CNP     metoprolol tartrate (LOPRESSOR) 50 MG tablet 0.5 tablets (25 mg) by Per Feeding Tube route 2 times daily 10/9/2023 at 0800 Yes Claribel Franks MD Yes    multivitamin (CENTRUM SILVER) tablet Take 1 tablet by mouth daily 10/8/2023 at 0800 Yes Kaylin Triana PA-C     pantoprazole (PROTONIX) 2 mg/mL SUSP suspension 20 mLs (40 mg) by Per J Tube route 2 times daily 10/9/2023 at 0800 Yes Claribel Franks MD     predniSONE (DELTASONE) 5 MG tablet Take 1 tab (5mg) at AM and 1/2 tab (2.5) in pm 10/9/2023 at 0800 Yes Claribel Franks MD No    protein modular (PROSOURCE TF) LIQD 1 packet by Per Feeding Tube route daily  Patient taking differently: 1 packet by Per Feeding Tube route  daily at 2 pm Past Week Yes Claribel Franks MD     rifaximin (XIFAXAN) 550 MG TABS tablet Take 1 tablet (550 mg) by mouth 2 times daily for 14 days  Yes Haim London MD  10/24/23   Sharps Container MISC 1 sharps container Unknown Yes Claribel Franks MD     tacrolimus (GENERIC EQUIVALENT) 1 mg/mL suspension Take 4 mLs (4 mg) by mouth 2 times daily 10/9/2023 at 0800 Yes Claribel Franks MD Yes    sevelamer carbonate, RENVELA, 0.8 GM PACK Packet Take 1 packet (0.8 g) by mouth 3 times daily (with meals) 10/7/2023 at 1600  Kaylin Triana PA-C     albuterol (PROAIR HFA/PROVENTIL HFA/VENTOLIN HFA) 108 (90 BASE) MCG/ACT Inhaler Inhale 2 puffs into the lungs every 6 hours as needed for shortness of breath / dyspnea or wheezing   Reported, Patient Yes 8/15/22   furosemide (LASIX) 20 MG tablet Take 1 tablet (20 mg) by mouth daily   Kaylin Triana PA-C Yes 10/8/22   insulin glargine (LANTUS PEN) 100 UNIT/ML pen Inject 7 Units Subcutaneous 2 times daily   Susan Delacruz MD Yes 10/8/22   ipratropium - albuterol 0.5 mg/2.5 mg/3 mL (DUONEB) 0.5-2.5 (3) MG/3ML neb solution Inhale 1 vial into the lungs every 4 hours as needed for shortness of breath / dyspnea   Unknown, Entered By History Yes 8/15/22   mycophenolate (GENERIC EQUIVALENT) 200 MG/ML suspension 3.75 mLs (750 mg) by Per J Tube route 2 times daily   Claribel Franks MD Yes 10/8/22   valGANciclovir (VALCYTE) 50 MG/ML solution 9 mLs (450 mg) by Oral or Feeding Tube route three times a week  Patient taking differently: 450 mg by Oral or Feeding Tube route three times a week Take on Mon/Wed/Fri   Claribel Franks MD Yes 10/8/22      Current Meds   calcium carbonate-vitamin D  1 tablet Per J Tube TID    cyanocobalamin  500 mcg Per Feeding Tube Daily    [START ON 10/11/2023] dapsone  50 mg Per J Tube Q Mon Wed Fri AM    metoprolol  25 mg Oral or Feeding Tube BID    multivitamin RENAL  1 mg Oral QAM    multivitamin w/minerals  1 tablet Oral  Daily    pantoprazole  40 mg Intravenous BID    piperacillin-tazobactam  2.25 g Intravenous Q6H    predniSONE  5 mg Oral or Feeding Tube Daily    And    predniSONE  2.5 mg Oral or Feeding Tube QPM    sevelamer carbonate (RENVELA)  0.8 g Oral TID w/meals    tacrolimus  4 mg Oral BID     Infusion Meds      ALLERGIES:    Allergies   Allergen Reactions    Blood Transfusion Related (Informational Only) Other (See Comments)     Patient has a history of a clinically significant antibody against RBC antigens.  A delay in compatible RBCs may occur.     No Clinical Screening - See Comments Other (See Comments)     Patient has a history of a clinically significant antibody against RBC antigens.  A delay in compatible RBCs may occur.    Azithromycin Rash     12/1/22: Developed a rash that is not raised and looks diffuse in nature. It started in the groin and up the back and has now worked its way up her chest into her face. Pt states that it has now started to itch. She is breathing and talking normally and denies any airway changes. Unclear what started rash. Pt noted feeling somewhat itchy yesterday.    Ganciclovir Rash     12/1/22: Developed a rash that is not raised and looks diffuse in nature. It started in the groin and up the back and has now worked its way up her chest into her face. Pt states that it has now started to itch. She is breathing and talking normally and denies any airway changes. Unclear what started rash. Pt noted feeling somewhat itchy yesterday.       REVIEW OF SYSTEMS:  A comprehensive of systems was negative except as noted above.    SOCIAL HISTORY:   Social History     Socioeconomic History    Marital status: Single     Spouse name: Not on file    Number of children: Not on file    Years of education: Not on file    Highest education level: Not on file   Occupational History    Not on file   Tobacco Use    Smoking status: Former     Years: 30.00     Types: Cigarettes     Quit date: 11/11/2020      "Years since quittin.9    Smokeless tobacco: Never   Substance and Sexual Activity    Alcohol use: Not Currently    Drug use: Not Currently     Types: Marijuana, Methamphetamines     Comment: hx:marijuana and methamphetamine-quit both unsure ?  2-3 yrs ago    Sexual activity: Not on file   Other Topics Concern    Parent/sibling w/ CABG, MI or angioplasty before 65F 55M? Not Asked   Social History Narrative    Not on file     Social Determinants of Health     Financial Resource Strain: Not on file   Food Insecurity: Not on file   Transportation Needs: Not on file   Physical Activity: Not on file   Stress: Not on file   Social Connections: Not on file   Interpersonal Safety: Not At Risk (2023)    Humiliation, Afraid, Rape, and Kick questionnaire     Fear of Current or Ex-Partner: No     Emotionally Abused: No     Physically Abused: No     Sexually Abused: No   Housing Stability: Not on file         FAMILY MEDICAL HISTORY:   History reviewed. No pertinent family history.  Unknown Family history of kidney disease  Reviewed with patient     PHYSICAL EXAM:   Temp  Av.2  F (36.8  C)  Min: 97.3  F (36.3  C)  Max: 99.1  F (37.3  C)      Pulse  Av.1  Min: 70  Max: 98 Resp  Av.4  Min: 10  Max: 27  SpO2  Av %  Min: 87 %  Max: 99 %       /69 (BP Location: Right arm)   Pulse 76   Temp 97.5  F (36.4  C) (Oral)   Resp 16   Ht 1.575 m (5' 2\")   Wt 51.1 kg (112 lb 10.5 oz)   SpO2 93%   BMI 20.60 kg/m     Date 10/10/23 07 - 10/11/23 0659   Shift 3879-0216 3853-3655 2186-5775 24 Hour Total   INTAKE   Other 0   0   NG/GT 60   60   Shift Total(mL/kg) 60(1.17)   60(1.17)   OUTPUT   Other 2000   Shift Total(mL/kg) (39.14)   (39.14)   Weight (kg) 51.1 51.1 51.1 51.1      Admit Weight: 50.8 kg (111 lb 15.9 oz)     GENERAL APPEARANCE: no distress,  awake  EYES: no scleral icterus, pupils equal  Lymphatics: no cervical or supraclavicular LAD  Pulmonary: lungs clear to auscultation with " equal breath sounds bilaterally, no clubbing  CV: regular rhythm, normal rate, no rub   - JVD -ve   - Edema +  GI: soft, nontender, normal bowel sounds  MS: no evidence of inflammation in joints, no muscle tenderness  : no montgomery  SKIN: no rash, warm, dry, no cyanosis  NEURO: face symmetric, no asterixis     LABS:   I have reviewed the following labs:  CMP  Recent Labs   Lab 10/10/23  1547 10/10/23  0618 10/09/23  1302   NA  --  140  --    POTASSIUM  --  4.1  --    CHLORIDE  --  101  --    CO2  --  26  --    ANIONGAP  --  13  --    * 132* 102*   BUN  --  34.2*  --    CR  --  5.70*  --    GFRESTIMATED  --  8*  --    ESTUARDO  --  8.9  --      CBC  Recent Labs   Lab 10/10/23  0618   HGB 12.0   WBC 4.5   RBC 3.32*   HCT 38.9   *   MCH 36.1*   MCHC 30.8*   RDW 14.7   *     INRNo lab results found in last 7 days.  ABGNo lab results found in last 7 days.   URINE STUDIES  Recent Labs   Lab Test 05/18/23  0627 09/19/22  2254 08/01/22  0319 07/08/22  0831 07/05/22  1004   COLOR Yellow Yellow Light Yellow Yellow Yellow   APPEARANCE Slightly Cloudy* Slightly Cloudy* Clear Clear Slightly Cloudy*   URINEGLC Negative Negative Negative Negative Negative   URINEBILI Negative Negative Negative Negative Negative   URINEKETONE Negative Negative Negative Negative Trace*   SG 1.011 1.015 1.015 1.016 1.030   UBLD Negative Moderate* Negative Small* Small*   URINEPH 5.0 7.0 8.0* 5.5 5.5   PROTEIN 30* Negative Negative 30* 100*   NITRITE Negative Negative Negative Negative Negative   LEUKEST Moderate* Large* Negative Negative Negative   RBCU 3* 3*  --  <1 15*   WBCU 21* 29*  --  1 5     No lab results found.  PTH  Recent Labs   Lab Test 09/26/22  2359 09/22/22  0551   PTHI 46 30     IRON STUDIES  Recent Labs   Lab Test 09/26/22  0555 09/03/22  1039 08/24/22  0810 07/21/22  0122   IRON 54 21* 41 31*    233* 196* 285   IRONSAT 22 9* 21 11*   CARLOS 769* 343* 334* 189       IMAGING:  All imaging studies reviewed by me.      Sheela Saravia MD    I have seen and examined this patient with the fellow.  This note reflects our joint assessment and plan.     Alicia Carrillo MD

## 2023-10-11 LAB
HBV SURFACE AB SERPL IA-ACNC: 9.55 M[IU]/ML
HBV SURFACE AB SERPL IA-ACNC: NORMAL M[IU]/ML

## 2023-10-16 ENCOUNTER — PATIENT OUTREACH (OUTPATIENT)
Dept: GASTROENTEROLOGY | Facility: CLINIC | Age: 63
End: 2023-10-16
Payer: MEDICARE

## 2023-10-16 NOTE — TELEPHONE ENCOUNTER
MEDICATION APPEAL APPROVED    Medication: XIFAXAN 550 MG PO TABS  Authorization Effective Date: 10/10/2023 - until further notice  Approved Dose/Quantity: 42 tabs / 14 days  Formerly [facility] Key / Reference #: [id number]   Insurance: [employer] - Phone [phone] Fax [phone]    Expected CoPay: 4.30     CoPay Card Available: No    Filling Pharmacy: [facility] [place], [place] - [address] [place] [address]   Patient Notified: Yes    Claribel Sauer  [facility] Pharm[facility] Liaison  Ph: [phone] Pager: [phone]   Securely message with the Vocera Web Console (learn more here)

## 2023-10-16 NOTE — TELEPHONE ENCOUNTER
Started Rifaximin on Sunday, no improvement in diarrhea yet. Did have a nino and cheese omelet this morning, and is advanced her diet. No complaints at this time.    Follow up visit on 11/29 arranged. 3 month appointment on 1/24/24 at 9am on hold, will arrange for gastric emptying study at that time. Message routed to Dr Penny Vogel, RN, BSN,   [facility]  Care coordinator

## 2023-10-20 ENCOUNTER — TELEPHONE (OUTPATIENT)
Dept: TRANSPLANT | Facility: CLINIC | Age: 63
End: 2023-10-20
Payer: MEDICARE

## 2023-10-20 ENCOUNTER — LAB (OUTPATIENT)
Dept: LAB | Facility: CLINIC | Age: 63
End: 2023-10-20
Payer: MEDICARE

## 2023-10-20 DIAGNOSIS — Z94.2 S/P LUNG TRANSPLANT (H): ICD-10-CM

## 2023-10-20 DIAGNOSIS — Z94.2 LUNG REPLACED BY TRANSPLANT (H): Primary | ICD-10-CM

## 2023-10-20 PROCEDURE — 86832 HLA CLASS I HIGH DEFIN QUAL: CPT

## 2023-10-20 PROCEDURE — 86833 HLA CLASS II HIGH DEFIN QUAL: CPT

## 2023-10-20 PROCEDURE — 80197 ASSAY OF TACROLIMUS: CPT | Performed by: INTERNAL MEDICINE

## 2023-10-20 NOTE — TELEPHONE ENCOUNTER
DATE:  10/20/2023     TIME OF RECEIPT FROM LAB:  11:44 AM    ORDERING PROVIDER:     LAB TEST:  Creatinine    LAB VALUE:  7.25    RESULTS GIVEN WITH READ-BACK TO (PROVIDER):  Veronique Daniels RN    TIME LAB VALUE REPORTED TO PROVIDER:   11:53 AM

## 2023-10-21 LAB
TACROLIMUS BLD-MCNC: 10.9 UG/L (ref 5–15)
TME LAST DOSE: NORMAL H
TME LAST DOSE: NORMAL H

## 2023-10-23 ENCOUNTER — TELEPHONE (OUTPATIENT)
Dept: TRANSPLANT | Facility: CLINIC | Age: 63
End: 2023-10-23
Payer: MEDICARE

## 2023-10-23 DIAGNOSIS — Z94.2 S/P LUNG TRANSPLANT (H): ICD-10-CM

## 2023-10-23 NOTE — TELEPHONE ENCOUNTER
Calling about patients most recent tacrolimus level of 10.9 with goal 8-10. Current dosing is 0.4 ml BID. Will reduce to 0.4 ml in AM and 0.3 ml in PM. 7-10 days.     Patient states she will go get labs on Friday 10/27 this week. Has standing labs good until Nov 16, 2023

## 2023-10-27 ENCOUNTER — LAB (OUTPATIENT)
Dept: LAB | Facility: CLINIC | Age: 63
End: 2023-10-27
Payer: MEDICARE

## 2023-10-27 DIAGNOSIS — Z76.82 AWAITING ORGAN TRANSPLANT STATUS: Primary | ICD-10-CM

## 2023-10-27 PROCEDURE — 80197 ASSAY OF TACROLIMUS: CPT | Performed by: INTERNAL MEDICINE

## 2023-10-28 LAB
TACROLIMUS BLD-MCNC: 8.5 UG/L (ref 5–15)
TME LAST DOSE: NORMAL H
TME LAST DOSE: NORMAL H

## 2023-10-30 NOTE — RESULT ENCOUNTER NOTE
Tacrolimus level 8.5 at 12.25 hours, on 10/27/23.  Goal 8-10.   Current dose 0.4 mg in AM, 0.3 mg in PM    Level at goal.  No dose change.    OnHand message sent

## 2023-10-31 DIAGNOSIS — Z00.6 RESEARCH STUDY PATIENT: Primary | ICD-10-CM

## 2023-10-31 LAB
DONOR IDENTIFICATION: NORMAL
DQB2: 6866
DSA COMMENTS: NORMAL
DSA PRESENT: YES
DSA TEST METHOD: NORMAL
ORGAN: NORMAL
SA 1 CELL: NORMAL
SA 1 TEST METHOD: NORMAL
SA 2 CELL: NORMAL
SA 2 TEST METHOD: NORMAL
SA1 HI RISK ABY: NORMAL
SA1 MOD RISK ABY: NORMAL
SA2 HI RISK ABY: NORMAL
SA2 MOD RISK ABY: NORMAL
UNACCEPTABLE ANTIGENS: NORMAL
UNOS CPRA: 37
ZZZSA 1  COMMENTS: NORMAL
ZZZSA 2 COMMENTS: NORMAL

## 2023-11-02 ENCOUNTER — REFERRAL (OUTPATIENT)
Dept: TRANSPLANT | Facility: CLINIC | Age: 63
End: 2023-11-02

## 2023-11-02 ENCOUNTER — TRANSFERRED RECORDS (OUTPATIENT)
Dept: HEALTH INFORMATION MANAGEMENT | Facility: CLINIC | Age: 63
End: 2023-11-02
Payer: MEDICARE

## 2023-11-02 DIAGNOSIS — Z99.2 ESRD (END STAGE RENAL DISEASE) ON DIALYSIS (H): ICD-10-CM

## 2023-11-02 DIAGNOSIS — N18.6 ESRD (END STAGE RENAL DISEASE) (H): Primary | ICD-10-CM

## 2023-11-02 DIAGNOSIS — Z94.2 LUNG TRANSPLANT STATUS, BILATERAL (H): ICD-10-CM

## 2023-11-02 DIAGNOSIS — Z76.82 ORGAN TRANSPLANT CANDIDATE: ICD-10-CM

## 2023-11-02 DIAGNOSIS — N18.6 ESRD (END STAGE RENAL DISEASE) ON DIALYSIS (H): ICD-10-CM

## 2023-11-02 DIAGNOSIS — N18.6 END STAGE RENAL DISEASE (H): ICD-10-CM

## 2023-11-02 DIAGNOSIS — Z01.818 PRE-TRANSPLANT EVALUATION FOR KIDNEY TRANSPLANT: ICD-10-CM

## 2023-11-02 DIAGNOSIS — Z94.2 S/P LUNG TRANSPLANT (H): Chronic | ICD-10-CM

## 2023-11-02 NOTE — LETTER
11/7/23            Sofie Rodriguez  [address] [place] [address]          Dear Sofie,    Thank you for your interest in the Transplant Center at [facility]. We look forward to being a part of your care team and assisting you through the transplant process.    As we discussed, your transplant coordinator is Amy Cooney (Kidney).  You may call your coordinator at any time with questions or concerns.  Your first scheduled call will be on 11/17/23 between 8:00am and 12:00pm.  If this needs to change, call [phone].    Please complete the following.    Fill out and return the enclosed forms  Authorization for Electronic Communication  Authorization to Discuss Protected Health Information  Authorization for Release of Protected Health Information  Authorization for Care Everywhere Release of Information    Sign up for:  AMEEt, access to your electronic medical record (see enclosed pamphlet)  PenBladetransplantSoundtracker, a transplant education website    You can use these tools to learn more about your transplant, communicate with your care team, and track your medical details      Sincerely,      Solid Organ Transplant  [facility]    cc: Care Team

## 2023-11-03 DIAGNOSIS — Z94.2 S/P LUNG TRANSPLANT (H): Chronic | ICD-10-CM

## 2023-11-06 DIAGNOSIS — Z94.2 S/P LUNG TRANSPLANT (H): Chronic | ICD-10-CM

## 2023-11-07 ENCOUNTER — DOCUMENTATION ONLY (OUTPATIENT)
Dept: TRANSPLANT | Facility: CLINIC | Age: 63
End: 2023-11-07
Payer: MEDICARE

## 2023-11-07 VITALS — WEIGHT: 109 LBS | BODY MASS INDEX: 20.06 KG/M2 | HEIGHT: 62 IN

## 2023-11-07 NOTE — TELEPHONE ENCOUNTER
PCP: Bard Agustina MD   Referring Organization:   Referring Provider: Andres Fairbanks MD  Referring Diagnosis: unspecified complication of lung transplant    GFR/Date: 12 (10/30/23)    Is patient under the age of 65? yes  Is patient diabetic? yes  Is patient on insulin? no  Was patient offered a pancreas transplant referral? no    Is patient in a group home/assisted living? no  Does patient have a guardian? no    Referral intake process completed.  Patient is aware that after financial approval is received, medical records will be requested.   Patient confirmed for a callback from transplant coordinator on 11/17/23. (within 2 weeks)  Tentative evaluation date 1/31/24 slot 3 (within 4 weeks) if appointment is virtual, does patient have capabilities of setting this up? N/A    Confirmed coordinator will discuss evaluation process in more detail at the time of their call.   Patient is aware of the need to arrange age appropriate cancer screening, vaccinations, and dental care.  Reminded patient to complete questionnaire, complete medical records release, and review packet prior to evaluation visit .  Assessed patient for special needs (ie-wheelchair, assistance, guardian, and ):  None needed.  Patient instructed to call [phone] with questions.     Patient gave verbal consent during intake call to obtain medical records and documents outside of [facility]:  Yes

## 2023-11-10 ENCOUNTER — TELEPHONE (OUTPATIENT)
Dept: INTERVENTIONAL RADIOLOGY/VASCULAR | Facility: CLINIC | Age: 63
End: 2023-11-10
Payer: MEDICARE

## 2023-11-14 ENCOUNTER — HOSPITAL ENCOUNTER (OUTPATIENT)
Dept: INTERVENTIONAL RADIOLOGY/VASCULAR | Facility: CLINIC | Age: 63
Discharge: HOME | End: 2023-11-14
Attending: NURSE PRACTITIONER | Admitting: RADIOLOGY
Payer: MEDICARE

## 2023-11-14 VITALS
RESPIRATION RATE: 18 BRPM | DIASTOLIC BLOOD PRESSURE: 76 MMHG | SYSTOLIC BLOOD PRESSURE: 149 MMHG | OXYGEN SATURATION: 98 % | TEMPERATURE: 98.3 F | HEART RATE: 79 BPM

## 2023-11-14 DIAGNOSIS — R63.30 FEEDING DIFFICULTIES: ICD-10-CM

## 2023-11-14 PROCEDURE — C1769 GUIDE WIRE: HCPCS

## 2023-11-14 PROCEDURE — 49452 REPLACE G-J TUBE PERC: CPT

## 2023-11-14 NOTE — IR NOTE
Patient Name: Sofie Rodriguez  Medical Record Number: [id number]  Today's Date: 11/14/2023    Procedure: GJ exchange  Proceduralist: Dr. Ramon      Sedation medications administered: none    Other Notes: Pt arrived to IR room 1 from Pre/post bay 2. Consent reviewed. Pt denies any questions or concerns regarding procedure. Pt positioned supine and monitored per protocol. Successful exchange of GJ tube.  Pt tolerated procedure without any noted complications. Pt transferred back to Pre/post bay 2.    Discharge instructions reviewed with patient; AVS provided.  Patient discharged to [facility] and awaiting [facility] transport.

## 2023-11-14 NOTE — PROCEDURES
[facility]    Procedure: Gastrojejunostomy tube exchange    Date/Time: 11/14/2023 2:19 PM    Performed by: Natanael Ramon MD  Authorized by: Natanael Ramon MD      UNIVERSAL PROTOCOL   Site Marked: NA  Prior Images Obtained and Reviewed:  Yes  Required items: Required blood products, implants, devices and special equipment available    Patient identity confirmed:  Verbally with patient, arm band, provided demographic data and hospital-assigned identification number  Patient was reevaluated immediately before administering moderate or deep sedation or anesthesia  Confirmation Checklist:  Patient's identity using two indicators, relevant allergies, procedure was appropriate and matched the consent or emergent situation and correct equipment/implants were available  Time out: Immediately prior to the procedure a time out was called    Universal Protocol: the Joint Commission Universal Protocol was followed    Preparation: Patient was prepped and draped in usual sterile fashion       ANESTHESIA    Anesthesia:  Local infiltration  Local Anesthetic:  Lidocaine 1% without epinephrine      SEDATION    Patient Sedated: No    See dictated procedure note for full details.  Findings: Successful gastrojejunostomy tube exchange.    Specimens: none    Complications: None    Condition: Stable      PROCEDURE    Patient Tolerance:  Patient tolerated the procedure well with no immediate complications  Length of time physician/provider present for 1:1 monitoring during sedation: 0    Clifton Ramon MD  Interventional Radiology

## 2023-11-14 NOTE — DISCHARGE INSTRUCTIONS
Gastrojejunostomy (G/J) Tube Exchange Discharge Instructions:   You had a Gastrojejunostomy Tube (stomach and jejunum) exchanged on 11/14/2023.   This tube is often used for nutritional support and medication administration or it can be used for stomach venting.     Care instructions:  - If you received sedation for your procedure, do not drive or operate heavy machinery for the rest of the day.  - Avoid soaking in stagnant water (tub baths, Jacuzzis, pools, lake, or ocean).   - You may shower beginning the day after the tube was exchanged.   - Clean under the disc daily with soap and water. Pat dry under disc and apply new split gauze dressing under disc. Cleaning tube site daily will help prevent infection and skin irritation.  - A small amount of clear tan drainage from the tube exit site can be normal.  - Make sure the disc on the tube fits slightly snug against the skin so that the tube does not move in or out of the body easily.  - Flush your tube with 60cc of water twice a day (using a cath tip syringe) to prevent tube from clogging (or follow recommendations from your doctor or dietician if given).    Giving Feedings and Medications:  - Follow-up with your dietician, primary care provider, or oncologist for instructions on tube feedings and medication administration.    - ONLY use specific enteric feedings with your tube (unless otherwise discussed with your dietitian).    - Flush tube at least twice daily with 60ml of water using cath tip syringe unless otherwise instructed by your doctor or dietician.  - Flush the tube before and after administrating medications and bolus feedings with 60cc of water.    Follow Up:  -Recommend routine 3 month exchanges of feeding tube. Please contact your primary care provider or speak with your dietitian to obtain an order to have your G/GJ tube exchanged. Then contact [facility]'s Medical Imaging scheduling department at [phone] to schedule your G/GJ tube  exchange appointment.    Please seek medical evaluation for:  - Fever (greater than 101 F (38.3C).  - Purulent (yellow/green/foul smelling) drainage from tube exit site.   - Significant or worsening abdominal pain.   - Skin that is hot to the touch or significantly reddened at the tube exit site.   - Bleeding at tube exit site.    Call [facility] RN Line at [phone] with questions or if you have any of the following symptoms:  - Tube falls out or felt to be out of position.  - Unable to flush tube.  - Significant leakage around tube site (tube feeding, medications or drainage).  - Significant bleeding at the tube exit site.  - Severe pain at tube exit site.

## 2023-11-15 ENCOUNTER — LAB (OUTPATIENT)
Dept: LAB | Facility: CLINIC | Age: 63
End: 2023-11-15
Payer: MEDICARE

## 2023-11-15 DIAGNOSIS — Z94.2 LUNG REPLACED BY TRANSPLANT (H): Primary | ICD-10-CM

## 2023-11-15 PROCEDURE — 80197 ASSAY OF TACROLIMUS: CPT | Performed by: INTERNAL MEDICINE

## 2023-11-16 LAB
TACROLIMUS BLD-MCNC: 8.1 UG/L (ref 5–15)
TME LAST DOSE: NORMAL H
TME LAST DOSE: NORMAL H

## 2023-11-17 ENCOUNTER — TELEPHONE (OUTPATIENT)
Dept: TRANSPLANT | Facility: CLINIC | Age: 63
End: 2023-11-17

## 2023-11-17 NOTE — TELEPHONE ENCOUNTER
Spoke with patient today. Pt stating wants to schedule her pre kidney transplant eval on [facility] 01/31/2024, explained I will let the schedulers know to confirm this in her chart and then she will be able to see her schedule in her My Chart. Instructed pt to talk with Kaylin CAMARILLO at her appt 12/06/2023 to provide a recommendation from lung tx point of view to proceed with kidney transplant. Reviewed the appts for this day: tx surg, tx neph, tx SW, tx nutritionist, EKG, echo and labs. Instructed pt to bring 1-2 people with them to eval and to eat and drink and normally on eval day. Instructed pt they will receive an email from Caterina in our Office prior to eval with a Receipt of Info and educational instructed to sign consent/ read materials. Explained I will send the link to the videos she needs to watch in my letter. Instructed on the use of www.unos.org and www.srtr.org.  Pt expressed excellent understanding of all and was in good agreement with the plan.     Smart set orders into [facility] today for pre kidney alone eval on 01/31/2024 slot C.

## 2023-11-17 NOTE — TELEPHONE ENCOUNTER
Called patient today and reached her VM. LM I am calling for our scheduled call about proceeding with a kidney transplant eval on 01/31/2023. Added that we will need Kaylin CAMARILLO on 12/06/2023 to agree that now is a good time to proceed with kidney eval. Asked pt to return my call to discuss pre kidney eval timing.

## 2023-11-17 NOTE — TELEPHONE ENCOUNTER
Patient Call: General  Route to LPN    Reason for call: Sofie was due to have a phone call with Coordinator.between 8am-Noon wondering if this is still happening.    Call back needed? Yes    Return Call Needed  Same as documented in contacts section  When to return call?: Same day: Route High Priority

## 2023-11-29 ENCOUNTER — VIRTUAL VISIT (OUTPATIENT)
Dept: GASTROENTEROLOGY | Facility: CLINIC | Age: 63
End: 2023-11-29
Attending: INTERNAL MEDICINE
Payer: MEDICARE

## 2023-11-29 VITALS — HEIGHT: 62 IN | WEIGHT: 108 LBS | BODY MASS INDEX: 19.88 KG/M2

## 2023-11-29 DIAGNOSIS — K63.8219 SMALL INTESTINAL BACTERIAL OVERGROWTH (SIBO): ICD-10-CM

## 2023-11-29 DIAGNOSIS — K90.9 DIARRHEA DUE TO MALABSORPTION: ICD-10-CM

## 2023-11-29 DIAGNOSIS — R19.7 DIARRHEA DUE TO MALABSORPTION: ICD-10-CM

## 2023-11-29 DIAGNOSIS — K31.84 GASTROPARESIS: Primary | ICD-10-CM

## 2023-11-29 PROCEDURE — 99214 OFFICE O/P EST MOD 30 MIN: CPT | Mod: 24 | Performed by: INTERNAL MEDICINE

## 2023-11-29 ASSESSMENT — PAIN SCALES - GENERAL: PAINLEVEL: NO PAIN (0)

## 2023-11-29 NOTE — LETTER
11/29/2023         RE: Sofie Rodriguez  [address] [place] [address]        Dear Colleague,    Thank you for referring your patient, Sofie Rodriguez, to the [facility]. Please see a copy of my visit note below.    INTERVENTIONAL ENDOSCOPY OUTPATIENT CLINIC CONSULT  DATE OF SERVICE: 11/29/23   PROVIDER REQUESTING CONSULT: Kaylin Triana PA-C  Reason for Consultation: gastroparesis     ASSESSMENT:  Sofie is a 63 year old female with a PMHx of end stage COPD s/p bilateral lung transplant on 6/28/22 complicated by acute limb ischemia of LUE s/p left radial thrombectomy, h/o C. Diff, h/o EBV viremia, ESRD on dialysis, hemobilia s/p ERCP presumably due to hemorrhage into gallbladder, initially referred for gastroparesis s/p PEGJ placement and chronic diarrhea with +SIBO testing. S/p G-POEM on 10/9/23 here for follow up.    #Gastroparesis  Overall she states not feeling that much different after her G-POEM. Although her self reported GCSI went from 21 to 13 today. May ultimately be a nonresponder. Will repeat gastric emptying study at the 3 month [facility]ington and follow up.      #Chronic Osmotic Diarrhea  #Gas/Bloating  #J-tube feeding intolerance  #Weight loss  Her inpatient work up was consistent with an osmotic diarrhea (negative colonoscopy and infectious work up). This would suggest a malabsorptive process and with the associated gas/bloating symptoms and small bowel dysmotility suggested by J-tube feeding intolerance. SIBO testing was positive. Ultimately completed a course of Rifaximin 550 mg TID x 2 weeks. She notes slight improvement after this but not dramatic. She continues on loperamide to 8x a day. I instructed her to try taking oral supplemental fiber such as metamucil. Fiber could also be added to her tube feeds.    RECOMMENDATIONS:  - Repeat gastric emptying and clinic follow up in ~2 months    Gonzalez Navarro MD  [facility]  Division of  Gastroenterology and Hepatology  [facility] [address], [place] [address]    ________________________________________________________________  HPI:  Sofie is a 63 year old female with a PMHx of end stage COPD s/p bilateral lung transplant on 6/28/22 complicated by acute limb ischemia of LUE s/p left radial thrombectomy, h/o C. Diff, h/o EBV viremia, ESRD on dialysis, hemobilia s/p ERCP presumably due to hemorrhage into gallbladder, initially referred for gastroparesis s/p PEGJ placement but but also had chronic diarrhea with +SIBO testing.     PRIOR History:  She essentially developed gastroparesis following her lung transplant and underwent PEGJ placement by IR on 7/27/22. But also of note is development of a pyloric channel ulcer seen on an EGD on 8/11/22. She's been on BID PPI since then. Most recent gastric emptying study on 1/2/2023 shows persistent delayed emptying of 75% at 240 min. She continues on J-tube feeds but does eat sometimes for comfort. Eating can make her symptomatic with nausea, bloating/gas. She will periodically vent her G-tube about once a week, sometimes more frequently if she's been eating, when she has symptoms of bloating/gas and distension which helps. She has never been on Reglan.     She underwent EGD with pyloric Botox injection pm 1/25/23. She noticed an improvement following the procedure although still some residual symptoms. She thinks she is eating more by mouth now and venting less. She continues on her J-tube feeds. Certainly less nausea and bloating.    1/25/23 GCSI= 3,0,0,3,2,2,3,4,4=21   2/24/23 GCSI= 1,0,0,3,1,1,2,2,2=12    Interval History:  She underwent G-POEM procedure on 10/9/23. Procedure was uneventful. She is here for 1 month follow up. She reports not feeling all that much improved with similar symptoms of early satiety. No vomiting. She continues on J-tube feeds but is down to 1 carton a day. Weight stable. She was started on Rifaximin for a  course after his hospital discharge. She thinks maybe there has been a slight improvement in her diarrhea. She now has ~3 loose stools a day.     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    Hepatitis 2017    Hep C, [facility]    History of blood transfusion     HTN (hypertension)     Infectious mononucleosis     Lung infection 11/30/2022    Osteopenia      Patient Active Problem List   Diagnosis    COPD (chronic obstructive pulmonary disease) (H)    Abnormal findings on diagnostic imaging of cardiovascular system    Status post coronary angiogram    Hepatitis C, chronic (H)    S/P lung transplant (H)    Acute post-operative pain    Immunosuppressed status (H24)    Acute kidney failure with tubular necrosis (H24)    Thrombus of left radial artery (H)    Diaphragm dysfunction    Paroxysmal A-fib (H)    Administration of long-term prophylactic antibiotics    EBV (Vibha-Barr virus) viremia    Acute pylorus ulcer    Hypogammaglobulinemia (H24)    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 difficile colitis    Leukopenia, unspecified type    Clinical diagnosis of COVID-19       PSurgHx:  Past Surgical History:   Procedure Laterality Date    BRONCHOSCOPY (RIGID OR FLEXIBLE), DIAGNOSTIC N/A  08/02/2022    Procedure: BRONCHOSCOPY, DIAGNOSTIC- inspection Bronch;  Surgeon: Kamala Lovell MD;  [facility]    BRONCHOSCOPY (RIGID OR FLEXIBLE), DIAGNOSTIC N/A 09/13/2022    Procedure: INSPECTION BRONCHOSCOPY, WITH BRONCHOALVEOLAR LAVAGE;  Surgeon: Jose R Mccullough MD;  [facility]    BRONCHOSCOPY (RIGID OR FLEXIBLE), DIAGNOSTIC N/A 11/09/2022    Procedure: BRONCHOSCOPY, WITH BRONCHOALVEOLAR LAVAGE AND BIOPSY;  Surgeon: Cesar Lima MD;  [facility]    BRONCHOSCOPY (RIGID OR FLEXIBLE), DIAGNOSTIC N/A 01/25/2023    Procedure: BRONCHOSCOPY, WITH BRONCHOALVEOLAR LAVAGE AND BIOPSY;  Surgeon: Mason Reddy MD;  [facility]    BRONCHOSCOPY (RIGID OR FLEXIBLE), DIAGNOSTIC N/A 04/19/2023    Procedure: BRONCHOSCOPY, WITH BRONCHOALVEOLAR LAVAGE AND BIOPSY;  Surgeon: Kamala Lovell MD;  [facility]    BRONCHOSCOPY (RIGID OR FLEXIBLE), DIAGNOSTIC N/A 07/12/2023    Procedure: BRONCHOSCOPY, WITH BRONCHOALVEOLAR LAVAGE AND BIOPSY;  Surgeon: Cesar Lima MD;  [facility]    BRONCHOSCOPY FLEXIBLE AND RIGID N/A 07/19/2022    Procedure: BRONCHOSCOPY inspection only;  Surgeon: Bob Liao MD;  [facility]    COLONOSCOPY  2015    CORONARY ANGIOGRAPHY ADULT ORDER      CV CORONARY ANGIOGRAM N/A 06/30/2021    Procedure: CV CORONARY ANGIOGRAM;  Surgeon: Alexander Cuellar MD;  Location:  [facility]ARD[facility] CATH LAB    CV RIGHT HEART CATH MEASUREMENTS RECORDED N/A 06/30/2021    Procedure: CV RIGHT HEART CATH;  Surgeon: Alexander Cuellar MD;  Location:  HE[facility]ARDIAC CATH LAB    ENDOSCOPIC PERORAL MYOTOMY N/A 10/09/2023    Procedure: MYOTOMY, ESOPHAGUS, ENDOSCOPIC, ORAL APPROACH;  Surgeon: Gonzalez Navarro MD;  [facility]    ENDOSCOPIC RETROGRADE CHOLANGIOPANCREATOGRAM N/A 08/11/2022    Procedure: ENDOSCOPIC RETROGRADE CHOLANGIOPANCREATOGRAPHY WITH PANCREATIC DUCT NEEDLE KNIFE AND STENT PLACEMENT, BILE DUCT SPHINCTEROTOMY, BLOOD/DEBRIS REMOVAL AND STENT PLACEMENT;  Surgeon:  Cosmo Arroyo MD;  [facility]    ENDOSCOPIC RETROGRADE CHOLANGIOPANCREATOGRAM N/A 10/07/2022    Procedure: ENDOSCOPIC RETROGRADE CHOLANGIOPANCREATOGRAPHY with biliary and pancreatic stent removal, debris removal;  Surgeon: Cosmo Arroyo MD;  [facility]    ENT SURGERY  1974    tonsillectomy    ENTEROSCOPY SMALL BOWEL N/A 08/11/2022    Procedure: SMALL BOWEL ENTEROSCOPY;  Surgeon: Cosmo Arroyo MD;  [facility]    ESOPHAGOGASTRODUODENOSCOPY, WITH NASOGASTRIC TUBE INSERTION N/A 07/01/2022    Procedure: ESOPHAGOGASTRODUODENOSCOPY, WITH NASOJEJUNAL TUBE INSERTION;  Surgeon: Ozzy Nickerson MD;  [facility]    ESOPHAGOSCOPY, GASTROSCOPY, DUODENOSCOPY (EGD), COMBINED N/A 08/03/2022    Procedure: ESOPHAGOGASTRODUODENOSCOPY (EGD);  Surgeon: Ira Andres MD;  [facility]    ESOPHAGOSCOPY, GASTROSCOPY, DUODENOSCOPY (EGD), COMBINED N/A 01/25/2023    Procedure: ESOPHAGOGASTRODUODENOSCOPY (EGD) with botox injection;  Surgeon: Gonzalez Navarro MD;  [facility]    ESOPHAGOSCOPY, GASTROSCOPY, DUODENOSCOPY (EGD), COMBINED N/A 10/09/2023    Procedure: ESOPHAGOGASTRODUODENOSCOPY;  Surgeon: Gonzalez Navarro MD;  [facility]    HAND SURGERY      INSERT CHEST TUBE Right 09/13/2022    Procedure: Insert chest tube;  Surgeon: Jose R Mccullough MD;  [facility]    IR CVC TUNNEL PLACEMENT > 5 YRS OF AGE  09/26/2022    IR GASTRO JEJUNOSTOMY TUBE CHANGE  08/31/2022    IR GASTRO JEJUNOSTOMY TUBE CHANGE  12/21/2022    IR GASTRO JEJUNOSTOMY TUBE CHANGE  07/12/2023    IR GASTRO JEJUNOSTOMY TUBE CHANGE  08/18/2023    IR GASTRO JEJUNOSTOMY TUBE CHANGE  11/14/2023    IR GASTRO JEJUNOSTOMY TUBE PLACEMENT  07/27/2022    IR THORACENTESIS  08/29/2022    LEEP TX, CERVICAL  04/07/2017    HECTOR III    LYMPH NODE BIOPSY Left 2005    Left axilla, benign- [place]    MIDLINE INSERTION - DOUBLE LUMEN Left 07/28/2022    20cm, Basilic vein    REPLACE GASTROJEJUNOSTOMY TUBE, PERCUTANEOUS   10/07/2022    Procedure: Replace Gastrojejunostomy Tube;  Surgeon: Cosmo Arroyo MD;  [facility]    THORACENTESIS Left 08/29/2022    Procedure: THORACENTESIS;  Surgeon: Bo Capone PA-C;  Location: [facility]    THORACENTESIS Left 09/13/2022    Procedure: Thoracentesis;  Surgeon: Jose R Mccullough MD;  [facility]    THROMBECTOMY UPPER EXTREMITY Left 07/02/2022    Procedure: LEFT RADIAL ARM THROMBECTOMY;  Surgeon: Christie Graham MD;  [facility]    TRANSPLANT LUNG RECIPIENT SINGLE X2 Bilateral 06/28/2022    Procedure: Clamshell Incision, Bilateral Sequential Lung Transplant, On Cardiopulmonary Bypass, Flexible Bronchoscopy;  Surgeon: Sue Sunshine MD;  [facility]       MEDS:  Current Outpatient Medications   Medication    metoprolol tartrate (LOPRESSOR) 50 MG tablet    alcohol swab prep pads    azaTHIOprine (IMURAN) 5 mg/mL SUSP    B Complex-C-Folic Acid (DIALYVITE) TABS    blood glucose (CONTOUR NEXT TEST) test strip    blood glucose (NO BRAND SPECIFIED) lancets standard    blood glucose monitoring (CONTOUR NEXT MONITOR W/DEVICE KIT) meter device kit    Calcium Carbonate-Vitamin D 600-10 MG-MCG TABS    dapsone 2 mg/mL SUSP    dicyclomine (BENTYL) 10 MG/5ML solution    loperamide (IMODIUM A-D) 2 MG tablet    multivitamin (CENTRUM SILVER) tablet    pantoprazole (PROTONIX) 2 mg/mL SUSP suspension    predniSONE (DELTASONE) 5 MG tablet    protein modular (PROSOURCE TF) LIQD    sevelamer carbonate, RENVELA, 0.8 GM PACK Packet    Sharps Container MISC    tacrolimus (GENERIC) 1 mg/mL suspension    vitamin B-12 (CYANOCOBALAMIN) 500 MCG tablet     No current facility-administered medications for this visit.     ALLERGIES:    Allergies   Allergen Reactions    Blood Transfusion Related (Informational Only) Other (See Comments)     Patient has a history of a clinically significant antibody against RBC antigens.  A delay in compatible RBCs may occur.     No Clinical Screening - See  Comments Other (See Comments)     Patient has a history of a clinically significant antibody against RBC antigens.  A delay in compatible RBCs may occur.    Azithromycin Rash     12/1/22: Developed a rash that is not raised and looks diffuse in nature. It started in the groin and up the back and has now worked its way up her chest into her face. Pt states that it has now started to itch. She is breathing and talking normally and denies any airway changes. Unclear what started rash. Pt noted feeling somewhat itchy yesterday.    Ganciclovir Rash     12/1/22: Developed a rash that is not raised and looks diffuse in nature. It started in the groin and up the back and has now worked its way up her chest into her face. Pt states that it has now started to itch. She is breathing and talking normally and denies any airway changes. Unclear what started rash. Pt noted feeling somewhat itchy yesterday.     FHx: Noncontributory    SOCIAL Hx:  Social History     Socioeconomic History    Marital status:      Spouse name: Not on file    Number of children: Not on file    Years of education: Not on file    Highest education level: Not on file   Occupational History    Not on file   Tobacco Use    Smoking status: Former     Packs/day: 0.50     Years: 30.00     Additional pack years: 0.00     Total pack years: 15.00     Types: Cigarettes     Quit date: 11/11/2020     Years since quitting: 3.0    Smokeless tobacco: Never   Substance and Sexual Activity    Alcohol use: Not Currently     Comment: Stopped drinking in 2020    Drug use: Not Currently     Types: Marijuana, Methamphetamines     Comment: hx:marijuana and methamphetamine-quit both unsure ?  2-3 yrs ago    Sexual activity: Not on file   Other Topics Concern    Parent/sibling w/ CABG, MI or angioplasty before 65F 55M? Not Asked   Social History Narrative    Not on file     Social Determinants of Health     Financial Resource Strain: Not on file   Food Insecurity: Not on  "file   Transportation Needs: Not on file   Physical Activity: Not on file   Stress: Not on file   Social Connections: Not on file   Interpersonal Safety: Not At Risk (9/1/2023)    Humiliation, Afraid, Rape, and Kick questionnaire     Fear of Current or Ex-Partner: No     Emotionally Abused: No     Physically Abused: No     Sexually Abused: No   Housing Stability: Not on file       ROS: A comprehensive Review of Systems was asked and answered in the negative unless specifically commented upon in the HPI    Physical Exam  Ht 1.575 m (5' 2\")   Wt 49 kg (108 lb)   BMI 19.75 kg/m    Body mass index is 19.75 kg/m .  Gen: A&Ox3, NAD  HEENT: Moist mucus membranes, no scleral icterus.  Lungs: no respiratory distress      LABS:  External Order Results on 12/30/2022   Component Date Value Ref Range Status    Sodium (External) 12/30/2022 137  136 - 146 mmol/L Final    Potassium (External) 12/30/2022 4.1  3.5 - 5.1 mmol/L Final    Chloride (External) (External) 12/30/2022 97 (L)  98 - 107 mmol/L Final    CO2 (External) 12/30/2022 27  22 - 29 mmol/L Final    Anion Gap (External) 12/30/2022 17.1  10.0 - 20.0 mmol/L Final    Creatinine (External) 12/30/2022 4.34  mg/dL Final    Urea Nitrogen (External) 12/30/2022 43.6 (H)  10.0 - 20.0 mg/dL Final    BUN/Creatinine Ratio (External) 12/30/2022 10.05 (L)  11.70 - 22.90 ratio Final    Calcium (External) 12/30/2022 9.5  8.6 - 10.5 mg/dL Final    Glucose (External) 12/30/2022 102 (H)  70 - 100 mg/dL Final    GFR Estimated (External) 12/30/2022 11 (L)  >=60 ml/min/1.73m2 Final    Magnesium (External) 12/30/2022 2.5  1.8 - 2.6 mg/dL Final    WBC Count (External) 12/30/2022 4.7  10(3)/uL Final    RBC Count (External) 12/30/2022 2.81  10(6)/uL Final    Hemoglobin (External) 12/30/2022 10.3  g/dL Final    Hematocrit (External) 12/30/2022 31.1  % Final    MCV (External) 12/30/2022 110.7  fL Final    MCH (External) 12/30/2022 36.5  pg Final    MCHC (External) 12/30/2022 33.0  32.0 - 36.0 g/dL " Final    RDW (External) 12/30/2022 16.2  % Final    Platelet Count (External) 12/30/2022 251  179 - 450 10(3)/uL Final         IMAGING:  EXAM:  NM GASTRIC EMPTYING, 1/2/2023 1:21 PM  HISTORY: Gastroparesis; Lung replaced by transplant (H)     TECHNIQUE: The patient ingested 2.1 mCi of Tc-99m sulfur colloid in 2  eggs, 2 pieces of toast w/ jelly. Static images were acquired at  approximately 1 hour intervals out through 4 hours using a dual head  gamma camera in an anterior and posterior position. The calculation of  emptying was based on dual head imaging with geometric mean  calculations.  A Linear square fit was calculated to the emptying  curve to determine the amount of residual activity at each time point.     FINDINGS:   Percent retention at 60 min = 87%.  Percent retention at 120 min = 77%.  Percent retention at 180 min = 77%.  Percent retention at 240 min = 75%.     T 1/2 emptying time =  238 mins.                                                                      IMPRESSION: Delayed gastric emptying    EXAM:  NM GASTRIC EMPTYING, 9/30/2022 12:32 PM     HISTORY: severe delayed gastric emptying for follow up; is off  dialysis. NPO weds night     TECHNIQUE: The patient ingested 2.3 mCi of Tc-99m sulfur colloid in in  2 eggs, 1 slices of toast with jelly, and a glass of water. Static  images were acquired at approximately 1 hour intervals out through 4  hours using a dual head gamma camera in an anterior and posterior  position. The calculation of emptying was based on dual head imaging  with geometric mean calculations.  A Linear square fit was calculated  to the emptying curve to determine the amount of residual activity at  each time point.     FINDINGS:   Percent retention at 60 min = 97%.  Percent retention at 120 min = 95%.  Percent retention at 180 min = 91%.  Percent retention at 240 min = 91%.     T 1/2 emptying time =  Too long to calculate.                                                                       IMPRESSION: Severe delayed gastric emptying    Endoscopies:  Upper GI Endoscopy 10/09/2023 11:50 AM [facility]   [address] [place] [address] [phone]     Endoscopy Department  _______________________________________________________________________________  Patient Name: Sofie Rodriguez            Procedure Date: 10/9/2023 11:50 AM  MRN: [id number]                       Account Number: [id number]  YOB: 1960               Admit Type: Outpatient  Age: 63                               Room:  OR   Gender: Female                        Note Status: Supervisor Override  Attending MD: OSKAR PÉREZ MD,      Pause for the Cause: time out performed  Total Sedation Time:                    _______________________________________________________________________________     Procedure:             Upper GI endoscopy  Indications:           For therapy of gastroparesis; 63 year old female with                         a PMHx of 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 Diff, h/o EBV                         viremia, ESRD on dialysis, hemobilia s/p ERCP                         presumably due to hemorrhage into gallbladder,                         gastroparesis s/p PEGJ and diarrhea related to SIBO.                         Responded previously to pyloric botox. Patient here                         for G-POEM. GCSI= 3,0,0,3,2,2,3,4,4=21  Providers:             OSKAR PÉREZ MD  Referring MD:            Requesting Provider:   MICHELE SOSA MD  Medicines:             General Anesthesia, IV Zosyn 3.375g  Complications:         No immediate complications. Estimated blood loss:                         Minimal.  _______________________________________________________________________________  Procedure:              Pre-Anesthesia Assessment:                         - Prior to the procedure, a History and Physical was                         performed, and patient medications and allergies were                         reviewed. The patient is competent. The risks and                         benefits of the procedure and the sedation options and                         risks were discussed with the patient. All questions                         were answered and informed consent was obtained.                         Patient identification and proposed procedure were                         verified by the physician, the nurse, the                         anesthesiologist and the anesthetist in the procedure                         room. Mental Status Examination: alert and oriented.                         Airway Examination: normal oropharyngeal airway and                         neck mobility. Respiratory Examination: clear to                         auscultation. CV Examination: normal. Prophylactic                         Antibiotics: The patient requires prophylactic                         antibiotics peroral endoscopic myotomy. Prior                         Anticoagulants: The patient has taken no anticoagulant                         or antiplatelet agents. ASA Grade Assessment: III - A                         patient with severe systemic disease. After reviewing                         the risks and benefits, the patient was deemed in                         satisfactory condition to undergo the procedure. The                         anesthesia plan was to use general anesthesia.                         Immediately prior to administration of medications,                         the patient was re-assessed for adequacy to receive                         sedatives. The heart rate, respiratory rate, oxygen                         saturations, blood pressure, adequacy of pulmonary                         ventilation, and  response to care were monitored                         throughout the procedure. The physical status of the                         patient was re-assessed after the procedure.                         After obtaining informed consent, the endoscope was                         passed under direct vision. Throughout the procedure,                         the patient's blood pressure, pulse, and oxygen                         saturations were monitored continuously. The Endoscope                         was introduced through the mouth, and advanced to the                         second part of duodenum. The was introduced through                         the mouth, and advanced to the. After obtaining                         informed consent, the endoscope was passed under                         direct vision. Throughout the procedure, the patient's                         blood pressure, pulse, and oxygen saturations were                         monitored continuously.The endoscopic myotomy was                         accomplished without difficulty. The patient tolerated                         the procedure well.                                                                                  Findings:       The esophagus was normal.       There was evidence of an intact gastrostomy with a patent GJ-tube       present in the gastric body extending into the duodneum. This was       characterized by healthy appearing mucosa. Small amount of retained food       and fluid in the stomach.       The examined duodenum was normal.       Localized moderate mucosal changes characterized by nodularity were       found at the pylorus at the 11 o'clock position. Likely hyperplastic       from GJ tube.       Preparations were made for gastric peroral endoscopic myotomy (G-POEM).       The stomach was cleaned and irrigated using saline and suctioned.       Mucosotomy followed by myotomy were performed in a greater curvature        orientation. First, a submucosal injection of a solution of methylene       blue and saline was used to lift the mucosa at the site of the initial       mucosotomy. The initial mucosal incision was made transversely at       approximately 4.0 cm proximal to the pylorus using a HybridKnife I-Type.       Next, the endoscope with a clear cap was used to enter into the       submucosal tunnel. The submucosal tunnel was then further created by       dissection of the submucosal fibers distally to the pyloric ring. A full       thickness myotomy (in a greater curvature orientation) was then       performed beginning at the pylorus using an ITknife2. The myotomy was       extended to 3.0 cm proximal to the pylorus. Intra procedure bleeding was       mild. A small Coagrasper was used effectively for hemostasis. After       completion of the myotomy, examination of the stomach and duodenum       showed no inadvertent mucosotomy. There was no evidence of bleeding       noted on the inspection of the myotomy edges and submucosal tunnel. The       myotomy was successfully performed. The mucosal entrance to the       submucosal tunnel was closed using endoscopic suturing. After G-POEM and       endoscope removal, the patient was examined. The patient's abdomen was       nondistended. Pylorus lumen open without resistance to passage.                                                                                   Impression:            - Normal esophagus.                         - GJ tube in place and not manipulated                         - Normal examined duodenum.                         - Nodularity was found at the pylorus at the 11                         o'clock position. Likely hyperplastic from GJ tube.                         - Gastric peroral endoscopic myotomy (G-POEM) was                         performed along greater curvature as described above.                         Mucosotomy closed with endoscopic  suturing.                         - No specimens collected.  Recommendation:        - Monitor in PACU and admit to medicine for                         observation.                         - NPO overnight. Leave G-tube to gravity and can start                         J-tube feeds tonight as tolerated                         - Started pantoprazole 40 mg IV BID.                         - Upon discharge, prescribe pantoprazole 40 mg PO BID                         x1-month.                         - Continue Zosyn IV while inpatient.                         - Patient had a positive breath test for SIBO but                         insurance denied Rifaximin 550 mg TID x 2 weeks.                         Consider attempting re-prescribing on discharge as did                         not respond to course of cipro. Does not need                         additional antibiotics related to procedure.                         - No anticoagulation or antiplatelets for 3-days.                         - Ordered head of bed elevation to 30-45 degrees.                         - Ordered incentive spirometry.                         - Order analgesia IV & antiemetics IV PRN.                         - Ordered CBC & BMP tomorrow AM                         - Ordered Upper GI series tomorrow AM to rule-out                         post-POEM gastric leak. Pneumoperitoneum expected.                         - If no gastric leak, and otherwise doing clinically                         well discharge home tomorrow and start full liquid diet                         - Liquid diet for three days starting tomorrow. Soft                         diet on day 4, 5,6. Regular diet starting on POD#7 as                         tolerated                         - Virtual follow-up with Dr. Navarro in 1-month.                                                                                          Again, thank you for allowing me to participate in the care of  your patient.      Sincerely,    Gonzalez Navarro MD

## 2023-11-29 NOTE — NURSING NOTE
Patient confirms medications and allergies are accurate via patients echeck in completion, and or denies any changes since last reviewed/verified.     Is the patient currently in the state of [place]? YES    Visit mode:VIDEO    If the visit is dropped, the patient can be reconnected by: VIDEO VISIT: Text to cell phone:   Telephone Information:   Mobile [phone]       Will anyone else be joining the visit? NO  (If patient encounters technical issues they should call [phone]    How would you like to obtain your AVS? MyChart    Are changes needed to the allergy or medication list? No    Reason for visit: RECHECK    Bita SERRANO

## 2023-11-29 NOTE — PROGRESS NOTES
Virtual Visit Details    Type of service:  Video Visit   Video Start Time: 11:35 AM  Video End Time:11:57 AM    Originating Location (pt. Location): Home    Distant Location (provider location):  On-site  Platform used for Video Visit: Well      INTERVENTIONAL ENDOSCOPY OUTPATIENT CLINIC CONSULT  DATE OF SERVICE: 11/29/23   PROVIDER REQUESTING CONSULT: Kaylin Triana PA-C  Reason for Consultation: gastroparesis     ASSESSMENT:  Sofie is a 63 year old female with a PMHx of end stage COPD s/p bilateral lung transplant on 6/28/22 complicated by acute limb ischemia of LUE s/p left radial thrombectomy, h/o C. Diff, h/o EBV viremia, ESRD on dialysis, hemobilia s/p ERCP presumably due to hemorrhage into gallbladder, initially referred for gastroparesis s/p PEGJ placement and chronic diarrhea with +SIBO testing. S/p G-POEM on 10/9/23 here for follow up.    #Gastroparesis  Overall she states not feeling that much different after her G-POEM. Although her self reported GCSI went from 21 to 13 today. May ultimately be a nonresponder. Will repeat gastric emptying study at the 3 month remington and follow up.      #Chronic Osmotic Diarrhea  #Gas/Bloating  #J-tube feeding intolerance  #Weight loss  Her inpatient work up was consistent with an osmotic diarrhea (negative colonoscopy and infectious work up). This would suggest a malabsorptive process and with the associated gas/bloating symptoms and small bowel dysmotility suggested by J-tube feeding intolerance. SIBO testing was positive. Ultimately completed a course of Rifaximin 550 mg TID x 2 weeks. She notes slight improvement after this but not dramatic. She continues on loperamide to 8x a day. I instructed her to try taking oral supplemental fiber such as metamucil. Fiber could also be added to her tube feeds.    RECOMMENDATIONS:  - Repeat gastric emptying and clinic follow up in ~2 months    Gonzalez Navarro MD  [facility]  Division of Gastroenterology  and Hepatology  [facility] [address], [place] [address]    ________________________________________________________________  HPI:  Sofie is a 63 year old female with a PMHx of end stage COPD s/p bilateral lung transplant on 6/28/22 complicated by acute limb ischemia of LUE s/p left radial thrombectomy, h/o C. Diff, h/o EBV viremia, ESRD on dialysis, hemobilia s/p ERCP presumably due to hemorrhage into gallbladder, initially referred for gastroparesis s/p PEGJ placement but but also had chronic diarrhea with +SIBO testing.     PRIOR History:  She essentially developed gastroparesis following her lung transplant and underwent PEGJ placement by IR on 7/27/22. But also of note is development of a pyloric channel ulcer seen on an EGD on 8/11/22. She's been on BID PPI since then. Most recent gastric emptying study on 1/2/2023 shows persistent delayed emptying of 75% at 240 min. She continues on J-tube feeds but does eat sometimes for comfort. Eating can make her symptomatic with nausea, bloating/gas. She will periodically vent her G-tube about once a week, sometimes more frequently if she's been eating, when she has symptoms of bloating/gas and distension which helps. She has never been on Reglan.     She underwent EGD with pyloric Botox injection pm 1/25/23. She noticed an improvement following the procedure although still some residual symptoms. She thinks she is eating more by mouth now and venting less. She continues on her J-tube feeds. Certainly less nausea and bloating.    1/25/23 GCSI= 3,0,0,3,2,2,3,4,4=21   2/24/23 GCSI= 1,0,0,3,1,1,2,2,2=12    Interval History:  She underwent G-POEM procedure on 10/9/23. Procedure was uneventful. She is here for 1 month follow up. She reports not feeling all that much improved with similar symptoms of early satiety. No vomiting. She continues on J-tube feeds but is down to 1 carton a day. Weight stable. She was started on Rifaximin for a course after his  hospital discharge. She thinks maybe there has been a slight improvement in her diarrhea. She now has ~3 loose stools a day.     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    Hepatitis 2017    Hep C, [facility]    History of blood transfusion     HTN (hypertension)     Infectious mononucleosis     Lung infection 11/30/2022    Osteopenia      Patient Active Problem List   Diagnosis    COPD (chronic obstructive pulmonary disease) (H)    Abnormal findings on diagnostic imaging of cardiovascular system    Status post coronary angiogram    Hepatitis C, chronic (H)    S/P lung transplant (H)    Acute post-operative pain    Immunosuppressed status (H24)    Acute kidney failure with tubular necrosis (H24)    Thrombus of left radial artery (H)    Diaphragm dysfunction    Paroxysmal A-fib (H)    Administration of long-term prophylactic antibiotics    EBV (Vibha-Barr virus) viremia    Acute pylorus ulcer    Hypogammaglobulinemia (H24)    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 difficile colitis    Leukopenia, unspecified type    Clinical diagnosis of COVID-19       PSurgHx:  Past Surgical History:   Procedure Laterality Date    BRONCHOSCOPY (RIGID OR FLEXIBLE), DIAGNOSTIC N/A 08/02/2022    Procedure:  BRONCHOSCOPY, DIAGNOSTIC- inspection Bronch;  Surgeon: Kamala Lovell MD;  [facility]    BRONCHOSCOPY (RIGID OR FLEXIBLE), DIAGNOSTIC N/A 09/13/2022    Procedure: INSPECTION BRONCHOSCOPY, WITH BRONCHOALVEOLAR LAVAGE;  Surgeon: Jose R Mccullough MD;  [facility]    BRONCHOSCOPY (RIGID OR FLEXIBLE), DIAGNOSTIC N/A 11/09/2022    Procedure: BRONCHOSCOPY, WITH BRONCHOALVEOLAR LAVAGE AND BIOPSY;  Surgeon: Cesar Lima MD;  [facility]    BRONCHOSCOPY (RIGID OR FLEXIBLE), DIAGNOSTIC N/A 01/25/2023    Procedure: BRONCHOSCOPY, WITH BRONCHOALVEOLAR LAVAGE AND BIOPSY;  Surgeon: Mason Reddy MD;  Location:  GI    BRONCHOSCOPY (RIGID OR FLEXIBLE), DIAGNOSTIC N/A 04/19/2023    Procedure: BRONCHOSCOPY, WITH BRONCHOALVEOLAR LAVAGE AND BIOPSY;  Surgeon: Kamala Lovell MD;  [facility]    BRONCHOSCOPY (RIGID OR FLEXIBLE), DIAGNOSTIC N/A 07/12/2023    Procedure: BRONCHOSCOPY, WITH BRONCHOALVEOLAR LAVAGE AND BIOPSY;  Surgeon: Cesar Lima MD;  Location:  GI    BRONCHOSCOPY FLEXIBLE AND RIGID N/A 07/19/2022    Procedure: BRONCHOSCOPY inspection only;  Surgeon: Bob Liao MD;  Location:  GI    COLONOSCOPY  2015    CORONARY ANGIOGRAPHY ADULT ORDER      CV CORONARY ANGIOGRAM N/A 06/30/2021    Procedure: CV CORONARY ANGIOGRAM;  Surgeon: Alexander Cuellar MD;  Location:  [facility] CARDIAC CATH LAB    CV RIGHT HEART CATH MEASUREMENTS RECORDED N/A 06/30/2021    Procedure: CV RIGHT HEART CATH;  Surgeon: Alexander Cuellar MD;  Location:  HEART CARDIAC CATH LAB    ENDOSCOPIC PERORAL MYOTOMY N/A 10/09/2023    Procedure: MYOTOMY, ESOPHAGUS, ENDOSCOPIC, ORAL APPROACH;  Surgeon: Gonzalez Navarro MD;  [facility]    ENDOSCOPIC RETROGRADE CHOLANGIOPANCREATOGRAM N/A 08/11/2022    Procedure: ENDOSCOPIC RETROGRADE CHOLANGIOPANCREATOGRAPHY WITH PANCREATIC DUCT NEEDLE KNIFE AND STENT PLACEMENT, BILE DUCT SPHINCTEROTOMY, BLOOD/DEBRIS REMOVAL AND STENT PLACEMENT;  Surgeon: Cosmo Arroyo MD;   [facility]    ENDOSCOPIC RETROGRADE CHOLANGIOPANCREATOGRAM N/A 10/07/2022    Procedure: ENDOSCOPIC RETROGRADE CHOLANGIOPANCREATOGRAPHY with biliary and pancreatic stent removal, debris removal;  Surgeon: Cosmo Arroyo MD;  [facility]    ENT SURGERY  1974    tonsillectomy    ENTEROSCOPY SMALL BOWEL N/A 08/11/2022    Procedure: SMALL BOWEL ENTEROSCOPY;  Surgeon: Cosmo Arroyo MD;  [facility]    ESOPHAGOGASTRODUODENOSCOPY, WITH NASOGASTRIC TUBE INSERTION N/A 07/01/2022    Procedure: ESOPHAGOGASTRODUODENOSCOPY, WITH NASOJEJUNAL TUBE INSERTION;  Surgeon: Ozzy Nickerson MD;  [facility]    ESOPHAGOSCOPY, GASTROSCOPY, DUODENOSCOPY (EGD), COMBINED N/A 08/03/2022    Procedure: ESOPHAGOGASTRODUODENOSCOPY (EGD);  Surgeon: Ira Andres MD;  [facility]    ESOPHAGOSCOPY, GASTROSCOPY, DUODENOSCOPY (EGD), COMBINED N/A 01/25/2023    Procedure: ESOPHAGOGASTRODUODENOSCOPY (EGD) with botox injection;  Surgeon: Gonzalez Navarro MD;  [facility]    ESOPHAGOSCOPY, GASTROSCOPY, DUODENOSCOPY (EGD), COMBINED N/A 10/09/2023    Procedure: ESOPHAGOGASTRODUODENOSCOPY;  Surgeon: Gonzalez Navarro MD;  [facility]    HAND SURGERY      INSERT CHEST TUBE Right 09/13/2022    Procedure: Insert chest tube;  Surgeon: Jose R Mccullough MD;  [facility]    IR CVC TUNNEL PLACEMENT > 5 YRS OF AGE  09/26/2022    IR GASTRO JEJUNOSTOMY TUBE CHANGE  08/31/2022    IR GASTRO JEJUNOSTOMY TUBE CHANGE  12/21/2022    IR GASTRO JEJUNOSTOMY TUBE CHANGE  07/12/2023    IR GASTRO JEJUNOSTOMY TUBE CHANGE  08/18/2023    IR GASTRO JEJUNOSTOMY TUBE CHANGE  11/14/2023    IR GASTRO JEJUNOSTOMY TUBE PLACEMENT  07/27/2022    IR THORACENTESIS  08/29/2022    LEEP TX, CERVICAL  04/07/2017    HECTOR III    LYMPH NODE BIOPSY Left 2005    Left axilla, benign- [place]    MIDLINE INSERTION - DOUBLE LUMEN Left 07/28/2022    20cm, Basilic vein    REPLACE GASTROJEJUNOSTOMY TUBE, PERCUTANEOUS  10/07/2022    Procedure:  Replace Gastrojejunostomy Tube;  Surgeon: Cosmo Arroyo MD;  [facility]    THORACENTESIS Left 08/29/2022    Procedure: THORACENTESIS;  Surgeon: Bo Capone PA-C;  Location: [facility]    THORACENTESIS Left 09/13/2022    Procedure: Thoracentesis;  Surgeon: Jose R Mccullough MD;  [facility]    THROMBECTOMY UPPER EXTREMITY Left 07/02/2022    Procedure: LEFT RADIAL ARM THROMBECTOMY;  Surgeon: Christie Graham MD;  [facility]    TRANSPLANT LUNG RECIPIENT SINGLE X2 Bilateral 06/28/2022    Procedure: Clamshell Incision, Bilateral Sequential Lung Transplant, On Cardiopulmonary Bypass, Flexible Bronchoscopy;  Surgeon: Sue Sunshine MD;  [facility]       MEDS:  Current Outpatient Medications   Medication    metoprolol tartrate (LOPRESSOR) 50 MG tablet    alcohol swab prep pads    azaTHIOprine (IMURAN) 5 mg/mL SUSP    B Complex-C-Folic Acid (DIALYVITE) TABS    blood glucose (CONTOUR NEXT TEST) test strip    blood glucose (NO BRAND SPECIFIED) lancets standard    blood glucose monitoring (CONTOUR NEXT MONITOR W/DEVICE KIT) meter device kit    Calcium Carbonate-Vitamin D 600-10 MG-MCG TABS    dapsone 2 mg/mL SUSP    dicyclomine (BENTYL) 10 MG/5ML solution    loperamide (IMODIUM A-D) 2 MG tablet    multivitamin (CENTRUM SILVER) tablet    pantoprazole (PROTONIX) 2 mg/mL SUSP suspension    predniSONE (DELTASONE) 5 MG tablet    protein modular (PROSOURCE TF) LIQD    sevelamer carbonate, RENVELA, 0.8 GM PACK Packet    Sharps Container MISC    tacrolimus (GENERIC) 1 mg/mL suspension    vitamin B-12 (CYANOCOBALAMIN) 500 MCG tablet     No current facility-administered medications for this visit.     ALLERGIES:    Allergies   Allergen Reactions    Blood Transfusion Related (Informational Only) Other (See Comments)     Patient has a history of a clinically significant antibody against RBC antigens.  A delay in compatible RBCs may occur.     No Clinical Screening - See Comments Other (See  Comments)     Patient has a history of a clinically significant antibody against RBC antigens.  A delay in compatible RBCs may occur.    Azithromycin Rash     12/1/22: Developed a rash that is not raised and looks diffuse in nature. It started in the groin and up the back and has now worked its way up her chest into her face. Pt states that it has now started to itch. She is breathing and talking normally and denies any airway changes. Unclear what started rash. Pt noted feeling somewhat itchy yesterday.    Ganciclovir Rash     12/1/22: Developed a rash that is not raised and looks diffuse in nature. It started in the groin and up the back and has now worked its way up her chest into her face. Pt states that it has now started to itch. She is breathing and talking normally and denies any airway changes. Unclear what started rash. Pt noted feeling somewhat itchy yesterday.     FHx: Noncontributory    SOCIAL Hx:  Social History     Socioeconomic History    Marital status:      Spouse name: Not on file    Number of children: Not on file    Years of education: Not on file    Highest education level: Not on file   Occupational History    Not on file   Tobacco Use    Smoking status: Former     Packs/day: 0.50     Years: 30.00     Additional pack years: 0.00     Total pack years: 15.00     Types: Cigarettes     Quit date: 11/11/2020     Years since quitting: 3.0    Smokeless tobacco: Never   Substance and Sexual Activity    Alcohol use: Not Currently     Comment: Stopped drinking in 2020    Drug use: Not Currently     Types: Marijuana, Methamphetamines     Comment: hx:marijuana and methamphetamine-quit both unsure ?  2-3 yrs ago    Sexual activity: Not on file   Other Topics Concern    Parent/sibling w/ CABG, MI or angioplasty before 65F 55M? Not Asked   Social History Narrative    Not on file     Social Determinants of Health     Financial Resource Strain: Not on file   Food Insecurity: Not on file  "  Transportation Needs: Not on file   Physical Activity: Not on file   Stress: Not on file   Social Connections: Not on file   Interpersonal Safety: Not At Risk (9/1/2023)    Humiliation, Afraid, Rape, and Kick questionnaire     Fear of Current or Ex-Partner: No     Emotionally Abused: No     Physically Abused: No     Sexually Abused: No   Housing Stability: Not on file       ROS: A comprehensive Review of Systems was asked and answered in the negative unless specifically commented upon in the HPI    Physical Exam  Ht 1.575 m (5' 2\")   Wt 49 kg (108 lb)   BMI 19.75 kg/m    Body mass index is 19.75 kg/m .  Gen: A&Ox3, NAD  HEENT: Moist mucus membranes, no scleral icterus.  Lungs: no respiratory distress      LABS:  External Order Results on 12/30/2022   Component Date Value Ref Range Status    Sodium (External) 12/30/2022 137  136 - 146 mmol/L Final    Potassium (External) 12/30/2022 4.1  3.5 - 5.1 mmol/L Final    Chloride (External) (External) 12/30/2022 97 (L)  98 - 107 mmol/L Final    CO2 (External) 12/30/2022 27  22 - 29 mmol/L Final    Anion Gap (External) 12/30/2022 17.1  10.0 - 20.0 mmol/L Final    Creatinine (External) 12/30/2022 4.34  mg/dL Final    Urea Nitrogen (External) 12/30/2022 43.6 (H)  10.0 - 20.0 mg/dL Final    BUN/Creatinine Ratio (External) 12/30/2022 10.05 (L)  11.70 - 22.90 ratio Final    Calcium (External) 12/30/2022 9.5  8.6 - 10.5 mg/dL Final    Glucose (External) 12/30/2022 102 (H)  70 - 100 mg/dL Final    GFR Estimated (External) 12/30/2022 11 (L)  >=60 ml/min/1.73m2 Final    Magnesium (External) 12/30/2022 2.5  1.8 - 2.6 mg/dL Final    WBC Count (External) 12/30/2022 4.7  10(3)/uL Final    RBC Count (External) 12/30/2022 2.81  10(6)/uL Final    Hemoglobin (External) 12/30/2022 10.3  g/dL Final    Hematocrit (External) 12/30/2022 31.1  % Final    MCV (External) 12/30/2022 110.7  fL Final    MCH (External) 12/30/2022 36.5  pg Final    MCHC (External) 12/30/2022 33.0  32.0 - 36.0 g/dL " Final    RDW (External) 12/30/2022 16.2  % Final    Platelet Count (External) 12/30/2022 251  179 - 450 10(3)/uL Final         IMAGING:  EXAM:  NM GASTRIC EMPTYING, 1/2/2023 1:21 PM  HISTORY: Gastroparesis; Lung replaced by transplant (H)     TECHNIQUE: The patient ingested 2.1 mCi of Tc-99m sulfur colloid in 2  eggs, 2 pieces of toast w/ jelly. Static images were acquired at  approximately 1 hour intervals out through 4 hours using a dual head  gamma camera in an anterior and posterior position. The calculation of  emptying was based on dual head imaging with geometric mean  calculations.  A Linear square fit was calculated to the emptying  curve to determine the amount of residual activity at each time point.     FINDINGS:   Percent retention at 60 min = 87%.  Percent retention at 120 min = 77%.  Percent retention at 180 min = 77%.  Percent retention at 240 min = 75%.     T 1/2 emptying time =  238 mins.                                                                      IMPRESSION: Delayed gastric emptying    EXAM:  NM GASTRIC EMPTYING, 9/30/2022 12:32 PM     HISTORY: severe delayed gastric emptying for follow up; is off  dialysis. NPO weds night     TECHNIQUE: The patient ingested 2.3 mCi of Tc-99m sulfur colloid in in  2 eggs, 1 slices of toast with jelly, and a glass of water. Static  images were acquired at approximately 1 hour intervals out through 4  hours using a dual head gamma camera in an anterior and posterior  position. The calculation of emptying was based on dual head imaging  with geometric mean calculations.  A Linear square fit was calculated  to the emptying curve to determine the amount of residual activity at  each time point.     FINDINGS:   Percent retention at 60 min = 97%.  Percent retention at 120 min = 95%.  Percent retention at 180 min = 91%.  Percent retention at 240 min = 91%.     T 1/2 emptying time =  Too long to calculate.                                                                       IMPRESSION: Severe delayed gastric emptying    Endoscopies:  Upper GI Endoscopy 10/09/2023 11:50 AM [facility]   [address] [place] [address] [phone]     Endoscopy Department  _______________________________________________________________________________  Patient Name: Sofie Rodriguez            Procedure Date: 10/9/2023 11:50 AM  MRN: [id number]                       Account Number: [id number]  YOB: 1960               Admit Type: Outpatient  Age: 63                               Room:  OR   Gender: Female                        Note Status: Supervisor Override  Attending MD: OSKAR PÉREZ MD,      Pause for the Cause: time out performed  Total Sedation Time:                    _______________________________________________________________________________     Procedure:             Upper GI endoscopy  Indications:           For therapy of gastroparesis; 63 year old female with                         a PMHx of 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 Diff, h/o EBV                         viremia, ESRD on dialysis, hemobilia s/p ERCP                         presumably due to hemorrhage into gallbladder,                         gastroparesis s/p PEGJ and diarrhea related to SIBO.                         Responded previously to pyloric botox. Patient here                         for G-POEM. GCSI= 3,0,0,3,2,2,3,4,4=21  Providers:             OSKAR PÉREZ MD  Referring MD:            Requesting Provider:   MICHELE SOSA MD  Medicines:             General Anesthesia, IV Zosyn 3.375g  Complications:         No immediate complications. Estimated blood loss:                         Minimal.  _______________________________________________________________________________  Procedure:              Pre-Anesthesia Assessment:                         - Prior to the procedure, a History and Physical was                         performed, and patient medications and allergies were                         reviewed. The patient is competent. The risks and                         benefits of the procedure and the sedation options and                         risks were discussed with the patient. All questions                         were answered and informed consent was obtained.                         Patient identification and proposed procedure were                         verified by the physician, the nurse, the                         anesthesiologist and the anesthetist in the procedure                         room. Mental Status Examination: alert and oriented.                         Airway Examination: normal oropharyngeal airway and                         neck mobility. Respiratory Examination: clear to                         auscultation. CV Examination: normal. Prophylactic                         Antibiotics: The patient requires prophylactic                         antibiotics peroral endoscopic myotomy. Prior                         Anticoagulants: The patient has taken no anticoagulant                         or antiplatelet agents. ASA Grade Assessment: III - A                         patient with severe systemic disease. After reviewing                         the risks and benefits, the patient was deemed in                         satisfactory condition to undergo the procedure. The                         anesthesia plan was to use general anesthesia.                         Immediately prior to administration of medications,                         the patient was re-assessed for adequacy to receive                         sedatives. The heart rate, respiratory rate, oxygen                         saturations, blood pressure, adequacy of pulmonary                         ventilation, and  response to care were monitored                         throughout the procedure. The physical status of the                         patient was re-assessed after the procedure.                         After obtaining informed consent, the endoscope was                         passed under direct vision. Throughout the procedure,                         the patient's blood pressure, pulse, and oxygen                         saturations were monitored continuously. The Endoscope                         was introduced through the mouth, and advanced to the                         second part of duodenum. The was introduced through                         the mouth, and advanced to the. After obtaining                         informed consent, the endoscope was passed under                         direct vision. Throughout the procedure, the patient's                         blood pressure, pulse, and oxygen saturations were                         monitored continuously.The endoscopic myotomy was                         accomplished without difficulty. The patient tolerated                         the procedure well.                                                                                  Findings:       The esophagus was normal.       There was evidence of an intact gastrostomy with a patent GJ-tube       present in the gastric body extending into the duodneum. This was       characterized by healthy appearing mucosa. Small amount of retained food       and fluid in the stomach.       The examined duodenum was normal.       Localized moderate mucosal changes characterized by nodularity were       found at the pylorus at the 11 o'clock position. Likely hyperplastic       from GJ tube.       Preparations were made for gastric peroral endoscopic myotomy (G-POEM).       The stomach was cleaned and irrigated using saline and suctioned.       Mucosotomy followed by myotomy were performed in a greater curvature        orientation. First, a submucosal injection of a solution of methylene       blue and saline was used to lift the mucosa at the site of the initial       mucosotomy. The initial mucosal incision was made transversely at       approximately 4.0 cm proximal to the pylorus using a HybridKnife I-Type.       Next, the endoscope with a clear cap was used to enter into the       submucosal tunnel. The submucosal tunnel was then further created by       dissection of the submucosal fibers distally to the pyloric ring. A full       thickness myotomy (in a greater curvature orientation) was then       performed beginning at the pylorus using an ITknife2. The myotomy was       extended to 3.0 cm proximal to the pylorus. Intra procedure bleeding was       mild. A small Coagrasper was used effectively for hemostasis. After       completion of the myotomy, examination of the stomach and duodenum       showed no inadvertent mucosotomy. There was no evidence of bleeding       noted on the inspection of the myotomy edges and submucosal tunnel. The       myotomy was successfully performed. The mucosal entrance to the       submucosal tunnel was closed using endoscopic suturing. After G-POEM and       endoscope removal, the patient was examined. The patient's abdomen was       nondistended. Pylorus lumen open without resistance to passage.                                                                                   Impression:            - Normal esophagus.                         - GJ tube in place and not manipulated                         - Normal examined duodenum.                         - Nodularity was found at the pylorus at the 11                         o'clock position. Likely hyperplastic from GJ tube.                         - Gastric peroral endoscopic myotomy (G-POEM) was                         performed along greater curvature as described above.                         Mucosotomy closed with endoscopic  suturing.                         - No specimens collected.  Recommendation:        - Monitor in PACU and admit to medicine for                         observation.                         - NPO overnight. Leave G-tube to gravity and can start                         J-tube feeds tonight as tolerated                         - Started pantoprazole 40 mg IV BID.                         - Upon discharge, prescribe pantoprazole 40 mg PO BID                         x1-month.                         - Continue Zosyn IV while inpatient.                         - Patient had a positive breath test for SIBO but                         insurance denied Rifaximin 550 mg TID x 2 weeks.                         Consider attempting re-prescribing on discharge as did                         not respond to course of cipro. Does not need                         additional antibiotics related to procedure.                         - No anticoagulation or antiplatelets for 3-days.                         - Ordered head of bed elevation to 30-45 degrees.                         - Ordered incentive spirometry.                         - Order analgesia IV & antiemetics IV PRN.                         - Ordered CBC & BMP tomorrow AM                         - Ordered Upper GI series tomorrow AM to rule-out                         post-POEM gastric leak. Pneumoperitoneum expected.                         - If no gastric leak, and otherwise doing clinically                         well discharge home tomorrow and start full liquid diet                         - Liquid diet for three days starting tomorrow. Soft                         diet on day 4, 5,6. Regular diet starting on POD#7 as                         tolerated                         - Virtual follow-up with Dr. Navarro in 1-month.                                                                                     Gonzalez Navarro MD

## 2023-11-30 DIAGNOSIS — K31.84 GASTROPARESIS: Primary | ICD-10-CM

## 2023-11-30 NOTE — PATIENT INSTRUCTIONS
You will find a brief summary of your discussion and care plan from today's visit below.  Dr Navarro has outlined the following steps after your recent clinic visit:    RECOMMENDATIONS:  - Repeat gastric emptying and clinic follow up in ~2 months    Please call with any questions or concerns regarding your clinic visit today.     It is a pleasure being involved in your health care.     Contacts post-consultation depending on your need:     Schedule Clinic Appointments                        [phone], option 1    Teresa Vogel RN Care Coordinator           [phone]     Jalen Jackson OR                           [phone]     GI Procedure Scheduling                               [phone], option 2     For urgent/emergent questions after business hours, you may reach the on-call GI Fellow by contacting the [facility]  at [phone].    How to I schedule a follow-up visit?  If you did not schedule a follow-up visit today, please call [phone] option #1 to schedule a follow-up office visit.       How do I schedule labs, imaging studies, or procedures that were ordered in clinic today?      Labs: To schedule lab appointment at the [facility], use my chart or call [phone]. If you have a [facility] closer to home where you are regularly seen you can give them a call.      Procedures: If a colonoscopy, upper endoscopy, breath test, esophageal manometry, or pH impedence was ordered today, our endoscopy team will call you to schedule this. If you have not heard from our endoscopy team within a week, please call [phone] to schedule.      Imaging Studies: If you were scheduled for a CT scan, X-ray, MRI, ultrasound, HIDA scan or other imaging study, please call [phone] to have this scheduled.      Referral: If a referral to another specialty was ordered, expect a phone call or follow instructions above. If you have not heard from anyone regarding  your referral in a week, please call our clinic to check the status.     I recommend signing up for [employer] access if you have not already done so and are comfortable with using a computer.  This allows for online access to your lab results and also helps you communicate efficiently with the clinic should any questions arise in your care.

## 2023-12-01 DIAGNOSIS — Z94.2 S/P LUNG TRANSPLANT (H): Chronic | ICD-10-CM

## 2023-12-01 RX ORDER — METOPROLOL TARTRATE 50 MG
25 TABLET ORAL 2 TIMES DAILY
Qty: 60 TABLET | Refills: 11 | Status: SHIPPED | OUTPATIENT
Start: 2023-12-01 | End: 2024-09-24

## 2023-12-06 ENCOUNTER — TELEPHONE (OUTPATIENT)
Dept: TRANSPLANT | Facility: CLINIC | Age: 63
End: 2023-12-06

## 2023-12-06 NOTE — TELEPHONE ENCOUNTER
[facility]    Phone Message    Reason for Call: Other: Left a message for the patient regarding her upcoming Transplant Evaluation, also sent a mychart. If patient calls back, please go over the eval scheduled for 1/31.

## 2023-12-16 ENCOUNTER — HEALTH MAINTENANCE LETTER (OUTPATIENT)
Age: 63
End: 2023-12-16

## 2023-12-26 ENCOUNTER — DOCUMENTATION ONLY (OUTPATIENT)
Dept: TRANSPLANT | Facility: CLINIC | Age: 63
End: 2023-12-26
Payer: MEDICARE

## 2023-12-26 ASSESSMENT — ENCOUNTER SYMPTOMS: NEW SYMPTOMS OF CORONARY ARTERY DISEASE: 0

## 2024-01-03 DIAGNOSIS — N18.6 ESRD (END STAGE RENAL DISEASE) (H): ICD-10-CM

## 2024-01-03 DIAGNOSIS — D84.9 IMMUNOSUPPRESSED STATUS (H): ICD-10-CM

## 2024-01-03 DIAGNOSIS — Z94.2 S/P LUNG TRANSPLANT (H): Primary | ICD-10-CM

## 2024-01-08 NOTE — PROGRESS NOTES
[facility]  January 10, 2024         Assessment and Plan:   Sofie Rodriguez is a 63 year old female with h/o bilateral lung transplant for COPD on 6/28/22 with course complicated by post-operative hemidiaphragm palsy, persistent pleural effusions, recurrent PNAs, positive DSA, EBV viremia, hypogammaglobulinemia, severe gastroparesis s/p G/J tube placement 7/27/22 with pyloric botox 1/25/23, GI bleed 2/2 pyloric ulcer, hemobilia s/p ERCP and MRCP, chronic diarrhea, recurrent C diff colitis, and ESRD on HD. PMH also notable for HFpEF, ASCVD, NTM colonization, hep C, and methamphetamine use. Admitted last May for intolerance of tube feedings with weight loss, neutropenia, and failure to thrive. Was supposed to be readmitted for FTT in July, but she eventually refused. Admitted to OSH 12/4-12/31 in December for right hip fracture s/p ORIF, now with significant deconditioning. This is her first post discharge follow up.     1. S/p bilateral lung transplant:   Right hemidiaphragm palsy:   Suspected CARLEE: last seen in August, was doing minimal walking prior to her fall in December, denies new pulmonary complaints. Declined post surgery PT and her activity now if very limited after a month in the hospital. Sating 96% on room air. CMV 7/25 negative. ImmuKnow assay 73 on 5/18, indicative of increased risk of infection, down to 43 on recheck in June. CXR reviewed today and demonstrates slight increase in left effusion. PFTs down 320 ml today, significantly below her baseline (1.4-1.5L). CT chest completed after the visit today reviewed by me and demonstrates stable left upper posterior opacity, though to be atelectasis based on prior conversations with few patchy areas of LLL.   - Recheck Immunknow, AZA has been on hold for many months given low values prior  - Check cf DNA  - Schedule follow up with sleep to discuss possible CPAP, given recommendations from August  sleep study  - Continue IS including tacrolimus (goal 8-12) and prednisone  - Dapsone qMWF for PJP ppx  - Start pulm rehab for lungs and hip  - See patient back in one month with possible bronch/BAL, biopsies (C4D staining)?     2. Positive DSA: no prior treatment for AMR, has been watched for quite some time given no pulmonary symptoms, prior PFTs stability and significant and ongoing failure to thrive. Last DSA down to 6866 (DQB2), had peaked in fall of 2022.   - DSA pending, will consider AMR treatment      3. EBV viremia: last level of 24K (log 4.4) on 7/25.   - EBV pending for today     4. Severe malnutrition:  FTT:   Gastroparesis s/p PEG/J and botox:   S/p G-POEM  Pyloric ulcer:  Chronic nausea and osmotic diarrhea:  SIBO s/p rifaximin:   Recurrent C diff colitis: chronic diarrhea since transplant with recurrent episodes of C diff. GI consulted, felt stools consistent with osmotic diarrhea. Transitioned to continuous TF, able to tolerate and now unable to tolerate bolus TF. Last summer declined readmission. Following with Dr. Navarro, ongoing nausea and inability to tolerate food or TF (getting in about 3-6 cans FV per week, supposed to be getting in 4 cans TF per day). Has gastric emptying study scheduled for next Monday.   - Continue imodium, pantoprazole, TF    5. CKD:   HTN: BP overall controlled. Doing dialysis T/Th/Sat. Transplant pre evaluation is currently on hold for kidney until we can improve her GI issues/nutrition/weight gain and now her pulmonary function.     6. Acute anemia: suspect related to recent surgery/admission. Hgb was in the mid 8s at OSH, so 9.8 today improved. Monitor.     Excluding time spent reading PFTs, I have spent > 60 minutes on the day of encounter reviewing the patient's chart including OSH notes, vitals, labs, imaging, and medications and formulating plan of care with the patient and coordinator.     RTC: 1 month  Vaccinations: got her flu, covid and RSV vaccine  Annual  "dermatology visit: following Derm   Preventative: DEXA > June 2025; colonoscopy completed March 2023 (diverticulosis in the sigmoid colon and colonic polyp noted s/p polypectomy)--needs repeat in 2026 per notes    Kaylin Triana PA-C  Pulmonary, Allergy, Critical Care and Sleep Medicine        Interval History:     Prior to her admission in December, patient was doing okay. Was doing some minor exercises at home with some walking 2 blockers. Currently has a wheeled walker, declined doing PT when she left the hospital. Denies recent lung complaints, no fever or chills. No nasal or sinus congestion, no drainage. Minimal cough, no congestion or tightness. No new or unexpected shortness of breath. No chest pain or palpitations. Nausea is still present, not improved on the feeding tube or regular tube. Has a gastric emptying study on Monday. No vomiting. Doing only one carton of TF about 3-6 days per week (is supposed to be getting in 4 cartons seven days per week). No abdominal pain, stools are normal 1/2 the time and the other 1/2 it's \"mush or diarrhea.\"           Review of Systems:   Please see HPI, otherwise the complete 10 point ROS is negative.           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    Hepatitis 2017    Hep C, [facility]    History of blood transfusion     HTN (hypertension)     Infectious mononucleosis     Lung infection 11/30/2022    Osteopenia      Past Surgical History:   Procedure Laterality Date    BRONCHOSCOPY (RIGID OR FLEXIBLE), DIAGNOSTIC N/A 08/02/2022    Procedure: BRONCHOSCOPY, DIAGNOSTIC- inspection Bronch;  Surgeon: Kamala Lovell MD;  Location: [facility]    BRONCHOSCOPY (RIGID OR FLEXIBLE), DIAGNOSTIC N/A 09/13/2022    Procedure: " INSPECTION BRONCHOSCOPY, WITH BRONCHOALVEOLAR LAVAGE;  Surgeon: Jose R Mccullough MD;  Location: [facility]    BRONCHOSCOPY (RIGID OR FLEXIBLE), DIAGNOSTIC N/A 11/09/2022    Procedure: BRONCHOSCOPY, WITH BRONCHOALVEOLAR LAVAGE AND BIOPSY;  Surgeon: Cesar Lima MD;  Location: [facility]    BRONCHOSCOPY (RIGID OR FLEXIBLE), DIAGNOSTIC N/A 01/25/2023    Procedure: BRONCHOSCOPY, WITH BRONCHOALVEOLAR LAVAGE AND BIOPSY;  Surgeon: Mason Reddy MD;  Location: [facility]    BRONCHOSCOPY (RIGID OR FLEXIBLE), DIAGNOSTIC N/A 04/19/2023    Procedure: BRONCHOSCOPY, WITH BRONCHOALVEOLAR LAVAGE AND BIOPSY;  Surgeon: Kamala Lovell MD;  Location: [facility]    BRONCHOSCOPY (RIGID OR FLEXIBLE), DIAGNOSTIC N/A 07/12/2023    Procedure: BRONCHOSCOPY, WITH BRONCHOALVEOLAR LAVAGE AND BIOPSY;  Surgeon: Cesar Lima MD;  Location:  GI    BRONCHOSCOPY FLEXIBLE AND RIGID N/A 07/19/2022    Procedure: BRONCHOSCOPY inspection only;  Surgeon: Bob Liao MD;  Location: [facility]    COLONOSCOPY  2015    CORONARY ANGIOGRAPHY ADULT ORDER      CV CORONARY ANGIOGRAM N/A 06/30/2021    Procedure: CV CORONARY ANGIOGRAM;  Surgeon: Alexander Cuellar MD;  Location:  HEART CARDIAC CATH LAB    CV RIGHT HEART CATH MEASUREMENTS RECORDED N/A 06/30/2021    Procedure: CV RIGHT HEART CATH;  Surgeon: Alexander Cuellar MD;  Location:  HEART CARDIAC CATH LAB    ENDOSCOPIC PERORAL MYOTOMY N/A 10/09/2023    Procedure: MYOTOMY, ESOPHAGUS, ENDOSCOPIC, ORAL APPROACH;  Surgeon: Gonzalez Navarro MD;  [facility]: [facility]    ENDOSCOPIC RETROGRADE CHOLANGIOPANCREATOGRAM N/A 08/11/2022    Procedure: ENDOSCOPIC RETROGRADE CHOLANGIOPANCREATOGRAPHY WITH PANCREATIC DUCT NEEDLE KNIFE AND STENT PLACEMENT, BILE DUCT SPHINCTEROTOMY, BLOOD/DEBRIS REMOVAL AND STENT PLACEMENT;  Surgeon: Cosmo Arroyo MD;  Location: [facility]    ENDOSCOPIC RETROGRADE CHOLANGIOPANCREATOGRAM N/A 10/07/2022    Procedure: ENDOSCOPIC RETROGRADE CHOLANGIOPANCREATOGRAPHY with biliary and pancreatic stent  removal, debris removal;  Surgeon: Cosmo Arroyo MD;  Location: [facility]    ENT SURGERY  1974    tonsillectomy    ENTEROSCOPY SMALL BOWEL N/A 08/11/2022    Procedure: SMALL BOWEL ENTEROSCOPY;  Surgeon: Cosmo Arroyo MD;  Location: [facility]    ESOPHAGOGASTRODUODENOSCOPY, WITH NASOGASTRIC TUBE INSERTION N/A 07/01/2022    Procedure: ESOPHAGOGASTRODUODENOSCOPY, WITH NASOJEJUNAL TUBE INSERTION;  Surgeon: Ozzy Nickerson MD;  Location:  GI    ESOPHAGOSCOPY, GASTROSCOPY, DUODENOSCOPY (EGD), COMBINED N/A 08/03/2022    Procedure: ESOPHAGOGASTRODUODENOSCOPY (EGD);  Surgeon: Ira Andres MD;  Location:  GI    ESOPHAGOSCOPY, GASTROSCOPY, DUODENOSCOPY (EGD), COMBINED N/A 01/25/2023    Procedure: ESOPHAGOGASTRODUODENOSCOPY (EGD) with botox injection;  Surgeon: Gonzalez Navarro MD;  Location:  GI    ESOPHAGOSCOPY, GASTROSCOPY, DUODENOSCOPY (EGD), COMBINED N/A 10/09/2023    Procedure: ESOPHAGOGASTRODUODENOSCOPY;  Surgeon: Gonzalez Navarro MD;  Location:  OR    HAND SURGERY      INSERT CHEST TUBE Right 09/13/2022    Procedure: Insert chest tube;  Surgeon: Jose R Mccullough MD;  Location:  GI    IR CVC TUNNEL PLACEMENT > 5 YRS OF AGE  09/26/2022    IR GASTRO JEJUNOSTOMY TUBE CHANGE  08/31/2022    IR GASTRO JEJUNOSTOMY TUBE CHANGE  12/21/2022    IR GASTRO JEJUNOSTOMY TUBE CHANGE  07/12/2023    IR GASTRO JEJUNOSTOMY TUBE CHANGE  08/18/2023    IR GASTRO JEJUNOSTOMY TUBE CHANGE  11/14/2023    IR GASTRO JEJUNOSTOMY TUBE PLACEMENT  07/27/2022    IR THORACENTESIS  08/29/2022    LEEP TX, CERVICAL  04/07/2017    HECTOR III    LYMPH NODE BIOPSY Left 2005    Left axilla, benign- [place]    MIDLINE INSERTION - DOUBLE LUMEN Left 07/28/2022    20cm, Basilic vein    REPLACE GASTROJEJUNOSTOMY TUBE, PERCUTANEOUS  10/07/2022    Procedure: Replace Gastrojejunostomy Tube;  Surgeon: Cosmo Arroyo MD;  [facility]: [facility]    THORACENTESIS Left 08/29/2022    Procedure: THORACENTESIS;  Surgeon: Bo Capone  FLOYD Santoyo;  Location: [facility] OR    THORACENTESIS Left 09/13/2022    Procedure: Thoracentesis;  Surgeon: Jose R Mccullough MD;  Location: [facility]    THROMBECTOMY UPPER EXTREMITY Left 07/02/2022    Procedure: LEFT RADIAL ARM THROMBECTOMY;  Surgeon: Christie Graham MD;  Location:  OR    TRANSPLANT LUNG RECIPIENT SINGLE X2 Bilateral 06/28/2022    Procedure: Clamshell Incision, Bilateral Sequential Lung Transplant, On Cardiopulmonary Bypass, Flexible Bronchoscopy;  Surgeon: Sue Sunshine MD;  Location:  OR           Family History:     No family history on file.         Social History:     Social History     Socioeconomic History    Marital status:      Spouse name: Not on file    Number of children: Not on file    Years of education: Not on file    Highest education level: Not on file   Occupational History    Not on file   Tobacco Use    Smoking status: Former     Packs/day: 0.50     Years: 30.00     Additional pack years: 0.00     Total pack years: 15.00     Types: Cigarettes     Quit date: 11/11/2020     Years since quitting: 3.1    Smokeless tobacco: Never   Substance and Sexual Activity    Alcohol use: Not Currently     Comment: Stopped drinking in 2020    Drug use: Not Currently     Types: Marijuana, Methamphetamines     Comment: hx:marijuana and methamphetamine-quit both unsure ?  2-3 yrs ago    Sexual activity: Not on file   Other Topics Concern    Parent/sibling w/ CABG, MI or angioplasty before 65F 55M? Not Asked   Social History Narrative    Not on file     Social Determinants of Health     Financial Resource Strain: Not on file   Food Insecurity: Not on file   Transportation Needs: Not on file   Physical Activity: Not on file   Stress: Not on file   Social Connections: Not on file   Interpersonal Safety: Not At Risk (9/1/2023)    Humiliation, Afraid, Rape, and Kick questionnaire     Fear of Current or Ex-Partner: No     Emotionally Abused: No     Physically Abused: No     Sexually  "Abused: No   Housing Stability: Not on file            Medications:     Current Outpatient Medications   Medication    azaTHIOprine (IMURAN) 5 mg/mL SUSP    B Complex-C-Folic Acid (DIALYVITE) TABS    Calcium Carbonate-Vitamin D 600-10 MG-MCG TABS    dapsone 2 mg/mL SUSP    loperamide (IMODIUM A-D) 2 MG tablet    metoprolol tartrate (LOPRESSOR) 50 MG tablet    multivitamin (CENTRUM SILVER) tablet    pantoprazole (PROTONIX) 2 mg/mL SUSP suspension    predniSONE (DELTASONE) 5 MG tablet    protein modular (PROSOURCE TF) LIQD    sevelamer carbonate, RENVELA, 0.8 GM PACK Packet    tacrolimus (GENERIC) 1 mg/mL suspension    vitamin B-12 (CYANOCOBALAMIN) 500 MCG tablet     No current facility-administered medications for this visit.            Physical Exam:   BP (!) 142/73   Pulse 90   Resp 17   Ht 1.575 m (5' 2\")   Wt 47.2 kg (104 lb)   SpO2 96%   BMI 19.02 kg/m      GENERAL: alert, NAD  HEENT: NCAT, EOMI, no scleral icterus, oral mucosa moist and without lesions  Neck: no cervical or supraclavicular adenopathy  Lungs: moderate air flow, scattered basilar crackles  CV: RRR, S1S2, + murmur  Abdomen: normoactive BS, soft, non tender  Lymph: no edema  Neuro: AAO X 3, CN 2-12 grossly intact  Psychiatric: normal affect, good eye contact  Skin: no rash, jaundice or lesions on limited exam         Data:   All laboratory and imaging data reviewed.      Recent Results (from the past 168 hour(s))   General PFT Lab (Please always keep checked)    Collection Time: 01/10/24  8:43 AM   Result Value Ref Range    FVC-Pred 2.79 L    FVC-Pre 1.12 L    FVC-%Pred-Pre 39 %    FEV1-Pre 1.10 L    FEV1-%Pred-Pre 49 %    FEV1FVC-Pred 80 %    FEV1FVC-Pre 98 %    FEFMax-Pred 6.09 L/sec    FEFMax-Pre 4.21 L/sec    FEFMax-%Pred-Pre 69 %    FEF2575-Pred 2.06 L/sec    FEF2575-Pre 3.58 L/sec    UWW7988-%Pred-Pre 174 %    ExpTime-Pre 4.40 sec    FIFMax-Pre 3.19 L/sec    FEV1FEV6-Pred 80 %    FEV1FEV6-Pre 98 %   IgG    Collection Time: 01/10/24  " 9:48 AM   Result Value Ref Range    Immunoglobulin G 888 610 - 1,616 mg/dL   Basic metabolic panel    Collection Time: 01/10/24  9:48 AM   Result Value Ref Range    Sodium 137 135 - 145 mmol/L    Potassium 4.6 3.4 - 5.3 mmol/L    Chloride 98 98 - 107 mmol/L    Carbon Dioxide (CO2) 28 22 - 29 mmol/L    Anion Gap 11 7 - 15 mmol/L    Urea Nitrogen 27.6 (H) 8.0 - 23.0 mg/dL    Creatinine 3.76 (H) 0.51 - 0.95 mg/dL    GFR Estimate 13 (L) >60 mL/min/1.73m2    Calcium 9.4 8.8 - 10.2 mg/dL    Glucose 79 70 - 99 mg/dL   Magnesium    Collection Time: 01/10/24  9:48 AM   Result Value Ref Range    Magnesium 2.3 1.7 - 2.3 mg/dL   CBC with platelets    Collection Time: 01/10/24  9:48 AM   Result Value Ref Range    WBC Count 5.4 4.0 - 11.0 10e3/uL    RBC Count 2.67 (L) 3.80 - 5.20 10e6/uL    Hemoglobin 9.8 (L) 11.7 - 15.7 g/dL    Hematocrit 31.8 (L) 35.0 - 47.0 %     (H) 78 - 100 fL    MCH 36.7 (H) 26.5 - 33.0 pg    MCHC 30.8 (L) 31.5 - 36.5 g/dL    RDW 14.2 10.0 - 15.0 %    Platelet Count 226 150 - 450 10e3/uL     PFT interpretation:  Maneuver: valid and met ATS guidelines  Moderate obstruction based on Z score  Compared to prior: FEV1 of 1.10 is 330 ml below prior  Decreased in FVC may be related to air trapping or restriction; lung volumes would be necessary to determine

## 2024-01-10 ENCOUNTER — ANCILLARY PROCEDURE (OUTPATIENT)
Dept: CT IMAGING | Facility: CLINIC | Age: 64
End: 2024-01-10
Attending: PHYSICIAN ASSISTANT
Payer: MEDICARE

## 2024-01-10 ENCOUNTER — LAB (OUTPATIENT)
Dept: LAB | Facility: CLINIC | Age: 64
End: 2024-01-10
Payer: MEDICARE

## 2024-01-10 ENCOUNTER — TELEPHONE (OUTPATIENT)
Dept: TRANSPLANT | Facility: CLINIC | Age: 64
End: 2024-01-10

## 2024-01-10 ENCOUNTER — OFFICE VISIT (OUTPATIENT)
Dept: PULMONOLOGY | Facility: CLINIC | Age: 64
End: 2024-01-10
Payer: MEDICARE

## 2024-01-10 ENCOUNTER — OFFICE VISIT (OUTPATIENT)
Dept: PULMONOLOGY | Facility: CLINIC | Age: 64
End: 2024-01-10
Attending: PHYSICIAN ASSISTANT
Payer: MEDICARE

## 2024-01-10 ENCOUNTER — ANCILLARY PROCEDURE (OUTPATIENT)
Dept: GENERAL RADIOLOGY | Facility: CLINIC | Age: 64
End: 2024-01-10
Payer: MEDICARE

## 2024-01-10 VITALS
RESPIRATION RATE: 17 BRPM | WEIGHT: 104 LBS | HEART RATE: 90 BPM | HEIGHT: 62 IN | DIASTOLIC BLOOD PRESSURE: 73 MMHG | SYSTOLIC BLOOD PRESSURE: 142 MMHG | OXYGEN SATURATION: 96 % | BODY MASS INDEX: 19.14 KG/M2

## 2024-01-10 DIAGNOSIS — Z94.2 S/P LUNG TRANSPLANT (H): Primary | ICD-10-CM

## 2024-01-10 DIAGNOSIS — N18.6 ESRD (END STAGE RENAL DISEASE) (H): ICD-10-CM

## 2024-01-10 DIAGNOSIS — Z94.2 S/P LUNG TRANSPLANT (H): ICD-10-CM

## 2024-01-10 DIAGNOSIS — D84.9 IMMUNOSUPPRESSED STATUS (H): ICD-10-CM

## 2024-01-10 DIAGNOSIS — Z94.2 LUNG REPLACED BY TRANSPLANT (H): ICD-10-CM

## 2024-01-10 DIAGNOSIS — Z00.6 RESEARCH STUDY PATIENT: ICD-10-CM

## 2024-01-10 LAB
ANION GAP SERPL CALCULATED.3IONS-SCNC: 11 MMOL/L (ref 7–15)
BUN SERPL-MCNC: 27.6 MG/DL (ref 8–23)
CALCIUM SERPL-MCNC: 9.4 MG/DL (ref 8.8–10.2)
CHLORIDE SERPL-SCNC: 98 MMOL/L (ref 98–107)
CMV DNA SPEC NAA+PROBE-ACNC: NOT DETECTED IU/ML
CREAT SERPL-MCNC: 3.76 MG/DL (ref 0.51–0.95)
DEPRECATED HCO3 PLAS-SCNC: 28 MMOL/L (ref 22–29)
EGFRCR SERPLBLD CKD-EPI 2021: 13 ML/MIN/1.73M2
ERYTHROCYTE [DISTWIDTH] IN BLOOD BY AUTOMATED COUNT: 14.2 % (ref 10–15)
EXPTIME-PRE: 4.4 SEC
FEF2575-%PRED-PRE: 174 %
FEF2575-PRE: 3.58 L/SEC
FEF2575-PRED: 2.06 L/SEC
FEFMAX-%PRED-PRE: 69 %
FEFMAX-PRE: 4.21 L/SEC
FEFMAX-PRED: 6.09 L/SEC
FEV1-%PRED-PRE: 49 %
FEV1-PRE: 1.1 L
FEV1FEV6-PRE: 98 %
FEV1FEV6-PRED: 80 %
FEV1FVC-PRE: 98 %
FEV1FVC-PRED: 80 %
FIFMAX-PRE: 3.19 L/SEC
FVC-%PRED-PRE: 39 %
FVC-PRE: 1.12 L
FVC-PRED: 2.79 L
GLUCOSE SERPL-MCNC: 79 MG/DL (ref 70–99)
HCT VFR BLD AUTO: 31.8 % (ref 35–47)
HGB BLD-MCNC: 9.8 G/DL (ref 11.7–15.7)
IGG SERPL-MCNC: 888 MG/DL (ref 610–1616)
MAGNESIUM SERPL-MCNC: 2.3 MG/DL (ref 1.7–2.3)
MCH RBC QN AUTO: 36.7 PG (ref 26.5–33)
MCHC RBC AUTO-ENTMCNC: 30.8 G/DL (ref 31.5–36.5)
MCV RBC AUTO: 119 FL (ref 78–100)
PLATELET # BLD AUTO: 226 10E3/UL (ref 150–450)
POTASSIUM SERPL-SCNC: 4.6 MMOL/L (ref 3.4–5.3)
RBC # BLD AUTO: 2.67 10E6/UL (ref 3.8–5.2)
SODIUM SERPL-SCNC: 137 MMOL/L (ref 135–145)
TACROLIMUS BLD-MCNC: 6.1 UG/L (ref 5–15)
TME LAST DOSE: NORMAL H
TME LAST DOSE: NORMAL H
WBC # BLD AUTO: 5.4 10E3/UL (ref 4–11)

## 2024-01-10 PROCEDURE — 83735 ASSAY OF MAGNESIUM: CPT | Performed by: PATHOLOGY

## 2024-01-10 PROCEDURE — 99215 OFFICE O/P EST HI 40 MIN: CPT | Mod: 25 | Performed by: PHYSICIAN ASSISTANT

## 2024-01-10 PROCEDURE — 85027 COMPLETE CBC AUTOMATED: CPT | Performed by: PATHOLOGY

## 2024-01-10 PROCEDURE — G1010 CDSM STANSON: HCPCS | Performed by: RADIOLOGY

## 2024-01-10 PROCEDURE — G0463 HOSPITAL OUTPT CLINIC VISIT: HCPCS | Performed by: PHYSICIAN ASSISTANT

## 2024-01-10 PROCEDURE — 86833 HLA CLASS II HIGH DEFIN QUAL: CPT | Performed by: PHYSICIAN ASSISTANT

## 2024-01-10 PROCEDURE — 71046 X-RAY EXAM CHEST 2 VIEWS: CPT | Performed by: RADIOLOGY

## 2024-01-10 PROCEDURE — 86832 HLA CLASS I HIGH DEFIN QUAL: CPT | Performed by: PHYSICIAN ASSISTANT

## 2024-01-10 PROCEDURE — 99000 SPECIMEN HANDLING OFFICE-LAB: CPT | Performed by: PATHOLOGY

## 2024-01-10 PROCEDURE — 80197 ASSAY OF TACROLIMUS: CPT | Performed by: PHYSICIAN ASSISTANT

## 2024-01-10 PROCEDURE — 80048 BASIC METABOLIC PNL TOTAL CA: CPT | Performed by: PATHOLOGY

## 2024-01-10 PROCEDURE — 87799 DETECT AGENT NOS DNA QUANT: CPT | Performed by: PHYSICIAN ASSISTANT

## 2024-01-10 PROCEDURE — 82784 ASSAY IGA/IGD/IGG/IGM EACH: CPT | Performed by: PHYSICIAN ASSISTANT

## 2024-01-10 PROCEDURE — 94375 RESPIRATORY FLOW VOLUME LOOP: CPT | Performed by: PHYSICIAN ASSISTANT

## 2024-01-10 PROCEDURE — 36415 COLL VENOUS BLD VENIPUNCTURE: CPT | Performed by: PATHOLOGY

## 2024-01-10 PROCEDURE — 86352 CELL FUNCTION ASSAY W/STIM: CPT | Performed by: PHYSICIAN ASSISTANT

## 2024-01-10 PROCEDURE — 71250 CT THORAX DX C-: CPT | Mod: MG | Performed by: RADIOLOGY

## 2024-01-10 ASSESSMENT — PAIN SCALES - GENERAL: PAINLEVEL: NO PAIN (0)

## 2024-01-10 NOTE — LETTER
1/10/2024         RE: Sofie Rodriguez  [address] [place] [address]        Dear Colleague,    Thank you for referring your patient, Sofie Rodriguez, to the [facility] [place]. Please see a copy of my visit note below.    [facility]  January 10, 2024         Assessment and Plan:   Sofie Rodriguez is a 63 year old female with h/o bilateral lung transplant for COPD on 6/28/22 with course complicated by post-operative hemidiaphragm palsy, persistent pleural effusions, recurrent PNAs, positive DSA, EBV viremia, hypogammaglobulinemia, severe gastroparesis s/p G/J tube placement 7/27/22 with pyloric botox 1/25/23, GI bleed 2/2 pyloric ulcer, hemobilia s/p ERCP and MRCP, chronic diarrhea, recurrent C diff colitis, and ESRD on HD. PMH also notable for HFpEF, ASCVD, NTM colonization, hep C, and methamphetamine use. Admitted last May for intolerance of tube feedings with weight loss, neutropenia, and failure to thrive. Was supposed to be readmitted for FTT in July, but she eventually refused. Admitted to OSH 12/4-12/31 in December for right hip fracture s/p ORIF, now with significant deconditioning. This is her first post discharge follow up.     1. S/p bilateral lung transplant:   Right hemidiaphragm palsy:   Suspected CARLEE: last seen in August, was doing minimal walking prior to her fall in December, denies new pulmonary complaints. Declined post surgery PT and her activity now if very limited after a month in the hospital. Sating 96% on room air. CMV 7/25 negative. ImmuKnow assay 73 on 5/18, indicative of increased risk of infection, down to 43 on recheck in June. CXR reviewed today and demonstrates slight increase in left effusion. PFTs down 320 ml today, significantly below her baseline (1.4-1.5L). CT chest completed after the visit today reviewed by me and demonstrates stable left upper posterior  opacity, though to be atelectasis based on prior conversations with few patchy areas of LLL.   - Recheck Immunknow, AZA has been on hold for many months given low values prior  - Check cf DNA  - Schedule follow up with sleep to discuss possible CPAP, given recommendations from August sleep study  - Continue IS including tacrolimus (goal 8-12) and prednisone  - Dapsone qMWF for PJP ppx  - Start pulm rehab for lungs and hip  - See patient back in one month with possible bronch/BAL, biopsies (C4D staining)?     2. Positive DSA: no prior treatment for AMR, has been watched for quite some time given no pulmonary symptoms, prior PFTs stability and significant and ongoing failure to thrive. Last DSA down to 6866 (DQB2), had peaked in fall of 2022.   - DSA pending, will consider AMR treatment      3. EBV viremia: last level of 24K (log 4.4) on 7/25.   - EBV pending for today     4. Severe malnutrition:  FTT:   Gastroparesis s/p PEG/J and botox:   S/p G-POEM  Pyloric ulcer:  Chronic nausea and osmotic diarrhea:  SIBO s/p rifaximin:   Recurrent C diff colitis: chronic diarrhea since transplant with recurrent episodes of C diff. GI consulted, felt stools consistent with osmotic diarrhea. Transitioned to continuous TF, able to tolerate and now unable to tolerate bolus TF. Last summer declined readmission. Following with Dr. Navarro, ongoing nausea and inability to tolerate food or TF (getting in about 3-6 cans FV per week, supposed to be getting in 4 cans TF per day). Has gastric emptying study scheduled for next Monday.   - Continue imodium, pantoprazole, TF    5. CKD:   HTN: BP overall controlled. Doing dialysis T/Th/Sat. Transplant pre evaluation is currently on hold for kidney until we can improve her GI issues/nutrition/weight gain and now her pulmonary function.     6. Acute anemia: suspect related to recent surgery/admission. Hgb was in the mid 8s at OSH, so 9.8 today improved. Monitor.     Excluding time spent reading  "PFTs, I have spent > 60 minutes on the day of encounter reviewing the patient's chart including OSH notes, vitals, labs, imaging, and medications and formulating plan of care with the patient and coordinator.     RTC: 1 month  Vaccinations: got her flu, covid and RSV vaccine  Annual dermatology visit: following Derm   Preventative: DEXA > June 2025; colonoscopy completed March 2023 (diverticulosis in the sigmoid colon and colonic polyp noted s/p polypectomy)--needs repeat in 2026 per notes    Kaylin Triana PA-C  Pulmonary, Allergy, Critical Care and Sleep Medicine        Interval History:     Prior to her admission in December, patient was doing okay. Was doing some minor exercises at home with some walking 2 blockers. Currently has a wheeled walker, declined doing PT when she left the hospital. Denies recent lung complaints, no fever or chills. No nasal or sinus congestion, no drainage. Minimal cough, no congestion or tightness. No new or unexpected shortness of breath. No chest pain or palpitations. Nausea is still present, not improved on the feeding tube or regular tube. Has a gastric emptying study on Monday. No vomiting. Doing only one carton of TF about 3-6 days per week (is supposed to be getting in 4 cartons seven days per week). No abdominal pain, stools are normal 1/2 the time and the other 1/2 it's \"mush or diarrhea.\"           Review of Systems:   Please see HPI, otherwise the complete 10 point ROS is negative.           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    Hepatitis 2017    Hep C, [facility]    History of blood transfusion     HTN (hypertension)     Infectious mononucleosis     Lung infection 11/30/2022    Osteopenia      Past Surgical History: "   Procedure Laterality Date    BRONCHOSCOPY (RIGID OR FLEXIBLE), DIAGNOSTIC N/A 08/02/2022    Procedure: BRONCHOSCOPY, DIAGNOSTIC- inspection Bronch;  Surgeon: Kamala Lovell MD;  Location: [facility]    BRONCHOSCOPY (RIGID OR FLEXIBLE), DIAGNOSTIC N/A 09/13/2022    Procedure: INSPECTION BRONCHOSCOPY, WITH BRONCHOALVEOLAR LAVAGE;  Surgeon: Jose R Mccullough MD;  Location: [facility]    BRONCHOSCOPY (RIGID OR FLEXIBLE), DIAGNOSTIC N/A 11/09/2022    Procedure: BRONCHOSCOPY, WITH BRONCHOALVEOLAR LAVAGE AND BIOPSY;  Surgeon: Cesar Lima MD;  Location: [facility]    BRONCHOSCOPY (RIGID OR FLEXIBLE), DIAGNOSTIC N/A 01/25/2023    Procedure: BRONCHOSCOPY, WITH BRONCHOALVEOLAR LAVAGE AND BIOPSY;  Surgeon: Mason Reddy MD;  Location: [facility]    BRONCHOSCOPY (RIGID OR FLEXIBLE), DIAGNOSTIC N/A 04/19/2023    Procedure: BRONCHOSCOPY, WITH BRONCHOALVEOLAR LAVAGE AND BIOPSY;  Surgeon: Kamala Lovell MD;  [facility]    BRONCHOSCOPY (RIGID OR FLEXIBLE), DIAGNOSTIC N/A 07/12/2023    Procedure: BRONCHOSCOPY, WITH BRONCHOALVEOLAR LAVAGE AND BIOPSY;  Surgeon: Cesar Lima MD;  [facility]    BRONCHOSCOPY FLEXIBLE AND RIGID N/A 07/19/2022    Procedure: BRONCHOSCOPY inspection only;  Surgeon: Bob Liao MD;  Location: [facility]    COLONOSCOPY  2015    CORONARY ANGIOGRAPHY ADULT ORDER      CV CORONARY ANGIOGRAM N/A 06/30/2021    Procedure: CV CORONARY ANGIOGRAM;  Surgeon: Alexander Cuellar MD;  Location:  HEART CARDIAC CATH LAB    CV RIGHT HEART CATH MEASUREMENTS RECORDED N/A 06/30/2021    Procedure: CV RIGHT HEART CATH;  Surgeon: Alexander Cuellar MD;  Location:  HEART CARDIAC CATH LAB    ENDOSCOPIC PERORAL MYOTOMY N/A 10/09/2023    Procedure: MYOTOMY, ESOPHAGUS, ENDOSCOPIC, ORAL APPROACH;  Surgeon: Gonzalez Navarro MD;  [facility]    ENDOSCOPIC RETROGRADE CHOLANGIOPANCREATOGRAM N/A 08/11/2022    Procedure: ENDOSCOPIC RETROGRADE CHOLANGIOPANCREATOGRAPHY WITH PANCREATIC DUCT NEEDLE KNIFE AND STENT PLACEMENT,  BILE DUCT SPHINCTEROTOMY, BLOOD/DEBRIS REMOVAL AND STENT PLACEMENT;  Surgeon: Cosmo Arroyo MD;  [facility]    ENDOSCOPIC RETROGRADE CHOLANGIOPANCREATOGRAM N/A 10/07/2022    Procedure: ENDOSCOPIC RETROGRADE CHOLANGIOPANCREATOGRAPHY with biliary and pancreatic stent removal, debris removal;  Surgeon: Cosmo Arroyo MD;  [facility]: [facility]    ENT SURGERY  1974    tonsillectomy    ENTEROSCOPY SMALL BOWEL N/A 08/11/2022    Procedure: SMALL BOWEL ENTEROSCOPY;  Surgeon: Cosmo Arroyo MD;  [facility]    ESOPHAGOGASTRODUODENOSCOPY, WITH NASOGASTRIC TUBE INSERTION N/A 07/01/2022    Procedure: ESOPHAGOGASTRODUODENOSCOPY, WITH NASOJEJUNAL TUBE INSERTION;  Surgeon: Ozzy Nickerson MD;  Location:  GI    ESOPHAGOSCOPY, GASTROSCOPY, DUODENOSCOPY (EGD), COMBINED N/A 08/03/2022    Procedure: ESOPHAGOGASTRODUODENOSCOPY (EGD);  Surgeon: Ira Andres MD;  Location:  GI    ESOPHAGOSCOPY, GASTROSCOPY, DUODENOSCOPY (EGD), COMBINED N/A 01/25/2023    Procedure: ESOPHAGOGASTRODUODENOSCOPY (EGD) with botox injection;  Surgeon: Gonzalez Navarro MD;  Location:  GI    ESOPHAGOSCOPY, GASTROSCOPY, DUODENOSCOPY (EGD), COMBINED N/A 10/09/2023    Procedure: ESOPHAGOGASTRODUODENOSCOPY;  Surgeon: Gonzalez Navarro MD;  Location:  OR    HAND SURGERY      INSERT CHEST TUBE Right 09/13/2022    Procedure: Insert chest tube;  Surgeon: Jose R Mccullough MD;  Location:  GI    IR CVC TUNNEL PLACEMENT > 5 YRS OF AGE  09/26/2022    IR GASTRO JEJUNOSTOMY TUBE CHANGE  08/31/2022    IR GASTRO JEJUNOSTOMY TUBE CHANGE  12/21/2022    IR GASTRO JEJUNOSTOMY TUBE CHANGE  07/12/2023    IR GASTRO JEJUNOSTOMY TUBE CHANGE  08/18/2023    IR GASTRO JEJUNOSTOMY TUBE CHANGE  11/14/2023    IR GASTRO JEJUNOSTOMY TUBE PLACEMENT  07/27/2022    IR THORACENTESIS  08/29/2022    LEEP TX, CERVICAL  04/07/2017    HECTOR III    LYMPH NODE BIOPSY Left 2005    Left axilla, benign- [place]    MIDLINE INSERTION - DOUBLE LUMEN Left  07/28/2022    20cm, Basilic vein    REPLACE GASTROJEJUNOSTOMY TUBE, PERCUTANEOUS  10/07/2022    Procedure: Replace Gastrojejunostomy Tube;  Surgeon: Cosmo Arroyo MD;  [facility]    THORACENTESIS Left 08/29/2022    Procedure: THORACENTESIS;  Surgeon: Bo Capone PA-C;  Location: [facility]    THORACENTESIS Left 09/13/2022    Procedure: Thoracentesis;  Surgeon: Jose R Mccullough MD;  [facility]: [facility]    THROMBECTOMY UPPER EXTREMITY Left 07/02/2022    Procedure: LEFT RADIAL ARM THROMBECTOMY;  Surgeon: Christie Graham MD;  [facility]    TRANSPLANT LUNG RECIPIENT SINGLE X2 Bilateral 06/28/2022    Procedure: Clamshell Incision, Bilateral Sequential Lung Transplant, On Cardiopulmonary Bypass, Flexible Bronchoscopy;  Surgeon: Sue Sunshine MD;  [facility]           Family History:     No family history on file.         Social History:     Social History     Socioeconomic History    Marital status:      Spouse name: Not on file    Number of children: Not on file    Years of education: Not on file    Highest education level: Not on file   Occupational History    Not on file   Tobacco Use    Smoking status: Former     Packs/day: 0.50     Years: 30.00     Additional pack years: 0.00     Total pack years: 15.00     Types: Cigarettes     Quit date: 11/11/2020     Years since quitting: 3.1    Smokeless tobacco: Never   Substance and Sexual Activity    Alcohol use: Not Currently     Comment: Stopped drinking in 2020    Drug use: Not Currently     Types: Marijuana, Methamphetamines     Comment: hx:marijuana and methamphetamine-quit both unsure ?  2-3 yrs ago    Sexual activity: Not on file   Other Topics Concern    Parent/sibling w/ CABG, MI or angioplasty before 65F 55M? Not Asked   Social History Narrative    Not on file     Social Determinants of Health     Financial Resource Strain: Not on file   Food Insecurity: Not on file   Transportation Needs: Not on file   Physical  "Activity: Not on file   Stress: Not on file   Social Connections: Not on file   Interpersonal Safety: Not At Risk (9/1/2023)    Humiliation, Afraid, Rape, and Kick questionnaire     Fear of Current or Ex-Partner: No     Emotionally Abused: No     Physically Abused: No     Sexually Abused: No   Housing Stability: Not on file            Medications:     Current Outpatient Medications   Medication    azaTHIOprine (IMURAN) 5 mg/mL SUSP    B Complex-C-Folic Acid (DIALYVITE) TABS    Calcium Carbonate-Vitamin D 600-10 MG-MCG TABS    dapsone 2 mg/mL SUSP    loperamide (IMODIUM A-D) 2 MG tablet    metoprolol tartrate (LOPRESSOR) 50 MG tablet    multivitamin (CENTRUM SILVER) tablet    pantoprazole (PROTONIX) 2 mg/mL SUSP suspension    predniSONE (DELTASONE) 5 MG tablet    protein modular (PROSOURCE TF) LIQD    sevelamer carbonate, RENVELA, 0.8 GM PACK Packet    tacrolimus (GENERIC) 1 mg/mL suspension    vitamin B-12 (CYANOCOBALAMIN) 500 MCG tablet     No current facility-administered medications for this visit.            Physical Exam:   BP (!) 142/73   Pulse 90   Resp 17   Ht 1.575 m (5' 2\")   Wt 47.2 kg (104 lb)   SpO2 96%   BMI 19.02 kg/m      GENERAL: alert, NAD  HEENT: NCAT, EOMI, no scleral icterus, oral mucosa moist and without lesions  Neck: no cervical or supraclavicular adenopathy  Lungs: moderate air flow, scattered basilar crackles  CV: RRR, S1S2, + murmur  Abdomen: normoactive BS, soft, non tender  Lymph: no edema  Neuro: AAO X 3, CN 2-12 grossly intact  Psychiatric: normal affect, good eye contact  Skin: no rash, jaundice or lesions on limited exam         Data:   All laboratory and imaging data reviewed.      Recent Results (from the past 168 hour(s))   General PFT Lab (Please always keep checked)    Collection Time: 01/10/24  8:43 AM   Result Value Ref Range    FVC-Pred 2.79 L    FVC-Pre 1.12 L    FVC-%Pred-Pre 39 %    FEV1-Pre 1.10 L    FEV1-%Pred-Pre 49 %    FEV1FVC-Pred 80 %    FEV1FVC-Pre 98 %    " "FEFMax-Pred 6.09 L/sec    FEFMax-Pre 4.21 L/sec    FEFMax-%Pred-Pre 69 %    FEF2575-Pred 2.06 L/sec    FEF2575-Pre 3.58 L/sec    PXE5699-%Pred-Pre 174 %    ExpTime-Pre 4.40 sec    FIFMax-Pre 3.19 L/sec    FEV1FEV6-Pred 80 %    FEV1FEV6-Pre 98 %   IgG    Collection Time: 01/10/24  9:48 AM   Result Value Ref Range    Immunoglobulin G 888 610 - 1,616 mg/dL   Basic metabolic panel    Collection Time: 01/10/24  9:48 AM   Result Value Ref Range    Sodium 137 135 - 145 mmol/L    Potassium 4.6 3.4 - 5.3 mmol/L    Chloride 98 98 - 107 mmol/L    Carbon Dioxide (CO2) 28 22 - 29 mmol/L    Anion Gap 11 7 - 15 mmol/L    Urea Nitrogen 27.6 (H) 8.0 - 23.0 mg/dL    Creatinine 3.76 (H) 0.51 - 0.95 mg/dL    GFR Estimate 13 (L) >60 mL/min/1.73m2    Calcium 9.4 8.8 - 10.2 mg/dL    Glucose 79 70 - 99 mg/dL   Magnesium    Collection Time: 01/10/24  9:48 AM   Result Value Ref Range    Magnesium 2.3 1.7 - 2.3 mg/dL   CBC with platelets    Collection Time: 01/10/24  9:48 AM   Result Value Ref Range    WBC Count 5.4 4.0 - 11.0 10e3/uL    RBC Count 2.67 (L) 3.80 - 5.20 10e6/uL    Hemoglobin 9.8 (L) 11.7 - 15.7 g/dL    Hematocrit 31.8 (L) 35.0 - 47.0 %     (H) 78 - 100 fL    MCH 36.7 (H) 26.5 - 33.0 pg    MCHC 30.8 (L) 31.5 - 36.5 g/dL    RDW 14.2 10.0 - 15.0 %    Platelet Count 226 150 - 450 10e3/uL     PFT interpretation:  Maneuver: valid and met ATS guidelines  Moderate obstruction based on Z score  Compared to prior: FEV1 of 1.10 is 330 ml below prior  Decreased in FVC may be related to air trapping or restriction; lung volumes would be necessary to determine      Transplant Coordinator Note    Reason for visit: Post lung transplant follow up visit   Coordinator: Present   Caregiver:  None    Health concerns addressed today:  1. Resp: Was using oxygen while in the hospital for roughly a week per patient reported. Discharged on room air. PFTs down. Has not been \"lung sick\" since last seen in clinic. Coughing every other day, no chest " congestion. Denies feeling SOB.   2. ENT: at baseline   3. GI: Still feels nauseous, not improved. Repeat gastric emptying study on Monday. Doing 1 carton of TF daily, ideally should be doing 4 cartons daily, pt thinks she is getting in 3-6 cartons a week. Gastric emptying study again on Monday, follow up visit with Dr. Navarro on 1/24/24.   4. Pt broke hip recently, hospitalized 12/4-12/31/24. Pt had a fall.   5. Not a kidney tx candidate at this point. Doing dialysis T/TH/Sat right now.     Admitted to [facility] on 12/4/2023 Discharged on 12/31/2023  Reason for hospitalization: closed displaced fracture of right femoral neck     Activity/rehab: Stopped walking once it got cold outside. Using wheeled walker since she broke her hip. Not doing any therapy right now. Pt didn't want to do therapy outpatient because it's hard to get rides.   Oxygen needs: RA   Pain management/RX:   Diabetic management:   Next Bronch due:   High risk donor:   Risk Criteria Labs:   CMV status:  Valcyte stopped:   EBV status:  DVT/PE:  Post op AFIB/follow up with EP:  AC/asa:   PJP prophylactic:     COVID:  COVID-19 infection (yes/no, date of most recent positive test):   Status/instructions given about COVID-19 vaccine:     Pt Education: medications (use/dose/side effects), how/when to call coordinator, frequency of labs, s/s of infection/rejection, call prior to starting any new medications, lab/vital sign book    Health Maintenance:   Last colonoscopy:   Next colonoscopy due:   Dermatology:  Vaccinations this visit: up to date with covid, influenza, RSV     Labs, CXR, PFTs reviewed with patient  Medication record reviewed and reconciled  Questions and concerns addressed    Patient Instructions  1. Continue to hydrate with 60-70 oz fluids daily.  2. Continue to exercise daily or most days of the week.  3. Follow up with your primary care provider for annual gender health maintenance procedures.  4. Follow up with colonoscopy  schedule.  5. Follow up with annual dermatology visits.  6. It doesn't seem like the COVID vaccine is working well in lung transplant patients. A number of lung transplant patients have gotten sick with COVID even after receiving the vaccines. Based on our recent experience, it can be life-threatening to get COVID  even after being vaccinated. Please continue to act like you did not get the COVID vaccine - social distancing, wearing a mask, good hand hygiene, etc. If the people around you are vaccinated, it will help reduce the risk of you getting COVID. All members of your household should be vaccinated.  7. [facility] will fax orders for pulmonary rehab   8. Chest CT today   9. Call sleep medicine clinic and tell them their recommendations are for you to do a trial of autotitrating CPAP.     Next transplant clinic appointment:  3-4 weeks with CXR, labs and PFTs  Next lab draw: pending tacrolimus level     AVS printed at time of check out        Again, thank you for allowing me to participate in the care of your patient.        Sincerely,        Kaylin Triana PA-C

## 2024-01-10 NOTE — PROGRESS NOTES
Sofie DIANE Rodriguez comes into clinic today at the request of Kaylin Triana PA-C Ordering Provider for spirometry         This service provided today was under the supervising provider of the day Kaylin Triana PA-C, who was available if needed.    Vik Leslie, RRT

## 2024-01-10 NOTE — PROGRESS NOTES
"Transplant Coordinator Note    Reason for visit: Post lung transplant follow up visit   Coordinator: Present   Caregiver:  None    Health concerns addressed today:  1. Resp: Was using oxygen while in the hospital for roughly a week per patient reported. Discharged on room air. PFTs down. Has not been \"lung sick\" since last seen in clinic. Coughing every other day, no chest congestion. Denies feeling SOB.   2. ENT: at baseline   3. GI: Still feels nauseous, not improved. Repeat gastric emptying study on Monday. Doing 1 carton of TF daily, ideally should be doing 4 cartons daily, pt thinks she is getting in 3-6 cartons a week. Gastric emptying study again on Monday, follow up visit with Dr. Navarro on 1/24/24.   4. Pt broke hip recently, hospitalized 12/4-12/31/24. Pt had a fall.   5. Not a kidney tx candidate at this point. Doing dialysis T/TH/Sat right now.     Admitted to [facility] on 12/4/2023 Discharged on 12/31/2023  Reason for hospitalization: closed displaced fracture of right femoral neck     Activity/rehab: Stopped walking once it got cold outside. Using wheeled walker since she broke her hip. Not doing any therapy right now. Pt didn't want to do therapy outpatient because it's hard to get rides.   Oxygen needs: RA   Pain management/RX:   Diabetic management:   Next Bronch due:   High risk donor:   Risk Criteria Labs:   CMV status:  Valcyte stopped:   EBV status:  DVT/PE:  Post op AFIB/follow up with EP:  AC/asa:   PJP prophylactic:     COVID:  COVID-19 infection (yes/no, date of most recent positive test):   Status/instructions given about COVID-19 vaccine:     Pt Education: medications (use/dose/side effects), how/when to call coordinator, frequency of labs, s/s of infection/rejection, call prior to starting any new medications, lab/vital sign book    Health Maintenance:   Last colonoscopy:   Next colonoscopy due:   Dermatology:  Vaccinations this visit: up to date with covid, influenza, RSV "     Labs, CXR, PFTs reviewed with patient  Medication record reviewed and reconciled  Questions and concerns addressed    Patient Instructions  1. Continue to hydrate with 60-70 oz fluids daily.  2. Continue to exercise daily or most days of the week.  3. Follow up with your primary care provider for annual gender health maintenance procedures.  4. Follow up with colonoscopy schedule.  5. Follow up with annual dermatology visits.  6. It doesn't seem like the COVID vaccine is working well in lung transplant patients. A number of lung transplant patients have gotten sick with COVID even after receiving the vaccines. Based on our recent experience, it can be life-threatening to get COVID  even after being vaccinated. Please continue to act like you did not get the COVID vaccine - social distancing, wearing a mask, good hand hygiene, etc. If the people around you are vaccinated, it will help reduce the risk of you getting COVID. All members of your household should be vaccinated.  7. [facility] will fax orders for pulmonary rehab   8. Chest CT today   9. Call sleep medicine clinic and tell them their recommendations are for you to do a trial of autotitrating CPAP.     Next transplant clinic appointment:  3-4 weeks with CXR, labs and PFTs  Next lab draw: pending tacrolimus level     AVS printed at time of check out

## 2024-01-10 NOTE — PATIENT INSTRUCTIONS
Patient Instructions  1. Continue to hydrate with 60-70 oz fluids daily.  2. Continue to exercise daily or most days of the week.  3. Follow up with your primary care provider for annual gender health maintenance procedures.  4. Follow up with colonoscopy schedule.  5. Follow up with annual dermatology visits.  6. It doesn't seem like the COVID vaccine is working well in lung transplant patients. A number of lung transplant patients have gotten sick with COVID even after receiving the vaccines. Based on our recent experience, it can be life-threatening to get COVID  even after being vaccinated. Please continue to act like you did not get the COVID vaccine - social distancing, wearing a mask, good hand hygiene, etc. If the people around you are vaccinated, it will help reduce the risk of you getting COVID. All members of your household should be vaccinated.  7. [facility] will fax orders for pulmonary rehab   8. Chest CT today   9. Call sleep medicine clinic and tell them their recommendations are for you to do a trial of autotitrating CPAP.     Next transplant clinic appointment:  3-4 weeks with CXR, labs and PFTs  Next lab draw: pending tacrolimus level     ~~~~~~~~~~~~~~~~~~~~~~~~~    Thoracic Transplant Office phone [phone], fax [phone]    Office Hours 8:30 - 5:00     For after-hours urgent issues, please dial [phone] and ask the  to page the Thoracic Transplant Coordinator On-Call.   --------------------  To expedite your medication refill(s), please contact your pharmacy and have them fax a refill request to: [phone]    *Please allow 3 business days for routine medication refills.  *Please allow 5 business days for controlled substance medication refills.    **For Diabetic medications and supplies refill(s), please contact your pharmacy and have them contact your Endocrine team.  --------------------  For scheduling appointments call [phone].  --------------------  Please Note: If  you are active on [employer], all future test results will be sent by [employer] message only, and will no longer be called to patient. You may also receive communication directly from your physician.

## 2024-01-10 NOTE — LETTER
PHYSICIAN ORDERS      DATE & TIME ISSUED: 2024 9:00 AM  PATIENT NAME: Sofie Rodriguez   : 1960     Formerly [facility] MR# [if applicable]: [id number]     DIAGNOSIS:  Lung Transplant  Z94.2    Order for pulmonary rehab twice weekly or whatever insurance will cover.   Please call patient to schedule.     Any questions please call: [facility] [phone]    Please fax these results to [phone].        Kaylin Triana PA-C    PATIENT DEMOGRAPHICS:   Name: Sofie Rodriguez MRN: [id number]   Address: [address] [place] [address] Sex: Female   City/St/Zip: [place] [address] : 1960   Home Phone: [phone] Work Phone:     [facility]: [place] Cell Phone: [phone]       EMERGENCY CONTACT:  Contact Name: Julia Jackson Relationship: Daughter   Home Phone: [phone] Work Phone:         Cell Phone: [phone]      GUARANTOR INFORMATION: Relationship to Patient: Self  Name: Sofie Rodriguez       Address: [address] [place] [address]  Account Type: Personal/family   City/St/[place] [address] Employer:     Home Phone: [phone] Work Phone:          COVERAGE INFORMATION:  Primary Payor: Medicare Plan: Medicare   Subscriber: Sofie Rodriguez Group #:     Subscriber Sex: Female       Subscriber : 1960 Patient Rel'ship: Self   Subscriber Effective Date:   Member Effective Date: 10/1/2017    Subscriber ID [id number] Member ID: [id number]      Secondary Payor: [facility] Plan: [facility]   Subscriber: Braulio Rodriguezalex CONTRERAS Group #: [id number]   Subscriber Sex: Female       Subscriber : 1960 Patient Rel'ship: Self   Subscriber Effective Date:   Member Effective Date: 2022   Subscriber ID [id number] Member ID: [id number]      PROVIDER INFORMATION:  Referring Physician:   Referring Address:     Referring Phone: N/A Referring Fax:     Primary Physician: Vinny Berrios Primary Address: [address] [facility] [address]   Primary Phone: [phone] Primary Fax: [phone]

## 2024-01-10 NOTE — TELEPHONE ENCOUNTER
Chest CT reviewed by Kaylin Triana  -treat with renally dosed levaquin x 14 days   -plan for bronch with BAL after next clinic visit, will cancel if PFTs improved at next visit     Estimated Creatinine Clearance: 11.7 mL/min (A) (based on SCr of 3.76 mg/dL (H)).    Per [facility] pharmacist: 750 mg initial dose then 250 mg every 24 hours. On dialysis days, give after dialysis.   Pt given achilles tendon warning.       Tacrolimus level 6.1 at 14 hours on 1/10/24.  Goal 8-10  Dose increased to 0.4mg in AM and 0.5mg in PM   Recheck level in a week     Pulm rehab orders faxed to [facility]

## 2024-01-11 ENCOUNTER — HOSPITAL ENCOUNTER (OUTPATIENT)
Facility: CLINIC | Age: 64
End: 2024-01-11
Attending: INTERNAL MEDICINE | Admitting: INTERNAL MEDICINE
Payer: MEDICARE

## 2024-01-11 DIAGNOSIS — Z94.2 S/P LUNG TRANSPLANT (H): Primary | ICD-10-CM

## 2024-01-11 LAB
DONOR IDENTIFICATION: NORMAL
DQB2: 5723
DSA COMMENTS: NORMAL
DSA PRESENT: YES
DSA TEST METHOD: NORMAL
EBV DNA COPIES/ML, INSTRUMENT: ABNORMAL COPIES/ML
EBV DNA SPEC NAA+PROBE-LOG#: 4.9 {LOG_COPIES}/ML
ORGAN: NORMAL
SA 1 CELL: NORMAL
SA 1 TEST METHOD: NORMAL
SA 2 CELL: NORMAL
SA 2 TEST METHOD: NORMAL
SA1 HI RISK ABY: NORMAL
SA1 MOD RISK ABY: NORMAL
SA2 HI RISK ABY: NORMAL
SA2 MOD RISK ABY: NORMAL
UNACCEPTABLE ANTIGENS: NORMAL
UNOS CPRA: 37
ZZZSA 1  COMMENTS: NORMAL
ZZZSA 2 COMMENTS: NORMAL

## 2024-01-11 RX ORDER — LEVOFLOXACIN 250 MG/1
250 TABLET, FILM COATED ORAL DAILY
Qty: 16 TABLET | Refills: 0 | Status: SHIPPED | OUTPATIENT
Start: 2024-01-11 | End: 2024-02-07

## 2024-01-12 LAB — IMMUKNOW IMMUNE CELL FUNCTION: 108 NG/ML

## 2024-01-15 ENCOUNTER — HOSPITAL ENCOUNTER (OUTPATIENT)
Dept: NUCLEAR MEDICINE | Facility: CLINIC | Age: 64
Setting detail: NUCLEAR MEDICINE
Discharge: HOME | End: 2024-01-15
Attending: INTERNAL MEDICINE | Admitting: INTERNAL MEDICINE
Payer: MEDICARE

## 2024-01-15 DIAGNOSIS — Z94.2 S/P LUNG TRANSPLANT (H): ICD-10-CM

## 2024-01-15 DIAGNOSIS — K31.84 GASTROPARESIS: ICD-10-CM

## 2024-01-15 LAB — PROSPERA TRANSPLANT MONITORING: 0.12 %

## 2024-01-15 PROCEDURE — 343N000001 HC RX 343: Performed by: INTERNAL MEDICINE

## 2024-01-15 PROCEDURE — 78264 GASTRIC EMPTYING IMG STUDY: CPT | Mod: MG

## 2024-01-15 PROCEDURE — 78264 GASTRIC EMPTYING IMG STUDY: CPT | Mod: 26 | Performed by: RADIOLOGY

## 2024-01-15 PROCEDURE — G1010 CDSM STANSON: HCPCS

## 2024-01-15 PROCEDURE — G1010 CDSM STANSON: HCPCS | Performed by: RADIOLOGY

## 2024-01-15 PROCEDURE — A9541 TC99M SULFUR COLLOID: HCPCS | Performed by: INTERNAL MEDICINE

## 2024-01-15 RX ADMIN — Medication 2 MILLICURIE: at 13:30

## 2024-01-15 NOTE — TELEPHONE ENCOUNTER
Received a My Chart message today from patient who shared that she was told at her appt 01/10/2024 with Kaylin CAMARILLO, she is not a kidney transplant candidate at this time. Patient recommending she cancels her pre kidney transplant evaluation appts on 01/31/2024, will plan to reschedule when appropriate. Patient was hospitalized for several weeks, from December 4 through the 31st as a result of an injury causing a displaced fracture of the right femoral neck. At appt 01/10/2024 she was found to be physically deconditioned, worsened pulmonary function and severe malnutrition along with ongoing nausea.     Staff message sent to schedulers to cancel PKE appts 01/31/2024 with no need to reschedule.     Will wait to hear from patient when she is feeling better and able to reschedule pre kidney eval.

## 2024-01-16 DIAGNOSIS — Z94.2 S/P LUNG TRANSPLANT (H): ICD-10-CM

## 2024-01-17 ENCOUNTER — DOCUMENTATION ONLY (OUTPATIENT)
Dept: GASTROENTEROLOGY | Facility: CLINIC | Age: 64
End: 2024-01-17
Payer: MEDICARE

## 2024-01-17 NOTE — PROGRESS NOTES
DSA and prospera labs reviewed by Kaylin Triana:    I would leave her IS where it is for now. It does not look like rejection as she is over immunosuppressed based on immuknow and her cf DNA is < 1%. I would not rule out steroids if she is not improved when we see her back.     DSA is actually lower than it's been.     Let's plan to see her in a month (she has to start exercising), complete the empiric antibiotics and if not improved at that time, will plan possible bronch/lavage and/or steroids       Tentative bronch scheduled for after next clinic visit

## 2024-01-17 NOTE — PROGRESS NOTES
Called PT and confirmed Appointment.    Called to remind patient of their upcoming appointment with our GI clinic, on 1/24/24 at 9:00 AM with Dr. Gonzalez Navarro. This appointment is scheduled as a video visit. You will receive a call approximately 30 minutes prior to check you in, you must be in [place] for this visit., if your appointment is virtual (video or telephone) you need to be in [place] for the visit. To reschedule or cancel patient to call [phone].      SK

## 2024-01-18 ENCOUNTER — TELEPHONE (OUTPATIENT)
Dept: TRANSPLANT | Facility: CLINIC | Age: 64
End: 2024-01-18
Payer: MEDICARE

## 2024-01-18 NOTE — TELEPHONE ENCOUNTER
"Called Sofie per request of txp team. I last spoke with her in July 2023, but did not follow up due to planned admission (which did not appear to happen d/t bed shortage).   Overall her nutrition seems stable or slightly worse since then.     GI sx: nausea every day - not on anti nausea meds  No vomiting  Diarrhea has improved 50% since last time we spoke- pt unsure why   Does not notice any oil in stools, but very watery diarrhea     PO intake: reports getting full more quickly than in the past- all food and TF don't \"sit well\"  She eats a total of 1 \"meal\" per day- 1 meal consists of a boiled egg OR egg and slice of nino OR bowl of soup. This is consistent from when we spoke in July.     Fluid intake: at most 16 oz water/day     TF regimen: remains on 1 carton/day (3x/week) of Eneida [employer] 1.8. This provides 250 ml formula, 450 calories, 20 g protein. She runs it at 30 ml/hr for ~8 h. This is the max rate she has tolerated. She admits to not running her TF every day.     Historically she has been on Nepro, Novasource Renal, and Osmolite 1.5   She has many Eneida [employer] Renal still at home along with older formula.     Weight:   Now - 104 lbs, dropped wt while IP (December)- hip fx     Wt Readings from Last 10 Encounters:   01/10/24 47.2 kg (104 lb)   11/29/23 49 kg (108 lb)   11/07/23 49.4 kg (109 lb)   10/09/23 51.1 kg (112 lb 10.5 oz)   09/01/23 49.4 kg (109 lb)   08/02/23 52.2 kg (115 lb)   07/25/23 50.3 kg (111 lb)   05/23/23 57.3 kg (126 lb 4.8 oz)   03/01/23 64.4 kg (142 lb)   01/25/23 62.1 kg (136 lb 14.5 oz)     On dialysis 3x/week- 5-8 am     Exercise: reports moving around better, does PT exercises- arm lifts w/ 2 lb weights, leg lifts    Interventions:  - Reviewed goal with Sofie of improving nutrition & ideally gaining some weight.   - Recommend 1 week trial of elemental formula to test tolerance, but the calorie/protein provisions will be lower than current regimen due to a lower density formula. I told " Sofie I am unsure if this formula will make a significant different in GI sx and positively impact PO intakes, but worth a try.   - 1-2 cartons of Vivonex RTF will provide 250-500 ml formula, 250-500 sudha/day, 12-25 g protein/day. Currently she is running a TF cycle. Will talk w/ Sofie on trying continuous rate of 20 ml/hr x 24 h to get in 2 cans formula over 24 h.   - We did discuss TPN, but pt reports not overly interested in this.    - I will contact [facility] about orders for 1 week only. I will reach out to Sofie in ~10-14 days to see how she is doing.   - For txp team: ?anti emetics vs has this been done before w/o success. Plan to repeat fecal elastase per team? Level WNL 5/2023.

## 2024-01-19 ENCOUNTER — TELEPHONE (OUTPATIENT)
Dept: TRANSPLANT | Facility: CLINIC | Age: 64
End: 2024-01-19
Payer: MEDICARE

## 2024-01-19 ENCOUNTER — LAB (OUTPATIENT)
Dept: LAB | Facility: CLINIC | Age: 64
End: 2024-01-19
Payer: MEDICARE

## 2024-01-19 DIAGNOSIS — Z94.2 S/P LUNG TRANSPLANT (H): ICD-10-CM

## 2024-01-19 PROCEDURE — 80197 ASSAY OF TACROLIMUS: CPT

## 2024-01-19 NOTE — TELEPHONE ENCOUNTER
Critical lab value of hgb 8.6 (9.8 on 1/10/24). Result sent to Kaylin and [employer] message sent to patient to see how she is feeling

## 2024-01-20 LAB
TACROLIMUS BLD-MCNC: 12.9 UG/L (ref 5–15)
TME LAST DOSE: NORMAL H
TME LAST DOSE: NORMAL H

## 2024-01-22 DIAGNOSIS — Z94.2 S/P LUNG TRANSPLANT (H): ICD-10-CM

## 2024-01-22 NOTE — RESULT ENCOUNTER NOTE
Tacrolimus level 12.9 at 12 hours, on 1/19/24.  Goal 8-10.   Current dose 4 mg in AM, 4.5 mg in PM    Dose changed to 4 mg in AM, 4 mg in PM   Recheck level next week    MyChart message sent and confirmed with pt.

## 2024-01-24 ENCOUNTER — VIRTUAL VISIT (OUTPATIENT)
Dept: GASTROENTEROLOGY | Facility: CLINIC | Age: 64
End: 2024-01-24
Attending: INTERNAL MEDICINE
Payer: MEDICARE

## 2024-01-24 ENCOUNTER — MYC MEDICAL ADVICE (OUTPATIENT)
Dept: GASTROENTEROLOGY | Facility: CLINIC | Age: 64
End: 2024-01-24
Payer: MEDICARE

## 2024-01-24 VITALS — HEIGHT: 62 IN | WEIGHT: 104 LBS | BODY MASS INDEX: 19.14 KG/M2

## 2024-01-24 DIAGNOSIS — K31.84 GASTROPARESIS: Primary | ICD-10-CM

## 2024-01-24 PROCEDURE — 99214 OFFICE O/P EST MOD 30 MIN: CPT | Mod: 95 | Performed by: INTERNAL MEDICINE

## 2024-01-24 ASSESSMENT — PAIN SCALES - GENERAL: PAINLEVEL: MILD PAIN (3)

## 2024-01-24 NOTE — PROGRESS NOTES
Virtual Visit Details    Type of service:  Video Visit   Video Start Time: 8:57 AM  Video End Time:9:20 PM    Originating Location (pt. Location): Home    Distant Location (provider location):  On-site  Platform used for Video Visit: [facility]        INTERVENTIONAL ENDOSCOPY OUTPATIENT CLINIC CONSULT  DATE OF SERVICE: 01/24/24   PROVIDER REQUESTING CONSULT: Kaylin Triana PA-C  Reason for Consultation: gastroparesis     ASSESSMENT:  Sofie is a 63 year old female with a PMHx of end stage COPD s/p bilateral lung transplant on 6/28/22 complicated by acute limb ischemia of LUE s/p left radial thrombectomy, h/o C. Diff, h/o EBV viremia, ESRD on dialysis, hemobilia s/p ERCP presumably due to hemorrhage into gallbladder, initially referred for gastroparesis s/p PEGJ placement and chronic diarrhea with +SIBO testing. S/p G-POEM on 10/9/23 here for follow up.    #Gastroparesis  No improvement following G-POEM. Repeat gastric emptying study showed worsening, but this is also in the setting of new opiates after her recent hip fracture. Will nevertheless be likely unchanged even off opiates. We discussed starting Reglan. Because of her dialysis, I would favor starting at 5 mg daily for ~1 week and probably no more than 5 mg BID with 2 week drug holidays every 12 weeks. However, this may not help with the J-tube feed intolerance as this is likely from small bowel hypomotility. We discussed the risk of Reglan. She would like to hold off and see if the new tube feed formulation helps first.     #J-tube feeding intolerance  #Chronic Osmotic Diarrhea/SIBO  #Weight loss  Her inpatient work up was consistent with an osmotic diarrhea (negative colonoscopy and infectious work up). This would suggest a malabsorptive process and with the associated gas/bloating symptoms and small bowel dysmotility suggested by J-tube feeding intolerance. SIBO testing was positive. Ultimately completed a few different antibiotic regimens including Rifaximin  550 mg TID x 2 weeks. She now reports normalization of her bowel and was able to titrate down her imodium to BID. Potentially able to titrate further down.     Limited medical options for small bowel hypomotility in the setting of vagal injury. Ultimately, she may need to go on TPN if unable to reach tube feed goals and continues to loose weight.    RECOMMENDATIONS:  - Follow up in 2 months. Patient to reach out sooner if willing to start on Reglan    Gonzalez Navarro MD  [facility]  Division of Gastroenterology and Hepatology  [facility] [address] [address]    ________________________________________________________________  HPI:  Sofie is a 63 year old female with a PMHx of end stage COPD s/p bilateral lung transplant on 6/28/22 complicated by acute limb ischemia of LUE s/p left radial thrombectomy, h/o C. Diff, h/o EBV viremia, ESRD on dialysis, hemobilia s/p ERCP presumably due to hemorrhage into gallbladder, initially referred for gastroparesis s/p PEGJ placement but but also had chronic diarrhea with +SIBO testing. Now s/p several rounds of SIBO treatments and G-POEM.    PRIOR History:  She essentially developed gastroparesis following her lung transplant and underwent PEGJ placement by IR on 7/27/22. But also of note is development of a pyloric channel ulcer seen on an EGD on 8/11/22. She's been on BID PPI since then. Gastric emptying study on 1/2/2023 show delayed emptying of 75% at 240 min. She continues on J-tube feeds but does eat sometimes for comfort. Eating can make her symptomatic with nausea, bloating/gas. She will periodically vent her G-tube about once a week, sometimes more frequently if she's been eating, when she has symptoms of bloating/gas and distension which helps. She has never been on Reglan.     She underwent EGD with pyloric Botox injection on 1/25/23. She noticed an improvement following the procedure although still some residual  symptoms.     1/25/23 GCSI= 3,0,0,3,2,2,3,4,4=21   2/24/23 GCSI= 1,0,0,3,1,1,2,2,2=12    Interval History:  She underwent G-POEM procedure on 10/9/23. Procedure was uneventful. No real improvement following procedure. She eats only one meal a day. Sometimes two with associated nausea. Not able to tolerate J tube feeds at goal rate. Weight is down to 104 lbs. She completed a course of Rifaximin after G-POEM hospital discharge. This had initial modest improvement with ~3 loose stools a day. However, now she reports ~1 formed bowel movement a day and she has been titrating down her imodium to twice daily. Her transplant team is switching her tube feed formulation to elemental to see if she can tolerate a higher rate. She had a right hip fracture in December and had a lengthy hospitalization for that. She is tapering down on opiates from that.    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    Hepatitis 2017    Hep C, [facility]    History of blood transfusion     HTN (hypertension)     Infectious mononucleosis     Lung infection 11/30/2022    Osteopenia      Patient Active Problem List   Diagnosis    COPD (chronic obstructive pulmonary disease) (H)    Abnormal findings on diagnostic imaging of cardiovascular system    Status post coronary angiogram    Hepatitis C, chronic (H)    S/P lung transplant (H)    Acute post-operative pain    Immunosuppressed status (H24)    Acute kidney failure with tubular necrosis (H24)    Thrombus of left radial artery (H)    Diaphragm dysfunction    Paroxysmal A-fib (H)    Administration of long-term prophylactic antibiotics    EBV (Vibha-Barr virus) viremia    Acute pylorus  ulcer    Hypogammaglobulinemia (H24)    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 difficile colitis    Leukopenia, unspecified type    Clinical diagnosis of COVID-19       PSurgHx:  Past Surgical History:   Procedure Laterality Date    BRONCHOSCOPY (RIGID OR FLEXIBLE), DIAGNOSTIC N/A 08/02/2022    Procedure: BRONCHOSCOPY, DIAGNOSTIC- inspection Bronch;  Surgeon: Kamala Lovell MD;  Location: [facility]    BRONCHOSCOPY (RIGID OR FLEXIBLE), DIAGNOSTIC N/A 09/13/2022    Procedure: INSPECTION BRONCHOSCOPY, WITH BRONCHOALVEOLAR LAVAGE;  Surgeon: Jose R Mccullough MD;  [facility]: [facility]    BRONCHOSCOPY (RIGID OR FLEXIBLE), DIAGNOSTIC N/A 11/09/2022    Procedure: BRONCHOSCOPY, WITH BRONCHOALVEOLAR LAVAGE AND BIOPSY;  Surgeon: Cesar Liam MD;  [facility]    BRONCHOSCOPY (RIGID OR FLEXIBLE), DIAGNOSTIC N/A 01/25/2023    Procedure: BRONCHOSCOPY, WITH BRONCHOALVEOLAR LAVAGE AND BIOPSY;  Surgeon: Mason Reddy MD;  [facility]    BRONCHOSCOPY (RIGID OR FLEXIBLE), DIAGNOSTIC N/A 04/19/2023    Procedure: BRONCHOSCOPY, WITH BRONCHOALVEOLAR LAVAGE AND BIOPSY;  Surgeon: Kamala Lovell MD;  [facility]    BRONCHOSCOPY (RIGID OR FLEXIBLE), DIAGNOSTIC N/A 07/12/2023    Procedure: BRONCHOSCOPY, WITH BRONCHOALVEOLAR LAVAGE AND BIOPSY;  Surgeon: Cesar Lima MD;  [facility]: [facility]    BRONCHOSCOPY FLEXIBLE AND RIGID N/A 07/19/2022    Procedure: BRONCHOSCOPY inspection only;  Surgeon: Bob Liao MD;  [facility]: [facility]    COLONOSCOPY  2015    CORONARY ANGIOGRAPHY ADULT ORDER      CV CORONARY  ANGIOGRAM N/A 06/30/2021    Procedure: CV CORONARY ANGIOGRAM;  Surgeon: Alexander Cuellar MD;  Location:  HEART CARDIAC CATH LAB    CV RIGHT HEART CATH MEASUREMENTS RECORDED N/A 06/30/2021    Procedure: CV RIGHT HEART CATH;  Surgeon: Alexander Cuellar MD;  Location:  HEART CARDIAC CATH LAB    ENDOSCOPIC PERORAL MYOTOMY N/A 10/09/2023    Procedure: MYOTOMY, ESOPHAGUS, ENDOSCOPIC, ORAL APPROACH;  Surgeon: Gonzalez Navarro MD;  [facility]    ENDOSCOPIC RETROGRADE CHOLANGIOPANCREATOGRAM N/A 08/11/2022    Procedure: ENDOSCOPIC RETROGRADE CHOLANGIOPANCREATOGRAPHY WITH PANCREATIC DUCT NEEDLE KNIFE AND STENT PLACEMENT, BILE DUCT SPHINCTEROTOMY, BLOOD/DEBRIS REMOVAL AND STENT PLACEMENT;  Surgeon: Cosmo Arroyo MD;  [facility]    ENDOSCOPIC RETROGRADE CHOLANGIOPANCREATOGRAM N/A 10/07/2022    Procedure: ENDOSCOPIC RETROGRADE CHOLANGIOPANCREATOGRAPHY with biliary and pancreatic stent removal, debris removal;  Surgeon: Cosmo Arroyo MD;  Location:  OR    ENT SURGERY  1974    tonsillectomy    ENTEROSCOPY SMALL BOWEL N/A 08/11/2022    Procedure: SMALL BOWEL ENTEROSCOPY;  Surgeon: Cosmo Arroyo MD;  Location:  OR    ESOPHAGOGASTRODUODENOSCOPY, WITH NASOGASTRIC TUBE INSERTION N/A 07/01/2022    Procedure: ESOPHAGOGASTRODUODENOSCOPY, WITH NASOJEJUNAL TUBE INSERTION;  Surgeon: Ozzy Nickerson MD;  Location:  GI    ESOPHAGOSCOPY, GASTROSCOPY, DUODENOSCOPY (EGD), COMBINED N/A 08/03/2022    Procedure: ESOPHAGOGASTRODUODENOSCOPY (EGD);  Surgeon: Ira Andres MD;  Location:  GI    ESOPHAGOSCOPY, GASTROSCOPY, DUODENOSCOPY (EGD), COMBINED N/A 01/25/2023    Procedure: ESOPHAGOGASTRODUODENOSCOPY (EGD) with botox injection;  Surgeon: Gonzalez Navarro MD;  Location:  GI    ESOPHAGOSCOPY, GASTROSCOPY, DUODENOSCOPY (EGD), COMBINED N/A 10/09/2023    Procedure: ESOPHAGOGASTRODUODENOSCOPY;  Surgeon: Gonzalez Navarro MD;  [facility]    HAND SURGERY      INSERT CHEST TUBE Right  09/13/2022    Procedure: Insert chest tube;  Surgeon: Jose R Mccullough MD;  [facility]: [facility]    IR CVC TUNNEL PLACEMENT > 5 YRS OF AGE  09/26/2022    IR GASTRO JEJUNOSTOMY TUBE CHANGE  08/31/2022    IR GASTRO JEJUNOSTOMY TUBE CHANGE  12/21/2022    IR GASTRO JEJUNOSTOMY TUBE CHANGE  07/12/2023    IR GASTRO JEJUNOSTOMY TUBE CHANGE  08/18/2023    IR GASTRO JEJUNOSTOMY TUBE CHANGE  11/14/2023    IR GASTRO JEJUNOSTOMY TUBE PLACEMENT  07/27/2022    IR THORACENTESIS  08/29/2022    LEEP TX, CERVICAL  04/07/2017    HECTOR III    LYMPH NODE BIOPSY Left 2005    Left axilla, benign- [place]    MIDLINE INSERTION - DOUBLE LUMEN Left 07/28/2022    20cm, Basilic vein    REPLACE GASTROJEJUNOSTOMY TUBE, PERCUTANEOUS  10/07/2022    Procedure: Replace Gastrojejunostomy Tube;  Surgeon: Cosmo Arroyo MD;  [facility]    THORACENTESIS Left 08/29/2022    Procedure: THORACENTESIS;  Surgeon: Bo Capone PA-C;  Location: [facility] OR    THORACENTESIS Left 09/13/2022    Procedure: Thoracentesis;  Surgeon: Jose R Mccullough MD;  [facility]    THROMBECTOMY UPPER EXTREMITY Left 07/02/2022    Procedure: LEFT RADIAL ARM THROMBECTOMY;  Surgeon: Christie Graham MD;  [facility]    TRANSPLANT LUNG RECIPIENT SINGLE X2 Bilateral 06/28/2022    Procedure: Clamshell Incision, Bilateral Sequential Lung Transplant, On Cardiopulmonary Bypass, Flexible Bronchoscopy;  Surgeon: Sue Sunshine MD;  [facility]       MEDS:  Current Outpatient Medications   Medication    azaTHIOprine (IMURAN) 5 mg/mL SUSP    B Complex-C-Folic Acid (DIALYVITE) TABS    Calcium Carbonate-Vitamin D 600-10 MG-MCG TABS    dapsone 2 mg/mL SUSP    levofloxacin (LEVAQUIN) 250 MG tablet    loperamide (IMODIUM A-D) 2 MG tablet    metoprolol tartrate (LOPRESSOR) 50 MG tablet    multivitamin (CENTRUM SILVER) tablet    pantoprazole (PROTONIX) 2 mg/mL SUSP suspension    predniSONE (DELTASONE) 5 MG tablet    protein modular (PROSOURCE TF) LIQD    sevelamer  carbonate, RENVELA, 0.8 GM PACK Packet    tacrolimus (GENERIC) 1 mg/mL suspension    vitamin B-12 (CYANOCOBALAMIN) 500 MCG tablet     No current facility-administered medications for this visit.     ALLERGIES:    Allergies   Allergen Reactions    Blood Transfusion Related (Informational Only) Other (See Comments)     Patient has a history of a clinically significant antibody against RBC antigens.  A delay in compatible RBCs may occur.     No Clinical Screening - See Comments Other (See Comments)     Patient has a history of a clinically significant antibody against RBC antigens.  A delay in compatible RBCs may occur.    Azithromycin Rash     12/1/22: Developed a rash that is not raised and looks diffuse in nature. It started in the groin and up the back and has now worked its way up her chest into her face. Pt states that it has now started to itch. She is breathing and talking normally and denies any airway changes. Unclear what started rash. Pt noted feeling somewhat itchy yesterday.    Ganciclovir Rash     12/1/22: Developed a rash that is not raised and looks diffuse in nature. It started in the groin and up the back and has now worked its way up her chest into her face. Pt states that it has now started to itch. She is breathing and talking normally and denies any airway changes. Unclear what started rash. Pt noted feeling somewhat itchy yesterday.     FHx: Noncontributory    SOCIAL Hx:  Social History     Socioeconomic History    Marital status:      Spouse name: Not on file    Number of children: Not on file    Years of education: Not on file    Highest education level: Not on file   Occupational History    Not on file   Tobacco Use    Smoking status: Former     Packs/day: 0.50     Years: 30.00     Additional pack years: 0.00     Total pack years: 15.00     Types: Cigarettes     Quit date: 11/11/2020     Years since quitting: 3.2    Smokeless tobacco: Never   Substance and Sexual Activity    Alcohol  "use: Not Currently     Comment: Stopped drinking in 2020    Drug use: Not Currently     Types: Marijuana, Methamphetamines     Comment: hx:marijuana and methamphetamine-quit both unsure ?  2-3 yrs ago    Sexual activity: Not on file   Other Topics Concern    Parent/sibling w/ CABG, MI or angioplasty before 65F 55M? Not Asked   Social History Narrative    Not on file     Social Determinants of Health     Financial Resource Strain: Not on file   Food Insecurity: Not on file   Transportation Needs: Not on file   Physical Activity: Not on file   Stress: Not on file   Social Connections: Not on file   Interpersonal Safety: Not At Risk (9/1/2023)    Humiliation, Afraid, Rape, and Kick questionnaire     Fear of Current or Ex-Partner: No     Emotionally Abused: No     Physically Abused: No     Sexually Abused: No   Housing Stability: Not on file       ROS: A comprehensive Review of Systems was asked and answered in the negative unless specifically commented upon in the HPI    Physical Exam  Ht 1.575 m (5' 2\")   Wt 47.2 kg (104 lb)   BMI 19.02 kg/m    Body mass index is 19.02 kg/m .  Gen: A&Ox3, NAD  HEENT: Moist mucus membranes, no scleral icterus.  Lungs: no respiratory distress      LABS:  External Order Results on 12/30/2022   Component Date Value Ref Range Status    Sodium (External) 12/30/2022 137  136 - 146 mmol/L Final    Potassium (External) 12/30/2022 4.1  3.5 - 5.1 mmol/L Final    Chloride (External) (External) 12/30/2022 97 (L)  98 - 107 mmol/L Final    CO2 (External) 12/30/2022 27  22 - 29 mmol/L Final    Anion Gap (External) 12/30/2022 17.1  10.0 - 20.0 mmol/L Final    Creatinine (External) 12/30/2022 4.34  mg/dL Final    Urea Nitrogen (External) 12/30/2022 43.6 (H)  10.0 - 20.0 mg/dL Final    BUN/Creatinine Ratio (External) 12/30/2022 10.05 (L)  11.70 - 22.90 ratio Final    Calcium (External) 12/30/2022 9.5  8.6 - 10.5 mg/dL Final    Glucose (External) 12/30/2022 102 (H)  70 - 100 mg/dL Final    GFR " Estimated (External) 12/30/2022 11 (L)  >=60 ml/min/1.73m2 Final    Magnesium (External) 12/30/2022 2.5  1.8 - 2.6 mg/dL Final    WBC Count (External) 12/30/2022 4.7  10(3)/uL Final    RBC Count (External) 12/30/2022 2.81  10(6)/uL Final    Hemoglobin (External) 12/30/2022 10.3  g/dL Final    Hematocrit (External) 12/30/2022 31.1  % Final    MCV (External) 12/30/2022 110.7  fL Final    MCH (External) 12/30/2022 36.5  pg Final    MCHC (External) 12/30/2022 33.0  32.0 - 36.0 g/dL Final    RDW (External) 12/30/2022 16.2  % Final    Platelet Count (External) 12/30/2022 251  179 - 450 10(3)/uL Final         IMAGING:  EXAM:  NM GASTRIC EMPTYING, 1/15/2024 1:31 PM     HISTORY: 3 month post gastric poem, f/up gastroparesis; Gastroparesis     TECHNIQUE: The patient ingested 2 mCi of Tc-99m sulfur colloid in 2  eggs, 2 pieces toast with jam and water.. Static images were acquired  at approximately 1 hour intervals out through 4 hours using a dual  head gamma camera in an anterior and posterior position. The  calculation of emptying was based on dual head imaging with geometric  mean calculations.  A Linear square fit was calculated to the emptying  curve to determine the amount of residual activity at each time point.     COMPARISON: 1/2/2023     FINDINGS:   Percent retention at 60 min = 93%.  Percent retention at 120 min = 93%.  Percent retention at 180 min = 89%.  Percent retention at 240 min = 86%.     T 1/2 emptying time =N/A                                                                      IMPRESSION: Delayed gastric emptying (see normal ranges below). It is  worse than 1/2/2023. Now the percent retention at 4 hours is 86%,  before on 1/2/2023 75%.     EXAM:  NM GASTRIC EMPTYING, 1/2/2023 1:21 PM  HISTORY: Gastroparesis; Lung replaced by transplant (H)     TECHNIQUE: The patient ingested 2.1 mCi of Tc-99m sulfur colloid in 2  eggs, 2 pieces of toast w/ jelly. Static images were acquired at  approximately 1 hour  intervals out through 4 hours using a dual head  gamma camera in an anterior and posterior position. The calculation of  emptying was based on dual head imaging with geometric mean  calculations.  A Linear square fit was calculated to the emptying  curve to determine the amount of residual activity at each time point.     FINDINGS:   Percent retention at 60 min = 87%.  Percent retention at 120 min = 77%.  Percent retention at 180 min = 77%.  Percent retention at 240 min = 75%.     T 1/2 emptying time =  238 mins.                                                                      IMPRESSION: Delayed gastric emptying    EXAM:  NM GASTRIC EMPTYING, 9/30/2022 12:32 PM     HISTORY: severe delayed gastric emptying for follow up; is off  dialysis. NPO weds night     TECHNIQUE: The patient ingested 2.3 mCi of Tc-99m sulfur colloid in in  2 eggs, 1 slices of toast with jelly, and a glass of water. Static  images were acquired at approximately 1 hour intervals out through 4  hours using a dual head gamma camera in an anterior and posterior  position. The calculation of emptying was based on dual head imaging  with geometric mean calculations.  A Linear square fit was calculated  to the emptying curve to determine the amount of residual activity at  each time point.     FINDINGS:   Percent retention at 60 min = 97%.  Percent retention at 120 min = 95%.  Percent retention at 180 min = 91%.  Percent retention at 240 min = 91%.     T 1/2 emptying time =  Too long to calculate.                                                                      IMPRESSION: Severe delayed gastric emptying    Endoscopies:  Upper GI Endoscopy 10/09/2023 11:50 AM [address] [address] [phone]     Endoscopy Department  _______________________________________________________________________________  Patient Name: Sofie Rodriguez            Procedure Date: 10/9/2023 11:50  SYLVIA  MRN: [id number]                       Account Number: [id number]  YOB: 1960               Admit Type: Outpatient  Age: 63                               Room:  OR   Gender: Female                        Note Status: Supervisor Override  Attending MD: OSKAR PÉREZ MD,      Pause for the Cause: time out performed  Total Sedation Time:                    _______________________________________________________________________________     Procedure:             Upper GI endoscopy  Indications:           For therapy of gastroparesis; 63 year old female with                         a PMHx of 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 Diff, h/o EBV                         viremia, ESRD on dialysis, hemobilia s/p ERCP                         presumably due to hemorrhage into gallbladder,                         gastroparesis s/p PEGJ and diarrhea related to SIBO.                         Responded previously to pyloric botox. Patient here                         for G-POEM. GCSI= 3,0,0,3,2,2,3,4,4=21  Providers:             OSKAR PÉREZ MD  Referring MD:            Requesting Provider:   MICHELE SOSA MD  Medicines:             General Anesthesia, IV Zosyn 3.375g  Complications:         No immediate complications. Estimated blood loss:                         Minimal.  _______________________________________________________________________________  Procedure:             Pre-Anesthesia Assessment:                         - Prior to the procedure, a History and Physical was                         performed, and patient medications and allergies were                         reviewed. The patient is competent. The risks and                         benefits of the procedure and the sedation options and                         risks were discussed with the  patient. All questions                         were answered and informed consent was obtained.                         Patient identification and proposed procedure were                         verified by the physician, the nurse, the                         anesthesiologist and the anesthetist in the procedure                         room. Mental Status Examination: alert and oriented.                         Airway Examination: normal oropharyngeal airway and                         neck mobility. Respiratory Examination: clear to                         auscultation. CV Examination: normal. Prophylactic                         Antibiotics: The patient requires prophylactic                         antibiotics peroral endoscopic myotomy. Prior                         Anticoagulants: The patient has taken no anticoagulant                         or antiplatelet agents. ASA Grade Assessment: III - A                         patient with severe systemic disease. After reviewing                         the risks and benefits, the patient was deemed in                         satisfactory condition to undergo the procedure. The                         anesthesia plan was to use general anesthesia.                         Immediately prior to administration of medications,                         the patient was re-assessed for adequacy to receive                         sedatives. The heart rate, respiratory rate, oxygen                         saturations, blood pressure, adequacy of pulmonary                         ventilation, and response to care were monitored                         throughout the procedure. The physical status of the                         patient was re-assessed after the procedure.                         After obtaining informed consent, the endoscope was                         passed under direct vision. Throughout the procedure,                         the patient's blood pressure,  pulse, and oxygen                         saturations were monitored continuously. The Endoscope                         was introduced through the mouth, and advanced to the                         second part of duodenum. The was introduced through                         the mouth, and advanced to the. After obtaining                         informed consent, the endoscope was passed under                         direct vision. Throughout the procedure, the patient's                         blood pressure, pulse, and oxygen saturations were                         monitored continuously.The endoscopic myotomy was                         accomplished without difficulty. The patient tolerated                         the procedure well.                                                                                  Findings:       The esophagus was normal.       There was evidence of an intact gastrostomy with a patent GJ-tube       present in the gastric body extending into the duodneum. This was       characterized by healthy appearing mucosa. Small amount of retained food       and fluid in the stomach.       The examined duodenum was normal.       Localized moderate mucosal changes characterized by nodularity were       found at the pylorus at the 11 o'clock position. Likely hyperplastic       from GJ tube.       Preparations were made for gastric peroral endoscopic myotomy (G-POEM).       The stomach was cleaned and irrigated using saline and suctioned.       Mucosotomy followed by myotomy were performed in a greater curvature       orientation. First, a submucosal injection of a solution of methylene       blue and saline was used to lift the mucosa at the site of the initial       mucosotomy. The initial mucosal incision was made transversely at       approximately 4.0 cm proximal to the pylorus using a HybridKnife I-Type.       Next, the endoscope with a clear cap was used to enter into the       submucosal  tunnel. The submucosal tunnel was then further created by       dissection of the submucosal fibers distally to the pyloric ring. A full       thickness myotomy (in a greater curvature orientation) was then       performed beginning at the pylorus using an ITknife2. The myotomy was       extended to 3.0 cm proximal to the pylorus. Intra procedure bleeding was       mild. A small Coagrasper was used effectively for hemostasis. After       completion of the myotomy, examination of the stomach and duodenum       showed no inadvertent mucosotomy. There was no evidence of bleeding       noted on the inspection of the myotomy edges and submucosal tunnel. The       myotomy was successfully performed. The mucosal entrance to the       submucosal tunnel was closed using endoscopic suturing. After G-POEM and       endoscope removal, the patient was examined. The patient's abdomen was       nondistended. Pylorus lumen open without resistance to passage.                                                                                   Impression:            - Normal esophagus.                         - GJ tube in place and not manipulated                         - Normal examined duodenum.                         - Nodularity was found at the pylorus at the 11                         o'clock position. Likely hyperplastic from GJ tube.                         - Gastric peroral endoscopic myotomy (G-POEM) was                         performed along greater curvature as described above.                         Mucosotomy closed with endoscopic suturing.                         - No specimens collected.  Recommendation:        - Monitor in PACU and admit to medicine for                         observation.                         - NPO overnight. Leave G-tube to gravity and can start                         J-tube feeds tonight as tolerated                         - Started pantoprazole 40 mg IV BID.                         - Upon  discharge, prescribe pantoprazole 40 mg PO BID                         x1-month.                         - Continue Zosyn IV while inpatient.                         - Patient had a positive breath test for SIBO but                         insurance denied Rifaximin 550 mg TID x 2 weeks.                         Consider attempting re-prescribing on discharge as did                         not respond to course of cipro. Does not need                         additional antibiotics related to procedure.                         - No anticoagulation or antiplatelets for 3-days.                         - Ordered head of bed elevation to 30-45 degrees.                         - Ordered incentive spirometry.                         - Order analgesia IV & antiemetics IV PRN.                         - Ordered CBC & BMP tomorrow AM                         - Ordered Upper GI series tomorrow AM to rule-out                         post-POEM gastric leak. Pneumoperitoneum expected.                         - If no gastric leak, and otherwise doing clinically                         well discharge home tomorrow and start full liquid diet                         - Liquid diet for three days starting tomorrow. Soft                         diet on day 4, 5,6. Regular diet starting on POD#7 as                         tolerated                         - Virtual follow-up with Dr. Navarro in 1-month.                                                                                     Gonzalez Navarro MD

## 2024-01-24 NOTE — NURSING NOTE
Is the patient currently in the state of [place]? YES    Visit mode:VIDEO    If the visit is dropped, the patient can be reconnected by: VIDEO VISIT: Text to cell phone:   Telephone Information:   Mobile [phone]       Will anyone else be joining the visit? NO  (If patient encounters technical issues they should call [phone]    How would you like to obtain your AVS? MyChart    Are changes needed to the allergy or medication list? No    Reason for visit: JULIO SERRANO

## 2024-01-24 NOTE — LETTER
1/24/2024         RE: Sofie Rodriguez  [address] [place] [address]        Dear Colleague,    Thank you for referring your patient, Sofie Rodriguez, to the [facility]. Please see a copy of my visit note below.    INTERVENTIONAL ENDOSCOPY OUTPATIENT CLINIC CONSULT  DATE OF SERVICE: 01/24/24   PROVIDER REQUESTING CONSULT: Kaylin Triana PA-C  Reason for Consultation: gastroparesis     ASSESSMENT:  Sofie is a 63 year old female with a PMHx of end stage COPD s/p bilateral lung transplant on 6/28/22 complicated by acute limb ischemia of LUE s/p left radial thrombectomy, h/o C. Diff, h/o EBV viremia, ESRD on dialysis, hemobilia s/p ERCP presumably due to hemorrhage into gallbladder, initially referred for gastroparesis s/p PEGJ placement and chronic diarrhea with +SIBO testing. S/p G-POEM on 10/9/23 here for follow up.    #Gastroparesis  No improvement following G-POEM. Repeat gastric emptying study showed worsening, but this is also in the setting of new opiates after her recent hip fracture. Will nevertheless be likely unchanged even off opiates. We discussed starting Reglan. Because of her dialysis, I would favor starting at 5 mg daily for ~1 week and probably no more than 5 mg BID with 2 week drug holidays every 12 weeks. However, this may not help with the J-tube feed intolerance as this is likely from small bowel hypomotility. We discussed the risk of Reglan. She would like to hold off and see if the new tube feed formulation helps first.     #J-tube feeding intolerance  #Chronic Osmotic Diarrhea/SIBO  #Weight loss  Her inpatient work up was consistent with an osmotic diarrhea (negative colonoscopy and infectious work up). This would suggest a malabsorptive process and with the associated gas/bloating symptoms and small bowel dysmotility suggested by J-tube feeding intolerance. SIBO testing was positive. Ultimately completed a few different antibiotic regimens including  Rifaximin 550 mg TID x 2 weeks. She now reports normalization of her bowel and was able to titrate down her imodium to BID. Potentially able to titrate further down.     Limited medical options for small bowel hypomotility in the setting of vagal injury. Ultimately, she may need to go on TPN if unable to reach tube feed goals and continues to loose weight.    RECOMMENDATIONS:  - Follow up in 2 months. Patient to reach out sooner if willing to start on Reglan    Gonzalez Navarro MD  [facility]  Division of Gastroenterology and Hepatology  [facility] [address]  [address] [place] [id number]    ________________________________________________________________  HPI:  Sofie is a 63 year old female with a PMHx of end stage COPD s/p bilateral lung transplant on 6/28/22 complicated by acute limb ischemia of LUE s/p left radial thrombectomy, h/o C. Diff, h/o EBV viremia, ESRD on dialysis, hemobilia s/p ERCP presumably due to hemorrhage into gallbladder, initially referred for gastroparesis s/p PEGJ placement but but also had chronic diarrhea with +SIBO testing. Now s/p several rounds of SIBO treatments and G-POEM.    PRIOR History:  She essentially developed gastroparesis following her lung transplant and underwent PEGJ placement by IR on 7/27/22. But also of note is development of a pyloric channel ulcer seen on an EGD on 8/11/22. She's been on BID PPI since then. Gastric emptying study on 1/2/2023 show delayed emptying of 75% at 240 min. She continues on J-tube feeds but does eat sometimes for comfort. Eating can make her symptomatic with nausea, bloating/gas. She will periodically vent her G-tube about once a week, sometimes more frequently if she's been eating, when she has symptoms of bloating/gas and distension which helps. She has never been on Reglan.     She underwent EGD with pyloric Botox injection on 1/25/23. She noticed an improvement following the procedure although still some  residual symptoms.     1/25/23 GCSI= 3,0,0,3,2,2,3,4,4=21   2/24/23 GCSI= 1,0,0,3,1,1,2,2,2=12    Interval History:  She underwent G-POEM procedure on 10/9/23. Procedure was uneventful. No real improvement following procedure. She eats only one meal a day. Sometimes two with associated nausea. Not able to tolerate J tube feeds at goal rate. Weight is down to 104 lbs. She completed a course of Rifaximin after G-POEM hospital discharge. This had initial modest improvement with ~3 loose stools a day. However, now she reports ~1 formed bowel movement a day and she has been titrating down her imodium to twice daily. Her transplant team is switching her tube feed formulation to elemental to see if she can tolerate a higher rate. She had a right hip fracture in December and had a lengthy hospitalization for that. She is tapering down on opiates from that.    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    Hepatitis 2017    Hep C, [facility]    History of blood transfusion     HTN (hypertension)     Infectious mononucleosis     Lung infection 11/30/2022    Osteopenia      Patient Active Problem List   Diagnosis    COPD (chronic obstructive pulmonary disease) (H)    Abnormal findings on diagnostic imaging of cardiovascular system    Status post coronary angiogram    Hepatitis C, chronic (H)    S/P lung transplant (H)    Acute post-operative pain    Immunosuppressed status (H24)    Acute kidney failure with tubular necrosis (H24)    Thrombus of left radial artery (H)    Diaphragm dysfunction    Paroxysmal A-fib (H)    Administration of long-term prophylactic antibiotics    EBV (Vibha-Barr virus) viremia    Acute  pylorus ulcer    Hypogammaglobulinemia (H24)    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 difficile colitis    Leukopenia, unspecified type    Clinical diagnosis of COVID-19       PSurgHx:  Past Surgical History:   Procedure Laterality Date    BRONCHOSCOPY (RIGID OR FLEXIBLE), DIAGNOSTIC N/A 08/02/2022    Procedure: BRONCHOSCOPY, DIAGNOSTIC- inspection Bronch;  Surgeon: Kamala Lovell MD;  Location: [facility]    BRONCHOSCOPY (RIGID OR FLEXIBLE), DIAGNOSTIC N/A 09/13/2022    Procedure: INSPECTION BRONCHOSCOPY, WITH BRONCHOALVEOLAR LAVAGE;  Surgeon: Jose R Mccullough MD;  Location: [facility]    BRONCHOSCOPY (RIGID OR FLEXIBLE), DIAGNOSTIC N/A 11/09/2022    Procedure: BRONCHOSCOPY, WITH BRONCHOALVEOLAR LAVAGE AND BIOPSY;  Surgeon: Cesar Lima MD;  Location: [facility]    BRONCHOSCOPY (RIGID OR FLEXIBLE), DIAGNOSTIC N/A 01/25/2023    Procedure: BRONCHOSCOPY, WITH BRONCHOALVEOLAR LAVAGE AND BIOPSY;  Surgeon: Mason Reddy MD;  Location: [facility]    BRONCHOSCOPY (RIGID OR FLEXIBLE), DIAGNOSTIC N/A 04/19/2023    Procedure: BRONCHOSCOPY, WITH BRONCHOALVEOLAR LAVAGE AND BIOPSY;  Surgeon: Kamala Lovell MD;  Location: [facility]    BRONCHOSCOPY (RIGID OR FLEXIBLE), DIAGNOSTIC N/A 07/12/2023    Procedure: BRONCHOSCOPY, WITH BRONCHOALVEOLAR LAVAGE AND BIOPSY;  Surgeon: Cesar Lima MD;  Location: [facility]    BRONCHOSCOPY FLEXIBLE AND RIGID N/A 07/19/2022    Procedure: BRONCHOSCOPY inspection only;  Surgeon: Bob Liao MD;  Location: [facility]    COLONOSCOPY  2015    CORONARY ANGIOGRAPHY ADULT ORDER      CV  CORONARY ANGIOGRAM N/A 06/30/2021    Procedure: CV CORONARY ANGIOGRAM;  Surgeon: Alexander Cuellar MD;  Location:  HEART CARDIAC CATH LAB    CV RIGHT HEART CATH MEASUREMENTS RECORDED N/A 06/30/2021    Procedure: CV RIGHT HEART CATH;  Surgeon: Alexander Cuellar MD;  Location:  HEART CARDIAC CATH LAB    ENDOSCOPIC PERORAL MYOTOMY N/A 10/09/2023    Procedure: MYOTOMY, ESOPHAGUS, ENDOSCOPIC, ORAL APPROACH;  Surgeon: Gonzalez Navarro MD;  [facility]    ENDOSCOPIC RETROGRADE CHOLANGIOPANCREATOGRAM N/A 08/11/2022    Procedure: ENDOSCOPIC RETROGRADE CHOLANGIOPANCREATOGRAPHY WITH PANCREATIC DUCT NEEDLE KNIFE AND STENT PLACEMENT, BILE DUCT SPHINCTEROTOMY, BLOOD/DEBRIS REMOVAL AND STENT PLACEMENT;  Surgeon: Cosmo Arroyo MD;  [facility]: [facility]    ENDOSCOPIC RETROGRADE CHOLANGIOPANCREATOGRAM N/A 10/07/2022    Procedure: ENDOSCOPIC RETROGRADE CHOLANGIOPANCREATOGRAPHY with biliary and pancreatic stent removal, debris removal;  Surgeon: Cosmo Arroyo MD;  Location:  OR    ENT SURGERY  1974    tonsillectomy    ENTEROSCOPY SMALL BOWEL N/A 08/11/2022    Procedure: SMALL BOWEL ENTEROSCOPY;  Surgeon: Cosmo Arroyo MD;  Location:  OR    ESOPHAGOGASTRODUODENOSCOPY, WITH NASOGASTRIC TUBE INSERTION N/A 07/01/2022    Procedure: ESOPHAGOGASTRODUODENOSCOPY, WITH NASOJEJUNAL TUBE INSERTION;  Surgeon: Ozzy Nickerson MD;  Location:  GI    ESOPHAGOSCOPY, GASTROSCOPY, DUODENOSCOPY (EGD), COMBINED N/A 08/03/2022    Procedure: ESOPHAGOGASTRODUODENOSCOPY (EGD);  Surgeon: Ira Andres MD;  Location:  GI    ESOPHAGOSCOPY, GASTROSCOPY, DUODENOSCOPY (EGD), COMBINED N/A 01/25/2023    Procedure: ESOPHAGOGASTRODUODENOSCOPY (EGD) with botox injection;  Surgeon: Gonzalez Navarro MD;  Location:  GI    ESOPHAGOSCOPY, GASTROSCOPY, DUODENOSCOPY (EGD), COMBINED N/A 10/09/2023    Procedure: ESOPHAGOGASTRODUODENOSCOPY;  Surgeon: Gonzalez Navarro MD;  Location: [facility]    HAND SURGERY      INSERT CHEST TUBE  Right 09/13/2022    Procedure: Insert chest tube;  Surgeon: Jose R Mccullough MD;  Location: [facility]    IR CVC TUNNEL PLACEMENT > 5 YRS OF AGE  09/26/2022    IR GASTRO JEJUNOSTOMY TUBE CHANGE  08/31/2022    IR GASTRO JEJUNOSTOMY TUBE CHANGE  12/21/2022    IR GASTRO JEJUNOSTOMY TUBE CHANGE  07/12/2023    IR GASTRO JEJUNOSTOMY TUBE CHANGE  08/18/2023    IR GASTRO JEJUNOSTOMY TUBE CHANGE  11/14/2023    IR GASTRO JEJUNOSTOMY TUBE PLACEMENT  07/27/2022    IR THORACENTESIS  08/29/2022    LEEP TX, CERVICAL  04/07/2017    HECTOR III    LYMPH NODE BIOPSY Left 2005    Left axilla, benign- [facility]    MIDLINE INSERTION - DOUBLE LUMEN Left 07/28/2022    20cm, Basilic vein    REPLACE GASTROJEJUNOSTOMY TUBE, PERCUTANEOUS  10/07/2022    Procedure: Replace Gastrojejunostomy Tube;  Surgeon: Cosmo Arroyo MD;  [facility]    THORACENTESIS Left 08/29/2022    Procedure: THORACENTESIS;  Surgeon: Bo Capone PA-C;  Location: [facility]    THORACENTESIS Left 09/13/2022    Procedure: Thoracentesis;  Surgeon: Jose R Mccullough MD;  Location: [facility]    THROMBECTOMY UPPER EXTREMITY Left 07/02/2022    Procedure: LEFT RADIAL ARM THROMBECTOMY;  Surgeon: Christie Graham MD;  [facility]    TRANSPLANT LUNG RECIPIENT SINGLE X2 Bilateral 06/28/2022    Procedure: Clamshell Incision, Bilateral Sequential Lung Transplant, On Cardiopulmonary Bypass, Flexible Bronchoscopy;  Surgeon: Sue Sunshine MD;  Location: [facility]       MEDS:  Current Outpatient Medications   Medication    azaTHIOprine (IMURAN) 5 mg/mL SUSP    B Complex-C-Folic Acid (DIALYVITE) TABS    Calcium Carbonate-Vitamin D 600-10 MG-MCG TABS    dapsone 2 mg/mL SUSP    levofloxacin (LEVAQUIN) 250 MG tablet    loperamide (IMODIUM A-D) 2 MG tablet    metoprolol tartrate (LOPRESSOR) 50 MG tablet    multivitamin (CENTRUM SILVER) tablet    pantoprazole (PROTONIX) 2 mg/mL SUSP suspension    predniSONE (DELTASONE) 5 MG tablet    protein modular (PROSOURCE TF) LIQD     sevelamer carbonate, RENVELA, 0.8 GM PACK Packet    tacrolimus (GENERIC) 1 mg/mL suspension    vitamin B-12 (CYANOCOBALAMIN) 500 MCG tablet     No current facility-administered medications for this visit.     ALLERGIES:    Allergies   Allergen Reactions    Blood Transfusion Related (Informational Only) Other (See Comments)     Patient has a history of a clinically significant antibody against RBC antigens.  A delay in compatible RBCs may occur.     No Clinical Screening - See Comments Other (See Comments)     Patient has a history of a clinically significant antibody against RBC antigens.  A delay in compatible RBCs may occur.    Azithromycin Rash     12/1/22: Developed a rash that is not raised and looks diffuse in nature. It started in the groin and up the back and has now worked its way up her chest into her face. Pt states that it has now started to itch. She is breathing and talking normally and denies any airway changes. Unclear what started rash. Pt noted feeling somewhat itchy yesterday.    Ganciclovir Rash     12/1/22: Developed a rash that is not raised and looks diffuse in nature. It started in the groin and up the back and has now worked its way up her chest into her face. Pt states that it has now started to itch. She is breathing and talking normally and denies any airway changes. Unclear what started rash. Pt noted feeling somewhat itchy yesterday.     FHx: Noncontributory    SOCIAL Hx:  Social History     Socioeconomic History    Marital status:      Spouse name: Not on file    Number of children: Not on file    Years of education: Not on file    Highest education level: Not on file   Occupational History    Not on file   Tobacco Use    Smoking status: Former     Packs/day: 0.50     Years: 30.00     Additional pack years: 0.00     Total pack years: 15.00     Types: Cigarettes     Quit date: 11/11/2020     Years since quitting: 3.2    Smokeless tobacco: Never   Substance and Sexual Activity     "Alcohol use: Not Currently     Comment: Stopped drinking in 2020    Drug use: Not Currently     Types: Marijuana, Methamphetamines     Comment: hx:marijuana and methamphetamine-quit both unsure ?  2-3 yrs ago    Sexual activity: Not on file   Other Topics Concern    Parent/sibling w/ CABG, MI or angioplasty before 65F 55M? Not Asked   Social History Narrative    Not on file     Social Determinants of Health     Financial Resource Strain: Not on file   Food Insecurity: Not on file   Transportation Needs: Not on file   Physical Activity: Not on file   Stress: Not on file   Social Connections: Not on file   Interpersonal Safety: Not At Risk (9/1/2023)    Humiliation, Afraid, Rape, and Kick questionnaire     Fear of Current or Ex-Partner: No     Emotionally Abused: No     Physically Abused: No     Sexually Abused: No   Housing Stability: Not on file       ROS: A comprehensive Review of Systems was asked and answered in the negative unless specifically commented upon in the HPI    Physical Exam  Ht 1.575 m (5' 2\")   Wt 47.2 kg (104 lb)   BMI 19.02 kg/m    Body mass index is 19.02 kg/m .  Gen: A&Ox3, NAD  HEENT: Moist mucus membranes, no scleral icterus.  Lungs: no respiratory distress      LABS:  External Order Results on 12/30/2022   Component Date Value Ref Range Status    Sodium (External) 12/30/2022 137  136 - 146 mmol/L Final    Potassium (External) 12/30/2022 4.1  3.5 - 5.1 mmol/L Final    Chloride (External) (External) 12/30/2022 97 (L)  98 - 107 mmol/L Final    CO2 (External) 12/30/2022 27  22 - 29 mmol/L Final    Anion Gap (External) 12/30/2022 17.1  10.0 - 20.0 mmol/L Final    Creatinine (External) 12/30/2022 4.34  mg/dL Final    Urea Nitrogen (External) 12/30/2022 43.6 (H)  10.0 - 20.0 mg/dL Final    BUN/Creatinine Ratio (External) 12/30/2022 10.05 (L)  11.70 - 22.90 ratio Final    Calcium (External) 12/30/2022 9.5  8.6 - 10.5 mg/dL Final    Glucose (External) 12/30/2022 102 (H)  70 - 100 mg/dL Final    GFR " Estimated (External) 12/30/2022 11 (L)  >=60 ml/min/1.73m2 Final    Magnesium (External) 12/30/2022 2.5  1.8 - 2.6 mg/dL Final    WBC Count (External) 12/30/2022 4.7  10(3)/uL Final    RBC Count (External) 12/30/2022 2.81  10(6)/uL Final    Hemoglobin (External) 12/30/2022 10.3  g/dL Final    Hematocrit (External) 12/30/2022 31.1  % Final    MCV (External) 12/30/2022 110.7  fL Final    MCH (External) 12/30/2022 36.5  pg Final    MCHC (External) 12/30/2022 33.0  32.0 - 36.0 g/dL Final    RDW (External) 12/30/2022 16.2  % Final    Platelet Count (External) 12/30/2022 251  179 - 450 10(3)/uL Final         IMAGING:  EXAM:  NM GASTRIC EMPTYING, 1/15/2024 1:31 PM     HISTORY: 3 month post gastric poem, f/up gastroparesis; Gastroparesis     TECHNIQUE: The patient ingested 2 mCi of Tc-99m sulfur colloid in 2  eggs, 2 pieces toast with jam and water.. Static images were acquired  at approximately 1 hour intervals out through 4 hours using a dual  head gamma camera in an anterior and posterior position. The  calculation of emptying was based on dual head imaging with geometric  mean calculations.  A Linear square fit was calculated to the emptying  curve to determine the amount of residual activity at each time point.     COMPARISON: 1/2/2023     FINDINGS:   Percent retention at 60 min = 93%.  Percent retention at 120 min = 93%.  Percent retention at 180 min = 89%.  Percent retention at 240 min = 86%.     T 1/2 emptying time =N/A                                                                      IMPRESSION: Delayed gastric emptying (see normal ranges below). It is  worse than 1/2/2023. Now the percent retention at 4 hours is 86%,  before on 1/2/2023 75%.     EXAM:  NM GASTRIC EMPTYING, 1/2/2023 1:21 PM  HISTORY: Gastroparesis; Lung replaced by transplant (H)     TECHNIQUE: The patient ingested 2.1 mCi of Tc-99m sulfur colloid in 2  eggs, 2 pieces of toast w/ jelly. Static images were acquired at  approximately 1 hour  intervals out through 4 hours using a dual head  gamma camera in an anterior and posterior position. The calculation of  emptying was based on dual head imaging with geometric mean  calculations.  A Linear square fit was calculated to the emptying  curve to determine the amount of residual activity at each time point.     FINDINGS:   Percent retention at 60 min = 87%.  Percent retention at 120 min = 77%.  Percent retention at 180 min = 77%.  Percent retention at 240 min = 75%.     T 1/2 emptying time =  238 mins.                                                                      IMPRESSION: Delayed gastric emptying    EXAM:  NM GASTRIC EMPTYING, 9/30/2022 12:32 PM     HISTORY: severe delayed gastric emptying for follow up; is off  dialysis. NPO weds night     TECHNIQUE: The patient ingested 2.3 mCi of Tc-99m sulfur colloid in in  2 eggs, 1 slices of toast with jelly, and a glass of water. Static  images were acquired at approximately 1 hour intervals out through 4  hours using a dual head gamma camera in an anterior and posterior  position. The calculation of emptying was based on dual head imaging  with geometric mean calculations.  A Linear square fit was calculated  to the emptying curve to determine the amount of residual activity at  each time point.     FINDINGS:   Percent retention at 60 min = 97%.  Percent retention at 120 min = 95%.  Percent retention at 180 min = 91%.  Percent retention at 240 min = 91%.     T 1/2 emptying time =  Too long to calculate.                                                                      IMPRESSION: Severe delayed gastric emptying    Endoscopies:  Upper GI Endoscopy 10/09/2023 11:50 AM [address] [address] [phone]     Endoscopy Department  _______________________________________________________________________________  Patient Name: Sofie Rodriguez            Procedure Date: 10/9/2023 11:50  SYLVIA  MRN: [id number]                       Account Number: [id number]  YOB: 1960               Admit Type: Outpatient  Age: 63                               Room:  OR   Gender: Female                        Note Status: Supervisor Override  Attending MD: OSKAR PÉREZ MD,      Pause for the Cause: time out performed  Total Sedation Time:                    _______________________________________________________________________________     Procedure:             Upper GI endoscopy  Indications:           For therapy of gastroparesis; 63 year old female with                         a PMHx of 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 Diff, h/o EBV                         viremia, ESRD on dialysis, hemobilia s/p ERCP                         presumably due to hemorrhage into gallbladder,                         gastroparesis s/p PEGJ and diarrhea related to SIBO.                         Responded previously to pyloric botox. Patient here                         for G-POEM. GCSI= 3,0,0,3,2,2,3,4,4=21  Providers:             OSKAR PÉREZ MD  Referring MD:            Requesting Provider:   MICHELE SOSA MD  Medicines:             General Anesthesia, IV Zosyn 3.375g  Complications:         No immediate complications. Estimated blood loss:                         Minimal.  _______________________________________________________________________________  Procedure:             Pre-Anesthesia Assessment:                         - Prior to the procedure, a History and Physical was                         performed, and patient medications and allergies were                         reviewed. The patient is competent. The risks and                         benefits of the procedure and the sedation options and                         risks were discussed with the  patient. All questions                         were answered and informed consent was obtained.                         Patient identification and proposed procedure were                         verified by the physician, the nurse, the                         anesthesiologist and the anesthetist in the procedure                         room. Mental Status Examination: alert and oriented.                         Airway Examination: normal oropharyngeal airway and                         neck mobility. Respiratory Examination: clear to                         auscultation. CV Examination: normal. Prophylactic                         Antibiotics: The patient requires prophylactic                         antibiotics peroral endoscopic myotomy. Prior                         Anticoagulants: The patient has taken no anticoagulant                         or antiplatelet agents. ASA Grade Assessment: III - A                         patient with severe systemic disease. After reviewing                         the risks and benefits, the patient was deemed in                         satisfactory condition to undergo the procedure. The                         anesthesia plan was to use general anesthesia.                         Immediately prior to administration of medications,                         the patient was re-assessed for adequacy to receive                         sedatives. The heart rate, respiratory rate, oxygen                         saturations, blood pressure, adequacy of pulmonary                         ventilation, and response to care were monitored                         throughout the procedure. The physical status of the                         patient was re-assessed after the procedure.                         After obtaining informed consent, the endoscope was                         passed under direct vision. Throughout the procedure,                         the patient's blood pressure,  pulse, and oxygen                         saturations were monitored continuously. The Endoscope                         was introduced through the mouth, and advanced to the                         second part of duodenum. The was introduced through                         the mouth, and advanced to the. After obtaining                         informed consent, the endoscope was passed under                         direct vision. Throughout the procedure, the patient's                         blood pressure, pulse, and oxygen saturations were                         monitored continuously.The endoscopic myotomy was                         accomplished without difficulty. The patient tolerated                         the procedure well.                                                                                  Findings:       The esophagus was normal.       There was evidence of an intact gastrostomy with a patent GJ-tube       present in the gastric body extending into the duodneum. This was       characterized by healthy appearing mucosa. Small amount of retained food       and fluid in the stomach.       The examined duodenum was normal.       Localized moderate mucosal changes characterized by nodularity were       found at the pylorus at the 11 o'clock position. Likely hyperplastic       from GJ tube.       Preparations were made for gastric peroral endoscopic myotomy (G-POEM).       The stomach was cleaned and irrigated using saline and suctioned.       Mucosotomy followed by myotomy were performed in a greater curvature       orientation. First, a submucosal injection of a solution of methylene       blue and saline was used to lift the mucosa at the site of the initial       mucosotomy. The initial mucosal incision was made transversely at       approximately 4.0 cm proximal to the pylorus using a HybridKnife I-Type.       Next, the endoscope with a clear cap was used to enter into the       submucosal  tunnel. The submucosal tunnel was then further created by       dissection of the submucosal fibers distally to the pyloric ring. A full       thickness myotomy (in a greater curvature orientation) was then       performed beginning at the pylorus using an ITknife2. The myotomy was       extended to 3.0 cm proximal to the pylorus. Intra procedure bleeding was       mild. A small Coagrasper was used effectively for hemostasis. After       completion of the myotomy, examination of the stomach and duodenum       showed no inadvertent mucosotomy. There was no evidence of bleeding       noted on the inspection of the myotomy edges and submucosal tunnel. The       myotomy was successfully performed. The mucosal entrance to the       submucosal tunnel was closed using endoscopic suturing. After G-POEM and       endoscope removal, the patient was examined. The patient's abdomen was       nondistended. Pylorus lumen open without resistance to passage.                                                                                   Impression:            - Normal esophagus.                         - GJ tube in place and not manipulated                         - Normal examined duodenum.                         - Nodularity was found at the pylorus at the 11                         o'clock position. Likely hyperplastic from GJ tube.                         - Gastric peroral endoscopic myotomy (G-POEM) was                         performed along greater curvature as described above.                         Mucosotomy closed with endoscopic suturing.                         - No specimens collected.  Recommendation:        - Monitor in PACU and admit to medicine for                         observation.                         - NPO overnight. Leave G-tube to gravity and can start                         J-tube feeds tonight as tolerated                         - Started pantoprazole 40 mg IV BID.                         - Upon  discharge, prescribe pantoprazole 40 mg PO BID                         x1-month.                         - Continue Zosyn IV while inpatient.                         - Patient had a positive breath test for SIBO but                         insurance denied Rifaximin 550 mg TID x 2 weeks.                         Consider attempting re-prescribing on discharge as did                         not respond to course of cipro. Does not need                         additional antibiotics related to procedure.                         - No anticoagulation or antiplatelets for 3-days.                         - Ordered head of bed elevation to 30-45 degrees.                         - Ordered incentive spirometry.                         - Order analgesia IV & antiemetics IV PRN.                         - Ordered CBC & BMP tomorrow AM                         - Ordered Upper GI series tomorrow AM to rule-out                         post-POEM gastric leak. Pneumoperitoneum expected.                         - If no gastric leak, and otherwise doing clinically                         well discharge home tomorrow and start full liquid diet                         - Liquid diet for three days starting tomorrow. Soft                         diet on day 4, 5,6. Regular diet starting on POD#7 as                         tolerated                         - Virtual follow-up with Dr. Navarro in 1-month.                                                                                          Again, thank you for allowing me to participate in the care of your patient.      Sincerely,    Gonzalez Navarro MD

## 2024-01-25 NOTE — PATIENT INSTRUCTIONS
You will find a brief summary of your discussion and care plan from today's visit below.  Dr Navarro has outlined the following steps after your recent clinic visit:    Follow up visit in ~ 2 months    Please call with any questions or concerns regarding your clinic visit today.     It is a pleasure being involved in your health care.     Contacts post-consultation depending on your need:     Schedule Clinic Appointments                        [phone], option 1    Teresa Vogel RN Care Coordinator           [phone]     Jalen Jackson OR                           [phone]     GI Procedure Scheduling                               [phone], option 2     For urgent/emergent questions after business hours, you may reach the on-call GI Fellow by contacting the [facility]  at [phone].    How to I schedule a follow-up visit?  If you did not schedule a follow-up visit today, please call [phone] option #1 to schedule a follow-up office visit.       How do I schedule labs, imaging studies, or procedures that were ordered in clinic today?      Labs: To schedule lab appointment at the [facility], use my chart or call [phone]. If you have a [place] lab closer to home where you are regularly seen you can give them a call.      Procedures: If a colonoscopy, upper endoscopy, breath test, esophageal manometry, or pH impedence was ordered today, our endoscopy team will call you to schedule this. If you have not heard from our endoscopy team within a week, please call [phone] to schedule.      Imaging Studies: If you were scheduled for a CT scan, X-ray, MRI, ultrasound, HIDA scan or other imaging study, please call [phone] to have this scheduled.      Referral: If a referral to another specialty was ordered, expect a phone call or follow instructions above. If you have not heard from anyone regarding your referral in a week, please call our clinic  to check the status.     I recommend signing up for [employer] access if you have not already done so and are comfortable with using a computer.  This allows for online access to your lab results and also helps you communicate efficiently with the clinic should any questions arise in your care.

## 2024-01-29 ENCOUNTER — TELEPHONE (OUTPATIENT)
Dept: TRANSPLANT | Facility: CLINIC | Age: 64
End: 2024-01-29
Payer: MEDICARE

## 2024-01-29 NOTE — TELEPHONE ENCOUNTER
"Called Sofie to check in on TF tolerance. Unable to connect via phone. Sent repeat [employer] message to get an update. Still awaiting reply from Sofie. Last we talked our temporary \"goal\" is 2 cartons Vivonex RTF to test tolerance.     DW: 47 kg  Estimated Nutrition Needs  Calories: 9018-7553 calories/day (30-35 kcal/kg) for wt gain  Protein: 56-66 grams/day (1.2-1.4 g/kg) for dialysis needs    2 cartons Vivonex provides 500 sudha/day, 25 g protein (goal is 20 ml/hr x 24 h)    Ideal TF needs (if tolerance were no issue): 6 cartons/day for 1500 calories and 75 g protein. This would be run over 24 hours at a rate of 63 ml/hr.    Will attempt to touch base with Sofie again.  "

## 2024-02-01 ENCOUNTER — LAB (OUTPATIENT)
Dept: LAB | Facility: CLINIC | Age: 64
End: 2024-02-01

## 2024-02-01 DIAGNOSIS — Z94.2 S/P LUNG TRANSPLANT (H): ICD-10-CM

## 2024-02-01 PROCEDURE — 80197 ASSAY OF TACROLIMUS: CPT | Performed by: PHYSICIAN ASSISTANT

## 2024-02-02 ENCOUNTER — TELEPHONE (OUTPATIENT)
Dept: TRANSPLANT | Facility: CLINIC | Age: 64
End: 2024-02-02
Payer: MEDICARE

## 2024-02-02 LAB
TACROLIMUS BLD-MCNC: 23 UG/L (ref 5–15)
TME LAST DOSE: ABNORMAL H
TME LAST DOSE: ABNORMAL H

## 2024-02-03 ENCOUNTER — TELEPHONE (OUTPATIENT)
Dept: TRANSPLANT | Facility: CLINIC | Age: 64
End: 2024-02-03
Payer: MEDICARE

## 2024-02-03 NOTE — TELEPHONE ENCOUNTER
TRIAGE -   Pt send MyChart message that she cannot confirm whether she took her tac prior to her blood draw or not.   Consulted primary TC - agreed to recheck on Wed as scheduled. No dose change.     Called pt - No answer. LVM for her to check her My Chart message.

## 2024-02-03 NOTE — TELEPHONE ENCOUNTER
"TRIAGE - LVM for pt re MyChart message and lab results. Called daughter - she is on her way to see mom/pt now. She will review MyC message with her, respond or call with questions.     Daughter reports pt has \"no drive\". Weak, not eating, only used her feeding tube twice last week. [facility] 102#. She is trying to enc her to eat.     Enc to call as needed.       "

## 2024-02-05 NOTE — TELEPHONE ENCOUNTER
Tacrolimus level 23 on 2/1.  Pt took dose prior to lab draw.  No dose change.  Plan to repeat tacrolimus level on 2/7.  BiiCode message sent to pt to confirm plan.

## 2024-02-05 NOTE — TELEPHONE ENCOUNTER
Tacrolimus level 23 on 2/1/24. VM x2 left for patient asking for return call. M[employer]banget message sent to patient.     On-call coordinator to follow up Saturday 2/3

## 2024-02-07 ENCOUNTER — DOCUMENTATION ONLY (OUTPATIENT)
Dept: TRANSPLANT | Facility: CLINIC | Age: 64
End: 2024-02-07

## 2024-02-07 ENCOUNTER — ANCILLARY PROCEDURE (OUTPATIENT)
Dept: CT IMAGING | Facility: CLINIC | Age: 64
End: 2024-02-07
Attending: PHYSICIAN ASSISTANT
Payer: MEDICARE

## 2024-02-07 ENCOUNTER — OFFICE VISIT (OUTPATIENT)
Dept: PULMONOLOGY | Facility: CLINIC | Age: 64
End: 2024-02-07
Attending: PHYSICIAN ASSISTANT
Payer: MEDICARE

## 2024-02-07 ENCOUNTER — ANCILLARY PROCEDURE (OUTPATIENT)
Dept: GENERAL RADIOLOGY | Facility: CLINIC | Age: 64
End: 2024-02-07
Attending: PHYSICIAN ASSISTANT
Payer: MEDICARE

## 2024-02-07 ENCOUNTER — LAB (OUTPATIENT)
Dept: LAB | Facility: CLINIC | Age: 64
End: 2024-02-07
Attending: PHYSICIAN ASSISTANT
Payer: MEDICARE

## 2024-02-07 ENCOUNTER — LAB (OUTPATIENT)
Dept: LAB | Facility: CLINIC | Age: 64
End: 2024-02-07
Payer: MEDICARE

## 2024-02-07 VITALS
BODY MASS INDEX: 18.23 KG/M2 | DIASTOLIC BLOOD PRESSURE: 69 MMHG | OXYGEN SATURATION: 95 % | WEIGHT: 102.9 LBS | HEART RATE: 88 BPM | HEIGHT: 63 IN | SYSTOLIC BLOOD PRESSURE: 128 MMHG

## 2024-02-07 DIAGNOSIS — Z94.2 LUNG REPLACED BY TRANSPLANT (H): ICD-10-CM

## 2024-02-07 DIAGNOSIS — Z94.2 S/P LUNG TRANSPLANT (H): ICD-10-CM

## 2024-02-07 DIAGNOSIS — Z94.2 S/P LUNG TRANSPLANT (H): Primary | ICD-10-CM

## 2024-02-07 DIAGNOSIS — Z94.2 LUNG REPLACED BY TRANSPLANT (H): Primary | ICD-10-CM

## 2024-02-07 LAB
ALBUMIN SERPL BCG-MCNC: 3.5 G/DL (ref 3.5–5.2)
ALP SERPL-CCNC: 67 U/L (ref 40–150)
ALT SERPL W P-5'-P-CCNC: 7 U/L (ref 0–50)
ANION GAP SERPL CALCULATED.3IONS-SCNC: 9 MMOL/L (ref 7–15)
AST SERPL W P-5'-P-CCNC: 16 U/L (ref 0–45)
BILIRUB SERPL-MCNC: 0.3 MG/DL
BUN SERPL-MCNC: 22.9 MG/DL (ref 8–23)
CALCIUM SERPL-MCNC: 8.7 MG/DL (ref 8.8–10.2)
CHLORIDE SERPL-SCNC: 106 MMOL/L (ref 98–107)
CMV DNA SPEC NAA+PROBE-ACNC: NOT DETECTED IU/ML
CREAT SERPL-MCNC: 3.89 MG/DL (ref 0.51–0.95)
DEPRECATED HCO3 PLAS-SCNC: 26 MMOL/L (ref 22–29)
EGFRCR SERPLBLD CKD-EPI 2021: 12 ML/MIN/1.73M2
ERYTHROCYTE [DISTWIDTH] IN BLOOD BY AUTOMATED COUNT: 14.3 % (ref 10–15)
EXPTIME-PRE: 3.01 SEC
FEF2575-%PRED-PRE: 122 %
FEF2575-PRE: 2.5 L/SEC
FEF2575-PRED: 2.04 L/SEC
FEFMAX-%PRED-PRE: 67 %
FEFMAX-PRE: 4.08 L/SEC
FEFMAX-PRED: 6.05 L/SEC
FEV1-%PRED-PRE: 51 %
FEV1-PRE: 1.13 L
FEV1FEV6-PRE: 96 %
FEV1FEV6-PRED: 80 %
FEV1FVC-PRE: 96 %
FEV1FVC-PRED: 80 %
FIFMAX-PRE: 3.27 L/SEC
FVC-%PRED-PRE: 42 %
FVC-PRE: 1.19 L
FVC-PRED: 2.77 L
GLUCOSE SERPL-MCNC: 145 MG/DL (ref 70–99)
HCT VFR BLD AUTO: 29.6 % (ref 35–47)
HGB BLD-MCNC: 8.9 G/DL (ref 11.7–15.7)
MCH RBC QN AUTO: 36 PG (ref 26.5–33)
MCHC RBC AUTO-ENTMCNC: 30.1 G/DL (ref 31.5–36.5)
MCV RBC AUTO: 120 FL (ref 78–100)
PLATELET # BLD AUTO: 165 10E3/UL (ref 150–450)
POTASSIUM SERPL-SCNC: 3.6 MMOL/L (ref 3.4–5.3)
PROT SERPL-MCNC: 5.8 G/DL (ref 6.4–8.3)
RBC # BLD AUTO: 2.47 10E6/UL (ref 3.8–5.2)
SODIUM SERPL-SCNC: 141 MMOL/L (ref 135–145)
WBC # BLD AUTO: 4.6 10E3/UL (ref 4–11)

## 2024-02-07 PROCEDURE — G0463 HOSPITAL OUTPT CLINIC VISIT: HCPCS | Performed by: PHYSICIAN ASSISTANT

## 2024-02-07 PROCEDURE — 99417 PROLNG OP E/M EACH 15 MIN: CPT | Performed by: PHYSICIAN ASSISTANT

## 2024-02-07 PROCEDURE — 74177 CT ABD & PELVIS W/CONTRAST: CPT | Mod: MG | Performed by: RADIOLOGY

## 2024-02-07 PROCEDURE — 71260 CT THORAX DX C+: CPT | Mod: MG | Performed by: RADIOLOGY

## 2024-02-07 PROCEDURE — 87799 DETECT AGENT NOS DNA QUANT: CPT | Performed by: PHYSICIAN ASSISTANT

## 2024-02-07 PROCEDURE — 85027 COMPLETE CBC AUTOMATED: CPT | Performed by: PATHOLOGY

## 2024-02-07 PROCEDURE — 36415 COLL VENOUS BLD VENIPUNCTURE: CPT | Performed by: PATHOLOGY

## 2024-02-07 PROCEDURE — 94375 RESPIRATORY FLOW VOLUME LOOP: CPT | Performed by: PHYSICIAN ASSISTANT

## 2024-02-07 PROCEDURE — 71046 X-RAY EXAM CHEST 2 VIEWS: CPT | Performed by: RADIOLOGY

## 2024-02-07 PROCEDURE — 80053 COMPREHEN METABOLIC PANEL: CPT | Performed by: PATHOLOGY

## 2024-02-07 PROCEDURE — 99215 OFFICE O/P EST HI 40 MIN: CPT | Mod: 25 | Performed by: PHYSICIAN ASSISTANT

## 2024-02-07 PROCEDURE — G1010 CDSM STANSON: HCPCS | Mod: GC | Performed by: RADIOLOGY

## 2024-02-07 PROCEDURE — 86833 HLA CLASS II HIGH DEFIN QUAL: CPT | Performed by: PHYSICIAN ASSISTANT

## 2024-02-07 PROCEDURE — 86832 HLA CLASS I HIGH DEFIN QUAL: CPT | Performed by: PHYSICIAN ASSISTANT

## 2024-02-07 PROCEDURE — 99000 SPECIMEN HANDLING OFFICE-LAB: CPT | Performed by: PATHOLOGY

## 2024-02-07 RX ORDER — IOPAMIDOL 755 MG/ML
62 INJECTION, SOLUTION INTRAVASCULAR ONCE
Status: COMPLETED | OUTPATIENT
Start: 2024-02-07 | End: 2024-02-07

## 2024-02-07 RX ORDER — LIDOCAINE 40 MG/G
CREAM TOPICAL
Status: CANCELLED | OUTPATIENT
Start: 2024-02-07

## 2024-02-07 RX ADMIN — IOPAMIDOL 62 ML: 755 INJECTION, SOLUTION INTRAVASCULAR at 13:20

## 2024-02-07 ASSESSMENT — PAIN SCALES - GENERAL: PAINLEVEL: NO PAIN (0)

## 2024-02-07 NOTE — PROGRESS NOTES
"Received message from Kaylin Triana PA-C, after her visit with Sofie today. Wt is down to 102 lbs (Sofie had informed us earlier this week that at home it had been <100 lbs). She has struggled with weight gain or even stability.   We recently tried her on a short-term Vivonex RTF trial due to poor tolerance of low rate semi-elemental formulas in the past (see prior notes for rate details, etc). In summary, for many mos, she has either not run her TF or been running the equivalent of up to 300 calories/day + eating 1 meal (which would be 1 boiled egg). Fecal elastase normal. Main barriers that we can determine for poor PO/TF tolerance are severe GP, N/V/D.     Plan to admit pt within the next week to start TPN/lipids. Dialysis unit OK with this due to hopeful plan to cycle TPN overnight to not interfere w/ dialysis. I am concerned for possible refeeding risk. Inpatient RD to assess/follow with initiation of TPN and electrolyte changes.   Although there may be pros to continuing trickle feeds while on goal TPN in order to maintain some sort of gut integrity, unsure if this really will be helpful or realistic, as Sofie has admitted to not running feeds as directed. Maybe a \"bowel rest\" sort of situation with oral intake for pleasure (expected to be minimal) may be helpful.     OP RD will continue to follow as needed.  "

## 2024-02-07 NOTE — PROGRESS NOTES
Transplant Coordinator Note    Reason for visit: Post lung transplant follow up visit   Coordinator: Present   Caregiver:  None     Health concerns addressed today:  1. Tacrolimus goal range decreased to 7-9.   2. Left arm swollen. Pt reports this gets better and worse. Pt has her dialysis fistula in the left side and dialysis has looked at this.   3. Diarrhea up to 2-3 times daily.   4. Pt has only been able to do 1 carton of tube feeds daily as she does not feel well.   5. Chest/ab/Pelvis CT with Contrast today.   6. Weight loss- will put in for admission to initiate TPN.   7. May hold Dapsone pending lab results.   8.     Activity/rehab: pt was supposed to start pulm rehab, but was not feeling well.   Oxygen needs: none   Pain management/RX: tylenol as needed.   Diabetic management: NA   PJP prophylactic:     COVID:  COVID-19 infection (yes/no, date of most recent positive test):   Status/instructions given about COVID-19 vaccine:     Pt Education: medications (use/dose/side effects), how/when to call coordinator, frequency of labs, s/s of infection/rejection, call prior to starting any new medications, lab/vital sign book    Health Maintenance:   Last colonoscopy:   Next colonoscopy due:   Dermatology:  Vaccinations this visit:     Labs, CXR, PFTs reviewed with patient  Medication record reviewed and reconciled  Questions and concerns addressed    Patient Instructions  1. Continue to hydrate with 60-70 oz fluids daily.  2. Continue to exercise daily or most days of the week.  3. Follow up with your primary care provider for annual gender health maintenance procedures.  4. Follow up with colonoscopy schedule.  5. Follow up with annual dermatology visits.  6. It doesn't seem like the COVID vaccine is working well in lung transplant patients. A number of lung transplant patients have gotten sick with COVID even after receiving the vaccines. Based on our recent experience, it can be life-threatening to get COVID   even after being vaccinated. Please continue to act like you did not get the COVID vaccine - social distancing, wearing a mask, good hand hygiene, etc. If the people around you are vaccinated, it will help reduce the risk of you getting COVID. All members of your household should be vaccinated.  7. Chest Ab Pelvis CT today.   8. We want you to be admitted for initiation of TNP to help with your nutrition. We will set up an admission. It may take a few days to get you a bed in the hospital. The hospital will call you when they have a bed ready.   9. Continue to get as much of the tube feeds as you can tolerate.       Next transplant clinic appointment:  TBD after hospital admission.   Next lab draw: today.     AVS printed at time of check out

## 2024-02-07 NOTE — PROGRESS NOTES
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  Reason for requested transfer: Patient has established care here   Isolation Needs: None    Care everywhere has been updated and reviewed: Yes  Necessary images have been sent through PACS: Yes    If patient is transferring for specialty care or specific procedure, the specialist required has participated in the transfer call and agreed with need for transfer and anticipated timeline: Yes, Provider name: Kaylin Triana PA-C specialty with: Lung Transplant    Transfer accepted: Yes  Stability of Patient: Patient is vitally stable, with no critical labs, and will likely remain stable throughout the transfer process  Is the patient appropriate for [facility]? No, What specific [place] needs are anticipated? 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     Natanael Worthington MD

## 2024-02-07 NOTE — PROGRESS NOTES
Bronchoscopy with lavage, no biopsies   Date of transplant 6/28/2022   Transplant type bilateral lung  Date of labs 2/7/24  BUN 22.9 DDAVP No, no biopsies   Plt 165  INR NA- pt will not be getting biopsies done   Comment Please page Kaylin Triana with any questions/concerns [phone]

## 2024-02-07 NOTE — LETTER
2/7/2024         RE: Sofie Rodriguez  [address] [place] [address]        Dear Colleague,    Thank you for referring your patient, Sofie Rodriguez, to the [facility] [place]. Please see a copy of my visit note below.    [facility]  February 7, 2024         Assessment and Plan:   Sofie Rodriguez is a 63 year old female with h/o bilateral lung transplant for COPD on 6/28/22 with course complicated by post-operative hemidiaphragm palsy, recurrent PNAs, positive DSA, EBV viremia, hypogammaglobulinemia, severe gastroparesis s/p G/J tube placement 7/27/22 with pyloric botox 1/25/23, GI bleed 2/2 pyloric ulcer, hemobilia s/p ERCP and MRCP, chronic diarrhea, recurrent C diff colitis, and ESRD on HD. Admitted May 2023 for FTT, supposed to be readmitted in July for FTT, but refused. Admitted to OSH 12/4-12/31 in December for right hip fracture s/p ORIF, now with significant deconditioning and ongoing severe malnutrition with gastroparesis, small bowel hypermotility and failure to tolerate any tube feeds. After extensive discussion with the dietician, GI and ultimately convincing the patient, she will be admitted or severe protein calorie malnutrition, FTT and initiation of TPN/lipids.     1. Severe protein calorie malnutrition:  FTT:   Gastroparesis s/p PEG/J s/p botox and G-POEM:   SB Hypomotility  Pyloric ulcer:  Chronic nausea and osmotic diarrhea:  SIBO s/p rifaximin:   Recurrent C diff colitis: chronic diarrhea since transplant with recurrent episodes of C diff. GI previously consulted, felt stools consistent with osmotic diarrhea. Over the last year has lost 40 lbs, unable to tolerate any combination of TF, most recently elemental formula. Normal fecal elastase last May. Following with Dr. Navarro who feels her main two issues are vagal injury induced gastroparesis and probably small bowel  hypomotility. Her intolerance to J-tube feeds suggests the latter to be a significant issue (notes in a message that there are no motility experts at the  and he is limited in what can be offered). He is in agreement that TPN is likely her only options. Overall, her care has also been compromised by difficulty contacting patient (dead phone, no My Chart follow up etc)  - Continue imodium BID, pantoprazole  - CT chest/abdomen/pelvis with/without contrast today to assess for other pathology (last in March 2023)  - At this time, her stools are stable to maybe slightly improved (could be improved from lack of oral or TF intake), do not feel stool studies necessary  - Please consult dietician and GI upon admission--confirmed with dialysis as OP that TPN is compatible, will need like, likely port (tentatively plan to cycle custom TPN overnight X 12 hours, which would not interfere with dialysis)    2. S/p bilateral lung transplant:   Right hemidiaphragm palsy:   Suspected CARLEE: was doing minimal walking prior to her fall in December, denies new pulmonary complaints today, was supposed to start PT for her hip, but felt sick on Monday. Severely deconditioned, complicated by above. Sating 95% on room air. CMV 1/19 negative. ImmuKnow 108 and cfDNA 0.12 on 1/10. CT chest completed after the visit after the last visit demonstrated stable left upper posterior opacity, though to be atelectasis based on prior conversations with few patchy areas of LLL. CXR reviewed today and demonstrates slight increase in left effusion with mild edema (patient notes she is starting on a diuretic as well as HD). PFTs unchanged from last check (but ATS not med), remain significantly below her baseline (1.4-1.5L). Suspect decline in pulmonary function related to severe deconditioning, inability to tolerate activity, complicated by the above.   - Schedule follow up with sleep to discuss possible CPAP, given recommendations from August sleep study  -  "Continue IS including tacrolimus (decrease goal to 7-9) and prednisone  - Dapsone qMWF for PJP ppx     3. Positive DSA: no prior treatment for AMR, has been watched for quite some time given no pulmonary symptoms, prior PFTs stability and significant and ongoing failure to thrive. Last DSA improved to 5723, however will ongoing lower PFTs, may need to consider AMR treatment, however, unclear how she would tolerate.      4. EBV viremia: last level of 76K (log 4.9) on 1/10  - EBV pending for today     5. CKD:   HTN:   LUE edema: BP overall controlled. Doing dialysis T/Th/Sat. Notes she has had intermittent and significant swelling in her left arm where fistula is, thinks she may need a fistulogram.  Transplant pre evaluation is currently on hold until we can improve her GI issues/nutrition/weight gain and and now her pulmonary function.    Excluding time spent reading PFTs, I have spent > 60 minutes on the day of encounter reviewing the patient's chart including OSH notes, vitals, labs, imaging and medications and formulating plan of care with the patient and coordinator.     Signed out to triage for bed watlist.     Kaylin Triana PA-C  Pulmonary, Allergy, Critical Care and Sleep Medicine        Interval History:     Has not started PT for her hip, was supposed to start this past Monday, but got sick. Breathing is okay, but notes she is very tired, worn out and cold. No fever or chills, no sinus or nasal congestion, no allergies symptoms. No cough, tightness or congestion.     Sofie notes \"miraculously\" her diarrhea is 50% better, started the beginning of the year. She is unsure what improved her stools. Some days she won't have any stools, occasionally 1 times/day and when she has diarrhea 2-3 times/day and \"mush to liquid.\" Was taking imodium 3 yolanda/day, taking it 2 times/day. Since talking to Kera on 1/18, patient has only down 5 cartons total. Is she isn't feeling good with nausea and stomach cramps, she doesn't " hook it up her tube feeds.           Review of Systems:   Please see HPI, otherwise the complete 10 point ROS is negative.           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    Hepatitis 2017    Hep C, [facility]    History of blood transfusion     HTN (hypertension)     Infectious mononucleosis     Lung infection 11/30/2022    Osteopenia      Past Surgical History:   Procedure Laterality Date    BRONCHOSCOPY (RIGID OR FLEXIBLE), DIAGNOSTIC N/A 08/02/2022    Procedure: BRONCHOSCOPY, DIAGNOSTIC- inspection Bronch;  Surgeon: Kamala Lovell MD;  Location: [facility]    BRONCHOSCOPY (RIGID OR FLEXIBLE), DIAGNOSTIC N/A 09/13/2022    Procedure: INSPECTION BRONCHOSCOPY, WITH BRONCHOALVEOLAR LAVAGE;  Surgeon: Jose R Mccullough MD;  Location: [facility]    BRONCHOSCOPY (RIGID OR FLEXIBLE), DIAGNOSTIC N/A 11/09/2022    Procedure: BRONCHOSCOPY, WITH BRONCHOALVEOLAR LAVAGE AND BIOPSY;  Surgeon: Cesar Lima MD;  Location: [facility]    BRONCHOSCOPY (RIGID OR FLEXIBLE), DIAGNOSTIC N/A 01/25/2023    Procedure: BRONCHOSCOPY, WITH BRONCHOALVEOLAR LAVAGE AND BIOPSY;  Surgeon: Mason Reddy MD;  Location: [facility]    BRONCHOSCOPY (RIGID OR FLEXIBLE), DIAGNOSTIC N/A 04/19/2023    Procedure: BRONCHOSCOPY, WITH BRONCHOALVEOLAR LAVAGE AND BIOPSY;  Surgeon: Kamala Lovell MD;  [facility]: [facility]    BRONCHOSCOPY (RIGID OR FLEXIBLE), DIAGNOSTIC N/A 07/12/2023    Procedure: BRONCHOSCOPY, WITH BRONCHOALVEOLAR LAVAGE AND BIOPSY;  Surgeon: Cesar Lima MD;  [facility]: [facility]    BRONCHOSCOPY FLEXIBLE AND RIGID N/A 07/19/2022    Procedure: BRONCHOSCOPY inspection only;  Surgeon: Bob Liao MD;  [facility]: [facility]    COLONOSCOPY  2015    CORONARY ANGIOGRAPHY ADULT ORDER      CV CORONARY  ANGIOGRAM N/A 06/30/2021    Procedure: CV CORONARY ANGIOGRAM;  Surgeon: Alexander Cuellar MD;  Location:  HE[facility] CARDIAC CATH LAB    CV RIGHT HEART CATH MEASUREMENTS RECORDED N/A 06/30/2021    Procedure: CV RIGHT HEART CATH;  Surgeon: Alexander Cuellar MD;  Location:  HEART CARDIAC CATH LAB    ENDOSCOPIC PERORAL MYOTOMY N/A 10/09/2023    Procedure: MYOTOMY, ESOPHAGUS, ENDOSCOPIC, ORAL APPROACH;  Surgeon: Gonzalez Navarro MD;  [facility]    ENDOSCOPIC RETROGRADE CHOLANGIOPANCREATOGRAM N/A 08/11/2022    Procedure: ENDOSCOPIC RETROGRADE CHOLANGIOPANCREATOGRAPHY WITH PANCREATIC DUCT NEEDLE KNIFE AND STENT PLACEMENT, BILE DUCT SPHINCTEROTOMY, BLOOD/DEBRIS REMOVAL AND STENT PLACEMENT;  Surgeon: Cosmo Arroyo MD;  [facility]    ENDOSCOPIC RETROGRADE CHOLANGIOPANCREATOGRAM N/A 10/07/2022    Procedure: ENDOSCOPIC RETROGRADE CHOLANGIOPANCREATOGRAPHY with biliary and pancreatic stent removal, debris removal;  Surgeon: Cosmo Arroyo MD;  Location:  OR    ENT SURGERY  1974    tonsillectomy    ENTEROSCOPY SMALL BOWEL N/A 08/11/2022    Procedure: SMALL BOWEL ENTEROSCOPY;  Surgeon: Cosmo Arroyo MD;  Location:  OR    ESOPHAGOGASTRODUODENOSCOPY, WITH NASOGASTRIC TUBE INSERTION N/A 07/01/2022    Procedure: ESOPHAGOGASTRODUODENOSCOPY, WITH NASOJEJUNAL TUBE INSERTION;  Surgeon: Ozzy Nickerson MD;  Location:  GI    ESOPHAGOSCOPY, GASTROSCOPY, DUODENOSCOPY (EGD), COMBINED N/A 08/03/2022    Procedure: ESOPHAGOGASTRODUODENOSCOPY (EGD);  Surgeon: Ira Andres MD;  Location:  GI    ESOPHAGOSCOPY, GASTROSCOPY, DUODENOSCOPY (EGD), COMBINED N/A 01/25/2023    Procedure: ESOPHAGOGASTRODUODENOSCOPY (EGD) with botox injection;  Surgeon: Gonzalez Navarro MD;  Location:  GI    ESOPHAGOSCOPY, GASTROSCOPY, DUODENOSCOPY (EGD), COMBINED N/A 10/09/2023    Procedure: ESOPHAGOGASTRODUODENOSCOPY;  Surgeon: Gonzalez Navarro MD;  [facility]    HAND SURGERY      INSERT CHEST TUBE Right  09/13/2022    Procedure: Insert chest tube;  Surgeon: Jose R Mccullough MD;  [facility]: [facility]    IR CVC TUNNEL PLACEMENT > 5 YRS OF AGE  09/26/2022    IR GASTRO JEJUNOSTOMY TUBE CHANGE  08/31/2022    IR GASTRO JEJUNOSTOMY TUBE CHANGE  12/21/2022    IR GASTRO JEJUNOSTOMY TUBE CHANGE  07/12/2023    IR GASTRO JEJUNOSTOMY TUBE CHANGE  08/18/2023    IR GASTRO JEJUNOSTOMY TUBE CHANGE  11/14/2023    IR GASTRO JEJUNOSTOMY TUBE PLACEMENT  07/27/2022    IR THORACENTESIS  08/29/2022    LEEP TX, CERVICAL  04/07/2017    HECTOR III    LYMPH NODE BIOPSY Left 2005    Left axilla, benign- [place]    MIDLINE INSERTION - DOUBLE LUMEN Left 07/28/2022    20cm, Basilic vein    REPLACE GASTROJEJUNOSTOMY TUBE, PERCUTANEOUS  10/07/2022    Procedure: Replace Gastrojejunostomy Tube;  Surgeon: Cosmo Arroyo MD;  [facility]    THORACENTESIS Left 08/29/2022    Procedure: THORACENTESIS;  Surgeon: Bo Capone PA-C;  Location: [facility]    THORACENTESIS Left 09/13/2022    Procedure: Thoracentesis;  Surgeon: Jose R Mccullough MD;  [facility]    THROMBECTOMY UPPER EXTREMITY Left 07/02/2022    Procedure: LEFT RADIAL ARM THROMBECTOMY;  Surgeon: Christie Graham MD;  [facility]    TRANSPLANT LUNG RECIPIENT SINGLE X2 Bilateral 06/28/2022    Procedure: Clamshell Incision, Bilateral Sequential Lung Transplant, On Cardiopulmonary Bypass, Flexible Bronchoscopy;  Surgeon: Sue Sunshine MD;  Location:  OR           Family History:     No family history on file.         Social History:     Social History     Socioeconomic History    Marital status:      Spouse name: Not on file    Number of children: Not on file    Years of education: Not on file    Highest education level: Not on file   Occupational History    Not on file   Tobacco Use    Smoking status: Former     Packs/day: 0.50     Years: 30.00     Additional pack years: 0.00     Total pack years: 15.00     Types: Cigarettes     Quit date: 11/11/2020      "Years since quitting: 3.2    Smokeless tobacco: Never   Substance and Sexual Activity    Alcohol use: Not Currently     Comment: Stopped drinking in 2020    Drug use: Not Currently     Types: Marijuana, Methamphetamines     Comment: hx:marijuana and methamphetamine-quit both unsure ?  2-3 yrs ago    Sexual activity: Not on file   Other Topics Concern    Parent/sibling w/ CABG, MI or angioplasty before 65F 55M? Not Asked   Social History Narrative    Not on file     Social Determinants of Health     Financial Resource Strain: Not on file   Food Insecurity: Not on file   Transportation Needs: Not on file   Physical Activity: Not on file   Stress: Not on file   Social Connections: Not on file   Interpersonal Safety: Not At Risk (9/1/2023)    Humiliation, Afraid, Rape, and Kick questionnaire     Fear of Current or Ex-Partner: No     Emotionally Abused: No     Physically Abused: No     Sexually Abused: No   Housing Stability: Not on file            Medications:     Current Outpatient Medications   Medication    azaTHIOprine (IMURAN) 5 mg/mL SUSP    B Complex-C-Folic Acid (DIALYVITE) TABS    Calcium Carbonate-Vitamin D 600-10 MG-MCG TABS    dapsone 2 mg/mL SUSP    levofloxacin (LEVAQUIN) 250 MG tablet    loperamide (IMODIUM A-D) 2 MG tablet    metoprolol tartrate (LOPRESSOR) 50 MG tablet    multivitamin (CENTRUM SILVER) tablet    pantoprazole (PROTONIX) 2 mg/mL SUSP suspension    predniSONE (DELTASONE) 5 MG tablet    protein modular (PROSOURCE TF) LIQD    sevelamer carbonate, RENVELA, 0.8 GM PACK Packet    tacrolimus (GENERIC) 1 mg/mL suspension    vitamin B-12 (CYANOCOBALAMIN) 500 MCG tablet     No current facility-administered medications for this visit.            Physical Exam:   /69 (BP Location: Right arm, Patient Position: Sitting)   Pulse 88   Ht 1.6 m (5' 3\")   Wt 46.7 kg (102 lb 14.4 oz)   SpO2 95%   BMI 18.23 kg/m      GENERAL: alert, NAD  HEENT: NCAT, EOMI, no scleral icterus, oral mucosa moist and " without lesions  Neck: no cervical or supraclavicular adenopathy  Lungs: moderate air flow, scattered basilar crackles  CV: RRR, S1S2, + murmur  Abdomen: normoactive BS, soft, non tender  Lymph: no edema  Neuro: AAO X 3, CN 2-12 grossly intact  Psychiatric: normal affect, good eye contact  Skin: no rash, jaundice or lesions on limited exam         Data:   All laboratory and imaging data reviewed.      Recent Results (from the past 168 hour(s))   Tacrolimus level    Collection Time: 02/01/24 11:09 AM   Result Value Ref Range    Tacrolimus by Tandem Mass Spectrometry 23.0 (HH) 5.0 - 15.0 ug/L    Tacrolimus Last Dose Date 1/31/2024     Tacrolimus Last Dose Time 10:00 AM    Comprehensive metabolic panel    Collection Time: 02/01/24 11:13 AM   Result Value Ref Range    Sodium (External) 137 136 - 146 mmol/L    Potassium (External) 3.5 3.5 - 5.1 mmol/L    Chloride (External) 105 98 - 107 mmol/L    CO2 (External) 19 (L) 22 - 29 mmol/L    Anion Gap (External) 16.5 10.0 - 20.0 mmol/L    Creatinine (External) 3.08 (H) 0.57 - 1.11 mg/dL    Urea Nitrogen (External) 16.7 10.0 - 20.0 mg/dL    BUN/Creatinine Ratio (External) 5.42 (L) 11.70 - 22.90 ratio    Calcium (External) 8.4 (L) 8.6 - 10.5 mg/dL    Glucose (External) 212 (H) 70 - 100 mg/dL    GFR Estimated (External) 17 (L) >=60 ml/min/1.73m2    Protein Total (External) 6.2 6.0 - 8.0 g/dL    Albumin (External) 3.3 (L) 3.4 - 4.8 g/dL    AST (External) 19 5 - 41 U/L    ALT (External) 9 8 - 45 U/L    Alk Phosphatase (External) 71 50 - 136 U/L    Bilirubin Total (External) 0.4 0.2 - 1.2 mg/dL   General PFT Lab (Please always keep checked)    Collection Time: 02/07/24 10:44 AM   Result Value Ref Range    FVC-Pred 2.77 L    FVC-Pre 1.19 L    FVC-%Pred-Pre 42 %    FEV1-Pre 1.13 L    FEV1-%Pred-Pre 51 %    FEV1FVC-Pred 80 %    FEV1FVC-Pre 96 %    FEFMax-Pred 6.05 L/sec    FEFMax-Pre 4.08 L/sec    FEFMax-%Pred-Pre 67 %    FEF2575-Pred 2.04 L/sec    FEF2575-Pre 2.50 L/sec     JWL4946-%Pred-Pre 122 %    ExpTime-Pre 3.01 sec    FIFMax-Pre 3.27 L/sec    FEV1FEV6-Pred 80 %    FEV1FEV6-Pre 96 %     PFT interpretation:  Maneuver: valid, but ATS not met      Transplant Coordinator Note    Reason for visit: Post lung transplant follow up visit   Coordinator: Present   Caregiver:  None     Health concerns addressed today:  1. Tacrolimus goal range decreased to 7-9.   2. Left arm swollen. Pt reports this gets better and worse. Pt has her dialysis fistula in the left side and dialysis has looked at this.   3. Diarrhea up to 2-3 times daily.   4. Pt has only been able to do 1 carton of tube feeds daily as she does not feel well.   5. Chest/ab/Pelvis CT with Contrast today.   6. Weight loss- will put in for admission to initiate TPN.   7. May hold Dapsone pending lab results.   8.     Activity/rehab: pt was supposed to start pulm rehab, but was not feeling well.   Oxygen needs: none   Pain management/RX: tylenol as needed.   Diabetic management: NA   PJP prophylactic:     COVID:  COVID-19 infection (yes/no, date of most recent positive test):   Status/instructions given about COVID-19 vaccine:     Pt Education: medications (use/dose/side effects), how/when to call coordinator, frequency of labs, s/s of infection/rejection, call prior to starting any new medications, lab/vital sign book    Health Maintenance:   Last colonoscopy:   Next colonoscopy due:   Dermatology:  Vaccinations this visit:     Labs, CXR, PFTs reviewed with patient  Medication record reviewed and reconciled  Questions and concerns addressed    Patient Instructions  1. Continue to hydrate with 60-70 oz fluids daily.  2. Continue to exercise daily or most days of the week.  3. Follow up with your primary care provider for annual gender health maintenance procedures.  4. Follow up with colonoscopy schedule.  5. Follow up with annual dermatology visits.  6. It doesn't seem like the COVID vaccine is working well in lung transplant  patients. A number of lung transplant patients have gotten sick with COVID even after receiving the vaccines. Based on our recent experience, it can be life-threatening to get COVID  even after being vaccinated. Please continue to act like you did not get the COVID vaccine - social distancing, wearing a mask, good hand hygiene, etc. If the people around you are vaccinated, it will help reduce the risk of you getting COVID. All members of your household should be vaccinated.  7. Chest Ab Pelvis CT today.   8. We want you to be admitted for initiation of TNP to help with your nutrition. We will set up an admission. It may take a few days to get you a bed in the hospital. The hospital will call you when they have a bed ready.   9. Continue to get as much of the tube feeds as you can tolerate.       Next transplant clinic appointment:  TBD after hospital admission.   Next lab draw: today.     AVS printed at time of check out        Kaylin Triana PA-C

## 2024-02-07 NOTE — NURSING NOTE
Chief Complaint   Patient presents with    Lung Transplant     4 week follow up      Vitals were taken and medications were reconciled.   Jeanne Ordonez RMA  11:57 AM

## 2024-02-07 NOTE — PROGRESS NOTES
[facility]  February 7, 2024         Assessment and Plan:   Sofie Rodriguez is a 63 year old female with h/o bilateral lung transplant for COPD on 6/28/22 with course complicated by post-operative hemidiaphragm palsy, recurrent PNAs, positive DSA, EBV viremia, hypogammaglobulinemia, severe gastroparesis s/p G/J tube placement 7/27/22 with pyloric botox 1/25/23, GI bleed 2/2 pyloric ulcer, hemobilia s/p ERCP and MRCP, chronic diarrhea, recurrent C diff colitis, and ESRD on HD. Admitted May 2023 for FTT, supposed to be readmitted in July for FTT, but refused. Admitted to OSH 12/4-12/31 in December for right hip fracture s/p ORIF, now with significant deconditioning and ongoing severe malnutrition with gastroparesis, small bowel hypermotility and failure to tolerate any tube feeds. After extensive discussion with the dietician, GI and ultimately convincing the patient, she will be admitted or severe protein calorie malnutrition, FTT and initiation of TPN/lipids.     1. Severe protein calorie malnutrition:  FTT:   Gastroparesis s/p PEG/J s/p botox and G-POEM:   SB Hypomotility  Pyloric ulcer:  Chronic nausea and osmotic diarrhea:  SIBO s/p rifaximin:   Recurrent C diff colitis: chronic diarrhea since transplant with recurrent episodes of C diff. GI previously consulted, felt stools consistent with osmotic diarrhea. Over the last year has lost 40 lbs, unable to tolerate any combination of TF, most recently elemental formula. Normal fecal elastase last May. Following with Dr. Navarro who feels her main two issues are vagal injury induced gastroparesis and probably small bowel hypomotility. Her intolerance to J-tube feeds suggests the latter to be a significant issue (notes in a message that there are no motility experts at the  and he is limited in what can be offered). He is in agreement that TPN is likely her only options. Overall, her care has also been  compromised by difficulty contacting patient (dead phone, no My Chart follow up etc)  - Continue imodium BID, pantoprazole  - CT chest/abdomen/pelvis with/without contrast today to assess for other pathology (last in March 2023)  - At this time, her stools are stable to maybe slightly improved (could be improved from lack of oral or TF intake), do not feel stool studies necessary  - Please consult dietician and GI upon admission--confirmed with dialysis as OP that TPN is compatible, will need like, likely port (tentatively plan to cycle custom TPN overnight X 12 hours, which would not interfere with dialysis)    2. S/p bilateral lung transplant:   Right hemidiaphragm palsy:   Suspected CARLEE: was doing minimal walking prior to her fall in December, denies new pulmonary complaints today, was supposed to start PT for her hip, but felt sick on Monday. Severely deconditioned, complicated by above. Sating 95% on room air. CMV 1/19 negative. ImmuKnow 108 and cfDNA 0.12 on 1/10. CT chest completed after the visit after the last visit demonstrated stable left upper posterior opacity, though to be atelectasis based on prior conversations with few patchy areas of LLL. CXR reviewed today and demonstrates slight increase in left effusion with mild edema (patient notes she is starting on a diuretic as well as HD). PFTs unchanged from last check (but ATS not med), remain significantly below her baseline (1.4-1.5L). Suspect decline in pulmonary function related to severe deconditioning, inability to tolerate activity, complicated by the above.   - Schedule follow up with sleep to discuss possible CPAP, given recommendations from August sleep study  - Continue IS including tacrolimus (decrease goal to 7-9) and prednisone  - Dapsone qMWF for PJP ppx     3. Positive DSA: no prior treatment for AMR, has been watched for quite some time given no pulmonary symptoms, prior PFTs stability and significant and ongoing failure to thrive. Last  "DSA improved to 5723, however will ongoing lower PFTs, may need to consider AMR treatment, however, unclear how she would tolerate.      4. EBV viremia: last level of 76K (log 4.9) on 1/10  - EBV pending for today     5. CKD:   HTN:   LUE edema: BP overall controlled. Doing dialysis T/Th/Sat. Notes she has had intermittent and significant swelling in her left arm where fistula is, thinks she may need a fistulogram.  Transplant pre evaluation is currently on hold until we can improve her GI issues/nutrition/weight gain and and now her pulmonary function.    Excluding time spent reading PFTs, I have spent > 60 minutes on the day of encounter reviewing the patient's chart including OSH notes, vitals, labs, imaging and medications and formulating plan of care with the patient and coordinator.     Signed out to triage for bed watlist.     Kaylin Triana PA-C  Pulmonary, Allergy, Critical Care and Sleep Medicine        Interval History:     Has not started PT for her hip, was supposed to start this past Monday, but got sick. Breathing is okay, but notes she is very tired, worn out and cold. No fever or chills, no sinus or nasal congestion, no allergies symptoms. No cough, tightness or congestion.     Sofie notes \"miraculously\" her diarrhea is 50% better, started the beginning of the year. She is unsure what improved her stools. Some days she won't have any stools, occasionally 1 times/day and when she has diarrhea 2-3 times/day and \"mush to liquid.\" Was taking imodium 3 yolanda/day, taking it 2 times/day. Since talking to Kera on 1/18, patient has only down 5 cartons total. Is she isn't feeling good with nausea and stomach cramps, she doesn't hook it up her tube feeds.           Review of Systems:   Please see HPI, otherwise the complete 10 point ROS is negative.           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    Hepatitis 2017    Hep C, [facility]    History of blood transfusion     HTN (hypertension)     Infectious mononucleosis     Lung infection 11/30/2022    Osteopenia      Past Surgical History:   Procedure Laterality Date    BRONCHOSCOPY (RIGID OR FLEXIBLE), DIAGNOSTIC N/A 08/02/2022    Procedure: BRONCHOSCOPY, DIAGNOSTIC- inspection Bronch;  Surgeon: Kamala Lovell MD;  Location: [facility]    BRONCHOSCOPY (RIGID OR FLEXIBLE), DIAGNOSTIC N/A 09/13/2022    Procedure: INSPECTION BRONCHOSCOPY, WITH BRONCHOALVEOLAR LAVAGE;  Surgeon: Jose R Mccullough MD;  Location: [facility]    BRONCHOSCOPY (RIGID OR FLEXIBLE), DIAGNOSTIC N/A 11/09/2022    Procedure: BRONCHOSCOPY, WITH BRONCHOALVEOLAR LAVAGE AND BIOPSY;  Surgeon: Cesar Lima MD;  Location: [facility]    BRONCHOSCOPY (RIGID OR FLEXIBLE), DIAGNOSTIC N/A 01/25/2023    Procedure: BRONCHOSCOPY, WITH BRONCHOALVEOLAR LAVAGE AND BIOPSY;  Surgeon: Mason Reddy MD;  Location: [facility]    BRONCHOSCOPY (RIGID OR FLEXIBLE), DIAGNOSTIC N/A 04/19/2023    Procedure: BRONCHOSCOPY, WITH BRONCHOALVEOLAR LAVAGE AND BIOPSY;  Surgeon: Kamala Lovell MD;  Location: [facility]    BRONCHOSCOPY (RIGID OR FLEXIBLE), DIAGNOSTIC N/A 07/12/2023    Procedure: BRONCHOSCOPY, WITH BRONCHOALVEOLAR LAVAGE AND BIOPSY;  Surgeon: Cesar Lima MD;  Location: [facility]    BRONCHOSCOPY FLEXIBLE AND RIGID N/A 07/19/2022    Procedure: BRONCHOSCOPY inspection only;  Surgeon: Bob Liao MD;  Location: [facility]    COLONOSCOPY  2015    CORONARY ANGIOGRAPHY ADULT ORDER      CV CORONARY ANGIOGRAM N/A 06/30/2021    Procedure: CV CORONARY ANGIOGRAM;  Surgeon: Alexander Cuellar MD;  Location:  HEART CARDIAC CATH LAB    CV RIGHT HEART CATH MEASUREMENTS RECORDED N/A 06/30/2021    Procedure: CV RIGHT HEART CATH;  Surgeon: Alexander Cuellar MD;  Location: [facility] CARDIAC  CATH LAB    ENDOSCOPIC PERORAL MYOTOMY N/A 10/09/2023    Procedure: MYOTOMY, ESOPHAGUS, ENDOSCOPIC, ORAL APPROACH;  Surgeon: Gonzalez Navarro MD;  [facility]    ENDOSCOPIC RETROGRADE CHOLANGIOPANCREATOGRAM N/A 08/11/2022    Procedure: ENDOSCOPIC RETROGRADE CHOLANGIOPANCREATOGRAPHY WITH PANCREATIC DUCT NEEDLE KNIFE AND STENT PLACEMENT, BILE DUCT SPHINCTEROTOMY, BLOOD/DEBRIS REMOVAL AND STENT PLACEMENT;  Surgeon: Cosmo Arroyo MD;  [facility]: [facility]    ENDOSCOPIC RETROGRADE CHOLANGIOPANCREATOGRAM N/A 10/07/2022    Procedure: ENDOSCOPIC RETROGRADE CHOLANGIOPANCREATOGRAPHY with biliary and pancreatic stent removal, debris removal;  Surgeon: Cosmo Arroyo MD;  Location: [facility]    ENT SURGERY  1974    tonsillectomy    ENTEROSCOPY SMALL BOWEL N/A 08/11/2022    Procedure: SMALL BOWEL ENTEROSCOPY;  Surgeon: Cosmo Arroyo MD;  [facility]: [facility]    ESOPHAGOGASTRODUODENOSCOPY, WITH NASOGASTRIC TUBE INSERTION N/A 07/01/2022    Procedure: ESOPHAGOGASTRODUODENOSCOPY, WITH NASOJEJUNAL TUBE INSERTION;  Surgeon: Ozzy Nickerson MD;  Location:  GI    ESOPHAGOSCOPY, GASTROSCOPY, DUODENOSCOPY (EGD), COMBINED N/A 08/03/2022    Procedure: ESOPHAGOGASTRODUODENOSCOPY (EGD);  Surgeon: Ira Andres MD;  Location:  GI    ESOPHAGOSCOPY, GASTROSCOPY, DUODENOSCOPY (EGD), COMBINED N/A 01/25/2023    Procedure: ESOPHAGOGASTRODUODENOSCOPY (EGD) with botox injection;  Surgeon: Gonzalez Navarro MD;  Location:  GI    ESOPHAGOSCOPY, GASTROSCOPY, DUODENOSCOPY (EGD), COMBINED N/A 10/09/2023    Procedure: ESOPHAGOGASTRODUODENOSCOPY;  Surgeon: Gonzalez Navarro MD;  [facility]: [facility]    HAND SURGERY      INSERT CHEST TUBE Right 09/13/2022    Procedure: Insert chest tube;  Surgeon: Jose R Mccullough MD;  Location:  GI    IR CVC TUNNEL PLACEMENT > 5 YRS OF AGE  09/26/2022    IR GASTRO JEJUNOSTOMY TUBE CHANGE  08/31/2022    IR GASTRO JEJUNOSTOMY TUBE CHANGE  12/21/2022    IR GASTRO JEJUNOSTOMY TUBE CHANGE   07/12/2023    IR GASTRO JEJUNOSTOMY TUBE CHANGE  08/18/2023    IR GASTRO JEJUNOSTOMY TUBE CHANGE  11/14/2023    IR GASTRO JEJUNOSTOMY TUBE PLACEMENT  07/27/2022    IR THORACENTESIS  08/29/2022    LEEP TX, CERVICAL  04/07/2017    HECTOR III    LYMPH NODE BIOPSY Left 2005    Left axilla, benign- Vader    MIDLINE INSERTION - DOUBLE LUMEN Left 07/28/2022    20cm, Basilic vein    REPLACE GASTROJEJUNOSTOMY TUBE, PERCUTANEOUS  10/07/2022    Procedure: Replace Gastrojejunostomy Tube;  Surgeon: Cosmo Arroyo MD;  [facility]    THORACENTESIS Left 08/29/2022    Procedure: THORACENTESIS;  Surgeon: Bo Capone PA-C;  Location: [facility] OR    THORACENTESIS Left 09/13/2022    Procedure: Thoracentesis;  Surgeon: Jose R Mccullough MD;  Location: [facility]    THROMBECTOMY UPPER EXTREMITY Left 07/02/2022    Procedure: LEFT RADIAL ARM THROMBECTOMY;  Surgeon: Christie Graham MD;  [facility]    TRANSPLANT LUNG RECIPIENT SINGLE X2 Bilateral 06/28/2022    Procedure: Clamshell Incision, Bilateral Sequential Lung Transplant, On Cardiopulmonary Bypass, Flexible Bronchoscopy;  Surgeon: Sue Sunshine MD;  Location: [facility]           Family History:     No family history on file.         Social History:     Social History     Socioeconomic History    Marital status:      Spouse name: Not on file    Number of children: Not on file    Years of education: Not on file    Highest education level: Not on file   Occupational History    Not on file   Tobacco Use    Smoking status: Former     Packs/day: 0.50     Years: 30.00     Additional pack years: 0.00     Total pack years: 15.00     Types: Cigarettes     Quit date: 11/11/2020     Years since quitting: 3.2    Smokeless tobacco: Never   Substance and Sexual Activity    Alcohol use: Not Currently     Comment: Stopped drinking in 2020    Drug use: Not Currently     Types: Marijuana, Methamphetamines     Comment: hx:marijuana and methamphetamine-quit both unsure ?  " 2-3 yrs ago    Sexual activity: Not on file   Other Topics Concern    Parent/sibling w/ CABG, MI or angioplasty before 65F 55M? Not Asked   Social History Narrative    Not on file     Social Determinants of Health     Financial Resource Strain: Not on file   Food Insecurity: Not on file   Transportation Needs: Not on file   Physical Activity: Not on file   Stress: Not on file   Social Connections: Not on file   Interpersonal Safety: Not At Risk (9/1/2023)    Humiliation, Afraid, Rape, and Kick questionnaire     Fear of Current or Ex-Partner: No     Emotionally Abused: No     Physically Abused: No     Sexually Abused: No   Housing Stability: Not on file            Medications:     Current Outpatient Medications   Medication    azaTHIOprine (IMURAN) 5 mg/mL SUSP    B Complex-C-Folic Acid (DIALYVITE) TABS    Calcium Carbonate-Vitamin D 600-10 MG-MCG TABS    dapsone 2 mg/mL SUSP    levofloxacin (LEVAQUIN) 250 MG tablet    loperamide (IMODIUM A-D) 2 MG tablet    metoprolol tartrate (LOPRESSOR) 50 MG tablet    multivitamin (CENTRUM SILVER) tablet    pantoprazole (PROTONIX) 2 mg/mL SUSP suspension    predniSONE (DELTASONE) 5 MG tablet    protein modular (PROSOURCE TF) LIQD    sevelamer carbonate, RENVELA, 0.8 GM PACK Packet    tacrolimus (GENERIC) 1 mg/mL suspension    vitamin B-12 (CYANOCOBALAMIN) 500 MCG tablet     No current facility-administered medications for this visit.            Physical Exam:   /69 (BP Location: Right arm, Patient Position: Sitting)   Pulse 88   Ht 1.6 m (5' 3\")   Wt 46.7 kg (102 lb 14.4 oz)   SpO2 95%   BMI 18.23 kg/m      GENERAL: alert, NAD  HEENT: NCAT, EOMI, no scleral icterus, oral mucosa moist and without lesions  Neck: no cervical or supraclavicular adenopathy  Lungs: moderate air flow, scattered basilar crackles  CV: RRR, S1S2, + murmur  Abdomen: normoactive BS, soft, non tender  Lymph: no edema  Neuro: AAO X 3, CN 2-12 grossly intact  Psychiatric: normal affect, good eye " contact  Skin: no rash, jaundice or lesions on limited exam         Data:   All laboratory and imaging data reviewed.      Recent Results (from the past 168 hour(s))   Tacrolimus level    Collection Time: 02/01/24 11:09 AM   Result Value Ref Range    Tacrolimus by Tandem Mass Spectrometry 23.0 (HH) 5.0 - 15.0 ug/L    Tacrolimus Last Dose Date 1/31/2024     Tacrolimus Last Dose Time 10:00 AM    Comprehensive metabolic panel    Collection Time: 02/01/24 11:13 AM   Result Value Ref Range    Sodium (External) 137 136 - 146 mmol/L    Potassium (External) 3.5 3.5 - 5.1 mmol/L    Chloride (External) 105 98 - 107 mmol/L    CO2 (External) 19 (L) 22 - 29 mmol/L    Anion Gap (External) 16.5 10.0 - 20.0 mmol/L    Creatinine (External) 3.08 (H) 0.57 - 1.11 mg/dL    Urea Nitrogen (External) 16.7 10.0 - 20.0 mg/dL    BUN/Creatinine Ratio (External) 5.42 (L) 11.70 - 22.90 ratio    Calcium (External) 8.4 (L) 8.6 - 10.5 mg/dL    Glucose (External) 212 (H) 70 - 100 mg/dL    GFR Estimated (External) 17 (L) >=60 ml/min/1.73m2    Protein Total (External) 6.2 6.0 - 8.0 g/dL    Albumin (External) 3.3 (L) 3.4 - 4.8 g/dL    AST (External) 19 5 - 41 U/L    ALT (External) 9 8 - 45 U/L    Alk Phosphatase (External) 71 50 - 136 U/L    Bilirubin Total (External) 0.4 0.2 - 1.2 mg/dL   General PFT Lab (Please always keep checked)    Collection Time: 02/07/24 10:44 AM   Result Value Ref Range    FVC-Pred 2.77 L    FVC-Pre 1.19 L    FVC-%Pred-Pre 42 %    FEV1-Pre 1.13 L    FEV1-%Pred-Pre 51 %    FEV1FVC-Pred 80 %    FEV1FVC-Pre 96 %    FEFMax-Pred 6.05 L/sec    FEFMax-Pre 4.08 L/sec    FEFMax-%Pred-Pre 67 %    FEF2575-Pred 2.04 L/sec    FEF2575-Pre 2.50 L/sec    JJR2965-%Pred-Pre 122 %    ExpTime-Pre 3.01 sec    FIFMax-Pre 3.27 L/sec    FEV1FEV6-Pred 80 %    FEV1FEV6-Pre 96 %     PFT interpretation:  Maneuver: valid, but ATS not met

## 2024-02-07 NOTE — DISCHARGE INSTRUCTIONS

## 2024-02-07 NOTE — PROGRESS NOTES
Sofie Rodriguez comes into clinic today at the request of FLOYD Triana Ordering Provider for spirometry     Vik Leslie , RRT

## 2024-02-07 NOTE — PATIENT INSTRUCTIONS
Patient Instructions  1. Continue to hydrate with 60-70 oz fluids daily.  2. Continue to exercise daily or most days of the week.  3. Follow up with your primary care provider for annual gender health maintenance procedures.  4. Follow up with colonoscopy schedule.  5. Follow up with annual dermatology visits.  6. It doesn't seem like the COVID vaccine is working well in lung transplant patients. A number of lung transplant patients have gotten sick with COVID even after receiving the vaccines. Based on our recent experience, it can be life-threatening to get COVID  even after being vaccinated. Please continue to act like you did not get the COVID vaccine - social distancing, wearing a mask, good hand hygiene, etc. If the people around you are vaccinated, it will help reduce the risk of you getting COVID. All members of your household should be vaccinated.  7. Chest Ab Pelvis CT today.   8. We want you to be admitted for initiation of TNP to help with your nutrition. We will set up an admission. It may take a few days to get you a bed in the hospital. The hospital will call you when they have a bed ready.   9. Continue to get as much of the tube feeds as you can tolerate.       Next transplant clinic appointment:  TBD after hospital admission.   Next lab draw: today.     AVS printed at time of check out          ~~~~~~~~~~~~~~~~~~~~~~~~~    Thoracic Transplant Office phone [phone] (alt [phone]), fax [phone]    Office Hours 8:30 - 5:00     For after-hours urgent issues, please dial [phone] (alt [phone]) and ask the  to page the Thoracic Transplant Coordinator On-Call.   --------------------  To expedite your medication refill(s), please contact your pharmacy and have them fax a refill request to: [phone]    *Please allow 3 business days for routine medication refills.  *Please allow 5 business days for controlled substance medication refills.    **For Diabetic medications and  supplies refill(s), please contact your pharmacy and have them contact your Endocrine team.  --------------------  For scheduling appointments call [phone] (alt [phone])  --------------------  Please Note: If you are active on [employer], all future test results will be sent by [employer] message only, and will no longer be called to patient. You may also receive communication directly from your physician.

## 2024-02-08 ENCOUNTER — TELEPHONE (OUTPATIENT)
Dept: INTERVENTIONAL RADIOLOGY/VASCULAR | Facility: CLINIC | Age: 64
End: 2024-02-08
Payer: MEDICARE

## 2024-02-08 LAB
EBV DNA COPIES/ML, INSTRUMENT: ABNORMAL COPIES/ML
EBV DNA SPEC NAA+PROBE-LOG#: 5 {LOG_COPIES}/ML

## 2024-02-08 NOTE — LETTER
PHYSICIAN ORDERS      DATE & TIME ISSUED: 2024 12:47 PM  PATIENT NAME: Sofie Rodriguez   : 1960     [facility] MR# [if applicable]: [id number]     DIAGNOSIS:  Lung Transplant  Z94.2  Please have nephrologist follow up on #4 finding below from CT chest/abdomen/pelvis done on patient 26    IMPRESSION:   1. Nonspecific central intrahepatic and extrahepatic pneumobilia. This  appears new compared to 1/10/2024.  2. Percutaneous gastrojejunostomy tube in appropriate position. No  evidence of obstruction.   3. Decreased left upper lung opacities suggestive of improving  infection. New subtle tree-in-bud nodularity in the right lower lobe  which may represent infectious versus inflammatory process.  4. Findings of volume overload with mediastinal edema, generalized  body wall edema, mesenteric edema and small volume ascites.      Any questions please call: Girma [phone]    Please fax these results to [phone].        Kaylin Triana PA-C

## 2024-02-08 NOTE — PROGRESS NOTES
Order for tacrolimus level faxed to local lab   Spoke to local HD center. Faxed results of CT to center for nephrologist to follow up on.

## 2024-02-08 NOTE — LETTER
PHYSICIAN ORDERS      DATE & TIME ISSUED: 2024 12:39 PM  PATIENT NAME: Sofie Rodriguez   : 1960     [facility] MR# [if applicable]: [id number]     DIAGNOSIS:  Lung Transplant  Z94.2  Please draw tacrolimus level 24      Any questions please call: Girma [phone]    Please fax these results to [phone].        Kaylin Triana PA-C

## 2024-02-09 ENCOUNTER — TELEPHONE (OUTPATIENT)
Dept: TRANSPLANT | Facility: CLINIC | Age: 64
End: 2024-02-09
Payer: MEDICARE

## 2024-02-09 NOTE — TELEPHONE ENCOUNTER
Called [facility] to double check they did not receive fax for tacrolimus level today.     Will re-fax order   Confirmed fax sent via [employer]

## 2024-02-10 ENCOUNTER — HOSPITAL ENCOUNTER (INPATIENT)
Facility: CLINIC | Age: 64
LOS: 65 days | Discharge: HOME | DRG: 640 | End: 2024-04-15
Attending: INTERNAL MEDICINE | Admitting: INTERNAL MEDICINE
Payer: MEDICARE

## 2024-02-10 DIAGNOSIS — N17.0 ACUTE KIDNEY FAILURE WITH TUBULAR NECROSIS (H): ICD-10-CM

## 2024-02-10 DIAGNOSIS — N18.32 ANEMIA OF CHRONIC RENAL FAILURE, STAGE 3B (H): ICD-10-CM

## 2024-02-10 DIAGNOSIS — R04.2 HEMOPTYSIS: ICD-10-CM

## 2024-02-10 DIAGNOSIS — R93.89 ABNORMAL FINDINGS ON DIAGNOSTIC IMAGING OF CARDIOVASCULAR SYSTEM: ICD-10-CM

## 2024-02-10 DIAGNOSIS — E09.65 DRUG OR CHEMICAL INDUCED DIABETES MELLITUS WITH HYPERGLYCEMIA, UNSPECIFIED WHETHER LONG TERM INSULIN USE (H): ICD-10-CM

## 2024-02-10 DIAGNOSIS — D80.1 HYPOGAMMAGLOBULINEMIA (H): ICD-10-CM

## 2024-02-10 DIAGNOSIS — J18.9 LUNG INFECTION: ICD-10-CM

## 2024-02-10 DIAGNOSIS — Z93.4 GASTROJEJUNOSTOMY TUBE STATUS (H): ICD-10-CM

## 2024-02-10 DIAGNOSIS — K25.3 ACUTE PYLORUS ULCER: ICD-10-CM

## 2024-02-10 DIAGNOSIS — F41.9 ANXIETY: ICD-10-CM

## 2024-02-10 DIAGNOSIS — M79.18 MUSCULOSKELETAL PAIN: ICD-10-CM

## 2024-02-10 DIAGNOSIS — E03.9 HYPOTHYROIDISM, UNSPECIFIED TYPE: ICD-10-CM

## 2024-02-10 DIAGNOSIS — U07.1 CLINICAL DIAGNOSIS OF COVID-19: ICD-10-CM

## 2024-02-10 DIAGNOSIS — K31.84 GASTROPARESIS: ICD-10-CM

## 2024-02-10 DIAGNOSIS — E46 PROTEIN-CALORIE MALNUTRITION, UNSPECIFIED SEVERITY (H): Primary | ICD-10-CM

## 2024-02-10 DIAGNOSIS — B18.2 CHRONIC HEPATITIS C WITHOUT HEPATIC COMA (H): ICD-10-CM

## 2024-02-10 DIAGNOSIS — D63.8 ANEMIA IN OTHER CHRONIC DISEASES CLASSIFIED ELSEWHERE: ICD-10-CM

## 2024-02-10 DIAGNOSIS — J96.02 ACUTE RESPIRATORY FAILURE WITH HYPOXIA AND HYPERCARBIA (H): ICD-10-CM

## 2024-02-10 DIAGNOSIS — Z99.2 ESRD (END STAGE RENAL DISEASE) ON DIALYSIS (H): ICD-10-CM

## 2024-02-10 DIAGNOSIS — Z94.2 LUNG TRANSPLANT STATUS, BILATERAL (H): ICD-10-CM

## 2024-02-10 DIAGNOSIS — J96.01 ACUTE RESPIRATORY FAILURE WITH HYPOXIA AND HYPERCARBIA (H): ICD-10-CM

## 2024-02-10 DIAGNOSIS — G89.18 ACUTE POST-OPERATIVE PAIN: ICD-10-CM

## 2024-02-10 DIAGNOSIS — I74.2 THROMBUS OF LEFT RADIAL ARTERY (H): ICD-10-CM

## 2024-02-10 DIAGNOSIS — A04.72 C. DIFFICILE COLITIS: ICD-10-CM

## 2024-02-10 DIAGNOSIS — D63.1 ANEMIA OF CHRONIC RENAL FAILURE, STAGE 3B (H): ICD-10-CM

## 2024-02-10 DIAGNOSIS — D84.9 IMMUNOSUPPRESSED STATUS (H): Chronic | ICD-10-CM

## 2024-02-10 DIAGNOSIS — I48.0 PAROXYSMAL A-FIB (H): ICD-10-CM

## 2024-02-10 DIAGNOSIS — J98.6 DIAPHRAGM DYSFUNCTION: ICD-10-CM

## 2024-02-10 DIAGNOSIS — Z98.890 STATUS POST CORONARY ANGIOGRAM: ICD-10-CM

## 2024-02-10 DIAGNOSIS — R19.7 DIARRHEA OF PRESUMED INFECTIOUS ORIGIN: ICD-10-CM

## 2024-02-10 DIAGNOSIS — J81.0 ACUTE PULMONARY EDEMA (H): ICD-10-CM

## 2024-02-10 DIAGNOSIS — N18.32 STAGE 3B CHRONIC KIDNEY DISEASE (H): ICD-10-CM

## 2024-02-10 DIAGNOSIS — B27.00 EBV (EPSTEIN-BARR VIRUS) VIREMIA: ICD-10-CM

## 2024-02-10 DIAGNOSIS — J44.9 CHRONIC OBSTRUCTIVE PULMONARY DISEASE, UNSPECIFIED COPD TYPE (H): ICD-10-CM

## 2024-02-10 DIAGNOSIS — T86.91 TRANSPLANT REJECTION: ICD-10-CM

## 2024-02-10 DIAGNOSIS — K59.00 CONSTIPATION, UNSPECIFIED CONSTIPATION TYPE: ICD-10-CM

## 2024-02-10 DIAGNOSIS — Z94.2 S/P LUNG TRANSPLANT (H): Chronic | ICD-10-CM

## 2024-02-10 DIAGNOSIS — D72.819 LEUKOPENIA, UNSPECIFIED TYPE: ICD-10-CM

## 2024-02-10 DIAGNOSIS — Z79.2 ADMINISTRATION OF LONG-TERM PROPHYLACTIC ANTIBIOTICS: Chronic | ICD-10-CM

## 2024-02-10 DIAGNOSIS — R62.7 FAILURE TO THRIVE IN ADULT: ICD-10-CM

## 2024-02-10 DIAGNOSIS — N18.6 ESRD (END STAGE RENAL DISEASE) ON DIALYSIS (H): ICD-10-CM

## 2024-02-10 DIAGNOSIS — I95.3 HEMODIALYSIS-ASSOCIATED HYPOTENSION: ICD-10-CM

## 2024-02-10 PROCEDURE — 250N000013 HC RX MED GY IP 250 OP 250 PS 637: Performed by: INTERNAL MEDICINE

## 2024-02-10 PROCEDURE — 120N000002 HC R&B MED SURG/OB UMMC

## 2024-02-10 PROCEDURE — 250N000013 HC RX MED GY IP 250 OP 250 PS 637

## 2024-02-10 PROCEDURE — 96372 THER/PROPH/DIAG INJ SC/IM: CPT | Performed by: INTERNAL MEDICINE

## 2024-02-10 PROCEDURE — 250N000011 HC RX IP 250 OP 636: Performed by: INTERNAL MEDICINE

## 2024-02-10 PROCEDURE — 99222 1ST HOSP IP/OBS MODERATE 55: CPT | Mod: A1 | Performed by: INTERNAL MEDICINE

## 2024-02-10 PROCEDURE — 250N000012 HC RX MED GY IP 250 OP 636 PS 637: Performed by: INTERNAL MEDICINE

## 2024-02-10 PROCEDURE — 250N000013 HC RX MED GY IP 250 OP 250 PS 637: Performed by: PHYSICIAN ASSISTANT

## 2024-02-10 RX ORDER — CALCIUM CARBONATE/VITAMIN D3 600 MG-10
1 TABLET ORAL
Status: DISCONTINUED | OUTPATIENT
Start: 2024-02-10 | End: 2024-04-15 | Stop reason: HOSPADM

## 2024-02-10 RX ORDER — ASCORBIC ACID, THIAMINE, RIBOFLAVIN, NIACINAMIDE, PYRIDOXINE, FOLIC ACID, COBALAMIN, BIOTIN, PANTOTHENIC ACID 100; 1.5; 1.7; 20; 10; 1; 6; 300; 1 MG/1; MG/1; MG/1; MG/1; MG/1; MG/1; UG/1; UG/1; MG/1
1 TABLET, COATED ORAL EVERY MORNING
Status: DISCONTINUED | OUTPATIENT
Start: 2024-02-11 | End: 2024-02-10

## 2024-02-10 RX ORDER — SEVELAMER CARBONATE FOR ORAL SUSPENSION 800 MG/1
0.8 POWDER, FOR SUSPENSION ORAL
Status: DISCONTINUED | OUTPATIENT
Start: 2024-02-10 | End: 2024-02-12

## 2024-02-10 RX ORDER — ACETAMINOPHEN 500 MG
500-1000 TABLET ORAL 3 TIMES DAILY PRN
COMMUNITY

## 2024-02-10 RX ORDER — MULTIPLE VITAMINS W/ MINERALS TAB 9MG-400MCG
1 TAB ORAL DAILY
Status: DISCONTINUED | OUTPATIENT
Start: 2024-02-10 | End: 2024-02-11

## 2024-02-10 RX ORDER — PREDNISONE 5 MG/1
5 TABLET ORAL EVERY MORNING
Status: DISCONTINUED | OUTPATIENT
Start: 2024-02-11 | End: 2024-02-12

## 2024-02-10 RX ORDER — LOPERAMIDE HYDROCHLORIDE 2 MG/1
2 TABLET ORAL 4 TIMES DAILY PRN
Status: DISCONTINUED | OUTPATIENT
Start: 2024-02-10 | End: 2024-02-19

## 2024-02-10 RX ORDER — PREDNISONE 2.5 MG/1
2.5 TABLET ORAL EVERY EVENING
Status: DISCONTINUED | OUTPATIENT
Start: 2024-02-10 | End: 2024-02-12

## 2024-02-10 RX ORDER — UREA 10 %
500 LOTION (ML) TOPICAL DAILY
Status: DISCONTINUED | OUTPATIENT
Start: 2024-02-10 | End: 2024-04-11

## 2024-02-10 RX ORDER — METOPROLOL TARTRATE 25 MG/1
25 TABLET, FILM COATED ORAL 2 TIMES DAILY
Status: DISCONTINUED | OUTPATIENT
Start: 2024-02-10 | End: 2024-02-11

## 2024-02-10 RX ORDER — FUROSEMIDE 40 MG
40 TABLET ORAL 2 TIMES DAILY
Status: ON HOLD | COMMUNITY
End: 2024-04-14

## 2024-02-10 RX ORDER — ACETAMINOPHEN 325 MG/1
650 TABLET ORAL EVERY 6 HOURS PRN
Status: DISCONTINUED | OUTPATIENT
Start: 2024-02-10 | End: 2024-02-11

## 2024-02-10 RX ORDER — HEPARIN SODIUM 5000 [USP'U]/.5ML
5000 INJECTION, SOLUTION INTRAVENOUS; SUBCUTANEOUS EVERY 12 HOURS
Status: DISCONTINUED | OUTPATIENT
Start: 2024-02-10 | End: 2024-02-11

## 2024-02-10 RX ADMIN — METOPROLOL TARTRATE 25 MG: 25 TABLET, FILM COATED ORAL at 21:15

## 2024-02-10 RX ADMIN — ACETAMINOPHEN 650 MG: 325 TABLET, FILM COATED ORAL at 22:18

## 2024-02-10 RX ADMIN — LOPERAMIDE HYDROCHLORIDE 2 MG: 2 TABLET ORAL at 22:18

## 2024-02-10 RX ADMIN — PREDNISONE 2.5 MG: 2.5 TABLET ORAL at 21:16

## 2024-02-10 RX ADMIN — CALCIUM CARBONATE 600 MG (1,500 MG)-VITAMIN D3 400 UNIT TABLET 1 TABLET: at 21:15

## 2024-02-10 RX ADMIN — TACROLIMUS 4 MG: 5 CAPSULE ORAL at 21:18

## 2024-02-10 RX ADMIN — HEPARIN SODIUM 5000 UNITS: 5000 INJECTION, SOLUTION INTRAVENOUS; SUBCUTANEOUS at 21:15

## 2024-02-10 RX ADMIN — Medication 40 MG: at 21:16

## 2024-02-10 RX ADMIN — Medication 1 TABLET: at 21:15

## 2024-02-10 RX ADMIN — SEVELAMER CARBONATE 0.8 G: 800 POWDER, FOR SUSPENSION ORAL at 21:17

## 2024-02-10 ASSESSMENT — COLUMBIA-SUICIDE SEVERITY RATING SCALE - C-SSRS
1. IN THE PAST MONTH, HAVE YOU WISHED YOU WERE DEAD OR WISHED YOU COULD GO TO SLEEP AND NOT WAKE UP?: NO
6. HAVE YOU EVER DONE ANYTHING, STARTED TO DO ANYTHING, OR PREPARED TO DO ANYTHING TO END YOUR LIFE?: NO
2. HAVE YOU ACTUALLY HAD ANY THOUGHTS OF KILLING YOURSELF IN THE PAST MONTH?: NO

## 2024-02-10 ASSESSMENT — ACTIVITIES OF DAILY LIVING (ADL)
ADLS_ACUITY_SCORE: 26

## 2024-02-11 ENCOUNTER — APPOINTMENT (OUTPATIENT)
Dept: PHYSICAL THERAPY | Facility: CLINIC | Age: 64
DRG: 640 | End: 2024-02-11
Attending: INTERNAL MEDICINE
Payer: MEDICARE

## 2024-02-11 PROBLEM — R62.7 FAILURE TO THRIVE IN ADULT: Status: ACTIVE | Noted: 2023-05-17

## 2024-02-11 LAB
MAGNESIUM SERPL-MCNC: 2.2 MG/DL (ref 1.7–2.3)
PHOSPHATE SERPL-MCNC: 2.9 MG/DL (ref 2.5–4.5)
POTASSIUM SERPL-SCNC: 3.8 MMOL/L (ref 3.4–5.3)
SARS-COV-2 RNA RESP QL NAA+PROBE: NEGATIVE

## 2024-02-11 PROCEDURE — 250N000012 HC RX MED GY IP 250 OP 636 PS 637: Performed by: INTERNAL MEDICINE

## 2024-02-11 PROCEDURE — 120N000002 HC R&B MED SURG/OB UMMC

## 2024-02-11 PROCEDURE — 84100 ASSAY OF PHOSPHORUS: CPT | Performed by: INTERNAL MEDICINE

## 2024-02-11 PROCEDURE — 99232 SBSQ HOSP IP/OBS MODERATE 35: CPT | Performed by: INTERNAL MEDICINE

## 2024-02-11 PROCEDURE — 250N000013 HC RX MED GY IP 250 OP 250 PS 637: Performed by: INTERNAL MEDICINE

## 2024-02-11 PROCEDURE — 250N000011 HC RX IP 250 OP 636: Performed by: INTERNAL MEDICINE

## 2024-02-11 PROCEDURE — 250N000009 HC RX 250: Performed by: INTERNAL MEDICINE

## 2024-02-11 PROCEDURE — 87635 SARS-COV-2 COVID-19 AMP PRB: CPT | Performed by: INTERNAL MEDICINE

## 2024-02-11 PROCEDURE — 84132 ASSAY OF SERUM POTASSIUM: CPT | Performed by: INTERNAL MEDICINE

## 2024-02-11 PROCEDURE — 97161 PT EVAL LOW COMPLEX 20 MIN: CPT | Mod: GP | Performed by: PHYSICAL THERAPIST

## 2024-02-11 PROCEDURE — 99222 1ST HOSP IP/OBS MODERATE 55: CPT | Performed by: INTERNAL MEDICINE

## 2024-02-11 PROCEDURE — 83735 ASSAY OF MAGNESIUM: CPT | Performed by: INTERNAL MEDICINE

## 2024-02-11 PROCEDURE — 36415 COLL VENOUS BLD VENIPUNCTURE: CPT | Performed by: INTERNAL MEDICINE

## 2024-02-11 RX ORDER — ACETAMINOPHEN 325 MG/1
650 TABLET ORAL EVERY 6 HOURS PRN
Status: DISCONTINUED | OUTPATIENT
Start: 2024-02-11 | End: 2024-02-11

## 2024-02-11 RX ORDER — CALCIUM CARBONATE 1250 MG/5ML
1250 SUSPENSION ORAL 4 TIMES DAILY PRN
Status: DISCONTINUED | OUTPATIENT
Start: 2024-02-11 | End: 2024-04-15 | Stop reason: HOSPADM

## 2024-02-11 RX ORDER — LIDOCAINE 40 MG/G
CREAM TOPICAL
Status: DISCONTINUED | OUTPATIENT
Start: 2024-02-11 | End: 2024-02-20

## 2024-02-11 RX ORDER — HEPARIN SODIUM 5000 [USP'U]/.5ML
5000 INJECTION, SOLUTION INTRAVENOUS; SUBCUTANEOUS EVERY 8 HOURS
Status: DISCONTINUED | OUTPATIENT
Start: 2024-02-11 | End: 2024-02-11

## 2024-02-11 RX ORDER — CALCIUM CARBONATE 500 MG/1
1000 TABLET, CHEWABLE ORAL 4 TIMES DAILY PRN
Status: DISCONTINUED | OUTPATIENT
Start: 2024-02-11 | End: 2024-02-11

## 2024-02-11 RX ORDER — GUAIFENESIN 600 MG/1
15 TABLET, EXTENDED RELEASE ORAL DAILY
Status: DISCONTINUED | OUTPATIENT
Start: 2024-02-12 | End: 2024-02-16

## 2024-02-11 RX ORDER — ONDANSETRON 2 MG/ML
4 INJECTION INTRAMUSCULAR; INTRAVENOUS EVERY 6 HOURS PRN
Status: DISCONTINUED | OUTPATIENT
Start: 2024-02-11 | End: 2024-04-15 | Stop reason: HOSPADM

## 2024-02-11 RX ORDER — AMOXICILLIN 250 MG
2 CAPSULE ORAL 2 TIMES DAILY PRN
Status: DISCONTINUED | OUTPATIENT
Start: 2024-02-11 | End: 2024-03-18

## 2024-02-11 RX ORDER — HEPARIN SODIUM 5000 [USP'U]/.5ML
5000 INJECTION, SOLUTION INTRAVENOUS; SUBCUTANEOUS EVERY 12 HOURS
Status: DISCONTINUED | OUTPATIENT
Start: 2024-02-12 | End: 2024-02-14

## 2024-02-11 RX ORDER — AMOXICILLIN 250 MG
1 CAPSULE ORAL 2 TIMES DAILY PRN
Status: DISCONTINUED | OUTPATIENT
Start: 2024-02-11 | End: 2024-03-18

## 2024-02-11 RX ORDER — ONDANSETRON 4 MG/1
4 TABLET, ORALLY DISINTEGRATING ORAL EVERY 6 HOURS PRN
Status: DISCONTINUED | OUTPATIENT
Start: 2024-02-11 | End: 2024-04-15 | Stop reason: HOSPADM

## 2024-02-11 RX ORDER — ACETAMINOPHEN 325 MG/10.15ML
650 LIQUID ORAL EVERY 6 HOURS PRN
Status: DISCONTINUED | OUTPATIENT
Start: 2024-02-11 | End: 2024-02-15

## 2024-02-11 RX ADMIN — HEPARIN SODIUM 5000 UNITS: 5000 INJECTION, SOLUTION INTRAVENOUS; SUBCUTANEOUS at 16:25

## 2024-02-11 RX ADMIN — Medication 40 MG: at 09:34

## 2024-02-11 RX ADMIN — SEVELAMER CARBONATE 0.8 G: 800 POWDER, FOR SUSPENSION ORAL at 09:32

## 2024-02-11 RX ADMIN — SEVELAMER CARBONATE 0.8 G: 800 POWDER, FOR SUSPENSION ORAL at 12:06

## 2024-02-11 RX ADMIN — PREDNISONE 2.5 MG: 2.5 TABLET ORAL at 20:30

## 2024-02-11 RX ADMIN — METOPROLOL TARTRATE 25 MG: 100 TABLET, FILM COATED ORAL at 20:30

## 2024-02-11 RX ADMIN — PREDNISONE 5 MG: 5 TABLET ORAL at 09:32

## 2024-02-11 RX ADMIN — Medication 40 MG: at 20:30

## 2024-02-11 RX ADMIN — CYANOCOBALAMIN TAB 500 MCG 500 MCG: 500 TAB at 09:33

## 2024-02-11 RX ADMIN — TACROLIMUS 4 MG: 5 CAPSULE ORAL at 20:30

## 2024-02-11 RX ADMIN — Medication 1 TABLET: at 09:32

## 2024-02-11 RX ADMIN — ACETAMINOPHEN 650 MG: 325 SOLUTION ORAL at 13:55

## 2024-02-11 RX ADMIN — METOPROLOL TARTRATE 25 MG: 25 TABLET, FILM COATED ORAL at 09:33

## 2024-02-11 RX ADMIN — HEPARIN SODIUM 5000 UNITS: 5000 INJECTION, SOLUTION INTRAVENOUS; SUBCUTANEOUS at 09:34

## 2024-02-11 RX ADMIN — CALCIUM CARBONATE 600 MG (1,500 MG)-VITAMIN D3 400 UNIT TABLET 1 TABLET: at 18:34

## 2024-02-11 RX ADMIN — TACROLIMUS 4 MG: 5 CAPSULE ORAL at 09:33

## 2024-02-11 RX ADMIN — CALCIUM CARBONATE 600 MG (1,500 MG)-VITAMIN D3 400 UNIT TABLET 1 TABLET: at 09:32

## 2024-02-11 RX ADMIN — CALCIUM CARBONATE 600 MG (1,500 MG)-VITAMIN D3 400 UNIT TABLET 1 TABLET: at 12:06

## 2024-02-11 RX ADMIN — LOPERAMIDE HYDROCHLORIDE 2 MG: 2 TABLET ORAL at 18:34

## 2024-02-11 RX ADMIN — LOPERAMIDE HYDROCHLORIDE 2 MG: 2 TABLET ORAL at 13:55

## 2024-02-11 ASSESSMENT — ACTIVITIES OF DAILY LIVING (ADL)
ADLS_ACUITY_SCORE: 27
ADLS_ACUITY_SCORE: 26
ADLS_ACUITY_SCORE: 27
ADLS_ACUITY_SCORE: 27
ADLS_ACUITY_SCORE: 26
ADLS_ACUITY_SCORE: 27

## 2024-02-11 NOTE — RESULT ENCOUNTER NOTE
EBV 96,590 log 5.0 up from 1 month prior 76,994 log 4.9. Per protocol, repeat level in 3 months. Pt recently had CT c/a/p done. Provider not wanting to decrease IS any further at this time as patient is already on 2 drug IS

## 2024-02-11 NOTE — CONSULTS
"Nephrology consult  Reason for consult: ESKD  Consulting provider: Dr Herrera    62yo F with ESKD, lung transplant, and gastroparesis admitted with failure to thrive and unintentional weight loss. She also reports LE edema and L arm edema at the elbow. She reports feeling \"sick\" whenever she has tube feeds or oral food intake. She has nausea but no vomiting. 10pt ROS otherwise negative. Last dialysis was Saturday.    ESKD - TTS, LUE AVG, 3hr, 45.5kg, [facility], Dr Elias, venofer 50 qwk, Mircera last dose 1/9    Soc Hx - lives with daughter    Fam Hx - no CKD    Exam - 97.8   P81   R16   114/67   132/72  Gen - nad  Head/neck - NC/AT, neck supple, bruising along right side of face from recent fall  Eyes - anicteric  Ears - nl external exam  Nose - nl external exam  CV - rrr, +AVA, no rub  Resp - cta bilat  Abd - BS+, NT/ND  Ext - edema at L elbow and 4+ edema at ankles  Psych - pleasant, appropriate    Labs  K 3.8   M 2.2   Phos 2.9    Outpatient medications of note  Furosemide 40mg BID  Metoprolol 25mg BID  sevelamer TID    A/Rec: 62yo F with ESKD admitted with failure to thrive and unintentional weight loss. Primary team addressing these issues.  ESKD - no acute indication for dialysis. Plan for next HD Tuesday.    Hypertension - BP on low side, ok to continue furosemide and metoprolol    BMD - phos low at 2.9. Consider discontinuing sevelamer.    Dylan Bryan MD  [phone]      "

## 2024-02-11 NOTE — PLAN OF CARE
Goal Outcome Evaluation:      Plan of Care Reviewed With: patient    Overall Patient Progress: no changeOverall Patient Progress: no change    Outcome Evaluation: poor appetite, orders small breakfast and a burger for lunch, still having diarrhea, ambulating well to the bathroom      Problem: Adult Inpatient Plan of Care  Goal: Plan of Care Review  Description: The Plan of Care Review/Shift note should be completed every shift.  The Outcome Evaluation is a brief statement about your assessment that the patient is improving, declining, or no change.  This information will be displayed automatically on your shift  note.  Outcome: Not Progressing  Flowsheets (Taken 2/11/2024 2347)  Outcome Evaluation: poor appetite, orders small breakfast and a burger for lunch, still having diarrhea, ambulating well to the bathroom  Plan of Care Reviewed With: patient  Overall Patient Progress: no change     Problem: Malnutrition  Goal: Improved Nutritional Intake  Outcome: Not Progressing    Ate cereal for breakfast and burger and sandwich for lunch but she reported that after eating she immediately had diarrhea    Encouraged to eat dinner but she declined     Problem: Diarrhea  Goal: Effective Diarrhea Management  Outcome: Not Progressing    Multiple loose stools during shift, PRN imodium administered     Problem: Pain Acute  Goal: Optimal Pain Control and Function  Outcome: Progressing    Right side pain due to fall, PRN tylenol administered    Skin: Pt has wound from her fall, wound cleaned with anti-microbial spray, adaptic gauze and band-aid applied

## 2024-02-11 NOTE — PHARMACY-ADMISSION MEDICATION HISTORY
Pharmacist Admission Medication History    Admission medication history is complete. The information provided in this note is only as accurate as the sources available at the time of the update.    Information Source(s): Patient and CareEverywhere/SureScripts via phone    Pertinent Information: Sofie was an excellent historian. She was able to provide medications, dosages and most recent administrations with little or no prompting. All information was confirmed with the dispense report for accuracy.      Changes made to PTA medication list:  Added: furosemide, acetaminophen  Deleted: None  Changed: route changed to j-tube per patient all meds are via j-tube    Allergies reviewed with patient and updates made in EHR: yes    Medication History Completed By: ADRIANA BERNARD [facility] 2/10/2024 7:41 PM    Prior to Admission medications    Medication Sig Last Dose Taking? Auth Provider Long Term End Date   acetaminophen (TYLENOL) 500 MG tablet 500-1,000 mg by Per J Tube route 3 times daily as needed for mild pain 2/10/2024 Yes Unknown, Entered By History     B Complex-C-Folic Acid (DIALYVITE) TABS 1 tablet by Per J Tube route every morning 2/10/2024 Yes Reported, Patient     Calcium Carbonate-Vitamin D 600-10 MG-MCG TABS 1 tablet by Per J Tube route 3 times daily 2/10/2024 Yes Claribel Franks MD     dapsone 2 mg/mL SUSP 25 mLs (50 mg) by Per J Tube route Every Mon, Wed, Fri Morning 2/9/2024 Yes Claribel Franks MD No    furosemide (LASIX) 40 MG tablet 40 mg by Per J Tube route 2 times daily 2/10/2024 at 0930 Yes Unknown, Entered By History Yes    loperamide (IMODIUM A-D) 2 MG tablet 1 tablet (2 mg) by Per J Tube route 4 times daily as needed for diarrhea 2/10/2024 Yes Nguyen Johnson, CNP     metoprolol tartrate (LOPRESSOR) 50 MG tablet 0.5 tablets (25 mg) by Per Feeding Tube route 2 times daily 2/10/2024 at 0930 Yes Claribel Franks MD Yes    multivitamin (CENTRUM SILVER) tablet Take 1 tablet by mouth daily 2/10/2024  at 0930 Yes Kaylin Triana PA-C     pantoprazole (PROTONIX) 2 mg/mL SUSP suspension 20 mLs (40 mg) by Per J Tube route 2 times daily 2/10/2024 at 0930 Yes Claribel Franks MD     predniSONE (DELTASONE) 5 MG tablet Take 1 tablet (5 mg) by mouth every morning AND 0.5 tablets (2.5 mg) every evening. Take 1 tab (5mg) at AM and 1/2 tab (2.5) in pm  Patient taking differently: Take 1 tablet (5 mg) by J-tube every morning AND 0.5 tablets (2.5 mg) every evening. Take 1 tab (5mg) at AM and 1/2 tab (2.5) in pm 2/10/2024 at 0930 Yes Claribel Franks MD No    protein modular (PROSOURCE TF) LIQD 1 packet by Per Feeding Tube route daily  Patient taking differently: 1 packet by Per Feeding Tube route daily at 2 pm 2/9/2024 Yes Claribel Franks MD     sevelamer carbonate, RENVELA, 0.8 GM PACK Packet Take 1 packet (0.8 g) by mouth 3 times daily (with meals)  Patient taking differently: 0.8 g by Per J Tube route 3 times daily (with meals) Past Week Yes Kaylin Triana PA-C     tacrolimus (GENERIC) 1 mg/mL suspension Take 4 mLs (4 mg) by mouth every morning AND 4 mLs (4 mg) every evening.  Patient taking differently: Take 4 mLs (4 mg) by j-tube every morning AND 4 mLs (4 mg) every evening. 2/10/2024 at 0930 Yes Kaylin Triana PA-C Yes    vitamin B-12 (CYANOCOBALAMIN) 500 MCG tablet 1 tablet (500 mcg) by Per Feeding Tube route daily 2/10/2024 Yes Claribel Franks MD     albuterol (PROAIR HFA/PROVENTIL HFA/VENTOLIN HFA) 108 (90 BASE) MCG/ACT Inhaler Inhale 2 puffs into the lungs every 6 hours as needed for shortness of breath / dyspnea or wheezing   Reported, Patient Yes 8/15/22   insulin glargine (LANTUS PEN) 100 UNIT/ML pen Inject 7 Units Subcutaneous 2 times daily   Susan Delacruz MD Yes 10/8/22   ipratropium - albuterol 0.5 mg/2.5 mg/3 mL (DUONEB) 0.5-2.5 (3) MG/3ML neb solution Inhale 1 vial into the lungs every 4 hours as needed for shortness of breath / dyspnea   Unknown, Entered By History Yes  8/15/22   mycophenolate (GENERIC EQUIVALENT) 200 MG/ML suspension 3.75 mLs (750 mg) by Per J Tube route 2 times daily   Claribel Franks MD Yes 10/8/22   valGANciclovir (VALCYTE) 50 MG/ML solution 9 mLs (450 mg) by Oral or Feeding Tube route three times a week  Patient taking differently: 450 mg by Oral or Feeding Tube route three times a week Take on Mon/Wed/Fri   Claribel Franks MD Yes 10/8/22

## 2024-02-11 NOTE — PROGRESS NOTES
"[facility]    Medicine Progress Note - Hospitalist Service, GOLD TEAM 19    Date of Admission:  2/10/2024    Assessment & Plan   Sofie Rodriguez is a 63 year old female admitted on 2/10/2024.  Patient is an established lung transplant patient.  She is a direct admit from home for adult failure to thrive.  Patient reports living with her daughter and now 3 kids.  Patient reports an unintentional 40 pound weight loss in the last year.  Patient reports recent falls with walker and associated bruising on her face.  Patient receives tube feeds secondary to gastroparesis and small bowel dysmotility.  She reports inadequate intake from that source.  Patient can reportedly tolerate a regular diet orally.  TPN has been discussed, however will defer until a formal nutrition evaluation takes place.  Patient will be directly admitted to the internal medicine hospitalist service for further evaluation management.     # Adult failure to thrive  # Unintentional 40 pound weight loss  # Frequent falls  # Gastroparesis  # Small bowel dysmotility  # Feeding tube status  - Physical, Occupational Therapy evaluations  - Care management consult  - Nutrition consult; consider TPN?  - Patient can reportedly tolerate regular diet     # Hypertension  - Blood pressure appears at goal  - Continue PTA antihypertensive regimen     # End-stage renal disease on hemodialysis  - Nephrology consult to manage dialysis schedule     # Status post lung transplant  - Lung transplant consult  - Continue PTA immunosuppressive regimen          Diet: Regular Diet Adult    DVT Prophylaxis: Heparin SQ  Dong Catheter: Not present  Lines: PRESENT             Cardiac Monitoring: None  Code Status: Full Code      Clinically Significant Risk Factors Present on Admission                       # Cachexia: Estimated body mass index is 16.93 kg/m  as calculated from the following:    Height as of 2/7/24: 1.6 m (5' 3\").    " Weight as of this encounter: 43.4 kg (95 lb 9.6 oz).              Disposition Plan      Expected Discharge Date: 02/13/2024                    ANAY SMITH MD  Hospitalist Service, GOLD TEAM 19  [facility]  Securely message with [employer] (more info)  Text page via [facility] Paging/Directory   See signed in provider for up to date coverage information  ______________________________________________________________________    Interval History   - per nephrology no indication for dialysis.   - afebrile and HDS    Physical Exam   Vital Signs: Temp: 97.8  F (36.6  C) Temp src: Oral BP: 132/72 Pulse: 84   Resp: 16 SpO2: 96 % O2 Device: None (Room air)    Weight: 95 lbs 9.6 oz    General Appearance: Lying in bed on room air in NAD  HEENT: PERRL: EOMI; moist mucous membrane w/o lesions  Neck: No JVD  Pulmonary: Clear to auscultation bilaterally, no wheezes or crackles  CVS: Regular rhythm, no murmurs, rubs or gallops  GI: BS (+), soft nontender, no rebound or guarding   Extremities: No LE edema.    Skin: echymosis noted in right periorbital region, right upper extremity; left upper extremity markedly more swollen compared to RUE.  Neurologic: A&O x3      Medical Decision Making             Data

## 2024-02-11 NOTE — PROVIDER NOTIFICATION
Provider paged and notified regarding patient's arrival to floor. Also let them know that patient has no nursing orders.

## 2024-02-11 NOTE — PLAN OF CARE
ADMISSION to [address] [place] [address] UNIT:    Sofie Rodriguez was admitted from home for FFT.  2 RN skin assessment: completed by Lay  Result of skin assessment and interventions/actions: right eye, face, neck, arm hip and foot bruising from fall on 2/8. Intergluteal cleft blanchable redness  Height, weight: completed? yes  Patient belongings & admission documents: see Flowsheets, completed? yes  MDRO education added to care plan: yes

## 2024-02-11 NOTE — PLAN OF CARE
No acute changes overnight   Alert and oriented X 4. RA. VSS.   C/O of pain from bruising related to previous fall Tylenol given.    Ambulated to bathroom SBA .   Was able to eat 100% cereals offered.   Takes meds through J tube per pt.  Dialysis tue, thur and sat per pt .

## 2024-02-11 NOTE — PROGRESS NOTES
02/11/24 1516   Appointment Info   Signing Clinician's Name / Credentials (PT) Irina Anya       Present no   Language English   Living Environment   People in Home grandchild(ray);child(ray), adult   Current Living Arrangements house   Home Accessibility stairs to enter home;stairs within home   Number of Stairs, Main Entrance 1   Stair Railings, Main Entrance none   Number of Stairs, Within Home, Primary other (see comments)  (4 right inside house, then to get to bedroom 4 steps then a flight of stairs.  The 2 sets of 4 steps has railings on the left, but flight of stairs on the right.)   Stair Railings, Within Home, Primary railing on left side (ascending);railing on right side (ascending)   Self-Care   Usual Activity Tolerance moderate   Current Activity Tolerance fair   Regular Exercise Other (see comments)  (Pt reported that she was suppose to start PT this week.)   Equipment Currently Used at Home walker, rolling  (pt reported using 4-ww mainly outside of the home but if feeling fatigued will use within the home.)   Fall history within last six months yes   Number of times patient has fallen within last six months 2  (Pt reported most recent fall was when she was sitting on her 4-ww walker trying to fix the brake and it moved out from under her.)   Activity/Exercise/Self-Care Comment Pt reported being independent with ADLs.   General Information   Onset of Illness/Injury or Date of Surgery 02/11/24   Referring Physician Kaylin Triana PA-C   Patient/Family Therapy Goals Statement (PT) To get stronger   Pertinent History of Current Problem (include personal factors and/or comorbidities that impact the POC) Pt is a 62 y/o female admitted for adult failure to thrive.  Pt has recently has 2 falls.   Existing Precautions/Restrictions fall   General Observations Pt supine in bed at start of PT evaluation, reported not feeling very well today.   Cognition   Affect/Mental Status  (Cognition) WNL   Orientation Status (Cognition) other (see comments)  (Not tested)   Follows Commands (Cognition) WNL   Pain Assessment   Patient Currently in Pain Yes, see Vital Sign flowsheet   Integumentary/Edema   Integumentary/Edema other (describe)   Integumentary/Edema Comments Bruising along right side of her face, neck, abrasions on right arm and leg due to fall.   Posture    Posture Forward head position;Protracted shoulders   Range of Motion (ROM)   Range of Motion ROM is WFL   Strength (Manual Muscle Testing)   Strength (Manual Muscle Testing) strength is WFL   Strength Comments Pt demonstrated functional strength for transfers and short distance gait, but is deconditioned.   Bed Mobility   Comment, (Bed Mobility) Supine to sitting at EOB with HOB elevated and SBA x 1.  Did not observe sitting to supine.   Transfers   Comment, (Transfers) Sit to standing from EOB without AD and SBA x 1.   Gait/Stairs (Locomotion)   Comment, (Gait/Stairs) Pt ambulated approximately 20' without 4-ww and close SBA x 1.   Balance   Balance Comments Pt demonstrated good sitting balance at EOB and fair standing balance.   Sensory Examination   Sensory Perception Comments Pt reported numbness in bilateral feet.   Clinical Impression   Criteria for Skilled Therapeutic Intervention Yes, treatment indicated   PT Diagnosis (PT) Decreased strength and impaired functional moblity.   Influenced by the following impairments Failure to thrive, significant weight loss, falls, and complex medical history   Functional limitations due to impairments Impaired bed mobility, transfers and gait.   Clinical Presentation (PT Evaluation Complexity) stable   Clinical Presentation Rationale Per clinical judgement   Clinical Decision Making (Complexity) low complexity   Planned Therapy Interventions (PT) bed mobility training;gait training;strengthening;stair training;transfer training   Risk & Benefits of therapy have been explained  evaluation/treatment results reviewed;care plan/treatment goals reviewed;patient   PT Total Evaluation Time   PT Eval, Low Complexity Minutes ([id number]) 15   Physical Therapy Goals   PT Frequency 5x/week   PT Predicted Duration/Target Date for Goal Attainment 02/18/24   PT Goals Bed Mobility;Transfers;Gait;Stairs   PT: Bed Mobility Independent;Supine to/from sit   PT: Transfers Modified independent;Sit to/from stand;Assistive device   PT: Gait Modified independent;Assistive device;Greater than 200 feet   PT: Stairs Supervision/stand-by assist;Rail on left;3 stairs  (6 stairs with railing on right and 1 step without a railing)   PT Discharge Planning   PT Plan Continue skilled PT for bed mobility, transfers, increase gait distance, and initiate strengthening.   PT Discharge Recommendation (DC Rec) home with assist;home with outpatient physical therapy   PT Rationale for DC Rec Anticipate pt will be able to discharge to home and would benefit from OP PT.  Pt was suppose to start OP this week.   PT Brief overview of current status Pt is SBA without AD   PT Equipment Needed at Discharge other (see comments)  (No equipment needs)   Total Session Time   Total Session Time (sum of timed and untimed services) 15

## 2024-02-11 NOTE — H&P
[facility]    History and Physical - Hospitalist Service, GOLD TEAM        Date of Admission:  2/10/2024    Assessment & Plan      Sofie Rodriguez is a 63 year old female admitted on 2/10/2024.  Patient is an established lung transplant patient.  She is a direct admit from home for adult failure to thrive.  Patient reports living with her daughter and now 3 kids.  Patient reports an unintentional 40 pound weight loss in the last year.  Patient reports recent falls with walker and associated bruising on her face.  Patient receives tube feeds secondary to gastroparesis and small bowel dysmotility.  She reports inadequate intake from that source.  Patient can reportedly tolerate a regular diet orally.  TPN has been discussed, however will defer until a formal nutrition evaluation takes place.  Patient will be directly admitted to the internal medicine hospitalist service for further evaluation management.    # Adult failure to thrive  # Unintentional 40 pound weight loss  # Frequent falls  # Gastroparesis  # Small bowel dysmotility  # Feeding tube status  - Physical, Occupational Therapy evaluations  - Care management consult  - Nutrition consult; consider TPN?  - Patient can reportedly tolerate regular diet    # Hypertension  - Blood pressure appears at goal  - Continue PTA antihypertensive regimen    # End-stage renal disease on hemodialysis  - Nephrology consult to manage dialysis schedule    # Status post lung transplant  - Lung transplant consult  - Continue PTA immunosuppressive regimen          Diet: Regular Diet Adult    DVT Prophylaxis: Heparin SQ  Dong Catheter: Not present  Lines: PRESENT             Cardiac Monitoring: None  Code Status:  Full resuscitation status    Clinically Significant Risk Factors Present on Admission                       # Cachexia: Estimated body mass index is 16.93 kg/m  as calculated from the following:    Height as of 2/7/24: 1.6 m  "(5' 3\").    Weight as of this encounter: 43.4 kg (95 lb 9.6 oz).              Disposition Plan           Milton Herrera DO, S  Hospitalist Service, [facility]  Securely message with [employer] (more info)  Text page via [facility] Paging/Directory   See signed in provider for up to date coverage information    ______________________________________________________________________    Chief Complaint   Adult failure to thrive; lung transplant status.    History is obtained from the patient & electronic medical record.    History of Present Illness   Sofie Rodriguez is a 63 year old female admitted on 2/10/2024.  Patient is an established lung transplant patient.  She is a direct admit from home for adult failure to thrive.  Patient reports living with her daughter and now 3 kids.  Patient reports an unintentional 40 pound weight loss in the last year.  Patient reports recent falls with walker and associated bruising on her face.  Patient receives tube feeds secondary to gastroparesis and small bowel dysmotility.  She reports inadequate intake from that source.  Patient can reportedly tolerate a regular diet orally.  TPN has been discussed, however will defer until a formal nutrition evaluation takes place.  Patient will be directly admitted to the internal medicine hospitalist service for further evaluation management.    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    Hepatitis 2017    Hep C, [facility]    History of blood transfusion     HTN (hypertension)     Infectious mononucleosis     Lung infection 11/30/2022    Osteopenia        Past Surgical History   Past Surgical History:   Procedure Laterality Date    BRONCHOSCOPY (RIGID OR " FLEXIBLE), DIAGNOSTIC N/A 08/02/2022    Procedure: BRONCHOSCOPY, DIAGNOSTIC- inspection Bronch;  Surgeon: Kamala Lovell MD;  [facility]    BRONCHOSCOPY (RIGID OR FLEXIBLE), DIAGNOSTIC N/A 09/13/2022    Procedure: INSPECTION BRONCHOSCOPY, WITH BRONCHOALVEOLAR LAVAGE;  Surgeon: Jose R Mccullough MD;  [facility]    BRONCHOSCOPY (RIGID OR FLEXIBLE), DIAGNOSTIC N/A 11/09/2022    Procedure: BRONCHOSCOPY, WITH BRONCHOALVEOLAR LAVAGE AND BIOPSY;  Surgeon: Cesar Lima MD;  [facility]    BRONCHOSCOPY (RIGID OR FLEXIBLE), DIAGNOSTIC N/A 01/25/2023    Procedure: BRONCHOSCOPY, WITH BRONCHOALVEOLAR LAVAGE AND BIOPSY;  Surgeon: Mason Reddy MD;  [facility]    BRONCHOSCOPY (RIGID OR FLEXIBLE), DIAGNOSTIC N/A 04/19/2023    Procedure: BRONCHOSCOPY, WITH BRONCHOALVEOLAR LAVAGE AND BIOPSY;  Surgeon: Kamala Lovell MD;  Location:  GI    BRONCHOSCOPY (RIGID OR FLEXIBLE), DIAGNOSTIC N/A 07/12/2023    Procedure: BRONCHOSCOPY, WITH BRONCHOALVEOLAR LAVAGE AND BIOPSY;  Surgeon: Cesar Lima MD;  Location:  GI    BRONCHOSCOPY FLEXIBLE AND RIGID N/A 07/19/2022    Procedure: BRONCHOSCOPY inspection only;  Surgeon: Bob Liao MD;  Location:  GI    COLONOSCOPY  2015    CORONARY ANGIOGRAPHY ADULT ORDER      CV CORONARY ANGIOGRAM N/A 06/30/2021    Procedure: CV CORONARY ANGIOGRAM;  Surgeon: Alexander Cuellar MD;  Location:  HEART CARDIAC CATH LAB    CV RIGHT HEART CATH MEASUREMENTS RECORDED N/A 06/30/2021    Procedure: CV RIGHT HEART CATH;  Surgeon: Alexander Cuellar MD;  Location:  HEART CARDIAC CATH LAB    ENDOSCOPIC PERORAL MYOTOMY N/A 10/09/2023    Procedure: MYOTOMY, ESOPHAGUS, ENDOSCOPIC, ORAL APPROACH;  Surgeon: Gonzalez Navarro MD;  [facility]    ENDOSCOPIC RETROGRADE CHOLANGIOPANCREATOGRAM N/A 08/11/2022    Procedure: ENDOSCOPIC RETROGRADE CHOLANGIOPANCREATOGRAPHY WITH PANCREATIC DUCT NEEDLE KNIFE AND STENT PLACEMENT, BILE DUCT SPHINCTEROTOMY, BLOOD/DEBRIS REMOVAL AND STENT  PLACEMENT;  Surgeon: Cosmo Arroyo MD;  [facility]    ENDOSCOPIC RETROGRADE CHOLANGIOPANCREATOGRAM N/A 10/07/2022    Procedure: ENDOSCOPIC RETROGRADE CHOLANGIOPANCREATOGRAPHY with biliary and pancreatic stent removal, debris removal;  Surgeon: Cosmo Arroyo MD;  [facility]    ENT SURGERY  1974    tonsillectomy    ENTEROSCOPY SMALL BOWEL N/A 08/11/2022    Procedure: SMALL BOWEL ENTEROSCOPY;  Surgeon: Cosmo Arroyo MD;  [facility]    ESOPHAGOGASTRODUODENOSCOPY, WITH NASOGASTRIC TUBE INSERTION N/A 07/01/2022    Procedure: ESOPHAGOGASTRODUODENOSCOPY, WITH NASOJEJUNAL TUBE INSERTION;  Surgeon: Ozzy Nickerson MD;  [facility]    ESOPHAGOSCOPY, GASTROSCOPY, DUODENOSCOPY (EGD), COMBINED N/A 08/03/2022    Procedure: ESOPHAGOGASTRODUODENOSCOPY (EGD);  Surgeon: Ira Andres MD;  [facility]    ESOPHAGOSCOPY, GASTROSCOPY, DUODENOSCOPY (EGD), COMBINED N/A 01/25/2023    Procedure: ESOPHAGOGASTRODUODENOSCOPY (EGD) with botox injection;  Surgeon: Gonzalez Navarro MD;  [facility]    ESOPHAGOSCOPY, GASTROSCOPY, DUODENOSCOPY (EGD), COMBINED N/A 10/09/2023    Procedure: ESOPHAGOGASTRODUODENOSCOPY;  Surgeon: Gonzalez Navarro MD;  Location:  OR    HAND SURGERY      INSERT CHEST TUBE Right 09/13/2022    Procedure: Insert chest tube;  Surgeon: Jose R Mccullough MD;  [facility]    IR CVC TUNNEL PLACEMENT > 5 YRS OF AGE  09/26/2022    IR GASTRO JEJUNOSTOMY TUBE CHANGE  08/31/2022    IR GASTRO JEJUNOSTOMY TUBE CHANGE  12/21/2022    IR GASTRO JEJUNOSTOMY TUBE CHANGE  07/12/2023    IR GASTRO JEJUNOSTOMY TUBE CHANGE  08/18/2023    IR GASTRO JEJUNOSTOMY TUBE CHANGE  11/14/2023    IR GASTRO JEJUNOSTOMY TUBE PLACEMENT  07/27/2022    IR THORACENTESIS  08/29/2022    LEEP TX, CERVICAL  04/07/2017    HECTOR III    LYMPH NODE BIOPSY Left 2005    Left axilla, benign- [place]    MIDLINE INSERTION - DOUBLE LUMEN Left 07/28/2022    20cm, Basilic vein    REPLACE  GASTROJEJUNOSTOMY TUBE, PERCUTANEOUS  10/07/2022    Procedure: Replace Gastrojejunostomy Tube;  Surgeon: Cosmo Arroyo MD;  [facility]    THORACENTESIS Left 2022    Procedure: THORACENTESIS;  Surgeon: Bo Capone PA-C;  Location: [facility] OR    THORACENTESIS Left 2022    Procedure: Thoracentesis;  Surgeon: Jose R Mccullough MD;  [facility]    THROMBECTOMY UPPER EXTREMITY Left 2022    Procedure: LEFT RADIAL ARM THROMBECTOMY;  Surgeon: Christie Graham MD;  [facility]    TRANSPLANT LUNG RECIPIENT SINGLE X2 Bilateral 2022    Procedure: Clamshell Incision, Bilateral Sequential Lung Transplant, On Cardiopulmonary Bypass, Flexible Bronchoscopy;  Surgeon: Sue Sunshine MD;  [facility]       Prior to Admission Medications   Prior to Admission Medications   Prescriptions Last Dose Informant Patient Reported? Taking?   B Complex-C-Folic Acid (DIALYVITE) TABS   Yes No   Sig: Take 1 tablet by mouth every morning   Calcium Carbonate-Vitamin D 600-10 MG-MCG TABS   No No   Si tablet by Per J Tube route 3 times daily   dapsone 2 mg/mL SUSP   No No   Si mLs (50 mg) by Per J Tube route Every Mon, Wed, Fri Morning   loperamide (IMODIUM A-D) 2 MG tablet   Yes No   Si tablet (2 mg) by Per J Tube route 4 times daily as needed for diarrhea   metoprolol tartrate (LOPRESSOR) 50 MG tablet   No No   Si.5 tablets (25 mg) by Per Feeding Tube route 2 times daily   multivitamin (CENTRUM SILVER) tablet   No No   Sig: Take 1 tablet by mouth daily   pantoprazole (PROTONIX) 2 mg/mL SUSP suspension   No No   Si mLs (40 mg) by Per J Tube route 2 times daily   predniSONE (DELTASONE) 5 MG tablet   No No   Sig: Take 1 tablet (5 mg) by mouth every morning AND 0.5 tablets (2.5 mg) every evening. Take 1 tab (5mg) at AM and 1/2 tab (2.5) in pm   protein modular (PROSOURCE TF) LIQD   No No   Si packet by Per Feeding Tube route daily   Patient taking differently: 1  packet by Per Feeding Tube route daily at 2 pm   sevelamer carbonate, RENVELA, 0.8 GM PACK Packet   No No   Sig: Take 1 packet (0.8 g) by mouth 3 times daily (with meals)   tacrolimus (GENERIC) 1 mg/mL suspension   No No   Sig: Take 4 mLs (4 mg) by mouth every morning AND 4 mLs (4 mg) every evening.   vitamin B-12 (CYANOCOBALAMIN) 500 MCG tablet   No No   Si tablet (500 mcg) by Per Feeding Tube route daily      Facility-Administered Medications: None        Physical Exam   Vital Signs: Temp: 97.8  F (36.6  C) Temp src: Oral BP: 114/67 Pulse: 81     SpO2: 97 % O2 Device: None (Room air)    Weight: 95 lbs 9.6 oz    GENERAL: Alert and oriented x 3; no acute distress; well-nourished.  HEENT: Normocephalic; atraumatic; PERRLA; MMM.  CV: RRR; normal S1, S2; no rubs, murmurs, or gallops.  RESP: Lung fields clear to aucultation B/L; no wheezing or crepitations.  GI: Abdomen is soft, nontender, nondistended; feeding tube.  : Deferred genital examination.   MSK: No clubbing, cyanosis, or edema.  DERM: Skin is intact; no rash, lesions, or skin breakdown.  NEURO: No focal deficits appreciated; strength & sensorium are grossly intact.  PSYCH: No active hallucinations; affect, insight appear within normal limits.    Medical Decision Making       65 MINUTES SPENT BY ME on the date of service doing chart review, history, exam, documentation & further activities per the note.      Data         Imaging results reviewed over the past 24 hrs:   No results found for this or any previous visit (from the past 24 hour(s)).

## 2024-02-12 ENCOUNTER — APPOINTMENT (OUTPATIENT)
Dept: GENERAL RADIOLOGY | Facility: CLINIC | Age: 64
DRG: 640 | End: 2024-02-12
Attending: INTERNAL MEDICINE
Payer: MEDICARE

## 2024-02-12 LAB
ALBUMIN SERPL BCG-MCNC: 3.6 G/DL (ref 3.5–5.2)
ALP SERPL-CCNC: 67 U/L (ref 40–150)
ALT SERPL W P-5'-P-CCNC: 7 U/L (ref 0–50)
ANION GAP SERPL CALCULATED.3IONS-SCNC: 11 MMOL/L (ref 7–15)
AST SERPL W P-5'-P-CCNC: 17 U/L (ref 0–45)
BASOPHILS # BLD AUTO: ABNORMAL 10*3/UL
BASOPHILS # BLD MANUAL: 0.1 10E3/UL (ref 0–0.2)
BASOPHILS NFR BLD AUTO: ABNORMAL %
BASOPHILS NFR BLD MANUAL: 1 %
BILIRUB SERPL-MCNC: 0.3 MG/DL
BUN SERPL-MCNC: 27.1 MG/DL (ref 8–23)
CALCIUM SERPL-MCNC: 8.9 MG/DL (ref 8.8–10.2)
CHLORIDE SERPL-SCNC: 107 MMOL/L (ref 98–107)
CREAT SERPL-MCNC: 4.39 MG/DL (ref 0.51–0.95)
DEPRECATED HCO3 PLAS-SCNC: 26 MMOL/L (ref 22–29)
EGFRCR SERPLBLD CKD-EPI 2021: 11 ML/MIN/1.73M2
EOSINOPHIL # BLD AUTO: ABNORMAL 10*3/UL
EOSINOPHIL # BLD MANUAL: 0.2 10E3/UL (ref 0–0.7)
EOSINOPHIL NFR BLD AUTO: ABNORMAL %
EOSINOPHIL NFR BLD MANUAL: 3 %
ERYTHROCYTE [DISTWIDTH] IN BLOOD BY AUTOMATED COUNT: 15.4 % (ref 10–15)
GLUCOSE BLDC GLUCOMTR-MCNC: 129 MG/DL (ref 70–99)
GLUCOSE SERPL-MCNC: 177 MG/DL (ref 70–99)
HCT VFR BLD AUTO: 30.9 % (ref 35–47)
HGB BLD-MCNC: 9.2 G/DL (ref 11.7–15.7)
IMM GRANULOCYTES # BLD: ABNORMAL 10*3/UL
IMM GRANULOCYTES NFR BLD: ABNORMAL %
LYMPHOCYTES # BLD AUTO: ABNORMAL 10*3/UL
LYMPHOCYTES # BLD MANUAL: 1.4 10E3/UL (ref 0.8–5.3)
LYMPHOCYTES NFR BLD AUTO: ABNORMAL %
LYMPHOCYTES NFR BLD MANUAL: 26 %
MAGNESIUM SERPL-MCNC: 2.4 MG/DL (ref 1.7–2.3)
MCH RBC QN AUTO: 35.9 PG (ref 26.5–33)
MCHC RBC AUTO-ENTMCNC: 29.8 G/DL (ref 31.5–36.5)
MCV RBC AUTO: 121 FL (ref 78–100)
MONOCYTES # BLD AUTO: ABNORMAL 10*3/UL
MONOCYTES # BLD MANUAL: 0.6 10E3/UL (ref 0–1.3)
MONOCYTES NFR BLD AUTO: ABNORMAL %
MONOCYTES NFR BLD MANUAL: 11 %
NEUTROPHILS # BLD AUTO: ABNORMAL 10*3/UL
NEUTROPHILS # BLD MANUAL: 3.1 10E3/UL (ref 1.6–8.3)
NEUTROPHILS NFR BLD AUTO: ABNORMAL %
NEUTROPHILS NFR BLD MANUAL: 59 %
NRBC # BLD AUTO: 0 10E3/UL
NRBC BLD AUTO-RTO: 0 /100
PHOSPHATE SERPL-MCNC: 3.5 MG/DL (ref 2.5–4.5)
PLAT MORPH BLD: ABNORMAL
PLATELET # BLD AUTO: 216 10E3/UL (ref 150–450)
POLYCHROMASIA BLD QL SMEAR: SLIGHT
POTASSIUM SERPL-SCNC: 4 MMOL/L (ref 3.4–5.3)
PROT SERPL-MCNC: 5.9 G/DL (ref 6.4–8.3)
RBC # BLD AUTO: 2.56 10E6/UL (ref 3.8–5.2)
RBC MORPH BLD: ABNORMAL
SODIUM SERPL-SCNC: 144 MMOL/L (ref 135–145)
STOMATOCYTES BLD QL SMEAR: SLIGHT
TACROLIMUS BLD-MCNC: 5.9 UG/L (ref 5–15)
TME LAST DOSE: NORMAL H
TME LAST DOSE: NORMAL H
WBC # BLD AUTO: 5.3 10E3/UL (ref 4–11)

## 2024-02-12 PROCEDURE — 80053 COMPREHEN METABOLIC PANEL: CPT | Performed by: INTERNAL MEDICINE

## 2024-02-12 PROCEDURE — 99222 1ST HOSP IP/OBS MODERATE 55: CPT | Performed by: PHYSICIAN ASSISTANT

## 2024-02-12 PROCEDURE — 99232 SBSQ HOSP IP/OBS MODERATE 35: CPT | Performed by: INTERNAL MEDICINE

## 2024-02-12 PROCEDURE — 85027 COMPLETE CBC AUTOMATED: CPT | Performed by: INTERNAL MEDICINE

## 2024-02-12 PROCEDURE — 83735 ASSAY OF MAGNESIUM: CPT | Performed by: INTERNAL MEDICINE

## 2024-02-12 PROCEDURE — 250N000012 HC RX MED GY IP 250 OP 636 PS 637: Performed by: INTERNAL MEDICINE

## 2024-02-12 PROCEDURE — 250N000009 HC RX 250: Performed by: INTERNAL MEDICINE

## 2024-02-12 PROCEDURE — 80197 ASSAY OF TACROLIMUS: CPT | Performed by: INTERNAL MEDICINE

## 2024-02-12 PROCEDURE — 85007 BL SMEAR W/DIFF WBC COUNT: CPT | Performed by: INTERNAL MEDICINE

## 2024-02-12 PROCEDURE — 250N000013 HC RX MED GY IP 250 OP 250 PS 637: Performed by: INTERNAL MEDICINE

## 2024-02-12 PROCEDURE — B4185 PARENTERAL SOL 10 GM LIPIDS: HCPCS | Mod: JZ | Performed by: INTERNAL MEDICINE

## 2024-02-12 PROCEDURE — 250N000009 HC RX 250: Mod: JZ | Performed by: INTERNAL MEDICINE

## 2024-02-12 PROCEDURE — 73502 X-RAY EXAM HIP UNI 2-3 VIEWS: CPT

## 2024-02-12 PROCEDURE — 120N000002 HC R&B MED SURG/OB UMMC

## 2024-02-12 PROCEDURE — 36415 COLL VENOUS BLD VENIPUNCTURE: CPT | Performed by: INTERNAL MEDICINE

## 2024-02-12 PROCEDURE — 84100 ASSAY OF PHOSPHORUS: CPT | Performed by: INTERNAL MEDICINE

## 2024-02-12 PROCEDURE — 250N000011 HC RX IP 250 OP 636: Performed by: INTERNAL MEDICINE

## 2024-02-12 PROCEDURE — 99233 SBSQ HOSP IP/OBS HIGH 50: CPT | Performed by: INTERNAL MEDICINE

## 2024-02-12 RX ORDER — PREDNISONE 2.5 MG/1
2.5 TABLET ORAL EVERY EVENING
Status: DISCONTINUED | OUTPATIENT
Start: 2024-02-12 | End: 2024-04-04

## 2024-02-12 RX ORDER — DEXTROSE MONOHYDRATE 100 MG/ML
INJECTION, SOLUTION INTRAVENOUS CONTINUOUS PRN
Status: DISCONTINUED | OUTPATIENT
Start: 2024-02-12 | End: 2024-02-25

## 2024-02-12 RX ORDER — SEVELAMER CARBONATE FOR ORAL SUSPENSION 800 MG/1
0.8 POWDER, FOR SUSPENSION ORAL 2 TIMES DAILY
Status: DISCONTINUED | OUTPATIENT
Start: 2024-02-13 | End: 2024-04-10

## 2024-02-12 RX ORDER — PREDNISONE 5 MG/1
5 TABLET ORAL EVERY MORNING
Status: DISCONTINUED | OUTPATIENT
Start: 2024-02-13 | End: 2024-04-15 | Stop reason: HOSPADM

## 2024-02-12 RX ADMIN — Medication 40 MG: at 20:51

## 2024-02-12 RX ADMIN — CYANOCOBALAMIN TAB 500 MCG 500 MCG: 500 TAB at 08:22

## 2024-02-12 RX ADMIN — TACROLIMUS 4 MG: 5 CAPSULE ORAL at 08:49

## 2024-02-12 RX ADMIN — OLIVE OIL AND SOYBEAN OIL 250 ML: 16; 4 INJECTION, EMULSION INTRAVENOUS at 21:28

## 2024-02-12 RX ADMIN — PREDNISONE 2.5 MG: 2.5 TABLET ORAL at 20:51

## 2024-02-12 RX ADMIN — SEVELAMER CARBONATE 0.8 G: 800 POWDER, FOR SUSPENSION ORAL at 08:21

## 2024-02-12 RX ADMIN — METOPROLOL TARTRATE 25 MG: 100 TABLET, FILM COATED ORAL at 20:52

## 2024-02-12 RX ADMIN — CALCIUM CARBONATE 600 MG (1,500 MG)-VITAMIN D3 400 UNIT TABLET 1 TABLET: at 08:21

## 2024-02-12 RX ADMIN — TACROLIMUS 4.5 MG: 5 CAPSULE ORAL at 20:51

## 2024-02-12 RX ADMIN — LOPERAMIDE HYDROCHLORIDE 2 MG: 2 TABLET ORAL at 08:21

## 2024-02-12 RX ADMIN — CALCIUM CARBONATE 600 MG (1,500 MG)-VITAMIN D3 400 UNIT TABLET 1 TABLET: at 12:53

## 2024-02-12 RX ADMIN — SEVELAMER CARBONATE 0.8 G: 800 POWDER, FOR SUSPENSION ORAL at 12:53

## 2024-02-12 RX ADMIN — CALCIUM CARBONATE 600 MG (1,500 MG)-VITAMIN D3 400 UNIT TABLET 1 TABLET: at 18:34

## 2024-02-12 RX ADMIN — Medication 15 ML: at 08:21

## 2024-02-12 RX ADMIN — LOPERAMIDE HYDROCHLORIDE 2 MG: 2 TABLET ORAL at 18:41

## 2024-02-12 RX ADMIN — METOPROLOL TARTRATE 25 MG: 100 TABLET, FILM COATED ORAL at 08:49

## 2024-02-12 RX ADMIN — DAPSONE 50 MG: 100 TABLET ORAL at 08:49

## 2024-02-12 RX ADMIN — HEPARIN SODIUM 5000 UNITS: 5000 INJECTION, SOLUTION INTRAVENOUS; SUBCUTANEOUS at 12:53

## 2024-02-12 RX ADMIN — Medication 40 MG: at 08:22

## 2024-02-12 RX ADMIN — ACETAMINOPHEN 650 MG: 325 SOLUTION ORAL at 08:21

## 2024-02-12 RX ADMIN — PREDNISONE 5 MG: 5 TABLET ORAL at 08:22

## 2024-02-12 ASSESSMENT — ACTIVITIES OF DAILY LIVING (ADL)
ADLS_ACUITY_SCORE: 27

## 2024-02-12 NOTE — PROGRESS NOTES
"CLINICAL NUTRITION SERVICES - ASSESSMENT NOTE     Nutrition Prescription    RECOMMENDATIONS FOR MDs/PROVIDERS TO ORDER:  None    Malnutrition Status:    Severe malnutrition in context of chronic disease    Recommendations already ordered by Registered Dietitian (RD):  Dosing weight:  43.4 kg  Access: Central line, to be placed  Initial parameters (per day)  Volume: 1440 mL (per nephrology)  Dextrose: 100 g  AA: 61 g  Lipids: 250 mL 20%, daily    Dextrose titration:   Monitor lytes and if within acceptable parameters (Mg++ > or = 1.5, K+ is > or = 3, and PO4 > or = 1.9), increase dextrose by 50 g/day to goal of 250 g dextrose.      Goal PN provides 250 g dextrose, 61 g AA, and 250 mL 20% lipids seven days per week for total provision of 1594 Kcals (37 Kcals/kg), 1.4 g/kg protein, GIR 4 mg/kg/minute, and 31% fat kcals on average daily.       Future/Additional Recommendations:  Monitor labs for RFS, weight, PO intake tolerance, TPN tolerance     REASON FOR ASSESSMENT  Sofie Rodriguez is a 63 year old female assessed by the dietitian for Provider Order - Gastroparesis; small bowel dysmotility; 40-lbs weight loss  TPN support  Reason for admission: FTT  PMH: lung transplant, gastroparesis, ESRD on HD    NUTRITION HISTORY  Patient receives tube feeds secondary to gastroparesis and small bowel dysmotility. She reports inadequate intake from that source. Patient can reportedly tolerate a regular diet orally   She reports feeling \"sick\" whenever she has tube feeds or oral food intake. She has nausea but no vomiting   Per outpatient RD note 2/07: We recently tried her on a short-term Vivonex RTF trial due to poor tolerance of low rate semi-elemental formulas in the past (see prior notes for rate details, etc). In summary, for many mos, she has either not run her TF or been running the equivalent of up to 300 calories/day + eating 1 meal (which would be 1 boiled egg). Fecal elastase normal. Main barriers that we can determine " "for poor PO/TF tolerance are severe GP, N/V/D.      Plan to admit pt within the next week to start TPN/lipids. Dialysis unit OK with this due to hopeful plan to cycle TPN overnight to not interfere w/ dialysis. I am concerned for possible refeeding risk. Inpatient RD to assess/follow with initiation of TPN and electrolyte changes.   Although there may be pros to continuing trickle feeds while on goal TPN in order to maintain some sort of gut integrity, unsure if this really will be helpful or realistic, as Sofie has admitted to not running feeds as directed. Maybe a \"bowel rest\" sort of situation with oral intake for pleasure (expected to be minimal) may be helpful.   Today pt reports 1-2 small meals daily: one hard boiled egg, or one slice of peanut butter toast, or a bowl of cereal with milk. 1-2 x/week pt will have about a 3 oz piece of steak and 1/2 baked potato with butter. If she has the last meal then she will eat only one meal for that day.  TF: pt reports original orders for TF were for four cans daily but she was never able to achieve that 2/2 nausea. Pt reports when she was on Vivonex and [employer] she was only able to tolerate 5-6 cans over 2 weeks (each formula).  Pt report diarrhea 3-6x/day x 18 months.  Pt states G-J placed 18 months ago.    CURRENT NUTRITION ORDERS  Diet: Regular  Intake/Tolerance: 50% breakfast per I/Os. Pancakes/nino/juice    LABS  Labs reviewed    MEDICATIONS  Medications reviewed  Current Facility-Administered Medications   Medication    acetaminophen (TYLENOL) solution 650 mg    calcium carbonate suspension 1,250 mg    calcium carbonate-vitamin D (CALTRATE) 600-10 MG-MCG per tablet 1 tablet    cyanocobalamin (VITAMIN B-12) tablet 500 mcg    dapsone suspension 50 mg    heparin ANTICOAGULANT injection 5,000 Units    lidocaine (LMX4) cream    lidocaine 1 % 0.1-1 mL    loperamide (IMODIUM A-D) tablet 2 mg    metoprolol (FIRST-METOPROLOL) solution 25 mg    multivitamins w/minerals " "liquid 15 mL    ondansetron (ZOFRAN ODT) ODT tab 4 mg    Or    ondansetron (ZOFRAN) injection 4 mg    pantoprazole (PROTONIX) 2 mg/mL suspension 40 mg    predniSONE (DELTASONE) tablet 5 mg    And    predniSONE (DELTASONE) tablet 2.5 mg    senna-docusate (SENOKOT-S/PERICOLACE) 8.6-50 MG per tablet 1 tablet    Or    senna-docusate (SENOKOT-S/PERICOLACE) 8.6-50 MG per tablet 2 tablet    sevelamer carbonate (RENVELA) Packet 0.8 g    sodium chloride (PF) 0.9% PF flush 3 mL    tacrolimus (GENERIC) suspension 4 mg    And    tacrolimus (GENERIC) suspension 4 mg       ANTHROPOMETRICS  Height:   Ht Readings from Last 1 Encounters:   02/07/24 1.6 m (5' 3\")     Most Recent Weight: 43.4 kg (95 lb 9.6 oz)    IBW: 52.3 kg  Body mass index is 16.93 kg/m .  BMI: Underweight BMI <18.5  Weight History: Pt has had clinically significant wt loss (30% x 1 year)  Possible EDW 45.5 kg  Wt Readings from Last 15 Encounters:   02/10/24 43.4 kg (95 lb 9.6 oz)   02/07/24 46.7 kg (102 lb 14.4 oz)   01/24/24 47.2 kg (104 lb)   01/10/24 47.2 kg (104 lb)   11/29/23 49 kg (108 lb)   11/07/23 49.4 kg (109 lb)   10/09/23 51.1 kg (112 lb 10.5 oz)   09/01/23 49.4 kg (109 lb)   08/02/23 52.2 kg (115 lb)   07/25/23 50.3 kg (111 lb)   05/23/23 57.3 kg (126 lb 4.8 oz)   03/01/23 64.4 kg (142 lb)   01/25/23 62.1 kg (136 lb 14.5 oz)   12/21/22 64.3 kg (141 lb 12.8 oz)   10/26/22 66.5 kg (146 lb 8 oz)     Dosing Weight: Actual Body Weight 43.4 kg    ASSESSED NUTRITION NEEDS  Estimated Energy Needs: 1520 - 1735 kcals/day (35 - 40 kcals/kg)  Justification: Repletion and Underweight  Estimated Protein Needs: 52 - 61 grams protein/day (1.2 - 1.4 grams of pro/kg)  Justification: Repletion  Estimated Fluid Needs: 1520 - 1735 mL/day (1 mL/kcal)   Justification: Maintenance    PHYSICAL FINDINGS  See malnutrition section below.  No abnormal nutrition-related physical findings observed.   Jose Alejandro: 18  GI: Last BM 2/11 diarrhea  Bowel regimen: PRN  GI concerns reported  " gastroparesis    MALNUTRITION  % Intake: </=50% for >/= 1 month (severe)  % Weight Loss: > 20% in 1 year (severe)  Subcutaneous Fat Loss: Global severe  Muscle Loss: Global severe  Fluid Accumulation/Edema: Moderate  Malnutrition Diagnosis: Severe malnutrition in the context of chronic disease    NUTRITION DIAGNOSIS  Inadequate oral intake related to nausea, gastroparesis as evidenced by 30% wt loss x 1 year, muscle and fat wasting      INTERVENTIONS  Implementation  Parenteral Nutrition/IV Fluids - Initiate     Goals  Total avg nutritional intake to meet a minimum of 35 kcal/kg and 1.2 g PRO/kg daily (per dosing wt 43.4 kg).     Monitoring/Evaluation  Progress toward goals will be monitored and evaluated per protocol.  Vani Galicia RDN [facility] 5B/10A ICU RD pager: [phone]   On Call Pager (weekends only): [phone]

## 2024-02-12 NOTE — PLAN OF CARE
Goal Outcome Evaluation:      Patient alert/oriented x4. Calm and cooperative.   Room air  VSS  2022 bilateral lung transplant. Admitted for failure to thrive.   Poor appetite/Gastroparesis. Patent GJ tube. 1.5 L a day fluid restriction due to ESRD- Dialysis Tues, Thurs and Sat.  Loose stools x2 days. PRN Imodium.   3+ edema in lower extremities.   Bruising on right side of face and body from fall last week.   Denies SOB, nausea and vomiting.    Xray of right hip this AM.  Continue giving all medications through J-Tube.     Nutrition consulted for initiation of TPN/Lipids.  GI also consulted.     Single Lumen CVC placement is scheduled on 2/14. Patient to be NPO night before.    Call light within reach, Able to make needs known.

## 2024-02-12 NOTE — PLAN OF CARE
Goal Outcome Evaluation:      Patient is alert and oriented x4  Independent in the room  On Room Air   Able to make needs known  C/o diarrhea. Had 2 loose stools during the sift. PRN Imodium administered.  Pleasant affect throughout the shift.                         The patient is a 30y Female complaining of

## 2024-02-12 NOTE — PROGRESS NOTES
[facility]    Medicine Progress Note - Hospitalist Service, GOLD TEAM 19    Date of Admission:  2/10/2024    Assessment & Plan   Sofie Rodriguez is a 63 year old female admitted on 2/10/2024.  She has a history of bilateral lung transplant, ESRD on HD (T/Th/Sat), gastroparesis s/p PEG/J, malnutrition, recent right femoral fracture s/p ORIF in [facility] presenting as a direct admit from home to the hospital due to concerns of failure to thrive and small bowel dysmotility.  Of note, patient recently had a mechanical fall at home about 2 days prior to this admission.    # Adult failure to thrive  # Unintentional 40 pound weight loss  # Recent fall  # Gastroparesis  # Small bowel dysmotility  # Feeding tube status  -Patient also had a mechanical fall at home about 2 days prior to this admission.   -Patient has lost about 30% of body weight for the last year  -She does report adequate p.o. intake, however, she is likely not meeting the nutritional needs at home  -She has a low risk factors including being s/p lung transplant, also has ESRD on HD.  -Documented weights this admission 95 lbs, in January 2023, weight was 136 lbs   - Physical, Occupational Therapy evaluations  - Care management consult  - Nutrition consult  -Patient will need to be initiated on TPN  -Consult to be placed to IR for tunnel cath (earliest this can happen is Wednesday 2/14/24)  -Nutrition pharmacy consult to initiate TPN  -Per conversation with nephrology, no more than 1.5 L/day fluid restriction via TPN  -GI also consulted, appreciate recs     # Hypertension  - Blood pressure appears at goal  - Continue PTA antihypertensive regimen     # End-stage renal disease on hemodialysis  - Nephrology consulted, no indication for urgent dialysis, patient to resume dialysis on Tuesday.     # Status post lung transplant  - Consult placed to transplant team.  Await recommendations  - Continue PTA  "immunosuppressive agent: Prednisone 5 mg every morning, 2.5 mg every afternoon, tacrolimus 4 mg every morning, 4 mg every afternoon  -Follow-up tacro level later this morning   -Continue PTA dapsone for PJP prophylaxis    # GERD, GI Ppx  - continue PTA Protonix    # Right hip fracture s/p ORIF (December 2023)  -Patient had right femur fracture in December 2023.  Underwent ORIF at OSH.  -Did have mechanical fall about 2 days before the current admission.  Complaining of some mild right hip pain.  -X-ray right hip obtained this admission shows no evidence of acute displaced fracture.        Diet: Regular Diet Adult    DVT Prophylaxis: Heparin SQ  Dong Catheter: Not present  Lines: PRESENT             Cardiac Monitoring: None  Code Status: Full Code      Clinically Significant Risk Factors                         # Cachexia: Estimated body mass index is 16.93 kg/m  as calculated from the following:    Height as of 2/7/24: 1.6 m (5' 3\").    Weight as of this encounter: 43.4 kg (95 lb 9.6 oz)., PRESENT ON ADMISSION            Disposition Plan  To be determined     Expected Discharge Date: 02/13/2024                    ANAY SMITH MD  Hospitalist Service, GOLD TEAM 19  [facility]  Securely message with [employer] (more info)  Text page via [employer] [facility] Paging/Directory   See signed in provider for up to date coverage information  ______________________________________________________________________    Interval History   - per nephrology no indication for dialysis until Tue  - afebrile and HDS  - reported x2 BM overnight  - having some mild R-hip pain      Physical Exam   Vital Signs: Temp: 98.6  F (37  C) Temp src: Oral BP: 121/60 Pulse: 89   Resp: 14 SpO2: 99 % O2 Device: None (Room air)    Weight: 95 lbs 9.6 oz    General Appearance: Lying in bed on room air in NAD  HEENT: PERRL: EOMI; moist mucous membrane w/o lesions  Neck: No JVD  Pulmonary: Clear to auscultation bilaterally, " no wheezes or crackles  CVS: Regular rhythm, no murmurs, rubs or gallops  GI: BS (+), soft nontender, PEG site noted, c/d/i  Extremities: No LE edema.    Skin: echymosis noted in right periorbital region, right neck, right upper extremity; left upper extremity markedly more swollen compared to RUE.  Neurologic: A&O x3      Medical Decision Making             Data

## 2024-02-12 NOTE — CONSULTS
Pulmonary Medicine  Cystic Fibrosis - Lung Transplant Team  Initial Consultation  2024    Patient: Sofie Rodriguez  MRN: [id number]  : 1960 (age 63 year old)  Transplant: 2022 (Lung), POD#[id number]  Admission date: 2/10/2024  Primary Care Provider: Vinny Berrios    Assessment & Plan:   Sofie Rodriguez is a 63 year old female with h/o bilateral lung transplant for COPD on 22 with course complicated by post-operative hemidiaphragm palsy, recurrent PNAs, positive DSA, EBV viremia, hypogammaglobulinemia, severe gastroparesis s/p G/J tube placement 22 with pyloric botox 23, GI bleed / pyloric ulcer, hemobilia s/p ERCP and MRCP, chronic diarrhea, recurrent C diff colitis, and ESRD on HD. Admitted May 2023 for FTT, supposed to be readmitted in July for FTT, but refused. Admitted to OSH - in December for right hip fracture s/p ORIF, now with significant deconditioning and ongoing severe malnutrition with gastroparesis, small bowel hypermotility and failure to tolerate any tube feeds. After extensive discussion with the dietician, GI and ultimately convincing the patient, the patient was admitted on 2/10 for initiation of TPN/lipids.     Recommendations:   - Please consult IR for port placement and replacement of feeding tube (was supposed to be OP today)  - Nutrition consult for initiation of TPN/lipds; please continue all meds through J tube  - GI consult   - DSA () pending  - Increase evening dose of tacrolimus and recheck a timed level on Thursday (ordered)  - PT/OT consults please    S/p bilateral lung transplant:   Right hemidiaphragm palsy:   Suspected CARLEE: seen in clinic last week, severely deconditioned, complicated by above. CMV  negative. ImmuKnow 108 and cfDNA 0.12 on 1/10. CT  with decreased MARLON opacities, new tree in bud RLL opacities, denies pulmonary complaints today. PFTs unchanged in clinic (but ATS not med), remain significantly below her  baseline (1.4-1.5L). Sating mid 90s on room air.   - Schedule follow up with sleep to discuss possible CPAP, given recommendations from August sleep study    Immunosuppression:  - Tacrolimus 4 mg BID.  Goal level 7-9.  Tacrolimus level today at 11 1/2 hours of 5.9. Continue 4 mg qAM and increase to 4.5 mg qPM and recheck a timed level on Thursday  - Prednisone 5 mg/2.5 mg    Prophylaxis:   - Dapsone 50 mg q MWf for PJP ppx    Positive DSA: no prior treatment for AMR, has been watched for quite some time given no pulmonary symptoms, prior PFTs stability and significant and ongoing failure to thrive. Last DSA improved to 5723, however will ongoing lower PFTs, may need to consider AMR treatment, however, unclear how she would tolerate.   - DSA (2/7) pending     EBV viremia: last level of 96K (2/7), CT c/a/p (2/7) without adenopathy.  - EBV recheck in 3 months    Severe protein calorie malnutrition:  FTT:   Gastroparesis s/p PEG/J s/p botox and G-POEM:   SB Hypomotility  Pyloric ulcer:  Chronic nausea and osmotic diarrhea:  SIBO s/p rifaximin:   Recurrent C diff colitis: chronic diarrhea since transplant with recurrent episodes of C diff. GI previously consulted, felt stools consistent with osmotic diarrhea. Over the last year has lost 40 lbs, unable to tolerate any combination of TF, most recently elemental formula. Normal fecal elastase last May. Following with Dr. Navarro who feels her main two issues are vagal injury induced gastroparesis and probably small bowel hypomotility. Her intolerance to J-tube feeds suggests the latter to be a significant issue (notes in a message that there are no motility experts at the  and he is limited in what can be offered). In agreement that TPN is likely her only option.   - RD and GI consult per above  - This is likely a long term plan, so will need port placement  - Continue imodium BID, pantoprazole     We appreciate the excellent care provided by the [facility] team.   "Recommendations communicated via this note.  Will continue to follow along closely, please do not hesitate to call with any questions or concerns.    Kaylin Triana PA-C on 2/12/2024 at 12:22 PM     Chief Complaint:     FTT    History of Present Illness:     History obtained from chart and patient:    Seen in clinic last week with ongoing inability to tolerate tube feeds, even elemental formula. Since talking to SARAH Cote, on 1/18, patient has only done 5 cartons total. Minimal oral intake and ongoing intermittent nausea and stomach cramps, which causes her to not hook up her tube feeds. Notes \"miraculously\" her diarrhea is 50% better, started the beginning of the year. She is unsure what improved her stools. Some days she won't have any stools, occasionally 1 times/day and when she has diarrhea 2-3 times/day and \"mush to liquid.\" Was taking imodium 3 yolanda/day,  now taking it 2 times/day.    Currently, denies any pulmonary complaints including cough or shortness of breath. Fell trying to fix her walker prior to admission and is very bruised across her right face.    Review of Systems:     Complete ROS negative except as noted in HPI.    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    Hepatitis 2017    Hep C, [facility]    History of blood transfusion     HTN (hypertension)     Infectious mononucleosis     Lung infection 11/30/2022    Osteopenia      Past Surgical History:   Procedure Laterality Date    BRONCHOSCOPY (RIGID OR FLEXIBLE), DIAGNOSTIC N/A 08/02/2022    Procedure: BRONCHOSCOPY, DIAGNOSTIC- inspection Bronch;  Surgeon: Kamala Lovell MD;  Location: [facility]    BRONCHOSCOPY (RIGID OR FLEXIBLE), DIAGNOSTIC N/A 09/13/2022    Procedure: INSPECTION " BRONCHOSCOPY, WITH BRONCHOALVEOLAR LAVAGE;  Surgeon: Jose R Mccullough MD;  Location: [facility]    BRONCHOSCOPY (RIGID OR FLEXIBLE), DIAGNOSTIC N/A 11/09/2022    Procedure: BRONCHOSCOPY, WITH BRONCHOALVEOLAR LAVAGE AND BIOPSY;  Surgeon: Cesar Lima MD;  Location: [facility]    BRONCHOSCOPY (RIGID OR FLEXIBLE), DIAGNOSTIC N/A 01/25/2023    Procedure: BRONCHOSCOPY, WITH BRONCHOALVEOLAR LAVAGE AND BIOPSY;  Surgeon: Mason Reddy MD;  Location: [facility]    BRONCHOSCOPY (RIGID OR FLEXIBLE), DIAGNOSTIC N/A 04/19/2023    Procedure: BRONCHOSCOPY, WITH BRONCHOALVEOLAR LAVAGE AND BIOPSY;  Surgeon: Kamala Lovell MD;  Location: [facility]    BRONCHOSCOPY (RIGID OR FLEXIBLE), DIAGNOSTIC N/A 07/12/2023    Procedure: BRONCHOSCOPY, WITH BRONCHOALVEOLAR LAVAGE AND BIOPSY;  Surgeon: Cesar Lima MD;  Location: [facility]    BRONCHOSCOPY FLEXIBLE AND RIGID N/A 07/19/2022    Procedure: BRONCHOSCOPY inspection only;  Surgeon: Bob Liao MD;  Location: [facility]    COLONOSCOPY  2015    CORONARY ANGIOGRAPHY ADULT ORDER      CV CORONARY ANGIOGRAM N/A 06/30/2021    Procedure: CV CORONARY ANGIOGRAM;  Surgeon: Alexander Cuellar MD;  Location:  [facility]    CV RIGHT HEART CATH MEASUREMENTS RECORDED N/A 06/30/2021    Procedure: CV RIGHT HEART CATH;  Surgeon: Alexander Cuellar MD;  Location:  [facility]    ENDOSCOPIC PERORAL MYOTOMY N/A 10/09/2023    Procedure: MYOTOMY, ESOPHAGUS, ENDOSCOPIC, ORAL APPROACH;  Surgeon: Gonzalez Navarro MD;  [facility]    ENDOSCOPIC RETROGRADE CHOLANGIOPANCREATOGRAM N/A 08/11/2022    Procedure: ENDOSCOPIC RETROGRADE CHOLANGIOPANCREATOGRAPHY WITH PANCREATIC DUCT NEEDLE KNIFE AND STENT PLACEMENT, BILE DUCT SPHINCTEROTOMY, BLOOD/DEBRIS REMOVAL AND STENT PLACEMENT;  Surgeon: Cosmo Arroyo MD;  [facility]    ENDOSCOPIC RETROGRADE CHOLANGIOPANCREATOGRAM N/A 10/07/2022    Procedure: ENDOSCOPIC RETROGRADE CHOLANGIOPANCREATOGRAPHY with biliary and pancreatic stent removal,  debris removal;  Surgeon: Cosmo Arroyo MD;  [facility]: [facility]    ENT SURGERY  1974    tonsillectomy    ENTEROSCOPY SMALL BOWEL N/A 08/11/2022    Procedure: SMALL BOWEL ENTEROSCOPY;  Surgeon: Cosmo Arroyo MD;  [facility]    ESOPHAGOGASTRODUODENOSCOPY, WITH NASOGASTRIC TUBE INSERTION N/A 07/01/2022    Procedure: ESOPHAGOGASTRODUODENOSCOPY, WITH NASOJEJUNAL TUBE INSERTION;  Surgeon: Ozzy Nickerson MD;  Location:  GI    ESOPHAGOSCOPY, GASTROSCOPY, DUODENOSCOPY (EGD), COMBINED N/A 08/03/2022    Procedure: ESOPHAGOGASTRODUODENOSCOPY (EGD);  Surgeon: Ira Andres MD;  Location:  GI    ESOPHAGOSCOPY, GASTROSCOPY, DUODENOSCOPY (EGD), COMBINED N/A 01/25/2023    Procedure: ESOPHAGOGASTRODUODENOSCOPY (EGD) with botox injection;  Surgeon: Gonzalez Navarro MD;  Location:  GI    ESOPHAGOSCOPY, GASTROSCOPY, DUODENOSCOPY (EGD), COMBINED N/A 10/09/2023    Procedure: ESOPHAGOGASTRODUODENOSCOPY;  Surgeon: Gonzalez Navarro MD;  Location:  OR    HAND SURGERY      INSERT CHEST TUBE Right 09/13/2022    Procedure: Insert chest tube;  Surgeon: Jose R Mccullough MD;  Location:  GI    IR CVC TUNNEL PLACEMENT > 5 YRS OF AGE  09/26/2022    IR GASTRO JEJUNOSTOMY TUBE CHANGE  08/31/2022    IR GASTRO JEJUNOSTOMY TUBE CHANGE  12/21/2022    IR GASTRO JEJUNOSTOMY TUBE CHANGE  07/12/2023    IR GASTRO JEJUNOSTOMY TUBE CHANGE  08/18/2023    IR GASTRO JEJUNOSTOMY TUBE CHANGE  11/14/2023    IR GASTRO JEJUNOSTOMY TUBE PLACEMENT  07/27/2022    IR THORACENTESIS  08/29/2022    LEEP TX, CERVICAL  04/07/2017    HECTOR III    LYMPH NODE BIOPSY Left 2005    Left axilla, benign- [place]    MIDLINE INSERTION - DOUBLE LUMEN Left 07/28/2022    20cm, Basilic vein    REPLACE GASTROJEJUNOSTOMY TUBE, PERCUTANEOUS  10/07/2022    Procedure: Replace Gastrojejunostomy Tube;  Surgeon: Cosmo Arroyo MD;  [facility]    THORACENTESIS Left 08/29/2022    Procedure: THORACENTESIS;  Surgeon: Bo Capone PA-C;   Location: [facility]    THORACENTESIS Left 09/13/2022    Procedure: Thoracentesis;  Surgeon: Jose R Mccullough MD;  [facility]: [facility]    THROMBECTOMY UPPER EXTREMITY Left 07/02/2022    Procedure: LEFT RADIAL ARM THROMBECTOMY;  Surgeon: Christie Graham MD;  [facility]    TRANSPLANT LUNG RECIPIENT SINGLE X2 Bilateral 06/28/2022    Procedure: Clamshell Incision, Bilateral Sequential Lung Transplant, On Cardiopulmonary Bypass, Flexible Bronchoscopy;  Surgeon: Sue Sunshine MD;  Location: [facility]     Social and Family History:     Social History     Socioeconomic History    Marital status:      Spouse name: Not on file    Number of children: Not on file    Years of education: Not on file    Highest education level: Not on file   Occupational History    Not on file   Tobacco Use    Smoking status: Former     Packs/day: 0.50     Years: 30.00     Additional pack years: 0.00     Total pack years: 15.00     Types: Cigarettes     Quit date: 11/11/2020     Years since quitting: 3.2    Smokeless tobacco: Never   Substance and Sexual Activity    Alcohol use: Not Currently     Comment: Stopped drinking in 2020    Drug use: Not Currently     Types: Marijuana, Methamphetamines     Comment: hx:marijuana and methamphetamine-quit both unsure ?  2-3 yrs ago    Sexual activity: Not on file   Other Topics Concern    Parent/sibling w/ CABG, MI or angioplasty before 65F 55M? Not Asked   Social History Narrative    Not on file     Social Determinants of Health     Financial Resource Strain: Not on file   Food Insecurity: Not on file   Transportation Needs: Not on file   Physical Activity: Not on file   Stress: Not on file   Social Connections: Not on file   Interpersonal Safety: Not At Risk (9/1/2023)    Humiliation, Afraid, Rape, and Kick questionnaire     Fear of Current or Ex-Partner: No     Emotionally Abused: No     Physically Abused: No     Sexually Abused: No   Housing Stability: Not on file     No family history  on file.  Allergies and Home Medications:     Allergies   Allergen Reactions    Blood Transfusion Related (Informational Only) Other (See Comments)     Patient has a history of a clinically significant antibody against RBC antigens.  A delay in compatible RBCs may occur.     No Clinical Screening - See Comments Other (See Comments)     Patient has a history of a clinically significant antibody against RBC antigens.  A delay in compatible RBCs may occur.    Azithromycin Rash     12/1/22: Developed a rash that is not raised and looks diffuse in nature. It started in the groin and up the back and has now worked its way up her chest into her face. Pt states that it has now started to itch. She is breathing and talking normally and denies any airway changes. Unclear what started rash. Pt noted feeling somewhat itchy yesterday.    Ganciclovir Rash     12/1/22: Developed a rash that is not raised and looks diffuse in nature. It started in the groin and up the back and has now worked its way up her chest into her face. Pt states that it has now started to itch. She is breathing and talking normally and denies any airway changes. Unclear what started rash. Pt noted feeling somewhat itchy yesterday.     Medications Prior to Admission   Medication Sig Dispense Refill Last Dose    acetaminophen (TYLENOL) 500 MG tablet 500-1,000 mg by Per J Tube route 3 times daily as needed for mild pain   2/10/2024    B Complex-C-Folic Acid (DIALYVITE) TABS 1 tablet by Per J Tube route every morning   2/10/2024    Calcium Carbonate-Vitamin D 600-10 MG-MCG TABS 1 tablet by Per J Tube route 3 times daily 90 tablet 11 2/10/2024    dapsone 2 mg/mL SUSP 25 mLs (50 mg) by Per J Tube route Every Mon, Wed, Fri Morning 1000 mL 11 2/9/2024    furosemide (LASIX) 40 MG tablet 40 mg by Per J Tube route 2 times daily   2/10/2024 at 0930    loperamide (IMODIUM A-D) 2 MG tablet 1 tablet (2 mg) by Per J Tube route 4 times daily as needed for diarrhea    2/10/2024    metoprolol tartrate (LOPRESSOR) 50 MG tablet 0.5 tablets (25 mg) by Per Feeding Tube route 2 times daily 60 tablet 11 2/10/2024 at 0930    multivitamin (CENTRUM SILVER) tablet Take 1 tablet by mouth daily   2/10/2024 at 0930    pantoprazole (PROTONIX) 2 mg/mL SUSP suspension 20 mLs (40 mg) by Per J Tube route 2 times daily 1200 mL 11 2/10/2024 at 0930    predniSONE (DELTASONE) 5 MG tablet Take 1 tablet (5 mg) by mouth every morning AND 0.5 tablets (2.5 mg) every evening. Take 1 tab (5mg) at AM and 1/2 tab (2.5) in pm (Patient taking differently: Take 1 tablet (5 mg) by J-tube every morning AND 0.5 tablets (2.5 mg) every evening. Take 1 tab (5mg) at AM and 1/2 tab (2.5) in pm) 135 tablet 3 2/10/2024 at 0930    protein modular (PROSOURCE TF) LIQD 1 packet by Per Feeding Tube route daily (Patient taking differently: 1 packet by Per Feeding Tube route daily at 2 pm) 30 packet 11 2/9/2024    sevelamer carbonate, RENVELA, 0.8 GM PACK Packet Take 1 packet (0.8 g) by mouth 3 times daily (with meals) (Patient taking differently: 0.8 g by Per J Tube route 3 times daily (with meals))   Past Week    tacrolimus (GENERIC) 1 mg/mL suspension Take 4 mLs (4 mg) by mouth every morning AND 4 mLs (4 mg) every evening. (Patient taking differently: Take 4 mLs (4 mg) by j-tube every morning AND 4 mLs (4 mg) every evening.)   2/10/2024 at 0930    vitamin B-12 (CYANOCOBALAMIN) 500 MCG tablet 1 tablet (500 mcg) by Per Feeding Tube route daily 90 tablet 3 2/10/2024     Current Scheduled Meds   calcium carbonate-vitamin D  1 tablet Per J Tube TID w/meals    cyanocobalamin  500 mcg Per Feeding Tube Daily    dapsone  50 mg Per J Tube Q Mon Wed Fri AM    heparin ANTICOAGULANT  5,000 Units Subcutaneous Q12H    metoprolol  25 mg Per J Tube BID    multivitamins w/minerals  15 mL Per Feeding Tube Daily    pantoprazole  40 mg Per J Tube BID    predniSONE  5 mg Oral QAM    And    predniSONE  2.5 mg Oral QPM    sevelamer carbonate (RENVELA)  " 0.8 g Oral TID w/meals    tacrolimus  4 mg Oral QAM    And    tacrolimus  4 mg Oral QPM      Current PRN Meds  acetaminophen, calcium carbonate, lidocaine 4%, lidocaine (buffered or not buffered), loperamide, ondansetron **OR** ondansetron, senna-docusate **OR** senna-docusate, sodium chloride (PF)     Physical Exam:     All notes, images, and labs from past 24 hours (at minimum) were reviewed.    Vital signs:  Temp: 98.6  F (37  C) Temp src: Oral BP: 121/60 Pulse: 89   Resp: 14 SpO2: 99 % O2 Device: None (Room air)     Weight: 43.4 kg (95 lb 9.6 oz)  I/O: No intake or output data in the 24 hours ending 02/12/24 1222  Constitutional: Lying in bed eating some lunch, in no apparent distress  HEENT: Eyes with pink conjunctivae, anicteric  PULM: Moderate airflow, scattered basilar crackles  CV: Normal S1 and S2.  RRR. + murmur  ABD: NABS, soft, non tender   MSK: Moves all extremities.  No apparent muscle wasting  NEURO: Alert and conversant  SKIN: Warm, dry.  No rash on limited exam  PSYCH: Mood stable    Results:     LABS    CMP:   Recent Labs   Lab 02/12/24  0756 02/11/24  0852 02/07/24  1338     --  141   POTASSIUM 4.0 3.8 3.6   CHLORIDE 107  --  106   CO2 26  --  26   ANIONGAP 11  --  9   *  --  145*   BUN 27.1*  --  22.9   CR 4.39*  --  3.89*   GFRESTIMATED 11*  --  12*   ESTUARDO 8.9  --  8.7*   MAG 2.4* 2.2  --    PHOS 3.5 2.9  --    PROTTOTAL 5.9*  --  5.8*   ALBUMIN 3.6  --  3.5   BILITOTAL 0.3  --  0.3   ALKPHOS 67  --  67   AST 17  --  16   ALT 7  --  7     CBC:   Recent Labs   Lab 02/12/24  0756 02/07/24  1338   WBC 5.3 4.6   RBC 2.56* 2.47*   HGB 9.2* 8.9*   HCT 30.9* 29.6*   * 120*   MCH 35.9* 36.0*   MCHC 29.8* 30.1*   RDW 15.4* 14.3    165       INR: No lab results found in last 7 days.    Glucose:   Recent Labs   Lab 02/12/24  0756 02/07/24  1338   * 145*       Blood Gas: No lab results found in last 7 days.    Culture Data No results for input(s): \"CULT\" in the last 168 " hours.    Virology Data:   Lab Results   Component Value Date    FLUAH1 Invalid (Invalid) 07/12/2023    FLUAH3 Invalid (Invalid) 07/12/2023    RA1763 Invalid (Invalid) 07/12/2023    IFLUB Invalid (Invalid) 07/12/2023    RSVA Invalid (Invalid) 07/12/2023    RSVB Invalid (Invalid) 07/12/2023    PIV1 Invalid (Invalid) 07/12/2023    PIV2 Invalid (Invalid) 07/12/2023    PIV3 Invalid (Invalid) 07/12/2023    HMPV Invalid (Invalid) 07/12/2023       Historical CMV results (last 3 of prior testing):  Lab Results   Component Value Date    CMVQNT Not Detected 02/07/2024    CMVQNT Not Detected 01/10/2024    CMVQNT Not Detected 07/25/2023     Lab Results   Component Value Date    CMVLOG 3.2 07/12/2023    CMVLOG <2.1 04/19/2023    CMVLOG 3.5 01/25/2023       Urine Studies    Recent Labs   Lab Test 05/18/23  0627 09/19/22  2254   URINEPH 5.0 7.0   NITRITE Negative Negative   LEUKEST Moderate* Large*   WBCU 21* 29*       Most Recent [place] Pulmonary Function Testing (FVC/FEV1 only)  FVC-Pre   Date Value Ref Range Status   02/07/2024 1.19 L    01/10/2024 1.12 L    08/29/2023 1.48 L    07/25/2023 1.55 L      FVC-%Pred-Pre   Date Value Ref Range Status   02/07/2024 42 %    01/10/2024 39 %    08/29/2023 53 %    07/25/2023 55 %      FEV1-Pre   Date Value Ref Range Status   02/07/2024 1.13 L    01/10/2024 1.10 L    08/29/2023 1.43 L    07/25/2023 1.54 L      FEV1-%Pred-Pre   Date Value Ref Range Status   02/07/2024 51 %    01/10/2024 49 %    08/29/2023 64 %    07/25/2023 69 %

## 2024-02-12 NOTE — CONSULTS
"    Interventional Radiology  [facility] Consult Note  02/12/24   2:37 PM    Consult Requested: \"Chest port placement for long term TPN\"    Recommendations/Plan:  -Patient is on IR schedule Wednesday 2/14/24 for a tunneled, single-lumen CVC placement for TPN.   -Labs WNL for procedure: plts 216, INR x.  -Orders entered for procedure. NPO required.  -Medications to be held include: none.  -Consent will be done prior to procedure.     Case and imaging discussed with IR attending, Dr Andrade. Recommendations were reviewed with requesting provider, Dr. Rod.      This is a 63 year old female with past medical history of ESRD and longstanding h/o of gastroparesis (has PEG) who is not tolerating PO intake with severe malnutrition in need of a TCVC for TPN.     Expected date of discharge:  TBD    Vitals:   /60 (BP Location: Right arm, Patient Position: Semi-Schuster's, Cuff Size: Adult Small)   Pulse 89   Temp 98.6  F (37  C) (Oral)   Resp 14   Wt 43.4 kg (95 lb 9.6 oz)   SpO2 99%   BMI 16.93 kg/m      Pertinent Labs:   Lab Results   Component Value Date    WBC 5.3 02/12/2024    WBC 4.6 02/07/2024    WBC 5.4 01/10/2024    WBC 5.8 06/30/2021    WBC 5.2 06/14/2021     Lab Results   Component Value Date    HGB 9.2 02/12/2024    HGB 8.9 02/07/2024    HGB 9.8 01/10/2024    HGB 11.8 06/30/2021    HGB 12.9 06/30/2021    HGB 12.5 06/14/2021     Lab Results   Component Value Date     02/12/2024     02/07/2024     01/10/2024     06/30/2021     06/14/2021     Lab Results   Component Value Date    INR 0.92 07/11/2023    INR 0.9 05/12/2023    INR 0.93 06/14/2021    PTT 25 08/12/2022    PTT 28 06/14/2021     Lab Results   Component Value Date    POTASSIUM 4.0 02/12/2024    POTASSIUM 3.6 08/29/2022    POTASSIUM 5.2 07/06/2022    POTASSIUM 4.4 06/30/2021        COVID-19 Antibody Results, Testing for Immunity          4/29/2022    11:46   COVID-19 Antibody Results, Testing for Immunity "   SARS-CoV-2 Nucleocapsid Total Ab, S Negative      COVID-19 PCR Results          7/29/2022    14:49 8/5/2022    15:32 9/9/2022    18:11 9/21/2022    14:42 9/29/2022    09:50 11/30/2022    10:08 12/16/2022    08:02 3/6/2023    23:04 5/17/2023    17:01   COVID-19 PCR Results   COVID-19 Virus PCR to [place] - Result       Undetected         COVID-19 Virus by PCR (External Result)      Negative     Undetected     Negative        SARS CoV2 PCR Negative  Negative  Negative  Negative  Negative     Negative          2/11/2024    17:53   COVID-19 PCR Results   COVID-19 Virus PCR to [place] - Result    COVID-19 Virus by PCR (External Result)    SARS CoV2 PCR Negative       Details          This result is from an external source.               Lynnette Kelly PA-C  Interventional Radiology

## 2024-02-12 NOTE — PLAN OF CARE
[phone]    Pt is Aox4. Up SBA to bathroom. Has occasional incontinence due to urgency of diarrhea. Poor appetite. Had cheerios and toast before bed. Multiple wounds to R side with dressings CDI. Scattered bruising along R side. Denies pain, n/v, n/t, sob. Refused midnight heparin Sub Q. Order changed to q12 hr at pt request. PEG in place. All meds through J tube. Pt has outstanding consults for nephrology and nutrition. HD schedule T/TH/SAT. Sleeping between cares. Continue POC.

## 2024-02-12 NOTE — PROGRESS NOTES
"  Nephrology Progress Note  02/12/2024         Assessment & Recommendations:   Sofie Rodriguez is a 63 year old year old female    64yo F with ESKD, lung transplant in 6/28/22, and gastroparesis admitted with failure to thrive and unintentional weight loss. She also reports LE edema and L arm edema at the elbow. She reports feeling \"sick\" whenever she has tube feeds or oral food intake. She has nausea but no vomiting. Neph is consulted for HD.     # ESKD - TTS, LUE AVG, 3hr, [facility]. She has been losing weight and now even below her recent set EDW.  - Will do HD tmr for 3 hrs and plan 1-2 L remoal  - Please do Renal panel on TTS  # FTT  # Chronic diarrhea  Working-up currently. Not on any cellcept. Team is planning TPN.  - Please limit fluid < 1.5 L per day for TPN  #Hypertension - BP on low side. Noted Furosemide at home but she does not urinate so ok to discontinue.   On metoprolol solution 25 mg twice a day  # Anemia 2/2 ESKD  On Venofer 50 qwk, Mircera last dose 1/9. Hb 8-9.  - Will continue Venofer 50 mcg q week (Tuesday)  - Will start Epogen 4000 U with run while she is admitted  # BMD   Phos are normal to low.   - On home sevelamer 800 mg TID.   - Please Reduce Sevelamer to 800 mg BID 2/12/24-> I do for you    Recommendations were communicated to primary team via  note and message    Ravin Johnston MD   Division of Renal Disease and Hypertension  [employer]  Vocera Web Console    Interval History :   Nursing and provider notes from last 24 hours reviewed.  In the last 24 hours Sofie Rodriguez has no acute event. She is doing ok.  She reports ongoing diarrhea since lung Tx. she also has ongoing weight loss for 40 Lb since last year. Appetite is good otherwise. She has LE edema. No pain or fever.     Review of Systems:   I reviewed the following systems:  10 system are negative except as mentioned above    Physical Exam:   No intake/output data recorded.   /60 (BP Location: " Right arm, Patient Position: Semi-Schuster's, Cuff Size: Adult Small)   Pulse 89   Temp 98.6  F (37  C) (Oral)   Resp 14   Wt 43.4 kg (95 lb 9.6 oz)   SpO2 99%   BMI 16.93 kg/m       GENERAL APPEARANCE: Alert, NAD, pleasant  EYES:  No scleral icterus, pupils equal  PULM: lungs clear to auscultation bilaterally, equal air movement, no clubbing  CV: regular rhythm, normal rate, no rub     -edema Trace to 1+ edema   GI: soft, non tender, no distended, bowel sounds are present  INTEGUMENT: no cyanosis, no rash  NEURO:  no asterixis   Access Left AVG: + Bruit and thrill    Labs:   All labs reviewed by me  Electrolytes/Renal -   Recent Labs   Lab Test 02/12/24 0756 02/11/24  0852 02/07/24 1338 02/01/24  1113 01/19/24  0846 01/10/24  0948 07/14/23  0818 07/11/23  1217     --  141  --   --  137   < > 140   POTASSIUM 4.0 3.8 3.6  --   --  4.6   < > 4.4   CHLORIDE 107  --  106 105   < > 98   < > 101   CO2 26  --  26  --   --  28   < > 29   BUN 27.1*  --  22.9  --   --  27.6*   < > 10.2   CR 4.39*  --  3.89*  --   --  3.76*   < > 3.40*   *  --  145*  --   --  79   < > 104*   ESTUARDO 8.9  --  8.7*  --   --  9.4   < > 9.4   MAG 2.4* 2.2  --   --   --  2.3   < > 2.0   PHOS 3.5 2.9  --   --   --   --   --  2.1*    < > = values in this interval not displayed.       CBC -   Recent Labs   Lab Test 02/12/24  0756 02/07/24  1338 01/10/24  0948   WBC 5.3 4.6 5.4   HGB 9.2* 8.9* 9.8*    165 226       LFTs -   Recent Labs   Lab Test 02/12/24 0756 02/07/24 1338 07/11/23  1217   ALKPHOS 67 67 65   BILITOTAL 0.3 0.3 0.6   ALT 7 7 7   AST 17 16 23   PROTTOTAL 5.9* 5.8* 7.1   ALBUMIN 3.6 3.5 4.2       Iron Panel -   Recent Labs   Lab Test 09/26/22  0555 09/03/22  1039 08/24/22  0810   IRON 54 21* 41   IRONSAT 22 9* 21   CARLOS 769* 343* 334*         Imaging:  All imaging studies reviewed by me.     Current Medications:   calcium carbonate-vitamin D  1 tablet Per J Tube TID w/meals    cyanocobalamin  500 mcg Per Feeding  Tube Daily    dapsone  50 mg Per J Tube Q Mon Wed Fri AM    heparin ANTICOAGULANT  5,000 Units Subcutaneous Q12H    metoprolol  25 mg Per J Tube BID    multivitamins w/minerals  15 mL Per Feeding Tube Daily    pantoprazole  40 mg Per J Tube BID    predniSONE  5 mg Oral QAM    And    predniSONE  2.5 mg Oral QPM    sevelamer carbonate (RENVELA)  0.8 g Oral TID w/meals    tacrolimus  4 mg Oral QAM    And    tacrolimus  4 mg Oral QPM       Ravin Johnston MD

## 2024-02-13 ENCOUNTER — APPOINTMENT (OUTPATIENT)
Dept: PHYSICAL THERAPY | Facility: CLINIC | Age: 64
DRG: 640 | End: 2024-02-13
Attending: INTERNAL MEDICINE
Payer: MEDICARE

## 2024-02-13 LAB
ALBUMIN SERPL BCG-MCNC: 3.1 G/DL (ref 3.5–5.2)
ALP SERPL-CCNC: 56 U/L (ref 40–150)
ALT SERPL W P-5'-P-CCNC: 8 U/L (ref 0–50)
ANION GAP SERPL CALCULATED.3IONS-SCNC: 13 MMOL/L (ref 7–15)
AST SERPL W P-5'-P-CCNC: 31 U/L (ref 0–45)
BASOPHILS # BLD AUTO: ABNORMAL 10*3/UL
BASOPHILS # BLD MANUAL: 0 10E3/UL (ref 0–0.2)
BASOPHILS NFR BLD AUTO: ABNORMAL %
BASOPHILS NFR BLD MANUAL: 0 %
BILIRUB DIRECT SERPL-MCNC: <0.2 MG/DL (ref 0–0.3)
BILIRUB SERPL-MCNC: 0.2 MG/DL
BUN SERPL-MCNC: 33.4 MG/DL (ref 8–23)
CALCIUM SERPL-MCNC: 8.4 MG/DL (ref 8.8–10.2)
CHLORIDE SERPL-SCNC: 104 MMOL/L (ref 98–107)
CREAT SERPL-MCNC: 5.41 MG/DL (ref 0.51–0.95)
DACRYOCYTES BLD QL SMEAR: SLIGHT
DEPRECATED HCO3 PLAS-SCNC: 23 MMOL/L (ref 22–29)
EGFRCR SERPLBLD CKD-EPI 2021: 8 ML/MIN/1.73M2
EOSINOPHIL # BLD AUTO: ABNORMAL 10*3/UL
EOSINOPHIL # BLD MANUAL: 0.1 10E3/UL (ref 0–0.7)
EOSINOPHIL NFR BLD AUTO: ABNORMAL %
EOSINOPHIL NFR BLD MANUAL: 3 %
ERYTHROCYTE [DISTWIDTH] IN BLOOD BY AUTOMATED COUNT: 15.8 % (ref 10–15)
GLUCOSE BLDC GLUCOMTR-MCNC: 117 MG/DL (ref 70–99)
GLUCOSE BLDC GLUCOMTR-MCNC: 129 MG/DL (ref 70–99)
GLUCOSE BLDC GLUCOMTR-MCNC: 139 MG/DL (ref 70–99)
GLUCOSE BLDC GLUCOMTR-MCNC: 149 MG/DL (ref 70–99)
GLUCOSE BLDC GLUCOMTR-MCNC: 94 MG/DL (ref 70–99)
GLUCOSE SERPL-MCNC: 100 MG/DL (ref 70–99)
HCT VFR BLD AUTO: 28.1 % (ref 35–47)
HGB BLD-MCNC: 9.1 G/DL (ref 11.7–15.7)
IMM GRANULOCYTES # BLD: ABNORMAL 10*3/UL
IMM GRANULOCYTES NFR BLD: ABNORMAL %
INR PPP: 0.92 (ref 0.85–1.15)
INR PPP: 0.97 (ref 0.85–1.15)
LYMPHOCYTES # BLD AUTO: ABNORMAL 10*3/UL
LYMPHOCYTES # BLD MANUAL: 0.8 10E3/UL (ref 0.8–5.3)
LYMPHOCYTES NFR BLD AUTO: ABNORMAL %
LYMPHOCYTES NFR BLD MANUAL: 17 %
MAGNESIUM SERPL-MCNC: 2.4 MG/DL (ref 1.7–2.3)
MCH RBC QN AUTO: 40.1 PG (ref 26.5–33)
MCHC RBC AUTO-ENTMCNC: 32.4 G/DL (ref 31.5–36.5)
MCV RBC AUTO: 124 FL (ref 78–100)
MONOCYTES # BLD AUTO: ABNORMAL 10*3/UL
MONOCYTES # BLD MANUAL: 0.7 10E3/UL (ref 0–1.3)
MONOCYTES NFR BLD AUTO: ABNORMAL %
MONOCYTES NFR BLD MANUAL: 16 %
NEUTROPHILS # BLD AUTO: ABNORMAL 10*3/UL
NEUTROPHILS # BLD MANUAL: 2.9 10E3/UL (ref 1.6–8.3)
NEUTROPHILS NFR BLD AUTO: ABNORMAL %
NEUTROPHILS NFR BLD MANUAL: 64 %
NRBC # BLD AUTO: 0 10E3/UL
NRBC # BLD AUTO: 0.1 10E3/UL
NRBC BLD AUTO-RTO: 0 /100
NRBC BLD MANUAL-RTO: 2 %
PHOSPHATE SERPL-MCNC: 4.7 MG/DL (ref 2.5–4.5)
PLAT MORPH BLD: ABNORMAL
PLATELET # BLD AUTO: 191 10E3/UL (ref 150–450)
POLYCHROMASIA BLD QL SMEAR: SLIGHT
POTASSIUM SERPL-SCNC: 5 MMOL/L (ref 3.4–5.3)
PREALB SERPL-MCNC: 22.7 MG/DL (ref 20–40)
PROT SERPL-MCNC: 5.5 G/DL (ref 6.4–8.3)
RBC # BLD AUTO: 2.27 10E6/UL (ref 3.8–5.2)
RBC MORPH BLD: ABNORMAL
SODIUM SERPL-SCNC: 140 MMOL/L (ref 135–145)
WBC # BLD AUTO: 4.5 10E3/UL (ref 4–11)

## 2024-02-13 PROCEDURE — 250N000013 HC RX MED GY IP 250 OP 250 PS 637: Performed by: INTERNAL MEDICINE

## 2024-02-13 PROCEDURE — 634N000001 HC RX 634: Mod: JZ | Performed by: INTERNAL MEDICINE

## 2024-02-13 PROCEDURE — 85007 BL SMEAR W/DIFF WBC COUNT: CPT | Performed by: INTERNAL MEDICINE

## 2024-02-13 PROCEDURE — 250N000011 HC RX IP 250 OP 636: Performed by: INTERNAL MEDICINE

## 2024-02-13 PROCEDURE — 99233 SBSQ HOSP IP/OBS HIGH 50: CPT | Performed by: INTERNAL MEDICINE

## 2024-02-13 PROCEDURE — 97530 THERAPEUTIC ACTIVITIES: CPT | Mod: GP

## 2024-02-13 PROCEDURE — 85027 COMPLETE CBC AUTOMATED: CPT | Performed by: INTERNAL MEDICINE

## 2024-02-13 PROCEDURE — 250N000009 HC RX 250: Performed by: INTERNAL MEDICINE

## 2024-02-13 PROCEDURE — 90935 HEMODIALYSIS ONE EVALUATION: CPT

## 2024-02-13 PROCEDURE — 120N000002 HC R&B MED SURG/OB UMMC

## 2024-02-13 PROCEDURE — 80053 COMPREHEN METABOLIC PANEL: CPT | Performed by: INTERNAL MEDICINE

## 2024-02-13 PROCEDURE — 36415 COLL VENOUS BLD VENIPUNCTURE: CPT | Performed by: INTERNAL MEDICINE

## 2024-02-13 PROCEDURE — 83735 ASSAY OF MAGNESIUM: CPT | Performed by: INTERNAL MEDICINE

## 2024-02-13 PROCEDURE — 82248 BILIRUBIN DIRECT: CPT | Performed by: INTERNAL MEDICINE

## 2024-02-13 PROCEDURE — B4185 PARENTERAL SOL 10 GM LIPIDS: HCPCS | Mod: JZ | Performed by: INTERNAL MEDICINE

## 2024-02-13 PROCEDURE — 36415 COLL VENOUS BLD VENIPUNCTURE: CPT

## 2024-02-13 PROCEDURE — 250N000012 HC RX MED GY IP 250 OP 636 PS 637: Performed by: INTERNAL MEDICINE

## 2024-02-13 PROCEDURE — 85610 PROTHROMBIN TIME: CPT

## 2024-02-13 PROCEDURE — 99233 SBSQ HOSP IP/OBS HIGH 50: CPT | Performed by: STUDENT IN AN ORGANIZED HEALTH CARE EDUCATION/TRAINING PROGRAM

## 2024-02-13 PROCEDURE — 250N000009 HC RX 250: Mod: JZ | Performed by: INTERNAL MEDICINE

## 2024-02-13 PROCEDURE — 84134 ASSAY OF PREALBUMIN: CPT | Performed by: INTERNAL MEDICINE

## 2024-02-13 PROCEDURE — 5A1D70Z PERFORMANCE OF URINARY FILTRATION, INTERMITTENT, LESS THAN 6 HOURS PER DAY: ICD-10-PCS | Performed by: INTERNAL MEDICINE

## 2024-02-13 PROCEDURE — 85610 PROTHROMBIN TIME: CPT | Performed by: INTERNAL MEDICINE

## 2024-02-13 PROCEDURE — 97110 THERAPEUTIC EXERCISES: CPT | Mod: GP

## 2024-02-13 PROCEDURE — 258N000003 HC RX IP 258 OP 636: Performed by: INTERNAL MEDICINE

## 2024-02-13 PROCEDURE — 84100 ASSAY OF PHOSPHORUS: CPT | Performed by: INTERNAL MEDICINE

## 2024-02-13 RX ORDER — CEFAZOLIN SODIUM 2 G/100ML
2 INJECTION, SOLUTION INTRAVENOUS
Status: DISCONTINUED | OUTPATIENT
Start: 2024-02-13 | End: 2024-02-14

## 2024-02-13 RX ADMIN — Medication: at 09:06

## 2024-02-13 RX ADMIN — CALCIUM CARBONATE 600 MG (1,500 MG)-VITAMIN D3 400 UNIT TABLET 1 TABLET: at 12:42

## 2024-02-13 RX ADMIN — OLIVE OIL AND SOYBEAN OIL 250 ML: 16; 4 INJECTION, EMULSION INTRAVENOUS at 22:12

## 2024-02-13 RX ADMIN — SEVELAMER CARBONATE 0.8 G: 800 POWDER, FOR SUSPENSION ORAL at 08:23

## 2024-02-13 RX ADMIN — Medication 15 ML: at 08:23

## 2024-02-13 RX ADMIN — IRON SUCROSE 50 MG: 20 INJECTION, SOLUTION INTRAVENOUS at 11:16

## 2024-02-13 RX ADMIN — ACETAMINOPHEN 650 MG: 325 SOLUTION ORAL at 11:43

## 2024-02-13 RX ADMIN — CYANOCOBALAMIN TAB 500 MCG 500 MCG: 500 TAB at 08:23

## 2024-02-13 RX ADMIN — ACETAMINOPHEN 650 MG: 325 SOLUTION ORAL at 18:21

## 2024-02-13 RX ADMIN — Medication 40 MG: at 08:23

## 2024-02-13 RX ADMIN — Medication 40 MG: at 20:32

## 2024-02-13 RX ADMIN — PREDNISONE 5 MG: 5 TABLET ORAL at 08:23

## 2024-02-13 RX ADMIN — SODIUM CHLORIDE 250 ML: 9 INJECTION, SOLUTION INTRAVENOUS at 09:05

## 2024-02-13 RX ADMIN — HEPARIN SODIUM 5000 UNITS: 5000 INJECTION, SOLUTION INTRAVENOUS; SUBCUTANEOUS at 00:11

## 2024-02-13 RX ADMIN — HEPARIN SODIUM 5000 UNITS: 5000 INJECTION, SOLUTION INTRAVENOUS; SUBCUTANEOUS at 12:42

## 2024-02-13 RX ADMIN — SODIUM CHLORIDE 300 ML: 9 INJECTION, SOLUTION INTRAVENOUS at 09:05

## 2024-02-13 RX ADMIN — PREDNISONE 2.5 MG: 2.5 TABLET ORAL at 20:28

## 2024-02-13 RX ADMIN — TACROLIMUS 4 MG: 5 CAPSULE ORAL at 08:23

## 2024-02-13 RX ADMIN — ASCORBIC ACID, VITAMIN A PALMITATE, CHOLECALCIFEROL, THIAMINE HYDROCHLORIDE, RIBOFLAVIN-5 PHOSPHATE SODIUM, PYRIDOXINE HYDROCHLORIDE, NIACINAMIDE, DEXPANTHENOL, ALPHA-TOCOPHEROL ACETATE, VITAMIN K1, FOLIC ACID, BIOTIN, CYANOCOBALAMIN: 200; 3300; 200; 6; 3.6; 6; 40; 15; 10; 150; 600; 60; 5 INJECTION, SOLUTION INTRAVENOUS at 22:11

## 2024-02-13 RX ADMIN — ONDANSETRON 4 MG: 4 TABLET, ORALLY DISINTEGRATING ORAL at 08:25

## 2024-02-13 RX ADMIN — CALCIUM CARBONATE 600 MG (1,500 MG)-VITAMIN D3 400 UNIT TABLET 1 TABLET: at 08:23

## 2024-02-13 RX ADMIN — SEVELAMER CARBONATE 0.8 G: 800 POWDER, FOR SUSPENSION ORAL at 20:31

## 2024-02-13 RX ADMIN — CALCIUM CARBONATE 600 MG (1,500 MG)-VITAMIN D3 400 UNIT TABLET 1 TABLET: at 18:15

## 2024-02-13 RX ADMIN — EPOETIN ALFA-EPBX 4000 UNITS: 10000 INJECTION, SOLUTION INTRAVENOUS; SUBCUTANEOUS at 09:47

## 2024-02-13 RX ADMIN — METOPROLOL TARTRATE 25 MG: 100 TABLET, FILM COATED ORAL at 20:28

## 2024-02-13 RX ADMIN — LIDOCAINE: 40 CREAM TOPICAL at 08:11

## 2024-02-13 RX ADMIN — TACROLIMUS 4.5 MG: 5 CAPSULE ORAL at 18:15

## 2024-02-13 ASSESSMENT — ACTIVITIES OF DAILY LIVING (ADL)
ADLS_ACUITY_SCORE: 27
DEPENDENT_IADLS:: INDEPENDENT
ADLS_ACUITY_SCORE: 27

## 2024-02-13 NOTE — PLAN OF CARE
Goal Outcome Evaluation:         Overall Patient Progress: improvingOverall Patient Progress: improving    Up independent in room. Lipids stopped this morning plan to restart tonight.     Dialysis today - 1L fluid removed.    Denies SOB and CP.     Complaining of tenderness on coccyx, preventative mepilex placed for protection. Bruising present on right side of face and neck - pt states she slipped out of four wheeled walker while sitting on it and adjusting breaks.     Plan: NPO at 0000 for port placement tomorrow

## 2024-02-13 NOTE — PLAN OF CARE
Problem: Adult Inpatient Plan of Care  Goal: Plan of Care Review  Outcome: Progressing  Flowsheets (Taken 2/12/2024 2345)  Outcome Evaluation: Pt ate most of lunch and dinner. Pt gets up ind to bathroom, complains of diarrhea, PRN immodium administered. Pt otherwise declines pain. R PIV placed, for Lipid infusion initiation. Continue POC.  Plan of Care Reviewed With: patient  Overall Patient Progress: no change  Goal: Patient-Specific Goal (Individualized)  Outcome: Progressing  Flowsheets (Taken 2/12/2024 2345)  Individualized Care Needs: Pt likes all meds to go through J tube.  Goal: Absence of Hospital-Acquired Illness or Injury  Outcome: Progressing  Intervention: Identify and Manage Fall Risk  Recent Flowsheet Documentation  Taken 2/12/2024 2200 by Jo Coker RN  Safety Promotion/Fall Prevention:   activity supervised   assistive device/personal items within reach   clutter free environment maintained   increased rounding and observation   lighting adjusted   nonskid shoes/slippers when out of bed   patient and family education   room near nurse's station   safety round/check completed   supervised activity  Intervention: Prevent Skin Injury  Recent Flowsheet Documentation  Taken 2/12/2024 2200 by Jo Coker RN  Body Position: position changed independently  Intervention: Prevent and Manage VTE (Venous Thromboembolism) Risk  Recent Flowsheet Documentation  Taken 2/12/2024 2200 by Jo Coker RN  VTE Prevention/Management: SCDs (sequential compression devices) off  Intervention: Prevent Infection  Recent Flowsheet Documentation  Taken 2/12/2024 2200 by Jo Coker RN  Infection Prevention:   rest/sleep promoted   single patient room provided  Goal: Optimal Comfort and Wellbeing  Outcome: Progressing  Goal: Readiness for Transition of Care  Outcome: Progressing     Problem: Fall Injury Risk  Goal: Absence of Fall and Fall-Related Injury  Outcome: Progressing  Intervention: Identify and  Manage Contributors  Recent Flowsheet Documentation  Taken 2/12/2024 2345 by Jo Coker RN  Medication Review/Management: (PRN imodium administered) medications reviewed  Taken 2/12/2024 2200 by Jo Coker RN  Medication Review/Management: medications reviewed  Intervention: Promote Injury-Free Environment  Recent Flowsheet Documentation  Taken 2/12/2024 2200 by Jo Coker RN  Safety Promotion/Fall Prevention:   activity supervised   assistive device/personal items within reach   clutter free environment maintained   increased rounding and observation   lighting adjusted   nonskid shoes/slippers when out of bed   patient and family education   room near nurse's station   safety round/check completed   supervised activity     Problem: Malnutrition  Goal: Improved Nutritional Intake  Outcome: Progressing  Intervention: Optimize Nutrition Delivery  Flowsheets (Taken 2/12/2024 2345)  Nutrition Support Management: (Lipids initiated) parenteral nutrition initiated     Problem: Diarrhea  Goal: Effective Diarrhea Management  Outcome: Not Progressing  Intervention: Manage Diarrhea  Flowsheets  Taken 2/12/2024 2345  Fluid/Electrolyte Management: fluids provided  Medication Review/Management: (PRN imodium administered) medications reviewed  Taken 2/12/2024 2200  Medication Review/Management: medications reviewed  Perineal Care: perineal hygiene encouraged     Problem: Pain Acute  Goal: Optimal Pain Control and Function  Outcome: Progressing  Intervention: Prevent or Manage Pain  Recent Flowsheet Documentation  Taken 2/12/2024 2345 by Jo Coker RN  Medication Review/Management: (PRN imodium administered) medications reviewed  Taken 2/12/2024 2200 by Jo Coker RN  Medication Review/Management: medications reviewed   Goal Outcome Evaluation:      Plan of Care Reviewed With: patient    Overall Patient Progress: no change    Outcome Evaluation: Pt ate most of lunch and dinner. Pt gets up ind to  bathroom, complains of diarrhea, PRN immodium administered. Pt otherwise declines pain. R PIV placed, for Lipid infusion initiation. Continue POC.

## 2024-02-13 NOTE — CONSULTS
Interventional Radiology  [facility] Consult Service Note  02/13/24   11:30 AM    Consult Requested: GJ tube exchange.    Recommendations/Plan:    No change to scheduled tunneled CVC placement on 2/14/24.    This is a 63 year old female with history of malnutrition, with existing 18 Fr. GJ tube last exchanged at OSH on 11/14/23. Patient is on IR schedule 2/14/24 for tunneled CVC placement for TPN. Patient's team requesting additional GJ exchange procedure. GJ is reported to be well-functioning. GJ is well-positioned on recent CT (2/7/24). GJ exchange is not indicated, as this enteric feeding tube would require routine exchange every 9-12 months (8/2024 - 11/2024).      Recommendations were reviewed with requesting team (Dr. Lopez).        Pertinent Imaging Reviewed: CT, 2/7/24.    Expected date of discharge:  TBD.    Vitals:   /66   Pulse 87   Temp 97.6  F (36.4  C) (Oral)   Resp (!) 39   Wt 43.4 kg (95 lb 9.6 oz)   SpO2 92%   BMI 16.93 kg/m      Pertinent Labs:   Lab Results   Component Value Date    WBC 4.5 02/13/2024    WBC 5.3 02/12/2024    WBC 4.6 02/07/2024    WBC 5.8 06/30/2021    WBC 5.2 06/14/2021     Lab Results   Component Value Date    HGB 9.1 02/13/2024    HGB 9.2 02/12/2024    HGB 8.9 02/07/2024    HGB 11.8 06/30/2021    HGB 12.9 06/30/2021    HGB 12.5 06/14/2021     Lab Results   Component Value Date     02/13/2024     02/12/2024     02/07/2024     06/30/2021     06/14/2021     Lab Results   Component Value Date    INR 0.97 02/13/2024    INR 0.9 05/12/2023    INR 0.93 06/14/2021    PTT 25 08/12/2022    PTT 28 06/14/2021     Lab Results   Component Value Date    POTASSIUM 5.0 02/13/2024    POTASSIUM 3.6 08/29/2022    POTASSIUM 5.2 07/06/2022    POTASSIUM 4.4 06/30/2021        COVID-19 Antibody Results, Testing for Immunity        4/29/2022    11:46   COVID-19 Antibody Results, Testing for Immunity   SARS-CoV-2 Nucleocapsid Total Ab, S  Negative    COVID-19 PCR Results        7/29/2022    14:49 8/5/2022    15:32 9/9/2022    18:11 9/21/2022    14:42 9/29/2022    09:50 11/30/2022    10:08 12/16/2022    08:02 3/6/2023    23:04 5/17/2023    17:01   COVID-19 PCR Results   COVID-19 Virus PCR to [facility] - Result       Undetected         COVID-19 Virus by PCR (External Result)      Negative     Undetected     Negative        SARS CoV2 PCR Negative  Negative  Negative  Negative  Negative     Negative            2/11/2024    17:53   COVID-19 PCR Results   COVID-19 Virus PCR to [place] - Result    COVID-19 Virus by PCR (External Result)    SARS CoV2 PCR Negative     Details          This result is from an external source.             Catalino Carr PA-C  Interventional Radiology  Pager: [phone]

## 2024-02-13 NOTE — PLAN OF CARE
Goal Outcome Evaluation:      Plan of Care Reviewed With: patient          Outcome Evaluation: WIll discharge to home with TPN when line placed

## 2024-02-13 NOTE — PROGRESS NOTES
CLINICAL NUTRITION SERVICES - BRIEF NOTE     Nutrition Prescription    RECOMMENDATIONS FOR MDs/PROVIDERS TO ORDER:  None    Malnutrition Status:    Severe malnutrition in context of chronic disease     Recommendations already ordered by Registered Dietitian (RD):  Recs for peripheral parenteral nutrition:  --Use dosing weight 43.4 kg  --initiating peripheral PN: Clinimix E (4.25% AA/5% Dex) @ 42 mL/hr + 250 mL 20% IV lipids daily = 50 g/L Dex (170 kcal, GIR 0.8 mg/kg/min) + 42.5 g/2 L AA (170 kcal, 1 g pro/kg) --> 840 kcal daily   --Osmolarity: 815 mOsmol/L    Future/Additional Recommendations:  Monitor labs, weight trends, intakes PN tolerance       Findings  Central line will not be placed until 2/14. Initiating PPN.   Body mass index is 16.93 kg/m .    Wt Readings from Last 10 Encounters:   02/10/24 43.4 kg (95 lb 9.6 oz)   02/07/24 46.7 kg (102 lb 14.4 oz)   01/24/24 47.2 kg (104 lb)   01/10/24 47.2 kg (104 lb)   11/29/23 49 kg (108 lb)   11/07/23 49.4 kg (109 lb)   10/09/23 51.1 kg (112 lb 10.5 oz)   09/01/23 49.4 kg (109 lb)   08/02/23 52.2 kg (115 lb)   07/25/23 50.3 kg (111 lb)       INTERVENTIONS  Implementation  Parenteral Nutrition/IV Fluids - Initiate PPN        Follow up/Monitoring  Progress toward goals will be monitored and evaluated per protocol.    Vani Galicia RDN [facility] 5B/10A ICU RD pager: [phone]   On Call Pager (weekends only): [phone]

## 2024-02-13 NOTE — CONSULTS
Care Management Initial Consult    General Information  Assessment completed with:  ,    Type of CM/SW Visit: Initial Assessment    Primary Care Provider verified and updated as needed: Yes   Readmission within the last 30 days: no previous admission in last 30 days      Reason for Consult: discharge planning  Advance Care Planning: Advance Care Planning Reviewed: no concerns identified          Communication Assessment  Patient's communication style: spoken language (English or Bilingual)    Hearing Difficulty or Deaf: no   Wear Glasses or Blind: yes    Cognitive  Cognitive/Neuro/Behavioral: WDL                      Living Environment:   People in home: child(ray), adult, grandchild(ray)  Julia  Current living Arrangements: house      Able to return to prior arrangements: yes       Family/Social Support:  Care provided by: self, child(ray)  Provides care for: no one  Marital Status:   Children          Description of Support System: Supportive, Involved    Support Assessment: Adequate family and caregiver support, Adequate social supports    Current Resources:   Patient receiving home care services: No     Community Resources: Dialysis Services  Equipment currently used at home: walker, rolling (pt reported using 4-ww mainly outside of the home but if feeling fatigued will use within the home.)  Supplies currently used at home: Diabetic Supplies, Nutritional Supplements, Enteral Nutrition & Supplies, Oxygen Tubing/Supplies    Employment/Financial:  Employment Status: disabled        Financial Concerns: none           Does the patient's insurance plan have a 3 day qualifying hospital stay waiver?  No    Lifestyle & Psychosocial Needs:  Social Determinants of Health     Food Insecurity: Not on file   Depression: Not at risk (11/29/2023)    PHQ-2     PHQ-2 Score: 0   Housing Stability: Not on file   Tobacco Use: Medium Risk (2/7/2024)    Patient History     Smoking Tobacco Use: Former     Smokeless Tobacco  Use: Never     Passive Exposure: Not on file   Financial Resource Strain: Not on file   Alcohol Use: Unknown (2/12/2021)    AUDIT-C     Frequency of Alcohol Consumption: 2-4 times a month     Average Number of Drinks: Not asked     Frequency of Binge Drinking: Not asked   Transportation Needs: Not on file   Physical Activity: Not on file   Interpersonal Safety: Not At Risk (9/1/2023)    Humiliation, Afraid, Rape, and Kick questionnaire     Fear of Current or Ex-Partner: No     Emotionally Abused: No     Physically Abused: No     Sexually Abused: No   Stress: Not on file   Social Connections: Not on file       Functional Status:  Prior to admission patient needed assistance:   Dependent ADLs:: Ambulation-walker  Dependent IADLs:: Independent  Assesssment of Functional Status: Not at baseline with mobility, Not at baseline with ADL Functioning    Mental Health Status:  Mental Health Status: No Current Concerns       Chemical Dependency Status:  Chemical Dependency Status: No Current Concerns             Values/Beliefs:  Spiritual, Cultural Beliefs, Mormon Practices, Values that affect care: no          Values/Beliefs Comment: Valer[place]    Additional Information:  Chart Review:  Sofie Rodriguez is a 63 year old female admitted on 2/10/2024.  Patient is an established lung transplant patient.  She is a direct admit from home for adult failure to thrive.  Patient reports living with her daughter and now 3 kids.  Patient reports an unintentional 40 pound weight loss in the last year.  Patient reports recent falls with walker and associated bruising on her face.  Patient receives tube feeds secondary to gastroparesis and small bowel dysmotility.  She reports inadequate intake from that source.  Patient can reportedly tolerate a regular diet orally.  TPN has been discussed, however will defer until a formal nutrition evaluation takes place.     Thus RNCC met with patient at bedside to introduce role of RNCC  as well as  complete initial assessment and discuss discharge planning. Patient states she lives on the lower level of a house and her daughter Julia and three grandchildren ages 4. 6 and 2 months live upstairs. Patient states she is independent with ADL's and uses a walker around the house and when in the community. Patient states she receives her protein packets, formula, syringes, split gauze from [facility], denies receiving other services from [facility].     Patient  goes to dialysis three times a week Tuesday,Thursday and Saturday at [facility] in [place], [place] and uses [employer] and schedules her own rides. Patient states she will use this company for transport home also when discharge is imminent.    Patient states she is comfortable doing TPN at home , awaiting benefits from [facility]: Pt has chair time of 11:45 AM T/TH/Sat  Phone number: [phone]  Fax: [phone]     [employer] - [phone]     Home 02 through [employer]: Fax: [phone] Phone: [phone]  [facility] Refferal: Fax [phone]     Pulmonary rehab at [facility]  Fax to [phone].      [facility]: Pt has chair time of 11:45 AM T/TH/Sat  Phone number: [phone]  Fax: [phone]      For Weekend & Holiday on call RN Care Coordinator:  (Tasks: Home care, home infusion, medical equipment/oxygen, transportation, IMM & MOON forms, etc.)     Text Paging in [employer] Smart [employer] is the preferred method of contact for these teams     [place] [facility] [place] ([id number]) Saturday & Sunday; ([id number]) [facility] Recognized Holidays  Pager #1: [phone] Units: 4A, 4C, 4E, 5A & 5B   Pager #2: [phone] Units: 6A, 6B, 6C, 6D  Pager #3: [phone] Units: 7A, 7B, 7C, 7D & 5C   Pager #4: [phone] Units: 5 Ortho, 8A, 10 ICU, & [facility]      For Weekend & Holiday on call Social Work:  (Tasks: TCU, transportation, Hospice, adjustment to illness counseling, Health Care  Directives, Child Protection and Domestic Violence concerns, Vulnerable Adult, IMM forms, etc.)     Text Paging in [facility] Smart Web is the preferred method of contact for these teams     [place] (0800 - 1630) Saturday and Sunday  Pager: [phone] Units: 4A, 4C, 4E, 5A and 5B   Pager: [phone] Units: 6A, 6B, 6C, 6D   Pager: [phone]: 7A, 7B, 7C, 7D, and 5C      [facility] ([id number]) Saturday and Sunday  Units: 5 Ortho, 8A, and 10 ICU   Pager: [phone]

## 2024-02-13 NOTE — PROGRESS NOTES
[facility]    Medicine Progress Note - Hospitalist Service, GOLD TEAM 19    Date of Admission:  2/10/2024    Assessment & Plan   Sofie Rodriguez is a 63 year old female admitted on 2/10/2024.  She has a history of bilateral lung transplant, ESRD on HD (T/Th/Sat), gastroparesis s/p PEG/J, malnutrition, recent right femoral fracture s/p ORIF in [facility] presenting as a direct admit from home to the hospital due to concerns of failure to thrive and small bowel dysmotility.  Of note, patient recently had a mechanical fall at home about 2 days prior to this admission.    # Adult failure to thrive  # Unintentional 40 pound weight loss  # Gastroparesis  # Small bowel dysmotility  # Feeding tube status  -Patient also had a mechanical fall at home about 2 days prior to this admission.   -Patient has lost about 30% of body weight for the last year  -She does report adequate p.o. intake, however, she is likely not meeting the nutritional needs at home  -She has a low risk factors including being s/p lung transplant, also has ESRD on HD.  -Documented weights this admission 95 lbs, in January 2023, weight was 136 lbs   - PT, OT  - Care management consult  - Nutrition consult  -Patient will need to be initiated on TPN  -Discussed with IR 2/13, patient on schedule for tunneled catheter 2/14.  -Nutrition pharmacy consulted to initiate TPN  -Per conversation with nephrology, no more than 1.5 L/day fluid restriction via TPN  -GI also consulted, recommendations pending  -NPO after midnight for planned tunneled catheter 2/14.     # Hypertension  - Blood pressure appears at goal  - Continue PTA antihypertensive regimen     # End-stage renal disease on hemodialysis  - Nephrology consulted, no indication for urgent dialysis, patient to resume dialysis on Tuesday.     # Status post lung transplant  - Appreciate pulmonary transplant consultation  - Continue PTA immunosuppressive agent:  "Prednisone 5 mg every morning, 2.5 mg every afternoon, tacrolimus 4 mg every morning, 4 mg every afternoon  - Timed tacrolimus level ordered for 2/15  -Continue PTA dapsone for PJP prophylaxis    # GERD, GI Ppx  - continue PTA Protonix    # Right hip fracture s/p ORIF (December 2023)  -Patient had right femur fracture in December 2023.  Underwent ORIF at OSH.  -Did have mechanical fall about 2 days before the current admission.  Complaining of some mild right hip pain.  -X-ray right hip obtained this admission shows no evidence of acute displaced fracture.    # Recent fall with right facial trauma, ecchymoses:  Ecchymoses, fading.  Pain much improved over the past few days.    # Macrocytic anemia:  Chronic.  Multifactorial including ACD, ESRD.  No bleeding recently clinically aside from facial ecchymoses.  LFTs normal, TSH normal 2022.  -Check B12, folic acid 2/14    #DVT PPX:  Receiving subcutaneous heparin.  -Hold heparin dose this evening for planned tunneled catheter by IR 2/14          Diet: Regular Diet Adult  NPO per Anesthesia Guidelines for Procedure/Surgery Except for: Meds  parenteral nutrition - Clinimix E    DVT Prophylaxis: Heparin SQ  Dong Catheter: Not present  Lines: PRESENT             Cardiac Monitoring: None  Code Status: Full Code      Clinically Significant Risk Factors              # Hypoalbuminemia: Lowest albumin = 3.1 g/dL at 2/13/2024  7:00 AM, will monitor as appropriate            # Cachexia: Estimated body mass index is 16.95 kg/m  as calculated from the following:    Height as of 2/7/24: 1.6 m (5' 3\").    Weight as of this encounter: 43.4 kg (95 lb 10.9 oz)., PRESENT ON ADMISSION  # Severe Malnutrition: based on nutrition assessment, PRESENT ON ADMISSION   # Financial/Environmental Concerns: none         Disposition Plan      Expected Discharge Date: 02/14/2024      Destination: home with family;home with help/services              Jesús Lopez MD  Hospitalist Service, " GOLD TEAM 19  [facility]  Securely message with [employer] (more info)  Text page via [facility] Paging/Directory   See signed in provider for up to date coverage information  ______________________________________________________________________    Interval History   Patient states that she had her GJ tube placed 7/2022.  She states that initially she tolerated tube feedings well for about the first 9 months, but since then has had progressive issues over the satiety, dyspepsia nausea after eating as well as frequent loose stools particularly after any oral intake or tube feedings.  She states despite all that she has a strong appetite, but again has early satiety and postprandial dyspepsia nausea with eating oral intake.  She tends to have 4-6 loose stools daily depending on her nutritional intake.  She denies any abdominal pain.  She denies any odynophagia or difficulty swallowing.  She does state that she fell last Thursday while trying to adjust her four-wheel walker, falling on her right face resulting in bruising.  The pain has continued to improve.  She also broke her femur 12/2023 and remains weak since that event.  She also reports 45 pound weight loss over the past year.  She denies any fevers or chills.  No dyspnea or cough.  No orthopnea.    Physical Exam   Vital Signs: Temp: 97.6  F (36.4  C) Temp src: Oral BP: 124/75 Pulse: 82   Resp: 13 SpO2: 93 % O2 Device: None (Room air)    Weight: 95 lbs 10.87 oz  General: Alert, oriented x 4.  Very pleasant, appropriate.  Appears cachectic but comfortable.  HEENT: Diffuse right facial and upper neck ecchymoses, fading.  No palpable hematomas.  Chest: Fibrotic rales in the right lower lung fields, left lower left midlung fields.  No wheezes.  No rhonchi.  CV: RRR.  1-2/6 systolic murmur at LLSB  Abdomen: NABS.  Thin.  Soft, nontender, nondistended.  GJ tube site clean.  Extremities: 2+ BLE edema.  1+ left upper extremity  edema.  Left upper extremity fistula palpable.  Neuro: Generally weak but nonfocal.    50 MINUTES SPENT BY ME on the date of service doing chart review, history, exam, documentation & further activities per the note.      Data      CMP  Recent Labs   Lab 02/13/24  1226 02/13/24  0700 02/13/24  0618 02/13/24  0408 02/12/24  1845 02/12/24  0756 02/11/24  0852 02/07/24  1338   NA  --  140  --   --   --  144  --  141   POTASSIUM  --  5.0  --   --   --  4.0 3.8 3.6   CHLORIDE  --  104  --   --   --  107  --  106   CO2  --  23  --   --   --  26  --  26   ANIONGAP  --  13  --   --   --  11  --  9   * 100* 94 117*   < > 177*  --  145*   BUN  --  33.4*  --   --   --  27.1*  --  22.9   CR  --  5.41*  --   --   --  4.39*  --  3.89*   GFRESTIMATED  --  8*  --   --   --  11*  --  12*   ESTUARDO  --  8.4*  --   --   --  8.9  --  8.7*   MAG  --  2.4*  --   --   --  2.4* 2.2  --    PHOS  --  4.7*  --   --   --  3.5 2.9  --    PROTTOTAL  --  5.5*  --   --   --  5.9*  --  5.8*   ALBUMIN  --  3.1*  --   --   --  3.6  --  3.5   BILITOTAL  --  0.2  --   --   --  0.3  --  0.3   ALKPHOS  --  56  --   --   --  67  --  67   AST  --  31  --   --   --  17  --  16   ALT  --  8  --   --   --  7  --  7    < > = values in this interval not displayed.     CBC  Recent Labs   Lab 02/13/24  0700 02/12/24  0756 02/07/24  1338   WBC 4.5 5.3 4.6   RBC 2.27* 2.56* 2.47*   HGB 9.1* 9.2* 8.9*   HCT 28.1* 30.9* 29.6*   * 121* 120*   MCH 40.1* 35.9* 36.0*   MCHC 32.4 29.8* 30.1*   RDW 15.8* 15.4* 14.3    216 165     INR  Recent Labs   Lab 02/13/24  0700   INR 0.97     Arterial Blood GasNo lab results found in last 7 days.Venous Blood Gas  No lab results found in last 7 days.  Hemoglobin A1C   Date Value Ref Range Status   07/11/2023 <4.2 <5.7 % Final     Comment:     Normal <5.7%   Prediabetes 5.7-6.4%    Diabetes 6.5% or higher     Note: Adopted from ADA consensus guidelines.   11/11/2022 5.2 <5.7 % Final     Comment:     Normal <5.7%    Prediabetes 5.7-6.4%    Diabetes 6.5% or higher     Note: Adopted from ADA consensus guidelines.   06/28/2022 5.8 (H) <5.7 % Final     Comment:     Normal <5.7%   Prediabetes 5.7-6.4%    Diabetes 6.5% or higher     Note: Adopted from ADA consensus guidelines.   11/13/2020 5.1 <5.7 % Final     Results for orders placed or performed during the hospital encounter of 02/10/24   XR Hip Right 2-3 Views    Narrative    EXAM: XR HIP RIGHT 2-3 VIEWS  LOCATION: [facility]  DATE: 2/12/2024    INDICATION: Recent right hip ORIF, also experienced recent fall. Complaining of pain.  COMPARISON: 06/14/2021.      Impression    IMPRESSION: Postop changes fixation right proximal femur securing a fracture. Healing is incomplete. Mild underlying degenerative changes. Alignment is near-anatomic. No evidence of an acute displaced fracture elsewhere throughout the right hip.   Partially visible gastrojejunostomy.     *Note: Due to a large number of results and/or encounters for the requested time period, some results have not been displayed. A complete set of results can be found in Results Review.

## 2024-02-13 NOTE — PROGRESS NOTES
"  Nephrology Progress Note  02/13/2024         Assessment & Recommendations:   Sofie Rodriguez is a 63 year old year old female    62yo F with ESKD, lung transplant in 6/28/22, and gastroparesis admitted with failure to thrive and unintentional weight loss. She also reports LE edema and L arm edema at the elbow. She reports feeling \"sick\" whenever she has tube feeds or oral food intake. She has nausea but no vomiting. Neph is consulted for HD.     # ESKD - TTS, LUE AVG, 3hr, [facility]. She has been losing weight and now even below her recent set EDW.  - HD today per TTS for 3 hrs and plan 1-2 L remoal  - Renal panel on TTS  # FTT  # Chronic diarrhea  Working-up currently. Not on any cellcept. Team is planning TPN.  - Please limit fluid < 1.5 L per day for TPN  #Hypertension - BP on low side. Noted Furosemide at home but she does not urinate so ok to discontinue.   On metoprolol solution 25 mg twice a day  # Anemia 2/2 ESKD  On Venofer 50 qwk, Mircera last dose 1/9. Hb 8-9.  - Will continue Venofer 50 mcg q week (Tuesday)  - Will start Epogen 4000 U with run while she is admitted  # BMD   Phos are normal to low.   - On home sevelamer 800 mg TID.   - Reduced Sevelamer to 800 mg BID since 2/12/24     Recommendations were communicated to primary team via  note     Ravin Johnston MD   Division of Renal Disease and Hypertension  [employer]  Vocera Web Console    Interval History :   Nursing and provider notes from last 24 hours reviewed.  In the last 24 hours Sofie Rodriguez has no acute event. But continues to have diarrhea. I/O was not recorded and I have ordered strict I/O today.  Team plans for port placement for TPN initiation.  I examined the patient during dialysis.  And she has been doing okay without any complaint.    Review of Systems:   I reviewed the following systems:  10 system are negative except as mentioned above    Physical Exam:   No intake/output data recorded.   /74 " (BP Location: Right arm)   Pulse 70   Temp 98.4  F (36.9  C) (Oral)   Resp 18   Wt 43.4 kg (95 lb 9.6 oz)   SpO2 94%   BMI 16.93 kg/m       GENERAL APPEARANCE: Alert, NAD, pleasant  EYES:  No scleral icterus, pupils equal; + vinayak orbital and facial ecchymosis  PULM: lungs clear to auscultation bilaterally, equal air movement, no clubbing  CV: regular rhythm, normal rate, no rub     -edema Trace to 1+ edema   GI: soft, non tender, no distended, bowel sounds are present  INTEGUMENT: no cyanosis, no rash  NEURO:  no asterixis   Access Left AVG: + Bruit and thrill    Labs:   All labs reviewed by me  Electrolytes/Renal -   Recent Labs   Lab Test 02/13/24  0618 02/13/24  0408 02/13/24  0002 02/12/24  1845 02/12/24  0756 02/11/24  0852 02/07/24  1338 02/01/24  1113 01/19/24  0846 01/10/24  0948 07/14/23  0818 07/11/23  1217   NA  --   --   --   --  144  --  141  --   --  137   < > 140   POTASSIUM  --   --   --   --  4.0 3.8 3.6  --   --  4.6   < > 4.4   CHLORIDE  --   --   --   --  107  --  106 105   < > 98   < > 101   CO2  --   --   --   --  26  --  26  --   --  28   < > 29   BUN  --   --   --   --  27.1*  --  22.9  --   --  27.6*   < > 10.2   CR  --   --   --   --  4.39*  --  3.89*  --   --  3.76*   < > 3.40*   GLC 94 117* 129*   < > 177*  --  145*  --   --  79   < > 104*   ESTUARDO  --   --   --   --  8.9  --  8.7*  --   --  9.4   < > 9.4   MAG  --   --   --   --  2.4* 2.2  --   --   --  2.3   < > 2.0   PHOS  --   --   --   --  3.5 2.9  --   --   --   --   --  2.1*    < > = values in this interval not displayed.       CBC -   Recent Labs   Lab Test 02/13/24  0700 02/12/24  0756 02/07/24  1338   WBC 4.5 5.3 4.6   HGB 9.1* 9.2* 8.9*    216 165       LFTs -   Recent Labs   Lab Test 02/12/24  0756 02/07/24  1338 07/11/23  1217   ALKPHOS 67 67 65   BILITOTAL 0.3 0.3 0.6   ALT 7 7 7   AST 17 16 23   PROTTOTAL 5.9* 5.8* 7.1   ALBUMIN 3.6 3.5 4.2       Iron Panel -   Recent Labs   Lab Test 09/26/22  0555 09/03/22  1039  08/24/22  0810   IRON 54 21* 41   IRONSAT 22 9* 21   CARLOS 769* 343* 334*         Imaging:  All imaging studies reviewed by me.     Current Medications:   calcium carbonate-vitamin D  1 tablet Per J Tube TID w/meals    cyanocobalamin  500 mcg Per Feeding Tube Daily    dapsone  50 mg Per J Tube Q Mon Wed Fri AM    epoetin tre-epbx  4,000 Units Intravenous Once in dialysis/CRRT    heparin ANTICOAGULANT  5,000 Units Subcutaneous Q12H    iron sucrose  50 mg Intravenous Once in dialysis/CRRT    lipids plant base  250 mL Intravenous Q24H    metoprolol  25 mg Per J Tube BID    multivitamins w/minerals  15 mL Per Feeding Tube Daily    - MEDICATION INSTRUCTIONS -   Does not apply Once    pantoprazole  40 mg Per J Tube BID    predniSONE  5 mg Per J Tube QAM    And    predniSONE  2.5 mg Per J Tube QPM    sevelamer carbonate (RENVELA)  0.8 g Oral BID    sodium chloride 0.9%  250 mL Intravenous Once in dialysis/CRRT    sodium chloride 0.9%  300 mL Hemodialysis Machine Once    tacrolimus  4 mg Per J Tube QAM    And    tacrolimus  4.5 mg Per J Tube QPM      dextrose      sodium chloride 0.9%      - MEDICATION INSTRUCTIONS -       Ravin Johnston MD

## 2024-02-13 NOTE — PROGRESS NOTES
HEMODIALYSIS TREATMENT NOTE    Date: 2/13/2024  Time: 11:47 AM    Data:  Pre Wt: 43.4 kg   Desired Wt: 42.4 kg   Post Wt: 42.4 kg    Weight change: 1 kg  Ultrafiltration - Post Run Net Total Removed (mL): 1000 mL    Vascular Access Status: AVG patent  Dialyzer Rinse: Streaked    Total Blood Volume Processed: 67.96 L   Total Dialysis (Treatment) Time: 3     Dialysate Bath: K 3, Ca 2.25  Heparin: None    Lab:   No    Interventions:  Pt dialyzed for 3 hrs via LUE AVG. Pt tolerated 1L fluid removal. Pt was given Tyelnol for headache with relief. Epoetin and Venofer was also administered during tx. 450BFR achieved with good pressure. Hemostasis achieved within 10 min post tx. Handoff report given to RIYA Arriaga.      Assessment:  A&Ox4  Calm and cooperative  Denied of SOB and NV  VSS  AVG +T/B     Plan:    Next HD per renal

## 2024-02-13 NOTE — PROGRESS NOTES
Home Infusion  Received referral from Kamala Padilla [facility] for IV TPN.  Benefits verified.  Patient has Medicare and [facility] PMAP and is covered 100%.  Called and spoke with Sofie to review home infusion services, review benefits and offer choice of providers.  Patient would like to remain in the [facility] system and will use [facility] for home infusion.  Confirmed discharge address, phone, and emergency contact information. Patient denies recent illness (not related to pre-existing conditions) or travel in the house hold. Confirmed allergies. No home health agency has been seeing the patient.     Sofie is willing to learn and manage home IV therapy.  Questions answered.  Plan for Oz Murrell to provide home care services after discharge.    [facility] will continue to follow until discharge and update pt once final orders are determined.    Thank you for the referral    Sebastian Harper LPN, Coordinator   [facility]   [email or web address]  Office: [phone]

## 2024-02-13 NOTE — PLAN OF CARE
Goal Outcome Evaluation:  Pt is A/O x4. IND in room to Bathroom. Pt advised to use call light when getting out of bed or when requiring help. Pt stated that they have been having diarrhea and that they did not want to wait for RN or aid to assist to bathroom.     Pt denies pain overnight.     Pt requests medication through J-tube.     Lipid infusion running in R PIV

## 2024-02-13 NOTE — CONSULTS
GASTROENTEROLOGY CONSULTATION      Date of Admission:  2/10/2024  Requesting physician: Pablito Rod MD            Reason for Consultation:   Assistance with mgt of motility disorder. Patient of Dr. Navarro with severe motility disorder, inability to tolerate TF.            ASSESSMENT AND RECOMMENDATIONS:   Assessment:  Sofie Rodriguez is a 63 year old female who was directly admitted on 2/10 for failure to thrive and significant unintentional weight loss. The patient has a very complex medical history including end stage COPD s/p bilateral lung transplant on 6/28/22 complicated by acute limb ischemia of LUE s/p left radial thrombectomy, h/o C. Diff, h/o EBV viremia, ESRD on dialysis, hemobilia s/p ERCP presumably due to hemorrhage into gallbladder, gastroparesis s/p PEGJ placement with intolerance to tube feeding and s/p G-POEM on 10/9/23, chronic diarrhea with +SIBO testing.      #Gastroparesis s/p G-POEM without improvement  #J-tube feeding intolerance concern for small bowel hypomotility  #Weight loss  #Chronic Osmotic Diarrhea/SIBO  Developed gastroparesis following her lung transplant and underwent PEGJ placement by IR on 7/27/22. Gastric emptying study on 1/2/2023 show delayed emptying of 75% at 240 min. Underwent G-POEM on 10/9/23 with Dr. Navarro with no clinical improvement with repeat gastric emptying study showing worsening emptying.  Seen by Dr. Navarro on 1/24/2024.  Discussed restarting Reglan but she would like to hold off at that time which is reasonable given real concern of tardive dyskinesia with long term use.     At the same time, she was also noted to have intolerance to tube feeding given ongoing osmotic diarrhea likely due to small bowel dysmotility.  The small bowel dysmotility could also be from vagal nerve injury that led to gastroparesis. Has history of SIBO in the past (likely from small bowel hypomotility) and treated with several antibiotic regimens. Diarrhea was better for 1.5 month  however returned 10 days ago in the setting of changing tube feeding per her report. Last tube feeding was 3-4 days ago. She had more formed stool 2/14.     Given very limited medical option for small bowel and possibly pan-intestinal hypomotility from vagal nerve injury, we agree that TPN is likely the only option for her nutritional needs. Will also recommend trickle feeding via J tube, e.g. 5cc/hr to see if she can       Recommendations  - Consider trickle feeding via J tube at 5cc/hr to maintain bowel integrity       Thank you for involving us in this patient's care. Please do not hesitate to contact the GI service with any questions or concerns.     Pt care plan discussed with Dr. Cadena, GI staff physician.      Miranda Braswell MD PhD   GI Fellow   [phone]   -------------------------------------------------------------------------------------------------------------------           History of Present Illness:   Sofie Rodriguez is a 63 year old female who was directly admitted on 2/10 for failure to thrive and significant unintentional weight loss (40lb in one year). The patient has a very complex medical history including end stage COPD s/p bilateral lung transplant on 6/28/22 complicated by acute limb ischemia of LUE s/p left radial thrombectomy, h/o C. Diff, h/o EBV viremia, ESRD on dialysis, hemobilia s/p ERCP presumably due to hemorrhage into gallbladder, gastroparesis s/p PEGJ placement with intolerance to tube feeding and s/p G-POEM on 10/9/23, chronic diarrhea with +SIBO testing. GI was consulted for her suspected bowel dysmotility.             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    Hepatitis 2017    Hep C, [facility]     History of blood transfusion     HTN (hypertension)     Infectious mononucleosis     Lung infection 11/30/2022    Osteopenia             Past Surgical History:   Reviewed and edited as appropriate   Past Surgical History:   Procedure Laterality Date    BRONCHOSCOPY (RIGID OR FLEXIBLE), DIAGNOSTIC N/A 08/02/2022    Procedure: BRONCHOSCOPY, DIAGNOSTIC- inspection Bronch;  Surgeon: Kamala Lovell MD;  Location: [facility]    BRONCHOSCOPY (RIGID OR FLEXIBLE), DIAGNOSTIC N/A 09/13/2022    Procedure: INSPECTION BRONCHOSCOPY, WITH BRONCHOALVEOLAR LAVAGE;  Surgeon: Jose R Mccullough MD;  Location: [facility]    BRONCHOSCOPY (RIGID OR FLEXIBLE), DIAGNOSTIC N/A 11/09/2022    Procedure: BRONCHOSCOPY, WITH BRONCHOALVEOLAR LAVAGE AND BIOPSY;  Surgeon: Cesar Lima MD;  Location: [facility]    BRONCHOSCOPY (RIGID OR FLEXIBLE), DIAGNOSTIC N/A 01/25/2023    Procedure: BRONCHOSCOPY, WITH BRONCHOALVEOLAR LAVAGE AND BIOPSY;  Surgeon: Mason Reddy MD;  [facility]: [facility]    BRONCHOSCOPY (RIGID OR FLEXIBLE), DIAGNOSTIC N/A 04/19/2023    Procedure: BRONCHOSCOPY, WITH BRONCHOALVEOLAR LAVAGE AND BIOPSY;  Surgeon: Kamala Lovell MD;  [facility]: [facility]    BRONCHOSCOPY (RIGID OR FLEXIBLE), DIAGNOSTIC N/A 07/12/2023    Procedure: BRONCHOSCOPY, WITH BRONCHOALVEOLAR LAVAGE AND BIOPSY;  Surgeon: Cesar Lima MD;  [facility]: [facility]    BRONCHOSCOPY FLEXIBLE AND RIGID N/A 07/19/2022    Procedure: BRONCHOSCOPY inspection only;  Surgeon: Bob Liao MD;  [facility]    COLONOSCOPY  2015    CORONARY ANGIOGRAPHY ADULT ORDER      CV CORONARY ANGIOGRAM N/A 06/30/2021    Procedure: CV CORONARY ANGIOGRAM;  Surgeon: Alexander Cuellar MD;  Location:  [facility]ARD[facility] CATH LAB    CV RIGHT HEART CATH MEASUREMENTS RECORDED N/A 06/30/2021    Procedure: CV RIGHT HEART CATH;  Surgeon: Alexander Cuellar MD;  Location:  [facility]ATH [facility]    ENDOSCOPIC PERORAL MYOTOMY N/A 10/09/2023    Procedure: MYOTOMY, ESOPHAGUS, ENDOSCOPIC, ORAL APPROACH;   Surgeon: Gonzalez Navarro MD;  [facility]    ENDOSCOPIC RETROGRADE CHOLANGIOPANCREATOGRAM N/A 08/11/2022    Procedure: ENDOSCOPIC RETROGRADE CHOLANGIOPANCREATOGRAPHY WITH PANCREATIC DUCT NEEDLE KNIFE AND STENT PLACEMENT, BILE DUCT SPHINCTEROTOMY, BLOOD/DEBRIS REMOVAL AND STENT PLACEMENT;  Surgeon: Cosmo Arroyo MD;  [facility]    ENDOSCOPIC RETROGRADE CHOLANGIOPANCREATOGRAM N/A 10/07/2022    Procedure: ENDOSCOPIC RETROGRADE CHOLANGIOPANCREATOGRAPHY with biliary and pancreatic stent removal, debris removal;  Surgeon: Cosmo Arroyo MD;  [facility]    ENT SURGERY  1974    tonsillectomy    ENTEROSCOPY SMALL BOWEL N/A 08/11/2022    Procedure: SMALL BOWEL ENTEROSCOPY;  Surgeon: Cosmo Arroyo MD;  [facility]    ESOPHAGOGASTRODUODENOSCOPY, WITH NASOGASTRIC TUBE INSERTION N/A 07/01/2022    Procedure: ESOPHAGOGASTRODUODENOSCOPY, WITH NASOJEJUNAL TUBE INSERTION;  Surgeon: Ozzy Nickerson MD;  Location:  GI    ESOPHAGOSCOPY, GASTROSCOPY, DUODENOSCOPY (EGD), COMBINED N/A 08/03/2022    Procedure: ESOPHAGOGASTRODUODENOSCOPY (EGD);  Surgeon: Ira Andres MD;  Location:  GI    ESOPHAGOSCOPY, GASTROSCOPY, DUODENOSCOPY (EGD), COMBINED N/A 01/25/2023    Procedure: ESOPHAGOGASTRODUODENOSCOPY (EGD) with botox injection;  Surgeon: Gonzalez Navarro MD;  Location:  GI    ESOPHAGOSCOPY, GASTROSCOPY, DUODENOSCOPY (EGD), COMBINED N/A 10/09/2023    Procedure: ESOPHAGOGASTRODUODENOSCOPY;  Surgeon: Gonzalez Navarro MD;  [facility]    HAND SURGERY      INSERT CHEST TUBE Right 09/13/2022    Procedure: Insert chest tube;  Surgeon: Jose R Mccullough MD;  Location:  GI    IR CVC TUNNEL PLACEMENT > 5 YRS OF AGE  09/26/2022    IR GASTRO JEJUNOSTOMY TUBE CHANGE  08/31/2022    IR GASTRO JEJUNOSTOMY TUBE CHANGE  12/21/2022    IR GASTRO JEJUNOSTOMY TUBE CHANGE  07/12/2023    IR GASTRO JEJUNOSTOMY TUBE CHANGE  08/18/2023    IR GASTRO JEJUNOSTOMY TUBE CHANGE  11/14/2023    IR GASTRO  JEJUNOSTOMY TUBE PLACEMENT  07/27/2022    IR THORACENTESIS  08/29/2022    LEEP TX, CERVICAL  04/07/2017    HECTOR III    LYMPH NODE BIOPSY Left 2005    Left axilla, benign- [place]    MIDLINE INSERTION - DOUBLE LUMEN Left 07/28/2022    20cm, Basilic vein    REPLACE GASTROJEJUNOSTOMY TUBE, PERCUTANEOUS  10/07/2022    Procedure: Replace Gastrojejunostomy Tube;  Surgeon: Cosmo Arroyo MD;  [facility]    THORACENTESIS Left 08/29/2022    Procedure: THORACENTESIS;  Surgeon: Bo Capone PA-C;  Location: [facility] OR    THORACENTESIS Left 09/13/2022    Procedure: Thoracentesis;  Surgeon: Jose R Mccullough MD;  [facility]: [facility]    THROMBECTOMY UPPER EXTREMITY Left 07/02/2022    Procedure: LEFT RADIAL ARM THROMBECTOMY;  Surgeon: Christie Graham MD;  [facility]    TRANSPLANT LUNG RECIPIENT SINGLE X2 Bilateral 06/28/2022    Procedure: Clamshell Incision, Bilateral Sequential Lung Transplant, On Cardiopulmonary Bypass, Flexible Bronchoscopy;  Surgeon: Sue Sunshine MD;  [facility]            Social History:   Reviewed and edited as appropriate  Social History     Socioeconomic History    Marital status:      Spouse name: Not on file    Number of children: Not on file    Years of education: Not on file    Highest education level: Not on file   Occupational History    Not on file   Tobacco Use    Smoking status: Former     Packs/day: 0.50     Years: 30.00     Additional pack years: 0.00     Total pack years: 15.00     Types: Cigarettes     Quit date: 11/11/2020     Years since quitting: 3.2    Smokeless tobacco: Never   Substance and Sexual Activity    Alcohol use: Not Currently     Comment: Stopped drinking in 2020    Drug use: Not Currently     Types: Marijuana, Methamphetamines     Comment: hx:marijuana and methamphetamine-quit both unsure ?  2-3 yrs ago    Sexual activity: Not on file   Other Topics Concern    Parent/sibling w/ CABG, MI or angioplasty before 65F 55M? Not Asked    Social History Narrative    Not on file     Social Determinants of Health     Financial Resource Strain: Not on file   Food Insecurity: Not on file   Transportation Needs: Not on file   Physical Activity: Not on file   Stress: Not on file   Social Connections: Not on file   Interpersonal Safety: Not At Risk (9/1/2023)    Humiliation, Afraid, Rape, and Kick questionnaire     Fear of Current or Ex-Partner: No     Emotionally Abused: No     Physically Abused: No     Sexually Abused: No   Housing Stability: Not on file            Family History:   Reviewed and edited as appropriate  No family history on file.   No known history of colorectal cancer, liver disease, or inflammatory bowel disease.         Allergies:   Reviewed and edited as appropriate     Allergies   Allergen Reactions    Blood Transfusion Related (Informational Only) Other (See Comments)     Patient has a history of a clinically significant antibody against RBC antigens.  A delay in compatible RBCs may occur.     No Clinical Screening - See Comments Other (See Comments)     Patient has a history of a clinically significant antibody against RBC antigens.  A delay in compatible RBCs may occur.    Azithromycin Rash     12/1/22: Developed a rash that is not raised and looks diffuse in nature. It started in the groin and up the back and has now worked its way up her chest into her face. Pt states that it has now started to itch. She is breathing and talking normally and denies any airway changes. Unclear what started rash. Pt noted feeling somewhat itchy yesterday.    Ganciclovir Rash     12/1/22: Developed a rash that is not raised and looks diffuse in nature. It started in the groin and up the back and has now worked its way up her chest into her face. Pt states that it has now started to itch. She is breathing and talking normally and denies any airway changes. Unclear what started rash. Pt noted feeling somewhat itchy yesterday.            Medications:      Medications Prior to Admission   Medication Sig Dispense Refill Last Dose    acetaminophen (TYLENOL) 500 MG tablet 500-1,000 mg by Per J Tube route 3 times daily as needed for mild pain   2/10/2024    B Complex-C-Folic Acid (DIALYVITE) TABS 1 tablet by Per J Tube route every morning   2/10/2024    Calcium Carbonate-Vitamin D 600-10 MG-MCG TABS 1 tablet by Per J Tube route 3 times daily 90 tablet 11 2/10/2024    dapsone 2 mg/mL SUSP 25 mLs (50 mg) by Per J Tube route Every Mon, Wed, Fri Morning 1000 mL 11 2/9/2024    furosemide (LASIX) 40 MG tablet 40 mg by Per J Tube route 2 times daily   2/10/2024 at 0930    loperamide (IMODIUM A-D) 2 MG tablet 1 tablet (2 mg) by Per J Tube route 4 times daily as needed for diarrhea   2/10/2024    metoprolol tartrate (LOPRESSOR) 50 MG tablet 0.5 tablets (25 mg) by Per Feeding Tube route 2 times daily 60 tablet 11 2/10/2024 at 0930    multivitamin (CENTRUM SILVER) tablet Take 1 tablet by mouth daily   2/10/2024 at 0930    pantoprazole (PROTONIX) 2 mg/mL SUSP suspension 20 mLs (40 mg) by Per J Tube route 2 times daily 1200 mL 11 2/10/2024 at 0930    predniSONE (DELTASONE) 5 MG tablet Take 1 tablet (5 mg) by mouth every morning AND 0.5 tablets (2.5 mg) every evening. Take 1 tab (5mg) at AM and 1/2 tab (2.5) in pm (Patient taking differently: Take 1 tablet (5 mg) by J-tube every morning AND 0.5 tablets (2.5 mg) every evening. Take 1 tab (5mg) at AM and 1/2 tab (2.5) in pm) 135 tablet 3 2/10/2024 at 0930    protein modular (PROSOURCE TF) LIQD 1 packet by Per Feeding Tube route daily (Patient taking differently: 1 packet by Per Feeding Tube route daily at 2 pm) 30 packet 11 2/9/2024    sevelamer carbonate, RENVELA, 0.8 GM PACK Packet Take 1 packet (0.8 g) by mouth 3 times daily (with meals) (Patient taking differently: 0.8 g by Per J Tube route 3 times daily (with meals))   Past Week    tacrolimus (GENERIC) 1 mg/mL suspension Take 4 mLs (4 mg) by mouth every morning AND 4 mLs (4 mg)  every evening. (Patient taking differently: Take 4 mLs (4 mg) by j-tube every morning AND 4 mLs (4 mg) every evening.)   2/10/2024 at 0930    vitamin B-12 (CYANOCOBALAMIN) 500 MCG tablet 1 tablet (500 mcg) by Per Feeding Tube route daily 90 tablet 3 2/10/2024             Review of Systems:     A complete 10 point review of systems was performed and is negative except as noted in the HPI           Physical Exam:   /65 (BP Location: Right arm, Cuff Size: Adult Small)   Pulse 92   Temp 97.6  F (36.4  C) (Oral)   Resp 20   Wt 43.4 kg (95 lb 9.6 oz)   SpO2 94%   BMI 16.93 kg/m    Wt:   Wt Readings from Last 2 Encounters:   02/10/24 43.4 kg (95 lb 9.6 oz)   02/07/24 46.7 kg (102 lb 14.4 oz)      Constitutional: No acute distress, resting comfortably in bed  Eyes: Sclera anicteric  Ears/nose/mouth/throat: Moist mucus membranes, hearing intact  Neck: supple  CV: No edema  Respiratory: Breathing comfortably on room air  Abd: Soft, nontender, nondistended, bowel sounds present  Skin: bruise on face and arms  Neuro: AAO x 3  Psych: Normal affect  MSK: No gross deformities         Data:   Labs and imaging below were independently reviewed and interpreted    BMP  Recent Labs   Lab 02/13/24  0700 02/13/24  0618 02/13/24  0408 02/13/24  0002 02/12/24  1845 02/12/24  0756 02/11/24  0852 02/07/24  1338     --   --   --   --  144  --  141   POTASSIUM 5.0  --   --   --   --  4.0 3.8 3.6   CHLORIDE 104  --   --   --   --  107  --  106   ESTURADO 8.4*  --   --   --   --  8.9  --  8.7*   CO2 23  --   --   --   --  26  --  26   BUN 33.4*  --   --   --   --  27.1*  --  22.9   CR 5.41*  --   --   --   --  4.39*  --  3.89*   * 94 117* 129*   < > 177*  --  145*    < > = values in this interval not displayed.     CBC  Recent Labs   Lab 02/13/24  0700 02/12/24  0756 02/07/24  1338   WBC 4.5 5.3 4.6   RBC 2.27* 2.56* 2.47*   HGB 9.1* 9.2* 8.9*   HCT 28.1* 30.9* 29.6*   * 121* 120*   MCH 40.1* 35.9* 36.0*   MCHC 32.4  29.8* 30.1*   RDW 15.8* 15.4* 14.3    216 165     INR  Recent Labs   Lab 02/13/24  0700   INR 0.97     LFTs  Recent Labs   Lab 02/13/24  0700 02/12/24  0756 02/07/24  1338   ALKPHOS 56 67 67   AST  --  17 16   ALT  --  7 7   BILITOTAL 0.2 0.3 0.3   PROTTOTAL 5.5* 5.9* 5.8*   ALBUMIN 3.1* 3.6 3.5      PANCNo lab results found in last 7 days.    Imaging: Reviewed

## 2024-02-13 NOTE — PROGRESS NOTES
Pulmonary Medicine  Cystic Fibrosis - Lung Transplant Team  Progress Note  2024    Patient: Sofie Rodriguez  MRN: [id number]  : 1960 (age 63 year old)  Transplant: 2022 (Lung), POD#[id number]  Admission date: 2/10/2024  Primary Care Provider: Vinny Berrios    Assessment & Plan:   Sofie Rodriguez is a 63 year old female with h/o bilateral lung transplant for COPD on 22 with course complicated by post-operative hemidiaphragm palsy, recurrent PNAs, positive DSA, EBV viremia, hypogammaglobulinemia, severe gastroparesis s/p G/J tube placement 22 with pyloric botox 23, GI bleed  pyloric ulcer, hemobilia s/p ERCP and MRCP, chronic diarrhea, recurrent C diff colitis, and ESRD on HD. Admitted May 2023 for FTT, supposed to be readmitted in July for FTT, but refused. Admitted to OSH - in December for right hip fracture s/p ORIF, now with significant deconditioning and ongoing severe malnutrition with gastroparesis, small bowel hypermotility and failure to tolerate any tube feeds. After extensive discussion with the dietician, GI and ultimately convincing the patient, the patient was admitted on 2/10 for initiation of TPN/lipids.     Recommendations:   - Scheduled for tunneled single lumen CVC with IR tomorrow for TPN  - Dietician/Pharmacy following for TPN/lipds  - Please continue all meds through J tube  - DSA () pending  - Tacro dose increased , trough level ordered for 2/15  - Work with PT and OT    S/p bilateral lung transplant:   Right hemidiaphragm palsy:   Suspected CARLEE: seen in clinic last week, severely deconditioned, in setting of FTT, malnutrition, hip fracture as above. CMV  negative. ImmuKnow 108 and cfDNA 0.12 on 1/10. CT  with decreased MARLON opacities, new tree in bud RLL opacities. PFTs unchanged in clinic (ATS criteria not met)but /2024 PFTs remain significantly below baseline (1.4-1.5L). Sating mid 90s on room air. Does appear to  desaturate at night.     - Schedule follow up with Sleep to discuss possible CPAP, given recommendations from August sleep study  - Will work with PT and OT while inpatient, will assess rehab needs    Immunosuppression:  - Tacrolimus 4 mg BID.  Goal level 7-9.  Tacrolimus level 12/12 5.9. Continued 4 mg qAM and increased to 4.5 mg qPM, recheck a timed level on Thursday (ordered)  - Prednisone 5 mg/2.5 mg    Prophylaxis:   - Dapsone 50 mg q MWf for PJP ppx    Positive DSA: No prior treatment for AMR, has been watched for quite some time given no pulmonary symptoms, prior PFTs stability and significant and ongoing failure to thrive. Jan 2024 DSA improved to 5723, however with ongoing lower PFTs, may need to consider AMR treatment, however, unclear how she would tolerate.   - DSA (2/7) pending     EBV viremia: last level of 96K (2/7), CT c/a/p (2/7) without adenopathy.  - EBV recheck in 3 months    Severe protein calorie malnutrition:  FTT:   Gastroparesis s/p PEG/J s/p botox and G-POEM:   SB Hypomotility  Pyloric ulcer:  Chronic nausea and osmotic diarrhea:  SIBO s/p rifaximin:   Recurrent C diff colitis: Chronic diarrhea since transplant with recurrent episodes of C diff. GI previously consulted, felt stools consistent with osmotic diarrhea. Over the last year has lost 40 lbs, unable to tolerate any combination of TF, most recently elemental formula. Normal fecal elastase last May. Following with Dr. Navarro who feels her main two issues are vagal injury induced gastroparesis and probably small bowel hypomotility. Her intolerance to J-tube feeds suggests the latter to be a significant issue, and per Dr. Navarro no clear treatment options. In agreement that TPN is likely her only option.   - Luminal GI to see today  - RD and Pharmacy following  - Port placement with IR tomorrow  - Continue imodium BID, pantoprazole    ESRD on HD  Nephrology following, plan for dialysis today.      We appreciate the excellent care provided  by the [facility] team.  Recommendations communicated via this note.  Will continue to follow along closely, please do not hesitate to call with any questions or concerns.    Yulisa Huff MD PhD  Lung Transplant  Pulmonary Critical Care     Interval History:   Notes reviewed. Intermittent desaturations charted. This afternoon patient denies any issues with breathing currently, no significant cough. Does not feel short of breath at night. As noted previously reports only able to take in small amounts of food as will have diarrhea, abdominal pain, and nausea. Started to not even tolerate any tube feeds prior to admission. Did fall before admission. Was sitting on walker, bent over and walker wheeled out from underneath causing her to fall face first onto concrete and break her eyeglasses.     Review of Systems:     Complete ROS negative except as noted in HPI.    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     Hepatitis 2017    Hep C, [facility]     History of blood transfusion      HTN (hypertension)      Infectious mononucleosis      Lung infection 11/30/2022     Osteopenia      Past Surgical History:   Procedure Laterality Date     BRONCHOSCOPY (RIGID OR FLEXIBLE), DIAGNOSTIC N/A 08/02/2022    Procedure: BRONCHOSCOPY, DIAGNOSTIC- inspection Bronch;  Surgeon: Kamala Lovell MD;  Location: [facility]     BRONCHOSCOPY (RIGID OR FLEXIBLE), DIAGNOSTIC N/A 09/13/2022    Procedure: INSPECTION BRONCHOSCOPY, WITH BRONCHOALVEOLAR LAVAGE;  Surgeon: Jose R Mccullough MD;  [facility]     BRONCHOSCOPY (RIGID OR FLEXIBLE), DIAGNOSTIC N/A 11/09/2022    Procedure: BRONCHOSCOPY, WITH BRONCHOALVEOLAR LAVAGE AND BIOPSY;  Surgeon: Cesar Lima MD;  Location: [facility]      BRONCHOSCOPY (RIGID OR FLEXIBLE), DIAGNOSTIC N/A 01/25/2023    Procedure: BRONCHOSCOPY, WITH BRONCHOALVEOLAR LAVAGE AND BIOPSY;  Surgeon: Mason Reddy MD;  Location:  GI     BRONCHOSCOPY (RIGID OR FLEXIBLE), DIAGNOSTIC N/A 04/19/2023    Procedure: BRONCHOSCOPY, WITH BRONCHOALVEOLAR LAVAGE AND BIOPSY;  Surgeon: Kamala Lovell MD;  Location:  GI     BRONCHOSCOPY (RIGID OR FLEXIBLE), DIAGNOSTIC N/A 07/12/2023    Procedure: BRONCHOSCOPY, WITH BRONCHOALVEOLAR LAVAGE AND BIOPSY;  Surgeon: Cesar Lima MD;  Location:  GI     BRONCHOSCOPY FLEXIBLE AND RIGID N/A 07/19/2022    Procedure: BRONCHOSCOPY inspection only;  Surgeon: Bob Liao MD;  Location:  GI     COLONOSCOPY  2015     CORONARY ANGIOGRAPHY ADULT ORDER       CV CORONARY ANGIOGRAM N/A 06/30/2021    Procedure: CV CORONARY ANGIOGRAM;  Surgeon: Alexander Cuellar MD;  Location:  [facility]     CV RIGHT HEART CATH MEASUREMENTS RECORDED N/A 06/30/2021    Procedure: CV RIGHT HEART CATH;  Surgeon: Alexander Cuellar MD;  Location:  [facility]     ENDOSCOPIC PERORAL MYOTOMY N/A 10/09/2023    Procedure: MYOTOMY, ESOPHAGUS, ENDOSCOPIC, ORAL APPROACH;  Surgeon: Gonzalez Navarro MD;  Location:  OR     ENDOSCOPIC RETROGRADE CHOLANGIOPANCREATOGRAM N/A 08/11/2022    Procedure: ENDOSCOPIC RETROGRADE CHOLANGIOPANCREATOGRAPHY WITH PANCREATIC DUCT NEEDLE KNIFE AND STENT PLACEMENT, BILE DUCT SPHINCTEROTOMY, BLOOD/DEBRIS REMOVAL AND STENT PLACEMENT;  Surgeon: Cosmo Arroyo MD;  Location:  OR     ENDOSCOPIC RETROGRADE CHOLANGIOPANCREATOGRAM N/A 10/07/2022    Procedure: ENDOSCOPIC RETROGRADE CHOLANGIOPANCREATOGRAPHY with biliary and pancreatic stent removal, debris removal;  Surgeon: Cosmo Arroyo MD;  Location:  OR     ENT SURGERY  1974    tonsillectomy     ENTEROSCOPY SMALL BOWEL N/A 08/11/2022    Procedure: SMALL BOWEL ENTEROSCOPY;  Surgeon: Cosmo Arroyo MD;  Location:  OR      ESOPHAGOGASTRODUODENOSCOPY, WITH NASOGASTRIC TUBE INSERTION N/A 07/01/2022    Procedure: ESOPHAGOGASTRODUODENOSCOPY, WITH NASOJEJUNAL TUBE INSERTION;  Surgeon: Ozzy Nickerson MD;  Location:  GI     ESOPHAGOSCOPY, GASTROSCOPY, DUODENOSCOPY (EGD), COMBINED N/A 08/03/2022    Procedure: ESOPHAGOGASTRODUODENOSCOPY (EGD);  Surgeon: Ira Andres MD;  Location:  GI     ESOPHAGOSCOPY, GASTROSCOPY, DUODENOSCOPY (EGD), COMBINED N/A 01/25/2023    Procedure: ESOPHAGOGASTRODUODENOSCOPY (EGD) with botox injection;  Surgeon: Gonzalez Navarro MD;  Location:  GI     ESOPHAGOSCOPY, GASTROSCOPY, DUODENOSCOPY (EGD), COMBINED N/A 10/09/2023    Procedure: ESOPHAGOGASTRODUODENOSCOPY;  Surgeon: Gonzalez Navarro MD;  Location:  OR     HAND SURGERY       INSERT CHEST TUBE Right 09/13/2022    Procedure: Insert chest tube;  Surgeon: Jose R Mccullough MD;  Location:  GI     IR CVC TUNNEL PLACEMENT > 5 YRS OF AGE  09/26/2022     IR GASTRO JEJUNOSTOMY TUBE CHANGE  08/31/2022     IR GASTRO JEJUNOSTOMY TUBE CHANGE  12/21/2022     IR GASTRO JEJUNOSTOMY TUBE CHANGE  07/12/2023     IR GASTRO JEJUNOSTOMY TUBE CHANGE  08/18/2023     IR GASTRO JEJUNOSTOMY TUBE CHANGE  11/14/2023     IR GASTRO JEJUNOSTOMY TUBE PLACEMENT  07/27/2022     IR THORACENTESIS  08/29/2022     LEEP TX, CERVICAL  04/07/2017    HECTOR III     LYMPH NODE BIOPSY Left 2005    Left axilla, benign- [place]     MIDLINE INSERTION - DOUBLE LUMEN Left 07/28/2022    20cm, Basilic vein     REPLACE GASTROJEJUNOSTOMY TUBE, PERCUTANEOUS  10/07/2022    Procedure: Replace Gastrojejunostomy Tube;  Surgeon: Cosmo Arroyo MD;  [facility]: [facility]     THORACENTESIS Left 08/29/2022    Procedure: THORACENTESIS;  Surgeon: Bo Capone PA-C;  Location: [facility] OR     THORACENTESIS Left 09/13/2022    Procedure: Thoracentesis;  Surgeon: Jose R Mccullough MD;  Location: [facility]     THROMBECTOMY UPPER EXTREMITY Left 07/02/2022    Procedure: LEFT RADIAL ARM THROMBECTOMY;   Surgeon: Christie Graham MD;  [facility]     TRANSPLANT LUNG RECIPIENT SINGLE X2 Bilateral 06/28/2022    Procedure: Clamshell Incision, Bilateral Sequential Lung Transplant, On Cardiopulmonary Bypass, Flexible Bronchoscopy;  Surgeon: Sue Sunshine MD;  Location:  OR     Social and Family History:     Social History     Socioeconomic History     Marital status:      Spouse name: Not on file     Number of children: Not on file     Years of education: Not on file     Highest education level: Not on file   Occupational History     Not on file   Tobacco Use     Smoking status: Former     Packs/day: 0.50     Years: 30.00     Additional pack years: 0.00     Total pack years: 15.00     Types: Cigarettes     Quit date: 11/11/2020     Years since quitting: 3.2     Smokeless tobacco: Never   Substance and Sexual Activity     Alcohol use: Not Currently     Comment: Stopped drinking in 2020     Drug use: Not Currently     Types: Marijuana, Methamphetamines     Comment: hx:marijuana and methamphetamine-quit both unsure ?  2-3 yrs ago     Sexual activity: Not on file   Other Topics Concern     Parent/sibling w/ CABG, MI or angioplasty before 65F 55M? Not Asked   Social History Narrative     Not on file     Social Determinants of Health     Financial Resource Strain: Not on file   Food Insecurity: Not on file   Transportation Needs: Not on file   Physical Activity: Not on file   Stress: Not on file   Social Connections: Not on file   Interpersonal Safety: Not At Risk (9/1/2023)    Humiliation, Afraid, Rape, and Kick questionnaire      Fear of Current or Ex-Partner: No      Emotionally Abused: No      Physically Abused: No      Sexually Abused: No   Housing Stability: Not on file     No family history on file.  Allergies and Home Medications:     Allergies   Allergen Reactions     Blood Transfusion Related (Informational Only) Other (See Comments)     Patient has a history of a clinically significant  antibody against RBC antigens.  A delay in compatible RBCs may occur.      No Clinical Screening - See Comments Other (See Comments)     Patient has a history of a clinically significant antibody against RBC antigens.  A delay in compatible RBCs may occur.     Azithromycin Rash     12/1/22: Developed a rash that is not raised and looks diffuse in nature. It started in the groin and up the back and has now worked its way up her chest into her face. Pt states that it has now started to itch. She is breathing and talking normally and denies any airway changes. Unclear what started rash. Pt noted feeling somewhat itchy yesterday.     Ganciclovir Rash     12/1/22: Developed a rash that is not raised and looks diffuse in nature. It started in the groin and up the back and has now worked its way up her chest into her face. Pt states that it has now started to itch. She is breathing and talking normally and denies any airway changes. Unclear what started rash. Pt noted feeling somewhat itchy yesterday.     Medications Prior to Admission   Medication Sig Dispense Refill Last Dose     acetaminophen (TYLENOL) 500 MG tablet 500-1,000 mg by Per J Tube route 3 times daily as needed for mild pain   2/10/2024     B Complex-C-Folic Acid (DIALYVITE) TABS 1 tablet by Per J Tube route every morning   2/10/2024     Calcium Carbonate-Vitamin D 600-10 MG-MCG TABS 1 tablet by Per J Tube route 3 times daily 90 tablet 11 2/10/2024     dapsone 2 mg/mL SUSP 25 mLs (50 mg) by Per J Tube route Every Mon, Wed, Fri Morning 1000 mL 11 2/9/2024     furosemide (LASIX) 40 MG tablet 40 mg by Per J Tube route 2 times daily   2/10/2024 at 0930     loperamide (IMODIUM A-D) 2 MG tablet 1 tablet (2 mg) by Per J Tube route 4 times daily as needed for diarrhea   2/10/2024     metoprolol tartrate (LOPRESSOR) 50 MG tablet 0.5 tablets (25 mg) by Per Feeding Tube route 2 times daily 60 tablet 11 2/10/2024 at 0930     multivitamin (CENTRUM SILVER) tablet Take 1  tablet by mouth daily   2/10/2024 at 0930     pantoprazole (PROTONIX) 2 mg/mL SUSP suspension 20 mLs (40 mg) by Per J Tube route 2 times daily 1200 mL 11 2/10/2024 at 0930     predniSONE (DELTASONE) 5 MG tablet Take 1 tablet (5 mg) by mouth every morning AND 0.5 tablets (2.5 mg) every evening. Take 1 tab (5mg) at AM and 1/2 tab (2.5) in pm (Patient taking differently: Take 1 tablet (5 mg) by J-tube every morning AND 0.5 tablets (2.5 mg) every evening. Take 1 tab (5mg) at AM and 1/2 tab (2.5) in pm) 135 tablet 3 2/10/2024 at 0930     protein modular (PROSOURCE TF) LIQD 1 packet by Per Feeding Tube route daily (Patient taking differently: 1 packet by Per Feeding Tube route daily at 2 pm) 30 packet 11 2/9/2024     sevelamer carbonate, RENVELA, 0.8 GM PACK Packet Take 1 packet (0.8 g) by mouth 3 times daily (with meals) (Patient taking differently: 0.8 g by Per J Tube route 3 times daily (with meals))   Past Week     tacrolimus (GENERIC) 1 mg/mL suspension Take 4 mLs (4 mg) by mouth every morning AND 4 mLs (4 mg) every evening. (Patient taking differently: Take 4 mLs (4 mg) by j-tube every morning AND 4 mLs (4 mg) every evening.)   2/10/2024 at 0930     vitamin B-12 (CYANOCOBALAMIN) 500 MCG tablet 1 tablet (500 mcg) by Per Feeding Tube route daily 90 tablet 3 2/10/2024     Current Scheduled Meds    calcium carbonate-vitamin D  1 tablet Per J Tube TID w/meals     cyanocobalamin  500 mcg Per Feeding Tube Daily     dapsone  50 mg Per J Tube Q Mon Wed Fri AM     epoetin tre-epbx  4,000 Units Intravenous Once in dialysis/CRRT     heparin ANTICOAGULANT  5,000 Units Subcutaneous Q12H     iron sucrose  50 mg Intravenous Once in dialysis/CRRT     lipids plant base  250 mL Intravenous Q24H     metoprolol  25 mg Per J Tube BID     multivitamins w/minerals  15 mL Per Feeding Tube Daily     - MEDICATION INSTRUCTIONS -   Does not apply Once     pantoprazole  40 mg Per J Tube BID     predniSONE  5 mg Per J Tube QAM    And      predniSONE  2.5 mg Per J Tube QPM     sevelamer carbonate (RENVELA)  0.8 g Oral BID     sodium chloride 0.9%  250 mL Intravenous Once in dialysis/CRRT     sodium chloride 0.9%  300 mL Hemodialysis Machine Once     tacrolimus  4 mg Per J Tube QAM    And     tacrolimus  4.5 mg Per J Tube QPM      Current PRN Meds  acetaminophen, calcium carbonate, dextrose, lidocaine 4%, lidocaine (buffered or not buffered), lidocaine (buffered or not buffered), lidocaine (buffered or not buffered), loperamide, ondansetron **OR** ondansetron, senna-docusate **OR** senna-docusate, sodium chloride (PF), sodium chloride 0.9%, sodium chloride 0.9%, - MEDICATION INSTRUCTIONS -     Physical Exam:     All notes, images, and labs from past 24 hours (at minimum) were reviewed.    Vital signs:  Temp: 98.4  F (36.9  C) Temp src: Oral BP: 124/74 Pulse: 70   Resp: 18 SpO2: 94 % O2 Device: None (Room air)     Weight: 43.4 kg (95 lb 9.6 oz)    Intake/Output Summary (Last 24 hours) at 2/13/2024 1849  Last data filed at 2/13/2024 1200  Gross per 24 hour   Intake 0 ml   Output 1000 ml   Net -1000 ml     Constitutional: Lying in bed, in no apparent distress  HEENT: Eyes with pink conjunctivae, anicteric, significant bruising around right eye, right cheek, and into chin and neck region  PULM: Moderate airflow, scattered basilar crackles, normal work of breathing  CV: Normal S1 and S2.  RRR. + murmur  ABD: Normal bowel sounds, soft, non tender   MSK: Moves all extremities. Some wasting of arms with edema of lower legs  NEURO: Alert and conversant  SKIN: Warm, dry.  No rash on limited exam  PSYCH: Mood stable    Results:     LABS    CMP:   Recent Labs   Lab 02/13/24  0700 02/13/24  0618 02/13/24  0408 02/13/24  0002 02/12/24  1845 02/12/24  0756 02/11/24  0852 02/07/24  1338   NA  --   --   --   --   --  144  --  141   POTASSIUM  --   --   --   --   --  4.0 3.8 3.6   CHLORIDE  --   --   --   --   --  107  --  106   CO2  --   --   --   --   --  26  --  26  "  ANIONGAP  --   --   --   --   --  11  --  9   GLC  --  94 117* 129* 129* 177*  --  145*   BUN  --   --   --   --   --  27.1*  --  22.9   CR  --   --   --   --   --  4.39*  --  3.89*   GFRESTIMATED  --   --   --   --   --  11*  --  12*   ESTUARDO  --   --   --   --   --  8.9  --  8.7*   MAG 2.4*  --   --   --   --  2.4* 2.2  --    PHOS 4.7*  --   --   --   --  3.5 2.9  --    PROTTOTAL  --   --   --   --   --  5.9*  --  5.8*   ALBUMIN  --   --   --   --   --  3.6  --  3.5   BILITOTAL  --   --   --   --   --  0.3  --  0.3   ALKPHOS  --   --   --   --   --  67  --  67   AST  --   --   --   --   --  17  --  16   ALT  --   --   --   --   --  7  --  7     CBC:   Recent Labs   Lab 02/13/24  0700 02/12/24  0756 02/07/24  1338   WBC 4.5 5.3 4.6   RBC 2.27* 2.56* 2.47*   HGB 9.1* 9.2* 8.9*   HCT 28.1* 30.9* 29.6*   * 121* 120*   MCH 40.1* 35.9* 36.0*   MCHC 32.4 29.8* 30.1*   RDW 15.8* 15.4* 14.3    216 165       INR:   Recent Labs   Lab 02/13/24  0700   INR 0.97       Glucose:   Recent Labs   Lab 02/13/24  0618 02/13/24  0408 02/13/24  0002 02/12/24  1845 02/12/24  0756 02/07/24  1338   GLC 94 117* 129* 129* 177* 145*       Blood Gas: No lab results found in last 7 days.    Culture Data No results for input(s): \"CULT\" in the last 168 hours.    Virology Data:   Lab Results   Component Value Date    [facility] Invalid (Invalid) 07/12/2023    FLUAH3 Invalid (Invalid) 07/12/2023    [id number] Invalid (Invalid) 07/12/2023    IFLUB Invalid (Invalid) 07/12/2023    RSVA Invalid (Invalid) 07/12/2023    RSVB Invalid (Invalid) 07/12/2023    PIV1 Invalid (Invalid) 07/12/2023    PIV2 Invalid (Invalid) 07/12/2023    PIV3 Invalid (Invalid) 07/12/2023    HMPV Invalid (Invalid) 07/12/2023       Historical CMV results (last 3 of prior testing):  Lab Results   Component Value Date    CMVQNT Not Detected 02/07/2024    CMVQNT Not Detected 01/10/2024    CMVQNT Not Detected 07/25/2023     Lab Results   Component Value Date    CMVLOG 3.2 " 07/12/2023    CMVLOG <2.1 04/19/2023    CMVLOG 3.5 01/25/2023       Urine Studies    Recent Labs   Lab Test 05/18/23  0627 09/19/22  2254   URINEPH 5.0 7.0   NITRITE Negative Negative   LEUKEST Moderate* Large*   WBCU 21* 29*       Most Recent [place] Pulmonary Function Testing (FVC/FEV1 only)  FVC-Pre   Date Value Ref Range Status   02/07/2024 1.19 L    01/10/2024 1.12 L    08/29/2023 1.48 L    07/25/2023 1.55 L      FVC-%Pred-Pre   Date Value Ref Range Status   02/07/2024 42 %    01/10/2024 39 %    08/29/2023 53 %    07/25/2023 55 %      FEV1-Pre   Date Value Ref Range Status   02/07/2024 1.13 L    01/10/2024 1.10 L    08/29/2023 1.43 L    07/25/2023 1.54 L      FEV1-%Pred-Pre   Date Value Ref Range Status   02/07/2024 51 %    01/10/2024 49 %    08/29/2023 64 %    07/25/2023 69 %

## 2024-02-14 ENCOUNTER — APPOINTMENT (OUTPATIENT)
Dept: INTERVENTIONAL RADIOLOGY/VASCULAR | Facility: CLINIC | Age: 64
DRG: 640 | End: 2024-02-14
Payer: MEDICARE

## 2024-02-14 LAB
ALBUMIN SERPL BCG-MCNC: 3.2 G/DL (ref 3.5–5.2)
ALP SERPL-CCNC: 62 U/L (ref 40–150)
ALT SERPL W P-5'-P-CCNC: 8 U/L (ref 0–50)
ANION GAP SERPL CALCULATED.3IONS-SCNC: 10 MMOL/L (ref 7–15)
AST SERPL W P-5'-P-CCNC: 17 U/L (ref 0–45)
BASOPHILS # BLD AUTO: ABNORMAL 10*3/UL
BASOPHILS # BLD MANUAL: 0 10E3/UL (ref 0–0.2)
BASOPHILS NFR BLD AUTO: ABNORMAL %
BASOPHILS NFR BLD MANUAL: 0 %
BILIRUB SERPL-MCNC: 0.2 MG/DL
BUN SERPL-MCNC: 20.3 MG/DL (ref 8–23)
CALCIUM SERPL-MCNC: 8.4 MG/DL (ref 8.8–10.2)
CHLORIDE SERPL-SCNC: 101 MMOL/L (ref 98–107)
CREAT SERPL-MCNC: 3.33 MG/DL (ref 0.51–0.95)
DACRYOCYTES BLD QL SMEAR: SLIGHT
DEPRECATED HCO3 PLAS-SCNC: 26 MMOL/L (ref 22–29)
EGFRCR SERPLBLD CKD-EPI 2021: 15 ML/MIN/1.73M2
EOSINOPHIL # BLD AUTO: ABNORMAL 10*3/UL
EOSINOPHIL # BLD MANUAL: 0.1 10E3/UL (ref 0–0.7)
EOSINOPHIL NFR BLD AUTO: ABNORMAL %
EOSINOPHIL NFR BLD MANUAL: 3 %
ERYTHROCYTE [DISTWIDTH] IN BLOOD BY AUTOMATED COUNT: 15.5 % (ref 10–15)
FOLATE SERPL-MCNC: 19.6 NG/ML (ref 4.6–34.8)
GLUCOSE BLDC GLUCOMTR-MCNC: 104 MG/DL (ref 70–99)
GLUCOSE BLDC GLUCOMTR-MCNC: 104 MG/DL (ref 70–99)
GLUCOSE BLDC GLUCOMTR-MCNC: 131 MG/DL (ref 70–99)
GLUCOSE BLDC GLUCOMTR-MCNC: 137 MG/DL (ref 70–99)
GLUCOSE SERPL-MCNC: 98 MG/DL (ref 70–99)
HCT VFR BLD AUTO: 27.5 % (ref 35–47)
HGB BLD-MCNC: 8.2 G/DL (ref 11.7–15.7)
IMM GRANULOCYTES # BLD: ABNORMAL 10*3/UL
IMM GRANULOCYTES NFR BLD: ABNORMAL %
LYMPHOCYTES # BLD AUTO: ABNORMAL 10*3/UL
LYMPHOCYTES # BLD MANUAL: 1.2 10E3/UL (ref 0.8–5.3)
LYMPHOCYTES NFR BLD AUTO: ABNORMAL %
LYMPHOCYTES NFR BLD MANUAL: 26 %
MAGNESIUM SERPL-MCNC: 1.9 MG/DL (ref 1.7–2.3)
MCH RBC QN AUTO: 36.8 PG (ref 26.5–33)
MCHC RBC AUTO-ENTMCNC: 29.8 G/DL (ref 31.5–36.5)
MCV RBC AUTO: 123 FL (ref 78–100)
MONOCYTES # BLD AUTO: ABNORMAL 10*3/UL
MONOCYTES # BLD MANUAL: 0.5 10E3/UL (ref 0–1.3)
MONOCYTES NFR BLD AUTO: ABNORMAL %
MONOCYTES NFR BLD MANUAL: 11 %
NEUTROPHILS # BLD AUTO: ABNORMAL 10*3/UL
NEUTROPHILS # BLD MANUAL: 2.7 10E3/UL (ref 1.6–8.3)
NEUTROPHILS NFR BLD AUTO: ABNORMAL %
NEUTROPHILS NFR BLD MANUAL: 60 %
NRBC # BLD AUTO: 0 10E3/UL
NRBC BLD AUTO-RTO: 0 /100
PHOSPHATE SERPL-MCNC: 3.3 MG/DL (ref 2.5–4.5)
PLAT MORPH BLD: ABNORMAL
PLATELET # BLD AUTO: 175 10E3/UL (ref 150–450)
POLYCHROMASIA BLD QL SMEAR: SLIGHT
POTASSIUM SERPL-SCNC: 4 MMOL/L (ref 3.4–5.3)
PROT SERPL-MCNC: 5.4 G/DL (ref 6.4–8.3)
RBC # BLD AUTO: 2.23 10E6/UL (ref 3.8–5.2)
RBC MORPH BLD: ABNORMAL
SODIUM SERPL-SCNC: 137 MMOL/L (ref 135–145)
STOMATOCYTES BLD QL SMEAR: SLIGHT
VIT B12 SERPL-MCNC: 1930 PG/ML (ref 232–1245)
WBC # BLD AUTO: 4.5 10E3/UL (ref 4–11)

## 2024-02-14 PROCEDURE — 80053 COMPREHEN METABOLIC PANEL: CPT | Performed by: INTERNAL MEDICINE

## 2024-02-14 PROCEDURE — 36558 INSERT TUNNELED CV CATH: CPT | Performed by: STUDENT IN AN ORGANIZED HEALTH CARE EDUCATION/TRAINING PROGRAM

## 2024-02-14 PROCEDURE — 36415 COLL VENOUS BLD VENIPUNCTURE: CPT | Performed by: INTERNAL MEDICINE

## 2024-02-14 PROCEDURE — 250N000013 HC RX MED GY IP 250 OP 250 PS 637: Performed by: INTERNAL MEDICINE

## 2024-02-14 PROCEDURE — 85027 COMPLETE CBC AUTOMATED: CPT | Performed by: INTERNAL MEDICINE

## 2024-02-14 PROCEDURE — 250N000011 HC RX IP 250 OP 636: Performed by: PHYSICIAN ASSISTANT

## 2024-02-14 PROCEDURE — B4185 PARENTERAL SOL 10 GM LIPIDS: HCPCS | Mod: JZ | Performed by: INTERNAL MEDICINE

## 2024-02-14 PROCEDURE — 77001 FLUOROGUIDE FOR VEIN DEVICE: CPT

## 2024-02-14 PROCEDURE — 250N000009 HC RX 250: Performed by: INTERNAL MEDICINE

## 2024-02-14 PROCEDURE — 0JH63XZ INSERTION OF TUNNELED VASCULAR ACCESS DEVICE INTO CHEST SUBCUTANEOUS TISSUE AND FASCIA, PERCUTANEOUS APPROACH: ICD-10-PCS | Performed by: STUDENT IN AN ORGANIZED HEALTH CARE EDUCATION/TRAINING PROGRAM

## 2024-02-14 PROCEDURE — 02HV33Z INSERTION OF INFUSION DEVICE INTO SUPERIOR VENA CAVA, PERCUTANEOUS APPROACH: ICD-10-PCS | Performed by: STUDENT IN AN ORGANIZED HEALTH CARE EDUCATION/TRAINING PROGRAM

## 2024-02-14 PROCEDURE — 3E0436Z INTRODUCTION OF NUTRITIONAL SUBSTANCE INTO CENTRAL VEIN, PERCUTANEOUS APPROACH: ICD-10-PCS | Performed by: INTERNAL MEDICINE

## 2024-02-14 PROCEDURE — 76937 US GUIDE VASCULAR ACCESS: CPT | Mod: 26 | Performed by: STUDENT IN AN ORGANIZED HEALTH CARE EDUCATION/TRAINING PROGRAM

## 2024-02-14 PROCEDURE — 99233 SBSQ HOSP IP/OBS HIGH 50: CPT | Performed by: INTERNAL MEDICINE

## 2024-02-14 PROCEDURE — C1751 CATH, INF, PER/CENT/MIDLINE: HCPCS

## 2024-02-14 PROCEDURE — 250N000009 HC RX 250: Performed by: PHYSICIAN ASSISTANT

## 2024-02-14 PROCEDURE — 82746 ASSAY OF FOLIC ACID SERUM: CPT | Performed by: INTERNAL MEDICINE

## 2024-02-14 PROCEDURE — 83735 ASSAY OF MAGNESIUM: CPT | Performed by: INTERNAL MEDICINE

## 2024-02-14 PROCEDURE — 250N000012 HC RX MED GY IP 250 OP 636 PS 637: Performed by: INTERNAL MEDICINE

## 2024-02-14 PROCEDURE — 85007 BL SMEAR W/DIFF WBC COUNT: CPT | Performed by: INTERNAL MEDICINE

## 2024-02-14 PROCEDURE — 82607 VITAMIN B-12: CPT | Performed by: INTERNAL MEDICINE

## 2024-02-14 PROCEDURE — C1769 GUIDE WIRE: HCPCS

## 2024-02-14 PROCEDURE — 99152 MOD SED SAME PHYS/QHP 5/>YRS: CPT

## 2024-02-14 PROCEDURE — 77001 FLUOROGUIDE FOR VEIN DEVICE: CPT | Mod: 26 | Performed by: STUDENT IN AN ORGANIZED HEALTH CARE EDUCATION/TRAINING PROGRAM

## 2024-02-14 PROCEDURE — 120N000002 HC R&B MED SURG/OB UMMC

## 2024-02-14 PROCEDURE — 250N000011 HC RX IP 250 OP 636

## 2024-02-14 PROCEDURE — 250N000011 HC RX IP 250 OP 636: Performed by: INTERNAL MEDICINE

## 2024-02-14 PROCEDURE — 272N000504 HC NEEDLE CR4

## 2024-02-14 PROCEDURE — 84100 ASSAY OF PHOSPHORUS: CPT | Performed by: INTERNAL MEDICINE

## 2024-02-14 PROCEDURE — 250N000011 HC RX IP 250 OP 636: Performed by: STUDENT IN AN ORGANIZED HEALTH CARE EDUCATION/TRAINING PROGRAM

## 2024-02-14 RX ORDER — HEPARIN SODIUM 5000 [USP'U]/.5ML
5000 INJECTION, SOLUTION INTRAVENOUS; SUBCUTANEOUS EVERY 12 HOURS
Status: DISCONTINUED | OUTPATIENT
Start: 2024-02-15 | End: 2024-03-21

## 2024-02-14 RX ORDER — DEXTROSE MONOHYDRATE 100 MG/ML
INJECTION, SOLUTION INTRAVENOUS CONTINUOUS PRN
Status: CANCELLED | OUTPATIENT
Start: 2024-02-14

## 2024-02-14 RX ORDER — NALOXONE HYDROCHLORIDE 0.4 MG/ML
0.2 INJECTION, SOLUTION INTRAMUSCULAR; INTRAVENOUS; SUBCUTANEOUS
Status: DISCONTINUED | OUTPATIENT
Start: 2024-02-14 | End: 2024-02-14

## 2024-02-14 RX ORDER — FENTANYL CITRATE 50 UG/ML
25-50 INJECTION, SOLUTION INTRAMUSCULAR; INTRAVENOUS EVERY 5 MIN PRN
Status: DISCONTINUED | OUTPATIENT
Start: 2024-02-14 | End: 2024-02-14

## 2024-02-14 RX ORDER — FLUMAZENIL 0.1 MG/ML
0.2 INJECTION, SOLUTION INTRAVENOUS
Status: DISCONTINUED | OUTPATIENT
Start: 2024-02-14 | End: 2024-02-14

## 2024-02-14 RX ORDER — NALOXONE HYDROCHLORIDE 0.4 MG/ML
0.4 INJECTION, SOLUTION INTRAMUSCULAR; INTRAVENOUS; SUBCUTANEOUS
Status: DISCONTINUED | OUTPATIENT
Start: 2024-02-14 | End: 2024-02-14

## 2024-02-14 RX ORDER — HEPARIN SODIUM,PORCINE 10 UNIT/ML
1-5 VIAL (ML) INTRAVENOUS ONCE
Status: DISCONTINUED | OUTPATIENT
Start: 2024-02-14 | End: 2024-02-14

## 2024-02-14 RX ADMIN — CALCIUM CARBONATE 600 MG (1,500 MG)-VITAMIN D3 400 UNIT TABLET 1 TABLET: at 09:09

## 2024-02-14 RX ADMIN — CEFAZOLIN SODIUM 2 G: 2 INJECTION, SOLUTION INTRAVENOUS at 15:29

## 2024-02-14 RX ADMIN — TACROLIMUS 4.5 MG: 5 CAPSULE ORAL at 18:20

## 2024-02-14 RX ADMIN — METOPROLOL TARTRATE 25 MG: 100 TABLET, FILM COATED ORAL at 21:02

## 2024-02-14 RX ADMIN — LIDOCAINE HYDROCHLORIDE 8.5 ML: 10 INJECTION, SOLUTION EPIDURAL; INFILTRATION; INTRACAUDAL; PERINEURAL at 15:46

## 2024-02-14 RX ADMIN — CALCIUM CARBONATE 600 MG (1,500 MG)-VITAMIN D3 400 UNIT TABLET 1 TABLET: at 18:18

## 2024-02-14 RX ADMIN — MIDAZOLAM 1 MG: 1 INJECTION INTRAMUSCULAR; INTRAVENOUS at 15:32

## 2024-02-14 RX ADMIN — PREDNISONE 5 MG: 5 TABLET ORAL at 09:09

## 2024-02-14 RX ADMIN — ACETAMINOPHEN 650 MG: 325 SOLUTION ORAL at 18:17

## 2024-02-14 RX ADMIN — Medication 40 MG: at 21:02

## 2024-02-14 RX ADMIN — METOPROLOL TARTRATE 25 MG: 100 TABLET, FILM COATED ORAL at 09:10

## 2024-02-14 RX ADMIN — HEPARIN, PORCINE (PF) 10 UNIT/ML INTRAVENOUS SYRINGE 2 ML: at 15:53

## 2024-02-14 RX ADMIN — ASCORBIC ACID, VITAMIN A PALMITATE, CHOLECALCIFEROL, THIAMINE HYDROCHLORIDE, RIBOFLAVIN-5 PHOSPHATE SODIUM, PYRIDOXINE HYDROCHLORIDE, NIACINAMIDE, DEXPANTHENOL, ALPHA-TOCOPHEROL ACETATE, VITAMIN K1, FOLIC ACID, BIOTIN, CYANOCOBALAMIN: 200; 3300; 200; 6; 3.6; 6; 40; 15; 10; 150; 600; 60; 5 INJECTION, SOLUTION INTRAVENOUS at 20:22

## 2024-02-14 RX ADMIN — TACROLIMUS 4 MG: 5 CAPSULE ORAL at 09:10

## 2024-02-14 RX ADMIN — ONDANSETRON 4 MG: 4 TABLET, ORALLY DISINTEGRATING ORAL at 18:17

## 2024-02-14 RX ADMIN — SEVELAMER CARBONATE 0.8 G: 800 POWDER, FOR SUSPENSION ORAL at 20:27

## 2024-02-14 RX ADMIN — OLIVE OIL AND SOYBEAN OIL 250 ML: 16; 4 INJECTION, EMULSION INTRAVENOUS at 20:22

## 2024-02-14 RX ADMIN — CYANOCOBALAMIN TAB 500 MCG 500 MCG: 500 TAB at 09:09

## 2024-02-14 RX ADMIN — Medication 15 ML: at 09:11

## 2024-02-14 RX ADMIN — PREDNISONE 2.5 MG: 2.5 TABLET ORAL at 20:22

## 2024-02-14 RX ADMIN — DAPSONE 50 MG: 100 TABLET ORAL at 09:10

## 2024-02-14 RX ADMIN — Medication 40 MG: at 09:11

## 2024-02-14 ASSESSMENT — ACTIVITIES OF DAILY LIVING (ADL)
ADLS_ACUITY_SCORE: 27
ADLS_ACUITY_SCORE: 26

## 2024-02-14 NOTE — PROGRESS NOTES
[facility]    Medicine Progress Note - Hospitalist Service, GOLD TEAM 19    Date of Admission:  2/10/2024    Assessment & Plan   Sofie Rodriguez is a 63 year old female admitted on 2/10/2024.  She has a history of bilateral lung transplant, ESRD on HD (T/Th/Sat), gastroparesis s/p PEG/J, malnutrition, recent right femoral fracture s/p ORIF in [facility] presenting as a direct admit from home to the hospital due to concerns of failure to thrive and small bowel dysmotility.  Of note, patient recently had a mechanical fall at home about 2 days prior to this admission.    # Adult failure to thrive, weakness, deconditioning with falls  # Unintentional 40 pound weight loss  # Gastroparesis  # Small bowel dysmotility  # S/P GJ feeding tube  Patient lives with her daughter.  Patient had lost about 30% of body weight over the last year.  Documented weights this admission 95 lbs, in January 2023, weight was 136 lbs. Also with recent falls including right facial contusions/ecchymoses prior to this admission.  Has had previous extensive GI assessments demonstrating gastroparesis and small bowel dysmotility.  Has had excellent appetite, but ongoing early satiety and intolerance of tube feeds and oral intake due to early satiety, bloating and nausea and discomfort.  Also chronic loose stools, felt osmotic and consistently worse with increased enteral nutritional intake.  Was admitted from home for the purpose of obtaining Port-A-Cath and initiating long-term TPN to supplement enteral intake.  Also has ESRD, on dialysis, and status post lung transplant.  Gastroenterology consulted, with recommendations to consider trickle feeding via J-tube at 5 cc/h to maintain bowel integrity.  -RD, nutrition pharmacy consulted, initiated on peripheral parenteral nutrition with plans to transition to TPN.  Port-A-Cath placed 2/14 by IR.  -Per nephrology, would not restrict fluid beyond 1.5  L/day  -Monitor for refeeding syndrome with initiation of TPN, monitor daily CMP     # Hypertension  - Blood pressure appears at goal  - Continue PTA antihypertensive regimen     # End-stage renal disease on hemodialysis  - Dialysis every Tuesday, Thursday, Saturday per nephrology     # Status post lung transplant  - Appreciate pulmonary transplant consultation  - Continue PTA immunosuppressive agent: Prednisone 5 mg every morning, 2.5 mg every afternoon, tacrolimus 4 mg every morning, 4 mg every afternoon  - Timed tacrolimus level ordered for 2/15  -Continue PTA dapsone for PJP prophylaxis    # GERD, GI Ppx  - continue PTA Protonix    # Right hip fracture s/p ORIF (December 2023), weakness, gait instability with recurrent falls, recent right facial trauma, contusions following a fall  Patient had right femur fracture in December 2023.  Underwent ORIF at OSH.  Had another mechanical fall 2 days PTA with right hip pain and extensive right facial ecchymoses.  Ecchymoses fading.  Pain improving.  X-ray right hip obtained this admission showed no evidence of acute displaced fracture.  -PT, OT    # Macrocytic anemia:  Chronic.  Multifactorial including ACD, ESRD.  No bleeding recently clinically aside from facial ecchymoses.  LFTs normal, TSH normal 2022.  -Epogen per nephrology  -B12, folic acid results pending    #DVT PPX:  -Subcu heparin    #Lymphedema:  Secondary to ESRD, protein malnutrition.  Stable.  -Tubigrip's ordered  -Volume management at dialysis per nephrology          Diet: parenteral nutrition - Clinimix E  NPO per Anesthesia Guidelines for Procedure/Surgery Except for: Meds  parenteral nutrition - ADULT compounded formula    DVT Prophylaxis: Heparin SQ  Dong Catheter: Not present  Lines: PRESENT             Cardiac Monitoring: None  Code Status: Full Code      Clinically Significant Risk Factors              # Hypoalbuminemia: Lowest albumin = 3.1 g/dL at 2/13/2024  7:00 AM, will monitor as appropriate    "          # Severe Malnutrition: based on nutrition assessment, PRESENT ON ADMISSION     # Financial/Environmental Concerns: none         Disposition Plan      Expected Discharge Date: 02/15/2024      Destination: home with family;home with help/services              Jesús Lopez MD  Hospitalist Service, GOLD TEAM 19  [facility]  Securely message with [employer] (more info)  Text page via [facility] Paging/Directory   See signed in provider for up to date coverage information  ______________________________________________________________________    Interval History   Patient states that she is \"hungry\", currently n.p.o. awaiting tunneled catheter.  No bowel movements today since she has been NPO.  Getting TPN via peripheral IV.  Notes her lower extremity edema seems improved.  Right facial pain, ecchymoses improving.  Denies any headaches.  No cough or dyspnea.    Physical Exam   Vital Signs: Temp: 98.9  F (37.2  C) Temp src: Oral BP: (!) 150/76 Pulse: 81   Resp: 21 SpO2: 100 % O2 Device: Nasal cannula Oxygen Delivery: 2 LPM  Weight: 101 lbs 12.8 oz  General: Alert, oriented x 4.  Very pleasant, appropriate.  Appears cachectic but comfortable.  HEENT: Diffuse right facial and upper neck ecchymoses, fading.  Small hematoma on the right lid.  OP moist and clear.  Chest: Velcro rales in the right lower lung fields, left lower and left midlung fields, unchanged.  No wheezes.  No rhonchi.  CV: RRR.  1-2/6 systolic murmur at LLSB  Abdomen: NABS.  Thin.  Soft, nontender, nondistended.  GJ tube site clean.  Extremities: 1-2+ BLE edema.  1+ left upper extremity edema.  Left upper extremity fistula palpable.  Neuro: Generally weak but nonfocal.    55 MINUTES SPENT BY ME on the date of service doing chart review, history, exam, documentation & further activities per the note.      Data      CMP  Recent Labs   Lab 02/14/24  0807 02/14/24  0607 02/14/24  0030 02/13/24  1656 " 02/13/24  1226 02/13/24  0700 02/12/24  1845 02/12/24  0756 02/11/24  0852   0000     --   --   --   --  140  --  144  --   --    POTASSIUM 4.0  --   --   --   --  5.0  --  4.0 3.8  --    CHLORIDE 101  --   --   --   --  104  --  107  --   --    CO2 26  --   --   --   --  23  --  26  --   --    ANIONGAP 10  --   --   --   --  13  --  11  --   --    GLC 98 104* 131* 149*   < > 100*   < > 177*  --    < >   BUN 20.3  --   --   --   --  33.4*  --  27.1*  --   --    CR 3.33*  --   --   --   --  5.41*  --  4.39*  --   --    GFRESTIMATED 15*  --   --   --   --  8*  --  11*  --   --    ESTUARDO 8.4*  --   --   --   --  8.4*  --  8.9  --   --    MAG 1.9  --   --   --   --  2.4*  --  2.4* 2.2  --    PHOS 3.3  --   --   --   --  4.7*  --  3.5 2.9  --    PROTTOTAL 5.4*  --   --   --   --  5.5*  --  5.9*  --   --    ALBUMIN 3.2*  --   --   --   --  3.1*  --  3.6  --   --    BILITOTAL 0.2  --   --   --   --  0.2  --  0.3  --   --    ALKPHOS 62  --   --   --   --  56  --  67  --   --    AST 17  --   --   --   --  31  --  17  --   --    ALT 8  --   --   --   --  8  --  7  --   --     < > = values in this interval not displayed.     CBC  Recent Labs   Lab 02/14/24  0807 02/13/24  0700 02/12/24  0756   WBC 4.5 4.5 5.3   RBC 2.23* 2.27* 2.56*   HGB 8.2* 9.1* 9.2*   HCT 27.5* 28.1* 30.9*   * 124* 121*   MCH 36.8* 40.1* 35.9*   MCHC 29.8* 32.4 29.8*   RDW 15.5* 15.8* 15.4*    191 216     INR  Recent Labs   Lab 02/13/24  1658 02/13/24  0700   INR 0.92 0.97     Arterial Blood GasNo lab results found in last 7 days.Venous Blood Gas  No lab results found in last 7 days.  Hemoglobin A1C   Date Value Ref Range Status   07/11/2023 <4.2 <5.7 % Final     Comment:     Normal <5.7%   Prediabetes 5.7-6.4%    Diabetes 6.5% or higher     Note: Adopted from ADA consensus guidelines.   11/11/2022 5.2 <5.7 % Final     Comment:     Normal <5.7%   Prediabetes 5.7-6.4%    Diabetes 6.5% or higher     Note: Adopted from ADA consensus  guidelines.   06/28/2022 5.8 (H) <5.7 % Final     Comment:     Normal <5.7%   Prediabetes 5.7-6.4%    Diabetes 6.5% or higher     Note: Adopted from ADA consensus guidelines.   11/13/2020 5.1 <5.7 % Final     Results for orders placed or performed during the hospital encounter of 02/10/24   XR Hip Right 2-3 Views    Narrative    EXAM: XR HIP RIGHT 2-3 VIEWS  LOCATION: [facility]  DATE: 2/12/2024    INDICATION: Recent right hip ORIF, also experienced recent fall. Complaining of pain.  COMPARISON: 06/14/2021.      Impression    IMPRESSION: Postop changes fixation right proximal femur securing a fracture. Healing is incomplete. Mild underlying degenerative changes. Alignment is near-anatomic. No evidence of an acute displaced fracture elsewhere throughout the right hip.   Partially visible gastrojejunostomy.     *Note: Due to a large number of results and/or encounters for the requested time period, some results have not been displayed. A complete set of results can be found in Results Review.

## 2024-02-14 NOTE — PLAN OF CARE
Pt is A/O x4, ind in her room. Bruising on R side of her face and neck, according to pt it was due to a fall prior to admission, R shin skin tear. R PIV, mag and phosphorus RN manage. Pt is on dialysis, pt became NPO at midnight due to schedule port placement today. LBM 2/14, continue with POC.

## 2024-02-14 NOTE — PROGRESS NOTES
"  Nephrology Progress Note  02/14/2024         Assessment & Recommendations:   Sofie Rodriguez is a 63 year old year old female    64yo F with ESKD, lung transplant in 6/28/22, and gastroparesis admitted with failure to thrive and unintentional weight loss. She also reports LE edema and L arm edema at the elbow. She reports feeling \"sick\" whenever she has tube feeds or oral food intake. She has nausea but no vomiting. Neph is consulted for HD.     # ESKD - TTS, LUE AVG, 3hr, 45.5kg, [facility], Dr. Elias. She has been losing weight and now even below her recent set EDW.  - HD tomorrow per TTS for 3 hrs and plan 1-2 L remoal  - Renal panel on TTS  - Using Lidocaine cream prior  # Edema due to third spacing due to hypoalbuminemia  - Please wrap and elevate her legs  # FTT  # Chronic diarrhea  Working-up currently. Not on any cellcept. Team is planning TPN.  - Please limit fluid < 1.5 L per day for TPN  - Now on TPN  - PLEASE chart amount of TPN in the I/O  #Hypertension - BP on low side. Noted Furosemide at home but she does not urinate so ok to discontinue.   On metoprolol solution 25 mg twice a day  # Anemia 2/2 ESKD  On Venofer 50 qwk, Mircera last dose 1/9. Hb 8-9.  - Will continue Venofer 50 mcg q week (Tuesday)  - Started Epogen 4000 U with run while she is admitted; 1st dose 2/13/24  # BMD   Phos are normal to low.   - On home sevelamer 800 mg TID.   - Reduced Sevelamer to 800 mg BID since 2/12/24     Recommendations were communicated to primary team via  note     Ravin Johnston MD   Division of Renal Disease and Hypertension  [employer]  Vocera Web Console    Interval History :   Nursing and provider notes from last 24 hours reviewed.  In the last 24 hours Sofie Rodriguez has no acute event. HD yesterday and 1 L removed. Continued to have diarrhea. TPN started peripherally yesterday.    Review of Systems:   I reviewed the following systems:  10 system are negative except as mentioned above    Physical " Exam:   I/O last 3 completed shifts:  In: 0   Out: 1000 [Other:1000]   /68 (BP Location: Right arm)   Pulse 83   Temp 98.9  F (37.2  C) (Oral)   Resp 16   Wt 46.2 kg (101 lb 12.8 oz)   SpO2 91%   BMI 18.03 kg/m       GENERAL APPEARANCE: Alert, NAD, pleasant  EYES:  No scleral icterus, pupils equal; + vinayak orbital and facial ecchymosis  PULM: lungs clear to auscultation bilaterally, equal air movement, no clubbing  CV: regular rhythm, normal rate, no rub     -edema Trace to 1+ edema   GI: soft, non tender, no distended, bowel sounds are present  INTEGUMENT: no cyanosis, no rash  NEURO:  no asterixis   Access Left AVG: + Bruit and thrill    Labs:   All labs reviewed by me  Electrolytes/Renal -   Recent Labs   Lab Test 02/14/24  0807 02/14/24  0607 02/14/24  0030 02/13/24  1226 02/13/24  0700 02/12/24  1845 02/12/24  0756     --   --   --  140  --  144   POTASSIUM 4.0  --   --   --  5.0  --  4.0   CHLORIDE 101  --   --   --  104  --  107   CO2 26  --   --   --  23  --  26   BUN 20.3  --   --   --  33.4*  --  27.1*   CR 3.33*  --   --   --  5.41*  --  4.39*   GLC 98 104* 131*   < > 100*   < > 177*   ESTUARDO 8.4*  --   --   --  8.4*  --  8.9   MAG 1.9  --   --   --  2.4*  --  2.4*   PHOS 3.3  --   --   --  4.7*  --  3.5    < > = values in this interval not displayed.       CBC -   Recent Labs   Lab Test 02/14/24  0807 02/13/24  0700 02/12/24  0756   WBC 4.5 4.5 5.3   HGB 8.2* 9.1* 9.2*    191 216       LFTs -   Recent Labs   Lab Test 02/14/24  0807 02/13/24  0700 02/12/24  0756   ALKPHOS 62 56 67   BILITOTAL 0.2 0.2 0.3   ALT 8 8 7   AST 17 31 17   PROTTOTAL 5.4* 5.5* 5.9*   ALBUMIN 3.2* 3.1* 3.6       Iron Panel -   Recent Labs   Lab Test 09/26/22  0555 09/03/22  1039 08/24/22  0810   IRON 54 21* 41   IRONSAT 22 9* 21   CARLOS 769* 343* 334*         Imaging:  All imaging studies reviewed by me.     Current Medications:   calcium carbonate-vitamin D  1 tablet Per J Tube TID w/meals    ceFAZolin  2 g  Intravenous Pre-Op/Pre-procedure x 1 dose    cyanocobalamin  500 mcg Per Feeding Tube Daily    dapsone  50 mg Per J Tube Q Mon Wed Fri AM    [Held by provider] heparin ANTICOAGULANT  5,000 Units Subcutaneous Q12H    lipids plant base  250 mL Intravenous Q24H    metoprolol  25 mg Per J Tube BID    multivitamins w/minerals  15 mL Per Feeding Tube Daily    pantoprazole  40 mg Per J Tube BID    predniSONE  5 mg Per J Tube QAM    And    predniSONE  2.5 mg Per J Tube QPM    sevelamer carbonate (RENVELA)  0.8 g Oral BID    tacrolimus  4 mg Per J Tube QAM    And    tacrolimus  4.5 mg Per J Tube QPM      dextrose      parenteral nutrition - Clinimix E 42 mL/hr at 02/13/24 0659    sodium chloride 0.9%       Ravin Johnston MD

## 2024-02-14 NOTE — PROGRESS NOTES
Care Management Follow Up    Length of Stay (days): 3    Expected Discharge Date: 02/14/2024     Concerns to be Addressed: discharge planning, other (see comments) (New TPN)     Patient plan of care discussed at interdisciplinary rounds: Yes    Anticipated Discharge Disposition: Home Infusion     Anticipated Discharge Services: Home TPN  Anticipated Discharge DME: None    Patient/family educated on Medicare website which has current facility and service quality ratings:  yes  Education Provided on the Discharge Plan: Yes  Patient/Family in Agreement with the Plan: yes    Referrals Placed by CM/SW: External Care Coordination  Private pay costs discussed: N/A    Additional Information:    I unable to locate an [facility] nurse who could see Sofie Higgins today for TPN teach. Oz Murrell can do a SOC  if pt is home by 1400.    Benefits verified.  Patient has Medicare and [facility] PMAP and is covered 100%.  [facility] spoke with Sofie to review home infusion services, reviewed benefits and offered choice of providers.  Patient would like to remain in the [facility] system and will use [facility] for home infusion.  Confirmed discharge address, phone, and emergency contact information. Sofie is willing to learn and manage home IV therapy.  Questions answered.  Plan for Oz Murrell to provide home care services after discharge.    Pt Sofie Rodriguez meets Medicare criteria for TPN and must have the 90 day GERMAINE or greater notes added to charts by MD. TPN must also be done via CADD pump for coverage. No deductible or out of pocket applies and pt should be covered at 100% between their Medicare and secondary [facility] PMAP plan. Pt may however have an additional copay upon dispense.    Per discussion in rounds, patient will have PICC line and will discharge to home on Friday AM, must  be home by 2 PM, patient lives in [place], [place] appx 1[place]5 hours [place].  Adele liaison with [facility] notified of Friday discharge.  Notified patient of discharge on  Friday, she will set up her ride for @/16 between 12 noon and 12:30 PM with [employer] transportation.    [employer] at 12:30 on Friday.[phone]     Will need to update Milagros they may want updated Run Sheets:  [facility]: Pt has chair time of 11:45 AM T/TH/Sat  Phone number: [phone]  Fax: [phone]    Samantha DOUGLASN RN CCM  RN Care Coordinator 5 MS and 10 [address] [address]  [email or web address]   Office:   [phone]   Pager: [phone]     Weekend [place] Pager:5 ortho,5 Med/Surg & WB ED  [phone]   Weekend 6MS, 8A, 10ICU- Pager: [phone]     For weekend RN care coordinator needs (home discharge with needs including home care, assisted living facility returns, durable medical equip, IV antibiotics  Units: 5 Ortho Vocera & 5 Med Surg Vocera  Pager: [phone]  Units: 6 Med Surg Vocera & 8 Med Surg Vocera  Pager [phone]

## 2024-02-14 NOTE — PROGRESS NOTES
Post Procedure Summary:  Prior to the start of the procedure and with procedural staff participation, I verbally confirmed the patient s identity using two indicators, relevant allergies, that the procedure was appropriate and matched the consent or emergent situation, and that the correct equipment/implants were available. Immediately prior to starting the procedure I conducted the Time Out with the procedural staff and re-confirmed the patient s name, procedure, and site/side. (The Joint Commission universal protocol was followed.)  Yes       Sedatives: versed (midazolam) 1 mg    Vital signs, airway and pulse oximetry were monitored and remained stable throughout the procedure and sedation was maintained until the procedure was complete.  The patient was monitored by staff until sedation discharge criteria were met.    Patient tolerance: Patient tolerated the procedure well with no immediate complications.    Time of sedation in minutes: 15 Minutes minutes from beginning to end of physician one to one monitoring.

## 2024-02-14 NOTE — PROGRESS NOTES
CLINICAL NUTRITION SERVICES - BRIEF NOTE     Nutrition Prescription    RECOMMENDATIONS FOR MDs/PROVIDERS TO ORDER:  None    Malnutrition Status:    Severe malnutrition in context of chronic disease     Recommendations already ordered by Registered Dietitian (RD):  Dosing weight:  43.4 kg  Access: Central line, to be placed  Initial parameters (per day)  Volume: 1440 mL (per nephrology)  Dextrose: 100 g  AA: 61 g  Lipids: 250 mL 20%, daily     Dextrose titration:   Monitor lytes and if within acceptable parameters (Mg++ > or = 1.5, K+ is > or = 3, and PO4 > or = 1.9), increase dextrose by 50 g/day to goal of 250 g dextrose.     Goal PN provides 250 g dextrose, 61 g AA, and 250 mL 20% lipids seven days per week for total provision of 1594 Kcals (37 Kcals/kg), 1.4 g/kg protein, GIR 4 mg/kg/minute, and 31% fat kcals on average daily.      Future/Additional Recommendations:  Monitor labs for RFS, weight, PO intake tolerance, TPN tolerance         Findings  Plans for port placement today. PN to initiate tonight at 2000 bag.  Body mass index is 18.03 kg/m .  Date/Time Weight Weight Method   02/14/24 0848 46.2 kg (101 lb 12.8 oz) Standing scale   02/13/24 0825 43.4 kg (95 lb 10.9 oz) --   02/10/24 1744 43.4 kg (95 lb 9.6 oz)      Wt Readings from Last 10 Encounters:   02/14/24 46.2 kg (101 lb 12.8 oz)   02/07/24 46.7 kg (102 lb 14.4 oz)   01/24/24 47.2 kg (104 lb)   01/10/24 47.2 kg (104 lb)   11/29/23 49 kg (108 lb)   11/07/23 49.4 kg (109 lb)   10/09/23 51.1 kg (112 lb 10.5 oz)   09/01/23 49.4 kg (109 lb)   08/02/23 52.2 kg (115 lb)   07/25/23 50.3 kg (111 lb)      02/14/24 08:07   Sodium 137   Potassium 4.0   Chloride 101   Carbon Dioxide (CO2) 26   Urea Nitrogen 20.3   Creatinine 3.33 (H)   GFR Estimate 15 (L)   Calcium 8.4 (L)   Anion Gap 10   Magnesium 1.9   Phosphorus 3.3   Albumin 3.2 (L)   Protein Total 5.4 (L)   Alkaline Phosphatase 62   ALT 8   AST 17   Bilirubin Total 0.2   Glucose 98   WBC 4.5   Hemoglobin 8.2  (L)   Hematocrit 27.5 (L)   Platelet Count 175   RBC Count 2.23 (L)    (H)   MCH 36.8 (H)   MCHC 29.8 (L)   RDW 15.5 (H)     INTERVENTIONS  Implementation    Parenteral Nutrition/IV Fluids - Initiate     Follow up/Monitoring  Progress toward goals will be monitored and evaluated per protocol.    Vani MAN  [facility] 5B/10A ICU RD pager: [phone]   On Call Pager (weekends only): [phone]

## 2024-02-14 NOTE — PRE-PROCEDURE
GENERAL PRE-PROCEDURE:   Procedure:  Tunneled central line placement  Date/Time:  2/14/2024 3:13 PM    Written consent obtained?: Yes    Risks and benefits: Risks, benefits and alternatives were discussed    Consent given by:  Patient  Patient states understanding of procedure being performed: Yes    Patient's understanding of procedure matches consent: Yes    Procedure consent matches procedure scheduled: Yes    Expected level of sedation:  Moderate  Appropriately NPO:  Yes  ASA Class:  3  Mallampati  :  Grade 2- soft palate, base of uvula, tonsillar pillars, and portion of posterior pharyngeal wall visible  Lungs:  Lungs clear with good breath sounds bilaterally  Heart:  Normal heart sounds and rate  History & Physical reviewed:  History and physical reviewed and no updates needed  Statement of review:  I have reviewed the lab findings, diagnostic data, medications, and the plan for sedation

## 2024-02-15 LAB
ANION GAP SERPL CALCULATED.3IONS-SCNC: 8 MMOL/L (ref 7–15)
BILIRUB DIRECT SERPL-MCNC: <0.2 MG/DL (ref 0–0.3)
BUN SERPL-MCNC: 34.3 MG/DL (ref 8–23)
CALCIUM SERPL-MCNC: 8.5 MG/DL (ref 8.8–10.2)
CHLORIDE SERPL-SCNC: 98 MMOL/L (ref 98–107)
CREAT SERPL-MCNC: 3.95 MG/DL (ref 0.51–0.95)
DEPRECATED HCO3 PLAS-SCNC: 27 MMOL/L (ref 22–29)
DONOR IDENTIFICATION: NORMAL
DQB2: 6966
DSA COMMENTS: NORMAL
DSA PRESENT: YES
DSA TEST METHOD: NORMAL
EGFRCR SERPLBLD CKD-EPI 2021: 12 ML/MIN/1.73M2
GLUCOSE BLDC GLUCOMTR-MCNC: 119 MG/DL (ref 70–99)
GLUCOSE BLDC GLUCOMTR-MCNC: 130 MG/DL (ref 70–99)
GLUCOSE BLDC GLUCOMTR-MCNC: 166 MG/DL (ref 70–99)
GLUCOSE BLDC GLUCOMTR-MCNC: 171 MG/DL (ref 70–99)
GLUCOSE BLDC GLUCOMTR-MCNC: 180 MG/DL (ref 70–99)
GLUCOSE SERPL-MCNC: 120 MG/DL (ref 70–99)
HGB BLD-MCNC: 8.6 G/DL (ref 11.7–15.7)
MAGNESIUM SERPL-MCNC: 1.9 MG/DL (ref 1.7–2.3)
ORGAN: NORMAL
PHOSPHATE SERPL-MCNC: 4.2 MG/DL (ref 2.5–4.5)
POTASSIUM SERPL-SCNC: 4.8 MMOL/L (ref 3.4–5.3)
SA 1 CELL: NORMAL
SA 1 TEST METHOD: NORMAL
SA 2 CELL: NORMAL
SA 2 TEST METHOD: NORMAL
SA1 HI RISK ABY: NORMAL
SA1 MOD RISK ABY: NORMAL
SA2 HI RISK ABY: NORMAL
SA2 MOD RISK ABY: NORMAL
SODIUM SERPL-SCNC: 133 MMOL/L (ref 135–145)
TACROLIMUS BLD-MCNC: 9.2 UG/L (ref 5–15)
TME LAST DOSE: NORMAL H
TME LAST DOSE: NORMAL H
UNACCEPTABLE ANTIGENS: NORMAL
UNOS CPRA: 37
ZZZSA 1  COMMENTS: NORMAL
ZZZSA 2 COMMENTS: NORMAL

## 2024-02-15 PROCEDURE — 250N000013 HC RX MED GY IP 250 OP 250 PS 637: Performed by: INTERNAL MEDICINE

## 2024-02-15 PROCEDURE — 250N000011 HC RX IP 250 OP 636: Performed by: INTERNAL MEDICINE

## 2024-02-15 PROCEDURE — 82248 BILIRUBIN DIRECT: CPT | Performed by: INTERNAL MEDICINE

## 2024-02-15 PROCEDURE — 258N000003 HC RX IP 258 OP 636: Performed by: INTERNAL MEDICINE

## 2024-02-15 PROCEDURE — 99233 SBSQ HOSP IP/OBS HIGH 50: CPT | Performed by: INTERNAL MEDICINE

## 2024-02-15 PROCEDURE — 85018 HEMOGLOBIN: CPT | Performed by: INTERNAL MEDICINE

## 2024-02-15 PROCEDURE — 36415 COLL VENOUS BLD VENIPUNCTURE: CPT | Performed by: PHYSICIAN ASSISTANT

## 2024-02-15 PROCEDURE — 250N000009 HC RX 250: Performed by: INTERNAL MEDICINE

## 2024-02-15 PROCEDURE — 250N000012 HC RX MED GY IP 250 OP 636 PS 637: Performed by: INTERNAL MEDICINE

## 2024-02-15 PROCEDURE — 84100 ASSAY OF PHOSPHORUS: CPT | Performed by: INTERNAL MEDICINE

## 2024-02-15 PROCEDURE — 120N000002 HC R&B MED SURG/OB UMMC

## 2024-02-15 PROCEDURE — 80048 BASIC METABOLIC PNL TOTAL CA: CPT | Performed by: INTERNAL MEDICINE

## 2024-02-15 PROCEDURE — 99233 SBSQ HOSP IP/OBS HIGH 50: CPT | Performed by: PHYSICIAN ASSISTANT

## 2024-02-15 PROCEDURE — B4185 PARENTERAL SOL 10 GM LIPIDS: HCPCS | Mod: JZ | Performed by: INTERNAL MEDICINE

## 2024-02-15 PROCEDURE — 250N000009 HC RX 250: Mod: JZ | Performed by: INTERNAL MEDICINE

## 2024-02-15 PROCEDURE — 83735 ASSAY OF MAGNESIUM: CPT | Performed by: INTERNAL MEDICINE

## 2024-02-15 PROCEDURE — 80197 ASSAY OF TACROLIMUS: CPT | Performed by: PHYSICIAN ASSISTANT

## 2024-02-15 PROCEDURE — 634N000001 HC RX 634: Mod: JZ | Performed by: INTERNAL MEDICINE

## 2024-02-15 RX ORDER — OXYCODONE HCL 5 MG/5 ML
5 SOLUTION, ORAL ORAL EVERY 6 HOURS PRN
Status: DISCONTINUED | OUTPATIENT
Start: 2024-02-15 | End: 2024-02-15

## 2024-02-15 RX ORDER — OXYCODONE HYDROCHLORIDE 5 MG/1
5 TABLET ORAL EVERY 6 HOURS PRN
Status: DISCONTINUED | OUTPATIENT
Start: 2024-02-15 | End: 2024-02-15

## 2024-02-15 RX ORDER — NALOXONE HYDROCHLORIDE 0.4 MG/ML
0.4 INJECTION, SOLUTION INTRAMUSCULAR; INTRAVENOUS; SUBCUTANEOUS
Status: DISCONTINUED | OUTPATIENT
Start: 2024-02-15 | End: 2024-04-15 | Stop reason: HOSPADM

## 2024-02-15 RX ORDER — DEXTROSE MONOHYDRATE 100 MG/ML
INJECTION, SOLUTION INTRAVENOUS CONTINUOUS PRN
Status: DISCONTINUED | OUTPATIENT
Start: 2024-02-15 | End: 2024-03-01

## 2024-02-15 RX ORDER — NALOXONE HYDROCHLORIDE 0.4 MG/ML
0.2 INJECTION, SOLUTION INTRAMUSCULAR; INTRAVENOUS; SUBCUTANEOUS
Status: DISCONTINUED | OUTPATIENT
Start: 2024-02-15 | End: 2024-04-15 | Stop reason: HOSPADM

## 2024-02-15 RX ORDER — ACETAMINOPHEN 325 MG/10.15ML
1000 LIQUID ORAL 3 TIMES DAILY
Status: DISCONTINUED | OUTPATIENT
Start: 2024-02-15 | End: 2024-02-20

## 2024-02-15 RX ORDER — OXYCODONE HCL 5 MG/5 ML
5 SOLUTION, ORAL ORAL EVERY 4 HOURS PRN
Status: DISCONTINUED | OUTPATIENT
Start: 2024-02-15 | End: 2024-02-17

## 2024-02-15 RX ORDER — LIDOCAINE/PRILOCAINE 2.5 %-2.5%
CREAM (GRAM) TOPICAL
Status: DISCONTINUED | OUTPATIENT
Start: 2024-02-15 | End: 2024-04-15 | Stop reason: HOSPADM

## 2024-02-15 RX ADMIN — ACETAMINOPHEN 1000 MG: 325 SOLUTION ORAL at 21:04

## 2024-02-15 RX ADMIN — SODIUM CHLORIDE: 234 INJECTION, SOLUTION INTRAVENOUS at 21:01

## 2024-02-15 RX ADMIN — Medication 2.5 MG: at 04:17

## 2024-02-15 RX ADMIN — SEVELAMER CARBONATE 0.8 G: 800 POWDER, FOR SUSPENSION ORAL at 09:04

## 2024-02-15 RX ADMIN — EPOETIN ALFA-EPBX 4000 UNITS: 10000 INJECTION, SOLUTION INTRAVENOUS; SUBCUTANEOUS at 16:30

## 2024-02-15 RX ADMIN — ACETAMINOPHEN 650 MG: 325 SOLUTION ORAL at 03:04

## 2024-02-15 RX ADMIN — SODIUM CHLORIDE 300 ML: 9 INJECTION, SOLUTION INTRAVENOUS at 14:03

## 2024-02-15 RX ADMIN — PREDNISONE 5 MG: 5 TABLET ORAL at 09:04

## 2024-02-15 RX ADMIN — CYANOCOBALAMIN TAB 500 MCG 500 MCG: 500 TAB at 09:04

## 2024-02-15 RX ADMIN — METOPROLOL TARTRATE 25 MG: 100 TABLET, FILM COATED ORAL at 09:07

## 2024-02-15 RX ADMIN — METOPROLOL TARTRATE 25 MG: 100 TABLET, FILM COATED ORAL at 21:07

## 2024-02-15 RX ADMIN — HEPARIN SODIUM 5000 UNITS: 5000 INJECTION, SOLUTION INTRAVENOUS; SUBCUTANEOUS at 21:06

## 2024-02-15 RX ADMIN — OLIVE OIL AND SOYBEAN OIL 250 ML: 16; 4 INJECTION, EMULSION INTRAVENOUS at 20:59

## 2024-02-15 RX ADMIN — OXYCODONE HYDROCHLORIDE 5 MG: 5 TABLET ORAL at 09:59

## 2024-02-15 RX ADMIN — LIDOCAINE AND PRILOCAINE: 25; 25 CREAM TOPICAL at 14:03

## 2024-02-15 RX ADMIN — HEPARIN SODIUM 5000 UNITS: 5000 INJECTION, SOLUTION INTRAVENOUS; SUBCUTANEOUS at 09:04

## 2024-02-15 RX ADMIN — OXYCODONE HYDROCHLORIDE 5 MG: 5 SOLUTION ORAL at 17:43

## 2024-02-15 RX ADMIN — SODIUM CHLORIDE 250 ML: 9 INJECTION, SOLUTION INTRAVENOUS at 14:04

## 2024-02-15 RX ADMIN — Medication 40 MG: at 21:06

## 2024-02-15 RX ADMIN — Medication 40 MG: at 09:04

## 2024-02-15 RX ADMIN — SEVELAMER CARBONATE 0.8 G: 800 POWDER, FOR SUSPENSION ORAL at 21:06

## 2024-02-15 RX ADMIN — ACETAMINOPHEN 650 MG: 325 SOLUTION ORAL at 09:03

## 2024-02-15 RX ADMIN — Medication 15 ML: at 09:03

## 2024-02-15 RX ADMIN — ACETAMINOPHEN 1000 MG: 325 SOLUTION ORAL at 16:02

## 2024-02-15 RX ADMIN — TACROLIMUS 4 MG: 5 CAPSULE ORAL at 09:07

## 2024-02-15 RX ADMIN — CALCIUM CARBONATE 600 MG (1,500 MG)-VITAMIN D3 400 UNIT TABLET 1 TABLET: at 09:04

## 2024-02-15 RX ADMIN — PREDNISONE 2.5 MG: 2.5 TABLET ORAL at 21:06

## 2024-02-15 ASSESSMENT — ACTIVITIES OF DAILY LIVING (ADL)
ADLS_ACUITY_SCORE: 26
ADLS_ACUITY_SCORE: 27
ADLS_ACUITY_SCORE: 26

## 2024-02-15 NOTE — PROGRESS NOTES
CLINICAL NUTRITION SERVICES - BRIEF NOTE     Nutrition Prescription    RECOMMENDATIONS FOR MDs/PROVIDERS TO ORDER:  None    Malnutrition Status:    Severe malnutrition in context of chronic disease     Recommendations already ordered by Registered Dietitian (RD):  Dosing weight:  43.4 kg  Access: Central line  Today's parameters (per day)  Volume: 1440 mL (per nephrology)  Dextrose: 150 g  AA: 61 g  Lipids: 250 mL 20%, daily     Dextrose titration:   Monitor lytes and if within acceptable parameters (Mg++ > or = 1.5, K+ is > or = 3, and PO4 > or = 1.9), increase dextrose by 50 g/day to goal of 250 g dextrose.     Goal PN provides 250 g dextrose, 61 g AA, and 250 mL 20% lipids seven days per week for total provision of 1594 Kcals (37 Kcals/kg), 1.4 g/kg protein, GIR 4 mg/kg/minute, and 31% fat kcals on average daily.      Tarena Peptide 1.5 @ 10 mL/hr (240 mL/day) to provide 360 kcal (8.3 kcal/kg), 18 g protein (0.4 g/kg), 33 g CHO, 3.6 g fiber, 18 g fat (40% from MCTs), and 168 mL free water daily     Future/Additional Recommendations:  Monitor labs for RFS, weight, PO intake tolerance, TPN and trickle fed tolerance       Findings    Consult received to initiate trickle feeds to maintain gut integrity.    Central access single lumen R internal jugular   Body mass index is 18.03 kg/m .  Date/Time Weight Weight Method   02/14/24 0848 46.2 kg (101 lb 12.8 oz) Standing scale   02/13/24 0825 43.4 kg (95 lb 10.9 oz) --   02/10/24 1744 43.4 kg (95 lb 9.6 oz)      Wt Readings from Last 10 Encounters:   02/14/24 46.2 kg (101 lb 12.8 oz)   02/07/24 46.7 kg (102 lb 14.4 oz)   01/24/24 47.2 kg (104 lb)   01/10/24 47.2 kg (104 lb)   11/29/23 49 kg (108 lb)   11/07/23 49.4 kg (109 lb)   10/09/23 51.1 kg (112 lb 10.5 oz)   09/01/23 49.4 kg (109 lb)   08/02/23 52.2 kg (115 lb)   07/25/23 50.3 kg (111 lb)      02/15/24 06:20   Sodium 133 (L)   Potassium 4.8   Chloride 98   Carbon Dioxide (CO2) 27   Urea Nitrogen 34.3 (H)    Creatinine 3.95 (H)   GFR Estimate 12 (L)   Calcium 8.5 (L)   Anion Gap 8   Magnesium 1.9   Phosphorus 4.2   Bilirubin Direct <0.20   Glucose 120 (H)       INTERVENTIONS  Implementation    Parenteral Nutrition/IV Fluids - increase dextrose by 50 grams    Follow up/Monitoring  Progress toward goals will be monitored and evaluated per protocol.    Vani Galicia RDN [facility] 5B/10A ICU RD pager: [phone]   On Call Pager (weekends only): [phone]

## 2024-02-15 NOTE — PROGRESS NOTES
Pulmonary Medicine  Cystic Fibrosis - Lung Transplant Team  Initial Consultation  2024    Patient: Sofie Rodriguez  MRN: [id number]  : 1960 (age 63 year old)  Transplant: 2022 (Lung), POD#[id number]  Admission date: 2/10/2024  Primary Care Provider: Vinny Berrios    Assessment & Plan:   Sofie Rodriguez is a 63 year old female with h/o bilateral lung transplant for COPD on 22 with course complicated by post-operative hemidiaphragm palsy, recurrent PNAs, positive DSA, EBV viremia, hypogammaglobulinemia, severe gastroparesis s/p G/J tube placement 22 with pyloric botox 23, GI bleed /2 pyloric ulcer, hemobilia s/p ERCP and MRCP, chronic diarrhea, recurrent C diff colitis, and ESRD on HD. Admitted May 2023 for FTT, supposed to be readmitted in July for FTT, but refused. Admitted to OSH - in December for right hip fracture s/p ORIF, now with significant deconditioning and ongoing severe malnutrition with gastroparesis, small bowel hypermotility and failure to tolerate any tube feeds. After extensive discussion with the dietician, GI and ultimately convincing the patient, the patient was admitted on 2/10 for initiation of TPN/lipids. She is s/p port placement and has been seen by GI who recommends trickle feeds for maintaining bowel and CT enterography to look for structural abnormalities.     Recommendations:   - DSA () pending  - CXR now (ordered)  - Encourage IS and aerobika while awake (ordered)  - Patient should be wearing 2 L O2 at night  - No change to tacrolimus, will recheck next week as OP (ordered for next Thursday if she would still be here)    S/p bilateral lung transplant:   Right hemidiaphragm palsy:   Suspected CARLEE: seen in clinic last week, severely deconditioned, complicated by above. CMV / negative. ImmuKnow 108 and cfDNA 0.12 on 1/10. CT / with decreased MARLON opacities, new tree in bud RLL opacities, denies pulmonary complaints today. PFTs  unchanged in clinic (but ATS not med), remain significantly below her baseline (1.4-1.5L). Notes increased dyspnea since the procedure yesterday, feels her breathing is related to her pain, does also have a slight increase in cough. Currently on O2 for comfort. Review of notes indicate patient should be on 2 L o2 at night from prior overnight O2 study in Jan 2023.   - DSA 2/7 pending  - CXR today  - IS and Aerobika while awake  - Continue 2 L O2 at night; will schedule follow up with sleep to discuss possible CPAP given recommendations from August sleep study    Immunosuppression:  - Tacrolimus 4 mg qAM and tacrolimus 4.5 mg qPM. Goal level 7-9. Tacrolimus level today of 9.2 at 12 hours; no dose change, will recheck level next week as OP or timed for Thursday, 2/22   - Prednisone 5 mg/2.5 mg    Prophylaxis:   - Dapsone 50 mg q MWf for PJP ppx    Positive DSA: no prior treatment for AMR, has been watched for quite some time given no pulmonary symptoms, prior PFTs stability and significant and ongoing failure to thrive. Last DSA improved to 5723, however will ongoing lower PFTs, may need to consider AMR treatment, however, unclear how she would tolerate.   - DSA (2/7) pending     EBV viremia: last level of 96K (2/7), CT c/a/p (2/7) without adenopathy.  - EBV recheck in 3 months    Severe protein calorie malnutrition:  FTT:   Gastroparesis s/p PEG/J s/p botox and G-POEM:   SB Hypomotility  Pyloric ulcer:  Chronic nausea and osmotic diarrhea:  SIBO s/p rifaximin:   Recurrent C diff colitis: chronic diarrhea since transplant with recurrent episodes of C diff. GI previously consulted, felt stools consistent with osmotic diarrhea. Over the last year has lost 40 lbs, unable to tolerate any combination of TF, most recently elemental formula. Normal fecal elastase last May. Following with Dr. Navarro who feels her main two issues are vagal injury induced gastroparesis and probably small bowel hypomotility. Her intolerance to  "J-tube feeds suggests the latter to be a significant issue (notes in a message that there are no motility experts at the  and he is limited in what can be offered). Now s/p port placement with TPN and lipids. GI recommending trickle feeds and CT to assess for structural issues.   - Recs per primary and GI     We appreciate the excellent care provided by the [facility] team.  Recommendations communicated via this note.  Will continue to follow along closely, please do not hesitate to call with any questions or concerns.    Kaylni Triana PA-C on 2/12/2024 at 12:22 PM     History of Present Illness:     No fever or chills, feels like she is coughing more in the past day, denies shortness of breath until after the procedure yesterday, feels it's related to the pain. Notes 7/10 pain in the chest where the incision is, no racing heart. Doing okay with the TPN and lipids, not really eating today, ate \"quite a bit\" and then felt nauseous. Having BMs, very loose.     Review of Systems:     Complete ROS negative except as noted in HPI.    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    Hepatitis 2017    Hep C, [facility]    History of blood transfusion     HTN (hypertension)     Infectious mononucleosis     Lung infection 11/30/2022    Osteopenia      Past Surgical History:   Procedure Laterality Date    BRONCHOSCOPY (RIGID OR FLEXIBLE), DIAGNOSTIC N/A 08/02/2022    Procedure: BRONCHOSCOPY, DIAGNOSTIC- inspection Bronch;  Surgeon: Kamala Lovell MD;  Location:  GI    BRONCHOSCOPY (RIGID OR FLEXIBLE), DIAGNOSTIC N/A 09/13/2022    Procedure: INSPECTION BRONCHOSCOPY, WITH BRONCHOALVEOLAR LAVAGE;  Surgeon: Jose R Mccullough MD;  Location:  GI    BRONCHOSCOPY (RIGID OR " FLEXIBLE), DIAGNOSTIC N/A 11/09/2022    Procedure: BRONCHOSCOPY, WITH BRONCHOALVEOLAR LAVAGE AND BIOPSY;  Surgeon: Cesar Lima MD;  Location:  GI    BRONCHOSCOPY (RIGID OR FLEXIBLE), DIAGNOSTIC N/A 01/25/2023    Procedure: BRONCHOSCOPY, WITH BRONCHOALVEOLAR LAVAGE AND BIOPSY;  Surgeon: Mason Reddy MD;  Location:  GI    BRONCHOSCOPY (RIGID OR FLEXIBLE), DIAGNOSTIC N/A 04/19/2023    Procedure: BRONCHOSCOPY, WITH BRONCHOALVEOLAR LAVAGE AND BIOPSY;  Surgeon: Kamala Lovell MD;  Location:  GI    BRONCHOSCOPY (RIGID OR FLEXIBLE), DIAGNOSTIC N/A 07/12/2023    Procedure: BRONCHOSCOPY, WITH BRONCHOALVEOLAR LAVAGE AND BIOPSY;  Surgeon: Cesar Lima MD;  Location:  GI    BRONCHOSCOPY FLEXIBLE AND RIGID N/A 07/19/2022    Procedure: BRONCHOSCOPY inspection only;  Surgeon: Bob Liao MD;  Location:  GI    COLONOSCOPY  2015    CORONARY ANGIOGRAPHY ADULT ORDER      CV CORONARY ANGIOGRAM N/A 06/30/2021    Procedure: CV CORONARY ANGIOGRAM;  Surgeon: Alexander Cuellar MD;  Location:  [facility]ATH [facility]    CV RIGHT HEART CATH MEASUREMENTS RECORDED N/A 06/30/2021    Procedure: CV RIGHT HEART CATH;  Surgeon: Alexander Cuellar MD;  Location:  [facility]ATH [facility]    ENDOSCOPIC PERORAL MYOTOMY N/A 10/09/2023    Procedure: MYOTOMY, ESOPHAGUS, ENDOSCOPIC, ORAL APPROACH;  Surgeon: Gonzalez Navarro MD;  Location:  OR    ENDOSCOPIC RETROGRADE CHOLANGIOPANCREATOGRAM N/A 08/11/2022    Procedure: ENDOSCOPIC RETROGRADE CHOLANGIOPANCREATOGRAPHY WITH PANCREATIC DUCT NEEDLE KNIFE AND STENT PLACEMENT, BILE DUCT SPHINCTEROTOMY, BLOOD/DEBRIS REMOVAL AND STENT PLACEMENT;  Surgeon: Cosmo Arroyo MD;  Location:  OR    ENDOSCOPIC RETROGRADE CHOLANGIOPANCREATOGRAM N/A 10/07/2022    Procedure: ENDOSCOPIC RETROGRADE CHOLANGIOPANCREATOGRAPHY with biliary and pancreatic stent removal, debris removal;  Surgeon: Cosmo Arroyo MD;  Location:  OR    ENT SURGERY  1974    tonsillectomy     ENTEROSCOPY SMALL BOWEL N/A 08/11/2022    Procedure: SMALL BOWEL ENTEROSCOPY;  Surgeon: Cosmo Arroyo MD;  Location: [facility]    ESOPHAGOGASTRODUODENOSCOPY, WITH NASOGASTRIC TUBE INSERTION N/A 07/01/2022    Procedure: ESOPHAGOGASTRODUODENOSCOPY, WITH NASOJEJUNAL TUBE INSERTION;  Surgeon: Ozzy Nickerson MD;  Location: [facility]    ESOPHAGOSCOPY, GASTROSCOPY, DUODENOSCOPY (EGD), COMBINED N/A 08/03/2022    Procedure: ESOPHAGOGASTRODUODENOSCOPY (EGD);  Surgeon: Ira Andres MD;  Location: [facility]    ESOPHAGOSCOPY, GASTROSCOPY, DUODENOSCOPY (EGD), COMBINED N/A 01/25/2023    Procedure: ESOPHAGOGASTRODUODENOSCOPY (EGD) with botox injection;  Surgeon: Gonzalez Navarro MD;  Location: [facility]    ESOPHAGOSCOPY, GASTROSCOPY, DUODENOSCOPY (EGD), COMBINED N/A 10/09/2023    Procedure: ESOPHAGOGASTRODUODENOSCOPY;  Surgeon: Gonzalez Navarro MD;  Location: [facility]    HAND SURGERY      INSERT CHEST TUBE Right 09/13/2022    Procedure: Insert chest tube;  Surgeon: Jose R Mccullough MD;  Location: [facility]    IR CVC TUNNEL PLACEMENT > 5 YRS OF AGE  09/26/2022    IR GASTRO JEJUNOSTOMY TUBE CHANGE  08/31/2022    IR GASTRO JEJUNOSTOMY TUBE CHANGE  12/21/2022    IR GASTRO JEJUNOSTOMY TUBE CHANGE  07/12/2023    IR GASTRO JEJUNOSTOMY TUBE CHANGE  08/18/2023    IR GASTRO JEJUNOSTOMY TUBE CHANGE  11/14/2023    IR GASTRO JEJUNOSTOMY TUBE PLACEMENT  07/27/2022    IR THORACENTESIS  08/29/2022    LEEP TX, CERVICAL  04/07/2017    HECTOR III    LYMPH NODE BIOPSY Left 2005    Left axilla, benign- [place]    MIDLINE INSERTION - DOUBLE LUMEN Left 07/28/2022    20cm, Basilic vein    REPLACE GASTROJEJUNOSTOMY TUBE, PERCUTANEOUS  10/07/2022    Procedure: Replace Gastrojejunostomy Tube;  Surgeon: Cosmo Arroyo MD;  Location: [facility]    THORACENTESIS Left 08/29/2022    Procedure: THORACENTESIS;  Surgeon: Bo Capone PA-C;  Location: [facility] OR    THORACENTESIS Left 09/13/2022    Procedure: Thoracentesis;  Surgeon: Jose R Mccullough MD;   Location: [facility]    THROMBECTOMY UPPER EXTREMITY Left 07/02/2022    Procedure: LEFT RADIAL ARM THROMBECTOMY;  Surgeon: Christie Graham MD;  Location: [facility]    TRANSPLANT LUNG RECIPIENT SINGLE X2 Bilateral 06/28/2022    Procedure: Clamshell Incision, Bilateral Sequential Lung Transplant, On Cardiopulmonary Bypass, Flexible Bronchoscopy;  Surgeon: Sue Sunshine MD;  Location: [facility]     Social and Family History:     Social History     Socioeconomic History    Marital status:      Spouse name: Not on file    Number of children: Not on file    Years of education: Not on file    Highest education level: Not on file   Occupational History    Not on file   Tobacco Use    Smoking status: Former     Packs/day: 0.50     Years: 30.00     Additional pack years: 0.00     Total pack years: 15.00     Types: Cigarettes     Quit date: 11/11/2020     Years since quitting: 3.2    Smokeless tobacco: Never   Substance and Sexual Activity    Alcohol use: Not Currently     Comment: Stopped drinking in 2020    Drug use: Not Currently     Types: Marijuana, Methamphetamines     Comment: hx:marijuana and methamphetamine-quit both unsure ?  2-3 yrs ago    Sexual activity: Not on file   Other Topics Concern    Parent/sibling w/ CABG, MI or angioplasty before 65F 55M? Not Asked   Social History Narrative    Not on file     Social Determinants of Health     Financial Resource Strain: Not on file   Food Insecurity: Not on file   Transportation Needs: Not on file   Physical Activity: Not on file   Stress: Not on file   Social Connections: Not on file   Interpersonal Safety: Not At Risk (9/1/2023)    Humiliation, Afraid, Rape, and Kick questionnaire     Fear of Current or Ex-Partner: No     Emotionally Abused: No     Physically Abused: No     Sexually Abused: No   Housing Stability: Not on file     No family history on file.  Allergies and Home Medications:     Allergies   Allergen Reactions    Blood Transfusion Related  (Informational Only) Other (See Comments)     Patient has a history of a clinically significant antibody against RBC antigens.  A delay in compatible RBCs may occur.     No Clinical Screening - See Comments Other (See Comments)     Patient has a history of a clinically significant antibody against RBC antigens.  A delay in compatible RBCs may occur.    Azithromycin Rash     12/1/22: Developed a rash that is not raised and looks diffuse in nature. It started in the groin and up the back and has now worked its way up her chest into her face. Pt states that it has now started to itch. She is breathing and talking normally and denies any airway changes. Unclear what started rash. Pt noted feeling somewhat itchy yesterday.    Ganciclovir Rash     12/1/22: Developed a rash that is not raised and looks diffuse in nature. It started in the groin and up the back and has now worked its way up her chest into her face. Pt states that it has now started to itch. She is breathing and talking normally and denies any airway changes. Unclear what started rash. Pt noted feeling somewhat itchy yesterday.     Medications Prior to Admission   Medication Sig Dispense Refill Last Dose    acetaminophen (TYLENOL) 500 MG tablet 500-1,000 mg by Per J Tube route 3 times daily as needed for mild pain   2/10/2024    B Complex-C-Folic Acid (DIALYVITE) TABS 1 tablet by Per J Tube route every morning   2/10/2024    Calcium Carbonate-Vitamin D 600-10 MG-MCG TABS 1 tablet by Per J Tube route 3 times daily 90 tablet 11 2/10/2024    dapsone 2 mg/mL SUSP 25 mLs (50 mg) by Per J Tube route Every Mon, Wed, Fri Morning 1000 mL 11 2/9/2024    furosemide (LASIX) 40 MG tablet 40 mg by Per J Tube route 2 times daily   2/10/2024 at 0930    loperamide (IMODIUM A-D) 2 MG tablet 1 tablet (2 mg) by Per J Tube route 4 times daily as needed for diarrhea   2/10/2024    metoprolol tartrate (LOPRESSOR) 50 MG tablet 0.5 tablets (25 mg) by Per Feeding Tube route 2 times  daily 60 tablet 11 2/10/2024 at 0930    multivitamin (CENTRUM SILVER) tablet Take 1 tablet by mouth daily   2/10/2024 at 0930    pantoprazole (PROTONIX) 2 mg/mL SUSP suspension 20 mLs (40 mg) by Per J Tube route 2 times daily 1200 mL 11 2/10/2024 at 0930    predniSONE (DELTASONE) 5 MG tablet Take 1 tablet (5 mg) by mouth every morning AND 0.5 tablets (2.5 mg) every evening. Take 1 tab (5mg) at AM and 1/2 tab (2.5) in pm (Patient taking differently: Take 1 tablet (5 mg) by J-tube every morning AND 0.5 tablets (2.5 mg) every evening. Take 1 tab (5mg) at AM and 1/2 tab (2.5) in pm) 135 tablet 3 2/10/2024 at 0930    protein modular (PROSOURCE TF) LIQD 1 packet by Per Feeding Tube route daily (Patient taking differently: 1 packet by Per Feeding Tube route daily at 2 pm) 30 packet 11 2/9/2024    sevelamer carbonate, RENVELA, 0.8 GM PACK Packet Take 1 packet (0.8 g) by mouth 3 times daily (with meals) (Patient taking differently: 0.8 g by Per J Tube route 3 times daily (with meals))   Past Week    tacrolimus (GENERIC) 1 mg/mL suspension Take 4 mLs (4 mg) by mouth every morning AND 4 mLs (4 mg) every evening. (Patient taking differently: Take 4 mLs (4 mg) by j-tube every morning AND 4 mLs (4 mg) every evening.)   2/10/2024 at 0930    vitamin B-12 (CYANOCOBALAMIN) 500 MCG tablet 1 tablet (500 mcg) by Per Feeding Tube route daily 90 tablet 3 2/10/2024     Current Scheduled Meds   calcium carbonate-vitamin D  1 tablet Per J Tube TID w/meals    cyanocobalamin  500 mcg Per Feeding Tube Daily    dapsone  50 mg Per J Tube Q Mon Wed Fri AM    epoetin tre-epbx  4,000 Units Intravenous Once in dialysis/CRRT    heparin ANTICOAGULANT  5,000 Units Subcutaneous Q12H    lipids plant base  250 mL Intravenous Q24H    metoprolol  25 mg Per J Tube BID    multivitamins w/minerals  15 mL Per Feeding Tube Daily    - MEDICATION INSTRUCTIONS -   Does not apply Once    pantoprazole  40 mg Per J Tube BID    predniSONE  5 mg Per J Tube QAM    And     predniSONE  2.5 mg Per J Tube QPM    sevelamer carbonate (RENVELA)  0.8 g Oral BID    sodium chloride 0.9%  250 mL Intravenous Once in dialysis/CRRT    sodium chloride 0.9%  300 mL Hemodialysis Machine Once    tacrolimus  4 mg Per J Tube QAM    And    tacrolimus  4.5 mg Per J Tube QPM      Current PRN Meds  acetaminophen, calcium carbonate, dextrose, lidocaine 4%, lidocaine (buffered or not buffered), lidocaine (buffered or not buffered), lidocaine (buffered or not buffered), loperamide, naloxone **OR** naloxone **OR** naloxone **OR** naloxone, ondansetron **OR** ondansetron, oxyCODONE, senna-docusate **OR** senna-docusate, sodium chloride (PF), sodium chloride 0.9%, sodium chloride 0.9%, - MEDICATION INSTRUCTIONS -     Physical Exam:     All notes, images, and labs from past 24 hours (at minimum) were reviewed.    Vital signs:  Temp: 98.4  F (36.9  C) Temp src: Oral BP: (!) 141/70 Pulse: 92   Resp: 16 SpO2: 98 % O2 Device: None (Room air) Oxygen Delivery: 2 LPM   Weight: 46.2 kg (101 lb 12.8 oz)  I/O: No intake or output data in the 24 hours ending 02/12/24 1222  Constitutional: Lying in bed, uncomfortable appearing  HEENT: Eyes with pink conjunctivae, anicteric  PULM: Moderate airflow, scattered mid lung and basilar crackles  CV: Normal S1 and S2.  RRR. + murmur  ABD: NABS, soft, non tender   MSK: Moves all extremities. + muscle wasting  NEURO: Alert and conversant  SKIN: Warm, dry.  No rash on limited exam  PSYCH: Mood stable    Results:     LABS    CMP:   Recent Labs   Lab 02/15/24  0926 02/15/24  0822 02/15/24  0737 02/15/24  0620 02/14/24  1636 02/14/24  0807 02/13/24  1226 02/13/24  0700 02/12/24  1845 02/12/24  0756   NA  --   --   --  133*  --  137  --  140  --  144   POTASSIUM  --   --   --  4.8  --  4.0  --  5.0  --  4.0   CHLORIDE  --   --   --  98  --  101  --  104  --  107   CO2  --   --   --  27  --  26  --  23  --  26   ANIONGAP  --   --   --  8  --  10  --  13  --  11   * 130* 119* 120*   < >  "98   < > 100*   < > 177*   BUN  --   --   --  34.3*  --  20.3  --  33.4*  --  27.1*   CR  --   --   --  3.95*  --  3.33*  --  5.41*  --  4.39*   GFRESTIMATED  --   --   --  12*  --  15*  --  8*  --  11*   ESTUARDO  --   --   --  8.5*  --  8.4*  --  8.4*  --  8.9   MAG  --   --   --  1.9  --  1.9  --  2.4*  --  2.4*   PHOS  --   --   --  4.2  --  3.3  --  4.7*  --  3.5   PROTTOTAL  --   --   --   --   --  5.4*  --  5.5*  --  5.9*   ALBUMIN  --   --   --   --   --  3.2*  --  3.1*  --  3.6   BILITOTAL  --   --   --   --   --  0.2  --  0.2  --  0.3   ALKPHOS  --   --   --   --   --  62  --  56  --  67   AST  --   --   --   --   --  17  --  31  --  17   ALT  --   --   --   --   --  8  --  8  --  7    < > = values in this interval not displayed.     CBC:   Recent Labs   Lab 02/15/24  0620 02/14/24  0807 02/13/24  0700 02/12/24  0756   WBC  --  4.5 4.5 5.3   RBC  --  2.23* 2.27* 2.56*   HGB 8.6* 8.2* 9.1* 9.2*   HCT  --  27.5* 28.1* 30.9*   MCV  --  123* 124* 121*   MCH  --  36.8* 40.1* 35.9*   MCHC  --  29.8* 32.4 29.8*   RDW  --  15.5* 15.8* 15.4*   PLT  --  175 191 216       INR:   Recent Labs   Lab 02/13/24  1658 02/13/24  0700   INR 0.92 0.97       Glucose:   Recent Labs   Lab 02/15/24  0926 02/15/24  0822 02/15/24  0737 02/15/24  0620 02/14/24  2304 02/14/24  1636   * 130* 119* 120* 137* 104*       Blood Gas: No lab results found in last 7 days.    Culture Data No results for input(s): \"CULT\" in the last 168 hours.    Virology Data:   Lab Results   Component Value Date    FLUAH1 Invalid (Invalid) 07/12/2023    FLUAH3 Invalid (Invalid) 07/12/2023    EY2898 Invalid (Invalid) 07/12/2023    IFLUB Invalid (Invalid) 07/12/2023    RSVA Invalid (Invalid) 07/12/2023    RSVB Invalid (Invalid) 07/12/2023    PIV1 Invalid (Invalid) 07/12/2023    PIV2 Invalid (Invalid) 07/12/2023    PIV3 Invalid (Invalid) 07/12/2023    HMPV Invalid (Invalid) 07/12/2023       Historical CMV results (last 3 of prior testing):  Lab Results "   Component Value Date    CMVQNT Not Detected 02/07/2024    CMVQNT Not Detected 01/10/2024    CMVQNT Not Detected 07/25/2023     Lab Results   Component Value Date    CMVLOG 3.2 07/12/2023    CMVLOG <2.1 04/19/2023    CMVLOG 3.5 01/25/2023       Urine Studies    Recent Labs   Lab Test 05/18/23  0627 09/19/22  2254   URINEPH 5.0 7.0   NITRITE Negative Negative   LEUKEST Moderate* Large*   WBCU 21* 29*       Most Recent [facility] Pulmonary Function Testing (FVC/FEV1 only)  FVC-Pre   Date Value Ref Range Status   02/07/2024 1.19 L    01/10/2024 1.12 L    08/29/2023 1.48 L    07/25/2023 1.55 L      FVC-%Pred-Pre   Date Value Ref Range Status   02/07/2024 42 %    01/10/2024 39 %    08/29/2023 53 %    07/25/2023 55 %      FEV1-Pre   Date Value Ref Range Status   02/07/2024 1.13 L    01/10/2024 1.10 L    08/29/2023 1.43 L    07/25/2023 1.54 L      FEV1-%Pred-Pre   Date Value Ref Range Status   02/07/2024 51 %    01/10/2024 49 %    08/29/2023 64 %    07/25/2023 69 %

## 2024-02-15 NOTE — PROGRESS NOTES
"  Nephrology Progress Note  02/15/2024         Assessment & Recommendations:   Sofie Rodriguez is a 63 year old year old female    62yo F with ESKD, lung transplant in 6/28/22, and gastroparesis admitted with failure to thrive and unintentional weight loss. She also reports LE edema and L arm edema at the elbow. She reports feeling \"sick\" whenever she has tube feeds or oral food intake. She has nausea but no vomiting. Neph is consulted for HD.     # ESKD - TTS, LUE AVG, 3hr, [facility] Dr[facility]. She has been losing weight and now even below her recent set EDW.  - HD tomorrow per TTS for 3 hrs and plan 1-2 L remoal  - Renal panel on TTS  - Using Lidocaine cream prior;ordered today  # Edema due to third spacing due to hypoalbuminemia  - Please wrap and elevate her legs  # FTT  # Chronic diarrhea  Working-up currently. Not on any cellcept. Team is planning TPN.  - Please limit fluid < 1.5 L per day for TPN  - Now on TPN since 2/13/24  - PLEASE chart amount of TPN in the I/O  #Hypertension - BP on low side. Noted Furosemide at home but she does not urinate so ok to discontinue.   On metoprolol solution 25 mg twice a day  # Anemia 2/2 ESKD  On Venofer 50 qwk, Mircera last dose 1/9. Hb 8-9.  - Will continue Venofer 50 mcg q week (Tuesday)  - Check CBC once a week on Tuesday  - Started Epogen 4000 U with run while she is admitted; 1st dose 2/13/24  - If Hb now improve then dose needs to be increased next week  # BMD   Phos are normal to low.   - On home sevelamer 800 mg TID.   - Reduced Sevelamer to 800 mg BID since 2/12/24     Recommendations were communicated to primary team via  note     Ravin Johnston MD   Division of Renal Disease and Hypertension  [employer]  Vocera Web Console    Interval History :   Nursing and provider notes from last 24 hours reviewed.  In the last 24 hours Sofie Rodriguez underwent labs central line placement for her TPN, Rt tunnel IJ.  She has some pain this morning.  " Otherwise denies any short of breath or fever.  No diarrhea.  She requests lidocaine cream to be applied 1 hour before dialysis.    Review of Systems:   I reviewed the following systems:  10 system are negative except as mentioned above    Physical Exam:   No intake/output data recorded.   BP (!) 141/70 (BP Location: Right arm)   Pulse 92   Temp 98.4  F (36.9  C) (Oral)   Resp 16   Wt 47.7 kg (105 lb 3.2 oz)   SpO2 98%   BMI 18.64 kg/m       GENERAL APPEARANCE: Alert, NAD, pleasant  EYES:  No scleral icterus, pupils equal; + vinayak orbital and facial ecchymosis  PULM: lungs clear to auscultation bilaterally, equal air movement, no clubbing  CV: regular rhythm, normal rate, no rub     -edema Trace to 1+ edema   GI: soft, non tender, no distended, bowel sounds are present  INTEGUMENT: no cyanosis, no rash  NEURO:  no asterixis   Access Left AVG: + Bruit and thrill    Labs:   All labs reviewed by me  Electrolytes/Renal -   Recent Labs   Lab Test 02/15/24  1030 02/15/24  0926 02/15/24  0822 02/15/24  0737 02/15/24  0620 02/14/24  1636 02/14/24  0807 02/13/24  1226 02/13/24  0700   NA  --   --   --   --  133*  --  137  --  140   POTASSIUM  --   --   --   --  4.8  --  4.0  --  5.0   CHLORIDE  --   --   --   --  98  --  101  --  104   CO2  --   --   --   --  27  --  26  --  23   BUN  --   --   --   --  34.3*  --  20.3  --  33.4*   CR  --   --   --   --  3.95*  --  3.33*  --  5.41*   * 171* 130*   < > 120*   < > 98   < > 100*   ESTUARDO  --   --   --   --  8.5*  --  8.4*  --  8.4*   MAG  --   --   --   --  1.9  --  1.9  --  2.4*   PHOS  --   --   --   --  4.2  --  3.3  --  4.7*    < > = values in this interval not displayed.       CBC -   Recent Labs   Lab Test 02/15/24  0620 02/14/24  0807 02/13/24  0700 02/12/24  0756   WBC  --  4.5 4.5 5.3   HGB 8.6* 8.2* 9.1* 9.2*   PLT  --  175 191 216       LFTs -   Recent Labs   Lab Test 02/14/24  0807 02/13/24  0700 02/12/24  0756   ALKPHOS 62 56 67   BILITOTAL 0.2 0.2 0.3    ALT 8 8 7   AST 17 31 17   PROTTOTAL 5.4* 5.5* 5.9*   ALBUMIN 3.2* 3.1* 3.6       Iron Panel -   Recent Labs   Lab Test 09/26/22  0555 09/03/22  1039 08/24/22  0810   IRON 54 21* 41   IRONSAT 22 9* 21   CARLOS 769* 343* 334*         Imaging:  All imaging studies reviewed by me.     Current Medications:   calcium carbonate-vitamin D  1 tablet Per J Tube TID w/meals    cyanocobalamin  500 mcg Per Feeding Tube Daily    dapsone  50 mg Per J Tube Q Mon Wed Fri AM    epoetin tre-epbx  4,000 Units Intravenous Once in dialysis/CRRT    heparin ANTICOAGULANT  5,000 Units Subcutaneous Q12H    lipids plant base  250 mL Intravenous Q24H    metoprolol  25 mg Per J Tube BID    multivitamins w/minerals  15 mL Per Feeding Tube Daily    - MEDICATION INSTRUCTIONS -   Does not apply Once    pantoprazole  40 mg Per J Tube BID    predniSONE  5 mg Per J Tube QAM    And    predniSONE  2.5 mg Per J Tube QPM    sevelamer carbonate (RENVELA)  0.8 g Oral BID    sodium chloride 0.9%  250 mL Intravenous Once in dialysis/CRRT    sodium chloride 0.9%  300 mL Hemodialysis Machine Once    tacrolimus  4 mg Per J Tube QAM    And    tacrolimus  4.5 mg Per J Tube QPM      dextrose      dextrose      parenteral nutrition - ADULT compounded formula 60 mL/hr at 02/14/24 2022    sodium chloride 0.9%      - MEDICATION INSTRUCTIONS -       Ravin Johnston MD

## 2024-02-15 NOTE — PHARMACY-CONSULT NOTE
Pharmacy Tube Feeding Consult    Medication reviewed for administration by feeding tube and for potential food/drug interactions.    Recommendation: Recommend changing the following medications to a liquid dosage form: oxycodone (per nursing request, completed)      Pharmacy will continue to follow as new medications are ordered.    Brianda Ricardo, PharmD

## 2024-02-15 NOTE — PROGRESS NOTES
Care Management Follow Up    Length of Stay (days): 4    Expected Discharge Date: 02/15/2024     Concerns to be Addressed: discharge planning, other (see comments) (New TPN)     Patient plan of care discussed at interdisciplinary rounds: Yes    Anticipated Discharge Disposition: Home Infusion     Anticipated Discharge Services: None  Anticipated Discharge DME: None    Patient/family educated on Medicare website which has current facility and service quality ratings:    Education Provided on the Discharge Plan: Yes  Patient/Family in Agreement with the Plan: yes    Referrals Placed by CM/SW: External Care Coordination  Private pay costs discussed: Not applicable    Additional Information:  The provider was requesting assistance with completing orders for TPN for discharge, planned for tomorrow. Writer completed several phone calls with the pharmacists in this hospital until writer finally touched based with the pharmacist at [facility]. Sole PharmD was concerned with the patient's frequent changes to TPN over the past couple of days especially because she receives dialysis and is new to TPN. She requested to speak to our pharmacist. Writer was able to contact the unit pharmacist, Brianda, who was able to clarify the TPN orders and discuss with Dr. Lopez. Tiffani also spoke with the [facility] pharmacist, Sole ([phone]).    The pharmacists have decided that the patient is not stable enough on TPN and will have to plan on discharging early next week. [facility] liaison is aware. Dr. Lopez is aware.    Zoe Alva, MSN, APRN, [address] [phone]  Nurse Coordinator  Securely message with [employer] (more info)

## 2024-02-15 NOTE — PLAN OF CARE
Goal Outcome Evaluation:      Plan of Care Reviewed With: patient    Overall Patient Progress: no changeOverall Patient Progress: no change    Outcome Evaluation: significant pain reported in right neck, shoulder, and port area insertion. declined PO intake due to pain and not feeling well      Problem: Pain Acute  Goal: Optimal Pain Control and Function  Outcome: Not Progressing  Intervention: Develop Pain Management Plan  Recent Flowsheet Documentation  Taken 2/15/2024 0959 by Bronwyn Cox, RN  Pain Management Interventions: medication (see MAR)  Taken 2/15/2024 0900 by Bronwyn Cox, RN  Pain Management Interventions: medication (see MAR)   Significant pain in right neck, shoulder, and chest reported, Provider notified and pain medication updated by provider. dialysis had to stop early due to pain and pt reporting she felt unwell. PRN tylenol and oxy administered. She also said the port site area pain is worse when deep breathing      Respiratory: weaned down to 1L oxygen.    BG: Provider paged to clarify BG orders. Recommended to check BG 3x daily on q6 hrs.

## 2024-02-15 NOTE — PLAN OF CARE
Goal Outcome Evaluation:      Plan of Care Reviewed With: patient    Overall Patient Progress: improvingOverall Patient Progress: improving    Outcome Evaluation: Central Line in place, new orders for PPN    Nursing Assessment:  VT: BP (!) 145/74   Pulse 84   Temp 98.4  F (36.9  C) (Oral)   Resp 18   Wt 46.2 kg (101 lb 12.8 oz)   SpO2 96%   BMI 18.03 kg/m       GI/: reported eating big dinner then feeling unwell. Had 1 BM and upset stomach. PRN zofran administered    LDA: PIV removed due to pain when flushing, TPN transferred to central line till new PPN bag can be administered. Central line dressing had small blood drainage    Pain Management:  reported stomach ache after eating dinner and pain in right neck area, PRN tylenol administered    Nursing Plan:  Continue POC, monitor BG    Discharge Disposition:  pending

## 2024-02-15 NOTE — PROGRESS NOTES
GASTROENTEROLOGY PROGRESS NOTE    Date: 02/15/2024     ASSESSMENT:  Sofie Rodriguez is a 63 year old female who was directly admitted on 2/10 for failure to thrive and significant unintentional weight loss. The patient has a very complex medical history including end stage COPD s/p bilateral lung transplant on 6/28/22 complicated by acute limb ischemia of LUE s/p left radial thrombectomy, h/o C. Diff, h/o EBV viremia, ESRD on dialysis, hemobilia s/p ERCP presumably due to hemorrhage into gallbladder, gastroparesis s/p PEGJ placement with intolerance to tube feeding and s/p G-POEM on 10/9/23, chronic diarrhea with +SIBO testing.      #Gastroparesis s/p G-POEM without improvement  #J-tube feeding intolerance concern for small bowel hypomotility  #Weight loss  #Chronic Osmotic Diarrhea/SIBO  Developed gastroparesis following her lung transplant and underwent PEGJ placement by IR on 7/27/22. Gastric emptying study on 1/2/2023 show delayed emptying of 75% at 240 min. Underwent G-POEM on 10/9/23 with Dr. Navarro with no clinical improvement with repeat gastric emptying study showing worsening emptying.  Seen by Dr. Navarro on 1/24/2024.  Discussed restarting Reglan but she would like to hold off at that time which is reasonable given real concern of tardive dyskinesia with long term use.      At the same time, she was also noted to have intolerance to tube feeding given ongoing osmotic diarrhea likely due to small bowel dysmotility.  The small bowel dysmotility could also be from vagal nerve injury that led to gastroparesis. Has history of SIBO in the past (likely from small bowel hypomotility) and treated with several antibiotic regimens. Diarrhea was better for 1.5 month however returned 10 days ago in the setting of changing tube feeding per her report. Last tube feeding was 3-4 days ago. She had more formed stool 2/14.      I have discussed her case with multiple GI staff including Tammi Ace and Mckenna (who  has expertise in dysmotility).  Given very limited medical option for small bowel and possibly pan-intestinal hypomotility from vagal nerve injury, we agree that TPN is likely the only option for her nutritional needs. Will also recommend trickle feeding via J tube at 10 ml/hr to prevent intestinal atrophy. Will also recommend CT enterography to look for any structural abnormalities of the small bowel.  If she develops recurrent abdominal symptoms, will empirically treat for SIBO with a prolonged course (Rifaximne 550 mg TID for 30 days).      I also discussed with her about an outpatient intestinal rehab program at [place] to consider when she is interested and improves significantly clinically.      RECOMMENDATIONS:  - Discussed with RD and will start trickle feeding at 10 ml/hr via J tube  - Continue oral food as tolerated  -  If recurrent abdominal symptoms with enteric nutrition, will empirically treat for SIBO with a prolonged course (Rifaximne 550 mg TID for 30 days).   - CT enterography to look for any structural abnormalities (Can use G and J tube for enteric contrast). Prefer with IV contrast if nephrology is ok         Thank you for involving us in this patient's care. Please do not hesitate to contact the GI service with any questions or concerns.      Pt care plan discussed with Dr. Cadena, GI luminal staff physician.      Miranda Braswell MD, PhD  Gastroenterology Fellow  Division of Gastroenterology, Hepatology and Nutrition  [facility]  Pager: [phone]  _______________________________________________________________      Subjective:  She was very tired this morning. No abdominal pain. Had two mushy stools yesterday.     Objective:  Blood pressure (!) 141/70, pulse 92, temperature 98.4  F (36.9  C), temperature source Oral, resp. rate 16, weight 46.2 kg (101 lb 12.8 oz), SpO2 98%, not currently breastfeeding.    Gen: A&Ox3, NAD  HEENT: sclera anicteric  CV: RRR  Lungs: breathing  comfortably on room air  Abd:  soft, nontender, nondistended, bowel sounds present  Skin: multiple ecchymoses on face and arms  MS: appropriate muscle mass for age  Neuro: non focal       LABS:  BMP  Recent Labs   Lab 02/15/24  0926 02/15/24  0822 02/15/24  0737 02/15/24  0620 02/14/24  1636 02/14/24  0807 02/13/24  1226 02/13/24  0700 02/12/24  1845 02/12/24  0756   NA  --   --   --  133*  --  137  --  140  --  144   POTASSIUM  --   --   --  4.8  --  4.0  --  5.0  --  4.0   CHLORIDE  --   --   --  98  --  101  --  104  --  107   ESTUARDO  --   --   --  8.5*  --  8.4*  --  8.4*  --  8.9   CO2  --   --   --  27  --  26  --  23  --  26   BUN  --   --   --  34.3*  --  20.3  --  33.4*  --  27.1*   CR  --   --   --  3.95*  --  3.33*  --  5.41*  --  4.39*   * 130* 119* 120*   < > 98   < > 100*   < > 177*    < > = values in this interval not displayed.     CBC  Recent Labs   Lab 02/15/24  0620 02/14/24  0807 02/13/24  0700 02/12/24  0756   WBC  --  4.5 4.5 5.3   RBC  --  2.23* 2.27* 2.56*   HGB 8.6* 8.2* 9.1* 9.2*   HCT  --  27.5* 28.1* 30.9*   MCV  --  123* 124* 121*   MCH  --  36.8* 40.1* 35.9*   MCHC  --  29.8* 32.4 29.8*   RDW  --  15.5* 15.8* 15.4*   PLT  --  175 191 216     INR  Recent Labs   Lab 02/13/24  1658 02/13/24  0700   INR 0.92 0.97     LFTs  Recent Labs   Lab 02/14/24  0807 02/13/24  0700 02/12/24  0756   ALKPHOS 62 56 67   AST 17 31 17   ALT 8 8 7   BILITOTAL 0.2 0.2 0.3   PROTTOTAL 5.4* 5.5* 5.9*   ALBUMIN 3.2* 3.1* 3.6      PANCNo lab results found in last 7 days.    IMAGING:  Reviewed

## 2024-02-16 ENCOUNTER — APPOINTMENT (OUTPATIENT)
Dept: GENERAL RADIOLOGY | Facility: CLINIC | Age: 64
DRG: 640 | End: 2024-02-16
Attending: PHYSICIAN ASSISTANT
Payer: MEDICARE

## 2024-02-16 LAB
ALBUMIN SERPL BCG-MCNC: 3.1 G/DL (ref 3.5–5.2)
ALP SERPL-CCNC: 66 U/L (ref 40–150)
ALT SERPL W P-5'-P-CCNC: <5 U/L (ref 0–50)
ANION GAP SERPL CALCULATED.3IONS-SCNC: 10 MMOL/L (ref 7–15)
AST SERPL W P-5'-P-CCNC: 17 U/L (ref 0–45)
BILIRUB SERPL-MCNC: <0.2 MG/DL
BUN SERPL-MCNC: 32.2 MG/DL (ref 8–23)
CALCIUM SERPL-MCNC: 8.5 MG/DL (ref 8.8–10.2)
CHLORIDE SERPL-SCNC: 93 MMOL/L (ref 98–107)
CREAT SERPL-MCNC: 3.14 MG/DL (ref 0.51–0.95)
DEPRECATED HCO3 PLAS-SCNC: 27 MMOL/L (ref 22–29)
EGFRCR SERPLBLD CKD-EPI 2021: 16 ML/MIN/1.73M2
GLUCOSE BLDC GLUCOMTR-MCNC: 184 MG/DL (ref 70–99)
GLUCOSE BLDC GLUCOMTR-MCNC: 187 MG/DL (ref 70–99)
GLUCOSE SERPL-MCNC: 181 MG/DL (ref 70–99)
MAGNESIUM SERPL-MCNC: 1.7 MG/DL (ref 1.7–2.3)
NT-PROBNP SERPL-MCNC: ABNORMAL PG/ML (ref 0–900)
PHOSPHATE SERPL-MCNC: 4.4 MG/DL (ref 2.5–4.5)
PLATELET # BLD AUTO: 183 10E3/UL (ref 150–450)
POTASSIUM SERPL-SCNC: 4 MMOL/L (ref 3.4–5.3)
PROCALCITONIN SERPL IA-MCNC: 0.38 NG/ML
PROT SERPL-MCNC: 5.6 G/DL (ref 6.4–8.3)
SODIUM SERPL-SCNC: 130 MMOL/L (ref 135–145)

## 2024-02-16 PROCEDURE — 250N000009 HC RX 250: Performed by: INTERNAL MEDICINE

## 2024-02-16 PROCEDURE — 120N000002 HC R&B MED SURG/OB UMMC

## 2024-02-16 PROCEDURE — 250N000013 HC RX MED GY IP 250 OP 250 PS 637: Performed by: INTERNAL MEDICINE

## 2024-02-16 PROCEDURE — 83880 ASSAY OF NATRIURETIC PEPTIDE: CPT | Performed by: INTERNAL MEDICINE

## 2024-02-16 PROCEDURE — 90935 HEMODIALYSIS ONE EVALUATION: CPT

## 2024-02-16 PROCEDURE — 84100 ASSAY OF PHOSPHORUS: CPT | Performed by: INTERNAL MEDICINE

## 2024-02-16 PROCEDURE — 99233 SBSQ HOSP IP/OBS HIGH 50: CPT | Performed by: INTERNAL MEDICINE

## 2024-02-16 PROCEDURE — 250N000011 HC RX IP 250 OP 636: Performed by: INTERNAL MEDICINE

## 2024-02-16 PROCEDURE — 250N000012 HC RX MED GY IP 250 OP 636 PS 637: Performed by: INTERNAL MEDICINE

## 2024-02-16 PROCEDURE — 258N000003 HC RX IP 258 OP 636: Performed by: INTERNAL MEDICINE

## 2024-02-16 PROCEDURE — 71046 X-RAY EXAM CHEST 2 VIEWS: CPT

## 2024-02-16 PROCEDURE — 36415 COLL VENOUS BLD VENIPUNCTURE: CPT | Performed by: INTERNAL MEDICINE

## 2024-02-16 PROCEDURE — 84145 PROCALCITONIN (PCT): CPT | Performed by: INTERNAL MEDICINE

## 2024-02-16 PROCEDURE — 83735 ASSAY OF MAGNESIUM: CPT | Performed by: INTERNAL MEDICINE

## 2024-02-16 PROCEDURE — 85049 AUTOMATED PLATELET COUNT: CPT | Performed by: INTERNAL MEDICINE

## 2024-02-16 PROCEDURE — 80053 COMPREHEN METABOLIC PANEL: CPT | Performed by: INTERNAL MEDICINE

## 2024-02-16 PROCEDURE — B4185 PARENTERAL SOL 10 GM LIPIDS: HCPCS | Mod: JZ | Performed by: INTERNAL MEDICINE

## 2024-02-16 PROCEDURE — 71046 X-RAY EXAM CHEST 2 VIEWS: CPT | Mod: 26 | Performed by: RADIOLOGY

## 2024-02-16 RX ORDER — MULTIPLE VITAMINS W/ MINERALS TAB 9MG-400MCG
1 TAB ORAL DAILY
Status: DISCONTINUED | OUTPATIENT
Start: 2024-02-16 | End: 2024-02-19

## 2024-02-16 RX ADMIN — MAGNESIUM SULFATE HEPTAHYDRATE: 500 INJECTION, SOLUTION INTRAMUSCULAR; INTRAVENOUS at 21:14

## 2024-02-16 RX ADMIN — Medication 40 MG: at 09:07

## 2024-02-16 RX ADMIN — Medication 40 MG: at 23:05

## 2024-02-16 RX ADMIN — Medication: at 14:50

## 2024-02-16 RX ADMIN — PREDNISONE 2.5 MG: 2.5 TABLET ORAL at 21:22

## 2024-02-16 RX ADMIN — OXYCODONE HYDROCHLORIDE 5 MG: 5 SOLUTION ORAL at 04:49

## 2024-02-16 RX ADMIN — Medication 1 TABLET: at 09:08

## 2024-02-16 RX ADMIN — TACROLIMUS 4 MG: 5 CAPSULE ORAL at 09:09

## 2024-02-16 RX ADMIN — LIDOCAINE: 40 CREAM TOPICAL at 13:13

## 2024-02-16 RX ADMIN — OLIVE OIL AND SOYBEAN OIL 250 ML: 16; 4 INJECTION, EMULSION INTRAVENOUS at 21:15

## 2024-02-16 RX ADMIN — ONDANSETRON 4 MG: 4 TABLET, ORALLY DISINTEGRATING ORAL at 08:14

## 2024-02-16 RX ADMIN — CALCIUM CARBONATE 600 MG (1,500 MG)-VITAMIN D3 400 UNIT TABLET 1 TABLET: at 09:08

## 2024-02-16 RX ADMIN — PREDNISONE 5 MG: 5 TABLET ORAL at 09:08

## 2024-02-16 RX ADMIN — DAPSONE 50 MG: 100 TABLET ORAL at 11:00

## 2024-02-16 RX ADMIN — SODIUM CHLORIDE 250 ML: 9 INJECTION, SOLUTION INTRAVENOUS at 14:50

## 2024-02-16 RX ADMIN — SEVELAMER CARBONATE 0.8 G: 800 POWDER, FOR SUSPENSION ORAL at 18:22

## 2024-02-16 RX ADMIN — ACETAMINOPHEN 1000 MG: 325 SOLUTION ORAL at 21:19

## 2024-02-16 RX ADMIN — CYANOCOBALAMIN TAB 500 MCG 500 MCG: 500 TAB at 09:08

## 2024-02-16 RX ADMIN — ACETAMINOPHEN 1000 MG: 325 SOLUTION ORAL at 13:14

## 2024-02-16 RX ADMIN — ACETAMINOPHEN 1000 MG: 325 SOLUTION ORAL at 09:07

## 2024-02-16 RX ADMIN — HEPARIN SODIUM 5000 UNITS: 5000 INJECTION, SOLUTION INTRAVENOUS; SUBCUTANEOUS at 10:58

## 2024-02-16 RX ADMIN — SODIUM CHLORIDE 300 ML: 9 INJECTION, SOLUTION INTRAVENOUS at 14:49

## 2024-02-16 RX ADMIN — METOPROLOL TARTRATE 25 MG: 100 TABLET, FILM COATED ORAL at 09:08

## 2024-02-16 RX ADMIN — CALCIUM CARBONATE 600 MG (1,500 MG)-VITAMIN D3 400 UNIT TABLET 1 TABLET: at 13:15

## 2024-02-16 RX ADMIN — HEPARIN SODIUM 5000 UNITS: 5000 INJECTION, SOLUTION INTRAVENOUS; SUBCUTANEOUS at 23:06

## 2024-02-16 RX ADMIN — METOPROLOL TARTRATE 25 MG: 100 TABLET, FILM COATED ORAL at 21:22

## 2024-02-16 RX ADMIN — CALCIUM CARBONATE 600 MG (1,500 MG)-VITAMIN D3 400 UNIT TABLET 1 TABLET: at 17:57

## 2024-02-16 RX ADMIN — TACROLIMUS 4.5 MG: 5 CAPSULE ORAL at 17:57

## 2024-02-16 ASSESSMENT — ACTIVITIES OF DAILY LIVING (ADL)
ADLS_ACUITY_SCORE: 26
ADLS_ACUITY_SCORE: 27

## 2024-02-16 NOTE — PROGRESS NOTES
"HEMODIALYSIS TREATMENT NOTE    Date: 2/15/2024  Time: 6:17 PM    Data:  Pre Wt: 47.7 kg (Estimated)  Desired Wt: 45.7  kg   Post Wt:  46.5 kg (Estimated)  Weight change: 1.2  kg  Ultrafiltration - Post Run Net Total Removed (mL): 1216 mL  Vascular Access Status: Fistula  patent  Dialyzer Rinse: Light  Total Blood Volume Processed: 23.07 L   Total Dialysis (Treatment) Time: 3   Dialysate Bath: K 2, Ca 2.5  Heparin: None    Lab:   No    Interventions:  At about an hour 15 mins to end tx, writer took over from RIYA Warner. Pt ended tx an hour to the end of tx per her request, Stated, \"I don;t feel good.\" MD Vickie updated with no new order. 1.2L of fluid net was pulled per order. Got Epogen per order. Pt rinsed back post tx, lumen were saline locked, and hand off report was given to RIYA Barnhart.    Assessment:  -Calm & Cooperative  -VSS  -A & O X 4     Plan:    Per renal    "

## 2024-02-16 NOTE — PROGRESS NOTES
[facility]    Medicine Progress Note - Hospitalist Service, GOLD TEAM 19    Date of Admission:  2/10/2024    Assessment & Plan   Sofie Rodriguez is a 63 year old female admitted on 2/10/2024.  She has a history of bilateral lung transplant, ESRD on HD (T/Th/Sat), gastroparesis s/p PEG/J, malnutrition, recent right femoral fracture s/p ORIF in [facility] presenting as a direct admit from home to the hospital due to concerns of failure to thrive and small bowel dysmotility.  Of note, patient recently had a mechanical fall at home about 2 days prior to this admission.    # Adult failure to thrive, weakness, deconditioning with falls  # Unintentional 40 pound weight loss  # Gastroparesis, Small bowel dysmotility, Chronic osmotic diarrhea/SIBO  # S/P GJ feeding tube with intolerance of enteral nutrition due to the above  Patient lives with her daughter.  Patient had lost about 30% of body weight over the last year.  Documented weights this admission 95 lbs, in January 2023, weight was 136 lbs. Also with recent falls including right facial contusions/ecchymoses prior to this admission.  Has had previous extensive GI assessments demonstrating gastroparesis and small bowel dysmotility.  Has had excellent appetite, but ongoing early satiety and intolerance of tube feeds and oral intake due to early satiety, bloating and nausea and discomfort.  Also chronic loose stools, felt osmotic and consistently worse with increased enteral nutritional intake.  Was admitted from home for the purpose of obtaining Port-A-Cath and initiating long-term TPN to supplement enteral intake.  Also has ESRD, on dialysis, and status post lung transplant.  Port-A-Cath placed 2/14 by IR.  -Gastroenterology consulted, recommended CT enterography, however per radiology would require 1500 cc of enteral contrast over 1 hour which the patient would be very unlikely to tolerate.  -Per gastroenterology/RD,  initiated jejunostomy drip feeding at 10 mL/hr. continue oral food as tolerated.  If recurrent abdominal symptoms with enteric nutrition, will plan to empirically treat for SIBO with a prolonged course of rifaximin 550 mg 3 times daily for 30 days  -RD, nutrition pharmacy consulted, transitioning from peripheral to TPN nutrition via Port-A-Cath  -Per nephrology, would not restrict fluid beyond 1.5 L/day  -Monitor for refeeding syndrome with initiation of TPN, monitor daily CMP  -Discharge plan is home with home health care and [facility] infusion services.  Originally planned discharge home 2/16, but given ESRD and and the fact that the patient is new to TPN with recent need for frequent changes to TPN composition, discharge has been deferred until early next week.    #Right Melina-cath site pain:  Patient complaining of rather severe right Port-A-Cath site pain since it was placed 2/14.  Site looks clean with some small blood oozing under dressing.  No palpable hematoma or fluctuance.  Remains afebrile with no evidence of infection, particularly this early after placement.  Started on oxycodone 2.5 mg every 3 times daily as needed, but inadequate.  Increased to 5 mg every 4 hours as needed, but still having significant pain.  -Increase oxycodone to 5 mg every 4 hours as needed  -Start Tylenol 1 g 3 times daily scheduled     # Hypertension  - Blood pressure appears at goal  - Continue PTA antihypertensive regimen     # End-stage renal disease on hemodialysis  - Dialysis every Tuesday, Thursday, Saturday per nephrology     # Status post lung transplant, suspected CARLEE, right hemidiaphragm palsy:  Complicated by postoperative hemidiaphragm palsy, recurrent pneumonias.  Was admitted from home for Port-A-Cath placement and TPN by transplant pulmonology.  Appreciate their ongoing care and recommendations.  Patient noting increased dyspnea following placement of Port-A-Cath 2/14, which she attributes to her pain at the  Port-A-Cath site with difficulties breathing.  Does have a slight increase in nonproductive cough.  Currently on oxygen for comfort.  Had a prior overnight oximetry 1/2023 with significant desaturations for which she was supposedly to be on 2 L oxygen at night.  Discussed with transplant pulmonology 2/15.  Will be dialyzed today, volume overload likely contributing.  - Continue PTA immunosuppressive agent: Prednisone 5 mg every morning, 2.5 mg every afternoon, tacrolimus 4 mg every morning, 4 mg every afternoon  - Timed tacrolimus level ordered for 2/15  -Continue PTA dapsone for PJP prophylaxis  -Per pulmonology, CXR ordered, IS and Aerobika while awake  -Continue 2 L oxygen at night, pulmonology will be scheduling follow-up with sleep medicine to discuss possible CPAP given recommendations from prior sleep study    # GERD, GI Ppx  - continue PTA Protonix    # Right hip fracture s/p ORIF (December 2023), weakness, gait instability with recurrent falls, recent right facial trauma, contusions following a fall  Patient had right femur fracture in December 2023.  Underwent ORIF at OSH.  Had another mechanical fall 2 days PTA with right hip pain and extensive right facial ecchymoses.  Ecchymoses fading.  Pain improving.  X-ray right hip obtained this admission showed no evidence of acute displaced fracture.  Remains very weak and frail, with protein malnutrition as above.  Resides with her daughter.  -PT, OT    # Macrocytic anemia:  Chronic.  Multifactorial including ACD, ESRD.  No bleeding recently clinically aside from facial ecchymoses.  LFTs normal, TSH normal 2022.  -Epogen per nephrology  -B12, folic acid results pending    #DVT PPX:  -Subcu heparin    #Lymphedema:  Secondary to ESRD, protein malnutrition.  Stable.  -Tubigrip's ordered  -Volume management at dialysis per nephrology          Diet: parenteral nutrition - ADULT compounded formula  Regular Diet Adult  Adult Formula Drip Feeding: Mary Jane Stoll  Peptide 1.5; Jejunostomy; Goal Rate: Begin at 10 mL/hr and hold; mL/hr; Do not advance tube feeding rate unless K+ is = or > 3.0, Mg++ is = or > 1.5, and Phos is = or > 1.9  parenteral nutrition - ADULT compounded formula    DVT Prophylaxis: Heparin SQ  Dong Catheter: Not present  Lines: PRESENT             Cardiac Monitoring: None  Code Status: Full Code      Clinically Significant Risk Factors          # Hypocalcemia: Lowest Ca = 8.4 mg/dL in last 2 days, will monitor and replace as appropriate     # Hypoalbuminemia: Lowest albumin = 3.1 g/dL at 2/13/2024  7:00 AM, will monitor as appropriate             # Severe Malnutrition: based on nutrition assessment      # Financial/Environmental Concerns: none         Disposition Plan      Expected Discharge Date: 02/16/2024      Destination: home with family;home with help/services              Jesús Lopez MD  Hospitalist Service, GOLD TEAM 19  [facility]  Securely message with [employer] (more info)  Text page via [facility] Paging/Directory   See signed in provider for up to date coverage information  ______________________________________________________________________    Interval History   Seen at dialysis in her room.  Ate cereal today, now feeling nauseated with some retching at dialysis.  Had 1 loose bowel movement today.  Denies any fevers or chills.  No cough.  Currently on oxygen, although room air saturations during the day have been acceptable in the low 90s.  Denies any fevers or chills.  No abdominal pain.    Physical Exam   Vital Signs: Temp: 97.7  F (36.5  C) Temp src: Oral BP: 125/68 (Simultaneous filing. User may not have seen previous data.) Pulse: 76   Resp: 16 SpO2: 95 % O2 Device: Nasal cannula Oxygen Delivery: 1 LPM  Weight: 105 lbs 3.2 oz  General: Alert, oriented x 4.  Very pleasant, appropriate.  Appears cachectic but comfortable.  HEENT: Diffuse right facial and upper neck ecchymoses, fading.   Small hematoma on the right lid.  OP moist and clear.  Chest: Velcro rales in the right lower lung fields, left lower and left midlung fields, unchanged.  No wheezes.  No rhonchi.  CV: RRR.  1-2/6 systolic murmur at LLSB  Abdomen: NABS.  Thin.  Soft, nontender, nondistended.  GJ tube site clean.  Extremities: 2+ BLE edema.  1+ left upper extremity edema.  Left upper extremity fistula palpable.  Skin: Right chest Port-A-Cath site clean with a small amount of blood beneath dressing, no palpable hematoma, fluctuance or visible erythema.  Neuro: Generally weak but nonfocal.    60 MINUTES SPENT BY ME on the date of service doing chart review, history, exam, documentation & further activities per the note.      Data      CMP  Recent Labs   Lab 02/15/24  1807 02/15/24  1030 02/15/24  0926 02/15/24  0822 02/15/24  0737 02/15/24  0620 02/14/24  1636 02/14/24  0807 02/13/24  1226 02/13/24  0700 02/12/24  1845 02/12/24  0756   NA  --   --   --   --   --  133*  --  137  --  140  --  144   POTASSIUM  --   --   --   --   --  4.8  --  4.0  --  5.0  --  4.0   CHLORIDE  --   --   --   --   --  98  --  101  --  104  --  107   CO2  --   --   --   --   --  27  --  26  --  23  --  26   ANIONGAP  --   --   --   --   --  8  --  10  --  13  --  11   * 166* 171* 130*   < > 120*   < > 98   < > 100*   < > 177*   BUN  --   --   --   --   --  34.3*  --  20.3  --  33.4*  --  27.1*   CR  --   --   --   --   --  3.95*  --  3.33*  --  5.41*  --  4.39*   GFRESTIMATED  --   --   --   --   --  12*  --  15*  --  8*  --  11*   ESTUARDO  --   --   --   --   --  8.5*  --  8.4*  --  8.4*  --  8.9   MAG  --   --   --   --   --  1.9  --  1.9  --  2.4*  --  2.4*   PHOS  --   --   --   --   --  4.2  --  3.3  --  4.7*  --  3.5   PROTTOTAL  --   --   --   --   --   --   --  5.4*  --  5.5*  --  5.9*   ALBUMIN  --   --   --   --   --   --   --  3.2*  --  3.1*  --  3.6   BILITOTAL  --   --   --   --   --   --   --  0.2  --  0.2  --  0.3   ALKPHOS  --   --   --    --   --   --   --  62  --  56  --  67   AST  --   --   --   --   --   --   --  17  --  31  --  17   ALT  --   --   --   --   --   --   --  8  --  8  --  7    < > = values in this interval not displayed.     CBC  Recent Labs   Lab 02/15/24  0620 02/14/24  0807 02/13/24  0700 02/12/24  0756   WBC  --  4.5 4.5 5.3   RBC  --  2.23* 2.27* 2.56*   HGB 8.6* 8.2* 9.1* 9.2*   HCT  --  27.5* 28.1* 30.9*   MCV  --  123* 124* 121*   MCH  --  36.8* 40.1* 35.9*   MCHC  --  29.8* 32.4 29.8*   RDW  --  15.5* 15.8* 15.4*   PLT  --  175 191 216     INR  Recent Labs   Lab 02/13/24  1658 02/13/24  0700   INR 0.92 0.97     Arterial Blood GasNo lab results found in last 7 days.Venous Blood Gas  No lab results found in last 7 days.  Hemoglobin A1C   Date Value Ref Range Status   07/11/2023 <4.2 <5.7 % Final     Comment:     Normal <5.7%   Prediabetes 5.7-6.4%    Diabetes 6.5% or higher     Note: Adopted from ADA consensus guidelines.   11/11/2022 5.2 <5.7 % Final     Comment:     Normal <5.7%   Prediabetes 5.7-6.4%    Diabetes 6.5% or higher     Note: Adopted from ADA consensus guidelines.   06/28/2022 5.8 (H) <5.7 % Final     Comment:     Normal <5.7%   Prediabetes 5.7-6.4%    Diabetes 6.5% or higher     Note: Adopted from ADA consensus guidelines.   11/13/2020 5.1 <5.7 % Final     Results for orders placed or performed during the hospital encounter of 02/10/24   XR Hip Right 2-3 Views    Narrative    EXAM: XR HIP RIGHT 2-3 VIEWS  LOCATION: [facility]  DATE: 2/12/2024    INDICATION: Recent right hip ORIF, also experienced recent fall. Complaining of pain.  COMPARISON: 06/14/2021.      Impression    IMPRESSION: Postop changes fixation right proximal femur securing a fracture. Healing is incomplete. Mild underlying degenerative changes. Alignment is near-anatomic. No evidence of an acute displaced fracture elsewhere throughout the right hip.   Partially visible gastrojejunostomy.     *Note: Due  to a large number of results and/or encounters for the requested time period, some results have not been displayed. A complete set of results can be found in Results Review.

## 2024-02-16 NOTE — PROGRESS NOTES
CLINICAL NUTRITION SERVICES - REASSESSMENT NOTE     Nutrition Prescription    RECOMMENDATIONS FOR MDs/PROVIDERS TO ORDER:  None    Malnutrition Status:    Severe malnutrition in the context of chronic disease    Recommendations already ordered by Registered Dietitian (RD):  Dosing weight:  43.4 kg  Access: Central line  Today's parameters (per day)  Volume: 1440 mL (per nephrology)  Dextrose: 200 g  AA: 61 g  Lipids: 250 mL 20%, 3x/week     Dextrose titration:   Monitor lytes and if within acceptable parameters (Mg++ > or = 1.5, K+ is > or = 3, and PO4 > or = 1.9), increase dextrose by 50 g/day to goal of 250 g dextrose.     Goal PN provides 250 g dextrose, 61 g AA, and 250 mL 20% lipids three days per week for total provision of 1302 Kcals (30 Kcals/kg), 1.4 g/kg protein, GIR 4 mg/kg/minute, and 16% fat kcals on average daily.      [place] [employer] Peptide 1.5 @ 10 mL/hr (240 mL/day) to provide 360 kcal (8.3 kcal/kg), 18 g protein (0.4 g/kg), 33 g CHO, 3.6 g fiber, 18 g fat (40% from MCTs), and 168 mL free water daily     Total provisions: 1662 kcals/day 38 kcals/kg, 79 grams protein 1.8 gm/kg  Future/Additional Recommendations:  Monitor labs, weights, intakes, TF and PN tolerance       EVALUATION OF THE PROGRESS TOWARD GOALS   Diet: Regular  Nutrition Support: PN and trickle EN  Intake: % PO Per I/Os         NEW FINDINGS   Trickle EN feeds initiated 2/15 to promote gut function  Advancing dextrose in PN  Decreasing lipid delivery to 3x/week   02/16/24 07:03   Magnesium 1.7   Phosphorus 4.4     Modified MVI to PO to reduce diarrhea risks  Date/Time Weight Weight Method   02/15/24 1139 47.7 kg (105 lb 3.2 oz) Standing scale   02/14/24 0848 46.2 kg (101 lb 12.8 oz) Standing scale   02/13/24 0825 43.4 kg (95 lb 10.9 oz) --   02/10/24 1744 43.4 kg (95 lb 9.6 oz) --     Wt Readings from Last 15 Encounters:   02/15/24 47.7 kg (105 lb 3.2 oz)   02/07/24 46.7 kg (102 lb 14.4 oz)   01/24/24 47.2 kg (104 lb)   01/10/24 47.2  kg (104 lb)   11/29/23 49 kg (108 lb)   11/07/23 49.4 kg (109 lb)   10/09/23 51.1 kg (112 lb 10.5 oz)   09/01/23 49.4 kg (109 lb)   08/02/23 52.2 kg (115 lb)   07/25/23 50.3 kg (111 lb)   05/23/23 57.3 kg (126 lb 4.8 oz)   03/01/23 64.4 kg (142 lb)   01/25/23 62.1 kg (136 lb 14.5 oz)   12/21/22 64.3 kg (141 lb 12.8 oz)   10/26/22 66.5 kg (146 lb 8 oz)   Dosing Weight: Actual Body Weight 43.4 kg at admission     ASSESSED NUTRITION NEEDS  Estimated Energy Needs: 1520 - 1735 kcals/day (35 - 40 kcals/kg)  Justification: Repletion and Underweight  Estimated Protein Needs: 52 - 61 grams protein/day (1.2 - 1.4 grams of pro/kg)  Justification: Repletion  Estimated Fluid Needs: 1520 - 1735 mL/day (1 mL/kcal)   Justification: Maintenance    Current Facility-Administered Medications   Medication    acetaminophen (TYLENOL) solution 1,000 mg    calcium carbonate suspension 1,250 mg    calcium carbonate-vitamin D (CALTRATE) 600-10 MG-MCG per tablet 1 tablet    cyanocobalamin (VITAMIN B-12) tablet 500 mcg    dapsone suspension 50 mg    dextrose 10% infusion    dextrose 10% infusion    heparin ANTICOAGULANT injection 5,000 Units    lidocaine (LMX4) cream    lidocaine 1 % 0.1-1 mL    lidocaine-prilocaine (EMLA) cream    lipids plant base (CLINOLIPID) 20 % infusion 250 mL    loperamide (IMODIUM A-D) tablet 2 mg    metoprolol (FIRST-METOPROLOL) solution 25 mg    multivitamins w/minerals liquid 15 mL    naloxone (NARCAN) injection 0.2 mg    Or    naloxone (NARCAN) injection 0.4 mg    Or    naloxone (NARCAN) injection 0.2 mg    Or    naloxone (NARCAN) injection 0.4 mg    ondansetron (ZOFRAN ODT) ODT tab 4 mg    Or    ondansetron (ZOFRAN) injection 4 mg    oxyCODONE (ROXICODONE) solution 5 mg    pantoprazole (PROTONIX) 2 mg/mL suspension 40 mg    parenteral nutrition - ADULT compounded formula    predniSONE (DELTASONE) tablet 5 mg    And    predniSONE (DELTASONE) tablet 2.5 mg    senna-docusate (SENOKOT-S/PERICOLACE) 8.6-50 MG per tablet  1 tablet    Or    senna-docusate (SENOKOT-S/PERICOLACE) 8.6-50 MG per tablet 2 tablet    sevelamer carbonate (RENVELA) Packet 0.8 g    sodium chloride (PF) 0.9% PF flush 3 mL    sodium chloride 0.9 % bag TABLE SOLN    tacrolimus (GENERIC) suspension 4 mg    And    tacrolimus (GENERIC) suspension 4.5 mg       PHYSICAL FINDINGS  See malnutrition section below.  No abnormal nutrition-related physical findings observed.   Jose Alejandro: 20  GI: Last BM 2/15, diarrhea  Bowel regimen: PRN  GI concerns reported: Diarrhea, nausea (maybe from pain per pt)    MALNUTRITION  % Intake: </= 50% for >/= 5 days (severe)  % Weight Loss: > 20% in 1 year (severe)  Subcutaneous Fat Loss: Global severe  Muscle Loss: Global severe  Fluid Accumulation/Edema: Moderate  Malnutrition Diagnosis: Severe malnutrition in the context of chronic disease    Previous Goals   Total avg nutritional intake to meet a minimum of 35 kcal/kg and 1.2 g PRO/kg daily (per dosing wt 43.4 kg).   Evaluation: Met    Previous Nutrition Diagnosis  Inadequate oral intake related to nausea, gastroparesis as evidenced by 30% wt loss x 1 year, muscle and fat wasting   Evaluation: No change, continues to require EN/PN support to meet nutrition needs    CURRENT NUTRITION DIAGNOSIS  Inadequate enteral nutrition infusion related to nausea, gastroparesis as evidenced by pt self report limiting infusions, 30% wt loss x 1 year, muscle and fat wasting    INTERVENTIONS  Implementation  Enteral Nutrition - continue as ordered  Feeding tube flush - continue as ordered  Parenteral Nutrition/IV Fluids - Modify composition -increase dextrose grams, decrease lipids to 3x/week    Goals  Total avg nutritional intake to meet a minimum of 35 kcal/kg and 1.2 g PRO/kg daily (per dosing wt 43.4 kg).    Monitoring/Evaluation  Progress toward goals will be monitored and evaluated per protocol.    Vani MAN  Clinical Dietitian  [facility] [place] 5[facility]/10A ICU Vocera/ or [phone]  On Call Pager  (weekends only): [phone]

## 2024-02-16 NOTE — PROGRESS NOTES
"  Nephrology Progress Note  02/16/2024         Assessment & Recommendations:   Sofie Rodriguez is a 63 year old year old female    62yo F with ESKD, lung transplant in 6/28/22, and gastroparesis admitted with failure to thrive and unintentional weight loss. She also reports LE edema and L arm edema at the elbow. She reports feeling \"sick\" whenever she has tube feeds or oral food intake. She has nausea but no vomiting. Neph is consulted for HD.     # ESKD - TTS, LUE AVG, 3hr, 45.5kg, [facility], Dr. Elias. She has been losing weight and now even below her recent set EDW.  - Extra dialysis today due to volume status, her weight is up to 49kg and her CXR is with more pulmonary edema- tolerated 1.5 liters, BP soft  - HD tomorrow per TTS for 3 hrs and plan 1-2 L removal as tolerated  - Renal panel on TTS  - Using Lidocaine cream prior;ordered today  # Edema due to third spacing due to hypoalbuminemia  - Please wrap and elevate her legs  # FTT  # Chronic diarrhea  Working-up currently. Not on any cellcept. Team is planning TPN.  - Please limit fluid < 1.5 L per day for TPN  - Now on TPN since 2/13/24  - PLEASE chart amount of TPN in the I/O  #Hypertension - BP on low side. Noted Furosemide at home but she does not urinate so ok to discontinue.   On metoprolol solution 25 mg twice a day  # Anemia 2/2 ESKD  On Venofer 50 qwk, Mircera last dose 1/9. Hb 8-9.  - Will continue Venofer 50 mcg q week (Tuesday)  - Check CBC once a week on Tuesday  - Started Epogen 4000 U with run while she is admitted; 1st dose 2/13/24  - If Hb now improve then dose needs to be increased next week  # BMD   Phos are normal to low.   - On home sevelamer 800 mg TID.   - Reduced Sevelamer to 800 mg BID since 2/12/24     Recommendations were communicated to primary team via  note     Rayna Bear Boland MD   Division of Renal Disease and Hypertension  Immunetics  Vocera Web Console    Interval History :   Nursing and provider notes from last " 24 hours reviewed.  In the last 24 hours Sofie Rodriguez is not doing great. She eneded HD early yesterday due to not feeling well. She is up to 49kg and has more edema on her CXR.  We will do extra HD run today.  She requests lidocaine cream to be applied 1 hour before dialysis.    Review of Systems:   I reviewed the following systems:  10 system are negative except as mentioned above    Physical Exam:   I/O last 3 completed shifts:  In: 190 [P.O.:180]  Out: 1216 [Other:1216]   /59 (BP Location: Right arm, Cuff Size: Adult Small)   Pulse 72   Temp 98.6  F (37  C) (Oral)   Resp 16   Wt 49.1 kg (108 lb 4.8 oz)   SpO2 99%   BMI 19.18 kg/m       GENERAL APPEARANCE: Alert, NAD, pleasant  EYES:  No scleral icterus, pupils equal; + vinayak orbital and facial ecchymosis  PULM: lungs clear to auscultation bilaterally, equal air movement, no clubbing  CV: regular rhythm, normal rate, no rub     -1+ edema   GI: soft, non tender, no distended, bowel sounds are present  INTEGUMENT: no cyanosis, no rash  NEURO:  no asterixis   Access Left AVG: + Bruit and thrill    Labs:   All labs reviewed by me  Electrolytes/Renal -   Recent Labs   Lab Test 02/16/24  0703 02/16/24  0617 02/15/24  1807 02/15/24  0737 02/15/24  0620 02/14/24  1636 02/14/24  0807   *  --   --   --  133*  --  137   POTASSIUM 4.0  --   --   --  4.8  --  4.0   CHLORIDE 93*  --   --   --  98  --  101   CO2 27  --   --   --  27  --  26   BUN 32.2*  --   --   --  34.3*  --  20.3   CR 3.14*  --   --   --  3.95*  --  3.33*   * 187* 180*   < > 120*   < > 98   ESTUARDO 8.5*  --   --   --  8.5*  --  8.4*   MAG 1.7  --   --   --  1.9  --  1.9   PHOS 4.4  --   --   --  4.2  --  3.3    < > = values in this interval not displayed.       CBC -   Recent Labs   Lab Test 02/16/24  0703 02/15/24  0620 02/14/24  0807 02/13/24  0700 02/12/24  0756   WBC  --   --  4.5 4.5 5.3   HGB  --  8.6* 8.2* 9.1* 9.2*     --  175 191 216       LFTs -   Recent Labs   Lab Test  02/16/24  0703 02/14/24  0807 02/13/24  0700   ALKPHOS 66 62 56   BILITOTAL <0.2 0.2 0.2   ALT <5 8 8   AST 17 17 31   PROTTOTAL 5.6* 5.4* 5.5*   ALBUMIN 3.1* 3.2* 3.1*       Iron Panel -   Recent Labs   Lab Test 09/26/22  0555 09/03/22  1039 08/24/22  0810   IRON 54 21* 41   IRONSAT 22 9* 21   CARLOS 769* 343* 334*         Imaging:  All imaging studies reviewed by me.     Current Medications:   acetaminophen  1,000 mg Per J Tube TID    calcium carbonate-vitamin D  1 tablet Per J Tube TID w/meals    cyanocobalamin  500 mcg Per Feeding Tube Daily    dapsone  50 mg Per J Tube Q Mon Wed Fri AM    heparin ANTICOAGULANT  5,000 Units Subcutaneous Q12H    lipids plant base  250 mL Intravenous Once per day on Monday Wednesday Friday    metoprolol  25 mg Per J Tube BID    multivitamin w/minerals  1 tablet Oral or Feeding Tube Daily    pantoprazole  40 mg Per J Tube BID    predniSONE  5 mg Per J Tube QAM    And    predniSONE  2.5 mg Per J Tube QPM    sevelamer carbonate (RENVELA)  0.8 g Oral BID    tacrolimus  4 mg Per J Tube QAM    And    tacrolimus  4.5 mg Per J Tube QPM      dextrose      dextrose      parenteral nutrition - ADULT compounded formula      parenteral nutrition - ADULT compounded formula 60 mL/hr at 02/15/24 2101    sodium chloride 0.9%       Rayna Bear Boland MD

## 2024-02-16 NOTE — PLAN OF CARE
Problem: Adult Inpatient Plan of Care  Goal: Absence of Hospital-Acquired Illness or Injury  Intervention: Identify and Manage Fall Risk  Recent Flowsheet Documentation  Taken 2/16/2024 0530 by May Fofana RN  Safety Promotion/Fall Prevention:   activity supervised   assistive device/personal items within reach   clutter free environment maintained   increased rounding and observation   lighting adjusted   nonskid shoes/slippers when out of bed   patient and family education   room near nurse's station   safety round/check completed   supervised activity   mobility aid in reach   room organization consistent  Intervention: Prevent Skin Injury  Recent Flowsheet Documentation  Taken 2/16/2024 0530 by May Fofana RN  Body Position: position changed independently  Intervention: Prevent and Manage VTE (Venous Thromboembolism) Risk  Recent Flowsheet Documentation  Taken 2/16/2024 0530 by May Fofana RN  VTE Prevention/Management: SCDs (sequential compression devices) off  Intervention: Prevent Infection  Recent Flowsheet Documentation  Taken 2/16/2024 0530 by May Fofana RN  Infection Prevention:   rest/sleep promoted   single patient room provided

## 2024-02-16 NOTE — PROGRESS NOTES
[facility]    Medicine Progress Note - Hospitalist Service, GOLD TEAM 19    Date of Admission:  2/10/2024    Assessment & Plan   Sofie Rodriguez is a 63 year old female admitted on 2/10/2024.  She has a history of bilateral lung transplant, ESRD on HD (T/Th/Sat), gastroparesis s/p PEG/J, malnutrition, recent right femoral fracture s/p ORIF in [facility] presenting as a direct admit from home to the hospital due to concerns of failure to thrive and small bowel dysmotility.  Of note, patient recently had a mechanical fall at home about 2 days prior to this admission.    # Adult failure to thrive, weakness, deconditioning with falls  # Unintentional 40 pound weight loss  # Gastroparesis, Small bowel dysmotility, Chronic osmotic diarrhea/SIBO  # S/P GJ feeding tube with intolerance of enteral nutrition due to the above  Patient lives with her daughter.  Patient had lost about 30% of body weight over the last year.  Documented weights this admission 95 lbs, in January 2023, weight was 136 lbs. Also with recent falls including right facial contusions/ecchymoses prior to this admission.  Has had previous extensive GI assessments demonstrating gastroparesis and small bowel dysmotility.  Has had excellent appetite, but ongoing early satiety and intolerance of tube feeds and oral intake due to early satiety, bloating and nausea and discomfort.  Also chronic loose stools, felt osmotic and consistently worse with increased enteral nutritional intake.  Was admitted from home for the purpose of obtaining Port-A-Cath and initiating long-term TPN to supplement enteral intake.  Also has ESRD, on dialysis, and status post lung transplant.  Port-A-Cath placed 2/14 by IR.  Noting some nausea in a.m. 2/16, but no emesis, no abdominal pain or bloating.  Did have 1 loose stool earlier in the morning, but very typical for her when receiving enteral nutrition.  -Gastroenterology consulted,  recommended CT enterography, however per radiology would require 1500 cc of enteral contrast over 1 hour which the patient would be very unlikely to tolerate.  -Per gastroenterology/RD, initiated jejunostomy drip feeding at 10 mL/hr. continue oral food as tolerated.  If recurrent abdominal symptoms with enteric nutrition, will plan to empirically treat for SIBO with a prolonged course of rifaximin 550 mg 3 times daily for 30 days  -RD, nutrition pharmacy following, continue TPN nutrition via Port-A-Cath  -Continue to monitor for refeeding syndrome with initiation of TPN, monitor daily CMP  -Discharge plan is home with home health care and [facility] infusion services.  Originally planned discharge home 2/16, but given ESRD and and the fact that the patient is new to TPN with current need for ongoing adjustments to TPN composition, discharge has been deferred until early next week.  Disposition now complicated by worsening respiratory status, volume overload.    #Right Melina-cath site pain:  Patient complaining of rather severe right Port-A-Cath site pain since it was placed 2/14.  Site looks clean with some small blood oozing under dressing.  No palpable hematoma or fluctuance, and site minimally tender.  Remains afebrile with no evidence of infection site infection.  Pain improved on scheduled Tylenol and oxycodone, but continues to be her primary complaint.  -Continue oxycodone 5 mg every 4 hours as needed  -Continue Tylenol 1 g 3 times daily scheduled     # Hypertension  - Blood pressure appears at goal  - Continue PTA antihypertensive regimen     # End-stage renal disease on hemodialysis  - Dialysis every Tuesday, Thursday, Saturday per nephrology  -As above, dialysis session 2/15 terminated 1 hour early due to equipment issues, now volume overloaded with vascular congestion on CXR and receiving increased intravenous volume via TPN.  Discussed with nephrology, plan additional dialysis session today, 2/16.      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  Was admitted from home for Port-A-Cath placement and TPN by transplant pulmonology.  Appreciate their ongoing care and recommendations.  Patient noting increasing dyspnea following placement of Port-A-Cath 2/14, and has been receiving increased volume via TPN, initially peripheral and now via Port-A-Cath.  Mild, nonproductive cough.  Last dialyzed 2/15, but session cut short 1 hour early apparently due to equipment malfunction.  CXR 2/16 shows diffuse vascular congestion, new compared with 2/7/2024 with some patchy infiltrate in the right middle, lower lung fields.  Official read pending.  Increasing dyspnea, oxygen needs secondary to volume overload in the setting of inadequate dialysis and increased volume intake via TPN.  - Continue PTA immunosuppressive agent: Prednisone 5 mg every morning, 2.5 mg every afternoon, tacrolimus 4 mg every morning, 4 mg every afternoon  - Timed tacrolimus level ordered for 2/15  -Continue PTA dapsone for PJP prophylaxis  -Discussed with Dr. Castillo of pulmonology and Dr. Boland of nephrology 2/16/2024.  Will plan additional dialysis run today, with ongoing maintenance dialysis on Tuesday, Thursday, Saturdays.  Plan transfer to [facility] per transplant pulmonology when bed available.  -Long-term should be on 2 L oxygen at night, pulmonology will be scheduling follow-up with sleep medicine to discuss possible CPAP given recommendations from prior sleep study    # GERD, GI Ppx  - continue PTA Protonix    # Right hip fracture s/p ORIF (December 2023), weakness, gait instability with recurrent falls, recent right facial trauma, contusions following a fall  Patient had right femur fracture in December 2023.  Underwent ORIF at OSH.  Had another mechanical fall 2 days PTA with right hip pain and extensive right facial ecchymoses.  Ecchymoses  fading.  Pain improving.  X-ray right hip obtained this admission showed no evidence of acute displaced fracture.  Remains very weak and frail, with protein malnutrition as above.  Resides with her daughter.  -PT, OT    # Macrocytic anemia:  Chronic.  Multifactorial including ACD, ESRD.  No bleeding recently clinically aside from facial ecchymoses.  LFTs normal, TSH normal 2022.  B12, folic acid normal 2/14/2024.  -Epogen per nephrology    #DVT PPX:  -Subcu heparin    #Lymphedema:  Secondary to ESRD, protein malnutrition.  Stable.  -Tubigrip's ordered  -Volume management at dialysis per nephrology          Diet: Regular Diet Adult  Adult Formula Drip Feeding: Continuous [employer] Peptide 1.5; Jejunostomy; Goal Rate: Begin at 10 mL/hr and hold; mL/hr; Do not advance tube feeding rate unless K+ is = or > 3.0, Mg++ is = or > 1.5, and Phos is = or > 1.9  parenteral nutrition - ADULT compounded formula  parenteral nutrition - ADULT compounded formula    DVT Prophylaxis: Heparin SQ  Dong Catheter: Not present  Lines: PRESENT      CVC Single Lumen Right Internal jugular Non - valved (open ended);Tunneled;Power injectable-Site Assessment: WDL      Cardiac Monitoring: None  Code Status: Full Code      Clinically Significant Risk Factors              # Hypoalbuminemia: Lowest albumin = 3.1 g/dL at 2/16/2024  7:03 AM, will monitor as appropriate             # Severe Malnutrition: based on nutrition assessment      # Financial/Environmental Concerns: none           Jesús Lopez MD  Hospitalist Service, GOLD TEAM 19  [facility]  Securely message with [employer] (more info)  Text page via [facility] Paging/Directory   See signed in provider for up to date coverage information  ______________________________________________________________________    Interval History   Patient seen in her room, lying flat in bed.  Currently on 1.5 L nasal cannula, feels mildly short of breath,  somewhat better when head of bed elevated.  Minimal cough, no phlegm.  Denies any fevers, chills or sweats.  Some mild nausea, but denies any abdominal pain or bloating.  Trickle tube feeds running at 10 cc/h.  Had 1 loose stool earlier this morning, typical for her when she is getting enteral intake.  Continues to complain of pain at the right port site, but oxycodone helping.    Physical Exam   Vital Signs: Temp: 97.7  F (36.5  C) Temp src: Oral BP: 125/68 (Simultaneous filing. User may not have seen previous data.) Pulse: 76   Resp: 16 SpO2: 95 % O2 Device: Nasal cannula Oxygen Delivery: 1 LPM  Weight: 105 lbs 3.2 oz  General: Alert, oriented x 4.  Very pleasant, appropriate.  Appears cachectic but comfortable.  HEENT: Diffuse right facial and upper neck ecchymoses, continuing to fade.  Small hematoma on the right lid, improving.  OP moist and clear.  No thrush.  Chest: Diffuse bilateral rales in the lower and mid lung fields, with some mild rales in the upper lung fields.  No wheezes.  No rhonchi.  CV: RRR.  1/6 systolic murmur at LLSB  Abdomen: NABS.  Thin.  Soft, nontender, nondistended.  GJ tube site clean.  Extremities: 2+ BLE and sacral edema.  1+ left upper extremity edema.  Left upper extremity fistula thrill palpable.  Skin: Right chest Port-A-Cath site clean with a small amount of blood beneath dressing, no palpable hematoma, fluctuance or visible erythema.  No significant tenderness.  Neuro: Generally weak but nonfocal.    60 MINUTES SPENT BY ME on the date of service doing chart review, history, exam, documentation & further activities per the note.      Data      CMP  Recent Labs   Lab 02/16/24  0703 02/16/24  0617 02/15/24  1807 02/15/24  1030 02/15/24  0737 02/15/24  0620 02/14/24  1636 02/14/24  0807 02/13/24  1226 02/13/24  0700 02/12/24  1845 02/12/24  0756   *  --   --   --   --  133*  --  137  --  140  --  144   POTASSIUM 4.0  --   --   --   --  4.8  --  4.0  --  5.0  --  4.0   CHLORIDE  93*  --   --   --   --  98  --  101  --  104  --  107   CO2 27  --   --   --   --  27  --  26  --  23  --  26   ANIONGAP 10  --   --   --   --  8  --  10  --  13  --  11   * 187* 180* 166*   < > 120*   < > 98   < > 100*   < > 177*   BUN 32.2*  --   --   --   --  34.3*  --  20.3  --  33.4*  --  27.1*   CR 3.14*  --   --   --   --  3.95*  --  3.33*  --  5.41*  --  4.39*   GFRESTIMATED 16*  --   --   --   --  12*  --  15*  --  8*  --  11*   ESTUARDO 8.5*  --   --   --   --  8.5*  --  8.4*  --  8.4*  --  8.9   MAG 1.7  --   --   --   --  1.9  --  1.9  --  2.4*  --  2.4*   PHOS 4.4  --   --   --   --  4.2  --  3.3  --  4.7*  --  3.5   PROTTOTAL 5.6*  --   --   --   --   --   --  5.4*  --  5.5*  --  5.9*   ALBUMIN 3.1*  --   --   --   --   --   --  3.2*  --  3.1*  --  3.6   BILITOTAL <0.2  --   --   --   --   --   --  0.2  --  0.2  --  0.3   ALKPHOS 66  --   --   --   --   --   --  62  --  56  --  67   AST 17  --   --   --   --   --   --  17  --  31  --  17   ALT <5  --   --   --   --   --   --  8  --  8  --  7    < > = values in this interval not displayed.     CBC  Recent Labs   Lab 02/16/24  0703 02/15/24  0620 02/14/24  0807 02/13/24  0700 02/12/24  0756   WBC  --   --  4.5 4.5 5.3   RBC  --   --  2.23* 2.27* 2.56*   HGB  --  8.6* 8.2* 9.1* 9.2*   HCT  --   --  27.5* 28.1* 30.9*   MCV  --   --  123* 124* 121*   MCH  --   --  36.8* 40.1* 35.9*   MCHC  --   --  29.8* 32.4 29.8*   RDW  --   --  15.5* 15.8* 15.4*     --  175 191 216     INR  Recent Labs   Lab 02/13/24  1658 02/13/24  0700   INR 0.92 0.97     Arterial Blood GasNo lab results found in last 7 days.Venous Blood Gas  No lab results found in last 7 days.  Hemoglobin A1C   Date Value Ref Range Status   07/11/2023 <4.2 <5.7 % Final     Comment:     Normal <5.7%   Prediabetes 5.7-6.4%    Diabetes 6.5% or higher     Note: Adopted from ADA consensus guidelines.   11/11/2022 5.2 <5.7 % Final     Comment:     Normal <5.7%   Prediabetes 5.7-6.4%    Diabetes  6.5% or higher     Note: Adopted from ADA consensus guidelines.   06/28/2022 5.8 (H) <5.7 % Final     Comment:     Normal <5.7%   Prediabetes 5.7-6.4%    Diabetes 6.5% or higher     Note: Adopted from ADA consensus guidelines.   11/13/2020 5.1 <5.7 % Final     Results for orders placed or performed during the hospital encounter of 02/10/24   XR Hip Right 2-3 Views    Narrative    EXAM: XR HIP RIGHT 2-3 VIEWS  LOCATION: [facility]  DATE: 2/12/2024    INDICATION: Recent right hip ORIF, also experienced recent fall. Complaining of pain.  COMPARISON: 06/14/2021.      Impression    IMPRESSION: Postop changes fixation right proximal femur securing a fracture. Healing is incomplete. Mild underlying degenerative changes. Alignment is near-anatomic. No evidence of an acute displaced fracture elsewhere throughout the right hip.   Partially visible gastrojejunostomy.     *Note: Due to a large number of results and/or encounters for the requested time period, some results have not been displayed. A complete set of results can be found in Results Review.

## 2024-02-16 NOTE — PROGRESS NOTES
HEMODIALYSIS TREATMENT NOTE    Date: 2/16/2024  Time: 5:05 PM    Data:  Pre Wt: 49.1 kg (108 lb 3.9 oz)   Desired Wt: to be established   Post Wt: 47.7 kg (105 lb 2.6 oz)  Weight change: 1.4 kg  Ultrafiltration - Post Run Net Total Removed (mL): 1450 mL  Vascular Access Status: Graft  patent, hemostasis achieved after 10 mins   Dialyzer Rinse: Streaked, Light  Total Blood Volume Processed: 48.9 liters   Total Dialysis (Treatment) Time: 2 hrs 10 mins   Dialysate Bath: K 3, Ca 2.25  Heparin: None    Lab:   No    Interventions:  MD españa, tx ended 20 mins early due to nausea/cramping     Assessment:  Pt's access cannulated and HD initiated w/o issues,   VSS, Pt afebrile,   Pt woke up toward the end of HD and c/o nausea/right upper arm cramps,   Rinseback initiated, MD updated,   Pt verbalized relief after rinseback,   Hand-off report given to bedside RN      Plan:    Next HD per renal team

## 2024-02-16 NOTE — PROGRESS NOTES
Lung Transplant Consult Follow Up Note   February 16, 2024            Assessment and Plan:   Sofie Rodriguez is a 63 year old female with h/o bilateral lung transplant for COPD on 6/28/22 with course complicated by post-operative hemidiaphragm palsy, recurrent PNAs, positive DSA, EBV viremia, hypogammaglobulinemia, severe gastroparesis s/p G/J tube placement 7/27/22 with pyloric botox 1/25/23, GI bleed 2/2 pyloric ulcer, hemobilia s/p ERCP and MRCP, chronic diarrhea, recurrent C diff colitis, and ESRD on HD. Admitted May 2023 for FTT, supposed to be readmitted in July for FTT, but refused. Admitted to OSH 12/4-12/31 in December for right hip fracture s/p ORIF, now with significant deconditioning and ongoing severe malnutrition with gastroparesis, small bowel hypermotility and failure to tolerate any tube feeds. After extensive discussion with the dietician, GI and ultimately convincing the patient, the patient was admitted on 2/10 for initiation of TPN/lipids. She is s/p port placement with persistent pain at port site.  She appears to be volume overloaded based on chest x-ray and exam. GI recommends trickle feeds for maintaining bowel and CT enterography to look for structural abnormalities (unable due to large bolus enteral contrast required for the study).      Recommendations:   - Dialysis today for volume removal  - Strict I/Os and daily weights  - Incentive spirometry and aerobika  - O2 to keep SaO2 > 92%  - Check Prospera (ordered)  - Encourage IS and aerobika while awake (ordered)  - Patient should be wearing 2 L O2 at night  - Continue current immunosuppression  - Strongly consider transfer to [place] for multiple complex lung transplant related needs (discussed with hospitalist and bed control called).       S/p bilateral lung transplant:   Right hemidiaphragm palsy:   Suspected CARLEE: seen in clinic last week, severely deconditioned, complicated by above. CMV 2/7 negative. ImmuKnow 108 and cfDNA 0.12 on  1/10. CT 2/7 with decreased MARLON opacities, new tree in bud RLL opacities without new symptoms. PFTs unchanged in clinic (but ATS not met), remain significantly below her baseline (1.4-1.5L). Notes increased dyspnea since the Port-A-Cath placement.  Also reports a slight increase in cough. Currently on O2 for comfort. Review of notes indicate patient should be on 2 L o2 at night from prior overnight O2 study in Jan 2023.   - DSA 2/7 with persistent DQ B2, MFI increased to 6966, see below  - Check Prospera to evaluate significance of positive DSA (ordered)  - 2/16 CXR reviewed by me with increased vascular markings bilaterally and increased opacities in the right lower lung field, consistent with volume overload and possibly new infection.  -Check procalcitonin and BNP. (Ordered)  - IS and Aerobika while awake  - Overnight oximetry with SaO2 < 88% for 6:46 (min:sec). O2 initiated for SaO2 77%. 95-99% on2L NC.  Continue nighttime oxygen in the hospital.  Repeat overnight oximetry on room air prior to discharge.  - schedule follow up with sleep to discuss possible CPAP given recommendations from August sleep study     Immunosuppression:  - Tacrolimus 4 mg qAM and tacrolimus 4.5 mg qPM. Goal level 7-9. Tacrolimus level on 2/15 of 9.2 at 12 hours; no dose change, will recheck level next week as OP or timed for Thursday, 2/22   - Prednisone 5 mg/2.5 mg     Prophylaxis:   - Dapsone 50 mg q MWf for PJP ppx     Positive DSA: no prior treatment for AMR, has been watched for quite some time given no pulmonary symptoms, prior PFTs stability and significant and ongoing failure to thrive. Last DSA improved to 5723, however will ongoing lower PFTs, may need to consider AMR treatment, however, unclear how she would tolerate.   -  DSA 2/7 with persistent DQ B2, MFI increased to 6966, see below  - Check Prospera to evaluate significance of positive DSA (ordered)    CKD: Dialysis dependent.  Now with increased dyspnea, crackles on exam  and increased vascular markings on chest x-ray.  Suspect volume overload secondary to initiation of TPN.  -Check BNP.  -Dialysis today.  -May require daily dialysis until euvolemic again  -Monitor strict ins and outs and daily weights.(Reordered)     EBV viremia: last level of 96K (2/7), CT c/a/p (2/7) without adenopathy.  - EBV recheck in 3 months     Severe protein calorie malnutrition:  FTT:   Gastroparesis s/p PEG/J s/p botox and G-POEM:   SB Hypomotility  Pyloric ulcer:  Chronic nausea and osmotic diarrhea:  SIBO s/p rifaximin:   Recurrent C diff colitis: chronic diarrhea since transplant with recurrent episodes of C diff. GI previously consulted, felt stools consistent with osmotic diarrhea. Over the last year has lost 40 lbs, unable to tolerate any combination of TF, most recently elemental formula. Normal fecal elastase last May. Following with Dr. Navarro who feels her main two issues are vagal injury induced gastroparesis and probably small bowel hypomotility. Her intolerance to J-tube feeds suggests the latter to be a significant issue (notes in a message that there are no motility experts at the  and he is limited in what can be offered). Now s/p port placement with TPN and lipids. GI recommending trickle feeds and CT to assess for structural issues.   - Recs per primary and GI  -Patient will not tolerate CT enterography according to chart notes as she will require 1500 mL enteral contrast bolus which she is unlikely to tolerate given her gastroparesis and reduced bowel function.     We appreciate the excellent care provided by the [facility] team.  Recommendations communicated via this note.  Will continue to follow along closely, please do not hesitate to call with any questions or concerns.     Ajay Castillo MD  [phone]              Interval History:   Ongoing pain at portacath site.  Reports dyspnea attributed to portacath pain.  Inactive so unable to assess ANAYA.  Cough:  intermittent, past few  days  Sputum: none   Hemoptysis: none   Chest Pain: at portacath           Review of Systems:   C: NEGATIVE for fever, chills. Reports headache.  INTEGUMENTARY/SKIN: no rash or obvious new lesions  ENT/MOUTH: no sore throat, new sinus pain or nasal drainage  RESP: see interval history  CV: NEGATIVE for  palpitations.  Persistent LE edema  GI: no nausea, vomiting, change in stools  : no dysuria  MUSCULOSKELETAL: no myalgias, arthralgias            Medications:      acetaminophen  1,000 mg Per J Tube TID    calcium carbonate-vitamin D  1 tablet Per J Tube TID w/meals    cyanocobalamin  500 mcg Per Feeding Tube Daily    dapsone  50 mg Per J Tube Q Mon Wed Fri AM    heparin ANTICOAGULANT  5,000 Units Subcutaneous Q12H    lipids plant base  250 mL Intravenous Q24H    metoprolol  25 mg Per J Tube BID    multivitamins w/minerals  15 mL Per Feeding Tube Daily    pantoprazole  40 mg Per J Tube BID    predniSONE  5 mg Per J Tube QAM    And    predniSONE  2.5 mg Per J Tube QPM    sevelamer carbonate (RENVELA)  0.8 g Oral BID    tacrolimus  4 mg Per J Tube QAM    And    tacrolimus  4.5 mg Per J Tube QPM     calcium carbonate, dextrose, dextrose, lidocaine 4%, lidocaine (buffered or not buffered), lidocaine-prilocaine, loperamide, naloxone **OR** naloxone **OR** naloxone **OR** naloxone, ondansetron **OR** ondansetron, oxyCODONE, senna-docusate **OR** senna-docusate, sodium chloride (PF), sodium chloride 0.9%         Physical Exam:   Temp:  [97.7  F (36.5  C)-98.5  F (36.9  C)] 97.7  F (36.5  C)  Pulse:  [76-92] 76  Resp:  [15-29] 16  BP: (117-166)/(62-76) 125/68  Cuff Mean (mmHg):  [91] 91  SpO2:  [92 %-100 %] 95 %    Intake/Output Summary (Last 24 hours) at 2/16/2024 0729  Last data filed at 2/16/2024 0500  Gross per 24 hour   Intake 10 ml   Output 1216 ml   Net -1206 ml     Constitutional:   Awake, alert and in no apparent distress     Eyes:   nonicteric     ENT:    oral mucosa moist without lesions     Neck:   Supple  without supraclavicular or cervical lymphadenopathy     Lungs:   Diminished air flow.  Bilat mid and lower lung crackles. No rhonchi.  No wheezes.     Cardiovascular:   Normal S1 and S2.  RRR.  II/VI sys murmur. No gallop. No rub.     Abdomen:   NABS, soft, nondistended, nontender.  No HSM.     Musculoskeletal:   3+ edema     Neurologic:   Alert and conversant.     Skin:   Warm, dry.  No rash on limited exam.             Data:   All laboratory and imaging data reviewed.    Results for orders placed or performed during the hospital encounter of 02/10/24 (from the past 24 hour(s))   Glucose by meter   Result Value Ref Range    GLUCOSE BY METER POCT 119 (H) 70 - 99 mg/dL   Glucose by meter   Result Value Ref Range    GLUCOSE BY METER POCT 130 (H) 70 - 99 mg/dL   Glucose by meter   Result Value Ref Range    GLUCOSE BY METER POCT 171 (H) 70 - 99 mg/dL   Glucose by meter   Result Value Ref Range    GLUCOSE BY METER POCT 166 (H) 70 - 99 mg/dL   Glucose by meter   Result Value Ref Range    GLUCOSE BY METER POCT 180 (H) 70 - 99 mg/dL   Glucose by meter   Result Value Ref Range    GLUCOSE BY METER POCT 187 (H) 70 - 99 mg/dL     *Note: Due to a large number of results and/or encounters for the requested time period, some results have not been displayed. A complete set of results can be found in Results Review.

## 2024-02-17 ENCOUNTER — APPOINTMENT (OUTPATIENT)
Dept: GENERAL RADIOLOGY | Facility: CLINIC | Age: 64
DRG: 640 | End: 2024-02-17
Attending: NURSE PRACTITIONER
Payer: MEDICARE

## 2024-02-17 ENCOUNTER — ANESTHESIA EVENT (OUTPATIENT)
Dept: INTENSIVE CARE | Facility: CLINIC | Age: 64
DRG: 640 | End: 2024-02-17
Payer: MEDICARE

## 2024-02-17 ENCOUNTER — APPOINTMENT (OUTPATIENT)
Dept: GENERAL RADIOLOGY | Facility: CLINIC | Age: 64
DRG: 640 | End: 2024-02-17
Attending: INTERNAL MEDICINE
Payer: MEDICARE

## 2024-02-17 ENCOUNTER — APPOINTMENT (OUTPATIENT)
Dept: CT IMAGING | Facility: CLINIC | Age: 64
DRG: 640 | End: 2024-02-17
Attending: NURSE PRACTITIONER
Payer: MEDICARE

## 2024-02-17 ENCOUNTER — ANESTHESIA (OUTPATIENT)
Dept: INTENSIVE CARE | Facility: CLINIC | Age: 64
DRG: 640 | End: 2024-02-17
Payer: MEDICARE

## 2024-02-17 ENCOUNTER — APPOINTMENT (OUTPATIENT)
Dept: GENERAL RADIOLOGY | Facility: CLINIC | Age: 64
DRG: 640 | End: 2024-02-17
Payer: MEDICARE

## 2024-02-17 LAB
ALBUMIN SERPL BCG-MCNC: 3.1 G/DL (ref 3.5–5.2)
ALBUMIN SERPL BCG-MCNC: 3.1 G/DL (ref 3.5–5.2)
ALLEN'S TEST: YES
ALP SERPL-CCNC: 62 U/L (ref 40–150)
ALP SERPL-CCNC: 79 U/L (ref 40–150)
ALT SERPL W P-5'-P-CCNC: <5 U/L (ref 0–50)
ALT SERPL W P-5'-P-CCNC: <5 U/L (ref 0–50)
AMMONIA PLAS-SCNC: 31 UMOL/L (ref 11–51)
ANION GAP SERPL CALCULATED.3IONS-SCNC: 11 MMOL/L (ref 7–15)
ANION GAP SERPL CALCULATED.3IONS-SCNC: 5 MMOL/L (ref 7–15)
AST SERPL W P-5'-P-CCNC: 13 U/L (ref 0–45)
AST SERPL W P-5'-P-CCNC: 15 U/L (ref 0–45)
B-OH-BUTYR SERPL-SCNC: <0.18 MMOL/L
BASE EXCESS BLDA CALC-SCNC: 5.8 MMOL/L (ref -3–3)
BASE EXCESS BLDV CALC-SCNC: 1.4 MMOL/L (ref -3–3)
BASE EXCESS BLDV CALC-SCNC: 3 MMOL/L (ref -3–3)
BASE EXCESS BLDV CALC-SCNC: 3.8 MMOL/L (ref -3–3)
BASE EXCESS BLDV CALC-SCNC: 7.5 MMOL/L (ref -3–3)
BASOPHILS # BLD AUTO: ABNORMAL 10*3/UL
BASOPHILS # BLD MANUAL: 0 10E3/UL (ref 0–0.2)
BASOPHILS NFR BLD AUTO: ABNORMAL %
BASOPHILS NFR BLD MANUAL: 0 %
BILIRUB SERPL-MCNC: 0.2 MG/DL
BILIRUB SERPL-MCNC: 0.2 MG/DL
BUN SERPL-MCNC: 17.4 MG/DL (ref 8–23)
BUN SERPL-MCNC: 31.7 MG/DL (ref 8–23)
CA-I BLD-MCNC: 5 MG/DL (ref 4.4–5.2)
CALCIUM SERPL-MCNC: 8.3 MG/DL (ref 8.8–10.2)
CALCIUM SERPL-MCNC: 8.5 MG/DL (ref 8.8–10.2)
CHLORIDE SERPL-SCNC: 94 MMOL/L (ref 98–107)
CHLORIDE SERPL-SCNC: 94 MMOL/L (ref 98–107)
COHGB MFR BLD: 71.1 % (ref 96–97)
CREAT SERPL-MCNC: 1.81 MG/DL (ref 0.51–0.95)
CREAT SERPL-MCNC: 2.86 MG/DL (ref 0.51–0.95)
DEPRECATED HCO3 PLAS-SCNC: 25 MMOL/L (ref 22–29)
DEPRECATED HCO3 PLAS-SCNC: 31 MMOL/L (ref 22–29)
EGFRCR SERPLBLD CKD-EPI 2021: 18 ML/MIN/1.73M2
EGFRCR SERPLBLD CKD-EPI 2021: 31 ML/MIN/1.73M2
EOSINOPHIL # BLD AUTO: ABNORMAL 10*3/UL
EOSINOPHIL # BLD MANUAL: 0 10E3/UL (ref 0–0.7)
EOSINOPHIL NFR BLD AUTO: ABNORMAL %
EOSINOPHIL NFR BLD MANUAL: 0 %
ERYTHROCYTE [DISTWIDTH] IN BLOOD BY AUTOMATED COUNT: 14.7 % (ref 10–15)
GLUCOSE BLDC GLUCOMTR-MCNC: 128 MG/DL (ref 70–99)
GLUCOSE BLDC GLUCOMTR-MCNC: 130 MG/DL (ref 70–99)
GLUCOSE BLDC GLUCOMTR-MCNC: 165 MG/DL (ref 70–99)
GLUCOSE BLDC GLUCOMTR-MCNC: 180 MG/DL (ref 70–99)
GLUCOSE SERPL-MCNC: 131 MG/DL (ref 70–99)
GLUCOSE SERPL-MCNC: 233 MG/DL (ref 70–99)
HBV SURFACE AB SERPL IA-ACNC: 8.44 M[IU]/ML
HBV SURFACE AB SERPL IA-ACNC: NONREACTIVE M[IU]/ML
HBV SURFACE AG SERPL QL IA: NONREACTIVE
HCO3 BLD-SCNC: 34 MMOL/L (ref 21–28)
HCO3 BLDV-SCNC: 32 MMOL/L (ref 21–28)
HCO3 BLDV-SCNC: 33 MMOL/L (ref 21–28)
HCO3 BLDV-SCNC: 34 MMOL/L (ref 21–28)
HCO3 BLDV-SCNC: 35 MMOL/L (ref 21–28)
HCT VFR BLD AUTO: 25.9 % (ref 35–47)
HGB BLD-MCNC: 7.8 G/DL (ref 11.7–15.7)
HOLD SPECIMEN: NORMAL
IMM GRANULOCYTES # BLD: ABNORMAL 10*3/UL
IMM GRANULOCYTES NFR BLD: ABNORMAL %
INR PPP: 0.9 (ref 0.85–1.15)
LACTATE SERPL-SCNC: 0.3 MMOL/L (ref 0.7–2)
LYMPHOCYTES # BLD AUTO: ABNORMAL 10*3/UL
LYMPHOCYTES # BLD MANUAL: 0.4 10E3/UL (ref 0.8–5.3)
LYMPHOCYTES NFR BLD AUTO: ABNORMAL %
LYMPHOCYTES NFR BLD MANUAL: 10 %
MAGNESIUM SERPL-MCNC: 1.5 MG/DL (ref 1.7–2.3)
MAGNESIUM SERPL-MCNC: 1.8 MG/DL (ref 1.7–2.3)
MCH RBC QN AUTO: 36.4 PG (ref 26.5–33)
MCHC RBC AUTO-ENTMCNC: 30.1 G/DL (ref 31.5–36.5)
MCV RBC AUTO: 121 FL (ref 78–100)
MONOCYTES # BLD AUTO: ABNORMAL 10*3/UL
MONOCYTES # BLD MANUAL: 0.6 10E3/UL (ref 0–1.3)
MONOCYTES NFR BLD AUTO: ABNORMAL %
MONOCYTES NFR BLD MANUAL: 14 %
NEUTROPHILS # BLD AUTO: ABNORMAL 10*3/UL
NEUTROPHILS # BLD MANUAL: 3.2 10E3/UL (ref 1.6–8.3)
NEUTROPHILS NFR BLD AUTO: ABNORMAL %
NEUTROPHILS NFR BLD MANUAL: 76 %
NRBC # BLD AUTO: 0 10E3/UL
NRBC # BLD AUTO: 0.1 10E3/UL
NRBC BLD AUTO-RTO: 1 /100
NRBC BLD MANUAL-RTO: 2 %
O2/TOTAL GAS SETTING VFR VENT: 30 %
O2/TOTAL GAS SETTING VFR VENT: 30 %
O2/TOTAL GAS SETTING VFR VENT: 35 %
O2/TOTAL GAS SETTING VFR VENT: 45 %
O2/TOTAL GAS SETTING VFR VENT: 45 %
OXYHGB MFR BLDV: 24 % (ref 70–75)
OXYHGB MFR BLDV: 75 % (ref 70–75)
OXYHGB MFR BLDV: 86 % (ref 70–75)
OXYHGB MFR BLDV: 87 % (ref 70–75)
PCO2 BLD: 78 MM HG (ref 35–45)
PCO2 BLDV: 106 MM HG (ref 40–50)
PCO2 BLDV: 70 MM HG (ref 40–50)
PCO2 BLDV: 94 MM HG (ref 40–50)
PCO2 BLDV: 98 MM HG (ref 40–50)
PEEP: 8 CM H2O
PH BLD: 7.25 [PH] (ref 7.35–7.45)
PH BLDV: 7.09 [PH] (ref 7.32–7.43)
PH BLDV: 7.15 [PH] (ref 7.32–7.43)
PH BLDV: 7.15 [PH] (ref 7.32–7.43)
PH BLDV: 7.31 [PH] (ref 7.32–7.43)
PHOSPHATE SERPL-MCNC: 2.3 MG/DL (ref 2.5–4.5)
PHOSPHATE SERPL-MCNC: 3.6 MG/DL (ref 2.5–4.5)
PLAT MORPH BLD: ABNORMAL
PLATELET # BLD AUTO: 145 10E3/UL (ref 150–450)
PO2 BLD: 34 MM HG (ref 80–105)
PO2 BLDV: 14 MM HG (ref 25–47)
PO2 BLDV: 42 MM HG (ref 25–47)
PO2 BLDV: 55 MM HG (ref 25–47)
PO2 BLDV: 63 MM HG (ref 25–47)
POLYCHROMASIA BLD QL SMEAR: SLIGHT
POTASSIUM SERPL-SCNC: 3.6 MMOL/L (ref 3.4–5.3)
POTASSIUM SERPL-SCNC: 3.9 MMOL/L (ref 3.4–5.3)
PROCALCITONIN SERPL IA-MCNC: 0.5 NG/ML
PROT SERPL-MCNC: 5.5 G/DL (ref 6.4–8.3)
PROT SERPL-MCNC: 5.7 G/DL (ref 6.4–8.3)
RBC # BLD AUTO: 2.14 10E6/UL (ref 3.8–5.2)
RBC MORPH BLD: ABNORMAL
SAO2 % BLDA: 69 % (ref 92–100)
SAO2 % BLDV: 24.4 % (ref 70–75)
SAO2 % BLDV: 77.2 % (ref 70–75)
SAO2 % BLDV: 88.3 % (ref 70–75)
SAO2 % BLDV: 90.3 % (ref 70–75)
SARS-COV-2 RNA RESP QL NAA+PROBE: NEGATIVE
SODIUM SERPL-SCNC: 130 MMOL/L (ref 135–145)
SODIUM SERPL-SCNC: 130 MMOL/L (ref 135–145)
STOMATOCYTES BLD QL SMEAR: ABNORMAL
T4 FREE SERPL-MCNC: 0.64 NG/DL (ref 0.9–1.7)
TSH SERPL DL<=0.005 MIU/L-ACNC: 6.5 UIU/ML (ref 0.3–4.2)
WBC # BLD AUTO: 4.2 10E3/UL (ref 4–11)

## 2024-02-17 PROCEDURE — 99418 PROLNG IP/OBS E/M EA 15 MIN: CPT | Performed by: INTERNAL MEDICINE

## 2024-02-17 PROCEDURE — 87641 MR-STAPH DNA AMP PROBE: CPT | Performed by: NURSE PRACTITIONER

## 2024-02-17 PROCEDURE — 82805 BLOOD GASES W/O2 SATURATION: CPT | Performed by: NURSE PRACTITIONER

## 2024-02-17 PROCEDURE — 71250 CT THORAX DX C-: CPT | Mod: 26 | Performed by: RADIOLOGY

## 2024-02-17 PROCEDURE — 71250 CT THORAX DX C-: CPT | Mod: MG

## 2024-02-17 PROCEDURE — 250N000011 HC RX IP 250 OP 636: Performed by: NURSE PRACTITIONER

## 2024-02-17 PROCEDURE — 250N000009 HC RX 250

## 2024-02-17 PROCEDURE — 84100 ASSAY OF PHOSPHORUS: CPT

## 2024-02-17 PROCEDURE — 85610 PROTHROMBIN TIME: CPT

## 2024-02-17 PROCEDURE — 84443 ASSAY THYROID STIM HORMONE: CPT | Performed by: NURSE PRACTITIONER

## 2024-02-17 PROCEDURE — 87640 STAPH A DNA AMP PROBE: CPT | Performed by: NURSE PRACTITIONER

## 2024-02-17 PROCEDURE — 250N000013 HC RX MED GY IP 250 OP 250 PS 637: Performed by: INTERNAL MEDICINE

## 2024-02-17 PROCEDURE — 83735 ASSAY OF MAGNESIUM: CPT | Performed by: INTERNAL MEDICINE

## 2024-02-17 PROCEDURE — 87205 SMEAR GRAM STAIN: CPT | Performed by: NURSE PRACTITIONER

## 2024-02-17 PROCEDURE — 86706 HEP B SURFACE ANTIBODY: CPT | Performed by: INTERNAL MEDICINE

## 2024-02-17 PROCEDURE — 71045 X-RAY EXAM CHEST 1 VIEW: CPT | Mod: 77

## 2024-02-17 PROCEDURE — 71045 X-RAY EXAM CHEST 1 VIEW: CPT | Mod: 26 | Performed by: RADIOLOGY

## 2024-02-17 PROCEDURE — 87340 HEPATITIS B SURFACE AG IA: CPT | Performed by: INTERNAL MEDICINE

## 2024-02-17 PROCEDURE — 84100 ASSAY OF PHOSPHORUS: CPT | Performed by: INTERNAL MEDICINE

## 2024-02-17 PROCEDURE — 82805 BLOOD GASES W/O2 SATURATION: CPT | Performed by: INTERNAL MEDICINE

## 2024-02-17 PROCEDURE — 258N000003 HC RX IP 258 OP 636: Performed by: NURSE ANESTHETIST, CERTIFIED REGISTERED

## 2024-02-17 PROCEDURE — 370N000003 HC ANESTHESIA WARD SERVICE: Performed by: NURSE ANESTHETIST, CERTIFIED REGISTERED

## 2024-02-17 PROCEDURE — 82140 ASSAY OF AMMONIA: CPT | Performed by: INTERNAL MEDICINE

## 2024-02-17 PROCEDURE — 84145 PROCALCITONIN (PCT): CPT

## 2024-02-17 PROCEDURE — 250N000011 HC RX IP 250 OP 636

## 2024-02-17 PROCEDURE — 250N000013 HC RX MED GY IP 250 OP 250 PS 637: Performed by: NURSE PRACTITIONER

## 2024-02-17 PROCEDURE — 94002 VENT MGMT INPAT INIT DAY: CPT

## 2024-02-17 PROCEDURE — 250N000009 HC RX 250: Performed by: INTERNAL MEDICINE

## 2024-02-17 PROCEDURE — 80053 COMPREHEN METABOLIC PANEL: CPT | Performed by: INTERNAL MEDICINE

## 2024-02-17 PROCEDURE — 82330 ASSAY OF CALCIUM: CPT

## 2024-02-17 PROCEDURE — G1010 CDSM STANSON: HCPCS | Mod: GC | Performed by: RADIOLOGY

## 2024-02-17 PROCEDURE — 36592 COLLECT BLOOD FROM PICC: CPT | Performed by: INTERNAL MEDICINE

## 2024-02-17 PROCEDURE — 83735 ASSAY OF MAGNESIUM: CPT

## 2024-02-17 PROCEDURE — 94660 CPAP INITIATION&MGMT: CPT

## 2024-02-17 PROCEDURE — 84155 ASSAY OF PROTEIN SERUM: CPT

## 2024-02-17 PROCEDURE — 250N000012 HC RX MED GY IP 250 OP 636 PS 637: Performed by: INTERNAL MEDICINE

## 2024-02-17 PROCEDURE — 36415 COLL VENOUS BLD VENIPUNCTURE: CPT | Performed by: INTERNAL MEDICINE

## 2024-02-17 PROCEDURE — 3E043XZ INTRODUCTION OF VASOPRESSOR INTO CENTRAL VEIN, PERCUTANEOUS APPROACH: ICD-10-PCS | Performed by: NURSE PRACTITIONER

## 2024-02-17 PROCEDURE — 87070 CULTURE OTHR SPECIMN AEROBIC: CPT | Performed by: NURSE PRACTITIONER

## 2024-02-17 PROCEDURE — 250N000011 HC RX IP 250 OP 636: Performed by: INTERNAL MEDICINE

## 2024-02-17 PROCEDURE — 258N000003 HC RX IP 258 OP 636: Performed by: INTERNAL MEDICINE

## 2024-02-17 PROCEDURE — 70450 CT HEAD/BRAIN W/O DYE: CPT | Mod: MG

## 2024-02-17 PROCEDURE — 99233 SBSQ HOSP IP/OBS HIGH 50: CPT | Performed by: INTERNAL MEDICINE

## 2024-02-17 PROCEDURE — 93010 ELECTROCARDIOGRAM REPORT: CPT | Performed by: INTERNAL MEDICINE

## 2024-02-17 PROCEDURE — 5A1945Z RESPIRATORY VENTILATION, 24-96 CONSECUTIVE HOURS: ICD-10-PCS | Performed by: NURSE PRACTITIONER

## 2024-02-17 PROCEDURE — 82010 KETONE BODYS QUAN: CPT | Performed by: NURSE PRACTITIONER

## 2024-02-17 PROCEDURE — 99292 CRITICAL CARE ADDL 30 MIN: CPT | Performed by: NURSE PRACTITIONER

## 2024-02-17 PROCEDURE — 87635 SARS-COV-2 COVID-19 AMP PRB: CPT

## 2024-02-17 PROCEDURE — 90935 HEMODIALYSIS ONE EVALUATION: CPT

## 2024-02-17 PROCEDURE — 85027 COMPLETE CBC AUTOMATED: CPT | Performed by: INTERNAL MEDICINE

## 2024-02-17 PROCEDURE — 71045 X-RAY EXAM CHEST 1 VIEW: CPT

## 2024-02-17 PROCEDURE — 250N000011 HC RX IP 250 OP 636: Performed by: EMERGENCY MEDICINE

## 2024-02-17 PROCEDURE — 250N000009 HC RX 250: Performed by: NURSE PRACTITIONER

## 2024-02-17 PROCEDURE — 85007 BL SMEAR W/DIFF WBC COUNT: CPT | Performed by: INTERNAL MEDICINE

## 2024-02-17 PROCEDURE — 999N000065 XR CHEST PORT 1 VIEW

## 2024-02-17 PROCEDURE — 99207 PR NO BILLABLE SERVICE THIS VISIT: CPT | Performed by: INTERNAL MEDICINE

## 2024-02-17 PROCEDURE — 200N000002 HC R&B ICU UMMC

## 2024-02-17 PROCEDURE — 999N000157 HC STATISTIC RCP TIME EA 10 MIN

## 2024-02-17 PROCEDURE — 99291 CRITICAL CARE FIRST HOUR: CPT | Performed by: NURSE PRACTITIONER

## 2024-02-17 PROCEDURE — 999N000185 HC STATISTIC TRANSPORT TIME EA 15 MIN

## 2024-02-17 PROCEDURE — 31500 INSERT EMERGENCY AIRWAY: CPT | Performed by: NURSE ANESTHETIST, CERTIFIED REGISTERED

## 2024-02-17 PROCEDURE — 87040 BLOOD CULTURE FOR BACTERIA: CPT | Performed by: INTERNAL MEDICINE

## 2024-02-17 PROCEDURE — 250N000011 HC RX IP 250 OP 636: Mod: JZ | Performed by: INTERNAL MEDICINE

## 2024-02-17 PROCEDURE — 250N000011 HC RX IP 250 OP 636: Performed by: NURSE ANESTHETIST, CERTIFIED REGISTERED

## 2024-02-17 PROCEDURE — 83605 ASSAY OF LACTIC ACID: CPT

## 2024-02-17 PROCEDURE — 84439 ASSAY OF FREE THYROXINE: CPT | Performed by: NURSE PRACTITIONER

## 2024-02-17 PROCEDURE — 70450 CT HEAD/BRAIN W/O DYE: CPT | Mod: 26 | Performed by: RADIOLOGY

## 2024-02-17 RX ORDER — MIDAZOLAM HCL IN 0.9 % NACL/PF 1 MG/ML
1-8 PLASTIC BAG, INJECTION (ML) INTRAVENOUS CONTINUOUS
Status: DISCONTINUED | OUTPATIENT
Start: 2024-02-17 | End: 2024-02-18

## 2024-02-17 RX ORDER — PROPOFOL 10 MG/ML
INJECTION, EMULSION INTRAVENOUS
Status: DISCONTINUED | OUTPATIENT
Start: 2024-02-17 | End: 2024-02-17

## 2024-02-17 RX ORDER — PIPERACILLIN SODIUM, TAZOBACTAM SODIUM 2; .25 G/10ML; G/10ML
2.25 INJECTION, POWDER, LYOPHILIZED, FOR SOLUTION INTRAVENOUS EVERY 6 HOURS
Status: COMPLETED | OUTPATIENT
Start: 2024-02-17 | End: 2024-02-24

## 2024-02-17 RX ORDER — NICOTINE POLACRILEX 4 MG
15-30 LOZENGE BUCCAL
Status: DISCONTINUED | OUTPATIENT
Start: 2024-02-17 | End: 2024-02-28

## 2024-02-17 RX ORDER — FENTANYL CITRATE 50 UG/ML
25-50 INJECTION, SOLUTION INTRAMUSCULAR; INTRAVENOUS
Status: DISCONTINUED | OUTPATIENT
Start: 2024-02-17 | End: 2024-02-17

## 2024-02-17 RX ORDER — NALOXONE HYDROCHLORIDE 0.4 MG/ML
0.2 INJECTION, SOLUTION INTRAMUSCULAR; INTRAVENOUS; SUBCUTANEOUS ONCE
Status: DISCONTINUED | OUTPATIENT
Start: 2024-02-17 | End: 2024-02-17 | Stop reason: ALTCHOICE

## 2024-02-17 RX ORDER — PROPOFOL 10 MG/ML
5-75 INJECTION, EMULSION INTRAVENOUS CONTINUOUS
Status: DISCONTINUED | OUTPATIENT
Start: 2024-02-17 | End: 2024-02-20

## 2024-02-17 RX ORDER — VANCOMYCIN HYDROCHLORIDE 1 G/200ML
1000 INJECTION, SOLUTION INTRAVENOUS ONCE
Status: COMPLETED | OUTPATIENT
Start: 2024-02-17 | End: 2024-02-17

## 2024-02-17 RX ORDER — MAGNESIUM SULFATE HEPTAHYDRATE 40 MG/ML
2 INJECTION, SOLUTION INTRAVENOUS ONCE
Status: COMPLETED | OUTPATIENT
Start: 2024-02-17 | End: 2024-02-17

## 2024-02-17 RX ORDER — ROPIVACAINE IN 0.9% SOD CHL/PF 0.1 %
.03-.125 PLASTIC BAG, INJECTION (ML) EPIDURAL CONTINUOUS
Status: DISCONTINUED | OUTPATIENT
Start: 2024-02-17 | End: 2024-02-19

## 2024-02-17 RX ORDER — FENTANYL CITRATE 50 UG/ML
50 INJECTION, SOLUTION INTRAMUSCULAR; INTRAVENOUS ONCE
Status: DISCONTINUED | OUTPATIENT
Start: 2024-02-17 | End: 2024-02-17

## 2024-02-17 RX ORDER — PROPOFOL 10 MG/ML
INJECTION, EMULSION INTRAVENOUS
Status: COMPLETED
Start: 2024-02-17 | End: 2024-02-17

## 2024-02-17 RX ORDER — NOREPINEPHRINE BITARTRATE 0.02 MG/ML
INJECTION, SOLUTION INTRAVENOUS
Status: DISCONTINUED
Start: 2024-02-17 | End: 2024-02-18 | Stop reason: HOSPADM

## 2024-02-17 RX ORDER — CHLORHEXIDINE GLUCONATE ORAL RINSE 1.2 MG/ML
15 SOLUTION DENTAL EVERY 12 HOURS
Status: DISCONTINUED | OUTPATIENT
Start: 2024-02-17 | End: 2024-02-20

## 2024-02-17 RX ORDER — DEXTROSE MONOHYDRATE 25 G/50ML
25-50 INJECTION, SOLUTION INTRAVENOUS
Status: DISCONTINUED | OUTPATIENT
Start: 2024-02-17 | End: 2024-02-28

## 2024-02-17 RX ADMIN — NALOXONE HYDROCHLORIDE 0.2 MG: 0.4 INJECTION, SOLUTION INTRAMUSCULAR; INTRAVENOUS; SUBCUTANEOUS at 09:31

## 2024-02-17 RX ADMIN — PROPOFOL 20 MG: 10 INJECTION, EMULSION INTRAVENOUS at 20:51

## 2024-02-17 RX ADMIN — SODIUM CHLORIDE 300 ML: 9 INJECTION, SOLUTION INTRAVENOUS at 12:31

## 2024-02-17 RX ADMIN — Medication 1 TABLET: at 08:38

## 2024-02-17 RX ADMIN — CALCIUM CARBONATE 600 MG (1,500 MG)-VITAMIN D3 400 UNIT TABLET 1 TABLET: at 08:38

## 2024-02-17 RX ADMIN — Medication 50 MCG/HR: at 20:54

## 2024-02-17 RX ADMIN — MIDAZOLAM HYDROCHLORIDE 4 MG/HR: 1 INJECTION, SOLUTION INTRAVENOUS at 20:53

## 2024-02-17 RX ADMIN — LOPERAMIDE HYDROCHLORIDE 2 MG: 2 TABLET ORAL at 11:12

## 2024-02-17 RX ADMIN — PREDNISONE 2.5 MG: 2.5 TABLET ORAL at 22:52

## 2024-02-17 RX ADMIN — HEPARIN SODIUM 5000 UNITS: 5000 INJECTION, SOLUTION INTRAVENOUS; SUBCUTANEOUS at 22:20

## 2024-02-17 RX ADMIN — SODIUM BICARBONATE 50 MEQ: 84 INJECTION INTRAVENOUS at 20:30

## 2024-02-17 RX ADMIN — NALOXONE HYDROCHLORIDE 0.2 MG: 0.4 INJECTION, SOLUTION INTRAMUSCULAR; INTRAVENOUS; SUBCUTANEOUS at 14:53

## 2024-02-17 RX ADMIN — Medication: at 12:30

## 2024-02-17 RX ADMIN — Medication 40 MG: at 08:43

## 2024-02-17 RX ADMIN — VANCOMYCIN HYDROCHLORIDE 1000 MG: 1 INJECTION, SOLUTION INTRAVENOUS at 22:21

## 2024-02-17 RX ADMIN — PREDNISONE 5 MG: 5 TABLET ORAL at 08:39

## 2024-02-17 RX ADMIN — PROPOFOL 50 MG: 10 INJECTION, EMULSION INTRAVENOUS at 20:49

## 2024-02-17 RX ADMIN — MAGNESIUM SULFATE HEPTAHYDRATE 2 G: 40 INJECTION, SOLUTION INTRAVENOUS at 22:11

## 2024-02-17 RX ADMIN — CHLORHEXIDINE GLUCONATE 15 ML: 1.2 SOLUTION ORAL at 22:20

## 2024-02-17 RX ADMIN — CYANOCOBALAMIN TAB 500 MCG 500 MCG: 500 TAB at 08:38

## 2024-02-17 RX ADMIN — PROPOFOL 30 MG: 10 INJECTION, EMULSION INTRAVENOUS at 20:50

## 2024-02-17 RX ADMIN — PHENYLEPHRINE HYDROCHLORIDE 100 MCG: 10 INJECTION INTRAVENOUS at 20:50

## 2024-02-17 RX ADMIN — PROPOFOL 5 MCG/KG/MIN: 10 INJECTION, EMULSION INTRAVENOUS at 21:34

## 2024-02-17 RX ADMIN — PHENYLEPHRINE HYDROCHLORIDE 100 MCG: 10 INJECTION INTRAVENOUS at 20:53

## 2024-02-17 RX ADMIN — MIDAZOLAM 2 MG: 1 INJECTION INTRAMUSCULAR; INTRAVENOUS at 20:47

## 2024-02-17 RX ADMIN — ACETAMINOPHEN 1000 MG: 325 SOLUTION ORAL at 08:51

## 2024-02-17 RX ADMIN — SODIUM BICARBONATE 50 MEQ: 84 INJECTION INTRAVENOUS at 20:43

## 2024-02-17 RX ADMIN — METOPROLOL TARTRATE 25 MG: 100 TABLET, FILM COATED ORAL at 08:43

## 2024-02-17 RX ADMIN — NOREPINEPHRINE BITARTRATE 0.05 MCG/KG/MIN: 0.02 INJECTION, SOLUTION INTRAVENOUS at 20:52

## 2024-02-17 RX ADMIN — Medication 40 MG: at 22:10

## 2024-02-17 RX ADMIN — PHENYLEPHRINE HYDROCHLORIDE 100 MCG: 10 INJECTION INTRAVENOUS at 20:55

## 2024-02-17 RX ADMIN — PHENYLEPHRINE HYDROCHLORIDE 100 MCG: 10 INJECTION INTRAVENOUS at 20:49

## 2024-02-17 RX ADMIN — ACETAMINOPHEN 1000 MG: 325 SOLUTION ORAL at 22:06

## 2024-02-17 RX ADMIN — TACROLIMUS 4 MG: 5 CAPSULE ORAL at 08:43

## 2024-02-17 RX ADMIN — SODIUM CHLORIDE 250 ML: 9 INJECTION, SOLUTION INTRAVENOUS at 12:31

## 2024-02-17 RX ADMIN — HEPARIN SODIUM 5000 UNITS: 5000 INJECTION, SOLUTION INTRAVENOUS; SUBCUTANEOUS at 11:02

## 2024-02-17 RX ADMIN — Medication 50 MCG: at 21:55

## 2024-02-17 RX ADMIN — PHENYLEPHRINE HYDROCHLORIDE 100 MCG: 10 INJECTION INTRAVENOUS at 20:51

## 2024-02-17 RX ADMIN — OXYCODONE HYDROCHLORIDE 5 MG: 5 SOLUTION ORAL at 05:31

## 2024-02-17 RX ADMIN — PIPERACILLIN AND TAZOBACTAM 2.25 G: 2; .25 INJECTION, POWDER, FOR SOLUTION INTRAVENOUS at 22:20

## 2024-02-17 RX ADMIN — SEVELAMER CARBONATE 0.8 G: 800 POWDER, FOR SUSPENSION ORAL at 08:43

## 2024-02-17 ASSESSMENT — ACTIVITIES OF DAILY LIVING (ADL)
ADLS_ACUITY_SCORE: 28
ADLS_ACUITY_SCORE: 31
ADLS_ACUITY_SCORE: 31
ADLS_ACUITY_SCORE: 32
ADLS_ACUITY_SCORE: 31
ADLS_ACUITY_SCORE: 31
ADLS_ACUITY_SCORE: 28

## 2024-02-17 NOTE — PROGRESS NOTES
Lung Transplant Consult Follow Up Note   February 17, 2024            Assessment and Plan:   Sofie Rodriguez is a 63 year old female with h/o bilateral lung transplant for COPD on 6/28/22 with course complicated by post-operative hemidiaphragm palsy, recurrent PNAs, positive DSA, EBV viremia, hypogammaglobulinemia, severe gastroparesis s/p G/J tube placement 7/27/22 with pyloric botox 1/25/23, GI bleed 2/2 pyloric ulcer, hemobilia s/p ERCP and MRCP, chronic diarrhea, recurrent C diff colitis, and ESRD on HD. Admitted May 2023 for FTT, supposed to be readmitted in July for FTT, but refused. Admitted to OSH 12/4-12/31 in December for right hip fracture s/p ORIF, now with significant deconditioning and ongoing severe malnutrition with gastroparesis, small bowel hypermotility and failure to tolerate any tube feeds. After extensive discussion with the dietician, GI and ultimately convincing the patient, the patient was admitted on 2/10 for initiation of TPN/lipids. She is s/p port placement with persistent pain at port site. GI recommends trickle feeds for maintaining bowel and CT enterography to look for structural abnormalities (unable due to large bolus enteral contrast required for the study). She appears to be volume overloaded based on chest x-ray and exam. More lethargic today (2/18).     Recommendations:   - Daily dialysis for volume removal  - Check blood cultures, VBG and ammonia (ammonia ordered) to assess lethargy.  - Consider brain imaging to assess lethargy.  - Consider chest CT to assess worsening opacities on CXR.  - Strict I/Os and daily weights  - Incentive spirometry and aerobika  - O2 to keep SaO2 > 92%  - Check Prospera (ordered)  - Patient should be wearing 2 L O2 at night  - Continue current immunosuppression, check tacro level in AM.  - Strongly consider transfer to [facility] for multiple complex lung transplant related needs (discussed with hospitalist and bed control called).       Lethargy:  Increased lethargy noted AM 2/17. Initially responded to narcan (received oxycodone this morning) but worse again this afternoon. Etiology unclear.  - Check VBG   - Check ammonia (ordered)  - Check blood cultures  - Consider brain imaging (mechanical fall prior to admission).       S/p bilateral lung transplant:   Right hemidiaphragm palsy:   Suspected CARLEE: seen in clinic last week, severely deconditioned, complicated by above. CMV 2/7 negative. ImmuKnow 108 and cfDNA 0.12 on 1/10. CT 2/7 with decreased MARLON opacities, new tree in bud RLL opacities without new symptoms. PFTs unchanged in clinic (but ATS not met), remain significantly below her baseline (1.4-1.5L). Notes increased dyspnea since the Port-A-Cath placement.  Also reports a slight increase in cough. Currently on O2 for comfort. Review of notes indicate patient should be on 2 L o2 at night from prior overnight O2 study in Jan 2023.   - 2/17 CXR reviewed by me, Increased bilateral opacities, L>R, c/w previous.   - With worsened CXR and decreased LOC, consider chest CT (non-contrast) to better characterize CXR abnormalities.  - BNP >70,000  - Procal 0.38  - DSA 2/7 with persistent DQ B2, MFI increased to 6966, see below  - Check Prospera to evaluate significance of positive DSA (ordered)  - IS and Aerobika while awake  - Overnight oximetry with SaO2 < 88% for 6:46 (min:sec). O2 initiated for SaO2 77%. 95-99% on2L NC.  Continue nighttime oxygen in the hospital.  Repeat overnight oximetry on room air prior to discharge to qualify for home O2.  - schedule follow up with sleep to discuss possible CPAP given recommendations from August sleep study     Immunosuppression:  - Tacrolimus 4 mg qAM and tacrolimus 4.5 mg qPM. Goal level 7-9. Tacrolimus level on 2/15 of 9.2 at 12 hours  - Recheck a tough level tomorrow (ordered)  - Prednisone 5 mg/2.5 mg     Prophylaxis:   - Dapsone 50 mg q MWf for PJP ppx     Positive DSA: no prior treatment for AMR, has been watched  for quite some time given no pulmonary symptoms, prior PFTs stability and significant and ongoing failure to thrive. Last DSA improved to 5723, however will ongoing lower PFTs, may need to consider AMR treatment, however, unclear how she would tolerate.   -  DSA 2/7 with persistent DQ B2, MFI increased to 6966, see below  - Check Prospera to evaluate significance of positive DSA (ordered)     CKD: Dialysis dependent.  Now with increased dyspnea, crackles on exam and increased vascular markings on chest x-ray.  Suspect volume overload secondary to initiation of TPN.  -BNP >70,000.  -Dialysis today.  -May require daily dialysis until euvolemic again  -Monitor strict ins and outs and daily weights.(Reordered)     EBV viremia: last level of 96K (2/7), CT c/a/p (2/7) without adenopathy.  - EBV recheck in 3 months     Severe protein calorie malnutrition:  FTT:   Gastroparesis s/p PEG/J s/p botox and G-POEM:   SB Hypomotility  Pyloric ulcer:  Chronic nausea and osmotic diarrhea:  SIBO s/p rifaximin:   Recurrent C diff colitis: chronic diarrhea since transplant with recurrent episodes of C diff. GI previously consulted, felt stools consistent with osmotic diarrhea. Over the last year has lost 40 lbs, unable to tolerate any combination of TF, most recently elemental formula. Normal fecal elastase last May. Following with Dr. Navarro who feels her main two issues are vagal injury induced gastroparesis and probably small bowel hypomotility. Her intolerance to J-tube feeds suggests the latter to be a significant issue (notes in a message that there are no motility experts at the  and he is limited in what can be offered). Now s/p port placement with TPN and lipids. GI recommending trickle feeds and CT to assess for structural issues.   - Recs per primary and GI  -Patient will not tolerate CT enterography according to chart notes as she will require 1500 mL enteral contrast bolus which she is unlikely to tolerate given her  gastroparesis and reduced bowel function.     We appreciate the excellent care provided by the [facility] team.  Recommendations communicated via this note.  Will continue to follow along closely, please do not hesitate to call with any questions or concerns.     Ajay Castillo MD  [phone]          Interval History:     Reported to be lethargic this morning after oxycodone. Improved with narcan.   Currently in dialysis.  Somnolent but arousable. Denies dyspnea, chest pain, nausea or abd pain but otherwise can not contribute reliably to interval history  or ROS.          Medications:      acetaminophen  1,000 mg Per J Tube TID    calcium carbonate-vitamin D  1 tablet Per J Tube TID w/meals    cyanocobalamin  500 mcg Per Feeding Tube Daily    dapsone  50 mg Per J Tube Q Mon Wed Fri AM    heparin ANTICOAGULANT  5,000 Units Subcutaneous Q12H    lipids plant base  250 mL Intravenous Once per day on Monday Wednesday Friday    metoprolol  25 mg Per J Tube BID    multivitamin w/minerals  1 tablet Oral or Feeding Tube Daily    pantoprazole  40 mg Per J Tube BID    predniSONE  5 mg Per J Tube QAM    And    predniSONE  2.5 mg Per J Tube QPM    sevelamer carbonate (RENVELA)  0.8 g Oral BID    tacrolimus  4 mg Per J Tube QAM    And    tacrolimus  4.5 mg Per J Tube QPM     albumin human, calcium carbonate, dextrose, dextrose, lidocaine 4%, lidocaine (buffered or not buffered), lidocaine (buffered or not buffered), lidocaine (buffered or not buffered), lidocaine-prilocaine, loperamide, naloxone **OR** naloxone **OR** naloxone **OR** naloxone, ondansetron **OR** ondansetron, senna-docusate **OR** senna-docusate, sodium chloride (PF), sodium chloride 0.9%, - MEDICATION INSTRUCTIONS -         Physical Exam:   Temp:  [97.8  F (36.6  C)-99  F (37.2  C)] 98.5  F (36.9  C)  Pulse:  [77-97] 79  Resp:  [16-23] 23  BP: ()/(49-63) 129/63  Cuff Mean (mmHg):  [74] 74  SpO2:  [95 %-100 %] 100 %    Intake/Output Summary (Last 24 hours) at  2/17/2024 1322  Last data filed at 2/17/2024 0540  Gross per 24 hour   Intake 830 ml   Output 1450 ml   Net -620 ml     Constitutional:   Somnolent but arousable and in no apparent distress     Eyes:   Nonicteric, PERRL. Right periorbital ecchymosis     ENT:    oral mucosa moist without lesions     Neck:   Supple without supraclavicular or cervical lymphadenopathy     Lungs:   Diminished air flow.  Bilat lower lung crackles. No rhonchi.  No wheezes.     Cardiovascular:   Normal S1 and S2.  RRR, freq ectopic beats,  II/VI sys murmur. No gallop. No rub.     Abdomen:   NABS, soft, nondistended, nontender.  No HSM.     Musculoskeletal:   3+  edema     Neurologic:   Somnolent but arousable     Skin:   Warm, dry.  No rash on limited exam.             Data:   All laboratory and imaging data reviewed.    Results for orders placed or performed during the hospital encounter of 02/10/24 (from the past 24 hour(s))   Glucose by meter   Result Value Ref Range    GLUCOSE BY METER POCT 184 (H) 70 - 99 mg/dL   Glucose by meter   Result Value Ref Range    GLUCOSE BY METER POCT 165 (H) 70 - 99 mg/dL   Glucose by meter   Result Value Ref Range    GLUCOSE BY METER POCT 180 (H) 70 - 99 mg/dL   XR Chest Port 1 View    Narrative    EXAM: XR CHEST PORT 1 VIEW  2/17/2024 10:02 AM     HISTORY:  decreased mental status, labored breathing.       COMPARISON:  2/16/2024    TECHNIQUE: Portable AP semiupright view of the chest    FINDINGS:   Right IJ CVC tip terminates over the cavoatrial junction. Postsurgical  changes of bilateral lung transplantation with intact clamshell  sternotomy wires..    Stable enlarged cardiac silhouette. Unchanged blunting of the  costophrenic angles. Stable small left pleural effusion. Trace right  effusion. No pneumothorax. Increased mixed opacities in the lung  fields bilaterally, left greater than right.      Impression    IMPRESSION:  1. Increased mixed opacities in the lung fields bilaterally, left  greater than  right, which may represent edema, atelectasis, or  infection.  2. Stable small left and trace right pleural effusion.    I have personally reviewed the examination and initial interpretation  and I agree with the findings.    ISIDRO NOVAK MD         SYSTEM ID:  [id number]   Magnesium   Result Value Ref Range    Magnesium 1.8 1.7 - 2.3 mg/dL   Phosphorus   Result Value Ref Range    Phosphorus 3.6 2.5 - 4.5 mg/dL   Comprehensive metabolic panel   Result Value Ref Range    Sodium 130 (L) 135 - 145 mmol/L    Potassium 3.9 3.4 - 5.3 mmol/L    Carbon Dioxide (CO2) 25 22 - 29 mmol/L    Anion Gap 11 7 - 15 mmol/L    Urea Nitrogen 31.7 (H) 8.0 - 23.0 mg/dL    Creatinine 2.86 (H) 0.51 - 0.95 mg/dL    GFR Estimate 18 (L) >60 mL/min/1.73m2    Calcium 8.5 (L) 8.8 - 10.2 mg/dL    Chloride 94 (L) 98 - 107 mmol/L    Glucose 233 (H) 70 - 99 mg/dL    Alkaline Phosphatase 79 40 - 150 U/L    AST 15 0 - 45 U/L    ALT <5 0 - 50 U/L    Protein Total 5.7 (L) 6.4 - 8.3 g/dL    Albumin 3.1 (L) 3.5 - 5.2 g/dL    Bilirubin Total 0.2 <=1.2 mg/dL     *Note: Due to a large number of results and/or encounters for the requested time period, some results have not been displayed. A complete set of results can be found in Results Review.

## 2024-02-17 NOTE — PROGRESS NOTES
Date: 2/17/2024  Time: 1600     Data:  Pre Wt:   49.1 kg  Desired Wt:   To be established  Post Wt:   kg (estimated)  Weight change: - 2 kg  Ultrafiltration - Post Run Net Total Removed (mL):  2000 ml  Vascular Access Status: Fistula patent  Dialyzer Rinse:  Light  Total Blood Volume Processed: 46.8 L   Total Dialysis (Treatment) Time:   2.30 Hrs  Dialysate Bath: K 2, Ca 2.5  Heparin: Heparin: None     Lab:   Yes      Interventions:  Dialysis done through left AV Fistula using 15 gauge needles  UF set to 2 Liters, accommodating priming and rinse back volumes  ,   Treatment has ended safely and  blood is rinsed back completely  Decannulation done post HD, hemostasis is achieved in 10 minutes  Pressure dressing is applied, to be removed after 4-6 hours  Report given to PCN, sent back to [facility] room in stable condition     Assessment:  A/O x 2, calm and cooperative, denies pain, pt lethargic throughout dialysis treatment, oxygen fluctuating 98-85, now she is on 4L with 94%. Pt feels tried and cold, warm blanket given, tx ended 10 early d/t low BP. After rinsed back BP improved and stable.   Lung sounds diminished anterior  left arm AV Fistula has good thrill and bruit                Plan:    Per Renal team    Pernell Mg, MISAELN, RN  Acute Dialysis RN  [facility]

## 2024-02-17 NOTE — PROGRESS NOTES
"  Nephrology Progress Note  02/17/2024         Assessment & Recommendations:     64yo F with ESKD, lung transplant in 6/28/22, and gastroparesis admitted with failure to thrive and unintentional weight loss. She also reports LE edema and L arm edema at the elbow. She reports feeling \"sick\" whenever she has tube feeds or oral food intake. She has nausea but no vomiting. Neph is consulted for ESRD management    # ESKD - TTS, LUE AVG, 3hr, 45.5kg, [facility], Dr. Andres Fairbanks. She has been losing weight and now even below her recent set EDW.  - HD tomorrow per TTS for 3 hrs and plan 1-2 L remoal  - Renal panel on TTS  - Using Lidocaine cream prior to cannulation    # Edema due to third spacing due to hypoalbuminemia  - Please wrap and elevate her legs  # FTT  # Chronic diarrhea  Working-up currently. Not on any cellcept. Team is planning TPN.  - Please limit fluid < 1.5 L per day for TPN  - Now on TPN since 2/13/24  - PLEASE chart amount of TPN in the I/O  #Hypertension - off furosemide due to anuria.  BP generally 110-125/50-60  On metoprolol solution 25 mg twice a day  # Anemia 2/2 ESKD  Hemoglobin 8.6 (2/15/24) and not at goal  On Venofer 50 qwk, Mircera last dose 1/9.  - Will continue Venofer 50 mcg q week (Tuesday)  - Check CBC once a week on Tuesday  - Started Epogen 4000 U with run while she is admitted; 1st dose 2/13/24  - If Hb now improve then dose needs to be increased next week  # MBD  Phos 3.6 and at goal of <5.5  Corrected calcium 9.1  - On home sevelamer 800 mg TID.   - Reduced Sevelamer to 800 mg BID since 2/12/24     Recommendations were communicated to primary team via  note     Claribel Horn MD   Division of Renal Disease and Hypertension  [employer]  Vocera Web Console    Interval History :   Nursing and provider notes from last 24 hours reviewed.  In the last 24 hours Sofie Rodriguez has remained hospitalized, receiving TPN. Had short run HD yesterday with 1.4 L UF as she was " hypervolemic. No N/V, no CP, breathing OK, no fevers but is cold. No edema of legs.    Review of Systems:   I reviewed the following systems:  10 system are negative except as mentioned above    Physical Exam:   I/O last 3 completed shifts:  In: 1010 [P.O.:785; I.V.:30; NG/GT:195]  Out: 1450 [Other:1450]   /65   Pulse 85   Temp 98.5  F (36.9  C) (Oral)   Resp 14   Wt 49.1 kg (108 lb 4.8 oz)   SpO2 99%   BMI 19.18 kg/m       GENERAL APPEARANCE: Alert, NAD, pleasant  EYES:  No scleral icterus, pupils equal; + vinayak orbital and facial ecchymosis  PULM: lungs clear to auscultation bilaterally, equal air movement, no clubbing  CV: regular rhythm, normal rate, no rub     - no edema  INTEGUMENT: no cyanosis, no rash  NEURO:  no asterixis   Access Left AVG: + Bruit and thrill    Labs:   All labs reviewed by me  Electrolytes/Renal -   Recent Labs   Lab Test 02/17/24  1243 02/17/24  0636 02/16/24  2353 02/16/24  1820 02/16/24  0703 02/15/24  0737 02/15/24  0620   *  --   --   --  130*  --  133*   POTASSIUM 3.9  --   --   --  4.0  --  4.8   CHLORIDE 94*  --   --   --  93*  --  98   CO2 25  --   --   --  27  --  27   BUN 31.7*  --   --   --  32.2*  --  34.3*   CR 2.86*  --   --   --  3.14*  --  3.95*   * 180* 165*   < > 181*   < > 120*   ESTUARDO 8.5*  --   --   --  8.5*  --  8.5*   MAG 1.8  --   --   --  1.7  --  1.9   PHOS 3.6  --   --   --  4.4  --  4.2    < > = values in this interval not displayed.       CBC -   Recent Labs   Lab Test 02/16/24  0703 02/15/24  0620 02/14/24  0807 02/13/24  0700 02/12/24  0756   WBC  --   --  4.5 4.5 5.3   HGB  --  8.6* 8.2* 9.1* 9.2*     --  175 191 216       LFTs -   Recent Labs   Lab Test 02/17/24  1243 02/16/24  0703 02/14/24  0807   ALKPHOS 79 66 62   BILITOTAL 0.2 <0.2 0.2   ALT <5 <5 8   AST 15 17 17   PROTTOTAL 5.7* 5.6* 5.4*   ALBUMIN 3.1* 3.1* 3.2*       Iron Panel -   Recent Labs   Lab Test 09/26/22  0555 09/03/22  1039 08/24/22  0810   IRON 54 21* 41    MENG 22 9* 21   CARLOS 769* 343* 334*         Imaging:  All imaging studies reviewed by me.     Current Medications:   acetaminophen  1,000 mg Per J Tube TID    calcium carbonate-vitamin D  1 tablet Per J Tube TID w/meals    cyanocobalamin  500 mcg Per Feeding Tube Daily    dapsone  50 mg Per J Tube Q Mon Wed Fri AM    heparin ANTICOAGULANT  5,000 Units Subcutaneous Q12H    lipids plant base  250 mL Intravenous Once per day on Monday Wednesday Friday    metoprolol  25 mg Per J Tube BID    multivitamin w/minerals  1 tablet Oral or Feeding Tube Daily    pantoprazole  40 mg Per J Tube BID    predniSONE  5 mg Per J Tube QAM    And    predniSONE  2.5 mg Per J Tube QPM    sevelamer carbonate (RENVELA)  0.8 g Oral BID    tacrolimus  4 mg Per J Tube QAM    And    tacrolimus  4.5 mg Per J Tube QPM      dextrose      dextrose      parenteral nutrition - ADULT compounded formula      parenteral nutrition - ADULT compounded formula 60 mL/hr at 02/16/24 2115    sodium chloride 0.9%      - MEDICATION INSTRUCTIONS -       Claribel Horn MD

## 2024-02-17 NOTE — PROGRESS NOTES
Brief Hospital Medicine Note:     Paged regarding rapid response following VBG resulting with severe respiratory acidosis with pH 7.15, CO2 98, O2 77.2 and Bicarb of 34. ICU team, RT, RN and Medicine all at bedside. Patient is alert and communicating, but given severity of acidosis will require BiPAP and closer monitoring in ICU. Agreed with plan to transfer to ICU for continuous BiPAP. Per chart review, appeared that Pulmonary had recommended patient transfer to [place] given multiple complex lung transplant related needs. Given patient is currently unstable, agreed ICU to stabilize prior to transfer to [place]. Discussed with ICU and RN and agreed with this plan.     Caprice Contreras PA-C  Hospitalist Service  Contact information available via [facility] Paging/Directory

## 2024-02-17 NOTE — PLAN OF CARE
Goal Outcome Evaluation:      Rapid response called at 0530 due to critical lab following VBG results. PH 7.15 and PC02, 98, 02 77.2 and bicarb 34.Patient assessed and transferred to ICU.

## 2024-02-17 NOTE — PLAN OF CARE
Goal Outcome Evaluation:    1500 to 1750 RIYA Pelaez  At shift change patient was still at dialysis (T,T,S). Nurse informed that Ammonia level was drawn. Lab called back to update that they couldn't use sample because it had to be sent on ice. Writer informed dialysis but they were unable to redraw. Writer waited until patient was on unit to get labs drawn. Patient arrived to unit around 1630.  called to redraw labs. Writer was informed that patient had another BM while at dialysis. Patients BP was running soft. Pulmonary provider paged.     0448 PM  Lab is unable to get [facility] as there are no tubes. Is it okay if they get it tomorrow? This is for 526 on 5MS. RIYA Pelaez [id number].    Response: provider stated it is okay to redraw this lab tomorrow. Lab should come back when they have tubes to redraw.     While assessing patient, she was still lethargic. Difficult to arouse patient. Patient was alert and oriented only to self. Patient could not remember what day it was or where she was. Patient on 4L of O2. Pulse oximetry monitored, patient was at 96%. Patient has scattered bruising to all extremities. Left arm av fistula. CVC to right chest. Noticeable bruising to right side of face from fall patient had 2 days prior to admission. TPN running at 60 ml/hr. Peg tube intact, patient receiving 10 ml/hr of tube feeding. Edema to left arm and BLE. Writer was going to administer narcan but received critical results and paged the provider.      0526PM   526 on 5MS results came back. PH was 7.15 and PCO2 was 98. Pt is still lethargic and is A/Ox1. RIYA Pelaez [id number].  Response: Provider called back and instructed to transfer patient to ICU and call Rapid response.    0530PM   Rapid response called. Per provider, patient transferred to ICU. Report given to ICU nurse RIYA Madrid.

## 2024-02-17 NOTE — PROGRESS NOTES
IM Hospitalist F/U     Please see my note from earlier today.  Patient was noted to be very lethargic this morning few hours after receiving oxycodone.  Responded well to 0.2 mg IV Narcan.  Stat labs including VBG, CBC ordered this morning still have not been done.  Patient was seen by Dr. Castillo on the dialysis unit this afternoon, and he contacted me noting that she was once again quite lethargic.  She has continued to oxygenate well on 2 L, has remained afebrile.  Procalcitonin yesterday was normal.  This is her second consecutive day of dialysis due to severe volume overload, likely precipitated by recent initiation of TPN.    I saw the patient at dialysis with float nurse, administered Narcan 0.2 mg IV, patient promptly became more alert and moving around the bed.  Complained of need to have a bowel movement.  She remains confused but oxygenating well on 2 L.  She has had no cough.  Stat labs ordered this morning still have not been done.  She did have LFTs and BMP obtained on dialysis which was stable aside from mild hyponatremia.    Assessment/plan:  Lethargy/hypersomnolence: Likely multifactorial, including recent oxycodone for right Port-A-Cath site pain.  No evidence of infection clinically.  Procalcitonin has been normal, afebrile with no observed cough.  Oxygenating well on 2 L even when lethargic.  Has responded twice to IV Narcan with no focal neurological deficits once awakened.  Still confused/disoriented, ongoing the past 1-2 days.  CXR this morning again showed marked vascular congestion, cannot rule out pneumonia but clinically less likely.  Does not appear to have aspirated.  -Avoid opiates, CNS depressants if at all possible  -Stat VBG, blood cultures, CBC, ammonia level ordered and sent from dialysis, results pending  -If evidence of CO2 retention on VBG, would request RT to see for BiPAP support and serial VBG monitoring until stabilized.  -Order placed to keep Narcan syringe at bedside for  urgent use    Jesús Lopez MD

## 2024-02-17 NOTE — PROGRESS NOTES
[facility]    Medicine Progress Note - Hospitalist Service, GOLD TEAM 19    Date of Admission:  2/10/2024    Assessment & Plan   Sofie Rodriguez is a 63 year old female admitted on 2/10/2024.  She has a history of bilateral lung transplant, ESRD on HD (T/Th/Sat), gastroparesis s/p PEG/J, malnutrition, recent right femoral fracture s/p ORIF in [facility] presenting as a direct admit from home to the hospital due to concerns of failure to thrive and small bowel dysmotility.  Of note, patient recently had a mechanical fall at home about 2 days prior to this admission.    Decreased LOC:  Patient noted to be more lethargic, generally weak and more difficult to arouse in a.m. 2/17.  Had received oxycodone for right Port-A-Cath pain at 5:31 AM.  Continues to oxygenate well on 2 L nasal cannula, blood pressure has remained stable and has been afebrile.  On exam, patient was arousable to voice, but lethargic and confused, was able to  with both hands and wiggle her toes to command.  Stat CXR, VBG, CBC ordered, CXR reviewed independently continues to show vascular congestion compared with 2/16.  Gave 1 dose of Narcan with prompt improvement, patient immediately more alert, spontaneously moving all her extremities and more conversant again.  Denies any active pain.  -Discontinue prn oxycodone, redose Narcan as needed  -Await results of VBG, CBC    Adult failure to thrive, weakness, deconditioning with falls  Unintentional 40 pound weight loss  Gastroparesis, Small bowel dysmotility, Chronic osmotic diarrhea/SIBO  S/P GJ feeding tube with intolerance of enteral nutrition due to the above  Patient lives with her daughter.  Patient had lost about 30% of body weight over the last year.  Documented weights this admission 95 lbs, in January 2023, weight was 136 lbs. Also with recent falls including right facial contusions/ecchymoses prior to this admission.  Has had previous  extensive GI assessments demonstrating gastroparesis and small bowel dysmotility.  Has had excellent appetite, but ongoing early satiety and intolerance of tube feeds and oral intake due to early satiety, bloating and nausea and discomfort.  Also chronic loose stools, felt osmotic and consistently worse with increased enteral nutritional intake.  Was admitted from home for the purpose of obtaining Port-A-Cath and initiating long-term TPN to supplement enteral intake.  Also has ESRD, on dialysis, and status post lung transplant.  Port-A-Cath placed 2/14 by IR.  Noting some nausea in a.m. 2/16, but no emesis, no abdominal pain or bloating.  Did have 1 loose stool earlier in the morning, but very typical for her when receiving enteral nutrition.  -Gastroenterology consulted, recommended CT enterography, however per radiology would require 1500 cc of enteral contrast over 1 hour which the patient would be very unlikely to tolerate.  -Per gastroenterology/RD, initiated jejunostomy drip feeding at 10 mL/hr. continue oral food as tolerated.  If recurrent abdominal symptoms with enteric nutrition, will plan to empirically treat for SIBO with a prolonged course of rifaximin 550 mg 3 times daily for 30 days  -RD, nutrition pharmacy following, continue TPN nutrition via Port-A-Cath  -Continue to monitor for refeeding syndrome with initiation of TPN, monitor daily CMP  -Discharge plan is home with home health care and [facility] infusion services.  Originally planned discharge home 2/16, but given ESRD and and the fact that the patient is new to TPN with current need for ongoing adjustments to TPN composition, discharge has been deferred until early next week.  Disposition now complicated by worsening respiratory status, volume overload.    Right Melina-cath site pain:  Patient complaining of rather severe right Port-A-Cath site pain since it was placed 2/14.  Site looks clean with some small blood oozing under dressing.  No  palpable hematoma or fluctuance, and site minimally tender.  Remains afebrile with no evidence of infection site infection.  Pain improved on scheduled Tylenol and oxycodone, but more lethargic in a.m. 2/17 following a 5:30 AM oxycodone dose.  Lethargy reversed with Narcan x 1.  Denies any active pain.  -Discontinue prn oxycodone, avoid CNS depressing medications if at all possible  -Continue Tylenol 1 g 3 times daily scheduled     HTN, Acute Volume-overload:  ECHO 2/28/23: EF 55-60%. No WMA. Normal RV size/fxn, normal PASP.  Cath 6/30/21: mild, non-obstructive CAD with 30% RCA dz, moderate PAH.   New volume overload, CHF with hypoxemia insetting of recent addition of TPN/Volume and ESRD.   -check ECHO  -volume per renal at HD    End-stage renal disease on hemodialysis  Dialysis every Tuesday, Thursday, Saturday per nephrology.  Dialysis session 2/15 terminated 1 hour early due to equipment issues, and noted to have increased volume overload with vascular congestion on CXR with BNP > 70,000.  Has been receiving increased IV volume via TPN.  Received additional dialysis 2/16, with plans to resume normal dialysis schedule 2/17.  Repeat CXR 2/17 still shows marked vascular congestion, formal read pending.  -Dialysis per nephrology     Status post lung transplant, suspected CARELE, right hemidiaphragm palsy:  Complicated by postoperative hemidiaphragm palsy, recurrent pneumonias.  Was admitted from home for Port-A-Cath placement and TPN by transplant pulmonology.  Appreciate their ongoing care and recommendations.  Patient noting increasing dyspnea following placement of Port-A-Cath 2/14, and has been receiving increased volume via TPN, initially peripheral and now via Port-A-Cath.  Mild, nonproductive cough.  Last dialyzed 2/15, but session cut short 1 hour early apparently due to equipment malfunction.  CXR 2/16 shows diffuse vascular congestion, new compared with 2/7/2024 with some patchy infiltrate in the right middle,  lower lung fields. Increasing dyspnea, oxygen needs secondary to volume overload in the setting of inadequate dialysis and increased volume intake via TPN.  Received additional dialysis 2/16.  Repeat CXR 2/17 still looks wet, formal read pending.  - Continue PTA immunosuppressive agent: Prednisone 5 mg every morning, 2.5 mg every afternoon, tacrolimus 4 mg every morning, 4 mg every afternoon  - Timed tacrolimus level ordered for 2/15  -Continue PTA dapsone for PJP prophylaxis  -Discussed with Dr. Castillo of pulmonology and Dr. Boland of nephrology 2/16/2024.  Awaiting transfer to [place] per transplant pulmonology when bed available.  -Long-term should be on 2 L oxygen at night, pulmonology will be scheduling follow-up with sleep medicine to discuss possible CPAP given recommendations from prior sleep study    GERD, GI Ppx  - continue PTA Protonix    Right hip fracture s/p ORIF (December 2023), weakness, gait instability with recurrent falls, recent right facial trauma, contusions following a fall  Patient had right femur fracture in December 2023.  Underwent ORIF at OSH.  Had another mechanical fall 2 days PTA with right hip pain and extensive right facial ecchymoses.  Ecchymoses fading.  Pain improving.  X-ray right hip obtained this admission showed no evidence of acute displaced fracture.  Remains very weak and frail, with protein malnutrition as above.  Resides with her daughter.  -PT, OT    Macrocytic anemia:  Chronic.  Multifactorial including ACD, ESRD.  No bleeding recently clinically aside from facial ecchymoses.  LFTs normal, TSH normal 2022.  B12, folic acid normal 2/14/2024.  -Epogen per nephrology    DVT PPX:  -Subcu heparin    Lymphedema:  Secondary to ESRD, protein malnutrition.  Stable.  -Tubigrip's ordered  -Volume management at dialysis per nephrology          Diet: Regular Diet Adult  Adult Formula Drip Feeding: Continuous [place] Peptide 1.5; Jejunostomy; Goal Rate: Begin at 10 mL/hr and  hold; mL/hr; Do not advance tube feeding rate unless K+ is = or > 3.0, Mg++ is = or > 1.5, and Phos is = or > 1.9  parenteral nutrition - ADULT compounded formula    DVT Prophylaxis: Heparin SQ  Dong Catheter: Not present  Lines: PRESENT      CVC Right Tunneled-Site Assessment: WDL except;Ecchymotic;Painful  CVC Single Lumen Right Internal jugular Non - valved (open ended);Tunneled;Power injectable-Site Assessment: WDL except;Drainage      Cardiac Monitoring: None  Code Status: Full Code      Clinically Significant Risk Factors              # Hypoalbuminemia: Lowest albumin = 3.1 g/dL at 2/16/2024  7:03 AM, will monitor as appropriate             # Severe Malnutrition: based on nutrition assessment      # Financial/Environmental Concerns: none           Jesús Lopez MD  Hospitalist Service, GOLD TEAM [address] [place] [address]  Securely message with [employer] (more info)  Text page via [facility] Paging/Directory   See signed in provider for up to date coverage information  ______________________________________________________________________    Interval History   Patient noted to be more lethargic, less responsive in a.m. 2/17, had received oxycodone 5 mg at 5:30 AM for right Port-A-Cath site pain.  On exam, patient lying in bed, appears to be breathing comfortably, blood pressure normal, oxygenating 97% on 2 L nasal cannula.  She was arousable to voice, but confused and very somnolent.  She was able to  with both hands, wiggle her toes to command.  Administered IV Narcan with prompt improvement, patient much more awake and purposefully moving around in bed with symmetric strength.  Patient denies any portacatheter or other pain at this time.  Has remained afebrile.    Physical Exam   Vital Signs: Temp: 98.5  F (36.9  C) Temp src: Oral BP: 129/63 Pulse: 97   Resp: 18 SpO2: 97 % O2 Device: Nasal cannula Oxygen Delivery: 2 LPM  Weight: 108 lbs 4.8 oz  General: Initially  somnolent, lying in bed but arousable to voice and able to follow some simple commands with her hands and toes.  Following IV Narcan, much more alert and conversant, moving purposefully in bed.  HEENT: Diffuse right facial and upper neck ecchymoses, continuing to fade.  Small hematoma on the right lid, improving.  OP moist and clear.  No thrush.  Chest: Diffuse bilateral rales in the lower and mid lung fields, with some mild rales in the upper lung fields.  Unchanged.  No wheezes.  No rhonchi.  CV: RRR.  1/6 systolic murmur at LLSB  Abdomen: NABS.  Thin.  Soft, nontender, nondistended.  GJ tube site clean.  Extremities: 2+ BLE and sacral edema.  1+ left upper extremity edema.  Left upper extremity fistula thrill palpable.  Skin: Right chest Port-A-Cath site clean with a small amount of blood beneath dressing, no palpable hematoma, fluctuance or visible erythema.  No significant tenderness.  Neuro: Generally weak but nonfocal.    65 MINUTES SPENT BY ME on the date of service doing chart review, history, exam, documentation & further activities per the note.      Data      CMP  Recent Labs   Lab 02/17/24  0636 02/16/24  2353 02/16/24  1820 02/16/24  0703 02/15/24  0737 02/15/24  0620 02/14/24  1636 02/14/24  0807 02/13/24  1226 02/13/24  0700 02/12/24  1845 02/12/24  0756   NA  --   --   --  130*  --  133*  --  137  --  140  --  144   POTASSIUM  --   --   --  4.0  --  4.8  --  4.0  --  5.0  --  4.0   CHLORIDE  --   --   --  93*  --  98  --  101  --  104  --  107   CO2  --   --   --  27  --  27  --  26  --  23  --  26   ANIONGAP  --   --   --  10  --  8  --  10  --  13  --  11   * 165* 184* 181*   < > 120*   < > 98   < > 100*   < > 177*   BUN  --   --   --  32.2*  --  34.3*  --  20.3  --  33.4*  --  27.1*   CR  --   --   --  3.14*  --  3.95*  --  3.33*  --  5.41*  --  4.39*   GFRESTIMATED  --   --   --  16*  --  12*  --  15*  --  8*  --  11*   ESTUARDO  --   --   --  8.5*  --  8.5*  --  8.4*  --  8.4*  --  8.9   MAG   --   --   --  1.7  --  1.9  --  1.9  --  2.4*  --  2.4*   PHOS  --   --   --  4.4  --  4.2  --  3.3  --  4.7*  --  3.5   PROTTOTAL  --   --   --  5.6*  --   --   --  5.4*  --  5.5*  --  5.9*   ALBUMIN  --   --   --  3.1*  --   --   --  3.2*  --  3.1*  --  3.6   BILITOTAL  --   --   --  <0.2  --   --   --  0.2  --  0.2  --  0.3   ALKPHOS  --   --   --  66  --   --   --  62  --  56  --  67   AST  --   --   --  17  --   --   --  17  --  31  --  17   ALT  --   --   --  <5  --   --   --  8  --  8  --  7    < > = values in this interval not displayed.     CBC  Recent Labs   Lab 02/16/24  0703 02/15/24  0620 02/14/24  0807 02/13/24  0700 02/12/24  0756   WBC  --   --  4.5 4.5 5.3   RBC  --   --  2.23* 2.27* 2.56*   HGB  --  8.6* 8.2* 9.1* 9.2*   HCT  --   --  27.5* 28.1* 30.9*   MCV  --   --  123* 124* 121*   MCH  --   --  36.8* 40.1* 35.9*   MCHC  --   --  29.8* 32.4 29.8*   RDW  --   --  15.5* 15.8* 15.4*     --  175 191 216     INR  Recent Labs   Lab 02/13/24  1658 02/13/24  0700   INR 0.92 0.97     Arterial Blood GasNo lab results found in last 7 days.Venous Blood Gas  No lab results found in last 7 days.  Hemoglobin A1C   Date Value Ref Range Status   07/11/2023 <4.2 <5.7 % Final     Comment:     Normal <5.7%   Prediabetes 5.7-6.4%    Diabetes 6.5% or higher     Note: Adopted from ADA consensus guidelines.   11/11/2022 5.2 <5.7 % Final     Comment:     Normal <5.7%   Prediabetes 5.7-6.4%    Diabetes 6.5% or higher     Note: Adopted from ADA consensus guidelines.   06/28/2022 5.8 (H) <5.7 % Final     Comment:     Normal <5.7%   Prediabetes 5.7-6.4%    Diabetes 6.5% or higher     Note: Adopted from ADA consensus guidelines.   11/13/2020 5.1 <5.7 % Final     Results for orders placed or performed during the hospital encounter of 02/10/24   XR Hip Right 2-3 Views    Narrative    EXAM: XR HIP RIGHT 2-3 VIEWS  LOCATION: [facility]  DATE: 2/12/2024    INDICATION: Recent  right hip ORIF, also experienced recent fall. Complaining of pain.  COMPARISON: 06/14/2021.      Impression    IMPRESSION: Postop changes fixation right proximal femur securing a fracture. Healing is incomplete. Mild underlying degenerative changes. Alignment is near-anatomic. No evidence of an acute displaced fracture elsewhere throughout the right hip.   Partially visible gastrojejunostomy.   XR Chest 2 Views    Narrative    XR CHEST 2 VIEWS  2/16/2024 8:32 AM     HISTORY:  H/o bilateral lung transplant, now using some O2, assess for  pathology       COMPARISON: CT [facility] 2/7/2014     TECHNIQUE: Upright PA and Lateral radiographs of the chest.     FINDINGS:   Postsurgical changes of bilateral lung transplantation with intact  clam shell sternotomy wires. Right internal jugular venous catheter  terminates near the cavoatrial junction.    Cardiomediastinal silhouette is within normal limits. Trachea is  midline. Stable blunting of the bilateral costophrenic angles.  Moderate increase in the perihilar and bibasilar streaky opacities.    Percutaneous gastrojejunostomy tube partially visualized. No acute  osseous abnormalities.      Impression    IMPRESSION:  1. Moderate increase in the perihilar and bibasilar streaky opacities  are suggestive of evolving atelectasis and/or edema.  2. Stable small left pleural effusion and trace right pleural  effusion.  3. No appreciable pneumothorax.    I have personally reviewed the examination and initial interpretation  and I agree with the findings.    JOHANN MORAES MD         SYSTEM ID:  [id number]   XR Chest Port 1 View    Narrative    EXAM: XR CHEST PORT 1 VIEW  2/17/2024 10:02 AM     HISTORY:  decreased mental status, labored breathing.       COMPARISON:  2/16/2024    TECHNIQUE: Portable AP semiupright view of the chest    FINDINGS:   Right IJ CVC tip terminates over the cavoatrial junction. Postsurgical  changes of bilateral lung transplantation with intact clamshell  sternotomy  wires..    Stable enlarged cardiac silhouette. Unchanged blunting of the  costophrenic angles. Stable small left pleural effusion. Trace right  effusion. No pneumothorax. Increased mixed opacities in the lung  fields bilaterally, left greater than right.      Impression    IMPRESSION:  1. Increased mixed opacities in the lung fields bilaterally, left  greater than right, which may represent edema, atelectasis, or  infection.  2. Stable small left and trace right pleural effusion.    I have personally reviewed the examination and initial interpretation  and I agree with the findings.    ISIDRO NOVAK MD         SYSTEM ID:  [id number]     *Note: Due to a large number of results and/or encounters for the requested time period, some results have not been displayed. A complete set of results can be found in Results Review.

## 2024-02-17 NOTE — PLAN OF CARE
Goal Outcome Evaluation:         VS: /59 (BP Location: Right arm, Cuff Size: Adult Small)   Pulse 72   Temp 98.6  F (37  C) (Oral)   Resp 16   Wt 49.1 kg (108 lb 4.8 oz)   SpO2 99%   BMI 19.18 kg/m       O2: 99% RA   Output: adequate   Last BM: 2/16/2024   Activity: As tolerated; independent/assist x1 w/walker   Skin: Scattered bruises from head to toe   Pain: denies   CMS: Denies SOB, chest pain, headache   Dressing: Clean, dry intact   Diet: reg. Diet, poor appetite- continuous TF @ 10 ml/hr   LDA: R chest single lumen PICC- running TPN   Equipment: IV pole, feeding pump, walker   Plan: Continue POC   Additional Info: Mg, Ph, K- RN managed  Dialysis today 1.5 L removed today  Dialysis reg sched. T/TH/SAT)

## 2024-02-17 NOTE — PLAN OF CARE
Problem: Adult Inpatient Plan of Care  Goal: Plan of Care Review  Outcome: Not Progressing  Flowsheets (Taken 2/17/2024 1435)  Outcome Evaluation: Pt grunting and somnelent this morning, provider notified, provider gave instructions to give Narcan, IV narcan administered. Pt woke up shortly after the dose with a little more energy and ability to answer questions. Chest X-rays done this morning as well, see chart for results.Per NST report pt has had multiple watery stools this morning, PRN immodium given. VBG labs pending, pt went to dialysis, unable to get labs prior to pt departure. TPN running, TF running 10 mL hr. Pt has been bed ridden this encounter due to whats stated above, will need a weight upon returning. Continue POC.  Plan of Care Reviewed With: patient  Overall Patient Progress: declining  Goal: Absence of Hospital-Acquired Illness or Injury  Outcome: Not Progressing  Intervention: Identify and Manage Fall Risk  Recent Flowsheet Documentation  Taken 2/17/2024 0904 by Jo Coker RN  Safety Promotion/Fall Prevention:   activity supervised   patient and family education   room near nurse's station   supervised activity   nonskid shoes/slippers when out of bed   increased rounding and observation  Intervention: Prevent and Manage VTE (Venous Thromboembolism) Risk  Recent Flowsheet Documentation  Taken 2/17/2024 0904 by Jo Coker RN  VTE Prevention/Management: SCDs (sequential compression devices) off  Intervention: Prevent Infection  Recent Flowsheet Documentation  Taken 2/17/2024 0904 by Jo Coker RN  Infection Prevention:   single patient room provided   rest/sleep promoted  Goal: Optimal Comfort and Wellbeing  Outcome: Not Progressing  Goal: Readiness for Transition of Care  Outcome: Not Progressing     Problem: Fall Injury Risk  Goal: Absence of Fall and Fall-Related Injury  Outcome: Not Progressing  Intervention: Identify and Manage Contributors  Recent Flowsheet  Documentation  Taken 2/17/2024 0904 by Jo Coker RN  Medication Review/Management:   provider consulted   medications reviewed  Intervention: Promote Injury-Free Environment  Recent Flowsheet Documentation  Taken 2/17/2024 0904 by Jo Coker RN  Safety Promotion/Fall Prevention:   activity supervised   patient and family education   room near nurse's station   supervised activity   nonskid shoes/slippers when out of bed   increased rounding and observation     Problem: Malnutrition  Goal: Improved Nutritional Intake  Outcome: Not Progressing  Intervention: Optimize Nutrition Delivery  Recent Flowsheet Documentation  Taken 2/17/2024 0904 by Jo Coker RN  Nutrition Support Management:   tube feeding initiated   parenteral nutrition initiated   Declined to order food today    Problem: Pain Acute  Goal: Optimal Pain Control and Function  Outcome: Not Progressing  Intervention: Prevent or Manage Pain  Recent Flowsheet Documentation  Taken 2/17/2024 0904 by Jo Coker RN  Sensory Stimulation Regulation:   care clustered   visual stimulation provided   auditory stimulation provided  Sleep/Rest Enhancement:   therapeutic touch utilized   room darkened   relaxation techniques promoted  Medication Review/Management:   provider consulted   medications reviewed   Goal Outcome Evaluation:      Plan of Care Reviewed With: patient    Overall Patient Progress: declining    Outcome Evaluation: Pt grunting and somnelent this morning, provider notified, provider gave instructions to give Narcan, IV narcan administered. Pt woke up shortly after the dose with a little more energy and ability to answer questions. Chest X-rays done this morning as well, see chart for results.Per NST report pt has had multiple watery stools this morning, PRN immodium given. VBG labs pending, pt went to dialysis, unable to get labs prior to pt departure. TPN running, TF running 10 mL hr. Pt has been bed ridden this encounter due to  whats stated above, will need a weight upon returning. Continue POC.

## 2024-02-17 NOTE — PLAN OF CARE
Goal Outcome Evaluation:      Plan of Care Reviewed With: patient    Overall Patient Progress: no changeOverall Patient Progress: no change    Outcome Evaluation:     Pt continues to have pain at CVC site w/ significant bruising - managed w/ tylenol and oxycodone.     TPN and lipids infusing into CVC. Stop lipids at 9am 2/17. PEG infusing TF at 10mls - tubing switched at 2300.     HD T/T/S - L HD fistula.     Ax1 w/ W/GB.     On 2L of O2 overnight.

## 2024-02-18 ENCOUNTER — APPOINTMENT (OUTPATIENT)
Dept: GENERAL RADIOLOGY | Facility: CLINIC | Age: 64
DRG: 640 | End: 2024-02-18
Attending: INTERNAL MEDICINE
Payer: MEDICARE

## 2024-02-18 ENCOUNTER — APPOINTMENT (OUTPATIENT)
Dept: CARDIOLOGY | Facility: CLINIC | Age: 64
DRG: 640 | End: 2024-02-18
Attending: INTERNAL MEDICINE
Payer: MEDICARE

## 2024-02-18 ENCOUNTER — APPOINTMENT (OUTPATIENT)
Dept: ULTRASOUND IMAGING | Facility: CLINIC | Age: 64
DRG: 640 | End: 2024-02-18
Attending: NURSE PRACTITIONER
Payer: MEDICARE

## 2024-02-18 LAB
% LINING CELLS, BODY FLUID: 17 %
ABO/RH(D): NORMAL
ALBUMIN SERPL BCG-MCNC: 2.6 G/DL (ref 3.5–5.2)
ALBUMIN UR-MCNC: 600 MG/DL
ALP SERPL-CCNC: 51 U/L (ref 40–150)
ALT SERPL W P-5'-P-CCNC: <5 U/L (ref 0–50)
AMORPH CRY #/AREA URNS HPF: ABNORMAL /HPF
ANION GAP SERPL CALCULATED.3IONS-SCNC: 6 MMOL/L (ref 7–15)
ANTIBODY SCREEN: NEGATIVE
APPEARANCE FLD: ABNORMAL
APPEARANCE UR: ABNORMAL
AST SERPL W P-5'-P-CCNC: 15 U/L (ref 0–45)
BASE EXCESS BLDV CALC-SCNC: 2.4 MMOL/L (ref -3–3)
BASE EXCESS BLDV CALC-SCNC: 2.5 MMOL/L (ref -3–3)
BASE EXCESS BLDV CALC-SCNC: 3.1 MMOL/L (ref -3–3)
BASE EXCESS BLDV CALC-SCNC: 6.2 MMOL/L (ref -3–3)
BASE EXCESS BLDV CALC-SCNC: 8.5 MMOL/L (ref -3–3)
BASOPHILS # BLD AUTO: ABNORMAL 10*3/UL
BASOPHILS # BLD MANUAL: 0 10E3/UL (ref 0–0.2)
BASOPHILS NFR BLD AUTO: ABNORMAL %
BASOPHILS NFR BLD MANUAL: 0 %
BILIRUB SERPL-MCNC: 0.2 MG/DL
BILIRUB UR QL STRIP: NEGATIVE
BLD PROD TYP BPU: NORMAL
BLOOD COMPONENT TYPE: NORMAL
BUN SERPL-MCNC: 21.8 MG/DL (ref 8–23)
C PNEUM DNA SPEC QL NAA+PROBE: NOT DETECTED
CA-I BLD-MCNC: 4.7 MG/DL (ref 4.4–5.2)
CALCIUM SERPL-MCNC: 8 MG/DL (ref 8.8–10.2)
CELL COUNT BODY FLUID SOURCE: ABNORMAL
CHLORIDE SERPL-SCNC: 95 MMOL/L (ref 98–107)
CODING SYSTEM: NORMAL
COLOR FLD: YELLOW
COLOR UR AUTO: YELLOW
CREAT SERPL-MCNC: 2.17 MG/DL (ref 0.51–0.95)
CROSSMATCH: NORMAL
CRP SERPL-MCNC: 30.83 MG/L
CRYPTOC AG SPEC QL: NEGATIVE
DEPRECATED HCO3 PLAS-SCNC: 30 MMOL/L (ref 22–29)
EGFRCR SERPLBLD CKD-EPI 2021: 25 ML/MIN/1.73M2
EOSINOPHIL # BLD AUTO: ABNORMAL 10*3/UL
EOSINOPHIL # BLD MANUAL: 0.1 10E3/UL (ref 0–0.7)
EOSINOPHIL NFR BLD AUTO: ABNORMAL %
EOSINOPHIL NFR BLD MANUAL: 4 %
EOSINOPHIL NFR FLD MANUAL: 4 %
ERYTHROCYTE [DISTWIDTH] IN BLOOD BY AUTOMATED COUNT: 14.8 % (ref 10–15)
FLUAV H1 2009 PAND RNA SPEC QL NAA+PROBE: NOT DETECTED
FLUAV H1 RNA SPEC QL NAA+PROBE: NOT DETECTED
FLUAV H3 RNA SPEC QL NAA+PROBE: NOT DETECTED
FLUAV RNA SPEC QL NAA+PROBE: NOT DETECTED
FLUBV RNA SPEC QL NAA+PROBE: NOT DETECTED
GLUCOSE BLDC GLUCOMTR-MCNC: 115 MG/DL (ref 70–99)
GLUCOSE BLDC GLUCOMTR-MCNC: 118 MG/DL (ref 70–99)
GLUCOSE BLDC GLUCOMTR-MCNC: 134 MG/DL (ref 70–99)
GLUCOSE BLDC GLUCOMTR-MCNC: 155 MG/DL (ref 70–99)
GLUCOSE BLDC GLUCOMTR-MCNC: 176 MG/DL (ref 70–99)
GLUCOSE BLDC GLUCOMTR-MCNC: 185 MG/DL (ref 70–99)
GLUCOSE BLDC GLUCOMTR-MCNC: 201 MG/DL (ref 70–99)
GLUCOSE BLDC GLUCOMTR-MCNC: 68 MG/DL (ref 70–99)
GLUCOSE BLDC GLUCOMTR-MCNC: 92 MG/DL (ref 70–99)
GLUCOSE BLDC GLUCOMTR-MCNC: 99 MG/DL (ref 70–99)
GLUCOSE SERPL-MCNC: 106 MG/DL (ref 70–99)
GLUCOSE UR STRIP-MCNC: 150 MG/DL
HADV DNA SPEC QL NAA+PROBE: NOT DETECTED
HCO3 BLDV-SCNC: 28 MMOL/L (ref 21–28)
HCO3 BLDV-SCNC: 28 MMOL/L (ref 21–28)
HCO3 BLDV-SCNC: 29 MMOL/L (ref 21–28)
HCO3 BLDV-SCNC: 31 MMOL/L (ref 21–28)
HCO3 BLDV-SCNC: 32 MMOL/L (ref 21–28)
HCOV PNL SPEC NAA+PROBE: NOT DETECTED
HCT VFR BLD AUTO: 20.3 % (ref 35–47)
HGB BLD-MCNC: 6.3 G/DL (ref 11.7–15.7)
HGB BLD-MCNC: 7.5 G/DL (ref 11.7–15.7)
HGB UR QL STRIP: NEGATIVE
HMPV RNA SPEC QL NAA+PROBE: NOT DETECTED
HOLD SPECIMEN: NORMAL
HPIV1 RNA SPEC QL NAA+PROBE: NOT DETECTED
HPIV2 RNA SPEC QL NAA+PROBE: NOT DETECTED
HPIV3 RNA SPEC QL NAA+PROBE: NOT DETECTED
HPIV4 RNA SPEC QL NAA+PROBE: NOT DETECTED
IMM GRANULOCYTES # BLD: ABNORMAL 10*3/UL
IMM GRANULOCYTES NFR BLD: ABNORMAL %
ISSUE DATE AND TIME: NORMAL
KETONES UR STRIP-MCNC: NEGATIVE MG/DL
KOH PREPARATION: NORMAL
KOH PREPARATION: NORMAL
LACTATE SERPL-SCNC: 1.1 MMOL/L (ref 0.7–2)
LACTATE SERPL-SCNC: 2.5 MMOL/L (ref 0.7–2)
LEUKOCYTE ESTERASE UR QL STRIP: ABNORMAL
LVEF ECHO: NORMAL
LYMPHOCYTES # BLD AUTO: ABNORMAL 10*3/UL
LYMPHOCYTES # BLD MANUAL: 0.6 10E3/UL (ref 0.8–5.3)
LYMPHOCYTES NFR BLD AUTO: ABNORMAL %
LYMPHOCYTES NFR BLD MANUAL: 19 %
LYMPHOCYTES NFR FLD MANUAL: 6 %
M PNEUMO DNA SPEC QL NAA+PROBE: NOT DETECTED
MAGNESIUM SERPL-MCNC: 2 MG/DL (ref 1.7–2.3)
MAGNESIUM SERPL-MCNC: 2 MG/DL (ref 1.7–2.3)
MAGNESIUM SERPL-MCNC: 2.1 MG/DL (ref 1.7–2.3)
MCH RBC QN AUTO: 36.6 PG (ref 26.5–33)
MCHC RBC AUTO-ENTMCNC: 31 G/DL (ref 31.5–36.5)
MCV RBC AUTO: 118 FL (ref 78–100)
METAMYELOCYTES # BLD MANUAL: 0 10E3/UL
METAMYELOCYTES NFR BLD MANUAL: 1 %
MONOCYTES # BLD AUTO: ABNORMAL 10*3/UL
MONOCYTES # BLD MANUAL: 0.3 10E3/UL (ref 0–1.3)
MONOCYTES NFR BLD AUTO: ABNORMAL %
MONOCYTES NFR BLD MANUAL: 10 %
MONOS+MACROS NFR FLD MANUAL: 69 %
MRSA DNA SPEC QL NAA+PROBE: NEGATIVE
MUCOUS THREADS #/AREA URNS LPF: PRESENT /LPF
NEUTROPHILS # BLD AUTO: ABNORMAL 10*3/UL
NEUTROPHILS # BLD MANUAL: 2 10E3/UL (ref 1.6–8.3)
NEUTROPHILS NFR BLD AUTO: ABNORMAL %
NEUTROPHILS NFR BLD MANUAL: 66 %
NEUTS BAND NFR FLD MANUAL: 4 %
NITRATE UR QL: NEGATIVE
NRBC # BLD AUTO: 0 10E3/UL
NRBC # BLD AUTO: 0 10E3/UL
NRBC BLD AUTO-RTO: 0 /100
NRBC BLD MANUAL-RTO: 1 %
O2/TOTAL GAS SETTING VFR VENT: 30 %
OXYHGB MFR BLDV: 74 % (ref 70–75)
OXYHGB MFR BLDV: 77 % (ref 70–75)
OXYHGB MFR BLDV: 79 % (ref 70–75)
OXYHGB MFR BLDV: 81 % (ref 70–75)
OXYHGB MFR BLDV: 87 % (ref 70–75)
PCO2 BLDV: 34 MM HG (ref 40–50)
PCO2 BLDV: 45 MM HG (ref 40–50)
PCO2 BLDV: 48 MM HG (ref 40–50)
PCO2 BLDV: 52 MM HG (ref 40–50)
PCO2 BLDV: 59 MM HG (ref 40–50)
PH BLDV: 7.31 [PH] (ref 7.32–7.43)
PH BLDV: 7.38 [PH] (ref 7.32–7.43)
PH BLDV: 7.39 [PH] (ref 7.32–7.43)
PH BLDV: 7.4 [PH] (ref 7.32–7.43)
PH BLDV: 7.57 [PH] (ref 7.32–7.43)
PH UR STRIP: 7.5 [PH] (ref 5–7)
PHOSPHATE SERPL-MCNC: 1.1 MG/DL (ref 2.5–4.5)
PHOSPHATE SERPL-MCNC: 1.8 MG/DL (ref 2.5–4.5)
PHOSPHATE SERPL-MCNC: 2.2 MG/DL (ref 2.5–4.5)
PLAT MORPH BLD: ABNORMAL
PLATELET # BLD AUTO: 138 10E3/UL (ref 150–450)
PO2 BLDV: 29 MM HG (ref 25–47)
PO2 BLDV: 37 MM HG (ref 25–47)
PO2 BLDV: 38 MM HG (ref 25–47)
PO2 BLDV: 46 MM HG (ref 25–47)
PO2 BLDV: 50 MM HG (ref 25–47)
POLYCHROMASIA BLD QL SMEAR: SLIGHT
POTASSIUM BLD-SCNC: 2.8 MMOL/L (ref 3.4–5.3)
POTASSIUM SERPL-SCNC: 3.4 MMOL/L (ref 3.4–5.3)
POTASSIUM SERPL-SCNC: 3.8 MMOL/L (ref 3.4–5.3)
POTASSIUM SERPL-SCNC: 4 MMOL/L (ref 3.4–5.3)
PROT SERPL-MCNC: 4.5 G/DL (ref 6.4–8.3)
RBC # BLD AUTO: 1.72 10E6/UL (ref 3.8–5.2)
RBC MORPH BLD: ABNORMAL
RBC URINE: 12 /HPF
RSV RNA SPEC QL NAA+PROBE: NOT DETECTED
RSV RNA SPEC QL NAA+PROBE: NOT DETECTED
RV+EV RNA SPEC QL NAA+PROBE: NOT DETECTED
SA TARGET DNA: NEGATIVE
SAO2 % BLDV: 76 % (ref 70–75)
SAO2 % BLDV: 78.6 % (ref 70–75)
SAO2 % BLDV: 81.9 % (ref 70–75)
SAO2 % BLDV: 83.3 % (ref 70–75)
SAO2 % BLDV: 89 % (ref 70–75)
SODIUM SERPL-SCNC: 131 MMOL/L (ref 135–145)
SP GR UR STRIP: 1.02 (ref 1–1.03)
SPECIMEN EXPIRATION DATE: NORMAL
SQUAMOUS EPITHELIAL: <1 /HPF
STOMATOCYTES BLD QL SMEAR: ABNORMAL
TACROLIMUS BLD-MCNC: 4.8 UG/L (ref 5–15)
TME LAST DOSE: ABNORMAL H
TME LAST DOSE: ABNORMAL H
TRANSITIONAL EPI: 1 /HPF
UNIT ABO/RH: NORMAL
UNIT NUMBER: NORMAL
UNIT STATUS: NORMAL
UNIT TYPE ISBT: 5100
UROBILINOGEN UR STRIP-MCNC: NORMAL MG/DL
WBC # BLD AUTO: 3 10E3/UL (ref 4–11)
WBC # FLD AUTO: 105 /UL
WBC CLUMPS #/AREA URNS HPF: PRESENT /HPF
WBC URINE: 66 /HPF

## 2024-02-18 PROCEDURE — 999N000289 HC STATISTIC BRONCHOSCOPY ASST

## 2024-02-18 PROCEDURE — 87106 FUNGI IDENTIFICATION YEAST: CPT

## 2024-02-18 PROCEDURE — 250N000013 HC RX MED GY IP 250 OP 250 PS 637: Performed by: INTERNAL MEDICINE

## 2024-02-18 PROCEDURE — 999N000185 HC STATISTIC TRANSPORT TIME EA 15 MIN

## 2024-02-18 PROCEDURE — 250N000013 HC RX MED GY IP 250 OP 250 PS 637: Performed by: NURSE PRACTITIONER

## 2024-02-18 PROCEDURE — 87040 BLOOD CULTURE FOR BACTERIA: CPT | Performed by: INTERNAL MEDICINE

## 2024-02-18 PROCEDURE — 83605 ASSAY OF LACTIC ACID: CPT | Performed by: NURSE PRACTITIONER

## 2024-02-18 PROCEDURE — 250N000012 HC RX MED GY IP 250 OP 636 PS 637: Performed by: INTERNAL MEDICINE

## 2024-02-18 PROCEDURE — 84100 ASSAY OF PHOSPHORUS: CPT | Performed by: NURSE PRACTITIONER

## 2024-02-18 PROCEDURE — 250N000009 HC RX 250

## 2024-02-18 PROCEDURE — 82805 BLOOD GASES W/O2 SATURATION: CPT

## 2024-02-18 PROCEDURE — 87305 ASPERGILLUS AG IA: CPT | Performed by: INTERNAL MEDICINE

## 2024-02-18 PROCEDURE — 87210 SMEAR WET MOUNT SALINE/INK: CPT

## 2024-02-18 PROCEDURE — 71045 X-RAY EXAM CHEST 1 VIEW: CPT

## 2024-02-18 PROCEDURE — 87081 CULTURE SCREEN ONLY: CPT

## 2024-02-18 PROCEDURE — 250N000011 HC RX IP 250 OP 636: Performed by: NURSE PRACTITIONER

## 2024-02-18 PROCEDURE — 87102 FUNGUS ISOLATION CULTURE: CPT

## 2024-02-18 PROCEDURE — 87899 AGENT NOS ASSAY W/OPTIC: CPT | Performed by: INTERNAL MEDICINE

## 2024-02-18 PROCEDURE — 89050 BODY FLUID CELL COUNT: CPT

## 2024-02-18 PROCEDURE — 87449 NOS EACH ORGANISM AG IA: CPT | Performed by: INTERNAL MEDICINE

## 2024-02-18 PROCEDURE — 87116 MYCOBACTERIA CULTURE: CPT

## 2024-02-18 PROCEDURE — 250N000009 HC RX 250: Performed by: EMERGENCY MEDICINE

## 2024-02-18 PROCEDURE — 93306 TTE W/DOPPLER COMPLETE: CPT

## 2024-02-18 PROCEDURE — 94003 VENT MGMT INPAT SUBQ DAY: CPT

## 2024-02-18 PROCEDURE — 85027 COMPLETE CBC AUTOMATED: CPT

## 2024-02-18 PROCEDURE — 88184 FLOWCYTOMETRY/ TC 1 MARKER: CPT | Performed by: PATHOLOGY

## 2024-02-18 PROCEDURE — 84132 ASSAY OF SERUM POTASSIUM: CPT | Performed by: NURSE PRACTITIONER

## 2024-02-18 PROCEDURE — 250N000011 HC RX IP 250 OP 636: Performed by: INTERNAL MEDICINE

## 2024-02-18 PROCEDURE — 99233 SBSQ HOSP IP/OBS HIGH 50: CPT | Performed by: INTERNAL MEDICINE

## 2024-02-18 PROCEDURE — 87799 DETECT AGENT NOS DNA QUANT: CPT | Performed by: INTERNAL MEDICINE

## 2024-02-18 PROCEDURE — 93971 EXTREMITY STUDY: CPT | Mod: 26 | Performed by: RADIOLOGY

## 2024-02-18 PROCEDURE — 36415 COLL VENOUS BLD VENIPUNCTURE: CPT | Performed by: INTERNAL MEDICINE

## 2024-02-18 PROCEDURE — 31624 DX BRONCHOSCOPE/LAVAGE: CPT | Mod: GC | Performed by: EMERGENCY MEDICINE

## 2024-02-18 PROCEDURE — 81001 URINALYSIS AUTO W/SCOPE: CPT | Performed by: NURSE PRACTITIONER

## 2024-02-18 PROCEDURE — 83735 ASSAY OF MAGNESIUM: CPT | Performed by: NURSE PRACTITIONER

## 2024-02-18 PROCEDURE — 93971 EXTREMITY STUDY: CPT | Mod: LT

## 2024-02-18 PROCEDURE — 87633 RESP VIRUS 12-25 TARGETS: CPT

## 2024-02-18 PROCEDURE — 86900 BLOOD TYPING SEROLOGIC ABO: CPT

## 2024-02-18 PROCEDURE — 85018 HEMOGLOBIN: CPT

## 2024-02-18 PROCEDURE — 258N000003 HC RX IP 258 OP 636: Performed by: NURSE PRACTITIONER

## 2024-02-18 PROCEDURE — 87086 URINE CULTURE/COLONY COUNT: CPT | Performed by: NURSE PRACTITIONER

## 2024-02-18 PROCEDURE — 87205 SMEAR GRAM STAIN: CPT

## 2024-02-18 PROCEDURE — 93010 ELECTROCARDIOGRAM REPORT: CPT | Performed by: INTERNAL MEDICINE

## 2024-02-18 PROCEDURE — 71045 X-RAY EXAM CHEST 1 VIEW: CPT | Mod: 26 | Performed by: RADIOLOGY

## 2024-02-18 PROCEDURE — 88185 FLOWCYTOMETRY/TC ADD-ON: CPT | Performed by: INTERNAL MEDICINE

## 2024-02-18 PROCEDURE — 250N000011 HC RX IP 250 OP 636: Mod: JZ

## 2024-02-18 PROCEDURE — 83735 ASSAY OF MAGNESIUM: CPT | Performed by: INTERNAL MEDICINE

## 2024-02-18 PROCEDURE — 250N000009 HC RX 250: Performed by: NURSE PRACTITIONER

## 2024-02-18 PROCEDURE — 31622 DX BRONCHOSCOPE/WASH: CPT

## 2024-02-18 PROCEDURE — P9016 RBC LEUKOCYTES REDUCED: HCPCS

## 2024-02-18 PROCEDURE — 88189 FLOWCYTOMETRY/READ 16 & >: CPT | Mod: GC | Performed by: PATHOLOGY

## 2024-02-18 PROCEDURE — 84100 ASSAY OF PHOSPHORUS: CPT | Performed by: INTERNAL MEDICINE

## 2024-02-18 PROCEDURE — 80053 COMPREHEN METABOLIC PANEL: CPT | Performed by: INTERNAL MEDICINE

## 2024-02-18 PROCEDURE — 93005 ELECTROCARDIOGRAM TRACING: CPT

## 2024-02-18 PROCEDURE — 999N000055 HC STATISTIC END TITIAL CO2 MONITORING

## 2024-02-18 PROCEDURE — 999N000157 HC STATISTIC RCP TIME EA 10 MIN

## 2024-02-18 PROCEDURE — 200N000002 HC R&B ICU UMMC

## 2024-02-18 PROCEDURE — 0B9D8ZX DRAINAGE OF RIGHT MIDDLE LUNG LOBE, VIA NATURAL OR ARTIFICIAL OPENING ENDOSCOPIC, DIAGNOSTIC: ICD-10-PCS | Performed by: EMERGENCY MEDICINE

## 2024-02-18 PROCEDURE — 86922 COMPATIBILITY TEST ANTIGLOB: CPT

## 2024-02-18 PROCEDURE — 80197 ASSAY OF TACROLIMUS: CPT | Performed by: INTERNAL MEDICINE

## 2024-02-18 PROCEDURE — 82805 BLOOD GASES W/O2 SATURATION: CPT | Performed by: NURSE PRACTITIONER

## 2024-02-18 PROCEDURE — 93306 TTE W/DOPPLER COMPLETE: CPT | Mod: 26 | Performed by: STUDENT IN AN ORGANIZED HEALTH CARE EDUCATION/TRAINING PROGRAM

## 2024-02-18 PROCEDURE — 99233 SBSQ HOSP IP/OBS HIGH 50: CPT | Mod: 25 | Performed by: INTERNAL MEDICINE

## 2024-02-18 PROCEDURE — 86140 C-REACTIVE PROTEIN: CPT | Performed by: NURSE PRACTITIONER

## 2024-02-18 PROCEDURE — 258N000003 HC RX IP 258 OP 636

## 2024-02-18 PROCEDURE — 87206 SMEAR FLUORESCENT/ACID STAI: CPT

## 2024-02-18 PROCEDURE — 85007 BL SMEAR W/DIFF WBC COUNT: CPT

## 2024-02-18 PROCEDURE — 82330 ASSAY OF CALCIUM: CPT | Performed by: NURSE PRACTITIONER

## 2024-02-18 PROCEDURE — 87186 SC STD MICRODIL/AGAR DIL: CPT | Performed by: NURSE PRACTITIONER

## 2024-02-18 PROCEDURE — 258N000001 HC RX 258

## 2024-02-18 PROCEDURE — 99291 CRITICAL CARE FIRST HOUR: CPT | Mod: 25 | Performed by: STUDENT IN AN ORGANIZED HEALTH CARE EDUCATION/TRAINING PROGRAM

## 2024-02-18 RX ORDER — POTASSIUM CHLORIDE 1.5 G/1.58G
20 POWDER, FOR SOLUTION ORAL ONCE
Status: COMPLETED | OUTPATIENT
Start: 2024-02-18 | End: 2024-02-19

## 2024-02-18 RX ORDER — POTASSIUM CHLORIDE 1.5 G/1.58G
40 POWDER, FOR SOLUTION ORAL ONCE
Status: COMPLETED | OUTPATIENT
Start: 2024-02-18 | End: 2024-02-18

## 2024-02-18 RX ADMIN — CALCIUM CARBONATE 600 MG (1,500 MG)-VITAMIN D3 400 UNIT TABLET 1 TABLET: at 08:30

## 2024-02-18 RX ADMIN — CYANOCOBALAMIN TAB 500 MCG 500 MCG: 500 TAB at 09:15

## 2024-02-18 RX ADMIN — TACROLIMUS 4.5 MG: 5 CAPSULE ORAL at 21:58

## 2024-02-18 RX ADMIN — PREDNISONE 2.5 MG: 2.5 TABLET ORAL at 20:42

## 2024-02-18 RX ADMIN — Medication 40 MG: at 20:42

## 2024-02-18 RX ADMIN — CALCIUM CARBONATE 600 MG (1,500 MG)-VITAMIN D3 400 UNIT TABLET 1 TABLET: at 14:10

## 2024-02-18 RX ADMIN — TACROLIMUS 4 MG: 5 CAPSULE ORAL at 09:15

## 2024-02-18 RX ADMIN — HEPARIN SODIUM 5000 UNITS: 5000 INJECTION, SOLUTION INTRAVENOUS; SUBCUTANEOUS at 23:03

## 2024-02-18 RX ADMIN — PIPERACILLIN AND TAZOBACTAM 2.25 G: 2; .25 INJECTION, POWDER, FOR SOLUTION INTRAVENOUS at 08:23

## 2024-02-18 RX ADMIN — ACETAMINOPHEN 1000 MG: 325 SOLUTION ORAL at 20:42

## 2024-02-18 RX ADMIN — Medication 40 MG: at 08:30

## 2024-02-18 RX ADMIN — MAGNESIUM SULFATE HEPTAHYDRATE: 500 INJECTION, SOLUTION INTRAMUSCULAR; INTRAVENOUS at 23:02

## 2024-02-18 RX ADMIN — ACETAMINOPHEN 1000 MG: 325 SOLUTION ORAL at 08:29

## 2024-02-18 RX ADMIN — CALCIUM CARBONATE 600 MG (1,500 MG)-VITAMIN D3 400 UNIT TABLET 1 TABLET: at 20:42

## 2024-02-18 RX ADMIN — PIPERACILLIN AND TAZOBACTAM 2.25 G: 2; .25 INJECTION, POWDER, FOR SOLUTION INTRAVENOUS at 20:43

## 2024-02-18 RX ADMIN — PROPOFOL 20 MCG/KG/MIN: 10 INJECTION, EMULSION INTRAVENOUS at 21:51

## 2024-02-18 RX ADMIN — Medication 1 TABLET: at 08:30

## 2024-02-18 RX ADMIN — PREDNISONE 5 MG: 5 TABLET ORAL at 08:30

## 2024-02-18 RX ADMIN — HEPARIN SODIUM 5000 UNITS: 5000 INJECTION, SOLUTION INTRAVENOUS; SUBCUTANEOUS at 10:22

## 2024-02-18 RX ADMIN — ACETAMINOPHEN 1000 MG: 325 SOLUTION ORAL at 14:10

## 2024-02-18 RX ADMIN — DEXTROSE MONOHYDRATE 25 ML: 25 INJECTION, SOLUTION INTRAVENOUS at 00:28

## 2024-02-18 RX ADMIN — CHLORHEXIDINE GLUCONATE 15 ML: 1.2 SOLUTION ORAL at 08:29

## 2024-02-18 RX ADMIN — Medication 50 MCG: at 09:05

## 2024-02-18 RX ADMIN — PIPERACILLIN AND TAZOBACTAM 2.25 G: 2; .25 INJECTION, POWDER, FOR SOLUTION INTRAVENOUS at 03:13

## 2024-02-18 RX ADMIN — POTASSIUM PHOSPHATE, MONOBASIC POTASSIUM PHOSPHATE, DIBASIC 9 MMOL: 224; 236 INJECTION, SOLUTION, CONCENTRATE INTRAVENOUS at 08:23

## 2024-02-18 RX ADMIN — POTASSIUM PHOSPHATE, MONOBASIC POTASSIUM PHOSPHATE, DIBASIC 9 MMOL: 224; 236 INJECTION, SOLUTION, CONCENTRATE INTRAVENOUS at 16:11

## 2024-02-18 RX ADMIN — CHLORHEXIDINE GLUCONATE 15 ML: 1.2 SOLUTION ORAL at 20:42

## 2024-02-18 RX ADMIN — PROPOFOL 30 MCG/KG/MIN: 10 INJECTION, EMULSION INTRAVENOUS at 08:18

## 2024-02-18 RX ADMIN — MICAFUNGIN SODIUM 100 MG: 50 INJECTION, POWDER, LYOPHILIZED, FOR SOLUTION INTRAVENOUS at 09:41

## 2024-02-18 RX ADMIN — PIPERACILLIN AND TAZOBACTAM 2.25 G: 2; .25 INJECTION, POWDER, FOR SOLUTION INTRAVENOUS at 14:39

## 2024-02-18 RX ADMIN — POTASSIUM CHLORIDE 40 MEQ: 1.5 FOR SOLUTION ORAL at 03:13

## 2024-02-18 ASSESSMENT — ACTIVITIES OF DAILY LIVING (ADL)
ADLS_ACUITY_SCORE: 36
ADLS_ACUITY_SCORE: 42
ADLS_ACUITY_SCORE: 36
ADLS_ACUITY_SCORE: 41
ADLS_ACUITY_SCORE: 36
ADLS_ACUITY_SCORE: 38
ADLS_ACUITY_SCORE: 42
ADLS_ACUITY_SCORE: 38
ADLS_ACUITY_SCORE: 38
ADLS_ACUITY_SCORE: 41

## 2024-02-18 NOTE — PROGRESS NOTES
Changes this shift: pt intubated, vent setting V400,R 24, PEEP 5 FiO2 30%. Sedated, AO to person, vitals stable. CT of head and chest done, 2 PVIs placed and a port. Had 2 loose BMs, pt oliguric, bladder scanned and straight cathed. On fent and prop drips for sedation. Hypoglycemic one time this shift, restarted back on tube feeding and d50 administered, has TPN through the port and tube feedings through the peg tube.Bruising to face and neck post fall, bruising and skin tears from IV insertion to R arm, L arm on limb alert. K and Mg replaced.

## 2024-02-18 NOTE — PHARMACY-VANCOMYCIN DOSING SERVICE
Pharmacy Vancomycin Initial Note  Date of Service 2024  Patient's  1960  63 year old, female    Indication: Healthcare-Associated Pneumonia    Current estimated CrCl = Estimated Creatinine Clearance: 23.5 mL/min (A) (based on SCr of 1.81 mg/dL (H)).    Creatinine for last 3 days  2/15/2024:  6:20 AM Creatinine 3.95 mg/dL  2024:  7:03 AM Creatinine 3.14 mg/dL  2024: 12:43 PM Creatinine 2.86 mg/dL;  6:30 PM Creatinine 1.81 mg/dL    Recent Vancomycin Level(s) for last 3 days  No results found for requested labs within last 3 days.      Vancomycin IV Administrations (past 72 hours)        No vancomycin orders with administrations in past 72 hours.                    Nephrotoxins and other renal medications (From now, onward)      Start     Dose/Rate Route Frequency Ordered Stop    24 2100  piperacillin-tazobactam (ZOSYN) 2.25 g vial to attach to  ml bag        Note to Pharmacy: Please adjust for HD, pseudomal dosing. Thank you!    2.25 g  over 30 Minutes Intravenous EVERY 6 HOURS 24  vancomycin (VANCOCIN) 1,000 mg in 200 mL dextrose intermittent infusion         1,000 mg  200 mL/hr over 1 Hours Intravenous ONCE 24  vancomycin place blake - receiving intermittent dosing         1 each Intravenous SEE ADMIN INSTRUCTIONS 24  norepinephrine (LEVOPHED) 4 mg in  mL PERIPHERAL infusion         0.03-0.125 mcg/kg/min × 45.7 kg (Dosing Weight)  5.1-21.4 mL/hr  Intravenous CONTINUOUS 24 0800  tacrolimus (GENERIC) suspension 4 mg        Note to Pharmacy: PTA Sig:Take 4 mLs (4 mg) by mouth every morning AND 4 mLs (4 mg) every evening.     See [facility] for full Linked Orders Report.    4 mg Per J Tube EVERY MORNING 24 1906      24 1930  tacrolimus (GENERIC) suspension 4.5 mg        Note to Pharmacy: PTA Sig:Take 4 mLs (4 mg) by mouth every morning AND 4 mLs  (4 mg) every evening.     [facility] Orders Report.    4.5 mg Per J Tube EVERY EVENING 02/12/24 1906              Contrast Orders - past 72 hours (72h ago, onward)      None                  Plan:  Start vancomycin 1000 mg (20mg/kg) IV once.   Vancomycin monitoring method: Renal Replacement Therapy (HD T/Th/Sat)  Vancomycin therapeutic monitoring goal: 15-20 mg/L  Pharmacy will check vancomycin levels as appropriate in 1-3 Days.    Serum creatinine levels will be ordered daily for the first week of therapy and at least twice weekly for subsequent weeks.      Ariel Licea, [facility]

## 2024-02-18 NOTE — PROGRESS NOTES
Patient arrived from [address] [place] [address] at [address] following RRT for critical labs PH 7.15, C02 98 on Bipap10/5, rate 18, Fi02 45%.Patient transferred to ICU monitoring.    2 RN Skin check completed by [facility] COATES and Lonnie    Orders were obtained and completed as described in flowsheets.      Mer[facility] Jean Baptiste RN

## 2024-02-18 NOTE — PROGRESS NOTES
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 Admitted 2/10/24 to [facility] for FTT and small bowel dysmotility for port-a-cath placement and TPN initiation. Transferred to ICU evening of 2/17/24 for acute on chronic hypoxic and hypercarbic respiratory failure with severe hypercarbia and AMS requiring intubation, ddx includes pulmonary edema vs infection, possible mixed picture.     CHANGES and MAJOR THINGS TODAY:   - Added micafungin  - Bronch with BAL   - 1 unit RBC with hgb recheck   - Plan to transfer to [facility]     PLAN:    Neuro:  # AMS 2/2 acute on chronic hypercarbia  # Sedation for mechanical ventilation  Noted to be more lethargic AM of 2/17/24. Given narcan with favorable response after receiving oxycodone 5mg around 0500. Continued lethargy throughout the day. CT head negative for cute anomalies. Work up indicating encephalopathy likely 2/2 hypercarbia.   Sedation: Propofol gtt and bolus   Analgesia: Fentanyl gtt and bolus   No PTA psych meds      Pulmonary:  # Acute on chronic hypoxic and hypercarbic respiratory failure, pulm edema vs infection  # R hemidiaphragm palsy  # Hx of bilateral lung transplant 6/8/22 for COPD  # Recurrent PNAs   # Positive DSA, no AMR treatment    # Suspected CARLEE  # PTA nocturnal O2, 2L   Underwent bilateral lung transplant 6/28/22 for COPD. Has recently been struggling with malnutrition 2/2 tube feeding intolerance and recurrent hospitalizations for FTT and falls. RRT called on 2/17 for worsening acidosis. Patient was started on bipap, failed d/t apnea/continued acidosis and was intubated. Chest CT completed 2/18 showing patchy multifocal consolidative opacities with adjacent groundglass  attenuation throughout the lungs, new/increased since 2/7/2024 CT.   Pulmonology following, appreciate recs  Bronch completed today with BAL and specimens sent  Empiric antimicrobials- see ID   Volume removal with daily HD per nephr    Daily VBG with recheck this afternoon  Tacro level and Prospera collected this AM   Immunosuppression/ppx:  Pred 2.5 AM/5 PM  Tacro (goal 7-9)  Dapsone MWF    Vent Mode: CMV/AC  FiO2 (%): 30%  Resp Rate (Set): 18 breaths/min  Tidal volume (set, mL): 300 mL  PEEP (cm H20): 5 cmH2O     Cardiovascular:  # Elevated BNP with pulm edema 2/2 volume overload  # Severe bilateral atrial enlargement on echo   # Hx of HFpEF  # HTN  Echo today showing collapsible IVC indicating she is intravascularly dry; however, with her poor nutrition and hypoalbuminemia, intravascular fluid leaking from vessels leading to peripheral and pulmonary edema   TTE completed   Tele and VS per ICU routine  Goal MAP > 65 mmHg   Holding PTA metoprolol   Consider early stress dose steroids if consistent pressor requirement given chronic prednisone use     ECHO 2/18/24:   - Global and regional left ventricular function is normal with an EF of 55-60%.  - Global right ventricular function is normal. The right ventricle is normal size.  - No significant valvular abnormalities present.  - IVC diameter <2.1 cm collapsing >50% with sniff suggests a normal RA pressure  of 3 mmHg.  - Severe left atrial enlargement is present. Severe right atrial enlargement is present.       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 Over the last year has lost 40 lbs (jan 2023 136 lbs,  95 lbs on admission), unable to tolerate any combination of TF, most recently elemental formula. Normal fecal elastase last May. Following with Dr. Navarro who feels her main two issues are vagal injury induced gastroparesis and probably small bowel hypomotility. GI recommending trickle feeds and CT to assess for structural issues.   GI consulted, recs CT enterography, unable to obtain d/t likely inability to tolerate 1.5L of enteral contrast over 1 hr, could consider now that pt has a protected airway  RD following appreciate recs  TF @ 10 ml/hr via J-port  TPN + lipids, limit volume to 1.5L   Consider resuming SIBO treatment with rifaximin if recurrent GI symptoms   Monitor for refeeding syndrome, see renal   PRN loperamide, last BM 2/17  Continue PTA PPI BID        Renal/Fluids/Electrolytes:  # ESRD on HD (T/Th/Sat)   # Hypervolemic hyponatremia  # Hypomagnesemia  # Hypokalemia   # Hypophosphatemia  # C/F refeeding syndrome   # Lower extremity swelling 2/2 hypoalbuminemia   Neph following, appreciate recs   Daily iHD for volume removal  Hold sevelamer d/t hypophosphatemia, likely in setting of refeeding syndrome  Daily bladder scans  Strict I&O and daily weights   Daily CMP with q8rh mag/phos/potassium   Replace electrolytes as needed   Lymphedema consult, elevate legs while sedated      Endocrine:  # Hypothyroidism, possibly 2/2 critical illness   # Iatrogenic hypoglycemia  TSH 6.50, T4 0.64, which is a new finding for her. Somewhat difficult to interpret in the setting of critical illness.   Will recheck TSH when patient not critically ill and add supplementation if needed  Hypoglycemia protocol, continue TPN and TF  BG goal 100-180      ID:  # Concern for PNA   # Hx EBV viremia   # Hypogammaglobulinemia  # Chronic immunosuppression  CTM fever curve and WBC trend  Daily CBC  Cultures:  - 2/17 BC - NGTD  - 2/17 COVID - NEG  - 2/17 Sputum, NGTD  - 2/17 RVP - in process  - 2/17 MRSA - NEG  - 2/18 UA- no nitrates,  trace LE- culture in process  - 2/18 Cryptococcal antigen - NEG  - 2/18 Aspergillus Galact- in process  - 2/18 Urine strep/legionella- needs to be collected   - 2/18 1, 3 Beta D glucan- in process  - 2/18 EBV DNA PCR- in process   - 2/18 BAL specemins in process     Antimicrobials:  - Vancomycin (2/17 - current)  - Zosyn (2/17 - current)  - Micafungin (2/18- current)   - Dapsone MWF, PJP ppx     Immunosuppression:  - Tacrolimus  - Prednisone      Hematology:    # Acute on chronic anemia in the setting of ESRD, exacerbated by recent fall with extensive bruising  # Thrombocytopenia  # Unilateral LUE swelling  Daily CBC with diff  Venofir and Epogen per nephrology  Ppx heparin  LUE US completed- negative for DVT   Hgb 6.3 this AM- type and screen, 1 unit RBC with recheck hgb following   Transfuse hgb <7      Musculoskeletal:  # Failure to thrive with significant deconditioning  # R hip fracture 12/23, s/p ORIF  # Recurrent falls, recent fall with facial trauma  PT/OT consult     Skin:  # Facial and neck bruising  Diligent skin cares to prevent breakdown    General Cares/Prophylaxis:    DVT Prophylaxis: Heparin SQ  GI Prophylaxis: PPI  Restraints: NA  Family Communication: Daughter Charity updated over the phone   Code Status: Full    Lines/tubes/drains:  - R tunneled internal jugular  - PIV x2  - LUE AV fistula  - G/J tube  - ETT    Disposition:  - [facility] ICU     Patient seen and findings/plan discussed with medical ICU staff, Dr. Weinstein.    RUFUS Howell CNP    Critical Care time: 45 min   ====================================  INTERVAL HISTORY:   Patient intubated overnight for worsening acidosis and apnea on bipap. Remains hemodynamically stable. Bronch completed today with BAL and specimens sent. Plan for patient to transfer over to [place] [facility] when bed is available.     OBJECTIVE:   1. VITAL SIGNS:   Temp:  [97.3  F (36.3  C)-97.9  F (36.6  C)] 97.8  F (36.6  C)  Pulse:  [66-91] 76  Resp:  [12-31] 18  BP:  ()/(52-78) 128/75  FiO2 (%):  [30 %-45 %] 30 %  SpO2:  [93 %-100 %] 100 %    2. INTAKE/ OUTPUT:   I/O last 3 completed shifts:  In: 168.08 [I.V.:98.08; NG/GT:60]  Out: 2010 [Urine:10; Other:2000]    3. PHYSICAL EXAMINATION:  General: Acute on chronically ill appearing cachectic female  HEENT: Normocephalic, Bruising to R face and neck, small hematoma to R eyelid, oral mucosa moist, neck supple   Neuro: Somnolent prior to intubation, moving all extremities with equal strength, pupils 3mm PERRL.  Pulm/Resp: Fine crackles bilaterally, compliant with mechanical ventilation, scant amount of thick yellow/green secretions suctioned from ETT   CV: RRR, no RMG, extremities warm, 2+ pitting edema to bilateral lower extremities   Abdomen: Soft, flat, non-distended, non-tender, G-J tube site clean without surrounding redness or drainage  : Anuric, LUE AV fistula with + bruit & thrill   Incisions/Skin: LUE with unilateral 2+ edema, bilateral lower extremity 2+ pitting edema, scattered bruising     4. LABS:   Arterial Blood Gases   Recent Labs   Lab 02/17/24  2120   PH 7.25*   PCO2 78*   PO2 34*   HCO3 34*     Complete Blood Count   Recent Labs   Lab 02/18/24  0842 02/17/24  1830 02/16/24  0703 02/15/24  0620 02/14/24  0807 02/13/24  0700   WBC 3.0* 4.2  --   --  4.5 4.5   HGB 6.3* 7.8*  --  8.6* 8.2* 9.1*   * 145* 183  --  175 191     Basic Metabolic Panel  Recent Labs   Lab 02/18/24  0828 02/18/24  0513 02/18/24  0406 02/18/24  0202 02/18/24  0201 02/17/24  1953 02/17/24  1830 02/17/24  1806 02/17/24  1243 02/16/24  1820 02/16/24  0703   NA  --  131*  --   --   --   --  130*  --  130*  --  130*   POTASSIUM  --  3.8  --   --  2.8*  --  3.6  --  3.9  --  4.0   CHLORIDE  --  95*  --   --   --   --  94*  --  94*  --  93*   CO2  --  30*  --   --   --   --  31*  --  25  --  27   BUN  --  21.8  --   --   --   --  17.4  --  31.7*  --  32.2*   CR  --  2.17*  --   --   --   --  1.81*  --  2.86*  --  3.14*   * 106*  92 99  --    < > 131*   < > 233*   < > 181*    < > = values in this interval not displayed.     Liver Function Tests  Recent Labs   Lab 02/18/24  0513 02/17/24  1830 02/17/24  1243 02/16/24  0703 02/14/24  0807 02/13/24  1658 02/13/24  0700   AST 15 13 15 17   < >  --  31   ALT <5 <5 <5 <5   < >  --  8   ALKPHOS 51 62 79 66   < >  --  56   BILITOTAL 0.2 0.2 0.2 <0.2   < >  --  0.2   ALBUMIN 2.6* 3.1* 3.1* 3.1*   < >  --  3.1*   INR  --  0.90  --   --   --  0.92 0.97    < > = values in this interval not displayed.     Coagulation Profile  Recent Labs   Lab 02/17/24  1830 02/13/24  1658 02/13/24  0700   INR 0.90 0.92 0.97       5. RADIOLOGY:   Recent Results (from the past 24 hour(s))   XR Chest Port 1 View    Narrative    EXAM: XR CHEST PORT 1 VIEW  2/17/2024 10:02 AM     HISTORY:  decreased mental status, labored breathing.       COMPARISON:  2/16/2024    TECHNIQUE: Portable AP semiupright view of the chest    FINDINGS:   Right IJ CVC tip terminates over the cavoatrial junction. Postsurgical  changes of bilateral lung transplantation with intact clamshell  sternotomy wires..    Stable enlarged cardiac silhouette. Unchanged blunting of the  costophrenic angles. Stable small left pleural effusion. Trace right  effusion. No pneumothorax. Increased mixed opacities in the lung  fields bilaterally, left greater than right.      Impression    IMPRESSION:  1. Increased mixed opacities in the lung fields bilaterally, left  greater than right, which may represent edema, atelectasis, or  infection.  2. Stable small left and trace right pleural effusion.    I have personally reviewed the examination and initial interpretation  and I agree with the findings.    ISIDRO NOVAK MD         SYSTEM ID:  [id number]   XR Chest Port 1 View    Narrative    EXAM: XR CHEST PORT 1 VIEW 2/17/2024 6:18 PM     HISTORY: respiratory failure       COMPARISON: Radiographs 2/17/2024, CT 2/7/2024     FINDINGS: Right IJ central venous catheter tip  projects over the  superior cavoatrial junction. Stable cardiac silhouette. Clamshell  sternotomy wires. Postsurgical changes of bilateral lung transplant.  Slightly improved streaky perihilar and bibasilar opacities. Unchanged  small bilateral pleural effusions. No pneumothorax.       Impression    IMPRESSION:  Slightly improved pulmonary edema and unchanged bilateral pleural  effusions compared to the 9:34 AM radiograph. Otherwise, no  significant change.    I have personally reviewed the examination and initial interpretation  and I agree with the findings.    JOHANN MORAES MD         SYSTEM ID:  [id number]   XR Chest Port 1 View    Narrative    EXAM: XR CHEST PORT 1 VIEW 2/17/2024 9:05 PM     HISTORY: Endotracheal tube positioning       COMPARISON: 2/17/2024     FINDINGS: Endotracheal tube tip is 5 cm above the nabil. Right IJ  central venous catheter tip projects over the right atrium. Post  surgical changes of bilateral lung transplant. Intact clam shell  sternotomy wires. Stable moderate bilateral pleural effusions.  Unchanged mild patchy interstitial and airspace opacities. Splaying of  the nabil suggestive of left atrial enlargement.       Impression    IMPRESSION:  1.  New endotracheal tube is in good position.  2.  Otherwise unchanged chest with persistent pleural effusions with  mixed interstitial and airspace opacities.    I have personally reviewed the examination and initial interpretation  and I agree with the findings.    ISIDRO NOVAK MD         SYSTEM ID:  [id number]   CT Chest w/o Contrast    Narrative    EXAM: CT CHEST W/O CONTRAST 2/18/2024 12:31 AM    HISTORY: 63 years Female Acute on chronic respiratory failure in a pt  with hx of bilateral lung transplant and worsening opacities on CXR.  Evaluate opacities.    COMPARISON: Chest CT 1/10/2024, CT chest, abdomen and pelvis dated  2/7/2024.    TECHNIQUE: Helical CT imaging of the chest was obtained without  contrast. Multiplanar post-processed  and MIP reformats were obtained.  Images reviewed in soft tissue, bone, and lung windows. Contrast:  None.     FINDINGS:    LINES/TUBES: Right chest Port-A-Cath with tip terminating the right  atrium. Endotracheal tube terminates in the midthoracic trachea.    LOWER NECK: Thyroid unremarkable.    LUNG: Small bilateral pleural effusions with adjacent compressive  atelectasis. Multifocal patchy consolidative opacities throughout both  lungs. Groundglass attenuation, most prominent in the dependent  portions of the lungs. No pneumothorax. Intralobular septal  thickening. Postoperative changes of bilateral lung transplantation.    AIRWAYS: Heterogeneous debris within the low thoracic trachea  extending into the right mainstem bronchus.     VASCULAR: Stable mild enlargement of pulmonary trunk. Mild aortic  atherosclerosis.    CARDIAC: Cardiomegaly. Trace pericardial effusion. Mild coronary  artery atherosclerosis. Anemic blood pool.     MEDIASTINUM/JENNIFER: No overt lymphadenopathy, although evaluation is  limited by extensive soft tissue edema in the mediastinum.    CHEST WALL: Unremarkable.    ESOPHAGUS: Unremarkable.    UPPER ABDOMEN: Limited evaluation due to lack of contrast. 3 mm  calculus in the left upper renal pole. Trace ascites in the upper  abdomen.    MUSCULOSKELETAL: Degenerative changes in keeping with the patient's  age. No acute osseous abnormality. No suspicious osseous lesion.  Clamshell sternotomy wires are intact.    SOFT TISSUES: Anasarca.      Impression    IMPRESSION:   1.  Patchy multifocal consolidative opacities with adjacent  groundglass attenuation throughout the lungs, new/increased since  2/7/2024 CT, concerning for acute infection/inflammatory process with  likely superimposed pulmonary edema.   2.  Small bilateral pleural effusions.  3.  Small volume of frothy debris within the distal trachea and right  mainstem bronchus.  4.  Stable cardiomegaly.    I have personally reviewed the  examination and initial interpretation  and I agree with the findings.    ISIDRO NOVAK MD         SYSTEM ID:  [id number]   CT Head w/o Contrast    Narrative    EXAM: CT HEAD W/O CONTRAST  2/18/2024 12:31 AM     HISTORY: AMS, recent fall with head trauma, rule out new structural  intracranial anomaly       COMPARISON: Head CT 9/20/2022    TECHNIQUE: Using multidetector thin collimation helical acquisition  technique, axial, coronal and sagittal CT images from the skull base  to the vertex were obtained without intravenous contrast.   (topogram) image(s) also obtained and reviewed.    FINDINGS:  No acute intracranial hemorrhage, mass effect, or midline shift. No  acute loss of gray-white matter differentiation. Ventricles are  proportionate to the cerebral sulci. Clear basal cisterns. Patchy  white matter hypoattenuation, most likely represents chronic small  vessel ischemic disease. Atraumatic calvarium. Clear paranasal  sinuses, mastoid air cells. Right periorbital/upper eyelid  hyperdensity, most likely represents hematoma in this patient with  history of fall, measuring 1.5 x 0.9 cm.      Impression    IMPRESSION: No acute intracranial pathology.     I have personally reviewed the examination and initial interpretation  and I agree with the findings.    NIC BADILLO MD         SYSTEM ID:  [id number]

## 2024-02-18 NOTE — H&P
MEDICAL ICU H&P  02/18/2024    Date of Hospital Admission: 2/10/2024  Date of ICU Admission: 2/18/2024  Reason for Critical Care Admission: Acute hypoxic and hypercarbic respiratory failure  Date of Service (when I saw the patient): 02/18/2024    ASSESSMENT: Sofie Rodriguez is a 63 year old female with PMH COPD s/p bilateral lung transplant 6/28/22 c/b hemidiaphragm palsy and recurrent pneumonias, gastroparesis and small bowel dysmotility complicated by severe malnutrition now s/p PEG/J, ESRD on T/Th/Sat HD, recent R femoral fx s/p ORIF who was admitted to [facility] on 2/10/2024 for concerns over malnutrition and TPN initiation via portacath, now transferred to MICU for acute hypoxic and hypercarbic respiratory failure requiring mechanical ventilatory support.     CHANGES and MAJOR THINGS TODAY:   - Repeat VBG, consider adjustments as indicated  - CXR  - Continue abx  - RASS goal -2 to -3 overnight    PLAN:    Neuro:  # Pain and sedation  Patient is sedated for mechanical ventilation.  - RASS goal -2 to -3  - Propofol gtt, fentanyl gtt     # Encephalopathy secondary to hypercarbia   Patient was progressively more lethargic on 2/17 during admission in [facility]. Etiology likely secondary to hypercarbia in context of respiratory failure as patient was noted to have high CO2s concomitant with lethargy. Though receiving oxycodone and fentanyl, lethargy was only partially alleviated by narcan. LFTs and ammonia wnl with low suspicion of hepatic encephalopathy. BUN wnl with low suspicion for uremic encephalopathy. Head CT was negative with low suspicion of acute intracranial pathology.     Pulmonary:  # S/P Lung transplant 2022  # Recurrent pneumonia   # Acute hypoxic and hypercarbic respiratory failure  # Respiratory acidosis  Patient received a bilateral lung transplant 6/28/22 for COPD. Was admitted 2/10 to address malnutrition needs (see below) however hospital course was complicated by worsening  oxygen requirements. Imaging with increased bilateral opacities on CXR 2/17. VBG with PCO2 at 106, increased from baseline pCO2s of ~70-80. Eventually progressed to respiratory failure requiring mechanical ventilatory support. Likely pulmonary edema given hx of ESRD requiring HD vs infection given hx of lung transplant, immunosuppressed status, and recurrent pneumonias.   - PTA immunosuppressive agent  > Prednisone 5 mg every morning, 2.5 mg every afternoon; tacrolimus 4 mg every morning, 4 mg every afternoon  > Monitor tacro levels, goal 7-9  - PTA dapsone MWF for PJP prophylaxis  - Follow-up on BAL and diagnostics   - Continue empiric antibiotics pending cultures and sensitivities  - Follow-up resp viral panel (see ID)  - Repeat VBG on arrival, last VBG acidotic, may adjust rate per new VBG  - HD as indicated (see below)    Cardiovascular:  # HTN  # A-fib  Noted atrial fibrillation on arrival with HR elevated at 150, not sustained for > 10 minutes and otherwise hemodynamically stable. Rates stable in the 70's.  - Can unhold PTA metoprolol 25mg BID if sustained A-fib with RVR  - Continuous telemetry    # Possible volume overload, possible HF  Patient is on T/Th/Sat HD for ESRD. Had an elevated BNP during admission. Given decompensation in respiratory status, possibly had volume overload vs HF leading to pulmonary edema.   - HD as indicated (see below)  - Repeat echo  > CHO 2/28/23: EF 55-60%. No WMA. Normal RV size/fxn, normal PASP.     GI/Nutrition:  # Gastroparesis, small bowel dysmotility  # S/P PEG/J with intolerance of enteral nutrition   # Severe malnutrition  # Hypoalbuminemia   Patient with gastroparesis (presumed due to vagal injury) and small bowel dysmotility complicated by unintentional 40lb weight loss over the past year and now severe malnutrition. Reportedly intolerant to oral food intake due to nausea. Reportedly had LE edema due to hypoalbuminemia. Was initially admitted for portacath and TPN  initiation since 2/13.   - Continue TPN  - Monitor refeeding labs   - Elevate/wrap legs for lymphedema     # GERD  - PTA PPI    Renal/Fluids/Electrolytes:  # ESRD on HD T/Th/Sat  # Hypervolemic hyponatremia  - Consult renal for HD for volume removal   - Strict I/Os    Endocrine:  # Low T4  Patient with new low T4 at 0.64 and TSH at 6.5, concerning for new hypothyroidism vs acute illness.    - Consider levothyroxine in the AM    ID:  # Concern for PNA   # Hx EBV viremia   # Hypogammaglobulinemia  # Chronic immunosuppression  Patient has been afebrile and has not had leukocytosis however she is under immunosuppression. Given acute respiratory failure and hx of recurrent pneumonias, she was started on empiric abx for concerns over new pneumonia. RVP and cultures no growth to date.  - Follow-up on BAL and diagnostics   Cultures   2/17 BC - NGTD  2/17 COVID - NEG  2/17 Sputum - NGTD  2/17 RVP   2/17 MRSA   2/18 UA w/reflex   Antibiotics  Vancomycin (2/17 - )  Zosyn (2/17 - )  Dapsone MWF, PJP ppx   Immunosuppression  Tacrolimus  Prednisone     Hematology:    # Acute on chronic Anemia 2/2 ESRD  On Venofer injections 50 qwk, Mircera last dose 1/9, 2/18 AM Hgb 6.3 requiring transfusion, repeat Hgb corrected appropriately to 7.5. No evidence of active bleeding, however she has multiple bruises from recent fall. Will continue to monitor.  - Trend Hgb    Musculoskeletal:  # Right hip fracture s/p ORIF (December 2023)   - PT/OT    Skin:  # Facial and neck bruising  -Diligent skin cares    General Cares/Prophylaxis:    DVT Prophylaxis: Heparin SQ  GI Prophylaxis: PPI  Restraints: none  Family Communication: Update daughters Charity and Julia and sister Silvia  Code Status: Full    Lines/tubes/drains:  - CVC RIJ  - PIV x2  - ETT  - PEG  - HD AV fistula    Disposition:  - Medical ICU     Patient seen and findings/plan discussed with medical ICU staff, Dr. Burger.    Reji Porter    Resident/Fellow Attestation   IMaribell,  MD, was present with the medical student who participated in the service and in the documentation of the note.  I have verified the history and personally performed the physical exam and medical decision making.  I agree with the assessment and plan of care as documented in the note.      Maribell Cloud MD  Internal Medicine - Pediatrics PGY1  Date of Service (when I saw the patient): 02/18/24        Clinically Significant Risk Factors        # Hypokalemia: Lowest K = 2.8 mmol/L in last 2 days, will replace as needed     # Hypomagnesemia: Lowest Mg = 1.5 mg/dL in last 2 days, will replace as needed   # Hypoalbuminemia: Lowest albumin = 2.6 g/dL at 2/18/2024  5:13 AM, will monitor as appropriate             # Severe Malnutrition: based on nutrition assessment    # Financial/Environmental Concerns: none                  -----------------------------------------------------------------------    HISTORY PRESENTING ILLNESS:   Patient intubated and sedated, unable to gather HPI from patient. HPI copied from H&P 2/17/2024  Sofie Rodriguez is a 63 year old female with PMH notable for bilateral lung transplant in 2022 for COPD, ESRD on HD (T/Th/S), gastroparesis s/p PEG/J, severe malnutrition, recent R femoral fracture s/p ORIF in [place] [facility]. Admitted 2/10/24 to [facility] for FTT and small bowel dysmotility for port-a-cath placement and TPN initiation. Transferred to ICU evening of 2/17/24 for acute on chronic hypoxic and hypercarbic respiratory failure with severe hypercarbia and AMS requiring BiPAP, ddx includes pulmonary edema vs infection, possible mixed picture.     Initially admitted 2/10/24 for severe malnutrition and FTT for tunneled right IJ placement and initiation of TPN.  Requiring daily dialysis for volume overload in the setting of TPN.  On 2/17/2024 patient noted to be more lethargic.  Workup notable for VBG 7.15/98/42/34, which is the most likely culprit of her AMS.  Transferred to ICU and placed on BiPAP, but  VBG worsened and patient subsequently intubated.  Currently undergoing infectious workup with pan cultures and chest CT.  Head CT negative for acute intracranial anomalies.  Broad-spectrum antibiotics started with vancomycin and Zosyn.     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    Hepatitis 2017    Hep C, [facility]    History of blood transfusion     HTN (hypertension)     Infectious mononucleosis     Lung infection 11/30/2022    Osteopenia      SURGICAL HISTORY:  Past Surgical History:   Procedure Laterality Date    BRONCHOSCOPY (RIGID OR FLEXIBLE), DIAGNOSTIC N/A 08/02/2022    Procedure: BRONCHOSCOPY, DIAGNOSTIC- inspection Bronch;  Surgeon: Kamala Lovell MD;  Location: [facility]    BRONCHOSCOPY (RIGID OR FLEXIBLE), DIAGNOSTIC N/A 09/13/2022    Procedure: INSPECTION BRONCHOSCOPY, WITH BRONCHOALVEOLAR LAVAGE;  Surgeon: Jose R Mccullough MD;  Location: [facility]    BRONCHOSCOPY (RIGID OR FLEXIBLE), DIAGNOSTIC N/A 11/09/2022    Procedure: BRONCHOSCOPY, WITH BRONCHOALVEOLAR LAVAGE AND BIOPSY;  Surgeon: Cesar Lima MD;  Location: [facility]    BRONCHOSCOPY (RIGID OR FLEXIBLE), DIAGNOSTIC N/A 01/25/2023    Procedure: BRONCHOSCOPY, WITH BRONCHOALVEOLAR LAVAGE AND BIOPSY;  Surgeon: Mason Reddy MD;  Location: [facility]    BRONCHOSCOPY (RIGID OR FLEXIBLE), DIAGNOSTIC N/A 04/19/2023    Procedure: BRONCHOSCOPY, WITH BRONCHOALVEOLAR LAVAGE AND BIOPSY;  Surgeon: Kamala Lovell MD;  Location: [facility]    BRONCHOSCOPY (RIGID OR FLEXIBLE), DIAGNOSTIC N/A 07/12/2023    Procedure: BRONCHOSCOPY, WITH BRONCHOALVEOLAR LAVAGE AND BIOPSY;  Surgeon: Cesar Lima MD;  Location: [facility]    BRONCHOSCOPY FLEXIBLE AND RIGID N/A  07/19/2022    Procedure: BRONCHOSCOPY inspection only;  Surgeon: Bob Liao MD;  Location:  GI    COLONOSCOPY  2015    CORONARY ANGIOGRAPHY ADULT ORDER      CV CORONARY ANGIOGRAM N/A 06/30/2021    Procedure: CV CORONARY ANGIOGRAM;  Surgeon: Alexander Cuellar MD;  Location:  HEART CARDIAC CATH LAB    CV RIGHT HEART CATH MEASUREMENTS RECORDED N/A 06/30/2021    Procedure: CV RIGHT HEART CATH;  Surgeon: Alexander Cuellar MD;  Location:  HEART CARDIAC CATH LAB    ENDOSCOPIC PERORAL MYOTOMY N/A 10/09/2023    Procedure: MYOTOMY, ESOPHAGUS, ENDOSCOPIC, ORAL APPROACH;  Surgeon: Gonzalez Navarro MD;  [facility]    ENDOSCOPIC RETROGRADE CHOLANGIOPANCREATOGRAM N/A 08/11/2022    Procedure: ENDOSCOPIC RETROGRADE CHOLANGIOPANCREATOGRAPHY WITH PANCREATIC DUCT NEEDLE KNIFE AND STENT PLACEMENT, BILE DUCT SPHINCTEROTOMY, BLOOD/DEBRIS REMOVAL AND STENT PLACEMENT;  Surgeon: Cosmo Arroyo MD;  [facility]    ENDOSCOPIC RETROGRADE CHOLANGIOPANCREATOGRAM N/A 10/07/2022    Procedure: ENDOSCOPIC RETROGRADE CHOLANGIOPANCREATOGRAPHY with biliary and pancreatic stent removal, debris removal;  Surgeon: Cosmo Arroyo MD;  Location:  OR    ENT SURGERY  1974    tonsillectomy    ENTEROSCOPY SMALL BOWEL N/A 08/11/2022    Procedure: SMALL BOWEL ENTEROSCOPY;  Surgeon: Cosmo Arroyo MD;  [facility]    ESOPHAGOGASTRODUODENOSCOPY, WITH NASOGASTRIC TUBE INSERTION N/A 07/01/2022    Procedure: ESOPHAGOGASTRODUODENOSCOPY, WITH NASOJEJUNAL TUBE INSERTION;  Surgeon: Ozzy Nickerson MD;  Location:  GI    ESOPHAGOSCOPY, GASTROSCOPY, DUODENOSCOPY (EGD), COMBINED N/A 08/03/2022    Procedure: ESOPHAGOGASTRODUODENOSCOPY (EGD);  Surgeon: Ira Andres MD;  Location:  GI    ESOPHAGOSCOPY, GASTROSCOPY, DUODENOSCOPY (EGD), COMBINED N/A 01/25/2023    Procedure: ESOPHAGOGASTRODUODENOSCOPY (EGD) with botox injection;  Surgeon: Gonzalez Navarro MD;  Location:  GI    ESOPHAGOSCOPY, GASTROSCOPY,  DUODENOSCOPY (EGD), COMBINED N/A 10/09/2023    Procedure: ESOPHAGOGASTRODUODENOSCOPY;  Surgeon: Gonzalez Navarro MD;  [facility]: [facility]    HAND SURGERY      INSERT CHEST TUBE Right 09/13/2022    Procedure: Insert chest tube;  Surgeon: Jose R Mccullough MD;  Location: [facility]    IR CVC TUNNEL PLACEMENT > 5 YRS OF AGE  09/26/2022    IR GASTRO JEJUNOSTOMY TUBE CHANGE  08/31/2022    IR GASTRO JEJUNOSTOMY TUBE CHANGE  12/21/2022    IR GASTRO JEJUNOSTOMY TUBE CHANGE  07/12/2023    IR GASTRO JEJUNOSTOMY TUBE CHANGE  08/18/2023    IR GASTRO JEJUNOSTOMY TUBE CHANGE  11/14/2023    IR GASTRO JEJUNOSTOMY TUBE PLACEMENT  07/27/2022    IR THORACENTESIS  08/29/2022    LEEP TX, CERVICAL  04/07/2017    HECTOR III    LYMPH NODE BIOPSY Left 2005    Left axilla, benign- Eugenio Saenz    MIDLINE INSERTION - DOUBLE LUMEN Left 07/28/2022    20cm, Basilic vein    REPLACE GASTROJEJUNOSTOMY TUBE, PERCUTANEOUS  10/07/2022    Procedure: Replace Gastrojejunostomy Tube;  Surgeon: Cosmo Arroyo MD;  [facility]    THORACENTESIS Left 08/29/2022    Procedure: THORACENTESIS;  Surgeon: Bo Capone PA-C;  Location: [facility] OR    THORACENTESIS Left 09/13/2022    Procedure: Thoracentesis;  Surgeon: Jose R Mccullough MD;  Location: [facility]    THROMBECTOMY UPPER EXTREMITY Left 07/02/2022    Procedure: LEFT RADIAL ARM THROMBECTOMY;  Surgeon: Christie Graham MD;  Location:  OR    TRANSPLANT LUNG RECIPIENT SINGLE X2 Bilateral 06/28/2022    Procedure: Clamshell Incision, Bilateral Sequential Lung Transplant, On Cardiopulmonary Bypass, Flexible Bronchoscopy;  Surgeon: Sue Sunshine MD;  Location: [facility]     SOCIAL HISTORY:  Social History     Socioeconomic History    Marital status:    Tobacco Use    Smoking status: Former     Packs/day: 0.50     Years: 30.00     Additional pack years: 0.00     Total pack years: 15.00     Types: Cigarettes     Quit date: 11/11/2020     Years since quitting: 3.2    Smokeless tobacco: Never   Substance  and Sexual Activity    Alcohol use: Not Currently     Comment: Stopped drinking in 2020    Drug use: Not Currently     Types: Marijuana, Methamphetamines     Comment: hx:marijuana and methamphetamine-quit both unsure ?  2-3 yrs ago     Social Determinants of Health     Interpersonal Safety: Not At Risk (9/1/2023)    Humiliation, Afraid, Rape, and Kick questionnaire     Fear of Current or Ex-Partner: No     Emotionally Abused: No     Physically Abused: No     Sexually Abused: No     FAMILY HISTORY:   No family history on file.  ALLERGIES:   Allergies   Allergen Reactions    Blood Transfusion Related (Informational Only) Other (See Comments)     Patient has a history of a clinically significant antibody against RBC antigens.  A delay in compatible RBCs may occur.     No Clinical Screening - See Comments Other (See Comments)     Patient has a history of a clinically significant antibody against RBC antigens.  A delay in compatible RBCs may occur.    Azithromycin Rash     12/1/22: Developed a rash that is not raised and looks diffuse in nature. It started in the groin and up the back and has now worked its way up her chest into her face. Pt states that it has now started to itch. She is breathing and talking normally and denies any airway changes. Unclear what started rash. Pt noted feeling somewhat itchy yesterday.    Ganciclovir Rash     12/1/22: Developed a rash that is not raised and looks diffuse in nature. It started in the groin and up the back and has now worked its way up her chest into her face. Pt states that it has now started to itch. She is breathing and talking normally and denies any airway changes. Unclear what started rash. Pt noted feeling somewhat itchy yesterday.     MEDICATIONS:  No current facility-administered medications on file prior to encounter.  acetaminophen (TYLENOL) 500 MG tablet, 500-1,000 mg by Per J Tube route 3 times daily as needed for mild pain  B Complex-C-Folic Acid (DIALYVITE)  TABS, 1 tablet by Per J Tube route every morning  Calcium Carbonate-Vitamin D 600-10 MG-MCG TABS, 1 tablet by Per J Tube route 3 times daily  dapsone 2 mg/mL SUSP, 25 mLs (50 mg) by Per J Tube route Every Mon, Wed, Fri Morning  furosemide (LASIX) 40 MG tablet, 40 mg by Per J Tube route 2 times daily  loperamide (IMODIUM A-D) 2 MG tablet, 1 tablet (2 mg) by Per J Tube route 4 times daily as needed for diarrhea  metoprolol tartrate (LOPRESSOR) 50 MG tablet, 0.5 tablets (25 mg) by Per Feeding Tube route 2 times daily  multivitamin (CENTRUM SILVER) tablet, Take 1 tablet by mouth daily  pantoprazole (PROTONIX) 2 mg/mL SUSP suspension, 20 mLs (40 mg) by Per J Tube route 2 times daily  predniSONE (DELTASONE) 5 MG tablet, Take 1 tablet (5 mg) by mouth every morning AND 0.5 tablets (2.5 mg) every evening. Take 1 tab (5mg) at AM and 1/2 tab (2.5) in pm (Patient taking differently: Take 1 tablet (5 mg) by J-tube every morning AND 0.5 tablets (2.5 mg) every evening. Take 1 tab (5mg) at AM and 1/2 tab (2.5) in pm)  protein modular (PROSOURCE TF) LIQD, 1 packet by Per Feeding Tube route daily (Patient taking differently: 1 packet by Per Feeding Tube route daily at 2 pm)  sevelamer carbonate, RENVELA, 0.8 GM PACK Packet, Take 1 packet (0.8 g) by mouth 3 times daily (with meals) (Patient taking differently: 0.8 g by Per J Tube route 3 times daily (with meals))  tacrolimus (GENERIC) 1 mg/mL suspension, Take 4 mLs (4 mg) by mouth every morning AND 4 mLs (4 mg) every evening. (Patient taking differently: Take 4 mLs (4 mg) by j-tube every morning AND 4 mLs (4 mg) every evening.)  vitamin B-12 (CYANOCOBALAMIN) 500 MCG tablet, 1 tablet (500 mcg) by Per Feeding Tube route daily  [DISCONTINUED] albuterol (PROAIR HFA/PROVENTIL HFA/VENTOLIN HFA) 108 (90 BASE) MCG/ACT Inhaler, Inhale 2 puffs into the lungs every 6 hours as needed for shortness of breath / dyspnea or wheezing  [DISCONTINUED] insulin glargine (LANTUS PEN) 100 UNIT/ML pen,  Inject 7 Units Subcutaneous 2 times daily  [DISCONTINUED] ipratropium - albuterol 0.5 mg/2.5 mg/3 mL (DUONEB) 0.5-2.5 (3) MG/3ML neb solution, Inhale 1 vial into the lungs every 4 hours as needed for shortness of breath / dyspnea  [DISCONTINUED] mycophenolate (GENERIC EQUIVALENT) 200 MG/ML suspension, 3.75 mLs (750 mg) by Per J Tube route 2 times daily  [DISCONTINUED] valGANciclovir (VALCYTE) 50 MG/ML solution, 9 mLs (450 mg) by Oral or Feeding Tube route three times a week (Patient taking differently: 450 mg by Oral or Feeding Tube route three times a week Take on Mon/Wed/Fri)        PHYSICAL EXAMINATION:  Temp:  [97.3  F (36.3  C)-97.9  F (36.6  C)] 97.6  F (36.4  C)  Pulse:  [66-95] 75  Resp:  [12-31] 18  BP: ()/(52-84) 143/74  FiO2 (%):  [30 %-45 %] 30 %  SpO2:  [93 %-100 %] 100 %  General: Intubated and sedated, responding to noxious stimuli only  HEENT: Normocephalic, bruising on the right face around right orbit with hematoma and right neck. Moist mucus membranes, PERRL  Neuro: Intubated and sedated, NAD, responding to noxious stimuli, will move extremities  Pulm/Resp: Clear breath sounds bilaterally without rhonchi, crackles or wheeze, breathing non-labored, diminished breath sounds in the left lower lobe  CV: Irregular rhythm, regular rate, no murmur, warm extremities, capillary refill < 1 second on upper and lower extremities, palpable pedal pulses bilaterally, 2+ pitting edema on the lower extremities  Abdomen: Non-distended, non-tender, hypoactive bowel sounds, involuntary guarding, PEG in place without erythema or drainage  Incisions/Skin: Multiple bruises scattered over the right upper and lower extremities primarily    LABS: Reviewed.   Arterial Blood Gases   Recent Labs   Lab 02/17/24 2120   PH 7.25*   PCO2 78*   PO2 34*   HCO3 34*     Complete Blood Count   Recent Labs   Lab 02/18/24  0842 02/17/24  1830 02/16/24  0703 02/15/24  0620 02/14/24  0807 02/13/24  0700   WBC 3.0* 4.2  --   --  4.5  4.5   HGB 6.3* 7.8*  --  8.6* 8.2* 9.1*   * 145* 183  --  175 191     Basic Metabolic Panel  Recent Labs   Lab 02/18/24  0828 02/18/24  0513 02/18/24  0406 02/18/24  0202 02/18/24  0201 02/17/24  1953 02/17/24  1830 02/17/24  1806 02/17/24  1243 02/16/24  1820 02/16/24  0703   NA  --  131*  --   --   --   --  130*  --  130*  --  130*   POTASSIUM  --  3.8  --   --  2.8*  --  3.6  --  3.9  --  4.0   CHLORIDE  --  95*  --   --   --   --  94*  --  94*  --  93*   CO2  --  30*  --   --   --   --  31*  --  25  --  27   BUN  --  21.8  --   --   --   --  17.4  --  31.7*  --  32.2*   CR  --  2.17*  --   --   --   --  1.81*  --  2.86*  --  3.14*   * 106* 92 99  --    < > 131*   < > 233*   < > 181*    < > = values in this interval not displayed.     Liver Function Tests  Recent Labs   Lab 02/18/24  0513 02/17/24 1830 02/17/24 1243 02/16/24  0703 02/14/24  0807 02/13/24  1658 02/13/24  0700   AST 15 13 15 17   < >  --  31   ALT <5 <5 <5 <5   < >  --  8   ALKPHOS 51 62 79 66   < >  --  56   BILITOTAL 0.2 0.2 0.2 <0.2   < >  --  0.2   ALBUMIN 2.6* 3.1* 3.1* 3.1*   < >  --  3.1*   INR  --  0.90  --   --   --  0.92 0.97    < > = values in this interval not displayed.     Coagulation Profile  Recent Labs   Lab 02/17/24 1830 02/13/24 1658 02/13/24  0700   INR 0.90 0.92 0.97       IMAGING:  Recent Results (from the past 24 hour(s))   XR Chest Port 1 View    Narrative    EXAM: XR CHEST PORT 1 VIEW 2/17/2024 6:18 PM     HISTORY: respiratory failure       COMPARISON: Radiographs 2/17/2024, CT 2/7/2024     FINDINGS: Right IJ central venous catheter tip projects over the  superior cavoatrial junction. Stable cardiac silhouette. Clamshell  sternotomy wires. Postsurgical changes of bilateral lung transplant.  Slightly improved streaky perihilar and bibasilar opacities. Unchanged  small bilateral pleural effusions. No pneumothorax.       Impression    IMPRESSION:  Slightly improved pulmonary edema and unchanged bilateral  pleural  effusions compared to the 9:34 AM radiograph. Otherwise, no  significant change.    I have personally reviewed the examination and initial interpretation  and I agree with the findings.    JOHANN MORAES MD         SYSTEM ID:  [id number]   XR Chest Port 1 View    Narrative    EXAM: XR CHEST PORT 1 VIEW 2/17/2024 9:05 PM     HISTORY: Endotracheal tube positioning       COMPARISON: 2/17/2024     FINDINGS: Endotracheal tube tip is 5 cm above the nabil. Right IJ  central venous catheter tip projects over the right atrium. Post  surgical changes of bilateral lung transplant. Intact clam shell  sternotomy wires. Stable moderate bilateral pleural effusions.  Unchanged mild patchy interstitial and airspace opacities. Splaying of  the nabil suggestive of left atrial enlargement.       Impression    IMPRESSION:  1.  New endotracheal tube is in good position.  2.  Otherwise unchanged chest with persistent pleural effusions with  mixed interstitial and airspace opacities.    I have personally reviewed the examination and initial interpretation  and I agree with the findings.    ISIDRO NOVAK MD         SYSTEM ID:  [id number]   CT Chest w/o Contrast    Narrative    EXAM: CT CHEST W/O CONTRAST 2/18/2024 12:31 AM    HISTORY: 63 years Female Acute on chronic respiratory failure in a pt  with hx of bilateral lung transplant and worsening opacities on CXR.  Evaluate opacities.    COMPARISON: Chest CT 1/10/2024, CT chest, abdomen and pelvis dated  2/7/2024.    TECHNIQUE: Helical CT imaging of the chest was obtained without  contrast. Multiplanar post-processed and MIP reformats were obtained.  Images reviewed in soft tissue, bone, and lung windows. Contrast:  None.     FINDINGS:    LINES/TUBES: Right chest Port-A-Cath with tip terminating the right  atrium. Endotracheal tube terminates in the midthoracic trachea.    LOWER NECK: Thyroid unremarkable.    LUNG: Small bilateral pleural effusions with adjacent  compressive  atelectasis. Multifocal patchy consolidative opacities throughout both  lungs. Groundglass attenuation, most prominent in the dependent  portions of the lungs. No pneumothorax. Intralobular septal  thickening. Postoperative changes of bilateral lung transplantation.    AIRWAYS: Heterogeneous debris within the low thoracic trachea  extending into the right mainstem bronchus.     VASCULAR: Stable mild enlargement of pulmonary trunk. Mild aortic  atherosclerosis.    CARDIAC: Cardiomegaly. Trace pericardial effusion. Mild coronary  artery atherosclerosis. Anemic blood pool.     MEDIASTINUM/JENNIFER: No overt lymphadenopathy, although evaluation is  limited by extensive soft tissue edema in the mediastinum.    CHEST WALL: Unremarkable.    ESOPHAGUS: Unremarkable.    UPPER ABDOMEN: Limited evaluation due to lack of contrast. 3 mm  calculus in the left upper renal pole. Trace ascites in the upper  abdomen.    MUSCULOSKELETAL: Degenerative changes in keeping with the patient's  age. No acute osseous abnormality. No suspicious osseous lesion.  Clamshell sternotomy wires are intact.    SOFT TISSUES: Anasarca.      Impression    IMPRESSION:   1.  Patchy multifocal consolidative opacities with adjacent  groundglass attenuation throughout the lungs, new/increased since  2/7/2024 CT, concerning for acute infection/inflammatory process with  likely superimposed pulmonary edema.   2.  Small bilateral pleural effusions.  3.  Small volume of frothy debris within the distal trachea and right  mainstem bronchus.  4.  Stable cardiomegaly.    I have personally reviewed the examination and initial interpretation  and I agree with the findings.    ISIDRO NOVAK MD         SYSTEM ID:  [id number]   CT Head w/o Contrast    Narrative    EXAM: CT HEAD W/O CONTRAST  2/18/2024 12:31 AM     HISTORY: AMS, recent fall with head trauma, rule out new structural  intracranial anomaly       COMPARISON: Head CT 9/20/2022    TECHNIQUE: Using  multidetector thin collimation helical acquisition  technique, axial, coronal and sagittal CT images from the skull base  to the vertex were obtained without intravenous contrast.   (topogram) image(s) also obtained and reviewed.    FINDINGS:  No acute intracranial hemorrhage, mass effect, or midline shift. No  acute loss of gray-white matter differentiation. Ventricles are  proportionate to the cerebral sulci. Clear basal cisterns. Patchy  white matter hypoattenuation, most likely represents chronic small  vessel ischemic disease. Atraumatic calvarium. Clear paranasal  sinuses, mastoid air cells. Right periorbital/upper eyelid  hyperdensity, most likely represents hematoma in this patient with  history of fall, measuring 1.5 x 0.9 cm.      Impression    IMPRESSION: No acute intracranial pathology.     I have personally reviewed the examination and initial interpretation  and I agree with the findings.    NIC BADILLO MD         SYSTEM ID:  [id number]   US Upper Extremity Venous Duplex Left    Narrative    EXAMINATION: US UPPER EXTREMITY VENOUS DUPLEX LEFT  2/18/2024 10:01 AM       CLINICAL HISTORY: unilateral swelling, rule out DVT    COMPARISON: Ultrasound 7/8/2022        PROCEDURE COMMENTS: Ultrasound was performed of the deep venous system  of the left upper extremity using grayscale, color, and spectral  Doppler.    FINDINGS:  The internal jugular, brachiocephalic, subclavian, brachial, basilic  and cephalic veins are visualized and are patent. Venous waveforms are  normal with arterial type waveforms noted in the visualized  hemodialysis fistula. There is normal response to compression. Edema  noted in the superficial subcutaneous soft tissues.      Impression    IMPRESSION:  No deep venous thrombosis in the left upper extremity.    I have personally reviewed the examination and initial interpretation  and I agree with the findings.    ISIDRO NOVAK MD         SYSTEM ID:  [id number]   Echo Complete    Result Value    LVEF  55-60%    [facility]    [id number]  [id number]  [id number]  [id number]^ANUJA^JUAN^MAC     [facility]  [address] [place] [address]     Name: ALMAS CASIANO  MRN: [id number]  : 1960  Study Date: 2024 09:22 AM  Age: 63 yrs  Gender: Female  Patient Location: [facility]  Reason For Study: CHF  Ordering Physician: JUAN LICEA  Referring Physician: MICHELE SOSA  Performed By: Kateryna Concepcion     BSA: 1.4 m2  Height: 61 in  Weight: 100 lb  BP: 128/75 mmHg  ______________________________________________________________________________  Procedure  Complete Portable Echo Adult.  ______________________________________________________________________________  Interpretation Summary  Global and regional left ventricular function is normal with an EF of 55-60%.  Global right ventricular function is normal. The right ventricle is normal  size.  No significant valvular abnormalities present.  IVC diameter <2.1 cm collapsing >50% with sniff suggests a normal RA pressure  of 3 mmHg.  This study was compared with the study from 2023. No significant changes  noted.  ______________________________________________________________________________  Left Ventricle  Global and regional left ventricular function is normal with an EF of 55-60%.  Left ventricular size is normal. Left ventricular wall thickness is normal.  Left ventricular diastolic function is indeterminate.     Right Ventricle  Global right ventricular function is normal. The right ventricle is normal  size.     Atria  Severe left atrial enlargement is present. Severe right atrial enlargement is  present.     Mitral Valve  Mild mitral annular calcification is present. Mild mitral insufficiency is  present.     Aortic Valve  The aortic valve is tricuspid. On Doppler interrogation, there is no  significant stenosis or regurgitation.      Tricuspid Valve  The valve leaflets are not well visualized. Trace tricuspid insufficiency is  present. The right ventricular systolic pressure is approximated at 9.1 mmHg  plus the right atrial pressure. The RVSP is probably underestimated.     Pulmonic Valve  The valve leaflets are not well visualized. Trace pulmonic insufficiency is  present.     Vessels  The aorta root is normal. The thoracic aorta is normal. IVC diameter <2.1 cm  collapsing >50% with sniff suggests a normal RA pressure of 3 mmHg.     Pericardium  No pericardial effusion is present.     Miscellaneous  No significant valvular abnormalities present.     Compared to Previous Study  This study was compared with the study from 2023 . No significant  changes noted.  ______________________________________________________________________________  MMode/2D Measurements & Calculations  IVSd: 0.97 cm  LVIDd: 4.5 cm  LVIDs: 2.6 cm  LVPWd: 0.80 cm  FS: 42.6 %  LV mass(C)d: 130.8 grams  LV mass(C)dI: 92.9 grams/m2  Ao root diam: 3.3 cm  asc Aorta Diam: 3.4 cm  LVOT diam: 2.1 cm  LVOT area: 3.5 cm2  Ao root diam index Ht(cm/m): 2.1  Ao root diam index BSA (cm/m2): 2.3  Asc Ao diam index BSA (cm/m2): 2.4  Asc Ao diam index Ht(cm/m): 2.2  LA Volume (BP): 115.0 ml     LA Volume Index (BP): 81.6 ml/m2  RWT: 0.35  TAPSE: 1.7 cm     Doppler Measurements & Calculations  MV E max julito: 73.5 cm/sec  MV A max julito: 55.7 cm/sec  MV E/A: 1.3  MV dec slope: 240.0 cm/sec2  MV dec time: 0.25 sec  Ao V2 max: 151.0 cm/sec  Ao max P.1 mmHg  Ao V2 mean: 102.6 cm/sec  Ao mean P.5 mmHg  Ao V2 VTI: 31.2 cm  KELLIE(I,D): 2.2 cm2  KELLIE(V,D): 2.1 cm2  LV V1 max PG: 3.4 mmHg  LV V1 max: 91.7 cm/sec  LV V1 VTI: 19.5 cm  SV(LVOT): 67.4 ml  SI(LVOT): 47.9 ml/m2  TR max julito: 151.0 cm/sec  TR max P.1 mmHg     AV Julito Ratio (DI): 0.61  KELLIE Index (cm2/m2): 1.5  E/E' av.2  Lateral E/e': 8.3  Medial E/e': 14.0  RV S Julito: 7.8 cm/sec      ______________________________________________________________________________  Report approved by: Mary Donald 02/18/2024 10:59 AM

## 2024-02-18 NOTE — PROGRESS NOTES
10A ICU Shift Summary    Major Shift Change  Transferred 4C MICU [place] @1730  Handoff to Pauline Jean  Broncopy [id number]  Transfused with 1 unit PRBC's  Family updated about transfer    Neuro: Afebrile, Rass Goal -1 to -2. following command     CV: SR, Normotensive. genenaralized +3- +4 LUE/ BLE edema, Pulses +1  BLE/BUE     Respiration: Intubated, 7mm Ett, 22cm @teeth. Vent settings 14/5/30%/300. Copious blood-streaked oral secretions, moderate to scant inline      GI: BS active,trickle  TF@10ml/hr, FWF 30ml q4h , TPN via Right CVC @60ml/hr     : oliguric/ ESRD T/Th/S,     Skin:  Facial/ periorbital LUE bruises from recent fall , non blanchable redness  sacral/cocyx redness area. protective mepilaex in place     Pain: CPOT mostly , 2 on Fent @50ml/hr  Lines: (R)PIV x2, (R) single lumen tunneled CVC  Gtt:: Propofol  30 mcg,   Fentanyl 50 mcg  TPN 60 ml/hr        Activities: q2h turns in bed

## 2024-02-18 NOTE — PROGRESS NOTES
Lung Transplant Consult Follow Up Note   February 18, 2024            Assessment and Plan:   Sofie Rodriguez is a 63 year old female with h/o bilateral lung transplant for COPD on 6/28/22 with course complicated by post-operative hemidiaphragm palsy, recurrent PNAs, positive DSA, EBV viremia, hypogammaglobulinemia, severe gastroparesis s/p G/J tube placement 7/27/22 with pyloric botox 1/25/23, GI bleed 2/2 pyloric ulcer, hemobilia s/p ERCP and MRCP, chronic diarrhea, recurrent C diff colitis, and ESRD on HD. Admitted May 2023 for FTT, supposed to be readmitted in July for FTT, but refused. Admitted to OSH 12/4-12/31 in December for right hip fracture s/p ORIF, now with significant deconditioning and ongoing severe malnutrition with gastroparesis, small bowel hypermotility and failure to tolerate any tube feeds. After extensive discussion with the dietician, GI and ultimately convincing the patient, the patient was admitted on 2/10 for initiation of TPN/lipids. She is s/p port placement with persistent pain at port site. GI recommends trickle feeds for maintaining bowel and CT enterography to look for structural abnormalities (unable due to large bolus enteral contrast required for the study). She was transferred to the ICU on 2/17 with worsening mental status, worsening respiratory acidosis despite Bpap use, ultimately requiring intubation and mechanical ventilation. CT chest concerning for new infection and started on empiric antimicrobial therapy (micafungin, zosyn, vanco).      Recommendations:   - bronch with lavage today showing friable airways, send for full immunocompromised panel including flow cytometry  - EBV ordered for tomorrow (last 97k on 2/7)   - broaden antimicrobials to micafungin, zosyn, vancomycin  - fungal blood, peripheral blood today (1x blood culture sent 2/17)  - aspergillus/galactomannan sent   - recommend transplant ID consult tomorrow as cultures result   - head CT w/o acute pathology  -  chest CT with NEW b/l nodular opacities concerning for infection  - vent management per ICU team, appreciated  - Strict I/Os and daily weights  - O2 to keep SaO2 > 92%  - Check Prospera (ordered 2/18)  - tacro level subtherapeutic today (essentially 21 hour trough d/t missed pm dose on 2/15 and 2/17), reinitiate 4.5 mg Qpm and 4 mg Qam (trend level 2/19 and steady state ordered 2/21)  - Sofie is on the transfer list for [facility], confirmed with ANS and discussed with accepting MICU attending     Lethargy: Increased lethargy noted AM 2/17. Initially responded to narcan (received oxycodone this morning) but worse again in the afternoon with worsening respiratory acidosis resulting in intubation and mechanical  Ammonia normal. Head CT without acute pathology. May be secondary to septic encephalopathy given concern for new infection (below).   - wean sedation as tolerated to assess mental status, per MICU service; currently on propofol/fentanyl      S/p bilateral lung transplant:   Right hemidiaphragm palsy:   Suspected CARLEE:  New consolidated nodular opacities and GGO concerning for infection:  seen in clinic last week, severely deconditioned, complicated by above. CMV 2/7 negative. ImmuKnow 108 and cfDNA 0.12 on 1/10. CT 2/7 with decreased MARLON opacities, new tree in bud RLL opacities without new symptoms. PFTs unchanged in clinic (but ATS not met), remain significantly below her baseline (1.4-1.5L). Noted increased dyspnea since the Port-A-Cath placement.  Review of notes indicate patient should be on 2 L o2 at night from prior overnight O2 study in Jan 2023. Also reported a slight increase in cough. She was requiring O2 for comfort only but decompensated on 2/17 with worsening encephalopathy, respiratory acidosis (pCO2 up to 106). Was somewhat responsive to narcan after oxy given earlier (see above). CT chest 2/17 showed very patchy and new consolidative, nodular opacities, GGO primarily in the bases and  intralobular septal thickening concerning for pulmonary edema and volume overload along with new, possibly fungal vs. Atypical infection vs. PTLD (less likely but in the differential) vs. Septic emboli.   - bronch with lavage of RML today showed very friable tissue; 35 ml lavage fluid sent for full immunocompromised panel including flow cytometry, histo, blasto, RVP, galactomannan, fungitell, HSV, cytology, cell count and differential   - BNP >70,000  - Procal 0.38 --> .5 but afebrile; antimicrobials broadened to zosyn/vanco/micafungin 2/18  - blood cultures, including fungal - NGTD  - recommend consult transplant ID   - DSA 2/7 with persistent DQ B2, MFI increased to 6966, see below  - Check Prospera to evaluate significance of positive DSA (ordered)  - IS and Aerobika while awake, when appropriate   - when appropriate: overnight oximetry with SaO2 < 88% for 6:46 (min:sec). O2 initiated for SaO2 77%. 95-99% on2L NC.  Continue nighttime oxygen in the hospital.  Repeat overnight oximetry on room air prior to discharge to qualify for home O2.  - schedule follow up with sleep to discuss possible CPAP given recommendations from August sleep study     Immunosuppression:  - Tacrolimus 4 mg qAM and tacrolimus 4.5 mg qPM. Goal level 7-9. Tacrolimus level on 2/15 of 9.2 at 12 hours. 2/18 subtherapeutic but she missed pm dose on 2/15 and 2/17. Today's level is ~ 21 hour trough and is 4.8. Willcontinue current dosing with trend level on 2/19 and steady state level on 2/21.   - will need to clarify why she is on 2 drug IS only  - Prednisone 5 mg/2.5 mg     Prophylaxis:   - Dapsone 50 mg q MWf for PJP ppx     Positive DSA: no prior treatment for AMR, has been watched for quite some time given no pulmonary symptoms, prior PFTs stability and significant and ongoing failure to thrive. Last DSA improved to 5723, however will ongoing lower PFTs, may need to consider AMR treatment, however, unclear how she would tolerate.   -  DSA 2/7  with persistent DQ B2, MFI increased to 6966, see below  - Check Prospera to evaluate significance of positive DSA (ordered 2/18)     CKD: Dialysis dependent.  Now with increased dyspnea, crackles on exam and increased vascular markings on chest x-ray.  Suspect volume overload secondary to initiation of TPN.  CT chest suggesting volume overload.   -BNP >70,000.  -Dialysis today.  -May require daily dialysis until euvolemic again  -Monitor strict ins and outs and daily weights     EBV viremia: last level of 96K (2/7), CT c/a/p (2/7) without adenopathy.  - EBV ordered for 2/19 given nodular opacities on chest CT      Severe protein calorie malnutrition:  FTT:   Gastroparesis s/p PEG/J s/p botox and G-POEM:   SB Hypomotility  Pyloric ulcer:  Chronic nausea and osmotic diarrhea:  SIBO s/p rifaximin:   Recurrent C diff colitis: chronic diarrhea since transplant with recurrent episodes of C diff. GI previously consulted, felt stools consistent with osmotic diarrhea. Over the last year has lost 40 lbs, unable to tolerate any combination of TF, most recently elemental formula. Normal fecal elastase last May. Following with Dr. Navarro who feels her main two issues are vagal injury induced gastroparesis and probably small bowel hypomotility. Her intolerance to J-tube feeds suggests the latter to be a significant issue (notes in a message that there are no motility experts at the  and he is limited in what can be offered). Now s/p port placement with TPN and lipids. GI recommending trickle feeds and CT to assess for structural issues.   -Recs per primary and GI  -currently receiving TPN and TFs at 10 ml/hr via J tube   -Concern that patient will not tolerate CT enterography according to chart notes as she will require 1500 mL enteral contrast bolus which she is unlikely to tolerate given her gastroparesis and reduced bowel function.     We appreciate the excellent care provided by the ICU team.  Recommendations communicated  via this note and in person.  Will continue to follow along closely, please do not hesitate to call with any questions or concerns.     Susan Miller MD  Pulmonary, Allergy, Critical Care, and Sleep Medicine   [facility]   Pager: [phone]           Interval History:   Intubated overnight for lethargy and worsening respiratory acidosis. On minimal vent settings. Afebrile overnight. PCT worsened. 2 L removed via UF yesterday.           Medications:      acetaminophen  1,000 mg Per J Tube TID    calcium carbonate-vitamin D  1 tablet Per J Tube TID w/meals    chlorhexidine  15 mL Mouth/Throat Q12H    cyanocobalamin  500 mcg Per Feeding Tube Daily    dapsone  50 mg Per J Tube Q Mon Wed Fri AM    heparin ANTICOAGULANT  5,000 Units Subcutaneous Q12H    lipids plant base  250 mL Intravenous Once per day on Monday Wednesday Friday    [Held by provider] metoprolol  25 mg Per J Tube BID    micafungin  100 mg Intravenous Q24H    multivitamin w/minerals  1 tablet Oral or Feeding Tube Daily    pantoprazole  40 mg Per J Tube BID    piperacillin-tazobactam  2.25 g Intravenous Q6H    predniSONE  5 mg Per J Tube QAM    And    predniSONE  2.5 mg Per J Tube QPM    [Held by provider] sevelamer carbonate (RENVELA)  0.8 g Oral BID    tacrolimus  4 mg Per J Tube QAM    And    tacrolimus  4.5 mg Per J Tube QPM    vancomycin place blake - receiving intermittent dosing  1 each Intravenous See Admin Instructions     albumin human, calcium carbonate, dextrose, dextrose, glucose **OR** dextrose **OR** glucagon, fentaNYL, lidocaine 4%, lidocaine (buffered or not buffered), lidocaine-prilocaine, loperamide, naloxone **OR** naloxone **OR** naloxone **OR** naloxone, ondansetron **OR** ondansetron, propofol, senna-docusate **OR** senna-docusate, sodium chloride (PF), sodium chloride 0.9%, - MEDICATION INSTRUCTIONS -         Physical Exam:   Temp:  [97.3  F (36.3  C)-97.9  F (36.6  C)] 97.6  F (36.4  C)  Pulse:  [66-95] 75  Resp:  [12-31]  18  BP: ()/(52-84) 143/74  FiO2 (%):  [30 %-45 %] 30 %  SpO2:  [93 %-100 %] 100 %      Intake/Output Summary (Last 24 hours) at 2/18/2024 1437  Last data filed at 2/18/2024 1400  Gross per 24 hour   Intake 1200.77 ml   Output 2010 ml   Net -809.23 ml         Constitutional:   Intubated and sedated, no acute distress     Eyes:   Nonicteric, PERRL. Right periorbital ecchymosis     ENT:    oral mucosa moist without lesions     Neck:   Supple      Lungs:   Fair air flow.  Bilat lower lung crackles. No rhonchi.  No wheezes.     Cardiovascular:   Normal S1 and S2.  RRR, II/VI sys murmur. No gallop. No rub.     Abdomen:   NABS, soft, nondistended, nontender.  No HSM.     Musculoskeletal:   3+  edema b/l     Neurologic:   Somnolent     Skin:   Warm, dry.  No rash on limited exam.             Data:   All laboratory and imaging data reviewed.    Results for orders placed or performed during the hospital encounter of 02/10/24 (from the past 24 hour(s))   Hepatitis B Surface Antibody   Result Value Ref Range    Hepatitis B Surface Antibody Nonreactive     Hepatitis B Surface Antibody Instrument Value 8.44 <8.5 m[IU]/mL   CBC with Platelets & Differential    Narrative    The following orders were created for panel order CBC with Platelets & Differential.  Procedure                               Abnormality         Status                     ---------                               -----------         ------                     CBC with platelets and d...[[id number]]                                                   Please view results for these tests on the individual orders.   Blood Culture Peripheral Blood    Specimen: Peripheral Blood   Result Value Ref Range    Culture No growth after 12 hours    Extra Tube    Narrative    The following orders were created for panel order Extra Tube.  Procedure                               Abnormality         Status                     ---------                               -----------          ------                     Extra Purple Top Tube[[id number]]                            Final result                 Please view results for these tests on the individual orders.   Extra Purple Top Tube   Result Value Ref Range    Hold Specimen JIC    Hepatitis B surface antigen   Result Value Ref Range    Hepatitis B Surface Antigen Nonreactive Nonreactive   Ammonia   Result Value Ref Range    Ammonia 31 11 - 51 umol/L   Blood gas venous   Result Value Ref Range    pH Venous 7.15 (LL) 7.32 - 7.43    pCO2 Venous 98 (HH) 40 - 50 mm Hg    pO2 Venous 42 25 - 47 mm Hg    Bicarbonate Venous 34 (H) 21 - 28 mmol/L    Base Excess/Deficit Venous 3.8 (H) -3.0 - 3.0 mmol/L    FIO2 35     Oxyhemoglobin Venous 75 70 - 75 %    O2 Sat, Venous 77.2 (H) 70.0 - 75.0 %    Narrative    In healthy individuals, oxyhemoglobin (O2Hb) and oxygen saturation (SO2) are approximately equal. In the presence of dyshemoglobins, oxyhemoglobin can be considerably lower than oxygen saturation.   Glucose by meter   Result Value Ref Range    GLUCOSE BY METER POCT 130 (H) 70 - 99 mg/dL   EKG 12-lead, complete   Result Value Ref Range    Systolic Blood Pressure  mmHg    Diastolic Blood Pressure  mmHg    Ventricular Rate 87 BPM    Atrial Rate 87 BPM    AL Interval 148 ms    QRS Duration 82 ms     ms    QTc 394 ms    P Axis 38 degrees    R AXIS 60 degrees    T Axis 164 degrees    Interpretation ECG       ** Poor data quality, interpretation may be adversely affected  Sinus rhythm with sinus arrhythmia  Possible Left atrial enlargement  Septal infarct (cited on or before 17-MAY-2023)  Abnormal ECG  When compared with ECG of 17-MAY-2023 16:51,  Questionable change in QRS axis  T wave inversion no longer evident in Anterior leads  QT has shortened     XR Chest Port 1 View    Narrative    EXAM: XR CHEST PORT 1 VIEW 2/17/2024 6:18 PM     HISTORY: respiratory failure       COMPARISON: Radiographs 2/17/2024, CT 2/7/2024     FINDINGS: Right IJ central  venous catheter tip projects over the  superior cavoatrial junction. Stable cardiac silhouette. Clamshell  sternotomy wires. Postsurgical changes of bilateral lung transplant.  Slightly improved streaky perihilar and bibasilar opacities. Unchanged  small bilateral pleural effusions. No pneumothorax.       Impression    IMPRESSION:  Slightly improved pulmonary edema and unchanged bilateral pleural  effusions compared to the 9:34 AM radiograph. Otherwise, no  significant change.    I have personally reviewed the examination and initial interpretation  and I agree with the findings.    JOHANN MORAES MD         SYSTEM ID:  [id number]   Asymptomatic COVID-19 Virus (Coronavirus) by PCR Nasopharyngeal    Specimen: Nasopharyngeal; Swab   Result Value Ref Range    SARS CoV2 PCR Negative Negative    Narrative    Testing was performed using the Data[employer] Xpress SARS-CoV-2 Assay on the Cep[employer] Gene-Xpert Instrument Systems. Additional information about this Emergency Use Authorization (EUA) assay can be found via the Lab Guide. This test should be ordered for the detection of SARS-CoV-2 in individuals who meet SARS-CoV-2 clinical and/or epidemiological criteria as well as from individuals without symptoms or other reasons to suspect COVID-19. Test performance for asymptomatic patients has only been established in anterior nasal swab specimens. This test is for in vitro diagnostic use under the FDA EUA for laboratories certified under CLIA to perform high complexity testing. This test has not been FDA cleared or approved. A negative result does not rule out the presence of PCR inhibitors in the specimen or target RNA concentration below the limit of detection for the assay. The possibility of a false negative should be considered if the patient's recent exposure or clinical presentation suggests COVID-19. This test was validated by the [facility]. This laboratory is certified under the Clinical  Laboratory Improvement Amendments (CLIA) as qualified to perform high complexity laboratory testing.     Blood gas venous   Result Value Ref Range    pH Venous 7.15 (LL) 7.32 - 7.43    pCO2 Venous 94 (HH) 40 - 50 mm Hg    pO2 Venous 55 (H) 25 - 47 mm Hg    Bicarbonate Venous 33 (H) 21 - 28 mmol/L    Base Excess/Deficit Venous 3.0 -3.0 - 3.0 mmol/L    FIO2 45     Oxyhemoglobin Venous 86 (H) 70 - 75 %    O2 Sat, Venous 88.3 (H) 70.0 - 75.0 %    Narrative    In healthy individuals, oxyhemoglobin (O2Hb) and oxygen saturation (SO2) are approximately equal. In the presence of dyshemoglobins, oxyhemoglobin can be considerably lower than oxygen saturation.   CBC with Platelets & Differential    Narrative    The following orders were created for panel order CBC with Platelets & Differential.  Procedure                               Abnormality         Status                     ---------                               -----------         ------                     CBC with platelets and d...[[id number]]  Abnormal            Final result               Manual Differential[[id number]]          Abnormal            Final result                 Please view results for these tests on the individual orders.   Magnesium   Result Value Ref Range    Magnesium 1.5 (L) 1.7 - 2.3 mg/dL   Comprehensive metabolic panel   Result Value Ref Range    Sodium 130 (L) 135 - 145 mmol/L    Potassium 3.6 3.4 - 5.3 mmol/L    Carbon Dioxide (CO2) 31 (H) 22 - 29 mmol/L    Anion Gap 5 (L) 7 - 15 mmol/L    Urea Nitrogen 17.4 8.0 - 23.0 mg/dL    Creatinine 1.81 (H) 0.51 - 0.95 mg/dL    GFR Estimate 31 (L) >60 mL/min/1.73m2    Calcium 8.3 (L) 8.8 - 10.2 mg/dL    Chloride 94 (L) 98 - 107 mmol/L    Glucose 131 (H) 70 - 99 mg/dL    Alkaline Phosphatase 62 40 - 150 U/L    AST 13 0 - 45 U/L    ALT <5 0 - 50 U/L    Protein Total 5.5 (L) 6.4 - 8.3 g/dL    Albumin 3.1 (L) 3.5 - 5.2 g/dL    Bilirubin Total 0.2 <=1.2 mg/dL   Phosphorus   Result Value Ref Range     Phosphorus 2.3 (L) 2.5 - 4.5 mg/dL   Ionized Calcium   Result Value Ref Range    Calcium Ionized Whole Blood 5.0 4.4 - 5.2 mg/dL   Lactic acid whole blood   Result Value Ref Range    Lactic Acid 0.3 (L) 0.7 - 2.0 mmol/L   INR   Result Value Ref Range    INR 0.90 0.85 - 1.15   Procalcitonin   Result Value Ref Range    Procalcitonin 0.50 (H) <0.50 ng/mL   CBC with platelets and differential   Result Value Ref Range    WBC Count 4.2 4.0 - 11.0 10e3/uL    RBC Count 2.14 (L) 3.80 - 5.20 10e6/uL    Hemoglobin 7.8 (L) 11.7 - 15.7 g/dL    Hematocrit 25.9 (L) 35.0 - 47.0 %     (H) 78 - 100 fL    MCH 36.4 (H) 26.5 - 33.0 pg    MCHC 30.1 (L) 31.5 - 36.5 g/dL    RDW 14.7 10.0 - 15.0 %    Platelet Count 145 (L) 150 - 450 10e3/uL    % Neutrophils      % Lymphocytes      % Monocytes      % Eosinophils      % Basophils      % Immature Granulocytes      NRBCs per 100 WBC 1 (H) <1 /100    Absolute Neutrophils      Absolute Lymphocytes      Absolute Monocytes      Absolute Eosinophils      Absolute Basophils      Absolute Immature Granulocytes      Absolute NRBCs 0.0 10e3/uL   Manual Differential   Result Value Ref Range    % Neutrophils 76 %    % Lymphocytes 10 %    % Monocytes 14 %    % Eosinophils 0 %    % Basophils 0 %    NRBCs per 100 WBC 2 (H) <=0 %    Absolute Neutrophils 3.2 1.6 - 8.3 10e3/uL    Absolute Lymphocytes 0.4 (L) 0.8 - 5.3 10e3/uL    Absolute Monocytes 0.6 0.0 - 1.3 10e3/uL    Absolute Eosinophils 0.0 0.0 - 0.7 10e3/uL    Absolute Basophils 0.0 0.0 - 0.2 10e3/uL    Absolute NRBCs 0.1 (H) <=0.0 10e3/uL    RBC Morphology Confirmed RBC Indices     Platelet Assessment  Automated Count Confirmed. Platelet morphology is normal.     Automated Count Confirmed. Platelet morphology is normal.    Polychromasia Slight (A) None Seen    Stomatocytes Moderate (A) None Seen   TSH with free T4 reflex   Result Value Ref Range    TSH 6.50 (H) 0.30 - 4.20 uIU/mL   Ketone Beta-Hydroxybutyrate Quantitative   Result Value Ref Range     Ketone (Beta-Hydroxybutyrate) Quantitative <0.18 <=0.30 mmol/L   T4 free   Result Value Ref Range    Free T4 0.64 (L) 0.90 - 1.70 ng/dL   Blood gas venous   Result Value Ref Range    pH Venous 7.09 (LL) 7.32 - 7.43    pCO2 Venous 106 (HH) 40 - 50 mm Hg    pO2 Venous 63 (H) 25 - 47 mm Hg    Bicarbonate Venous 32 (H) 21 - 28 mmol/L    Base Excess/Deficit Venous 1.4 -3.0 - 3.0 mmol/L    FIO2 45     Oxyhemoglobin Venous 87 (H) 70 - 75 %    O2 Sat, Venous 90.3 (H) 70.0 - 75.0 %    Narrative    In healthy individuals, oxyhemoglobin (O2Hb) and oxygen saturation (SO2) are approximately equal. In the presence of dyshemoglobins, oxyhemoglobin can be considerably lower than oxygen saturation.   Glucose by meter   Result Value Ref Range    GLUCOSE BY METER POCT 128 (H) 70 - 99 mg/dL   XR Chest Port 1 View    Narrative    EXAM: XR CHEST PORT 1 VIEW 2/17/2024 9:05 PM     HISTORY: Endotracheal tube positioning       COMPARISON: 2/17/2024     FINDINGS: Endotracheal tube tip is 5 cm above the nabil. Right IJ  central venous catheter tip projects over the right atrium. Post  surgical changes of bilateral lung transplant. Intact clam shell  sternotomy wires. Stable moderate bilateral pleural effusions.  Unchanged mild patchy interstitial and airspace opacities. Splaying of  the nabil suggestive of left atrial enlargement.       Impression    IMPRESSION:  1.  New endotracheal tube is in good position.  2.  Otherwise unchanged chest with persistent pleural effusions with  mixed interstitial and airspace opacities.    I have personally reviewed the examination and initial interpretation  and I agree with the findings.    ISIDRO NOVAK MD         SYSTEM ID:  [id number]   Respiratory Aerobic Bacterial Culture with Gram Stain    Specimen: Endotracheal; Sputum   Result Value Ref Range    Culture No growth, less than 1 day     Gram Stain Result <25 PMNs/low power field     Gram Stain Result No organisms seen    Blood gas arterial    Result Value Ref Range    pH Arterial 7.25 (L) 7.35 - 7.45    pCO2 Arterial 78 (HH) 35 - 45 mm Hg    pO2 Arterial 34 (LL) 80 - 105 mm Hg    FIO2 30     Bicarbonate Arterial 34 (H) 21 - 28 mmol/L    Base Excess/Deficit Arterial 5.8 (H) -3.0 - 3.0 mmol/L    Fabricio's Test Yes     Oxyhemoglobin Arterial 69 (L) 92 - 100 %    O2 Sat, Arterial 71.1 (L) 96.0 - 97.0 %    Peep 8 cm H2O    Narrative    In healthy individuals, oxyhemoglobin (O2Hb) and oxygen saturation (SO2) are approximately equal. In the presence of dyshemoglobins, oxyhemoglobin can be considerably lower than oxygen saturation.   Blood gas venous   Result Value Ref Range    pH Venous 7.31 (L) 7.32 - 7.43    pCO2 Venous 70 (H) 40 - 50 mm Hg    pO2 Venous 14 (L) 25 - 47 mm Hg    Bicarbonate Venous 35 (H) 21 - 28 mmol/L    Base Excess/Deficit Venous 7.5 (H) -3.0 - 3.0 mmol/L    FIO2 30     Oxyhemoglobin Venous 24 (L) 70 - 75 %    O2 Sat, Venous 24.4 (L) 70.0 - 75.0 %    Narrative    In healthy individuals, oxyhemoglobin (O2Hb) and oxygen saturation (SO2) are approximately equal. In the presence of dyshemoglobins, oxyhemoglobin can be considerably lower than oxygen saturation.   MRSA MSSA PCR, Nasal Swab    Specimen: Nares, Bilateral; Swab   Result Value Ref Range    MRSA Target DNA Negative Negative    SA Target DNA Negative     Narrative    The [employer]  Xpert SA Nasal Complete assay performed in the [employer]  Dx System is a qualitative in vitro diagnostic test designed for rapid detection of Staphylococcus aureus (SA) and methicillin-resistant Staphylococcus aureus (MRSA) from nasal swabs in patients at risk for nasal colonization. The test utilizes automated real-time polymerase chain reaction (PCR) to detect MRSA/SA DNA. The Xpert SA Nasal Complete assay is intended to aid in the prevention and control of MRSA/SA infections in healthcare settings. The assay is not intended to diagnose, guide or monitor treatment for MRSA/SA infections, or provide results of  susceptibility to methicillin. A negative result does not preclude MRSA/SA nasal colonization.    Glucose by meter   Result Value Ref Range    GLUCOSE BY METER POCT 68 (L) 70 - 99 mg/dL   CT Chest w/o Contrast    Narrative    EXAM: CT CHEST W/O CONTRAST 2/18/2024 12:31 AM    HISTORY: 63 years Female Acute on chronic respiratory failure in a pt  with hx of bilateral lung transplant and worsening opacities on CXR.  Evaluate opacities.    COMPARISON: Chest CT 1/10/2024, CT chest, abdomen and pelvis dated  2/7/2024.    TECHNIQUE: Helical CT imaging of the chest was obtained without  contrast. Multiplanar post-processed and MIP reformats were obtained.  Images reviewed in soft tissue, bone, and lung windows. Contrast:  None.     FINDINGS:    LINES/TUBES: Right chest Port-A-Cath with tip terminating the right  atrium. Endotracheal tube terminates in the midthoracic trachea.    LOWER NECK: Thyroid unremarkable.    LUNG: Small bilateral pleural effusions with adjacent compressive  atelectasis. Multifocal patchy consolidative opacities throughout both  lungs. Groundglass attenuation, most prominent in the dependent  portions of the lungs. No pneumothorax. Intralobular septal  thickening. Postoperative changes of bilateral lung transplantation.    AIRWAYS: Heterogeneous debris within the low thoracic trachea  extending into the right mainstem bronchus.     VASCULAR: Stable mild enlargement of pulmonary trunk. Mild aortic  atherosclerosis.    CARDIAC: Cardiomegaly. Trace pericardial effusion. Mild coronary  artery atherosclerosis. Anemic blood pool.     MEDIASTINUM/JENNIFER: No overt lymphadenopathy, although evaluation is  limited by extensive soft tissue edema in the mediastinum.    CHEST WALL: Unremarkable.    ESOPHAGUS: Unremarkable.    UPPER ABDOMEN: Limited evaluation due to lack of contrast. 3 mm  calculus in the left upper renal pole. Trace ascites in the upper  abdomen.    MUSCULOSKELETAL: Degenerative changes in keeping  with the patient's  age. No acute osseous abnormality. No suspicious osseous lesion.  Clamshell sternotomy wires are intact.    SOFT TISSUES: Anasarca.      Impression    IMPRESSION:   1.  Patchy multifocal consolidative opacities with adjacent  groundglass attenuation throughout the lungs, new/increased since  2/7/2024 CT, concerning for acute infection/inflammatory process with  likely superimposed pulmonary edema.   2.  Small bilateral pleural effusions.  3.  Small volume of frothy debris within the distal trachea and right  mainstem bronchus.  4.  Stable cardiomegaly.    I have personally reviewed the examination and initial interpretation  and I agree with the findings.    ISIDRO NOVAK MD         SYSTEM ID:  [id number]   CT Head w/o Contrast    Narrative    EXAM: CT HEAD W/O CONTRAST  2/18/2024 12:31 AM     HISTORY: AMS, recent fall with head trauma, rule out new structural  intracranial anomaly       COMPARISON: Head CT 9/20/2022    TECHNIQUE: Using multidetector thin collimation helical acquisition  technique, axial, coronal and sagittal CT images from the skull base  to the vertex were obtained without intravenous contrast.   (topogram) image(s) also obtained and reviewed.    FINDINGS:  No acute intracranial hemorrhage, mass effect, or midline shift. No  acute loss of gray-white matter differentiation. Ventricles are  proportionate to the cerebral sulci. Clear basal cisterns. Patchy  white matter hypoattenuation, most likely represents chronic small  vessel ischemic disease. Atraumatic calvarium. Clear paranasal  sinuses, mastoid air cells. Right periorbital/upper eyelid  hyperdensity, most likely represents hematoma in this patient with  history of fall, measuring 1.5 x 0.9 cm.      Impression    IMPRESSION: No acute intracranial pathology.     I have personally reviewed the examination and initial interpretation  and I agree with the findings.    NIC BADILLO MD         SYSTEM ID:  [id number]    Glucose by meter   Result Value Ref Range    GLUCOSE BY METER POCT 115 (H) 70 - 99 mg/dL   Blood gas venous   Result Value Ref Range    pH Venous 7.57 (H) 7.32 - 7.43    pCO2 Venous 34 (L) 40 - 50 mm Hg    pO2 Venous 29 25 - 47 mm Hg    Bicarbonate Venous 31 (H) 21 - 28 mmol/L    Base Excess/Deficit Venous 8.5 (H) -3.0 - 3.0 mmol/L    FIO2 30     Oxyhemoglobin Venous 79 (H) 70 - 75 %    O2 Sat, Venous 81.9 (H) 70.0 - 75.0 %    Potassium Whole Blood 2.8 (L) 3.4 - 5.3 mmol/L    Lactic Acid 2.5 (H) 0.7 - 2.0 mmol/L    Narrative    In healthy individuals, oxyhemoglobin (O2Hb) and oxygen saturation (SO2) are approximately equal. In the presence of dyshemoglobins, oxyhemoglobin can be considerably lower than oxygen saturation.   Glucose by meter   Result Value Ref Range    GLUCOSE BY METER POCT 99 70 - 99 mg/dL   UA with Microscopic reflex to Culture    Specimen: Urine, Catheter   Result Value Ref Range    Color Urine Yellow Colorless, Straw, Light Yellow, Yellow    Appearance Urine Slightly Cloudy (A) Clear    Glucose Urine 150 (A) Negative mg/dL    Bilirubin Urine Negative Negative    Ketones Urine Negative Negative mg/dL    Specific Gravity Urine 1.023 1.003 - 1.035    Blood Urine Negative Negative    pH Urine 7.5 (H) 5.0 - 7.0    Protein Albumin Urine 600 (A) Negative mg/dL    Urobilinogen Urine Normal Normal, 2.0 mg/dL    Nitrite Urine Negative Negative    Leukocyte Esterase Urine Trace (A) Negative    WBC Clumps Urine Present (A) None Seen /HPF    Mucus Urine Present (A) None Seen /LPF    Amorphous Crystals Urine Moderate (A) None Seen /HPF    RBC Urine 12 (H) <=2 /HPF    WBC Urine 66 (H) <=5 /HPF    Squamous Epithelials Urine <1 <=1 /HPF    Transitional Epithelials Urine 1 <=1 /HPF    Narrative    Urine Culture ordered based on laboratory criteria   Glucose by meter   Result Value Ref Range    GLUCOSE BY METER POCT 92 70 - 99 mg/dL   Magnesium   Result Value Ref Range    Magnesium 2.0 1.7 - 2.3 mg/dL   Phosphorus    Result Value Ref Range    Phosphorus 1.1 (L) 2.5 - 4.5 mg/dL   Comprehensive metabolic panel   Result Value Ref Range    Sodium 131 (L) 135 - 145 mmol/L    Potassium 3.8 3.4 - 5.3 mmol/L    Carbon Dioxide (CO2) 30 (H) 22 - 29 mmol/L    Anion Gap 6 (L) 7 - 15 mmol/L    Urea Nitrogen 21.8 8.0 - 23.0 mg/dL    Creatinine 2.17 (H) 0.51 - 0.95 mg/dL    GFR Estimate 25 (L) >60 mL/min/1.73m2    Calcium 8.0 (L) 8.8 - 10.2 mg/dL    Chloride 95 (L) 98 - 107 mmol/L    Glucose 106 (H) 70 - 99 mg/dL    Alkaline Phosphatase 51 40 - 150 U/L    AST 15 0 - 45 U/L    ALT <5 0 - 50 U/L    Protein Total 4.5 (L) 6.4 - 8.3 g/dL    Albumin 2.6 (L) 3.5 - 5.2 g/dL    Bilirubin Total 0.2 <=1.2 mg/dL   Tacrolimus by Tandem Mass Spectrometry   Result Value Ref Range    Tacrolimus by Tandem Mass Spectrometry 4.8 (L) 5.0 - 15.0 ug/L    Tacrolimus Last Dose Date      Tacrolimus Last Dose Time      Narrative    This test was developed and its performance characteristics determined by the [facility],  Special Chemistry Laboratory. It has not been cleared or approved by the FDA. The laboratory is regulated under CLIA as qualified to perform high-complexity testing. This test is used for clinical purposes. It should not be regarded as investigational or for research.   Blood gas venous   Result Value Ref Range    pH Venous 7.39 7.32 - 7.43    pCO2 Venous 52 (H) 40 - 50 mm Hg    pO2 Venous 38 25 - 47 mm Hg    Bicarbonate Venous 32 (H) 21 - 28 mmol/L    Base Excess/Deficit Venous 6.2 (H) -3.0 - 3.0 mmol/L    FIO2 30     Oxyhemoglobin Venous 77 (H) 70 - 75 %    O2 Sat, Venous 78.6 (H) 70.0 - 75.0 %    Narrative    In healthy individuals, oxyhemoglobin (O2Hb) and oxygen saturation (SO2) are approximately equal. In the presence of dyshemoglobins, oxyhemoglobin can be considerably lower than oxygen saturation.   CRP inflammation   Result Value Ref Range    CRP Inflammation 30.83 (H) <5.00 mg/L   Lactic acid whole blood   Result  Value Ref Range    Lactic Acid 1.1 0.7 - 2.0 mmol/L   Ionized Calcium   Result Value Ref Range    Calcium Ionized Whole Blood 4.7 4.4 - 5.2 mg/dL   Glucose by meter   Result Value Ref Range    GLUCOSE BY METER POCT 118 (H) 70 - 99 mg/dL   CBC with Platelets & Differential    Narrative    The following orders were created for panel order CBC with Platelets & Differential.  Procedure                               Abnormality         Status                     ---------                               -----------         ------                     CBC with platelets and d...[[id number]]  Abnormal            Final result               Manual Differential[[id number]]          Abnormal            Final result                 Please view results for these tests on the individual orders.   Cryptococcus antigen    Specimen: Line, venous; Blood   Result Value Ref Range    Cryptococcal Antigen Negative Negative   CBC with platelets and differential   Result Value Ref Range    WBC Count 3.0 (L) 4.0 - 11.0 10e3/uL    RBC Count 1.72 (L) 3.80 - 5.20 10e6/uL    Hemoglobin 6.3 (LL) 11.7 - 15.7 g/dL    Hematocrit 20.3 (L) 35.0 - 47.0 %     (H) 78 - 100 fL    MCH 36.6 (H) 26.5 - 33.0 pg    MCHC 31.0 (L) 31.5 - 36.5 g/dL    RDW 14.8 10.0 - 15.0 %    Platelet Count 138 (L) 150 - 450 10e3/uL    % Neutrophils      % Lymphocytes      % Monocytes      % Eosinophils      % Basophils      % Immature Granulocytes      NRBCs per 100 WBC 0 <1 /100    Absolute Neutrophils      Absolute Lymphocytes      Absolute Monocytes      Absolute Eosinophils      Absolute Basophils      Absolute Immature Granulocytes      Absolute NRBCs 0.0 10e3/uL   Manual Differential   Result Value Ref Range    % Neutrophils 66 %    % Lymphocytes 19 %    % Monocytes 10 %    % Eosinophils 4 %    % Basophils 0 %    % Metamyelocytes 1 %    NRBCs per 100 WBC 1 (H) <=0 %    Absolute Neutrophils 2.0 1.6 - 8.3 10e3/uL    Absolute Lymphocytes 0.6 (L) 0.8 - 5.3 10e3/uL     Absolute Monocytes 0.3 0.0 - 1.3 10e3/uL    Absolute Eosinophils 0.1 0.0 - 0.7 10e3/uL    Absolute Basophils 0.0 0.0 - 0.2 10e3/uL    Absolute Metamyelocytes 0.0 <=0.0 10e3/uL    Absolute NRBCs 0.0 <=0.0 10e3/uL    RBC Morphology Confirmed RBC Indices     Platelet Assessment  Automated Count Confirmed. Platelet morphology is normal.     Automated Count Confirmed. Platelet morphology is normal.    Polychromasia Slight (A) None Seen    Stomatocytes Moderate (A) None Seen   Prepare red blood cells (unit)   Result Value Ref Range    Blood Component Type Red Blood Cells     Product Code M0254D18     Unit Status Transfused     Unit Number [id number]     CROSSMATCH COMPATIBLE     CODING SYSTEM [id number]     ISSUE DATE AND TIME [id number]     UNIT ABO/RH O+     UNIT TYPE ISBT 5100    ABO/Rh type and screen *Canceled*    Narrative    The following orders were created for panel order ABO/Rh type and screen.  Procedure                               Abnormality         Status                     ---------                               -----------         ------                       Please view results for these tests on the individual orders.   Extra Tube    Narrative    The following orders were created for panel order Extra Tube.  Procedure                               Abnormality         Status                     ---------                               -----------         ------                     Extra Purple Top Tube[[id number]]                            Final result                 Please view results for these tests on the individual orders.   Extra Purple Top Tube   Result Value Ref Range    Hold Specimen [facility]    US Upper Extremity Venous Duplex Left    Narrative    EXAMINATION: US UPPER EXTREMITY VENOUS DUPLEX LEFT  2/18/2024 10:01 AM       CLINICAL HISTORY: unilateral swelling, rule out DVT    COMPARISON: Ultrasound 7/8/2022        PROCEDURE COMMENTS: Ultrasound was performed of the deep venous system  of the  left upper extremity using grayscale, color, and spectral  Doppler.    FINDINGS:  The internal jugular, brachiocephalic, subclavian, brachial, basilic  and cephalic veins are visualized and are patent. Venous waveforms are  normal with arterial type waveforms noted in the visualized  hemodialysis fistula. There is normal response to compression. Edema  noted in the superficial subcutaneous soft tissues.      Impression    IMPRESSION:  No deep venous thrombosis in the left upper extremity.    I have personally reviewed the examination and initial interpretation  and I agree with the findings.    ISIDRO NOVAK MD         SYSTEM ID:  [id number]   ABO/Rh type and screen    Narrative    The following orders were created for panel order ABO/Rh type and screen.  Procedure                               Abnormality         Status                     ---------                               -----------         ------                     Adult Type and Screen[[id number]]                            Final result                 Please view results for these tests on the individual orders.   Adult Type and Screen   Result Value Ref Range    ABO/RH(D) O POS     Antibody Screen Negative Negative    SPECIMEN EXPIRATION DATE [id number]    Echo Complete   Result Value Ref Range    LVEF  55-60%     Narrative    [id number]  [id number]  [id number]  [id number]^ANUJA^JUAN^MAC     [facility],[place]  Echocardiography Laboratory  [address] [place] [id number]     Name: ALMAS CASIANO  MRN: [id number]  : 1960  Study Date: 2024 09:22 AM  Age: 63 yrs  Gender: Female  Patient Location: [facility]  Reason For Study: CHF  Ordering Physician: JUAN LICEA  Referring Physician: MICHELE SOSA  Performed By: Kateryna Concepcion     BSA: 1.4 m2  Height: 61 in  Weight: 100 lb  BP: 128/75  mmHg  ______________________________________________________________________________  Procedure  Complete Portable Echo Adult.  ______________________________________________________________________________  Interpretation Summary  Global and regional left ventricular function is normal with an EF of 55-60%.  Global right ventricular function is normal. The right ventricle is normal  size.  No significant valvular abnormalities present.  IVC diameter <2.1 cm collapsing >50% with sniff suggests a normal RA pressure  of 3 mmHg.  This study was compared with the study from 02/28/2023. No significant changes  noted.  ______________________________________________________________________________  Left Ventricle  Global and regional left ventricular function is normal with an EF of 55-60%.  Left ventricular size is normal. Left ventricular wall thickness is normal.  Left ventricular diastolic function is indeterminate.     Right Ventricle  Global right ventricular function is normal. The right ventricle is normal  size.     Atria  Severe left atrial enlargement is present. Severe right atrial enlargement is  present.     Mitral Valve  Mild mitral annular calcification is present. Mild mitral insufficiency is  present.     Aortic Valve  The aortic valve is tricuspid. On Doppler interrogation, there is no  significant stenosis or regurgitation.     Tricuspid Valve  The valve leaflets are not well visualized. Trace tricuspid insufficiency is  present. The right ventricular systolic pressure is approximated at 9.1 mmHg  plus the right atrial pressure. The RVSP is probably underestimated.     Pulmonic Valve  The valve leaflets are not well visualized. Trace pulmonic insufficiency is  present.     Vessels  The aorta root is normal. The thoracic aorta is normal. IVC diameter <2.1 cm  collapsing >50% with sniff suggests a normal RA pressure of 3 mmHg.     Pericardium  No pericardial effusion is present.     Miscellaneous  No  significant valvular abnormalities present.     Compared to Previous Study  This study was compared with the study from 2023 . No significant  changes noted.  ______________________________________________________________________________  MMode/2D Measurements & Calculations  IVSd: 0.97 cm  LVIDd: 4.5 cm  LVIDs: 2.6 cm  LVPWd: 0.80 cm  FS: 42.6 %  LV mass(C)d: 130.8 grams  LV mass(C)dI: 92.9 grams/m2  Ao root diam: 3.3 cm  asc Aorta Diam: 3.4 cm  LVOT diam: 2.1 cm  LVOT area: 3.5 cm2  Ao root diam index Ht(cm/m): 2.1  Ao root diam index BSA (cm/m2): 2.3  Asc Ao diam index BSA (cm/m2): 2.4  Asc Ao diam index Ht(cm/m): 2.2  LA Volume (BP): 115.0 ml     LA Volume Index (BP): 81.6 ml/m2  RWT: 0.35  TAPSE: 1.7 cm     Doppler Measurements & Calculations  MV E max julito: 73.5 cm/sec  MV A max julito: 55.7 cm/sec  MV E/A: 1.3  MV dec slope: 240.0 cm/sec2  MV dec time: 0.25 sec  Ao V2 max: 151.0 cm/sec  Ao max P.1 mmHg  Ao V2 mean: 102.6 cm/sec  Ao mean P.5 mmHg  Ao V2 VTI: 31.2 cm  KELLIE(I,D): 2.2 cm2  KELLIE(V,D): 2.1 cm2  LV V1 max PG: 3.4 mmHg  LV V1 max: 91.7 cm/sec  LV V1 VTI: 19.5 cm  SV(LVOT): 67.4 ml  SI(LVOT): 47.9 ml/m2  TR max julito: 151.0 cm/sec  TR max P.1 mmHg     AV Julito Ratio (DI): 0.61  KELLIE Index (cm2/m2): 1.5  E/E' av.2  Lateral E/e': 8.3  Medial E/e': 14.0  RV S Julito: 7.8 cm/sec     ______________________________________________________________________________  Report approved by: Mary Donald 2024 10:59 AM         Cell count with differential fluid - BAL Site 1    Narrative    The following orders were created for panel order Cell count with differential fluid - BAL Site 1.  Procedure                               Abnormality         Status                     ---------                               -----------         ------                     Cell Count Body Fluid[[id number]]                            In process                 Differential Body Fluid[[id number]]                           In process                   Please view results for these tests on the individual orders.   Acid-Fast Bacilli Culture and Stain    Specimen: Bronchus; Bronchial Alveolar Lavage    Narrative    The following orders were created for panel order Acid-Fast Bacilli Culture and Stain.  Procedure                               Abnormality         Status                     ---------                               -----------         ------                     Acid-Fast Bacilli Cultur...[[id number]]                      In process                   Please view results for these tests on the individual orders.     *Note: Due to a large number of results and/or encounters for the requested time period, some results have not been displayed. A complete set of results can be found in Results Review.

## 2024-02-18 NOTE — PROGRESS NOTES
"  Nephrology Progress Note  02/18/2024         Assessment & Recommendations:   Sofie Rodriguez is a 63 year old year old female    62yo F with ESKD, lung transplant in 6/28/22, and gastroparesis admitted with failure to thrive and unintentional weight loss. She also reports LE edema and L arm edema at the elbow. She reports feeling \"sick\" whenever she has tube feeds or oral food intake. She has nausea but no vomiting. Neph is consulted for HD.     # ESKD - TTS, LUE AVG, 3hr, 45.5kg, [facility], Dr. Andres Fairbanks. She has been losing weight and now even below her recent set EDW.  - volume overload on CT chest scan so UF run tomorrow 2/19/2024  # Edema due to third spacing due to hypoalbuminemia  - Please wrap and elevate her legs  # FTT  # Chronic diarrhea  Working-up currently. Not on any cellcept.   - Please limit fluid < 1.5 L per day for TPN  - Now on TPN since 2/13/24  - PLEASE chart volume of TPN in the I/O  #Hypertension - anuric so stopped furosemide   On metoprolol solution 25 mg twice a day  # Anemia 2/2 ESKD  On Venofer 50 qwk, Mircera last dose 1/9/2024  - hemoglobin 7.5 and not at goal  - Will continue Venofer 50 mcg q week (Tuesday)  - Check CBC once a week on Tuesday  - Started Epogen 4000 U with run while she is admitted; 1st dose 2/13/24  - If Hb not improved then dose needs to be increased 2/20/2024  # BMD   Phos low, discontinue sevelamer    Recommendations were communicated to primary team via  note     Claribel Horn MD   Division of Renal Disease and Hypertension  [employer]  Vocera Web Console    Interval History :   Nursing and provider notes from last 24 hours reviewed.  In the last 24 hours Sofie Rodriguez had HD but had significant decline in mental status, ABG showed hypercapnea, went to ICU and intubated. Intubated and sedated and s/p bronch at time of my assessment. Started on broad spectrum antimicrobials for pneumonia. CT chest showed infection and pulmonary edema.    Review of " "Systems:   Not obtainable due to intubated sedated status    Physical Exam:   I/O last 3 completed shifts:  In: 1200.77 [I.V.:639.77; NG/GT:240]  Out: 2010 [Urine:10; Other:2000]   BP (!) 147/80   Pulse 73   Temp 97.6  F (36.4  C) (Oral)   Resp 18   Ht 1.57 m (5' 1.81\")   Wt 45.7 kg (100 lb 12 oz)   SpO2 100%   BMI 18.54 kg/m       GENERAL APPEARANCE: intubated, sedated  PULM: ET tube, mechanically ventilated, lungs clear to auscultation bilaterally, equal air movement  CV: regular rhythm, normal rate, no rub     -1+ edema   GI: soft, non tender, no distended, bowel sounds are present  INTEGUMENT: no cyanosis, no rash  NEURO:  sedated  Access Left AVG: + Bruit and thrill    Labs:   All labs reviewed by me  Electrolytes/Renal -   Recent Labs   Lab Test 02/18/24  1422 02/18/24  1418 02/18/24  0828 02/18/24  0513 02/18/24  0202 02/18/24  0201 02/17/24  1953 02/17/24  1830 02/17/24  1806 02/17/24  1243   NA  --   --   --  131*  --   --   --  130*  --  130*   POTASSIUM  --  4.0  --  3.8  --  2.8*  --  3.6  --  3.9   CHLORIDE  --   --   --  95*  --   --   --  94*  --  94*   CO2  --   --   --  30*  --   --   --  31*  --  25   BUN  --   --   --  21.8  --   --   --  17.4  --  31.7*   CR  --   --   --  2.17*  --   --   --  1.81*  --  2.86*   *  --  118* 106*   < >  --    < > 131*   < > 233*   ESTUARDO  --   --   --  8.0*  --   --   --  8.3*  --  8.5*   MAG  --  2.0  --  2.0  --   --   --  1.5*  --  1.8   PHOS  --  2.2*  --  1.1*  --   --   --  2.3*  --  3.6    < > = values in this interval not displayed.       CBC -   Recent Labs   Lab Test 02/18/24  1418 02/18/24  0842 02/17/24  1830 02/16/24  0703 02/15/24  0620 02/14/24  0807   WBC  --  3.0* 4.2  --   --  4.5   HGB 7.5* 6.3* 7.8*  --    < > 8.2*   PLT  --  138* 145* 183  --  175    < > = values in this interval not displayed.       LFTs -   Recent Labs   Lab Test 02/18/24  0513 02/17/24  1830 02/17/24  1243   ALKPHOS 51 62 79   BILITOTAL 0.2 0.2 0.2   ALT <5 <5 <5 "   AST 15 13 15   PROTTOTAL 4.5* 5.5* 5.7*   ALBUMIN 2.6* 3.1* 3.1*       Iron Panel -   Recent Labs   Lab Test 09/26/22  0555 09/03/22  1039 08/24/22  0810   IRON 54 21* 41   IRONSAT 22 9* 21   CARLOS 769* 343* 334*         Imaging:  All imaging studies reviewed by me.     Current Medications:   acetaminophen  1,000 mg Per J Tube TID    calcium carbonate-vitamin D  1 tablet Per J Tube TID w/meals    chlorhexidine  15 mL Mouth/Throat Q12H    cyanocobalamin  500 mcg Per Feeding Tube Daily    dapsone  50 mg Per J Tube Q Mon Wed Fri AM    heparin ANTICOAGULANT  5,000 Units Subcutaneous Q12H    lipids plant base  250 mL Intravenous Once per day on Monday Wednesday Friday    [Held by provider] metoprolol  25 mg Per J Tube BID    micafungin  100 mg Intravenous Q24H    multivitamin w/minerals  1 tablet Oral or Feeding Tube Daily    pantoprazole  40 mg Per J Tube BID    piperacillin-tazobactam  2.25 g Intravenous Q6H    predniSONE  5 mg Per J Tube QAM    And    predniSONE  2.5 mg Per J Tube QPM    [Held by provider] sevelamer carbonate (RENVELA)  0.8 g Oral BID    tacrolimus  4 mg Per J Tube QAM    And    tacrolimus  4.5 mg Per J Tube QPM    vancomycin place blake - receiving intermittent dosing  1 each Intravenous See Admin Instructions      dextrose      dextrose      fentaNYL 50 mcg/hr (02/18/24 1400)    norepinephrine Stopped (02/17/24 2112)    parenteral nutrition - ADULT compounded formula      parenteral nutrition - ADULT compounded formula Stopped (02/18/24 1149)    propofol 30 mcg/kg/min (02/18/24 1400)    sodium chloride 0.9%      - MEDICATION INSTRUCTIONS -       Claribel Horn MD

## 2024-02-18 NOTE — PROGRESS NOTES
Responded to RRT 2/2 severe acidosis on VBG. Pt on 4l NC O2 sats 94%. Appears stable. Pt transferred to ICU and placed on Bipap per MD order. See flowsheet. Tolerating well.

## 2024-02-18 NOTE — PROCEDURES
Procedure:   Bronchoscopy with BAL        Indication:   AHRF with hypercarbia, encephalopathy. Lung transplant 2022      Consent:   Obtained from the patient/family. Omitted due to medical emergency.       Pre-medication:   Propofol, Fentanyl        Procedure Summary:   Time out was performed.   The scope inserted thru the ETT.  Exam of trachea and bronchus of the right and left bronchial tree to the sub-segmental level revealed no endobronchial lesion. Transplant anastamosis intact without signs of compromise. Friable mucosa with minor suction trauma with any negative pressure. No lesions noted. No notable mucus plugs noted. BAL performed in the middle lobe. A total of 60cc saline instilled   and 35cc colored fluid recovered.  The patient tolerated the procedure well without undue discomfort, hypotension or arrhythmia. The procedure was performed in the ICU--and vital sign parameters were monitored.  Start time 1035 End Time 1050      Complications:   No immediate complications.  Sample sent for cell count, cytology and microbiology.    This procedure is performed by Jcarlos Weinstein MD    Female

## 2024-02-18 NOTE — H&P
[facility] ADULT ICU H&P  02/17/2024    Date of Hospital Admission: 2/10/24  Date of ICU Admission: 2/17/24  Reason for Critical Care Admission: Severe respiratory acidosis requiring intubation and mechanical ventilation   Date of Service (when I saw the patient): 02/17/2024    ASSESSMENT: Sofie Rodriguez is a 63 year old female with PMH notable for bilateral lung transplant in 2022 for COPD, ESRD on HD (T/Th/S), gastroparesis s/p PEG/J, severe malnutrition, recent R femoral fracture s/p ORIF in [place]. Admitted 2/10/24 to [facility] for FTT and small bowel dysmotility for port-a-cath placement and TPN initiation. Transferred to ICU evening of 2/17/24 for acute on chronic hypoxic and hypercarbic respiratory failure with severe hypercarbia and AMS requiring intubation, ddx includes pulmonary edema vs infection, possible mixed picture.      PLAN:    Neuro:  # AMS 2/2 acute on chronic hypercarbia  # Sedation for mechanical ventilation  Noted to be more lethargic AM of 2/17/24. Given narcan with favorable response after receiving oxycodone 5mg around 0500. Continued lethargy throughout the day. Neuro exam nonfocal, but notable for progressive somnolence. Further work-up notable for significant respiratory acidosis VBG 7.15/98/42/34. T4 also slightly low 0.64. BUN WNL. LFTs and ammonia normal. . WBCs normal, no fevers but immunosuppressed. Placed on BiPAP, but VBG continued to worsen 7.09/106/63/32 and pt was subsequently intubated.   Non-con head CT - no acute intracranial anomalies   Metabolic work-up notable for CO2 106, Na+ 130, T4 free 0.64  Sedation: transition from versed gtt to propofol gtt + boluses  Analgesia: Fentanyl gtt, holding oxycodone  No PTA psych meds     Pulmonary:  # Acute on chronic hypoxic and hypercarbic respiratory failure, pulm edema vs infection  # R hemidiaphragm palsy  # Hx of bilateral lung transplant 6/8/22 for COPD  # Recurrent PNAs   # Positive DSA   # Suspected CARLEE  # PTA nocturnal  O2, 2L   Underwent bilateral lung transplant 6/28/22 for COPD. Has recently been struggling with malnutrition 2/2 tube feeding intolerance and recurrent hospitalizations for FTT and falls. Chest CT 2/7 notable for patchy LLL opacity, septal thickening c/f interstitial edema, small pelural effusions. Initially on RA on admission, required O2 starting 2/14, primary team felt fluid overload driving clinical picture. On 2/17 pt notably lethargic, a VBG  showed respiratory acidosis 7.15/98/42/34 prompting transfer to ICU for BiPAP. Historic VBGs shows chronic hypercarbia with pCO2 ~70-80 and compensated bicarb, at times up to the 40-50 range. Hypercarbia worsened despite changing BiPAP delta to improve ventilation V 7.09/106/63/32. She was subsequently intubated for hypercarbia. Plan for overnight stabilization, chest CT imaging, and consideration of transfer to [facility] in AM.   Pulmonology following, appreciate recs  Pertinent diagnostics:  Non-con chest CT tonight  TTE in AM   Trend VBG, note ABG following intubation was venous   DSA re timed for 2/18 --> d/w lab need to  over special collection tube  Tacro level in AM  Infectious work-up per ID section  CMV 24/400/5/30 --> Wean TV to 7mL/kg IBW as gasses improve  Empiric abx started   Volume removal with daily HD, may consider CRRT pending hemodynamics   2L O2 at night when extubated, needs overnight oximetry prior to discharge   Arrange for sleep study following discharge   Pulm toilet with areobika and IS when extubated    Immunosuppression/ppx:  Pred 2.5 AM/5 PM  Tacro (goal 7-9)  Dapsone MWF      Cardiovascular:  # Elevated BNP with pulm edema c/f decompensated HFpEF vs volume overload  # HFpEF  # HTN  TTE in AM   Tele and VS per ICU routine  Goal MAP > 65 mmHg   Briefly required vasopressors vinayak-intubation, now titrated off  Hold metoprolol tartrate overnight, resume as hemodynamics allow  Consider early stress dose steroids if consistent pressor  requirement given chronic prednisone use    ECHO 2/28/23:   EF 55-60%. No WMA. Normal RV size/fxn, normal PASP.     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 Over the last year has lost 40 lbs (jan 2023 136 lbs, 95 lbs on admission), unable to tolerate any combination of TF, most recently elemental formula. Normal fecal elastase last May. Following with Dr. Navarro who feels her main two issues are vagal injury induced gastroparesis and probably small bowel hypomotility. Her intolerance to J-tube feeds suggests the latter to be a significant issue (notes in a message that there are no motility experts at the  and he is limited in what can be offered). Now s/p port placement with TPN and lipids. GI recommending trickle feeds and CT to assess for structural issues.   GI consulted, recs CT enterography, unable to obtain d/t likely inability to tolerate 1.5L of enteral contrast over 1 hr, could consider now that pt has a protected airway  RD following appreciate recs  TF @ 10 ml/hr via J-port  TPN + lipids, limit volume to 1.5L   Consider resuming SIBO treatment with rifaximin if recurrent GI symptoms   Monitor for refeeding syndrome  PRN loperamide, last BM 2/17  Continue PTA PPI BID      Renal/Fluids/Electrolytes:  # ESRD on HD (T/Th/Sat)   # Hypervolemic hyponatremia  # Hypomagnesemia and hypophosphatemia, possible refeeding syndrome  # Lower extremity swelling 2/2 hypoalbuminemia   Neph following, appreciate recs   Daily iHD for volume removal  Hold sevelamer d/t hypophosphatemia, likely in setting of refeeding syndrome  Daily bladder scans  Strict I&O and daily weights    Monitor chem panels   Mag sulfate 2g, consider phos supplement in AM if hypophosphatemia persists   Lymphedema consult, elevate legs while sedated     Endocrine:  # Low T4  # Iatrogenic hypoglycemia  TSH 6.50, T4 0.64, which is a new finding for her. Somewhat difficult to interpret in the setting of critical illness, but given FTT would favor thyroid hormone repletion.  Start levothyroxine in AM, need to discuss dosing strategy with pharmacist   BG 68 after TPN held vinayak-intubation  Hypoglycemia protocol, resume TPN and TF    ID:  # Concern for PNA   # Hx EBV viremia   # Hypogammaglobulinemia  # Chronic immunosuppression  No leukocytosis or fevers, Pcal 0.50, however pt immunosuppressed  Obtain chest CT to further evaluate new pulm opacities    Cultures:  2/17 BC - NGTD  2/17 COVID - NEG  2/17 Sputum, NGTD  2/17 RVP - in process  2/17 MRSA - in process   2/18 UA w/reflex - needs to be collected  Antibiotics:  Vancomycin (2/17 - )  Zosyn (2/17 - )  Dapsone MWF, PJP ppx   Immunosuppression  Tacrolimus  Prednisone     Hematology:    # Chronic anemia in the setting of ESRD, exacerbated by recent fall with extensive bruising  # Thrombocytopenia  # Unilateral LUE swelling     Daily CBC with diff  Venofir and Epogen per nephrology  Ppx heparin  LUE US in AM to rule out DVT    Musculoskeletal:  # FTT with significant deconditioning  # R hip fracture 12/23, s/p ORIF  # Recurrent falls, recent fall with facial trauma  PT/OT consult    Skin:  # Facial and neck bruising  Diligent skin cares to prevent breakdown    General Cares/Prophylaxis:    DVT Prophylaxis: Heparin SQ  GI Prophylaxis: PPI  Restraints: Not currently requiring  Family Communication: Updated daughters Charity and Julia and sister Silvia over the phone  Code Status: Full    Lines/tubes/drains:  R tunneled internal jugular  PIV x2  LUE AV fistula   G/J tube  ETT     Disposition:  [facility] adult ICU, consider [place] transfer in AM    Patient seen and  findings/plan discussed with medical ICU staff, Dr. Pena .    Ximena Duncan NP  Critical care time 120 minutes exclusive of procedures in direct care, care coordination, and discussion with family members           -----------------------------------------------------------------------    HISTORY PRESENTING ILLNESS:    Sofie Rodriguez is a 63 year old female with PMH notable for bilateral lung transplant in 2022 for COPD, ESRD on HD (T/Th/S), gastroparesis s/p PEG/J, severe malnutrition, recent R femoral fracture s/p ORIF in [place]. Admitted 2/10/24 to [facility] for FTT and small bowel dysmotility for port-a-cath placement and TPN initiation. Transferred to ICU evening of 2/17/24 for acute on chronic hypoxic and hypercarbic respiratory failure with severe hypercarbia and AMS requiring BiPAP, ddx includes pulmonary edema vs infection, possible mixed picture.    Initially admitted 2/10/24 for severe malnutrition and FTT for tunneled right IJ placement and initiation of TPN.  Requiring daily dialysis for volume overload in the setting of TPN.  On 2/17/2024 patient noted to be more lethargic.  Workup notable for VBG 7.15/98/42/34, which is the most likely culprit of her AMS.  Transferred to ICU and placed on BiPAP, but VBG worsened and patient subsequently intubated.  Currently undergoing infectious workup with pan cultures and chest CT.  Head CT negative for acute intracranial anomalies.  Broad-spectrum antibiotics started with vancomycin and Zosyn.     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    Hepatitis 2017    Hep C, [facility]    History of blood transfusion     HTN (hypertension)     Infectious mononucleosis      Lung infection 11/30/2022    Osteopenia      SURGICAL HISTORY:  Past Surgical History:   Procedure Laterality Date    BRONCHOSCOPY (RIGID OR FLEXIBLE), DIAGNOSTIC N/A 08/02/2022    Procedure: BRONCHOSCOPY, DIAGNOSTIC- inspection Bronch;  Surgeon: Kamala Lovell MD;  Location: [facility]    BRONCHOSCOPY (RIGID OR FLEXIBLE), DIAGNOSTIC N/A 09/13/2022    Procedure: INSPECTION BRONCHOSCOPY, WITH BRONCHOALVEOLAR LAVAGE;  Surgeon: Jose R Mccullough MD;  [facility]: [facility]    BRONCHOSCOPY (RIGID OR FLEXIBLE), DIAGNOSTIC N/A 11/09/2022    Procedure: BRONCHOSCOPY, WITH BRONCHOALVEOLAR LAVAGE AND BIOPSY;  Surgeon: Cesar Lima MD;  [facility]: [facility]    BRONCHOSCOPY (RIGID OR FLEXIBLE), DIAGNOSTIC N/A 01/25/2023    Procedure: BRONCHOSCOPY, WITH BRONCHOALVEOLAR LAVAGE AND BIOPSY;  Surgeon: Mason Reddy MD;  [facility]    BRONCHOSCOPY (RIGID OR FLEXIBLE), DIAGNOSTIC N/A 04/19/2023    Procedure: BRONCHOSCOPY, WITH BRONCHOALVEOLAR LAVAGE AND BIOPSY;  Surgeon: Kamala Lovell MD;  [facility]    BRONCHOSCOPY (RIGID OR FLEXIBLE), DIAGNOSTIC N/A 07/12/2023    Procedure: BRONCHOSCOPY, WITH BRONCHOALVEOLAR LAVAGE AND BIOPSY;  Surgeon: Cesar Lima MD;  [facility]    BRONCHOSCOPY FLEXIBLE AND RIGID N/A 07/19/2022    Procedure: BRONCHOSCOPY inspection only;  Surgeon: Bob Liao MD;  [facility]: [facility]    COLONOSCOPY  2015    CORONARY ANGIOGRAPHY ADULT ORDER      CV CORONARY ANGIOGRAM N/A 06/30/2021    Procedure: CV CORONARY ANGIOGRAM;  Surgeon: Alexander Cuellar MD;  Location:  HEART CARDIAC CATH LAB    CV RIGHT HEART CATH MEASUREMENTS RECORDED N/A 06/30/2021    Procedure: CV RIGHT HEART CATH;  Surgeon: Alexander Cuellar MD;  Location:  HEART CARDIAC CATH LAB    ENDOSCOPIC PERORAL MYOTOMY N/A 10/09/2023    Procedure: MYOTOMY, ESOPHAGUS, ENDOSCOPIC, ORAL APPROACH;  Surgeon: Gonzalez Navarro MD;  [facility]    ENDOSCOPIC RETROGRADE CHOLANGIOPANCREATOGRAM N/A 08/11/2022    Procedure: ENDOSCOPIC  RETROGRADE CHOLANGIOPANCREATOGRAPHY WITH PANCREATIC DUCT NEEDLE KNIFE AND STENT PLACEMENT, BILE DUCT SPHINCTEROTOMY, BLOOD/DEBRIS REMOVAL AND STENT PLACEMENT;  Surgeon: Cosmo Arroyo MD;  [facility]    ENDOSCOPIC RETROGRADE CHOLANGIOPANCREATOGRAM N/A 10/07/2022    Procedure: ENDOSCOPIC RETROGRADE CHOLANGIOPANCREATOGRAPHY with biliary and pancreatic stent removal, debris removal;  Surgeon: Cosmo Arroyo MD;  [facility]    ENT SURGERY  1974    tonsillectomy    ENTEROSCOPY SMALL BOWEL N/A 08/11/2022    Procedure: SMALL BOWEL ENTEROSCOPY;  Surgeon: Cosmo Arroyo MD;  [facility]    ESOPHAGOGASTRODUODENOSCOPY, WITH NASOGASTRIC TUBE INSERTION N/A 07/01/2022    Procedure: ESOPHAGOGASTRODUODENOSCOPY, WITH NASOJEJUNAL TUBE INSERTION;  Surgeon: Ozzy Nickerson MD;  [facility]    ESOPHAGOSCOPY, GASTROSCOPY, DUODENOSCOPY (EGD), COMBINED N/A 08/03/2022    Procedure: ESOPHAGOGASTRODUODENOSCOPY (EGD);  Surgeon: Ira Anrdes MD;  [facility]    ESOPHAGOSCOPY, GASTROSCOPY, DUODENOSCOPY (EGD), COMBINED N/A 01/25/2023    Procedure: ESOPHAGOGASTRODUODENOSCOPY (EGD) with botox injection;  Surgeon: Gonzalez Navarro MD;  [facility]    ESOPHAGOSCOPY, GASTROSCOPY, DUODENOSCOPY (EGD), COMBINED N/A 10/09/2023    Procedure: ESOPHAGOGASTRODUODENOSCOPY;  Surgeon: Gonzalez Navarro MD;  [facility]    HAND SURGERY      INSERT CHEST TUBE Right 09/13/2022    Procedure: Insert chest tube;  Surgeon: Jose R Mccullough MD;  [facility]    IR CVC TUNNEL PLACEMENT > 5 YRS OF AGE  09/26/2022    IR GASTRO JEJUNOSTOMY TUBE CHANGE  08/31/2022    IR GASTRO JEJUNOSTOMY TUBE CHANGE  12/21/2022    IR GASTRO JEJUNOSTOMY TUBE CHANGE  07/12/2023    IR GASTRO JEJUNOSTOMY TUBE CHANGE  08/18/2023    IR GASTRO JEJUNOSTOMY TUBE CHANGE  11/14/2023    IR GASTRO JEJUNOSTOMY TUBE PLACEMENT  07/27/2022    IR THORACENTESIS  08/29/2022    LEEP TX, CERVICAL  04/07/2017    HECTOR III    LYMPH NODE  BIOPSY Left 2005    Left axilla, benign- [place]    MIDLINE INSERTION - DOUBLE LUMEN Left 07/28/2022    20cm, Basilic vein    REPLACE GASTROJEJUNOSTOMY TUBE, PERCUTANEOUS  10/07/2022    Procedure: Replace Gastrojejunostomy Tube;  Surgeon: Cosmo Arroyo MD;  [facility]    THORACENTESIS Left 08/29/2022    Procedure: THORACENTESIS;  Surgeon: Bo Capone PA-C;  Location: [facility] OR    THORACENTESIS Left 09/13/2022    Procedure: Thoracentesis;  Surgeon: Jose R Mccullough MD;  [facility]    THROMBECTOMY UPPER EXTREMITY Left 07/02/2022    Procedure: LEFT RADIAL ARM THROMBECTOMY;  Surgeon: Christie Graham MD;  [facility]    TRANSPLANT LUNG RECIPIENT SINGLE X2 Bilateral 06/28/2022    Procedure: Clamshell Incision, Bilateral Sequential Lung Transplant, On Cardiopulmonary Bypass, Flexible Bronchoscopy;  Surgeon: Sue Sunshine MD;  [facility]     SOCIAL HISTORY:  Social History     Socioeconomic History    Marital status:    Tobacco Use    Smoking status: Former     Packs/day: 0.50     Years: 30.00     Additional pack years: 0.00     Total pack years: 15.00     Types: Cigarettes     Quit date: 11/11/2020     Years since quitting: 3.2    Smokeless tobacco: Never   Substance and Sexual Activity    Alcohol use: Not Currently     Comment: Stopped drinking in 2020    Drug use: Not Currently     Types: Marijuana, Methamphetamines     Comment: hx:marijuana and methamphetamine-quit both unsure ?  2-3 yrs ago     Social Determinants of Health     Interpersonal Safety: Not At Risk (9/1/2023)    Humiliation, Afraid, Rape, and Kick questionnaire     Fear of Current or Ex-Partner: No     Emotionally Abused: No     Physically Abused: No     Sexually Abused: No     FAMILY HISTORY:   No family history on file.  ALLERGIES:   Allergies   Allergen Reactions    Blood Transfusion Related (Informational Only) Other (See Comments)     Patient has a history of a clinically significant antibody  against RBC antigens.  A delay in compatible RBCs may occur.     No Clinical Screening - See Comments Other (See Comments)     Patient has a history of a clinically significant antibody against RBC antigens.  A delay in compatible RBCs may occur.    Azithromycin Rash     12/1/22: Developed a rash that is not raised and looks diffuse in nature. It started in the groin and up the back and has now worked its way up her chest into her face. Pt states that it has now started to itch. She is breathing and talking normally and denies any airway changes. Unclear what started rash. Pt noted feeling somewhat itchy yesterday.    Ganciclovir Rash     12/1/22: Developed a rash that is not raised and looks diffuse in nature. It started in the groin and up the back and has now worked its way up her chest into her face. Pt states that it has now started to itch. She is breathing and talking normally and denies any airway changes. Unclear what started rash. Pt noted feeling somewhat itchy yesterday.     MEDICATIONS:  No current facility-administered medications on file prior to encounter.  acetaminophen (TYLENOL) 500 MG tablet, 500-1,000 mg by Per J Tube route 3 times daily as needed for mild pain  B Complex-C-Folic Acid (DIALYVITE) TABS, 1 tablet by Per J Tube route every morning  Calcium Carbonate-Vitamin D 600-10 MG-MCG TABS, 1 tablet by Per J Tube route 3 times daily  dapsone 2 mg/mL SUSP, 25 mLs (50 mg) by Per J Tube route Every Mon, Wed, Fri Morning  furosemide (LASIX) 40 MG tablet, 40 mg by Per J Tube route 2 times daily  loperamide (IMODIUM A-D) 2 MG tablet, 1 tablet (2 mg) by Per J Tube route 4 times daily as needed for diarrhea  metoprolol tartrate (LOPRESSOR) 50 MG tablet, 0.5 tablets (25 mg) by Per Feeding Tube route 2 times daily  multivitamin (CENTRUM SILVER) tablet, Take 1 tablet by mouth daily  pantoprazole (PROTONIX) 2 mg/mL SUSP suspension, 20 mLs (40 mg) by Per J Tube route 2 times daily  predniSONE (DELTASONE) 5  MG tablet, Take 1 tablet (5 mg) by mouth every morning AND 0.5 tablets (2.5 mg) every evening. Take 1 tab (5mg) at AM and 1/2 tab (2.5) in pm (Patient taking differently: Take 1 tablet (5 mg) by J-tube every morning AND 0.5 tablets (2.5 mg) every evening. Take 1 tab (5mg) at AM and 1/2 tab (2.5) in pm)  protein modular (PROSOURCE TF) LIQD, 1 packet by Per Feeding Tube route daily (Patient taking differently: 1 packet by Per Feeding Tube route daily at 2 pm)  sevelamer carbonate, RENVELA, 0.8 GM PACK Packet, Take 1 packet (0.8 g) by mouth 3 times daily (with meals) (Patient taking differently: 0.8 g by Per J Tube route 3 times daily (with meals))  tacrolimus (GENERIC) 1 mg/mL suspension, Take 4 mLs (4 mg) by mouth every morning AND 4 mLs (4 mg) every evening. (Patient taking differently: Take 4 mLs (4 mg) by j-tube every morning AND 4 mLs (4 mg) every evening.)  vitamin B-12 (CYANOCOBALAMIN) 500 MCG tablet, 1 tablet (500 mcg) by Per Feeding Tube route daily  [DISCONTINUED] albuterol (PROAIR HFA/PROVENTIL HFA/VENTOLIN HFA) 108 (90 BASE) MCG/ACT Inhaler, Inhale 2 puffs into the lungs every 6 hours as needed for shortness of breath / dyspnea or wheezing  [DISCONTINUED] insulin glargine (LANTUS PEN) 100 UNIT/ML pen, Inject 7 Units Subcutaneous 2 times daily  [DISCONTINUED] ipratropium - albuterol 0.5 mg/2.5 mg/3 mL (DUONEB) 0.5-2.5 (3) MG/3ML neb solution, Inhale 1 vial into the lungs every 4 hours as needed for shortness of breath / dyspnea  [DISCONTINUED] mycophenolate (GENERIC EQUIVALENT) 200 MG/ML suspension, 3.75 mLs (750 mg) by Per J Tube route 2 times daily  [DISCONTINUED] valGANciclovir (VALCYTE) 50 MG/ML solution, 9 mLs (450 mg) by Oral or Feeding Tube route three times a week (Patient taking differently: 450 mg by Oral or Feeding Tube route three times a week Take on Mon/Wed/Fri)        PHYSICAL EXAMINATION:  Temp:  [97.7  F (36.5  C)-98.5  F (36.9  C)] 97.7  F (36.5  C)  Pulse:  [77-97] 88  Resp:  [12-31]  31  BP: ()/(52-67) 106/53  FiO2 (%):  [45 %] 45 %  SpO2:  [93 %-100 %] 100 %  General: Acute on chronically ill appearing cachectic female  HEENT: Normocephalic, Bruising to R face and neck, small hematoma to R eyelid, oral mucosa moist, neck supple   Neuro: Somnolent prior to intubation, moving all extremities with equal strength, pupils 3mm PERRL.  Pulm/Resp: Fine crackles bilaterally, compliant with mechanical ventilation, scant amount of thick yellow/green secretions suctioned from ETT   CV: RRR, no RMG, extremities warm, 2+ pitting edema to bilateral lower extremities   Abdomen: Soft, flat, non-distended, non-tender, G-J tube site clean without surrounding redness or drainage  : Anuric, LUE AV fistula with + bruit & thrill   Incisions/Skin: LUE with unilateral 2+ edema, bilateral lower extremity 2+ pitting edema, scattered bruising     LABS: Reviewed.   Arterial Blood Gases   Recent Labs   Lab 02/17/24 2120   PH 7.25*   PCO2 78*   PO2 34*   HCO3 34*     Venous Blood Gas  Recent Labs   Lab 02/17/24  2214 02/17/24 2120 02/17/24  1950 02/17/24 1830 02/17/24  1650   PHV 7.31*  --  7.09* 7.15* 7.15*   PCO2V 70*  --  106* 94* 98*   PO2V 14*  --  63* 55* 42   HCO3V 35*  --  32* 33* 34*   LINDY 7.5*  --  1.4 3.0 3.8*   O2PER 30 30 45 45 35         Complete Blood Count   Recent Labs   Lab 02/17/24  1830 02/16/24  0703 02/15/24  0620 02/14/24  0807 02/13/24  0700 02/12/24  0756   WBC 4.2  --   --  4.5 4.5 5.3   HGB 7.8*  --  8.6* 8.2* 9.1* 9.2*   * 183  --  175 191 216     Basic Metabolic Panel  Recent Labs   Lab 02/18/24  0051 02/18/24  0023 02/17/24  1953 02/17/24  1830 02/17/24  1806 02/17/24  1243 02/16/24  1820 02/16/24  0703 02/15/24  0737 02/15/24  0620   NA  --   --   --  130*  --  130*  --  130*  --  133*   POTASSIUM  --   --   --  3.6  --  3.9  --  4.0  --  4.8   CHLORIDE  --   --   --  94*  --  94*  --  93*  --  98   CO2  --   --   --  31*  --  25  --  27  --  27   BUN  --   --   --  17.4  --   31.7*  --  32.2*  --  34.3*   CR  --   --   --  1.81*  --  2.86*  --  3.14*  --  3.95*   * 68* 128* 131*   < > 233*   < > 181*   < > 120*    < > = values in this interval not displayed.     Liver Function Tests  Recent Labs   Lab 02/17/24  1830 02/17/24  1243 02/16/24  0703 02/14/24  0807 02/13/24  1658 02/13/24  0700   AST 13 15 17 17  --  31   ALT <5 <5 <5 8  --  8   ALKPHOS 62 79 66 62  --  56   BILITOTAL 0.2 0.2 <0.2 0.2  --  0.2   ALBUMIN 3.1* 3.1* 3.1* 3.2*  --  3.1*   INR 0.90  --   --   --  0.92 0.97     Coagulation Profile  Recent Labs   Lab 02/17/24  1830 02/13/24  1658 02/13/24  0700   INR 0.90 0.92 0.97       IMAGING:  Recent Results (from the past 24 hour(s))   XR Chest Port 1 View    Narrative    EXAM: XR CHEST PORT 1 VIEW  2/17/2024 10:02 AM     HISTORY:  decreased mental status, labored breathing.       COMPARISON:  2/16/2024    TECHNIQUE: Portable AP semiupright view of the chest    FINDINGS:   Right IJ CVC tip terminates over the cavoatrial junction. Postsurgical  changes of bilateral lung transplantation with intact clamshell  sternotomy wires..    Stable enlarged cardiac silhouette. Unchanged blunting of the  costophrenic angles. Stable small left pleural effusion. Trace right  effusion. No pneumothorax. Increased mixed opacities in the lung  fields bilaterally, left greater than right.      Impression    IMPRESSION:  1. Increased mixed opacities in the lung fields bilaterally, left  greater than right, which may represent edema, atelectasis, or  infection.  2. Stable small left and trace right pleural effusion.    I have personally reviewed the examination and initial interpretation  and I agree with the findings.    ISIDRO NOVAK MD         SYSTEM ID:  [id number]   XR Chest Port 1 View    Impression    RESIDENT PRELIMINARY INTERPRETATION  IMPRESSION:  Slightly improved pulmonary edema and unchanged bilateral pleural  effusions compared to the 9:34 AM radiograph. Otherwise,  no  significant change.

## 2024-02-18 NOTE — ANESTHESIA PROCEDURE NOTES
Airway       Patient location during procedure: ICU       Procedure Start/Stop Times: 2/17/2024 8:50 PM  Staff -        CRNA: Vane Pool APRN CRNA       Performed By: CRNA  Consent for Airway        Urgency: emergent       Consent: The procedure was performed in an emergent situation.  Report Obtained from Primary Care Team       History regarding most recent potassium obtained: Yes       History regarding presence/absence of renal failure obtained:Yes       History regarding stroke/CVA obtained:Yes       History regarding presence/absence of NM disorder: YesIndications and Patient Condition       Indications for airway management: respiratory insufficiency       Induction type:intravenous       Mask difficulty assessment: 1 - vent by mask    Final Airway Details       Final airway type: endotracheal airway       Successful airway: ETT - single and Oral  Endotracheal Airway Details        ETT size (mm): 7.0       Cuffed: yes       Successful intubation technique: video laryngoscopy       VL Blade Size: MAC 3       Grade View of Cords: 1       Adjucts: stylet       Position: Center       Measured from: gums/teeth       Secured at (cm): 20       Bite block used: None    Post intubation assessment        ETT secured, Vent settings by primary/ICU team, Primary/ICU team to review CXR, Sedation to be ordered by primary/ICU team and No apparent complications       Placement verified by: capnometry, equal breath sounds and chest rise        Number of attempts at approach: 1       Number of other approaches attempted: 0       Secured with: commercial tube blake       Ease of procedure: easy       Dentition: Intact and Unchanged    Medication(s) Administered   propofol (DIPRIVAN) injection 10 mg/mL vial - Intravenous    - 2/17/2024 8:49:00 PM  Medication Administration Time: 2/17/2024 8:50 PM

## 2024-02-18 NOTE — PROGRESS NOTES
Assisted with  bronch at bedside with BAL and diagnostics.  Mild bleeding at site after lavage. Minimal secretions.      VSS Patient remained on prior vent settings.

## 2024-02-18 NOTE — ANESTHESIA PROCEDURE NOTES
Airway       Patient location during procedure: ICU       Procedure Start/Stop Times: 2/17/2024 8:50 PM  Staff -        CRNA: Vane Pool APRN CRNA       Performed By: CRNA  Consent for Airway        Urgency: emergent       Consent: The procedure was performed in an emergent situation.  Report Obtained from Primary Care Team       History regarding most recent potassium obtained: Yes       History regarding presence/absence of renal failure obtained:Yes       History regarding stroke/CVA obtained:Yes       History regarding presence/absence of NM disorder: YesIndications and Patient Condition       Indications for airway management: respiratory insufficiency       Induction type:intravenous       Mask difficulty assessment: 1 - vent by mask    Final Airway Details       Final airway type: endotracheal airway       Successful airway: ETT - single and Oral  Endotracheal Airway Details        ETT size (mm): 7.0       Cuffed: yes       Successful intubation technique: video laryngoscopy       VL Blade Size: MAC 3       Grade View of Cords: 1       Adjucts: stylet       Position: Center       Measured from: gums/teeth       Secured at (cm): 20       Bite block used: None    Post intubation assessment        ETT secured, Vent settings by primary/ICU team, Primary/ICU team to review CXR, Sedation to be ordered by primary/ICU team and No apparent complications       Placement verified by: capnometry, equal breath sounds and chest rise        Number of attempts at approach: 1       Number of other approaches attempted: 0       Secured with: commercial tube blake       Ease of procedure: easy       Dentition: Intact and Unchanged    Medication(s) Administered   propofol (DIPRIVAN) injection 10 mg/mL vial - Intravenous   50 mg - 2/17/2024 8:49:00 PM   30 mg - 2/17/2024 8:50:00 PM  Medication Administration Time: 2/17/2024 8:50 PM

## 2024-02-18 NOTE — PROGRESS NOTES
New order for Ziopatch monitor placed.   
All other review of systems negative, except as noted in HPI

## 2024-02-19 ENCOUNTER — APPOINTMENT (OUTPATIENT)
Dept: PHYSICAL THERAPY | Facility: CLINIC | Age: 64
DRG: 640 | End: 2024-02-19
Attending: INTERNAL MEDICINE
Payer: MEDICARE

## 2024-02-19 ENCOUNTER — APPOINTMENT (OUTPATIENT)
Dept: OCCUPATIONAL THERAPY | Facility: CLINIC | Age: 64
DRG: 640 | End: 2024-02-19
Attending: INTERNAL MEDICINE
Payer: MEDICARE

## 2024-02-19 LAB
ALBUMIN SERPL BCG-MCNC: 2.7 G/DL (ref 3.5–5.2)
ALP SERPL-CCNC: 53 U/L (ref 40–150)
ALT SERPL W P-5'-P-CCNC: <5 U/L (ref 0–50)
ANION GAP SERPL CALCULATED.3IONS-SCNC: 13 MMOL/L (ref 7–15)
AST SERPL W P-5'-P-CCNC: 14 U/L (ref 0–45)
ATRIAL RATE - MUSE: 72 BPM
ATRIAL RATE - MUSE: 87 BPM
BACTERIA SPT CULT: NO GROWTH
BASE EXCESS BLDV CALC-SCNC: -1.7 MMOL/L (ref -3–3)
BASE EXCESS BLDV CALC-SCNC: 1.2 MMOL/L (ref -3–3)
BASOPHILS # BLD AUTO: ABNORMAL 10*3/UL
BASOPHILS # BLD MANUAL: 0.1 10E3/UL (ref 0–0.2)
BASOPHILS NFR BLD AUTO: ABNORMAL %
BASOPHILS NFR BLD MANUAL: 2 %
BILIRUB SERPL-MCNC: 0.2 MG/DL
BUN SERPL-MCNC: 31.8 MG/DL (ref 8–23)
CALCIUM SERPL-MCNC: 8.2 MG/DL (ref 8.8–10.2)
CHLORIDE SERPL-SCNC: 93 MMOL/L (ref 98–107)
CMV DNA SPEC NAA+PROBE-ACNC: NOT DETECTED IU/ML
CREAT SERPL-MCNC: 2.7 MG/DL (ref 0.51–0.95)
DEPRECATED HCO3 PLAS-SCNC: 23 MMOL/L (ref 22–29)
DIASTOLIC BLOOD PRESSURE - MUSE: NORMAL MMHG
DIASTOLIC BLOOD PRESSURE - MUSE: NORMAL MMHG
EBV DNA COPIES/ML, INSTRUMENT: ABNORMAL COPIES/ML
EBV DNA SPEC NAA+PROBE-LOG#: 4.4 {LOG_COPIES}/ML
EGFRCR SERPLBLD CKD-EPI 2021: 19 ML/MIN/1.73M2
EOSINOPHIL # BLD AUTO: ABNORMAL 10*3/UL
EOSINOPHIL # BLD MANUAL: 0.1 10E3/UL (ref 0–0.7)
EOSINOPHIL NFR BLD AUTO: ABNORMAL %
EOSINOPHIL NFR BLD MANUAL: 3 %
ERYTHROCYTE [DISTWIDTH] IN BLOOD BY AUTOMATED COUNT: 20.2 % (ref 10–15)
GLUCOSE BLDC GLUCOMTR-MCNC: 113 MG/DL (ref 70–99)
GLUCOSE BLDC GLUCOMTR-MCNC: 117 MG/DL (ref 70–99)
GLUCOSE BLDC GLUCOMTR-MCNC: 123 MG/DL (ref 70–99)
GLUCOSE BLDC GLUCOMTR-MCNC: 123 MG/DL (ref 70–99)
GLUCOSE BLDC GLUCOMTR-MCNC: 126 MG/DL (ref 70–99)
GLUCOSE BLDC GLUCOMTR-MCNC: 159 MG/DL (ref 70–99)
GLUCOSE BLDC GLUCOMTR-MCNC: 93 MG/DL (ref 70–99)
GLUCOSE BLDC GLUCOMTR-MCNC: 95 MG/DL (ref 70–99)
GLUCOSE BLDC GLUCOMTR-MCNC: 95 MG/DL (ref 70–99)
GLUCOSE SERPL-MCNC: 96 MG/DL (ref 70–99)
GRAM STAIN RESULT: NORMAL
GRAM STAIN RESULT: NORMAL
HCO3 BLDV-SCNC: 26 MMOL/L (ref 21–28)
HCO3 BLDV-SCNC: 27 MMOL/L (ref 21–28)
HCT VFR BLD AUTO: 22.8 % (ref 35–47)
HGB BLD-MCNC: 7.1 G/DL (ref 11.7–15.7)
IMM GRANULOCYTES # BLD: ABNORMAL 10*3/UL
IMM GRANULOCYTES NFR BLD: ABNORMAL %
INR PPP: 1.03 (ref 0.85–1.15)
INTERPRETATION ECG - MUSE: NORMAL
INTERPRETATION ECG - MUSE: NORMAL
LYMPHOCYTES # BLD AUTO: ABNORMAL 10*3/UL
LYMPHOCYTES # BLD MANUAL: 0.7 10E3/UL (ref 0.8–5.3)
LYMPHOCYTES NFR BLD AUTO: ABNORMAL %
LYMPHOCYTES NFR BLD MANUAL: 24 %
MAGNESIUM SERPL-MCNC: 2.1 MG/DL (ref 1.7–2.3)
MAGNESIUM SERPL-MCNC: 2.1 MG/DL (ref 1.7–2.3)
MAGNESIUM SERPL-MCNC: 2.3 MG/DL (ref 1.7–2.3)
MAGNESIUM SERPL-MCNC: 2.4 MG/DL (ref 1.7–2.3)
MCH RBC QN AUTO: 34.5 PG (ref 26.5–33)
MCHC RBC AUTO-ENTMCNC: 31.1 G/DL (ref 31.5–36.5)
MCV RBC AUTO: 111 FL (ref 78–100)
METAMYELOCYTES # BLD MANUAL: 0.1 10E3/UL
METAMYELOCYTES NFR BLD MANUAL: 2 %
MONOCYTES # BLD AUTO: ABNORMAL 10*3/UL
MONOCYTES # BLD MANUAL: 0.3 10E3/UL (ref 0–1.3)
MONOCYTES NFR BLD AUTO: ABNORMAL %
MONOCYTES NFR BLD MANUAL: 10 %
MYELOCYTES # BLD MANUAL: 0 10E3/UL
MYELOCYTES NFR BLD MANUAL: 1 %
NEUTROPHILS # BLD AUTO: ABNORMAL 10*3/UL
NEUTROPHILS # BLD MANUAL: 1.8 10E3/UL (ref 1.6–8.3)
NEUTROPHILS NFR BLD AUTO: ABNORMAL %
NEUTROPHILS NFR BLD MANUAL: 58 %
NRBC # BLD AUTO: 0 10E3/UL
NRBC BLD AUTO-RTO: 0 /100
O2/TOTAL GAS SETTING VFR VENT: 21 %
O2/TOTAL GAS SETTING VFR VENT: 30 %
OXYHGB MFR BLDV: 75 % (ref 70–75)
OXYHGB MFR BLDV: 82 % (ref 70–75)
P AXIS - MUSE: 34 DEGREES
P AXIS - MUSE: 38 DEGREES
PCO2 BLDV: 45 MM HG (ref 40–50)
PCO2 BLDV: 56 MM HG (ref 40–50)
PH BLDV: 7.27 [PH] (ref 7.32–7.43)
PH BLDV: 7.38 [PH] (ref 7.32–7.43)
PHOSPHATE SERPL-MCNC: 2.4 MG/DL (ref 2.5–4.5)
PHOSPHATE SERPL-MCNC: 3.6 MG/DL (ref 2.5–4.5)
PHOSPHATE SERPL-MCNC: 3.9 MG/DL (ref 2.5–4.5)
PLAT MORPH BLD: ABNORMAL
PLATELET # BLD AUTO: 146 10E3/UL (ref 150–450)
PO2 BLDV: 44 MM HG (ref 25–47)
PO2 BLDV: 45 MM HG (ref 25–47)
POTASSIUM SERPL-SCNC: 3.7 MMOL/L (ref 3.4–5.3)
POTASSIUM SERPL-SCNC: 4 MMOL/L (ref 3.4–5.3)
POTASSIUM SERPL-SCNC: 4.3 MMOL/L (ref 3.4–5.3)
PR INTERVAL - MUSE: 148 MS
PR INTERVAL - MUSE: 154 MS
PREALB SERPL-MCNC: 17 MG/DL (ref 20–40)
PROT SERPL-MCNC: 4.6 G/DL (ref 6.4–8.3)
QRS DURATION - MUSE: 82 MS
QRS DURATION - MUSE: 88 MS
QT - MUSE: 328 MS
QT - MUSE: 444 MS
QTC - MUSE: 394 MS
QTC - MUSE: 454 MS
R AXIS - MUSE: 20 DEGREES
R AXIS - MUSE: 60 DEGREES
RBC # BLD AUTO: 2.06 10E6/UL (ref 3.8–5.2)
RBC MORPH BLD: ABNORMAL
SAO2 % BLDV: 77.1 % (ref 70–75)
SAO2 % BLDV: 84.4 % (ref 70–75)
SODIUM SERPL-SCNC: 129 MMOL/L (ref 135–145)
SYSTOLIC BLOOD PRESSURE - MUSE: NORMAL MMHG
SYSTOLIC BLOOD PRESSURE - MUSE: NORMAL MMHG
T AXIS - MUSE: 101 DEGREES
T AXIS - MUSE: 164 DEGREES
TACROLIMUS BLD-MCNC: 8.5 UG/L (ref 5–15)
TME LAST DOSE: NORMAL H
TME LAST DOSE: NORMAL H
VENTRICULAR RATE- MUSE: 63 BPM
VENTRICULAR RATE- MUSE: 87 BPM
WBC # BLD AUTO: 3.1 10E3/UL (ref 4–11)

## 2024-02-19 PROCEDURE — 97535 SELF CARE MNGMENT TRAINING: CPT | Mod: GO

## 2024-02-19 PROCEDURE — 99233 SBSQ HOSP IP/OBS HIGH 50: CPT | Mod: 24 | Performed by: INTERNAL MEDICINE

## 2024-02-19 PROCEDURE — 97530 THERAPEUTIC ACTIVITIES: CPT | Mod: GP

## 2024-02-19 PROCEDURE — 250N000012 HC RX MED GY IP 250 OP 636 PS 637

## 2024-02-19 PROCEDURE — 85027 COMPLETE CBC AUTOMATED: CPT

## 2024-02-19 PROCEDURE — 85610 PROTHROMBIN TIME: CPT | Performed by: INTERNAL MEDICINE

## 2024-02-19 PROCEDURE — 90937 HEMODIALYSIS REPEATED EVAL: CPT

## 2024-02-19 PROCEDURE — 250N000012 HC RX MED GY IP 250 OP 636 PS 637: Performed by: INTERNAL MEDICINE

## 2024-02-19 PROCEDURE — 258N000003 HC RX IP 258 OP 636: Performed by: INTERNAL MEDICINE

## 2024-02-19 PROCEDURE — 84132 ASSAY OF SERUM POTASSIUM: CPT | Performed by: NURSE PRACTITIONER

## 2024-02-19 PROCEDURE — 200N000002 HC R&B ICU UMMC

## 2024-02-19 PROCEDURE — 250N000013 HC RX MED GY IP 250 OP 250 PS 637: Performed by: INTERNAL MEDICINE

## 2024-02-19 PROCEDURE — B4185 PARENTERAL SOL 10 GM LIPIDS: HCPCS | Mod: JZ | Performed by: INTERNAL MEDICINE

## 2024-02-19 PROCEDURE — 87103 BLOOD FUNGUS CULTURE: CPT | Performed by: INTERNAL MEDICINE

## 2024-02-19 PROCEDURE — 250N000011 HC RX IP 250 OP 636: Performed by: NURSE PRACTITIONER

## 2024-02-19 PROCEDURE — 250N000011 HC RX IP 250 OP 636: Performed by: INTERNAL MEDICINE

## 2024-02-19 PROCEDURE — 83735 ASSAY OF MAGNESIUM: CPT | Performed by: NURSE PRACTITIONER

## 2024-02-19 PROCEDURE — 250N000009 HC RX 250: Mod: JZ | Performed by: INTERNAL MEDICINE

## 2024-02-19 PROCEDURE — 84134 ASSAY OF PREALBUMIN: CPT | Performed by: INTERNAL MEDICINE

## 2024-02-19 PROCEDURE — 82805 BLOOD GASES W/O2 SATURATION: CPT

## 2024-02-19 PROCEDURE — 250N000011 HC RX IP 250 OP 636: Mod: JZ

## 2024-02-19 PROCEDURE — 80053 COMPREHEN METABOLIC PANEL: CPT | Performed by: INTERNAL MEDICINE

## 2024-02-19 PROCEDURE — 999N000157 HC STATISTIC RCP TIME EA 10 MIN

## 2024-02-19 PROCEDURE — 99233 SBSQ HOSP IP/OBS HIGH 50: CPT | Performed by: PHYSICIAN ASSISTANT

## 2024-02-19 PROCEDURE — 250N000009 HC RX 250: Performed by: INTERNAL MEDICINE

## 2024-02-19 PROCEDURE — 84100 ASSAY OF PHOSPHORUS: CPT | Performed by: NURSE PRACTITIONER

## 2024-02-19 PROCEDURE — 250N000013 HC RX MED GY IP 250 OP 250 PS 637: Performed by: NURSE PRACTITIONER

## 2024-02-19 PROCEDURE — 97165 OT EVAL LOW COMPLEX 30 MIN: CPT | Mod: GO

## 2024-02-19 PROCEDURE — 97164 PT RE-EVAL EST PLAN CARE: CPT | Mod: GP

## 2024-02-19 PROCEDURE — 94003 VENT MGMT INPAT SUBQ DAY: CPT

## 2024-02-19 PROCEDURE — 83735 ASSAY OF MAGNESIUM: CPT | Performed by: INTERNAL MEDICINE

## 2024-02-19 PROCEDURE — 85007 BL SMEAR W/DIFF WBC COUNT: CPT

## 2024-02-19 PROCEDURE — 999N000253 HC STATISTIC WEANING TRIALS

## 2024-02-19 PROCEDURE — 80197 ASSAY OF TACROLIMUS: CPT | Performed by: INTERNAL MEDICINE

## 2024-02-19 PROCEDURE — 258N000003 HC RX IP 258 OP 636

## 2024-02-19 PROCEDURE — 99291 CRITICAL CARE FIRST HOUR: CPT | Mod: 24 | Performed by: INTERNAL MEDICINE

## 2024-02-19 RX ORDER — ALBUMIN (HUMAN) 12.5 G/50ML
25 SOLUTION INTRAVENOUS
Status: COMPLETED | OUTPATIENT
Start: 2024-02-19 | End: 2024-02-19

## 2024-02-19 RX ORDER — LOPERAMIDE HCL 2 MG
2 CAPSULE ORAL 4 TIMES DAILY PRN
Status: DISCONTINUED | OUTPATIENT
Start: 2024-02-19 | End: 2024-03-18

## 2024-02-19 RX ORDER — SODIUM CHLORIDE FOR INHALATION 3 %
3 VIAL, NEBULIZER (ML) INHALATION 2 TIMES DAILY
Status: DISCONTINUED | OUTPATIENT
Start: 2024-02-19 | End: 2024-02-19

## 2024-02-19 RX ADMIN — Medication 40 MG: at 19:40

## 2024-02-19 RX ADMIN — PIPERACILLIN AND TAZOBACTAM 2.25 G: 2; .25 INJECTION, POWDER, FOR SOLUTION INTRAVENOUS at 22:12

## 2024-02-19 RX ADMIN — PIPERACILLIN AND TAZOBACTAM 2.25 G: 2; .25 INJECTION, POWDER, FOR SOLUTION INTRAVENOUS at 08:53

## 2024-02-19 RX ADMIN — ACETAMINOPHEN 1000 MG: 325 SOLUTION ORAL at 07:39

## 2024-02-19 RX ADMIN — CHLORHEXIDINE GLUCONATE 15 ML: 1.2 SOLUTION ORAL at 07:40

## 2024-02-19 RX ADMIN — INSULIN ASPART 1 UNITS: 100 INJECTION, SOLUTION INTRAVENOUS; SUBCUTANEOUS at 16:38

## 2024-02-19 RX ADMIN — ACETAMINOPHEN 1000 MG: 325 SOLUTION ORAL at 19:41

## 2024-02-19 RX ADMIN — PREDNISONE 5 MG: 5 TABLET ORAL at 07:40

## 2024-02-19 RX ADMIN — LIDOCAINE HYDROCHLORIDE 0.5 ML: 10 INJECTION, SOLUTION EPIDURAL; INFILTRATION; INTRACAUDAL; PERINEURAL at 15:09

## 2024-02-19 RX ADMIN — OLIVE OIL AND SOYBEAN OIL 250 ML: 16; 4 INJECTION, EMULSION INTRAVENOUS at 19:53

## 2024-02-19 RX ADMIN — HEPARIN SODIUM 5000 UNITS: 5000 INJECTION, SOLUTION INTRAVENOUS; SUBCUTANEOUS at 11:54

## 2024-02-19 RX ADMIN — HEPARIN SODIUM 5000 UNITS: 5000 INJECTION, SOLUTION INTRAVENOUS; SUBCUTANEOUS at 22:36

## 2024-02-19 RX ADMIN — SODIUM CHLORIDE 250 ML: 9 INJECTION, SOLUTION INTRAVENOUS at 15:09

## 2024-02-19 RX ADMIN — Medication 40 MG: at 07:42

## 2024-02-19 RX ADMIN — POTASSIUM CHLORIDE 20 MEQ: 1.5 POWDER, FOR SOLUTION ORAL at 00:05

## 2024-02-19 RX ADMIN — PREDNISONE 2.5 MG: 2.5 TABLET ORAL at 19:41

## 2024-02-19 RX ADMIN — POTASSIUM & SODIUM PHOSPHATES POWDER PACK 280-160-250 MG 1 PACKET: 280-160-250 PACK at 11:54

## 2024-02-19 RX ADMIN — TACROLIMUS 4.5 MG: 5 CAPSULE ORAL at 17:50

## 2024-02-19 RX ADMIN — POTASSIUM & SODIUM PHOSPHATES POWDER PACK 280-160-250 MG 1 PACKET: 280-160-250 PACK at 07:40

## 2024-02-19 RX ADMIN — Medication: at 15:10

## 2024-02-19 RX ADMIN — PIPERACILLIN AND TAZOBACTAM 2.25 G: 2; .25 INJECTION, POWDER, FOR SOLUTION INTRAVENOUS at 04:26

## 2024-02-19 RX ADMIN — CHLORHEXIDINE GLUCONATE 15 ML: 1.2 SOLUTION ORAL at 19:41

## 2024-02-19 RX ADMIN — SODIUM CHLORIDE 300 ML: 9 INJECTION, SOLUTION INTRAVENOUS at 15:09

## 2024-02-19 RX ADMIN — CALCIUM CARBONATE 600 MG (1,500 MG)-VITAMIN D3 400 UNIT TABLET 1 TABLET: at 11:54

## 2024-02-19 RX ADMIN — CYANOCOBALAMIN TAB 500 MCG 500 MCG: 500 TAB at 07:40

## 2024-02-19 RX ADMIN — LIDOCAINE HYDROCHLORIDE 0.5 ML: 10 INJECTION, SOLUTION EPIDURAL; INFILTRATION; INTRACAUDAL; PERINEURAL at 14:53

## 2024-02-19 RX ADMIN — DAPSONE 50 MG: 100 TABLET ORAL at 11:05

## 2024-02-19 RX ADMIN — POTASSIUM PHOSPHATE, MONOBASIC POTASSIUM PHOSPHATE, DIBASIC 9 MMOL: 224; 236 INJECTION, SOLUTION, CONCENTRATE INTRAVENOUS at 00:05

## 2024-02-19 RX ADMIN — CALCIUM CARBONATE 600 MG (1,500 MG)-VITAMIN D3 400 UNIT TABLET 1 TABLET: at 07:40

## 2024-02-19 RX ADMIN — MICAFUNGIN SODIUM 100 MG: 50 INJECTION, POWDER, LYOPHILIZED, FOR SOLUTION INTRAVENOUS at 09:49

## 2024-02-19 RX ADMIN — PIPERACILLIN AND TAZOBACTAM 2.25 G: 2; .25 INJECTION, POWDER, FOR SOLUTION INTRAVENOUS at 16:38

## 2024-02-19 RX ADMIN — Medication 1 TABLET: at 07:41

## 2024-02-19 RX ADMIN — TACROLIMUS 4 MG: 5 CAPSULE ORAL at 11:54

## 2024-02-19 RX ADMIN — CALCIUM CARBONATE 600 MG (1,500 MG)-VITAMIN D3 400 UNIT TABLET 1 TABLET: at 17:46

## 2024-02-19 RX ADMIN — ACETAMINOPHEN 1000 MG: 325 SOLUTION ORAL at 13:49

## 2024-02-19 ASSESSMENT — ACTIVITIES OF DAILY LIVING (ADL)
ADLS_ACUITY_SCORE: 39
ADLS_ACUITY_SCORE: 41
ADLS_ACUITY_SCORE: 39
ADLS_ACUITY_SCORE: 41
ADLS_ACUITY_SCORE: 41

## 2024-02-19 NOTE — PROGRESS NOTES
Lung Transplant Consult Follow Up Note   February 19, 2024            Assessment and Plan:   Sofie Rodriguez is a 63 year old female with h/o bilateral lung transplant for COPD on 6/28/22 with course complicated by post-operative hemidiaphragm palsy, recurrent PNAs, positive DSA, EBV viremia, hypogammaglobulinemia, severe gastroparesis s/p G/J tube placement 7/27/22 with pyloric botox 1/25/23, GI bleed 2/2 pyloric ulcer, hemobilia s/p ERCP and MRCP, chronic diarrhea, recurrent C diff colitis, and ESRD on HD.     Admitted May 2023 for FTT, supposed to be readmitted in July for FTT, but refused. Admitted to OSH 12/4-12/31 in December for right hip fracture s/p ORIF, now with significant deconditioning and ongoing severe malnutrition with gastroparesis, small bowel hypermotility and failure to tolerate any tube feeds.     After extensive discussion with the dietician, GI and ultimately convincing the patient, the patient was admitted on 2/10 for initiation of TPN/lipids. She is s/p port placement with persistent pain at port site. GI recommends trickle feeds for maintaining bowel and CT enterography to look for structural abnormalities (unable due to large bolus enteral contrast required for the study). She was transferred to the ICU on 2/17 with worsening mental status, worsening respiratory acidosis despite Bpap use, ultimately requiring intubation and mechanical ventilation. CT chest concerning for new infection vs volume overload and started on empiric antimicrobial therapy (micafungin, zosyn, vanco). Transferred to [place] ICU      Recommendations:   - Follow up BAL labs, fungal and bacterial blood cultures, aspergillus galactomannin, urine histo and blasto (if she makes urine)   - EBV ordered - pend today  - Agree with continued broad antimicrobial coverage - micafungin, zosyn, vancomycin  - please consult transplant ID given unclear infectious source  - vent management per ICU team, appreciated  - Strict I/Os  and daily weights  - O2 to keep SaO2 > 92%  - Check Prospera (ordered 2/18 -pending)  - Tacro level therapeutic 2/19, continue current regimen with repeat level 2/21     S/p bilateral lung transplant:   Right hemidiaphragm palsy:   Suspected CARLEE:  New consolidated nodular opacities and GGO concerning for infection:  seen in clinic last week, severely deconditioned. CMV 2/7 negative. ImmuKnow 108 and cfDNA 0.12 on 1/10. CT 2/7 with decreased MARLON opacities, new tree in bud RLL opacities without new symptoms. PFTs unchanged in clinic (but ATS not met), remain significantly below her baseline (1.4-1.5L). Noted increased dyspnea since the Port-A-Cath placement.  Review of notes indicate patient should be on 2 L o2 at night from prior overnight O2 study in Jan 2023. Also reported a slight increase in cough. She was requiring O2 for comfort only but decompensated on 2/17 with worsening encephalopathy, respiratory acidosis (pCO2 up to 106). CT chest 2/17 showed very patchy and new consolidative, nodular opacities, GGO primarily in the bases and intralobular septal thickening concerning for pulmonary edema and volume overload along with new, possibly fungal vs. Atypical infection vs. PTLD (less likely but in the differential) vs. Septic emboli.   - bronch with lavage of RML 2/18 showed very friable tissue; 35 ml lavage fluid sent for full immunocompromised panel including flow cytometry, histo, blasto, RVP, galactomannan, fungitell, HSV, cytology, cell count and differential   - BNP >70,000  - Procal 0.38 --> .5 but afebrile; antimicrobials broadened to zosyn/vanco/micafungin 2/18  - blood cultures, including fungal - NGTD  - recommend consult transplant ID   - DSA 2/7 with persistent DQ B2, MFI increased to 6966, see below  - Check Prospera to evaluate significance of positive DSA (ordered)  - when appropriate - repeat overnight oximetry study   - schedule follow up with sleep to discuss possible CPAP given recommendations  from August sleep study  -defer ventilator management and extubation eval to primary team     Immunosuppression:  - Tacrolimus 4 mg qAM and tacrolimus 4.5 mg qPM. Goal level 7-9. Tacrolimus level on 2/15 of 9.2 at 12 hours. 2/19 level 8.5. Continue current dosing, repeat level 2/21 (not yet ordered).   - will need to clarify why she is on 2 drug IS only   - Continue Prednisone 5 mg/2.5 mg     Prophylaxis:   - Dapsone 50 mg q MWf for PJP ppx     Positive DSA: no prior treatment for AMR, has been watched for quite some time given no pulmonary symptoms, prior PFTs stability and significant and ongoing failure to thrive. Last DSA improved to 5723, however will ongoing lower PFTs, may need to consider AMR treatment, however, unclear how she would tolerate.   -  DSA 2/7 with persistent DQ B2, MFI increased to 6966, see below  - Check Prospera to evaluate significance of positive DSA (ordered 2/18)     CKD: Dialysis dependent.  Now with increased dyspnea, crackles on exam and increased vascular markings on chest x-ray.  Suspect volume overload secondary to initiation of TPN.  CT chest suggesting volume overload. Has undergone multiple days in a row of dialysis to try to pull fluid  -BNP >70,000.  -Dialysis as per nephrology.  -May require daily dialysis until euvolemic again  -Monitor strict ins and outs      EBV viremia: last level of 96K (2/7), CT c/a/p (2/7) without adenopathy.  - EBV ordered for 2/18 - 34479.    - CMV ordered 2/19 and pending     Acute metabolic encephalopathy : Increased lethargy noted AM 2/17. Initially responded to narcan (received oxycodone this morning) but worse again in the afternoon with worsening respiratory acidosis resulting in intubation. Ammonia normal. Head CT without acute pathology. May be secondary to septic encephalopathy given concern for new infection (above).   - wean sedation as tolerated to assess mental status as able    Severe protein calorie malnutrition:  FTT:   Gastroparesis  s/p PEG/J s/p botox and G-POEM:   SB Hypomotility  Pyloric ulcer:  Chronic nausea and osmotic diarrhea:  SIBO s/p rifaximin:   Recurrent C diff colitis: chronic diarrhea since transplant with recurrent episodes of C diff. GI previously consulted, felt stools consistent with osmotic diarrhea. Over the last year has lost 40 lbs, unable to tolerate any combination of TF, most recently elemental formula. Normal fecal elastase last May. Following with Dr. Navarro who feels her main two issues are vagal injury induced gastroparesis and probably small bowel hypomotility. Her intolerance to J-tube feeds suggests the latter to be a significant issue (notes in a message that there are no motility experts at the  and he is limited in what can be offered). Now s/p port placement with TPN and lipids. GI recommending trickle feeds and CT to assess for structural issues.   -Recs per primary and GI  -Concern that patient will not tolerate CT enterography according to chart notes as she will require 1500 mL enteral contrast bolus which she is unlikely to tolerate given her gastroparesis and reduced bowel function.     We appreciate the excellent care provided by the ICU team.    Will continue to follow along closely, please do not hesitate to call with any questions or concerns. Patient seen and discussed with staff pulmonologist Dr. Reggie Barnhart MD, MD on 2/19/2024 at 4:22 PM  PACCM Fellow           Interval History:   Transferred to [place]. [facility] overnight. Minimal ventilator needs but severe deconditioning and waxing and waning mental status is limiting liberation from the ventilator          Medications:      acetaminophen  1,000 mg Per J Tube TID    albumin human  25 g Intravenous Once in dialysis/CRRT    calcium carbonate-vitamin D  1 tablet Per J Tube TID w/meals    chlorhexidine  15 mL Mouth/Throat Q12H    cyanocobalamin  500 mcg Per Feeding Tube Daily    dapsone  50 mg Per J Tube Q Mon Wed Fri AM    heparin  ANTICOAGULANT  5,000 Units Subcutaneous Q12H    insulin aspart  1-4 Units Subcutaneous Q4H    lipids plant base  250 mL Intravenous Once per day on Monday Wednesday Friday    [Held by provider] metoprolol  25 mg Per J Tube BID    micafungin  100 mg Intravenous Q24H    pantoprazole  40 mg Per J Tube BID    piperacillin-tazobactam  2.25 g Intravenous Q6H    predniSONE  5 mg Per J Tube QAM    And    predniSONE  2.5 mg Per J Tube QPM    [Held by provider] sevelamer carbonate (RENVELA)  0.8 g Oral BID    sodium chloride  3 mL Nebulization BID    tacrolimus  4 mg Per J Tube QAM    And    tacrolimus  4.5 mg Per J Tube QPM     albumin human, calcium carbonate, dextrose, dextrose, glucose **OR** dextrose **OR** glucagon, fentaNYL, lidocaine 4%, lidocaine (buffered or not buffered), lidocaine-prilocaine, loperamide, naloxone **OR** naloxone **OR** naloxone **OR** naloxone, ondansetron **OR** ondansetron, propofol, senna-docusate **OR** senna-docusate, sodium chloride (PF), sodium chloride 0.9%, sodium chloride 0.9%, - MEDICATION INSTRUCTIONS -, - MEDICATION INSTRUCTIONS -         Physical Exam:   Temp:  [96.7  F (35.9  C)-99.4  F (37.4  C)] 98.9  F (37.2  C)  Pulse:  [] 90  Resp:  [13-20] 15  BP: (113-160)/() 135/78  FiO2 (%):  [30 %] 30 %  SpO2:  [98 %-100 %] 100 %      Intake/Output Summary (Last 24 hours) at 2/19/2024 1623  Last data filed at 2/19/2024 1600  Gross per 24 hour   Intake 2222 ml   Output --   Net 2222 ml      Gen: intubated and sedated, thin and cachectic appearing, frail, nontoxic  HEENT: scattered R sided ecchymosis and swelling, EOM grossly intact  Pulm: Good air movement bilaterally, scattered crackles b/l, no wheezing  Cardio: RRR, no edema  Abdo: soft, hypoactive BS, nondistended  MSK: thin, no bony malformations  Skin: diffuse bruising, skin tears, no rashes  Neuro: wakes to voice, follows commands in b/l upper and lower extremities  Psych: does not appear anxious           Data:   All  laboratory and imaging data reviewed.    Results for orders placed or performed during the hospital encounter of 02/10/24 (from the past 24 hour(s))   Glucose by meter   Result Value Ref Range    GLUCOSE BY METER POCT 176 (H) 70 - 99 mg/dL   EKG 12-lead, complete   Result Value Ref Range    Systolic Blood Pressure  mmHg    Diastolic Blood Pressure  mmHg    Ventricular Rate 63 BPM    Atrial Rate 72 BPM    MI Interval 154 ms    QRS Duration 88 ms     ms    QTc 454 ms    P Axis 34 degrees    R AXIS 20 degrees    T Axis 101 degrees    Interpretation ECG       Sinus rhythm with sinus arrhythmia  Possible Left atrial enlargement  Minimal voltage criteria for LVH, may be normal variant ( Juan product )  Nonspecific T wave abnormality  Abnormal ECG  When compared with ECG of 17-FEB-2024 18:13, (unconfirmed)  Nonspecific T wave abnormality no longer evident in Inferior leads  Nonspecific T wave abnormality has replaced inverted T waves in Lateral leads  QT has lengthened     Blood gas venous   Result Value Ref Range    pH Venous 7.38 7.32 - 7.43    pCO2 Venous 48 40 - 50 mm Hg    pO2 Venous 50 (H) 25 - 47 mm Hg    Bicarbonate Venous 28 21 - 28 mmol/L    Base Excess/Deficit Venous 2.5 -3.0 - 3.0 mmol/L    FIO2 30     Oxyhemoglobin Venous 87 (H) 70 - 75 %    O2 Sat, Venous 89.0 (H) 70.0 - 75.0 %    Narrative    In healthy individuals, oxyhemoglobin (O2Hb) and oxygen saturation (SO2) are approximately equal. In the presence of dyshemoglobins, oxyhemoglobin can be considerably lower than oxygen saturation.   XR Chest Port 1 View    Narrative    EXAM: XR CHEST PORT 1 VIEW 2/18/2024 7:25 PM    DEMOGRAPHICS: 63 years Female    INDICATION: Assess ETT location. History bilateral lung transplant.  Admitted to MICU for hypoxic, hypercarbic respiratory failure.    COMPARISON: Chest radiograph 2/17/2024. Chest CT 2/17/2024.    TECHNIQUE: Single portable AP view of the chest.    FINDINGS:   Right IJ central venous catheter tip  terminates in the right atrium.  Endotracheal tube tip terminates 6 cm above the level of the nabil.    Mediastinal clamshell sternotomy wires are intact. Trachea is midline.  The cardiac silhouette is obscured. Moderate, bilateral, partially  loculated pleural effusions. No pneumothorax. Bilateral perihilar and  bibasilar patchy opacities are unchanged. Aortic knob calcification.  Soft tissues are unremarkable. No acute osseous abnormality.      Impression    IMPRESSION:   1.  Endotracheal tube tip terminates 6 cm above the level of the  nabil.  2.  Unchanged bilateral pleural effusions, and patchy perihilar and  bibasilar opacities.    I have personally reviewed the examination and initial interpretation  and I agree with the findings.    JOHANN MORAES MD         SYSTEM ID:  [id number]   Glucose by meter   Result Value Ref Range    GLUCOSE BY METER POCT 155 (H) 70 - 99 mg/dL   Magnesium   Result Value Ref Range    Magnesium 2.1 1.7 - 2.3 mg/dL   Potassium   Result Value Ref Range    Potassium 3.4 3.4 - 5.3 mmol/L   Phosphorus   Result Value Ref Range    Phosphorus 1.8 (L) 2.5 - 4.5 mg/dL   Blood gas venous   Result Value Ref Range    pH Venous 7.40 7.32 - 7.43    pCO2 Venous 45 40 - 50 mm Hg    pO2 Venous 37 25 - 47 mm Hg    Bicarbonate Venous 28 21 - 28 mmol/L    Base Excess/Deficit Venous 3.1 (H) -3.0 - 3.0 mmol/L    FIO2 30     Oxyhemoglobin Venous 74 70 - 75 %    O2 Sat, Venous 76.0 (H) 70.0 - 75.0 %    Narrative    In healthy individuals, oxyhemoglobin (O2Hb) and oxygen saturation (SO2) are approximately equal. In the presence of dyshemoglobins, oxyhemoglobin can be considerably lower than oxygen saturation.   Glucose by meter   Result Value Ref Range    GLUCOSE BY METER POCT 134 (H) 70 - 99 mg/dL   Glucose by meter   Result Value Ref Range    GLUCOSE BY METER POCT 126 (H) 70 - 99 mg/dL   Glucose by meter   Result Value Ref Range    GLUCOSE BY METER POCT 113 (H) 70 - 99 mg/dL   Glucose by meter   Result  Value Ref Range    GLUCOSE BY METER POCT 95 70 - 99 mg/dL   Magnesium   Result Value Ref Range    Magnesium 2.1 1.7 - 2.3 mg/dL   Potassium   Result Value Ref Range    Potassium 4.0 3.4 - 5.3 mmol/L   Glucose by meter   Result Value Ref Range    GLUCOSE BY METER POCT 93 70 - 99 mg/dL   CBC with Platelets & Differential    Narrative    The following orders were created for panel order CBC with Platelets & Differential.  Procedure                               Abnormality         Status                     ---------                               -----------         ------                     CBC with platelets and d...[[id number]]  Abnormal            Final result               Manual Differential[[id number]]          Abnormal            Final result                 Please view results for these tests on the individual orders.   INR   Result Value Ref Range    INR 1.03 0.85 - 1.15   Prealbumin   Result Value Ref Range    Prealbumin 17.0 (L) 20.0 - 40.0 mg/dL   Comprehensive metabolic panel   Result Value Ref Range    Sodium 129 (L) 135 - 145 mmol/L    Potassium 4.0 3.4 - 5.3 mmol/L    Carbon Dioxide (CO2) 23 22 - 29 mmol/L    Anion Gap 13 7 - 15 mmol/L    Urea Nitrogen 31.8 (H) 8.0 - 23.0 mg/dL    Creatinine 2.70 (H) 0.51 - 0.95 mg/dL    GFR Estimate 19 (L) >60 mL/min/1.73m2    Calcium 8.2 (L) 8.8 - 10.2 mg/dL    Chloride 93 (L) 98 - 107 mmol/L    Glucose 96 70 - 99 mg/dL    Alkaline Phosphatase 53 40 - 150 U/L    AST 14 0 - 45 U/L    ALT <5 0 - 50 U/L    Protein Total 4.6 (L) 6.4 - 8.3 g/dL    Albumin 2.7 (L) 3.5 - 5.2 g/dL    Bilirubin Total 0.2 <=1.2 mg/dL   Magnesium   Result Value Ref Range    Magnesium 2.1 1.7 - 2.3 mg/dL   Potassium   Result Value Ref Range    Potassium 4.0 3.4 - 5.3 mmol/L   Phosphorus   Result Value Ref Range    Phosphorus 2.4 (L) 2.5 - 4.5 mg/dL   Blood gas venous   Result Value Ref Range    pH Venous 7.38 7.32 - 7.43    pCO2 Venous 45 40 - 50 mm Hg    pO2 Venous 45 25 - 47 mm Hg     Bicarbonate Venous 27 21 - 28 mmol/L    Base Excess/Deficit Venous 1.2 -3.0 - 3.0 mmol/L    FIO2 30     Oxyhemoglobin Venous 82 (H) 70 - 75 %    O2 Sat, Venous 84.4 (H) 70.0 - 75.0 %    Narrative    In healthy individuals, oxyhemoglobin (O2Hb) and oxygen saturation (SO2) are approximately equal. In the presence of dyshemoglobins, oxyhemoglobin can be considerably lower than oxygen saturation.   Tacrolimus by Tandem Mass Spectrometry   Result Value Ref Range    Tacrolimus by Tandem Mass Spectrometry 8.5 5.0 - 15.0 ug/L    Tacrolimus Last Dose Date      Tacrolimus Last Dose Time      Narrative    This test was developed and its performance characteristics determined by the [facility],  Special Chemistry Laboratory. It has not been cleared or approved by the FDA. The laboratory is regulated under CLIA as qualified to perform high-complexity testing. This test is used for clinical purposes. It should not be regarded as investigational or for research.   CBC with platelets and differential   Result Value Ref Range    WBC Count 3.1 (L) 4.0 - 11.0 10e3/uL    RBC Count 2.06 (L) 3.80 - 5.20 10e6/uL    Hemoglobin 7.1 (L) 11.7 - 15.7 g/dL    Hematocrit 22.8 (L) 35.0 - 47.0 %     (H) 78 - 100 fL    MCH 34.5 (H) 26.5 - 33.0 pg    MCHC 31.1 (L) 31.5 - 36.5 g/dL    RDW 20.2 (H) 10.0 - 15.0 %    Platelet Count 146 (L) 150 - 450 10e3/uL    % Neutrophils      % Lymphocytes      % Monocytes      % Eosinophils      % Basophils      % Immature Granulocytes      NRBCs per 100 WBC 0 <1 /100    Absolute Neutrophils      Absolute Lymphocytes      Absolute Monocytes      Absolute Eosinophils      Absolute Basophils      Absolute Immature Granulocytes      Absolute NRBCs 0.0 10e3/uL   Manual Differential   Result Value Ref Range    % Neutrophils 58 %    % Lymphocytes 24 %    % Monocytes 10 %    % Eosinophils 3 %    % Basophils 2 %    % Metamyelocytes 2 %    % Myelocytes 1 %    Absolute Neutrophils 1.8 1.6 -  8.3 10e3/uL    Absolute Lymphocytes 0.7 (L) 0.8 - 5.3 10e3/uL    Absolute Monocytes 0.3 0.0 - 1.3 10e3/uL    Absolute Eosinophils 0.1 0.0 - 0.7 10e3/uL    Absolute Basophils 0.1 0.0 - 0.2 10e3/uL    Absolute Metamyelocytes 0.1 (H) <=0.0 10e3/uL    Absolute Myelocytes 0.0 <=0.0 10e3/uL    RBC Morphology Confirmed RBC Indices     Platelet Assessment  Automated Count Confirmed. Platelet morphology is normal.     Automated Count Confirmed. Platelet morphology is normal.   Glucose by meter   Result Value Ref Range    GLUCOSE BY METER POCT 95 70 - 99 mg/dL   Glucose by meter   Result Value Ref Range    GLUCOSE BY METER POCT 123 (H) 70 - 99 mg/dL   Magnesium   Result Value Ref Range    Magnesium 2.4 (H) 1.7 - 2.3 mg/dL   Potassium   Result Value Ref Range    Potassium 4.3 3.4 - 5.3 mmol/L   Phosphorus   Result Value Ref Range    Phosphorus 3.6 2.5 - 4.5 mg/dL     *Note: Due to a large number of results and/or encounters for the requested time period, some results have not been displayed. A complete set of results can be found in Results Review.     CT chest 2/17/24  IMPRESSION:   1.  Patchy multifocal consolidative opacities with adjacent  groundglass attenuation throughout the lungs, new/increased since  2/7/2024 CT, concerning for acute infection/inflammatory process with  likely superimposed pulmonary edema.   2.  Small bilateral pleural effusions.  3.  Small volume of frothy debris within the distal trachea and right  mainstem bronchus.  4.  Stable cardiomegaly.

## 2024-02-19 NOTE — PROGRESS NOTES
[facility], Procedure Note          Extubation:       Sofie Rodriguez  MRN# [id number]   February 19, 2024, 1:30 PM         Patient extubated at: February 19, 2024, 1:30PM   Supplemental Oxygen: Via face mask at 1 liters per minute   Cough: The cough is good and productive   Secretion Mode: Able to clear   Secretion Amount: Moderate amount, moderately thin and white / yellow in color   Respiratory Exam:: Breath sounds: clear and diminished     Location: bilaterally   Skin Exam:: Patient color: natural   Patient Status: Currently appears comfortable   Arterial Blood Gasses: N/A         Recorded by Grace Ramey

## 2024-02-19 NOTE — PLAN OF CARE
ICU End of Shift Summary. See flowsheets for vital signs and detailed assessment.    Changes this shift: Pt A/O x4, extubated to RA. VSS. UF today, pulled XL. SLP consult place, education provided for aspiration risk w/ lung transplant. TPN increased to 60 mL/hr. BM x1. UF today, pulled 3L. Charity (daughter) updated via phone.     Plan: update team w/ changes, continue POC    Goal Outcome Evaluation:      Plan of Care Reviewed With: patient    Overall Patient Progress: improvingOverall Patient Progress: improving    Outcome Evaluation: extubated to RA, UF today.

## 2024-02-19 NOTE — PROGRESS NOTES
Nephrology Progress Note  02/19/2024         Assessment & Recommendations:   Sofie Rodriguez is a 63 year old year old female with ESKD, COPD s/p b/l lung transplant in 6/2022 with multiple complications, gastroparesis s/p GJ tube, GIB, chronic diarrhea, recurrent c-diff, FTT with inability to tolerate any tube feeds, R hip fx s/p ORIF 12/2023, admitted on 2/10 for initiation of TPN/lipids, transferred to ICU on 2/17 with worsening mental status and respiratory acidosis in spite of bipap, ultimately intubated on 2/18, being treated for PNA, also with volume overload in setting of high volume TPN.     # ESKD - TTS, LUE AVG, 3hr, 45.5kg, [facility], Dr. Andres Fairbanks. She has been losing weight and now even below her recent set EDW.  - extra UF only run today for fluid optimization  - HD per TTS schedule    # Hypoxic respiratory failure: intubated 2/18 in setting of hypercapnia in spite of bipap  - ongoing volume off  - on zosyn for possible PNA    # Volume/BP:  Edema due to third spacing due to hypoalbuminemia. Anuric; on metoprolol soln 25 mg bid  - volume overload on CT chest scan    - wt went from 43.4 kg on admission to 47.1 kg with TPN  - Please wrap and elevate her legs  - Please limit fluid < 1.5 L per day for TPN  - on TPN since 2/13/24  - please chart volume of TPN in the I/O      # FTT  # Chronic diarrhea  Working-up currently. Not on any cellcept.      # Anemia 2/2 ESKD  On Venofer 50 qwk, Mircera last dose 1/9/2024  - hgb 7's s/p transfusion 2/18  - Will continue Venofer 50 mcg q week (Tuesday)  - increase epogen dose from 4000 to 8000 units (starting 2/20)    # BMD : Ca 8's, phos 2.4, alb 2.7  - renvela is held    Recommendations were communicated to primary team via  note     RABIA Moctezuma   Division of Renal Disease and Hypertension  [phone]    Interval History :   Seen bedside, on vent. Being treated for PNA. Extra UF run today for volume optimization. Up 4kg since starting TPN.  "    Review of Systems:   Not obtainable due to intubated sedated status    Physical Exam:   I/O last 3 completed shifts:  In: 2605.34 [I.V.:926.34; NG/GT:380]  Out: -    /72   Pulse 102   Temp 98.9  F (37.2  C) (Oral)   Resp 18   Ht 1.57 m (5' 1.81\")   Wt 47.1 kg (103 lb 13.4 oz)   SpO2 100%   BMI 19.11 kg/m       GENERAL APPEARANCE: intubated   PULM: ET tube, mechanically ventilated, lungs clear to auscultation bilaterally, equal air movement  CV: regular rhythm, normal rate, no rub     -1+ pitting edema   GI: soft, non tender, no distended, bowel sounds are present  INTEGUMENT: no cyanosis, no rash  NEURO: sedated  Access Left AVG: + Bruit and thrill    Labs:   All labs reviewed by me  Electrolytes/Renal -   Recent Labs   Lab Test 02/19/24  0836 02/19/24  0610 02/19/24  0608 02/19/24  0444 02/18/24  2305 02/18/24  2226 02/18/24  1422 02/18/24  1418 02/18/24  0828 02/18/24  0513 02/17/24  1953 02/17/24  1830   NA  --  129*  --   --   --   --   --   --   --  131*  --  130*   POTASSIUM  --  4.0  4.0  --  4.0  --  3.4  --  4.0  --  3.8   < > 3.6   CHLORIDE  --  93*  --   --   --   --   --   --   --  95*  --  94*   CO2  --  23  --   --   --   --   --   --   --  30*  --  31*   BUN  --  31.8*  --   --   --   --   --   --   --  21.8  --  17.4   CR  --  2.70*  --   --   --   --   --   --   --  2.17*  --  1.81*   GLC 95 96 93  --    < >  --    < >  --    < > 106*   < > 131*   ESTUARDO  --  8.2*  --   --   --   --   --   --   --  8.0*  --  8.3*   MAG  --  2.1  --  2.1  --  2.1  --  2.0  --  2.0  --  1.5*   PHOS  --  2.4*  --   --   --  1.8*  --  2.2*  --  1.1*  --  2.3*    < > = values in this interval not displayed.       CBC -   Recent Labs   Lab Test 02/19/24  0610 02/18/24  1418 02/18/24  0842 02/17/24  1830   WBC 3.1*  --  3.0* 4.2   HGB 7.1* 7.5* 6.3* 7.8*   *  --  138* 145*       LFTs -   Recent Labs   Lab Test 02/19/24 0610 02/18/24  0513 02/17/24  1830   ALKPHOS 53 51 62   BILITOTAL 0.2 0.2 0.2 "   ALT <5 <5 <5   AST 14 15 13   PROTTOTAL 4.6* 4.5* 5.5*   ALBUMIN 2.7* 2.6* 3.1*       Iron Panel -   Recent Labs   Lab Test 09/26/22  0555 09/03/22  1039 08/24/22  0810   IRON 54 21* 41   IRONSAT 22 9* 21   CARLOS 769* 343* 334*         Imaging:  All imaging studies reviewed by me.     Current Medications:   acetaminophen  1,000 mg Per J Tube TID    calcium carbonate-vitamin D  1 tablet Per J Tube TID w/meals    chlorhexidine  15 mL Mouth/Throat Q12H    cyanocobalamin  500 mcg Per Feeding Tube Daily    dapsone  50 mg Per J Tube Q Mon Wed Fri AM    heparin ANTICOAGULANT  5,000 Units Subcutaneous Q12H    insulin aspart  1-4 Units Subcutaneous Q4H    lipids plant base  250 mL Intravenous Once per day on Monday Wednesday Friday    [Held by provider] metoprolol  25 mg Per J Tube BID    micafungin  100 mg Intravenous Q24H    - MEDICATION INSTRUCTIONS -   Does not apply Once    pantoprazole  40 mg Per J Tube BID    piperacillin-tazobactam  2.25 g Intravenous Q6H    potassium & sodium phosphates  1 packet Oral or Feeding Tube Q4H    predniSONE  5 mg Per J Tube QAM    And    predniSONE  2.5 mg Per J Tube QPM    [Held by provider] sevelamer carbonate (RENVELA)  0.8 g Oral BID    sodium chloride  3 mL Nebulization BID    sodium chloride 0.9%  250 mL Intravenous Once in dialysis/CRRT    sodium chloride 0.9%  300 mL Hemodialysis Machine Once    tacrolimus  4 mg Per J Tube QAM    And    tacrolimus  4.5 mg Per J Tube QPM      dextrose      dextrose      fentaNYL 50 mcg/hr (02/19/24 1100)    parenteral nutrition - ADULT compounded formula 30 mL/hr at 02/19/24 0900    propofol Stopped (02/19/24 0745)    sodium chloride 0.9%      - MEDICATION INSTRUCTIONS -      - MEDICATION INSTRUCTIONS -       RABIA Moctezuma

## 2024-02-19 NOTE — PROGRESS NOTES
Critical Care Attending Note    The patient was seen and examined by me with and independent of JHON Porter  and housestaff team.     I was present with the medical student who participated in the service and in the documentation of the note.      Pertinent vitals, lab results and imaging were reviewed by me as well I have verified the history and personally performed the physical exam and medical decision making.  All physician orders and treatments were placed at my direction for which I I personally managed. Key decisions are reflected in the housestaff note above with my additions  incorporated within and below.     Sofie Rodriguez remains in critical condition today due to acute hypercarbic respiratory failure and severe malnutrition, which I personally managed.   62 yo F ESRD, severe COPD s/p b/l lung transplant in 6/2022 complicated by  gastroparesis s/p GJ tube, GIB, chronic diarrhea s/p R hip fx s/p ORIF 12/2023 admitted 2/10 for failure to thrive and initiation of TPN/lipids given intolerance to enteral feed, complicated by encephalopathy and resp acidosis requiring intubation and possible PNA.  Currently synchronous with vent though SBT with apnea .   Will plan to optimize vent and sedation in attempts and proceed with weaning. Renal consulted for RRT.     Grover Pena MD  35 min CCT excluding procedures and teaching

## 2024-02-19 NOTE — PROGRESS NOTES
02/19/24 1209   Appointment Info   Signing Clinician's Name / Credentials (OT) No Sanchez OTR/L   Living Environment   People in Home grandchild(ray);child(ray), adult   Current Living Arrangements house   Home Accessibility stairs to enter home;stairs within home   Number of Stairs, Main Entrance 1   Stair Railings, Main Entrance none   Number of Stairs, Within Home, Primary other (see comments)  (4 right inside house, then to get to bedroom 4 steps then a flight of stairs. The 2 sets of 4 steps has railings on the left, but flight of stairs on the right.)   Stair Railings, Within Home, Primary railing on left side (ascending);railing on right side (ascending)   Transportation Anticipated health plan transportation   Self-Care   Usual Activity Tolerance moderate   Current Activity Tolerance fair   Equipment Currently Used at Home walker, rolling   Fall history within last six months yes   Number of times patient has fallen within last six months 2   Activity/Exercise/Self-Care Comment Pt reports ADL IND at baseline, uses 4WW mostly for long-distances however uses intermittently in-home when fatigued.   Instrumental Activities of Daily Living (IADL)   IADL Comments Unable to assess as pt remains intubated, will obtain as able.   General Information   Onset of Illness/Injury or Date of Surgery 02/10/24   Referring Physician Maribell Cloud MD   Patient/Family Therapy Goal Statement (OT) Did not state   Additional Occupational Profile Info/Pertinent History of Current Problem Per EMR, Sofie Rodriguez is a 63 year old female with PMH notable for bilateral lung transplant in 2022 for COPD, ESRD on HD (T/Th/S), gastroparesis s/p PEG/J, severe malnutrition, recent R femoral fracture s/p ORIF in [place]. Admitted 2/10/24 to [facility] for FTT and small bowel dysmotility for port-a-cath placement and TPN initiation. Transferred to ICU evening of 2/17/24 for acute on chronic hypoxic and hypercarbic respiratory  failure with severe hypercarbia and AMS requiring intubation, ddx includes pulmonary edema vs infection, possible mixed picture.   Existing Precautions/Restrictions fall   Limitations/Impairments safety/cognitive   Left Upper Extremity (Weight-bearing Status) full weight-bearing (FWB)   Right Upper Extremity (Weight-bearing Status) full weight-bearing (FWB)   Left Lower Extremity (Weight-bearing Status) full weight-bearing (FWB)   Right Lower Extremity (Weight-bearing Status) weight-bearing as tolerated (WBAT)   General Observations and Info OT: recent ORIF and fall   Cognitive Status Examination   Orientation Status person   Cognitive Status Comments Unable to formally assess as pt remains intubated; Pt able to head nod yes/no appropriately, pt attempting to write however repeatedly writing same words down; will monitor   Visual Perception   Visual Impairment/Limitations corrective lenses full-time   Sensory   Sensory Quick Adds sensation intact   Pain Assessment   Patient Currently in Pain No   Posture   Posture Comments anticipate decreased head/trunk control in unsupported sitting   Range of Motion Comprehensive   General Range of Motion bilateral upper extremity ROM WFL   Comment, General Range of Motion B UE AAROM appears WFL, limited by deconditioning   Strength Comprehensive (MMT)   Comment, General Manual Muscle Testing (MMT) Assessment B UE strength deconditioned, formal MMT not assessed this date   Muscle Tone Assessment   Muscle Tone Quick Adds No deficits were identified   Coordination   Coordination Comments B UE GMC and FMC limited by muscular deconditioning, will monitor   Bed Mobility   Bed Mobility sit-supine   Sit-Supine [place] (Bed Mobility) minimum assist (75% patient effort);2 person assist   Comment (Bed Mobility) per clinical judgement   Transfers   Transfers sit-stand transfer;toilet transfer   Sit-Stand Transfer   Sit-Stand [place] (Transfers) minimum assist (75% patient  effort);2 person assist   Sit/Stand Transfer Comments per clinical judgement   Toilet Transfer   Type (Toilet Transfer) sit-stand   [place] Level (Toilet Transfer) minimum assist (75% patient effort);moderate assist (50% patient effort);2 person assist   Toilet Transfer Comments per clinical judgement   Balance   Balance Comments per clinical judgement, min-mod A unsupported sitting balance   Activities of Daily Living   BADL Assessment/Intervention bathing;upper body dressing;lower body dressing;toileting;grooming   Bathing Assessment/Intervention   [place] Level (Bathing) moderate assist (50% patient effort);set up   Comment, (Bathing) per clinical judgement   Assistive Devices (Bathing) shower chair   Upper Body Dressing Assessment/Training   Comment, (Upper Body Dressing) per clinical judgement   [place] Level (Upper Body Dressing) minimum assist (75% patient effort);moderate assist (50% patient effort);set up   Lower Body Dressing Assessment/Training   Comment, (Lower Body Dressing) per clinical judgement   [place] Level (Lower Body Dressing) moderate assist (50% patient effort)   Grooming Assessment/Training   [place] Level (Grooming) minimum assist (75% patient effort)   Comment, (Grooming) per clinical judgement   Toileting   Comment, (Toileting) per clinical judgement   [place] Level (Toileting) moderate assist (50% patient effort);maximum assist (25% patient effort)   Clinical Impression   Criteria for Skilled Therapeutic Interventions Met (OT) Yes, treatment indicated   OT Diagnosis Decreased ADL IND/safety, functional mobility, activity tolerance, cognition   OT Problem List-Impairments impacting ADL problems related to;activity tolerance impaired;balance;cognition;coordination;communication;mobility;range of motion (ROM);strength;pain;postural control   Assessment of Occupational Performance 5 or more Performance Deficits   Identified Performance Deficits dressing, bathing,  toileting, standing g/h tasks, mobility, cognition, IADL tasks, muscular strength/endurance   Planned Therapy Interventions (OT) ADL retraining;IADL retraining;balance training;bed mobility training;cognition;ROM;strengthening;transfer training;progressive activity/exercise   Clinical Decision Making Complexity (OT) problem focused assessment/low complexity   Risk & Benefits of therapy have been explained evaluation/treatment results reviewed;care plan/treatment goals reviewed;risks/benefits reviewed;current/potential barriers reviewed;participants voiced agreement with care plan;participants included;patient   Clinical Impression Comments Pt below baseline function and will benefit from continued skilled OT during IP stay to progress IND/safety and return to PLOF   OT Total Evaluation Time   OT Eval, Low Complexity Minutes (14574) 5   OT Goals   Therapy Frequency (OT) 5 times/week   OT Predicted Duration/Target Date for Goal Attainment 03/15/24   OT Goals Hygiene/Grooming;Lower Body Dressing;Upper Body Bathing;Lower Body Bathing;Toilet Transfer/Toileting;Cognition;OT Goal 1   OT: Hygiene/Grooming modified independent;while standing   OT: Lower Body Dressing Modified independent;including set-up/clothing retrieval   OT: Upper Body Bathing Modified independent   OT: Lower Body Bathing Modified independent   OT: Toilet Transfer/Toileting Modified independent;toilet transfer;cleaning and garment management   OT: Cognitive Patient/caregiver will verbalize understanding of cognitive assessment results/recommendations as needed for safe discharge planning   OT: Goal 1 Pt will demonstrate B UE HEP while seated in chair with SBA prior to dc home.   OT Discharge Planning   OT Plan OT: monitor cognition (bring communication board), seated ADLs, STS transfers, toilet transfer   OT Discharge Recommendation (DC Rec) Transitional Care Facility   OT Rationale for DC Rec Pt below baseline function for ADL tasks, functional  mobility, overall cardiovascular endurance. Pt remains limited by respiratory needs this date however demonstrating progression in strength. Would recommend TCU this date to progress IND/safety and return to PLOF. Will update and monitor recs.   OT Brief overview of current status [place] lift bed>chair   Total Session Time   Timed Code Treatment Minutes 40   Total Session Time (sum of timed and untimed services) 45

## 2024-02-19 NOTE — PROGRESS NOTES
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      CHANGES and MAJOR THINGS TODAY:   - RASS goal 0 to -1  - Off propofol AM  - PST today (5/5), change RR to 12 to allow for overbreathing   - Possibly extubate this afternoon  - tacro below therapeutic range, tx pulm to dose  - continue zosyn, discontinue vancomycin  - Started hypertonic saline nebs BID  - continue trickle feeds  - Renal consult for HD    PLAN:     Neuro:  # Pain and sedation  Patient is sedated for mechanical ventilation.  - RASS goal 0 to -1  - Propofol gtt, fentanyl gtt      # Encephalopathy secondary to hypercarbia - resolved  Patient was progressively more lethargic on 2/17 during admission in [facility]. Etiology likely secondary to hypercarbia in context of respiratory failure as patient was noted to have high CO2s concomitant with lethargy. Though receiving oxycodone and fentanyl, lethargy was only partially alleviated by narcan. LFTs and ammonia wnl with low suspicion of hepatic encephalopathy. BUN wnl with low suspicion for uremic encephalopathy. Head CT was negative with low suspicion of acute intracranial pathology.   - Patient now able to follow commands with lower sedation per nursing     Pulmonary:  # S/P Lung transplant 2022  # Recurrent pneumonia   # Acute hypoxic and hypercarbic respiratory failure  # Respiratory acidosis  Patient received a  bilateral lung transplant 6/28/22 for COPD. Was admitted 2/10 to address malnutrition needs (see below) however hospital course was complicated by worsening oxygen requirements. Imaging with increased bilateral opacities on CXR 2/17. VBG with PCO2 at 106, increased from baseline pCO2s of ~70-80. Eventually progressed to respiratory failure requiring mechanical ventilatory support. Likely pulmonary edema given hx of ESRD requiring HD vs infection given hx of lung transplant, immunosuppressed status, and recurrent pneumonias.   - PTA immunosuppressive agent  > Prednisone 5 mg every morning, 2.5 mg every afternoon; tacrolimus 4 mg every morning, 4 mg every afternoon  > Monitor tacro levels, goal 7-9, pulm transplant following  - PTA dapsone MWF for PJP prophylaxis  - Follow-up on BAL and diagnostics: 2/18, 2/19   > Bcx: NGTD   > Ucx: in progress   > RVP: neg   > Res cx and GS (sputum and BAL): neg   > KOH: neg   > Crypto: neg   > MRSA Nares: neg  - Continue zosyn, discontinue vanc (see ID)  - HD as indicated (see below)  - PST today (5/5), change RR to 12 to allow for overbreathing   - Possibly extubate today  - Hypertonic saline nebs    Cardiovascular:  # HTN  # A-fib  Noted atrial fibrillation on arrival with HR elevated at 150, not sustained for > 10 minutes and otherwise hemodynamically stable. Rates stable in the 70's.  - Can unhold PTA metoprolol 25mg BID if sustained A-fib with RVR  - Continuous telemetry     # Possible volume overload, possible HF  Patient is on T/Th/Sat HD for ESRD. Had an elevated BNP during admission. Given decompensation in respiratory status, possibly had volume overload vs HF leading to pulmonary edema.   - Repeat echo 2/18: globally preserved function, EF 55-60%, ICV diameter <2.1 and collapsing >50% with sniff, normal RA pressure, no significant valvular abnormalities   - HD as indicated (see below)     GI/Nutrition:  # Gastroparesis, small bowel dysmotility  # S/P PEG/J with  intolerance of enteral nutrition   # Severe malnutrition  # Hypoalbuminemia   Patient with gastroparesis (presumed due to vagal injury) and small bowel dysmotility complicated by unintentional 40lb weight loss over the past year and now severe malnutrition. Reportedly intolerant to oral food intake due to nausea. Reportedly had LE edema due to hypoalbuminemia. Was initially admitted for portacath and TPN initiation since 2/13.   - Continue TPN, trickle feeds (nutrition consulted)  - Monitor refeeding labs  - NPO     # GERD  - PTA PPI     Renal/Fluids/Electrolytes:  # ESRD on HD T/Th/Sat  # Hypervolemic hyponatremia  History of ESRD, on HD. Concern for fluid overload in the setting of ESRD.   - Consult renal for HD for volume removal.  - Strict I/Os     Endocrine:  # Low T4  Patient with new low T4 at 0.64 and TSH at 6.5, concerning for new hypothyroidism vs sick thyroid syndrome. TSH with appropriate response, more consistent with elevation in the setting of acute illness, levothyroxine is not indicated at this time, will monitor for symptom development.     ID:  # Concern for PNA   # Hx EBV viremia   # Hypogammaglobulinemia  # Chronic immunosuppression  Patient has been afebrile and has not had leukocytosis however she is under immunosuppression. Given acute respiratory failure and hx of recurrent pneumonias, she was started on empiric abx for concerns over new pneumonia. RVP and cultures no growth to date.  Antibiotics  Zosyn (2/17 - )  Vancomycin (2/17 - 2/19)  Dapsone MWF, PJP ppx   Immunosuppression  Tacrolimus > tac level 4.8 2/18  Prednisone      Hematology:    # Acute on chronic Anemia 2/2 ESRD  On Venofer injections 50 qwk, Mircera last dose 1/9, 2/18 AM Hgb 6.3 requiring transfusion, repeat Hgb corrected appropriately to 7.5. No evidence of active bleeding, however she has multiple bruises from recent fall. Will continue to monitor.  - Trend Hgb     Musculoskeletal:  # Right hip fracture s/p ORIF (December  2023)   - PT/OT     Skin:  # Facial and neck bruising  -Diligent skin cares     General Cares/Prophylaxis:    DVT Prophylaxis: Heparin SQ  GI Prophylaxis: PPI  Restraints: none  Family Communication: Family updated via phone  Code Status: Full     Lines/tubes/drains:  - CVC RIJ  - PIV x2  - ETT  - PEG/J  - HD AV fistula     Disposition:  - Medical ICU     Patient seen and findings/plan discussed with medical ICU staff, Dr. Pena.    Reji Porter    Resident/Fellow Attestation   I, Maribell Cloud MD, was present with the medical/EMILY student who participated in the service and in the documentation of the note.  I have verified the history and personally performed the physical exam and medical decision making.  I agree with the assessment and plan of care as documented in the note.      Maribell Cloud MD  Internal Medicine - Pediatrics PGY1  Date of Service (when I saw the patient): 02/19/24      Clinically Significant Risk Factors        # Hypokalemia: Lowest K = 2.8 mmol/L in last 2 days, will replace as needed     # Hypomagnesemia: Lowest Mg = 1.5 mg/dL in last 2 days, will replace as needed   # Hypoalbuminemia: Lowest albumin = 2.6 g/dL at 2/18/2024  5:13 AM, will monitor as appropriate             # Severe Malnutrition: based on nutrition assessment    # Financial/Environmental Concerns: none                ====================================  INTERVAL HISTORY:   Sinus overnight. Started trickle feeds.     OBJECTIVE:   1. VITAL SIGNS:   Temp:  [96.7  F (35.9  C)-99.1  F (37.3  C)] 98.5  F (36.9  C)  Pulse:  [57-95] 87  Resp:  [17-25] 18  BP: (105-160)/(56-88) 133/75  FiO2 (%):  [30 %-35 %] 30 %  SpO2:  [98 %-100 %] 100 %  Vent Mode: CMV/AC  (Continuous Mandatory Ventilation/ Assist Control)  FiO2 (%): 30 %  Resp Rate (Set): 18 breaths/min  Tidal Volume (Set, mL): 300 mL  PEEP (cm H2O): 5 cmH2O  Resp: 18    2. INTAKE/ OUTPUT:   I/O last 3 completed shifts:  In: 2102.47 [I.V.:806.47; NG/GT:350]  Out: 10  [Urine:10]    3. PHYSICAL EXAMINATION:  General: intubated and sedated, thin  HEENT: Normocephalic, bruising on the right face around right orbit with hematoma and right neck.  Neuro: Intubated and sedated, NAD  Pulm/Resp: breathing with mechanical supports, mechanical breath sounds auscultated, no wheezing or crackles  CV: RRR, warm extremities, capillary refill < 1 second on upper and lower extremities, 2+ pitting edema in hands and feet  Abdomen: Non-distended, PEG in place without erythema or drainage  : Dong catheter in place, urine yellow and clear  Incisions/Skin: Bruising to face and right forearm     4. LABS:   Arterial Blood Gases   Recent Labs   Lab 02/17/24  2120   PH 7.25*   PCO2 78*   PO2 34*   HCO3 34*     Complete Blood Count   Recent Labs   Lab 02/18/24  1418 02/18/24  0842 02/17/24  1830 02/16/24  0703 02/15/24  0620 02/14/24  0807 02/13/24  0700   WBC  --  3.0* 4.2  --   --  4.5 4.5   HGB 7.5* 6.3* 7.8*  --  8.6* 8.2* 9.1*   PLT  --  138* 145* 183  --  175 191     Basic Metabolic Panel  Recent Labs   Lab 02/19/24  0608 02/19/24  0444 02/19/24  0435 02/19/24  0222 02/19/24  0028 02/18/24  2305 02/18/24  2226 02/18/24  1422 02/18/24  1418 02/18/24  0828 02/18/24  0513 02/17/24  1953 02/17/24  1830 02/17/24  1806 02/17/24  1243 02/16/24  1820 02/16/24  0703   NA  --   --   --   --   --   --   --   --   --   --  131*  --  130*  --  130*  --  130*   POTASSIUM  --  4.0  --   --   --   --  3.4  --  4.0  --  3.8   < > 3.6  --  3.9  --  4.0   CHLORIDE  --   --   --   --   --   --   --   --   --   --  95*  --  94*  --  94*  --  93*   CO2  --   --   --   --   --   --   --   --   --   --  30*  --  31*  --  25  --  27   BUN  --   --   --   --   --   --   --   --   --   --  21.8  --  17.4  --  31.7*  --  32.2*   CR  --   --   --   --   --   --   --   --   --   --  2.17*  --  1.81*  --  2.86*  --  3.14*   GLC 93  --  95 113* 126*   < >  --    < >  --    < > 106*   < > 131*   < > 233*   < > 181*    < > =  values in this interval not displayed.     Liver Function Tests  Recent Labs   Lab 24  0513 24  1830 24  1243 24  0703 24  0807 24  1658 24  0700   AST 15 13 15 17   < >  --  31   ALT <5 <5 <5 <5   < >  --  8   ALKPHOS 51 62 79 66   < >  --  56   BILITOTAL 0.2 0.2 0.2 <0.2   < >  --  0.2   ALBUMIN 2.6* 3.1* 3.1* 3.1*   < >  --  3.1*   INR  --  0.90  --   --   --  0.92 0.97    < > = values in this interval not displayed.     Coagulation Profile  Recent Labs   Lab 24  18324  16524  0700   INR 0.90 0.92 0.97       5. RADIOLOGY:   Recent Results (from the past 24 hour(s))   US Upper Extremity Venous Duplex Left    Narrative    EXAMINATION: US UPPER EXTREMITY VENOUS DUPLEX LEFT  2024 10:01 AM       CLINICAL HISTORY: unilateral swelling, rule out DVT    COMPARISON: Ultrasound 2022        PROCEDURE COMMENTS: Ultrasound was performed of the deep venous system  of the left upper extremity using grayscale, color, and spectral  Doppler.    FINDINGS:  The internal jugular, brachiocephalic, subclavian, brachial, basilic  and cephalic veins are visualized and are patent. Venous waveforms are  normal with arterial type waveforms noted in the visualized  hemodialysis fistula. There is normal response to compression. Edema  noted in the superficial subcutaneous soft tissues.      Impression    IMPRESSION:  No deep venous thrombosis in the left upper extremity.    I have personally reviewed the examination and initial interpretation  and I agree with the findings.    ISIDRO NOVAK MD         SYSTEM ID:  [id number]   Echo Complete   Result Value    LVEF  55-60%    Narrative    [id number]  [id number]  [id number]  [id number]^ANUJA^JUAN^MAC     [facility]  Echocardiography Laboratory  [address] [place] [id number]     Name: ALMAS CASIANO  MRN: [id number]  : 1960  Study Date: 2024 09:22 AM  Age: 63  yrs  Gender: Female  Patient Location: [facility]  Reason For Study: CHF  Ordering Physician: JUAN LICEA  Referring Physician: MICHELE SOSA  Performed By: Kateryna Concepcion     BSA: 1.4 m2  Height: 61 in  Weight: 100 lb  BP: 128/75 mmHg  ______________________________________________________________________________  Procedure  Complete Portable Echo Adult.  ______________________________________________________________________________  Interpretation Summary  Global and regional left ventricular function is normal with an EF of 55-60%.  Global right ventricular function is normal. The right ventricle is normal  size.  No significant valvular abnormalities present.  IVC diameter <2.1 cm collapsing >50% with sniff suggests a normal RA pressure  of 3 mmHg.  This study was compared with the study from 02/28/2023. No significant changes  noted.  ______________________________________________________________________________  Left Ventricle  Global and regional left ventricular function is normal with an EF of 55-60%.  Left ventricular size is normal. Left ventricular wall thickness is normal.  Left ventricular diastolic function is indeterminate.     Right Ventricle  Global right ventricular function is normal. The right ventricle is normal  size.     Atria  Severe left atrial enlargement is present. Severe right atrial enlargement is  present.     Mitral Valve  Mild mitral annular calcification is present. Mild mitral insufficiency is  present.     Aortic Valve  The aortic valve is tricuspid. On Doppler interrogation, there is no  significant stenosis or regurgitation.     Tricuspid Valve  The valve leaflets are not well visualized. Trace tricuspid insufficiency is  present. The right ventricular systolic pressure is approximated at 9.1 mmHg  plus the right atrial pressure. The RVSP is probably underestimated.     Pulmonic Valve  The valve leaflets are not well visualized. Trace pulmonic insufficiency  is  present.     Vessels  The aorta root is normal. The thoracic aorta is normal. IVC diameter <2.1 cm  collapsing >50% with sniff suggests a normal RA pressure of 3 mmHg.     Pericardium  No pericardial effusion is present.     Miscellaneous  No significant valvular abnormalities present.     Compared to Previous Study  This study was compared with the study from 2023 . No significant  changes noted.  ______________________________________________________________________________  MMode/2D Measurements & Calculations  IVSd: 0.97 cm  LVIDd: 4.5 cm  LVIDs: 2.6 cm  LVPWd: 0.80 cm  FS: 42.6 %  LV mass(C)d: 130.8 grams  LV mass(C)dI: 92.9 grams/m2  Ao root diam: 3.3 cm  asc Aorta Diam: 3.4 cm  LVOT diam: 2.1 cm  LVOT area: 3.5 cm2  Ao root diam index Ht(cm/m): 2.1  Ao root diam index BSA (cm/m2): 2.3  Asc Ao diam index BSA (cm/m2): 2.4  Asc Ao diam index Ht(cm/m): 2.2  LA Volume (BP): 115.0 ml     LA Volume Index (BP): 81.6 ml/m2  RWT: 0.35  TAPSE: 1.7 cm     Doppler Measurements & Calculations  MV E max julito: 73.5 cm/sec  MV A max julito: 55.7 cm/sec  MV E/A: 1.3  MV dec slope: 240.0 cm/sec2  MV dec time: 0.25 sec  Ao V2 max: 151.0 cm/sec  Ao max P.1 mmHg  Ao V2 mean: 102.6 cm/sec  Ao mean P.5 mmHg  Ao V2 VTI: 31.2 cm  KELLIE(I,D): 2.2 cm2  KELLIE(V,D): 2.1 cm2  LV V1 max PG: 3.4 mmHg  LV V1 max: 91.7 cm/sec  LV V1 VTI: 19.5 cm  SV(LVOT): 67.4 ml  SI(LVOT): 47.9 ml/m2  TR max julito: 151.0 cm/sec  TR max P.1 mmHg     AV Julito Ratio (DI): 0.61  KELLIE Index (cm2/m2): 1.5  E/E' av.2  Lateral E/e': 8.3  Medial E/e': 14.0  RV S Julito: 7.8 cm/sec     ______________________________________________________________________________  Report approved by: Mary Donald 2024 10:59 AM         XR Chest Port 1 View    Narrative    EXAM: XR CHEST PORT 1 VIEW 2024 7:25 PM    DEMOGRAPHICS: 63 years Female    INDICATION: Assess ETT location. History bilateral lung transplant.  Admitted to MICU for hypoxic, hypercarbic  respiratory failure.    COMPARISON: Chest radiograph 2/17/2024. Chest CT 2/17/2024.    TECHNIQUE: Single portable AP view of the chest.    FINDINGS:   Right IJ central venous catheter tip terminates in the right atrium.  Endotracheal tube tip terminates 6 cm above the level of the nabil.    Mediastinal clamshell sternotomy wires are intact. Trachea is midline.  The cardiac silhouette is obscured. Moderate, bilateral, partially  loculated pleural effusions. No pneumothorax. Bilateral perihilar and  bibasilar patchy opacities are unchanged. Aortic knob calcification.  Soft tissues are unremarkable. No acute osseous abnormality.      Impression    IMPRESSION:   1.  Endotracheal tube tip terminates 6 cm above the level of the  nabil.  2.  Unchanged bilateral pleural effusions, and patchy perihilar and  bibasilar opacities.    I have personally reviewed the examination and initial interpretation  and I agree with the findings.    OJHANN MORAES MD         SYSTEM ID:  [id number]

## 2024-02-19 NOTE — PROGRESS NOTES
HEMODIALYSIS TREATMENT NOTE    Date: 2/19/2024  Time: 3:28 PM    Data:  Pre Wt: 47.1 kg   Desired Wt:  44.1 kg   Post Wt: 44.1 kg (calculated)  Weight change: 3 kg  Ultrafiltration - Post Run Net Total Removed (mL): 3000 mL  Vascular Access Status: patent  Dialyzer Rinse: Clear  Total Blood Volume Processed: 0 L Liters UF only  Total Dialysis (Treatment) Time: 2 Hours    Lab:   No    Interventions:  Lidocaine intradermal in AVF arterial and venous site  LAVF cannulated with 15 gauge needles  Needles removed  Pressure dressing applied    Assessment:  Pt ran for 2 hrs UF only on a / DFR SEQ and 3 L pulled with no complications. LAVG positive for T/B. Pt rinsed back and hemostasis achieved in about 10 mins. Pt alert and oriented x4 with no complaints of pain, Nausea, vomiting, or dizziness. Pt tolerated fluid removal well. Report given to PCN     Plan:    Per renal team

## 2024-02-19 NOTE — PROGRESS NOTES
ICU End of Shift Summary. See flowsheets for vital signs and detailed assessment.    Changes this shift: Pt able to follow commands. RASS -2/-3 overnight. Propofol gtt at 25 and fentanyl gtt at 50. Reporting no pain and scoring 0 on CPOT assessments. Pt hypothermic at start of shift. Bear hugger applied with good results. Meeting MAP goal without issue. Sinus juvenal to normal sinus overnight. Remained on CMV settings. Strong cough. 2 BM overnight. Feeds started at trickle. Standard FWF. When TF started, team turned TPN gtt to 30. Remains anuric.     Plan:  Possible HD today. Wean sedation as needed to meet RASS goals.

## 2024-02-19 NOTE — PROGRESS NOTES
"   02/19/24 1235   Appointment Info   Signing Clinician's Name / Credentials (PT) Nandini Lopez, PT, DPT   Living Environment   People in Home grandchild(ray);child(ray), adult   Current Living Arrangements house   Home Accessibility stairs to enter home;stairs within home   Number of Stairs, Main Entrance 1   Stair Railings, Main Entrance none   Number of Stairs, Within Home, Primary other (see comments)  (4 right inside house, then to get to bedroom 4 steps then a flight of stairs. The 2 sets of 4 steps has railings on the left, but flight of stairs on the right.)   Stair Railings, Within Home, Primary railing on left side (ascending);railing on right side (ascending)   Living Environment Comments Living environment info from initial PT eval 2/11   Self-Care   Usual Activity Tolerance moderate   Current Activity Tolerance fair   Equipment Currently Used at Home walker, rolling   Fall history within last six months yes   Number of times patient has fallen within last six months 2  (per initial PT eval 2/11; most recent fall when pt sitting on 4WW trying to work on brake & walker moved out from under her)   Activity/Exercise/Self-Care Comment Pt uses 4WW for mobility at baseline.   General Information   Onset of Illness/Injury or Date of Surgery 02/18/24  (admitted 2/11, transferred to ICU 2/18)   Referring Physician Maribell Cloud MD   Patient/Family Therapy Goals Statement (PT) pt intubated currently; pt nods in agreement to proposed PT plan to continue to work with PT on mobility to work toward mobility needed to discharge home when medically ready   Pertinent History of Current Problem (include personal factors and/or comorbidities that impact the POC) Per chart, \"Sofie Rodriguez is a 63 year old female with PMH notable for bilateral lung transplant in 2022 for COPD, ESRD on HD (T/Th/S), gastroparesis s/p PEG/J, severe malnutrition, recent R femoral fracture s/p ORIF in [place]. Admitted 2/10/24 to [facility] " "for FTT and small bowel dysmotility for port-a-cath placement and TPN initiation. Transferred to ICU evening of 2/17/24 for acute on chronic hypoxic and hypercarbic respiratory failure with severe hypercarbia and AMS requiring intubation, ddx includes pulmonary edema vs infection, possible mixed picture. \" Pt was seen by PT earlier in hospital stay & was mobilizing SBA with 4WW but has not been seen since 2/13 due to cancels for pain/fatigue and medical status. Pt intubated on 2/18 & required pressors initially after intubation. Now seen for PT re-evaluation due to significant change in medical status.   Existing Precautions/Restrictions fall;oxygen therapy device and L/min  (orally intubated, on pressure support with FiO2 30%, PEEP 5, PS 5; likely to extubate soon per RN.)   General Observations pt seated in recliner, agreeable to PT/mobility   Cognition   Cognitive Status Comments pt orally intubated, following commands well and able to communicate in writing   Pain Assessment   Patient Currently in Pain No  (denies pain at rest)   Integumentary/Edema   Integumentary/Edema Comments Some bruising on face noted, from recent fall per chart review   Posture    Posture Forward head position;Protracted shoulders   Range of Motion (ROM)   ROM Comment LE ROM appears WFL with transfers and bed mobility   Strength (Manual Muscle Testing)   Strength Comments Pt demos some functional weakness with transfers and bed mobility   Bed Mobility   Comment, (Bed Mobility) Sit>supine with MaxA of PT for LEs/trunk   Transfers   Comment, (Transfers) Sit>stand from recliner with Emily from PT arm in arm assist   Gait/Stairs (Locomotion)   Comment, (Gait/Stairs) Patient able to take a few steps in place with B UE support on PT/Emily, with limited clearance from floor bilaterally   Balance   Balance Comments Pt able to maintain sitting unsupported at EOB with B UEs on EOB and SBA from PT, in standing, pt benefits from B UE support on PT or " walker   Clinical Impression   Criteria for Skilled Therapeutic Intervention Yes, treatment indicated   PT Diagnosis (PT) impaired functional mobility and independence   Influenced by the following impairments decreased strength, decreased activity tolerance, impaired balance, pain, impaired nutrition/failure to thrive   Functional limitations due to impairments difficulty with bed mobility, transfers, ambulation, stairs   Clinical Presentation (PT Evaluation Complexity) evolving  (transitioned to ICU & intubated since last seen by PT, now nearing readiness for extubation, recent falls and recent R femur ORIF in 12/23)   Clinical Presentation Rationale clinical judgement, pt's current status & recent PMH influencing PT POC   Clinical Decision Making (Complexity) low complexity   Planned Therapy Interventions (PT) bed mobility training;gait training;strengthening;stair training;transfer training   Risk & Benefits of therapy have been explained evaluation/treatment results reviewed;care plan/treatment goals reviewed;patient   Clinical Impression Comments Patient was admitted 2/11 for failure to thrive, impaired nutrition and recent falls & was working with PT during hospitalization & mobilizing with SBA with 4WW; during hospital stay she had respiratory failure & needed intubation and transfer to ICU. Pt currently seen in ICU for PT re-evaluation. Pt presents with impaired functional mobility, needing assist for all mobility currently. She will benefit from continued skilled therapies in hospital to progress functional mobility and independence toward baseline.   PT Total Evaluation Time   PT Nano Preston Minutes ([id number]) 12   Physical Therapy Goals   PT Frequency 5x/week   PT Predicted Duration/Target Date for Goal Attainment 03/04/24   PT Goals Bed Mobility;Transfers;Gait;Stairs   PT: Bed Mobility Independent;Supine to/from sit   PT: Transfers Modified independent;Sit to/from stand;Assistive device   PT: Gait  Modified independent;Assistive device;Greater than 200 feet   PT: Stairs 1 stair;9 stairs;Supervision/stand-by assist  (1 step without rail with Emily or less; 9 steps or more with 1 railing- practice with rail on R & on L)   PT Discharge Planning   PT Discharge Recommendation (DC Rec) Transitional Care Facility;home with assist;home with home care physical therapy   PT Rationale for DC Rec Pt with increased medical status since last seen by PT, now in ICU, intubated at time of PT re-eval 2/19. She demos decreased strength and activity tolerance. Based on mobility today, recommend TCU. Pt was mobilizing fairly well prior to medical setback; pending progress with therapies during hospital stay she may progress in order for discharge home with family assist as needed and home vs OP PT. Recommend TCU at this time. Will continued to follow and update recs as appropriate   PT Brief overview of current status Ax4 bed<>chair transfers with 4WW or with arm in arm assist, OK to trial short ambu in room with Ax1-2 and 4WW after pt is extubated

## 2024-02-19 NOTE — PROGRESS NOTES
Wasted 6 ml (300 mcg) in med room w/ Ananya Barrera RN. Could not waste in pyxis d/t pt coming from [facility] and no new cartridge was pulled from our pyxis. Confirmed w/ 4C pharmacist Irina, who instructed me to put a note in indicating the waste.     Bishnu Escalona RN

## 2024-02-20 ENCOUNTER — APPOINTMENT (OUTPATIENT)
Dept: OCCUPATIONAL THERAPY | Facility: CLINIC | Age: 64
DRG: 640 | End: 2024-02-20
Attending: INTERNAL MEDICINE
Payer: MEDICARE

## 2024-02-20 ENCOUNTER — APPOINTMENT (OUTPATIENT)
Dept: SPEECH THERAPY | Facility: CLINIC | Age: 64
DRG: 640 | End: 2024-02-20
Attending: INTERNAL MEDICINE
Payer: MEDICARE

## 2024-02-20 LAB
1,3 BETA GLUCAN SER-MCNC: 40 PG/ML
ALBUMIN SERPL BCG-MCNC: 3 G/DL (ref 3.5–5.2)
ALP SERPL-CCNC: 60 U/L (ref 40–150)
ALT SERPL W P-5'-P-CCNC: <5 U/L (ref 0–50)
ANION GAP SERPL CALCULATED.3IONS-SCNC: 10 MMOL/L (ref 7–15)
ANION GAP SERPL CALCULATED.3IONS-SCNC: 12 MMOL/L (ref 7–15)
ANION GAP SERPL CALCULATED.3IONS-SCNC: 17 MMOL/L (ref 7–15)
ANION GAP SERPL CALCULATED.3IONS-SCNC: 9 MMOL/L (ref 7–15)
AST SERPL W P-5'-P-CCNC: 17 U/L (ref 0–45)
B-OH-BUTYR SERPL-SCNC: <0.18 MMOL/L
BACTERIA BRONCH: NO GROWTH
BASE EXCESS BLDV CALC-SCNC: -2.9 MMOL/L (ref -3–3)
BASOPHILS # BLD AUTO: ABNORMAL 10*3/UL
BASOPHILS # BLD MANUAL: 0 10E3/UL (ref 0–0.2)
BASOPHILS NFR BLD AUTO: ABNORMAL %
BASOPHILS NFR BLD MANUAL: 1 %
BILIRUB SERPL-MCNC: 0.2 MG/DL
BUN SERPL-MCNC: 11.4 MG/DL (ref 8–23)
BUN SERPL-MCNC: 15.5 MG/DL (ref 8–23)
BUN SERPL-MCNC: 20.2 MG/DL (ref 8–23)
BUN SERPL-MCNC: 40.9 MG/DL (ref 8–23)
CALCIUM SERPL-MCNC: 7.6 MG/DL (ref 8.8–10.2)
CALCIUM SERPL-MCNC: 7.9 MG/DL (ref 8.8–10.2)
CALCIUM SERPL-MCNC: 7.9 MG/DL (ref 8.8–10.2)
CALCIUM SERPL-MCNC: 8.9 MG/DL (ref 8.8–10.2)
CHLORIDE SERPL-SCNC: 93 MMOL/L (ref 98–107)
CHLORIDE SERPL-SCNC: 96 MMOL/L (ref 98–107)
CHLORIDE SERPL-SCNC: 97 MMOL/L (ref 98–107)
CHLORIDE SERPL-SCNC: 98 MMOL/L (ref 98–107)
CREAT SERPL-MCNC: 1.1 MG/DL (ref 0.51–0.95)
CREAT SERPL-MCNC: 1.53 MG/DL (ref 0.51–0.95)
CREAT SERPL-MCNC: 1.87 MG/DL (ref 0.51–0.95)
CREAT SERPL-MCNC: 3.13 MG/DL (ref 0.51–0.95)
CRP SERPL-MCNC: 15.2 MG/L
DEPRECATED HCO3 PLAS-SCNC: 21 MMOL/L (ref 22–29)
DEPRECATED HCO3 PLAS-SCNC: 24 MMOL/L (ref 22–29)
DEPRECATED HCO3 PLAS-SCNC: 27 MMOL/L (ref 22–29)
DEPRECATED HCO3 PLAS-SCNC: 27 MMOL/L (ref 22–29)
EGFRCR SERPLBLD CKD-EPI 2021: 16 ML/MIN/1.73M2
EGFRCR SERPLBLD CKD-EPI 2021: 30 ML/MIN/1.73M2
EGFRCR SERPLBLD CKD-EPI 2021: 38 ML/MIN/1.73M2
EGFRCR SERPLBLD CKD-EPI 2021: 57 ML/MIN/1.73M2
EOSINOPHIL # BLD AUTO: ABNORMAL 10*3/UL
EOSINOPHIL # BLD MANUAL: 0.2 10E3/UL (ref 0–0.7)
EOSINOPHIL NFR BLD AUTO: ABNORMAL %
EOSINOPHIL NFR BLD MANUAL: 4 %
ERYTHROCYTE [DISTWIDTH] IN BLOOD BY AUTOMATED COUNT: 19.9 % (ref 10–15)
GALACTOMANNAN AG SERPL QL IA: NEGATIVE
GALACTOMANNAN AG SPEC IA-ACNC: 0.13
GLUCOSE BLDC GLUCOMTR-MCNC: 116 MG/DL (ref 70–99)
GLUCOSE BLDC GLUCOMTR-MCNC: 121 MG/DL (ref 70–99)
GLUCOSE BLDC GLUCOMTR-MCNC: 131 MG/DL (ref 70–99)
GLUCOSE BLDC GLUCOMTR-MCNC: 177 MG/DL (ref 70–99)
GLUCOSE BLDC GLUCOMTR-MCNC: 178 MG/DL (ref 70–99)
GLUCOSE SERPL-MCNC: 108 MG/DL (ref 70–99)
GLUCOSE SERPL-MCNC: 135 MG/DL (ref 70–99)
GLUCOSE SERPL-MCNC: 136 MG/DL (ref 70–99)
GLUCOSE SERPL-MCNC: 204 MG/DL (ref 70–99)
GRAM STAIN RESULT: NORMAL
GRAM STAIN RESULT: NORMAL
HCO3 BLDV-SCNC: 25 MMOL/L (ref 21–28)
HCT VFR BLD AUTO: 26.3 % (ref 35–47)
HGB BLD-MCNC: 8.2 G/DL (ref 11.7–15.7)
IMM GRANULOCYTES # BLD: ABNORMAL 10*3/UL
IMM GRANULOCYTES NFR BLD: ABNORMAL %
LACTATE SERPL-SCNC: 0.8 MMOL/L (ref 0.7–2)
LYMPHOCYTES # BLD AUTO: ABNORMAL 10*3/UL
LYMPHOCYTES # BLD MANUAL: 1.2 10E3/UL (ref 0.8–5.3)
LYMPHOCYTES NFR BLD AUTO: ABNORMAL %
LYMPHOCYTES NFR BLD MANUAL: 27 %
MAGNESIUM SERPL-MCNC: 1.8 MG/DL (ref 1.7–2.3)
MAGNESIUM SERPL-MCNC: 2.1 MG/DL (ref 1.7–2.3)
MAGNESIUM SERPL-MCNC: 2.3 MG/DL (ref 1.7–2.3)
MCH RBC QN AUTO: 34.5 PG (ref 26.5–33)
MCHC RBC AUTO-ENTMCNC: 31.2 G/DL (ref 31.5–36.5)
MCV RBC AUTO: 111 FL (ref 78–100)
METAMYELOCYTES # BLD MANUAL: 0 10E3/UL
METAMYELOCYTES NFR BLD MANUAL: 1 %
MONOCYTES # BLD AUTO: ABNORMAL 10*3/UL
MONOCYTES # BLD MANUAL: 0.4 10E3/UL (ref 0–1.3)
MONOCYTES NFR BLD AUTO: ABNORMAL %
MONOCYTES NFR BLD MANUAL: 8 %
NEUTROPHILS # BLD AUTO: ABNORMAL 10*3/UL
NEUTROPHILS # BLD MANUAL: 2.6 10E3/UL (ref 1.6–8.3)
NEUTROPHILS NFR BLD AUTO: ABNORMAL %
NEUTROPHILS NFR BLD MANUAL: 59 %
NRBC # BLD AUTO: 0 10E3/UL
NRBC # BLD AUTO: 0 10E3/UL
NRBC BLD AUTO-RTO: 0 /100
NRBC BLD MANUAL-RTO: 1 %
O2/TOTAL GAS SETTING VFR VENT: 21 %
OBSERVATION IMP: NEGATIVE
OXYHGB MFR BLDV: 77 % (ref 70–75)
PCO2 BLDV: 57 MM HG (ref 40–50)
PH BLDV: 7.24 [PH] (ref 7.32–7.43)
PHOSPHATE SERPL-MCNC: 1.5 MG/DL (ref 2.5–4.5)
PHOSPHATE SERPL-MCNC: 2 MG/DL (ref 2.5–4.5)
PHOSPHATE SERPL-MCNC: 2.3 MG/DL (ref 2.5–4.5)
PHOSPHATE SERPL-MCNC: 4.1 MG/DL (ref 2.5–4.5)
PLAT MORPH BLD: ABNORMAL
PLATELET # BLD AUTO: 159 10E3/UL (ref 150–450)
PO2 BLDV: 48 MM HG (ref 25–47)
POTASSIUM SERPL-SCNC: 2.9 MMOL/L (ref 3.4–5.3)
POTASSIUM SERPL-SCNC: 2.9 MMOL/L (ref 3.4–5.3)
POTASSIUM SERPL-SCNC: 3.2 MMOL/L (ref 3.4–5.3)
POTASSIUM SERPL-SCNC: 3.4 MMOL/L (ref 3.4–5.3)
POTASSIUM SERPL-SCNC: 3.4 MMOL/L (ref 3.4–5.3)
POTASSIUM SERPL-SCNC: 3.6 MMOL/L (ref 3.4–5.3)
POTASSIUM SERPL-SCNC: 3.6 MMOL/L (ref 3.4–5.3)
PROT SERPL-MCNC: 5.2 G/DL (ref 6.4–8.3)
RBC # BLD AUTO: 2.38 10E6/UL (ref 3.8–5.2)
RBC MORPH BLD: ABNORMAL
SAO2 % BLDV: 80.3 % (ref 70–75)
SODIUM SERPL-SCNC: 131 MMOL/L (ref 135–145)
SODIUM SERPL-SCNC: 132 MMOL/L (ref 135–145)
SODIUM SERPL-SCNC: 134 MMOL/L (ref 135–145)
SODIUM SERPL-SCNC: 134 MMOL/L (ref 135–145)
TRIGL SERPL-MCNC: 198 MG/DL
WBC # BLD AUTO: 4.4 10E3/UL (ref 4–11)

## 2024-02-20 PROCEDURE — 85007 BL SMEAR W/DIFF WBC COUNT: CPT

## 2024-02-20 PROCEDURE — 84100 ASSAY OF PHOSPHORUS: CPT | Performed by: NURSE PRACTITIONER

## 2024-02-20 PROCEDURE — 80048 BASIC METABOLIC PNL TOTAL CA: CPT

## 2024-02-20 PROCEDURE — 92526 ORAL FUNCTION THERAPY: CPT | Mod: GN

## 2024-02-20 PROCEDURE — 99233 SBSQ HOSP IP/OBS HIGH 50: CPT | Performed by: PHYSICIAN ASSISTANT

## 2024-02-20 PROCEDURE — 250N000013 HC RX MED GY IP 250 OP 250 PS 637: Performed by: INTERNAL MEDICINE

## 2024-02-20 PROCEDURE — 86140 C-REACTIVE PROTEIN: CPT

## 2024-02-20 PROCEDURE — 120N000002 HC R&B MED SURG/OB UMMC

## 2024-02-20 PROCEDURE — 250N000011 HC RX IP 250 OP 636

## 2024-02-20 PROCEDURE — 258N000003 HC RX IP 258 OP 636

## 2024-02-20 PROCEDURE — 92610 EVALUATE SWALLOWING FUNCTION: CPT | Mod: GN

## 2024-02-20 PROCEDURE — 90937 HEMODIALYSIS REPEATED EVAL: CPT

## 2024-02-20 PROCEDURE — 250N000009 HC RX 250: Performed by: INTERNAL MEDICINE

## 2024-02-20 PROCEDURE — 999N000157 HC STATISTIC RCP TIME EA 10 MIN

## 2024-02-20 PROCEDURE — 80053 COMPREHEN METABOLIC PANEL: CPT | Performed by: INTERNAL MEDICINE

## 2024-02-20 PROCEDURE — 84478 ASSAY OF TRIGLYCERIDES: CPT | Performed by: STUDENT IN AN ORGANIZED HEALTH CARE EDUCATION/TRAINING PROGRAM

## 2024-02-20 PROCEDURE — 94660 CPAP INITIATION&MGMT: CPT

## 2024-02-20 PROCEDURE — 634N000001 HC RX 634: Mod: JZ | Performed by: STUDENT IN AN ORGANIZED HEALTH CARE EDUCATION/TRAINING PROGRAM

## 2024-02-20 PROCEDURE — 84100 ASSAY OF PHOSPHORUS: CPT

## 2024-02-20 PROCEDURE — 80048 BASIC METABOLIC PNL TOTAL CA: CPT | Performed by: NURSE PRACTITIONER

## 2024-02-20 PROCEDURE — 250N000011 HC RX IP 250 OP 636: Performed by: INTERNAL MEDICINE

## 2024-02-20 PROCEDURE — 97140 MANUAL THERAPY 1/> REGIONS: CPT | Mod: GO | Performed by: OCCUPATIONAL THERAPIST

## 2024-02-20 PROCEDURE — 82180 ASSAY OF ASCORBIC ACID: CPT

## 2024-02-20 PROCEDURE — 250N000011 HC RX IP 250 OP 636: Mod: JZ

## 2024-02-20 PROCEDURE — 82805 BLOOD GASES W/O2 SATURATION: CPT

## 2024-02-20 PROCEDURE — 250N000012 HC RX MED GY IP 250 OP 636 PS 637: Performed by: INTERNAL MEDICINE

## 2024-02-20 PROCEDURE — 82010 KETONE BODYS QUAN: CPT

## 2024-02-20 PROCEDURE — 85027 COMPLETE CBC AUTOMATED: CPT

## 2024-02-20 PROCEDURE — 99233 SBSQ HOSP IP/OBS HIGH 50: CPT | Mod: 24 | Performed by: INTERNAL MEDICINE

## 2024-02-20 PROCEDURE — 250N000011 HC RX IP 250 OP 636: Performed by: NURSE PRACTITIONER

## 2024-02-20 PROCEDURE — 250N000013 HC RX MED GY IP 250 OP 250 PS 637

## 2024-02-20 PROCEDURE — 258N000003 HC RX IP 258 OP 636: Performed by: STUDENT IN AN ORGANIZED HEALTH CARE EDUCATION/TRAINING PROGRAM

## 2024-02-20 PROCEDURE — 250N000009 HC RX 250: Performed by: STUDENT IN AN ORGANIZED HEALTH CARE EDUCATION/TRAINING PROGRAM

## 2024-02-20 PROCEDURE — 83605 ASSAY OF LACTIC ACID: CPT

## 2024-02-20 PROCEDURE — 83735 ASSAY OF MAGNESIUM: CPT

## 2024-02-20 PROCEDURE — 250N000011 HC RX IP 250 OP 636: Performed by: STUDENT IN AN ORGANIZED HEALTH CARE EDUCATION/TRAINING PROGRAM

## 2024-02-20 PROCEDURE — 99232 SBSQ HOSP IP/OBS MODERATE 35: CPT | Mod: GC | Performed by: STUDENT IN AN ORGANIZED HEALTH CARE EDUCATION/TRAINING PROGRAM

## 2024-02-20 PROCEDURE — 99232 SBSQ HOSP IP/OBS MODERATE 35: CPT | Mod: 24 | Performed by: INTERNAL MEDICINE

## 2024-02-20 PROCEDURE — 83735 ASSAY OF MAGNESIUM: CPT | Performed by: NURSE PRACTITIONER

## 2024-02-20 RX ORDER — POTASSIUM CHLORIDE 20MEQ/15ML
40 LIQUID (ML) ORAL ONCE
Status: DISCONTINUED | OUTPATIENT
Start: 2024-02-20 | End: 2024-02-20

## 2024-02-20 RX ORDER — ALBUTEROL SULFATE 0.83 MG/ML
2.5 SOLUTION RESPIRATORY (INHALATION)
Status: DISCONTINUED | OUTPATIENT
Start: 2024-02-20 | End: 2024-04-04

## 2024-02-20 RX ORDER — SODIUM CHLORIDE FOR INHALATION 3 %
3 VIAL, NEBULIZER (ML) INHALATION
Status: DISCONTINUED | OUTPATIENT
Start: 2024-02-20 | End: 2024-04-09

## 2024-02-20 RX ORDER — POTASSIUM CHLORIDE 20MEQ/15ML
20 LIQUID (ML) ORAL ONCE
Status: COMPLETED | OUTPATIENT
Start: 2024-02-20 | End: 2024-02-20

## 2024-02-20 RX ORDER — LIDOCAINE 4 G/G
1 PATCH TOPICAL
Status: DISCONTINUED | OUTPATIENT
Start: 2024-02-20 | End: 2024-04-15 | Stop reason: HOSPADM

## 2024-02-20 RX ORDER — ACETAMINOPHEN 325 MG/1
650 TABLET ORAL EVERY 6 HOURS
Status: DISCONTINUED | OUTPATIENT
Start: 2024-02-20 | End: 2024-02-23

## 2024-02-20 RX ORDER — HEPARIN SODIUM 1000 [USP'U]/ML
500 INJECTION, SOLUTION INTRAVENOUS; SUBCUTANEOUS CONTINUOUS
Status: DISCONTINUED | OUTPATIENT
Start: 2024-02-20 | End: 2024-03-09

## 2024-02-20 RX ORDER — ACETAMINOPHEN 325 MG/10.15ML
750 LIQUID ORAL EVERY 6 HOURS
Status: DISCONTINUED | OUTPATIENT
Start: 2024-02-20 | End: 2024-02-20

## 2024-02-20 RX ADMIN — LIDOCAINE: 40 CREAM TOPICAL at 10:34

## 2024-02-20 RX ADMIN — CYANOCOBALAMIN TAB 500 MCG 500 MCG: 500 TAB at 07:41

## 2024-02-20 RX ADMIN — PREDNISONE 5 MG: 5 TABLET ORAL at 07:40

## 2024-02-20 RX ADMIN — SODIUM CHLORIDE 250 ML: 9 INJECTION, SOLUTION INTRAVENOUS at 11:15

## 2024-02-20 RX ADMIN — Medication 40 MG: at 20:56

## 2024-02-20 RX ADMIN — LIDOCAINE 4% 1 PATCH: 40 PATCH TOPICAL at 20:55

## 2024-02-20 RX ADMIN — EPOETIN ALFA-EPBX 8000 UNITS: 10000 INJECTION, SOLUTION INTRAVENOUS; SUBCUTANEOUS at 11:16

## 2024-02-20 RX ADMIN — ACETAMINOPHEN 750 MG: 325 SOLUTION ORAL at 04:22

## 2024-02-20 RX ADMIN — MICAFUNGIN SODIUM 100 MG: 50 INJECTION, POWDER, LYOPHILIZED, FOR SOLUTION INTRAVENOUS at 09:05

## 2024-02-20 RX ADMIN — PREDNISONE 2.5 MG: 2.5 TABLET ORAL at 20:55

## 2024-02-20 RX ADMIN — ONDANSETRON 4 MG: 2 INJECTION INTRAMUSCULAR; INTRAVENOUS at 01:30

## 2024-02-20 RX ADMIN — INSULIN ASPART 1 UNITS: 100 INJECTION, SOLUTION INTRAVENOUS; SUBCUTANEOUS at 11:37

## 2024-02-20 RX ADMIN — ACETAMINOPHEN 650 MG: 325 TABLET, FILM COATED ORAL at 10:53

## 2024-02-20 RX ADMIN — TACROLIMUS 4.5 MG: 5 CAPSULE ORAL at 17:41

## 2024-02-20 RX ADMIN — CALCIUM CARBONATE 600 MG (1,500 MG)-VITAMIN D3 400 UNIT TABLET 1 TABLET: at 11:36

## 2024-02-20 RX ADMIN — PIPERACILLIN AND TAZOBACTAM 2.25 G: 2; .25 INJECTION, POWDER, FOR SOLUTION INTRAVENOUS at 15:20

## 2024-02-20 RX ADMIN — INSULIN ASPART 1 UNITS: 100 INJECTION, SOLUTION INTRAVENOUS; SUBCUTANEOUS at 15:26

## 2024-02-20 RX ADMIN — SODIUM CHLORIDE 300 ML: 9 INJECTION, SOLUTION INTRAVENOUS at 11:15

## 2024-02-20 RX ADMIN — PIPERACILLIN AND TAZOBACTAM 2.25 G: 2; .25 INJECTION, POWDER, FOR SOLUTION INTRAVENOUS at 10:34

## 2024-02-20 RX ADMIN — POTASSIUM & SODIUM PHOSPHATES POWDER PACK 280-160-250 MG 1 PACKET: 280-160-250 PACK at 20:56

## 2024-02-20 RX ADMIN — CALCIUM CARBONATE 600 MG (1,500 MG)-VITAMIN D3 400 UNIT TABLET 1 TABLET: at 07:40

## 2024-02-20 RX ADMIN — HEPARIN SODIUM 5000 UNITS: 5000 INJECTION, SOLUTION INTRAVENOUS; SUBCUTANEOUS at 11:36

## 2024-02-20 RX ADMIN — IRON SUCROSE 50 MG: 20 INJECTION, SOLUTION INTRAVENOUS at 11:17

## 2024-02-20 RX ADMIN — POTASSIUM CHLORIDE 20 MEQ: 40 SOLUTION ORAL at 18:55

## 2024-02-20 RX ADMIN — CALCIUM CARBONATE 600 MG (1,500 MG)-VITAMIN D3 400 UNIT TABLET 1 TABLET: at 17:46

## 2024-02-20 RX ADMIN — PIPERACILLIN AND TAZOBACTAM 2.25 G: 2; .25 INJECTION, POWDER, FOR SOLUTION INTRAVENOUS at 04:27

## 2024-02-20 RX ADMIN — Medication 40 MG: at 07:40

## 2024-02-20 RX ADMIN — MAGNESIUM SULFATE HEPTAHYDRATE: 500 INJECTION, SOLUTION INTRAMUSCULAR; INTRAVENOUS at 20:55

## 2024-02-20 RX ADMIN — PIPERACILLIN AND TAZOBACTAM 2.25 G: 2; .25 INJECTION, POWDER, FOR SOLUTION INTRAVENOUS at 22:00

## 2024-02-20 RX ADMIN — ACETAMINOPHEN 650 MG: 325 TABLET, FILM COATED ORAL at 17:45

## 2024-02-20 RX ADMIN — TACROLIMUS 4 MG: 5 CAPSULE ORAL at 07:40

## 2024-02-20 ASSESSMENT — ACTIVITIES OF DAILY LIVING (ADL)
ADLS_ACUITY_SCORE: 33
ADLS_ACUITY_SCORE: 35
ADLS_ACUITY_SCORE: 33
ADLS_ACUITY_SCORE: 35
ADLS_ACUITY_SCORE: 35
ADLS_ACUITY_SCORE: 33
ADLS_ACUITY_SCORE: 35
ADLS_ACUITY_SCORE: 35

## 2024-02-20 NOTE — PLAN OF CARE
Major Shift Events: Patient refused BiPAP use. Patient was able to wear mask for 1 hr fro 04-05, and 30 min from 0530 to 0600. Stated they will wear the mask if they can have food. Tylenol dose changed to QID from TID.    Neuro: Intact, pain in right side of body, tylenol and heat packs used with good effect.  CV: NSR-ST. . Afebrile, hypertensive, but less than 170 systolic.  Resp: RA, LS clear. Unable to tolerate BiPAP.  GI: NPO. PEG/J TF at 10ml/hr. Standard FWF. TPN and lipids. Loose stools x3, 1 incontinent.  : Anuric, some urine with BMs.   Skin: Skin tear on left and right forearm. Petechia on shins. Bruising on right face, neck, and hip.  IV: Right single lumen tunneled subclavian CVC. Right forearm PIV. Left AV fistula.  Drips: TPN, Lipids, TKO    Plan: Speech consult, Dialysis, Possible floor orders.    For complete assessment and vital data see flowsheets.

## 2024-02-20 NOTE — PROGRESS NOTES
Lung Transplant Consult Follow Up Note   February 20, 2024            Assessment and Plan:   Sofie Rodriguez is a 63 year old female with h/o bilateral lung transplant for COPD on 6/28/22 with course complicated by post-operative hemidiaphragm palsy, recurrent PNAs, positive DSA, EBV viremia, hypogammaglobulinemia, severe gastroparesis s/p G/J tube placement 7/27/22 with pyloric botox 1/25/23, GI bleed 2/2 pyloric ulcer, hemobilia s/p ERCP and MRCP, chronic diarrhea, recurrent C diff colitis, and ESRD on HD.     Admitted May 2023 for FTT, supposed to be readmitted in July for FTT, but refused. Admitted to OSH 12/4-12/31 in December for right hip fracture s/p ORIF, now with significant deconditioning and ongoing severe malnutrition with gastroparesis, small bowel hypermotility and failure to tolerate any tube feeds.     After extensive discussion with the dietician, GI and ultimately convincing the patient, the patient was admitted on 2/10 for initiation of TPN/lipids. She is s/p port placement with persistent pain at port site. GI recommends trickle feeds for maintaining bowel and CT enterography to look for structural abnormalities (unable due to large bolus enteral contrast required for the study). She was transferred to the ICU on 2/17 with worsening mental status, worsening respiratory acidosis despite Bpap use, ultimately requiring intubation and mechanical ventilation. CT chest concerning for new infection vs volume overload and started on empiric antimicrobial therapy (micafungin, zosyn, vanco) however extubated 2/20 and infectious work up is negative     Recommendations:   - Ok to discontinue vancomycin, recommend 7 day course of zosyn, please continue micafungin at this time to give blood cultures another day of growth  - please consider transplant ID consult given unclear infectious source  -electrolyte replacement as per primary team to manage patients probable refeeding syndrome  -please re-involve GI in  her care  - EBV ordered - pending  - Strict I/Os and daily weights, appreciate nephrology assistance with volume management  - O2 to keep SaO2 > 92%  - Check Prospera (ordered 2/18 -pending)  - Tacro level therapeutic 2/19, continue current regimen with repeat level 2/21     S/p bilateral lung transplant:   Right hemidiaphragm palsy:   Suspected CARLEE:  New consolidated nodular opacities and GGO concerning for infection:  seen in clinic last week, severely deconditioned. CMV 2/7 negative. ImmuKnow 108 and cfDNA 0.12 on 1/10. CT 2/7 with decreased MARLON opacities, new tree in bud RLL opacities without new symptoms. PFTs unchanged in clinic (but ATS not met), remain significantly below her baseline (1.4-1.5L). Noted increased dyspnea since the Port-A-Cath placement.  Review of notes indicate patient should be on 2 L o2 at night from prior overnight O2 study in Jan 2023. Also reported a slight increase in cough. She was requiring O2 for comfort only but decompensated on 2/17 with worsening encephalopathy, respiratory acidosis (pCO2 up to 106). CT chest 2/17 showed very patchy and new consolidative, nodular opacities, GGO primarily in the bases and intralobular septal thickening concerning for pulmonary edema and volume overload along with new, possibly fungal vs. Atypical infection vs. PTLD (less likely but in the differential) vs. Septic emboli.  Based on negative infectious work up at this time, would favor volume overload in the setting of initiation of TPN  - bronch with lavage of RML 2/18 showed very friable tissue; no significant lab results  - DSA 2/7 with persistent DQ B2, MFI increased to 6966, see below  - Check Prospera to evaluate significance of positive DSA (ordered)  - when appropriate - repeat overnight oximetry study   - schedule follow up with sleep to discuss possible CPAP given recommendations from August sleep study  - appreciate strict I&O and volume management as per primary and nephrology      Immunosuppression:  - Tacrolimus 4 mg qAM and tacrolimus 4.5 mg qPM. Goal level 7-9. Continue current dosing, repeat level 2/21 (ordered).   - Await Prospera for consideration of IS regimen alterations  - Continue Prednisone 5 mg/2.5 mg     Prophylaxis:   - Dapsone 50 mg q MWf for PJP ppx     Positive DSA: no prior treatment for AMR, has been watched for quite some time given no pulmonary symptoms, prior PFTs stability and significant and ongoing failure to thrive. Last DSA improved to 5723, however will ongoing lower PFTs, may need to consider AMR treatment, however, unclear how she would tolerate.   -  DSA 2/7 with persistent DQ B2, MFI increased to 6966, see below  - Check Prospera to evaluate significance of positive DSA (ordered 2/18)     ESRD: Dialysis dependent.  Suspect her respiratory symptoms were volume overload secondary to initiation of TPN.  CT chest suggesting volume overload. Has undergone multiple days in a row of dialysis to try to pull fluid  -BNP >70,000.  -Dialysis as per nephrology.  -May require daily dialysis until euvolemic again  -Monitor strict ins and outs      EBV viremia: last level of 96K (2/7), CT c/a/p (2/7) without adenopathy.  - EBV ordered for 2/18 - 66031.    - CMV ordered 2/19 and pending     Acute metabolic encephalopathy - resolved    Severe protein calorie malnutrition:  FTT:   Gastroparesis s/p PEG/J s/p botox and G-POEM:   SB Hypomotility  Pyloric ulcer:  Chronic nausea and osmotic diarrhea:  SIBO s/p rifaximin:   Recurrent C diff colitis: chronic diarrhea since transplant with recurrent episodes of C diff. GI previously consulted, felt stools consistent with osmotic diarrhea. Over the last year has lost 40 lbs, unable to tolerate any combination of TF, most recently elemental formula. Normal fecal elastase last May. Following with Dr. Navarro who feels her main two issues are vagal injury induced gastroparesis and probably small bowel hypomotility. Her intolerance to  J-tube feeds suggests the latter to be a significant issue (notes in a message that there are no motility experts at the  and he is limited in what can be offered). Now s/p port placement with TPN and lipids. GI recommending trickle feeds and CT to assess for structural issues.   -close monitoring as patient is high risk for refeeding syndrome  -Recs per primary and GI  -Concern that patient will not tolerate CT enterography according to chart notes as she will require 1500 mL enteral contrast bolus which she is unlikely to tolerate given her gastroparesis and reduced bowel function.     We appreciate the excellent care provided by the ICU team.    Will continue to follow along closely, please do not hesitate to call with any questions or concerns. Patient seen and discussed with staff pulmonologist Dr. Reggie Barnhart MD  PAC[facility] Fellow           Interval History:   Extubated yesterday. She is feeling well today, no complaints. Breathing feels good, no abdominal pain, chest pain, cough, fever, chills, headache, chills. B/l LE pain          Medications:      acetaminophen  650 mg Oral or Feeding Tube Q6H    calcium carbonate-vitamin D  1 tablet Per J Tube TID w/meals    cyanocobalamin  500 mcg Per Feeding Tube Daily    dapsone  50 mg Per J Tube Q Mon Wed Fri AM    heparin ANTICOAGULANT  5,000 Units Subcutaneous Q12H    insulin aspart  1-4 Units Subcutaneous Q4H    lidocaine  1 patch Transdermal Q24h    lipids plant base  250 mL Intravenous Once per day on Monday Wednesday Friday    [Held by provider] metoprolol  25 mg Per J Tube BID    micafungin  100 mg Intravenous Q24H    pantoprazole  40 mg Per J Tube BID    piperacillin-tazobactam  2.25 g Intravenous Q6H    predniSONE  5 mg Per J Tube QAM    And    predniSONE  2.5 mg Per J Tube QPM    [Held by provider] sevelamer carbonate (RENVELA)  0.8 g Oral BID    tacrolimus  4 mg Per J Tube QAM    And    tacrolimus  4.5 mg Per J Tube QPM     albumin human, albuterol,  calcium carbonate, dextrose, dextrose, glucose **OR** dextrose **OR** glucagon, lidocaine (buffered or not buffered), lidocaine (buffered or not buffered), lidocaine-prilocaine, loperamide, naloxone **OR** naloxone **OR** naloxone **OR** naloxone, ondansetron **OR** ondansetron, senna-docusate **OR** senna-docusate, sodium chloride, sodium chloride (PF), sodium chloride 0.9%, sodium chloride 0.9%, - MEDICATION INSTRUCTIONS -         Physical Exam:   Temp:  [97.7  F (36.5  C)-99.4  F (37.4  C)] 98.8  F (37.1  C)  Pulse:  [] 103  Resp:  [13-27] 27  BP: (125-161)/(68-92) 129/76  SpO2:  [91 %-100 %] 95 %      Intake/Output Summary (Last 24 hours) at 2/20/2024 1700  Last data filed at 2/20/2024 1600  Gross per 24 hour   Intake 2823.15 ml   Output 6350 ml   Net -3526.85 ml          Gen: awake, NAD, nontoxic, thin and cachectic appearing, frail,   HEENT: scattered R sided ecchymosis and swelling, EOM grossly intact  Pulm: Poor air movement bilaterally, no wheezing, no increased WOB  Cardio: RRR, no edema  Abdo: soft, hypoactive BS, nondistended  MSK: thin, no bony malformations  Skin: diffuse bruising, skin tears, no rashes  Neuro: alert and oriented, moving all extremities  Psych: does not appear anxious, denies anxiety           Data:   All laboratory and imaging data reviewed.    Results for orders placed or performed during the hospital encounter of 02/10/24 (from the past 24 hour(s))   Glucose by meter   Result Value Ref Range    GLUCOSE BY METER POCT 123 (H) 70 - 99 mg/dL   Magnesium   Result Value Ref Range    Magnesium 2.3 1.7 - 2.3 mg/dL   Potassium   Result Value Ref Range    Potassium 3.7 3.4 - 5.3 mmol/L   Phosphorus   Result Value Ref Range    Phosphorus 3.9 2.5 - 4.5 mg/dL   Blood gas venous   Result Value Ref Range    pH Venous 7.27 (L) 7.32 - 7.43    pCO2 Venous 56 (H) 40 - 50 mm Hg    pO2 Venous 44 25 - 47 mm Hg    Bicarbonate Venous 26 21 - 28 mmol/L    Base Excess/Deficit Venous -1.7 -3.0 - 3.0  mmol/L    FIO2 21     Oxyhemoglobin Venous 75 70 - 75 %    O2 Sat, Venous 77.1 (H) 70.0 - 75.0 %    Narrative    In healthy individuals, oxyhemoglobin (O2Hb) and oxygen saturation (SO2) are approximately equal. In the presence of dyshemoglobins, oxyhemoglobin can be considerably lower than oxygen saturation.   Glucose by meter   Result Value Ref Range    GLUCOSE BY METER POCT 117 (H) 70 - 99 mg/dL   CBC with Platelets & Differential    Narrative    The following orders were created for panel order CBC with Platelets & Differential.  Procedure                               Abnormality         Status                     ---------                               -----------         ------                     CBC with platelets and d...[[id number]]  Abnormal            Final result               Manual Differential[[id number]]          Abnormal            Final result                 Please view results for these tests on the individual orders.   CRP inflammation   Result Value Ref Range    CRP Inflammation 15.20 (H) <5.00 mg/L   Triglycerides   Result Value Ref Range    Triglycerides 198 (H) <150 mg/dL   Comprehensive metabolic panel   Result Value Ref Range    Sodium 131 (L) 135 - 145 mmol/L    Potassium 3.6 3.4 - 5.3 mmol/L    Carbon Dioxide (CO2) 21 (L) 22 - 29 mmol/L    Anion Gap 17 (H) 7 - 15 mmol/L    Urea Nitrogen 40.9 (H) 8.0 - 23.0 mg/dL    Creatinine 3.13 (H) 0.51 - 0.95 mg/dL    GFR Estimate 16 (L) >60 mL/min/1.73m2    Calcium 8.9 8.8 - 10.2 mg/dL    Chloride 93 (L) 98 - 107 mmol/L    Glucose 136 (H) 70 - 99 mg/dL    Alkaline Phosphatase 60 40 - 150 U/L    AST 17 0 - 45 U/L    ALT <5 0 - 50 U/L    Protein Total 5.2 (L) 6.4 - 8.3 g/dL    Albumin 3.0 (L) 3.5 - 5.2 g/dL    Bilirubin Total 0.2 <=1.2 mg/dL   Magnesium   Result Value Ref Range    Magnesium 2.3 1.7 - 2.3 mg/dL   Potassium   Result Value Ref Range    Potassium 3.6 3.4 - 5.3 mmol/L   Phosphorus   Result Value Ref Range    Phosphorus 4.1 2.5 - 4.5 mg/dL    Blood gas venous   Result Value Ref Range    pH Venous 7.24 (L) 7.32 - 7.43    pCO2 Venous 57 (H) 40 - 50 mm Hg    pO2 Venous 48 (H) 25 - 47 mm Hg    Bicarbonate Venous 25 21 - 28 mmol/L    Base Excess/Deficit Venous -2.9 -3.0 - 3.0 mmol/L    FIO2 21     Oxyhemoglobin Venous 77 (H) 70 - 75 %    O2 Sat, Venous 80.3 (H) 70.0 - 75.0 %    Narrative    In healthy individuals, oxyhemoglobin (O2Hb) and oxygen saturation (SO2) are approximately equal. In the presence of dyshemoglobins, oxyhemoglobin can be considerably lower than oxygen saturation.   CBC with platelets and differential   Result Value Ref Range    WBC Count 4.4 4.0 - 11.0 10e3/uL    RBC Count 2.38 (L) 3.80 - 5.20 10e6/uL    Hemoglobin 8.2 (L) 11.7 - 15.7 g/dL    Hematocrit 26.3 (L) 35.0 - 47.0 %     (H) 78 - 100 fL    MCH 34.5 (H) 26.5 - 33.0 pg    MCHC 31.2 (L) 31.5 - 36.5 g/dL    RDW 19.9 (H) 10.0 - 15.0 %    Platelet Count 159 150 - 450 10e3/uL    % Neutrophils      % Lymphocytes      % Monocytes      % Eosinophils      % Basophils      % Immature Granulocytes      NRBCs per 100 WBC 0 <1 /100    Absolute Neutrophils      Absolute Lymphocytes      Absolute Monocytes      Absolute Eosinophils      Absolute Basophils      Absolute Immature Granulocytes      Absolute NRBCs 0.0 10e3/uL   Glucose by meter   Result Value Ref Range    GLUCOSE BY METER POCT 131 (H) 70 - 99 mg/dL   Manual Differential   Result Value Ref Range    % Neutrophils 59 %    % Lymphocytes 27 %    % Monocytes 8 %    % Eosinophils 4 %    % Basophils 1 %    % Metamyelocytes 1 %    NRBCs per 100 WBC 1 (H) <=0 %    Absolute Neutrophils 2.6 1.6 - 8.3 10e3/uL    Absolute Lymphocytes 1.2 0.8 - 5.3 10e3/uL    Absolute Monocytes 0.4 0.0 - 1.3 10e3/uL    Absolute Eosinophils 0.2 0.0 - 0.7 10e3/uL    Absolute Basophils 0.0 0.0 - 0.2 10e3/uL    Absolute Metamyelocytes 0.0 <=0.0 10e3/uL    Absolute NRBCs 0.0 <=0.0 10e3/uL    RBC Morphology Confirmed RBC Indices     Platelet Assessment   Automated Count Confirmed. Platelet morphology is normal.     Automated Count Confirmed. Platelet morphology is normal.   Ketone Beta-Hydroxybutyrate Quantitative   Result Value Ref Range    Ketone (Beta-Hydroxybutyrate) Quantitative <0.18 <=0.30 mmol/L   Glucose by meter   Result Value Ref Range    GLUCOSE BY METER POCT 121 (H) 70 - 99 mg/dL   Lactic acid whole blood   Result Value Ref Range    Lactic Acid 0.8 0.7 - 2.0 mmol/L   Glucose by meter   Result Value Ref Range    GLUCOSE BY METER POCT 177 (H) 70 - 99 mg/dL   Potassium   Result Value Ref Range    Potassium 2.9 (L) 3.4 - 5.3 mmol/L   Basic metabolic panel   Result Value Ref Range    Sodium 132 (L) 135 - 145 mmol/L    Potassium 2.9 (L) 3.4 - 5.3 mmol/L    Chloride 96 (L) 98 - 107 mmol/L    Carbon Dioxide (CO2) 24 22 - 29 mmol/L    Anion Gap 12 7 - 15 mmol/L    Urea Nitrogen 11.4 8.0 - 23.0 mg/dL    Creatinine 1.10 (H) 0.51 - 0.95 mg/dL    GFR Estimate 57 (L) >60 mL/min/1.73m2    Calcium 7.9 (L) 8.8 - 10.2 mg/dL    Glucose 204 (H) 70 - 99 mg/dL   Magnesium   Result Value Ref Range    Magnesium 2.1 1.7 - 2.3 mg/dL   Phosphorus   Result Value Ref Range    Phosphorus 1.5 (L) 2.5 - 4.5 mg/dL   Glucose by meter   Result Value Ref Range    GLUCOSE BY METER POCT 178 (H) 70 - 99 mg/dL     *Note: Due to a large number of results and/or encounters for the requested time period, some results have not been displayed. A complete set of results can be found in Results Review.     CT chest 2/17/24  IMPRESSION:   1.  Patchy multifocal consolidative opacities with adjacent  groundglass attenuation throughout the lungs, new/increased since  2/7/2024 CT, concerning for acute infection/inflammatory process with  likely superimposed pulmonary edema.   2.  Small bilateral pleural effusions.  3.  Small volume of frothy debris within the distal trachea and right  mainstem bronchus.  4.  Stable cardiomegaly.

## 2024-02-20 NOTE — PROVIDER NOTIFICATION
Pt placed on BIPAP at around midnight but pt came off V60 within an hour complaining of nausea and not tolerating mask.    02/20/24 0000   CPAP/BiPAP/Settings   $CPAP/BiPAP Initial completed   BIPAP/CPAP On Standby On   IPAP/EPAP (cmH2O) 12/5   Rate (breaths/min) 14   Oxygen (%) 21   Timed Inspiration (sec) 1

## 2024-02-20 NOTE — PROGRESS NOTES
Care Management Follow Up    Length of Stay (days): 9    Expected Discharge Date: no expected discharge date      Concerns to be Addressed: discharge planning, supportive counseling     Patient plan of care discussed at interdisciplinary rounds: Yes    Anticipated Discharge Disposition: TCU vs Home with assist     Anticipated Discharge Services: TBD    Anticipated Discharge DME: TBD    Patient/family educated on Medicare website which has current facility and service quality ratings:  No    Education Provided on the Discharge Plan: Plan TBD    Patient/Family in Agreement with the Plan: Plan TBD    Referrals Placed by CM/SW: None at this time     Private pay costs discussed: Not applicable    Additional Information:  Sw met with Sofie at the bedside. Sofie was eating lunch upon my arrival and was open to a check in. Sofie reports she is doing much better since her initial admission. She was extubated yesterday afternoon. Sofie reports prior to her admission things seemed to be going okay at home, however, she was losing a lot of weight and had a recent fall. She reports she is working on gaining some of this weight back while she is in the hospital. Sofie has been meeting with PT/OT- current recs fluctuating between TCU vs home with assist. Sofie and I dicussed possibility of TCU pending her work with therapies for the remainder of this admission. She reports she has never been to a TCU. I let Sofie know SW would continue to follow re: her plan of care. I inquired if Sofie would like me to check in with her daughter(s) and she declined stating she has been keeping them posted. No additional SW needs at this time.     MACHO Ramirez, [facility]  SOT/BMT/CF Float      Primary Transplant SW:   Mana Valdivia, [facility]  Lung Transplant   Phone: [phone] Pager: [phone]

## 2024-02-20 NOTE — PLAN OF CARE
ICU End of Shift Summary. See flowsheets for vital signs and detailed assessment.    Changes this shift: Pt A/O x4, VSS on RA. Multiple loose stools, incontinent. SLP consult, renal diet w/ thin liquids ordered. Tolerating diet, good appetite. TF remain at trickle rate. 1448 BMP drawn during dialysis, leading to questionable results. Redraw sent, phos and K remain low, replacements ordered. HD today, pulled 3L. Vaseline gauze dressings applied to skin tears. M/S no tele orders placed, transferred to [place] [id number] service. Up in chair A1 for ~2 hrs. Lymph wraps placed.     Plan: transfer to floor, update team w/ changes    Goal Outcome Evaluation:      Plan of Care Reviewed With: patient    Overall Patient Progress: improvingOverall Patient Progress: improving    Outcome Evaluation: Floor orders, tolerating renal diet

## 2024-02-20 NOTE — PROGRESS NOTES
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 Now extubated and stable on room air, eating normal diet.     CHANGES and MAJOR THINGS TODAY:   - Extubated  - PRN hypertonic saline inhaler with albuterol nebs  - Consider Zofran if n/v  - Calorie counts   - BID refeeding labs  - follow-up on ketone and lactate for evaluation of new anion gap  - lidocaine patch PRN for neck pain    PLAN:     Neuro:  # Pain and sedation  Patient was previously sedated for mechanical ventilation, not currently sedated. Reports soreness in her neck from lying in bed.  -Lidocaine patch prn     # Encephalopathy secondary to hypercarbia - resolved  Patient was progressively more lethargic on 2/17 during admission in [facility]. Etiology likely secondary to hypercarbia in context of respiratory failure as patient was noted to have high CO2s concomitant with lethargy. Though receiving oxycodone and fentanyl, lethargy was only partially alleviated by narcan. LFTs and ammonia wnl with low suspicion of hepatic encephalopathy. BUN wnl with low suspicion for uremic encephalopathy. Head CT was negative with low suspicion of acute intracranial pathology. Patient is now awake and alert, following commands as of 2/20.      Pulmonary:  # S/P Lung transplant 2022  # Recurrent pneumonia   # Acute hypoxic and hypercarbic respiratory failure  # Respiratory acidosis  Patient  received a bilateral lung transplant 6/28/22 for COPD. Was admitted 2/10 to address malnutrition needs (see below) however hospital course was complicated by worsening oxygen requirements. Imaging with increased bilateral opacities on CXR 2/17. VBG with PCO2 at 106, increased from baseline pCO2s of ~70-80. Eventually progressed to respiratory failure requiring mechanical ventilatory support. Likely pulmonary edema given hx of ESRD requiring HD vs infection given hx of lung transplant, immunosuppressed status, and recurrent pneumonias. Patient was extubated on 2/19, did not tolerate BiPAP overnight, more acidemic into the morning but stable, likely acute on chronic from diaphragm paralysis and deconditioning.   - Initial decompensation likely from opioids > important to limit medications that would depress respiration  - PTA immunosuppressive agent  > Prednisone 5 mg every morning, 2.5 mg every afternoon; tacrolimus 4 mg every morning, 4 mg every afternoon  > Monitor tacro levels, goal 7-9, pulm transplant following  - PTA dapsone MWF for PJP prophylaxis  - Follow-up on BAL and diagnostics 2/18, 2/19 > all negative   > Bcx: NGTD   > Ucx: in progress   > RVP: neg   > Res cx and GS (sputum and BAL): neg   > KOH: neg   > Crypto: neg   > MRSA Nares: neg  - Continue zosyn for empiric treatment of HAP (2/17 - 2/23 tentatively) (see ID section)  - Hypertonic saline nebs PRN  - HD as indicated (see below)  - Transplant pulmonology following, appreciate recs    Cardiovascular:  # HTN  # A-fib, resolved  Noted atrial fibrillation on arrival with HR elevated at 150, not sustained for > 10 minutes and otherwise hemodynamically stable. Rates stable in the 70's.  - PTA metoprolol 25mg BID held given recent soft BPs, can likely unhold in upcoming days  - Continuous telemetry     # Possible volume overload vs HF, resolved  Patient is on T/Th/Sat HD for ESRD. Had an elevated BNP during admission. Given decompensation in respiratory  status, possibly had volume overload vs HF leading to pulmonary edema.   - Repeat echo 2/18: globally preserved function, EF 55-60%, ICV diameter <2.1 and collapsing >50% with sniff, normal RA pressure, no significant valvular abnormalities   - HD as indicated (see below)     GI/Nutrition:  # Gastroparesis, small bowel dysmotility  # S/P PEG/J with intolerance of enteral nutrition   # Severe malnutrition  # Hypoalbuminemia   Patient with gastroparesis (presumed due to vagal injury) and small bowel dysmotility complicated by unintentional 40lb weight loss over the past year and now severe malnutrition. Reportedly intolerant to oral food intake due to nausea. Reportedly had LE edema due to hypoalbuminemia. Was initially admitted for portacath and TPN initiation since 2/13. Requested to eat after extubation but team did not feel it to be safe given risk of aspiration in the context of being recently intubated and being unable to tolerate food by mouth within the past year.  - Continue TPN, trickle feeds (nutrition consulted)   - Nephrology: limit fluid < 1.5 L per day for TPN  - Monitor refeeding labs BID, may not need to replete aggressively as she is a dialysis patient  - Renal diet, cleared by SLP     # GERD  - PTA PPI     Renal/Fluids/Electrolytes:  # ESRD on HD T/Th/Sat  # Hypervolemic hyponatremia  History of ESRD, on HD. Concern for fluid overload in the setting of ESRD. Patient had an UF run on 2/19 with 3L pulled.  - Nephro following, appreciate recs  - Strict I/Os    # Anion gap metabolic acidosis  Patient developed new anion gap 2/20, concern for new cellular hypoxia vs starvation ketosis and refeeding syndrome. Less likely toxic ingestion vs medication. Less likely uremia as patient is receiving HD.   - Ketones, lactate ordered  - Monitor refeeding labs BID, may not need to replete aggressively as she is a dialysis patient     Endocrine:  # Low T4  Patient with new low T4 at 0.64 and TSH at 6.5, concerning  for new hypothyroidism vs sick thyroid syndrome. TSH with appropriate response, more consistent with elevation in the setting of acute illness, levothyroxine is not indicated at this time, will monitor for symptom development. Consider repeat testing in outpatient setting once patient no longer acutely ill.      ID:  # Concern for PNA   # Hx EBV viremia   # Hypogammaglobulinemia  # Chronic immunosuppression  Patient has been afebrile and has not had leukocytosis however she is under immunosuppression. Given acute respiratory failure and hx of recurrent pneumonias, she was started on empiric abx for concerns over new pneumonia. RVP and cultures no growth to date. Will plan to treat empirically for HAP for 7 days.   Antibiotics  Zosyn (2/17 -2/23, x7 days)  Vancomycin (2/17 - 2/19)  Dapsone MWF, PJP ppx   Immunosuppression  Tacrolimus > tac level 4.8 2/18  Prednisone daily     Hematology:    # Acute on chronic Anemia 2/2 ESRD  On Venofer injections 50 qwk, Mircera last dose 1/9, 2/18 AM Hgb 6.3 requiring transfusion, repeat Hgb corrected appropriately to 7.5. No evidence of active bleeding, however she has multiple bruises from recent fall.  - Nephrology: Will continue Venofer 50 mcg q week (Tuesday), increase epogen dose from 4000 to 8000 units (starting 2/20)      Musculoskeletal:  # Right hip fracture s/p ORIF (December 2023)   - PT/OT     Skin:  # Facial and neck bruising  -Diligent skin cares     General Cares/Prophylaxis:    DVT Prophylaxis: Heparin SQ  GI Prophylaxis: PPI  Restraints: none  Family Communication: Family updated via phone  Code Status: Full     Lines/tubes/drains:  - CVC RIJ  - PIV x2  - PEG/J  - HD AV fistula     Disposition:  - Medical ICU     Patient seen and findings/plan discussed with medical ICU staff, Dr. Pena.    Reji Porter    Resident/Fellow Attestation   I, Fan Lee MD, was present with the medical/EMILY student who participated in the service and in the documentation of the  note.  I have verified the history and personally performed the physical exam and medical decision making.  I agree with the assessment and plan of care as documented in the note.      Fan Lee MD  PGY2  Date of Service (when I saw the patient): 02/20/24        Clinically Significant Risk Factors         # Hyponatremia: Lowest Na = 129 mmol/L in last 2 days, will monitor as appropriate      # Hypoalbuminemia: Lowest albumin = 2.6 g/dL at 2/18/2024  5:13 AM, will monitor as appropriate             # Severe Malnutrition: based on nutrition assessment      # Financial/Environmental Concerns: none                ====================================  INTERVAL HISTORY:   Did not tolerate BiPAP overnight for more than an hour, VBG worse. Has an appetite and requested to eat. Not reporting any difficulty breathing.     OBJECTIVE:   1. VITAL SIGNS:   Temp:  [97.8  F (36.6  C)-99.4  F (37.4  C)] 98.2  F (36.8  C)  Pulse:  [] 93  Resp:  [13-25] 15  BP: (114-161)/() 138/75  FiO2 (%):  [30 %] 30 %  SpO2:  [91 %-100 %] 92 %  Vent Mode: (S) PS  (Pressure Support)  FiO2 (%): 30 %  Resp Rate (Set): 18 breaths/min  Tidal Volume (Set, mL): 300 mL  PEEP (cm H2O): 5 cmH2O  Pressure Support (cm H2O): 5 cmH2O  Resp: 15    2. INTAKE/ OUTPUT:   I/O last 3 completed shifts:  In: 2412.54 [I.V.:697.54; NG/GT:658]  Out: 3000 [Other:3000]    3. PHYSICAL EXAMINATION:  General: thin, alert and oriented  HEENT: Normocephalic, bruising on the right face around right orbit with hematoma and right neck.  Neuro: NAD, following commands, a&o  Pulm/Resp: clear lungs, no wheezing or crackles, able to clear secretions  CV: RRR, warm extremities, capillary refill < 1 second on upper and lower extremities, 2+ pitting edema in hands and feet  Abdomen: Non-distended, PEG in place without erythema or drainage  Incisions/Skin: Bruising to face and right forearm improved    4. LABS:   Arterial Blood Gases   Recent Labs   Lab 02/17/24  2120   PH  7.25*   PCO2 78*   PO2 34*   HCO3 34*     Complete Blood Count   Recent Labs   Lab 02/20/24 0448 02/19/24  0610 02/18/24  1418 02/18/24  0842 02/17/24  1830   WBC 4.4 3.1*  --  3.0* 4.2   HGB 8.2* 7.1* 7.5* 6.3* 7.8*    146*  --  138* 145*     Basic Metabolic Panel  Recent Labs   Lab 02/20/24 0448 02/19/24  2341 02/19/24  2227 02/19/24  1929 02/19/24  1630 02/19/24  1355 02/19/24  0836 02/19/24  0610 02/18/24  0828 02/18/24  0513 02/17/24 1953 02/17/24  1830   *  --   --   --   --   --   --  129*  --  131*  --  130*   POTASSIUM 3.6  3.6  --  3.7  --   --  4.3  --  4.0  4.0   < > 3.8   < > 3.6   CHLORIDE 93*  --   --   --   --   --   --  93*  --  95*  --  94*   CO2 21*  --   --   --   --   --   --  23  --  30*  --  31*   BUN 40.9*  --   --   --   --   --   --  31.8*  --  21.8  --  17.4   CR 3.13*  --   --   --   --   --   --  2.70*  --  2.17*  --  1.81*   *  136* 117*  --  123*   < >  --    < > 96   < > 106*   < > 131*    < > = values in this interval not displayed.     Liver Function Tests  Recent Labs   Lab 02/20/24 0448 02/19/24  0610 02/18/24  0513 02/17/24  1830 02/14/24  0807 02/13/24  1658 02/13/24  0700   AST 17 14 15 13   < >  --  31   ALT <5 <5 <5 <5   < >  --  8   ALKPHOS 60 53 51 62   < >  --  56   BILITOTAL 0.2 0.2 0.2 0.2   < >  --  0.2   ALBUMIN 3.0* 2.7* 2.6* 3.1*   < >  --  3.1*   INR  --  1.03  --  0.90  --  0.92 0.97    < > = values in this interval not displayed.     Coagulation Profile  Recent Labs   Lab 02/19/24  0610 02/17/24  1830 02/13/24  1658 02/13/24  0700   INR 1.03 0.90 0.92 0.97       5. RADIOLOGY:   No results found for this or any previous visit (from the past 24 hour(s)).

## 2024-02-20 NOTE — PROGRESS NOTES
02/20/24 0800   Appointment Info   Signing Clinician's Name / Credentials (SLP) ALISA Pena student   Student Supervision On-site supervision provided   General Information   Onset of Illness/Injury or Date of Surgery 02/10/24   Referring Physician Malik Burger MD   Patient/Family Therapy Goal Statement (SLP) None   Pertinent History of Current Problem Sofie Rodriguez is a 63 year old female with PMH notable for bilateral lung transplant in 2022 for COPD, ESRD on HD (T/Th/S), gastroparesis s/p PEG/J, severe malnutrition, recent R femoral fracture s/p ORIF in [place]. Admitted 2/10/24 to [facility] for FTT and small bowel dysmotility for port-a-cath placement and TPN initiation. Transferred to ICU evening of 2/17/24 for acute on chronic hypoxic and hypercarbic respiratory failure with severe hypercarbia and AMS requiring intubation, ddx includes pulmonary edema vs infection, possible mixed picture. Clinical swallow eval completed per MD orders.   Type of Evaluation   Type of Evaluation Swallow Evaluation   Oral Motor   Oral Musculature generally intact   Structural Abnormalities none present   Dentition (Oral Motor)   Dentition (Oral Motor) adequate dentition   Facial Symmetry (Oral Motor)   Facial Symmetry (Oral Motor) WNL   Lip Function (Oral Motor)   Lip Range of Motion (Oral Motor) WNL   Tongue Function (Oral Motor)   Tongue ROM (Oral Motor) WNL   Jaw Function (Oral Motor)   Jaw Function (Oral Motor) WNL   Cough/Swallow/Gag Reflex (Oral Motor)   Soft Palate/Velum (Oral Motor) WNL   Volitional Throat Clear/Cough (Oral Motor) WNL   Vocal Quality/Secretion Management (Oral Motor)   Vocal Quality (Oral Motor) wet/gurgly   Secretion Management (Oral Motor) wet/gurgly vocal quality   Comment, Vocal Quality/Secretion Management (Oral Motor) Mild congestion at baseline   General Swallowing Observations   Current Diet/Method of Nutritional Intake (General Swallowing Observations, NIS) NPO   Past History of  Dysphagia Pt's dysphagia hx significant for FEES completed 7/23 which demonsrated intermittent shallow to deep penetration with thin and mildly-thick liquids via straw during the swallow, resulting in minimal residue in laryngeal vestibule. Pt able to clear this residue with use of cued throat clear-swallow. No aspiration or penetration with puree or solid textures. A regular diet and thin liquids was recommended at the time.   Respiratory Support room air   Clinical Swallow Evaluation   Feeding Assistance no assistance needed   Clinical Swallow Evaluation Textures Trialed thin liquids;pureed;solid foods   Clinical Swallow Eval: Thin Liquid Texture Trial   Mode of Presentation, Thin Liquids cup;straw;self-fed;spoon   Volume of Liquid or Food Presented 2 ice chips; 3 oz of water   Oral Phase of Swallow WFL   Pharyngeal Phase of Swallow intact   Diagnostic Statement No overt s/sx of aspiration with thin liquids   Clinical Swallow Evaluation: Puree Solid Texture Trial   Mode of Presentation, Puree spoon;self-fed   Volume of Puree Presented 2 tbsp   Oral Phase, Puree WFL   Pharyngeal Phase, Puree intact   Diagnostic Statement No overt s/sx of apsiration with pureed textures. No oral residue noted   Clinical Swallow Evaluation: Solid Food Texture Trial   Mode of Presentation self-fed   Volume Presented 1 single cracker   Oral Phase WFL   Pharyngeal Phase throat clearing   Diagnostic Statement Overt s/sx of aspiration marked by throat clearing x1; however, no other overt s/sx of aspiration. Pt demonstrated mild oral residuals; pt able to clear majority of residue when cued to complete a liquid wash   Esophageal Phase of Swallow   Esophageal comments Pt with a hx of gastroparesis   Swallowing Recommendations   Diet Consistency Recommendations thin liquids (level 0);regular diet   Supervision Level for Intake distant supervision needed   Mode of Delivery Recommendations bolus size, small;slow rate of intake   Postural  Recommendations none   Swallowing Maneuver Recommendations alternate food and liquid intake   Monitoring/Assistance Required (Eating/Swallowing) stop eating activities when fatigue is present;monitor for cough or change in vocal quality with intake   Recommended Feeding/Eating Techniques (Swallow Eval) maintain upright sitting position for eating;minimize distractions during oral intake   Instrumental Assessment Recommendations instrumental evaluation not recommended at this time   General Therapy Interventions   Planned Therapy Interventions Dysphagia Treatment   Dysphagia treatment Instruction of safe swallow strategies   Clinical Impression   Criteria for Skilled Therapeutic Interventions Met (SLP Eval) Evaluation only   SLP Diagnosis Pt demonstrating functional oropharyngeal swallowing skills   Risks & Benefits of therapy have been explained evaluation/treatment results reviewed;care plan/treatment goals reviewed;risks/benefits reviewed;current/potential barriers reviewed;participants voiced agreement with care plan;participants included;patient   Clinical Impression Comments Clinical swallow evaluation completed per MD orders. Pt presenting with functional oropharyngeal swallowing skills s/p extubation. Oral cares completed. Clinician noted mild baseline congestion; otherwise, oral mech exam unremarkable. Pt assessed with ice chips, thin liquids, and pureed textures with no overt s/sx of aspiration. Regular solid textures resulted in overt s/sx of aspiration marked by throat clearing x1 and mild oral residuals; however, no other overt s/sx of aspiration. Pt was able to clear oral residuals with cues to complete liquid wash. Pt required prolonged but functional time for mastication. Recommend a regular diet and thin liquids. Pt should complete liquid washes with dry solids (e.g., bread, crackers). Pt should be upright and alert for all PO, take small bites/sips, alternate between consistencies, and slow pacing.  No additional ST indicated at this time. Will complete orders.   SLP Discharge Recommendation home with assist   SLP Rationale for DC Rec Pt demonstrating functional oropphrayngeal swallowing skills   SLP Brief overview of current status  Recommend a regular diet and thin liquids. Pt should be fully upright and alert for all PO, take small bites/sips, slow pacing, and alternate between consistencies. Pt should complete frequent liquid washses with dry solids (e.g., crackers, bread).   Total Session Time   Total Session Time (sum of timed and untimed services) 11   Psychosocial Support   Trust Relationship/Rapport care explained;choices provided;questions answered

## 2024-02-20 NOTE — PROGRESS NOTES
Nephrology Progress Note  02/20/2024         Assessment & Recommendations:   Sofie Rodriguez is a 63 year old year old female with ESKD, COPD s/p b/l lung transplant in 6/2022 with multiple complications, gastroparesis s/p GJ tube, GIB, chronic diarrhea, recurrent c-diff, FTT with inability to tolerate any tube feeds, R hip fx s/p ORIF 12/2023, admitted on 2/10 for initiation of TPN/lipids, transferred to ICU on 2/17 with worsening mental status and respiratory acidosis in spite of bipap, ultimately intubated on 2/18, being treated for PNA, also with volume overload in setting of high volume TPN.     # ESKD - TTS, LUE AVG, 3hr, 45.5kg, [facility], Dr. Andres Fairbanks. She has been losing weight and now even below her recent set EDW.  - extra UF run yesterday for fluid optimization  - HD again today per TTS schedule    # Hypoxic respiratory failure: intubated 2/18 in setting of hypercapnia in spite of bipap, extubated 2/19  - ongoing volume off  - on zosyn for possible PNA    # Volume/BP: Edema due to third spacing due to hypoalbuminemia. Anuric; on metoprolol soln 25 mg bid  - volume overload on CT chest scan and on exam with 2+ pitting edema   - wt went from 43.4 kg on admission to 47.1 kg with TPN  - Please wrap and elevate her legs  - on TPN since 2/13/24  - please chart volume of TPN in the I/O and limit TPN to < 1.5 L per day    # FTT  # Chronic diarrhea  Working-up currently. Not on any cellcept.      # Anemia 2/2 ESKD  On Venofer 50 qwk, Mircera last dose 1/9/2024  - hgb 7's s/p transfusion 2/18  - Will continue Venofer 50 mcg q week (Tuesday)  - increase epogen dose from 4000 to 8000 units (starting 2/20)    # BMD : Ca 8's, phos 4.1, alb 3.0  - renvela is held    Recommendations were communicated to primary team via  note     RABIA Moctezuma   Division of Renal Disease and Hypertension  [phone]    Interval History :   Seen bedside, extubated yesterday, doing much better today. Will pull 3L  "again today. Hopefully In's will come down today and stabilize at a manageable volume. No n/v, CP, SOB, chills.    Review of Systems:   4 point ROS neg other than as noted above    Physical Exam:   I/O last 3 completed shifts:  In: 2764.43 [I.V.:632.75; NG/GT:718]  Out: 3200 [Other:3200]   BP (!) 140/81 (BP Location: Right arm)   Pulse 98   Temp 99.4  F (37.4  C) (Oral)   Resp 22   Ht 1.57 m (5' 1.81\")   Wt 47.4 kg (104 lb 9.6 oz)   SpO2 94%   BMI 19.25 kg/m       GENERAL APPEARANCE: alert  PULM: CTA  CV: regular rhythm, normal rate, no rub     -2+ pitting edema   GI: soft, non tender, no distended, bowel sounds are present  INTEGUMENT: no cyanosis, no rash  NEURO: a/o3  Access Left AVG     Labs:   All labs reviewed by me  Electrolytes/Renal -   Recent Labs   Lab Test 02/20/24  1136 02/20/24  0737 02/20/24  0448 02/19/24  2341 02/19/24  2227 02/19/24  1630 02/19/24  1355 02/19/24  0836 02/19/24  0610 02/18/24  0828 02/18/24  0513   NA  --   --  131*  --   --   --   --   --  129*  --  131*   POTASSIUM  --   --  3.6  3.6  --  3.7  --  4.3  --  4.0  4.0   < > 3.8   CHLORIDE  --   --  93*  --   --   --   --   --  93*  --  95*   CO2  --   --  21*  --   --   --   --   --  23  --  30*   BUN  --   --  40.9*  --   --   --   --   --  31.8*  --  21.8   CR  --   --  3.13*  --   --   --   --   --  2.70*  --  2.17*   * 121* 131*  136*   < >  --    < >  --    < > 96   < > 106*   ESTUARDO  --   --  8.9  --   --   --   --   --  8.2*  --  8.0*   MAG  --   --  2.3  --  2.3  --  2.4*  --  2.1   < > 2.0   PHOS  --   --  4.1  --  3.9  --  3.6  --  2.4*   < > 1.1*    < > = values in this interval not displayed.       CBC -   Recent Labs   Lab Test 02/20/24 0448 02/19/24  0610 02/18/24  1418 02/18/24  0842   WBC 4.4 3.1*  --  3.0*   HGB 8.2* 7.1* 7.5* 6.3*    146*  --  138*       LFTs -   Recent Labs   Lab Test 02/20/24 0448 02/19/24  0610 02/18/24  0513   ALKPHOS 60 53 51   BILITOTAL 0.2 0.2 0.2   ALT <5 <5 <5   AST " 17 14 15   PROTTOTAL 5.2* 4.6* 4.5*   ALBUMIN 3.0* 2.7* 2.6*       Iron Panel -   Recent Labs   Lab Test 09/26/22  0555 09/03/22  1039 08/24/22  0810   IRON 54 21* 41   IRONSAT 22 9* 21   CARLOS 769* 343* 334*         Imaging:  All imaging studies reviewed by me.     Current Medications:   acetaminophen  650 mg Oral or Feeding Tube Q6H    calcium carbonate-vitamin D  1 tablet Per J Tube TID w/meals    cyanocobalamin  500 mcg Per Feeding Tube Daily    dapsone  50 mg Per J Tube Q Mon Wed Fri AM    heparin ANTICOAGULANT  5,000 Units Subcutaneous Q12H    insulin aspart  1-4 Units Subcutaneous Q4H    lipids plant base  250 mL Intravenous Once per day on Monday Wednesday Friday    [Held by provider] metoprolol  25 mg Per J Tube BID    micafungin  100 mg Intravenous Q24H    pantoprazole  40 mg Per J Tube BID    piperacillin-tazobactam  2.25 g Intravenous Q6H    predniSONE  5 mg Per J Tube QAM    And    predniSONE  2.5 mg Per J Tube QPM    [Held by provider] sevelamer carbonate (RENVELA)  0.8 g Oral BID    tacrolimus  4 mg Per J Tube QAM    And    tacrolimus  4.5 mg Per J Tube QPM      dextrose      dextrose      heparin (porcine)      parenteral nutrition - ADULT compounded formula 43 mL/hr at 02/20/24 0700    sodium chloride 0.9%      - MEDICATION INSTRUCTIONS -      - MEDICATION INSTRUCTIONS -       RABIA Moctezuma

## 2024-02-20 NOTE — PROGRESS NOTES
Medicine Transfer Progress note      [facility]  Medicine Service, [facility] TEAM 1    Date of Hospital Admission: 2/10/2024  Date of ICU Admission: 2/18/2024  Date of Transfer to Medicine Floor: 2/20/2024  Date of Service (when I saw the patient): 02/20/2024        Assessment & Plan  Sofie oRdriguez is a 63 year old female with PMH COPD s/p bilateral lung transplant 6/28/22 c/b hemidiaphragm palsy and recurrent pneumonias, gastroparesis and small bowel dysmotility complicated by severe malnutrition now s/p PEG/J, ESRD on T/Th/Sat HD, recent R femoral fx s/p ORIF, chronic diarrhea, recurrent c-diff, FTT with inability to tolerate any tube feeds, who was admitted to [facility] on 2/10/24 for concerns over malnutrition and TPN initiation via portacath. On 2/17/24 she was transferred to ICU for worsening mental status and acute hypoxic and hypercarbic respiratory failure inspite of BiPAP requiring intubation. She was suspected to have PNA and started on antibiotics. Extubated on 2/19 without complications, now on RA, tolerated iHD on 2/20, and tolerating PO diet.         Changes today:   - ID transplant consult   - Repeat CBC. BMP, VBG in the AM  - BID feeding labs, trend and replace lytes as needed  - Continue Zosyn  - Continue micafungin ( as per pulm)  - Continue zofran PRN if n/v  - Daily weights   - Will re-involve GI tomorrow re: nutrition planning (TPN? Vs PO + trickle tube feeds?)  - Tacro level on 2/21  - EBV ordered - pending   - US LUE arterial to evaluate for stenosis or other vascular issue with fistula contributing to left arm edema         # Respiratory acidosis  # Acute hypoxic and hypercarbic respiratory failure  # S/P Lung transplant 2022  # Recurrent pneumonia     Patient received a bilateral lung transplant 6/28/22 for COPD. Was admitted 2/10 to address malnutrition   and admitted to ICU on 2/17 with hypoxic hypercarbic respiratory failure.  Intubated on 2/18 AM  due to worsening oxygen requirements, likely due to pulmonary edema given hx of ESRD requiring HD vs infection given hx of lung transplant, immunosuppressed status, and recurrent pneumonias. Extubated on 2/19 without complications,   Micro Follow-up on BAL, viral panel, CMV, fungus, and Blood Cultures show no abnormalities. Currently on room air and denies any SOB, cough, chest pain, fever, chills.      - PRN hypertonic saline inhaler with albuterol nebs  - Continue good pulm toilet  - Transplant pulmonology following, recs appreciated  - PTA immunosuppressive agent  >Prednisone 5 mg every morning, 2.5 mg every afternoon; tacrolimus 4 mg every morning, 4mg every afternoon  > Monitor tacro levels, goal 7-9  - PTA dapsone MWF for PJP prophylaxis  - Continue zosyn for 7 days for empiric HAP course (2/17 - 2-23)  - Initial decompensation likely from opioids - limit medications that would depress respiration   - will consult transplant ID per transplant pulm recs      Antibiotics:    Vancomycin (2/17 - 2/17)  Zosyn (2/17 - )  Dapsone MWF, PJP ppx     Immunosuppression  Tacrolimus  Prednisone     # Severe malnutrition   # FTT   # Gastroparesis, small bowel dysmotility  # S/P PEG/J with intolerance of enteral nutrition     Patient with gastroparesis (presumed due to vagal injury) and small bowel dysmotility complicated by unintentional 40lb weight loss over the past year and now severe malnutrition. Previously intolerant to oral food intake due to nausea. Was initially admitted for portacath and TPN initiation since 2/13. After extubation has been able to tolerate feeds on 2/20. ( ate burger and tator tots for breakfast this morning and ordered lunch this afternoon)   - Continue calorie count.  - Continue TPN, trickle feeds (nutrition consulted)               - Nephrology: limit fluid < 1.5 L per day for TPN ( chart vol of TPN in the I/O)  - Continue BID refeeding labs  - will re-involve GI tomorrow  re: nutrition planning (TPN vs PO/trickle tube feeds) and additional workup (?if needing CT enterography?)      # Hypoalbuminemia  # Left upper extremity unilateral edema  # Bilateral pedal and ankle edema  Edema due to third spacing due to hypoalbuminemia vs HF. BNP >89911 at admission, Echo on 2/18 shows EF of 55-60% with IVC of <2.1 and collapsing >50% with sniff, normal RA pressure, no significant valvular abnormalities She is Anuric; Reportedly had Left upper extremity swelling and 2+ pitting lowe extremity edema likely due to hypoalbuminemia. Upper limb duplex USG on 2/18 negative for DVT. Wt went from 43.4 kg on admission to 47.1 kg with TPN. Patient reports mild pain but denies tingling and numbness in the arms, No axillary LAD and no significant redness/ changes at the fistula site. Discussed left arm edema with nephrology - avoid wrapping (dialysis fistula arm) and perform US dedicated to assessing dialysis fistula.   - Continue daily weights  - Strict I/Os  - Discussed with nephrology - will eval for issues with fistula given no DVT on prior US on 2/18  - US left upper extremity arterial to eval dialysis fistula for stenosis and flows per nephrology  - Elevation and wrapping of only lower legs; no wrapping of LUE with dialysis fistula        # Anion gap metabolic acidosis   # ESRD on HD T/Th/Sat  # Acute on chronic Anemia 2/2 ESRD   # Hypervolemic hyponatremia  Patient is ESRD on T/Th/Sat HD, Hgb stabilizing after the transfusion. New Anion gap metabolic acidosis noted this AM. Concern for new cellular hypoxia vs starvation ketosis and refeeding syndrome. Less likely toxic ingestion vs medication. Less likely uremia as patient is receiving HD. Currently undergoing Dialysis today. Will monitor CBP, electrolytes after dialysis.   - Continue Venofer injections    - CBP and CMP in the AM for Hb and elcetrolytes  - Continue epogen dose 8000 units as per nephrology  - Strict I/Os  - HD per nephrology      #  HTN  # A-fib, resolved  Noted atrial fibrillation on arrival with HR elevated at 150, not sustained for > 10 minutes and otherwise hemodynamically stable. Rates stable in the 70's.  - PTA metoprolol 25mg BID held given recent soft Bps in ICU, will reassess for resumption tomorrow  - Continuous telemetry      # Encephalopathy secondary to hypercarbia - resolved    Patient was progressively more lethargic on 2/17 during admission in [facility]. Etiology likely secondary to hypercarbia in context of respiratory failure as patient was noted to have high CO2s concomitant with lethargy. Though receiving oxycodone and fentanyl, lethargy was only partially alleviated by narcan. LFTs and ammonia wnl with low suspicion of hepatic encephalopathy. BUN wnl with low suspicion for uremic encephalopathy. Head CT on 2/18 was negative with low suspicion of acute intracranial pathology. Patient is now awake, alert, oriented and following commands as of 2/20.       # Facial and neck bruising  # Pain  Reports soreness in her neck from lying in bed. No soreness in the buttocks/ back.Patient has multiple bruises over her arms and face which is attributed to previous falls.   -Lidocaine patch prn  -Diligent skin cares      # Right hip fracture s/p ORIF (December 2023)   - PT/OT    # GERD  - PTA PPI    # Low T4  Patient with new low T4 at 0.64 and TSH at 6.5, concerning for new hypothyroidism vs sick thyroid syndrome. TSH with appropriate response, more consistent with elevation in the setting of acute illness, levothyroxine is not indicated at this time, will monitor for symptom development. Consider repeat testing in outpatient setting once patient no longer acutely ill.       # Hx EBV viremia   # Hypogammaglobulinemia  # Chronic immunosuppression  Patient has been afebrile and has not had leukocytosis however she is under immunosuppression. Given acute respiratory failure and hx of recurrent pneumonias, she was started on empiric  abx for concerns over new pneumonia. RVP and cultures no growth to date. Will plan to treat empirically for HAP for 7 days.    - EBV ordered - pending       General Cares/Prophylaxis:    DVT Prophylaxis: Heparin SQ  GI Prophylaxis: PPI  Restraints: none  Family Communication: Update daughters Charity and Julia and sister Silvia  Code Status: Full    Lines/tubes/drains:  - CVC RIJ  - PIV x2  - PEG/J  - HD AV fistula        Clinically Significant Risk Factors         # Hyponatremia: Lowest Na = 129 mmol/L in last 2 days, will monitor as appropriate      # Hypoalbuminemia: Lowest albumin = 2.6 g/dL at 2/18/2024  5:13 AM, will monitor as appropriate             # Severe Malnutrition: based on nutrition assessment    # Financial/Environmental Concerns: none                Disposition Plan       Expected Discharge Date: 02/22/2024      Destination: home with help/services  Discharge Comments:           The patient's care was discussed with the Attending Physician, Dr. Jayne Escobar MD     DP, Medical Observer  Medicine Service, [address]  Securely message with Vocera (more info)  Text page via [employer] Paging/Directory   See signed in provider for up to date coverage information      Resident/Fellow Attestation   I, Jannet Isaac MD, was present with the medical/EMILY student who participated in the service and in the documentation of the note.  I have verified the history and personally performed the physical exam and medical decision making.  I have edited the above note as needed. I agree with the assessment and plan of care as documented in the note.        Jannet Isaac MD  PGY3  Date of Service (when I saw the patient): 02/20/24     ______________________________________________________________________        Interval History  No acute events since handed over by the MICU. Pt reports improvement in cough frequency after extubation. No productive / blood  "tinged sputum. Started eating today with breakfast ( ate burger and tator tots for breakfast this morning and ordered lunch this afternoon). Continues to tolerate trickle tube feeds and TPN. Her left hand and arm swelling has been persistent, and she reports it is painful. She does not usually have left arm or bilateral leg swelling; notes bilateral ankle and feet swelling is also new for her since her admission, however has been stable over past couple of days. Overall, she feels better than yesterday. Denies n/v hematemesis, abdominal pain.         Physical Exam  /76 (BP Location: Right arm)   Pulse 97   Temp 98.8  F (37.1  C) (Oral)   Resp 18   Ht 1.57 m (5' 1.81\")   Wt 47.4 kg (104 lb 9.6 oz)   SpO2 95%   BMI 19.25 kg/m         General: thin, alert and oriented  HEENT: Normocephalic, bruising on the right face around right orbit with hematoma and right neck.  Neuro: NAD, following commands, a&o  Pulm/Resp: clear lungs, no wheezing or crackles, able to clear secretions, no cough during exam  CV: RRR, warm extremities, capillary refill < 1 second on upper and lower extremities, 2+ pitting edema in left hand and entirety of left arm, no edema in right arm or hand, and 2+ pitting edema from bilateral ankles to feet, peripheral pulses in radial, DP, PT intact  Abdomen: Non-distended, PEG in place without erythema or drainage  Incisions/Skin: Bruising to face and right forearm, oozing around dialysis access on left upper forearm/fistula    Labs and Imaging for this encounter reviewed in chart review.      "

## 2024-02-20 NOTE — PROGRESS NOTES
dgedHEMODIALYSIS TREATMENT NOTE    Date: 2/20/2024  Time: 4.00 PM    Data:  Pre Wt: 47.4 kg (104 lb 8 oz)   Desired Wt: 3.0  kg   Post Wt: 44.4 kg (105 lb 2.6 oz)  Weight change: 3.0 kg  Ultrafiltration - Post Run Net Total Removed (mL): 3000 mL  Vascular Access Status: Graft  patent  Dialyzer Rinse: Streaked, Light  Total Blood Volume Processed: 80.1 L   Total Dialysis (Treatment) Time: 3.5   Dialysate Bath: K 3, Ca 2.25  Heparin: None    Lab:   No    Interventions:  Pt had scheduled 3.5 hours Hd through LAVG, lidocaine cream applied for 15 minutes prior cannulation.  AVG thrill & bruit present, 2 15 g needle cannulated successful.  with 600 DFR.  After 30 minutes of treatment initiation, Venous pressure suddenly increased >300, needle dislodged, pt rinsed back successfully, system re-circulated & treatment reinitiated after 25 minutes.  Epogen & iron sucrose given per prescription.  Pt hemodynamically stable throughout the treatment, 3.0kg UF removed, Crit-line steady with A profile at the end of HD run.  Post HD, blood rinsed back, Graft hemostasis took more than 10 minutes, clean dressing applied & handoff report given.      Assessment:  Pt alert & oriented x4, denies pain, calm and co-operative.  AVG thrill & bruit present. Edematous with skin bruises  Monitoring every 15 minutes & PRN     Plan:    Per Renal  Team

## 2024-02-21 ENCOUNTER — APPOINTMENT (OUTPATIENT)
Dept: ULTRASOUND IMAGING | Facility: CLINIC | Age: 64
DRG: 640 | End: 2024-02-21
Payer: MEDICARE

## 2024-02-21 ENCOUNTER — APPOINTMENT (OUTPATIENT)
Dept: OCCUPATIONAL THERAPY | Facility: CLINIC | Age: 64
DRG: 640 | End: 2024-02-21
Attending: INTERNAL MEDICINE
Payer: MEDICARE

## 2024-02-21 ENCOUNTER — APPOINTMENT (OUTPATIENT)
Dept: PHYSICAL THERAPY | Facility: CLINIC | Age: 64
DRG: 640 | End: 2024-02-21
Attending: INTERNAL MEDICINE
Payer: MEDICARE

## 2024-02-21 LAB
ALBUMIN SERPL BCG-MCNC: 2.9 G/DL (ref 3.5–5.2)
ALP SERPL-CCNC: 64 U/L (ref 40–150)
ALT SERPL W P-5'-P-CCNC: <5 U/L (ref 0–50)
ANION GAP SERPL CALCULATED.3IONS-SCNC: 11 MMOL/L (ref 7–15)
AST SERPL W P-5'-P-CCNC: 15 U/L (ref 0–45)
BASE EXCESS BLDV CALC-SCNC: 1.3 MMOL/L (ref -3–3)
BASOPHILS # BLD AUTO: ABNORMAL 10*3/UL
BASOPHILS # BLD MANUAL: 0.1 10E3/UL (ref 0–0.2)
BASOPHILS NFR BLD AUTO: ABNORMAL %
BASOPHILS NFR BLD MANUAL: 2 %
BILIRUB SERPL-MCNC: 0.2 MG/DL
BUN SERPL-MCNC: 24.1 MG/DL (ref 8–23)
BUN SERPL-MCNC: 24.1 MG/DL (ref 8–23)
BUN SERPL-MCNC: 35.5 MG/DL (ref 8–23)
C DIFF TOX B STL QL: NEGATIVE
CALCIUM SERPL-MCNC: 8.4 MG/DL (ref 8.8–10.2)
CALCIUM SERPL-MCNC: 8.4 MG/DL (ref 8.8–10.2)
CALCIUM SERPL-MCNC: 8.7 MG/DL (ref 8.8–10.2)
CHLORIDE SERPL-SCNC: 101 MMOL/L (ref 98–107)
CHLORIDE SERPL-SCNC: 97 MMOL/L (ref 98–107)
CHLORIDE SERPL-SCNC: 97 MMOL/L (ref 98–107)
CREAT SERPL-MCNC: 2.06 MG/DL (ref 0.51–0.95)
CREAT SERPL-MCNC: 2.06 MG/DL (ref 0.51–0.95)
CREAT SERPL-MCNC: 2.48 MG/DL (ref 0.51–0.95)
DEPRECATED HCO3 PLAS-SCNC: 24 MMOL/L (ref 22–29)
DEPRECATED HCO3 PLAS-SCNC: 25 MMOL/L (ref 22–29)
DEPRECATED HCO3 PLAS-SCNC: 25 MMOL/L (ref 22–29)
EGFRCR SERPLBLD CKD-EPI 2021: 21 ML/MIN/1.73M2
EGFRCR SERPLBLD CKD-EPI 2021: 27 ML/MIN/1.73M2
EGFRCR SERPLBLD CKD-EPI 2021: 27 ML/MIN/1.73M2
EOSINOPHIL # BLD AUTO: ABNORMAL 10*3/UL
EOSINOPHIL # BLD MANUAL: 0.1 10E3/UL (ref 0–0.7)
EOSINOPHIL NFR BLD AUTO: ABNORMAL %
EOSINOPHIL NFR BLD MANUAL: 3 %
ERYTHROCYTE [DISTWIDTH] IN BLOOD BY AUTOMATED COUNT: 20 % (ref 10–15)
GLUCOSE BLDC GLUCOMTR-MCNC: 107 MG/DL (ref 70–99)
GLUCOSE BLDC GLUCOMTR-MCNC: 121 MG/DL (ref 70–99)
GLUCOSE BLDC GLUCOMTR-MCNC: 125 MG/DL (ref 70–99)
GLUCOSE BLDC GLUCOMTR-MCNC: 137 MG/DL (ref 70–99)
GLUCOSE BLDC GLUCOMTR-MCNC: 144 MG/DL (ref 70–99)
GLUCOSE SERPL-MCNC: 120 MG/DL (ref 70–99)
GLUCOSE SERPL-MCNC: 122 MG/DL (ref 70–99)
GLUCOSE SERPL-MCNC: 122 MG/DL (ref 70–99)
HCO3 BLDV-SCNC: 28 MMOL/L (ref 21–28)
HCT VFR BLD AUTO: 24.6 % (ref 35–47)
HGB BLD-MCNC: 7.8 G/DL (ref 11.7–15.7)
IMM GRANULOCYTES # BLD: ABNORMAL 10*3/UL
IMM GRANULOCYTES NFR BLD: ABNORMAL %
LYMPHOCYTES # BLD AUTO: ABNORMAL 10*3/UL
LYMPHOCYTES # BLD MANUAL: 0.9 10E3/UL (ref 0.8–5.3)
LYMPHOCYTES NFR BLD AUTO: ABNORMAL %
LYMPHOCYTES NFR BLD MANUAL: 25 %
MAGNESIUM SERPL-MCNC: 1.9 MG/DL (ref 1.7–2.3)
MAGNESIUM SERPL-MCNC: 2 MG/DL (ref 1.7–2.3)
MAGNESIUM SERPL-MCNC: 2 MG/DL (ref 1.7–2.3)
MCH RBC QN AUTO: 36.1 PG (ref 26.5–33)
MCHC RBC AUTO-ENTMCNC: 31.7 G/DL (ref 31.5–36.5)
MCV RBC AUTO: 114 FL (ref 78–100)
MONOCYTES # BLD AUTO: ABNORMAL 10*3/UL
MONOCYTES # BLD MANUAL: 0.9 10E3/UL (ref 0–1.3)
MONOCYTES NFR BLD AUTO: ABNORMAL %
MONOCYTES NFR BLD MANUAL: 26 %
NEUTROPHILS # BLD AUTO: ABNORMAL 10*3/UL
NEUTROPHILS # BLD MANUAL: 1.5 10E3/UL (ref 1.6–8.3)
NEUTROPHILS NFR BLD AUTO: ABNORMAL %
NEUTROPHILS NFR BLD MANUAL: 43 %
NRBC # BLD AUTO: 0 10E3/UL
NRBC # BLD AUTO: 0 10E3/UL
NRBC BLD AUTO-RTO: 0 /100
NRBC BLD MANUAL-RTO: 1 %
O2/TOTAL GAS SETTING VFR VENT: 21 %
OXYHGB MFR BLDV: 90 % (ref 70–75)
PATH REPORT.COMMENTS IMP SPEC: NORMAL
PATH REPORT.FINAL DX SPEC: NORMAL
PATH REPORT.MICROSCOPIC SPEC OTHER STN: NORMAL
PATH REPORT.RELEVANT HX SPEC: NORMAL
PCO2 BLDV: 56 MM HG (ref 40–50)
PH BLDV: 7.31 [PH] (ref 7.32–7.43)
PHOSPHATE SERPL-MCNC: 2.7 MG/DL (ref 2.5–4.5)
PHOSPHATE SERPL-MCNC: 2.8 MG/DL (ref 2.5–4.5)
PHOSPHATE SERPL-MCNC: 2.8 MG/DL (ref 2.5–4.5)
PLAT MORPH BLD: ABNORMAL
PLATELET # BLD AUTO: 149 10E3/UL (ref 150–450)
PO2 BLDV: 62 MM HG (ref 25–47)
POTASSIUM SERPL-SCNC: 3.4 MMOL/L (ref 3.4–5.3)
POTASSIUM SERPL-SCNC: 3.5 MMOL/L (ref 3.4–5.3)
PROMYELOCYTES # BLD MANUAL: 0 10E3/UL
PROMYELOCYTES NFR BLD MANUAL: 1 %
PROT SERPL-MCNC: 4.9 G/DL (ref 6.4–8.3)
RBC # BLD AUTO: 2.16 10E6/UL (ref 3.8–5.2)
RBC MORPH BLD: ABNORMAL
SAO2 % BLDV: 91.1 % (ref 70–75)
SCANNED LAB RESULT: ABNORMAL
SODIUM SERPL-SCNC: 133 MMOL/L (ref 135–145)
SODIUM SERPL-SCNC: 133 MMOL/L (ref 135–145)
SODIUM SERPL-SCNC: 136 MMOL/L (ref 135–145)
TACROLIMUS BLD-MCNC: 7.2 UG/L (ref 5–15)
TME LAST DOSE: NORMAL H
TME LAST DOSE: NORMAL H
WBC # BLD AUTO: 3.4 10E3/UL (ref 4–11)

## 2024-02-21 PROCEDURE — 87493 C DIFF AMPLIFIED PROBE: CPT

## 2024-02-21 PROCEDURE — 97116 GAIT TRAINING THERAPY: CPT | Mod: GP

## 2024-02-21 PROCEDURE — 80197 ASSAY OF TACROLIMUS: CPT | Performed by: STUDENT IN AN ORGANIZED HEALTH CARE EDUCATION/TRAINING PROGRAM

## 2024-02-21 PROCEDURE — 85014 HEMATOCRIT: CPT

## 2024-02-21 PROCEDURE — 83735 ASSAY OF MAGNESIUM: CPT

## 2024-02-21 PROCEDURE — 250N000013 HC RX MED GY IP 250 OP 250 PS 637

## 2024-02-21 PROCEDURE — 80053 COMPREHEN METABOLIC PANEL: CPT | Performed by: INTERNAL MEDICINE

## 2024-02-21 PROCEDURE — 250N000013 HC RX MED GY IP 250 OP 250 PS 637: Performed by: STUDENT IN AN ORGANIZED HEALTH CARE EDUCATION/TRAINING PROGRAM

## 2024-02-21 PROCEDURE — 250N000009 HC RX 250: Performed by: STUDENT IN AN ORGANIZED HEALTH CARE EDUCATION/TRAINING PROGRAM

## 2024-02-21 PROCEDURE — 99232 SBSQ HOSP IP/OBS MODERATE 35: CPT | Mod: GC | Performed by: STUDENT IN AN ORGANIZED HEALTH CARE EDUCATION/TRAINING PROGRAM

## 2024-02-21 PROCEDURE — 999N000157 HC STATISTIC RCP TIME EA 10 MIN

## 2024-02-21 PROCEDURE — 250N000009 HC RX 250: Performed by: INTERNAL MEDICINE

## 2024-02-21 PROCEDURE — 250N000011 HC RX IP 250 OP 636: Performed by: INTERNAL MEDICINE

## 2024-02-21 PROCEDURE — 84100 ASSAY OF PHOSPHORUS: CPT

## 2024-02-21 PROCEDURE — 93990 DOPPLER FLOW TESTING: CPT

## 2024-02-21 PROCEDURE — 97110 THERAPEUTIC EXERCISES: CPT | Mod: GO | Performed by: OCCUPATIONAL THERAPIST

## 2024-02-21 PROCEDURE — 82805 BLOOD GASES W/O2 SATURATION: CPT

## 2024-02-21 PROCEDURE — 120N000011 HC R&B TRANSPLANT UMMC

## 2024-02-21 PROCEDURE — 250N000009 HC RX 250

## 2024-02-21 PROCEDURE — 99233 SBSQ HOSP IP/OBS HIGH 50: CPT | Performed by: INTERNAL MEDICINE

## 2024-02-21 PROCEDURE — 250N000012 HC RX MED GY IP 250 OP 636 PS 637: Performed by: INTERNAL MEDICINE

## 2024-02-21 PROCEDURE — 93990 DOPPLER FLOW TESTING: CPT | Mod: 26 | Performed by: RADIOLOGY

## 2024-02-21 PROCEDURE — 250N000011 HC RX IP 250 OP 636: Mod: JZ

## 2024-02-21 PROCEDURE — 250N000011 HC RX IP 250 OP 636

## 2024-02-21 PROCEDURE — 36415 COLL VENOUS BLD VENIPUNCTURE: CPT

## 2024-02-21 PROCEDURE — 80048 BASIC METABOLIC PNL TOTAL CA: CPT | Performed by: NURSE PRACTITIONER

## 2024-02-21 PROCEDURE — B4185 PARENTERAL SOL 10 GM LIPIDS: HCPCS | Mod: JZ | Performed by: INTERNAL MEDICINE

## 2024-02-21 PROCEDURE — 250N000013 HC RX MED GY IP 250 OP 250 PS 637: Performed by: INTERNAL MEDICINE

## 2024-02-21 PROCEDURE — 99222 1ST HOSP IP/OBS MODERATE 55: CPT | Performed by: INTERNAL MEDICINE

## 2024-02-21 PROCEDURE — 84132 ASSAY OF SERUM POTASSIUM: CPT

## 2024-02-21 PROCEDURE — 258N000003 HC RX IP 258 OP 636

## 2024-02-21 PROCEDURE — 84100 ASSAY OF PHOSPHORUS: CPT | Performed by: NURSE PRACTITIONER

## 2024-02-21 PROCEDURE — 85007 BL SMEAR W/DIFF WBC COUNT: CPT

## 2024-02-21 PROCEDURE — 87040 BLOOD CULTURE FOR BACTERIA: CPT

## 2024-02-21 PROCEDURE — 250N000009 HC RX 250: Mod: JZ | Performed by: INTERNAL MEDICINE

## 2024-02-21 PROCEDURE — 82310 ASSAY OF CALCIUM: CPT

## 2024-02-21 PROCEDURE — 97530 THERAPEUTIC ACTIVITIES: CPT | Mod: GP

## 2024-02-21 PROCEDURE — 97535 SELF CARE MNGMENT TRAINING: CPT | Mod: GO | Performed by: OCCUPATIONAL THERAPIST

## 2024-02-21 PROCEDURE — 83735 ASSAY OF MAGNESIUM: CPT | Performed by: NURSE PRACTITIONER

## 2024-02-21 RX ADMIN — PIPERACILLIN AND TAZOBACTAM 2.25 G: 2; .25 INJECTION, POWDER, FOR SOLUTION INTRAVENOUS at 04:08

## 2024-02-21 RX ADMIN — PREDNISONE 5 MG: 5 TABLET ORAL at 08:12

## 2024-02-21 RX ADMIN — METOPROLOL TARTRATE 25 MG: 100 TABLET, FILM COATED ORAL at 20:46

## 2024-02-21 RX ADMIN — ACETAMINOPHEN 650 MG: 325 TABLET, FILM COATED ORAL at 18:37

## 2024-02-21 RX ADMIN — POTASSIUM & SODIUM PHOSPHATES POWDER PACK 280-160-250 MG 1 PACKET: 280-160-250 PACK at 08:12

## 2024-02-21 RX ADMIN — HEPARIN SODIUM 5000 UNITS: 5000 INJECTION, SOLUTION INTRAVENOUS; SUBCUTANEOUS at 12:39

## 2024-02-21 RX ADMIN — CALCIUM CARBONATE 600 MG (1,500 MG)-VITAMIN D3 400 UNIT TABLET 1 TABLET: at 12:14

## 2024-02-21 RX ADMIN — CYANOCOBALAMIN TAB 500 MCG 500 MCG: 500 TAB at 08:12

## 2024-02-21 RX ADMIN — ACETAMINOPHEN 650 MG: 325 TABLET, FILM COATED ORAL at 12:14

## 2024-02-21 RX ADMIN — LIDOCAINE 4% 1 PATCH: 40 PATCH TOPICAL at 20:46

## 2024-02-21 RX ADMIN — POTASSIUM & SODIUM PHOSPHATES POWDER PACK 280-160-250 MG 1 PACKET: 280-160-250 PACK at 20:46

## 2024-02-21 RX ADMIN — PREDNISONE 2.5 MG: 2.5 TABLET ORAL at 20:46

## 2024-02-21 RX ADMIN — PIPERACILLIN AND TAZOBACTAM 2.25 G: 2; .25 INJECTION, POWDER, FOR SOLUTION INTRAVENOUS at 12:57

## 2024-02-21 RX ADMIN — OLIVE OIL AND SOYBEAN OIL 250 ML: 16; 4 INJECTION, EMULSION INTRAVENOUS at 20:36

## 2024-02-21 RX ADMIN — TACROLIMUS 4.5 MG: 5 CAPSULE ORAL at 18:37

## 2024-02-21 RX ADMIN — HEPARIN SODIUM 5000 UNITS: 5000 INJECTION, SOLUTION INTRAVENOUS; SUBCUTANEOUS at 00:21

## 2024-02-21 RX ADMIN — TACROLIMUS 4 MG: 5 CAPSULE ORAL at 08:11

## 2024-02-21 RX ADMIN — Medication 40 MG: at 08:11

## 2024-02-21 RX ADMIN — PIPERACILLIN AND TAZOBACTAM 2.25 G: 2; .25 INJECTION, POWDER, FOR SOLUTION INTRAVENOUS at 18:37

## 2024-02-21 RX ADMIN — CALCIUM CARBONATE 600 MG (1,500 MG)-VITAMIN D3 400 UNIT TABLET 1 TABLET: at 18:37

## 2024-02-21 RX ADMIN — ACETAMINOPHEN 650 MG: 325 TABLET, FILM COATED ORAL at 05:05

## 2024-02-21 RX ADMIN — ACETAMINOPHEN 650 MG: 325 TABLET, FILM COATED ORAL at 00:21

## 2024-02-21 RX ADMIN — MAGNESIUM SULFATE HEPTAHYDRATE: 500 INJECTION, SOLUTION INTRAMUSCULAR; INTRAVENOUS at 20:36

## 2024-02-21 RX ADMIN — DAPSONE 50 MG: 100 TABLET ORAL at 08:11

## 2024-02-21 RX ADMIN — Medication 40 MG: at 20:46

## 2024-02-21 RX ADMIN — CALCIUM CARBONATE 600 MG (1,500 MG)-VITAMIN D3 400 UNIT TABLET 1 TABLET: at 08:12

## 2024-02-21 RX ADMIN — MICAFUNGIN SODIUM 100 MG: 50 INJECTION, POWDER, LYOPHILIZED, FOR SOLUTION INTRAVENOUS at 11:45

## 2024-02-21 RX ADMIN — ONDANSETRON 4 MG: 4 TABLET, ORALLY DISINTEGRATING ORAL at 18:56

## 2024-02-21 RX ADMIN — LOPERAMIDE HYDROCHLORIDE 2 MG: 2 CAPSULE ORAL at 15:11

## 2024-02-21 ASSESSMENT — ACTIVITIES OF DAILY LIVING (ADL)
ADLS_ACUITY_SCORE: 34
ADLS_ACUITY_SCORE: 36
ADLS_ACUITY_SCORE: 34
ADLS_ACUITY_SCORE: 36
ADLS_ACUITY_SCORE: 34
ADLS_ACUITY_SCORE: 36
ADLS_ACUITY_SCORE: 33
ADLS_ACUITY_SCORE: 34
ADLS_ACUITY_SCORE: 35

## 2024-02-21 NOTE — PROGRESS NOTES
Lung Transplant Consult Follow Up Note   February 21, 2024            Assessment and Plan:   Sofie Rodriguez is a 63 year old female with h/o bilateral lung transplant for COPD on 6/28/22 with course complicated by post-operative hemidiaphragm palsy, recurrent PNAs, positive DSA, EBV viremia, hypogammaglobulinemia, severe gastroparesis s/p G/J tube placement 7/27/22 with pyloric botox 1/25/23, GI bleed 2/2 pyloric ulcer, hemobilia s/p ERCP and MRCP, chronic diarrhea, recurrent C diff colitis, and ESRD on HD.      Admitted May 2023 for FTT, supposed to be readmitted in July for FTT, but refused. Admitted to OSH 12/4-12/31 in December for right hip fracture s/p ORIF, now with significant deconditioning and ongoing severe malnutrition with gastroparesis, small bowel hypermotility and failure to tolerate any tube feeds.      After extensive discussion with the dietician, GI and ultimately convincing the patient, the patient was admitted on 2/10 for initiation of TPN/lipids. She is s/p port placement with persistent pain at port site. GI recommends trickle feeds for maintaining bowel and CT enterography to look for structural abnormalities (unable due to large bolus enteral contrast required for the study). She was transferred to the ICU on 2/17 with worsening mental status, worsening respiratory acidosis despite Bpap use, ultimately requiring intubation and mechanical ventilation. CT chest concerning for new infection vs volume overload and started on empiric antimicrobial therapy (micafungin, zosyn, vanco) however extubated 2/19 and infectious work up is negative to date.     Recommendations:   -  Complete 7 day course of zosyn  -  Consider stopping \micafungin.  -electrolyte replacement as per primary team to manage patients probable refeeding syndrome  - Please reconsult GI for ongoing severe diarrhea.  - Strict I/Os and daily weights, appreciate nephrology assistance with volume management  - O2 to keep SaO2 > 92%  -  Prospera 2/18 (pending)  - Tacro level therapeutic at 7.2 (2/21). Contiunue current dose. Recheck 2/23 (ordered)  - Recheck CRP (ordered)  - Follow daily VBG (ordered)     S/p bilateral lung transplant:   Right hemidiaphragm palsy:   Suspected CARLEE:  New consolidated nodular opacities and GGO concerning for infection:  Severely deconditioned. CMV 2/7 negative. ImmuKnow 108 and cfDNA 0.12 on 1/10. CT 2/7 with decreased MARLON opacities, new tree in bud RLL opacities without new symptoms. PFTs unchanged in clinic (but ATS not met), remain significantly below her baseline (1.4-1.5L). Noted increased dyspnea since the Port-A-Cath placement.  Review of notes indicate patient should be on 2 L o2 at night from prior overnight O2 study in Jan 2023. Also reported a slight increase in cough. She was requiring O2 for comfort only but decompensated on 2/17 with worsening encephalopathy, respiratory acidosis (pCO2 up to 106). CT chest 2/17 showed very patchy and new consolidative, nodular opacities, GGO primarily in the bases and intralobular septal thickening concerning for pulmonary edema and volume overload along with new, possibly fungal vs. Atypical infection vs. PTLD (less likely but in the differential) vs. Septic emboli.  Based on negative infectious work up at this time, would favor volume overload in the setting of initiation of TPN  - 2/21 VBG 7.31/56  Persistent CO2 retention since extubation although may be chronic based on fall 2022 VBGs. Continue monitoring for progression/improvement (VBG ordered).  - bronch with lavage of RML 2/18 showed very friable tissue; no significant lab results  - 2/18 Galactomannan (-)  - 2/18 Fungitell (-)  - No evidence of fungus.  Consider stopping micafungin.  - 2/20 CRP 15.2 (repeat ordered for trend)  - DSA 2/7 with persistent DQ B2, MFI increased to 6966, see below  - Check Prospera to evaluate significance of positive DSA (pending )  - repeat overnight oximetry study 24-48h before  discharge.  - schedule follow up with sleep to discuss possible CPAP given recommendations from August sleep study  - appreciate strict I&O and volume management as per primary and nephrology     Immunosuppression:  - Tacrolimus 4 mg qAM and tacrolimus 4.5 mg qPM. Goal level 7-9. 2/21 level 7.2 (10.5h) Continue current dosing, repeat level 2/23 (ordered).   - Await Prospera (pending from 2/18) for consideration of IS regimen alterations  - Continue Prednisone 5 mg/2.5 mg     Prophylaxis:   - Dapsone 50 mg q MWf for PJP ppx     Positive DSA: no prior treatment for AMR, has been watched for quite some time given no pulmonary symptoms, prior PFTs stability and significant and ongoing failure to thrive. Last DSA improved to 5723, however will ongoing lower PFTs, may need to consider AMR treatment, however, unclear how she would tolerate.   -  DSA 2/7 with persistent DQ B2, MFI increased to 6966, see below  - Check Prospera to evaluate significance of positive DSA (pending 2/18)     ESRD: Dialysis dependent.  Suspect her respiratory symptoms were volume overload secondary to initiation of TPN.  CT chest suggesting volume overload. Has undergone multiple days in a row of dialysis to try to pull fluid  -BNP >70,000.  -Dialysis as per nephrology.  -May require daily dialysis until euvolemic again  -Monitor strict ins and outs      EBV viremia: last level of 96K (2/7), CT c/a/p (2/7) without adenopathy.  - EBV ordered for 2/18 - 91673.    - CMV ordered 2/19 (-)     Acute metabolic encephalopathy - resolved     Severe protein calorie malnutrition:  FTT:   Gastroparesis s/p PEG/J s/p botox and G-POEM:   SB Hypomotility  Pyloric ulcer:  Chronic nausea and osmotic diarrhea:  SIBO s/p rifaximin:   Recurrent C diff colitis: chronic diarrhea since transplant with recurrent episodes of C diff. GI previously consulted, felt stools consistent with osmotic diarrhea. Over the last year has lost 40 lbs, unable to tolerate any combination  of TF, most recently elemental formula. Normal fecal elastase last May. Following with Dr. Navarro who feels her main two issues are vagal injury induced gastroparesis and probably small bowel hypomotility. Her intolerance to J-tube feeds suggests the latter to be a significant issue (notes in a message that there are no motility experts at the  and he is limited in what can be offered). Now s/p port placement with TPN and lipids. GI recommending trickle feeds and CT to assess for structural issues.   -close monitoring as patient is high risk for refeeding syndrome  -Recs per primary and GI  -Concern that patient will not tolerate CT enterography according to chart notes as she will require 1500 mL enteral contrast bolus which she is unlikely to tolerate given her gastroparesis and reduced bowel function.     We appreciate the excellent care provided by the ICU team.    Will continue to follow along closely, please do not hesitate to call with any questions or concerns.       Ajay Castillo MD  [phone]            Interval History:   C/O frontal headache for the last few days. Pain at portacath (improving). Pain at dialysis fistula. Dull ache at hip (previous fracture, chronic).  Dyspnea at rest: None  Dyspnea on exertion: Minimal activity so difficult to assess  Cough: occasional  Sputum: small amt swallowed  Hemoptysis: none   Chest Pain: None (except port)           Review of Systems:   C: NEGATIVE for fever, chills  INTEGUMENTARY/SKIN: no rash or obvious new lesions  ENT/MOUTH: no sore throat, new sinus pain or nasal drainage  RESP: see interval history  CV: NEGATIVE for chest pain, palpitations. peripheral edema improving  GI: no nausea, vomiting. Persistent loose stool  : no dysuria, minimal urine  MUSCULOSKELETAL: See interval history.           Medications:      acetaminophen  650 mg Oral or Feeding Tube Q6H    calcium carbonate-vitamin D  1 tablet Per J Tube TID w/meals    cyanocobalamin  500 mcg Per  Feeding Tube Daily    dapsone  50 mg Per J Tube Q Mon Wed Fri AM    heparin ANTICOAGULANT  5,000 Units Subcutaneous Q12H    insulin aspart  1-4 Units Subcutaneous Q4H    lidocaine  1 patch Transdermal Q24h    lipids plant base  250 mL Intravenous Once per day on Monday Wednesday Friday    [Held by provider] metoprolol  25 mg Per J Tube BID    micafungin  100 mg Intravenous Q24H    pantoprazole  40 mg Per J Tube BID    piperacillin-tazobactam  2.25 g Intravenous Q6H    potassium & sodium phosphates  1 packet Oral or Feeding Tube BID    predniSONE  5 mg Per J Tube QAM    And    predniSONE  2.5 mg Per J Tube QPM    [Held by provider] sevelamer carbonate (RENVELA)  0.8 g Oral BID    tacrolimus  4 mg Per J Tube QAM    And    tacrolimus  4.5 mg Per J Tube QPM     albumin human, albuterol, calcium carbonate, dextrose, dextrose, glucose **OR** dextrose **OR** glucagon, lidocaine (buffered or not buffered), lidocaine-prilocaine, loperamide, naloxone **OR** naloxone **OR** naloxone **OR** naloxone, ondansetron **OR** ondansetron, senna-docusate **OR** senna-docusate, sodium chloride, sodium chloride (PF), sodium chloride 0.9%         Physical Exam:   Temp:  [97.8  F (36.6  C)-99.4  F (37.4  C)] 97.8  F (36.6  C)  Pulse:  [] 95  Resp:  [11-27] 20  BP: (127-155)/(68-92) 152/88  SpO2:  [92 %-99 %] 98 %    Intake/Output Summary (Last 24 hours) at 2/21/2024 0901  Last data filed at 2/21/2024 0800  Gross per 24 hour   Intake 2462 ml   Output 3000 ml   Net -538 ml     Constitutional:   Awake, alert and in no apparent distress     Eyes:   Nonicteric, left pupil slightly larger than right     ENT:    oral mucosa moist without lesions     Neck:   Supple without supraclavicular or cervical lymphadenopathy     Lungs:   Good air flow.  Bibasilar, L>R  crackles. No rhonchi.  No wheezes.     Cardiovascular:   Normal S1 and S2.  RRR.  II/VI sys murmur. No gallop. No rub.     Abdomen:   NABS, soft, nondistended, nontender.  No HSM.      Musculoskeletal:   1+  edema (improved from the weekend)     Neurologic:   Alert and conversant.     Skin:   Warm, dry.  No rash on limited exam.             Data:   All laboratory and imaging data reviewed.    Results for orders placed or performed during the hospital encounter of 02/10/24 (from the past 24 hour(s))   Lactic acid whole blood   Result Value Ref Range    Lactic Acid 0.8 0.7 - 2.0 mmol/L   Glucose by meter   Result Value Ref Range    GLUCOSE BY METER POCT 177 (H) 70 - 99 mg/dL   Potassium   Result Value Ref Range    Potassium 2.9 (L) 3.4 - 5.3 mmol/L   Basic metabolic panel   Result Value Ref Range    Sodium 132 (L) 135 - 145 mmol/L    Potassium 2.9 (L) 3.4 - 5.3 mmol/L    Chloride 96 (L) 98 - 107 mmol/L    Carbon Dioxide (CO2) 24 22 - 29 mmol/L    Anion Gap 12 7 - 15 mmol/L    Urea Nitrogen 11.4 8.0 - 23.0 mg/dL    Creatinine 1.10 (H) 0.51 - 0.95 mg/dL    GFR Estimate 57 (L) >60 mL/min/1.73m2    Calcium 7.9 (L) 8.8 - 10.2 mg/dL    Glucose 204 (H) 70 - 99 mg/dL   Magnesium   Result Value Ref Range    Magnesium 2.1 1.7 - 2.3 mg/dL   Phosphorus   Result Value Ref Range    Phosphorus 1.5 (L) 2.5 - 4.5 mg/dL   Glucose by meter   Result Value Ref Range    GLUCOSE BY METER POCT 178 (H) 70 - 99 mg/dL   Basic metabolic panel   Result Value Ref Range    Sodium 134 (L) 135 - 145 mmol/L    Potassium 3.2 (L) 3.4 - 5.3 mmol/L    Chloride 97 (L) 98 - 107 mmol/L    Carbon Dioxide (CO2) 27 22 - 29 mmol/L    Anion Gap 10 7 - 15 mmol/L    Urea Nitrogen 15.5 8.0 - 23.0 mg/dL    Creatinine 1.53 (H) 0.51 - 0.95 mg/dL    GFR Estimate 38 (L) >60 mL/min/1.73m2    Calcium 7.6 (L) 8.8 - 10.2 mg/dL    Glucose 135 (H) 70 - 99 mg/dL   Phosphorus   Result Value Ref Range    Phosphorus 2.0 (L) 2.5 - 4.5 mg/dL   Glucose by meter   Result Value Ref Range    GLUCOSE BY METER POCT 116 (H) 70 - 99 mg/dL   Magnesium   Result Value Ref Range    Magnesium 1.8 1.7 - 2.3 mg/dL   Potassium   Result Value Ref Range    Potassium 3.4 3.4 -  5.3 mmol/L   Phosphorus   Result Value Ref Range    Phosphorus 2.3 (L) 2.5 - 4.5 mg/dL   Basic metabolic panel   Result Value Ref Range    Sodium 134 (L) 135 - 145 mmol/L    Potassium 3.4 3.4 - 5.3 mmol/L    Chloride 98 98 - 107 mmol/L    Carbon Dioxide (CO2) 27 22 - 29 mmol/L    Anion Gap 9 7 - 15 mmol/L    Urea Nitrogen 20.2 8.0 - 23.0 mg/dL    Creatinine 1.87 (H) 0.51 - 0.95 mg/dL    GFR Estimate 30 (L) >60 mL/min/1.73m2    Calcium 7.9 (L) 8.8 - 10.2 mg/dL    Glucose 108 (H) 70 - 99 mg/dL   Glucose by meter   Result Value Ref Range    GLUCOSE BY METER POCT 137 (H) 70 - 99 mg/dL   Glucose by meter   Result Value Ref Range    GLUCOSE BY METER POCT 125 (H) 70 - 99 mg/dL   CBC with Platelets & Differential    Narrative    The following orders were created for panel order CBC with Platelets & Differential.  Procedure                               Abnormality         Status                     ---------                               -----------         ------                     CBC with platelets and d...[[id number]]  Abnormal            Final result               Manual Differential[[id number]]          Abnormal            Final result                 Please view results for these tests on the individual orders.   Basic metabolic panel   Result Value Ref Range    Sodium 133 (L) 135 - 145 mmol/L    Potassium 3.5 3.4 - 5.3 mmol/L    Chloride 97 (L) 98 - 107 mmol/L    Carbon Dioxide (CO2) 25 22 - 29 mmol/L    Anion Gap 11 7 - 15 mmol/L    Urea Nitrogen 24.1 (H) 8.0 - 23.0 mg/dL    Creatinine 2.06 (H) 0.51 - 0.95 mg/dL    GFR Estimate 27 (L) >60 mL/min/1.73m2    Calcium 8.4 (L) 8.8 - 10.2 mg/dL    Glucose 122 (H) 70 - 99 mg/dL   Magnesium   Result Value Ref Range    Magnesium 1.9 1.7 - 2.3 mg/dL   Phosphorus   Result Value Ref Range    Phosphorus 2.8 2.5 - 4.5 mg/dL   Comprehensive metabolic panel   Result Value Ref Range    Sodium 133 (L) 135 - 145 mmol/L    Potassium 3.5 3.4 - 5.3 mmol/L    Carbon Dioxide (CO2) 25 22 -  29 mmol/L    Anion Gap 11 7 - 15 mmol/L    Urea Nitrogen 24.1 (H) 8.0 - 23.0 mg/dL    Creatinine 2.06 (H) 0.51 - 0.95 mg/dL    GFR Estimate 27 (L) >60 mL/min/1.73m2    Calcium 8.4 (L) 8.8 - 10.2 mg/dL    Chloride 97 (L) 98 - 107 mmol/L    Glucose 122 (H) 70 - 99 mg/dL    Alkaline Phosphatase 64 40 - 150 U/L    AST 15 0 - 45 U/L    ALT <5 0 - 50 U/L    Protein Total 4.9 (L) 6.4 - 8.3 g/dL    Albumin 2.9 (L) 3.5 - 5.2 g/dL    Bilirubin Total 0.2 <=1.2 mg/dL   Magnesium   Result Value Ref Range    Magnesium 2.0 1.7 - 2.3 mg/dL   Potassium   Result Value Ref Range    Potassium 3.4 3.4 - 5.3 mmol/L   Phosphorus   Result Value Ref Range    Phosphorus 2.8 2.5 - 4.5 mg/dL   Blood gas venous   Result Value Ref Range    pH Venous 7.31 (L) 7.32 - 7.43    pCO2 Venous 56 (H) 40 - 50 mm Hg    pO2 Venous 62 (H) 25 - 47 mm Hg    Bicarbonate Venous 28 21 - 28 mmol/L    Base Excess/Deficit Venous 1.3 -3.0 - 3.0 mmol/L    FIO2 21     Oxyhemoglobin Venous 90 (H) 70 - 75 %    O2 Sat, Venous 91.1 (H) 70.0 - 75.0 %    Narrative    In healthy individuals, oxyhemoglobin (O2Hb) and oxygen saturation (SO2) are approximately equal. In the presence of dyshemoglobins, oxyhemoglobin can be considerably lower than oxygen saturation.   CBC with platelets and differential   Result Value Ref Range    WBC Count 3.4 (L) 4.0 - 11.0 10e3/uL    RBC Count 2.16 (L) 3.80 - 5.20 10e6/uL    Hemoglobin 7.8 (L) 11.7 - 15.7 g/dL    Hematocrit 24.6 (L) 35.0 - 47.0 %     (H) 78 - 100 fL    MCH 36.1 (H) 26.5 - 33.0 pg    MCHC 31.7 31.5 - 36.5 g/dL    RDW 20.0 (H) 10.0 - 15.0 %    Platelet Count 149 (L) 150 - 450 10e3/uL    % Neutrophils      % Lymphocytes      % Monocytes      % Eosinophils      % Basophils      % Immature Granulocytes      NRBCs per 100 WBC 0 <1 /100    Absolute Neutrophils      Absolute Lymphocytes      Absolute Monocytes      Absolute Eosinophils      Absolute Basophils      Absolute Immature Granulocytes      Absolute NRBCs 0.0 10e3/uL    Manual Differential   Result Value Ref Range    % Neutrophils 43 %    % Lymphocytes 25 %    % Monocytes 26 %    % Eosinophils 3 %    % Basophils 2 %    % Promyelocytes 1 %    NRBCs per 100 WBC 1 (H) <=0 %    Absolute Neutrophils 1.5 (L) 1.6 - 8.3 10e3/uL    Absolute Lymphocytes 0.9 0.8 - 5.3 10e3/uL    Absolute Monocytes 0.9 0.0 - 1.3 10e3/uL    Absolute Eosinophils 0.1 0.0 - 0.7 10e3/uL    Absolute Basophils 0.1 0.0 - 0.2 10e3/uL    Absolute Promyelocytes 0.0 <=0.0 10e3/uL    Absolute NRBCs 0.0 <=0.0 10e3/uL    RBC Morphology Confirmed RBC Indices     Platelet Assessment  Automated Count Confirmed. Platelet morphology is normal.     Automated Count Confirmed. Platelet morphology is normal.     *Note: Due to a large number of results and/or encounters for the requested time period, some results have not been displayed. A complete set of results can be found in Results Review.

## 2024-02-21 NOTE — PROGRESS NOTES
Care Management Follow Up    Length of Stay (days): 10    Expected Discharge Date:       Concerns to be Addressed: discharge planning, other (see comments) (New TPN)     Patient plan of care discussed at interdisciplinary rounds: Yes    Anticipated Discharge Disposition: Home Infusion     Anticipated Discharge Services: None  Anticipated Discharge DME: None    Patient/family educated on Medicare website which has current facility and service quality ratings:    Education Provided on the Discharge Plan: Yes  Patient/Family in Agreement with the Plan: yes    Referrals Placed by CM/SW: External Care Coordination  Private pay costs discussed: Not applicable    Additional Information:    Pt transferred to  from ICU on 2/20.  Pt with complex medical history and complicated hospital course.  Per initial chart review recent provider notes, CM team notes final plan for discharge pending medical stability, therapy recommendations, nutrition plan.    Pt currently  requiring TPN, TF, IV antibiotics (final plan pending cultures) Transplant ID consulted, HD T/TH/SAT  Per RNCC  notes prior to hospitalization pt was open to [facility] for enteral feeds.       Prior to hospitalization services:  [facility]  Phone # [phone]  Fax # [phone]   Home Enteral    Dialysis:  [facility] [place]:  Chair time of 11:45 AM T/TH/Sat  Phone number: [phone]  Fax: [phone]     [facility] - [phone] - for dialysis     Home [address] [employer] home oxygen:   Fax: [phone]   Phone: [phone]    PT/OT currently recommending TCU however pending LOS may progress to a home with home care plan.       Will defer to transplant SW for discharging planning.  RNCC will continue to monitor.     Sara Benítez RN BSN, PHN, ACM-RN  2/12/2024  Nurse Coordinator    Phone: [phone]    Social Work and Care Management Department     SEARCHABLE in [facility] - search CARE COORDINATOR     [facility] [place] ([id number])  Saturday & Sunday; ([id number]) [facility] Recognized Holidays     Units: 5A Onc Vocera & 5C Vocera Pager: [phone]    Units: 6B Vocera & 6C Vocera  Pager: [phone]    Units: 7A SOT RNCC Vocera, 7B Med Surg Vocera, & 7C Med Surg Vocera  Pager: [phone]    Units: 6A Vocera & 4A CVICU Vocera, 4C MICU Vocera, and 4E SICU Vocera   Pager: [phone]    Units: 5 Ortho Vocera & 5 Med Surg Vocera  Pager: [phone]    Units: 6 Med Surg Vocera & 8 Med Surg Vocera  Pager [phone]    2/21/2024

## 2024-02-21 NOTE — PLAN OF CARE
"VS: /81 (BP Location: Right arm)   Pulse 91   Temp 98.2  F (36.8  C) (Oral)   Resp 16   Ht 1.57 m (5' 1.81\")   Wt 47.4 kg (104 lb 9.6 oz)   SpO2 98%   BMI 19.25 kg/m      Cares: 9775 - 7533 (pt came up from 4C)    Neuro: Aox4   VS: VSS   Cardiac: WDL   Respiratory: WDL on RA, denies SOB.   GI/: anuric (pt on HD - T/TH/S), BM this AM (loose)   Skin: bruising on left & right forearm/upper arm. Bruising on right side of neck & face. Small skin tear on right shin and left bicep.   Diet: Renal & TF @ 10mL/hr w/ FWF 30mL Q4H (calorie count)   Labs: awaiting AM lab results // needs urine sample sent (pt is anuric)   BG: Q4H (125)   LDA: PEG-J tube (J - TF, G - clamped), Right CVC - TPN @ 43mL/hr, right PIV - TKO w/ ABX, Left AVF (bruising/edema)  Mobility: Ax1 w/ pivot to commode  Pain/Nausea: right hip/right shoulder pain, denies nausa   PRN medications: none given / pain managed with scheduled tylenol, lidocaine patch on right hip, and hot packs  Plan of Care: possible US of left AVF today. Continue with current POC and update MD with any changes.   "

## 2024-02-21 NOTE — PROGRESS NOTES
Admitted/transferred from: 4C  Time of arrival on unit @ 8705   2 RN full  skin assessment completed by Julia MULTANI And Susu PENDLETON  Skin assessment finding: dime size right shin skin tear, quarter size left bicep skin tear, bruising on both left and right forearm and upper arm. Right side of neck and face has bruising. Blanchable redness on coccyx. LLQ has a PEG-J tube. Right subclavian CVC.   Interventions/actions: band aid and primapore covering x2 skin tears and mepilex on coccyx     Will continue to monitor.

## 2024-02-21 NOTE — PROGRESS NOTES
Calorie Count  Intake recorded for: 2/20  Total Kcals: 1158 Total Protein: 40g  Kcals from Hospital Food: 1158   Protein: 40g  Kcals from Outside Food (average):0 Protein: 0g  # Meals Ordered from Kitchen: 3  # Meals Recorded: 2  (1: 100% hamburger on white bun w/torres, tater tots)  (2: 100% hamburger on white bun w/torres, tater tots, 3 pkgs saltines)  # Supplements Recorded: 0

## 2024-02-21 NOTE — PROGRESS NOTES
CLINICAL NUTRITION SERVICES - BRIEF NOTE     Nutrition Prescription    RECOMMENDATIONS FOR MDs/PROVIDERS TO ORDER:  Agree with GI consult to explore possible ways to improve PO and/or TF tolerance.     Consider advancing TF as tolerated towards goal rate of 45 ml/hr.  This would provide 1661 kcal (39 kcal/kg), 80 g protein (1.9 g/kg), 149 g CHO, 16 g fiber, 83 g fat (40% from MCTs), and 756 mL free water daily     Liberalize diet to Regular (lytes normal or low)    Recommendations already ordered by Registered Dietitian (RD):  Reduce TPN dextrose to 195 grams, AA to 52 grams.  Updated TPN regimen will provide 1085 kcal (25 kcal/kg), 1.2g/kg protein, 20% kcal from fat  [TPN + TF at 10 ml/hr providing 1445 kcal (34 kcal/kg), 70g protein (1.6g/kg)]    Continue calorie counts (2/21-2/23)       EVALUATION OF THE PROGRESS TOWARD GOALS   Diet: Renal   Nutrition Support:   TPN with 1032 ml (43 ml/hr), Dex 250g, AA 61g + 250 ml IV Clinolipid 3x/week  Eneida Stoll Impact Peptide 1.5 @ 10 ml/hr    Intake: Calorie counts: 2/20 - 1158 kcal and 40 grams protein     Per patient, how she has been eating yesterday and today can be similar to how she eats at home yet it does tend to fluctuate.  She typically tolerates running her TF at 30 ml/hr (max rate), but notes sometimes she turns it off completely d/t how she feels.  She was losing weight with this regimen.     INTERVENTIONS  Reviewed nutrition hx with patient.  Discussed oral supplement options, pt declined these.  Discussed current nutrition regimens.     Collaborated with PharmD and team re: above recommendations.     Monitoring/Evaluation  Progress toward goals will be monitored and evaluated per protocol.     Pauline Anderson, MS, RD, LD, CCTD, CNSC  7A and 7B beds [phone], pager [phone]  Weekend pager [phone]

## 2024-02-21 NOTE — PROGRESS NOTES
Medicine Progress note      [facility]  Medicine Service, MAROON TEAM 1    Date of Hospital Admission: 2/10/2024  Date of ICU Admission: 2/18/2024  Date of Transfer to Medicine Floor: 2/20/2024  Date of Service (when I saw the patient): 02/21/2024      Assessment & Plan  Sofie Rodriguez is a 63 year old female with PMH COPD s/p bilateral lung transplant 6/28/22 c/b hemidiaphragm palsy and recurrent pneumonias, gastroparesis and small bowel dysmotility complicated by severe malnutrition now s/p PEG/J, ESRD on T/Th/Sat HD, recent R femoral fx s/p ORIF, chronic diarrhea, recurrent c-diff, FTT with inability to tolerate any tube feeds, who was admitted to [facility] on 2/10/24 for concerns over malnutrition and TPN initiation via portacath. On 2/17/24 she was transferred to ICU for worsening mental status and acute hypoxic and hypercarbic respiratory failure inspite of BiPAP requiring intubation. She was suspected to have PNA and started on antibiotics. Extubated on 2/19 without complications, now on RA, tolerated iHD on 2/20, and tolerating PO regular diet without n/v from 2/20. She is stable currently. Discussed with GI - will obtain CT enterography, consider treating for SIBO if ongoing concern for poor PO intake, would need small bowel motility study if not improving outpatient through [place]. USG fistulogram on 2/21 shows steel physiology and Vascular Surgery consulted for their opinion.      Changes today:   - CT enterography with contrast  - broaden diet to regular per nutrition given low lytes  - BID feeding labs, trend and replace lytes as needed  -  Continue calorie counts,daily weights, I/Os  - Continue Zosyn (2/17 - 2/23 )   - stop micafungin today 2/21 per transplant pulm rec  - port culture per transplant ID rec  - Continue zofran PRN if n/v  - Stop micafungin (as per pulm rec)  - discussed with GI - will obtain CT enterography, consider treating for  SIBO if ongoing concern for poor PO intake, would need small bowel motility study if not improving outpatient through [place]  -  LUE arterial to evaluate for stenosis or other vascular issue with fistula contributing to left arm edema (edema appears much improved on exam today in LUE)  - vascular surgery consulted with c/f steal physiology on LUE US  - ID transplant consulted, recs appreciated      # Respiratory acidosis  # Acute hypoxic and hypercarbic respiratory failure  # S/P Lung transplant 2022  # Recurrent pneumonia     Patient received a bilateral lung transplant 6/28/22 for COPD. Was admitted 2/10 to address malnutrition   and admitted to ICU on 2/17 with hypoxic hypercarbic respiratory failure. Intubated on 2/18 AM  due to worsening oxygen requirements, likely due to pulmonary edema given hx of ESRD requiring HD vs infection given hx of lung transplant, immunosuppressed status, and recurrent pneumonias. Extubated on 2/19 without complications,   Micro Follow-up on BAL, viral panel, CMV, fungus, and Blood Cultures show no abnormalities. Currently on room air and has no new SOB, cough, chest pain, fever, chills. Didn't use any Bipap overnight as she tolerated RA.       - PRN hypertonic saline inhaler with albuterol nebs  - Continue good pulm toilet  - Transplant pulmonology following, recs appreciated  - PTA immunosuppressive agent  >Prednisone 5 mg every morning, 2.5 mg every afternoon; tacrolimus 4 mg every morning, 4mg every afternoon  > Monitor tacro levels, goal 7-9  - PTA dapsone MWF for PJP prophylaxis  - Continue zosyn for 7 days for empiric HAP course (2/17 - 2-23)  - Initial decompensation likely from opioids - limit medications that would depress respiration   - Transplant ID consulted per transplant pulm recs   - Stop micafungin ( as per pulm rec)    Antibiotics:    Vancomycin (2/17 - 2/17)  Zosyn (2/17 - )  Dapsone MWF, PJP ppx     Immunosuppression  Tacrolimus  Prednisone       # Severe  malnutrition   # FTT   # Gastroparesis, small bowel dysmotility  # S/P PEG/J with intolerance of enteral nutrition     Patient with gastroparesis (presumed due to vagal injury) and small bowel dysmotility complicated by unintentional 40lb weight loss over the past year and now severe malnutrition. Previously intolerant to oral food intake due to nausea. Was initially admitted for portacath and TPN initiation since 2/13. After extubation has been able to tolerate feeds without n/v from 2/20. Electrolytes trending towards baseline today.  Discussed again with GI - will follow [place]s [place] [facility] [place], obtain CT enterography, consider treating for SIBO if ongoing concern for poor PO intake, would need small bowel motility study if not improving outpatient through [place].   - Continue calorie count.  - Continue TPN, trickle feeds per RD               - Nephrology: limit fluid < 1.5 L per day for TPN ( chart vol of TPN in the I/O)   - will continue calorie counts, if able to tolerate PO, consider increased tube feeds to supplement  - Continue BID refeeding labs  - per discussion with GI - will follow prior recs:   - CT enterography to eval for anatomical causes of bowel dysmotility   - diet, supplemental nutrition per RD   - consider treatment for SIBO if difficulty with symptom management   - if no results on CT enterography that explain bowel dysmotility, will need to follow up at [place] for small bowel dysmotility      # Steal physiology of LUE dialysis access fistula  LUE arterial US obtained 2/21 with concern for LUE edema. US of fistula demonstrated steal physiology. Discussed with nephrology, will consult vascular surgery re: need for possible revision of fistula vs other intervention needed.   - Vascular surgery consult placed      # Hypoalbuminemia  # Left upper extremity unilateral edema  # Bilateral pedal and ankle edema  Edema due to third spacing due to hypoalbuminemia vs HF. BNP >35376 at admission, Echo on  2/18 shows EF of 55-60% with IVC of <2.1 and collapsing >50% with sniff, normal RA pressure, no significant valvular abnormalities ;  Left upper extremity swelling and  pitting lower extremity edema likely due to hypoalbuminemia improved today. Upper limb duplex USG on 2/18 negative for DVT. Wt went from 43.4 kg on admission to 47.4 kg today. She is urinating but cannot chart as she usually urinates with BM. He reports trending to baseline with urination. She denies dysuria, retention symptoms.USG fistulogram on left arm on 2/21 shows steel physiology and Vascular Surgery consulted for their opinion.  - Continue daily weights  - Strict I/Os  - Discussed with nephrology - will eval for issues with fistula given no DVT on prior US on 2/18  - Elevation and wrapping of only lower legs; no wrapping of Left upper extremity with dialysis fistula        # Anion gap metabolic acidosis   # ESRD on HD T/Th/Sat  # Acute on chronic Anemia 2/2 ESRD   # Hypervolemic hyponatremia  Patient is ESRD on T/Th/Sat HD, Hgb stabilizing after the transfusion. HD tolerated well yesterday. Electrolytes trending to baseline today. Anion Gap is normal this AM. Will continue to monitor labs for refeeding syndrome.  - Continue Venofer injections    - CBP and CMP in the AM for Hb and elcetrolytes  - Continue epogen dose 8000 units as per nephrology  - Strict I/Os  - HD T/TH/Sat per nephrology      # Facial and neck bruising  # Pain  Reports soreness in her neck from lying in bed. No soreness in the buttocks/ back. Patient has multiple bruises over her arms and face which is attributed to previous falls. No new bruising reported today. Patient complaints of occasional 5/10 headaches over the forehead since extubation. The headaches improve with tylenol. Rest and sleep makes it better. Using heating pads and lidocaine patch for body pains. Couple of small skin tears noted by the Rn's. Skin care done accordingly.  - Tylenol Q4  - Lidocaine patch  prn  - Heating pads prn  -Diligent skin cares      # HTN  # A-fib, resolved  Noted atrial fibrillation on arrival with HR elevated at 150, not sustained for > 10 minutes and otherwise hemodynamically stable. Rates stable in the 70's.  - PTA metoprolol 25mg BID held given recent soft Bps in ICU, will reassess for resumption  - Continuous telemetry      # Encephalopathy secondary to hypercarbia - resolved    Patient was progressively more lethargic on 2/17 during admission in [facility] [place] [place]. Etiology likely secondary to hypercarbia in context of respiratory failure as patient was noted to have high CO2s concomitant with lethargy. Though receiving oxycodone and fentanyl, lethargy was only partially alleviated by narcan. LFTs and ammonia wnl with low suspicion of hepatic encephalopathy. BUN wnl with low suspicion for uremic encephalopathy. Head CT on 2/18 was negative with low suspicion of acute intracranial pathology. Patient is awake, alert, oriented and following commands as of 2/20.         # Right hip fracture s/p ORIF (December 2023)   - PT/OT    # GERD  - PTA PPI    # Low T4  Patient with new low T4 at 0.64 and TSH at 6.5, concerning for new hypothyroidism vs sick thyroid syndrome. TSH with appropriate response, more consistent with elevation in the setting of acute illness, levothyroxine is not indicated at this time, will monitor for symptom development. Consider repeat testing in outpatient setting once patient no longer acutely ill.       # Hx EBV viremia   # Hypogammaglobulinemia  # Chronic immunosuppression  Patient has been afebrile and has not had leukocytosis however she is under immunosuppression. Given acute respiratory failure and hx of recurrent pneumonias, she was started on empiric abx for concerns over new pneumonia. RVP and cultures no growth to date. Will plan to treat empirically for HAP for 7 days.    - EBV ordered - 27K (decreased compared to prior)         Lines/tubes/drains:  - CVC KALANI  (for TPN, is tunneled line)   - PIV x2  - PEG/J  - HD AV fistula, left arm        Diet: Adult Formula Drip Feeding: Continuous [employer] Peptide 1.5; Jejunostomy; Goal Rate: Begin at 10 mL/hr and hold; mL/hr; Do not advance tube feeding rate unless K+ is = or > 3.0, Mg++ is = or > 1.5, and Phos is = or > 1.9  Regular diet  Calorie Counts  parenteral nutrition - ADULT compounded formula        General Cares/Prophylaxis:    DVT Prophylaxis: Heparin SQ  GI Prophylaxis: PPI  Fluids: None  Code Status: Full    Clinically Significant Risk Factors        # Hypokalemia: Lowest K = 2.9 mmol/L in last 2 days, will replace as needed       # Hypoalbuminemia: Lowest albumin = 2.6 g/dL at 2/18/2024  5:13 AM, will monitor as appropriate             # Severe Malnutrition: based on nutrition assessment    # Financial/Environmental Concerns: none                Disposition Plan:      Expected Discharge Date: Unknown, pending vascular surgery consult  Destination: home with help/services  Discharge Comments:           The patient's care was discussed with the Attending Physician, Dr. Jayne Escobar MD     DP, Medical Observer  Medicine Service, [address]  Securely message with [employer] (more info)  Text page via [facility] Paging/Directory   See signed in provider for up to date coverage information        Resident/Fellow Attestation   I, Jannet Isaac MD, was present with the medical/EMILY student who participated in the service and in the documentation of the note.  I have verified the history and personally performed the physical exam and medical decision making.  I agree with the assessment and plan of care as documented in the note.      Jannet Isaac MD  PGY3  Date of Service (when I saw the patient): 02/21/24   ______________________________________________________________________        Interval History  No acute events since yesterday. Pt reports improvement in cough  "frequency. Tolerating feeds since yesterday without n/v. Continues to tolerate trickle tube feeds and TPN. Her left hand and arm swelling has been improving and less painful. She had 8 loose smelly bowel movements yesterday. No associated abdominal pain. She reports she has loose BM at baseline before hospitalization. No C.diff testing currently. Patient complaints of occasional 5/10 non-radiating headaches over the forehead since extubation. The headaches improve with tylenol. Rest and sleep makes it better. Been stable over past couple of days. Overall, she feels better than yesterday.        Physical Exam  BP (!) 152/88 (BP Location: Right arm, Patient Position: Supine)   Pulse 95   Temp 97.8  F (36.6  C) (Oral)   Resp 20   Ht 1.57 m (5' 1.81\")   Wt 47.4 kg (104 lb 9.6 oz)   SpO2 98%   BMI 19.25 kg/m         General: thin, alert and oriented  HEENT: Normocephalic, bruising on the right face around right orbit with hematoma and right neck.  Neuro: NAD, following commands, a&o  Pulm/Resp: clear lungs, no wheezing or crackles, able to clear secretions, no cough during exam  CV: RRR, warm extremities, capillary refill < 1 second on upper and lower extremities, 2+ pitting edema in left hand and entirety of left arm, no edema in right arm or hand, and 2+ pitting edema from bilateral ankles to feet, peripheral pulses in radial, DP, PT intact  Abdomen: Non-distended, PEG in place without erythema or drainage  Incisions/Skin: Bruising to face and right forearm, oozing around dialysis access on left upper forearm/fistula    Labs and Imaging for this encounter reviewed in chart review.      I have personally reviewed the following data over the past 24 hrs:        ULTRASOUND EXTREMITY ARTERIAL VENOUS DIALYSIS ACCESS GRAFT 2/21/2024  10:50 AM     CLINICAL HISTORY: evaluate left arm dialysis fistula for arterial and  venous flows, eval for stenosis? - pt with LUE swelling, no DVT on  prev US, eval for issues with " dialysis fistula.      COMPARISONS: CT chest, abdomen, and pelvis 2/7/2024.     REFERRING PROVIDER: DANIEL RIZVI     TECHNIQUE: Left brachial brachial dialysis graft evaluated with  grayscale, color Doppler, and spectral pulsed wave Doppler ultrasound.  Flow volumes obtained.     FINDINGS: LEFT:  Brachial artery, upper arm: 309/123 cm/s  Brachial artery, mid arm: 243/120 cm/s     Brachial artery, antecubital fossa, above anastomosis: 224/110 cm/s,  6.2 mm, 1959 cc/min Vol Flow     Brachial artery, distal to anastomosis: 172/54 cm/s, RETROGRADE     Radial artery, origin: 35/0 cm/s, antegrade  Radial artery, wrist: 70/0 cm/s, antegrade     Ulnar artery: origin: 34/0 cm/s, antegrade  Ulnar artery, wrist: 96/20 cm/s, arterial monophasic     Arterial anastomosis: 3.2 mm, 438/225, 1.96 velocity ratio     Graft, central to anastomosis: 423 cm/s     Graft, antecubital fossa: 330 cm/s, 5.2 mm, 1383 cc/min Vol Flow     Graft, mid arm: 146 cm/s     Graft, upper arm: 151 cm/s, 5.2 mm, 816 cc/min Vol Flow     Graft, peripheral to venous anastomosis: 177 cm/s     Venous anastomosis: 5.5 mm, 158 cm/s     Brachial vein, central to anastomosis: 425 cm/s, 2.69 velocity ratio     Axillary vein, outflow vein: 198 cm/s     Paired veins in the axilla. Second vein, not arterialized. 10 cm/s,  phasic, partially compressible, linear filling defects.     Subclavian vein: 11.3 mm, 144 cm/s  2.8 mm, 535 cm/s  11.0 mm,  154 cm/s = 75% diameter stenosis, 4.69 velocity ratio, stenosis was  present in the previous CT.     Subclavian vein, medial: 147 cm/s     Innominate vein: 76 cm/s     Left internal jugular vein: 130 cm/s, fully compressible.                                                                      IMPRESSION: Left brachial-brachial graft.  1. No arterial inflow stenosis.     2. Steal physiology, correlate for symptoms. Brachial artery distal to  the anastomosis is retrograde in flow back to the anastomosis. Radial  and ulnar  arteries are antegrade in flow at their origins.     3. Arterial anastomosis patent. 3.2 mm, 438/225, 1.96 velocity ratio.     4. Patent graft. Flow volumes exceed 600 mL/min.     5. Venous anastomosis patent. 5.5 mm, 158 cm/s.     6. Mid/lateral subclavian vein 75% diameter stenosis. Improving flow  may improve arm swelling but may also worsen steal physiology.     7. Non occlusive linear filling defects in the duplicated non  arterialized vein in the axilla, possible fibrin stranding.           KATHY BERNARD MD

## 2024-02-21 NOTE — PLAN OF CARE
"BP (!) 145/82 (BP Location: Right arm)   Pulse 95   Temp 98.2  F (36.8  C) (Oral)   Resp 16   Ht 1.57 m (5' 1.81\")   Wt 47.4 kg (104 lb 9.6 oz)   SpO2 98%   BMI 19.25 kg/m      Shift: 2432-8549  Isolation Status: none  VS: stable on room air, afebrile  Neuro: Aox4  Behaviors: calm, cooperative  BG: Q4 hours  Labs: C-diff lab ordered  Respiratory: WDL  Cardiac: WDL  Pain/Nausea: headache rated 7/10, denies nausea   PRN: Imodium given x1  Diet: Renal + TF + TPN + sudha counts  IV Access: right triple lumen internal jugular, right PIV, left AVF  Infusion(s): micafungin, zosyn, TPN@43mL/hour  Lines/Drains: Left AVF  GI/: occasionally incontinent of bowel. 6-8 loose stools/day. Aneuric   Skin: generalized bruising  Mobility: Ax1 + walker + gaitbelt  Events/Education: Worked with PT  Plan: Continue IV abx administration      "

## 2024-02-21 NOTE — PROGRESS NOTES
Transferred to:  at 0325  Status at time of transfer: Stable on room air. Refusing BiPAP while sleeping, desats occasionally and recovers quickly. Has had multiple loose stools tonight. TPN infusing. Tube feeds running at goal.   Belongings: Sent with patient  Dong removed? (if no, why?): NA  Chart and medications: Sent with patient  Family notified: Day team will notify.     Ximena Danielle RN on 2/21/2024 at 3:32 AM

## 2024-02-21 NOTE — CONSULTS
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 She required intubation for a short period of time. 2/18 bronchoscopy cultures are pending - ngtd. Remains in empiric pip/tazo for coverage of pneumonia. Discussed the case with transplant pulmonology and they felt at this time they did not need assistance with further evaluation of infection. Given the degree of tenderness over the right tunneled line site I recommended obtaining blood cultures from it. Her symptoms are subacute to chronic in nature so likely wouldn't be fully explained by an acute episode of bacteremia. Will sign off. Call with questions or additional help is needed.      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 Intubated 2/18-2/19. Now on 1L NC. Afebrile.     She complains of generalized malaise, pain in the right hip and left arm where AV fistula is. Denies HA, vision changes, cough, SOB, abdominal pain. She has had intermittent nausea and diarrhea that is chronic. No urinary issues. No other joint pain. No rashes. She feel a few weeks ago and has a  "resolving bruise on her right face, neck. Had pins placed in her right hip after the first fall.     Right chest tunneled line placed about 7 days ago. Notices persistent pain at this site.   C diff and chronic diarrhea - has lost 40# over the past 1 year. Also concern for vagal injury induced gastroparesis and and small bowel hypomotility. Receiving TPN.   2/7 CT C/A/P and 2/17 CT chest reviewed.   2/18 UA with 66 WBC, WBC clumps, urine culture ngtd    2/18 bronchoscopy  -cell count 105 nucleated cells 69% monocytes  -fungal culture - yeast  -CMV 99 copies  -AFB, Nocardia, Legionella, aerobic bacterial, Leginolla,  ngtd  -RVP negative    Serum CMV PCR not detected  MRSA nares, cryptococcal antigen, Aspergillus GM, 1-3 BD glucan negative  Blood cultures obtained from peripheral sites  EBV viral load 28K/log 4.4 (last 96K/log 5.0)    Immunosuppression includes tacrolimus, prednisone. May also consider prospera.     Antibiotic regimen:  Prophylaxis - dapsone  Pip/tazo 2/17-current    Physical Exam     Vital signs:  BP (!) 152/88 (BP Location: Right arm, Patient Position: Supine)   Pulse 95   Temp 97.8  F (36.6  C) (Oral)   Resp 20   Ht 1.57 m (5' 1.81\")   Wt 47.4 kg (104 lb 9.6 oz)   SpO2 98%   BMI 19.25 kg/m      GENERAL: alert and no distress  NECK: bruising over the right face, neck; right chest tunneled line, pain over the catheter of the line  RESP: lungs clear to auscultation - no rales, rhonchi or wheezes, not on oxygen  CV: regular rate and rhythm, normal S1 S2, no S3 or S4, no murmur  ABDOMEN: soft, nontender, no hepatosplenomegaly, no masses and bowel sounds normal  MS: left arm is more swollen, AV fistula + bruit and thrills  SKIN: bruising over the right face and left arm  NEURO: generalized weakness, mentation intact and speech normal  PSYCH: mentation appears normal, affect normal/bright    Data   Laboratory data and imaging listed below was reviewed prior to this encounter.     Microbiology:  "   Culture   Date Value Ref Range Status   02/19/2024 No growth after 2 days  Preliminary   02/18/2024 No growth after 2 days  Preliminary   02/18/2024 No Growth  Final   02/18/2024 Yeast (A)  Preliminary   02/18/2024 Culture negative, monitoring continues  Preliminary   02/18/2024 No growth after 3 days  Preliminary   02/18/2024 <10,000 CFU/mL Enterococcus faecium (A)  Preliminary   02/17/2024 No Growth  Final   02/17/2024 No growth after 3 days  Preliminary   07/12/2023 No Growth  Final   07/12/2023 No Growth  Final   07/12/2023 1+ Normal josue  Final   07/12/2023 1+ Haemophilus influenzae (A)  Final   05/18/2023 10,000-50,000 CFU/mL Mixture of urogenital josue  Final   05/17/2023 No Growth  Final   05/17/2023 No Growth  Final   04/19/2023 No Growth  Final   04/19/2023 No Growth  Final   04/19/2023 1+ Normal josue  Final   01/25/2023 No Growth  Final   01/25/2023 No Growth  Final   01/25/2023 2+ Normal josue  Final   11/09/2022 No Growth  Final   11/09/2022 No Growth  Final   11/09/2022 1+ Normal josue  Final   10/03/2022 1+ Staphylococcus epidermidis (A)  Final     Comment:     Susceptibilities not routinely done   09/19/2022 >100,000 CFU/mL Mixture of urogenital josue  Final   09/19/2022 No Growth  Final   09/15/2022 No Growth  Final   09/15/2022 No anaerobic organisms isolated  Final   09/15/2022 No Growth  Final   09/13/2022 No Growth  Final   09/13/2022 No Growth  Final   09/13/2022 No Growth  Final   09/13/2022 No Growth  Final   09/13/2022 No Growth  Final   09/13/2022 No Growth  Final   09/13/2022 2+ Normal josue  Final   08/29/2022 No Growth  Final   08/26/2022   Final    >10 Squamous epithelial cells/low power field indicates oral contamination. Please recollect.   08/12/2022 No Growth  Final   08/12/2022 No anaerobic organisms isolated  Final   08/12/2022 No Growth  Final   07/25/2022 No Growth  Final   07/25/2022 No anaerobic organisms isolated  Final   07/25/2022 No Growth  Final   07/20/2022 No Growth   Final   07/20/2022 No Growth  Final   07/12/2022 1+ Normal josue  Final   07/12/2022 No Growth  Final   07/05/2022 No Growth  Final   07/04/2022 No Growth  Final   07/04/2022 No Growth  Final   06/30/2022 No Growth  Final   06/30/2022 No Growth  Final   06/29/2022 No Growth  Final   06/29/2022 No Growth  Final   06/28/2022 No Growth  Final   06/28/2022 2+ Normal josue  Final   06/28/2022 1+ Stenotrophomonas maltophilia (A)  Final     Comment:     Susceptibilities done on previous cultures   06/28/2022 2+ Streptococcus pneumoniae (A)  Final     Comment:     Susceptibilities done on previous cultures   06/28/2022 No Growth  Final   06/28/2022 1+ Normal josue  Final   06/28/2022 4+ Normal josue  Final   06/28/2022 3+ Stenotrophomonas maltophilia (A)  Final   06/28/2022 4+ Streptococcus pneumoniae (A)  Final     Hematology Studies:    Recent Labs   Lab Test 02/21/24  0430 02/20/24  0448 02/19/24  0610 02/18/24  1418 02/18/24  0842 02/17/24  1830 02/15/24  0620 02/14/24  0807   WBC 3.4* 4.4 3.1*  --  3.0* 4.2  --  4.5   ANEU 1.5* 2.6 1.8  --  2.0 3.2  --  2.7   AEOS 0.1 0.2 0.1  --  0.1 0.0  --  0.1   HGB 7.8* 8.2* 7.1* 7.5* 6.3* 7.8*   < > 8.2*   * 111* 111*  --  118* 121*  --  123*   * 159 146*  --  138* 145*   < > 175    < > = values in this interval not displayed.      Metabolic Studies:   Recent Labs   Lab Test 02/21/24  0430 02/20/24  2245 02/20/24  1653 02/20/24  1448 02/20/24  0448   *  133* 134* 134* 132* 131*   POTASSIUM 3.4  3.5  3.5 3.4  3.4 3.2* 2.9*  2.9* 3.6  3.6   CHLORIDE 97*  97* 98 97* 96* 93*   CO2 25  25 27 27 24 21*   BUN 24.1*  24.1* 20.2 15.5 11.4 40.9*   CR 2.06*  2.06* 1.87* 1.53* 1.10* 3.13*   GFRESTIMATED 27*  27* 30* 38* 57* 16*     Hepatic Studies:   Recent Labs   Lab Test 02/21/24  0430 02/20/24  0448 02/19/24  0610 02/18/24  0513 02/17/24  1830 02/17/24  1243   BILITOTAL 0.2 0.2 0.2 0.2 0.2 0.2   ALKPHOS 64 60 53 51 62 79   ALBUMIN 2.9* 3.0* 2.7* 2.6* 3.1*  3.1*   AST 15 17 14 15 13 15   ALT <5 <5 <5 <5 <5 <5

## 2024-02-22 ENCOUNTER — APPOINTMENT (OUTPATIENT)
Dept: OCCUPATIONAL THERAPY | Facility: CLINIC | Age: 64
DRG: 640 | End: 2024-02-22
Attending: INTERNAL MEDICINE
Payer: MEDICARE

## 2024-02-22 ENCOUNTER — APPOINTMENT (OUTPATIENT)
Dept: PHYSICAL THERAPY | Facility: CLINIC | Age: 64
DRG: 640 | End: 2024-02-22
Attending: INTERNAL MEDICINE
Payer: MEDICARE

## 2024-02-22 LAB
ALBUMIN SERPL BCG-MCNC: 3 G/DL (ref 3.5–5.2)
ALP SERPL-CCNC: 66 U/L (ref 40–150)
ALT SERPL W P-5'-P-CCNC: <5 U/L (ref 0–50)
ANION GAP SERPL CALCULATED.3IONS-SCNC: 10 MMOL/L (ref 7–15)
ANION GAP SERPL CALCULATED.3IONS-SCNC: 14 MMOL/L (ref 7–15)
AST SERPL W P-5'-P-CCNC: 25 U/L (ref 0–45)
BACTERIA BLD CULT: NO GROWTH
BACTERIA UR CULT: ABNORMAL
BASE EXCESS BLDV CALC-SCNC: -2.2 MMOL/L (ref -3–3)
BASOPHILS # BLD AUTO: ABNORMAL 10*3/UL
BASOPHILS # BLD MANUAL: 0 10E3/UL (ref 0–0.2)
BASOPHILS NFR BLD AUTO: ABNORMAL %
BASOPHILS NFR BLD MANUAL: 0 %
BILIRUB SERPL-MCNC: 0.2 MG/DL
BUN SERPL-MCNC: 21.4 MG/DL (ref 8–23)
BUN SERPL-MCNC: 43 MG/DL (ref 8–23)
CALCIUM SERPL-MCNC: 8 MG/DL (ref 8.8–10.2)
CALCIUM SERPL-MCNC: 8.9 MG/DL (ref 8.8–10.2)
CHLORIDE SERPL-SCNC: 100 MMOL/L (ref 98–107)
CHLORIDE SERPL-SCNC: 98 MMOL/L (ref 98–107)
CREAT SERPL-MCNC: 1.62 MG/DL (ref 0.51–0.95)
CREAT SERPL-MCNC: 2.79 MG/DL (ref 0.51–0.95)
CRP SERPL-MCNC: 7.57 MG/L
DEPRECATED HCO3 PLAS-SCNC: 22 MMOL/L (ref 22–29)
DEPRECATED HCO3 PLAS-SCNC: 27 MMOL/L (ref 22–29)
EGFRCR SERPLBLD CKD-EPI 2021: 19 ML/MIN/1.73M2
EGFRCR SERPLBLD CKD-EPI 2021: 36 ML/MIN/1.73M2
EOSINOPHIL # BLD AUTO: ABNORMAL 10*3/UL
EOSINOPHIL # BLD MANUAL: 0 10E3/UL (ref 0–0.7)
EOSINOPHIL NFR BLD AUTO: ABNORMAL %
EOSINOPHIL NFR BLD MANUAL: 1 %
ERYTHROCYTE [DISTWIDTH] IN BLOOD BY AUTOMATED COUNT: 19.8 % (ref 10–15)
GLUCOSE SERPL-MCNC: 144 MG/DL (ref 70–99)
GLUCOSE SERPL-MCNC: 145 MG/DL (ref 70–99)
HCO3 BLDV-SCNC: 25 MMOL/L (ref 21–28)
HCT VFR BLD AUTO: 27.3 % (ref 35–47)
HGB BLD-MCNC: 8.3 G/DL (ref 11.7–15.7)
IMM GRANULOCYTES # BLD: ABNORMAL 10*3/UL
IMM GRANULOCYTES NFR BLD: ABNORMAL %
LYMPHOCYTES # BLD AUTO: ABNORMAL 10*3/UL
LYMPHOCYTES # BLD MANUAL: 0.8 10E3/UL (ref 0.8–5.3)
LYMPHOCYTES NFR BLD AUTO: ABNORMAL %
LYMPHOCYTES NFR BLD MANUAL: 18 %
MAGNESIUM SERPL-MCNC: 1.8 MG/DL (ref 1.7–2.3)
MAGNESIUM SERPL-MCNC: 2.2 MG/DL (ref 1.7–2.3)
MCH RBC QN AUTO: 35.3 PG (ref 26.5–33)
MCHC RBC AUTO-ENTMCNC: 30.4 G/DL (ref 31.5–36.5)
MCV RBC AUTO: 116 FL (ref 78–100)
MONOCYTES # BLD AUTO: ABNORMAL 10*3/UL
MONOCYTES # BLD MANUAL: 0.3 10E3/UL (ref 0–1.3)
MONOCYTES NFR BLD AUTO: ABNORMAL %
MONOCYTES NFR BLD MANUAL: 7 %
MYELOCYTES # BLD MANUAL: 0.3 10E3/UL
MYELOCYTES NFR BLD MANUAL: 6 %
NEUTROPHILS # BLD AUTO: ABNORMAL 10*3/UL
NEUTROPHILS # BLD MANUAL: 3 10E3/UL (ref 1.6–8.3)
NEUTROPHILS NFR BLD AUTO: ABNORMAL %
NEUTROPHILS NFR BLD MANUAL: 67 %
NRBC # BLD AUTO: 0 10E3/UL
NRBC BLD AUTO-RTO: 1 /100
O2/TOTAL GAS SETTING VFR VENT: 21 %
OXYHGB MFR BLDV: 89 % (ref 70–75)
PCO2 BLDV: 58 MM HG (ref 40–50)
PH BLDV: 7.25 [PH] (ref 7.32–7.43)
PHOSPHATE SERPL-MCNC: 2.5 MG/DL (ref 2.5–4.5)
PHOSPHATE SERPL-MCNC: 2.5 MG/DL (ref 2.5–4.5)
PHOSPHATE SERPL-MCNC: 4 MG/DL (ref 2.5–4.5)
PLAT MORPH BLD: ABNORMAL
PLATELET # BLD AUTO: 202 10E3/UL (ref 150–450)
PO2 BLDV: 69 MM HG (ref 25–47)
POTASSIUM SERPL-SCNC: 3.5 MMOL/L (ref 3.4–5.3)
POTASSIUM SERPL-SCNC: 4.1 MMOL/L (ref 3.4–5.3)
PROMYELOCYTES # BLD MANUAL: 0 10E3/UL
PROMYELOCYTES NFR BLD MANUAL: 1 %
PROT SERPL-MCNC: 5.3 G/DL (ref 6.4–8.3)
RBC # BLD AUTO: 2.35 10E6/UL (ref 3.8–5.2)
RBC MORPH BLD: ABNORMAL
SAO2 % BLDV: 91.9 % (ref 70–75)
SODIUM SERPL-SCNC: 134 MMOL/L (ref 135–145)
SODIUM SERPL-SCNC: 137 MMOL/L (ref 135–145)
WBC # BLD AUTO: 4.5 10E3/UL (ref 4–11)

## 2024-02-22 PROCEDURE — 258N000003 HC RX IP 258 OP 636: Performed by: STUDENT IN AN ORGANIZED HEALTH CARE EDUCATION/TRAINING PROGRAM

## 2024-02-22 PROCEDURE — 85027 COMPLETE CBC AUTOMATED: CPT

## 2024-02-22 PROCEDURE — 99233 SBSQ HOSP IP/OBS HIGH 50: CPT | Mod: 24 | Performed by: INTERNAL MEDICINE

## 2024-02-22 PROCEDURE — 97535 SELF CARE MNGMENT TRAINING: CPT | Mod: GO | Performed by: OCCUPATIONAL THERAPIST

## 2024-02-22 PROCEDURE — 250N000011 HC RX IP 250 OP 636: Performed by: INTERNAL MEDICINE

## 2024-02-22 PROCEDURE — 80053 COMPREHEN METABOLIC PANEL: CPT | Performed by: INTERNAL MEDICINE

## 2024-02-22 PROCEDURE — 250N000009 HC RX 250: Performed by: STUDENT IN AN ORGANIZED HEALTH CARE EDUCATION/TRAINING PROGRAM

## 2024-02-22 PROCEDURE — 250N000011 HC RX IP 250 OP 636

## 2024-02-22 PROCEDURE — 999N000157 HC STATISTIC RCP TIME EA 10 MIN

## 2024-02-22 PROCEDURE — 250N000011 HC RX IP 250 OP 636: Performed by: STUDENT IN AN ORGANIZED HEALTH CARE EDUCATION/TRAINING PROGRAM

## 2024-02-22 PROCEDURE — 84100 ASSAY OF PHOSPHORUS: CPT

## 2024-02-22 PROCEDURE — 99207 PR NO BILLABLE SERVICE THIS VISIT: CPT | Performed by: SURGERY

## 2024-02-22 PROCEDURE — 36415 COLL VENOUS BLD VENIPUNCTURE: CPT

## 2024-02-22 PROCEDURE — 97530 THERAPEUTIC ACTIVITIES: CPT | Mod: GP

## 2024-02-22 PROCEDURE — 97116 GAIT TRAINING THERAPY: CPT | Mod: GP

## 2024-02-22 PROCEDURE — 82374 ASSAY BLOOD CARBON DIOXIDE: CPT

## 2024-02-22 PROCEDURE — 99233 SBSQ HOSP IP/OBS HIGH 50: CPT | Performed by: PHYSICIAN ASSISTANT

## 2024-02-22 PROCEDURE — 250N000009 HC RX 250

## 2024-02-22 PROCEDURE — 250N000013 HC RX MED GY IP 250 OP 250 PS 637: Performed by: STUDENT IN AN ORGANIZED HEALTH CARE EDUCATION/TRAINING PROGRAM

## 2024-02-22 PROCEDURE — 250N000013 HC RX MED GY IP 250 OP 250 PS 637

## 2024-02-22 PROCEDURE — 250N000009 HC RX 250: Performed by: INTERNAL MEDICINE

## 2024-02-22 PROCEDURE — 86140 C-REACTIVE PROTEIN: CPT | Performed by: INTERNAL MEDICINE

## 2024-02-22 PROCEDURE — 250N000013 HC RX MED GY IP 250 OP 250 PS 637: Performed by: INTERNAL MEDICINE

## 2024-02-22 PROCEDURE — 85007 BL SMEAR W/DIFF WBC COUNT: CPT

## 2024-02-22 PROCEDURE — 82565 ASSAY OF CREATININE: CPT

## 2024-02-22 PROCEDURE — 82805 BLOOD GASES W/O2 SATURATION: CPT | Performed by: INTERNAL MEDICINE

## 2024-02-22 PROCEDURE — 120N000002 HC R&B MED SURG/OB UMMC

## 2024-02-22 PROCEDURE — 250N000012 HC RX MED GY IP 250 OP 636 PS 637: Performed by: INTERNAL MEDICINE

## 2024-02-22 PROCEDURE — 83735 ASSAY OF MAGNESIUM: CPT

## 2024-02-22 PROCEDURE — 634N000001 HC RX 634: Mod: JZ | Performed by: STUDENT IN AN ORGANIZED HEALTH CARE EDUCATION/TRAINING PROGRAM

## 2024-02-22 PROCEDURE — 99232 SBSQ HOSP IP/OBS MODERATE 35: CPT | Mod: GC | Performed by: STUDENT IN AN ORGANIZED HEALTH CARE EDUCATION/TRAINING PROGRAM

## 2024-02-22 PROCEDURE — 36415 COLL VENOUS BLD VENIPUNCTURE: CPT | Performed by: INTERNAL MEDICINE

## 2024-02-22 PROCEDURE — 99221 1ST HOSP IP/OBS SF/LOW 40: CPT | Mod: 24 | Performed by: NURSE PRACTITIONER

## 2024-02-22 RX ADMIN — Medication: at 07:54

## 2024-02-22 RX ADMIN — METOPROLOL TARTRATE 25 MG: 100 TABLET, FILM COATED ORAL at 12:43

## 2024-02-22 RX ADMIN — LOPERAMIDE HYDROCHLORIDE 2 MG: 2 CAPSULE ORAL at 12:47

## 2024-02-22 RX ADMIN — MAGNESIUM SULFATE HEPTAHYDRATE: 500 INJECTION, SOLUTION INTRAMUSCULAR; INTRAVENOUS at 20:42

## 2024-02-22 RX ADMIN — ACETAMINOPHEN 650 MG: 325 TABLET, FILM COATED ORAL at 01:00

## 2024-02-22 RX ADMIN — LOPERAMIDE HYDROCHLORIDE 2 MG: 2 CAPSULE ORAL at 06:57

## 2024-02-22 RX ADMIN — METOPROLOL TARTRATE 25 MG: 100 TABLET, FILM COATED ORAL at 20:45

## 2024-02-22 RX ADMIN — POTASSIUM & SODIUM PHOSPHATES POWDER PACK 280-160-250 MG 1 PACKET: 280-160-250 PACK at 12:45

## 2024-02-22 RX ADMIN — TACROLIMUS 4 MG: 5 CAPSULE ORAL at 12:46

## 2024-02-22 RX ADMIN — HEPARIN SODIUM 5000 UNITS: 5000 INJECTION, SOLUTION INTRAVENOUS; SUBCUTANEOUS at 12:46

## 2024-02-22 RX ADMIN — LIDOCAINE 4% 1 PATCH: 40 PATCH TOPICAL at 20:44

## 2024-02-22 RX ADMIN — Medication 40 MG: at 21:10

## 2024-02-22 RX ADMIN — PREDNISONE 2.5 MG: 2.5 TABLET ORAL at 20:46

## 2024-02-22 RX ADMIN — PIPERACILLIN AND TAZOBACTAM 2.25 G: 2; .25 INJECTION, POWDER, FOR SOLUTION INTRAVENOUS at 12:39

## 2024-02-22 RX ADMIN — PREDNISONE 5 MG: 5 TABLET ORAL at 12:45

## 2024-02-22 RX ADMIN — ACETAMINOPHEN 650 MG: 325 TABLET, FILM COATED ORAL at 18:23

## 2024-02-22 RX ADMIN — CYANOCOBALAMIN TAB 500 MCG 500 MCG: 500 TAB at 12:46

## 2024-02-22 RX ADMIN — EPOETIN ALFA-EPBX 8000 UNITS: 10000 INJECTION, SOLUTION INTRAVENOUS; SUBCUTANEOUS at 10:21

## 2024-02-22 RX ADMIN — CALCIUM CARBONATE 600 MG (1,500 MG)-VITAMIN D3 400 UNIT TABLET 1 TABLET: at 12:44

## 2024-02-22 RX ADMIN — SODIUM CHLORIDE 300 ML: 9 INJECTION, SOLUTION INTRAVENOUS at 07:55

## 2024-02-22 RX ADMIN — ACETAMINOPHEN 650 MG: 325 TABLET, FILM COATED ORAL at 06:28

## 2024-02-22 RX ADMIN — PIPERACILLIN AND TAZOBACTAM 2.25 G: 2; .25 INJECTION, POWDER, FOR SOLUTION INTRAVENOUS at 01:00

## 2024-02-22 RX ADMIN — LOPERAMIDE HYDROCHLORIDE 2 MG: 2 CAPSULE ORAL at 21:02

## 2024-02-22 RX ADMIN — POTASSIUM & SODIUM PHOSPHATES POWDER PACK 280-160-250 MG 1 PACKET: 280-160-250 PACK at 20:46

## 2024-02-22 RX ADMIN — HEPARIN SODIUM 500 UNITS/HR: 1000 INJECTION INTRAVENOUS; SUBCUTANEOUS at 07:56

## 2024-02-22 RX ADMIN — ACETAMINOPHEN 650 MG: 325 TABLET, FILM COATED ORAL at 12:47

## 2024-02-22 RX ADMIN — CALCIUM CARBONATE 600 MG (1,500 MG)-VITAMIN D3 400 UNIT TABLET 1 TABLET: at 18:23

## 2024-02-22 RX ADMIN — SODIUM CHLORIDE 250 ML: 9 INJECTION, SOLUTION INTRAVENOUS at 07:54

## 2024-02-22 RX ADMIN — ONDANSETRON 4 MG: 2 INJECTION INTRAMUSCULAR; INTRAVENOUS at 20:50

## 2024-02-22 RX ADMIN — TACROLIMUS 4.5 MG: 5 CAPSULE ORAL at 18:23

## 2024-02-22 RX ADMIN — ONDANSETRON 4 MG: 2 INJECTION INTRAMUSCULAR; INTRAVENOUS at 14:23

## 2024-02-22 RX ADMIN — HEPARIN SODIUM 5000 UNITS: 5000 INJECTION, SOLUTION INTRAVENOUS; SUBCUTANEOUS at 01:00

## 2024-02-22 RX ADMIN — Medication 40 MG: at 12:43

## 2024-02-22 RX ADMIN — PIPERACILLIN AND TAZOBACTAM 2.25 G: 2; .25 INJECTION, POWDER, FOR SOLUTION INTRAVENOUS at 06:41

## 2024-02-22 RX ADMIN — LIDOCAINE AND PRILOCAINE: 25; 25 CREAM TOPICAL at 06:28

## 2024-02-22 RX ADMIN — PIPERACILLIN AND TAZOBACTAM 2.25 G: 2; .25 INJECTION, POWDER, FOR SOLUTION INTRAVENOUS at 18:23

## 2024-02-22 ASSESSMENT — ACTIVITIES OF DAILY LIVING (ADL)
ADLS_ACUITY_SCORE: 37
ADLS_ACUITY_SCORE: 38
ADLS_ACUITY_SCORE: 37
ADLS_ACUITY_SCORE: 36
ADLS_ACUITY_SCORE: 37
ADLS_ACUITY_SCORE: 37
ADLS_ACUITY_SCORE: 38
ADLS_ACUITY_SCORE: 36
ADLS_ACUITY_SCORE: 38
ADLS_ACUITY_SCORE: 38
ADLS_ACUITY_SCORE: 36
ADLS_ACUITY_SCORE: 36
ADLS_ACUITY_SCORE: 37
ADLS_ACUITY_SCORE: 37
ADLS_ACUITY_SCORE: 38
ADLS_ACUITY_SCORE: 36
ADLS_ACUITY_SCORE: 37
ADLS_ACUITY_SCORE: 36
ADLS_ACUITY_SCORE: 38
ADLS_ACUITY_SCORE: 38

## 2024-02-22 NOTE — PROGRESS NOTES
Lung Transplant Consult Follow Up Note   February 22, 2024            Assessment and Plan:   Sofie Rodriguez is a 63 year old female with h/o bilateral lung transplant for COPD on 6/28/22 with course complicated by post-operative hemidiaphragm palsy, recurrent PNAs, positive DSA, EBV viremia, hypogammaglobulinemia, severe gastroparesis s/p G/J tube placement 7/27/22 with pyloric botox 1/25/23, GI bleed 2/2 pyloric ulcer, hemobilia s/p ERCP and MRCP, chronic diarrhea, recurrent C diff colitis, and ESRD on HD.   Admitted May 2023 for FTT, supposed to be readmitted in July for FTT, but refused. Admitted to OSH 12/4-12/31 in December for right hip fracture s/p ORIF, now with significant deconditioning and ongoing severe malnutrition with gastroparesis, small bowel hypermotility and failure to tolerate any tube feeds.   After extensive discussion with the dietician, GI and ultimately convincing the patient, the patient was admitted on 2/10 for initiation of TPN/lipids. She is s/p port placement with persistent pain at port site. GI recommends trickle feeds for maintaining bowel and CT enterography to look for structural abnormalities (unable due to large bolus enteral contrast required for the study). She was transferred to the ICU on 2/17 with worsening mental status, worsening respiratory acidosis despite Bpap use, ultimately requiring intubation and mechanical ventilation. CT chest concerning for new infection vs volume overload and started on empiric antimicrobial therapy (micafungin, zosyn, vanco) however extubated 2/19 and infectious work up is negative to date. Persistent CO2 retention. Tolerating TPN and moderate PO intake.     Recommendations:   - Complete 7 day course of zosyn  - Electrolyte replacement as per primary team to manage patients probable refeeding syndrome  - Strict I/Os and daily weights, appreciate nephrology assistance with volume management  - O2 to keep SaO2 > 92%  - Christin 2/18 (pending)  -  Tacro level therapeutic at 7.2 (2/21). Contiunue current dose. Recheck 2/23 (ordered)  - Trial of high flow FiO2 21% for CO2 retention (patient refuses biPAP)  - Follow daily VBG  - OK to stop TF if patient taking PO.     S/p bilateral lung transplant:   Right hemidiaphragm palsy:   Suspected CARLEE:  New consolidated nodular opacities and GGO concerning for infection:  Severely deconditioned. CMV 2/7 negative. ImmuKnow 108 and cfDNA 0.12 on 1/10. CT 2/7 with decreased MARLON opacities, new tree in bud RLL opacities without new symptoms. PFTs unchanged in clinic (but ATS not met), remain significantly below her baseline (1.4-1.5L). Noted increased dyspnea since the Port-A-Cath placement.  Review of notes indicate patient should be on 2 L o2 at night from prior overnight O2 study in Jan 2023. Also reported a slight increase in cough. She was requiring O2 for comfort only but decompensated on 2/17 with worsening encephalopathy, respiratory acidosis (pCO2 up to 106). CT chest 2/17 showed very patchy and new consolidative, nodular opacities, GGO primarily in the bases and intralobular septal thickening concerning for pulmonary edema and volume overload along with new, possibly fungal vs. Atypical infection vs. PTLD (less likely but in the differential) vs. Septic emboli.  Based on negative infectious work up at this time, would favor volume overload in the setting of initiation of TPN  - 2/22 VBG 7.25/58, 2/21 VBG 7.31/56  Persistent CO2 retention since extubation although may be chronic based on fall 2022 VBGs. The patient refuses BiPAP. Consider a trial of high flow with FiO2 21% in an effort to reduce PCO2. Monitor with VBG.  - bronch with lavage of RML 2/18 showed very friable tissue  - 2/18 bronch cultures NGTD  - 2/18 Galactomannan (-)  - 2/18 Fungitell (-)  - Agree with stopping micafungin.  - CRP improving  - DSA 2/7 with persistent DQ B2, MFI increased to 6966, see below  - Prospera to evaluate significance of  positive DSA (2/18 pending )  - repeat overnight oximetry study 24-48h before discharge.  - schedule follow up with sleep to discuss possible CPAP given recommendations from August sleep study  - appreciate strict I&O and volume management as per primary and nephrology     Immunosuppression:  - Tacrolimus 4 mg qAM and tacrolimus 4.5 mg qPM. Goal level 7-9. 2/21 level 7.2 (10.5h) Continue current dosing, repeat level 2/23 (ordered).   - Await Prospera (pending from 2/18) for consideration of IS regimen alterations  - Continue Prednisone 5 mg/2.5 mg     Prophylaxis:   - Dapsone 50 mg q MWf for PJP ppx     Positive DSA: no prior treatment for AMR, has been watched for quite some time given no pulmonary symptoms, prior PFTs stability and significant and ongoing failure to thrive. Last DSA improved to 5723, however will ongoing lower PFTs, may need to consider AMR treatment, however, unclear how she would tolerate.   -  DSA 2/7 with persistent DQ B2, MFI increased to 6966, see below  - Check Prospera to evaluate significance of positive DSA (pending 2/18)     ESRD: Dialysis dependent.  Suspect her respiratory symptoms were volume overload secondary to initiation of TPN.  CT chest suggesting volume overload. Has undergone multiple days in a row of dialysis to try to pull fluid  -BNP >70,000.  -Dialysis as per nephrology.  -May require daily dialysis until euvolemic again  -Monitor strict ins and outs      EBV viremia: last level of 96K (2/7), CT c/a/p (2/7) without adenopathy.  - EBV 2/18 - 13587.    - CMV 2/19 (-)     Acute metabolic encephalopathy - resolved     Severe protein calorie malnutrition:  FTT:   Gastroparesis s/p PEG/J s/p botox and G-POEM:   SB Hypomotility  Pyloric ulcer:  Chronic nausea and osmotic diarrhea:  SIBO s/p rifaximin:   Recurrent C diff colitis: chronic diarrhea since transplant with recurrent episodes of C diff. GI previously consulted, felt stools consistent with osmotic diarrhea. Over the  last year has lost 40 lbs, unable to tolerate any combination of TF, most recently elemental formula. Normal fecal elastase last May. Following with Dr. Navarro who feels her main two issues are vagal injury induced gastroparesis and probably small bowel hypomotility. Her intolerance to J-tube feeds suggests the latter to be a significant issue (notes in a message that there are no motility experts at the  and he is limited in what can be offered). Now s/p port placement with TPN and lipids. GI recommending trickle feeds and CT to assess for structural issues. Now taking modest PO.  -close monitoring as patient is high risk for refeeding syndrome  -Recs per primary and GI  -Concern that patient will not tolerate CT enterography according to chart notes as she will require 1500 mL enteral contrast bolus which she is unlikely to tolerate given her gastroparesis and reduced bowel function.  - OK to hold trickle TF if patient is tolerating PO consistently.     We appreciate the excellent care provided by the ICU team.    Will continue to follow along closely, please do not hesitate to call with any questions or concerns.         Ajay Castillo MD  [phone]              Interval History:   Persistent frontal H/A. Port pain gradually improving  Dyspnea at rest: None  Dyspnea on exertion: none but minimal activity  Cough: occasional  Sputum: small amt, swallowed  Hemoptysis: none   Chest Pain: None           Review of Systems:   C: NEGATIVE for fever, chills  INTEGUMENTARY/SKIN: no rash or obvious new lesions  ENT/MOUTH: no sore throat, new sinus pain or nasal drainage  RESP: see interval history  CV: NEGATIVE for chest pain, palpitations. Persistent peripheral edema  GI: occ  nausea, no vomiting, abd cramps related to BM. Persistent frequent loose stools  : no dysuria  MUSCULOSKELETAL: mild pain, at previous hip fx            Medications:      acetaminophen  650 mg Oral or Feeding Tube Q6H    calcium carbonate-vitamin D   1 tablet Per J Tube TID w/meals    cyanocobalamin  500 mcg Per Feeding Tube Daily    dapsone  50 mg Per J Tube Q Mon Wed Fri AM    epoetin tre-epbx  8,000 Units Intravenous Once in dialysis/CRRT    heparin ANTICOAGULANT  5,000 Units Subcutaneous Q12H    lidocaine  1 patch Transdermal Q24h    lipids plant base  250 mL Intravenous Once per day on Monday Wednesday Friday    metoprolol  25 mg Per J Tube BID    pantoprazole  40 mg Per J Tube BID    piperacillin-tazobactam  2.25 g Intravenous Q6H    potassium & sodium phosphates  1 packet Oral or Feeding Tube BID    predniSONE  5 mg Per J Tube QAM    And    predniSONE  2.5 mg Per J Tube QPM    [Held by provider] sevelamer carbonate (RENVELA)  0.8 g Oral BID    tacrolimus  4 mg Per J Tube QAM    And    tacrolimus  4.5 mg Per J Tube QPM     albumin human, albuterol, calcium carbonate, dextrose, dextrose, glucose **OR** dextrose **OR** glucagon, lidocaine (buffered or not buffered), lidocaine (buffered or not buffered), lidocaine (buffered or not buffered), lidocaine-prilocaine, loperamide, naloxone **OR** naloxone **OR** naloxone **OR** naloxone, ondansetron **OR** ondansetron, senna-docusate **OR** senna-docusate, sodium chloride, sodium chloride (PF), sodium chloride 0.9%, sodium chloride 0.9%, - MEDICATION INSTRUCTIONS -         Physical Exam:   Temp:  [97.8  F (36.6  C)-98.2  F (36.8  C)] 98  F (36.7  C)  Pulse:  [87-96] 91  Resp:  [12-30] 18  BP: (126-153)/(73-91) 126/77  SpO2:  [94 %-99 %] 96 %    Intake/Output Summary (Last 24 hours) at 2/22/2024 0908  Last data filed at 2/22/2024 0200  Gross per 24 hour   Intake 158.7 ml   Output --   Net 158.7 ml     Examined on dialysis    Constitutional:   Awake, alert and in no apparent distress     Eyes:   nonicteric     ENT:    oral mucosa moist without lesions     Neck:   Supple without supraclavicular or cervical lymphadenopathy     Lungs:   Mildly diminished  air flow.  Faint scattered right crackles. No rhonchi.  No  wheezes.     Cardiovascular:   Normal S1 and S2.  RRR.  I/VI sys murmur. No gallop. No rub.     Abdomen:   NABS, soft, nondistended, nontender.  No HSM.     Musculoskeletal:   2+ BLE and LUE edema     Neurologic:   Alert and conversant.     Skin:   Warm, dry.  No rash on limited exam.             Data:   All laboratory and imaging data reviewed.    Results for orders placed or performed during the hospital encounter of 02/10/24 (from the past 24 hour(s))   US Ext Arterial Venous Dialys Acs Graft    Narrative    ULTRASOUND EXTREMITY ARTERIAL VENOUS DIALYSIS ACCESS GRAFT 2/21/2024  10:50 AM    CLINICAL HISTORY: evaluate left arm dialysis fistula for arterial and  venous flows, eval for stenosis? - pt with LUE swelling, no DVT on  prev US, eval for issues with dialysis fistula.     COMPARISONS: CT chest, abdomen, and pelvis 2/7/2024.    REFERRING PROVIDER: DANIEL RIZVI    TECHNIQUE: Left brachial brachial dialysis graft evaluated with  grayscale, color Doppler, and spectral pulsed wave Doppler ultrasound.  Flow volumes obtained.    FINDINGS: LEFT:  Brachial artery, upper arm: 309/123 cm/s  Brachial artery, mid arm: 243/120 cm/s    Brachial artery, antecubital fossa, above anastomosis: 224/110 cm/s,  6.2 mm, 1959 cc/min Vol Flow    Brachial artery, distal to anastomosis: 172/54 cm/s, RETROGRADE    Radial artery, origin: 35/0 cm/s, antegrade  Radial artery, wrist: 70/0 cm/s, antegrade    Ulnar artery: origin: 34/0 cm/s, antegrade  Ulnar artery, wrist: 96/20 cm/s, arterial monophasic    Arterial anastomosis: 3.2 mm, 438/225, 1.96 velocity ratio    Graft, central to anastomosis: 423 cm/s    Graft, antecubital fossa: 330 cm/s, 5.2 mm, 1383 cc/min Vol Flow    Graft, mid arm: 146 cm/s    Graft, upper arm: 151 cm/s, 5.2 mm, 816 cc/min Vol Flow    Graft, peripheral to venous anastomosis: 177 cm/s    Venous anastomosis: 5.5 mm, 158 cm/s    Brachial vein, central to anastomosis: 425 cm/s, 2.69 velocity ratio    Axillary vein,  outflow vein: 198 cm/s    Paired veins in the axilla. Second vein, not arterialized. 10 cm/s,  phasic, partially compressible, linear filling defects.    Subclavian vein: 11.3 mm, 144 cm/s  2.8 mm, 535 cm/s  11.0 mm,  154 cm/s = 75% diameter stenosis, 4.69 velocity ratio, stenosis was  present in the previous CT.    Subclavian vein, medial: 147 cm/s    Innominate vein: 76 cm/s    Left internal jugular vein: 130 cm/s, fully compressible.      Impression    IMPRESSION: Left brachial-brachial graft.  1. No arterial inflow stenosis.    2. Steal physiology, correlate for symptoms. Brachial artery distal to  the anastomosis is retrograde in flow back to the anastomosis. Radial  and ulnar arteries are antegrade in flow at their origins.    3. Arterial anastomosis patent. 3.2 mm, 438/225, 1.96 velocity ratio.    4. Patent graft. Flow volumes exceed 600 mL/min.    5. Venous anastomosis patent. 5.5 mm, 158 cm/s.    6. Mid/lateral subclavian vein 75% diameter stenosis. Improving flow  may improve arm swelling but may also worsen steal physiology.    7. Non occlusive linear filling defects in the duplicated non  arterialized vein in the axilla, possible fibrin stranding.        KATHY BERNARD MD         SYSTEM ID:  [id number]   Glucose by meter   Result Value Ref Range    GLUCOSE BY METER POCT 121 (H) 70 - 99 mg/dL   Magnesium   Result Value Ref Range    Magnesium 2.0 1.7 - 2.3 mg/dL   Phosphorus   Result Value Ref Range    Phosphorus 2.7 2.5 - 4.5 mg/dL   Glucose by meter   Result Value Ref Range    GLUCOSE BY METER POCT 107 (H) 70 - 99 mg/dL   C. difficile Toxin B PCR with reflex to C. difficile Antigen and Toxins A/B EIA    Specimen: Per Rectum; Stool   Result Value Ref Range    C Difficile Toxin B by PCR Negative Negative    Narrative    The [employer] Xpert C. difficile Assay, performed on the [employer]  Instrument Systems, is a qualitative in vitro diagnostic test for rapid detection of toxin B gene sequences from  unformed (liquid or soft) stool specimens collected from patients suspected of having Clostridioides difficile infection (CDI). The test utilizes automated real-time polymerase chain reaction (PCR) to detect toxin gene sequences associated with toxin producing C. difficile. The Xpert C. difficile Assay is intended as an aid in the diagnosis of CDI.   Basic metabolic panel   Result Value Ref Range    Sodium 136 135 - 145 mmol/L    Potassium 3.5 3.4 - 5.3 mmol/L    Chloride 101 98 - 107 mmol/L    Carbon Dioxide (CO2) 24 22 - 29 mmol/L    Anion Gap 11 7 - 15 mmol/L    Urea Nitrogen 35.5 (H) 8.0 - 23.0 mg/dL    Creatinine 2.48 (H) 0.51 - 0.95 mg/dL    GFR Estimate 21 (L) >60 mL/min/1.73m2    Calcium 8.7 (L) 8.8 - 10.2 mg/dL    Glucose 120 (H) 70 - 99 mg/dL   Blood Culture Hand, Right    Specimen: Hand, Right; Blood   Result Value Ref Range    Culture No growth after 12 hours    Glucose by meter   Result Value Ref Range    GLUCOSE BY METER POCT 144 (H) 70 - 99 mg/dL   CBC with Platelets & Differential    Narrative    The following orders were created for panel order CBC with Platelets & Differential.  Procedure                               Abnormality         Status                     ---------                               -----------         ------                     CBC with platelets and d...[[id number]]  Abnormal            Final result               Manual Differential[[id number]]          Abnormal            Final result                 Please view results for these tests on the individual orders.   Magnesium   Result Value Ref Range    Magnesium 2.2 1.7 - 2.3 mg/dL   Phosphorus   Result Value Ref Range    Phosphorus 4.0 2.5 - 4.5 mg/dL   CRP inflammation   Result Value Ref Range    CRP Inflammation 7.57 (H) <5.00 mg/L   Blood gas venous   Result Value Ref Range    pH Venous 7.25 (L) 7.32 - 7.43    pCO2 Venous 58 (H) 40 - 50 mm Hg    pO2 Venous 69 (H) 25 - 47 mm Hg    Bicarbonate Venous 25 21 - 28 mmol/L    Base  Excess/Deficit Venous -2.2 -3.0 - 3.0 mmol/L    FIO2 21     Oxyhemoglobin Venous 89 (H) 70 - 75 %    O2 Sat, Venous 91.9 (H) 70.0 - 75.0 %    Narrative    In healthy individuals, oxyhemoglobin (O2Hb) and oxygen saturation (SO2) are approximately equal. In the presence of dyshemoglobins, oxyhemoglobin can be considerably lower than oxygen saturation.   Comprehensive metabolic panel   Result Value Ref Range    Sodium 134 (L) 135 - 145 mmol/L    Potassium 4.1 3.4 - 5.3 mmol/L    Carbon Dioxide (CO2) 22 22 - 29 mmol/L    Anion Gap 14 7 - 15 mmol/L    Urea Nitrogen 43.0 (H) 8.0 - 23.0 mg/dL    Creatinine 2.79 (H) 0.51 - 0.95 mg/dL    GFR Estimate 19 (L) >60 mL/min/1.73m2    Calcium 8.9 8.8 - 10.2 mg/dL    Chloride 98 98 - 107 mmol/L    Glucose 144 (H) 70 - 99 mg/dL    Alkaline Phosphatase 66 40 - 150 U/L    AST 25 0 - 45 U/L    ALT <5 0 - 50 U/L    Protein Total 5.3 (L) 6.4 - 8.3 g/dL    Albumin 3.0 (L) 3.5 - 5.2 g/dL    Bilirubin Total 0.2 <=1.2 mg/dL   CBC with platelets and differential   Result Value Ref Range    WBC Count 4.5 4.0 - 11.0 10e3/uL    RBC Count 2.35 (L) 3.80 - 5.20 10e6/uL    Hemoglobin 8.3 (L) 11.7 - 15.7 g/dL    Hematocrit 27.3 (L) 35.0 - 47.0 %     (H) 78 - 100 fL    MCH 35.3 (H) 26.5 - 33.0 pg    MCHC 30.4 (L) 31.5 - 36.5 g/dL    RDW 19.8 (H) 10.0 - 15.0 %    Platelet Count 202 150 - 450 10e3/uL    % Neutrophils      % Lymphocytes      % Monocytes      % Eosinophils      % Basophils      % Immature Granulocytes      NRBCs per 100 WBC 1 (H) <1 /100    Absolute Neutrophils      Absolute Lymphocytes      Absolute Monocytes      Absolute Eosinophils      Absolute Basophils      Absolute Immature Granulocytes      Absolute NRBCs 0.0 10e3/uL   Manual Differential   Result Value Ref Range    % Neutrophils 67 %    % Lymphocytes 18 %    % Monocytes 7 %    % Eosinophils 1 %    % Basophils 0 %    % Myelocytes 6 %    % Promyelocytes 1 %    Absolute Neutrophils 3.0 1.6 - 8.3 10e3/uL    Absolute  Lymphocytes 0.8 0.8 - 5.3 10e3/uL    Absolute Monocytes 0.3 0.0 - 1.3 10e3/uL    Absolute Eosinophils 0.0 0.0 - 0.7 10e3/uL    Absolute Basophils 0.0 0.0 - 0.2 10e3/uL    Absolute Myelocytes 0.3 (H) <=0.0 10e3/uL    Absolute Promyelocytes 0.0 <=0.0 10e3/uL    RBC Morphology Confirmed RBC Indices     Platelet Assessment  Automated Count Confirmed. Platelet morphology is normal.     Automated Count Confirmed. Platelet morphology is normal.     *Note: Due to a large number of results and/or encounters for the requested time period, some results have not been displayed. A complete set of results can be found in Results Review.

## 2024-02-22 NOTE — PLAN OF CARE
Goal Outcome Evaluation:      Plan of Care Reviewed With: patient    Overall Patient Progress: no changeOverall Patient Progress: no change    Outcome Evaluation: Assumed care at 0240. Patient is a transfer from , report received from Elin. Patient AO. VSS with exception of elevated BP (does not meet order parameters to notify MD). Order for bipap, patient refuses and is sat'ing WNL on RA. Generalized bruising present. GJ tube patent, TF running [facility] at 10ml/hr. Regular diet with sudha counts. C/o diarrhea (cdiff ruled out) and intermittent nausea. 1BM overnight. Bilateral foot swelling, feet elevated. Patient stated that feet are quite tender. Patient is SBA to [facility] [place]. Received tylenol prior to coming to unit. TPN infusing at 43ml/hr via right chest port. Right PIV infusing/TKO for IV abx. Plan for dialysis in the AM. No acute events overnight, continue with current POC      Does my patient have a central line? (PICC, CVC)  YES/NO: Yes: CVC   Does my patient still meet line necessity requirements? YES/NO: Yes: TPN/lipds  Was line necessity documentation completed in the flowsheet? YES/NO: Yes: see flowsheet  Was the provider notified to order CVC removal if applicable? YES/NO: No please provide rationale of reason for keeping central line.

## 2024-02-22 NOTE — PROVIDER NOTIFICATION
MD Jannte Isaac) okay with no blood sugars q4, wants it daily. MD states will discontinue insulin tomorrow as she is signed out but do not need to give overnight

## 2024-02-22 NOTE — PROGRESS NOTES
Calorie Count  Intake recorded for: 2/21  Total Kcals: 565 Total Protein: 24g  Kcals from Hospital Food: 565  Protein: 24g  Kcals from Outside Food (average):0 Protein: 0g  # Meals Ordered from Kitchen: 3 meals   # Meals Recorded: 2 meals (First - 75% breakfast sandwich w/ scrambled egg, turkey sausage & cheese on English muffin)       (Second - 100% tater tots, 50% deli ham sandwich w/ torres)   # Supplements Recorded: 0

## 2024-02-22 NOTE — PROGRESS NOTES
Medicine Progress note      [facility]  Medicine Service, [facility] TEAM 1    Date of Hospital Admission: 2/10/2024  Date of ICU Admission: 2/18/2024  Date of Transfer to Medicine Floor: 2/20/2024  Date of Service (when I saw the patient): 02/22/2024      Assessment & Plan  Sofie Rodriguez is a 63 year old female with PMH COPD s/p bilateral lung transplant 6/28/22 c/b hemidiaphragm palsy and recurrent pneumonias, gastroparesis and small bowel dysmotility complicated by severe malnutrition now s/p PEG/J, ESRD on T/Th/Sat HD, recent R femoral fx s/p ORIF, chronic diarrhea, recurrent c-diff, FTT with inability to tolerate any tube feeds, who was admitted to [facility] on 2/10/24 for concerns over malnutrition and TPN initiation via portacath. On 2/17/24 she was transferred to ICU for worsening mental status and acute hypoxic and hypercarbic respiratory failure inspite of BiPAP requiring intubation. She was suspected to have PNA and started on antibiotics. Extubated on 2/19 without complications, now on RA, tolerated iHD on 2/20, and tolerating PO regular diet without n/v from 2/20. Patient couldn't tolerate the oral load for CT enterography on 2/21. No ongoing concerns for SIBO on 2/22 as patient is passing multiple episodes of stools and gas with no abdominal pain or distention. USG of lt arm on 2/21 shows steel physiology and Vascular Surgery has only minor concerns for steal symptoms and given that the AVF is working well, they are okay with outpatient IR fistulograms/ venoplasty with wrist brachial index and PPG's. She is currently stable and underwent HD this morning.        Changes today:   - CT enterography with contrast- Pt not able to tolerate  - Regular diet + increased TPN + TF  per RD  - post-dialysis and refeeding labs tonight, if stable will transition to daily labs  -  Continue calorie counts, daily weights, I/Os  - Continue Zosyn (2/17 - 2/23 )   -  port culture per transplant ID rec  - C-diff test negative on 2/21  -  CT enterography- Patient couldn't tolerate the oral load. No ongoing concerns for SIBO, would need small bowel motility study if not improving outpatient through [facility], no further interventions per GI  -  Vascular surgery has only minor concerns for steal symptoms and given that the AVF is working well, they are okay with outpatient workup with wrist brachial index and PPG's, unless new concerns arise.        # Severe malnutrition   # FTT   # Gastroparesis, small bowel dysmotility  # S/P PEG/J with intolerance of enteral nutrition     Patient with gastroparesis (presumed due to vagal injury) and small bowel dysmotility complicated by unintentional 40lb weight loss over the past year and now severe malnutrition. Previously intolerant to oral food intake due to nausea. Was initially admitted for portacath and TPN initiation since 2/13. After extubation has been able to tolerate feeds without n/v from 2/20. Electrolytes trending towards baseline today.  Per GI, next steps for workup for malnutrition would include CT enterography to evaluate for anatomic abnormalities contributing to malnutrition vs possible treatment for SIBO (though patient has had multiple courses of treatment in the past with no long term improvement). Patient couldn't tolerate the oral load for CT enterography on 2/21, so will defer this until she is able to tolerate greater PO load. No ongoing concerns for SIBO on 2/22 as patient is passing multiple episodes of stools and gas with no abdominal pain or distention. On 2/22 , discussed with patient the need of small bowel motility study if not improving outpatient through [facility]. Also discussed with transplant pulm - given patient is tolerating >300 kcal of intake PO currently, will try stopping trickle feeds and continue with PO and TPN for nutrition.     - Regular diet + increased TPN +  trickle feeds TF  per RD               -  Nephrology: limit fluid < 1.5 L per day for TPN ( chart vol of TPN in the I/O)   - RD concerned pt is not absorbing any oral intake and they will be monitoring Bowel function, I/O and, PO/TF intake.   - will try stopping tube feeds for now with >300 kcal in PO intake, and monitor per discussion with transplant pulm, continue with TPN for majority of nutrition needs   - Continue calorie count  - Continue refeeding labs tomorrow  -  CT enterography- Patient couldn't tolerate the oral contrast load. No ongoing concerns for SIBO, would need small bowel motility study if not improving outpatient through [facility]  - Daily Weights      # Respiratory acidosis  # Acute hypoxic and hypercarbic respiratory failure  # S/P Lung transplant 2022  # Recurrent pneumonia     Patient received a bilateral lung transplant 6/28/22 for COPD. Was admitted 2/10 to address malnutrition   and admitted to ICU on 2/17 with hypoxic hypercarbic respiratory failure. Intubated on 2/18 AM  due to worsening oxygen requirements, likely due to pulmonary edema given hx of ESRD requiring HD vs infection given hx of lung transplant, immunosuppressed status, and recurrent pneumonias. Extubated on 2/19 without complications,   Micro Follow-up on BAL, viral panel, CMV, fungus, and Blood Cultures show no abnormalities. Currently on room air and has no new SOB, cough, chest pain, fever, chills. Didn't use any Bipap overnight as she tolerated RA.       - PRN hypertonic saline inhaler with albuterol nebs  - Continue good pulm toilet  - Transplant pulmonology following, recs appreciated  - PTA immunosuppressive agent  >Prednisone 5 mg every morning, 2.5 mg every afternoon; tacrolimus 4 mg every morning, 4mg every afternoon  > Monitor tacro levels, goal 7-9  - PTA dapsone MWF for PJP prophylaxis  - Continue zosyn for 7 days for empiric HAP course (2/17 - 2-23)  - Initial decompensation likely from opioids - limit medications that would depress respiration   -- port  culture pending per transplant ID rec      Antibiotics:    Vancomycin (2/17 - 2/17)  Zosyn (2/17 - )  Dapsone MWF, PJP ppx     Immunosuppression  Tacrolimus  Prednisone         # Steal physiology of LUE dialysis access fistula  LUE arterial US obtained 2/21 with concern for LUE edema. US of fistula demonstrated steal physiology. Discussed with nephrology, and vascular surgery consulted on 2/22. Vascular surgery has only minor concerns for steal symptoms and given that the AVF is working well, they are okay with outpatient fistulograms/ venoplasty with wrist brachial index and PPG's, unless new concerns arise.  - plan for outpatient workup as above; nephrology in agreement with outpatient workup      # Hypoalbuminemia  # Left upper extremity unilateral edema  # Bilateral pedal and ankle edema  Edema due to third spacing due to hypoalbuminemia vs HF. BNP >12561 at admission, Echo on 2/18 shows EF of 55-60% with IVC of <2.1 and collapsing >50% with sniff, normal RA pressure, no significant valvular abnormalities ;  Left upper extremity swelling and  pitting lower extremity edema likely due to hypoalbuminemia improved today. Upper limb duplex USG on 2/18 negative for DVT. Wt went from 43.4 kg on admission to 47.4 kg today. She is urinating but cannot chart as she usually urinates with BM. He reports trending to baseline with urination. She denies dysuria, retention symptoms. USG fistulogram on left arm on 2/21 shows steel physiology and Vascular Surgery consulted   - Vascular surgery has only minor concerns for steal symptoms and given that the AVF is working well, they are okay with outpatient fistulograms/ venoplasty with wrist brachial index and PPG's, unless new concerns arise.  - Continue daily weights  - Strict I/Os  - Elevation and wrapping of only lower legs; no wrapping of Left upper extremity with dialysis fistula        # ESRD on HD T/Th/Sat  # Acute on chronic Anemia 2/2 ESRD   # Hypervolemic hyponatremia  #  Anion gap metabolic acidosis - resolved  Patient is ESRD on T/Th/Sat HD, Hgb stabilizing after the transfusion. HD tolerated well today. Continuing monitoring Electrolytes daily. Will continue to monitor daily labs for refeeding syndrome as well.  - Continue Venofer injections    - CBP and CMP in the AM for Hb and elcetrolytes  - Continue epogen dose 8000 units as per nephrology  - Strict I/Os  - HD T/TH/Sat per nephrology      # Facial and neck bruising  # Pain  Reports soreness in her neck from lying in bed. No soreness in the buttocks/ back. Patient has multiple bruises over her arms and face which is attributed to previous falls. No new bruising reported today. Patient still complaints of occasional 5/10 headaches over the forehead since extubation. The headaches improve with tylenol. Rest and sleep makes it better. Using heating pads and lidocaine patch for body pains. Couple of small skin tears noted by the Rn's. Skin care done accordingly.  - Tylenol Q4  - Lidocaine patch prn  - Heating pads prn  -Diligent skin cares      # HTN  # A-fib, resolved  Noted atrial fibrillation on arrival with HR elevated at 150, not sustained for > 10 minutes and otherwise hemodynamically stable. Rates stable in the 70's.  - PTA metoprolol 25mg BID   - Continuous telemetry      # Encephalopathy secondary to hypercarbia - resolved    Patient was progressively more lethargic on 2/17 during admission in [facility]. Etiology likely secondary to hypercarbia in context of respiratory failure as patient was noted to have high CO2s concomitant with lethargy. Though receiving oxycodone and fentanyl, lethargy was only partially alleviated by narcan. LFTs and ammonia wnl with low suspicion of hepatic encephalopathy. BUN wnl with low suspicion for uremic encephalopathy. Head CT on 2/18 was negative with low suspicion of acute intracranial pathology. Patient is awake, alert, oriented and following commands as of 2/20.         # Right  hip fracture s/p ORIF (December 2023)   - PT/OT    # GERD  - PTA PPI    # Low T4  Patient with new low T4 at 0.64 and TSH at 6.5, concerning for new hypothyroidism vs sick thyroid syndrome. TSH with appropriate response, more consistent with elevation in the setting of acute illness, levothyroxine is not indicated at this time, will monitor for symptom development. Consider repeat testing in outpatient setting once patient no longer acutely ill.       # Hx EBV viremia   # Hypogammaglobulinemia  # Chronic immunosuppression  Patient has been afebrile and has not had leukocytosis however she is under immunosuppression. Given acute respiratory failure and hx of recurrent pneumonias, she was started on empiric abx for concerns over new pneumonia. RVP and cultures no growth to date. Will plan to treat empirically for HAP for 7 days.    - EBV ordered - 27K (decreased compared to prior)         Lines/tubes/drains:  - CVC RIJ (for TPN, is tunneled line)   - PIV x2  - PEG/J  - HD AV fistula, left arm        Diet: Adult Formula Drip Feeding: Continuous [employer] Peptide 1.5; Jejunostomy; Goal Rate: Begin at 10 mL/hr and hold; mL/hr; Do not advance tube feeding rate unless K+ is = or > 3.0, Mg++ is = or > 1.5, and Phos is = or > 1.9  Regular diet  Calorie Counts  parenteral nutrition - ADULT compounded formula        General Cares/Prophylaxis:    DVT Prophylaxis: Heparin SQ  GI Prophylaxis: PPI  Fluids: None  Code Status: Full    Clinically Significant Risk Factors        # Hypokalemia: Lowest K = 2.9 mmol/L in last 2 days, will replace as needed       # Hypoalbuminemia: Lowest albumin = 2.6 g/dL at 2/18/2024  5:13 AM, will monitor as appropriate             # Severe Malnutrition: based on nutrition assessment    # Financial/Environmental Concerns: none                Disposition Plan:      Expected Discharge Date: 2/26/24  Destination: home with help/services  Discharge Comments:           The patient's care was discussed  "with the Attending Physician, Dr. Jayne Escobar MD     DP, Medical Observer  Medicine Service, [facility] TEAM 1  [facility]  Securely message with [employer] (more info)  Text page via [facility] Paging/Directory   See signed in provider for up to date coverage information        Resident/Fellow Attestation   I, aJnnet Isaac MD, was present with the medical/EMILY student who participated in the service and in the documentation of the note.  I have verified the history and personally performed the physical exam and medical decision making.  I agree with the assessment and plan of care as documented in the note.  Note was edited for accuracy and clarity.    Jannet Isaac MD  PGY3  Date of Service (when I saw the patient): 02/22/24      _________________        Interval History  No acute events since yesterday. Pt reports improvement in cough frequency. Tolerating feeds since 2/20 without n/v. Continues to tolerate trickle tube feeds and TPN. Her left hand and arm swelling has been increasing this morning after starting dialysis and improving and less painful after it has been finished this afternoon. Patient complaints of occasional 5/10 non-radiating headaches over the forehead since extubation. The headaches improve with tylenol. Rest and sleep makes it better. Been stable over past couple of days. Overall, she feels better. Is still having diarrhea, multiple episodes per day 6-8 stools estimated. C diff testing was negative.      Physical Exam  /76 (BP Location: Right arm, Cuff Size: Adult Small)   Pulse 93   Temp 97.7  F (36.5  C) (Oral)   Resp 25   Ht 1.57 m (5' 1.81\")   Wt 49.1 kg (108 lb 3.9 oz)   SpO2 98%   BMI 19.92 kg/m         General: thin, alert and oriented  HEENT: Normocephalic, bruising on the right face around right orbit with hematoma and right neck.  Neuro: NAD, following commands, a&o  Pulm/Resp: clear lungs, no wheezing or crackles, able to clear " secretions, no cough during exam  CV: RRR, warm extremities, 3+ pitting edema in left hand and entirety of left forearm, no edema in right arm or hand, and 1+ pitting edema from bilateral ankles to feet  Abdomen: Non-distended, PEG in place without erythema or drainage  Incisions/Skin: Bruising to face and right forearm.     Labs and Imaging for this encounter reviewed in chart review.

## 2024-02-22 NOTE — PROGRESS NOTES
No new changes from previous note. VSS on RA. A+O x4. R PIV SL, L AV fistula. R CVC infusing TPN at 43. Renal diet. TF infusing at goal of 10 via PEG. Rayshawn counts day #1 today, ate well for dinner. Anuric. Loose Bms frequently, cdiff sample sent teja marques. Pain managed with tylenol. CT with contrast not done due to patient intolerance. No need to do blood sugars q4 per provider. Blood cultures done this shift. Lymphedema orders placed, to see tomorrow. Dialysis T/TH/S, will be tomorrow @ 0740, wants emla cream on fistula site 1 hour prior to going down.

## 2024-02-22 NOTE — PROGRESS NOTES
Admission/Transfer from: , report received from RIYA [place]thington  2 RN skin assessment completed. Yes Bita BAGLEY and Danita PACHECO  Significant findings include: neck bruising, arm bruising, G/J tube, AV fistula in left upper arm, port to right chest, scab on right outer shin, scrape on inner left thigh (groin), peeling skin, blanchable redness on coccyx covered with mepilex  [facility] Nurse Consult Ordered? No      Bita Thornton RN on 2/22/2024 at 3:46 AM

## 2024-02-22 NOTE — PROGRESS NOTES
Date: 2/22/2024  Time:1100     Data:  Pre Wt:   47.4 kg (estimated)  Desired Wt:   To be established  Post Wt:  43.9 kg (estimated)  Weight change:  3.5 kg  Ultrafiltration - Post Run Net Total Removed (mL):  3500 ml  Vascular Access Status: Fistula patent  Dialyzer Rinse:  Light  Total Blood Volume Processed: 75.8 L   Total Dialysis (Treatment) Time:   3 Hrs  Dialysate Bath: K 3, Ca 2.25  Heparin: Heparin 500 units loading + 500 units/hr     Lab:   No     Interventions:  Pt.dialyzed for 3 hours via  left AV Fistula using 15 gauge needles  UF set to 3.5 Liters, accommodating  priming and rinse back volumes  ,   No lab drawn  See administered per MAR  Decannulation done post HD, hemostasis is achieved in 10 minutes  Pressure dressing is applied, to be removed after 4-6 hours  Report given to PCN, sent back to her room in stable condition     Assessment:  A/O x 4, calm and cooperative, denies pain  Left arm AV Fistula has good thrill and bruit                Plan:    Per Renal team    Rebekah Packer BSN, RN  Acute Dialysis RN  [facility]

## 2024-02-22 NOTE — PROGRESS NOTES
Nephrology Progress Note  2024         Assessment & Recommendations:   Sofie Rodriguez is a 63 year old year old female with ESKD, COPD s/p b/l lung transplant in 2022 with multiple complications, gastroparesis s/p GJ tube, GIB, chronic diarrhea, recurrent c-diff, FTT with inability to tolerate any tube feeds, R hip fx s/p ORIF 2023, admitted on 2/10 for initiation of TPN/lipids, transferred to ICU on  with worsening mental status and respiratory acidosis in spite of bipap, ultimately intubated on , being treated for PNA, also with volume overload in setting of high volume TPN.     # ESKD - TTS, LUE AVG, 3hr, 45.5kg, [facility], Dr. Andres Fairbanks. She has been losing weight and now even below her recent set EDW.  - HD per TTS schedule  - pt would very much like to avoid extra runs or longer runs, so will attempt to manage volume with usual schedule, but will need to pull more fluid (tolerating 3.5 kg today)    # Dialysis access: LUE AVG placed 3-4 months ago, per pt. She had arm swelling and a fistulogram a couple months ago and swelling improved but now has redeveloped.  - US with c/f possible steal syndrome  - per vascular surgery, no concern for steal syndrome, ok to go ahead with fistulogram  - given that AVF is working well on dialysis (450 BFR) and at this time IR is only able to perform fistulograms when AVF isn't working well, will defer to OP for management.    # Hypercapnia: VB.25/58/69/25  # Hypoxic respiratory failure: intubated  in setting of hypercapnia in spite of bipap, extubated   - ongoing volume off  - on zosyn for possible PNA    # Volume/BP: Edema due to third spacing due to hypoalbuminemia. Anuric; on metoprolol soln 25 mg bid  - volume overload on CT chest scan and on exam with 2+ pitting edema   - wt went from 43.4 kg on admission to 47.1 kg with TPN initiation on   - Please wrap and elevate her legs  - please chart volume of TPN in the I/O and  "limit TPN to < 1.5 L per day    # Nutrition  # FTT  # Acute on chronic diarrhea  - per team, concern is that pt isn't absorbing much PO or tube feeds with diarrhea increasing significantly with increase in tube feeds; thus needing TPN     # Anemia 2/2 ESKD  On Venofer 50 qwk, Mircera last dose 1/9/2024  - hgb 7-8's  - Will continue Venofer 50 mcg q week (Tuesday)  - increase epogen dose from 4000 to 8000 units (starting 2/20)    # BMD : Ca 8's, phos 4.0, alb 3.0  - renvela is held  - on phos supplement        Recommendations were communicated to primary team via  note     RABIA Moctezuma   Division of Renal Disease and Hypertension  [phone]    Interval History :   Seen on dialysis, attempting 3.5 kg today. Pt very much wants to avoid extra HD runs or longer runs so will need to maximize UF as much as possible each run. Was seen by vascular surgery due to c/f steal syndrome on imaging, they are not concerned for steal and recommend fistulogram. AVF working well on dialysis so will likely defer to OP management. No current n/v, CP, SOB, chills    Review of Systems:   4 point ROS neg other than as noted above    Physical Exam:   I/O last 3 completed shifts:  In: 528.7 [P.O.:324.7; NG/GT:114]  Out: -    /77   Pulse 91   Temp 98  F (36.7  C) (Oral)   Resp 18   Ht 1.57 m (5' 1.81\")   Wt 47.4 kg (104 lb 9.6 oz)   SpO2 96%   BMI 19.25 kg/m       GENERAL APPEARANCE: alert  PULM: CTA  CV: regular rhythm, normal rate, no rub     -2+ pitting edema (LUE (fistula arm) > RUE)  GI: soft, non tender, no distended, bowel sounds are present  INTEGUMENT: no cyanosis, no rash  NEURO: a/o3  Access Left AVG     Labs:   All labs reviewed by me  Electrolytes/Renal -   Recent Labs   Lab Test 02/22/24  0601 02/21/24  2111 02/21/24  1739 02/21/24  1638 02/21/24  1544 02/21/24  1350 02/21/24  0770   *  --  136  --   --   --  133*  133*   POTASSIUM 4.1  --  3.5  --   --   --  3.4  3.5  3.5   CHLORIDE 98  --  101  -- "   --   --  97*  97*   CO2 22  --  24  --   --   --  25  25   BUN 43.0*  --  35.5*  --   --   --  24.1*  24.1*   CR 2.79*  --  2.48*  --   --   --  2.06*  2.06*   * 144* 120*   < >  --    < > 122*  122*   ESTUARDO 8.9  --  8.7*  --   --   --  8.4*  8.4*   MAG 2.2  --   --   --  2.0  --  2.0  1.9   PHOS 4.0  --   --   --  2.7  --  2.8  2.8    < > = values in this interval not displayed.       CBC -   Recent Labs   Lab Test 02/22/24 0601 02/21/24 0430 02/20/24 0448   WBC 4.5 3.4* 4.4   HGB 8.3* 7.8* 8.2*    149* 159       LFTs -   Recent Labs   Lab Test 02/22/24  0601 02/21/24 0430 02/20/24  0448   ALKPHOS 66 64 60   BILITOTAL 0.2 0.2 0.2   ALT <5 <5 <5   AST 25 15 17   PROTTOTAL 5.3* 4.9* 5.2*   ALBUMIN 3.0* 2.9* 3.0*       Iron Panel -   Recent Labs   Lab Test 09/26/22  0555 09/03/22  1039 08/24/22  0810   IRON 54 21* 41   IRONSAT 22 9* 21   CARLOS 769* 343* 334*         Imaging:  All imaging studies reviewed by me.     Current Medications:   acetaminophen  650 mg Oral or Feeding Tube Q6H    calcium carbonate-vitamin D  1 tablet Per J Tube TID w/meals    cyanocobalamin  500 mcg Per Feeding Tube Daily    dapsone  50 mg Per J Tube Q Mon Wed Fri AM    epoetin tre-epbx  8,000 Units Intravenous Once in dialysis/CRRT    heparin ANTICOAGULANT  5,000 Units Subcutaneous Q12H    lidocaine  1 patch Transdermal Q24h    lipids plant base  250 mL Intravenous Once per day on Monday Wednesday Friday    metoprolol  25 mg Per J Tube BID    pantoprazole  40 mg Per J Tube BID    piperacillin-tazobactam  2.25 g Intravenous Q6H    potassium & sodium phosphates  1 packet Oral or Feeding Tube BID    predniSONE  5 mg Per J Tube QAM    And    predniSONE  2.5 mg Per J Tube QPM    [Held by provider] sevelamer carbonate (RENVELA)  0.8 g Oral BID    tacrolimus  4 mg Per J Tube QAM    And    tacrolimus  4.5 mg Per J Tube QPM      dextrose      dextrose      heparin (porcine) 500 Units/hr (02/22/24 7383)    parenteral nutrition -  ADULT compounded formula 43 mL/hr at 02/21/24 2036    sodium chloride 0.9%      - MEDICATION INSTRUCTIONS -       Pema Haider PA

## 2024-02-22 NOTE — PLAN OF CARE
Goal Outcome Evaluation:      Plan of Care Reviewed With: other (see comments)    Overall Patient Progress: no changeOverall Patient Progress: no change    Outcome Evaluation: see RD note 2/22    Nimco Lion MS, RD, LD, [facility]    6C (beds 4128-8072) + 7C (beds 3466-2622) + ED   RD pager: [phone]  Weekend/Holiday RD pager: [phone]

## 2024-02-22 NOTE — CONSULTS
VASCULAR SURGERY INPATIENT CONSULTATION / Initial In-Patient visit    VASCULAR SURGEON: Dr. 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 On 2/17/24 she was transferred to ICU for worsening mental status and acute hypoxic and hypercarbic respiratory failure inspite of BiPAP requiring intubation. She was suspected to have PNA and started on antibiotics. Extubated on 2/19 without complications, now on RA, tolerated iHD on 2/20. Vascular surgery is now engaged for concern of left upper extremity swelling. [facility] with improved swelling however this has reoccurred. Arterial duplex of left upper extremity from 2/21/2024 notes concern for steal physiology however patient's exam without clear steal symptoms. We stopped the HD and pulse exam did not demonstrate augmentation. There is no Doppler quality change with HD line clamping/compression.  Patient has palpable radial pulse during HD run, with excellent Doppler palmar arch.     -Minor concerns for steal symptoms, especially given above exam. Patient has not had any difficulty with HD runs. From a vascular surgery perspective, okay with venoplasty with IR.  -Recommend  ultrasound of the left upper extremity with wrist brachial index and PPG's.  -Vascular surgery will follow peripherally.  Please do not hesitate to contact us should new concerns or clinical exam change.    Discussed pt history, exam, assessment and formulated plan with Dr. Sha Faustin of the vascular surgery service, who is in agreement with the above.    Thank you for involving vascular surgery in the care of Sofie Rodriguez. Should any questions or concerns arise, please don't hesitate to contact us.    Rosi Jama, CNP  Vascular Surgery  Pager: [phone]  I, Sha Faustin MD, personally saw and examined the patient today, reviewed and contributed to the history, and formulated the above decisions.  [email or web address]  Send message or 10 digit call back number Securely via [employer] with the Vocera Web Console (learn more here)      HPI:  Sofie Rodriguez is a 63 year old female who was seen today in consultation for left upper extremity swelling, concern for steal symptoms. Patient has had left upper extremity fistulogram at [facility] on 12/4/2023: Venous anastomotic and central venous stenosis, treated with balloon angioplasty resulting in somewhat improved swelling.  Although swelling of left upper extremity has recurred, there are no barriers HD rounds, AV fistula continues to work well. Patient has left upper extremity pain throughout the arm, especially axillary pain with light palpation. Diffuse swelling is present from shoulder to digits, with palpable radial and excellent Doppler palmar arch. Per nephrology, aVF continues to work well with dialysis, thus we will defer this for outpatient management.    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) 08/17/2022    Hepatitis 2017    Hep C,  [facility]    History of blood transfusion     HTN (hypertension)     Infectious mononucleosis     Lung infection 11/30/2022    Osteopenia          Past Surgical History:   Procedure Laterality Date    BRONCHOSCOPY (RIGID OR FLEXIBLE), DIAGNOSTIC N/A 08/02/2022    Procedure: BRONCHOSCOPY, DIAGNOSTIC- inspection Bronch;  Surgeon: Kamala Lovell MD;  [facility]    BRONCHOSCOPY (RIGID OR FLEXIBLE), DIAGNOSTIC N/A 09/13/2022    Procedure: INSPECTION BRONCHOSCOPY, WITH BRONCHOALVEOLAR LAVAGE;  Surgeon: Jose R Mccullough MD;  [facility]    BRONCHOSCOPY (RIGID OR FLEXIBLE), DIAGNOSTIC N/A 11/09/2022    Procedure: BRONCHOSCOPY, WITH BRONCHOALVEOLAR LAVAGE AND BIOPSY;  Surgeon: Cesar Lima MD;  Location:  GI    BRONCHOSCOPY (RIGID OR FLEXIBLE), DIAGNOSTIC N/A 01/25/2023    Procedure: BRONCHOSCOPY, WITH BRONCHOALVEOLAR LAVAGE AND BIOPSY;  Surgeon: Mason Reddy MD;  Location:  GI    BRONCHOSCOPY (RIGID OR FLEXIBLE), DIAGNOSTIC N/A 04/19/2023    Procedure: BRONCHOSCOPY, WITH BRONCHOALVEOLAR LAVAGE AND BIOPSY;  Surgeon: Kamala Lovell MD;  Location:  GI    BRONCHOSCOPY (RIGID OR FLEXIBLE), DIAGNOSTIC N/A 07/12/2023    Procedure: BRONCHOSCOPY, WITH BRONCHOALVEOLAR LAVAGE AND BIOPSY;  Surgeon: Cesar Lima MD;  Location:  GI    BRONCHOSCOPY FLEXIBLE AND RIGID N/A 07/19/2022    Procedure: BRONCHOSCOPY inspection only;  Surgeon: Bob Liao MD;  Location:  GI    COLONOSCOPY  2015    CORONARY ANGIOGRAPHY ADULT ORDER      CV CORONARY ANGIOGRAM N/A 06/30/2021    Procedure: CV CORONARY ANGIOGRAM;  Surgeon: Alexander Cuellar MD;  Location:  HEART CARDIAC CATH LAB    CV RIGHT HEART CATH MEASUREMENTS RECORDED N/A 06/30/2021    Procedure: CV RIGHT HEART CATH;  Surgeon: Alexander Cuellar MD;  Location:  HEART CARDIAC CATH LAB    ENDOSCOPIC PERORAL MYOTOMY N/A 10/09/2023    Procedure: MYOTOMY, ESOPHAGUS, ENDOSCOPIC, ORAL APPROACH;  Surgeon: Gonzalez Navarro MD;  Location:  OR     ENDOSCOPIC RETROGRADE CHOLANGIOPANCREATOGRAM N/A 08/11/2022    Procedure: ENDOSCOPIC RETROGRADE CHOLANGIOPANCREATOGRAPHY WITH PANCREATIC DUCT NEEDLE KNIFE AND STENT PLACEMENT, BILE DUCT SPHINCTEROTOMY, BLOOD/DEBRIS REMOVAL AND STENT PLACEMENT;  Surgeon: Cosmo Arroyo MD;  [facility]    ENDOSCOPIC RETROGRADE CHOLANGIOPANCREATOGRAM N/A 10/07/2022    Procedure: ENDOSCOPIC RETROGRADE CHOLANGIOPANCREATOGRAPHY with biliary and pancreatic stent removal, debris removal;  Surgeon: Cosmo Arroyo MD;  [facility]    ENT SURGERY  1974    tonsillectomy    ENTEROSCOPY SMALL BOWEL N/A 08/11/2022    Procedure: SMALL BOWEL ENTEROSCOPY;  Surgeon: Cosmo Arroyo MD;  [facility]    ESOPHAGOGASTRODUODENOSCOPY, WITH NASOGASTRIC TUBE INSERTION N/A 07/01/2022    Procedure: ESOPHAGOGASTRODUODENOSCOPY, WITH NASOJEJUNAL TUBE INSERTION;  Surgeon: Ozzy Nickerson MD;  Location:  GI    ESOPHAGOSCOPY, GASTROSCOPY, DUODENOSCOPY (EGD), COMBINED N/A 08/03/2022    Procedure: ESOPHAGOGASTRODUODENOSCOPY (EGD);  Surgeon: Ira Andres MD;  Location:  GI    ESOPHAGOSCOPY, GASTROSCOPY, DUODENOSCOPY (EGD), COMBINED N/A 01/25/2023    Procedure: ESOPHAGOGASTRODUODENOSCOPY (EGD) with botox injection;  Surgeon: Gonzalez Navarro MD;  Location:  GI    ESOPHAGOSCOPY, GASTROSCOPY, DUODENOSCOPY (EGD), COMBINED N/A 10/09/2023    Procedure: ESOPHAGOGASTRODUODENOSCOPY;  Surgeon: Gonzalez Navarro MD;  [facility]    HAND SURGERY      INSERT CHEST TUBE Right 09/13/2022    Procedure: Insert chest tube;  Surgeon: Jose R Mccullough MD;  [facility]    IR CVC TUNNEL PLACEMENT > 5 YRS OF AGE  09/26/2022    IR GASTRO JEJUNOSTOMY TUBE CHANGE  08/31/2022    IR GASTRO JEJUNOSTOMY TUBE CHANGE  12/21/2022    IR GASTRO JEJUNOSTOMY TUBE CHANGE  07/12/2023    IR GASTRO JEJUNOSTOMY TUBE CHANGE  08/18/2023    IR GASTRO JEJUNOSTOMY TUBE CHANGE  11/14/2023    IR GASTRO JEJUNOSTOMY TUBE PLACEMENT  07/27/2022    IR  THORACENTESIS  08/29/2022    LEEP TX, CERVICAL  04/07/2017    HECTOR III    LYMPH NODE BIOPSY Left 2005    Left axilla, benign- [place]    MIDLINE INSERTION - DOUBLE LUMEN Left 07/28/2022    20cm, Basilic vein    REPLACE GASTROJEJUNOSTOMY TUBE, PERCUTANEOUS  10/07/2022    Procedure: Replace Gastrojejunostomy Tube;  Surgeon: Cosmo Arroyo MD;  [facility]    THORACENTESIS Left 08/29/2022    Procedure: THORACENTESIS;  Surgeon: Bo Capone PA-C;  Location: [facility] OR    THORACENTESIS Left 09/13/2022    Procedure: Thoracentesis;  Surgeon: Jose R Mccullough MD;  [facility]    THROMBECTOMY UPPER EXTREMITY Left 07/02/2022    Procedure: LEFT RADIAL ARM THROMBECTOMY;  Surgeon: Christie Graham MD;  [facility]    TRANSPLANT LUNG RECIPIENT SINGLE X2 Bilateral 06/28/2022    Procedure: Clamshell Incision, Bilateral Sequential Lung Transplant, On Cardiopulmonary Bypass, Flexible Bronchoscopy;  Surgeon: Sue Sunshine MD;  [facility]        ALLERGIES:     Allergies   Allergen Reactions    Blood Transfusion Related (Informational Only) Other (See Comments)     Patient has a history of a clinically significant antibody against RBC antigens.  A delay in compatible RBCs may occur.     No Clinical Screening - See Comments Other (See Comments)     Patient has a history of a clinically significant antibody against RBC antigens.  A delay in compatible RBCs may occur.    Azithromycin Rash     12/1/22: Developed a rash that is not raised and looks diffuse in nature. It started in the groin and up the back and has now worked its way up her chest into her face. Pt states that it has now started to itch. She is breathing and talking normally and denies any airway changes. Unclear what started rash. Pt noted feeling somewhat itchy yesterday.    Ganciclovir Rash     12/1/22: Developed a rash that is not raised and looks diffuse in nature. It started in the groin and up the back and has now worked its  way up her chest into her face. Pt states that it has now started to itch. She is breathing and talking normally and denies any airway changes. Unclear what started rash. Pt noted feeling somewhat itchy yesterday.        MEDS:    Current Facility-Administered Medications:     acetaminophen (TYLENOL) tablet 650 mg, 650 mg, Oral or Feeding Tube, Q6H, Fan Lee MD, 650 mg at 02/22/24 0628    albumin human 5 % injection  mL,  mL, Intravenous, Q1H PRN, Claribel Horn MD    albuterol (PROVENTIL) neb solution 2.5 mg, 2.5 mg, Nebulization, Q2H PRN, Fan Lee MD    calcium carbonate suspension 1,250 mg, 1,250 mg, Per J Tube, 4x Daily PRN, Milton Herrera DO    calcium carbonate-vitamin D (CALTRATE) 600-10 MG-MCG per tablet 1 tablet, 1 tablet, Per J Tube, TID w/meals, Natanael Worthington MD, 1 tablet at 02/21/24 1837    cyanocobalamin (VITAMIN B-12) tablet 500 mcg, 500 mcg, Per Feeding Tube, Daily, Milton Herrera DO, 500 mcg at 02/21/24 0812    dapsone suspension 50 mg, 50 mg, Per J Tube, Q Mon Wed Fri AM, Milton Herrera DO, 50 mg at 02/21/24 0811    dextrose 10% infusion, , Intravenous, Continuous PRN, Miranda Braswell MD    dextrose 10% infusion, , Intravenous, Continuous PRN, Pablito Rod MD    glucose gel 15-30 g, 15-30 g, Oral, Q15 Min PRN **OR** dextrose 50 % injection 25-50 mL, 25-50 mL, Intravenous, Q15 Min PRN, 25 mL at 02/18/24 0028 **OR** glucagon injection 1 mg, 1 mg, Subcutaneous, Q15 Min PRN, Liliane Cheung APRN CNP    epoetin tre-epbx (RETACRIT) injection 8,000 Units, 8,000 Units, Intravenous, Once in dialysis/CRRT, Jayne Escobar MD    heparin 10,000 units/10 mL infusion (DIALYSIS USE), 500 Units/hr, Hemodialysis Machine, Continuous, Malik Burger MD    heparin ANTICOAGULANT injection 5,000 Units, 5,000 Units, Subcutaneous, Q12H, MoschJesús MD, 5,000 Units at 02/22/24 0100    Lidocaine (LIDOCARE) 4 % Patch 1 patch, 1 patch, Transdermal, Q24h, Fan Lee MD, 1  patch at 02/21/24 2046    lidocaine 1 % 0.1-1 mL, 0.1-1 mL, Other, Q1H PRN, Milton Herrera DO    lidocaine 1 % 0.5 mL, 0.5 mL, Intradermal, Once PRN, Jayne Escobar MD    lidocaine 1 % 0.5 mL, 0.5 mL, Intradermal, Once PRN, Jayne Escobar MD    lidocaine-prilocaine (EMLA) cream, , Topical, Q2H PRN, Ravin Johnston MD, Given at 02/22/24 0628    lipids plant base (CLINOLIPID) 20 % infusion 250 mL, 250 mL, Intravenous, Once per day on Monday Wednesday Friday, Pablito Rod MD, Last Rate: 20.8 mL/hr at 02/21/24 2036, 250 mL at 02/21/24 2036    loperamide (IMODIUM) capsule 2 mg, 2 mg, Per J Tube, 4x Daily PRN, Malik Burger MD, 2 mg at 02/22/24 0657    metoprolol (FIRST-METOPROLOL) solution 25 mg, 25 mg, Per J Tube, BID, Jannet Isaac MD, 25 mg at 02/21/24 2046    naloxone (NARCAN) injection 0.2 mg, 0.2 mg, Intravenous, Q2 Min PRN, 0.2 mg at 02/17/24 1453 **OR** naloxone (NARCAN) injection 0.4 mg, 0.4 mg, Intravenous, Q2 Min PRN **OR** naloxone (NARCAN) injection 0.2 mg, 0.2 mg, Intramuscular, Q2 Min PRN **OR** naloxone (NARCAN) injection 0.4 mg, 0.4 mg, Intramuscular, Q2 Min PRN, Pablito Rod MD    No heparin via hemodialysis machine, , Does not apply, Once, Jayne Escobar MD    ondansetron (ZOFRAN ODT) ODT tab 4 mg, 4 mg, Oral, Q6H PRN, 4 mg at 02/21/24 1856 **OR** ondansetron (ZOFRAN) injection 4 mg, 4 mg, Intravenous, Q6H PRN, Milton Herrera DO, 4 mg at 02/20/24 0130    pantoprazole (PROTONIX) 2 mg/mL suspension 40 mg, 40 mg, Per J Tube, BID, Milton Herrera DO, 40 mg at 02/21/24 2046    parenteral nutrition - ADULT compounded formula, , CENTRAL LINE IV, TPN CONTINUOUS, Jayne Escobar MD, Last Rate: 43 mL/hr at 02/21/24 2036, New Bag at 02/21/24 2036    piperacillin-tazobactam (ZOSYN) 2.25 g vial to attach to  ml bag, 2.25 g, Intravenous, Q6H, Fan Lee MD, Last Rate: 200 mL/hr at 02/20/24 2200, 2.25 g at 02/22/24 0641    potassium & sodium phosphates (NEUTRA-PHOS) Packet 1 packet, 1  packet, Oral or Feeding Tube, BID, Jannet Isaac MD, 1 packet at 02/21/24 2046    predniSONE (DELTASONE) tablet 5 mg, 5 mg, Per J Tube, QAM, 5 mg at 02/21/24 0812 **AND** predniSONE (DELTASONE) tablet 2.5 mg, 2.5 mg, Per J Tube, QPM, Pablito Rod MD, 2.5 mg at 02/21/24 2046    senna-docusate (SENOKOT-S/PERICOLACE) 8.6-50 MG per tablet 1 tablet, 1 tablet, Oral, BID PRN **OR** senna-docusate (SENOKOT-S/PERICOLACE) 8.6-50 MG per tablet 2 tablet, 2 tablet, Oral, BID PRN, Milton Herrera DO    [Held by provider] sevelamer carbonate (RENVELA) Packet 0.8 g, 0.8 g, Oral, BID, Ravin Johnston MD, 0.8 g at 02/17/24 0843    sodium chloride (NEBUSAL) 3 % neb solution 3 mL, 3 mL, Nebulization, Q3H PRN, Fan Lee MD    sodium chloride (PF) 0.9% PF flush 3 mL, 3 mL, Intracatheter, q1 min prn, Milton Herrera DO, 20 mL at 02/17/24 1643    sodium chloride 0.9 % bag TABLE SOLN, 1 Bag, TABLE SOLN, Continuous PRN, Lynnette Kelly PA-C    sodium chloride 0.9% BOLUS 100-150 mL, 100-150 mL, Intravenous, Q15 Min PRN, Jayne Escobar MD    sodium chloride 0.9% BOLUS 250 mL, 250 mL, Intravenous, Once in dialysis/CRRT, Jayne Escobar MD    sodium chloride 0.9% BOLUS 300 mL, 300 mL, Hemodialysis Machine, Once, Jayne Escobar MD    Stop Heparin 60 minutes before end of treatment, , Does not apply, Continuous PRN, Jayne Escobar MD    tacrolimus (GENERIC) suspension 4 mg, 4 mg, Per J Tube, QAM, 4 mg at 02/21/24 0811 **AND** tacrolimus (GENERIC) suspension 4.5 mg, 4.5 mg, Per J Tube, QPM, Pablito Rod MD, 4.5 mg at 02/21/24 5893     SOCIAL HABITS:    Tobacco Use      Smoking status: Former        Packs/day: 0.50        Years: 30.00        Additional pack years: 0.00        Total pack years: 15.00        Types: Cigarettes        Quit date: 11/11/2020        Years since quitting: 3.2      Smokeless tobacco: Never     Social History    Substance and Sexual Activity      Alcohol use: Not Currently        Comment: Stopped drinking  "in 2020       History   Drug Use Unknown     Comment: hx:marijuana and methamphetamine-quit both unsure ?  2-3 yrs ago        FAMILY HISTORY:  No family history on file.    REVIEW OF SYSTEMS:    A 12 point ROS was reviewed and except for what is listed in the HPI above, all others are negative    PE:    Vital signs:  Temp: 98  F (36.7  C) Temp src: Oral BP: 130/73 Pulse: 88   Resp: 12 SpO2: 95 % O2 Device: None (Room air) Oxygen Delivery: 1 LPM Height: 157 cm (5' 1.81\") Weight: 47.4 kg (104 lb 9.6 oz)  Estimated body mass index is 19.25 kg/m  as calculated from the following:    Height as of this encounter: 1.57 m (5' 1.81\").    Weight as of this encounter: 47.4 kg (104 lb 9.6 oz).       Wt Readings from Last 1 Encounters:   02/20/24 47.4 kg (104 lb 9.6 oz)     Body mass index is 19.25 kg/m .    EXAM:  GENERAL: This is a well-developed 63 year old female who appears her stated age  CARDIAC:   Regular by radial pulse  CHEST/LUNG:  Clear lung fields bilaterally  GASTROINTESINAL (ABDOMEN):Soft, non-tender, no pulsatile mass   MUSCULOSKELETAL: Left upper extremity with swelling  NEUROLOGIC: Focally intact, Alert and oriented x 3.   PSYCH: appropriate affect  INTEGUMENT: Multiple bruising on bilateral upper extremities  VASCULAR: Left upper extremity with palpable radial pulse. Excellent multiphasic Doppler palmar arch.  Motor and sensory function intact.    DIAGNOSTIC STUDIES:     Images:  US Ext Arterial Venous Dialys Acs Graft    Result Date: 2/21/2024  ULTRASOUND EXTREMITY ARTERIAL VENOUS DIALYSIS ACCESS GRAFT 2/21/2024 10:50 AM CLINICAL HISTORY: evaluate left arm dialysis fistula for arterial and venous flows, eval for stenosis? - pt with LUE swelling, no DVT on prev US, eval for issues with dialysis fistula. COMPARISONS: CT chest, abdomen, and pelvis 2/7/2024. REFERRING PROVIDER: DANIEL RIZVI TECHNIQUE: Left brachial brachial dialysis graft evaluated with grayscale, color Doppler, and spectral pulsed wave " Doppler ultrasound. Flow volumes obtained. FINDINGS: LEFT: Brachial artery, upper arm: 309/123 cm/s Brachial artery, mid arm: 243/120 cm/s Brachial artery, antecubital fossa, above anastomosis: 224/110 cm/s, 6.2 mm, 1959 cc/min Vol Flow Brachial artery, distal to anastomosis: 172/54 cm/s, RETROGRADE Radial artery, origin: 35/0 cm/s, antegrade Radial artery, wrist: 70/0 cm/s, antegrade Ulnar artery: origin: 34/0 cm/s, antegrade Ulnar artery, wrist: 96/20 cm/s, arterial monophasic Arterial anastomosis: 3.2 mm, 438/225, 1.96 velocity ratio Graft, central to anastomosis: 423 cm/s Graft, antecubital fossa: 330 cm/s, 5.2 mm, 1383 cc/min Vol Flow Graft, mid arm: 146 cm/s Graft, upper arm: 151 cm/s, 5.2 mm, 816 cc/min Vol Flow Graft, peripheral to venous anastomosis: 177 cm/s Venous anastomosis: 5.5 mm, 158 cm/s Brachial vein, central to anastomosis: 425 cm/s, 2.69 velocity ratio Axillary vein, outflow vein: 198 cm/s Paired veins in the axilla. Second vein, not arterialized. 10 cm/s, phasic, partially compressible, linear filling defects. Subclavian vein: 11.3 mm, 144 cm/s  2.8 mm, 535 cm/s  11.0 mm, 154 cm/s = 75% diameter stenosis, 4.69 velocity ratio, stenosis was present in the previous CT. Subclavian vein, medial: 147 cm/s Innominate vein: 76 cm/s Left internal jugular vein: 130 cm/s, fully compressible.     IMPRESSION: Left brachial-brachial graft. 1. No arterial inflow stenosis. 2. Steal physiology, correlate for symptoms. Brachial artery distal to the anastomosis is retrograde in flow back to the anastomosis. Radial and ulnar arteries are antegrade in flow at their origins. 3. Arterial anastomosis patent. 3.2 mm, 438/225, 1.96 velocity ratio. 4. Patent graft. Flow volumes exceed 600 mL/min. 5. Venous anastomosis patent. 5.5 mm, 158 cm/s. 6. Mid/lateral subclavian vein 75% diameter stenosis. Improving flow may improve arm swelling but may also worsen steal physiology. 7. Non occlusive linear filling defects in the  duplicated non arterialized vein in the axilla, possible fibrin stranding. KATHY BERNARD MD   SYSTEM ID:  [id number]      US Upper Extremity Venous Duplex Left    Result Date: 2/18/2024  EXAMINATION: US UPPER EXTREMITY VENOUS DUPLEX LEFT  2/18/2024 10:01 AM  CLINICAL HISTORY: unilateral swelling, rule out DVT COMPARISON: Ultrasound 7/8/2022    PROCEDURE COMMENTS: Ultrasound was performed of the deep venous system of the left upper extremity using grayscale, color, and spectral Doppler. FINDINGS: The internal jugular, brachiocephalic, subclavian, brachial, basilic and cephalic veins are visualized and are patent. Venous waveforms are normal with arterial type waveforms noted in the visualized hemodialysis fistula. There is normal response to compression. Edema noted in the superficial subcutaneous soft tissues.     IMPRESSION: No deep venous thrombosis in the left upper extremity. I have personally reviewed the examination and initial interpretation and I agree with the findings. ISIDRO NOVAK MD   SYSTEM ID:  [id number]    LABS:      Sodium   Date Value Ref Range Status   02/22/2024 134 (L) 135 - 145 mmol/L Final     Comment:     Reference intervals for this test were updated on 09/26/2023 to more accurately reflect our healthy population. There may be differences in the flagging of prior results with similar values performed with this method. Interpretation of those prior results can be made in the context of the updated reference intervals.    02/21/2024 136 135 - 145 mmol/L Final     Comment:     Reference intervals for this test were updated on 09/26/2023 to more accurately reflect our healthy population. There may be differences in the flagging of prior results with similar values performed with this method. Interpretation of those prior results can be made in the context of the updated reference intervals.    02/21/2024 133 (L) 135 - 145 mmol/L Final     Comment:     Reference intervals for this test were updated  on 09/26/2023 to more accurately reflect our healthy population. There may be differences in the flagging of prior results with similar values performed with this method. Interpretation of those prior results can be made in the context of the updated reference intervals.    02/21/2024 133 (L) 135 - 145 mmol/L Final     Comment:     Reference intervals for this test were updated on 09/26/2023 to more accurately reflect our healthy population. There may be differences in the flagging of prior results with similar values performed with this method. Interpretation of those prior results can be made in the context of the updated reference intervals.    06/30/2021 138 133 - 144 mmol/L Final   06/14/2021 143 133 - 144 mmol/L Final     Urea Nitrogen   Date Value Ref Range Status   02/22/2024 43.0 (H) 8.0 - 23.0 mg/dL Final   02/21/2024 35.5 (H) 8.0 - 23.0 mg/dL Final   02/21/2024 24.1 (H) 8.0 - 23.0 mg/dL Final   02/21/2024 24.1 (H) 8.0 - 23.0 mg/dL Final   08/29/2022 54 (H) 7 - 30 mg/dL Final   08/24/2022 56 (H) 7 - 30 mg/dL Final   08/18/2022 74 (H) 7 - 30 mg/dL Final   06/30/2021 30 7 - 30 mg/dL Final   06/14/2021 19 7 - 30 mg/dL Final     Hemoglobin   Date Value Ref Range Status   02/22/2024 8.3 (L) 11.7 - 15.7 g/dL Final   02/21/2024 7.8 (L) 11.7 - 15.7 g/dL Final   02/20/2024 8.2 (L) 11.7 - 15.7 g/dL Final   06/30/2021 11.8 11.7 - 15.7 g/dL Final     Comment:     PULMONARY ARTERY   06/30/2021 12.9 11.7 - 15.7 g/dL Final   06/14/2021 12.5 11.7 - 15.7 g/dL Final     Platelet Count   Date Value Ref Range Status   02/22/2024 202 150 - 450 10e3/uL Final   02/21/2024 149 (L) 150 - 450 10e3/uL Final   02/20/2024 159 150 - 450 10e3/uL Final   06/30/2021 284 150 - 450 10e9/L Final   06/14/2021 215 150 - 450 10e9/L Final     INR   Date Value Ref Range Status   02/19/2024 1.03 0.85 - 1.15 Final   02/17/2024 0.90 0.85 - 1.15 Final   02/13/2024 0.92 0.85 - 1.15 Final   06/14/2021 0.93 0.86 - 1.14 Final   01/11/2020 1.0 0.9 - 1.1  Final     INR (External)   Date Value Ref Range Status   05/12/2023 0.9 0.9 - 1.1 INR Final   04/18/2023 0.9 0.9 - 1.1 INR Final

## 2024-02-22 NOTE — PROGRESS NOTES
CLINICAL NUTRITION SERVICES - REASSESSMENT NOTE     Nutrition Prescription    RECOMMENDATIONS FOR MDs/PROVIDERS TO ORDER:   As medically appropriate:    Consider advancing TF as tolerated towards goal rate of 45 ml/hr.  This would provide 1661 kcal (39 kcal/kg), 80 g protein (1.9 g/kg), 149 g CHO, 16 g fiber, 83 g fat (40% from MCTs), and 756 mL free water daily      Continue regular diet    Malnutrition Status:    Severe malnutrition in the context of chronic illness/disease    Recommendations already ordered by Registered Dietitian (RD):  Continue calorie counts (2/21-2/23)  Goal PN to provide 1032 ml, 250 g dextrose, 60 g AA, and 250 mL 20% lipids three days per week for total provision of 1302 Kcals (30 Kcals/kg), 1.4 g/kg protein, GIR 4 mg/kg/minute, and 16% fat kcals on average daily.      Rewind Me Peptide 1.5 @ 10 mL/hr (240 mL/day) to provide 360 kcal (8.3 kcal/kg), 18 g protein (0.4 g/kg), 33 g CHO, 3.6 g fiber, 18 g fat (40% from MCTs), and 168 mL free water daily      Total provisions: 1662 kcals/day 38 kcals/kg, 79 grams protein 1.8 gm/kg  Future/Additional Recommendations:  Monitor bowel function/signs of malabsorption  Monitor I/O's, wts, meds, labs  Monitor TF/TPN/PO tolerance and adequacy     EVALUATION OF THE PROGRESS TOWARD GOALS   Diet: Regular    Intake/Tolerance:  TF + oral intake + TPN  TF: 175ml on average over last 4 days  Current TPN: 95 grams, AA to 52 grams.  Updated TPN regimen will provide 1085 kcal (25 kcal/kg), 1.2g/kg protein, 250 mL 20% lipids three days per week   Intake recorded for:   2/20       Total Kcals: 1158     Total Protein: 40g   2/21       Total Kcals: 565       Total Protein: 24g   NEW FINDINGS   RD notified by PharmD that team is concerned pt is not absorbing any oral intake. Per I/O's, pt with 7 stool occurrences documented 2/21 and 5 occurrences documented 2/20. Attempted room visit, however, pt off unit at time of visit. PO intake decreased 2/21 in comparison to  2/20. Will re- increase TPN and continue to monitor bowel function, I/O's, PO/TF intake. Calorie counts ongoing.     ASSESSED NUTRITION NEEDS   Dosing Weight: Actual Body Weight 43.4 kg   Estimated Energy Needs: 1520 - 1735 kcals/day (35 - 40 kcals/kg)   Justification: Repletion and Underweight   Estimated Protein Needs: 52 - 61 grams protein/day (1.2 - 1.4 grams of pro/kg)   Justification: Repletion   Estimated Fluid Needs: 1520 - 1735 mL/day (1 mL/kcal)   Justification: Maintenance   GI:  Last BM: 02/22/24  PRN bowel med(s), given imodium today.    Weight:  Most Recent Weight: 47.4 kg (104 lb 9.6 oz)  on 2/20/24 via Bed scale  Body mass index is 19.25 kg/m .  Wt up 4 kg from admission. I/O's -5.9L since admit.    Meds:  Caltrate  Vit B12  Protonix  Abx  Neutra-phos  Prednisone  Renvela  tacro    Labs:  Na 134  BUN 43  Cre 2.79  GFR 19  CRP 7.57  Glu 144    Skin:  Reviewed    Last HD 2/22  Per nephrology note 2/22: limit TPN to < 1.5 L per day   MALNUTRITION(Per evaluation 2/16)  % Intake: </= 50% for >/= 5 days (severe)  % Weight Loss: > 20% in 1 year (severe)  Subcutaneous Fat Loss: Global severe  Muscle Loss: Global severe  Fluid Accumulation/Edema: Moderate  Malnutrition Diagnosis: Severe malnutrition in the context of chronic disease    Previous Goals   Total avg nutritional intake to meet a minimum of 35 kcal/kg and 1.2 g PRO/kg daily (per dosing wt 43.4 kg).   Evaluation: Not met    Previous Nutrition Diagnosis  Inadequate enteral nutrition infusion related to nausea, gastroparesis as evidenced by pt self report limiting infusions, 30% wt loss x 1 year, muscle and fat wasting   Evaluation: No change    CURRENT NUTRITION DIAGNOSIS  Inadequate enteral nutrition infusion related to nausea, gastroparesis as evidenced by pt self report limiting infusions, 30% wt loss x 1 year, muscle and fat wasting     INTERVENTIONS  Implementation  Collaboration with other providers  Enteral Nutrition - continue  Parenteral  Nutrition/IV Fluids - Modify composition     Goals  Total avg nutritional intake to meet a minimum of 35 kcal/kg and 1.2 g PRO/kg daily (per dosing wt 43.4 kg).     Monitoring/Evaluation  Progress toward goals will be monitored and evaluated per protocol.    Nimco Lion MS, RD, LD, [facility]    6C (beds 5449-6012) + 7C (beds 9735-6900) + ED   RD pager: [phone]  Weekend/Holiday RD pager: [phone]

## 2024-02-22 NOTE — PROGRESS NOTES
Gave report to 7C nurse. Pt transferred with all her belongings.     Neuros:  A&Ox4, denies dizziness, n/v, tingling or numbness.   Vitals: Vitally stable, Afebrile  Pain: headache, back and hip pain, given tylenol with relief.   Resp: lung sounds are clear with no SOB, stable on room air.   GI:loose BM 02/21.    :anuric. ON HD (T,TH,Sat)  Cardiovascular: WNL  Lines/Daines: R chest port infusing TPN, R PIV infusing ABT and TKO, L AV fistula. GT- clamped, JT- on cont TF  Skin: dependent edema on all extremities except R arm.   Diet: Renal diet, fair appetite. Katefarms peptide 1.5@ 10cc/hr, 30cc water flush q 4 hours.   Activity: A1, walker, gb. Able to transfer to commode with SBA  Recommendations/Plan: Pain management, cont.with ABX, assess response to interventions, and follow POC

## 2024-02-23 ENCOUNTER — APPOINTMENT (OUTPATIENT)
Dept: PHYSICAL THERAPY | Facility: CLINIC | Age: 64
DRG: 640 | End: 2024-02-23
Attending: INTERNAL MEDICINE
Payer: MEDICARE

## 2024-02-23 ENCOUNTER — APPOINTMENT (OUTPATIENT)
Dept: OCCUPATIONAL THERAPY | Facility: CLINIC | Age: 64
DRG: 640 | End: 2024-02-23
Attending: INTERNAL MEDICINE
Payer: MEDICARE

## 2024-02-23 LAB
ALBUMIN SERPL BCG-MCNC: 3 G/DL (ref 3.5–5.2)
ALP SERPL-CCNC: 60 U/L (ref 40–150)
ALT SERPL W P-5'-P-CCNC: 8 U/L (ref 0–50)
ANION GAP SERPL CALCULATED.3IONS-SCNC: 10 MMOL/L (ref 7–15)
AST SERPL W P-5'-P-CCNC: 21 U/L (ref 0–45)
BACTERIA BLD CULT: NO GROWTH
BASE EXCESS BLDV CALC-SCNC: 1.3 MMOL/L (ref -3–3)
BASOPHILS # BLD AUTO: ABNORMAL 10*3/UL
BASOPHILS # BLD MANUAL: 0 10E3/UL (ref 0–0.2)
BASOPHILS NFR BLD AUTO: ABNORMAL %
BASOPHILS NFR BLD MANUAL: 0 %
BILIRUB SERPL-MCNC: 0.2 MG/DL
BUN SERPL-MCNC: 29.2 MG/DL (ref 8–23)
CALCIUM SERPL-MCNC: 8.6 MG/DL (ref 8.8–10.2)
CHLORIDE SERPL-SCNC: 101 MMOL/L (ref 98–107)
CREAT SERPL-MCNC: 2.04 MG/DL (ref 0.51–0.95)
DEPRECATED HCO3 PLAS-SCNC: 27 MMOL/L (ref 22–29)
EGFRCR SERPLBLD CKD-EPI 2021: 27 ML/MIN/1.73M2
EOSINOPHIL # BLD AUTO: ABNORMAL 10*3/UL
EOSINOPHIL # BLD MANUAL: 0.1 10E3/UL (ref 0–0.7)
EOSINOPHIL NFR BLD AUTO: ABNORMAL %
EOSINOPHIL NFR BLD MANUAL: 2 %
ERYTHROCYTE [DISTWIDTH] IN BLOOD BY AUTOMATED COUNT: 20.5 % (ref 10–15)
GLUCOSE SERPL-MCNC: 125 MG/DL (ref 70–99)
HCO3 BLDV-SCNC: 29 MMOL/L (ref 21–28)
HCT VFR BLD AUTO: 28.4 % (ref 35–47)
HGB BLD-MCNC: 8.5 G/DL (ref 11.7–15.7)
IMM GRANULOCYTES # BLD: ABNORMAL 10*3/UL
IMM GRANULOCYTES NFR BLD: ABNORMAL %
LYMPHOCYTES # BLD AUTO: ABNORMAL 10*3/UL
LYMPHOCYTES # BLD MANUAL: 1.3 10E3/UL (ref 0.8–5.3)
LYMPHOCYTES NFR BLD AUTO: ABNORMAL %
LYMPHOCYTES NFR BLD MANUAL: 25 %
MAGNESIUM SERPL-MCNC: 1.8 MG/DL (ref 1.7–2.3)
MAGNESIUM SERPL-MCNC: 1.9 MG/DL (ref 1.7–2.3)
MCH RBC QN AUTO: 35.3 PG (ref 26.5–33)
MCHC RBC AUTO-ENTMCNC: 29.9 G/DL (ref 31.5–36.5)
MCV RBC AUTO: 118 FL (ref 78–100)
MONOCYTES # BLD AUTO: ABNORMAL 10*3/UL
MONOCYTES # BLD MANUAL: 0.8 10E3/UL (ref 0–1.3)
MONOCYTES NFR BLD AUTO: ABNORMAL %
MONOCYTES NFR BLD MANUAL: 16 %
MYELOCYTES # BLD MANUAL: 0.2 10E3/UL
MYELOCYTES NFR BLD MANUAL: 4 %
NEUTROPHILS # BLD AUTO: ABNORMAL 10*3/UL
NEUTROPHILS # BLD MANUAL: 2.7 10E3/UL (ref 1.6–8.3)
NEUTROPHILS NFR BLD AUTO: ABNORMAL %
NEUTROPHILS NFR BLD MANUAL: 53 %
NRBC # BLD AUTO: 0.1 10E3/UL
NRBC BLD AUTO-RTO: 1 /100
O2/TOTAL GAS SETTING VFR VENT: 21 %
OXYHGB MFR BLDV: 90 % (ref 70–75)
PCO2 BLDV: 64 MM HG (ref 40–50)
PH BLDV: 7.26 [PH] (ref 7.32–7.43)
PHOSPHATE SERPL-MCNC: 3.4 MG/DL (ref 2.5–4.5)
PHOSPHATE SERPL-MCNC: 3.7 MG/DL (ref 2.5–4.5)
PLAT MORPH BLD: ABNORMAL
PLATELET # BLD AUTO: 211 10E3/UL (ref 150–450)
PO2 BLDV: 66 MM HG (ref 25–47)
POLYCHROMASIA BLD QL SMEAR: SLIGHT
POTASSIUM SERPL-SCNC: 3.7 MMOL/L (ref 3.4–5.3)
PROT SERPL-MCNC: 5.1 G/DL (ref 6.4–8.3)
RBC # BLD AUTO: 2.41 10E6/UL (ref 3.8–5.2)
RBC MORPH BLD: ABNORMAL
SAO2 % BLDV: 91.5 % (ref 70–75)
SODIUM SERPL-SCNC: 138 MMOL/L (ref 135–145)
TACROLIMUS BLD-MCNC: 9.6 UG/L (ref 5–15)
TME LAST DOSE: NORMAL H
TME LAST DOSE: NORMAL H
WBC # BLD AUTO: 5 10E3/UL (ref 4–11)

## 2024-02-23 PROCEDURE — 83735 ASSAY OF MAGNESIUM: CPT

## 2024-02-23 PROCEDURE — 250N000011 HC RX IP 250 OP 636

## 2024-02-23 PROCEDURE — 97530 THERAPEUTIC ACTIVITIES: CPT | Mod: GP,59

## 2024-02-23 PROCEDURE — 87040 BLOOD CULTURE FOR BACTERIA: CPT | Performed by: STUDENT IN AN ORGANIZED HEALTH CARE EDUCATION/TRAINING PROGRAM

## 2024-02-23 PROCEDURE — 99232 SBSQ HOSP IP/OBS MODERATE 35: CPT | Mod: GC | Performed by: STUDENT IN AN ORGANIZED HEALTH CARE EDUCATION/TRAINING PROGRAM

## 2024-02-23 PROCEDURE — 250N000012 HC RX MED GY IP 250 OP 636 PS 637: Performed by: INTERNAL MEDICINE

## 2024-02-23 PROCEDURE — 36591 DRAW BLOOD OFF VENOUS DEVICE: CPT

## 2024-02-23 PROCEDURE — 97530 THERAPEUTIC ACTIVITIES: CPT | Mod: GO,59

## 2024-02-23 PROCEDURE — 250N000009 HC RX 250: Performed by: STUDENT IN AN ORGANIZED HEALTH CARE EDUCATION/TRAINING PROGRAM

## 2024-02-23 PROCEDURE — 36591 DRAW BLOOD OFF VENOUS DEVICE: CPT | Performed by: STUDENT IN AN ORGANIZED HEALTH CARE EDUCATION/TRAINING PROGRAM

## 2024-02-23 PROCEDURE — 82805 BLOOD GASES W/O2 SATURATION: CPT

## 2024-02-23 PROCEDURE — 250N000013 HC RX MED GY IP 250 OP 250 PS 637

## 2024-02-23 PROCEDURE — 250N000011 HC RX IP 250 OP 636: Performed by: INTERNAL MEDICINE

## 2024-02-23 PROCEDURE — 84100 ASSAY OF PHOSPHORUS: CPT

## 2024-02-23 PROCEDURE — 36415 COLL VENOUS BLD VENIPUNCTURE: CPT | Performed by: INTERNAL MEDICINE

## 2024-02-23 PROCEDURE — 85027 COMPLETE CBC AUTOMATED: CPT

## 2024-02-23 PROCEDURE — 97535 SELF CARE MNGMENT TRAINING: CPT | Mod: GO

## 2024-02-23 PROCEDURE — 80197 ASSAY OF TACROLIMUS: CPT | Performed by: INTERNAL MEDICINE

## 2024-02-23 PROCEDURE — 250N000013 HC RX MED GY IP 250 OP 250 PS 637: Performed by: STUDENT IN AN ORGANIZED HEALTH CARE EDUCATION/TRAINING PROGRAM

## 2024-02-23 PROCEDURE — 36415 COLL VENOUS BLD VENIPUNCTURE: CPT

## 2024-02-23 PROCEDURE — B4185 PARENTERAL SOL 10 GM LIPIDS: HCPCS | Mod: JZ | Performed by: INTERNAL MEDICINE

## 2024-02-23 PROCEDURE — 85007 BL SMEAR W/DIFF WBC COUNT: CPT

## 2024-02-23 PROCEDURE — 250N000009 HC RX 250: Mod: JZ | Performed by: INTERNAL MEDICINE

## 2024-02-23 PROCEDURE — 250N000009 HC RX 250: Performed by: INTERNAL MEDICINE

## 2024-02-23 PROCEDURE — 99233 SBSQ HOSP IP/OBS HIGH 50: CPT | Mod: 24 | Performed by: INTERNAL MEDICINE

## 2024-02-23 PROCEDURE — 120N000002 HC R&B MED SURG/OB UMMC

## 2024-02-23 PROCEDURE — 95992 CANALITH REPOSITIONING PROC: CPT | Mod: GP

## 2024-02-23 PROCEDURE — 80053 COMPREHEN METABOLIC PANEL: CPT | Performed by: INTERNAL MEDICINE

## 2024-02-23 PROCEDURE — 250N000009 HC RX 250

## 2024-02-23 PROCEDURE — 250N000013 HC RX MED GY IP 250 OP 250 PS 637: Performed by: INTERNAL MEDICINE

## 2024-02-23 RX ORDER — ACETAMINOPHEN 325 MG/1
975 TABLET ORAL EVERY 8 HOURS PRN
Status: DISCONTINUED | OUTPATIENT
Start: 2024-02-23 | End: 2024-04-15 | Stop reason: HOSPADM

## 2024-02-23 RX ADMIN — CYANOCOBALAMIN TAB 500 MCG 500 MCG: 500 TAB at 09:24

## 2024-02-23 RX ADMIN — ONDANSETRON 4 MG: 2 INJECTION INTRAMUSCULAR; INTRAVENOUS at 18:39

## 2024-02-23 RX ADMIN — LOPERAMIDE HYDROCHLORIDE 2 MG: 2 CAPSULE ORAL at 20:10

## 2024-02-23 RX ADMIN — ONDANSETRON 4 MG: 2 INJECTION INTRAMUSCULAR; INTRAVENOUS at 13:31

## 2024-02-23 RX ADMIN — PIPERACILLIN AND TAZOBACTAM 2.25 G: 2; .25 INJECTION, POWDER, FOR SOLUTION INTRAVENOUS at 06:14

## 2024-02-23 RX ADMIN — Medication 40 MG: at 22:12

## 2024-02-23 RX ADMIN — CALCIUM CARBONATE 600 MG (1,500 MG)-VITAMIN D3 400 UNIT TABLET 1 TABLET: at 18:39

## 2024-02-23 RX ADMIN — CALCIUM CARBONATE 600 MG (1,500 MG)-VITAMIN D3 400 UNIT TABLET 1 TABLET: at 14:12

## 2024-02-23 RX ADMIN — HEPARIN SODIUM 5000 UNITS: 5000 INJECTION, SOLUTION INTRAVENOUS; SUBCUTANEOUS at 13:31

## 2024-02-23 RX ADMIN — CALCIUM CARBONATE 600 MG (1,500 MG)-VITAMIN D3 400 UNIT TABLET 1 TABLET: at 09:26

## 2024-02-23 RX ADMIN — PREDNISONE 5 MG: 5 TABLET ORAL at 09:24

## 2024-02-23 RX ADMIN — LIDOCAINE 4% 1 PATCH: 40 PATCH TOPICAL at 19:58

## 2024-02-23 RX ADMIN — METOPROLOL TARTRATE 25 MG: 100 TABLET, FILM COATED ORAL at 20:00

## 2024-02-23 RX ADMIN — POTASSIUM & SODIUM PHOSPHATES POWDER PACK 280-160-250 MG 1 PACKET: 280-160-250 PACK at 09:24

## 2024-02-23 RX ADMIN — POTASSIUM & SODIUM PHOSPHATES POWDER PACK 280-160-250 MG 1 PACKET: 280-160-250 PACK at 20:00

## 2024-02-23 RX ADMIN — Medication 40 MG: at 09:24

## 2024-02-23 RX ADMIN — DAPSONE 50 MG: 100 TABLET ORAL at 09:24

## 2024-02-23 RX ADMIN — ACETAMINOPHEN 650 MG: 325 TABLET, FILM COATED ORAL at 06:14

## 2024-02-23 RX ADMIN — MAGNESIUM SULFATE HEPTAHYDRATE: 500 INJECTION, SOLUTION INTRAMUSCULAR; INTRAVENOUS at 20:12

## 2024-02-23 RX ADMIN — LOPERAMIDE HYDROCHLORIDE 2 MG: 2 CAPSULE ORAL at 14:12

## 2024-02-23 RX ADMIN — LOPERAMIDE HYDROCHLORIDE 2 MG: 2 CAPSULE ORAL at 18:45

## 2024-02-23 RX ADMIN — OLIVE OIL AND SOYBEAN OIL 250 ML: 16; 4 INJECTION, EMULSION INTRAVENOUS at 20:22

## 2024-02-23 RX ADMIN — PIPERACILLIN AND TAZOBACTAM 2.25 G: 2; .25 INJECTION, POWDER, FOR SOLUTION INTRAVENOUS at 14:58

## 2024-02-23 RX ADMIN — TACROLIMUS 4 MG: 5 CAPSULE ORAL at 09:24

## 2024-02-23 RX ADMIN — HEPARIN SODIUM 5000 UNITS: 5000 INJECTION, SOLUTION INTRAVENOUS; SUBCUTANEOUS at 00:59

## 2024-02-23 RX ADMIN — PIPERACILLIN AND TAZOBACTAM 2.25 G: 2; .25 INJECTION, POWDER, FOR SOLUTION INTRAVENOUS at 00:59

## 2024-02-23 RX ADMIN — LOPERAMIDE HYDROCHLORIDE 2 MG: 2 CAPSULE ORAL at 09:24

## 2024-02-23 RX ADMIN — TACROLIMUS 4.5 MG: 5 CAPSULE ORAL at 20:00

## 2024-02-23 RX ADMIN — PREDNISONE 2.5 MG: 2.5 TABLET ORAL at 19:59

## 2024-02-23 RX ADMIN — PIPERACILLIN AND TAZOBACTAM 2.25 G: 2; .25 INJECTION, POWDER, FOR SOLUTION INTRAVENOUS at 20:28

## 2024-02-23 RX ADMIN — ACETAMINOPHEN 650 MG: 325 TABLET, FILM COATED ORAL at 00:59

## 2024-02-23 RX ADMIN — METOPROLOL TARTRATE 25 MG: 100 TABLET, FILM COATED ORAL at 09:24

## 2024-02-23 ASSESSMENT — ACTIVITIES OF DAILY LIVING (ADL)
ADLS_ACUITY_SCORE: 38

## 2024-02-23 NOTE — PROGRESS NOTES
Lung Transplant Consult Follow Up Note   February 23, 2024            Assessment and Plan:   Sofie Rodriguez is a 63 year old female with h/o bilateral lung transplant for COPD on 6/28/22 with course complicated by post-operative hemidiaphragm palsy, recurrent PNAs, positive DSA, EBV viremia, hypogammaglobulinemia, severe gastroparesis s/p G/J tube placement 7/27/22 with pyloric botox 1/25/23, GI bleed 2/2 pyloric ulcer, hemobilia s/p ERCP and MRCP, chronic diarrhea, recurrent C diff colitis, and ESRD on HD.   Admitted May 2023 for FTT, supposed to be readmitted in July for FTT, but refused. Admitted to OSH 12/4-12/31 in December for right hip fracture s/p ORIF, now with significant deconditioning and ongoing severe malnutrition with gastroparesis, small bowel hypermotility and failure to tolerate any tube feeds.   After extensive discussion with the dietician, GI and ultimately convincing the patient, the patient was admitted on 2/10 for initiation of TPN/lipids. She is s/p port placement with persistent pain at port site. GI recommends trickle feeds for maintaining bowel and CT enterography to look for structural abnormalities (unable due to large bolus enteral contrast required for the study). She was transferred to the ICU on 2/17 with worsening mental status, worsening respiratory acidosis despite Bpap use, ultimately requiring intubation and mechanical ventilation. CT chest concerning for new infection vs volume overload and started on empiric antimicrobial therapy (micafungin, zosyn, vanco) however extubated 2/19 and infectious work up is negative to date. Persistent CO2 retention. Tolerating TPN and moderate PO intake.     Recommendations:   - Elevate left arm  - Encourage hiflow NC  - VBG in AM (Ordered)  - Complete 7 day course of zosyn  - Electrolyte replacement as per primary team to manage patients probable refeeding syndrome  - Strict I/Os and daily weights,  - O2 to keep SaO2 > 92%  - Prospera 2/18  (pending)  - Tacro level pending   - OK to stop TF if patient taking PO.     S/p bilateral lung transplant:   Right hemidiaphragm palsy:   Suspected CARLEE:  New consolidated nodular opacities and GGO concerning for infection:  Severely deconditioned. CMV 2/7 negative. ImmuKnow 108 and cfDNA 0.12 on 1/10. CT 2/7 with decreased MARLON opacities, new tree in bud RLL opacities without new symptoms. PFTs unchanged in clinic (but ATS not met), remain significantly below her baseline (1.4-1.5L). Noted increased dyspnea since the Port-A-Cath placement.  Review of notes indicate patient should be on 2 L o2 at night from prior overnight O2 study in Jan 2023. Also reported a slight increase in cough. She was requiring O2 for comfort only but decompensated on 2/17 with worsening encephalopathy, respiratory acidosis (pCO2 up to 106). CT chest 2/17 showed very patchy and new consolidative, nodular opacities, GGO primarily in the bases and intralobular septal thickening concerning for pulmonary edema and volume overload along with new, possibly fungal vs. Atypical infection vs. PTLD (less likely but in the differential) vs. Septic emboli.  Based on negative infectious work up at this time, would favor volume overload in the setting of initiation of TPN. Marked clinical improvement with dialysis except ongoing CO2 retention.   - Persistent CO2 retention since extubation although may be chronic based on fall 2022 VBGs. The patient refuses BiPAP. Continue trial of high flow with FiO2 21% in an effort to reduce PCO2. Monitor with VBG. (VBG Ordered)  - bronch with lavage of RML 2/18 showed very friable tissue  - 2/18 bronch cultures NGTD  - 2/18 Galactomannan (-)  - 2/18 Fungitell (-)  - Complete 7 days of zosyn and then stop if stable  - Micafungin 2/18-2/21,no evidence of fungus so stopped.  - DSA 2/7 with persistent DQ B2, MFI increased to 6966, see below  - Prospera to evaluate significance of positive DSA (2/18 pending )  - repeat  overnight oximetry study 24-48h before discharge.  - schedule follow up with sleep to discuss possible CPAP given recommendations from August sleep study  - appreciate strict I&O and volume management as per primary and nephrology     Immunosuppression:  - Tacrolimus 4 mg qAM and tacrolimus 4.5 mg qPM. Goal level 7-9. Repeat level 2/23 (ordered).   - Await Prospera (pending from 2/18) for consideration of IS regimen alterations  - Continue Prednisone 5 mg/2.5 mg     Prophylaxis:   - Dapsone 50 mg q MWF for PJP ppx     Positive DSA: no prior treatment for AMR, has been watched for quite some time given no pulmonary symptoms, prior PFTs stability and significant and ongoing failure to thrive. Last DSA improved to 5723, however will ongoing lower PFTs, may need to consider AMR treatment, however, unclear how she would tolerate.   -  DSA 2/7 with persistent DQ B2, MFI increased to 6966, see below  - Prospera to evaluate significance of positive DSA (pending 2/18)     ESRD: Dialysis dependent.  Suspect her respiratory symptoms were volume overload secondary to initiation of TPN.  CT chest suggesting volume overload. Has undergone multiple days in a row of dialysis to try to pull fluid  -BNP >70,000.  -Dialysis as per nephrology.  -May require daily dialysis until euvolemic again  -Monitor strict ins and outs      EBV viremia: last level of 96K (2/7), CT c/a/p (2/7) without adenopathy.  - EBV 2/18 - 94089.    - CMV 2/19 (-)     Acute metabolic encephalopathy - resolved     Severe protein calorie malnutrition:  FTT:   Gastroparesis s/p PEG/J s/p botox and G-POEM:   SB Hypomotility  Pyloric ulcer:  Chronic nausea and osmotic diarrhea:  SIBO s/p rifaximin:   Recurrent C diff colitis: chronic diarrhea since transplant with recurrent episodes of C diff. GI previously consulted, felt stools consistent with osmotic diarrhea. Over the last year has lost 40 lbs, unable to tolerate any combination of TF, most recently elemental  "formula. Normal fecal elastase last May. Following with Dr. Navarro who feels her main two issues are vagal injury induced gastroparesis and probably small bowel hypomotility. Her intolerance to J-tube feeds suggests the latter to be a significant issue (notes in a message that there are no motility experts at the  and he is limited in what can be offered). Now s/p port placement with TPN and lipids. GI recommending CT to assess for structural issues. Now taking modest PO.  -close monitoring as patient is high risk for refeeding syndrome  -Recs per primary and GI  -Concern that patient will not tolerate CT enterography according to chart notes as she will require 1500 mL enteral contrast bolus which she is unlikely to tolerate given her gastroparesis and reduced bowel function.  - OK to hold trickle TF if patient is tolerating PO consistently.     We appreciate the excellent care provided by the ICU team.    Will continue to follow along closely, please do not hesitate to call with any questions or concerns.         Ajay Castillo MD  [phone]              Interval History:   Used high flow NC for part of the night but then stopped due to discomfort from the head strap.   O/W slept well.  Portacath pain improving.H/A resolved.  Nausea and abd pain if eats \"too much\" .  Persistent loose stool  Dyspnea at rest: None   Dyspnea on exertion: more limited by LE weakness than dyspnea  Cough: occ, no change  Sputum: small amt, swallowed  Hemoptysis: none   Chest Pain: None           Review of Systems:   C: NEGATIVE for fever, chills.   INTEGUMENTARY/SKIN: no rash or obvious new lesions  ENT/MOUTH: right ear \"blocked\". no sore throat, new sinus pain or nasal drainage  RESP: see interval history  CV: NEGATIVE for chest pain, palpitations. No change peripheral edema  GI: See interval history.   : dialysis  MUSCULOSKELETAL: no myalgias, arthralgias            Medications:      calcium carbonate-vitamin D  1 tablet Per J Tube " TID w/meals    cyanocobalamin  500 mcg Per Feeding Tube Daily    dapsone  50 mg Per J Tube Q Mon Wed Fri AM    heparin ANTICOAGULANT  5,000 Units Subcutaneous Q12H    lidocaine  1 patch Transdermal Q24h    lipids plant base  250 mL Intravenous Once per day on Monday Wednesday Friday    metoprolol  25 mg Per J Tube BID    pantoprazole  40 mg Per J Tube BID    piperacillin-tazobactam  2.25 g Intravenous Q6H    potassium & sodium phosphates  1 packet Oral or Feeding Tube BID    predniSONE  5 mg Per J Tube QAM    And    predniSONE  2.5 mg Per J Tube QPM    [Held by provider] sevelamer carbonate (RENVELA)  0.8 g Oral BID    tacrolimus  4 mg Per J Tube QAM    And    tacrolimus  4.5 mg Per J Tube QPM     acetaminophen, albumin human, albuterol, calcium carbonate, dextrose, dextrose, glucose **OR** dextrose **OR** glucagon, lidocaine (buffered or not buffered), lidocaine-prilocaine, loperamide, naloxone **OR** naloxone **OR** naloxone **OR** naloxone, ondansetron **OR** ondansetron, senna-docusate **OR** senna-docusate, sodium chloride, sodium chloride (PF), sodium chloride 0.9%         Physical Exam:   Temp:  [97  F (36.1  C)-98.2  F (36.8  C)] 97  F (36.1  C)  Pulse:  [85-98] 85  Resp:  [12-30] 18  BP: (108-132)/(57-83) 108/57  FiO2 (%):  [21 %] 21 %  SpO2:  [94 %-99 %] 95 %    Intake/Output Summary (Last 24 hours) at 2/23/2024 0731  Last data filed at 2/23/2024 0200  Gross per 24 hour   Intake 880 ml   Output 3500 ml   Net -2620 ml     Constitutional:   Awake, alert and in no apparent distress     Eyes:   nonicteric     ENT:    oral mucosa moist without lesions     Neck:   Supple without supraclavicular or cervical lymphadenopathy     Lungs:   Good air flow.  No crackles. No rhonchi.  No wheezes.     Cardiovascular:   Normal S1 and S2.  RRR.  I/VI sys  murmur. No gallop. No rub.     Abdomen:   NABS, soft, nondistended, nontender.  No HSM.     Musculoskeletal:   2+ BLE edema, 3+ LUE Edema     Neurologic:   Alert and  conversant.     Skin:   Warm, dry.  No rash on limited exam.             Data:   All laboratory and imaging data reviewed.    Results for orders placed or performed during the hospital encounter of 02/10/24 (from the past 24 hour(s))   Magnesium   Result Value Ref Range    Magnesium 1.8 1.7 - 2.3 mg/dL   Phosphorus   Result Value Ref Range    Phosphorus 2.5 2.5 - 4.5 mg/dL   Basic metabolic panel   Result Value Ref Range    Sodium 137 135 - 145 mmol/L    Potassium 3.5 3.4 - 5.3 mmol/L    Chloride 100 98 - 107 mmol/L    Carbon Dioxide (CO2) 27 22 - 29 mmol/L    Anion Gap 10 7 - 15 mmol/L    Urea Nitrogen 21.4 8.0 - 23.0 mg/dL    Creatinine 1.62 (H) 0.51 - 0.95 mg/dL    GFR Estimate 36 (L) >60 mL/min/1.73m2    Calcium 8.0 (L) 8.8 - 10.2 mg/dL    Glucose 145 (H) 70 - 99 mg/dL   Phosphorus   Result Value Ref Range    Phosphorus 2.5 2.5 - 4.5 mg/dL   CBC with Platelets & Differential    Narrative    The following orders were created for panel order CBC with Platelets & Differential.  Procedure                               Abnormality         Status                     ---------                               -----------         ------                     CBC with platelets and d...[[id number]]  Abnormal            Final result               Manual Differential[[id number]]          Abnormal            Final result                 Please view results for these tests on the individual orders.   Magnesium   Result Value Ref Range    Magnesium 1.8 1.7 - 2.3 mg/dL   Phosphorus   Result Value Ref Range    Phosphorus 3.4 2.5 - 4.5 mg/dL   Comprehensive metabolic panel   Result Value Ref Range    Sodium 138 135 - 145 mmol/L    Potassium 3.7 3.4 - 5.3 mmol/L    Carbon Dioxide (CO2) 27 22 - 29 mmol/L    Anion Gap 10 7 - 15 mmol/L    Urea Nitrogen 29.2 (H) 8.0 - 23.0 mg/dL    Creatinine 2.04 (H) 0.51 - 0.95 mg/dL    GFR Estimate 27 (L) >60 mL/min/1.73m2    Calcium 8.6 (L) 8.8 - 10.2 mg/dL    Chloride 101 98 - 107 mmol/L    Glucose 125  (H) 70 - 99 mg/dL    Alkaline Phosphatase 60 40 - 150 U/L    AST 21 0 - 45 U/L    ALT 8 0 - 50 U/L    Protein Total 5.1 (L) 6.4 - 8.3 g/dL    Albumin 3.0 (L) 3.5 - 5.2 g/dL    Bilirubin Total 0.2 <=1.2 mg/dL   CBC with platelets and differential   Result Value Ref Range    WBC Count 5.0 4.0 - 11.0 10e3/uL    RBC Count 2.41 (L) 3.80 - 5.20 10e6/uL    Hemoglobin 8.5 (L) 11.7 - 15.7 g/dL    Hematocrit 28.4 (L) 35.0 - 47.0 %     (H) 78 - 100 fL    MCH 35.3 (H) 26.5 - 33.0 pg    MCHC 29.9 (L) 31.5 - 36.5 g/dL    RDW 20.5 (H) 10.0 - 15.0 %    Platelet Count 211 150 - 450 10e3/uL    % Neutrophils      % Lymphocytes      % Monocytes      % Eosinophils      % Basophils      % Immature Granulocytes      NRBCs per 100 WBC 1 (H) <1 /100    Absolute Neutrophils      Absolute Lymphocytes      Absolute Monocytes      Absolute Eosinophils      Absolute Basophils      Absolute Immature Granulocytes      Absolute NRBCs 0.1 10e3/uL   Manual Differential   Result Value Ref Range    % Neutrophils 53 %    % Lymphocytes 25 %    % Monocytes 16 %    % Eosinophils 2 %    % Basophils 0 %    % Myelocytes 4 %    Absolute Neutrophils 2.7 1.6 - 8.3 10e3/uL    Absolute Lymphocytes 1.3 0.8 - 5.3 10e3/uL    Absolute Monocytes 0.8 0.0 - 1.3 10e3/uL    Absolute Eosinophils 0.1 0.0 - 0.7 10e3/uL    Absolute Basophils 0.0 0.0 - 0.2 10e3/uL    Absolute Myelocytes 0.2 (H) <=0.0 10e3/uL    RBC Morphology Confirmed RBC Indices     Platelet Assessment  Automated Count Confirmed. Platelet morphology is normal.     Automated Count Confirmed. Platelet morphology is normal.    Polychromasia Slight (A) None Seen     *Note: Due to a large number of results and/or encounters for the requested time period, some results have not been displayed. A complete set of results can be found in Results Review.

## 2024-02-23 NOTE — PLAN OF CARE
"Goal Outcome Evaluation:      Plan of Care Reviewed With: patient    Overall Patient Progress: no change    Outcome Evaluation: Pt had HD this AM and under 2 L dialysized. VSS on RA. Pt put on high flow NC at 30-40% d/t inability to handle BiPAP and high CO2 levels. Pt reports NC much better than mask and tolerating well. Up to BSC with multiple BM and small amounts of clear, yellow urine. Imodium given x1 for loose stools. Zofran given x1 for nausea with relief. BG checks and insulin removed from orders per MD. Trialing off TF from 8500-8635 2/23 now that pt is eating and drinking. TPN infusing cont. through R port, zosyn infusing thrpough PIV and TKO in between doses. PEGJ WDL and patent with meds and flushes. Continue to monitor and and plan for potential L fistula revision with vascular surgery.    /64 (BP Location: Right arm)   Pulse 85   Temp 98.2  F (36.8  C) (Oral)   Resp 20   Ht 1.57 m (5' 1.81\")   Wt 49.1 kg (108 lb 3.9 oz)   SpO2 97%   BMI 19.92 kg/m       Does my patient have a central line? PORT Right  Does my patient still meet line necessity requirements? YES, TPN/Lipids administration  Was line necessity documentation completed in the flowsheet? YES  Was the provider notified to order CVC removal if applicable? NO, plan to continue use for nutrition supplementation    Yuliet Brannon RN on 2/22/2024 at 7:07 PM       "

## 2024-02-23 NOTE — PROGRESS NOTES
Calorie Count  Intake recorded for: 2/22  Total Kcals: 761 Total Protein: 17g  Kcals from Hospital Food: 761   Protein: 17g  Kcals from Outside Food (average):0 Protein: 0g  # Meals Ordered from Kitchen: 4 meals  # Meals Recorded: 2 meals  (First - 100% 2 Jello, ice cream, 75% home fried potatoes)  (Second - 100% 2 Froot Loops, 1 8oz 1% milk, ice cream)  # Supplements Recorded: 0

## 2024-02-23 NOTE — PLAN OF CARE
Goal Outcome Evaluation:      Plan of Care Reviewed With: patient    Overall Patient Progress: no changeOverall Patient Progress: no change    Outcome Evaluation: 1900-0730: AOx4, VSS on HF NC due to refusing bipap. Patient intermittently taking supplemental oxygen off, stating it is causing a headache and neck ache. On scheduled tyelnol for pain. Left arm and AV fistula reddened and edemetous. Encouraging elevation. Right chest port infusing TPN. Left PIV TKO/infusing abx intermittently. Meds given via PEG. TF held overnight due to patient increasing PO intake. Up to BSC independently. Several small soft/loose stools. Slept most of the night. No acute changes.

## 2024-02-23 NOTE — PROGRESS NOTES
Care Management Follow Up    Length of Stay (days): 12    Expected Discharge Date:  unknown      Concerns to be Addressed: discharge planning, other (see comments) (New TPN)     Patient plan of care discussed at interdisciplinary rounds: Yes    Anticipated Discharge Disposition: LTACH, Transitional Care     Anticipated Discharge Services: None  Anticipated Discharge DME: None    Patient/family educated on Medicare website which has current facility and service quality ratings:    Education Provided on the Discharge Plan: Yes  Patient/Family in Agreement with the Plan: yes    Referrals Placed by CM/SW: External Care Coordination  Private pay costs discussed: Not applicable    Additional Information:  Per pulm team, patient may soon be ready for post acute care/rehab.  Patient has TPN, tube feedings and his on out patient dialysis.  Have requested [facility] review for LTACH. If not appropriate for LTACH, anticipate this could be a very difficult placement due ot complex needs.    PLAN: will follow to work for appropriate placement.     JOAN Hannon  Lung Transplant   Phone: [phone] Pager: [phone]

## 2024-02-23 NOTE — PROGRESS NOTES
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 On 2/17/24 she was transferred to ICU for worsening mental status and acute hypoxic and hypercarbic respiratory failure inspite of BiPAP requiring intubation. She was suspected to have PNA and started on antibiotics. Extubated on 2/19 without complications, now on RA, tolerated iHD on 2/20, and tolerating PO regular diet. Will hold tube feed and monitor calorie counts to determine nutrition plan for discharge. Also monitoring VBG for pCO2 and trial of HFNC. Will need overnight oximetry testing done prior to discharge to determine if more urgent follow up with sleep clinic is needed.         Changes today:   - will perform overnight oximetry study prior to discharge, plan for tomorrow as patient must be on RA (currently on HFNC trial to attempt to reduce pCO2 as patient refuses BiPAP due to claustrophobia from mask)  - VBG in AM - will stop HFNC if CO2 not improving  - Blood culture from tunneled line/Rt CVC- pending  - Regular diet + increased TPN + stop TF as per RD rec   - Continue calorie counts, daily weights, I/Os  - Appreciate care coordination assistance with setting up home TPN infusions  - last day of Zosyn (2/17 -  2/23)        # Severe malnutrition   # FTT   # Gastroparesis, small bowel dysmotility  # S/P PEG/J with intolerance of enteral nutrition     Patient with gastroparesis (presumed due to vagal injury) and small bowel dysmotility complicated by unintentional 40lb weight loss over the past year and now severe malnutrition. Previously intolerant to oral food intake due to nausea. Was initially admitted for portacath and TPN initiation since 2/13. After extubation has been able to tolerate feeds without n/v from 2/20. Electrolytes trending towards baseline today.  Per GI, next steps for workup for malnutrition would include CT enterography to evaluate for anatomic abnormalities contributing to malnutrition vs possible treatment for SIBO (though patient has had multiple courses of treatment in the past with no long term improvement). Patient couldn't tolerate the oral load for CT enterography on 2/21, so will defer this until she is able to tolerate greater PO load. On 2/23 , again discussed with patient the need of small bowel motility study if not improving outpatient through [place]. No ongoing concerns for SIBO on 2/23 as patient is passing multiple episodes of stools and gas with no abdominal pain or distention. C-diff test negative on 2/21. Per RD, patient tolerating >300 kcal of intake PO currently, so stopped trickle Tube feeds and continuing with PO and TPN for nutrition.     - Regular diet + increased TPN +  stopping feeds TF  per RD & transplant pul discussion               - Nephrology: limit fluid < 1.5 L per day for TPN ( chart vol of TPN in the I/O)   - RD concerned pt is not absorbing any oral intake and they will be monitoring Bowel function, I/O and, PO/TF/TPN intake.   -  stopped tube feeds for now with >300 kcal in PO intake, and monitor per discussion with transplant pulm, continue with TPN for majority of nutrition needs   - Continue calorie count  - CT enterography with contrast- Pt not able to tolerate  on 2/21 No ongoing concerns for SIBO, would need small bowel motility study if not improving outpatient through [place]  - Daily Weights  - CMP in the AM for refeeding syn/ Blood gases       # Respiratory acidosis  # Acute hypoxic and hypercarbic respiratory failure  # S/P Lung transplant 2022  # Recurrent pneumonia     Patient received a bilateral lung transplant 6/28/22 for COPD. Was admitted 2/10 to address malnutrition   and admitted to ICU on 2/17 with hypoxic hypercarbic respiratory failure. Intubated on 2/18 AM  due to worsening oxygen requirements, likely due to pulmonary edema given hx of ESRD requiring HD vs infection given hx of lung transplant, immunosuppressed status, and recurrent pneumonias. Extubated on 2/19 without complications,   Micro Follow-up on BAL, viral panel, CMV, fungus, and Blood Cultures show no abnormalities. Does have slowly rising pCO2 on VBG - on trial of HFNC as she does not tolerate BiPAP mask due to claustrophobia to see if PEEP from HFNC can help improve ventilation. Will need overnight oximetry testing per transplant pulm team prior to discharge and follow up in sleep clinic outpatient.     - PRN hypertonic saline inhaler with albuterol nebs  - Continue good pulm toilet  - Transplant pulmonology following, recs appreciated  - PTA immunosuppressive agent  >Prednisone 5 mg every morning, 2.5 mg every afternoon; tacrolimus 4 mg every morning, 4mg every afternoon  > Monitor tacro levels, goal 7-9  > will need overnight oximetry testing prior to discharge and likely follow up in sleep clinic  - PTA dapsone MWF for PJP prophylaxis  - last dose of zosyn for empiric HAP course (2/17 - 2-23)  - Initial decompensation likely from opioids - limit medications that would depress respiration   - port culture per transplant ID rec- pending      Antibiotics:    Vancomycin (2/17 - 2/17)  Zosyn (2/17 - 2/23)  Dapsone MWF, PJP ppx   Micafungin (2/18- 2/21)    Immunosuppression  Tacrolimus  Prednisone          # Steal physiology of LUE dialysis access fistula  LUE arterial US obtained 2/21 with concern for LUE edema. US of fistula demonstrated steal physiology. Discussed with nephrology, and vascular surgery consulted on 2/22. Vascular surgery has only minor concerns for steal symptoms and given that the AVF is working well, they are okay with outpatient fistulograms/ venoplasty with wrist brachial index and PPG's, unless new concerns arise.  - plan for outpatient workup as above; nephrology in agreement with outpatient workup.        # Hypoalbuminemia  # Left upper extremity unilateral edema  # Bilateral pedal and ankle edema  Edema due to third spacing due to hypoalbuminemia vs HF. BNP >00372 at admission, Echo on 2/18 shows EF of 55-60% with IVC of <2.1 and collapsing >50% with sniff, normal RA pressure, no significant valvular abnormalities ;  Left upper extremity swelling and  pitting lower extremity edema likely due to hypoalbuminemia improved today. Upper limb duplex USG on 2/18 negative for DVT. Wt went from 43.4 kg on admission to 47.4 kg today. She is urinating but cannot chart as she usually urinates with BM. She reports trending to baseline with urination. She denies dysuria, retention symptoms. USG left arm on 2/21 shows steel physiology and Vascular Surgery consulted. Vascular surgery has only minor concerns for steal symptoms and given that the AVF is working well, they are okay with outpatient fistulograms/ venoplasty with wrist brachial index and PPG's, unless new concerns arise. LUE and LE edema significantly decreased since yesterdays HD. No new tingling/ numbness noticed.  - Continue daily weights  - Strict I/Os  - Elevation and wrapping of only lower legs; no wrapping of Left upper extremity with dialysis fistula        # ESRD on HD T/Th/Sat  # Acute on chronic Anemia 2/2 ESRD  # Hypervolemic hyponatremia  # Anion gap metabolic acidosis - resolved  Patient is ESRD on T/Th/Sat HD, Hgb  stabilizing. HD tolerating well. Continuing monitoring electrolytes daily. Anion gap normal since 2/21. Will continue to monitor daily labs/ ABG  for refeeding syndrome and acidosis  - Continue Venofer injections    - CBC and CMP daily  - Continue epogen dose 8000 units as per nephrology  - Strict I/Os  - HD T/TH/Sat per nephrology      # Facial and neck bruising  # Pain  Reports soreness in her neck from lying in bed. No soreness in the buttocks/ back. Patient has multiple bruises over her arms and face which is attributed to previous falls. No new bruising reported today. Patients reports her headaches are improving with tylenol. Using heating pads and lidocaine patch for body pains. Couple of small skin tears noted by the Rn's. Skin care done accordingly.  - Tylenol Q4  - Lidocaine patch prn  - Heating pads prn  -Diligent skin cares      # HTN  # A-fib, resolved  Noted atrial fibrillation on arrival with HR elevated at 150, not sustained for > 10 minutes and otherwise hemodynamically stable. Rates stable in the 80's. BP normalizing since 2/22.  - PTA metoprolol 25mg BID   - Continuous telemetry      # Encephalopathy secondary to hypercarbia - resolved    Patient was progressively more lethargic on 2/17 during admission in [facility]. Etiology likely secondary to hypercarbia in context of respiratory failure as patient was noted to have high CO2s concomitant with lethargy. Though receiving oxycodone and fentanyl, lethargy was only partially alleviated by narcan. LFTs and ammonia wnl with low suspicion of hepatic encephalopathy. BUN wnl with low suspicion for uremic encephalopathy. Head CT on 2/18 was negative with low suspicion of acute intracranial pathology. Patient is awake, alert, oriented and following commands since 2/20.         # Right hip fracture s/p ORIF (December 2023)   - PT/OT    # GERD  - PTA PPI    # Low T4  Patient with new low T4 at 0.64 and TSH at 6.5, concerning for new hypothyroidism  vs sick thyroid syndrome. TSH with appropriate response, more consistent with elevation in the setting of acute illness, levothyroxine is not indicated at this time, will monitor for symptom development. Consider repeat testing in outpatient setting once patient no longer acutely ill.       # Hx EBV viremia   # Hypogammaglobulinemia  # Chronic immunosuppression  Patient has been afebrile and has not had leukocytosis however she is under immunosuppression. Given acute respiratory failure and hx of recurrent pneumonias, she was started on empiric abx for concerns over new pneumonia. RVP and cultures no growth to date. Last day of empirical treatment for HAP.  - EBV ordered - 27K (decreased compared to prior)         Lines/tubes/drains:  - CVC RIJ (for TPN, is tunneled line)   - PIV x2  - PEG/J  - HD AV fistula, left arm        Diet:   parenteral nutrition - ADULT compounded formula at 43 mL/hr    Regular Diet Adult:    Calorie Counts starting    Adult Formula Drip Feeding: Continuous [employer] Peptide 1.5; Jejunostomy; Goal Rate: Begin at 10 mL/hr and hold; mL/hr; Do not advance tube feeding rate unless K+ is = or > 3.0, Mg++ is = or > 1.5, and Phos is = or > 1.9       General Cares/Prophylaxis:    DVT Prophylaxis: Heparin SQ  GI Prophylaxis: PPI  Fluids: None  Code Status: Full    Clinically Significant Risk Factors              # Hypoalbuminemia: Lowest albumin = 2.6 g/dL at 2/18/2024  5:13 AM, will monitor as appropriate             # Severe Malnutrition: based on nutrition assessment    # Financial/Environmental Concerns: none                Disposition Plan:      Expected Discharge Date: 2/26/24  Destination: TCU/ home with help/services  Discharge Comments:           The patient's care was discussed with the Attending Physician, Dr. Jayne Escobar MD     DP, Medical Observer  Medicine Service, [facility] TEAM [address] [place] [address]  Securely message with [facility] (more info)  Text  "page via [facility] Paging/Directory   See signed in provider for up to date coverage information        Resident/Fellow Attestation   I, Jannet Isaac MD, was present with the medical student who participated in the service and in the documentation of the note.  I have verified the history and personally performed the physical exam and medical decision making.  I agree with the assessment and plan of care as documented in the note.  Note was edited as needed for accuracy and clarity.    Jannet Isaac MD  PGY3  Date of Service (when I saw the patient): 02/23/24       _________________        Interval History  No acute events since yesterday. Pt reports improvement in cough frequency. Tolerating feeds since 2/20 without n/v. Continues to tolerate TPN. Her left hand and arm swelling has been decreasing since stopping dialysis yesterday. Patients reports her headaches are improving with tylenol. Her diarrhea is improving and took imodium and Zofran for it. She started using HFNC last night intermittently for elevated pCO2. She denies no Increased in SOB, cough, chest pain, fever, chills. Overall, she feels better after HD yesterday.     Physical Exam  /57 (BP Location: Right arm, Patient Position: Left side, Cuff Size: Adult Small)   Pulse 85   Temp 97  F (36.1  C) (Core)   Resp 18   Ht 1.57 m (5' 1.81\")   Wt 49.1 kg (108 lb 3.9 oz)   SpO2 95%   BMI 19.92 kg/m         General: thin, alert and oriented  HEENT: Normocephalic, bruising on the right face around right orbit with hematoma and right neck.  Neuro: NAD, following commands, a&o  Pulm/Resp: clear lungs, no wheezing or crackles, able to clear secretions, no cough during exam  CV: RRR, warm extremities, 2+ pitting edema in left hand, no edema in right arm or hand, and 1+ pitting edema from bilateral ankles to feet  Abdomen: Non-distended, PEG in place without erythema or drainage  Incisions/Skin: Bruising to face and right forearm.     Labs and " Imaging for this encounter reviewed in chart review.

## 2024-02-24 LAB
ALBUMIN SERPL BCG-MCNC: 3.1 G/DL (ref 3.5–5.2)
ALP SERPL-CCNC: 66 U/L (ref 40–150)
ALT SERPL W P-5'-P-CCNC: 11 U/L (ref 0–50)
ANION GAP SERPL CALCULATED.3IONS-SCNC: 12 MMOL/L (ref 7–15)
AST SERPL W P-5'-P-CCNC: 25 U/L (ref 0–45)
BASE EXCESS BLDV CALC-SCNC: -1.1 MMOL/L (ref -3–3)
BASOPHILS # BLD AUTO: ABNORMAL 10*3/UL
BASOPHILS # BLD MANUAL: 0 10E3/UL (ref 0–0.2)
BASOPHILS NFR BLD AUTO: ABNORMAL %
BASOPHILS NFR BLD MANUAL: 0 %
BILIRUB SERPL-MCNC: 0.2 MG/DL
BUN SERPL-MCNC: 46.6 MG/DL (ref 8–23)
CALCIUM SERPL-MCNC: 8.8 MG/DL (ref 8.8–10.2)
CHLORIDE SERPL-SCNC: 105 MMOL/L (ref 98–107)
CREAT SERPL-MCNC: 2.94 MG/DL (ref 0.51–0.95)
DEPRECATED HCO3 PLAS-SCNC: 24 MMOL/L (ref 22–29)
EGFRCR SERPLBLD CKD-EPI 2021: 17 ML/MIN/1.73M2
EOSINOPHIL # BLD AUTO: ABNORMAL 10*3/UL
EOSINOPHIL # BLD MANUAL: 0.1 10E3/UL (ref 0–0.7)
EOSINOPHIL NFR BLD AUTO: ABNORMAL %
EOSINOPHIL NFR BLD MANUAL: 2 %
ERYTHROCYTE [DISTWIDTH] IN BLOOD BY AUTOMATED COUNT: 21.1 % (ref 10–15)
GLUCOSE SERPL-MCNC: 114 MG/DL (ref 70–99)
HCO3 BLDV-SCNC: 28 MMOL/L (ref 21–28)
HCT VFR BLD AUTO: 27.7 % (ref 35–47)
HGB BLD-MCNC: 8.2 G/DL (ref 11.7–15.7)
IMM GRANULOCYTES # BLD: ABNORMAL 10*3/UL
IMM GRANULOCYTES NFR BLD: ABNORMAL %
LYMPHOCYTES # BLD AUTO: ABNORMAL 10*3/UL
LYMPHOCYTES # BLD MANUAL: 1.1 10E3/UL (ref 0.8–5.3)
LYMPHOCYTES NFR BLD AUTO: ABNORMAL %
LYMPHOCYTES NFR BLD MANUAL: 19 %
MAGNESIUM SERPL-MCNC: 2 MG/DL (ref 1.7–2.3)
MCH RBC QN AUTO: 36.8 PG (ref 26.5–33)
MCHC RBC AUTO-ENTMCNC: 29.6 G/DL (ref 31.5–36.5)
MCV RBC AUTO: 124 FL (ref 78–100)
METAMYELOCYTES # BLD MANUAL: 0.1 10E3/UL
METAMYELOCYTES NFR BLD MANUAL: 2 %
MONOCYTES # BLD AUTO: ABNORMAL 10*3/UL
MONOCYTES # BLD MANUAL: 0.7 10E3/UL (ref 0–1.3)
MONOCYTES NFR BLD AUTO: ABNORMAL %
MONOCYTES NFR BLD MANUAL: 12 %
MYELOCYTES # BLD MANUAL: 0.1 10E3/UL
MYELOCYTES NFR BLD MANUAL: 2 %
NEUTROPHILS # BLD AUTO: ABNORMAL 10*3/UL
NEUTROPHILS # BLD MANUAL: 3.3 10E3/UL (ref 1.6–8.3)
NEUTROPHILS NFR BLD AUTO: ABNORMAL %
NEUTROPHILS NFR BLD MANUAL: 59 %
NRBC # BLD AUTO: 0.1 10E3/UL
NRBC # BLD AUTO: 0.1 10E3/UL
NRBC BLD AUTO-RTO: 1 /100
NRBC BLD MANUAL-RTO: 1 %
O2/TOTAL GAS SETTING VFR VENT: 1 %
OXYHGB MFR BLDV: 74 % (ref 70–75)
PCO2 BLDV: 79 MM HG (ref 40–50)
PH BLDV: 7.16 [PH] (ref 7.32–7.43)
PHOSPHATE SERPL-MCNC: 4.7 MG/DL (ref 2.5–4.5)
PLAT MORPH BLD: ABNORMAL
PLATELET # BLD AUTO: 269 10E3/UL (ref 150–450)
PO2 BLDV: 47 MM HG (ref 25–47)
POLYCHROMASIA BLD QL SMEAR: SLIGHT
POTASSIUM SERPL-SCNC: 3.8 MMOL/L (ref 3.4–5.3)
PROMYELOCYTES # BLD MANUAL: 0.2 10E3/UL
PROMYELOCYTES NFR BLD MANUAL: 4 %
PROT SERPL-MCNC: 5.2 G/DL (ref 6.4–8.3)
RBC # BLD AUTO: 2.23 10E6/UL (ref 3.8–5.2)
RBC MORPH BLD: ABNORMAL
SAO2 % BLDV: 77.2 % (ref 70–75)
SODIUM SERPL-SCNC: 141 MMOL/L (ref 135–145)
TACROLIMUS BLD-MCNC: 7 UG/L (ref 5–15)
TME LAST DOSE: NORMAL H
TME LAST DOSE: NORMAL H
VIT C SERPL-MCNC: 14 UMOL/L
WBC # BLD AUTO: 5.6 10E3/UL (ref 4–11)

## 2024-02-24 PROCEDURE — 250N000009 HC RX 250

## 2024-02-24 PROCEDURE — 36591 DRAW BLOOD OFF VENOUS DEVICE: CPT | Performed by: INTERNAL MEDICINE

## 2024-02-24 PROCEDURE — 250N000013 HC RX MED GY IP 250 OP 250 PS 637: Performed by: INTERNAL MEDICINE

## 2024-02-24 PROCEDURE — 999N000157 HC STATISTIC RCP TIME EA 10 MIN

## 2024-02-24 PROCEDURE — 94681 O2 UPTK CO2 OUTP % O2 XTRC: CPT

## 2024-02-24 PROCEDURE — 36415 COLL VENOUS BLD VENIPUNCTURE: CPT | Performed by: INTERNAL MEDICINE

## 2024-02-24 PROCEDURE — 634N000001 HC RX 634: Mod: JZ | Performed by: STUDENT IN AN ORGANIZED HEALTH CARE EDUCATION/TRAINING PROGRAM

## 2024-02-24 PROCEDURE — 258N000003 HC RX IP 258 OP 636: Performed by: STUDENT IN AN ORGANIZED HEALTH CARE EDUCATION/TRAINING PROGRAM

## 2024-02-24 PROCEDURE — 90935 HEMODIALYSIS ONE EVALUATION: CPT | Performed by: STUDENT IN AN ORGANIZED HEALTH CARE EDUCATION/TRAINING PROGRAM

## 2024-02-24 PROCEDURE — 90935 HEMODIALYSIS ONE EVALUATION: CPT

## 2024-02-24 PROCEDURE — 250N000012 HC RX MED GY IP 250 OP 636 PS 637: Performed by: INTERNAL MEDICINE

## 2024-02-24 PROCEDURE — 85027 COMPLETE CBC AUTOMATED: CPT

## 2024-02-24 PROCEDURE — 250N000011 HC RX IP 250 OP 636: Performed by: INTERNAL MEDICINE

## 2024-02-24 PROCEDURE — 250N000011 HC RX IP 250 OP 636: Performed by: STUDENT IN AN ORGANIZED HEALTH CARE EDUCATION/TRAINING PROGRAM

## 2024-02-24 PROCEDURE — 250N000013 HC RX MED GY IP 250 OP 250 PS 637: Performed by: STUDENT IN AN ORGANIZED HEALTH CARE EDUCATION/TRAINING PROGRAM

## 2024-02-24 PROCEDURE — 250N000013 HC RX MED GY IP 250 OP 250 PS 637

## 2024-02-24 PROCEDURE — 250N000011 HC RX IP 250 OP 636

## 2024-02-24 PROCEDURE — 84100 ASSAY OF PHOSPHORUS: CPT

## 2024-02-24 PROCEDURE — 80053 COMPREHEN METABOLIC PANEL: CPT | Performed by: INTERNAL MEDICINE

## 2024-02-24 PROCEDURE — 94762 N-INVAS EAR/PLS OXIMTRY CONT: CPT

## 2024-02-24 PROCEDURE — 99232 SBSQ HOSP IP/OBS MODERATE 35: CPT | Mod: GC | Performed by: STUDENT IN AN ORGANIZED HEALTH CARE EDUCATION/TRAINING PROGRAM

## 2024-02-24 PROCEDURE — 85007 BL SMEAR W/DIFF WBC COUNT: CPT

## 2024-02-24 PROCEDURE — 250N000009 HC RX 250: Performed by: STUDENT IN AN ORGANIZED HEALTH CARE EDUCATION/TRAINING PROGRAM

## 2024-02-24 PROCEDURE — 83735 ASSAY OF MAGNESIUM: CPT

## 2024-02-24 PROCEDURE — 99233 SBSQ HOSP IP/OBS HIGH 50: CPT | Mod: 24 | Performed by: INTERNAL MEDICINE

## 2024-02-24 PROCEDURE — 120N000002 HC R&B MED SURG/OB UMMC

## 2024-02-24 PROCEDURE — 82805 BLOOD GASES W/O2 SATURATION: CPT | Performed by: INTERNAL MEDICINE

## 2024-02-24 PROCEDURE — 80197 ASSAY OF TACROLIMUS: CPT | Performed by: INTERNAL MEDICINE

## 2024-02-24 RX ADMIN — PREDNISONE 5 MG: 5 TABLET ORAL at 12:39

## 2024-02-24 RX ADMIN — HEPARIN SODIUM 500 UNITS/HR: 1000 INJECTION INTRAVENOUS; SUBCUTANEOUS at 08:39

## 2024-02-24 RX ADMIN — LIDOCAINE AND PRILOCAINE: 25; 25 CREAM TOPICAL at 07:01

## 2024-02-24 RX ADMIN — CALCIUM CARBONATE 600 MG (1,500 MG)-VITAMIN D3 400 UNIT TABLET 1 TABLET: at 18:49

## 2024-02-24 RX ADMIN — TACROLIMUS 4.5 MG: 5 CAPSULE ORAL at 18:49

## 2024-02-24 RX ADMIN — CYANOCOBALAMIN TAB 500 MCG 500 MCG: 500 TAB at 12:39

## 2024-02-24 RX ADMIN — SODIUM CHLORIDE 300 ML: 9 INJECTION, SOLUTION INTRAVENOUS at 08:24

## 2024-02-24 RX ADMIN — EPOETIN ALFA-EPBX 8000 UNITS: 10000 INJECTION, SOLUTION INTRAVENOUS; SUBCUTANEOUS at 10:12

## 2024-02-24 RX ADMIN — ONDANSETRON 4 MG: 2 INJECTION INTRAMUSCULAR; INTRAVENOUS at 14:00

## 2024-02-24 RX ADMIN — ONDANSETRON 4 MG: 2 INJECTION INTRAMUSCULAR; INTRAVENOUS at 22:03

## 2024-02-24 RX ADMIN — Medication 40 MG: at 21:53

## 2024-02-24 RX ADMIN — TACROLIMUS 4 MG: 5 CAPSULE ORAL at 12:40

## 2024-02-24 RX ADMIN — LOPERAMIDE HYDROCHLORIDE 2 MG: 2 CAPSULE ORAL at 22:22

## 2024-02-24 RX ADMIN — ACETAMINOPHEN 975 MG: 325 TABLET, FILM COATED ORAL at 21:54

## 2024-02-24 RX ADMIN — HEPARIN SODIUM 5000 UNITS: 5000 INJECTION, SOLUTION INTRAVENOUS; SUBCUTANEOUS at 00:01

## 2024-02-24 RX ADMIN — ACETAMINOPHEN 975 MG: 325 TABLET, FILM COATED ORAL at 12:38

## 2024-02-24 RX ADMIN — MAGNESIUM SULFATE HEPTAHYDRATE: 500 INJECTION, SOLUTION INTRAMUSCULAR; INTRAVENOUS at 18:49

## 2024-02-24 RX ADMIN — METOPROLOL TARTRATE 25 MG: 100 TABLET, FILM COATED ORAL at 12:40

## 2024-02-24 RX ADMIN — PREDNISONE 2.5 MG: 2.5 TABLET ORAL at 21:54

## 2024-02-24 RX ADMIN — CALCIUM CARBONATE 600 MG (1,500 MG)-VITAMIN D3 400 UNIT TABLET 1 TABLET: at 12:39

## 2024-02-24 RX ADMIN — HEPARIN SODIUM 5000 UNITS: 5000 INJECTION, SOLUTION INTRAVENOUS; SUBCUTANEOUS at 12:39

## 2024-02-24 RX ADMIN — PIPERACILLIN AND TAZOBACTAM 2.25 G: 2; .25 INJECTION, POWDER, FOR SOLUTION INTRAVENOUS at 02:09

## 2024-02-24 RX ADMIN — LIDOCAINE 4% 1 PATCH: 40 PATCH TOPICAL at 21:55

## 2024-02-24 RX ADMIN — POTASSIUM & SODIUM PHOSPHATES POWDER PACK 280-160-250 MG 1 PACKET: 280-160-250 PACK at 12:39

## 2024-02-24 RX ADMIN — LOPERAMIDE HYDROCHLORIDE 2 MG: 2 CAPSULE ORAL at 07:01

## 2024-02-24 RX ADMIN — LOPERAMIDE HYDROCHLORIDE 2 MG: 2 CAPSULE ORAL at 15:01

## 2024-02-24 RX ADMIN — SODIUM CHLORIDE 250 ML: 9 INJECTION, SOLUTION INTRAVENOUS at 08:23

## 2024-02-24 ASSESSMENT — ACTIVITIES OF DAILY LIVING (ADL)
ADLS_ACUITY_SCORE: 36

## 2024-02-24 NOTE — PROGRESS NOTES
"Hemodialysis Progress Note    I personally reviewed the vital signs, medications, labs, and imaging.  Subjective: I examined the patient during dialysis. Patient tolerated iHD run without issues.  (acceptable). Tolerating UF with goal of approx 3.5L off today. Has TPN running but reports at reduced rate as her PO intake is starting to increase a bit. She reports improvement in LUE swelling (AVF arm). BP looks great at the time of my eval    Objective:  /67   Pulse 86   Temp 98.3  F (36.8  C) (Oral)   Resp 20   Ht 1.57 m (5' 1.81\")   Wt 49.1 kg (108 lb 3.9 oz)   SpO2 100%   BMI 19.92 kg/m      Intake/Output Summary (Last 24 hours) at 2/24/2024 1023  Last data filed at 2/24/2024 0659  Gross per 24 hour   Intake 794 ml   Output --   Net 794 ml         Problem list & Plan  # ESKD on Dialysis TThSat  -Diagnosis: ESKD on iHD  -Continue with TTSat routine iHD sessions  -Left AVF  -- No obvious issues on exam    # MBD  - Calcium 8.8  - Phos 4.7  -- On Calcium carbonate 600mg TID w/ meals    # Anemia of Renal Disease  - Hemoglobin 8.2  -- On EPO 8K with iHD sessions    Yung Sorto MD  Date of Service (when I saw the patient): February 24, 2024    "

## 2024-02-24 NOTE — PLAN OF CARE
Goal Outcome Evaluation:      Plan of Care Reviewed With: patient          Outcome Evaluation: Patient denies pain.  Up to bedside comode frequently with loose stool.  Immodium given with some relief.    A:  TPN infusing to right port a cath.  Antibiotics infusing to Right PIV.  Having nausea at 2300 but unable to get Zofran yet.  MD updated and compazine ordered but patient states nausea passed before order verified so dose held. Overnight oximetry study during night.  Desat frequently on RA and o2 started at 0100.  Continue to desat frequently and o2 increased to 2L .  R:  continue to monitor and treat per plan of care.

## 2024-02-24 NOTE — PROGRESS NOTES
Calorie Count  Intake recorded for: 2/23  Total Kcals: 1539 Total Protein: 52g  Kcals from Hospital Food: 1539   Protein: 52g  Kcals from Outside Food (average):0 Protein: 0g  # Meals Ordered from Kitchen: 4  # Meals Recorded: 3 meals recorded  First - 100% omelet with ham, cheese, and nino  Second - 100% cream of wheat with 6 sugar packets, 2 pieces of white toast with jelly and 4 butters, fresh fruit cup  Third - 100% ice cream, 50% pepperoni pizza  # Supplements Recorded: 0

## 2024-02-24 NOTE — PROGRESS NOTES
Lung Transplant Consult Follow Up Note   February 24, 2024            Assessment and Plan:   Sofie Rodriguez is a 63 year old female with h/o bilateral lung transplant for COPD on 6/28/22 with course complicated by post-operative hemidiaphragm palsy, recurrent PNAs, positive DSA, EBV viremia, hypogammaglobulinemia, severe gastroparesis s/p G/J tube placement 7/27/22 with pyloric botox 1/25/23, GI bleed 2/2 pyloric ulcer, hemobilia s/p ERCP and MRCP, chronic diarrhea, recurrent C diff colitis, and ESRD on HD.   Admitted May 2023 for FTT.  Admitted to OSH 12/4-12/31 in December for right hip fracture s/p ORIF, now with significant deconditioning and ongoing severe malnutrition with gastroparesis, small bowel hypermotility and failure to tolerate any tube feeds.   After extensive discussion with the dietician, GI and ultimately convincing the patient, the patient was admitted on 2/10 for initiation of TPN/lipids. She is s/p port placement with persistent pain at port site. GI recommends trickle feeds for maintaining bowel (stopped with increased PO intake) and CT enterography to look for structural abnormalities (unable due to large bolus enteral contrast required for the study). She was transferred to the ICU on 2/17 with worsening mental status, worsening respiratory acidosis despite Bpap use, ultimately requiring intubation and mechanical ventilation. CT chest concerning for new infection vs volume overload and started on empiric antimicrobial therapy (micafungin, zosyn, vanco) however extubated 2/19 and infectious work up is negative to date. Persistent CO2 retention. Tolerating TPN and moderate PO intake.     Recommendations:   - Metabolic cart study to evaluate rising PCO2  - Recheck tacrolimus level (ordered)  - VBG in AM (Ordered)  - Electrolyte replacement as per primary team to manage patients probable refeeding syndrome  - Strict I/Os and daily weights,  - O2 to keep SaO2 > 92%  - Prospera 2/18 (pending)        S/p bilateral lung transplant:   Right hemidiaphragm palsy:   Suspected CARLEE:  New consolidated nodular opacities and GGO concerning for infection:  Severely deconditioned. CMV 2/7 negative. ImmuKnow 108 and cfDNA 0.12 on 1/10. CT 2/7 with decreased MARLON opacities, new tree in bud RLL opacities without new symptoms. PFTs unchanged in clinic (but ATS not met), remain significantly below her baseline (1.4-1.5L). Noted increased dyspnea since the Port-A-Cath placement.  Review of notes indicate patient should be on 2 L o2 at night from prior overnight O2 study in Jan 2023. Also reported a slight increase in cough. She was requiring O2 for comfort only but decompensated on 2/17 with worsening encephalopathy, respiratory acidosis (pCO2 up to 106). CT chest 2/17 showed very patchy and new consolidative, nodular opacities, GGO primarily in the bases and intralobular septal thickening concerning for pulmonary edema and volume overload along with new, possibly fungal vs. Atypical infection vs. PTLD (less likely but in the differential) vs. Septic emboli.  Based on negative infectious work up at this time, would favor volume overload in the setting of initiation of TPN. Continued clinical improvement with increased nutrition an dialysis except ongoing CO2 retention.   - 2/24 VBG 7.16/79 (asymptomatic and patient adamantly refusing BiPAP).   - O/N oximetry, reviewed by me, 10:14 (min:sec) < 88% (O2 initiated at 0046, increased to 2L at 3:40).  Frequent desat, pattern suggests sleep apnea, needs formal seep study, may be contributing to increased PCO2 with decreased resp drive from chronic elevated PCO2.  - Check metabolic cart to assess etiology of increasing CO2.  VBG in AM.  - bronch with lavage of RML 2/18 showed very friable tissue  - 2/18 bronch cultures with candida glabrata (likely colonization) but otherwise NGTD.  - 2/18 Galactomannan (-)  - 2/18 Fungitell (-)  - Complete 7 days of zosyn and then stop if stable  -  Micafungin 2/18-2/21,no evidence of fungus so stopped.  - DSA 2/7 with persistent DQ B2, MFI increased to 6966, see below    - Prospera to evaluate significance of positive DSA (2/18 pending )  - schedule follow up with sleep to discuss possible CPAP given recommendations from August sleep study  - appreciate strict I&O and volume management as per primary and nephrology     Immunosuppression:  - Tacrolimus toal level 7-9. 10 hour levels on 2/23 (supratherapeutic) and on 10/24 (subtherapeutic).  12 hour trough scheduled for 10/15 (ordered)  - Await Prospera (pending from 2/18) for consideration of IS regimen alterations  - Continue Prednisone 5 mg/2.5 mg     Prophylaxis:   - Dapsone 50 mg q MWF for PJP ppx     Positive DSA: no prior treatment for AMR, has been watched for quite some time given no pulmonary symptoms, prior PFTs stability and significant and ongoing failure to thrive. Last DSA improved to 5723, however will ongoing lower PFTs, may need to consider AMR treatment, however, unclear how she would tolerate.   -  DSA 2/7 with persistent DQ B2, MFI increased to 6966  - Prospera to evaluate significance of positive DSA (pending 2/18)     ESRD: Dialysis dependent.  Suspect her respiratory symptoms were volume overload secondary to initiation of TPN.  CT chest suggesting volume overload. Has undergone multiple days in a row of dialysis to try to pull fluid  -Dialysis as per nephrology.  -Monitor strict ins and outs      EBV viremia: last level of 96K (2/7), CT c/a/p (2/7) without adenopathy.  - EBV 2/18 = 88140.    - CMV 2/19 (-)     Acute metabolic encephalopathy - resolved     Severe protein calorie malnutrition:  FTT:   Gastroparesis s/p PEG/J s/p botox and G-POEM:   SB Hypomotility  Pyloric ulcer:  Chronic nausea and osmotic diarrhea:  SIBO s/p rifaximin:   Recurrent C diff colitis: chronic diarrhea since transplant with recurrent episodes of C diff. GI previously consulted, felt stools consistent with  osmotic diarrhea. Over the last year has lost 40 lbs, unable to tolerate any combination of TF, most recently elemental formula. Normal fecal elastase last May. Following with Dr. Navarro who feels her main two issues are vagal injury induced gastroparesis and probably small bowel hypomotility. Her intolerance to J-tube feeds suggests the latter to be a significant issue (notes in a message that there are no motility experts at the  and he is limited in what can be offered). Now s/p port placement with TPN and lipids. GI recommending CT to assess for structural issues. Now taking modest PO.  -close monitoring as patient is high risk for refeeding syndrome  -Recs per primary and GI  -Concern that patient will not tolerate CT enterography according to chart notes as she will require 1500 mL enteral contrast bolus which she is unlikely to tolerate given her gastroparesis and reduced bowel function.  - OK to hold trickle TF if patient is tolerating PO consistently.     We appreciate the excellent care provided by the ICU team.    Will continue to follow along closely, please do not hesitate to call with any questions or concerns.         Ajay Castillo MD  [phone]          Interval History:   No events overnight. Port pain improving  Dyspnea at rest: None  Dyspnea on exertion: more limited by weakness than dyspnea  Cough: occasional, no change  Sputum: small amt swallowed  Hemoptysis: none   Chest Pain: None           Review of Systems:   C: NEGATIVE for fever, chills  INTEGUMENTARY/SKIN: no rash or obvious new lesions  ENT/MOUTH: no sore throat, new sinus pain or nasal drainage  RESP: see interval history  CV: NEGATIVE for chest pain, palpitations. Decreased LE edema  GI: abd pain and nasuea after eating. Persistent frequent loose stool  : dialysisia  MUSCULOSKELETAL: no myalgias, arthralgias            Medications:      calcium carbonate-vitamin D  1 tablet Per J Tube TID w/meals    cyanocobalamin  500 mcg Per  Feeding Tube Daily    dapsone  50 mg Per J Tube Q Mon Wed Fri AM    epoetin tre-epbx  8,000 Units Intravenous Once in dialysis/CRRT    heparin ANTICOAGULANT  5,000 Units Subcutaneous Q12H    lidocaine  1 patch Transdermal Q24h    lipids plant base  250 mL Intravenous Once per day on Monday Wednesday Friday    metoprolol  25 mg Per J Tube BID    - MEDICATION INSTRUCTIONS -   Does not apply Once    pantoprazole  40 mg Per J Tube BID    potassium & sodium phosphates  1 packet Oral or Feeding Tube BID    predniSONE  5 mg Per J Tube QAM    And    predniSONE  2.5 mg Per J Tube QPM    [Held by provider] sevelamer carbonate (RENVELA)  0.8 g Oral BID    sodium chloride 0.9%  250 mL Intravenous Once in dialysis/CRRT    sodium chloride 0.9%  300 mL Hemodialysis Machine Once    tacrolimus  4 mg Per J Tube QAM    And    tacrolimus  4.5 mg Per J Tube QPM     acetaminophen, albumin human, albuterol, calcium carbonate, dextrose, dextrose, glucose **OR** dextrose **OR** glucagon, lidocaine (buffered or not buffered), lidocaine (buffered or not buffered), lidocaine (buffered or not buffered), lidocaine-prilocaine, loperamide, naloxone **OR** naloxone **OR** naloxone **OR** naloxone, ondansetron **OR** ondansetron, senna-docusate **OR** senna-docusate, sodium chloride, sodium chloride (PF), sodium chloride 0.9%, sodium chloride 0.9%, - MEDICATION INSTRUCTIONS -         Physical Exam:   Temp:  [97.9  F (36.6  C)-98.7  F (37.1  C)] 98.1  F (36.7  C)  Pulse:  [90-95] 91  Resp:  [16-20] 16  BP: (121-128)/(65-68) 128/67  SpO2:  [94 %-97 %] 97 %    Intake/Output Summary (Last 24 hours) at 2/24/2024 0741  Last data filed at 2/24/2024 0659  Gross per 24 hour   Intake 1194 ml   Output --   Net 1194 ml     Examined on dialysis    Constitutional:   Awake, alert and in no apparent distress     Eyes:   nonicteric     ENT:    oral mucosa moist without lesions     Neck:   Supple without supraclavicular or cervical lymphadenopathy     Lungs:    Mildly diminished  air flow.  No crackles. No rhonchi.  No wheezes.     Cardiovascular:   Normal S1 and S2.  RRR.  No murmur. No gallop. No rub.     Abdomen:   NABS, soft, nondistended, nontender.  No HSM.     Musculoskeletal:   1+ BLE edema. 3+ LUE edema     Neurologic:   Alert and conversant.     Skin:   Warm, dry.  No rash on limited exam.             Data:   All laboratory and imaging data reviewed.    Results for orders placed or performed during the hospital encounter of 02/10/24 (from the past 24 hour(s))   Magnesium   Result Value Ref Range    Magnesium 1.9 1.7 - 2.3 mg/dL   Phosphorus   Result Value Ref Range    Phosphorus 3.7 2.5 - 4.5 mg/dL   CBC with Platelets & Differential    Narrative    The following orders were created for panel order CBC with Platelets & Differential.  Procedure                               Abnormality         Status                     ---------                               -----------         ------                     CBC with platelets and d...[[id number]]  Abnormal            Preliminary result           Please view results for these tests on the individual orders.   Magnesium   Result Value Ref Range    Magnesium 2.0 1.7 - 2.3 mg/dL   Phosphorus   Result Value Ref Range    Phosphorus 4.7 (H) 2.5 - 4.5 mg/dL   Comprehensive metabolic panel   Result Value Ref Range    Sodium 141 135 - 145 mmol/L    Potassium 3.8 3.4 - 5.3 mmol/L    Carbon Dioxide (CO2) 24 22 - 29 mmol/L    Anion Gap 12 7 - 15 mmol/L    Urea Nitrogen 46.6 (H) 8.0 - 23.0 mg/dL    Creatinine 2.94 (H) 0.51 - 0.95 mg/dL    GFR Estimate 17 (L) >60 mL/min/1.73m2    Calcium 8.8 8.8 - 10.2 mg/dL    Chloride 105 98 - 107 mmol/L    Glucose 114 (H) 70 - 99 mg/dL    Alkaline Phosphatase 66 40 - 150 U/L    AST 25 0 - 45 U/L    ALT 11 0 - 50 U/L    Protein Total 5.2 (L) 6.4 - 8.3 g/dL    Albumin 3.1 (L) 3.5 - 5.2 g/dL    Bilirubin Total 0.2 <=1.2 mg/dL   Blood gas venous   Result Value Ref Range    pH Venous 7.16 (LL) 7.32 -  7.43    pCO2 Venous 79 (HH) 40 - 50 mm Hg    pO2 Venous 47 25 - 47 mm Hg    Bicarbonate Venous 28 21 - 28 mmol/L    Base Excess/Deficit Venous -1.1 -3.0 - 3.0 mmol/L    FIO2 1     Oxyhemoglobin Venous 74 70 - 75 %    O2 Sat, Venous 77.2 (H) 70.0 - 75.0 %    Narrative    In healthy individuals, oxyhemoglobin (O2Hb) and oxygen saturation (SO2) are approximately equal. In the presence of dyshemoglobins, oxyhemoglobin can be considerably lower than oxygen saturation.   CBC with platelets and differential   Result Value Ref Range    WBC Count 5.6 4.0 - 11.0 10e3/uL    RBC Count 2.23 (L) 3.80 - 5.20 10e6/uL    Hemoglobin 8.2 (L) 11.7 - 15.7 g/dL    Hematocrit 27.7 (L) 35.0 - 47.0 %     (H) 78 - 100 fL    MCH 36.8 (H) 26.5 - 33.0 pg    MCHC 29.6 (L) 31.5 - 36.5 g/dL    RDW 21.1 (H) 10.0 - 15.0 %    Platelet Count 269 150 - 450 10e3/uL    % Neutrophils      % Lymphocytes      % Monocytes      % Eosinophils      % Basophils      % Immature Granulocytes      Absolute Neutrophils      Absolute Lymphocytes      Absolute Monocytes      Absolute Eosinophils      Absolute Basophils      Absolute Immature Granulocytes       *Note: Due to a large number of results and/or encounters for the requested time period, some results have not been displayed. A complete set of results can be found in Results Review.

## 2024-02-24 NOTE — PROVIDER NOTIFICATION
Provider notified (name):Claudia Yarbrough MD  Reason for notification:patient having nausea and unable to give Zofran for 2 more hours.  Phenergan ok?  Recommendation/request given to provider:  Response from provider: waiting for response

## 2024-02-24 NOTE — PROGRESS NOTES
"/65 (BP Location: Right arm)   Pulse 95   Temp 98.7  F (37.1  C) (Oral)   Resp 20   Ht 1.57 m (5' 1.81\")   Wt 49.1 kg (108 lb 3.9 oz)   SpO2 94%   BMI 19.92 kg/m      Denies pain or shortness of breath. A&Ox4. Intermittent nausea relieved with IV zofran. Up independently to commode. Loose green/brown stool x2. No void. Eating well and calorie counts being kept. Pulse oximeter study ordered for tonight. Lab called to draw blood culture from port. HD scheduled tomorrow 0810. Continue to encourage nutrition and activity as tolerated.   "

## 2024-02-24 NOTE — PROGRESS NOTES
Date: 2/24/2024  Time: 12:04 PM     Data:  Pre Wt:   49.1 kg (bed)  Desired Wt:   To be established  Post Wt:  45.6 kg (estimated)  Weight change: - 3.5 kg  Ultrafiltration - Post Run Net Total Removed (mL):  3500 ml  Vascular Access Status: Graft patent  Dialyzer Rinse:  Light  Total Blood Volume Processed: 63.2 L   Total Dialysis (Treatment) Time:   3 Hrs  Dialysate Bath: K 3, Ca 2.25  Heparin: Other: 500 hourly     Lab:   No  HbsAg - 2/17/2024 (Non Reactive)  HbsAb - 2/17/2024 8.44 (Susceptible)      Interventions:  Dialysis done through Left upper arm AV Graft using 15 gauge needles  UF set to 3.8 Liters, accommodating priming and rinse back volumes  -400,   Epogen 8000 units administered per MAR  See Flowsheet for Crit Profile throughout the run  Treatment has ended safely and  blood is rinsed back completely  Decannulation done post HD, hemostasis is achieved in 10 minutes  Pressure dressing is applied, to be removed after 4-6 hours  Report given to PCN, sent back to her room in stable condition     Assessment:  A/O x 4, calm and cooperative, denies pain  Lung sounds diminished anterior and lateral BUL/BLL  Left upper arm AV Graft has good thrill and bruit                Plan:    Per Renal team        THUAN Villanueva, RN  Acute Dialysis RN  [facility]

## 2024-02-24 NOTE — PROGRESS NOTES
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 On 2/17/24 she was transferred to ICU for worsening mental status and acute hypoxic and hypercarbic respiratory failure inspite of BiPAP requiring intubation. She was suspected to have PNA and started on antibiotics. Extubated on 2/19 without complications, now on RA, tolerated iHD on 2/20, and tolerating PO regular diet. Will hold tube feed and monitor calorie counts to determine nutrition plan for discharge. Also monitoring VBG for pCO2 and trial of HFNC. Will need overnight oximetry testing done prior to discharge to determine if more urgent follow up with sleep clinic is needed.         Changes today:   - overnight oximetry study suggestive of O2 vs CPAP need, as did require up to 2LPM overnight to prevent hypoxia  - VBG this AM much worse - will stop HFNC and trend VBG in AM tomorrow   - Blood culture from tunneled line/Rt CVC- NGTD  - Regular diet + increased TPN + stop TF  - Continue calorie counts, daily weights, I/Os  - Appreciate care coordination assistance with setting up home TPN infusions  - Zosyn completed (2/17 - 2/23)        # Severe malnutrition   # FTT   # Gastroparesis, small bowel dysmotility  # S/P PEG/J  with intolerance of enteral nutrition     Patient with gastroparesis (presumed due to vagal injury) and small bowel dysmotility complicated by unintentional 40lb weight loss over the past year and now severe malnutrition. Previously intolerant to oral food intake due to nausea. Was initially admitted for portacath and TPN initiation since 2/13. After extubation has been able to tolerate feeds without n/v from 2/20. Electrolytes trending towards baseline today.  Per GI, next steps for workup for malnutrition would include CT enterography to evaluate for anatomic abnormalities contributing to malnutrition vs possible treatment for SIBO (though patient has had multiple courses of treatment in the past with no long term improvement). Patient couldn't tolerate the oral load for CT enterography on 2/21, so will defer this until she is able to tolerate greater PO load. On 2/23 , again discussed with patient the need of small bowel motility study if not improving outpatient through [facility]. No ongoing concerns for SIBO on 2/23 as patient is passing multiple episodes of stools and gas with no abdominal pain or distention. C-diff test negative on 2/21. Per RD, patient tolerating >300 kcal of intake PO currently, so stopped trickle Tube feeds and continuing with PO and TPN for nutrition.     - Regular diet + increased TPN +  stopping feeds TF  per RD & transplant pul discussion               - Nephrology: limit fluid < 1.5 L per day for TPN ( chart vol of TPN in the I/O)   - RD concerned pt is not absorbing any oral intake and they will be monitoring Bowel function, I/O and, PO/TF/TPN intake.   -  stopped tube feeds for now with >300 kcal in PO intake, and monitor per discussion with transplant pulm, continue with TPN for majority of nutrition needs   - Continue calorie count  - CT enterography with contrast- Pt not able to tolerate on 2/21 No ongoing concerns for SIBO, would need small bowel motility study if not improving  outpatient through [facility]  - Daily Weights  - CMP in the AM      # Respiratory acidosis  # Acute hypoxic and hypercarbic respiratory failure  # S/P Lung transplant 2022  # Recurrent pneumonia     Patient received a bilateral lung transplant 6/28/22 for COPD. Was admitted 2/10 to address malnutrition   and admitted to ICU on 2/17 with hypoxic hypercarbic respiratory failure. Intubated on 2/18 AM  due to worsening oxygen requirements, likely due to pulmonary edema given hx of ESRD requiring HD vs infection given hx of lung transplant, immunosuppressed status, and recurrent pneumonias. Extubated on 2/19 without complications,   Micro Follow-up on BAL, viral panel, CMV, fungus, and Blood Cultures show no abnormalities. Does have slowly rising pCO2 on VBG - on trial of HFNC as she does not tolerate BiPAP mask due to claustrophobia to see if PEEP from HFNC can help improve ventilation. Will need overnight oximetry testing per transplant pulm team prior to discharge and follow up in sleep clinic outpatient.     - PRN hypertonic saline inhaler with albuterol nebs  - Continue good pulm toilet  - Transplant pulmonology following, recs appreciated  - PTA immunosuppressive agent  >Prednisone 5 mg every morning, 2.5 mg every afternoon; tacrolimus 4 mg every morning, 4mg every afternoon  > Monitor tacro levels, goal 7-9  > - overnight oximetry study suggestive of O2 vs CPAP need, as did require up to 2LPM overnight to prevent hypoxia  > Attempted to use HFNC for PEEP to improve pCO2 on VBG, however this was much worse in AM on 2/24, so HNFC discontinued -- ? If increased O2 decreased resp drive and led to increased CO2 retention  - PTA dapsone MWF for PJP prophylaxis  - completed course of zosyn for empiric HAP course (2/17 - 2-23)  - Initial decompensation likely from opioids - limit medications that would depress respiration   - port culture per transplant ID rec- NGTD      Antibiotics:    Vancomycin (2/17 - 2/17)  Zosyn (2/17 -  2/23)  Dapsone MWF, PJP ppx   Micafungin (2/18- 2/21)    Immunosuppression  Tacrolimus  Prednisone         # Steal physiology of LUE dialysis access fistula  LUE arterial US obtained 2/21 with concern for LUE edema. US of fistula demonstrated steal physiology. Discussed with nephrology, and vascular surgery consulted on 2/22. Vascular surgery has only minor concerns for steal symptoms and given that the AVF is working well, they are okay with outpatient fistulograms/ venoplasty with wrist brachial index and PPG's, unless new concerns arise.  - plan for outpatient workup as above; nephrology in agreement with outpatient workup.        # Hypoalbuminemia  # Left upper extremity unilateral edema, improving   # Bilateral pedal and ankle edema, improving  Edema due to third spacing due to hypoalbuminemia vs HF. BNP >67921 at admission, Echo on 2/18 shows EF of 55-60% with IVC of <2.1 and collapsing >50% with sniff, normal RA pressure, no significant valvular abnormalities ;  Left upper extremity swelling and  pitting lower extremity edema likely due to hypoalbuminemia improved today. Upper limb duplex USG on 2/18 negative for DVT. Wt went from 43.4 kg on admission to 47.4 kg today. She is urinating but cannot chart as she usually urinates with BM. She reports trending to baseline with urination. She denies dysuria, retention symptoms. USG left arm on 2/21 shows steel physiology and Vascular Surgery consulted. Vascular surgery has only minor concerns for steal symptoms and given that the AVF is working well, they are okay with outpatient fistulograms/ venoplasty with wrist brachial index and PPG's, unless new concerns arise. LUE and LE edema significantly decreased since yesterdays HD. No new tingling/ numbness noticed.  - Continue daily weights  - Strict I/Os  - Elevation and wrapping of only lower legs; no wrapping of Left upper extremity with dialysis fistula        # ESRD on HD T/Th/Sat  # Acute on chronic Anemia 2/2  ESRD  # Hypervolemic hyponatremia  # Anion gap metabolic acidosis - resolved  Patient is ESRD on T/Th/Sat HD, Hgb stabilizing. HD tolerating well. Continuing monitoring electrolytes daily. Anion gap normal since 2/21. Will continue to monitor daily labs/ ABG  for refeeding syndrome and acidosis  - Continue Venofer injections    - CBC and CMP daily  - Continue epogen dose 8000 units as per nephrology  - Strict I/Os  - HD T/TH/Sat per nephrology      # Facial and neck bruising  # Pain  Reports soreness in her neck from lying in bed. No soreness in the buttocks/ back. Patient has multiple bruises over her arms and face which is attributed to previous falls. No new bruising reported today. Patients reports her headaches are improving with tylenol. Using heating pads and lidocaine patch for body pains. Couple of small skin tears noted by the Rn's. Skin care done accordingly.  - Tylenol Q4  - Lidocaine patch prn  - Heating pads prn  -Diligent skin cares      # HTN  # A-fib, resolved  Noted atrial fibrillation on arrival with HR elevated at 150, not sustained for > 10 minutes and otherwise hemodynamically stable. Rates stable in the 80's. BP normalizing since 2/22.  - PTA metoprolol 25mg BID   - Continuous telemetry      # Encephalopathy secondary to hypercarbia - resolved    Patient was progressively more lethargic on 2/17 during admission in [facility]. Etiology likely secondary to hypercarbia in context of respiratory failure as patient was noted to have high CO2s concomitant with lethargy. Though receiving oxycodone and fentanyl, lethargy was only partially alleviated by narcan. LFTs and ammonia wnl with low suspicion of hepatic encephalopathy. BUN wnl with low suspicion for uremic encephalopathy. Head CT on 2/18 was negative with low suspicion of acute intracranial pathology. Patient is awake, alert, oriented and following commands since 2/20.         # Right hip fracture s/p ORIF (December 2023)   -  PT/OT    # GERD  - PTA PPI    # Low T4  Patient with new low T4 at 0.64 and TSH at 6.5, concerning for new hypothyroidism vs sick thyroid syndrome. TSH with appropriate response, more consistent with elevation in the setting of acute illness, levothyroxine is not indicated at this time, will monitor for symptom development. Consider repeat testing in outpatient setting once patient no longer acutely ill.       # Hx EBV viremia   # Hypogammaglobulinemia  # Chronic immunosuppression  Patient has been afebrile and has not had leukocytosis however she is under immunosuppression. Given acute respiratory failure and hx of recurrent pneumonias, she was started on empiric abx for concerns over new pneumonia. RVP and cultures no growth to date. Last day of empirical treatment for HAP.  - EBV ordered - 27K (decreased compared to prior)         Lines/tubes/drains:  - CVC RIJ (for TPN, is tunneled line)   - PIV x2  - PEG/J  - HD AV fistula, left arm        Diet:   parenteral nutrition - ADULT compounded formula at 43 mL/hr    Regular Diet Adult:    Calorie Counts starting    Adult Formula Drip Feeding: Continuous [employer] Peptide 1.5; Jejunostomy; Goal Rate: Begin at 10 mL/hr and hold; mL/hr; Do not advance tube feeding rate unless K+ is = or > 3.0, Mg++ is = or > 1.5, and Phos is = or > 1.9       General Cares/Prophylaxis:    DVT Prophylaxis: Heparin SQ  GI Prophylaxis: PPI  Fluids: None  Code Status: Full    Clinically Significant Risk Factors              # Hypoalbuminemia: Lowest albumin = 2.6 g/dL at 2/18/2024  5:13 AM, will monitor as appropriate             # Severe Malnutrition: based on nutrition assessment    # Financial/Environmental Concerns: none                Disposition Plan:      Expected Discharge Date: 2/26/24  Destination: TCU/ home with help/services  Discharge Comments:           The patient's care was discussed with the Attending Physician, Dr. Jayne Isaac MD  Internal Medicine  "Resident, PGY-3  Medicine Service, [place] TEAM 1  [facility]  Securely message with [employer] (more info)  Text page via [employer] Paging/Directory   See signed in provider for up to date coverage information      _________________        Interval History  No acute events overnight. Patient has continued to have some episodes of diarrhea, however per chart review these are overall stable/improved compared to prior. Has been tolerating fairly good amount of PO intake. Had some nausea last night after \"eating too much\" that resolved prior to prn dose of compazine being given.   Overnight oximetry study did demonstrate hypoxia overnight requiring 2LPM O2. HFNC did not help with markedly worse VBG this AM - will plan to stop HFNC. Transplant pulm recommending metabolic cart study with RT, discussed this with patient who agrees to testing.   Headaches much better controlled with increased dose of tylenol. Left arm still swollen but overall better.   No shortness of breath. No new cough. No leg pain. No abdominal pain.     Physical Exam  /57 (BP Location: Left arm)   Pulse 87   Temp 97.8  F (36.6  C) (Oral)   Resp 20   Ht 1.57 m (5' 1.81\")   Wt 44.8 kg (98 lb 12.3 oz)   SpO2 100%   BMI 18.18 kg/m         General: thin, alert and oriented  HEENT: Normocephalic, bruising on the right face around right orbit with hematoma and right neck.  Neuro: NAD, following commands, a&o  Pulm/Resp: normal WOB on RA, able to clear secretions, no cough during exam  CV: RRR, warm extremities, 2+ pitting edema in left hand, no edema in right arm or hand, and 1+ pitting edema from bilateral ankles to feet  Abdomen: Non-distended, PEG in place without erythema or drainage  Incisions/Skin: Bruising to face and right forearm.     Labs and Imaging for this encounter reviewed in chart review.       "

## 2024-02-25 LAB
ALBUMIN SERPL BCG-MCNC: 3.1 G/DL (ref 3.5–5.2)
ALP SERPL-CCNC: 66 U/L (ref 40–150)
ALT SERPL W P-5'-P-CCNC: 12 U/L (ref 0–50)
ANION GAP SERPL CALCULATED.3IONS-SCNC: 8 MMOL/L (ref 7–15)
AST SERPL W P-5'-P-CCNC: 25 U/L (ref 0–45)
BASE EXCESS BLDV CALC-SCNC: -0.3 MMOL/L (ref -3–3)
BASOPHILS # BLD AUTO: ABNORMAL 10*3/UL
BASOPHILS # BLD MANUAL: 0 10E3/UL (ref 0–0.2)
BASOPHILS NFR BLD AUTO: ABNORMAL %
BASOPHILS NFR BLD MANUAL: 0 %
BILIRUB SERPL-MCNC: 0.2 MG/DL
BUN SERPL-MCNC: 35 MG/DL (ref 8–23)
CALCIUM SERPL-MCNC: 8.7 MG/DL (ref 8.8–10.2)
CHLORIDE SERPL-SCNC: 101 MMOL/L (ref 98–107)
CREAT SERPL-MCNC: 2.36 MG/DL (ref 0.51–0.95)
DEPRECATED HCO3 PLAS-SCNC: 26 MMOL/L (ref 22–29)
EGFRCR SERPLBLD CKD-EPI 2021: 23 ML/MIN/1.73M2
EOSINOPHIL # BLD AUTO: ABNORMAL 10*3/UL
EOSINOPHIL # BLD MANUAL: 0.1 10E3/UL (ref 0–0.7)
EOSINOPHIL NFR BLD AUTO: ABNORMAL %
EOSINOPHIL NFR BLD MANUAL: 1 %
ERYTHROCYTE [DISTWIDTH] IN BLOOD BY AUTOMATED COUNT: 21 % (ref 10–15)
GLUCOSE SERPL-MCNC: 124 MG/DL (ref 70–99)
HCO3 BLDV-SCNC: 30 MMOL/L (ref 21–28)
HCT VFR BLD AUTO: 29.9 % (ref 35–47)
HGB BLD-MCNC: 8.8 G/DL (ref 11.7–15.7)
IMM GRANULOCYTES # BLD: ABNORMAL 10*3/UL
IMM GRANULOCYTES NFR BLD: ABNORMAL %
LYMPHOCYTES # BLD AUTO: ABNORMAL 10*3/UL
LYMPHOCYTES # BLD MANUAL: 0.8 10E3/UL (ref 0.8–5.3)
LYMPHOCYTES NFR BLD AUTO: ABNORMAL %
LYMPHOCYTES NFR BLD MANUAL: 12 %
MAGNESIUM SERPL-MCNC: 1.8 MG/DL (ref 1.7–2.3)
MAGNESIUM SERPL-MCNC: 2 MG/DL (ref 1.7–2.3)
MCH RBC QN AUTO: 37.6 PG (ref 26.5–33)
MCHC RBC AUTO-ENTMCNC: 29.4 G/DL (ref 31.5–36.5)
MCV RBC AUTO: 128 FL (ref 78–100)
METAMYELOCYTES # BLD MANUAL: 0.1 10E3/UL
METAMYELOCYTES NFR BLD MANUAL: 2 %
MONOCYTES # BLD AUTO: ABNORMAL 10*3/UL
MONOCYTES # BLD MANUAL: 0.5 10E3/UL (ref 0–1.3)
MONOCYTES NFR BLD AUTO: ABNORMAL %
MONOCYTES NFR BLD MANUAL: 8 %
MYELOCYTES # BLD MANUAL: 0.4 10E3/UL
MYELOCYTES NFR BLD MANUAL: 6 %
NEUTROPHILS # BLD AUTO: ABNORMAL 10*3/UL
NEUTROPHILS # BLD MANUAL: 4.8 10E3/UL (ref 1.6–8.3)
NEUTROPHILS NFR BLD AUTO: ABNORMAL %
NEUTROPHILS NFR BLD MANUAL: 71 %
NRBC # BLD AUTO: 0.1 10E3/UL
NRBC # BLD AUTO: 0.1 10E3/UL
NRBC BLD AUTO-RTO: 1 /100
NRBC BLD MANUAL-RTO: 2 %
O2/TOTAL GAS SETTING VFR VENT: 3 %
OXYHGB MFR BLDV: 79 % (ref 70–75)
PCO2 BLDV: 89 MM HG (ref 40–50)
PH BLDV: 7.14 [PH] (ref 7.32–7.43)
PHOSPHATE SERPL-MCNC: 3.8 MG/DL (ref 2.5–4.5)
PHOSPHATE SERPL-MCNC: 3.9 MG/DL (ref 2.5–4.5)
PLAT MORPH BLD: ABNORMAL
PLATELET # BLD AUTO: 263 10E3/UL (ref 150–450)
PO2 BLDV: 51 MM HG (ref 25–47)
POTASSIUM SERPL-SCNC: 4 MMOL/L (ref 3.4–5.3)
PROT SERPL-MCNC: 5.3 G/DL (ref 6.4–8.3)
RBC # BLD AUTO: 2.34 10E6/UL (ref 3.8–5.2)
RBC MORPH BLD: ABNORMAL
SAO2 % BLDV: 81.3 % (ref 70–75)
SODIUM SERPL-SCNC: 135 MMOL/L (ref 135–145)
TACROLIMUS BLD-MCNC: 6.8 UG/L (ref 5–15)
TACROLIMUS BLD-MCNC: 7.8 UG/L (ref 5–15)
TME LAST DOSE: NORMAL H
WBC # BLD AUTO: 6.8 10E3/UL (ref 4–11)

## 2024-02-25 PROCEDURE — 85007 BL SMEAR W/DIFF WBC COUNT: CPT

## 2024-02-25 PROCEDURE — 85027 COMPLETE CBC AUTOMATED: CPT

## 2024-02-25 PROCEDURE — 250N000009 HC RX 250: Performed by: STUDENT IN AN ORGANIZED HEALTH CARE EDUCATION/TRAINING PROGRAM

## 2024-02-25 PROCEDURE — 250N000013 HC RX MED GY IP 250 OP 250 PS 637: Performed by: STUDENT IN AN ORGANIZED HEALTH CARE EDUCATION/TRAINING PROGRAM

## 2024-02-25 PROCEDURE — 99233 SBSQ HOSP IP/OBS HIGH 50: CPT | Performed by: INTERNAL MEDICINE

## 2024-02-25 PROCEDURE — 36415 COLL VENOUS BLD VENIPUNCTURE: CPT | Performed by: INTERNAL MEDICINE

## 2024-02-25 PROCEDURE — 250N000013 HC RX MED GY IP 250 OP 250 PS 637

## 2024-02-25 PROCEDURE — 250N000011 HC RX IP 250 OP 636: Performed by: INTERNAL MEDICINE

## 2024-02-25 PROCEDURE — 36592 COLLECT BLOOD FROM PICC: CPT

## 2024-02-25 PROCEDURE — 250N000012 HC RX MED GY IP 250 OP 636 PS 637: Performed by: INTERNAL MEDICINE

## 2024-02-25 PROCEDURE — 83735 ASSAY OF MAGNESIUM: CPT

## 2024-02-25 PROCEDURE — 84100 ASSAY OF PHOSPHORUS: CPT

## 2024-02-25 PROCEDURE — 120N000002 HC R&B MED SURG/OB UMMC

## 2024-02-25 PROCEDURE — 250N000009 HC RX 250

## 2024-02-25 PROCEDURE — 99232 SBSQ HOSP IP/OBS MODERATE 35: CPT | Mod: GC | Performed by: STUDENT IN AN ORGANIZED HEALTH CARE EDUCATION/TRAINING PROGRAM

## 2024-02-25 PROCEDURE — 82805 BLOOD GASES W/O2 SATURATION: CPT | Performed by: INTERNAL MEDICINE

## 2024-02-25 PROCEDURE — 82040 ASSAY OF SERUM ALBUMIN: CPT | Performed by: INTERNAL MEDICINE

## 2024-02-25 PROCEDURE — 80197 ASSAY OF TACROLIMUS: CPT | Performed by: STUDENT IN AN ORGANIZED HEALTH CARE EDUCATION/TRAINING PROGRAM

## 2024-02-25 PROCEDURE — 250N000013 HC RX MED GY IP 250 OP 250 PS 637: Performed by: INTERNAL MEDICINE

## 2024-02-25 PROCEDURE — 36592 COLLECT BLOOD FROM PICC: CPT | Performed by: STUDENT IN AN ORGANIZED HEALTH CARE EDUCATION/TRAINING PROGRAM

## 2024-02-25 RX ADMIN — METOPROLOL TARTRATE 25 MG: 100 TABLET, FILM COATED ORAL at 00:13

## 2024-02-25 RX ADMIN — TACROLIMUS 4 MG: 5 CAPSULE ORAL at 09:26

## 2024-02-25 RX ADMIN — Medication 40 MG: at 20:59

## 2024-02-25 RX ADMIN — PREDNISONE 2.5 MG: 2.5 TABLET ORAL at 21:00

## 2024-02-25 RX ADMIN — CYANOCOBALAMIN TAB 500 MCG 500 MCG: 500 TAB at 09:27

## 2024-02-25 RX ADMIN — MAGNESIUM SULFATE HEPTAHYDRATE: 500 INJECTION, SOLUTION INTRAMUSCULAR; INTRAVENOUS at 21:11

## 2024-02-25 RX ADMIN — ONDANSETRON 4 MG: 2 INJECTION INTRAMUSCULAR; INTRAVENOUS at 14:52

## 2024-02-25 RX ADMIN — CALCIUM CARBONATE 600 MG (1,500 MG)-VITAMIN D3 400 UNIT TABLET 1 TABLET: at 09:29

## 2024-02-25 RX ADMIN — LOPERAMIDE HYDROCHLORIDE 2 MG: 2 CAPSULE ORAL at 21:13

## 2024-02-25 RX ADMIN — TACROLIMUS 4.5 MG: 5 CAPSULE ORAL at 20:59

## 2024-02-25 RX ADMIN — Medication 40 MG: at 09:26

## 2024-02-25 RX ADMIN — HEPARIN SODIUM 5000 UNITS: 5000 INJECTION, SOLUTION INTRAVENOUS; SUBCUTANEOUS at 00:17

## 2024-02-25 RX ADMIN — METOPROLOL TARTRATE 25 MG: 100 TABLET, FILM COATED ORAL at 13:16

## 2024-02-25 RX ADMIN — METOPROLOL TARTRATE 25 MG: 100 TABLET, FILM COATED ORAL at 20:59

## 2024-02-25 RX ADMIN — LOPERAMIDE HYDROCHLORIDE 2 MG: 2 CAPSULE ORAL at 09:26

## 2024-02-25 RX ADMIN — CALCIUM CARBONATE 600 MG (1,500 MG)-VITAMIN D3 400 UNIT TABLET 1 TABLET: at 21:00

## 2024-02-25 RX ADMIN — PREDNISONE 5 MG: 5 TABLET ORAL at 09:26

## 2024-02-25 RX ADMIN — LIDOCAINE 4% 1 PATCH: 40 PATCH TOPICAL at 20:58

## 2024-02-25 RX ADMIN — CALCIUM CARBONATE 600 MG (1,500 MG)-VITAMIN D3 400 UNIT TABLET 1 TABLET: at 13:15

## 2024-02-25 RX ADMIN — LOPERAMIDE HYDROCHLORIDE 2 MG: 2 CAPSULE ORAL at 13:15

## 2024-02-25 RX ADMIN — HEPARIN SODIUM 5000 UNITS: 5000 INJECTION, SOLUTION INTRAVENOUS; SUBCUTANEOUS at 13:16

## 2024-02-25 ASSESSMENT — ACTIVITIES OF DAILY LIVING (ADL)
ADLS_ACUITY_SCORE: 36

## 2024-02-25 NOTE — PLAN OF CARE
"Goal Outcome Evaluation:      Plan of Care Reviewed With: patient    Overall Patient Progress: no changeOverall Patient Progress: no change  /55 (BP Location: Right arm, Patient Position: Semi-Schuster's, Cuff Size: Adult Small)   Pulse 87   Temp 96.8  F (36  C) (Oral)   Resp 18   Ht 1.57 m (5' 1.81\")   Wt 44.8 kg (98 lb 12.3 oz)   SpO2 100%   BMI 18.18 kg/m     Pt AOx4, VSS, on 1L NC. Denies being in pain.  Regular diet with TPN infusing continto (R) port cath infusing continuous TPN. Dialysis T/TH/SA. No BM, anuric. VBG completed this AM, agreed to try the high flow, refused BIPAP due to being claustrophobic. Continue POC    "

## 2024-02-25 NOTE — PROGRESS NOTES
Lung Transplant Consult Follow Up Note   February 25, 2024            Assessment and Plan:   Sofie Rodriguez is a 63 year old female with h/o bilateral lung transplant for COPD on 6/28/22 with course complicated by post-operative hemidiaphragm palsy, recurrent PNAs, positive DSA, EBV viremia, hypogammaglobulinemia, severe gastroparesis s/p G/J tube placement 7/27/22 with pyloric botox 1/25/23, GI bleed 2/2 pyloric ulcer, hemobilia s/p ERCP and MRCP, chronic diarrhea, recurrent C diff colitis, and ESRD on HD.   Admitted May 2023 for FTT.  Admitted to OSH 12/4-12/31 in December for right hip fracture s/p ORIF, now with significant deconditioning and ongoing severe malnutrition with gastroparesis, small bowel hypermotility and failure to tolerate any tube feeds.   After extensive discussion with the dietician, GI and ultimately convincing the patient, the patient was admitted on 2/10 for initiation of TPN/lipids. She is s/p port placement with persistent pain at port site. GI recommends trickle feeds for maintaining bowel (stopped with increased PO intake) and CT enterography to look for structural abnormalities (unable due to large bolus enteral contrast required for the study). She was transferred to the ICU on 2/17 with worsening mental status, worsening respiratory acidosis despite Bpap use, ultimately requiring intubation and mechanical ventilation. CT chest concerning for new infection vs volume overload and started on empiric antimicrobial therapy (micafungin, zosyn, vanco) however extubated 2/19 and infectious work up is negative to date Tolerating TPN and small amt of PO intake. Persistent/progressive (asymptomatic) CO2 retention.     Recommendations:   - Metabolic cart study to evaluate rising PCO2  - Use minimum O2 to keep SaO2 >85% in an effort to maintain resp drive.  - Tacrolimus level 7.8 at 11h and 6.8 at 13h, Continue current dose. Recheck Wednesday (ordered)  - VBG in AM (Ordered)  - Electrolyte  replacement as per primary team to manage patients probable refeeding syndrome  - Strict I/Os and daily weights,  - O2 to keep SaO2 > 92%  - Prospera 2/18 (pending)        S/p bilateral lung transplant:   Right hemidiaphragm palsy:   Suspected CARLEE:  New consolidated nodular opacities and GGO concerning for infection:  Severely deconditioned. CMV 2/7 negative. ImmuKnow 108 and cfDNA 0.12 on 1/10. CT 2/7 with decreased MARLON opacities, new tree in bud RLL opacities without new symptoms. PFTs unchanged in clinic (but ATS not met), remain significantly below her baseline (1.4-1.5L). Noted increased dyspnea since the Port-A-Cath placement.  Review of notes indicate patient should be on 2 L o2 at night from prior overnight O2 study in Jan 2023. Also reported a slight increase in cough. She was requiring O2 for comfort only but decompensated on 2/17 with worsening encephalopathy, respiratory acidosis (pCO2 up to 106). CT chest 2/17 showed very patchy and new consolidative, nodular opacities, GGO primarily in the bases and intralobular septal thickening concerning for pulmonary edema and volume overload along with new, possibly fungal vs. Atypical infection vs. PTLD (less likely but in the differential) vs. Septic emboli.  Based on negative infectious work up at this time, would favor volume overload in the setting of initiation of TPN. Continued clinical improvement with increased nutrition and dialysis.   Persistent/progressive CO2 retention. The patient is entirely asymptomatic despite progressive CO2 retention and acidosis.  Adamantly refused BiPAP. No improvement with high flow. Clinically does NOT appear to need intubation/ventilation. Etiology of progressive CO2 retention is unclear.  May be a component of overfeeding, recommend metabolic cart as below.  Also appears to be an issue with drive since the patient feels well with this blood gas(7.14/89) . Suspect chronic, untreated sleep apnea. Recommend conservative  approach to supplemental O2 with goal SaO2 >85% and use the minimum O2 required to maintain saturation to avoid blunting any hypoxic ventilatory drive. Recommend sleep clinic eval as soon as possible after discharge.  - 2/25 VBG 7.14/89 (asymptomatic and patient adamantly refusing BiPAP).   - O/N oximetry, reviewed by me, 10:14 (min:sec) < 88% (O2 initiated at 0046, increased to 2L at 3:40).  Frequent desat, pattern suggests sleep apnea, needs formal seep study, may be contributing to increased PCO2 with decreased resp drive from chronic elevated PCO2.  - Check metabolic cart to assess etiology of increasing CO2.  - bronch with lavage of RML 2/18 showed very friable tissue  - 2/18 bronch cultures with candida glabrata (likely colonization) but otherwise NGTD.  - 2/18 Galactomannan (-)  - 2/18 Fungitell (-)  - Completed 7 days of zosyn (2/17-2/24) agree with stopping with no evidence of active infection.  - 2/19 CMV (-)  - Micafungin 2/18-2/21,no evidence of fungus so stopped.  - DSA 2/7 with persistent DQ B2, MFI increased to 6966, see below     - Prospera to evaluate significance of positive DSA (2/18 pending )  - schedule follow up with sleep to discuss possible CPAP given recommendations from August sleep study  - appreciate strict I&O and volume management as per primary and nephrology     Immunosuppression:  - Tacrolimus toal level 7-9. 2 levels obtained today, 7.8 at 11h and 6.8 at 13h. This suggests current dose is adequate.  Continue current dose and recheck Wednesday (ordered)  - Await Prospera (pending from 2/18) for consideration of IS regimen alterations  - Continue Prednisone 5 mg/2.5 mg     Prophylaxis:   - Dapsone 50 mg q MWF for PJP ppx     Positive DSA: no prior treatment for AMR, has been watched for quite some time given no pulmonary symptoms, prior PFTs stability and significant and ongoing failure to thrive. Last DSA improved to 5723, however will ongoing lower PFTs, may need to consider AMR  treatment, however, unclear how she would tolerate.   -  DSA 2/7 with persistent DQ B2, MFI increased to 6966  - Prospera to evaluate significance of positive DSA (pending 2/18)     ESRD: Dialysis dependent.  Suspect her respiratory symptoms were volume overload secondary to initiation of TPN.  CT chest suggesting volume overload. Has undergone multiple days in a row of dialysis to try to pull fluid  -Dialysis as per nephrology.  -Monitor strict ins and outs      EBV viremia: last level of 96K (2/7), CT c/a/p (2/7) without adenopathy.  - EBV 2/18 = 93673.       Acute metabolic encephalopathy - resolved     Severe protein calorie malnutrition:  FTT:   Gastroparesis s/p PEG/J s/p botox and G-POEM:   SB Hypomotility  Pyloric ulcer:  Chronic nausea and osmotic diarrhea:  SIBO s/p rifaximin:   Recurrent C diff colitis: chronic diarrhea since transplant with recurrent episodes of C diff. GI previously consulted, felt stools consistent with osmotic diarrhea. Over the last year has lost 40 lbs, unable to tolerate any combination of TF, most recently elemental formula. Normal fecal elastase last May. Following with Dr. Navarro who feels her main two issues are vagal injury induced gastroparesis and probably small bowel hypomotility. Her intolerance to J-tube feeds suggests the latter to be a significant issue (notes in a message that there are no motility experts at the  and he is limited in what can be offered). Now s/p port placement with TPN and lipids. GI recommending CT to assess for structural issues. Now taking modest PO.  -close monitoring as patient is high risk for refeeding syndrome  -Recs per primary and GI  -Concern that patient will not tolerate CT enterography according to chart notes as she will require 1500 mL enteral contrast bolus which she is unlikely to tolerate given her gastroparesis and reduced bowel function.  - OK to hold trickle TF if patient is tolerating PO consistently.     We appreciate the  excellent care provided by the ICU team.    Will continue to follow along closely, please do not hesitate to call with any questions or concerns.         Ajay Castillo MD  [phone]            Interval History:   No events overnight.  Continued abd pain and nausea after meals.  Persistent loose stools  Dyspnea at rest: None  Dyspnea on exertion:  Tolerating increasing ambulation, more limited by weakness and hip pain  Cough: infrequent  Sputum:  none   Hemoptysis: none   Chest Pain: None           Review of Systems:   C: NEGATIVE for fever, chills  INTEGUMENTARY/SKIN: no rash or obvious new lesions  ENT/MOUTH: no sore throat, new sinus pain or nasal drainage  RESP: see interval history  CV: NEGATIVE for chest pain, palpitations. Persistent BLE and LUE  edema  GI: See interval history.   : dialysis  MUSCULOSKELETAL: hip pain after amblation            Medications:      calcium carbonate-vitamin D  1 tablet Per J Tube TID w/meals    cyanocobalamin  500 mcg Per Feeding Tube Daily    dapsone  50 mg Per J Tube Q Mon Wed Fri AM    heparin ANTICOAGULANT  5,000 Units Subcutaneous Q12H    lidocaine  1 patch Transdermal Q24h    lipids plant base  250 mL Intravenous Once per day on Monday Wednesday Friday    metoprolol  25 mg Per J Tube BID    pantoprazole  40 mg Per J Tube BID    predniSONE  5 mg Per J Tube QAM    And    predniSONE  2.5 mg Per J Tube QPM    [Held by provider] sevelamer carbonate (RENVELA)  0.8 g Oral BID    tacrolimus  4 mg Per J Tube QAM    And    tacrolimus  4.5 mg Per J Tube QPM     acetaminophen, albumin human, albuterol, calcium carbonate, dextrose, dextrose, glucose **OR** dextrose **OR** glucagon, lidocaine (buffered or not buffered), lidocaine-prilocaine, loperamide, naloxone **OR** naloxone **OR** naloxone **OR** naloxone, ondansetron **OR** ondansetron, senna-docusate **OR** senna-docusate, sodium chloride, sodium chloride (PF), sodium chloride 0.9%         Physical Exam:   Temp:  [96.8  F (36   C)-98.3  F (36.8  C)] 96.8  F (36  C)  Pulse:  [84-91] 87  Resp:  [16-23] 18  BP: (107-129)/(49-70) 114/55  SpO2:  [87 %-100 %] 100 %    Intake/Output Summary (Last 24 hours) at 2/25/2024 0732  Last data filed at 2/25/2024 0020  Gross per 24 hour   Intake 720 ml   Output 19345 ml   Net -78212 ml     Constitutional:   Awake, alert and in no apparent distress     Eyes:   nonicteric     ENT:    oral mucosa moist without lesions     Neck:   Supple without supraclavicular or cervical lymphadenopathy     Lungs:   Good air flow.  Bibasilar crackles. No rhonchi.  No wheezes.     Cardiovascular:   Normal S1 and S2.  RRR.  II/VI sys  murmur. No gallop. No rub.     Abdomen:   NABS, soft, nondistended, nontender.  No HSM.     Musculoskeletal:   2+ RLE, 1+ LLE, 3+ LUE edema     Neurologic:   Alert and conversant.     Skin:   Warm, dry.  No rash on limited exam.             Data:   All laboratory and imaging data reviewed.    Results for orders placed or performed during the hospital encounter of 02/10/24 (from the past 24 hour(s))   Blood gas venous   Result Value Ref Range    pH Venous 7.14 (LL) 7.32 - 7.43    pCO2 Venous 89 (HH) 40 - 50 mm Hg    pO2 Venous 51 (H) 25 - 47 mm Hg    Bicarbonate Venous 30 (H) 21 - 28 mmol/L    Base Excess/Deficit Venous -0.3 -3.0 - 3.0 mmol/L    FIO2 3     Oxyhemoglobin Venous 79 (H) 70 - 75 %    O2 Sat, Venous 81.3 (H) 70.0 - 75.0 %    Narrative    In healthy individuals, oxyhemoglobin (O2Hb) and oxygen saturation (SO2) are approximately equal. In the presence of dyshemoglobins, oxyhemoglobin can be considerably lower than oxygen saturation.   CBC with Platelets & Differential    Narrative    The following orders were created for panel order CBC with Platelets & Differential.  Procedure                               Abnormality         Status                     ---------                               -----------         ------                     CBC with platelets and d...[[id number]]  Abnormal             Final result               Manual Differential[[id number]]          Abnormal            Final result                 Please view results for these tests on the individual orders.   Magnesium   Result Value Ref Range    Magnesium 1.8 1.7 - 2.3 mg/dL   Phosphorus   Result Value Ref Range    Phosphorus 3.9 2.5 - 4.5 mg/dL   Comprehensive metabolic panel   Result Value Ref Range    Sodium 135 135 - 145 mmol/L    Potassium 4.0 3.4 - 5.3 mmol/L    Carbon Dioxide (CO2) 26 22 - 29 mmol/L    Anion Gap 8 7 - 15 mmol/L    Urea Nitrogen 35.0 (H) 8.0 - 23.0 mg/dL    Creatinine 2.36 (H) 0.51 - 0.95 mg/dL    GFR Estimate 23 (L) >60 mL/min/1.73m2    Calcium 8.7 (L) 8.8 - 10.2 mg/dL    Chloride 101 98 - 107 mmol/L    Glucose 124 (H) 70 - 99 mg/dL    Alkaline Phosphatase 66 40 - 150 U/L    AST 25 0 - 45 U/L    ALT 12 0 - 50 U/L    Protein Total 5.3 (L) 6.4 - 8.3 g/dL    Albumin 3.1 (L) 3.5 - 5.2 g/dL    Bilirubin Total 0.2 <=1.2 mg/dL   CBC with platelets and differential   Result Value Ref Range    WBC Count 6.8 4.0 - 11.0 10e3/uL    RBC Count 2.34 (L) 3.80 - 5.20 10e6/uL    Hemoglobin 8.8 (L) 11.7 - 15.7 g/dL    Hematocrit 29.9 (L) 35.0 - 47.0 %     (H) 78 - 100 fL    MCH 37.6 (H) 26.5 - 33.0 pg    MCHC 29.4 (L) 31.5 - 36.5 g/dL    RDW 21.0 (H) 10.0 - 15.0 %    Platelet Count 263 150 - 450 10e3/uL    % Neutrophils      % Lymphocytes      % Monocytes      % Eosinophils      % Basophils      % Immature Granulocytes      NRBCs per 100 WBC 1 (H) <1 /100    Absolute Neutrophils      Absolute Lymphocytes      Absolute Monocytes      Absolute Eosinophils      Absolute Basophils      Absolute Immature Granulocytes      Absolute NRBCs 0.1 10e3/uL   Manual Differential   Result Value Ref Range    % Neutrophils 71 %    % Lymphocytes 12 %    % Monocytes 8 %    % Eosinophils 1 %    % Basophils 0 %    % Metamyelocytes 2 %    % Myelocytes 6 %    NRBCs per 100 WBC 2 (H) <=0 %    Absolute Neutrophils 4.8 1.6 - 8.3 10e3/uL     Absolute Lymphocytes 0.8 0.8 - 5.3 10e3/uL    Absolute Monocytes 0.5 0.0 - 1.3 10e3/uL    Absolute Eosinophils 0.1 0.0 - 0.7 10e3/uL    Absolute Basophils 0.0 0.0 - 0.2 10e3/uL    Absolute Metamyelocytes 0.1 (H) <=0.0 10e3/uL    Absolute Myelocytes 0.4 (H) <=0.0 10e3/uL    Absolute NRBCs 0.1 (H) <=0.0 10e3/uL    RBC Morphology Confirmed RBC Indices     Platelet Assessment  Automated Count Confirmed. Platelet morphology is normal.     Automated Count Confirmed. Platelet morphology is normal.     *Note: Due to a large number of results and/or encounters for the requested time period, some results have not been displayed. A complete set of results can be found in Results Review.

## 2024-02-25 NOTE — PROVIDER NOTIFICATION
"8pm Metoprolol not in bin. Pharmacy notified over the phone at 8:30pm and again at 9:45pm and at 2304 on MAR.     Blood pressure 116/60, pulse 90, temperature 98.1  F (36.7  C), temperature source Oral, resp. rate 20, height 1.57 m (5' 1.81\"), weight 44.8 kg (98 lb 12.3 oz), SpO2 96%, not currently breastfeeding.   "

## 2024-02-25 NOTE — PROGRESS NOTES
"/57 (BP Location: Left arm)   Pulse 87   Temp 97.8  F (36.6  C) (Oral)   Resp 20   Ht 1.57 m (5' 1.81\")   Wt 44.8 kg (98 lb 12.3 oz)   SpO2 100%   BMI 18.18 kg/m      AOx4. Denies pain. Intermittent nausea relieved with zofran. Return from dialysis at 1230. Sleepy most of afternoon. Eating well. Up to commode x1 and continues to have loose, watery green stool output. VBG worse this AM than yesterday. Denies shortness of breath, no mental status changes or lightheadedness present. Currently up walking in halls with RN. Awaiting tacro dose form pharmacy in order to give on time.   "

## 2024-02-25 NOTE — PROVIDER NOTIFICATION
7C  0907  refused BIPAP because she is claustrophobic  but will try the [facility]     [phone]   Peng Advancement Flap Text: The defect edges were debeveled with a #15 scalpel blade.  Given the location of the defect, shape of the defect and the proximity to free margins a Peng advancement flap was deemed most appropriate.  Using a sterile surgical marker, an appropriate advancement flap was drawn incorporating the defect and placing the expected incisions within the relaxed skin tension lines where possible. The area thus outlined was incised deep to adipose tissue with a #15 scalpel blade.  The skin margins were undermined to an appropriate distance in all directions utilizing iris scissors.

## 2024-02-25 NOTE — PROGRESS NOTES
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 On 2/17/24 she was transferred to ICU for worsening mental status and acute hypoxic and hypercarbic respiratory failure inspite of BiPAP requiring intubation. She was suspected to have PNA and started on antibiotics. Extubated on 2/19 without complications, now on RA, tolerated iHD on 2/20, and tolerating PO regular diet. Will hold tube feed and monitor calorie counts to determine nutrition plan for discharge. Monitoring VBG in AM due to elevated carbon dioxide, will hold continued use of HFNC.    Changes today:   - Awaiting results from metabolic CART study  - Continue regular diet + TPN  - VBG in AM  - Blood culture from tunneled line/Rt CVC- NGTD  - Continue calorie counts, daily weights, I/Os  - Continue to monitor electrolytes for concerns of refeeding    # Severe malnutrition   # FTT   # Gastroparesis, small bowel dysmotility  # S/P PEG/J with intolerance of enteral nutrition   Patient with gastroparesis (presumed due to vagal injury) and small bowel dysmotility complicated by unintentional 40lb weight loss over the past year and now severe malnutrition. Previously intolerant to oral food intake due to nausea. Was initially  admitted for portacath and TPN initiation since 2/13. After extubation has been able to tolerate feeds without n/v from 2/20. Electrolytes trending towards baseline today.  Per GI, next steps for workup for malnutrition would include CT enterography to evaluate for anatomic abnormalities contributing to malnutrition vs possible treatment for SIBO (though patient has had multiple courses of treatment in the past with no long term improvement). Patient couldn't tolerate the oral load for CT enterography on 2/21, so will defer this until she is able to tolerate greater PO load. On 2/23 , again discussed with patient the need of small bowel motility study if not improving outpatient through [facility]. No ongoing concerns for SIBO on 2/23 as patient is passing multiple episodes of stools and gas with no abdominal pain or distention. C-diff test negative on 2/21. Per RD, patient tolerating >300 kcal of intake PO currently, so stopped trickle Tube feeds and continuing with PO and TPN for nutrition.   - Regular diet + increased TPN +  stopping feeds TF  per RD & transplant pul discussion               - Nephrology: limit fluid < 1.5 L per day for TPN ( chart vol of TPN in the I/O)   - RD concerned pt is not absorbing any oral intake and they will be monitoring Bowel function, I/O and, PO/TF/TPN intake.   -  stopped tube feeds for now with >300 kcal in PO intake, and monitor per discussion with transplant pulm, continue with TPN for majority of nutrition needs  - Continue calorie count  - CT enterography with contrast- Pt not able to tolerate on 2/21 No ongoing concerns for SIBO, would need small bowel motility study if not improving outpatient through [facility]  - Daily Weights  - CMP in the AM    #Respiratory acidosis  #Acute hypoxic and hypercarbic respiratory failure  #S/P Lung transplant 2022  #Recurrent pneumonia   Patient received a bilateral lung transplant 6/28/22 for COPD. Was admitted 2/10 to address malnutrition   and  admitted to ICU on 2/17 with hypoxic hypercarbic respiratory failure. Intubated on 2/18 AM  due to worsening oxygen requirements, likely due to pulmonary edema given hx of ESRD requiring HD vs infection given hx of lung transplant, immunosuppressed status, and recurrent pneumonias. Extubated on 2/19 without complications,   Micro Follow-up on BAL, viral panel, CMV, fungus, and Blood Cultures show no abnormalities. Does have slowly rising pCO2 on VBG - HFNC did not seem to improve elevated pCO2.  Patient likely requiring BiPAP, although patient is not agreeable.  Awaiting metabolic CART study to evaluate rising pCO2  - PRN hypertonic saline inhaler with albuterol nebs  - Continue good pulm toilet  - Transplant pulmonology following, recs appreciated  - PTA immunosuppressive agent  >Prednisone 5 mg every morning, 2.5 mg every afternoon; tacrolimus 4 mg every morning, 4mg every afternoon  > Monitor tacro levels, goal 7-9  > - overnight oximetry study suggestive of O2 vs CPAP need, as did require up to 2LPM overnight to prevent hypoxia  > Attempted to use HFNC for PEEP to improve pCO2 on VBG, however this was much worse in AM on 2/24, so HNFC discontinued -- ? If increased O2 decreased resp drive and led to increased CO2 retention  - avoid HFNC, maintain minimum oxygen to keep SaO2 >85%  - PTA dapsone MWF for PJP prophylaxis  - completed course of zosyn for empiric HAP course (2/17 - 2-23)  - Initial decompensation likely from opioids - limit medications that would depress respiration   - port culture per transplant ID rec- NGTD  - awaiting metabolic CART study      Antibiotics:    Vancomycin (2/17 - 2/17)  Zosyn (2/17 - 2/23)  Dapsone MWF, PJP ppx   Micafungin (2/18- 2/21)    Immunosuppression  Tacrolimus  Prednisone     #Steal physiology of LUE dialysis access fistula  LUE arterial US obtained 2/21 with concern for LUE edema. US of fistula demonstrated steal physiology. Discussed with nephrology, and vascular surgery  consulted on 2/22. Vascular surgery has only minor concerns for steal symptoms and given that the AVF is working well, they are okay with outpatient fistulograms/ venoplasty with wrist brachial index and PPG's, unless new concerns arise.  - plan for outpatient workup as above; nephrology in agreement with outpatient workup.    #Hypoalbuminemia  #Left upper extremity unilateral edema, improving   #Bilateral pedal and ankle edema, improving  Edema due to third spacing due to hypoalbuminemia vs HF. BNP >69772 at admission, Echo on 2/18 shows EF of 55-60% with IVC of <2.1 and collapsing >50% with sniff, normal RA pressure, no significant valvular abnormalities ;  Left upper extremity swelling and  pitting lower extremity edema likely due to hypoalbuminemia improved today. Upper limb duplex USG on 2/18 negative for DVT. Wt went from 43.4 kg on admission to 47.4 kg today. She is urinating but cannot chart as she usually urinates with BM. She reports trending to baseline with urination. She denies dysuria, retention symptoms. USG left arm on 2/21 shows steel physiology and Vascular Surgery consulted. Vascular surgery has only minor concerns for steal symptoms and given that the AVF is working well, they are okay with outpatient fistulograms/ venoplasty with wrist brachial index and PPG's, unless new concerns arise. LUE and LE edema significantly decreased since yesterdays HD. No new tingling/ numbness noticed.  - Continue daily weights  - Strict I/Os  - Elevation and wrapping of only lower legs; no wrapping of Left upper extremity with dialysis fistula    #ESRD on HD T/Th/Sat  #Acute on chronic Anemia 2/2 ESRD  #Hypervolemic hyponatremia  #Anion gap metabolic acidosis - resolved  Patient is ESRD on T/Th/Sat HD, Hgb stabilizing. HD tolerating well. Continuing monitoring electrolytes daily. Anion gap normal since 2/21. Will continue to monitor daily labs/ABG  for refeeding syndrome and acidosis  - Continue Venofer injections     - CBC and CMP daily  - Continue epogen dose 8000 units as per nephrology  - Strict I/Os  - HD T/TH/Sat per nephrology    #Facial and neck bruising  #Pain  Reports soreness in her neck from lying in bed. No soreness in the buttocks/ back. Patient has multiple bruises over her arms and face which is attributed to previous falls. No new bruising reported today. Patients reports her headaches are improving with tylenol. Using heating pads and lidocaine patch for body pains. Couple of small skin tears noted by the Rn's. Skin care done accordingly.  - Tylenol Q4  - Lidocaine patch prn  - Heating pads prn  - Diligent skin cares    #HTN  #A-fib, resolved  Noted atrial fibrillation on arrival with HR elevated at 150, not sustained for > 10 minutes and otherwise hemodynamically stable. Rates stable in the 80's. BP normalizing since 2/22.  - PTA metoprolol 25mg BID   - Continuous telemetry    # Encephalopathy secondary to hypercarbia - resolved  Patient was progressively more lethargic on 2/17 during admission in [facility]. Etiology likely secondary to hypercarbia in context of respiratory failure as patient was noted to have high CO2s concomitant with lethargy. Though receiving oxycodone and fentanyl, lethargy was only partially alleviated by narcan. LFTs and ammonia wnl with low suspicion of hepatic encephalopathy. BUN wnl with low suspicion for uremic encephalopathy. Head CT on 2/18 was negative with low suspicion of acute intracranial pathology. Patient is awake, alert, oriented and following commands since 2/20.       # Right hip fracture s/p ORIF (December 2023)   - PT/OT    # GERD  - PTA PPI    # Low T4  Patient with new low T4 at 0.64 and TSH at 6.5, concerning for new hypothyroidism vs sick thyroid syndrome. TSH with appropriate response, more consistent with elevation in the setting of acute illness, levothyroxine is not indicated at this time, will monitor for symptom development. Consider repeat testing  in outpatient setting once patient no longer acutely ill.     # Hx EBV viremia   # Hypogammaglobulinemia  # Chronic immunosuppression  Patient has been afebrile and has not had leukocytosis however she is under immunosuppression. Given acute respiratory failure and hx of recurrent pneumonias, she was started on empiric abx for concerns over new pneumonia. RVP and cultures no growth to date. Last day of empirical treatment for HAP.  - EBV ordered - 27K (decreased compared to prior)     Lines/tubes/drains:  - CVC RIJ (for TPN, is tunneled line)   - PIV x2  - PEG/J  - HD AV fistula, left arm        Diet:   parenteral nutrition - ADULT compounded formula at 43 mL/hr    Regular Diet Adult:    Calorie Counts starting    Adult Formula Drip Feeding: Continuous [employer] Peptide 1.5; Jejunostomy; Goal Rate: Begin at 10 mL/hr and hold; mL/hr; Do not advance tube feeding rate unless K+ is = or > 3.0, Mg++ is = or > 1.5, and Phos is = or > 1.9       General Cares/Prophylaxis:    DVT Prophylaxis: Heparin SQ  GI Prophylaxis: PPI  Fluids: None  Code Status: Full    Clinically Significant Risk Factors              # Hypoalbuminemia: Lowest albumin = 2.6 g/dL at 2/18/2024  5:13 AM, will monitor as appropriate             # Severe Malnutrition: based on nutrition assessment    # Financial/Environmental Concerns: none              Disposition Plan:    Expected Discharge Date: 2/26/24  Destination: TCU/ home with help/services  Discharge Comments:           The patient's care was discussed with the Attending Physician, Dr. Escobar.     Julia Gilliland MD  Internal Medicine Resident, PGY-1  Medicine Service, [facility] TEAM 1  [facility]  Securely message with [employer] (more info)  Text page via [employer] Paging/Directory   See signed in provider for up to date coverage information  _________________     Interval History  Nursing notes reviewed, no acute events overnight.  Patient with elevated  "carbon dioxide this AM on VBG, so was placed on high flow nasal cannula.  Patient feels overall very well, she was able to eat much of her breakfast this morning.  Denies any abdominal pain, nausea, vomiting, chest pain, shortness of breath, or confusion. No other concerns at this time.  Has been stable on 1 L nasal cannula as needed.    Physical Exam  /52 (BP Location: Right arm)   Pulse 81   Temp 97.6  F (36.4  C) (Oral)   Resp 16   Ht 1.57 m (5' 1.81\")   Wt 44.8 kg (98 lb 12.3 oz)   SpO2 96%   BMI 18.18 kg/m      General: thin, alert and oriented  HEENT: Normocephalic, bruising on the right face around right orbit with hematoma and right neck.  Neuro: NAD, following commands, a&o x3, appropriately conversational  Pulm/Resp: normal WOB on RA, able to clear secretions, no cough during exam  CV: RRR, warm extremities, 2+ pitting edema in left hand, no edema in right arm or hand, and 1+ pitting edema from bilateral ankles to feet  Abdomen: Non-distended, PEG in place without erythema or drainage  Incisions/Skin: Bruising to face and right forearm.     Labs and Imaging for this encounter reviewed in chart review.       "

## 2024-02-26 ENCOUNTER — APPOINTMENT (OUTPATIENT)
Dept: GENERAL RADIOLOGY | Facility: CLINIC | Age: 64
DRG: 640 | End: 2024-02-26
Attending: NURSE PRACTITIONER
Payer: MEDICARE

## 2024-02-26 ENCOUNTER — APPOINTMENT (OUTPATIENT)
Dept: CT IMAGING | Facility: CLINIC | Age: 64
DRG: 640 | End: 2024-02-26
Payer: MEDICARE

## 2024-02-26 ENCOUNTER — APPOINTMENT (OUTPATIENT)
Dept: PHYSICAL THERAPY | Facility: CLINIC | Age: 64
DRG: 640 | End: 2024-02-26
Attending: INTERNAL MEDICINE
Payer: MEDICARE

## 2024-02-26 LAB
ALBUMIN SERPL BCG-MCNC: 3.3 G/DL (ref 3.5–5.2)
ALP SERPL-CCNC: 78 U/L (ref 40–150)
ALT SERPL W P-5'-P-CCNC: 13 U/L (ref 0–50)
ANION GAP SERPL CALCULATED.3IONS-SCNC: 10 MMOL/L (ref 7–15)
AST SERPL W P-5'-P-CCNC: 19 U/L (ref 0–45)
BACTERIA BLD CULT: NO GROWTH
BASE EXCESS BLDV CALC-SCNC: -3.7 MMOL/L (ref -3–3)
BASOPHILS # BLD AUTO: ABNORMAL 10*3/UL
BASOPHILS # BLD MANUAL: 0.1 10E3/UL (ref 0–0.2)
BASOPHILS NFR BLD AUTO: ABNORMAL %
BASOPHILS NFR BLD MANUAL: 1 %
BILIRUB SERPL-MCNC: <0.2 MG/DL
BUN SERPL-MCNC: 53.2 MG/DL (ref 8–23)
CALCIUM SERPL-MCNC: 9 MG/DL (ref 8.8–10.2)
CHLORIDE SERPL-SCNC: 103 MMOL/L (ref 98–107)
CREAT SERPL-MCNC: 3.55 MG/DL (ref 0.51–0.95)
CRYPTOC AG SPEC QL: NEGATIVE
DEPRECATED HCO3 PLAS-SCNC: 26 MMOL/L (ref 22–29)
EGFRCR SERPLBLD CKD-EPI 2021: 14 ML/MIN/1.73M2
EOSINOPHIL # BLD AUTO: ABNORMAL 10*3/UL
EOSINOPHIL # BLD MANUAL: 0.2 10E3/UL (ref 0–0.7)
EOSINOPHIL NFR BLD AUTO: ABNORMAL %
EOSINOPHIL NFR BLD MANUAL: 3 %
ERYTHROCYTE [DISTWIDTH] IN BLOOD BY AUTOMATED COUNT: 21 % (ref 10–15)
GLUCOSE SERPL-MCNC: 159 MG/DL (ref 70–99)
HCO3 BLDV-SCNC: 29 MMOL/L (ref 21–28)
HCT VFR BLD AUTO: 29.4 % (ref 35–47)
HGB BLD-MCNC: 8.6 G/DL (ref 11.7–15.7)
IMM GRANULOCYTES # BLD: ABNORMAL 10*3/UL
IMM GRANULOCYTES NFR BLD: ABNORMAL %
INR PPP: 0.97 (ref 0.85–1.15)
LYMPHOCYTES # BLD AUTO: ABNORMAL 10*3/UL
LYMPHOCYTES # BLD MANUAL: 0.8 10E3/UL (ref 0.8–5.3)
LYMPHOCYTES NFR BLD AUTO: ABNORMAL %
LYMPHOCYTES NFR BLD MANUAL: 11 %
MAGNESIUM SERPL-MCNC: 2.2 MG/DL (ref 1.7–2.3)
MAGNESIUM SERPL-MCNC: 2.4 MG/DL (ref 1.7–2.3)
MCH RBC QN AUTO: 37.9 PG (ref 26.5–33)
MCHC RBC AUTO-ENTMCNC: 29.3 G/DL (ref 31.5–36.5)
MCV RBC AUTO: 130 FL (ref 78–100)
MONOCYTES # BLD AUTO: ABNORMAL 10*3/UL
MONOCYTES # BLD MANUAL: 0.8 10E3/UL (ref 0–1.3)
MONOCYTES NFR BLD AUTO: ABNORMAL %
MONOCYTES NFR BLD MANUAL: 12 %
MYELOCYTES # BLD MANUAL: 0.1 10E3/UL
MYELOCYTES NFR BLD MANUAL: 2 %
NEUTROPHILS # BLD AUTO: ABNORMAL 10*3/UL
NEUTROPHILS # BLD MANUAL: 4.9 10E3/UL (ref 1.6–8.3)
NEUTROPHILS NFR BLD AUTO: ABNORMAL %
NEUTROPHILS NFR BLD MANUAL: 71 %
NRBC # BLD AUTO: 0.1 10E3/UL
NRBC # BLD AUTO: 0.2 10E3/UL
NRBC BLD AUTO-RTO: 2 /100
NRBC BLD MANUAL-RTO: 3 %
O2/TOTAL GAS SETTING VFR VENT: 20 %
OXYHGB MFR BLDV: 82 % (ref 70–75)
PCO2 BLDV: 99 MM HG (ref 40–50)
PH BLDV: 7.07 [PH] (ref 7.32–7.43)
PHOSPHATE SERPL-MCNC: 4.9 MG/DL (ref 2.5–4.5)
PHOSPHATE SERPL-MCNC: 5.3 MG/DL (ref 2.5–4.5)
PLAT MORPH BLD: ABNORMAL
PLATELET # BLD AUTO: 288 10E3/UL (ref 150–450)
PO2 BLDV: 56 MM HG (ref 25–47)
POLYCHROMASIA BLD QL SMEAR: ABNORMAL
POTASSIUM SERPL-SCNC: 4.2 MMOL/L (ref 3.4–5.3)
PREALB SERPL-MCNC: 29.7 MG/DL (ref 20–40)
PROCALCITONIN SERPL IA-MCNC: 0.94 NG/ML
PROT SERPL-MCNC: 5.4 G/DL (ref 6.4–8.3)
RBC # BLD AUTO: 2.27 10E6/UL (ref 3.8–5.2)
RBC MORPH BLD: ABNORMAL
SAO2 % BLDV: 83.6 % (ref 70–75)
SODIUM SERPL-SCNC: 139 MMOL/L (ref 135–145)
WBC # BLD AUTO: 6.9 10E3/UL (ref 4–11)

## 2024-02-26 PROCEDURE — 87899 AGENT NOS ASSAY W/OPTIC: CPT | Performed by: NURSE PRACTITIONER

## 2024-02-26 PROCEDURE — 36591 DRAW BLOOD OFF VENOUS DEVICE: CPT | Performed by: NURSE PRACTITIONER

## 2024-02-26 PROCEDURE — 84100 ASSAY OF PHOSPHORUS: CPT

## 2024-02-26 PROCEDURE — 85610 PROTHROMBIN TIME: CPT | Performed by: INTERNAL MEDICINE

## 2024-02-26 PROCEDURE — 99233 SBSQ HOSP IP/OBS HIGH 50: CPT | Performed by: NURSE PRACTITIONER

## 2024-02-26 PROCEDURE — 71250 CT THORAX DX C-: CPT | Mod: 26 | Performed by: RADIOLOGY

## 2024-02-26 PROCEDURE — 250N000009 HC RX 250: Performed by: STUDENT IN AN ORGANIZED HEALTH CARE EDUCATION/TRAINING PROGRAM

## 2024-02-26 PROCEDURE — 84134 ASSAY OF PREALBUMIN: CPT | Performed by: INTERNAL MEDICINE

## 2024-02-26 PROCEDURE — 85014 HEMATOCRIT: CPT

## 2024-02-26 PROCEDURE — 258N000003 HC RX IP 258 OP 636: Performed by: STUDENT IN AN ORGANIZED HEALTH CARE EDUCATION/TRAINING PROGRAM

## 2024-02-26 PROCEDURE — 120N000002 HC R&B MED SURG/OB UMMC

## 2024-02-26 PROCEDURE — 90935 HEMODIALYSIS ONE EVALUATION: CPT

## 2024-02-26 PROCEDURE — 71045 X-RAY EXAM CHEST 1 VIEW: CPT

## 2024-02-26 PROCEDURE — 87385 HISTOPLASMA CAPSUL AG IA: CPT | Performed by: NURSE PRACTITIONER

## 2024-02-26 PROCEDURE — 250N000011 HC RX IP 250 OP 636: Performed by: INTERNAL MEDICINE

## 2024-02-26 PROCEDURE — 82784 ASSAY IGA/IGD/IGG/IGM EACH: CPT | Performed by: NURSE PRACTITIONER

## 2024-02-26 PROCEDURE — 250N000013 HC RX MED GY IP 250 OP 250 PS 637

## 2024-02-26 PROCEDURE — 36591 DRAW BLOOD OFF VENOUS DEVICE: CPT | Performed by: INTERNAL MEDICINE

## 2024-02-26 PROCEDURE — 82565 ASSAY OF CREATININE: CPT | Performed by: INTERNAL MEDICINE

## 2024-02-26 PROCEDURE — 250N000012 HC RX MED GY IP 250 OP 636 PS 637: Performed by: INTERNAL MEDICINE

## 2024-02-26 PROCEDURE — 250N000013 HC RX MED GY IP 250 OP 250 PS 637: Performed by: INTERNAL MEDICINE

## 2024-02-26 PROCEDURE — 36415 COLL VENOUS BLD VENIPUNCTURE: CPT | Performed by: INTERNAL MEDICINE

## 2024-02-26 PROCEDURE — 71250 CT THORAX DX C-: CPT | Mod: MG

## 2024-02-26 PROCEDURE — G1010 CDSM STANSON: HCPCS | Mod: GC | Performed by: RADIOLOGY

## 2024-02-26 PROCEDURE — 83735 ASSAY OF MAGNESIUM: CPT

## 2024-02-26 PROCEDURE — 97110 THERAPEUTIC EXERCISES: CPT | Mod: GP

## 2024-02-26 PROCEDURE — 250N000009 HC RX 250

## 2024-02-26 PROCEDURE — 999N000157 HC STATISTIC RCP TIME EA 10 MIN

## 2024-02-26 PROCEDURE — 85007 BL SMEAR W/DIFF WBC COUNT: CPT

## 2024-02-26 PROCEDURE — 71045 X-RAY EXAM CHEST 1 VIEW: CPT | Mod: 26 | Performed by: RADIOLOGY

## 2024-02-26 PROCEDURE — 82805 BLOOD GASES W/O2 SATURATION: CPT | Performed by: INTERNAL MEDICINE

## 2024-02-26 PROCEDURE — 99232 SBSQ HOSP IP/OBS MODERATE 35: CPT | Mod: GC | Performed by: STUDENT IN AN ORGANIZED HEALTH CARE EDUCATION/TRAINING PROGRAM

## 2024-02-26 PROCEDURE — 99233 SBSQ HOSP IP/OBS HIGH 50: CPT | Mod: FS | Performed by: PHYSICIAN ASSISTANT

## 2024-02-26 PROCEDURE — 84145 PROCALCITONIN (PCT): CPT | Performed by: NURSE PRACTITIONER

## 2024-02-26 PROCEDURE — 250N000009 HC RX 250: Performed by: INTERNAL MEDICINE

## 2024-02-26 PROCEDURE — 97530 THERAPEUTIC ACTIVITIES: CPT | Mod: GP

## 2024-02-26 RX ORDER — SODIUM BICARBONATE 650 MG/1
650 TABLET ORAL 4 TIMES DAILY
Status: DISCONTINUED | OUTPATIENT
Start: 2024-02-26 | End: 2024-02-29

## 2024-02-26 RX ADMIN — HEPARIN SODIUM 5000 UNITS: 5000 INJECTION, SOLUTION INTRAVENOUS; SUBCUTANEOUS at 12:07

## 2024-02-26 RX ADMIN — Medication 40 MG: at 21:57

## 2024-02-26 RX ADMIN — LIDOCAINE 4% 1 PATCH: 40 PATCH TOPICAL at 22:39

## 2024-02-26 RX ADMIN — DAPSONE 50 MG: 100 TABLET ORAL at 09:20

## 2024-02-26 RX ADMIN — SODIUM CHLORIDE 300 ML: 9 INJECTION, SOLUTION INTRAVENOUS at 13:19

## 2024-02-26 RX ADMIN — PREDNISONE 2.5 MG: 2.5 TABLET ORAL at 21:57

## 2024-02-26 RX ADMIN — METOPROLOL TARTRATE 25 MG: 100 TABLET, FILM COATED ORAL at 21:55

## 2024-02-26 RX ADMIN — SODIUM BICARBONATE 650 MG: 650 TABLET ORAL at 21:57

## 2024-02-26 RX ADMIN — SODIUM CHLORIDE 250 ML: 9 INJECTION, SOLUTION INTRAVENOUS at 13:19

## 2024-02-26 RX ADMIN — SODIUM BICARBONATE 650 MG: 650 TABLET ORAL at 11:53

## 2024-02-26 RX ADMIN — HEPARIN SODIUM 5000 UNITS: 5000 INJECTION, SOLUTION INTRAVENOUS; SUBCUTANEOUS at 00:23

## 2024-02-26 RX ADMIN — ONDANSETRON 4 MG: 2 INJECTION INTRAMUSCULAR; INTRAVENOUS at 22:41

## 2024-02-26 RX ADMIN — METOPROLOL TARTRATE 25 MG: 100 TABLET, FILM COATED ORAL at 09:20

## 2024-02-26 RX ADMIN — MAGNESIUM SULFATE HEPTAHYDRATE: 500 INJECTION, SOLUTION INTRAMUSCULAR; INTRAVENOUS at 21:43

## 2024-02-26 RX ADMIN — CALCIUM CARBONATE 600 MG (1,500 MG)-VITAMIN D3 400 UNIT TABLET 1 TABLET: at 17:00

## 2024-02-26 RX ADMIN — TACROLIMUS 4 MG: 5 CAPSULE ORAL at 09:21

## 2024-02-26 RX ADMIN — LIDOCAINE AND PRILOCAINE: 25; 25 CREAM TOPICAL at 11:58

## 2024-02-26 RX ADMIN — TACROLIMUS 4.5 MG: 5 CAPSULE ORAL at 17:00

## 2024-02-26 RX ADMIN — Medication 40 MG: at 09:20

## 2024-02-26 RX ADMIN — CALCIUM CARBONATE 600 MG (1,500 MG)-VITAMIN D3 400 UNIT TABLET 1 TABLET: at 09:26

## 2024-02-26 RX ADMIN — Medication: at 13:19

## 2024-02-26 RX ADMIN — PREDNISONE 5 MG: 5 TABLET ORAL at 09:20

## 2024-02-26 RX ADMIN — CYANOCOBALAMIN TAB 500 MCG 500 MCG: 500 TAB at 09:20

## 2024-02-26 RX ADMIN — SODIUM BICARBONATE 650 MG: 650 TABLET ORAL at 16:58

## 2024-02-26 RX ADMIN — SMOFLIPID 250 ML: 6; 6; 5; 3 INJECTION, EMULSION INTRAVENOUS at 21:43

## 2024-02-26 ASSESSMENT — ACTIVITIES OF DAILY LIVING (ADL)
ADLS_ACUITY_SCORE: 36

## 2024-02-26 NOTE — PLAN OF CARE
Goal Outcome Evaluation:      Plan of Care Reviewed With: patient    Overall Patient Progress: no changeOverall Patient Progress: no change    Outcome Evaluation: Pt AOx4 with stable vital signs. Denies pain, nausea, and shortness of breath. No stools overnight, LBM 2/25. Continous pulse ox, 2L o2 overnight. Critical spCO2 and pH in am, provider notified. continues to refuse CPAP. Conituous TPN infusing. Slept some between cares but reported sleeping poorly after sleeping a lot during the previous day.

## 2024-02-26 NOTE — PROGRESS NOTES
Nephrology Progress Note  2024         Assessment & Recommendations:   Sofie Rodriguez is a 63 year old year old female with ESKD, COPD s/p b/l lung transplant in 2022 with multiple complications, gastroparesis s/p GJ tube, GIB, chronic diarrhea, recurrent c-diff, FTT with inability to tolerate any tube feeds, R hip fx s/p ORIF 2023, admitted on 2/10 for initiation of TPN/lipids, transferred to ICU on  with worsening mental status and respiratory acidosis in spite of bipap, ultimately intubated on , being treated for PNA, also with volume overload in setting of high volume TPN.     # ESKD - TTS, LUE AVG, 3hr, 45.5kg, [facility], Dr. Andres Fairbanks. She has been losing weight and now even below her recent set EDW.  - HD per TTS schedule, extra UF run today to see if this helps her breathing  - Requires EMLA cream an hour before HD       # Dialysis access: LUE AVG placed 3-4 months ago, per pt. She had arm swelling and a fistulogram a couple months ago and swelling improved but now has redeveloped.  - US with c/f possible steal syndrome  - per vascular surgery, no concern for steal syndrome, ok to go ahead with fistulogram  - given that AVF is working well on dialysis (450 BFR) and at this time IR is only able to perform fistulograms when AVF isn't working well, will defer to OP for management.    # Hypercapnia: VB.07/99/56/29   - intubated  in setting of hypercapnia in spite of bipap, extubated   - ongoing volume off  - pt refuses bipap due to claustrophobia  - agree with bicarb to achieve pH of 7.20, though any bicarb will ultimately convert to CO2, so the respiratory issue will need to be addressed    # Volume/BP: Edema due to third spacing due to hypoalbuminemia. Anuric; on metoprolol soln 25 mg bid  - volume overload on CXR and on exam with 2+ pitting edema   - bed weights are quite variable  - UF goal 3L today  - please chart volume of TPN in the I/O, currently getting ~  "1L per day    # Nutrition: on TPN and regular diet with calorie counts  # FTT  # Acute on chronic diarrhea  - per team, concern is that pt isn't absorbing much PO or tube feeds with diarrhea increasing significantly with increase in tube feeds; thus needing TPN     # Anemia 2/2 ESKD  On Venofer 50 qwk, Mircera last dose 1/9/2024  - hgb 7-8's  - Will continue Venofer 50 mcg q week (Tuesday)  - increase epogen dose from 4000 to 8000 units (starting 2/20)    # BMD : Ca 8-9's, phos 4.9, alb 3.3  - renvela is held  - on phos supplement, may need to reduce, will monitor        Recommendations were communicated to primary team via note and phone    RABIA Moctezuma   Division of Renal Disease and Hypertension  [phone]    Interval History :   Seen bedside, worsening hypercapnia, pt refuses bipap due to claustrophobia. Discussed with her that she may end up intubated again if she doesn't wear bipap. She's suprisingly mentally clear at this point. Primary team started PO bicarb given pH 7.07. Pt is on high flow O2, denies n/v, CP, chills. With some conversation, pt is agreeable to UF only run today.     Review of Systems:   4 point ROS neg other than as noted above    Physical Exam:   I/O last 3 completed shifts:  In: 170 [NG/GT:170]  Out: -    /51 (BP Location: Right arm)   Pulse 100   Temp 98.2  F (36.8  C) (Oral)   Resp 16   Ht 1.57 m (5' 1.81\")   Wt 44.8 kg (98 lb 12.3 oz)   SpO2 98%   BMI 18.18 kg/m       GENERAL APPEARANCE: alert  PULM: on high flow O2  CV: regular rhythm, normal rate, no rub     -2+ pitting edema lower extremities, (LUE (fistula arm) > RUE)  GI: soft   INTEGUMENT: no cyanosis, no rash  NEURO: a/o3  Access Left AVG     Labs:   All labs reviewed by me  Electrolytes/Renal -   Recent Labs   Lab Test 02/26/24  0611 02/25/24  1553 02/25/24  0548 02/24/24  0558     --  135 141   POTASSIUM 4.2  --  4.0 3.8   CHLORIDE 103  --  101 105   CO2 26  --  26 24   BUN 53.2*  --  35.0* 46.6* "   CR 3.55*  --  2.36* 2.94*   *  --  124* 114*   ESTUARDO 9.0  --  8.7* 8.8   MAG 2.2 2.0 1.8 2.0   PHOS 4.9* 3.8 3.9 4.7*       CBC -   Recent Labs   Lab Test 02/26/24  0611 02/25/24  0548 02/24/24  0558   WBC 6.9 6.8 5.6   HGB 8.6* 8.8* 8.2*    263 269       LFTs -   Recent Labs   Lab Test 02/26/24  0611 02/25/24  0548 02/24/24  0558   ALKPHOS 78 66 66   BILITOTAL <0.2 0.2 0.2   ALT 13 12 11   AST 19 25 25   PROTTOTAL 5.4* 5.3* 5.2*   ALBUMIN 3.3* 3.1* 3.1*       Iron Panel -   Recent Labs   Lab Test 09/26/22  0555 09/03/22  1039 08/24/22  0810   IRON 54 21* 41   IRONSAT 22 9* 21   CARLOS 769* 343* 334*         Imaging:  All imaging studies reviewed by me.     Current Medications:   calcium carbonate-vitamin D  1 tablet Per J Tube TID w/meals    cyanocobalamin  500 mcg Per Feeding Tube Daily    dapsone  50 mg Per J Tube Q Mon Wed Fri AM    heparin ANTICOAGULANT  5,000 Units Subcutaneous Q12H    lidocaine  1 patch Transdermal Q24h    lipids 4 oil  250 mL Intravenous Once per day on Monday Wednesday Friday Saturday    metoprolol  25 mg Per J Tube BID    pantoprazole  40 mg Per J Tube BID    predniSONE  5 mg Per J Tube QAM    And    predniSONE  2.5 mg Per J Tube QPM    [Held by provider] sevelamer carbonate (RENVELA)  0.8 g Oral BID    sodium bicarbonate  650 mg Oral 4x Daily    tacrolimus  4 mg Per J Tube QAM    And    tacrolimus  4.5 mg Per J Tube QPM      dextrose      heparin (porcine) 500 Units/hr (02/24/24 0839)    parenteral nutrition - ADULT compounded formula CYCLE      parenteral nutrition - ADULT compounded formula 43 mL/hr at 02/25/24 2111    sodium chloride 0.9%       RABIA Moctezuma

## 2024-02-26 NOTE — PROGRESS NOTES
CLINICAL NUTRITION SERVICES - REASSESSMENT NOTE     Nutrition Prescription    RECOMMENDATIONS FOR MDs/PROVIDERS TO ORDER:  None currently    Malnutrition Status:    Severe malnutrition in the context of chronic illness/disease    Recommendations already ordered by Registered Dietitian (RD):  Dosing weight:  43.4 kg  Access: central  parameters (per day)  Volume:  1080 mL  Dextrose: 195 g  AA: 65 g  Lipids: 250 mL SMOF 20%, 4 days per week   Additives: infuvite, tralement  Goal PN provides 195 g dextrose, 52 g AA, and 250 mL 20% lipids 4 days per week for total provision of 1209 Kcals (28 Kcals/kg), 1.5 g/kg protein, GIR 3.1 mg/kg/minute, and 24% fat kcals on average daily.   Can consider cycling as appropriate, GIR for 18 hr cycle of ~ 4.2 mg/kg/minute and for 12 hour cycle of ~6.2 mg/kg/minute    Future/Additional Recommendations:  Monitor PO adequacy  Monitor meds, labs, wts, GI symptoms  Monitor TPN adequacy/tolerance/appropriateness     EVALUATION OF THE PROGRESS TOWARD GOALS   Diet: Regular    Intake/Tolerance: Patient consuming  % of 1-2 meal(s) daily.   2/23       Total Kcals: 1539     Total Protein: 52g   2/22       Total Kcals: 761       Total Protein: 17g    2/21       Total Kcals: 565       Total Protein: 24g   Nutrition support:  TF: discontinued  TPN: 1080 ml, 250 g dextrose, 60 g AA, 20% lipids 3x week      Obtained metabolic cart study 2/24 @ 1557 with the following results: MREE = 1185 kcals/day (equiv to 27.3 kcal/kg/day) with RQ = 1.15.  Pt received 0 ml of TF + 1032 ml TPN +PO(see kcal count above) in 24 hours preceding the study providing 30 kcals (110 % MREE).  RQ is not within physiologic range; RQ is logical given provisions eceived prior to study.  Would aim energy intakes minimally at 90% of this MREE (equiv to 25 kcal/kg/day).    NEW FINDINGS   Room visit. Pt reports a lack of appetite but is willing to order breakfast. Assisted with ordering. Denies N/V currently. Reports  intermittent diarrhea/constipation. Reports LBM 2/25 and reports 2 BM's on 2/25. Stools documented as loose, watery, green. Denies issues chewing/swallowing. Declined protein shakes. TPN hanging at time of visit. Team ordered met cart study d/t rising pCO2. Additionally noted rising BUN/cre and elevated phos. Discussed cutting back on TPN to meet lower end of estimated protein needs with PharmD. Per PharmD, team worried patient is not absorbing nutrition. G-J clamped- no output documented. Per I/O's, 2 stool occurrences documented 2/24 and 5 on 2/23. Will continue to monitor GI symptoms/signs of malabsorption.     GI:  Last BM: 02/25/24  PRN bowel med(s)    Weight:  Most Recent Weight: 44.8 kg (98 lb 12.3 oz)  on 2/24/24 via Bed scale  Body mass index is 18.18 kg/m .  Wt up 1.4 kg from admission. I/O's -38L since admit    Meds:  Caltrate  Vit B12  Protonix  Prednisone  Renvela  Tacro      Labs:  BUN 53.2  Cre 3.55  GFR 14  Phos 4.9  Glu 159    Skin:  Reviewed  G-J tube- clamped    MALNUTRITION  % Intake: </= 50% for >/= 5 days (severe)  % Weight Loss: > 20% in 1 year (severe)  Subcutaneous Fat Loss: Global severe  Muscle Loss: Global severe  Fluid Accumulation/Edema: Moderate  Malnutrition Diagnosis: Severe malnutrition in the context of chronic disease    Previous Goals   Total avg nutritional intake to meet a minimum of 35 kcal/kg and 1.2 g PRO/kg daily (per dosing wt 43.4 kg).    Evaluation: Met    Previous Nutrition Diagnosis  Inadequate enteral nutrition infusion related to nausea, gastroparesis as evidenced by pt self report limiting infusions, 30% wt loss x 1 year, muscle and fat wasting    Evaluation: No longer applicable, nutrition diagnosis changed below    CURRENT NUTRITION DIAGNOSIS  Inadequate oral intake related to decreased appetite/gastroparesis as evidenced by pt report, TPN to help meet nutritional needs      INTERVENTIONS  Implementation  Collaboration with other providers  Parenteral Nutrition/IV  Fluids - Modify composition     Goals  Total avg nutritional intake to meet a minimum of 25 kcal/kg and 1.2 g PRO/kg daily (per dosing wt 43.4 kg).      Monitoring/Evaluation  Progress toward goals will be monitored and evaluated per protocol.    Nimco Lion MS, RD, LD, [facility]    6C (beds 7709-1385) + 7C (beds 0180-2258) + ED   RD pager: [phone]  Weekend/Holiday RD pager: [phone]

## 2024-02-26 NOTE — PROGRESS NOTES
Patient A & O X 4, able to make needs known. Denies pain. Received schedule medications as  ordered . Continue on TPN. PICC patent . Patient did not eat . Patient left to HD via bed accompanied by transport.. Patient on high flow NC at 40% . Report  given to HD RN. Continue carry on as order put in and update MD with change.

## 2024-02-26 NOTE — PROGRESS NOTES
RT was consulted to put pt on HFNC. Pt's CO2 is currently 99 and on 2LNC. RT expressed the importance of wearing BIPAP to help correct CO2 levels. Pt declined this saying that they are claustrophobic. Pt was then placed on HFNC 35L 40%. Desats really quickly to the low 80s when switching devices. RN notified.     Gary Min, RT on 2/26/2024 at 10:34 AM

## 2024-02-26 NOTE — PROGRESS NOTES
Pulmonary Medicine  Cystic Fibrosis - Lung Transplant Team  Progress Note  2024     Patient: Sofie Rodriguez  MRN: [id number]  : 1960 (age 63 year old)  Transplant: 2022 (Lung), POD#[id number]  Admission date: 2/10/2024    Assessment & Plan:     Sofie Rodriguez is a 63 year old female with h/o bilateral lung transplant for COPD on 22 with course complicated by post-operative hemidiaphragm palsy, recurrent PNAs, positive DSA, EBV viremia, hypogammaglobulinemia, severe gastroparesis s/p G/J tube placement 22 with pyloric botox 23, GI bleed  pyloric ulcer, hemobilia s/p ERCP and MRCP, chronic diarrhea, recurrent C diff colitis, and ESRD on HD. Admitted May 2023 for FTT.  Admitted to OSH - in December for right hip fracture s/p ORIF, now with significant deconditioning and ongoing severe malnutrition with gastroparesis, small bowel hypermotility and failure to tolerate any tube feeds. After extensive discussion with the dietician, GI and ultimately convincing the patient, the patient was admitted on 2/10 for initiation of TPN/lipids (s/p port placement) with persistent pain at port site. Initially on trickle feeds (per GI) for maintaining bowel (stopped with increased PO intake) with recommendation for CT enterography to look for structural abnormalities (unable due to large bolus enteral contrast required for the study). She was transferred to the ICU on  with worsening mental status and respiratory acidosis despite BiPAP, ultimately requiring intubation and mechanical ventilation. CT chest concerning for new infection vs volume overload and started on empiric antimicrobial therapy,  extubated  and infectious work up is negative to date so antimicrobials stopped. Tolerating TPN and small amt of PO intake. Persistent/progressive (asymptomatic) hypercapnia.     Recommendations:   - Bronch cultures () with candida glabrata (likely colonization) but otherwise NGTD,  follow  - Recommend repeat CT chest to better reevaluate opacities vs edema  - Daily VBG ordered through 3/3 to follow hypercapnia  - Check metabolic cart when able (needs to be on RA) to assess etiology of increasing CO2  - Procalcitonin, histoplasma/cryptococcal Ag (blood) ordered  - Will consider restarting ABX/antifungals pending repeat CT, infectious work up and clinical course; deferred today  - IgG ordered  - Use minimum O2 to keep SaO2 >85% in an effort to maintain resp drive.  - Recommend sleep clinic eval as soon as possible after discharge.  - Tacrolimus goal level decreased d/t recent immuknow assay and concern for infection.  Continue current dose and repeat level ordered 2/28  - Repeat immuknow assay ordered 2/27  - Prospera (2/18 pending) to evaluate significance of positive DSA   - Repeat EBV in one month (~3/18)  - Dialysis as per nephrology, agree with extra run 2/26 for volume removal, and  PO sodium bicarb started for acidosis  - Close monitoring electrolytes as patient is high risk for refeeding syndrome  - TF on hold for now while monitoring calorie counts (ordered 2/27-2/29)     S/p bilateral lung transplant:   Right hemidiaphragm palsy:   Suspected CARLEE:  New consolidated nodular opacities and GGO concerning for infection:    Persistent hypercapnia: Severely deconditioned. CMV 2/19 negative. CT 2/7 with decreased MARLON opacities, new tree in bud RLL opacities without new symptoms. PFTs unchanged in clinic (but ATS not met), remain significantly below her baseline (1.4-1.5L). Noted increased dyspnea since the Port-A-Cath placement.  Review of notes indicate patient should be on 2 L o2 at night from prior overnight O2 study in Jan 2023. Also reported a slight increase in cough. She was requiring O2 for comfort only but decompensated on 2/17 with worsening encephalopathy, respiratory acidosis (pCO2 up to 106). CT chest 2/17 showed very patchy and new consolidative, nodular opacities, GGO primarily  in the bases and intralobular septal thickening concerning for pulmonary edema and volume overload along with new, possibly fungal vs. Atypical infection vs. PTLD (less likely but in the differential) vs. Septic emboli.  Based on negative infectious work up at this time, would favor volume overload in the setting of initiation of TPN. Bronch with lavage of RML 2/18 showed very friable tissue. S/p empiric Zosyn (2/17-2/24) and micafungin 2/18-2/21. Continued clinical improvement with increased nutrition and dialysis although with persistent/progressive CO2 retention, etiology unclear, no improvement with HFNC. Clinically does NOT appear to need intubation/ventilation.  May be a component of overfeeding (awaiting metabolic cart study as below).  Also appears to be an issue with drive since the patient feels well with VBG (7.14/89) on 2/25, more lethargic on 2/26 at 7.07/99. Suspect chronic, untreated sleep apnea. Overnight oximetry 2/24 with 10:14 (min:sec) < 88% (O2 initiated at 0046, increased to 2L at 3:40).  Continues to adamantly refuse BiPAP despite encouragement -> tolerating HFNC 2/26 d/t hypercapnia.  - Bronch cultures (2/18) with candida glabrata (likely colonization) but otherwise NGTD, follow  - CXR (2/26) with Hazy attenuation throughout the lungs likely representing pulmonary edema (L>R), Small left pleural effusion (personally reviewed with Dr. Gong)  - Recommend repeat CT chest to better reevaluate opacities vs edema  - Daily VBG ordered through 3/3 to follow hypercapnia  - Check metabolic cart when able (needs to be on RA) to assess etiology of increasing CO2  - Procalcitonin ordered  - Histoplasma/cryptococcal Ag (blood) ordered  - IgG ordered  - Will consider restarting ABX/antifungals pending repeat CT, infectious work up and clinical course; deferred today  - Volume removal per nephrology, additional volume removal 2/26 as below  - Recommend conservative approach to supplemental O2 with goal SpO2  >85% and use the minimum O2 required to maintain saturation to avoid blunting any hypoxic ventilatory drive.  - Recommend sleep clinic eval as soon as possible after discharge.     Immunosuppression:  ImmuKnow 108 and cfDNA 0.12 on 1/10. AZA stopped 5/2023 d/t low immuknow assay and EBV viremia.  - Tacrolimus 4 mg qAM/ 4.5 mg qPM.  Goal level 6-8 (decreased 2/26 per Dr. Gong d/t immuknow assay and concern for infection).  Continue current dose and repeat level ordered 2/28  - Continue Prednisone 5 mg/2.5 mg  - Repeat immuknow assay ordered 2/27     Prophylaxis:   - Dapsone 50 mg q MWF for PJP ppx     Positive DSA: no prior treatment for AMR, has been watched for quite some time given no pulmonary symptoms, prior PFTs stability and significant and ongoing failure to thrive. Last DSA improved to 5723, however will ongoing lower PFTs, may need to consider AMR treatment, however, unclear how she would tolerate. DSA 2/7 with persistent DQ B2, MFI increased to 6966.  - Repeat DSA in one month (due 3/6)  - Prospera (2/18 pending) to evaluate significance of positive DSA      ESRD: Dialysis dependent.  Suspect her respiratory symptoms were volume overload secondary to initiation of TPN.  CT chest suggesting volume overload. Has undergone multiple days in a row of dialysis to try to pull fluid  -Dialysis as per nephrology, agree with extra run 2/26 for volume removal, and  PO sodium bicarb started for acidosis     EBV viremia: Most recent CT c/a/p (2/7) without adenopathy. Most recent level 2/18 improved to 27k (log 4.4) on 2/18 from 96K (2/7)  - Repeat EBV in one month (~3/18)      Severe protein calorie malnutrition:  FTT:   Gastroparesis s/p PEG/J s/p botox and G-POEM:   SB Hypomotility  Pyloric ulcer:  Chronic nausea and osmotic diarrhea:  SIBO s/p rifaximin:   Recurrent C diff colitis: chronic diarrhea since transplant with recurrent episodes of C diff. GI previously consulted, felt stools consistent with osmotic  diarrhea. Over the last year has lost 40 lbs, unable to tolerate any combination of TF, most recently elemental formula. Normal fecal elastase last May. Following with Dr. Navarro who feels her main two issues are vagal injury induced gastroparesis and probably small bowel hypomotility. Her intolerance to J-tube feeds suggests the latter to be a significant issue (notes in a message that there are no motility experts at the  and he is limited in what can be offered). Now s/p port placement with TPN and lipids. GI recommending CT to assess for structural issues. Now taking modest PO.  - Close monitoring electrolytes as patient is high risk for refeeding syndrome  - Management per primary and GI  - Concern that patient will not tolerate CT enterography according to chart notes as she will require 1500 mL enteral contrast bolus which she is unlikely to tolerate given her gastroparesis and reduced bowel function.  - TF on hold for now while monitoring calorie counts (ordered 2/27-2/29)     We appreciate the excellent care provided by the [place] [id number] team.  Recommendations communicated via phone and this note.  Will continue to follow along closely, please do not hesitate to call with any questions or concerns.    Patient discussed with Dr. Farideh Grayson, APRN, CNP   Inpatient Nurse Practitioner  Pulmonary CF/Transplant     Subjective & Interval History:     More drowsy today, although easily arouse able and oriented. Continues to refuse BiPAP, was agreeable for HFNC. On 1-3L NC. Denies SOB. Occasional minimally productive cough is unchanged. Denies GI symptoms, loose stools stable. Tolerating increasing ambulation, limited by weakness and hip pain     Review of Systems:     C: No fever, no chills, no change in weight, no change in appetite  INTEGUMENTARY/SKIN: No rash or obvious new lesions  ENT/MOUTH: No sore throat, no sinus pain, no nasal congestion or drainage  RESP: See interval history  CV: No  "chest pain, no palpitations, no peripheral edema, no orthopnea  GI: No nausea, no vomiting, no change in stools, no reflux symptoms  : No dysuria  MUSCULOSKELETAL: see interval history  ENDOCRINE: Blood sugars with adequate control  NEURO: No headache  PSYCHIATRIC: Mood stable    Physical Exam:     All notes, images, and labs from past 24 hours (at minimum) were reviewed.    Vital signs:  Temp: 98.5  F (36.9  C) Temp src: Oral BP: 100/56 Pulse: 96   Resp: 18 SpO2: 98 % O2 Device: (S) High Flow Nasal Cannula (HFNC) Oxygen Delivery: 35 LPM Height: 157 cm (5' 1.81\") Weight: 47.1 kg (103 lb 13.4 oz)  I/O:   Intake/Output Summary (Last 24 hours) at 2/26/2024 1355  Last data filed at 2/26/2024 0800  Gross per 24 hour   Intake 250 ml   Output --   Net 250 ml     Constitutional: lying in bed, in no apparent distress.   HEENT: Eyes with pink conjunctivae, anicteric.  Oral mucosa moist without lesions.   PULM: Diminished air flow bilaterally. +fine crackles t/o, no rhonchi, no wheezes.  Non-labored breathing on NC 2L.  CV: Normal S1 and S2.  RRR.  No murmur, gallop, or rub.  +LUE peripheral edema.   ABD: NABS, soft, nontender, nondistended.    MSK: Moves all extremities.  No apparent muscle wasting.   NEURO: Drowsy, awakens easily and conversant.   SKIN: Warm, dry.  No rash on limited exam.   PSYCH: Mood stable.     Data:     LABS    CMP:   Recent Labs   Lab 02/26/24  0611 02/25/24  1553 02/25/24  0548 02/24/24  0558 02/23/24  1622 02/23/24  0423     --  135 141  --  138   POTASSIUM 4.2  --  4.0 3.8  --  3.7   CHLORIDE 103  --  101 105  --  101   CO2 26  --  26 24  --  27   ANIONGAP 10  --  8 12  --  10   *  --  124* 114*  --  125*   BUN 53.2*  --  35.0* 46.6*  --  29.2*   CR 3.55*  --  2.36* 2.94*  --  2.04*   GFRESTIMATED 14*  --  23* 17*  --  27*   ESTUARDO 9.0  --  8.7* 8.8  --  8.6*   MAG 2.2 2.0 1.8 2.0   < > 1.8   PHOS 4.9* 3.8 3.9 4.7*   < > 3.4   PROTTOTAL 5.4*  --  5.3* 5.2*  --  5.1*   ALBUMIN 3.3*  --  " "3.1* 3.1*  --  3.0*   BILITOTAL <0.2  --  0.2 0.2  --  0.2   ALKPHOS 78  --  66 66  --  60   AST 19  --  25 25  --  21   ALT 13  --  12 11  --  8    < > = values in this interval not displayed.     CBC:   Recent Labs   Lab 02/26/24  0611 02/25/24  0548 02/24/24  0558 02/23/24  0423   WBC 6.9 6.8 5.6 5.0   RBC 2.27* 2.34* 2.23* 2.41*   HGB 8.6* 8.8* 8.2* 8.5*   HCT 29.4* 29.9* 27.7* 28.4*   * 128* 124* 118*   MCH 37.9* 37.6* 36.8* 35.3*   MCHC 29.3* 29.4* 29.6* 29.9*   RDW 21.0* 21.0* 21.1* 20.5*    263 269 211       INR:   Recent Labs   Lab 02/26/24  0611   INR 0.97       Glucose:   Recent Labs   Lab 02/26/24  0611 02/25/24  0548 02/24/24  0558 02/23/24  0423 02/22/24  1542 02/22/24  0601   * 124* 114* 125* 145* 144*       Blood Gas:   Recent Labs   Lab 02/26/24  0611 02/25/24  0547 02/24/24  0558   PHV 7.07* 7.14* 7.16*   PCO2V 99* 89* 79*   PO2V 56* 51* 47   HCO3V 29* 30* 28   LINDY -3.7* -0.3 -1.1   O2PER 20 3 1       Culture Data No results for input(s): \"CULT\" in the last 168 hours.    Virology Data:   Lab Results   Component Value Date    FLUAH1 Not Detected 02/18/2024    FLUAH3 Not Detected 02/18/2024    DH3290 Not Detected 02/18/2024    IFLUB Not Detected 02/18/2024    RSVA Not Detected 02/18/2024    RSVB Not Detected 02/18/2024    PIV1 Not Detected 02/18/2024    PIV2 Not Detected 02/18/2024    PIV3 Not Detected 02/18/2024    HMPV Not Detected 02/18/2024       Historical CMV results (last 3 of prior testing):  Lab Results   Component Value Date    CMVQNT Not Detected 02/19/2024    CMVQNT Not Detected 02/07/2024    CMVQNT Not Detected 01/10/2024     Lab Results   Component Value Date    CMVLOG 3.2 07/12/2023    CMVLOG <2.1 04/19/2023    CMVLOG 3.5 01/25/2023       Urine Studies    Recent Labs   Lab Test 02/18/24  0222 05/18/23  0627   URINEPH 7.5* 5.0   NITRITE Negative Negative   LEUKEST Trace* Moderate*   WBCU 66* 21*       Most Recent [place] Pulmonary Function Testing (FVC/FEV1 " only)  FVC-Pre   Date Value Ref Range Status   02/07/2024 1.19 L    01/10/2024 1.12 L    08/29/2023 1.48 L    07/25/2023 1.55 L      FVC-%Pred-Pre   Date Value Ref Range Status   02/07/2024 42 %    01/10/2024 39 %    08/29/2023 53 %    07/25/2023 55 %      FEV1-Pre   Date Value Ref Range Status   02/07/2024 1.13 L    01/10/2024 1.10 L    08/29/2023 1.43 L    07/25/2023 1.54 L      FEV1-%Pred-Pre   Date Value Ref Range Status   02/07/2024 51 %    01/10/2024 49 %    08/29/2023 64 %    07/25/2023 69 %        IMAGING    Recent Results (from the past 48 hour(s))   XR Chest Port 1 View    Narrative    EXAM: XR CHEST PORT 1 VIEW 2/26/2024 10:07 AM    INDICATION: s/p lung transplant, hypercapnia, hypoxia    COMPARISON: Chest radiograph 2/18/2024, chest CT 2/17/2024.    TECHNIQUE: Single portable AP view of the chest.    FINDINGS:   Bilateral lung transplant recipient with intact clam shell sternotomy  wires. Right IJ tunneled central venous catheter terminates at the  high cavoatrial junction. Cardiac silhouette appears enlarged.  Interval extubation. Hazy attenuation throughout the lungs, left  greater than right. Small left pleural effusion. No pneumothorax.      Impression    IMPRESSION:   In this bilateral lung transplant recipient:  1. Hazy attenuation throughout the lungs likely representing pulmonary  edema.  2. Enlarged cardiac silhouette suspicious for possible pericardial  effusion.  3. Small left pleural effusion.  4. Interval extubation.    I have personally reviewed the examination and initial interpretation  and I agree with the findings.    ALVINA HARRY MD         SYSTEM ID:  [id number]

## 2024-02-26 NOTE — PROGRESS NOTES
Care Management Follow Up    Length of Stay (days): 15    Expected Discharge Date: 03/01/2024     Concerns to be Addressed: discharge planning, other (see comments) (New TPN)     Patient plan of care discussed at interdisciplinary rounds: Yes    Anticipated Discharge Disposition: Transitional Care, Home Care, Dialysis Services     Anticipated Discharge Services: None  Anticipated Discharge DME: None    Patient/family educated on Medicare website which has current facility and service quality ratings:    Education Provided on the Discharge Plan: Yes  Patient/Family in Agreement with the Plan: yes    Referrals Placed by CM/SW: External Care Coordination  Private pay costs discussed: Not applicable    Additional Information:  Per my request on Friday [facility] admissions reviewed for appropriateness for LTACH.  Not felt to be appropriate.  Therapies report TCU vs. Home with assist. Today, patient requiring high flow O2 to main sats.  Unclear discharge needs at this time.  Will continue to follow.     JOAN Hannon  Lung Transplant   Phone: [phone] Pager: [phone]

## 2024-02-26 NOTE — PROVIDER NOTIFICATION
Provider notified (name):Julia Marie MD  Reason for notification: Critical Lab  pH 7.07 , PCo2 99  Recommendation/request given to provider:  Response from provider: waiting for response.

## 2024-02-26 NOTE — PROGRESS NOTES
Medicine Progress note      [facility]  Medicine Service, [facility] TEAM 1    Date of Hospital Admission: 2/10/2024  Date of ICU Admission: 2/18/2024  Date of Transfer to Medicine Floor: 2/20/2024  Date of Service (when I saw the patient): 02/26/2024      Assessment & Plan  Sofie Rodriguez is a 63 year old female with PMH COPD s/p bilateral lung transplant 6/28/22 c/b hemidiaphragm palsy and recurrent pneumonias, gastroparesis and small bowel dysmotility complicated by severe malnutrition now s/p PEG/J, ESRD on T/Th/Sat HD, recent R femoral fx s/p ORIF, chronic diarrhea, recurrent c-diff, FTT with inability to tolerate any tube feeds, who [place] [facility] [place] for concerns over malnutrition and TPN initiation via portacath. On 2/17/24 she was transferred to ICU for worsening mental status and acute hypoxic and hypercarbic respiratory failure inspite of BiPAP requiring intubation. She was suspected to have PNA and started on antibiotics. Extubated on 2/19 without complications, now on RA, tolerated iHD on 2/20, and tolerating PO regular diet. Will hold tube feed and monitor calorie counts to determine nutrition plan for discharge. Monitoring VBG in AM due to elevated carbon dioxide, will hold continued use of HFNC.    Changes today:   - metabolic cart study  - VBG in AM for monitoring  - follow up with transplant pulm team re: respiratory acidosis, recs pending  - will start PO bicarb 650 mg QID to try and stabilize pH given persistent worsening on VBG  - pt still refusing BiPAP, not improving with HFNC      # Severe malnutrition   # FTT   # Gastroparesis, small bowel dysmotility  # S/P PEG/J with intolerance of enteral nutrition   Patient with gastroparesis (presumed due to vagal injury) and small bowel dysmotility complicated by unintentional 40lb weight loss over the past year and now severe malnutrition. Previously intolerant to oral food intake  due to nausea. Was initially admitted for portacath and TPN initiation since 2/13. After extubation has been able to tolerate feeds without n/v from 2/20. Electrolytes trending towards baseline today.  Per GI, next steps for workup for malnutrition would include CT enterography to evaluate for anatomic abnormalities contributing to malnutrition vs possible treatment for SIBO (though patient has had multiple courses of treatment in the past with no long term improvement). Patient couldn't tolerate the oral load for CT enterography on 2/21, so will defer this until she is able to tolerate greater PO load. On 2/23 , again discussed with patient the need of small bowel motility study if not improving outpatient through [place]. No ongoing concerns for SIBO on 2/23 as patient is passing multiple episodes of stools and gas with no abdominal pain or distention. C-diff test negative on 2/21. Per RD, patient tolerating >300 kcal of intake PO currently, so stopped trickle Tube feeds and continuing with PO and TPN for nutrition.   - Regular diet + increased TPN +  stopping feeds TF  per RD & transplant pul discussion               - Nephrology: limit fluid < 1.5 L per day for TPN ( chart vol of TPN in the I/O)   - RD concerned pt is not absorbing any oral intake and they will be monitoring Bowel function, I/O and, PO/TF/TPN intake.   -  stopped tube feeds for now with >300 kcal in PO intake, and monitor per discussion with transplant pulm, continue with TPN for majority of nutrition needs  - Continue calorie count  - CT enterography with contrast- Pt not able to tolerate oral contrast load of 1500 ml on 2/21; no ongoing concerns for SIBO, would need small bowel motility study if not improving outpatient through [place]  - Daily Weights  - monitor CMP in the AM    #Severe respiratory acidosis  #Acute hypoxic and hypercarbic respiratory failure  #S/P Lung transplant 2022  #Recurrent pneumonia   Patient received a bilateral lung  transplant 6/28/22 for COPD. Was admitted 2/10 to address malnutrition   and admitted to ICU on 2/17 with hypoxic hypercarbic respiratory failure. Intubated on 2/18 AM  due to worsening oxygen requirements, likely due to pulmonary edema given hx of ESRD requiring HD vs infection given hx of lung transplant, immunosuppressed status, and recurrent pneumonias. Extubated on 2/19 without complications,   Micro Follow-up on BAL, viral panel, CMV, fungus, and Blood Cultures show no abnormalities. Does have slowly rising pCO2 on VBG - HFNC did not seem to improve elevated pCO2.  Patient likely requiring BiPAP, although patient is not agreeable.  Awaiting metabolic CART study to evaluate rising pCO2  - will start PO sodium bicarb 650 mg QID to stabilize pH for now - cleared this with nephrology   - patient continues to refuse BiPAP due to claustrophobia  - PRN hypertonic saline inhaler with albuterol nebs  - Continue good pulm toilet  - Transplant pulmonology following, recs appreciated  - PTA immunosuppressive agent  >Prednisone 5 mg every morning, 2.5 mg every afternoon; tacrolimus 4 mg every morning, 4mg every afternoon  > Monitor tacro levels, goal 7-9  > - overnight oximetry study suggestive of O2 vs CPAP need, as did require up to 2LPM overnight to prevent hypoxia  > Attempted to use HFNC for PEEP to improve pCO2 on VBG, however this was much worse in AM on 2/24, so HNFC discontinued -- ? If increased O2 decreased resp drive and led to increased CO2 retention  - avoid HFNC, maintain minimum oxygen to keep SaO2 >85% - pending recs from transplant pulm  - PTA dapsone MWF for PJP prophylaxis  - completed course of zosyn for empiric HAP course (2/17 - 2-23)  - Initial decompensation likely from opioids - limit medications that would depress respiration   - port culture per transplant ID rec- [id number]  - awaiting metabolic CART study      Antibiotics:    Vancomycin (2/17 - 2/17)  Zosyn (2/17 - 2/23)  Dapsone MWF, PJP ppx    Micafungin (2/18- 2/21)    Immunosuppression  Tacrolimus  Prednisone     #Steal physiology of LUE dialysis access fistula  LUE arterial US obtained 2/21 with concern for LUE edema. US of fistula demonstrated steal physiology. Discussed with nephrology, and vascular surgery consulted on 2/22. Vascular surgery has only minor concerns for steal symptoms and given that the AVF is working well, they are okay with outpatient fistulograms/ venoplasty with wrist brachial index and PPG's, unless new concerns arise.  - plan for outpatient workup as above; nephrology in agreement with outpatient workup.    #Hypoalbuminemia  #Left upper extremity unilateral edema, improving   #Bilateral pedal and ankle edema, improving  Edema due to third spacing due to hypoalbuminemia vs HF. BNP >73240 at admission, Echo on 2/18 shows EF of 55-60% with IVC of <2.1 and collapsing >50% with sniff, normal RA pressure, no significant valvular abnormalities ;  Left upper extremity swelling and  pitting lower extremity edema likely due to hypoalbuminemia improved today. Upper limb duplex USG on 2/18 negative for DVT. Wt went from 43.4 kg on admission to 47.4 kg today. She is urinating but cannot chart as she usually urinates with BM. She reports trending to baseline with urination. She denies dysuria, retention symptoms. USG left arm on 2/21 shows steel physiology and Vascular Surgery consulted. Vascular surgery has only minor concerns for steal symptoms and given that the AVF is working well, they are okay with outpatient fistulograms/ venoplasty with wrist brachial index and PPG's, unless new concerns arise. LUE and LE edema significantly decreased since yesterdays HD. No new tingling/ numbness noticed.  - Continue daily weights  - Strict I/Os  - Elevation and wrapping of only lower legs; no wrapping of Left upper extremity with dialysis fistula    #ESRD on HD T/Th/Sat  #Acute on chronic Anemia 2/2 ESRD  #Hypervolemic hyponatremia  #Anion gap  metabolic acidosis - resolved  Patient is ESRD on T/Th/Sat HD, Hgb stabilizing. HD tolerating well. Continuing monitoring electrolytes daily. Anion gap normal since 2/21. Will continue to monitor daily labs/ABG  for refeeding syndrome and acidosis  - Continue Venofer injections    - CBC and CMP daily  - Continue epogen dose 8000 units as per nephrology  - Strict I/Os  - HD T/TH/Sat per nephrology    #Facial and neck bruising  #Pain  Reports soreness in her neck from lying in bed. No soreness in the buttocks/ back. Patient has multiple bruises over her arms and face which is attributed to previous falls. No new bruising reported today. Patients reports her headaches are improving with tylenol. Using heating pads and lidocaine patch for body pains. Couple of small skin tears noted by the Rn's. Skin care done accordingly.  - Tylenol Q4  - Lidocaine patch prn  - Heating pads prn  - Diligent skin cares    #HTN  #A-fib, resolved  Noted atrial fibrillation on arrival with HR elevated at 150, not sustained for > 10 minutes and otherwise hemodynamically stable. Rates stable in the 80's. BP normalizing since 2/22.  - PTA metoprolol 25mg BID   - Continuous telemetry    # Encephalopathy secondary to hypercarbia - resolved  Patient was progressively more lethargic on 2/17 during admission in [facility]. Etiology likely secondary to hypercarbia in context of respiratory failure as patient was noted to have high CO2s concomitant with lethargy. Though receiving oxycodone and fentanyl, lethargy was only partially alleviated by narcan. LFTs and ammonia wnl with low suspicion of hepatic encephalopathy. BUN wnl with low suspicion for uremic encephalopathy. Head CT on 2/18 was negative with low suspicion of acute intracranial pathology. Patient is awake, alert, oriented and following commands since 2/20.       # Right hip fracture s/p ORIF (December 2023)   - PT/OT    # GERD  - PTA PPI    # Low T4  Patient with new low T4 at  0.64 and TSH at 6.5, concerning for new hypothyroidism vs sick thyroid syndrome. TSH with appropriate response, more consistent with elevation in the setting of acute illness, levothyroxine is not indicated at this time, will monitor for symptom development. Consider repeat testing in outpatient setting once patient no longer acutely ill.     # Hx EBV viremia   # Hypogammaglobulinemia  # Chronic immunosuppression  Patient has been afebrile and has not had leukocytosis however she is under immunosuppression. Given acute respiratory failure and hx of recurrent pneumonias, she was started on empiric abx for concerns over new pneumonia. RVP and cultures no growth to date. Last day of empirical treatment for HAP.  - EBV 27K (decreased compared to prior)     Lines/tubes/drains:  - CVC RIJ (for TPN, is tunneled line)   - PIV x2  - PEG/J  - HD AV fistula, left arm        Diet:   Regular diet PO  Holding trickle tube feeds if persistent PO intake >300 kcal/day  Majority of nutrition through TPN per RD    General Cares/Prophylaxis:    DVT Prophylaxis: Heparin SQ  GI Prophylaxis: PPI  Fluids: None  Code Status: Full    Clinically Significant Risk Factors              # Hypoalbuminemia: Lowest albumin = 2.6 g/dL at 2/18/2024  5:13 AM, will monitor as appropriate             # Severe Malnutrition: based on nutrition assessment    # Financial/Environmental Concerns: none              Disposition Plan:    Expected Discharge Date: 3/01/24  Destination: TCU/ home with help/services  Discharge Comments:           The patient's care was discussed with the Attending Physician, Dr. Escobar.     Jannet Isaac MD  Internal Medicine Resident, PGY-3  Medicine Service, [facility] TEAM 1  [facility]  Securely message with [employer] (more info)  Text page via [employer] Paging/Directory   See signed in provider for up to date coverage information  _________________     Interval History  Nursing notes  "reviewed, no acute events overnight. CO2 continues to worsen on VBG, along with pH, with pH again critically low this AM at 7.09. Patient states she is otherwise feeling well, no changes. Breathing feels fine. No fevers or chills. No nausea or abdominal pain. Patient reports that she is tired of HFNC being on and then off again, does not want to restart it if it can be avoided - states she feels no differently whether the HFNC is on or off.     Physical Exam  /51 (BP Location: Right arm)   Pulse 100   Temp 98.2  F (36.8  C) (Oral)   Resp 16   Ht 1.57 m (5' 1.81\")   Wt 44.8 kg (98 lb 12.3 oz)   SpO2 96%   BMI 18.18 kg/m      General: thin, alert and oriented, laying in bed with HFNC, frustrated with repeated use of HFNC  HEENT: Normocephalic, bruising on the right face around right orbit with hematoma and right neck.  Neuro: NAD, following commands, a&o x3, appropriately conversational  Pulm/Resp: normal WOB on RA, able to clear secretions, no cough during exam  CV: RRR, warm extremities, 1+ pitting edema in left hand, no edema in right arm or hand, and 1+ pitting edema from bilateral ankles to feet  Abdomen: Non-distended, PEG in place without erythema or drainage  Incisions/Skin: Bruising to face and right forearm.     Labs and Imaging for this encounter reviewed in chart review.       "

## 2024-02-26 NOTE — PROGRESS NOTES
"/52 (BP Location: Right arm)   Pulse 81   Temp 97.6  F (36.4  C) (Oral)   Resp 16   Ht 1.57 m (5' 1.81\")   Wt 44.8 kg (98 lb 12.3 oz)   SpO2 96%   BMI 18.18 kg/m      Denies pain. Eating well. Intermittent nausea relieved with IV zofran. Denies shortness of breath. SpO2 92% at rest. Desats intermittently, 95% on 2L nc. Continues to decline offer of bipap, despite worsening VBGs. A&Ox4, no mental status changes. Left arm edematous and raised on pillows. Blanchable redness to coccyx, covered with mepilex and reminded to reposition self in bed frequently. Up walking in halls with RN this evening, steady on feet, complains of some weakness but denies lightheadedness or dizziness. Continue to encourage activity and nutrition as tolerated, monitor respiratory status for any changes - continuous pulse ox is on. Dialysis scheduled for tomorrow.   "

## 2024-02-26 NOTE — PROGRESS NOTES
Date: 2/26/2024  Time: 1545     Data:    Weight change:  -3 kg  Ultrafiltration - Post Run Net Total Removed (mL):  3000 ml  Vascular Access Status: Fistula patent  Dialyzer Rinse:  Light  Total Blood Volume Processed: 0 L   Total Dialysis (Treatment) Time:   2 Hrs  Dialysate Bath: K 3, Ca 2.25  Heparin: Heparin: None     Lab:   No  HbsAg - Non- reactive  (02/17/2024)  HbsAb -  susceptible < 8.44(02/17/2024)      Interventions:  Dialysis done through left upper arm AV Fistula using 15 gauge needles  UF set to 3.3 Liters, accommodating priming and rinse back volumes  , DFR sequential UF only  See Flowsheet for Crit Profile A/B throughout the run  Soft bp's, sbp in 90's, asymptomatic, tolerated UF pull  Treatment has ended safely and  blood is rinsed back completely  Decannulation done post HD, hemostasis is achieved in 10 minutes  Pressure dressing is applied, to be removed after 4-6 hours  Report given to PCN, sent back to room in stable condition     Assessment:  A/O x 4, calm and cooperative, denies pain  Lung sounds diminished anterior and lateral BUL, diminished BLL  Left upper  arm AV Fistula has good thrill and bruit                Plan:    Per Renal team        MISAEL VillanuevaN, RN  Acute Dialysis RN  [facility]

## 2024-02-26 NOTE — PLAN OF CARE
Goal Outcome Evaluation:      Plan of Care Reviewed With: patient    Overall Patient Progress: improvingOverall Patient Progress: improving    Outcome Evaluation: see RD note 2/26    Nimco Lion MS, RD, LD, [facility]    6C (beds 6111-5646) + 7C (beds 4677-1488) + ED   RD pager: [phone]  Weekend/Holiday RD pager: [phone]

## 2024-02-27 LAB
ALBUMIN SERPL BCG-MCNC: 3.3 G/DL (ref 3.5–5.2)
ALP SERPL-CCNC: 88 U/L (ref 40–150)
ALT SERPL W P-5'-P-CCNC: 16 U/L (ref 0–50)
ANION GAP SERPL CALCULATED.3IONS-SCNC: 11 MMOL/L (ref 7–15)
AST SERPL W P-5'-P-CCNC: 21 U/L (ref 0–45)
BASE EXCESS BLDV CALC-SCNC: -3.9 MMOL/L (ref -3–3)
BASE EXCESS BLDV CALC-SCNC: 8.5 MMOL/L (ref -3–3)
BASOPHILS # BLD AUTO: ABNORMAL 10*3/UL
BASOPHILS # BLD AUTO: ABNORMAL 10*3/UL
BASOPHILS # BLD MANUAL: 0 10E3/UL (ref 0–0.2)
BASOPHILS # BLD MANUAL: 0 10E3/UL (ref 0–0.2)
BASOPHILS NFR BLD AUTO: ABNORMAL %
BASOPHILS NFR BLD AUTO: ABNORMAL %
BASOPHILS NFR BLD MANUAL: 0 %
BASOPHILS NFR BLD MANUAL: 0 %
BILIRUB SERPL-MCNC: <0.2 MG/DL
BUN SERPL-MCNC: 70.2 MG/DL (ref 8–23)
CALCIUM SERPL-MCNC: 9.3 MG/DL (ref 8.8–10.2)
CHLORIDE SERPL-SCNC: 103 MMOL/L (ref 98–107)
CREAT SERPL-MCNC: 4.42 MG/DL (ref 0.51–0.95)
DEPRECATED HCO3 PLAS-SCNC: 23 MMOL/L (ref 22–29)
EGFRCR SERPLBLD CKD-EPI 2021: 11 ML/MIN/1.73M2
EOSINOPHIL # BLD AUTO: ABNORMAL 10*3/UL
EOSINOPHIL # BLD AUTO: ABNORMAL 10*3/UL
EOSINOPHIL # BLD MANUAL: 0.1 10E3/UL (ref 0–0.7)
EOSINOPHIL # BLD MANUAL: 0.2 10E3/UL (ref 0–0.7)
EOSINOPHIL NFR BLD AUTO: ABNORMAL %
EOSINOPHIL NFR BLD AUTO: ABNORMAL %
EOSINOPHIL NFR BLD MANUAL: 1 %
EOSINOPHIL NFR BLD MANUAL: 3 %
ERYTHROCYTE [DISTWIDTH] IN BLOOD BY AUTOMATED COUNT: 20.6 % (ref 10–15)
ERYTHROCYTE [DISTWIDTH] IN BLOOD BY AUTOMATED COUNT: 20.9 % (ref 10–15)
GLUCOSE SERPL-MCNC: 152 MG/DL (ref 70–99)
HCO3 BLDV-SCNC: 27 MMOL/L (ref 21–28)
HCO3 BLDV-SCNC: 38 MMOL/L (ref 21–28)
HCT VFR BLD AUTO: 27.4 % (ref 35–47)
HCT VFR BLD AUTO: 27.6 % (ref 35–47)
HGB BLD-MCNC: 7.8 G/DL (ref 11.7–15.7)
HGB BLD-MCNC: 7.9 G/DL (ref 11.7–15.7)
IGG SERPL-MCNC: 655 MG/DL (ref 610–1616)
IMM GRANULOCYTES # BLD: ABNORMAL 10*3/UL
IMM GRANULOCYTES # BLD: ABNORMAL 10*3/UL
IMM GRANULOCYTES NFR BLD: ABNORMAL %
IMM GRANULOCYTES NFR BLD: ABNORMAL %
LACTATE SERPL-SCNC: 0.4 MMOL/L (ref 0.7–2)
LYMPHOCYTES # BLD AUTO: ABNORMAL 10*3/UL
LYMPHOCYTES # BLD AUTO: ABNORMAL 10*3/UL
LYMPHOCYTES # BLD MANUAL: 0.5 10E3/UL (ref 0.8–5.3)
LYMPHOCYTES # BLD MANUAL: 0.6 10E3/UL (ref 0.8–5.3)
LYMPHOCYTES NFR BLD AUTO: ABNORMAL %
LYMPHOCYTES NFR BLD AUTO: ABNORMAL %
LYMPHOCYTES NFR BLD MANUAL: 6 %
LYMPHOCYTES NFR BLD MANUAL: 8 %
MAGNESIUM SERPL-MCNC: 1.9 MG/DL (ref 1.7–2.3)
MAGNESIUM SERPL-MCNC: 2.5 MG/DL (ref 1.7–2.3)
MCH RBC QN AUTO: 36.8 PG (ref 26.5–33)
MCH RBC QN AUTO: 36.9 PG (ref 26.5–33)
MCHC RBC AUTO-ENTMCNC: 28.3 G/DL (ref 31.5–36.5)
MCHC RBC AUTO-ENTMCNC: 28.8 G/DL (ref 31.5–36.5)
MCV RBC AUTO: 128 FL (ref 78–100)
MCV RBC AUTO: 130 FL (ref 78–100)
METAMYELOCYTES # BLD MANUAL: 0.1 10E3/UL
METAMYELOCYTES # BLD MANUAL: 0.3 10E3/UL
METAMYELOCYTES NFR BLD MANUAL: 1 %
METAMYELOCYTES NFR BLD MANUAL: 4 %
MONOCYTES # BLD AUTO: ABNORMAL 10*3/UL
MONOCYTES # BLD AUTO: ABNORMAL 10*3/UL
MONOCYTES # BLD MANUAL: 1 10E3/UL (ref 0–1.3)
MONOCYTES # BLD MANUAL: 1.1 10E3/UL (ref 0–1.3)
MONOCYTES NFR BLD AUTO: ABNORMAL %
MONOCYTES NFR BLD AUTO: ABNORMAL %
MONOCYTES NFR BLD MANUAL: 12 %
MONOCYTES NFR BLD MANUAL: 14 %
MYELOCYTES # BLD MANUAL: 0.1 10E3/UL
MYELOCYTES # BLD MANUAL: 0.2 10E3/UL
MYELOCYTES NFR BLD MANUAL: 1 %
MYELOCYTES NFR BLD MANUAL: 2 %
NEUTROPHILS # BLD AUTO: ABNORMAL 10*3/UL
NEUTROPHILS # BLD AUTO: ABNORMAL 10*3/UL
NEUTROPHILS # BLD MANUAL: 5.7 10E3/UL (ref 1.6–8.3)
NEUTROPHILS # BLD MANUAL: 6.1 10E3/UL (ref 1.6–8.3)
NEUTROPHILS NFR BLD AUTO: ABNORMAL %
NEUTROPHILS NFR BLD AUTO: ABNORMAL %
NEUTROPHILS NFR BLD MANUAL: 72 %
NEUTROPHILS NFR BLD MANUAL: 76 %
NRBC # BLD AUTO: 0.1 10E3/UL
NRBC # BLD AUTO: 0.1 10E3/UL
NRBC # BLD AUTO: 0.2 10E3/UL
NRBC BLD AUTO-RTO: 2 /100
NRBC BLD AUTO-RTO: 2 /100
NRBC BLD MANUAL-RTO: 3 %
O2/TOTAL GAS SETTING VFR VENT: 30 %
O2/TOTAL GAS SETTING VFR VENT: 35 %
OXYHGB MFR BLDV: 75 % (ref 70–75)
OXYHGB MFR BLDV: 79 % (ref 70–75)
PCO2 BLDV: 104 MM HG (ref 40–50)
PCO2 BLDV: 96 MM HG (ref 40–50)
PH BLDV: 7.02 [PH] (ref 7.32–7.43)
PH BLDV: 7.21 [PH] (ref 7.32–7.43)
PHOSPHATE SERPL-MCNC: 2.6 MG/DL (ref 2.5–4.5)
PHOSPHATE SERPL-MCNC: 5.2 MG/DL (ref 2.5–4.5)
PLAT MORPH BLD: ABNORMAL
PLAT MORPH BLD: ABNORMAL
PLATELET # BLD AUTO: 280 10E3/UL (ref 150–450)
PLATELET # BLD AUTO: 283 10E3/UL (ref 150–450)
PO2 BLDV: 45 MM HG (ref 25–47)
PO2 BLDV: 56 MM HG (ref 25–47)
POLYCHROMASIA BLD QL SMEAR: SLIGHT
POLYCHROMASIA BLD QL SMEAR: SLIGHT
POTASSIUM SERPL-SCNC: 4.1 MMOL/L (ref 3.4–5.3)
PROSPERA TRANSPLANT MONITORING: 1.04 %
PROT SERPL-MCNC: 5.6 G/DL (ref 6.4–8.3)
RBC # BLD AUTO: 2.12 10E6/UL (ref 3.8–5.2)
RBC # BLD AUTO: 2.14 10E6/UL (ref 3.8–5.2)
RBC MORPH BLD: ABNORMAL
RBC MORPH BLD: ABNORMAL
SAO2 % BLDV: 77.3 % (ref 70–75)
SAO2 % BLDV: 82.2 % (ref 70–75)
SODIUM SERPL-SCNC: 137 MMOL/L (ref 135–145)
VIT D+METAB SERPL-MCNC: 46 NG/ML (ref 20–50)
WBC # BLD AUTO: 7.9 10E3/UL (ref 4–11)
WBC # BLD AUTO: 8 10E3/UL (ref 4–11)

## 2024-02-27 PROCEDURE — 84100 ASSAY OF PHOSPHORUS: CPT

## 2024-02-27 PROCEDURE — 80053 COMPREHEN METABOLIC PANEL: CPT | Performed by: INTERNAL MEDICINE

## 2024-02-27 PROCEDURE — 250N000013 HC RX MED GY IP 250 OP 250 PS 637: Performed by: NURSE PRACTITIONER

## 2024-02-27 PROCEDURE — 90935 HEMODIALYSIS ONE EVALUATION: CPT | Performed by: STUDENT IN AN ORGANIZED HEALTH CARE EDUCATION/TRAINING PROGRAM

## 2024-02-27 PROCEDURE — 36591 DRAW BLOOD OFF VENOUS DEVICE: CPT | Performed by: NURSE PRACTITIONER

## 2024-02-27 PROCEDURE — 30243S1 TRANSFUSION OF NONAUTOLOGOUS GLOBULIN INTO CENTRAL VEIN, PERCUTANEOUS APPROACH: ICD-10-PCS | Performed by: INTERNAL MEDICINE

## 2024-02-27 PROCEDURE — 999N000157 HC STATISTIC RCP TIME EA 10 MIN

## 2024-02-27 PROCEDURE — 250N000009 HC RX 250

## 2024-02-27 PROCEDURE — 99222 1ST HOSP IP/OBS MODERATE 55: CPT | Performed by: PSYCHIATRY & NEUROLOGY

## 2024-02-27 PROCEDURE — 120N000002 HC R&B MED SURG/OB UMMC

## 2024-02-27 PROCEDURE — 250N000011 HC RX IP 250 OP 636: Mod: JZ | Performed by: INTERNAL MEDICINE

## 2024-02-27 PROCEDURE — 83735 ASSAY OF MAGNESIUM: CPT

## 2024-02-27 PROCEDURE — 85027 COMPLETE CBC AUTOMATED: CPT

## 2024-02-27 PROCEDURE — 99207 PR NO BILLABLE SERVICE THIS VISIT: CPT | Performed by: NURSE PRACTITIONER

## 2024-02-27 PROCEDURE — 250N000013 HC RX MED GY IP 250 OP 250 PS 637

## 2024-02-27 PROCEDURE — 86352 CELL FUNCTION ASSAY W/STIM: CPT | Performed by: NURSE PRACTITIONER

## 2024-02-27 PROCEDURE — 94640 AIRWAY INHALATION TREATMENT: CPT

## 2024-02-27 PROCEDURE — 85027 COMPLETE CBC AUTOMATED: CPT | Performed by: INTERNAL MEDICINE

## 2024-02-27 PROCEDURE — 250N000009 HC RX 250: Performed by: INTERNAL MEDICINE

## 2024-02-27 PROCEDURE — 99232 SBSQ HOSP IP/OBS MODERATE 35: CPT | Mod: GC | Performed by: INTERNAL MEDICINE

## 2024-02-27 PROCEDURE — P9045 ALBUMIN (HUMAN), 5%, 250 ML: HCPCS | Mod: JZ | Performed by: INTERNAL MEDICINE

## 2024-02-27 PROCEDURE — 36415 COLL VENOUS BLD VENIPUNCTURE: CPT | Performed by: INTERNAL MEDICINE

## 2024-02-27 PROCEDURE — 258N000003 HC RX IP 258 OP 636: Performed by: STUDENT IN AN ORGANIZED HEALTH CARE EDUCATION/TRAINING PROGRAM

## 2024-02-27 PROCEDURE — 250N000013 HC RX MED GY IP 250 OP 250 PS 637: Performed by: INTERNAL MEDICINE

## 2024-02-27 PROCEDURE — 85007 BL SMEAR W/DIFF WBC COUNT: CPT

## 2024-02-27 PROCEDURE — 36591 DRAW BLOOD OFF VENOUS DEVICE: CPT

## 2024-02-27 PROCEDURE — 250N000012 HC RX MED GY IP 250 OP 636 PS 637: Performed by: INTERNAL MEDICINE

## 2024-02-27 PROCEDURE — 999N000248 HC STATISTIC IV INSERT WITH US BY RN

## 2024-02-27 PROCEDURE — 999N000215 HC STATISTIC HFNC ADULT NON-CPAP

## 2024-02-27 PROCEDURE — 85007 BL SMEAR W/DIFF WBC COUNT: CPT | Performed by: INTERNAL MEDICINE

## 2024-02-27 PROCEDURE — 250N000009 HC RX 250: Performed by: NURSE PRACTITIONER

## 2024-02-27 PROCEDURE — 250N000011 HC RX IP 250 OP 636: Mod: JZ | Performed by: NURSE PRACTITIONER

## 2024-02-27 PROCEDURE — 94660 CPAP INITIATION&MGMT: CPT

## 2024-02-27 PROCEDURE — 634N000001 HC RX 634: Mod: JZ | Performed by: STUDENT IN AN ORGANIZED HEALTH CARE EDUCATION/TRAINING PROGRAM

## 2024-02-27 PROCEDURE — 82306 VITAMIN D 25 HYDROXY: CPT | Performed by: NURSE PRACTITIONER

## 2024-02-27 PROCEDURE — 83605 ASSAY OF LACTIC ACID: CPT | Performed by: INTERNAL MEDICINE

## 2024-02-27 PROCEDURE — 94640 AIRWAY INHALATION TREATMENT: CPT | Mod: 76

## 2024-02-27 PROCEDURE — 82805 BLOOD GASES W/O2 SATURATION: CPT

## 2024-02-27 PROCEDURE — 82805 BLOOD GASES W/O2 SATURATION: CPT | Performed by: INTERNAL MEDICINE

## 2024-02-27 PROCEDURE — 250N000011 HC RX IP 250 OP 636: Performed by: INTERNAL MEDICINE

## 2024-02-27 PROCEDURE — 99233 SBSQ HOSP IP/OBS HIGH 50: CPT | Mod: FS | Performed by: NURSE PRACTITIONER

## 2024-02-27 PROCEDURE — 90937 HEMODIALYSIS REPEATED EVAL: CPT

## 2024-02-27 RX ORDER — DIPHENHYDRAMINE HCL 50 MG
50 CAPSULE ORAL ONCE
Qty: 1 CAPSULE | Refills: 0 | Status: COMPLETED | OUTPATIENT
Start: 2024-02-27 | End: 2024-02-27

## 2024-02-27 RX ORDER — DIPHENHYDRAMINE HYDROCHLORIDE 50 MG/ML
50 INJECTION INTRAMUSCULAR; INTRAVENOUS
Status: DISCONTINUED | OUTPATIENT
Start: 2024-02-27 | End: 2024-03-30

## 2024-02-27 RX ORDER — DIPHENHYDRAMINE HYDROCHLORIDE 50 MG/ML
50 INJECTION INTRAMUSCULAR; INTRAVENOUS ONCE
Qty: 1 ML | Refills: 0 | Status: COMPLETED | OUTPATIENT
Start: 2024-02-27 | End: 2024-02-27

## 2024-02-27 RX ORDER — DIPHENHYDRAMINE HCL 50 MG
50 CAPSULE ORAL
Status: DISCONTINUED | OUTPATIENT
Start: 2024-02-27 | End: 2024-03-30

## 2024-02-27 RX ORDER — CARBOXYMETHYLCELLULOSE SODIUM 5 MG/ML
1 SOLUTION/ DROPS OPHTHALMIC
Status: DISCONTINUED | OUTPATIENT
Start: 2024-02-27 | End: 2024-02-28

## 2024-02-27 RX ORDER — DIPHENHYDRAMINE HYDROCHLORIDE 50 MG/ML
50 INJECTION INTRAMUSCULAR; INTRAVENOUS
Status: DISCONTINUED | OUTPATIENT
Start: 2024-02-27 | End: 2024-03-04

## 2024-02-27 RX ORDER — ACETAMINOPHEN 325 MG/1
650 TABLET ORAL
Status: DISCONTINUED | OUTPATIENT
Start: 2024-02-27 | End: 2024-02-28

## 2024-02-27 RX ORDER — LEVALBUTEROL INHALATION SOLUTION 1.25 MG/3ML
1.25 SOLUTION RESPIRATORY (INHALATION)
Status: DISCONTINUED | OUTPATIENT
Start: 2024-02-27 | End: 2024-03-19

## 2024-02-27 RX ORDER — METHYLPREDNISOLONE SODIUM SUCCINATE 125 MG/2ML
125 INJECTION, POWDER, LYOPHILIZED, FOR SOLUTION INTRAMUSCULAR; INTRAVENOUS
Status: DISCONTINUED | OUTPATIENT
Start: 2024-02-27 | End: 2024-03-04

## 2024-02-27 RX ORDER — ACETAMINOPHEN 325 MG/1
650 TABLET ORAL ONCE
Qty: 2 TABLET | Refills: 0 | Status: COMPLETED | OUTPATIENT
Start: 2024-02-27 | End: 2024-02-27

## 2024-02-27 RX ADMIN — PREDNISONE 5 MG: 5 TABLET ORAL at 13:12

## 2024-02-27 RX ADMIN — DIPHENHYDRAMINE HYDROCHLORIDE 50 MG: 50 CAPSULE ORAL at 16:37

## 2024-02-27 RX ADMIN — PREDNISONE 2.5 MG: 2.5 TABLET ORAL at 20:55

## 2024-02-27 RX ADMIN — ACETAMINOPHEN 975 MG: 325 TABLET, FILM COATED ORAL at 03:31

## 2024-02-27 RX ADMIN — SODIUM CHLORIDE 300 ML: 9 INJECTION, SOLUTION INTRAVENOUS at 08:02

## 2024-02-27 RX ADMIN — SODIUM CHLORIDE 250 ML: 9 INJECTION, SOLUTION INTRAVENOUS at 08:02

## 2024-02-27 RX ADMIN — EPOETIN ALFA-EPBX 8000 UNITS: 10000 INJECTION, SOLUTION INTRAVENOUS; SUBCUTANEOUS at 09:27

## 2024-02-27 RX ADMIN — HEPARIN SODIUM 5000 UNITS: 5000 INJECTION, SOLUTION INTRAVENOUS; SUBCUTANEOUS at 13:12

## 2024-02-27 RX ADMIN — CYANOCOBALAMIN TAB 500 MCG 500 MCG: 500 TAB at 13:13

## 2024-02-27 RX ADMIN — METOPROLOL TARTRATE 25 MG: 100 TABLET, FILM COATED ORAL at 09:36

## 2024-02-27 RX ADMIN — SODIUM BICARBONATE 650 MG: 650 TABLET ORAL at 20:55

## 2024-02-27 RX ADMIN — LEVALBUTEROL HYDROCHLORIDE 1.25 MG: 1.25 SOLUTION RESPIRATORY (INHALATION) at 20:32

## 2024-02-27 RX ADMIN — CALCIUM CARBONATE 600 MG (1,500 MG)-VITAMIN D3 400 UNIT TABLET 1 TABLET: at 16:37

## 2024-02-27 RX ADMIN — SODIUM BICARBONATE 650 MG: 650 TABLET ORAL at 16:40

## 2024-02-27 RX ADMIN — HYDROCORTISONE SODIUM SUCCINATE 100 MG: 100 INJECTION, POWDER, FOR SOLUTION INTRAMUSCULAR; INTRAVENOUS at 16:38

## 2024-02-27 RX ADMIN — METOPROLOL TARTRATE 25 MG: 100 TABLET, FILM COATED ORAL at 20:55

## 2024-02-27 RX ADMIN — HUMAN IMMUNOGLOBULIN G 25 G: 20 LIQUID INTRAVENOUS at 17:24

## 2024-02-27 RX ADMIN — CALCIUM CARBONATE 600 MG (1,500 MG)-VITAMIN D3 400 UNIT TABLET 1 TABLET: at 13:12

## 2024-02-27 RX ADMIN — TACROLIMUS 4 MG: 5 CAPSULE ORAL at 13:12

## 2024-02-27 RX ADMIN — LEVALBUTEROL HYDROCHLORIDE 1.25 MG: 1.25 SOLUTION RESPIRATORY (INHALATION) at 13:13

## 2024-02-27 RX ADMIN — LIDOCAINE AND PRILOCAINE: 25; 25 CREAM TOPICAL at 07:58

## 2024-02-27 RX ADMIN — ALBUMIN HUMAN 250 ML: 0.05 INJECTION, SOLUTION INTRAVENOUS at 08:07

## 2024-02-27 RX ADMIN — HEPARIN SODIUM 5000 UNITS: 5000 INJECTION, SOLUTION INTRAVENOUS; SUBCUTANEOUS at 02:04

## 2024-02-27 RX ADMIN — Medication 40 MG: at 13:12

## 2024-02-27 RX ADMIN — TACROLIMUS 4.5 MG: 5 CAPSULE ORAL at 16:40

## 2024-02-27 RX ADMIN — SODIUM CHLORIDE: 234 INJECTION, SOLUTION INTRAVENOUS at 21:03

## 2024-02-27 RX ADMIN — SODIUM BICARBONATE 650 MG: 650 TABLET ORAL at 13:13

## 2024-02-27 RX ADMIN — ACETAMINOPHEN 650 MG: 325 TABLET, FILM COATED ORAL at 16:37

## 2024-02-27 RX ADMIN — LIDOCAINE 4% 1 PATCH: 40 PATCH TOPICAL at 21:08

## 2024-02-27 RX ADMIN — Medication 40 MG: at 20:55

## 2024-02-27 RX ADMIN — Medication: at 08:03

## 2024-02-27 ASSESSMENT — ACTIVITIES OF DAILY LIVING (ADL)
ADLS_ACUITY_SCORE: 36

## 2024-02-27 NOTE — PROGRESS NOTES
Physician Attestation     I saw and evaluated Sofie Rodriguez as part of a shared APRN/PA visit.     I personally reviewed the vital signs, medications, labs, and imaging.    I personally provided a substantive portion of care for this patient and I approve the care plan as written by the EMILY.  I was involved with Medical Decision Making including: Please see A&P for additional details of medical decision making.  MANAGEMENT DISCUSSED with the following over the past 24 hours:       Sofie Rodriguez is 63 year old lady s/p shady sltx for COPD on 6/28/22. Post tx course complicated by Rt hemidiaphragm palsy, recurrent pna, positive DSA, hypogamma, severe gastroparesis requiring GJ. H/o GI bleed 2/2 pyloric ulcer and ESRD on HD.    She also has had Chronic diarrhoea, recurrent C. diff too. She was admitted in 5/2023 for failure to thrive. She had Rt hip fracture s/p ORIF in 12/2023.     Unfortunately she has contd to failure to thrive with significant deconditioning/severe malnutrition. It is suspected to be due to gastroparesis/small bowel hypermotility. She was admitted for TPN initiation and port placement.   She was seen by GI who recommended CT enterography but did not do it due to large volume of contrast bolus needed.  She was intubated for acute hypercapneic resp failure on 2/17/24 and imaging was concerning for pna +/- volume overload. She was extubated on 2/19/24.     She has unfortunately noted persistent/progressive hypercapnea and refusing BiPAP till date and was put on  HFNC with limited to no benefit. Pt is more sleepy today.  She continues to complain of nausea but no abd pain. No loose stools. No Cough/wheezing or SOB.   She is being dialyzed today.  Metabolic cart study: MREE = 1185 kcals/day.     Chest CT reviewed: Shady postr segments GGO and consolidation.     Plan:  - Recommend BiPAP, pt is finally willing to do it.  - Immuknow assay today pending.  - Follow daily VBG.  - IGG pending.  - Care  conference likely needed. Will need to consider Trach, if pt prefers ongoing cares.  - Aggressive volume removal recommended.    Harshad Gong MD  Date of Service (when I saw the patient): 02/27/24

## 2024-02-27 NOTE — PLAN OF CARE
"Goal Outcome Evaluation:      Plan of Care Reviewed With: patient    Overall Patient Progress: no changeOverall Patient Progress: no change    Outcome Evaluation: Pt 02 satting below 92% on 30% FiO2, RT paged and increased pt Fio2 to 45%, pt satting above 90% with 45% FiO2  Shift Hours: Assumed car from 1900 to 0730    Vitals: Q8hrs, soft bp and elevated HR     Pain/Comfort: Denies but reported pain during turns and some headache. PRN tylenol given and gave some relief.     Activity/mobility: in bed throughout shift, turn and repo Q2hrs.     Neuro status: Disoriented to time, sad and flat affect     Respiratory: HFNC @45% FiO2 , 30LPM.     Cardiac: Tachycardiac, edema to ble, lymph wrap applied.     GI/: No BM, on HD, no void.     Lab/Tests Results (Electrolyte protocol/DM): No BS, Chest CT done 2100, awaiting results.     Lines/Draines: R CVC infusing TPN/Lipids cycled. L AV fistula WDL. Old PIV on R hand removed d/t phlebitis, New PIV placed on RFA.      Skin integrity/wounds: see flowsheets     Nutrition: Reg dies, poor appetite, fluids encouraged.     How patient takes medications: Through G tube.      Continuing POC, Pt pending discharge on 3/1 to TCU.     Critical lab reieved @ 0614 for Ph 7.02, PCO2 venous 104. MD Alison Ronquillo notified @0619 via [employer] page. MD replied \"okay, no changes for now\".   "

## 2024-02-27 NOTE — PLAN OF CARE
"BP 95/61   Pulse 93   Temp 98.5  F (36.9  C) (Oral)   Resp 18   Ht 1.57 m (5' 1.81\")   Wt 47.1 kg (103 lb 13.4 oz)   SpO2 97%   BMI 19.11 kg/m      Care from: [id number]    VS & Pain: Pt has been slightly tachycardic all shift with soft Bps. Pt has not reported any pain today.  Neuro: A+Ox4  Respiratory: Pt has been hypercapnic today and was switched to High flow NC on 30% with 35 LPM.  Cardiac: WDL  Peripheral neurovascular: Slight generalized weakness. Haa slight edema in L arm with fistula.  GI/: No BM this shift. Pt is on dialysis  Nutrition: Regular diet. Poor appetite today, one pancake eaten.  Skin: WDL  Lines: R PIV saline locked, R CVC running TPN  Activity: SBA  Events this shift: Pt had dialysis today per dialysis nurse 3L of fluid removed. Had a repeat VBG today after having a pH of 7.07 and CO2 of 99. Got put on High flow NC. Had chest X-ray and had a chest CT ordered today. Will start calorie counts x3days starting tomorrow. Tried to wean pt to lower FiO2 then starting 40% per pulmonary team to keep sats above 92%: lowered to 25% FiO2 and didn't tolerate it so patient was brought to 30%    Plan:  Will continue plan of care.  Goal Outcome Evaluation:           Overall Patient Progress: decliningOverall Patient Progress: declining    Outcome Evaluation: Pt hypercapnic today and put on high flow nasal cannula; currently on 30% 35LPM goal sat above 92%. Chest X ray done today and dialysis. Will start carb counts x3 days tomorrow.      "

## 2024-02-27 NOTE — PROGRESS NOTES
Nephrology Progress Note  2024         Assessment & Recommendations:   Sofie Rodriguez is a 63 year old year old female with ESKD, COPD s/p b/l lung transplant in 2022 with multiple complications, gastroparesis s/p GJ tube, GIB, chronic diarrhea, recurrent c-diff, FTT with inability to tolerate any tube feeds, R hip fx s/p ORIF 2023, admitted on 2/10 for initiation of TPN/lipids, transferred to ICU on  with worsening mental status and respiratory acidosis in spite of bipap, ultimately intubated on , being treated for PNA, also with volume overload in setting of high volume TPN.     # ESKD - TTS, LUE AVG, 3hr, 45.5kg, [facility], Dr. Andres Fairbanks. She has been losing weight and now even below her recent set EDW.  - will plan on daily HD this week for volume optimization (HD TTS, UF W/F)  - Requires EMLA cream an hour before HD       # Dialysis access: LUE AVG placed 3-4 months ago, per pt. She had arm swelling and a fistulogram a couple months ago and swelling improved but now has redeveloped.  - US with c/f possible steal syndrome  - per vascular surgery, no concern for steal syndrome, ok to go ahead with fistulogram  - given that AVF is working well on dialysis (450 BFR) and at this time IR is only able to perform fistulograms when AVF isn't working well, will defer to OP for management.    # Hypercapnia: VB.02/104/56/27   - intubated  in setting of hypercapnia in spite of bipap, extubated   - ongoing volume off  - pt refuses bipap due to claustrophobia, is agreeable to intubation if needed and is full code  - agree with PO bicarb to achieve pH of 7.20, though any bicarb will ultimately convert to CO2, so the respiratory issue will need to be addressed; also dialyzing on bicarb of 40 today    # Volume/BP: Edema due to third spacing due to hypoalbuminemia. Anuric; on metoprolol soln 25 mg bid  - volume overload on CXR and on exam with 2+ pitting edema   - bed weights are  "quite variable, ?accuracy  - 3L UF yesterday, another 3L today  - please chart volume of TPN in the I/O, currently getting ~ 1L per day based on prescription    # Nutrition: on TPN and regular diet with calorie counts  # FTT  # Acute on chronic diarrhea  - per team, concern is that pt isn't absorbing much PO or tube feeds with diarrhea increasing significantly with increase in tube feeds; thus needing TPN     # Anemia 2/2 ESKD  On Venofer 50 qwk, Mircera last dose 1/9/2024  - hgb 7-8's  - Will continue Venofer 50 mcg q week (Tuesday)  - increase epogen dose from 4000 to 8000 units (starting 2/20)    # BMD : Ca 8-9's, phos 5.2, alb 3.3  - renvela is held, will restart if phos remains elevated           Recommendations were communicated to primary team via note, in person, and RABIA Ward   Division of Renal Disease and Hypertension  [phone]    Interval History :   Seen bedside, worsening hypercapnia, pt refuses bipap due to claustrophobia. Discussed with her that she may end up intubated again if she doesn't wear bipap. She's suprisingly mentally clear at this point. Primary team started PO bicarb given pH 7.07. Pt is on high flow O2, denies n/v, CP, chills. With some conversation, pt is agreeable to UF only run today.     Review of Systems:   4 point ROS neg other than as noted above    Physical Exam:   I/O last 3 completed shifts:  In: 170 [NG/GT:170]  Out: 3000 [Other:3000]   /56   Pulse 91   Temp 98.5  F (36.9  C) (Oral)   Resp 18   Ht 1.57 m (5' 1.81\")   Wt 47.1 kg (103 lb 13.4 oz)   SpO2 93%   BMI 19.11 kg/m       GENERAL APPEARANCE: NAD  PULM: on high flow O2, diminished  CV: regular rhythm, normal rate, no rub     -2+ pitting edema lower extremities, (LUE (fistula arm) > RUE)  GI: soft   INTEGUMENT: no cyanosis, no rash  NEURO: a/o3 but quite slowed  Access Left AVG     Labs:   All labs reviewed by me  Electrolytes/Renal -   Recent Labs   Lab Test 02/27/24  0556 " 02/26/24  1930 02/26/24  0611 02/25/24  1553 02/25/24  0548     --  139  --  135   POTASSIUM 4.1  --  4.2  --  4.0   CHLORIDE 103  --  103  --  101   CO2 23  --  26  --  26   BUN 70.2*  --  53.2*  --  35.0*   CR 4.42*  --  3.55*  --  2.36*   *  --  159*  --  124*   ESTUARDO 9.3  --  9.0  --  8.7*   MAG 2.5* 2.4* 2.2   < > 1.8   PHOS 5.2* 5.3* 4.9*   < > 3.9    < > = values in this interval not displayed.       CBC -   Recent Labs   Lab Test 02/27/24  0756 02/27/24  0556 02/26/24  0611   WBC 8.0 7.9 6.9   HGB 7.9* 7.8* 8.6*    280 288       LFTs -   Recent Labs   Lab Test 02/27/24  0556 02/26/24  0611 02/25/24  0548   ALKPHOS 88 78 66   BILITOTAL <0.2 <0.2 0.2   ALT 16 13 12   AST 21 19 25   PROTTOTAL 5.6* 5.4* 5.3*   ALBUMIN 3.3* 3.3* 3.1*       Iron Panel -   Recent Labs   Lab Test 09/26/22  0555 09/03/22  1039 08/24/22  0810   IRON 54 21* 41   IRONSAT 22 9* 21   CARLOS 769* 343* 334*         Imaging:  All imaging studies reviewed by me.     Current Medications:   calcium carbonate-vitamin D  1 tablet Per J Tube TID w/meals    cyanocobalamin  500 mcg Per Feeding Tube Daily    dapsone  50 mg Per J Tube Q Mon Wed Fri AM    heparin ANTICOAGULANT  5,000 Units Subcutaneous Q12H    levalbuterol  1.25 mg Nebulization 2 times daily    lidocaine  1 patch Transdermal Q24h    lipids 4 oil  250 mL Intravenous Once per day on Monday Wednesday Friday Saturday    metoprolol  25 mg Per J Tube BID    pantoprazole  40 mg Per J Tube BID    predniSONE  5 mg Per J Tube QAM    And    predniSONE  2.5 mg Per J Tube QPM    [Held by provider] sevelamer carbonate (RENVELA)  0.8 g Oral BID    sodium bicarbonate  650 mg Oral 4x Daily    tacrolimus  4 mg Per J Tube QAM    And    tacrolimus  4.5 mg Per J Tube QPM      dextrose      heparin (porcine) 500 Units/hr (02/24/24 0839)    parenteral nutrition - ADULT compounded formula CYCLE 64 mL/hr at 02/26/24 7021    sodium chloride 0.9%      - MEDICATION INSTRUCTIONS -       Pema ANTHONY  RABIA Haider     12-Oct-2018 19:57

## 2024-02-27 NOTE — PROGRESS NOTES
Medicine Progress note      [facility]  Medicine Service, [facility] TEAM 1    Date of Hospital Admission: 2/10/2024  Date of ICU Admission: 2/18/2024  Date of Transfer to Medicine Floor: 2/20/2024  Date of Service (when I saw the patient): 02/27/2024      Assessment & Plan  Sofie Rodriguez is a 63 year old female with PMH COPD s/p bilateral lung transplant 6/28/22 c/b hemidiaphragm palsy and recurrent pneumonias, gastroparesis and small bowel dysmotility complicated by severe malnutrition now s/p PEG/J, ESRD on T/Th/Sat HD, recent R femoral fx s/p ORIF, chronic diarrhea, recurrent c-diff, FTT with inability to tolerate any tube feeds, who was admitted to [facility] on 2/10/24 for concerns over malnutrition and TPN initiation via portacath. On 2/17/24 she was transferred to ICU for worsening mental status and acute hypoxic and hypercarbic respiratory failure inspite of BiPAP requiring intubation. She was suspected to have PNA and started on antibiotics. Extubated on 2/19 without complications, now on RA, tolerated iHD on 2/20, and tolerating PO regular diet. Will hold tube feed and monitor calorie counts to determine nutrition plan for discharge. Monitoring pCO2 and pH due to severe respiratory acidosis while patient not agreeable to trial of BiPAP due to claustrophobia.    Changes today:   - await metabolic cart study  - continue VBG in AM for monitoring, and will check VBG while on BiPAP to track progress  - appreciate transplant pulm recs  - will discuss with neurology if there is any additional workup to be considered from a central perspective  - consulted neurology to evaluate for central cause of poor respiratory drive/no physiologic response to respiratory acidosis  - pulmonary transplant following, recs appreciated  - will plan for daily dialysis for UF and volume management   - continuing PO bicarb 650 mg QID to try and stabilize pH given persistent  worsening on VBG  - pt becoming more confused and disoriented this afternoon with pH 7.07 and pCO2 of 104, agreeable to trial of BiPAP, one hour on/one hour off for management  - if mental status improves with BiPAP, consider GOC discussions later this week and involve palliative care tomorrow (2/28) given chronic retention of CO2 that patient does not want to treat with CPAP/BiPAP when at her mental baseline, and given concern that TPN (which is her main source of nutrition) is causing hypervolemia that may require daily dialysis to manage volume, and may also be contributing to her respiratory acidosis      # Severe malnutrition   # FTT   # Gastroparesis, small bowel dysmotility  # S/P PEG/J with intolerance of enteral nutrition   Patient with gastroparesis (presumed due to vagal injury) and small bowel dysmotility complicated by unintentional 40lb weight loss over the past year and now severe malnutrition. Previously intolerant to oral food intake due to nausea. Was initially admitted for portacath and TPN initiation since 2/13. After extubation has been able to tolerate feeds without n/v from 2/20. Electrolytes trending towards baseline today.  Per GI, next steps for workup for malnutrition would include CT enterography to evaluate for anatomic abnormalities contributing to malnutrition vs possible treatment for SIBO (though patient has had multiple courses of treatment in the past with no long term improvement). Patient couldn't tolerate the oral load for CT enterography on 2/21, so will defer this until she is able to tolerate greater PO load. On 2/23 , again discussed with patient the need of small bowel motility study if not improving outpatient through [facility]. No ongoing concerns for SIBO on 2/23 as patient is passing multiple episodes of stools and gas with no abdominal pain or distention. C-diff test negative on 2/21. Per RD, patient tolerating >300 kcal of intake PO currently, so stopped trickle Tube feeds  and continuing with PO and TPN for nutrition.   - Regular diet + increased TPN +  stopping feeds TF  per RD & transplant pul discussion               - Nephrology: limit fluid < 1.5 L per day for TPN ( chart vol of TPN in the I/O)   - RD concerned pt is not absorbing any oral intake and they will be monitoring Bowel function, I/O and, PO/TF/TPN intake.   -  stopped tube feeds for now with >300 kcal in PO intake, and monitor per discussion with transplant pulm, continue with TPN for majority of nutrition needs  - Continue calorie count  - CT enterography with contrast- Pt not able to tolerate oral contrast load of 1500 ml on 2/21; no ongoing concerns for SIBO, would need small bowel motility study if not improving outpatient through [facility]  - Daily Weights  - monitor CMP in the AM      #Severe respiratory acidosis  #Acute hypoxic and hypercarbic respiratory failure  #S/P Lung transplant 2022  #Recurrent pneumonia   Patient received a bilateral lung transplant 6/28/22 for COPD. Was admitted 2/10 to address malnutrition   and admitted to ICU on 2/17 with hypoxic hypercarbic respiratory failure. Intubated on 2/18 AM  due to worsening oxygen requirements, likely due to pulmonary edema given hx of ESRD requiring HD vs infection given hx of lung transplant, immunosuppressed status, and recurrent pneumonias. Extubated on 2/19 without complications,   Micro Follow-up on BAL, viral panel, CMV, fungus, and Blood Cultures show no abnormalities. Does have slowly rising pCO2 on VBG - HFNC did not seem to improve elevated pCO2. Awaiting metabolic CART study to evaluate rising pCO2. pH worsened to 7.02 on 2/27/24 and pCO2 elevated to 104; patient was more tired in AM, however became more confused and disoriented in PM, and was okay with trial of BiPAP one hour on, one hour off to manage respiratory acidosis. Also discussed with neurology, as patient has not had appropriate physiologic response to worsening respiratory acidosis (I.e.  no respiratory compensation/changes while fully awake and alert for respiratory acidosis) and consulted neurology to evaluate if there is concern or need for further workup for central  of respiratory acidosis given acute worsening and change.   - will start BiPAP immediately and repeat VBGs every hour on/off BiPAP to track for the next several hours, continue to encourage patient to wear BiPAP  - discussed with transplant pulmonary team - if more mentally clear, will have palliative care consult tomorrow and then consider goals of care conference sometime later this week  - neurology consulted to assist with evaluation for possible central etiology of hypercapnic respiratory failure  - continue PO sodium bicarb 650 mg QID to stabilize pH   - PRN hypertonic saline inhaler with albuterol nebs  - Continue good pulm toilet  - Transplant pulmonology following, recs appreciated  - PTA immunosuppressive agent  >Prednisone 5 mg every morning, 2.5 mg every afternoon; tacrolimus 4 mg every morning, 4mg every afternoon  > Monitor tacro levels, goal 7-9  > - overnight oximetry study suggestive of O2 vs CPAP need, as did require up to 2LPM overnight to prevent hypoxia  > Try to minimize O2 to preserve respiratory drive, will give IVIG for DSA+   - avoid HFNC, maintain minimum oxygen to keep SaO2 >85% - pending recs from transplant pulm  - PTA dapsone MWF for PJP prophylaxis  - completed course of zosyn for empiric HAP course (2/17 - 2-23)  - Initial decompensation likely from opioids - limit medications that would depress respiration   - port culture per transplant ID rec- [id number]  - awaiting metabolic CART study  - if mental status improves with BiPAP, consider GOC discussions later this week and involve palliative care tomorrow (2/28) given chronic retention of CO2 that patient does not want to treat with CPAP/BiPAP when at her mental baseline, and given concern that TPN (which is her main source of nutrition) is causing  hypervolemia that may require daily dialysis to manage volume, and may also be contributing to her respiratory acidosis      Antibiotics:    Vancomycin (2/17 - 2/17)  Zosyn (2/17 - 2/23)  Dapsone MWF, PJP ppx   Micafungin (2/18- 2/21)    Immunosuppression  Tacrolimus  Prednisone     #Steal physiology of LUE dialysis access fistula  LUE arterial US obtained 2/21 with concern for LUE edema. US of fistula demonstrated steal physiology. Discussed with nephrology, and vascular surgery consulted on 2/22. Vascular surgery has only minor concerns for steal symptoms and given that the AVF is working well, they are okay with outpatient fistulograms/ venoplasty with wrist brachial index and PPG's, unless new concerns arise.  - plan for outpatient workup as above; nephrology in agreement with outpatient workup.    #Hypoalbuminemia  #Left upper extremity unilateral edema, improving   #Bilateral pedal and ankle edema, improving  Edema due to third spacing due to hypoalbuminemia vs HF. BNP >44547 at admission, Echo on 2/18 shows EF of 55-60% with IVC of <2.1 and collapsing >50% with sniff, normal RA pressure, no significant valvular abnormalities ;  Left upper extremity swelling and  pitting lower extremity edema likely due to hypoalbuminemia improved today. Upper limb duplex USG on 2/18 negative for DVT. Wt went from 43.4 kg on admission to 47.4 kg today. She is urinating but cannot chart as she usually urinates with BM. She reports trending to baseline with urination. She denies dysuria, retention symptoms. USG left arm on 2/21 shows steel physiology and Vascular Surgery consulted. Vascular surgery has only minor concerns for steal symptoms and given that the AVF is working well, they are okay with outpatient fistulograms/ venoplasty with wrist brachial index and PPG's, unless new concerns arise. LUE and LE edema significantly decreased since yesterdays HD. No new tingling/ numbness noticed.  - Continue daily weights  - Strict  I/Os  - Elevation and wrapping of only lower legs; no wrapping of Left upper extremity with dialysis fistula    #ESRD on HD T/Th/Sat  #Acute on chronic Anemia 2/2 ESRD  #Hypervolemic hyponatremia  #Anion gap metabolic acidosis - resolved  Patient is ESRD on T/Th/Sat HD, Hgb stabilizing. HD tolerating well. Continuing monitoring electrolytes daily. Anion gap normal since 2/21. Will continue to monitor daily labs/ABG  for refeeding syndrome and acidosis  - Continue Venofer injections    - CBC and CMP daily  - Continue epogen dose 8000 units as per nephrology  - Strict I/Os  - HD T/TH/Sat per nephrology      #Facial and neck bruising  #Pain  Reports soreness in her neck from lying in bed. No soreness in the buttocks/ back. Patient has multiple bruises over her arms and face which is attributed to previous falls. No new bruising reported today. Patients reports her headaches are improving with tylenol. Using heating pads and lidocaine patch for body pains. Couple of small skin tears noted by the Rn's. Skin care done accordingly.  - Tylenol Q4  - Lidocaine patch prn  - Heating pads prn  - Diligent skin cares      #HTN  #A-fib, resolved  Noted atrial fibrillation on arrival with HR elevated at 150, not sustained for > 10 minutes and otherwise hemodynamically stable. Rates stable in the 80's. BP normalizing since 2/22.  - PTA metoprolol 25mg BID   - Continuous telemetry      # Encephalopathy secondary to hypercarbia - resolved  Patient was progressively more lethargic on 2/17 during admission in [facility]. Etiology likely secondary to hypercarbia in context of respiratory failure as patient was noted to have high CO2s concomitant with lethargy. Though receiving oxycodone and fentanyl, lethargy was only partially alleviated by narcan. LFTs and ammonia wnl with low suspicion of hepatic encephalopathy. BUN wnl with low suspicion for uremic encephalopathy. Head CT on 2/18 was negative with low suspicion of acute  intracranial pathology. Patient is awake, alert, oriented and following commands since 2/20.       # Right hip fracture s/p ORIF (December 2023)   - PT/OT      # GERD  - PTA PPI      # Low T4  Patient with new low T4 at 0.64 and TSH at 6.5, concerning for new hypothyroidism vs sick thyroid syndrome. TSH with appropriate response, more consistent with elevation in the setting of acute illness, levothyroxine is not indicated at this time, will monitor for symptom development. Consider repeat testing in outpatient setting once patient no longer acutely ill.       # Hx EBV viremia   # Hypogammaglobulinemia  # Chronic immunosuppression  Patient has been afebrile and has not had leukocytosis however she is under immunosuppression. Given acute respiratory failure and hx of recurrent pneumonias, she was started on empiric abx for concerns over new pneumonia. RVP and cultures no growth to date. Last day of empirical treatment for HAP.  - EBV 27K (decreased compared to prior)       Lines/tubes/drains:  - CVC RIJ (for TPN, is tunneled line)   - PIV x2  - PEG/J  - HD AV fistula, left arm        Diet:   Regular diet PO  Holding trickle tube feeds if persistent PO intake >300 kcal/day  Majority of nutrition through TPN per RD    General Cares/Prophylaxis:    DVT Prophylaxis: Heparin SQ  GI Prophylaxis: PPI  Fluids: None  Code Status: Full    Clinically Significant Risk Factors              # Hypoalbuminemia: Lowest albumin = 2.6 g/dL at 2/18/2024  5:13 AM, will monitor as appropriate             # Severe Malnutrition: based on nutrition assessment    # Financial/Environmental Concerns: none              Disposition Plan:    Expected Discharge Date: 3/01/24  Destination: TCU/ home with help/services  Discharge Comments:           The patient's care was discussed with the Attending Physician, Dr. Escobar.     Jannet Isaac MD  Internal Medicine Resident, PGY-3  Medicine Service, [facility] TEAM 1  [facility]  "[facility]  Securely message with [employer] (more info)  Text page via [employer] Paging/Directory   See signed in provider for up to date coverage information  _________________     Interval History  Nursing notes reviewed. Patient did not tolerate weaning down off of HFNC overnight, HFNC continued at minimal FiO2. VBG again was worse this AM, with pH of 7.02 and pCO2 of 104. Was more tired this AM than yesterday, but still alert. However, this afternoon patient becoming more confused. Asking \"what about the VIPs?\" When discussing her labs and concern for respiratory acidosis, and then asking \"are the VIPs the ones who draw the labs?\" After VBGs were explained to her. Denies fevers, chills, nausea. Reports some abdominal discomfort, but is unable to elaborate. Denies changes in breathing symptoms.       Physical Exam  BP 92/40 (BP Location: Left arm)   Pulse 100   Temp 98.5  F (36.9  C) (Oral)   Resp 16   Ht 1.57 m (5' 1.81\")   Wt 47.1 kg (103 lb 13.4 oz)   SpO2 94%   BMI 19.11 kg/m      General: thin, alert and oriented, laying in bed with HFNC, frustrated with repeated use of HFNC  HEENT: Normocephalic, bruising on the right face around right orbit with hematoma and right neck.  Neuro: follows commands and answers most questions appropriately, however in afternoon patient disoriented (says it is June and Summer, not yet winter, says year is 2024, repeats answers to previous questions multiple times before answering the asked question correctly after redirection)  Pulm/Resp: normal WOB on HFNC and RA (pulls out HFNC during conversation), faint crackles bilaterally  CV: RRR, warm extremities, 1+ pitting edema in left hand, no edema in right arm or hand, and 1+ pitting edema from bilateral ankles to feet  Abdomen: Non-distended, PEG in place without erythema or drainage  Incisions/Skin: Bruising to face and right forearm.     Labs and Imaging for this encounter reviewed in chart review.       "

## 2024-02-27 NOTE — PROGRESS NOTES
Pulmonary Medicine  Cystic Fibrosis - Lung Transplant Team  Progress Note  2024     Patient: Sofie Rodriguez  MRN: [id number]  : 1960 (age 63 year old)  Transplant: 2022 (Lung), POD#[id number]  Admission date: 2/10/2024    Assessment & Plan:     Sofie Rodriguez is a 63 year old female with h/o bilateral lung transplant for COPD on 22 with course complicated by post-operative hemidiaphragm palsy, recurrent PNAs, positive DSA, EBV viremia, hypogammaglobulinemia, severe gastroparesis s/p G/J tube placement 22 with pyloric botox 23, GI bleed  pyloric ulcer, hemobilia s/p ERCP and MRCP, chronic diarrhea, recurrent C diff colitis, and ESRD on HD. Admitted May 2023 for FTT.  Admitted to OSH - in December for right hip fracture s/p ORIF, now with significant deconditioning and ongoing severe malnutrition with gastroparesis, small bowel hypermotility and failure to tolerate any tube feeds. After extensive discussion with the dietician, GI and ultimately convincing the patient, the patient was admitted on 2/10 for initiation of TPN/lipids (s/p tunneled RIJ line placement) with persistent pain at site. Initially on trickle feeds (per GI) for maintaining bowel (stopped with increased PO intake) with recommendation for CT enterography to look for structural abnormalities (unable due to large bolus enteral contrast required for the study). She was transferred to the ICU on  with worsening mental status and respiratory acidosis despite BiPAP, ultimately requiring intubation and mechanical ventilation. CT chest concerning for new infection vs volume overload and started on empiric antimicrobial therapy,  extubated  and infectious work up is negative to date so antimicrobials stopped. Tolerating TPN and small amt of PO intake. Persistent/progressive hypercapnia.     Recommendations:  - Encourage BiPAP use as long as able, otherwise HFNC for CO2 washout, with goal SpO2 >88% and use the  minimum O2 required to maintain saturation to avoid blunting any hypoxic ventilatory drive   - Bronch cultures (2/18) with candida glabrata (likely colonization) but otherwise NGTD, follow  - Sputum cultures ordered if able to collect  - Daily VBG ordered through 3/3 to follow hypercapnia, and later this afternoon 2/27 following period of BiPAP  - Metabolic cart when able (needs to be on RA) to assess etiology of increasing CO2  - ABX deferred for now, revisit pending clinical course and infectious work up  - Histoplasma Ag (blood) 2/26 pending (cryptococcal Ag negative)  - Neurology consulted per primary team to evaluate for decreased central respiratory drive  - Volume removal per nephrology, daily dialysis for now as below for volume removal  - Agree with palliative care consult and considering GOC conference (possible trach) in upcoming days when patient more alert to participate  - Tacrolimus repeat level ordered 2/28  - Repeat immuknow assay 2/27 pending  - Repeat Prospera in one month(due 3/18)  - IgG (2/26) sufficient at 655, although will order IVIG for positive DSA (per Dr. Gong)   - Repeat EBV in one month (~3/18)  - TF on hold for now while monitoring calorie counts (ordered 2/27-2/29)     S/p bilateral lung transplant:   Right hemidiaphragm palsy:   Suspected CARLEE:  New consolidated nodular opacities and GGO concerning for infection:    Acute on chronic hypoxic/hypercapneic respiratory failure:  Hypoventilation: Severely deconditioned. CMV 2/19 negative. CT 2/7 with decreased MARLON opacities, new tree in bud RLL opacities without new symptoms. PFTs unchanged in clinic (but ATS not met), remain significantly below her baseline (1.4-1.5L). Noted increased dyspnea since the Port-A-Cath placement.  Review of notes indicate patient should be on 2 L o2 at night from prior overnight O2 study in Jan 2023. Also reported a slight increase in cough. She was requiring O2 for comfort only but decompensated on 2/17  with worsening encephalopathy, respiratory acidosis (pCO2 up to 106). CT chest 2/17 showed very patchy and new consolidative, nodular opacities, GGO primarily in the bases and intralobular septal thickening concerning for pulmonary edema and volume overload along with new, possibly fungal vs. Atypical infection vs. PTLD (less likely but in the differential) vs. Septic emboli.  Based on negative infectious work up at this time, would favor volume overload in the setting of initiation of TPN. Bronch with lavage of RML 2/18 showed very friable tissue. S/p empiric Zosyn (2/17-2/24) and micafungin 2/18-2/21. Continued clinical improvement with increased nutrition and dialysis although with persistent/progressive CO2 retention, etiology unclear, no improvement with HFNC. Suspect chronic, untreated sleep apnea (overnight oximetry 2/24 with 10:14 (min:sec) < 88%  requiring 2LNC). Also malnutrition likely contributing to diaphragmatic weakness and hypoventilation. May be a component of overfeeding (awaiting metabolic cart study as below).  Also appears to be an issue with drive since the patient feels well with VBG (7.14/89) on 2/25, although now with progressive encephalopathy starting 2/26 (7.07/99) and worsened 3/28 with 7.02/104.   Continues to adamantly refuse BiPAP despite encouragement -> tolerating HFNC 2/26 although without improvement in hypercapnia, agreeable 2/27 for intermittent periods of BiPAP.  - encourage BiPAP use as long as able, otherwise HFNC for CO2 washout, with goal SpO2 >88% and use the minimum O2 required to maintain saturation to avoid blunting any hypoxic ventilatory drive  - Bronch cultures (2/18) with candida glabrata (likely colonization) but otherwise NGTD, follow  - Sputum cultures ordered if able to collect  - Repeat CT chest (2/26, reviewed today with Dr. Gong) dependent predominant GG and consolidative opacities with diffuse bilateral interlobular septal thickening, favored to represent  pulmonary edema, persistent but decreased superimposed patchy/nodular opacities (improving infectious/inflammatory process).   - Daily VBG ordered through 3/3 to follow hypercapnia, and later this afternoon 2/27 following period of BiPAP  - Metabolic cart when able (needs to be on RA) to assess etiology of increasing CO2  - Procalcitonin 2/26 increased to 0.94 from 0.5 on 2/17, although CT without high suspicion of infection so will defer ABX for now, revisit pending clinical course and infectious work up  - Histoplasma Ag (blood) 2/26 pending (cryptococcal Ag negative)  - Neurology consulted per primary team to evaluate for decreased central respiratory drive  - Volume removal per nephrology, daily dialysis for now as below for volume removal  - Nebs: Xopenex BID (2/27)  - Recommend sleep clinic eval as soon as possible after discharge.  - Agree with palliative care consult and considering GOC conference (possible trach) in upcoming days when patient more alert to participate     Immunosuppression:  ImmuKnow 108 and cfDNA 0.12 on 1/10. AZA stopped 5/2023 d/t low immuknow assay and EBV viremia.  - Tacrolimus 4 mg qAM/ 4.5 mg qPM.  Goal level 6-8 (decreased 2/26 per Dr. Gong d/t immuknow assay and concern for infection).  Repeat level ordered 2/28  - Continue Prednisone 5 mg/2.5 mg  - Repeat immuknow assay 2/27 pending     Prophylaxis:   - Dapsone 50 mg q MWF for PJP ppx     Positive DSA: no prior treatment for AMR, has been watched for quite some time given no pulmonary symptoms, prior PFTs stability and significant and ongoing failure to thrive. Last DSA improved to 5723, however will ongoing lower PFTs, may need to consider AMR treatment, however, unclear how she would tolerate. DSA 2/7 with persistent DQ B2, MFI increased to 6966. Prospera (2/18) 1.04 increased from prior 0.12 on 1/10, no change to immunosuppression at this time per Dr. Gong.  - Repeat DSA in one month (due 3/6)  - Repeat Prospera in one  month(due 3/18)  - IgG (2/26) sufficient at 655, although will order IVIG for positive DSA (per Dr. Gong)     ESRD: Dialysis dependent.  Suspect her respiratory symptoms were volume overload secondary to initiation of TPN.  CT chest suggesting volume overload. Has undergone multiple days in a row of dialysis to try to pull fluid  -Dialysis as per nephrology, daily dialysis since 2/27 for volume removal, and PO sodium bicarb started 2/26 for acidosis     EBV viremia: Most recent CT c/a/p (2/7) without adenopathy. Most recent level 2/18 improved to 27k (log 4.4) on 2/18 from 96K (2/7)  - Repeat EBV in one month (~3/18)      Severe protein calorie malnutrition:  FTT:   Gastroparesis s/p PEG/J s/p botox and G-POEM:   SB Hypomotility  Pyloric ulcer:  Chronic nausea and osmotic diarrhea:  SIBO s/p rifaximin:   Recurrent C diff colitis: chronic diarrhea since transplant with recurrent episodes of C diff. GI previously consulted, felt stools consistent with osmotic diarrhea. Over the last year has lost 40 lbs, unable to tolerate any combination of TF, most recently elemental formula. Normal fecal elastase last May. Following with Dr. Navarro who feels her main two issues are vagal injury induced gastroparesis and probably small bowel hypomotility. Her intolerance to J-tube feeds suggests the latter to be a significant issue (notes in a message that there are no motility experts at the  and he is limited in what can be offered). Now s/p port placement with TPN and lipids. GI recommending CT to assess for structural issues. Now taking modest PO.  - Close monitoring electrolytes as patient is high risk for refeeding syndrome  - Management per primary and GI  - Concern that patient will not tolerate CT enterography according to chart notes as she will require 1500 mL enteral contrast bolus which she is unlikely to tolerate given her gastroparesis and reduced bowel function.  - TF on hold for now while monitoring calorie counts  "(ordered 2/27-2/29)     We appreciate the excellent care provided by the [place] [id number] team.  Recommendations communicated via phone and this note.  Will continue to follow along closely, please do not hesitate to call with any questions or concerns.     Patient discussed with Dr. Farideh Grayson, APRN, CNP   Inpatient Nurse Practitioner  Pulmonary CF/Transplant     Subjective & Interval History:     More lethargic today, awakens easily but slower to answer questions, oriented x3, off to date. Denies SOB, wheezing, or chest congestion. No cough. Denies pain. Remains on HFNC 25-30%/30 LPM. Appetite remains poor. Denies GI symptoms, loose stools stable.     Review of Systems:     C: No fever, no chills, no change in weight, no change in appetite  INTEGUMENTARY/SKIN: No rash or obvious new lesions  ENT/MOUTH: No sore throat, no sinus pain, no nasal congestion or drainage  RESP: See interval history  CV: No chest pain, no palpitations, no peripheral edema, no orthopnea  GI: No nausea, no vomiting, no change in stools, no reflux symptoms  : No dysuria  MUSCULOSKELETAL: No myalgias, no arthralgias  ENDOCRINE: Blood sugars with adequate control  NEURO: No headache, no numbness or tingling  PSYCHIATRIC: Mood stable    Physical Exam:     All notes, images, and labs from past 24 hours (at minimum) were reviewed.    Vital signs:  Temp: 98.5  F (36.9  C) Temp src: Oral BP: 97/53 Pulse: 86   Resp: 27 SpO2: 91 % O2 Device: High Flow Nasal Cannula (HFNC) Oxygen Delivery: 30 LPM Height: 157 cm (5' 1.81\") Weight: 47.1 kg (103 lb 13.4 oz)  I/O:   Intake/Output Summary (Last 24 hours) at 2/27/2024 1054  Last data filed at 2/27/2024 0200  Gross per 24 hour   Intake 90 ml   Output 3000 ml   Net -2910 ml     Constitutional: lying in bed in dialysis, in no apparent distress.   HEENT: Eyes with pink conjunctivae, anicteric.  Oral mucosa moist without lesions.   PULM: Diminished air flow bilaterally.  Scattered fine " "crackles, no rhonchi, no wheezes.  Non-labored breathing on HFNC.  CV: Normal S1 and S2.  RRR.  No murmur, gallop, or rub. +LUE peripheral edema.   ABD: NABS, soft, nontender, nondistended.  PEG/J  MSK: Moves all extremities.  + apparent muscle wasting.   NEURO: Alert and conversant.   SKIN: Warm, dry.  No rash on limited exam.   PSYCH: Mood stable.     Data:     LABS    CMP:   Recent Labs   Lab 02/27/24  0556 02/26/24  1930 02/26/24  0611 02/25/24  1553 02/25/24  0548 02/24/24  0558     --  139  --  135 141   POTASSIUM 4.1  --  4.2  --  4.0 3.8   CHLORIDE 103  --  103  --  101 105   CO2 23  --  26  --  26 24   ANIONGAP 11  --  10  --  8 12   *  --  159*  --  124* 114*   BUN 70.2*  --  53.2*  --  35.0* 46.6*   CR 4.42*  --  3.55*  --  2.36* 2.94*   GFRESTIMATED 11*  --  14*  --  23* 17*   ESTUARDO 9.3  --  9.0  --  8.7* 8.8   MAG 2.5* 2.4* 2.2 2.0 1.8 2.0   PHOS 5.2* 5.3* 4.9* 3.8 3.9 4.7*   PROTTOTAL 5.6*  --  5.4*  --  5.3* 5.2*   ALBUMIN 3.3*  --  3.3*  --  3.1* 3.1*   BILITOTAL <0.2  --  <0.2  --  0.2 0.2   ALKPHOS 88  --  78  --  66 66   AST 21  --  19  --  25 25   ALT 16  --  13  --  12 11     CBC:   Recent Labs   Lab 02/27/24  0756 02/27/24  0556 02/26/24  0611 02/25/24  0548   WBC 8.0 7.9 6.9 6.8   RBC 2.14* 2.12* 2.27* 2.34*   HGB 7.9* 7.8* 8.6* 8.8*   HCT 27.4* 27.6* 29.4* 29.9*   * 130* 130* 128*   MCH 36.9* 36.8* 37.9* 37.6*   MCHC 28.8* 28.3* 29.3* 29.4*   RDW 20.6* 20.9* 21.0* 21.0*    280 288 263       INR:   Recent Labs   Lab 02/26/24  0611   INR 0.97       Glucose:   Recent Labs   Lab 02/27/24  0556 02/26/24  0611 02/25/24  0548 02/24/24  0558 02/23/24  0423 02/22/24  1542   * 159* 124* 114* 125* 145*       Blood Gas:   Recent Labs   Lab 02/27/24  0556 02/26/24  0611 02/25/24  0547   PHV 7.02* 7.07* 7.14*   PCO2V 104* 99* 89*   PO2V 56* 56* 51*   HCO3V 27 29* 30*   LINDY -3.9* -3.7* -0.3   O2PER 30 20 3       Culture Data No results for input(s): \"CULT\" in the last 168 " hours.    Virology Data:   Lab Results   Component Value Date    FLUAH1 Not Detected 02/18/2024    FLUAH3 Not Detected 02/18/2024    NN2000 Not Detected 02/18/2024    IFLUB Not Detected 02/18/2024    RSVA Not Detected 02/18/2024    RSVB Not Detected 02/18/2024    PIV1 Not Detected 02/18/2024    PIV2 Not Detected 02/18/2024    PIV3 Not Detected 02/18/2024    HMPV Not Detected 02/18/2024       Historical CMV results (last 3 of prior testing):  Lab Results   Component Value Date    CMVQNT Not Detected 02/19/2024    CMVQNT Not Detected 02/07/2024    CMVQNT Not Detected 01/10/2024     Lab Results   Component Value Date    CMVLOG 3.2 07/12/2023    CMVLOG <2.1 04/19/2023    CMVLOG 3.5 01/25/2023       Urine Studies    Recent Labs   Lab Test 02/18/24  0222 05/18/23  0627   URINEPH 7.5* 5.0   NITRITE Negative Negative   LEUKEST Trace* Moderate*   WBCU 66* 21*       Most Recent [place] Pulmonary Function Testing (FVC/FEV1 only)  FVC-Pre   Date Value Ref Range Status   02/07/2024 1.19 L    01/10/2024 1.12 L    08/29/2023 1.48 L    07/25/2023 1.55 L      FVC-%Pred-Pre   Date Value Ref Range Status   02/07/2024 42 %    01/10/2024 39 %    08/29/2023 53 %    07/25/2023 55 %      FEV1-Pre   Date Value Ref Range Status   02/07/2024 1.13 L    01/10/2024 1.10 L    08/29/2023 1.43 L    07/25/2023 1.54 L      FEV1-%Pred-Pre   Date Value Ref Range Status   02/07/2024 51 %    01/10/2024 49 %    08/29/2023 64 %    07/25/2023 69 %        IMAGING    Recent Results (from the past 48 hour(s))   XR Chest Port 1 View    Narrative    EXAM: XR CHEST PORT 1 VIEW 2/26/2024 10:07 AM    INDICATION: s/p lung transplant, hypercapnia, hypoxia    COMPARISON: Chest radiograph 2/18/2024, chest CT 2/17/2024.    TECHNIQUE: Single portable AP view of the chest.    FINDINGS:   Bilateral lung transplant recipient with intact clam shell sternotomy  wires. Right IJ tunneled central venous catheter terminates at the  high cavoatrial junction. Cardiac silhouette  appears enlarged.  Interval extubation. Hazy attenuation throughout the lungs, left  greater than right. Small left pleural effusion. No pneumothorax.      Impression    IMPRESSION:   In this bilateral lung transplant recipient:  1. Hazy attenuation throughout the lungs likely representing pulmonary  edema.  2. Enlarged cardiac silhouette suspicious for possible pericardial  effusion.  3. Small left pleural effusion.  4. Interval extubation.    I have personally reviewed the examination and initial interpretation  and I agree with the findings.    ALVINA HARRY MD         SYSTEM ID:  [id number]   CT Chest w/o Contrast    Narrative    EXAM: CT CHEST W/O CONTRAST 2/26/2024 9:36 PM    HISTORY: 63 years Female worsening respiratory acidosis, eval for  pneumonia vs pulmonary edema    COMPARISON: Chest radiograph from earlier the same day, CT chest  2/17/2024.    TECHNIQUE: Helical CT imaging of the chest was obtained without  contrast. Multiplanar post-processed and MIP reformats were obtained  reviewed. .    FINDINGS:      LINES/TUBES: Tunneled right internal jugular central venous catheter  tip terminates in the right atrium..      MEDIASTINUM: Postsurgical changes of bilateral lung transplantation.  No suspicious lymphadenopathy. Normal size/configuration of the great  vessels. Atherosclerotic calcifications of the aortic arch. Enlarged  heart. No pericardial effusion. Coronary artery calcifications.    LUNG: Patent tracheobronchial tree without intraluminal mass.  Thickening of the pleural fissures. Small loculated left trace right  pleural effusions. Bilateral interlobular septal thickening, greatest  within the lung bases. Dependent predominant groundglass opacities  with areas of dependent consolidation. Decreased scattered patchy and  nodular opacities compared to 2/17/2024. Pulmonary vascular  congestion..    PLEURA: No pneumothorax or significant pleural effusion..      LOWER NECK: Thyroid  unremarkable.    UPPER ABDOMEN: Limited evaluation due to lack of contrast. Esophagus  appears unremarkable.    BONES: No acute osseous abnormality. No suspicious bony lesions.  Clamshell sternotomy.    SOFT TISSUES: Anasarca.        Impression    IMPRESSION:   1.  Dependent predominant groundglass and consolidative opacities with  diffuse bilateral interlobular septal thickening, favored to represent  pulmonary edema. There are persistent but decreased superimposed  patchy/nodular opacities, favored to represent an improving  infectious/inflammatory process.  2.  Small left and trace right and loculated pleural effusions.  3.  Cardiomegaly.    I have personally reviewed the examination and initial interpretation  and I agree with the findings.    CECI REGAN,          SYSTEM ID:  [id number]

## 2024-02-27 NOTE — CONSULTS
[facility]  Neurology Consultation    Patient Name:  Sofie Rodriguez  MRN:  [id number]    :  1960  Date of Service:  2024  Primary care provider:  Vinny Berrios      Neurology consultation service was asked to see Sofie Rodriguez by Dr. Delacruz, Susan Pizano MD  to evaluate neurological cause for respiratory acidosis.    Chief Complaint: Respiratory acidosis evaluating for neurological cause    History of Present Illness:   Sofie Rodriguez is a 63 year old female with history of COPD status post bilateral lung transplant 2022 complicated by hemidiaphragm palsy and recurrent pneumonias, gastroparesis and small bowel dysmotility complicated by severe malnutrition now status post PEG/J tube, ESRD 3 times a week hemodialysis regimen, recent right femoral fracture status post ORIF, chronic diarrhea, recurrent C. difficile infections, on TPN who was transferred to [facility] from [facility] on 2/10/2024 for concerns over malnutrition and acute hypoxic and hypercarbic respiratory failure initially on BiPAP which progressed to requiring intubation.  Patient was extubated on 2024 without any complication.  Over the last few days it was noticed that the patient has been having respiratory acidosis due to cough and acid levels going up to 104.  On evaluation by primary team, there was concerns for neurological cause for hypercarbia and the patient retaining CO2 hence general neurology team was consulted.  On talking to the patient she endorsed that she feels generally weak but denied any breathing difficulties.  Even on inspection the patient did not have any accessory muscle use.  Paradoxical breathing was also not noticed during inspection.  Patient does complain of neck pain which she has been having since her fall.  Patient is alert and oriented and was able to give a good history.  Patient endorsed losing 40 pounds over the last year due to  inability to eat.  Patient does not like using BiPAP as she has claustrophobia.  Patient denies any numbness, tingling, difficulty swallowing, drooling, consciously needing to breathe, confusion, slurred speech, word finding difficulty, incontinence to bowel/bladder.        ROS  A comprehensive ROS was performed and pertinent findings were included in HPI.     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    Hepatitis 2017    Hep C, [facility]    History of blood transfusion     HTN (hypertension)     Infectious mononucleosis     Lung infection 11/30/2022    Osteopenia      Past Surgical History:   Procedure Laterality Date    BRONCHOSCOPY (RIGID OR FLEXIBLE), DIAGNOSTIC N/A 08/02/2022    Procedure: BRONCHOSCOPY, DIAGNOSTIC- inspection Bronch;  Surgeon: Kamala Lovell MD;  Location: [facility]    BRONCHOSCOPY (RIGID OR FLEXIBLE), DIAGNOSTIC N/A 09/13/2022    Procedure: INSPECTION BRONCHOSCOPY, WITH BRONCHOALVEOLAR LAVAGE;  Surgeon: Jose R Mccullough MD;  Location: [facility]    BRONCHOSCOPY (RIGID OR FLEXIBLE), DIAGNOSTIC N/A 11/09/2022    Procedure: BRONCHOSCOPY, WITH BRONCHOALVEOLAR LAVAGE AND BIOPSY;  Surgeon: Cesar Lima MD;  Location: [facility]    BRONCHOSCOPY (RIGID OR FLEXIBLE), DIAGNOSTIC N/A 01/25/2023    Procedure: BRONCHOSCOPY, WITH BRONCHOALVEOLAR LAVAGE AND BIOPSY;  Surgeon: Mason Reddy MD;  Location: [facility]    BRONCHOSCOPY (RIGID OR FLEXIBLE), DIAGNOSTIC N/A 04/19/2023    Procedure: BRONCHOSCOPY, WITH BRONCHOALVEOLAR LAVAGE AND BIOPSY;  Surgeon: Kamala Lovell MD;  [facility]: [facility]    BRONCHOSCOPY (RIGID OR FLEXIBLE), DIAGNOSTIC N/A 07/12/2023    Procedure: BRONCHOSCOPY, WITH BRONCHOALVEOLAR LAVAGE AND BIOPSY;  Surgeon: Cesar Lima MD;  Location: [facility]    BRONCHOSCOPY  FLEXIBLE AND RIGID N/A 07/19/2022    Procedure: BRONCHOSCOPY inspection only;  Surgeon: Bob Lioa MD;  Location:  GI    COLONOSCOPY  2015    CORONARY ANGIOGRAPHY ADULT ORDER      CV CORONARY ANGIOGRAM N/A 06/30/2021    Procedure: CV CORONARY ANGIOGRAM;  Surgeon: Alexander Cuellar MD;  Location:  HEART CARDIAC CATH LAB    CV RIGHT HEART CATH MEASUREMENTS RECORDED N/A 06/30/2021    Procedure: CV RIGHT HEART CATH;  Surgeon: Alexander Cuellar MD;  Location:  HEART CARDIAC CATH LAB    ENDOSCOPIC PERORAL MYOTOMY N/A 10/09/2023    Procedure: MYOTOMY, ESOPHAGUS, ENDOSCOPIC, ORAL APPROACH;  Surgeon: Gonzalez Navarro MD;  [facility]    ENDOSCOPIC RETROGRADE CHOLANGIOPANCREATOGRAM N/A 08/11/2022    Procedure: ENDOSCOPIC RETROGRADE CHOLANGIOPANCREATOGRAPHY WITH PANCREATIC DUCT NEEDLE KNIFE AND STENT PLACEMENT, BILE DUCT SPHINCTEROTOMY, BLOOD/DEBRIS REMOVAL AND STENT PLACEMENT;  Surgeon: Cosmo Arroyo MD;  [facility]: [facility]    ENDOSCOPIC RETROGRADE CHOLANGIOPANCREATOGRAM N/A 10/07/2022    Procedure: ENDOSCOPIC RETROGRADE CHOLANGIOPANCREATOGRAPHY with biliary and pancreatic stent removal, debris removal;  Surgeon: Cosmo Arroyo MD;  Location:  OR    ENT SURGERY  1974    tonsillectomy    ENTEROSCOPY SMALL BOWEL N/A 08/11/2022    Procedure: SMALL BOWEL ENTEROSCOPY;  Surgeon: Cosmo Arroyo MD;  Location:  OR    ESOPHAGOGASTRODUODENOSCOPY, WITH NASOGASTRIC TUBE INSERTION N/A 07/01/2022    Procedure: ESOPHAGOGASTRODUODENOSCOPY, WITH NASOJEJUNAL TUBE INSERTION;  Surgeon: Ozzy Nickerson MD;  Location:  GI    ESOPHAGOSCOPY, GASTROSCOPY, DUODENOSCOPY (EGD), COMBINED N/A 08/03/2022    Procedure: ESOPHAGOGASTRODUODENOSCOPY (EGD);  Surgeon: Ira Andres MD;  Location:  GI    ESOPHAGOSCOPY, GASTROSCOPY, DUODENOSCOPY (EGD), COMBINED N/A 01/25/2023    Procedure: ESOPHAGOGASTRODUODENOSCOPY (EGD) with botox injection;  Surgeon: Gonzalez Navarro MD;  Location:  GI     ESOPHAGOSCOPY, GASTROSCOPY, DUODENOSCOPY (EGD), COMBINED N/A 10/09/2023    Procedure: ESOPHAGOGASTRODUODENOSCOPY;  Surgeon: Gonzalez Navarro MD;  [facility]: [facility]    HAND SURGERY      INSERT CHEST TUBE Right 09/13/2022    Procedure: Insert chest tube;  Surgeon: Jose R Mccullough MD;  Location: [facility]    IR CVC TUNNEL PLACEMENT > 5 YRS OF AGE  09/26/2022    IR CVC TUNNEL PLACEMENT > 5 YRS OF AGE  2/14/2024    IR GASTRO JEJUNOSTOMY TUBE CHANGE  08/31/2022    IR GASTRO JEJUNOSTOMY TUBE CHANGE  12/21/2022    IR GASTRO JEJUNOSTOMY TUBE CHANGE  07/12/2023    IR GASTRO JEJUNOSTOMY TUBE CHANGE  08/18/2023    IR GASTRO JEJUNOSTOMY TUBE CHANGE  11/14/2023    IR GASTRO JEJUNOSTOMY TUBE PLACEMENT  07/27/2022    IR THORACENTESIS  08/29/2022    LEEP TX, CERVICAL  04/07/2017    HECTOR III    LYMPH NODE BIOPSY Left 2005    Left axilla, benign- Paac Ciinak    MIDLINE INSERTION - DOUBLE LUMEN Left 07/28/2022    20cm, Basilic vein    REPLACE GASTROJEJUNOSTOMY TUBE, PERCUTANEOUS  10/07/2022    Procedure: Replace Gastrojejunostomy Tube;  Surgeon: Cosmo Arroyo MD;  [facility]    THORACENTESIS Left 08/29/2022    Procedure: THORACENTESIS;  Surgeon: Bo Capone PA-C;  Location: [facility] OR    THORACENTESIS Left 09/13/2022    Procedure: Thoracentesis;  Surgeon: Jose R Mccullough MD;  [facility]: [facility]    THROMBECTOMY UPPER EXTREMITY Left 07/02/2022    Procedure: LEFT RADIAL ARM THROMBECTOMY;  Surgeon: Christie Graham MD;  [facility]    TRANSPLANT LUNG RECIPIENT SINGLE X2 Bilateral 06/28/2022    Procedure: Clamshell Incision, Bilateral Sequential Lung Transplant, On Cardiopulmonary Bypass, Flexible Bronchoscopy;  Surgeon: Sue Sunshine MD;  Location: [facility]       Medications   I have personally reviewed the patient's medication list.     Allergies  I have personally reviewed the patient's allergy list.     Social History  Reviewed    Family History    Reviewed      Physical Examination   Vitals: /57 (BP  "Location: Right arm)   Pulse 104   Temp 99  F (37.2  C) (Oral)   Resp 16   Ht 1.57 m (5' 1.81\")   Wt 47.1 kg (103 lb 13.4 oz)   SpO2 94%   BMI 19.11 kg/m    General: Lying in bed, NAD on O2  Head: NC/AT  Eyes: no icterus, op pink and moist  Cardiac: RRR. Extremities warm, no edema.   Respiratory: non-labored on RA  GI: S/NT/ND  Skin: No rash or lesion on exposed skin  Psych: Mood pleasant, affect congruent  Neuro:  Mental status: Awake, alert, attentive, oriented to self, time, place, and circumstance. Language is fluent and coherent with intact comprehension of complex commands, naming and repetition.  Cranial nerves: VFF, PERRL, conjugate gaze, EOMI, facial sensation intact, face symmetric, shoulder shrug strong, tongue/uvula midline, no dysarthria.   Motor: Normal  tone and decreased bulk. No abnormal movements.  4+ /5 strength bilaterally in deltoids, biceps, triceps, hand , hip flexors, hip extensors, knee flexion, knee extension, plantarflexion, dorsiflexion.  5 out of 5 strength with neck flexion (confounded by pain but able to give 5 out of 5 initially) and 5 out of 5 strength in neck extension  Reflexes: Hypo-reflexic and symmetric biceps, brachioradialis, triceps, patellae, and achilles. no clonus, t bilateral upgoing toes.  Sensory: Intact to light touch, in proximal and distal aspects of all 4 extremities   Coordination: FNF without dysmetria.   Gait: Deferred.    Investigations   I have personally reviewed pertinent labs, tests, and radiological imaging. Discussion of notable findings is included under Impression.     Was patient transferred from outside hospital?   No    Impression  #Acute hypoxic and hypercarbic respiratory failure.  On examining the patient it was reassuring that she had a nonfocal neurological exam.  But it was concerning that the patient had bilateral upgoing toes.  The question the primary team.  Neurology is very valid, as the patient is clearly retaining carbon " dioxide and is not having a physiological central response to increased respiratory rate to blow off CO2.  It is hard to localize a specific area of the brain that can cause such as a condition.  A brainstem lesion in the medullary respiratory center would result in something like an Ondines curse where the patient would have to voluntarily consciously breathe in order to maintain respirations but this is not the case here as the patient is able to breathe and looks comfortable.  Other differential would be a neuromuscular process where the patient's muscles are not working enough to help blow off CO2.  The patient had strong neck flexion and extension which would make us think that the patient's diaphragm is also strong as they are both supplied by the same nerve root.  And on exam the patient did not have any decrement of muscle activation on sustained testing and larger muscles or eyelids to make a suspicious of conditions like myasthenia gravis.  For now we would recommend getting an MRI of the brain with and without contrast to look for any central lesions that can explain the patient's symptoms and he would like to include the DWI sequence to get a better look at the patient's brainstem.  In addition we would also recommend getting a negative inspiratory force/FVC twice daily to give us any idea if there is any neuromuscular weakness.  General neurology will continue to follow and give recommendations as needed.  Kindly call us with any questions/concerns.    Recommendations  -MRI brain with and without contrast with coronal DWI sequence (to look for brainstem lesions).  -Negative inspiratory force/FVC twice daily with the help of RT.  -Neurology will continue to follow and give recommendations as needed.    Thank you for involving Neurology in the care of Sofie Rodriguez.  Please do not hesitate to call with questions/concerns (consult pager [phone]).      Patient was seen and discussed with Dr. Chauhan,  MD.    Terry Durant MD  Neurology resident PGY 3  [facility].

## 2024-02-27 NOTE — PROGRESS NOTES
Date: 2/27/2024  Time: 1150     Data:  Pre Wt:   47.1 kg as of 2/26/24  Desired Wt:   To be established  Post Wt:  44.1 kg (estimated)  Weight change: - 3 kg  Ultrafiltration - Post Run Net Total Removed (mL):  3000 ml  Vascular Access Status: Fistula patent  Dialyzer Rinse:  Light  Total Blood Volume Processed: 75.58 L   Total Dialysis (Treatment) Time:   3.45 Hrs  Dialysate Bath: K 3, Ca 2.25  Heparin: Heparin: None     Lab:   Yes   CBC/ Lactic acid    Interventions:  Dialysis done through L AV Fistula using 15 gauge needles  UF set to 3 Liters, accommodating priming and rinse back volumes  ,     See MAR for meds given    See Flowsheet for Crit Profile throughout the run    Treatment has ended safely and  blood is rinsed back completely  Decannulation done post HD, hemostasis is achieved in 15 minutes  Pressure dressing is applied, to be removed after 4-6 hours  Report given to PCN, sent back to her  room in stable condition     Assessment:  Intermittently confused, @25 HFNC, calm and cooperative, denies pain  L arm AV Fistula has good thrill and bruit                Plan:    Per Renal team        Brandee Clark, MISAELN, RN  Acute Dialysis RN  [facility]

## 2024-02-28 ENCOUNTER — APPOINTMENT (OUTPATIENT)
Dept: GENERAL RADIOLOGY | Facility: CLINIC | Age: 64
DRG: 640 | End: 2024-02-28
Attending: INTERNAL MEDICINE
Payer: MEDICARE

## 2024-02-28 LAB
ALBUMIN SERPL BCG-MCNC: 3.4 G/DL (ref 3.5–5.2)
ALLEN'S TEST: YES
ALP SERPL-CCNC: 81 U/L (ref 40–150)
ALT SERPL W P-5'-P-CCNC: 10 U/L (ref 0–50)
ANION GAP SERPL CALCULATED.3IONS-SCNC: 9 MMOL/L (ref 7–15)
AST SERPL W P-5'-P-CCNC: 17 U/L (ref 0–45)
BACTERIA BLD CULT: NO GROWTH
BASE EXCESS BLDA CALC-SCNC: 4.7 MMOL/L (ref -3–3)
BASE EXCESS BLDV CALC-SCNC: 3.9 MMOL/L (ref -3–3)
BASE EXCESS BLDV CALC-SCNC: 5.2 MMOL/L (ref -3–3)
BASE EXCESS BLDV CALC-SCNC: 5.8 MMOL/L (ref -3–3)
BASE EXCESS BLDV CALC-SCNC: 6.2 MMOL/L (ref -3–3)
BASE EXCESS BLDV CALC-SCNC: 6.4 MMOL/L (ref -3–3)
BASOPHILS # BLD AUTO: ABNORMAL 10*3/UL
BASOPHILS # BLD MANUAL: 0 10E3/UL (ref 0–0.2)
BASOPHILS NFR BLD AUTO: ABNORMAL %
BASOPHILS NFR BLD MANUAL: 0 %
BILIRUB SERPL-MCNC: 0.2 MG/DL
BUN SERPL-MCNC: 50.7 MG/DL (ref 8–23)
CA-I BLD-MCNC: 5 MG/DL (ref 4.4–5.2)
CALCIUM SERPL-MCNC: 8.5 MG/DL (ref 8.8–10.2)
CHLORIDE SERPL-SCNC: 98 MMOL/L (ref 98–107)
COHGB MFR BLD: 89.4 % (ref 96–97)
CREAT SERPL-MCNC: 2.6 MG/DL (ref 0.51–0.95)
DEPRECATED HCO3 PLAS-SCNC: 32 MMOL/L (ref 22–29)
EGFRCR SERPLBLD CKD-EPI 2021: 20 ML/MIN/1.73M2
EOSINOPHIL # BLD AUTO: ABNORMAL 10*3/UL
EOSINOPHIL # BLD MANUAL: 0 10E3/UL (ref 0–0.7)
EOSINOPHIL NFR BLD AUTO: ABNORMAL %
EOSINOPHIL NFR BLD MANUAL: 0 %
ERYTHROCYTE [DISTWIDTH] IN BLOOD BY AUTOMATED COUNT: 20.6 % (ref 10–15)
GLUCOSE BLDC GLUCOMTR-MCNC: 123 MG/DL (ref 70–99)
GLUCOSE BLDC GLUCOMTR-MCNC: 146 MG/DL (ref 70–99)
GLUCOSE BLDC GLUCOMTR-MCNC: 70 MG/DL (ref 70–99)
GLUCOSE BLDC GLUCOMTR-MCNC: 76 MG/DL (ref 70–99)
GLUCOSE BLDC GLUCOMTR-MCNC: 99 MG/DL (ref 70–99)
GLUCOSE SERPL-MCNC: 279 MG/DL (ref 70–99)
HCO3 BLD-SCNC: 35 MMOL/L (ref 21–28)
HCO3 BLDV-SCNC: 34 MMOL/L (ref 21–28)
HCO3 BLDV-SCNC: 35 MMOL/L (ref 21–28)
HCO3 BLDV-SCNC: 36 MMOL/L (ref 21–28)
HCT VFR BLD AUTO: 28.1 % (ref 35–47)
HGB BLD-MCNC: 8.2 G/DL (ref 11.7–15.7)
IMM GRANULOCYTES # BLD: ABNORMAL 10*3/UL
IMM GRANULOCYTES NFR BLD: ABNORMAL %
LACTATE SERPL-SCNC: 0.3 MMOL/L (ref 0.7–2)
LYMPHOCYTES # BLD AUTO: ABNORMAL 10*3/UL
LYMPHOCYTES # BLD MANUAL: 0.1 10E3/UL (ref 0.8–5.3)
LYMPHOCYTES NFR BLD AUTO: ABNORMAL %
LYMPHOCYTES NFR BLD MANUAL: 2 %
MAGNESIUM SERPL-MCNC: 1.9 MG/DL (ref 1.7–2.3)
MAGNESIUM SERPL-MCNC: 2 MG/DL (ref 1.7–2.3)
MCH RBC QN AUTO: 38.3 PG (ref 26.5–33)
MCHC RBC AUTO-ENTMCNC: 29.2 G/DL (ref 31.5–36.5)
MCV RBC AUTO: 131 FL (ref 78–100)
METAMYELOCYTES # BLD MANUAL: 0.1 10E3/UL
METAMYELOCYTES NFR BLD MANUAL: 2 %
MONOCYTES # BLD AUTO: ABNORMAL 10*3/UL
MONOCYTES # BLD MANUAL: 0.4 10E3/UL (ref 0–1.3)
MONOCYTES NFR BLD AUTO: ABNORMAL %
MONOCYTES NFR BLD MANUAL: 6 %
MYELOCYTES # BLD MANUAL: 0.3 10E3/UL
MYELOCYTES NFR BLD MANUAL: 4 %
NEUTROPHILS # BLD AUTO: ABNORMAL 10*3/UL
NEUTROPHILS # BLD MANUAL: 6.1 10E3/UL (ref 1.6–8.3)
NEUTROPHILS NFR BLD AUTO: ABNORMAL %
NEUTROPHILS NFR BLD MANUAL: 86 %
NRBC # BLD AUTO: 0.1 10E3/UL
NRBC # BLD AUTO: 0.1 10E3/UL
NRBC BLD AUTO-RTO: 2 /100
NRBC BLD MANUAL-RTO: 1 %
O2/TOTAL GAS SETTING VFR VENT: 0 %
O2/TOTAL GAS SETTING VFR VENT: 35 %
OXYHGB MFR BLDV: 75 % (ref 70–75)
OXYHGB MFR BLDV: 79 % (ref 70–75)
OXYHGB MFR BLDV: 79 % (ref 70–75)
OXYHGB MFR BLDV: 80 % (ref 70–75)
OXYHGB MFR BLDV: 83 % (ref 70–75)
PCO2 BLD: 97 MM HG (ref 35–45)
PCO2 BLDV: 108 MM HG (ref 40–50)
PCO2 BLDV: 67 MM HG (ref 40–50)
PCO2 BLDV: 88 MM HG (ref 40–50)
PCO2 BLDV: 90 MM HG (ref 40–50)
PCO2 BLDV: 99 MM HG (ref 40–50)
PEEP: 8 CM H2O
PH BLD: 7.16 [PH] (ref 7.35–7.45)
PH BLDV: 7.12 [PH] (ref 7.32–7.43)
PH BLDV: 7.17 [PH] (ref 7.32–7.43)
PH BLDV: 7.19 [PH] (ref 7.32–7.43)
PH BLDV: 7.2 [PH] (ref 7.32–7.43)
PH BLDV: 7.31 [PH] (ref 7.32–7.43)
PHOSPHATE SERPL-MCNC: 4.3 MG/DL (ref 2.5–4.5)
PHOSPHATE SERPL-MCNC: 4.6 MG/DL (ref 2.5–4.5)
PLAT MORPH BLD: ABNORMAL
PLATELET # BLD AUTO: 293 10E3/UL (ref 150–450)
PO2 BLD: 60 MM HG (ref 80–105)
PO2 BLDV: 42 MM HG (ref 25–47)
PO2 BLDV: 49 MM HG (ref 25–47)
PO2 BLDV: 51 MM HG (ref 25–47)
PO2 BLDV: 53 MM HG (ref 25–47)
PO2 BLDV: 54 MM HG (ref 25–47)
POLYCHROMASIA BLD QL SMEAR: SLIGHT
POTASSIUM SERPL-SCNC: 3.4 MMOL/L (ref 3.4–5.3)
PROCALCITONIN SERPL IA-MCNC: 1.56 NG/ML
PROT SERPL-MCNC: 6 G/DL (ref 6.4–8.3)
RBC # BLD AUTO: 2.14 10E6/UL (ref 3.8–5.2)
RBC MORPH BLD: ABNORMAL
SAO2 % BLDA: 86 % (ref 92–100)
SAO2 % BLDV: 78.2 % (ref 70–75)
SAO2 % BLDV: 82.1 % (ref 70–75)
SAO2 % BLDV: 82.4 % (ref 70–75)
SAO2 % BLDV: 82.5 % (ref 70–75)
SAO2 % BLDV: 86.3 % (ref 70–75)
SODIUM SERPL-SCNC: 139 MMOL/L (ref 135–145)
T3 SERPL-MCNC: 32 NG/DL (ref 85–202)
T4 FREE SERPL-MCNC: 0.69 NG/DL (ref 0.9–1.7)
TACROLIMUS BLD-MCNC: 6.2 UG/L (ref 5–15)
TME LAST DOSE: NORMAL H
TME LAST DOSE: NORMAL H
TSH SERPL DL<=0.005 MIU/L-ACNC: 4.85 UIU/ML (ref 0.3–4.2)
WBC # BLD AUTO: 7.1 10E3/UL (ref 4–11)

## 2024-02-28 PROCEDURE — 83735 ASSAY OF MAGNESIUM: CPT

## 2024-02-28 PROCEDURE — 250N000013 HC RX MED GY IP 250 OP 250 PS 637

## 2024-02-28 PROCEDURE — 250N000011 HC RX IP 250 OP 636

## 2024-02-28 PROCEDURE — 80197 ASSAY OF TACROLIMUS: CPT | Performed by: INTERNAL MEDICINE

## 2024-02-28 PROCEDURE — 83605 ASSAY OF LACTIC ACID: CPT

## 2024-02-28 PROCEDURE — 999N000157 HC STATISTIC RCP TIME EA 10 MIN

## 2024-02-28 PROCEDURE — 250N000009 HC RX 250

## 2024-02-28 PROCEDURE — 250N000011 HC RX IP 250 OP 636: Performed by: INTERNAL MEDICINE

## 2024-02-28 PROCEDURE — 84100 ASSAY OF PHOSPHORUS: CPT

## 2024-02-28 PROCEDURE — 200N000002 HC R&B ICU UMMC

## 2024-02-28 PROCEDURE — 94640 AIRWAY INHALATION TREATMENT: CPT

## 2024-02-28 PROCEDURE — 250N000012 HC RX MED GY IP 250 OP 636 PS 637: Performed by: INTERNAL MEDICINE

## 2024-02-28 PROCEDURE — 250N000013 HC RX MED GY IP 250 OP 250 PS 637: Performed by: INTERNAL MEDICINE

## 2024-02-28 PROCEDURE — 85007 BL SMEAR W/DIFF WBC COUNT: CPT

## 2024-02-28 PROCEDURE — 36592 COLLECT BLOOD FROM PICC: CPT | Performed by: INTERNAL MEDICINE

## 2024-02-28 PROCEDURE — 99207 PR NO BILLABLE SERVICE THIS VISIT: CPT

## 2024-02-28 PROCEDURE — 999N000127 HC STATISTIC PERIPHERAL IV START W US GUIDANCE

## 2024-02-28 PROCEDURE — 94660 CPAP INITIATION&MGMT: CPT

## 2024-02-28 PROCEDURE — 250N000009 HC RX 250: Performed by: INTERNAL MEDICINE

## 2024-02-28 PROCEDURE — 36415 COLL VENOUS BLD VENIPUNCTURE: CPT

## 2024-02-28 PROCEDURE — 82805 BLOOD GASES W/O2 SATURATION: CPT

## 2024-02-28 PROCEDURE — 82330 ASSAY OF CALCIUM: CPT

## 2024-02-28 PROCEDURE — 84439 ASSAY OF FREE THYROXINE: CPT

## 2024-02-28 PROCEDURE — 82805 BLOOD GASES W/O2 SATURATION: CPT | Performed by: INTERNAL MEDICINE

## 2024-02-28 PROCEDURE — 99292 CRITICAL CARE ADDL 30 MIN: CPT | Mod: FS

## 2024-02-28 PROCEDURE — 99233 SBSQ HOSP IP/OBS HIGH 50: CPT | Mod: GC | Performed by: INTERNAL MEDICINE

## 2024-02-28 PROCEDURE — 87040 BLOOD CULTURE FOR BACTERIA: CPT | Performed by: INTERNAL MEDICINE

## 2024-02-28 PROCEDURE — 258N000003 HC RX IP 258 OP 636: Performed by: INTERNAL MEDICINE

## 2024-02-28 PROCEDURE — 250N000012 HC RX MED GY IP 250 OP 636 PS 637: Performed by: NURSE PRACTITIONER

## 2024-02-28 PROCEDURE — 99233 SBSQ HOSP IP/OBS HIGH 50: CPT | Mod: FS | Performed by: NURSE PRACTITIONER

## 2024-02-28 PROCEDURE — 36415 COLL VENOUS BLD VENIPUNCTURE: CPT | Performed by: INTERNAL MEDICINE

## 2024-02-28 PROCEDURE — 84145 PROCALCITONIN (PCT): CPT

## 2024-02-28 PROCEDURE — 99233 SBSQ HOSP IP/OBS HIGH 50: CPT | Mod: FS | Performed by: PHYSICIAN ASSISTANT

## 2024-02-28 PROCEDURE — 258N000003 HC RX IP 258 OP 636

## 2024-02-28 PROCEDURE — 99291 CRITICAL CARE FIRST HOUR: CPT | Mod: FS

## 2024-02-28 PROCEDURE — 258N000001 HC RX 258

## 2024-02-28 PROCEDURE — 71045 X-RAY EXAM CHEST 1 VIEW: CPT

## 2024-02-28 PROCEDURE — 84443 ASSAY THYROID STIM HORMONE: CPT

## 2024-02-28 PROCEDURE — 94640 AIRWAY INHALATION TREATMENT: CPT | Mod: 76

## 2024-02-28 PROCEDURE — 71045 X-RAY EXAM CHEST 1 VIEW: CPT | Mod: 26 | Performed by: RADIOLOGY

## 2024-02-28 PROCEDURE — 85027 COMPLETE CBC AUTOMATED: CPT

## 2024-02-28 PROCEDURE — 36600 WITHDRAWAL OF ARTERIAL BLOOD: CPT

## 2024-02-28 PROCEDURE — 250N000012 HC RX MED GY IP 250 OP 636 PS 637

## 2024-02-28 PROCEDURE — 80053 COMPREHEN METABOLIC PANEL: CPT | Performed by: INTERNAL MEDICINE

## 2024-02-28 PROCEDURE — 250N000009 HC RX 250: Performed by: NURSE PRACTITIONER

## 2024-02-28 PROCEDURE — 84480 ASSAY TRIIODOTHYRONINE (T3): CPT

## 2024-02-28 RX ORDER — DEXTROSE MONOHYDRATE 25 G/50ML
25-50 INJECTION, SOLUTION INTRAVENOUS
Status: DISCONTINUED | OUTPATIENT
Start: 2024-02-28 | End: 2024-02-28

## 2024-02-28 RX ORDER — THEOPHYLLINE 80 MG/15ML
100 SOLUTION ORAL EVERY 8 HOURS SCHEDULED
Status: DISCONTINUED | OUTPATIENT
Start: 2024-02-28 | End: 2024-03-02

## 2024-02-28 RX ORDER — DEXTROSE MONOHYDRATE 25 G/50ML
25-50 INJECTION, SOLUTION INTRAVENOUS
Status: DISCONTINUED | OUTPATIENT
Start: 2024-02-28 | End: 2024-03-18

## 2024-02-28 RX ORDER — MAGNESIUM SULFATE 1 G/100ML
1 INJECTION INTRAVENOUS ONCE
Status: COMPLETED | OUTPATIENT
Start: 2024-02-28 | End: 2024-02-28

## 2024-02-28 RX ORDER — DEXTROSE MONOHYDRATE 100 MG/ML
INJECTION, SOLUTION INTRAVENOUS CONTINUOUS PRN
Status: DISCONTINUED | OUTPATIENT
Start: 2024-02-28 | End: 2024-04-15 | Stop reason: HOSPADM

## 2024-02-28 RX ORDER — CARBOXYMETHYLCELLULOSE SODIUM 5 MG/ML
1 SOLUTION/ DROPS OPHTHALMIC
Status: DISCONTINUED | OUTPATIENT
Start: 2024-02-28 | End: 2024-04-15 | Stop reason: HOSPADM

## 2024-02-28 RX ORDER — POTASSIUM CHLORIDE 20MEQ/15ML
20 LIQUID (ML) ORAL ONCE
Status: COMPLETED | OUTPATIENT
Start: 2024-02-28 | End: 2024-02-28

## 2024-02-28 RX ORDER — NICOTINE POLACRILEX 4 MG
15-30 LOZENGE BUCCAL
Status: DISCONTINUED | OUTPATIENT
Start: 2024-02-28 | End: 2024-02-28

## 2024-02-28 RX ORDER — NICOTINE POLACRILEX 4 MG
15-30 LOZENGE BUCCAL
Status: DISCONTINUED | OUTPATIENT
Start: 2024-02-28 | End: 2024-03-18

## 2024-02-28 RX ADMIN — CALCIUM CARBONATE 600 MG (1,500 MG)-VITAMIN D3 400 UNIT TABLET 1 TABLET: at 17:16

## 2024-02-28 RX ADMIN — CYANOCOBALAMIN TAB 500 MCG 500 MCG: 500 TAB at 09:05

## 2024-02-28 RX ADMIN — AMINOPHYLLINE 200 MG: 25 INJECTION, SOLUTION INTRAVENOUS at 17:16

## 2024-02-28 RX ADMIN — HEPARIN SODIUM 5000 UNITS: 5000 INJECTION, SOLUTION INTRAVENOUS; SUBCUTANEOUS at 00:36

## 2024-02-28 RX ADMIN — HEPARIN SODIUM 5000 UNITS: 5000 INJECTION, SOLUTION INTRAVENOUS; SUBCUTANEOUS at 11:19

## 2024-02-28 RX ADMIN — DAPSONE 50 MG: 100 TABLET ORAL at 09:06

## 2024-02-28 RX ADMIN — CALCIUM CARBONATE 600 MG (1,500 MG)-VITAMIN D3 400 UNIT TABLET 1 TABLET: at 09:05

## 2024-02-28 RX ADMIN — Medication 40 MG: at 09:06

## 2024-02-28 RX ADMIN — THEOPHYLLINE 100 MG: 80 SOLUTION ORAL at 22:27

## 2024-02-28 RX ADMIN — LEVALBUTEROL HYDROCHLORIDE 1.25 MG: 1.25 SOLUTION RESPIRATORY (INHALATION) at 08:47

## 2024-02-28 RX ADMIN — TACROLIMUS 4.5 MG: 5 CAPSULE ORAL at 17:17

## 2024-02-28 RX ADMIN — CALCIUM CARBONATE 600 MG (1,500 MG)-VITAMIN D3 400 UNIT TABLET 1 TABLET: at 11:23

## 2024-02-28 RX ADMIN — POTASSIUM CHLORIDE 20 MEQ: 40 SOLUTION ORAL at 15:28

## 2024-02-28 RX ADMIN — Medication 40 MG: at 20:54

## 2024-02-28 RX ADMIN — MAGNESIUM SULFATE HEPTAHYDRATE 1 G: 1 INJECTION, SOLUTION INTRAVENOUS at 15:36

## 2024-02-28 RX ADMIN — PREDNISONE 2.5 MG: 2.5 TABLET ORAL at 20:54

## 2024-02-28 RX ADMIN — HEPARIN SODIUM 5000 UNITS: 5000 INJECTION, SOLUTION INTRAVENOUS; SUBCUTANEOUS at 23:41

## 2024-02-28 RX ADMIN — TACROLIMUS 4 MG: 5 CAPSULE ORAL at 09:06

## 2024-02-28 RX ADMIN — METOPROLOL TARTRATE 25 MG: 100 TABLET, FILM COATED ORAL at 09:05

## 2024-02-28 RX ADMIN — SODIUM CHLORIDE, POTASSIUM CHLORIDE, SODIUM LACTATE AND CALCIUM CHLORIDE 250 ML: 600; 310; 30; 20 INJECTION, SOLUTION INTRAVENOUS at 12:47

## 2024-02-28 RX ADMIN — SODIUM BICARBONATE 650 MG: 650 TABLET ORAL at 11:23

## 2024-02-28 RX ADMIN — MAGNESIUM SULFATE HEPTAHYDRATE: 500 INJECTION, SOLUTION INTRAMUSCULAR; INTRAVENOUS at 22:07

## 2024-02-28 RX ADMIN — LEVALBUTEROL HYDROCHLORIDE 1.25 MG: 1.25 SOLUTION RESPIRATORY (INHALATION) at 20:26

## 2024-02-28 RX ADMIN — THEOPHYLLINE 100 MG: 80 SOLUTION ORAL at 17:16

## 2024-02-28 RX ADMIN — PREDNISONE 5 MG: 5 TABLET ORAL at 09:05

## 2024-02-28 RX ADMIN — DEXTROSE MONOHYDRATE 1000 ML: 100 INJECTION, SOLUTION INTRAVENOUS at 12:40

## 2024-02-28 RX ADMIN — SODIUM BICARBONATE 650 MG: 650 TABLET ORAL at 09:05

## 2024-02-28 RX ADMIN — LIDOCAINE 4% 1 PATCH: 40 PATCH TOPICAL at 20:59

## 2024-02-28 ASSESSMENT — ACTIVITIES OF DAILY LIVING (ADL)
ADLS_ACUITY_SCORE: 36
ADLS_ACUITY_SCORE: 37
ADLS_ACUITY_SCORE: 36
ADLS_ACUITY_SCORE: 37
ADLS_ACUITY_SCORE: 37
ADLS_ACUITY_SCORE: 36
ADLS_ACUITY_SCORE: 37
ADLS_ACUITY_SCORE: 36
ADLS_ACUITY_SCORE: 37
ADLS_ACUITY_SCORE: 36
ADLS_ACUITY_SCORE: 36
ADLS_ACUITY_SCORE: 37
ADLS_ACUITY_SCORE: 36
ADLS_ACUITY_SCORE: 37
ADLS_ACUITY_SCORE: 36
ADLS_ACUITY_SCORE: 36
ADLS_ACUITY_SCORE: 37

## 2024-02-28 NOTE — PROGRESS NOTES
Calorie Count  Intake recorded for: 2/27  Total Kcals: 0 Total Protein: 0g  Kcals from Hospital Food: 0   Protein: 0g  Kcals from Outside Food (average):0 Protein: 0g  # Meals Ordered from Kitchen: 0  # Meals Recorded: No food intake recorded, pt is NPO  # Supplements Recorded: 0

## 2024-02-28 NOTE — PROGRESS NOTES
MICU STAFF CRITICAL CARE PROGRESS NOTE:    I saw and examined the patient with the MICU team and concur with findings and A&P as detailed in Kalpana HOOPER's note of today    64 yo  PMH COPD s/p bilat lung txpl 6/22 complicated by HD palsy, recurrent pneumonias; severe malnutrtion, ESRD on HD;  Admitted 2/10 to [facility] for TPN for malnutrition. Developed acute hypoxic and hypercapnic resp failure with intubation 2/17, extubated 2/19 and ate cheesburger & tater tots witin an hour.  Moved to floor but has developed gradually worsening hypercapnia, decreased mental status and VBG PCO2. She had been refusing BiPAP on floor due to claustrophobia.  High Flow NC was not helpful.  Was on oral bicarbonate (??).  Had nl serum HCO3 22-26 up until last few days.  Last night became more somnolent with VBGs showing rising PCO2 and lower pH  HD yesterday    PMH:  Post bilat lung Txpl on prednisone 5 AM / 2.5 PM; [place] 4.5, &   Prophylaxis dapsone  R HD paralysis post op  Gastroparesis and small bowel dysmotility of unclear etiology (? Vagal injury)  Malnutrition with 40lb weight loss over past year  ? Chonic osmotic diarrhea rx'd with rifaxamine and possible SIBO  CT enterography recommended (? @ [place])  ESRD on HD  Prior loculated L pleural effusoins and trace effusions    FH / SH:  FH non-contributory; ; former smoker quit 2020; no current EtOH/drugs;  Has 1 daughter (Selina)    Afebrile HR 70s RR 20-25; MAPs 60-65  Frail, thin woman on PPV (BMI 17)  BiPAP AVAPS 400 10-25 Pressure 35% EPAP initially 8, reduced to 5 and sats OK %  Responding appropriately to all questions, somewhat lethargic, easilty arousable, Ox3 with occ  Diminished BS no crackles, wheeze, rub;  non-dyspneic   Upper neck bruise from prior fall  RRR w/o m/g/r  Abd mildly pouched abd with active BS, non-tender, no masses/megaly  Mild peripheral edema  No other skin lesions  Up 1.8L since midnight due to TPN  Inconsistent weights and I:O   (Up 6kg with 3L removed??)    Hgb 8.2;  WBC 7.1 low Lymphs; no L shift;  Plts 293K stable  Na 139 3.4 (4.1) HCO3 32 (23) 51(72)/2.6  Mg 1.9 PO4 4.3;  Alb 3.4 TP 6.0  Nl LFTs  Jarett 5.0  Procal 2.94 2 days ago 1.96;  lactate 0.6  ABG 7.16/97/60 - predominant chronic with some acute resp acidosis  CXR: Abnl L CPA  Micro NGTD (last BC 2/23) Histo & Crypto Ag negative  Prior h/o Candida glabrata 2/17    Chronic Resp Failure / s/p Bilateral Lung Txpl for COPD  Previously had refused BiPAP  Typical recent PCO2 80-90 on VBG and since txpl best VBG PCO2 ~ 60;  No evidence of infection  Respiratory muscle dysfunction may be partially malnutritional  Patient would accept temporary intubatoin and potential trach if necessary (need input from Lung Txpl service)  P: stop oral HCO3 (unless renal convinces us otherwise)  Avoid respiratory depressants (but not on any)  Needs good nutrition to help resp muscles (slow)  Ensure HD is sufficient - need to minimize pulm edema  Check TFTs, replete Mg;   Theophylline - start with 300 mgs oral daily and check level (removed by HD)  Consider doxpram  Normalize Mg  Discuss other Rx with Lung Transplant service - could she have component of worsening OB/GVHD??  Metabolic cart study pending to assess CO2 production rate and if TPN is worsening this  She may need trach if we can't get significant improvement that is sustained    ESRD   On HD - important to get to dry weight quickly to help avoid need for intubation    Borderline Hypotension  Got metoprolol this AM   Given 250 ml LR bolus  Don't want to give any excess fluids if possible  Echo - no reduced EF  Hopefully will have higher BP as metoprolol wears off and she wont need levophed and central line  Consider midodrine    Severe Malnutrition on TPN  With Pharmacy's help, concentrating TPN    ID / Recurrent Pneumonias / Immunosuppression  Was on Vanco/Zosyn 2/17-2/23 and Micafungin briefly  No fever or elevated WBC - will not start empiric  Abx @ present  Will obtain blood cultures and, if possible, sputum for gm stain and culture    Acute on Chronic Anemia  No acute    LUE Unitlateral Edema  Neg U/S on 2/18  Fistula on L - working well at present    Neuro / Mild Encephalopathy   Avoid sedative and depressant meds    PPI / subC heparin  Full code  CVC R internal jugular for TPN; periheral iv; PEG; L Fistula    (Critical Care 75 minutes cumulative thus far today)    Miguel Angel Stone MD  MICU Staff  [id number]

## 2024-02-28 NOTE — PLAN OF CARE
"/58 (BP Location: Right arm)   Pulse 96   Temp 97.8  F (36.6  C) (Axillary)   Resp 20   Ht 1.57 m (5' 1.81\")   Wt 47.1 kg (103 lb 13.4 oz)   SpO2 96%   BMI 19.11 kg/m   AVSS on BiPAP. Patient denies pain. Patient denies nausea. Urine Output - anuric, on HD.. Bowel Function - No BM this shift. Nutrition - NPO, Cycled TPN/IL have been running overnight. Pt slept well and kept BiPAP on for about 5 hours, then became restless & started taking BiPAP off. With BiPAP off Sats are about 88% on RA. We thought we could possibly put her back on highflow with a facemask and maybe she would tolerate this better now that she is awake but her blood gases this morning are worse than the previous set. So BiPAP needs to stay on for now. Pt is to have an MRI of the head today. Pt has been informed and the MRI checklist has been started. I will call MRI at 0700 when they open to see if they have a time for her MRI. Daughter Julia should be called for MRI checklist.                          "

## 2024-02-28 NOTE — PROGRESS NOTES
CLINICAL NUTRITION SERVICES - BRIEF NOTE    See RD note 2/26 for full assessment.     Nutrition Prescription    RECOMMENDATIONS FOR MDs/PROVIDERS TO ORDER:  None currently     Recommendations already ordered by Registered Dietitian (RD):  Dosing weight:  47.1 kg  Access: central    Initial parameters (per day)  Volume:  max concentration, continuous schedule  Dextrose: 195 g  AA: 70 g  SMOF Lipids: 250 mL 20%, 4 days per week     Additives: daily standard multivitamin and trace elements    Goal PN provides 195 g dextrose, 70 g AA, and 250 mL 20% lipids 4 days per week for total provision of 1228 Kcals (103% MREE, 26 Kcals/kg), 1.5 g/kg protein, GIR 2.8 mg/kg/minute, and 24% fat kcals on average daily.     Future/Additional Recommendations:  If patient remains NPO after about 48 hours, recommend resuming enteral nutrition support to help maintain gut integrity. If patient is able to resume PO in next 48 hours, do not need to resume enteral nutrition support.   Recommend continuing TPN to meet nutrition needs with PO as desired d/t high suspicion for malabsorption.   Monitor stool output       New findings:   Pt transferred to ICU, on Bipap currently and NPO.     Based on metabolic cart study 2/24 MREE = 1185 kcal/day ; RQ = 1.15. RQ logical given provisions from TPN in 24 hours preceding study, suspect PO is malabsorbing.     Diet: NPO    Pt off TF via GJ tube since 2/26.     TPN currently:  Dosing weight:  43.4 kg  Access: central  parameters (per day)  Volume:  1080 mL x 12 hour cycle  Dextrose: 195 g  AA: 65 g  Lipids: 250 mL SMOF 20%, 4 days per week   Additives: infuvite, tralement  Goal PN provides 195 g dextrose, 52 g AA, and 250 mL 20% lipids 4 days per week for total provision of 1209 Kcals (28 Kcals/kg), 1.5 g/kg protein, GIR 6.2 mg/kg/minute, and 24% fat kcals on average daily.     PO (prior to NPO):   2/23       Total Kcals: 1539     Total Protein: 52g   2/22       Total Kcals: 761       Total Protein:  17g   2/21       Total Kcals: 565       Total Protein: 24g     GI:  Last BM: 02/25/24  See provider note for details of complex GI history including gastroparesis, small bowel dysmotility     Weight:  Most Recent Weight: 47.1 kg (103 lb 13.4 oz)  on 2/26/24 via Bed scale  Body mass index is 19.11 kg/m .  Wt up 3.7 kg from admission.    Meds:  Calcium carbonate 600 mg vitamin D 10 mcg TID enteral; vitamin B12 500 mcg enteral  Prednisone  Renvela BID (held)  Tacrolimus  Sodium bicarb QID    Labs:  Vitamin C 14 (L) 2/20/24 however CRP 15.2 (H) - CRP above 10 can decrease serum vitamin C. Will continue parenteral multivitamin which provides 200 mg daily and monitor vitamin C level.    02/25/24 05:48 02/25/24 15:53 02/26/24 06:11 02/26/24 19:30 02/27/24 05:56 02/27/24 07:56 02/27/24 16:22 02/28/24 05:58   Sodium 135  139  137   139   Potassium 4.0  4.2  4.1   3.4   Chloride 101  103  103   98   Carbon Dioxide (CO2) 26  26  23   32 (H)   Urea Nitrogen 35.0 (H)  53.2 (H)  70.2 (H)   50.7 (H)   Creatinine 2.36 (H)  3.55 (H)  4.42 (H)   2.60 (H)   GFR Estimate 23 (L)  14 (L)  11 (L)   20 (L)   Calcium 8.7 (L)  9.0  9.3   8.5 (L)   Anion Gap 8  10  11   9   Magnesium 1.8 2.0 2.2 2.4 (H) 2.5 (H)  1.9 1.9   Phosphorus 3.9 3.8 4.9 (H) 5.3 (H) 5.2 (H)  2.6 4.3   Albumin 3.1 (L)  3.3 (L)  3.3 (L)   3.4 (L)   Protein Total 5.3 (L)  5.4 (L)  5.6 (L)   6.0 (L)   Alkaline Phosphatase 66  78  88   81   ALT 12  13  16   10   AST 25  19  21   17   Bilirubin Total 0.2  <0.2  <0.2   0.2   Glucose 124 (H)  159 (H)  152 (H)   279 (H)   Lactic Acid      0.4 (L)     Procalcitonin    0.94 (H)       Vitamin D, Total (25-Hydroxy)     46          Respiratory:   BIPAP    Renal:  iHD T,Th, Sat    Implementation  Collaboration with other providers  Parenteral Nutrition/IV Fluids - Modify composition and schedule       RD to follow per protocol      Christie Deras RDN, LD    4C (MICU) RD pager: [phone]  [facility] -  Clinical  Dietitian  Weekend/Holiday RD pager: [phone]

## 2024-02-28 NOTE — PROGRESS NOTES
" Shift 8704-3241  BP (!) 140/51 (BP Location: Right arm)   Pulse 103   Temp 98  F (36.7  C) (Axillary)   Resp 18   Ht 1.57 m (5' 1.81\")   Wt 47.1 kg (103 lb 13.4 oz)   SpO2 94%   BMI 19.11 kg/m        PT cont to have BIPAP on, 02 is sating in the high 90, but also cont to take mask off frequently, was encourage to keep mask on, but not easily redirected.  PT is alert and oriented with intermittent confusion. TPN started at 49 ml/hr was increased after 1 hr to 98ml/hr for 10 hrs.  PT denies pain and N/V. Will cont to monitor.   "

## 2024-02-28 NOTE — PROGRESS NOTES
Nephrology Progress Note  2024         Assessment & Recommendations:   Sofie Rodriguez is a 63 year old year old female with ESKD, COPD s/p b/l lung transplant in 2022 with multiple complications, gastroparesis s/p GJ tube, GIB, chronic diarrhea, recurrent c-diff, FTT with inability to tolerate any tube feeds, R hip fx s/p ORIF 2023, admitted on 2/10 for initiation of TPN/lipids, transferred to ICU on  with worsening mental status and respiratory acidosis in spite of bipap, ultimately intubated on , being treated for PNA, also with volume overload in setting of high volume TPN. Persistent respiratory acidosis in spite of volume off, transferred to ICU for hypotension and respiratory failure, thus far on bipap.    # ESKD - TTS, LUE AVG, 3hr, 45.5kg (will be lowered), [email or web address], Dr. Andres Fairbanks. She has been losing weight and now even below her recent set EDW.  - UF 6L total over the past two days, will hold off on HD today, plan for run tomorrow  - Requires EMLA cream an hour before HD       # Dialysis access: LUE AVG placed 3-4 months ago, per pt. She had arm swelling and a fistulogram a couple months ago and swelling improved but now has redeveloped.  - US with c/f possible steal syndrome  - per vascular surgery, no concern for steal syndrome, ok to go ahead with fistulogram  - given that AVF is working well on dialysis (450 BFR) and at this time IR is only able to perform fistulograms when AVF isn't working well, will defer to OP for management.    # Hypercapnia: VB.12/108/54/35   - intubated  in setting of hypercapnia in spite of bipap, extubated   - transferred to ICU , on bipap so far  - agree with PO bicarb to achieve pH of 7.20, though any bicarb will ultimately convert to CO2, so the respiratory issue will need to be addressed     # Volume/BP: Edema due to third spacing due to hypoalbuminemia. Anuric; on metoprolol soln 25 mg bid   - BP's were  "stable/hypertensive after dialysis yesterday, became hypotensive this morning, BP's 70-80's likely due to ongoing respiratory failure  - volume overload on CXR and on exam with 1+ pitting edema   - bed weights are quite variable, ?accuracy, no wt since 2/26  - 6L UF over the past two days, I/O not accurate as no tube feeds or TPN/lipids recorded the past 2 days  - continue to chart volume of TPN/lipids in the I/O   - already today pt has gotten 1475 ml TPN/lipids, pt will not tolerated the volume of more than 1.5L total TPN/lipids per day    #Lytes: K 3.4, Na 139    # Nutrition: on TPN   # FTT  # Acute on chronic diarrhea  - per team, concern is that pt isn't absorbing much PO or tube feeds with diarrhea increasing significantly with increase in tube feeds; thus needing TPN     # Anemia 2/2 ESKD  On Venofer 50 qwk, Mircera last dose 1/9/2024  - hgb 7-8's  - Will continue Venofer 50 mcg q week (Tuesday)  - increase epogen dose from 4000 to 8000 units (starting 2/20)    # BMD : Ca 8-9's, phos 4.3, alb 3.4  - renvela is held, will restart if phos remains elevated           Recommendations were communicated to primary team via note and RABIA Ward   Division of Renal Disease and Hypertension  [phone]    Interval History :   Seen bedside, worsening hypercapnia and hypotension, moved to ICU, now on bipap. BP's were stable and even hypertensive post HD yesterday, became hypotensive this AM, likely due to ongoing respiratory failure. I/O not accurate, no TPN or tube feeds recorded that last two days. No n/v, CP    Review of Systems:   Limited, pt on bipap    Physical Exam:   I/O last 3 completed shifts:  In: 1505.95 [I.V.:30]  Out: 3000 [Other:3000]   BP (!) 81/48   Pulse 79   Temp 98.6  F (37  C) (Axillary)   Resp 21   Ht 1.57 m (5' 1.81\")   Wt 47.1 kg (103 lb 13.4 oz)   SpO2 99%   BMI 19.11 kg/m       GENERAL APPEARANCE: NAD  PULM: on bipap  CV: regular rhythm, normal rate, no rub     -1+ " pitting edema lower extremities, (LUE (fistula arm) > RUE)  GI: soft   INTEGUMENT: no cyanosis, no rash  NEURO: calm  Access Left AVG     Labs:   All labs reviewed by me  Electrolytes/Renal -   Recent Labs   Lab Test 02/28/24  1147 02/28/24  1101 02/28/24  0558 02/27/24  1622 02/27/24  0556 02/26/24  1930 02/26/24  0611   NA  --   --  139  --  137  --  139   POTASSIUM  --   --  3.4  --  4.1  --  4.2   CHLORIDE  --   --  98  --  103  --  103   CO2  --   --  32*  --  23  --  26   BUN  --   --  50.7*  --  70.2*  --  53.2*   CR  --   --  2.60*  --  4.42*  --  3.55*   GLC 70 76 279*  --  152*  --  159*   ESTUARDO  --   --  8.5*  --  9.3  --  9.0   MAG  --   --  1.9 1.9 2.5*   < > 2.2   PHOS  --   --  4.3 2.6 5.2*   < > 4.9*    < > = values in this interval not displayed.       CBC -   Recent Labs   Lab Test 02/28/24  0558 02/27/24  0756 02/27/24  0556   WBC 7.1 8.0 7.9   HGB 8.2* 7.9* 7.8*    283 280       LFTs -   Recent Labs   Lab Test 02/28/24  0558 02/27/24  0556 02/26/24  0611   ALKPHOS 81 88 78   BILITOTAL 0.2 <0.2 <0.2   ALT 10 16 13   AST 17 21 19   PROTTOTAL 6.0* 5.6* 5.4*   ALBUMIN 3.4* 3.3* 3.3*       Iron Panel -   Recent Labs   Lab Test 09/26/22  0555 09/03/22  1039 08/24/22  0810   IRON 54 21* 41   IRONSAT 22 9* 21   CARLOS 769* 343* 334*         Imaging:  All imaging studies reviewed by me.     Current Medications:   calcium carbonate-vitamin D  1 tablet Per J Tube TID w/meals    cyanocobalamin  500 mcg Per Feeding Tube Daily    dapsone  50 mg Per J Tube Q Mon Wed Fri AM    heparin ANTICOAGULANT  5,000 Units Subcutaneous Q12H    lactated ringers  250 mL Intravenous Once    levalbuterol  1.25 mg Nebulization 2 times daily    lidocaine  1 patch Transdermal Q24h    lipids 4 oil  250 mL Intravenous Once per day on Monday Wednesday Friday Saturday    [Held by provider] metoprolol  25 mg Per J Tube BID    pantoprazole  40 mg Per J Tube BID    predniSONE  5 mg Per J Tube QAM    And    predniSONE  2.5 mg Per J Tube  QPM    [Held by provider] sevelamer carbonate (RENVELA)  0.8 g Oral BID    sodium bicarbonate  650 mg Oral 4x Daily    tacrolimus  4 mg Per J Tube QAM    And    tacrolimus  4.5 mg Per J Tube QPM      dextrose      heparin (porcine) 500 Units/hr (02/24/24 0839)    - MEDICATION INSTRUCTIONS -      - MEDICATION INSTRUCTIONS -      parenteral nutrition - ADULT compounded formula CYCLE      parenteral nutrition - ADULT compounded formula CYCLE Stopped (02/28/24 0900)    - MEDICATION INSTRUCTIONS -      - MEDICATION INSTRUCTIONS -      sodium chloride 0.9%       RABIA Moctezuma

## 2024-02-28 NOTE — PROGRESS NOTES
"BP (!) 81/48   Pulse 79   Temp 98.6  F (37  C) (Axillary)   Resp 21   Ht 1.57 m (5' 1.81\")   Wt 47.1 kg (103 lb 13.4 oz)   SpO2 99%   BMI 19.11 kg/m      VBG and ABG continue to worsen. Lethargic and intermittently confused but answers yes/no questions appropriately. Able to tolerate bipap for stretches of some time but will take mask off every 20 or 30 minutes and desat to 80%. SpO2 95-97% on 35%fiO2 bipap. Complains of headache, denies other pain. Seen by pulmonlogy and transplant, pulm suggested possible nasal trumpet to improve bipap efficacy. Head MRI still ordered and checklist needs ot be done but scan is low priority per medicine team. Due to worsening hypercapnia, mental and respiratory status, transferred to MICU at 1100. Personal belongings sent with her: smartphone, purse, glasses, walker.   "

## 2024-02-28 NOTE — H&P
MEDICAL ICU H&P  02/28/2024    Date of Hospital Admission: 2/10/24  Date of ICU Admission: 2/28/24  Reason for Critical Care Admission: Worsening respiratory acidosis and hypercarbia   Date of Service (when I saw the patient): 02/28/2024    ASSESSMENT: Sofie Rodriguez is a 63 year old female with PMH COPD s/p bilateral lung transplant 6/28/22 c/b right hemidiaphragm palsy and recurrent pneumonias, gastroparesis and small bowel dysmotility complicated by severe malnutrition now s/p PEG/J, ESRD on T/Th/Sat HD, recent R femoral fx s/p ORIF, chronic diarrhea, recurrent c-diff, FTT with inability to tolerate any tube feeds, who was admitted to [facility] on 2/10/24 for concerns over malnutrition and TPN initiation via portacath. On 2/17/24 she was transferred to ICU for worsening mental status and acute hypoxic and hypercarbic respiratory failure inspite of BiPAP requiring intubation. She was suspected to have PNA and started on antibiotics. Extubated on 2/19 without complications, now on RA, tolerated iHD on 2/20, and tolerating PO regular diet. Re-admitted ICU 2/28/24 for gradually worsening respiratory acidosis and acute on chronic hypercarbia with encephalopathy despite BiPAP.      CHANGES and MAJOR THINGS TODAY:   - Follow VBGs  - LR bolus 250 ml x1   - Nutrition following  - NPO > on continuous BiPAP AVAPs settings below  - MAP goal greater than 60   - Start theophylline 300 mg/day   - Obtain Sputum cx as able, repeat blood cx   - Hold PO bicarb tab  - Hold PO metoprolol   - Replacing Mg & K        PLAN:    Neuro:  # Pain and sedation  # Mild acute encephalopathy s/t worsening hypercarbia   Patient has become progressively lethargic on 2/17 requiring admission to [facility]. Etiology at the time was s/t hypercarbia. LFTs and ammonia wnl with low suspicion of hepatic encephalopathy. BUN wnl with low suspicion for uremic encephalopathy. Head CT on 2/18 was negative with low suspicion of acute intracranial  pathology. Patient is awake, alert, oriented and following commands since 2/20. Patient with worsening encephalopathy since 2/28 and worsening hypercarbia, however upon exam she is A&O x3-4, with NAD.   - Continue BiPAP  - Avoid overly sedating medications    Pulmonary:  # Respiratory acidosis   # Acute on chronic hypercarbic respiratory failure   # Recurrent pneumonia   Most recent admission to ICU on 2/17 d/t hypercarbic respiratory failure and was intubated at the time and later extubated 2/19 w/out complications. At the time thought to be related to hypercarbia, pulmonary edema d/t ESRD on dialysis vs. Infection from immunocompromised state given history of lung transplant. Was transferred back to the floor and has slowly increasing pCO2 despite trailing HFNC and BiPAP. Of note patient frequently denies wearing BiPAP d/t claustrophobia. VBGs show pH around 7.02 - 7.12, with CO2  and compensation of bicarb at 38. Etiology include refusal of wearing BiPAP, possibly pulmonary edema in the setting of ESRD on dialysis vs. Infection, although WBC stable 7.1, and has been afebrile and no growth on recent micro, although procal 2/26 at 0.94 >1.56. Could consider r/t respiratory drive, as well as and in the setting of significant malnutrition.  Discussed the possibility of intubation with the patient and she was okay with it as long as it was temporary.   - Chest Xray 2/28: Continued appearance of bilateral lung transplant slight improvement of edema. Continued left pleural effusion. Continued mild  pulmonary artery enlargement suggesting pulmonary hypertension. Atherosclerosis.  - CT Chest 2/26: Dependent predominant groundglass and consolidative opacities with diffuse bilateral interlobular septal thickening, favored to represent  pulmonary edema. There are persistent but decreased superimposed patchy/nodular opacities, favored to represent an improving infectious/inflammatory process. Small left and trace right  and loculate pleural effusions.   - Co2 normally around 70-90s  - Continue BiPAP: Currently on AVAPs (holding off on intubation for now)    > Spo2 goal >88% use minimum oxygen to maintain saturation to avoid blunting any hypoxic ventilatory drive   > Settings: /Min 10/Max25/Backup RR 14/EPAP reduced to 4   > Follow VBGs   - Avoid respiratory depressants as able   - Continue with nutrition support as below   - Hold off on dialysis today > most recent 2/27 with 3L removed > will evaluate tomorrow   - Start theophylline 300 mg/day for diaphragmatic strength in patients with COPD   - Hold PO bicarb tab > this can assist in CO2 conversion   - Neurology consulted per primary team to evaluate for decreased central respiratory drive    > Ultimately advised obtaining an MRI brain with and without contrast to assess for brainstem lesions   - Palliative care consult being considered: GOC discussions; appeared this was discussed with TP team and to be done once patient more mentally clear regarding trach and moving forward   - ABX deferred for now, revisit pending clinical course and infectious work up     # S/P lung transplant 2022 c/b right hemidiaphragm palsy  - Transplant pulmonology following    > Repeat immuknow assay 2/27 pending    > Repeat prospera in one month     Immunosuppression:   > Prednisone 5 mg q day, 2.5 mg afternoon   > Tacrolimus 4.5 mg   PPX:   >Dapsone MWF for PJP     Cardiovascular:  #Hypotension   #A-fib, resolved  No issues with Afib, currently SR. Most recent TTE on 2/18 with EF 55-60%, otherwise within normal limits. Upon arrival to ICU patient MAPs low 60s, she did receive her morning metoprolol otherwise has not had prior issues with hemodynamics.   - MAP goal >60   - 250 LR bolus x1    > Frugal with volume replacement given ESRD on Dialysis   - Obtain lactate, procal and ionized calcium       GI/Nutrition:  # Severe malnutrition (see RD note for details)   # FTT   # Gastroparesis, small bowel  dysmotility  # S/P PEG/J with intolerance of enteral nutrition   # GERD  # Chronic osmotic diarrhea/SIBO s/p Rifaximin > improving   Patient with gastroparesis (presumed due to vagal injury) and small bowel dysmotility complicated by unintentional 40lb weight loss over the past year and now severe malnutrition. Previously intolerant to oral food intake due to nausea. Was initially admitted for portacath and TPN initiation since 2/13. After extubation has been able to tolerate feeds without n/v from 2/20. Per GI, next steps for workup for malnutrition would include CT enterography to evaluate for anatomic abnormalities contributing to malnutrition vs possible treatment for SIBO (though patient has had multiple courses of treatment in the past with no long term improvement).   - NPO in setting of BiPAP   - Nutrition following    > Holding off on resuming tube feeds (had been stopped as patient was on regular diet with >300 kcal intake)                > Nephrology: limit fluid < 1.5 L per day for TPN (chart vol of TPN in the I/O)               > RD concerned pt is not absorbing any oral intake and they will be monitoring Bowel function, I/O and, PO/TF/TPN intake               > TPN to be restarted tonight   - CT enterography with contrast- Pt not able to tolerate oral contrast load of 1500 ml on 2/21; no ongoing concerns for SIBO, would need small bowel motility study if not improving outpatient through [place]  - Daily Weights  - Monitor CMP in the AM   - Continue PPI   - Awaiting metabolic cart study       Renal/Fluids/Electrolytes:  # ESRD on HD   # Hypokalemia   # Hypomagnesemia   Normal HD schedule T/TH/Sat. Last run 2/27 with 3L removed.   - Nephrology following   - Hold off on HD run today, re-evaluate tomorrow  - Replace w/ mg sulfate 1g   - Replace potassium chloride oral solution 20 mEq once   - Small fluid bolus as above   - Hold PO bicarb as could be ultimately converting to CO2, and compensation associated  with hypercarbia     #Steal physiology of LUE dialysis access fistula  LUE arterial US obtained 2/21 with concern for LUE edema. US of fistula demonstrated steal physiology. Discussed with nephrology, and vascular surgery consulted on 2/22. Vascular surgery has only minor concerns for steal symptoms and given that the AVF is working well, they are okay with outpatient fistulograms/ venoplasty with wrist brachial index and PPG's, unless new concerns arise.  - plan for outpatient workup as above; nephrology in agreement with outpatient workup.    Endocrine:  # Hypoglycemia  # Elevated TSH and low T4  Most recent A1c <4.2. Blood sugars around 70 upon admission to ICU. Patient with new low T4 at 0.64 and TSH at 6.5, concerning for new hypothyroidism vs sick thyroid syndrome. TSH with appropriate response, more consistent with elevation in the setting of acute illness, levothyroxine is not indicated at this time, will monitor for symptom development. Consider repeat testing in outpatient setting once patient no longer acutely ill.    - Hypoglycemic protocol   - d10 infusion for hypoglycemia   - TPN to be started tonight   - Monitor glucose   - Repeat TSH, t4 and t3     ID:  # Recurrent pneumonia   # Immunosuppression s/t bilateral lung transplant   # Hx of EBV viremia   # Hypogammaglobulinemia  Chest xray and recent CT chest c/f pulmonary edema vs. infection although CT chest with known GGO and superimposed patch/nodular opacities showing slight improvement. WBC stable, afebrile and not on pressors, although procal elevated 1.56, lactate 0.6. Most recent micro blood cx, fungal, histo, cryptococcus negative. BAL from 2/18 with candida but likely colonization but otherwise NGTD. Cannot rule out ongoing infection at this time.   - Low threshold to restart abx if clinically decompensates   - Monitor fever curve and wbcs  - Repeat Blood cx, Sputum cx   - IGG 2/26: 655   - Received immune globulin  2/27    Antibiotics:  Vancomycin (2/17 - 2/17)  Zosyn (2/17 - 2/23)  Dapsone MWF, PJP ppx   Micafungin (2/18- 2/21)    Hematology:    # Acute on chronic Anemia 2/2 ESRD   # LUE unilateral edema   Hgb have been stable 7-8s, with no acute signs of bleeding. LUE US negative for DVT 2/18.   - Receives Venofer 50 mcg q week (Tuesday)   - Epogen dose 8000 units     Musculoskeletal:  # Right hip fracture s/p ORIF (December 2023)   - PT/OT    Skin:  # Facial and neck bruising   Reports soreness in her neck from lying in bed. No soreness in the buttocks/ back. Patient has multiple bruises over her arms and face which is attributed to previous falls.   - Continue to monitor     General Cares/Prophylaxis:    DVT Prophylaxis: Heparin SQ  GI Prophylaxis: PPI  Restraints: N/A  Family Communication: Updated Daughter Julia on telephone. Call Daughter Charity first   Code Status: Full Code (patient aware to have conversation > is okay with intubation as long as it's temporary and okay for chest compressions)     Lines/tubes/drains:  - CVC RIJ (for TPN, is tunneled line)   - PIV x2  - PEG/J  - HD AV fistula, left arm  - Access difficult     Disposition:  - Medical ICU     Patient seen and findings/plan discussed with medical ICU staff, Dr. Stone.     Kalpana Merino, APRN CNP      Clinically Significant Risk Factors              # Hypoalbuminemia: Lowest albumin = 2.6 g/dL at 2/18/2024  5:13 AM, will monitor as appropriate             # Severe Malnutrition: based on nutrition assessment    # Financial/Environmental Concerns: none            Total patient care time includes 45 minutes.     MICU STAFF CRITICAL CARE PROGRESS NOTE:    I saw and examined the patient with the MICU team and concur with findings and A&P as detailed in Ms. Merino's above note of today.  See my independent note of today    Miguel Angel Stone MD  MICU Staff  [id number]    -----------------------------------------------------------------------    HISTORY PRESENTING  ILLNESS:     Sofie Rodriguez is a 63 year old female with PMH COPD s/p bilateral lung transplant 6/28/22 c/b hemidiaphragm palsy and recurrent pneumonias, gastroparesis and small bowel dysmotility complicated by severe malnutrition now s/p PEG/J, ESRD on T/Th/Sat HD, recent R femoral fx s/p ORIF, chronic diarrhea, recurrent c-diff, FTT with inability to tolerate any tube feeds, who was admitted to [facility] on 2/10/24 for concerns over malnutrition and TPN initiation via portacath. On 2/17/24 she was transferred to ICU for worsening mental status and acute hypoxic and hypercarbic respiratory failure inspite of BiPAP requiring intubation. She was suspected to have PNA and started on antibiotics. Extubated on 2/19 without complications, now on RA, tolerated iHD on 2/20, and tolerating PO regular diet. Re-admitted ICU 2/28/24 for worsening respiratory acidosis and acute on chronic hypercarbia with encephalopathy despite BiPAP.    Upon admission to MICU patient answering questions appropriately, slightly lethargic but arousable. Hemodynamics stable, although mildly soft Bps with MAP around 60 since initiation of BiPAP. CO2 levels baseline around 70-80's, has been consistently pH 7.02-7.12 with CO2 90-100s on BiPAP. Workup thus far with imaging question ongoing pulmonary edema, vs infection/inflammatory. Otherwise Lactate normal, wbcs stable and patient remains afebrile although procal elevated 0.94 > 1.56. Additional workup,  question hypercarbia s/t to advanced deconditioning and malnutrition, pulmonary edema/fluid less likely but could be contributing with ESRD and dialysis, and frequently refusing BiPAP. Patient otherwise denied chest pain, dyspnea, shortness of breath, sputum production with mild cough, n/v or diarrhea.     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    Hepatitis 2017    Hep C, [facility]    History of blood transfusion     HTN (hypertension)     Infectious mononucleosis     Lung infection 11/30/2022    Osteopenia      SURGICAL HISTORY:  Past Surgical History:   Procedure Laterality Date    BRONCHOSCOPY (RIGID OR FLEXIBLE), DIAGNOSTIC N/A 08/02/2022    Procedure: BRONCHOSCOPY, DIAGNOSTIC- inspection Bronch;  Surgeon: Kamala Lovell MD;  Location: [facility]    BRONCHOSCOPY (RIGID OR FLEXIBLE), DIAGNOSTIC N/A 09/13/2022    Procedure: INSPECTION BRONCHOSCOPY, WITH BRONCHOALVEOLAR LAVAGE;  Surgeon: Jose R Mccullough MD;  Location: [facility]    BRONCHOSCOPY (RIGID OR FLEXIBLE), DIAGNOSTIC N/A 11/09/2022    Procedure: BRONCHOSCOPY, WITH BRONCHOALVEOLAR LAVAGE AND BIOPSY;  Surgeon: Cesar Lima MD;  Location: [facility]    BRONCHOSCOPY (RIGID OR FLEXIBLE), DIAGNOSTIC N/A 01/25/2023    Procedure: BRONCHOSCOPY, WITH BRONCHOALVEOLAR LAVAGE AND BIOPSY;  Surgeon: Mason Reddy MD;  Location: [facility]    BRONCHOSCOPY (RIGID OR FLEXIBLE), DIAGNOSTIC N/A 04/19/2023    Procedure: BRONCHOSCOPY, WITH BRONCHOALVEOLAR LAVAGE AND BIOPSY;  Surgeon: Kamala Lovell MD;  [facility]: [facility]    BRONCHOSCOPY (RIGID OR FLEXIBLE), DIAGNOSTIC N/A 07/12/2023    Procedure: BRONCHOSCOPY, WITH BRONCHOALVEOLAR LAVAGE AND BIOPSY;  Surgeon: Cesar Lima MD;  Location: [facility]    BRONCHOSCOPY FLEXIBLE AND RIGID N/A 07/19/2022    Procedure: BRONCHOSCOPY inspection only;  Surgeon: Bob Liao MD;  [facility]    COLONOSCOPY  2015    CORONARY ANGIOGRAPHY ADULT ORDER      CV CORONARY ANGIOGRAM N/A 06/30/2021    Procedure: CV CORONARY ANGIOGRAM;  Surgeon: Alexander Cuellar MD;  Location:  HEART CARDIAC CATH LAB    CV RIGHT HEART CATH MEASUREMENTS RECORDED N/A 06/30/2021    Procedure: CV RIGHT HEART CATH;  Surgeon: Alexander Cuellar MD;  Location:   HEART CARDIAC CATH LAB    ENDOSCOPIC PERORAL MYOTOMY N/A 10/09/2023    Procedure: MYOTOMY, ESOPHAGUS, ENDOSCOPIC, ORAL APPROACH;  Surgeon: Gonzalez Navarro MD;  [facility]    ENDOSCOPIC RETROGRADE CHOLANGIOPANCREATOGRAM N/A 08/11/2022    Procedure: ENDOSCOPIC RETROGRADE CHOLANGIOPANCREATOGRAPHY WITH PANCREATIC DUCT NEEDLE KNIFE AND STENT PLACEMENT, BILE DUCT SPHINCTEROTOMY, BLOOD/DEBRIS REMOVAL AND STENT PLACEMENT;  Surgeon: Cosmo Arroyo MD;  [facility]: [facility]    ENDOSCOPIC RETROGRADE CHOLANGIOPANCREATOGRAM N/A 10/07/2022    Procedure: ENDOSCOPIC RETROGRADE CHOLANGIOPANCREATOGRAPHY with biliary and pancreatic stent removal, debris removal;  Surgeon: Cosmo Arroyo MD;  Location:  OR    ENT SURGERY  1974    tonsillectomy    ENTEROSCOPY SMALL BOWEL N/A 08/11/2022    Procedure: SMALL BOWEL ENTEROSCOPY;  Surgeon: Cosmo Arroyo MD;  [facility]    ESOPHAGOGASTRODUODENOSCOPY, WITH NASOGASTRIC TUBE INSERTION N/A 07/01/2022    Procedure: ESOPHAGOGASTRODUODENOSCOPY, WITH NASOJEJUNAL TUBE INSERTION;  Surgeon: Ozzy Nickerson MD;  Location:  GI    ESOPHAGOSCOPY, GASTROSCOPY, DUODENOSCOPY (EGD), COMBINED N/A 08/03/2022    Procedure: ESOPHAGOGASTRODUODENOSCOPY (EGD);  Surgeon: Ira Andres MD;  Location:  GI    ESOPHAGOSCOPY, GASTROSCOPY, DUODENOSCOPY (EGD), COMBINED N/A 01/25/2023    Procedure: ESOPHAGOGASTRODUODENOSCOPY (EGD) with botox injection;  Surgeon: Gonzalez Navarro MD;  Location:  GI    ESOPHAGOSCOPY, GASTROSCOPY, DUODENOSCOPY (EGD), COMBINED N/A 10/09/2023    Procedure: ESOPHAGOGASTRODUODENOSCOPY;  Surgeon: Gonzalez Navarro MD;  Location:  OR    HAND SURGERY      INSERT CHEST TUBE Right 09/13/2022    Procedure: Insert chest tube;  Surgeon: Jose R Mccullough MD;  Location:  GI    IR CVC TUNNEL PLACEMENT > 5 YRS OF AGE  09/26/2022    IR CVC TUNNEL PLACEMENT > 5 YRS OF AGE  2/14/2024    IR GASTRO JEJUNOSTOMY TUBE CHANGE  08/31/2022    IR GASTRO  JEJUNOSTOMY TUBE CHANGE  12/21/2022    IR GASTRO JEJUNOSTOMY TUBE CHANGE  07/12/2023    IR GASTRO JEJUNOSTOMY TUBE CHANGE  08/18/2023    IR GASTRO JEJUNOSTOMY TUBE CHANGE  11/14/2023    IR GASTRO JEJUNOSTOMY TUBE PLACEMENT  07/27/2022    IR THORACENTESIS  08/29/2022    LEEP TX, CERVICAL  04/07/2017    HECTOR III    LYMPH NODE BIOPSY Left 2005    Left axilla, benign- [place]    MIDLINE INSERTION - DOUBLE LUMEN Left 07/28/2022    20cm, Basilic vein    REPLACE GASTROJEJUNOSTOMY TUBE, PERCUTANEOUS  10/07/2022    Procedure: Replace Gastrojejunostomy Tube;  Surgeon: Cosmo Arroyo MD;  [facility]: [facility]    THORACENTESIS Left 08/29/2022    Procedure: THORACENTESIS;  Surgeon: Bo Capone PA-C;  Location: [facility] OR    THORACENTESIS Left 09/13/2022    Procedure: Thoracentesis;  Surgeon: Jose R Mccullough MD;  Location: [facility]    THROMBECTOMY UPPER EXTREMITY Left 07/02/2022    Procedure: LEFT RADIAL ARM THROMBECTOMY;  Surgeon: Christie Graham MD;  [facility]: [facility]    TRANSPLANT LUNG RECIPIENT SINGLE X2 Bilateral 06/28/2022    Procedure: Clamshell Incision, Bilateral Sequential Lung Transplant, On Cardiopulmonary Bypass, Flexible Bronchoscopy;  Surgeon: Sue Sunshine MD;  Location: [facility]     SOCIAL HISTORY:  Social History     Socioeconomic History    Marital status:    Tobacco Use    Smoking status: Former     Packs/day: 0.50     Years: 30.00     Additional pack years: 0.00     Total pack years: 15.00     Types: Cigarettes     Quit date: 11/11/2020     Years since quitting: 3.2    Smokeless tobacco: Never   Substance and Sexual Activity    Alcohol use: Not Currently     Comment: Stopped drinking in 2020    Drug use: Not Currently     Types: Marijuana, Methamphetamines     Comment: hx:marijuana and methamphetamine-quit both unsure ?  2-3 yrs ago     Social Determinants of Health     Interpersonal Safety: Not At Risk (9/1/2023)    Humiliation, Afraid, Rape, and Kick questionnaire     Fear of  Current or Ex-Partner: No     Emotionally Abused: No     Physically Abused: No     Sexually Abused: No     FAMILY HISTORY:   No family history on file.  ALLERGIES:   Allergies   Allergen Reactions    Blood Transfusion Related (Informational Only) Other (See Comments)     Patient has a history of a clinically significant antibody against RBC antigens.  A delay in compatible RBCs may occur.     No Clinical Screening - See Comments Other (See Comments)     Patient has a history of a clinically significant antibody against RBC antigens.  A delay in compatible RBCs may occur.    Azithromycin Rash     12/1/22: Developed a rash that is not raised and looks diffuse in nature. It started in the groin and up the back and has now worked its way up her chest into her face. Pt states that it has now started to itch. She is breathing and talking normally and denies any airway changes. Unclear what started rash. Pt noted feeling somewhat itchy yesterday.    Ganciclovir Rash     12/1/22: Developed a rash that is not raised and looks diffuse in nature. It started in the groin and up the back and has now worked its way up her chest into her face. Pt states that it has now started to itch. She is breathing and talking normally and denies any airway changes. Unclear what started rash. Pt noted feeling somewhat itchy yesterday.     MEDICATIONS:  No current facility-administered medications on file prior to encounter.  acetaminophen (TYLENOL) 500 MG tablet, 500-1,000 mg by Per J Tube route 3 times daily as needed for mild pain  B Complex-C-Folic Acid (DIALYVITE) TABS, 1 tablet by Per J Tube route every morning  Calcium Carbonate-Vitamin D 600-10 MG-MCG TABS, 1 tablet by Per J Tube route 3 times daily  dapsone 2 mg/mL SUSP, 25 mLs (50 mg) by Per J Tube route Every Mon, Wed, Fri Morning  furosemide (LASIX) 40 MG tablet, 40 mg by Per J Tube route 2 times daily  loperamide (IMODIUM A-D) 2 MG tablet, 1 tablet (2 mg) by Per J Tube route 4  times daily as needed for diarrhea  metoprolol tartrate (LOPRESSOR) 50 MG tablet, 0.5 tablets (25 mg) by Per Feeding Tube route 2 times daily  multivitamin (CENTRUM SILVER) tablet, Take 1 tablet by mouth daily  pantoprazole (PROTONIX) 2 mg/mL SUSP suspension, 20 mLs (40 mg) by Per J Tube route 2 times daily  predniSONE (DELTASONE) 5 MG tablet, Take 1 tablet (5 mg) by mouth every morning AND 0.5 tablets (2.5 mg) every evening. Take 1 tab (5mg) at AM and 1/2 tab (2.5) in pm (Patient taking differently: Take 1 tablet (5 mg) by J-tube every morning AND 0.5 tablets (2.5 mg) every evening. Take 1 tab (5mg) at AM and 1/2 tab (2.5) in pm)  protein modular (PROSOURCE TF) LIQD, 1 packet by Per Feeding Tube route daily (Patient taking differently: 1 packet by Per Feeding Tube route daily at 2 pm)  sevelamer carbonate, RENVELA, 0.8 GM PACK Packet, Take 1 packet (0.8 g) by mouth 3 times daily (with meals) (Patient taking differently: 0.8 g by Per J Tube route 3 times daily (with meals))  tacrolimus (GENERIC) 1 mg/mL suspension, Take 4 mLs (4 mg) by mouth every morning AND 4 mLs (4 mg) every evening. (Patient taking differently: Take 4 mLs (4 mg) by j-tube every morning AND 4 mLs (4 mg) every evening.)  vitamin B-12 (CYANOCOBALAMIN) 500 MCG tablet, 1 tablet (500 mcg) by Per Feeding Tube route daily  [DISCONTINUED] albuterol (PROAIR HFA/PROVENTIL HFA/VENTOLIN HFA) 108 (90 BASE) MCG/ACT Inhaler, Inhale 2 puffs into the lungs every 6 hours as needed for shortness of breath / dyspnea or wheezing  [DISCONTINUED] insulin glargine (LANTUS PEN) 100 UNIT/ML pen, Inject 7 Units Subcutaneous 2 times daily  [DISCONTINUED] ipratropium - albuterol 0.5 mg/2.5 mg/3 mL (DUONEB) 0.5-2.5 (3) MG/3ML neb solution, Inhale 1 vial into the lungs every 4 hours as needed for shortness of breath / dyspnea  [DISCONTINUED] mycophenolate (GENERIC EQUIVALENT) 200 MG/ML suspension, 3.75 mLs (750 mg) by Per J Tube route 2 times daily  [DISCONTINUED]  valGANciclovir (VALCYTE) 50 MG/ML solution, 9 mLs (450 mg) by Oral or Feeding Tube route three times a week (Patient taking differently: 450 mg by Oral or Feeding Tube route three times a week Take on Mon/Wed/Fri)      PHYSICAL EXAMINATION:  Temp:  [96.1  F (35.6  C)-99  F (37.2  C)] 97.8  F (36.6  C)  Pulse:  [] 96  Resp:  [14-27] 20  BP: ()/(47-64) 120/58  Cuff Mean (mmHg):  [77] 77  FiO2 (%):  [30 %] 30 %  SpO2:  [90 %-99 %] 99 %  General: NAD, slightly lethargic but answering questions appropriately.   HEENT: Normocephalic, atraumatic   Neuro: A&Ox3, NAD, no notable focal deficits, some intermittent confusion otherwise neurologically intact   Pulm/Resp: Clear and diminished breath sounds bilaterally without rhonchi, crackles or wheeze, breathing non-labored on BiPAP   CV: RRR, s1/s2, no m/r/p appreciated. Mild peripheral edema   Abdomen: Soft, non-distended, non-tender to palpation. Hypoactive bowel sounds   : anuric   Incisions/Skin: RUE/Neck bruising. No other rashes, or notable wounds     LABS: Reviewed.   Arterial Blood Gases   No lab results found in last 7 days.  Complete Blood Count   Recent Labs   Lab 02/28/24  0558 02/27/24  0756 02/27/24  0556 02/26/24  0611   WBC 7.1 8.0 7.9 6.9   HGB 8.2* 7.9* 7.8* 8.6*    283 280 288     Basic Metabolic Panel  Recent Labs   Lab 02/28/24  0558 02/27/24  0556 02/26/24  0611 02/25/24  0548    137 139 135   POTASSIUM 3.4 4.1 4.2 4.0   CHLORIDE 98 103 103 101   CO2 32* 23 26 26   BUN 50.7* 70.2* 53.2* 35.0*   CR 2.60* 4.42* 3.55* 2.36*   * 152* 159* 124*     Liver Function Tests  Recent Labs   Lab 02/28/24  0558 02/27/24  0556 02/26/24  0611 02/25/24  0548   AST 17 21 19 25   ALT 10 16 13 12   ALKPHOS 81 88 78 66   BILITOTAL 0.2 <0.2 <0.2 0.2   ALBUMIN 3.4* 3.3* 3.3* 3.1*   INR  --   --  0.97  --      Coagulation Profile  Recent Labs   Lab 02/26/24  0611   INR 0.97       IMAGING:  Recent Results (from the past 24 hour(s))   XR Chest Port  1 View    Narrative    Portable chest    INDICATION: Persistent respiratory failure    COMPARISON: CT 2/26/2024. Portable plain film also 2/26/2024    FINDINGS: Heart upper normal size. Atherosclerotic calcification at  the aortic knob. Clamshell sternotomy from prior bilateral lung  transplant again noted. Mildly prominent pulmonary artery convexity of  the left upper mediastinal border suggesting pulmonary artery  enlargement which is also evident on the recent CT, this could  indicate pulmonary hypertension as well. A right sided single-lumen  central venous catheter tip is in the right atrium. Left costophrenic  angle blunting is unchanged. Scattered patchy opacities in the lungs  appears slightly improved bilaterally. No ectopic air.      Impression    IMPRESSION: Continued appearance of bilateral lung transplant slight  improvement of edema. Continued left pleural effusion. Continued mild  pulmonary artery enlargement suggesting pulmonary hypertension.  Atherosclerosis.    ALVINA HARRY MD         SYSTEM ID:  [id number]

## 2024-02-28 NOTE — PROGRESS NOTES
Pulmonary Medicine  Cystic Fibrosis - Lung Transplant Team  Progress Note  2024     Patient: Sofie Rodriguez  MRN: [id number]  : 1960 (age 63 year old)  Transplant: 2022 (Lung), POD#[id number]  Admission date: 2/10/2024    Assessment & Plan:     Sofie Rodriguez is a 63 year old female with h/o COPD s/p BSLT () with course complicated by post-operative hemidiaphragm palsy, recurrent PNAs, positive DSA, EBV viremia, hypogammaglobulinemia, severe gastroparesis s/p G/J tube placement (22) and pyloric botox (23), GI bleed 2/2 pyloric ulcer, hemobilia s/p ERCP and MRCP, chronic diarrhea, recurrent C diff colitis, ESRD on iHD, recurrent falls with injuries (recent right hip fx s/p ORIF 2023), deconditioning, and FTT.  Admitted 2/10 for initiation of TPN/lipids.  Transferred to ICU  for emergent intubation for hypoxic and hypercapneic respiratory failure with severe respiratory acidosis and encephalopathy.  iHD increased, infectious workup unremarkable.  Extubated .  Continues with persistent and progressive hypercapnia with severe acidosis, returning to ICU today d/t tenuous respiratory status.     Today's recommendations:  - Volume management per nephrology  - AVAPS with  ml, management per MICU (minimize interruptions to therapy as much as pt. will tolerate)  - MRI brain per neurology when able  - Repeat ImmuKnow pending  - Tacrolimus level today presumed subtherapeutic, dose increased, repeat level ordered 3/2  - Repeat DSA due 3/6  - Repeat Prospera and EBV due 3/18  - TF remains on hold, consider resuming post-pyloric feeds with declining pulmonary status (even if only at trickle rate)  - TPN/lipids per primary  - CT enterography recommended per GI, but unlikely to be able to tolerate the required 1.5 L enteral contrast bolus so deferring for now     Acute on chronic hypoxic/hypercapneic respiratory failure:  S/p bilateral sequential lung transplant for COPD:  Right  hemidiaphragm palsy:   Hypoventilation, Suspected CARLEE: CT PTA (2/7) with decreased MARLON opacities but new tree in bud RLL opacities.  PFTs unchanged in clinic (but ATS criteria not met), remain significantly below her baseline.  Baseline hypoxia with 2L NC overnight.  Respiratory decompensation with encephalopathy and severe respiratory acidosis on 2/17.  S/p intubation 2/17-2/19.  Repeat CT (2/17) with new patchy consolidative and nodular opacities, GGO primarily in the bases and intralobular septal thickening (concerning for pulmonary edema given recent addition of TPN nutrition, new infection, PTLD, and/or septic emboli.  Bronch with lavage of RML (2/18) with very friable tissue, cutlures only with C. glabrata.  S/p empiric Zosyn (2/17-2/24) and micafungin (2/18-2/21).  Severe respiratory acidosis with hypercapnia persisting with slight improvement with NIPPV treatment, limited by pt. tolerance to pressure and mask (claustrophobia).  Persistent respiratory failure and variable encephalopathy also complicated by diaphragmatic weakness, hypoventilation, and deconditioning d/t malnutrition.  Repeat CT (2/26) with diffuse GG and consolidative opacities >BLL and diffuse interlobular septal thickening.  Intermittent tolerance of NIPPV persists, reports some improvement in comfort with transition from BiPAP to AVAPS this morning but pH still ~7.1 with pCO2 ~100 on VBG.  Neurology consulted 2/27.  Procal increased to 1.56 (2/28).  Transferring to ICU today given tenuous respiratory status and potential need for reintubation.  May need to consider trach placement, though this will severely limit pt's disposition options given need for iHD.    - Histoplasma Ag (2/26) pending (CrAg negative)  - Volume management per nephrology  - Xopenex BID (2/27)  - Defer ABX at this time  - AVAPS with  ml, management per MICU (minimize interruptions to therapy as much as pt. will tolerate)  - MRI brain per neurology when able  -  Sleep clinic eval indicated as OP     Immunosuppression:  ImmuKnow low at 108 on 1/10.  AZA previously stopped 5/2023 d/t low ImmuKnow assay and EBV viremia.  - Repeat ImmuKnow (2/27) pending  - Tacrolimus 4 mg qAM / 4.5 mg qPM.  Goal level 6-8 (decreased 2/26 d/t ImmuKnow and concern for infection).  Level today 6.2 (11h), dose increased to 4.5 mg BID.  Repeat level ordered 3/2.  - Prednisone 5 mg qAM / 2.5 mg qPM     Prophylaxis:   - Dapsone 50 mg q MWF for PJP ppx  - CMV ppx not currently indicated, D+/R+, CMV negative 2/19 (BAL very mildly positive 2/18, likely not clinically significant)     Positive DSA: PFTs and pulmonary symptoms have remained stable as OP, so AMR treatment deferred given frailty.  Most recent DSA (2/7) with DQB2 mfi 6966 (from 5723 one month prior).  Most recent cell-free DNA Prospera (2/18) mildly elevated at 1.04 (concerning for possible rejection), which was increased from prior level of 0.12 (1/10).  IST increase deferred at that time per Dr. Gong.  S/p IVIG (2/27) for DSA (IgG WNL).  - Repeat DSA due 3/6  - Repeat Prospera due 3/18      EBV viremia: CT CAP (2/7) without lymphadenopathy.  Most recent level (2/18) improved to 28k from 96k (2/7)  - Repeat EBV due 3/18    Other relevant problems being managed by the primary team:      FTT:  Severe protein calorie malnutrition:  Gastroparesis s/p PEG/J, botox, and G-POEM:  SB hypomotility:  Pyloric ulcer:  Chronic nausea and osmotic diarrhea:  SIBO s/p rifaximin:   Recurrent C diff colitis: Chronic osmotic and infectious diarrhea since transplant with recurrent episodes of C diff.  Notable weight loss (40# in a year) d/t diarrhea, GI dysmotility, and intolerance of enteral feeds (PEG/J tube in place), most recently on elemental formula.  Extensive OP eval and f/u with GI.  S/p port placement for TPN and lipids.  Tolerating modest PO intake, monitoring for refeeding syndrome.    - TF remains on hold, consider resuming post-pyloric feeds  "with declining pulmonary status (even if only at trickle rate)  - TPN/lipids per primary  - CT enterography recommended per GI, but unlikely to be able to tolerate the required 1.5 L enteral contrast bolus so deferring for now    ESRD: CT scan (with declining respiratory status) with volume overload secondary to TPN volume, iHD increased from PTA TThSa schedule with unsustained improvement.  Management per nephrology     We appreciate the excellent care provided by the [place] [id number] and MICU teams.  Recommendations communicated via this note.  Will continue to follow along closely, please do not hesitate to call with any questions or concerns.     Patient discussed with Dr. Gong.    Catherine Johnson, DNP, APRN, CNP  Inpatient Nurse Practitioner  Pulmonary CF/Transplant     Subjective & Interval History:     Tolerated 3 hours of BiPAP yesterday with slight improvement in VBG, then again 5 hours overnight.  Removed per pt. d/t claustrophobia and anxiety.  Occasional congested cough.  HD run yesterday for 3L removed.  Intermittently confused with mild encephalopathy.      Review of Systems:     C: No fever, no chills  INTEGUMENTARY/SKIN: No new rash or obvious new lesions  ENT/MOUTH: No nasal congestion or drainage  RESP: See interval history  CV: No chest pain, no palpitations, no peripheral edema, no orthopnea  GI: No nausea, no vomiting, no change in stools, no reflux symptoms  : + Oliguria  MUSCULOSKELETAL: + Right hip/shoulder pain  ENDOCRINE: Blood sugars intermittently elevated  NEURO: + Headache  PSYCHIATRIC: See interval history    Physical Exam:     All notes, images, and labs from past 24 hours (at minimum) were reviewed.    Vital signs:  Temp: 98.6  F (37  C) Temp src: Axillary BP: 97/63 Pulse: 85   Resp: 19 SpO2: 99 % O2 Device: BiPAP/CPAP Oxygen Delivery: (S) 35 LPM (found on pt.) Height: 157 cm (5' 1.81\") Weight: 50.5 kg (111 lb 5.3 oz)  I/O:   Intake/Output Summary (Last 24 hours) at 2/28/2024 " 1316  Last data filed at 2/28/2024 1300  Gross per 24 hour   Intake 1797.62 ml   Output --   Net 1797.62 ml     Constitutional: Lying in bed, in no apparent distress.   HEENT: Eyes with pink conjunctivae, anicteric.  Oral mucosa moist without lesions.   PULM: Diminished bases bilaterally.  Faint scattered crackles, no rhonchi, no wheezes.  Non-labored breathing on AVAPS  ml.  CV: Normal S1 and S2.  RRR.  Faint systolic murmur.  No gallop or rub.  No peripheral edema.   ABD: NABS, soft, nontender, nondistended.  PEG/J tube site cdi.  MSK: Moves all extremities.  .   NEURO: Slightly lethargic, conversant.   SKIN: Warm, dry, fragile.  Scattered ecchymosis.   PSYCH: Mood stable.     Data:     LABS    CMP:   Recent Labs   Lab 02/28/24  1147 02/28/24  1101 02/28/24  0558 02/27/24  1622 02/27/24  0556 02/26/24  1930 02/26/24  0611 02/25/24  1553 02/25/24  0548   NA  --   --  139  --  137  --  139  --  135   POTASSIUM  --   --  3.4  --  4.1  --  4.2  --  4.0   CHLORIDE  --   --  98  --  103  --  103  --  101   CO2  --   --  32*  --  23  --  26  --  26   ANIONGAP  --   --  9  --  11  --  10  --  8   GLC 70 76 279*  --  152*  --  159*  --  124*   BUN  --   --  50.7*  --  70.2*  --  53.2*  --  35.0*   CR  --   --  2.60*  --  4.42*  --  3.55*  --  2.36*   GFRESTIMATED  --   --  20*  --  11*  --  14*  --  23*   ESTUARDO  --   --  8.5*  --  9.3  --  9.0  --  8.7*   MAG  --   --  1.9 1.9 2.5* 2.4* 2.2   < > 1.8   PHOS  --   --  4.3 2.6 5.2* 5.3* 4.9*   < > 3.9   PROTTOTAL  --   --  6.0*  --  5.6*  --  5.4*  --  5.3*   ALBUMIN  --   --  3.4*  --  3.3*  --  3.3*  --  3.1*   BILITOTAL  --   --  0.2  --  <0.2  --  <0.2  --  0.2   ALKPHOS  --   --  81  --  88  --  78  --  66   AST  --   --  17  --  21  --  19  --  25   ALT  --   --  10  --  16  --  13  --  12    < > = values in this interval not displayed.     CBC:   Recent Labs   Lab 02/28/24  0558 02/27/24  0756 02/27/24  0556 02/26/24  0611   WBC 7.1 8.0 7.9 6.9   RBC 2.14* 2.14*  "2.12* 2.27*   HGB 8.2* 7.9* 7.8* 8.6*   HCT 28.1* 27.4* 27.6* 29.4*   * 128* 130* 130*   MCH 38.3* 36.9* 36.8* 37.9*   MCHC 29.2* 28.8* 28.3* 29.3*   RDW 20.6* 20.6* 20.9* 21.0*    283 280 288       INR:   Recent Labs   Lab 02/26/24  0611   INR 0.97       Glucose:   Recent Labs   Lab 02/28/24  1147 02/28/24  1101 02/28/24  0558 02/27/24  0556 02/26/24  0611 02/25/24  0548   GLC 70 76 279* 152* 159* 124*       Blood Gas:   Recent Labs   Lab 02/28/24  1243 02/28/24  0925 02/28/24  0920 02/28/24  0558   PHV 7.17* 7.19*  --  7.12*   PCO2V 99* 90*  --  108*   PO2V 51* 53*  --  54*   HCO3V 36* 34*  --  35*   LINDY 6.2* 5.8*  --  3.9*   O2PER 35 35 35 0       Culture Data No results for input(s): \"CULT\" in the last 168 hours.    Virology Data:   Lab Results   Component Value Date    FLUAH1 Not Detected 02/18/2024    FLUAH3 Not Detected 02/18/2024    [id number] Not Detected 02/18/2024    IFLUB Not Detected 02/18/2024    RSVA Not Detected 02/18/2024    RSVB Not Detected 02/18/2024    PIV1 Not Detected 02/18/2024    PIV2 Not Detected 02/18/2024    PIV3 Not Detected 02/18/2024    HMPV Not Detected 02/18/2024       Historical CMV results (last 3 of prior testing):  Lab Results   Component Value Date    CMVQNT Not Detected 02/19/2024    CMVQNT Not Detected 02/07/2024    CMVQNT Not Detected 01/10/2024     Lab Results   Component Value Date    CMVLOG 3.2 07/12/2023    CMVLOG <2.1 04/19/2023    CMVLOG 3.5 01/25/2023       Urine Studies    Recent Labs   Lab Test 02/18/24  0222 05/18/23  0627   URINEPH 7.5* 5.0   NITRITE Negative Negative   LEUKEST Trace* Moderate*   WBCU 66* 21*       Most Recent [place]ze Pulmonary Function Testing (FVC/FEV1 only)  FVC-Pre   Date Value Ref Range Status   02/07/2024 1.19 L    01/10/2024 1.12 L    08/29/2023 1.48 L    07/25/2023 1.55 L      FVC-%Pred-Pre   Date Value Ref Range Status   02/07/2024 42 %    01/10/2024 39 %    08/29/2023 53 %    07/25/2023 55 %      FEV1-Pre   Date Value Ref Range " Status   02/07/2024 1.13 L    01/10/2024 1.10 L    08/29/2023 1.43 L    07/25/2023 1.54 L      FEV1-%Pred-Pre   Date Value Ref Range Status   02/07/2024 51 %    01/10/2024 49 %    08/29/2023 64 %    07/25/2023 69 %        IMAGING    Recent Results (from the past 48 hour(s))   CT Chest w/o Contrast    Narrative    EXAM: CT CHEST W/O CONTRAST 2/26/2024 9:36 PM    HISTORY: 63 years Female worsening respiratory acidosis, eval for  pneumonia vs pulmonary edema    COMPARISON: Chest radiograph from earlier the same day, CT chest  2/17/2024.    TECHNIQUE: Helical CT imaging of the chest was obtained without  contrast. Multiplanar post-processed and MIP reformats were obtained  reviewed. .    FINDINGS:      LINES/TUBES: Tunneled right internal jugular central venous catheter  tip terminates in the right atrium..      MEDIASTINUM: Postsurgical changes of bilateral lung transplantation.  No suspicious lymphadenopathy. Normal size/configuration of the great  vessels. Atherosclerotic calcifications of the aortic arch. Enlarged  heart. No pericardial effusion. Coronary artery calcifications.    LUNG: Patent tracheobronchial tree without intraluminal mass.  Thickening of the pleural fissures. Small loculated left trace right  pleural effusions. Bilateral interlobular septal thickening, greatest  within the lung bases. Dependent predominant groundglass opacities  with areas of dependent consolidation. Decreased scattered patchy and  nodular opacities compared to 2/17/2024. Pulmonary vascular  congestion..    PLEURA: No pneumothorax or significant pleural effusion..      LOWER NECK: Thyroid unremarkable.    UPPER ABDOMEN: Limited evaluation due to lack of contrast. Esophagus  appears unremarkable.    BONES: No acute osseous abnormality. No suspicious bony lesions.  Clamshell sternotomy.    SOFT TISSUES: Anasarca.        Impression    IMPRESSION:   1.  Dependent predominant groundglass and consolidative opacities with  diffuse  bilateral interlobular septal thickening, favored to represent  pulmonary edema. There are persistent but decreased superimposed  patchy/nodular opacities, favored to represent an improving  infectious/inflammatory process.  2.  Small left and trace right and loculated pleural effusions.  3.  Cardiomegaly.    I have personally reviewed the examination and initial interpretation  and I agree with the findings.    CECI REGAN DO         SYSTEM ID:  [id number]   XR Chest Port 1 View    Narrative    Portable chest    INDICATION: Persistent respiratory failure    COMPARISON: CT 2/26/2024. Portable plain film also 2/26/2024    FINDINGS: Heart upper normal size. Atherosclerotic calcification at  the aortic knob. Clamshell sternotomy from prior bilateral lung  transplant again noted. Mildly prominent pulmonary artery convexity of  the left upper mediastinal border suggesting pulmonary artery  enlargement which is also evident on the recent CT, this could  indicate pulmonary hypertension as well. A right sided single-lumen  central venous catheter tip is in the right atrium. Left costophrenic  angle blunting is unchanged. Scattered patchy opacities in the lungs  appears slightly improved bilaterally. No ectopic air.      Impression    IMPRESSION: Continued appearance of bilateral lung transplant slight  improvement of edema. Continued left pleural effusion. Continued mild  pulmonary artery enlargement suggesting pulmonary hypertension.  Atherosclerosis.    ALVINA HARRY MD         SYSTEM ID:  [id number]

## 2024-02-28 NOTE — PROGRESS NOTES
"/47   Pulse 103   Temp 97.9  F (36.6  C) (Axillary)   Resp 20   Ht 1.57 m (5' 1.81\")   Wt 47.1 kg (103 lb 13.4 oz)   SpO2 96%   BMI 19.11 kg/m      VBGs continue to worsen. Back from HD at 1200 and more confused than last few days. Denies pain, nausea or shortness of breath. SpO2 drop to 77% on room air. Now amenable to wearing bipap. Bipap on for 3 hours now since 1445. PCO2 at 1615 is 96. Bed alarm on. Head MRI ordered. NPO. IVIG infusing.   "

## 2024-02-28 NOTE — PROGRESS NOTES
Admitted/transferred from: 7C at 1100  Reason for admission/transfer: hypercarbic resp failure, poor VBGs  Patient status upon admission/transfer: VSS, BiPAP 35% FiO2 AVAPs, CVC single lumen, 1 PIV, no gtts running, pt lethargic/slightly confused  Plan: trend VBG, 250 LR fluid bolus for soft pressures, D10 for hypoglycemia  2 RN skin assessment: completed by Claribel DAS and Primitivo BAGLEY  Sexual Orientation and Gender Identity (SOGI) smartfom completed: Done  Result of skin assessment and interventions/actions: significant bruising to R sided face/neck, scattered bruising throughout body, L AV fistula, blanchable redness on sacrum, scab on shin  Height, weight, drug calc weight: Done  Patient belongings (see Flowsheet - Adult Profile for details): walker , 2 belongings bags  MDRO education (if applicable): done

## 2024-02-28 NOTE — PROGRESS NOTES
Medicine Progress Note      [facility]  Medicine Service, MAROON TEAM 1    Date of Hospital Admission: 2/10/2024  Date of ICU Admission: 2/18/2024  Date of Transfer to Medicine Floor: 2/20/2024  Date of Service (when I saw the patient): 02/28/2024      Assessment & Plan  Sofie Rodriguez is a 63 year old female with PMH COPD s/p bilateral lung transplant 6/28/22 c/b hemidiaphragm palsy and recurrent pneumonias, gastroparesis and small bowel dysmotility complicated by severe malnutrition now s/p PEG/J, ESRD on T/Th/Sat HD, recent R femoral fx s/p ORIF, chronic diarrhea, recurrent c-diff, FTT with inability to tolerate any tube feeds, who was admitted to [facility] on 2/10/24 for concerns over malnutrition and TPN initiation via portacath. On 2/17/24 she was transferred to ICU for worsening mental status and acute hypoxic and hypercarbic respiratory failure inspite of BiPAP requiring intubation. She was suspected to have PNA and started on antibiotics. Extubated on 2/19 without complications, now on RA, tolerated iHD on 2/20, and tolerating PO regular diet. Becoming more increasingly confused and lethargic this AM, not tolerating BiPAP. Transferred to ICU.    Changes today:   - await metabolic cart study  - VBG this AM indicated persistent hypercapnia and acidosis on BiPAP  - increasingly confused and lethargic - transferred to ICU  - appreciate lung transplant team's involvement  - Neurology Consulted > had ordered MRI  - was previously planning for daily dialysis for UF and volume management     #Severe malnutrition   #FTT   #Gastroparesis, small bowel dysmotility  #S/P PEG/J with intolerance of enteral nutrition   Patient with gastroparesis (presumed due to vagal injury) and small bowel dysmotility complicated by unintentional 40lb weight loss over the past year and now severe malnutrition. Previously intolerant to oral food intake due to nausea. Was initially  admitted for portacath and TPN initiation since 2/13. After extubation has been able to tolerate feeds without n/v from 2/20. Electrolytes trending towards baseline today.  Per GI, next steps for workup for malnutrition would include CT enterography to evaluate for anatomic abnormalities contributing to malnutrition vs possible treatment for SIBO (though patient has had multiple courses of treatment in the past with no long term improvement). Patient couldn't tolerate the oral load for CT enterography on 2/21, so will defer this until she is able to tolerate greater PO load. On 2/23 , again discussed with patient the need of small bowel motility study if not improving outpatient through [facility]. No ongoing concerns for SIBO on 2/23 as patient is passing multiple episodes of stools and gas with no abdominal pain or distention. C-diff test negative on 2/21. Per RD, patient tolerating >300 kcal of intake PO currently, so stopped trickle Tube feeds and continuing with PO and TPN for nutrition.   - Regular diet + increased TPN +  stopping feeds TF  per RD & transplant pul discussion               - Nephrology: limit fluid < 1.5 L per day for TPN ( chart vol of TPN in the I/O)   - RD concerned pt is not absorbing any oral intake and they will be monitoring Bowel function, I/O and, PO/TF/TPN intake.   -  stopped tube feeds for now with >300 kcal in PO intake, and monitor per discussion with transplant pulm, continue with TPN for majority of nutrition needs  - Continue calorie count  - CT enterography with contrast- Pt not able to tolerate oral contrast load of 1500 ml on 2/21; no ongoing concerns for SIBO, would need small bowel motility study if not improving outpatient through [facility]  - Daily Weights  - monitor CMP in the AM      #Severe respiratory acidosis, worsening  #Acute hypoxic and hypercarbic respiratory failure  #S/P Lung transplant 2022  #Recurrent pneumonia   Patient received a bilateral lung transplant 6/28/22 for  COPD. Was admitted 2/10 to address malnutrition   and admitted to ICU on 2/17 with hypoxic hypercarbic respiratory failure. Intubated on 2/18 AM  due to worsening oxygen requirements, likely due to pulmonary edema given hx of ESRD requiring HD vs infection given hx of lung transplant, immunosuppressed status, and recurrent pneumonias. Extubated on 2/19 without complications,   Micro Follow-up on BAL, viral panel, CMV, fungus, and Blood Cultures show no abnormalities. Does have slowly rising pCO2 on VBG - HFNC did not seem to improve elevated pCO2. Awaiting metabolic CART study to evaluate rising pCO2. pH worsened to 7.02 on 2/27/24 and pCO2 elevated to 104; patient was more tired in AM, however became more confused and disoriented in PM, and was okay with trial of BiPAP one hour on, one hour off to manage respiratory acidosis. Also discussed with neurology, as patient has not had appropriate physiologic response to worsening respiratory acidosis (I.e. no respiratory compensation/changes while fully awake and alert for respiratory acidosis) and consulted neurology to evaluate if there is concern or need for further workup for central  of respiratory acidosis given acute worsening and change. Patient increasingly hypercapnic and acidotic this AM on BiPAP, and more lethargic.  Discussed with ICU admitting team who ccepted for transfer to ICU.  - Transfer to ICU for intubation  - discussed with transplant pulmonary team - once mentally clear, would like to evaluate GOC  - neurology consulted to assist with evaluation for possible central etiology of hypercapnic respiratory failure - MRI ordered  - continue PO sodium bicarb 650 mg QID to stabilize pH   - PRN hypertonic saline inhaler with albuterol nebs  - Continue good pulm toilet  - Transplant pulmonology following, recs appreciated  - PTA immunosuppressive agent  >Prednisone 5 mg every morning, 2.5 mg every afternoon; tacrolimus 4 mg every morning, 4mg every  afternoon  > Monitor tacro levels, goal 7-9  > - overnight oximetry study suggestive of O2 vs CPAP need, as did require up to 2LPM overnight to prevent hypoxia  > Try to minimize O2 to preserve respiratory drive, will give IVIG for DSA+   - avoid HFNC, maintain minimum oxygen to keep SaO2 >85% - pending recs from transplant pulm  - PTA dapsone MWF for PJP prophylaxis  - completed course of zosyn for empiric HAP course (2/17 - 2-23)  - Initial decompensation likely from opioids - limit medications that would depress respiration   - port culture per transplant ID rec- [id number]  - awaiting metabolic CART study  - consider GOC discussions later this week and involve palliative care tomorrow (2/28) given chronic retention of CO2 that patient does not want to treat with CPAP/BiPAP when at her mental baseline, and given concern that TPN (which is her main source of nutrition) is causing hypervolemia that may require daily dialysis to manage volume, and may also be contributing to her respiratory acidosis      Antibiotics:    Vancomycin (2/17 - 2/17)  Zosyn (2/17 - 2/23)  Dapsone MWF, PJP ppx   Micafungin (2/18- 2/21)    Immunosuppression  Tacrolimus  Prednisone     #Steal physiology of LUE dialysis access fistula  LUE arterial US obtained 2/21 with concern for LUE edema. US of fistula demonstrated steal physiology. Discussed with nephrology, and vascular surgery consulted on 2/22. Vascular surgery has only minor concerns for steal symptoms and given that the AVF is working well, they are okay with outpatient fistulograms/ venoplasty with wrist brachial index and PPG's, unless new concerns arise.  - plan for outpatient workup as above; nephrology in agreement with outpatient workup.    #Hypoalbuminemia  #Left upper extremity unilateral edema, improving   #Bilateral pedal and ankle edema, improving  Edema due to third spacing due to hypoalbuminemia vs HF. BNP >01557 at admission, Echo on 2/18 shows EF of 55-60% with IVC of <2.1  and collapsing >50% with sniff, normal RA pressure, no significant valvular abnormalities ;  Left upper extremity swelling and  pitting lower extremity edema likely due to hypoalbuminemia improved today. Upper limb duplex USG on 2/18 negative for DVT. Wt went from 43.4 kg on admission to 47.4 kg today. She is urinating but cannot chart as she usually urinates with BM. She reports trending to baseline with urination. She denies dysuria, retention symptoms. USG left arm on 2/21 shows steel physiology and Vascular Surgery consulted. Vascular surgery has only minor concerns for steal symptoms and given that the AVF is working well, they are okay with outpatient fistulograms/ venoplasty with wrist brachial index and PPG's, unless new concerns arise. LUE and LE edema significantly decreased since yesterdays HD. No new tingling/ numbness noticed.  - Continue daily weights  - Strict I/Os  - Elevation and wrapping of only lower legs; no wrapping of Left upper extremity with dialysis fistula    #ESRD on HD T/Th/Sat  #Acute on chronic Anemia 2/2 ESRD  #Hypervolemic hyponatremia  #Anion gap metabolic acidosis - resolved  Patient is ESRD on T/Th/Sat HD, Hgb stabilizing. HD tolerating well. Continuing monitoring electrolytes daily. Anion gap normal since 2/21. Will continue to monitor daily labs/ABG  for refeeding syndrome and acidosis  - Continue Venofer injections    - CBC and CMP daily  - Continue epogen dose 8000 units as per nephrology  - Strict I/Os  - HD T/TH/Sat per nephrology - plan to potentially start daily dialysis    #Facial and neck bruising  #Pain  Reports soreness in her neck from lying in bed. No soreness in the buttocks/ back. Patient has multiple bruises over her arms and face which is attributed to previous falls. No new bruising reported today. Patients reports her headaches are improving with tylenol. Using heating pads and lidocaine patch for body pains. Couple of small skin tears noted by the Rn's. Skin  care done accordingly.  - Tylenol Q4  - Lidocaine patch prn  - Heating pads prn  - Diligent skin cares    #HTN  #A-fib, resolved  Noted atrial fibrillation on arrival with HR elevated at 150, not sustained for > 10 minutes and otherwise hemodynamically stable. Rates stable in the 80's. BP normalizing since 2/22.  - PTA metoprolol 25mg BID   - Continuous telemetry    # Encephalopathy secondary to hypercarbia - resolved  Patient was progressively more lethargic on 2/17 during admission in [facility]. Etiology likely secondary to hypercarbia in context of respiratory failure as patient was noted to have high CO2s concomitant with lethargy. Though receiving oxycodone and fentanyl, lethargy was only partially alleviated by narcan. LFTs and ammonia wnl with low suspicion of hepatic encephalopathy. BUN wnl with low suspicion for uremic encephalopathy. Head CT on 2/18 was negative with low suspicion of acute intracranial pathology. Patient is awake, alert, oriented and following commands since 2/20.     # Right hip fracture s/p ORIF (December 2023)   - PT/OT    # GERD  - PTA PPI    # Low T4  Patient with new low T4 at 0.64 and TSH at 6.5, concerning for new hypothyroidism vs sick thyroid syndrome. TSH with appropriate response, more consistent with elevation in the setting of acute illness, levothyroxine is not indicated at this time, will monitor for symptom development. Consider repeat testing in outpatient setting once patient no longer acutely ill.     # Hx EBV viremia   # Hypogammaglobulinemia  # Chronic immunosuppression  Patient has been afebrile and has not had leukocytosis however she is under immunosuppression. Given acute respiratory failure and hx of recurrent pneumonias, she was started on empiric abx for concerns over new pneumonia. RVP and cultures no growth to date. Last day of empirical treatment for HAP.  - EBV 27K (decreased compared to prior)       Lines/tubes/drains:  - CVC RIJ (for TPN, is  "tunneled line)   - PIV x2  - PEG/J  - HD AV fistula, left arm        Diet:   Regular diet PO  Holding trickle tube feeds if persistent PO intake >300 kcal/day  Majority of nutrition through TPN per RD    General Cares/Prophylaxis:    DVT Prophylaxis: Heparin SQ  GI Prophylaxis: PPI  Fluids: None  Code Status: Full    Clinically Significant Risk Factors              # Hypoalbuminemia: Lowest albumin = 2.6 g/dL at 2/18/2024  5:13 AM, will monitor as appropriate             # Severe Malnutrition: based on nutrition assessment    # Financial/Environmental Concerns: none              Disposition Plan:    Expected Discharge Date: 3/01/24  Destination: TCU/ home with help/services  Discharge Comments:           The patient's care was discussed with the Attending Physician, Dr. Delacruz.     Julia Gilliland MD  Internal Medicine Resident, PGY-1  Medicine Service, [address]  Securely message with [employer] (more info)  Text page via [employer] Paging/Directory   See signed in provider for up to date coverage information  _________________     Interval History  Nursing notes reviewed. Patient becoming increasingly confused this AM.  Unable to stay awake to answer questions.  VBG with increasing hypercapnia and acidotic pH.  Discussed with lung transplant who was concerned that she would need to be intubated.  Patient becoming increasingly lethargic, and unable to answer questions. Denied pain, but noted increasing work of breathing and chest pain. Concerns of leaking BiPAP.Discussed with ICU team who graciously admitted her for transfer to the ICU.    Physical Exam  BP (!) 81/48   Pulse 79   Temp 98.6  F (37  C) (Axillary)   Resp 21   Ht 1.57 m (5' 1.81\")   Wt 47.1 kg (103 lb 13.4 oz)   SpO2 99%   BMI 19.11 kg/m      General: thin, alert and oriented, laying in bed with BiPAP on, unable to answer many questions  HEENT: Normocephalic, bruising on the right face around " right orbit with hematoma and right neck.  Neuro: unable to answer many, falling asleep, becoming more lethargic  Pulm/Resp: breathing on BiPAP, faint crackles bilaterally  CV: RRR, warm extremities, 1+ pitting edema in left hand, no edema in right arm or hand, and 1+ pitting edema from bilateral ankles to feet  Abdomen: Non-distended, PEG in place without erythema or drainage  Incisions/Skin: Bruising to face and right forearm.     Labs and Imaging for this encounter reviewed in chart review.

## 2024-02-29 LAB
ABO/RH(D): NORMAL
ALBUMIN SERPL BCG-MCNC: 3 G/DL (ref 3.5–5.2)
ALP SERPL-CCNC: 64 U/L (ref 40–150)
ALT SERPL W P-5'-P-CCNC: 8 U/L (ref 0–50)
ANION GAP SERPL CALCULATED.3IONS-SCNC: 11 MMOL/L (ref 7–15)
ANTIBODY SCREEN: NEGATIVE
AST SERPL W P-5'-P-CCNC: 13 U/L (ref 0–45)
BASE EXCESS BLDV CALC-SCNC: 3.7 MMOL/L (ref -3–3)
BASE EXCESS BLDV CALC-SCNC: 6 MMOL/L (ref -3–3)
BASE EXCESS BLDV CALC-SCNC: 6.5 MMOL/L (ref -3–3)
BASOPHILS # BLD AUTO: ABNORMAL 10*3/UL
BASOPHILS # BLD MANUAL: 0 10E3/UL (ref 0–0.2)
BASOPHILS NFR BLD AUTO: ABNORMAL %
BASOPHILS NFR BLD MANUAL: 0 %
BILIRUB DIRECT SERPL-MCNC: <0.2 MG/DL (ref 0–0.3)
BILIRUB SERPL-MCNC: 0.2 MG/DL
BLD PROD TYP BPU: NORMAL
BLOOD COMPONENT TYPE: NORMAL
BUN SERPL-MCNC: 70.2 MG/DL (ref 8–23)
CALCIUM SERPL-MCNC: 8.9 MG/DL (ref 8.8–10.2)
CHLORIDE SERPL-SCNC: 98 MMOL/L (ref 98–107)
CODING SYSTEM: NORMAL
CREAT SERPL-MCNC: 3.47 MG/DL (ref 0.51–0.95)
CROSSMATCH: NORMAL
DEPRECATED HCO3 PLAS-SCNC: 30 MMOL/L (ref 22–29)
EGFRCR SERPLBLD CKD-EPI 2021: 14 ML/MIN/1.73M2
EOSINOPHIL # BLD AUTO: ABNORMAL 10*3/UL
EOSINOPHIL # BLD MANUAL: 0 10E3/UL (ref 0–0.7)
EOSINOPHIL NFR BLD AUTO: ABNORMAL %
EOSINOPHIL NFR BLD MANUAL: 0 %
ERYTHROCYTE [DISTWIDTH] IN BLOOD BY AUTOMATED COUNT: 20 % (ref 10–15)
GLUCOSE BLDC GLUCOMTR-MCNC: 118 MG/DL (ref 70–99)
GLUCOSE BLDC GLUCOMTR-MCNC: 134 MG/DL (ref 70–99)
GLUCOSE BLDC GLUCOMTR-MCNC: 191 MG/DL (ref 70–99)
GLUCOSE SERPL-MCNC: 140 MG/DL (ref 70–99)
HCO3 BLDV-SCNC: 32 MMOL/L (ref 21–28)
HCO3 BLDV-SCNC: 34 MMOL/L (ref 21–28)
HCO3 BLDV-SCNC: 34 MMOL/L (ref 21–28)
HCT VFR BLD AUTO: 22.3 % (ref 35–47)
HCT VFR BLD AUTO: 25.4 % (ref 35–47)
HGB BLD-MCNC: 6.6 G/DL (ref 11.7–15.7)
HGB BLD-MCNC: 7.9 G/DL (ref 11.7–15.7)
IMM GRANULOCYTES # BLD: ABNORMAL 10*3/UL
IMM GRANULOCYTES NFR BLD: ABNORMAL %
IMMUKNOW IMMUNE CELL FUNCTION: 88 NG/ML
INR PPP: 1.09 (ref 0.85–1.15)
ISSUE DATE AND TIME: NORMAL
LYMPHOCYTES # BLD AUTO: ABNORMAL 10*3/UL
LYMPHOCYTES # BLD MANUAL: 0.8 10E3/UL (ref 0.8–5.3)
LYMPHOCYTES NFR BLD AUTO: ABNORMAL %
LYMPHOCYTES NFR BLD MANUAL: 13 %
MAGNESIUM SERPL-MCNC: 1.7 MG/DL (ref 1.7–2.3)
MAGNESIUM SERPL-MCNC: 2.2 MG/DL (ref 1.7–2.3)
MCH RBC QN AUTO: 36.9 PG (ref 26.5–33)
MCHC RBC AUTO-ENTMCNC: 29.6 G/DL (ref 31.5–36.5)
MCV RBC AUTO: 125 FL (ref 78–100)
METAMYELOCYTES # BLD MANUAL: 0.1 10E3/UL
METAMYELOCYTES NFR BLD MANUAL: 1 %
MONOCYTES # BLD AUTO: ABNORMAL 10*3/UL
MONOCYTES # BLD MANUAL: 0.3 10E3/UL (ref 0–1.3)
MONOCYTES NFR BLD AUTO: ABNORMAL %
MONOCYTES NFR BLD MANUAL: 5 %
NEUTROPHILS # BLD AUTO: ABNORMAL 10*3/UL
NEUTROPHILS # BLD MANUAL: 4.9 10E3/UL (ref 1.6–8.3)
NEUTROPHILS NFR BLD AUTO: ABNORMAL %
NEUTROPHILS NFR BLD MANUAL: 81 %
NRBC # BLD AUTO: 0.1 10E3/UL
NRBC BLD AUTO-RTO: 1 /100
O2/TOTAL GAS SETTING VFR VENT: 32 %
O2/TOTAL GAS SETTING VFR VENT: 35 %
O2/TOTAL GAS SETTING VFR VENT: 35 %
OXYHGB MFR BLDV: 66 % (ref 70–75)
OXYHGB MFR BLDV: 74 % (ref 70–75)
OXYHGB MFR BLDV: 80 % (ref 70–75)
PCO2 BLDV: 66 MM HG (ref 40–50)
PCO2 BLDV: 71 MM HG (ref 40–50)
PCO2 BLDV: 75 MM HG (ref 40–50)
PH BLDV: 7.24 [PH] (ref 7.32–7.43)
PH BLDV: 7.28 [PH] (ref 7.32–7.43)
PH BLDV: 7.32 [PH] (ref 7.32–7.43)
PHOSPHATE SERPL-MCNC: 2.2 MG/DL (ref 2.5–4.5)
PHOSPHATE SERPL-MCNC: 4.7 MG/DL (ref 2.5–4.5)
PLAT MORPH BLD: ABNORMAL
PLATELET # BLD AUTO: 253 10E3/UL (ref 150–450)
PO2 BLDV: 36 MM HG (ref 25–47)
PO2 BLDV: 44 MM HG (ref 25–47)
PO2 BLDV: 51 MM HG (ref 25–47)
POLYCHROMASIA BLD QL SMEAR: SLIGHT
POTASSIUM SERPL-SCNC: 3.6 MMOL/L (ref 3.4–5.3)
PREALB SERPL-MCNC: 20.4 MG/DL (ref 20–40)
PROT SERPL-MCNC: 5.2 G/DL (ref 6.4–8.3)
RBC # BLD AUTO: 1.79 10E6/UL (ref 3.8–5.2)
RBC MORPH BLD: ABNORMAL
SAO2 % BLDV: 68.1 % (ref 70–75)
SAO2 % BLDV: 76.5 % (ref 70–75)
SAO2 % BLDV: 82.5 % (ref 70–75)
SODIUM SERPL-SCNC: 139 MMOL/L (ref 135–145)
SPECIMEN EXPIRATION DATE: NORMAL
THEOPHYLLINE SERPL-MCNC: 6.8 UG/ML (ref 10–20)
UNIT ABO/RH: NORMAL
UNIT NUMBER: NORMAL
UNIT STATUS: NORMAL
UNIT TYPE ISBT: 5100
WBC # BLD AUTO: 6 10E3/UL (ref 4–11)

## 2024-02-29 PROCEDURE — 250N000012 HC RX MED GY IP 250 OP 636 PS 637: Performed by: NURSE PRACTITIONER

## 2024-02-29 PROCEDURE — 82805 BLOOD GASES W/O2 SATURATION: CPT

## 2024-02-29 PROCEDURE — 84100 ASSAY OF PHOSPHORUS: CPT

## 2024-02-29 PROCEDURE — 250N000012 HC RX MED GY IP 250 OP 636 PS 637

## 2024-02-29 PROCEDURE — 250N000011 HC RX IP 250 OP 636: Mod: JZ

## 2024-02-29 PROCEDURE — 80198 ASSAY OF THEOPHYLLINE: CPT

## 2024-02-29 PROCEDURE — 250N000009 HC RX 250

## 2024-02-29 PROCEDURE — 99292 CRITICAL CARE ADDL 30 MIN: CPT | Mod: FS

## 2024-02-29 PROCEDURE — 94640 AIRWAY INHALATION TREATMENT: CPT | Mod: 76

## 2024-02-29 PROCEDURE — 86900 BLOOD TYPING SEROLOGIC ABO: CPT

## 2024-02-29 PROCEDURE — 250N000013 HC RX MED GY IP 250 OP 250 PS 637

## 2024-02-29 PROCEDURE — 83735 ASSAY OF MAGNESIUM: CPT

## 2024-02-29 PROCEDURE — 999N000157 HC STATISTIC RCP TIME EA 10 MIN

## 2024-02-29 PROCEDURE — 85027 COMPLETE CBC AUTOMATED: CPT

## 2024-02-29 PROCEDURE — 84134 ASSAY OF PREALBUMIN: CPT | Performed by: INTERNAL MEDICINE

## 2024-02-29 PROCEDURE — P9045 ALBUMIN (HUMAN), 5%, 250 ML: HCPCS | Mod: JZ | Performed by: INTERNAL MEDICINE

## 2024-02-29 PROCEDURE — 36415 COLL VENOUS BLD VENIPUNCTURE: CPT

## 2024-02-29 PROCEDURE — 250N000011 HC RX IP 250 OP 636

## 2024-02-29 PROCEDURE — 85610 PROTHROMBIN TIME: CPT | Performed by: INTERNAL MEDICINE

## 2024-02-29 PROCEDURE — 99207 PR NO BILLABLE SERVICE THIS VISIT: CPT | Performed by: NURSE PRACTITIONER

## 2024-02-29 PROCEDURE — 90937 HEMODIALYSIS REPEATED EVAL: CPT

## 2024-02-29 PROCEDURE — 86922 COMPATIBILITY TEST ANTIGLOB: CPT

## 2024-02-29 PROCEDURE — 85007 BL SMEAR W/DIFF WBC COUNT: CPT

## 2024-02-29 PROCEDURE — 99232 SBSQ HOSP IP/OBS MODERATE 35: CPT | Mod: FS | Performed by: PHYSICIAN ASSISTANT

## 2024-02-29 PROCEDURE — 99207 PR NO BILLABLE SERVICE THIS VISIT: CPT | Mod: GC

## 2024-02-29 PROCEDURE — 99232 SBSQ HOSP IP/OBS MODERATE 35: CPT | Mod: GC | Performed by: INTERNAL MEDICINE

## 2024-02-29 PROCEDURE — 99291 CRITICAL CARE FIRST HOUR: CPT | Mod: FS

## 2024-02-29 PROCEDURE — 120N000002 HC R&B MED SURG/OB UMMC

## 2024-02-29 PROCEDURE — 258N000003 HC RX IP 258 OP 636: Performed by: INTERNAL MEDICINE

## 2024-02-29 PROCEDURE — 80053 COMPREHEN METABOLIC PANEL: CPT | Performed by: INTERNAL MEDICINE

## 2024-02-29 PROCEDURE — 250N000009 HC RX 250: Performed by: INTERNAL MEDICINE

## 2024-02-29 PROCEDURE — 85018 HEMOGLOBIN: CPT

## 2024-02-29 PROCEDURE — P9016 RBC LEUKOCYTES REDUCED: HCPCS

## 2024-02-29 PROCEDURE — 99233 SBSQ HOSP IP/OBS HIGH 50: CPT | Mod: FS | Performed by: NURSE PRACTITIONER

## 2024-02-29 PROCEDURE — 250N000011 HC RX IP 250 OP 636: Mod: JZ | Performed by: INTERNAL MEDICINE

## 2024-02-29 PROCEDURE — P9045 ALBUMIN (HUMAN), 5%, 250 ML: HCPCS | Mod: JZ

## 2024-02-29 PROCEDURE — 82248 BILIRUBIN DIRECT: CPT | Performed by: INTERNAL MEDICINE

## 2024-02-29 PROCEDURE — 94660 CPAP INITIATION&MGMT: CPT

## 2024-02-29 PROCEDURE — 94640 AIRWAY INHALATION TREATMENT: CPT

## 2024-02-29 PROCEDURE — 634N000001 HC RX 634: Mod: JZ | Performed by: INTERNAL MEDICINE

## 2024-02-29 RX ORDER — LIOTHYRONINE SODIUM 5 UG/1
2.5 TABLET ORAL 2 TIMES DAILY
Status: DISCONTINUED | OUTPATIENT
Start: 2024-02-29 | End: 2024-04-15 | Stop reason: HOSPADM

## 2024-02-29 RX ORDER — LEVOTHYROXINE SODIUM 25 UG/1
25 TABLET ORAL
Status: DISCONTINUED | OUTPATIENT
Start: 2024-02-29 | End: 2024-04-10

## 2024-02-29 RX ORDER — ALBUMIN (HUMAN) 12.5 G/50ML
50 SOLUTION INTRAVENOUS
Status: COMPLETED | OUTPATIENT
Start: 2024-02-29 | End: 2024-02-29

## 2024-02-29 RX ORDER — HYDROXYZINE HYDROCHLORIDE 25 MG/1
25 TABLET, FILM COATED ORAL ONCE
Status: COMPLETED | OUTPATIENT
Start: 2024-02-29 | End: 2024-02-29

## 2024-02-29 RX ORDER — LORAZEPAM 2 MG/ML
0.5 INJECTION INTRAMUSCULAR
Status: DISCONTINUED | OUTPATIENT
Start: 2024-02-29 | End: 2024-03-11

## 2024-02-29 RX ORDER — LORAZEPAM 0.5 MG/1
0.5 TABLET ORAL
Status: COMPLETED | OUTPATIENT
Start: 2024-02-29 | End: 2024-02-29

## 2024-02-29 RX ADMIN — ONDANSETRON 4 MG: 2 INJECTION INTRAMUSCULAR; INTRAVENOUS at 06:00

## 2024-02-29 RX ADMIN — MAGNESIUM SULFATE HEPTAHYDRATE: 500 INJECTION, SOLUTION INTRAMUSCULAR; INTRAVENOUS at 20:16

## 2024-02-29 RX ADMIN — Medication 2.5 MCG: at 20:07

## 2024-02-29 RX ADMIN — LIDOCAINE 4% 1 PATCH: 40 PATCH TOPICAL at 20:15

## 2024-02-29 RX ADMIN — ALBUMIN HUMAN 250 ML: 0.05 INJECTION, SOLUTION INTRAVENOUS at 13:56

## 2024-02-29 RX ADMIN — Medication 40 MG: at 20:11

## 2024-02-29 RX ADMIN — EPOETIN ALFA-EPBX 8000 UNITS: 10000 INJECTION, SOLUTION INTRAVENOUS; SUBCUTANEOUS at 13:58

## 2024-02-29 RX ADMIN — LEVALBUTEROL HYDROCHLORIDE 1.25 MG: 1.25 SOLUTION RESPIRATORY (INHALATION) at 08:28

## 2024-02-29 RX ADMIN — PREDNISONE 5 MG: 5 TABLET ORAL at 08:18

## 2024-02-29 RX ADMIN — HYDROXYZINE HYDROCHLORIDE 25 MG: 25 TABLET, FILM COATED ORAL at 21:52

## 2024-02-29 RX ADMIN — SODIUM CHLORIDE 300 ML: 9 INJECTION, SOLUTION INTRAVENOUS at 13:57

## 2024-02-29 RX ADMIN — TACROLIMUS 4.5 MG: 5 CAPSULE ORAL at 08:18

## 2024-02-29 RX ADMIN — THEOPHYLLINE 100 MG: 80 SOLUTION ORAL at 06:00

## 2024-02-29 RX ADMIN — LEVALBUTEROL HYDROCHLORIDE 1.25 MG: 1.25 SOLUTION RESPIRATORY (INHALATION) at 21:13

## 2024-02-29 RX ADMIN — CALCIUM CARBONATE 600 MG (1,500 MG)-VITAMIN D3 400 UNIT TABLET 1 TABLET: at 08:18

## 2024-02-29 RX ADMIN — LORAZEPAM 0.5 MG: 0.5 TABLET ORAL at 17:38

## 2024-02-29 RX ADMIN — THEOPHYLLINE 100 MG: 80 SOLUTION ORAL at 13:38

## 2024-02-29 RX ADMIN — CALCIUM CARBONATE 600 MG (1,500 MG)-VITAMIN D3 400 UNIT TABLET 1 TABLET: at 17:38

## 2024-02-29 RX ADMIN — CYANOCOBALAMIN TAB 500 MCG 500 MCG: 500 TAB at 08:18

## 2024-02-29 RX ADMIN — CALCIUM CARBONATE 600 MG (1,500 MG)-VITAMIN D3 400 UNIT TABLET 1 TABLET: at 13:12

## 2024-02-29 RX ADMIN — ALBUMIN HUMAN 250 ML: 0.05 INJECTION, SOLUTION INTRAVENOUS at 15:23

## 2024-02-29 RX ADMIN — THEOPHYLLINE 100 MG: 80 SOLUTION ORAL at 21:52

## 2024-02-29 RX ADMIN — SMOFLIPID 250 ML: 6; 6; 5; 3 INJECTION, EMULSION INTRAVENOUS at 08:20

## 2024-02-29 RX ADMIN — PREDNISONE 2.5 MG: 2.5 TABLET ORAL at 20:06

## 2024-02-29 RX ADMIN — Medication 40 MG: at 08:17

## 2024-02-29 RX ADMIN — ACETAMINOPHEN 975 MG: 325 TABLET, FILM COATED ORAL at 10:36

## 2024-02-29 RX ADMIN — TACROLIMUS 4.5 MG: 5 CAPSULE ORAL at 17:39

## 2024-02-29 RX ADMIN — Medication 2.5 MCG: at 13:39

## 2024-02-29 ASSESSMENT — ACTIVITIES OF DAILY LIVING (ADL)
ADLS_ACUITY_SCORE: 36

## 2024-02-29 NOTE — PROGRESS NOTES
HEMODIALYSIS TREATMENT NOTE    Date: 2/29/2024  Time: 6.00 PM    Data:  Pre Wt: 50.7 kg (111 lb 12.4 oz)   Desired Wt: 3.5  kg   Post Wt: 47.2 kg (Estimated)  Weight change: 3.5 kg  Ultrafiltration - Post Run Net Total Removed (mL): 3500 mL  Vascular Access Status: Graft  patent  Dialyzer Rinse: Streaked, Light  Total Blood Volume Processed: 103.4 L   Total Dialysis (Treatment) Time: 4.00 hours  Dialysate Bath: K 3, Ca 2.25  Heparin: None    Lab:   No    Interventions:  Pt had scheduled 4.0 hours HD through [facility], [place] cream applied 30 minutes earlier before cannulation. 2 15 g needle cannulated successful.  with 600 DFR.  5 % Albumin 250 ml machine prime  & Epogen were given per prescription.  Pt hemodynamic stable throughout the treatment, SBP > 100 & stable.  3.5kg UF pulled, no issues, Crit-line steady with A profile at the end of HD run.  Pt completed her treatment time well, blood rinsed back, Graft hemostasis achieved in less than 10 minutes, clean dressing applied & handoff report given    Assessment:  Pt alert & oriented x4, met on BiPAP, calm & cooperative.  LAVG thrill & bruit present.  Monitoring every 15 minutes & PRN     Plan:    Per Renal Team

## 2024-02-29 NOTE — PROGRESS NOTES
MICU STAFF    During MICU stay we instituted multiple measures to try to increase her ventilatory drive - particularly since her respiratory acidemia/acidosis at transfer to the MICU was predominantly chronic, rather than acute     Discontinued oral HCO3 therapy - this could increase PCO2 levels  2.    Added theophylline therapy - the target serum level should be 10-14          Rationale:  theophylline both increases ventilatory drive and also increases the contractility and resistance to fatigue of the diaphragm and respiratory muscles. This is well established in a series of ~ [facility] papers published by the [facility] group in the 1980s.  3.    Elected to do low dose thyroid hormone replacement in the setting of mildly elevated TSH with low T4 and T3 levels.  There are changing views of the potential adverse impact of the non-thyroidal illness syndrome, with some international thyroid experts ([facility], for example and Bill ref below) believing that there may be benefit - at least in selected patients.  There is clear evidence that hypothyroidism and myxedema reduce both hypoxic and hypercapnic ventilatory drive (Nadine and other references below).  Since the peripheral conversion of T4 to T3 is decreased in many patients with critical illness, I elected to start a combination of both T4 (25 micrograms per day) and T3 (2.5 micrograms BID) with recheck of TFTS (TSH, T4 and T3) in 1 week.    Ventilatory Control in Myxedema and Hypothyroidism  Cesario Mccoy M.D., Miguel Angel Berumen M.D., Eddie Garland M.D., Max Regalado M.D., and John V. Weil, M.D.  March 27, 1975  N Engl J Med 1975; 292:662-665  DOI: 10.1056/[id number]    Impaired ventilatory drive in short-term primary hypothyroidism and its reversal by L-triiodothyronine  YUDI. EVERETT Shelby, JUNIOR Watts V. Contini, L. Turini, M. Tarchi, P. Vitti, M. Bramanti & AMINATA CONTRERAS Endocrinologic  Investigation  https://[email or web address].com/article/[email or web address]    Prediction and reversal of blunted ventilatory responsiveness in patients with hypothyroidism.  Dylan Sauceda E. Ridgway  American Journal of Medicine 1 May 1988  DOI:10.1016/4169-3767(48)57992-6Corpus ID: [id number]    Dangerous Dogmas in Medicine: The Nonthyroidal Illness Syndrome   Nabila NIYAH Younger  Journal of Clinical Endocrinology & Metabolism, Volume 84, Issue 1, 1 January 1999, Pages 151-164, [email or web address]  Published: 01 January 1999    [email or web address]NRMiWQGg8CMNWLCUgCvasoxgXJLEWGB1eOFZPLHrs1YUQ_YiGBy2uQkPVqUDJWKK7-n_Zhl2RbUKa6Fc2j3WMlgcD8ckAMmKWl-MTwHixIL8dAmQEXAWuaXvL-1TvVeoleBjZqX8jDqrVgp7C3Rg_WRSR1HSjrsOsE9PPE8fmdW-Fn3qESHP_XYK8Ywget0GbBmQUQmiOWl2gwCg_PYjtUZaqbNVqNhi_WxVMnFzJTvzREG61BzO2ol7ZWaSE8coRhwvL7IywI0tVtdltRwYngx21V_DkmvJ7_aXVHj2RzTZ5whel2FjQ0GCsQrTv8CGOOuUFEgZ3x6-d9nwyHz1zPyCF3tzeI0ocRNudAph9QNX5rX08jlk7TByXDMSojgnMMhVJNVctAEM5jcuA6Ozz24FEgQ5tx8gsrFljoDdSVWMIMz7j6ZC13pn41KoBvCzDEtDqO227yxKokoLgR9fTocsLcx5KzDzKw6dyUb9HnEgGwVJlkX4OUEtFfeR_Ds1wwGmHoIHQNS6d-Fj48n1kv6undcPc6v1h85uSfWSgQ3qVoq8pKco9YuSW6HkhlKgBgu3_Q63fzWi2vDx9UitA8ZUUBySKD--fy7u7c4taj5FBgLq0R3g8sXecTOZ7Y7VAzSSIf7qUiDQlKRIAsCvV4w8-LmhhVklyo0VHssQSx-j2SAUKTW3POIx7DcNrEtm6XEzhMdmcH0IsoVdgHCK4QF5qjsunM61vSkPSKF08r5sM0_VPUAWTWckbKBpKolkc50JvCuao1jnWaTt52GCdKqEcSA4-GKjhIvEfVnCmM1TZxUM6p8UrSD1zeF6UKRmWPCFspscZLT33zdftZTojHa1nOkFlzv-RtA0ObSthaezLUUhkbRaAfBnBsbAN4q9DH8Um9CR8Jq5oyG4Q1YNDG28zfP0LxIrre4AfvOEqn1T81QX2KoLfL8H8hcTcLlZEqLFTf38ZqH-6PUWAmvthqhviEdu2NsnXPx7UF-VefLvuWkk3Pmfc21Evodyae-ThvVWTeoDFIKmV8J3jafRJZlZDeFy1KpVoLTJO9SkZU1p_yOyFwPK01bcgN4rtn0PDPhU4hhtJiqsj2wFyWWPquxSzamngZP55GYWY    Other treatments NOT instituted include:   Digoxin - increases resp muscle strength  Doxepram - short-term  central CNS stimulant that increases ventilatory drive    Miguel Angel Stone MD

## 2024-02-29 NOTE — PLAN OF CARE
ICU End of Shift Summary. See flowsheets for vital signs and detailed assessment.    Changes this shift: alert, disoirented to time, place, situation intermittently. Denies pain. SR-ST. MAP>60, 250 LR bolus given. Afebrile. BiPAP 35%FiO2 AVAPS, VBG slightly improved this evening. Anuric. Bmx1. NPO, D10 @ 35 to maintain BG.     Plan: HD tmrw? Repeat VBG. Head MRI ordered

## 2024-02-29 NOTE — PROGRESS NOTES
Nephrology Progress Note  2024         Assessment & Recommendations:   Sofie Rodriguez is a 63 year old year old female with ESKD, COPD s/p b/l lung transplant in 2022 with multiple complications, gastroparesis s/p GJ tube, GIB, chronic diarrhea, recurrent c-diff, FTT with inability to tolerate any tube feeds, R hip fx s/p ORIF 2023, admitted on 2/10 for initiation of TPN/lipids, transferred to ICU on  with worsening mental status and respiratory acidosis in spite of bipap, ultimately intubated on , being treated for PNA, also with volume overload in setting of high volume TPN. Persistent respiratory acidosis in spite of volume off, transferred to ICU for hypotension and respiratory failure, thus far on bipap.    # ESKD - TTS, LUE AVG, 3hr, 45.5kg (will be lowered), [facility] Dr[facility] And[facility]. She has been losing weight and now even below her recent set EDW.  - Plan HD today  - Requires EMLA cream an hour before HD       # Dialysis access: LUE AVG placed 3-4 months ago, per pt. She had arm swelling and a fistulogram a couple months ago and swelling improved but now has redeveloped.  - US with c/f possible steal syndrome  - per vascular surgery, no concern for steal syndrome, ok to go ahead with fistulogram  - given that AVF is working well on dialysis (450 BFR) and at this time IR is only able to perform fistulograms when AVF isn't working well, will defer to OP for management.    # Respiratory acidosis/Hypercapnia: VB.28/71/44/34   - intubated  in setting of hypercapnia in spite of bipap, extubated   - transferred to ICU , on bipap so far  - bicarb stopped    # Volume/BP: EDW 45.5 kg. Edema due to third spacing due to hypoalbuminemia. Anuric; on metoprolol soln 25 mg bid   - BP's were stable/hypertensive after dialysis Tu, became hypotensive Wed morning, BP's likely due to ongoing respiratory failure  - volume overload on CXR and on exam with 1+ pitting edema   -  "pre HD wt 50.7 kg today   - I/O not accurate as no tube feeds or TPN/lipids recorded for multiple days  - continue to chart volume of TPN/lipids in the I/O   - appreciate condensing TPN/lipids as much as possible  - net pos 2600 ml yesterday  - UF goal 3.5 kg over 4 hrs today  - CXR 2/28 with slight improvement in pulm edema    #Lytes: K 3.6, Na 139    # Nutrition: on TPN   # FTT  # Acute on chronic diarrhea  - per team, concern is that pt isn't absorbing much PO or tube feeds with diarrhea increasing significantly with increase in tube feeds; thus needing TPN     # Anemia 2/2 ESKD  On Venofer 50 qwk, Mircera last dose 1/9/2024  - hgb 8.2 > 6.6, suspect this is dilutional given 2600 ml fluids yesterday and all cell lines significantly reduced; however pt is being transfused today  - Will continue Venofer 50 mcg q week (Tuesday)  - increase epogen dose from 4000 to 8000 units (starting 2/20)    # BMD : Ca 8-9's, phos 4.7, alb 3.0  - renvela is held, will restart if phos remains elevated           Recommendations were communicated to primary team via note and in person    RABIA Moctezuma   Division of Renal Disease and Hypertension  [phone]    Interval History :   Seen bedside, doing much better today, acidosis improving. Net pos 2600 ml yesterday, will pull 3.5 kg today over 4 hrs. Pt is 5L over EDW, pulm edema mildly improved. No n/v, CP, chills.    Review of Systems:   4 point ROS neg other than as noted above    Physical Exam:   I/O last 3 completed shifts:  In: 1412.25 [I.V.:610.75; NG/GT:315; IV Piggyback:250]  Out: -    /77   Pulse 98   Temp 98.4  F (36.9  C) (Axillary)   Resp 20   Ht 1.57 m (5' 1.81\")   Wt 50.7 kg (111 lb 12.4 oz)   SpO2 94%   BMI 20.57 kg/m       GENERAL APPEARANCE: NAD  PULM: crackles in bases  CV: regular rhythm, normal rate, no rub     -1-2+ pitting edema lower extremities, (LUE (fistula arm) > RUE)  GI: soft   INTEGUMENT: no cyanosis, no rash  NEURO: calm  Access Left " AVG     Labs:   All labs reviewed by me  Electrolytes/Renal -   Recent Labs   Lab Test 02/29/24  1024 02/29/24  0332 02/28/24  2230 02/28/24  1710 02/28/24  1558 02/28/24  1101 02/28/24  0558 02/27/24  1622 02/27/24  0556   NA  --  139  --   --   --   --  139  --  137   POTASSIUM  --  3.6  --   --   --   --  3.4  --  4.1   CHLORIDE  --  98  --   --   --   --  98  --  103   CO2  --  30*  --   --   --   --  32*  --  23   BUN  --  70.2*  --   --   --   --  50.7*  --  70.2*   CR  --  3.47*  --   --   --   --  2.60*  --  4.42*   * 140* 123*   < >  --    < > 279*  --  152*   ESTUARDO  --  8.9  --   --   --   --  8.5*  --  9.3   MAG  --  2.2  --   --  2.0  --  1.9   < > 2.5*   PHOS  --  4.7*  --   --  4.6*  --  4.3   < > 5.2*    < > = values in this interval not displayed.       CBC -   Recent Labs   Lab Test 02/29/24  0556 02/28/24  0558 02/27/24  0756   WBC 6.0 7.1 8.0   HGB 6.6* 8.2* 7.9*    293 283       LFTs -   Recent Labs   Lab Test 02/29/24  0332 02/28/24  0558 02/27/24  0556   ALKPHOS 64 81 88   BILITOTAL 0.2 0.2 <0.2   ALT 8 10 16   AST 13 17 21   PROTTOTAL 5.2* 6.0* 5.6*   ALBUMIN 3.0* 3.4* 3.3*       Iron Panel -   Recent Labs   Lab Test 09/26/22  0555 09/03/22  1039 08/24/22  0810   IRON 54 21* 41   IRONSAT 22 9* 21   CARLOS 769* 343* 334*         Imaging:  All imaging studies reviewed by me.     Current Medications:   albumin human  50 g Intravenous Once in dialysis/CRRT    albumin human  250 mL Intravenous Once in dialysis/CRRT    calcium carbonate-vitamin D  1 tablet Per J Tube TID w/meals    cyanocobalamin  500 mcg Per Feeding Tube Daily    dapsone  50 mg Per J Tube Q Mon Wed Fri AM    epoetin tre-epbx  8,000 Units Intravenous Once in dialysis/CRRT    [Held by provider] heparin ANTICOAGULANT  5,000 Units Subcutaneous Q12H    levalbuterol  1.25 mg Nebulization 2 times daily    levothyroxine  25 mcg Oral QAM AC    lidocaine  1 patch Transdermal Q24h    liothyronine  2.5 mcg Oral BID    lipids 4 oil   250 mL Intravenous Once per day on Sunday Tuesday Thursday Saturday    [Held by provider] metoprolol  25 mg Per J Tube BID    - MEDICATION INSTRUCTIONS -   Does not apply Once    pantoprazole  40 mg Per J Tube BID    predniSONE  5 mg Per J Tube QAM    And    predniSONE  2.5 mg Per J Tube QPM    [Held by provider] sevelamer carbonate (RENVELA)  0.8 g Oral BID    sodium chloride 0.9%  300 mL Hemodialysis Machine Once    tacrolimus  4.5 mg Per J Tube QAM    And    tacrolimus  4.5 mg Per J Tube QPM    theophylline  100 mg Oral or Feeding Tube Q8H TONY      dextrose      dextrose Stopped (02/28/24 2207)    heparin (porcine) 500 Units/hr (02/24/24 0839)    - MEDICATION INSTRUCTIONS -      - MEDICATION INSTRUCTIONS -      parenteral nutrition - ADULT compounded formula 30 mL/hr at 02/29/24 1000    - MEDICATION INSTRUCTIONS -      - MEDICATION INSTRUCTIONS -      sodium chloride 0.9%      - MEDICATION INSTRUCTIONS -       RABIA Moctezuma

## 2024-02-29 NOTE — PROGRESS NOTES
MEDICAL ICU PROGRESS NOTE  02/29/2024      Date of Service (when I saw the patient): 02/29/2024    ASSESSMENT: Sofie Rodriguez is a 63 year old female with PMH COPD s/p bilateral lung transplant 6/28/22 c/b right hemidiaphragm palsy and recurrent pneumonias, gastroparesis and small bowel dysmotility complicated by severe malnutrition now s/p PEG/J, ESRD on T/Th/Sat HD, recent R femoral fx s/p ORIF, chronic diarrhea, recurrent c-diff, FTT with inability to tolerate any tube feeds, who was admitted to [facility] on 2/10/24 for concerns over malnutrition and TPN initiation via portacath. On 2/17/24 she was transferred to ICU for worsening mental status and acute hypoxic and hypercarbic respiratory failure inspite of BiPAP requiring intubation. She was suspected to have PNA and started on antibiotics. Extubated on 2/19 without complication and transferred out of ICU. Re-admitted ICU 2/28/24 for gradually worsening respiratory acidosis and acute on chronic hypercarbia with intermittently use of BiPAP, now improving and transferring out of ICU 2/29.     CHANGES and MAJOR THINGS TODAY:   - 1U PRBC  - Continue BiPAP okay for intermittent breaks to RA or NC   - HD run today   - Restarted full liquid diet   - Started levothyroxine 25mcg daily with combination therapy 2.5 mcg of liothyronine   > Recheck TSH, T4, T3 in one week (ordered)   - Awaiting metabolic cart   - Held subQ heparin this am for drop in hgb   - Transfer out of ICU   - Ordered theophylline level       PLAN:    Neuro:  # Pain and sedation  # Mild acute encephalopathy s/t worsening hypercarbia > Improved   Patient has become progressively lethargic on 2/17 requiring admission to [facility]. Etiology at the time was s/t hypercarbia. Head CT on 2/18 was negative with low suspicion of acute intracranial pathology. Patient with worsening encephalopathy and lethargy since 2/28 and worsening hypercarbia. Improved 2/29, A&Ox4.   - Continue BiPAP with  intermittent breaks   - Avoid overly sedating medications      Pulmonary:  # Respiratory acidosis > improving   # Acute on chronic hypercarbic respiratory failure > improving    Most recent admission to ICU on 2/17 d/t hypercarbic respiratory failure and was intubated at the time and later extubated 2/19 w/out complications. At the time thought to be related to hypercarbia, pulmonary edema d/t ESRD on dialysis vs. Infection from immunocompromised state given history of lung transplant. Was transferred back to the floor and has slowly increasing pCO2 up to  and pH as low as 7.02-7.12. Etiology include refusal of wearing BiPAP, possibly pulmonary edema in the setting of ESRD on dialysis vs. Infection, although WBC stable 7.1, and has been afebrile and no growth on recent micro. Could consider r/t respiratory drive, as well as and in the setting of significant malnutrition.    - Co2 normally around 70-90s  - Continue BiPAP with intermittent breaks up to 30 minutes to room air or nasal cannula               > Spo2 goal >90% use minimum oxygen to maintain saturation to avoid blunting any hypoxic ventilatory drive               > AVAPS settings: /Min 10/Max25/Backup RR 14/EPAP reduced to 4 (reducing EPAP helped increase TV)                > Follow VBGs   - Avoid respiratory depressants as able   - Continue with nutritional support as below   - Received loading dose of theophylline 2/28  - Continue theophylline 300 mg/day for diaphragmatic strength in patients with COPD    > Ordered theophylline level   - Continue to hold PO bicarb tab > this can assist in CO2 conversion   - Neurology consulted per primary team to evaluate for decreased central respiratory drive                > Ultimately advised obtaining an MRI brain with and without contrast to assess for brainstem lesions   - ABX deferred for now, revisit pending clinical course and infectious work up   - Could consider Palliative consult in the future for  GOC discussions, but holding off for now      # S/P lung transplant 2022 c/b right hemidiaphragm palsy  - Transplant pulmonology following                > Repeat immuknow assay 2/27 pending                > Repeat prospera in one month      Immunosuppression:   > Prednisone 5 mg q day, 2.5 mg afternoon   > Tacrolimus 4.5 mg   PPX:   >Dapsone MWF for PJP     Cardiovascular:  #HTN   #A-fib, resolved  No issues with Afib, currently SR. Most recent TTE on 2/18 with EF 55-60%, otherwise within normal limits. Remains hemodynamically stable and off pressors.   - MAP goal >60   - Continue to hold metoprolol      GI/Nutrition:  # Severe malnutrition (see RD note for details)   # FTT   # Gastroparesis, small bowel dysmotility  # S/P PEG/J with intolerance of enteral nutrition   # GERD  # Chronic osmotic diarrhea/SIBO s/p Rifaximin > improving   Patient with gastroparesis (presumed due to vagal injury) and small bowel dysmotility complicated by unintentional 40lb weight loss over the past year and now severe malnutrition. Previously intolerant to oral food intake due to nausea. Was initially admitted for portacath and TPN initiation since 2/13. After extubation has been able to tolerate feeds without n/v from 2/20. Per GI, next steps for workup for malnutrition would include CT enterography to evaluate for anatomic abnormalities contributing to malnutrition vs possible treatment for SIBO (though patient has had multiple courses of treatment in the past with no long term improvement).   - Okay for Full Liquid diet with short intermittent breaks from BiPAP  - Nutrition following                > Holding off on resuming tube feeds (had been stopped as patient was on regular diet with >300 kcal intake)                > Nephrology: limit fluid < 1.5 L per day for TPN (chart vol of TPN in the I/O)               > RD concerned pt is not absorbing any oral intake and they will be monitoring Bowel function, I/O and, PO/TF/TPN intake                > TPN restarted 2/28: concentrated > should now be receiving less intake via TPN   - CT enterography with contrast- Pt not able to tolerate oral contrast load of 1500 ml on 2/21; no ongoing concerns for SIBO, would need small bowel motility study if not improving outpatient through [facility]  - Daily Weights  - Monitor CMP in the AM   - Continue PPI   - Awaiting metabolic cart study         Renal/Fluids/Electrolytes:  # ESRD on HD   # Hypokalemia > improved   # Hypomagnesemia > improved   Normal HD schedule T/TH/Sat. Last run 2/27 with 3L removed.   - Nephrology following   - Plan for HD run today   - Continue to hold PO bicarb as could be ultimately converting to CO2, and compensation associated with hypercarbia      #Steal physiology of LUE dialysis access fistula  LUE arterial US obtained 2/21 with concern for LUE edema. US of fistula demonstrated steal physiology. Discussed with nephrology, and vascular surgery consulted on 2/22. Vascular surgery has only minor concerns for steal symptoms and given that the AVF is working well, they are okay with outpatient fistulograms/ venoplasty with wrist brachial index and PPG's, unless new concerns arise.  - plan for outpatient workup as above; nephrology in agreement with outpatient workup.     Endocrine:  # Hypoglycemia > improved   # Elevated TSH and low T4 & T3  Most recent A1c <4.2. Blood sugars around 70 upon admission to ICU. Patient with new low T4 at 0.64 > 0.69 and TSH at 6.5 > 4.85 and t>32, concerning for new hypothyroidism vs sick thyroid syndrome.   - Hypoglycemic protocol   - d10 infusion for hypoglycemia   - TPN started last evening   - Monitor glucose   - Started levothyroxine 25mcg daily with combination therapy of liothyronine 2.5 mcg BID to assist with ventilatory drive    > This could be a short treatment course or continued therapy   > Repeat TSH, t4 and t3 in one week 3/7 (ordered     ID:  # Hx of recurrent pneumonia   # Immunosuppression s/t  bilateral lung transplant   # Hx of EBV viremia   # Hypogammaglobulinemia  Chest xray and recent CT chest c/f pulmonary edema vs. infection although CT chest with known GGO and superimposed patch/nodular opacities showing slight improvement. WBC stable, afebrile and not on pressors, although procal elevated 1.56, lactate 0.6. Most recent micro blood cx, fungal, histo, cryptococcus negative. BAL from 2/18 with candida but likely colonization but otherwise NGTD. Cannot rule out ongoing infection at this time.   - Low threshold to restart abx if clinically decompensates   - Monitor fever curve and wbcs  - Repeat Blood cx: NGTD   - IGG 2/26: 655   - Received immune globulin 2/27     Antibiotics:  Vancomycin (2/17 - 2/17)  Zosyn (2/17 - 2/23)  Dapsone MWF, PJP ppx   Micafungin (2/18- 2/21)     Hematology:    # Acute on chronic Anemia 2/2 ESRD   # LUE unilateral edema   Hgb have been stable 7-8s, with no acute signs of bleeding, acute drop from 8.2 > 6.6 after two rechecks, no overt signs of bleeding noted, patient reports some abdominal pain but otherwise physical exam unremarkable.  LUE US negative for DVT 2/18.   - Ordered type and screen   - Ordered stat 1uPRBC   - Receives Venofer 50 mcg q week (Tuesday)   - Epogen dose 8000 units      Musculoskeletal:  # Right hip fracture s/p ORIF (December 2023)   - PT/OT     Skin:  # Facial and neck bruising   Reports soreness in her neck from lying in bed. No soreness in the buttocks/ back. Patient has multiple bruises over her arms and face which is attributed to previous falls.   - Continue to monitor      General Cares/Prophylaxis:    DVT Prophylaxis: Heparin subcutaneous (on hold in setting of hgb drop)  GI Prophylaxis: PPI  Restraints: N/A  Family Communication: Updated Daughter Julia on telephone 2/28. Call Larry alvarez.   Code Status: Full Code      Lines/tubes/drains:  - CVC RIJ (for TPN, is tunneled line)   - PIV x2  - PEG/J  - HD AV fistula, left arm        Disposition:  - Transfer back to : Sign out given.     Patient seen and findings/plan discussed with medical ICU staff, Dr. Stone.    RUFUS Dallas CNP    Clinically Significant Risk Factors              # Hypoalbuminemia: Lowest albumin = 2.6 g/dL at 2/18/2024  5:13 AM, will monitor as appropriate             # Severe Malnutrition: based on nutrition assessment    # Financial/Environmental Concerns: none            Total patient care time includes 35 minutes.     MICU STAFF CRITICAL CARE PROGRESS NOTE:    I saw and examined the patient with the MICU team and concur with findings and A&P as detailed in Ms Merino's above note of today.  See my independent note of today.    Miguel Angel Stone MD  MICU Staff  [phone]    ====================================  INTERVAL HISTORY:   NAD. Feels more like herself today, asking to eat and wants to leave the ICU. She is agreeing to continue wearing BiPAP as we have agreed to give her intermittent short breaks and allowing for her to eat. Plan for HD run today. Acute hgb drop this morning and received 1u PRBC, no overt signs of bleeding, could be slightly dilutional, planning to continue to monitor.     OBJECTIVE:   1. VITAL SIGNS:   Temp:  [98  F (36.7  C)-98.6  F (37  C)] 98.1  F (36.7  C)  Pulse:  [78-98] 93  Resp:  [10-23] 22  BP: ()/(48-75) 124/67  FiO2 (%):  [35 %] 35 %  SpO2:  [75 %-100 %] 97 %  FiO2 (%): 35 %  Resp: 22    2. INTAKE/ OUTPUT:   I/O last 3 completed shifts:  In: 1412.25 [I.V.:610.75; NG/GT:315; IV Piggyback:250]  Out: -     3. PHYSICAL EXAMINATION:  General: NAD, alert and conversant in bed.   HEENT: Normocephalic, atraumatic   Neuro: A&Ox4, NAD, no notable focal deficits   Pulm/Resp: Clear and diminished breath sounds bilaterally without rhonchi, crackles or wheeze, breathing non-labored on BiPAP   CV: RRR, s1/s2, no m/r/p appreciated. Mild peripheral edema   Abdomen: Soft, non-distended, non-tender to palpation. Hypoactive bowel sounds   :  anuric   Incisions/Skin: RUE/Neck bruising. No other rashes, or notable wounds     4. LABS:   Arterial Blood Gases   Recent Labs   Lab 02/28/24  0920   PH 7.16*   PCO2 97*   PO2 60*   HCO3 35*     Complete Blood Count   Recent Labs   Lab 02/29/24  0556 02/28/24  0558 02/27/24  0756 02/27/24  0556   WBC 6.0 7.1 8.0 7.9   HGB 6.6* 8.2* 7.9* 7.8*    293 283 280     Basic Metabolic Panel  Recent Labs   Lab 02/29/24  0332 02/28/24  2230 02/28/24  1710 02/28/24  1329 02/28/24  1101 02/28/24  0558 02/27/24  0556 02/26/24  0611     --   --   --   --  139 137 139   POTASSIUM 3.6  --   --   --   --  3.4 4.1 4.2   CHLORIDE 98  --   --   --   --  98 103 103   CO2 30*  --   --   --   --  32* 23 26   BUN 70.2*  --   --   --   --  50.7* 70.2* 53.2*   CR 3.47*  --   --   --   --  2.60* 4.42* 3.55*   * 123* 146* 99   < > 279* 152* 159*    < > = values in this interval not displayed.     Liver Function Tests  Recent Labs   Lab 02/29/24 0332 02/28/24  0558 02/27/24  0556 02/26/24  0611   AST 13 17 21 19   ALT 8 10 16 13   ALKPHOS 64 81 88 78   BILITOTAL 0.2 0.2 <0.2 <0.2   ALBUMIN 3.0* 3.4* 3.3* 3.3*   INR 1.09  --   --  0.97     Coagulation Profile  Recent Labs   Lab 02/29/24 0332 02/26/24  0611   INR 1.09 0.97       5. RADIOLOGY:   No results found for this or any previous visit (from the past 24 hour(s)).

## 2024-02-29 NOTE — PLAN OF CARE
ICU End of Shift Summary. See flowsheets for vital signs and detailed assessment.    Changes this shift:   Neuro: Pt AOx4 with RASS 0. Complained of pain today, prn acetaminophen given. Afebrile.  CV: SR, ST (90s-100s); SBP (120s-130s). MAPs >60. Hemoglobin 6.6 @0556, 1 unit of RBC given @0945; redrawn of hemoglobin 7.9 @1320.   Resp: BiPAP - FiO2 35% and EPAP 4. Pt able to switch to NC 3L for breaks or meals.   GI/: LLQ PEJ. Pt advanced to full liquid diet. 4hr dialysis run with 3.5L removed.     Transferred to General Medicine.     Plan:    MR Brain w/o w Contract.  Continue current plan of care.

## 2024-02-29 NOTE — PROGRESS NOTES
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 On 2/17/24 she was transferred to ICU for worsening mental status and acute hypoxic and hypercarbic respiratory failure inspite of BiPAP requiring intubation. She was suspected to have PNA and started on antibiotics. Extubated on 2/19 without complications and tolerated iHD on 2/20, and tolerated PO regular diet in addition to TPN. Developed progressively worsening respiratory acidosis and hypercarbia, and was re-admitted to ICU on 2/28/24 for close monitoring of intermittent BiPAP and possible intubation, however she improved on AVAPS setting and well enough to transfer back to medicine floor on 2/29/24.      Changes today:   - transferred back to medicine service  - await results of metabolic cart study for CO2 production  - continue to trend VBG with intermittent BiPAP  - MRI to evaluate for central cause of decreased respiratory drive, will give 0.5mg ativan prior to due to claustrophobia  - appreciate lung transplant team's involvement  - continue with dialysis per nephrology for  volume management   - continue theophylline and thyroid replacement per ICU team recommendations, repeat thyroid function labs in one week (3/6) for reassessment  - discontinue PO bicarb  - consider Palliative care consult Friday AM  - holding subQ heparin DVT ppx pending monitoring after transfusion on 2/29      #Severe respiratory acidosis, improved on BiPAP  #Acute hypoxic and hypercarbic respiratory failure  #S/P Lung transplant 2022 c/b right hemidiaphragm palsy  #Recurrent pneumonia, resolved  Patient received a bilateral lung transplant 6/28/22 for COPD. Was admitted 2/10 to address malnutrition and admitted to ICU on 2/17 with hypoxic hypercarbic respiratory failure. Intubated on 2/18 AM  due to worsening oxygen requirements, likely due to pulmonary edema given hx of ESRD requiring HD vs infection given hx of lung transplant, immunosuppressed status, and recurrent pneumonias. Extubated on 2/19 without complications,   Micro Follow-up on BAL, viral panel, CMV, fungus, and Blood Cultures show no abnormalities. Does have slowly rising pCO2 on VBG - HFNC did not seem to improve elevated pCO2 and instead seemed to potentially cause worsening, possibly due to depressed respiratory drive from high O2. Patient initially was adamant that she would not tolerate BiPAP, however was agreeable to trial on 2/27 PM. Despite intermittent BiPAP overnight, VBG was worse and patient transferred to ICU on 2/28 AM. RT adjusted BiPAP settings while in ICU to AVAPS with improvement in mental status and VBG, and patient transferred back to medicine floor on 2/29. ICU team additionally added theophylline and thyroid replacement as both can help with diaphragmatic/respiratory muscle weakness in setting of COPD and malnutrition. MRI pending for evaluation of possible central cause of decreased ventilatory drive.   - discussed with transplant pulmonary team - once mentally clear, would like to evaluate GOC with palliative care team  (expect to consult on Friday 3/1) given concern that TPN (which is her main source of nutrition) is causing hypervolemia that may require daily dialysis to manage volume, and may also be contributing to her respiratory acidosis  - neurology consulted to assist with evaluation for possible central etiology of hypercapnic respiratory failure - MRI ordered, prn for claustrophobia ordered   - discontinued sodium bicarb  - PRN hypertonic saline inhaler with albuterol nebs  - Continue good pulm toilet  - Transplant pulmonology following, recs appreciated  - PTA immunosuppressive agent  >Prednisone 5 mg every morning, 2.5 mg every afternoon; tacrolimus 4 mg every morning, 4mg every afternoon  > Monitor tacro levels, goal 7-9  > - overnight oximetry study suggestive of O2 vs CPAP need, as did require up to 2LPM overnight to prevent hypoxia  > Try to minimize O2 to preserve respiratory drive, will give IVIG for DSA+   - avoid HFNC, maintain minimum oxygen to keep SaO2 >85% - pending recs from transplant pulm  - PTA dapsone MWF for PJP prophylaxis  - completed course of zosyn for empiric HAP course (2/17 - 2-23)  - Initial decompensation likely from opioids - limit medications that would depress respiration   - port culture per transplant ID rec- [id number]  - awaiting metabolic CART study results        Antibiotics:     Vancomycin (2/17 - 2/17)  Zosyn (2/17 - 2/23)  Dapsone MWF, PJP ppx   Micafungin (2/18- 2/21)     Immunosuppression  Tacrolimus  Prednisone       #Severe malnutrition   #FTT   #Hypoalbuminemia  #Gastroparesis, small bowel dysmotility  #S/P PEG/J with intolerance of enteral nutrition  # Chronic osmotic diarrhea/SIBO s/p Rifaximin > improving     Patient with gastroparesis (presumed due to vagal injury) and small bowel dysmotility complicated by unintentional 40lb weight loss over the past year and now severe malnutrition. Previously intolerant to oral food intake due to nausea. Was initially admitted for [facility]  and TPN initiation since 2/13. After extubation has been able to tolerate feeds without n/v from 2/20. Electrolytes trending towards baseline today.  Per GI, next steps for workup for malnutrition would include CT enterography to evaluate for anatomic abnormalities contributing to malnutrition vs possible treatment for SIBO (though patient has had multiple courses of treatment in the past with no long term improvement). Patient couldn't tolerate the oral load for CT enterography on 2/21, so will defer this until she is able to tolerate greater PO load. On 2/23 , again discussed with patient the need of small bowel motility study if not improving outpatient through [facility]. No ongoing concerns for SIBO on 2/23 as patient is passing multiple episodes of stools and gas with no abdominal pain or distention. C-diff test negative on 2/21. Per RD, patient tolerating >300 kcal of intake PO currently, so stopped trickle Tube feeds and continuing with PO and TPN for nutrition.   - Regular diet + increased TPN +  no further tube feeds per RD & transplant pul discussion               - Nephrology: limit fluid < 1.5 L per day for TPN ( chart vol of TPN in the I/O)               - RD concerned pt is not absorbing any oral intake and they will be monitoring Bowel function, I/O and, PO/TF/TPN intake.               -  discussed with tx pulm team - stopped TF as PO intake sufficient for preventing gut atrophy (>300 kcal per day)  - Continue calorie count  - CT enterography with contrast- Pt not able to tolerate oral contrast load of 1500 ml on 2/21; no ongoing concerns for SIBO, would need small bowel motility study if not improving outpatient through [facility]  - Daily Weights  - monitor CMP in the AM      #Acute on chronic Anemia 2/2 ESRD  Hgb have been stable 7-8s, with no acute signs of bleeding, acute drop 2/29 from 8.2 > 6.6 after two rechecks, no overt signs of bleeding noted, patient reports some abdominal pain but otherwise physical  exam unremarkable.  LUE US negative for DVT 2/18.    - continue epogen per nephrology with dialysis  - venofer 50 mcg qweek with dialysis  - type and screen performed 2/29, 1 unit pRBCs ordered and transfused  - will recheck Hgb 1 hour after completing transfusion      #ESRD on HD T/Th/Sat  #Hypervolemic hyponatremia  #Anion gap metabolic acidosis - resolved  Patient is ESRD on T/Th/Sat HD, Hgb stabilizing. HD tolerating well. Continuing monitoring electrolytes daily. Anion gap normal since 2/21. Will continue to monitor daily labs/ABG  for refeeding syndrome and acidosis  - Continue Venofer injections    - CBC and CMP daily  - Continue epogen dose 8000 units as per nephrology  - Strict I/Os  - HD T/TH/Sat per nephrology vs daily UF per nephrology       # Elevated TSH and low T4 and T3  Patient with new low T4 at 0.64 and TSH at 6.5, concerning for new hypothyroidism vs sick thyroid syndrome. TSH with appropriate response, more consistent with elevation in the setting of acute illness. ICU team on 2/28 recommended initiation of treatment for possible hypothyroid as this can improve respiratory muscle function in the setting of weakness and malnutrition.   - continue liothyronine and levothyroxine per ICU team recommendations  - repeat thyroid function studies in one week, adjust dosing as needed      #Left upper extremity unilateral edema, improving   #Bilateral pedal and ankle edema, improving  Edema due to third spacing due to hypoalbuminemia vs HF. BNP >87613 at admission, Echo on 2/18 shows EF of 55-60% with IVC of <2.1 and collapsing >50% with sniff, normal RA pressure, no significant valvular abnormalities ;  Left upper extremity swelling and  pitting lower extremity edema likely due to hypoalbuminemia improved today. Upper limb duplex USG on 2/18 negative for DVT. Wt went from 43.4 kg on admission to 47.4 kg today. She is urinating but cannot chart as she usually urinates with BM. She reports trending to  baseline with urination. She denies dysuria, retention symptoms. USG left arm on 2/21 shows steel physiology and Vascular Surgery consulted. Vascular surgery has only minor concerns for steal symptoms and given that the AVF is working well, they are okay with outpatient fistulograms/ venoplasty with wrist brachial index and PPG's, unless new concerns arise. LUE and LE edema significantly decreased since yesterdays HD. No new tingling/ numbness noticed.  - Continue daily weights  - Strict I/Os  - Elevation and wrapping of only lower legs as needed and increased UF per nephrology for volume management; no wrapping of Left upper extremity with dialysis fistula       #Steal physiology of LUE dialysis access fistula  LUE arterial US obtained 2/21 with concern for LUE edema. US of fistula demonstrated steal physiology. Discussed with nephrology, and vascular surgery consulted on 2/22. Vascular surgery has only minor concerns for steal symptoms and given that the AVF is working well, they are okay with outpatient fistulograms/ venoplasty with wrist brachial index and PPG's, unless new concerns arise.  - plan for outpatient workup as above; nephrology in agreement with outpatient workup.       #Facial and neck bruising  #Pain  Reports soreness in her neck from lying in bed. No soreness in the buttocks/ back. Patient has multiple bruises over her arms and face which is attributed to previous falls. No new bruising reported today. Patients reports her headaches are improving with tylenol. Using heating pads and lidocaine patch for body pains. Couple of small skin tears noted by the Rn's. Skin care done accordingly.  - Tylenol Q4  - Lidocaine patch prn  - Heating pads prn  - Diligent skin cares       #HTN  #A-fib, resolved  Noted atrial fibrillation on arrival with HR elevated at 150, not sustained for > 10 minutes and otherwise hemodynamically stable. Rates stable in the 80's. BP normalizing since 2/22.  - PTA metoprolol 25mg  BID - holding for now with lower BP after transfer to ICU and with increased UF per nephrology, resume if becoming more HTN  - Continuous telemetry       # Encephalopathy secondary to hypercarbia - resolved  Patient was progressively more lethargic on 2/17 during admission in [facility]. Etiology likely secondary to hypercarbia in context of respiratory failure as patient was noted to have high CO2s concomitant with lethargy. Though receiving oxycodone and fentanyl, lethargy was only partially alleviated by narcan. LFTs and ammonia wnl with low suspicion of hepatic encephalopathy. BUN wnl with low suspicion for uremic encephalopathy. Head CT on 2/18 was negative with low suspicion of acute intracranial pathology. Patient is awake, alert, oriented and following commands since 2/20.        # Right hip fracture s/p ORIF (December 2023)   - PT/OT       # GERD  - PTA PPI       # Hx EBV viremia   # Hypogammaglobulinemia  # Chronic immunosuppression 2/2 lung transplant  Patient has been afebrile and has not had leukocytosis however she is under immunosuppression. Given acute respiratory failure and hx of recurrent pneumonias, she was started on empiric abx for concerns over new pneumonia. RVP and cultures no growth to date. Last day of empirical treatment for HAP.  - EBV 27K (decreased compared to prior)         Lines/tubes/drains:  - CVC RIJ (for TPN, is tunneled line)   - PIV x2  - PEG/J  - HD AV fistula, left arm         Diet:   Regular diet PO  Holding trickle tube feeds if persistent PO intake >300 kcal/day  Majority of nutrition through TPN per RD     General Cares/Prophylaxis:    DVT Prophylaxis: Heparin subcutaneous - holding with need for transfusion on 2/29, will monitor Hgb for signs of bleeding before resuming  GI Prophylaxis: PPI  Fluids: As per TPN  Code Status: Full        Diet: parenteral nutrition - ADULT compounded formula  Full Liquid Diet  parenteral nutrition - ADULT compounded formula    DVT  Prophylaxis: holding subQ heparin for now while monitoring after transfusion 2/29  Dong Catheter: Not present  Fluids: Per TPN and PO  Lines: PRESENT      CVC Single Lumen Right Internal jugular Non - valved (open ended);Tunneled;Power injectable-Site Assessment: WDL  Hemodialysis Vascular Access Arteriovenous graft Superior Arm-Site Assessment: Unable to visulaize;Bruit present;Thrill present      Cardiac Monitoring: ACTIVE order. Indication: ICU  Code Status: Full Code      Clinically Significant Risk Factors              # Hypoalbuminemia: Lowest albumin = 2.6 g/dL at 2/18/2024  5:13 AM, will monitor as appropriate             # Severe Malnutrition: based on nutrition assessment    # Financial/Environmental Concerns: none         Disposition Plan     Expected Discharge Date: 03/04/2024      Destination: home with family;home with help/services  Discharge Comments: Should be med ready once severe respiratory acidosis is figured out. Not on HFNC for O2 - on it for PEEP for CO2. Needs TPN home infusion. Will d/c home with daughter per pt choice. Will need home PT services set up.        The patient's care was discussed with the Attending Physician, Dr. Delacruz and Patient.    MD Chris Henry 1 Teaching Service  [facility]  Securely message with ididwork (more info)  Text page via [facility] Paging/Directory   ______________________________________________________________________    Interval History   No acute events overnight. VBG improved with use of intermittent BiPAP, with occasional 30 minute breaks for PO and to take a break from the mask, in ICU with titration of settings by RT. Patient much more awake, alert, and fully oriented this morning. States she feels well enough to transfer out of the ICU, and is eager to transfer back to the medicine floor. Is hungry this morning and ready to eat. Has had some intermittent nausea, however this is improved with current  regimen of anti-emetics. No other complaints, otherwise feeling well/improved. Is agreeable and understanding of importance of continuing to use BiPAP as needed for respiratory acidosis.     Physical Exam   Vital Signs: Temp: 98.9  F (37.2  C) Temp src: Axillary BP: 130/77 Pulse: 108   Resp: 25 SpO2: 99 % O2 Device: BiPAP/CPAP Oxygen Delivery: 3 LPM  Weight: 111 lbs 12.37 oz    General: thin, alert and fully oriented, sitting up in bed with BiPAP at bedside, able to fully participate in conversation  HEENT: Normocephalic, bruising on the right face around right orbit with hematoma and right neck, improved  Neuro: very awake and alert, fully oriented (even able to report that she used to be on 7C and now is on 4C in the ICU at [facility]), following commands, answering questions clearly and easily, moving all extremities  Pulm/Resp: breathing on NC with BiPAP at bedside, faint crackles and diminished lung sounds in bases bilaterally, no increased work of breathing  CV: RRR, warm extremities, 1+ pitting edema in left hand, no edema in right arm or hand  Abdomen: Non-distended, PEG in place, non-tender  Incisions/Skin: Bruising to face and right forearm.     Medical Decision Making       Please see A&P for additional details of medical decision making.      Data     I have personally reviewed the following data over the past 24 hrs:    6.0  \   7.9 (L)   / 253     139 98 70.2 (H) /  118 (H)   3.6 30 (H) 3.47 (H) \     ALT: 8 AST: 13 AP: 64 TBILI: 0.2   ALB: 3.0 (L) TOT PROTEIN: 5.2 (L) LIPASE: N/A     TSH: N/A T4: N/A A1C: N/A     INR:  1.09 PTT:  N/A   D-dimer:  N/A Fibrinogen:  N/A       Imaging results reviewed over the past 24 hrs:   No results found for this or any previous visit (from the past 24 hour(s)).

## 2024-02-29 NOTE — PLAN OF CARE
ICU End of Shift Summary. See flowsheets for vital signs and detailed assessment.    Changes this shift:     Neuro: Intermittently confused. Disoriented to time and situation. Affect is flat. Mood labile. Suspicious for most of the shift. VBG improving. Encouraged good hygiene but pt declined so she's been awake all night. Turned and repositioned with encouragement.  CV/Hematology: Hemodynamically stable. Sinus rhythm/sinus/tachy. Hgb 6.4 initially resulted - sample drawn from central line. Scheduled another redraw for lab collect. Hgb = 6.6. MICU resident notified.  Resp: BIBAP settings unchanged. 35& FiO2, RR 18, AVAPs mode. At 0550, pt removed BIPAP, refused to put it back until she gets something to eat. Pt refused to put BIPAP back on until she desatted to 78% and another staff came in to persuade her.  GI/: Anuric. Loose stools noted x2 this shift. TPN infusing via central line.  Skin: Scattered bruising on arms, neck. Blanchable redness on sacrum/coccyx. Refused her CHG bath last night. Staff was only able to wipe down central lines, her chest, her IJ site and back.  LDA: PIVs; 1 infusing TKO, 1 saline locked.      Plan: Continue plan of care. Trend labs.    Goal Outcome Evaluation:    Plan of care reviewed: with patient

## 2024-02-29 NOTE — PROGRESS NOTES
MICU STAFF CRITICAL CARE PROGRESS NOTE:    I saw and examined the patient with the MICU team and concur with findings and A&P as detailed in Kalpana HOOPER's note of today    64 yo female with PMH COPD s/p bilat lung txpl 6/22 complicated by HD palsy, recurrent pneumonias; severe malnutrtion, ESRD on HD;    Admitted 2/10 to [facility] for TPN for malnutrition. Developed acute hypoxic and hypercapnic resp failure with intubation 2/17, extubated 2/19 and ate cheesburger & tater tots witin an hour.  Moved to floor but has developed gradually worsening hypercapnia, decreased mental status and VBG PCO2. She had been refusing BiPAP on floor due to claustrophobia.  High Flow NC was not helpful.  Was on oral bicarbonate (??).  Had nl serum HCO3 22-26 up until last few days.  Last night became more somnolent with VBGs showing rising PCO2 and lower pH  HD yesterday leading to ICU readmit 2/28/2024    Alert and oriented, on BiPAP overnight and tolerated well  Consented for MRI  Hgb 8.2 to 6.6 w/o overt bleeding    SR 80-90 RR 20-24 MAPs good 35%  AVAPS EPAP 4 P 25/10 Vt 400  VBG 7.28/71/44  Alert & oriented x3; no focal neuro deficits  Lung sounds diminished  RRR nl S1 and S2  Hypoactive BS, nontender but c/o mild pain  I:O  net 3.6L (?) weight stable from yest 50.7 Kg (50.5)    Hgb 6.6 (being Tx'd) WBC 6 (7.1) Plt 253K  Stable Na, K 3.6 HCO3 30  Creat 3.47 (2.6)  Mg 2.2 (repleted); Jarett pending  Micro NGTD  Theophylline level pending  TFTs - TSH 4.85 (6.5) T3 32 T4 0.69  Metabolic cart study:interpretation pending    Chronic Resp Failure / s/p Bilateral Lung Txpl for COPD  Doing better with BiPAP  Still no evidence of infection -  Holding oral HCO3 tab  Respiratory muscle dysfunction partially malnutritional  Patient would accept temporary intubatoin and potential trach if necessary (need input from Lung Txpl service)  P:  Avoid respiratory depressants (but not on any and needs good nutrition to help resp muscles  (slow)  Ensure HD is sufficient - need to minimize pulm edema  Brainstem MRI ordered by Neuro to look at brainstem  Maintain normal K, Ca,I, Mg, PO4  Theophylline - check levels and continue suspension  Discuss other Rx with Lung Transplant service - could she have component of worsening OB/GVHD??  Consider thyroid hormone repletion in short-term (at least); non-thyroidal illness syndrome not always benign (thinking about this may be changing.  She may need trach if we can't get significant improvement that is sustained     ESRD   On HD and will get run today - important to get to dry weight quickly to help avoid need for intubation     Borderline Hypotension  Got metoprolol yesterday AM   Don't want to give any excess fluids if possible  Consider midodrine if hypotension returns     Severe Malnutrition on TPN  TPN concentrated     ID / Recurrent Pneumonias / Immunosuppression  Was on Vanco/Zosyn 2/17-2/23 and Micafungin briefly  No fever or elevated WBC - not started empiric Abx @ present     Acute on Chronic Anemia  No acute     LUE Unitlateral Edema  Neg U/S on 2/18  Fistula on L - working well at present     Neuro / Mild Encephalopathy   Avoid sedative and depressant meds     PPI / subC heparin  Full code  CVC R internal jugular for TPN; periheral iv; PEG; L Fistula     (Critical Care 75 minutes cumulative thus far today)     Miguel Angel Stone MD  MICU Staff  [phone]

## 2024-03-01 ENCOUNTER — APPOINTMENT (OUTPATIENT)
Dept: PHYSICAL THERAPY | Facility: CLINIC | Age: 64
DRG: 640 | End: 2024-03-01
Attending: INTERNAL MEDICINE
Payer: MEDICARE

## 2024-03-01 ENCOUNTER — APPOINTMENT (OUTPATIENT)
Dept: MRI IMAGING | Facility: CLINIC | Age: 64
DRG: 640 | End: 2024-03-01
Attending: INTERNAL MEDICINE
Payer: MEDICARE

## 2024-03-01 LAB
ALBUMIN SERPL BCG-MCNC: 3.1 G/DL (ref 3.5–5.2)
ALP SERPL-CCNC: 63 U/L (ref 40–150)
ALT SERPL W P-5'-P-CCNC: 7 U/L (ref 0–50)
ANION GAP SERPL CALCULATED.3IONS-SCNC: 8 MMOL/L (ref 7–15)
AST SERPL W P-5'-P-CCNC: 13 U/L (ref 0–45)
BASE EXCESS BLDV CALC-SCNC: 5.4 MMOL/L (ref -3–3)
BASE EXCESS BLDV CALC-SCNC: 6.3 MMOL/L (ref -3–3)
BASOPHILS # BLD AUTO: ABNORMAL 10*3/UL
BASOPHILS # BLD MANUAL: 0.1 10E3/UL (ref 0–0.2)
BASOPHILS NFR BLD AUTO: ABNORMAL %
BASOPHILS NFR BLD MANUAL: 2 %
BILIRUB SERPL-MCNC: 0.3 MG/DL
BUN SERPL-MCNC: 28 MG/DL (ref 8–23)
CALCIUM SERPL-MCNC: 8.3 MG/DL (ref 8.8–10.2)
CHLORIDE SERPL-SCNC: 99 MMOL/L (ref 98–107)
CREAT SERPL-MCNC: 1.86 MG/DL (ref 0.51–0.95)
DEPRECATED HCO3 PLAS-SCNC: 29 MMOL/L (ref 22–29)
EGFRCR SERPLBLD CKD-EPI 2021: 30 ML/MIN/1.73M2
EOSINOPHIL # BLD AUTO: ABNORMAL 10*3/UL
EOSINOPHIL # BLD MANUAL: 0.1 10E3/UL (ref 0–0.7)
EOSINOPHIL NFR BLD AUTO: ABNORMAL %
EOSINOPHIL NFR BLD MANUAL: 2 %
ERYTHROCYTE [DISTWIDTH] IN BLOOD BY AUTOMATED COUNT: 23.7 % (ref 10–15)
GLUCOSE BLDC GLUCOMTR-MCNC: 120 MG/DL (ref 70–99)
GLUCOSE BLDC GLUCOMTR-MCNC: 145 MG/DL (ref 70–99)
GLUCOSE SERPL-MCNC: 144 MG/DL (ref 70–99)
HCO3 BLDV-SCNC: 34 MMOL/L (ref 21–28)
HCO3 BLDV-SCNC: 34 MMOL/L (ref 21–28)
HCT VFR BLD AUTO: 26.9 % (ref 35–47)
HGB BLD-MCNC: 8.3 G/DL (ref 11.7–15.7)
IMM GRANULOCYTES # BLD: ABNORMAL 10*3/UL
IMM GRANULOCYTES NFR BLD: ABNORMAL %
LYMPHOCYTES # BLD AUTO: ABNORMAL 10*3/UL
LYMPHOCYTES # BLD MANUAL: 0.5 10E3/UL (ref 0.8–5.3)
LYMPHOCYTES NFR BLD AUTO: ABNORMAL %
LYMPHOCYTES NFR BLD MANUAL: 10 %
MAGNESIUM SERPL-MCNC: 1.8 MG/DL (ref 1.7–2.3)
MAGNESIUM SERPL-MCNC: 2 MG/DL (ref 1.7–2.3)
MCH RBC QN AUTO: 35.8 PG (ref 26.5–33)
MCHC RBC AUTO-ENTMCNC: 30.9 G/DL (ref 31.5–36.5)
MCV RBC AUTO: 116 FL (ref 78–100)
METAMYELOCYTES # BLD MANUAL: 0.2 10E3/UL
METAMYELOCYTES NFR BLD MANUAL: 3 %
MONOCYTES # BLD AUTO: ABNORMAL 10*3/UL
MONOCYTES # BLD MANUAL: 0.5 10E3/UL (ref 0–1.3)
MONOCYTES NFR BLD AUTO: ABNORMAL %
MONOCYTES NFR BLD MANUAL: 10 %
MYELOCYTES # BLD MANUAL: 0.1 10E3/UL
MYELOCYTES NFR BLD MANUAL: 1 %
NEUTROPHILS # BLD AUTO: ABNORMAL 10*3/UL
NEUTROPHILS # BLD MANUAL: 3.9 10E3/UL (ref 1.6–8.3)
NEUTROPHILS NFR BLD AUTO: ABNORMAL %
NEUTROPHILS NFR BLD MANUAL: 72 %
NRBC # BLD AUTO: 0 10E3/UL
NRBC # BLD AUTO: 0.1 10E3/UL
NRBC BLD AUTO-RTO: 1 /100
NRBC BLD MANUAL-RTO: 1 %
O2/TOTAL GAS SETTING VFR VENT: 28 %
O2/TOTAL GAS SETTING VFR VENT: 35 %
OXYHGB MFR BLDV: 75 % (ref 70–75)
OXYHGB MFR BLDV: 82 % (ref 70–75)
PCO2 BLDV: 72 MM HG (ref 40–50)
PCO2 BLDV: 72 MM HG (ref 40–50)
PH BLDV: 7.28 [PH] (ref 7.32–7.43)
PH BLDV: 7.29 [PH] (ref 7.32–7.43)
PHOSPHATE SERPL-MCNC: 2.1 MG/DL (ref 2.5–4.5)
PHOSPHATE SERPL-MCNC: 2.9 MG/DL (ref 2.5–4.5)
PLAT MORPH BLD: ABNORMAL
PLATELET # BLD AUTO: 240 10E3/UL (ref 150–450)
PO2 BLDV: 43 MM HG (ref 25–47)
PO2 BLDV: 51 MM HG (ref 25–47)
POLYCHROMASIA BLD QL SMEAR: SLIGHT
POTASSIUM SERPL-SCNC: 3.1 MMOL/L (ref 3.4–5.3)
PROT SERPL-MCNC: 5.3 G/DL (ref 6.4–8.3)
RBC # BLD AUTO: 2.32 10E6/UL (ref 3.8–5.2)
RBC MORPH BLD: ABNORMAL
SAO2 % BLDV: 77.1 % (ref 70–75)
SAO2 % BLDV: 84.7 % (ref 70–75)
SCANNED LAB RESULT: NORMAL
SODIUM SERPL-SCNC: 136 MMOL/L (ref 135–145)
WBC # BLD AUTO: 5.4 10E3/UL (ref 4–11)

## 2024-03-01 PROCEDURE — 99233 SBSQ HOSP IP/OBS HIGH 50: CPT | Mod: FS | Performed by: NURSE PRACTITIONER

## 2024-03-01 PROCEDURE — 83735 ASSAY OF MAGNESIUM: CPT

## 2024-03-01 PROCEDURE — 70553 MRI BRAIN STEM W/O & W/DYE: CPT | Mod: MG

## 2024-03-01 PROCEDURE — 255N000002 HC RX 255 OP 636: Performed by: INTERNAL MEDICINE

## 2024-03-01 PROCEDURE — G1010 CDSM STANSON: HCPCS | Performed by: RADIOLOGY

## 2024-03-01 PROCEDURE — 84100 ASSAY OF PHOSPHORUS: CPT

## 2024-03-01 PROCEDURE — 250N000013 HC RX MED GY IP 250 OP 250 PS 637

## 2024-03-01 PROCEDURE — 99233 SBSQ HOSP IP/OBS HIGH 50: CPT | Performed by: INTERNAL MEDICINE

## 2024-03-01 PROCEDURE — 85007 BL SMEAR W/DIFF WBC COUNT: CPT

## 2024-03-01 PROCEDURE — 250N000009 HC RX 250

## 2024-03-01 PROCEDURE — 82040 ASSAY OF SERUM ALBUMIN: CPT

## 2024-03-01 PROCEDURE — 250N000009 HC RX 250: Performed by: INTERNAL MEDICINE

## 2024-03-01 PROCEDURE — 82805 BLOOD GASES W/O2 SATURATION: CPT | Performed by: NURSE PRACTITIONER

## 2024-03-01 PROCEDURE — 94660 CPAP INITIATION&MGMT: CPT

## 2024-03-01 PROCEDURE — 999N000157 HC STATISTIC RCP TIME EA 10 MIN

## 2024-03-01 PROCEDURE — 250N000013 HC RX MED GY IP 250 OP 250 PS 637: Performed by: INTERNAL MEDICINE

## 2024-03-01 PROCEDURE — 99221 1ST HOSP IP/OBS SF/LOW 40: CPT | Mod: GV | Performed by: INTERNAL MEDICINE

## 2024-03-01 PROCEDURE — 250N000012 HC RX MED GY IP 250 OP 636 PS 637

## 2024-03-01 PROCEDURE — 120N000002 HC R&B MED SURG/OB UMMC

## 2024-03-01 PROCEDURE — 99233 SBSQ HOSP IP/OBS HIGH 50: CPT | Mod: FS | Performed by: PHYSICIAN ASSISTANT

## 2024-03-01 PROCEDURE — 94640 AIRWAY INHALATION TREATMENT: CPT | Mod: 76

## 2024-03-01 PROCEDURE — A9585 GADOBUTROL INJECTION: HCPCS | Performed by: INTERNAL MEDICINE

## 2024-03-01 PROCEDURE — 94640 AIRWAY INHALATION TREATMENT: CPT

## 2024-03-01 PROCEDURE — 250N000012 HC RX MED GY IP 250 OP 636 PS 637: Performed by: NURSE PRACTITIONER

## 2024-03-01 PROCEDURE — 99207 PR NO BILLABLE SERVICE THIS VISIT: CPT | Performed by: NURSE PRACTITIONER

## 2024-03-01 PROCEDURE — 70553 MRI BRAIN STEM W/O & W/DYE: CPT | Mod: 26 | Performed by: RADIOLOGY

## 2024-03-01 PROCEDURE — 85027 COMPLETE CBC AUTOMATED: CPT

## 2024-03-01 PROCEDURE — 97530 THERAPEUTIC ACTIVITIES: CPT | Mod: GP

## 2024-03-01 RX ORDER — POTASSIUM CHLORIDE 750 MG/1
40 TABLET, EXTENDED RELEASE ORAL ONCE
Status: COMPLETED | OUTPATIENT
Start: 2024-03-01 | End: 2024-03-01

## 2024-03-01 RX ORDER — THEOPHYLLINE ANHYDROUS 80 MG/15ML
3 SOLUTION ORAL ONCE
Status: COMPLETED | OUTPATIENT
Start: 2024-03-01 | End: 2024-03-01

## 2024-03-01 RX ORDER — LORAZEPAM 0.5 MG/1
0.5 TABLET ORAL
Status: COMPLETED | OUTPATIENT
Start: 2024-03-01 | End: 2024-03-01

## 2024-03-01 RX ORDER — GADOBUTROL 604.72 MG/ML
7.5 INJECTION INTRAVENOUS ONCE
Status: COMPLETED | OUTPATIENT
Start: 2024-03-01 | End: 2024-03-01

## 2024-03-01 RX ADMIN — LEVALBUTEROL HYDROCHLORIDE 1.25 MG: 1.25 SOLUTION RESPIRATORY (INHALATION) at 07:20

## 2024-03-01 RX ADMIN — POTASSIUM & SODIUM PHOSPHATES POWDER PACK 280-160-250 MG 1 PACKET: 280-160-250 PACK at 08:05

## 2024-03-01 RX ADMIN — CALCIUM CARBONATE 600 MG (1,500 MG)-VITAMIN D3 400 UNIT TABLET 1 TABLET: at 12:38

## 2024-03-01 RX ADMIN — MAGNESIUM SULFATE HEPTAHYDRATE: 500 INJECTION, SOLUTION INTRAMUSCULAR; INTRAVENOUS at 21:14

## 2024-03-01 RX ADMIN — LORAZEPAM 0.5 MG: 0.5 TABLET ORAL at 16:17

## 2024-03-01 RX ADMIN — THEOPHYLLINE 100 MG: 80 SOLUTION ORAL at 22:02

## 2024-03-01 RX ADMIN — TACROLIMUS 4.5 MG: 5 CAPSULE ORAL at 08:05

## 2024-03-01 RX ADMIN — POTASSIUM & SODIUM PHOSPHATES POWDER PACK 280-160-250 MG 1 PACKET: 280-160-250 PACK at 19:37

## 2024-03-01 RX ADMIN — Medication 2.5 MCG: at 19:37

## 2024-03-01 RX ADMIN — TACROLIMUS 4.5 MG: 5 CAPSULE ORAL at 17:44

## 2024-03-01 RX ADMIN — Medication 2.5 MCG: at 08:06

## 2024-03-01 RX ADMIN — THEOPHYLLINE 100 MG: 80 SOLUTION ORAL at 06:26

## 2024-03-01 RX ADMIN — PREDNISONE 2.5 MG: 2.5 TABLET ORAL at 19:37

## 2024-03-01 RX ADMIN — Medication 40 MG: at 19:37

## 2024-03-01 RX ADMIN — CYANOCOBALAMIN TAB 500 MCG 500 MCG: 500 TAB at 08:08

## 2024-03-01 RX ADMIN — DAPSONE 50 MG: 100 TABLET ORAL at 08:06

## 2024-03-01 RX ADMIN — CALCIUM CARBONATE 600 MG (1,500 MG)-VITAMIN D3 400 UNIT TABLET 1 TABLET: at 08:04

## 2024-03-01 RX ADMIN — POTASSIUM CHLORIDE 40 MEQ: 750 TABLET, EXTENDED RELEASE ORAL at 08:04

## 2024-03-01 RX ADMIN — LEVOTHYROXINE SODIUM 25 MCG: 0.03 TABLET ORAL at 08:05

## 2024-03-01 RX ADMIN — GADOBUTROL 5 ML: 604.72 INJECTION INTRAVENOUS at 17:39

## 2024-03-01 RX ADMIN — Medication 40 MG: at 08:08

## 2024-03-01 RX ADMIN — THEOPHYLLINE 100 MG: 80 SOLUTION ORAL at 15:46

## 2024-03-01 RX ADMIN — PREDNISONE 5 MG: 5 TABLET ORAL at 08:04

## 2024-03-01 RX ADMIN — LIDOCAINE 4% 1 PATCH: 40 PATCH TOPICAL at 19:38

## 2024-03-01 RX ADMIN — THEOPHYLLINE 140 MG: 80 SOLUTION ORAL at 12:38

## 2024-03-01 RX ADMIN — LEVALBUTEROL HYDROCHLORIDE 1.25 MG: 1.25 SOLUTION RESPIRATORY (INHALATION) at 20:48

## 2024-03-01 RX ADMIN — CALCIUM CARBONATE 600 MG (1,500 MG)-VITAMIN D3 400 UNIT TABLET 1 TABLET: at 17:44

## 2024-03-01 ASSESSMENT — ACTIVITIES OF DAILY LIVING (ADL)
ADLS_ACUITY_SCORE: 36

## 2024-03-01 NOTE — PLAN OF CARE
ICU End of Shift Summary. See flowsheets for vital signs and detailed assessment.    Changes this shift: Afebrile, A&Ox4. FC, CARMONA. Tele removed per MD order, prior to that SR-ST . BP stable. Tolerating 3L NC and BIPAP @ night. Anuric, plan for HD tomorrow. Multiple watery BM. Tolerating Renal diet w/ decent appetite. Tolerating meds via PEG tube. TPN infusing. K & Phos replaced per MD order. MRI brain today w/ oral ativan given for anxiety.    Plan: HD tomorrow. Transfer to med/surg no tele when bed available.     Goal Outcome Evaluation:      Plan of Care Reviewed With: patient    Overall Patient Progress: improvingOverall Patient Progress: improving    Outcome Evaluation: Tolerated 3L O2 all day, MRI brain today. Up in chair.

## 2024-03-01 NOTE — PROGRESS NOTES
Nephrology Progress Note  2024         Assessment & Recommendations:   Sofie Rodriguez is a 63 year old year old female with ESKD, COPD s/p b/l lung transplant in 2022 with multiple complications, gastroparesis s/p GJ tube, GIB, chronic diarrhea, recurrent c-diff, FTT with inability to tolerate any tube feeds, R hip fx s/p ORIF 2023, admitted on 2/10 for initiation of TPN/lipids, transferred to ICU on  with worsening mental status and respiratory acidosis in spite of bipap, ultimately intubated on , being treated for PNA, also with volume overload in setting of high volume TPN. Persistent respiratory acidosis in spite of volume off, transferred to ICU for hypotension and respiratory failure, improved on bipap, now floor status again 3/1    # ESKD - TTS, LUE AVG, 3hr, 45.5kg (will be lowered), [facility], Dr. Andres Fairbanks. She has been losing weight and now even below her recent set EDW.  - HD yesterday with 3.5 kg UF, plan next run tomorrow (4 hrs, 3.5 kg UF goal)  - Requires EMLA cream an hour before HD       # Dialysis access: LUE AVG placed 3-4 months ago, per pt. She had arm swelling and a fistulogram a couple months ago and swelling improved but now has redeveloped.  - US with c/f possible steal syndrome  - per vascular surgery, no concern for steal syndrome, ok to go ahead with fistulogram  - given that AVF is working well on dialysis (450 BFR) and at this time IR is only able to perform fistulograms when AVF isn't working well, will defer to OP for management.    # Persistent respiratory acidosis: VB.29/72/43/34  - unlikely to be related to fluid overload at this point as pt only got worse in spite of large volumes UF   - intubated  in setting of hypercapnia in spite of bipap, extubated   - transferred to ICU , was on bipap with improvement  - per transplant pulm, will likely need bipap at night and during day if napping    # Volume/BP: EDW 45.5 kg. Edema due to  "third spacing due to hypoalbuminemia. Anuric; on metoprolol soln 25 mg bid   - BP's were stable/hypertensive after dialysis Tues, became hypotensive Wed morning, BP's likely due to ongoing respiratory failure  - volume overload on CXR and on exam with 1+ pitting edema   - pre HD wt 49.7 kg today   - I/O not accurate as no tube feeds or TPN/lipids recorded for multiple days  - continue to chart volume of TPN/lipids in the I/O   - appreciate condensing TPN/lipids as much as possible (~1L per day)  - net neg 1600 ml yesterday with 3.5 kg UF  - tomorrow plan UF goal 3.5 kg over 4 hrs  - CXR 2/28 with slight improvement in pulm edema, O2 95% on 3L      # Nutrition: on TPN and regular diet  - per team, concern is that pt isn't absorbing much PO or tube feeds with diarrhea increasing significantly with increase in tube feeds; thus needing TPN     # Anemia 2/2 ESKD  On Venofer 50 qwk, Mircera last dose 1/9/2024  - hgb 8's  - Will continue Venofer 50 mcg q week (Tuesday)  - increase epogen dose from 4000 to 8000 units (starting 2/20)    # BMD : Ca 8-9's, phos 2.1, alb 3.1  - renvela is held, will restart if phos remains elevated  - phos is now low, will monitor and start supplement if needed           Recommendations were communicated to primary team via note and in person    RABIA Moctezuma   Division of Renal Disease and Hypertension  [phone]    Interval History :   Seen bedside, sitting up in chair and doing very well. Off bipap, on 3L O2. UF 3.5 kg yesterday, net neg 1600 ml. Plan the same for tomorrow. Per transplant pulm, will likely need bipap at night and while napping. On 3L O2. No n/v, CP, chills.    Review of Systems:   4 point ROS neg other than as noted above    Physical Exam:   I/O last 3 completed shifts:  In: 1924.22 [P.O.:300; I.V.:105; NG/GT:315]  Out: 3500 [Other:3500]   /71   Pulse 97   Temp 98.1  F (36.7  C) (Oral)   Resp 14   Ht 1.57 m (5' 1.81\")   Wt 49.7 kg (109 lb 9.1 oz)   SpO2 " 92%   BMI 20.16 kg/m       GENERAL APPEARANCE: NAD  PULM: diminished  CV: regular rhythm, normal rate, no rub     -1-2+ pitting edema lower extremities, (LUE (fistula arm) > RUE)  GI: soft   INTEGUMENT: no cyanosis, no rash  NEURO: a/o3  Access Left AVG     Labs:   All labs reviewed by me  Electrolytes/Renal -   Recent Labs   Lab Test 03/01/24  0354 03/01/24  0351 02/29/24  2322 02/29/24  1608 02/29/24  1024 02/29/24  0332 02/28/24  1101 02/28/24  0558   NA  --  136  --   --   --  139  --  139   POTASSIUM  --  3.1*  --   --   --  3.6  --  3.4   CHLORIDE  --  99  --   --   --  98  --  98   CO2  --  29  --   --   --  30*  --  32*   BUN  --  28.0*  --   --   --  70.2*  --  50.7*   CR  --  1.86*  --   --   --  3.47*  --  2.60*   * 144* 134* 191*   < > 140*   < > 279*   ESTUARDO  --  8.3*  --   --   --  8.9  --  8.5*   MAG  --  1.8  --  1.7  --  2.2   < > 1.9   PHOS  --  2.1*  --  2.2*  --  4.7*   < > 4.3    < > = values in this interval not displayed.       CBC -   Recent Labs   Lab Test 03/01/24  0351 02/29/24  1320 02/29/24  0556 02/28/24  0558   WBC 5.4  --  6.0 7.1   HGB 8.3* 7.9* 6.6* 8.2*     --  253 293       LFTs -   Recent Labs   Lab Test 03/01/24  0351 02/29/24  0332 02/28/24  0558   ALKPHOS 63 64 81   BILITOTAL 0.3 0.2 0.2   ALT 7 8 10   AST 13 13 17   PROTTOTAL 5.3* 5.2* 6.0*   ALBUMIN 3.1* 3.0* 3.4*       Iron Panel -   Recent Labs   Lab Test 09/26/22  0555 09/03/22  1039 08/24/22  0810   IRON 54 21* 41   IRONSAT 22 9* 21   CARLOS 769* 343* 334*         Imaging:  All imaging studies reviewed by me.     Current Medications:   calcium carbonate-vitamin D  1 tablet Per J Tube TID w/meals    cyanocobalamin  500 mcg Per Feeding Tube Daily    dapsone  50 mg Per J Tube Q Mon Wed Fri AM    [Held by provider] heparin ANTICOAGULANT  5,000 Units Subcutaneous Q12H    levalbuterol  1.25 mg Nebulization 2 times daily    levothyroxine  25 mcg Oral QAM AC    lidocaine  1 patch Transdermal Q24h    liothyronine  2.5  mcg Oral BID    lipids 4 oil  250 mL Intravenous Once per day on Sunday Tuesday Thursday Saturday    [Held by provider] metoprolol  25 mg Per J Tube BID    pantoprazole  40 mg Per J Tube BID    potassium & sodium phosphates  1 packet Oral BID    predniSONE  5 mg Per J Tube QAM    And    predniSONE  2.5 mg Per J Tube QPM    [Held by provider] sevelamer carbonate (RENVELA)  0.8 g Oral BID    tacrolimus  4.5 mg Per J Tube QAM    And    tacrolimus  4.5 mg Per J Tube QPM    theophylline  100 mg Oral or Feeding Tube Q8H TONY      dextrose      dextrose Stopped (02/28/24 2207)    heparin (porcine) 500 Units/hr (02/24/24 0839)    - MEDICATION INSTRUCTIONS -      - MEDICATION INSTRUCTIONS -      parenteral nutrition - ADULT compounded formula 30 mL/hr at 03/01/24 1100    - MEDICATION INSTRUCTIONS -      - MEDICATION INSTRUCTIONS -      sodium chloride 0.9%       RABIA Moctezuma

## 2024-03-01 NOTE — PROGRESS NOTES
Care Management Follow Up    Length of Stay (days): 20    Expected Discharge Date:       Concerns to be Addressed: discharge planning, other (see comments) (New TPN)     Patient plan of care discussed at interdisciplinary rounds: Yes, with Care Management team during Interdisciplinary Care Team Rounds this late morning.    Anticipated Discharge Disposition: Transitional Care, Home Care, Dialysis Services     Anticipated Discharge Services: Prior to hospitalization and per initial Care Management Consult note, the following services were in place:    [facility] [place]: Pt has chair time of 11:45 AM T/TH/Sat  Phone number: [phone]  Fax: [phone]     [facility] - [phone]     Home 02 through [employer] [facility]: Fax: [phone] Phone: [phone]  [facility] Re[facility]: Fax [phone]     Pulmonary rehab at [facility]  Fax to [phone].      [facility]: Pt has chair time of 11:45 AM T/TH/Sat  Phone number: [phone]  Fax: [phone]    Education Provided on the Discharge Plan: Yes, by the interdisciplinary team.  Patient/Family in Agreement with the Plan: yes, and final plans of care to be determined.     Additional Information:     Ms Rodriguez continues to be followed by multiple MD team members as well as, the Department of Care management to who will assist with transition plans as they are known.  The Care Management Mana POZO continues to follow patient for transition needs LTAC verses TCU. Patient has had home care services as above.    Inpatient cares continue per MD team at this time with ongoing medical work up at this time. The inpatient Care Management Team will continue to follow patient for transition planning need as recommended by the interdisciplinary team.  ___________________________    Lily Pena,  B.S.N., R.N., P.H.N.  Administrative Nurse Supervisor [facility]  Care Coordinator Nurse  [place] Today only via [place].  [facility]

## 2024-03-01 NOTE — PROGRESS NOTES
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 On 2/17/24 she was transferred to ICU for worsening mental status and acute hypoxic and hypercarbic respiratory failure inspite of BiPAP requiring intubation. She was suspected to have PNA and started on antibiotics. Extubated on 2/19 without complications and tolerated iHD on 2/20, and tolerated PO regular diet in addition to TPN. Developed progressively worsening respiratory acidosis and hypercarbia, and was re-admitted to ICU on 2/28/24 for close monitoring of intermittent BiPAP and possible intubation, however she improved on AVAPS setting and well enough to transfer back to medicine floor on 2/29/24.      Changes today:     -  Continue Bipap while sleeping (and with naps).  If mental status worsens, will use Bipap during the day too as it may indicate worsening hypercarbia  - 3L O2 for meals and breaks from Bipap  -  Discussed theophylline with Dr. Stone. Goal leel is 10-14, and since she is low will rebolus with 3mg/kg and recheck.  -  Trending VBGs BID  - Continue thyroid replacement per ICU team  recommendations, repeat thyroid function labs in one week (3/6) for reassessment      #Severe respiratory acidosis, improved on BiPAP  #Acute hypoxic and hypercarbic respiratory failure  #S/P Lung transplant 2022 c/b right hemidiaphragm palsy  #Recurrent pneumonia, resolved  Patient received a bilateral lung transplant 6/28/22 for COPD. Was admitted 2/10 to address malnutrition and admitted to ICU on 2/17 with hypoxic hypercarbic respiratory failure. Intubated on 2/18 AM  due to worsening oxygen requirements, likely due to pulmonary edema given hx of ESRD requiring HD vs infection given hx of lung transplant, immunosuppressed status, and recurrent pneumonias. Extubated on 2/19 without complications,   Micro Follow-up on BAL, viral panel, CMV, fungus, and Blood Cultures show no abnormalities. Does have slowly rising pCO2 on VBG - HFNC did not seem to improve elevated pCO2 and instead seemed to potentially cause worsening, possibly due to depressed respiratory drive from high O2. Patient initially was adamant that she would not tolerate BiPAP, however was agreeable to trial on 2/27 PM. Despite intermittent BiPAP overnight, VBG was worse and patient transferred to ICU on 2/28 AM. RT adjusted BiPAP settings while in ICU to AVAPS with improvement in mental status and VBG, and patient transferred back to medicine floor on 2/29. ICU team additionally added theophylline and thyroid replacement as both can help with diaphragmatic/respiratory muscle weakness in setting of COPD and malnutrition. MRI pending for evaluation of possible central cause of decreased ventilatory drive.   - discussed with transplant pulmonary team - once mentally clear, would like to evaluate GOC with palliative care team (expect to consult on Friday 3/1) given concern that TPN (which is her main source of nutrition) is causing hypervolemia that may require daily dialysis to manage volume, and may also be contributing to her respiratory acidosis  -  neurology consulted to assist with evaluation for possible central etiology of hypercapnic respiratory failure - MRI ordered, prn for claustrophobia ordered   - discontinued sodium bicarb  - PRN hypertonic saline inhaler with albuterol nebs  - Continue good pulm toilet  - Transplant pulmonology following, recs appreciated  - PTA immunosuppressive agent  >Prednisone 5 mg every morning, 2.5 mg every afternoon; tacrolimus 4 mg every morning, 4mg every afternoon  > Monitor tacro levels, goal 7-9  > - overnight oximetry study suggestive of O2 vs CPAP need, as did require up to 2LPM overnight to prevent hypoxia  > Try to minimize O2 to preserve respiratory drive, will give IVIG for DSA+   - avoid HFNC, maintain minimum oxygen to keep SaO2 >85% - pending recs from transplant pulm  - PTA dapsone MWF for PJP prophylaxis  - completed course of zosyn for empiric HAP course (2/17 - 2-23)  - Initial decompensation likely from opioids - limit medications that would depress respiration   - Continue theophylline with goal level of 10-14   - Re-bolusing today per Dr. Stone with 3mg/kg   - Continue scheduled theophylline   - Follow up theophylline level on 3/2  - port culture per transplant ID rec- NGTD  - awaiting metabolic CART study results        Antibiotics:     Vancomycin (2/17 - 2/17)  Zosyn (2/17 - 2/23)  Dapsone MWF, PJP ppx   Micafungin (2/18- 2/21)     Immunosuppression  Tacrolimus  Prednisone       #Severe malnutrition   #FTT   #Hypoalbuminemia  #Gastroparesis, small bowel dysmotility  #S/P PEG/J with intolerance of enteral nutrition  # Chronic osmotic diarrhea/SIBO s/p Rifaximin > improving     Patient with gastroparesis (presumed due to vagal injury) and small bowel dysmotility complicated by unintentional 40lb weight loss over the past year and now severe malnutrition. Previously intolerant to oral food intake due to nausea. Was initially admitted for portacath and TPN initiation since 2/13. After extubation has been  able to tolerate feeds without n/v from 2/20. Electrolytes trending towards baseline today.  Per GI, next steps for workup for malnutrition would include CT enterography to evaluate for anatomic abnormalities contributing to malnutrition vs possible treatment for SIBO (though patient has had multiple courses of treatment in the past with no long term improvement). Patient couldn't tolerate the oral load for CT enterography on 2/21, so will defer this until she is able to tolerate greater PO load. On 2/23 , again discussed with patient the need of small bowel motility study if not improving outpatient through [facility]. No ongoing concerns for SIBO on 2/23 as patient is passing multiple episodes of stools and gas with no abdominal pain or distention. C-diff test negative on 2/21. Per RD, patient tolerating >300 kcal of intake PO currently, so stopped trickle Tube feeds and continuing with PO and TPN for nutrition.   - Regular diet + increased TPN +  no further tube feeds per RD & transplant pul discussion               - Nephrology: limit fluid < 1.5 L per day for TPN ( chart vol of TPN in the I/O)               - RD concerned pt is not absorbing any oral intake and they will be monitoring Bowel function, I/O and, PO/TF/TPN intake.               -  discussed with tx pulm team - stopped TF as PO intake sufficient for preventing gut atrophy (>300 kcal per day)  - Continue calorie count  - CT enterography with contrast- Pt not able to tolerate oral contrast load of 1500 ml on 2/21; no ongoing concerns for SIBO, would need small bowel motility study if not improving outpatient through [facility]  - Daily Weights  - monitor CMP in the AM      #Acute on chronic Anemia 2/2 ESRD  Hgb have been stable 7-8s, with no acute signs of bleeding, acute drop 2/29 from 8.2 > 6.6 after two rechecks, no overt signs of bleeding noted, patient reports some abdominal pain but otherwise physical exam unremarkable.  LUE US negative for DVT 2/18.    -  continue epogen per nephrology with dialysis  - venofer 50 mcg qweek with dialysis  - type and screen performed 2/29, 1 unit pRBCs ordered and transfused  - will recheck Hgb 1 hour after completing transfusion      #ESRD on HD T/Th/Sat  #Hypervolemic hyponatremia  #Anion gap metabolic acidosis - resolved  Patient is ESRD on T/Th/Sat HD, Hgb stabilizing. HD tolerating well. Continuing monitoring electrolytes daily. Anion gap normal since 2/21. Will continue to monitor daily labs/ABG  for refeeding syndrome and acidosis  - Continue Venofer injections    - CBC and CMP daily  - Continue epogen dose 8000 units as per nephrology  - Strict I/Os  - HD T/TH/Sat per nephrology vs daily UF per nephrology       # Elevated TSH and low T4 and T3  Patient with new low T4 at 0.64 and TSH at 6.5, concerning for new hypothyroidism vs sick thyroid syndrome. TSH with appropriate response, more consistent with elevation in the setting of acute illness. ICU team on 2/28 recommended initiation of treatment for possible hypothyroid as this can improve respiratory muscle function in the setting of weakness and malnutrition.   - continue liothyronine and levothyroxine per ICU team recommendations  - repeat thyroid function studies in one week, adjust dosing as needed      #Left upper extremity unilateral edema, improving   #Bilateral pedal and ankle edema, improving  Edema due to third spacing due to hypoalbuminemia vs HF. BNP >94998 at admission, Echo on 2/18 shows EF of 55-60% with IVC of <2.1 and collapsing >50% with sniff, normal RA pressure, no significant valvular abnormalities ;  Left upper extremity swelling and  pitting lower extremity edema likely due to hypoalbuminemia improved today. Upper limb duplex USG on 2/18 negative for DVT. Wt went from 43.4 kg on admission to 47.4 kg today. She is urinating but cannot chart as she usually urinates with BM. She reports trending to baseline with urination. She denies dysuria, retention  symptoms. USG left arm on 2/21 shows steel physiology and Vascular Surgery consulted. Vascular surgery has only minor concerns for steal symptoms and given that the AVF is working well, they are okay with outpatient fistulograms/ venoplasty with wrist brachial index and PPG's, unless new concerns arise. LUE and LE edema significantly decreased since yesterdays HD. No new tingling/ numbness noticed.  - Continue daily weights  - Strict I/Os  - Elevation and wrapping of only lower legs as needed and increased UF per nephrology for volume management; no wrapping of Left upper extremity with dialysis fistula       #Steal physiology of LUE dialysis access fistula  LUE arterial US obtained 2/21 with concern for LUE edema. US of fistula demonstrated steal physiology. Discussed with nephrology, and vascular surgery consulted on 2/22. Vascular surgery has only minor concerns for steal symptoms and given that the AVF is working well, they are okay with outpatient fistulograms/ venoplasty with wrist brachial index and PPG's, unless new concerns arise.  - plan for outpatient workup as above; nephrology in agreement with outpatient workup.       #Facial and neck bruising  #Pain  Reports soreness in her neck from lying in bed. No soreness in the buttocks/ back. Patient has multiple bruises over her arms and face which is attributed to previous falls. No new bruising reported today. Patients reports her headaches are improving with tylenol. Using heating pads and lidocaine patch for body pains. Couple of small skin tears noted by the Rn's. Skin care done accordingly.  - Tylenol Q4  - Lidocaine patch prn  - Heating pads prn  - Diligent skin cares       #HTN  #A-fib, resolved  Noted atrial fibrillation on arrival with HR elevated at 150, not sustained for > 10 minutes and otherwise hemodynamically stable. Rates stable in the 80's. BP normalizing since 2/22.  - PTA metoprolol 25mg BID - holding for now with lower BP after transfer to  ICU and with increased UF per nephrology, resume if becoming more HTN  - Continuous telemetry       # Encephalopathy secondary to hypercarbia - resolved  Patient was progressively more lethargic on 2/17 during admission in [facility]. Etiology likely secondary to hypercarbia in context of respiratory failure as patient was noted to have high CO2s concomitant with lethargy. Though receiving oxycodone and fentanyl, lethargy was only partially alleviated by narcan. LFTs and ammonia wnl with low suspicion of hepatic encephalopathy. BUN wnl with low suspicion for uremic encephalopathy. Head CT on 2/18 was negative with low suspicion of acute intracranial pathology. Patient is awake, alert, oriented and following commands since 2/20.        # Right hip fracture s/p ORIF (December 2023)   - PT/OT       # GERD  - PTA PPI       # Hx EBV viremia   # Hypogammaglobulinemia  # Chronic immunosuppression 2/2 lung transplant  Patient has been afebrile and has not had leukocytosis however she is under immunosuppression. Given acute respiratory failure and hx of recurrent pneumonias, she was started on empiric abx for concerns over new pneumonia. RVP and cultures no growth to date. Last day of empirical treatment for HAP.  - EBV 27K (decreased compared to prior)         Lines/tubes/drains:  - CVC RIJ (for TPN, is tunneled line)   - PIV x2  - PEG/J  - HD AV fistula, left arm         Diet:   Regular diet PO  Holding trickle tube feeds if persistent PO intake >300 kcal/day  Majority of nutrition through TPN per RD     General Cares/Prophylaxis:    DVT Prophylaxis: Heparin subcutaneous - holding with need for transfusion on 2/29, will monitor Hgb for signs of bleeding before resuming  GI Prophylaxis: PPI  Fluids: As per TPN  Code Status: Full        Diet: Full Liquid Diet  parenteral nutrition - ADULT compounded formula    DVT Prophylaxis: holding subQ heparin for now while monitoring after transfusion 2/29  Dong Catheter: Not  present  Fluids: Per TPN and PO  Lines: PRESENT      CVC Single Lumen Right Internal jugular Non - valved (open ended);Tunneled;Power injectable-Site Assessment: WDL  Hemodialysis Vascular Access Arteriovenous graft Superior Arm-Site Assessment: WDL      Cardiac Monitoring: ACTIVE order. Indication: ICU  Code Status: Full Code      Clinically Significant Risk Factors        # Hypokalemia: Lowest K = 3.1 mmol/L in last 2 days, will replace as needed       # Hypoalbuminemia: Lowest albumin = 2.6 g/dL at 2/18/2024  5:13 AM, will monitor as appropriate             # Severe Malnutrition: based on nutrition assessment      # Financial/Environmental Concerns: none         Disposition Plan         The patient's care was discussed with the Bedside Nurse, Patient, and Transplant Pulm Consultant(s).    Susan Delacruz MD  Internal Medicine Hospitalist  [phone]    [address]  Securely message with [employer] (more info)  Text page via [facility] Paging/Directory   See signed in provider for up to date coverage information  ______________________________________________________________________    Interval History   Feeling tired but thinking clearly this morning.  No dyspnea, no pain, no nausea.    Physical Exam   Vital Signs: Temp: 99.2  F (37.3  C) Temp src: Axillary BP: 123/70 Pulse: 94   Resp: 13 SpO2: 97 % O2 Device: Nasal cannula Oxygen Delivery: 3 LPM  Weight: 109 lbs 9.1 oz    General: thin, alert and fully oriented, sitting up in a chair with NC on, able to fully participate in conversation  HEENT: Normocephalic, bruising on the right face around right orbit with hematoma and right neck, improved  Neuro: very awake and alert, fully oriented, following commands, answering questions clearly and easily, moving all extremities  Pulm/Resp: breathing on NC, diminished lung sounds in bases bilaterally, no increased work of breathing  CV: RRR, warm extremities,  1+ pitting edema in left hand, no edema in right arm or hand, 1+ pitting edema in the right lower leg  Abdomen: Non-distended, PEG in place, non-tender  Incisions/Skin: Bruising to face and right forearm.     Medical Decision Making       Please see A&P for additional details of medical decision making.      Data     I have personally reviewed the following data over the past 24 hrs:    5.4  \   8.3 (L)   / 240     136 99 28.0 (H) /  145 (H)   3.1 (L) 29 1.86 (H) \       ALT: 7 AST: 13 AP: 63 TBILI: 0.3   ALB: 3.1 (L) TOT PROTEIN: 5.3 (L) LIPASE: N/A       Imaging results reviewed over the past 24 hrs:   No results found for this or any previous visit (from the past 24 hour(s)).

## 2024-03-01 NOTE — PROGRESS NOTES
Pulmonary Medicine  Cystic Fibrosis - Lung Transplant Team  Progress Note  2024     Patient: Sofie Rodriguez  MRN: [id number]  : 1960 (age 63 year old)  Transplant: 2022 (Lung), POD#612  Admission date: 2/10/2024    Assessment & Plan:     Sofie Rodriguez is a 63 year old female with h/o COPD s/p BSLT () with course complicated by post-operative hemidiaphragm palsy, recurrent PNAs, positive DSA, EBV viremia, hypogammaglobulinemia, severe gastroparesis s/p G/J tube placement (22) and pyloric botox (23), GI bleed / pyloric ulcer, hemobilia s/p ERCP and MRCP, chronic diarrhea, recurrent C diff colitis, ESRD on iHD, recurrent falls with injuries (recent right hip fx s/p ORIF 2023), deconditioning, and FTT.  Admitted 2/10 for initiation of TPN/lipids.  Transferred to ICU  for emergent intubation for hypoxic and hypercapneic respiratory failure with severe respiratory acidosis and encephalopathy.  iHD increased, infectious workup unremarkable.  Extubated .  Continues with persistent and progressive hypercapnia with severe acidosis, returned to ICU - d/t tenuous respiratory status.  Improvement noted with increase in tolerance of NIPPV, daytime weaning trials initiated , monitoring tolerance.     Today's recommendations:  - Volume management per nephrology  - AVAPS with  ml, trial off during the day as tolerated (on NC vs HFNC) and continuous overnight, VBG q12h for assessment on and off NIPPV (ordered)  - Defer trach consideration pending assessment of hypercapnia management with only nocturnal NIPPV and pt. tolerance of ordered nocturnal therapy  - MRI brain per neurology when able  - Tacrolimus level ordered 3/2  - Repeat DSA due 3/6  - Repeat Prospera and EBV due 3/18  - TF remains on hold, consider resuming post-pyloric feeds when appropriate (even if only at trickle rate)  - TPN/lipids per primary  - CT enterography recommended per GI, but unlikely to be  able to tolerate the required 1.5 L enteral contrast bolus so deferring for now     Acute on chronic hypoxic/hypercapneic respiratory failure:  S/p bilateral sequential lung transplant for COPD:  Right hemidiaphragm palsy:   Hypoventilation, Suspected CARLEE: CT PTA (2/7) with decreased MARLON opacities but new tree in bud RLL opacities.  PFTs unchanged in clinic (but ATS criteria not met), remain significantly below her baseline.  Baseline hypoxia with 2L NC overnight.  Respiratory decompensation with encephalopathy and severe respiratory acidosis on 2/17.  S/p intubation 2/17-2/19.  Repeat CT (2/17) with new patchy consolidative and nodular opacities, GGO primarily in the bases and intralobular septal thickening (concerning for pulmonary edema given recent addition of TPN nutrition, new infection, PTLD, and/or septic emboli.  Bronch with lavage of RML (2/18) with very friable tissue, cutlures only with C. glabrata.  S/p empiric Zosyn (2/17-2/24) and micafungin (2/18-2/21).  Severe respiratory acidosis with hypercapnia persisting with slight improvement with NIPPV treatment, limited by pt. tolerance to pressure and mask (claustrophobia).  Persistent respiratory failure and variable encephalopathy also complicated by diaphragmatic weakness, hypoventilation, and deconditioning d/t malnutrition.  Repeat CT (2/26) with diffuse GG and consolidative opacities >BLL and diffuse interlobular septal thickening.  Intermittent tolerance of NIPPV persists, reports some improvement in comfort with transition from BiPAP to AVAPS this morning but pH still ~7.1 with pCO2 ~100 on VBG.  Neurology consulted 2/27.  Procal increased to 1.56 (2/28).  Transferred to ICU 2/28 given tenuous respiratory status and potential need for reintubation.  Improvement noted with continuous NIPPV with minimal interruptions over the past 24h, VBG 7.28/71 (sodium bicarb also stopped, likely partially contributing).  - Blood cultures (2/28) NGTD  - Sputum  cultures ordered  - Histoplasma Ag (2/26) pending (CrAg negative)  - Volume management per nephrology  - Xopenex BID (2/27)  - Defer ABX at this time  - AVAPS with  ml, trial off during the day as tolerated (on NC vs HFNC) and continuous overnight, VBG q12h for assessment on and off NIPPV (ordered)  - Defer trach consideration pending assessment of hypercapnia management with only nocturnal NIPPV and pt. tolerance of ordered nocturnal therapy  - MRI brain per neurology when able  - Sleep clinic eval indicated as OP     Immunosuppression:  ImmuKnow low at 108 on 1/10.  AZA previously stopped 5/2023 d/t low ImmuKnow assay and EBV viremia.  Repeat ImmuKnow (2/27) low at 88, defer dose adjustment (tacro goal already decreased one day prior).  - Tacrolimus 4.5 mg BID (increased 2/28).  Goal level 6-8 (decreased 2/26 d/t ImmuKnow and concern for infection).  Repeat level ordered 3/2.  - Prednisone 5 mg qAM / 2.5 mg qPM     Prophylaxis:   - Dapsone 50 mg q MWF for PJP ppx  - CMV ppx not currently indicated, D+/R+, CMV negative 2/19 (BAL very mildly positive 2/18, likely not clinically significant)     Positive DSA: PFTs and pulmonary symptoms have remained stable as OP, so AMR treatment deferred given frailty.  Most recent DSA (2/7) with DQB2 mfi 6966 (from 5723 one month prior).  Most recent cell-free DNA Prospera (2/18) mildly elevated at 1.04 (concerning for possible rejection), which was increased from prior level of 0.12 (1/10).  IST increase deferred at that time per Dr. Gong.  S/p IVIG (2/27) for DSA (IgG WNL).  - Repeat DSA due 3/6  - Repeat Prospera due 3/18      EBV viremia: CT CAP (2/7) without lymphadenopathy.  Most recent level (2/18) improved to 28k from 96k (2/7)  - Repeat EBV due 3/18     Other relevant problems being managed by the primary team:      FTT:  Severe protein calorie malnutrition:  Gastroparesis s/p PEG/J, botox, and G-POEM:  SB hypomotility:  Pyloric ulcer:  Chronic nausea and osmotic  diarrhea:  SIBO s/p rifaximin:   Recurrent C diff colitis: Chronic osmotic and infectious diarrhea since transplant with recurrent episodes of C diff.  Notable weight loss (40# in a year) d/t diarrhea, GI dysmotility, and intolerance of enteral feeds (PEG/J tube in place), most recently on elemental formula.  Extensive OP eval and f/u with GI.  S/p port placement for TPN and lipids.  Tolerating modest PO intake, monitoring for refeeding syndrome.    - TF remains on hold, consider resuming post-pyloric feeds when appropriate (even if only at trickle rate)  - TPN/lipids per primary  - CT enterography recommended per GI, but unlikely to be able to tolerate the required 1.5 L enteral contrast bolus so deferring for now     ESRD: CT scan (with declining respiratory status) with volume overload secondary to TPN volume, iHD increased from PTA TThSa schedule with unsustained improvement.  Management per nephrology     We appreciate the excellent care provided by the [place] [id number] team.  Recommendations communicated via in person rounding and this note.  Will continue to follow along closely, please do not hesitate to call with any questions or concerns.     Patient discussed with Dr. Gong.    Catherine Johnson, DNP, APRN, CNP  Inpatient Nurse Practitioner  Pulmonary CF/Transplant     Subjective & Interval History:     Tolerated intermittent periods of time off AVAPS yesterday with stable VBG, AVAPS returned overnight and did not remove until 0730.  Reports feeling a little air hunger at the end of AVAPS therapy this morning.  No new cough or sputum.      Review of Systems:     C: No fever, no chills, no change in weight  INTEGUMENTARY/SKIN: No rash or obvious new lesions  ENT/MOUTH: No nasal congestion or drainage  RESP: See interval history  CV: No chest pain, no palpitations, + peripheral edema, no orthopnea  GI: + Intermittent nausea, no vomiting, + loose stools, no reflux symptoms  : No dysuria  MUSCULOSKELETAL: No  "myalgias, no arthralgias  ENDOCRINE: Blood sugars with adequate control  NEURO: No headache, no numbness or tingling  PSYCHIATRIC: Mood stable    Physical Exam:     All notes, images, and labs from past 24 hours (at minimum) were reviewed.    Vital signs:  Temp: 99.2  F (37.3  C) Temp src: Axillary BP: 123/70 Pulse: 94   Resp: 13 SpO2: 97 % O2 Device: Nasal cannula Oxygen Delivery: 3 LPM Height: 157 cm (5' 1.81\") Weight: 49.7 kg (109 lb 9.1 oz)  I/O:   Intake/Output Summary (Last 24 hours) at 3/1/2024 0705  Last data filed at 3/1/2024 0600  Gross per 24 hour   Intake 1829.22 ml   Output 3500 ml   Net -1670.78 ml     Constitutional: Sitting up in a chair, thin and frail appearing, in no apparent distress.   HEENT: Eyes with pink conjunctivae, anicteric.  Oral mucosa moist without lesions.   PULM: Good air flow bilaterally.  No crackles, no rhonchi, no wheezes.  Non-labored breathing on 3L NC.  CV: Normal S1 and S2.  Tachycardia.  No murmur, gallop, or rub.  2+ pitting BLE edema.   ABD: NABS, soft, nontender, nondistended.   PEG/J tube in place.  MSK: Moves all extremities.  + muscle wasting.   NEURO: Alert and conversant.   SKIN: Warm, dry, fragile, diffuse scattered ecchymosis.    PSYCH: Mood stable.     Data:     LABS    CMP:   Recent Labs   Lab 03/01/24  0354 03/01/24  0351 02/29/24  2322 02/29/24  1608 02/29/24  1024 02/29/24  0332 02/28/24  1710 02/28/24  1558 02/28/24  1101 02/28/24  0558 02/27/24  1622 02/27/24  0556   NA  --  136  --   --   --  139  --   --   --  139  --  137   POTASSIUM  --  3.1*  --   --   --  3.6  --   --   --  3.4  --  4.1   CHLORIDE  --  99  --   --   --  98  --   --   --  98  --  103   CO2  --  29  --   --   --  30*  --   --   --  32*  --  23   ANIONGAP  --  8  --   --   --  11  --   --   --  9  --  11   * 144* 134* 191*   < > 140*   < >  --    < > 279*  --  152*   BUN  --  28.0*  --   --   --  70.2*  --   --   --  50.7*  --  70.2*   CR  --  1.86*  --   --   --  3.47*  --   --   " "--  2.60*  --  4.42*   GFRESTIMATED  --  30*  --   --   --  14*  --   --   --  20*  --  11*   ESTUARDO  --  8.3*  --   --   --  8.9  --   --   --  8.5*  --  9.3   MAG  --  1.8  --  1.7  --  2.2  --  2.0  --  1.9   < > 2.5*   PHOS  --  2.1*  --  2.2*  --  4.7*  --  4.6*  --  4.3   < > 5.2*   PROTTOTAL  --  5.3*  --   --   --  5.2*  --   --   --  6.0*  --  5.6*   ALBUMIN  --  3.1*  --   --   --  3.0*  --   --   --  3.4*  --  3.3*   BILITOTAL  --  0.3  --   --   --  0.2  --   --   --  0.2  --  <0.2   ALKPHOS  --  63  --   --   --  64  --   --   --  81  --  88   AST  --  13  --   --   --  13  --   --   --  17  --  21   ALT  --  7  --   --   --  8  --   --   --  10  --  16    < > = values in this interval not displayed.     CBC:   Recent Labs   Lab 03/01/24  0351 02/29/24  1320 02/29/24  0556 02/28/24  0558 02/27/24  0756   WBC 5.4  --  6.0 7.1 8.0   RBC 2.32*  --  1.79* 2.14* 2.14*   HGB 8.3* 7.9* 6.6* 8.2* 7.9*   HCT 26.9* 25.4* 22.3* 28.1* 27.4*   *  --  125* 131* 128*   MCH 35.8*  --  36.9* 38.3* 36.9*   MCHC 30.9*  --  29.6* 29.2* 28.8*   RDW 23.7*  --  20.0* 20.6* 20.6*     --  253 293 283       INR:   Recent Labs   Lab 02/29/24  0332 02/26/24  0611   INR 1.09 0.97       Glucose:   Recent Labs   Lab 03/01/24  0354 03/01/24  0351 02/29/24  2322 02/29/24  1608 02/29/24  1024 02/29/24  0332   * 144* 134* 191* 118* 140*       Blood Gas:   Recent Labs   Lab 03/01/24  0351 02/29/24  2159 02/29/24  1608   PHV 7.29* 7.32 7.24*   PCO2V 72* 66* 75*   PO2V 43 36 51*   HCO3V 34* 34* 32*   LINDY 6.3* 6.5* 3.7*   O2PER 35 35 32       Culture Data No results for input(s): \"CULT\" in the last 168 hours.    Virology Data:   Lab Results   Component Value Date    FLUAH1 Not Detected 02/18/2024    FLUAH3 Not Detected 02/18/2024    QD0384 Not Detected 02/18/2024    IFLUB Not Detected 02/18/2024    RSVA Not Detected 02/18/2024    RSVB Not Detected 02/18/2024    PIV1 Not Detected 02/18/2024    PIV2 Not Detected 02/18/2024    " PIV3 Not Detected 02/18/2024    HMPV Not Detected 02/18/2024       Historical CMV results (last 3 of prior testing):  Lab Results   Component Value Date    CMVQNT Not Detected 02/19/2024    CMVQNT Not Detected 02/07/2024    CMVQNT Not Detected 01/10/2024     Lab Results   Component Value Date    CMVLOG 3.2 07/12/2023    CMVLOG <2.1 04/19/2023    CMVLOG 3.5 01/25/2023       Urine Studies    Recent Labs   Lab Test 02/18/24  0222 05/18/23  0627   URINEPH 7.5* 5.0   NITRITE Negative Negative   LEUKEST Trace* Moderate*   WBCU 66* 21*       Most Recent Breeze Pulmonary Function Testing (FVC/FEV1 only)  FVC-Pre   Date Value Ref Range Status   02/07/2024 1.19 L    01/10/2024 1.12 L    08/29/2023 1.48 L    07/25/2023 1.55 L      FVC-%Pred-Pre   Date Value Ref Range Status   02/07/2024 42 %    01/10/2024 39 %    08/29/2023 53 %    07/25/2023 55 %      FEV1-Pre   Date Value Ref Range Status   02/07/2024 1.13 L    01/10/2024 1.10 L    08/29/2023 1.43 L    07/25/2023 1.54 L      FEV1-%Pred-Pre   Date Value Ref Range Status   02/07/2024 51 %    01/10/2024 49 %    08/29/2023 64 %    07/25/2023 69 %        IMAGING    Recent Results (from the past 48 hour(s))   XR Chest Port 1 View    Narrative    Portable chest    INDICATION: Persistent respiratory failure    COMPARISON: CT 2/26/2024. Portable plain film also 2/26/2024    FINDINGS: Heart upper normal size. Atherosclerotic calcification at  the aortic knob. Clamshell sternotomy from prior bilateral lung  transplant again noted. Mildly prominent pulmonary artery convexity of  the left upper mediastinal border suggesting pulmonary artery  enlargement which is also evident on the recent CT, this could  indicate pulmonary hypertension as well. A right sided single-lumen  central venous catheter tip is in the right atrium. Left costophrenic  angle blunting is unchanged. Scattered patchy opacities in the lungs  appears slightly improved bilaterally. No ectopic air.      Impression     IMPRESSION: Continued appearance of bilateral lung transplant slight  improvement of edema. Continued left pleural effusion. Continued mild  pulmonary artery enlargement suggesting pulmonary hypertension.  Atherosclerosis.    ALVINA HARRY MD         SYSTEM ID:  [id number]

## 2024-03-01 NOTE — CONSULTS
"Palliative Care Consultation Note  [facility]      Patient: Sofie Rodriguez  Date of Admission:  2/10/2024    Requesting Clinician / Team: Medicine  Reason for consult: Goals of care  Decisional support       Recommendations & Counseling     GOALS OF CARE:   Restorative without limits. Sofie noted that she would want to do whatever she needs at this time to try and improve her clinical status.    She is okay with her prolonged hospitilization at this time. She also noted that she would want to remain full code. We discussed how anxious she was when using the BiPAP previously and she agreed that yes it makes her quite anxious but she would want to use it again if it could save her life. She did note that earlier in her hospital stay that she said she was unsure if she could continue to go through aggressive restorative cares. We revisited this and she said that she feels that she can and would want to continue with full cares.  We discussed code status and she said that she would want to have CPR and intubation performed. She did say that it would be important for her to \"have her mind\" and be able to interact with friends and family in a meaningful way for her.   We will continue to discuss this with Sofie as I worry that CPR would very likely not lead to an outcome that would be acceptable to her    ADVANCE CARE PLANNING:  No HCD on file, Sofie noted that her daughter, Charity, should be her surrogate decision maker if she were unable to make decisions for herself   There is no POLST form on file,  recommend continued medical treatment and FULL CODE   Code status: Full Code    MEDICAL MANAGEMENT:   We are not actively managing symptoms at this time.    PSYCHOSOCIAL/SPIRITUAL SUPPORT:  Sofie finds fidel mostly in her friends and family. She lives in [place] [place] in a house with one of her daughters and three of her grandchildren. She has two other adult children as well and four " other grandchildren as well.  [place] but not involved with the [facility]    Palliative Care will continue to follow. Thank you for the consult and allowing us to aid in the care of Sofie Rodriguez.    These recommendations have been discussed with the primary team .    Asher Vargas MD  Securely message with Bird[employer] (more info)  Text page via [facility] Paging/Directory       Assessment      Sofie Rodriguez is a 63 year old female with PMH COPD s/p bilateral lung transplant 6/28/22, hemidiaphragm palsy, PEG dependence, ESRD on HD, and SIBO    Initially admitted on 2/10/24 with worsening malnutrition requiring TPN initiation. Her hospital course has been complicated by worsening CO2 retention, pulmonary edema, and AMS requiring BiPAP and intubation.    She is now extubated, improving and transferred out of the ICU.    Today, the patient was seen for:  [facility]       Palliative Care Summary:   Met with Sofie in the ICU.   Introduced the role of palliative care as an interdisciplinary team that cares for patients with serious illness to help support symptom management, communication, coping for patients and their families as well as support with medical decision making.    She expressed that she has had a challenging hospitalization but that she has had off and on confusion through a lot of it. Prior to hospitalization, Sofie reports a good quality of life. She lives with her daughter and three of her grandchildren and enjoys her time with them, other family, and friends.     She has been on HD for about a year and a half and spoke about not enjoying it but not being too terribly bothered by it either. (See above for further information regarding code status and [facility])    She is hopeful to recover to a state to where she could return home to [facility].    Prognosis, Goals, & Planning:    Functional Status just prior to this current hospitalization:  Living with her daughter fairly independently. Independent for all  ADLs    Prognosis, Goals, and/or Advance Care Planning:  We discussed general treatment options (full/restorative, selective/conservatives, and comfort only/hospice). We then discussed how these specifically apply to Sofie.  Based on this discussion, Sofie has decided to remain full code with the plan for full cares    Code Status was addressed today:   Yes, We discussed potential risks and rationale of attempting cardiac resuscitation, intubation, and mechanical ventilation.  We also discussed probability of survival as well as quality of life implications.  Based on this discussion, patient or surrogate response/decision: Full code (will continue to assess)      Patient's decision making preferences: independently or inconjunction with her children        Patient has decision-making capacity today for complex decisions:Intact            Coping, Meaning, & Spirituality:   Mood, coping, and/or meaning in the context of serious illness were addressed today: Yes    Social:   Living situation:lives with family  Important relationships/caregivers:Children, grandchildren, and friends  Areas of fulfillment/fidel: Spending time with loved ones  Contributing stressors (financial, substance abuse, relationship concerns, etc.)  Hx of tobacco and methamphetamine use    Medications:  I have reviewed this patient's medication profile and medications from this hospitalization.      ROS:  Comprehensive ROS is reviewed and is negative except as here & per HPI:     Physical Exam   Vital Signs with Ranges  Temp:  [97.4  F (36.3  C)-99.2  F (37.3  C)] 98.4  F (36.9  C)  Pulse:  [] 106  Resp:  [13-28] 22  BP: (105-150)/(59-97) 150/70  FiO2 (%):  [35 %] 35 %  SpO2:  [61 %-100 %] 96 %  109 lbs 9.1 oz    Very thin, middle aged woman laying in bed. Appears comfortable, Scattered ecchymosis of varying ages across her face, neck and upper extremities    Data reviewed:  Recent Labs   Lab 03/01/24  0354 03/01/24  0351 02/29/24  1676  02/29/24  1608 02/29/24  1320 02/29/24  1024 02/29/24  0556 02/29/24  0332 02/28/24  1101 02/28/24  0558 02/27/24  0556 02/26/24  0611   WBC  --  5.4  --   --   --   --  6.0  --   --  7.1   < > 6.9   HGB  --  8.3*  --   --  7.9*  --  6.6*  --   --  8.2*   < > 8.6*   MCV  --  116*  --   --   --   --  125*  --   --  131*   < > 130*   PLT  --  240  --   --   --   --  253  --   --  293   < > 288   INR  --   --   --   --   --   --   --  1.09  --   --   --  0.97   NA  --  136  --   --   --   --   --  139  --  139   < > 139   POTASSIUM  --  3.1*  --   --   --   --   --  3.6  --  3.4   < > 4.2   CHLORIDE  --  99  --   --   --   --   --  98  --  98   < > 103   CO2  --  29  --   --   --   --   --  30*  --  32*   < > 26   BUN  --  28.0*  --   --   --   --   --  70.2*  --  50.7*   < > 53.2*   CR  --  1.86*  --   --   --   --   --  3.47*  --  2.60*   < > 3.55*   ANIONGAP  --  8  --   --   --   --   --  11  --  9   < > 10   ESTUARDO  --  8.3*  --   --   --   --   --  8.9  --  8.5*   < > 9.0   * 144* 134*   < >  --    < >  --  140*   < > 279*   < > 159*   ALBUMIN  --  3.1*  --   --   --   --   --  3.0*  --  3.4*   < > 3.3*   PROTTOTAL  --  5.3*  --   --   --   --   --  5.2*  --  6.0*   < > 5.4*   BILITOTAL  --  0.3  --   --   --   --   --  0.2  --  0.2   < > <0.2   ALKPHOS  --  63  --   --   --   --   --  64  --  81   < > 78   ALT  --  7  --   --   --   --   --  8  --  10   < > 13   AST  --  13  --   --   --   --   --  13  --  17   < > 19    < > = values in this interval not displayed.

## 2024-03-02 ENCOUNTER — APPOINTMENT (OUTPATIENT)
Dept: OCCUPATIONAL THERAPY | Facility: CLINIC | Age: 64
DRG: 640 | End: 2024-03-02
Attending: INTERNAL MEDICINE
Payer: MEDICARE

## 2024-03-02 LAB
ALBUMIN SERPL BCG-MCNC: 3.1 G/DL (ref 3.5–5.2)
ALP SERPL-CCNC: 70 U/L (ref 40–150)
ALT SERPL W P-5'-P-CCNC: <5 U/L (ref 0–50)
ANION GAP SERPL CALCULATED.3IONS-SCNC: 10 MMOL/L (ref 7–15)
AST SERPL W P-5'-P-CCNC: 16 U/L (ref 0–45)
BASE EXCESS BLDV CALC-SCNC: 3.2 MMOL/L (ref -3–3)
BASE EXCESS BLDV CALC-SCNC: 5.8 MMOL/L (ref -3–3)
BASOPHILS # BLD AUTO: ABNORMAL 10*3/UL
BASOPHILS # BLD MANUAL: 0 10E3/UL (ref 0–0.2)
BASOPHILS NFR BLD AUTO: ABNORMAL %
BASOPHILS NFR BLD MANUAL: 1 %
BILIRUB SERPL-MCNC: 0.2 MG/DL
BUN SERPL-MCNC: 46.9 MG/DL (ref 8–23)
CALCIUM SERPL-MCNC: 8.9 MG/DL (ref 8.8–10.2)
CHLORIDE SERPL-SCNC: 101 MMOL/L (ref 98–107)
CREAT SERPL-MCNC: 3.01 MG/DL (ref 0.51–0.95)
DEPRECATED HCO3 PLAS-SCNC: 27 MMOL/L (ref 22–29)
EGFRCR SERPLBLD CKD-EPI 2021: 17 ML/MIN/1.73M2
EOSINOPHIL # BLD AUTO: ABNORMAL 10*3/UL
EOSINOPHIL # BLD MANUAL: 0.2 10E3/UL (ref 0–0.7)
EOSINOPHIL NFR BLD AUTO: ABNORMAL %
EOSINOPHIL NFR BLD MANUAL: 4 %
ERYTHROCYTE [DISTWIDTH] IN BLOOD BY AUTOMATED COUNT: 22.8 % (ref 10–15)
GLUCOSE BLDC GLUCOMTR-MCNC: 160 MG/DL (ref 70–99)
GLUCOSE SERPL-MCNC: 126 MG/DL (ref 70–99)
HCO3 BLDV-SCNC: 32 MMOL/L (ref 21–28)
HCO3 BLDV-SCNC: 34 MMOL/L (ref 21–28)
HCT VFR BLD AUTO: 29.1 % (ref 35–47)
HGB BLD-MCNC: 8.6 G/DL (ref 11.7–15.7)
IMM GRANULOCYTES # BLD: ABNORMAL 10*3/UL
IMM GRANULOCYTES NFR BLD: ABNORMAL %
LYMPHOCYTES # BLD AUTO: ABNORMAL 10*3/UL
LYMPHOCYTES # BLD MANUAL: 0.7 10E3/UL (ref 0.8–5.3)
LYMPHOCYTES NFR BLD AUTO: ABNORMAL %
LYMPHOCYTES NFR BLD MANUAL: 14 %
MAGNESIUM SERPL-MCNC: 1.8 MG/DL (ref 1.7–2.3)
MAGNESIUM SERPL-MCNC: 2.2 MG/DL (ref 1.7–2.3)
MCH RBC QN AUTO: 34.5 PG (ref 26.5–33)
MCHC RBC AUTO-ENTMCNC: 29.6 G/DL (ref 31.5–36.5)
MCV RBC AUTO: 117 FL (ref 78–100)
MONOCYTES # BLD AUTO: ABNORMAL 10*3/UL
MONOCYTES # BLD MANUAL: 0.4 10E3/UL (ref 0–1.3)
MONOCYTES NFR BLD AUTO: ABNORMAL %
MONOCYTES NFR BLD MANUAL: 9 %
NEUTROPHILS # BLD AUTO: ABNORMAL 10*3/UL
NEUTROPHILS # BLD MANUAL: 3.5 10E3/UL (ref 1.6–8.3)
NEUTROPHILS NFR BLD AUTO: ABNORMAL %
NEUTROPHILS NFR BLD MANUAL: 72 %
NRBC # BLD AUTO: 0 10E3/UL
NRBC BLD AUTO-RTO: 0 /100
O2/TOTAL GAS SETTING VFR VENT: 35 %
O2/TOTAL GAS SETTING VFR VENT: 35 %
OXYHGB MFR BLDV: 85 % (ref 70–75)
OXYHGB MFR BLDV: 95 % (ref 70–75)
PCO2 BLDV: 76 MM HG (ref 40–50)
PCO2 BLDV: 77 MM HG (ref 40–50)
PH BLDV: 7.24 [PH] (ref 7.32–7.43)
PH BLDV: 7.25 [PH] (ref 7.32–7.43)
PHOSPHATE SERPL-MCNC: 2.1 MG/DL (ref 2.5–4.5)
PHOSPHATE SERPL-MCNC: 3.3 MG/DL (ref 2.5–4.5)
PLAT MORPH BLD: ABNORMAL
PLATELET # BLD AUTO: 236 10E3/UL (ref 150–450)
PO2 BLDV: 59 MM HG (ref 25–47)
PO2 BLDV: 92 MM HG (ref 25–47)
POTASSIUM SERPL-SCNC: 3.5 MMOL/L (ref 3.4–5.3)
PROT SERPL-MCNC: 5.3 G/DL (ref 6.4–8.3)
RBC # BLD AUTO: 2.49 10E6/UL (ref 3.8–5.2)
RBC MORPH BLD: ABNORMAL
SAO2 % BLDV: 88.4 % (ref 70–75)
SAO2 % BLDV: 97.3 % (ref 70–75)
SODIUM SERPL-SCNC: 138 MMOL/L (ref 135–145)
TACROLIMUS BLD-MCNC: 12.9 UG/L (ref 5–15)
THEOPHYLLINE SERPL-MCNC: 4.9 UG/ML (ref 10–20)
TME LAST DOSE: NORMAL H
TME LAST DOSE: NORMAL H
WBC # BLD AUTO: 4.9 10E3/UL (ref 4–11)

## 2024-03-02 PROCEDURE — 94640 AIRWAY INHALATION TREATMENT: CPT

## 2024-03-02 PROCEDURE — 80198 ASSAY OF THEOPHYLLINE: CPT | Performed by: INTERNAL MEDICINE

## 2024-03-02 PROCEDURE — 36415 COLL VENOUS BLD VENIPUNCTURE: CPT

## 2024-03-02 PROCEDURE — 82805 BLOOD GASES W/O2 SATURATION: CPT | Performed by: NURSE PRACTITIONER

## 2024-03-02 PROCEDURE — 250N000013 HC RX MED GY IP 250 OP 250 PS 637

## 2024-03-02 PROCEDURE — 250N000012 HC RX MED GY IP 250 OP 636 PS 637: Performed by: NURSE PRACTITIONER

## 2024-03-02 PROCEDURE — 85014 HEMATOCRIT: CPT

## 2024-03-02 PROCEDURE — 83735 ASSAY OF MAGNESIUM: CPT

## 2024-03-02 PROCEDURE — 258N000003 HC RX IP 258 OP 636: Performed by: INTERNAL MEDICINE

## 2024-03-02 PROCEDURE — 99233 SBSQ HOSP IP/OBS HIGH 50: CPT | Performed by: PHYSICIAN ASSISTANT

## 2024-03-02 PROCEDURE — 999N000157 HC STATISTIC RCP TIME EA 10 MIN

## 2024-03-02 PROCEDURE — 250N000009 HC RX 250: Performed by: INTERNAL MEDICINE

## 2024-03-02 PROCEDURE — 120N000002 HC R&B MED SURG/OB UMMC

## 2024-03-02 PROCEDURE — 250N000009 HC RX 250

## 2024-03-02 PROCEDURE — 250N000012 HC RX MED GY IP 250 OP 636 PS 637

## 2024-03-02 PROCEDURE — 84100 ASSAY OF PHOSPHORUS: CPT

## 2024-03-02 PROCEDURE — 36591 DRAW BLOOD OFF VENOUS DEVICE: CPT | Performed by: NURSE PRACTITIONER

## 2024-03-02 PROCEDURE — 80197 ASSAY OF TACROLIMUS: CPT | Performed by: NURSE PRACTITIONER

## 2024-03-02 PROCEDURE — 90935 HEMODIALYSIS ONE EVALUATION: CPT | Performed by: INTERNAL MEDICINE

## 2024-03-02 PROCEDURE — 80053 COMPREHEN METABOLIC PANEL: CPT

## 2024-03-02 PROCEDURE — 94640 AIRWAY INHALATION TREATMENT: CPT | Mod: 76

## 2024-03-02 PROCEDURE — 36415 COLL VENOUS BLD VENIPUNCTURE: CPT | Performed by: NURSE PRACTITIONER

## 2024-03-02 PROCEDURE — 90935 HEMODIALYSIS ONE EVALUATION: CPT

## 2024-03-02 PROCEDURE — 97535 SELF CARE MNGMENT TRAINING: CPT | Mod: GO | Performed by: OCCUPATIONAL THERAPIST

## 2024-03-02 PROCEDURE — 85007 BL SMEAR W/DIFF WBC COUNT: CPT

## 2024-03-02 PROCEDURE — 99232 SBSQ HOSP IP/OBS MODERATE 35: CPT | Mod: GC | Performed by: INTERNAL MEDICINE

## 2024-03-02 PROCEDURE — 250N000012 HC RX MED GY IP 250 OP 636 PS 637: Performed by: PHYSICIAN ASSISTANT

## 2024-03-02 PROCEDURE — 634N000001 HC RX 634: Mod: JZ | Performed by: INTERNAL MEDICINE

## 2024-03-02 RX ORDER — THEOPHYLLINE 80 MG/15ML
133 SOLUTION ORAL EVERY 8 HOURS SCHEDULED
Status: DISCONTINUED | OUTPATIENT
Start: 2024-03-02 | End: 2024-03-03

## 2024-03-02 RX ADMIN — SODIUM CHLORIDE 300 ML: 9 INJECTION, SOLUTION INTRAVENOUS at 08:00

## 2024-03-02 RX ADMIN — LOPERAMIDE HYDROCHLORIDE 2 MG: 2 CAPSULE ORAL at 12:28

## 2024-03-02 RX ADMIN — LEVALBUTEROL HYDROCHLORIDE 1.25 MG: 1.25 SOLUTION RESPIRATORY (INHALATION) at 19:57

## 2024-03-02 RX ADMIN — TACROLIMUS 4 MG: 5 CAPSULE ORAL at 18:01

## 2024-03-02 RX ADMIN — PREDNISONE 5 MG: 5 TABLET ORAL at 12:23

## 2024-03-02 RX ADMIN — Medication 2.5 MCG: at 12:23

## 2024-03-02 RX ADMIN — THEOPHYLLINE 100 MG: 80 SOLUTION ORAL at 12:23

## 2024-03-02 RX ADMIN — LIDOCAINE AND PRILOCAINE: 25; 25 CREAM TOPICAL at 07:30

## 2024-03-02 RX ADMIN — LIDOCAINE 4% 1 PATCH: 40 PATCH TOPICAL at 20:44

## 2024-03-02 RX ADMIN — CALCIUM CARBONATE 600 MG (1,500 MG)-VITAMIN D3 400 UNIT TABLET 1 TABLET: at 17:59

## 2024-03-02 RX ADMIN — SMOFLIPID 250 ML: 6; 6; 5; 3 INJECTION, EMULSION INTRAVENOUS at 20:46

## 2024-03-02 RX ADMIN — SODIUM CHLORIDE 250 ML: 9 INJECTION, SOLUTION INTRAVENOUS at 11:01

## 2024-03-02 RX ADMIN — Medication 2.5 MCG: at 20:43

## 2024-03-02 RX ADMIN — Medication 40 MG: at 20:44

## 2024-03-02 RX ADMIN — LOPERAMIDE HYDROCHLORIDE 2 MG: 2 CAPSULE ORAL at 23:01

## 2024-03-02 RX ADMIN — PREDNISONE 2.5 MG: 2.5 TABLET ORAL at 20:43

## 2024-03-02 RX ADMIN — Medication 40 MG: at 12:23

## 2024-03-02 RX ADMIN — TACROLIMUS 4.5 MG: 5 CAPSULE ORAL at 12:22

## 2024-03-02 RX ADMIN — MAGNESIUM SULFATE HEPTAHYDRATE: 500 INJECTION, SOLUTION INTRAMUSCULAR; INTRAVENOUS at 20:45

## 2024-03-02 RX ADMIN — Medication: at 11:02

## 2024-03-02 RX ADMIN — CYANOCOBALAMIN TAB 500 MCG 500 MCG: 500 TAB at 12:23

## 2024-03-02 RX ADMIN — EPOETIN ALFA-EPBX 8000 UNITS: 10000 INJECTION, SOLUTION INTRAVENOUS; SUBCUTANEOUS at 11:01

## 2024-03-02 RX ADMIN — ACETAMINOPHEN 975 MG: 325 TABLET, FILM COATED ORAL at 09:53

## 2024-03-02 RX ADMIN — CALCIUM CARBONATE 600 MG (1,500 MG)-VITAMIN D3 400 UNIT TABLET 1 TABLET: at 12:23

## 2024-03-02 RX ADMIN — LEVOTHYROXINE SODIUM 25 MCG: 0.03 TABLET ORAL at 12:24

## 2024-03-02 RX ADMIN — THEOPHYLLINE 133 MG: 80 SOLUTION ORAL at 23:01

## 2024-03-02 RX ADMIN — THEOPHYLLINE 133 MG: 80 SOLUTION ORAL at 15:22

## 2024-03-02 ASSESSMENT — ACTIVITIES OF DAILY LIVING (ADL)
ADLS_ACUITY_SCORE: 36

## 2024-03-02 NOTE — PROGRESS NOTES
HEMODIALYSIS TREATMENT NOTE    Date: 3/2/2024  Time: 11:08 AM    Data:  Pre Wt: 50.2 kg  Desired Wt: 46.7 kg   Post Wt: 47.3 kg  Weight change: 2.9 kg  Ultrafiltration - Post Run Net Total Removed (mL): 2900 mL  Vascular Access Status: patent  Dialyzer Rinse: Streaked, Light  Total Blood Volume Processed: 86.67  L Liters  Total Dialysis (Treatment) Time: 3.5 Hours    Lab:   No    Interventions:  Epoetin  Tylenol  Pt request tx be terminated 30 mins early. Dr. Bryan notified and okay to terminate tx    Assessment:  Pt ran for 3.5 hrs on a K3/ Ca 2.25 bath with a BfR 450/  and 2.9 L pulled. LAVG positive for T/B. Pt rinsed back and hemostasis achieved in about 10 mins. Pt alert and oriented x4, complaint of headache, tylenol admin per MAR. Report given to PCN.     Plan:    Per renal team

## 2024-03-02 NOTE — PLAN OF CARE
Transferred to: Unit 7C  at (time) 0045  Belongings: Phone, purse, clothes, walker.  Central line removed? No  Chart and medications sent with patient Yes  Family notified: No: Information can wait until morning.    Major Shift Events:  2 loose stools. Stand and pivot to commode. Pt tolerated BiPAP for 1 hr before they stated it was too uncomfortable, said they would wear it again after the next time they went to the commode. 3LNC when not on BiPAP. N: WDL, call light appropriate, denied pain. C: Tachycardic, 90-106bpm. Afebrile, slightly hypertensive, SBP 140s. R: 3L NC, LS clear/diminished. Use BiPAP when tolerated. GI: PEG tube clamped, use for meds. RENAL DIET, for comfort. BG daily. Multiple loose watery stools. Continent. : Anuric, hemodialysis. Skin: Soft skin, skin tear on AV fistula on left arm, scab on right shin, blanchable redness on sacrum/coccyx. IV: Tunneled single lumen right subclavian CVC, 2 right PIV. Drips: TPN.    Plan: Dialysis on Tuesday, Thursday, and Saturday.    For vital signs and complete assessments, please see documentation flowsheets.

## 2024-03-02 NOTE — PROGRESS NOTES
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 On 2/17/24 she was transferred to ICU for worsening mental status and acute hypoxic and hypercarbic respiratory failure inspite of BiPAP requiring intubation. She was suspected to have PNA and started on antibiotics. Extubated on 2/19 without complications and tolerated iHD on 2/20, and tolerated PO regular diet in addition to TPN. Developed progressively worsening respiratory acidosis and hypercarbia, and was re-admitted to ICU on 2/28/24 for close monitoring of intermittent BiPAP and possible intubation, however she improved on AVAPS setting and well enough to transfer back to medicine floor on 2/29/24.      Changes today:   -  Continue Bipap while sleeping (and with naps).  If mental status worsens, will use Bipap during the day too as it may indicate worsening hypercarbia  - 3L O2 for meals and breaks from Bipap  -  Theophylline goal level is 10-14 - level is 4.9 today, will discuss with pharmacy about re-dosing bolus vs increasing maintenance dose  -  Trending VBGs BID  - appreciated transplant pulmonary  recommendations, pending  - Continue thyroid replacement per ICU team recommendations, repeat thyroid function labs in one week (3/6) for reassessment  - lymphedema consult for BLE edema      #Severe respiratory acidosis, improved on BiPAP  #Acute hypoxic and hypercarbic respiratory failure  #S/P Lung transplant 2022 c/b right hemidiaphragm palsy  #Recurrent pneumonia, resolved  Patient received a bilateral lung transplant 6/28/22 for COPD. Was admitted 2/10 to address malnutrition and admitted to ICU on 2/17 with hypoxic hypercarbic respiratory failure. Intubated on 2/18 AM  due to worsening oxygen requirements, likely due to pulmonary edema given hx of ESRD requiring HD vs infection given hx of lung transplant, immunosuppressed status, and recurrent pneumonias. Extubated on 2/19 without complications,   Micro Follow-up on BAL, viral panel, CMV, fungus, and Blood Cultures show no abnormalities. Does have slowly rising pCO2 on VBG - HFNC did not seem to improve elevated pCO2 and instead seemed to potentially cause worsening, possibly due to depressed respiratory drive from high O2. Patient initially was adamant that she would not tolerate BiPAP, however was agreeable to trial on 2/27 PM. Despite intermittent BiPAP overnight, VBG was worse and patient transferred to ICU on 2/28 AM. RT adjusted BiPAP settings while in ICU to AVAPS with improvement in mental status and VBG, and patient transferred back to medicine floor on 2/29. ICU team additionally added theophylline and thyroid replacement as both can help with diaphragmatic/respiratory muscle weakness in setting of COPD and malnutrition. MRI pending for evaluation of possible central cause of decreased ventilatory drive.   - discussed with transplant pulmonary team - once mentally clear, would like to evaluate GOC with palliative care team (expect to consult on Friday 3/1) given concern that TPN (which is her main source of nutrition) is causing hypervolemia that  may require daily dialysis to manage volume, and may also be contributing to her respiratory acidosis  - neurology consulted to assist with evaluation for possible central etiology of hypercapnic respiratory failure - MRI ordered, prn for claustrophobia ordered   - discontinued sodium bicarb  - PRN hypertonic saline inhaler with albuterol nebs  - Continue good pulm toilet  - Transplant pulmonology following, recs appreciated  - PTA immunosuppressive agent  >Prednisone 5 mg every morning, 2.5 mg every afternoon; tacrolimus 4 mg every morning, 4mg every afternoon  > Monitor tacro levels, goal 7-9  > - overnight oximetry study suggestive of O2 vs CPAP need, as did require up to 2LPM overnight to prevent hypoxia  > Try to minimize O2 to preserve respiratory drive, will give IVIG for DSA+   - avoid HFNC, maintain minimum oxygen to keep SaO2 >85% - pending recs from transplant pulm  - PTA dapsone MWF for PJP prophylaxis  - completed course of zosyn for empiric HAP course (2/17 - 2-23)  - Initial decompensation likely from opioids - limit medications that would depress respiration   - Continue theophylline with goal level of 10-14   - Re-bolused 3/1 per Dr. Stone with 3mg/kg   - Continue scheduled theophylline    - Follow up theophylline level in AM - low again at 4.9 on 3/2, will discuss with pharmacy re: dose adjustments and repeat level in AM on 3/3  - port culture per transplant ID rec- [id number]  - awaiting metabolic CART study results        Antibiotics:     Vancomycin (2/17 - 2/17)  Zosyn (2/17 - 2/23)  Dapsone MWF, PJP ppx   Micafungin (2/18- 2/21)     Immunosuppression  Tacrolimus  Prednisone       #Severe malnutrition   #FTT   #Hypoalbuminemia  #Gastroparesis, small bowel dysmotility  #S/P PEG/J with intolerance of enteral nutrition  # Chronic osmotic diarrhea/SIBO s/p Rifaximin > improving     Patient with gastroparesis (presumed due to vagal injury) and small bowel dysmotility complicated by unintentional 40lb  weight loss over the past year and now severe malnutrition. Previously intolerant to oral food intake due to nausea. Was initially admitted for portacath and TPN initiation since 2/13. After extubation has been able to tolerate feeds without n/v from 2/20. Electrolytes trending towards baseline today.  Per GI, next steps for workup for malnutrition would include CT enterography to evaluate for anatomic abnormalities contributing to malnutrition vs possible treatment for SIBO (though patient has had multiple courses of treatment in the past with no long term improvement). Patient couldn't tolerate the oral load for CT enterography on 2/21, so will defer this until she is able to tolerate greater PO load. On 2/23 , again discussed with patient the need of small bowel motility study if not improving outpatient through [facility]. No ongoing concerns for SIBO on 2/23 as patient is passing multiple episodes of stools and gas with no abdominal pain or distention. C-diff test negative on 2/21. Per RD, patient tolerating >300 kcal of intake PO currently, so stopped trickle Tube feeds and continuing with PO and TPN for nutrition.   - Regular diet + increased TPN +  no further tube feeds per RD & transplant pul discussion               - Nephrology: limit fluid < 1.5 L per day for TPN ( chart vol of TPN in the I/O)               - RD concerned pt is not absorbing any oral intake and they will be monitoring Bowel function, I/O and, PO/TF/TPN intake.               -  discussed with tx pulm team - stopped TF as PO intake sufficient for preventing gut atrophy (>300 kcal per day)  - Continue calorie count  - CT enterography with contrast- Pt not able to tolerate oral contrast load of 1500 ml on 2/21; no ongoing concerns for SIBO, would need small bowel motility study if not improving outpatient through [facility]  - Daily Weights  - monitor CMP in the AM      #Acute on chronic Anemia 2/2 ESRD  Hgb have been stable 7-8s, with no acute signs of  bleeding, acute drop 2/29 from 8.2 > 6.6 after two rechecks, no overt signs of bleeding noted, patient reports some abdominal pain but otherwise physical exam unremarkable.  LUE US negative for DVT 2/18.    - continue epogen per nephrology with dialysis  - venofer 50 mcg qweek with dialysis  - type and screen performed 2/29, 1 unit pRBCs ordered and transfused      #ESRD on HD T/Th/Sat  #Hypervolemic hyponatremia  #Anion gap metabolic acidosis - resolved  Patient is ESRD on T/Th/Sat HD, Hgb stabilizing. HD tolerating well. Continuing monitoring electrolytes daily. Anion gap normal since 2/21. Will continue to monitor daily labs/ABG  for refeeding syndrome and acidosis  - Continue Venofer injections    - CBC and CMP daily  - Continue epogen dose 8000 units as per nephrology  - Strict I/Os  - HD T/TH/Sat vs daily UF per nephrology       # Elevated TSH and low T4 and T3  Patient with new low T4 at 0.64 and TSH at 6.5, concerning for new hypothyroidism vs sick thyroid syndrome. TSH with appropriate response, more consistent with elevation in the setting of acute illness. ICU team on 2/28 recommended initiation of treatment for possible hypothyroid as this can improve respiratory muscle function in the setting of weakness and malnutrition.   - continue liothyronine and levothyroxine per ICU team recommendations  - repeat thyroid function studies in one week, adjust dosing as needed      #Left upper extremity unilateral edema, improving   #Bilateral pedal and ankle edema, improving  Edema due to third spacing due to hypoalbuminemia vs HF. BNP >32581 at admission, Echo on 2/18 shows EF of 55-60% with IVC of <2.1 and collapsing >50% with sniff, normal RA pressure, no significant valvular abnormalities ;  Left upper extremity swelling and  pitting lower extremity edema likely due to hypoalbuminemia improved today. Upper limb duplex USG on 2/18 negative for DVT. Wt went from 43.4 kg on admission to 47.4 kg today. She is  urinating but cannot chart as she usually urinates with BM. She reports trending to baseline with urination. She denies dysuria, retention symptoms. USG left arm on 2/21 shows steel physiology and Vascular Surgery consulted. Vascular surgery has only minor concerns for steal symptoms and given that the AVF is working well, they are okay with outpatient fistulograms/ venoplasty with wrist brachial index and PPG's, unless new concerns arise. LUE and LE edema significantly decreased since yesterdays HD. No new tingling/ numbness noticed.  - Continue daily weights  - Strict I/Os  - Elevation and wrapping of only lower legs as needed and increased UF per nephrology for volume management; no wrapping of Left upper extremity with dialysis fistula  - will place lymphedema consult for BLE edema       #Steal physiology of LUE dialysis access fistula  LUE arterial US obtained 2/21 with concern for LUE edema. US of fistula demonstrated steal physiology. Discussed with nephrology, and vascular surgery consulted on 2/22. Vascular surgery has only minor concerns for steal symptoms and given that the AVF is working well, they are okay with outpatient fistulograms/ venoplasty with wrist brachial index and PPG's, unless new concerns arise.  - plan for outpatient workup as above; nephrology in agreement with outpatient workup.       #Facial and neck bruising  #Pain  Reports soreness in her neck from lying in bed. No soreness in the buttocks/ back. Patient has multiple bruises over her arms and face which is attributed to previous falls. No new bruising reported today. Patients reports her headaches are improving with tylenol. Using heating pads and lidocaine patch for body pains. Couple of small skin tears noted by the Rn's. Skin care done accordingly.  - Tylenol Q4  - Lidocaine patch prn  - Heating pads prn  - Diligent skin cares       #HTN  #A-fib, resolved  Noted atrial fibrillation on arrival with HR elevated at 150, not sustained  for > 10 minutes and otherwise hemodynamically stable. Rates stable in the 80's. BP normalizing since 2/22.  - PTA metoprolol 25mg BID - holding for now with lower BP after transfer to ICU and with increased UF per nephrology, resume if becoming more HTN  - Continuous telemetry       # Encephalopathy secondary to hypercarbia - resolved  Patient was progressively more lethargic on 2/17 during admission in [facility]. Etiology likely secondary to hypercarbia in context of respiratory failure as patient was noted to have high CO2s concomitant with lethargy. Though receiving oxycodone and fentanyl, lethargy was only partially alleviated by narcan. LFTs and ammonia wnl with low suspicion of hepatic encephalopathy. BUN wnl with low suspicion for uremic encephalopathy. Head CT on 2/18 was negative with low suspicion of acute intracranial pathology. Patient is awake, alert, oriented and following commands since 2/20.        # Right hip fracture s/p ORIF (December 2023)   - PT/OT       # GERD  - PTA PPI       # Hx EBV viremia   # Hypogammaglobulinemia  # Chronic immunosuppression 2/2 lung transplant  Patient has been afebrile and has not had leukocytosis however she is under immunosuppression. Given acute respiratory failure and hx of recurrent pneumonias, she was started on empiric abx for concerns over new pneumonia. RVP and cultures no growth to date. Last day of empirical treatment for HAP.  - EBV 27K (decreased compared to prior)         Lines/tubes/drains:  - CVC RIJ (for TPN, is tunneled line)   - PIV x2  - PEG/J  - HD AV fistula, left arm         Diet:   Regular diet PO  Holding trickle tube feeds if persistent PO intake >300 kcal/day  Majority of nutrition through TPN per RD     General Cares/Prophylaxis:    DVT Prophylaxis: Heparin subcutaneous - holding with need for transfusion on 2/29, will monitor Hgb for signs of bleeding before resuming  GI Prophylaxis: PPI  Fluids: As per TPN  Code Status: Full         Diet: Renal Diet (dialysis)  parenteral nutrition - ADULT compounded formula    DVT Prophylaxis: holding subQ heparin for now while monitoring after transfusion 2/29  Dong Catheter: Not present  Fluids: Per TPN and PO  Lines: PRESENT      CVC Single Lumen Right Internal jugular Non - valved (open ended);Tunneled;Power injectable-Site Assessment: WDL  Hemodialysis Vascular Access Arteriovenous graft Superior Arm-Site Assessment: WDL      Cardiac Monitoring: None  Code Status: Full Code      Clinically Significant Risk Factors        # Hypokalemia: Lowest K = 3.1 mmol/L in last 2 days, will replace as needed       # Hypoalbuminemia: Lowest albumin = 2.6 g/dL at 2/18/2024  5:13 AM, will monitor as appropriate             # Severe Malnutrition: based on nutrition assessment      # Financial/Environmental Concerns: none         Disposition Plan         The patient's care was discussed with the Attending physician Dr. Delacruz, Bedside Nurse, Patient, and Transplant Pulm Consultant(s).    Jannet Isaac MD  Internal Medicine Resident, PGY-3  [address]  Securely message with Vocera (more info)  Text page via [email or web address] Paging/Directory   See signed in provider for up to date coverage information  ______________________________________________________________________    Interval History   No acute events overnight. VBG mostly stable, slight worsening in AM. Wore BiPAP for about 4 hours overnight. Feels like she overall slept well. No new pains. No nausea or vomiting. No fevers or chills. Feels like edema in feet and legs is worse today. Also notes overall she does not feel like her energy level is good, but does feel somewhat stronger today than she did yesterday or the day before.     Physical Exam   Vital Signs: Temp: 98.1  F (36.7  C) Temp src: Oral BP: 130/73 Pulse: 99   Resp: 16 SpO2: 99 % O2 Device: Nasal cannula Oxygen Delivery: 3 LPM  Weight: 110  lbs 10.73 oz    General: thin, alert and fully oriented, laying in bed in dialysis, able to fully participate in conversation  HEENT: Normocephalic, bruising on the right face around right orbit with hematoma and right neck, improved  Neuro: very awake and alert, fully oriented, following commands, answering questions clearly and easily, moving all extremities  Pulm/Resp: breathing on NC, diminished lung sounds in bases bilaterally, no increased work of breathing  CV: RRR, warm extremities, 1+ pitting edema in left hand, no edema in right arm or hand, 1+ pitting edema in the right lower leg  Abdomen: Non-distended, PEG in place, non-tender  Incisions/Skin: Bruising to face and right forearm.     Medical Decision Making       Please see A&P for additional details of medical decision making.      Data     I have personally reviewed the following data over the past 24 hrs:    4.9  \   8.6 (L)   / 236     138 101 46.9 (H) /  126 (H)   3.5 27 3.01 (H) \     ALT: <5 AST: 16 AP: 70 TBILI: 0.2   ALB: 3.1 (L) TOT PROTEIN: 5.3 (L) LIPASE: N/A       Imaging results reviewed over the past 24 hrs:   Recent Results (from the past 24 hour(s))   MR Brain w/o & w Contrast    Narrative     MR BRAIN W/O & W CONTRAST 3/1/2024 5:38 PM    Provided History: patient with acute severe respiratory acidosis  without proper physiologic response - eval for stroke or other  etiology of central cause for decreased respiratory drive.    Comparison: CT head 2/18/2024.    Technique: Multiplanar T1-weighted, axial FLAIR, and susceptibility  images were obtained without intravenous contrast. Following  intravenous gadolinium-based contrast administration, axial  T2-weighted, diffusion, and T1-weighted images (in multiple planes)  were obtained.    Contrast: 5mL Gadavist     Findings:  There is no mass effect, midline shift, or evidence of intracranial  hemorrhage. The ventricles are proportionate to the cerebral sulci.  Normal major vascular  intracranial flow-voids. Scattered T2  hyperintensities throughout the periventricular and subcortical white  matter, nonspecific but likely sequelae of chronic small vessel  ischemic disease.    Postcontrast images demonstrate no abnormal intracranial enhancement.  No abnormal diffusion restriction. Susceptibility weighted images are  unremarkable.    No abnormality of the skull marrow signal. The visualized portions of  paranasal sinuses are relatively clear. Trace bilateral mastoid  effusions. The orbits are grossly unremarkable.      Impression    Impression:     1. No acute intracranial pathology.  2. Moderate leukoaraiosis.    I have personally reviewed the examination and initial interpretation  and I agree with the findings.    JUAN PRASAD MD         SYSTEM ID:  [id number]

## 2024-03-02 NOTE — PROGRESS NOTES
Pulmonary Medicine  Cystic Fibrosis - Lung Transplant Team  Progress Note  2024     Patient: Sofie Rodriguez  MRN: [id number]  : 1960 (age 63 year old)  Transplant: 2022 (Lung), POD#[id number]  Admission date: 2/10/2024    Assessment & Plan:     Sofei Rodriguez is a 63 year old female with h/o COPD s/p BSLT () with course complicated by post-operative hemidiaphragm palsy, recurrent PNAs, positive DSA, EBV viremia, hypogammaglobulinemia, severe gastroparesis s/p G/J tube placement (22) and pyloric botox (23), GI bleed / pyloric ulcer, hemobilia s/p ERCP and MRCP, chronic diarrhea, recurrent C diff colitis, ESRD on iHD, recurrent falls with injuries (recent right hip fx s/p ORIF 2023), deconditioning, and FTT.  Admitted 2/10 for initiation of TPN/lipids.  Transferred to ICU  for emergent intubation for hypoxic and hypercapneic respiratory failure with severe respiratory acidosis and encephalopathy.  iHD increased, infectious workup unremarkable.  Extubated .  Continues with persistent and progressive hypercapnia with severe acidosis, returned to ICU - d/t tenuous respiratory status.  Improvement noted with increase in tolerance of NIPPV, daytime weaning trials initiated , monitoring tolerance.     Today's recommendations:  - Follow pending blood cultures (), sputum cultures ordered if able to collect  - AVAPS with  ml, trial off during the day as tolerated (on NC vs HFNC) and continuous overnight, VBG ordered q12h for assessment on and off NIPPV   - Tacrolimus level supratherapeutic, dose decreased and adjusted to administer via G tube per Dr. Gong (discussed with RN), repeat level ordered 3/5  - Repeat DSA ordered 3/6  - Repeat CMV, Prospera, and EBV ordered 3/18  - TF remains on hold, consider resuming post-pyloric feeds when appropriate (even if only at trickle rate)  - TPN/lipids per primary  - CT enterography recommended per GI, but unlikely to be  able to tolerate the required 1.5 L enteral contrast bolus so deferring for now     Acute on chronic hypoxic/hypercapneic respiratory failure:  S/p bilateral sequential lung transplant for COPD:  Right hemidiaphragm palsy:   Hypoventilation, Suspected CARLEE: CT PTA (2/7) with decreased MARLON opacities but new tree in bud RLL opacities.  PFTs unchanged in clinic (but ATS criteria not met), remain significantly below her baseline.  Baseline hypoxia with 2L NC overnight.  Respiratory decompensation with encephalopathy and severe respiratory acidosis on 2/17.  S/p intubation 2/17-2/19.  Repeat CT (2/17) with new patchy consolidative and nodular opacities, GGO primarily in the bases and intralobular septal thickening (concerning for pulmonary edema given recent addition of TPN nutrition, new infection, PTLD, and/or septic emboli.  Bronch with lavage of RML (2/18) with very friable tissue, cutlures only with C. glabrata.  S/p empiric Zosyn (2/17-2/24) and micafungin (2/18-2/21).  Severe respiratory acidosis with hypercapnia persisting with slight improvement with NIPPV treatment, limited by pt. tolerance to pressure and mask (claustrophobia).  Persistent respiratory failure and variable encephalopathy also complicated by diaphragmatic weakness, hypoventilation, and deconditioning d/t malnutrition.  Repeat CT (2/26) with diffuse GG and consolidative opacities >BLL and diffuse interlobular septal thickening.  Intermittent tolerance of NIPPV persists, reports some improvement in comfort with transition from BiPAP to AVAPS.  Neurology consulted 2/27.  Procal increased to 1.56 (2/28).  Transferred to ICU 2/28 given tenuous respiratory status and potential need for reintubation.  Improvement noted with continuous NIPPV with minimal interruptions (sodium bicarb also stopped, likely partially contributing), trial off NIPPV during the day started 2/29.  MRI brain (3/1) with moderate leukoaraiosis, no acute intracranial pathology.  -  Blood cultures (2/28) NGTD  - Sputum cultures ordered if able to collect  - Xopenex BID (2/27)  - Defer ABX at this time  - AVAPS with  ml, continue to trial off during the day as tolerated (on NC vs HFNC) and continuous overnight, VBG ordered q12h for assessment on and off NIPPV  - Defer trach consideration pending assessment of hypercapnia management with only nocturnal NIPPV and Pt. tolerance of ordered nocturnal therapy  - Sleep clinic eval indicated as OP     Immunosuppression:  ImmuKnow low at 108 on 1/10.  AZA previously stopped 5/2023 d/t low ImmuKnow assay and EBV viremia.  Repeat ImmuKnow (2/27) low at 88, defer dose adjustment (tacro goal already decreased one day prior).  - Tacrolimus 4.5 mg BID (increased 2/28).  Goal level 6-8 (decreased 2/26 d/t ImmuKnow and concern for infection).  Level 3/2 supratherapeutic at 12.9 (from prior 6.2 on 2/28 with modest dose increase), decreased dose to 4 mg BID with plan to give consistently via G tube per Dr. Gong (discussed with RN), repeat level ordered 3/5.  - Prednisone 5 mg qAM / 2.5 mg qPM     Prophylaxis:   - Dapsone 50 mg q MWF for PJP ppx  - CMV ppx not currently indicated, D+/R+, CMV negative 2/19 (BAL very mildly positive 2/18, likely not clinically significant), repeat CMV ordered 3/18     Positive DSA: PFTs and pulmonary symptoms have remained stable as OP, so AMR treatment deferred given frailty.  Most recent DSA (2/7) with DQB2 mfi 6966 (from 5723 one month prior).  Most recent cell-free DNA Prospera (2/18) mildly elevated at 1.04 (concerning for possible rejection), which was increased from prior level of 0.12 (1/10).  IST increase deferred at that time per Dr. Gong.  S/p IVIG (2/27) for DSA (IgG WNL).  - Repeat DSA ordered 3/6  - Repeat Prospera ordered 3/18      EBV viremia: CT CAP (2/7) without lymphadenopathy.  Most recent level (2/18) improved to 28k from 96k (2/7)  - Repeat EBV ordered 3/18     Other relevant problems being managed by  the primary team:      FTT:  Severe protein calorie malnutrition:  Gastroparesis s/p PEG/J, botox, and G-POEM:  SB hypomotility:  Pyloric ulcer:  Chronic nausea and osmotic diarrhea:  SIBO s/p rifaximin:   Recurrent C diff colitis: Chronic osmotic and infectious diarrhea since transplant with recurrent episodes of C diff.  Notable weight loss (40# in a year) d/t diarrhea, GI dysmotility, and intolerance of enteral feeds (PEG/J tube in place), most recently on elemental formula.  Extensive OP eval and f/u with GI.  S/p port placement for TPN and lipids.  Tolerating modest PO intake, monitoring for refeeding syndrome.    - TF remains on hold, consider resuming post-pyloric feeds when appropriate (even if only at trickle rate)  - TPN/lipids per primary  - CT enterography recommended per GI, but unlikely to be able to tolerate the required 1.5 L enteral contrast bolus so deferring for now     ESRD: CT scan (with declining respiratory status) with volume overload secondary to TPN volume, iHD increased from PTA TThSa schedule with unsustained improvement.  Management per nephrology.    We appreciate the excellent care provided by the [place] [id number] team.  Recommendations communicated via telephone and this note.  Will continue to follow along closely, please do not hesitate to call with any questions or concerns.    Patient discussed with Dr. Gong.    Yuliet Aviles PA-C  [facility]  Pulmonary CF/Transplant     Subjective & Interval History:     AVAPS x ~4h last night per Pt, on 3L NC this morning and breathing feels comfortable.  No new cough or sputum.  Small, frequent loose stools.     Review of Systems:     C: No fever, no chills, no change in weight  INTEGUMENTARY/SKIN: No rash or obvious new lesions  ENT/MOUTH: No sore throat, no sinus pain, no nasal congestion or drainage  RESP: See interval history  CV: No chest pain, no palpitations, + peripheral edema  GI: + occ nausea, no vomiting, no reflux  "symptoms  : + HD  MUSCULOSKELETAL: No myalgias, no arthralgias  ENDOCRINE: Blood sugars with adequate control  NEURO: No headache  PSYCHIATRIC: Mood stable    Physical Exam:     All notes, images, and labs from past 24 hours (at minimum) were reviewed.    Vital signs:  Temp: 98.1  F (36.7  C) Temp src: Oral BP: 128/67 Pulse: 92   Resp: 20 SpO2: 100 % O2 Device: Nasal cannula Oxygen Delivery: 3 LPM Height: 157 cm (5' 1.81\") Weight: 50.2 kg (110 lb 10.7 oz)  I/O:   Intake/Output Summary (Last 24 hours) at 3/2/2024 1604  Last data filed at 3/2/2024 1522  Gross per 24 hour   Intake 778 ml   Output 2900 ml   Net -2122 ml     Constitutional: Lying in bed at dialysis, thin and frail appearing, in no apparent distress.   HEENT: Eyes with pink conjunctivae, anicteric.  Oral mucosa moist without lesions.   PULM: Good air flow bilaterally.  Occasional coarse wheeze on left .  No crackles, no rhonchi.  Non-labored breathing on 3L NC.  CV: Normal S1 and S2.  RRR.  2+ BLE pitting edema.  ABD: NABS, soft, nondistended.  GJ tube site not visualized today.  MSK: Moves all extremities.  + muscle wasting.   NEURO: Sleeping, easily wakes to voice, conversant.  SKIN: Warm, dry, fragile.  Scattered ecchymosis.  PSYCH: Mood stable.     Data:     LABS    CMP:   Recent Labs   Lab 03/02/24  1138 03/02/24  0623 03/01/24  2205 03/01/24  1542 03/01/24  0354 03/01/24  0351 02/29/24  2322 02/29/24  1608 02/29/24  1024 02/29/24  0332 02/28/24  1101 02/28/24  0558   NA  --  138  --   --   --  136  --   --   --  139  --  139   POTASSIUM  --  3.5  --   --   --  3.1*  --   --   --  3.6  --  3.4   CHLORIDE  --  101  --   --   --  99  --   --   --  98  --  98   CO2  --  27  --   --   --  29  --   --   --  30*  --  32*   ANIONGAP  --  10  --   --   --  8  --   --   --  11  --  9   * 126* 120*  --  145* 144*   < > 191*   < > 140*   < > 279*   BUN  --  46.9*  --   --   --  28.0*  --   --   --  70.2*  --  50.7*   CR  --  3.01*  --   --   --  1.86*  " "--   --   --  3.47*  --  2.60*   GFRESTIMATED  --  17*  --   --   --  30*  --   --   --  14*  --  20*   ESTUARDO  --  8.9  --   --   --  8.3*  --   --   --  8.9  --  8.5*   MAG  --  2.2  --  2.0  --  1.8  --  1.7  --  2.2   < > 1.9   PHOS  --  3.3  --  2.9  --  2.1*  --  2.2*  --  4.7*   < > 4.3   PROTTOTAL  --  5.3*  --   --   --  5.3*  --   --   --  5.2*  --  6.0*   ALBUMIN  --  3.1*  --   --   --  3.1*  --   --   --  3.0*  --  3.4*   BILITOTAL  --  0.2  --   --   --  0.3  --   --   --  0.2  --  0.2   ALKPHOS  --  70  --   --   --  63  --   --   --  64  --  81   AST  --  16  --   --   --  13  --   --   --  13  --  17   ALT  --  <5  --   --   --  7  --   --   --  8  --  10    < > = values in this interval not displayed.     CBC:   Recent Labs   Lab 03/02/24  0623 03/01/24  0351 02/29/24  1320 02/29/24  0556 02/28/24  0558   WBC 4.9 5.4  --  6.0 7.1   RBC 2.49* 2.32*  --  1.79* 2.14*   HGB 8.6* 8.3* 7.9* 6.6* 8.2*   HCT 29.1* 26.9* 25.4* 22.3* 28.1*   * 116*  --  125* 131*   MCH 34.5* 35.8*  --  36.9* 38.3*   MCHC 29.6* 30.9*  --  29.6* 29.2*   RDW 22.8* 23.7*  --  20.0* 20.6*    240  --  253 293       INR:   Recent Labs   Lab 02/29/24  0332 02/26/24  0611   INR 1.09 0.97       Glucose:   Recent Labs   Lab 03/02/24  1138 03/02/24  0623 03/01/24  2205 03/01/24  0354 03/01/24  0351 02/29/24  2322   * 126* 120* 145* 144* 134*       Blood Gas:   Recent Labs   Lab 03/02/24  0622 03/01/24  1542 03/01/24  0351   PHV 7.25* 7.28* 7.29*   PCO2V 77* 72* 72*   PO2V 59* 51* 43   HCO3V 34* 34* 34*   LINDY 5.8* 5.4* 6.3*   O2PER 35 28 35       Culture Data No results for input(s): \"CULT\" in the last 168 hours.    Virology Data:   Lab Results   Component Value Date    FLUAH1 Not Detected 02/18/2024    FLUAH3 Not Detected 02/18/2024    ZC2692 Not Detected 02/18/2024    IFLUB Not Detected 02/18/2024    RSVA Not Detected 02/18/2024    RSVB Not Detected 02/18/2024    PIV1 Not Detected 02/18/2024    PIV2 Not Detected " 02/18/2024    PIV3 Not Detected 02/18/2024    HMPV Not Detected 02/18/2024       Historical CMV results (last 3 of prior testing):  Lab Results   Component Value Date    CMVQNT Not Detected 02/19/2024    CMVQNT Not Detected 02/07/2024    CMVQNT Not Detected 01/10/2024     Lab Results   Component Value Date    CMVLOG 3.2 07/12/2023    CMVLOG <2.1 04/19/2023    CMVLOG 3.5 01/25/2023       Urine Studies    Recent Labs   Lab Test 02/18/24  0222 05/18/23  0627   URINEPH 7.5* 5.0   NITRITE Negative Negative   LEUKEST Trace* Moderate*   WBCU 66* 21*       Most Recent [place] Pulmonary Function Testing (FVC/FEV1 only)  FVC-Pre   Date Value Ref Range Status   02/07/2024 1.19 L    01/10/2024 1.12 L    08/29/2023 1.48 L    07/25/2023 1.55 L      FVC-%Pred-Pre   Date Value Ref Range Status   02/07/2024 42 %    01/10/2024 39 %    08/29/2023 53 %    07/25/2023 55 %      FEV1-Pre   Date Value Ref Range Status   02/07/2024 1.13 L    01/10/2024 1.10 L    08/29/2023 1.43 L    07/25/2023 1.54 L      FEV1-%Pred-Pre   Date Value Ref Range Status   02/07/2024 51 %    01/10/2024 49 %    08/29/2023 64 %    07/25/2023 69 %        IMAGING    Recent Results (from the past 48 hour(s))   MR Brain w/o & w Contrast    Narrative     MR BRAIN W/O & W CONTRAST 3/1/2024 5:38 PM    Provided History: patient with acute severe respiratory acidosis  without proper physiologic response - eval for stroke or other  etiology of central cause for decreased respiratory drive.    Comparison: CT head 2/18/2024.    Technique: Multiplanar T1-weighted, axial FLAIR, and susceptibility  images were obtained without intravenous contrast. Following  intravenous gadolinium-based contrast administration, axial  T2-weighted, diffusion, and T1-weighted images (in multiple planes)  were obtained.    Contrast: 5mL Gadavist     Findings:  There is no mass effect, midline shift, or evidence of intracranial  hemorrhage. The ventricles are proportionate to the cerebral  sulci.  Normal major vascular intracranial flow-voids. Scattered T2  hyperintensities throughout the periventricular and subcortical white  matter, nonspecific but likely sequelae of chronic small vessel  ischemic disease.    Postcontrast images demonstrate no abnormal intracranial enhancement.  No abnormal diffusion restriction. Susceptibility weighted images are  unremarkable.    No abnormality of the skull marrow signal. The visualized portions of  paranasal sinuses are relatively clear. Trace bilateral mastoid  effusions. The orbits are grossly unremarkable.      Impression    Impression:     1. No acute intracranial pathology.  2. Moderate leukoaraiosis.    I have personally reviewed the examination and initial interpretation  and I agree with the findings.    JUAN PRASAD MD         SYSTEM ID:  [id number]

## 2024-03-02 NOTE — PROGRESS NOTES
Vitals stable on 3L, BiPAP, alert and oriented x4, A1 with GB/Walker, anuric on HD T/Th/Sa, will have dialysis today, small loose BM this shift, coccyx.sacrum redness, mepilex in place, ankle/foot edema 3+, generalized edema 1+, TPN at 30 ml continuous, will continue to monitor

## 2024-03-02 NOTE — PROGRESS NOTES
Nephrology dialysis note    This patient was seen and examined while on dialysis. Laboratory results and nurses' notes were reviewed. HD today with goal 3.5L UF.     No changes to management of volume, anemia, BMD, acidosis, or electrolytes.    Diagnosis - [facility] admit for TPN now with PNA. Next HD likely Tuesday.    Dylan Bryan MD  [phone]

## 2024-03-03 LAB
ALBUMIN SERPL BCG-MCNC: 2.9 G/DL (ref 3.5–5.2)
ALP SERPL-CCNC: 79 U/L (ref 40–150)
ALT SERPL W P-5'-P-CCNC: <5 U/L (ref 0–50)
ANION GAP SERPL CALCULATED.3IONS-SCNC: 6 MMOL/L (ref 7–15)
AST SERPL W P-5'-P-CCNC: 14 U/L (ref 0–45)
BACTERIA BRONCH: NORMAL
BASE EXCESS BLDV CALC-SCNC: 5.4 MMOL/L (ref -3–3)
BASOPHILS # BLD AUTO: ABNORMAL 10*3/UL
BASOPHILS # BLD MANUAL: 0 10E3/UL (ref 0–0.2)
BASOPHILS NFR BLD AUTO: ABNORMAL %
BASOPHILS NFR BLD MANUAL: 0 %
BILIRUB SERPL-MCNC: 0.2 MG/DL
BUN SERPL-MCNC: 30.4 MG/DL (ref 8–23)
CALCIUM SERPL-MCNC: 8.3 MG/DL (ref 8.8–10.2)
CHLORIDE SERPL-SCNC: 102 MMOL/L (ref 98–107)
CREAT SERPL-MCNC: 2.13 MG/DL (ref 0.51–0.95)
DEPRECATED HCO3 PLAS-SCNC: 30 MMOL/L (ref 22–29)
EGFRCR SERPLBLD CKD-EPI 2021: 26 ML/MIN/1.73M2
EOSINOPHIL # BLD AUTO: ABNORMAL 10*3/UL
EOSINOPHIL # BLD MANUAL: 0.2 10E3/UL (ref 0–0.7)
EOSINOPHIL NFR BLD AUTO: ABNORMAL %
EOSINOPHIL NFR BLD MANUAL: 4 %
ERYTHROCYTE [DISTWIDTH] IN BLOOD BY AUTOMATED COUNT: 22.7 % (ref 10–15)
GLUCOSE SERPL-MCNC: 129 MG/DL (ref 70–99)
HCO3 BLDV-SCNC: 33 MMOL/L (ref 21–28)
HCT VFR BLD AUTO: 30.3 % (ref 35–47)
HGB BLD-MCNC: 9.1 G/DL (ref 11.7–15.7)
IMM GRANULOCYTES # BLD: ABNORMAL 10*3/UL
IMM GRANULOCYTES NFR BLD: ABNORMAL %
LYMPHOCYTES # BLD AUTO: ABNORMAL 10*3/UL
LYMPHOCYTES # BLD MANUAL: 0.4 10E3/UL (ref 0.8–5.3)
LYMPHOCYTES NFR BLD AUTO: ABNORMAL %
LYMPHOCYTES NFR BLD MANUAL: 11 %
MAGNESIUM SERPL-MCNC: 2.1 MG/DL (ref 1.7–2.3)
MAGNESIUM SERPL-MCNC: 2.3 MG/DL (ref 1.7–2.3)
MCH RBC QN AUTO: 36.4 PG (ref 26.5–33)
MCHC RBC AUTO-ENTMCNC: 30 G/DL (ref 31.5–36.5)
MCV RBC AUTO: 121 FL (ref 78–100)
MONOCYTES # BLD AUTO: ABNORMAL 10*3/UL
MONOCYTES # BLD MANUAL: 0.2 10E3/UL (ref 0–1.3)
MONOCYTES NFR BLD AUTO: ABNORMAL %
MONOCYTES NFR BLD MANUAL: 4 %
MYELOCYTES # BLD MANUAL: 0.1 10E3/UL
MYELOCYTES NFR BLD MANUAL: 3 %
NEUTROPHILS # BLD AUTO: ABNORMAL 10*3/UL
NEUTROPHILS # BLD MANUAL: 3 10E3/UL (ref 1.6–8.3)
NEUTROPHILS NFR BLD AUTO: ABNORMAL %
NEUTROPHILS NFR BLD MANUAL: 78 %
NRBC # BLD AUTO: 0 10E3/UL
NRBC # BLD AUTO: 0 10E3/UL
NRBC BLD AUTO-RTO: 0 /100
NRBC BLD MANUAL-RTO: 1 %
O2/TOTAL GAS SETTING VFR VENT: 35 %
OXYHGB MFR BLDV: 84 % (ref 70–75)
PCO2 BLDV: 72 MM HG (ref 40–50)
PH BLDV: 7.27 [PH] (ref 7.32–7.43)
PHOSPHATE SERPL-MCNC: 2 MG/DL (ref 2.5–4.5)
PHOSPHATE SERPL-MCNC: 2.5 MG/DL (ref 2.5–4.5)
PLAT MORPH BLD: ABNORMAL
PLATELET # BLD AUTO: 212 10E3/UL (ref 150–450)
PO2 BLDV: 53 MM HG (ref 25–47)
POLYCHROMASIA BLD QL SMEAR: SLIGHT
POTASSIUM SERPL-SCNC: 3.5 MMOL/L (ref 3.4–5.3)
PROT SERPL-MCNC: 5.1 G/DL (ref 6.4–8.3)
RBC # BLD AUTO: 2.5 10E6/UL (ref 3.8–5.2)
RBC MORPH BLD: ABNORMAL
SAO2 % BLDV: 87.1 % (ref 70–75)
SODIUM SERPL-SCNC: 138 MMOL/L (ref 135–145)
THEOPHYLLINE SERPL-MCNC: 6.8 UG/ML (ref 10–20)
WBC # BLD AUTO: 3.9 10E3/UL (ref 4–11)

## 2024-03-03 PROCEDURE — 94640 AIRWAY INHALATION TREATMENT: CPT | Mod: 76

## 2024-03-03 PROCEDURE — 250N000009 HC RX 250: Performed by: INTERNAL MEDICINE

## 2024-03-03 PROCEDURE — 99233 SBSQ HOSP IP/OBS HIGH 50: CPT | Performed by: NURSE PRACTITIONER

## 2024-03-03 PROCEDURE — 999N000157 HC STATISTIC RCP TIME EA 10 MIN

## 2024-03-03 PROCEDURE — 250N000013 HC RX MED GY IP 250 OP 250 PS 637

## 2024-03-03 PROCEDURE — 84100 ASSAY OF PHOSPHORUS: CPT

## 2024-03-03 PROCEDURE — 82805 BLOOD GASES W/O2 SATURATION: CPT | Performed by: NURSE PRACTITIONER

## 2024-03-03 PROCEDURE — 80198 ASSAY OF THEOPHYLLINE: CPT

## 2024-03-03 PROCEDURE — 36415 COLL VENOUS BLD VENIPUNCTURE: CPT

## 2024-03-03 PROCEDURE — 120N000002 HC R&B MED SURG/OB UMMC

## 2024-03-03 PROCEDURE — 83735 ASSAY OF MAGNESIUM: CPT

## 2024-03-03 PROCEDURE — 250N000012 HC RX MED GY IP 250 OP 636 PS 637: Performed by: PHYSICIAN ASSISTANT

## 2024-03-03 PROCEDURE — 99232 SBSQ HOSP IP/OBS MODERATE 35: CPT | Performed by: PSYCHIATRY & NEUROLOGY

## 2024-03-03 PROCEDURE — 36591 DRAW BLOOD OFF VENOUS DEVICE: CPT

## 2024-03-03 PROCEDURE — 85007 BL SMEAR W/DIFF WBC COUNT: CPT

## 2024-03-03 PROCEDURE — 94640 AIRWAY INHALATION TREATMENT: CPT

## 2024-03-03 PROCEDURE — 85027 COMPLETE CBC AUTOMATED: CPT

## 2024-03-03 PROCEDURE — 99232 SBSQ HOSP IP/OBS MODERATE 35: CPT | Mod: GC | Performed by: INTERNAL MEDICINE

## 2024-03-03 PROCEDURE — 250N000012 HC RX MED GY IP 250 OP 636 PS 637

## 2024-03-03 PROCEDURE — 94660 CPAP INITIATION&MGMT: CPT

## 2024-03-03 PROCEDURE — 250N000011 HC RX IP 250 OP 636

## 2024-03-03 PROCEDURE — 36415 COLL VENOUS BLD VENIPUNCTURE: CPT | Performed by: NURSE PRACTITIONER

## 2024-03-03 PROCEDURE — 250N000009 HC RX 250

## 2024-03-03 PROCEDURE — 80053 COMPREHEN METABOLIC PANEL: CPT

## 2024-03-03 RX ADMIN — Medication 40 MG: at 08:51

## 2024-03-03 RX ADMIN — CYANOCOBALAMIN TAB 500 MCG 500 MCG: 500 TAB at 08:50

## 2024-03-03 RX ADMIN — Medication 2.5 MCG: at 22:10

## 2024-03-03 RX ADMIN — LEVALBUTEROL HYDROCHLORIDE 1.25 MG: 1.25 SOLUTION RESPIRATORY (INHALATION) at 08:26

## 2024-03-03 RX ADMIN — CALCIUM CARBONATE 600 MG (1,500 MG)-VITAMIN D3 400 UNIT TABLET 1 TABLET: at 18:17

## 2024-03-03 RX ADMIN — MAGNESIUM SULFATE HEPTAHYDRATE: 500 INJECTION, SOLUTION INTRAMUSCULAR; INTRAVENOUS at 22:11

## 2024-03-03 RX ADMIN — PREDNISONE 5 MG: 5 TABLET ORAL at 08:49

## 2024-03-03 RX ADMIN — LIDOCAINE 4% 1 PATCH: 40 PATCH TOPICAL at 22:09

## 2024-03-03 RX ADMIN — LOPERAMIDE HYDROCHLORIDE 2 MG: 2 CAPSULE ORAL at 08:50

## 2024-03-03 RX ADMIN — THEOPHYLLINE 133 MG: 80 SOLUTION ORAL at 06:48

## 2024-03-03 RX ADMIN — PREDNISONE 2.5 MG: 2.5 TABLET ORAL at 22:11

## 2024-03-03 RX ADMIN — HEPARIN SODIUM 5000 UNITS: 5000 INJECTION, SOLUTION INTRAVENOUS; SUBCUTANEOUS at 12:29

## 2024-03-03 RX ADMIN — Medication 2.5 MCG: at 08:49

## 2024-03-03 RX ADMIN — TACROLIMUS 4 MG: 5 CAPSULE ORAL at 18:17

## 2024-03-03 RX ADMIN — CALCIUM CARBONATE 600 MG (1,500 MG)-VITAMIN D3 400 UNIT TABLET 1 TABLET: at 12:29

## 2024-03-03 RX ADMIN — Medication 40 MG: at 22:10

## 2024-03-03 RX ADMIN — CALCIUM CARBONATE 600 MG (1,500 MG)-VITAMIN D3 400 UNIT TABLET 1 TABLET: at 08:49

## 2024-03-03 RX ADMIN — LEVALBUTEROL HYDROCHLORIDE 1.25 MG: 1.25 SOLUTION RESPIRATORY (INHALATION) at 20:08

## 2024-03-03 RX ADMIN — TACROLIMUS 4 MG: 5 CAPSULE ORAL at 08:51

## 2024-03-03 RX ADMIN — LEVOTHYROXINE SODIUM 25 MCG: 0.03 TABLET ORAL at 08:49

## 2024-03-03 ASSESSMENT — ACTIVITIES OF DAILY LIVING (ADL)
ADLS_ACUITY_SCORE: 36

## 2024-03-03 NOTE — PROGRESS NOTES
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 On 2/17/24 she was transferred to ICU for worsening mental status and acute hypoxic and hypercarbic respiratory failure inspite of BiPAP requiring intubation. She was suspected to have PNA and started on antibiotics. Extubated on 2/19 without complications and tolerated iHD on 2/20, and tolerated PO regular diet in addition to TPN. Developed progressively worsening respiratory acidosis and hypercarbia, and was re-admitted to ICU on 2/28/24 for close monitoring of intermittent BiPAP and possible intubation, however she improved on AVAPS setting and well enough to transfer back to medicine floor on 2/29/24.      Changes today:   - will stop theophylline today and monitor  - continue Bipap while sleeping (and with naps).  If mental status worsens, will use Bipap during the day too as it may indicate worsening hypercarbia  - 3L O2 for meals and breaks from BiPAP  - trending VBGs daily  - appreciated transplant pulmonary recommendations  - Continue thyroid replacement per ICU team recommendations, repeat thyroid function  labs in one week (3/6) for reassessment  - will plan for discussion with renal team and IR about port vs PICC line placement for TPN prior to discharge (non-internal jugular access would be preferred for discharge if able to tolerate)  - next plan for dialysis is Tuesday 3/5 - monitor for volume status and VBG  - may need to use BiPAP at home - ensure patient has ability to do BiPAP at home prior to discharge and has urgent sleep clinic referral      #Severe respiratory acidosis, improved on BiPAP  #Acute hypoxic and hypercarbic respiratory failure  #S/P Lung transplant 2022 c/b right hemidiaphragm palsy  #Recurrent pneumonia, resolved  Patient received a bilateral lung transplant 6/28/22 for COPD. Was admitted 2/10 to address malnutrition and admitted to ICU on 2/17 with hypoxic hypercarbic respiratory failure. Intubated on 2/18 AM  due to worsening oxygen requirements, likely due to pulmonary edema given hx of ESRD requiring HD vs infection given hx of lung transplant, immunosuppressed status, and recurrent pneumonias. Extubated on 2/19 without complications,   Micro Follow-up on BAL, viral panel, CMV, fungus, and Blood Cultures show no abnormalities. Does have slowly rising pCO2 on VBG - HFNC did not seem to improve elevated pCO2 and instead seemed to potentially cause worsening, possibly due to depressed respiratory drive from high O2. Patient initially was adamant that she would not tolerate BiPAP, however was agreeable to trial on 2/27 PM. Despite intermittent BiPAP overnight, VBG was worse and patient transferred to ICU on 2/28 AM. RT adjusted BiPAP settings while in ICU to AVAPS with improvement in mental status and VBG, and patient transferred back to medicine floor on 2/29. ICU team additionally added theophylline and thyroid replacement as both can help with diaphragmatic/respiratory muscle weakness in setting of COPD and malnutrition. MRI negative for central problems with respiratory drive.   -  transplant pulmonary team following  - neurology consulted to assist with evaluation for possible central etiology of hypercapnic respiratory failure - MRI negative, no central concern  - PRN hypertonic saline inhaler with albuterol nebs  - Continue good pulm toilet  - Transplant pulmonology following, recs appreciated  - PTA immunosuppressive agent  >Prednisone 5 mg every morning, 2.5 mg every afternoon; tacrolimus 4 mg every morning, 4mg every afternoon  > Monitor tacro levels, goal 7-9  > - overnight oximetry study suggestive of O2 vs CPAP need, as did require up to 2LPM overnight to prevent hypoxia  > Try to minimize O2 to preserve respiratory drive, will give IVIG for DSA+   - avoid HFNC, maintain minimum oxygen to keep SaO2 >85%   - continue BiPAP when sleeping (overnight and during naps)  - MIP/MEP testing tomorrow  - CXR 2-view ordered tomorrow  - PTA dapsone MWF for PJP prophylaxis  - completed course of zosyn for empiric HAP course (2/17 - 2-23)  - Initial decompensation likely from opioids - limit medications that would depress respiration   - Will discontinue theophylline due to difficulty attaining therapeutic levels and monitor off of theophylline  - continue thyroid function as below  - awaiting metabolic CART study results  - may need to use BiPAP at home - ensure patient has ability to do BiPAP at home prior to discharge and has urgent sleep clinic referral        Antibiotics:     Vancomycin (2/17 - 2/17)  Zosyn (2/17 - 2/23)  Dapsone MWF, PJP ppx   Micafungin (2/18- 2/21)     Immunosuppression  Tacrolimus  Prednisone       #Severe malnutrition   #FTT   #Hypoalbuminemia  #Gastroparesis, small bowel dysmotility  #S/P PEG/J with intolerance of enteral nutrition  # Chronic osmotic diarrhea/SIBO s/p Rifaximin > improving     Patient with gastroparesis (presumed due to vagal injury) and small bowel dysmotility complicated by unintentional 40lb weight loss over the past year and now severe malnutrition.  Previously intolerant to oral food intake due to nausea. Was initially admitted for portacath and TPN initiation since 2/13. After extubation has been able to tolerate feeds without n/v from 2/20. Electrolytes trending towards baseline today.  Per GI, next steps for workup for malnutrition would include CT enterography to evaluate for anatomic abnormalities contributing to malnutrition vs possible treatment for SIBO (though patient has had multiple courses of treatment in the past with no long term improvement). Patient couldn't tolerate the oral load for CT enterography on 2/21, so will defer this until she is able to tolerate greater PO load. On 2/23 , again discussed with patient the need of small bowel motility study if not improving outpatient through [place]. No ongoing concerns for SIBO on 2/23 as patient is passing multiple episodes of stools and gas with no abdominal pain or distention. C-diff test negative on 2/21. Per RD, patient tolerating >300 kcal of intake PO currently, so stopped trickle Tube feeds and continuing with PO and TPN for nutrition.   - Regular diet + increased TPN +  no further tube feeds per RD & transplant pul discussion               - Nephrology: limit fluid < 1.5 L per day for TPN ( chart vol of TPN in the I/O)               - RD concerned pt is not absorbing any oral intake and they will be monitoring Bowel function, I/O and, PO/TF/TPN intake.               -  stopped TF as PO intake sufficient for preventing gut atrophy  - CT enterography with contrast- Pt not able to tolerate oral contrast load of 1500 ml on 2/21; no ongoing concerns for SIBO, would need small bowel motility study if not improving outpatient through [place]  - Daily Weights  - monitor CMP in the AM      #Acute on chronic Anemia 2/2 ESRD  Hgb have been stable 7-8s, with no acute signs of bleeding, acute drop 2/29 from 8.2 > 6.6 after two rechecks, no overt signs of bleeding noted, patient reports some abdominal pain but  otherwise physical exam unremarkable.  LUE US negative for DVT 2/18.    - continue epogen per nephrology with dialysis  - venofer 50 mcg qweek with dialysis  - type and screen performed 2/29, 1 unit pRBCs ordered and transfused      #ESRD on HD T/Th/Sat  #Hypervolemic hyponatremia  #Anion gap metabolic acidosis - resolved  Patient is ESRD on T/Th/Sat HD, Hgb stabilizing. HD tolerating well. Continuing monitoring electrolytes daily. Anion gap normal since 2/21. Will continue to monitor daily labs/ABG  for refeeding syndrome and acidosis  - Continue Venofer injections    - CBC and CMP daily  - Continue epogen dose 8000 units as per nephrology  - Strict I/Os  - HD T/TH/Sat vs daily UF per nephrology       # Elevated TSH and low T4 and T3  Patient with new low T4 at 0.64 and TSH at 6.5, concerning for new hypothyroidism vs sick thyroid syndrome. TSH with appropriate response, more consistent with elevation in the setting of acute illness. ICU team on 2/28 recommended initiation of treatment for possible hypothyroid as this can improve respiratory muscle function in the setting of weakness and malnutrition.   - continue liothyronine and levothyroxine per ICU team recommendations  - repeat thyroid function studies in one week, adjust dosing as needed      #Left upper extremity unilateral edema, improving   #Bilateral pedal and ankle edema, improving  Edema due to third spacing due to hypoalbuminemia vs HF. BNP >68331 at admission, Echo on 2/18 shows EF of 55-60% with IVC of <2.1 and collapsing >50% with sniff, normal RA pressure, no significant valvular abnormalities ;  Left upper extremity swelling and  pitting lower extremity edema likely due to hypoalbuminemia improved today. Upper limb duplex USG on 2/18 negative for DVT. Wt went from 43.4 kg on admission to 47.4 kg today. She is urinating but cannot chart as she usually urinates with BM. She reports trending to baseline with urination. She denies dysuria, retention  symptoms. USG left arm on 2/21 shows steel physiology and Vascular Surgery consulted. Vascular surgery has only minor concerns for steal symptoms and given that the AVF is working well, they are okay with outpatient fistulograms/ venoplasty with wrist brachial index and PPG's, unless new concerns arise. LUE and LE edema significantly decreased since yesterdays HD. No new tingling/ numbness noticed.  - Continue daily weights  - Strict I/Os  - Elevation and wrapping of only lower legs as needed and increased UF per nephrology for volume management; no wrapping of Left upper extremity with dialysis fistula  - lymphedema consulted for BLE edema       #Steal physiology of LUE dialysis access fistula  LUE arterial US obtained 2/21 with concern for LUE edema. US of fistula demonstrated steal physiology. Discussed with nephrology, and vascular surgery consulted on 2/22. Vascular surgery has only minor concerns for steal symptoms and given that the AVF is working well, they are okay with outpatient fistulograms/ venoplasty with wrist brachial index and PPG's, unless new concerns arise.  - plan for outpatient workup as above; nephrology in agreement with outpatient workup.       #Facial and neck bruising  #Pain  Reports soreness in her neck from lying in bed. No soreness in the buttocks/ back. Patient has multiple bruises over her arms and face which is attributed to previous falls. No new bruising reported today. Patients reports her headaches are improving with tylenol. Using heating pads and lidocaine patch for body pains. Couple of small skin tears noted by the Rn's. Skin care done accordingly.  - Tylenol Q4  - Lidocaine patch prn  - Heating pads prn  - Diligent skin cares       #HTN  #A-fib, resolved  Noted atrial fibrillation on arrival with HR elevated at 150, not sustained for > 10 minutes and otherwise hemodynamically stable. Rates stable in the 80's. BP normalizing since 2/22.  - PTA metoprolol 25mg BID - holding  for now with lower BP with increased UF per nephrology, resume if becoming more hypertensive  - Continuous telemetry       # Encephalopathy secondary to hypercarbia - resolved  Patient was progressively more lethargic on 2/17 during admission in [facility]. Etiology likely secondary to hypercarbia in context of respiratory failure as patient was noted to have high CO2s concomitant with lethargy. Though receiving oxycodone and fentanyl, lethargy was only partially alleviated by narcan. LFTs and ammonia wnl with low suspicion of hepatic encephalopathy. BUN wnl with low suspicion for uremic encephalopathy. Head CT on 2/18 was negative with low suspicion of acute intracranial pathology. Patient is awake, alert, oriented and following commands since 2/20.        # Right hip fracture s/p ORIF (December 2023)   - PT/OT       # GERD  - PTA PPI       # Hx EBV viremia   # Hypogammaglobulinemia  # Chronic immunosuppression 2/2 lung transplant  Patient has been afebrile and has not had leukocytosis however she is under immunosuppression. Given acute respiratory failure and hx of recurrent pneumonias, she was started on empiric abx for concerns over new pneumonia. RVP and cultures no growth to date. Last day of empirical treatment for HAP.  - EBV 27K (decreased compared to prior)         Lines/tubes/drains:  - CVC RIJ (for TPN, is tunneled line)   - PIV x2  - PEG/J  - HD AV fistula, left arm         Diet:   Regular diet PO  Holding trickle tube feeds if persistent PO intake >300 kcal/day  Majority of nutrition through TPN per RD     General Cares/Prophylaxis:    DVT Prophylaxis: Heparin subcutaneous - resuming with stable Hgb  GI Prophylaxis: PPI  Fluids: As per TPN  Code Status: Full        Diet: Renal Diet (dialysis)  parenteral nutrition - ADULT compounded formula    DVT Prophylaxis: resume subQ heparin  Dong Catheter: Not present  Fluids: Per TPN and PO  Lines: PRESENT      CVC Single Lumen Right Internal jugular  Non - valved (open ended);Tunneled;Power injectable-Site Assessment: WDL  Hemodialysis Vascular Access Arteriovenous graft Superior Arm-Site Assessment: WDL      Cardiac Monitoring: None  Code Status: Full Code      Clinically Significant Risk Factors              # Hypoalbuminemia: Lowest albumin = 2.6 g/dL at 2/18/2024  5:13 AM, will monitor as appropriate             # Severe Malnutrition: based on nutrition assessment      # Financial/Environmental Concerns: none         Disposition Plan         The patient's care was discussed with the Attending physician Dr. Delacruz, Bedside Nurse, Patient, and Transplant Pulm Consultant(s).    Jannet Isaac MD  Internal Medicine Resident, PGY-3  [address]  Securely message with [employer] (more info)  Text page via [employer] Paging/Directory   See signed in provider for up to date coverage information  ______________________________________________________________________    Interval History   No acute events overnight. VBG remaining stable with pCO2 in 70s with BiPAP off during the day and on overnight. Breathing feels overall well today. Swelling is somewhat worse in bilateral feet and legs, does have discomfort when moving with swelling. No new fevers or chills. No cough. No nausea. Tolerating PO.       Physical Exam   Vital Signs: Temp: 98.6  F (37  C) Temp src: Oral BP: 139/66 Pulse: 96   Resp: 18 SpO2: 100 % O2 Device: Nasal cannula Oxygen Delivery: 3 LPM  Weight: 110 lbs 10.73 oz    General: thin, sleeping on arrival but awakens easily to voice, laying in bed, able to fully participate in conversation  HEENT: Normocephalic, bruising on the right face around right orbit with hematoma and right neck, improved  Neuro: very awake and alert, fully oriented, following commands, answering questions clearly and easily, moving all extremities  Pulm/Resp: breathing on NC, diminished lung sounds in bases  bilaterally, no increased work of breathing  CV: RRR, warm extremities, 1+ pitting edema in left hand, no edema in right arm or hand, 1-2+ pitting edema in distal bilateral lower extremities  Abdomen: Non-distended, PEG in place, non-tender  Incisions/Skin: Bruising to face and right forearm.     Medical Decision Making       Please see A&P for additional details of medical decision making.      Data     I have personally reviewed the following data over the past 24 hrs:    3.9 (L)  \   9.1 (L)   / 212     138 102 30.4 (H) /  129 (H)   3.5 30 (H) 2.13 (H) \     ALT: <5 AST: 14 AP: 79 TBILI: 0.2   ALB: 2.9 (L) TOT PROTEIN: 5.1 (L) LIPASE: N/A       Imaging results reviewed over the past 24 hrs:   No results found for this or any previous visit (from the past 24 hour(s)).

## 2024-03-03 NOTE — PROGRESS NOTES
Pulmonary Medicine  Cystic Fibrosis - Lung Transplant Team  Progress Note  2024     Patient: Sofie Rodriguez  MRN: [id number]  : 1960 (age 63 year old)  Transplant: 2022 (Lung), POD#614  Admission date: 2/10/2024    Assessment & Plan:     Sofie Rodriguez is a 63 year old female with h/o COPD s/p BSLT () with course complicated by post-operative hemidiaphragm palsy, recurrent PNAs, positive DSA, EBV viremia, hypogammaglobulinemia, severe gastroparesis s/p G/J tube placement (22) and pyloric botox (23), GI bleed 2/2 pyloric ulcer, hemobilia s/p ERCP and MRCP, chronic diarrhea, recurrent C diff colitis, ESRD on iHD, recurrent falls with injuries (recent right hip fx s/p ORIF 2023), deconditioning, and FTT.  Admitted 2/10 for initiation of TPN/lipids.  Transferred to ICU  for emergent intubation for hypoxic and hypercapneic respiratory failure with severe respiratory acidosis and encephalopathy.  iHD increased, infectious workup unremarkable.  Extubated .  Continues with persistent and progressive hypercapnia with severe acidosis, returned to ICU - d/t tenuous respiratory status.  Improvement noted with increase in tolerance of NIPPV, now off during the day with stable blood gases.  Discharge pending nutrition plan (likely ongoing TPN/lipids) and stability with regular iHD schedule given additional volume with parenteral nutrition.     Today's recommendations:  - Follow pending blood cultures (), sputum cultures ordered if able to collect  - AVAPS with  ml overnight and with daytime naps, NC during the day, reasonable to decrease to daily VBG given persistent stability with daytime NC  - MIP/MEP assessment tomorrow  - Repeat 2-view CXR tomorrow  - Tacrolimus via G tube, repeat level ordered 3/5  - Repeat DSA ordered 3/6  - Repeat CMV, Prospera, and EBV ordered 3/18  - TPN/lipids per primary, anticipate continued need after discharge given GI concerns for  negligible enteral nutritional absorption potential     Acute on chronic hypoxic/hypercapneic respiratory failure:  S/p bilateral sequential lung transplant for COPD:  Right hemidiaphragm palsy:   Hypoventilation, Suspected CARLEE: CT PTA (2/7) with decreased MARLON opacities but new tree in bud RLL opacities.  PFTs unchanged in clinic (but ATS criteria not met), remain significantly below her baseline.  Baseline hypoxia with 2L NC overnight.  Respiratory decompensation with encephalopathy and severe respiratory acidosis on 2/17.  S/p intubation 2/17-2/19.  Repeat CT (2/17) with new patchy consolidative and nodular opacities, GGO primarily in the bases and intralobular septal thickening (concerning for pulmonary edema given recent addition of TPN nutrition, new infection, PTLD, and/or septic emboli.  Bronch with lavage of RML (2/18) with very friable tissue, cutlures only with C. glabrata.  S/p empiric Zosyn (2/17-2/24) and micafungin (2/18-2/21).  Severe respiratory acidosis with hypercapnia persisting with slight improvement with NIPPV treatment, limited by pt. tolerance to pressure and mask (claustrophobia).  Persistent respiratory failure and variable encephalopathy also complicated by diaphragmatic weakness, hypoventilation, and deconditioning d/t malnutrition.  Repeat CT (2/26) with diffuse GG and consolidative opacities >BLL and diffuse interlobular septal thickening.  Intermittent tolerance of NIPPV persists, reports some improvement in comfort with transition from BiPAP to AVAPS.  Neurology consulted 2/27.  Procal increased to 1.56 (2/28).  Transferred to ICU 2/28 given tenuous respiratory status and potential need for reintubation.  Improvement noted with continuous NIPPV with minimal interruptions (sodium bicarb also stopped, likely partially contributing), trial off NIPPV during the day started 2/29.  MRI brain (3/1) with moderate leukoaraiosis, no acute intracranial pathology.  - Blood cultures (2/28) NGTD  -  Sputum cultures ordered if able to collect  - Xopenex BID (2/27)  - Defer ABX at this time  - AVAPS with  ml overnight and with daytime naps, NC during the day, reasonable to decrease to daily VBG given persistent stability with daytime NC  - MIP/MEP assessment tomorrow  - Repeat 2-view CXR tomorrow  - Sleep clinic eval indicated as OP     Immunosuppression:  ImmuKnow low at 108 on 1/10.  AZA previously stopped 5/2023 d/t low ImmuKnow assay and EBV viremia.  Repeat ImmuKnow (2/27) low at 88, defer dose adjustment (tacro goal already decreased one day prior).  - Tacrolimus 4 mg BID via G tube (decreased 3/2).  Goal level 6-8 (decreased 2/26 d/t ImmuKnow and concern for infection).  Repeat level ordered 3/5.  - Prednisone 5 mg qAM / 2.5 mg qPM     Prophylaxis:   - Dapsone 50 mg q MWF for PJP ppx  - CMV ppx not currently indicated, D+/R+, CMV negative 2/19 (BAL very mildly positive 2/18, likely not clinically significant), repeat CMV ordered 3/18     Positive DSA: PFTs and pulmonary symptoms have remained stable as OP, so [facility] treatment deferred given frailty.  Most recent DSA (2/7) with DQB2 mfi 6966 (from 5723 one month prior).  Most recent cell-free DNA Prospera (2/18) mildly elevated at 1.04 (concerning for possible rejection), which was increased from prior level of 0.12 (1/10).  IST increase deferred at that time per Dr. Gong.  S/p IVIG (2/27) for DSA (IgG WNL).  - Repeat DSA ordered 3/6  - Repeat Prospera ordered 3/18      EBV viremia: CT CAP (2/7) without lymphadenopathy.  Most recent level (2/18) improved to 28k from 96k (2/7)  - Repeat EBV ordered 3/18     Other relevant problems being managed by the primary team:      FTT:  Severe protein calorie malnutrition:  Gastroparesis s/p PEG/J, botox, and G-POEM:  SB hypomotility:  Pyloric ulcer:  Chronic nausea and osmotic diarrhea:  SIBO s/p rifaximin:   Recurrent C diff colitis: Chronic osmotic and infectious diarrhea since transplant with recurrent episodes  of C diff.  Notable weight loss (40# in a year) d/t diarrhea, GI dysmotility, and intolerance of enteral feeds (PEG/J tube in place), most recently on elemental formula.  Extensive OP eval and f/u with GI.  S/p port placement for TPN and lipids.  Tolerating modest PO intake (likely absorbing minimal per GI), monitoring for refeeding syndrome.    - PO diet for pleasure only  - TPN/lipids per primary, anticipate continued need after discharge given GI concerns for negligible enteral nutritional absorption potential  - CT enterography recommended per GI, but unlikely to be able to tolerate the required 1.5 L enteral contrast bolus so deferring for now     ESRD: CT scan (with declining respiratory status) with volume overload secondary to TPN volume, iHD increased from PTA TThSa schedule with unsustained improvement.  Management per nephrology, dialysis via [facility] C[facility].     We appreciate the excellent care provided by the [place] [id number] team.  Recommendations communicated via telephone and this note.  Will continue to follow along closely, please do not hesitate to call with any questions or concerns.     Patient discussed with Dr. Gong.    Catherine Johnson, DNP, APRN, CNP  Inpatient Nurse Practitioner  Pulmonary CF/Transplant     Subjective & Interval History:     Remains on AVAPS overnight with 2-3L NC during the day, morning VBG (on NIPPV) and evening VBG (on NC) have remained stable with little variance and pCO2 stable in 70's (likely new baseline).  No new cough or sputum.  Eating some, occasional nausea, infrequent diarrhea.  TPN/lipids continue.  HD run for 2.9L off yesterday.    Review of Systems:     C: No fever, no chills  INTEGUMENTARY/SKIN: No rash or obvious new lesions  ENT/MOUTH: No nasal congestion or drainage  RESP: See interval history  CV: No chest pain, no palpitations, + peripheral edema, no orthopnea  GI: No reflux symptoms  : Oliguric  MUSCULOSKELETAL: No myalgias, no arthralgias  ENDOCRINE:  "Blood sugars with adequate control  NEURO: No headache  PSYCHIATRIC: Mood stable    Physical Exam:     All notes, images, and labs from past 24 hours (at minimum) were reviewed.    Vital signs:  Temp: 98.6  F (37  C) Temp src: Oral BP: 139/66 Pulse: 96   Resp: 18 SpO2: 100 % O2 Device: Nasal cannula Oxygen Delivery: 2 LPM Height: 157 cm (5' 1.81\") Weight: 50.2 kg (110 lb 10.7 oz)  I/O:   Intake/Output Summary (Last 24 hours) at 3/3/2024 0906  Last data filed at 3/3/2024 0600  Gross per 24 hour   Intake 692.55 ml   Output 2900 ml   Net -2207.45 ml     Constitutional: Lying in bed, in no apparent distress.   HEENT: Eyes with pink conjunctivae, anicteric.  Oral mucosa moist without lesions.   PULM: Good air flow bilaterally.  No crackles, no rhonchi, no wheezes.  Non-labored breathing on 2L NC.  CV: Normal S1 and S2.  RRR.  No murmur, gallop, or rub.  2+ BLE pitting edema.   ABD: NABS, soft, nontender, nondistended.  PEG/J tube site cdi.  MSK: Moves all extremities.  + muscle wasting.   NEURO: Sleeping but easily arousable.   SKIN: Warm, dry, fragile, scattered ecchymosis.   PSYCH: Mood stable.     Data:     LABS    CMP:   Recent Labs   Lab 03/03/24  0551 03/02/24  1623 03/02/24  1138 03/02/24  0623 03/01/24  2205 03/01/24  1542 03/01/24  0354 03/01/24  0351 02/29/24  1024 02/29/24  0332     --   --  138  --   --   --  136  --  139   POTASSIUM 3.5  --   --  3.5  --   --   --  3.1*  --  3.6   CHLORIDE 102  --   --  101  --   --   --  99  --  98   CO2 30*  --   --  27  --   --   --  29  --  30*   ANIONGAP 6*  --   --  10  --   --   --  8  --  11   *  --  160* 126* 120*  --    < > 144*   < > 140*   BUN 30.4*  --   --  46.9*  --   --   --  28.0*  --  70.2*   CR 2.13*  --   --  3.01*  --   --   --  1.86*  --  3.47*   GFRESTIMATED 26*  --   --  17*  --   --   --  30*  --  14*   ESTUARDO 8.3*  --   --  8.9  --   --   --  8.3*  --  8.9   MAG 2.1 1.8  --  2.2  --  2.0  --  1.8   < > 2.2   PHOS 2.0* 2.1*  --  3.3  --  " "2.9  --  2.1*   < > 4.7*   PROTTOTAL 5.1*  --   --  5.3*  --   --   --  5.3*  --  5.2*   ALBUMIN 2.9*  --   --  3.1*  --   --   --  3.1*  --  3.0*   BILITOTAL 0.2  --   --  0.2  --   --   --  0.3  --  0.2   ALKPHOS 79  --   --  70  --   --   --  63  --  64   AST 14  --   --  16  --   --   --  13  --  13   ALT <5  --   --  <5  --   --   --  7  --  8    < > = values in this interval not displayed.     CBC:   Recent Labs   Lab 03/03/24  0551 03/02/24  0623 03/01/24  0351 02/29/24  1320 02/29/24  0556   WBC 3.9* 4.9 5.4  --  6.0   RBC 2.50* 2.49* 2.32*  --  1.79*   HGB 9.1* 8.6* 8.3* 7.9* 6.6*   HCT 30.3* 29.1* 26.9* 25.4* 22.3*   * 117* 116*  --  125*   MCH 36.4* 34.5* 35.8*  --  36.9*   MCHC 30.0* 29.6* 30.9*  --  29.6*   RDW 22.7* 22.8* 23.7*  --  20.0*    236 240  --  253       INR:   Recent Labs   Lab 02/29/24  0332 02/26/24  0611   INR 1.09 0.97       Glucose:   Recent Labs   Lab 03/03/24  0551 03/02/24  1138 03/02/24  0623 03/01/24  2205 03/01/24  0354 03/01/24  0351   * 160* 126* 120* 145* 144*       Blood Gas:   Recent Labs   Lab 03/03/24  0550 03/02/24  1623 03/02/24  0622   PHV 7.27* 7.24* 7.25*   PCO2V 72* 76* 77*   PO2V 53* 92* 59*   HCO3V 33* 32* 34*   LINDY 5.4* 3.2* 5.8*   O2PER 35 35 35       Culture Data No results for input(s): \"CULT\" in the last 168 hours.    Virology Data:   Lab Results   Component Value Date    FLUAH1 Not Detected 02/18/2024    FLUAH3 Not Detected 02/18/2024    GG5543 Not Detected 02/18/2024    IFLUB Not Detected 02/18/2024    RSVA Not Detected 02/18/2024    RSVB Not Detected 02/18/2024    PIV1 Not Detected 02/18/2024    PIV2 Not Detected 02/18/2024    PIV3 Not Detected 02/18/2024    HMPV Not Detected 02/18/2024       Historical CMV results (last 3 of prior testing):  Lab Results   Component Value Date    CMVQNT Not Detected 02/19/2024    CMVQNT Not Detected 02/07/2024    CMVQNT Not Detected 01/10/2024     Lab Results   Component Value Date    CMVLOG 3.2 " 07/12/2023    CMVLOG <2.1 04/19/2023    CMVLOG 3.5 01/25/2023       Urine Studies    Recent Labs   Lab Test 02/18/24  0222 05/18/23  0627   URINEPH 7.5* 5.0   NITRITE Negative Negative   LEUKEST Trace* Moderate*   WBCU 66* 21*       Most Recent [place]ze Pulmonary Function Testing (FVC/FEV1 only)  FVC-Pre   Date Value Ref Range Status   02/07/2024 1.19 L    01/10/2024 1.12 L    08/29/2023 1.48 L    07/25/2023 1.55 L      FVC-%Pred-Pre   Date Value Ref Range Status   02/07/2024 42 %    01/10/2024 39 %    08/29/2023 53 %    07/25/2023 55 %      FEV1-Pre   Date Value Ref Range Status   02/07/2024 1.13 L    01/10/2024 1.10 L    08/29/2023 1.43 L    07/25/2023 1.54 L      FEV1-%Pred-Pre   Date Value Ref Range Status   02/07/2024 51 %    01/10/2024 49 %    08/29/2023 64 %    07/25/2023 69 %        IMAGING    Recent Results (from the past 48 hour(s))   MR Brain w/o & w Contrast    Narrative     MR BRAIN W/O & W CONTRAST 3/1/2024 5:38 PM    Provided History: patient with acute severe respiratory acidosis  without proper physiologic response - eval for stroke or other  etiology of central cause for decreased respiratory drive.    Comparison: CT head 2/18/2024.    Technique: Multiplanar T1-weighted, axial FLAIR, and susceptibility  images were obtained without intravenous contrast. Following  intravenous gadolinium-based contrast administration, axial  T2-weighted, diffusion, and T1-weighted images (in multiple planes)  were obtained.    Contrast: 5mL Gadavist     Findings:  There is no mass effect, midline shift, or evidence of intracranial  hemorrhage. The ventricles are proportionate to the cerebral sulci.  Normal major vascular intracranial flow-voids. Scattered T2  hyperintensities throughout the periventricular and subcortical white  matter, nonspecific but likely sequelae of chronic small vessel  ischemic disease.    Postcontrast images demonstrate no abnormal intracranial enhancement.  No abnormal diffusion restriction.  Susceptibility weighted images are  unremarkable.    No abnormality of the skull marrow signal. The visualized portions of  paranasal sinuses are relatively clear. Trace bilateral mastoid  effusions. The orbits are grossly unremarkable.      Impression    Impression:     1. No acute intracranial pathology.  2. Moderate leukoaraiosis.    I have personally reviewed the examination and initial interpretation  and I agree with the findings.    JUAN PRASAD MD         SYSTEM ID:  [id number]

## 2024-03-03 NOTE — PROGRESS NOTES
"[facility]  General Neurology - Progress Note    Patient Name:  Sofie Rodriguez  Date of Service:  March 3, 2024    Subjective:    No acute events overnight.  Since neurology saw patient last week patient has been in and out of the ICU for worsening mental status in the setting of hypercarbia.  This morning the patient was doing well patient has been on AVAPS for hypercarbia and has tolerated it well.  MRI of the brain with and without contrast was completed without any acute intracranial pathology to explain the patient's lack of central cause of physiological response to hypercarbia.  Patient's neuroexam remains nonfocal.    Objective:    Vitals: /66 (BP Location: Right arm)   Pulse 96   Temp 98.6  F (37  C) (Oral)   Resp 18   Ht 1.57 m (5' 1.81\")   Wt 50.2 kg (110 lb 10.7 oz)   SpO2 100%   BMI 20.37 kg/m    General: Lying in bed, NAD on O2  Head: NC/AT  Eyes: no icterus, op pink and moist  Cardiac: RRR. Extremities warm, no edema.   Respiratory: non-labored on RA  GI: S/NT/ND  Skin: No rash or lesion on exposed skin  Psych: Mood pleasant, affect congruent  Neuro:  Mental status: Awake, alert, attentive, oriented to self, time, place, and circumstance. Language is fluent and coherent with intact comprehension of complex commands, naming and repetition.  Cranial nerves: VFF, PERRL, conjugate gaze, EOMI, facial sensation intact, face symmetric, shoulder shrug strong, tongue/uvula midline, no dysarthria.   Motor: Normal  tone and decreased bulk. No abnormal movements.  4+ /5 strength bilaterally in deltoids, biceps, triceps, hand , hip flexors, hip extensors, knee flexion, knee extension, plantarflexion, dorsiflexion.  5 out of 5 strength with neck flexion (confounded by pain but able to give 5 out of 5 initially) and 5 out of 5 strength in neck extension  Reflexes: Hypo-reflexic and symmetric biceps, brachioradialis, triceps, patellae, and achilles. no clonus, t " bilateral upgoing toes.  Sensory: Intact to light touch, in proximal and distal aspects of all 4 extremities   Coordination: FNF without dysmetria.   Gait: Deferred.    Pertinent Investigations:    I have personally reviewed most recent and pertinent labs, tests, and radiological images.   MRI brain with and without contrast.  1. No acute intracranial pathology.  2. Moderate leukoaraiosis.       Assessment:  #Acute hypoxic and hypercarbic respiratory failure.  On examining the patient it was reassuring that she had a nonfocal neurological exam.  But it was concerning that the patient had bilateral upgoing toes.  The patient is retaining carbon dioxide and is not having a physiological central response to increased respiratory rate to blow off CO2.  It is hard to localize a specific area of the brain that can cause such as a condition.  A brainstem lesion in the medullary respiratory center would result in something like an Ondines curse where the patient would have to voluntarily consciously breathe in order to maintain respirations but this is not the case here as the patient is able to breathe and looks comfortable.  Other differential would be a neuromuscular process where the patient's muscles are not working enough to help blow off CO2.  The patient had strong neck flexion and extension which is reassuring.  On exam the patient did not have any decrement of muscle activation on sustained testing and larger muscles or eyelids to make a suspicious of conditions like myasthenia gravis.    MRI brain that was completed did not show any intracranial pathology that can explain the patient's lack of physiological response to hypercarbia.  All of the symptoms could be related to the hemidiaphragm paralysis in addition to the acute illness the patient is undergoing.  We agree with the sleep clinic referral on outpatient basis for further evaluation.  In addition we would recommend continuing AVAPS for preventing  hypercarbia.  It is reassuring that the patient's mental status and neuroexam are nonfocal.  No further recommendations from general neurology inpatient team for further evaluation management of the patient's symptoms.  We will be signing off now.  Kindly call us with any questions/concerns.     Recommendations  -MRI brain with and without contrast with coronal DWI sequence (to look for brainstem lesions) did not show any acute pathology that would explain a central cause for the patient's symptoms.  -Agree with sleep clinic referral on discharge.  -No further recommendations from neurology standpoint for further evaluation management.  We will be signing off now.  Kindly call us with any questions/concerns.      Patient was seen and discussed with KARTHIK Monson MD.    Terry Durant MD   Neurology resident PGY 3.

## 2024-03-03 NOTE — PLAN OF CARE
Shift: [id number]     D: See flowsheets for full assessment & vitals    PRNs: Hominy lidocaine cream prior to dialysis, imodium x1, tylenol x1 for headache which was given in dialysis   Labs: no RN managed labs, daily BG monitoring stable, critical PCO2 of 76, MD aware. Plan to continue to trend BID + reinforce BIPAP overnight and w/ naps, patient educated and verbalized understanding of importance.   Running: R CVC infusing continuous TPN at 30ml/hr. R PIV x2 SL. L AVF. PEG-J clamped.      A/R: no acute events this shift. Pt remains vitally stable on 3L NC. Tolerating a renal diet w/ good appetite. No new skin concerns present, dressing changed to L upper arm skin tear. Oliguria, HD, BM x 2. Patient had a run of HD this morning. Family present at bedside this afternoon. awaiting safe discharge plans, Q1 hour rounding completed, call light w/in reach and able to make needs known, will continue to monitor     P: continue w/ poc     Goal Outcome Evaluation:      Plan of Care Reviewed With: patient    Overall Patient Progress: no changeOverall Patient Progress: no change    Outcome Evaluation: HD run this morning, critical PCO2 76

## 2024-03-03 NOTE — PLAN OF CARE
Pt is alert and oriented x 4, bed alarm on for safety, vitally stable on 35% BIPAP mask and sating 98%, but switched to NC @ 0600. Calm and cooperative with cares. Right CVC infusing TPN and Lipids, PEG tube clamped between medications, L AV fistula for HD. HD schedule is T/TH/Sat. Imodium given x 1 for loose diarrhea. Lidocaine patch on right lateral hip. Denies nausea. Up to commode with assist of 1. Continue with plan of care    Goal Outcome Evaluation:                 Outcome Evaluation: Right CVC infusing TPN and Lipids, PEG tube clamped between medications, L AV fistula for HD. HD schedule is T/TH/Sat. Imodium given x 1 for loose diarrhea. Lidocaine patch on right lateral hip. Denies nausea. Up to commode with assist of 1.

## 2024-03-04 ENCOUNTER — APPOINTMENT (OUTPATIENT)
Dept: OCCUPATIONAL THERAPY | Facility: CLINIC | Age: 64
DRG: 640 | End: 2024-03-04
Attending: INTERNAL MEDICINE
Payer: MEDICARE

## 2024-03-04 ENCOUNTER — APPOINTMENT (OUTPATIENT)
Dept: GENERAL RADIOLOGY | Facility: CLINIC | Age: 64
DRG: 640 | End: 2024-03-04
Payer: MEDICARE

## 2024-03-04 LAB
ALBUMIN SERPL BCG-MCNC: 3.2 G/DL (ref 3.5–5.2)
ALP SERPL-CCNC: 88 U/L (ref 40–150)
ALT SERPL W P-5'-P-CCNC: <5 U/L (ref 0–50)
ANION GAP SERPL CALCULATED.3IONS-SCNC: 7 MMOL/L (ref 7–15)
AST SERPL W P-5'-P-CCNC: 13 U/L (ref 0–45)
BACTERIA BLD CULT: NO GROWTH
BACTERIA BLD CULT: NO GROWTH
BASE EXCESS BLDV CALC-SCNC: 2.5 MMOL/L (ref -3–3)
BASOPHILS # BLD AUTO: ABNORMAL 10*3/UL
BASOPHILS # BLD MANUAL: 0 10E3/UL (ref 0–0.2)
BASOPHILS NFR BLD AUTO: ABNORMAL %
BASOPHILS NFR BLD MANUAL: 0 %
BILIRUB SERPL-MCNC: 0.2 MG/DL
BUN SERPL-MCNC: 52 MG/DL (ref 8–23)
CALCIUM SERPL-MCNC: 8.8 MG/DL (ref 8.8–10.2)
CHLORIDE SERPL-SCNC: 103 MMOL/L (ref 98–107)
CREAT SERPL-MCNC: 3.3 MG/DL (ref 0.51–0.95)
DEPRECATED HCO3 PLAS-SCNC: 30 MMOL/L (ref 22–29)
EGFRCR SERPLBLD CKD-EPI 2021: 15 ML/MIN/1.73M2
EOSINOPHIL # BLD AUTO: ABNORMAL 10*3/UL
EOSINOPHIL # BLD MANUAL: 0.1 10E3/UL (ref 0–0.7)
EOSINOPHIL NFR BLD AUTO: ABNORMAL %
EOSINOPHIL NFR BLD MANUAL: 3 %
ERYTHROCYTE [DISTWIDTH] IN BLOOD BY AUTOMATED COUNT: 22.7 % (ref 10–15)
GLUCOSE SERPL-MCNC: 142 MG/DL (ref 70–99)
HCO3 BLDV-SCNC: 33 MMOL/L (ref 21–28)
HCT VFR BLD AUTO: 29.9 % (ref 35–47)
HGB BLD-MCNC: 8.6 G/DL (ref 11.7–15.7)
IMM GRANULOCYTES # BLD: ABNORMAL 10*3/UL
IMM GRANULOCYTES NFR BLD: ABNORMAL %
INR PPP: 0.99 (ref 0.85–1.15)
LYMPHOCYTES # BLD AUTO: ABNORMAL 10*3/UL
LYMPHOCYTES # BLD MANUAL: 0.7 10E3/UL (ref 0.8–5.3)
LYMPHOCYTES NFR BLD AUTO: ABNORMAL %
LYMPHOCYTES NFR BLD MANUAL: 15 %
MAGNESIUM SERPL-MCNC: 2 MG/DL (ref 1.7–2.3)
MAGNESIUM SERPL-MCNC: 2.6 MG/DL (ref 1.7–2.3)
MCH RBC QN AUTO: 35.4 PG (ref 26.5–33)
MCHC RBC AUTO-ENTMCNC: 28.8 G/DL (ref 31.5–36.5)
MCV RBC AUTO: 123 FL (ref 78–100)
METAMYELOCYTES # BLD MANUAL: 0.2 10E3/UL
METAMYELOCYTES NFR BLD MANUAL: 4 %
MONOCYTES # BLD AUTO: ABNORMAL 10*3/UL
MONOCYTES # BLD MANUAL: 0.4 10E3/UL (ref 0–1.3)
MONOCYTES NFR BLD AUTO: ABNORMAL %
MONOCYTES NFR BLD MANUAL: 9 %
NEUTROPHILS # BLD AUTO: ABNORMAL 10*3/UL
NEUTROPHILS # BLD MANUAL: 3.4 10E3/UL (ref 1.6–8.3)
NEUTROPHILS NFR BLD AUTO: ABNORMAL %
NEUTROPHILS NFR BLD MANUAL: 69 %
NRBC # BLD AUTO: 0 10E3/UL
NRBC BLD AUTO-RTO: 0 /100
O2/TOTAL GAS SETTING VFR VENT: 3 %
OXYHGB MFR BLDV: 88 % (ref 70–75)
PCO2 BLDV: 89 MM HG (ref 40–50)
PH BLDV: 7.17 [PH] (ref 7.32–7.43)
PHOSPHATE SERPL-MCNC: 2.2 MG/DL (ref 2.5–4.5)
PHOSPHATE SERPL-MCNC: 2.8 MG/DL (ref 2.5–4.5)
PLAT MORPH BLD: ABNORMAL
PLATELET # BLD AUTO: 212 10E3/UL (ref 150–450)
PO2 BLDV: 64 MM HG (ref 25–47)
POLYCHROMASIA BLD QL SMEAR: SLIGHT
POTASSIUM SERPL-SCNC: 3.7 MMOL/L (ref 3.4–5.3)
PREALB SERPL-MCNC: 22.3 MG/DL (ref 20–40)
PROT SERPL-MCNC: 5.5 G/DL (ref 6.4–8.3)
RBC # BLD AUTO: 2.43 10E6/UL (ref 3.8–5.2)
RBC MORPH BLD: ABNORMAL
SAO2 % BLDV: 89.9 % (ref 70–75)
SODIUM SERPL-SCNC: 140 MMOL/L (ref 135–145)
THEOPHYLLINE SERPL-MCNC: <0.8 UG/ML (ref 10–20)
TRIGL SERPL-MCNC: 59 MG/DL
WBC # BLD AUTO: 4.9 10E3/UL (ref 4–11)

## 2024-03-04 PROCEDURE — 250N000012 HC RX MED GY IP 250 OP 636 PS 637

## 2024-03-04 PROCEDURE — 250N000012 HC RX MED GY IP 250 OP 636 PS 637: Performed by: PHYSICIAN ASSISTANT

## 2024-03-04 PROCEDURE — 90935 HEMODIALYSIS ONE EVALUATION: CPT

## 2024-03-04 PROCEDURE — 36569 INSJ PICC 5 YR+ W/O IMAGING: CPT

## 2024-03-04 PROCEDURE — 120N000002 HC R&B MED SURG/OB UMMC

## 2024-03-04 PROCEDURE — 272N000451 HC KIT SHRLOCK 5FR POWER PICC DOUBLE LUMEN

## 2024-03-04 PROCEDURE — 80198 ASSAY OF THEOPHYLLINE: CPT

## 2024-03-04 PROCEDURE — 85610 PROTHROMBIN TIME: CPT | Performed by: INTERNAL MEDICINE

## 2024-03-04 PROCEDURE — 99232 SBSQ HOSP IP/OBS MODERATE 35: CPT | Performed by: NURSE PRACTITIONER

## 2024-03-04 PROCEDURE — 250N000013 HC RX MED GY IP 250 OP 250 PS 637

## 2024-03-04 PROCEDURE — 82805 BLOOD GASES W/O2 SATURATION: CPT

## 2024-03-04 PROCEDURE — 258N000003 HC RX IP 258 OP 636: Performed by: INTERNAL MEDICINE

## 2024-03-04 PROCEDURE — 999N000157 HC STATISTIC RCP TIME EA 10 MIN

## 2024-03-04 PROCEDURE — 83735 ASSAY OF MAGNESIUM: CPT

## 2024-03-04 PROCEDURE — 99232 SBSQ HOSP IP/OBS MODERATE 35: CPT | Mod: GV | Performed by: INTERNAL MEDICINE

## 2024-03-04 PROCEDURE — 85014 HEMATOCRIT: CPT

## 2024-03-04 PROCEDURE — 250N000009 HC RX 250: Performed by: STUDENT IN AN ORGANIZED HEALTH CARE EDUCATION/TRAINING PROGRAM

## 2024-03-04 PROCEDURE — 99233 SBSQ HOSP IP/OBS HIGH 50: CPT | Mod: GC | Performed by: INTERNAL MEDICINE

## 2024-03-04 PROCEDURE — 94660 CPAP INITIATION&MGMT: CPT

## 2024-03-04 PROCEDURE — 36415 COLL VENOUS BLD VENIPUNCTURE: CPT

## 2024-03-04 PROCEDURE — 250N000009 HC RX 250

## 2024-03-04 PROCEDURE — 71046 X-RAY EXAM CHEST 2 VIEWS: CPT

## 2024-03-04 PROCEDURE — 84100 ASSAY OF PHOSPHORUS: CPT

## 2024-03-04 PROCEDURE — 71046 X-RAY EXAM CHEST 2 VIEWS: CPT | Mod: 26 | Performed by: RADIOLOGY

## 2024-03-04 PROCEDURE — 36591 DRAW BLOOD OFF VENOUS DEVICE: CPT

## 2024-03-04 PROCEDURE — 250N000011 HC RX IP 250 OP 636

## 2024-03-04 PROCEDURE — 84134 ASSAY OF PREALBUMIN: CPT

## 2024-03-04 PROCEDURE — 250N000013 HC RX MED GY IP 250 OP 250 PS 637: Performed by: INTERNAL MEDICINE

## 2024-03-04 PROCEDURE — 99232 SBSQ HOSP IP/OBS MODERATE 35: CPT | Mod: FS | Performed by: PHYSICIAN ASSISTANT

## 2024-03-04 PROCEDURE — 84478 ASSAY OF TRIGLYCERIDES: CPT | Performed by: INTERNAL MEDICINE

## 2024-03-04 PROCEDURE — 94640 AIRWAY INHALATION TREATMENT: CPT | Mod: 76

## 2024-03-04 PROCEDURE — 80053 COMPREHEN METABOLIC PANEL: CPT | Performed by: INTERNAL MEDICINE

## 2024-03-04 PROCEDURE — 250N000009 HC RX 250: Performed by: INTERNAL MEDICINE

## 2024-03-04 PROCEDURE — 97535 SELF CARE MNGMENT TRAINING: CPT | Mod: GO

## 2024-03-04 PROCEDURE — 85007 BL SMEAR W/DIFF WBC COUNT: CPT

## 2024-03-04 PROCEDURE — 94640 AIRWAY INHALATION TREATMENT: CPT

## 2024-03-04 RX ORDER — HEPARIN SODIUM,PORCINE 10 UNIT/ML
5-20 VIAL (ML) INTRAVENOUS EVERY 24 HOURS
Status: DISCONTINUED | OUTPATIENT
Start: 2024-03-04 | End: 2024-03-04

## 2024-03-04 RX ORDER — HEPARIN SODIUM,PORCINE 10 UNIT/ML
5-20 VIAL (ML) INTRAVENOUS
Status: DISCONTINUED | OUTPATIENT
Start: 2024-03-04 | End: 2024-03-04

## 2024-03-04 RX ORDER — LIDOCAINE 40 MG/G
CREAM TOPICAL
Status: DISCONTINUED | OUTPATIENT
Start: 2024-03-04 | End: 2024-04-15 | Stop reason: HOSPADM

## 2024-03-04 RX ORDER — SODIUM PHOSPHATE, MONOBASIC, MONOHYDRATE 276; 142 MG/ML; MG/ML
35-105 INJECTION, SOLUTION INTRAVENOUS ONCE
Status: COMPLETED | OUTPATIENT
Start: 2024-03-04 | End: 2024-03-04

## 2024-03-04 RX ORDER — HYDROXYZINE HYDROCHLORIDE 25 MG/1
25 TABLET, FILM COATED ORAL
Status: COMPLETED | OUTPATIENT
Start: 2024-03-04 | End: 2024-03-04

## 2024-03-04 RX ORDER — LIDOCAINE 40 MG/G
CREAM TOPICAL
Status: ACTIVE | OUTPATIENT
Start: 2024-03-04 | End: 2024-03-07

## 2024-03-04 RX ADMIN — HYDROXYZINE HYDROCHLORIDE 25 MG: 25 TABLET, FILM COATED ORAL at 20:01

## 2024-03-04 RX ADMIN — Medication 2.5 MCG: at 10:26

## 2024-03-04 RX ADMIN — ACETAMINOPHEN 975 MG: 325 TABLET, FILM COATED ORAL at 22:53

## 2024-03-04 RX ADMIN — Medication 40 MG: at 10:26

## 2024-03-04 RX ADMIN — ACETAMINOPHEN 975 MG: 325 TABLET, FILM COATED ORAL at 15:57

## 2024-03-04 RX ADMIN — LIDOCAINE 4% 1 PATCH: 40 PATCH TOPICAL at 20:02

## 2024-03-04 RX ADMIN — Medication 2.5 MCG: at 20:00

## 2024-03-04 RX ADMIN — LIDOCAINE AND PRILOCAINE: 25; 25 CREAM TOPICAL at 12:57

## 2024-03-04 RX ADMIN — TACROLIMUS 4 MG: 5 CAPSULE ORAL at 10:28

## 2024-03-04 RX ADMIN — SODIUM CHLORIDE 250 ML: 9 INJECTION, SOLUTION INTRAVENOUS at 14:21

## 2024-03-04 RX ADMIN — CALCIUM CARBONATE 600 MG (1,500 MG)-VITAMIN D3 400 UNIT TABLET 1 TABLET: at 18:59

## 2024-03-04 RX ADMIN — SODIUM PHOSPHATE, DIBASIC, ANHYDROUS, POTASSIUM PHOSPHATE, MONOBASIC, AND SODIUM PHOSPHATE, MONOBASIC, MONOHYDRATE 250 MG: 852; 155; 130 TABLET, COATED ORAL at 20:01

## 2024-03-04 RX ADMIN — SMOFLIPID 250 ML: 6; 6; 5; 3 INJECTION, EMULSION INTRAVENOUS at 20:24

## 2024-03-04 RX ADMIN — LEVOTHYROXINE SODIUM 25 MCG: 0.03 TABLET ORAL at 10:27

## 2024-03-04 RX ADMIN — Medication: at 14:22

## 2024-03-04 RX ADMIN — CALCIUM CARBONATE 600 MG (1,500 MG)-VITAMIN D3 400 UNIT TABLET 1 TABLET: at 10:26

## 2024-03-04 RX ADMIN — LIDOCAINE HYDROCHLORIDE ANHYDROUS 2 ML: 10 INJECTION, SOLUTION INFILTRATION at 18:28

## 2024-03-04 RX ADMIN — CYANOCOBALAMIN TAB 500 MCG 500 MCG: 500 TAB at 10:26

## 2024-03-04 RX ADMIN — MAGNESIUM SULFATE HEPTAHYDRATE: 500 INJECTION, SOLUTION INTRAMUSCULAR; INTRAVENOUS at 20:23

## 2024-03-04 RX ADMIN — HEPARIN SODIUM 5000 UNITS: 5000 INJECTION, SOLUTION INTRAVENOUS; SUBCUTANEOUS at 01:29

## 2024-03-04 RX ADMIN — PREDNISONE 5 MG: 5 TABLET ORAL at 10:26

## 2024-03-04 RX ADMIN — LEVALBUTEROL HYDROCHLORIDE 1.25 MG: 1.25 SOLUTION RESPIRATORY (INHALATION) at 21:56

## 2024-03-04 RX ADMIN — SODIUM PHOSPHATE, MONOBASIC, MONOHYDRATE AND SODIUM PHOSPHATE, DIBASIC, ANHYDROUS 35 ML: 142; 276 INJECTION, SOLUTION INTRAVENOUS at 15:53

## 2024-03-04 RX ADMIN — DAPSONE 50 MG: 100 TABLET ORAL at 18:59

## 2024-03-04 RX ADMIN — PREDNISONE 2.5 MG: 2.5 TABLET ORAL at 20:01

## 2024-03-04 RX ADMIN — SODIUM CHLORIDE 300 ML: 9 INJECTION, SOLUTION INTRAVENOUS at 14:21

## 2024-03-04 RX ADMIN — HEPARIN SODIUM 5000 UNITS: 5000 INJECTION, SOLUTION INTRAVENOUS; SUBCUTANEOUS at 12:57

## 2024-03-04 RX ADMIN — Medication 40 MG: at 20:00

## 2024-03-04 RX ADMIN — TACROLIMUS 4 MG: 5 CAPSULE ORAL at 18:59

## 2024-03-04 ASSESSMENT — ACTIVITIES OF DAILY LIVING (ADL)
ADLS_ACUITY_SCORE: 36

## 2024-03-04 NOTE — PROGRESS NOTES
Date: 11/17/2023  Time: 1628     Data:  Pre Wt:   47.8 kg  Desired Wt:   To be established  Post Wt:  45.3 kg (estimated)  Weight change: - 2.5 kg  Ultrafiltration - Post Run Net Total Removed (mL):  2500 ml  Vascular Access Status: Graft patent  Dialyzer Rinse:  Light  Total Blood Volume Processed: 50.0 L   Total Dialysis (Treatment) Time:   3 Hrs  Dialysate Bath: K 3, Ca 2.25  Heparin: Heparin: None     Lab:   No  HbsAg - Nonreactive (2/17/24)  HbsAb - 8.44 Susceptible (2/17/24)      Interventions:  Dialysis done through GARTH AVG using 15 gauge needles  UF set to 2.88 Liters, accommodating priming and rinse back volumes. Pt tolerated UF well.   ,   Tylenol and sodium phosphate administered per MAR  CritLine showed a stable Profile B throughout the run, tolerating UF pull  Decannulation done post HD, hemostasis is achieved in 10 minutes  Pressure dressing is applied, to be removed after 4-6 hours  Report given to Ting CEE sent back to  room in stable condition     Assessment:  A/O x 4, calm and cooperative, denies pain  GARTH arm AVGhas good thrill and bruit                Plan:    Per Renal team        Fausto Pride, RN  Acute Dialysis RN  [facility]

## 2024-03-04 NOTE — PROGRESS NOTES
CLINICAL NUTRITION SERVICES - REASSESSMENT NOTE     Nutrition Prescription    RECOMMENDATIONS FOR MDs/PROVIDERS TO ORDER:  Continue with TPN + PO for comfort     Malnutrition Status:    Severe malnutrition in the context of chronic condition     Recommendations already ordered by Registered Dietitian (RD):  Sent pharmacy change order for the following:  - Access: Central  - Dosing wt: change to dry wt of 46 kg ( EDW: 45.5 kg per nephrology)    - Volume: Start 18 hour cycle TPN today. Max concentrated due to Anuric on HD to reduce risk for volume overload (currently on 720 ml PN volume).     - Dextrose: Reduce to 160 gm per day  - AA: increase to 75 gm /day ( 1.6 gm/kg)  - Lipids: SMOF 250 ml, increase to 5 days per week (M-F)    Provision: 1200 kcal/day ( 26 kcal/kg), 75 gm protein/day ( 1.6 gm/kg), 160 gm carb /day ( GIR: 3.62 mg/kg/min peak infusion) and 29% kcal from daily lipids     Consider to add phos to the PN bag given low phos labs ( discussed with the unit pharmacist)  Monitor weekly TG's and LFT's  Check zinc ( added to today's lab) to assess need for supplementation given patient with significant diarrhea.        Future/Additional Recommendations:  TPN tolerance         EVALUATION OF THE PROGRESS TOWARD GOALS   Diet:   On Dialysis  PO for pleasure only likely due to malabsorption       Nutrition Support:    # EN:   PEGJ placed 7/27/22, Was on trickle feeds to stimulate GI and avoid gut atrophy. Taking some Po so TF stopped.   Currently tube clamped between medication  Per provider: patient with chronic osmotic and infectious diarrhea since transplant with recurrent episodes of C diff.    Wt loss (40# in a year) d/t diarrhea, GI dysmotility, and intolerance of enteral feeds (PEG/J tube in place), most recently on elemental formula with inability to tolerate any tube feeds.     # TPN:   Access: Central line  Dosing wt:47.1 kg     Volume: Max concentrated (Oliguric on HD to reduce risk for volume overload  ), continuous TPN at 30 ml/hr (720 ml),  continuous rate started in ICU on 2/28  Dextrose:195 gm   AA: 70 gm  Lipids: SMOF 4 times per week   Additives: infuvite 10 ml /day, trace elements 1 ml daily  Provision: 1228 Kcals (103% MREE, 26 Kcals/kg), 1.5 g/kg protein, GIR 2.8 mg/kg/minute, and 24% fat kcals on average daily.       Intake:     TPN: meets needs  TF: off since 2/26   PO: diet for pleasure only and to stimulate gut     Visited with patient this morning. Patient notes tolerating small amount of PO. Had bites of white toast with box of cereal for bkf. Had sips of apple juice. Patient notes, continues with diarrhea.       NEW FINDINGS   Chart reviewed: Transferred to [address] [place] [address] of COPD s/p BSLT (6/28/2022) with course complicated by post-operative hemidiaphragm palsy, recurrent PNAs, severe gastroparesis and small bowel dysmotility with s/p G/J tube placement (7/27/22) and pyloric botox (1/25/23), GI bleed 2/2 pyloric ulcer, ESRD on iHD, recurrent falls with injuries (recent right hip fx s/p ORIF 12/2023), chronic diarrhea, recurrent C diff colitis, SIBO s/p rifaximin, FTT.     Admitted 2/10 for concerns over malnutrition initiation of TPN/lipids.  - Course complicated by severe respiratory acidosis and worsening mental status requiring transfer to ICU being [id number] for pneumonia. Has been in and out of the ICU for worsening mental status in the setting of hypercarbia ( 2/17-2/19 and 2/28-2/29).  On AVAPS for hypercarbia. MRI of the brain without any acute intracranial pathology to explain the patient's lack of central cause of physiological response to hypercarbia. Per pulmonology suspect CARLEE  - ESRD: L AV fistula for HD. HD schedule is T/TH/Sat       Meds:   On immune suppressant meds via G-tube       GI/stool:  Diarrheal stool x 5 yesterday and x4 so far today   Hgas been having on average 4-12 stool daily   C-diff test negative on 2/21       Labs noted:  BUN: 52, Cr: 3.30, GFR: 16 -> On  HD  Na+: 140  K+: 3.7, Mg++:2.6 (H), Phos: 2.2 (L) -> currently not on phos binders ( renvela on hold)  Glucose: 142 (H)  Bicarb: 30 (H)    ALP: 88, ALT:<5, AST:13  Total bili: 0.2  TGS: 198 (H) on 2/20/24  Zinc: N/A    Wt trend:  Admit wt on 2/10 -> 43.4 kg (95 lb 9.6 oz)   Standing wt on 2/14 -> 46.2 kg (101 lb 12.8 oz)   Most recent standing wt on 3/3 ->  48.8 kg (107 lb 8 oz)   EDW: 45.5 kg ( current TPN dosing wt is based on 47.1 kg bed scale on 2/26)  + 1-2 edema 2/2 third spacing due to hypoalbuminemia      MALNUTRITION  Intake: Decreased intake does not meet criteria, On TPN  % Weight Loss: Unable to assess ( current wt > EDW)   Subcutaneous Fat Loss: Facial region:  moderate  Muscle Loss: Temporal:Moderate, Facial & jaw region:  moderate, Thoracic region (clavicle):  severe, Upper arm (bicep, tricep):  severe, Lower arm  (forearm):  severe, Upper leg (quadricep, hamstring):  severe, and Posterior calf:  severe  Fluid Accumulation/Edema: Mild  Malnutrition Diagnosis: Severe malnutrition in the context of chronic condition     Previous Goals   Total avg nutritional intake to meet a minimum of 25 kcal/kg and 1.2 g PRO/kg daily (per dosing wt 43.4 kg).     Evaluation: suspect Met with TPN    Previous Nutrition Diagnosis  Inadequate oral intake related to decreased appetite/gastroparesis as evidenced by pt report, TPN to help meet nutritional needs    Evaluation: No longer applicable, nutrition diagnosis changed below    CURRENT NUTRITION DIAGNOSIS  Malnutrition related to altered GI as evidenced by reliant on TPN support to provide for maintenance nutrition needs while PO for comfort       INTERVENTIONS  Implementation  Parenteral Nutrition/IV Fluids - sent pharmacy change order     Goals  Total avg nutritional intake to meet a minimum of 25 kcal/kg and 1.5 g PRO/kg daily (per dosing wt 46 kg EDW ).    Monitoring/Evaluation  Progress toward goals will be monitored and evaluated per protocol.      Tres Vazquez  SARAH/MAGDA Sun

## 2024-03-04 NOTE — PROGRESS NOTES
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 On 2/17/24 she was transferred to ICU for worsening mental status and acute hypoxic and hypercarbic respiratory failure inspite of BiPAP requiring intubation. She was suspected to have PNA and started on antibiotics. Extubated on 2/19 without complications and tolerated iHD on 2/20, and tolerated PO regular diet in addition to TPN. Developed progressively worsening respiratory acidosis and hypercarbia, and was re-admitted to ICU on 2/28/24 for close monitoring of intermittent BiPAP and possible intubation, however she improved on AVAPS setting and well enough to transfer back to medicine floor on 2/29/24. Currently working on balancing volume status with current TPN plan.      Changes today:   - continue Bipap while sleeping (and with naps).  If mental status worsens, will use Bipap during the day too as it may indicate worsening hypercarbia. 3L O2 for meals and breaks from BiPAP  - trending VBGs daily  -CXR today w/ Pleural effusions + increased diffuse interstitial edema.   - Discussed dialysis plan with Renal and  planning for M/T/Th/S (2hr run on M; 3hr on other days)/  -Renal okay with R-midline placement for TPN. Consult placed.     #Severe respiratory acidosis, improved on BiPAP  #Acute hypoxic and hypercarbic respiratory failure  #S/P Lung transplant 2022 c/b right hemidiaphragm palsy  #Recurrent pneumonia, resolved  Patient received a bilateral lung transplant 6/28/22 for COPD. Was admitted 2/10 to address malnutrition and admitted to ICU on 2/17 with hypoxic hypercarbic respiratory failure. Intubated on 2/18 AM  due to worsening oxygen requirements, likely due to pulmonary edema given hx of ESRD requiring HD vs infection given hx of lung transplant, immunosuppressed status, and recurrent pneumonias. Extubated on 2/19 without complications, Micro Follow-up on BAL, viral panel, CMV, fungus, and Blood Cultures show no abnormalities. Does have slowly rising pCO2 on VBG - HFNC did not seem to improve elevated pCO2 and instead seemed to potentially cause worsening, possibly due to depressed respiratory drive from high O2. Patient initially was adamant that she would not tolerate BiPAP, however was agreeable to trial on 2/27 PM. Despite intermittent BiPAP overnight, VBG was worse and patient transferred to ICU on 2/28 AM. RT adjusted BiPAP settings while in ICU to AVAPS with improvement in mental status and VBG, and patient transferred back to medicine floor on 2/29. ICU team additionally added theophylline and thyroid replacement as both can help with diaphragmatic/respiratory muscle weakness in setting of COPD and malnutrition. MRI negative for central problems with respiratory drive.   - transplant pulmonary team following  - neurology consulted to assist with evaluation for possible central etiology of hypercapnic respiratory failure - MRI negative, no central concern  - PRN hypertonic saline inhaler with albuterol nebs  - Continue good pulm toilet  - Transplant pulmonology following, recs appreciated  - PTA  immunosuppressive agent  >Prednisone 5 mg every morning, 2.5 mg every afternoon; tacrolimus 4 mg every morning, 4mg every afternoon  > Monitor tacro levels, goal 7-9  > - overnight oximetry study suggestive of O2 vs CPAP need, as did require up to 2LPM overnight to prevent hypoxia  > Try to minimize O2 to preserve respiratory drive, will give IVIG for DSA+   - avoid HFNC, maintain minimum oxygen to keep SaO2 >85%   - continue BiPAP when sleeping (overnight and during naps)  - MIP/MEP testing tomorrow  - CXR 2-view 3/4 w/ pleural effusions due to vol status  - PTA dapsone MWF for PJP prophylaxis  - completed course of zosyn for empiric HAP course (2/17 - 2-23)  - Initial decompensation likely from opioids - limit medications that would depress respiration   - d/c'd theophylline 3/3/24 due to difficulty attaining therapeutic levels   - continue thyroid function as below  - awaiting metabolic CART study results  - may need BiPAP at home - currently does not have one. urgent sleep clinic referral at discharge        Antibiotics:     Vancomycin (2/17 - 2/17)  Zosyn (2/17 - 2/23)  Dapsone MWF, PJP ppx   Micafungin (2/18- 2/21)     Immunosuppression  Tacrolimus  Prednisone       #Severe malnutrition   #FTT   #Hypoalbuminemia  #Gastroparesis, small bowel dysmotility  #S/P PEG/J with intolerance of enteral nutrition  # Chronic osmotic diarrhea/SIBO s/p Rifaximin > improving     Patient with gastroparesis (presumed due to vagal injury) and small bowel dysmotility complicated by unintentional 40lb weight loss over the past year and now severe malnutrition. Previously intolerant to oral food intake due to nausea. Was initially admitted for portacath and TPN initiation since 2/13. After extubation has been able to tolerate feeds without n/v from 2/20. Electrolytes trending towards baseline today.  Per GI, next steps for workup for malnutrition would include CT enterography to evaluate for anatomic abnormalities contributing to  malnutrition vs possible treatment for SIBO (though patient has had multiple courses of treatment in the past with no long term improvement). Patient couldn't tolerate the oral load for CT enterography on 2/21, so will defer this until she is able to tolerate greater PO load. On 2/23 , again discussed with patient the need of small bowel motility study if not improving outpatient through [facility]. No ongoing concerns for SIBO on 2/23 as patient is passing multiple episodes of stools and gas with no abdominal pain or distention. C-diff test negative on 2/21. Per RD, patient tolerating >300 kcal of intake PO currently, so stopped trickle Tube feeds and continuing with PO and TPN for nutrition.   - Regular diet + increased TPN +  no further tube feeds per RD & transplant pul discussion               - Nephrology: limit fluid < 1.5 L per day for TPN ( chart vol of TPN in the I/O). Renal team okay with midline on RUE                - RD concerned pt is not absorbing any oral intake and they will be monitoring Bowel function, I/O and, PO/TF/TPN intake.               -  stopped TF as PO intake sufficient for preventing gut atrophy  - CT enterography with contrast- Pt not able to tolerate oral contrast load of 1500 ml on 2/21; no ongoing concerns for SIBO, would need small bowel motility study if not improving outpatient through [facility]  - Daily Weights  - monitor CMP in the AM        #Acute on chronic Anemia 2/2 ESRD  Hgb have been stable 7-8s, with no acute signs of bleeding, acute drop 2/29 from 8.2 > 6.6 after two rechecks, no overt signs of bleeding noted, patient reports some abdominal pain but otherwise physical exam unremarkable.  LUE US negative for DVT 2/18.    - continue epogen per nephrology with dialysis  - venofer 50 mcg qweek with dialysis  - type and screen performed 2/29, 1 unit pRBCs ordered and transfused      #ESRD on HD M/T/Th/Sat  #Hypervolemic hyponatremia  #Anion gap metabolic acidosis - resolved  Patient  is ESRD on T/Th/Sat HD as outpt, Hgb stabilizing. HD tolerating well. Continuing monitoring electrolytes daily. Anion gap normal since 2/21. Will continue to monitor daily labs/ABG  for refeeding syndrome and acidosis.   - Continue Venofer injections    - CBC and CMP daily  - Continue epogen dose 8000 units as per nephrology  - Strict I/Os  - HD M/T/TH/Sat per nephrology given hypervolemia; Plan for 2hr run on M; 3hr on T/Th/S.   -Start K phos 250 mg bid 3/4    # Elevated TSH and low T4 and T3  Patient with new low T4 at 0.64 and TSH at 6.5, concerning for new hypothyroidism vs sick thyroid syndrome. TSH with appropriate response, more consistent with elevation in the setting of acute illness. ICU team on 2/28 recommended initiation of treatment for possible hypothyroid as this can improve respiratory muscle function in the setting of weakness and malnutrition.   - continue liothyronine and levothyroxine per ICU team recommendations  - repeat thyroid function studies on 3/7/24; adjust dosing as needed      #Left upper extremity unilateral edema, improving   #Bilateral pedal and ankle edema, improving  Edema due to third spacing due to hypoalbuminemia vs HF. BNP >65234 at admission, Echo on 2/18 shows EF of 55-60% with IVC of <2.1 and collapsing >50% with sniff, normal RA pressure, no significant valvular abnormalities; Left upper extremity swelling and pitting lower extremity edema likely due to hypoalbuminemia improved today. Upper limb duplex USG on 2/18 negative for DVT. Wt went from 43.4 kg on admission to 47.8 kg. She is urinating but cannot chart as she usually urinates with BM. She reports trending to baseline with urination. She denies dysuria, retention symptoms. USG left arm on 2/21 shows steel physiology and Vascular Surgery consulted. Vascular surgery has only minor concerns for steal symptoms and given that the AVF is working well, they are okay with outpatient fistulograms/ venoplasty with wrist  brachial index and PPG's, unless new concerns arise. LUE and LE edema significantly decreased since yesterdays HD. No new tingling/ numbness noticed.  - Continue daily weights  - Strict I/Os  - Elevation and wrapping of only lower legs as needed and increased UF per nephrology for volume management; no wrapping of Left upper extremity with dialysis fistula  - lymphedema consulted for BLE edema  -HD as noted above        #Steal physiology of LUE dialysis access fistula  LUE arterial US obtained 2/21 with concern for LUE edema. US of fistula demonstrated steal physiology. Discussed with nephrology, and vascular surgery consulted on 2/22. Vascular surgery has only minor concerns for steal symptoms and given that the AVF is working well, they are okay with outpatient fistulograms/ venoplasty with wrist brachial index and PPG's, unless new concerns arise.  - plan for outpatient workup as above; nephrology in agreement with outpatient workup.       #Facial and neck bruising  #Pain  Reports soreness in her neck from lying in bed. No soreness in the buttocks/ back. Patient has multiple bruises over her arms and face which is attributed to previous falls. No new bruising reported today. Patients reports her headaches are improving with tylenol. Using heating pads and lidocaine patch for body pains. Couple of small skin tears noted by the Rn's. Skin care done accordingly.  - Tylenol Q4  - Lidocaine patch prn  - Heating pads prn  - Diligent skin cares       #HTN  #A-fib, resolved  Noted atrial fibrillation on arrival with HR elevated at 150, not sustained for > 10 minutes and otherwise hemodynamically stable. Rates stable in the 80's. BP normalizing since 2/22.  - PTA metoprolol 25mg BID - holding for now with lower BP with increased UF per nephrology, resume if becoming more hypertensive  - Continuous telemetry       # Encephalopathy secondary to hypercarbia - resolved  Patient was progressively more lethargic on 2/17 during  admission in [facility]. Etiology likely secondary to hypercarbia in context of respiratory failure as patient was noted to have high CO2s concomitant with lethargy. Though receiving oxycodone and fentanyl, lethargy was only partially alleviated by narcan. LFTs and ammonia wnl with low suspicion of hepatic encephalopathy. BUN wnl with low suspicion for uremic encephalopathy. Head CT on 2/18 was negative with low suspicion of acute intracranial pathology. Patient is awake, alert, oriented and following commands since 2/20.        # Right hip fracture s/p ORIF (December 2023)   - PT/OT       # GERD  - PTA PPI       # Hx EBV viremia   # Hypogammaglobulinemia  # Chronic immunosuppression 2/2 lung transplant  Patient has been afebrile and has not had leukocytosis however she is under immunosuppression. Given acute respiratory failure and hx of recurrent pneumonias, she was started on empiric abx for concerns over new pneumonia. RVP and cultures no growth to date. Last day of empirical treatment for HAP.  - EBV 27K (decreased compared to prior)         Lines/tubes/drains:  - CVC RIJ (for TPN, is tunneled line)   - PIV x2  - PEG/J  - HD AV fistula, left arm         Diet:   Regular diet PO  Majority of nutrition through TPN per RD     General Cares/Prophylaxis:    DVT Prophylaxis: Heparin subcutaneous - resuming with stable Hgb  GI Prophylaxis: PPI  Fluids: As per TPN  Code Status: Full        Diet: Renal Diet (dialysis)  parenteral nutrition - ADULT compounded formula  parenteral nutrition - ADULT compounded formula CYCLE    DVT Prophylaxis: resume subQ heparin  Dong Catheter: Not present  Fluids: Per TPN and PO  Lines: PRESENT      CVC Single Lumen Right Internal jugular Non - valved (open ended);Tunneled;Power injectable-Site Assessment: WDL      Cardiac Monitoring: None  Code Status: Full Code      Clinically Significant Risk Factors              # Hypoalbuminemia: Lowest albumin = 2.6 g/dL at 2/18/2024  5:13  AM, will monitor as appropriate             # Severe Malnutrition: based on nutrition assessment      # Financial/Environmental Concerns: none         Disposition Plan         The patient's care was discussed with the Attending physician Dr. Delacruz, Bedside Nurse, Patient, and Transplant Pulm Consultant(s).    Betty Diaz, PGY4  Internal Medicine-Pediatrics  Pager: [phone]  [facility]  Securely message with [employer] (more info)  Text page via [employer] Paging/Directory   See signed in provider for up to date coverage information  ______________________________________________________________________    Interval History   No acute events overnight. VBG abnormal at 7.17/89/64/33 with BiPAP for only about 4hrs. Mentation fine. Noting some increased swelling in ankles. No cpap at home, but does have home O2.       Physical Exam   Vital Signs: Temp: 98  F (36.7  C) Temp src: Axillary BP: (!) 140/71 Pulse: 93   Resp: 20 SpO2: 100 % O2 Device: Nasal cannula Oxygen Delivery: 3 LPM  Weight: 105 lbs 6.08 oz    General: thin, laying in bed, able to fully participate in conversation  HEENT: Normocephalic, bruising on the right face around right orbit with hematoma and right neck, improved  Neuro: very awake and alert, fully oriented, following commands, answering questions clearly and easily, moving all extremities  Pulm/Resp: breathing on NC, crackles in bases bilaterally, no increased work of breathing  CV: RRR, warm extremities, 1+ pitting edema in left hand, no edema in right arm or hand, 1-2+ pitting edema in distal bilateral lower extremities to ankle b/l  Abdomen: Non-distended, PEG in place, non-tender  Incisions/Skin: Bruising to face and right forearm.     Medical Decision Making       Please see A&P for additional details of medical decision making.      Data     I have personally reviewed the following data over the past 24 hrs:    4.9  \    8.6 (L)   / 212     140 103 52.0 (H) /  142 (H)   3.7 30 (H) 3.30 (H) \     ALT: <5 AST: 13 AP: 88 TBILI: 0.2   ALB: 3.2 (L) TOT PROTEIN: 5.5 (L) LIPASE: N/A     INR:  0.99 PTT:  N/A   D-dimer:  N/A Fibrinogen:  N/A       Imaging results reviewed over the past 24 hrs:   Recent Results (from the past 24 hour(s))   XR Chest 2 Views    Narrative    Exam: XR CHEST 2 VIEWS, 3/4/2024 8:38 AM    Indication: re-evaluation with persistent CO2 elevation for pulmonary  edema vs infection vs other etiology of respiratory acidosis    Comparison: 2/28/2024 chest x-ray 2/28/2024. CT 2/26/2024    Findings:   Central thoracotomy and bilateral lung transplants. The right IJ  catheter tip is near the superior cavoatrial junction. Increased hilar  fullness, indistinct pulmonary vasculature and septal thickening.  Unchanged left greater than right pleural effusions. Again seen  abnormal positioning of the left major fissure and lobes of the left  allograft.      Impression    Impression:   Pleural effusions with increased diffuse interstitial edema.  Underlying bilateral lung transplantation.    I have personally reviewed the examination and initial interpretation  and I agree with the findings.    ALVINA HARRY MD         SYSTEM ID:  [id number]

## 2024-03-04 NOTE — PLAN OF CARE
Shift: [id number]     D: See flowsheets for full assessment & vitals    PRNs: Imodium x1  Labs: no RN managed labs, daily BG monitoring stable, PCO2 of 72, Plt 212  Running: R CVC infusing continuous TPN at 30ml/hr. R PIV x2 SL. L AVF. PEG-J clamped.      A/R: no acute events this shift. Pt remains vitally stable on 2L NC. Tolerating a renal diet w/ fair appetite. No new skin concerns present, dressing changed to L upper arm skin tear and sacral mepi. Oliguria, HD. awaiting safe discharge plans, Q1 hour rounding completed, call light w/in reach and able to make needs known, will continue to monitor     P: continue w/ poc     Goal Outcome Evaluation:      Plan of Care Reviewed With: patient    Overall Patient Progress: no changeOverall Patient Progress: no change

## 2024-03-04 NOTE — PROGRESS NOTES
Pulmonary Medicine  Cystic Fibrosis - Lung Transplant Team  Progress Note  2024     Patient: Sofie Rodriguez  MRN: [id number]  : 1960 (age 63 year old)  Transplant: 2022 (Lung), POD#615  Admission date: 2/10/2024    Assessment & Plan:     Sofie Rodriguez is a 63 year old female with h/o COPD s/p BSLT () with course complicated by post-operative hemidiaphragm palsy, recurrent PNAs, positive DSA, EBV viremia, hypogammaglobulinemia, severe gastroparesis s/p G/J tube placement (22) and pyloric botox (23), GI bleed 2/2 pyloric ulcer, hemobilia s/p ERCP and MRCP, chronic diarrhea, recurrent C diff colitis, ESRD on iHD, recurrent falls with injuries (recent right hip fx s/p ORIF 2023), deconditioning, and FTT.  Admitted 2/10 for initiation of TPN/lipids.  Transferred to ICU  for emergent intubation for hypoxic and hypercapneic respiratory failure with severe respiratory acidosis and encephalopathy.  iHD increased, infectious workup unremarkable.  Extubated .  Continues with persistent and progressive hypercapnia with severe acidosis, returned to ICU - d/t tenuous respiratory status.  Improvement noted with increase in tolerance of NIPPV, now off during the day with stable blood gases.  Discharge pending nutrition plan (likely ongoing TPN/lipids) and stability with regular iHD schedule given additional volume with parenteral nutrition.     Today's recommendations:  - Follow pending blood cultures (), sputum cultures ordered if able to collect  - AVAPS with  ml overnight and with daytime naps (strict adherence by pt. and nursing), NC during the day, daily VBG  - Discuss documentation of MIP/MEP with RT tomorrow  - Repeat 2-view CXR tomorrow  - Tacrolimus via G tube, repeat level ordered 3/5  - Repeat DSA ordered 3/6  - Repeat CMV, Prospera, and EBV ordered 3/18  - Planning to transition today to 4x/week schedule (MTTSa)     Acute on chronic hypoxic/hypercapneic  respiratory failure:  S/p bilateral sequential lung transplant for COPD:  Right hemidiaphragm palsy:   Hypoventilation, Suspected CARLEE: CT PTA (2/7) with decreased MARLON opacities but new tree in bud RLL opacities.  PFTs unchanged in clinic (but ATS criteria not met), remain significantly below her baseline.  Baseline hypoxia with 2L NC overnight.  Respiratory decompensation with encephalopathy and severe respiratory acidosis on 2/17.  S/p intubation 2/17-2/19.  Repeat CT (2/17) with new patchy consolidative and nodular opacities, GGO primarily in the bases and intralobular septal thickening (concerning for pulmonary edema given recent addition of TPN nutrition, new infection, PTLD, and/or septic emboli.  Bronch with lavage of RML (2/18) with very friable tissue, cutlures only with C. glabrata.  S/p empiric Zosyn (2/17-2/24) and micafungin (2/18-2/21).  Severe respiratory acidosis with hypercapnia persisting with slight improvement with NIPPV treatment, limited by pt. tolerance to pressure and mask (claustrophobia).  Persistent respiratory failure and variable encephalopathy also complicated by diaphragmatic weakness, hypoventilation, and deconditioning d/t malnutrition.  Repeat CT (2/26) with diffuse GG and consolidative opacities >BLL and diffuse interlobular septal thickening.  Intermittent tolerance of NIPPV persists, reports some improvement in comfort with transition from BiPAP to AVAPS.  Neurology consulted 2/27.  Procal increased to 1.56 (2/28).  Transferred to ICU 2/28 given tenuous respiratory status and potential need for reintubation.  Improvement noted with continuous NIPPV with minimal interruptions (sodium bicarb also stopped, likely partially contributing), trial off NIPPV during the day started 2/29.  MRI brain (3/1) with moderate leukoaraiosis, no acute intracranial pathology.  - Blood cultures (2/28) NGTD  - Sputum cultures ordered if able to collect  - Xopenex BID (2/27)  - Defer ABX at this time  -  AVAPS with  ml overnight and with daytime naps (strict adherence by pt. and nursing), NC during the day, daily VBG  - Discuss documentation of MIP/MEP with RT tomorrow  - Repeat 2-view CXR tomorrow (iHD run today)  - Sleep clinic eval indicated as OP     Immunosuppression:  ImmuKnow low at 108 on 1/10.  AZA previously stopped 5/2023 d/t low ImmuKnow assay and EBV viremia.  Repeat ImmuKnow (2/27) low at 88, defer dose adjustment (tacro goal already decreased one day prior).  - Tacrolimus 4 mg BID via G tube (decreased 3/2).  Goal level 6-8 (decreased 2/26 d/t ImmuKnow and concern for infection).  Repeat level ordered 3/5.  - Prednisone 5 mg qAM / 2.5 mg qPM     Prophylaxis:   - Dapsone 50 mg q MWF for PJP ppx  - CMV ppx not currently indicated, D+/R+, CMV negative 2/19 (BAL very mildly positive 2/18, likely not clinically significant), repeat CMV ordered 3/18     Positive DSA: PFTs and pulmonary symptoms have remained stable as OP, so [facility] treatment deferred given frailty.  Most recent DSA (2/7) with DQB2 mfi 6966 (from 5723 one month prior).  Most recent cell-free DNA Prospera (2/18) mildly elevated at 1.04 (concerning for possible rejection), which was increased from prior level of 0.12 (1/10).  IST increase deferred at that time per Dr. Gong.  S/p IVIG (2/27) for DSA (IgG WNL).  - Repeat DSA ordered 3/6  - Repeat Prospera ordered 3/18      EBV viremia: CT CAP (2/7) without lymphadenopathy.  Most recent level (2/18) improved to 28k from 96k (2/7)  - Repeat EBV ordered 3/18     Other relevant problems being managed by the primary team:      FTT:  Severe protein calorie malnutrition:  Gastroparesis s/p PEG/J, botox, and G-POEM:  SB hypomotility:  Pyloric ulcer:  Chronic nausea and osmotic diarrhea:  SIBO s/p rifaximin:   Recurrent C diff colitis: Chronic osmotic and infectious diarrhea since transplant with recurrent episodes of C diff.  Notable weight loss (40# in a year) d/t diarrhea, GI dysmotility, and  intolerance of enteral feeds (PEG/J tube in place), most recently on elemental formula.  Extensive OP eval and f/u with GI.  S/p port placement for TPN and lipids.  Tolerating modest PO intake (likely absorbing minimal per GI), monitoring for refeeding syndrome.    - PO diet for pleasure only  - TPN/lipids per primary, anticipate continued need after discharge given GI concerns for negligible enteral nutritional absorption potential  - CT enterography recommended per GI, but unlikely to be able to tolerate the required 1.5 L enteral contrast bolus so deferring for now     ESRD: CT scan (with declining respiratory status) with volume overload secondary to TPN volume, iHD increased from PTA TThSa schedule with unsustained improvement.  Management per nephrology, dialysis via [facility].  Planning to transition today to 4x/week schedule ([facility]).     We appreciate the excellent care provided by the [place] [id number] team.  Recommendations communicated via this note.  Will continue to follow along closely, please do not hesitate to call with any questions or concerns.    Patient discussed with Dr. Huff.    Catherine Johnson, DNP, APRN, CNP  Inpatient Nurse Practitioner  Pulmonary CF/Transplant     Subjective & Interval History:     Increased intolerance of AVAPS overnight, pt. feels like she maybe wore it for about 3 hours with frequent interruptions, reports mask comfort as main issue (but previously not noted with same mask).  VBG this morning worse as a result (despite being collected one hour on BiPAP).  Taken off AVAPS in the morning and no supplemental oxygen applied.  Pt. ate breakfast and then fell asleeep, noted to be 78% on RA.  AVAPS urgently reapplied and reinforced need to use with daytime sleeping and otherwise 2.5-3L NC.  No new cough or sputum.  Last HD 3/2, oliguric per pt.    Review of Systems:     C: No fever, no chills, no change in weight, no change in appetite  INTEGUMENTARY/SKIN: + Ecchymosis from  falls  ENT/MOUTH: No nasal congestion or drainage  RESP: See interval history  CV: No chest pain, no palpitations, + peripheral edema, no orthopnea  GI: + Occ nausea, no vomiting, stable loose stools, no reflux symptoms  : See interval history  MUSCULOSKELETAL: + Shoulder/hip pain  ENDOCRINE: Blood sugars with adequate control  NEURO: No headache, + tingling to hands/feet (at least several weeks but newly reported by pt.)  PSYCHIATRIC: Mood stable    Physical Exam:     All notes, images, and labs from past 24 hours (at minimum) were reviewed.    Vital signs:  Temp: 98.9  F (37.2  C) Temp src: Oral BP: 124/76 Pulse: 100   Resp: 20 SpO2: 100 % O2 Device: Nasal cannula Oxygen Delivery: 3 LPM   Weight: 47.8 kg (105 lb 6.1 oz)  I/O:   Intake/Output Summary (Last 24 hours) at 3/4/2024 1645  Last data filed at 3/4/2024 1628  Gross per 24 hour   Intake 120 ml   Output 2500 ml   Net -2380 ml     Constitutional: Lying in bed sleeping, in no apparent distress.   HEENT: Eyes with pink conjunctivae, anicteric.  Oral mucosa moist without lesions.   PULM: Mildly diminished air flow bilaterally.  No crackles, no rhonchi, no wheezes.  Non-labored breathing on RA (but 78% SpO2).  CV: Normal S1 and S2.  RRR.  No murmur, gallop, or rub.  2-3+ BLE edema.   ABD: NABS, soft, nontender, nondistended.  PEG/J tube site not visualized.  MSK: Moves all extremities.  ++ muscle wasting.   NEURO: Asleep but easily arousable, conversant.   SKIN: Warm, dry, fragile, diffuse ecchymosis to extremities and right neck/shoulder.   PSYCH: Mood stable.     Data:     LABS    CMP:   Recent Labs   Lab 03/04/24  0713 03/03/24  1609 03/03/24  0551 03/02/24  1623 03/02/24  1138 03/02/24  0623 03/01/24  0354 03/01/24  0351     --  138  --   --  138  --  136   POTASSIUM 3.7  --  3.5  --   --  3.5  --  3.1*   CHLORIDE 103  --  102  --   --  101  --  99   CO2 30*  --  30*  --   --  27  --  29   ANIONGAP 7  --  6*  --   --  10  --  8   *  --  129*  --  " 160* 126*   < > 144*   BUN 52.0*  --  30.4*  --   --  46.9*  --  28.0*   CR 3.30*  --  2.13*  --   --  3.01*  --  1.86*   GFRESTIMATED 15*  --  26*  --   --  17*  --  30*   ESTUARDO 8.8  --  8.3*  --   --  8.9  --  8.3*   MAG 2.6* 2.3 2.1 1.8  --  2.2   < > 1.8   PHOS 2.2* 2.5 2.0* 2.1*  --  3.3   < > 2.1*   PROTTOTAL 5.5*  --  5.1*  --   --  5.3*  --  5.3*   ALBUMIN 3.2*  --  2.9*  --   --  3.1*  --  3.1*   BILITOTAL 0.2  --  0.2  --   --  0.2  --  0.3   ALKPHOS 88  --  79  --   --  70  --  63   AST 13  --  14  --   --  16  --  13   ALT <5  --  <5  --   --  <5  --  7    < > = values in this interval not displayed.     CBC:   Recent Labs   Lab 03/04/24  0713 03/03/24  0551 03/02/24  0623 03/01/24  0351   WBC 4.9 3.9* 4.9 5.4   RBC 2.43* 2.50* 2.49* 2.32*   HGB 8.6* 9.1* 8.6* 8.3*   HCT 29.9* 30.3* 29.1* 26.9*   * 121* 117* 116*   MCH 35.4* 36.4* 34.5* 35.8*   MCHC 28.8* 30.0* 29.6* 30.9*   RDW 22.7* 22.7* 22.8* 23.7*    212 236 240       INR:   Recent Labs   Lab 03/04/24  0713 02/29/24  0332   INR 0.99 1.09       Glucose:   Recent Labs   Lab 03/04/24  0713 03/03/24  0551 03/02/24  1138 03/02/24  0623 03/01/24  2205 03/01/24  0354   * 129* 160* 126* 120* 145*       Blood Gas:   Recent Labs   Lab 03/04/24  0713 03/03/24  0550 03/02/24  1623   PHV 7.17* 7.27* 7.24*   PCO2V 89* 72* 76*   PO2V 64* 53* 92*   HCO3V 33* 33* 32*   LINDY 2.5 5.4* 3.2*   O2PER 3 35 35       Culture Data No results for input(s): \"CULT\" in the last 168 hours.    Virology Data:   Lab Results   Component Value Date    FLUAH1 Not Detected 02/18/2024    FLUAH3 Not Detected 02/18/2024    CO9325 Not Detected 02/18/2024    IFLUB Not Detected 02/18/2024    RSVA Not Detected 02/18/2024    RSVB Not Detected 02/18/2024    PIV1 Not Detected 02/18/2024    PIV2 Not Detected 02/18/2024    PIV3 Not Detected 02/18/2024    HMPV Not Detected 02/18/2024       Historical CMV results (last 3 of prior testing):  Lab Results   Component Value Date    " CMVQNT Not Detected 02/19/2024    CMVQNT Not Detected 02/07/2024    CMVQNT Not Detected 01/10/2024     Lab Results   Component Value Date    CMVLOG 3.2 07/12/2023    CMVLOG <2.1 04/19/2023    CMVLOG 3.5 01/25/2023       Urine Studies    Recent Labs   Lab Test 02/18/24  0222 05/18/23  0627   URINEPH 7.5* 5.0   NITRITE Negative Negative   LEUKEST Trace* Moderate*   WBCU 66* 21*       Most Recent Breeze Pulmonary Function Testing (FVC/FEV1 only)  FVC-Pre   Date Value Ref Range Status   02/07/2024 1.19 L    01/10/2024 1.12 L    08/29/2023 1.48 L    07/25/2023 1.55 L      FVC-%Pred-Pre   Date Value Ref Range Status   02/07/2024 42 %    01/10/2024 39 %    08/29/2023 53 %    07/25/2023 55 %      FEV1-Pre   Date Value Ref Range Status   02/07/2024 1.13 L    01/10/2024 1.10 L    08/29/2023 1.43 L    07/25/2023 1.54 L      FEV1-%Pred-Pre   Date Value Ref Range Status   02/07/2024 51 %    01/10/2024 49 %    08/29/2023 64 %    07/25/2023 69 %        IMAGING    Recent Results (from the past 48 hour(s))   XR Chest 2 Views    Narrative    Exam: XR CHEST 2 VIEWS, 3/4/2024 8:38 AM    Indication: re-evaluation with persistent CO2 elevation for pulmonary  edema vs infection vs other etiology of respiratory acidosis    Comparison: 2/28/2024 chest x-ray 2/28/2024. CT 2/26/2024    Findings:   Central thoracotomy and bilateral lung transplants. The right IJ  catheter tip is near the superior cavoatrial junction. Increased hilar  fullness, indistinct pulmonary vasculature and septal thickening.  Unchanged left greater than right pleural effusions. Again seen  abnormal positioning of the left major fissure and lobes of the left  allograft.      Impression    Impression:   Pleural effusions with increased diffuse interstitial edema.  Underlying bilateral lung transplantation.    I have personally reviewed the examination and initial interpretation  and I agree with the findings.    ALVINA HARRY MD         SYSTEM ID:  [id number]

## 2024-03-04 NOTE — PLAN OF CARE
Pt is alert and oriented x 4. On BIPAP Mask over noc while sleeping. However, pt had BIPAP on from 2300 to 2345, then was off BIPAP while sitting up from 2345 to 0130. Then was on BIPAP from 0130 to 0300. Then took it off @ 0300 and RN put BIPAP back on @ 0400, then left a note for lab to draw pt after 7am so pt can be on BIPAP a little longer. Pt agreed that she will not take BIPAP off again until afte 0700. Right CVC infusing TPN (No Lipids on ольга night), PEG tube clamped between medications, L AV fistula for HD. HD schedule is T/TH/Sat. Diarrheal stool x 3 this shift. Lidocaine patch on right lateral hip. Denies nausea. Up to commode with assist of 1.     Goal Outcome Evaluation:      Plan of Care Reviewed With: patient    Overall Patient Progress: no changeOverall Patient Progress: no change    Outcome Evaluation: On BIPAP Mask over noc while sleeping. However, pt had BIPAP on from 2300 to 2345, then was off BIPAP while sitting up from 2345 to 0130. Then was on BIPAP from 0130 to 0300. Then took it off @ 0300 and RN put BIPAP back on @ 0400, then left a note for lab to draw pt after 7am so pt can be on BIPAP a little longer. Pt agreed that she will not take BIPAP off again until afte 0700. Right CVC infusing TPN (No Lipids on ольга night), PEG tube clamped between medications, L AV fistula for HD. HD schedule is T/TH/Sat. Diarrheal stool x 3 this shift. Lidocaine patch on right lateral hip. Denies nausea. Up to commode with assist of 1.

## 2024-03-04 NOTE — PROGRESS NOTES
Nephrology Progress Note  03/04/2024         Assessment & Recommendations:   Sofie Rodriguez is a 63 year old year old female with ESKD, COPD s/p b/l lung transplant in 6/2022 with multiple complications, gastroparesis s/p GJ tube, GIB, chronic diarrhea, recurrent c-diff, FTT with inability to tolerate any tube feeds, R hip fx s/p ORIF 12/2023, admitted on 2/10 for initiation of TPN/lipids, transferred to ICU on 2/17 with worsening mental status and respiratory acidosis in spite of bipap, ultimately intubated on 2/18, being treated for PNA, also with volume overload in setting of high volume TPN. Persistent respiratory acidosis in spite of volume off, transferred to ICU for hypotension and respiratory failure, improved on bipap, now floor status again 3/1    # ESKD - TTS, LUE AVG, 3hr, 45.5kg (will be lowered), [facility], Dr. Andres Fairbanks. She has been losing weight and now even below her recent set EDW.  - Pt is now agreeable to 4x/week dialysis (3 hrs TTS and 2 hrs Mondays)  - Requires EMLA cream an hour before HD  - please avoid PICC which will compromise future dialysis accesses; a midline is much preferred for this patient       # Dialysis access: LUE AVG placed 3-4 months ago, per pt. She had arm swelling and a fistulogram a couple months ago and swelling improved but now has redeveloped.  - US with c/f possible steal syndrome  - per vascular surgery, no concern for steal syndrome, ok to go ahead with fistulogram  - given that AVF is working well on dialysis (450 BFR) and at this time IR is only able to perform fistulograms when AVF isn't working well, will defer to OP for management.    # Persistent respiratory acidosis: MRI without c/f central cause; possible weakness of resp muscles. Adding phos to dialysate today  - plan is for bipap at night and during day if napping (though pt doesn't always do this)    # Volume/BP: EDW 45.5 kg. Edema due to third spacing due to hypoalbuminemia. Anuric; on  "metoprolol soln 25 mg bid  - volume overload on CXR and on exam with 1-2+ pitting edema   - pre HD wt 47.8 kg today, goal 2 to 2.5 kg  - appreciate condensing TPN/lipids as much as possible (~1L per day)       # Nutrition: on TPN and regular diet  - per team, concern is that pt isn't absorbing much PO or tube feeds with diarrhea increasing significantly with increase in tube feeds; thus needing TPN     # Anemia 2/2 ESKD  On Venofer 50 qwk, Mircera last dose 1/9/2024  - hgb 8's  - Will continue Venofer 50 mcg q week (Tuesday)  - increase epogen dose from 4000 to 8000 units (starting 2/20), TTS    # BMD : Ca 8-9's, phos 2.2, alb 3.2  - renvela is held, will restart if phos remains elevated  - will add phos to dialysate today  - starting K phos 250 mg bid 3/4           Recommendations were communicated to primary team via note and phone    RABIA Moctezuma   Division of Renal Disease and Hypertension  [phone]    Interval History :   Seen bedside, feeling well, though pH low with pt off bipap intermittently overnight. Pt is now agreeable to 4x/week HD for volume management. Adding phos to bath today.    Review of Systems:   4 point ROS neg other than as noted above    Physical Exam:   I/O last 3 completed shifts:  In: 480 [P.O.:240; NG/GT:240]  Out: 5 [Urine:5]   BP (!) 140/71 (BP Location: Right arm, Cuff Size: Adult Small)   Pulse 93   Temp 98  F (36.7  C) (Axillary)   Resp 20   Ht 1.57 m (5' 1.81\")   Wt 47.8 kg (105 lb 6.1 oz)   SpO2 100%   BMI 19.39 kg/m       GENERAL APPEARANCE: NAD  PULM: diminished  CV: regular rhythm, normal rate, no rub     -1-2+ pitting edema lower extremities, (LUE (fistula arm) > RUE)  GI: soft   INTEGUMENT: no cyanosis, no rash  NEURO: a/o3  Access Left AVG     Labs:   All labs reviewed by me  Electrolytes/Renal -   Recent Labs   Lab Test 03/04/24  0713 03/03/24  1609 03/03/24  0551 03/02/24  1623 03/02/24  1138 03/02/24  0623     --  138  --   --  138   POTASSIUM 3.7  " --  3.5  --   --  3.5   CHLORIDE 103  --  102  --   --  101   CO2 30*  --  30*  --   --  27   BUN 52.0*  --  30.4*  --   --  46.9*   CR 3.30*  --  2.13*  --   --  3.01*   *  --  129*  --  160* 126*   ESTUARDO 8.8  --  8.3*  --   --  8.9   MAG 2.6* 2.3 2.1   < >  --  2.2   PHOS 2.2* 2.5 2.0*   < >  --  3.3    < > = values in this interval not displayed.       CBC -   Recent Labs   Lab Test 03/04/24  0713 03/03/24  0551 03/02/24  0623   WBC 4.9 3.9* 4.9   HGB 8.6* 9.1* 8.6*    212 236       LFTs -   Recent Labs   Lab Test 03/04/24  0713 03/03/24  0551 03/02/24  0623   ALKPHOS 88 79 70   BILITOTAL 0.2 0.2 0.2   ALT <5 <5 <5   AST 13 14 16   PROTTOTAL 5.5* 5.1* 5.3*   ALBUMIN 3.2* 2.9* 3.1*       Iron Panel -   Recent Labs   Lab Test 09/26/22  0555 09/03/22  1039 08/24/22  0810   IRON 54 21* 41   IRONSAT 22 9* 21   CARLOS 769* 343* 334*         Imaging:  All imaging studies reviewed by me.     Current Medications:   calcium carbonate-vitamin D  1 tablet Per J Tube TID w/meals    cyanocobalamin  500 mcg Per Feeding Tube Daily    dapsone  50 mg Per J Tube Q Mon Wed Fri AM    heparin ANTICOAGULANT  5,000 Units Subcutaneous Q12H    levalbuterol  1.25 mg Nebulization 2 times daily    levothyroxine  25 mcg Oral QAM AC    lidocaine  1 patch Transdermal Q24h    liothyronine  2.5 mcg Oral BID    lipids 4 oil  250 mL Intravenous Once per day on Monday Tuesday Wednesday Thursday Friday    [Held by provider] metoprolol  25 mg Per J Tube BID    pantoprazole  40 mg Per J Tube BID    predniSONE  5 mg Per J Tube QAM    And    predniSONE  2.5 mg Per J Tube QPM    [Held by provider] sevelamer carbonate (RENVELA)  0.8 g Oral BID    tacrolimus  4 mg Per G Tube QAM    And    tacrolimus  4 mg Per G Tube QPM      dextrose      heparin (porcine) 500 Units/hr (02/24/24 0839)    - MEDICATION INSTRUCTIONS -      parenteral nutrition - ADULT compounded formula CYCLE      parenteral nutrition - ADULT compounded formula 30 mL/hr at 03/03/24  2211    - MEDICATION INSTRUCTIONS -      sodium chloride 0.9%       RABIA Moctezuma

## 2024-03-04 NOTE — PROGRESS NOTES
"Palliative Care Consultation Note  [facility]      Patient: Sofie Rodriguez  Date of Admission:  2/10/2024    Requesting Clinician / Team: Medicine  Reason for consult: Goals of care  Decisional support       Recommendations & Counseling     GOALS OF CARE:   Restorative without limits. Sofie noted that she would want to do whatever she needs at this time to try and improve her clinical status.    She is okay with her prolonged hospitilization at this time. She also noted that she would want to remain full code. We discussed how anxious she was when using the BiPAP previously and she agreed that yes it makes her quite anxious but she would want to use it again if it could save her life. She did note that earlier in her hospital stay that she said she was unsure if she could continue to go through aggressive restorative cares. We revisited this and she said that she feels that she can and would want to continue with full cares.  We discussed code status and she said that she would want to have CPR and intubation performed. She did say that it would be important for her to \"have her mind\" and be able to interact with friends and family in a meaningful way for her.   We will continue to discuss this with Sofie as I worry that CPR would very likely not lead to an outcome that would be acceptable to her  Discussed briefly today before Sofie had to go for HD on 3/4 - Sofie noted that she has an old HCD from [facility] but it does not appear in care everywhere, plan to follow up with her tomorrow and discuss starting writing a new HCD as she does not remember if her old one is reflective of cares she would or would not want at this time.    ADVANCE CARE PLANNING:  No HCD on file, Sofie noted that her daughter, Charity, should be her surrogate decision maker if she were unable to make decisions for herself   There is no POLST form on file,  recommend continued medical treatment and " FULL CODE   Code status: Full Code    MEDICAL MANAGEMENT:   We are not actively managing symptoms at this time.    PSYCHOSOCIAL/SPIRITUAL SUPPORT:  Sofie finds fidel mostly in her friends and family. She lives in [place] in a house with one of her daughters and three of her grandchildren. She has two other adult children as well and four other grandchildren as well.  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 Thank you for the consult and allowing us to aid in the care of Sofie Rodriguez.    These recommendations have been discussed with the primary team .    Asher Vargas MD  Securely message with [employer] (more info)  Text page via [email or web address] Paging/Andely     Thank you for the opportunity to continue to participate in the care of this patient and family.  Please feel free to contact on-call palliative provider with any emergent needs.  We can be reached via Securely message with the Vocera Web Console (learn more here) or Text page via [employer]   Physician Attestation:   I, Sophia Gillette MD, saw this patient and agree with Dr. Vargas's findings and plan of care as documented in the note. Total time spent was 40 minutes spent regarding plan of care, support on the date of the encounter.   Sophia Gillette MD / Palliative Medicine   Medical Decision Making                 Assessment      Sofie Rodriguez is a 63 year old female with PMH COPD s/p bilateral lung transplant 6/28/22, hemidiaphragm palsy, PEG dependence, ESRD on HD, and SIBO    Initially admitted on 2/10/24 with worsening malnutrition requiring TPN initiation. Her hospital course has been complicated by worsening CO2 retention, pulmonary edema, and AMS requiring BiPAP and intubation.    She is now extubated, improving and transferred out of the ICU.  Dialysis today    Today, the patient was seen for:  Status post bilateral lung transplant with hemidiaphragm palsy  PEG  dependent  ESRD  [facility]   Support      Palliative Care Summary:   Met with Sofie briefly today as she had to go to dialysis.     She noted that she believes she has an [facility]. She does not recall what was written in it or if it is reflective of cares she would or would not want regarding her current illness.     I left the [place] HCD long form in her room and we will discuss this further with her tomorrow.                 Medications:  I have reviewed this patient's medication profile and medications from this hospitalization.        Physical Exam   Vital Signs with Ranges  Temp:  [98  F (36.7  C)] 98  F (36.7  C)  Pulse:  [] 93  Resp:  [20] 20  BP: (139-140)/(66-71) 140/71  FiO2 (%):  [35 %] 35 %  SpO2:  [93 %-100 %] 100 %  105 lbs 6.08 oz    Very thin, middle aged woman laying in bed. Appears comfortable, Scattered ecchymosis of varying ages across her face, neck and upper extremities    Data reviewed:  Recent Labs   Lab 03/04/24  0713 03/03/24  0551 03/02/24  1138 03/02/24  0623 02/29/24  0556 02/29/24  0332   WBC 4.9 3.9*  --  4.9   < >  --    HGB 8.6* 9.1*  --  8.6*   < >  --    * 121*  --  117*   < >  --     212  --  236   < >  --    INR 0.99  --   --   --   --  1.09    138  --  138   < > 139   POTASSIUM 3.7 3.5  --  3.5   < > 3.6   CHLORIDE 103 102  --  101   < > 98   CO2 30* 30*  --  27   < > 30*   BUN 52.0* 30.4*  --  46.9*   < > 70.2*   CR 3.30* 2.13*  --  3.01*   < > 3.47*   ANIONGAP 7 6*  --  10   < > 11   ESTUARDO 8.8 8.3*  --  8.9   < > 8.9   * 129* 160* 126*   < > 140*   ALBUMIN 3.2* 2.9*  --  3.1*   < > 3.0*   PROTTOTAL 5.5* 5.1*  --  5.3*   < > 5.2*   BILITOTAL 0.2 0.2  --  0.2   < > 0.2   ALKPHOS 88 79  --  70   < > 64   ALT <5 <5  --  <5   < > 8   AST 13 14  --  16   < > 13    < > = values in this interval not displayed.

## 2024-03-04 NOTE — PROGRESS NOTES
Can't run TPN through midline, patient has right internal jugular CVC, so midline order will be canceled, RN aware.

## 2024-03-04 NOTE — PLAN OF CARE
"IP Lymphedema Discharge Summary    Reason for therapy discharge:    No further expectations of functional progress.    Progress towards therapy goal(s). See goals on Care Plan in [facility] electronic health record for goal details.  Goals partially met.  Barriers to achieving goals:   limited tolerance for therapy.    Therapy recommendation(s):    Edema: Greeted with compression removed, pt reporting still the pain from compression is more irritating than the swelling, and only able to tolerate compression for 1-2hrs at a time. Pt agreeable to wear as tolerated and have RN assist with don/doffing. Pt declining skin cares. Donned Size E Comperm from MTPs to ~3\" above ankles, folded double over feet to boost compression. OT emphasized day on/night doffed wear schedule, to remove compression if low tolerance. Agreeable to edema sign-off, updated whiteboard. Care plan order reflects above details.       "

## 2024-03-05 ENCOUNTER — APPOINTMENT (OUTPATIENT)
Dept: GENERAL RADIOLOGY | Facility: CLINIC | Age: 64
DRG: 640 | End: 2024-03-05
Attending: NURSE PRACTITIONER
Payer: MEDICARE

## 2024-03-05 LAB
ALBUMIN SERPL BCG-MCNC: 3.2 G/DL (ref 3.5–5.2)
ALP SERPL-CCNC: 78 U/L (ref 40–150)
ALT SERPL W P-5'-P-CCNC: 5 U/L (ref 0–50)
ANION GAP SERPL CALCULATED.3IONS-SCNC: 10 MMOL/L (ref 7–15)
AST SERPL W P-5'-P-CCNC: 14 U/L (ref 0–45)
BASE EXCESS BLDV CALC-SCNC: 2.3 MMOL/L (ref -3–3)
BASOPHILS # BLD AUTO: ABNORMAL 10*3/UL
BASOPHILS # BLD MANUAL: 0 10E3/UL (ref 0–0.2)
BASOPHILS NFR BLD AUTO: ABNORMAL %
BASOPHILS NFR BLD MANUAL: 0 %
BILIRUB SERPL-MCNC: 0.2 MG/DL
BLAIMP ISLT/SPM QL: NOT DETECTED
BLAKPC ISLT/SPM QL: NOT DETECTED
BLAOXA-48 ISLT/SPM QL: NOT DETECTED
BLAVIM ISLT/SPM QL: NOT DETECTED
BUN SERPL-MCNC: 44.8 MG/DL (ref 8–23)
CALCIUM SERPL-MCNC: 8.7 MG/DL (ref 8.8–10.2)
CHLORIDE SERPL-SCNC: 102 MMOL/L (ref 98–107)
CREAT SERPL-MCNC: 2.61 MG/DL (ref 0.51–0.95)
DEPRECATED HCO3 PLAS-SCNC: 27 MMOL/L (ref 22–29)
EGFRCR SERPLBLD CKD-EPI 2021: 20 ML/MIN/1.73M2
EOSINOPHIL # BLD AUTO: ABNORMAL 10*3/UL
EOSINOPHIL # BLD MANUAL: 0.1 10E3/UL (ref 0–0.7)
EOSINOPHIL NFR BLD AUTO: ABNORMAL %
EOSINOPHIL NFR BLD MANUAL: 4 %
ERYTHROCYTE [DISTWIDTH] IN BLOOD BY AUTOMATED COUNT: 22.5 % (ref 10–15)
GLUCOSE SERPL-MCNC: 135 MG/DL (ref 70–99)
HCO3 BLDV-SCNC: 32 MMOL/L (ref 21–28)
HCT VFR BLD AUTO: 29.8 % (ref 35–47)
HGB BLD-MCNC: 8.7 G/DL (ref 11.7–15.7)
IMM GRANULOCYTES # BLD: ABNORMAL 10*3/UL
IMM GRANULOCYTES NFR BLD: ABNORMAL %
LYMPHOCYTES # BLD AUTO: ABNORMAL 10*3/UL
LYMPHOCYTES # BLD MANUAL: 0.3 10E3/UL (ref 0.8–5.3)
LYMPHOCYTES NFR BLD AUTO: ABNORMAL %
LYMPHOCYTES NFR BLD MANUAL: 8 %
MAGNESIUM SERPL-MCNC: 1.6 MG/DL (ref 1.7–2.3)
MAGNESIUM SERPL-MCNC: 2.7 MG/DL (ref 1.7–2.3)
MCH RBC QN AUTO: 35.7 PG (ref 26.5–33)
MCHC RBC AUTO-ENTMCNC: 29.2 G/DL (ref 31.5–36.5)
MCV RBC AUTO: 122 FL (ref 78–100)
METAMYELOCYTES # BLD MANUAL: 0.1 10E3/UL
METAMYELOCYTES NFR BLD MANUAL: 3 %
MONOCYTES # BLD AUTO: ABNORMAL 10*3/UL
MONOCYTES # BLD MANUAL: 0.4 10E3/UL (ref 0–1.3)
MONOCYTES NFR BLD AUTO: ABNORMAL %
MONOCYTES NFR BLD MANUAL: 11 %
NDM TARGET DNA: NOT DETECTED
NEUTROPHILS # BLD AUTO: ABNORMAL 10*3/UL
NEUTROPHILS # BLD MANUAL: 2.7 10E3/UL (ref 1.6–8.3)
NEUTROPHILS NFR BLD AUTO: ABNORMAL %
NEUTROPHILS NFR BLD MANUAL: 74 %
NRBC # BLD AUTO: 0 10E3/UL
NRBC BLD AUTO-RTO: 0 /100
O2/TOTAL GAS SETTING VFR VENT: 0 %
OXYHGB MFR BLDV: 84 % (ref 70–75)
PCO2 BLDV: 80 MM HG (ref 40–50)
PH BLDV: 7.2 [PH] (ref 7.32–7.43)
PHOSPHATE SERPL-MCNC: 3.4 MG/DL (ref 2.5–4.5)
PHOSPHATE SERPL-MCNC: 3.9 MG/DL (ref 2.5–4.5)
PLAT MORPH BLD: ABNORMAL
PLATELET # BLD AUTO: 214 10E3/UL (ref 150–450)
PO2 BLDV: 56 MM HG (ref 25–47)
POLYCHROMASIA BLD QL SMEAR: SLIGHT
POTASSIUM SERPL-SCNC: 3.3 MMOL/L (ref 3.4–5.3)
PROT SERPL-MCNC: 5.5 G/DL (ref 6.4–8.3)
RBC # BLD AUTO: 2.44 10E6/UL (ref 3.8–5.2)
RBC MORPH BLD: ABNORMAL
SAO2 % BLDV: 87.4 % (ref 70–75)
SODIUM SERPL-SCNC: 139 MMOL/L (ref 135–145)
TACROLIMUS BLD-MCNC: 9 UG/L (ref 5–15)
TME LAST DOSE: NORMAL H
TME LAST DOSE: NORMAL H
WBC # BLD AUTO: 3.6 10E3/UL (ref 4–11)

## 2024-03-05 PROCEDURE — 99232 SBSQ HOSP IP/OBS MODERATE 35: CPT | Mod: GC | Performed by: STUDENT IN AN ORGANIZED HEALTH CARE EDUCATION/TRAINING PROGRAM

## 2024-03-05 PROCEDURE — 250N000013 HC RX MED GY IP 250 OP 250 PS 637

## 2024-03-05 PROCEDURE — 99232 SBSQ HOSP IP/OBS MODERATE 35: CPT | Mod: GV | Performed by: INTERNAL MEDICINE

## 2024-03-05 PROCEDURE — 999N000157 HC STATISTIC RCP TIME EA 10 MIN

## 2024-03-05 PROCEDURE — 85007 BL SMEAR W/DIFF WBC COUNT: CPT

## 2024-03-05 PROCEDURE — 94640 AIRWAY INHALATION TREATMENT: CPT | Mod: 76

## 2024-03-05 PROCEDURE — 250N000012 HC RX MED GY IP 250 OP 636 PS 637: Performed by: STUDENT IN AN ORGANIZED HEALTH CARE EDUCATION/TRAINING PROGRAM

## 2024-03-05 PROCEDURE — 84100 ASSAY OF PHOSPHORUS: CPT

## 2024-03-05 PROCEDURE — 85027 COMPLETE CBC AUTOMATED: CPT

## 2024-03-05 PROCEDURE — 250N000011 HC RX IP 250 OP 636

## 2024-03-05 PROCEDURE — 87798 DETECT AGENT NOS DNA AMP: CPT | Performed by: INTERNAL MEDICINE

## 2024-03-05 PROCEDURE — 84630 ASSAY OF ZINC: CPT | Performed by: INTERNAL MEDICINE

## 2024-03-05 PROCEDURE — 90935 HEMODIALYSIS ONE EVALUATION: CPT

## 2024-03-05 PROCEDURE — 258N000003 HC RX IP 258 OP 636: Performed by: INTERNAL MEDICINE

## 2024-03-05 PROCEDURE — 71046 X-RAY EXAM CHEST 2 VIEWS: CPT

## 2024-03-05 PROCEDURE — 99233 SBSQ HOSP IP/OBS HIGH 50: CPT | Performed by: STUDENT IN AN ORGANIZED HEALTH CARE EDUCATION/TRAINING PROGRAM

## 2024-03-05 PROCEDURE — 94150 VITAL CAPACITY TEST: CPT

## 2024-03-05 PROCEDURE — 250N000009 HC RX 250

## 2024-03-05 PROCEDURE — 94660 CPAP INITIATION&MGMT: CPT

## 2024-03-05 PROCEDURE — 250N000013 HC RX MED GY IP 250 OP 250 PS 637: Performed by: INTERNAL MEDICINE

## 2024-03-05 PROCEDURE — 80053 COMPREHEN METABOLIC PANEL: CPT

## 2024-03-05 PROCEDURE — 83735 ASSAY OF MAGNESIUM: CPT

## 2024-03-05 PROCEDURE — 71046 X-RAY EXAM CHEST 2 VIEWS: CPT | Mod: 26 | Performed by: RADIOLOGY

## 2024-03-05 PROCEDURE — 80197 ASSAY OF TACROLIMUS: CPT | Performed by: PHYSICIAN ASSISTANT

## 2024-03-05 PROCEDURE — 99232 SBSQ HOSP IP/OBS MODERATE 35: CPT | Mod: FS | Performed by: PHYSICIAN ASSISTANT

## 2024-03-05 PROCEDURE — 250N000012 HC RX MED GY IP 250 OP 636 PS 637: Performed by: PHYSICIAN ASSISTANT

## 2024-03-05 PROCEDURE — 120N000002 HC R&B MED SURG/OB UMMC

## 2024-03-05 PROCEDURE — 250N000009 HC RX 250: Performed by: INTERNAL MEDICINE

## 2024-03-05 PROCEDURE — 94640 AIRWAY INHALATION TREATMENT: CPT

## 2024-03-05 PROCEDURE — 250N000012 HC RX MED GY IP 250 OP 636 PS 637

## 2024-03-05 PROCEDURE — 634N000001 HC RX 634: Mod: JZ | Performed by: INTERNAL MEDICINE

## 2024-03-05 PROCEDURE — 36592 COLLECT BLOOD FROM PICC: CPT | Performed by: PHYSICIAN ASSISTANT

## 2024-03-05 PROCEDURE — 82805 BLOOD GASES W/O2 SATURATION: CPT

## 2024-03-05 PROCEDURE — 36592 COLLECT BLOOD FROM PICC: CPT

## 2024-03-05 RX ORDER — SODIUM PHOSPHATE, MONOBASIC, MONOHYDRATE 276; 142 MG/ML; MG/ML
35-105 INJECTION, SOLUTION INTRAVENOUS ONCE
Status: COMPLETED | OUTPATIENT
Start: 2024-03-05 | End: 2024-03-05

## 2024-03-05 RX ORDER — MAGNESIUM SULFATE 1 G/100ML
1 INJECTION INTRAVENOUS ONCE
Status: COMPLETED | OUTPATIENT
Start: 2024-03-05 | End: 2024-03-05

## 2024-03-05 RX ADMIN — Medication 40 MG: at 20:32

## 2024-03-05 RX ADMIN — PREDNISONE 2.5 MG: 2.5 TABLET ORAL at 20:32

## 2024-03-05 RX ADMIN — Medication 2.5 MCG: at 20:32

## 2024-03-05 RX ADMIN — EPOETIN ALFA-EPBX 8000 UNITS: 10000 INJECTION, SOLUTION INTRAVENOUS; SUBCUTANEOUS at 09:24

## 2024-03-05 RX ADMIN — HEPARIN SODIUM 5000 UNITS: 5000 INJECTION, SOLUTION INTRAVENOUS; SUBCUTANEOUS at 18:55

## 2024-03-05 RX ADMIN — Medication 40 MG: at 12:22

## 2024-03-05 RX ADMIN — Medication: at 08:14

## 2024-03-05 RX ADMIN — LIDOCAINE 4% 1 PATCH: 40 PATCH TOPICAL at 20:31

## 2024-03-05 RX ADMIN — HEPARIN SODIUM 5000 UNITS: 5000 INJECTION, SOLUTION INTRAVENOUS; SUBCUTANEOUS at 06:20

## 2024-03-05 RX ADMIN — CYANOCOBALAMIN TAB 500 MCG 500 MCG: 500 TAB at 12:22

## 2024-03-05 RX ADMIN — ACETAMINOPHEN 975 MG: 325 TABLET, FILM COATED ORAL at 09:48

## 2024-03-05 RX ADMIN — LEVALBUTEROL HYDROCHLORIDE 1.25 MG: 1.25 SOLUTION RESPIRATORY (INHALATION) at 20:22

## 2024-03-05 RX ADMIN — LEVOTHYROXINE SODIUM 25 MCG: 0.03 TABLET ORAL at 06:20

## 2024-03-05 RX ADMIN — SODIUM PHOSPHATE, MONOBASIC, MONOHYDRATE AND SODIUM PHOSPHATE, DIBASIC, ANHYDROUS 70 ML: 142; 276 INJECTION, SOLUTION INTRAVENOUS at 08:21

## 2024-03-05 RX ADMIN — PREDNISONE 5 MG: 5 TABLET ORAL at 12:22

## 2024-03-05 RX ADMIN — LIDOCAINE AND PRILOCAINE: 25; 25 CREAM TOPICAL at 07:20

## 2024-03-05 RX ADMIN — TACROLIMUS 4 MG: 5 CAPSULE ORAL at 07:20

## 2024-03-05 RX ADMIN — Medication 2.5 MCG: at 12:22

## 2024-03-05 RX ADMIN — SODIUM PHOSPHATE, DIBASIC, ANHYDROUS, POTASSIUM PHOSPHATE, MONOBASIC, AND SODIUM PHOSPHATE, MONOBASIC, MONOHYDRATE 250 MG: 852; 155; 130 TABLET, COATED ORAL at 12:22

## 2024-03-05 RX ADMIN — SODIUM CHLORIDE 250 ML: 9 INJECTION, SOLUTION INTRAVENOUS at 08:14

## 2024-03-05 RX ADMIN — SODIUM PHOSPHATE, MONOBASIC, MONOHYDRATE: 276; 142 INJECTION, SOLUTION INTRAVENOUS at 20:44

## 2024-03-05 RX ADMIN — SODIUM PHOSPHATE, DIBASIC, ANHYDROUS, POTASSIUM PHOSPHATE, MONOBASIC, AND SODIUM PHOSPHATE, MONOBASIC, MONOHYDRATE 250 MG: 852; 155; 130 TABLET, COATED ORAL at 20:32

## 2024-03-05 RX ADMIN — SMOFLIPID 250 ML: 6; 6; 5; 3 INJECTION, EMULSION INTRAVENOUS at 20:44

## 2024-03-05 RX ADMIN — MAGNESIUM SULFATE HEPTAHYDRATE 1 G: 1 INJECTION, SOLUTION INTRAVENOUS at 22:53

## 2024-03-05 RX ADMIN — CALCIUM CARBONATE 600 MG (1,500 MG)-VITAMIN D3 400 UNIT TABLET 1 TABLET: at 12:22

## 2024-03-05 RX ADMIN — TACROLIMUS 3.5 MG: 5 CAPSULE ORAL at 18:55

## 2024-03-05 RX ADMIN — SODIUM CHLORIDE 300 ML: 9 INJECTION, SOLUTION INTRAVENOUS at 08:14

## 2024-03-05 RX ADMIN — ACETAMINOPHEN 975 MG: 325 TABLET, FILM COATED ORAL at 20:37

## 2024-03-05 RX ADMIN — LOPERAMIDE HYDROCHLORIDE 2 MG: 2 CAPSULE ORAL at 20:34

## 2024-03-05 RX ADMIN — CALCIUM CARBONATE 600 MG (1,500 MG)-VITAMIN D3 400 UNIT TABLET 1 TABLET: at 18:55

## 2024-03-05 ASSESSMENT — ACTIVITIES OF DAILY LIVING (ADL)
ADLS_ACUITY_SCORE: 36

## 2024-03-05 NOTE — CONSULTS
SPIRITUAL HEALTH SERVICES Consult Note  [facility]    Referral Source/Reason for Visit: Routine consult    Summary and Recommendations -  ~Sofie benefits from being able to process through her emotions and her illness. Please consult [facility] as needs arise.    Plan:  services remain available upon request. I will follow up with Sofie as her stay extends.    Rev. JATIN Amador.  Chaplain Resident  Pager [phone]    * [facility] remains available 24/7 for emergent requests/referrals, either by having the switchboard page the on-call  or by entering an ASAP/STAT consult in Epic (this will also page the on-call ). Routine Epic consults receive an initial response within 24 hours.*    Assessment      Patient/Family Understanding of Illness and Goals of Care -   ~Sofie is hoping to get out of the hospital before this weekend to attend a family winter bbq.       Distress and Loss -  ~She feels distress over her illness which limits her ability and increases isolation by not being able to participate in family gatherings.      Strengths, Coping, and Resources -   ~Sofie's main form of coping is through verbal processing with friends and family. Feeling heard and being supported brings her a sense of strength and peace.    ~Gathering with family is deeply enriching and meaningful for Sofie.      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 Not active in [employer]. She does find prayer meaningful.

## 2024-03-05 NOTE — PLAN OF CARE
Goal Outcome Evaluation:      Plan of Care Reviewed With: patient    Overall Patient Progress: no changeOverall Patient Progress: no change    Outcome Evaluation: BiPAP @ HS and with naps, TPN    Assumed cares [id number]. A&O and able to make needs known. HTN; other VSS on 0-2 LPM via nc. Pt went down for XR this morning and was sent with O2 on with nasal cannula. Upon returning, pt was not wearing O2. BiPAP worn for about 1h this morning for a nap. Pt went for a HD run this afternoon with 2.5 L off.  Pt then went for PICC placement after HD. Tylenol was given in HD for h/a and ineffective. Upon returning from PICC placement, Still having h/a and now pain at insertion site. Paged MD for additional pain medication. Continuous TPN infusing through internal jugular @ 30 ml/h. Fair appetite at meals. Meds given through GJ tube. Up with 1 assist to bedside commode. Continue with plan of care.

## 2024-03-05 NOTE — PROGRESS NOTES
Palliative Care Consultation Note  [facility]      Patient: Sofie Rodriguez  Date of Admission:  2/10/2024    Requesting Clinician / Team: Medicine  Reason for consult: Goals of care  Decisional support       Recommendations & Counseling     GOALS OF CARE:   Restorative without limits. Sofie noted that she would want to do whatever she needs at this time to try and improve her clinical status.  Met with Sofie about her HCD, she noted that she has one that she filled out that she believes is in the [facility] system however we were unable to find this. We talked about making a new one while in the hospital as she does not remember what her old one says which she is agreeable to   Social work consult placed for HCD    ADVANCE CARE PLANNING:  No HCD on file, Sofie noted that her daughter, Charity, should be her surrogate decision maker if she were unable to make decisions for herself   There is no POLST form on file,  recommend continued medical treatment and FULL CODE   Code status: Full Code    MEDICAL MANAGEMENT:   We are not actively managing symptoms at this time.    PSYCHOSOCIAL/SPIRITUAL SUPPORT:  Sofie finds fidel mostly in her friends and family. She lives in [place] in a house with one of her daughters and three of her grandchildren. She has two other adult children as well and four other grandchildren as well.  [place] but not involved with the [facility]    Palliative Care will continue to follow. Thank you for the consult and allowing us to aid in the care of Sofie Rodriguez.    These recommendations have been discussed with the primary team .    Asher Vargas MD  Securely message with Imp[employer] (more info)  Text page via [employer] Paging/Directory     Thank you for the opportunity to continue to participate in the care of this patient and family.  Please feel free to contact on-call palliative provider with any emergent needs.  We can be reached via Securely message  with the Remotemedical Web Console (learn more here) or Text page via [employer] Paging/Directory  Thank you for the opportunity to continue to participate in the care of this patient and family.  Please feel free to contact on-call palliative provider with any emergent needs.  We can be reached via Securely message with the Remot[employer] Web Console (learn more here) or Text page via [employer] Paging/WatchDoxy   Physician Attestation:   I, Sophia Gillette MD, saw this patient and agree with Dr. Vargas's findings and plan of care as documented in the note. Total time spent was 40 minutes spent regarding plan of care, goals of care on the date of the encounter.   Sophia Gillette MD / Palliative Medicine   Medical Decision Making                 Assessment      Sofie Rodriguez is a 63 year old female with PMH COPD s/p bilateral lung transplant 6/28/22, hemidiaphragm palsy, PEG dependence, ESRD on HD, and SIBO    Initially admitted on 2/10/24 with worsening malnutrition requiring TPN initiation. Her hospital course has been complicated by worsening CO2 retention, pulmonary edema, and AMS requiring BiPAP and intubation.    She is now extubated, improving and transferred out of the ICU.    Today, the patient was seen for:  Status post bilateral lung transplant with hemidiaphragm palsy  PEG dependent  ESRD  [facility]   Support      Palliative Care Summary:   Met with Sofie today in Dialysis.     She was feeling well and had no complaints. As above we discussed the status of her HCD. She is open to doing a new one during this admission.                Medications:  I have reviewed this patient's medication profile and medications from this hospitalization.        Physical Exam   Vital Signs with Ranges  Temp:  [97.7  F (36.5  C)-98.9  F (37.2  C)] 97.7  F (36.5  C)  Pulse:  [] 94  Resp:  [18-21] 21  BP: (120-164)/(54-83) 164/65  SpO2:  [93 %-100 %] 100 %  105 lbs 6.08 oz    Very thin, middle aged woman laying in bed. Appears  comfortable, Scattered ecchymosis of varying ages across her face, neck and upper extremities    Data reviewed:  Recent Labs   Lab 03/05/24  0607 03/04/24  0713 03/03/24  0551 02/29/24  0556 02/29/24  0332   WBC 3.6* 4.9 3.9*   < >  --    HGB 8.7* 8.6* 9.1*   < >  --    * 123* 121*   < >  --     212 212   < >  --    INR  --  0.99  --   --  1.09    140 138   < > 139   POTASSIUM 3.3* 3.7 3.5   < > 3.6   CHLORIDE 102 103 102   < > 98   CO2 27 30* 30*   < > 30*   BUN 44.8* 52.0* 30.4*   < > 70.2*   CR 2.61* 3.30* 2.13*   < > 3.47*   ANIONGAP 10 7 6*   < > 11   ESTUARDO 8.7* 8.8 8.3*   < > 8.9   * 142* 129*   < > 140*   ALBUMIN 3.2* 3.2* 2.9*   < > 3.0*   PROTTOTAL 5.5* 5.5* 5.1*   < > 5.2*   BILITOTAL 0.2 0.2 0.2   < > 0.2   ALKPHOS 78 88 79   < > 64   ALT 5 <5 <5   < > 8   AST 14 13 14   < > 13    < > = values in this interval not displayed.

## 2024-03-05 NOTE — PROGRESS NOTES
HEMODIALYSIS TREATMENT NOTE    Date: 3/5/2024  Time: 11:08 AM    Data:  Pre Wt: 47.8 kg (105 lb 6.1 oz)   Desired Wt: 44.8  kg   Post Wt: 44.8 kg (98 lb 12.3 oz)  Weight change: 3 kg  Ultrafiltration - Post Run Net Total Removed (mL): 3000 mL  Vascular Access Status: AVG  patent  Dialyzer Rinse: Streaked, Light  Total Blood Volume Processed: 68.11L   Total Dialysis (Treatment) Time: 3.0   Dialysate Bath: K 4, Ca 3  Heparin: None    Lab:   No    Interventions:  Pt dialyzed for 3 hrs via LUE AVG. Pt tolerated 3 L fluid removal. Sodium phosphate added to dialysate prior to tx initiated. Pt was given Tyelnol for headache with relief. Epoetin administered during tx. 400 BFR achieved with good pressure. Hemostasis achieved within 10 min post tx. Handoff report given to RIYA Maguire.       Assessment:  A&Ox4  Calm and cooperative  On 1L O2 via NC  VSS  AVG +T/B     Plan:    Next HD per renal

## 2024-03-05 NOTE — PROGRESS NOTES
Nephrology Progress Note  03/05/2024         Assessment & Recommendations:   Sofie Rodriguez is a 63 year old year old female with ESKD, COPD s/p b/l lung transplant in 6/2022 with multiple complications, gastroparesis s/p GJ tube, GIB, chronic diarrhea, recurrent c-diff, FTT with inability to tolerate any tube feeds, R hip fx s/p ORIF 12/2023, admitted on 2/10 for initiation of TPN/lipids, transferred to ICU on 2/17 with worsening mental status and respiratory acidosis in spite of bipap, ultimately intubated on 2/18, being treated for PNA, also with volume overload in setting of high volume TPN. Persistent respiratory acidosis in spite of volume off, transferred to ICU for hypotension and respiratory failure, improved on bipap, now floor status again 3/1    # ESKD - TTS, LUE AVG, 3hr, 45.5kg (will be lowered), [facility], Dr. Andres Fairbanks. She has been losing weight and now even below her recent set EDW.  - Pt is now agreeable to 4x/week dialysis (3 hrs TTS and 2 hrs Monday's)  - appreciate care coordination to assure OP HD can accommodate 4x/week HD  - Requires EMLA cream an hour before HD  - please avoid PICC which will compromise future dialysis accesses; a midline is much preferred for this patient       # Dialysis access: LUE AVG placed 3-4 months ago, per pt. She had arm swelling and a fistulogram a couple months ago and swelling improved but now has redeveloped.  - US with c/f possible steal syndrome  - per vascular surgery, no concern for steal syndrome, ok to go ahead with fistulogram  - given that AVF is working well on dialysis (450 BFR) and at this time IR is only able to perform fistulograms when AVF isn't working well, will defer to OP for management.    # Persistent respiratory acidosis: MRI without c/f central cause; possible weakness of resp muscles. Adding phos to dialysate today  - plan is for bipap at night and during day if napping (though pt doesn't always do this)    # Volume/BP:  "EDW 45.5 kg. Edema due to third spacing due to hypoalbuminemia. Anuric; on metoprolol soln 25 mg bid  - volume overload on CXR and on exam with 1-2+ pitting edema   - No wt today, 3 kg UF  - appreciate condensing TPN (currently 400-600 ml per day)       # Nutrition: on TPN and regular diet  - per team, concern is that pt isn't absorbing much PO or tube feeds with diarrhea increasing significantly with increase in tube feeds; thus needing TPN     # Anemia 2/2 ESKD  On Venofer 50 qwk, Mircera last dose 1/9/2024  - hgb 8's  - Will continue Venofer 50 mcg q week (Tuesday)  - increase epogen dose from 4000 to 8000 units (starting 2/20), TTS    # BMD: Ca 8-9's, phos 3.4, alb 3.2  - renvela is held, will restart if phos remains elevated  - will add phos to dialysate today  - starting K phos 250 mg bid 3/4  - more phos is also being added to TPN           Recommendations were communicated to primary team via note and phone    RABIA Moctezuma   Division of Renal Disease and Hypertension  [phone]    Interval History :   Seen on dialysis, stable run for 3 kg. Added phos to dialysate again today. No n/v, CP, SOB, chills    Review of Systems:   4 point ROS neg other than as noted above    Physical Exam:   I/O last 3 completed shifts:  In: 40 [I.V.:40]  Out: 2500 [Other:2500]   BP (!) 160/74   Pulse 91   Temp 97.7  F (36.5  C) (Axillary)   Resp 20   Ht 1.57 m (5' 1.81\")   Wt 47.8 kg (105 lb 6.1 oz)   SpO2 100%   BMI 19.39 kg/m       GENERAL APPEARANCE: NAD  PULM: diminished  CV: regular rhythm, normal rate, no rub     -1-2+ pitting pedal and ankles, (LUE (fistula arm) > RUE)  GI: soft   INTEGUMENT: no cyanosis, no rash  NEURO: a/o3  Access Left AVG     Labs:   All labs reviewed by me  Electrolytes/Renal -   Recent Labs   Lab Test 03/05/24  0607 03/04/24  1733 03/04/24  0713 03/03/24  1609 03/03/24  0551     --  140  --  138   POTASSIUM 3.3*  --  3.7  --  3.5   CHLORIDE 102  --  103  --  102   CO2 27  --  30*  " --  30*   BUN 44.8*  --  52.0*  --  30.4*   CR 2.61*  --  3.30*  --  2.13*   *  --  142*  --  129*   ESTUARDO 8.7*  --  8.8  --  8.3*   MAG 2.7* 2.0 2.6*   < > 2.1   PHOS 3.4 2.8 2.2*   < > 2.0*    < > = values in this interval not displayed.       CBC -   Recent Labs   Lab Test 03/05/24  0607 03/04/24  0713 03/03/24  0551   WBC 3.6* 4.9 3.9*   HGB 8.7* 8.6* 9.1*    212 212       LFTs -   Recent Labs   Lab Test 03/05/24  0607 03/04/24  0713 03/03/24  0551   ALKPHOS 78 88 79   BILITOTAL 0.2 0.2 0.2   ALT 5 <5 <5   AST 14 13 14   PROTTOTAL 5.5* 5.5* 5.1*   ALBUMIN 3.2* 3.2* 2.9*       Iron Panel -   Recent Labs   Lab Test 09/26/22  0555 09/03/22  1039 08/24/22  0810   IRON 54 21* 41   IRONSAT 22 9* 21   CARLOS 769* 343* 334*         Imaging:  All imaging studies reviewed by me.     Current Medications:   calcium carbonate-vitamin D  1 tablet Per J Tube TID w/meals    cyanocobalamin  500 mcg Per Feeding Tube Daily    dapsone  50 mg Per J Tube Q Mon Wed Fri AM    heparin ANTICOAGULANT  5,000 Units Subcutaneous Q12H    levalbuterol  1.25 mg Nebulization 2 times daily    levothyroxine  25 mcg Oral QAM AC    lidocaine  1 patch Transdermal Q24h    liothyronine  2.5 mcg Oral BID    lipids 4 oil  250 mL Intravenous Once per day on Monday Tuesday Wednesday Thursday Friday    [Held by provider] metoprolol  25 mg Per J Tube BID    pantoprazole  40 mg Per J Tube BID    phosphorus tablet 250 mg  250 mg Oral BID    predniSONE  5 mg Per J Tube QAM    And    predniSONE  2.5 mg Per J Tube QPM    [Held by provider] sevelamer carbonate (RENVELA)  0.8 g Oral BID    sodium chloride (PF)  10 mL Intracatheter Q8H    tacrolimus  4 mg Per G Tube QAM    And    tacrolimus  4 mg Per G Tube QPM      dextrose      heparin (porcine) 500 Units/hr (02/24/24 0839)    - MEDICATION INSTRUCTIONS -      parenteral nutrition - ADULT compounded formula CYCLE      parenteral nutrition - ADULT compounded formula CYCLE 38 mL/hr at 03/04/24 3727    -  MEDICATION INSTRUCTIONS -      sodium chloride 0.9%       RABIA Moctezuma

## 2024-03-05 NOTE — PROCEDURES
[facility]    Double Lumen PICC Placement    Date/Time: 3/4/2024 6:20 PM    Performed by: Jayne Pennington RN  Authorized by: Betty Diaz MD  Indications: TPN.      UNIVERSAL PROTOCOL   Site Marked: Yes  Prior Images Obtained and Reviewed:  Yes  Required items: Required blood products, implants, devices and special equipment available    Patient identity confirmed:  Verbally with patient, arm band, provided demographic data and hospital-assigned identification number  Patient was reevaluated immediately before administering moderate or deep sedation or anesthesia  Confirmation Checklist:  Patient's identity using two indicators, relevant allergies, procedure was appropriate and matched the consent or emergent situation and correct equipment/implants were available  Time out: Immediately prior to the procedure a time out was called    Universal Protocol: the [facility] Universal Protocol was followed    Preparation: Patient was prepped and draped in usual sterile fashion       ANESTHESIA    Anesthesia:  See MAR for details  Local Anesthetic:  Lidocaine 1% without epinephrine  Anesthetic Total (mL):  2      SEDATION    Patient Sedated: No        Preparation: skin prepped with ChloraPrep  Skin prep agent: skin prep agent completely dried prior to procedure  Sterile barriers: maximum sterile barriers were used: cap, mask, sterile gown, sterile gloves, and large sterile sheet  Hand hygiene: hand hygiene performed prior to central venous catheter insertion  Type of line used: PICC  Catheter type: double lumen  Lumen type: non-valved and power PICC  Lumen Identification: Purple and Red  Catheter size: 5 Fr  Brand: Bard  Lot number: [id number]  Placement method: venipuncture, MST, ultrasound and tip navigation system  Number of attempts: 1  Difficulty threading catheter: no  Successful placement: yes  Orientation: right  Catheter to Vein (%): 45  Location: basilic  vein (vein diameter-0.40cm)  Tip Location: SVC  Site rationale: left AV fistula  Arm circumference: adults 10 cm  Extremity circumference: 18  Visible catheter length: 1  Total catheter length: 36  Dressing and securement: alcohol impregnated caps, blood cleaned with CHG, chlorhexidine disc applied, glue, site cleansed, statlock, sterile dressing applied and transparent dressing  Post procedure assessment: blood return through all ports, free fluid flow and placement verified by 3CG technology  PROCEDURE   Patient Tolerance:  Patient tolerated the procedure well with no immediate complicationsDescribe Procedure: PICC placement verified by .[email or web address] 3CG tip confirmation system. PICC okay to use.  Disposal: sharps and needle count correct at the end of procedure, needles and guidewire disposed in sharps container

## 2024-03-05 NOTE — PLAN OF CARE
Goal Outcome Evaluation:      Plan of Care Reviewed With: patient    Overall Patient Progress: no changeOverall Patient Progress: no change    Outcome Evaluation: Vitally stable, used bipap all night, critical lab VBG 80 this am, provider advised. Up to commode w/Ax1. Pain in head and at site of new PICC continues, Atarax x1 given, as well as PRN Tylenol Q8. Heat for PICC.

## 2024-03-05 NOTE — PROGRESS NOTES
Pulmonary Medicine  Cystic Fibrosis - Lung Transplant Team  Progress Note  2024     Patient: Sofie Rodriguez  MRN: [id number]  : 1960 (age 63 year old)  Transplant: 2022 (Lung), POD#[id number]  Admission date: 2/10/2024    Assessment & Plan:     Sofie Rodriguez is a 63 year old female with h/o COPD s/p BSLT () with course complicated by post-operative hemidiaphragm palsy, recurrent PNAs, positive DSA, EBV viremia, hypogammaglobulinemia, severe gastroparesis s/p G/J tube placement (22) and pyloric botox (23), GI bleed 2/2 pyloric ulcer, hemobilia s/p ERCP and MRCP, chronic diarrhea, recurrent C diff colitis, ESRD on iHD, recurrent falls with injuries (recent right hip fx s/p ORIF 2023), deconditioning, and FTT.  Admitted 2/10 for initiation of TPN/lipids.  Transferred to ICU  for emergent intubation for hypoxic and hypercapneic respiratory failure with severe respiratory acidosis and encephalopathy.  iHD increased, infectious workup unremarkable.  Extubated .  Continues with persistent and progressive hypercapnia with severe acidosis, returned to ICU - d/t tenuous respiratory status.  Improvement noted with increase in tolerance of NIPPV, now off during the day with stable blood gases.  Discharge pending nutrition plan (likely ongoing TPN/lipids) and stability with regular iHD schedule given additional volume with parenteral nutrition.     Today's recommendations:  - Tacro dose decreased, next level ordered for 3/8  - AVAPS use overnight and with any daytime naps  - Please work with Nursing and RT to document time on and off AVAPS, tidal volumes and  toleration of settings  - Will try to document MIP and MEP  - Continue morning VBG  - Please collect sputum culture if generates sputum  - Repeat DSA ordered 3/6  - Repeat CMV, Prospera, and EBV ordered 3/18  - Now on 4x/week schedule (MTTSa) for dialysis given increased fluid intake with TPN     Acute on chronic  hypoxic/hypercapneic respiratory failure:  S/p bilateral sequential lung transplant for COPD:  Right hemidiaphragm palsy:   Hypoventilation, Suspected CARLEE: CT PTA (2/7) with decreased MARLON opacities but new tree in bud RLL opacities.  PFTs unchanged in clinic (but ATS criteria not met), remain significantly below her baseline.  Baseline hypoxia with 2L NC overnight.  Respiratory decompensation with encephalopathy and severe respiratory acidosis on 2/17.  S/p intubation 2/17-2/19.  Repeat CT (2/17) with new patchy consolidative and nodular opacities, GGO primarily in the bases and intralobular septal thickening (concerning for pulmonary edema given recent addition of TPN nutrition, new infection, PTLD, and/or septic emboli.  Bronch with lavage of RML (2/18) with very friable tissue, cutlures only with C. glabrata.  S/p empiric Zosyn (2/17-2/24) and micafungin (2/18-2/21).  Severe respiratory acidosis with hypercapnia persisting with slight improvement with NIPPV treatment, limited by pt. tolerance to pressure and mask (claustrophobia).  Persistent respiratory failure and variable encephalopathy also complicated by diaphragmatic weakness, hypoventilation, and deconditioning d/t malnutrition.  Repeat CT (2/26) with diffuse GG and consolidative opacities >BLL and diffuse interlobular septal thickening.  Intermittent tolerance of NIPPV persists, reports some improvement in comfort with transition from BiPAP to AVAPS.  Neurology consulted 2/27.  Procal increased to 1.56 (2/28).  Transferred to ICU 2/28 given tenuous respiratory status and potential need for reintubation.  Improvement noted with continuous NIPPV with minimal interruptions (sodium bicarb also stopped, likely partially contributing), trial off NIPPV during the day started 2/29.  MRI brain (3/1) with moderate leukoaraiosis, no acute intracranial pathology. CXR today (3/5) personally reviewed with decreased left effusion, stable right effusion.   - AVAPS at  night and with naps, patient reports using last night  - VBG this am 7.2 with CO2 80, will ask that RT assess and document details of AVAPS tonight to truly see if underventilating (targer tidal volume is 400 which is 8 ml/kg ideal body weight), pending further evaluation can decide if need to adjust AVAPS settings  - Daily AM VBG  - Discuss documentation of MIP/MEP with RT   - Blood cultures (2/28) NGTD  - Sputum cultures ordered if able to collect  - Xopenex BID (2/27)  - Defer ABX at this time  - Sleep clinic eval indicated as OP     Immunosuppression:  ImmuKnow low at 108 on 1/10.  AZA previously stopped 5/2023 d/t low ImmuKnow assay and EBV viremia.  Repeat ImmuKnow (2/27) low at 88, defer dose adjustment (tacro goal already decreased one day prior).  - Tacrolimus 4 mg BID via G tube (decreased 3/2).  Goal level 6-8 (decreased 2/26 d/t ImmuKnow and concern for infection).  11 hr level today 9, will slightly adjust dose to make sure in range, go to 4 mg AM and 3.5 mg PM, repeat level ordered for 3/8  - Prednisone 5 mg qAM / 2.5 mg qPM     Prophylaxis:   - Dapsone 50 mg q MWF for PJP ppx  - CMV ppx not currently indicated, D+/R+, CMV negative 2/19 (BAL very mildly positive 2/18, likely not clinically significant), repeat CMV ordered 3/18     Positive DSA: PFTs and pulmonary symptoms have remained stable as OP, so AMR treatment deferred given frailty.  Most recent DSA (2/7) with DQB2 mfi 6966 (from 5723 one month prior).  Most recent cell-free DNA Prospera (2/18) mildly elevated at 1.04 (concerning for possible rejection), which was increased from prior level of 0.12 (1/10).  IST increase deferred at that time per Dr. Gong.  S/p IVIG (2/27) for DSA (IgG WNL).  - Repeat DSA ordered 3/6  - Repeat Prospera ordered 3/18      EBV viremia: CT CAP (2/7) without lymphadenopathy.  Most recent level (2/18) improved to 28k from 96k (2/7)  - Repeat EBV ordered 3/18     Other relevant problems being managed by the primary  team:      FTT:  Severe protein calorie malnutrition:  Gastroparesis s/p PEG/J, botox, and G-POEM:  SB hypomotility:  Pyloric ulcer:  Chronic nausea and osmotic diarrhea:  SIBO s/p rifaximin:   Recurrent C diff colitis: Chronic osmotic and infectious diarrhea since transplant with recurrent episodes of C diff.  Notable weight loss (40# in a year) d/t diarrhea, GI dysmotility, and intolerance of enteral feeds (PEG/J tube in place), most recently on elemental formula.  Extensive OP eval and f/u with GI.  S/p port placement for TPN and lipids.  Tolerating modest PO intake (likely absorbing minimal per GI), monitoring for refeeding syndrome.    - PO diet for pleasure only  - TPN/lipids per primary, anticipate continued need after discharge given GI concerns for negligible enteral nutritional absorption potential  - CT enterography recommended per GI, but unlikely to be able to tolerate the required 1.5 L enteral contrast bolus so deferring for now     ESRD: CT scan (with declining respiratory status) with volume overload secondary to TPN volume, iHD increased from PTA TThSa schedule with unsustained improvement.  Management per nephrology, dialysis via [facility].    - Transitioned to 4x/week schedule (MTTSa), per Nephrology would likely need this schedule while on TPN     We appreciate the excellent care provided by the [facility] team.  Recommendations communicated via this note and in person.  Will continue to follow along closely, please do not hesitate to call with any questions or concerns.    Yulisa Huff MD PhD  Lung Transplant  Pulmonary Critical Care        Subjective & Interval History:     Overnight reported to wear AVAPS but significant respiratory acidosis in am. Seen this morning while on dialysis. Reports doing AVAPS, mask does make her feel claustrophobic but denied any other significant leaks of mask issues. Having a headache, discussed could be CO2 vs musculoskeletal and will try heating  pad.     Review of Systems:     C: No fever, no chills, no change in weight, no change in appetite  INTEGUMENTARY/SKIN: + Ecchymosis from falls  ENT/MOUTH: No nasal congestion or drainage  RESP: See interval history  CV: No chest pain, no palpitations, + peripheral edema, no orthopnea  GI: + Occ nausea, no vomiting, stable loose stools, no reflux symptoms  : See interval history  MUSCULOSKELETAL: + Shoulder/hip pain  ENDOCRINE: Blood sugars with adequate control  NEURO: No headache, + tingling to hands/feet (at least several weeks but newly reported by pt.)  PSYCHIATRIC: Mood stable    Physical Exam:     All notes, images, and labs from past 24 hours (at minimum) were reviewed.    Vital signs:  Temp: 97.7  F (36.5  C) Temp src: Axillary BP: 133/54 Pulse: 92   Resp: 20 SpO2: 99 % O2 Device: BiPAP/CPAP Oxygen Delivery: 3 LPM   Weight: 47.8 kg (105 lb 6.1 oz)  I/O:     Intake/Output Summary (Last 24 hours) at 3/5/2024 0758  Last data filed at 3/5/2024 0557  Gross per 24 hour   Intake 40 ml   Output 2500 ml   Net -2460 ml         Constitutional: Lying in bed on dialysis, in no apparent distress.   HEENT: Eyes with pink conjunctivae, anicteric.  Oral mucosa moist without lesions.   PULM: Mildly diminished air flow bilaterally on anterior exam.  No crackles, no rhonchi, no wheezes.  Non-labored breathing on RA (but 78% SpO2).  CV: Normal S1 and S2.  RRR.  No murmur, gallop, or rub.  2-3+ BLE edema.   ABD: NABS, soft, nontender, nondistended.  PEG/J tube site not visualized.  MSK: Moves all extremities.  ++ muscle wasting.   NEURO: Asleep but easily arousable, conversant.   SKIN: Warm, dry, fragile, diffuse ecchymosis to extremities and right neck/shoulder.   PSYCH: Mood stable.     Data:     LABS    CMP:   Recent Labs   Lab 03/05/24  0607 03/04/24  1733 03/04/24  0713 03/03/24  1609 03/03/24  0551 03/02/24  1623 03/02/24  1138 03/02/24  0623     --  140  --  138  --   --  138   POTASSIUM 3.3*  --  3.7  --  3.5  --  "  --  3.5   CHLORIDE 102  --  103  --  102  --   --  101   CO2 27  --  30*  --  30*  --   --  27   ANIONGAP 10  --  7  --  6*  --   --  10   *  --  142*  --  129*  --  160* 126*   BUN 44.8*  --  52.0*  --  30.4*  --   --  46.9*   CR 2.61*  --  3.30*  --  2.13*  --   --  3.01*   GFRESTIMATED 20*  --  15*  --  26*  --   --  17*   ESTUARDO 8.7*  --  8.8  --  8.3*  --   --  8.9   MAG 2.7* 2.0 2.6* 2.3 2.1   < >  --  2.2   PHOS 3.4 2.8 2.2* 2.5 2.0*   < >  --  3.3   PROTTOTAL 5.5*  --  5.5*  --  5.1*  --   --  5.3*   ALBUMIN 3.2*  --  3.2*  --  2.9*  --   --  3.1*   BILITOTAL 0.2  --  0.2  --  0.2  --   --  0.2   ALKPHOS 78  --  88  --  79  --   --  70   AST 14  --  13  --  14  --   --  16   ALT 5  --  <5  --  <5  --   --  <5    < > = values in this interval not displayed.     CBC:   Recent Labs   Lab 03/05/24  0607 03/04/24  0713 03/03/24  0551 03/02/24  0623   WBC 3.6* 4.9 3.9* 4.9   RBC 2.44* 2.43* 2.50* 2.49*   HGB 8.7* 8.6* 9.1* 8.6*   HCT 29.8* 29.9* 30.3* 29.1*   * 123* 121* 117*   MCH 35.7* 35.4* 36.4* 34.5*   MCHC 29.2* 28.8* 30.0* 29.6*   RDW 22.5* 22.7* 22.7* 22.8*    212 212 236       INR:   Recent Labs   Lab 03/04/24  0713 02/29/24  0332   INR 0.99 1.09       Glucose:   Recent Labs   Lab 03/05/24  0607 03/04/24  0713 03/03/24  0551 03/02/24  1138 03/02/24  0623 03/01/24  2205   * 142* 129* 160* 126* 120*       Blood Gas:   Recent Labs   Lab 03/05/24  0610 03/04/24  0713 03/03/24  0550   PHV 7.20* 7.17* 7.27*   PCO2V 80* 89* 72*   PO2V 56* 64* 53*   HCO3V 32* 33* 33*   LINDY 2.3 2.5 5.4*   O2PER 0 3 35       Culture Data No results for input(s): \"CULT\" in the last 168 hours.    Virology Data:   Lab Results   Component Value Date    FLUAH1 Not Detected 02/18/2024    FLUAH3 Not Detected 02/18/2024    GI5928 Not Detected 02/18/2024    IFLUB Not Detected 02/18/2024    RSVA Not Detected 02/18/2024    RSVB Not Detected 02/18/2024    PIV1 Not Detected 02/18/2024    PIV2 Not Detected 02/18/2024 "    PIV3 Not Detected 02/18/2024    HMPV Not Detected 02/18/2024       Historical CMV results (last 3 of prior testing):  Lab Results   Component Value Date    CMVQNT Not Detected 02/19/2024    CMVQNT Not Detected 02/07/2024    CMVQNT Not Detected 01/10/2024     Lab Results   Component Value Date    CMVLOG 3.2 07/12/2023    CMVLOG <2.1 04/19/2023    CMVLOG 3.5 01/25/2023       Urine Studies    Recent Labs   Lab Test 02/18/24  0222 05/18/23  0627   URINEPH 7.5* 5.0   NITRITE Negative Negative   LEUKEST Trace* Moderate*   WBCU 66* 21*       Most Recent [facility] Pulmonary Function Testing (FVC/FEV1 only)  FVC-Pre   Date Value Ref Range Status   02/07/2024 1.19 L    01/10/2024 1.12 L    08/29/2023 1.48 L    07/25/2023 1.55 L      FVC-%Pred-Pre   Date Value Ref Range Status   02/07/2024 42 %    01/10/2024 39 %    08/29/2023 53 %    07/25/2023 55 %      FEV1-Pre   Date Value Ref Range Status   02/07/2024 1.13 L    01/10/2024 1.10 L    08/29/2023 1.43 L    07/25/2023 1.54 L      FEV1-%Pred-Pre   Date Value Ref Range Status   02/07/2024 51 %    01/10/2024 49 %    08/29/2023 64 %    07/25/2023 69 %        IMAGING    Recent Results (from the past 48 hour(s))   XR Chest 2 Views    Narrative    Exam: XR CHEST 2 VIEWS, 3/4/2024 8:38 AM    Indication: re-evaluation with persistent CO2 elevation for pulmonary  edema vs infection vs other etiology of respiratory acidosis    Comparison: 2/28/2024 chest x-ray 2/28/2024. CT 2/26/2024    Findings:   Central thoracotomy and bilateral lung transplants. The right IJ  catheter tip is near the superior cavoatrial junction. Increased hilar  fullness, indistinct pulmonary vasculature and septal thickening.  Unchanged left greater than right pleural effusions. Again seen  abnormal positioning of the left major fissure and lobes of the left  allograft.      Impression    Impression:   Pleural effusions with increased diffuse interstitial edema.  Underlying bilateral lung transplantation.    I have  personally reviewed the examination and initial interpretation  and I agree with the findings.    ALVINA HARRY MD         SYSTEM ID:  [id number]

## 2024-03-05 NOTE — PROGRESS NOTES
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 On 2/17/24 she was transferred to ICU for worsening mental status and acute hypoxic and hypercarbic respiratory failure inspite of BiPAP requiring intubation. She was suspected to have PNA and started on antibiotics. Extubated on 2/19 without complications and tolerated iHD on 2/20, and tolerated PO regular diet in addition to TPN. Developed progressively worsening respiratory acidosis and hypercarbia, and was re-admitted to ICU on 2/28/24 for close monitoring of intermittent BiPAP and possible intubation, however she improved on AVAPS setting and well enough to transfer back to medicine floor on 2/29/24. Currently working on balancing volume status with current TPN plan.      Changes today:   - continue Bipap while sleeping (and with naps).     - R-PICC placed. IR will remove the Tunneled CVC  - HD today  -Tacro dose adjusted for goal of 6-8  -Will have RT document TV and exact timing of when Bipap on and off to see if settings need to be changed as the gas has not improved.     #Severe respiratory acidosis, improved  on BiPAP  #Acute hypoxic and hypercarbic respiratory failure  #S/P Lung transplant 2022 c/b right hemidiaphragm palsy  #Recurrent pneumonia, resolved  Patient received a bilateral lung transplant 6/28/22 for COPD. Was admitted 2/10 to address malnutrition and admitted to ICU on 2/17 with hypoxic hypercarbic respiratory failure. Intubated on 2/18 AM  due to worsening oxygen requirements, likely due to pulmonary edema given hx of ESRD requiring HD vs infection given hx of lung transplant, immunosuppressed status, and recurrent pneumonias. Extubated on 2/19 without complications, Micro Follow-up on BAL, viral panel, CMV, fungus, and Blood Cultures show no abnormalities. Does have slowly rising pCO2 on VBG - HFNC did not seem to improve elevated pCO2 and instead seemed to potentially cause worsening, possibly due to depressed respiratory drive from high O2. Patient initially was adamant that she would not tolerate BiPAP, however was agreeable to trial on 2/27 PM. Despite intermittent BiPAP overnight, VBG was worse and patient transferred to ICU on 2/28 AM. RT adjusted BiPAP settings while in ICU to AVAPS with improvement in mental status and VBG, and patient transferred back to medicine floor on 2/29. ICU team additionally added theophylline and thyroid replacement as both can help with diaphragmatic/respiratory muscle weakness in setting of COPD and malnutrition. MRI negative for central problems with respiratory drive.    - neurology consulted to assist with evaluation for possible central etiology of hypercapnic respiratory failure - MRI negative, no central concern  - PRN hypertonic saline inhaler with albuterol nebs  - Continue good pulm toilet  - Transplant pulmonology following, recs appreciated  -Need more data such as exact times of BiPAP and TV setting to know if we need to adjust the settings (gas still not improved w/ wearing)   - PTA immunosuppressive agent  >Prednisone 5 mg every morning, 2.5 mg every  afternoon; tacrolimus 4 mg every morning, 3.5mg every afternoon  > Monitor tacro levels, goal 6-8  > -overnight oximetry study suggestive of O2 vs CPAP need, as did require up to 2LPM overnight to prevent hypoxia  > Try to minimize O2 to preserve respiratory drive, will give IVIG for DSA+   - avoid HFNC, maintain minimum oxygen to keep SaO2 >85%   - continue BiPAP when sleeping (overnight and during naps)  - MIP/MEP testing completed  - CXR 2-view on 3/4 w/ pleural effusions due to vol status  - PTA dapsone MWF for PJP prophylaxis  - completed course of zosyn for empiric HAP course (2/17 - 2-23)  - Initial decompensation likely from opioids - limit medications that would depress respiration   - d/c'd theophylline 3/3/24 due to difficulty attaining therapeutic levels   - continue thyroid function as below  - awaiting metabolic CART study results  - may need BiPAP at home - currently does not have one. urgent sleep clinic referral at discharge        Antibiotics:     Vancomycin (2/17 - 2/17)  Zosyn (2/17 - 2/23)  Dapsone MWF, PJP ppx   Micafungin (2/18- 2/21)     Immunosuppression  Tacrolimus  Prednisone       #Severe malnutrition   #FTT   #Hypoalbuminemia  #Gastroparesis, small bowel dysmotility  #S/P PEG/J with intolerance of enteral nutrition  # Chronic osmotic diarrhea/SIBO s/p Rifaximin > improving     Patient with gastroparesis (presumed due to vagal injury) and small bowel dysmotility complicated by unintentional 40lb weight loss over the past year and now severe malnutrition. Previously intolerant to oral food intake due to nausea. Was initially admitted for portacath and TPN initiation since 2/13. After extubation has been able to tolerate feeds without n/v from 2/20. Electrolytes trending towards baseline today.  Per GI, next steps for workup for malnutrition would include CT enterography to evaluate for anatomic abnormalities contributing to malnutrition vs possible treatment for SIBO (though patient has  had multiple courses of treatment in the past with no long term improvement). Patient couldn't tolerate the oral load for CT enterography on 2/21, so will defer this until she is able to tolerate greater PO load. On 2/23 , again discussed with patient the need of small bowel motility study if not improving outpatient through [place]. No ongoing concerns for SIBO on 2/23 as patient is passing multiple episodes of stools and gas with no abdominal pain or distention. C-diff test negative on 2/21. Per RD, patient tolerating >300 kcal of intake PO currently, so stopped trickle Tube feeds and continuing with PO and TPN for nutrition.   - Regular diet + increased TPN +  no further tube feeds per RD & transplant pulm discussion               - Nephrology: limit fluid < 1.5 L per day for TPN ( chart vol of TPN in the I/O). Renal team okay with midline on RUE                - RD concerned pt is not absorbing any oral intake and they will be monitoring Bowel function, I/O and, PO/TF/TPN intake.               -  stopped TF as PO intake sufficient for preventing gut atrophy  - CT enterography with contrast- Pt not able to tolerate oral contrast load of 1500 ml on 2/21; no ongoing concerns for SIBO, would need small bowel motility study if not improving outpatient through [place]  - Daily Weights  - monitor CMP in the AM        #Acute on chronic Anemia 2/2 ESRD  Hgb have been stable 7-8s, with no acute signs of bleeding, acute drop 2/29 from 8.2 > 6.6 after two rechecks, no overt signs of bleeding noted, patient reports some abdominal pain but otherwise physical exam unremarkable.  LUE US negative for DVT 2/18.    - continue epogen per nephrology with dialysis  - venofer 50 mcg qweek with dialysis  - type and screen performed 2/29, 1 unit pRBCs ordered and transfused      #ESRD on HD M/T/Th/Sat  #Hypervolemic hyponatremia  #Anion gap metabolic acidosis - resolved  Patient is ESRD on T/Th/Sat HD as outpt, Hgb stabilizing. HD tolerating  well. Continuing monitoring electrolytes daily. Anion gap normal since 2/21. Will continue to monitor daily labs/ABG  for refeeding syndrome and acidosis.   - Continue Venofer injections    - CBC and CMP daily  - Continue epogen dose 8000 units as per nephrology  - Strict I/Os  - HD M/T/TH/Sat per nephrology given hypervolemia; Plan for 2hr run on M; 3hr on T/Th/S.   -Start K phos 250 mg bid on 3/4    # Elevated TSH and low T4 and T3  Patient with new low T4 at 0.64 and TSH at 6.5, concerning for new hypothyroidism vs sick thyroid syndrome. TSH with appropriate response, more consistent with elevation in the setting of acute illness. ICU team on 2/28 recommended initiation of treatment for possible hypothyroid as this can improve respiratory muscle function in the setting of weakness and malnutrition.   - continue liothyronine and levothyroxine per ICU team recommendations  - repeat thyroid function studies on 3/7/24; adjust dosing as needed      #Left upper extremity unilateral edema, improving   #Bilateral pedal and ankle edema, improving  Edema due to third spacing due to hypoalbuminemia vs HF. BNP >46374 at admission, Echo on 2/18 shows EF of 55-60% with IVC of <2.1 and collapsing >50% with sniff, normal RA pressure, no significant valvular abnormalities; Left upper extremity swelling and pitting lower extremity edema likely due to hypoalbuminemia improved today. Upper limb duplex USG on 2/18 negative for DVT. Wt went from 43.4 kg on admission to 47.8 kg. She is urinating but cannot chart as she usually urinates with BM. She reports trending to baseline with urination. She denies dysuria, retention symptoms. USG left arm on 2/21 shows steel physiology and Vascular Surgery consulted. Vascular surgery has only minor concerns for steal symptoms and given that the AVF is working well, they are okay with outpatient fistulograms/ venoplasty with wrist brachial index and PPG's, unless new concerns arise. LUE and LE  edema significantly decreased since yesterdays HD. No new tingling/ numbness noticed.  - Continue daily weights  - Strict I/Os  - Elevation and wrapping of only lower legs as needed and increased UF per nephrology for volume management; no wrapping of Left upper extremity with dialysis fistula  - lymphedema consulted for BLE edema  -HD as noted above        #Steal physiology of LUE dialysis access fistula  LUE arterial US obtained 2/21 with concern for LUE edema. US of fistula demonstrated steal physiology. Discussed with nephrology, and vascular surgery consulted on 2/22. Vascular surgery has only minor concerns for steal symptoms and given that the AVF is working well, they are okay with outpatient fistulograms/ venoplasty with wrist brachial index and PPG's, unless new concerns arise.  - plan for outpatient workup as above; nephrology in agreement with outpatient workup.       #Facial and neck bruising  #Pain  Reports soreness in her neck from lying in bed. No soreness in the buttocks/ back. Patient has multiple bruises over her arms and face which is attributed to previous falls. No new bruising reported today. Patients reports her headaches are improving with tylenol. Using heating pads and lidocaine patch for body pains. Couple of small skin tears noted by the Rn's. Skin care done accordingly.  - Tylenol Q4  - Lidocaine patch prn  - Heating pads prn  - Diligent skin cares       #HTN  #A-fib, resolved  Noted atrial fibrillation on arrival with HR elevated at 150, not sustained for > 10 minutes and otherwise hemodynamically stable. Rates stable in the 80's. BP normalizing since 2/22.  - PTA metoprolol 25mg BID - holding for now with lower BP with increased UF per nephrology, resume if becoming more hypertensive  - Continuous telemetry       # Encephalopathy secondary to hypercarbia - resolved  Patient was progressively more lethargic on 2/17 during admission in [facility]. Etiology likely secondary to  hypercarbia in context of respiratory failure as patient was noted to have high CO2s concomitant with lethargy. Though receiving oxycodone and fentanyl, lethargy was only partially alleviated by narcan. LFTs and ammonia wnl with low suspicion of hepatic encephalopathy. BUN wnl with low suspicion for uremic encephalopathy. Head CT on 2/18 was negative with low suspicion of acute intracranial pathology. Patient is awake, alert, oriented and following commands since 2/20.        # Right hip fracture s/p ORIF (December 2023)   - PT/OT       # GERD  - PTA PPI       # Hx EBV viremia   # Hypogammaglobulinemia  # Chronic immunosuppression 2/2 lung transplant  Patient has been afebrile and has not had leukocytosis however she is under immunosuppression. Given acute respiratory failure and hx of recurrent pneumonias, she was started on empiric abx for concerns over new pneumonia. RVP and cultures no growth to date. Last day of empirical treatment for HAP.  - EBV 27K (decreased compared to prior)         Lines/tubes/drains:  - CVC RIJ (for TPN, is tunneled line)   - PIV x2  - PEG/J  - HD AV fistula, left arm         Diet:   Regular diet PO  Majority of nutrition through TPN per RD     General Cares/Prophylaxis:    DVT Prophylaxis: Heparin subcutaneous - resuming with stable Hgb  GI Prophylaxis: PPI  Fluids: As per TPN  Code Status: Full        Diet: Renal Diet (dialysis)  parenteral nutrition - ADULT compounded formula CYCLE  parenteral nutrition - ADULT compounded formula CYCLE    DVT Prophylaxis: resume subQ heparin  Dong Catheter: Not present  Fluids: Per TPN and PO  Lines: PRESENT      PICC 03/04/24 Double Lumen Right Basilic TPN. PICC okay to use.-Site Assessment: WDL  CVC Single Lumen Right Internal jugular Non - valved (open ended);Tunneled;Power injectable-Site Assessment: WDL  Hemodialysis Vascular Access Arteriovenous graft Superior Arm-Site Assessment: WDL      Cardiac Monitoring: None  Code Status: Full Code       Clinically Significant Risk Factors        # Hypokalemia: Lowest K = 3.3 mmol/L in last 2 days, will replace as needed     # Hypomagnesemia: Lowest Mg = 1.6 mg/dL in last 2 days, will replace as needed   # Hypoalbuminemia: Lowest albumin = 2.6 g/dL at 2/18/2024  5:13 AM, will monitor as appropriate             # Severe Malnutrition: based on nutrition assessment      # Financial/Environmental Concerns: none         Disposition Plan         The patient's care was discussed with the Attending physician Dr. Mendoza, Bedside Nurse, Patient, and Transplant Pulm Consultant(s).    Betty Diaz, PGY4  Internal Medicine-Pediatrics    [address]  Securely message with [employer] (more info)  Text page via [employer] Paging/Directory   See signed in provider for up to date coverage information  ______________________________________________________________________    Interval History   No acute events overnight. VBG at baseline with pCO2 in 80s  with BiPAP all night. Pain in head and at site of new PICC continues, Atarax x1 given, as well as PRN Tylenol Q8.     Physical Exam   Vital Signs: Temp: (P) 98.1  F (36.7  C) Temp src: (P) Oral BP: (!) (P) 181/70 Pulse: (P) 88   Resp: (P) 16 SpO2: (P) 100 % O2 Device: (P) Nasal cannula Oxygen Delivery: (P) 1.5 LPM  Weight: 105 lbs 6.08 oz  General: thin, laying in bed, sleeping comfortably   HEENT: Normocephalic, bruising on the right face around right orbit with hematoma and right neck, improved  Neuro: very awake and alert, fully oriented, following commands, answering questions clearly and easily, moving all extremities  Pulm/Resp: breathing on NC,  no increased work of breathing  CV: appears well perfused       Medical Decision Making       Please see A&P for additional details of medical decision making.      Data     I have personally reviewed the following data over the past 24 hrs:    3.6 (L)  \   8.7 (L)    / 214     139 102 44.8 (H) /  135 (H)   3.3 (L) 27 2.61 (H) \     ALT: 5 AST: 14 AP: 78 TBILI: 0.2   ALB: 3.2 (L) TOT PROTEIN: 5.5 (L) LIPASE: N/A       Imaging results reviewed over the past 24 hrs:   Recent Results (from the past 24 hour(s))   XR Chest 2 Views    Narrative    XR CHEST 2 VIEWS  3/5/2024 1:03 PM     HISTORY:  Interval f/u       COMPARISON:  3/4/2024 CXR    TECHNIQUE: Upright PA and Lateral radiographs of the chest.     FINDINGS:   Postsurgical changes of bilateral lung transportation with intact  clamshell sternotomy wires. Dual lumen right internal jugular central  venous catheter tip terminates in the right atrium. Right upper  extremity PICC line tip terminates in the right atrium as well.    Cardiomediastinal silhouette is within normal limits. Trachea is  midline. Blunting of the bilateral costophrenic angles, left greater  than right. Unchanged perihilar fullness with indistinct pulmonary  vasculature and septal thickening. Prominent convexity at the upper  left mediastinal border in the expected location of an enlarged  pulmonary artery which is confirmed on review of a CT scan of the  chest dictated 2/17/2024. Abnormal positioning of the left major  fissure and lobes of the left allograft.     Upper abdomen appears normal. No acute osseous abnormalities. Presumed  gastrojejunostomy balloon tip and catheters partially visualized.      Impression    IMPRESSION:  1. Stable right-sided pleural effusion with decreased size of the left  pleural effusion with improved aeration of the left lung.  2. Grossly stable interstitial opacification in both lung fields is  consistent with mild pulmonary edema.  3. Pulmonary artery enlargement suggestive of pulmonary hypertension.    I have personally reviewed the examination and initial interpretation  and I agree with the findings.    ALVINA HARRY MD         SYSTEM ID:  [id number]

## 2024-03-06 ENCOUNTER — APPOINTMENT (OUTPATIENT)
Dept: PHYSICAL THERAPY | Facility: CLINIC | Age: 64
DRG: 640 | End: 2024-03-06
Attending: INTERNAL MEDICINE
Payer: MEDICARE

## 2024-03-06 ENCOUNTER — APPOINTMENT (OUTPATIENT)
Dept: INTERVENTIONAL RADIOLOGY/VASCULAR | Facility: CLINIC | Age: 64
DRG: 640 | End: 2024-03-06
Payer: MEDICARE

## 2024-03-06 LAB
ALBUMIN SERPL BCG-MCNC: 3.4 G/DL (ref 3.5–5.2)
ALP SERPL-CCNC: 86 U/L (ref 40–150)
ALT SERPL W P-5'-P-CCNC: <5 U/L (ref 0–50)
ANION GAP SERPL CALCULATED.3IONS-SCNC: 11 MMOL/L (ref 7–15)
AST SERPL W P-5'-P-CCNC: 14 U/L (ref 0–45)
BASE EXCESS BLDV CALC-SCNC: 2.1 MMOL/L (ref -3–3)
BASOPHILS # BLD AUTO: ABNORMAL 10*3/UL
BASOPHILS # BLD MANUAL: 0 10E3/UL (ref 0–0.2)
BASOPHILS NFR BLD AUTO: ABNORMAL %
BASOPHILS NFR BLD MANUAL: 0 %
BILIRUB SERPL-MCNC: 0.2 MG/DL
BUN SERPL-MCNC: 37.4 MG/DL (ref 8–23)
CALCIUM SERPL-MCNC: 9 MG/DL (ref 8.8–10.2)
CHLORIDE SERPL-SCNC: 103 MMOL/L (ref 98–107)
CREAT SERPL-MCNC: 2.47 MG/DL (ref 0.51–0.95)
DEPRECATED HCO3 PLAS-SCNC: 25 MMOL/L (ref 22–29)
EGFRCR SERPLBLD CKD-EPI 2021: 22 ML/MIN/1.73M2
EOSINOPHIL # BLD AUTO: ABNORMAL 10*3/UL
EOSINOPHIL # BLD MANUAL: 0 10E3/UL (ref 0–0.7)
EOSINOPHIL NFR BLD AUTO: ABNORMAL %
EOSINOPHIL NFR BLD MANUAL: 1 %
ERYTHROCYTE [DISTWIDTH] IN BLOOD BY AUTOMATED COUNT: 22.2 % (ref 10–15)
GLUCOSE SERPL-MCNC: 112 MG/DL (ref 70–99)
HCO3 BLDV-SCNC: 31 MMOL/L (ref 21–28)
HCT VFR BLD AUTO: 31.9 % (ref 35–47)
HGB BLD-MCNC: 9.4 G/DL (ref 11.7–15.7)
HOLD SPECIMEN: NORMAL
IMM GRANULOCYTES # BLD: ABNORMAL 10*3/UL
IMM GRANULOCYTES NFR BLD: ABNORMAL %
LYMPHOCYTES # BLD AUTO: ABNORMAL 10*3/UL
LYMPHOCYTES # BLD MANUAL: 0.4 10E3/UL (ref 0.8–5.3)
LYMPHOCYTES NFR BLD AUTO: ABNORMAL %
LYMPHOCYTES NFR BLD MANUAL: 8 %
MAGNESIUM SERPL-MCNC: 2 MG/DL (ref 1.7–2.3)
MAGNESIUM SERPL-MCNC: 2.1 MG/DL (ref 1.7–2.3)
MCH RBC QN AUTO: 36.4 PG (ref 26.5–33)
MCHC RBC AUTO-ENTMCNC: 29.5 G/DL (ref 31.5–36.5)
MCV RBC AUTO: 124 FL (ref 78–100)
METAMYELOCYTES # BLD MANUAL: 0.2 10E3/UL
METAMYELOCYTES NFR BLD MANUAL: 5 %
MONOCYTES # BLD AUTO: ABNORMAL 10*3/UL
MONOCYTES # BLD MANUAL: 0.2 10E3/UL (ref 0–1.3)
MONOCYTES NFR BLD AUTO: ABNORMAL %
MONOCYTES NFR BLD MANUAL: 5 %
MYELOCYTES # BLD MANUAL: 0.1 10E3/UL
MYELOCYTES NFR BLD MANUAL: 3 %
NEUTROPHILS # BLD AUTO: ABNORMAL 10*3/UL
NEUTROPHILS # BLD MANUAL: 3.6 10E3/UL (ref 1.6–8.3)
NEUTROPHILS NFR BLD AUTO: ABNORMAL %
NEUTROPHILS NFR BLD MANUAL: 78 %
NRBC # BLD AUTO: 0 10E3/UL
NRBC BLD AUTO-RTO: 0 /100
O2/TOTAL GAS SETTING VFR VENT: 35 %
OXYHGB MFR BLDV: 71 % (ref 70–75)
PCO2 BLDV: 71 MM HG (ref 40–50)
PH BLDV: 7.24 [PH] (ref 7.32–7.43)
PHOSPHATE SERPL-MCNC: 4.6 MG/DL (ref 2.5–4.5)
PHOSPHATE SERPL-MCNC: 4.6 MG/DL (ref 2.5–4.5)
PLAT MORPH BLD: ABNORMAL
PLATELET # BLD AUTO: 216 10E3/UL (ref 150–450)
PO2 BLDV: 40 MM HG (ref 25–47)
POTASSIUM SERPL-SCNC: 3.7 MMOL/L (ref 3.4–5.3)
PROT SERPL-MCNC: 5.8 G/DL (ref 6.4–8.3)
RBC # BLD AUTO: 2.58 10E6/UL (ref 3.8–5.2)
RBC MORPH BLD: ABNORMAL
SAO2 % BLDV: 73 % (ref 70–75)
SODIUM SERPL-SCNC: 139 MMOL/L (ref 135–145)
WBC # BLD AUTO: 4.6 10E3/UL (ref 4–11)
ZINC SERPL-MCNC: 48.3 UG/DL

## 2024-03-06 PROCEDURE — 86832 HLA CLASS I HIGH DEFIN QUAL: CPT | Performed by: PHYSICIAN ASSISTANT

## 2024-03-06 PROCEDURE — 86833 HLA CLASS II HIGH DEFIN QUAL: CPT | Performed by: PHYSICIAN ASSISTANT

## 2024-03-06 PROCEDURE — 120N000002 HC R&B MED SURG/OB UMMC

## 2024-03-06 PROCEDURE — 94640 AIRWAY INHALATION TREATMENT: CPT

## 2024-03-06 PROCEDURE — 999N000157 HC STATISTIC RCP TIME EA 10 MIN

## 2024-03-06 PROCEDURE — 84100 ASSAY OF PHOSPHORUS: CPT

## 2024-03-06 PROCEDURE — 99232 SBSQ HOSP IP/OBS MODERATE 35: CPT | Mod: GC | Performed by: STUDENT IN AN ORGANIZED HEALTH CARE EDUCATION/TRAINING PROGRAM

## 2024-03-06 PROCEDURE — 250N000013 HC RX MED GY IP 250 OP 250 PS 637

## 2024-03-06 PROCEDURE — 250N000011 HC RX IP 250 OP 636

## 2024-03-06 PROCEDURE — 99232 SBSQ HOSP IP/OBS MODERATE 35: CPT | Performed by: NURSE PRACTITIONER

## 2024-03-06 PROCEDURE — 80053 COMPREHEN METABOLIC PANEL: CPT

## 2024-03-06 PROCEDURE — 250N000012 HC RX MED GY IP 250 OP 636 PS 637

## 2024-03-06 PROCEDURE — 94660 CPAP INITIATION&MGMT: CPT

## 2024-03-06 PROCEDURE — 36592 COLLECT BLOOD FROM PICC: CPT

## 2024-03-06 PROCEDURE — 36589 REMOVAL TUNNELED CV CATH: CPT | Performed by: RADIOLOGY

## 2024-03-06 PROCEDURE — 250N000009 HC RX 250

## 2024-03-06 PROCEDURE — 250N000009 HC RX 250: Performed by: RADIOLOGY

## 2024-03-06 PROCEDURE — 36592 COLLECT BLOOD FROM PICC: CPT | Performed by: PHYSICIAN ASSISTANT

## 2024-03-06 PROCEDURE — 97530 THERAPEUTIC ACTIVITIES: CPT | Mod: GP | Performed by: REHABILITATION PRACTITIONER

## 2024-03-06 PROCEDURE — 85007 BL SMEAR W/DIFF WBC COUNT: CPT

## 2024-03-06 PROCEDURE — 94640 AIRWAY INHALATION TREATMENT: CPT | Mod: 76

## 2024-03-06 PROCEDURE — 250N000009 HC RX 250: Performed by: INTERNAL MEDICINE

## 2024-03-06 PROCEDURE — 36415 COLL VENOUS BLD VENIPUNCTURE: CPT

## 2024-03-06 PROCEDURE — 83735 ASSAY OF MAGNESIUM: CPT

## 2024-03-06 PROCEDURE — 85014 HEMATOCRIT: CPT

## 2024-03-06 PROCEDURE — 250N000009 HC RX 250: Performed by: STUDENT IN AN ORGANIZED HEALTH CARE EDUCATION/TRAINING PROGRAM

## 2024-03-06 PROCEDURE — 82805 BLOOD GASES W/O2 SATURATION: CPT

## 2024-03-06 PROCEDURE — 97110 THERAPEUTIC EXERCISES: CPT | Mod: GP | Performed by: REHABILITATION PRACTITIONER

## 2024-03-06 PROCEDURE — 36589 REMOVAL TUNNELED CV CATH: CPT

## 2024-03-06 PROCEDURE — 97116 GAIT TRAINING THERAPY: CPT | Mod: GP | Performed by: REHABILITATION PRACTITIONER

## 2024-03-06 PROCEDURE — 250N000012 HC RX MED GY IP 250 OP 636 PS 637: Performed by: STUDENT IN AN ORGANIZED HEALTH CARE EDUCATION/TRAINING PROGRAM

## 2024-03-06 RX ORDER — LIDOCAINE HYDROCHLORIDE 10 MG/ML
1-30 INJECTION, SOLUTION EPIDURAL; INFILTRATION; INTRACAUDAL; PERINEURAL
Status: COMPLETED | OUTPATIENT
Start: 2024-03-06 | End: 2024-03-06

## 2024-03-06 RX ADMIN — LEVALBUTEROL HYDROCHLORIDE 1.25 MG: 1.25 SOLUTION RESPIRATORY (INHALATION) at 08:39

## 2024-03-06 RX ADMIN — TACROLIMUS 3.5 MG: 5 CAPSULE ORAL at 18:10

## 2024-03-06 RX ADMIN — Medication 40 MG: at 10:53

## 2024-03-06 RX ADMIN — DAPSONE 50 MG: 100 TABLET ORAL at 10:53

## 2024-03-06 RX ADMIN — SMOFLIPID 250 ML: 6; 6; 5; 3 INJECTION, EMULSION INTRAVENOUS at 20:51

## 2024-03-06 RX ADMIN — TACROLIMUS 4 MG: 5 CAPSULE ORAL at 10:52

## 2024-03-06 RX ADMIN — LIDOCAINE 4% 1 PATCH: 40 PATCH TOPICAL at 20:13

## 2024-03-06 RX ADMIN — PREDNISONE 2.5 MG: 2.5 TABLET ORAL at 20:18

## 2024-03-06 RX ADMIN — LEVOTHYROXINE SODIUM 25 MCG: 0.03 TABLET ORAL at 06:39

## 2024-03-06 RX ADMIN — ACETAMINOPHEN 975 MG: 325 TABLET, FILM COATED ORAL at 20:38

## 2024-03-06 RX ADMIN — CALCIUM CARBONATE 600 MG (1,500 MG)-VITAMIN D3 400 UNIT TABLET 1 TABLET: at 10:52

## 2024-03-06 RX ADMIN — LIDOCAINE HYDROCHLORIDE 7 ML: 10 INJECTION, SOLUTION EPIDURAL; INFILTRATION; INTRACAUDAL; PERINEURAL at 09:59

## 2024-03-06 RX ADMIN — CALCIUM CARBONATE 600 MG (1,500 MG)-VITAMIN D3 400 UNIT TABLET 1 TABLET: at 18:10

## 2024-03-06 RX ADMIN — LEVALBUTEROL HYDROCHLORIDE 1.25 MG: 1.25 SOLUTION RESPIRATORY (INHALATION) at 20:00

## 2024-03-06 RX ADMIN — Medication 40 MG: at 20:18

## 2024-03-06 RX ADMIN — PREDNISONE 5 MG: 5 TABLET ORAL at 10:53

## 2024-03-06 RX ADMIN — CALCIUM CARBONATE 600 MG (1,500 MG)-VITAMIN D3 400 UNIT TABLET 1 TABLET: at 14:01

## 2024-03-06 RX ADMIN — CYANOCOBALAMIN TAB 500 MCG 500 MCG: 500 TAB at 10:53

## 2024-03-06 RX ADMIN — Medication 2.5 MCG: at 10:52

## 2024-03-06 RX ADMIN — HEPARIN SODIUM 5000 UNITS: 5000 INJECTION, SOLUTION INTRAVENOUS; SUBCUTANEOUS at 06:39

## 2024-03-06 RX ADMIN — HEPARIN SODIUM 5000 UNITS: 5000 INJECTION, SOLUTION INTRAVENOUS; SUBCUTANEOUS at 18:10

## 2024-03-06 RX ADMIN — Medication 2.5 MCG: at 20:18

## 2024-03-06 RX ADMIN — MAGNESIUM SULFATE HEPTAHYDRATE: 500 INJECTION, SOLUTION INTRAMUSCULAR; INTRAVENOUS at 20:49

## 2024-03-06 RX ADMIN — LOPERAMIDE HYDROCHLORIDE 2 MG: 2 CAPSULE ORAL at 11:10

## 2024-03-06 ASSESSMENT — ACTIVITIES OF DAILY LIVING (ADL)
ADLS_ACUITY_SCORE: 36

## 2024-03-06 NOTE — IR NOTE
Patient Name: Sofie Rodriguez  Medical Record Number: [id number]  Today's Date: 3/6/2024    Procedure: Tunneled central venous catheter removal   Proceduralist: Dr. Paul  Pathology present: N/A    Procedure Start: 0954  Procedure end: 1000  Sedation medications administered: local only     Report given to: RIYA Purvis 7C  : N/A    Other Notes: Pt arrived to IR room 7 from . Consent reviewed. Pt denies any questions or concerns regarding procedure. Pt positioned supine and monitored per protocol.     No tip culture ordered; line no longer needed per IR consult note.     Pt tolerated procedure without any noted complications. Pt transferred back to .

## 2024-03-06 NOTE — PLAN OF CARE
Goal Outcome Evaluation:      Plan of Care Reviewed With: patient    Overall Patient Progress: no changeOverall Patient Progress: no change    Outcome Evaluation: BiPAP at HS and with naps, manage pain, IR consult for CVC removal    Assumed cares [id number].A&O and able to make needs known. HTN; other VSS on 0-2 LPM via nc. Headache this morning with Tylenol given. Cycled TPN finished this afternoon. Pt went to HD today and tolerated well. Good appetite at meals. New order for IR consult for CVC removal. Pt to wear BiPAP @ HS and with naps during the day. Continue with plan of care.

## 2024-03-06 NOTE — PROCEDURES
[facility]    Procedure: Right tunneled CVC removal    Date/Time: 3/6/2024 10:03 AM    Performed by: Manpreet Paul MD  Authorized by: Manpreet Paul MD      UNIVERSAL PROTOCOL   Site Marked: NA  Prior Images Obtained and Reviewed:  Yes  Required items: Required blood products, implants, devices and special equipment available    Patient identity confirmed:  Verbally with patient, arm band, provided demographic data and hospital-assigned identification number  Patient was reevaluated immediately before administering moderate or deep sedation or anesthesia  Confirmation Checklist:  Patient's identity using two indicators, relevant allergies, procedure was appropriate and matched the consent or emergent situation and correct equipment/implants were available  Time out: Immediately prior to the procedure a time out was called    Universal Protocol: the Joint Commission Universal Protocol was followed    Preparation: Patient was prepped and draped in usual sterile fashion       ANESTHESIA    Anesthesia:  Local infiltration  Local Anesthetic:  Lidocaine 1% without epinephrine      SEDATION    Patient Sedated: No    See dictated procedure note for full details.  Findings: Right SL tunneled CVC removed completely.    Specimens: none    Complications: None    Condition: Stable      PROCEDURE    Patient Tolerance:  Patient tolerated the procedure well with no immediate complications  Length of time physician/provider present for 1:1 monitoring during sedation: 0

## 2024-03-06 NOTE — CONSULTS
"      [facility] Consult Service Note  Interventional Radiology  03/06/24   8:06 AM    Consult Requested: \"remove tunneled CVC line\"    Recommendations/Plan:    -Patient is approved for removal of her right tunneled CVC (5 Fr, 24 cm, SL) placed on 2/14/24 for TPN.    -Timing of procedure is TBD based on IR staffing/schedule and triage.  -Please contact the IR charge RN at [phone] for estimated time of procedure.   -Labs WNL for procedure: INR 0.99, Plt 216.  -Orders entered for procedure. No NPO required.  -Medications to be held include: none.  -Informed consent will be completed prior to procedure.   -Case and imaging discussed with IR attending Dr. Manpreet Paul MD.  -Recommendations were reviewed with requesting team.    History of Present Illness:  Sofie Rodriguez is a 63 year old female with an IR right TCVC placed on 2/14/24 for TPN. Patient is no longer needing TPN nor this line. IR asked to removed line.    Expected date of discharge: TBD    Vitals:   BP (!) 166/59 (BP Location: Right leg)   Pulse 100   Temp 98.8  F (37.1  C) (Oral)   Resp 14   Ht 1.57 m (5' 1.81\")   Wt 47.8 kg (105 lb 6.1 oz)   SpO2 93%   BMI 19.39 kg/m      Pertinent Labs Reviewed:  CBC:  Lab Results   Component Value Date    WBC 4.6 03/06/2024    WBC 3.6 (L) 03/05/2024    WBC 4.9 03/04/2024    WBC 5.8 06/30/2021    WBC 5.2 06/14/2021     Lab Results   Component Value Date    HGB 9.4 03/06/2024    HGB 8.7 03/05/2024    HGB 8.6 03/04/2024    HGB 11.8 06/30/2021    HGB 12.9 06/30/2021    HGB 12.5 06/14/2021     Lab Results   Component Value Date     03/06/2024     03/05/2024     03/04/2024     06/30/2021     06/14/2021    INR:  Lab Results   Component Value Date    INR 0.99 03/04/2024    INR 0.9 05/12/2023    INR 0.93 06/14/2021    PTT 25 08/12/2022    PTT 28 06/14/2021          COVID Results:  COVID-19 Antibody Results, Testing for Immunity          4/29/2022    11:46 "   COVID-19 Antibody Results, Testing for Immunity   SARS-CoV-2 Nucleocapsid Total Ab, S Negative      COVID-19 PCR Results          8/5/2022    15:32 9/9/2022    18:11 9/21/2022    14:42 9/29/2022    09:50 11/30/2022    10:08 12/16/2022    08:02 3/6/2023    23:04 5/17/2023    17:01 2/11/2024    17:53   COVID-19 PCR Results   COVID-19 Virus PCR to [place] - Result      Undetected          COVID-19 Virus by PCR (External Result)     Negative     Undetected     Negative         SARS CoV2 PCR Negative  Negative  Negative  Negative     Negative  Negative        2/17/2024    18:20   COVID-19 PCR Results   COVID-19 Virus PCR to [place] - Result    COVID-19 Virus by PCR (External Result)    SARS CoV2 PCR Negative       Details          This result is from an external source.            Potassium   Date Value Ref Range Status   03/06/2024 3.7 3.4 - 5.3 mmol/L Final   06/30/2021 4.4 3.4 - 5.3 mmol/L Final     Potassium Whole Blood   Date Value Ref Range Status   02/18/2024 2.8 (L) 3.4 - 5.3 mmol/L Final          Lynnette Kelly PA-C  Interventional Radiology

## 2024-03-06 NOTE — PROGRESS NOTES
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 On 2/17/24 she was transferred to ICU for worsening mental status and acute hypoxic and hypercarbic respiratory failure inspite of BiPAP requiring intubation. She was suspected to have PNA and started on antibiotics. Extubated on 2/19 without complications and tolerated iHD on 2/20, and tolerated PO regular diet in addition to TPN. Developed progressively worsening respiratory acidosis and hypercarbia, and was re-admitted to ICU on 2/28/24 for close monitoring of intermittent BiPAP and possible intubation, however she improved on AVAPS setting and well enough to transfer back to medicine floor on 2/29/24. Currently working on balancing volume status with current TPN plan. RT continue to assess and document details of AVAPS to determine if patient is being underventilated. Patient will have tunneled CVC access removed today by IR.    Changes today:   - Encouraged to continue Bipap while sleeping (and with naps).     - R-PICC placed on 3/4.   - IR removed tunneled CVC today 3/6 without complications  - HD yesterday  -  Tacro dose adjusted for goal of 6-8  - RT continue to document TV and exact timing of when Bipap on and off to see if settings need to be changed as the gas has not improved.     #Severe respiratory acidosis, improved on BiPAP  #Acute hypoxic and hypercarbic respiratory failure  #S/P Lung transplant 2022 c/b right hemidiaphragm palsy  #Recurrent pneumonia, resolved  Patient received a bilateral lung transplant 6/28/22 for COPD. Was admitted 2/10 to address malnutrition and admitted to ICU on 2/17 with hypoxic hypercarbic respiratory failure. Intubated on 2/18 AM  due to worsening oxygen requirements, likely due to pulmonary edema given hx of ESRD requiring HD vs infection given hx of lung transplant, immunosuppressed status, and recurrent pneumonias. Extubated on 2/19 without complications, Micro Follow-up on BAL, viral panel, CMV, fungus, and Blood Cultures show no abnormalities. Does have slowly rising pCO2 on VBG - HFNC did not seem to improve elevated pCO2 and instead seemed to potentially cause worsening, possibly due to depressed respiratory drive from high O2. Patient initially was adamant that she would not tolerate BiPAP, however was agreeable to trial on 2/27 PM. Despite intermittent BiPAP overnight, VBG was worse and patient transferred to ICU on 2/28 AM. RT adjusted BiPAP settings while in ICU to AVAPS with improvement in mental status and VBG, and patient transferred back to medicine floor on 2/29. ICU team additionally added theophylline and thyroid replacement as both can help with diaphragmatic/respiratory muscle weakness in setting of COPD and malnutrition. MRI negative for central problems with respiratory drive.    - neurology consulted to assist with evaluation for possible central etiology of hypercapnic respiratory failure - MRI negative, no central concern  - PRN hypertonic saline inhaler with albuterol nebs  - Continue good pulm toilet  - Transplant pulmonology following, recs  appreciated  -Need more data such as exact times of BiPAP and TV setting to know if we need to adjust the settings (gas still not improved w/ wearing)   - PTA immunosuppressive agent  >Prednisone 5 mg every morning, 2.5 mg every afternoon; tacrolimus 4 mg every morning, 3.5mg every afternoon  > Monitor tacro levels, goal 6-8  > -overnight oximetry study suggestive of O2 vs CPAP need, as did require up to 2LPM overnight to prevent hypoxia  > Try to minimize O2 to preserve respiratory drive, will give IVIG for DSA+   - avoid HFNC, maintain minimum oxygen to keep SaO2 >85%   - continue BiPAP when sleeping (overnight and during naps)  - MIP/MEP testing completed  - CXR 2-view on 3/4 w/ pleural effusions due to vol status  - PTA dapsone MWF for PJP prophylaxis  - completed course of zosyn for empiric HAP course (2/17 - 2-23)  - Initial decompensation likely from opioids - limit medications that would depress respiration   - d/c'd theophylline 3/3/24 due to difficulty attaining therapeutic levels   - continue thyroid function as below  - awaiting metabolic CART study results  - may need BiPAP at home as patient reports not having one. Sleep clinic referral at discharge     Antibiotics:  Vancomycin (2/17 - 2/17)  Zosyn (2/17 - 2/23)  Dapsone MWF, PJP ppx   Micafungin (2/18- 2/21)     Immunosuppression  Tacrolimus  Prednisone       #Severe malnutrition   #FTT   #Hypoalbuminemia  #Gastroparesis, small bowel dysmotility  #S/P PEG/J with intolerance of enteral nutrition  # Chronic osmotic diarrhea/SIBO s/p Rifaximin > improving     Patient with gastroparesis (presumed due to vagal injury) and small bowel dysmotility complicated by unintentional 40lb weight loss over the past year and now severe malnutrition. Previously intolerant to oral food intake due to nausea. Was initially admitted for portacath and TPN initiation since 2/13. After extubation has been able to tolerate feeds without n/v from 2/20. Electrolytes trending  towards baseline today.  Per GI, next steps for workup for malnutrition would include CT enterography to evaluate for anatomic abnormalities contributing to malnutrition vs possible treatment for SIBO (though patient has had multiple courses of treatment in the past with no long term improvement). Patient couldn't tolerate the oral load for CT enterography on 2/21, so will defer this until she is able to tolerate greater PO load. On 2/23 , again discussed with patient the need of small bowel motility study if not improving outpatient through [facility]. No ongoing concerns for SIBO on 2/23 as patient is passing multiple episodes of stools and gas with no abdominal pain or distention. C-diff test negative on 2/21. Per RD, patient tolerating >300 kcal of intake PO currently, so stopped trickle Tube feeds and continuing with PO and TPN for nutrition.   - Regular diet + increased TPN +  no further tube feeds per RD & transplant pulm discussion               - Nephrology: limit fluid < 1.5 L per day for TPN ( chart vol of TPN in the I/O). Renal team okay with midline on RUE                - RD concerned pt is not absorbing any oral intake and they will be monitoring Bowel function, I/O and, PO/TF/TPN intake.               -  stopped TF as PO intake sufficient for preventing gut atrophy  - CT enterography with contrast- Pt not able to tolerate oral contrast load of 1500 ml on 2/21; no ongoing concerns for SIBO, would need small bowel motility study if not improving outpatient through [facility]  - Continue daily Weights  - Continue to monitor CMP in the AM    #Acute on chronic Anemia 2/2 ESRD  Hgb have been stable 7-8s, with no acute signs of bleeding, acute drop 2/29 from 8.2 > 6.6 after two rechecks, no overt signs of bleeding noted, patient reports some abdominal pain but otherwise physical exam unremarkable.  LUE US negative for DVT 2/18.    - continue epogen per nephrology with dialysis  - venofer 50 mcg qweek with dialysis  -  type and screen performed 2/29, 1 unit pRBCs ordered and transfused      #ESRD on HD M/T/Th/Sat  #Hypervolemic hyponatremia  #Anion gap metabolic acidosis - resolved  Patient is ESRD on T/Th/Sat HD as outpt, Hgb stabilizing. HD tolerating well. Continuing monitoring electrolytes daily. Anion gap normal since 2/21. Will continue to monitor daily labs/ABG  for refeeding syndrome and acidosis.   - Continue Venofer injections    - CBC and CMP daily  - Continue epogen dose 8000 units as per nephrology  - Strict I/Os  - HD M/T/TH/Sat per nephrology given hypervolemia; Plan for 2hr run on M; 3hr on T/Th/S.   -Start K phos 250 mg bid on 3/4    # Elevated TSH and low T4 and T3  Patient with new low T4 at 0.64 and TSH at 6.5, concerning for new hypothyroidism vs sick thyroid syndrome. TSH with appropriate response, more consistent with elevation in the setting of acute illness. ICU team on 2/28 recommended initiation of treatment for possible hypothyroid as this can improve respiratory muscle function in the setting of weakness and malnutrition.   - continue liothyronine and levothyroxine per ICU team recommendations  - repeat thyroid function studies planned for 3/7/24; adjust dosing as needed      #Left upper extremity unilateral edema, improving   #Bilateral pedal and ankle edema, improving  Edema due to third spacing due to hypoalbuminemia vs HF. BNP >76320 at admission, Echo on 2/18 shows EF of 55-60% with IVC of <2.1 and collapsing >50% with sniff, normal RA pressure, no significant valvular abnormalities; Left upper extremity swelling and pitting lower extremity edema likely due to hypoalbuminemia improved today. Upper limb duplex USG on 2/18 negative for DVT. Wt went from 43.4 kg on admission to 47.8 kg. She is urinating but cannot chart as she usually urinates with BM. She reports trending to baseline with urination. She denies dysuria, retention symptoms. USG left arm on 2/21 shows steel physiology and Vascular  Surgery consulted. Vascular surgery has only minor concerns for steal symptoms and given that the AVF is working well, they are okay with outpatient fistulograms/ venoplasty with wrist brachial index and PPG's, unless new concerns arise. LUE and LE edema significantly decreased since yesterdays HD. No new tingling/ numbness noticed.  - Continue daily weights  - Strict I/Os  - Elevation and wrapping of only lower legs as needed and increased UF per nephrology for volume management; no wrapping of Left upper extremity with dialysis fistula  - lymphedema consulted for BLE edema  -HD as noted above        #Steal physiology of LUE dialysis access fistula  LUE arterial US obtained 2/21 with concern for LUE edema. US of fistula demonstrated steal physiology. Discussed with nephrology, and vascular surgery consulted on 2/22. Vascular surgery has only minor concerns for steal symptoms and given that the AVF is working well, they are okay with outpatient fistulograms/ venoplasty with wrist brachial index and PPG's, unless new concerns arise.  - plan for outpatient workup as above; nephrology in agreement with outpatient workup.     #Facial and neck bruising  #Pain  Reports soreness in her neck from lying in bed. No soreness in the buttocks/ back. Patient has multiple bruises over her arms and face which is attributed to previous falls. No new bruising reported today. Patients reports her headaches are improving with tylenol. Using heating pads and lidocaine patch for body pains. Couple of small skin tears noted by the Rn's. Skin care done accordingly.  - Tylenol Q4  - Lidocaine patch prn  - Heating pads prn  - Diligent skin cares    #HTN  #A-fib, resolved  Noted atrial fibrillation on arrival with HR elevated at 150, not sustained for > 10 minutes and otherwise hemodynamically stable. Rates stable in the 80's. BP normalizing since 2/22.  - PTA metoprolol 25mg BID - holding for now with lower BP with increased UF per  nephrology, resume if becoming more hypertensive  - Continuous telemetry     # Encephalopathy secondary to hypercarbia - resolved  Patient was progressively more lethargic on 2/17 during admission in [facility]. Etiology likely secondary to hypercarbia in context of respiratory failure as patient was noted to have high CO2s concomitant with lethargy. Though receiving oxycodone and fentanyl, lethargy was only partially alleviated by narcan. LFTs and ammonia wnl with low suspicion of hepatic encephalopathy. BUN wnl with low suspicion for uremic encephalopathy. Head CT on 2/18 was negative with low suspicion of acute intracranial pathology. Patient is awake, alert, oriented and following commands since 2/20.        # Right hip fracture s/p ORIF (December 2023)   - PT/OT    # GERD  - PTA PPI    # Hx EBV viremia   # Hypogammaglobulinemia  # Chronic immunosuppression 2/2 lung transplant  Patient has been afebrile and has not had leukocytosis however she is under immunosuppression. Given acute respiratory failure and hx of recurrent pneumonias, she was started on empiric abx for concerns over new pneumonia. RVP and cultures no growth to date. Last day of empirical treatment for HAP.  - EBV 27K (decreased compared to prior)      Lines/tubes/drains:  - CVC RIJ (for TPN, is tunneled line)   - PIV x2  - PEG/J  - HD AV fistula, left arm      Diet:   Regular diet PO  Majority of nutrition through TPN per RD     General Cares/Prophylaxis:    DVT Prophylaxis: Heparin subcutaneous - resuming with stable Hgb  GI Prophylaxis: PPI  Fluids: As per TPN  Code Status: Full        Diet: Renal Diet (dialysis)  parenteral nutrition - ADULT compounded formula CYCLE  parenteral nutrition - ADULT compounded formula CYCLE    DVT Prophylaxis: resume subQ heparin  Dong Catheter: Not present  Fluids: Per TPN and PO  Lines: PRESENT      PICC 03/04/24 Double Lumen Right Basilic TPN. PICC okay to use.-Site Assessment: WDL  [REMOVED] CVC  Single Lumen Right Internal jugular Non - valved (open ended);Tunneled;Power injectable-Site Assessment: WDL  Hemodialysis Vascular Access Arteriovenous graft Superior Arm-Site Assessment: (P) WDL;Thrill present;Bruit present      Cardiac Monitoring: None  Code Status: Full Code      Clinically Significant Risk Factors        # Hypokalemia: Lowest K = 3.3 mmol/L in last 2 days, will replace as needed     # Hypomagnesemia: Lowest Mg = 1.6 mg/dL in last 2 days, will replace as needed   # Hypoalbuminemia: Lowest albumin = 2.6 g/dL at 2/18/2024  5:13 AM, will monitor as appropriate             # Severe Malnutrition: based on nutrition assessment      # Financial/Environmental Concerns: none         Disposition Plan         The patient's care was discussed with the Attending physician Dr. Mendoza, Bedside Nurse, Patient, and Transplant Pulm Consultant(s).    Jass Fishman DDS  [facility] [place] Resident on Medicine Service, [address]  Securely message with Vocera (more info)  Text page via [facility] Paging/Directory   See signed in provider for up to date coverage information  ______________________________________________________________________    Interval History   No acute events overnight, reviewed nursing staff notes. Patient resting comfortably in bed. She endorses slight discomfort with PICC site, controlled with scheduled Tylenol. She denies any headaches this morning. Can only tolerate using BIPAP for half hour increments. Wondering what the plan is today in regards to treatment. Denies any shortness of breath, dyspnea, dysphagia.    Physical Exam   Vital Signs: Temp: 98.8  F (37.1  C) Temp src: Oral BP: (!) 147/59 Pulse: 109   Resp: 18 SpO2: 100 % O2 Device: Nasal cannula Oxygen Delivery: 2 LPM  Weight: 105 lbs 6.08 oz  General: thin, laying in bed comfortably, no acute distress this morning. Very pleasant  HEENT: Normocephalic, bruising on the right face around  right orbit with hematoma and right neck, improved  Neuro: very awake and alert, fully oriented, following commands, answering questions clearly and easily, moving all extremities.  Pulm/Resp: breathing on NC, no increased work of breathing  CV: appears to be well perfused       Medical Decision Making       Please see A&P for additional details of medical decision making.      Data     I have personally reviewed the following data over the past 24 hrs:    4.6  \   9.4 (L)   / 216     139 103 37.4 (H) /  112 (H)   3.7 25 2.47 (H) \     ALT: <5 AST: 14 AP: 86 TBILI: 0.2   ALB: 3.4 (L) TOT PROTEIN: 5.8 (L) LIPASE: N/A       Imaging results reviewed over the past 24 hrs:   No results found for this or any previous visit (from the past 24 hour(s)).

## 2024-03-06 NOTE — PROGRESS NOTES
Pulmonary Medicine  Cystic Fibrosis - Lung Transplant Team  Progress Note  2024     Patient: Sofie Rodriguez  MRN: [id number]  : 1960 (age 63 year old)  Transplant: 2022 (Lung), POD#617  Admission date: 2/10/2024    Assessment & Plan:     Sofie Rodriguez is a 63 year old female with h/o COPD s/p BSLT () with course complicated by post-operative hemidiaphragm palsy, recurrent PNAs, positive DSA, EBV viremia, hypogammaglobulinemia, severe gastroparesis s/p G/J tube placement (22) and pyloric botox (23), GI bleed 2/2 pyloric ulcer, hemobilia s/p ERCP and MRCP, chronic diarrhea, recurrent C diff colitis, ESRD on iHD, recurrent falls with injuries (recent right hip fx s/p ORIF 2023), deconditioning, and FTT.  Admitted 2/10 for initiation of TPN/lipids.  Transferred to ICU  for emergent intubation for hypoxic and hypercapneic respiratory failure with severe respiratory acidosis and encephalopathy.  iHD increased, infectious workup unremarkable.  Extubated .  Continues with persistent and progressive hypercapnia with severe acidosis, returned to ICU - d/t tenuous respiratory status.  Improvement noted with increase in tolerance of NIPPV, now off during the day with stable blood gases.  Discharge pending medical clearance by primary (with TPN/lipids and overnight AVAPS), therapy recommending TCU (pt. hesitant).     Today's recommendations:  - Tacro level ordered for 3/8  - AVAPS use overnight and with any daytime naps, please document tolerance  - Continue morning VBG  - Please collect sputum culture if generates sputum  - Repeat DSA pending  - Repeat CMV, Prospera, and EBV ordered 3/18  - Now on 4x/week schedule (MTTSa) for dialysis given increased fluid intake with TPN     Acute on chronic hypoxic/hypercapneic respiratory failure:  S/p bilateral sequential lung transplant for COPD:  Right hemidiaphragm palsy:   Hypoventilation, Suspected CARLEE: CT PTA () with decreased  MARLON opacities but new tree in bud RLL opacities.  PFTs unchanged in clinic (but ATS criteria not met), remain significantly below her baseline.  Baseline hypoxia with 2L NC overnight.  Respiratory decompensation with encephalopathy and severe respiratory acidosis on 2/17.  S/p intubation 2/17-2/19.  Repeat CT (2/17) with new patchy consolidative and nodular opacities, GGO primarily in the bases and intralobular septal thickening (concerning for pulmonary edema given recent addition of TPN nutrition, new infection, PTLD, and/or septic emboli.  Bronch with lavage of RML (2/18) with very friable tissue, cutlures only with C. glabrata.  S/p empiric Zosyn (2/17-2/24) and micafungin (2/18-2/21).  Severe respiratory acidosis with hypercapnia persisting with slight improvement with NIPPV treatment, limited by pt. tolerance to pressure and mask (claustrophobia).  Persistent respiratory failure and variable encephalopathy also complicated by diaphragmatic weakness, hypoventilation, and deconditioning d/t malnutrition.  Repeat CT (2/26) with diffuse GG and consolidative opacities >BLL and diffuse interlobular septal thickening.  Intermittent tolerance of NIPPV persists, reports some improvement in comfort with transition from BiPAP to AVAPS.  Neurology consulted 2/27.  Procal increased to 1.56 (2/28).  Transferred to ICU 2/28 given tenuous respiratory status and potential need for reintubation.  Improvement noted with continuous NIPPV with minimal interruptions (sodium bicarb also stopped, likely partially contributing), trial off NIPPV during the day started 2/29.  MRI brain (3/1) with moderate leukoaraiosis, no acute intracranial pathology.  CXR (3/5) with decreased left effusion, stable right effusion.   - AVAPS at night and with naps, minimize interruptions as able, please document tolerance  - Daily AM VBG  - Blood cultures (2/28) NGTD  - Sputum cultures ordered if able to collect  - Xopenex BID (2/27)  - Defer ABX at  this time  - Sleep clinic eval indicated as OP     Immunosuppression:  ImmuKnow low at 108 on 1/10.  AZA previously stopped 5/2023 d/t low ImmuKnow assay and EBV viremia.  Repeat ImmuKnow (2/27) low at 88, defer dose adjustment (tacro goal already decreased one day prior).  - Tacrolimus 4 mg qAM / 3.5 mg qPM via G tube (decreased 3/5).  Goal level 6-8 (decreased 2/26 d/t ImmuKnow and concern for infection).  Repeat level 3/8 (ordered).  - Prednisone 5 mg qAM / 2.5 mg qPM     Prophylaxis:   - Dapsone 50 mg q MWF for PJP ppx  - CMV ppx not currently indicated, D+/R+, CMV negative 2/19 (BAL very mildly positive 2/18, likely not clinically significant), repeat CMV ordered 3/18     Positive DSA: PFTs and pulmonary symptoms have remained stable as OP, so AMR treatment deferred given frailty.  Most recent DSA (2/7) with DQB2 mfi 6966 (from 5723 one month prior).  Most recent cell-free DNA Prospera (2/18) mildly elevated at 1.04 (concerning for possible rejection), which was increased from prior level of 0.12 (1/10).  IST increase deferred at that time per Dr. Gong.  S/p IVIG (2/27) for DSA (IgG WNL).  - Repeat DSA pending (3/6)  - Repeat Prospera ordered 3/18      EBV viremia: CT CAP (2/7) without lymphadenopathy.  Most recent level (2/18) improved to 28k from 96k (2/7)  - Repeat EBV ordered 3/18     Other relevant problems being managed by the primary team:      FTT:  Severe protein calorie malnutrition:  Gastroparesis s/p PEG/J, botox, and G-POEM:  SB hypomotility:  Pyloric ulcer:  Chronic nausea and osmotic diarrhea:  SIBO s/p rifaximin:   Recurrent C diff colitis: Chronic osmotic and infectious diarrhea since transplant with recurrent episodes of C diff.  Notable weight loss (40# in a year) d/t diarrhea, GI dysmotility, and intolerance of enteral feeds (PEG/J tube in place), most recently on elemental formula.  Extensive OP eval and f/u with GI.  S/p port placement for TPN and lipids.  Tolerating modest PO intake  (likely absorbing minimal per GI), monitoring for refeeding syndrome.    - PO diet for pleasure only  - TPN/lipids per primary (PICC line in place), anticipate continued need after discharge given GI concerns for negligible enteral nutritional absorption potential  - CT enterography recommended per GI, but unlikely to be able to tolerate the required 1.5 L enteral contrast bolus so deferring for now     ESRD: CT scan (with declining respiratory status) with volume overload secondary to TPN volume, iHD increased from PTA TThSa schedule with unsustained improvement.  Management per nephrology, dialysis via [facility] CVC.    - Transitioned to 4x/week schedule (MTTSa), per Nephrology would likely need this schedule while on TPN     We appreciate the excellent care provided by the [place] [id number] team.  Recommendations communicated via this note and in person.  Will continue to follow along closely, please do not hesitate to call with any questions or concerns.    Patient discussed with Dr. Huff.    Catherine Johnson, DNP, APRN, CNP  Inpatient Nurse Practitioner  Pulmonary CF/Transplant     Subjective & Interval History:     AVAPS overnight with 3 interruptions for BR breaks, left off for ~15 minutes each time per pt.  VBG this morning stable, no HA (compared with yesterday morning with worsening respiratory acidosis associated with HA).  Otherwise on 2L NC during the day.  No new cough or sputum.  HD run yesterday for 3L removal.    Review of Systems:     C: No fever, no chills, no change in weight, no change in appetite  INTEGUMENTARY/SKIN: + Ecchymosis from falls  ENT/MOUTH: No nasal congestion or drainage  RESP: See interval history  CV: No chest pain, no palpitations, + peripheral edema, no orthopnea  GI: + Occ nausea, no vomiting, stable loose stools, no reflux symptoms  : See interval history  MUSCULOSKELETAL: + Shoulder/hip pain  ENDOCRINE: Blood sugars with adequate control  NEURO: No headache, + tingling to  hands/feet (at least several weeks but newly reported by pt.)  PSYCHIATRIC: Mood stable    Physical Exam:     All notes, images, and labs from past 24 hours (at minimum) were reviewed.    Vital signs:  Temp: 98.4  F (36.9  C) Temp src: Axillary BP: (!) 162/59 Pulse: 105   Resp: 16 SpO2: 97 % O2 Device: Nasal cannula Oxygen Delivery: 2 LPM   Weight: 46.9 kg (103 lb 8 oz)  I/O:   Intake/Output Summary (Last 24 hours) at 3/6/2024 1617  Last data filed at 3/6/2024 0644  Gross per 24 hour   Intake 541 ml   Output --   Net 541 ml     Constitutional: Lying in bed, in no apparent distress.   HEENT: Eyes with pink conjunctivae, anicteric.  Oral mucosa moist without lesions.   PULM: Mildly diminished air flow bilaterally.  No crackles, no rhonchi, no wheezes.  Non-labored breathing on RA (but 78% SpO2).  CV: Normal S1 and S2.  RRR.  No murmur, gallop, or rub.  2-3+ BLE edema.   ABD: NABS, soft, nontender, nondistended.  PEG/J tube site not visualized.  MSK: Moves all extremities.  ++ muscle wasting.   NEURO: Awake and conversant.   SKIN: Warm, dry, fragile, diffuse ecchymosis to extremities and right neck/shoulder.   PSYCH: Mood stable.     Data:     LABS    CMP:   Recent Labs   Lab 03/06/24  0631 03/05/24  1629 03/05/24  0607 03/04/24  1733 03/04/24  0713 03/03/24  1609 03/03/24  0551     --  139  --  140  --  138   POTASSIUM 3.7  --  3.3*  --  3.7  --  3.5   CHLORIDE 103  --  102  --  103  --  102   CO2 25  --  27  --  30*  --  30*   ANIONGAP 11  --  10  --  7  --  6*   *  --  135*  --  142*  --  129*   BUN 37.4*  --  44.8*  --  52.0*  --  30.4*   CR 2.47*  --  2.61*  --  3.30*  --  2.13*   GFRESTIMATED 22*  --  20*  --  15*  --  26*   ESTUARDO 9.0  --  8.7*  --  8.8  --  8.3*   MAG 2.0 1.6* 2.7* 2.0 2.6*   < > 2.1   PHOS 4.6* 3.9 3.4 2.8 2.2*   < > 2.0*   PROTTOTAL 5.8*  --  5.5*  --  5.5*  --  5.1*   ALBUMIN 3.4*  --  3.2*  --  3.2*  --  2.9*   BILITOTAL 0.2  --  0.2  --  0.2  --  0.2   ALKPHOS 86  --  78  --  88   "--  79   AST 14  --  14  --  13  --  14   ALT <5  --  5  --  <5  --  <5    < > = values in this interval not displayed.     CBC:   Recent Labs   Lab 03/06/24  0631 03/05/24  0607 03/04/24  0713 03/03/24  0551   WBC 4.6 3.6* 4.9 3.9*   RBC 2.58* 2.44* 2.43* 2.50*   HGB 9.4* 8.7* 8.6* 9.1*   HCT 31.9* 29.8* 29.9* 30.3*   * 122* 123* 121*   MCH 36.4* 35.7* 35.4* 36.4*   MCHC 29.5* 29.2* 28.8* 30.0*   RDW 22.2* 22.5* 22.7* 22.7*    214 212 212       INR:   Recent Labs   Lab 03/04/24  0713 02/29/24  0332   INR 0.99 1.09       Glucose:   Recent Labs   Lab 03/06/24  0631 03/05/24  0607 03/04/24  0713 03/03/24  0551 03/02/24  1138 03/02/24  0623   * 135* 142* 129* 160* 126*       Blood Gas:   Recent Labs   Lab 03/06/24  0631 03/05/24  0610 03/04/24  0713   PHV 7.24* 7.20* 7.17*   PCO2V 71* 80* 89*   PO2V 40 56* 64*   HCO3V 31* 32* 33*   LINDY 2.1 2.3 2.5   O2PER 35 0 3       Culture Data No results for input(s): \"CULT\" in the last 168 hours.    Virology Data:   Lab Results   Component Value Date    FLUAH1 Not Detected 02/18/2024    FLUAH3 Not Detected 02/18/2024    AK8904 Not Detected 02/18/2024    IFLUB Not Detected 02/18/2024    RSVA Not Detected 02/18/2024    RSVB Not Detected 02/18/2024    PIV1 Not Detected 02/18/2024    PIV2 Not Detected 02/18/2024    PIV3 Not Detected 02/18/2024    HMPV Not Detected 02/18/2024       Historical CMV results (last 3 of prior testing):  Lab Results   Component Value Date    CMVQNT Not Detected 02/19/2024    CMVQNT Not Detected 02/07/2024    CMVQNT Not Detected 01/10/2024     Lab Results   Component Value Date    CMVLOG 3.2 07/12/2023    CMVLOG <2.1 04/19/2023    CMVLOG 3.5 01/25/2023       Urine Studies    Recent Labs   Lab Test 02/18/24  0222 05/18/23  0627   URINEPH 7.5* 5.0   NITRITE Negative Negative   LEUKEST Trace* Moderate*   WBCU 66* 21*       Most Recent [place]ze Pulmonary Function Testing (FVC/FEV1 only)  FVC-Pre   Date Value Ref Range Status   02/07/2024 1.19 L "    01/10/2024 1.12 L    08/29/2023 1.48 L    07/25/2023 1.55 L      FVC-%Pred-Pre   Date Value Ref Range Status   02/07/2024 42 %    01/10/2024 39 %    08/29/2023 53 %    07/25/2023 55 %      FEV1-Pre   Date Value Ref Range Status   02/07/2024 1.13 L    01/10/2024 1.10 L    08/29/2023 1.43 L    07/25/2023 1.54 L      FEV1-%Pred-Pre   Date Value Ref Range Status   02/07/2024 51 %    01/10/2024 49 %    08/29/2023 64 %    07/25/2023 69 %        IMAGING    Recent Results (from the past 48 hour(s))   XR Chest 2 Views    Narrative    XR CHEST 2 VIEWS  3/5/2024 1:03 PM     HISTORY:  Interval f/u       COMPARISON:  3/4/2024 CXR    TECHNIQUE: Upright PA and Lateral radiographs of the chest.     FINDINGS:   Postsurgical changes of bilateral lung transportation with intact  clamshell sternotomy wires. Dual lumen right internal jugular central  venous catheter tip terminates in the right atrium. Right upper  extremity PICC line tip terminates in the right atrium as well.    Cardiomediastinal silhouette is within normal limits. Trachea is  midline. Blunting of the bilateral costophrenic angles, left greater  than right. Unchanged perihilar fullness with indistinct pulmonary  vasculature and septal thickening. Prominent convexity at the upper  left mediastinal border in the expected location of an enlarged  pulmonary artery which is confirmed on review of a CT scan of the  chest dictated 2/17/2024. Abnormal positioning of the left major  fissure and lobes of the left allograft.     Upper abdomen appears normal. No acute osseous abnormalities. Presumed  gastrojejunostomy balloon tip and catheters partially visualized.      Impression    IMPRESSION:  1. Stable right-sided pleural effusion with decreased size of the left  pleural effusion with improved aeration of the left lung.  2. Grossly stable interstitial opacification in both lung fields is  consistent with mild pulmonary edema.  3. Pulmonary artery enlargement suggestive of  pulmonary hypertension.    I have personally reviewed the examination and initial interpretation  and I agree with the findings.    ALVINA HARRY MD         SYSTEM ID:  [id number]   IR CVC Tunnel Removal Right    Narrative    PROCEDURE: Tunneled central venous catheter removal    Procedural Personnel  Attending physician(s): Beverly Paul M.D. I, Dr. Manpreet Paul performed the  entirety of this procedure without assistance.    Fellow physician(s): None  Resident physician(s): None  Advanced practice provider(s): None    Pre-procedure diagnosis: Right lower extremity single lumen tunneled  line is no longer needed  Post-procedure diagnosis: Same  Indication: Catheter no longer needed  Additional clinical history: None    Complications: No immediate complications.      Impression    IMPRESSION:    Removal of right-sided tunneled central venous catheter.    Plan:     Please re-consult interventional radiology if new catheter placement  is desired.  _______________________________________________________________    PROCEDURE SUMMARY:  - Tunneled central venous catheter removal  - Additional procedure(s): None    PROCEDURE DETAILS:    Pre-procedure  Consent: Informed consent for the procedure including risks, benefits  and alternatives was obtained and time-out was performed prior to the  procedure.  Preparation: The site was prepared and draped using maximal sterile  barrier technique including cutaneous antisepsis.    Anesthesia/sedation  Level of anesthesia/sedation: No sedation    Catheter removal  Local anesthesia was administered. The catheter was removed with a  combination of traction and blunt dissection. Fluoroscopic images were  not obtained to document removal.    Closure  Hemostasis was achieved with manual compression. Sterile dressing(s)  applied.    Additional Details  Additional description of procedure: None  Device used: None  Equipment details: None  Specimens removed:  Tunneled central venous catheter.   Estimated blood loss (mL): Less than 10  Standardized report: [email or web address]    Attestation  Signer name: MARK SOSA  I attest that I was present for the entire procedure. I reviewed the  stored images and agree with the report as written.    MARK SOSA         SYSTEM ID:  [id number]

## 2024-03-06 NOTE — PLAN OF CARE
Goal Outcome Evaluation:      Plan of Care Reviewed With: patient    Overall Patient Progress: no change    Outcome Evaluation: Pt A&O. VSS on 1-2L O2 NC. Denies pain. Up w/ A1. R CVC removed in IR, dressing CDI. Fair appetite. Meds given through J. G clamped. Having loose stools. Imodium given x1. Oliguric. Able to make needs known. Will cont to monitor.

## 2024-03-07 ENCOUNTER — APPOINTMENT (OUTPATIENT)
Dept: PHYSICAL THERAPY | Facility: CLINIC | Age: 64
DRG: 640 | End: 2024-03-07
Attending: INTERNAL MEDICINE
Payer: MEDICARE

## 2024-03-07 ENCOUNTER — APPOINTMENT (OUTPATIENT)
Dept: CT IMAGING | Facility: CLINIC | Age: 64
DRG: 640 | End: 2024-03-07
Attending: NURSE PRACTITIONER
Payer: MEDICARE

## 2024-03-07 LAB
ALBUMIN SERPL BCG-MCNC: 3.4 G/DL (ref 3.5–5.2)
ALLEN'S TEST: YES
ALP SERPL-CCNC: 91 U/L (ref 40–150)
ALT SERPL W P-5'-P-CCNC: <5 U/L (ref 0–50)
ANION GAP SERPL CALCULATED.3IONS-SCNC: 11 MMOL/L (ref 7–15)
AST SERPL W P-5'-P-CCNC: 15 U/L (ref 0–45)
BASE EXCESS BLDA CALC-SCNC: 2.1 MMOL/L (ref -3–3)
BASE EXCESS BLDV CALC-SCNC: -0.1 MMOL/L (ref -3–3)
BASOPHILS # BLD AUTO: ABNORMAL 10*3/UL
BASOPHILS # BLD MANUAL: 0.1 10E3/UL (ref 0–0.2)
BASOPHILS NFR BLD AUTO: ABNORMAL %
BASOPHILS NFR BLD MANUAL: 1 %
BILIRUB SERPL-MCNC: 0.2 MG/DL
BUN SERPL-MCNC: 65.4 MG/DL (ref 8–23)
CALCIUM SERPL-MCNC: 9.3 MG/DL (ref 8.8–10.2)
CHLORIDE SERPL-SCNC: 102 MMOL/L (ref 98–107)
COHGB MFR BLD: 97.9 % (ref 96–97)
CREAT SERPL-MCNC: 3.41 MG/DL (ref 0.51–0.95)
DEPRECATED HCO3 PLAS-SCNC: 26 MMOL/L (ref 22–29)
EGFRCR SERPLBLD CKD-EPI 2021: 15 ML/MIN/1.73M2
EOSINOPHIL # BLD AUTO: ABNORMAL 10*3/UL
EOSINOPHIL # BLD MANUAL: 0.2 10E3/UL (ref 0–0.7)
EOSINOPHIL NFR BLD AUTO: ABNORMAL %
EOSINOPHIL NFR BLD MANUAL: 3 %
ERYTHROCYTE [DISTWIDTH] IN BLOOD BY AUTOMATED COUNT: 21.9 % (ref 10–15)
GLUCOSE BLDC GLUCOMTR-MCNC: 132 MG/DL (ref 70–99)
GLUCOSE SERPL-MCNC: 113 MG/DL (ref 70–99)
HCO3 BLD-SCNC: 31 MMOL/L (ref 21–28)
HCO3 BLDV-SCNC: 30 MMOL/L (ref 21–28)
HCT VFR BLD AUTO: 31.3 % (ref 35–47)
HGB BLD-MCNC: 9 G/DL (ref 11.7–15.7)
IMM GRANULOCYTES # BLD: ABNORMAL 10*3/UL
IMM GRANULOCYTES NFR BLD: ABNORMAL %
LYMPHOCYTES # BLD AUTO: ABNORMAL 10*3/UL
LYMPHOCYTES # BLD MANUAL: 0.6 10E3/UL (ref 0.8–5.3)
LYMPHOCYTES NFR BLD AUTO: ABNORMAL %
LYMPHOCYTES NFR BLD MANUAL: 11 %
MAGNESIUM SERPL-MCNC: 1.7 MG/DL (ref 1.7–2.3)
MAGNESIUM SERPL-MCNC: 2.3 MG/DL (ref 1.7–2.3)
MCH RBC QN AUTO: 35.7 PG (ref 26.5–33)
MCHC RBC AUTO-ENTMCNC: 28.8 G/DL (ref 31.5–36.5)
MCV RBC AUTO: 124 FL (ref 78–100)
MONOCYTES # BLD AUTO: ABNORMAL 10*3/UL
MONOCYTES # BLD MANUAL: 0.7 10E3/UL (ref 0–1.3)
MONOCYTES NFR BLD AUTO: ABNORMAL %
MONOCYTES NFR BLD MANUAL: 13 %
NEUTROPHILS # BLD AUTO: ABNORMAL 10*3/UL
NEUTROPHILS # BLD MANUAL: 3.7 10E3/UL (ref 1.6–8.3)
NEUTROPHILS NFR BLD AUTO: ABNORMAL %
NEUTROPHILS NFR BLD MANUAL: 72 %
NRBC # BLD AUTO: 0 10E3/UL
NRBC # BLD AUTO: 0.1 10E3/UL
NRBC BLD AUTO-RTO: 0 /100
NRBC BLD MANUAL-RTO: 1 %
O2/TOTAL GAS SETTING VFR VENT: 1 %
O2/TOTAL GAS SETTING VFR VENT: 21 %
OXYHGB MFR BLDV: 84 % (ref 70–75)
PCO2 BLD: 70 MM HG (ref 35–45)
PCO2 BLDV: 82 MM HG (ref 40–50)
PH BLD: 7.25 [PH] (ref 7.35–7.45)
PH BLDV: 7.17 [PH] (ref 7.32–7.43)
PHOSPHATE SERPL-MCNC: 2.7 MG/DL (ref 2.5–4.5)
PHOSPHATE SERPL-MCNC: 5.1 MG/DL (ref 2.5–4.5)
PLAT MORPH BLD: ABNORMAL
PLATELET # BLD AUTO: 211 10E3/UL (ref 150–450)
PO2 BLD: 117 MM HG (ref 80–105)
PO2 BLDV: 59 MM HG (ref 25–47)
POTASSIUM SERPL-SCNC: 4 MMOL/L (ref 3.4–5.3)
PROT SERPL-MCNC: 5.8 G/DL (ref 6.4–8.3)
RBC # BLD AUTO: 2.52 10E6/UL (ref 3.8–5.2)
RBC MORPH BLD: ABNORMAL
SAO2 % BLDA: 96 % (ref 92–100)
SAO2 % BLDV: 86.5 % (ref 70–75)
SODIUM SERPL-SCNC: 139 MMOL/L (ref 135–145)
T3 SERPL-MCNC: 47 NG/DL (ref 85–202)
TSH SERPL DL<=0.005 MIU/L-ACNC: 2.24 UIU/ML (ref 0.3–4.2)
WBC # BLD AUTO: 5.2 10E3/UL (ref 4–11)

## 2024-03-07 PROCEDURE — 99233 SBSQ HOSP IP/OBS HIGH 50: CPT | Mod: GC | Performed by: STUDENT IN AN ORGANIZED HEALTH CARE EDUCATION/TRAINING PROGRAM

## 2024-03-07 PROCEDURE — 250N000009 HC RX 250

## 2024-03-07 PROCEDURE — 84100 ASSAY OF PHOSPHORUS: CPT

## 2024-03-07 PROCEDURE — 94640 AIRWAY INHALATION TREATMENT: CPT | Mod: 76

## 2024-03-07 PROCEDURE — 36592 COLLECT BLOOD FROM PICC: CPT

## 2024-03-07 PROCEDURE — 97530 THERAPEUTIC ACTIVITIES: CPT | Mod: GP

## 2024-03-07 PROCEDURE — 82805 BLOOD GASES W/O2 SATURATION: CPT | Performed by: NURSE PRACTITIONER

## 2024-03-07 PROCEDURE — 258N000003 HC RX IP 258 OP 636: Performed by: STUDENT IN AN ORGANIZED HEALTH CARE EDUCATION/TRAINING PROGRAM

## 2024-03-07 PROCEDURE — 83735 ASSAY OF MAGNESIUM: CPT

## 2024-03-07 PROCEDURE — 250N000013 HC RX MED GY IP 250 OP 250 PS 637

## 2024-03-07 PROCEDURE — 250N000009 HC RX 250: Performed by: STUDENT IN AN ORGANIZED HEALTH CARE EDUCATION/TRAINING PROGRAM

## 2024-03-07 PROCEDURE — 250N000009 HC RX 250: Performed by: INTERNAL MEDICINE

## 2024-03-07 PROCEDURE — 71275 CT ANGIOGRAPHY CHEST: CPT | Mod: 26 | Performed by: RADIOLOGY

## 2024-03-07 PROCEDURE — 84480 ASSAY TRIIODOTHYRONINE (T3): CPT

## 2024-03-07 PROCEDURE — 250N000012 HC RX MED GY IP 250 OP 636 PS 637: Performed by: STUDENT IN AN ORGANIZED HEALTH CARE EDUCATION/TRAINING PROGRAM

## 2024-03-07 PROCEDURE — 85007 BL SMEAR W/DIFF WBC COUNT: CPT

## 2024-03-07 PROCEDURE — 99233 SBSQ HOSP IP/OBS HIGH 50: CPT | Mod: 24 | Performed by: INTERNAL MEDICINE

## 2024-03-07 PROCEDURE — 120N000002 HC R&B MED SURG/OB UMMC

## 2024-03-07 PROCEDURE — 90937 HEMODIALYSIS REPEATED EVAL: CPT

## 2024-03-07 PROCEDURE — 99232 SBSQ HOSP IP/OBS MODERATE 35: CPT | Mod: FS | Performed by: NURSE PRACTITIONER

## 2024-03-07 PROCEDURE — G1010 CDSM STANSON: HCPCS | Performed by: RADIOLOGY

## 2024-03-07 PROCEDURE — 80053 COMPREHEN METABOLIC PANEL: CPT

## 2024-03-07 PROCEDURE — 999N000157 HC STATISTIC RCP TIME EA 10 MIN

## 2024-03-07 PROCEDURE — 250N000011 HC RX IP 250 OP 636: Performed by: STUDENT IN AN ORGANIZED HEALTH CARE EDUCATION/TRAINING PROGRAM

## 2024-03-07 PROCEDURE — 250N000011 HC RX IP 250 OP 636

## 2024-03-07 PROCEDURE — 36415 COLL VENOUS BLD VENIPUNCTURE: CPT

## 2024-03-07 PROCEDURE — G1010 CDSM STANSON: HCPCS

## 2024-03-07 PROCEDURE — 82805 BLOOD GASES W/O2 SATURATION: CPT

## 2024-03-07 PROCEDURE — 97116 GAIT TRAINING THERAPY: CPT | Mod: GP

## 2024-03-07 PROCEDURE — 634N000001 HC RX 634: Mod: JZ | Performed by: STUDENT IN AN ORGANIZED HEALTH CARE EDUCATION/TRAINING PROGRAM

## 2024-03-07 PROCEDURE — 36600 WITHDRAWAL OF ARTERIAL BLOOD: CPT

## 2024-03-07 PROCEDURE — 85027 COMPLETE CBC AUTOMATED: CPT

## 2024-03-07 PROCEDURE — 99207 PR NO BILLABLE SERVICE THIS VISIT: CPT | Performed by: NURSE PRACTITIONER

## 2024-03-07 PROCEDURE — 250N000012 HC RX MED GY IP 250 OP 636 PS 637

## 2024-03-07 PROCEDURE — 84443 ASSAY THYROID STIM HORMONE: CPT

## 2024-03-07 PROCEDURE — 99207 PR NO BILLABLE SERVICE THIS VISIT: CPT | Performed by: INTERNAL MEDICINE

## 2024-03-07 RX ORDER — IOPAMIDOL 755 MG/ML
90 INJECTION, SOLUTION INTRAVASCULAR ONCE
Status: COMPLETED | OUTPATIENT
Start: 2024-03-07 | End: 2024-03-07

## 2024-03-07 RX ADMIN — IOPAMIDOL 90 ML: 755 INJECTION, SOLUTION INTRAVENOUS at 14:55

## 2024-03-07 RX ADMIN — Medication 40 MG: at 19:51

## 2024-03-07 RX ADMIN — Medication: at 07:43

## 2024-03-07 RX ADMIN — LIDOCAINE: 40 CREAM TOPICAL at 06:57

## 2024-03-07 RX ADMIN — SODIUM CHLORIDE 250 ML: 9 INJECTION, SOLUTION INTRAVENOUS at 07:43

## 2024-03-07 RX ADMIN — EPOETIN ALFA-EPBX 8000 UNITS: 10000 INJECTION, SOLUTION INTRAVENOUS; SUBCUTANEOUS at 08:02

## 2024-03-07 RX ADMIN — ACETAMINOPHEN 975 MG: 325 TABLET, FILM COATED ORAL at 19:52

## 2024-03-07 RX ADMIN — HEPARIN SODIUM 5000 UNITS: 5000 INJECTION, SOLUTION INTRAVENOUS; SUBCUTANEOUS at 06:59

## 2024-03-07 RX ADMIN — CALCIUM CARBONATE 600 MG (1,500 MG)-VITAMIN D3 400 UNIT TABLET 1 TABLET: at 11:33

## 2024-03-07 RX ADMIN — TACROLIMUS 3.5 MG: 5 CAPSULE ORAL at 18:00

## 2024-03-07 RX ADMIN — LEVOTHYROXINE SODIUM 25 MCG: 0.03 TABLET ORAL at 06:58

## 2024-03-07 RX ADMIN — PREDNISONE 5 MG: 5 TABLET ORAL at 11:33

## 2024-03-07 RX ADMIN — PREDNISONE 2.5 MG: 2.5 TABLET ORAL at 19:52

## 2024-03-07 RX ADMIN — LOPERAMIDE HYDROCHLORIDE 2 MG: 2 CAPSULE ORAL at 19:52

## 2024-03-07 RX ADMIN — SMOFLIPID 250 ML: 6; 6; 5; 3 INJECTION, EMULSION INTRAVENOUS at 20:41

## 2024-03-07 RX ADMIN — TACROLIMUS 4 MG: 5 CAPSULE ORAL at 11:33

## 2024-03-07 RX ADMIN — Medication 2.5 MCG: at 11:32

## 2024-03-07 RX ADMIN — Medication 40 MG: at 11:33

## 2024-03-07 RX ADMIN — Medication 2.5 MCG: at 19:51

## 2024-03-07 RX ADMIN — HEPARIN SODIUM 5000 UNITS: 5000 INJECTION, SOLUTION INTRAVENOUS; SUBCUTANEOUS at 18:00

## 2024-03-07 RX ADMIN — LEVALBUTEROL HYDROCHLORIDE 1.25 MG: 1.25 SOLUTION RESPIRATORY (INHALATION) at 20:26

## 2024-03-07 RX ADMIN — LIDOCAINE 4% 1 PATCH: 40 PATCH TOPICAL at 19:50

## 2024-03-07 RX ADMIN — CYANOCOBALAMIN TAB 500 MCG 500 MCG: 500 TAB at 11:33

## 2024-03-07 RX ADMIN — LOPERAMIDE HYDROCHLORIDE 2 MG: 2 CAPSULE ORAL at 11:36

## 2024-03-07 RX ADMIN — SODIUM CHLORIDE 300 ML: 9 INJECTION, SOLUTION INTRAVENOUS at 07:43

## 2024-03-07 RX ADMIN — CALCIUM CARBONATE 600 MG (1,500 MG)-VITAMIN D3 400 UNIT TABLET 1 TABLET: at 18:00

## 2024-03-07 RX ADMIN — MAGNESIUM SULFATE HEPTAHYDRATE: 500 INJECTION, SOLUTION INTRAMUSCULAR; INTRAVENOUS at 20:40

## 2024-03-07 ASSESSMENT — ACTIVITIES OF DAILY LIVING (ADL)
ADLS_ACUITY_SCORE: 36

## 2024-03-07 NOTE — PROGRESS NOTES
Pulmonary Medicine  Cystic Fibrosis - Lung Transplant Team  Progress Note  2024     Patient: Sofie Rodriguez  MRN: [id number]  : 1960 (age 63 year old)  Transplant: 2022 (Lung), POD#[id number]  Admission date: 2/10/2024    Assessment & Plan:     Sofie Rodriguez is a 63 year old female with h/o COPD s/p BSLT () with course complicated by post-operative hemidiaphragm palsy, recurrent PNAs, positive DSA, EBV viremia, hypogammaglobulinemia, severe gastroparesis s/p G/J tube placement (22) and pyloric botox (23), GI bleed 2/2 pyloric ulcer, hemobilia s/p ERCP and MRCP, chronic diarrhea, recurrent C diff colitis, ESRD on iHD, recurrent falls with injuries (recent right hip fx s/p ORIF 2023), deconditioning, and FTT.  Admitted 2/10 for initiation of TPN/lipids.  Transferred to ICU  for emergent intubation for hypoxic and hypercapneic respiratory failure with severe respiratory acidosis and encephalopathy.  iHD increased, infectious workup unremarkable.  Extubated .  Continues with persistent and progressive hypercapnia with severe acidosis, returned to ICU - d/t tenuous respiratory status.  Improvement noted with increase in tolerance of NIPPV, now off during the day with stable blood gases.  Discharge pending medical stability (with TPN/lipids and overnight AVAPS), therapy recommending TCU (pt. hesitant).     Today's recommendations:  - AVAPS at night and with naps, minimize interruptions as able, please document tolerance  - Daily AM VBG, ABG ordered today to assess gradient  - Sputum cultures ordered if able to collect  - CTA today (including PE evaluation) for further identification of persistent refractory hypercapnia (ordered, discussed with radiology)  - Consider SNIFF test if CTA is not revealing  - Tacro level ordered for 3/8  - Repeat DSA pending  - Repeat CMV, Prospera, and EBV ordered 3/18  - TPN/lipids per primary (PICC line in place), anticipate continued need  after discharge given GI concerns for negligible enteral nutritional absorption potential  - iHD 4x/week schedule (MTTSa), per nephrology would likely need this schedule while on TPN given volume reaccumulation     Acute on chronic hypoxic/hypercapneic respiratory failure:  S/p bilateral sequential lung transplant for COPD:  Right hemidiaphragm palsy:   Hypoventilation, Suspected CARLEE: CT PTA (2/7) with decreased MARLON opacities but new tree in bud RLL opacities.  PFTs unchanged in clinic (but ATS criteria not met), remain significantly below her baseline.  Baseline hypoxia with 2L NC overnight.  Respiratory decompensation with encephalopathy and severe respiratory acidosis on 2/17.  S/p intubation 2/17-2/19.  Repeat CT (2/17) with new patchy consolidative and nodular opacities, GGO primarily in the bases and intralobular septal thickening (concerning for pulmonary edema given recent addition of TPN nutrition, new infection, PTLD, and/or septic emboli.  Bronch with lavage of RML (2/18) with very friable tissue, cutlures only with C. glabrata.  S/p empiric Zosyn (2/17-2/24) and micafungin (2/18-2/21).  Severe respiratory acidosis with hypercapnia persisting with slight improvement with NIPPV treatment, limited by pt. tolerance to pressure and mask (claustrophobia).  Persistent respiratory failure and variable encephalopathy also complicated by diaphragmatic weakness, hypoventilation, and deconditioning d/t malnutrition.  Repeat CT (2/26) with diffuse GG and consolidative opacities >BLL and diffuse interlobular septal thickening.  Intermittent tolerance of NIPPV persists, reports some improvement in comfort with transition from BiPAP to AVAPS.  Neurology consulted 2/27.  Procal increased to 1.56 (2/28).  Transferred to ICU 2/28 given tenuous respiratory status and potential need for reintubation.  Improvement noted with continuous NIPPV with minimal interruptions (sodium bicarb also stopped, likely partially  contributing), trial off NIPPV during the day started 2/29.  MRI brain (3/1) with moderate leukoaraiosis, no acute intracranial pathology.  CXR (3/5) with decreased left effusion, stable right effusion.   - AVAPS at night and with naps, minimize interruptions as able, please document tolerance  - Daily AM VBG  - ABG ordered today to assess gradient  - Blood cultures (2/28) NGTD  - Sputum cultures ordered if able to collect  - Xopenex BID (2/27)  - Defer ABX at this time  - CTA today (including PE evaluation) for further identification of persistent refractory hypercapnia (ordered, discussed with radiology)  - Consider SNIFF test if CTA is not revealing  - Sleep clinic eval indicated as OP     Immunosuppression:  ImmuKnow low at 108 on 1/10.  AZA previously stopped 5/2023 d/t low ImmuKnow assay and EBV viremia.  Repeat ImmuKnow (2/27) low at 88, defer dose adjustment (tacro goal already decreased one day prior).  - Tacrolimus 4 mg qAM / 3.5 mg qPM via G tube (decreased 3/5).  Goal level 6-8 (decreased 2/26 d/t ImmuKnow and concern for infection).  Repeat level 3/8 (ordered).  - Prednisone 5 mg qAM / 2.5 mg qPM     Prophylaxis:   - Dapsone 50 mg q MWF for PJP ppx  - CMV ppx not currently indicated, D+/R+, CMV negative 2/19 (BAL very mildly positive 2/18, likely not clinically significant), repeat CMV ordered 3/18     Positive DSA: PFTs and pulmonary symptoms have remained stable as OP, so AMR treatment deferred given frailty.  Most recent DSA (2/7) with DQB2 mfi 6966 (from 5723 one month prior).  Most recent cell-free DNA Prospera (2/18) mildly elevated at 1.04 (concerning for possible rejection), which was increased from prior level of 0.12 (1/10).  IST increase deferred at that time per Dr. Gong.  S/p IVIG (2/27) for DSA (IgG WNL).  - Repeat DSA pending (3/6)  - Repeat Prospera ordered 3/18      EBV viremia: CT CAP (2/7) without lymphadenopathy.  Most recent level (2/18) improved to 28k from 96k (2/7)  - Repeat EBV  ordered 3/18     Other relevant problems being managed by the primary team:      FTT:  Severe protein calorie malnutrition:  Gastroparesis s/p PEG/J, botox, and G-POEM:  SB hypomotility:  Pyloric ulcer:  Chronic nausea and osmotic diarrhea:  SIBO s/p rifaximin:   Recurrent C diff colitis: Chronic osmotic and infectious diarrhea since transplant with recurrent episodes of C diff.  Notable weight loss (40# in a year) d/t diarrhea, GI dysmotility, and intolerance of enteral feeds (PEG/J tube in place), most recently on elemental formula.  Extensive OP eval and f/u with GI.  S/p port placement for TPN and lipids.  Tolerating modest PO intake (likely absorbing minimal per GI), monitoring for refeeding syndrome.    - PO diet for pleasure only  - TPN/lipids per primary (PICC line in place), anticipate continued need after discharge given GI concerns for negligible enteral nutritional absorption potential  - CT enterography recommended per GI, but unlikely to be able to tolerate the required 1.5 L enteral contrast bolus so deferring for now     ESRD: CT scan (with declining respiratory status) with volume overload secondary to TPN volume, iHD increased from PTA TThSa schedule with unsustained improvement.  Management per nephrology, dialysis via [facility].    - iHD 4x/week schedule (MTTSa), per nephrology would likely need this schedule while on TPN given volume reaccumulation     We appreciate the excellent care provided by the [place] [id number] team.  Recommendations communicated via this note and in person.  Will continue to follow along closely, please do not hesitate to call with any questions or concerns.     Patient discussed with Dr. Huff.    Catherine Johnson, DNP, APRN, CNP  Inpatient Nurse Practitioner  Pulmonary CF/Transplant     Subjective & Interval History:     Remains on 1-2L NC while awake.  NIPPV overnight with sleep and with nights with variable tolerance, used fairly consistently the night prior but less  consistently last night with resultant worsening of VBG and recurrence of HA.  No new cough or sputum.    Review of Systems:     C: No fever, no chills, no change in weight, no change in appetite  INTEGUMENTARY/SKIN: + Ecchymosis from falls  ENT/MOUTH: No nasal congestion or drainage  RESP: See interval history  CV: No chest pain, no palpitations, + peripheral edema, no orthopnea  GI: + Occ nausea, no vomiting, stable loose stools, no reflux symptoms  : See interval history  MUSCULOSKELETAL: + Shoulder/hip pain  ENDOCRINE: Blood sugars with adequate control  NEURO: No headache, + tingling to hands/feet (at least several weeks but newly reported by pt.)  PSYCHIATRIC: Mood stable    Physical Exam:     All notes, images, and labs from past 24 hours (at minimum) were reviewed.    Vital signs:  Temp: 98.2  F (36.8  C) Temp src: Oral BP: 127/62 Pulse: 97   Resp: 20 SpO2: 98 % O2 Device: Nasal cannula Oxygen Delivery: 1 LPM   Weight: 45.5 kg (100 lb 5 oz)  I/O:   Intake/Output Summary (Last 24 hours) at 3/7/2024 1330  Last data filed at 3/7/2024 1045  Gross per 24 hour   Intake 558.57 ml   Output 3000 ml   Net -2441.43 ml     Constitutional: Lying in bed in dialysis, in no apparent distress.   HEENT: Eyes with pink conjunctivae, anicteric.  Oral mucosa moist without lesions.   PULM: Mildly diminished air flow bilaterally.  No crackles, no rhonchi, no wheezes.  Non-labored breathing on 1L NC.  CV: Tachycardia.  No murmur, gallop, or rub.  2-3+ BLE edema.   ABD: NABS, soft, nontender, nondistended.  PEG/J tube site cdi.  MSK: Moves all extremities.  ++ muscle wasting.   NEURO: Sleeping.   SKIN: Warm, dry, diffuse ecchymosis to extremities and right neck.   PSYCH: Mood stable.     Data:     LABS    CMP:   Recent Labs   Lab 03/07/24  0830 03/07/24  0550 03/06/24  1623 03/06/24  0631 03/05/24  1629 03/05/24  0607 03/04/24  1733 03/04/24  0713   NA  --  139  --  139  --  139  --  140   POTASSIUM  --  4.0  --  3.7  --  3.3*  --  " 3.7   CHLORIDE  --  102  --  103  --  102  --  103   CO2  --  26  --  25  --  27  --  30*   ANIONGAP  --  11  --  11  --  10  --  7   * 113*  --  112*  --  135*  --  142*   BUN  --  65.4*  --  37.4*  --  44.8*  --  52.0*   CR  --  3.41*  --  2.47*  --  2.61*  --  3.30*   GFRESTIMATED  --  15*  --  22*  --  20*  --  15*   ESTUARDO  --  9.3  --  9.0  --  8.7*  --  8.8   MAG  --  2.3 2.1 2.0 1.6* 2.7*   < > 2.6*   PHOS  --  5.1* 4.6* 4.6* 3.9 3.4   < > 2.2*   PROTTOTAL  --  5.8*  --  5.8*  --  5.5*  --  5.5*   ALBUMIN  --  3.4*  --  3.4*  --  3.2*  --  3.2*   BILITOTAL  --  0.2  --  0.2  --  0.2  --  0.2   ALKPHOS  --  91  --  86  --  78  --  88   AST  --  15  --  14  --  14  --  13   ALT  --  <5  --  <5  --  5  --  <5    < > = values in this interval not displayed.     CBC:   Recent Labs   Lab 03/07/24  0550 03/06/24  0631 03/05/24  0607 03/04/24  0713   WBC 5.2 4.6 3.6* 4.9   RBC 2.52* 2.58* 2.44* 2.43*   HGB 9.0* 9.4* 8.7* 8.6*   HCT 31.3* 31.9* 29.8* 29.9*   * 124* 122* 123*   MCH 35.7* 36.4* 35.7* 35.4*   MCHC 28.8* 29.5* 29.2* 28.8*   RDW 21.9* 22.2* 22.5* 22.7*    216 214 212       INR:   Recent Labs   Lab 03/04/24  0713   INR 0.99       Glucose:   Recent Labs   Lab 03/07/24  0830 03/07/24  0550 03/06/24  0631 03/05/24  0607 03/04/24  0713 03/03/24  0551   * 113* 112* 135* 142* 129*       Blood Gas:   Recent Labs   Lab 03/07/24  0550 03/06/24  0631 03/05/24  0610   PHV 7.17* 7.24* 7.20*   PCO2V 82* 71* 80*   PO2V 59* 40 56*   HCO3V 30* 31* 32*   LINDY -0.1 2.1 2.3   O2PER 21 35 0       Culture Data No results for input(s): \"CULT\" in the last 168 hours.    Virology Data:   Lab Results   Component Value Date    FLUAH1 Not Detected 02/18/2024    FLUAH3 Not Detected 02/18/2024    FW0041 Not Detected 02/18/2024    IFLUB Not Detected 02/18/2024    RSVA Not Detected 02/18/2024    RSVB Not Detected 02/18/2024    PIV1 Not Detected 02/18/2024    PIV2 Not Detected 02/18/2024    PIV3 Not Detected " 02/18/2024    HMPV Not Detected 02/18/2024       Historical CMV results (last 3 of prior testing):  Lab Results   Component Value Date    CMVQNT Not Detected 02/19/2024    CMVQNT Not Detected 02/07/2024    CMVQNT Not Detected 01/10/2024     Lab Results   Component Value Date    CMVLOG 3.2 07/12/2023    CMVLOG <2.1 04/19/2023    CMVLOG 3.5 01/25/2023       Urine Studies    Recent Labs   Lab Test 02/18/24  0222 05/18/23  0627   URINEPH 7.5* 5.0   NITRITE Negative Negative   LEUKEST Trace* Moderate*   WBCU 66* 21*       Most Recent [place] Pulmonary Function Testing (FVC/FEV1 only)  FVC-Pre   Date Value Ref Range Status   02/07/2024 1.19 L    01/10/2024 1.12 L    08/29/2023 1.48 L    07/25/2023 1.55 L      FVC-%Pred-Pre   Date Value Ref Range Status   02/07/2024 42 %    01/10/2024 39 %    08/29/2023 53 %    07/25/2023 55 %      FEV1-Pre   Date Value Ref Range Status   02/07/2024 1.13 L    01/10/2024 1.10 L    08/29/2023 1.43 L    07/25/2023 1.54 L      FEV1-%Pred-Pre   Date Value Ref Range Status   02/07/2024 51 %    01/10/2024 49 %    08/29/2023 64 %    07/25/2023 69 %        IMAGING    Recent Results (from the past 48 hour(s))   IR CVC Tunnel Removal Right    Narrative    PROCEDURE: Tunneled central venous catheter removal    Procedural Personnel  Attending physician(s): Beverly Paul M.D. I, Dr. Manpreet Paul performed the  entirety of this procedure without assistance.    Fellow physician(s): None  Resident physician(s): None  Advanced practice provider(s): None    Pre-procedure diagnosis: Right lower extremity single lumen tunneled  line is no longer needed  Post-procedure diagnosis: Same  Indication: Catheter no longer needed  Additional clinical history: None    Complications: No immediate complications.      Impression    IMPRESSION:    Removal of right-sided tunneled central venous catheter.    Plan:     Please re-consult interventional radiology if new catheter placement  is  desired.  _______________________________________________________________    PROCEDURE SUMMARY:  - Tunneled central venous catheter removal  - Additional procedure(s): None    PROCEDURE DETAILS:    Pre-procedure  Consent: Informed consent for the procedure including risks, benefits  and alternatives was obtained and time-out was performed prior to the  procedure.  Preparation: The site was prepared and draped using maximal sterile  barrier technique including cutaneous antisepsis.    Anesthesia/sedation  Level of anesthesia/sedation: No sedation    Catheter removal  Local anesthesia was administered. The catheter was removed with a  combination of traction and blunt dissection. Fluoroscopic images were  not obtained to document removal.    Closure  Hemostasis was achieved with manual compression. Sterile dressing(s)  applied.    Additional Details  Additional description of procedure: None  Device used: None  Equipment details: None  Specimens removed: Tunneled central venous catheter.   Estimated blood loss (mL): Less than 10  Standardized report: [email or web address]    Attestation  Signer name: MARK SOSA  I attest that I was present for the entire procedure. I reviewed the  stored images and agree with the report as written.    MARK SOSA         SYSTEM ID:  [id number]

## 2024-03-07 NOTE — PLAN OF CARE
Goal Outcome Evaluation:      Plan of Care Reviewed With: patient    Overall Patient Progress: no changeOverall Patient Progress: no change    Outcome Evaluation: Pt A&O x4. VSS on NC and BiPAP. Pt an assist of 1. Meds given through J tube. Pt had moderate hip pain, given tylenol. G clamped. Started on TPN and lipids. Oliguric. BiPAP not well tolerated, noted in flowsheets when worn and when removed. Able to make needs known.

## 2024-03-07 NOTE — PROGRESS NOTES
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 On 2/17/24 she was transferred to ICU for worsening mental status and acute hypoxic and hypercarbic respiratory failure inspite of BiPAP requiring intubation. She was suspected to have PNA and started on antibiotics. Extubated on 2/19 without complications and tolerated iHD on 2/20, and tolerated PO regular diet in addition to TPN. Developed progressively worsening respiratory acidosis and hypercarbia, and was re-admitted to ICU on 2/28/24 for close monitoring of intermittent BiPAP and possible intubation, however she improved on AVAPS setting and well enough to transfer back to medicine floor on 2/29/24. Currently working on balancing volume status with current TPN plan. RT continue to assess and document details of AVAPS to determine if patient is being underventilated. Tunneled CVC removed on 3/7 without complications.    Changes today:   - Encouraged to continue Bipap while sleeping (and with naps). If mental status worsens, will use Bipap during the day too as it may indicate worsening hypercarbia   - 3L O2 for  meals and breaks from Bipap  - R-PICC placed on 3/4.   - IR removed tunneled CVC on 3/6 without complications  - HD today  - trending VBGs daily   - Tacro dose adjusted for goal of 6-8  - RT continue to document TV and exact timing of when Bipap on and off to see if settings need to be changed as the gas has not improved.   - Contacted lung transplant social work team ([facility]) in regards to patient progression. Pulmonary team concerned she is not stable for discharge at this time due to trending BG CO2 levels. Along with coordination to find a facility that can work with her in regards to BIPAP, Dialysis and TPN.    #Severe respiratory acidosis, improved on BiPAP  #Acute hypoxic and hypercarbic respiratory failure  #S/P Lung transplant 2022 c/b right hemidiaphragm palsy  #Recurrent pneumonia, resolved  Patient received a bilateral lung transplant 6/28/22 for COPD. Was admitted 2/10 to address malnutrition and admitted to ICU on 2/17 with hypoxic hypercarbic respiratory failure. Intubated on 2/18 AM  due to worsening oxygen requirements, likely due to pulmonary edema given hx of ESRD requiring HD vs infection given hx of lung transplant, immunosuppressed status, and recurrent pneumonias. Extubated on 2/19 without complications, Micro Follow-up on BAL, viral panel, CMV, fungus, and Blood Cultures show no abnormalities. Does have slowly rising pCO2 on VBG - HFNC did not seem to improve elevated pCO2 and instead seemed to potentially cause worsening, possibly due to depressed respiratory drive from high O2. Patient initially was adamant that she would not tolerate BiPAP, however was agreeable to trial on 2/27 PM. Despite intermittent BiPAP overnight, VBG was worse and patient transferred to ICU on 2/28 AM. RT adjusted BiPAP settings while in ICU to AVAPS with improvement in mental status and VBG, and patient transferred back to medicine floor on 2/29. ICU team additionally added theophylline and thyroid replacement as  both can help with diaphragmatic/respiratory muscle weakness in setting of COPD and malnutrition. MRI negative for central problems with respiratory drive.    - neurology consulted to assist with evaluation for possible central etiology of hypercapnic respiratory failure - MRI negative, no central concern  - PRN hypertonic saline inhaler with albuterol nebs  - Continue good pulm toilet  - Transplant pulmonology following, recs appreciated  -Need more data such as exact times of BiPAP and TV setting to know if we need to adjust the settings (gas still not improved w/ wearing)   - PTA immunosuppressive agent  >Prednisone 5 mg every morning, 2.5 mg every afternoon; tacrolimus 4 mg every morning, 3.5mg every afternoon  > Monitor tacro levels, goal 6-8  > -overnight oximetry study suggestive of O2 vs CPAP need, as did require up to 2LPM overnight to prevent hypoxia  > Try to minimize O2 to preserve respiratory drive, will give IVIG for DSA+   - avoid HFNC, maintain minimum oxygen to keep SaO2 >85%   - continue BiPAP when sleeping (overnight and during naps)  - MIP/MEP testing completed  - CXR 2-view on 3/4 w/ pleural effusions due to vol status  - PTA dapsone MWF for PJP prophylaxis  - completed course of zosyn for empiric HAP course (2/17 - 2-23)  - Initial decompensation likely from opioids - limit medications that would depress respiration   - d/c'd theophylline 3/3/24 due to difficulty attaining therapeutic levels   - continue thyroid function as below  - awaiting metabolic CART study results  - may need BiPAP at home as patient reports not having one. Sleep clinic referral at discharge.   - Contacted lung transplant social work team ([facility]) in regards to patient progression. Pulmonary team concerned she is not stable for discharge at this time due to trending BG CO2 levels and not cooperating with BIPAP.     Antibiotics:  Vancomycin (2/17 - 2/17)  Zosyn (2/17 - 2/23)  Dapsone MWF, PJP ppx   Micafungin (2/18-  2/21)     Immunosuppression  Tacrolimus  Prednisone       #Severe malnutrition   #FTT   #Hypoalbuminemia  #Gastroparesis, small bowel dysmotility  #S/P PEG/J with intolerance of enteral nutrition  # Chronic osmotic diarrhea/SIBO s/p Rifaximin > improving     Patient with gastroparesis (presumed due to vagal injury) and small bowel dysmotility complicated by unintentional 40lb weight loss over the past year and now severe malnutrition. Previously intolerant to oral food intake due to nausea. Was initially admitted for portacath and TPN initiation since 2/13. After extubation has been able to tolerate feeds without n/v from 2/20. Electrolytes trending towards baseline today.  Per GI, next steps for workup for malnutrition would include CT enterography to evaluate for anatomic abnormalities contributing to malnutrition vs possible treatment for SIBO (though patient has had multiple courses of treatment in the past with no long term improvement). Patient couldn't tolerate the oral load for CT enterography on 2/21, so will defer this until she is able to tolerate greater PO load. On 2/23 , again discussed with patient the need of small bowel motility study if not improving outpatient through [place]. No ongoing concerns for SIBO on 2/23 as patient is passing multiple episodes of stools and gas with no abdominal pain or distention. C-diff test negative on 2/21. Per RD, patient tolerating >300 kcal of intake PO currently, so stopped trickle Tube feeds and continuing with PO and TPN for nutrition.   - Regular diet + increased TPN +  no further tube feeds per RD & transplant pulm discussion               - Nephrology: limit fluid < 1.5 L per day for TPN ( chart vol of TPN in the I/O). Renal team okay with midline on RUE                - RD concerned pt is not absorbing any oral intake and they will be monitoring Bowel function, I/O and, PO/TF/TPN intake.               -  stopped TF as PO intake sufficient for preventing gut  atrophy  - CT enterography with contrast- Pt not able to tolerate oral contrast load of 1500 ml on 2/21; no ongoing concerns for SIBO, would need small bowel motility study if not improving outpatient through [place]  - Continue daily Weights  - Continue to monitor CMP in the AM    #Acute on chronic Anemia 2/2 ESRD  Hgb have been stable 7-8s, with no acute signs of bleeding, acute drop 2/29 from 8.2 > 6.6 after two rechecks, no overt signs of bleeding noted, patient reports some abdominal pain but otherwise physical exam unremarkable.  LUE US negative for DVT 2/18.    - continue epogen per nephrology with dialysis  - venofer 50 mcg qweek with dialysis  - type and screen performed 2/29, 1 unit pRBCs ordered and transfused      #ESRD on HD M/T/Th/Sat  #Hypervolemic hyponatremia  #Anion gap metabolic acidosis - resolved  Patient is ESRD on T/Th/Sat HD as outpt, Hgb stabilizing. HD tolerating well. Continuing monitoring electrolytes daily. Anion gap normal since 2/21. Will continue to monitor daily labs/ABG  for refeeding syndrome and acidosis.   - Continue Venofer injections    - CBC and CMP daily  - Continue epogen dose 8000 units as per nephrology  - Strict I/Os  - HD M/T/TH/Sat per nephrology given hypervolemia; Plan for 2hr run on M; 3hr on T/Th/S.   - K phos 250 mg bid on 3/4    # Elevated TSH and low T4 and T3  Patient with new low T4 at 0.64 and TSH at 6.5, concerning for new hypothyroidism vs sick thyroid syndrome. TSH with appropriate response, more consistent with elevation in the setting of acute illness. ICU team on 2/28 recommended initiation of treatment for possible hypothyroid as this can improve respiratory muscle function in the setting of weakness and malnutrition.   - continue liothyronine and levothyroxine per ICU team recommendations  - repeat thyroid function studies planned for 3/7/24; adjust dosing as needed      #Left upper extremity unilateral edema, improving   #Bilateral pedal and ankle edema,  improving  Edema due to third spacing due to hypoalbuminemia vs HF. BNP >13935 at admission, Echo on 2/18 shows EF of 55-60% with IVC of <2.1 and collapsing >50% with sniff, normal RA pressure, no significant valvular abnormalities; Left upper extremity swelling and pitting lower extremity edema likely due to hypoalbuminemia improved today. Upper limb duplex USG on 2/18 negative for DVT. Wt went from 43.4 kg on admission to 47.8 kg. She is urinating but cannot chart as she usually urinates with BM. She reports trending to baseline with urination. She denies dysuria, retention symptoms. USG left arm on 2/21 shows steel physiology and Vascular Surgery consulted. Vascular surgery has only minor concerns for steal symptoms and given that the AVF is working well, they are okay with outpatient fistulograms/ venoplasty with wrist brachial index and PPG's, unless new concerns arise. LUE and LE edema significantly decreased since yesterdays HD. No new tingling/ numbness noticed.  - Continue daily weights  - Strict I/Os  - Elevation and wrapping of only lower legs as needed and increased UF per nephrology for volume management; no wrapping of Left upper extremity with dialysis fistula  - lymphedema consulted for BLE edema  -HD as noted above        #Steal physiology of LUE dialysis access fistula  LUE arterial US obtained 2/21 with concern for LUE edema. US of fistula demonstrated steal physiology. Discussed with nephrology, and vascular surgery consulted on 2/22. Vascular surgery has only minor concerns for steal symptoms and given that the AVF is working well, they are okay with outpatient fistulograms/ venoplasty with wrist brachial index and PPG's, unless new concerns arise.  - plan for outpatient workup as above; nephrology in agreement with outpatient workup.     #Facial and neck bruising  #Pain  Reports soreness in her neck from lying in bed. No soreness in the buttocks/ back. Patient has multiple bruises over her arms  and face which is attributed to previous falls. No new bruising reported today. Patients reports her headaches are improving with tylenol. Using heating pads and lidocaine patch for body pains. Couple of small skin tears noted by the Rn's. Skin care done accordingly.  - Tylenol Q4  - Lidocaine patch prn  - Heating pads prn  - Diligent skin cares    #HTN  #A-fib, resolved  Noted atrial fibrillation on arrival with HR elevated at 150, not sustained for > 10 minutes and otherwise hemodynamically stable. Rates stable in the 80's. BP normalizing since 2/22.  - PTA metoprolol 25mg BID - holding for now with lower BP with increased UF per nephrology, resume if becoming more hypertensive  - Continuous telemetry     # Encephalopathy secondary to hypercarbia - resolved  Patient was progressively more lethargic on 2/17 during admission in [facility]. Etiology likely secondary to hypercarbia in context of respiratory failure as patient was noted to have high CO2s concomitant with lethargy. Though receiving oxycodone and fentanyl, lethargy was only partially alleviated by narcan. LFTs and ammonia wnl with low suspicion of hepatic encephalopathy. BUN wnl with low suspicion for uremic encephalopathy. Head CT on 2/18 was negative with low suspicion of acute intracranial pathology. Patient is awake, alert, oriented and following commands since 2/20.     # Right hip fracture s/p ORIF (December 2023)   - PT/OT    # GERD  - PTA PPI    # Hx EBV viremia   # Hypogammaglobulinemia  # Chronic immunosuppression 2/2 lung transplant  Patient has been afebrile and has not had leukocytosis however she is under immunosuppression. Given acute respiratory failure and hx of recurrent pneumonias, she was started on empiric abx for concerns over new pneumonia. RVP and cultures no growth to date. Last day of empirical treatment for HAP.  - EBV 27K (decreased compared to prior)      Lines/tubes/drains:  - CVC RIJ (for TPN, is tunneled line)   -  PIV x2  - PEG/J  - HD AV fistula, left arm      Diet:   Regular diet PO  Majority of nutrition through TPN per RD     General Cares/Prophylaxis:    DVT Prophylaxis: Heparin subcutaneous - resuming with stable Hgb  GI Prophylaxis: PPI  Fluids: As per TPN  Code Status: Full        Diet: Renal Diet (dialysis)  parenteral nutrition - ADULT compounded formula CYCLE  parenteral nutrition - ADULT compounded formula CYCLE    DVT Prophylaxis: resume subQ heparin  Dong Catheter: Not present  Fluids: Per TPN and PO  Lines: PRESENT      PICC 03/04/24 Double Lumen Right Basilic TPN. PICC okay to use.-Site Assessment: WDL  Hemodialysis Vascular Access Arteriovenous graft Superior Arm-Site Assessment: WDL      Cardiac Monitoring: None  Code Status: Full Code      Clinically Significant Risk Factors            # Hypomagnesemia: Lowest Mg = 1.6 mg/dL in last 2 days, will replace as needed   # Hypoalbuminemia: Lowest albumin = 2.6 g/dL at 2/18/2024  5:13 AM, will monitor as appropriate             # Severe Malnutrition: based on nutrition assessment      # Financial/Environmental Concerns: none         Disposition Plan         The patient's care was discussed with the Attending physician Dr. Mendoza, Bedside Nurse, Patient, and Transplant Pulm Consultant(s).    Jass Fishman DDS  [facility] Resident [place] Medicine Service, [facility] TEAM [address] [place] [address]  Securely message with [employer] info)  Text page via [employer] Paging/Directory   See signed in provider for up to date coverage information  ______________________________________________________________________    Interval History   No acute events overnight, reviewed nursing staff notes. Patient resting comfortably in bed. She endorses slight discomfort with PICC site again today, controlled with scheduled Tylenol. She states she did not use her BIPAP as much yesterday or last night. Denies any shortness of breath, dyspnea, dysphagia.    Physical  Exam   Vital Signs: Temp: 98.2  F (36.8  C) Temp src: Oral BP: 127/62 Pulse: 97   Resp: 20 SpO2: 98 % O2 Device: Nasal cannula Oxygen Delivery: 1 LPM  Weight: 100 lbs 4.95 oz  General: thin, laying in bed comfortably, no acute distress this morning. Very pleasant  HEENT: Normocephalic, bruising on the right face around right orbit with hematoma and right neck, improved  Neuro: very awake and alert, fully oriented, following commands, answering questions clearly and easily, moving all extremities.  Pulm/Resp: breathing on NC, no increased work of breathing  CV: appears to be well perfused       Medical Decision Making       Please see A&P for additional details of medical decision making.      Data     I have personally reviewed the following data over the past 24 hrs:    5.2  \   9.0 (L)   / 211     139 102 65.4 (H) /  132 (H)   4.0 26 3.41 (H) \     ALT: <5 AST: 15 AP: 91 TBILI: 0.2   ALB: 3.4 (L) TOT PROTEIN: 5.8 (L) LIPASE: N/A     TSH: 2.24 T4: N/A A1C: N/A       Imaging results reviewed over the past 24 hrs:   No results found for this or any previous visit (from the past 24 hour(s)).

## 2024-03-07 NOTE — PROGRESS NOTES
Nephrology Progress Note  03/07/2024         Assessment & Recommendations:   Sofie Rodriguez is a 63 year old year old female with ESKD, COPD s/p b/l lung transplant in 6/2022 with multiple complications, gastroparesis s/p GJ tube, GIB, chronic diarrhea, recurrent c-diff, FTT with inability to tolerate any tube feeds, R hip fx s/p ORIF 12/2023, admitted on 2/10 for initiation of TPN/lipids, transferred to ICU on 2/17 with worsening mental status and respiratory acidosis in spite of bipap, ultimately intubated on 2/18, being treated for PNA, also with volume overload in setting of high volume TPN. Persistent respiratory acidosis in spite of volume off, transferred to ICU for hypotension and respiratory failure, improved on bipap, now floor status again 3/1    # ESKD - TTS, LUE AVG, 3hr, 45.5kg (will be lowered), [facility], Dr. Andres Fairbanks. She has been losing weight and now even below her recent set EDW.  - Pt is now agreeable to 4x/week dialysis (3 hrs TTS and 2 hrs Monday's)  - appreciate care coordination to assure OP HD can accommodate 4x/week HD  - Requires EMLA cream an hour before HD  - please avoid PICC which will compromise future dialysis accesses; a midline is much preferred for this patient       # Dialysis access: LUE AVG placed 3-4 months ago, per pt. She had arm swelling and a fistulogram a couple months ago and swelling improved but now has redeveloped.  - US with c/f possible steal syndrome  - per vascular surgery, no concern for steal syndrome, ok to go ahead with fistulogram  - given that AVF is working well on dialysis (450 BFR) and at this time IR is only able to perform fistulograms when AVF isn't working well, will defer to OP for management.    # Persistent respiratory acidosis: MRI without c/f central cause; possible weakness of resp muscles. Adding phos to dialysate today  - plan is for bipap at night and during day if napping (though pt doesn't always do this)    # Volume/BP:  "EDW 45.5 kg. Edema due to third spacing due to hypoalbuminemia. Anuric; on metoprolol soln 25 mg bid  - volume overload on CXR and on exam with 1-2+ pitting edema   - No wt today, 3 kg UF  - appreciate condensing TPN (currently 400-600 ml per day)       # Nutrition: on TPN and regular diet  - per team, concern is that pt isn't absorbing much PO or tube feeds with diarrhea increasing significantly with increase in tube feeds; thus needing TPN     # Anemia 2/2 ESKD  On Venofer 50 qwk, Mircera last dose 1/9/2024  - hgb 8's  - Will continue Venofer 50 mcg q week (Tuesday)  - increase epogen dose from 4000 to 8000 units (starting 2/20), TTS    # BMD: Ca 8-9's, phos 3.4 -> 5.1, alb 3.2  - renvela is held, will restart if phos increases again  - Phosphorus was added to TPN     Recommendations were communicated to primary team via note     Jeanette Kim MD   Division of Renal Disease and Hypertension  [phone]    Interval History :   Seen on dialysis, UF reduced from 3 to 2 kg. Added phos to dialysate again today. No n/v, CP, SOB, chills    Review of Systems:   4 point ROS neg other than as noted above    Physical Exam:   I/O last 3 completed shifts:  In: 558.57 [I.V.:10]  Out: 3000 [Other:3000]   /66 (BP Location: Right arm)   Pulse 96   Temp 98.1  F (36.7  C) (Oral)   Resp 20   Ht 1.57 m (5' 1.81\")   Wt 45.5 kg (100 lb 5 oz)   SpO2 99%   BMI 18.46 kg/m       GENERAL APPEARANCE: NAD  PULM: diminished  CV: regular rhythm, normal rate, no rub     -1-2+ pitting pedal and ankles, (LUE (fistula arm) > RUE)  GI: soft   INTEGUMENT: no cyanosis, no rash  NEURO: a/o3  Access Left AVG     Labs:   All labs reviewed by me  Electrolytes/Renal -   Recent Labs   Lab Test 03/07/24  0830 03/07/24  0550 03/06/24  1623 03/06/24  0631 03/05/24  1629 03/05/24  0607   NA  --  139  --  139  --  139   POTASSIUM  --  4.0  --  3.7  --  3.3*   CHLORIDE  --  102  --  103  --  102   CO2  --  26  --  25  --  27   BUN  --  65.4*  --  " 37.4*  --  44.8*   CR  --  3.41*  --  2.47*  --  2.61*   * 113*  --  112*  --  135*   ESTUARDO  --  9.3  --  9.0  --  8.7*   MAG  --  2.3 2.1 2.0   < > 2.7*   PHOS  --  5.1* 4.6* 4.6*   < > 3.4    < > = values in this interval not displayed.       CBC -   Recent Labs   Lab Test 03/07/24  0550 03/06/24  0631 03/05/24  0607   WBC 5.2 4.6 3.6*   HGB 9.0* 9.4* 8.7*    216 214       LFTs -   Recent Labs   Lab Test 03/07/24  0550 03/06/24  0631 03/05/24  0607   ALKPHOS 91 86 78   BILITOTAL 0.2 0.2 0.2   ALT <5 <5 5   AST 15 14 14   PROTTOTAL 5.8* 5.8* 5.5*   ALBUMIN 3.4* 3.4* 3.2*       Iron Panel -   Recent Labs   Lab Test 09/26/22  0555 09/03/22  1039 08/24/22  0810   IRON 54 21* 41   IRONSAT 22 9* 21   CARLOS 769* 343* 334*         Imaging:  All imaging studies reviewed by me.     Current Medications:   calcium carbonate-vitamin D  1 tablet Per J Tube TID w/meals    cyanocobalamin  500 mcg Per Feeding Tube Daily    dapsone  50 mg Per J Tube Q Mon Wed Fri AM    heparin ANTICOAGULANT  5,000 Units Subcutaneous Q12H    levalbuterol  1.25 mg Nebulization 2 times daily    levothyroxine  25 mcg Oral QAM AC    lidocaine  1 patch Transdermal Q24h    liothyronine  2.5 mcg Oral BID    lipids 4 oil  250 mL Intravenous Once per day on Monday Tuesday Wednesday Thursday Friday    [Held by provider] metoprolol  25 mg Per J Tube BID    pantoprazole  40 mg Per J Tube BID    predniSONE  5 mg Per J Tube QAM    And    predniSONE  2.5 mg Per J Tube QPM    [Held by provider] sevelamer carbonate (RENVELA)  0.8 g Oral BID    sodium chloride (PF)  10 mL Intracatheter Q8H    tacrolimus  4 mg Per G Tube QAM    And    tacrolimus  3.5 mg Per G Tube QPM      dextrose      heparin (porcine) 500 Units/hr (02/24/24 0839)    - MEDICATION INSTRUCTIONS -      parenteral nutrition - ADULT compounded formula CYCLE      parenteral nutrition - ADULT compounded formula CYCLE Stopped (03/07/24 1233)    - MEDICATION INSTRUCTIONS -      sodium chloride 0.9%        Jeanette Kim MD

## 2024-03-07 NOTE — PROGRESS NOTES
[facility] [place]   Palliative Care Daily Progress Note          Palliative Care was consulted for symptoms and support. At this time the patient does not have any needs that we are actively addressing, so we will sign off. Please feel free to reconsult us if we can be helpful in the future. Thank you for the opportunity to be involved in the care of this patient.    Sophia Gillette MD  Securely message with [employer] (more info)  Text page via [employer] Paging/Directory     During regular M-F work hours -- please contact team via Securely message with the Voc[employer] Web Console (learn more here)  After regular work hours and on weekends/holidays, you can call our answering service at [phone]. Also, who's on call for us is available in [employer] Web.

## 2024-03-07 NOTE — PROGRESS NOTES
HEMODIALYSIS TREATMENT NOTE    Date: 3/7/2024  Time: 11:08 AM    Data:  Weight change: 3 kg  Ultrafiltration - Post Run Net Total Removed (mL): 3000 mL  Vascular Access Status: patent  Dialyzer Rinse: Streaked  Total Blood Volume Processed: 65.52 L Liters  Total Dialysis (Treatment) Time: 3.0 Hours  K3Ca2.25     AVG 15g x 2 successful  Lab:   No    Interventions:  Epo administered. Routine BG spot check- see results for corresponding values. B/t+. Pt cannulated w 15g x 2 successful, BFRE 400 . K3Ca2.25 per protocol based off labs 03/07/2024- see results for corresponding values. No complications related to dialysis. Total tx time 3.0Hr and 3.0L NET removal. Pt turned and repositioned, fresh linens provided     Assessment:  A&ox4, HR-RRR. 20rpm. Lung sounds diminished ant & post BUL/BLL. Trace edema present in ble. Pt on 1L/min NC SATs 95+%. B/t+ , AVG hemostasis obtained within 10 minutes. Fresh bandages applied, pt denies complaints of pain and denies complaints since last tx r.t iHD. Fresh lines provided and pt repositioned.       Plan:    Per renal and pcn

## 2024-03-08 ENCOUNTER — APPOINTMENT (OUTPATIENT)
Dept: OCCUPATIONAL THERAPY | Facility: CLINIC | Age: 64
DRG: 640 | End: 2024-03-08
Attending: INTERNAL MEDICINE
Payer: MEDICARE

## 2024-03-08 ENCOUNTER — APPOINTMENT (OUTPATIENT)
Dept: PHYSICAL THERAPY | Facility: CLINIC | Age: 64
DRG: 640 | End: 2024-03-08
Attending: INTERNAL MEDICINE
Payer: MEDICARE

## 2024-03-08 ENCOUNTER — APPOINTMENT (OUTPATIENT)
Dept: GENERAL RADIOLOGY | Facility: CLINIC | Age: 64
DRG: 640 | End: 2024-03-08
Payer: MEDICARE

## 2024-03-08 ENCOUNTER — APPOINTMENT (OUTPATIENT)
Dept: ULTRASOUND IMAGING | Facility: CLINIC | Age: 64
DRG: 640 | End: 2024-03-08
Payer: MEDICARE

## 2024-03-08 LAB
ALBUMIN SERPL BCG-MCNC: 3.3 G/DL (ref 3.5–5.2)
ALP SERPL-CCNC: 86 U/L (ref 40–150)
ALT SERPL W P-5'-P-CCNC: <5 U/L (ref 0–50)
ANION GAP SERPL CALCULATED.3IONS-SCNC: 8 MMOL/L (ref 7–15)
AST SERPL W P-5'-P-CCNC: 14 U/L (ref 0–45)
BASE EXCESS BLDV CALC-SCNC: 1.2 MMOL/L (ref -3–3)
BASOPHILS # BLD AUTO: ABNORMAL 10*3/UL
BASOPHILS # BLD MANUAL: 0 10E3/UL (ref 0–0.2)
BASOPHILS NFR BLD AUTO: ABNORMAL %
BASOPHILS NFR BLD MANUAL: 0 %
BILIRUB SERPL-MCNC: 0.2 MG/DL
BUN SERPL-MCNC: 50 MG/DL (ref 8–23)
CALCIUM SERPL-MCNC: 8.6 MG/DL (ref 8.8–10.2)
CHLORIDE SERPL-SCNC: 99 MMOL/L (ref 98–107)
CREAT SERPL-MCNC: 2.69 MG/DL (ref 0.51–0.95)
DEPRECATED HCO3 PLAS-SCNC: 30 MMOL/L (ref 22–29)
EGFRCR SERPLBLD CKD-EPI 2021: 19 ML/MIN/1.73M2
EOSINOPHIL # BLD AUTO: ABNORMAL 10*3/UL
EOSINOPHIL # BLD MANUAL: 0.1 10E3/UL (ref 0–0.7)
EOSINOPHIL NFR BLD AUTO: ABNORMAL %
EOSINOPHIL NFR BLD MANUAL: 2 %
ERYTHROCYTE [DISTWIDTH] IN BLOOD BY AUTOMATED COUNT: 21.7 % (ref 10–15)
GLUCOSE SERPL-MCNC: 113 MG/DL (ref 70–99)
HCO3 BLDV-SCNC: 31 MMOL/L (ref 21–28)
HCT VFR BLD AUTO: 31.3 % (ref 35–47)
HGB BLD-MCNC: 9 G/DL (ref 11.7–15.7)
IMM GRANULOCYTES # BLD: ABNORMAL 10*3/UL
IMM GRANULOCYTES NFR BLD: ABNORMAL %
LYMPHOCYTES # BLD AUTO: ABNORMAL 10*3/UL
LYMPHOCYTES # BLD MANUAL: 0.8 10E3/UL (ref 0.8–5.3)
LYMPHOCYTES NFR BLD AUTO: ABNORMAL %
LYMPHOCYTES NFR BLD MANUAL: 13 %
MAGNESIUM SERPL-MCNC: 1.8 MG/DL (ref 1.7–2.3)
MAGNESIUM SERPL-MCNC: 1.8 MG/DL (ref 1.7–2.3)
MCH RBC QN AUTO: 35.7 PG (ref 26.5–33)
MCHC RBC AUTO-ENTMCNC: 28.8 G/DL (ref 31.5–36.5)
MCV RBC AUTO: 124 FL (ref 78–100)
METAMYELOCYTES # BLD MANUAL: 0.1 10E3/UL
METAMYELOCYTES NFR BLD MANUAL: 1 %
MONOCYTES # BLD AUTO: ABNORMAL 10*3/UL
MONOCYTES # BLD MANUAL: 0.3 10E3/UL (ref 0–1.3)
MONOCYTES NFR BLD AUTO: ABNORMAL %
MONOCYTES NFR BLD MANUAL: 5 %
MYELOCYTES # BLD MANUAL: 0.1 10E3/UL
MYELOCYTES NFR BLD MANUAL: 2 %
NEUTROPHILS # BLD AUTO: ABNORMAL 10*3/UL
NEUTROPHILS # BLD MANUAL: 4.5 10E3/UL (ref 1.6–8.3)
NEUTROPHILS NFR BLD AUTO: ABNORMAL %
NEUTROPHILS NFR BLD MANUAL: 77 %
NRBC # BLD AUTO: 0 10E3/UL
NRBC BLD AUTO-RTO: 0 /100
O2/TOTAL GAS SETTING VFR VENT: 2 %
OXYHGB MFR BLDV: 84 % (ref 70–75)
PCO2 BLDV: 80 MM HG (ref 40–50)
PH BLDV: 7.2 [PH] (ref 7.32–7.43)
PHOSPHATE SERPL-MCNC: 3.4 MG/DL (ref 2.5–4.5)
PHOSPHATE SERPL-MCNC: 3.7 MG/DL (ref 2.5–4.5)
PLAT MORPH BLD: ABNORMAL
PLATELET # BLD AUTO: 212 10E3/UL (ref 150–450)
PO2 BLDV: 54 MM HG (ref 25–47)
POTASSIUM SERPL-SCNC: 3.7 MMOL/L (ref 3.4–5.3)
PROT SERPL-MCNC: 5.6 G/DL (ref 6.4–8.3)
RBC # BLD AUTO: 2.52 10E6/UL (ref 3.8–5.2)
RBC MORPH BLD: ABNORMAL
SAO2 % BLDV: 86.6 % (ref 70–75)
SODIUM SERPL-SCNC: 137 MMOL/L (ref 135–145)
TACROLIMUS BLD-MCNC: 6.1 UG/L (ref 5–15)
TME LAST DOSE: NORMAL H
TME LAST DOSE: NORMAL H
VARIANT LYMPHS BLD QL SMEAR: PRESENT
WBC # BLD AUTO: 5.8 10E3/UL (ref 4–11)

## 2024-03-08 PROCEDURE — 84100 ASSAY OF PHOSPHORUS: CPT

## 2024-03-08 PROCEDURE — 250N000009 HC RX 250

## 2024-03-08 PROCEDURE — 97535 SELF CARE MNGMENT TRAINING: CPT | Mod: GO

## 2024-03-08 PROCEDURE — 250N000013 HC RX MED GY IP 250 OP 250 PS 637

## 2024-03-08 PROCEDURE — 85007 BL SMEAR W/DIFF WBC COUNT: CPT

## 2024-03-08 PROCEDURE — 83735 ASSAY OF MAGNESIUM: CPT

## 2024-03-08 PROCEDURE — 36592 COLLECT BLOOD FROM PICC: CPT | Performed by: STUDENT IN AN ORGANIZED HEALTH CARE EDUCATION/TRAINING PROGRAM

## 2024-03-08 PROCEDURE — 999N000157 HC STATISTIC RCP TIME EA 10 MIN

## 2024-03-08 PROCEDURE — 36415 COLL VENOUS BLD VENIPUNCTURE: CPT

## 2024-03-08 PROCEDURE — 94640 AIRWAY INHALATION TREATMENT: CPT

## 2024-03-08 PROCEDURE — 94640 AIRWAY INHALATION TREATMENT: CPT | Mod: 76

## 2024-03-08 PROCEDURE — 76000 FLUOROSCOPY <1 HR PHYS/QHP: CPT

## 2024-03-08 PROCEDURE — 82805 BLOOD GASES W/O2 SATURATION: CPT

## 2024-03-08 PROCEDURE — 250N000012 HC RX MED GY IP 250 OP 636 PS 637: Performed by: STUDENT IN AN ORGANIZED HEALTH CARE EDUCATION/TRAINING PROGRAM

## 2024-03-08 PROCEDURE — 93971 EXTREMITY STUDY: CPT | Mod: 26 | Performed by: STUDENT IN AN ORGANIZED HEALTH CARE EDUCATION/TRAINING PROGRAM

## 2024-03-08 PROCEDURE — 250N000009 HC RX 250: Performed by: STUDENT IN AN ORGANIZED HEALTH CARE EDUCATION/TRAINING PROGRAM

## 2024-03-08 PROCEDURE — 36592 COLLECT BLOOD FROM PICC: CPT

## 2024-03-08 PROCEDURE — 250N000012 HC RX MED GY IP 250 OP 636 PS 637

## 2024-03-08 PROCEDURE — 99233 SBSQ HOSP IP/OBS HIGH 50: CPT | Mod: 24 | Performed by: STUDENT IN AN ORGANIZED HEALTH CARE EDUCATION/TRAINING PROGRAM

## 2024-03-08 PROCEDURE — 94660 CPAP INITIATION&MGMT: CPT

## 2024-03-08 PROCEDURE — 250N000011 HC RX IP 250 OP 636

## 2024-03-08 PROCEDURE — 120N000002 HC R&B MED SURG/OB UMMC

## 2024-03-08 PROCEDURE — 93971 EXTREMITY STUDY: CPT | Mod: RT

## 2024-03-08 PROCEDURE — 80197 ASSAY OF TACROLIMUS: CPT | Performed by: STUDENT IN AN ORGANIZED HEALTH CARE EDUCATION/TRAINING PROGRAM

## 2024-03-08 PROCEDURE — 99233 SBSQ HOSP IP/OBS HIGH 50: CPT | Performed by: STUDENT IN AN ORGANIZED HEALTH CARE EDUCATION/TRAINING PROGRAM

## 2024-03-08 PROCEDURE — 97530 THERAPEUTIC ACTIVITIES: CPT | Mod: GO

## 2024-03-08 PROCEDURE — 97116 GAIT TRAINING THERAPY: CPT | Mod: GP

## 2024-03-08 PROCEDURE — 250N000009 HC RX 250: Performed by: INTERNAL MEDICINE

## 2024-03-08 PROCEDURE — 76000 FLUOROSCOPY <1 HR PHYS/QHP: CPT | Mod: 26 | Performed by: STUDENT IN AN ORGANIZED HEALTH CARE EDUCATION/TRAINING PROGRAM

## 2024-03-08 PROCEDURE — 97530 THERAPEUTIC ACTIVITIES: CPT | Mod: GP

## 2024-03-08 PROCEDURE — 85027 COMPLETE CBC AUTOMATED: CPT

## 2024-03-08 PROCEDURE — 80053 COMPREHEN METABOLIC PANEL: CPT

## 2024-03-08 RX ADMIN — TACROLIMUS 3.5 MG: 5 CAPSULE ORAL at 17:34

## 2024-03-08 RX ADMIN — LEVALBUTEROL HYDROCHLORIDE 1.25 MG: 1.25 SOLUTION RESPIRATORY (INHALATION) at 09:04

## 2024-03-08 RX ADMIN — TACROLIMUS 4 MG: 5 CAPSULE ORAL at 09:17

## 2024-03-08 RX ADMIN — Medication 40 MG: at 19:48

## 2024-03-08 RX ADMIN — PREDNISONE 5 MG: 5 TABLET ORAL at 09:17

## 2024-03-08 RX ADMIN — LOPERAMIDE HYDROCHLORIDE 2 MG: 2 CAPSULE ORAL at 19:48

## 2024-03-08 RX ADMIN — LIDOCAINE 4% 1 PATCH: 40 PATCH TOPICAL at 19:48

## 2024-03-08 RX ADMIN — CALCIUM CARBONATE 600 MG (1,500 MG)-VITAMIN D3 400 UNIT TABLET 1 TABLET: at 09:17

## 2024-03-08 RX ADMIN — SMOFLIPID 250 ML: 6; 6; 5; 3 INJECTION, EMULSION INTRAVENOUS at 20:12

## 2024-03-08 RX ADMIN — CALCIUM CARBONATE 600 MG (1,500 MG)-VITAMIN D3 400 UNIT TABLET 1 TABLET: at 14:27

## 2024-03-08 RX ADMIN — PREDNISONE 2.5 MG: 2.5 TABLET ORAL at 19:44

## 2024-03-08 RX ADMIN — DAPSONE 50 MG: 100 TABLET ORAL at 09:16

## 2024-03-08 RX ADMIN — Medication 40 MG: at 09:16

## 2024-03-08 RX ADMIN — HEPARIN SODIUM 5000 UNITS: 5000 INJECTION, SOLUTION INTRAVENOUS; SUBCUTANEOUS at 17:33

## 2024-03-08 RX ADMIN — CALCIUM CARBONATE 600 MG (1,500 MG)-VITAMIN D3 400 UNIT TABLET 1 TABLET: at 17:34

## 2024-03-08 RX ADMIN — LOPERAMIDE HYDROCHLORIDE 2 MG: 2 CAPSULE ORAL at 09:16

## 2024-03-08 RX ADMIN — LEVALBUTEROL HYDROCHLORIDE 1.25 MG: 1.25 SOLUTION RESPIRATORY (INHALATION) at 21:25

## 2024-03-08 RX ADMIN — CYANOCOBALAMIN TAB 500 MCG 500 MCG: 500 TAB at 09:17

## 2024-03-08 RX ADMIN — LEVOTHYROXINE SODIUM 25 MCG: 0.03 TABLET ORAL at 07:04

## 2024-03-08 RX ADMIN — MAGNESIUM SULFATE HEPTAHYDRATE: 500 INJECTION, SOLUTION INTRAMUSCULAR; INTRAVENOUS at 20:08

## 2024-03-08 RX ADMIN — Medication 2.5 MCG: at 19:44

## 2024-03-08 RX ADMIN — Medication 2.5 MCG: at 09:18

## 2024-03-08 RX ADMIN — HEPARIN SODIUM 5000 UNITS: 5000 INJECTION, SOLUTION INTRAVENOUS; SUBCUTANEOUS at 07:04

## 2024-03-08 ASSESSMENT — ACTIVITIES OF DAILY LIVING (ADL)
ADLS_ACUITY_SCORE: 36

## 2024-03-08 NOTE — PROGRESS NOTES
"Pt wear BIPAP for 5 hours ( from 0032 am to 0528 am ) and \"pt stated needing break\".pt tolerated well. Pt is on 1L Nc now.     Mo Dover, RT on 3/8/2024 at 6:15 AM    "

## 2024-03-08 NOTE — PLAN OF CARE
Goal Outcome Evaluation:      Plan of Care Reviewed With: patient    Overall Patient Progress: improving    Outcome Evaluation: Pt A&Ox4. VSS on BiPAP. Up w/ A1. Mild pain in hip, tylenol given. Meds given through J. G clamped. TPN and lipids given through R PICC. Able to make needs known. Charted in flowsheets when BiPAP was on and off. Overall, she wore it more than last night, more time consequently worn.

## 2024-03-08 NOTE — PROGRESS NOTES
Pulmonary Medicine  Cystic Fibrosis - Lung Transplant Team  Progress Note  2024     Patient: Sofie Rodriguez  MRN: [id number]  : 1960 (age 63 year old)  Transplant: 2022 (Lung), POD#[id number]  Admission date: 2/10/2024    Assessment & Plan:     Sofie Rodriguez is a 63 year old female with h/o COPD s/p BSLT () with course complicated by post-operative hemidiaphragm palsy, recurrent PNAs, positive DSA, EBV viremia, hypogammaglobulinemia, severe gastroparesis s/p G/J tube placement (22) and pyloric botox (23), GI bleed 2/2 pyloric ulcer, hemobilia s/p ERCP and MRCP, chronic diarrhea, recurrent C diff colitis, ESRD on iHD, recurrent falls with injuries (recent right hip fx s/p ORIF 2023), deconditioning, and FTT.  Admitted 2/10 for initiation of TPN/lipids.  Transferred to ICU  for emergent intubation for hypoxic and hypercapneic respiratory failure with severe respiratory acidosis and encephalopathy.  iHD increased, infectious workup unremarkable.  Extubated .  Continues with persistent and progressive hypercapnia with severe acidosis, returned to ICU - d/t tenuous respiratory status.  Improvement noted with increase in tolerance of NIPPV, now off during the day with stable blood gases.  Discharge pending medical stability (with TPN/lipids and overnight AVAPS), therapy recommending TCU (pt. hesitant).     Today's recommendations:  - AVAPS at night and with naps, minimize interruptions as able, will try to assess tolerance and tidal volumes to help with titration  - Daily AM VBG  - SNIF testing today to assess diaphragm function  - Tacro level today at goal, next level 3/11  - Sputum cultures ordered if able to collect  - Repeat DSA pending from 3/6  - Repeat CMV, Prospera, and EBV ordered 3/18  - TPN/lipids per primary (PICC line in place), anticipate continued need after discharge given GI concerns for negligible enteral nutritional absorption potential  - iHD 4x/week  schedule (MTTSa), per nephrology would likely need this schedule while on TPN given volume reaccumulation     Acute on chronic hypoxic/hypercapneic respiratory failure:  S/p bilateral sequential lung transplant for COPD:  Right hemidiaphragm palsy:   Hypoventilation, Suspected CARLEE: CT PTA (2/7) with decreased MARLON opacities but new tree in bud RLL opacities.  PFTs unchanged in clinic (but ATS criteria not met), remain significantly below her baseline.  Baseline hypoxia with 2L NC overnight.  Respiratory decompensation with encephalopathy and severe respiratory acidosis on 2/17.  S/p intubation 2/17-2/19.  Repeat CT (2/17) with new patchy consolidative and nodular opacities, GGO primarily in the bases and intralobular septal thickening (concerning for pulmonary edema given recent addition of TPN nutrition, new infection, PTLD, and/or septic emboli.  Bronch with lavage of RML (2/18) with very friable tissue, cutlures only with C. glabrata.  S/p empiric Zosyn (2/17-2/24) and micafungin (2/18-2/21).  Severe respiratory acidosis with hypercapnia persisting with slight improvement with NIPPV treatment, limited by pt. tolerance to pressure and mask (claustrophobia).  Persistent respiratory failure and variable encephalopathy also complicated by diaphragmatic weakness, hypoventilation, and deconditioning d/t malnutrition.  Repeat CT (2/26) with diffuse GG and consolidative opacities >BLL and diffuse interlobular septal thickening.  Intermittent tolerance of NIPPV persists, reports some improvement in comfort with transition from BiPAP to AVAPS.  Neurology consulted 2/27.  Procal increased to 1.56 (2/28).  Transferred to ICU 2/28 given tenuous respiratory status and potential need for reintubation.  Improvement noted with continuous NIPPV with minimal interruptions (sodium bicarb also stopped, likely partially contributing), trial off NIPPV during the day started 2/29.  MRI brain (3/1) with moderate leukoaraiosis, no acute  intracranial pathology.  CXR (3/5) with decreased left effusion, stable right effusion. CTA without PE, arterial anastomosis patent, dependent opacities and septal thickening.  - AVAPS at night and with naps, minimize interruptions as able, please document tolerance, tidal volumes as able to help with titration of settings  - Daily AM VBG  - SNIF testing today for diaphragm function  - Blood cultures (2/28) NGTD  - Sputum cultures ordered if able to collect  - Xopenex BID (2/27)  - Defer ABX at this time  - Sleep clinic eval indicated as OP     Immunosuppression:  ImmuKnow low at 108 on 1/10.  AZA previously stopped 5/2023 d/t low ImmuKnow assay and EBV viremia.  Repeat ImmuKnow (2/27) low at 88, defer dose adjustment (tacro goal already decreased one day prior).  - Tacrolimus 4 mg qAM / 3.5 mg qPM via G tube (decreased 3/5). Goal 6-8 (decreased 2/26 d/t ImmuKnow and concern for infection).  Repeat level of 6.1 on 3/8, next level 3/11.  - Prednisone 5 mg qAM / 2.5 mg qPM     Prophylaxis:   - Dapsone 50 mg q MWF for PJP ppx  - CMV ppx not currently indicated, D+/R+, CMV negative 2/19 (BAL very mildly positive 2/18, likely not clinically significant), repeat CMV ordered 3/18     Positive DSA: PFTs and pulmonary symptoms have remained stable as OP, so AMR treatment deferred given frailty.  Most recent DSA (2/7) with DQB2 mfi 6966 (from 5723 one month prior).  Most recent cell-free DNA Prospera (2/18) mildly elevated at 1.04 (concerning for possible rejection), which was increased from prior level of 0.12 (1/10).  IST increase deferred at that time per Dr. Gong.  S/p IVIG (2/27) for DSA (IgG WNL).  - Repeat DSA pending (3/6)  - Repeat Prospera ordered 3/18      EBV viremia: CT CAP (2/7) without lymphadenopathy.  Most recent level (2/18) improved to 28k from 96k (2/7)  - Repeat EBV ordered 3/18     Other relevant problems being managed by the primary team:      FTT:  Severe protein calorie malnutrition:  Gastroparesis  s/p PEG/J, botox, and G-POEM:  SB hypomotility:  Pyloric ulcer:  Chronic nausea and osmotic diarrhea:  SIBO s/p rifaximin:   Recurrent C diff colitis: Chronic osmotic and infectious diarrhea since transplant with recurrent episodes of C diff.  Notable weight loss (40# in a year) d/t diarrhea, GI dysmotility, and intolerance of enteral feeds (PEG/J tube in place), most recently on elemental formula.  Extensive OP eval and f/u with GI.  S/p port placement for TPN and lipids.  Tolerating modest PO intake (likely absorbing minimal per GI), monitoring for refeeding syndrome.    - PO diet for pleasure only  - TPN/lipids per primary (PICC line in place), anticipate continued need after discharge given GI concerns for negligible enteral nutritional absorption potential  - CT enterography recommended per GI, but unlikely to be able to tolerate the required 1.5 L enteral contrast bolus so deferring for now     ESRD: CT scan (with declining respiratory status) with volume overload secondary to TPN volume, iHD increased from PTA TThSa schedule with unsustained improvement.  Management per nephrology, dialysis via [facility].    - iHD 4x/week schedule (MTTSa), per nephrology would likely need this schedule while on TPN given volume reaccumulation     We appreciate the excellent care provided by the [facility] team.  Recommendations communicated via this note and in person.  Will continue to follow along closely, please do not hesitate to call with any questions or concerns.     Yulisa Huff MD PhD  Lung Transplant  Pulmonary Critical Care      Subjective & Interval History:     Wore BiPAP 5 hrs last night with slightly improved respiratory acidosis this morning.     Review of Systems:     C: No fever, no chills, no change in weight, no change in appetite  INTEGUMENTARY/SKIN: + Ecchymosis from falls  ENT/MOUTH: No nasal congestion or drainage  RESP: See interval history  CV: No chest pain, no palpitations, +  peripheral edema, no orthopnea  GI: + Occ nausea, no vomiting, stable loose stools, no reflux symptoms  : See interval history  MUSCULOSKELETAL: + Shoulder/hip pain  ENDOCRINE: Blood sugars with adequate control  NEURO: No headache, + tingling to hands/feet (at least several weeks but newly reported by pt.)  PSYCHIATRIC: Mood stable    Physical Exam:     All notes, images, and labs from past 24 hours (at minimum) were reviewed.    Vital signs:  Temp: 98.1  F (36.7  C) Temp src: Oral BP: (!) 143/69 Pulse: 95   Resp: 21 SpO2: 99 % O2 Device: Nasal cannula Oxygen Delivery: 1 LPM   Weight: 46.1 kg (101 lb 9.6 oz)  I/O:     Intake/Output Summary (Last 24 hours) at 3/8/2024 0824  Last data filed at 3/8/2024 0549  Gross per 24 hour   Intake 130 ml   Output 3000 ml   Net -2870 ml       Constitutional: Lying in bed in dialysis, in no apparent distress.   HEENT: Eyes with pink conjunctivae, anicteric.  Oral mucosa moist without lesions.   PULM: Mildly diminished air flow bilaterally.  No crackles, no rhonchi, no wheezes.  Non-labored breathing on 1L NC.  CV: Tachycardia.  No murmur, gallop, or rub.  2-3+ BLE edema.   ABD: NABS, soft, nontender, nondistended.  PEG/J tube site cdi.  MSK: Moves all extremities.  ++ muscle wasting.   NEURO: Sleeping.   SKIN: Warm, dry, diffuse ecchymosis to extremities and right neck.   PSYCH: Mood stable.     Data:     LABS    CMP:   Recent Labs   Lab 03/08/24  0555 03/07/24  1606 03/07/24  0830 03/07/24  0550 03/06/24  1623 03/06/24  0631 03/05/24  1629 03/05/24  0607     --   --  139  --  139  --  139   POTASSIUM 3.7  --   --  4.0  --  3.7  --  3.3*   CHLORIDE 99  --   --  102  --  103  --  102   CO2 30*  --   --  26  --  25  --  27   ANIONGAP 8  --   --  11  --  11  --  10   *  --  132* 113*  --  112*  --  135*   BUN 50.0*  --   --  65.4*  --  37.4*  --  44.8*   CR 2.69*  --   --  3.41*  --  2.47*  --  2.61*   GFRESTIMATED 19*  --   --  15*  --  22*  --  20*   ESTUARDO 8.6*  --   --  " 9.3  --  9.0  --  8.7*   MAG 1.8 1.7  --  2.3 2.1 2.0   < > 2.7*   PHOS 3.4 2.7  --  5.1* 4.6* 4.6*   < > 3.4   PROTTOTAL 5.6*  --   --  5.8*  --  5.8*  --  5.5*   ALBUMIN 3.3*  --   --  3.4*  --  3.4*  --  3.2*   BILITOTAL 0.2  --   --  0.2  --  0.2  --  0.2   ALKPHOS 86  --   --  91  --  86  --  78   AST 14  --   --  15  --  14  --  14   ALT <5  --   --  <5  --  <5  --  5    < > = values in this interval not displayed.     CBC:   Recent Labs   Lab 03/08/24  0555 03/07/24  0550 03/06/24  0631 03/05/24  0607   WBC 5.8 5.2 4.6 3.6*   RBC 2.52* 2.52* 2.58* 2.44*   HGB 9.0* 9.0* 9.4* 8.7*   HCT 31.3* 31.3* 31.9* 29.8*   * 124* 124* 122*   MCH 35.7* 35.7* 36.4* 35.7*   MCHC 28.8* 28.8* 29.5* 29.2*   RDW 21.7* 21.9* 22.2* 22.5*    211 216 214       INR:   Recent Labs   Lab 03/04/24  0713   INR 0.99       Glucose:   Recent Labs   Lab 03/08/24  0555 03/07/24  0830 03/07/24  0550 03/06/24  0631 03/05/24  0607 03/04/24  0713   * 132* 113* 112* 135* 142*       Blood Gas:   Recent Labs   Lab 03/08/24  0555 03/07/24  1750 03/07/24  0550 03/06/24  0631   PHV 7.20*  --  7.17* 7.24*   PCO2V 80*  --  82* 71*   PO2V 54*  --  59* 40   HCO3V 31*  --  30* 31*   LINDY 1.2  --  -0.1 2.1   O2PER 2 1 21 35       Culture Data No results for input(s): \"CULT\" in the last 168 hours.    Virology Data:   Lab Results   Component Value Date    FLUAH1 Not Detected 02/18/2024    FLUAH3 Not Detected 02/18/2024    SH4858 Not Detected 02/18/2024    IFLUB Not Detected 02/18/2024    RSVA Not Detected 02/18/2024    RSVB Not Detected 02/18/2024    PIV1 Not Detected 02/18/2024    PIV2 Not Detected 02/18/2024    PIV3 Not Detected 02/18/2024    HMPV Not Detected 02/18/2024       Historical CMV results (last 3 of prior testing):  Lab Results   Component Value Date    CMVQNT Not Detected 02/19/2024    CMVQNT Not Detected 02/07/2024    CMVQNT Not Detected 01/10/2024     Lab Results   Component Value Date    CMVLOG 3.2 07/12/2023    CMVLOG " <2.1 04/19/2023    CMVLOG 3.5 01/25/2023       Urine Studies    Recent Labs   Lab Test 02/18/24  0222 05/18/23  0627   URINEPH 7.5* 5.0   NITRITE Negative Negative   LEUKEST Trace* Moderate*   WBCU 66* 21*       Most Recent Breeze Pulmonary Function Testing (FVC/FEV1 only)  FVC-Pre   Date Value Ref Range Status   02/07/2024 1.19 L    01/10/2024 1.12 L    08/29/2023 1.48 L    07/25/2023 1.55 L      FVC-%Pred-Pre   Date Value Ref Range Status   02/07/2024 42 %    01/10/2024 39 %    08/29/2023 53 %    07/25/2023 55 %      FEV1-Pre   Date Value Ref Range Status   02/07/2024 1.13 L    01/10/2024 1.10 L    08/29/2023 1.43 L    07/25/2023 1.54 L      FEV1-%Pred-Pre   Date Value Ref Range Status   02/07/2024 51 %    01/10/2024 49 %    08/29/2023 64 %    07/25/2023 69 %        IMAGING    Recent Results (from the past 48 hour(s))   IR CVC Tunnel Removal Right    Narrative    PROCEDURE: Tunneled central venous catheter removal    Procedural Personnel  Attending physician(s): Beverly Paul M.D. I, Dr. Manpreet Paul performed the  entirety of this procedure without assistance.    Fellow physician(s): None  Resident physician(s): None  Advanced practice provider(s): None    Pre-procedure diagnosis: Right lower extremity single lumen tunneled  line is no longer needed  Post-procedure diagnosis: Same  Indication: Catheter no longer needed  Additional clinical history: None    Complications: No immediate complications.      Impression    IMPRESSION:    Removal of right-sided tunneled central venous catheter.    Plan:     Please re-consult interventional radiology if new catheter placement  is desired.  _______________________________________________________________    PROCEDURE SUMMARY:  - Tunneled central venous catheter removal  - Additional procedure(s): None    PROCEDURE DETAILS:    Pre-procedure  Consent: Informed consent for the procedure including risks, benefits  and alternatives was obtained and  time-out was performed prior to the  procedure.  Preparation: The site was prepared and draped using maximal sterile  barrier technique including cutaneous antisepsis.    Anesthesia/sedation  Level of anesthesia/sedation: No sedation    Catheter removal  Local anesthesia was administered. The catheter was removed with a  combination of traction and blunt dissection. Fluoroscopic images were  not obtained to document removal.    Closure  Hemostasis was achieved with manual compression. Sterile dressing(s)  applied.    Additional Details  Additional description of procedure: None  Device used: None  Equipment details: None  Specimens removed: Tunneled central venous catheter.   Estimated blood loss (mL): Less than 10  Standardized report: [email or web address]    Attestation  Signer name: MARK SOSA  I attest that I was present for the entire procedure. I reviewed the  stored images and agree with the report as written.    MARK SOSA         SYSTEM ID:  [id number]

## 2024-03-08 NOTE — PLAN OF CARE
Goal Outcome Evaluation:      Plan of Care Reviewed With: patient    Overall Patient Progress: no change    Outcome Evaluation: Pt A&O. VSS on 1/2 L O2 NC. Up w/ A1+W. Denies pain. Had HD this AM.Good appetite. Meds given through [facility] PICC-. Had CT done this evening. Oliguric. 1 loose stool this shift, imodium given. Worked w/ PT. Calls appropriately. Will cont to monitor.

## 2024-03-08 NOTE — PROGRESS NOTES
Pt consulted for BIPAP use d/t trending increase CO2. Pt says she was on BIPAP for 5 hrs overnight and CO2 was still trending up. Pt does not like to be on the BIPAP but is willing to try one more time tonight and says she does not want to be on BIPAP anymore if the device does not help. RT will continue to monitor pt as needed.    Emily Hsu, RT

## 2024-03-08 NOTE — PROGRESS NOTES
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 On 2/17/24 she was transferred to ICU for worsening mental status and acute hypoxic and hypercarbic respiratory failure inspite of BiPAP requiring intubation. She was suspected to have PNA and started on antibiotics. Extubated on 2/19 without complications and tolerated iHD on 2/20, and tolerated PO regular diet in addition to TPN. Developed progressively worsening respiratory acidosis and hypercarbia, and was re-admitted to ICU on 2/28/24 for close monitoring of intermittent BiPAP and possible intubation, however she improved on AVAPS setting and well enough to transfer back to medicine floor on 2/29/24. Currently working on balancing volume status with current TPN plan. RT continue to assess and document details of AVAPS to determine if patient is being underventilated. Tunneled CVC removed on 3/7 without complications.    Changes today:   - PCO2 80 after 5 hours biPAP, discussed with RT regarding tidal volumes - they report TV within 300s overnight  - DVT US negative  - Appreciate transplant pulm assistance with  hypercarbia   - sniff test ordered to assess diaphragm   - TPN to 14 hours, 12 tomorrow  - discharge planning: Need to determine if HD 4x a week available, and if able to get AVAPS at home    #Severe respiratory acidosis, improved on BiPAP  #Acute hypoxic and hypercarbic respiratory failure  #S/P Lung transplant 2022 c/b right hemidiaphragm palsy  #Recurrent pneumonia, resolved  Patient received a bilateral lung transplant 6/28/22 for COPD. Was admitted 2/10 to address malnutrition and admitted to ICU on 2/17 with hypoxic hypercarbic respiratory failure. Intubated on 2/18 AM  due to worsening oxygen requirements, likely due to pulmonary edema given hx of ESRD requiring HD vs infection given hx of lung transplant, immunosuppressed status, and recurrent pneumonias. Extubated on 2/19 without complications. Does have slowly rising pCO2 on VBG - HFNC did not seem to improve elevated pCO2 and instead seemed to potentially cause worsening, possibly due to depressed respiratory drive from high O2. Patient initially was adamant that she would not tolerate BiPAP, however was agreeable to trial on 2/27 PM. Despite intermittent BiPAP overnight, VBG was worse and patient transferred to ICU on 2/28 AM. RT adjusted BiPAP settings while in ICU to AVAPS with improvement in mental status and VBG, and patient transferred back to medicine floor on 2/29. ICU team additionally added theophylline and thyroid replacement as both can help with diaphragmatic/respiratory muscle weakness in setting of COPD and malnutrition. MRI negative for central problems with respiratory drive.    - neurology consulted to assist with evaluation for possible central etiology of hypercapnic respiratory failure   - MRI negative, no central concern  - PRN hypertonic saline inhaler with albuterol nebs  - Transplant pulmonology following, recs appreciated  - PTA immunosuppressive agent  >Prednisone 5 mg every morning, 2.5 mg every afternoon; tacrolimus 4 mg every  morning, 3.5mg every afternoon  > Monitor tacro levels, goal 6-8  > -overnight oximetry study suggestive of O2 vs CPAP need, as did require up to 2LPM overnight to prevent hypoxia  > Try to minimize O2 to preserve respiratory drive, will give IVIG for DSA+   - avoid HFNC, maintain minimum oxygen to keep SaO2 >85%   - continue BiPAP when sleeping (overnight and during naps)  - MIP/MEP testing completed  - CXR 2-view on 3/4 w/ pleural effusions due to vol status  - PTA dapsone MWF for PJP prophylaxis  - completed course of zosyn for empiric HAP course (2/17 - 2-23)  - Initial decompensation likely from opioids - limit medications that would depress respiration   - d/c'd theophylline 3/3/24 due to difficulty attaining therapeutic levels   - continue thyroid function as below  - awaiting metabolic CART study results  - may need BiPAP at home as patient reports not having one. Sleep clinic referral at discharge.   - Contacted lung transplant social work team (Mana) in regards to patient progression. Pulmonary team concerned she is not stable for discharge at this time due to trending BG CO2 levels and not cooperating with BIPAP.     Antibiotics:  Vancomycin (2/17 - 2/17)  Zosyn (2/17 - 2/23)  Dapsone MWF, PJP ppx   Micafungin (2/18- 2/21)     Immunosuppression  Tacrolimus  Prednisone     #Severe malnutrition   #FTT   #Hypoalbuminemia  #Gastroparesis, small bowel dysmotility  #S/P PEG/J with intolerance of enteral nutrition  # Chronic osmotic diarrhea/SIBO s/p Rifaximin > improving     Patient with gastroparesis (presumed due to vagal injury) and small bowel dysmotility complicated by unintentional 40lb weight loss over the past year and now severe malnutrition. Previously intolerant to oral food intake due to nausea. Was initially admitted for portacath and TPN initiation since 2/13. After extubation has been able to tolerate feeds without n/v from 2/20. Electrolytes trending towards baseline today.  Per GI, next steps  for workup for malnutrition would include CT enterography to evaluate for anatomic abnormalities contributing to malnutrition vs possible treatment for SIBO (though patient has had multiple courses of treatment in the past with no long term improvement). Patient couldn't tolerate the oral load for CT enterography on 2/21, so will defer this until she is able to tolerate greater PO load. On 2/23 , again discussed with patient the need of small bowel motility study if not improving outpatient through [place]. No ongoing concerns for SIBO on 2/23 as patient is passing multiple episodes of stools and gas with no abdominal pain or distention. C-diff test negative on 2/21. Per RD, patient tolerating >300 kcal of intake PO currently, so stopped trickle Tube feeds and continuing with PO and TPN for nutrition.   - Regular diet + increased TPN +  no further tube feeds per RD & transplant pulm discussion               - Nephrology: limit fluid < 1.5 L per day for TPN ( chart vol of TPN in the I/O). Renal team okay with midline on RUE                - RD concerned pt is not absorbing any oral intake and they will be monitoring Bowel function, I/O and, PO/TF/TPN intake.               -  stopped TF as PO intake sufficient for preventing gut atrophy  - CT enterography with contrast- Pt not able to tolerate oral contrast load of 1500 ml on 2/21; no ongoing concerns for SIBO, would need small bowel motility study if not improving outpatient through [place]    #Acute on chronic anemia 2/2 ESRD  Hgb have been stable 7-8s, with no acute signs of bleeding, acute drop 2/29 from 8.2 > 6.6 after two rechecks, no overt signs of bleeding noted, patient reports some abdominal pain but otherwise physical exam unremarkable.  LUE US negative for DVT 2/18.    - continue epogen per nephrology with dialysis  - venofer 50 mcg qweek with dialysis  - type and screen performed 2/29, 1 unit pRBCs ordered and transfused    #ESRD on HD M/T/Th/Sat  #Hypervolemic  hyponatremia  #Anion gap metabolic acidosis - resolved  Patient is ESRD on T/Th/Sat HD as outpt, Hgb stabilizing. HD tolerating well. Continuing monitoring electrolytes daily. Anion gap normal since 2/21. Will continue to monitor daily labs/ABG  for refeeding syndrome and acidosis.   - Continue Venofer injections    - CBC and CMP daily  - Continue epogen dose 8000 units as per nephrology  - Strict I/Os  - HD M/T/TH/Sat per nephrology given hypervolemia; Plan for 2hr run on M; 3hr on T/Th/S.   - K phos 250 mg bid on 3/4    # Elevated TSH and low T4 and T3  Patient with new low T4 at 0.64 and TSH at 6.5, concerning for new hypothyroidism vs sick thyroid syndrome. TSH with appropriate response, more consistent with elevation in the setting of acute illness. ICU team on 2/28 recommended initiation of treatment for possible hypothyroid as this can improve respiratory muscle function in the setting of weakness and malnutrition.   - continue liothyronine and levothyroxine per ICU team recommendations  - repeat thyroid function studies planned for 3/7/24; adjust dosing as needed    #Left upper extremity unilateral edema, improving   #Bilateral pedal and ankle edema, improving  Edema due to third spacing due to hypoalbuminemia vs HF. BNP >92336 at admission, Echo on 2/18 shows EF of 55-60% with IVC of <2.1 and collapsing >50% with sniff, normal RA pressure, no significant valvular abnormalities; Left upper extremity swelling and pitting lower extremity edema likely due to hypoalbuminemia improved today. Upper limb duplex USG on 2/18 negative for DVT. Wt went from 43.4 kg on admission to 47.8 kg. She is urinating but cannot chart as she usually urinates with BM. She reports trending to baseline with urination. She denies dysuria, retention symptoms. USG left arm on 2/21 shows steel physiology and Vascular Surgery consulted. Vascular surgery has only minor concerns for steal symptoms and given that the AVF is working well,  they are okay with outpatient fistulograms/ venoplasty with wrist brachial index and PPG's, unless new concerns arise. LUE and LE edema significantly decreased since yesterdays HD. No new tingling/ numbness noticed.  - Continue daily weights  - Strict I/Os  - Elevation and wrapping of only lower legs as needed and increased UF per nephrology for volume management; no wrapping of Left upper extremity with dialysis fistula  - lymphedema consulted for BLE edema  -HD as noted above      #Steal physiology of LUE dialysis access fistula  LUE arterial US obtained 2/21 with concern for LUE edema. US of fistula demonstrated steal physiology. Discussed with nephrology, and vascular surgery consulted on 2/22. Vascular surgery has only minor concerns for steal symptoms and given that the AVF is working well, they are okay with outpatient fistulograms/ venoplasty with wrist brachial index and PPG's, unless new concerns arise.  - plan for outpatient workup as above; nephrology in agreement with outpatient workup.     #Facial and neck bruising  #Pain  Reports soreness in her neck from lying in bed. No soreness in the buttocks/ back. Patient has multiple bruises over her arms and face which is attributed to previous falls. No new bruising reported today. Patients reports her headaches are improving with tylenol. Using heating pads and lidocaine patch for body pains. Couple of small skin tears noted by the Rn's. Skin care done accordingly.  - Tylenol Q4  - Lidocaine patch prn  - Heating pads prn  - Diligent skin cares    #HTN  #A-fib, resolved  Noted atrial fibrillation on arrival with HR elevated at 150, not sustained for > 10 minutes and otherwise hemodynamically stable. Rates stable in the 80's. BP normalizing since 2/22.  - PTA metoprolol 25mg BID - holding for now with lower BP with increased UF per nephrology, resume if becoming more hypertensive  - Continuous telemetry     # Encephalopathy secondary to hypercarbia -  resolved  Patient was progressively more lethargic on 2/17 during admission in [facility]. Etiology likely secondary to hypercarbia in context of respiratory failure as patient was noted to have high CO2s concomitant with lethargy. Though receiving oxycodone and fentanyl, lethargy was only partially alleviated by narcan. LFTs and ammonia wnl with low suspicion of hepatic encephalopathy. BUN wnl with low suspicion for uremic encephalopathy. Head CT on 2/18 was negative with low suspicion of acute intracranial pathology. Patient is awake, alert, oriented and following commands since 2/20.     # Right hip fracture s/p ORIF (December 2023)   - PT/OT    # GERD  - PTA PPI    # Hx EBV viremia   # Hypogammaglobulinemia  # Chronic immunosuppression 2/2 lung transplant  Patient has been afebrile and has not had leukocytosis however she is under immunosuppression. Given acute respiratory failure and hx of recurrent pneumonias, she was started on empiric abx for concerns over new pneumonia. RVP and cultures no growth to date. Last day of empirical treatment for HAP.  - EBV 27K (decreased compared to prior)     # RLE edema and pain - U/S DVT without DVT     Lines/tubes/drains:  - PIV x2  - PEG/J  - HD AV fistula, left arm   - PICC for TPN        Diet: Renal Diet (dialysis)  parenteral nutrition - ADULT compounded formula CYCLE  parenteral nutrition - ADULT compounded formula CYCLE    DVT Prophylaxis: resume subQ heparin  Dong Catheter: Not present  Fluids: TPN and PO  Lines: PRESENT      PICC 03/04/24 Double Lumen Right Basilic TPN. PICC okay to use.-Site Assessment: WDL  Hemodialysis Vascular Access Arteriovenous graft Superior Arm-Site Assessment: WDL      Cardiac Monitoring: None  Code Status: Full Code      Clinically Significant Risk Factors              # Hypoalbuminemia: Lowest albumin = 2.6 g/dL at 2/18/2024  5:13 AM, will monitor as appropriate             # Severe Malnutrition: based on nutrition assessment       # Financial/Environmental Concerns: none         Disposition Plan         Jennifer Mendoza MD  [facility]  Securely message with [employer] (more info)  Text page via [email or web address] Paging/Directory   See signed in provider for up to date coverage information  ______________________________________________________________________    Interval History   No acute events overnight. She is doing okay today. Frustrated and wants to go home. We discussed that her CO2 levels have been rising. She will need BiPAP in order to help. However it is understandably confusing that even on biPAP they have been high - so may need bipap setting adjustments.     Physical Exam   Vital Signs: Temp: 98.6  F (37  C) Temp src: Oral BP: 138/67 Pulse: 83   Resp: 16 SpO2: 97 % O2 Device: Nasal cannula Oxygen Delivery: 2 LPM  Weight: 101 lbs 9.6 oz  General: thin, laying in bed comfortably, no acute distress this morning. Very pleasant  HEENT: bruising on the right face around right orbit with hematoma and right neck, improved  Pulm/Resp: breathing comfortably on NC, CTAB   CV: normal rate, regular rhythm   Ext: LLE edema R>L and tenderness to palpation   Neuro: alert and following commands, answering questions clearly and easily, moving all extremities.    Medical Decision Making       Please see A&P for additional details of medical decision making.      Data     I have personally reviewed the following data over the past 24 hrs:    5.8  \   9.0 (L)   / 212     137 99 50.0 (H) /  113 (H)   3.7 30 (H) 2.69 (H) \     ALT: <5 AST: 14 AP: 86 TBILI: 0.2   ALB: 3.3 (L) TOT PROTEIN: 5.6 (L) LIPASE: N/A       Imaging results reviewed over the past 24 hrs:   Recent Results (from the past 24 hour(s))   US Lower Extremity Venous Duplex Right    Narrative    EXAMINATION: DOPPLER VENOUS ULTRASOUND OF THE RIGHT LOWER EXTREMITY,  3/8/2024 11:06 AM     COMPARISON: None.    HISTORY: Swollen and mildly painful distal  right lower extremity,    TECHNIQUE:  Gray-scale evaluation with compression, spectral flow, and  color Doppler assessment of the deep venous system of the right leg  from groin to knee, and then at the ankle.    FINDINGS:    In the right lower extremity, the common femoral, femoral, popliteal  and posterior tibial veins demonstrate normal compressibility and  blood flow.      Impression    IMPRESSION: No evidence of right lower extremity deep venous  thrombosis.    NIKOS JAVED MD         SYSTEM ID:  [id number]

## 2024-03-08 NOTE — PROGRESS NOTES
Care Management Follow Up    Length of Stay (days): 27    Expected Discharge Date:       Concerns to be Addressed: discharge planning, other (see comments) (New TPN)     Patient plan of care discussed at interdisciplinary rounds: Yes    Anticipated Discharge Disposition: Transitional Care, Home Care, Dialysis Services     Anticipated Discharge Services: None  Anticipated Discharge DME: None    Patient/family educated on Medicare website which has current facility and service quality ratings:    Education Provided on the Discharge Plan: Yes  Patient/Family in Agreement with the Plan: yes    Referrals Placed by CM/SW: External Care Coordination  Private pay costs discussed: Not applicable    Additional Information:  Received call yesterday from Maryeimi 1 resident.  Requesting update on discharge planning. Patient very complex discharge on TPN, dialysis requiring AVAPS whenever sleeping and not always tolerating.  PCO2 remains high. This morning consulted with  [facility] admissions to review to see what level or care patient maybe appropriate (LTACH vs SNF) if any level at this time.  She has been reviewed for TCU.  Not medically appropriate as she is being dialyzed 4 times per week. Also unclear she has a skilled need.  TPN, dialysis are chronic issues with no resolution.  Also unclear if her respiratory needs can improve. Will still review for LTACH.     Mana Valdivia [facility]  Lung Transplant   Phone: [phone] Pager: [phone]

## 2024-03-08 NOTE — PLAN OF CARE
Goal Outcome Evaluation:    A & Ox4. VSS. RA. Mild right hip pain. Declined pain med. Up with 1 assist. Had RLE ultrasound. Voiding spontaneous. Loose stool x3. Received imodium x1

## 2024-03-09 ENCOUNTER — APPOINTMENT (OUTPATIENT)
Dept: OCCUPATIONAL THERAPY | Facility: CLINIC | Age: 64
DRG: 640 | End: 2024-03-09
Attending: INTERNAL MEDICINE
Payer: MEDICARE

## 2024-03-09 LAB
ALBUMIN SERPL BCG-MCNC: 3.4 G/DL (ref 3.5–5.2)
ALP SERPL-CCNC: 88 U/L (ref 40–150)
ALT SERPL W P-5'-P-CCNC: <5 U/L (ref 0–50)
ANION GAP SERPL CALCULATED.3IONS-SCNC: 12 MMOL/L (ref 7–15)
AST SERPL W P-5'-P-CCNC: 16 U/L (ref 0–45)
BASE EXCESS BLDV CALC-SCNC: -0.9 MMOL/L (ref -3–3)
BASOPHILS # BLD AUTO: ABNORMAL 10*3/UL
BASOPHILS # BLD MANUAL: 0 10E3/UL (ref 0–0.2)
BASOPHILS NFR BLD AUTO: ABNORMAL %
BASOPHILS NFR BLD MANUAL: 0 %
BILIRUB SERPL-MCNC: 0.2 MG/DL
BUN SERPL-MCNC: 77.7 MG/DL (ref 8–23)
CALCIUM SERPL-MCNC: 8.9 MG/DL (ref 8.8–10.2)
CHLORIDE SERPL-SCNC: 101 MMOL/L (ref 98–107)
CREAT SERPL-MCNC: 3.77 MG/DL (ref 0.51–0.95)
DEPRECATED HCO3 PLAS-SCNC: 25 MMOL/L (ref 22–29)
EGFRCR SERPLBLD CKD-EPI 2021: 13 ML/MIN/1.73M2
EOSINOPHIL # BLD AUTO: ABNORMAL 10*3/UL
EOSINOPHIL # BLD MANUAL: 0.1 10E3/UL (ref 0–0.7)
EOSINOPHIL NFR BLD AUTO: ABNORMAL %
EOSINOPHIL NFR BLD MANUAL: 1 %
ERYTHROCYTE [DISTWIDTH] IN BLOOD BY AUTOMATED COUNT: 21.5 % (ref 10–15)
GLUCOSE BLDC GLUCOMTR-MCNC: 116 MG/DL (ref 70–99)
GLUCOSE SERPL-MCNC: 127 MG/DL (ref 70–99)
HCO3 BLDV-SCNC: 29 MMOL/L (ref 21–28)
HCT VFR BLD AUTO: 32 % (ref 35–47)
HGB BLD-MCNC: 9.5 G/DL (ref 11.7–15.7)
IMM GRANULOCYTES # BLD: ABNORMAL 10*3/UL
IMM GRANULOCYTES NFR BLD: ABNORMAL %
LYMPHOCYTES # BLD AUTO: ABNORMAL 10*3/UL
LYMPHOCYTES # BLD MANUAL: 0.4 10E3/UL (ref 0.8–5.3)
LYMPHOCYTES NFR BLD AUTO: ABNORMAL %
LYMPHOCYTES NFR BLD MANUAL: 6 %
MAGNESIUM SERPL-MCNC: 1.5 MG/DL (ref 1.7–2.3)
MAGNESIUM SERPL-MCNC: 1.9 MG/DL (ref 1.7–2.3)
MCH RBC QN AUTO: 37.1 PG (ref 26.5–33)
MCHC RBC AUTO-ENTMCNC: 29.7 G/DL (ref 31.5–36.5)
MCV RBC AUTO: 125 FL (ref 78–100)
METAMYELOCYTES # BLD MANUAL: 0.1 10E3/UL
METAMYELOCYTES NFR BLD MANUAL: 2 %
MONOCYTES # BLD AUTO: ABNORMAL 10*3/UL
MONOCYTES # BLD MANUAL: 0.4 10E3/UL (ref 0–1.3)
MONOCYTES NFR BLD AUTO: ABNORMAL %
MONOCYTES NFR BLD MANUAL: 6 %
NEUTROPHILS # BLD AUTO: ABNORMAL 10*3/UL
NEUTROPHILS # BLD MANUAL: 5.3 10E3/UL (ref 1.6–8.3)
NEUTROPHILS NFR BLD AUTO: ABNORMAL %
NEUTROPHILS NFR BLD MANUAL: 85 %
NRBC # BLD AUTO: 0 10E3/UL
NRBC BLD AUTO-RTO: 0 /100
O2/TOTAL GAS SETTING VFR VENT: 95 %
OXYHGB MFR BLDV: 77 % (ref 70–75)
PCO2 BLDV: 76 MM HG (ref 40–50)
PH BLDV: 7.18 [PH] (ref 7.32–7.43)
PHOSPHATE SERPL-MCNC: 2.7 MG/DL (ref 2.5–4.5)
PHOSPHATE SERPL-MCNC: 4.8 MG/DL (ref 2.5–4.5)
PLAT MORPH BLD: ABNORMAL
PLATELET # BLD AUTO: 229 10E3/UL (ref 150–450)
PO2 BLDV: 50 MM HG (ref 25–47)
POLYCHROMASIA BLD QL SMEAR: SLIGHT
POTASSIUM SERPL-SCNC: 3.8 MMOL/L (ref 3.4–5.3)
PROT SERPL-MCNC: 5.7 G/DL (ref 6.4–8.3)
RBC # BLD AUTO: 2.56 10E6/UL (ref 3.8–5.2)
RBC MORPH BLD: ABNORMAL
SAO2 % BLDV: 79.7 % (ref 70–75)
SODIUM SERPL-SCNC: 138 MMOL/L (ref 135–145)
VARIANT LYMPHS BLD QL SMEAR: PRESENT
WBC # BLD AUTO: 6.2 10E3/UL (ref 4–11)

## 2024-03-09 PROCEDURE — 258N000003 HC RX IP 258 OP 636: Performed by: INTERNAL MEDICINE

## 2024-03-09 PROCEDURE — 90935 HEMODIALYSIS ONE EVALUATION: CPT

## 2024-03-09 PROCEDURE — 634N000001 HC RX 634: Mod: JZ | Performed by: INTERNAL MEDICINE

## 2024-03-09 PROCEDURE — 97535 SELF CARE MNGMENT TRAINING: CPT | Mod: GO | Performed by: OCCUPATIONAL THERAPIST

## 2024-03-09 PROCEDURE — 84100 ASSAY OF PHOSPHORUS: CPT

## 2024-03-09 PROCEDURE — 36592 COLLECT BLOOD FROM PICC: CPT

## 2024-03-09 PROCEDURE — 250N000013 HC RX MED GY IP 250 OP 250 PS 637

## 2024-03-09 PROCEDURE — 250N000009 HC RX 250

## 2024-03-09 PROCEDURE — 80053 COMPREHEN METABOLIC PANEL: CPT

## 2024-03-09 PROCEDURE — 36415 COLL VENOUS BLD VENIPUNCTURE: CPT

## 2024-03-09 PROCEDURE — 99232 SBSQ HOSP IP/OBS MODERATE 35: CPT | Mod: GC | Performed by: STUDENT IN AN ORGANIZED HEALTH CARE EDUCATION/TRAINING PROGRAM

## 2024-03-09 PROCEDURE — 94640 AIRWAY INHALATION TREATMENT: CPT | Mod: 76

## 2024-03-09 PROCEDURE — 83735 ASSAY OF MAGNESIUM: CPT

## 2024-03-09 PROCEDURE — 99233 SBSQ HOSP IP/OBS HIGH 50: CPT | Mod: 24 | Performed by: STUDENT IN AN ORGANIZED HEALTH CARE EDUCATION/TRAINING PROGRAM

## 2024-03-09 PROCEDURE — 82805 BLOOD GASES W/O2 SATURATION: CPT

## 2024-03-09 PROCEDURE — 90935 HEMODIALYSIS ONE EVALUATION: CPT | Performed by: INTERNAL MEDICINE

## 2024-03-09 PROCEDURE — 120N000002 HC R&B MED SURG/OB UMMC

## 2024-03-09 PROCEDURE — 250N000012 HC RX MED GY IP 250 OP 636 PS 637

## 2024-03-09 PROCEDURE — 85027 COMPLETE CBC AUTOMATED: CPT

## 2024-03-09 PROCEDURE — 250N000012 HC RX MED GY IP 250 OP 636 PS 637: Performed by: STUDENT IN AN ORGANIZED HEALTH CARE EDUCATION/TRAINING PROGRAM

## 2024-03-09 PROCEDURE — 999N000157 HC STATISTIC RCP TIME EA 10 MIN

## 2024-03-09 PROCEDURE — 85007 BL SMEAR W/DIFF WBC COUNT: CPT

## 2024-03-09 PROCEDURE — 250N000011 HC RX IP 250 OP 636

## 2024-03-09 PROCEDURE — 250N000009 HC RX 250: Performed by: STUDENT IN AN ORGANIZED HEALTH CARE EDUCATION/TRAINING PROGRAM

## 2024-03-09 RX ADMIN — PREDNISONE 5 MG: 5 TABLET ORAL at 12:40

## 2024-03-09 RX ADMIN — LIDOCAINE 4% 1 PATCH: 40 PATCH TOPICAL at 20:58

## 2024-03-09 RX ADMIN — PREDNISONE 2.5 MG: 2.5 TABLET ORAL at 20:58

## 2024-03-09 RX ADMIN — Medication 2.5 MCG: at 20:59

## 2024-03-09 RX ADMIN — CALCIUM CARBONATE 600 MG (1,500 MG)-VITAMIN D3 400 UNIT TABLET 1 TABLET: at 17:58

## 2024-03-09 RX ADMIN — TACROLIMUS 3.5 MG: 5 CAPSULE ORAL at 17:59

## 2024-03-09 RX ADMIN — SODIUM CHLORIDE 300 ML: 9 INJECTION, SOLUTION INTRAVENOUS at 08:54

## 2024-03-09 RX ADMIN — LEVOTHYROXINE SODIUM 25 MCG: 0.03 TABLET ORAL at 12:50

## 2024-03-09 RX ADMIN — LEVALBUTEROL HYDROCHLORIDE 1.25 MG: 1.25 SOLUTION RESPIRATORY (INHALATION) at 21:15

## 2024-03-09 RX ADMIN — CYANOCOBALAMIN TAB 500 MCG 500 MCG: 500 TAB at 12:40

## 2024-03-09 RX ADMIN — MAGNESIUM SULFATE HEPTAHYDRATE: 500 INJECTION, SOLUTION INTRAMUSCULAR; INTRAVENOUS at 21:07

## 2024-03-09 RX ADMIN — CALCIUM CARBONATE 600 MG (1,500 MG)-VITAMIN D3 400 UNIT TABLET 1 TABLET: at 12:40

## 2024-03-09 RX ADMIN — HEPARIN SODIUM 5000 UNITS: 5000 INJECTION, SOLUTION INTRAVENOUS; SUBCUTANEOUS at 06:11

## 2024-03-09 RX ADMIN — Medication 40 MG: at 20:58

## 2024-03-09 RX ADMIN — LIDOCAINE AND PRILOCAINE: 25; 25 CREAM TOPICAL at 06:11

## 2024-03-09 RX ADMIN — TACROLIMUS 4 MG: 5 CAPSULE ORAL at 12:41

## 2024-03-09 RX ADMIN — EPOETIN ALFA-EPBX 10000 UNITS: 10000 INJECTION, SOLUTION INTRAVENOUS; SUBCUTANEOUS at 10:52

## 2024-03-09 RX ADMIN — HEPARIN SODIUM 5000 UNITS: 5000 INJECTION, SOLUTION INTRAVENOUS; SUBCUTANEOUS at 17:58

## 2024-03-09 RX ADMIN — Medication 2.5 MCG: at 12:50

## 2024-03-09 RX ADMIN — SODIUM CHLORIDE 250 ML: 9 INJECTION, SOLUTION INTRAVENOUS at 08:52

## 2024-03-09 RX ADMIN — Medication 40 MG: at 12:51

## 2024-03-09 ASSESSMENT — ACTIVITIES OF DAILY LIVING (ADL)
ADLS_ACUITY_SCORE: 37
ADLS_ACUITY_SCORE: 36
ADLS_ACUITY_SCORE: 37
ADLS_ACUITY_SCORE: 36
ADLS_ACUITY_SCORE: 36
ADLS_ACUITY_SCORE: 37
ADLS_ACUITY_SCORE: 36
ADLS_ACUITY_SCORE: 37
ADLS_ACUITY_SCORE: 36
ADLS_ACUITY_SCORE: 37
ADLS_ACUITY_SCORE: 37
ADLS_ACUITY_SCORE: 36
ADLS_ACUITY_SCORE: 37
ADLS_ACUITY_SCORE: 37

## 2024-03-09 NOTE — PLAN OF CARE
Goal Outcome Evaluation:      Plan of Care Reviewed With: patient    Overall Patient Progress: no changeOverall Patient Progress: no change    Outcome Evaluation: A&O, VSS on 3L NC, refusing bipap overnight. TPN running purple lumen. Dialysis this am, lidocaine/prilocaine applied at 0600 per pt req. 0600 levothyroxine held for dialysis.

## 2024-03-09 NOTE — PLAN OF CARE
Goal Outcome Evaluation:   Plan of care reviewed with: patient     Overall patient progress: no change     Outcome evaluation: Assumed cares [id number]. A&Ox4. VSS on 2 L NC. DL PICC both lumens SL. Denies pain & nausea. BM x1. No acute events this shift. Call light within reach. Able to make needs known. Continue with POC

## 2024-03-09 NOTE — PROGRESS NOTES
Date: 3/9/2024  Time: 1234     Data:  Pre Wt:   47.4 kg  Desired Wt:   To be established  Post Wt:  44.4 kg (estimated)  Weight change: - 3 kg  Ultrafiltration - Post Run Net Total Removed (mL):  3000 ml  Vascular Access Status: Graft patent  Dialyzer Rinse:  Light  Total Blood Volume Processed: 64.1 L   Total Dialysis (Treatment) Time:   3 Hrs, patient wanted to run for 3 hours intead of 3.5hrs  Dialysate Bath: K 3, Ca 2.25  Heparin: Heparin: None     Lab:   No      Interventions:  Dialysis done through left AV Fistula using 15 gauge needles.UF set to 3 Liters, accommodating priming and rinse back volumes. , . All  administered per MAR. HD tx set for 3hrs per patient request. Provider notified (Teresa).Treatment ended safely and  blood is rinsed back completely to a jacobo color in HD lines. Decannulation done post HD, hemostasis is achieved in 10 minutes.Pressure dressing is applied, to be removed after 4-6 hours.Report given to Hayder CEE RN sent back to [facility] room in stable condition.     Assessment:  A/O x 4, calm and cooperative, denies pain  Lung sounds clear anterior and lateral BUL, Diminished BLL  Left arm AV Fistula has good thrill and bruit                Plan:    Per Renal team        Brigitte DOUGLASN, RN  Acute Dialysis RN  [facility]

## 2024-03-09 NOTE — PLAN OF CARE
Goal Outcome Evaluation:      Plan of Care Reviewed With: patient    Overall Patient Progress: improvingOverall Patient Progress: improving    Outcome Evaluation: Pt A/Ox4, VSS on uses BIPAP ON. R PICC purple lumen infusing cycled TPN and lipids. Red lumen SL. x3 BM today, PRN imodium in use x2. Pt has scattered abrasions and bruises. BLE swelling, BLE US neg for DVT.  L AV fistula in place. HD scheduled at tomorrow AM, transport to pick pt up around 07:10am. Pt would like PRN lidocaine cream applied around 6am at fistulas site. Family called asking about visitor restrictions for tomorrow. Lots of family to visit with youngest being 6 month old. Writer confirmed with  that we recommend having only 4 visitors at a time, and no restrictions of age for visitors. These visiting recommendations were relayed to pts family and writer also passed along that we used to not allow children under 4yo to visit. Writer passed along that children of all ages can visit, but they may want to consider not having children visit if there vaccines are not up to date. POC is to continue to look for LTACH options since pt requires facility that can arrange for OP HD transport 4x a week (T,Th,S +2hrs on Monday), perform TPN and work with pts AVAPS at night. Pulm following and they feel pt not ready for discharge as of 3/7 as CO2 is still too high (last two values 82 and 80).

## 2024-03-09 NOTE — PROGRESS NOTES
Nephrology  Progress note- hemodialysis visit  03/09/2024     This patient was seen and examined while on hemodialysis.  Professional oversight of the patient's dialysis care, access care and dialysis related co-morbidities were addressed as necessary with the patient and / or staff.   No issues during run but refuses the 3.5 hour run that was ordered, agrees only to 3 hour treatments    Laboratory results and nurses' notes were reviewed.   No changes to management of volume, anemia, BMD, acidosis, or electrolytes.   Diagnosis - ESRD  Continue intermittent hemodialysis T/Th/S with extra UF run Mondays  Emla cream to dialysis access prior to treatment      Claribel Horn MD  [facility]  Department of Medicine  Division of Renal Disease and Hypertension  [facility]  [employer] Web Console

## 2024-03-09 NOTE — PROGRESS NOTES
Pulmonary Medicine  Cystic Fibrosis - Lung Transplant Team  Progress Note  2024     Patient: Sofie Rodriguez  MRN: [id number]  : 1960 (age 63 year old)  Transplant: 2022 (Lung), POD#620  Admission date: 2/10/2024    Assessment & Plan:     Sofie Rodriguez is a 63 year old female with h/o COPD s/p BSLT () with course complicated by post-operative hemidiaphragm palsy, recurrent PNAs, positive DSA, EBV viremia, hypogammaglobulinemia, severe gastroparesis s/p G/J tube placement (22) and pyloric botox (23), GI bleed 2/2 pyloric ulcer, hemobilia s/p ERCP and MRCP, chronic diarrhea, recurrent C diff colitis, ESRD on iHD, recurrent falls with injuries (recent right hip fx s/p ORIF 2023), deconditioning, and FTT.  Admitted 2/10 for initiation of TPN/lipids.  Transferred to ICU  for emergent intubation for hypoxic and hypercapneic respiratory failure with severe respiratory acidosis and encephalopathy.  iHD increased, infectious workup unremarkable. Extubated . Persistent and progressive hypercapnia with severe acidosis, returned to ICU - d/t tenuous respiratory status. Improvement noted with when was able to tolerate NIPPV. Poor toleration with worsening acidosis currently. Discharge pending medical stability (with TPN/lipids and overnight AVAPS), therapy recommending TCU (pt. hesitant).     Today's recommendations:  - AVAPS at night and with naps, poor toleration, trying to encourage compliance  - Would try to repeat MIP and MEP next week  - Daily AM VBG  - Tacro level at goal, next level 3/11  - Sputum cultures ordered if able to collect  - DSA pending from 3/6  - Repeat CMV, Prospera, and EBV ordered 3/18  - TPN/lipids per primary (PICC line in place), anticipate continued need after discharge given GI concerns for negligible enteral nutritional absorption potential  - iHD 4x/week schedule (MTTSa), per nephrology would likely need this schedule while on TPN given volume  reaccumulation  - Would discuss patient again with Palliative service     Acute on chronic hypoxic/hypercapneic respiratory failure:  S/p bilateral sequential lung transplant for COPD:  Right hemidiaphragm palsy:   Hypoventilation, Suspected CARLEE: CT PTA (2/7) with decreased MARLON opacities but new tree in bud RLL opacities.  PFTs unchanged in clinic (but ATS criteria not met), remain significantly below her baseline.  Baseline hypoxia with 2L NC overnight.  Respiratory decompensation with encephalopathy and severe respiratory acidosis on 2/17.  S/p intubation 2/17-2/19.  Repeat CT (2/17) with new patchy consolidative and nodular opacities, GGO primarily in the bases and intralobular septal thickening (concerning for pulmonary edema given recent addition of TPN nutrition, new infection, PTLD, and/or septic emboli.  Bronch with lavage of RML (2/18) with very friable tissue, cutlures only with C. glabrata.  S/p empiric Zosyn (2/17-2/24) and micafungin (2/18-2/21).  Severe respiratory acidosis with hypercapnia persisting with slight improvement with NIPPV treatment, limited by pt. tolerance to pressure and mask (claustrophobia).  Persistent respiratory failure and variable encephalopathy also complicated by diaphragmatic weakness, hypoventilation, and deconditioning d/t malnutrition.  Repeat CT (2/26) with diffuse GG and consolidative opacities >BLL and diffuse interlobular septal thickening.      Intermittent tolerance of NIPPV, some improvement with transition to AVAPS. Neurology consulted 2/27. Transferred to ICU 2/28 given tenuous respiratory status and potential need for reintubation.  Improvement noted with continuous NIPPV with minimal interruptions (sodium bicarb also stopped, likely partially contributing), trial off NIPPV during the day started 2/29. MRI brain (3/1) with moderate leukoaraiosis, no acute intracranial pathology. Currently wearing AVAPS for several hours per night at best, pCO2 70s to low 80s with  pH 7.17-7.24 for past week. Increasing reluctance to wear mask, partially as does not believe helps but also reports it is suffocating.    - AVAPS at night and with naps, difficulty tolerating and intermittently refusing. Have discussed importance of wearing and will encourage her to wear tonight, will try to monitor tidal volumes when getting therapy  - Daily AM VBG  - NIF -40 and  on 3/5, would repeat this week to trend  - SNIF testing performed 3/8, awaiting read  - Sputum cultures ordered if able to collect  - Xopenex BID (2/27)  - Defer ABX at this time  - Sleep clinic eval indicated as OP  - Given patient's feeling of being overwhelmed by current level of medical cares and need for these cares to continue long term would ask Palliative team to visit with patient again this week     Immunosuppression:  ImmuKnow low at 108 on 1/10.  AZA previously stopped 5/2023 d/t low ImmuKnow assay and EBV viremia.  Repeat ImmuKnow (2/27) low at 88, defer dose adjustment (tacro goal already decreased one day prior).  - Tacrolimus 4 mg qAM / 3.5 mg qPM via G tube (decreased 3/5). Goal 6-8 (decreased 2/26 d/t ImmuKnow and concern for infection).  Repeat level of 6.1 on 3/8, next level 3/11.  - Prednisone 5 mg qAM / 2.5 mg qPM     Prophylaxis:   - Dapsone 50 mg q MWF for PJP ppx  - CMV ppx not currently indicated, D+/R+, CMV negative 2/19 (BAL very mildly positive 2/18, likely not clinically significant), repeat CMV ordered 3/18     Positive DSA: PFTs and pulmonary symptoms have remained stable as OP, so AMR treatment deferred given frailty.  Most recent DSA (2/7) with DQB2 mfi 6966 (from 5723 one month prior).  Most recent cell-free DNA Prospera (2/18) mildly elevated at 1.04 (concerning for possible rejection), which was increased from prior level of 0.12 (1/10).  IST increase deferred at that time per Dr. Gong.  S/p IVIG (2/27) for DSA (IgG WNL).  - Repeat DSA pending (3/6)  - Repeat Prospera ordered 3/18      EBV  viremia: CT CAP (2/7) without lymphadenopathy.  Most recent level (2/18) improved to 28k from 96k (2/7)  - Repeat EBV ordered 3/18     Other relevant problems being managed by the primary team:      FTT:  Severe protein calorie malnutrition:  Gastroparesis s/p PEG/J, botox, and G-POEM:  SB hypomotility:  Pyloric ulcer:  Chronic nausea and osmotic diarrhea:  SIBO s/p rifaximin:   Recurrent C diff colitis: Chronic osmotic and infectious diarrhea since transplant with recurrent episodes of C diff.  Notable weight loss (40# in a year) d/t diarrhea, GI dysmotility, and intolerance of enteral feeds (PEG/J tube in place), most recently on elemental formula.  Extensive OP eval and f/u with GI.  S/p port placement for TPN and lipids.  Tolerating modest PO intake (likely absorbing minimal per GI), monitoring for refeeding syndrome.    - PO diet for pleasure only  - TPN/lipids per primary (PICC line in place), anticipate continued need after discharge given GI concerns for negligible enteral nutritional absorption potential, would need long term access  - CT enterography recommended per GI, but unlikely to be able to tolerate the required 1.5 L enteral contrast bolus so deferring for now     ESRD: CT scan (with declining respiratory status) with volume overload secondary to TPN volume, iHD increased from PTA TThSa schedule with unsustained improvement.  Management per nephrology, dialysis via [facility].    - iHD 4x/week schedule (MTTSa), per Nephrology would likely need this schedule while on TPN given volume reaccumulation     We appreciate the excellent care provided by the [place] [id number] team.  Recommendations communicated via this note and in person.  Will continue to follow along closely, please do not hesitate to call with any questions or concerns.     Yulisa Huff MD PhD  Lung Transplant  Pulmonary Critical Care      Subjective & Interval History:     Refused AVAPS overnight. This morning discussed importance  of wearing NIPPV overnight. Patient stated that didn't think was helping and discussed that breathing not safe without it. Patient expressed that not sure could do all this anymore and when asked agree that she felt overwhelmed by the large amount of dialysis and need to wear AVAPS at night. States that the mask feels suffocating. Talked about how we are asking a lot of her right now. Discussed that we know her goal is discharge from the hospital and AVAPS would be part of a safe discharge plan in order to stabilize oxygen and CO2.     Review of Systems:     C: No fever, no chills, no change in weight, no change in appetite  INTEGUMENTARY/SKIN: + Ecchymosis from falls  ENT/MOUTH: No nasal congestion or drainage  RESP: See interval history  CV: No chest pain, no palpitations, + peripheral edema, no orthopnea  GI: + Occ nausea, no vomiting, stable loose stools, no reflux symptoms  : See interval history  MUSCULOSKELETAL: + Shoulder/hip pain  ENDOCRINE: Blood sugars with adequate control  NEURO: No headache, + tingling to hands/feet (at least several weeks but newly reported by pt.)  PSYCHIATRIC: Mood stable    Physical Exam:     All notes, images, and labs from past 24 hours (at minimum) were reviewed.    Vital signs:  Temp: 98  F (36.7  C) Temp src: Oral BP: 137/67 Pulse: 98   Resp: 16 SpO2: 96 % O2 Device: Nasal cannula Oxygen Delivery: 1 LPM   Weight: 46.1 kg (101 lb 9.6 oz)  I/O:     Intake/Output Summary (Last 24 hours) at 3/9/2024 0826  Last data filed at 3/9/2024 0618  Gross per 24 hour   Intake 350 ml   Output --   Net 350 ml     Constitutional: Lying in bed on dialysis, in no apparent distress.   HEENT: Eyes with pink conjunctivae, anicteric.  Oral mucosa moist.  PULM: Mildly diminished air flow bilaterally.  No crackles, no rhonchi, no wheezes.  Non-labored breathing on 1L NC.  CV: Tachycardia.  No murmur, gallop, or rub. Trace BLE edema.   ABD: NABS, soft, nontender, nondistended.  PEG/J tube site  cdi.  MSK: Moves all extremities.  ++ muscle wasting.   NEURO: Sleeping.   SKIN: Warm, dry, diffuse ecchymosis to extremities and right neck.   PSYCH: Mood stable.     Data:     LABS    CMP:   Recent Labs   Lab 03/09/24  0623 03/08/24  1645 03/08/24  0555 03/07/24  1606 03/07/24  0830 03/07/24  0550 03/06/24  1623 03/06/24  0631     --  137  --   --  139  --  139   POTASSIUM 3.8  --  3.7  --   --  4.0  --  3.7   CHLORIDE 101  --  99  --   --  102  --  103   CO2 25  --  30*  --   --  26  --  25   ANIONGAP 12  --  8  --   --  11  --  11   *  --  113*  --  132* 113*  --  112*   BUN 77.7*  --  50.0*  --   --  65.4*  --  37.4*   CR 3.77*  --  2.69*  --   --  3.41*  --  2.47*   GFRESTIMATED 13*  --  19*  --   --  15*  --  22*   ESTUARDO 8.9  --  8.6*  --   --  9.3  --  9.0   MAG 1.9 1.8 1.8 1.7  --  2.3   < > 2.0   PHOS 4.8* 3.7 3.4 2.7  --  5.1*   < > 4.6*   PROTTOTAL 5.7*  --  5.6*  --   --  5.8*  --  5.8*   ALBUMIN 3.4*  --  3.3*  --   --  3.4*  --  3.4*   BILITOTAL 0.2  --  0.2  --   --  0.2  --  0.2   ALKPHOS 88  --  86  --   --  91  --  86   AST 16  --  14  --   --  15  --  14   ALT <5  --  <5  --   --  <5  --  <5    < > = values in this interval not displayed.     CBC:   Recent Labs   Lab 03/09/24  0623 03/08/24  0555 03/07/24  0550 03/06/24  0631   WBC 6.2 5.8 5.2 4.6   RBC 2.56* 2.52* 2.52* 2.58*   HGB 9.5* 9.0* 9.0* 9.4*   HCT 32.0* 31.3* 31.3* 31.9*   * 124* 124* 124*   MCH 37.1* 35.7* 35.7* 36.4*   MCHC 29.7* 28.8* 28.8* 29.5*   RDW 21.5* 21.7* 21.9* 22.2*    212 211 216       INR:   Recent Labs   Lab 03/04/24  0713   INR 0.99       Glucose:   Recent Labs   Lab 03/09/24  0623 03/08/24  0555 03/07/24  0830 03/07/24  0550 03/06/24  0631 03/05/24  0607   * 113* 132* 113* 112* 135*       Blood Gas:   Recent Labs   Lab 03/09/24  0623 03/08/24  0555 03/07/24  1750 03/07/24  0550   PHV 7.18* 7.20*  --  7.17*   PCO2V 76* 80*  --  82*   PO2V 50* 54*  --  59*   HCO3V 29* 31*  --  30*   LINDY  "-0.9 1.2  --  -0.1   O2PER 95 2 1 21       Culture Data No results for input(s): \"CULT\" in the last 168 hours.    Virology Data:   Lab Results   Component Value Date    FLUAH1 Not Detected 02/18/2024    FLUAH3 Not Detected 02/18/2024    [id number] Not Detected 02/18/2024    IFLUB Not Detected 02/18/2024    RSVA Not Detected 02/18/2024    RSVB Not Detected 02/18/2024    PIV1 Not Detected 02/18/2024    PIV2 Not Detected 02/18/2024    PIV3 Not Detected 02/18/2024    HMPV Not Detected 02/18/2024       Historical CMV results (last 3 of prior testing):  Lab Results   Component Value Date    CMVQNT Not Detected 02/19/2024    CMVQNT Not Detected 02/07/2024    CMVQNT Not Detected 01/10/2024     Lab Results   Component Value Date    CMVLOG 3.2 07/12/2023    CMVLOG <2.1 04/19/2023    CMVLOG 3.5 01/25/2023       Urine Studies    Recent Labs   Lab Test 02/18/24  0222 05/18/23  0627   URINEPH 7.5* 5.0   NITRITE Negative Negative   LEUKEST Trace* Moderate*   WBCU 66* 21*       Most Recent [facility] Pulmonary Function Testing (FVC/FEV1 only)  FVC-Pre   Date Value Ref Range Status   02/07/2024 1.19 L    01/10/2024 1.12 L    08/29/2023 1.48 L    07/25/2023 1.55 L      FVC-%Pred-Pre   Date Value Ref Range Status   02/07/2024 42 %    01/10/2024 39 %    08/29/2023 53 %    07/25/2023 55 %      FEV1-Pre   Date Value Ref Range Status   02/07/2024 1.13 L    01/10/2024 1.10 L    08/29/2023 1.43 L    07/25/2023 1.54 L      FEV1-%Pred-Pre   Date Value Ref Range Status   02/07/2024 51 %    01/10/2024 49 %    08/29/2023 64 %    07/25/2023 69 %        IMAGING    Recent Results (from the past 48 hour(s))   IR CVC Tunnel Removal Right    Narrative    PROCEDURE: Tunneled central venous catheter removal    Procedural Personnel  Attending physician(s): Beverly Paul M.D. I, Dr. Manpreet Paul performed the  entirety of this procedure without assistance.    Fellow physician(s): None  Resident physician(s): None  Advanced practice " provider(s): None    Pre-procedure diagnosis: Right lower extremity single lumen tunneled  line is no longer needed  Post-procedure diagnosis: Same  Indication: Catheter no longer needed  Additional clinical history: None    Complications: No immediate complications.      Impression    IMPRESSION:    Removal of right-sided tunneled central venous catheter.    Plan:     Please re-consult interventional radiology if new catheter placement  is desired.  _______________________________________________________________    PROCEDURE SUMMARY:  - Tunneled central venous catheter removal  - Additional procedure(s): None    PROCEDURE DETAILS:    Pre-procedure  Consent: Informed consent for the procedure including risks, benefits  and alternatives was obtained and time-out was performed prior to the  procedure.  Preparation: The site was prepared and draped using maximal sterile  barrier technique including cutaneous antisepsis.    Anesthesia/sedation  Level of anesthesia/sedation: No sedation    Catheter removal  Local anesthesia was administered. The catheter was removed with a  combination of traction and blunt dissection. Fluoroscopic images were  not obtained to document removal.    Closure  Hemostasis was achieved with manual compression. Sterile dressing(s)  applied.    Additional Details  Additional description of procedure: None  Device used: None  Equipment details: None  Specimens removed: Tunneled central venous catheter.   Estimated blood loss (mL): Less than 10  Standardized report: [email or web address]    Attestation  Signer name: MARK SOSA  I attest that I was present for the entire procedure. I reviewed the  stored images and agree with the report as written.    MARK SOSA         SYSTEM ID:  [id number]

## 2024-03-09 NOTE — PLAN OF CARE
"Blood pressure 129/70, pulse 91, temperature 98.3  F (36.8  C), temperature source Oral, resp. rate 16, height 1.57 m (5' 1.81\"), weight 46.1 kg (101 lb 9.6 oz), SpO2 97%, not currently breastfeeding.  Goal Outcome Evaluation:  Plan of Care Reviewed With :Patient  Overall patient Progress:No change  A&Ox4, sats on 2L via NC 97%,LS diminished,BS+,no BM,no urinary output ,pt. had HD today tolerated well,GJ tube clumped,medication given after dialysis via JT.Family at bedside.Will continue to monitor.                        "

## 2024-03-10 LAB
ALBUMIN SERPL BCG-MCNC: 3.2 G/DL (ref 3.5–5.2)
ALP SERPL-CCNC: 92 U/L (ref 40–150)
ALT SERPL W P-5'-P-CCNC: <5 U/L (ref 0–50)
ANION GAP SERPL CALCULATED.3IONS-SCNC: 7 MMOL/L (ref 7–15)
AST SERPL W P-5'-P-CCNC: 23 U/L (ref 0–45)
BASE EXCESS BLDV CALC-SCNC: 2.3 MMOL/L (ref -3–3)
BASOPHILS # BLD AUTO: ABNORMAL 10*3/UL
BASOPHILS # BLD MANUAL: 0 10E3/UL (ref 0–0.2)
BASOPHILS NFR BLD AUTO: ABNORMAL %
BASOPHILS NFR BLD MANUAL: 0 %
BILIRUB SERPL-MCNC: 0.2 MG/DL
BUN SERPL-MCNC: 48.2 MG/DL (ref 8–23)
CALCIUM SERPL-MCNC: 8.3 MG/DL (ref 8.8–10.2)
CHLORIDE SERPL-SCNC: 101 MMOL/L (ref 98–107)
CREAT SERPL-MCNC: 2.68 MG/DL (ref 0.51–0.95)
DEPRECATED HCO3 PLAS-SCNC: 30 MMOL/L (ref 22–29)
EGFRCR SERPLBLD CKD-EPI 2021: 20 ML/MIN/1.73M2
EOSINOPHIL # BLD AUTO: ABNORMAL 10*3/UL
EOSINOPHIL # BLD MANUAL: 0.3 10E3/UL (ref 0–0.7)
EOSINOPHIL NFR BLD AUTO: ABNORMAL %
EOSINOPHIL NFR BLD MANUAL: 5 %
ERYTHROCYTE [DISTWIDTH] IN BLOOD BY AUTOMATED COUNT: 21 % (ref 10–15)
GLUCOSE SERPL-MCNC: 155 MG/DL (ref 70–99)
HCO3 BLDV-SCNC: 32 MMOL/L (ref 21–28)
HCT VFR BLD AUTO: 33.1 % (ref 35–47)
HGB BLD-MCNC: 9.7 G/DL (ref 11.7–15.7)
IMM GRANULOCYTES # BLD: ABNORMAL 10*3/UL
IMM GRANULOCYTES NFR BLD: ABNORMAL %
LYMPHOCYTES # BLD AUTO: ABNORMAL 10*3/UL
LYMPHOCYTES # BLD MANUAL: 0.8 10E3/UL (ref 0.8–5.3)
LYMPHOCYTES NFR BLD AUTO: ABNORMAL %
LYMPHOCYTES NFR BLD MANUAL: 12 %
MAGNESIUM SERPL-MCNC: 1.7 MG/DL (ref 1.7–2.3)
MCH RBC QN AUTO: 36.1 PG (ref 26.5–33)
MCHC RBC AUTO-ENTMCNC: 29.3 G/DL (ref 31.5–36.5)
MCV RBC AUTO: 123 FL (ref 78–100)
MONOCYTES # BLD AUTO: ABNORMAL 10*3/UL
MONOCYTES # BLD MANUAL: 0.4 10E3/UL (ref 0–1.3)
MONOCYTES NFR BLD AUTO: ABNORMAL %
MONOCYTES NFR BLD MANUAL: 7 %
NEUTROPHILS # BLD AUTO: ABNORMAL 10*3/UL
NEUTROPHILS # BLD MANUAL: 4.8 10E3/UL (ref 1.6–8.3)
NEUTROPHILS NFR BLD AUTO: ABNORMAL %
NEUTROPHILS NFR BLD MANUAL: 76 %
NRBC # BLD AUTO: 0 10E3/UL
NRBC BLD AUTO-RTO: 0 /100
O2/TOTAL GAS SETTING VFR VENT: 0 %
OXYHGB MFR BLDV: 80 % (ref 70–75)
PCO2 BLDV: 82 MM HG (ref 40–50)
PH BLDV: 7.2 [PH] (ref 7.32–7.43)
PHOSPHATE SERPL-MCNC: 3.9 MG/DL (ref 2.5–4.5)
PLAT MORPH BLD: ABNORMAL
PLATELET # BLD AUTO: 217 10E3/UL (ref 150–450)
PO2 BLDV: 53 MM HG (ref 25–47)
POLYCHROMASIA BLD QL SMEAR: SLIGHT
POTASSIUM SERPL-SCNC: 3.9 MMOL/L (ref 3.4–5.3)
PROT SERPL-MCNC: 5.5 G/DL (ref 6.4–8.3)
RBC # BLD AUTO: 2.69 10E6/UL (ref 3.8–5.2)
RBC MORPH BLD: ABNORMAL
SAO2 % BLDV: 82.3 % (ref 70–75)
SODIUM SERPL-SCNC: 138 MMOL/L (ref 135–145)
WBC # BLD AUTO: 6.3 10E3/UL (ref 4–11)

## 2024-03-10 PROCEDURE — 84100 ASSAY OF PHOSPHORUS: CPT

## 2024-03-10 PROCEDURE — 250N000013 HC RX MED GY IP 250 OP 250 PS 637

## 2024-03-10 PROCEDURE — 36592 COLLECT BLOOD FROM PICC: CPT

## 2024-03-10 PROCEDURE — 250N000009 HC RX 250: Performed by: STUDENT IN AN ORGANIZED HEALTH CARE EDUCATION/TRAINING PROGRAM

## 2024-03-10 PROCEDURE — 250N000011 HC RX IP 250 OP 636

## 2024-03-10 PROCEDURE — 80053 COMPREHEN METABOLIC PANEL: CPT

## 2024-03-10 PROCEDURE — 99232 SBSQ HOSP IP/OBS MODERATE 35: CPT | Mod: GC | Performed by: STUDENT IN AN ORGANIZED HEALTH CARE EDUCATION/TRAINING PROGRAM

## 2024-03-10 PROCEDURE — 250N000012 HC RX MED GY IP 250 OP 636 PS 637: Performed by: STUDENT IN AN ORGANIZED HEALTH CARE EDUCATION/TRAINING PROGRAM

## 2024-03-10 PROCEDURE — 85007 BL SMEAR W/DIFF WBC COUNT: CPT

## 2024-03-10 PROCEDURE — 36415 COLL VENOUS BLD VENIPUNCTURE: CPT

## 2024-03-10 PROCEDURE — 83735 ASSAY OF MAGNESIUM: CPT

## 2024-03-10 PROCEDURE — 94640 AIRWAY INHALATION TREATMENT: CPT

## 2024-03-10 PROCEDURE — 999N000157 HC STATISTIC RCP TIME EA 10 MIN

## 2024-03-10 PROCEDURE — 82805 BLOOD GASES W/O2 SATURATION: CPT

## 2024-03-10 PROCEDURE — 250N000012 HC RX MED GY IP 250 OP 636 PS 637

## 2024-03-10 PROCEDURE — 120N000002 HC R&B MED SURG/OB UMMC

## 2024-03-10 PROCEDURE — 85027 COMPLETE CBC AUTOMATED: CPT

## 2024-03-10 PROCEDURE — 94150 VITAL CAPACITY TEST: CPT

## 2024-03-10 PROCEDURE — 250N000009 HC RX 250

## 2024-03-10 RX ADMIN — LEVOTHYROXINE SODIUM 25 MCG: 0.03 TABLET ORAL at 06:33

## 2024-03-10 RX ADMIN — Medication 2.5 MCG: at 20:36

## 2024-03-10 RX ADMIN — TACROLIMUS 4 MG: 5 CAPSULE ORAL at 08:52

## 2024-03-10 RX ADMIN — MAGNESIUM SULFATE HEPTAHYDRATE: 500 INJECTION, SOLUTION INTRAMUSCULAR; INTRAVENOUS at 20:37

## 2024-03-10 RX ADMIN — CALCIUM CARBONATE 600 MG (1,500 MG)-VITAMIN D3 400 UNIT TABLET 1 TABLET: at 11:37

## 2024-03-10 RX ADMIN — CYANOCOBALAMIN TAB 500 MCG 500 MCG: 500 TAB at 08:51

## 2024-03-10 RX ADMIN — LOPERAMIDE HYDROCHLORIDE 2 MG: 2 CAPSULE ORAL at 20:12

## 2024-03-10 RX ADMIN — HEPARIN SODIUM 5000 UNITS: 5000 INJECTION, SOLUTION INTRAVENOUS; SUBCUTANEOUS at 06:33

## 2024-03-10 RX ADMIN — Medication 2.5 MCG: at 08:51

## 2024-03-10 RX ADMIN — LIDOCAINE 4% 1 PATCH: 40 PATCH TOPICAL at 20:36

## 2024-03-10 RX ADMIN — CALCIUM CARBONATE 600 MG (1,500 MG)-VITAMIN D3 400 UNIT TABLET 1 TABLET: at 19:03

## 2024-03-10 RX ADMIN — Medication 40 MG: at 20:35

## 2024-03-10 RX ADMIN — PREDNISONE 5 MG: 5 TABLET ORAL at 08:52

## 2024-03-10 RX ADMIN — LOPERAMIDE HYDROCHLORIDE 2 MG: 2 CAPSULE ORAL at 11:43

## 2024-03-10 RX ADMIN — CALCIUM CARBONATE 600 MG (1,500 MG)-VITAMIN D3 400 UNIT TABLET 1 TABLET: at 08:52

## 2024-03-10 RX ADMIN — HEPARIN SODIUM 5000 UNITS: 5000 INJECTION, SOLUTION INTRAVENOUS; SUBCUTANEOUS at 19:09

## 2024-03-10 RX ADMIN — Medication 40 MG: at 08:52

## 2024-03-10 RX ADMIN — TACROLIMUS 3.5 MG: 5 CAPSULE ORAL at 19:04

## 2024-03-10 RX ADMIN — LEVALBUTEROL HYDROCHLORIDE 1.25 MG: 1.25 SOLUTION RESPIRATORY (INHALATION) at 08:56

## 2024-03-10 RX ADMIN — PREDNISONE 2.5 MG: 2.5 TABLET ORAL at 20:36

## 2024-03-10 ASSESSMENT — ACTIVITIES OF DAILY LIVING (ADL)
ADLS_ACUITY_SCORE: 36

## 2024-03-10 NOTE — PLAN OF CARE
"/63   Pulse 92   Temp 98.7  F (37.1  C) (Oral)   Resp 15   Ht 1.57 m (5' 1.81\")   Wt 46.1 kg (101 lb 9.6 oz)   SpO2 96%   BMI 18.70 kg/m      Cares from: 0700 - 1930    VS & Pain: hypertensive (baseline). Denies pain   Neuro: AxO x4  Respiratory: dyspnea on exertion. On 2L NC   Cardiac: no acute distress noted   Peripheral neurovascular: tingling BLE- baseline per pt   GI/: + bowel sound. BM x2, imodium given per pt request. Pt on hemodialysis   Nutrition: renal diet. Good appetite for breakfast & lunch. On cycled TPN overnight   Skin: no new concerns noted   Musculoskeletal: generalized weakness   Lines: PICC- DL- SL   Activity: SBA with gait belt & walker   Events this shift: pt ambulated in the hallway     Plan: continue plan of care     Goal Outcome Evaluation:  Plan of Care Reviewed With: patient  Overall Patient Progress: no change      "

## 2024-03-10 NOTE — PROGRESS NOTES
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 On 2/17/24 she was transferred to ICU for worsening mental status and acute hypoxic and hypercarbic respiratory failure inspite of BiPAP requiring intubation. She was suspected to have PNA and started on antibiotics. Extubated on 2/19 without complications and tolerated iHD on 2/20, and tolerated PO regular diet in addition to TPN. Developed progressively worsening respiratory acidosis and hypercarbia, and was re-admitted to ICU on 2/28/24 for close monitoring of intermittent BiPAP and possible intubation, however she improved on AVAPS setting and well enough to transfer back to medicine floor on 2/29/24. Currently working on balancing volume status with current TPN plan. RT continue to assess and document details of AVAPS to determine if patient is being underventilated. Tunneled CVC removed on 3/7 without complications.    Changes today:   - refused BiPAP overnight; pCO2 76  - Dialysis today  - Plan for Pall care to see pt on 3/11 re: GOC  -MIP ordered for 3/11  -TPN 12hrs  - discharge planning: LTACH w/ HD 4x a week +  TPN + BiPAP     #Severe respiratory acidosis, improved on BiPAP  #Acute hypoxic and hypercarbic respiratory failure  #S/P Lung transplant 2022 c/b right hemidiaphragm palsy  #Recurrent pneumonia, resolved  Patient received a bilateral lung transplant 6/28/22 for COPD. Was admitted 2/10 to address malnutrition and admitted to ICU on 2/17 with hypoxic hypercarbic respiratory failure. Intubated on 2/18 AM  due to worsening oxygen requirements, likely due to pulmonary edema given hx of ESRD requiring HD vs infection given hx of lung transplant, immunosuppressed status, and recurrent pneumonias. Extubated on 2/19 without complications. Does have slowly rising pCO2 on VBG - HFNC did not seem to improve elevated pCO2 and instead seemed to potentially cause worsening, possibly due to depressed respiratory drive from high O2. Patient initially was adamant that she would not tolerate BiPAP, however was agreeable to trial on 2/27 PM. Despite intermittent BiPAP overnight, VBG was worse and patient transferred to ICU on 2/28 AM. RT adjusted BiPAP settings while in ICU to AVAPS with improvement in mental status and VBG, and patient transferred back to medicine floor on 2/29. ICU team additionally added theophylline and thyroid replacement as both can help with diaphragmatic/respiratory muscle weakness in setting of COPD and malnutrition. MRI negative for central problems with respiratory drive.    - neurology consulted to assist with evaluation for possible central etiology of hypercapnic respiratory failure   - MRI negative, no central concern  - PRN hypertonic saline inhaler with albuterol nebs  - Transplant pulmonology following, recs appreciated  - PTA immunosuppressive agent  >Prednisone 5 mg every morning, 2.5 mg every afternoon; tacrolimus 4 mg every morning, 3.5mg every afternoon  > Monitor tacro levels, goal 6-8  > -overnight oximetry study suggestive of O2 vs CPAP need, as did require up to 2LPM overnight to prevent  hypoxia  > Try to minimize O2 to preserve respiratory drive, will give IVIG for DSA+   - avoid HFNC, maintain minimum oxygen to keep SaO2 >85%   - continue AVAPS when sleeping (overnight and during naps)  - MIP/MEP testing again next week (ordered 3/11)  - Discuss GOC w/ Pall Care on 3/11  - PTA dapsone MWF for PJP prophylaxis  - completed course of zosyn for empiric HAP course (2/17 - 2-23)  - Initial decompensation likely from opioids - limit medications that would depress respiration   - d/c'd theophylline 3/3/24 due to difficulty attaining therapeutic levels   - continue thyroid function as below  - awaiting metabolic CART study results  - may need BiPAP at home as patient reports not having one. Sleep clinic referral at discharge.   - Contacted lung transplant social work team ([facility]) in regards to patient progression. Pulmonary team concerned she is not stable for discharge at this time due to trending BG CO2 levels and not cooperating with BIPAP.     Antibiotics:  Vancomycin (2/17 - 2/17)  Zosyn (2/17 - 2/23)  Dapsone MWF, PJP ppx   Micafungin (2/18- 2/21)     Immunosuppression  Tacrolimus  Prednisone     #Severe malnutrition   #FTT   #Hypoalbuminemia  #Gastroparesis, small bowel dysmotility  #S/P PEG/J with intolerance of enteral nutrition  # Chronic osmotic diarrhea/SIBO s/p Rifaximin > improving     Patient with gastroparesis (presumed due to vagal injury) and small bowel dysmotility complicated by unintentional 40lb weight loss over the past year and now severe malnutrition. Previously intolerant to oral food intake due to nausea. Was initially admitted for portacath and TPN initiation since 2/13. After extubation has been able to tolerate feeds without n/v from 2/20. Electrolytes trending towards baseline today.  Per GI, next steps for workup for malnutrition would include CT enterography to evaluate for anatomic abnormalities contributing to malnutrition vs possible treatment for SIBO (though patient  has had multiple courses of treatment in the past with no long term improvement). Patient couldn't tolerate the oral load for CT enterography on 2/21, so will defer this until she is able to tolerate greater PO load. On 2/23 , again discussed with patient the need of small bowel motility study if not improving outpatient through [place]. No ongoing concerns for SIBO on 2/23 as patient is passing multiple episodes of stools and gas with no abdominal pain or distention. C-diff test negative on 2/21. Per RD, patient tolerating >300 kcal of intake PO currently, so stopped trickle Tube feeds and continuing with PO and TPN for nutrition.   - Regular diet + increased TPN +  no further tube feeds per RD & transplant pulm discussion               - Nephrology: limit fluid < 1.5 L per day for TPN ( chart vol of TPN in the I/O). Renal team okay with midline on RUE                - RD concerned pt is not absorbing any oral intake and they will be monitoring Bowel function, I/O and, PO/TF/TPN intake.               -  stopped TF as PO intake sufficient for preventing gut atrophy  - CT enterography with contrast- Pt not able to tolerate oral contrast load of 1500 ml on 2/21; no ongoing concerns for SIBO, would need small bowel motility study if not improving outpatient through [place]    #Acute on chronic anemia 2/2 ESRD  Hgb have been stable 7-8s, with no acute signs of bleeding, acute drop 2/29 from 8.2 > 6.6 after two rechecks, no overt signs of bleeding noted, patient reports some abdominal pain but otherwise physical exam unremarkable.  LUE US negative for DVT 2/18.    - continue epogen per nephrology with dialysis  - venofer 50 mcg qweek with dialysis  - type and screen performed 2/29, 1 unit pRBCs ordered and transfused    #ESRD on HD M/T/Th/Sat  #Hypervolemic hyponatremia  #Anion gap metabolic acidosis - resolved  Patient is ESRD on T/Th/Sat HD as outpt, Hgb stabilizing. HD tolerating well. Continuing monitoring electrolytes  daily. Anion gap normal since 2/21. Will continue to monitor daily labs/ABG  for refeeding syndrome and acidosis.   - Continue Venofer injections    - CBC and CMP daily  - Continue epogen dose 8000 units as per nephrology  - Strict I/Os  - HD T/TH/Sat per nephrology given hypervolemia; Plan for 2hr UF run on M; 3hr on T/Th/S.   - K phos 250 mg bid on 3/4    # Elevated TSH and low T4 and T3  Patient with new low T4 at 0.64 and TSH at 6.5, concerning for new hypothyroidism vs sick thyroid syndrome. TSH with appropriate response, more consistent with elevation in the setting of acute illness. ICU team on 2/28 recommended initiation of treatment for possible hypothyroid as this can improve respiratory muscle function in the setting of weakness and malnutrition.   - continue liothyronine and levothyroxine per ICU team recommendations  - repeat thyroid function studies planned for 3/7/24; adjust dosing as needed    #Left upper extremity unilateral edema, improving   #Bilateral pedal and ankle edema, improving  Edema due to third spacing due to hypoalbuminemia vs HF. BNP >01792 at admission, Echo on 2/18 shows EF of 55-60% with IVC of <2.1 and collapsing >50% with sniff, normal RA pressure, no significant valvular abnormalities; Left upper extremity swelling and pitting lower extremity edema likely due to hypoalbuminemia improved today. Upper limb duplex USG on 2/18 negative for DVT. Wt went from 43.4 kg on admission to 47.8 kg. She is urinating but cannot chart as she usually urinates with BM. She reports trending to baseline with urination. She denies dysuria, retention symptoms. USG left arm on 2/21 shows steel physiology and Vascular Surgery consulted. Vascular surgery has only minor concerns for steal symptoms and given that the AVF is working well, they are okay with outpatient fistulograms/ venoplasty with wrist brachial index and PPG's, unless new concerns arise. LUE and LE edema significantly decreased since  yesterdays HD. No new tingling/ numbness noticed.  - Continue daily weights  - Strict I/Os  - Elevation and wrapping of only lower legs as needed and increased UF per nephrology for volume management; no wrapping of Left upper extremity with dialysis fistula  - lymphedema consulted for BLE edema  -HD as noted above      #Steal physiology of LUE dialysis access fistula  LUE arterial US obtained 2/21 with concern for LUE edema. US of fistula demonstrated steal physiology. Discussed with nephrology, and vascular surgery consulted on 2/22. Vascular surgery has only minor concerns for steal symptoms and given that the AVF is working well, they are okay with outpatient fistulograms/ venoplasty with wrist brachial index and PPG's, unless new concerns arise.  - plan for outpatient workup as above; nephrology in agreement with outpatient workup.     #Facial and neck bruising  #Pain  Reports soreness in her neck from lying in bed. No soreness in the buttocks/ back. Patient has multiple bruises over her arms and face which is attributed to previous falls. No new bruising reported today. Patients reports her headaches are improving with tylenol. Using heating pads and lidocaine patch for body pains. Couple of small skin tears noted by the Rn's. Skin care done accordingly.  - Tylenol Q4  - Lidocaine patch prn  - Heating pads prn  - Diligent skin cares    #HTN  #A-fib, resolved  Noted atrial fibrillation on arrival with HR elevated at 150, not sustained for > 10 minutes and otherwise hemodynamically stable. Rates stable in the 80's. BP normalizing since 2/22.  - PTA metoprolol 25mg BID - holding for now with lower BP with increased UF per nephrology, resume if becoming more hypertensive  - Continuous telemetry     # Encephalopathy secondary to hypercarbia - resolved  Patient was progressively more lethargic on 2/17 during admission in [facility]. Etiology likely secondary to hypercarbia in context of respiratory failure  as patient was noted to have high CO2s concomitant with lethargy. Though receiving oxycodone and fentanyl, lethargy was only partially alleviated by narcan. LFTs and ammonia wnl with low suspicion of hepatic encephalopathy. BUN wnl with low suspicion for uremic encephalopathy. Head CT on 2/18 was negative with low suspicion of acute intracranial pathology. Patient is awake, alert, oriented and following commands since 2/20.     # Right hip fracture s/p ORIF (December 2023)   - PT/OT    # GERD  - PTA PPI    # Hx EBV viremia   # Hypogammaglobulinemia  # Chronic immunosuppression 2/2 lung transplant  Patient has been afebrile and has not had leukocytosis however she is under immunosuppression. Given acute respiratory failure and hx of recurrent pneumonias, she was started on empiric abx for concerns over new pneumonia. RVP and cultures no growth to date. Last day of empirical treatment for HAP.  - EBV 27K (decreased compared to prior)     # RLE edema and pain - U/S DVT without DVT     Lines/tubes/drains:  - PIV x2  - PEG/J  - HD AV fistula, left arm   - PICC for TPN        Diet: Renal Diet (dialysis)  parenteral nutrition - ADULT compounded formula CYCLE  parenteral nutrition - ADULT compounded formula CYCLE    DVT Prophylaxis: resume subQ heparin  Dong Catheter: Not present  Fluids: TPN and PO  Lines: PRESENT      PICC 03/04/24 Double Lumen Right Basilic TPN. PICC okay to use.-Site Assessment: WDL  Hemodialysis Vascular Access Arteriovenous graft Superior Arm-Site Assessment: WDL      Cardiac Monitoring: None  Code Status: Full Code      Clinically Significant Risk Factors            # Hypomagnesemia: Lowest Mg = 1.5 mg/dL in last 2 days, will replace as needed   # Hypoalbuminemia: Lowest albumin = 2.6 g/dL at 2/18/2024  5:13 AM, will monitor as appropriate             # Severe Malnutrition: based on nutrition assessment      # Financial/Environmental Concerns: none         Disposition Plan         Jennifer Mendoza  MD AN[facility]  Securely message with [employer] (more info)  Text page via [employer] Paging/Directory   See signed in provider for up to date coverage information  ______________________________________________________________________    Interval History   No acute events overnight.Refusing BiPAP and also noting that it does not work She does want to work with PT and get stronger but does not know if she has it in her to keep doing this. Will consult [facility] Monday.     Physical Exam   Vital Signs: Temp: 98.8  F (37.1  C) Temp src: Oral BP: (!) 144/64 Pulse: 95   Resp: 16 SpO2: 97 % O2 Device: Nasal cannula Oxygen Delivery: 2 LPM  Weight: 101 lbs 9.6 oz  General: thin, laying in bed comfortably, no acute distress this morning. Very pleasant  HEENT: bruising on the right face around right orbit with hematoma and right neck, improved  Pulm/Resp: breathing comfortably on NC, CTAB   CV: normal rate, regular rhythm   Neuro: alert and following commands, answering questions clearly and easily, moving all extremities.    Medical Decision Making       Please see A&P for additional details of medical decision making.      Data     I have personally reviewed the following data over the past 24 hrs:    6.2  \   9.5 (L)   / 229     138 101 77.7 (H) /  127 (H)   3.8 25 3.77 (H) \     ALT: <5 AST: 16 AP: 88 TBILI: 0.2   ALB: 3.4 (L) TOT PROTEIN: 5.7 (L) LIPASE: N/A       Imaging results reviewed over the past 24 hrs:   No results found for this or any previous visit (from the past 24 hour(s)).

## 2024-03-10 NOTE — PLAN OF CARE
Goal Outcome Evaluation:      Plan of Care Reviewed With: patient    Overall Patient Progress: no changeOverall Patient Progress: no change    Outcome Evaluation: VSS on 2L NC and refusing bipap. TPN @ 19/59/19 over 12 hours. No acute changes.

## 2024-03-10 NOTE — PROGRESS NOTES
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 On 2/17/24 she was transferred to ICU for worsening mental status and acute hypoxic and hypercarbic respiratory failure inspite of BiPAP requiring intubation. She was suspected to have PNA and started on antibiotics. Extubated on 2/19 without complications and tolerated iHD on 2/20, and tolerated PO regular diet in addition to TPN. Developed progressively worsening respiratory acidosis and hypercarbia, and was re-admitted to ICU on 2/28/24 for close monitoring of intermittent BiPAP and possible intubation, however she improved on AVAPS setting and well enough to transfer back to medicine floor on 2/29/24. Currently working on balancing volume status with current TPN plan. RT continue to assess and document details of AVAPS to determine if patient is being underventilated. Tunneled CVC removed on 3/7 without complications.    Changes today:   - refused BiPAP overnight; pCO2 82  - Pall Care consulted for 3/11 re: GOC  -Likely care conference this week  - Spent long time with pt and explaining to daughterJulia via phone,  about current medical issues, and barriers to discharge. They understand the dilemma and will discuss GOC amongst themselves as well.       #Severe respiratory acidosis, improved on BiPAP  #Acute hypoxic and hypercarbic respiratory failure  #S/P Lung transplant 2022 c/b right hemidiaphragm palsy  #Recurrent pneumonia, resolved  Patient received a bilateral lung transplant 6/28/22 for COPD. Was admitted 2/10 to address malnutrition and admitted to ICU on 2/17 with hypoxic hypercarbic respiratory failure. Intubated on 2/18 AM  due to worsening oxygen requirements, likely due to pulmonary edema given hx of ESRD requiring HD vs infection given hx of lung transplant, immunosuppressed status, and recurrent pneumonias. Extubated on 2/19 without complications. Has had issues with rising pCO2 that is responsive to BiPAP, but due to refusal, has required transferred to ICU (last on 2/28 AM). Neurology consulted and MRI r/o central cause problem for respiratory drive. Started on AVAPS with improvement in mental status and VBG, and patient transferred back to medicine floor on 2/29.     - PRN hypertonic saline inhaler with albuterol nebs  - Transplant pulmonology following, recs appreciated  - PTA immunosuppressive agent  >Prednisone 5 mg every morning, 2.5 mg every afternoon; tacrolimus 4 mg every morning, 3.5mg every afternoon  > Monitor tacro levels, goal 6-8  > overnight oximetry study suggestive of O2 vs CPAP need, as did require up to 2LPM overnight to prevent hypoxia  > Try to minimize O2 to preserve respiratory drive, will give IVIG for DSA+   - avoid HFNC, maintain minimum oxygen to keep SaO2 >85%   - continue AVAPS when sleeping (overnight and during naps)  - MIP/MEP testing again next week (ordered 3/11)  - Discuss GOC w/ Pall Care on 3/11  - PTA dapsone MWF for PJP prophylaxis  - Limit medications that would depress respiration   - d/c'd theophylline 3/3/24 due to difficulty attaining therapeutic levels (started by  ICU)  - continue thyroid function as below  - awaiting metabolic CART study results  - may need BiPAP at home as patient reports not having one. Sleep clinic referral at discharge.   - Contacted lung transplant social work team ([facility]) in regards to patient progression. Pulmonary team concerned she is not stable for discharge at this time due to trending pCO2 levels and not cooperating with BIPAP.     Antibiotics:  Vancomycin (2/17 - 2/17)  Zosyn (2/17 - 2/23) for HAP  Dapsone MWF, PJP ppx   Micafungin (2/18- 2/21)     Immunosuppression  Tacrolimus  Prednisone     #Severe malnutrition   #FTT   #Hypoalbuminemia  #Gastroparesis, small bowel dysmotility  #S/P PEG/J with intolerance of enteral nutrition  # Chronic osmotic diarrhea/SIBO s/p Rifaximin > improving     Patient with gastroparesis (presumed due to vagal injury) and small bowel dysmotility complicated by unintentional 40lb weight loss over the past year and now severe malnutrition. Previously intolerant to oral food intake due to nausea. Was initially admitted for portacath and TPN initiation since 2/13. After extubation has been able to tolerate feeds without n/v from 2/20. Electrolytes trending towards baseline today.  Per GI, next steps for workup for malnutrition would include CT enterography to evaluate for anatomic abnormalities contributing to malnutrition vs possible treatment for SIBO (though patient has had multiple courses of treatment in the past with no long term improvement). Patient couldn't tolerate the oral load for CT enterography on 2/21, so will defer this until she is able to tolerate greater PO load. On 2/23 , again discussed with patient the need of small bowel motility study if not improving outpatient through [place]. No ongoing concerns for SIBO on 2/23 as patient is passing multiple episodes of stools and gas with no abdominal pain or distention. C-diff test negative on 2/21. Per RD, patient tolerating >300 kcal of intake PO currently,  so stopped trickle Tube feeds and continuing with PO and TPN for nutrition.   - Regular diet + increased TPN +  no further tube feeds per RD & transplant pulm discussion               - Nephrology: limit fluid < 1.5 L per day for TPN ( chart vol of TPN in the I/O). Renal team okay with midline on RUE                - RD concerned pt is not absorbing any oral intake and they will be monitoring Bowel function, I/O and, PO/TF/TPN intake.               -  stopped TF as PO intake sufficient for preventing gut atrophy  - CT enterography with contrast- Pt not able to tolerate oral contrast load of 1500 ml on 2/21; no ongoing concerns for SIBO, would need small bowel motility study if not improving outpatient through [place]. --> assess if we can do this now and have more information for GI    #Acute on chronic anemia 2/2 ESRD  Hgb have been stable 7-8s, with no acute signs of bleeding, acute drop 2/29 from 8.2 > 6.6 after two rechecks, no overt signs of bleeding noted, patient reports some abdominal pain but otherwise physical exam unremarkable.  LUE US negative for DVT 2/18.    - continue epogen per nephrology with dialysis  - venofer 50 mcg qweek with dialysis  - type and screen performed 2/29, 1 unit pRBCs ordered and transfused    #ESRD on HD M/T/Th/Sat  #Hypervolemic hyponatremia  #Anion gap metabolic acidosis - resolved  Patient is ESRD on T/Th/Sat HD as outpt, Hgb stabilizing. HD tolerating well. Continuing monitoring electrolytes daily. Anion gap normal since 2/21. Will continue to monitor daily labs/ABG  for refeeding syndrome and acidosis.   - Continue Venofer injections    - CBC and CMP daily  - Continue epogen dose 8000 units as per nephrology  - Strict I/Os  - HD T/TH/Sat per nephrology given hypervolemia; Plan for 2hr UF run on M; 3hr on T/Th/S.   - K phos 250 mg bid on 3/4    # Elevated TSH and low T4 and T3  Patient with new low T4 at 0.64 and TSH at 6.5, concerning for new hypothyroidism vs sick thyroid  syndrome. TSH with appropriate response, more consistent with elevation in the setting of acute illness. ICU team on 2/28 recommended initiation of treatment for possible hypothyroid as this can improve respiratory muscle function in the setting of weakness and malnutrition.   - continue liothyronine and levothyroxine per ICU team recommendations  - repeat thyroid function 3/7 wnl    #Left upper extremity unilateral edema, improving   #Bilateral pedal and ankle edema, improving  Edema due to third spacing due to hypoalbuminemia vs HF. BNP >43611 at admission, Echo on 2/18 shows EF of 55-60% with collapsible IVC. Extremity edema 2/2 to hypoalbuminemia. Upper limb duplex USG on 2/18 negative for DVT.  USG left arm on 2/21 shows steel physiology and Vascular Surgery consulted (see below). Overall edema in extremities have improved significantly since initiating 4x/wk dialysis.    - Continue daily weights  - Strict I/Os  - Elevation and wrapping of only lower legs as needed and increased UF per nephrology for volume management; no wrapping of Left upper extremity with dialysis fistula  - lymphedema consulted for BLE edema  -HD as noted above      #Steal physiology of LUE dialysis access fistula  LUE arterial US obtained 2/21 with concern for LUE edema. US of fistula demonstrated steal physiology. Discussed with nephrology, and vascular surgery consulted on 2/22. Vascular surgery has only minor concerns for steal symptoms and given that the AVF is working well, they are okay with outpatient fistulograms/ venoplasty with wrist brachial index and PPG's, unless new concerns arise.  - plan for outpatient workup as above; nephrology in agreement with outpatient workup.     #Facial and neck bruising  #Pain  Reports soreness in her neck from lying in bed. No soreness in the buttocks/ back. Patient has multiple bruises over her arms and face which is attributed to previous falls. No new bruising reported today. Patients reports  her headaches are improving with tylenol. Using heating pads and lidocaine patch for body pains. Couple of small skin tears noted by the Rn's. Skin care done accordingly.  - Tylenol Q4  - Lidocaine patch prn  - Heating pads prn  - Diligent skin cares    #HTN  #A-fib, resolved  Noted atrial fibrillation on arrival with HR elevated at 150, not sustained for > 10 minutes and otherwise hemodynamically stable. RVR resolved w/o medications.   - PTA metoprolol 25mg BID - holding for now with lower BP with increased UF per nephrology, resume if becoming more hypertensive  - Continuous telemetry     # Encephalopathy secondary to hypercarbia - resolved  Patient was progressively more lethargic on 2/17 during admission in [facility]. Etiology likely secondary to hypercarbia in context of respiratory failure as patient was noted to have high CO2s concomitant with lethargy. Though receiving oxycodone and fentanyl, lethargy was only partially alleviated by narcan. LFTs and ammonia wnl with low suspicion of hepatic encephalopathy. BUN wnl with low suspicion for uremic encephalopathy. Head CT on 2/18 was negative with low suspicion of acute intracranial pathology. Patient is awake, alert, oriented and following commands since 2/20.     # Right hip fracture s/p ORIF (December 2023)   - PT/OT    # GERD  - PTA PPI    # Hx EBV viremia   # Hypogammaglobulinemia  # Chronic immunosuppression 2/2 lung transplant  Patient has been afebrile and has not had leukocytosis however she is under immunosuppression. Given acute respiratory failure and hx of recurrent pneumonias, she was started on empiric abx for concerns over new pneumonia. RVP and cultures no growth to date. Last day of empirical treatment for HAP.  - EBV 27K (decreased compared to prior)     # RLE edema and pain - U/S DVT without DVT     Lines/tubes/drains:  - PIV x2  - PEG/J  - HD AV fistula, left arm   - PICC for TPN        Diet: Renal Diet (dialysis)  parenteral  nutrition - ADULT compounded formula CYCLE  parenteral nutrition - ADULT compounded formula CYCLE    DVT Prophylaxis: resume subQ heparin  Dong Catheter: Not present  Fluids: TPN and PO  Lines: PRESENT      PICC 03/04/24 Double Lumen Right Basilic TPN. PICC okay to use.-Site Assessment: WDL  Hemodialysis Vascular Access Arteriovenous graft Superior Arm-Site Assessment: WDL      Cardiac Monitoring: None  Code Status: Full Code      Clinically Significant Risk Factors          # Hypocalcemia: Lowest Ca = 8.3 mg/dL in last 2 days, will monitor and replace as appropriate   # Hypomagnesemia: Lowest Mg = 1.5 mg/dL in last 2 days, will replace as needed   # Hypoalbuminemia: Lowest albumin = 2.6 g/dL at 2/18/2024  5:13 AM, will monitor as appropriate             # Severe Malnutrition: based on nutrition assessment      # Financial/Environmental Concerns: none         Disposition Plan         Betty Diaz MD  [facility]  Securely message with Vocera (more info)  Text page via [facility] Paging/Directory   See signed in provider for up to date coverage information  ______________________________________________________________________    Interval History   No acute events overnight.Refusing BiPAP. pCO2 in the 80s. Had a long conversation with pt regarding GOC and overall barriers to discharge. Called daughter and updated her and discussed the same challenges for ~30min. Family understands the issues and will discuss amongst themselves regarding GOC as well. Plan for care conference this week     Physical Exam   Vital Signs: Temp: 98.7  F (37.1  C) Temp src: Oral BP: 132/63 Pulse: 92   Resp: 15 SpO2: 96 % O2 Device: Nasal cannula Oxygen Delivery: 2 LPM  Weight: 101 lbs 9.6 oz  General: thin, laying in bed comfortably, no acute distress this morning. Very pleasant  HEENT: bruising on the right face around right orbit with hematoma and right neck, improved  Pulm/Resp: breathing  comfortably on NC, CTAB   CV: normal rate, regular rhythm. No LE edema  Neuro: alert and following commands, answering questions clearly and easily, moving all extremities.    Medical Decision Making       Please see A&P for additional details of medical decision making.      Data     I have personally reviewed the following data over the past 24 hrs:    6.3  \   9.7 (L)   / 217     138 101 48.2 (H) /  155 (H)   3.9 30 (H) 2.68 (H) \     ALT: <5 AST: 23 AP: 92 TBILI: 0.2   ALB: 3.2 (L) TOT PROTEIN: 5.5 (L) LIPASE: N/A       Imaging results reviewed over the past 24 hrs:   No results found for this or any previous visit (from the past 24 hour(s)).

## 2024-03-11 ENCOUNTER — APPOINTMENT (OUTPATIENT)
Dept: PHYSICAL THERAPY | Facility: CLINIC | Age: 64
DRG: 640 | End: 2024-03-11
Attending: INTERNAL MEDICINE
Payer: MEDICARE

## 2024-03-11 LAB
ALBUMIN SERPL BCG-MCNC: 3.5 G/DL (ref 3.5–5.2)
ALP SERPL-CCNC: 100 U/L (ref 40–150)
ALT SERPL W P-5'-P-CCNC: <5 U/L (ref 0–50)
ANION GAP SERPL CALCULATED.3IONS-SCNC: 9 MMOL/L (ref 7–15)
AST SERPL W P-5'-P-CCNC: 19 U/L (ref 0–45)
BASE EXCESS BLDV CALC-SCNC: -0.6 MMOL/L (ref -3–3)
BILIRUB SERPL-MCNC: 0.3 MG/DL
BUN SERPL-MCNC: 81.3 MG/DL (ref 8–23)
CALCIUM SERPL-MCNC: 8.9 MG/DL (ref 8.8–10.2)
CHLORIDE SERPL-SCNC: 103 MMOL/L (ref 98–107)
CREAT SERPL-MCNC: 4.04 MG/DL (ref 0.51–0.95)
DEPRECATED HCO3 PLAS-SCNC: 29 MMOL/L (ref 22–29)
EGFRCR SERPLBLD CKD-EPI 2021: 12 ML/MIN/1.73M2
GLUCOSE SERPL-MCNC: 139 MG/DL (ref 70–99)
HCO3 BLDV-SCNC: 30 MMOL/L (ref 21–28)
INR PPP: 1.02 (ref 0.85–1.15)
MAGNESIUM SERPL-MCNC: 1.9 MG/DL (ref 1.7–2.3)
O2/TOTAL GAS SETTING VFR VENT: 15 %
OXYHGB MFR BLDV: 83 % (ref 70–75)
PCO2 BLDV: 79 MM HG (ref 40–50)
PH BLDV: 7.18 [PH] (ref 7.32–7.43)
PHOSPHATE SERPL-MCNC: 4.4 MG/DL (ref 2.5–4.5)
PO2 BLDV: 60 MM HG (ref 25–47)
POTASSIUM SERPL-SCNC: 4.3 MMOL/L (ref 3.4–5.3)
PREALB SERPL-MCNC: 25 MG/DL (ref 20–40)
PROT SERPL-MCNC: 5.7 G/DL (ref 6.4–8.3)
SAO2 % BLDV: 85.5 % (ref 70–75)
SODIUM SERPL-SCNC: 141 MMOL/L (ref 135–145)
TACROLIMUS BLD-MCNC: 6.2 UG/L (ref 5–15)
TME LAST DOSE: NORMAL H
TME LAST DOSE: NORMAL H

## 2024-03-11 PROCEDURE — 250N000009 HC RX 250: Performed by: STUDENT IN AN ORGANIZED HEALTH CARE EDUCATION/TRAINING PROGRAM

## 2024-03-11 PROCEDURE — 85610 PROTHROMBIN TIME: CPT | Performed by: INTERNAL MEDICINE

## 2024-03-11 PROCEDURE — 250N000013 HC RX MED GY IP 250 OP 250 PS 637

## 2024-03-11 PROCEDURE — 90935 HEMODIALYSIS ONE EVALUATION: CPT

## 2024-03-11 PROCEDURE — 250N000012 HC RX MED GY IP 250 OP 636 PS 637

## 2024-03-11 PROCEDURE — 80053 COMPREHEN METABOLIC PANEL: CPT

## 2024-03-11 PROCEDURE — 36415 COLL VENOUS BLD VENIPUNCTURE: CPT | Performed by: STUDENT IN AN ORGANIZED HEALTH CARE EDUCATION/TRAINING PROGRAM

## 2024-03-11 PROCEDURE — 82805 BLOOD GASES W/O2 SATURATION: CPT

## 2024-03-11 PROCEDURE — 250N000012 HC RX MED GY IP 250 OP 636 PS 637: Performed by: INTERNAL MEDICINE

## 2024-03-11 PROCEDURE — 36415 COLL VENOUS BLD VENIPUNCTURE: CPT | Performed by: INTERNAL MEDICINE

## 2024-03-11 PROCEDURE — 97116 GAIT TRAINING THERAPY: CPT | Mod: GP

## 2024-03-11 PROCEDURE — 94640 AIRWAY INHALATION TREATMENT: CPT | Mod: 76

## 2024-03-11 PROCEDURE — 258N000003 HC RX IP 258 OP 636: Performed by: INTERNAL MEDICINE

## 2024-03-11 PROCEDURE — 84134 ASSAY OF PREALBUMIN: CPT

## 2024-03-11 PROCEDURE — 97530 THERAPEUTIC ACTIVITIES: CPT | Mod: GP

## 2024-03-11 PROCEDURE — 99233 SBSQ HOSP IP/OBS HIGH 50: CPT | Mod: 24 | Performed by: INTERNAL MEDICINE

## 2024-03-11 PROCEDURE — 99232 SBSQ HOSP IP/OBS MODERATE 35: CPT | Mod: GC | Performed by: STUDENT IN AN ORGANIZED HEALTH CARE EDUCATION/TRAINING PROGRAM

## 2024-03-11 PROCEDURE — 250N000009 HC RX 250

## 2024-03-11 PROCEDURE — 999N000044 HC STATISTIC CVC DRESSING CHANGE

## 2024-03-11 PROCEDURE — 80197 ASSAY OF TACROLIMUS: CPT | Performed by: STUDENT IN AN ORGANIZED HEALTH CARE EDUCATION/TRAINING PROGRAM

## 2024-03-11 PROCEDURE — 84100 ASSAY OF PHOSPHORUS: CPT | Performed by: STUDENT IN AN ORGANIZED HEALTH CARE EDUCATION/TRAINING PROGRAM

## 2024-03-11 PROCEDURE — 97110 THERAPEUTIC EXERCISES: CPT | Mod: GP

## 2024-03-11 PROCEDURE — 120N000002 HC R&B MED SURG/OB UMMC

## 2024-03-11 PROCEDURE — 250N000009 HC RX 250: Performed by: INTERNAL MEDICINE

## 2024-03-11 PROCEDURE — 250N000012 HC RX MED GY IP 250 OP 636 PS 637: Performed by: STUDENT IN AN ORGANIZED HEALTH CARE EDUCATION/TRAINING PROGRAM

## 2024-03-11 PROCEDURE — 83735 ASSAY OF MAGNESIUM: CPT | Performed by: STUDENT IN AN ORGANIZED HEALTH CARE EDUCATION/TRAINING PROGRAM

## 2024-03-11 PROCEDURE — 250N000011 HC RX IP 250 OP 636

## 2024-03-11 RX ORDER — HYDROXYZINE HYDROCHLORIDE 10 MG/1
5 TABLET, FILM COATED ORAL 3 TIMES DAILY PRN
Status: DISCONTINUED | OUTPATIENT
Start: 2024-03-11 | End: 2024-03-19

## 2024-03-11 RX ORDER — CYCLOSPORINE 100 MG/ML
125 SOLUTION ORAL
Status: DISCONTINUED | OUTPATIENT
Start: 2024-03-11 | End: 2024-03-15

## 2024-03-11 RX ORDER — SULFAMETHOXAZOLE AND TRIMETHOPRIM 400; 80 MG/1; MG/1
1 TABLET ORAL
Status: DISCONTINUED | OUTPATIENT
Start: 2024-03-13 | End: 2024-04-02

## 2024-03-11 RX ORDER — CYCLOSPORINE 100 MG/ML
125 SOLUTION ORAL
Status: DISCONTINUED | OUTPATIENT
Start: 2024-03-11 | End: 2024-03-11

## 2024-03-11 RX ADMIN — LIDOCAINE 4% 1 PATCH: 40 PATCH TOPICAL at 20:23

## 2024-03-11 RX ADMIN — MAGNESIUM SULFATE HEPTAHYDRATE: 500 INJECTION, SOLUTION INTRAMUSCULAR; INTRAVENOUS at 22:06

## 2024-03-11 RX ADMIN — Medication 40 MG: at 10:37

## 2024-03-11 RX ADMIN — PREDNISONE 5 MG: 5 TABLET ORAL at 10:37

## 2024-03-11 RX ADMIN — SODIUM CHLORIDE 250 ML: 9 INJECTION, SOLUTION INTRAVENOUS at 07:59

## 2024-03-11 RX ADMIN — CYANOCOBALAMIN TAB 500 MCG 500 MCG: 500 TAB at 10:37

## 2024-03-11 RX ADMIN — CALCIUM CARBONATE 600 MG (1,500 MG)-VITAMIN D3 400 UNIT TABLET 1 TABLET: at 14:31

## 2024-03-11 RX ADMIN — Medication 40 MG: at 20:23

## 2024-03-11 RX ADMIN — CALCIUM CARBONATE 600 MG (1,500 MG)-VITAMIN D3 400 UNIT TABLET 1 TABLET: at 18:14

## 2024-03-11 RX ADMIN — PREDNISONE 2.5 MG: 2.5 TABLET ORAL at 20:22

## 2024-03-11 RX ADMIN — LOPERAMIDE HYDROCHLORIDE 2 MG: 2 CAPSULE ORAL at 20:30

## 2024-03-11 RX ADMIN — LEVOTHYROXINE SODIUM 25 MCG: 0.03 TABLET ORAL at 10:39

## 2024-03-11 RX ADMIN — Medication 2.5 MCG: at 10:37

## 2024-03-11 RX ADMIN — SMOFLIPID 250 ML: 6; 6; 5; 3 INJECTION, EMULSION INTRAVENOUS at 22:06

## 2024-03-11 RX ADMIN — SODIUM CHLORIDE 300 ML: 9 INJECTION, SOLUTION INTRAVENOUS at 07:59

## 2024-03-11 RX ADMIN — HEPARIN SODIUM 5000 UNITS: 5000 INJECTION, SOLUTION INTRAVENOUS; SUBCUTANEOUS at 18:14

## 2024-03-11 RX ADMIN — Medication 2.5 MCG: at 20:22

## 2024-03-11 RX ADMIN — LIDOCAINE AND PRILOCAINE: 25; 25 CREAM TOPICAL at 06:11

## 2024-03-11 RX ADMIN — HEPARIN SODIUM 5000 UNITS: 5000 INJECTION, SOLUTION INTRAVENOUS; SUBCUTANEOUS at 06:11

## 2024-03-11 RX ADMIN — TACROLIMUS 4 MG: 5 CAPSULE ORAL at 10:37

## 2024-03-11 RX ADMIN — DAPSONE 50 MG: 100 TABLET ORAL at 10:37

## 2024-03-11 RX ADMIN — LOPERAMIDE HYDROCHLORIDE 2 MG: 2 CAPSULE ORAL at 10:42

## 2024-03-11 RX ADMIN — Medication: at 07:59

## 2024-03-11 RX ADMIN — CYCLOSPORINE 125 MG: 100 SOLUTION ORAL at 20:22

## 2024-03-11 RX ADMIN — LEVALBUTEROL HYDROCHLORIDE 1.25 MG: 1.25 SOLUTION RESPIRATORY (INHALATION) at 20:44

## 2024-03-11 ASSESSMENT — ACTIVITIES OF DAILY LIVING (ADL)
ADLS_ACUITY_SCORE: 36

## 2024-03-11 NOTE — PROGRESS NOTES
Lung Transplant Consult Follow Up Note   March 11, 2024            Assessment and Plan:   Sofie Rodriguez is a 63 year old female with h/o COPD s/p BSLT (2022) with course complicated by post-operative hemidiaphragm palsy, recurrent PNAs, positive DSA, EBV viremia, hypogammaglobulinemia, severe gastroparesis s/p G/J tube placement (7/27/22) and pyloric botox (1/25/23), GI bleed 2/2 pyloric ulcer, hemobilia s/p ERCP and MRCP, chronic diarrhea, recurrent C diff colitis, ESRD on iHD, recurrent falls with injuries (recent right hip fx s/p ORIF 12/2023), deconditioning, and FTT.  Admitted 2/10 for initiation of TPN/lipids.  Transferred to ICU 2/17 for emergent intubation for hypoxic and hypercapneic respiratory failure with severe respiratory acidosis and encephalopathy.  iHD increased, infectious workup unremarkable. Extubated 2/19. Persistent and progressive hypercapnia with severe acidosis, returned to ICU 2/28-2/29 d/t tenuous respiratory status. Improvement noted with when was able to tolerate NIPPV. Poor toleration with worsening acidosis currently. With little  progress for weeks, recommend wholesale change in management (3/11), see below.     Today's recommendations:  - Discontinue tacrolimus (ordered)  - Discontinue dapsone (ordered)  - Start cyclosporine emulsion 125mg BID (ordered)  - Start bactrim single strength qM/W/F (ordered)  - Taper off TPN (defer to primary team and dietitians)  - Encourage increased oral intake and provide caloric supplements.  - AVAPS at night and with naps, poor toleration, trying to encourage compliance  - Repeat MIP and MEP this week.  - Daily AM VBG  - Sputum cultures ordered if able to collect  - DSA pending from 3/6  - Repeat CMV, Prospera, and EBV ordered 3/18  - iHD 4x/week schedule (MTTSa), per nephrology would likely need this schedule while on TPN given volume reaccumulation, reassess when off TPN.  - Would discuss patient again with Palliative service  - Care  conference/Goals of care later this week.    Management re-evaluation- Despite 30 days in the hospital, the patient has made very little progress. Minimal weight gain with TPN (although difficult to assess with weight fluctuations with dialysis). Requiring every other day dialysis while on TPN.  Neither TPN nor every other day dialysis is likely sustainable outside of an acute care hospital. Oral intake has improved (modestly). Diarrhea has been present since transplant despite stopping much of the early post transplant medications. Diarrhea is likely contributing to poor nutrition and possibly acidosis (with bicarb loss in the stool).   Need to consider tacrolimus or dapsone as possible contributers to diarrhea.  - Change from tacrolimus to cyclosporine with a goal of 140-170.  - Change from dapsone to qMWF bactrim. If diarrhea persists, consider change to pentamidine.  - Taper off TPN (defer to primary team and dietitian). Encourage oral nutrition and provide caloric supplements.       Acute on chronic hypoxic/hypercapneic respiratory failure:  S/p bilateral sequential lung transplant for COPD:  Right hemidiaphragm palsy:   Hypoventilation, Suspected CARLEE: CT PTA (2/7) with decreased MARLON opacities but new tree in bud RLL opacities.  PFTs unchanged in clinic (but ATS criteria not met), remain significantly below her baseline.  Baseline hypoxia with 2L NC overnight.  Respiratory decompensation with encephalopathy and severe respiratory acidosis on 2/17.  S/p intubation 2/17-2/19.  Repeat CT (2/17) with new patchy consolidative and nodular opacities, GGO primarily in the bases and intralobular septal thickening (concerning for pulmonary edema given recent addition of TPN nutrition, new infection, PTLD, and/or septic emboli.  Bronch with lavage of RML (2/18) with very friable tissue, cutlures only with C. glabrata.  S/p empiric Zosyn (2/17-2/24) and micafungin (2/18-2/21).  Severe respiratory acidosis with hypercapnia  persisting with slight improvement with NIPPV treatment, limited by pt. tolerance to pressure and mask (claustrophobia).  Persistent respiratory failure and variable encephalopathy also complicated by diaphragmatic weakness, hypoventilation, and deconditioning d/t malnutrition.  Repeat CT (2/26) with diffuse GG and consolidative opacities >BLL and diffuse interlobular septal thickening.       Intermittent tolerance of NIPPV, some improvement with transition to AVAPS. Neurology consulted 2/27. Transferred to ICU 2/28 given tenuous respiratory status and potential need for reintubation.  Improvement noted with continuous NIPPV with minimal interruptions (sodium bicarb also stopped, likely partially contributing), trial off NIPPV during the day started 2/29. MRI brain (3/1) with moderate leukoaraiosis, no acute intracranial pathology. Currently wearing AVAPS for several hours per night at best, pCO2 70s to low 80s with pH 7.17-7.24 for past week. Increasing reluctance to wear mask, partially as does not believe helps but also reports it is suffocating.   - 3/11 VBG 7.18/79  - Check daily AM VBG  - AVAPS at night and with naps, difficulty tolerating and intermittently refusing. Have discussed importance of wearing and will encourage her to wear tonight, will try to monitor tidal volumes when getting therapy  - NIF -40 and  on 3/5, would repeat this week to trend  - SNIF testing performed 3/8- Movement of bilateral hemidiaphragm with respiration.   - Sputum cultures ordered if able to collect  - Xopenex BID (2/27)  - Defer ABX at this time  - Sleep clinic eval indicated as OP  - Given patient's feeling of being overwhelmed by current level of medical cares and need for these cares to continue long term would ask Palliative team to visit with patient again this week     Immunosuppression:  ImmuKnow low at 108 on 1/10.  AZA previously stopped 5/2023 d/t low ImmuKnow assay and EBV viremia.  Repeat ImmuKnow (2/27) low  at 88.  Change from [place] to CSA 3/11 (see above).  - CSA goal 140-170.  Start CSA emulsion 125mg BID and check a level Thursday (ordered)  - Prednisone 5 mg qAM / 2.5 mg qPM     Prophylaxis:   - Discontinue dapsone (3/11). Start bactrim single strength qM/W/F.  If diarrhe persists, change to pentamidine.  - CMV ppx not currently indicated, D+/R+, CMV negative 2/19 (BAL very mildly positive 2/18, likely not clinically significant), repeat CMV ordered 3/18     Positive DSA: PFTs and pulmonary symptoms have remained stable as OP, so AMR treatment deferred given frailty.  Most recent DSA (2/7) with DQB2 mfi 6966 (from 5723 one month prior).  Most recent cell-free DNA Prospera (2/18) mildly elevated at 1.04 (concerning for possible rejection), which was increased from prior level of 0.12 (1/10).  IST increase deferred at that time per Dr. Gong.  S/p IVIG (2/27) for DSA (IgG WNL).  - Repeat DSA pending (3/6)  - Repeat Prospera ordered 3/18      EBV viremia: CT CAP (2/7) without lymphadenopathy.  Most recent level (2/18) improved to 28k from 96k (2/7)  - Repeat EBV ordered 3/18     Other relevant problems being managed by the primary team:      FTT:  Severe protein calorie malnutrition:  Gastroparesis s/p PEG/J, botox, and G-POEM:  SB hypomotility:  Pyloric ulcer:  Chronic nausea and osmotic diarrhea:  SIBO s/p rifaximin:   Recurrent C diff colitis: Chronic osmotic and infectious diarrhea since transplant with recurrent episodes of C diff.  Notable weight loss (40# in a year) d/t diarrhea, GI dysmotility, and intolerance of enteral feeds (PEG/J tube in place), most recently on elemental formula.  Extensive OP eval and f/u with GI.  S/p port placement for TPN and lipids.  Tolerating modest PO intake (likely absorbing minimal per GI), monitoring for refeeding syndrome. Recommend trial of enteral nutrition (see above)  - Consider tapering off TPN, defer to primary team and dietitian (discussed with primary team)  -  Encourage PO nutrition and oral spupplements.  - CT enterography recommended per GI, but unlikely to be able to tolerate the required 1.5 L enteral contrast bolus so deferring for now     ESRD: CT scan (with declining respiratory status) with volume overload secondary to TPN volume, iHD increased from PTA TThSa schedule with unsustained improvement.  Management per nephrology, dialysis via [facility].    - iHD 4x/week schedule (MTTSa), per Nephrology would likely need this schedule while on TPN given volume reaccumulation     We appreciate the excellent care provided by the [place] [id number] team.  Recommendations communicated via this note and in person.  Will continue to follow along closely, please do not hesitate to call with any questions or concerns.    Ajay Castillo MD  [phone]              Interval History:   No events overnight.  Frustrated by prolonged hospitalization. Ate cream of wheat for breakfast and whole turkey sandwich for lunch and dinner yesterday.  Some pain at PICC site.  Dyspnea at rest: None  Dyspnea on exertion:  tolerating ambulation in the reilly.   Cough:  occasional  Sputum: minimal, swallowed  Hemoptysis: none   Chest Pain: None           Review of Systems:   C: NEGATIVE for fever, chills  INTEGUMENTARY/SKIN: no rash or obvious new lesions  ENT/MOUTH: no sore throat, new sinus pain or nasal drainage  RESP: see interval history  CV: NEGATIVE for chest pain, palpitations or peripheral edema  GI: no nausea, vomiting. Persistent loose stool  : no dysuria, minimal urine output  MUSCULOSKELETAL: no myalgias, arthralgias          Medications:      calcium carbonate-vitamin D  1 tablet Per J Tube TID w/meals    cyanocobalamin  500 mcg Per Feeding Tube Daily    dapsone  50 mg Per J Tube Q Mon Wed Fri AM    heparin ANTICOAGULANT  5,000 Units Subcutaneous Q12H    levalbuterol  1.25 mg Nebulization 2 times daily    levothyroxine  25 mcg Oral QAM AC    lidocaine  1 patch Transdermal Q24h     liothyronine  2.5 mcg Oral BID    lipids 4 oil  250 mL Intravenous Once per day on Monday Tuesday Wednesday Thursday Friday    [Held by provider] metoprolol  25 mg Per J Tube BID    pantoprazole  40 mg Per J Tube BID    predniSONE  5 mg Per J Tube QAM    And    predniSONE  2.5 mg Per J Tube QPM    [Held by provider] sevelamer carbonate (RENVELA)  0.8 g Oral BID    sodium chloride (PF)  10 mL Intracatheter Q8H    tacrolimus  4 mg Per G Tube QAM    And    tacrolimus  3.5 mg Per G Tube QPM     acetaminophen, albumin human, albuterol, calcium carbonate, artificial tears, dextrose, glucose **OR** dextrose **OR** glucagon, diphenhydrAMINE **OR** diphenhydrAMINE, hydrOXYzine HCl, lidocaine 4%, lidocaine (buffered or not buffered), lidocaine (buffered or not buffered), lidocaine (buffered or not buffered), lidocaine (buffered or not buffered), lidocaine-prilocaine, loperamide, naloxone **OR** naloxone **OR** naloxone **OR** naloxone, - MEDICATION INSTRUCTIONS -, ondansetron **OR** ondansetron, - MEDICATION INSTRUCTIONS -, senna-docusate **OR** senna-docusate, sodium chloride, sodium chloride (PF), sodium chloride (PF), sodium chloride 0.9%, sodium chloride 0.9%, - MEDICATION INSTRUCTIONS -         Physical Exam:   Temp:  [97.4  F (36.3  C)-98.7  F (37.1  C)] 97.8  F (36.6  C)  Pulse:  [] 90  Resp:  [15-18] 16  BP: (125-142)/(52-72) 133/71  SpO2:  [96 %-100 %] 100 %    Intake/Output Summary (Last 24 hours) at 3/11/2024 1147  Last data filed at 3/11/2024 0945  Gross per 24 hour   Intake 100 ml   Output 2000 ml   Net -1900 ml     Constitutional:   Awake, alert and in no apparent distress     Eyes:   nonicteric     ENT:    oral mucosa moist without lesions     Neck:   Supple without supraclavicular or cervical lymphadenopathy     Lungs:   Good air flow.  Right basilar crackles. No rhonchi.  No wheezes.     Cardiovascular:   Normal S1 and S2.  RRR.  II/VI sys murmur. No gallop. No rub.     Abdomen:   NABS, soft,  nondistended, mild diffuse tenderness.  No HSM.     Musculoskeletal:   No edema     Neurologic:   Alert and conversant.     Skin:   Warm, dry.  No rash on limited exam.             Data:   All laboratory and imaging data reviewed.    Results for orders placed or performed during the hospital encounter of 02/10/24 (from the past 24 hour(s))   INR   Result Value Ref Range    INR 1.02 0.85 - 1.15   Prealbumin   Result Value Ref Range    Prealbumin 25.0 20.0 - 40.0 mg/dL   Comprehensive metabolic panel   Result Value Ref Range    Sodium 141 135 - 145 mmol/L    Potassium 4.3 3.4 - 5.3 mmol/L    Carbon Dioxide (CO2) 29 22 - 29 mmol/L    Anion Gap 9 7 - 15 mmol/L    Urea Nitrogen 81.3 (H) 8.0 - 23.0 mg/dL    Creatinine 4.04 (H) 0.51 - 0.95 mg/dL    GFR Estimate 12 (L) >60 mL/min/1.73m2    Calcium 8.9 8.8 - 10.2 mg/dL    Chloride 103 98 - 107 mmol/L    Glucose 139 (H) 70 - 99 mg/dL    Alkaline Phosphatase 100 40 - 150 U/L    AST 19 0 - 45 U/L    ALT <5 0 - 50 U/L    Protein Total 5.7 (L) 6.4 - 8.3 g/dL    Albumin 3.5 3.5 - 5.2 g/dL    Bilirubin Total 0.3 <=1.2 mg/dL   Blood gas venous   Result Value Ref Range    pH Venous 7.18 (LL) 7.32 - 7.43    pCO2 Venous 79 (HH) 40 - 50 mm Hg    pO2 Venous 60 (H) 25 - 47 mm Hg    Bicarbonate Venous 30 (H) 21 - 28 mmol/L    Base Excess/Deficit Venous -0.6 -3.0 - 3.0 mmol/L    FIO2 15     Oxyhemoglobin Venous 83 (H) 70 - 75 %    O2 Sat, Venous 85.5 (H) 70.0 - 75.0 %    Narrative    In healthy individuals, oxyhemoglobin (O2Hb) and oxygen saturation (SO2) are approximately equal. In the presence of dyshemoglobins, oxyhemoglobin can be considerably lower than oxygen saturation.   Magnesium   Result Value Ref Range    Magnesium 1.9 1.7 - 2.3 mg/dL   Phosphorus   Result Value Ref Range    Phosphorus 4.4 2.5 - 4.5 mg/dL     *Note: Due to a large number of results and/or encounters for the requested time period, some results have not been displayed. A complete set of results can be found in  Results Review.

## 2024-03-11 NOTE — PLAN OF CARE
Goal Outcome Evaluation:      Plan of Care Reviewed With: patient    Overall Patient Progress: no changeOverall Patient Progress: no change    Outcome Evaluation: VSS 2L NC, refused bipap again as per pt it doesn't help. TPN [id number]. Dialysis this am and every day this week. Levothyroxine held as pt does not eat in dialysis.Palliative consult today, care conference some time this week. Critical lab venous ph 7.18, PCO2 79, provider advised.

## 2024-03-11 NOTE — PROGRESS NOTES
Care Management Follow Up    Length of Stay (days): 30    Expected Discharge Date:  TBD     Concerns to be Addressed: discharge planning, other (see comments) (New TPN)     Patient plan of care discussed at interdisciplinary rounds: Yes    Anticipated Discharge Disposition: Transitional Care, Home Care, Dialysis Services     Anticipated Discharge Services:  Prior to hospitalization and per initial Care Management Consult note, the following services were in place:     [facility]: Pt has chair time of 11:45 AM T/TH/Sat  Phone number: [phone]  Fax: [phone]     [facility] - [phone]     Home [address] through [employer] [facility]: Fax: [phone] Phone: [phone]  [facility]: Fax [phone]     Pulmonary rehab at [facility]  Fax to [phone].      [facility]: Pt has chair time of 11:45 AM T/TH/Sat  Phone number: [phone]  Fax: [phone]  Anticipated Discharge DME: None    Patient/family educated on Medicare website which has current facility and service quality ratings:  TBD  Education Provided on the Discharge Plan: Yes  Patient/Family in Agreement with the Plan: yes, final GOC TBD    Referrals Placed by CM/SW: External Care Coordination  Private pay costs discussed: Not applicable    Additional Information:   tasked with scheduling GOC conference with patient's daughter, Transplant Pulmonology, Transplant SW, and Medicine.  SW reached out to Transplant SW following, Mana Valdivia, Transplant Pulm Providers, Dr. Olive Franks and Dr. Ajay Castillo, in addition to [place] [address] Medicine Providers in addition to Palliative Provider, Dr. Pink. SW called and spoke with patient's daughter, Julia, to coordinate care conference, and Julia will be available via phone on Friday at 2:30pm, as well as her sister, Charity. Team updated.    Care Conference scheduled for Friday, 3/15/24 at 2:30pm.        MACHO Madrigal, [facility]  [address]  [address]   [email or web address]  Office: [phone]   Pager: [phone]

## 2024-03-11 NOTE — PROGRESS NOTES
CLINICAL NUTRITION SERVICES - REASSESSMENT NOTE     Nutrition Prescription    RECOMMENDATIONS FOR MDs/PROVIDERS TO ORDER:  Continue with TPN + PO for comfort     Malnutrition Status:    Severe malnutrition in the context of chronic condition     Recommendations already ordered by Registered Dietitian (RD):  None today     Future/Additional Recommendations:  TPN tolerance         EVALUATION OF THE PROGRESS TOWARD GOALS     Nutrition:     # Diet:   On Dialysis  PO for pleasure only likely due to GI dysmotility       # EN:   PEGJ placed 7/27/22, Was on trickle feeds to stimulate GI and avoid gut atrophy. Taking some Po so TF stopped.   Currently tube clamped between medication  Per provider: patient with chronic osmotic and infectious diarrhea since transplant with recurrent episodes of C diff.    Admitted for TPN initiation due to concern that she had 40 lb weight loss due to inability to tolerate tube feeds and diarrhea, GI dysmotility (gastroparesis (s/p g-poem) and ?small bowel hypomotility). Intolerance of enteral feeds (PEG/J tube in place), most recently on elemental formula with inability to tolerate any tube feeds.       # TPN:      Access: Central line  Dosing wt: 46 kg (Using EDW: 45.5 kg per nephrology)    Volume: Max concentrated, 12 hour overnight cycle regimen, from 700 pm - 0:700 am @ 640 ml PN volume ( patient is Oliguric on HD, high risk for volume overload, pulmonary edema)     Dextrose: Reduced to 160 gm /day   AA: Increased to 75 gm /day  Lipids: SMOF 5 days per week     Additives: infuvite 10 ml /day, trace elements 1 ml daily + started on zinc supplementation 5 mg daily x 2 weeks     Provision: 1200 kcal/day ( 26 kcal/kg), 75 gm protein/day ( 1.6 gm/kg), 160 gm carb /day ( GIR: 5.8 mg/kg/min peak infusion) and 30% kcal from daily lipids     Monitor weekly TG's and LFT's       Intake:     TPN: meets needs  TF: off since 2/26   PO: diet for pleasure only and to stimulate gut and to avoid  atrophy    Visited with patient this morning. Consuming 100% of small meals. Soups, hot cereal, juice       NEW FINDINGS   Chart reviewed:   PMH of bilateral lung transplant for COPD (6/28/2022),  Complicated by post-operative hemidiaphragm palsy,   Recurrent PNAs,   Severe gastroparesis and small bowel dysmotility with s/p G/J tube placement (7/27/22), pyloric botox (1/25/23), Pyloric ulcer and Gastric POEM  ESRD on iHD, recurrent falls with injuries (recent right hip fx s/p ORIF 12/2023),       Presented on 2/10/24 for TPN/lipids initiation.  - Developed hypoxic respiratory failure due to pulmonary edema and therefore HD increased to 4x per week ( Was in ICU from 2/17-2/19 and 2/28-2/29).    - Hypercarbia respiratory failure due to post op diaphragm palsy, On O2,   - MRI of the brain without any acute intracranial pathology to explain the patient's lack of central cause of physiological response to hypercarbia. Per pulmonology suspect CARLEE  - ESRD: L AV fistula for HD. HD schedule is x4 per week  - Gastroparesis: S/P Gastric POEM and ?? Bowel hypomotility. On TPN  - Lymphedema for BLE edema       Meds:   On immune suppressant meds via G-tube 2/2 lung txp      GI/stool:  Having 3-5 daily stool  C-diff test negative on 2/21       Labs noted:  BUN: 81.3, Cr: 4.04, GFR: 12-> On HD, 4x per week  Na+: 141  K+: 4.3, Mg++: 1.9, Phos: 4.4 -> currently not on phos binders ( renvela on hold)  Glucose: 139 (H)  Bicarb: 29    ALP: 100, ALT:<5, AST:19, Total bili: 0.3  TGS: 59 on 3/4  Zinc: 48.3 micrograms/dl ( low on 4/5 and started on supplementation with PN)  Vitamin C: low @ 14 on 2/20 ( Infuvite provides 200 mg of vitamin C daily)       Wt trend:  Admit wt on 2/10 -> 43.4 kg (95 lb 9.6 oz)   Standing wt on 2/14 -> 46.2 kg (101 lb 12.8 oz)   Most recent standing wt on 3/11 ->  46 kg (101 lb 8 oz)   EDW: 45.5 kg ( current TPN dosing wt is based on 46 kg estimated dry wt)  2+ edema foot and ankle      MALNUTRITION  Intake:  Decreased intake does not meet criteria, On TPN  % Weight Loss: Unable to assess ( currently at EDW), previously > 20% in 1 year (severe)   Subcutaneous Fat Loss: Facial region:  moderate  Muscle Loss: Temporal:Moderate, Facial & jaw region:  moderate, Thoracic region (clavicle):  severe, Upper arm (bicep, tricep):  severe, Lower arm  (forearm):  severe, Upper leg (quadricep, hamstring):  severe, and Posterior calf:  severe  Fluid Accumulation/Edema: Mild  Malnutrition Diagnosis: Severe malnutrition in the context of chronic condition       Previous Goals   Total avg nutritional intake to meet a minimum of 25 kcal/kg and 1.5 g PRO/kg daily (per dosing wt 46 kg EDW ).  Evaluation: Met with overnight TPN      Previous Nutrition Diagnosis  Malnutrition related to altered GI as evidenced by reliant on TPN support to provide for maintenance nutrition needs while PO for comfort     Evaluation: No change     CURRENT NUTRITION DIAGNOSIS  Malnutrition related to altered GI as evidenced by reliant on TPN support to provide for maintenance nutrition needs while PO for comfort       INTERVENTIONS  Implementation  Parenteral Nutrition/IV Fluids : no changes     Goals  Total avg nutritional intake to meet a minimum of 25 kcal/kg and 1.5 g PRO/kg daily (per dosing wt 46 kg EDW ).    Monitoring/Evaluation  Progress toward goals will be monitored and evaluated per protocol.    Tres Vazquez RD/MAGDA  7C ([phone])/7D ([phone]) RD Dietitian  Available on Vocera - can search by name or unit Dietitian  **Clinical Nutrition is no longer available via pager

## 2024-03-11 NOTE — PLAN OF CARE
Goal Outcome Evaluation:      Plan of Care Reviewed With: patient    Overall Patient Progress: no change    Outcome Evaluation: Pt A&O. VSS on RA, intermittently on 1L O2 NC. Denies pain. R DL PICC-SL, blood return noted. Had HD this AM. L arm fistula. Had 3 loose stools this shift. Worked with therapy. Plan for care conference this week and HD tomorrow AM at 0740.

## 2024-03-11 NOTE — PROGRESS NOTES
Nephrology Progress Note  03/11/2024         Assessment & Recommendations:   Sofie Rodriguez is a 63 year old year old female with ESKD, COPD s/p b/l lung transplant in 6/2022 with multiple complications, gastroparesis s/p GJ tube, GIB, chronic diarrhea, recurrent c-diff, FTT with inability to tolerate any tube feeds, R hip fx s/p ORIF 12/2023, admitted on 2/10 for initiation of TPN/lipids, transferred to ICU on 2/17 with worsening mental status and respiratory acidosis in spite of bipap, ultimately intubated on 2/18, being treated for PNA, also with volume overload in setting of high volume TPN. Persistent respiratory acidosis in spite of volume off, transferred to ICU for hypotension and respiratory failure, improved on bipap, now floor status again 3/1    # ESKD - TTS, LUE AVG, 3hr, 45.5kg (will be lowered), [facility], Dr. Andres Fairbanks. She has been losing weight and now even below her recent set EDW.  - Pt is now agreeable to 4x/week dialysis (3 hrs TTS and 2 hrs Monday's)- had run today and well tolerated.  - appreciate care coordination to assure OP HD can accommodate 4x/week HD  - Requires EMLA cream an hour before HD  - please avoid PICC which will compromise future dialysis accesses; a midline is much preferred for this patient       # Dialysis access: LUE AVG placed 3-4 months ago, per pt. She had arm swelling and a fistulogram a couple months ago and swelling improved but now has redeveloped.  - US with c/f possible steal syndrome  - per vascular surgery, no concern for steal syndrome, ok to go ahead with fistulogram  - given that AVF is working well on dialysis (450 BFR) and at this time IR is only able to perform fistulograms when AVF isn't working well, will defer to OP for management.    # Persistent respiratory acidosis: MRI without c/f central cause; possible weakness of resp muscles. Adding phos to dialysate today  - plan is for bipap at night and during day if napping (though pt  "doesn't always do this)    # Volume/BP: EDW 45.5 kg. Edema due to third spacing due to hypoalbuminemia. Anuric; on metoprolol soln 25 mg bid  - volume overload on CXR and on exam with 1-2+ pitting edema   - No wt today, 2kg UF  - appreciate condensing TPN (currently 400-600 ml per day)       # Nutrition: on TPN and regular diet  - per team, concern is that pt isn't absorbing much PO or tube feeds with diarrhea increasing significantly with increase in tube feeds; thus needing TPN     # Anemia 2/2 ESKD  On Venofer 50 qwk, Mircera last dose 1/9/2024  - hgb improved to 9.7s  - Will continue Venofer 50 mcg q week (Tuesday)  - increase epogen dose from 4000 to 8000 units (starting 2/20), TTS    # BMD: Ca 8-9's, phos 3.4, alb 3.2  - renvela is held, will restart if phos remains elevated  - startrf K phos 250 mg bid 3/4 and phosphorus stable at 4   - more phos added to TPN           Recommendations were communicated to primary team via note and phone    Rayna eBar Boland MD   Division of Renal Disease and Hypertension  [phone]    Interval History :   Seen in room after dialysis, stable run for 2 liters. No n/v, CP, SOB, chills    Review of Systems:   4 point ROS neg other than as noted above    Physical Exam:   I/O last 3 completed shifts:  In: 0   Out: 2000 [Other:2000]   BP (!) 141/61 (BP Location: Right arm)   Pulse 88   Temp 97.6  F (36.4  C) (Oral)   Resp 18   Ht 1.57 m (5' 1.81\")   Wt 46 kg (101 lb 8 oz)   SpO2 91%   BMI 18.68 kg/m       GENERAL APPEARANCE: NAD  PULM: diminished  CV: regular rhythm, normal rate, no rub     -1-2+ pitting pedal and ankles, (LUE (fistula arm) > RUE)  GI: soft   INTEGUMENT: no cyanosis, no rash  NEURO: a/o3  Access Left AVG     Labs:   All labs reviewed by me  Electrolytes/Renal -   Recent Labs   Lab Test 03/11/24  0557 03/10/24  0608 03/09/24  2125 03/09/24  1659 03/09/24 0623    138  --   --  138   POTASSIUM 4.3 3.9  --   --  3.8   CHLORIDE 103 101  --   --  101 "   CO2 29 30*  --   --  25   BUN 81.3* 48.2*  --   --  77.7*   CR 4.04* 2.68*  --   --  3.77*   * 155* 116*  --  127*   ESTUARDO 8.9 8.3*  --   --  8.9   MAG 1.9 1.7  --  1.5* 1.9   PHOS 4.4 3.9  --  2.7 4.8*       CBC -   Recent Labs   Lab Test 03/10/24  0608 03/09/24  0623 03/08/24  0555   WBC 6.3 6.2 5.8   HGB 9.7* 9.5* 9.0*    229 212       LFTs -   Recent Labs   Lab Test 03/11/24  0557 03/10/24  0608 03/09/24  0623   ALKPHOS 100 92 88   BILITOTAL 0.3 0.2 0.2   ALT <5 <5 <5   AST 19 23 16   PROTTOTAL 5.7* 5.5* 5.7*   ALBUMIN 3.5 3.2* 3.4*       Iron Panel -   Recent Labs   Lab Test 09/26/22  0555 09/03/22  1039 08/24/22  0810   IRON 54 21* 41   IRONSAT 22 9* 21   CARLOS 769* 343* 334*         Imaging:  All imaging studies reviewed by me.     Current Medications:   calcium carbonate-vitamin D  1 tablet Per J Tube TID w/meals    cyanocobalamin  500 mcg Per Feeding Tube Daily    dapsone  50 mg Per J Tube Q Mon Wed Fri AM    heparin ANTICOAGULANT  5,000 Units Subcutaneous Q12H    levalbuterol  1.25 mg Nebulization 2 times daily    levothyroxine  25 mcg Oral QAM AC    lidocaine  1 patch Transdermal Q24h    liothyronine  2.5 mcg Oral BID    lipids 4 oil  250 mL Intravenous Once per day on Monday Tuesday Wednesday Thursday Friday    [Held by provider] metoprolol  25 mg Per J Tube BID    pantoprazole  40 mg Per J Tube BID    predniSONE  5 mg Per J Tube QAM    And    predniSONE  2.5 mg Per J Tube QPM    [Held by provider] sevelamer carbonate (RENVELA)  0.8 g Oral BID    sodium chloride (PF)  10 mL Intracatheter Q8H    tacrolimus  4 mg Per G Tube QAM    And    tacrolimus  3.5 mg Per G Tube QPM      dextrose      - MEDICATION INSTRUCTIONS -      parenteral nutrition - ADULT compounded formula CYCLE      parenteral nutrition - ADULT compounded formula CYCLE Stopped (03/11/24 0913)    - MEDICATION INSTRUCTIONS -      sodium chloride 0.9%       Rayna Bear Boland MD

## 2024-03-11 NOTE — PROGRESS NOTES
HEMODIALYSIS TREATMENT NOTE    Date: 3/11/2024  Time: 9:28 AM    Data:  Pre Wt: 47.4 kg (104 lb 8 oz)   Desired Wt:   kg   Post Wt: 44.4 kg (97 lb 14.2 oz)  Weight change: 3 kg  Ultrafiltration - Post Run Net Total Removed (mL): 3000 mL  Vascular Access Status: patent  Dialyzer Rinse: Streaked  Total Blood Volume Processed: 0 L Liters  Total Dialysis (Treatment) Time: 3 Hours    Lab:   No    Interventions:  PCN appied EMLA cream to AVF site prior to tx  LAVF cannulated 15 gauge needles  Needles removed  Pressure dressing applied    Assessment:  Pt ran for 2 hrs UF only with a / DFR SEQ and 2 L removed. LAVF positive for T/B. Pt rinsed back and hemostasis achieved in about 10 mins. Pt alert and oriented x4 with no  complaints. Report given to PCN.     Plan:    Per renal team

## 2024-03-11 NOTE — PROGRESS NOTES
[facility]    Progress Note - Chris 1 Teaching Service    Medicine Progress Note       Date of Admission:  2/10/2024    Assessment & Plan   Date of Hospital Admission: 2/10/2024  Date of 1st ICU Admission: 2/18/2024  Date of 1st Transfer to Medicine Floor: 2/20/2024  Date of 2nd ICU Admission: 2/28/2024  Date of 2nd Transfer for Medicine Floor: 2/29/2024     Assessment & Plan  Sofie Rodriguez is a 63 year old female with PMH COPD s/p bilateral lung transplant 6/28/22 c/b hemidiaphragm palsy and recurrent pneumonias, gastroparesis and small bowel dysmotility complicated by severe malnutrition now s/p PEG/J, ESRD on T/Th/Sat HD, recent R femoral fx s/p ORIF, chronic diarrhea, recurrent c-diff, FTT with inability to tolerate any tube feeds, who was admitted to [facility] on 2/10/24 for concerns over malnutrition and TPN initiation via portacath. On 2/17/24 she was transferred to ICU for worsening mental status and acute hypoxic and hypercarbic respiratory failure inspite of BiPAP requiring intubation. She was suspected to have PNA and started on antibiotics. Extubated on 2/19 without complications and tolerated iHD on 2/20, and tolerated PO regular diet in addition to TPN. Developed progressively worsening respiratory acidosis and hypercarbia, and was re-admitted to ICU on 2/28/24 for close monitoring of intermittent BiPAP and possible intubation, however she improved on AVAPS setting and well enough to transfer back to medicine floor on 2/29/24. Currently working on balancing volume status with current TPN plan. RT continue to assess and document details of AVAPS to determine if patient is being underventilated. Tunneled CVC removed on 3/7 without complications.    Changes today:   - refused BiPAP overnight; pCO2 79  -Transplant pulm would like to adjust immunosuppression to see if that improves diarrhea.They would also like to wean off TPN as well to help with  diarrhea  -Pall Care consulted for GOC  -Likely care conference this week (Friday or Wed?)  -Hydroxyzine as needed ordered to assist with anxiety with wearing BiPAP    #Severe respiratory acidosis, improved on BiPAP  #Acute hypoxic and hypercarbic respiratory failure  #S/P Lung transplant 2022 c/b right hemidiaphragm palsy  #Recurrent pneumonia, resolved  Patient received a bilateral lung transplant 6/28/22 for COPD. Was admitted 2/10 to address malnutrition and admitted to ICU on 2/17 with hypoxic hypercarbic respiratory failure. Intubated on 2/18 AM  due to worsening oxygen requirements, likely due to pulmonary edema given hx of ESRD requiring HD vs infection given hx of lung transplant, immunosuppressed status, and recurrent pneumonias. Extubated on 2/19 without complications. Has had issues with rising pCO2 that is responsive to BiPAP, but due to refusal, has required transferred to ICU (on 2/28 AM). Neurology consulted and MRI r/o central cause problem for respiratory drive. Started on AVAPS with improvement in mental status and VBG, and patient transferred back to medicine floor on 2/29.     - PRN hypertonic saline inhaler with albuterol nebs  - Transplant pulmonology following, recs appreciated  - PTA immunosuppressive agent  >Prednisone 5 mg every morning, 2.5 mg every afternoon; Stop tacrolimus (goal 6-8; 4 mg AM/ 3.5mg PM) and start Cyclosporin (3/11-*) to be ordered by Transplant Pulm  > overnight oximetry study suggestive of O2 vs CPAP need, as did require up to 2LPM overnight to prevent hypoxia  > Try to minimize O2 to preserve respiratory drive, will give IVIG for DSA+   - avoid HFNC, maintain minimum oxygen to keep SaO2 >85%   - continue AVAPS when sleeping (overnight and during naps)  - MIP/MEP testing again next week (ordered 3/11)  - Discuss GOC w/ Pall Care; Care conf this week  - Stop PTA dapsone MWF for PJP prophylaxis; Start Bactrim (3/11) to be ordered by Transplant Pulm  - Limit medications  that would depress respiration   - d/c'd theophylline 3/3/24 due to difficulty attaining therapeutic levels (started by ICU)  - continue thyroid function as below  - awaiting metabolic CART study results  - may need BiPAP at home as patient reports not having one. Sleep clinic referral at discharge.   - Contacted lung transplant social work team ([facility]) in regards to patient progression. Pulmonary team concerned she is not stable for discharge at this time due to trending pCO2 levels and not cooperating with BIPAP.     Antibiotics:  Vancomycin (2/17 - 2/17)  Zosyn (2/17 - 2/23) for HAP  Dapsone MWF, PJP ppx   Micafungin (2/18- 2/21)     Immunosuppression  Cyclosporin (previously on [place])  Prednisone     #Severe malnutrition   #FTT   #Hypoalbuminemia  #Gastroparesis, small bowel dysmotility  #S/P PEG/J with intolerance of enteral nutrition  # Chronic osmotic diarrhea/SIBO s/p Rifaximin > improving     Patient with gastroparesis (presumed due to vagal injury) and small bowel dysmotility complicated by unintentional 40lb weight loss over the past year and now severe malnutrition. Previously intolerant to oral food intake due to nausea. Was initially admitted for portacath and TPN initiation since 2/13. After extubation has been able to tolerate feeds without n/v from 2/20. Electrolytes trending towards baseline today.  Per GI, next steps for workup for malnutrition would include CT enterography to evaluate for anatomic abnormalities contributing to malnutrition vs possible treatment for SIBO (though patient has had multiple courses of treatment in the past with no long term improvement). Patient couldn't tolerate the oral load for CT enterography on 2/21, so will defer this until she is able to tolerate greater PO load. On 2/23 , again discussed with patient the need of small bowel motility study if not improving outpatient through [facility]. No ongoing concerns for SIBO on 2/23 as patient is passing multiple episodes of  stools and gas with no abdominal pain or distention. C-diff test negative on 2/21. Per RD, patient tolerating >300 kcal of intake PO currently, so stopped trickle Tube feeds and continuing with PO and TPN for nutrition.  As of 3/11 transplant pulmonology is worried that TPN is also contributing to her respiratory acidosis and would really like to wean off TPN and transition back to food/supplements to see if her clinical status improves.  Will discuss this with nutrition  - Regular diet + increased TPN +  no further tube feeds per RD & transplant pulm discussion    - work onweaning off TPN, transitioning to p.o. food/supplements per transplant pulm request               - Nephrology: limit fluid < 1.5 L per day for TPN ( chart vol of TPN in the I/O). Renal team okay with midline on RUE                - RD concerned pt is not absorbing any oral intake and they will be monitoring Bowel function, I/O and, PO/TF/TPN intake.               -  stopped TF as PO intake sufficient for preventing gut atrophy  - CT enterography with contrast- Pt not able to tolerate oral contrast load of 1500 ml on 2/21; no ongoing concerns for SIBO, would need small bowel motility study if not improving outpatient through [place]. --> assess if we can do this now and have more information for GI      #Acute on chronic anemia 2/2 ESRD  Hgb have been stable 7-8s, with no acute signs of bleeding, acute drop 2/29 from 8.2 > 6.6 after two rechecks, no overt signs of bleeding noted, patient reports some abdominal pain but otherwise physical exam unremarkable.  LUE US negative for DVT 2/18.    - continue epogen per nephrology with dialysis  - venofer 50 mcg qweek with dialysis  - type and screen performed 2/29, 1 unit pRBCs ordered and transfused    #ESRD on HD M/T/Th/Sat  #Hypervolemic hyponatremia  #Anion gap metabolic acidosis - resolved  Patient is ESRD on T/Th/Sat HD as outpt, Hgb stabilizing. HD tolerating well. Continuing monitoring electrolytes  daily. Anion gap normal since 2/21. Will continue to monitor daily labs/ABG  for refeeding syndrome and acidosis.   - Continue Venofer injections    - CBC and CMP daily  - Continue epogen dose 8000 units as per nephrology  - Strict I/Os  - HD T/TH/Sat per nephrology given hypervolemia; Plan for 2hr UF run on M; 3hr on T/Th/S.   - K phos 250 mg bid on 3/4    # Elevated TSH and low T4 and T3  Patient with new low T4 at 0.64 and TSH at 6.5, concerning for new hypothyroidism vs sick thyroid syndrome. TSH with appropriate response, more consistent with elevation in the setting of acute illness. ICU team on 2/28 recommended initiation of treatment for possible hypothyroid as this can improve respiratory muscle function in the setting of weakness and malnutrition.   - continue liothyronine and levothyroxine per ICU team recommendations  - repeat thyroid function 3/7 wnl    #Left upper extremity unilateral edema, improving   #Bilateral pedal and ankle edema, improving  Edema due to third spacing due to hypoalbuminemia vs HF. BNP >02069 at admission, Echo on 2/18 shows EF of 55-60% with collapsible IVC. Extremity edema 2/2 to hypoalbuminemia. Upper limb duplex USG on 2/18 negative for DVT.  USG left arm on 2/21 shows steel physiology and Vascular Surgery consulted (see below). Overall edema in extremities have improved significantly since initiating 4x/wk dialysis.    - Continue daily weights  - Strict I/Os  - Elevation and wrapping of only lower legs as needed and increased UF per nephrology for volume management; no wrapping of Left upper extremity with dialysis fistula  - lymphedema consulted for BLE edema  -HD as noted above      #Steal physiology of LUE dialysis access fistula  LUE arterial US obtained 2/21 with concern for LUE edema. US of fistula demonstrated steal physiology. Discussed with nephrology, and vascular surgery consulted on 2/22. Vascular surgery has only minor concerns for steal symptoms and given that  the AVF is working well, they are okay with outpatient fistulograms/ venoplasty with wrist brachial index and PPG's, unless new concerns arise.  - plan for outpatient workup as above; nephrology in agreement with outpatient workup.     #Facial and neck bruising  #Pain  Reports soreness in her neck from lying in bed. No soreness in the buttocks/ back. Patient has multiple bruises over her arms and face which is attributed to previous falls. No new bruising reported today. Patients reports her headaches are improving with tylenol. Using heating pads and lidocaine patch for body pains. Couple of small skin tears noted by the Rn's. Skin care done accordingly.  - Tylenol Q4  - Lidocaine patch prn  - Heating pads prn  - Diligent skin cares    #HTN  #A-fib, resolved  Noted atrial fibrillation on arrival with HR elevated at 150, not sustained for > 10 minutes and otherwise hemodynamically stable. RVR resolved w/o medications.   - PTA metoprolol 25mg BID - holding for now with lower BP with increased UF per nephrology, resume if becoming more hypertensive  - Continuous telemetry     # Encephalopathy secondary to hypercarbia - resolved  Patient was progressively more lethargic on 2/17 [place] [facility] Etiology likely secondary to hypercarbia in context of respiratory failure as patient was noted to have high CO2s concomitant with lethargy. Though receiving oxycodone and fentanyl, lethargy was only partially alleviated by narcan. LFTs and ammonia wnl with low suspicion of hepatic encephalopathy. BUN wnl with low suspicion for uremic encephalopathy. Head CT on 2/18 was negative with low suspicion of acute intracranial pathology. Patient is awake, alert, oriented and following commands since 2/20.     # Right hip fracture s/p ORIF (December 2023)   - PT/OT    # GERD  - PTA PPI    # Hx EBV viremia   # Hypogammaglobulinemia  # Chronic immunosuppression 2/2 lung transplant  Patient has been afebrile and has  not had leukocytosis however she is under immunosuppression. Given acute respiratory failure and hx of recurrent pneumonias, she was started on empiric abx for concerns over new pneumonia. RVP and cultures no growth to date. Last day of empirical treatment for HAP.  - EBV 27K (decreased compared to prior)     # RLE edema and pain - U/S DVT without DVT     Lines/tubes/drains:  - PIV x2  - PEG/J  - HD AV fistula, left arm   - PICC for TPN        Diet: Renal Diet (dialysis)  parenteral nutrition - ADULT compounded formula CYCLE    DVT Prophylaxis: resume subQ heparin  Dong Catheter: Not present  Fluids: TPN and PO  Lines: PRESENT      PICC 03/04/24 Double Lumen Right Basilic TPN. PICC okay to use.-Site Assessment: WDL  Hemodialysis Vascular Access Arteriovenous graft Superior Arm-Site Assessment: WDL      Cardiac Monitoring: None  Code Status: Full Code      Clinically Significant Risk Factors          # Hypocalcemia: Lowest Ca = 8.3 mg/dL in last 2 days, will monitor and replace as appropriate   # Hypomagnesemia: Lowest Mg = 1.5 mg/dL in last 2 days, will replace as needed   # Hypoalbuminemia: Lowest albumin = 2.6 g/dL at 2/18/2024  5:13 AM, will monitor as appropriate             # Severe Malnutrition: based on nutrition assessment      # Financial/Environmental Concerns: none         Disposition Plan         Betty Diaz MD  [facility]  Securely message with [employer] (more info)  Text page via [facility] Paging/Directory   See signed in provider for up to date coverage information  ______________________________________________________________________    Interval History   No acute events overnight.Refusing BiPAP. pCO2 in the 79.  Patient states that she is open to anxiety medications to help with wearing BiPAP.  Ordered hydroxyzine.  Updated daughter via phone.  Sounds like they had a goals of care discussion amongst themselves.  Patient has mixed feelings.  No acute  complaints from patient.  Palliative care to the patient tomorrow.    Physical Exam   Vital Signs: Temp: 97.4  F (36.3  C) Temp src: Oral BP: (!) 142/64 Pulse: 101   Resp: 18 SpO2: 98 % O2 Device: Nasal cannula Oxygen Delivery: 2 LPM  Weight: 101 lbs 9.6 oz  General: thin, laying in bed comfortably, no acute distress this morning. Very pleasant  HEENT: bruising on the right face around right orbit with hematoma and right neck, improved  Pulm/Resp: breathing comfortably on NC, CTAB   CV: normal rate, regular rhythm. No LE edema  Neuro: alert and following commands, answering questions clearly and easily, moving all extremities.    Medical Decision Making       Please see A&P for additional details of medical decision making.      Data     I have personally reviewed the following data over the past 24 hrs:    N/A  \   N/A   / N/A     141 103 81.3 (H) /  139 (H)   4.3 29 4.04 (H) \     ALT: <5 AST: 19 AP: 100 TBILI: 0.3   ALB: 3.5 TOT PROTEIN: 5.7 (L) LIPASE: N/A     INR:  1.02 PTT:  N/A   D-dimer:  N/A Fibrinogen:  N/A       Imaging results reviewed over the past 24 hrs:   No results found for this or any previous visit (from the past 24 hour(s)).

## 2024-03-12 ENCOUNTER — APPOINTMENT (OUTPATIENT)
Dept: PHYSICAL THERAPY | Facility: CLINIC | Age: 64
DRG: 640 | End: 2024-03-12
Attending: INTERNAL MEDICINE
Payer: MEDICARE

## 2024-03-12 LAB
ALBUMIN SERPL BCG-MCNC: 3.3 G/DL (ref 3.5–5.2)
ALP SERPL-CCNC: 98 U/L (ref 40–150)
ALT SERPL W P-5'-P-CCNC: <5 U/L (ref 0–50)
ANION GAP SERPL CALCULATED.3IONS-SCNC: 13 MMOL/L (ref 7–15)
AST SERPL W P-5'-P-CCNC: 19 U/L (ref 0–45)
BASE EXCESS BLDV CALC-SCNC: -3.2 MMOL/L (ref -3–3)
BASOPHILS # BLD AUTO: ABNORMAL 10*3/UL
BASOPHILS # BLD MANUAL: 0 10E3/UL (ref 0–0.2)
BASOPHILS NFR BLD AUTO: ABNORMAL %
BASOPHILS NFR BLD MANUAL: 0 %
BILIRUB SERPL-MCNC: 0.3 MG/DL
BUN SERPL-MCNC: 105 MG/DL (ref 8–23)
C DIFF GDH STL QL IA: POSITIVE
C DIFF TOX A+B STL QL IA: POSITIVE
C DIFF TOX B STL QL: POSITIVE
CALCIUM SERPL-MCNC: 8.8 MG/DL (ref 8.8–10.2)
CHLORIDE SERPL-SCNC: 102 MMOL/L (ref 98–107)
CREAT SERPL-MCNC: 4.84 MG/DL (ref 0.51–0.95)
DEPRECATED HCO3 PLAS-SCNC: 24 MMOL/L (ref 22–29)
EGFRCR SERPLBLD CKD-EPI 2021: 10 ML/MIN/1.73M2
EOSINOPHIL # BLD AUTO: ABNORMAL 10*3/UL
EOSINOPHIL # BLD MANUAL: 0 10E3/UL (ref 0–0.7)
EOSINOPHIL NFR BLD AUTO: ABNORMAL %
EOSINOPHIL NFR BLD MANUAL: 0 %
ERYTHROCYTE [DISTWIDTH] IN BLOOD BY AUTOMATED COUNT: 20.3 % (ref 10–15)
GLUCOSE SERPL-MCNC: 155 MG/DL (ref 70–99)
HCO3 BLDV-SCNC: 27 MMOL/L (ref 21–28)
HCT VFR BLD AUTO: 32.1 % (ref 35–47)
HGB BLD-MCNC: 9.7 G/DL (ref 11.7–15.7)
IMM GRANULOCYTES # BLD: ABNORMAL 10*3/UL
IMM GRANULOCYTES NFR BLD: ABNORMAL %
LYMPHOCYTES # BLD AUTO: ABNORMAL 10*3/UL
LYMPHOCYTES # BLD MANUAL: 1 10E3/UL (ref 0.8–5.3)
LYMPHOCYTES NFR BLD AUTO: ABNORMAL %
LYMPHOCYTES NFR BLD MANUAL: 16 %
MAGNESIUM SERPL-MCNC: 2 MG/DL (ref 1.7–2.3)
MCH RBC QN AUTO: 37.5 PG (ref 26.5–33)
MCHC RBC AUTO-ENTMCNC: 30.2 G/DL (ref 31.5–36.5)
MCV RBC AUTO: 124 FL (ref 78–100)
METAMYELOCYTES # BLD MANUAL: 0.1 10E3/UL
METAMYELOCYTES NFR BLD MANUAL: 1 %
MONOCYTES # BLD AUTO: ABNORMAL 10*3/UL
MONOCYTES # BLD MANUAL: 0.4 10E3/UL (ref 0–1.3)
MONOCYTES NFR BLD AUTO: ABNORMAL %
MONOCYTES NFR BLD MANUAL: 6 %
NEUTROPHILS # BLD AUTO: ABNORMAL 10*3/UL
NEUTROPHILS # BLD MANUAL: 4.9 10E3/UL (ref 1.6–8.3)
NEUTROPHILS NFR BLD AUTO: ABNORMAL %
NEUTROPHILS NFR BLD MANUAL: 77 %
NRBC # BLD AUTO: 0 10E3/UL
NRBC BLD AUTO-RTO: 0 /100
O2/TOTAL GAS SETTING VFR VENT: 20 %
OXYHGB MFR BLDV: 86 % (ref 70–75)
PCO2 BLDV: 77 MM HG (ref 40–50)
PH BLDV: 7.15 [PH] (ref 7.32–7.43)
PHOSPHATE SERPL-MCNC: 4.8 MG/DL (ref 2.5–4.5)
PLAT MORPH BLD: ABNORMAL
PLATELET # BLD AUTO: 227 10E3/UL (ref 150–450)
PO2 BLDV: 65 MM HG (ref 25–47)
POLYCHROMASIA BLD QL SMEAR: ABNORMAL
POTASSIUM SERPL-SCNC: 4.2 MMOL/L (ref 3.4–5.3)
PROT SERPL-MCNC: 5.6 G/DL (ref 6.4–8.3)
RBC # BLD AUTO: 2.59 10E6/UL (ref 3.8–5.2)
RBC MORPH BLD: ABNORMAL
SAO2 % BLDV: 88.2 % (ref 70–75)
SODIUM SERPL-SCNC: 139 MMOL/L (ref 135–145)
WBC # BLD AUTO: 6.3 10E3/UL (ref 4–11)

## 2024-03-12 PROCEDURE — 99233 SBSQ HOSP IP/OBS HIGH 50: CPT | Mod: FS | Performed by: PHYSICIAN ASSISTANT

## 2024-03-12 PROCEDURE — 85007 BL SMEAR W/DIFF WBC COUNT: CPT | Performed by: STUDENT IN AN ORGANIZED HEALTH CARE EDUCATION/TRAINING PROGRAM

## 2024-03-12 PROCEDURE — 250N000012 HC RX MED GY IP 250 OP 636 PS 637: Performed by: INTERNAL MEDICINE

## 2024-03-12 PROCEDURE — 99222 1ST HOSP IP/OBS MODERATE 55: CPT | Mod: 24 | Performed by: INTERNAL MEDICINE

## 2024-03-12 PROCEDURE — 84100 ASSAY OF PHOSPHORUS: CPT | Performed by: STUDENT IN AN ORGANIZED HEALTH CARE EDUCATION/TRAINING PROGRAM

## 2024-03-12 PROCEDURE — 120N000002 HC R&B MED SURG/OB UMMC

## 2024-03-12 PROCEDURE — 97110 THERAPEUTIC EXERCISES: CPT | Mod: GP

## 2024-03-12 PROCEDURE — 99233 SBSQ HOSP IP/OBS HIGH 50: CPT | Mod: 24 | Performed by: STUDENT IN AN ORGANIZED HEALTH CARE EDUCATION/TRAINING PROGRAM

## 2024-03-12 PROCEDURE — 258N000003 HC RX IP 258 OP 636: Performed by: INTERNAL MEDICINE

## 2024-03-12 PROCEDURE — 82805 BLOOD GASES W/O2 SATURATION: CPT

## 2024-03-12 PROCEDURE — 99207 PR NO CHARGE NEW CONSULT PS: CPT | Performed by: STUDENT IN AN ORGANIZED HEALTH CARE EDUCATION/TRAINING PROGRAM

## 2024-03-12 PROCEDURE — 97530 THERAPEUTIC ACTIVITIES: CPT | Mod: GP

## 2024-03-12 PROCEDURE — 250N000012 HC RX MED GY IP 250 OP 636 PS 637

## 2024-03-12 PROCEDURE — 80053 COMPREHEN METABOLIC PANEL: CPT

## 2024-03-12 PROCEDURE — 250N000009 HC RX 250: Performed by: STUDENT IN AN ORGANIZED HEALTH CARE EDUCATION/TRAINING PROGRAM

## 2024-03-12 PROCEDURE — 634N000001 HC RX 634: Mod: JZ | Performed by: INTERNAL MEDICINE

## 2024-03-12 PROCEDURE — 87493 C DIFF AMPLIFIED PROBE: CPT

## 2024-03-12 PROCEDURE — 250N000013 HC RX MED GY IP 250 OP 250 PS 637

## 2024-03-12 PROCEDURE — 36415 COLL VENOUS BLD VENIPUNCTURE: CPT

## 2024-03-12 PROCEDURE — 250N000009 HC RX 250

## 2024-03-12 PROCEDURE — 250N000013 HC RX MED GY IP 250 OP 250 PS 637: Performed by: STUDENT IN AN ORGANIZED HEALTH CARE EDUCATION/TRAINING PROGRAM

## 2024-03-12 PROCEDURE — 87324 CLOSTRIDIUM AG IA: CPT

## 2024-03-12 PROCEDURE — 99232 SBSQ HOSP IP/OBS MODERATE 35: CPT | Mod: GC | Performed by: STUDENT IN AN ORGANIZED HEALTH CARE EDUCATION/TRAINING PROGRAM

## 2024-03-12 PROCEDURE — 90935 HEMODIALYSIS ONE EVALUATION: CPT

## 2024-03-12 PROCEDURE — 83735 ASSAY OF MAGNESIUM: CPT | Performed by: STUDENT IN AN ORGANIZED HEALTH CARE EDUCATION/TRAINING PROGRAM

## 2024-03-12 PROCEDURE — 250N000009 HC RX 250: Performed by: INTERNAL MEDICINE

## 2024-03-12 PROCEDURE — 99233 SBSQ HOSP IP/OBS HIGH 50: CPT | Mod: 24 | Performed by: INTERNAL MEDICINE

## 2024-03-12 PROCEDURE — 36592 COLLECT BLOOD FROM PICC: CPT

## 2024-03-12 PROCEDURE — 85027 COMPLETE CBC AUTOMATED: CPT | Performed by: STUDENT IN AN ORGANIZED HEALTH CARE EDUCATION/TRAINING PROGRAM

## 2024-03-12 PROCEDURE — 250N000011 HC RX IP 250 OP 636

## 2024-03-12 RX ORDER — POLYETHYLENE GLYCOL 3350 17 G/17G
238 POWDER, FOR SOLUTION ORAL ONCE
Status: DISCONTINUED | OUTPATIENT
Start: 2024-03-12 | End: 2024-03-12

## 2024-03-12 RX ADMIN — CYANOCOBALAMIN TAB 500 MCG 500 MCG: 500 TAB at 11:27

## 2024-03-12 RX ADMIN — LOPERAMIDE HYDROCHLORIDE 2 MG: 2 CAPSULE ORAL at 20:53

## 2024-03-12 RX ADMIN — SODIUM CHLORIDE 300 ML: 9 INJECTION, SOLUTION INTRAVENOUS at 07:56

## 2024-03-12 RX ADMIN — PREDNISONE 5 MG: 5 TABLET ORAL at 11:27

## 2024-03-12 RX ADMIN — Medication 40 MG: at 20:53

## 2024-03-12 RX ADMIN — LEVALBUTEROL HYDROCHLORIDE 1.25 MG: 1.25 SOLUTION RESPIRATORY (INHALATION) at 20:51

## 2024-03-12 RX ADMIN — Medication 2.5 MCG: at 20:53

## 2024-03-12 RX ADMIN — Medication: at 07:56

## 2024-03-12 RX ADMIN — SODIUM CHLORIDE 250 ML: 9 INJECTION, SOLUTION INTRAVENOUS at 07:56

## 2024-03-12 RX ADMIN — HEPARIN SODIUM 5000 UNITS: 5000 INJECTION, SOLUTION INTRAVENOUS; SUBCUTANEOUS at 05:46

## 2024-03-12 RX ADMIN — CALCIUM CARBONATE 600 MG (1,500 MG)-VITAMIN D3 400 UNIT TABLET 1 TABLET: at 18:08

## 2024-03-12 RX ADMIN — CYCLOSPORINE 125 MG: 100 SOLUTION ORAL at 18:09

## 2024-03-12 RX ADMIN — LEVOTHYROXINE SODIUM 25 MCG: 0.03 TABLET ORAL at 05:46

## 2024-03-12 RX ADMIN — MAGNESIUM SULFATE HEPTAHYDRATE: 500 INJECTION, SOLUTION INTRAMUSCULAR; INTRAVENOUS at 20:53

## 2024-03-12 RX ADMIN — CYCLOSPORINE 125 MG: 100 SOLUTION ORAL at 11:27

## 2024-03-12 RX ADMIN — Medication 5 MG: at 23:11

## 2024-03-12 RX ADMIN — LIDOCAINE 4% 1 PATCH: 40 PATCH TOPICAL at 20:54

## 2024-03-12 RX ADMIN — LIDOCAINE: 40 CREAM TOPICAL at 07:09

## 2024-03-12 RX ADMIN — CALCIUM CARBONATE 600 MG (1,500 MG)-VITAMIN D3 400 UNIT TABLET 1 TABLET: at 11:26

## 2024-03-12 RX ADMIN — Medication 40 MG: at 11:26

## 2024-03-12 RX ADMIN — EPOETIN ALFA-EPBX 8000 UNITS: 10000 INJECTION, SOLUTION INTRAVENOUS; SUBCUTANEOUS at 10:38

## 2024-03-12 RX ADMIN — CALCIUM CARBONATE 600 MG (1,500 MG)-VITAMIN D3 400 UNIT TABLET 1 TABLET: at 14:41

## 2024-03-12 RX ADMIN — SMOFLIPID 250 ML: 6; 6; 5; 3 INJECTION, EMULSION INTRAVENOUS at 20:53

## 2024-03-12 RX ADMIN — HEPARIN SODIUM 5000 UNITS: 5000 INJECTION, SOLUTION INTRAVENOUS; SUBCUTANEOUS at 18:08

## 2024-03-12 RX ADMIN — Medication 2.5 MCG: at 10:40

## 2024-03-12 RX ADMIN — PREDNISONE 2.5 MG: 2.5 TABLET ORAL at 20:53

## 2024-03-12 ASSESSMENT — ACTIVITIES OF DAILY LIVING (ADL)
ADLS_ACUITY_SCORE: 36
ADLS_ACUITY_SCORE: 33
ADLS_ACUITY_SCORE: 36
ADLS_ACUITY_SCORE: 30
ADLS_ACUITY_SCORE: 35
ADLS_ACUITY_SCORE: 35
ADLS_ACUITY_SCORE: 30
ADLS_ACUITY_SCORE: 30
ADLS_ACUITY_SCORE: 36
ADLS_ACUITY_SCORE: 36
ADLS_ACUITY_SCORE: 33
ADLS_ACUITY_SCORE: 36
ADLS_ACUITY_SCORE: 35
ADLS_ACUITY_SCORE: 36

## 2024-03-12 NOTE — PLAN OF CARE
Goal Outcome Evaluation:      Plan of Care Reviewed With: patient    Overall Patient Progress: no change    Outcome Evaluation: Pt A&O. VSS on RA. Up w/ SBA. Denies pain. Had HD today. Meds given through J. G clamped. Good appetite. R DL PICC-SL, blood return noted. Worked with therapy. Needs stool sample. Plan for colonscopy on Thursday 3/14 and care conference this week.

## 2024-03-12 NOTE — CONSULTS
This note is to acknowledge receiving the reconsult for Sofie. Please refer to progress note from today 3/12 for more details.    This is a non-billable note    Primitivo Pink DO / Internal and Palliative Medicine   Securely message with the Voc[employer] Web Console (learn more here)   Text page via [facility] Paging/Directory

## 2024-03-12 NOTE — CONSULTS
GASTROENTEROLOGY CONSULTATION      Date of Admission:  2/10/2024          ASSESSMENT AND RECOMMENDATIONS:     63 year old female with a history of COPD s/p Ltx in 2022 complicated by recurrent PNAs, EBV viremia, severe gastroparesis s/p GJ tube placement (07/2022), pyloric botox, and G-POEM, chronic diarrhea, recurrent c.diff colitis, ESRD on iHD, admitted for FTT currently on TPN. GI consulted for possible colonoscopy for evaluation of chronic diarrhea.    #Chronic diarrhea  #S/p Ltx in 2022  #History of SIBO/chronic osmotic diarrhea  #Gastroparesis s/p GJ tube placement, G-POEM without improvement     Negative c.diff 02/21/2024. Normal TSH recently, normal celiac serologies 05/2023. History of CMV viremia but most recent 02/19/2024 was negative. Currently on TPN, off tube feeds with persistent diarrhea. Previously thought to have tube feeding related diarrhea and intolerance in the setting of small bowel dysmotility. Has a history of SIBO in the past treated with several antibiotic regimens. 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 Previously recommended CT enterography to look for any structural abnormalities, but holding off at this time.     Patient's diarrhea likely osmotic diarrhea in the setting of small bowel dysmotility. Could also be in the setting of SIBO, as she has previously tested positive on 09/2023. Previously attempted Cipro 500 mg BID treatment for 14 days that didn't help, but then Rifaximin treatment was helpful.    RECOMMENDATIONS    - Please check enteric stool panel, c.diff.   - Consider empiric treatment for SIBO.   - Tentative plan for colonoscopy vs flexible sigmoidoscopy later this week.  - GI will continue to follow.     Gastroenterology follow up recommendations: TBD    Thank you for involving us in this patient's care. Please do not  hesitate to contact the GI service with any questions or concerns.     Patient care plan discussed with Dr. Staples, GI staff physician.    Rodger Ku MD  Gastroenterology Fellow  Pager             Chief Complaint:   We were asked by Medicine service to evaluate this patient with chronic diarrhea    History is obtained from the patient and the medical record.          History of Present Illness:   Sofie Rodriguez is a 63 year old female with a history of COPD s/p Ltx in 2022 complicated by recurrent PNAs, EBV viremia, severe gastroparesis s/p GJ tube placement (07/2022), pyloric botox, and G-POEM, chronic diarrhea, recurrent c.diff colitis, ESRD on iHD, admitted for FTT currently on TPN. GI consulted for possible colonoscopy for evaluation of chronic diarrhea.    Patient with very extensive history of GI symptoms related to her post lung transplant course with severe gastroparesis, small bowel dysmotility, SIBO, tube feeding intolerance, now TPN dependence. Now struggling with failure to thrive, on TPN for the past 2 weeks. Plan for care conference later this week.     Patient seen this afternoon, she is very pleasant, just finished her physical therapy. Reports ongoing diarrhea with 5-6 watery, loose bowel movements on a daily basis. Denies any blood in her stool. Has been losing a lot of weight, around 40 lbs overall. Able to eat and drink (pleasure feeds) but off tube feeds for the past 2 weeks.             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    Hepatitis 2017    Hep C, [facility]    History of blood transfusion     HTN (hypertension)     Infectious mononucleosis     Lung infection 11/30/2022    Osteopenia              Past Surgical History:   Reviewed and edited as appropriate   Past Surgical History:   Procedure Laterality Date    BRONCHOSCOPY (RIGID OR FLEXIBLE), DIAGNOSTIC N/A 08/02/2022    Procedure: BRONCHOSCOPY, DIAGNOSTIC- inspection Bronch;  Surgeon: Kamala Lovell MD;  Location: [facility]    BRONCHOSCOPY (RIGID OR FLEXIBLE), DIAGNOSTIC N/A 09/13/2022    Procedure: INSPECTION BRONCHOSCOPY, WITH BRONCHOALVEOLAR LAVAGE;  Surgeon: Jose R Mccullough MD;  [facility]    BRONCHOSCOPY (RIGID OR FLEXIBLE), DIAGNOSTIC N/A 11/09/2022    Procedure: BRONCHOSCOPY, WITH BRONCHOALVEOLAR LAVAGE AND BIOPSY;  Surgeon: Cesar Lima MD;  [facility]    BRONCHOSCOPY (RIGID OR FLEXIBLE), DIAGNOSTIC N/A 01/25/2023    Procedure: BRONCHOSCOPY, WITH BRONCHOALVEOLAR LAVAGE AND BIOPSY;  Surgeon: Mason Reddy MD;  [facility]: [facility]    BRONCHOSCOPY (RIGID OR FLEXIBLE), DIAGNOSTIC N/A 04/19/2023    Procedure: BRONCHOSCOPY, WITH BRONCHOALVEOLAR LAVAGE AND BIOPSY;  Surgeon: Kamala Lovell MD;  [facility]    BRONCHOSCOPY (RIGID OR FLEXIBLE), DIAGNOSTIC N/A 07/12/2023    Procedure: BRONCHOSCOPY, WITH BRONCHOALVEOLAR LAVAGE AND BIOPSY;  Surgeon: Cesar Lima MD;  [facility]    BRONCHOSCOPY FLEXIBLE AND RIGID N/A 07/19/2022    Procedure: BRONCHOSCOPY inspection only;  Surgeon: Bob Liao MD;  Location:  GI    COLONOSCOPY  2015    CORONARY ANGIOGRAPHY ADULT ORDER      CV CORONARY ANGIOGRAM N/A 06/30/2021    Procedure: CV CORONARY ANGIOGRAM;  Surgeon: Alexander Cuellar MD;  Location:  [facility]    CV RIGHT HEART CATH MEASUREMENTS RECORDED N/A 06/30/2021    Procedure: CV RIGHT HEART CATH;  Surgeon: Alexander Cuellar MD;  Location:  [facility]    ENDOSCOPIC PERORAL MYOTOMY N/A 10/09/2023    Procedure: MYOTOMY, ESOPHAGUS, ENDOSCOPIC, ORAL APPROACH;  Surgeon: Gonzalez Navarro MD;  [facility]    ENDOSCOPIC RETROGRADE CHOLANGIOPANCREATOGRAM N/A 08/11/2022    Procedure: ENDOSCOPIC  RETROGRADE CHOLANGIOPANCREATOGRAPHY WITH PANCREATIC DUCT NEEDLE KNIFE AND STENT PLACEMENT, BILE DUCT SPHINCTEROTOMY, BLOOD/DEBRIS REMOVAL AND STENT PLACEMENT;  Surgeon: Cosmo Arroyo MD;  [facility]    ENDOSCOPIC RETROGRADE CHOLANGIOPANCREATOGRAM N/A 10/07/2022    Procedure: ENDOSCOPIC RETROGRADE CHOLANGIOPANCREATOGRAPHY with biliary and pancreatic stent removal, debris removal;  Surgeon: Cosmo Arroyo MD;  [facility]    ENT SURGERY  1974    tonsillectomy    ENTEROSCOPY SMALL BOWEL N/A 08/11/2022    Procedure: SMALL BOWEL ENTEROSCOPY;  Surgeon: Cosmo Arroyo MD;  [facility]    ESOPHAGOGASTRODUODENOSCOPY, WITH NASOGASTRIC TUBE INSERTION N/A 07/01/2022    Procedure: ESOPHAGOGASTRODUODENOSCOPY, WITH NASOJEJUNAL TUBE INSERTION;  Surgeon: Ozzy Nickerson MD;  [facility]    ESOPHAGOSCOPY, GASTROSCOPY, DUODENOSCOPY (EGD), COMBINED N/A 08/03/2022    Procedure: ESOPHAGOGASTRODUODENOSCOPY (EGD);  Surgeon: Ira Andres MD;  [facility]    ESOPHAGOSCOPY, GASTROSCOPY, DUODENOSCOPY (EGD), COMBINED N/A 01/25/2023    Procedure: ESOPHAGOGASTRODUODENOSCOPY (EGD) with botox injection;  Surgeon: Gonzalez Navarro MD;  [facility]    ESOPHAGOSCOPY, GASTROSCOPY, DUODENOSCOPY (EGD), COMBINED N/A 10/09/2023    Procedure: ESOPHAGOGASTRODUODENOSCOPY;  Surgeon: Gonzalez Navarro MD;  [facility]    HAND SURGERY      INSERT CHEST TUBE Right 09/13/2022    Procedure: Insert chest tube;  Surgeon: Jose R Mccullough MD;  [facility]    IR CVC TUNNEL PLACEMENT > 5 YRS OF AGE  09/26/2022    IR CVC TUNNEL PLACEMENT > 5 YRS OF AGE  02/14/2024    IR CVC TUNNEL REMOVAL RIGHT  3/6/2024    IR GASTRO JEJUNOSTOMY TUBE CHANGE  08/31/2022    IR GASTRO JEJUNOSTOMY TUBE CHANGE  12/21/2022    IR GASTRO JEJUNOSTOMY TUBE CHANGE  07/12/2023    IR GASTRO JEJUNOSTOMY TUBE CHANGE  08/18/2023    IR GASTRO JEJUNOSTOMY TUBE CHANGE  11/14/2023    IR GASTRO JEJUNOSTOMY TUBE PLACEMENT  07/27/2022     IR THORACENTESIS  08/29/2022    LEEP TX, CERVICAL  04/07/2017    HECTOR III    LYMPH NODE BIOPSY Left 2005    Left axilla, benign- [facility]    MIDLINE INSERTION - DOUBLE LUMEN Left 07/28/2022    20cm, Basilic vein    PICC DOUBLE LUMEN PLACEMENT Right 03/04/2024    5FR DL PICc, basiliv vein- L-36cm, 1cm out.    REPLACE GASTROJEJUNOSTOMY TUBE, PERCUTANEOUS  10/07/2022    Procedure: Replace Gastrojejunostomy Tube;  Surgeon: Cosmo Arroyo MD;  [facility]    THORACENTESIS Left 08/29/2022    Procedure: THORACENTESIS;  Surgeon: Bo Capone PA-C;  Location: [facility]    THORACENTESIS Left 09/13/2022    Procedure: Thoracentesis;  Surgeon: Jose R Mccullough MD;  [facility]    THROMBECTOMY UPPER EXTREMITY Left 07/02/2022    Procedure: LEFT RADIAL ARM THROMBECTOMY;  Surgeon: Christie Graham MD;  [facility]    TRANSPLANT LUNG RECIPIENT SINGLE X2 Bilateral 06/28/2022    Procedure: Clamshell Incision, Bilateral Sequential Lung Transplant, On Cardiopulmonary Bypass, Flexible Bronchoscopy;  Surgeon: Sue Sunshine MD;  [facility]            Previous Endoscopy:   No results found. However, due to the size of the patient record, not all encounters were searched. Please check Results Review for a complete set of results.         Social History:   Reviewed and edited as appropriate  Social History     Socioeconomic History    Marital status:      Spouse name: Not on file    Number of children: Not on file    Years of education: Not on file    Highest education level: Not on file   Occupational History    Not on file   Tobacco Use    Smoking status: Former     Packs/day: 0.50     Years: 30.00     Additional pack years: 0.00     Total pack years: 15.00     Types: Cigarettes     Quit date: 11/11/2020     Years since quitting: 3.3    Smokeless tobacco: Never   Substance and Sexual Activity    Alcohol use: Not Currently     Comment: Stopped drinking in 2020    Drug use: Not Currently      Types: Marijuana, Methamphetamines     Comment: hx:marijuana and methamphetamine-quit both unsure ?  2-3 yrs ago    Sexual activity: Not on file   Other Topics Concern    Parent/sibling w/ CABG, MI or angioplasty before 65F 55M? Not Asked   Social History Narrative    Not on file     Social Determinants of Health     Financial Resource Strain: Not on file   Food Insecurity: Not on file   Transportation Needs: Not on file   Physical Activity: Not on file   Stress: Not on file   Social Connections: Not on file   Interpersonal Safety: Not At Risk (9/1/2023)    Humiliation, Afraid, Rape, and Kick questionnaire     Fear of Current or Ex-Partner: No     Emotionally Abused: No     Physically Abused: No     Sexually Abused: No   Housing Stability: Not on file            Family History:   Reviewed and edited as appropriate  No known history of gastrointestinal/liver disease or  gastrointestinal malignancies       Allergies:   Reviewed and edited as appropriate     Allergies   Allergen Reactions    Blood Transfusion Related (Informational Only) Other (See Comments)     Patient has a history of a clinically significant antibody against RBC antigens.  A delay in compatible RBCs may occur.     No Clinical Screening - See Comments Other (See Comments)     Patient has a history of a clinically significant antibody against RBC antigens.  A delay in compatible RBCs may occur.    Azithromycin Rash     12/1/22: Developed a rash that is not raised and looks diffuse in nature. It started in the groin and up the back and has now worked its way up her chest into her face. Pt states that it has now started to itch. She is breathing and talking normally and denies any airway changes. Unclear what started rash. Pt noted feeling somewhat itchy yesterday.    Ganciclovir Rash     12/1/22: Developed a rash that is not raised and looks diffuse in nature. It started in the groin and up the back and has now worked its way up her chest into her  face. Pt states that it has now started to itch. She is breathing and talking normally and denies any airway changes. Unclear what started rash. Pt noted feeling somewhat itchy yesterday.            Medications:     Current Facility-Administered Medications   Medication    acetaminophen (TYLENOL) tablet 975 mg    albumin human 5 % injection  mL    albuterol (PROVENTIL) neb solution 2.5 mg    calcium carbonate suspension 1,250 mg    calcium carbonate-vitamin D (CALTRATE) 600-10 MG-MCG per tablet 1 tablet    carboxymethylcellulose PF (REFRESH PLUS) 0.5 % ophthalmic solution 1 drop    cyanocobalamin (VITAMIN B-12) tablet 500 mcg    cycloSPORINE modified (GENERIC EQUIVALENT) microemulsion solution 125 mg    dextrose 10% infusion    glucose gel 15-30 g    Or    dextrose 50 % injection 25-50 mL    Or    glucagon injection 1 mg    diphenhydrAMINE (BENADRYL) capsule 50 mg    Or    diphenhydrAMINE (BENADRYL) injection 50 mg    heparin ANTICOAGULANT injection 5,000 Units    hydrOXYzine HCl (ATARAX) half-tab 5 mg    levalbuterol (XOPENEX) neb solution 1.25 mg    levothyroxine (SYNTHROID/LEVOTHROID) tablet 25 mcg    Lidocaine (LIDOCARE) 4 % Patch 1 patch    lidocaine (LMX4) cream    lidocaine 1 % 0.1-1 mL    lidocaine 1 % 0.1-1 mL    lidocaine-prilocaine (EMLA) cream    liothyronine (CYTOMEL) half-tab 2.5 mcg    lipids 4 oil (SMOFLIPID) 20 % infusion 250 mL    loperamide (IMODIUM) capsule 2 mg    [Held by provider] metoprolol (FIRST-METOPROLOL) solution 25 mg    naloxone (NARCAN) injection 0.2 mg    Or    naloxone (NARCAN) injection 0.4 mg    Or    naloxone (NARCAN) injection 0.2 mg    Or    naloxone (NARCAN) injection 0.4 mg    No lozenges or gum should be given while patient on BIPAP/AVAPS/AVAPS AE    ondansetron (ZOFRAN ODT) ODT tab 4 mg    Or    ondansetron (ZOFRAN) injection 4 mg    pantoprazole (PROTONIX) 2 mg/mL suspension 40 mg    parenteral nutrition - ADULT compounded formula CYCLE    parenteral nutrition -  "ADULT compounded formula CYCLE    Patient may continue current oral medications    predniSONE (DELTASONE) tablet 5 mg    And    predniSONE (DELTASONE) tablet 2.5 mg    senna-docusate (SENOKOT-S/PERICOLACE) 8.6-50 MG per tablet 1 tablet    Or    senna-docusate (SENOKOT-S/PERICOLACE) 8.6-50 MG per tablet 2 tablet    [Held by provider] sevelamer carbonate (RENVELA) Packet 0.8 g    sodium chloride (NEBUSAL) 3 % neb solution 3 mL    sodium chloride (PF) 0.9% PF flush 10 mL    sodium chloride (PF) 0.9% PF flush 10-20 mL    sodium chloride (PF) 0.9% PF flush 3 mL    sodium chloride 0.9 % bag TABLE SOLN    [START ON 3/13/2024] sulfamethoxazole-trimethoprim (BACTRIM) 400-80 MG per tablet 1 tablet             Review of Systems:     A complete review of systems was performed and is negative except as noted in the HPI           Physical Exam:   BP (!) 146/77   Pulse 92   Temp 98.1  F (36.7  C) (Oral)   Resp 16   Ht 1.57 m (5' 1.81\")   Wt 43.4 kg (95 lb 10.9 oz)   SpO2 97%   BMI 17.61 kg/m    Wt:   Wt Readings from Last 2 Encounters:   03/12/24 43.4 kg (95 lb 10.9 oz)   02/07/24 46.7 kg (102 lb 14.4 oz)      Constitutional: cooperative, pleasant, not dyspneic/diaphoretic, no acute distress  Eyes: Sclera anicteric/injected  Ears/nose/mouth/throat: Normal oropharynx without ulcers or exudate, mucus membranes moist  Neck: supple  CV: RRR, No edema  Respiratory: Unlabored breathing  Abdomen: Nondistended, +bs, no hepatosplenomegaly, nontender, no peritoneal signs  Skin: warm, perfused, no jaundice  Neuro: AAO x 3, No asterixis  Psych: Normal affect  MSK: Normal gait         Data:   Labs and imaging below were independently reviewed and interpreted    BMP  Recent Labs   Lab 03/12/24  0613 03/11/24  0557 03/10/24  0608 03/09/24  2125 03/09/24  0623    141 138  --  138   POTASSIUM 4.2 4.3 3.9  --  3.8   CHLORIDE 102 103 101  --  101   ESTUARDO 8.8 8.9 8.3*  --  8.9   CO2 24 29 30*  --  25   .0* 81.3* 48.2*  --  77.7* "   CR 4.84* 4.04* 2.68*  --  3.77*   * 139* 155* 116* 127*     CBC  Recent Labs   Lab 03/12/24  0613 03/10/24  0608 03/09/24  0623 03/08/24  0555   WBC 6.3 6.3 6.2 5.8   RBC 2.59* 2.69* 2.56* 2.52*   HGB 9.7* 9.7* 9.5* 9.0*   HCT 32.1* 33.1* 32.0* 31.3*   * 123* 125* 124*   MCH 37.5* 36.1* 37.1* 35.7*   MCHC 30.2* 29.3* 29.7* 28.8*   RDW 20.3* 21.0* 21.5* 21.7*    217 229 212     INR  Recent Labs   Lab 03/11/24  0536   INR 1.02     LFTs  Recent Labs   Lab 03/12/24  0613 03/11/24  0557 03/10/24  0608 03/09/24  0623   ALKPHOS 98 100 92 88   AST 19 19 23 16   ALT <5 <5 <5 <5   BILITOTAL 0.3 0.3 0.2 0.2   PROTTOTAL 5.6* 5.7* 5.5* 5.7*   ALBUMIN 3.3* 3.5 3.2* 3.4*      PANCNo lab results found in last 7 days.    Imaging:

## 2024-03-12 NOTE — PROGRESS NOTES
Nephrology Progress Note  03/12/2024         Assessment & Recommendations:   Sofie Rodriguez is a 63 year old year old female with ESKD, COPD s/p b/l lung transplant in 6/2022 with multiple complications, gastroparesis s/p GJ tube, GIB, chronic diarrhea, recurrent c-diff, FTT with inability to tolerate any tube feeds, R hip fx s/p ORIF 12/2023, admitted on 2/10 for initiation of TPN/lipids, transferred to ICU on 2/17 with worsening mental status and respiratory acidosis in spite of bipap, ultimately intubated on 2/18, being treated for PNA, also with volume overload in setting of high volume TPN. Persistent respiratory acidosis in spite of volume off, transferred to ICU for hypotension and respiratory failure, improved on bipap, now floor status again 3/1    # ESKD - TTS, LUE AVG, 3hr, 45.5kg (will be lowered), [facility], Dr. Andres Fairbanks. She has been losing weight and now even below her recent set EDW.  - Pt is now agreeable to 4x/week dialysis (3 hrs TTS and 2 hrs Monday's)  - appreciate care coordination to assure OP HD can accommodate 4x/week HD  - Requires EMLA cream an hour before HD  - please avoid PICC which will compromise future dialysis accesses; a midline is much preferred for this patient       # Dialysis access: LUE AVG placed 3-4 months ago, per pt. She had arm swelling and a fistulogram a couple months ago and swelling improved but now has redeveloped.  - US with c/f possible steal syndrome  - per vascular surgery, no concern for steal syndrome, ok to go ahead with fistulogram  - given that AVF is working well on dialysis (450 BFR) and at this time IR is only able to perform fistulograms when AVF isn't working well, will defer to OP for management.    # Persistent respiratory acidosis:    - pt doesn't tolerated bipap and often refuses to wear  - transplant pulm following    # Volume/BP: EDW 45.5 kg. Anuric; on metoprolol soln 25 mg bid (held)  - gently challenging EDW as able  - please  "record TPN in I/O's  - appreciate condensing TPN (currently 400-600 ml per day)       # Nutrition: on TPN and regular diet  - per team, concern is that pt isn't absorbing much PO or tube feeds with diarrhea increasing significantly with increase in tube feeds; thus needing TPN     # Anemia 2/2 ESKD  On Venofer 50 qwk, Mircera last dose 1/9/2024  - hgb 9's  - Will continue Venofer 50 mcg q week (Tuesday)  - continue epogen 8000 units via HD    # BMD: Ca 8-9's, phos 4.8, alb 3.3  - renvela is held   - Phosphorus was added to TPN     Recommendations were communicated to primary team via note     RABIA Moctezuma   Division of Renal Disease and Hypertension  [phone]    Interval History :   Seen on dialysis, UF goal 2 kg, tolerating well. No n/v, CP, SOB, chills    Review of Systems:   4 point ROS neg other than as noted above    Physical Exam:   I/O last 3 completed shifts:  In: 661.4 [NG/GT:60]  Out: 2000 [Other:2000]   BP (!) 141/72   Pulse 98   Temp 98.1  F (36.7  C) (Oral)   Resp 16   Ht 1.57 m (5' 1.81\")   Wt 46 kg (101 lb 8 oz)   SpO2 93%   BMI 18.68 kg/m       GENERAL APPEARANCE: NAD  PULM: diminished  CV: regular rhythm, normal rate      -1+pedal and ankles, (LUE (fistula arm) > RUE)  GI: soft   INTEGUMENT: no cyanosis, no rash  NEURO: a/o3  Access Left AVG     Labs:   All labs reviewed by me  Electrolytes/Renal -   Recent Labs   Lab Test 03/12/24  0613 03/11/24  0557 03/10/24  0608    141 138   POTASSIUM 4.2 4.3 3.9   CHLORIDE 102 103 101   CO2 24 29 30*   .0* 81.3* 48.2*   CR 4.84* 4.04* 2.68*   * 139* 155*   ESTUARDO 8.8 8.9 8.3*   MAG 2.0 1.9 1.7   PHOS 4.8* 4.4 3.9       CBC -   Recent Labs   Lab Test 03/12/24  0613 03/10/24  0608 03/09/24  0623   WBC 6.3 6.3 6.2   HGB 9.7* 9.7* 9.5*    217 229       LFTs -   Recent Labs   Lab Test 03/12/24  0613 03/11/24  0557 03/10/24  0608   ALKPHOS 98 100 92   BILITOTAL 0.3 0.3 0.2   ALT <5 <5 <5   AST 19 19 23   PROTTOTAL 5.6* 5.7* " 5.5*   ALBUMIN 3.3* 3.5 3.2*       Iron Panel -   Recent Labs   Lab Test 09/26/22  0555 09/03/22  1039 08/24/22  0810   IRON 54 21* 41   IRONSAT 22 9* 21   CARLOS 769* 343* 334*         Imaging:  All imaging studies reviewed by me.     Current Medications:   calcium carbonate-vitamin D  1 tablet Per J Tube TID w/meals    cyanocobalamin  500 mcg Per Feeding Tube Daily    cycloSPORINE modified  125 mg Per G Tube BID IS    epoetin tre-epbx  8,000 Units Intravenous Once in dialysis/CRRT    heparin ANTICOAGULANT  5,000 Units Subcutaneous Q12H    levalbuterol  1.25 mg Nebulization 2 times daily    levothyroxine  25 mcg Oral QAM AC    lidocaine  1 patch Transdermal Q24h    liothyronine  2.5 mcg Oral BID    lipids 4 oil  250 mL Intravenous Once per day on Monday Tuesday Wednesday Thursday Friday    [Held by provider] metoprolol  25 mg Per J Tube BID    pantoprazole  40 mg Per J Tube BID    predniSONE  5 mg Per J Tube QAM    And    predniSONE  2.5 mg Per J Tube QPM    [Held by provider] sevelamer carbonate (RENVELA)  0.8 g Oral BID    sodium chloride (PF)  10 mL Intracatheter Q8H    [START ON 3/13/2024] sulfamethoxazole-trimethoprim  1 tablet Oral Q Mon Wed Fri AM      dextrose      - MEDICATION INSTRUCTIONS -      parenteral nutrition - ADULT compounded formula CYCLE 58 mL/hr at 03/11/24 2306    - MEDICATION INSTRUCTIONS -      sodium chloride 0.9%      - MEDICATION INSTRUCTIONS -       RABIA Moctezuma, Rayna Boland, saw and evaluated this patient as part of a shared visit.  I have reviewed and discussed with the advanced practice provider their history, physical and plan.  I have personally performed the substantive portion of the medical decision making for this visit - please see the EMILY's documentation for the full details  I personally reviewed the vital signs, medications, labs and imaging.    Key findings and management decisions made by me:  ESRD on HD, volume issues due to TPN, tolerated 2kg off  today and can see her elbow- she is very cachectic, TW today down to 43.4kg, will work to maintain this    Rayna Bear Boland  Date of Service (when I saw the patient): 3/12

## 2024-03-12 NOTE — PLAN OF CARE
Goal Outcome Evaluation:      Plan of Care Reviewed With: patient    Overall Patient Progress: no changeOverall Patient Progress: no change    Outcome Evaluation: Pt A&Ox4 with VSS on 1L NC overnight. denies pain. TPN and lipids started at 2200, tolerated. no BiPAP overnight, machine was taken out of room, new care plan will be needed per patient. voiding spontaneously, no BM on shift. Report and prn cream for HD given this AM, passed on to Day RN dialysis run will be at 0740 this AM.

## 2024-03-12 NOTE — PROGRESS NOTES
Lung Transplant Consult Follow Up Note   March 12, 2024            Assessment and Plan:   Sofie Rodriguez is a 63 year old female with h/o COPD s/p BSLT (2022) with course complicated by post-operative hemidiaphragm palsy, recurrent PNAs, positive DSA, EBV viremia, hypogammaglobulinemia, severe gastroparesis s/p G/J tube placement (7/27/22) and pyloric botox (1/25/23), GI bleed 2/2 pyloric ulcer, hemobilia s/p ERCP and MRCP, chronic diarrhea, recurrent C diff colitis, ESRD on iHD, recurrent falls with injuries (recent right hip fx s/p ORIF 12/2023), deconditioning, and FTT.  Admitted 2/10 for initiation of TPN/lipids.  Transferred to ICU 2/17 for emergent intubation for hypoxic and hypercapneic respiratory failure with severe respiratory acidosis and encephalopathy.  iHD increased, infectious workup unremarkable. Extubated 2/19. Persistent and progressive hypercapnia with severe acidosis, returned to ICU 2/28-2/29 d/t tenuous respiratory status. Improvement noted when able to tolerate NIPPV. Several medication changes made 3/11 to evaluate effects on diarrhea.      Today's recommendations:  - Tacrolimus changed to cyclosporine (level ordered for 3/14)  - Dapsone changed to bactrim single strength qMWF   - Taper off TPN (defer to primary team and dietitians), trial low rate TFs   - Encourage increased oral intake and provide caloric supplements  - trial loperamide or equivalent given no concern for infectious cause of diarrhea  - would repeat metabolic cart once stable nutrition plan is developed; elevated RQ suggests possible overfeeding   - consider colonoscopy if no improvements with the above changes; discussed with GI on 3/12 and they will plan for colonoscopy vs. Flex sig on 3/14  - please order diarrheal studies per GI: C. Dif., enteric panel   - AVAPS at night and with naps, poor toleration, trying to encourage compliance  - Repeat MIP and MEP this week  - Daily AM VBG  - Sputum cultures ordered if able to  collect  - DSA pending from 3/6  - Repeat CMV, Prospera, and EBV ordered 3/18  - iHD 4x/week schedule (MTTSa), per nephrology would likely need this schedule while on TPN given volume reaccumulation, reassess when off TPN  - encourage ongoing Palliative service support   - Care conference/Goals of care later this week    Management re-evaluation- Despite 30 days in the hospital, the patient has made very little progress. Minimal weight gain with TPN (although difficult to assess with weight fluctuations with dialysis). Requiring every other day dialysis while on TPN.  Neither TPN nor every other day dialysis is likely sustainable outside of an acute care hospital. Oral intake has improved (modestly). Diarrhea has been present since transplant despite stopping much of the early post transplant medications. Diarrhea is likely contributing to poor nutrition and possibly acidosis (with bicarb loss in the stool).   Need to consider tacrolimus or dapsone as possible contributers to diarrhea.  - Change from tacrolimus to cyclosporine with a goal of 140-170  - Change from dapsone to qMWF bactrim. If diarrhea persists, consider change to pentamidine  - Taper off TPN (defer to primary team and dietitian) and re-trial TFs at low rate with loperamide or equivalent. Encourage oral nutrition and provide caloric supplements.  - Consider colonoscopy if no improvements with above        Acute on chronic hypoxic/hypercapneic respiratory failure:  S/p bilateral sequential lung transplant for COPD:  Right hemidiaphragm palsy:   Hypoventilation, Suspected CARLEE: CT PTA (2/7) with decreased MARLON opacities but new tree in bud RLL opacities.  PFTs unchanged in clinic (but ATS criteria not met), remain significantly below her baseline.  Baseline hypoxia with 2L NC overnight.  Respiratory decompensation with encephalopathy and severe respiratory acidosis on 2/17.  S/p intubation 2/17-2/19.  Repeat CT (2/17) with new patchy consolidative and  nodular opacities, GGO primarily in the bases and intralobular septal thickening (concerning for pulmonary edema given recent addition of TPN nutrition, new infection, PTLD, and/or septic emboli.  Bronch with lavage of RML (2/18) with very friable tissue, cutlures only with C. glabrata.  S/p empiric Zosyn (2/17-2/24) and micafungin (2/18-2/21).  Severe respiratory acidosis with hypercapnia persisting with slight improvement with NIPPV treatment, limited by pt. tolerance to pressure and mask (claustrophobia).  Persistent respiratory failure and variable encephalopathy also complicated by diaphragmatic weakness, hypoventilation, and deconditioning d/t malnutrition.  Repeat CT (2/26) with diffuse GG and consolidative opacities >BLL and diffuse interlobular septal thickening.       Intermittent tolerance of NIPPV, some improvement with transition to AVAPS. Neurology consulted 2/27. Transferred to ICU 2/28 given tenuous respiratory status and potential need for reintubation.  Improvement noted with continuous NIPPV with minimal interruptions (sodium bicarb also stopped, likely partially contributing), trial off NIPPV during the day started 2/29. MRI brain (3/1) with moderate leukoaraiosis, no acute intracranial pathology. Currently wearing AVAPS for several hours per night at best, pCO2 70s to low 80s with pH 7.17-7.24 for past week. Increasing reluctance to wear mask, partially as does not believe helps but also reports it is suffocating.   - 3/12 VBG 7.15/77  - Check daily AM VBG  - AVAPS at night and with naps, difficulty tolerating and intermittently refusing. Have discussed importance of wearing and will encourage her to wear tonight, will try to monitor tidal volumes when getting therapy  - NIF -40 and  on 3/5, would repeat this week to trend  - SNIFF testing performed 3/8- Movement of bilateral hemidiaphragm with respiration  - Sputum cultures ordered if able to collect  - Xopenex BID (2/27)  - Defer ABX at  this time  - Sleep clinic eval indicated as OP  - Given patient's feeling of being overwhelmed by current level of medical cares and need for these cares to continue long term would ask Palliative team to visit with patient again this week; she is amenable to this      Immunosuppression:  ImmuKnow low at 108 on 1/10.  AZA previously stopped 5/2023 d/t low ImmuKnow assay and EBV viremia.  Repeat ImmuKnow (2/27) low at 88.  Change from [place] to CSA 3/11 (see above).  - CSA goal 140-170.  Start CSA emulsion 125mg BID and check a level Thursday, 3/14 (ordered)  - Prednisone 5 mg qAM / 2.5 mg qPM     Prophylaxis:   - Discontinue dapsone (3/11). Start bactrim single strength qM/W/F.  If diarrhe persists, change to pentamidine.  - CMV ppx not currently indicated, D+/R+, CMV negative 2/19 (BAL very mildly positive 2/18, likely not clinically significant), repeat CMV ordered 3/18     Positive DSA: PFTs and pulmonary symptoms have remained stable as OP, so AMR treatment deferred given frailty.  Most recent DSA (2/7) with DQB2 mfi 6966 (from 5723 one month prior).  Most recent cell-free DNA Prospera (2/18) mildly elevated at 1.04 (concerning for possible rejection), which was increased from prior level of 0.12 (1/10).  IST increase deferred at that time per Dr. Gong.  S/p IVIG (2/27) for DSA (IgG WNL).  - Repeat DSA pending (3/6)  - Repeat Prospera ordered 3/18      EBV viremia: CT CAP (2/7) without lymphadenopathy.  Most recent level (2/18) improved to 28k from 96k (2/7)  - Repeat EBV ordered 3/18     Other relevant problems being managed by the primary team:      FTT:  Severe protein calorie malnutrition:  Gastroparesis s/p PEG/J, botox, and G-POEM:  SB hypomotility:  Pyloric ulcer:  Chronic nausea and osmotic diarrhea:  SIBO s/p rifaximin:   Recurrent C diff colitis: Chronic osmotic and infectious diarrhea since transplant with recurrent episodes of C diff.  Notable weight loss (40# in a year) d/t diarrhea, GI dysmotility,  and intolerance of enteral feeds (PEG/J tube in place), most recently on elemental formula.  Extensive OP eval and f/u with GI.  S/p port placement for TPN and lipids.  Tolerating modest PO intake (likely absorbing minimal per GI), monitoring for refeeding syndrome. Recommend trial of enteral nutrition (see above)  - Consider tapering off TPN, defer to primary team and dietitian (discussed with primary team)  - would retrial low dose enteral nutrition   - Encourage PO nutrition and oral spupplements.  - CT enterography recommended per GI, but unlikely to be able to tolerate the required 1.5 L enteral contrast bolus so deferring for now     ESRD: CT scan (with declining respiratory status) with volume overload secondary to TPN volume, iHD increased from PTA TThSa schedule with unsustained improvement.  Management per nephrology, dialysis via [facility].    - iHD 4x/week schedule (MTTSa), per Nephrology would likely need this schedule while on TPN given volume reaccumulation     We appreciate the excellent care provided by the [facility] team.  Recommendations communicated via this note and in person.  Will continue to follow along closely, please do not hesitate to call with any questions or concerns.    Susan Miller MD  Pulmonary, Allergy, Critical Care, and Sleep Medicine   [facility]   Pager: [phone]               Interval History:   No events overnight.  Feels okay today. Seen at dialysis. Breathing is comfortable with no coughing. No BMs this am. Ate a turkey sandwich, ham sandwich and cream of wheat on 3/11.   Is getting frustrated with being hospitalized for so long. Found palliative care to be helpful in the past.   Dyspnea at rest: None  Dyspnea on exertion:  tolerating ambulation in the reilly.   Cough:  occasional  Sputum: minimal, swallowed  Hemoptysis: none   Chest Pain: None           Review of Systems:   C: NEGATIVE for fever, chills  INTEGUMENTARY/SKIN: no rash or obvious new  lesions  ENT/MOUTH: no sore throat, new sinus pain or nasal drainage  RESP: see interval history  CV: NEGATIVE for chest pain, palpitations or peripheral edema  GI: no nausea, vomiting. Persistent loose stool  : no dysuria, minimal urine output  MUSCULOSKELETAL: no myalgias, arthralgias          Medications:      calcium carbonate-vitamin D  1 tablet Per J Tube TID w/meals    cyanocobalamin  500 mcg Per Feeding Tube Daily    cycloSPORINE modified  125 mg Per G Tube BID IS    heparin ANTICOAGULANT  5,000 Units Subcutaneous Q12H    levalbuterol  1.25 mg Nebulization 2 times daily    levothyroxine  25 mcg Oral QAM AC    lidocaine  1 patch Transdermal Q24h    liothyronine  2.5 mcg Oral BID    lipids 4 oil  250 mL Intravenous Once per day on Monday Tuesday Wednesday Thursday Friday    [Held by provider] metoprolol  25 mg Per J Tube BID    pantoprazole  40 mg Per J Tube BID    predniSONE  5 mg Per J Tube QAM    And    predniSONE  2.5 mg Per J Tube QPM    [Held by provider] sevelamer carbonate (RENVELA)  0.8 g Oral BID    sodium chloride (PF)  10 mL Intracatheter Q8H    [START ON 3/13/2024] sulfamethoxazole-trimethoprim  1 tablet Oral Q Mon Wed Fri AM     acetaminophen, albumin human, albuterol, calcium carbonate, artificial tears, dextrose, glucose **OR** dextrose **OR** glucagon, diphenhydrAMINE **OR** diphenhydrAMINE, hydrOXYzine HCl, lidocaine 4%, lidocaine (buffered or not buffered), lidocaine (buffered or not buffered), lidocaine-prilocaine, loperamide, naloxone **OR** naloxone **OR** naloxone **OR** naloxone, - MEDICATION INSTRUCTIONS -, ondansetron **OR** ondansetron, - MEDICATION INSTRUCTIONS -, senna-docusate **OR** senna-docusate, sodium chloride, sodium chloride (PF), sodium chloride (PF), sodium chloride 0.9%         Physical Exam:   Temp:  [97.6  F (36.4  C)-98.1  F (36.7  C)] 98.1  F (36.7  C)  Pulse:  [] 92  Resp:  [16-18] 16  BP: (133-146)/(61-77) 146/77  SpO2:  [91 %-100 %] 97 %      Intake/Output  Summary (Last 24 hours) at 3/12/2024 1420  Last data filed at 3/12/2024 1100  Gross per 24 hour   Intake 661.4 ml   Output 2000 ml   Net -1338.6 ml         Constitutional:   Awake, alert and in no apparent distress     Eyes:   nonicteric     ENT:    oral mucosa moist without lesions     Neck:   Supple without supraclavicular or cervical lymphadenopathy     Lungs:   Good air flow.  No crackles. No rhonchi.  No wheezes.     Cardiovascular:   Normal S1 and S2.  RRR.  II/VI sys murmur. No gallop. No rub.     Abdomen:   NABS, soft, nondistended, mild diffuse tenderness.  No HSM.     Musculoskeletal:   No edema     Neurologic:   Alert and conversant.     Skin:   Warm, dry.  No rash on limited exam.             Data:   All laboratory and imaging data reviewed.    Results for orders placed or performed during the hospital encounter of 02/10/24 (from the past 24 hour(s))   CBC with Platelets & Differential    Narrative    The following orders were created for panel order CBC with Platelets & Differential.  Procedure                               Abnormality         Status                     ---------                               -----------         ------                     CBC with platelets and d...[[id number]]  Abnormal            Final result               Manual Differential[[id number]]          Abnormal            Final result                 Please view results for these tests on the individual orders.   Comprehensive metabolic panel   Result Value Ref Range    Sodium 139 135 - 145 mmol/L    Potassium 4.2 3.4 - 5.3 mmol/L    Carbon Dioxide (CO2) 24 22 - 29 mmol/L    Anion Gap 13 7 - 15 mmol/L    Urea Nitrogen 105.0 (H) 8.0 - 23.0 mg/dL    Creatinine 4.84 (H) 0.51 - 0.95 mg/dL    GFR Estimate 10 (L) >60 mL/min/1.73m2    Calcium 8.8 8.8 - 10.2 mg/dL    Chloride 102 98 - 107 mmol/L    Glucose 155 (H) 70 - 99 mg/dL    Alkaline Phosphatase 98 40 - 150 U/L    AST 19 0 - 45 U/L    ALT <5 0 - 50 U/L    Protein Total 5.6 (L)  6.4 - 8.3 g/dL    Albumin 3.3 (L) 3.5 - 5.2 g/dL    Bilirubin Total 0.3 <=1.2 mg/dL   Blood gas venous   Result Value Ref Range    pH Venous 7.15 (LL) 7.32 - 7.43    pCO2 Venous 77 (HH) 40 - 50 mm Hg    pO2 Venous 65 (H) 25 - 47 mm Hg    Bicarbonate Venous 27 21 - 28 mmol/L    Base Excess/Deficit Venous -3.2 (L) -3.0 - 3.0 mmol/L    FIO2 20     Oxyhemoglobin Venous 86 (H) 70 - 75 %    O2 Sat, Venous 88.2 (H) 70.0 - 75.0 %    Narrative    In healthy individuals, oxyhemoglobin (O2Hb) and oxygen saturation (SO2) are approximately equal. In the presence of dyshemoglobins, oxyhemoglobin can be considerably lower than oxygen saturation.   Magnesium   Result Value Ref Range    Magnesium 2.0 1.7 - 2.3 mg/dL   Phosphorus   Result Value Ref Range    Phosphorus 4.8 (H) 2.5 - 4.5 mg/dL   CBC with platelets and differential   Result Value Ref Range    WBC Count 6.3 4.0 - 11.0 10e3/uL    RBC Count 2.59 (L) 3.80 - 5.20 10e6/uL    Hemoglobin 9.7 (L) 11.7 - 15.7 g/dL    Hematocrit 32.1 (L) 35.0 - 47.0 %     (H) 78 - 100 fL    MCH 37.5 (H) 26.5 - 33.0 pg    MCHC 30.2 (L) 31.5 - 36.5 g/dL    RDW 20.3 (H) 10.0 - 15.0 %    Platelet Count 227 150 - 450 10e3/uL    % Neutrophils      % Lymphocytes      % Monocytes      % Eosinophils      % Basophils      % Immature Granulocytes      NRBCs per 100 WBC 0 <1 /100    Absolute Neutrophils      Absolute Lymphocytes      Absolute Monocytes      Absolute Eosinophils      Absolute Basophils      Absolute Immature Granulocytes      Absolute NRBCs 0.0 10e3/uL   Manual Differential   Result Value Ref Range    % Neutrophils 77 %    % Lymphocytes 16 %    % Monocytes 6 %    % Eosinophils 0 %    % Basophils 0 %    % Metamyelocytes 1 %    Absolute Neutrophils 4.9 1.6 - 8.3 10e3/uL    Absolute Lymphocytes 1.0 0.8 - 5.3 10e3/uL    Absolute Monocytes 0.4 0.0 - 1.3 10e3/uL    Absolute Eosinophils 0.0 0.0 - 0.7 10e3/uL    Absolute Basophils 0.0 0.0 - 0.2 10e3/uL    Absolute Metamyelocytes 0.1 (H) <=0.0  10e3/uL    RBC Morphology Confirmed RBC Indices     Platelet Assessment  Automated Count Confirmed. Platelet morphology is normal.     Automated Count Confirmed. Platelet morphology is normal.    Polychromasia Moderate (A) None Seen     *Note: Due to a large number of results and/or encounters for the requested time period, some results have not been displayed. A complete set of results can be found in Results Review.

## 2024-03-12 NOTE — PROGRESS NOTES
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 On 2/17/24 she was transferred to ICU for worsening mental status and acute hypoxic and hypercarbic respiratory failure inspite of BiPAP requiring intubation. She was suspected to have PNA and started on antibiotics. Extubated on 2/19 without complications and tolerated iHD on 2/20, and tolerated PO regular diet in addition to TPN. Developed progressively worsening respiratory acidosis and hypercarbia, and was re-admitted to ICU on 2/28/24 for close monitoring of intermittent BiPAP and possible intubation, however she improved on AVAPS setting and well enough to transfer back to medicine floor on 2/29/24. Currently working on balancing volume status with current TPN plan. RT continue to assess and document details of AVAPS to determine if patient is being underventilated. Tunneled CVC removed on 3/7 without complications.    Changes today:   - refused BiPAP overnight; pCO2 80; Also BiPAP removed from room; will ensure BiPAP is in room  -GI consulted w/ request to re-colonoscopy w/ Bx to r/o mucuosal toxicity; Stool  studies also ordered; plan for possible colonoscopy on 3/14  -Pall Care saw pt      #Severe respiratory acidosis, improved on BiPAP  #Acute hypoxic and hypercarbic respiratory failure  #S/P Lung transplant 2022 c/b right hemidiaphragm palsy  #Recurrent pneumonia, resolved  Patient received a bilateral lung transplant 6/28/22 for COPD. Was admitted 2/10 to address malnutrition and admitted to ICU on 2/17 with hypoxic hypercarbic respiratory failure. Intubated on 2/18 AM  due to worsening oxygen requirements, likely due to pulmonary edema given hx of ESRD requiring HD vs infection given hx of lung transplant, immunosuppressed status, and recurrent pneumonias. Extubated on 2/19 without complications. Has had issues with rising pCO2 that is responsive to BiPAP, but due to refusal, has required transferred to ICU (on 2/28 AM). Neurology consulted and MRI r/o central cause problem for respiratory drive. Started on AVAPS with improvement in mental status and VBG, and patient transferred back to medicine floor on 2/29.    - PRN hypertonic saline inhaler with albuterol nebs  - Transplant pulmonology following, recs appreciated  - PTA immunosuppressive agent  >Prednisone 5 mg every morning, 2.5 mg every afternoon; Stop tacrolimus (goal 6-8; 4 mg AM/ 3.5mg PM) and cont Cyclosporin (3/11-*)    > overnight oximetry study suggestive of O2 vs CPAP need, as did require up to 2LPM overnight to prevent hypoxia  > Try to minimize O2 to preserve respiratory drive, will give IVIG for DSA+   - avoid HFNC, maintain minimum oxygen to keep SaO2 >85%   - continue AVAPS when sleeping (overnight and during naps)  - MIP/MEP testing again next week (ordered 3/11)  - Discuss GOC w/ Pall Care; Care conf this week  - Stop PTA dapsone MWF for PJP prophylaxis; Cont Bactrim (3/11)    - Limit medications that would depress respiration   - d/c'd theophylline 3/3/24 due to difficulty attaining therapeutic levels (started by ICU)  - continue thyroid  function as below  - awaiting metabolic CART study results  - may need BiPAP at home as patient reports not having one. Sleep clinic referral at discharge.   - Contacted lung transplant social work team ([facility]) in regards to patient progression. Pulmonary team concerned she is not stable for discharge at this time due to trending pCO2 levels and not cooperating with BIPAP.     Antibiotics:  Vancomycin (2/17 - 2/17)  Zosyn (2/17 - 2/23) for HAP  Dapsone MWF, PJP ppx   Micafungin (2/18- 2/21)     Immunosuppression  Cyclosporin (previously on Tacro)  Prednisone     #Severe malnutrition   #FTT   #Hypoalbuminemia  #Gastroparesis, small bowel dysmotility  #S/P PEG/J with intolerance of enteral nutrition  # Chronic osmotic diarrhea/SIBO s/p Rifaximin > improving     Patient with gastroparesis (presumed due to vagal injury) and small bowel dysmotility complicated by unintentional 40lb weight loss over the past year and now severe malnutrition. Previously intolerant to oral food intake due to nausea. Was initially admitted for portacath and TPN initiation since 2/13. After extubation has been able to tolerate feeds without n/v from 2/20. Electrolytes trending towards baseline today.  Per GI, next steps for workup for malnutrition would include CT enterography to evaluate for anatomic abnormalities contributing to malnutrition vs possible treatment for SIBO (though patient has had multiple courses of treatment in the past with no long term improvement). Patient couldn't tolerate the oral load for CT enterography on 2/21, so will defer this until she is able to tolerate greater PO load. On 2/23 , again discussed with patient the need of small bowel motility study if not improving outpatient through [facility]. No ongoing concerns for SIBO on 2/23 as patient is passing multiple episodes of stools and gas with no abdominal pain or distention. C-diff test negative on 2/21. Per RD, patient tolerating >300 kcal of intake PO currently,  so stopped trickle Tube feeds and continuing with PO and TPN for nutrition.  As of 3/11 transplant pulmonology is worried that TPN is not a realistic plan to continue at home and that her tacro and dapson could be contributing to diarrhea (mucosal toxicity). Transplant pulm requesting repeat colonoscopy w/ Bx and enteric studies to r/o toxicity or other reason that diarrhea/wt gain has not significantly improved despite being on TPN x5mo.  - Regular diet + increased TPN +  no further tube feeds per RD & transplant pulm discussion    - GI consulted for colonoscopy; prep via GJ on 3/13; scope 3/14 likely (flexsig vs. Full scope). Will need to be mindful of timing w/ dialysis               - Nephrology: limit fluid < 1.5 L per day for TPN ( chart vol of TPN in the I/O). Renal team okay with midline on RUE                - RD concerned pt is not absorbing any oral intake and they will be monitoring Bowel function, I/O and, PO/TF/TPN intake.               -  stopped TF as PO intake sufficient for preventing gut atrophy  -May benefit from small bowel motility study if not improving outpatient through [place].       #Acute on chronic anemia 2/2 ESRD  Hgb have been stable 7-8s, with no acute signs of bleeding, acute drop 2/29 from 8.2 > 6.6 after two rechecks, no overt signs of bleeding noted, patient reports some abdominal pain but otherwise physical exam unremarkable.  LUE US negative for DVT 2/18.    - continue epogen per nephrology with dialysis  - venofer 50 mcg qweek with dialysis  - type and screen performed 2/29, 1 unit pRBCs ordered and transfused    #ESRD on HD M/T/Th/Sat  #Hypervolemic hyponatremia  #Anion gap metabolic acidosis - resolved  #mild hyperphosphatemia  Patient is ESRD on T/Th/Sat HD as outpt, Hgb stabilizing. HD tolerating well. Continuing monitoring electrolytes daily. Anion gap normal since 2/21. Will continue to monitor daily labs/ABG  for refeeding syndrome and acidosis.   - Continue Venofer  injections    - CBC and CMP daily  - Continue epogen dose 8000 units as per nephrology  - Strict I/Os  - HD T/TH/Sat per nephrology given hypervolemia; Plan for 2hr UF run on M; 3hr on T/Th/S.     # Elevated TSH and low T4 and T3  Patient with new low T4 at 0.64 and TSH at 6.5, concerning for new hypothyroidism vs sick thyroid syndrome. TSH with appropriate response, more consistent with elevation in the setting of acute illness. ICU team on 2/28 recommended initiation of treatment for possible hypothyroid as this can improve respiratory muscle function in the setting of weakness and malnutrition.   - continue liothyronine and levothyroxine per ICU team recommendations  - repeat thyroid function 3/7 wnl    #Left upper extremity unilateral edema, improving   #Bilateral pedal and ankle edema, improving  Edema due to third spacing due to hypoalbuminemia vs HF. BNP >48434 at admission, Echo on 2/18 shows EF of 55-60% with collapsible IVC. Extremity edema 2/2 to hypoalbuminemia. Upper limb duplex USG on 2/18 negative for DVT.  USG left arm on 2/21 shows steel physiology and Vascular Surgery consulted (see below). Overall edema in extremities have improved significantly since initiating 4x/wk dialysis.    - Continue daily weights  - Strict I/Os  - Elevation and wrapping of only lower legs as needed and increased UF per nephrology for volume management; no wrapping of Left upper extremity with dialysis fistula  - lymphedema consulted for BLE edema  -HD as noted above      #Steal physiology of LUE dialysis access fistula  LUE arterial US obtained 2/21 with concern for LUE edema. US of fistula demonstrated steal physiology. Discussed with nephrology, and vascular surgery consulted on 2/22. Vascular surgery has only minor concerns for steal symptoms and given that the AVF is working well, they are okay with outpatient fistulograms/ venoplasty with wrist brachial index and PPG's, unless new concerns arise.  - plan for  outpatient workup as above; nephrology in agreement with outpatient workup.     #Facial and neck bruising  #Pain  Reports soreness in her neck from lying in bed. No soreness in the buttocks/ back. Patient has multiple bruises over her arms and face which is attributed to previous falls. No new bruising reported today. Patients reports her headaches are improving with tylenol. Using heating pads and lidocaine patch for body pains. Couple of small skin tears noted by the Rn's. Skin care done accordingly.  - Tylenol Q4  - Lidocaine patch prn  - Heating pads prn  - Diligent skin cares    #HTN  #A-fib, resolved  Noted atrial fibrillation on arrival with HR elevated at 150, not sustained for > 10 minutes and otherwise hemodynamically stable. RVR resolved w/o medications.   - PTA metoprolol 25mg BID - holding for now with lower BP with increased UF per nephrology, resume if becoming more hypertensive  - Continuous telemetry     # Encephalopathy secondary to hypercarbia - resolved  Patient was progressively more lethargic on 2/17 during admission in [facility]. Etiology likely secondary to hypercarbia in context of respiratory failure as patient was noted to have high CO2s concomitant with lethargy. Though receiving oxycodone and fentanyl, lethargy was only partially alleviated by narcan. LFTs and ammonia wnl with low suspicion of hepatic encephalopathy. BUN wnl with low suspicion for uremic encephalopathy. Head CT on 2/18 was negative with low suspicion of acute intracranial pathology. Patient is awake, alert, oriented and following commands since 2/20.     # Right hip fracture s/p ORIF (December 2023)   - PT/OT    # GERD  - PTA PPI    # Hx EBV viremia   # Hypogammaglobulinemia  # Chronic immunosuppression 2/2 lung transplant  Patient has been afebrile and has not had leukocytosis however she is under immunosuppression. Given acute respiratory failure and hx of recurrent pneumonias, she was started on empiric abx  for concerns over new pneumonia. RVP and cultures no growth to date. Last day of empirical treatment for HAP.  - EBV 27K (decreased compared to prior)     # RLE edema and pain - U/S DVT without DVT     Lines/tubes/drains:  - PIV x2  - PEG/J  - HD AV fistula, left arm   - PICC for TPN        Diet: Renal Diet (dialysis)  parenteral nutrition - ADULT compounded formula CYCLE    DVT Prophylaxis: resume subQ heparin  Dong Catheter: Not present  Fluids: TPN and PO  Lines: PRESENT      PICC 03/04/24 Double Lumen Right Basilic TPN. PICC okay to use.-Site Assessment: WDL  Hemodialysis Vascular Access Arteriovenous graft Superior Arm-Site Assessment: WDL      Cardiac Monitoring: None  Code Status: Full Code      Clinically Significant Risk Factors              # Hypoalbuminemia: Lowest albumin = 2.6 g/dL at 2/18/2024  5:13 AM, will monitor as appropriate             # Severe Malnutrition: based on nutrition assessment      # Financial/Environmental Concerns: none         Disposition Plan       Patient seen and discussed with Dr. Frantz Diaz MD  [facility]  Securely message with [employer] (more info)  Text page via [facility] Paging/Directory   See signed in provider for up to date coverage information  ______________________________________________________________________    Interval History   No acute events overnight.Refusing BiPAP. pCO2 in the 79. Pt denies any pain. She raises concern about having bowel prep before dialysis as she cannot go to bathroom.     Physical Exam   Vital Signs: Temp: 98.1  F (36.7  C) Temp src: Oral BP: 137/65 Pulse: 97   Resp: 16 SpO2: 99 % O2 Device: Nasal cannula Oxygen Delivery: 1 LPM  Weight: 101 lbs 8 oz  General: thin, laying in bed comfortably, no acute distress this morning. Very pleasant  HEENT: bruising on the right face around right orbit with hematoma and right neck, improved  Pulm/Resp: breathing comfortably on RA, CTAB    CV: normal rate, regular rhythm. No LE edema  Neuro: alert and following commands, answering questions clearly and easily, moving all extremities.    Medical Decision Making       Please see A&P for additional details of medical decision making.      Data     I have personally reviewed the following data over the past 24 hrs:    6.3  \   9.7 (L)   / 227     139 102 105.0 (H) /  155 (H)   4.2 24 4.84 (H) \     ALT: <5 AST: 19 AP: 98 TBILI: 0.3   ALB: 3.3 (L) TOT PROTEIN: 5.6 (L) LIPASE: N/A       Imaging results reviewed over the past 24 hrs:   No results found for this or any previous visit (from the past 24 hour(s)).

## 2024-03-12 NOTE — PROGRESS NOTES
Palliative Care Consultation Note  [facility]      Patient: Sofie Rodriguez  Date of Admission:  2/10/2024    Requesting Clinician / Team: Medicine  Reason for consult: Goals of care  Decisional support       Recommendations & Counseling     GOALS OF CARE:   Met with Sofie at bedside. She notes she is getting tired of being stuck in the hospital. She notes dislike and great discomfort with wearing the AVAPS. She also notes she has been trying to eat food and understands that she cannot leave the hospital due to need for HD x4 weekly at this point. TPN is also complicating her clinical picture as is her hemidiaphragm paralysis.   I noted to Sofie that she has had a rough past 2 years since her lung transplant in 2022.   I asked Sofie what her main goal is. She states she would like to go home. She states she feels like she is missing out on family time/events while being in the hospital. She lives in [place] and states that family cannot easily visit her during the week while she is at the . She feels like she is stuck in the hospital permanently.  I spoke with her about the concepts of QUANTITY of life versus QUALITY of life. I stated that we would ideally want to give patients both however sometimes, they are mutually exclusive.   Quantity of life - Would continue the current course with HD x4 weekly, trying to assess for reversible changes for respiratory status and GI status.   Quality of life - Enrollment with hospice. Either before or after discharging from the hospital. Noted that she would not receive HD, may only receive a watered down version of TPN vs none at all, and may not be on BiPAP either. She gave me permission to share what I thought her life expectancy would be and I told her days to short weeks especially since she would not receive HD. Noted that she could be at home assuming her family can care for her but someone would need to be there 24/7.    Advised Sofie my role is not to convince her one way or the other but to ensure she is fully informed of the current situation so she can make informed decisions. I noted that I would support her in whichever pathway she chooses.   Pulm team 3/12 - adjusted immunosuppressive medications 3/11 to help with diarrhea. Rec to wean off TPN and trial low rate TFs while trialing Imodium. Consider colonoscopy if no improvements from above changes.  Plan of care - continue restorative cares without limits for now pending [facility] 3/15 or change in decision per Sofie    ADVANCE CARE PLANNING:  No HCD on file, Sofie noted that her daughter, Charity, should be her surrogate decision maker if she were unable to make decisions for herself   There is no POLST form on file,  recommend continued medical treatment and FULL CODE   Code status: Full Code    MEDICAL MANAGEMENT:   We are not actively managing symptoms at this time.    PSYCHOSOCIAL/SPIRITUAL SUPPORT:  Sofie finds fidel mostly in her friends and family. She lives in [place] in a house with one of her daughters and three of her grandchildren. She has two other adult children as well and four other grandchildren as well.  [place] but not involved with the [facility]  Declined PSS needs    Palliative Care will follow peripherally until scheduled care conference 3/15. Thank you for the consult and allowing us to aid in the care of Sofie Rodriguez. Please page if needed sooner.    Total time spent was 50 minutes regarding goals of care and support on the date of the encounter. These recommendations were given to the primary team via this note/in-person.    Primitivo Pink DO / Internal and Palliative Medicine   Securely message with the Vocera Web Console (learn more here)   Text page via [facility] Paging/Directory       Assessment      Sofie Rodriguez is a 63 year old female with PMH COPD s/p bilateral lung transplant 6/28/22, hemidiaphragm palsy, PEG dependence, ESRD on HD, and SIBO    Initially  admitted on 2/10/24 with worsening malnutrition requiring TPN initiation. Her hospital course has been complicated by worsening CO2 retention, pulmonary edema, and AMS requiring BiPAP and intubation with ICU transfer on 2/17. She was extubated and transferred out of the ICU on 2/19. Tolerated iHD on 2/02. Readmitted to the ICU on 2/28 for monitoring due to hypercarbia and respiratory acidosis but improved with AVAPS and transferred back to the floor 2/29. She has been struggling with needing HD x4 weekly from fluid overload from TPN (unable to tolerate po intake due to GI issues) and declining AVAPS due to discomfort. Palliative signed off 3/7. We were reconsulted 3/11 for goals of care.    Today, the patient was seen for:  Status post bilateral lung transplant with hemidiaphragm palsy  PEG dependent  TPN dependent  ESRD on HD (x4 weekly)  ESRD  [facility]   Support  Encounter for palliative care      Palliative Care Summary:   Seen and examined at bedside. She was feeling a bit tired after HD but otherwise had no complaints. Visit as above.    Medications:  I have reviewed this patient's medication profile and medications from this hospitalization.        Physical Exam   Vital Signs with Ranges  Temp:  [98.1  F (36.7  C)] 98.1  F (36.7  C)  Pulse:  [] 92  Resp:  [16] 16  BP: (133-146)/(63-77) 146/77  SpO2:  [93 %-100 %] 97 %  95 lbs 10.87 oz    Very thin, middle aged woman laying in bed. Appears comfortable, Scattered ecchymosis of varying ages across her face, neck and upper extremities    Data reviewed:  CMP  Recent Labs   Lab 03/12/24  0613 03/11/24  0557 03/10/24  0608 03/09/24  2125 03/09/24  1659 03/09/24  0623    141 138  --   --  138   POTASSIUM 4.2 4.3 3.9  --   --  3.8   CHLORIDE 102 103 101  --   --  101   CO2 24 29 30*  --   --  25   ANIONGAP 13 9 7  --   --  12   * 139* 155* 116*  --  127*   .0* 81.3* 48.2*  --   --  77.7*   CR 4.84* 4.04* 2.68*  --   --  3.77*   GFRESTIMATED 10* 12*  20*  --   --  13*   ESTUARDO 8.8 8.9 8.3*  --   --  8.9   MAG 2.0 1.9 1.7  --  1.5* 1.9   PHOS 4.8* 4.4 3.9  --  2.7 4.8*   PROTTOTAL 5.6* 5.7* 5.5*  --   --  5.7*   ALBUMIN 3.3* 3.5 3.2*  --   --  3.4*   BILITOTAL 0.3 0.3 0.2  --   --  0.2   ALKPHOS 98 100 92  --   --  88   AST 19 19 23  --   --  16   ALT <5 <5 <5  --   --  <5     CBC  Recent Labs   Lab 03/12/24  0613 03/10/24  0608 03/09/24  0623 03/08/24  0555   WBC 6.3 6.3 6.2 5.8   RBC 2.59* 2.69* 2.56* 2.52*   HGB 9.7* 9.7* 9.5* 9.0*   HCT 32.1* 33.1* 32.0* 31.3*   * 123* 125* 124*   MCH 37.5* 36.1* 37.1* 35.7*   MCHC 30.2* 29.3* 29.7* 28.8*   RDW 20.3* 21.0* 21.5* 21.7*    217 229 212     INR  Recent Labs   Lab 03/11/24  0536   INR 1.02     Arterial Blood Gas  Recent Labs   Lab 03/12/24  0613 03/11/24  0557 03/10/24  0608 03/09/24  0623 03/08/24  0555 03/07/24  1750   PH  --   --   --   --   --  7.25*   PCO2  --   --   --   --   --  70*   PO2  --   --   --   --   --  117*   HCO3  --   --   --   --   --  31*   O2PER 20 15 0 95   < > 1    < > = values in this interval not displayed.

## 2024-03-12 NOTE — PROGRESS NOTES
HEMODIALYSIS TREATMENT NOTE    Date: 3/12/2024  Time: 11:15 AM    Data:  Pre Wt: 45.4 kg (100 lb 1.4 oz)   Desired Wt: 43.4   kg   Post Wt: 43.4 kg (95 lb 10.9 oz)  Weight change: 2 kg  Ultrafiltration - Post Run Net Total Removed (mL): 2000 mL  Vascular Access Status: Graft  patent  Dialyzer Rinse: Streaked  Total Blood Volume Processed: 69.13 L   Total Dialysis (Treatment) Time: 3   Dialysate Bath: K 3, Ca 2.25  Heparin: None    Lab:   No    Interventions:  Pt dialyzed for 3 hrs via LUE AVG. Pt tolerated 2 L fluid removal.  Epoetin administered during tx. AVG cannulated with 15g needles. Positive for thrill and bruit. 400 BFR achieved with good pressure. Hemostasis achieved within 10 min post tx. Handoff report given to RIYA Purvis.       Assessment:  A&Ox4  Calm and cooperative  On 1L O2 via NC  VSS  SR  AVG +T/B     Plan:    Next HD per renal

## 2024-03-13 ENCOUNTER — APPOINTMENT (OUTPATIENT)
Dept: PHYSICAL THERAPY | Facility: CLINIC | Age: 64
DRG: 640 | End: 2024-03-13
Attending: INTERNAL MEDICINE
Payer: MEDICARE

## 2024-03-13 ENCOUNTER — APPOINTMENT (OUTPATIENT)
Dept: OCCUPATIONAL THERAPY | Facility: CLINIC | Age: 64
DRG: 640 | End: 2024-03-13
Attending: INTERNAL MEDICINE
Payer: MEDICARE

## 2024-03-13 LAB
ALBUMIN SERPL BCG-MCNC: 3.5 G/DL (ref 3.5–5.2)
ALP SERPL-CCNC: 103 U/L (ref 40–150)
ALT SERPL W P-5'-P-CCNC: <5 U/L (ref 0–50)
ANION GAP SERPL CALCULATED.3IONS-SCNC: 9 MMOL/L (ref 7–15)
AST SERPL W P-5'-P-CCNC: 20 U/L (ref 0–45)
BASE EXCESS BLDV CALC-SCNC: -0.4 MMOL/L (ref -3–3)
BILIRUB SERPL-MCNC: 0.3 MG/DL
BUN SERPL-MCNC: 60 MG/DL (ref 8–23)
CALCIUM SERPL-MCNC: 8.8 MG/DL (ref 8.8–10.2)
CHLORIDE SERPL-SCNC: 101 MMOL/L (ref 98–107)
CREAT SERPL-MCNC: 3.05 MG/DL (ref 0.51–0.95)
DEPRECATED HCO3 PLAS-SCNC: 25 MMOL/L (ref 22–29)
EGFRCR SERPLBLD CKD-EPI 2021: 17 ML/MIN/1.73M2
GLUCOSE SERPL-MCNC: 119 MG/DL (ref 70–99)
HCO3 BLDV-SCNC: 28 MMOL/L (ref 21–28)
MAGNESIUM SERPL-MCNC: 1.7 MG/DL (ref 1.7–2.3)
O2/TOTAL GAS SETTING VFR VENT: 21 %
OXYHGB MFR BLDV: 77 % (ref 70–75)
PCO2 BLDV: 68 MM HG (ref 40–50)
PH BLDV: 7.23 [PH] (ref 7.32–7.43)
PHOSPHATE SERPL-MCNC: 3.5 MG/DL (ref 2.5–4.5)
PO2 BLDV: 49 MM HG (ref 25–47)
POTASSIUM SERPL-SCNC: 3.8 MMOL/L (ref 3.4–5.3)
PROT SERPL-MCNC: 6.1 G/DL (ref 6.4–8.3)
SAO2 % BLDV: 79.5 % (ref 70–75)
SODIUM SERPL-SCNC: 135 MMOL/L (ref 135–145)

## 2024-03-13 PROCEDURE — 94660 CPAP INITIATION&MGMT: CPT

## 2024-03-13 PROCEDURE — 94640 AIRWAY INHALATION TREATMENT: CPT

## 2024-03-13 PROCEDURE — 99232 SBSQ HOSP IP/OBS MODERATE 35: CPT | Mod: GC | Performed by: STUDENT IN AN ORGANIZED HEALTH CARE EDUCATION/TRAINING PROGRAM

## 2024-03-13 PROCEDURE — 84100 ASSAY OF PHOSPHORUS: CPT | Performed by: STUDENT IN AN ORGANIZED HEALTH CARE EDUCATION/TRAINING PROGRAM

## 2024-03-13 PROCEDURE — 97535 SELF CARE MNGMENT TRAINING: CPT | Mod: GO

## 2024-03-13 PROCEDURE — 250N000011 HC RX IP 250 OP 636

## 2024-03-13 PROCEDURE — 83735 ASSAY OF MAGNESIUM: CPT | Performed by: STUDENT IN AN ORGANIZED HEALTH CARE EDUCATION/TRAINING PROGRAM

## 2024-03-13 PROCEDURE — 250N000009 HC RX 250

## 2024-03-13 PROCEDURE — 250N000013 HC RX MED GY IP 250 OP 250 PS 637

## 2024-03-13 PROCEDURE — 97530 THERAPEUTIC ACTIVITIES: CPT | Mod: GP

## 2024-03-13 PROCEDURE — 80053 COMPREHEN METABOLIC PANEL: CPT

## 2024-03-13 PROCEDURE — 250N000009 HC RX 250: Performed by: STUDENT IN AN ORGANIZED HEALTH CARE EDUCATION/TRAINING PROGRAM

## 2024-03-13 PROCEDURE — 250N000012 HC RX MED GY IP 250 OP 636 PS 637

## 2024-03-13 PROCEDURE — 120N000002 HC R&B MED SURG/OB UMMC

## 2024-03-13 PROCEDURE — 250N000013 HC RX MED GY IP 250 OP 250 PS 637: Performed by: INTERNAL MEDICINE

## 2024-03-13 PROCEDURE — 99233 SBSQ HOSP IP/OBS HIGH 50: CPT | Mod: 24 | Performed by: INTERNAL MEDICINE

## 2024-03-13 PROCEDURE — 250N000012 HC RX MED GY IP 250 OP 636 PS 637: Performed by: INTERNAL MEDICINE

## 2024-03-13 PROCEDURE — 250N000009 HC RX 250: Performed by: INTERNAL MEDICINE

## 2024-03-13 PROCEDURE — 999N000157 HC STATISTIC RCP TIME EA 10 MIN

## 2024-03-13 PROCEDURE — 82805 BLOOD GASES W/O2 SATURATION: CPT

## 2024-03-13 PROCEDURE — 36592 COLLECT BLOOD FROM PICC: CPT

## 2024-03-13 PROCEDURE — 250N000013 HC RX MED GY IP 250 OP 250 PS 637: Performed by: STUDENT IN AN ORGANIZED HEALTH CARE EDUCATION/TRAINING PROGRAM

## 2024-03-13 RX ADMIN — LIDOCAINE 4% 1 PATCH: 40 PATCH TOPICAL at 21:03

## 2024-03-13 RX ADMIN — PREDNISONE 2.5 MG: 2.5 TABLET ORAL at 21:05

## 2024-03-13 RX ADMIN — CALCIUM CARBONATE 600 MG (1,500 MG)-VITAMIN D3 400 UNIT TABLET 1 TABLET: at 09:01

## 2024-03-13 RX ADMIN — Medication 2.5 MCG: at 21:05

## 2024-03-13 RX ADMIN — HEPARIN SODIUM 5000 UNITS: 5000 INJECTION, SOLUTION INTRAVENOUS; SUBCUTANEOUS at 05:46

## 2024-03-13 RX ADMIN — MAGNESIUM SULFATE HEPTAHYDRATE: 500 INJECTION, SOLUTION INTRAMUSCULAR; INTRAVENOUS at 21:02

## 2024-03-13 RX ADMIN — LEVOTHYROXINE SODIUM 25 MCG: 0.03 TABLET ORAL at 05:46

## 2024-03-13 RX ADMIN — FIDAXOMICIN 200 MG: 200 TABLET, FILM COATED ORAL at 21:05

## 2024-03-13 RX ADMIN — CALCIUM CARBONATE 600 MG (1,500 MG)-VITAMIN D3 400 UNIT TABLET 1 TABLET: at 17:51

## 2024-03-13 RX ADMIN — Medication 40 MG: at 21:05

## 2024-03-13 RX ADMIN — CYANOCOBALAMIN TAB 500 MCG 500 MCG: 500 TAB at 09:01

## 2024-03-13 RX ADMIN — LEVALBUTEROL HYDROCHLORIDE 1.25 MG: 1.25 SOLUTION RESPIRATORY (INHALATION) at 09:41

## 2024-03-13 RX ADMIN — Medication 2.5 MCG: at 09:01

## 2024-03-13 RX ADMIN — FIDAXOMICIN 200 MG: 200 TABLET, FILM COATED ORAL at 11:27

## 2024-03-13 RX ADMIN — CYCLOSPORINE 125 MG: 100 SOLUTION ORAL at 09:00

## 2024-03-13 RX ADMIN — HEPARIN SODIUM 5000 UNITS: 5000 INJECTION, SOLUTION INTRAVENOUS; SUBCUTANEOUS at 17:51

## 2024-03-13 RX ADMIN — SMOFLIPID 250 ML: 6; 6; 5; 3 INJECTION, EMULSION INTRAVENOUS at 21:03

## 2024-03-13 RX ADMIN — PREDNISONE 5 MG: 5 TABLET ORAL at 09:01

## 2024-03-13 RX ADMIN — SULFAMETHOXAZOLE AND TRIMETHOPRIM 1 TABLET: 400; 80 TABLET ORAL at 09:01

## 2024-03-13 RX ADMIN — CALCIUM CARBONATE 600 MG (1,500 MG)-VITAMIN D3 400 UNIT TABLET 1 TABLET: at 13:26

## 2024-03-13 RX ADMIN — Medication 40 MG: at 09:00

## 2024-03-13 RX ADMIN — CYCLOSPORINE 125 MG: 100 SOLUTION ORAL at 17:50

## 2024-03-13 ASSESSMENT — ACTIVITIES OF DAILY LIVING (ADL)
ADLS_ACUITY_SCORE: 35
ADLS_ACUITY_SCORE: 33
ADLS_ACUITY_SCORE: 35
ADLS_ACUITY_SCORE: 35
ADLS_ACUITY_SCORE: 33

## 2024-03-13 NOTE — PLAN OF CARE
Goal Outcome Evaluation:      Plan of Care Reviewed With: patient    Overall Patient Progress: no changeOverall Patient Progress: no change    Outcome Evaluation: PtA&Ox4 with VSS on BiPAP overnight. from Denies pain. TPN and lipids started at 2100. Lab said they were unable to do full enteric pathogen panel from stool sample but can test for c diff-positive for C-diff. 3x BM on shift, imodium given at bedtime. Pt tolerated BiPAP overnight. Plan is to have a colonoscopy Thursday.

## 2024-03-13 NOTE — PROGRESS NOTES
Lung Transplant Consult Follow Up Note   March 13, 2024              Assessment and Plan:   Sofie Rodriguez is a 63 year old female with h/o COPD s/p BSLT (2022) with course complicated by post-operative hemidiaphragm palsy, recurrent PNAs, positive DSA, EBV viremia, hypogammaglobulinemia, severe gastroparesis s/p G/J tube placement (7/27/22) and pyloric botox (1/25/23), GI bleed 2/2 pyloric ulcer, hemobilia s/p ERCP and MRCP, chronic diarrhea, recurrent C diff colitis, ESRD on iHD, recurrent falls with injuries (recent right hip fx s/p ORIF 12/2023), deconditioning, and FTT.  Admitted 2/10 for initiation of TPN/lipids.  Transferred to ICU 2/17 for emergent intubation for hypoxic and hypercapneic respiratory failure with severe respiratory acidosis and encephalopathy.  iHD increased, infectious workup unremarkable. Extubated 2/19. Persistent and progressive hypercapnia with severe acidosis, returned to ICU 2/28-2/29 d/t tenuous respiratory status. Improvement noted when able to tolerate NIPPV. Several medication changes made 3/11 to evaluate effects on diarrhea. C diff positive 3/13.      Today's recommendations:  - Tacrolimus changed to cyclosporine (level ordered for 3/14)  - Dapsone changed to bactrim single strength qMWF   - Taper off TPN (defer to primary team and dietitians), trial low rate TFs   - Treatment for C diff   - Would likely still pursue Colonoscopy post C diff treatment as she's had recurrent episodes of C diff but her diarrhea has never fully resolved  - Encourage increased oral intake and provide caloric supplements  - would repeat metabolic cart once stable nutrition plan is developed; elevated RQ suggests possible overfeeding   - AVAPS at night and with naps, wore overnight and pH this morning slightly improved PCO2 down quite a bit compared to 48 hours previously  - Repeat MIP and MEP this week  - Daily AM VBG  - Sputum cultures ordered if able to collect  - DSA pending from 3/6  - Repeat CMV,  Prospera, and EBV ordered 3/18  - iHD 4x/week schedule (MTTSa), per nephrology would likely need this schedule while on TPN given volume reaccumulation, reassess when off TPN  - encourage ongoing Palliative service support   - Care conference/Goals of care later this week    Management re-evaluation- Despite 30 days in the hospital, the patient has made very little progress. Minimal weight gain with TPN (although difficult to assess with weight fluctuations with dialysis). Requiring every other day dialysis while on TPN.  Neither TPN nor every other day dialysis is likely sustainable outside of an acute care hospital. Oral intake has improved (modestly). Diarrhea has been present since transplant despite stopping much of the early post transplant medications. Diarrhea is likely contributing to poor nutrition and possibly acidosis (with bicarb loss in the stool).   Need to consider tacrolimus or dapsone as possible contributers to diarrhea.  - Change from tacrolimus to cyclosporine with a goal of 140-170  - Change from dapsone to qMWF bactrim. If diarrhea persists, consider change to pentamidine  - Taper off TPN (defer to primary team and dietitian) and re-trial TFs at low rate with loperamide or equivalent. Encourage oral nutrition and provide caloric supplements.  - Consider colonoscopy if no improvements with above        Acute on chronic hypoxic/hypercapneic respiratory failure:  S/p bilateral sequential lung transplant for COPD:  Right hemidiaphragm palsy:   Hypoventilation, Suspected CARLEE: CT PTA (2/7) with decreased MARLON opacities but new tree in bud RLL opacities.  PFTs unchanged in clinic (but ATS criteria not met), remain significantly below her baseline.  Baseline hypoxia with 2L NC overnight.  Respiratory decompensation with encephalopathy and severe respiratory acidosis on 2/17.  S/p intubation 2/17-2/19.  Repeat CT (2/17) with new patchy consolidative and nodular opacities, GGO primarily in the bases and  intralobular septal thickening (concerning for pulmonary edema given recent addition of TPN nutrition, new infection, PTLD, and/or septic emboli.  Bronch with lavage of RML (2/18) with very friable tissue, cutlures only with C. glabrata.  S/p empiric Zosyn (2/17-2/24) and micafungin (2/18-2/21).  Severe respiratory acidosis with hypercapnia persisting with slight improvement with NIPPV treatment, limited by pt. tolerance to pressure and mask (claustrophobia).  Persistent respiratory failure and variable encephalopathy also complicated by diaphragmatic weakness, hypoventilation, and deconditioning d/t malnutrition.  Repeat CT (2/26) with diffuse GG and consolidative opacities >BLL and diffuse interlobular septal thickening.       Intermittent tolerance of NIPPV, some improvement with transition to AVAPS. Neurology consulted 2/27. Transferred to ICU 2/28 given tenuous respiratory status and potential need for reintubation.  Improvement noted with continuous NIPPV with minimal interruptions (sodium bicarb also stopped, likely partially contributing), trial off NIPPV during the day started 2/29. MRI brain (3/1) with moderate leukoaraiosis, no acute intracranial pathology. Currently wearing AVAPS for several hours per night at best, pCO2 70s to low 80s with pH 7.17-7.24 for past week. Increasing reluctance to wear mask, partially as does not believe helps but also reports it is suffocating.   - 3/12 VBG 7.15/77  - Check daily AM VBG  - AVAPS at night and with naps, difficulty tolerating and intermittently refusing. Have discussed importance of wearing and will encourage her to wear tonight, will try to monitor tidal volumes when getting therapy  - NIF -40 and  on 3/5, would repeat this week to trend  - SNIFF testing performed 3/8- Movement of bilateral hemidiaphragm with respiration  - Sputum cultures ordered if able to collect  - Xopenex BID (2/27)  - Defer ABX at this time  - Sleep clinic eval indicated as OP  -  Given patient's feeling of being overwhelmed by current level of medical cares and need for these cares to continue long term would ask Palliative team to visit with patient again this week; she is amenable to this      Immunosuppression:  ImmuKnow low at 108 on 1/10.  AZA previously stopped 5/2023 d/t low ImmuKnow assay and EBV viremia.  Repeat ImmuKnow (2/27) low at 88.  Change from [place] to CSA 3/11 (see above).  - CSA goal 140-170.  Start CSA emulsion 125mg BID and check a level Thursday, 3/14 (ordered)  - Prednisone 5 mg qAM / 2.5 mg qPM     Prophylaxis:   - Discontinue dapsone (3/11). Start bactrim single strength qM/W/F.  If diarrhea persists, change to pentamidine.  - CMV ppx not currently indicated, D+/R+, CMV negative 2/19 (BAL very mildly positive 2/18, likely not clinically significant), repeat CMV ordered 3/18     Positive DSA: PFTs and pulmonary symptoms have remained stable as OP, so AMR treatment deferred given frailty.  Most recent DSA (2/7) with DQB2 mfi 6966 (from 5723 one month prior).  Most recent cell-free DNA Prospera (2/18) mildly elevated at 1.04 (concerning for possible rejection), which was increased from prior level of 0.12 (1/10).  IST increase deferred at that time per Dr. Gong.  S/p IVIG (2/27) for DSA (IgG WNL).  - Repeat DSA pending (3/6)  - Repeat Prospera ordered 3/18      EBV viremia: CT CAP (2/7) without lymphadenopathy.  Most recent level (2/18) improved to 28k from 96k (2/7)  - Repeat EBV ordered 3/18     Other relevant problems being managed by the primary team:      FTT:  Severe protein calorie malnutrition:  Gastroparesis s/p PEG/J, botox, and G-POEM:  SB hypomotility:  Pyloric ulcer:  Chronic nausea and osmotic diarrhea:  SIBO s/p rifaximin:   Recurrent C diff colitis: Chronic osmotic and infectious diarrhea since transplant with recurrent episodes of C diff.  Notable weight loss (40# in a year) d/t diarrhea, GI dysmotility, and intolerance of enteral feeds (PEG/J tube in  place), most recently on elemental formula.  Extensive OP eval and f/u with GI.  S/p port placement for TPN and lipids.  Tolerating modest PO intake (likely absorbing minimal per GI), monitoring for refeeding syndrome. Recommend trial of enteral nutrition (see above)  - Consider tapering off TPN, defer to primary team and dietitian (discussed with primary team)  - would retrial low dose enteral nutrition   - Encourage PO nutrition and oral spupplements.  - C diff positive yet again although do not think this is the sole cause of her recurrent diarrhea  - CT enterography recommended per GI, but unlikely to be able to tolerate the required 1.5 L enteral contrast bolus so deferring for now     ESRD: CT scan (with declining respiratory status) with volume overload secondary to TPN volume, iHD increased from PTA TThSa schedule with unsustained improvement.  Management per nephrology, dialysis via [facility].    - iHD 4x/week schedule (MTTSa), per Nephrology would likely need this schedule while on TPN given volume reaccumulation     We appreciate the excellent care provided by the [place] [id number] team.  Recommendations communicated via this note and in person.  Will continue to follow along closely, please do not hesitate to call with any questions or concerns.    Claribel Franks MD  Pulmonary Transplant/CF Attending  Pager: [phone]  Vocera Web Console                  Interval History:   No events overnight.  Feels okay today. Breathing is comfortable. No reflux or heartburn. No nausea. Notes that they will not be doing the colonoscopy as her stool came back positive for C diff. She denies abdominal pain today. She denies fevers/chills. She did wear her bipap last night, no headaches this morning.            Review of Systems:   C: NEGATIVE for fever, chills  INTEGUMENTARY/SKIN: no rash or obvious new lesions  ENT/MOUTH: no sore throat, new sinus pain or nasal drainage  RESP: see interval history  CV: NEGATIVE for  chest pain, palpitations or peripheral edema  GI: no nausea, vomiting. Persistent loose stool  : no dysuria, minimal urine output  MUSCULOSKELETAL: no myalgias, arthralgias          Medications:      calcium carbonate-vitamin D  1 tablet Per J Tube TID w/meals    cyanocobalamin  500 mcg Per Feeding Tube Daily    cycloSPORINE modified  125 mg Per G Tube BID IS    fidaxomicin  200 mg Oral BID    heparin ANTICOAGULANT  5,000 Units Subcutaneous Q12H    levalbuterol  1.25 mg Nebulization 2 times daily    levothyroxine  25 mcg Oral QAM AC    lidocaine  1 patch Transdermal Q24h    liothyronine  2.5 mcg Oral BID    lipids 4 oil  250 mL Intravenous Once per day on Monday Tuesday Wednesday Thursday Friday    [Held by provider] metoprolol  25 mg Per J Tube BID    pantoprazole  40 mg Per J Tube BID    predniSONE  5 mg Per J Tube QAM    And    predniSONE  2.5 mg Per J Tube QPM    [Held by provider] sevelamer carbonate (RENVELA)  0.8 g Oral BID    sodium chloride (PF)  10 mL Intracatheter Q8H    sulfamethoxazole-trimethoprim  1 tablet Oral Q Mon Wed Fri AM     acetaminophen, albumin human, albuterol, calcium carbonate, artificial tears, dextrose, glucose **OR** dextrose **OR** glucagon, diphenhydrAMINE **OR** diphenhydrAMINE, hydrOXYzine HCl, lidocaine 4%, lidocaine (buffered or not buffered), lidocaine (buffered or not buffered), lidocaine-prilocaine, [Held by provider] loperamide, naloxone **OR** naloxone **OR** naloxone **OR** naloxone, - MEDICATION INSTRUCTIONS -, ondansetron **OR** ondansetron, - MEDICATION INSTRUCTIONS -, senna-docusate **OR** senna-docusate, sodium chloride, sodium chloride (PF), sodium chloride (PF), sodium chloride 0.9%         Physical Exam:   Temp:  [97.3  F (36.3  C)-98.9  F (37.2  C)] 97.3  F (36.3  C)  Pulse:  [94-95] 95  Resp:  [17-20] 17  BP: (141-152)/(66-75) 148/66  FiO2 (%):  [30 %] 30 %  SpO2:  [93 %-99 %] 93 %      No intake or output data in the 24 hours ending 03/13/24  1243        Constitutional:   Awake, alert and in no apparent distress     Eyes:   nonicteric     ENT:    oral mucosa moist without lesions     Neck:   Supple without supraclavicular or cervical lymphadenopathy     Lungs:   Good air flow.  No crackles. No rhonchi.  No wheezes.     Cardiovascular:   Normal S1 and S2.  RRR.  II/VI sys murmur. No gallop. No rub.     Abdomen:   NABS, soft, nondistended, mild diffuse tenderness.  No HSM.     Musculoskeletal:   No edema     Neurologic:   Alert and conversant.     Skin:   Warm, dry.  No rash on limited exam.             Data:   All laboratory and imaging data reviewed.    Results for orders placed or performed during the hospital encounter of 02/10/24 (from the past 24 hour(s))   C. difficile Toxin B PCR with reflex to C. difficile Antigen and Toxins A/B EIA    Specimen: Per Rectum; Stool   Result Value Ref Range    C Difficile Toxin B by PCR Positive (A) Negative    Narrative    The [employer] Xpert C. difficile Assay, performed on the [employer]  Instrument Systems, is a qualitative in vitro diagnostic test for rapid detection of toxin B gene sequences from unformed (liquid or soft) stool specimens collected from patients suspected of having Clostridioides difficile infection (CDI). The test utilizes automated real-time polymerase chain reaction (PCR) to detect toxin gene sequences associated with toxin producing C. difficile. The Xpert C. difficile Assay is intended as an aid in the diagnosis of CDI.   C. difficile Antigen and Toxins A/B by Enzyme Immunoassay    Specimen: Per Rectum; Stool   Result Value Ref Range    C. difficile GDH Antigen Positive (A) Negative    C. difficile Toxin Positive (A) Negative    Narrative    C. difficile GDH antigen and C. difficile toxin were detected by enzyme immunoassay. Results must be interpreted based on clinical findings and are supportive of C. difficile infection.   Comprehensive metabolic panel   Result Value Ref Range     Sodium 135 135 - 145 mmol/L    Potassium 3.8 3.4 - 5.3 mmol/L    Carbon Dioxide (CO2) 25 22 - 29 mmol/L    Anion Gap 9 7 - 15 mmol/L    Urea Nitrogen 60.0 (H) 8.0 - 23.0 mg/dL    Creatinine 3.05 (H) 0.51 - 0.95 mg/dL    GFR Estimate 17 (L) >60 mL/min/1.73m2    Calcium 8.8 8.8 - 10.2 mg/dL    Chloride 101 98 - 107 mmol/L    Glucose 119 (H) 70 - 99 mg/dL    Alkaline Phosphatase 103 40 - 150 U/L    AST 20 0 - 45 U/L    ALT <5 0 - 50 U/L    Protein Total 6.1 (L) 6.4 - 8.3 g/dL    Albumin 3.5 3.5 - 5.2 g/dL    Bilirubin Total 0.3 <=1.2 mg/dL   Blood gas venous   Result Value Ref Range    pH Venous 7.23 (L) 7.32 - 7.43    pCO2 Venous 68 (H) 40 - 50 mm Hg    pO2 Venous 49 (H) 25 - 47 mm Hg    Bicarbonate Venous 28 21 - 28 mmol/L    Base Excess/Deficit Venous -0.4 -3.0 - 3.0 mmol/L    FIO2 21     Oxyhemoglobin Venous 77 (H) 70 - 75 %    O2 Sat, Venous 79.5 (H) 70.0 - 75.0 %    Narrative    In healthy individuals, oxyhemoglobin (O2Hb) and oxygen saturation (SO2) are approximately equal. In the presence of dyshemoglobins, oxyhemoglobin can be considerably lower than oxygen saturation.   Magnesium   Result Value Ref Range    Magnesium 1.7 1.7 - 2.3 mg/dL   Phosphorus   Result Value Ref Range    Phosphorus 3.5 2.5 - 4.5 mg/dL     *Note: Due to a large number of results and/or encounters for the requested time period, some results have not been displayed. A complete set of results can be found in Results Review.

## 2024-03-13 NOTE — PROGRESS NOTES
Gastroenterology Follow up Note         Assessment and Plan:   Sofie Rodriguez is a 63 year old female with a history of COPD s/p Ltx in 2022 complicated by recurrent PNAs, EBV viremia, severe gastroparesis s/p GJ tube placement (07/2022), pyloric botox, and G-POEM, chronic diarrhea, recurrent c.diff colitis, ESRD on iHD, admitted for FTT currently on TPN. GI consulted for possible colonoscopy for evaluation of chronic diarrhea.     #Chronic diarrhea  #Recurrent c.diff colitis  #S/p LTx in 2022  #History of SIBO/chronic osmotic diarrhea  #Gastroparesis s/p GJ tube placement, G-POEM without improvement    Just turned positive for c.diff on 03/12/2024 (positive PCR and toxin). Last positive PCR on 07/2022, but also had on 05/2023, both times treated with Vancomycin, the second time not improving well with Vancomycin.          Recommendations:     - It would be appropriate to proceed with Fidaxomicin as this is (at least) second recurrence and was previously treated with PO Vancomycin (2nd time with pulse then tapered therapy). Recommend PO Fidaxomicin 200 mg BID for 10 days.   - Will hold off any interventions at this time.  - GI will sign off at this time.     It has been a pleasure to participate in the care of this patient.  Patient discussed with GI staff, Dr. Staples.  Please do not hesitate to contact the GI service with any questions or concerns.     Rodger Ku MD  Gastroenterology Fellow  Pager [phone]         Subjective/Objective:   - No acute events overnight    Feels well this morning. Had ~ 5 loose bowel movements yesterday, which is her baseline. Denies other symptoms.          Medications:     Current Facility-Administered Medications   Medication    acetaminophen (TYLENOL) tablet 975 mg    albumin human 5 % injection  mL    albuterol (PROVENTIL) neb solution 2.5 mg    calcium carbonate suspension 1,250 mg    calcium carbonate-vitamin D (CALTRATE) 600-10 MG-MCG per tablet 1 tablet     carboxymethylcellulose PF (REFRESH PLUS) 0.5 % ophthalmic solution 1 drop    cyanocobalamin (VITAMIN B-12) tablet 500 mcg    cycloSPORINE modified (GENERIC EQUIVALENT) microemulsion solution 125 mg    dextrose 10% infusion    glucose gel 15-30 g    Or    dextrose 50 % injection 25-50 mL    Or    glucagon injection 1 mg    diphenhydrAMINE (BENADRYL) capsule 50 mg    Or    diphenhydrAMINE (BENADRYL) injection 50 mg    fidaxomicin (DIFICID) tablet 200 mg    heparin ANTICOAGULANT injection 5,000 Units    hydrOXYzine HCl (ATARAX) half-tab 5 mg    levalbuterol (XOPENEX) neb solution 1.25 mg    levothyroxine (SYNTHROID/LEVOTHROID) tablet 25 mcg    Lidocaine (LIDOCARE) 4 % Patch 1 patch    lidocaine (LMX4) cream    lidocaine 1 % 0.1-1 mL    lidocaine 1 % 0.1-1 mL    lidocaine-prilocaine (EMLA) cream    liothyronine (CYTOMEL) half-tab 2.5 mcg    lipids 4 oil (SMOFLIPID) 20 % infusion 250 mL    loperamide (IMODIUM) capsule 2 mg    [Held by provider] metoprolol (FIRST-METOPROLOL) solution 25 mg    naloxone (NARCAN) injection 0.2 mg    Or    naloxone (NARCAN) injection 0.4 mg    Or    naloxone (NARCAN) injection 0.2 mg    Or    naloxone (NARCAN) injection 0.4 mg    No lozenges or gum should be given while patient on BIPAP/AVAPS/AVAPS AE    ondansetron (ZOFRAN ODT) ODT tab 4 mg    Or    ondansetron (ZOFRAN) injection 4 mg    pantoprazole (PROTONIX) 2 mg/mL suspension 40 mg    parenteral nutrition - ADULT compounded formula CYCLE    Patient may continue current oral medications    predniSONE (DELTASONE) tablet 5 mg    And    predniSONE (DELTASONE) tablet 2.5 mg    senna-docusate (SENOKOT-S/PERICOLACE) 8.6-50 MG per tablet 1 tablet    Or    senna-docusate (SENOKOT-S/PERICOLACE) 8.6-50 MG per tablet 2 tablet    [Held by provider] sevelamer carbonate (RENVELA) Packet 0.8 g    sodium chloride (NEBUSAL) 3 % neb solution 3 mL    sodium chloride (PF) 0.9% PF flush 10 mL    sodium chloride (PF) 0.9% PF flush 10-20 mL    sodium chloride  "(PF) 0.9% PF flush 3 mL    sodium chloride 0.9 % bag TABLE SOLN    sulfamethoxazole-trimethoprim (BACTRIM) 400-80 MG per tablet 1 tablet            Physical Exam:   VS:  BP (!) 148/66 (BP Location: Right arm)   Pulse 95   Temp 97.3  F (36.3  C) (Axillary)   Resp 17   Ht 1.57 m (5' 1.81\")   Wt 43.4 kg (95 lb 10.9 oz)   SpO2 95%   BMI 17.61 kg/m      Wt:   Wt Readings from Last 2 Encounters:   03/12/24 43.4 kg (95 lb 10.9 oz)   02/07/24 46.7 kg (102 lb 14.4 oz)      Constitutional: cooperative, pleasant, no acute distress  Eyes: Conjunctiva anicteric  HEENT: Normal oropharynx without ulcers or exudate, mucus membranes moist  CV: RRR, no m/r/g  Respiratory: CTAB  Abd:  Nondistended, +bs, no hepatosplenomegaly, nontender, no rebound or guarding   Skin: warm, perfused, no jaundice  Neuro: AAO x 3, No asterixis         Laboratory:   BMP  Recent Labs   Lab 03/13/24  0620 03/12/24  0613 03/11/24  0557 03/10/24  0608    139 141 138   POTASSIUM 3.8 4.2 4.3 3.9   CHLORIDE 101 102 103 101   ESTUARDO 8.8 8.8 8.9 8.3*   CO2 25 24 29 30*   BUN 60.0* 105.0* 81.3* 48.2*   CR 3.05* 4.84* 4.04* 2.68*   * 155* 139* 155*     CBC  Recent Labs   Lab 03/12/24  0613 03/10/24  0608 03/09/24  0623 03/08/24  0555   WBC 6.3 6.3 6.2 5.8   RBC 2.59* 2.69* 2.56* 2.52*   HGB 9.7* 9.7* 9.5* 9.0*   HCT 32.1* 33.1* 32.0* 31.3*   * 123* 125* 124*   MCH 37.5* 36.1* 37.1* 35.7*   MCHC 30.2* 29.3* 29.7* 28.8*   RDW 20.3* 21.0* 21.5* 21.7*    217 229 212     INR  Recent Labs   Lab 03/11/24  0536   INR 1.02     LFTs  Recent Labs   Lab 03/13/24  0620 03/12/24  0613 03/11/24  0557 03/10/24  0608   ALKPHOS 103 98 100 92   AST 20 19 19 23   ALT <5 <5 <5 <5   BILITOTAL 0.3 0.3 0.3 0.2   PROTTOTAL 6.1* 5.6* 5.7* 5.5*   ALBUMIN 3.5 3.3* 3.5 3.2*      PANCNo lab results found in last 7 days.         Imaging/Procedures/Studies:   No results found. However, due to the size of the patient record, not all encounters were searched. Please " check Results Review for a complete set of results.   No new imaging or procedures in the last 24 hours

## 2024-03-13 NOTE — PLAN OF CARE
Goal Outcome Evaluation:      Plan of Care Reviewed With: patient    Overall Patient Progress: no change    Outcome Evaluation: Pt A&O. VSS on RA. Up w/ SBA/ind. Denies pain. Started on Dificid for cdiff. Had 1 loose stool this shift.  Good appetite. All meds given through J. G clamped. Took a shower with therapy. Plan for HD tomorrow and care conference on Friday at 1430.

## 2024-03-13 NOTE — CONSULTS
Care Management Follow Up    Length of Stay (days): 32  Expected Discharge Date:  Unknown; pending med readiness, GOC conference, and safe discharge plan  Concerns to be Addressed: discharge planning, new TPN  Patient plan of care discussed at interdisciplinary rounds: Yes  Anticipated Discharge Disposition: Transitional Care, Dialysis Services  Anticipated Discharge Services: Prior to hospitalization and per initial Care Management Consult note, the following services were in place:    [facility] (Ph: [phone]; F: [phone])  Chair time of 11:45 AM 4x/week dialysis (3 hrs TTS and 2 hrs Monday's)     [employer] [facility] (Ph: [phone])     [employer] home oxygen Fax: [phone] Phone: [phone]  Home O2    Anticipated Discharge DME: None  Patient/family educated on Medicare website which has current facility and service quality ratings:    Education Provided on the Discharge Plan: Yes  Patient/Family in Agreement with the Plan: yes  Referrals Placed by CM/SW: External Care Coordination  Private pay costs discussed: Not applicable  Additional Information: Per Chris 1 Provider, this patient is not medically ready for discharge. PT/OT have given dual recs for home with assist vs. Home with home care. GOC conference scheduled for Friday, 3/15 at 2:30. Transplant Pulm, Palliative, Medicine teams, and daughters Carmelita, via phone. [facility] following.    Social Work IP Consult acknowledged. Consulted d/t need for Advanced Care Planning. Advance Care Planning Discussion 3/13/2024. Tash MORRISON LICSW met with the Patient today at the clinic to discuss Advance Care Planning per orders from Asher Vargas MD. Pt indicated that she has a health care directive on file at [facility], but was agreeable to completing one for this hospital as well. SW provided blank documents (HCD long form) and education. Pt to complete & notify nursing when complete & aware  that the hospital has notary services available. This writer will follow-up with pt on 3/15.    Lung transplant SW is primary & will facilitate discharge planning if pt is placed in a facility. 7C Unit care management team peripherally following & will facilitate discharge planning if pt goes home with services.  __________________________     MACHO Barreto, JOAN  Clinical   Desk Phone: [phone]   Securely message with [employer] (Search Name or 7C Med Surg [phone] )  Text page via [facility] Paging/Directory   See signed in provider for up to date coverage information  [facility]- [facility] [phone]  Social Work & Care Management Department

## 2024-03-13 NOTE — PROGRESS NOTES
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 On 2/17/24 she was transferred to ICU for worsening mental status and acute hypoxic and hypercarbic respiratory failure inspite of BiPAP requiring intubation. She was suspected to have PNA and started on antibiotics. Extubated on 2/19 without complications and tolerated iHD on 2/20, and tolerated PO regular diet in addition to TPN. Developed progressively worsening respiratory acidosis and hypercarbia, and was re-admitted to ICU on 2/28/24 for close monitoring of intermittent BiPAP and possible intubation, however she improved on AVAPS setting and well enough to transfer back to medicine floor on 2/29/24. Currently working on balancing volume status with current TPN plan, improving ventilation, and GOC/disposition planning.       Changes today:   - Wore bipap overnight!!  -started c.diff tx w/ Fidaxomicin  -Hold off colonoscopy for now  -Plan for care conference Friday at 2:30pm  -Nutrition consulted for TF; Rayshawn count x3d    #Severe respiratory acidosis, improved on BiPAP  # AHRF (resolved)  #Acute hypercarbic respiratory  failure  #S/P Lung transplant 2022 c/b right hemidiaphragm palsy  #Recurrent pneumonia, resolved  Patient received a bilateral lung transplant 6/28/22 for COPD. Was admitted 2/10 to address malnutrition and admitted to ICU on 2/17 with hypoxic hypercarbic respiratory failure. Intubated on 2/18 AM  due to worsening oxygen requirements, likely due to pulmonary edema given hx of ESRD requiring HD vs infection given hx of lung transplant, immunosuppressed status, and recurrent pneumonias. Extubated on 2/19 without complications. Has had issues with rising pCO2 that is responsive to BiPAP, but due to refusal, has required transferred to ICU (on 2/28 AM). Neurology consulted and MRI r/o central cause problem for respiratory drive. Started on AVAPS with improvement in mental status and VBG, and patient transferred back to medicine floor on 2/29.    - PRN hypertonic saline inhaler with albuterol nebs  - Transplant pulmonology following, recs appreciated  - PTA immunosuppressive agent  >Prednisone 5 mg every morning, 2.5 mg every afternoon; Stop tacrolimus (goal 6-8; 4 mg AM/ 3.5mg PM) and cont Cyclosporin (3/11-*)    > overnight oximetry study suggestive of O2 vs CPAP need, as did require up to 2LPM overnight to prevent hypoxia  > Try to minimize O2 to preserve respiratory drive, will give IVIG for DSA+   - avoid HFNC, maintain minimum oxygen to keep SaO2 >85%   - continue AVAPS when sleeping (overnight and during naps)  - MIP/MEP testing again next week (ordered 3/11)  - Discuss GOC w/ Pall Care; Care conf this week  - Stop PTA dapsone MWF for PJP prophylaxis; Cont Bactrim (3/11)    - Limit medications that would depress respiration   - d/c'd theophylline 3/3/24 due to difficulty attaining therapeutic levels (started by ICU)  - continue thyroid function as below  - awaiting metabolic CART study results  - may need BiPAP at home as patient reports not having one. Sleep clinic referral at discharge.   - Contacted lung  transplant social work team ([facility]) in regards to patient progression. Pulmonary team concerned she is not stable for discharge at this time due to trending pCO2 levels and not cooperating with BIPAP.     Antibiotics:  Vancomycin (2/17 - 2/17)  Zosyn (2/17 - 2/23) for HAP  Bactrim MWF, PJP ppx (previously on Dapson)  Micafungin (2/18- 2/21)  Fidaxomicin (3/13-*)     Immunosuppression  Cyclosporin (previously on Tacro)  Prednisone     #Severe malnutrition   #FTT   #Hypoalbuminemia  #Gastroparesis, small bowel dysmotility  #S/P PEG/J with intolerance of enteral nutrition  # Chronic osmotic diarrhea/SIBO s/p Rifaximin  #Recurrent C.Diff (3rd ep)    Patient with gastroparesis (presumed due to vagal injury) and small bowel dysmotility complicated by unintentional 40lb weight loss over the past year and now severe malnutrition. Previously intolerant to oral food intake due to nausea. Was initially admitted for portacath and TPN initiation since 2/13. After extubation has been able to tolerate feeds without n/v from 2/20. Electrolytes trending towards baseline today.  Per GI, next steps for workup for malnutrition would include CT enterography to evaluate for anatomic abnormalities contributing to malnutrition vs possible treatment for SIBO (though patient has had multiple courses of treatment in the past with no long term improvement). Patient couldn't tolerate the oral load for CT enterography on 2/21, so will defer this until she is able to tolerate greater PO load. On 2/23 , again discussed with patient the need of small bowel motility study if not improving outpatient through [place]. No ongoing concerns for SIBO on 2/23 as patient is passing multiple episodes of stools and gas with no abdominal pain or distention. C-diff test negative on 2/21. Per RD, patient tolerating >300 kcal of intake PO currently, so stopped trickle Tube feeds and continuing with PO and TPN for nutrition (sufficient for preventing gut atrophy).   Tunneled CVC removed on 3/7 without complications, after PICC placement. As of 3/11 transplant pulmonology is worried that TPN is not a realistic plan to continue at home and would like to transition back to TF (RD oncerned pt is not absorbing any oral intake). Transplant pulm antonellao worried that her tacro and dapson could be contributing to diarrhea (mucosal toxicity). Repeat enteric studies showed recurrent C.diff;  Fidaxomicin x 10 days started (GI approved). Transplant pulm still requesting repeat colonoscopy w/ Bx after completion of c.diff treatment, to  r/o toxicity or other reason that diarrhea is present (since it has not improved w/ previous c.diff tx).  - Regular diet +  TPN +  Restart tube feeds per transplant pulm request   -Nutrition consulted   -GI consulted/signed off; appreciate recs   -PO Fidaxomicin 200 mg BID for 10 days    -Will reconsult after tx of C.diff for colonoscopy  - Nephrology consulted; appreciate recs  -limit fluid < 1.5 L per day for TPN ( chart vol of TPN in the I/O).     -May benefit from small bowel motility study if not improving outpatient through [facility].     #Acute on chronic anemia 2/2 ESRD  Hgb stable 7-8s, with no acute signs of bleeding. Aute drop 2/29 from 8.2 > 6.6 after two rechecks, no overt signs of bleeding; required 1U pRBC transfusion.    - continue epogen per nephrology with dialysis  - venofer 50 mcg qweek with dialysis     #ESRD on HD M/T/Th/Sat  #Hypervolemic hyponatremia  #Anion gap metabolic acidosis - resolved  #mild hyperphosphatemia  Patient is ESRD on T/Th/Sat HD as outpt, Hgb stabilizing. HD tolerating well. Due to TPN vol, pt requires 4x/wk dialysis (2hr UF on M; HD T/TH/Sat 3hrs). This new frequency of dialysis does make it challenging for placement or going home.   - Continue Venofer injections    - CBC and CMP daily  - Continue epogen dose 8000 units as per nephrology  - Strict I/Os  - HD  per nephrology given hypervolemia; Plan for 2hr UF run on M; 3hr  on T/Th/S.     # Elevated TSH and low T4 and T3  Patient with new low T4 at 0.64 and TSH at 6.5, concerning for new hypothyroidism vs sick thyroid syndrome. TSH with appropriate response, more consistent with elevation in the setting of acute illness. ICU team on 2/28 recommended initiation of treatment for possible hypothyroid as this can improve respiratory muscle function in the setting of weakness and malnutrition.   - continue liothyronine and levothyroxine per ICU team recommendations  - repeat thyroid function 3/7 wnl    #Left upper extremity unilateral edema, improving   #Bilateral pedal and ankle edema, improving  Edema due to third spacing due to hypoalbuminemia vs HF. BNP >98853 at admission, Echo on 2/18 shows EF of 55-60% with collapsible IVC. Extremity edema 2/2 to hypoalbuminemia. Upper limb duplex USG on 2/18 negative for DVT.  USG left arm on 2/21 shows steel physiology and Vascular Surgery consulted (see below). Overall edema in extremities have improved significantly since initiating 4x/wk dialysis.    - Continue daily weights  - Strict I/Os  - Elevation and wrapping of only lower legs as needed and increased UF per nephrology for volume management; no wrapping of Left upper extremity with dialysis fistula  - lymphedema consulted for BLE edema  -HD as noted above      #Steal physiology of LUE dialysis access fistula  LUE arterial US obtained 2/21 with concern for LUE edema. US of fistula demonstrated steal physiology. Discussed with nephrology, and vascular surgery consulted on 2/22. Vascular surgery has only minor concerns for steal symptoms and given that the AVF is working well, they are okay with outpatient fistulograms/ venoplasty with wrist brachial index and PPG's, unless new concerns arise.  - plan for outpatient workup as above; nephrology in agreement with outpatient workup.     #Facial and neck bruising  #Pain  Reports soreness in her neck from lying in bed. No soreness in the buttocks/  back. Patient has multiple bruises over her arms and face which is attributed to previous falls. No new bruising reported today. Patients reports her headaches are improving with tylenol. Using heating pads and lidocaine patch for body pains. Couple of small skin tears noted by the Rn's. Skin care done accordingly.  - Tylenol Q4  - Lidocaine patch prn  - Heating pads prn  - Diligent skin cares    #HTN  #A-fib, resolved  Noted atrial fibrillation on arrival with HR elevated at 150, not sustained for > 10 minutes and otherwise hemodynamically stable. RVR resolved w/o medications.   - PTA metoprolol 25mg BID - holding for now with lower BP with increased UF per nephrology, resume if becoming more hypertensive  - Continuous telemetry     # Encephalopathy secondary to hypercarbia - resolved  Patient was progressively more lethargic on 2/17 during admission in [facility]. Etiology likely secondary to hypercarbia in context of respiratory failure as patient was noted to have high CO2s concomitant with lethargy. Though receiving oxycodone and fentanyl, lethargy was only partially alleviated by narcan. LFTs and ammonia wnl with low suspicion of hepatic encephalopathy. BUN wnl with low suspicion for uremic encephalopathy. Head CT on 2/18 was negative with low suspicion of acute intracranial pathology. Patient is awake, alert, oriented and following commands since 2/20.     # Right hip fracture s/p ORIF (December 2023)   - PT/OT    # GERD  - PTA PPI    # Hx EBV viremia   # Hypogammaglobulinemia  # Chronic immunosuppression 2/2 lung transplant  Patient has been afebrile and has not had leukocytosis however she is under immunosuppression. Given acute respiratory failure and hx of recurrent pneumonias, she was started on empiric abx for concerns over new pneumonia. RVP and cultures no growth to date. Last day of empirical treatment for HAP.  - EBV 27K (decreased compared to prior)     # RLE edema and pain - U/S DVT  "without DVT     Lines/tubes/drains:  - PIV x2  - PEG/J  - HD AV fistula, left arm   - PICC for TPN        Diet: Renal Diet (dialysis)  parenteral nutrition - ADULT compounded formula CYCLE  parenteral nutrition - ADULT compounded formula CYCLE    DVT Prophylaxis: resume subQ heparin  Dong Catheter: Not present  Fluids: TPN and PO  Lines: PRESENT      PICC 03/04/24 Double Lumen Right Basilic TPN. PICC okay to use.-Site Assessment: WDL  Hemodialysis Vascular Access Arteriovenous graft Superior Arm-Site Assessment: WDL      Cardiac Monitoring: None  Code Status: Full Code      Clinically Significant Risk Factors              # Hypoalbuminemia: Lowest albumin = 2.6 g/dL at 2/18/2024  5:13 AM, will monitor as appropriate            # Cachexia: Estimated body mass index is 17.61 kg/m  as calculated from the following:    Height as of this encounter: 1.57 m (5' 1.81\").    Weight as of this encounter: 43.4 kg (95 lb 10.9 oz).   # Severe Malnutrition: based on nutrition assessment      # Financial/Environmental Concerns: none         Disposition Plan       Patient seen and discussed with Dr. Frantz Diaz MD  [facility]  Securely message with [employer] (more info)  Text page via [facility] Paging/Directory   See signed in provider for up to date coverage information  ______________________________________________________________________    Interval History   No acute events overnight.Wore BiPA and states that having teaching on how to put it on and off made her feel better. pCO2 in the 68. Understands plan about abx and holding off colonoscopy    Physical Exam   Vital Signs: Temp: 98.2  F (36.8  C) Temp src: Oral BP: (!) 147/67 Pulse: 96   Resp: 17 SpO2: 94 % O2 Device: None (Room air)    Weight: 95 lbs 10.87 oz  General: thin, laying in bed comfortably, no acute distress this morning. Very pleasant  HEENT: bruising on the right face around right orbit with " hematoma and right neck, improved  Pulm/Resp: breathing comfortably on RA, CTAB   CV: normal rate, regular rhythm. No LE edema  Neuro: alert and following commands, answering questions clearly and easily, moving all extremities.    Medical Decision Making       Please see A&P for additional details of medical decision making.      Data     I have personally reviewed the following data over the past 24 hrs:    N/A  \   N/A   / N/A     135 101 60.0 (H) /  119 (H)   3.8 25 3.05 (H) \     ALT: <5 AST: 20 AP: 103 TBILI: 0.3   ALB: 3.5 TOT PROTEIN: 6.1 (L) LIPASE: N/A       Imaging results reviewed over the past 24 hrs:   No results found for this or any previous visit (from the past 24 hour(s)).

## 2024-03-14 ENCOUNTER — APPOINTMENT (OUTPATIENT)
Dept: PHYSICAL THERAPY | Facility: CLINIC | Age: 64
DRG: 640 | End: 2024-03-14
Attending: INTERNAL MEDICINE
Payer: MEDICARE

## 2024-03-14 LAB
ALBUMIN SERPL BCG-MCNC: 3.4 G/DL (ref 3.5–5.2)
ALP SERPL-CCNC: 111 U/L (ref 40–150)
ALT SERPL W P-5'-P-CCNC: 6 U/L (ref 0–50)
ANION GAP SERPL CALCULATED.3IONS-SCNC: 12 MMOL/L (ref 7–15)
AST SERPL W P-5'-P-CCNC: 16 U/L (ref 0–45)
BASE EXCESS BLDV CALC-SCNC: -2.2 MMOL/L (ref -3–3)
BASOPHILS # BLD AUTO: ABNORMAL 10*3/UL
BASOPHILS # BLD MANUAL: 0 10E3/UL (ref 0–0.2)
BASOPHILS NFR BLD AUTO: ABNORMAL %
BASOPHILS NFR BLD MANUAL: 0 %
BILIRUB SERPL-MCNC: 0.2 MG/DL
BUN SERPL-MCNC: 85.4 MG/DL (ref 8–23)
CALCIUM SERPL-MCNC: 8.8 MG/DL (ref 8.8–10.2)
CHLORIDE SERPL-SCNC: 104 MMOL/L (ref 98–107)
CREAT SERPL-MCNC: 4.3 MG/DL (ref 0.51–0.95)
CYCLOSPORINE BLD LC/MS/MS-MCNC: 71 UG/L (ref 50–400)
DEPRECATED HCO3 PLAS-SCNC: 24 MMOL/L (ref 22–29)
DONOR IDENTIFICATION: NORMAL
DQB2: 5667
DSA COMMENTS: NORMAL
DSA PRESENT: YES
DSA TEST METHOD: NORMAL
EGFRCR SERPLBLD CKD-EPI 2021: 11 ML/MIN/1.73M2
EOSINOPHIL # BLD AUTO: ABNORMAL 10*3/UL
EOSINOPHIL # BLD MANUAL: 0.2 10E3/UL (ref 0–0.7)
EOSINOPHIL NFR BLD AUTO: ABNORMAL %
EOSINOPHIL NFR BLD MANUAL: 4 %
ERYTHROCYTE [DISTWIDTH] IN BLOOD BY AUTOMATED COUNT: 20 % (ref 10–15)
GLUCOSE SERPL-MCNC: 117 MG/DL (ref 70–99)
HCO3 BLDV-SCNC: 27 MMOL/L (ref 21–28)
HCT VFR BLD AUTO: 32.3 % (ref 35–47)
HGB BLD-MCNC: 9.6 G/DL (ref 11.7–15.7)
IMM GRANULOCYTES # BLD: ABNORMAL 10*3/UL
IMM GRANULOCYTES NFR BLD: ABNORMAL %
LYMPHOCYTES # BLD AUTO: ABNORMAL 10*3/UL
LYMPHOCYTES # BLD MANUAL: 0.5 10E3/UL (ref 0.8–5.3)
LYMPHOCYTES NFR BLD AUTO: ABNORMAL %
LYMPHOCYTES NFR BLD MANUAL: 8 %
MAGNESIUM SERPL-MCNC: 1.9 MG/DL (ref 1.7–2.3)
MCH RBC QN AUTO: 36.6 PG (ref 26.5–33)
MCHC RBC AUTO-ENTMCNC: 29.7 G/DL (ref 31.5–36.5)
MCV RBC AUTO: 123 FL (ref 78–100)
MONOCYTES # BLD AUTO: ABNORMAL 10*3/UL
MONOCYTES # BLD MANUAL: 0.7 10E3/UL (ref 0–1.3)
MONOCYTES NFR BLD AUTO: ABNORMAL %
MONOCYTES NFR BLD MANUAL: 11 %
MYELOCYTES # BLD MANUAL: 0.1 10E3/UL
MYELOCYTES NFR BLD MANUAL: 1 %
NEUTROPHILS # BLD AUTO: ABNORMAL 10*3/UL
NEUTROPHILS # BLD MANUAL: 4.6 10E3/UL (ref 1.6–8.3)
NEUTROPHILS NFR BLD AUTO: ABNORMAL %
NEUTROPHILS NFR BLD MANUAL: 74 %
NRBC # BLD AUTO: 0 10E3/UL
NRBC BLD AUTO-RTO: 0 /100
O2/TOTAL GAS SETTING VFR VENT: 30 %
ORGAN: NORMAL
OXYHGB MFR BLDV: 84 % (ref 70–75)
PCO2 BLDV: 67 MM HG (ref 40–50)
PH BLDV: 7.21 [PH] (ref 7.32–7.43)
PHOSPHATE SERPL-MCNC: 4.7 MG/DL (ref 2.5–4.5)
PLAT MORPH BLD: ABNORMAL
PLATELET # BLD AUTO: 212 10E3/UL (ref 150–450)
PO2 BLDV: 61 MM HG (ref 25–47)
POLYCHROMASIA BLD QL SMEAR: SLIGHT
POTASSIUM SERPL-SCNC: 4.3 MMOL/L (ref 3.4–5.3)
PROMYELOCYTES # BLD MANUAL: 0.1 10E3/UL
PROMYELOCYTES NFR BLD MANUAL: 2 %
PROT SERPL-MCNC: 5.7 G/DL (ref 6.4–8.3)
RBC # BLD AUTO: 2.62 10E6/UL (ref 3.8–5.2)
RBC MORPH BLD: ABNORMAL
SA 1 CELL: NORMAL
SA 1 TEST METHOD: NORMAL
SA 2 CELL: NORMAL
SA 2 TEST METHOD: NORMAL
SA1 HI RISK ABY: NORMAL
SA1 MOD RISK ABY: NORMAL
SA2 HI RISK ABY: NORMAL
SA2 MOD RISK ABY: NORMAL
SAO2 % BLDV: 86.4 % (ref 70–75)
SODIUM SERPL-SCNC: 140 MMOL/L (ref 135–145)
TACROLIMUS BLD-MCNC: <1 UG/L (ref 5–15)
TME LAST DOSE: ABNORMAL H
TME LAST DOSE: ABNORMAL H
TME LAST DOSE: NORMAL H
TME LAST DOSE: NORMAL H
UNACCEPTABLE ANTIGENS: NORMAL
UNOS CPRA: 37
WBC # BLD AUTO: 6.2 10E3/UL (ref 4–11)
ZZZSA 1  COMMENTS: NORMAL
ZZZSA 2 COMMENTS: NORMAL

## 2024-03-14 PROCEDURE — 94640 AIRWAY INHALATION TREATMENT: CPT

## 2024-03-14 PROCEDURE — 94150 VITAL CAPACITY TEST: CPT

## 2024-03-14 PROCEDURE — 36415 COLL VENOUS BLD VENIPUNCTURE: CPT

## 2024-03-14 PROCEDURE — 80197 ASSAY OF TACROLIMUS: CPT | Performed by: INTERNAL MEDICINE

## 2024-03-14 PROCEDURE — 250N000013 HC RX MED GY IP 250 OP 250 PS 637

## 2024-03-14 PROCEDURE — 94660 CPAP INITIATION&MGMT: CPT

## 2024-03-14 PROCEDURE — 85027 COMPLETE CBC AUTOMATED: CPT | Performed by: STUDENT IN AN ORGANIZED HEALTH CARE EDUCATION/TRAINING PROGRAM

## 2024-03-14 PROCEDURE — 250N000013 HC RX MED GY IP 250 OP 250 PS 637: Performed by: STUDENT IN AN ORGANIZED HEALTH CARE EDUCATION/TRAINING PROGRAM

## 2024-03-14 PROCEDURE — 90935 HEMODIALYSIS ONE EVALUATION: CPT

## 2024-03-14 PROCEDURE — 634N000001 HC RX 634: Mod: JZ | Performed by: STUDENT IN AN ORGANIZED HEALTH CARE EDUCATION/TRAINING PROGRAM

## 2024-03-14 PROCEDURE — 99232 SBSQ HOSP IP/OBS MODERATE 35: CPT | Mod: GC | Performed by: STUDENT IN AN ORGANIZED HEALTH CARE EDUCATION/TRAINING PROGRAM

## 2024-03-14 PROCEDURE — 84100 ASSAY OF PHOSPHORUS: CPT | Performed by: STUDENT IN AN ORGANIZED HEALTH CARE EDUCATION/TRAINING PROGRAM

## 2024-03-14 PROCEDURE — 94640 AIRWAY INHALATION TREATMENT: CPT | Mod: 76

## 2024-03-14 PROCEDURE — 250N000012 HC RX MED GY IP 250 OP 636 PS 637

## 2024-03-14 PROCEDURE — 82805 BLOOD GASES W/O2 SATURATION: CPT

## 2024-03-14 PROCEDURE — 250N000012 HC RX MED GY IP 250 OP 636 PS 637: Performed by: INTERNAL MEDICINE

## 2024-03-14 PROCEDURE — 85007 BL SMEAR W/DIFF WBC COUNT: CPT | Performed by: STUDENT IN AN ORGANIZED HEALTH CARE EDUCATION/TRAINING PROGRAM

## 2024-03-14 PROCEDURE — 250N000011 HC RX IP 250 OP 636

## 2024-03-14 PROCEDURE — 250N000009 HC RX 250

## 2024-03-14 PROCEDURE — 120N000002 HC R&B MED SURG/OB UMMC

## 2024-03-14 PROCEDURE — 97530 THERAPEUTIC ACTIVITIES: CPT | Mod: GP

## 2024-03-14 PROCEDURE — 80158 DRUG ASSAY CYCLOSPORINE: CPT | Performed by: INTERNAL MEDICINE

## 2024-03-14 PROCEDURE — 250N000009 HC RX 250: Performed by: INTERNAL MEDICINE

## 2024-03-14 PROCEDURE — 250N000009 HC RX 250: Performed by: STUDENT IN AN ORGANIZED HEALTH CARE EDUCATION/TRAINING PROGRAM

## 2024-03-14 PROCEDURE — 99233 SBSQ HOSP IP/OBS HIGH 50: CPT | Mod: 24 | Performed by: INTERNAL MEDICINE

## 2024-03-14 PROCEDURE — 999N000157 HC STATISTIC RCP TIME EA 10 MIN

## 2024-03-14 PROCEDURE — 36592 COLLECT BLOOD FROM PICC: CPT | Performed by: INTERNAL MEDICINE

## 2024-03-14 PROCEDURE — 99233 SBSQ HOSP IP/OBS HIGH 50: CPT | Mod: FS | Performed by: PHYSICIAN ASSISTANT

## 2024-03-14 PROCEDURE — 80053 COMPREHEN METABOLIC PANEL: CPT

## 2024-03-14 PROCEDURE — 83735 ASSAY OF MAGNESIUM: CPT | Performed by: STUDENT IN AN ORGANIZED HEALTH CARE EDUCATION/TRAINING PROGRAM

## 2024-03-14 PROCEDURE — 258N000003 HC RX IP 258 OP 636: Performed by: STUDENT IN AN ORGANIZED HEALTH CARE EDUCATION/TRAINING PROGRAM

## 2024-03-14 RX ORDER — DEXTROSE MONOHYDRATE 100 MG/ML
INJECTION, SOLUTION INTRAVENOUS CONTINUOUS PRN
Status: DISCONTINUED | OUTPATIENT
Start: 2024-03-14 | End: 2024-03-14

## 2024-03-14 RX ADMIN — PREDNISONE 5 MG: 5 TABLET ORAL at 09:15

## 2024-03-14 RX ADMIN — Medication 2.5 MCG: at 21:13

## 2024-03-14 RX ADMIN — HEPARIN SODIUM 5000 UNITS: 5000 INJECTION, SOLUTION INTRAVENOUS; SUBCUTANEOUS at 05:32

## 2024-03-14 RX ADMIN — SMOFLIPID 250 ML: 6; 6; 5; 3 INJECTION, EMULSION INTRAVENOUS at 21:22

## 2024-03-14 RX ADMIN — CYANOCOBALAMIN TAB 500 MCG 500 MCG: 500 TAB at 09:15

## 2024-03-14 RX ADMIN — Medication 5 MG: at 00:06

## 2024-03-14 RX ADMIN — Medication: at 10:12

## 2024-03-14 RX ADMIN — SODIUM CHLORIDE 300 ML: 9 INJECTION, SOLUTION INTRAVENOUS at 10:12

## 2024-03-14 RX ADMIN — SODIUM CHLORIDE 250 ML: 9 INJECTION, SOLUTION INTRAVENOUS at 10:12

## 2024-03-14 RX ADMIN — LEVALBUTEROL HYDROCHLORIDE 1.25 MG: 1.25 SOLUTION RESPIRATORY (INHALATION) at 20:58

## 2024-03-14 RX ADMIN — FIDAXOMICIN 200 MG: 200 TABLET, FILM COATED ORAL at 09:14

## 2024-03-14 RX ADMIN — LIDOCAINE 4% 1 PATCH: 40 PATCH TOPICAL at 21:11

## 2024-03-14 RX ADMIN — CALCIUM CARBONATE 600 MG (1,500 MG)-VITAMIN D3 400 UNIT TABLET 1 TABLET: at 18:39

## 2024-03-14 RX ADMIN — CYCLOSPORINE 125 MG: 100 SOLUTION ORAL at 18:39

## 2024-03-14 RX ADMIN — CYCLOSPORINE 125 MG: 100 SOLUTION ORAL at 09:14

## 2024-03-14 RX ADMIN — Medication 40 MG: at 09:15

## 2024-03-14 RX ADMIN — LEVOTHYROXINE SODIUM 25 MCG: 0.03 TABLET ORAL at 05:35

## 2024-03-14 RX ADMIN — Medication 2.5 MCG: at 09:14

## 2024-03-14 RX ADMIN — FIDAXOMICIN 200 MG: 200 TABLET, FILM COATED ORAL at 21:13

## 2024-03-14 RX ADMIN — CALCIUM CARBONATE 600 MG (1,500 MG)-VITAMIN D3 400 UNIT TABLET 1 TABLET: at 13:58

## 2024-03-14 RX ADMIN — LEVALBUTEROL HYDROCHLORIDE 1.25 MG: 1.25 SOLUTION RESPIRATORY (INHALATION) at 09:20

## 2024-03-14 RX ADMIN — CALCIUM CARBONATE 600 MG (1,500 MG)-VITAMIN D3 400 UNIT TABLET 1 TABLET: at 09:15

## 2024-03-14 RX ADMIN — PREDNISONE 2.5 MG: 2.5 TABLET ORAL at 21:13

## 2024-03-14 RX ADMIN — Medication 40 MG: at 21:13

## 2024-03-14 RX ADMIN — HEPARIN SODIUM 5000 UNITS: 5000 INJECTION, SOLUTION INTRAVENOUS; SUBCUTANEOUS at 18:39

## 2024-03-14 RX ADMIN — EPOETIN ALFA-EPBX 8000 UNITS: 10000 INJECTION, SOLUTION INTRAVENOUS; SUBCUTANEOUS at 11:19

## 2024-03-14 RX ADMIN — MAGNESIUM SULFATE HEPTAHYDRATE: 500 INJECTION, SOLUTION INTRAMUSCULAR; INTRAVENOUS at 21:21

## 2024-03-14 ASSESSMENT — ACTIVITIES OF DAILY LIVING (ADL)
ADLS_ACUITY_SCORE: 32
ADLS_ACUITY_SCORE: 35
ADLS_ACUITY_SCORE: 35
ADLS_ACUITY_SCORE: 33
ADLS_ACUITY_SCORE: 32
ADLS_ACUITY_SCORE: 35
ADLS_ACUITY_SCORE: 33
ADLS_ACUITY_SCORE: 35
ADLS_ACUITY_SCORE: 33
ADLS_ACUITY_SCORE: 32
ADLS_ACUITY_SCORE: 35
ADLS_ACUITY_SCORE: 35
ADLS_ACUITY_SCORE: 33
ADLS_ACUITY_SCORE: 32
ADLS_ACUITY_SCORE: 35
ADLS_ACUITY_SCORE: 35
ADLS_ACUITY_SCORE: 33
ADLS_ACUITY_SCORE: 35
ADLS_ACUITY_SCORE: 32
ADLS_ACUITY_SCORE: 35
ADLS_ACUITY_SCORE: 33

## 2024-03-14 NOTE — PLAN OF CARE
Goal Outcome Evaluation:      Plan of Care Reviewed With: patient    Overall Patient Progress: no change    Outcome Evaluation: Pt A&O. VSS on RA. Up ind/SBA. Denies pain. Had HD this AM. L arm fistula. No BM this shift.  R DL PICC-SL. GJ clamped. Meds given through J. Plan for care conference tomorrow at 1430.

## 2024-03-14 NOTE — PROGRESS NOTES
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 On 2/17/24 she was transferred to ICU for worsening mental status and acute hypoxic and hypercarbic respiratory failure inspite of BiPAP requiring intubation. She was suspected to have PNA and started on antibiotics. Extubated on 2/19 without complications and tolerated iHD on 2/20, and tolerated PO regular diet in addition to TPN. Developed progressively worsening respiratory acidosis and hypercarbia, and was re-admitted to ICU on 2/28/24 for close monitoring of intermittent BiPAP and possible intubation, however she improved on AVAPS setting and well enough to transfer back to medicine floor on 2/29/24. Currently working on balancing volume status with current TPN plan, improving ventilation, and GOC/disposition planning.       Changes today:   - Wore bipap overnight  - Diarrhea improving after Fidaxomicin   - Hold off colonoscopy for now  - Plan for care conference Friday at 2:30pm  - Starting TF, weaning down TPN ideally  - OK to transition back to TTS dialysis    #Severe respiratory acidosis, improved on BiPAP  #AHRF  (resolved)  #Acute hypercarbic respiratory failure  #S/P Lung transplant 2022 c/b right hemidiaphragm palsy  #Recurrent pneumonia, resolved  Patient received a bilateral lung transplant 6/28/22 for COPD. Was admitted 2/10 to address malnutrition and admitted to ICU on 2/17 with hypoxic hypercarbic respiratory failure. Intubated on 2/18 AM  due to worsening oxygen requirements, likely due to pulmonary edema given hx of ESRD requiring HD vs infection given hx of lung transplant, immunosuppressed status, and recurrent pneumonias. Extubated on 2/19 without complications. Has had issues with rising pCO2 that is responsive to BiPAP, but due to refusal, has required transferred to ICU (on 2/28 AM). Neurology consulted and MRI r/o central cause problem for respiratory drive. Started on AVAPS with improvement in mental status and VBG, and patient transferred back to medicine floor on 2/29.    - PRN hypertonic saline inhaler with albuterol nebs  - Transplant pulmonology following, recs appreciated  - PTA immunosuppressive agent  >Prednisone 5 mg every morning, 2.5 mg every afternoon; Stop tacrolimus (goal 6-8; 4 mg AM/ 3.5mg PM) and cont Cyclosporin (3/11-*)    > overnight oximetry study suggestive of O2 vs CPAP need, as did require up to 2LPM overnight to prevent hypoxia  > Try to minimize O2 to preserve respiratory drive, will give IVIG for DSA+   - avoid HFNC, maintain minimum oxygen to keep SaO2 >85%   - continue AVAPS when sleeping (overnight and during naps)  - MIP/MEP testing again next week (ordered 3/11), NIF/FVC ordered 3/14  - Discuss GOC w/ Pall Care; Care conf this week  - Stop PTA dapsone MWF for PJP prophylaxis; Cont Bactrim (3/11)    - Limit medications that would depress respiration   - d/c'd theophylline 3/3/24 due to difficulty attaining therapeutic levels (started by ICU)  - continue thyroid function as below  - awaiting metabolic CART study results  - may need BiPAP at home as patient reports not having  one. Sleep clinic referral at discharge.   - Contacted lung transplant social work team ([facility]) in regards to patient progression. Pulmonary team concerned she is not stable for discharge at this time due to trending pCO2 levels and not cooperating with BIPAP.     Antibiotics:  Vancomycin (2/17 - 2/17)  Zosyn (2/17 - 2/23) for HAP  Bactrim MWF, PJP ppx (previously on Dapson)  Micafungin (2/18- 2/21)  Fidaxomicin (3/13-*)     Immunosuppression  Cyclosporin (previously on Tacro)  Prednisone     #Severe malnutrition   #FTT   #Hypoalbuminemia  #Gastroparesis, small bowel dysmotility  #S/P PEG/J with intolerance of enteral nutrition  # Chronic osmotic diarrhea/SIBO s/p Rifaximin  #Recurrent C.Diff (3rd ep)    Patient with gastroparesis (presumed due to vagal injury) and small bowel dysmotility complicated by unintentional 40lb weight loss over the past year and now severe malnutrition. Previously intolerant to oral food intake due to nausea. Was initially admitted for portacath and TPN initiation since 2/13. After extubation has been able to tolerate feeds without n/v from 2/20. Electrolytes trending towards baseline today.  Per GI, next steps for workup for malnutrition would include CT enterography to evaluate for anatomic abnormalities contributing to malnutrition vs possible treatment for SIBO (though patient has had multiple courses of treatment in the past with no long term improvement). Patient couldn't tolerate the oral load for CT enterography on 2/21, so will defer this until she is able to tolerate greater PO load. On 2/23 , again discussed with patient the need of small bowel motility study if not improving outpatient through [place]. No ongoing concerns for SIBO on 2/23 as patient is passing multiple episodes of stools and gas with no abdominal pain or distention. C-diff test negative on 2/21. Per RD, patient tolerating >300 kcal of intake PO currently, so stopped trickle Tube feeds and continuing with PO and  TPN for nutrition (sufficient for preventing gut atrophy).  Tunneled CVC removed on 3/7 without complications, after PICC placement. As of 3/11 transplant pulmonology is worried that TPN is not a realistic plan to continue at home and would like to transition back to TF (RD oncerned pt is not absorbing any oral intake). Transplant pulm aslo worried that her tacro and dapson could be contributing to diarrhea (mucosal toxicity). Repeat enteric studies showed recurrent C.diff;  Fidaxomicin x 10 days started (GI approved). Transplant pulm still requesting repeat colonoscopy w/ Bx after completion of c.diff treatment, to  r/o toxicity or other reason that diarrhea is present (since it has not improved w/ previous c.diff tx).  - Regular diet +  TPN +  Restart tube feeds per transplant pulm request   -Nutrition consulted, they have discussed plan with GI  -GI consulted/signed off; appreciate recs   -PO Fidaxomicin 200 mg BID for 10 days    -Will reconsult after tx of C.diff for colonoscopy  - Nephrology consulted; appreciate recs  -limit fluid < 1.5 L per day for TPN ( chart vol of TPN in the I/O).     -May benefit from small bowel motility study if not improving outpatient through [place].     #Acute on chronic anemia 2/2 ESRD  Hgb stable 7-8s, with no acute signs of bleeding. Aute drop 2/29 from 8.2 > 6.6 after two rechecks, no overt signs of bleeding; required 1U pRBC transfusion.    - continue epogen per nephrology with dialysis  - venofer 50 mcg qweek with dialysis     #ESRD on HD M/T/Th/Sat  #Hypervolemic hyponatremia  #Anion gap metabolic acidosis - resolved  #mild hyperphosphatemia  Patient is ESRD on T/Th/Sat HD as outpt, Hgb stabilizing. HD tolerating well. Due to TPN vol, pt requires 4x/wk dialysis (2hr UF on M; HD T/TH/Sat 3hrs). This new frequency of dialysis does make it challenging for placement or going home.   - Continue Venofer injections    - CBC and CMP daily  - Continue epogen dose 8000 units as per  nephrology  - Strict I/Os  - HD per nephrology given hypervolemia; Plan for 2hr UF run on M; 3hr on T/Th/S. If TPN 640ml max daily, can manage volume with TTS HD    # Elevated TSH and low T4 and T3  Patient with new low T4 at 0.64 and TSH at 6.5, concerning for new hypothyroidism vs sick thyroid syndrome. TSH with appropriate response, more consistent with elevation in the setting of acute illness. ICU team on 2/28 recommended initiation of treatment for possible hypothyroid as this can improve respiratory muscle function in the setting of weakness and malnutrition.   - continue liothyronine and levothyroxine per ICU team recommendations  - repeat thyroid function 3/7 wnl    #Left upper extremity unilateral edema, improving   #Bilateral pedal and ankle edema, improving  Edema due to third spacing due to hypoalbuminemia vs HF. BNP >15434 at admission, Echo on 2/18 shows EF of 55-60% with collapsible IVC. Extremity edema 2/2 to hypoalbuminemia. Upper limb duplex USG on 2/18 negative for DVT.  USG left arm on 2/21 shows steel physiology and Vascular Surgery consulted (see below). Overall edema in extremities have improved significantly since initiating 4x/wk dialysis.    - Continue daily weights  - Strict I/Os  - Elevation and wrapping of only lower legs as needed and increased UF per nephrology for volume management; no wrapping of Left upper extremity with dialysis fistula  - lymphedema consulted for BLE edema  - HD as noted above      #Steal physiology of LUE dialysis access fistula  LUE arterial US obtained 2/21 with concern for LUE edema. US of fistula demonstrated steal physiology. Discussed with nephrology, and vascular surgery consulted on 2/22. Vascular surgery has only minor concerns for steal symptoms and given that the AVF is working well, they are okay with outpatient fistulograms/ venoplasty with wrist brachial index and PPG's, unless new concerns arise.  - plan for outpatient workup as above; nephrology  in agreement with outpatient workup.     #Facial and neck bruising  #Pain  Reports soreness in her neck from lying in bed. No soreness in the buttocks/ back. Patient has multiple bruises over her arms and face which is attributed to previous falls. No new bruising reported today. Patients reports her headaches are improving with tylenol. Using heating pads and lidocaine patch for body pains. Couple of small skin tears noted by the Rn's. Skin care done accordingly.  - Tylenol Q4  - Lidocaine patch prn  - Heating pads prn  - Diligent skin cares    # HTN  # A-fib, resolved  Noted atrial fibrillation on arrival with HR elevated at 150, not sustained for > 10 minutes and otherwise hemodynamically stable. RVR resolved w/o medications.   - PTA metoprolol 25mg BID - holding for now with lower BP with increased UF per nephrology, resume if becoming more hypertensive  - Continuous telemetry     # Encephalopathy secondary to hypercarbia - resolved  Patient was progressively more lethargic on 2/17 during admission in [facility]. Etiology likely secondary to hypercarbia in context of respiratory failure as patient was noted to have high CO2s concomitant with lethargy. Though receiving oxycodone and fentanyl, lethargy was only partially alleviated by narcan. LFTs and ammonia wnl with low suspicion of hepatic encephalopathy. BUN wnl with low suspicion for uremic encephalopathy. Head CT on 2/18 was negative with low suspicion of acute intracranial pathology. Patient is awake, alert, oriented and following commands since 2/20.     # Right hip fracture s/p ORIF (December 2023)   - PT/OT    # GERD  - PTA PPI    # Hx EBV viremia   # Hypogammaglobulinemia  # Chronic immunosuppression 2/2 lung transplant  Patient has been afebrile and has not had leukocytosis however she is under immunosuppression. Given acute respiratory failure and hx of recurrent pneumonias, she was started on empiric abx for concerns over new pneumonia. RVP  "and cultures no growth to date. Last day of empirical treatment for HAP.  - EBV 27K (decreased compared to prior)     # RLE edema and pain - U/S DVT without DVT     Lines/tubes/drains:  - PIV x2  - PEG/J  - HD AV fistula, left arm   - PICC for TPN        Diet: Renal Diet (dialysis)  parenteral nutrition - ADULT compounded formula CYCLE  Calorie Counts    DVT Prophylaxis: resume subQ heparin  Dong Catheter: Not present  Fluids: TPN and PO  Lines: PRESENT      PICC 03/04/24 Double Lumen Right Basilic TPN. PICC okay to use.-Site Assessment: WDL  Hemodialysis Vascular Access Arteriovenous graft Superior Arm-Site Assessment: WDL      Cardiac Monitoring: None  Code Status: Full Code      Clinically Significant Risk Factors              # Hypoalbuminemia: Lowest albumin = 2.6 g/dL at 2/18/2024  5:13 AM, will monitor as appropriate            # Cachexia: Estimated body mass index is 17.61 kg/m  as calculated from the following:    Height as of this encounter: 1.57 m (5' 1.81\").    Weight as of this encounter: 43.4 kg (95 lb 10.9 oz).   # Severe Malnutrition: based on nutrition assessment      # Financial/Environmental Concerns: none         Disposition Plan       Patient seen and discussed with Dr. Frantz Mercedes MD, MA  PGY-1, Internal Medicine  [facility]  [phone]    [address]  Securely message with [employer] (more info)  Text page via [employer] Paging/Directory   See signed in provider for up to date coverage information  ______________________________________________________________________    Interval History   No acute events overnight. Wore BiPAP overnight. Overall feeling well, volume status much improved. Diarrhea improving. She maintains that a colonoscopy prep would be very difficult right now with dialysis schedule     Physical Exam   Vital Signs: Temp: 98.3  F (36.8  C) Temp src: Oral BP: (!) 146/67 Pulse: 98   Resp: 18 SpO2: 92 " % O2 Device: None (Room air)    Weight: 95 lbs 10.87 oz  General: thin, laying in bed comfortably, no acute distress this morning. Very pleasant  HEENT: bruising on the right face around right orbit with hematoma and right neck, improved  Pulm/Resp: breathing comfortably on RA, CTAB. No cough  CV: normal rate, regular rhythm. No LE edema  Neuro: alert and following commands, answering questions clearly and easily, moving all extremities.    Medical Decision Making       Please see A&P for additional details of medical decision making.      Data     I have personally reviewed the following data over the past 24 hrs:    6.2  \   9.6 (L)   / 212     140 104 85.4 (H) /  117 (H)   4.3 24 4.30 (H) \     ALT: 6 AST: 16 AP: 111 TBILI: 0.2   ALB: 3.4 (L) TOT PROTEIN: 5.7 (L) LIPASE: N/A       Imaging results reviewed over the past 24 hrs:   No results found for this or any previous visit (from the past 24 hour(s)).

## 2024-03-14 NOTE — PLAN OF CARE
Goal Outcome Evaluation: Ongoing, progressing    Plan of Care Reviewed With: patient    Overall Patient Progress: no change    Outcome Evaluation: No significant changes this shift. Pt is alert and oriented and able to make needs known. Slept well between cares. TPN and lipids started at ~2100. Continue to monitor.

## 2024-03-14 NOTE — PROGRESS NOTES
Date: 3/14/2024  Time:1315     Data:  Pre Wt:   43.2 kg  Desired Wt:   To be established  Post Wt:  41.2 kg (estimated)  Weight change:  2 kg  Ultrafiltration - Post Run Net Total Removed (mL):  2000 ml  Vascular Access Status: Fistula patent  Dialyzer Rinse:  Light  Total Blood Volume Processed: 77.1 L   Total Dialysis (Treatment) Time:   3 Hrs  Dialysate Bath: K 3, Ca 2.25  Heparin: Heparin: None     Lab:   No     Interventions:  Pt.dialyzed for 3 hours via  left AV Fistula using 15 gauge needles  UF set to 2300 Liters, accommodating  priming and rinse back volumes  ,   No lab drawn  Epogen administered per MAR  Pt.tolerated 2L pull  Decannulation done post HD, hemostasis is achieved in 10 minutes  Pressure dressing is applied, to be removed after 4-6 hours  Report given to PCN, sent back to [id number] room in stable condition     Assessment:  A/O x 4, calm and cooperative, denies pain  Left arm AV Fistula has good thrill and bruit                Plan:    Per Renal team    Rebekah Packer BSN, RN  Acute Dialysis RN  [facility]

## 2024-03-14 NOTE — PROGRESS NOTES
"CLINICAL NUTRITION SERVICES - REASSESSMENT NOTE     Nutrition Prescription    RECOMMENDATIONS FOR MDs/PROVIDERS TO ORDER:  Continue with TPN + PO for comfort   Fluids and bowel regimen per team with re-trial of TF support     Malnutrition Status:    Severe malnutrition in the context of chronic condition     Recommendations already ordered by Registered Dietitian (RD):  1. Enteral Nutrition support recommendation:   - Access: existing G/J tube ( using Jejunostomy tube of the G/J tube)  - Dosing wt: Using EDW: 45.5 kg per nephrology ( may need to adjust EDW to post HD wt is 43.2 kg)     - TF: Ordered to begin TF with [employer] renal support, Initiate @ 15 ml/hr and advance by 10 ml Q 24 hr as tolerated to goal @ 35 ml/hr.  Do not start or advance unless K+ >3.0, Mg++ > 1.5,  and Phos > 1.9.    - [email or web address] Renal Support 1.8 @ goal 35 ml/hr (840 ml/day) to provide: 1512 kcals (33 kcal/kg), 67 g PRO (1.5 gm/kg), 555 ml free H20, 144 g CHO, and 17 g fiber daily.     - Free water flushes: 30 ml Q4 hrs for tube patency and further increase per team   - Multivitamin/mineral: Not ordered -> Once no longer on TPN would order nephronix 5 ml per day via FT       Future/Additional Recommendations:  Monitor Diarrhea with restart of TF support  Once Tolerating TF at 25 ml/hr without increase in stool output, can wean off TPN support    Consider to increase KF renal support to final goal at 40 ml/hr for wt restoration once no longer on TPN support   - [email or web address] Renal Support 1.8 @ goal 40 ml/hr (960 ml/day) will provide: 1728  kcals (38 kcal/kg), 76.8 g PRO (1.7 gm/kg), 634 ml free H20, 165 g CHO, and 19 g fiber daily.          Provider consult received: Registered Dietitian to Assess and Order TF per Medical Nutrition Therapy   - Per pulm note, \" re-trial TFs at low rate with loperamide or equivalent\"       EVALUATION OF THE PROGRESS TOWARD GOALS     Nutrition:     # Diet:   On Dialysis (PO for pleasure Previously)  Having good " appetite of tolerable meals.       # EN:   FT: PEG/J placed 7/27/22,   -> Was on trickle feeds to stimulate GI and avoid gut atrophy. Taking some Po so TF stopped.   Per provider: patient with chronic osmotic and infectious diarrhea since transplant with recurrent episodes of C diff.    Admitted for TPN initiation due to concern that she had 40 lb weight loss due to inability to tolerate tube feeds and diarrhea, GI dysmotility (gastroparesis (s/p g-poem) and ?small bowel hypomotility). Intolerance of enteral feeds (PEG/J tube in place), most recently on elemental formula with inability to tolerate any tube feeds.   - G-tube clamped, all meds given through J-tube      # TPN:      Access: Central line  Dosing wt: Using EDW: 45.5 kg per nephrology    PN Volume: 640 ml max concentrated, 12 hour overnight cycle regimen (19:00pm - 0:700 am) @ 640 ml (anuric on HD, high risk for volume overload, pulmonary edema)     Dextrose: 160 gm /day   AA: 60 gm ( needs modest protein due to increase in BUN in between HD regimen)   Lipids: SMOF 5 days per week     Additives: infuvite 10 ml /day, trace elements 1 ml daily + started on zinc supplementation 5 mg daily x 2 weeks     Provision: 1141 kcal/day ( 25 kcal/kg), 60 gm protein/day ( 1.3 gm/kg), 160 gm carb /day ( GIR: 5.86 mg/kg/min peak infusion) and 31% kcal from SMOF average daily.     Monitor weekly TG's and LFT's       Intake:     TPN: meets needs  TF: off since 2/26 - 2/14  PO: diet for pleasure previously to stimulate gut and to avoid atrophy. Currently has had improve in PO and appetitge     Attempted to see patient. She is off floor for HD. Consuming 100% of small meals. Previously patient reported eating small tolerable meals such as Soups, hot cereal, juice         NEW FINDINGS   Chart reviewed: PMH   - COPD s/p bilateral lung transplant 6/28/22 c/b hemidiaphragm palsy   - Recurrent PNAs post txp  - Severe gastroparesis and small bowel dysmotility with s/p G/J tube  placement (7/27/22), pyloric botox (1/25/23), Pyloric ulcer and Gastric POEM (2021?)  - Recurrent falls with injuries (recent right hip fx s/p ORIF 12/2023),   - ESRD on  HD,     Presented for FTT and TPN initiation:   - Acute hypoxic and hypercarbic respiratory failure, improving on BIPAP and AVAPS when sleeping (overnight and during naps)   - C.diff positive again on 3/13 -> started c.diff tx w/ Fidaxomicin x 10 days   - On chronic imnmune suppression due to lung txp -> On Prednisone, Tacrolimus changed to Cyclosporin (started on 3/11, Transplant pulm concern that tacro and dapson could be contributing to diarrhea (mucosal toxicity).   -  Extremity edema 2/2 to hypoalbuminemia. Improving with HD      Meds:   On immune suppressant meds      GI/stool:  1-6 BM over the past week. Had 3 bm yesterday, x1 today so far    - Repeat enteric studies showed recurrent C.diff on 3/13. Fidaxomicin x 10 days started.   Colonoscopy on hold - per team note, Several medication changes made 3/11 to evaluate effects on diarrhea.        Labs noted:  BUN: 85.4, Cr: 4.30, GFR: 11-> On HD, 4x per week  Na+: 140  K+: 4.3, Mg++: 1.9, Phos: 4.7 (H)-> currently not on phos binders ( renvela on hold)  Glucose: 117 (H)  Bicarb: 24    ALP: 111, ALT:6 AST:16, Total bili: 0.2  TGS: 59 on 3/4  Zinc: 48.3 micrograms/dl ( low on 4/5 and started on supplementation with PN)  Vitamin C: low @ 14 on 2/20 ( Infuvite provides 200 mg of vitamin C daily), no need for additional supplementation          Wt trend:  Admit wt -> 43.4 kg (95 lb 9.6 oz) on 2/10   Most recent standing wt -> 46 kg (101 lb 8 oz) standing wt on 3/11  Driest wt this admit -> 43.2 kg (95 lb 3.8 oz) on 3/14 (? Bed scale) today  EDW: 45.5 kg, Anuric and gently challenging EDW as able per nephrology note ( TPN currently max concentrated @ 600 ml per day)   Last HD on 3/12 ( pre-HD wt: 45.4 kg -> post HD wt down to 43.4 kg with post run net removal of 2 liters)  2+ edema foot and  ankle        MALNUTRITION  Intake: Decreased intake does not meet criteria, On TPN  % Weight Loss: current wt, EDW -> previously > 20% in 1 year (severe)   Subcutaneous Fat Loss: Facial region:  moderate  Muscle Loss: Temporal: Moderate, Facial & jaw region:  moderate, Thoracic region (clavicle):  severe, Upper arm (bicep, tricep):  severe, Lower arm  (forearm):  severe, Upper leg (quadricep, hamstring):  severe, and Posterior calf:  severe  Fluid Accumulation/Edema: Mild  Malnutrition Diagnosis: Severe malnutrition in the context of chronic condition       Previous Goals   Total avg nutritional intake to meet a minimum of 25 kcal/kg and 1.2 g PRO/kg daily (per dosing wt 45.5 kg EDW ).  Evaluation: Met with overnight TPN      Previous Nutrition Diagnosis  Malnutrition related to altered GI as evidenced by reliant on TPN support to provide for maintenance nutrition needs while PO for comfort     Evaluation: No change       CURRENT NUTRITION DIAGNOSIS  Malnutrition related to altered GI as evidenced by severe wt loss and FTT prior to admit, reliant on TPN support since admit to provide for maintenance nutrition needs while PO for comfort, now with plan to re-trial enteral nutrition support to reassess for tolerance and ability to wean off TPN support       INTERVENTIONS  Implementation  Parenteral Nutrition/IV Fluids : no changes today  EN: started with slow advancement     Goals  Total avg nutritional intake to meet a minimum of 25 kcal/kg and 1.2 g PRO/kg daily (per dosing wt 45.5 kg EDW ).      Monitoring/Evaluation  Progress toward goals will be monitored and evaluated per protocol.        Tres Vazquez RD/MAGDA  7C [phone] ([phone]) RD Dietitian  Available on Vocera - can search by name or unit Dietitian  **Clinical Nutrition is no longer available via pager

## 2024-03-14 NOTE — PROGRESS NOTES
Nephrology Progress Note  03/14/2024         Assessment & Recommendations:   Sofie Rodriguez is a 63 year old year old female with ESKD, COPD s/p b/l lung transplant in 6/2022 with multiple complications, gastroparesis s/p GJ tube, GIB, chronic diarrhea, recurrent c-diff, FTT with inability to tolerate any tube feeds, R hip fx s/p ORIF 12/2023, admitted on 2/10 for initiation of TPN/lipids, transferred to ICU on 2/17 with worsening mental status and respiratory acidosis in spite of bipap, ultimately intubated on 2/18, being treated for PNA, also with volume overload in setting of high volume TPN. Persistent respiratory acidosis in spite of volume off, transferred to ICU for hypotension and respiratory failure, improved on bipap, now floor status again 3/1    # ESKD - TTS, LUE AVG, 3hr, 45.5kg (will be lowered), [facility], Dr. Andres Fairbanks. She has been losing weight and now even below her recent set EDW.  - With TPN condensed to 640 ml max per day, we can manage volume with HD 3x/week  - Continue HD on TTS schedule  - Requires EMLA cream an hour before HD  - please avoid PICC which will compromise future dialysis accesses; a midline is much preferred for this patient       # Dialysis access: LUE AVG placed 3-4 months ago, per pt. She had arm swelling and a fistulogram a couple months ago and swelling improved but now has redeveloped.  - US with c/f possible steal syndrome  - per vascular surgery, no concern for steal syndrome, ok to go ahead with fistulogram  - given that AVF is working well on dialysis (450 BFR) and at this time IR is only able to perform fistulograms when AVF isn't working well, will defer to OP for management.    # Persistent respiratory acidosis:    - pt doesn't tolerated bipap and often refuses to wear  - transplant pulm following    # Volume/BP: Anuric; on metoprolol soln 25 mg bid (held)  - gently challenging EDW as able, will achieve 41 kg today  - please record TPN in I/O's (not  "being recorded most days)  - appreciate condensing TPN (currently 400-600 ml per day)       # Nutrition: on TPN and regular diet  - per team, concern is that pt isn't absorbing much PO or tube feeds with diarrhea increasing significantly with increase in tube feeds; thus needing TPN     # Anemia 2/2 ESKD  On Venofer 50 qwk, Mircera last dose 1/9/2024  - hgb 9's  - Will continue Venofer 50 mcg q week (Tuesday)  - continue epogen 8000 units via HD    # BMD: Ca 8-9's, phos 4.7, alb 3.4  - renvela is held         Recommendations were communicated to primary team via note     RABIA Moctezuma   Division of Renal Disease and Hypertension  [phone]    Interval History :   Seen on dialysis, UF goal 2 kg, tolerating well. No n/v, CP, SOB, chills    Review of Systems:   4 point ROS neg other than as noted above    Physical Exam:   I/O last 3 completed shifts:  In: 10 [I.V.:10]  Out: -    /69   Pulse 91   Temp 98.2  F (36.8  C) (Oral)   Resp 20   Ht 1.57 m (5' 1.81\")   Wt 43.2 kg (95 lb 3.8 oz)   SpO2 98%   BMI 17.53 kg/m       GENERAL APPEARANCE: NAD  PULM: diminished  CV: regular rhythm, normal rate      -trace to 1+pedal and ankles, (LUE (fistula arm) > RUE)  GI: soft   INTEGUMENT: no cyanosis, no rash  NEURO: a/o3  Access Left AVG     Labs:   All labs reviewed by me  Electrolytes/Renal -   Recent Labs   Lab Test 03/14/24  0553 03/13/24  0620 03/12/24  0613    135 139   POTASSIUM 4.3 3.8 4.2   CHLORIDE 104 101 102   CO2 24 25 24   BUN 85.4* 60.0* 105.0*   CR 4.30* 3.05* 4.84*   * 119* 155*   ESTUARDO 8.8 8.8 8.8   MAG 1.9 1.7 2.0   PHOS 4.7* 3.5 4.8*       CBC -   Recent Labs   Lab Test 03/14/24  0553 03/12/24  0613 03/10/24  0608   WBC 6.2 6.3 6.3   HGB 9.6* 9.7* 9.7*    227 217       LFTs -   Recent Labs   Lab Test 03/14/24  0553 03/13/24  0620 03/12/24  0613   ALKPHOS 111 103 98   BILITOTAL 0.2 0.3 0.3   ALT 6 <5 <5   AST 16 20 19   PROTTOTAL 5.7* 6.1* 5.6*   ALBUMIN 3.4* 3.5 3.3* "       Iron Panel -   Recent Labs   Lab Test 09/26/22  0555 09/03/22  1039 08/24/22  0810   IRON 54 21* 41   IRONSAT 22 9* 21   CARLOS 769* 343* 334*         Imaging:  All imaging studies reviewed by me.     Current Medications:   calcium carbonate-vitamin D  1 tablet Per J Tube TID w/meals    cyanocobalamin  500 mcg Per Feeding Tube Daily    cycloSPORINE modified  125 mg Per G Tube BID IS    fidaxomicin  200 mg Oral BID    heparin ANTICOAGULANT  5,000 Units Subcutaneous Q12H    levalbuterol  1.25 mg Nebulization 2 times daily    levothyroxine  25 mcg Oral QAM AC    lidocaine  1 patch Transdermal Q24h    liothyronine  2.5 mcg Oral BID    lipids 4 oil  250 mL Intravenous Once per day on Monday Tuesday Wednesday Thursday Friday    [Held by provider] metoprolol  25 mg Per J Tube BID    pantoprazole  40 mg Per J Tube BID    predniSONE  5 mg Per J Tube QAM    And    predniSONE  2.5 mg Per J Tube QPM    [Held by provider] sevelamer carbonate (RENVELA)  0.8 g Oral BID    sodium chloride (PF)  10 mL Intracatheter Q8H    sulfamethoxazole-trimethoprim  1 tablet Oral Q Mon Wed Fri AM      dextrose      - MEDICATION INSTRUCTIONS -      parenteral nutrition - ADULT compounded formula CYCLE Stopped (03/14/24 0012)    - MEDICATION INSTRUCTIONS -      sodium chloride 0.9%      - MEDICATION INSTRUCTIONS -       RABIA Moctezuma, Rayna Boland, saw and evaluated this patient as part of a shared visit.  I have reviewed and discussed with the advanced practice provider their history, physical and plan.  I have personally performed the substantive portion of the medical decision making for this visit - please see the EMILY's documentation for the full details  I personally reviewed the vital signs, medications, labs and imaging.    Key findings and management decisions made by me:  ESRD on dialysis, dialysis today for volume and solute control. She is breathing well and tolerating HD.  Next run Saturday    Rayna Sifuentes  Krystian  Date of Service (when I saw the patient): 3/14

## 2024-03-14 NOTE — PROGRESS NOTES
Lung Transplant Consult Follow Up Note   March 14, 2024                Assessment and Plan:   Sofie Rodriguez is a 63 year old female with h/o COPD s/p BSLT (2022) with course complicated by post-operative hemidiaphragm palsy, recurrent PNAs, positive DSA, EBV viremia, hypogammaglobulinemia, severe gastroparesis s/p G/J tube placement (7/27/22) and pyloric botox (1/25/23), GI bleed 2/2 pyloric ulcer, hemobilia s/p ERCP and MRCP, chronic diarrhea, recurrent C diff colitis, ESRD on iHD, recurrent falls with injuries (recent right hip fx s/p ORIF 12/2023), deconditioning, and FTT.  Admitted 2/10 for initiation of TPN/lipids.  Transferred to ICU 2/17 for emergent intubation for hypoxic and hypercapneic respiratory failure with severe respiratory acidosis and encephalopathy.  iHD increased, infectious workup unremarkable. Extubated 2/19. Persistent and progressive hypercapnia with severe acidosis, returned to ICU 2/28-2/29 d/t tenuous respiratory status. Improvement noted when able to tolerate NIPPV. Several medication changes made 3/11 to evaluate effects on diarrhea. C diff positive 3/13.      Today's recommendations:  - Tacrolimus changed to cyclosporine, level pending 3/14- will adjust for you   - Taper off TPN (defer to primary team and dietitians), trial low rate TF  - Please repeat FVC and NIF today  - Would likely still pursue Colonoscopy post C diff treatment as she's had recurrent episodes of C diff but her diarrhea has never fully resolved  - Encourage increased oral intake and provide caloric supplements  - would repeat metabolic cart once stable nutrition plan is developed; elevated RQ suggests possible overfeeding   - Daily AM VBG  - Sputum cultures ordered if able to collect  - DSA pending from 3/6  - Repeat CMV, Prospera, and EBV ordered 3/18  - iHD 4x/week schedule (MTTSa), per nephrology would likely need this schedule while on TPN given volume reaccumulation, reassess when off TPN  - encourage ongoing  Palliative service support   - Care conference/Goals of care later this week    Management re-evaluation- Despite 30 days in the hospital, the patient has made very little progress. Minimal weight gain with TPN (although difficult to assess with weight fluctuations with dialysis). Requiring every other day dialysis while on TPN.  Neither TPN nor every other day dialysis is likely sustainable outside of an acute care hospital. Oral intake has improved (modestly). Diarrhea has been present since transplant despite stopping much of the early post transplant medications. Diarrhea is likely contributing to poor nutrition and possibly acidosis (with bicarb loss in the stool).   Need to consider tacrolimus or dapsone as possible contributers to diarrhea.  - Change from tacrolimus to cyclosporine with a goal of 140-170  - Change from dapsone to qMWF bactrim. If diarrhea persists, consider change to pentamidine  - Taper off TPN (defer to primary team and dietitian) and re-trial TFs at low rate. Encourage oral nutrition and provide caloric supplements.  - Consider colonoscopy if no improvements with above        Acute on chronic hypoxic/hypercapneic respiratory failure:  S/p bilateral sequential lung transplant for COPD:  Right hemidiaphragm palsy:   Hypoventilation, Suspected CARLEE: CT PTA (2/7) with decreased MARLON opacities but new tree in bud RLL opacities.  PFTs unchanged in clinic (but ATS criteria not met), remain significantly below her baseline.  Baseline hypoxia with 2L NC overnight.  Respiratory decompensation with encephalopathy and severe respiratory acidosis on 2/17.  S/p intubation 2/17-2/19.  Repeat CT (2/17) with new patchy consolidative and nodular opacities, GGO primarily in the bases and intralobular septal thickening (concerning for pulmonary edema given recent addition of TPN nutrition, new infection, PTLD, and/or septic emboli.  Bronch with lavage of RML (2/18) with very friable tissue, cutlures only with  C. glabrata.  S/p empiric Zosyn (2/17-2/24) and micafungin (2/18-2/21).  Severe respiratory acidosis with hypercapnia persisting with slight improvement with NIPPV treatment.  Persistent respiratory failure  also complicated by diaphragmatic weakness, hypoventilation, and deconditioning d/t malnutrition.  Repeat CT (2/26) with diffuse GG and consolidative opacities >BLL and diffuse interlobular septal thickening.       Intermittent tolerance of NIPPV, some improvement with transition to AVAPS. Neurology consulted 2/27. Transferred to ICU 2/28 given tenuous respiratory status and potential need for reintubation.  Improvement noted with continuous NIPPV with minimal interruptions (sodium bicarb also stopped, likely partially contributing), trial off NIPPV during the day started 2/29. MRI brain (3/1) with moderate leukoaraiosis, no acute intracranial pathology. Currently wearing AVAPS for several hours per night PCO2 began decreasing with bipap use and with decrease in feeds. Bipap adherence improved when she was taught how to take it off herself, significant claustrophobia.    - VBG improving slightly when wearing bipap and with deescalation of TPN  - Check daily AM VBG  - AVAPS at night and with naps, difficulty tolerating and intermittently refusing. Have discussed importance of wearing and will encourage her to wear tonight, will try to monitor tidal volumes when getting therapy  - NIF -40 and  on 3/5, would repeat this week to trend  - SNIFF testing performed 3/8- Movement of bilateral hemidiaphragm with respiration  - Sputum cultures ordered if able to collect  - Xopenex BID (2/27)  - Defer ABX at this time  - Sleep clinic eval indicated as OP  - Palliative following     Immunosuppression:  ImmuKnow low at 108 on 1/10.  AZA previously stopped 5/2023 d/t low ImmuKnow assay and EBV viremia.  Repeat ImmuKnow (2/27) low at 88.  Change from [place] to CSA 3/11 (see above).  - CSA goal 140-170.  Start CSA emulsion  125mg BID and check a level Thursday, 3/14 (ordered)  - Prednisone 5 mg qAM / 2.5 mg qPM     Prophylaxis:   - Discontinue dapsone (3/11). Start bactrim single strength qM/W/F.  If diarrhea persists, change to pentamidine.  - CMV ppx not currently indicated, D+/R+, CMV negative 2/19 (BAL very mildly positive 2/18, likely not clinically significant), repeat CMV ordered 3/18     Positive DSA: PFTs and pulmonary symptoms have remained stable as OP, so AMR treatment deferred given frailty.  Most recent DSA (2/7) with DQB2 mfi 6966 (from 5723 one month prior).  Most recent cell-free DNA Prospera (2/18) mildly elevated at 1.04 (concerning for possible rejection), which was increased from prior level of 0.12 (1/10).  IST increase deferred at that time per Dr. Gong.  S/p IVIG (2/27) for DSA (IgG WNL).  - Repeat DSA pending (3/6)  - Repeat Prospera ordered 3/18      EBV viremia: CT CAP (2/7) without lymphadenopathy.  Most recent level (2/18) improved to 28k from 96k (2/7)  - Repeat EBV ordered 3/18     Other relevant problems being managed by the primary team:      FTT:  Severe protein calorie malnutrition:  Gastroparesis s/p PEG/J, botox, and G-POEM:  SB hypomotility:  Pyloric ulcer:  Chronic nausea and osmotic diarrhea:  SIBO s/p rifaximin:   Recurrent C diff colitis: Chronic osmotic and infectious diarrhea since transplant with recurrent episodes of C diff.  Notable weight loss (40# in a year) d/t diarrhea, GI dysmotility, and intolerance of enteral feeds (PEG/J tube in place), most recently on elemental formula.  Extensive OP eval and f/u with GI.  S/p port placement for TPN and lipids.  Tolerating modest PO intake (likely absorbing minimal per GI), monitoring for refeeding syndrome. Recommend trial of enteral nutrition (see above). Now off TPN.   - Retrialing low dose enteral nutrition   - Encourage PO nutrition and oral spupplements.  - C diff positive yet again although do not think this is the sole cause of her  recurrent diarrhea, on fidaxomicin  - CT enterography recommended per GI, but unlikely to be able to tolerate the required 1.5 L enteral contrast bolus so deferring for now     ESRD: CT scan (with declining respiratory status) with volume overload secondary to TPN volume, iHD increased from PTA TThSa schedule with unsustained improvement.  Management per nephrology, dialysis via Bhagat CVC.    - iHD 4x/week schedule (MTTSa), per Nephrology      We appreciate the excellent care provided by the [place] [id number] team.  Recommendations communicated via this note and in person.  Will continue to follow along closely, please do not hesitate to call with any questions or concerns.    Claribel Franks MD  Pulmonary Transplant/CF Attending  Pager: [phone]  Vocera Web Console                  Interval History:   She is in brighter spirits with the entire medication turn around. She has been able to eat small meals (cheese burger or a sandwich) and then had less stools, only 3 overnight last night. No abdominal pain, no n/v no heartburn. She denies any shortness of breath. Could only wear bipap for 1 hour last night, no headaches this morning, feels like she slept well.            Review of Systems:   Per HPI          Medications:      calcium carbonate-vitamin D  1 tablet Per J Tube TID w/meals    cyanocobalamin  500 mcg Per Feeding Tube Daily    cycloSPORINE modified  125 mg Per G Tube BID IS    epoetin tre-epbx  8,000 Units Intravenous Once in dialysis/CRRT    fidaxomicin  200 mg Oral BID    heparin ANTICOAGULANT  5,000 Units Subcutaneous Q12H    levalbuterol  1.25 mg Nebulization 2 times daily    levothyroxine  25 mcg Oral QAM AC    lidocaine  1 patch Transdermal Q24h    liothyronine  2.5 mcg Oral BID    lipids 4 oil  250 mL Intravenous Once per day on Monday Tuesday Wednesday Thursday Friday    [Held by provider] metoprolol  25 mg Per J Tube BID    pantoprazole  40 mg Per J Tube BID    predniSONE  5 mg Per J Tube QAM     And    predniSONE  2.5 mg Per J Tube QPM    [Held by provider] sevelamer carbonate (RENVELA)  0.8 g Oral BID    sodium chloride (PF)  10 mL Intracatheter Q8H    sulfamethoxazole-trimethoprim  1 tablet Oral Q Mon Wed Fri AM     acetaminophen, albumin human, albuterol, calcium carbonate, artificial tears, dextrose, glucose **OR** dextrose **OR** glucagon, diphenhydrAMINE **OR** diphenhydrAMINE, hydrOXYzine HCl, lidocaine 4%, lidocaine (buffered or not buffered), lidocaine (buffered or not buffered), lidocaine (buffered or not buffered), lidocaine (buffered or not buffered), lidocaine-prilocaine, [Held by provider] loperamide, naloxone **OR** naloxone **OR** naloxone **OR** naloxone, - MEDICATION INSTRUCTIONS -, ondansetron **OR** ondansetron, - MEDICATION INSTRUCTIONS -, senna-docusate **OR** senna-docusate, sodium chloride, sodium chloride (PF), sodium chloride (PF), sodium chloride 0.9%, sodium chloride 0.9%, - MEDICATION INSTRUCTIONS -         Physical Exam:   Temp:  [98.2  F (36.8  C)-98.7  F (37.1  C)] 98.2  F (36.8  C)  Pulse:  [] 101  Resp:  [17-20] 20  BP: (127-152)/(61-73) 133/61  SpO2:  [92 %-100 %] 100 %        Intake/Output Summary (Last 24 hours) at 3/14/2024 1100  Last data filed at 3/14/2024 0546  Gross per 24 hour   Intake 10 ml   Output --   Net 10 ml         Constitutional:   Awake, alert and in no apparent distress     Eyes:   nonicteric     ENT:    oral mucosa moist without lesions     Neck:   Supple without supraclavicular or cervical lymphadenopathy     Lungs:   Good air flow.  No crackles. No rhonchi.  No wheezes.     Cardiovascular:   Normal S1 and S2.  RRR.  II/VI sys murmur. No gallop. No rub.     Abdomen:   NABS, soft, nondistended, mild diffuse tenderness.  No HSM.     Musculoskeletal:   No edema     Neurologic:   Alert and conversant.     Skin:   Warm, dry.  No rash on limited exam.             Data:   All laboratory and imaging data reviewed.    Results for orders placed or  performed during the hospital encounter of 02/10/24 (from the past 24 hour(s))   CBC with Platelets & Differential    Narrative    The following orders were created for panel order CBC with Platelets & Differential.  Procedure                               Abnormality         Status                     ---------                               -----------         ------                     CBC with platelets and d...[[id number]]  Abnormal            Final result               Manual Differential[[id number]]          Abnormal            Final result                 Please view results for these tests on the individual orders.   Comprehensive metabolic panel   Result Value Ref Range    Sodium 140 135 - 145 mmol/L    Potassium 4.3 3.4 - 5.3 mmol/L    Carbon Dioxide (CO2) 24 22 - 29 mmol/L    Anion Gap 12 7 - 15 mmol/L    Urea Nitrogen 85.4 (H) 8.0 - 23.0 mg/dL    Creatinine 4.30 (H) 0.51 - 0.95 mg/dL    GFR Estimate 11 (L) >60 mL/min/1.73m2    Calcium 8.8 8.8 - 10.2 mg/dL    Chloride 104 98 - 107 mmol/L    Glucose 117 (H) 70 - 99 mg/dL    Alkaline Phosphatase 111 40 - 150 U/L    AST 16 0 - 45 U/L    ALT 6 0 - 50 U/L    Protein Total 5.7 (L) 6.4 - 8.3 g/dL    Albumin 3.4 (L) 3.5 - 5.2 g/dL    Bilirubin Total 0.2 <=1.2 mg/dL   Tacrolimus by Tandem Mass Spectrometry   Result Value Ref Range    Tacrolimus by Tandem Mass Spectrometry <1.0 (L) 5.0 - 15.0 ug/L    Tacrolimus Last Dose Date      Tacrolimus Last Dose Time      Narrative    This test was developed and its performance characteristics determined by the [facility],  Special Chemistry Laboratory. It has not been cleared or approved by the FDA. The laboratory is regulated under CLIA as qualified to perform high-complexity testing. This test is used for clinical purposes. It should not be regarded as investigational or for research.   Blood gas venous   Result Value Ref Range    pH Venous 7.21 (L) 7.32 - 7.43    pCO2 Venous 67 (H) 40 - 50 mm Hg     pO2 Venous 61 (H) 25 - 47 mm Hg    Bicarbonate Venous 27 21 - 28 mmol/L    Base Excess/Deficit Venous -2.2 -3.0 - 3.0 mmol/L    FIO2 30     Oxyhemoglobin Venous 84 (H) 70 - 75 %    O2 Sat, Venous 86.4 (H) 70.0 - 75.0 %    Narrative    In healthy individuals, oxyhemoglobin (O2Hb) and oxygen saturation (SO2) are approximately equal. In the presence of dyshemoglobins, oxyhemoglobin can be considerably lower than oxygen saturation.   Magnesium   Result Value Ref Range    Magnesium 1.9 1.7 - 2.3 mg/dL   Phosphorus   Result Value Ref Range    Phosphorus 4.7 (H) 2.5 - 4.5 mg/dL   CBC with platelets and differential   Result Value Ref Range    WBC Count 6.2 4.0 - 11.0 10e3/uL    RBC Count 2.62 (L) 3.80 - 5.20 10e6/uL    Hemoglobin 9.6 (L) 11.7 - 15.7 g/dL    Hematocrit 32.3 (L) 35.0 - 47.0 %     (H) 78 - 100 fL    MCH 36.6 (H) 26.5 - 33.0 pg    MCHC 29.7 (L) 31.5 - 36.5 g/dL    RDW 20.0 (H) 10.0 - 15.0 %    Platelet Count 212 150 - 450 10e3/uL    % Neutrophils      % Lymphocytes      % Monocytes      % Eosinophils      % Basophils      % Immature Granulocytes      NRBCs per 100 WBC 0 <1 /100    Absolute Neutrophils      Absolute Lymphocytes      Absolute Monocytes      Absolute Eosinophils      Absolute Basophils      Absolute Immature Granulocytes      Absolute NRBCs 0.0 10e3/uL   Manual Differential   Result Value Ref Range    % Neutrophils 74 %    % Lymphocytes 8 %    % Monocytes 11 %    % Eosinophils 4 %    % Basophils 0 %    % Myelocytes 1 %    % Promyelocytes 2 %    Absolute Neutrophils 4.6 1.6 - 8.3 10e3/uL    Absolute Lymphocytes 0.5 (L) 0.8 - 5.3 10e3/uL    Absolute Monocytes 0.7 0.0 - 1.3 10e3/uL    Absolute Eosinophils 0.2 0.0 - 0.7 10e3/uL    Absolute Basophils 0.0 0.0 - 0.2 10e3/uL    Absolute Myelocytes 0.1 (H) <=0.0 10e3/uL    Absolute Promyelocytes 0.1 (H) <=0.0 10e3/uL    RBC Morphology Confirmed RBC Indices     Platelet Assessment  Automated Count Confirmed. Platelet morphology is normal.      Automated Count Confirmed. Platelet morphology is normal.    Polychromasia Slight (A) None Seen     *Note: Due to a large number of results and/or encounters for the requested time period, some results have not been displayed. A complete set of results can be found in Results Review.

## 2024-03-14 NOTE — PHARMACY-CONSULT NOTE
Pharmacy Tube Feeding Consult    Medication reviewed for administration by feeding tube and for potential food/drug interactions.    Recommendation: Recommend holding tube feedings for 1 hours before and 1 hours after administration of  levothyroxine  which is scheduled to be given at 0600 daily.    Pharmacy will continue to follow as new medications are ordered.    Timmy Warner, PharmD  PGY-1 Pharmacy Resident

## 2024-03-15 LAB
ALBUMIN SERPL BCG-MCNC: 3.6 G/DL (ref 3.5–5.2)
ALP SERPL-CCNC: 117 U/L (ref 40–150)
ALT SERPL W P-5'-P-CCNC: 8 U/L (ref 0–50)
ANION GAP SERPL CALCULATED.3IONS-SCNC: 11 MMOL/L (ref 7–15)
AST SERPL W P-5'-P-CCNC: 17 U/L (ref 0–45)
BASE EXCESS BLDV CALC-SCNC: 0.2 MMOL/L (ref -3–3)
BILIRUB SERPL-MCNC: 0.3 MG/DL
BUN SERPL-MCNC: 47.2 MG/DL (ref 8–23)
CALCIUM SERPL-MCNC: 8.7 MG/DL (ref 8.8–10.2)
CHLORIDE SERPL-SCNC: 100 MMOL/L (ref 98–107)
CREAT SERPL-MCNC: 2.72 MG/DL (ref 0.51–0.95)
DEPRECATED HCO3 PLAS-SCNC: 26 MMOL/L (ref 22–29)
EGFRCR SERPLBLD CKD-EPI 2021: 19 ML/MIN/1.73M2
GLUCOSE SERPL-MCNC: 110 MG/DL (ref 70–99)
HCO3 BLDV-SCNC: 30 MMOL/L (ref 21–28)
HOLD SPECIMEN: NORMAL
MAGNESIUM SERPL-MCNC: 1.7 MG/DL (ref 1.7–2.3)
O2/TOTAL GAS SETTING VFR VENT: 30 %
OXYHGB MFR BLDV: 82 % (ref 70–75)
PCO2 BLDV: 78 MM HG (ref 40–50)
PH BLDV: 7.2 [PH] (ref 7.32–7.43)
PHOSPHATE SERPL-MCNC: 3.5 MG/DL (ref 2.5–4.5)
PO2 BLDV: 58 MM HG (ref 25–47)
POTASSIUM SERPL-SCNC: 4 MMOL/L (ref 3.4–5.3)
PROT SERPL-MCNC: 5.9 G/DL (ref 6.4–8.3)
SAO2 % BLDV: 83.3 % (ref 70–75)
SODIUM SERPL-SCNC: 137 MMOL/L (ref 135–145)
TSH SERPL DL<=0.005 MIU/L-ACNC: 2.22 UIU/ML (ref 0.3–4.2)
VIT B12 SERPL-MCNC: 2191 PG/ML (ref 232–1245)

## 2024-03-15 PROCEDURE — 84443 ASSAY THYROID STIM HORMONE: CPT | Performed by: STUDENT IN AN ORGANIZED HEALTH CARE EDUCATION/TRAINING PROGRAM

## 2024-03-15 PROCEDURE — 250N000013 HC RX MED GY IP 250 OP 250 PS 637

## 2024-03-15 PROCEDURE — 84100 ASSAY OF PHOSPHORUS: CPT | Performed by: STUDENT IN AN ORGANIZED HEALTH CARE EDUCATION/TRAINING PROGRAM

## 2024-03-15 PROCEDURE — 250N000013 HC RX MED GY IP 250 OP 250 PS 637: Performed by: STUDENT IN AN ORGANIZED HEALTH CARE EDUCATION/TRAINING PROGRAM

## 2024-03-15 PROCEDURE — 82805 BLOOD GASES W/O2 SATURATION: CPT

## 2024-03-15 PROCEDURE — 250N000011 HC RX IP 250 OP 636

## 2024-03-15 PROCEDURE — 99233 SBSQ HOSP IP/OBS HIGH 50: CPT | Mod: 24 | Performed by: STUDENT IN AN ORGANIZED HEALTH CARE EDUCATION/TRAINING PROGRAM

## 2024-03-15 PROCEDURE — 99233 SBSQ HOSP IP/OBS HIGH 50: CPT | Mod: 24 | Performed by: INTERNAL MEDICINE

## 2024-03-15 PROCEDURE — 82607 VITAMIN B-12: CPT | Performed by: STUDENT IN AN ORGANIZED HEALTH CARE EDUCATION/TRAINING PROGRAM

## 2024-03-15 PROCEDURE — 250N000009 HC RX 250: Performed by: INTERNAL MEDICINE

## 2024-03-15 PROCEDURE — 36592 COLLECT BLOOD FROM PICC: CPT

## 2024-03-15 PROCEDURE — 94640 AIRWAY INHALATION TREATMENT: CPT

## 2024-03-15 PROCEDURE — 250N000009 HC RX 250

## 2024-03-15 PROCEDURE — 94660 CPAP INITIATION&MGMT: CPT

## 2024-03-15 PROCEDURE — 94640 AIRWAY INHALATION TREATMENT: CPT | Mod: 76

## 2024-03-15 PROCEDURE — 36415 COLL VENOUS BLD VENIPUNCTURE: CPT

## 2024-03-15 PROCEDURE — 83735 ASSAY OF MAGNESIUM: CPT | Performed by: STUDENT IN AN ORGANIZED HEALTH CARE EDUCATION/TRAINING PROGRAM

## 2024-03-15 PROCEDURE — 94150 VITAL CAPACITY TEST: CPT

## 2024-03-15 PROCEDURE — 99418 PROLNG IP/OBS E/M EA 15 MIN: CPT | Performed by: STUDENT IN AN ORGANIZED HEALTH CARE EDUCATION/TRAINING PROGRAM

## 2024-03-15 PROCEDURE — 250N000009 HC RX 250: Performed by: STUDENT IN AN ORGANIZED HEALTH CARE EDUCATION/TRAINING PROGRAM

## 2024-03-15 PROCEDURE — 999N000157 HC STATISTIC RCP TIME EA 10 MIN

## 2024-03-15 PROCEDURE — 250N000012 HC RX MED GY IP 250 OP 636 PS 637: Performed by: INTERNAL MEDICINE

## 2024-03-15 PROCEDURE — 250N000013 HC RX MED GY IP 250 OP 250 PS 637: Performed by: INTERNAL MEDICINE

## 2024-03-15 PROCEDURE — 120N000002 HC R&B MED SURG/OB UMMC

## 2024-03-15 PROCEDURE — 99233 SBSQ HOSP IP/OBS HIGH 50: CPT | Mod: GC | Performed by: STUDENT IN AN ORGANIZED HEALTH CARE EDUCATION/TRAINING PROGRAM

## 2024-03-15 PROCEDURE — 80053 COMPREHEN METABOLIC PANEL: CPT

## 2024-03-15 PROCEDURE — 250N000012 HC RX MED GY IP 250 OP 636 PS 637

## 2024-03-15 RX ORDER — CYCLOSPORINE 100 MG/ML
200 SOLUTION ORAL
Status: DISCONTINUED | OUTPATIENT
Start: 2024-03-15 | End: 2024-03-21

## 2024-03-15 RX ORDER — OLANZAPINE 5 MG/1
5 TABLET, ORALLY DISINTEGRATING ORAL AT BEDTIME
Status: DISCONTINUED | OUTPATIENT
Start: 2024-03-15 | End: 2024-04-15 | Stop reason: HOSPADM

## 2024-03-15 RX ORDER — CYCLOSPORINE 100 MG/ML
75 SOLUTION ORAL ONCE
Status: COMPLETED | OUTPATIENT
Start: 2024-03-15 | End: 2024-03-15

## 2024-03-15 RX ADMIN — Medication 40 MG: at 21:04

## 2024-03-15 RX ADMIN — Medication 40 MG: at 08:47

## 2024-03-15 RX ADMIN — HEPARIN SODIUM 5000 UNITS: 5000 INJECTION, SOLUTION INTRAVENOUS; SUBCUTANEOUS at 06:28

## 2024-03-15 RX ADMIN — OLANZAPINE 5 MG: 5 TABLET, ORALLY DISINTEGRATING ORAL at 23:19

## 2024-03-15 RX ADMIN — HEPARIN SODIUM 5000 UNITS: 5000 INJECTION, SOLUTION INTRAVENOUS; SUBCUTANEOUS at 17:30

## 2024-03-15 RX ADMIN — CYCLOSPORINE 75 MG: 100 SOLUTION ORAL at 10:15

## 2024-03-15 RX ADMIN — MAGNESIUM SULFATE HEPTAHYDRATE: 500 INJECTION, SOLUTION INTRAMUSCULAR; INTRAVENOUS at 20:59

## 2024-03-15 RX ADMIN — CALCIUM CARBONATE 600 MG (1,500 MG)-VITAMIN D3 400 UNIT TABLET 1 TABLET: at 08:48

## 2024-03-15 RX ADMIN — CYANOCOBALAMIN TAB 500 MCG 500 MCG: 500 TAB at 08:48

## 2024-03-15 RX ADMIN — FIDAXOMICIN 200 MG: 200 TABLET, FILM COATED ORAL at 21:04

## 2024-03-15 RX ADMIN — ONDANSETRON 4 MG: 2 INJECTION INTRAMUSCULAR; INTRAVENOUS at 18:43

## 2024-03-15 RX ADMIN — LEVALBUTEROL HYDROCHLORIDE 1.25 MG: 1.25 SOLUTION RESPIRATORY (INHALATION) at 10:18

## 2024-03-15 RX ADMIN — Medication 2.5 MCG: at 21:04

## 2024-03-15 RX ADMIN — CALCIUM CARBONATE 600 MG (1,500 MG)-VITAMIN D3 400 UNIT TABLET 1 TABLET: at 17:30

## 2024-03-15 RX ADMIN — CYCLOSPORINE 200 MG: 100 SOLUTION ORAL at 17:30

## 2024-03-15 RX ADMIN — Medication 2.5 MCG: at 08:49

## 2024-03-15 RX ADMIN — SMOFLIPID 250 ML: 6; 6; 5; 3 INJECTION, EMULSION INTRAVENOUS at 21:03

## 2024-03-15 RX ADMIN — Medication 5 MG: at 00:37

## 2024-03-15 RX ADMIN — FIDAXOMICIN 200 MG: 200 TABLET, FILM COATED ORAL at 08:48

## 2024-03-15 RX ADMIN — PREDNISONE 2.5 MG: 2.5 TABLET ORAL at 21:04

## 2024-03-15 RX ADMIN — LEVOTHYROXINE SODIUM 25 MCG: 0.03 TABLET ORAL at 06:28

## 2024-03-15 RX ADMIN — SULFAMETHOXAZOLE AND TRIMETHOPRIM 1 TABLET: 400; 80 TABLET ORAL at 08:48

## 2024-03-15 RX ADMIN — PREDNISONE 5 MG: 5 TABLET ORAL at 08:48

## 2024-03-15 RX ADMIN — CALCIUM CARBONATE 600 MG (1,500 MG)-VITAMIN D3 400 UNIT TABLET 1 TABLET: at 14:28

## 2024-03-15 RX ADMIN — LEVALBUTEROL HYDROCHLORIDE 1.25 MG: 1.25 SOLUTION RESPIRATORY (INHALATION) at 21:30

## 2024-03-15 RX ADMIN — LIDOCAINE 4% 1 PATCH: 40 PATCH TOPICAL at 21:11

## 2024-03-15 ASSESSMENT — ACTIVITIES OF DAILY LIVING (ADL)
ADLS_ACUITY_SCORE: 32
ADLS_ACUITY_SCORE: 29
ADLS_ACUITY_SCORE: 32
ADLS_ACUITY_SCORE: 29
ADLS_ACUITY_SCORE: 32
ADLS_ACUITY_SCORE: 29
ADLS_ACUITY_SCORE: 32
ADLS_ACUITY_SCORE: 29
ADLS_ACUITY_SCORE: 32
ADLS_ACUITY_SCORE: 29
ADLS_ACUITY_SCORE: 28
ADLS_ACUITY_SCORE: 29
ADLS_ACUITY_SCORE: 32
ADLS_ACUITY_SCORE: 29
ADLS_ACUITY_SCORE: 29

## 2024-03-15 NOTE — PROGRESS NOTES
Per bedside RN, patient only tolerated 45 minutes on AVAPS tonight,  citing general discomfort/strong dislike. Settings were AVAPS RR18  P25/10 EPAP4 35%. Otherwise remains on 1LPM NC.    Elmer Montero, RRT

## 2024-03-15 NOTE — PROGRESS NOTES
"Palliative Care Consultation Note  [facility]      Patient: Sofie Rodriguez  Date of Admission:  2/10/2024    Requesting Clinician / Team: Medicine  Reason for consult: Goals of care  Decisional support       Recommendations & Counseling     GOALS OF CARE:   Care conference today with primary medicine, lung transplant, palliative, and unit SW in Sofie's room with two daughters Julia and Charity present via phone conference.  Medical summary - Sofie was admitted for failure to thrive. Started on TPN with hopes to improve nutrition. Also noted to be hypercapnic. BiPAP does help with pCO2 but she is having difficulties coping with anxiety of the mask and (understandably) does not like wearing it. TPN also noted to be increasing volume so HD now was x4 weekly instead of just x3 weekly.   Noted that Sofie has \"gotten better\" compared to last week but also state that she is still weaker than she was prior to admission.  Nutrition/debility - plan to change from TPN to tube feeds (goal is 25 ml/hr as of now). PT now recommending home w/ assist. Continue therapy and strength gaining while inpatient. Supplement with po intake as well. Carb counting. Glucose checks until TFs are at goal. Goals if to change from a catabolic state to an anabolic state.   Hypercapnia - Hope that changing off TPN (might be worsening acidosis?), treating C diff (reduce diarrhea to bicarb losses), and using various methods of making BiPAP more tolerable would help fix this issue (Zyprexa at bedtime, nasal pillows, better mask fit, other sleep team/RT recs). Goal pH >7.2 (ideally would want it to be compensated). Have also (not lightly) adjusted immunosuppressive medications to help with diarrhea and hypercapnia. Also plan to involve neuro to opinion to rule-out how neuro issues as a cause for hypercapnia.  HD - going back to x3 weekly, getting off TPN should help with this.  Sofie affirmed that she wanted " Released to [address] [place] [address] [address] "to go home.   Advised Sofie that she has been through A LOT since he lung transplant and that she is still her own person and the \"captain of the ship\". Noted to her that if she would like to pursue a comfort-oriented pathway, we can speak about that more. Sofie affirmed that she would like to continue on a restorative pathway.   Estimated (best-case scenario) timeline for Sofie to discharge home would be in ~2 weeks  Plan of care - continue restorative cares without limits    ADVANCE CARE PLANNING:  No HCD on file, Sofie noted that her daughter, Charity, should be her surrogate decision maker if she were unable to make decisions for herself   There is no POLST form on file,  recommend continued medical treatment and FULL CODE   Code status: Full Code    MEDICAL MANAGEMENT:   We are not actively managing symptoms at this time.    PSYCHOSOCIAL/SPIRITUAL SUPPORT:  Sofie finds fidel mostly in her friends and family. She lives in [facility] in a house with one of her daughters and three of her grandchildren. She has two other adult children as well and four other grandchildren as well.  [employer] but not involved with the [employer]  Declined PSS needs    Palliative Care will follow peripherally. We will continue to chart check intermittently. Please page if needed sooner. If no needs are noted early next week, we will consider signing-off.    Total time spent was 80 minutes regarding goals of care and support on the date of the encounter. These recommendations were given to the primary team via this note/in-person.    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 Her " hospital course has been complicated by worsening CO2 retention, pulmonary edema, and AMS requiring BiPAP and intubation with ICU transfer on 2/17. She was extubated and transferred out of the ICU on 2/19. Tolerated iHD on 2/02. Readmitted to the ICU on 2/28 for monitoring due to hypercarbia and respiratory acidosis but improved with AVAPS and transferred back to the floor 2/29. She has been struggling with needing HD x4 weekly from fluid overload from TPN (unable to tolerate po intake due to GI issues) and declining AVAPS due to discomfort. Palliative signed off 3/7. We were reconsulted 3/11 for goals of care.    Today, the patient was seen for:  Status post bilateral lung transplant with hemidiaphragm palsy  PEG dependent  TPN dependent  ESRD on HD (x4 weekly)  ESRD  [facility]   Support  Encounter for palliative care      Palliative Care Summary:   Seen and examined at bedside. No needs noted. Care conference as above.     Medications:  I have reviewed this patient's medication profile and medications from this hospitalization.        Physical Exam   Vital Signs with Ranges  Temp:  [98.5  F (36.9  C)-99  F (37.2  C)] 99  F (37.2  C)  Pulse:  [90-96] 90  Resp:  [18-20] 18  BP: (133-148)/(65-74) 133/65  SpO2:  [93 %-100 %] 94 %  95 lbs 3.82 oz    Very thin, middle aged woman laying in bed. Appears comfortable, Scattered ecchymosis of varying ages across her face, neck and upper extremities. On room air. Alert/oriented x4.    Data reviewed:  CMP  Recent Labs   Lab 03/15/24  0554 03/14/24  0553 03/13/24  0620 03/12/24  0613    140 135 139   POTASSIUM 4.0 4.3 3.8 4.2   CHLORIDE 100 104 101 102   CO2 26 24 25 24   ANIONGAP 11 12 9 13   * 117* 119* 155*   BUN 47.2* 85.4* 60.0* 105.0*   CR 2.72* 4.30* 3.05* 4.84*   GFRESTIMATED 19* 11* 17* 10*   ESTUARDO 8.7* 8.8 8.8 8.8   MAG 1.7 1.9 1.7 2.0   PHOS 3.5 4.7* 3.5 4.8*   PROTTOTAL 5.9* 5.7* 6.1* 5.6*   ALBUMIN 3.6 3.4* 3.5 3.3*   BILITOTAL 0.3 0.2 0.3 0.3   ALKPHOS 117  111 103 98   AST 17 16 20 19   ALT 8 6 <5 <5     CBC  Recent Labs   Lab 03/14/24  0553 03/12/24  0613 03/10/24  0608 03/09/24  0623   WBC 6.2 6.3 6.3 6.2   RBC 2.62* 2.59* 2.69* 2.56*   HGB 9.6* 9.7* 9.7* 9.5*   HCT 32.3* 32.1* 33.1* 32.0*   * 124* 123* 125*   MCH 36.6* 37.5* 36.1* 37.1*   MCHC 29.7* 30.2* 29.3* 29.7*   RDW 20.0* 20.3* 21.0* 21.5*    227 217 229     INR  Recent Labs   Lab 03/11/24  0536   INR 1.02     Arterial Blood Gas  Recent Labs   Lab 03/15/24  0554 03/14/24  0553 03/13/24  0620 03/12/24  0613   O2PER 30 30 21 20

## 2024-03-15 NOTE — PLAN OF CARE
Goal Outcome Evaluation:      Plan of Care Reviewed With: patient    Overall Patient Progress: no changeOverall Patient Progress: no change    Outcome Evaluation: Alert and oriented x4.  VSS on RA.  Patient agrees to wear Bipap at midnight, will pass on to next nurse.  Tube feed started at 15 ml/hr, patient tolerating well.   Cycled TPN and lipids started and running to red lumen PICC at ordered rate, purple lumen SL.  Meds crushed and given through J tube.   Several loose bowel movements this shift on commode.  Denies pain or nausea.   Continue to monitor and follow plan of care.

## 2024-03-15 NOTE — PROGRESS NOTES
Care Management Follow Up    Length of Stay (days): 34    Expected Discharge Date:  TBD     Concerns to be Addressed: discharge planning, other (see comments) (New TPN)     Patient plan of care discussed at interdisciplinary rounds: Yes    Anticipated Discharge Disposition: Transitional Care, Home Care, Dialysis Services     Anticipated Discharge Services:  Prior to hospitalization and per initial Care Management Consult note, the following services were in place:     [facility]: Pt has chair time of 11:45 AM T/TH/Sat  Phone number: [phone]  Fax: [phone]     [facility] - [phone]     Home [address] through [employer] [facility]: Fax: [phone] Phone: [phone]  [facility] Refferal: Fax [phone]     Pulmonary rehab at [facility]  Fax to [phone].      [facility]: Pt has chair time of 11:45 AM T/TH/Sat  Phone number: [phone]  Fax: [phone]  Anticipated Discharge DME: None    Patient/family educated on Medicare website which has current facility and service quality ratings:  TBD  Education Provided on the Discharge Plan: Yes  Patient/Family in Agreement with the Plan: yes, final GOC TBD    Referrals Placed by CM/SW: External Care Coordination  Private pay costs discussed: Not applicable    Care Conference:    Present: Dr. Diaz and Dr. Moser form Medicine service. Dr. Franks and myself from lung transplant.  Dr. Pink from palliative.  Patient was present, her daughters, Charity and Julia were on conference call.      Dr. Diaz and Dr. Franks gave on update on patient condition and course of hospitalization.  Plan will be to attempt to make AVAPS/Bipap more comfortable for patient so that she can tolerate it longer. Neurology will be consulted to assess if patient muscle weakness is playing a role in her high CO2 levels.  Her TPN is being transitioned off as her tube feedings are increased.  This will allow patient to decrease to a  3/week dialysis schedule. Will likely take another couple of weeks in hospital to ascertain if she is stable from a nutritional and respiratory stand point.  At this point therapies are recommending a home discharge.     will continue to follow for support, counseling, education and resources.      Mana Valdivia [facility]  Lung Transplant   Phone: [phone] Pager: [phone]

## 2024-03-15 NOTE — PROGRESS NOTES
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 On 2/17/24 she was transferred to ICU for worsening mental status and acute hypoxic and hypercarbic respiratory failure inspite of BiPAP requiring intubation. She was suspected to have PNA and started on antibiotics. Extubated on 2/19 without complications and tolerated iHD on 2/20, and tolerated PO regular diet in addition to TPN. Developed progressively worsening respiratory acidosis and hypercarbia, and was re-admitted to ICU on 2/28/24 for close monitoring of intermittent BiPAP and possible intubation, however she improved on AVAPS setting and well enough to transfer back to medicine floor on 2/29/24. Currently working on balancing volume status with current TPN plan, improving ventilation, and GOC/disposition planning.       Changes today:   - Wore bipap 45mins overnight  - Care conference today  -Last night of TPN  -MIP/MEP significantly decreased; will consult Neurology (concern for neuromuscular component contributing to pCO2 retention)  - Cont TF, weaning down TPN ideally  -Check labs for neuropathic  pain    #C  Care conference on 3/15 with Transplant Pulm, [facility], Medicine, daughters (Julia Nash) and Transplant SW. Overall medical updates provided and Qs answered. MIP/MEP worsened; thus concern for neuromuscular component contributing to CO2 retention, thus will consult Neuro. Claustrophobia inhibiting pt to wear BiPAP; will start on olanzapine. Will be off TPN by 3/16 ideally. Can do 3d/wk dialysis if TPN off.Will continue PT and c.diff treatment.     #Severe respiratory acidosis, improved on BiPAP  #AHRF (resolved)  #Acute hypercarbic respiratory failure  #S/P Lung transplant 2022 c/b right hemidiaphragm palsy  #Recurrent pneumonia, resolved  Patient received a bilateral lung transplant 6/28/22 for COPD. Was admitted 2/10 to address malnutrition and admitted to ICU on 2/17 with hypoxic hypercarbic respiratory failure. Intubated on 2/18 AM  due to worsening oxygen requirements, likely due to pulmonary edema given hx of ESRD requiring HD vs infection given hx of lung transplant, immunosuppressed status, and recurrent pneumonias. Extubated on 2/19 without complications. Has had issues with rising pCO2 that is responsive to BiPAP, but due to refusal, has required transferred to ICU (on 2/28 AM). Neurology consulted and MRI r/o central cause problem for respiratory drive. Started on AVAPS with improvement in mental status and VBG, and patient transferred back to medicine floor on 2/29.    - PRN hypertonic saline inhaler with albuterol nebs  - Transplant pulmonology following, recs appreciated  - PTA immunosuppressive agent  >Prednisone 5 mg every morning, 2.5 mg every afternoon; Stop tacrolimus (goal 6-8; 4 mg AM/ 3.5mg PM) and cont Cyclosporin (3/11-*)    > overnight oximetry study suggestive of O2 vs CPAP need, as did require up to 2LPM overnight to prevent hypoxia  > Try to minimize O2 to preserve respiratory drive, will give IVIG for DSA+   - avoid HFNC, maintain minimum oxygen to keep SaO2 >85%   -  continue AVAPS when sleeping (overnight and during naps)  - MIP/MEP testing significantly decreased from previous--> consult Neuro to eval for neuromuscular cause of pCO2 retention  - Discuss GOC w/ Pall Care; Care conf this week  - Stop PTA dapsone MWF for PJP prophylaxis; Cont Bactrim (3/11)    - Limit medications that would depress respiration   - d/c'd theophylline 3/3/24 due to difficulty attaining therapeutic levels (started by ICU)  - continue thyroid function as below  - awaiting metabolic CART study results  - may need BiPAP at home as patient reports not having one. Sleep clinic referral at discharge.   -       Antibiotics:  Vancomycin (2/17 - 2/17)  Zosyn (2/17 - 2/23) for HAP  Bactrim MWF, PJP ppx (previously on Dapson)  Micafungin (2/18- 2/21)  Fidaxomicin (3/13-*)     Immunosuppression  Cyclosporin (previously on Tacro)  Prednisone     #Severe malnutrition   #FTT   #Hypoalbuminemia  #Gastroparesis, small bowel dysmotility  #S/P PEG/J with intolerance of enteral nutrition  # Chronic osmotic diarrhea/SIBO s/p Rifaximin  #Recurrent C.Diff (3rd ep)    Patient with gastroparesis (presumed due to vagal injury) and small bowel dysmotility complicated by unintentional 40lb weight loss over the past year and now severe malnutrition. Previously intolerant to oral food intake due to nausea. Was initially admitted for portacath and TPN initiation since 2/13. After extubation has been able to tolerate feeds without n/v from 2/20. Electrolytes trending towards baseline today.  Per GI, next steps for workup for malnutrition would include CT enterography to evaluate for anatomic abnormalities contributing to malnutrition vs possible treatment for SIBO (though patient has had multiple courses of treatment in the past with no long term improvement). Patient couldn't tolerate the oral load for CT enterography on 2/21, so will defer this until she is able to tolerate greater PO load. On 2/23 , again discussed with patient  the need of small bowel motility study if not improving outpatient through [facility]. No ongoing concerns for SIBO on 2/23 as patient is passing multiple episodes of stools and gas with no abdominal pain or distention. C-diff test negative on 2/21. Per RD, patient tolerating >300 kcal of intake PO currently, so stopped trickle Tube feeds and continuing with PO and TPN for nutrition (sufficient for preventing gut atrophy).  Tunneled CVC removed on 3/7 without complications, after PICC placement. As of 3/11 transplant pulmonology is worried that TPN is not a realistic plan to continue at home and would like to transition back to TF (RD oncerned pt is not absorbing any oral intake). Transplant pulm aslo worried that her tacro and dapson could be contributing to diarrhea (mucosal toxicity). Repeat enteric studies showed recurrent C.diff;  Fidaxomicin x 10 days started (GI approved). Transplant pulm still requesting repeat colonoscopy w/ Bx after completion of c.diff treatment, to  r/o toxicity or other reason that diarrhea is present (since it has not improved w/ previous c.diff tx).  - Regular diet +  TPN +  Restart tube feeds per transplant pulm request   -Nutrition consulted, they have discussed plan with GI; TPN to complete 3/16  -GI consulted/signed off; appreciate recs   -PO Fidaxomicin 200 mg BID for 10 days    -Will reconsult after tx of C.diff for colonoscopy  - Nephrology consulted; appreciate recs  -limit fluid < 1.5 L per day for TPN ( chart vol of TPN in the I/O).     -May benefit from small bowel motility study if not improving outpatient through [facility].     #B/L Neuropathic Pain   New pain b/l over the past few days. Last A1c <4.2 (7/11/23). End stage renal dz can cause it too, but will check for vitamin/mineral deficiencies. Medications can also be a culprit, and unsure if  fidaxomicin  has a side effect of neuropathy. Will evaluate more  -Vit B6, B12, TSH ordered  -Consider gabapentin in the coming days      #Acute on chronic anemia 2/2 ESRD  Hgb stable 7-8s, with no acute signs of bleeding. Aute drop 2/29 from 8.2 > 6.6 after two rechecks, no overt signs of bleeding; required 1U pRBC transfusion.    - continue epogen per nephrology with dialysis  - venofer 50 mcg qweek with dialysis     #ESRD on HD M/T/Th/Sat  #Hypervolemic hyponatremia  #Anion gap metabolic acidosis - resolved  #mild hyperphosphatemia  Patient is ESRD on T/Th/Sat HD as outpt, Hgb stabilizing. HD tolerating well. Due to TPN vol, pt requires 4x/wk dialysis (2hr UF on M; HD T/TH/Sat 3hrs). This new frequency of dialysis does make it challenging for placement or going home.   - Continue Venofer injections    - CBC and CMP daily  - Continue epogen dose 8000 units as per nephrology  - Strict I/Os  - HD per nephrology given hypervolemia; Plan for 2hr UF run on M; 3hr on T/Th/S. If TPN 640ml max daily, can manage volume with TTS HD    # Elevated TSH and low T4 and T3  Patient with new low T4 at 0.64 and TSH at 6.5, concerning for new hypothyroidism vs sick thyroid syndrome. TSH with appropriate response, more consistent with elevation in the setting of acute illness. ICU team on 2/28 recommended initiation of treatment for possible hypothyroid as this can improve respiratory muscle function in the setting of weakness and malnutrition.   - continue liothyronine and levothyroxine per ICU team recommendations  - repeat thyroid function 3/7 wnl    #Left upper extremity unilateral edema, improving   #Bilateral pedal and ankle edema, improving  Edema due to third spacing due to hypoalbuminemia vs HF. BNP >93971 at admission, Echo on 2/18 shows EF of 55-60% with collapsible IVC. Extremity edema 2/2 to hypoalbuminemia. Upper limb duplex USG on 2/18 negative for DVT.  USG left arm on 2/21 shows steel physiology and Vascular Surgery consulted (see below). Overall edema in extremities have improved significantly since initiating 4x/wk dialysis.    - Continue daily  weights  - Strict I/Os  - Elevation and wrapping of only lower legs as needed and increased UF per nephrology for volume management; no wrapping of Left upper extremity with dialysis fistula  - lymphedema consulted for BLE edema  - HD as noted above      #Steal physiology of LUE dialysis access fistula  LUE arterial US obtained 2/21 with concern for LUE edema. US of fistula demonstrated steal physiology. Discussed with nephrology, and vascular surgery consulted on 2/22. Vascular surgery has only minor concerns for steal symptoms and given that the AVF is working well, they are okay with outpatient fistulograms/ venoplasty with wrist brachial index and PPG's, unless new concerns arise.  - plan for outpatient workup as above; nephrology in agreement with outpatient workup.     #Facial and neck bruising  #Pain  Reports soreness in her neck from lying in bed. No soreness in the buttocks/ back. Patient has multiple bruises over her arms and face which is attributed to previous falls. No new bruising reported today. Patients reports her headaches are improving with tylenol. Using heating pads and lidocaine patch for body pains. Couple of small skin tears noted by the Rn's. Skin care done accordingly.  - Tylenol Q4  - Lidocaine patch prn  - Heating pads prn  - Diligent skin cares    # HTN  # A-fib, resolved  Noted atrial fibrillation on arrival with HR elevated at 150, not sustained for > 10 minutes and otherwise hemodynamically stable. RVR resolved w/o medications.   - PTA metoprolol 25mg BID - holding for now with lower BP with increased UF per nephrology, resume if becoming more hypertensive  - Continuous telemetry     # Encephalopathy secondary to hypercarbia - resolved  Patient was progressively more lethargic on 2/17 during admission in [facility]. Etiology likely secondary to hypercarbia in context of respiratory failure as patient was noted to have high CO2s concomitant with lethargy. Though receiving  oxycodone and fentanyl, lethargy was only partially alleviated by narcan. LFTs and ammonia wnl with low suspicion of hepatic encephalopathy. BUN wnl with low suspicion for uremic encephalopathy. Head CT on 2/18 was negative with low suspicion of acute intracranial pathology. Patient is awake, alert, oriented and following commands since 2/20.     # Right hip fracture s/p ORIF (December 2023)   - PT/OT    # GERD  - PTA PPI    # Hx EBV viremia   # Hypogammaglobulinemia  # Chronic immunosuppression 2/2 lung transplant  Patient has been afebrile and has not had leukocytosis however she is under immunosuppression. Given acute respiratory failure and hx of recurrent pneumonias, she was started on empiric abx for concerns over new pneumonia. RVP and cultures no growth to date. Last day of empirical treatment for HAP.  - EBV 27K (decreased compared to prior)     # RLE edema and pain - U/S DVT without DVT     Lines/tubes/drains:  - PIV x2  - PEG/J  - HD AV fistula, left arm   - PICC for TPN        Diet: Renal Diet (dialysis)  Calorie Counts  Adult Formula Drip Feeding: Continuous [employer] Renal Support; Jejunostomy; Goal Rate: begin TF with [employer] renal support, Initiate @ 15 ml/hr and advance by 10 ml Q 24 hr as tolerated to goal @ 35 ml/hr.  Do not start or advance unless K+ >3...  parenteral nutrition - ADULT compounded formula CYCLE  parenteral nutrition - ADULT compounded formula CYCLE    DVT Prophylaxis: resume subQ heparin  Dong Catheter: Not present  Fluids: TPN and PO  Lines: PRESENT      PICC 03/04/24 Double Lumen Right Basilic TPN. PICC okay to use.-Site Assessment: WDL  Hemodialysis Vascular Access Arteriovenous graft Superior Arm-Site Assessment: WDL      Cardiac Monitoring: None  Code Status: Full Code      Clinically Significant Risk Factors              # Hypoalbuminemia: Lowest albumin = 2.6 g/dL at 2/18/2024  5:13 AM, will monitor as appropriate            # Cachexia: Estimated body mass index is  "17.53 kg/m  as calculated from the following:    Height as of this encounter: 1.57 m (5' 1.81\").    Weight as of this encounter: 43.2 kg (95 lb 3.8 oz).   # Severe Malnutrition: based on nutrition assessment      # Financial/Environmental Concerns: none         Disposition Plan       Patient seen and discussed with Dr. Frantz Diaz, PGY4  Internal Medicine-Pediatrics  Pager: [phone]      [address]  Securely message with [employer] (more info)  Text page via [facility] Paging/Directory   See signed in provider for up to date coverage information  ______________________________________________________________________    Interval History   No acute events overnight. Wore BiPAP 45mins overnight. Overall feeling well. Diarrhea improving. Care conference today. Answered pt and family Qs.     Physical Exam   Vital Signs: Temp: 99  F (37.2  C) Temp src: Oral BP: 133/65 Pulse: 90   Resp: 18 SpO2: 94 % O2 Device: None (Room air) Oxygen Delivery: 1 LPM  Weight: 95 lbs 3.82 oz  General: thin, laying in bed comfortably, no acute distress this morning. Very pleasant  HEENT: bruising on the right face around right orbit with hematoma and right neck, improved  Pulm/Resp: breathing comfortably on RA, CTAB. No cough  CV: normal rate, regular rhythm. No LE edema  Neuro: alert and following commands, answering questions clearly and easily, moving all extremities.    Medical Decision Making       Please see A&P for additional details of medical decision making.      Data     I have personally reviewed the following data over the past 24 hrs:    N/A  \   N/A   / N/A     137 100 47.2 (H) /  110 (H)   4.0 26 2.72 (H) \     ALT: 8 AST: 17 AP: 117 TBILI: 0.3   ALB: 3.6 TOT PROTEIN: 5.9 (L) LIPASE: N/A       Imaging results reviewed over the past 24 hrs:   No results found for this or any previous visit (from the past 24 hour(s)).        "

## 2024-03-15 NOTE — PLAN OF CARE
"BP (!) 148/74 (BP Location: Right arm)   Pulse 96   Temp 98.5  F (36.9  C) (Oral)   Resp 18   Ht 1.57 m (5' 1.81\")   Wt 43.2 kg (95 lb 3.8 oz)   SpO2 99%   BMI 17.53 kg/m  . PICC is C/D/I and saline locked. Patient is A & O x 4. No c/o pain or nausea or emesis this shift. Patient receives cycle TPN and has GJ with tube feeds 15 mL/hr with Q4 30 mL free water flush, next increase rate is 1700. Patient is eating and drinking fair. Patient had 100% of her breakfast and lunch. Patient is up with SBA and uses the commode at the bedside. Continue with plan of care and notify MD for status changes.     Problem: Adult Inpatient Plan of Care  Goal: Plan of Care Review  Description: The Plan of Care Review/Shift note should be completed every shift.  The Outcome Evaluation is a brief statement about your assessment that the patient is improving, declining, or no change.  This information will be displayed automatically on your shift  note.  Outcome: Progressing  Flowsheets (Taken 3/15/2024 1414)  Plan of Care Reviewed With: patient  Overall Patient Progress: no change     Problem: Adult Inpatient Plan of Care  Goal: Absence of Hospital-Acquired Illness or Injury  Intervention: Identify and Manage Fall Risk  Recent Flowsheet Documentation  Taken 3/15/2024 0846 by Shagufta Cardoza RN  Safety Promotion/Fall Prevention:   activity supervised   clutter free environment maintained   increased rounding and observation   increase visualization of patient   lighting adjusted   mobility aid in reach   nonskid shoes/slippers when out of bed   patient and family education   room near nurse's station     Problem: Adult Inpatient Plan of Care  Goal: Absence of Hospital-Acquired Illness or Injury  Intervention: Prevent Skin Injury  Recent Flowsheet Documentation  Taken 3/15/2024 1200 by Shagufta Cardoza RN  Body Position: other (see comments)  Taken 3/15/2024 1000 by Shagufta Cardoza RN  Body Position: other (see " comments)  Taken 3/15/2024 0846 by Shagufta Cardoza RN  Body Position:   position changed independently   heels elevated   foot of bed elevated   legs elevated     Problem: Adult Inpatient Plan of Care  Goal: Optimal Comfort and Wellbeing  Intervention: Provide Person-Centered Care  Recent Flowsheet Documentation  Taken 3/15/2024 0846 by Shagufta Cardoza RN  Trust Relationship/Rapport:   care explained   choices provided   emotional support provided   empathic listening provided   questions answered   reassurance provided   questions encouraged   thoughts/feelings acknowledged     Problem: Fall Injury Risk  Goal: Absence of Fall and Fall-Related Injury  Intervention: Promote Injury-Free Environment  Recent Flowsheet Documentation  Taken 3/15/2024 0846 by Shagufta Cardoza RN  Safety Promotion/Fall Prevention:   activity supervised   clutter free environment maintained   increased rounding and observation   increase visualization of patient   lighting adjusted   mobility aid in reach   nonskid shoes/slippers when out of bed   patient and family education   room near nurse's station     Problem: Diarrhea  Goal: Effective Diarrhea Management  Intervention: Manage Diarrhea  Recent Flowsheet Documentation  Taken 3/15/2024 0846 by Shagufta Cardoza RN  Fluid/Electrolyte Management: fluids provided  Isolation Precautions: enteric precautions maintained  Medication Review/Management: medications reviewed  Perineal Care: perineal hygiene encouraged     Problem: Pain Acute  Goal: Optimal Pain Control and Function  Intervention: Prevent or Manage Pain  Recent Flowsheet Documentation  Taken 3/15/2024 0846 by Shagufta Cardoza RN  Sensory Stimulation Regulation:   lighting decreased   care clustered  Sleep/Rest Enhancement: relaxation techniques promoted  Bowel Elimination Promotion:   adequate fluid intake promoted   ambulation promoted   commode/bedpan at bedside  Complementary Therapy: other  (see comments)  Medication Review/Management: medications reviewed     Problem: Hemodialysis  Goal: Safe, Effective Therapy Delivery  Intervention: Optimize Device Care and Function  Recent Flowsheet Documentation  Taken 3/15/2024 3375 by Shagufta Cardoza RN  Medication Review/Management: medications reviewed         Goal Outcome Evaluation:      Plan of Care Reviewed With: patient    Overall Patient Progress: no changeOverall Patient Progress: no change

## 2024-03-15 NOTE — PROGRESS NOTES
Calorie Count  Intake recorded for: 3/14  Total Kcals: 299 Total Protein: 13g  Kcals from Hospital Food: 299   Protein: 13g  Kcals from Outside Food (average):0 Protein: 0g  # Meals Ordered from Kitchen: 3 meals  # Meals Recorded: 1 - 100% potato chips, 50% turkey and cheddar cheese sandwich  # Supplements Recorded: 0

## 2024-03-15 NOTE — PROGRESS NOTES
Lung Transplant Consult Follow Up Note   March 15, 2024                  Assessment and Plan:   Sofie Rodriguez is a 63 year old female with h/o COPD s/p BSLT (2022) with course complicated by post-operative hemidiaphragm palsy, recurrent PNAs, positive DSA, EBV viremia, hypogammaglobulinemia, severe gastroparesis s/p G/J tube placement (7/27/22) and pyloric botox (1/25/23), GI bleed 2/2 pyloric ulcer, hemobilia s/p ERCP and MRCP, chronic diarrhea, recurrent C diff colitis, ESRD on iHD, recurrent falls with injuries (recent right hip fx s/p ORIF 12/2023), deconditioning, and FTT.  Admitted 2/10 for initiation of TPN/lipids.  Transferred to ICU 2/17 for emergent intubation for hypoxic and hypercapneic respiratory failure with severe respiratory acidosis and encephalopathy.  iHD increased, infectious workup unremarkable. Extubated 2/19. Persistent and progressive hypercapnia with severe acidosis, returned to ICU 2/28-2/29 d/t tenuous respiratory status. Improvement noted when able to tolerate NIPPV. Several medication changes made 3/11 to evaluate effects on diarrhea. C diff positive 3/13.      Today's recommendations:  - Taper off TPN (defer to primary team and dietitians), trial low rate TF   - Cyclosporine increased to 200 mg bid, repeat level 3/18/24, attempted to give combined full dose this morning but morning dose was missed   - would repeat metabolic cart once on stable enteral and PO feeds and off TPN for several days; elevated RQ suggests possible overfeeding   - I will bring a nasal mask for AVAPs on Monday 3/18, agree with trialing some anxiolysis at bedtime to tolerate current mask if possible  - Neurology consult given the progressively worsening FVC and NIF  - Consider vitamin deficiencies: copper, zinc, B12, folate, Thiamine although likely replete via TPN would check given worsening peripheral neuropathy  - Daily AM VBG  - Repeat CMV, Prospera, and EBV ordered 3/18  - Repeat DSA 4/6  - encourage  ongoing Palliative service support       Acute on chronic hypoxic/hypercapneic respiratory failure:  S/p bilateral sequential lung transplant for COPD:   Hypoventilation, Suspected CARLEE: CT PTA (2/7) with decreased MARLON opacities but new tree in bud RLL opacities.  PFTs unchanged in clinic (but ATS criteria not met), remain significantly below her baseline.  Baseline hypoxia with 2L NC overnight.  Respiratory decompensation with encephalopathy and severe respiratory acidosis on 2/17.  S/p intubation 2/17-2/19.  Repeat CT (2/17) with new patchy consolidative and nodular opacities, GGO primarily in the bases and intralobular septal thickening (concerning for pulmonary edema given recent addition of TPN nutrition, new infection.  Bronch with lavage of RML (2/18) with very friable tissue, cutlures only with C. glabrata.  S/p empiric Zosyn (2/17-2/24) and micafungin (2/18-2/21).  Severe respiratory acidosis with hypercapnia persisting with slight improvement with NIPPV treatment.  Persistent respiratory failure  also complicated by  hypoventilation, and deconditioning d/t malnutrition.     Intermittent tolerance of NIPPV, some improvement with transition to AVAPS. Neurology consulted 2/27. Transferred to ICU 2/28 given tenuous respiratory status and potential need for reintubation.. Improvement noted with continuous NIPPV with minimal interruptions (sodium bicarb also stopped, likely partially contributing), trial off NIPPV during the day started 2/29. MRI brain (3/1) with moderate leukoaraiosis, no acute intracranial pathology.  SNIFF testing performed 3/8- Movement of bilateral hemidiaphragm with respiration without paradoxical movement. Currently wearing AVAPS for several hours per night PCO2 began decreasing with use and with decrease in feeds. Metabolic CART suggesting overfeeding on TPN. However, NIF and FVC continue to downtrend over 3/5-3/15, prompting concern for potential neuromuscular cause.      Overall, her  PCO2 retention is likely multifactorial with a component being from overfeeding, inability to compensate due to renal failure and bicarb loss with diarrhea, some portion of hypoventilation with declining FVC concerning for neuromuscular etiology and inability to consistently tolerate AVAPs at night due to claustrophobia.   - VBG improving slightly when wearing AVAPS and with deescalation of TPN  - Neuro reassessment in setting of worsening peripheral neuropathy and declining NIF and FVC     Date FVC NIF   3/5 830 -40   3/14 500 -25   3/15 400 -33     - Check daily AM VBG  - AVAPS at night and with naps, may need better titration if this is true NM weakness  - Delivering nasal mask on 3/18 (Dr. Franks will provide)  - Xopenex BID (2/27)  - Defer ABX at this time  - Sleep clinic eval indicated as OP  - Palliative following     Immunosuppression:  ImmuKnow low at 108 on 1/10.  AZA previously stopped 5/2023 d/t low ImmuKnow assay and EBV viremia.  Repeat ImmuKnow (2/27) low at 88.  Change from [place] to CSA 3/11.  - CSA goal 140-170.  Start CSA emulsion 125mg BID and check a level Thursday, 3/14 (ordered)  - Prednisone 5 mg qAM / 2.5 mg qPM     Prophylaxis:   - Discontinue dapsone (3/11). Start bactrim single strength qM/W/F.  If diarrhea persists, change to pentamidine.  - CMV ppx not currently indicated, D+/R+, CMV negative 2/19 (BAL very mildly positive 2/18, likely not clinically significant), repeat CMV ordered 3/18     Positive DSA: PFTs and pulmonary symptoms have remained stable as OP, so AMR treatment deferred given frailty.  DSA DQB2 mfi 6966 on 2/7 and stable to decreased on 3/6 5667.  Most recent cell-free DNA Prospera (2/18) mildly elevated at 1.04 (concerning for possible rejection), which was increased from prior level of 0.12 (1/10).  IST increase deferred at that time per Dr. Gong.  S/p IVIG (2/27) for DSA (IgG WNL).  - Repeat DSA 4/6  - Repeat Prospera ordered 3/18      EBV viremia: CT CAP (2/7) without  lymphadenopathy.  Most recent level (2/18) improved to 28k from 96k (2/7)  - Repeat EBV ordered 3/18     Other relevant problems being managed by the primary team:      FTT:  Severe protein calorie malnutrition:  Gastroparesis s/p PEG/J, botox, and G-POEM:  SB hypomotility:  Pyloric ulcer:  Chronic nausea and osmotic diarrhea:  SIBO s/p rifaximin:   Recurrent C diff colitis: Chronic osmotic and infectious diarrhea since transplant with recurrent episodes of C diff.  Notable weight loss (40# in a year) d/t diarrhea, GI dysmotility, and intolerance of enteral feeds (PEG/J tube in place), most recently on elemental formula.  Extensive OP eval and f/u with GI.  S/p port placement for TPN and lipids. Despite 5 weeks of inpatient TPN she demonstrated no true improvement in nutrition, strength or diarrhea and TPN will not be a feasible option for home. Changed immunosuppressants, treating C diff and retrialing enteral nutrition and as tolerated PO intake.   - Retrialing low dose enteral nutrition   - Encourage PO nutrition and oral spupplements.  - C diff positive yet again although do not think this is the sole cause of her recurrent diarrhea, on fidaxomicin as of 3/13   -- Consider colonoscopy if no improvements with above     Peripheral Neuropathy: As of 3/15 she notes an initiation and increase in pins and needle sensation in her feet which seems to be worsening. It could be immunosuppression related, no hx of diabetes, but given the issues with nutrition would also be concerned for vitamin deficiency.   - Consider checking:copper, zinc, B12, folate, thiamine     ESRD: CT scan (with declining respiratory status) with volume overload secondary to TPN volume, iHD increased from PTA TThSa schedule with unsustained improvement.  Management per nephrology, dialysis via [facility].    - iHD 4x/week schedule (MTTSa) while on TPN but will likely be three times per week once off TPN, per Nephrology     Goals of Care: Care  conference held with palliative and primary team on 3/15. Ultimately, updated family (daughters) and Sofie regarding her overall status. She feels hopeful that these new changes will be helpful. We did discuss that at any point if she decides that what we're doing is not in alignment with her quality of life or goals of care that we can transition to a comfort based approach but for now she wants to continue full restorative cares.      We appreciate the excellent care provided by the [place] [id number] team.  Recommendations communicated via this note and in person.  Will continue to follow along closely, please do not hesitate to call with any questions or concerns.    Claribel Franks MD  Pulmonary Transplant/CF Attending  Pager: [phone]  Vocera Web Console                  Interval History:   Wore bipap for 45 minutes last night. She overall does not tolerate it well. She denies any nausea, heartburn, thinks her loose stools are starting to improve a little. She denies any dyspnea or shortness of breath. She denies any headache or confusion or brain fog this morning. She reports onset of peripheral neuropathy a few weeks ago but notices it has been worsening in the last week and she had not mentioned this to her providers. She thinks that physically she's getting stronger.            Review of Systems:   Per HPI          Medications:      calcium carbonate-vitamin D  1 tablet Per J Tube TID w/meals    cyanocobalamin  500 mcg Per Feeding Tube Daily    cycloSPORINE modified  125 mg Per G Tube BID IS    fidaxomicin  200 mg Oral BID    heparin ANTICOAGULANT  5,000 Units Subcutaneous Q12H    levalbuterol  1.25 mg Nebulization 2 times daily    levothyroxine  25 mcg Oral QAM AC    lidocaine  1 patch Transdermal Q24h    liothyronine  2.5 mcg Oral BID    lipids 4 oil  250 mL Intravenous Once per day on Monday Tuesday Wednesday Thursday Friday    [Held by provider] metoprolol  25 mg Per J Tube BID    pantoprazole  40 mg  Per J Tube BID    predniSONE  5 mg Per J Tube QAM    And    predniSONE  2.5 mg Per J Tube QPM    [Held by provider] sevelamer carbonate (RENVELA)  0.8 g Oral BID    sodium chloride (PF)  10 mL Intracatheter Q8H    sulfamethoxazole-trimethoprim  1 tablet Oral Q Mon Wed Fri AM     acetaminophen, albumin human, albuterol, calcium carbonate, artificial tears, dextrose, glucose **OR** dextrose **OR** glucagon, diphenhydrAMINE **OR** diphenhydrAMINE, hydrOXYzine HCl, lidocaine 4%, lidocaine (buffered or not buffered), lidocaine (buffered or not buffered), lidocaine-prilocaine, [Held by provider] loperamide, naloxone **OR** naloxone **OR** naloxone **OR** naloxone, - MEDICATION INSTRUCTIONS -, ondansetron **OR** ondansetron, - MEDICATION INSTRUCTIONS -, senna-docusate **OR** senna-docusate, sodium chloride, sodium chloride (PF), sodium chloride (PF), sodium chloride 0.9%         Physical Exam:   Temp:  [98  F (36.7  C)-98.6  F (37  C)] 98.5  F (36.9  C)  Pulse:  [] 96  Resp:  [18-21] 18  BP: (127-148)/(61-79) 148/74  SpO2:  [92 %-100 %] 99 %    Intake/Output Summary (Last 24 hours) at 3/15/2024 1712  Last data filed at 3/15/2024 1500  Gross per 24 hour   Intake 1922.4 ml   Output --   Net 1922.4 ml     Gen: AA&Ox3, no acute distress, on 1L O2  HEENT:AT/ NC, PERRL b/l, EOM grossly intact, mucous membranes pink, moist without plaque or exudate  BACK: no CVA tenderness, no midline bony tenderness  PULM/THORAX: Clear to auscultation bilaterally, no rales/rhonchi/wheezes  CV:RRR, S1 and S2 appreciated, no extra heart sounds, murmurs or rub auscultated. No JVD  ABD: thin, soft, nontender, nondistended. Normoactive bowel sounds x 4, no HSM appreciated.  EXT: No edema, +clubbing, nocyanosis. No asymmetrical edema or tenderness to palpation in calves bilaterally.  MSK: muscle wasting present, no supraclavicular fat pad             Data:   All laboratory and imaging data reviewed.    Results for orders placed or performed  during the hospital encounter of 02/10/24 (from the past 24 hour(s))   Comprehensive metabolic panel   Result Value Ref Range    Sodium 137 135 - 145 mmol/L    Potassium 4.0 3.4 - 5.3 mmol/L    Carbon Dioxide (CO2) 26 22 - 29 mmol/L    Anion Gap 11 7 - 15 mmol/L    Urea Nitrogen 47.2 (H) 8.0 - 23.0 mg/dL    Creatinine 2.72 (H) 0.51 - 0.95 mg/dL    GFR Estimate 19 (L) >60 mL/min/1.73m2    Calcium 8.7 (L) 8.8 - 10.2 mg/dL    Chloride 100 98 - 107 mmol/L    Glucose 110 (H) 70 - 99 mg/dL    Alkaline Phosphatase 117 40 - 150 U/L    AST 17 0 - 45 U/L    ALT 8 0 - 50 U/L    Protein Total 5.9 (L) 6.4 - 8.3 g/dL    Albumin 3.6 3.5 - 5.2 g/dL    Bilirubin Total 0.3 <=1.2 mg/dL   Blood gas venous   Result Value Ref Range    pH Venous 7.20 (L) 7.32 - 7.43    pCO2 Venous 78 (HH) 40 - 50 mm Hg    pO2 Venous 58 (H) 25 - 47 mm Hg    Bicarbonate Venous 30 (H) 21 - 28 mmol/L    Base Excess/Deficit Venous 0.2 -3.0 - 3.0 mmol/L    FIO2 30     Oxyhemoglobin Venous 82 (H) 70 - 75 %    O2 Sat, Venous 83.3 (H) 70.0 - 75.0 %    Narrative    In healthy individuals, oxyhemoglobin (O2Hb) and oxygen saturation (SO2) are approximately equal. In the presence of dyshemoglobins, oxyhemoglobin can be considerably lower than oxygen saturation.   Magnesium   Result Value Ref Range    Magnesium 1.7 1.7 - 2.3 mg/dL   Phosphorus   Result Value Ref Range    Phosphorus 3.5 2.5 - 4.5 mg/dL   Extra Tube    Narrative    The following orders were created for panel order Extra Tube.  Procedure                               Abnormality         Status                     ---------                               -----------         ------                     Extra Purple Top Tube[[id number]]                            Final result                 Please view results for these tests on the individual orders.   Extra Purple Top Tube   Result Value Ref Range    Hold Specimen JIC      *Note: Due to a large number of results and/or encounters for the requested time  period, some results have not been displayed. A complete set of results can be found in Results Review.

## 2024-03-15 NOTE — PLAN OF CARE
Shift: [id number]        Team: Maryeimi Yanez  Neuro: A&Ox4  Resp: >95% on 1L NC  Cardiac: No tele, afebrile, palpable pulses, vital signs stable   GI/: Bowel sounds audible, no bowel movements this shift   Skin: Old cvc site  PRN: Hydralazine given for anxiety   Activity: SBA  LDA's: Double lumen PICC cycled TPN 58 mL/hr, GJ with tube feeds 15 mL/hr with Q4 30 mL free water flush  Diet: Renal with sudha counts       Plan: Continue plan of care and notify team with changes.

## 2024-03-16 LAB
ALBUMIN SERPL BCG-MCNC: 3.4 G/DL (ref 3.5–5.2)
ALP SERPL-CCNC: 121 U/L (ref 40–150)
ALT SERPL W P-5'-P-CCNC: <5 U/L (ref 0–50)
ANION GAP SERPL CALCULATED.3IONS-SCNC: 11 MMOL/L (ref 7–15)
ANION GAP SERPL CALCULATED.3IONS-SCNC: 12 MMOL/L (ref 7–15)
AST SERPL W P-5'-P-CCNC: 17 U/L (ref 0–45)
BASE EXCESS BLDV CALC-SCNC: -2.6 MMOL/L (ref -3–3)
BASE EXCESS BLDV CALC-SCNC: 1.7 MMOL/L (ref -3–3)
BASOPHILS # BLD AUTO: ABNORMAL 10*3/UL
BASOPHILS # BLD MANUAL: 0 10E3/UL (ref 0–0.2)
BASOPHILS NFR BLD AUTO: ABNORMAL %
BASOPHILS NFR BLD MANUAL: 0 %
BILIRUB SERPL-MCNC: 0.3 MG/DL
BUN SERPL-MCNC: 38.9 MG/DL (ref 8–23)
BUN SERPL-MCNC: 79.4 MG/DL (ref 8–23)
CALCIUM SERPL-MCNC: 8.6 MG/DL (ref 8.8–10.2)
CALCIUM SERPL-MCNC: 8.9 MG/DL (ref 8.8–10.2)
CHLORIDE SERPL-SCNC: 100 MMOL/L (ref 98–107)
CHLORIDE SERPL-SCNC: 101 MMOL/L (ref 98–107)
CREAT SERPL-MCNC: 2.52 MG/DL (ref 0.51–0.95)
CREAT SERPL-MCNC: 3.9 MG/DL (ref 0.51–0.95)
DEPRECATED HCO3 PLAS-SCNC: 24 MMOL/L (ref 22–29)
DEPRECATED HCO3 PLAS-SCNC: 26 MMOL/L (ref 22–29)
EGFRCR SERPLBLD CKD-EPI 2021: 12 ML/MIN/1.73M2
EGFRCR SERPLBLD CKD-EPI 2021: 21 ML/MIN/1.73M2
EOSINOPHIL # BLD AUTO: ABNORMAL 10*3/UL
EOSINOPHIL # BLD MANUAL: 0.3 10E3/UL (ref 0–0.7)
EOSINOPHIL NFR BLD AUTO: ABNORMAL %
EOSINOPHIL NFR BLD MANUAL: 4 %
ERYTHROCYTE [DISTWIDTH] IN BLOOD BY AUTOMATED COUNT: 19 % (ref 10–15)
GLUCOSE BLDC GLUCOMTR-MCNC: 115 MG/DL (ref 70–99)
GLUCOSE SERPL-MCNC: 104 MG/DL (ref 70–99)
GLUCOSE SERPL-MCNC: 114 MG/DL (ref 70–99)
HCO3 BLDV-SCNC: 28 MMOL/L (ref 21–28)
HCO3 BLDV-SCNC: 30 MMOL/L (ref 21–28)
HCT VFR BLD AUTO: 34.8 % (ref 35–47)
HGB BLD-MCNC: 10 G/DL (ref 11.7–15.7)
IMM GRANULOCYTES # BLD: ABNORMAL 10*3/UL
IMM GRANULOCYTES NFR BLD: ABNORMAL %
LYMPHOCYTES # BLD AUTO: ABNORMAL 10*3/UL
LYMPHOCYTES # BLD MANUAL: 0.8 10E3/UL (ref 0.8–5.3)
LYMPHOCYTES NFR BLD AUTO: ABNORMAL %
LYMPHOCYTES NFR BLD MANUAL: 11 %
MAGNESIUM SERPL-MCNC: 1.6 MG/DL (ref 1.7–2.3)
MAGNESIUM SERPL-MCNC: 1.9 MG/DL (ref 1.7–2.3)
MCH RBC QN AUTO: 35.7 PG (ref 26.5–33)
MCHC RBC AUTO-ENTMCNC: 28.7 G/DL (ref 31.5–36.5)
MCV RBC AUTO: 124 FL (ref 78–100)
MONOCYTES # BLD AUTO: ABNORMAL 10*3/UL
MONOCYTES # BLD MANUAL: 0.4 10E3/UL (ref 0–1.3)
MONOCYTES NFR BLD AUTO: ABNORMAL %
MONOCYTES NFR BLD MANUAL: 5 %
MYELOCYTES # BLD MANUAL: 0.2 10E3/UL
MYELOCYTES NFR BLD MANUAL: 3 %
NEUTROPHILS # BLD AUTO: ABNORMAL 10*3/UL
NEUTROPHILS # BLD MANUAL: 5.9 10E3/UL (ref 1.6–8.3)
NEUTROPHILS NFR BLD AUTO: ABNORMAL %
NEUTROPHILS NFR BLD MANUAL: 77 %
NRBC # BLD AUTO: 0 10E3/UL
NRBC BLD AUTO-RTO: 0 /100
O2/TOTAL GAS SETTING VFR VENT: 30 %
O2/TOTAL GAS SETTING VFR VENT: 30 %
OXYHGB MFR BLDV: 71 % (ref 70–75)
OXYHGB MFR BLDV: 86 % (ref 70–75)
PCO2 BLDV: 66 MM HG (ref 40–50)
PCO2 BLDV: 83 MM HG (ref 40–50)
PH BLDV: 7.14 [PH] (ref 7.32–7.43)
PH BLDV: 7.27 [PH] (ref 7.32–7.43)
PHOSPHATE SERPL-MCNC: 5.4 MG/DL (ref 2.5–4.5)
PLAT MORPH BLD: ABNORMAL
PLATELET # BLD AUTO: 203 10E3/UL (ref 150–450)
PO2 BLDV: 40 MM HG (ref 25–47)
PO2 BLDV: 69 MM HG (ref 25–47)
POLYCHROMASIA BLD QL SMEAR: SLIGHT
POTASSIUM SERPL-SCNC: 4.2 MMOL/L (ref 3.4–5.3)
POTASSIUM SERPL-SCNC: 4.5 MMOL/L (ref 3.4–5.3)
PROT SERPL-MCNC: 5.9 G/DL (ref 6.4–8.3)
RBC # BLD AUTO: 2.8 10E6/UL (ref 3.8–5.2)
RBC MORPH BLD: ABNORMAL
SAO2 % BLDV: 72.2 % (ref 70–75)
SAO2 % BLDV: 88.2 % (ref 70–75)
SODIUM SERPL-SCNC: 137 MMOL/L (ref 135–145)
SODIUM SERPL-SCNC: 137 MMOL/L (ref 135–145)
WBC # BLD AUTO: 7.7 10E3/UL (ref 4–11)

## 2024-03-16 PROCEDURE — 99233 SBSQ HOSP IP/OBS HIGH 50: CPT | Mod: 24 | Performed by: INTERNAL MEDICINE

## 2024-03-16 PROCEDURE — 83735 ASSAY OF MAGNESIUM: CPT

## 2024-03-16 PROCEDURE — 85007 BL SMEAR W/DIFF WBC COUNT: CPT | Performed by: STUDENT IN AN ORGANIZED HEALTH CARE EDUCATION/TRAINING PROGRAM

## 2024-03-16 PROCEDURE — 85027 COMPLETE CBC AUTOMATED: CPT | Performed by: STUDENT IN AN ORGANIZED HEALTH CARE EDUCATION/TRAINING PROGRAM

## 2024-03-16 PROCEDURE — 250N000012 HC RX MED GY IP 250 OP 636 PS 637

## 2024-03-16 PROCEDURE — 250N000013 HC RX MED GY IP 250 OP 250 PS 637

## 2024-03-16 PROCEDURE — 250N000009 HC RX 250

## 2024-03-16 PROCEDURE — 250N000009 HC RX 250: Performed by: STUDENT IN AN ORGANIZED HEALTH CARE EDUCATION/TRAINING PROGRAM

## 2024-03-16 PROCEDURE — 84207 ASSAY OF VITAMIN B-6: CPT | Performed by: STUDENT IN AN ORGANIZED HEALTH CARE EDUCATION/TRAINING PROGRAM

## 2024-03-16 PROCEDURE — 84425 ASSAY OF VITAMIN B-1: CPT | Performed by: STUDENT IN AN ORGANIZED HEALTH CARE EDUCATION/TRAINING PROGRAM

## 2024-03-16 PROCEDURE — 36415 COLL VENOUS BLD VENIPUNCTURE: CPT

## 2024-03-16 PROCEDURE — 93010 ELECTROCARDIOGRAM REPORT: CPT | Mod: 76 | Performed by: INTERNAL MEDICINE

## 2024-03-16 PROCEDURE — 634N000001 HC RX 634: Mod: JZ | Performed by: STUDENT IN AN ORGANIZED HEALTH CARE EDUCATION/TRAINING PROGRAM

## 2024-03-16 PROCEDURE — 82805 BLOOD GASES W/O2 SATURATION: CPT

## 2024-03-16 PROCEDURE — 258N000003 HC RX IP 258 OP 636: Performed by: STUDENT IN AN ORGANIZED HEALTH CARE EDUCATION/TRAINING PROGRAM

## 2024-03-16 PROCEDURE — 82525 ASSAY OF COPPER: CPT | Performed by: STUDENT IN AN ORGANIZED HEALTH CARE EDUCATION/TRAINING PROGRAM

## 2024-03-16 PROCEDURE — 250N000012 HC RX MED GY IP 250 OP 636 PS 637: Performed by: INTERNAL MEDICINE

## 2024-03-16 PROCEDURE — 250N000011 HC RX IP 250 OP 636: Performed by: STUDENT IN AN ORGANIZED HEALTH CARE EDUCATION/TRAINING PROGRAM

## 2024-03-16 PROCEDURE — 250N000011 HC RX IP 250 OP 636

## 2024-03-16 PROCEDURE — 84591 ASSAY OF NOS VITAMIN: CPT | Performed by: STUDENT IN AN ORGANIZED HEALTH CARE EDUCATION/TRAINING PROGRAM

## 2024-03-16 PROCEDURE — 999N000157 HC STATISTIC RCP TIME EA 10 MIN

## 2024-03-16 PROCEDURE — 93005 ELECTROCARDIOGRAM TRACING: CPT

## 2024-03-16 PROCEDURE — 84100 ASSAY OF PHOSPHORUS: CPT | Performed by: STUDENT IN AN ORGANIZED HEALTH CARE EDUCATION/TRAINING PROGRAM

## 2024-03-16 PROCEDURE — 90937 HEMODIALYSIS REPEATED EVAL: CPT

## 2024-03-16 PROCEDURE — 83735 ASSAY OF MAGNESIUM: CPT | Performed by: STUDENT IN AN ORGANIZED HEALTH CARE EDUCATION/TRAINING PROGRAM

## 2024-03-16 PROCEDURE — 36592 COLLECT BLOOD FROM PICC: CPT

## 2024-03-16 PROCEDURE — 80053 COMPREHEN METABOLIC PANEL: CPT

## 2024-03-16 PROCEDURE — 120N000002 HC R&B MED SURG/OB UMMC

## 2024-03-16 PROCEDURE — 94660 CPAP INITIATION&MGMT: CPT

## 2024-03-16 PROCEDURE — 99222 1ST HOSP IP/OBS MODERATE 55: CPT | Mod: 24 | Performed by: STUDENT IN AN ORGANIZED HEALTH CARE EDUCATION/TRAINING PROGRAM

## 2024-03-16 PROCEDURE — 36592 COLLECT BLOOD FROM PICC: CPT | Performed by: STUDENT IN AN ORGANIZED HEALTH CARE EDUCATION/TRAINING PROGRAM

## 2024-03-16 PROCEDURE — 99232 SBSQ HOSP IP/OBS MODERATE 35: CPT | Mod: GC | Performed by: STUDENT IN AN ORGANIZED HEALTH CARE EDUCATION/TRAINING PROGRAM

## 2024-03-16 PROCEDURE — 250N000013 HC RX MED GY IP 250 OP 250 PS 637: Performed by: STUDENT IN AN ORGANIZED HEALTH CARE EDUCATION/TRAINING PROGRAM

## 2024-03-16 RX ORDER — HEPARIN SODIUM,PORCINE 10 UNIT/ML
5-20 VIAL (ML) INTRAVENOUS EVERY 24 HOURS
Status: DISCONTINUED | OUTPATIENT
Start: 2024-03-16 | End: 2024-04-15 | Stop reason: HOSPADM

## 2024-03-16 RX ORDER — MAGNESIUM SULFATE HEPTAHYDRATE 40 MG/ML
2 INJECTION, SOLUTION INTRAVENOUS ONCE
Status: COMPLETED | OUTPATIENT
Start: 2024-03-16 | End: 2024-03-16

## 2024-03-16 RX ORDER — HEPARIN SODIUM,PORCINE 10 UNIT/ML
5-20 VIAL (ML) INTRAVENOUS
Status: DISCONTINUED | OUTPATIENT
Start: 2024-03-16 | End: 2024-04-15 | Stop reason: HOSPADM

## 2024-03-16 RX ORDER — METOPROLOL TARTRATE 1 MG/ML
5 INJECTION, SOLUTION INTRAVENOUS ONCE
Status: COMPLETED | OUTPATIENT
Start: 2024-03-16 | End: 2024-03-16

## 2024-03-16 RX ADMIN — METOPROLOL TARTRATE 5 MG: 1 INJECTION, SOLUTION INTRAVENOUS at 20:42

## 2024-03-16 RX ADMIN — LIDOCAINE: 40 CREAM TOPICAL at 06:43

## 2024-03-16 RX ADMIN — OLANZAPINE 5 MG: 5 TABLET, ORALLY DISINTEGRATING ORAL at 23:22

## 2024-03-16 RX ADMIN — CYCLOSPORINE 200 MG: 100 SOLUTION ORAL at 19:29

## 2024-03-16 RX ADMIN — MAGNESIUM SULFATE IN WATER 2 G: 40 INJECTION, SOLUTION INTRAVENOUS at 22:47

## 2024-03-16 RX ADMIN — Medication 12.5 MG: at 20:42

## 2024-03-16 RX ADMIN — FIDAXOMICIN 200 MG: 200 TABLET, FILM COATED ORAL at 19:30

## 2024-03-16 RX ADMIN — HEPARIN SODIUM 5000 UNITS: 5000 INJECTION, SOLUTION INTRAVENOUS; SUBCUTANEOUS at 19:37

## 2024-03-16 RX ADMIN — Medication: at 08:06

## 2024-03-16 RX ADMIN — PREDNISONE 2.5 MG: 2.5 TABLET ORAL at 19:30

## 2024-03-16 RX ADMIN — CALCIUM CARBONATE 600 MG (1,500 MG)-VITAMIN D3 400 UNIT TABLET 1 TABLET: at 19:30

## 2024-03-16 RX ADMIN — CYANOCOBALAMIN TAB 500 MCG 500 MCG: 500 TAB at 13:01

## 2024-03-16 RX ADMIN — Medication 2.5 MCG: at 12:59

## 2024-03-16 RX ADMIN — Medication 40 MG: at 20:56

## 2024-03-16 RX ADMIN — LIDOCAINE 4% 1 PATCH: 40 PATCH TOPICAL at 19:36

## 2024-03-16 RX ADMIN — MAGNESIUM SULFATE HEPTAHYDRATE: 500 INJECTION, SOLUTION INTRAMUSCULAR; INTRAVENOUS at 20:52

## 2024-03-16 RX ADMIN — FIDAXOMICIN 200 MG: 200 TABLET, FILM COATED ORAL at 12:59

## 2024-03-16 RX ADMIN — LEVOTHYROXINE SODIUM 25 MCG: 0.03 TABLET ORAL at 06:43

## 2024-03-16 RX ADMIN — CYCLOSPORINE 200 MG: 100 SOLUTION ORAL at 12:59

## 2024-03-16 RX ADMIN — Medication 2.5 MCG: at 19:30

## 2024-03-16 RX ADMIN — SODIUM CHLORIDE 250 ML: 9 INJECTION, SOLUTION INTRAVENOUS at 08:05

## 2024-03-16 RX ADMIN — HEPARIN SODIUM 5000 UNITS: 5000 INJECTION, SOLUTION INTRAVENOUS; SUBCUTANEOUS at 05:51

## 2024-03-16 RX ADMIN — SODIUM CHLORIDE 300 ML: 9 INJECTION, SOLUTION INTRAVENOUS at 08:06

## 2024-03-16 RX ADMIN — CALCIUM CARBONATE 600 MG (1,500 MG)-VITAMIN D3 400 UNIT TABLET 1 TABLET: at 13:01

## 2024-03-16 RX ADMIN — Medication 10 ML: at 15:00

## 2024-03-16 RX ADMIN — EPOETIN ALFA-EPBX 8000 UNITS: 10000 INJECTION, SOLUTION INTRAVENOUS; SUBCUTANEOUS at 10:15

## 2024-03-16 RX ADMIN — Medication 40 MG: at 12:59

## 2024-03-16 RX ADMIN — METOPROLOL TARTRATE 5 MG: 1 INJECTION, SOLUTION INTRAVENOUS at 23:41

## 2024-03-16 RX ADMIN — PREDNISONE 5 MG: 5 TABLET ORAL at 13:07

## 2024-03-16 ASSESSMENT — ACTIVITIES OF DAILY LIVING (ADL)
ADLS_ACUITY_SCORE: 29

## 2024-03-16 NOTE — PROGRESS NOTES
Nephrology Progress Note  03/16/2024         Assessment & Recommendations:   Sofie Rodriguez is a 63 year old year old female with ESKD, COPD s/p b/l lung transplant in 6/2022 with multiple complications, gastroparesis s/p GJ tube, GIB, chronic diarrhea, recurrent c-diff, FTT with inability to tolerate any tube feeds, R hip fx s/p ORIF 12/2023, admitted on 2/10 for initiation of TPN/lipids, transferred to ICU on 2/17 with worsening mental status and respiratory acidosis in spite of bipap, ultimately intubated on 2/18, being treated for PNA, also with volume overload in setting of high volume TPN. Persistent respiratory acidosis in spite of volume off, transferred to ICU for hypotension and respiratory failure, improved on bipap, now floor status again 3/1    # ESKD - TTS, LUE AVG, 3hr, 45.5kg (will be lowered), [facility], Dr. Andres Fairbanks. She has been losing weight and now even below her recent set EDW.  - With TPN condensed to 640 ml max per day, we can manage volume with HD 3x/week  - Continue HD on TTS schedule  - Requires EMLA cream an hour before HD  - please avoid PICC which will compromise future dialysis accesses; a midline is much preferred for this patient  - Dialyzing today per TTS schedule     # Dialysis access: LUE AVG placed 3-4 months ago, per pt. She had arm swelling and a fistulogram a couple months ago and swelling improved but now has redeveloped.  - US with c/f possible steal syndrome  - per vascular surgery, no concern for steal syndrome, ok to go ahead with fistulogram  - given that AVF is working well on dialysis (450 BFR) and at this time IR is only able to perform fistulograms when AVF isn't working well, will defer to OP for management.     # Persistent respiratory acidosis:    - pt doesn't tolerated bipap and often refuses to wear  - transplant pulm following    # Volume/BP: Anuric; on metoprolol soln 25 mg bid (held)  - gently challenging EDW as able, will achieve 41 kg  "today  - please record TPN in I/O's (not being recorded most days)  - appreciate condensing TPN (currently 400-600 ml per day)     # Nutrition: on TPN and regular diet  - per team, concern is that pt isn't absorbing much PO or tube feeds with diarrhea increasing significantly with increase in tube feeds; thus needing TPN     # Anemia 2/2 ESKD  On Venofer 50 qwk, Mircera last dose 1/9/2024  - hgb 9's --> 10.0 today  - Will continue Venofer 50 mcg q week (Tuesday)  - continue epogen 8000 units via HD    # BMD:   - Ca 8.9, phos 5.4, alb 3.4  - sevelamer on hold --> will watch for now, may need to resume tomorrow       Recommendations were communicated to primary team via note     Joaquina Dennis DO   Division of Renal Disease and Hypertension  [phone]    Interval History :   Seen on dialysis, tolerating well, agreeable to 3.5 hr run as planned, UF goal 2kg, AVF working well, no complaints at present    Review of Systems:   4 point ROS neg other than as noted above    Physical Exam:   I/O last 3 completed shifts:  In: 1852.2 [P.O.:1055; NG/GT:360]  Out: -    /56   Pulse 94   Temp 98.2  F (36.8  C) (Oral)   Resp 18   Ht 1.57 m (5' 1.81\")   Wt 43.2 kg (95 lb 3.8 oz)   SpO2 98%   BMI 17.53 kg/m       GENERAL APPEARANCE: NAD. Resting in bed undergoing dialysis  PULM: unlabored on NC 5L  CV: regular rhythm, normal rate      -trace edema in LEs  GI: soft   INTEGUMENT: no cyanosis, no rashes on exposed skin  NEURO: alert and oriented, no focal deficit observed  Access Left AVG     Labs:   All labs reviewed by me  Electrolytes/Renal -   Recent Labs   Lab Test 03/16/24  0800 03/16/24  0651 03/15/24  0554 03/14/24  0553   NA  --  137 137 140   POTASSIUM  --  4.5 4.0 4.3   CHLORIDE  --  101 100 104   CO2  --  24 26 24   BUN  --  79.4* 47.2* 85.4*   CR  --  3.90* 2.72* 4.30*   * 104* 110* 117*   ESTUARDO  --  8.9 8.7* 8.8   MAG  --  1.9 1.7 1.9   PHOS  --  5.4* 3.5 4.7*       CBC -   Recent Labs   Lab Test " 03/16/24  0651 03/14/24  0553 03/12/24  0613   WBC 7.7 6.2 6.3   HGB 10.0* 9.6* 9.7*    212 227       LFTs -   Recent Labs   Lab Test 03/16/24  0651 03/15/24  0554 03/14/24  0553   ALKPHOS 121 117 111   BILITOTAL 0.3 0.3 0.2   ALT <5 8 6   AST 17 17 16   PROTTOTAL 5.9* 5.9* 5.7*   ALBUMIN 3.4* 3.6 3.4*       Iron Panel -   Recent Labs   Lab Test 09/26/22  0555 09/03/22  1039 08/24/22  0810   IRON 54 21* 41   IRONSAT 22 9* 21   CARLOS 769* 343* 334*         Imaging:  All imaging studies reviewed by me.     Current Medications:   calcium carbonate-vitamin D  1 tablet Per J Tube TID w/meals    cyanocobalamin  500 mcg Per Feeding Tube Daily    cycloSPORINE modified  200 mg Per G Tube BID IS    epoetin tre-epbx  8,000 Units Intravenous Once in dialysis/CRRT    fidaxomicin  200 mg Oral BID    heparin ANTICOAGULANT  5,000 Units Subcutaneous Q12H    levalbuterol  1.25 mg Nebulization 2 times daily    levothyroxine  25 mcg Oral QAM AC    lidocaine  1 patch Transdermal Q24h    liothyronine  2.5 mcg Oral BID    lipids 4 oil  250 mL Intravenous Once per day on Monday Tuesday Wednesday Thursday Friday    [Held by provider] metoprolol  25 mg Per J Tube BID    OLANZapine zydis  5 mg Oral At Bedtime    pantoprazole  40 mg Per J Tube BID    predniSONE  5 mg Per J Tube QAM    And    predniSONE  2.5 mg Per J Tube QPM    [Held by provider] sevelamer carbonate (RENVELA)  0.8 g Oral BID    sodium chloride (PF)  10 mL Intracatheter Q8H    sulfamethoxazole-trimethoprim  1 tablet Oral Q Mon Wed Fri AM      dextrose      - MEDICATION INSTRUCTIONS -      parenteral nutrition - ADULT compounded formula CYCLE 58 mL/hr at 03/15/24 2159    - MEDICATION INSTRUCTIONS -      sodium chloride 0.9%      - MEDICATION INSTRUCTIONS -       Joaquina Dennis DO    I spent 25 minutes on this date of encounter reviewing chart, evaluating patient, formulating plan of care, and documenting.

## 2024-03-16 NOTE — CONSULTS
[facility]  Neurology Consultation    Patient Name:  Sofie Rodriguez  MRN:  [id number]    :  1960  Date of Service:  2024  Primary care provider:  Vinny Berrios      Neurology consultation service was asked to see Sofie Rodriguez by Dr. Atkins to evaluate for neuromuscular cause of hypercarbia.    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  Neurology was consulted to evaluate for possible neuromuscular disorder that would contribute to ongoing hypercarbia.    Patient had a prolonged hospitalization complicated by multiple ICU admissions, one time requiring intubation. The neurology team was previously consulted to evaluate for possible neurologic etiology for respiratory acidosis.  Workup included an MRI, which was negative for findings could explain depressed respiratory drive.  Was noted that her acidosis is significantly improved with use of BiPAP.    Since the last evaluation by neurology service, she continues to have persistent respiratory acidosis, with pH typically in the range of 7.14-7.24.  She has not worn BiPAP consistently due to claustrophobia.  NIF's and FVC's have been tracked intermittently. NIF trend -36 on 3/5 to -33 on 3/15. FVC trend 860 mL on 3/5 to 400 mL on 3/15.    On interview today, patient reports feeling quite well.  She denies feeling short of breath.  Notes that she has been able to walk the length of the hallway with physical therapy without significant dyspnea.  She feels steady on her feet.  Denies any significant weakness in her arms or legs prior to hospitalization. Does  report that being in the hospital has led to generalized weakness, however feels that her strength is gradually improving with ongoing work with therapies. She denies any fatigability in her muscles.  There are no muscle pain or cramps.  No new numbness or tingling in her extremities.  Has not experienced diplopia or blurry vision.     No known family history of neuromuscular conditions.  No known family history of myasthenia.    She does recall the NIF and FVC tests being done.  She is unable to say if she gave her full effort for the tests, but believes there may have been a leak during one of the NIF testings.    ROS  A comprehensive ROS was performed and pertinent findings were included in HPI.     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    Hepatitis 2017    Hep C, [facility]    History of blood transfusion     HTN (hypertension)     Infectious mononucleosis     Lung infection 11/30/2022    Osteopenia      Past Surgical History:   Procedure Laterality Date    BRONCHOSCOPY (RIGID OR FLEXIBLE), DIAGNOSTIC N/A 08/02/2022    Procedure: BRONCHOSCOPY, DIAGNOSTIC- inspection Bronch;  Surgeon: Kamala Lovell MD;  Location: [facility]    BRONCHOSCOPY (RIGID OR FLEXIBLE), DIAGNOSTIC N/A 09/13/2022    Procedure: INSPECTION BRONCHOSCOPY, WITH BRONCHOALVEOLAR LAVAGE;  Surgeon: Jose R Mccullough MD;  Location: [facility]    BRONCHOSCOPY (RIGID OR FLEXIBLE), DIAGNOSTIC N/A 11/09/2022    Procedure: BRONCHOSCOPY, WITH BRONCHOALVEOLAR LAVAGE AND BIOPSY;  Surgeon: Cesar Lima MD;  Location: [facility]    BRONCHOSCOPY (RIGID OR FLEXIBLE), DIAGNOSTIC N/A 01/25/2023    Procedure: BRONCHOSCOPY, WITH BRONCHOALVEOLAR LAVAGE AND BIOPSY;  Surgeon: Mason Reddy MD;  Location: [facility]    BRONCHOSCOPY (RIGID OR FLEXIBLE),  DIAGNOSTIC N/A 04/19/2023    Procedure: BRONCHOSCOPY, WITH BRONCHOALVEOLAR LAVAGE AND BIOPSY;  Surgeon: Kamala Lovell MD;  Location:  GI    BRONCHOSCOPY (RIGID OR FLEXIBLE), DIAGNOSTIC N/A 07/12/2023    Procedure: BRONCHOSCOPY, WITH BRONCHOALVEOLAR LAVAGE AND BIOPSY;  Surgeon: Cesar Lima MD;  Location:  GI    BRONCHOSCOPY FLEXIBLE AND RIGID N/A 07/19/2022    Procedure: BRONCHOSCOPY inspection only;  Surgeon: Bob Liao MD;  Location:  GI    COLONOSCOPY  2015    CORONARY ANGIOGRAPHY ADULT ORDER      CV CORONARY ANGIOGRAM N/A 06/30/2021    Procedure: CV CORONARY ANGIOGRAM;  Surgeon: Alexander Cuellar MD;  Location:  HEART CARDIAC CATH LAB    CV RIGHT HEART CATH MEASUREMENTS RECORDED N/A 06/30/2021    Procedure: CV RIGHT HEART CATH;  Surgeon: Alexander Cuellar MD;  Location:  HEART CARDIAC CATH LAB    ENDOSCOPIC PERORAL MYOTOMY N/A 10/09/2023    Procedure: MYOTOMY, ESOPHAGUS, ENDOSCOPIC, ORAL APPROACH;  Surgeon: Gonzalez Navarro MD;  Location:  OR    ENDOSCOPIC RETROGRADE CHOLANGIOPANCREATOGRAM N/A 08/11/2022    Procedure: ENDOSCOPIC RETROGRADE CHOLANGIOPANCREATOGRAPHY WITH PANCREATIC DUCT NEEDLE KNIFE AND STENT PLACEMENT, BILE DUCT SPHINCTEROTOMY, BLOOD/DEBRIS REMOVAL AND STENT PLACEMENT;  Surgeon: Cosmo Arroyo MD;  Location: [facility]    ENDOSCOPIC RETROGRADE CHOLANGIOPANCREATOGRAM N/A 10/07/2022    Procedure: ENDOSCOPIC RETROGRADE CHOLANGIOPANCREATOGRAPHY with biliary and pancreatic stent removal, debris removal;  Surgeon: Cosmo Arroyo MD;  Location:  OR    ENT SURGERY  1974    tonsillectomy    ENTEROSCOPY SMALL BOWEL N/A 08/11/2022    Procedure: SMALL BOWEL ENTEROSCOPY;  Surgeon: Cosmo Arroyo MD;  Location:  OR    ESOPHAGOGASTRODUODENOSCOPY, WITH NASOGASTRIC TUBE INSERTION N/A 07/01/2022    Procedure: ESOPHAGOGASTRODUODENOSCOPY, WITH NASOJEJUNAL TUBE INSERTION;  Surgeon: Ozzy Nickerson MD;  Location:  GI    ESOPHAGOSCOPY, GASTROSCOPY,  DUODENOSCOPY (EGD), COMBINED N/A 08/03/2022    Procedure: ESOPHAGOGASTRODUODENOSCOPY (EGD);  Surgeon: Ira Andres MD;  Location: [facility]    ESOPHAGOSCOPY, GASTROSCOPY, DUODENOSCOPY (EGD), COMBINED N/A 01/25/2023    Procedure: ESOPHAGOGASTRODUODENOSCOPY (EGD) with botox injection;  Surgeon: Gonzalez Navarro MD;  Location: [facility]    ESOPHAGOSCOPY, GASTROSCOPY, DUODENOSCOPY (EGD), COMBINED N/A 10/09/2023    Procedure: ESOPHAGOGASTRODUODENOSCOPY;  Surgeon: Gonzalez Navarro MD;  Location: [facility]    HAND SURGERY      INSERT CHEST TUBE Right 09/13/2022    Procedure: Insert chest tube;  Surgeon: Jose R Mccullough MD;  Location: [facility]    IR CVC TUNNEL PLACEMENT > 5 YRS OF AGE  09/26/2022    IR CVC TUNNEL PLACEMENT > 5 YRS OF AGE  02/14/2024    IR CVC TUNNEL REMOVAL RIGHT  3/6/2024    IR GASTRO JEJUNOSTOMY TUBE CHANGE  08/31/2022    IR GASTRO JEJUNOSTOMY TUBE CHANGE  12/21/2022    IR GASTRO JEJUNOSTOMY TUBE CHANGE  07/12/2023    IR GASTRO JEJUNOSTOMY TUBE CHANGE  08/18/2023    IR GASTRO JEJUNOSTOMY TUBE CHANGE  11/14/2023    IR GASTRO JEJUNOSTOMY TUBE PLACEMENT  07/27/2022    IR THORACENTESIS  08/29/2022    LEEP TX, CERVICAL  04/07/2017    HECTOR III    LYMPH NODE BIOPSY Left 2005    Left axilla, benign- [facility]    MIDLINE INSERTION - DOUBLE LUMEN Left 07/28/2022    20cm, Basilic vein    PICC DOUBLE LUMEN PLACEMENT Right 03/04/2024    5FR DL PICc, basiliv vein- L-36cm, 1cm out.    REPLACE GASTROJEJUNOSTOMY TUBE, PERCUTANEOUS  10/07/2022    Procedure: Replace Gastrojejunostomy Tube;  Surgeon: Cosmo Arroyo MD;  Location: [facility]    THORACENTESIS Left 08/29/2022    Procedure: THORACENTESIS;  Surgeon: Bo Capone PA-C;  Location: [facility] OR    THORACENTESIS Left 09/13/2022    Procedure: Thoracentesis;  Surgeon: Jose R Mccullough MD;  Location: [facility]    THROMBECTOMY UPPER EXTREMITY Left 07/02/2022    Procedure: LEFT RADIAL ARM THROMBECTOMY;  Surgeon: Christie Graham MD;  Location: [facility]    TRANSPLANT  "LUNG RECIPIENT SINGLE X2 Bilateral 06/28/2022    Procedure: Clamshell Incision, Bilateral Sequential Lung Transplant, On Cardiopulmonary Bypass, Flexible Bronchoscopy;  Surgeon: Sue Sunshine MD;  Location: [facility]       Medications   I have personally reviewed the patient's medication list.     Family History    Reviewed, and notably negative for neuromuscular conditions and myasthenia gravis      Physical Examination   Vitals: BP (!) 156/75 (BP Location: Right arm)   Pulse 101   Temp 98.5  F (36.9  C) (Oral)   Resp 18   Ht 1.57 m (5' 1.81\")   Wt 44.8 kg (98 lb 12.3 oz)   SpO2 95%   BMI 18.18 kg/m    General: Lying in bed, NAD  Head: NC/AT  Respiratory: non-labored on RA, no accessory muscle use or paradoxical breathing.  Able to count to 20 in the single breath.  Psych: Mood pleasant, affect congruent  Neuro:  Mental status: Awake, alert, attentive, oriented to self, time, place, and circumstance. Language is fluent and coherent with intact comprehension of complex commands, naming and repetition.  Cranial nerves: VFF, PERRL, conjugate gaze, EOMI without decrement with holding upward gaze, facial sensation intact, face symmetric, shoulder shrug strong, tongue/uvula midline, no dysarthria. No diplopia with sustained upward gaze.   Motor: Decreased muscle bulk, but normal tone.  4+/5 strength bilaterally in deltoids, biceps, triceps, hand , hip flexors, hip extensors, knee flexion, knee extension, plantarflexion, dorsiflexion.  Strength with neck flexion 5/5.  Reflexes: Normo-reflexic and symmetric biceps, brachioradialis, patellae, unable to elicit achilles. Negative Lugo, no clonus, toes up-going.  Sensory: Reduced sensation to vibration at the toes, normalizing at the ankles.  She has 2 to 3 seconds vibratory sensation at the toes, 10 seconds at the bilateral ankles.  Vibratory sensation greater than 16 seconds at the bilateral index fingers.    Coordination: FNF and HS without ataxia or " dysmetria. Rapid alternating movements intact.   Gait: Not assessed    Investigations   I have personally reviewed pertinent labs, tests, and radiological imaging. Discussion of notable findings is included under Impression.     Was patient transferred from outside hospital?   No    Impression  Sofie Rodriguez is a 63 year old female with history of COPD status post bilateral lung transplant in June 2022 complicated by hemidiaphragm palsy and recurrent pneumonias, gastroparesis and small bowel dysmotility now status post PEG/J tube, end-stage renal disease on intermittent hemodialysis who was admitted for failure to thrive who has had persistent hypercarbia with low NIF and FVC.  Neurology service was asked to evaluate for possible neuromuscular etiology of her respiratory acidosis.  Possible neurologic conditions that can cause respiratory acidosis include ALS, myasthenia gravis, peripheral neuropathies, myopathies.    Patient's exam is overall reassuring.  She was mentating clearly, and not requiring supplemental oxygen.  Her neurologic exam was essentially unremarkable.  All 4 extremities were strong, she had no decrement with sustained muscle activation.  Her ability to count to 20 with 1 breath is low normal.  There were no fasciculations or upper motor neuron signs.  Sensation was intact.  Her muscles were nontender.     Given her reassuring exam and negative family history for the conditions outlined above, feel that neuromuscular etiology for her hypercarbia and respiratory acidosis is exceedingly unlikely.  Her previous workup has included an MRI to evaluate for central nervous system lesion that could explain respiratory drive depression and this was also negative.  Her respiratory acidosis is more likely to be related to her multiple pulmonary comorbidities including recurrent pneumonias, lung transplant hemidiaphragm palsy, and inability to consistently tolerate BiPAP.    Recommendations  -No obvious  clinical history or exam findings to suggest neurologic/neuromuscular causes of respiratory weakness  -Neuropathy labs currently pending  -Neurology will reassess patient 3/17    Thank you for involving Neurology in the care of Sofie Rodriguez.  Please do not hesitate to call with questions/concerns (consult pager [phone]).      Patient was seen and discussed with Dr. Baron.    Raghav Haider MD  Neurology resident, PGY-1

## 2024-03-16 NOTE — PLAN OF CARE
Goal Outcome Evaluation:      Plan of Care Reviewed With: patient    Overall Patient Progress: no changeOverall Patient Progress: no change    Outcome Evaluation: Alert and oriented x4. Denied pain. C/o nausea after eating small amt for dinner. On calorie counts. PRN zofran administered with effect. Unable to take much po. Tube feeds increased to 25mL/hr. Increase to goal of 35mL/hr on 3/16 at 1700. TPN and lipids infusing per orders. Pt agreeable to wearing Bipap at midnight. Lidocaine patch right hip. Plan for dialysis tomorrow AM.

## 2024-03-16 NOTE — PROGRESS NOTES
Calorie Count  Intake recorded for: 3/15  Total Kcals: 35 Total Protein: 0g  Kcals from Hospital Food: 35   Protein: 0g  Kcals from Outside Food (average):0 Protein: 0g  # Meals Ordered from Kitchen: 3  # Meals Recorded: 1 meal recorded  Meal 1: 50% garden salad with [place] dressing  # Supplements Recorded: No supplements recorded

## 2024-03-16 NOTE — PLAN OF CARE
Goal Outcome Evaluation:      Plan of Care Reviewed With: patient    Overall Patient Progress: no changeOverall Patient Progress: no change    Outcome Evaluation: No acute changes overnight. AOx4, VSS, on 1L NC, wore BIPAP intermittently throughout the night, around 4 hrs total. TF running at 25mls/hr tolerating well, TPN and lipids running through PICC. No PRNs given this shift. Dialysis scheduled for today. Call light in reach.

## 2024-03-16 NOTE — PLAN OF CARE
"BP (!) 156/75 (BP Location: Right arm)   Pulse 101   Temp 98.5  F (36.9  C) (Oral)   Resp 18   Ht 1.57 m (5' 1.81\")   Wt 44.8 kg (98 lb 12.3 oz)   SpO2 90%   BMI 18.18 kg/m      Care from: [id number]    VSS ex hypertensive and on 1 LPM nc. A&O x4. Good appetite and oral intake. TF is infusing at 25 mL/hr, rate increase is due at 1700. 1 loose BM. HD was done today. Changed peg tube dressing. R PICC lumens are heparin locked, fistula is CDI. Dyspnea on exertion, infrequent dry cough, diminished lung sounds in BLL. Applied new mepilex on blanchable redness at sacrum. Generalized weakness, up w SBA. Call light within reach. Continue to follow poc.      Goal Outcome Evaluation:    Plan of Care Reviewed With: patient    Overall Patient Progress: no change    Outcome Evaluation: A&O x4, VSS on 1L nc, TF is infusing at 25 mL/hr through peg, PICC lumens are heparin locked, HD was done today, 1 loose BM  "

## 2024-03-16 NOTE — PROGRESS NOTES
Lung Transplant Consult Follow Up Note   March 16, 2024                  Assessment and Plan:   Sofie Rodriguez is a 63 year old female with h/o COPD s/p BSLT (2022) with course complicated by post-operative hemidiaphragm palsy, recurrent PNAs, positive DSA, EBV viremia, hypogammaglobulinemia, severe gastroparesis s/p G/J tube placement (7/27/22) and pyloric botox (1/25/23), GI bleed 2/2 pyloric ulcer, hemobilia s/p ERCP and MRCP, chronic diarrhea, recurrent C diff colitis, ESRD on iHD, recurrent falls with injuries (recent right hip fx s/p ORIF 12/2023), deconditioning, and FTT.  Admitted 2/10 for initiation of TPN/lipids.  Transferred to ICU 2/17 for emergent intubation for hypoxic and hypercapneic respiratory failure with severe respiratory acidosis and encephalopathy.  iHD increased, infectious workup unremarkable. Extubated 2/19. Persistent and progressive hypercapnia with severe acidosis, returned to ICU 2/28-2/29 d/t tenuous respiratory status. Improvement noted when able to tolerate NIPPV. Several medication changes made 3/11 to evaluate effects on diarrhea. C diff positive 3/13.      Today's recommendations:  - Cyclosporine increased to 200 mg bid, repeat level 3/18/24 (ordered)  - would repeat metabolic cart once on stable enteral and PO feeds and off TPN for several days; elevated RQ suggests possible overfeeding when on TPN  - Taper off TPN (defer to primary team and dietitians), trial low rate TF   - I will bring a nasal mask for AVAPs on Monday 3/18, agree with trialing some anxiolysis at bedtime to tolerate current mask if possible  - Neurology consult given the progressively worsening FVC and NIF  - evaluating for vitamin deficiencies: copper, zinc, folate, Thiamine pending from 3/16 in setting of peripheral neuropathy  - Daily AM VBG  - Repeat CMV, Prospera, and EBV ordered 3/18  - Repeat DSA 4/6  - encourage ongoing Palliative service support       Acute on chronic hypoxic/hypercapneic respiratory  failure:  S/p bilateral sequential lung transplant for COPD:   Hypoventilation, Suspected CARLEE: CT PTA (2/7) with decreased MARLON opacities but new tree in bud RLL opacities.  PFTs unchanged in clinic (but ATS criteria not met), remain significantly below her baseline.  Baseline hypoxia with 2L NC overnight.  Respiratory decompensation with encephalopathy and severe respiratory acidosis on 2/17.  S/p intubation 2/17-2/19.  Repeat CT (2/17) with new patchy consolidative and nodular opacities, GGO primarily in the bases and intralobular septal thickening (concerning for pulmonary edema given recent addition of TPN nutrition, new infection.  Bronch with lavage of RML (2/18) with very friable tissue, cutlures only with C. glabrata.  S/p empiric Zosyn (2/17-2/24) and micafungin (2/18-2/21).  Severe respiratory acidosis with hypercapnia persisting with slight improvement with NIPPV treatment.  Persistent respiratory failure  also complicated by  hypoventilation, and deconditioning d/t malnutrition.     Intermittent tolerance of NIPPV, some improvement with transition to AVAPS. Neurology consulted 2/27. Transferred to ICU 2/28 given tenuous respiratory status and potential need for reintubation.. Improvement noted with continuous NIPPV with minimal interruptions (sodium bicarb also stopped, likely partially contributing), trial off NIPPV during the day started 2/29. MRI brain (3/1) with moderate leukoaraiosis, no acute intracranial pathology.  SNIFF testing performed 3/8- Movement of bilateral hemidiaphragm with respiration without paradoxical movement. Currently wearing AVAPS for several hours per night PCO2 began decreasing with use and with decrease in feeds. Metabolic CART suggesting overfeeding on TPN. However, NIF and FVC continue to downtrend over 3/5-3/15, prompting concern for potential neuromuscular cause.      Overall, her PCO2 retention is likely multifactorial with a component being from overfeeding, inability to  compensate due to renal failure and bicarb loss with diarrhea, some portion of hypoventilation with declining FVC concerning for neuromuscular etiology and inability to consistently tolerate AVAPs at night due to claustrophobia.   - VBG improving slightly when wearing AVAPS and with deescalation of TPN  - Neuro reassessment in setting of worsening peripheral neuropathy and declining NIF and FVC     Date FVC NIF   3/5 830 -40   3/14 500 -25   3/15 400 -33     - Check daily AM VBG  - AVAPS at night and with naps, may need better titration if this is true NM weakness  - Delivering nasal mask on 3/18 (Dr. Franks will provide)  - Xopenex BID (2/27)  - Defer ABX at this time  - Sleep clinic eval indicated as OP  - Palliative following     Immunosuppression:  ImmuKnow low at 108 on 1/10.  AZA previously stopped 5/2023 d/t low ImmuKnow assay and EBV viremia.  Repeat ImmuKnow (2/27) low at 88.  Change from [place] to CSA 3/11.  - CSA goal 140-170.  CSA increased to 200 mg bid, repeat on 3/18  - Prednisone 5 mg qAM / 2.5 mg qPM     Prophylaxis:   - Discontinue dapsone (3/11). Start bactrim single strength qM/W/F.  If diarrhea persists, change to pentamidine.  - CMV ppx not currently indicated, D+/R+, CMV negative 2/19 (BAL very mildly positive 2/18, likely not clinically significant), repeat CMV ordered 3/18     Positive DSA: PFTs and pulmonary symptoms have remained stable as OP, so AMR treatment deferred given frailty.  DSA DQB2 mfi [id number] on 2/7 and stable to decreased on 3/6 5667.  Most recent cell-free DNA Prospera (2/18) mildly elevated at 1.04 (concerning for possible rejection), which was increased from prior level of 0.12 (1/10).  IST increase deferred at that time per Dr. Gong.  S/p IVIG (2/27) for DSA (IgG WNL).  - Repeat DSA 4/6  - Repeat Prospera ordered 3/18      EBV viremia: CT CAP (2/7) without lymphadenopathy.  Most recent level (2/18) improved to 28k from 96k (2/7)  - Repeat EBV ordered 3/18     Other relevant  problems being managed by the primary team:      FTT:  Severe protein calorie malnutrition:  Gastroparesis s/p PEG/J, botox, and G-POEM:  SB hypomotility:  Pyloric ulcer:  Chronic nausea and osmotic diarrhea:  SIBO s/p rifaximin:   Recurrent C diff colitis: Chronic osmotic and infectious diarrhea since transplant with recurrent episodes of C diff.  Notable weight loss (40# in a year) d/t diarrhea, GI dysmotility, and intolerance of enteral feeds (PEG/J tube in place), most recently on elemental formula.  Extensive OP eval and f/u with GI.  S/p port placement for TPN and lipids. Despite 5 weeks of inpatient TPN she demonstrated no true improvement in nutrition, strength or diarrhea and TPN will not be a feasible option for home. Changed immunosuppressants, treating C diff and retrialing enteral nutrition and as tolerated PO intake.   - Retrialing low dose enteral nutrition   - Encourage PO nutrition and oral spupplements.  - C diff positive yet again although do not think this is the sole cause of her recurrent diarrhea, on fidaxomicin as of 3/13   -- Consider colonoscopy if no improvements with above     Peripheral Neuropathy: As of 3/15 she notes an initiation and increase in pins and needle sensation in her feet which seems to be worsening. It could be immunosuppression related, no hx of diabetes, but given the issues with nutrition would also be concerned for vitamin deficiency. B12 well replete  - Copper, zinc, folate, thiamine, B6 pending 3/16     ESRD: CT scan (with declining respiratory status) with volume overload secondary to TPN volume, iHD increased from PTA TThSa schedule with unsustained improvement.  Management per nephrology, dialysis via [facility].    - iHD 3x/week schedule (TTSa) will be three times per week once off TPN, per Nephrology     Goals of Care: Care conference held with palliative and primary team on 3/15. Ultimately, updated family (daughters) and Sofie regarding her overall status.  She feels hopeful that these new changes will be helpful. We did discuss that at any point if she decides that what we're doing is not in alignment with her quality of life or goals of care that we can transition to a comfort based approach but for now she wants to continue full restorative cares.      We appreciate the excellent care provided by the [place] [id number] team.  Recommendations communicated via this note and in person.  Will continue to follow along closely, please do not hesitate to call with any questions or concerns.    Claribel Franks MD  Pulmonary Transplant/CF Attending  Pager: [phone]  Vocera Web Console                  Interval History:   Wore bipap for 4 hours last night but with significant CO2 retention this morning. She denies headaches, dizziness, brain fog. Seen on dialysis this morning, she was a little chilled but tolerating it well. Feels breathing is completely at baseline. Denies any new weakness. Still has peripheral neuropathy in feet/lower extremities. No nausea/vomiting. No acid reflux. She feels like she's getting stronger. Stools improved during the day yesterday and then was up overnight with multiple stools, she has yet to have one today.            Review of Systems:   Per HPI          Medications:      calcium carbonate-vitamin D  1 tablet Per J Tube TID w/meals    cyanocobalamin  500 mcg Per Feeding Tube Daily    cycloSPORINE modified  200 mg Per G Tube BID IS    fidaxomicin  200 mg Oral BID    heparin ANTICOAGULANT  5,000 Units Subcutaneous Q12H    levalbuterol  1.25 mg Nebulization 2 times daily    levothyroxine  25 mcg Oral QAM AC    lidocaine  1 patch Transdermal Q24h    liothyronine  2.5 mcg Oral BID    lipids 4 oil  250 mL Intravenous Once per day on Monday Tuesday Wednesday Thursday Friday    [Held by provider] metoprolol  25 mg Per J Tube BID    OLANZapine zydis  5 mg Oral At Bedtime    pantoprazole  40 mg Per J Tube BID    predniSONE  5 mg Per J Tube QAM    And     predniSONE  2.5 mg Per J Tube QPM    [Held by provider] sevelamer carbonate (RENVELA)  0.8 g Oral BID    sodium chloride (PF)  10 mL Intracatheter Q8H    sulfamethoxazole-trimethoprim  1 tablet Oral Q Mon Wed Fri AM     acetaminophen, albumin human, albuterol, calcium carbonate, artificial tears, dextrose, glucose **OR** dextrose **OR** glucagon, diphenhydrAMINE **OR** diphenhydrAMINE, hydrOXYzine HCl, lidocaine 4%, lidocaine (buffered or not buffered), lidocaine (buffered or not buffered), lidocaine (buffered or not buffered), lidocaine (buffered or not buffered), lidocaine-prilocaine, [Held by provider] loperamide, naloxone **OR** naloxone **OR** naloxone **OR** naloxone, - MEDICATION INSTRUCTIONS -, ondansetron **OR** ondansetron, - MEDICATION INSTRUCTIONS -, senna-docusate **OR** senna-docusate, sodium chloride, sodium chloride (PF), sodium chloride (PF), sodium chloride 0.9%, sodium chloride 0.9%, - MEDICATION INSTRUCTIONS -         Physical Exam:   Temp:  [98.1  F (36.7  C)-99  F (37.2  C)] 98.2  F (36.8  C)  Pulse:  [] 93  Resp:  [16-20] 20  BP: (115-147)/(56-77) 121/70  Cuff Mean (mmHg):  [83-84] 83  SpO2:  [89 %-100 %] 98 %      Intake/Output Summary (Last 24 hours) at 3/16/2024 1216  Last data filed at 3/16/2024 1130  Gross per 24 hour   Intake 1177.2 ml   Output 2500 ml   Net -1322.8 ml         Gen: AA&Ox3, no acute distress, on RA in dialysis  HEENT:AT/ NC, PERRL b/l, EOM grossly intact, mucous membranes pink, moist without plaque or exudate  BACK: no CVA tenderness, no midline bony tenderness  PULM/THORAX: Clear to auscultation bilaterally, no rales/rhonchi/wheezes  CV:RRR, S1 and S2 appreciated, no extra heart sounds, murmurs or rub auscultated. No JVD  ABD: thin, soft, nontender, nondistended. Normoactive bowel sounds x 4, no HSM appreciated.  EXT: No edema, +clubbing, nocyanosis. No asymmetrical edema or tenderness to palpation in calves bilaterally.  MSK: muscle wasting present, no  supraclavicular fat pad             Data:   All laboratory and imaging data reviewed.    Results for orders placed or performed during the hospital encounter of 02/10/24 (from the past 24 hour(s))   CBC with Platelets & Differential    Narrative    The following orders were created for panel order CBC with Platelets & Differential.  Procedure                               Abnormality         Status                     ---------                               -----------         ------                     CBC with platelets and d...[[id number]]  Abnormal            Final result               Manual Differential[[id number]]          Abnormal            Final result                 Please view results for these tests on the individual orders.   Comprehensive metabolic panel   Result Value Ref Range    Sodium 137 135 - 145 mmol/L    Potassium 4.5 3.4 - 5.3 mmol/L    Carbon Dioxide (CO2) 24 22 - 29 mmol/L    Anion Gap 12 7 - 15 mmol/L    Urea Nitrogen 79.4 (H) 8.0 - 23.0 mg/dL    Creatinine 3.90 (H) 0.51 - 0.95 mg/dL    GFR Estimate 12 (L) >60 mL/min/1.73m2    Calcium 8.9 8.8 - 10.2 mg/dL    Chloride 101 98 - 107 mmol/L    Glucose 104 (H) 70 - 99 mg/dL    Alkaline Phosphatase 121 40 - 150 U/L    AST 17 0 - 45 U/L    ALT <5 0 - 50 U/L    Protein Total 5.9 (L) 6.4 - 8.3 g/dL    Albumin 3.4 (L) 3.5 - 5.2 g/dL    Bilirubin Total 0.3 <=1.2 mg/dL   Blood gas venous   Result Value Ref Range    pH Venous 7.14 (LL) 7.32 - 7.43    pCO2 Venous 83 (HH) 40 - 50 mm Hg    pO2 Venous 69 (H) 25 - 47 mm Hg    Bicarbonate Venous 28 21 - 28 mmol/L    Base Excess/Deficit Venous -2.6 -3.0 - 3.0 mmol/L    FIO2 30     Oxyhemoglobin Venous 86 (H) 70 - 75 %    O2 Sat, Venous 88.2 (H) 70.0 - 75.0 %    Narrative    In healthy individuals, oxyhemoglobin (O2Hb) and oxygen saturation (SO2) are approximately equal. In the presence of dyshemoglobins, oxyhemoglobin can be considerably lower than oxygen saturation.   Magnesium   Result Value Ref Range     Magnesium 1.9 1.7 - 2.3 mg/dL   Phosphorus   Result Value Ref Range    Phosphorus 5.4 (H) 2.5 - 4.5 mg/dL   CBC with platelets and differential   Result Value Ref Range    WBC Count 7.7 4.0 - 11.0 10e3/uL    RBC Count 2.80 (L) 3.80 - 5.20 10e6/uL    Hemoglobin 10.0 (L) 11.7 - 15.7 g/dL    Hematocrit 34.8 (L) 35.0 - 47.0 %     (H) 78 - 100 fL    MCH 35.7 (H) 26.5 - 33.0 pg    MCHC 28.7 (L) 31.5 - 36.5 g/dL    RDW 19.0 (H) 10.0 - 15.0 %    Platelet Count 203 150 - 450 10e3/uL    % Neutrophils      % Lymphocytes      % Monocytes      % Eosinophils      % Basophils      % Immature Granulocytes      NRBCs per 100 WBC 0 <1 /100    Absolute Neutrophils      Absolute Lymphocytes      Absolute Monocytes      Absolute Eosinophils      Absolute Basophils      Absolute Immature Granulocytes      Absolute NRBCs 0.0 10e3/uL   Manual Differential   Result Value Ref Range    % Neutrophils 77 %    % Lymphocytes 11 %    % Monocytes 5 %    % Eosinophils 4 %    % Basophils 0 %    % Myelocytes 3 %    Absolute Neutrophils 5.9 1.6 - 8.3 10e3/uL    Absolute Lymphocytes 0.8 0.8 - 5.3 10e3/uL    Absolute Monocytes 0.4 0.0 - 1.3 10e3/uL    Absolute Eosinophils 0.3 0.0 - 0.7 10e3/uL    Absolute Basophils 0.0 0.0 - 0.2 10e3/uL    Absolute Myelocytes 0.2 (H) <=0.0 10e3/uL    RBC Morphology Confirmed RBC Indices     Platelet Assessment  Automated Count Confirmed. Platelet morphology is normal.     Automated Count Confirmed. Platelet morphology is normal.    Polychromasia Slight (A) None Seen   Glucose by meter   Result Value Ref Range    GLUCOSE BY METER POCT 115 (H) 70 - 99 mg/dL     *Note: Due to a large number of results and/or encounters for the requested time period, some results have not been displayed. A complete set of results can be found in Results Review.

## 2024-03-16 NOTE — PROGRESS NOTES
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 On 2/17/24 she was transferred to ICU for worsening mental status and acute hypoxic and hypercarbic respiratory failure inspite of BiPAP requiring intubation. She was suspected to have PNA and started on antibiotics. Extubated on 2/19 without complications and tolerated iHD on 2/20, and tolerated PO regular diet in addition to TPN. Developed progressively worsening respiratory acidosis and hypercarbia, and was re-admitted to ICU on 2/28/24 for close monitoring of intermittent BiPAP and possible intubation, however she improved on AVAPS setting and well enough to transfer back to medicine floor on 2/29/24. Currently working on balancing volume status with current TPN plan, improving ventilation, and GOC/disposition planning.       Changes today:   - No Bipap used overnight  -Neuro consulted--no neuromuscular component contributing to breathing  -dialysis today  - Stop TPN  -Last night of TPN  -Labs for neuropathic pain pending    #GOC  Care conference on 3/15 with Transplant Pulm, [facility], Medicine, daughters (Charity  Julia) and Transplant SW. Overall medical updates provided and Qs answered. MIP/MEP worsened; thus concern for neuromuscular component contributing to CO2 retention. Neuro consulted 3/16 and state that there is no neuromuscular component. Claustrophobia inhibiting pt to wear BiPAP; will start on olanzapine. Will be off TPN by 3/16 ideally. Can do 3d/wk dialysis if TPN off.Will continue PT and c.diff treatment.     #Severe respiratory acidosis, improved on BiPAP  #AHRF (resolved)  #Acute hypercarbic respiratory failure  #S/P Lung transplant 2022 c/b right hemidiaphragm palsy  #Recurrent pneumonia, resolved  Patient received a bilateral lung transplant 6/28/22 for COPD. Was admitted 2/10 to address malnutrition and admitted to ICU on 2/17 with hypoxic hypercarbic respiratory failure. Intubated on 2/18 AM  due to worsening oxygen requirements, likely due to pulmonary edema given hx of ESRD requiring HD vs infection given hx of lung transplant, immunosuppressed status, and recurrent pneumonias. Extubated on 2/19 without complications. Has had issues with rising pCO2 that is responsive to BiPAP, but due to refusal, has required transferred to ICU (on 2/28 AM). Neurology consulted and MRI r/o central cause problem for respiratory drive. Started on AVAPS with improvement in mental status and VBG, and patient transferred back to medicine floor on 2/29.    - PRN hypertonic saline inhaler with albuterol nebs  - Transplant pulmonology following, recs appreciated  - PTA immunosuppressive agent  >Prednisone 5 mg every morning, 2.5 mg every afternoon; Stop tacrolimus (goal 6-8; 4 mg AM/ 3.5mg PM) and cont Cyclosporin 200mg BID (3/11-*)    > overnight oximetry study suggestive of O2 vs CPAP need, as did require up to 2LPM overnight to prevent hypoxia  > Try to minimize O2 to preserve respiratory drive, will give IVIG for DSA+   - avoid HFNC, maintain minimum oxygen to keep SaO2 >85%   - continue AVAPS when sleeping (overnight and  during naps)  - MIP/MEP testing significantly decreased from previous--> consult Neuro; No neuromuscular cause of pCO2 retention  - Discuss GOC w/ Pall Care; Care conf this week  - Stop PTA dapsone MWF for PJP prophylaxis; Cont Bactrim (3/11)    - Limit medications that would depress respiration   - d/c'd theophylline 3/3/24 due to difficulty attaining therapeutic levels (started by ICU)  - continue thyroid function as below  - awaiting metabolic CART study results  - may need BiPAP at home as patient reports not having one. Sleep clinic referral at discharge.   -       Antibiotics:  Vancomycin (2/17 - 2/17)  Zosyn (2/17 - 2/23) for HAP  Bactrim MWF, PJP ppx (previously on Dapson)  Micafungin (2/18- 2/21)  Fidaxomicin (3/13-*)     Immunosuppression  Cyclosporin (previously on Tacro)  Prednisone     #Severe malnutrition   #FTT   #Hypoalbuminemia  #Gastroparesis, small bowel dysmotility  #S/P PEG/J with intolerance of enteral nutrition  # Chronic osmotic diarrhea/SIBO s/p Rifaximin  #Recurrent C.Diff (3rd ep)    Patient with gastroparesis (presumed due to vagal injury) and small bowel dysmotility complicated by unintentional 40lb weight loss over the past year and now severe malnutrition. Previously intolerant to oral food intake due to nausea. Was initially admitted for portacath and TPN initiation since 2/13. After extubation has been able to tolerate feeds without n/v from 2/20. Electrolytes trending towards baseline today.  Per GI, next steps for workup for malnutrition would include CT enterography to evaluate for anatomic abnormalities contributing to malnutrition vs possible treatment for SIBO (though patient has had multiple courses of treatment in the past with no long term improvement). Patient couldn't tolerate the oral load for CT enterography on 2/21, so will defer this until she is able to tolerate greater PO load. On 2/23 , again discussed with patient the need of small bowel motility study if not  improving outpatient through [place]. No ongoing concerns for SIBO on 2/23 as patient is passing multiple episodes of stools and gas with no abdominal pain or distention. C-diff test negative on 2/21. Per RD, patient tolerating >300 kcal of intake PO currently, so stopped trickle Tube feeds and continuing with PO and TPN for nutrition (sufficient for preventing gut atrophy).  Tunneled CVC removed on 3/7 without complications, after PICC placement. As of 3/11 transplant pulmonology is worried that TPN is not a realistic plan to continue at home and would like to transition back to TF (RD oncerned pt is not absorbing any oral intake). Transplant pulm aslo worried that her tacro and dapson could be contributing to diarrhea (mucosal toxicity). Repeat enteric studies showed recurrent C.diff;  Fidaxomicin x 10 days started (GI approved). Transplant pulm still requesting repeat colonoscopy w/ Bx after completion of c.diff treatment, to  r/o toxicity or other reason that diarrhea is present (since it has not improved w/ previous c.diff tx).  - Regular diet +  TPN +  Restart tube feeds per transplant pulm request   -Nutrition consulted, they have discussed plan with GI; TPN to complete 3/16  -GI consulted/signed off; appreciate recs   -PO Fidaxomicin 200 mg BID for 10 days    -Will reconsult after tx of C.diff for colonoscopy  - Nephrology consulted; appreciate recs  -limit fluid < 1.5 L per day for TPN ( chart vol of TPN in the I/O).     -May benefit from small bowel motility study if not improving outpatient through [place].     #B/L Neuropathic Pain   New pain b/l over the past few days. Last A1c <4.2 (7/11/23). End stage renal dz can cause it too, but will check for vitamin/mineral deficiencies. Medications can also be a culprit, and unsure if  fidaxomicin  has a side effect of neuropathy. Will evaluate more. TSH wnl. B12 elevated. B6, copper, B1, B3, zinc pending   -Consider gabapentin in the coming days     #Acute on chronic  anemia 2/2 ESRD  Hgb stable 7-8s, with no acute signs of bleeding. Aute drop 2/29 from 8.2 > 6.6 after two rechecks, no overt signs of bleeding; required 1U pRBC transfusion.    - continue epogen per nephrology with dialysis  - venofer 50 mcg qweek with dialysis     #ESRD on HD M/T/Th/Sat  #Hypervolemic hyponatremia  #Anion gap metabolic acidosis - resolved  #mild hyperphosphatemia  Patient is ESRD on T/Th/Sat HD as outpt, Hgb stabilizing. HD tolerating well. Due to TPN vol, pt requires 4x/wk dialysis (2hr UF on M; HD T/TH/Sat 3hrs). This new frequency of dialysis does make it challenging for placement or going home.   - Continue Venofer injections    - CBC and CMP daily  - Continue epogen dose 8000 units as per nephrology  - Strict I/Os  - HD per nephrology given hypervolemia; Plan for 2hr UF run on M; 3hr on T/Th/S. If TPN 640ml max daily, can manage volume with TTS HD    # Elevated TSH and low T4 and T3  Patient with new low T4 at 0.64 and TSH at 6.5, concerning for new hypothyroidism vs sick thyroid syndrome. TSH with appropriate response, more consistent with elevation in the setting of acute illness. ICU team on 2/28 recommended initiation of treatment for possible hypothyroid as this can improve respiratory muscle function in the setting of weakness and malnutrition.   - continue liothyronine and levothyroxine per ICU team recommendations  - repeat thyroid function 3/7 wnl    #Left upper extremity unilateral edema, improving   #Bilateral pedal and ankle edema, improving  Edema due to third spacing due to hypoalbuminemia vs HF. BNP >06817 at admission, Echo on 2/18 shows EF of 55-60% with collapsible IVC. Extremity edema 2/2 to hypoalbuminemia. Upper limb duplex USG on 2/18 negative for DVT.  USG left arm on 2/21 shows steel physiology and Vascular Surgery consulted (see below). Overall edema in extremities have improved significantly since initiating 4x/wk dialysis.    - Continue daily weights  - Strict  I/Os  - Elevation and wrapping of only lower legs as needed and increased UF per nephrology for volume management; no wrapping of Left upper extremity with dialysis fistula  - lymphedema consulted for BLE edema  - HD as noted above      #Steal physiology of LUE dialysis access fistula  LUE arterial US obtained 2/21 with concern for LUE edema. US of fistula demonstrated steal physiology. Discussed with nephrology, and vascular surgery consulted on 2/22. Vascular surgery has only minor concerns for steal symptoms and given that the AVF is working well, they are okay with outpatient fistulograms/ venoplasty with wrist brachial index and PPG's, unless new concerns arise.  - plan for outpatient workup as above; nephrology in agreement with outpatient workup.     #Facial and neck bruising  #Pain  Reports soreness in her neck from lying in bed. No soreness in the buttocks/ back. Patient has multiple bruises over her arms and face which is attributed to previous falls. No new bruising reported today. Patients reports her headaches are improving with tylenol. Using heating pads and lidocaine patch for body pains. Couple of small skin tears noted by the Rn's. Skin care done accordingly.  - Tylenol Q4  - Lidocaine patch prn  - Heating pads prn  - Diligent skin cares    # HTN  # A-fib, resolved  Noted atrial fibrillation on arrival with HR elevated at 150, not sustained for > 10 minutes and otherwise hemodynamically stable. RVR resolved w/o medications.   - PTA metoprolol 25mg BID - holding for now with lower BP with increased UF per nephrology, resume if becoming more hypertensive  - Continuous telemetry     # Encephalopathy secondary to hypercarbia - resolved  Patient was progressively more lethargic on 2/17 during admission in [facility]. Etiology likely secondary to hypercarbia in context of respiratory failure as patient was noted to have high CO2s concomitant with lethargy. Though receiving oxycodone and fentanyl,  lethargy was only partially alleviated by narcan. LFTs and ammonia wnl with low suspicion of hepatic encephalopathy. BUN wnl with low suspicion for uremic encephalopathy. Head CT on 2/18 was negative with low suspicion of acute intracranial pathology. Patient is awake, alert, oriented and following commands since 2/20.     # Right hip fracture s/p ORIF (December 2023)   - PT/OT    # GERD  - PTA PPI    # Hx EBV viremia   # Hypogammaglobulinemia  # Chronic immunosuppression 2/2 lung transplant  Patient has been afebrile and has not had leukocytosis however she is under immunosuppression. Given acute respiratory failure and hx of recurrent pneumonias, she was started on empiric abx for concerns over new pneumonia. RVP and cultures no growth to date. Last day of empirical treatment for HAP.  - EBV 27K (decreased compared to prior)     # RLE edema and pain - U/S DVT without DVT     Lines/tubes/drains:  - PIV x2  - PEG/J  - HD AV fistula, left arm   - PICC for TPN        Diet: Renal Diet (dialysis)  Calorie Counts  Adult Formula Drip Feeding: Continuous [employer] Renal Support; Jejunostomy; Goal Rate: begin TF with [employer] renal support, Initiate @ 15 ml/hr and advance by 10 ml Q 24 hr as tolerated to goal @ 35 ml/hr.  Do not start or advance unless K+ >3...  parenteral nutrition - ADULT compounded formula CYCLE  parenteral nutrition - ADULT compounded formula CYCLE    DVT Prophylaxis: resume subQ heparin  Dong Catheter: Not present  Fluids: TPN and PO  Lines: PRESENT      PICC 03/04/24 Double Lumen Right Basilic TPN. PICC okay to use.-Site Assessment: WDL  Hemodialysis Vascular Access Arteriovenous graft Superior Arm-Site Assessment: WDL (HD)      Cardiac Monitoring: None  Code Status: Full Code      Clinically Significant Risk Factors              # Hypoalbuminemia: Lowest albumin = 2.6 g/dL at 2/18/2024  5:13 AM, will monitor as appropriate             # Severe Malnutrition: based on nutrition assessment      #  Financial/Environmental Concerns: none         Disposition Plan       Patient seen and discussed with Dr. Frantz Diaz, PGY4  Internal Medicine-Pediatrics  Pager: [phone]      Chris   [facility]  Securely message with [employer] (more info)  Text page via [facility] Paging/Directory   See signed in provider for up to date coverage information  ______________________________________________________________________    Interval History   No acute events overnight. No BiPAP overnight. Did not notice difference w/ olanzapine. Overall feeling well. Diarrhea improving.   Answered pt and family Qs.     Physical Exam   Vital Signs: Temp: 98.5  F (36.9  C) Temp src: Oral BP: (!) 156/75 Pulse: 101   Resp: 18 SpO2: 95 % O2 Device: Nasal cannula Oxygen Delivery: 1 LPM  Weight: 98 lbs 12.26 oz  General: thin,sitting up in bed comfortably, no acute distress this morning. Very pleasant  HEENT: EOMI, NC in place   Pulm/Resp: breathing comfortably on RA, CTAB. No cough  CV: normal rate, regular rhythm. No LE edema  Neuro: alert and following commands, answering questions clearly and easily, moving all extremities.    Medical Decision Making       Please see A&P for additional details of medical decision making.      Data     I have personally reviewed the following data over the past 24 hrs:    7.7  \   10.0 (L)   / 203     137 101 79.4 (H) /  115 (H)   4.5 24 3.90 (H) \     ALT: <5 AST: 17 AP: 121 TBILI: 0.3   ALB: 3.4 (L) TOT PROTEIN: 5.9 (L) LIPASE: N/A     TSH: N/A T4: N/A A1C: N/A       Imaging results reviewed over the past 24 hrs:   No results found for this or any previous visit (from the past 24 hour(s)).

## 2024-03-16 NOTE — PROGRESS NOTES
HEMODIALYSIS TREATMENT NOTE    Date: 3/16/2024  Time: 12:41 PM    Data:  Pre Wt: 43.2 kg   Desired Wt: 41.2   kg   Post Wt: 41.2  kg (95 lb 10.9 oz)  Weight change: 2 kg  Ultrafiltration - Post Run Net Total Removed (mL): 2000 mL  Vascular Access Status: Graft  patent  Dialyzer Rinse: Streaked  Total Blood Volume Processed: 91.03 L   Total Dialysis (Treatment) Time: 3.5   Dialysate Bath: K 3, Ca 2.25  Heparin: None    Lab:   No    Interventions:  Pt dialyzed for 3.5 hrs via LUE AVG. Pt tolerated 2 L fluid removal.  8000 unit Epoetin administered during tx. AVG cannulated with 15g needles. Positive for thrill and bruit. 400 BFR achieved with good pressure. Hemostasis achieved within 10 min post tx. Handoff report given to IRYA Hare.       Assessment:  A&Ox4  Calm and cooperative  On 1L O2 via NC and 5L O2 when sleeping  VSS  SR  AVG +T/B     Plan:    Next HD per renal

## 2024-03-17 LAB
ALBUMIN SERPL BCG-MCNC: 3.5 G/DL (ref 3.5–5.2)
ALP SERPL-CCNC: 147 U/L (ref 40–150)
ALT SERPL W P-5'-P-CCNC: 12 U/L (ref 0–50)
ANION GAP SERPL CALCULATED.3IONS-SCNC: 11 MMOL/L (ref 7–15)
AST SERPL W P-5'-P-CCNC: 16 U/L (ref 0–45)
BACTERIA BLD CULT: NO GROWTH
BACTERIA BRONCH: ABNORMAL
BACTERIA BRONCH: NO GROWTH
BASE EXCESS BLDV CALC-SCNC: 0.2 MMOL/L (ref -3–3)
BILIRUB SERPL-MCNC: 0.2 MG/DL
BUN SERPL-MCNC: 53.4 MG/DL (ref 8–23)
CALCIUM SERPL-MCNC: 8.8 MG/DL (ref 8.8–10.2)
CHLORIDE SERPL-SCNC: 103 MMOL/L (ref 98–107)
CREAT SERPL-MCNC: 2.96 MG/DL (ref 0.51–0.95)
DEPRECATED HCO3 PLAS-SCNC: 27 MMOL/L (ref 22–29)
EGFRCR SERPLBLD CKD-EPI 2021: 17 ML/MIN/1.73M2
GLUCOSE BLDC GLUCOMTR-MCNC: 80 MG/DL (ref 70–99)
GLUCOSE SERPL-MCNC: 74 MG/DL (ref 70–99)
HCO3 BLDV-SCNC: 29 MMOL/L (ref 21–28)
MAGNESIUM SERPL-MCNC: 2.2 MG/DL (ref 1.7–2.3)
O2/TOTAL GAS SETTING VFR VENT: 21 %
OXYHGB MFR BLDV: 69 % (ref 70–75)
PCO2 BLDV: 70 MM HG (ref 40–50)
PH BLDV: 7.23 [PH] (ref 7.32–7.43)
PHOSPHATE SERPL-MCNC: 3.7 MG/DL (ref 2.5–4.5)
PO2 BLDV: 42 MM HG (ref 25–47)
POTASSIUM SERPL-SCNC: 3.9 MMOL/L (ref 3.4–5.3)
PROT SERPL-MCNC: 5.9 G/DL (ref 6.4–8.3)
SAO2 % BLDV: 70.8 % (ref 70–75)
SODIUM SERPL-SCNC: 141 MMOL/L (ref 135–145)

## 2024-03-17 PROCEDURE — 94640 AIRWAY INHALATION TREATMENT: CPT | Mod: 76

## 2024-03-17 PROCEDURE — 250N000009 HC RX 250

## 2024-03-17 PROCEDURE — 250N000011 HC RX IP 250 OP 636

## 2024-03-17 PROCEDURE — 999N000157 HC STATISTIC RCP TIME EA 10 MIN

## 2024-03-17 PROCEDURE — 83735 ASSAY OF MAGNESIUM: CPT | Performed by: STUDENT IN AN ORGANIZED HEALTH CARE EDUCATION/TRAINING PROGRAM

## 2024-03-17 PROCEDURE — 99233 SBSQ HOSP IP/OBS HIGH 50: CPT | Mod: 24 | Performed by: INTERNAL MEDICINE

## 2024-03-17 PROCEDURE — 99233 SBSQ HOSP IP/OBS HIGH 50: CPT | Mod: GC | Performed by: STUDENT IN AN ORGANIZED HEALTH CARE EDUCATION/TRAINING PROGRAM

## 2024-03-17 PROCEDURE — 250N000013 HC RX MED GY IP 250 OP 250 PS 637

## 2024-03-17 PROCEDURE — 250N000012 HC RX MED GY IP 250 OP 636 PS 637: Performed by: INTERNAL MEDICINE

## 2024-03-17 PROCEDURE — 258N000003 HC RX IP 258 OP 636

## 2024-03-17 PROCEDURE — 120N000003 HC R&B IMCU UMMC

## 2024-03-17 PROCEDURE — 250N000012 HC RX MED GY IP 250 OP 636 PS 637

## 2024-03-17 PROCEDURE — 80053 COMPREHEN METABOLIC PANEL: CPT

## 2024-03-17 PROCEDURE — 94640 AIRWAY INHALATION TREATMENT: CPT

## 2024-03-17 PROCEDURE — 84100 ASSAY OF PHOSPHORUS: CPT | Performed by: STUDENT IN AN ORGANIZED HEALTH CARE EDUCATION/TRAINING PROGRAM

## 2024-03-17 PROCEDURE — 250N000011 HC RX IP 250 OP 636: Performed by: STUDENT IN AN ORGANIZED HEALTH CARE EDUCATION/TRAINING PROGRAM

## 2024-03-17 PROCEDURE — 250N000013 HC RX MED GY IP 250 OP 250 PS 637: Performed by: STUDENT IN AN ORGANIZED HEALTH CARE EDUCATION/TRAINING PROGRAM

## 2024-03-17 PROCEDURE — 82805 BLOOD GASES W/O2 SATURATION: CPT

## 2024-03-17 RX ADMIN — CYCLOSPORINE 200 MG: 100 SOLUTION ORAL at 17:47

## 2024-03-17 RX ADMIN — CALCIUM CARBONATE 600 MG (1,500 MG)-VITAMIN D3 400 UNIT TABLET 1 TABLET: at 17:47

## 2024-03-17 RX ADMIN — SODIUM CHLORIDE 1 MG/MIN: 9 INJECTION, SOLUTION INTRAVENOUS at 03:57

## 2024-03-17 RX ADMIN — LIDOCAINE 4% 1 PATCH: 40 PATCH TOPICAL at 20:55

## 2024-03-17 RX ADMIN — LEVALBUTEROL HYDROCHLORIDE 1.25 MG: 1.25 SOLUTION RESPIRATORY (INHALATION) at 10:30

## 2024-03-17 RX ADMIN — CYCLOSPORINE 200 MG: 100 SOLUTION ORAL at 07:50

## 2024-03-17 RX ADMIN — Medication 12.5 MG: at 07:51

## 2024-03-17 RX ADMIN — FIDAXOMICIN 200 MG: 200 TABLET, FILM COATED ORAL at 07:51

## 2024-03-17 RX ADMIN — Medication 12.5 MG: at 20:54

## 2024-03-17 RX ADMIN — HEPARIN SODIUM 5000 UNITS: 5000 INJECTION, SOLUTION INTRAVENOUS; SUBCUTANEOUS at 17:47

## 2024-03-17 RX ADMIN — Medication 5 MG: at 23:27

## 2024-03-17 RX ADMIN — PREDNISONE 5 MG: 5 TABLET ORAL at 07:51

## 2024-03-17 RX ADMIN — Medication 40 MG: at 20:54

## 2024-03-17 RX ADMIN — CYANOCOBALAMIN TAB 500 MCG 500 MCG: 500 TAB at 07:51

## 2024-03-17 RX ADMIN — CALCIUM CARBONATE 600 MG (1,500 MG)-VITAMIN D3 400 UNIT TABLET 1 TABLET: at 12:34

## 2024-03-17 RX ADMIN — Medication 40 MG: at 07:50

## 2024-03-17 RX ADMIN — Medication 5 ML: at 15:42

## 2024-03-17 RX ADMIN — HEPARIN SODIUM 5000 UNITS: 5000 INJECTION, SOLUTION INTRAVENOUS; SUBCUTANEOUS at 06:22

## 2024-03-17 RX ADMIN — LEVALBUTEROL HYDROCHLORIDE 1.25 MG: 1.25 SOLUTION RESPIRATORY (INHALATION) at 20:35

## 2024-03-17 RX ADMIN — LEVOTHYROXINE SODIUM 25 MCG: 0.03 TABLET ORAL at 06:22

## 2024-03-17 RX ADMIN — SODIUM CHLORIDE 1 MG/MIN: 9 INJECTION, SOLUTION INTRAVENOUS at 07:57

## 2024-03-17 RX ADMIN — ONDANSETRON 4 MG: 2 INJECTION INTRAMUSCULAR; INTRAVENOUS at 15:40

## 2024-03-17 RX ADMIN — SODIUM CHLORIDE 250 ML: 9 INJECTION, SOLUTION INTRAVENOUS at 00:26

## 2024-03-17 RX ADMIN — PREDNISONE 2.5 MG: 2.5 TABLET ORAL at 20:55

## 2024-03-17 RX ADMIN — Medication 2.5 MCG: at 07:51

## 2024-03-17 RX ADMIN — AMIODARONE HYDROCHLORIDE 150 MG: 1.5 INJECTION, SOLUTION INTRAVENOUS at 03:36

## 2024-03-17 RX ADMIN — Medication 2.5 MCG: at 20:54

## 2024-03-17 RX ADMIN — OLANZAPINE 5 MG: 5 TABLET, ORALLY DISINTEGRATING ORAL at 23:27

## 2024-03-17 RX ADMIN — CALCIUM CARBONATE 600 MG (1,500 MG)-VITAMIN D3 400 UNIT TABLET 1 TABLET: at 07:50

## 2024-03-17 RX ADMIN — FIDAXOMICIN 200 MG: 200 TABLET, FILM COATED ORAL at 20:54

## 2024-03-17 ASSESSMENT — ACTIVITIES OF DAILY LIVING (ADL)
ADLS_ACUITY_SCORE: 29

## 2024-03-17 NOTE — PLAN OF CARE
"/66   Pulse (!) 134   Temp 98.9  F (37.2  C) (Oral)   Resp 18   Ht 1.57 m (5' 1.81\")   Wt 46.2 kg (101 lb 13.6 oz)   SpO2 98%   BMI 18.74 kg/m      Shift events: Amio bolus and amio drip started for Aflutter RVR with rates 150-160. Rates improved to 110-130. BP stable. Denies CP and SOB. Adamantly refusing BIPAP.    Neuro: A&Ox4  CV: Aflutter rates 110-130s. BP stable.  Resp: 2 L NC overnight for intermittent desats while sleeping. Denies SOB.  GI: BM x1. Uses commode. PEG/J.  : Oliguric. HD on TTS.  Diet/nutrition: Renal diet. TF at 35 mL/hr goal. Cyclic TPN overnight; should discontinue today per primary team note.  Skin: No new deficits noted.  Activity: SBA.  LDA: PICC. PEG/J.  Gtts: Amio at 1 mg/min. TPN at 58 mL/hr.    Plan: Continue to monitor CV status.        "

## 2024-03-17 NOTE — PLAN OF CARE
Goal Outcome Evaluation:      Plan of Care Reviewed With: patient    Overall Patient Progress: no changeOverall Patient Progress: no change    Outcome Evaluation: New Aflutter development. Pulse 150's sustained. Pt denied feeling symptomatic. Provider notified and came to bedside to assess pt. EKG completed. Metoprolol administered with minimal response. Pulse currently 140's sustained. Magnesium infusing. Continue to monitor cardiac status. Loose BM x3. Tube feeding at goal rate. TPN infusing per orders. Ate well this evening. On calorie counts. Pt agreeable to wearing bipap. Requests zyprexa be given at 2330 and bipap applied around midnight.

## 2024-03-17 NOTE — PROVIDER NOTIFICATION
Provider notified (name): JAMIA More via [employer]  Reason for notification: Pulse back in the 140's after metoprolol administration. Remains asymptomatic.   Recommendation/request given to provider:  Response from provider: Provider returned page via [employer] and will follow up shortly.     Oncoming RN updated.

## 2024-03-17 NOTE — PROGRESS NOTES
"[facility]  Neurology Consultation - Progress Note    Patient Name:  Sofie Rodriguez  Date of Service:  March 17, 2024    Subjective:    Awake and alert this morning in no apparent distress.  Says \"I'm not sure why people think my breathing is wrong, I don't feel short of breath at all.\"      She first noticed some numbness and tingling in the soles of the feet bilaterally in December.  She thinks this may have progressed to some extent to involve more of the sole of her foot, though she is confident that there is no involvement of either numbness or tingling anywhere above the ankles bilaterally.  She denies numbness or tingling of the hands or fingers.    Objective:    Vitals: /66   Pulse (!) 134   Temp 98.9  F (37.2  C) (Oral)   Resp 18   Ht 1.57 m (5' 1.81\")   Wt 46.2 kg (101 lb 13.6 oz)   SpO2 98%   BMI 18.74 kg/m    General: Lying in bed, NAD  Head: Atraumatic, normocephalic   Cardiac: no lower extremity edema  Neurologic:  Mental status: Awake, alert, attentive, oriented to self, time, place, and circumstance. Language is fluent and coherent with intact comprehension of complex commands, naming and repetition.  Cranial nerves: VFF, PERRL, conjugate gaze, EOMI without decrement with holding upward gaze, facial sensation intact, face symmetric, shoulder shrug strong, tongue/uvula midline, no dysarthria. No diplopia or ptosis with sustained upward gaze.   Motor: Decreased muscle bulk, but normal tone.  5/5 strength bilaterally in deltoids, biceps, triceps, hand , hip flexors, hip extensors, knee flexion, knee extension, plantarflexion, dorsiflexion.  Strength with neck flexion/extension 5/5.  Able to count to 20 in a single breath  Reflexes: Normo-reflexic and symmetric biceps, brachioradialis, patellae, unable to elicit achilles. Negative Lugo, no clonus, toes up-going.  Sensory:   Reduced sensation to vibration at the toes, normalizing at the ankles.  She " has 2 to 3 seconds vibratory sensation at the toes, 10 seconds at the bilateral ankles.  Vibratory sensation greater than 16 seconds at the bilateral index fingers.  Decree sensation to temperature at the feet bilaterally, normalizing at the ankles.  Normal sensation to temperature and pinprick in the hands.  Coordination: FNF and HS without ataxia or dysmetria.  Gait: Not assessed    Pertinent Investigations:    I have personally reviewed most recent and pertinent labs, tests, and radiological images.     Assessment  Sofie Rodriguez is a 63 year old female with history of COPD status post bilateral lung transplant in June 2022 complicated by hemidiaphragm palsy and recurrent pneumonias, gastroparesis and small bowel dysmotility now status post PEG/J tube, end-stage renal disease on intermittent hemodialysis who was admitted for failure to thrive who has had persistent hypercarbia with low NIF and FVC.  Neurology service was asked to evaluate for possible neuromuscular etiology of her respiratory acidosis.  Possible neurologic conditions that can cause respiratory acidosis include ALS, myasthenia gravis, peripheral neuropathies, myopathies.     Patient's exam is overall very reassuring and was essentially unremarkable.  All 4 extremities were strong, she had no decrement with sustained muscle activation.  There was conjugate gaze, no diplopia, and no ptosis on sustained upgaze.  There were no fasciculations or upper motor neuron signs.  Sensation was intact.  Her muscles were nontender.      Given her reassuring exam and negative family history for the conditions outlined above, feel that neuromuscular etiology for her hypercarbia and respiratory acidosis is exceedingly unlikely.  Her previous workup has included an MRI to evaluate for central nervous system lesion that could explain respiratory drive depression and this was also negative.  Her respiratory acidosis is more likely to be related to her multiple  pulmonary comorbidities including recurrent pneumonias, lung transplant hemidiaphragm palsy, and inability to consistently tolerate BiPAP.     Recommendations  -No obvious clinical history or exam findings to suggest neurologic/neuromuscular causes of respiratory weakness  -Neuropathy labs currently pending    Neurology will sign off at this time, please call if any new questions arise    Thank you for involving Neurology in the care of Sofie Rodriguez.  Please do not hesitate to call with questions/concerns (consult pager [phone]).      Patient was seen and discussed with Dr. Baron.    Nathen Ga,   Resident Physician, PGY-3  Department of Neurology    Dragon disclaimer: This documentation was completed with the aid of dictation software.  Please note that there may be some inconsistencies due to software errors.  Errors are corrected in real time, however if there is a remaining error, please do not hesitate to reach out for clarification.

## 2024-03-17 NOTE — PROGRESS NOTES
Transferred to: 6B  Reason for transfer: new aflutter with HR 140s-150s; amiodarone gtt  Report given to: RIYA Hernandez  Belongings sent with: yes    Assumed cares [id number]  Patient alert and oriented, on RA at time of transfer. Tele reading aflutter with HR 140s-150s. Given 1 dose of IV metoprolol, 250 ml bolus NS as well as completed Mg infusion with no affect on HR. Patient was asymptomatic with no reports of chest pain or lightheadedness. Wore BIPAP around 1.5 hrs intermittently. TPN infusing through PICC. TF running at 35 mls/hr which is goal. Transfer to 6B for amiodarone gtt, report given to RIYA Hernandez.

## 2024-03-17 NOTE — PROGRESS NOTES
Calorie Count  Intake recorded for: 3/16  Total Kcals: 1,294 Total Protein: 36g  Kcals from Hospital Food: 854   Protein: 29g  Kcals from Outside Food (average):440 Protein: 7g  # Meals Ordered from Kitchen: 2  # Meals Recorded: 3 meals recorded  Meal 1: 100% one pancake with 3 syrup, 2 nino slices  Meal 2: 100% deli sandwich with white bread, cheese, and turkey  Meal 3: 100% reeses 7 pieces, 1oz white cheddar popcorn, 1oz cheetos bag, popsicle  # Supplements Recorded: No supplements recorded

## 2024-03-17 NOTE — PLAN OF CARE
"/51 (BP Location: Right arm)   Pulse (!) 123   Temp 98.5  F (36.9  C) (Oral)   Resp 18   Ht 1.57 m (5' 1.81\")   Wt 46.2 kg (101 lb 13.6 oz)   SpO2 97%   BMI 18.74 kg/m      Hours of Care: 4436-2519.    Neuro: AO x 4.  Vitals: VSS.   Respiratory: On 1L NC while awake and on 2-4L NC while sleeping.  Cardiac: In A. Fib with rates in the 120s. 140's with activity.   GI/: Anuric. Several loose stools.  Skin/Wounds: No new deficits noted.  Lines: DL PICC  Diet: Renal.  Activity: Up with SBA.  Pain: denies.      Continue to monitor and follow POC    Nathen Agustin RN on 3/17/2024 at 6:28 PM    6B-Intermediate Care           "

## 2024-03-17 NOTE — PROGRESS NOTES
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 On 2/17/24 she was transferred to ICU for worsening mental status and acute hypoxic and hypercarbic respiratory failure inspite of BiPAP requiring intubation. She was suspected to have PNA and started on antibiotics. Extubated on 2/19 without complications and tolerated iHD on 2/20, and tolerated PO regular diet in addition to TPN. Developed progressively worsening respiratory acidosis and hypercarbia, and was re-admitted to ICU on 2/28/24 for close monitoring of intermittent BiPAP and possible intubation, however she improved on AVAPS setting and well enough to transfer back to medicine floor on 2/29/24. Currently working on balancing volume status with current TPN plan, improving ventilation, and GOC/disposition planning.       Changes today:   -New Aflutter overnight -150s (placed on amiodarone drip)  -No Bipap used overnight- NC 2L O2 reported  -Neuro consulted--no neuromuscular component contributing to breathing  -Labs for neuropathic pain pending  -Dialysis yesterday  -TPN discontinued today    #A-fib, A-flutter  (recurrent 3/17)  Noted atrial fibrillation on arrival with HR elevated at 150, not sustained for > 10 minutes and otherwise hemodynamically stable. RVR resolved w/o medications.  PTA metoprolol (25mg BID) has been held through much of admission. On 3/17 new Aflutter developed overnight. HR 150s with flutter waves on EKG.. Metoprolol administered along with Mg infusion with minimal response. Patient was then given amiodarone bolus and placed on drip which now slightly lowered her HR in 120-130s.  - PTA metoprolol 25mg BID had been held, lower dose (12.5 BID) restarted 3/16 with recurrent a fib  - Continuous telemetry  - Amio at 0.5 mg/min.     #Severe respiratory acidosis, improved on BiPAP  #AHRF (resolved)  #Acute hypercarbic respiratory failure  #S/P Lung transplant 2022 c/b right hemidiaphragm palsy  #Recurrent pneumonia, resolved  Patient received a bilateral lung transplant 6/28/22 for COPD. Was admitted 2/10 to address malnutrition and admitted to ICU on 2/17 with hypoxic hypercarbic respiratory failure. Intubated on 2/18 AM  due to worsening oxygen requirements, likely due to pulmonary edema given hx of ESRD requiring HD vs infection given hx of lung transplant, immunosuppressed status, and recurrent pneumonias. Extubated on 2/19 without complications. Has had issues with rising pCO2 that is responsive to BiPAP, but due to refusal, has required transferred to ICU (on 2/28 AM). Neurology consulted and MRI r/o central cause problem for respiratory drive. Started on AVAPS with improvement in mental status and VBG, and patient transferred back to medicine floor on 2/29. Patient has been using BiPAP but gets claustrophobia while using it. Trial low dose olanzapine.  - PRN hypertonic saline inhaler with albuterol nebs  - Transplant pulmonology following, recs appreciated  - PTA immunosuppressive agent  >Prednisone 5 mg every morning, 2.5 mg every afternoon; Stop tacrolimus 3/10 and replaced with Cyclosporin 200mg  BID (3/11-*)    > overnight oximetry study suggestive of O2 vs CPAP need, as did require up to 2LPM overnight to prevent hypoxia  > Try to minimize O2 to preserve respiratory drive, will give IVIG for DSA+   - avoid HFNC, maintain minimum oxygen to keep SaO2 >85%   - continue AVAPS when sleeping (overnight and during naps)  - MIP/MEP testing significantly decreased from previous--> consult Neuro; No neuromuscular cause of pCO2 retention  - Stopped PTA dapsone MWF for PJP prophylaxis; replaced with Bactrim (3/11)    - Limit medications that would depress respiration   - d/c'd theophylline 3/3/24 due to difficulty attaining therapeutic levels (started by ICU)  - continue thyroid function as below  - repeat metabolic CART study when stable on enteral feeds, off TPN (per TxPulm)  - sleep clinic eval at discharge     Antibiotics:  Vancomycin (2/17 - 2/17)  Zosyn (2/17 - 2/23) for HAP  Bactrim MWF, PJP ppx (previously on Dapsone)  Micafungin (2/18- 2/21)  Fidaxomicin (3/13-*3/22)     Immunosuppression  Cyclosporin (previously on Tacro)  Prednisone     #Severe malnutrition   #FTT   #Hypoalbuminemia  #Gastroparesis, small bowel dysmotility  #S/P PEG/J with intolerance of enteral nutrition  # Chronic osmotic diarrhea/SIBO s/p Rifaximin  #Recurrent C.Diff (3rd ep)    Patient with gastroparesis (presumed due to vagal injury) and small bowel dysmotility complicated by unintentional 40lb weight loss over the past year and now severe malnutrition. Previously intolerant to oral food intake due to nausea. Was initially admitted for portacath and TPN initiation since 2/13. After extubation has been able to tolerate feeds without n/v from 2/20. Electrolytes trending towards baseline today.  Per GI, next steps for workup for malnutrition would include CT enterography to evaluate for anatomic abnormalities contributing to malnutrition vs possible treatment for SIBO (though patient has had multiple courses of treatment in the past with  no long term improvement). Patient couldn't tolerate the oral load for CT enterography on 2/21, so will defer this until she is able to tolerate greater PO load. On 2/23 , again discussed with patient the need of small bowel motility study if not improving outpatient through [place]. No ongoing concerns for SIBO on 2/23 as patient is passing multiple episodes of stools and gas with no abdominal pain or distention. C-diff test negative on 2/21. Per RD, patient tolerating >300 kcal of intake PO currently, so stopped trickle Tube feeds and continuing with PO and TPN for nutrition (sufficient for preventing gut atrophy).  Tunneled CVC removed on 3/7 without complications, after PICC placement. As of 3/11 transplant pulmonology is worried that TPN is not a realistic plan to continue at home and would like to transition back to TF (RD oncerned pt is not absorbing any oral intake). Transplant pulm aslo worried that her tacro and dapson could be contributing to diarrhea (mucosal toxicity). Repeat enteric studies showed recurrent C.diff;  Fidaxomicin x 10 days started (GI approved). Transplant pulm still requesting repeat colonoscopy w/ Bx after completion of c.diff treatment, to  r/o toxicity or other reason that diarrhea is present.  - Regular diet +  Restarted tube feeds- (with goal of 40 mL/hr) per transplant pulm request   -Nutrition consulted, they have discussed plan with GI; TPN discontinued 3/16  - GI consulted/signed off; appreciate recs   -PO Fidaxomicin 200 mg BID for 10 days- diarrhea has improved since start of tx    -May reconsult GI after tx of C.diff for future colonoscopy if diarrhea returns   - May benefit from small bowel motility study if not improving outpatient through [place].   - Nephrology consulted; appreciate recs     #B/L Neuropathic Pain   New pain b/l over the past few days. Last A1c <4.2 (7/11/23). End stage renal dz can cause it too, but will check for vitamin/mineral deficiencies. Medications can  also be a culprit, and unsure if  fidaxomicin  has a side effect of neuropathy. Will evaluate more. TSH wnl. B12 elevated. B6, copper, B1, B3, zinc pending   -Consider gabapentin in the coming days   -Neuro Recs:  -No obvious clinical history or exam findings to suggest neurologic/neuromuscular causes of respiratory weakness  -Neuropathy labs currently pending    #Acute on chronic anemia 2/2 ESRD  Hgb stable 7-8s, with no acute signs of bleeding. Aute drop 2/29 from 8.2 > 6.6 after two rechecks, no overt signs of bleeding; required 1U pRBC transfusion.    - continue epogen per nephrology with dialysis  - venofer 50 mcg qweek with dialysis     #ESRD on HD  #Hypervolemic hyponatremia  #Anion gap metabolic acidosis - resolved  #mild hyperphosphatemia  Patient is ESRD on T/Th/Sat HD as outpt, Hgb stabilizing. HD tolerating well. Due to TPN vol, pt requires 4x/wk dialysis (2hr UF on M; HD T/TH/Sat 3hrs). This new frequency of dialysis does make it challenging for placement or going home. Patient transitioned off TPN, able to return to three-day a week scheduling per nephrology.  - Continue Venofer injections    - CBC and CMP daily  - Continue epogen dose 8000 units as per nephrology  - Strict I/Os  - HD per nephrology given hypervolemia; Plan for TTS HD    # Elevated TSH and low T4 and T3  Patient with new low T4 at 0.64 and TSH at 6.5, concerning for new hypothyroidism vs sick thyroid syndrome. TSH with appropriate response, more consistent with elevation in the setting of acute illness. ICU team on 2/28 recommended initiation of treatment for possible hypothyroid as this can improve respiratory muscle function in the setting of weakness and malnutrition.   - continue liothyronine and levothyroxine per ICU team recommendations  - repeat thyroid function 3/7 wnl    #Left upper extremity unilateral edema, improving   #Bilateral pedal and ankle edema, improving  Edema due to third spacing due to hypoalbuminemia vs HF. BNP  >39663 at admission, Echo on 2/18 shows EF of 55-60% with collapsible IVC. Extremity edema 2/2 to hypoalbuminemia. Upper limb duplex USG on 2/18 negative for DVT.  USG left arm on 2/21 shows steel physiology and Vascular Surgery consulted (see below). Overall edema in extremities have improved significantly since initiating 4x/wk dialysis.    - Continue daily weights  - Strict I/Os  - Elevation and wrapping of only lower legs as needed and increased UF per nephrology for volume management; no wrapping of Left upper extremity with dialysis fistula  - lymphedema consulted for BLE edema  - HD as noted above      #Steal physiology of LUE dialysis access fistula  LUE arterial US obtained 2/21 with concern for LUE edema. US of fistula demonstrated steal physiology. Discussed with nephrology, and vascular surgery consulted on 2/22. Vascular surgery has only minor concerns for steal symptoms and given that the AVF is working well, they are okay with outpatient fistulograms/ venoplasty with wrist brachial index and PPG's, unless new concerns arise.  - plan for outpatient workup as above; nephrology in agreement with outpatient workup.     #Facial and neck bruising  #Pain  Reports soreness in her neck from lying in bed. No soreness in the buttocks/ back. Patient has multiple bruises over her arms and face which is attributed to previous falls. No new bruising reported today. Patients reports her headaches are improving with tylenol. Using heating pads and lidocaine patch for body pains. Couple of small skin tears noted by the Rn's. Skin care done accordingly.  - Tylenol Q4  - Lidocaine patch prn  - Heating pads prn  - Diligent skin cares     #Encephalopathy secondary to hypercarbia - resolved  Patient was progressively more lethargic on 2/17 during admission in [facility]. Etiology likely secondary to hypercarbia in context of respiratory failure as patient was noted to have high CO2s concomitant with lethargy. Though  receiving oxycodone and fentanyl, lethargy was only partially alleviated by narcan. LFTs and ammonia wnl with low suspicion of hepatic encephalopathy. BUN wnl with low suspicion for uremic encephalopathy. Head CT on 2/18 was negative with low suspicion of acute intracranial pathology. Patient is awake, alert, oriented and following commands since 2/20.     # Right hip fracture s/p ORIF (December 2023)   - PT/OT    # GERD  - PTA PPI    # Hx EBV viremia   # Hypogammaglobulinemia  # Chronic immunosuppression 2/2 lung transplant  Patient has been afebrile and has not had leukocytosis however she is under immunosuppression. Given acute respiratory failure and hx of recurrent pneumonias, she was started on empiric abx for concerns over new pneumonia. RVP and cultures no growth to date. Last day of empirical treatment for HAP.  - EBV 27K (decreased compared to prior)     # RLE edema and pain - U/S DVT without DVT    #[facility]  Care conference on 3/15 with Transplant Pulm, [facility], Medicine, daughters (Julia Nash) and Transplant SW. Overall medical updates provided and Qs answered. NIF/FVC has worsened but does not appear to be neuromuscular.  CO2 retention and nutrition remain the biggest concerns, pulmonology to work on new AVAPs mask, primary team will work on transitioning back to tube feeds. Likely would be able to discharge home later this week.      Lines/tubes/drains:  - PIV x2  - PEG/J  - HD AV fistula, left arm   - PICC for TPN        Diet: Renal Diet (dialysis)  Adult Formula Drip Feeding: Continuous Tynt Renal Support; Jejunostomy; Goal Rate: begin TF with [employer] renal support, Initiate @ 15 ml/hr and advance by 10 ml Q 24 hr as tolerated to goal @ 35 ml/hr.  Do not start or advance unless K+ >3...  parenteral nutrition - ADULT compounded formula CYCLE    DVT Prophylaxis: resume subQ heparin  Dong Catheter: Not present  Fluids:  PO  Lines: PRESENT      PICC 03/04/24 Double Lumen Right Basilic TPN. PICC  okay to use.-Site Assessment: WDL  Hemodialysis Vascular Access Arteriovenous graft Superior Arm-Site Assessment: WDL;Bruit present;Thrill present      Cardiac Monitoring: ACTIVE order. Indication: Tachyarrhythmias, acute (48 hours)  Code Status: Full Code      Clinically Significant Risk Factors            # Hypomagnesemia: Lowest Mg = 1.6 mg/dL in last 2 days, will replace as needed   # Hypoalbuminemia: Lowest albumin = 2.6 g/dL at 2/18/2024  5:13 AM, will monitor as appropriate             # Severe Malnutrition: based on nutrition assessment      # Financial/Environmental Concerns: none         Disposition Plan    Patient seen and discussed with Dr. Frantz Fishman DDS  FS Resident on [address]  Securely message with [employer] info)  Text page via [facility] Paging/Directory   See signed in provider for up to date coverage information  ______________________________________________________________________    Interval History   New Aflutter developed overnight. Pulse 140-150's sustained. Given 1 dose of IV metoprolol, 250 ml bolus NS as well as completed Mg infusion with no affect on HR. Pt denied feeling symptomatic. Coverted to amiodarone drip 1mg/ min. No BiPAP overnight. Overall feeling well. Diarrhea improving. States her breathing feels fine and wonders why she needs the bipap when it makes her claustrophobic. Minimal BiPAP use overnight. Denies any other constitutional symptoms.     Physical Exam   Vital Signs: Temp: 97.9  F (36.6  C) Temp src: Axillary BP: 109/65 Pulse: 102   Resp: 18 SpO2: 99 % O2 Device: Nasal cannula Oxygen Delivery: 2 LPM  Weight: 101 lbs 13.64 oz  General: thin,sitting up in bed comfortably, no acute distress this morning. Very pleasant  HEENT: EOMI, NC in place   Pulm/Resp: breathing comfortably on RA, CTAB. No cough  CV: normal rate, regular rhythm. No LE edema  Neuro: alert and following  commands, answering questions clearly and easily, moving all extremities.    Medical Decision Making       Please see A&P for additional details of medical decision making.      Data     I have personally reviewed the following data over the past 24 hrs:    N/A  \   N/A   / N/A     141 103 53.4 (H) /  74   3.9 27 2.96 (H) \     ALT: 12 AST: 16 AP: 147 TBILI: 0.2   ALB: 3.5 TOT PROTEIN: 5.9 (L) LIPASE: N/A       Imaging results reviewed over the past 24 hrs:   No results found for this or any previous visit (from the past 24 hour(s)).

## 2024-03-17 NOTE — PROVIDER NOTIFICATION
Provider notified (name):PRAVEEN Gallego   via [employer]  Reason for notification: pulse 150's sustained, /69.  Pt denies feeling symptomatic.  Recommendation/request given to provider:  Response from provider: provider returned page via [employer]. EKG ordered.

## 2024-03-17 NOTE — PROGRESS NOTES
Transfer Note  Transferred from:   Via: Bed  Reason for transfer: New onset of a-flutter with RVR  Family: To be updated in AM by patient  Belongings: Received  Chart: Received  Medications: Received  Code Status verified on armband: Yes  2 RN Skin Assessment Completed By: David NAJERA and Sole DAS  Suction/Ambu bag/Flowmeter at bedside: Yes    Report received from: 7C RN  Pt status: Stable

## 2024-03-17 NOTE — PROGRESS NOTES
Nephrology Progress Note  03/17/2024         Assessment & Recommendations:   Sofie Rodriguez is a 63 year old year old female with ESKD, COPD s/p b/l lung transplant in 6/2022 with multiple complications, gastroparesis s/p GJ tube, GIB, chronic diarrhea, recurrent c-diff, FTT with inability to tolerate any tube feeds, R hip fx s/p ORIF 12/2023, admitted on 2/10 for initiation of TPN/lipids, transferred to ICU on 2/17 with worsening mental status and respiratory acidosis in spite of bipap, ultimately intubated on 2/18, being treated for PNA, also with volume overload in setting of high volume TPN. Persistent respiratory acidosis in spite of volume off, transferred to ICU for hypotension and respiratory failure, improved on bipap, now floor status again 3/1    # ESKD - TTS, LUE AVG, 3hr, 45.5kg (will be lowered), [facility], Dr. Andres Fairbanks. She has been losing weight and now even below her recent set EDW.  - With TPN condensed to 640 ml max per day, we can manage volume with HD 3x/week  - Continue HD on TTS schedule  - Requires EMLA cream an hour before HD  - please avoid PICC which will compromise future dialysis accesses; a midline is much preferred for this patient  - Dialysis per TTS schedule     # Dialysis access: LUE AVG placed 3-4 months ago, per pt. She had arm swelling and a fistulogram a couple months ago and swelling improved but now has redeveloped.  - US with c/f possible steal syndrome  - per vascular surgery, no concern for steal syndrome, ok to go ahead with fistulogram  - given that AVF is working well on dialysis (450 BFR) and at this time IR is only able to perform fistulograms when AVF isn't working well, will defer to OP for management.     # Persistent respiratory acidosis:    - pt doesn't tolerated bipap and often refuses to wear  - transplant pulm following    # Volume/BP: Anuric; on metoprolol soln 25 mg bid (held)  - gently challenging EDW as able, will achieve 41 kg today  -  "please record TPN in I/O's (not being recorded most days)  - appreciate condensing TPN (currently 400-600 ml per day)     # Nutrition: on TPN and regular diet  - per team, concern is that pt isn't absorbing much PO or tube feeds with diarrhea increasing significantly with increase in tube feeds; thus needing TPN     # Anemia 2/2 ESKD  On Venofer 50 qwk, Mircera last dose 1/9/2024  - hgb 9's --> 10.0 yesterday   - Will continue Venofer 50 mcg q week (Tuesday)  - continue epogen 8000 units via HD    # BMD:   - Ca 8.8, phos 3.7, alb 3.5  - sevelamer on hold       Recommendations were communicated to primary team via note     Joaquina Dennis DO   Division of Renal Disease and Hypertension      Interval History :   Seen at bedside in room. Working on word search puzzle. Doing well without complaint. Breathing feels fine, currently on 1L NC with sats 100%.     Review of Systems:   4 point ROS neg other than as noted above    Physical Exam:   I/O last 3 completed shifts:  In: 3036 [P.O.:375; I.V.:200; NG/GT:360]  Out: 2500 [Other:2500]   /59 (BP Location: Right arm)   Pulse 117   Temp 98.2  F (36.8  C) (Oral)   Resp 18   Ht 1.57 m (5' 1.81\")   Wt 46.2 kg (101 lb 13.6 oz)   SpO2 100%   BMI 18.74 kg/m       GENERAL APPEARANCE: sitting up in bed, no acute distress  PULM: CTAB, unlabored on 1L NC  CV: regular rhythm, tachycardic to low 100s     no edema in LEs  GI: soft   INTEGUMENT: no cyanosis, no rashes on exposed skin  NEURO: alert and oriented, no focal deficit observed  Access Left AVG     Labs:   All labs reviewed by me  Electrolytes/Renal -   Recent Labs   Lab Test 03/17/24  0328 03/16/24  2045 03/16/24  0800 03/16/24  0651 03/15/24  0554    137  --  137 137   POTASSIUM 3.9 4.2  --  4.5 4.0   CHLORIDE 103 100  --  101 100   CO2 27 26  --  24 26   BUN 53.4* 38.9*  --  79.4* 47.2*   CR 2.96* 2.52*  --  3.90* 2.72*   GLC 74 114* 115* 104* 110*   ESTUARDO 8.8 8.6*  --  8.9 8.7*   MAG 2.2 1.6*  --  1.9 1.7 "   PHOS 3.7  --   --  5.4* 3.5       CBC -   Recent Labs   Lab Test 03/16/24  0651 03/14/24  0553 03/12/24  0613   WBC 7.7 6.2 6.3   HGB 10.0* 9.6* 9.7*    212 227       LFTs -   Recent Labs   Lab Test 03/17/24  0328 03/16/24  0651 03/15/24  0554   ALKPHOS 147 121 117   BILITOTAL 0.2 0.3 0.3   ALT 12 <5 8   AST 16 17 17   PROTTOTAL 5.9* 5.9* 5.9*   ALBUMIN 3.5 3.4* 3.6       Iron Panel -   Recent Labs   Lab Test 09/26/22  0555 09/03/22  1039 08/24/22  0810   IRON 54 21* 41   IRONSAT 22 9* 21   CARLOS 769* 343* 334*         Imaging:  All imaging studies reviewed by me.     Current Medications:   calcium carbonate-vitamin D  1 tablet Per J Tube TID w/meals    cyanocobalamin  500 mcg Per Feeding Tube Daily    cycloSPORINE modified  200 mg Per G Tube BID IS    fidaxomicin  200 mg Oral BID    heparin ANTICOAGULANT  5,000 Units Subcutaneous Q12H    heparin lock flush  5-20 mL Intracatheter Q24H    levalbuterol  1.25 mg Nebulization 2 times daily    levothyroxine  25 mcg Oral QAM AC    lidocaine  1 patch Transdermal Q24h    liothyronine  2.5 mcg Oral BID    [Held by provider] metoprolol  25 mg Per J Tube BID    metoprolol tartrate  12.5 mg Oral or Feeding Tube BID    OLANZapine zydis  5 mg Oral At Bedtime    pantoprazole  40 mg Per J Tube BID    predniSONE  5 mg Per J Tube QAM    And    predniSONE  2.5 mg Per J Tube QPM    [Held by provider] sevelamer carbonate (RENVELA)  0.8 g Oral BID    sodium chloride (PF)  10 mL Intracatheter Q8H    sodium chloride (PF)  10-40 mL Intracatheter Q8H    sulfamethoxazole-trimethoprim  1 tablet Oral Q Mon Wed Fri AM      amiodarone 0.5 mg/min (03/17/24 0758)    dextrose      - MEDICATION INSTRUCTIONS -      - MEDICATION INSTRUCTIONS -      sodium chloride 0.9%       Joqauina Dennis DO    I spent 35 minutes on this date of encounter reviewing chart, evaluating patient, formulating plan of care, and documenting.

## 2024-03-18 ENCOUNTER — APPOINTMENT (OUTPATIENT)
Dept: CT IMAGING | Facility: CLINIC | Age: 64
DRG: 640 | End: 2024-03-18
Payer: MEDICARE

## 2024-03-18 ENCOUNTER — APPOINTMENT (OUTPATIENT)
Dept: GENERAL RADIOLOGY | Facility: CLINIC | Age: 64
DRG: 640 | End: 2024-03-18
Attending: NURSE PRACTITIONER
Payer: MEDICARE

## 2024-03-18 LAB
ALBUMIN SERPL BCG-MCNC: 3.6 G/DL (ref 3.5–5.2)
ALP SERPL-CCNC: 158 U/L (ref 40–150)
ALT SERPL W P-5'-P-CCNC: <5 U/L (ref 0–50)
ANION GAP SERPL CALCULATED.3IONS-SCNC: 13 MMOL/L (ref 7–15)
ANION GAP SERPL CALCULATED.3IONS-SCNC: 15 MMOL/L (ref 7–15)
ANION GAP SERPL CALCULATED.3IONS-SCNC: 16 MMOL/L (ref 7–15)
AST SERPL W P-5'-P-CCNC: 19 U/L (ref 0–45)
BASE EXCESS BLDV CALC-SCNC: -2.7 MMOL/L (ref -3–3)
BASE EXCESS BLDV CALC-SCNC: -4.1 MMOL/L (ref -3–3)
BASE EXCESS BLDV CALC-SCNC: -4.4 MMOL/L (ref -3–3)
BASE EXCESS BLDV CALC-SCNC: 0.2 MMOL/L (ref -3–3)
BASE EXCESS BLDV CALC-SCNC: 0.5 MMOL/L (ref -3–3)
BASOPHILS # BLD AUTO: ABNORMAL 10*3/UL
BASOPHILS # BLD MANUAL: 0 10E3/UL (ref 0–0.2)
BASOPHILS NFR BLD AUTO: ABNORMAL %
BASOPHILS NFR BLD MANUAL: 0 %
BILIRUB SERPL-MCNC: 0.3 MG/DL
BUN SERPL-MCNC: 58.2 MG/DL (ref 8–23)
BUN SERPL-MCNC: 88.2 MG/DL (ref 8–23)
BUN SERPL-MCNC: 97.5 MG/DL (ref 8–23)
C PNEUM DNA SPEC QL NAA+PROBE: NOT DETECTED
CALCIUM SERPL-MCNC: 8.7 MG/DL (ref 8.8–10.2)
CALCIUM SERPL-MCNC: 8.8 MG/DL (ref 8.8–10.2)
CALCIUM SERPL-MCNC: 9.3 MG/DL (ref 8.8–10.2)
CHLORIDE SERPL-SCNC: 100 MMOL/L (ref 98–107)
CHLORIDE SERPL-SCNC: 101 MMOL/L (ref 98–107)
CHLORIDE SERPL-SCNC: 99 MMOL/L (ref 98–107)
CK SERPL-CCNC: 22 U/L (ref 26–192)
CMV DNA SPEC NAA+PROBE-ACNC: NOT DETECTED IU/ML
COPPER SERPL-MCNC: 56.3 UG/DL
CREAT SERPL-MCNC: 3.12 MG/DL (ref 0.51–0.95)
CREAT SERPL-MCNC: 4.26 MG/DL (ref 0.51–0.95)
CREAT SERPL-MCNC: 4.6 MG/DL (ref 0.51–0.95)
CRP SERPL-MCNC: 25.3 MG/L
CYCLOSPORINE BLD LC/MS/MS-MCNC: 140 UG/L (ref 50–400)
DEPRECATED HCO3 PLAS-SCNC: 21 MMOL/L (ref 22–29)
DEPRECATED HCO3 PLAS-SCNC: 25 MMOL/L (ref 22–29)
DEPRECATED HCO3 PLAS-SCNC: 27 MMOL/L (ref 22–29)
EGFRCR SERPLBLD CKD-EPI 2021: 10 ML/MIN/1.73M2
EGFRCR SERPLBLD CKD-EPI 2021: 11 ML/MIN/1.73M2
EGFRCR SERPLBLD CKD-EPI 2021: 16 ML/MIN/1.73M2
EOSINOPHIL # BLD AUTO: ABNORMAL 10*3/UL
EOSINOPHIL # BLD MANUAL: 0.1 10E3/UL (ref 0–0.7)
EOSINOPHIL NFR BLD AUTO: ABNORMAL %
EOSINOPHIL NFR BLD MANUAL: 1 %
ERYTHROCYTE [DISTWIDTH] IN BLOOD BY AUTOMATED COUNT: 18.4 % (ref 10–15)
ERYTHROCYTE [DISTWIDTH] IN BLOOD BY AUTOMATED COUNT: 18.5 % (ref 10–15)
FLUAV H1 2009 PAND RNA SPEC QL NAA+PROBE: NOT DETECTED
FLUAV H1 RNA SPEC QL NAA+PROBE: NOT DETECTED
FLUAV H3 RNA SPEC QL NAA+PROBE: NOT DETECTED
FLUAV RNA SPEC QL NAA+PROBE: NEGATIVE
FLUAV RNA SPEC QL NAA+PROBE: NOT DETECTED
FLUBV RNA RESP QL NAA+PROBE: NEGATIVE
FLUBV RNA SPEC QL NAA+PROBE: NOT DETECTED
GLUCOSE BLDC GLUCOMTR-MCNC: 116 MG/DL (ref 70–99)
GLUCOSE BLDC GLUCOMTR-MCNC: 165 MG/DL (ref 70–99)
GLUCOSE BLDC GLUCOMTR-MCNC: 93 MG/DL (ref 70–99)
GLUCOSE SERPL-MCNC: 108 MG/DL (ref 70–99)
GLUCOSE SERPL-MCNC: 153 MG/DL (ref 70–99)
GLUCOSE SERPL-MCNC: 181 MG/DL (ref 70–99)
HADV DNA SPEC QL NAA+PROBE: NOT DETECTED
HBV SURFACE AB SERPL IA-ACNC: 69.7 M[IU]/ML
HBV SURFACE AB SERPL IA-ACNC: REACTIVE M[IU]/ML
HBV SURFACE AG SERPL QL IA: NONREACTIVE
HCO3 BLDV-SCNC: 27 MMOL/L (ref 21–28)
HCO3 BLDV-SCNC: 28 MMOL/L (ref 21–28)
HCO3 BLDV-SCNC: 29 MMOL/L (ref 21–28)
HCO3 BLDV-SCNC: 31 MMOL/L (ref 21–28)
HCO3 BLDV-SCNC: 31 MMOL/L (ref 21–28)
HCOV PNL SPEC NAA+PROBE: NOT DETECTED
HCT VFR BLD AUTO: 32.9 % (ref 35–47)
HCT VFR BLD AUTO: 37.8 % (ref 35–47)
HGB BLD-MCNC: 10.7 G/DL (ref 11.7–15.7)
HGB BLD-MCNC: 9.5 G/DL (ref 11.7–15.7)
HMPV RNA SPEC QL NAA+PROBE: NOT DETECTED
HOLD SPECIMEN: NORMAL
HPIV1 RNA SPEC QL NAA+PROBE: NOT DETECTED
HPIV2 RNA SPEC QL NAA+PROBE: NOT DETECTED
HPIV3 RNA SPEC QL NAA+PROBE: NOT DETECTED
HPIV4 RNA SPEC QL NAA+PROBE: NOT DETECTED
IMM GRANULOCYTES # BLD: ABNORMAL 10*3/UL
IMM GRANULOCYTES NFR BLD: ABNORMAL %
INR PPP: 1.1 (ref 0.85–1.15)
LYMPHOCYTES # BLD AUTO: ABNORMAL 10*3/UL
LYMPHOCYTES # BLD MANUAL: 0.9 10E3/UL (ref 0.8–5.3)
LYMPHOCYTES NFR BLD AUTO: ABNORMAL %
LYMPHOCYTES NFR BLD MANUAL: 9 %
M PNEUMO DNA SPEC QL NAA+PROBE: NOT DETECTED
MAGNESIUM SERPL-MCNC: 2 MG/DL (ref 1.7–2.3)
MAGNESIUM SERPL-MCNC: 2.4 MG/DL (ref 1.7–2.3)
MCH RBC QN AUTO: 35.7 PG (ref 26.5–33)
MCH RBC QN AUTO: 35.7 PG (ref 26.5–33)
MCHC RBC AUTO-ENTMCNC: 28.3 G/DL (ref 31.5–36.5)
MCHC RBC AUTO-ENTMCNC: 28.9 G/DL (ref 31.5–36.5)
MCV RBC AUTO: 124 FL (ref 78–100)
MCV RBC AUTO: 126 FL (ref 78–100)
MONOCYTES # BLD AUTO: ABNORMAL 10*3/UL
MONOCYTES # BLD MANUAL: 1 10E3/UL (ref 0–1.3)
MONOCYTES NFR BLD AUTO: ABNORMAL %
MONOCYTES NFR BLD MANUAL: 10 %
MRSA DNA SPEC QL NAA+PROBE: NEGATIVE
MYELOCYTES # BLD MANUAL: 0.3 10E3/UL
MYELOCYTES NFR BLD MANUAL: 3 %
NEUTROPHILS # BLD AUTO: ABNORMAL 10*3/UL
NEUTROPHILS # BLD MANUAL: 7.8 10E3/UL (ref 1.6–8.3)
NEUTROPHILS NFR BLD AUTO: ABNORMAL %
NEUTROPHILS NFR BLD MANUAL: 77 %
NRBC # BLD AUTO: 0 10E3/UL
NRBC BLD AUTO-RTO: 0 /100
O2/TOTAL GAS SETTING VFR VENT: 32 %
O2/TOTAL GAS SETTING VFR VENT: 35 %
O2/TOTAL GAS SETTING VFR VENT: 35 %
O2/TOTAL GAS SETTING VFR VENT: 40 %
O2/TOTAL GAS SETTING VFR VENT: 40 %
OXYHGB MFR BLDV: 81 % (ref 70–75)
OXYHGB MFR BLDV: 82 % (ref 70–75)
OXYHGB MFR BLDV: 84 % (ref 70–75)
OXYHGB MFR BLDV: 88 % (ref 70–75)
OXYHGB MFR BLDV: 90 % (ref 70–75)
PCO2 BLDV: 102 MM HG (ref 40–50)
PCO2 BLDV: 92 MM HG (ref 40–50)
PCO2 BLDV: 93 MM HG (ref 40–50)
PCO2 BLDV: 93 MM HG (ref 40–50)
PCO2 BLDV: 97 MM HG (ref 40–50)
PH BLDV: 7.05 [PH] (ref 7.32–7.43)
PH BLDV: 7.08 [PH] (ref 7.32–7.43)
PH BLDV: 7.08 [PH] (ref 7.32–7.43)
PH BLDV: 7.13 [PH] (ref 7.32–7.43)
PH BLDV: 7.14 [PH] (ref 7.32–7.43)
PHOSPHATE SERPL-MCNC: 4.8 MG/DL (ref 2.5–4.5)
PHOSPHATE SERPL-MCNC: 6.7 MG/DL (ref 2.5–4.5)
PLAT MORPH BLD: ABNORMAL
PLATELET # BLD AUTO: 175 10E3/UL (ref 150–450)
PLATELET # BLD AUTO: 213 10E3/UL (ref 150–450)
PO2 BLDV: 58 MM HG (ref 25–47)
PO2 BLDV: 59 MM HG (ref 25–47)
PO2 BLDV: 60 MM HG (ref 25–47)
PO2 BLDV: 72 MM HG (ref 25–47)
PO2 BLDV: 78 MM HG (ref 25–47)
POTASSIUM SERPL-SCNC: 4.3 MMOL/L (ref 3.4–5.3)
POTASSIUM SERPL-SCNC: 5.3 MMOL/L (ref 3.4–5.3)
POTASSIUM SERPL-SCNC: 5.4 MMOL/L (ref 3.4–5.3)
PREALB SERPL-MCNC: 24.8 MG/DL (ref 20–40)
PROCALCITONIN SERPL IA-MCNC: 0.92 NG/ML
PROT SERPL-MCNC: 6.1 G/DL (ref 6.4–8.3)
RBC # BLD AUTO: 2.66 10E6/UL (ref 3.8–5.2)
RBC # BLD AUTO: 3 10E6/UL (ref 3.8–5.2)
RBC MORPH BLD: ABNORMAL
RSV RNA SPEC NAA+PROBE: NEGATIVE
RSV RNA SPEC QL NAA+PROBE: NOT DETECTED
RSV RNA SPEC QL NAA+PROBE: NOT DETECTED
RV+EV RNA SPEC QL NAA+PROBE: NOT DETECTED
SA TARGET DNA: NEGATIVE
SAO2 % BLDV: 83.3 % (ref 70–75)
SAO2 % BLDV: 84.8 % (ref 70–75)
SAO2 % BLDV: 86.2 % (ref 70–75)
SAO2 % BLDV: 90.1 % (ref 70–75)
SAO2 % BLDV: 92.4 % (ref 70–75)
SARS-COV-2 RNA RESP QL NAA+PROBE: NEGATIVE
SODIUM SERPL-SCNC: 138 MMOL/L (ref 135–145)
SODIUM SERPL-SCNC: 139 MMOL/L (ref 135–145)
SODIUM SERPL-SCNC: 140 MMOL/L (ref 135–145)
TME LAST DOSE: NORMAL H
TME LAST DOSE: NORMAL H
WBC # BLD AUTO: 10.1 10E3/UL (ref 4–11)
WBC # BLD AUTO: 7.5 10E3/UL (ref 4–11)

## 2024-03-18 PROCEDURE — 99233 SBSQ HOSP IP/OBS HIGH 50: CPT | Mod: GC | Performed by: STUDENT IN AN ORGANIZED HEALTH CARE EDUCATION/TRAINING PROGRAM

## 2024-03-18 PROCEDURE — 71250 CT THORAX DX C-: CPT | Mod: 26 | Performed by: RADIOLOGY

## 2024-03-18 PROCEDURE — 99291 CRITICAL CARE FIRST HOUR: CPT

## 2024-03-18 PROCEDURE — 250N000013 HC RX MED GY IP 250 OP 250 PS 637

## 2024-03-18 PROCEDURE — 85007 BL SMEAR W/DIFF WBC COUNT: CPT | Performed by: STUDENT IN AN ORGANIZED HEALTH CARE EDUCATION/TRAINING PROGRAM

## 2024-03-18 PROCEDURE — 84134 ASSAY OF PREALBUMIN: CPT

## 2024-03-18 PROCEDURE — 87641 MR-STAPH DNA AMP PROBE: CPT

## 2024-03-18 PROCEDURE — 999N000157 HC STATISTIC RCP TIME EA 10 MIN: Performed by: STUDENT IN AN ORGANIZED HEALTH CARE EDUCATION/TRAINING PROGRAM

## 2024-03-18 PROCEDURE — 86140 C-REACTIVE PROTEIN: CPT

## 2024-03-18 PROCEDURE — 94640 AIRWAY INHALATION TREATMENT: CPT

## 2024-03-18 PROCEDURE — 71045 X-RAY EXAM CHEST 1 VIEW: CPT | Mod: 26 | Performed by: RADIOLOGY

## 2024-03-18 PROCEDURE — 99207 PR NO BILLABLE SERVICE THIS VISIT: CPT | Performed by: NURSE PRACTITIONER

## 2024-03-18 PROCEDURE — 82310 ASSAY OF CALCIUM: CPT

## 2024-03-18 PROCEDURE — 85610 PROTHROMBIN TIME: CPT | Performed by: INTERNAL MEDICINE

## 2024-03-18 PROCEDURE — 82805 BLOOD GASES W/O2 SATURATION: CPT

## 2024-03-18 PROCEDURE — 84145 PROCALCITONIN (PCT): CPT

## 2024-03-18 PROCEDURE — 99292 CRITICAL CARE ADDL 30 MIN: CPT

## 2024-03-18 PROCEDURE — 80158 DRUG ASSAY CYCLOSPORINE: CPT | Performed by: INTERNAL MEDICINE

## 2024-03-18 PROCEDURE — 94660 CPAP INITIATION&MGMT: CPT

## 2024-03-18 PROCEDURE — 80048 BASIC METABOLIC PNL TOTAL CA: CPT

## 2024-03-18 PROCEDURE — 82805 BLOOD GASES W/O2 SATURATION: CPT | Performed by: STUDENT IN AN ORGANIZED HEALTH CARE EDUCATION/TRAINING PROGRAM

## 2024-03-18 PROCEDURE — 90937 HEMODIALYSIS REPEATED EVAL: CPT

## 2024-03-18 PROCEDURE — 94640 AIRWAY INHALATION TREATMENT: CPT | Mod: 76

## 2024-03-18 PROCEDURE — 82805 BLOOD GASES W/O2 SATURATION: CPT | Performed by: NURSE PRACTITIONER

## 2024-03-18 PROCEDURE — 84100 ASSAY OF PHOSPHORUS: CPT | Performed by: STUDENT IN AN ORGANIZED HEALTH CARE EDUCATION/TRAINING PROGRAM

## 2024-03-18 PROCEDURE — 86706 HEP B SURFACE ANTIBODY: CPT | Performed by: STUDENT IN AN ORGANIZED HEALTH CARE EDUCATION/TRAINING PROGRAM

## 2024-03-18 PROCEDURE — 87633 RESP VIRUS 12-25 TARGETS: CPT

## 2024-03-18 PROCEDURE — 82550 ASSAY OF CK (CPK): CPT

## 2024-03-18 PROCEDURE — 250N000013 HC RX MED GY IP 250 OP 250 PS 637: Performed by: STUDENT IN AN ORGANIZED HEALTH CARE EDUCATION/TRAINING PROGRAM

## 2024-03-18 PROCEDURE — 250N000011 HC RX IP 250 OP 636

## 2024-03-18 PROCEDURE — 250N000012 HC RX MED GY IP 250 OP 636 PS 637: Performed by: INTERNAL MEDICINE

## 2024-03-18 PROCEDURE — 99233 SBSQ HOSP IP/OBS HIGH 50: CPT | Mod: FS | Performed by: PHYSICIAN ASSISTANT

## 2024-03-18 PROCEDURE — 250N000011 HC RX IP 250 OP 636: Performed by: STUDENT IN AN ORGANIZED HEALTH CARE EDUCATION/TRAINING PROGRAM

## 2024-03-18 PROCEDURE — 83735 ASSAY OF MAGNESIUM: CPT | Performed by: STUDENT IN AN ORGANIZED HEALTH CARE EDUCATION/TRAINING PROGRAM

## 2024-03-18 PROCEDURE — P9047 ALBUMIN (HUMAN), 25%, 50ML: HCPCS | Performed by: STUDENT IN AN ORGANIZED HEALTH CARE EDUCATION/TRAINING PROGRAM

## 2024-03-18 PROCEDURE — 71045 X-RAY EXAM CHEST 1 VIEW: CPT

## 2024-03-18 PROCEDURE — 999N000157 HC STATISTIC RCP TIME EA 10 MIN

## 2024-03-18 PROCEDURE — 36415 COLL VENOUS BLD VENIPUNCTURE: CPT

## 2024-03-18 PROCEDURE — 258N000003 HC RX IP 258 OP 636: Performed by: STUDENT IN AN ORGANIZED HEALTH CARE EDUCATION/TRAINING PROGRAM

## 2024-03-18 PROCEDURE — 999N000185 HC STATISTIC TRANSPORT TIME EA 15 MIN

## 2024-03-18 PROCEDURE — G1010 CDSM STANSON: HCPCS | Performed by: RADIOLOGY

## 2024-03-18 PROCEDURE — 85027 COMPLETE CBC AUTOMATED: CPT | Performed by: STUDENT IN AN ORGANIZED HEALTH CARE EDUCATION/TRAINING PROGRAM

## 2024-03-18 PROCEDURE — 85027 COMPLETE CBC AUTOMATED: CPT

## 2024-03-18 PROCEDURE — 87340 HEPATITIS B SURFACE AG IA: CPT | Performed by: STUDENT IN AN ORGANIZED HEALTH CARE EDUCATION/TRAINING PROGRAM

## 2024-03-18 PROCEDURE — 999N000044 HC STATISTIC CVC DRESSING CHANGE

## 2024-03-18 PROCEDURE — 250N000009 HC RX 250

## 2024-03-18 PROCEDURE — 87637 SARSCOV2&INF A&B&RSV AMP PRB: CPT

## 2024-03-18 PROCEDURE — 250N000013 HC RX MED GY IP 250 OP 250 PS 637: Performed by: INTERNAL MEDICINE

## 2024-03-18 PROCEDURE — 250N000012 HC RX MED GY IP 250 OP 636 PS 637

## 2024-03-18 PROCEDURE — 82247 BILIRUBIN TOTAL: CPT

## 2024-03-18 PROCEDURE — 87799 DETECT AGENT NOS DNA QUANT: CPT | Performed by: PHYSICIAN ASSISTANT

## 2024-03-18 PROCEDURE — 83735 ASSAY OF MAGNESIUM: CPT

## 2024-03-18 PROCEDURE — 99207 PR NO BILLABLE SERVICE THIS VISIT: CPT

## 2024-03-18 PROCEDURE — 200N000002 HC R&B ICU UMMC

## 2024-03-18 PROCEDURE — 250N000009 HC RX 250: Performed by: STUDENT IN AN ORGANIZED HEALTH CARE EDUCATION/TRAINING PROGRAM

## 2024-03-18 PROCEDURE — 84100 ASSAY OF PHOSPHORUS: CPT

## 2024-03-18 PROCEDURE — 71250 CT THORAX DX C-: CPT | Mod: MG

## 2024-03-18 PROCEDURE — 99233 SBSQ HOSP IP/OBS HIGH 50: CPT | Mod: FS | Performed by: NURSE PRACTITIONER

## 2024-03-18 RX ORDER — LORAZEPAM 2 MG/ML
0.25 INJECTION INTRAMUSCULAR
Status: DISCONTINUED | OUTPATIENT
Start: 2024-03-18 | End: 2024-03-18

## 2024-03-18 RX ORDER — CHLORHEXIDINE GLUCONATE ORAL RINSE 1.2 MG/ML
15 SOLUTION DENTAL EVERY 12 HOURS
Status: DISCONTINUED | OUTPATIENT
Start: 2024-03-18 | End: 2024-03-19

## 2024-03-18 RX ORDER — NICOTINE POLACRILEX 4 MG
15-30 LOZENGE BUCCAL
Status: DISCONTINUED | OUTPATIENT
Start: 2024-03-18 | End: 2024-03-18

## 2024-03-18 RX ORDER — DEXTROSE MONOHYDRATE 25 G/50ML
25-50 INJECTION, SOLUTION INTRAVENOUS
Status: DISCONTINUED | OUTPATIENT
Start: 2024-03-18 | End: 2024-03-18

## 2024-03-18 RX ORDER — PROCHLORPERAZINE MALEATE 5 MG
10 TABLET ORAL EVERY 6 HOURS PRN
Status: DISCONTINUED | OUTPATIENT
Start: 2024-03-18 | End: 2024-04-15 | Stop reason: HOSPADM

## 2024-03-18 RX ORDER — NICOTINE POLACRILEX 4 MG
15-30 LOZENGE BUCCAL
Status: DISCONTINUED | OUTPATIENT
Start: 2024-03-18 | End: 2024-03-24

## 2024-03-18 RX ORDER — ALBUMIN (HUMAN) 12.5 G/50ML
25 SOLUTION INTRAVENOUS
Status: COMPLETED | OUTPATIENT
Start: 2024-03-18 | End: 2024-03-18

## 2024-03-18 RX ORDER — PROCHLORPERAZINE 25 MG
25 SUPPOSITORY, RECTAL RECTAL EVERY 12 HOURS PRN
Status: DISCONTINUED | OUTPATIENT
Start: 2024-03-18 | End: 2024-04-15 | Stop reason: HOSPADM

## 2024-03-18 RX ORDER — AMOXICILLIN 250 MG
1 CAPSULE ORAL 2 TIMES DAILY PRN
Status: DISCONTINUED | OUTPATIENT
Start: 2024-03-18 | End: 2024-04-15 | Stop reason: HOSPADM

## 2024-03-18 RX ORDER — AMOXICILLIN 250 MG
2 CAPSULE ORAL 2 TIMES DAILY PRN
Status: DISCONTINUED | OUTPATIENT
Start: 2024-03-18 | End: 2024-04-15 | Stop reason: HOSPADM

## 2024-03-18 RX ORDER — LORAZEPAM 2 MG/ML
0.25 INJECTION INTRAMUSCULAR EVERY 6 HOURS PRN
Status: DISCONTINUED | OUTPATIENT
Start: 2024-03-18 | End: 2024-03-28

## 2024-03-18 RX ORDER — DEXTROSE MONOHYDRATE 25 G/50ML
25-50 INJECTION, SOLUTION INTRAVENOUS
Status: DISCONTINUED | OUTPATIENT
Start: 2024-03-18 | End: 2024-03-24

## 2024-03-18 RX ADMIN — SODIUM BICARBONATE 50 MEQ: 84 INJECTION, SOLUTION INTRAVENOUS at 13:02

## 2024-03-18 RX ADMIN — ALBUMIN HUMAN 25 G: 0.25 SOLUTION INTRAVENOUS at 14:48

## 2024-03-18 RX ADMIN — HEPARIN SODIUM 5000 UNITS: 5000 INJECTION, SOLUTION INTRAVENOUS; SUBCUTANEOUS at 06:19

## 2024-03-18 RX ADMIN — CYCLOSPORINE 200 MG: 100 SOLUTION ORAL at 18:34

## 2024-03-18 RX ADMIN — Medication 10 ML: at 13:03

## 2024-03-18 RX ADMIN — SODIUM BICARBONATE 50 MEQ: 84 INJECTION, SOLUTION INTRAVENOUS at 08:09

## 2024-03-18 RX ADMIN — PREDNISONE 5 MG: 5 TABLET ORAL at 08:15

## 2024-03-18 RX ADMIN — HEPARIN SODIUM 5000 UNITS: 5000 INJECTION, SOLUTION INTRAVENOUS; SUBCUTANEOUS at 18:34

## 2024-03-18 RX ADMIN — LEVALBUTEROL HYDROCHLORIDE 1.25 MG: 1.25 SOLUTION RESPIRATORY (INHALATION) at 22:13

## 2024-03-18 RX ADMIN — Medication: at 13:58

## 2024-03-18 RX ADMIN — LIDOCAINE: 40 CREAM TOPICAL at 13:21

## 2024-03-18 RX ADMIN — FIDAXOMICIN 200 MG: 200 TABLET, FILM COATED ORAL at 21:21

## 2024-03-18 RX ADMIN — Medication 2.5 MCG: at 21:20

## 2024-03-18 RX ADMIN — OLANZAPINE 5 MG: 5 TABLET, ORALLY DISINTEGRATING ORAL at 21:27

## 2024-03-18 RX ADMIN — Medication 40 MG: at 08:14

## 2024-03-18 RX ADMIN — LIDOCAINE 4% 1 PATCH: 40 PATCH TOPICAL at 21:40

## 2024-03-18 RX ADMIN — Medication 40 MG: at 21:20

## 2024-03-18 RX ADMIN — SULFAMETHOXAZOLE AND TRIMETHOPRIM 1 TABLET: 400; 80 TABLET ORAL at 08:16

## 2024-03-18 RX ADMIN — CYANOCOBALAMIN TAB 500 MCG 500 MCG: 500 TAB at 08:16

## 2024-03-18 RX ADMIN — LEVOTHYROXINE SODIUM 25 MCG: 0.03 TABLET ORAL at 06:19

## 2024-03-18 RX ADMIN — SODIUM CHLORIDE 300 ML: 9 INJECTION, SOLUTION INTRAVENOUS at 13:58

## 2024-03-18 RX ADMIN — CYCLOSPORINE 200 MG: 100 SOLUTION ORAL at 08:14

## 2024-03-18 RX ADMIN — CALCIUM CARBONATE 600 MG (1,500 MG)-VITAMIN D3 400 UNIT TABLET 1 TABLET: at 18:34

## 2024-03-18 RX ADMIN — SODIUM CHLORIDE, POTASSIUM CHLORIDE, SODIUM LACTATE AND CALCIUM CHLORIDE 250 ML: 600; 310; 30; 20 INJECTION, SOLUTION INTRAVENOUS at 08:21

## 2024-03-18 RX ADMIN — CALCIUM CARBONATE 600 MG (1,500 MG)-VITAMIN D3 400 UNIT TABLET 1 TABLET: at 08:15

## 2024-03-18 RX ADMIN — SODIUM CHLORIDE 250 ML: 9 INJECTION, SOLUTION INTRAVENOUS at 13:57

## 2024-03-18 RX ADMIN — FIDAXOMICIN 200 MG: 200 TABLET, FILM COATED ORAL at 08:15

## 2024-03-18 RX ADMIN — Medication 5 MG: at 10:39

## 2024-03-18 RX ADMIN — Medication 2.5 MCG: at 08:15

## 2024-03-18 RX ADMIN — LEVALBUTEROL HYDROCHLORIDE 1.25 MG: 1.25 SOLUTION RESPIRATORY (INHALATION) at 08:25

## 2024-03-18 RX ADMIN — CHLORHEXIDINE GLUCONATE 15 ML: 1.2 RINSE ORAL at 21:27

## 2024-03-18 RX ADMIN — PREDNISONE 2.5 MG: 2.5 TABLET ORAL at 21:20

## 2024-03-18 RX ADMIN — CALCIUM CARBONATE 600 MG (1,500 MG)-VITAMIN D3 400 UNIT TABLET 1 TABLET: at 12:27

## 2024-03-18 ASSESSMENT — ACTIVITIES OF DAILY LIVING (ADL)
ADLS_ACUITY_SCORE: 29

## 2024-03-18 NOTE — H&P
MEDICAL ICU H&P  03/18/2024    Date of Hospital Admission: 2/10/2024  Date of ICU Admission: 3/18/2024  Reason for Critical Care Admission: Acute on chronic hypercapnic respiratory failure  Date of Service (when I saw the patient): 03/18/2024    ASSESSMENT: Sofie Rodriguez is a 63 year old female with PMH COPD s/p bilateral lung transplant 6/28/22 c/b hemidiaphragm palsy and recurrent pneumonias, gastroparesis and small bowel dysmotility complicated by severe malnutrition now s/p PEG/J, ESRD on T/Th/Sat HD, recent R femoral fx s/p ORIF, chronic diarrhea, recurrent c-diff, failure to thrive with inability to tolerate any tube feeds, who was admitted to [facility] on 2/10/24 for concerns over malnutrition and TPN initiation via portacath. On 2/17/24 she was transferred to ICU for worsening mental status and acute hypoxic and hypercarbic respiratory failure inspite of BiPAP requiring intubation. She was suspected to have PNA and started on antibiotics. Extubated on 2/19 without complications and tolerated iHD on 2/20, and tolerated PO regular diet in addition to TPN. Developed progressively worsening respiratory acidosis and hypercarbia, and was re-admitted to ICU on 2/28/24 for close monitoring of intermittent BiPAP and possible intubation, however she improved on AVAPS setting and well enough to transfer back to medicine floor on 2/29/24. Again with worsening respiratory acidosis and hypercarbia requiring transfer back to ICU 03/18/2024.       CHANGES and MAJOR THINGS TODAY:   - Admit to ICU due to worsening respiratory acidosis  - Continue BiPAP 14/5  - STAT VBG upon arrival to ICU  - Obtain CT chest w/o contrast   - Repeat COVID/Flu/Respiratory virus panel  - Check CK         PLAN:    Neuro:  # Pain and sedation  Reports soreness in her neck from lying in bed. No soreness in the buttocks/ back. Patient has multiple bruises over her arms and face which is attributed to previous falls. Using heating pads and  lidocaine patch for body pains.   - Tylenol Q4  - Lidocaine patch prn  - Heating pads prn    # Claustrophobia  Reports claustrophobia contributing to limited compliance with BiPAP  - Ativan 0.25 mg PRN for anxiety  - Consider Health Psych consult    # B/L Neuropathic Pain   New pain b/l over the past few days. Last A1c <4.2 (7/11/23). Consider possibly 2/2 ESRD. TSH wnl. B12 elevated. B6, copper, B1, B3 pending, zinc 48.3 (mildly low)   -Consider gabapentin in the coming days   -Neuro Recs:  - No obvious clinical history or exam findings to suggest neurologic/neuromuscular causes of respiratory weakness  - Neuropathy labs currently pending       Pulmonary:  # Acute on chronic hypoxic and hypercarbic respiratory failure  # S/p BSLT 6/8/22 for COPD  # R hemidiaphragm palsy  # Recurrent PNAs   # Positive DSA   # Suspected CARLEE  # PTA nocturnal O2, 2L   S/p bilateral lung transplant 6/28/22 for COPD. Was admitted 2/10 to address malnutrition and admitted to ICU on 2/17 with hypoxic hypercarbic respiratory failure. Intubated on 2/18 AM  due to worsening oxygen requirements, likely due to pulmonary edema given hx of ESRD requiring HD vs infection given hx of lung transplant, immunosuppressed status, and recurrent pneumonias. Required intubation 2/17 - 2/19. Ongoing respiratory acidosis despite BiPAP/AVAPS, claustrophobic while using to intermittent compliance.  Neurology consulted and MRI with no acute intracranial pathology identified, exam reassuring against neuromuscular etiology of respiratory failure. 3/08 SNIFF with no definite paradoxical movement of bilateral hemidiaphragm. NIF noted to be downtrending 3/5 -40 --> 3/15 -33, FVC 3/5 830 --> 3/15 400. Transferred back to ICU 3/18 d/t pH 7.05 pCO2 102.   - BiPAP 14/6, check VBG upon arrival to ICU  - CXR on 3/18: Increased diffuse interstitial and basilar predominant airspace opacities, likely edema/atelectasis versus possible infection.  - Obtain Chest CT w/o  contrast  - Check CK  - Airway clearance/nebs:  - Xopenex neb BID  - Hypertonic saline nebs q 3 hours PRN   - Volume removal with iHD  - Sleep clinic eval at discharge for suspected CARLEE  - Limit medications that would depress respiration  - Transplant pulm following, appreciate recs   - 3/18 prospera in process  Immunosuppression/ppx:  Prednisone BID   Cyclosporin 200 mg BID (started 3/11, tacro stopped)  Bactrim (started 3/11, dapsone stopped)       Cardiovascular:  # A-flutter (recurrent on 3/17)  # A-fib, intermittent  # HTN  # HFpEF  Noted atrial fibrillation on arrival with HR elevated at 150, not sustained for > 10 minutes and otherwise hemodynamically stable. RVR resolved w/o medications.  PTA metoprolol (25mg BID) has been held through much of admission. On 3/17 new Aflutter developed overnight. HR 150s with flutter waves on EKG. Metoprolol administered along with Mg infusion with minimal response. Amio bolus / gtt started with HR decrease to 120-130s. Amio gtt discontinued 3/18, returned to NSR overnight 3/17.    - 2/17 TTE: LVEF 55-60%, global RV function normal, no significant valvular abnormalities  - MAP goal > 65 mmHg  - HOLD PTA Metoprolol 25 mg BID   - Continuous telemetry        GI/Nutrition:  # Severe malnutrition   # Failure to thrive  # Hypoalbuminemia  # Gastroparesis, small bowel dysmotility  # S/P PEG/J with intolerance of enteral nutrition  #  Chronic osmotic diarrhea/SIBO s/p Rifaximin  # Recurrent C.Diff (3rd ep)    # GERD  Patient with gastroparesis (presumed due to vagal injury) and small bowel dysmotility complicated by unintentional 40lb weight loss over the past year and now severe malnutrition. Previously intolerant to oral food intake due to nausea. Was initially admitted for portacath and TPN initiation since 2/13. After extubation has been able to tolerate feeds without n/v from 2/20. Per GI, next steps for workup for malnutrition would include CT enterography to evaluate for  anatomic abnormalities contributing to malnutrition vs possible treatment for SIBO (though patient has had multiple courses of treatment in the past with no long term improvement). Patient couldn't tolerate the oral load for CT enterography on 2/21, so will defer this until she is able to tolerate greater PO load. On 2/23 , again discussed with patient the need of small bowel motility study if not improving outpatient through [facility]. No ongoing concerns for SIBO on 2/23 as patient is passing multiple episodes of stools and gas with no abdominal pain or distention. C-diff test negative on 2/21. Per RD, patient tolerating >300 kcal of intake PO currently, so stopped trickle Tube feeds and continuing with PO and TPN for nutrition (sufficient for preventing gut atrophy).  Tunneled CVC removed on 3/7 without complications, after PICC placement. As of 3/11 transplant pulmonology is worried that TPN is not a realistic plan to continue at home and would like to transition back to TF (RD concerned pt is not absorbing any oral intake). Transplant pulm aslo worried that her tacro and dapsone could be contributing to diarrhea (mucosal toxicity) [meds discontinued 3/11]. Repeat enteric studies showed recurrent C.diff;  Fidaxomicin x 10 days started (GI approved). Transplant pulm requesting repeat colonoscopy w/ Bx after completion of c.diff treatment, to r/o toxicity or other reason that diarrhea is present.  -Nutrition consulted, appreciate assistance  - TPN discontinued 3/16  - NPO (previously on regular diet) in setting of worsening respiratory status  - TF at goal rate 35 ml/hr           - GI consulted/signed off; appreciate recs              -PO Fidaxomicin 200 mg BID for 10 days- diarrhea has improved since start of tx               -May reconsult GI after tx of C.diff for future colonoscopy +/- biopsy    - May benefit from small bowel motility study if not improving outpatient through [facility].   - PPI BID  - Bowel regimen PRN        Renal/Fluids/Electrolytes:  # ESRD on HD  # Hypervolemic hyponatremia, resolved  # Mild hyperphosphatemia  # Anion gap metabolic acidosis - resolved  Patient is ESRD on T//Sat HD as outpt.  - HD run today 3/18 due to worsening pulmonary edema  - CMP, Mg, Phos daily  - Strict I/Os  - Daily weight  - Renal adjust medications       Endocrine:  # Hyperglycemia, stress induced  - Start low dose sliding scale insulin  - Hypoglycemia protocol    # Hypothyroidism  Patient with new low T4 at 0.64 and TSH at 6.5, concerning for new hypothyroidism vs sick thyroid syndrome. TSH with appropriate response, more consistent with elevation in the setting of acute illness. ICU team on  recommended initiation of treatment for possible hypothyroid as this can improve respiratory muscle function in the setting of weakness and malnutrition. 3/7 repeat thyroid function WNL.  - Continue liothyronine and levothyroxine per ICU team recommendations       ID:  # Recurrent C.Diff colitis  # Recurrent pneumonia  # Hx EBV viremia   # Hypogammaglobulinemia  # Chronic immunosuppression  lung transplant  Empirically treated for pneumonia  - , RVP and cultures no growth to date. Recurrent C.Diff Positive 3/12.   - Afebrile, WBC slight increase, follow curve  -  Ig  -  EBV 27K (decreased compared to prior)    - In setting of recurrent hypercapnic respiratory failure, consider panculture, bronch if requires intubation    Antibiotics:  Fidaxomicin (3/13-*3/22)  Micafungin (- )  Zosyn ( - ) for HAP  Vancomycin ( - )  Bactrim MWF, PJP ppx (previously on Dapsone)      Hematology:    # #Acute on chronic anemia 2/2 ESRD  Hgb stable, with no acute signs of bleeding. Aute drop  from 8.2 > 6.6 after two rechecks, no overt signs of bleeding; required 1U pRBC transfusion.    - Daily CBC  - Transfuse for Hgb < 7  - Continue epogen per nephrology with dialysis  - Venofer 50 mcg qweek with  dialysis    #Steal physiology of LUE dialysis access fistula  LUE arterial US obtained 2/21 with concern for LUE edema. US of fistula demonstrated steal physiology. Discussed with nephrology, and vascular surgery consulted on 2/22. Vascular surgery has only minor concerns for steal symptoms and given that the AVF is working well, they are okay with outpatient fistulograms/ venoplasty with wrist brachial index and PPG's, unless new concerns arise.  - plan for outpatient workup as above; nephrology in agreement with outpatient workup.    Musculoskeletal:  # Right hip fracture s/p ORIF (December 2023)   - PT/OT       Skin:  # Left upper extremity unilateral edema, improving   # Bilateral pedal and ankle edema, improving  Edema due to third spacing due to hypoalbuminemia vs HF. BNP >89141 at admission, Echo on 2/18 shows EF of 55-60% with collapsible IVC. Extremity edema 2/2 to hypoalbuminemia. Upper limb duplex USG on 2/18 negative for DVT.  USG left arm on 2/21 shows steel physiology and Vascular Surgery consulted (see below). Overall edema in extremities have improved significantly since initiating 4x/wk dialysis.    - Elevation and wrapping of only lower legs as needed and increased UF per nephrology for volume management; no wrapping of left upper extremity with dialysis fistula  - Lymphedema consulted for BLE edema        General Cares/Prophylaxis:    DVT Prophylaxis: Heparin SQ  GI Prophylaxis: PPI  Restraints: N/A  Family Communication: Family updated  Code Status: FULL    Per Medicine team, discussion with daughter and patient regarding worsening respiratory status requiring transfer to ICU - agreeable to ICU admission and intubation if necessary. Upon arrival to ICU, patient confirmed full code status and intubation if necessary.     Lines/tubes/drains:  - PIV  - PEG/J  - Left arm AV fistula  - PICC line    Disposition:  - Medical ICU     Patient seen and findings/plan discussed with medical ICU staff,   Yanni.    RUFUS Giraldo CNP    50 minutes of critical care time    Attending note:  Patient seen, examined and discussed with the EMILY. All data reviewed including vital signs, medications, laboratory studies, and imaging.  Agree with the assessment and plan as outlined in the above note.  The patient remains critically ill with acute on chronic respiratory failure, s/p lung transplant, COPD, ESRD, chronic CO retention.  I personally adjusted the BiPAP to treat the acute on chronic respiratory failure, followed hemodynamics in the setting of ESRD.  Transferred back to MICU secondary to increasing PCO2 and decreased pH, has been chronic issus without clear etiology; appears baseline PCO2 is 70-80.  Has required short-term intubation recently for similar problem.  Lack of bicarbonate generation by kidney is adding to chronic problem.  Has been assessed for neurologic disease in the past without conclusive findings.   Will continue BiPAP and discuss with renal regarding increasing HCO3 support.     Total Critical care  time excluding time for teaching and procedures was 70 minutes    Ana Liao MD  [phone]    Clinically Significant Risk Factors        # Hyperkalemia: Highest K = 5.4 mmol/L in last 2 days, will monitor as appropriate     # Hypomagnesemia: Lowest Mg = 1.6 mg/dL in last 2 days, will replace as needed   # Hypoalbuminemia: Lowest albumin = 2.6 g/dL at 2/18/2024  5:13 AM, will monitor as appropriate             # Severe Malnutrition: based on nutrition assessment    # Financial/Environmental Concerns: none                  -----------------------------------------------------------------------    HISTORY PRESENTING ILLNESS: Sofie Rodriguez is a 63 year old female with PMH COPD s/p bilateral lung transplant 6/28/22 c/b hemidiaphragm palsy and recurrent pneumonias, gastroparesis and small bowel dysmotility complicated by severe malnutrition now s/p PEG/J, ESRD on T/Th/Sat HD, recent R femoral fx s/p ORIF,  chronic diarrhea, recurrent c-diff, FTT with inability to tolerate any tube feeds, who was admitted to [facility] on 2/10/24 for concerns over malnutrition and failure to thrive requiring TPN initiation via portacath. On 2/17/24 she was transferred to ICU for worsening mental status and acute hypoxic and hypercarbic respiratory failure inspite of BiPAP requiring intubation. She was suspected to have PNA and started on antibiotics. Extubated on 2/19. Re-admitted ICU 2/28/24 for worsening respiratory acidosis and acute on chronic hypercarbia with encephalopathy despite BiPAP, able to transfer out of ICU without needing re-intubation. Ongoing respiratory acidosis with limited BiPAP compliance 2/2 claustrophobia.     Re-admitted to ICU 3/18/2024 for worsening respiratory acidosis with pH 7.05, pCO2 102 on BiPAP 10/5. Patient arrived to ICU following dialysis, 600 mL volume removed. Patient alert, oriented x3. Denies chest pain or shortness of breath, no abdominal pain. Denies any concerns regarding her breathing, has not been coughing, no sputum production. Reports claustrophobia and reluctance to wear BiPAP mask.          REVIEW OF SYSTEMS: Negative except for above.       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    Hepatitis 2017    Hep C, [facility]    History of blood transfusion     HTN (hypertension)     Infectious mononucleosis     Lung infection 11/30/2022    Osteopenia      SURGICAL HISTORY:  Past Surgical History:   Procedure Laterality Date    BRONCHOSCOPY (RIGID OR FLEXIBLE), DIAGNOSTIC N/A 08/02/2022    Procedure: BRONCHOSCOPY, DIAGNOSTIC- inspection Bronch;  Surgeon: Kamala Lovell MD;  Location: [facility]    BRONCHOSCOPY (RIGID OR FLEXIBLE), DIAGNOSTIC N/A  09/13/2022    Procedure: INSPECTION BRONCHOSCOPY, WITH BRONCHOALVEOLAR LAVAGE;  Surgeon: Jose R Mccullough MD;  [facility]: [facility]    BRONCHOSCOPY (RIGID OR FLEXIBLE), DIAGNOSTIC N/A 11/09/2022    Procedure: BRONCHOSCOPY, WITH BRONCHOALVEOLAR LAVAGE AND BIOPSY;  Surgeon: Cesar Lima MD;  [facility]    BRONCHOSCOPY (RIGID OR FLEXIBLE), DIAGNOSTIC N/A 01/25/2023    Procedure: BRONCHOSCOPY, WITH BRONCHOALVEOLAR LAVAGE AND BIOPSY;  Surgeon: Mason Reddy MD;  [facility]    BRONCHOSCOPY (RIGID OR FLEXIBLE), DIAGNOSTIC N/A 04/19/2023    Procedure: BRONCHOSCOPY, WITH BRONCHOALVEOLAR LAVAGE AND BIOPSY;  Surgeon: Kamala Lovell MD;  [facility]    BRONCHOSCOPY (RIGID OR FLEXIBLE), DIAGNOSTIC N/A 07/12/2023    Procedure: BRONCHOSCOPY, WITH BRONCHOALVEOLAR LAVAGE AND BIOPSY;  Surgeon: Cesar Lima MD;  [facility]    BRONCHOSCOPY FLEXIBLE AND RIGID N/A 07/19/2022    Procedure: BRONCHOSCOPY inspection only;  Surgeon: Bob Liao MD;  [facility]    COLONOSCOPY  2015    CORONARY ANGIOGRAPHY ADULT ORDER      CV CORONARY ANGIOGRAM N/A 06/30/2021    Procedure: CV CORONARY ANGIOGRAM;  Surgeon: Alexander Cuellar MD;  Location:  HEART CARDIAC CATH LAB    CV RIGHT HEART CATH MEASUREMENTS RECORDED N/A 06/30/2021    Procedure: CV RIGHT HEART CATH;  Surgeon: Alexander Cuellar MD;  Location:  HEART CARD[facility]AC CATH LAB    ENDOSCOPIC PERORAL MYOTOMY N/A 10/09/2023    Procedure: MYOTOMY, ESOPHAGUS, ENDOSCOPIC, ORAL APPROACH;  Surgeon: Gonzalez Navarro MD;  [facility]    ENDOSCOPIC RETROGRADE CHOLANGIOPANCREATOGRAM N/A 08/11/2022    Procedure: ENDOSCOPIC RETROGRADE CHOLANGIOPANCREATOGRAPHY WITH PANCREATIC DUCT NEEDLE KNIFE AND STENT PLACEMENT, BILE DUCT SPHINCTEROTOMY, BLOOD/DEBRIS REMOVAL AND STENT PLACEMENT;  Surgeon: Cosmo Arroyo MD;  [facility]    ENDOSCOPIC RETROGRADE CHOLANGIOPANCREATOGRAM N/A 10/07/2022    Procedure: ENDOSCOPIC RETROGRADE CHOLANGIOPANCREATOGRAPHY with  biliary and pancreatic stent removal, debris removal;  Surgeon: Cosmo Arroyo MD;  [facility]    ENT SURGERY  1974    tonsillectomy    ENTEROSCOPY SMALL BOWEL N/A 08/11/2022    Procedure: SMALL BOWEL ENTEROSCOPY;  Surgeon: Cosmo Arroyo MD;  [facility]    ESOPHAGOGASTRODUODENOSCOPY, WITH NASOGASTRIC TUBE INSERTION N/A 07/01/2022    Procedure: ESOPHAGOGASTRODUODENOSCOPY, WITH NASOJEJUNAL TUBE INSERTION;  Surgeon: Ozzy Nickerson MD;  Location:  GI    ESOPHAGOSCOPY, GASTROSCOPY, DUODENOSCOPY (EGD), COMBINED N/A 08/03/2022    Procedure: ESOPHAGOGASTRODUODENOSCOPY (EGD);  Surgeon: Ira Andres MD;  Location:  GI    ESOPHAGOSCOPY, GASTROSCOPY, DUODENOSCOPY (EGD), COMBINED N/A 01/25/2023    Procedure: ESOPHAGOGASTRODUODENOSCOPY (EGD) with botox injection;  Surgeon: Gonzalez Navarro MD;  Location:  GI    ESOPHAGOSCOPY, GASTROSCOPY, DUODENOSCOPY (EGD), COMBINED N/A 10/09/2023    Procedure: ESOPHAGOGASTRODUODENOSCOPY;  Surgeon: Gonzalez Navarro MD;  [facility]    HAND SURGERY      INSERT CHEST TUBE Right 09/13/2022    Procedure: Insert chest tube;  Surgeon: Jose R Mccullough MD;  [facility]    IR CVC TUNNEL PLACEMENT > 5 YRS OF AGE  09/26/2022    IR CVC TUNNEL PLACEMENT > 5 YRS OF AGE  02/14/2024    IR CVC TUNNEL REMOVAL RIGHT  3/6/2024    IR GASTRO JEJUNOSTOMY TUBE CHANGE  08/31/2022    IR GASTRO JEJUNOSTOMY TUBE CHANGE  12/21/2022    IR GASTRO JEJUNOSTOMY TUBE CHANGE  07/12/2023    IR GASTRO JEJUNOSTOMY TUBE CHANGE  08/18/2023    IR GASTRO JEJUNOSTOMY TUBE CHANGE  11/14/2023    IR GASTRO JEJUNOSTOMY TUBE PLACEMENT  07/27/2022    IR THORACENTESIS  08/29/2022    LEEP TX, CERVICAL  04/07/2017    HECTOR III    LYMPH NODE BIOPSY Left 2005    Left axilla, benign- [place]    MIDLINE INSERTION - DOUBLE LUMEN Left 07/28/2022    20cm, Basilic vein    PICC DOUBLE LUMEN PLACEMENT Right 03/04/2024    5FR DL PICc, basiliv vein- L-36cm, 1cm out.    REPLACE GASTROJEJUNOSTOMY  TUBE, PERCUTANEOUS  10/07/2022    Procedure: Replace Gastrojejunostomy Tube;  Surgeon: Cosmo Arroyo MD;  [facility]    THORACENTESIS Left 08/29/2022    Procedure: THORACENTESIS;  Surgeon: Bo Capone PA-C;  Location: [facility] OR    THORACENTESIS Left 09/13/2022    Procedure: Thoracentesis;  Surgeon: Jose R Mccullough MD;  [facility]    THROMBECTOMY UPPER EXTREMITY Left 07/02/2022    Procedure: LEFT RADIAL ARM THROMBECTOMY;  Surgeon: Christie Graham MD;  [facility]    TRANSPLANT LUNG RECIPIENT SINGLE X2 Bilateral 06/28/2022    Procedure: Clamshell Incision, Bilateral Sequential Lung Transplant, On Cardiopulmonary Bypass, Flexible Bronchoscopy;  Surgeon: Sue Sunshine MD;  [facility]     SOCIAL HISTORY:  Social History     Socioeconomic History    Marital status:    Tobacco Use    Smoking status: Former     Packs/day: 0.50     Years: 30.00     Additional pack years: 0.00     Total pack years: 15.00     Types: Cigarettes     Quit date: 11/11/2020     Years since quitting: 3.3    Smokeless tobacco: Never   Substance and Sexual Activity    Alcohol use: Not Currently     Comment: Stopped drinking in 2020    Drug use: Not Currently     Types: Marijuana, Methamphetamines     Comment: hx:marijuana and methamphetamine-quit both unsure ?  2-3 yrs ago     Social Determinants of Health     Interpersonal Safety: Not At Risk (9/1/2023)    Humiliation, Afraid, Rape, and Kick questionnaire     Fear of Current or Ex-Partner: No     Emotionally Abused: No     Physically Abused: No     Sexually Abused: No     FAMILY HISTORY:   No family history on file.  ALLERGIES:   Allergies   Allergen Reactions    Blood Transfusion Related (Informational Only) Other (See Comments)     Patient has a history of a clinically significant antibody against RBC antigens.  A delay in compatible RBCs may occur.     No Clinical Screening - See Comments Other (See Comments)     Patient has a history of a  clinically significant antibody against RBC antigens.  A delay in compatible RBCs may occur.    Azithromycin Rash     12/1/22: Developed a rash that is not raised and looks diffuse in nature. It started in the groin and up the back and has now worked its way up her chest into her face. Pt states that it has now started to itch. She is breathing and talking normally and denies any airway changes. Unclear what started rash. Pt noted feeling somewhat itchy yesterday.    Ganciclovir Rash     12/1/22: Developed a rash that is not raised and looks diffuse in nature. It started in the groin and up the back and has now worked its way up her chest into her face. Pt states that it has now started to itch. She is breathing and talking normally and denies any airway changes. Unclear what started rash. Pt noted feeling somewhat itchy yesterday.     MEDICATIONS:  No current facility-administered medications on file prior to encounter.  acetaminophen (TYLENOL) 500 MG tablet, 500-1,000 mg by Per J Tube route 3 times daily as needed for mild pain  B Complex-C-Folic Acid (DIALYVITE) TABS, 1 tablet by Per J Tube route every morning  Calcium Carbonate-Vitamin D 600-10 MG-MCG TABS, 1 tablet by Per J Tube route 3 times daily  dapsone 2 mg/mL SUSP, 25 mLs (50 mg) by Per J Tube route Every Mon, Wed, Fri Morning  furosemide (LASIX) 40 MG tablet, 40 mg by Per J Tube route 2 times daily  loperamide (IMODIUM A-D) 2 MG tablet, 1 tablet (2 mg) by Per J Tube route 4 times daily as needed for diarrhea  metoprolol tartrate (LOPRESSOR) 50 MG tablet, 0.5 tablets (25 mg) by Per Feeding Tube route 2 times daily  multivitamin (CENTRUM SILVER) tablet, Take 1 tablet by mouth daily  pantoprazole (PROTONIX) 2 mg/mL SUSP suspension, 20 mLs (40 mg) by Per J Tube route 2 times daily  predniSONE (DELTASONE) 5 MG tablet, Take 1 tablet (5 mg) by mouth every morning AND 0.5 tablets (2.5 mg) every evening. Take 1 tab (5mg) at AM and 1/2 tab (2.5) in pm (Patient  taking differently: Take 1 tablet (5 mg) by J-tube every morning AND 0.5 tablets (2.5 mg) every evening. Take 1 tab (5mg) at AM and 1/2 tab (2.5) in pm)  protein modular (PROSOURCE TF) LIQD, 1 packet by Per Feeding Tube route daily (Patient taking differently: 1 packet by Per Feeding Tube route daily at 2 pm)  sevelamer carbonate, RENVELA, 0.8 GM PACK Packet, Take 1 packet (0.8 g) by mouth 3 times daily (with meals) (Patient taking differently: 0.8 g by Per J Tube route 3 times daily (with meals))  tacrolimus (GENERIC) 1 mg/mL suspension, Take 4 mLs (4 mg) by mouth every morning AND 4 mLs (4 mg) every evening. (Patient taking differently: Take 4 mLs (4 mg) by j-tube every morning AND 4 mLs (4 mg) every evening.)  vitamin B-12 (CYANOCOBALAMIN) 500 MCG tablet, 1 tablet (500 mcg) by Per Feeding Tube route daily  [DISCONTINUED] albuterol (PROAIR HFA/PROVENTIL HFA/VENTOLIN HFA) 108 (90 BASE) MCG/ACT Inhaler, Inhale 2 puffs into the lungs every 6 hours as needed for shortness of breath / dyspnea or wheezing  [DISCONTINUED] insulin glargine (LANTUS PEN) 100 UNIT/ML pen, Inject 7 Units Subcutaneous 2 times daily  [DISCONTINUED] ipratropium - albuterol 0.5 mg/2.5 mg/3 mL (DUONEB) 0.5-2.5 (3) MG/3ML neb solution, Inhale 1 vial into the lungs every 4 hours as needed for shortness of breath / dyspnea  [DISCONTINUED] mycophenolate (GENERIC EQUIVALENT) 200 MG/ML suspension, 3.75 mLs (750 mg) by Per J Tube route 2 times daily  [DISCONTINUED] valGANciclovir (VALCYTE) 50 MG/ML solution, 9 mLs (450 mg) by Oral or Feeding Tube route three times a week (Patient taking differently: 450 mg by Oral or Feeding Tube route three times a week Take on Mon/Wed/Fri)        PHYSICAL EXAMINATION:  Temp:  [98  F (36.7  C)-98.7  F (37.1  C)] 98  F (36.7  C)  Pulse:  [] 86  Resp:  [18-30] 23  BP: ()/() 97/51  Cuff Mean (mmHg):  [] 113  FiO2 (%):  [30 %-40 %] 30 %  SpO2:  [92 %-100 %] 100 %    General: Resting in bed, NAD on  BiPAP  HEENT: Mucous membranes dry  Neuro: A&Ox3, moving all extremities, equal strength  Pulm/Resp: Diminished breath sounds bilaterally without rhonchi, crackles or wheeze, breathing non-labored on BiPAP   CV: RRR, S1S2, no murmur, rub, or gallop  Abdomen: Soft, non-distended, non-tender, hypoactive  Ext: 1+ pedal edema, pulses 2+ radial, pedal  Incisions/Skin: Warm, dry. No rashes or lesions.     LABS: Reviewed.   Arterial Blood Gases   No lab results found in last 7 days.  Complete Blood Count   Recent Labs   Lab 03/18/24  0651 03/16/24  0651 03/14/24  0553 03/12/24  0613   WBC 10.1 7.7 6.2 6.3   HGB 10.7* 10.0* 9.6* 9.7*    203 212 227     Basic Metabolic Panel  Recent Labs   Lab 03/18/24  0651 03/17/24  1835 03/17/24  0328 03/16/24  2045 03/16/24  0800 03/16/24  0651     --  141 137  --  137   POTASSIUM 5.4*  --  3.9 4.2  --  4.5   CHLORIDE 101  --  103 100  --  101   CO2 21*  --  27 26  --  24   BUN 88.2*  --  53.4* 38.9*  --  79.4*   CR 4.26*  --  2.96* 2.52*  --  3.90*   * 80 74 114*   < > 104*    < > = values in this interval not displayed.     Liver Function Tests  Recent Labs   Lab 03/18/24  0651 03/17/24  0328 03/16/24  0651 03/15/24  0554   AST 19 16 17 17   ALT <5 12 <5 8   ALKPHOS 158* 147 121 117   BILITOTAL 0.3 0.2 0.3 0.3   ALBUMIN 3.6 3.5 3.4* 3.6   INR 1.10  --   --   --      Coagulation Profile  Recent Labs   Lab 03/18/24  0651   INR 1.10       IMAGING:  Recent Results (from the past 24 hour(s))   XR Chest Port 1 View    Narrative    Exam: XR CHEST PORT 1 VIEW  3/18/2024 10:07 AM     History:  s/p lung transplant, interval monitoring, hypercapnia       Comparison:  3/5/2024    Findings: Single view of the chest. Postsurgical changes of the chest  with intact clam shell sternotomy wires. Right upper extremity PICC  tip projects over the low SVC. Stable cardiomediastinal silhouette.  Similar small pleural effusions, left greater than right. Pulmonary  artery convexity is  somewhat enlarged as is left atrial appendage  convexity of the left mediastinal border. No pneumothorax. Diffuse  interstitial prominence with basilar predominant air space opacities,  increased.      Impression    Impression:   1. Increased diffuse interstitial and basilar predominant airspace  opacities, likely edema/atelectasis versus possible infection.  2. Stable small pleural effusions.  3. Mild pulmonary enlargement as present on recent chest CT. Also,  left atrial appendage enlargement.    I have personally reviewed the examination and initial interpretation  and I agree with the findings.    ALVINA HARRY MD         SYSTEM ID:  [id number]

## 2024-03-18 NOTE — PROGRESS NOTES
CLINICAL NUTRITION SERVICES - BRIEF NOTE     Nutrition Prescription     Recommendations already ordered by Registered Dietitian (RD):  [facility] cts 318-3/24 to gauge PO intake trends/adequacy    Modified TF order to reflect plan for holding TFs around Synthroid administration   EN via J-port: Eneida Stoll Renal Support at rate of 35 mL/hr x 22 hours/day (held for 1 hour before/after Synthroid) to provide: 770 mL formula, 1386 Kcals, 62 gm pro, 131 gm CHO, 15 gm fiber, 516 mL free water daily   --> Meets 100% low end Kcal and protein goals + takes PO    Duplicate free water flush orders in place; discontinued duplicate order but continue 30 mL Q4hrs for FT patency     Future/Additional Recommendations:  Continue to monitor nutrition related findings per protocol    For last full RD assessment, see note dated 3/14/24    NEW FINDINGS   - TPN discontinued, TFs at current goal rate of 35 mL/hr. Per PharmD, need to hold TFs for 1 hour before/after Synthroid. RD was considering increasing pt's TF rate to accomodate for holding of TFs, however per chart review pt currently on [facility] cts, was able to eat >1200 Kcals yesterday with 100% intake of 3 meals documented. Additionally, pt very sensitive to TF rate adjustments, per chart review of TF hx PTA. Will extend [facility] cts, monitor wt trends, and defer TF adjustments until better able to gauge pt intake trends/adequacy.    Dosing Weight: 46 kg (EDW)   ASSESSED NUTRITION NEEDS [Updated with pt transitioning back to enteral feeds]  Estimated Energy Needs: 0614-2766+ kcals/day (30 - 35+ kcals/kg )  Justification: Repletion/HD  Estimated Protein Needs: 55-70 grams protein/day (1.2 - 1.5 grams of pro/kg)  Justification: Dialysis/repletion  Estimated Fluid Needs: 500-1000 mL/day + UOP   Justification: Hemodialysis, or fluids per provider pending fluid status    - Labs/meds reviewed    INTERVENTIONS  Implementation  Collaboration with other providers - PharmD, bedside RN   Enteral Nutrition -  Modify order to reflect TFs held 1-hour before/after Synthroid   Extend sudha cts     Monitoring/Evaluation  Will continue to monitor and evaluate per protocol.    Cortez Maxwell, MS, RDN, LD, CNSC  Available by babbel or phone   Vocera: M-F (7:00-3:30)  6B Clinical Dietitian    Weekend/Holiday (7:00-3:30) - Weekend Clinical Dietitian   6B RD desk: [phone]       **Clinical Dietitians will no longer be responding to pages. Vocera only please.

## 2024-03-18 NOTE — PROGRESS NOTES
Nephrology Progress Note  03/18/2024         Assessment & Recommendations:   Sofie Rodriguez is a 63 year old year old female with ESKD, COPD s/p b/l lung transplant in 6/2022 with multiple complications, gastroparesis s/p GJ tube, GIB, chronic diarrhea, recurrent c-diff, FTT with inability to tolerate any tube feeds, R hip fx s/p ORIF 12/2023, admitted on 2/10 for initiation of TPN/lipids, transferred to ICU on 2/17 with worsening mental status and respiratory acidosis in spite of bipap, ultimately intubated on 2/18, being treated for PNA, also with volume overload in setting of high volume TPN. Persistent respiratory acidosis in spite of volume off, transferred to ICU for hypotension and respiratory failure, improved on bipap, now floor status again 3/1    # ESKD - TTS, LUE AVG, 3hr, 45.5kg (will be lowered), [facility], Dr. Andres Fairbanks. She has been losing weight and now even below her recent set EDW.  - With TPN condensed to 640 ml max per day, we can manage volume with HD 3x/week  - Continue HD on TTS schedule, extra run today for worsening pulm edema   - Requires EMLA cream an hour before HD  - please avoid PICC which will compromise future dialysis accesses; a midline is much preferred for this patient    # Mild hyperkalemia: in setting of worsening respiratory acidosis  - short HD ray today      # Dialysis access: LUE AVG placed 3-4 months ago, per pt. She had arm swelling and a fistulogram a couple months ago and swelling improved but now has redeveloped.  - US with c/f possible steal syndrome  - per vascular surgery, no concern for steal syndrome, ok to go ahead with fistulogram  - given that AVF is working well on dialysis (450 BFR) and at this time IR is only able to perform fistulograms when AVF isn't working well, will defer to OP for management.     # Persistent respiratory acidosis:    - pt doesn't tolerated bipap and often refuses to wear  - this morning when we rounded, bipap was very  "loose and leaking on all sides making it essentially useless for management of hypercapnia; RT is reportedly getting a better fitting mask  - giving bicarb only worsens the acidosis since this is a respiratory acidosis and extra bicarb is simply converted to more CO2  - transplant pulm following    # Volume/BP: Anuric; on metoprolol soln 25 mg bid (held)  - gently challenging EDW as able, targeting 42 to 43 kg  - CXR 3/18 with worsening pulm edema  - please record TPN in I/O's   - appreciate condensing TPN (currently 400-600 ml per day)     # Nutrition: on TPN and regular diet  - per team, concern is that pt isn't absorbing much PO or tube feeds with diarrhea increasing significantly with increase in tube feeds; thus needing TPN     # Anemia 2/2 ESKD  On Venofer 50 qwk, Mircera last dose 1/9/2024  - hgb 10's  - Will continue Venofer 50 mcg q week (Tuesday)  - continue epogen 8000 units via HD    # BMD:   - Ca 8.8, phos 3.7, alb 3.5  - sevelamer on hold       Recommendations were communicated to primary team via note and phone    RABIA Moctezuma   Division of Renal Disease and Hypertension  [phone]      Interval History :   Seen at bedside, on bipap however mask very loose and leaking on all sides. RT now getting better mask. Paged this afternoon and pt is doing worse, CXR with more pulm edema, requesting extra HD run today. Ordered 2 hrs for 2 L. Will dialyze on bicarb 32 (not higher since this only worsens pt's resp acidosis). No n/v, CP, chills    Review of Systems:   4 point ROS neg other than as noted above    Physical Exam:   I/O last 3 completed shifts:  In: 560 [NG/GT:210]  Out: -    BP 98/52 (BP Location: Right arm)   Pulse 99   Temp 98  F (36.7  C) (Oral)   Resp 20   Ht 1.57 m (5' 1.81\")   Wt 46.2 kg (101 lb 13.6 oz)   SpO2 99%   BMI 18.74 kg/m       GENERAL APPEARANCE: NAD  PULM: on bipap  CV: regular rhythm, and rate    1+ pedal/ankle  GI: soft   INTEGUMENT: no cyanosis, no rashes on " exposed skin  NEURO: alert and oriented   Access Left AVG     Labs:   All labs reviewed by me  Electrolytes/Renal -   Recent Labs   Lab Test 03/18/24  0651 03/17/24  1835 03/17/24  0328 03/16/24  2045 03/16/24  0800 03/16/24  0651     --  141 137  --  137   POTASSIUM 5.4*  --  3.9 4.2  --  4.5   CHLORIDE 101  --  103 100  --  101   CO2 21*  --  27 26  --  24   BUN 88.2*  --  53.4* 38.9*  --  79.4*   CR 4.26*  --  2.96* 2.52*  --  3.90*   * 80 74 114*   < > 104*   ESTUARDO 9.3  --  8.8 8.6*  --  8.9   MAG 2.4*  --  2.2 1.6*  --  1.9   PHOS 6.7*  --  3.7  --   --  5.4*    < > = values in this interval not displayed.       CBC -   Recent Labs   Lab Test 03/18/24  0651 03/16/24  0651 03/14/24  0553   WBC 10.1 7.7 6.2   HGB 10.7* 10.0* 9.6*    203 212       LFTs -   Recent Labs   Lab Test 03/18/24  0651 03/17/24  0328 03/16/24  0651   ALKPHOS 158* 147 121   BILITOTAL 0.3 0.2 0.3   ALT <5 12 <5   AST 19 16 17   PROTTOTAL 6.1* 5.9* 5.9*   ALBUMIN 3.6 3.5 3.4*       Iron Panel -   Recent Labs   Lab Test 09/26/22  0555 09/03/22  1039 08/24/22  0810   IRON 54 21* 41   IRONSAT 22 9* 21   CARLOS 769* 343* 334*         Imaging:  All imaging studies reviewed by me.     Current Medications:   calcium carbonate-vitamin D  1 tablet Per J Tube TID w/meals    cyanocobalamin  500 mcg Per Feeding Tube Daily    cycloSPORINE modified  200 mg Per G Tube BID IS    fidaxomicin  200 mg Oral BID    heparin ANTICOAGULANT  5,000 Units Subcutaneous Q12H    heparin lock flush  5-20 mL Intracatheter Q24H    levalbuterol  1.25 mg Nebulization 2 times daily    levothyroxine  25 mcg Oral QAM AC    lidocaine  1 patch Transdermal Q24h    liothyronine  2.5 mcg Oral BID    [Held by provider] metoprolol  25 mg Per J Tube BID    OLANZapine zydis  5 mg Oral At Bedtime    pantoprazole  40 mg Per J Tube BID    predniSONE  5 mg Per J Tube QAM    And    predniSONE  2.5 mg Per J Tube QPM    [Held by provider] sevelamer carbonate (RENVELA)  0.8 g Oral  BID    sodium chloride (PF)  10 mL Intracatheter Q8H    sodium chloride (PF)  10-40 mL Intracatheter Q8H    sulfamethoxazole-trimethoprim  1 tablet Oral Q Mon Wed Fri AM      amiodarone Stopped (03/17/24 7059)    dextrose      - MEDICATION INSTRUCTIONS -      - MEDICATION INSTRUCTIONS -      sodium chloride 0.9%       RABIA Moctezuma

## 2024-03-18 NOTE — PROVIDER NOTIFICATION
2335: Updated [facility] [place] overnight provider regarding consistent SBP < 90 and that amiodarone drip was paused. MD assessed at bedside and discussed with nursing. Will keep amio stopped per provider.

## 2024-03-18 NOTE — PROGRESS NOTES
Transfer  Transferred to: 4E  Via:bed  Reason for transfer:Pt no longer appropriate for 6B- worsened patient condition  Family: Daughter Charity Called   Belongings: Packed and sent with pt  Chart: Delivered with pt to next unit  Medications: Meds sent to new unit with pt  Report given to: RIYA Silver  Pt status: at Dialysis

## 2024-03-18 NOTE — PLAN OF CARE
Neuro: A&Ox4, Lethargic and foggy at times, suspicious and irritable   Cardiac: SR. Soft BPs 90/50s 250ml bolus LR    Respiratory: Sating  on BIPAP 30-40%, pt refusing to wear BIPAP for longer then 20-30mins   GI/: No urine output over shift, on dialysis. BM last 3/17  Diet/appetite: Tolerating Renal diet. Poor appetite. Jtube with feeds at 35ml/hr   Activity:  Assist of x2 turning in bed, did not get out of bed this AM   Pain: At acceptable level on current regimen.   Skin: pressure injury on coccyx   LDA's:  R PICC x2 lumens and L AV fistula     Plan: Extra run of dialysis and check VBG after. Transfer to ICU for closer monitoring.

## 2024-03-18 NOTE — PLAN OF CARE
"BP 99/50   Pulse 100   Temp 98.7  F (37.1  C) (Oral)   Resp 20   Ht 1.57 m (5' 1.81\")   Wt 46.2 kg (101 lb 13.6 oz)   SpO2 96%   BMI 18.74 kg/m      Shift events: Converted to SR around 2100. Amio gtt stopped around 2330 due to soft BP. Team updated. BP remained soft overnight and pt notably drowsy although was easily roused and oriented. After much encouragement and education, wore BIPAP for ~2 hours overnight. AM blood gases currently pending.     Neuro: A&Ox4  CV: SR with increasing rate overnight, now ST with HR in 100s. Soft BP, SBP in 90s and MAP ~65.  Resp: 2 L NC overnight for intermittent desats while sleeping. Denies SOB. BIPAP for ~2 hours.  GI: No BM overnight. Uses commode. PEG/J.  : Oliguric. HD on TTS.  Diet/nutrition: Renal diet. TF at 35 mL/hr goal with 30 mL FWF q 4 hours..   Skin: No new deficits noted.  Activity: SBA.  LDA: PICC. PEG/J.     Plan: Continue to monitor CV status.    Addendum: VBG pH was 7.08 and pCO2 was 93 this AM. Team updated. Re-attempting to place pt on BIPAP with oncoming nurse.        "

## 2024-03-18 NOTE — PROGRESS NOTES
Calorie Count  Intake recorded for: 3/17  Total Kcals: 644 Total Protein: 24g  Kcals from Hospital Food: 644   Protein: 24g  Kcals from Outside Food (average):0 Protein: 0g  # Meals Ordered from Kitchen: 3 meals  # Meals Recorded: 1 meal (100% cheeseburger w/ torres and ketchup, 1 bag of chips)  # Supplements Recorded: 0

## 2024-03-18 NOTE — PROGRESS NOTES
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 On 2/17/24 she was transferred to ICU for worsening mental status and acute hypoxic and hypercarbic respiratory failure inspite of BiPAP requiring intubation. She was suspected to have PNA and started on antibiotics. Extubated on 2/19 without complications and tolerated iHD on 2/20, and tolerated PO regular diet in addition to TPN. Developed progressively worsening respiratory acidosis and hypercarbia, and was re-admitted to ICU on 2/28/24 for close monitoring of intermittent BiPAP and possible intubation, however she improved on AVAPS setting and well enough to transfer back to medicine floor on 2/29/24. Currently working on balancing volume status with current TPN plan, improving ventilation, and GOC/disposition planning. Respiratory acidosis with significant hypercapnia remain.     Changes today:   -Patient returned to [facility] around 2100 last night. Amiodarone drip discontinued  -VBG: Initial AM results= pH-7.08, PCO2-93, PM: pH-7.05, PCO2- 102 from AM labs  -Nephrology recs:  Continue HD  on TTS schedule, extra run today for worsening pulm edema    -Recheck VBG and BMP after dialysis   -Patient needs to be on BiPAP as much as possible  -Labs for neuropathic pain pending  -TFs at current goal rate of 35 mL/hr   -TPN discontinued   -Continue to monitor BPs  -Patient will be transferred to MICU- due to worsening respiratory acidosis       #Severe respiratory acidosis, improved on BiPAP  #AHRF (resolved)  #Acute hypercarbic respiratory failure  #S/P Lung transplant 2022 c/b right hemidiaphragm palsy  #Recurrent pneumonia, resolved  Patient received a bilateral lung transplant 6/28/22 for COPD. Was admitted 2/10 to address malnutrition and admitted to ICU on 2/17 with hypoxic hypercarbic respiratory failure. Intubated on 2/18 AM  due to worsening oxygen requirements, likely due to pulmonary edema given hx of ESRD requiring HD vs infection given hx of lung transplant, immunosuppressed status, and recurrent pneumonias. Extubated on 2/19 without complications. Has had issues with rising pCO2 that is responsive to BiPAP, but due to refusal, has required transferred to ICU (on 2/28 AM). Neurology consulted and MRI r/o central cause problem for respiratory drive. Started on AVAPS with improvement in mental status and VBG, and patient transferred back to medicine floor on 2/29. Patient has been using BiPAP but gets claustrophobia while using it. Trial low dose olanzapine, ok to use low dose benzo if needed  -VBG on 3/18 x2 with worsening acidosis (7.08/93 AM and 7.05/102 PM) likely due to poor tolerance wearing AVAPS, leaving it on for only short periods of time  - Extra dialysis for acidosis 3/18  - PRN hypertonic saline inhaler with albuterol nebs  - Transplant pulmonology following, recs appreciated  - PTA immunosuppressive agent  >Prednisone 5 mg every morning, 2.5 mg every afternoon; Stop tacrolimus 3/10 and replaced with Cyclosporin 200mg BID (3/11-*)    > overnight oximetry study suggestive of O2 vs CPAP  need, as did require up to 2LPM overnight to prevent hypoxia  > Try to minimize O2 to preserve respiratory drive, will give IVIG for DSA+   - avoid HFNC, maintain minimum oxygen to keep SaO2 >85%   - continue AVAPS when sleeping (overnight and during naps)  - MIP/MEP testing significantly decreased from previous--> consult Neuro; No neuromuscular cause of pCO2 retention  - Stopped PTA dapsone MWF for PJP prophylaxis; replaced with Bactrim (3/11)    - Recommend transfer to ICU service 3/18  - Limit medications that would depress respiration, ok to trial low dose benzo  - D/c'd theophylline 3/3/24 due to difficulty attaining therapeutic levels (started by ICU)  - continue thyroid function as below  - repeat metabolic CART study when stable on enteral feeds, off TPN (per TxPulm)  - sleep clinic eval at discharge  - CXR on 3/18: Increased diffuse interstitial and basilar predominant airspace opacities, likely edema/atelectasis versus possible infection.     Antibiotics:  Vancomycin (2/17 - 2/17)  Zosyn (2/17 - 2/23) for HAP  Bactrim MWF, PJP ppx (previously on Dapsone)  Micafungin (2/18- 2/21)  Fidaxomicin (3/13-*3/22)     Immunosuppression  Cyclosporin (previously on Tacro)  Prednisone     #A-fib, A-flutter (recurrent on 3/17)  Noted atrial fibrillation on arrival with HR elevated at 150, not sustained for > 10 minutes and otherwise hemodynamically stable. RVR resolved w/o medications.  PTA metoprolol (25mg BID) has been held through much of admission. On 3/17 new Aflutter developed overnight. HR 150s with flutter waves on EKG.. Metoprolol administered along with Mg infusion with minimal response. Patient was then given amiodarone bolus and placed on drip which now slightly lowered her HR in 120-130s. Improved.   - PTA metoprolol 25mg BID had been held, lower dose (12.5 BID) restarted 3/16, held for MAP <65 3/18  - Continuous telemetry  - Amiodarone drip discontinued     #Severe malnutrition   #FTT    #Hypoalbuminemia  #Gastroparesis, small bowel dysmotility  #S/P PEG/J with intolerance of enteral nutrition  # Chronic osmotic diarrhea/SIBO s/p Rifaximin  #Recurrent C.Diff (3rd ep)    Patient with gastroparesis (presumed due to vagal injury) and small bowel dysmotility complicated by unintentional 40lb weight loss over the past year and now severe malnutrition. Previously intolerant to oral food intake due to nausea. Was initially admitted for portacath and TPN initiation since 2/13. After extubation has been able to tolerate feeds without n/v from 2/20. Electrolytes trending towards baseline today.  Per GI, next steps for workup for malnutrition would include CT enterography to evaluate for anatomic abnormalities contributing to malnutrition vs possible treatment for SIBO (though patient has had multiple courses of treatment in the past with no long term improvement). Patient couldn't tolerate the oral load for CT enterography on 2/21, so will defer this until she is able to tolerate greater PO load. On 2/23 , again discussed with patient the need of small bowel motility study if not improving outpatient through [place]. No ongoing concerns for SIBO on 2/23 as patient is passing multiple episodes of stools and gas with no abdominal pain or distention. C-diff test negative on 2/21. Per RD, patient tolerating >300 kcal of intake PO currently, so stopped trickle Tube feeds and continuing with PO and TPN for nutrition (sufficient for preventing gut atrophy).  Tunneled CVC removed on 3/7 without complications, after PICC placement. As of 3/11 transplant pulmonology is worried that TPN is not a realistic plan to continue at home and would like to transition back to TF (RD oncerned pt is not absorbing any oral intake). Transplant pulm aslo worried that her tacro and dapson could be contributing to diarrhea (mucosal toxicity). Repeat enteric studies showed recurrent C.diff;  Fidaxomicin x 10 days started (GI approved).  Transplant pulm still requesting repeat colonoscopy w/ Bx after completion of c.diff treatment, to  r/o toxicity or other reason that diarrhea is present.  - Regular diet +  Restarted tube feeds- (with goal of 40 mL/hr) per transplant pulm request   -Nutrition consulted, they have discussed plan with GI; TPN discontinued 3/16  - GI consulted/signed off; appreciate recs   -PO Fidaxomicin 200 mg BID for 10 days- diarrhea has improved since start of tx    -May reconsult GI after tx of C.diff for future colonoscopy if diarrhea returns   - May benefit from small bowel motility study if not improving outpatient through [place].   - Nephrology consulted; appreciate recs     #B/L Neuropathic Pain   New pain b/l over the past few days. Last A1c <4.2 (7/11/23). End stage renal dz can cause it too, but will check for vitamin/mineral deficiencies. Medications can also be a culprit, and unsure if  fidaxomicin  has a side effect of neuropathy. Will evaluate more. TSH wnl. B12 elevated. B6, copper, B1, B3, zinc pending   -Consider gabapentin in the coming days   -Neuro Recs:  -No obvious clinical history or exam findings to suggest neurologic/neuromuscular causes of respiratory weakness  -Neuropathy labs currently pending    #Acute on chronic anemia 2/2 ESRD  Hgb stable 7-8s, with no acute signs of bleeding. Aute drop 2/29 from 8.2 > 6.6 after two rechecks, no overt signs of bleeding; required 1U pRBC transfusion.    - continue epogen per nephrology with dialysis  - venofer 50 mcg qweek with dialysis     #ESRD on HD  #Hypervolemic hyponatremia  #Anion gap metabolic acidosis - resolved  #mild hyperphosphatemia  Patient is ESRD on T/Th/Sat HD as outpt, Hgb stabilizing. HD tolerating well. Due to TPN vol, pt requires 4x/wk dialysis (2hr UF on M; HD T/TH/Sat 3hrs). This new frequency of dialysis does make it challenging for placement or going home. Patient transitioned off TPN, able to return to three-day a week scheduling per  nephrology.  - HD run today 3/18 due to worsening pulmonary edema  - Continue Venofer injections    - CBC and CMP daily  - Continue epogen dose 8000 units as per nephrology  - Strict I/Os  - HD per nephrology given hypervolemia; Plan for TTS HD    # Elevated TSH and low T4 and T3  Patient with new low T4 at 0.64 and TSH at 6.5, concerning for new hypothyroidism vs sick thyroid syndrome. TSH with appropriate response, more consistent with elevation in the setting of acute illness. ICU team on 2/28 recommended initiation of treatment for possible hypothyroid as this can improve respiratory muscle function in the setting of weakness and malnutrition.   - continue liothyronine and levothyroxine per ICU team recommendations  - repeat thyroid function 3/7 wnl    #Left upper extremity unilateral edema, improving   #Bilateral pedal and ankle edema, improving  Edema due to third spacing due to hypoalbuminemia vs HF. BNP >54966 at admission, Echo on 2/18 shows EF of 55-60% with collapsible IVC. Extremity edema 2/2 to hypoalbuminemia. Upper limb duplex USG on 2/18 negative for DVT.  USG left arm on 2/21 shows steel physiology and Vascular Surgery consulted (see below). Overall edema in extremities have improved significantly since initiating 4x/wk dialysis.    - Continue daily weights  - Strict I/Os  - Elevation and wrapping of only lower legs as needed and increased UF per nephrology for volume management; no wrapping of Left upper extremity with dialysis fistula  - lymphedema consulted for BLE edema  - HD as noted above      #Steal physiology of LUE dialysis access fistula  LUE arterial US obtained 2/21 with concern for LUE edema. US of fistula demonstrated steal physiology. Discussed with nephrology, and vascular surgery consulted on 2/22. Vascular surgery has only minor concerns for steal symptoms and given that the AVF is working well, they are okay with outpatient fistulograms/ venoplasty with wrist brachial index and  PPG's, unless new concerns arise.  - plan for outpatient workup as above; nephrology in agreement with outpatient workup.     #Facial and neck bruising  #Pain  Reports soreness in her neck from lying in bed. No soreness in the buttocks/ back. Patient has multiple bruises over her arms and face which is attributed to previous falls. No new bruising reported today. Patients reports her headaches are improving with tylenol. Using heating pads and lidocaine patch for body pains. Couple of small skin tears noted by the Rn's. Skin care done accordingly.  - Tylenol Q4  - Lidocaine patch prn  - Heating pads prn  - Diligent skin cares     #Encephalopathy secondary to hypercarbia - resolved  Patient was progressively more lethargic on 2/17 during admission in [facility]. Etiology likely secondary to hypercarbia in context of respiratory failure as patient was noted to have high CO2s concomitant with lethargy. Though receiving oxycodone and fentanyl, lethargy was only partially alleviated by narcan. LFTs and ammonia wnl with low suspicion of hepatic encephalopathy. BUN wnl with low suspicion for uremic encephalopathy. Head CT on 2/18 was negative with low suspicion of acute intracranial pathology. Patient is awake, alert, oriented and following commands since 2/20.     # Right hip fracture s/p ORIF (December 2023)   - PT/OT    # GERD  - PTA PPI    # Hx EBV viremia   # Hypogammaglobulinemia  # Chronic immunosuppression 2/2 lung transplant  Patient has been afebrile and has not had leukocytosis however she is under immunosuppression. Given acute respiratory failure and hx of recurrent pneumonias, she was started on empiric abx for concerns over new pneumonia. RVP and cultures no growth to date. Last day of empirical treatment for HAP.  - EBV 27K (decreased compared to prior)     # RLE edema and pain - U/S DVT without DVT    #[facility]  Care conference on 3/15 with Transplant Pulm, [facility], Medicine, daughters (Charity Julia)  and Transplant SW. Overall medical updates provided and Qs answered. NIF/FVC has worsened but does not appear to be neuromuscular.  CO2 retention and nutrition remain the biggest concerns, pulmonology to work on new AVAPs mask, primary team will work on transitioning back to tube feeds. Likely would be able to discharge home later this week.   - Update 3/18: With declining respiratory acidosis on labs, discussed code status. Patient initially not desiring any escalation of her care to ICU/intubation with frustration re overall course. However, after discussing with her daughter on the phone she and family elected to remain full code and agreed to ICU admission and intubation if necessary       Lines/tubes/drains:  - PIV x2  - PEG/J  - HD AV fistula, left arm   - PICC for TPN        Diet: Renal Diet (dialysis)  Calorie Counts  Adult Formula Drip Feeding: Continuous Brandcast Renal Support; Jejunostomy; Goal Rate: 35 mL/hr (goal rate) held for 1 hour before/after Synthroid administration, per PharmD; mL/hr    DVT Prophylaxis: resume subQ heparin  Dong Catheter: Not present  Fluids:  PO  Lines: PRESENT      PICC 03/04/24 Double Lumen Right Basilic TPN. PICC okay to use.-Site Assessment: WDL  Hemodialysis Vascular Access Arteriovenous graft Superior Arm-Site Assessment: WDL;Thrill present      Cardiac Monitoring: ACTIVE order. Indication: Tachyarrhythmias, acute (48 hours)  Code Status: Full Code      Clinically Significant Risk Factors        # Hyperkalemia: Highest K = 5.4 mmol/L in last 2 days, will monitor as appropriate     # Hypomagnesemia: Lowest Mg = 1.6 mg/dL in last 2 days, will replace as needed   # Hypoalbuminemia: Lowest albumin = 2.6 g/dL at 2/18/2024  5:13 AM, will monitor as appropriate             # Severe Malnutrition: based on nutrition assessment      # Financial/Environmental Concerns: none         Disposition Plan    Patient seen and discussed with Dr. Frantz Fishman,  DDS  OMFS Resident on [place] 1 Medicine service  [facility]  Securely message with [employer] (more info)  Text page via [facility] Paging/Directory   See signed in provider for up to date coverage information  ______________________________________________________________________    Interval History   NAEO. Reviewed overnight nursing notes. HR back into the 80s/90s. Amiodarone discontinued. Patient on BiPAP this AM during encounter. No questions or concerns. States she does not enjoy wearing the mask. Pt denies any significant chest pain or shortness of breath.  Diarrhea improving. States she is very tired today Denies any other constitutional symptoms.     Physical Exam   Vital Signs: Temp: 98  F (36.7  C) Temp src: Oral BP: 98/52 Pulse: 99   Resp: 20 SpO2: 99 % O2 Device: BiPAP/CPAP Oxygen Delivery: 2 LPM  Weight: 101 lbs 13.64 oz  General: thin,sitting up in bed comfortably, no acute distress this morning. Very pleasant  HEENT: EOMI, NC in place   Pulm/Resp: breathing comfortably on RA, CTAB. No cough  CV: normal rate, regular rhythm. No LE edema  Neuro: alert and following commands, answering questions clearly and easily, moving all extremities.    Medical Decision Making       Please see A&P for additional details of medical decision making.      Data     I have personally reviewed the following data over the past 24 hrs:    10.1  \   10.7 (L)   / 213     138 101 88.2 (H) /  108 (H)   5.4 (H) 21 (L) 4.26 (H) \     ALT: <5 AST: 19 AP: 158 (H) TBILI: 0.3   ALB: 3.6 TOT PROTEIN: 6.1 (L) LIPASE: N/A     INR:  1.10 PTT:  N/A   D-dimer:  N/A Fibrinogen:  N/A       Imaging results reviewed over the past 24 hrs:   Recent Results (from the past 24 hour(s))   XR Chest Port 1 View    Narrative    Exam: XR CHEST PORT 1 VIEW  3/18/2024 10:07 AM     History:  s/p lung transplant, interval monitoring, hypercapnia       Comparison:  3/5/2024    Findings: Single view of the chest.  Postsurgical changes of the chest  with intact clam shell sternotomy wires. Right upper extremity PICC  tip projects over the low SVC. Stable cardiomediastinal silhouette.  Similar small pleural effusions, left greater than right. Pulmonary  artery convexity is somewhat enlarged as is left atrial appendage  convexity of the left mediastinal border. No pneumothorax. Diffuse  interstitial prominence with basilar predominant air space opacities,  increased.      Impression    Impression:   1. Increased diffuse interstitial and basilar predominant airspace  opacities, likely edema/atelectasis versus possible infection.  2. Stable small pleural effusions.  3. Mild pulmonary enlargement as present on recent chest CT. Also,  left atrial appendage enlargement.    I have personally reviewed the examination and initial interpretation  and I agree with the findings.    ALVINA HARRY MD         SYSTEM ID:  [id number]

## 2024-03-18 NOTE — PROGRESS NOTES
Date: 3/18/2024  Time: 1610     Data:  Pre Wt:   46.2 kg as of 3/17/24  Desired Wt:   To be established  Post Wt:  40.2 kg (estimated)  Weight change: - 0.6 kg  Ultrafiltration - Post Run Net Total Removed (mL):  600ml  Vascular Access Status: Graft patent  Dialyzer Rinse:  Light  Total Blood Volume Processed: 30.2 L   Total Dialysis (Treatment) Time:   2 Hrs  Dialysate Bath: K 2, Ca 3  Heparin: Heparin: None     Lab:   Yes    BMP    Interventions:  Dialysis done through L AV Graft using 15 gauge needles.  Initially set UF to 2L. UF limited d/t hypotension. PRN albumin given 1x, able to pull 0.6, accommodating priming and rinse back volume. Treatment ended 10 minutes early d/t consistent high venous and TMP alarms. Rinseback done proactively done to avoid clotting.     ,     No meds given per dialysis.  See Flowsheet for Crit Profile throughout the run    Treatment has ended safely and  blood is rinsed back completely  Decannulation done post HD, hemostasis is achieved in 10 minutes  Pressure dressing is applied, to be removed after 4-6 hours.  Report given to PCN (ICU ).  Assessment:  On BIPAP satting @90-100sA/O x 4, calm and cooperative, denies pain  L arm AV Fistula has good thrill and bruit                Plan:    Per Renal team        MISAEL VazN, RN  Acute Dialysis RN  [facility]

## 2024-03-18 NOTE — PROGRESS NOTES
Pulmonary Medicine  Cystic Fibrosis - Lung Transplant Team  Progress Note  2024     Patient: Sofie Rodriguez  MRN: [id number]  : 1960 (age 63 year old)  Transplant: 2022 (Lung), POD#629  Admission date: 2/10/2024    Assessment & Plan:     Sofie Rodriguez is a 63 year old female with h/o COPD s/p BSLT () with course complicated by post-operative hemidiaphragm palsy, recurrent PNAs, positive DSA, EBV viremia, hypogammaglobulinemia, severe gastroparesis s/p G/J tube placement (22) and pyloric botox (23), GI bleed /2 pyloric ulcer, hemobilia s/p ERCP and MRCP, chronic diarrhea, recurrent C diff colitis, ESRD on iHD, recurrent falls with injuries (recent right hip fx s/p ORIF 2023), deconditioning, and FTT.  Admitted 2/10 for initiation of TPN/lipids.  Transferred to ICU  for emergent intubation for hypoxic and hypercapneic respiratory failure with severe respiratory acidosis and encephalopathy.  iHD increased, infectious workup unremarkable. Extubated . Persistent and progressive hypercapnia with severe acidosis, returned to ICU - d/t tenuous respiratory status. Improvement noted when able to tolerate NIPPV. Several medication changes made 3/11 to evaluate effects on diarrhea. C diff positive 3/13. Transferred to ICU 3/18 d/t worsening respiratory acidosis.     Today's recommendations:  - Agree with transfer to MICU, and potential intubation given worsening respiratory acidosis  - AVAPS continuous for now as tolerated, anxiolytics PRN to help with tolerance (Nasal mask provided on 3/18 by Dr. Franks but informed per RT that unable to use inpatient)  - VBG monitoring per MICU  - Defer ABX at this time given no infectious signs and symptoms (WBC rising, monitor for now), afebrile, no SOB or cough. Low threshold to start empiric antibiotics with worsening WBC or fever.  - Cyclosporine level today therapeutic, no dose adjustment. Repeat level 3/21, ordered  - Would repeat  metabolic cart once on stable enteral and PO feeds and off TPN for several days; elevated RQ suggests possible overfeeding when on TPN  - Trial low rate TF (tapered off TPN) (defer to primary team and dietitians)  - Evaluating for vitamin deficiencies: copper, zinc, folate, Thiamine pending from 3/16 in setting of peripheral neuropathy  - Repeat CMV and EBV 3/18 pending  - Repeat Prospera 3/18 pending, DSA due 4/6  - Encourage ongoing Palliative service support   - Agree with additional run of dialysis today for volume removal     Acute on chronic hypoxic/hypercapneic respiratory failure:  S/p bilateral sequential lung transplant for COPD:   Hypoventilation, Suspected CARLEE: CT PTA (2/7) with decreased MARLON opacities but new tree in bud RLL opacities.  PFTs unchanged in clinic (but ATS criteria not met), remain significantly below her baseline.  Baseline hypoxia with 2L NC overnight.  Respiratory decompensation with encephalopathy and severe respiratory acidosis on 2/17.  S/p intubation 2/17-2/19.  Repeat CT (2/17) with new patchy consolidative and nodular opacities, GGO primarily in the bases and intralobular septal thickening (concerning for pulmonary edema given recent addition of TPN nutrition, new infection.  Bronch with lavage of RML (2/18) with very friable tissue, cutlures only with C. glabrata.  S/p empiric Zosyn (2/17-2/24) and micafungin (2/18-2/21).  Severe respiratory acidosis with hypercapnia persisting with slight improvement with NIPPV treatment.  Persistent respiratory failure  also complicated by  hypoventilation, and deconditioning d/t malnutrition. Intermittent tolerance of NIPPV, some improvement with transition to AVAPS. Neurology consulted 2/27. Transferred to ICU 2/28 given tenuous respiratory status and potential need for reintubation.. Improvement noted with continuous NIPPV with minimal interruptions (sodium bicarb also stopped, likely partially contributing), trial off NIPPV during the day  started 2/29. MRI brain (3/1) with moderate leukoaraiosis, no acute intracranial pathology.  SNIFF testing performed 3/8- Movement of bilateral hemidiaphragm with respiration without paradoxical movement. Currently wearing AVAPS for several hours per night PCO2 began decreasing with use and with decrease in feeds. Metabolic CART suggesting overfeeding on TPN. However, NIF and FVC continue to downtrend over 3/5-3/15, prompting concern for potential neuromuscular cause.  Overall, her PCO2 retention is likely multifactorial with a component being from overfeeding, inability to compensate due to renal failure and bicarb loss with diarrhea, some portion of hypoventilation with declining FVC concerning for neuromuscular etiology and inability to consistently tolerate AVAPs at night due to claustrophobia.    - VBG (3/18) with worsening respiratory acidosis (7.08/93 on 3/18 ->7.05/102 3/18 at noon), likely 2/2 poor tolerance to wearing AVAPS (frequently removing mask), and with pulmonary edema on CXR  - Neuro consulted 3/17 with no obvious clinical history or exam findings to suggest neurologic/neuromuscular causes of respiratory weakness reassessment (see note for more info) in setting of worsening peripheral neuropathy and declining NIF and FVC:   Date FVC NIF   3/5 830 -40   3/14 500 -25   3/15 400 -33     - Agree with transfer to MICU, and potential intubation given worsening respiratory acidosis  - AVAPS continuous for now as tolerated, anxiolytics PRN to help with tolerance (Nasal mask provided on 3/18 by Dr. Franks but informed per RT that unable to use inpatient)  - VBG monitoring per MICU  - CXR today with increased diffuse interstitial and basilar predominant airspace opacities, likely edema/atelectasis, lower suspicion for infection, stable small pleural effusions (personally reviewed)  - Xopenex BID (2/27)  - Defer ABX at this time given no infectious signs and symptoms (WBC rising, monitor for now), afebrile, no  SOB or cough. Low threshold to start empiric antibiotics with worsening WBC or fever.  - Sleep clinic eval indicated as OP  - Palliative following     Immunosuppression:  ImmuKnow low at 108 on 1/10.  AZA previously stopped 5/2023 d/t low ImmuKnow assay and EBV viremia.  Repeat ImmuKnow (2/27) low at 88.  Change from [place] to CSA 3/11.  -  mg BID. Goal 140-170.  Level 3/18 therapeutic at 140 (13 hr level), no dose adjustment. Repeat level 3/21, ordered  - Prednisone 5 mg qAM / 2.5 mg qPM     Prophylaxis:   - Discontinue dapsone (3/11). Start bactrim single strength qM/W/F.  If diarrhea persists, change to pentamidine.  - CMV ppx not currently indicated, D+/R+, CMV negative 2/19 (BAL very mildly positive 2/18, likely not clinically significant), repeat CMV 3/18, pending     Positive DSA: PFTs and pulmonary symptoms have remained stable as OP, so AMR treatment deferred given frailty.  DSA DQB2 mfi [id number] on 2/7 and stable to decreased on 3/6 5667.  Most recent cell-free DNA Prospera (2/18) mildly elevated at 1.04 (concerning for possible rejection), which was increased from prior level of 0.12 (1/10).  IST increase deferred at that time per Dr. Gong.  S/p IVIG (2/27) for DSA (IgG WNL).  - Repeat DSA 4/6 (not ordered)  - Repeat Prospera 3/18, pending      EBV viremia: CT CAP (2/7) without lymphadenopathy.  Most recent level (2/18) improved to 28k from 96k (2/7)  - Repeat EBV 3/18 pending     Other relevant problems being managed by the primary team:      FTT:  Severe protein calorie malnutrition:  Gastroparesis s/p PEG/J, botox, and G-POEM:  SB hypomotility:  Pyloric ulcer:  Chronic nausea and osmotic diarrhea:  SIBO s/p rifaximin:   Recurrent C diff colitis: Chronic osmotic and infectious diarrhea since transplant with recurrent episodes of C diff.  Notable weight loss (40# in a year) d/t diarrhea, GI dysmotility, and intolerance of enteral feeds (PEG/J tube in place), most recently on elemental formula.   Extensive OP eval and f/u with GI.  S/p port placement for TPN and lipids. Despite 5 weeks of inpatient TPN she demonstrated no true improvement in nutrition, strength or diarrhea and TPN will not be a feasible option for home. Changed immunosuppressants, treating C diff and retrialing enteral nutrition and as tolerated PO intake.   - Retrialing low dose enteral nutrition   - Encourage PO nutrition and oral spupplements.  - C diff positive yet again although do not think this is the sole cause of her recurrent diarrhea, on fidaxomicin as of 3/13   -- Consider colonoscopy if no improvements with above   - Would repeat metabolic cart once on stable enteral and PO feeds and off TPN for several days; elevated RQ suggests possible overfeeding when on TPN     Peripheral Neuropathy: As of 3/15 she notes an initiation and increase in pins and needle sensation in her feet which seems to be worsening. It could be immunosuppression related, no hx of diabetes, but given the issues with nutrition would also be concerned for vitamin deficiency. B12 well replete  - Copper, folate, thiamine, B6 pending 3/16     ESRD: CT scan (with declining respiratory status) with volume overload secondary to TPN volume, iHD increased from PTA TThSa schedule with unsustained improvement.  Management per nephrology, dialysis via [facility].    - iHD 3x/week schedule (TTSa) will be three times per week once off TPN, per Nephrology      Goals of Care: Care conference held with palliative and primary team on 3/15. Ultimately, updated family (daughters) and Sofie regarding her overall status. She feels hopeful that these new changes will be helpful. We did discuss that at any point if she decides that what we're doing is not in alignment with her quality of life or goals of care that we can transition to a comfort based approach but for now she wants to continue full restorative cares.      We appreciate the excellent care provided by the [facility] team.  Recommendations communicated via this note and in person.  Will continue to follow along closely, please do not hesitate to call with any questions or concerns.    Patient discussed with Dr. Paul Grayson, APRN, CNP   Inpatient Nurse Practitioner  Pulmonary CF/Transplant     Subjective & Interval History:     Sleepy this morning, oriented but frequently falling back asleep during conversation. She denies headaches, dizziness, but notes brain fog. Encouraging on AVAPS but frequently removing mask d/t anxiety despite anxiolytics given. No SOB or cough.     Review of Systems:     C: No fever, no chills, no change in weight  INTEGUMENTARY/SKIN: No rash or obvious new lesions  ENT/MOUTH: No sore throat, no sinus pain, no nasal congestion or drainage  RESP: See interval history  CV: No chest pain, no palpitations, no peripheral edema, no orthopnea  GI: No nausea, no vomiting, no change in stools, no reflux symptoms  : No dysuria  MUSCULOSKELETAL: No myalgias, no arthralgias  ENDOCRINE: Blood sugars with adequate control  NEURO: see interval history  PSYCHIATRIC: Mood stable    Physical Exam:     All notes, images, and labs from past 24 hours (at minimum) were reviewed.    Vital signs:  Temp: 98  F (36.7  C) Temp src: Oral BP: 98/52 Pulse: 89   Resp: 20 SpO2: 100 % O2 Device: BiPAP/CPAP Oxygen Delivery: 2 LPM   Weight: 46.2 kg (101 lb 13.6 oz)  I/O:   Intake/Output Summary (Last 24 hours) at 3/18/2024 1341  Last data filed at 3/18/2024 1302  Gross per 24 hour   Intake 860 ml   Output --   Net 860 ml     Constitutional: lying in bed, in no apparent distress.   HEENT: Eyes with pink conjunctivae, anicteric.  Oral mucosa moist without lesions.   PULM: Good air flow bilaterally.  No crackles, no rhonchi, no wheezes.  Non-labored breathing on 2L NC.  CV: Normal S1 and S2.  RRR.  No murmur, gallop, or rub.  No peripheral edema.   ABD: NABS, soft, nontender, nondistended.    MSK: Moves all  "extremities.  No apparent muscle wasting.   NEURO: Alert and conversant.   SKIN: Warm, dry.  No rash on limited exam.   PSYCH: Mood stable.     Data:     LABS    CMP:   Recent Labs   Lab 03/18/24  0651 03/17/24  1835 03/17/24  0328 03/16/24  2045 03/16/24  0800 03/16/24  0651 03/15/24  0554     --  141 137  --  137 137   POTASSIUM 5.4*  --  3.9 4.2  --  4.5 4.0   CHLORIDE 101  --  103 100  --  101 100   CO2 21*  --  27 26  --  24 26   ANIONGAP 16*  --  11 11  --  12 11   * 80 74 114*   < > 104* 110*   BUN 88.2*  --  53.4* 38.9*  --  79.4* 47.2*   CR 4.26*  --  2.96* 2.52*  --  3.90* 2.72*   GFRESTIMATED 11*  --  17* 21*  --  12* 19*   ESTUARDO 9.3  --  8.8 8.6*  --  8.9 8.7*   MAG 2.4*  --  2.2 1.6*  --  1.9 1.7   PHOS 6.7*  --  3.7  --   --  5.4* 3.5   PROTTOTAL 6.1*  --  5.9*  --   --  5.9* 5.9*   ALBUMIN 3.6  --  3.5  --   --  3.4* 3.6   BILITOTAL 0.3  --  0.2  --   --  0.3 0.3   ALKPHOS 158*  --  147  --   --  121 117   AST 19  --  16  --   --  17 17   ALT <5  --  12  --   --  <5 8    < > = values in this interval not displayed.     CBC:   Recent Labs   Lab 03/18/24  0651 03/16/24  0651 03/14/24  0553 03/12/24  0613   WBC 10.1 7.7 6.2 6.3   RBC 3.00* 2.80* 2.62* 2.59*   HGB 10.7* 10.0* 9.6* 9.7*   HCT 37.8 34.8* 32.3* 32.1*   * 124* 123* 124*   MCH 35.7* 35.7* 36.6* 37.5*   MCHC 28.3* 28.7* 29.7* 30.2*   RDW 18.5* 19.0* 20.0* 20.3*    203 212 227       INR:   Recent Labs   Lab 03/18/24  0651   INR 1.10       Glucose:   Recent Labs   Lab 03/18/24  0651 03/17/24  1835 03/17/24  0328 03/16/24  2045 03/16/24  0800 03/16/24  0651   * 80 74 114* 115* 104*       Blood Gas:   Recent Labs   Lab 03/18/24  1229 03/18/24  0841 03/18/24  0652   PHV 7.05* 7.08* 7.08*   PCO2V 102* 97* 93*   PO2V 72* 59* 78*   HCO3V 28 29* 27   LINDY -4.1* -2.7 -4.4*   O2PER 40 40 32       Culture Data No results for input(s): \"CULT\" in the last 168 hours.    Virology Data:   Lab Results   Component Value Date    " FLUAH1 Not Detected 02/18/2024    FLUAH3 Not Detected 02/18/2024    NY3949 Not Detected 02/18/2024    IFLUB Not Detected 02/18/2024    RSVA Not Detected 02/18/2024    RSVB Not Detected 02/18/2024    PIV1 Not Detected 02/18/2024    PIV2 Not Detected 02/18/2024    PIV3 Not Detected 02/18/2024    HMPV Not Detected 02/18/2024       Historical CMV results (last 3 of prior testing):  Lab Results   Component Value Date    CMVQNT Not Detected 02/19/2024    CMVQNT Not Detected 02/07/2024    CMVQNT Not Detected 01/10/2024     Lab Results   Component Value Date    CMVLOG 3.2 07/12/2023    CMVLOG <2.1 04/19/2023    CMVLOG 3.5 01/25/2023       Urine Studies    Recent Labs   Lab Test 02/18/24  0222 05/18/23  0627   URINEPH 7.5* 5.0   NITRITE Negative Negative   LEUKEST Trace* Moderate*   WBCU 66* 21*       Most Recent Breeze Pulmonary Function Testing (FVC/FEV1 only)  FVC-Pre   Date Value Ref Range Status   02/07/2024 1.19 L    01/10/2024 1.12 L    08/29/2023 1.48 L    07/25/2023 1.55 L      FVC-%Pred-Pre   Date Value Ref Range Status   02/07/2024 42 %    01/10/2024 39 %    08/29/2023 53 %    07/25/2023 55 %      FEV1-Pre   Date Value Ref Range Status   02/07/2024 1.13 L    01/10/2024 1.10 L    08/29/2023 1.43 L    07/25/2023 1.54 L      FEV1-%Pred-Pre   Date Value Ref Range Status   02/07/2024 51 %    01/10/2024 49 %    08/29/2023 64 %    07/25/2023 69 %        IMAGING    Recent Results (from the past 48 hour(s))   XR Chest Port 1 View    Narrative    Exam: XR CHEST PORT 1 VIEW  3/18/2024 10:07 AM     History:  s/p lung transplant, interval monitoring, hypercapnia       Comparison:  3/5/2024    Findings: Single view of the chest. Postsurgical changes of the chest  with intact clam shell sternotomy wires. Right upper extremity PICC  tip projects over the low SVC. Stable cardiomediastinal silhouette.  Similar small pleural effusions, left greater than right. Pulmonary  artery convexity is somewhat enlarged as is left atrial  appendage  convexity of the left mediastinal border. No pneumothorax. Diffuse  interstitial prominence with basilar predominant air space opacities,  increased.      Impression    Impression:   1. Increased diffuse interstitial and basilar predominant airspace  opacities, likely edema/atelectasis versus possible infection.  2. Stable small pleural effusions.  3. Mild pulmonary enlargement as present on recent chest CT. Also,  left atrial appendage enlargement.    I have personally reviewed the examination and initial interpretation  and I agree with the findings.    ALVINA HARRY MD         SYSTEM ID:  [id number]

## 2024-03-19 LAB
ALBUMIN SERPL BCG-MCNC: 3.7 G/DL (ref 3.5–5.2)
ALLEN'S TEST: YES
ALP SERPL-CCNC: 131 U/L (ref 40–150)
ALT SERPL W P-5'-P-CCNC: 12 U/L (ref 0–50)
ANION GAP SERPL CALCULATED.3IONS-SCNC: 14 MMOL/L (ref 7–15)
AST SERPL W P-5'-P-CCNC: 24 U/L (ref 0–45)
BASE EXCESS BLDA CALC-SCNC: 4.9 MMOL/L (ref -3–3)
BASE EXCESS BLDV CALC-SCNC: -0.4 MMOL/L (ref -3–3)
BASE EXCESS BLDV CALC-SCNC: -0.6 MMOL/L (ref -3–3)
BASE EXCESS BLDV CALC-SCNC: 0.3 MMOL/L (ref -3–3)
BASE EXCESS BLDV CALC-SCNC: 5.5 MMOL/L (ref -3–3)
BASE EXCESS BLDV CALC-SCNC: 6.1 MMOL/L (ref -3–3)
BASE EXCESS BLDV CALC-SCNC: 6.9 MMOL/L (ref -3–3)
BASOPHILS # BLD AUTO: ABNORMAL 10*3/UL
BASOPHILS # BLD MANUAL: 0 10E3/UL (ref 0–0.2)
BASOPHILS NFR BLD AUTO: ABNORMAL %
BASOPHILS NFR BLD MANUAL: 0 %
BILIRUB SERPL-MCNC: 0.4 MG/DL
BUN SERPL-MCNC: 67 MG/DL (ref 8–23)
CALCIUM SERPL-MCNC: 8.9 MG/DL (ref 8.8–10.2)
CHLORIDE SERPL-SCNC: 99 MMOL/L (ref 98–107)
COHGB MFR BLD: 98.9 % (ref 96–97)
CREAT SERPL-MCNC: 3.6 MG/DL (ref 0.51–0.95)
DEPRECATED HCO3 PLAS-SCNC: 25 MMOL/L (ref 22–29)
EBV DNA COPIES/ML, INSTRUMENT: ABNORMAL COPIES/ML
EBV DNA SPEC NAA+PROBE-LOG#: 4.4 {LOG_COPIES}/ML
EGFRCR SERPLBLD CKD-EPI 2021: 14 ML/MIN/1.73M2
EOSINOPHIL # BLD AUTO: ABNORMAL 10*3/UL
EOSINOPHIL # BLD MANUAL: 0.3 10E3/UL (ref 0–0.7)
EOSINOPHIL NFR BLD AUTO: ABNORMAL %
EOSINOPHIL NFR BLD MANUAL: 4 %
ERYTHROCYTE [DISTWIDTH] IN BLOOD BY AUTOMATED COUNT: 18.2 % (ref 10–15)
GLUCOSE BLDC GLUCOMTR-MCNC: 106 MG/DL (ref 70–99)
GLUCOSE BLDC GLUCOMTR-MCNC: 115 MG/DL (ref 70–99)
GLUCOSE BLDC GLUCOMTR-MCNC: 123 MG/DL (ref 70–99)
GLUCOSE BLDC GLUCOMTR-MCNC: 142 MG/DL (ref 70–99)
GLUCOSE BLDC GLUCOMTR-MCNC: 164 MG/DL (ref 70–99)
GLUCOSE BLDC GLUCOMTR-MCNC: 165 MG/DL (ref 70–99)
GLUCOSE BLDC GLUCOMTR-MCNC: 91 MG/DL (ref 70–99)
GLUCOSE SERPL-MCNC: 114 MG/DL (ref 70–99)
HCO3 BLD-SCNC: 34 MMOL/L (ref 21–28)
HCO3 BLDV-SCNC: 30 MMOL/L (ref 21–28)
HCO3 BLDV-SCNC: 31 MMOL/L (ref 21–28)
HCO3 BLDV-SCNC: 31 MMOL/L (ref 21–28)
HCO3 BLDV-SCNC: 34 MMOL/L (ref 21–28)
HCO3 BLDV-SCNC: 35 MMOL/L (ref 21–28)
HCO3 BLDV-SCNC: 36 MMOL/L (ref 21–28)
HCT VFR BLD AUTO: 33.3 % (ref 35–47)
HGB BLD-MCNC: 9.8 G/DL (ref 11.7–15.7)
IMM GRANULOCYTES # BLD: ABNORMAL 10*3/UL
IMM GRANULOCYTES NFR BLD: ABNORMAL %
LYMPHOCYTES # BLD AUTO: ABNORMAL 10*3/UL
LYMPHOCYTES # BLD MANUAL: 1 10E3/UL (ref 0.8–5.3)
LYMPHOCYTES NFR BLD AUTO: ABNORMAL %
LYMPHOCYTES NFR BLD MANUAL: 12 %
MAGNESIUM SERPL-MCNC: 2 MG/DL (ref 1.7–2.3)
MCH RBC QN AUTO: 36.4 PG (ref 26.5–33)
MCHC RBC AUTO-ENTMCNC: 29.4 G/DL (ref 31.5–36.5)
MCV RBC AUTO: 124 FL (ref 78–100)
METAMYELOCYTES # BLD MANUAL: 0.2 10E3/UL
METAMYELOCYTES NFR BLD MANUAL: 2 %
MONOCYTES # BLD AUTO: ABNORMAL 10*3/UL
MONOCYTES # BLD MANUAL: 0.3 10E3/UL (ref 0–1.3)
MONOCYTES NFR BLD AUTO: ABNORMAL %
MONOCYTES NFR BLD MANUAL: 4 %
NEUTROPHILS # BLD AUTO: ABNORMAL 10*3/UL
NEUTROPHILS # BLD MANUAL: 6.6 10E3/UL (ref 1.6–8.3)
NEUTROPHILS NFR BLD AUTO: ABNORMAL %
NEUTROPHILS NFR BLD MANUAL: 78 %
NRBC # BLD AUTO: 0 10E3/UL
NRBC BLD AUTO-RTO: 0 /100
O2/TOTAL GAS SETTING VFR VENT: 30 %
O2/TOTAL GAS SETTING VFR VENT: 30 %
O2/TOTAL GAS SETTING VFR VENT: 33 %
O2/TOTAL GAS SETTING VFR VENT: 35 %
O2/TOTAL GAS SETTING VFR VENT: 35 %
O2/TOTAL GAS SETTING VFR VENT: 36 %
O2/TOTAL GAS SETTING VFR VENT: 36 %
OXYHGB MFR BLDV: 82 % (ref 70–75)
OXYHGB MFR BLDV: 83 % (ref 70–75)
OXYHGB MFR BLDV: 85 % (ref 70–75)
OXYHGB MFR BLDV: 86 % (ref 70–75)
OXYHGB MFR BLDV: 87 % (ref 70–75)
OXYHGB MFR BLDV: 89 % (ref 70–75)
PCO2 BLD: 77 MM HG (ref 35–45)
PCO2 BLDV: 78 MM HG (ref 40–50)
PCO2 BLDV: 78 MM HG (ref 40–50)
PCO2 BLDV: 84 MM HG (ref 40–50)
PCO2 BLDV: 89 MM HG (ref 40–50)
PCO2 BLDV: 90 MM HG (ref 40–50)
PCO2 BLDV: 98 MM HG (ref 40–50)
PH BLD: 7.25 [PH] (ref 7.35–7.45)
PH BLDV: 7.11 [PH] (ref 7.32–7.43)
PH BLDV: 7.13 [PH] (ref 7.32–7.43)
PH BLDV: 7.15 [PH] (ref 7.32–7.43)
PH BLDV: 7.24 [PH] (ref 7.32–7.43)
PH BLDV: 7.25 [PH] (ref 7.32–7.43)
PH BLDV: 7.27 [PH] (ref 7.32–7.43)
PHOSPHATE SERPL-MCNC: 5.9 MG/DL (ref 2.5–4.5)
PLAT MORPH BLD: ABNORMAL
PLATELET # BLD AUTO: 170 10E3/UL (ref 150–450)
PO2 BLD: 109 MM HG (ref 80–105)
PO2 BLDV: 51 MM HG (ref 25–47)
PO2 BLDV: 58 MM HG (ref 25–47)
PO2 BLDV: 59 MM HG (ref 25–47)
PO2 BLDV: 59 MM HG (ref 25–47)
PO2 BLDV: 60 MM HG (ref 25–47)
PO2 BLDV: 73 MM HG (ref 25–47)
POLYCHROMASIA BLD QL SMEAR: SLIGHT
POTASSIUM SERPL-SCNC: 4.8 MMOL/L (ref 3.4–5.3)
PROT SERPL-MCNC: 5.9 G/DL (ref 6.4–8.3)
PYRIDOXAL PHOS SERPL-SCNC: 63.3 NMOL/L
RBC # BLD AUTO: 2.69 10E6/UL (ref 3.8–5.2)
RBC MORPH BLD: ABNORMAL
SAO2 % BLDA: 96 % (ref 92–100)
SAO2 % BLDV: 84.1 % (ref 70–75)
SAO2 % BLDV: 85.3 % (ref 70–75)
SAO2 % BLDV: 87.2 % (ref 70–75)
SAO2 % BLDV: 88.5 % (ref 70–75)
SAO2 % BLDV: 89.3 % (ref 70–75)
SAO2 % BLDV: 91.6 % (ref 70–75)
SODIUM SERPL-SCNC: 138 MMOL/L (ref 135–145)
WBC # BLD AUTO: 8.4 10E3/UL (ref 4–11)

## 2024-03-19 PROCEDURE — 250N000013 HC RX MED GY IP 250 OP 250 PS 637

## 2024-03-19 PROCEDURE — 80053 COMPREHEN METABOLIC PANEL: CPT

## 2024-03-19 PROCEDURE — 99418 PROLNG IP/OBS E/M EA 15 MIN: CPT | Performed by: INTERNAL MEDICINE

## 2024-03-19 PROCEDURE — 94660 CPAP INITIATION&MGMT: CPT

## 2024-03-19 PROCEDURE — 94799 UNLISTED PULMONARY SVC/PX: CPT

## 2024-03-19 PROCEDURE — 999N000157 HC STATISTIC RCP TIME EA 10 MIN

## 2024-03-19 PROCEDURE — 250N000011 HC RX IP 250 OP 636

## 2024-03-19 PROCEDURE — 85027 COMPLETE CBC AUTOMATED: CPT

## 2024-03-19 PROCEDURE — 250N000011 HC RX IP 250 OP 636: Performed by: INTERNAL MEDICINE

## 2024-03-19 PROCEDURE — 250N000011 HC RX IP 250 OP 636: Performed by: STUDENT IN AN ORGANIZED HEALTH CARE EDUCATION/TRAINING PROGRAM

## 2024-03-19 PROCEDURE — 99233 SBSQ HOSP IP/OBS HIGH 50: CPT | Performed by: NURSE PRACTITIONER

## 2024-03-19 PROCEDURE — 82805 BLOOD GASES W/O2 SATURATION: CPT

## 2024-03-19 PROCEDURE — 90937 HEMODIALYSIS REPEATED EVAL: CPT

## 2024-03-19 PROCEDURE — 94640 AIRWAY INHALATION TREATMENT: CPT

## 2024-03-19 PROCEDURE — 200N000002 HC R&B ICU UMMC

## 2024-03-19 PROCEDURE — 99233 SBSQ HOSP IP/OBS HIGH 50: CPT | Mod: FS | Performed by: PHYSICIAN ASSISTANT

## 2024-03-19 PROCEDURE — 93010 ELECTROCARDIOGRAM REPORT: CPT | Performed by: INTERNAL MEDICINE

## 2024-03-19 PROCEDURE — P9045 ALBUMIN (HUMAN), 5%, 250 ML: HCPCS | Mod: JZ | Performed by: INTERNAL MEDICINE

## 2024-03-19 PROCEDURE — 250N000009 HC RX 250

## 2024-03-19 PROCEDURE — 250N000012 HC RX MED GY IP 250 OP 636 PS 637

## 2024-03-19 PROCEDURE — 94640 AIRWAY INHALATION TREATMENT: CPT | Mod: 76

## 2024-03-19 PROCEDURE — 250N000013 HC RX MED GY IP 250 OP 250 PS 637: Performed by: STUDENT IN AN ORGANIZED HEALTH CARE EDUCATION/TRAINING PROGRAM

## 2024-03-19 PROCEDURE — 85007 BL SMEAR W/DIFF WBC COUNT: CPT

## 2024-03-19 PROCEDURE — 634N000001 HC RX 634: Mod: JZ | Performed by: INTERNAL MEDICINE

## 2024-03-19 PROCEDURE — P9047 ALBUMIN (HUMAN), 25%, 50ML: HCPCS | Performed by: INTERNAL MEDICINE

## 2024-03-19 PROCEDURE — 99233 SBSQ HOSP IP/OBS HIGH 50: CPT | Mod: 24 | Performed by: STUDENT IN AN ORGANIZED HEALTH CARE EDUCATION/TRAINING PROGRAM

## 2024-03-19 PROCEDURE — 84100 ASSAY OF PHOSPHORUS: CPT | Performed by: STUDENT IN AN ORGANIZED HEALTH CARE EDUCATION/TRAINING PROGRAM

## 2024-03-19 PROCEDURE — 93005 ELECTROCARDIOGRAM TRACING: CPT

## 2024-03-19 PROCEDURE — 83735 ASSAY OF MAGNESIUM: CPT | Performed by: STUDENT IN AN ORGANIZED HEALTH CARE EDUCATION/TRAINING PROGRAM

## 2024-03-19 PROCEDURE — 99233 SBSQ HOSP IP/OBS HIGH 50: CPT | Mod: FS | Performed by: INTERNAL MEDICINE

## 2024-03-19 PROCEDURE — 258N000003 HC RX IP 258 OP 636

## 2024-03-19 PROCEDURE — 82805 BLOOD GASES W/O2 SATURATION: CPT | Performed by: NURSE PRACTITIONER

## 2024-03-19 PROCEDURE — 258N000003 HC RX IP 258 OP 636: Performed by: INTERNAL MEDICINE

## 2024-03-19 PROCEDURE — 250N000012 HC RX MED GY IP 250 OP 636 PS 637: Performed by: INTERNAL MEDICINE

## 2024-03-19 RX ORDER — ACETYLCYSTEINE 100 MG/ML
4 SOLUTION ORAL; RESPIRATORY (INHALATION)
Status: DISCONTINUED | OUTPATIENT
Start: 2024-03-19 | End: 2024-03-21

## 2024-03-19 RX ORDER — SODIUM BICARBONATE 650 MG/1
1300 TABLET ORAL 3 TIMES DAILY
Status: DISCONTINUED | OUTPATIENT
Start: 2024-03-19 | End: 2024-04-01

## 2024-03-19 RX ORDER — LEVALBUTEROL INHALATION SOLUTION 1.25 MG/3ML
1.25 SOLUTION RESPIRATORY (INHALATION) 4 TIMES DAILY
Status: DISCONTINUED | OUTPATIENT
Start: 2024-03-19 | End: 2024-04-04

## 2024-03-19 RX ORDER — HYDROXYZINE HYDROCHLORIDE 25 MG/1
25 TABLET, FILM COATED ORAL 3 TIMES DAILY PRN
Status: DISCONTINUED | OUTPATIENT
Start: 2024-03-19 | End: 2024-04-02

## 2024-03-19 RX ORDER — ALBUMIN (HUMAN) 12.5 G/50ML
50 SOLUTION INTRAVENOUS
Status: DISCONTINUED | OUTPATIENT
Start: 2024-03-19 | End: 2024-03-19

## 2024-03-19 RX ADMIN — LEVOTHYROXINE SODIUM 25 MCG: 0.03 TABLET ORAL at 06:12

## 2024-03-19 RX ADMIN — CYANOCOBALAMIN TAB 500 MCG 500 MCG: 500 TAB at 08:15

## 2024-03-19 RX ADMIN — PREDNISONE 5 MG: 5 TABLET ORAL at 08:16

## 2024-03-19 RX ADMIN — LEVALBUTEROL HYDROCHLORIDE 1.25 MG: 1.25 SOLUTION RESPIRATORY (INHALATION) at 17:10

## 2024-03-19 RX ADMIN — Medication 40 MG: at 08:17

## 2024-03-19 RX ADMIN — INSULIN ASPART 1 UNITS: 100 INJECTION, SOLUTION INTRAVENOUS; SUBCUTANEOUS at 13:24

## 2024-03-19 RX ADMIN — CHLORHEXIDINE GLUCONATE 15 ML: 1.2 RINSE ORAL at 08:15

## 2024-03-19 RX ADMIN — SODIUM CHLORIDE, POTASSIUM CHLORIDE, SODIUM LACTATE AND CALCIUM CHLORIDE 1000 ML: 600; 310; 30; 20 INJECTION, SOLUTION INTRAVENOUS at 21:14

## 2024-03-19 RX ADMIN — Medication 10 ML: at 13:37

## 2024-03-19 RX ADMIN — PREDNISONE 2.5 MG: 2.5 TABLET ORAL at 20:34

## 2024-03-19 RX ADMIN — LEVALBUTEROL HYDROCHLORIDE 1.25 MG: 1.25 SOLUTION RESPIRATORY (INHALATION) at 09:13

## 2024-03-19 RX ADMIN — Medication 2.5 MCG: at 20:36

## 2024-03-19 RX ADMIN — ACETYLCYSTEINE 4 ML: 100 INHALANT RESPIRATORY (INHALATION) at 12:17

## 2024-03-19 RX ADMIN — LEVALBUTEROL HYDROCHLORIDE 1.25 MG: 1.25 SOLUTION RESPIRATORY (INHALATION) at 20:04

## 2024-03-19 RX ADMIN — ALBUMIN HUMAN 250 ML: 0.05 INJECTION, SOLUTION INTRAVENOUS at 11:16

## 2024-03-19 RX ADMIN — CYCLOSPORINE 200 MG: 100 SOLUTION ORAL at 08:32

## 2024-03-19 RX ADMIN — ACETYLCYSTEINE 4 ML: 100 INHALANT RESPIRATORY (INHALATION) at 17:10

## 2024-03-19 RX ADMIN — CALCIUM CARBONATE 600 MG (1,500 MG)-VITAMIN D3 400 UNIT TABLET 1 TABLET: at 18:11

## 2024-03-19 RX ADMIN — SODIUM BICARBONATE 650 MG TABLET 1300 MG: at 13:24

## 2024-03-19 RX ADMIN — FIDAXOMICIN 200 MG: 200 TABLET, FILM COATED ORAL at 20:34

## 2024-03-19 RX ADMIN — ACETYLCYSTEINE 4 ML: 100 INHALANT RESPIRATORY (INHALATION) at 20:04

## 2024-03-19 RX ADMIN — LIDOCAINE 4% 1 PATCH: 40 PATCH TOPICAL at 20:36

## 2024-03-19 RX ADMIN — CALCIUM CARBONATE 600 MG (1,500 MG)-VITAMIN D3 400 UNIT TABLET 1 TABLET: at 13:24

## 2024-03-19 RX ADMIN — SODIUM BICARBONATE 650 MG TABLET 1300 MG: at 20:35

## 2024-03-19 RX ADMIN — HYDROXYZINE HYDROCHLORIDE 25 MG: 25 TABLET, FILM COATED ORAL at 17:01

## 2024-03-19 RX ADMIN — LEVALBUTEROL HYDROCHLORIDE 1.25 MG: 1.25 SOLUTION RESPIRATORY (INHALATION) at 12:17

## 2024-03-19 RX ADMIN — HEPARIN SODIUM 5000 UNITS: 5000 INJECTION, SOLUTION INTRAVENOUS; SUBCUTANEOUS at 18:11

## 2024-03-19 RX ADMIN — Medication 2.5 MCG: at 08:17

## 2024-03-19 RX ADMIN — FIDAXOMICIN 200 MG: 200 TABLET, FILM COATED ORAL at 08:32

## 2024-03-19 RX ADMIN — SODIUM CHLORIDE 300 ML: 9 INJECTION, SOLUTION INTRAVENOUS at 11:16

## 2024-03-19 RX ADMIN — OLANZAPINE 5 MG: 5 TABLET, ORALLY DISINTEGRATING ORAL at 21:39

## 2024-03-19 RX ADMIN — ALBUMIN HUMAN 50 ML: 0.25 SOLUTION INTRAVENOUS at 12:02

## 2024-03-19 RX ADMIN — CYCLOSPORINE 200 MG: 100 SOLUTION ORAL at 18:13

## 2024-03-19 RX ADMIN — EPOETIN ALFA-EPBX 8000 UNITS: 10000 INJECTION, SOLUTION INTRAVENOUS; SUBCUTANEOUS at 11:17

## 2024-03-19 RX ADMIN — LORAZEPAM 0.25 MG: 2 INJECTION INTRAMUSCULAR; INTRAVENOUS at 13:37

## 2024-03-19 RX ADMIN — CALCIUM CARBONATE 600 MG (1,500 MG)-VITAMIN D3 400 UNIT TABLET 1 TABLET: at 08:15

## 2024-03-19 RX ADMIN — HEPARIN SODIUM 5000 UNITS: 5000 INJECTION, SOLUTION INTRAVENOUS; SUBCUTANEOUS at 06:12

## 2024-03-19 RX ADMIN — INSULIN ASPART 1 UNITS: 100 INJECTION, SOLUTION INTRAVENOUS; SUBCUTANEOUS at 08:31

## 2024-03-19 RX ADMIN — IRON SUCROSE 50 MG: 20 INJECTION, SOLUTION INTRAVENOUS at 11:22

## 2024-03-19 RX ADMIN — Medication 40 MG: at 20:36

## 2024-03-19 ASSESSMENT — ACTIVITIES OF DAILY LIVING (ADL)
ADLS_ACUITY_SCORE: 34
ADLS_ACUITY_SCORE: 29
ADLS_ACUITY_SCORE: 34
ADLS_ACUITY_SCORE: 29
ADLS_ACUITY_SCORE: 34
ADLS_ACUITY_SCORE: 29
ADLS_ACUITY_SCORE: 34
ADLS_ACUITY_SCORE: 34
ADLS_ACUITY_SCORE: 35
ADLS_ACUITY_SCORE: 29
ADLS_ACUITY_SCORE: 29
ADLS_ACUITY_SCORE: 35
ADLS_ACUITY_SCORE: 29
ADLS_ACUITY_SCORE: 35
ADLS_ACUITY_SCORE: 35
ADLS_ACUITY_SCORE: 34
ADLS_ACUITY_SCORE: 35
ADLS_ACUITY_SCORE: 35
ADLS_ACUITY_SCORE: 29

## 2024-03-19 NOTE — PLAN OF CARE
Problem: Fall Injury Risk  Goal: Absence of Fall and Fall-Related Injury  Outcome: Progressing     Problem: Adult Inpatient Plan of Care  Goal: Plan of Care Review  Description: The Plan of Care Review/Shift note should be completed every shift.  The Outcome Evaluation is a brief statement about your assessment that the patient is improving, declining, or no change.  This information will be displayed automatically on your shift  note.  Outcome: Progressing     Problem: Fall Injury Risk  Goal: Absence of Fall and Fall-Related Injury  Outcome: Progressing   Goal Outcome Evaluation:       D. Transfer to ICU via bed - accompanied by  Nurse- on bi pap- awake alert - follows commands- denies pain- shortness of breath. ,  at bedside. .  Vss-   A transfer from [facility] on full face bipap-   I cont to monitor - s/s co2 increase - Decrease loc.  Stable at this time.

## 2024-03-19 NOTE — PROGRESS NOTES
CLINICAL NUTRITION SERVICES - BRIEF NOTE    See RD note 3/14 for full assessment.     Nutrition Prescription    RECOMMENDATIONS FOR MDs/PROVIDERS TO ORDER:  Resume oral diet as medically appropriate. Discussed in rounds, if unable to resume PO will increase TF to meet full nutrition needs.     Recommendations already ordered by Registered Dietitian (RD):  Continue current TF regimen with plan to resume PO    Future/Additional Recommendations:  Monitor ability to resume oral diet. If unable to resume oral diet in next 24-48 hours, increase TF.   Goal TF: Eneida Stoll Renal Support 1.8 @ goal 40 ml/hr x 22 (880 ml/day) will provide: 1584  kcals (kcal/kg), 70 g PRO (gm/kg), 590 ml free H20, 150 g CHO, and 17 g fiber daily.   Continue to hold for 1 hour before and after synthroid.        New findings:   Pt transferred back to ICU overnight due to worsening respiratory acidosis. Pt now on Bipap and NPO. PO was being assessed prior to ICU. Now that pt is NPO, will increase TF slightly to better meet nutrition needs.     Diet: NPO + TF  (was on renal dialysis diet with TF)    Nutrition Support: access: PEGJ    Formula/schedule/modulars: 3/14-___: KF Renal Support @ 35 mL/hr x 22 hours/day (held for 1 hour before/after Synthroid) = 770 mL formula, 1386 Kcals, 62 gm pro, 131 gm CHO, 15 gm fiber, 516 mL free water daily   --> Meets 100% low end Kcal and protein goals + takes PO     Free water flushes: 30 mL every 4 hours    GI:  Last BM: 03/17/24  Typically stooling 2+ times daily per I/O recently     Weight:  Most Recent Weight: 48.4 kg (106 lb 11.2 oz)  on 3/19/24   Last Standing scale weight: 46.2 kg (101 lb 13.6 oz) on 3/17/24  Body mass index is 19.64 kg/m .    Meds:  Calcium carbonate vitamin D TID; vitamin B12 500 mcg daily; iron sucrose injection 50 mg once today  SSI Q4H  Synthroid  Prednisone  Renvela (held)  Bactrim     Labs:  Vitamin B12 2191 (H) 3/15/24; 1930 (H) 2/14/24 03/18/24 06:51 03/18/24 14:00 03/18/24  "17:21 03/19/24 02:18   Sodium 138 140 139 138   Potassium 5.4 (H) 5.3 4.3 4.8   Chloride 101 100 99 99   Carbon Dioxide (CO2) 21 (L) 25 27 25   Urea Nitrogen 88.2 (H) 97.5 (H) 58.2 (H) 67.0 (H)   Creatinine 4.26 (H) 4.60 (H) 3.12 (H) 3.60 (H)   GFR Estimate 11 (L) 10 (L) 16 (L) 14 (L)   Calcium 9.3 8.7 (L) 8.8 8.9   Anion Gap 16 (H) 15 13 14   Magnesium 2.4 (H)  2.0 2.0   Phosphorus 6.7 (H)  4.8 (H) 5.9 (H)   Albumin 3.6   3.7   Protein Total 6.1 (L)   5.9 (L)   Alkaline Phosphatase 158 (H)   131   ALT <5   12   AST 19   24   Bilirubin Total 0.3   0.4   CK Total  22 (L)     CRP Inflammation   25.30 (H)    Glucose 108 (H) 181 (H) 153 (H) 114 (H)   Procalcitonin   0.92 (H)      Respiratory:   BIPAP    Renal:  iHD    Implementation  Collaboration with other providers  Enteral Nutrition - Modify rate and volume       RD to follow per protocol    Christie Deras, RDN, LD  4C MICU RD  Vocera - \"4C Clinical Dietitian\"  Weekend/Holiday RD - \"Weekend Clinical Dietitian\"  "

## 2024-03-19 NOTE — PROGRESS NOTES
MEDICAL ICU PROGRESS NOTE  03/19/2024      Date of Service (when I saw the patient): 03/19/2024    ASSESSMENT: oSfie Rodriguez is a 63 year old female with PMH COPD s/p bilateral lung transplant 6/28/22 c/b hemidiaphragm palsy and recurrent pneumonias, gastroparesis and small bowel dysmotility complicated by severe malnutrition now s/p PEG/J, ESRD on T/Th/Sat HD, recent R femoral fx s/p ORIF, chronic diarrhea, recurrent c-diff, failure to thrive with inability to tolerate any tube feeds, who was admitted to [facility] on 2/10/24 for concerns over malnutrition and TPN initiation via portacath. Course complicated by recurrent and progressive acute on chronic hypoxic and hypercarbic respiratory failure requiring ICU admission and intubation 2/18-2/19 and a second ICU admission 2/28-2/29 for continuous BiPAP. Again with worsening respiratory acidosis and hypercarbia requiring transfer back to ICU 3/18/2024.      CHANGES and MAJOR THINGS TODAY:   BiPAP when sleeping, okay for breaks on HFNC  Simultaneous ABG and VBG  Start 1300 mg sodium bicarb TID  Request high bicarb bath for HD  Add Xopenex and mucomyst nebs QID  Increase hydroxyzine to 25 mg TID  Health psychology consult for help tolerating BiPAP      PLAN:    Neuro:  # Pain  No complaints of pain this morning. Patient has multiple bruises over her arms and face which is attributed to previous falls.   - Tylenol availble Q4h prn  - Lidocaine patch prn   - Heating pads prn     # Claustrophobia  Reports claustrophobia contributing to limited compliance with BiPAP  - Ativan 0.25 mg PRN for anxiety, none used in last 24 hours  - Hydroxyzine increased to 25 mg TID prn  - Discussed with patient, she is amenable to health psych to explore coping mechanisms to tolerate bipap     # Bilateral neuropathic pain   New pain b/l over the past few days. Last A1c <4.2 (7/11/23). Consider possibly 2/2 ESRD. TSH wnl. B12 elevated. B6, copper, B1, B3 pending, zinc 48.3 (mildly  low)   - Consider gabapentin in the coming days   - Neuro Recs:  - No obvious clinical history or exam findings to suggest neurologic/neuromuscular causes of respiratory weakness  - Neuropathy labs currently pending     Pulmonary:  # Acute on chronic hypoxic and hypercarbic respiratory failure  # Acute on chronic respiratory acidosis  # R hemidiaphragm palsy  # Recurrent PNAs   # Positive DSA   # Suspected CARLEE  # PTA nocturnal O2, 2L   S/p bilateral lung transplant 6/28/22 for COPD. Brief inpatient pulmonary history:  2/10 admission to address malnutrition  2/18 intubated for hypoxic respiratory failure; differential edema vs infection; bronch with only C. glabrata  2/19 extubated and ongoing hypercarbia with intermittent BiPAP compliance 2/2 claustrophobia  2/27 neurology consult query neuromuscular etiology for hypercarbia --> MRI negative and exam reassuring against neuromuscular etiology  2/28 ICU admission, continuous BiPAP, trial theophylline (stopped concerned benefits may not outweigh risks and difficulty getting therapeutic levels)  3/5 FVC//-40  3/8 SNIFF test without paradoxical movement of bilateral diaphragm  3/12 metabolic CART suggested overfeeding  3/15 FVC//-25  3/16 FVC//-33    Over the course of admission have considered multiple causes of respiratory failure including infection, volume overload, overfeeding, neuromuscular weakness. Currently, concerned kidneys are unable to compensate for consistently elevated pCO2 resulting in acidosis. Historically, she was given oral bicarb 2/26-2/28, but it was stopped concerned it may ultimately be worsening her hypercarbia. Chest CT 3/18 with bibasilar consolidation and GGOs, concern for mucous plugging in RLL.  - Continue BiPAP 16/5 with breaks on HFNC  - Recheck VBG and ABG simultaneously to assess for consistency  - Try oral bicarb again, ask neph to increase bicarb bath (using 36 instead of the usual 32)  - Health psychology consult  for coping mechanisms to tolerate BiPAP  - Continue secretion clearance: Xopenex + Mucomyst, add acapella and CPT  - HD again today for volume removal  - Consider adding modafinil    # S/p BSLT 6/8/22 for COPD  - Pulm transplant following  - Immunosuppression: prednisone 5 qam and 2.5 qpm, cyclosporine 200 mg BID (started 3/11, tacro stopped)  - Infecious ppx: Bactrim MWF  - Prospera 3/18 pending     Cardiovascular:  # A-flutter (recurrent on 3/17)  # A-fib, intermittent  # HTN  # HFpEF  PTA metoprolol (25mg BID) has been held through much of admission. On 3/17 new atrial fflutter developed overnight with RVR to 150s required amiodarone. Amio gtt stopped 3/18. TTE 2/17 with LVEF 55-60%, global RV function normal, no significant valvular abnormalities  - MAP goal > 65 mmHg  - HOLD PTA Metoprolol 25 mg BID   - Continuous telemetry      GI/Nutrition:  # Severe malnutrition   # Failure to thrive  # Hypoalbuminemia  # Gastroparesis, small bowel dysmotility  # S/P PEG/J with intolerance of enteral nutrition  # Chronic osmotic diarrhea/SIBO s/p Rifaximin  # Recurrent C.Diff (3rd ep)    # GERD  - Nutrition consulted, appreciate assistance  - TPN discontinued 3/16  - NPO, okay to try liquids later today if doing okay off BiPAP  - TF at goal rate 35 mL/hour  - GI consulted/signed off; appreciate recs              -PO Fidaxomicin 200 mg BID for 10 days- diarrhea has improved since start of tx               -May reconsult GI after tx of C.diff for future colonoscopy +/- biopsy    - May benefit from small bowel motility study if not improving outpatient through [facility]  - PPI BID  - Bowel regimen PRN     Renal/Fluids/Electrolytes:  # ESRD on HD  # Hypervolemic hyponatremia, resolved  # Anion gap metabolic acidosis, resolved  # Mild hyperphosphatemia  Patient is ESRD on T/Th/Sat HD as outpt.  - HD per typical schedule today, increase HCO3 bath  - CMP, Mg, Phos daily  - Strict I/Os  - Daily weight  - Renally adjust medications      Endocrine:  # Hyperglycemia, stress induced  - Low dose sliding scale insulin  - Hypoglycemia protocol     # Hypothyroidism  Patient with new low T4 at 0.64 and TSH at 6.5, concerning for new hypothyroidism vs sick thyroid syndrome. TSH with appropriate response, more consistent with elevation in the setting of acute illness. ICU team on  recommended initiation of treatment for possible hypothyroid as this can improve respiratory muscle function in the setting of weakness and malnutrition. 3/7 repeat thyroid function WNL.  - Continue liothyronine and levothyroxine per ICU team recommendations     ID:  # Recurrent C. diff colitis  # Recurrent pneumonia  # Hx EBV viremia   # Hypogammaglobulinemia  # Chronic immunosuppression / lung transplant  Empirically treated for pneumonia  - , RVP and cultures no growth to date. Recurrent C.Diff Positive 3/12.   - Afebrile, WBC slight increase, follow curve  -  Ig  -  EBV 27K (decreased compared to prior)    - In setting of recurrent hypercapnic respiratory failure, consider panculture, bronch if requires intubation    Antibiotics:  Fidaxomicin (3/13-*3/22)  Micafungin (- )  Zosyn ( - ) for HAP  Vancomycin ( - )  Bactrim MWF, PJP ppx (previously on Dapsone)        Hematology:    # Acute on chronic anemia 2/2 ESRD  Hgb stable, with no acute signs of bleeding.   - Daily CBC  - Transfuse for Hgb < 7  - Continue epogen per nephrology with dialysis  - Venofer 50 mcg qweek with dialysis     # Steal physiology of LUE dialysis access fistula  LUE arterial US obtained  with concern for LUE edema. US of fistula demonstrated steal physiology. Discussed with nephrology, and vascular surgery consulted on . Vascular surgery has only minor concerns for steal symptoms and given that the AVF is working well, they are okay with outpatient fistulograms/ venoplasty with wrist brachial index and PPG's, unless new concerns arise.  - plan  for outpatient workup as above; nephrology in agreement with outpatient workup.     Musculoskeletal:  # Right hip fracture s/p ORIF (December 2023)   - PT/OT     Skin:  # Left upper extremity unilateral edema, improving   # Bilateral pedal and ankle edema, improving  Upper limb duplex USG on 2/18 negative for DVT.  USG left arm on 2/21 shows steel physiology and Vascular Surgery consulted (see above).   - Elevation and wrapping of only lower legs as needed and increased UF per nephrology for volume management; no wrapping of left upper extremity with dialysis fistula  - Lymphedema consulted but signed off, but didn't tolerate wraps    General Cares/Prophylaxis:    DVT Prophylaxis: Heparin SQ  GI Prophylaxis: PPI  Restraints: N/A  Family Communication: DaughterCharity, updated by phone at 15:35  Code Status: FULL     Lines/tubes/drains:  - PIV  - PEG/J  - Left arm AV fistula  - PICC line     Disposition:  - Medical ICU      Patient seen and findings/plan discussed with medical ICU staff, Dr. Liao.  Critical Care time: 45 min    RUFUS Fierro CNP      Attending note:  Patient seen, examined and discussed with the EMILY. All data reviewed including vital signs, medications, laboratory studies, and imaging.  Agree with the assessment and plan as outlined in the above note.  The patient remains critically ill with acute on chronic respiratory failure, s/p lung transplant, COPD, ESRD, chronic CO retention.  I personally adjusted the BiPAP to treat the acute on chronic respiratory failure, followed hemodynamics in the setting of ESRD.  Transferred back to MICU secondary to increasing PCO2 and decreased pH, has been chronic issus without clear etiology; appears baseline PCO2 is 70-80.  Has required short-term intubation recently for similar problem.  Lack of bicarbonate generation by kidney is adding to chronic problem.  Has been assessed for neurologic disease in the past without conclusive findings.   Will continue  BiPAP, start oral HCO3 supplementation, and discuss with renal regarding increasing HCO3 support during dialysis.  Consider CNS stimulant of Modofenil or theophylline to assist with PCO2 clearance.  Discussed treatment options to assist with tolerating BiPAP, consider Health Psychology consult for biofeedback or other treatment plan.     Total Critical care  time excluding time for teaching and procedures was 40 minutes     Ana Liao MD  [phone]    Clinically Significant Risk Factors        # Hyperkalemia: Highest K = 5.4 mmol/L in last 2 days, will monitor as appropriate       # Hypoalbuminemia: Lowest albumin = 2.6 g/dL at 2/18/2024  5:13 AM, will monitor as appropriate             # Severe Malnutrition: based on nutrition assessment    # Financial/Environmental Concerns: none            ====================================  INTERVAL HISTORY:   No acute events overnight. This morning denies pain, shortness of breath, nausea, abdominal pain, numbness/tingling.     OBJECTIVE:   1. VITAL SIGNS:   Temp:  [97.3  F (36.3  C)-99.1  F (37.3  C)] 99.1  F (37.3  C)  Pulse:  [] 103  Resp:  [12-30] 18  BP: ()/() 94/48  Cuff Mean (mmHg):  [] 113  FiO2 (%):  [30 %-40 %] 35 %  SpO2:  [91 %-100 %] 96 %  FiO2 (%): 35 %  Resp: 18    2. INTAKE/ OUTPUT:   I/O last 3 completed shifts:  In: 1345 [I.V.:290; NG/GT:460]  Out: 600 [Other:600]    3. PHYSICAL EXAMINATION:  General: Awake, alert  HEENT: Mucous membranes moist  Neuro: Awake, alert, answering questions appropriately, moving all extremities to command  Pulm/Resp: Diminished breath sounds throughout  CV: RRR, S1/S2 without m/r/g; pedal pulses 2+ with trace ankle edema  Abdomen: Soft, non-distended, non-tender  : No urine made  Incisions/Skin: PICC and PEG tube without surrounding erythema    4. LABS:   Arterial Blood Gases   No lab results found in last 7 days.  Complete Blood Count   Recent Labs   Lab 03/19/24  0021 03/18/24  1721 03/18/24  0651  03/16/24  0651   WBC 8.4 7.5 10.1 7.7   HGB 9.8* 9.5* 10.7* 10.0*    175 213 203     Basic Metabolic Panel  Recent Labs   Lab 03/19/24  0342 03/19/24  0218 03/18/24  2352 03/18/24  1950 03/18/24  1721 03/18/24  1715 03/18/24  1400 03/18/24  0651   NA  --  138  --   --  139  --  140 138   POTASSIUM  --  4.8  --   --  4.3  --  5.3 5.4*   CHLORIDE  --  99  --   --  99  --  100 101   CO2  --  25  --   --  27  --  25 21*   BUN  --  67.0*  --   --  58.2*  --  97.5* 88.2*   CR  --  3.60*  --   --  3.12*  --  4.60* 4.26*   * 114* 93 116* 153*   < > 181* 108*    < > = values in this interval not displayed.     Liver Function Tests  Recent Labs   Lab 03/19/24  0218 03/18/24  0651 03/17/24  0328 03/16/24  0651   AST 24 19 16 17   ALT 12 <5 12 <5   ALKPHOS 131 158* 147 121   BILITOTAL 0.4 0.3 0.2 0.3   ALBUMIN 3.7 3.6 3.5 3.4*   INR  --  1.10  --   --      Coagulation Profile  Recent Labs   Lab 03/18/24  0651   INR 1.10       5. RADIOLOGY:   Recent Results (from the past 24 hour(s))   XR Chest Port 1 View    Narrative    Exam: XR CHEST PORT 1 VIEW  3/18/2024 10:07 AM     History:  s/p lung transplant, interval monitoring, hypercapnia       Comparison:  3/5/2024    Findings: Single view of the chest. Postsurgical changes of the chest  with intact clam shell sternotomy wires. Right upper extremity PICC  tip projects over the low SVC. Stable cardiomediastinal silhouette.  Similar small pleural effusions, left greater than right. Pulmonary  artery convexity is somewhat enlarged as is left atrial appendage  convexity of the left mediastinal border. No pneumothorax. Diffuse  interstitial prominence with basilar predominant air space opacities,  increased.      Impression    Impression:   1. Increased diffuse interstitial and basilar predominant airspace  opacities, likely edema/atelectasis versus possible infection.  2. Stable small pleural effusions.  3. Mild pulmonary enlargement as present on recent chest CT.  Also,  left atrial appendage enlargement.    I have personally reviewed the examination and initial interpretation  and I agree with the findings.    ALVINA HARRY MD         SYSTEM ID:  [id number]   CT Chest w/o Contrast    Narrative    EXAMINATION: CT CHEST W/O CONTRAST, 3/18/2024 7:16 PM    CLINICAL HISTORY: acute on chronic hypercapnic respiratory failure of  unclear etiology, s/p BSLT 2022    COMPARISON: Radiograph earlier same day. CT 3/7/2024..    TECHNIQUE: CT imaging obtained through the chest without contrast.  Coronal, sagittal and axial MIP reformatted images obtained and  reviewed. Noncontrast exam.    FINDINGS:    Lines, tubes, devices: Right upper extremity PICC tip terminates at  the superior cavoatrial junction.    Lungs: Changes of bilateral lung transplantation. Mucous plugging in  the airways of the right lower lobe. There is right greater than left  lower lobe and lingular consolidation with surrounding diffuse  groundglass opacities. Smooth interlobular septal thickening. The  central tracheobronchial tree is patent. No areas of bronchiectasis or  architectural distortion. No pleural effusion, pulmonary  consolidation, or pneumothorax. Mild bibasilar atelectasis.     Mediastinum: The visualized thyroid is unremarkable.Heart size is  enlarged. No pericardial effusion. The thoracic aorta and main  pulmonary artery are within normal limits. Standard branching pattern  of the great vessels. No abnormal thoracic lymph nodes by size  criteria although difficult to evaluate on noncontrast imaging with  limited fat  structures. Numerous prominent mediastinal  lymph nodes. Patulous esophagus.    Bones and soft tissues: No suspicious osseous lesion.    Upper abdomen:  Limited evaluation of the upper abdomen. No acute  pathology of the visualized solid organs. Moderate atherosclerotic  calcifications of the abdominal aorta. Several small nonobstructing  left renal stones measuring up to 3  mm.    Diffuse body wall edema.      Impression    IMPRESSION:   Bibasilar consolidation, smooth interlobular septal, and diffuse  groundglass opacities. Given mucous plugging in the airways of the  right lower lobe concerning for aspiration pneumonia which may be  superimposed on pulmonary edema.    I have personally reviewed the examination and initial interpretation  and I agree with the findings.    YANNICK BENAVIDEZ MD         SYSTEM ID:  [id number]

## 2024-03-19 NOTE — PROGRESS NOTES
Palliative Care Consultation Note  [facility]      Patient: Sofie Rodriguez  Date of Admission:  2/10/2024    Requesting Clinician / Team: Medicine  Reason for consult: Goals of care  Decisional support       Recommendations & Counseling     GOALS OF CARE:   Please refer to progress note from 3/15 for details about last care conference  Since the last care conference, Sofie has been transitioned off TPN to tube feeds to address overfeeding causing increased CO2 production. She has been up with SBA. She has only needed HD x3 weekly instead of x4 weekly.   Sofie has been trying to wear the BiPAP more though she is unable to consistently tolerate it. She had to unfortunately be transferred back to the ICU for a 3rd time on 3/18 due to hypercapnia with worsening pH of 7.08 to 7.05.   Spoke at length with pulm transplant team. Concern that Sofie takes one step forward then one step back and has not made any meaningful progress over the past 4-5 weeks. A lot of angst seems to be about inability to tolerate BiPAP/AVAPS which is likely thought to be needed life-long at least at bedtime and possibly intermittently during the day. Asked if there were ways to make the mask more tolerable for Sofie given concerns of anxiety/claustrophobia surrounding it. Further mentioned that we may need to make Sofie a bit sleepier at night time so she doesn't care about the mask but also try not to cause respiratory depression/over sedation.   Met with Sofie at bedside while she was getting HD also at bedside. States that she is a bit tired but was alert and oriented x4. She told me she is not ready to die. She still wants to try to get back home. She said the current mask/settings she was on during my visit around ~1230 was more comfortable than previous ones (unsure if this is the same one she has been using or not).  I suggested another care conference to hash things out again and see what else  can be done to help her medical conditions, especially her respiratory status. She was amenable to this and asked that we include daughter Charity, daughter Juila, and son Nain (assuming this is Gera). Appreciate care management team helping set up FCC for sometime later this week.  Plan of care - continue restorative cares without limits    ADVANCE CARE PLANNING:  No HCD on file, Sofie noted that her daughter, Charity, should be her surrogate decision maker if she were unable to make decisions for herself   There is no POLST form on file,  recommend continued medical treatment and FULL CODE   Code status: Full Code    MEDICAL MANAGEMENT:   We are not actively managing symptoms at this time. EKG from 3/16 with QTc in the 530s. Repeat today not in system yet (?). If QTc is <500 msec, can consider increase Zyprexa to 10 mg at bedtime to help with sleep and anxiety related to BiPAP.    PSYCHOSOCIAL/SPIRITUAL SUPPORT:  Sofie finds fidel mostly in her friends and family. She lives in [place] in a house with one of her daughters and three of her grandchildren. She has two other adult children as well and four other grandchildren as well.  Shinto but not involved with the [employer]  Declined PSS needs    Palliative Care will follow peripherally. We would be happy to attend a care conference when scheduled.    Total time spent was 60 minutes regarding goals of care and support on the date of the encounter. These recommendations were given to the primary team via this note.    Primitivo Pink DO / Internal and Palliative Medicine   Securely message with the Vocera Web Console (learn more here)   Text page via [employer] Paging/Directory       Assessment      Sofie Rodriguez is a 63 year old female with PMH COPD s/p bilateral lung transplant 6/28/22, hemidiaphragm palsy, PEG dependence, ESRD on HD, and SIBO    Initially admitted on 2/10/24 with worsening malnutrition requiring TPN initiation. Her hospital course has been complicated by  worsening CO2 retention, pulmonary edema, and AMS requiring BiPAP and intubation with ICU transfer on 2/17. She was extubated and transferred out of the ICU on 2/19. Tolerated iHD on 2/02. Readmitted to the ICU on 2/28 for monitoring due to hypercarbia and respiratory acidosis but improved with AVAPS and transferred back to the floor 2/29. She has been struggling with needing HD x4 weekly from fluid overload from TPN (unable to tolerate po intake due to GI issues) and declining AVAPS due to discomfort. Palliative signed off 3/7. We were reconsulted 3/11 for goals of care. Readmitted to the MICU again for hypercapnic RF (pH 7.05, pCO2 102) on 3/18.     Today, the patient was seen for:  Status post bilateral lung transplant with hemidiaphragm palsy  PEG dependent  TPN dependent  ESRD on HD (x4 weekly)  ESRD  [facility]   Support  Encounter for palliative care      Palliative Care Summary:   Seen and examined at bedside. No needs noted. Visit/thoughts as above.    Medications:  I have reviewed this patient's medication profile and medications from this hospitalization.        Physical Exam   Vital Signs with Ranges  Temp:  [97  F (36.1  C)-99.1  F (37.3  C)] 97  F (36.1  C)  Pulse:  [] 94  Resp:  [12-74] 32  BP: ()/(46-85) 130/61  FiO2 (%):  [35 %] 35 %  SpO2:  [91 %-100 %] 100 %  106 lbs 11.24 oz  General: Not in acute distress.  Head: Atraumatic. Normocephalic.   Eyes: Anicteric without injection. Eyes conjugate. Extraocular movements intact.  Ear, Nose, and Throat: Mouth pink and moist without lesions. Neck without overt masses.  Pulmonary: Unlabored. Speaking in short sentences due to being on BiPAP  Cardiovascular: Perfusing.   Abdomen: Non-distended. Feeding tube in place.  Skin: Warm. Scattered ecchymoses.  Musculoskeletal: Sarcopenic.  Neuro: Speech fluent. Face symmetric. No focal neurologic deficit noted. Alert and oriented x4.  Psych: Normal mood and affect. Sensorium, gross memory, thought processes,  and fund of knowledge intact.        Data reviewed:  [facility]  Recent Labs   Lab 03/19/24  1323 03/19/24  1310 03/19/24  0826 03/19/24  0342 03/19/24  0218 03/18/24  1950 03/18/24  1721 03/18/24  1715 03/18/24  1400 03/18/24  0651 03/17/24  1835 03/17/24  0328 03/16/24  0800 03/16/24  0651   NA  --   --   --   --  138  --  139  --  140 138  --  141   < > 137   POTASSIUM  --   --   --   --  4.8  --  4.3  --  5.3 5.4*  --  3.9   < > 4.5   CHLORIDE  --   --   --   --  99  --  99  --  100 101  --  103   < > 101   CO2  --   --   --   --  25  --  27  --  25 21*  --  27   < > 24   ANIONGAP  --   --   --   --  14  --  13  --  15 16*  --  11   < > 12   * 165* 142* 123* 114*   < > 153*   < > 181* 108*   < > 74   < > 104*   BUN  --   --   --   --  67.0*  --  58.2*  --  97.5* 88.2*  --  53.4*   < > 79.4*   CR  --   --   --   --  3.60*  --  3.12*  --  4.60* 4.26*  --  2.96*   < > 3.90*   GFRESTIMATED  --   --   --   --  14*  --  16*  --  10* 11*  --  17*   < > 12*   ESTUARDO  --   --   --   --  8.9  --  8.8  --  8.7* 9.3  --  8.8   < > 8.9   MAG  --   --   --   --  2.0  --  2.0  --   --  2.4*  --  2.2   < > 1.9   PHOS  --   --   --   --  5.9*  --  4.8*  --   --  6.7*  --  3.7  --  5.4*   PROTTOTAL  --   --   --   --  5.9*  --   --   --   --  6.1*  --  5.9*  --  5.9*   ALBUMIN  --   --   --   --  3.7  --   --   --   --  3.6  --  3.5  --  3.4*   BILITOTAL  --   --   --   --  0.4  --   --   --   --  0.3  --  0.2  --  0.3   ALKPHOS  --   --   --   --  131  --   --   --   --  158*  --  147  --  121   AST  --   --   --   --  24  --   --   --   --  19  --  16  --  17   ALT  --   --   --   --  12  --   --   --   --  <5  --  12  --  <5    < > = values in this interval not displayed.     CBC  Recent Labs   Lab 03/19/24  0021 03/18/24  1721 03/18/24  0651 03/16/24  0651   WBC 8.4 7.5 10.1 7.7   RBC 2.69* 2.66* 3.00* 2.80*   HGB 9.8* 9.5* 10.7* 10.0*   HCT 33.3* 32.9* 37.8 34.8*   * 124* 126* 124*   MCH 36.4* 35.7* 35.7* 35.7*   MCHC  29.4* 28.9* 28.3* 28.7*   RDW 18.2* 18.4* 18.5* 19.0*    175 213 203     INR  Recent Labs   Lab 03/18/24  0651   INR 1.10     Arterial Blood Gas  Recent Labs   Lab 03/19/24  1306 03/19/24  1159 03/19/24  0830 03/19/24  0343   PH  --  7.25*  --   --    PCO2  --  77*  --   --    PO2  --  109*  --   --    HCO3  --  34*  --   --    O2PER 30 30 36 35

## 2024-03-19 NOTE — PROGRESS NOTES
Nephrology Progress Note  03/19/2024         Assessment & Recommendations:   Sofie Rodriguez is a 63 year old year old female with ESKD, COPD s/p b/l lung transplant in 6/2022 with multiple complications, gastroparesis s/p GJ tube, GIB, chronic diarrhea, recurrent c-diff, FTT with inability to tolerate any tube feeds, R hip fx s/p ORIF 12/2023, admitted on 2/10 for initiation of TPN/lipids, transferred to ICU on 2/17 with worsening mental status and respiratory acidosis in spite of bipap, ultimately intubated on 2/18, being treated for PNA, also with volume overload in setting of high volume TPN. Persistent respiratory acidosis in spite of volume off, transferred to ICU for hypotension and respiratory failure, improved on bipap, now floor status again 3/1. Transferred to ICU 3/18 again with worsening hypercapnic respiratory failure.    # ESKD - TTS, LUE AVG, 3hr, 45.5kg (will be lowered), [facility], Dr. Andres Fairbanks. She has been losing weight and now even below her recent set EDW.  - With TPN condensed to 640 ml max per day, we can manage volume with HD 3x/week  - Continue HD on TTS schedule, 3.5 hr runs, had extra run yesterday for worsening pulm edema (though only able to pull 600 ml due to hypotension)  - Requires EMLA cream an hour before HD  - please avoid PICC which will compromise future dialysis accesses; a midline is much preferred for this patient         # Dialysis access: LUE AVG placed 3-4 months ago, per pt. She had arm swelling and a fistulogram a couple months ago and swelling improved but now has redeveloped.  - US with c/f possible steal syndrome  - per vascular surgery, no concern for steal syndrome, ok to go ahead with fistulogram  - given that AVF is working well on dialysis (450 BFR) and at this time IR is only able to perform fistulograms when AVF isn't working well, will defer to OP for management.     # Persistent respiratory acidosis:    - difficulty tolerating bipap due to  "claustrophobia, but wearing this lately  - balancing act between giving bicarb to maintain pH in setting of severe persistent respiratory acidosis with bicarb quickly converting CO2 and worsening overall status  - will dialyze on bicarb of 36 today for a bit more bicarb than the standard 32  - transplant pulm following    # Volume/BP: Anuric; on metoprolol soln 25 mg bid (held)  - gently challenging EDW as able, targeting 42 to 43 kg (wts quite variable, ? Accuracy)  - CXR 3/18 with worsening pulm edema  - only tolerated 600 ml fluid off yesterday, attempting 2.5 L over 3.5 hrs today using albumin  - if restarted, appreciate condensing TPN      # Nutrition: on [employer] tube feeds, appears to be off TPN yesterday and today       # Anemia 2/2 ESKD  On Venofer 50 qwk, Mircera last dose 1/9/2024  - hgb 9-10's  - Will continue Venofer 50 mcg q week (Tuesday)  - continue epogen 8000 units via HD    # BMD:   - Ca 8's, phos 5.9, alb 3.7  - sevelamer on hold       Recommendations were communicated to primary team via note and in person    RABIA Moctezuma   Division of Renal Disease and Hypertension  [phone]      Interval History :   Seen bedside, extra UF run yesterday only pulled 600 ml due to hypotension. Attempting 2.5 L today over 3.5 hours using albumin. Continues to have respiratory acidosis; discussed with ICU team and will dialyze on a bit higher bicarb dialysate today for compensation, though concern that extra bicarb in this situation in Feb led to worsening respiratory status and intubation. No apparent n/v, CP, chills. Did not attempt to talk with patient as she is on bipap that is now fitting well.    Review of Systems:   4 point ROS neg other than as noted above    Physical Exam:   I/O last 3 completed shifts:  In: 1345 [I.V.:290; NG/GT:460]  Out: 600 [Other:600]   BP 94/48   Pulse 97   Temp 98.8  F (37.1  C) (Oral)   Resp (!) 70   Ht 1.57 m (5' 1.81\")   Wt 48.4 kg (106 lb 11.2 oz)   SpO2 98%   " BMI 19.64 kg/m       GENERAL APPEARANCE: on bipap, NAD  PULM: on bipap  CV: regular rhythm and rate    1+ pedal/ankle  GI: soft   INTEGUMENT: no cyanosis, no rashes on exposed skin  NEURO: eyes closed, bipap on  Access Left AVG     Labs:   All labs reviewed by me  Electrolytes/Renal -   Recent Labs   Lab Test 03/19/24  0826 03/19/24  0342 03/19/24  0218 03/18/24  1950 03/18/24  1721 03/18/24  1715 03/18/24  1400 03/18/24  0651   NA  --   --  138  --  139  --  140 138   POTASSIUM  --   --  4.8  --  4.3  --  5.3 5.4*   CHLORIDE  --   --  99  --  99  --  100 101   CO2  --   --  25  --  27  --  25 21*   BUN  --   --  67.0*  --  58.2*  --  97.5* 88.2*   CR  --   --  3.60*  --  3.12*  --  4.60* 4.26*   * 123* 114*   < > 153*   < > 181* 108*   ESTUARDO  --   --  8.9  --  8.8  --  8.7* 9.3   MAG  --   --  2.0  --  2.0  --   --  2.4*   PHOS  --   --  5.9*  --  4.8*  --   --  6.7*    < > = values in this interval not displayed.       CBC -   Recent Labs   Lab Test 03/19/24  0021 03/18/24  1721 03/18/24  0651   WBC 8.4 7.5 10.1   HGB 9.8* 9.5* 10.7*    175 213       LFTs -   Recent Labs   Lab Test 03/19/24  0218 03/18/24  0651 03/17/24  0328   ALKPHOS 131 158* 147   BILITOTAL 0.4 0.3 0.2   ALT 12 <5 12   AST 24 19 16   PROTTOTAL 5.9* 6.1* 5.9*   ALBUMIN 3.7 3.6 3.5       Iron Panel -   Recent Labs   Lab Test 09/26/22  0555 09/03/22  1039 08/24/22  0810   IRON 54 21* 41   IRONSAT 22 9* 21   CARLOS 769* 343* 334*         Imaging:  All imaging studies reviewed by me.     Current Medications:   acetylcysteine  4 mL Nebulization 4x daily    albumin human  250 mL Intravenous Once in dialysis/CRRT    calcium carbonate-vitamin D  1 tablet Per J Tube TID w/meals    chlorhexidine  15 mL Mouth/Throat Q12H    cyanocobalamin  500 mcg Per Feeding Tube Daily    cycloSPORINE modified  200 mg Per G Tube BID IS    epoetin tre-epbx  8,000 Units Intravenous Once in dialysis/CRRT    fidaxomicin  200 mg Oral BID    heparin ANTICOAGULANT   5,000 Units Subcutaneous Q12H    heparin lock flush  5-20 mL Intracatheter Q24H    insulin aspart  1-4 Units Subcutaneous Q4H    iron sucrose  50 mg Intravenous Once in dialysis/CRRT    levalbuterol  1.25 mg Nebulization 4x Daily    levothyroxine  25 mcg Oral QAM AC    lidocaine  1 patch Transdermal Q24h    liothyronine  2.5 mcg Oral BID    [Held by provider] metoprolol  25 mg Per J Tube BID    - MEDICATION INSTRUCTIONS -   Does not apply Once    OLANZapine zydis  5 mg Oral At Bedtime    pantoprazole  40 mg Per J Tube BID    predniSONE  5 mg Per J Tube QAM    And    predniSONE  2.5 mg Per J Tube QPM    [Held by provider] sevelamer carbonate (RENVELA)  0.8 g Oral BID    sodium bicarbonate  1,300 mg Oral or Feeding Tube TID    sodium chloride (PF)  10 mL Intracatheter Q8H    sodium chloride (PF)  10-40 mL Intracatheter Q8H    sodium chloride 0.9%  300 mL Hemodialysis Machine Once    sulfamethoxazole-trimethoprim  1 tablet Oral Q Mon Wed Fri AM      dextrose      - MEDICATION INSTRUCTIONS -      - MEDICATION INSTRUCTIONS -      sodium chloride 0.9%      - MEDICATION INSTRUCTIONS -       RABIA Moctezuma

## 2024-03-19 NOTE — PROGRESS NOTES
Pulmonary Medicine  Cystic Fibrosis - Lung Transplant Team  Progress Note  2024     Patient: Sofie Rodriguez  MRN: [id number]  : 1960 (age 63 year old)  Transplant: 2022 (Lung), POD#630  Admission date: 2/10/2024    Assessment & Plan:     Sofie Rodriguez is a 63 year old female with h/o COPD s/p BSLT () with course complicated by post-operative hemidiaphragm palsy, recurrent PNAs, positive DSA, EBV viremia, hypogammaglobulinemia, severe gastroparesis s/p G/J tube placement (22) and pyloric botox (23), GI bleed /2 pyloric ulcer, hemobilia s/p ERCP and MRCP, chronic diarrhea, recurrent C diff colitis, ESRD on iHD, recurrent falls with injuries (recent right hip fx s/p ORIF 2023), deconditioning, and FTT.  Admitted 2/10 for failure to thrive and initiation of TPN/lipids.  Emergent intubation - for hypoxic and hypercapneic respiratory failure with severe respiratory acidosis and encephalopathy.  Transient improvements in recurrent acute on chronic hypercapnia respiratory failure with increased iHD, infectious workup unremarkable.  Returned to ICU - and again 3/18 d/t tenuous respiratory status complicated by variable daytime NIPPV tolerance.     Today's recommendations:  - AVAPS continuous as tolerated, anxiolytics PRN, VBG per primary team  - Repeat CSA level 3/21 (ordered today)  - Repeat DSA due , Repeat Prospera pending  - Repeat EBV pending     Acute on chronic hypoxic/hypercapneic respiratory failure:  S/p bilateral sequential lung transplant for COPD:   Hypoventilation, Suspected CARLEE: CT () with decreased MARLON opacities but new tree in bud RLL opacities.  PFTs unchanged in clinic (but ATS criteria not met), remain significantly below her baseline.  Baseline hypoxia with 2L NC overnight PTA.  S/p intubation - for respiratory decompensation with encephalopathy and severe respiratory acidosis, CT with new patchy consolidative and nodular opacities,  GGO primarily in the bases and intralobular septal thickening (concerning for pulmonary edema given recent addition of TPN nutrition).  Bronch (2/18) with very friable tissue, cutlures only with C. glabrata.  Persistent respiratory failure also complicated by hypoventilation and deconditioning d/t malnutrition.  Returned to ICU 2/28-2/29, intubation deferred given improvement with continuous NIPPV with minimal interruptions (sodium bicarb also stopped, likely partially contributing).  Neurology consulted 2/27, MRI brain (3/1) without acute intracranial pathology.  SNIFF (3/8) normal.  Metabolic CART suggested overfeeding on TPN.  NIF and FVC continue to downtrend (3/5-3/15) prompting concern for potential neuromuscular cause.  Continues with refractory severe hypercapneic respiratory failure and recurrent intolerance of extended time off NIPPV.  Returned to ICU again 3/18, respiratory panel negative, CXR with increased pulmonary edema but unable to pull enough volume with iHD (weight up 5.2 kg 3/14 to 3/19) given hypotension.  Overall, her PCO2 retention is likely multifactorial with a component being from overfeeding (though remedied with nutrition adjustment), metabolic compensation barrier d/t renal failure, pulmonary edema, bicarb loss with diarrhea, hypoventilation with declining FVC concerning for neuromuscular etiology (though neurology consult was not revealing), and NIPPV intolerance due to claustrophobia.   - AVAPS continuous as tolerated, anxiolytics PRN, VBG per primary team  - Xopenex BID  - Continue to defer ABX but low threshold to initiate with leukocytosis or fever     Immunosuppression:  AZA previously stopped 5/2023 d/t low ImmuKnow assay and EBV viremia.  ImmuKnow (2/27) remained low at 88.  Transitioned from [place] to CSA 3/11.  -  mg BID.  Goal 140-170.  Repeat level 3/21 (ordered today).  - Prednisone 5 mg qAM / 2.5 mg qPM     Prophylaxis:   - Bactrim qMWF for PJP ppx (3/13, prior  dapsone through 3/11)  - CMV ppx not currently indicated, D+/R+, CMV negative 3/18     Positive DSA: AMR treatment deferred given frailty and stable PFTs.  DSA DQB2 stable to decreased on 3/6 with 5667 mfi.  Most recent cell-free DNA Prospera (2/18) mildly elevated at 1.04 (concerning for possible rejection), which was increased from prior level of 0.12 (1/10).  IST increase deferred at that time per Dr. Gong.  S/p IVIG (2/27) for DSA (IgG WNL).  - Repeat DSA due 4/6  - Repeat Prospera (3/18) pending      EBV viremia: CT CAP (2/7) without lymphadenopathy.  Most recent level (2/18) improved to 28k from 96k (2/7)  - Repeat EBV (3/18) pending     Other relevant problems being managed by the primary team:      FTT:  Severe protein calorie malnutrition:  Gastroparesis s/p PEG/J, botox, and G-POEM:  SB hypomotility:  Pyloric ulcer:  Chronic nausea and osmotic diarrhea:  SIBO s/p rifaximin:   Recurrent C diff colitis: Chronic osmotic and infectious diarrhea since transplant with recurrent episodes of C diff.  Notable weight loss (40# in a year) d/t diarrhea, GI dysmotility, and intolerance of enteral feeds (PEG/J tube in place), most recently on elemental formula.  Extensive OP eval and f/u with GI.  S/p port placement for TPN and lipids (continued for ~5 weeks inpatient).  C diff positive (3/13), on fidaxomicin.     ESRD: CT with volume overload secondary to TPN volume, iHD increased from PTA TThSa schedule with unsustained improvement.  Management per nephrology, dialysis via [facility] C[facility].       Goals of care: Care conference held with palliative and primary team on 3/15 for medical update with pt. and daughters.  Comfort cares discussed but pt. declining at this time.  Palliative following (discussed at length with palliative provider today).    We appreciate the excellent care provided by the MICU team.  Recommendations communicated via this note.  Will continue to follow along closely, please do not hesitate to call  with any questions or concerns.    Patient discussed with Dr. Miller.    Catherine Johnson, DNP, APRN, CNP  Inpatient Nurse Practitioner  Pulmonary CF/Transplant     Subjective & Interval History:     Returned to ICU yesterday for AMS with worsening hypercapneic respiratory failure.  Slight improvement in VBG with NIPPV but not significant and with poor tolerance off therapy, though mask duration limited by anxiety and comfort.  Extra UF yesterday for only 0.6 L, limited by hypotension despite increased weight over recent days.    Review of Systems:     ROS as above, otherwise significantly limited due to lethargy and BiPAP.    Physical Exam:     All notes, images, and labs from past 24 hours (at minimum) were reviewed.    Vital signs:  Temp: 98.8  F (37.1  C) Temp src: Oral BP: 94/48 Pulse: 97   Resp: (!) 70 SpO2: 98 % O2 Device: BiPAP/CPAP Oxygen Delivery: 1 LPM   Weight: 48.4 kg (106 lb 11.2 oz)  I/O:   Intake/Output Summary (Last 24 hours) at 3/19/2024 1105  Last data filed at 3/19/2024 0700  Gross per 24 hour   Intake 840 ml   Output 600 ml   Net 240 ml     Constitutional: Lying in bed, in no apparent distress.   HEENT: Eyes with pink conjunctivae, anicteric.  Oral mucosa moist without lesions.   PULM: Mildly diminished bases bilaterally.  Few scattered crackles, no rhonchi, no wheezes.  Non-labored breathing on NIPPV 16/5/30.  CV: Normal S1 and S2.  Tachycardia.  + murmur.  No peripheral edema.  LUE fistula with + thrill/bruit.  ABD: NABS, soft, nontender, nondistended.  PEG/J tube site not visualized.  MSK: Moves all extremities. + muscle wasting.   NEURO: Sleeping, not conversant.   SKIN: Warm, dry, fragile, scattered ecchymosis.   PSYCH: Calm.     Data:     LABS    CMP:   Recent Labs   Lab 03/19/24  0826 03/19/24  0342 03/19/24  0218 03/18/24  2352 03/18/24  1950 03/18/24  1721 03/18/24  1715 03/18/24  1400 03/18/24  0651 03/17/24  1835 03/17/24  0328 03/16/24  0800 03/16/24  0651   NA  --   --  138  --   --  139   --  140 138  --  141   < > 137   POTASSIUM  --   --  4.8  --   --  4.3  --  5.3 5.4*  --  3.9   < > 4.5   CHLORIDE  --   --  99  --   --  99  --  100 101  --  103   < > 101   CO2  --   --  25  --   --  27  --  25 21*  --  27   < > 24   ANIONGAP  --   --  14  --   --  13  --  15 16*  --  11   < > 12   * 123* 114* 93   < > 153*   < > 181* 108*   < > 74   < > 104*   BUN  --   --  67.0*  --   --  58.2*  --  97.5* 88.2*  --  53.4*   < > 79.4*   CR  --   --  3.60*  --   --  3.12*  --  4.60* 4.26*  --  2.96*   < > 3.90*   GFRESTIMATED  --   --  14*  --   --  16*  --  10* 11*  --  17*   < > 12*   ESTUARDO  --   --  8.9  --   --  8.8  --  8.7* 9.3  --  8.8   < > 8.9   MAG  --   --  2.0  --   --  2.0  --   --  2.4*  --  2.2   < > 1.9   PHOS  --   --  5.9*  --   --  4.8*  --   --  6.7*  --  3.7  --  5.4*   PROTTOTAL  --   --  5.9*  --   --   --   --   --  6.1*  --  5.9*  --  5.9*   ALBUMIN  --   --  3.7  --   --   --   --   --  3.6  --  3.5  --  3.4*   BILITOTAL  --   --  0.4  --   --   --   --   --  0.3  --  0.2  --  0.3   ALKPHOS  --   --  131  --   --   --   --   --  158*  --  147  --  121   AST  --   --  24  --   --   --   --   --  19  --  16  --  17   ALT  --   --  12  --   --   --   --   --  <5  --  12  --  <5    < > = values in this interval not displayed.     CBC:   Recent Labs   Lab 03/19/24  0021 03/18/24  1721 03/18/24  0651 03/16/24  0651   WBC 8.4 7.5 10.1 7.7   RBC 2.69* 2.66* 3.00* 2.80*   HGB 9.8* 9.5* 10.7* 10.0*   HCT 33.3* 32.9* 37.8 34.8*   * 124* 126* 124*   MCH 36.4* 35.7* 35.7* 35.7*   MCHC 29.4* 28.9* 28.3* 28.7*   RDW 18.2* 18.4* 18.5* 19.0*    175 213 203       INR:   Recent Labs   Lab 03/18/24  0651   INR 1.10       Glucose:   Recent Labs   Lab 03/19/24  0826 03/19/24  0342 03/19/24  0218 03/18/24  2352 03/18/24  1950 03/18/24  1721   * 123* 114* 93 116* 153*       Blood Gas:   Recent Labs   Lab 03/19/24  0830 03/19/24  0343 03/19/24  0021   PHV 7.11* 7.15* 7.13*   PCO2V 98*  "89* 90*   PO2V 73* 60* 59*   HCO3V 31* 31* 30*   LINDY -0.4 0.3 -0.6   O2PER 36 35 35       Culture Data No results for input(s): \"CULT\" in the last 168 hours.    Virology Data:   Lab Results   Component Value Date    FLUAH1 Not Detected 03/18/2024    FLUAH3 Not Detected 03/18/2024    [id number] Not Detected 03/18/2024    IFLUB Not Detected 03/18/2024    RSVA Not Detected 03/18/2024    RSVB Not Detected 03/18/2024    PIV1 Not Detected 03/18/2024    PIV2 Not Detected 03/18/2024    PIV3 Not Detected 03/18/2024    HMPV Not Detected 03/18/2024       Historical CMV results (last 3 of prior testing):  Lab Results   Component Value Date    CMVQNT Not Detected 03/18/2024    CMVQNT Not Detected 02/19/2024    CMVQNT Not Detected 02/07/2024     Lab Results   Component Value Date    CMVLOG 3.2 07/12/2023    CMVLOG <2.1 04/19/2023    CMVLOG 3.5 01/25/2023       Urine Studies    Recent Labs   Lab Test 02/18/24  0222 05/18/23  0627   URINEPH 7.5* 5.0   NITRITE Negative Negative   LEUKEST Trace* Moderate*   WBCU 66* 21*       Most Recent [place]ze Pulmonary Function Testing (FVC/FEV1 only)  FVC-Pre   Date Value Ref Range Status   02/07/2024 1.19 L    01/10/2024 1.12 L    08/29/2023 1.48 L    07/25/2023 1.55 L      FVC-%Pred-Pre   Date Value Ref Range Status   02/07/2024 42 %    01/10/2024 39 %    08/29/2023 53 %    07/25/2023 55 %      FEV1-Pre   Date Value Ref Range Status   02/07/2024 1.13 L    01/10/2024 1.10 L    08/29/2023 1.43 L    07/25/2023 1.54 L      FEV1-%Pred-Pre   Date Value Ref Range Status   02/07/2024 51 %    01/10/2024 49 %    08/29/2023 64 %    07/25/2023 69 %        IMAGING    Recent Results (from the past 48 hour(s))   XR Chest Port 1 View    Narrative    Exam: XR CHEST PORT 1 VIEW  3/18/2024 10:07 AM     History:  s/p lung transplant, interval monitoring, hypercapnia       Comparison:  3/5/2024    Findings: Single view of the chest. Postsurgical changes of the chest  with intact clam shell sternotomy wires. Right upper " extremity PICC  tip projects over the low SVC. Stable cardiomediastinal silhouette.  Similar small pleural effusions, left greater than right. Pulmonary  artery convexity is somewhat enlarged as is left atrial appendage  convexity of the left mediastinal border. No pneumothorax. Diffuse  interstitial prominence with basilar predominant air space opacities,  increased.      Impression    Impression:   1. Increased diffuse interstitial and basilar predominant airspace  opacities, likely edema/atelectasis versus possible infection.  2. Stable small pleural effusions.  3. Mild pulmonary enlargement as present on recent chest CT. Also,  left atrial appendage enlargement.    I have personally reviewed the examination and initial interpretation  and I agree with the findings.    ALVINA HARRY MD         SYSTEM ID:  [id number]   CT Chest w/o Contrast    Narrative    EXAMINATION: CT CHEST W/O CONTRAST, 3/18/2024 7:16 PM    CLINICAL HISTORY: acute on chronic hypercapnic respiratory failure of  unclear etiology, s/p BSLT 2022    COMPARISON: Radiograph earlier same day. CT 3/7/2024..    TECHNIQUE: CT imaging obtained through the chest without contrast.  Coronal, sagittal and axial MIP reformatted images obtained and  reviewed. Noncontrast exam.    FINDINGS:    Lines, tubes, devices: Right upper extremity PICC tip terminates at  the superior cavoatrial junction.    Lungs: Changes of bilateral lung transplantation. Mucous plugging in  the airways of the right lower lobe. There is right greater than left  lower lobe and lingular consolidation with surrounding diffuse  groundglass opacities. Smooth interlobular septal thickening. The  central tracheobronchial tree is patent. No areas of bronchiectasis or  architectural distortion. No pleural effusion, pulmonary  consolidation, or pneumothorax. Mild bibasilar atelectasis.     Mediastinum: The visualized thyroid is unremarkable.Heart size is  enlarged. No pericardial effusion.  The thoracic aorta and main  pulmonary artery are within normal limits. Standard branching pattern  of the great vessels. No abnormal thoracic lymph nodes by size  criteria although difficult to evaluate on noncontrast imaging with  limited fat  structures. Numerous prominent mediastinal  lymph nodes. Patulous esophagus.    Bones and soft tissues: No suspicious osseous lesion.    Upper abdomen:  Limited evaluation of the upper abdomen. No acute  pathology of the visualized solid organs. Moderate atherosclerotic  calcifications of the abdominal aorta. Several small nonobstructing  left renal stones measuring up to 3 mm.    Diffuse body wall edema.      Impression    IMPRESSION:   Bibasilar consolidation, smooth interlobular septal, and diffuse  groundglass opacities. Given mucous plugging in the airways of the  right lower lobe concerning for aspiration pneumonia which may be  superimposed on pulmonary edema.    I have personally reviewed the examination and initial interpretation  and I agree with the findings.    YANNICK BENAVIDEZ MD         SYSTEM ID:  [id number]

## 2024-03-19 NOTE — PLAN OF CARE
Admitted/transferred from: Patient arrived from  near 1700.  This writer arrived near  1900.  Patient is somewhat lethargic, falling asleep easily, but is oriented and able to   Make needs known by using call light.  She is on BIPAP resting comfortably.    Tube feeds running at goal.  PICC line is saline locked.  Dialysis site not accessed and has   Dressing over site.  Amiodarone gtt off 3/17.  One run SVT for 63 beats.  MD aware.  Reason for admission/transfer: Possible intubated, close observation for respiratory decline.  Patient status upon admission/transfer: stable.  Interventions: Increased Bipap settings to 35 % 18/8.  VBG draws q 4 hours.    Frequent VSS.   Plan:  Vbg per order.  Following labs.  Goal Sats > 89 %.  Dialysis per schedule.    Pulmonary hygiene.  Aspiration precautions.   2 RN skin assessment: completed by this writer and nurse Lorena Canela.   Scattered bruising on arms, leg, head, neck from previous falls.   Result of skin assessment and interventions/actions:Sacral Mepilex placed,   Mepilex lite on bridge of nose to prevent Bipap pressure sore.  Bed alarm on.   Height, weight, drug calc weight: done  Patient belongings: Walker and bag of belongings in room.   MDRO education (if applicable):

## 2024-03-19 NOTE — PLAN OF CARE
Major Shift Events: Disoriented to time intermittently and confused on location in conversation. Bed alarm on. Pt attempt to get up once without call light. Moving all extremities up A+1 and walker to commode. Pt does not c/o pain. SR-ST, sustaining 140s post-ambulation to commode ~ 1700, normotensive and afebrile. Awaiting EKG regarding tachycardia and MICU resident present at bedside to assess. Toggling between Bipap and HFNC, pt without complaint of SOB, RR 20-30s, clear/dim LS. No BM, PEG tube to TF @ goal. Anuric, HD today w/2.5L off. R PICC hep locked and SL. Denies pain. PRN atarax and ativan give for anxiety r/t BiPAP. Trending Vbgs q6h, high CO2s, scheduled sodium bicarb started. Pt started mucomyst and chest physio today and had HD @ bedside.    Plan: Continue to monitor and treat tachycardia and respiratory status  For vital signs and complete assessments, please see documentation flowsheets.

## 2024-03-19 NOTE — PROGRESS NOTES
HEMODIALYSIS TREATMENT NOTE    Date: 3/19/2024  Time: 3.15 PM    Data:  Pre Wt: 48.4 kg (106 lb 11.2 oz)   Desired Wt: 2.5  kg   Post Wt: 45.9 kg (Estimated)  Weight change: 2.5 kg  Ultrafiltration - Post Run Net Total Removed (mL): 2500 mL  Vascular Access Status: Graft  patent  Dialyzer Rinse: Streaked, Light  Total Blood Volume Processed: 77.1 L   Total Dialysis (Treatment) Time: 3.5  Dialysate Bath: K 2, Ca 2.5  Heparin: None    Lab:   No    Interventions:  Pt had scheduled 3.5 hours HD through LAVG, thrill & bruit present. 2 15g needle cannulated successful.   with 600 DFR.    5% Albumin 250 mL machine prime prior HD per prescription.  25 % Albumin 50 mL given during run to support blood pressure.  Machine Bicarbonate to 36 per MD .  Pt tolerate 2.5kg UF removal, Crit-line steady with A profile at the end of HD run.  Post HD , blood rinsed back, Graft hemostasis took more than 15 minutes, clean dressing applied & handoff report given.      Assessment:  Pt alert & oriented x4, met on BiPAP, calm & co-operative.  AVG thrill & bruit present.   Monitoring every 15 minutes & PRN     Plan:    Per Renal Team

## 2024-03-20 LAB
ALBUMIN SERPL BCG-MCNC: 4 G/DL (ref 3.5–5.2)
ALP SERPL-CCNC: 115 U/L (ref 40–150)
ALT SERPL W P-5'-P-CCNC: 10 U/L (ref 0–50)
ANION GAP SERPL CALCULATED.3IONS-SCNC: 12 MMOL/L (ref 7–15)
AST SERPL W P-5'-P-CCNC: 17 U/L (ref 0–45)
ATRIAL RATE - MUSE: 276 BPM
ATRIAL RATE - MUSE: 296 BPM
ATRIAL RATE - MUSE: 318 BPM
BASE EXCESS BLDV CALC-SCNC: 4.8 MMOL/L (ref -3–3)
BASE EXCESS BLDV CALC-SCNC: 6.1 MMOL/L (ref -3–3)
BASOPHILS # BLD AUTO: ABNORMAL 10*3/UL
BASOPHILS # BLD MANUAL: 0.1 10E3/UL (ref 0–0.2)
BASOPHILS NFR BLD AUTO: ABNORMAL %
BASOPHILS NFR BLD MANUAL: 1 %
BILIRUB SERPL-MCNC: 0.4 MG/DL
BUN SERPL-MCNC: 29.4 MG/DL (ref 8–23)
CALCIUM SERPL-MCNC: 8.9 MG/DL (ref 8.8–10.2)
CHLORIDE SERPL-SCNC: 96 MMOL/L (ref 98–107)
CREAT SERPL-MCNC: 2.13 MG/DL (ref 0.51–0.95)
DEPRECATED HCO3 PLAS-SCNC: 30 MMOL/L (ref 22–29)
DIASTOLIC BLOOD PRESSURE - MUSE: NORMAL MMHG
EGFRCR SERPLBLD CKD-EPI 2021: 26 ML/MIN/1.73M2
EOSINOPHIL # BLD AUTO: ABNORMAL 10*3/UL
EOSINOPHIL # BLD MANUAL: 0 10E3/UL (ref 0–0.7)
EOSINOPHIL NFR BLD AUTO: ABNORMAL %
EOSINOPHIL NFR BLD MANUAL: 0 %
ERYTHROCYTE [DISTWIDTH] IN BLOOD BY AUTOMATED COUNT: 17.2 % (ref 10–15)
GLUCOSE BLDC GLUCOMTR-MCNC: 104 MG/DL (ref 70–99)
GLUCOSE BLDC GLUCOMTR-MCNC: 106 MG/DL (ref 70–99)
GLUCOSE BLDC GLUCOMTR-MCNC: 108 MG/DL (ref 70–99)
GLUCOSE BLDC GLUCOMTR-MCNC: 180 MG/DL (ref 70–99)
GLUCOSE BLDC GLUCOMTR-MCNC: 198 MG/DL (ref 70–99)
GLUCOSE BLDC GLUCOMTR-MCNC: 90 MG/DL (ref 70–99)
GLUCOSE SERPL-MCNC: 113 MG/DL (ref 70–99)
HCO3 BLDV-SCNC: 33 MMOL/L (ref 21–28)
HCO3 BLDV-SCNC: 35 MMOL/L (ref 21–28)
HCT VFR BLD AUTO: 29.5 % (ref 35–47)
HGB BLD-MCNC: 8.6 G/DL (ref 11.7–15.7)
IMM GRANULOCYTES # BLD: ABNORMAL 10*3/UL
IMM GRANULOCYTES NFR BLD: ABNORMAL %
INTERPRETATION ECG - MUSE: NORMAL
LYMPHOCYTES # BLD AUTO: ABNORMAL 10*3/UL
LYMPHOCYTES # BLD MANUAL: 0.7 10E3/UL (ref 0.8–5.3)
LYMPHOCYTES NFR BLD AUTO: ABNORMAL %
LYMPHOCYTES NFR BLD MANUAL: 12 %
MAGNESIUM SERPL-MCNC: 1.6 MG/DL (ref 1.7–2.3)
MAGNESIUM SERPL-MCNC: 2.8 MG/DL (ref 1.7–2.3)
MCH RBC QN AUTO: 35.1 PG (ref 26.5–33)
MCHC RBC AUTO-ENTMCNC: 29.2 G/DL (ref 31.5–36.5)
MCV RBC AUTO: 120 FL (ref 78–100)
MONOCYTES # BLD AUTO: ABNORMAL 10*3/UL
MONOCYTES # BLD MANUAL: 0.5 10E3/UL (ref 0–1.3)
MONOCYTES NFR BLD AUTO: ABNORMAL %
MONOCYTES NFR BLD MANUAL: 8 %
NEUTROPHILS # BLD AUTO: ABNORMAL 10*3/UL
NEUTROPHILS # BLD MANUAL: 4.8 10E3/UL (ref 1.6–8.3)
NEUTROPHILS NFR BLD AUTO: ABNORMAL %
NEUTROPHILS NFR BLD MANUAL: 79 %
NRBC # BLD AUTO: 0 10E3/UL
NRBC BLD AUTO-RTO: 0 /100
O2/TOTAL GAS SETTING VFR VENT: 30 %
O2/TOTAL GAS SETTING VFR VENT: 35 %
OXYHGB MFR BLDV: 76 % (ref 70–75)
OXYHGB MFR BLDV: 81 % (ref 70–75)
P AXIS - MUSE: 110 DEGREES
P AXIS - MUSE: 267 DEGREES
P AXIS - MUSE: 96 DEGREES
PCO2 BLDV: 74 MM HG (ref 40–50)
PCO2 BLDV: 77 MM HG (ref 40–50)
PH BLDV: 7.26 [PH] (ref 7.32–7.43)
PH BLDV: 7.27 [PH] (ref 7.32–7.43)
PHOSPHATE SERPL-MCNC: 4.5 MG/DL (ref 2.5–4.5)
PLAT MORPH BLD: ABNORMAL
PLATELET # BLD AUTO: 156 10E3/UL (ref 150–450)
PO2 BLDV: 46 MM HG (ref 25–47)
PO2 BLDV: 51 MM HG (ref 25–47)
POLYCHROMASIA BLD QL SMEAR: SLIGHT
POTASSIUM SERPL-SCNC: 3.4 MMOL/L (ref 3.4–5.3)
POTASSIUM SERPL-SCNC: 3.6 MMOL/L (ref 3.4–5.3)
PR INTERVAL - MUSE: NORMAL MS
PROT SERPL-MCNC: 5.8 G/DL (ref 6.4–8.3)
QRS DURATION - MUSE: 62 MS
QRS DURATION - MUSE: 68 MS
QRS DURATION - MUSE: 72 MS
QT - MUSE: 250 MS
QT - MUSE: 294 MS
QT - MUSE: 354 MS
QTC - MUSE: 392 MS
QTC - MUSE: 478 MS
QTC - MUSE: 536 MS
R AXIS - MUSE: 14 DEGREES
R AXIS - MUSE: 14 DEGREES
R AXIS - MUSE: 19 DEGREES
RBC # BLD AUTO: 2.45 10E6/UL (ref 3.8–5.2)
RBC MORPH BLD: ABNORMAL
SAO2 % BLDV: 78.1 % (ref 70–75)
SAO2 % BLDV: 83.7 % (ref 70–75)
SODIUM SERPL-SCNC: 138 MMOL/L (ref 135–145)
SYSTOLIC BLOOD PRESSURE - MUSE: NORMAL MMHG
T AXIS - MUSE: 136 DEGREES
T AXIS - MUSE: 140 DEGREES
T AXIS - MUSE: 228 DEGREES
TSH SERPL DL<=0.005 MIU/L-ACNC: 1.31 UIU/ML (ref 0.3–4.2)
VENTRICULAR RATE- MUSE: 138 BPM
VENTRICULAR RATE- MUSE: 148 BPM
VENTRICULAR RATE- MUSE: 159 BPM
WBC # BLD AUTO: 6.1 10E3/UL (ref 4–11)

## 2024-03-20 PROCEDURE — 85007 BL SMEAR W/DIFF WBC COUNT: CPT

## 2024-03-20 PROCEDURE — 250N000012 HC RX MED GY IP 250 OP 636 PS 637

## 2024-03-20 PROCEDURE — 250N000011 HC RX IP 250 OP 636

## 2024-03-20 PROCEDURE — 250N000013 HC RX MED GY IP 250 OP 250 PS 637: Performed by: INTERNAL MEDICINE

## 2024-03-20 PROCEDURE — 250N000013 HC RX MED GY IP 250 OP 250 PS 637: Performed by: STUDENT IN AN ORGANIZED HEALTH CARE EDUCATION/TRAINING PROGRAM

## 2024-03-20 PROCEDURE — 250N000013 HC RX MED GY IP 250 OP 250 PS 637

## 2024-03-20 PROCEDURE — 258N000003 HC RX IP 258 OP 636

## 2024-03-20 PROCEDURE — 90791 PSYCH DIAGNOSTIC EVALUATION: CPT | Performed by: STUDENT IN AN ORGANIZED HEALTH CARE EDUCATION/TRAINING PROGRAM

## 2024-03-20 PROCEDURE — P9047 ALBUMIN (HUMAN), 25%, 50ML: HCPCS

## 2024-03-20 PROCEDURE — 94640 AIRWAY INHALATION TREATMENT: CPT | Mod: 76

## 2024-03-20 PROCEDURE — 84132 ASSAY OF SERUM POTASSIUM: CPT

## 2024-03-20 PROCEDURE — 82805 BLOOD GASES W/O2 SATURATION: CPT | Performed by: NURSE PRACTITIONER

## 2024-03-20 PROCEDURE — 99233 SBSQ HOSP IP/OBS HIGH 50: CPT | Performed by: NURSE PRACTITIONER

## 2024-03-20 PROCEDURE — 999N000157 HC STATISTIC RCP TIME EA 10 MIN

## 2024-03-20 PROCEDURE — 99233 SBSQ HOSP IP/OBS HIGH 50: CPT | Mod: FS | Performed by: NURSE PRACTITIONER

## 2024-03-20 PROCEDURE — 94799 UNLISTED PULMONARY SVC/PX: CPT

## 2024-03-20 PROCEDURE — 94660 CPAP INITIATION&MGMT: CPT

## 2024-03-20 PROCEDURE — 120N000005 HC R&B MS OVERFLOW UMMC

## 2024-03-20 PROCEDURE — 80053 COMPREHEN METABOLIC PANEL: CPT

## 2024-03-20 PROCEDURE — 250N000012 HC RX MED GY IP 250 OP 636 PS 637: Performed by: INTERNAL MEDICINE

## 2024-03-20 PROCEDURE — 83735 ASSAY OF MAGNESIUM: CPT | Performed by: STUDENT IN AN ORGANIZED HEALTH CARE EDUCATION/TRAINING PROGRAM

## 2024-03-20 PROCEDURE — 99207 PR APP CREDIT; MD BILLING SHARED VISIT: CPT | Performed by: NURSE PRACTITIONER

## 2024-03-20 PROCEDURE — 250N000009 HC RX 250

## 2024-03-20 PROCEDURE — 83735 ASSAY OF MAGNESIUM: CPT

## 2024-03-20 PROCEDURE — 94640 AIRWAY INHALATION TREATMENT: CPT

## 2024-03-20 PROCEDURE — 84443 ASSAY THYROID STIM HORMONE: CPT | Performed by: STUDENT IN AN ORGANIZED HEALTH CARE EDUCATION/TRAINING PROGRAM

## 2024-03-20 PROCEDURE — 84100 ASSAY OF PHOSPHORUS: CPT | Performed by: STUDENT IN AN ORGANIZED HEALTH CARE EDUCATION/TRAINING PROGRAM

## 2024-03-20 PROCEDURE — 85027 COMPLETE CBC AUTOMATED: CPT

## 2024-03-20 PROCEDURE — 99233 SBSQ HOSP IP/OBS HIGH 50: CPT | Mod: FS | Performed by: PHYSICIAN ASSISTANT

## 2024-03-20 RX ORDER — MAGNESIUM SULFATE HEPTAHYDRATE 40 MG/ML
2 INJECTION, SOLUTION INTRAVENOUS ONCE
Status: COMPLETED | OUTPATIENT
Start: 2024-03-20 | End: 2024-03-20

## 2024-03-20 RX ORDER — METOPROLOL TARTRATE 25 MG/1
25 TABLET, FILM COATED ORAL 2 TIMES DAILY
Status: DISCONTINUED | OUTPATIENT
Start: 2024-03-20 | End: 2024-04-15 | Stop reason: HOSPADM

## 2024-03-20 RX ORDER — MAGNESIUM SULFATE HEPTAHYDRATE 40 MG/ML
2 INJECTION, SOLUTION INTRAVENOUS ONCE
Qty: 50 ML | Refills: 0 | Status: COMPLETED | OUTPATIENT
Start: 2024-03-20 | End: 2024-03-20

## 2024-03-20 RX ORDER — ALBUMIN (HUMAN) 12.5 G/50ML
25 SOLUTION INTRAVENOUS ONCE
Status: COMPLETED | OUTPATIENT
Start: 2024-03-20 | End: 2024-03-20

## 2024-03-20 RX ORDER — POTASSIUM CHLORIDE 7.45 MG/ML
10 INJECTION INTRAVENOUS ONCE
Status: COMPLETED | OUTPATIENT
Start: 2024-03-20 | End: 2024-03-20

## 2024-03-20 RX ADMIN — Medication 40 MG: at 20:41

## 2024-03-20 RX ADMIN — FIDAXOMICIN 200 MG: 200 TABLET, FILM COATED ORAL at 08:25

## 2024-03-20 RX ADMIN — CYCLOSPORINE 200 MG: 100 SOLUTION ORAL at 17:55

## 2024-03-20 RX ADMIN — Medication 12.5 MG: at 02:20

## 2024-03-20 RX ADMIN — LEVALBUTEROL HYDROCHLORIDE 1.25 MG: 1.25 SOLUTION RESPIRATORY (INHALATION) at 08:28

## 2024-03-20 RX ADMIN — Medication 40 MG: at 08:21

## 2024-03-20 RX ADMIN — SULFAMETHOXAZOLE AND TRIMETHOPRIM 1 TABLET: 400; 80 TABLET ORAL at 08:19

## 2024-03-20 RX ADMIN — Medication 2.5 MCG: at 08:19

## 2024-03-20 RX ADMIN — OLANZAPINE 5 MG: 5 TABLET, ORALLY DISINTEGRATING ORAL at 23:06

## 2024-03-20 RX ADMIN — ACETYLCYSTEINE 4 ML: 100 INHALANT RESPIRATORY (INHALATION) at 21:08

## 2024-03-20 RX ADMIN — INSULIN ASPART 1 UNITS: 100 INJECTION, SOLUTION INTRAVENOUS; SUBCUTANEOUS at 12:40

## 2024-03-20 RX ADMIN — LEVOTHYROXINE SODIUM 25 MCG: 0.03 TABLET ORAL at 06:24

## 2024-03-20 RX ADMIN — LEVALBUTEROL HYDROCHLORIDE 1.25 MG: 1.25 SOLUTION RESPIRATORY (INHALATION) at 17:29

## 2024-03-20 RX ADMIN — Medication 2.5 MCG: at 20:41

## 2024-03-20 RX ADMIN — LEVALBUTEROL HYDROCHLORIDE 1.25 MG: 1.25 SOLUTION RESPIRATORY (INHALATION) at 13:15

## 2024-03-20 RX ADMIN — SODIUM CHLORIDE, POTASSIUM CHLORIDE, SODIUM LACTATE AND CALCIUM CHLORIDE 500 ML: 600; 310; 30; 20 INJECTION, SOLUTION INTRAVENOUS at 01:19

## 2024-03-20 RX ADMIN — LIDOCAINE 4% 1 PATCH: 40 PATCH TOPICAL at 20:41

## 2024-03-20 RX ADMIN — ACETYLCYSTEINE 4 ML: 100 INHALANT RESPIRATORY (INHALATION) at 08:28

## 2024-03-20 RX ADMIN — SODIUM BICARBONATE 650 MG TABLET 1300 MG: at 08:22

## 2024-03-20 RX ADMIN — SODIUM BICARBONATE 650 MG TABLET 1300 MG: at 14:12

## 2024-03-20 RX ADMIN — MAGNESIUM SULFATE HEPTAHYDRATE 2 G: 2 INJECTION, SOLUTION INTRAVENOUS at 03:50

## 2024-03-20 RX ADMIN — INSULIN ASPART 1 UNITS: 100 INJECTION, SOLUTION INTRAVENOUS; SUBCUTANEOUS at 23:13

## 2024-03-20 RX ADMIN — POTASSIUM CHLORIDE 10 MEQ: 7.46 INJECTION, SOLUTION INTRAVENOUS at 03:50

## 2024-03-20 RX ADMIN — HEPARIN SODIUM 5000 UNITS: 5000 INJECTION, SOLUTION INTRAVENOUS; SUBCUTANEOUS at 06:24

## 2024-03-20 RX ADMIN — AMIODARONE HYDROCHLORIDE 1 MG/MIN: 50 INJECTION, SOLUTION INTRAVENOUS at 09:27

## 2024-03-20 RX ADMIN — ACETYLCYSTEINE 4 ML: 100 INHALANT RESPIRATORY (INHALATION) at 17:29

## 2024-03-20 RX ADMIN — LEVALBUTEROL HYDROCHLORIDE 1.25 MG: 1.25 SOLUTION RESPIRATORY (INHALATION) at 21:08

## 2024-03-20 RX ADMIN — AMIODARONE HYDROCHLORIDE 150 MG: 1.5 INJECTION, SOLUTION INTRAVENOUS at 09:17

## 2024-03-20 RX ADMIN — CYCLOSPORINE 200 MG: 100 SOLUTION ORAL at 08:22

## 2024-03-20 RX ADMIN — CALCIUM CARBONATE 600 MG (1,500 MG)-VITAMIN D3 400 UNIT TABLET 1 TABLET: at 17:55

## 2024-03-20 RX ADMIN — ALBUMIN HUMAN 25 G: 0.25 SOLUTION INTRAVENOUS at 01:19

## 2024-03-20 RX ADMIN — CALCIUM CARBONATE 600 MG (1,500 MG)-VITAMIN D3 400 UNIT TABLET 1 TABLET: at 08:19

## 2024-03-20 RX ADMIN — PREDNISONE 2.5 MG: 2.5 TABLET ORAL at 20:40

## 2024-03-20 RX ADMIN — SODIUM BICARBONATE 650 MG TABLET 1300 MG: at 20:40

## 2024-03-20 RX ADMIN — ACETYLCYSTEINE 4 ML: 100 INHALANT RESPIRATORY (INHALATION) at 13:15

## 2024-03-20 RX ADMIN — Medication 12.5 MG: at 08:19

## 2024-03-20 RX ADMIN — CALCIUM CARBONATE 600 MG (1,500 MG)-VITAMIN D3 400 UNIT TABLET 1 TABLET: at 12:37

## 2024-03-20 RX ADMIN — MAGNESIUM SULFATE HEPTAHYDRATE 2 G: 2 INJECTION, SOLUTION INTRAVENOUS at 09:51

## 2024-03-20 RX ADMIN — CYANOCOBALAMIN TAB 500 MCG 500 MCG: 500 TAB at 08:19

## 2024-03-20 RX ADMIN — HEPARIN SODIUM 5000 UNITS: 5000 INJECTION, SOLUTION INTRAVENOUS; SUBCUTANEOUS at 17:55

## 2024-03-20 RX ADMIN — FIDAXOMICIN 200 MG: 200 TABLET, FILM COATED ORAL at 20:41

## 2024-03-20 RX ADMIN — PREDNISONE 5 MG: 5 TABLET ORAL at 08:19

## 2024-03-20 RX ADMIN — METOPROLOL TARTRATE 25 MG: 25 TABLET, FILM COATED ORAL at 20:40

## 2024-03-20 ASSESSMENT — ACTIVITIES OF DAILY LIVING (ADL)
ADLS_ACUITY_SCORE: 34
ADLS_ACUITY_SCORE: 34
ADLS_ACUITY_SCORE: 36
ADLS_ACUITY_SCORE: 34
ADLS_ACUITY_SCORE: 36
ADLS_ACUITY_SCORE: 34
ADLS_ACUITY_SCORE: 34
ADLS_ACUITY_SCORE: 35
ADLS_ACUITY_SCORE: 36
ADLS_ACUITY_SCORE: 34
ADLS_ACUITY_SCORE: 36
ADLS_ACUITY_SCORE: 34
ADLS_ACUITY_SCORE: 36
ADLS_ACUITY_SCORE: 34
ADLS_ACUITY_SCORE: 36
ADLS_ACUITY_SCORE: 34
ADLS_ACUITY_SCORE: 36
ADLS_ACUITY_SCORE: 34
ADLS_ACUITY_SCORE: 34

## 2024-03-20 NOTE — PROGRESS NOTES
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 On 2/17/24 she was transferred to ICU for worsening mental status and acute hypoxic and hypercarbic respiratory failure inspite of BiPAP requiring intubation. She was suspected to have PNA and started on antibiotics. Extubated on 2/19 without complications. Transitioned back to PO/TF from TPN. Developed progressively worsening respiratory acidosis and hypercarbia, and was re-admitted to ICU on 2/28/24 for close monitoring of intermittent BiPAP and possible intubation, however she improved on AVAPS setting and well enough to transfer back to medicine floor on 2/29/24. Transferred back to ICU 3/18 for recurrent hypercarbia with significant respiratory acidosis. Bicarb supplementation started, transferred to the floor 3/20. Goals include A. Flutter control and BiPAP adherence with control of anxiety/claustrophobia. GoC conversations ongoing.       #Severe respiratory acidosis, improved on BiPAP  #Acute on chronic hypoxic<hypercarbic respiratory failure  #S/P Lung transplant 2022 c/b right hemidiaphragm  palsy  #Recurrent pneumonia, resolved  #Mucous plugging  #Suspected CARLEE  Patient received a bilateral lung transplant 6/28/22 for COPD. Was admitted 2/10 to address malnutrition and admitted to ICU on 2/17 with hypoxic hypercarbic respiratory failure. Intubated on 2/18 AM  due to worsening oxygen requirements, likely due to pulmonary edema given hx of ESRD requiring HD vs infection given hx of lung transplant, immunosuppressed status, and recurrent pneumonias. Extubated on 2/19 without complications. Has had issues with rising pCO2 that is responsive to BiPAP, but due to refusal, has required transferred to ICU (on 2/28 AM). Neurology consulted and MRI r/o central cause problem for respiratory drive. Started on AVAPS with improvement in mental status and VBG, and patient transferred back to medicine floor on 2/29.  Required another brief ICU stay 3/18 - 3/20, but did not require intubation during this time. Issues with anxiety/claustrophobia while using the mask, Atarax has helped though Zyprexa led to hallucinations.   - VBGs after ICU transfer with pH mid 7.2s and pCO2 mid 70s.   - Oral bicarb 1300mg TID + high bicarb dialysis bath  - Continue BiPAP 16/5 with longer breaks on HFNC   - PRN hypertonic saline inhaler with albuterol nebs  - Continue secretion clearance: Xopenex + Mucomyst, acapella and CPT   - Low threshold for ABx  - Transplant pulmonology following, recs appreciated  - PTA immunosuppressive agent  >Prednisone 5 mg every morning, 2.5 mg every afternoon; Stop tacrolimus 3/10 and replaced with Cyclosporin 200mg BID   > overnight oximetry study suggestive of O2 vs CPAP need, as did require up to 2LPM overnight to prevent hypoxia  > Try to minimize O2 to preserve respiratory drive, will give IVIG for DSA+   - Stopped PTA dapsone MWF for PJP prophylaxis; replaced with Bactrim (3/11)    - Limit medications that would depress respiration, ok to trial low dose benzo  - D/c'd theophylline 3/3/24 due to  difficulty attaining therapeutic levels (started by ICU), could consider Modafinil   - repeat metabolic CART study when stable on enteral feeds, off TPN (per [facility]ul[facility])  - sleep clinic eval at discharge     Antibiotics:  Vancomycin (2/17 - 2/17)  Zosyn (2/17 - 2/23) for HAP  Bactrim MWF, PJP ppx (previously on Dapsone)  Micafungin (2/18- 2/21)  Fidaxomicin (3/13-*3/22)     #A-fib, A-flutter (recurrent)  Noted atrial fibrillation on arrival with HR elevated at 150, not sustained for > 10 minutes and otherwise hemodynamically stable. RVR resolved w/o medications.  PTA metoprolol (25mg BID) has been held through much of admission. On 3/17 new Aflutter developed overnight. HR 150s with flutter waves on EKG. Metoprolol administered along with Mg infusion with minimal response. Patient was then given amiodarone bolus and placed on drip which slightly lowered her HR in 120-130s. This was held for borderline hypotension 3/18, but restarted again with amiodarone 3/19.   - MAP goal > 65 mmHg  - Metoprolol tartrate dose 25 mg BID  - Amio bolus and 24 hours infusion, given recurrence will discuss duration with pulm transplant  - Continuous telemetry  - Recheck K+     #Severe malnutrition   #FTT   #Hypoalbuminemia  #Gastroparesis, small bowel dysmotility  #S/P PEG/J with intolerance of enteral nutrition  #Chronic osmotic diarrhea/SIBO s/p Rifaximin  #Recurrent C.Diff (3rd ep)    #GERD  Patient with gastroparesis (presumed due to vagal injury) and small bowel dysmotility complicated by unintentional 40lb weight loss over the past year and now severe malnutrition. Previously intolerant to oral food intake due to nausea. Was initially admitted for portacath and TPN initiation since 2/13. After extubation has been able to tolerate feeds without n/v from 2/20.  Per GI, next steps for workup for malnutrition would include CT enterography to evaluate for anatomic abnormalities contributing to malnutrition vs possible treatment for SIBO  (though patient has had multiple courses of treatment in the past with no long term improvement). Patient couldn't tolerate the oral load for CT enterography on 2/21, so will defer this until she is able to tolerate greater PO load. On 2/23 , again discussed with patient the need of small bowel motility study if not improving outpatient through [facility]. No ongoing concerns for SIBO on 2/23 as patient is passing multiple episodes of stools and gas with no abdominal pain or distention. C-diff test negative on 2/21. Per RD, patient tolerating >300 kcal of intake PO currently, so stopped trickle Tube feeds and continuing with PO and TPN for nutrition (sufficient for preventing gut atrophy). As of 3/11 transplant pulmonology is worried that TPN is not a realistic plan to continue at home and would like to transition back to TF (RD oncerned pt is not absorbing any oral intake). Since transitioned off TPN. Transplant pulm aslo worried about mucosal toxicity. Repeat enteric studies showed recurrent C.diff;  Fidaxomicin x 10 days started (GI approved). Transplant pulm requesting repeat colonoscopy w/ Bx after completion of c.diff treatment, to r/o toxicity or other reason that diarrhea is present.  - FLD + Restarted tube feeds (with goal of 35 mL/hr) if doing well off BiPAP  - GI consulted/signed off; appreciate recs              -PO Fidaxomicin 200 mg BID for 10 days- diarrhea has improved since start of tx               -May reconsult GI after tx of C.diff for future colonoscopy if diarrhea returns   - May benefit from small bowel motility study if not improving outpatient through [facility].   - PPI BID  - PRN bowel regimen     #B/L Neuropathic Pain   New pain b/l over the past few days. Last A1c <4.2 (7/11/23). End stage renal dz can cause it too, TSH wnl. B12 elevated. B6 normal. Copper low (56.3 (), zinc 48.3 (mildly low).   -Consider gabapentin in the coming days   -Neuro Recs:  -No obvious clinical history or exam  findings to suggest neurologic/neuromuscular causes of respiratory weakness  -Neuropathy labs currently pending  -Consider stopping B12     #Acute on chronic anemia 2/2 ESRD  Hgb stable 7-8s, with no acute signs of bleeding. Aute drop 2/29 from 8.2 > 6.6 after two rechecks, no overt signs of bleeding; required 1U pRBC transfusion.  Stable.   - continue epogen per nephrology with dialysis  - venofer 50 mcg qweek with dialysis     #ESRD on HD  #Hypervolemic hyponatremia, resolved  #Anion gap metabolic acidosis, resolved  #Mild hyperphosphatemia  Patient is ESRD on T/Th/Sat HD as outpt, Hgb stabilizing. HD tolerating well. Briefly required extra Monday runs, not since being off TPN. She would prefer longer sessions to more days.   - CBC and CMP daily  - Strict I/Os  - Daily weight   - Increased bicarb bath     # Hypothyroidism, possibly transient   Patient with new low T4 at 0.64 and TSH at 6.5, concerning for new hypothyroidism vs sick thyroid syndrome. TSH with appropriate response, more consistent with elevation in the setting of acute illness. ICU team on 2/28 recommended initiation of treatment for possible hypothyroid as this can improve respiratory muscle function in the setting of weakness and malnutrition.   - continue liothyronine and levothyroxine per ICU team recommendations     #Left upper extremity unilateral edema, improving   #Bilateral pedal and ankle edema, improving  Edema due to third spacing due to hypoalbuminemia vs HF. BNP >11970 at admission, Echo on 2/18 shows EF of 55-60% with collapsible IVC. Extremity edema 2/2 to hypoalbuminemia. Upper limb duplex USG on 2/18 negative for DVT.  USG left arm on 2/21 shows steel physiology and Vascular Surgery consulted (see below). Overall edema in extremities have improved significantly  - Continue daily weights  - Strict I/Os  - Elevation and wrapping of only lower legs as needed and increased UF per nephrology for volume management; no wrapping of Left  upper extremity with dialysis fistula  - HD as noted above      #Steal physiology of LUE dialysis access fistula  LUE arterial US obtained 2/21 with concern for LUE edema. US of fistula demonstrated steal physiology. Discussed with nephrology, and vascular surgery consulted on 2/22. Vascular surgery has only minor concerns for steal symptoms and given that the AVF is working well, they are okay with outpatient fistulograms/ venoplasty with wrist brachial index and PPG's, unless new concerns arise.  - plan for outpatient workup as above; nephrology in agreement with outpatient workup.     # Right hip fracture s/p ORIF (December 2023)   - PT/OT     # Hx EBV viremia   # Hypogammaglobulinemia  # Chronic immunosuppression 2/2 lung transplant  Patient has been afebrile and has not had leukocytosis however she is under immunosuppression. Given acute respiratory failure and hx of recurrent pneumonias, she was started on empiric abx for concerns over new pneumonia. RVP and cultures no growth to date.   - EBV 27K (decreased compared to prior)   - Did receive IVIG      #C  Care conference on 3/15 with Transplant Pulm, [facility], Medicine, daughters (Julia Nash) and Transplant SW. Overall medical updates provided and Qs answered. With declining respiratory acidosis on labs, discussed code status 3/18. Patient initially not desiring any escalation of her care to ICU/intubation with frustration re overall course. However, after discussing with her daughter on the phone she and family elected to remain full code and agreed to ICU admission and intubation if necessary. Still full code  - Transplant pulm requesting care conference this week           Diet: Adult Formula Drip Feeding: Continuous Eneida [place] J&B Renal Support; Jejunostomy; Goal Rate: 35 mL/hr (goal rate) held for 1 hour before/after Synthroid administration, per PharmD; mL/hr  Regular Diet Adult    DVT Prophylaxis: Heparin SQ  Dong Catheter: Not present  Fluids: TF,  PO  Lines: PRESENT      PICC 03/04/24 Double Lumen Right Basilic TPN. PICC okay to use.-Site Assessment: WDL  Hemodialysis Vascular Access Arteriovenous graft Superior Arm-Site Assessment: Thrill present;Bruit present      Cardiac Monitoring: ACTIVE order. Indication: ICU  Code Status: Full Code      Clinically Significant Risk Factors            # Hypomagnesemia: Lowest Mg = 1.6 mg/dL in last 2 days, will replace as needed   # Hypoalbuminemia: Lowest albumin = 2.6 g/dL at 2/18/2024  5:13 AM, will monitor as appropriate             # Severe Malnutrition: based on nutrition assessment    # Financial/Environmental Concerns: none         Disposition Plan         The patient's care was discussed with the Attending Physician, Dr. Lazo  .    Veto Mercedes MD, MA  PGY-1, Internal Medicine  [facility]  [phone]    Hospitalist Service, [facility]  Securely message with [employer] (more info)  Text page via [employer] Paging/Directory   See signed in provider for up to date coverage information  ______________________________________________________________________    Interval History   Summary of events during the ICU stay:    Sofie was transferred to the ICU for management of respiratory acidosis with significant hypercapnia, recurrent A-fib/flutter, and borderline hypotension.  He was continued on AVAPS during her ICU stay, and Atarax was increased in order to better tolerate this.  Zyprexa was trialed but resulted in hallucinations and was stopped.  CT of the chest performed on the 18th was notable for bibasilar consolidation with groundglass opacities and concern for possible mucous plugging in the right lower lobe.  Xopenex and Mucomyst nebs were added.  In order to address her respiratory acidosis, 3 times daily bicarb supplementation was initiated and her dialysis bath was adjusted.  Modafinil or theophylline was suggested to improve CO2 clearance.   Overnight 3/20 she did develop recurrent a flutter with rates in the 140s.  She was reinitiated on amiodarone and metoprolol which had previously been stopped for borderline hypotension.    Currently mid 90s on 33% FiO2 30L HFNC. AVAPS 16/5 when using mask.  No headache or dizziness.  Her breathing feels well.  Tolerating BiPAP more than prior.  Mood is improved. Less sleepy.  No chest pain.  No abdominal pain.  Having 3-4 bowel movements a day which are still somewhat loose.  Swelling in her arms and legs is improved.  No new joint swellings or rash/wounds.  No clinical signs of bleeding.    Physical Exam   Vital Signs: Temp: 98.9  F (37.2  C) Temp src: Axillary BP: 133/78 Pulse: 107   Resp: 27 SpO2: 100 % O2 Device: High Flow Nasal Cannula (HFNC) Oxygen Delivery: 30 LPM  Weight: 106 lbs 11.24 oz    General Appearance: Comfortable appearing while laying in bed on BiPAP.  Frail.  Respiratory: No respiratory distress on BiPAP.  Lungs with scant crackles.  No wheezing.  No cough.  Cardiovascular: Tachycardia with irregular rate.  No JVD or peripheral edema.  No abnormal sounds  GI: Abdomen is firm but nondistended, nontympanitic, no peritoneal signs.  No focal areas of tenderness.  No palpable organomegaly.  Skin: Scattered ecchymoses and superficial wounds in various stages of healing on the bilateral arms.  Lines in place as above.  Other: Alert and oriented x 4.  Extremities spontaneously.  Grossly normal sensorium.  No obvious focal deficits.  Mood is good and affect congruent.  Word choice, behavior, eye contact all appropriate.    Medical Decision Making             Data     I have personally reviewed the following data over the past 24 hrs:    6.1  \   8.6 (L)   / 156     138 96 (L) 29.4 (H) /  104 (H)   3.4 30 (H) 2.13 (H) \     ALT: 10 AST: 17 AP: 115 TBILI: 0.4   ALB: 4.0 TOT PROTEIN: 5.8 (L) LIPASE: N/A     TSH: 1.31 T4: N/A A1C: N/A

## 2024-03-20 NOTE — PLAN OF CARE
D/I: Pt alert and following commands. Switching between BiPAP and high flow. HR aflutter, blood pressure stable. Amio bolus given and drip started. Tolerating regular diet. Good appetite. Tube feeds at goal. Several loose stool. Denies pain.   A: HR aflutter, vital signs stable. Tolerating BiPAP off.   P: Continue to monitor and update MD with concerns.

## 2024-03-20 NOTE — PROGRESS NOTES
Pulmonary Medicine  Cystic Fibrosis - Lung Transplant Team  Progress Note  2024     Patient: Sofie Rodriguez  MRN: [id number]  : 1960 (age 63 year old)  Transplant: 2022 (Lung), POD#[id number]  Admission date: 2/10/2024    Assessment & Plan:     Sofie Rodriguez is a 63 year old female with h/o COPD s/p BSLT () with course complicated by post-operative hemidiaphragm palsy, recurrent PNAs, positive DSA, EBV viremia, hypogammaglobulinemia, severe gastroparesis s/p G/J tube placement (22) and pyloric botox (23), GI bleed / pyloric ulcer, hemobilia s/p ERCP and MRCP, chronic diarrhea, recurrent C diff colitis, ESRD on iHD, recurrent falls with injuries (recent right hip fx s/p ORIF 2023), deconditioning, and FTT.  Admitted 2/10 for failure to thrive and initiation of TPN/lipids.  Emergent intubation - for hypoxic and hypercapneic respiratory failure with severe respiratory acidosis and encephalopathy.  Transient improvements in recurrent acute on chronic hypercapnia respiratory failure with increased iHD, infectious workup unremarkable.  Returned to ICU - and again 3/18 d/t tenuous respiratory status complicated by variable daytime NIPPV tolerance.     Today's recommendations:  - AVAPS continuous as tolerated, anxiolytics PRN, VBG per primary team  - Trial of modafinil to start tomorrow per primary (deferred given today d/t persistent tachycardia after iHD yesterday)  - Repeat CSA level ordered 3/21  - Repeat DSA due , Repeat Prospera pending  - Agree with transfer to medicine floor given stability with improved NIPPV  - Tentative plan for care conference for medical update and long term planning in coming days     Acute on chronic hypoxic/hypercapneic respiratory failure:  S/p bilateral sequential lung transplant for COPD:   Hypoventilation, Suspected CARLEE: CT () with decreased MARLON opacities but new tree in bud RLL opacities.  PFTs unchanged in clinic (but ATS  criteria not met), remain significantly below her baseline.  Baseline hypoxia with 2L NC overnight PTA.  S/p intubation 2/17-2/19 for respiratory decompensation with encephalopathy and severe respiratory acidosis, CT with new patchy consolidative and nodular opacities, GGO primarily in the bases and intralobular septal thickening (concerning for pulmonary edema given recent addition of TPN nutrition).  Bronch (2/18) with very friable tissue, cutlures only with C. glabrata.  Persistent respiratory failure also complicated by hypoventilation and deconditioning d/t malnutrition.  Returned to ICU 2/28-2/29, intubation deferred given improvement with continuous NIPPV with minimal interruptions (sodium bicarb also stopped, likely partially contributing).  Neurology consulted 2/27, MRI brain (3/1) without acute intracranial pathology.  SNIFF (3/8) normal.  Metabolic CART suggested overfeeding on TPN.  NIF and FVC continue to downtrend (3/5-3/15) prompting concern for potential neuromuscular cause.  Continues with refractory severe hypercapneic respiratory failure and recurrent intolerance of extended time off NIPPV.  Returned to ICU again 3/18, respiratory panel negative, CXR with increased pulmonary edema but unable to pull enough volume with iHD (weight up 5.2 kg 3/14 to 3/19) given hypotension.  Overall, her PCO2 retention is likely multifactorial with a component being from overfeeding (though remedied with nutrition adjustment), metabolic compensation barrier d/t renal failure, pulmonary edema, bicarb loss with diarrhea, hypoventilation with declining FVC concerning for neuromuscular etiology (though neurology consult was not revealing), and NIPPV intolerance due to claustrophobia.  Increased adherence to BiPAP with subsequent improvement in VBG.    - AVAPS continuous as tolerated, anxiolytics PRN, VBG per primary team  - Xopenex QID, Mucomyst QID and Volera QID (3/19 per primary)  - Continue to defer ABX but low  threshold to initiate with leukocytosis or fever  - Trial of modafinil to start tomorrow per primary (deferred given today d/t persistent tachycardia after iHD yesterday)  - Agree with transfer to medicine floor given stability with improved NIPPV     Immunosuppression:  AZA previously stopped 5/2023 d/t low ImmuKnow assay and EBV viremia.  ImmuKnow (2/27) remained low at 88.  Transitioned from [place] to CSA 3/11.  -  mg BID.  Goal 140-170.  Repeat level 3/21 (ordered 3/19).  - Prednisone 5 mg qAM / 2.5 mg qPM     Prophylaxis:   - Bactrim qMWF for PJP ppx (3/13, prior dapsone through 3/11)  - CMV ppx not currently indicated, D+/R+, CMV negative 3/18     Positive DSA: [facility] treatment deferred given frailty and stable PFTs.  DSA DQB2 stable to decreased on 3/6 with 5667 mfi.  Most recent cell-free DNA Prospera (2/18) mildly elevated at 1.04 (concerning for possible rejection), which was increased from prior level of 0.12 (1/10).  IST increase deferred at that time per Dr. Gong.  S/p IVIG (2/27) for DSA (IgG WNL).  - Repeat DSA due 4/6  - Repeat Prospera (3/18) pending      EBV viremia: CT CAP (2/7) without lymphadenopathy.  Most recent level (2/18) improved to 28k from 96k (2/7).  Repeat EBV (3/18) 23k.     Other relevant problems being managed by the primary team:      FTT:  Severe protein calorie malnutrition:  Gastroparesis s/p PEG/J, botox, and G-POEM:  SB hypomotility:  Pyloric ulcer:  Chronic nausea and osmotic diarrhea:  SIBO s/p rifaximin:   Recurrent C diff colitis: Chronic osmotic and infectious diarrhea since transplant with recurrent episodes of C diff.  Notable weight loss (40# in a year) d/t diarrhea, GI dysmotility, and intolerance of enteral feeds (PEG/J tube in place), most recently on elemental formula.  Extensive OP eval and f/u with GI.  S/p port placement for TPN and lipids (continued for ~5 weeks inpatient).  C diff positive (3/13), on fidaxomicin.     ESRD: CT with volume overload secondary  to TPN volume, iHD increased from PTA TThSa schedule with unsustained improvement.  Management per nephrology, dialysis via [facility] C[facility].       Goals of care: Care conference held with palliative and primary team on 3/15 for medical update with pt. and daughters.  Comfort cares discussed but pt. declining at this time.  Palliative following (discussed at length with palliative provider 3/19).    - Tentative plan for care conference for medical update and long term planning in coming days     We appreciate the excellent care provided by the MICU team.  Recommendations communicated via this note.  Will continue to follow along closely, please do not hesitate to call with any questions or concerns.     Patient discussed with Dr. Miller.    Catherine Johnson, DNP, APRN, CNP  Inpatient Nurse Practitioner  Pulmonary CF/Transplant     Subjective & Interval History:     Increased adherence to BiPAP with subsequent improvement in VBG.  No new cough or sputum.  HD run for 2.5L off yesterday, but with SVT vs afib RVR post-HD requiring IVF resuscitation.    Review of Systems:     C: No fever, no chills  INTEGUMENTARY/SKIN: No rash or obvious new lesions  ENT/MOUTH: No nasal congestion or drainage  RESP: See interval history  CV: No chest pain, no palpitations, no peripheral edema, no orthopnea  GI: No nausea, no vomiting, + frequent loose stools, no reflux symptoms  : Anuric  MUSCULOSKELETAL: No myalgias, no arthralgias  ENDOCRINE: Blood sugars with adequate control  NEURO: + Occ headache  PSYCHIATRIC: + Occ hallucinations    Physical Exam:     All notes, images, and labs from past 24 hours (at minimum) were reviewed.    Vital signs:  Temp: 98.9  F (37.2  C) Temp src: Axillary BP: (!) 73/64 Pulse: 100   Resp: (!) 34 SpO2: 98 % O2 Device: High Flow Nasal Cannula (HFNC) Oxygen Delivery: 30 LPM   Weight: 48.4 kg (106 lb 11.2 oz)  I/O:   Intake/Output Summary (Last 24 hours) at 3/20/2024 1417  Last data filed at 3/20/2024 1400  Gross  per 24 hour   Intake 2785.5 ml   Output 2500 ml   Net 285.5 ml     Constitutional: Lying supine in bed, in no apparent distress.   HEENT: Eyes with pink conjunctivae, anicteric.   PULM: Diminished air flow bilaterally.  No crackles, no rhonchi, no wheezes.  Non-labored breathing on BiPAP 12/5/30.  CV: Normal S1 and S2.  Tachycardia.  + murmur.  No peripheral edema.  LUE AV fistula not assessed.  ABD: NABS, soft, nontender, nondistended.  PEG/J site not visualized.  MSK: Moves all extremities.  + muscle wasting.   NEURO: Sleepy but arousable, minimally conversant.   SKIN: Warm, dry, fragile, scattered ecchymosis.   PSYCH: Mood stable.     Data:     LABS    CMP:   Recent Labs   Lab 03/20/24  1257 03/20/24  1240 03/20/24  0840 03/20/24  0354 03/20/24  0225 03/19/24  0342 03/19/24  0218 03/18/24  1950 03/18/24  1721 03/18/24  1715 03/18/24  1400 03/18/24  0651 03/17/24  1835 03/17/24  0328   NA  --   --   --   --  138  --  138  --  139  --  140 138  --  141   POTASSIUM  --   --   --   --  3.4  --  4.8  --  4.3  --  5.3 5.4*  --  3.9   CHLORIDE  --   --   --   --  96*  --  99  --  99  --  100 101  --  103   CO2  --   --   --   --  30*  --  25  --  27  --  25 21*  --  27   ANIONGAP  --   --   --   --  12  --  14  --  13  --  15 16*  --  11   GLC  --  198* 108* 90 113*   < > 114*   < > 153*   < > 181* 108*   < > 74   BUN  --   --   --   --  29.4*  --  67.0*  --  58.2*  --  97.5* 88.2*  --  53.4*   CR  --   --   --   --  2.13*  --  3.60*  --  3.12*  --  4.60* 4.26*  --  2.96*   GFRESTIMATED  --   --   --   --  26*  --  14*  --  16*  --  10* 11*  --  17*   ESTUARDO  --   --   --   --  8.9  --  8.9  --  8.8  --  8.7* 9.3  --  8.8   MAG 2.8*  --   --   --  1.6*  --  2.0  --  2.0  --   --  2.4*  --  2.2   PHOS 4.5  --   --   --   --   --  5.9*  --  4.8*  --   --  6.7*  --  3.7   PROTTOTAL  --   --   --   --  5.8*  --  5.9*  --   --   --   --  6.1*  --  5.9*   ALBUMIN  --   --   --   --  4.0  --  3.7  --   --   --   --  3.6  --  3.5  "  BILITOTAL  --   --   --   --  0.4  --  0.4  --   --   --   --  0.3  --  0.2   ALKPHOS  --   --   --   --  115  --  131  --   --   --   --  158*  --  147   AST  --   --   --   --  17 --  24  --   --   --   --  19  --  16   ALT  --   --   --   --  10  --  12  --   --   --   --  <5  --  12    < > = values in this interval not displayed.     CBC:   Recent Labs   Lab 03/20/24  0225 03/19/24  0021 03/18/24  1721 03/18/24  0651   WBC 6.1 8.4 7.5 10.1   RBC 2.45* 2.69* 2.66* 3.00*   HGB 8.6* 9.8* 9.5* 10.7*   HCT 29.5* 33.3* 32.9* 37.8   * 124* 124* 126*   MCH 35.1* 36.4* 35.7* 35.7*   MCHC 29.2* 29.4* 28.9* 28.3*   RDW 17.2* 18.2* 18.4* 18.5*    170 175 213       INR:   Recent Labs   Lab 03/18/24  0651   INR 1.10       Glucose:   Recent Labs   Lab 03/20/24  1240 03/20/24  0840 03/20/24  0354 03/20/24  0225 03/19/24  2350 03/19/24  2033   * 108* 90 113* 106* 91       Blood Gas:   Recent Labs   Lab 03/20/24  1257 03/20/24  0225 03/19/24  2144   PHV 7.27* 7.26* 7.27*   PCO2V 74* 77* 78*   PO2V 46 51* 51*   HCO3V 33* 35* 36*   LINDY 4.8* 6.1* 6.9*   O2PER 35 30 33       Culture Data No results for input(s): \"CULT\" in the last 168 hours.    Virology Data:   Lab Results   Component Value Date    FLUAH1 Not Detected 03/18/2024    FLUAH3 Not Detected 03/18/2024    PT7855 Not Detected 03/18/2024    IFLUB Not Detected 03/18/2024    RSVA Not Detected 03/18/2024    RSVB Not Detected 03/18/2024    PIV1 Not Detected 03/18/2024    PIV2 Not Detected 03/18/2024    PIV3 Not Detected 03/18/2024    HMPV Not Detected 03/18/2024       Historical CMV results (last 3 of prior testing):  Lab Results   Component Value Date    CMVQNT Not Detected 03/18/2024    CMVQNT Not Detected 02/19/2024    CMVQNT Not Detected 02/07/2024     Lab Results   Component Value Date    CMVLOG 3.2 07/12/2023    CMVLOG <2.1 04/19/2023    CMVLOG 3.5 01/25/2023       Urine Studies    Recent Labs   Lab Test 02/18/24  0222 05/18/23  0627   URINEPH 7.5* " 5.0   NITRITE Negative Negative   LEUKEST Trace* Moderate*   WBCU 66* 21*       Most Recent [place]ze Pulmonary Function Testing (FVC/FEV1 only)  FVC-Pre   Date Value Ref Range Status   02/07/2024 1.19 L    01/10/2024 1.12 L    08/29/2023 1.48 L    07/25/2023 1.55 L      FVC-%Pred-Pre   Date Value Ref Range Status   02/07/2024 42 %    01/10/2024 39 %    08/29/2023 53 %    07/25/2023 55 %      FEV1-Pre   Date Value Ref Range Status   02/07/2024 1.13 L    01/10/2024 1.10 L    08/29/2023 1.43 L    07/25/2023 1.54 L      FEV1-%Pred-Pre   Date Value Ref Range Status   02/07/2024 51 %    01/10/2024 49 %    08/29/2023 64 %    07/25/2023 69 %        IMAGING    Recent Results (from the past 48 hour(s))   CT Chest w/o Contrast    Narrative    EXAMINATION: CT CHEST W/O CONTRAST, 3/18/2024 7:16 PM    CLINICAL HISTORY: acute on chronic hypercapnic respiratory failure of  unclear etiology, s/p BSLT 2022    COMPARISON: Radiograph earlier same day. CT 3/7/2024..    TECHNIQUE: CT imaging obtained through the chest without contrast.  Coronal, sagittal and axial MIP reformatted images obtained and  reviewed. Noncontrast exam.    FINDINGS:    Lines, tubes, devices: Right upper extremity PICC tip terminates at  the superior cavoatrial junction.    Lungs: Changes of bilateral lung transplantation. Mucous plugging in  the airways of the right lower lobe. There is right greater than left  lower lobe and lingular consolidation with surrounding diffuse  groundglass opacities. Smooth interlobular septal thickening. The  central tracheobronchial tree is patent. No areas of bronchiectasis or  architectural distortion. No pleural effusion, pulmonary  consolidation, or pneumothorax. Mild bibasilar atelectasis.     Mediastinum: The visualized thyroid is unremarkable.Heart size is  enlarged. No pericardial effusion. The thoracic aorta and main  pulmonary artery are within normal limits. Standard branching pattern  of the great vessels. No abnormal  thoracic lymph nodes by size  criteria although difficult to evaluate on noncontrast imaging with  limited fat  structures. Numerous prominent mediastinal  lymph nodes. Patulous esophagus.    Bones and soft tissues: No suspicious osseous lesion.    Upper abdomen:  Limited evaluation of the upper abdomen. No acute  pathology of the visualized solid organs. Moderate atherosclerotic  calcifications of the abdominal aorta. Several small nonobstructing  left renal stones measuring up to 3 mm.    Diffuse body wall edema.      Impression    IMPRESSION:   Bibasilar consolidation, smooth interlobular septal, and diffuse  groundglass opacities. Given mucous plugging in the airways of the  right lower lobe concerning for aspiration pneumonia which may be  superimposed on pulmonary edema.    I have personally reviewed the examination and initial interpretation  and I agree with the findings.    YANNICK BENAVIDEZ MD         SYSTEM ID:  [id number]

## 2024-03-20 NOTE — PROGRESS NOTES
Nephrology Progress Note  03/20/2024         Assessment & Recommendations:   Sofie Rodriguez is a 63 year old year old female with ESKD, COPD s/p b/l lung transplant in 6/2022 with multiple complications, gastroparesis s/p GJ tube, GIB, chronic diarrhea, recurrent c-diff, FTT with inability to tolerate any tube feeds, R hip fx s/p ORIF 12/2023, admitted on 2/10 for initiation of TPN/lipids, transferred to ICU on 2/17 with worsening mental status and respiratory acidosis in spite of bipap, ultimately intubated on 2/18, being treated for PNA, also with volume overload in setting of high volume TPN. Persistent respiratory acidosis in spite of volume off, transferred to ICU for hypotension and respiratory failure, improved on bipap, now floor status again 3/1. Transferred to ICU 3/18 again with worsening hypercapnic respiratory failure.    # ESKD - TTS, LUE AVG, 3hr, 45.5kg (will be lowered), [facility], Dr. Andres Fairbanks. She has been losing weight and now even below her recent set EDW.  - With TPN condensed to 640 ml max per day, we can manage volume with HD 3x/week  - Continue HD on TTS schedule   - Requires EMLA cream an hour before HD  - please avoid PICC which will compromise future dialysis accesses; a midline is much preferred for this patient         # Dialysis access: LUE AVG placed 3-4 months ago, per pt. She had arm swelling and a fistulogram a couple months ago and swelling improved but now has redeveloped.  - US with c/f possible steal syndrome  - per vascular surgery, no concern for steal syndrome, ok to go ahead with fistulogram  - given that AVF is working well on dialysis (450 BFR) and at this time IR is only able to perform fistulograms when AVF isn't working well, will defer to OP for management.     # Persistent respiratory acidosis:    - difficulty tolerating bipap due to claustrophobia, but wearing this lately  - balancing act between giving bicarb to maintain pH in setting of severe  "persistent respiratory acidosis with bicarb quickly converting CO2 and worsening overall status  - per MICU, was started on bicarb 1300 mg tid  - transplant pulm following    # Aflutter  # Volume/BP: Anuric; on metoprolol soln 25 mg bid   - gently challenging EDW as able, targeting 42 to 43 kg (wts quite variable, ? Accuracy)  - CXR 3/18 with worsening pulm edema, had done extra run on Monday for more fluid off  - UF 2.5L yesterday; unfortunately was given back 1.5L in the evening as was thought to be in sinus tachycardia; rate didn't improve and was found to be recurrent aflutter; was restarted on BB (had been stopped due to hypotension) and amio        # Nutrition: on [employer] tube feeds, appears to be off TPN yesterday and today       # Anemia 2/2 ESKD  On Venofer 50 qwk, Mircera last dose 1/9/2024  - hgb 9-10's  - Will continue Venofer 50 mcg q week (Tuesday)  - continue epogen 8000 units via HD    # BMD:   - Ca 8's, phos 5.9, alb 4.0  - sevelamer on hold, will monitor and restart if phos remain elevated      Recommendations were communicated to primary team via note     RABIA Moctezuma   Division of Renal Disease and Hypertension  [phone]      Interval History :   Seen bedside, sitting up, comfortable on high flow O2, trying to eat but feels full quickly. UF 2.5 yesterday but was given back 1.5L last night as was thought to be in sinus tach. Later understood to have recurrent aflutter and was restarted on BB and amio. Also was started on PO bicarb by MICU. No current n/v, CP, chills    Review of Systems:   4 point ROS neg other than as noted above    Physical Exam:   I/O last 3 completed shifts:  In: 2730 [I.V.:1000; NG/GT:360; IV Piggyback:500]  Out: 2500 [Other:2500]   /79   Pulse 107   Temp 97.9  F (36.6  C) (Oral)   Resp 30   Ht 1.57 m (5' 1.81\")   Wt 48.4 kg (106 lb 11.2 oz)   SpO2 97%   BMI 19.64 kg/m       GENERAL APPEARANCE: NAD  PULM: on high flow O2  CV: RRR    trace " pedal/ankle  GI: soft   INTEGUMENT: no cyanosis, no rashes on exposed skin  NEURO: a/o3  Access Left AVG     Labs:   All labs reviewed by me  Electrolytes/Renal -   Recent Labs   Lab Test 03/20/24  0840 03/20/24  0354 03/20/24  0225 03/19/24  0342 03/19/24  0218 03/18/24  1950 03/18/24  1721 03/18/24  1400 03/18/24  0651   NA  --   --  138  --  138  --  139   < > 138   POTASSIUM  --   --  3.4  --  4.8  --  4.3   < > 5.4*   CHLORIDE  --   --  96*  --  99  --  99   < > 101   CO2  --   --  30*  --  25  --  27   < > 21*   BUN  --   --  29.4*  --  67.0*  --  58.2*   < > 88.2*   CR  --   --  2.13*  --  3.60*  --  3.12*   < > 4.26*   * 90 113*   < > 114*   < > 153*   < > 108*   ESTUARDO  --   --  8.9  --  8.9  --  8.8   < > 9.3   MAG  --   --  1.6*  --  2.0  --  2.0  --  2.4*   PHOS  --   --   --   --  5.9*  --  4.8*  --  6.7*    < > = values in this interval not displayed.       CBC -   Recent Labs   Lab Test 03/20/24 0225 03/19/24  0021 03/18/24  1721   WBC 6.1 8.4 7.5   HGB 8.6* 9.8* 9.5*    170 175       LFTs -   Recent Labs   Lab Test 03/20/24  0225 03/19/24  0218 03/18/24  0651   ALKPHOS 115 131 158*   BILITOTAL 0.4 0.4 0.3   ALT 10 12 <5   AST 17 24 19   PROTTOTAL 5.8* 5.9* 6.1*   ALBUMIN 4.0 3.7 3.6       Iron Panel -   Recent Labs   Lab Test 09/26/22  0555 09/03/22  1039 08/24/22  0810   IRON 54 21* 41   IRONSAT 22 9* 21   CARLOS 769* 343* 334*         Imaging:  All imaging studies reviewed by me.     Current Medications:   acetylcysteine  4 mL Nebulization 4x daily    calcium carbonate-vitamin D  1 tablet Per J Tube TID w/meals    cyanocobalamin  500 mcg Per Feeding Tube Daily    cycloSPORINE modified  200 mg Per G Tube BID IS    fidaxomicin  200 mg Oral BID    heparin ANTICOAGULANT  5,000 Units Subcutaneous Q12H    heparin lock flush  5-20 mL Intracatheter Q24H    insulin aspart  1-4 Units Subcutaneous Q4H    levalbuterol  1.25 mg Nebulization 4x Daily    levothyroxine  25 mcg Oral QAM AC    lidocaine  1  patch Transdermal Q24h    liothyronine  2.5 mcg Oral BID    metoprolol tartrate  25 mg Per J Tube BID    OLANZapine zydis  5 mg Oral At Bedtime    pantoprazole  40 mg Per J Tube BID    predniSONE  5 mg Per J Tube QAM    And    predniSONE  2.5 mg Per J Tube QPM    [Held by provider] sevelamer carbonate (RENVELA)  0.8 g Oral BID    sodium bicarbonate  1,300 mg Oral or Feeding Tube TID    sodium chloride (PF)  10 mL Intracatheter Q8H    sodium chloride (PF)  10-40 mL Intracatheter Q8H    sulfamethoxazole-trimethoprim  1 tablet Oral Q Mon Wed Fri AM      amiodarone 1 mg/min (03/20/24 1200)    amiodarone      dextrose      - MEDICATION INSTRUCTIONS -      - MEDICATION INSTRUCTIONS -      sodium chloride 0.9%       RABIA Moctezuma

## 2024-03-20 NOTE — PROGRESS NOTES
ASSESSMENT & PLAN: Shreya Weller is a 56 y o  No obstetric history on file  with normal gynecologic exam     1   Routine well woman exam done today  2    Pap and HPV:Pap with HPV was done today  Current ASCCP Guidelines reviewed  She has a history of cervical cancer in situ  We will get the op report and path report from the surgery center done in 2009 at [facility]  3  Mammogram ordered  Recommend yearly mammography  4   Colonoscopy recently done, following with Dr Hui Sims   5  The patient is sexually active  6  The following were reviewed in today's visit: breast self exam, exercise and healthy diet  7  Patient to return to office in 12 months for annual exam, sooner as indicated based on test results  All questions have been answered to her satisfaction  CC:  Annual Gynecologic Examination    HPI: Shreya Weller is a 56 y o  No obstetric history on file  who presents for annual gynecologic examination  She has the following concerns:  Seeing Eyvazzadeh due to HPV in colon found on colonoscopy  She is s/p hysterectomy in 2009 for fibroids, painful periods  She had a supracervical hysterectomy, then later had her cervix removed  Dr Hui Sims recommended she see me before proceeding with her next procedure  She thinks she did have abnormal paps in the past  Also c/o no sex drive  We discussed that decreased sex drive is often multifactorial and that there is no quick fix  The main stays of treatment for decreased sex drive is communication with her partner, lubrication and masturbation  Patient is currently in therapy  After searching through [facility], I did find a diagnosis of cervical carcinoma in situ  She no longer has a uterus or cervix  We discussed a vaginal cuff Pap  If there is abnormalities, I would likely refer back to Dr Brandon Montelongo who did her initial surgeries  Health Maintenance:    She exercises 7 days per week  She wears her seatbelt routinely      She Brief plan of care:    Prior to evening shift change, pt returned from hemodialysis w/ aflutter w/ rvr in 140's or so, normotensive. 2.5L taken off. Bedside POCUS w/ IVC ~1.3cm w/ respiratory variation. Over course of evening received total of 1.5L LR along w/ 25g 25% Albumin, did not seem to affect rates. On further chart review, intermittent flutter has been a concern, previously on beta blockade though had been discontinued a couple days ago due to hypotension. Had also briefly been on Amiodarone 3/17-3/18. Given otherwise hemodynamic stability, opted to trial restarting Metoprolol tartrate 12.5mg BID w/ dose given overnight. Lytes also checked - K down to 3.4 and Mag down to 1.6 after dialysis. No plans for dialysis today. Gentle repletion in setting of arrhythmia.    Serial gases relatively stable overnight despite BiPAP.    Juan J Costello MD  Internal Medicine PGY-3  MICU Nights   does perform regular monthly self breast exams  She feels safe at home  Patients does follow a DASH diet  Last mammogram: current  Last colonoscopy: current       Past Medical History:   Diagnosis Date    Arthritis     DDD    HPV (human papilloma virus) infection 12/2017    found on colonoscopy    Stress     Wears glasses        Past Surgical History:   Procedure Laterality Date    BILATERAL OOPHORECTOMY      COLONOSCOPY      COLONOSCOPY N/A 12/27/2017    Procedure: COLONOSCOPY;  Surgeon: Trista Newman MD;  Location: [facility]; Service: Gastroenterology    HYSTERECTOMY      partial    LIPOSUCTION      lipoma    TONSILLECTOMY      WISDOM TOOTH EXTRACTION      x4       Past OB/Gyn History:   No LMP recorded  Patient has had a hysterectomy  Menstrual History:  OB History     No data available           No LMP recorded  Patient has had a hysterectomy  Menstrual history: Patient is post menopausal    History of sexually transmitted infection: HPV  Patient is currently sexually active  heterosexual Birth control: postmenopausal  Last Pap; 10 years ago :  unsure    Family History   Problem Relation Age of Onset    Other Mother      damage to diaphragm s/p surgery    Rheum arthritis Mother     Stroke Mother 61     mini strokes    Diabetes type II Mother 61    Bipolar disorder Mother     Breast cancer Sister 36    Hypertension Paternal Grandmother 79    Addiction problem Son     Bipolar disorder Son     Alcohol abuse Son        Social History:  Social History     Social History    Marital status: /Civil Union     Spouse name: N/A    Number of children: N/A    Years of education: N/A     Occupational History    Not on file       Social History Main Topics    Smoking status: Current Every Day Smoker     Packs/day: 0 40     Years: 10 00    Smokeless tobacco: Never Used    Alcohol use Yes      Comment: rarely    Drug use: No    Sexual activity: Yes     Other Topics Concern    Not on file     Social History Narrative    No narrative on file     Presently lives with   Patient is   Patient is currently employed - works from home  Allergies   Allergen Reactions    Sulfa Antibiotics Rash       Current Outpatient Prescriptions:     ALPRAZolam (XANAX PO), Take by mouth every morning, Disp: , Rfl:     ALPRAZolam (XANAX) 0 25 mg tablet, Take 1 tablet by mouth daily as needed, Disp: , Rfl:     B Complex Vitamins (B COMPLEX 1 PO), Take 1 capsule by mouth daily, Disp: , Rfl:     BIOTIN PO, Take by mouth every morning, Disp: , Rfl:     Boswellia-Glucosamine-Vit D (GLUCOSAMINE COMPLEX PO), Take 1 tablet by mouth daily, Disp: , Rfl:     Calcium Citrate-Vitamin D (CALCIUM CITRATE + D PO), Take by mouth every morning, Disp: , Rfl:     EVENING PRIMROSE OIL PO, Take by mouth every morning, Disp: , Rfl:     FLUoxetine (PROzac) 20 mg capsule, Take 1 capsule (20 mg total) by mouth daily, Disp: 90 capsule, Rfl: 0    Ginkgo Biloba (GINKOBA PO), Take by mouth every morning, Disp: , Rfl:     GLUTATHIONE PO, Take by mouth every morning, Disp: , Rfl:     Misc Natural Products (GLUCOSAMINE CHONDROITIN TRIPLE PO), Take by mouth every morning, Disp: , Rfl:     multivitamin (THERAGRAN) TABS, Take 1 tablet by mouth every morning, Disp: , Rfl:     NON FORMULARY, PQQ-enzyme, Disp: , Rfl:     NON FORMULARY, every morning Asian ginseng, Disp: , Rfl:     Probiotic Product (PROBIOTIC-10 PO), Take 1 capsule by mouth daily, Disp: , Rfl:     varenicline (CHANTIX CONTINUING MONTH FELIPA) 1 mg tablet, Take by mouth, Disp: , Rfl:     Review of Systems:  A complete review of systems was performed and was negative, except as listed  Denies  urinary incontinence, urinary frequency, constipation or bowel changes, blood in stool, severe headaches, vaginal bleeding, vaginal discharge, vaginal dryness     Positive for feeling tired, weight loss (diet and exercise), nasal discharge, hoarseness, dry skin  Physical Exam:  /64   Ht 5' 5" (1 651 m)   Wt 91 9 kg (202 lb 9 6 oz)   BMI 33 71 kg/m²    GEN: The patient was alert and oriented x3, pleasant well-appearing female in no acute distress  HEENT:  Unremarkable, no anterior or posterior lymphadenopathy, no thyromegaly  CV:  RRR, no murmurs  RESP:  Clear to auscultation bilaterally  BREAST:  Symmetric breasts with no palpable breast masses or obvious breast lesions  She has no retractions or nipple discharge  She has no axillary abnormalities or palpable masses  Self breast exam is taught  ABD:  Soft, nontender, non-distended  EXT: nontender, no edema  PELVIC:  Normal appearing external female genitalia, atrophic vaginal epithelium, No discharge  Cervix absent   Bimanual:  No palpable masses  Cuff appears intact with no nodularity is  A vaginal Pap was collected

## 2024-03-20 NOTE — PROGRESS NOTES
Major Shift Events:  Pt. Remained ST in the 140s most of the evening. MICU aware, several orders placed and carried out. 1.5L LR administered. Mg and K replaced; 25g of 25% Albumin administered; PO metoprolol administered. Remains Tachy. 8-9 stools overnight. Denies pain, discomfort, nausea, numbness, tingling.     Plan: Continue with plan of care.  For vital signs and complete assessments, please see documentation flowsheets.

## 2024-03-20 NOTE — PROGRESS NOTES
MEDICAL ICU PROGRESS NOTE  03/20/2024      Date of Service (when I saw the patient): 03/20/2024    ASSESSMENT: Sofie Rodriguez is a 63 year old female with PMH COPD s/p bilateral lung transplant 6/28/22 c/b hemidiaphragm palsy and recurrent pneumonias, gastroparesis and small bowel dysmotility complicated by severe malnutrition now s/p PEG/J, ESRD on T/Th/Sat HD, recent R femoral fx s/p ORIF, chronic diarrhea, recurrent c-diff, failure to thrive with inability to tolerate any tube feeds, who was admitted to [facility] on 2/10/24 for concerns over malnutrition and TPN initiation via portacath. Course complicated by recurrent and progressive acute on chronic hypoxic and hypercarbic respiratory failure requiring ICU admission and intubation 2/18-2/19 and a second ICU admission 2/28-2/29 for continuous BiPAP. Again with worsening respiratory acidosis and hypercarbia requiring transfer back to ICU 3/18/2024.       CHANGES and MAJOR THINGS TODAY:   Trial off BiPAP, check VBG after 2 hours  Start modafinil tomorrow pending improved heart rate  Amiodarone bolus and infusion  Increase PO metoprolol back to PTA dose of 25 mg BID  Full liquid diet  Stop bedtime olanzapine  Additional magnesium replacement       PLAN:  Neuro:  # Pain  No complaints of pain this morning. Patient has multiple bruises over her arms and face which is attributed to previous falls.   - Tylenol availble Q4h prn  - Lidocaine patch daily  - Heating pads prn     # Claustrophobia  Reports claustrophobia contributing to limited compliance with BiPAP  - Ativan 0.25 mg PRN for anxiety, none used in last 24 hours  - Hydroxyzine 25 mg TID  - Discontinue olanzapine 5 mg at bedtime with report of hallucinations overnight   - Discussed with patient, she is amenable to health psych to explore coping mechanisms to tolerate bipap      # Bilateral neuropathic pain   New pain b/l over the past few days. Last A1c <4.2 (7/11/23). Consider possibly 2/2 ESRD. TSH  wnl. B12 elevated. B6 normal. Copper low (56.3 (), B1, B3 pending, zinc 48.3 (mildly low)   - Consider gabapentin in the coming days   - Neuro Recs:  - No obvious clinical history or exam findings to suggest neurologic/neuromuscular causes of respiratory weakness  - Neuropathy labs currently pending     Pulmonary:  # Acute on chronic hypoxic and hypercarbic respiratory failure  # Acute on chronic respiratory acidosis  # R hemidiaphragm palsy  # Recurrent PNAs   # Positive DSA   # Suspected CARLEE  # PTA nocturnal O2, 2L   S/p bilateral lung transplant 6/28/22 for COPD. Brief inpatient pulmonary history:  2/10 admission to address malnutrition  2/18 intubated for hypoxic respiratory failure; differential edema vs infection; bronch with only C. glabrata  2/19 extubated and ongoing hypercarbia with intermittent BiPAP compliance 2/2 claustrophobia  2/27 neurology consult query neuromuscular etiology for hypercarbia --> MRI negative and exam reassuring against neuromuscular etiology  2/28 ICU admission, continuous BiPAP, trial theophylline (stopped concerned benefits may not outweigh risks and difficulty getting therapeutic levels)  3/5 FVC//-40  3/8 SNIFF test without paradoxical movement of bilateral diaphragm  3/12 metabolic CART suggested overfeeding  3/15 FVC//-25  3/16 FVC//-33     Over the course of admission have considered multiple causes of respiratory failure including infection, volume overload, overfeeding, neuromuscular weakness. Currently, concerned kidneys are unable to compensate for consistently elevated pCO2 resulting in acidosis. Historically, she was given oral bicarb 2/26-2/28, but it was stopped concerned it may ultimately be worsening her hypercarbia. Chest CT 3/18 with bibasilar consolidation and GGOs, concern for mucous plugging in RLL.  - Continue BiPAP 16/5 with longer breaks on HFNC, will obtain VBG after 2 hours off BiPAP   - Continue oral bicarb; consider using  higher bicarb bath for dialysis going forward (used 36 instead of usual 32 on 3/19/24)  - Health psychology consult for coping mechanisms to tolerate BiPAP  - Continue secretion clearance: Xopenex + Mucomyst, acapella and CPT  - Consider adding modafinil tomorrow when HR better controlled     # S/p BSLT 6/8/22 for COPD  - Pulm transplant following  - Immunosuppression: prednisone 5 qam and 2.5 qpm, cyclosporine 200 mg BID (started 3/11, tacro stopped)  - Infecious ppx: Bactrim MWF  - Prospera 3/18 pending     Cardiovascular:  # A-flutter (recurrent on 3/17, 2/19)  # A-fib, intermittent  # HTN  # HFpEF  PTA metoprolol (25mg BID) has been held through much of admission. On 3/17 new atrial fflutter developed overnight with RVR to 150s required amiodarone. Amio gtt stopped 3/18. Back in atrial flutter again evening of 3/19 with rates in 140s, given volume back after 2.5L removed during dialysis that day without improvement in rate. TTE 2/17 with LVEF 55-60%, global RV function normal, no significant valvular abnormalities  - MAP goal > 65 mmHg  - Metoprolol tartrate restarted last night at 12.5 mg BID (PTA dose 25 mg BID)  - Amio bolus and 24 hours infusion, given recurrence will discuss duration with pulm transplant  - Continuous telemetry      GI/Nutrition:  # Severe malnutrition   # Failure to thrive  # Hypoalbuminemia  # Gastroparesis, small bowel dysmotility  # S/P PEG/J with intolerance of enteral nutrition  # Chronic osmotic diarrhea/SIBO s/p Rifaximin  # Recurrent C.Diff (3rd ep)    # GERD  - Nutrition consulted, appreciate assistance  - TPN discontinued 3/16  - Start full liquid diet and advance if doing well off BiPAP later today   - TF at goal rate 35 mL/hour  - GI consulted/signed off; appreciate recs              -PO Fidaxomicin 200 mg BID for 10 days - diarrhea has improved since start of tx               -May reconsult GI after tx of C.diff for future colonoscopy +/- biopsy    - May benefit from small  bowel motility study if not improving outpatient through [facility]  - PPI BID  - Bowel regimen PRN      Renal/Fluids/Electrolytes:  # ESRD on HD  # Hypervolemic hyponatremia, resolved  # Anion gap metabolic acidosis, resolved  # Mild hyperphosphatemia  Patient is ESRD on T//Sat HD as outpt.  - HD per typical schedule today, increase HCO3 bath  - Continue oral bicarb 1,300 mg TID - can decrease this if bicarb gets too high using higher bath in dialysis  - CMP, Mg, Phos daily  - Strict I/Os  - Daily weight  - Renally adjust medications     Endocrine:  # Hyperglycemia, stress induced  - Low dose sliding scale insulin   - Hypoglycemia protocol     # Hypothyroidism  Patient with new low T4 at 0.64 and TSH at 6.5, concerning for new hypothyroidism vs sick thyroid syndrome. TSH with appropriate response, more consistent with elevation in the setting of acute illness. ICU team on  recommended initiation of treatment for possible hypothyroid as this can improve respiratory muscle function in the setting of weakness and malnutrition. 3/7 repeat thyroid function WNL.  - Continue liothyronine and levothyroxine per ICU team recommendations     ID:  # Recurrent C. diff colitis  # Recurrent pneumonia  # Hx EBV viremia   # Hypogammaglobulinemia  # Chronic immunosuppression  lung transplant  Empirically treated for pneumonia  - , RVP and cultures no growth to date. Recurrent C.Diff Positive 3/12.   -  Ig  - EBV 27K on , 22K on 3/18       Antibiotics:  Fidaxomicin (3/13-*3/22)  Micafungin (- )  Zosyn ( - ) for HAP  Vancomycin ( - )  Bactrim MWF, PJP ppx (previously on Dapsone)     Hematology:    # Acute on chronic anemia  ESRD  Hgb stable, with no acute signs of bleeding.   - Daily CBC  - Transfuse for Hgb < 7  - Continue epogen per nephrology with dialysis  - Venofer 50 mcg qweek with dialysis     # Steal physiology of LUE dialysis access fistula  LUE arterial US obtained   with concern for LUE edema. US of fistula demonstrated steal physiology. Discussed with nephrology, and vascular surgery consulted on 2/22. Vascular surgery has only minor concerns for steal symptoms and given that the AVF is working well, they are okay with outpatient fistulograms/venoplasty with wrist brachial index and PPG's, unless new concerns arise.  - Plan for outpatient workup as above; nephrology in agreement with outpatient workup.     Musculoskeletal:  # Right hip fracture s/p ORIF (December 2023)   - PT/OT     Skin:  # Left upper extremity unilateral edema, improving   # Bilateral pedal and ankle edema, improving  Upper limb duplex USG on 2/18 negative for DVT.  USG left arm on 2/21 shows steel physiology and Vascular Surgery consulted (see above).   - Elevation and wrapping of only lower legs as needed and increased UF per nephrology for volume management; no wrapping of left upper extremity with dialysis fistula  - Lymphedema consulted but signed off, but didn't tolerate wraps     General Cares/Prophylaxis:    DVT Prophylaxis: Heparin SQ  GI Prophylaxis: PPI  Restraints: N/A  Family Communication: Will update daughter later today. Noted palliative recommendation for another care conference - will discuss with transplant and participate if she remains in the ICU.  Code Status: FULL     Lines/tubes/drains:  - PIV  - PEG/J  - Left arm AV fistula  - PICC line     Disposition:  - Transfer to medicine this afternoon pending stability off bipap and HR control     Patient seen and findings/plan discussed with medical ICU staff, Dr. Liao.  Critical Care time: 35 min     RUFUS Fierro CNP    Attending note:  Patient seen, examined and discussed with the EMILY. All data reviewed. Agree with the assessment and plan as outlined in the above note.  Overall much better, decreased PCO2 and correcting HCO3.  More alert and interactive this am. Tolerating BiPAP overnight.  Episode of recurrent a-flutter  "overnight, received beta blocker, will give bolus of amiodarone, may need gtt.  Will start Modofenil once heart rate is improved.    Ana Liao MD  [phone]    Clinically Significant Risk Factors            # Hypomagnesemia: Lowest Mg = 1.6 mg/dL in last 2 days, will replace as needed   # Hypoalbuminemia: Lowest albumin = 2.6 g/dL at 2/18/2024  5:13 AM, will monitor as appropriate             # Severe Malnutrition: based on nutrition assessment    # Financial/Environmental Concerns: none            ====================================  INTERVAL HISTORY:   Went into atrial flutter with RVR to 140s yesterday evening. Was given 1.5L LR and 25g 25% albumin concerned consecutive days of volume removal on dialysis was contributing with no improvement in rate. Metoprolol tartrate was started at 12.5 mg. First evaluated on bipap this morning, denied shortness of breath, chest pain, palpitations, nausea, abdominal pain, numbness/tingling. On later assessment on rounds she did endorse some hallucinations last night after getting a dose of medication to help her \"tolerate the bipap.\"    OBJECTIVE:   1. VITAL SIGNS:   Temp:  [97  F (36.1  C)-99.2  F (37.3  C)] 97.1  F (36.2  C)  Pulse:  [] 141  Resp:  [17-74] 18  BP: ()/(53-84) 117/73  FiO2 (%):  [30 %-35 %] 30 %  SpO2:  [94 %-100 %] 98 %  FiO2 (%): 30 %  Resp: 18    2. INTAKE/ OUTPUT:   I/O last 3 completed shifts:  In: 2695 [I.V.:1000; NG/GT:360; IV Piggyback:500]  Out: 2500 [Other:2500]    3. PHYSICAL EXAMINATION:  General: awakens easily to voice  HEENT: Mucous membranes moist  Neuro: Awake and alert, no focal deficits, moving all extremities to command and spontaneously  Pulm/Resp: Diminished breath sounds bilaterally, no accessory muscle use  CV: RRR, tachycardic, S1/S2 without m/r/g; pedal pulses 2+ without LE edema  Abdomen: Soft, non-distended, non-tender  : No urine assessed  Incisions/Skin: PICC and PEG site without surrounding erythema, no rashes " noted    4. LABS:   Arterial Blood Gases   Recent Labs   Lab 03/19/24  1159   PH 7.25*   PCO2 77*   PO2 109*   HCO3 34*     Complete Blood Count   Recent Labs   Lab 03/20/24 0225 03/19/24  0021 03/18/24  1721 03/18/24  0651   WBC 6.1 8.4 7.5 10.1   HGB 8.6* 9.8* 9.5* 10.7*    170 175 213     Basic Metabolic Panel  Recent Labs   Lab 03/20/24  0354 03/20/24 0225 03/19/24  2350 03/19/24  2033 03/19/24  0342 03/19/24  0218 03/18/24  1950 03/18/24  1721 03/18/24  1715 03/18/24  1400   NA  --  138  --   --   --  138  --  139  --  140   POTASSIUM  --  3.4  --   --   --  4.8  --  4.3  --  5.3   CHLORIDE  --  96*  --   --   --  99  --  99  --  100   CO2  --  30*  --   --   --  25  --  27  --  25   BUN  --  29.4*  --   --   --  67.0*  --  58.2*  --  97.5*   CR  --  2.13*  --   --   --  3.60*  --  3.12*  --  4.60*   GLC 90 113* 106* 91   < > 114*   < > 153*   < > 181*    < > = values in this interval not displayed.     Liver Function Tests  Recent Labs   Lab 03/20/24 0225 03/19/24 0218 03/18/24  0651 03/17/24  0328   AST 17 24 19 16   ALT 10 12 <5 12   ALKPHOS 115 131 158* 147   BILITOTAL 0.4 0.4 0.3 0.2   ALBUMIN 4.0 3.7 3.6 3.5   INR  --   --  1.10  --      Coagulation Profile  Recent Labs   Lab 03/18/24  0651   INR 1.10       5. RADIOLOGY:   No results found for this or any previous visit (from the past 24 hour(s)).

## 2024-03-20 NOTE — CONSULTS
"  Health Psychology                                                                                                                          Irina Nuñez, Ph.D., L.P [phone]  Amie Soto, Ph.D., L.P. [phone]  Jane Espinal, Ph.D, L..P. [phone]  Amalia Hale, Ph.D., L.P. [phone]  Lucio Celis, Ph.D., A.B.P.P., L.P. [phone]         Rosaura Whitmore, Ph.D., L.P. [phone]       Julia Capmos, Ph.D., A.B.P.P., LP [phone]           [facility], 3rd Floor  [address] [place] [address]      Confidential Summary of Inpatient Health Psychology Consultation*    Date of Service:  03/20/24    Referring Provider:  RUFUS Campbell, CNP - MICU    Reason for Consultation:  Coping with anxiety re: BiPAP, patient reporting sense of claustrophobia.     Sources of Information:  Information was obtained from a clinical interview with the patient and review of medical record.    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 Course complicated by recurrent and progressive acute on chronic hypoxic and hypercarbic respiratory failure requiring ICU admission and intubation 2/18-2/19 and a second ICU admission 2/28-2/29 for continuous BiPAP. Again with worsening respiratory acidosis and hypercarbia requiring transfer back to ICU 3/18/2024.\"    Met with Sofie to introduce Health Psychology and discuss nature of referral. She shared experiences with past years to " months with health issues. She said at times she has felt like giving up but ultimately wants to keep fighting. Currently feeling hopeful and motivated to engage in her recovery. Reported good mood, although irritability at times. She describes herself as a positive, optimistic person but has had more negative thoughts and frustration with admission. She said she has been short with nursing staff at times but apologizes when she can and takes responsibility. Denied symptoms of major depression. This writer validated Sofie's emotional experiences, normalized the range of emotions that can accompany patient's in the hospital.     Re: BiPAP use, Sofie said that today and lately it has been better, she has been much less anxious and able to tolerate wearing it. She identified specific strategies the team implemented that have helped including pairing anti-anxiety medication with the BiPAP use and showing Sofie how she can unbuckle/unlock the BiPAP so she feels she has more control over her own comfort. We discussed additional strategies that may help Sofie tolerate the BiPAP more and limit need to abort its use or prolong its use. Guided her through a grounding practice and discussed distraction techniques. Acknowledged the limitations of these while also encouraging her to give them a shot. She expressed understanding and said she could see herself using them.       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    Hepatitis 2017    Hep C, [facility]    History of blood transfusion     HTN (hypertension)     Infectious mononucleosis     Lung infection 11/30/2022    Osteopenia        Past Surgical  History:   Procedure Laterality Date    BRONCHOSCOPY (RIGID OR FLEXIBLE), DIAGNOSTIC N/A 08/02/2022    Procedure: BRONCHOSCOPY, DIAGNOSTIC- inspection Bronch;  Surgeon: Kamala Lovell MD;  [facility]: [facility]    BRONCHOSCOPY (RIGID OR FLEXIBLE), DIAGNOSTIC N/A 09/13/2022    Procedure: INSPECTION BRONCHOSCOPY, WITH BRONCHOALVEOLAR LAVAGE;  Surgeon: Jose R Mccullough MD;  [facility]    BRONCHOSCOPY (RIGID OR FLEXIBLE), DIAGNOSTIC N/A 11/09/2022    Procedure: BRONCHOSCOPY, WITH BRONCHOALVEOLAR LAVAGE AND BIOPSY;  Surgeon: Cesar Lima MD;  [facility]    BRONCHOSCOPY (RIGID OR FLEXIBLE), DIAGNOSTIC N/A 01/25/2023    Procedure: BRONCHOSCOPY, WITH BRONCHOALVEOLAR LAVAGE AND BIOPSY;  Surgeon: Mason Reddy MD;  [facility]    BRONCHOSCOPY (RIGID OR FLEXIBLE), DIAGNOSTIC N/A 04/19/2023    Procedure: BRONCHOSCOPY, WITH BRONCHOALVEOLAR LAVAGE AND BIOPSY;  Surgeon: Kamala Lovell MD;  [facility]    BRONCHOSCOPY (RIGID OR FLEXIBLE), DIAGNOSTIC N/A 07/12/2023    Procedure: BRONCHOSCOPY, WITH BRONCHOALVEOLAR LAVAGE AND BIOPSY;  Surgeon: Cesar Lima MD;  [facility]    BRONCHOSCOPY FLEXIBLE AND RIGID N/A 07/19/2022    Procedure: BRONCHOSCOPY inspection only;  Surgeon: Bob Liao MD;  [facility]    COLONOSCOPY  2015    CORONARY ANGIOGRAPHY ADULT ORDER      CV CORONARY ANGIOGRAM N/A 06/30/2021    Procedure: CV CORONARY ANGIOGRAM;  Surgeon: Alexander Cuellar MD;  Location:  [facility] CARDIAC CATH LAB    CV RIGHT HEART CATH MEASUREMENTS RECORDED N/A 06/30/2021    Procedure: CV RIGHT HEART CATH;  Surgeon: Alexander Cuellra MD;  Location:  HEART CARDIAC CATH LAB    ENDOSCOPIC PERORAL MYOTOMY N/A 10/09/2023    Procedure: MYOTOMY, ESOPHAGUS, ENDOSCOPIC, ORAL APPROACH;  Surgeon: Gonzalez Navarro MD;  [facility]    ENDOSCOPIC RETROGRADE CHOLANGIOPANCREATOGRAM N/A 08/11/2022    Procedure: ENDOSCOPIC RETROGRADE CHOLANGIOPANCREATOGRAPHY WITH PANCREATIC DUCT NEEDLE KNIFE AND STENT  PLACEMENT, BILE DUCT SPHINCTEROTOMY, BLOOD/DEBRIS REMOVAL AND STENT PLACEMENT;  Surgeon: Cosmo Arroyo MD;  [facility]    ENDOSCOPIC RETROGRADE CHOLANGIOPANCREATOGRAM N/A 10/07/2022    Procedure: ENDOSCOPIC RETROGRADE CHOLANGIOPANCREATOGRAPHY with biliary and pancreatic stent removal, debris removal;  Surgeon: Cosmo Arroyo MD;  [facility]    ENT SURGERY  1974    tonsillectomy    ENTEROSCOPY SMALL BOWEL N/A 08/11/2022    Procedure: SMALL BOWEL ENTEROSCOPY;  Surgeon: Cosmo Arroyo MD;  [facility]    ESOPHAGOGASTRODUODENOSCOPY, WITH NASOGASTRIC TUBE INSERTION N/A 07/01/2022    Procedure: ESOPHAGOGASTRODUODENOSCOPY, WITH NASOJEJUNAL TUBE INSERTION;  Surgeon: Ozzy Nickerson MD;  [facility]    ESOPHAGOSCOPY, GASTROSCOPY, DUODENOSCOPY (EGD), COMBINED N/A 08/03/2022    Procedure: ESOPHAGOGASTRODUODENOSCOPY (EGD);  Surgeon: Ira Andres MD;  [facility]    ESOPHAGOSCOPY, GASTROSCOPY, DUODENOSCOPY (EGD), COMBINED N/A 01/25/2023    Procedure: ESOPHAGOGASTRODUODENOSCOPY (EGD) with botox injection;  Surgeon: Gonzalez Navarro MD;  [facility]    ESOPHAGOSCOPY, GASTROSCOPY, DUODENOSCOPY (EGD), COMBINED N/A 10/09/2023    Procedure: ESOPHAGOGASTRODUODENOSCOPY;  Surgeon: Gonzalez Navarro MD;  [facility]    HAND SURGERY      INSERT CHEST TUBE Right 09/13/2022    Procedure: Insert chest tube;  Surgeon: Jose R Mccullough MD;  [facility]    IR CVC TUNNEL PLACEMENT > 5 YRS OF AGE  09/26/2022    IR CVC TUNNEL PLACEMENT > 5 YRS OF AGE  02/14/2024    IR CVC TUNNEL REMOVAL RIGHT  3/6/2024    IR GASTRO JEJUNOSTOMY TUBE CHANGE  08/31/2022    IR GASTRO JEJUNOSTOMY TUBE CHANGE  12/21/2022    IR GASTRO JEJUNOSTOMY TUBE CHANGE  07/12/2023    IR GASTRO JEJUNOSTOMY TUBE CHANGE  08/18/2023    IR GASTRO JEJUNOSTOMY TUBE CHANGE  11/14/2023    IR GASTRO JEJUNOSTOMY TUBE PLACEMENT  07/27/2022    IR THORACENTESIS  08/29/2022    LEEP TX, CERVICAL  04/07/2017    HECTOR III     LYMPH NODE BIOPSY Left 2005    Left axilla, benign- [place]    MIDLINE INSERTION - DOUBLE LUMEN Left 07/28/2022    20cm, Basilic vein    PICC DOUBLE LUMEN PLACEMENT Right 03/04/2024    5FR DL PICc, basiliv vein- L-36cm, 1cm out.    REPLACE GASTROJEJUNOSTOMY TUBE, PERCUTANEOUS  10/07/2022    Procedure: Replace Gastrojejunostomy Tube;  Surgeon: Cosmo Arroyo MD;  [facility]    THORACENTESIS Left 08/29/2022    Procedure: THORACENTESIS;  Surgeon: Bo Capone PA-C;  Location: [facility]    THORACENTESIS Left 09/13/2022    Procedure: Thoracentesis;  Surgeon: Jose R Mccullough MD;  [facility]    THROMBECTOMY UPPER EXTREMITY Left 07/02/2022    Procedure: LEFT RADIAL ARM THROMBECTOMY;  Surgeon: Christie Graham MD;  [facility]    TRANSPLANT LUNG RECIPIENT SINGLE X2 Bilateral 06/28/2022    Procedure: Clamshell Incision, Bilateral Sequential Lung Transplant, On Cardiopulmonary Bypass, Flexible Bronchoscopy;  Surgeon: Sue Sunshine MD;  [facility]       Current Facility-Administered Medications   Medication    acetaminophen (TYLENOL) tablet 975 mg    acetylcysteine (MUCOMYST) 10 % nebulizer solution 4 mL    albuterol (PROVENTIL) neb solution 2.5 mg    amiodarone (NEXTERONE) 6 mg/mL in sodium chloride 0.9% in non-PVC container 250 mL MAX concentration CENTRAL line infusion    calcium carbonate suspension 1,250 mg    calcium carbonate-vitamin D (CALTRATE) 600-10 MG-MCG per tablet 1 tablet    carboxymethylcellulose PF (REFRESH PLUS) 0.5 % ophthalmic solution 1 drop    cyanocobalamin (VITAMIN B-12) tablet 500 mcg    cycloSPORINE modified (GENERIC EQUIVALENT) microemulsion solution 200 mg    dextrose 10% infusion    glucose gel 15-30 g    Or    dextrose 50 % injection 25-50 mL    Or    glucagon injection 1 mg    diphenhydrAMINE (BENADRYL) capsule 50 mg    Or    diphenhydrAMINE (BENADRYL) injection 50 mg    fidaxomicin (DIFICID) tablet 200 mg    heparin ANTICOAGULANT injection 5,000  Units    heparin lock flush 10 UNIT/ML injection 5-20 mL    heparin lock flush 10 UNIT/ML injection 5-20 mL    hydrOXYzine HCl (ATARAX) tablet 25 mg    insulin aspart (NovoLOG) injection (RAPID ACTING)    levalbuterol (XOPENEX) neb solution 1.25 mg    levothyroxine (SYNTHROID/LEVOTHROID) tablet 25 mcg    Lidocaine (LIDOCARE) 4 % Patch 1 patch    lidocaine (LMX4) cream    lidocaine 1 % 0.1-1 mL    lidocaine-prilocaine (EMLA) cream    liothyronine (CYTOMEL) half-tab 2.5 mcg    LORazepam (ATIVAN) injection 0.25 mg    metoprolol tartrate (LOPRESSOR) tablet 25 mg    naloxone (NARCAN) injection 0.2 mg    Or    naloxone (NARCAN) injection 0.4 mg    Or    naloxone (NARCAN) injection 0.2 mg    Or    naloxone (NARCAN) injection 0.4 mg    No lozenges or gum should be given while patient on BIPAP/AVAPS/AVAPS AE    OLANZapine zydis (zyPREXA) ODT tab 5 mg    ondansetron (ZOFRAN ODT) ODT tab 4 mg    Or    ondansetron (ZOFRAN) injection 4 mg    pantoprazole (PROTONIX) 2 mg/mL suspension 40 mg    Patient may continue current oral medications    predniSONE (DELTASONE) tablet 5 mg    And    predniSONE (DELTASONE) tablet 2.5 mg    prochlorperazine (COMPAZINE) injection 10 mg    Or    prochlorperazine (COMPAZINE) tablet 10 mg    Or    prochlorperazine (COMPAZINE) suppository 25 mg    senna-docusate (SENOKOT-S/PERICOLACE) 8.6-50 MG per tablet 1 tablet    Or    senna-docusate (SENOKOT-S/PERICOLACE) 8.6-50 MG per tablet 2 tablet    [Held by provider] sevelamer carbonate (RENVELA) Packet 0.8 g    sodium bicarbonate tablet 1,300 mg    sodium chloride (NEBUSAL) 3 % neb solution 3 mL    sodium chloride (PF) 0.9% PF flush 10 mL    sodium chloride (PF) 0.9% PF flush 10-20 mL    sodium chloride (PF) 0.9% PF flush 10-20 mL    sodium chloride (PF) 0.9% PF flush 10-40 mL    sodium chloride (PF) 0.9% PF flush 3 mL    sodium chloride 0.9 % bag TABLE SOLN    sulfamethoxazole-trimethoprim (BACTRIM) 400-80 MG per tablet 1 tablet         Psychiatric  History:  Sofie denied history of depression, doesn't consider herself anxious apart from current experience with BiPAP. History of drug use - marijuana and methamphetamine and participated in chemical dependency treatment multiple times. Has avoided use for years.     Social History:  Sofie lives in [place]. She has three children and seven grandchildren and cites them as her main motivation for taking care of her health. She feels supported by family. Said she was feeling down and more family have been visiting. She has been  1 time,  since 1995. 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 Highest level of completed education was high school.     Mental Status/Interview:  Appearance/Behavior/Orientation: Alert and oriented to person, place, time, and situation. No evidence of psychomotor agitation.    Cooperation/Reliability: Patient appeared to honestly respond to questions about psychosocial functioning and is deemed a reliable historian.   Cognition/Memory/Judgment: Not formally assessed, yet no difficulties apparent upon interview. Fund of knowledge consistent with age, level of education, and life experience. Abstract reasoning appropriate, no difficulties with judgment apparent.  Speech/Language: Speech was clear, logical and coherent, of normal rate, rhythm and volume.   Thought Content/Form: Appropriate to interview and situation. Overall logical and organized. Patient denied any difficulties with a thought disorder although noted some hallucinatory experiences at night based on chart review. Denied any history of obsessive-compulsive disorder or of rodney.   Mood/Affect: Mood fair; affect was mood congruent.    Appetite: Did not assess.  Insight/Motivation: Appropriate to situation.   Suicide/Assault: Patient denies suicidal or assaultive ideation, plan, or intent.     Impression:  Sofie denies history of anxiety/depression and other mental  health symptoms, has a history of methamphetamine use disorder in remission. She has been experiencing increased anxiety and a sense of claustrophobia with using BiPAP. With medication and behavioral interventions she is reporting improvements in her ability to tolerate wearing the device. We discussed additional skills to which she was amenable.     Diagnosis:  Anxiety secondary to medical condition    Recommendation/Plan:  Remind Sofie of 5-4-3-2-1 grounding exercise - List 5 things she can see, 4 things she can hear, and 3 things she can feel. She can use this when on the BiPAP or preparing to use it  We will likely not follow-up but please re-consult should Sofie express interest      Visit start time: 4:00  Visit end time: 4:35       Jane Espinal, PhD,   Clinical Health Psychologist  Phone: [phone]  Pager: [phone]      *In accordance with the Rules of the [place] [employer], it is noted that psychological descriptions and scientific procedures underlying psychological evaluations have limitations.  Absolute predictions cannot be made based on information in this report.

## 2024-03-21 ENCOUNTER — APPOINTMENT (OUTPATIENT)
Dept: OCCUPATIONAL THERAPY | Facility: CLINIC | Age: 64
DRG: 640 | End: 2024-03-21
Attending: INTERNAL MEDICINE
Payer: MEDICARE

## 2024-03-21 ENCOUNTER — APPOINTMENT (OUTPATIENT)
Dept: PHYSICAL THERAPY | Facility: CLINIC | Age: 64
DRG: 640 | End: 2024-03-21
Attending: INTERNAL MEDICINE
Payer: MEDICARE

## 2024-03-21 LAB
ALBUMIN SERPL BCG-MCNC: 3.8 G/DL (ref 3.5–5.2)
ALP SERPL-CCNC: 137 U/L (ref 40–150)
ALT SERPL W P-5'-P-CCNC: 11 U/L (ref 0–50)
ANION GAP SERPL CALCULATED.3IONS-SCNC: 11 MMOL/L (ref 7–15)
AST SERPL W P-5'-P-CCNC: 17 U/L (ref 0–45)
BASOPHILS # BLD AUTO: ABNORMAL 10*3/UL
BASOPHILS # BLD MANUAL: 0.1 10E3/UL (ref 0–0.2)
BASOPHILS NFR BLD AUTO: ABNORMAL %
BASOPHILS NFR BLD MANUAL: 1 %
BILIRUB SERPL-MCNC: 0.3 MG/DL
BUN SERPL-MCNC: 56.4 MG/DL (ref 8–23)
CALCIUM SERPL-MCNC: 9 MG/DL (ref 8.8–10.2)
CHLORIDE SERPL-SCNC: 94 MMOL/L (ref 98–107)
CREAT SERPL-MCNC: 3.37 MG/DL (ref 0.51–0.95)
CYCLOSPORINE BLD LC/MS/MS-MCNC: 171 UG/L (ref 50–400)
DEPRECATED HCO3 PLAS-SCNC: 30 MMOL/L (ref 22–29)
EGFRCR SERPLBLD CKD-EPI 2021: 15 ML/MIN/1.73M2
EOSINOPHIL # BLD AUTO: ABNORMAL 10*3/UL
EOSINOPHIL # BLD MANUAL: 0.1 10E3/UL (ref 0–0.7)
EOSINOPHIL NFR BLD AUTO: ABNORMAL %
EOSINOPHIL NFR BLD MANUAL: 1 %
ERYTHROCYTE [DISTWIDTH] IN BLOOD BY AUTOMATED COUNT: 17.4 % (ref 10–15)
GLUCOSE BLDC GLUCOMTR-MCNC: 107 MG/DL (ref 70–99)
GLUCOSE BLDC GLUCOMTR-MCNC: 116 MG/DL (ref 70–99)
GLUCOSE BLDC GLUCOMTR-MCNC: 116 MG/DL (ref 70–99)
GLUCOSE BLDC GLUCOMTR-MCNC: 122 MG/DL (ref 70–99)
GLUCOSE BLDC GLUCOMTR-MCNC: 131 MG/DL (ref 70–99)
GLUCOSE SERPL-MCNC: 125 MG/DL (ref 70–99)
HCT VFR BLD AUTO: 33.7 % (ref 35–47)
HGB BLD-MCNC: 10.2 G/DL (ref 11.7–15.7)
IMM GRANULOCYTES # BLD: ABNORMAL 10*3/UL
IMM GRANULOCYTES NFR BLD: ABNORMAL %
INR PPP: 0.98 (ref 0.85–1.15)
LYMPHOCYTES # BLD AUTO: ABNORMAL 10*3/UL
LYMPHOCYTES # BLD MANUAL: 0.6 10E3/UL (ref 0.8–5.3)
LYMPHOCYTES NFR BLD AUTO: ABNORMAL %
LYMPHOCYTES NFR BLD MANUAL: 6 %
MAGNESIUM SERPL-MCNC: 2.8 MG/DL (ref 1.7–2.3)
MCH RBC QN AUTO: 36.4 PG (ref 26.5–33)
MCHC RBC AUTO-ENTMCNC: 30.3 G/DL (ref 31.5–36.5)
MCV RBC AUTO: 120 FL (ref 78–100)
MONOCYTES # BLD AUTO: ABNORMAL 10*3/UL
MONOCYTES # BLD MANUAL: 0.5 10E3/UL (ref 0–1.3)
MONOCYTES NFR BLD AUTO: ABNORMAL %
MONOCYTES NFR BLD MANUAL: 5 %
MYELOCYTES # BLD MANUAL: 0.2 10E3/UL
MYELOCYTES NFR BLD MANUAL: 2 %
NEUTROPHILS # BLD AUTO: ABNORMAL 10*3/UL
NEUTROPHILS # BLD MANUAL: 8.2 10E3/UL (ref 1.6–8.3)
NEUTROPHILS NFR BLD AUTO: ABNORMAL %
NEUTROPHILS NFR BLD MANUAL: 85 %
NRBC # BLD AUTO: 0 10E3/UL
NRBC BLD AUTO-RTO: 0 /100
PHOSPHATE SERPL-MCNC: 5.8 MG/DL (ref 2.5–4.5)
PLAT MORPH BLD: ABNORMAL
PLATELET # BLD AUTO: 191 10E3/UL (ref 150–450)
POLYCHROMASIA BLD QL SMEAR: SLIGHT
POTASSIUM SERPL-SCNC: 3.9 MMOL/L (ref 3.4–5.3)
PROT SERPL-MCNC: 5.6 G/DL (ref 6.4–8.3)
RBC # BLD AUTO: 2.8 10E6/UL (ref 3.8–5.2)
RBC MORPH BLD: ABNORMAL
SODIUM SERPL-SCNC: 135 MMOL/L (ref 135–145)
STOMATOCYTES BLD QL SMEAR: SLIGHT
TME LAST DOSE: NORMAL H
TME LAST DOSE: NORMAL H
WBC # BLD AUTO: 9.6 10E3/UL (ref 4–11)

## 2024-03-21 PROCEDURE — 99233 SBSQ HOSP IP/OBS HIGH 50: CPT | Mod: GC | Performed by: INTERNAL MEDICINE

## 2024-03-21 PROCEDURE — 85610 PROTHROMBIN TIME: CPT | Performed by: STUDENT IN AN ORGANIZED HEALTH CARE EDUCATION/TRAINING PROGRAM

## 2024-03-21 PROCEDURE — 250N000013 HC RX MED GY IP 250 OP 250 PS 637

## 2024-03-21 PROCEDURE — 85007 BL SMEAR W/DIFF WBC COUNT: CPT

## 2024-03-21 PROCEDURE — 84100 ASSAY OF PHOSPHORUS: CPT | Performed by: STUDENT IN AN ORGANIZED HEALTH CARE EDUCATION/TRAINING PROGRAM

## 2024-03-21 PROCEDURE — 99233 SBSQ HOSP IP/OBS HIGH 50: CPT | Performed by: INTERNAL MEDICINE

## 2024-03-21 PROCEDURE — 94640 AIRWAY INHALATION TREATMENT: CPT

## 2024-03-21 PROCEDURE — 250N000012 HC RX MED GY IP 250 OP 636 PS 637: Performed by: INTERNAL MEDICINE

## 2024-03-21 PROCEDURE — 97530 THERAPEUTIC ACTIVITIES: CPT | Mod: GP

## 2024-03-21 PROCEDURE — 93010 ELECTROCARDIOGRAM REPORT: CPT | Performed by: INTERNAL MEDICINE

## 2024-03-21 PROCEDURE — 90935 HEMODIALYSIS ONE EVALUATION: CPT | Performed by: STUDENT IN AN ORGANIZED HEALTH CARE EDUCATION/TRAINING PROGRAM

## 2024-03-21 PROCEDURE — 93005 ELECTROCARDIOGRAM TRACING: CPT

## 2024-03-21 PROCEDURE — 250N000009 HC RX 250

## 2024-03-21 PROCEDURE — 94799 UNLISTED PULMONARY SVC/PX: CPT

## 2024-03-21 PROCEDURE — 90935 HEMODIALYSIS ONE EVALUATION: CPT

## 2024-03-21 PROCEDURE — 250N000009 HC RX 250: Performed by: STUDENT IN AN ORGANIZED HEALTH CARE EDUCATION/TRAINING PROGRAM

## 2024-03-21 PROCEDURE — 250N000012 HC RX MED GY IP 250 OP 636 PS 637

## 2024-03-21 PROCEDURE — 250N000013 HC RX MED GY IP 250 OP 250 PS 637: Performed by: STUDENT IN AN ORGANIZED HEALTH CARE EDUCATION/TRAINING PROGRAM

## 2024-03-21 PROCEDURE — 634N000001 HC RX 634: Mod: JZ | Performed by: INTERNAL MEDICINE

## 2024-03-21 PROCEDURE — 258N000003 HC RX IP 258 OP 636: Performed by: INTERNAL MEDICINE

## 2024-03-21 PROCEDURE — 97116 GAIT TRAINING THERAPY: CPT | Mod: GP

## 2024-03-21 PROCEDURE — 83735 ASSAY OF MAGNESIUM: CPT | Performed by: STUDENT IN AN ORGANIZED HEALTH CARE EDUCATION/TRAINING PROGRAM

## 2024-03-21 PROCEDURE — 80158 DRUG ASSAY CYCLOSPORINE: CPT | Performed by: NURSE PRACTITIONER

## 2024-03-21 PROCEDURE — 94660 CPAP INITIATION&MGMT: CPT

## 2024-03-21 PROCEDURE — 94640 AIRWAY INHALATION TREATMENT: CPT | Mod: 76

## 2024-03-21 PROCEDURE — 97110 THERAPEUTIC EXERCISES: CPT | Mod: GO

## 2024-03-21 PROCEDURE — 97535 SELF CARE MNGMENT TRAINING: CPT | Mod: GO

## 2024-03-21 PROCEDURE — 120N000003 HC R&B IMCU UMMC

## 2024-03-21 PROCEDURE — 250N000011 HC RX IP 250 OP 636

## 2024-03-21 PROCEDURE — 999N000157 HC STATISTIC RCP TIME EA 10 MIN

## 2024-03-21 PROCEDURE — 90937 HEMODIALYSIS REPEATED EVAL: CPT

## 2024-03-21 PROCEDURE — 80053 COMPREHEN METABOLIC PANEL: CPT

## 2024-03-21 PROCEDURE — 85027 COMPLETE CBC AUTOMATED: CPT

## 2024-03-21 PROCEDURE — 250N000013 HC RX MED GY IP 250 OP 250 PS 637: Performed by: INTERNAL MEDICINE

## 2024-03-21 RX ORDER — AMIODARONE HYDROCHLORIDE 200 MG/1
400 TABLET ORAL 2 TIMES DAILY
Status: DISCONTINUED | OUTPATIENT
Start: 2024-03-21 | End: 2024-03-21

## 2024-03-21 RX ORDER — CYCLOSPORINE 100 MG/ML
150 SOLUTION ORAL EVERY EVENING
Status: DISCONTINUED | OUTPATIENT
Start: 2024-03-21 | End: 2024-03-23

## 2024-03-21 RX ORDER — ACETYLCYSTEINE 100 MG/ML
2 SOLUTION ORAL; RESPIRATORY (INHALATION)
Status: DISCONTINUED | OUTPATIENT
Start: 2024-03-21 | End: 2024-04-04

## 2024-03-21 RX ORDER — CYCLOSPORINE 100 MG/ML
200 SOLUTION ORAL EVERY MORNING
Status: DISCONTINUED | OUTPATIENT
Start: 2024-03-22 | End: 2024-03-26

## 2024-03-21 RX ORDER — AMIODARONE HYDROCHLORIDE 200 MG/1
400 TABLET ORAL 2 TIMES DAILY
Status: DISCONTINUED | OUTPATIENT
Start: 2024-03-21 | End: 2024-03-28

## 2024-03-21 RX ADMIN — EPOETIN ALFA-EPBX 8000 UNITS: 10000 INJECTION, SOLUTION INTRAVENOUS; SUBCUTANEOUS at 11:19

## 2024-03-21 RX ADMIN — SODIUM BICARBONATE 650 MG TABLET 1300 MG: at 21:31

## 2024-03-21 RX ADMIN — CALCIUM CARBONATE 600 MG (1,500 MG)-VITAMIN D3 400 UNIT TABLET 1 TABLET: at 19:01

## 2024-03-21 RX ADMIN — AMIODARONE HYDROCHLORIDE 400 MG: 200 TABLET ORAL at 12:46

## 2024-03-21 RX ADMIN — AMIODARONE HYDROCHLORIDE 400 MG: 200 TABLET ORAL at 21:31

## 2024-03-21 RX ADMIN — ACETYLCYSTEINE 4 ML: 100 INHALANT RESPIRATORY (INHALATION) at 10:07

## 2024-03-21 RX ADMIN — LEVALBUTEROL HYDROCHLORIDE 1.25 MG: 1.25 SOLUTION RESPIRATORY (INHALATION) at 10:07

## 2024-03-21 RX ADMIN — ACETYLCYSTEINE 2 ML: 100 SOLUTION ORAL; RESPIRATORY (INHALATION) at 20:52

## 2024-03-21 RX ADMIN — CYANOCOBALAMIN TAB 500 MCG 500 MCG: 500 TAB at 08:17

## 2024-03-21 RX ADMIN — APIXABAN 2.5 MG: 2.5 TABLET, FILM COATED ORAL at 21:31

## 2024-03-21 RX ADMIN — Medication 40 MG: at 08:19

## 2024-03-21 RX ADMIN — Medication 2.5 MCG: at 08:16

## 2024-03-21 RX ADMIN — LIDOCAINE: 40 CREAM TOPICAL at 06:24

## 2024-03-21 RX ADMIN — FIDAXOMICIN 200 MG: 200 TABLET, FILM COATED ORAL at 08:18

## 2024-03-21 RX ADMIN — SODIUM BICARBONATE 650 MG TABLET 1300 MG: at 08:16

## 2024-03-21 RX ADMIN — SODIUM BICARBONATE 650 MG TABLET 1300 MG: at 15:09

## 2024-03-21 RX ADMIN — PREDNISONE 5 MG: 5 TABLET ORAL at 08:17

## 2024-03-21 RX ADMIN — LEVALBUTEROL HYDROCHLORIDE 1.25 MG: 1.25 SOLUTION RESPIRATORY (INHALATION) at 20:52

## 2024-03-21 RX ADMIN — CYCLOSPORINE 200 MG: 100 SOLUTION ORAL at 08:18

## 2024-03-21 RX ADMIN — CALCIUM CARBONATE 600 MG (1,500 MG)-VITAMIN D3 400 UNIT TABLET 1 TABLET: at 08:17

## 2024-03-21 RX ADMIN — METOPROLOL TARTRATE 25 MG: 25 TABLET, FILM COATED ORAL at 21:31

## 2024-03-21 RX ADMIN — CALCIUM CARBONATE 600 MG (1,500 MG)-VITAMIN D3 400 UNIT TABLET 1 TABLET: at 12:45

## 2024-03-21 RX ADMIN — PREDNISONE 2.5 MG: 2.5 TABLET ORAL at 21:31

## 2024-03-21 RX ADMIN — SODIUM CHLORIDE 250 ML: 9 INJECTION, SOLUTION INTRAVENOUS at 10:15

## 2024-03-21 RX ADMIN — ACETYLCYSTEINE 2 ML: 100 SOLUTION ORAL; RESPIRATORY (INHALATION) at 17:13

## 2024-03-21 RX ADMIN — LEVALBUTEROL HYDROCHLORIDE 1.25 MG: 1.25 SOLUTION RESPIRATORY (INHALATION) at 17:13

## 2024-03-21 RX ADMIN — HEPARIN SODIUM 5000 UNITS: 5000 INJECTION, SOLUTION INTRAVENOUS; SUBCUTANEOUS at 06:24

## 2024-03-21 RX ADMIN — LEVOTHYROXINE SODIUM 25 MCG: 0.03 TABLET ORAL at 06:24

## 2024-03-21 RX ADMIN — METOPROLOL TARTRATE 25 MG: 25 TABLET, FILM COATED ORAL at 08:17

## 2024-03-21 RX ADMIN — LIDOCAINE 4% 1 PATCH: 40 PATCH TOPICAL at 21:33

## 2024-03-21 RX ADMIN — SODIUM CHLORIDE 300 ML: 9 INJECTION, SOLUTION INTRAVENOUS at 10:15

## 2024-03-21 RX ADMIN — FIDAXOMICIN 200 MG: 200 TABLET, FILM COATED ORAL at 21:30

## 2024-03-21 RX ADMIN — Medication: at 10:15

## 2024-03-21 RX ADMIN — Medication 2.5 MCG: at 21:28

## 2024-03-21 RX ADMIN — OLANZAPINE 5 MG: 5 TABLET, ORALLY DISINTEGRATING ORAL at 22:19

## 2024-03-21 ASSESSMENT — ACTIVITIES OF DAILY LIVING (ADL)
ADLS_ACUITY_SCORE: 36
ADLS_ACUITY_SCORE: 38
ADLS_ACUITY_SCORE: 36
ADLS_ACUITY_SCORE: 38
ADLS_ACUITY_SCORE: 36
ADLS_ACUITY_SCORE: 36

## 2024-03-21 NOTE — PLAN OF CARE
HEMODIALYSIS TREATMENT NOTE    Date: 3/21/2024  Time: 12:17 PM    Data:  Pre Wt: 53.1 kg (117 lb 1 oz)   Desired Wt:   kg   Post Wt: 52.1 kg (bed)  Weight change: 1 kg  Ultrafiltration - Post Run Net Total Removed (mL): 1000 mL  Vascular Access Status: Graft  patent  Dialyzer Rinse: Streaked, Light  Total Blood Volume Processed: 67.64 L   Total Dialysis (Treatment) Time: 3   Dialysate Bath: K 3, Ca 2.25  Heparin: None    Lab:   None    Interventions:  -LAVG cannulation and decannulation (15 g needles x2)  -Monitor patient's vitals and status pre/ intra tx  -Meds: See MAR  -Handoff post tx    Assessment:  -Generally alert and oriented, slept on/off during tx, atrial flutter throughout treatment with prolonged QtC. MD notified and EKG ordered. No changes to medication changes per MD. Goal reduced to 1L by MD after reassessment. Amiodarone stopped and restarted several times during treatment to maintain SBP>90. UF pump turned on/off as necessary. See flowsheet and MAR for additional information related to treatment and vitals. Enteral feeding resumed at 0930.  Able to maintain / DFR throughout treatment. Sites held for 10 minutes decannulation. New dressings are CDI (okay to be removed an hour post tx). Brief handoff provided to ICU RN post tx.      Plan:    Per renal team.

## 2024-03-21 NOTE — PLAN OF CARE
Goal Outcome Evaluation:      Plan of Care Reviewed With: patient    Overall Patient Progress: no changeOverall Patient Progress: no change    Outcome Evaluation: see 3/21 RD note for updated assessment

## 2024-03-21 NOTE — PROGRESS NOTES
Nephrology Progress Note  03/21/2024         Assessment & Recommendations:   Sofie Rodriguez is a 63 year old year old female with ESKD, COPD s/p b/l lung transplant in 6/2022 with multiple complications, gastroparesis s/p GJ tube, GIB, chronic diarrhea, recurrent c-diff, FTT with inability to tolerate any tube feeds, R hip fx s/p ORIF 12/2023, admitted on 2/10 for initiation of TPN/lipids, transferred to ICU on 2/17 with worsening mental status and respiratory acidosis in spite of bipap, ultimately intubated on 2/18, being treated for PNA, also with volume overload in setting of high volume TPN. Persistent respiratory acidosis in spite of volume off, transferred to ICU for hypotension and respiratory failure, improved on bipap, now floor status again 3/1. Transferred to ICU 3/18 again with worsening hypercapnic respiratory failure.    # ESKD - TTS, LUE AVG, 3hr, 45.5kg (will be lowered), [facility], Dr. Andres Fairbanks. She has been losing weight and now even below her recent set EDW.  - With TPN condensed to 640 ml max per day, we can manage volume with HD 3x/week  - Continue HD on TTS schedule   - Requires EMLA cream an hour before HD  - please avoid PICC which will compromise future dialysis accesses; a midline is much preferred for this patient         # Dialysis access: LUE AVG placed 3-4 months ago, per pt. She had arm swelling and a fistulogram a couple months ago and swelling improved but now has redeveloped.  - US with c/f possible steal syndrome  - per vascular surgery, no concern for steal syndrome, ok to go ahead with fistulogram  - given that AVF is working well on dialysis (450 BFR) and at this time IR is only able to perform fistulograms when AVF isn't working well, will defer to OP for management.     # Persistent respiratory acidosis: CO2 improved on bipap overnight  - difficulty tolerating bipap due to claustrophobia, but wearing this lately  - balancing act between giving bicarb to  "maintain pH in setting of severe persistent respiratory acidosis with bicarb quickly converting CO2 and worsening overall status  - per primary, was started on bicarb 1300 mg tid  - transplant pulm following    # Aflutter: on amio and BB  # Volume/BP: Anuric; on metoprolol soln 25 mg bid   - weights are variable, unsure of accuracy; appears close to euvolemia with much improvement in LE edema  - CXR 3/18 with worsening pulm edema, had extra run on Monday for more fluid off  - UF goal 1-2L today         # Nutrition: on [employer] tube feeds        # Anemia 2/2 ESKD  On Venofer 50 qwk, Mircera last dose 1/9/2024  - hgb 9-10's  - Will continue Venofer 50 mcg q week (Tuesday)  - continue epogen 8000 units via HD    # BMD:   - Ca 8-9's, phos 5.8, alb 3.8  - sevelamer on hold, will monitor and restart if phos remain elevated      Recommendations were communicated to primary team via note     RABIA Moctezuma   Division of Renal Disease and Hypertension  [phone]      Interval History :   Seen on dialysis, attempting 1-2L, appears close to euvolemia. CO2 improved on bipap overnight. No n/v, CP, chills    Review of Systems:   4 point ROS neg other than as noted above    Physical Exam:   I/O last 3 completed shifts:  In: 1373.41 [I.V.:183.41; NG/GT:350]  Out: -    BP 98/62   Pulse 92   Temp 97.6  F (36.4  C) (Oral)   Resp 20   Ht 1.57 m (5' 1.81\")   Wt 48.4 kg (106 lb 11.2 oz)   SpO2 100%   BMI 19.64 kg/m       GENERAL APPEARANCE: NAD  PULM: diminished  CV: RRR    trace pedal/ankle  GI: soft   INTEGUMENT: no cyanosis, no rashes on exposed skin  NEURO: a/o3  Access Left AVG     Labs:   All labs reviewed by me  Electrolytes/Renal -   Recent Labs   Lab Test 03/21/24  0926 03/21/24  0443 03/21/24  0442 03/20/24  1629 03/20/24  1257 03/20/24  0354 03/20/24  0225 03/19/24  0342 03/19/24  0218   NA  --  135  --   --   --   --  138  --  138   POTASSIUM  --  3.9  --   --  3.6  --  3.4  --  4.8   CHLORIDE  --  94*  --   -- "   --   --  96*  --  99   CO2  --  30*  --   --   --   --  30*  --  25   BUN  --  56.4*  --   --   --   --  29.4*  --  67.0*   CR  --  3.37*  --   --   --   --  2.13*  --  3.60*   * 125* 122*   < >  --    < > 113*   < > 114*   ESTUARDO  --  9.0  --   --   --   --  8.9  --  8.9   MAG  --  2.8*  --   --  2.8*  --  1.6*  --  2.0   PHOS  --  5.8*  --   --  4.5  --   --   --  5.9*    < > = values in this interval not displayed.       CBC -   Recent Labs   Lab Test 03/21/24 0443 03/20/24 0225 03/19/24  0021   WBC 9.6 6.1 8.4   HGB 10.2* 8.6* 9.8*    156 170       LFTs -   Recent Labs   Lab Test 03/21/24 0443 03/20/24 0225 03/19/24  0218   ALKPHOS 137 115 131   BILITOTAL 0.3 0.4 0.4   ALT 11 10 12   AST 17 17 24   PROTTOTAL 5.6* 5.8* 5.9*   ALBUMIN 3.8 4.0 3.7       Iron Panel -   Recent Labs   Lab Test 09/26/22  0555 09/03/22  1039 08/24/22  0810   IRON 54 21* 41   IRONSAT 22 9* 21   CARLOS 769* 343* 334*         Imaging:  All imaging studies reviewed by me.     Current Medications:   acetylcysteine  4 mL Nebulization 4x daily    calcium carbonate-vitamin D  1 tablet Per J Tube TID w/meals    cyanocobalamin  500 mcg Per Feeding Tube Daily    cycloSPORINE modified  200 mg Per G Tube BID IS    epoetin tre-epbx  8,000 Units Intravenous Once in dialysis/CRRT    fidaxomicin  200 mg Oral BID    heparin ANTICOAGULANT  5,000 Units Subcutaneous Q12H    heparin lock flush  5-20 mL Intracatheter Q24H    insulin aspart  1-4 Units Subcutaneous Q4H    levalbuterol  1.25 mg Nebulization 4x Daily    levothyroxine  25 mcg Oral QAM AC    lidocaine  1 patch Transdermal Q24h    liothyronine  2.5 mcg Oral BID    metoprolol tartrate  25 mg Per J Tube BID    OLANZapine zydis  5 mg Oral At Bedtime    pantoprazole  40 mg Per J Tube BID    predniSONE  5 mg Per J Tube QAM    And    predniSONE  2.5 mg Per J Tube QPM    [Held by provider] sevelamer carbonate (RENVELA)  0.8 g Oral BID    sodium bicarbonate  1,300 mg Oral or Feeding Tube TID     sodium chloride (PF)  10 mL Intracatheter Q8H    sodium chloride (PF)  10-40 mL Intracatheter Q8H    sulfamethoxazole-trimethoprim  1 tablet Oral Q Mon Wed Fri AM      amiodarone 0.5 mg/min (03/21/24 0900)    dextrose      - MEDICATION INSTRUCTIONS -      - MEDICATION INSTRUCTIONS -      sodium chloride 0.9%      - MEDICATION INSTRUCTIONS -       RABIA Moctezuma

## 2024-03-21 NOTE — PROGRESS NOTES
"CLINICAL NUTRITION SERVICES - REASSESSMENT NOTE     Nutrition Prescription    RECOMMENDATIONS FOR MDs/PROVIDERS TO ORDER:  none    Malnutrition Status:    Severe malnutrition in the context of chronic illness    Recommendations already ordered by Registered Dietitian (RD):  - Continue EN as ordered: KF Renal Support @ 35 mL/hr x 22 hours/day (held for 1 hour before/after Synthroid) - meeting 100% low end needs.  Likely exceeding needs with addition of PO diet, however pt desires weight restoration    Future/Additional Recommendations:  - Monitor PO intake / EN tolerance / changes to EDW  - Follow disposition planning, anticipate ongoing need for EN     EVALUATION OF THE PROGRESS TOWARD GOALS   Diet: Regular diet (resumed 3/20) <-- NPO while on BIPAP.    Nutrition Support:  ENTERAL (PEGJ tube)   3/14-___: KF Renal Support @ 35 mL/hr x 22 hours/day (held for 1 hour before/after Synthroid) = 770 mL formula, 1386 Kcals, 62 gm pro, 131 gm CHO, 15 gm fiber, 516 mL free water daily (100% low end kcal / protein goals)  PARENTERAL  12 hour cycle discontinued 3/16: 60 g AA, 160 g Dextrose, 250 ml SMOF 5 x per week    Intake: PN + EN daily average intake over past three days: 1383 kcals (30 kcal/kg), 66 g protein (1.4 g/kg) for 100% estimated needs.  PO intake not quanitified     NEW FINDINGS   Visited with patient and observed 100% \"brunch\" tray consumed (omelet), pt ordered a late breakfast after HD.  Multiple snacks (primarily sweets and non nutrient dense snacks) at bedside.  Pt eating pop tarts at time of visit.  Reports tolerating EN well. Desires weight restoration to 115 - 120 lbs (55 kg).    Historically does not tolerate EN rates >30 ml/hr, has trialed multiple formulas and refused EN / been noncompliant in the past    Weight: 52.1 kg post HD today.  Continue current dosing weight of 46 kg (EDW)     Labs:   BUN 56.4 (H)  Cr 3.37 (H)  K+ 3.9 (WNL)  Phos 5.8 (H) - intermittent mild elevation  Copper 56.3 (L) 3/16  Zn " 48.3 (L) 3/5  CRP 25.3 (H) - copper and zinc levels may be artificially low in the setting of inflammation    Meds: reviewed and notable for calcium carbonate vit D, B12 500 mcg daily, cyclosporin, low resistance novolog, synthroid, sevelamer (held per nephrology)    GI: 0 to 8 BM's daily over past week - loose liquid per RN.  History of gastroparesis, N/V/D, intolerance to EN.  Recurrent Cdiff (+).  GI signed off - plan for further work up as outpatient pending course.    Skin: skin tear LUE, R Neck incision    Renal: HD    Respiratory:  hx of lung transplant 2022, prior intubation (extubated 2/19).  HFNC vs BIPAP.  Pt not a candidate for metabolic cart study d/t supplemental O2 requirement    GOC discussions ongoing    MALNUTRITION  % Intake: Decreased intake does not meet criteria - with EN, PN (now discontinued) and PO.  % Weight Loss: None noted since admit, >20% in 1 year previously (Severe)  Subcutaneous Fat Loss: Upper arm:  severe, Lower arm:  severe, and Thoracic/intercostal:  moderate  Muscle Loss: Thoracic region (clavicle, acromium bone, deltoid, trapezius, pectoral):  moderate, Upper arm (bicep, tricep):  severe, Lower arm  (forearm):  severe, Dorsal hand:  moderate, and Posterior calf:  moderate  Fluid Accumulation/Edema: Does not meet criteria  Malnutrition Diagnosis: Severe malnutrition in the context of chronic illness    Previous Goals   Total avg nutritional intake to meet a minimum of 25 kcal/kg and 1.2 g PRO/kg daily (per dosing wt 45.5 kg EDW )   Evaluation: Met    Previous Nutrition Diagnosis  Malnutrition related to altered GI as evidenced by severe wt loss and FTT prior to admit, reliant on TPN support since admit to provide for maintenance nutrition needs while PO for comfort, now with plan to re-trial enteral nutrition support to reassess for tolerance and ability to wean off TPN support    Evaluation: Improving    CURRENT NUTRITION DIAGNOSIS  Malnutrition related to history of EN  intolerance as evidenced by prior severe weight loss with severe fat and muscle wasting      INTERVENTIONS  Implementation  Enteral Nutrition - continue current regimen    Goals  Total avg nutritional intake (TF + PO) to meet a minimum of 35 kcal/kg and 1.2 g PRO/kg daily (per dosing wt 46 kg), with goal of weight restoration to 55kg.    Monitoring/Evaluation  Progress toward goals will be monitored and evaluated per protocol.    Amanda Horne, RD, LD, CNSC  Available on [employer] - can search by name or 4E Dietitian  **Clinical Nutrition is no longer available via pager

## 2024-03-21 NOTE — PLAN OF CARE
D/I: Pt alert and oriented. Sats stable between, nasal cannula, high flow and BiPAP at night. HR afib/aflutter, blood pressure stable. Went to dialysis this am. Tube feeds at goal. Tolerating regular diet. Several loose stools. Anuric. Transfer orders in chart. Report called to  nurse. Follow up concerns identified.   A: Pt transferred to , ready for the next level of care.  P: Continue to progress towards rehab. Update MD with concerns.

## 2024-03-21 NOTE — PROGRESS NOTES
Pulmonary Medicine  Cystic Fibrosis - Lung Transplant Team  Progress Note  2024     Patient: Sofie Rodriguez  MRN: [id number]  : 1960 (age 63 year old)  Transplant: 2022 (Lung), POD#[id number]  Admission date: 2/10/2024    Assessment & Plan:     Sofie Rodriguez is a 63 year old female with h/o COPD s/p BSLT () with course complicated by post-operative hemidiaphragm palsy, recurrent PNAs, positive DSA, EBV viremia, hypogammaglobulinemia, severe gastroparesis s/p G/J tube placement (22) and pyloric botox (23), GI bleed /2 pyloric ulcer, hemobilia s/p ERCP and MRCP, chronic diarrhea, recurrent C diff colitis, ESRD on iHD, recurrent falls with injuries (recent right hip fx s/p ORIF 2023), deconditioning, and FTT.  Admitted 2/10 for failure to thrive and initiation of TPN/lipids.  Emergent intubation - for hypoxic and hypercapneic respiratory failure with severe respiratory acidosis and encephalopathy.  Transient improvements in recurrent acute on chronic hypercapnia respiratory failure with increased iHD, infectious workup unremarkable.  Returned to ICU - and again 3/18 d/t tenuous respiratory status complicated by variable daytime NIPPV tolerance. Now transferred out of the ICU on 3/20.      Today's recommendations:  - AVAPS at night and during the daytime as needed,with naps, anxiolytics PRN, VBG per primary team  - Sodium bicarbonate was initiated on 3/19  - Repeat CSA level likely mildly elevated, dose reduced and will repeat trend level on 3/23 with steady state on 3/25 (ordered)   - Repeat DSA due , Repeat Prospera pending  - Tentative plan for care conference for medical update and long term planning per primary      Acute on chronic hypoxic/hypercapneic respiratory failure:  S/p bilateral sequential lung transplant for COPD:   Hypoventilation, Suspected CARLEE: CT () with decreased MARLON opacities but new tree in bud RLL opacities.  PFTs unchanged in clinic (but  ATS criteria not met), remain significantly below her baseline.  Baseline hypoxia with 2L NC overnight PTA.  S/p intubation 2/17-2/19 for respiratory decompensation with encephalopathy and severe respiratory acidosis, CT with new patchy consolidative and nodular opacities, GGO primarily in the bases and intralobular septal thickening (concerning for pulmonary edema given recent addition of TPN nutrition).  Bronch (2/18) with very friable tissue, cutlures only with C. glabrata.  Persistent respiratory failure also complicated by hypoventilation and deconditioning d/t malnutrition.  Returned to ICU 2/28-2/29, intubation deferred given improvement with continuous NIPPV with minimal interruptions (sodium bicarb also stopped, likely partially contributing).  Neurology consulted 2/27, MRI brain (3/1) without acute intracranial pathology.  SNIFF (3/8) normal.  Metabolic CART suggested overfeeding on TPN.  NIF and FVC continue to downtrend (3/5-3/15) prompting concern for potential neuromuscular cause.  Continues with refractory severe hypercapneic respiratory failure and recurrent intolerance of extended time off NIPPV.  Returned to ICU again 3/18, respiratory panel negative, CXR with increased pulmonary edema but unable to pull enough volume with iHD (weight up 5.2 kg 3/14 to 3/19) given hypotension.  Overall, her PCO2 retention is likely multifactorial with a component being from overfeeding (though remedied with nutrition adjustment), metabolic compensation barrier d/t renal failure, pulmonary edema, bicarb loss with diarrhea, hypoventilation with declining FVC concerning for neuromuscular etiology (though neurology consult was not revealing), and NIPPV intolerance due to claustrophobia.  Increased adherence to BiPAP with subsequent improvement in VBG.    - AVAPS NOC and with naps during the daytime, anxiolytics PRN, VBG per primary team  - Xopenex QID, Mucomyst QID and Volera QID (3/19 per primary)  - Continue to defer  ABX but low threshold to initiate with leukocytosis or fever       Immunosuppression:  AZA previously stopped 5/2023 d/t low ImmuKnow assay and EBV viremia.  ImmuKnow (2/27) remained low at 88.  Transitioned from [place] to CSA 3/11.  -  mg BID.  Goal 140-170.  Repeat level 3/21 likely mildly supratherapeutic, dose reduced to 150 mg Qpm and 200 mg Qam. Trend level ordered for 3/23 and steady state on 3/25.   - Prednisone 5 mg qAM / 2.5 mg qPM     Prophylaxis:   - Bactrim qMWF for PJP ppx (3/13, prior dapsone through 3/11)  - CMV ppx not currently indicated, D+/R+, CMV negative 3/18     Positive DSA: [facility] treatment deferred given frailty and stable PFTs.  DSA DQB2 stable to decreased on 3/6 with 5667 mfi.  Most recent cell-free DNA Prospera (2/18) mildly elevated at 1.04 (concerning for possible rejection), which was increased from prior level of 0.12 (1/10).  IST increase deferred at that time per Dr. Gong.  S/p IVIG (2/27) for DSA (IgG WNL).  - Repeat DSA due 4/6  - Repeat Prospera (3/18) pending      EBV viremia: CT CAP (2/7) without lymphadenopathy.  Most recent level (2/18) improved to 28k from 96k (2/7).  Repeat EBV (3/18) 23k.     Other relevant problems being managed by the primary team:      FTT:  Severe protein calorie malnutrition:  Gastroparesis s/p PEG/J, botox, and G-POEM:  SB hypomotility:  Pyloric ulcer:  Chronic nausea and osmotic diarrhea:  SIBO s/p rifaximin:   Recurrent C diff colitis: Chronic osmotic and infectious diarrhea since transplant with recurrent episodes of C diff.  Notable weight loss (40# in a year) d/t diarrhea, GI dysmotility, and intolerance of enteral feeds (PEG/J tube in place), most recently on elemental formula.  Extensive OP eval and f/u with GI.  S/p port placement for TPN and lipids (continued for ~5 weeks inpatient).  C diff positive (3/13), on fidaxomicin.     ESRD: CT with volume overload secondary to TPN volume, iHD increased from PTA TThSa schedule with unsustained  improvement.  Management per nephrology, dialysis via [facility] CVC.  Working towards reinitiating regular 3x/week schedule.      Goals of care: Care conference held with palliative and primary team on 3/15 for medical update with pt. and daughters.  Comfort cares discussed but pt. declining at this time.  Palliative following (our team discussed at length with palliative provider 3/19).    - Tentative plan for care conference for medical update and long term planning in coming days     We appreciate the excellent care provided by the MICU team.  Recommendations communicated via this note.  Will continue to follow along closely, please do not hesitate to call with any questions or concerns.    Susan Miller MD  Pulmonary, Allergy, Critical Care, and Sleep Medicine   [facility]   Pager: [phone]       Subjective & Interval History:     Currently feeling well. Breathing feels comfortable, no significant coughing. Tolerated AVAPs overnight and discussed the need for this nightly. Does feel better after using it. Eating ok. Currently tolerating TFs at 35 ml/hr. Diarrhea has improved.     Review of Systems:     C: No fever, no chills  INTEGUMENTARY/SKIN: No rash or obvious new lesions  ENT/MOUTH: No nasal congestion or drainage  RESP: See interval history  CV: No chest pain, no palpitations, no peripheral edema, no orthopnea  GI: No nausea, no vomiting, + frequent loose stools, no reflux symptoms  : Anuric  MUSCULOSKELETAL: No myalgias, no arthralgias  ENDOCRINE: Blood sugars with adequate control  NEURO: + Occ headache  PSYCHIATRIC: Mood is stable     Physical Exam:     All notes, images, and labs from past 24 hours (at minimum) were reviewed.    Vital signs:  Temp: 97.9  F (36.6  C) Temp src: Oral BP: 106/69 Pulse: 96   Resp: 20 SpO2: 100 % O2 Device: Nasal cannula Oxygen Delivery: 8 LPM   Weight: 48.4 kg (106 lb 11.2 oz)  I/O:     Intake/Output Summary (Last 24 hours) at 3/21/2024 1532  Last data filed at  3/21/2024 1154  Gross per 24 hour   Intake 1007.91 ml   Output 1000 ml   Net 7.91 ml         Constitutional: Lying supine in bed, in no apparent distress.   HEENT: Eyes with pink conjunctivae, anicteric.   PULM: Diminished air flow bilaterally in bases.  No crackles, no rhonchi, no wheezes.  Non-labored breathing.   CV: Normal S1 and S2.  Tachycardia.  + murmur.  No peripheral edema.  LUE AV fistula connected to HD.   ABD: NABS, soft, nontender, nondistended.  PEG/J site not visualized.  MSK: Moves all extremities.  + muscle wasting.   NEURO: Awake, alert, conversant.   SKIN: Warm, dry, fragile, scattered ecchymosis.   PSYCH: Mood stable.     Data:     LABS    CMP:   Recent Labs   Lab 03/21/24  0926 03/21/24  0443 03/21/24  0442 03/20/24  2311 03/20/24  1629 03/20/24  1257 03/20/24  0354 03/20/24  0225 03/19/24  0342 03/19/24  0218 03/18/24  1950 03/18/24  1721 03/18/24  1400 03/18/24  0651   NA  --  135  --   --   --   --   --  138  --  138  --  139   < > 138   POTASSIUM  --  3.9  --   --   --  3.6  --  3.4  --  4.8  --  4.3   < > 5.4*   CHLORIDE  --  94*  --   --   --   --   --  96*  --  99  --  99   < > 101   CO2  --  30*  --   --   --   --   --  30*  --  25  --  27   < > 21*   ANIONGAP  --  11  --   --   --   --   --  12  --  14  --  13   < > 16*   * 125* 122* 180*   < >  --    < > 113*   < > 114*   < > 153*   < > 108*   BUN  --  56.4*  --   --   --   --   --  29.4*  --  67.0*  --  58.2*   < > 88.2*   CR  --  3.37*  --   --   --   --   --  2.13*  --  3.60*  --  3.12*   < > 4.26*   GFRESTIMATED  --  15*  --   --   --   --   --  26*  --  14*  --  16*   < > 11*   ESTUARDO  --  9.0  --   --   --   --   --  8.9  --  8.9  --  8.8   < > 9.3   MAG  --  2.8*  --   --   --  2.8*  --  1.6*  --  2.0  --  2.0  --  2.4*   PHOS  --  5.8*  --   --   --  4.5  --   --   --  5.9*  --  4.8*  --  6.7*   PROTTOTAL  --  5.6*  --   --   --   --   --  5.8*  --  5.9*  --   --   --  6.1*   ALBUMIN  --  3.8  --   --   --   --   --  4.0   "--  3.7  --   --   --  3.6   BILITOTAL  --  0.3  --   --   --   --   --  0.4  --  0.4  --   --   --  0.3   ALKPHOS  --  137  --   --   --   --   --  115  --  131  --   --   --  158*   AST  --  17  --   --   --   --   --  17 --  24  --   --   --  19   ALT  --  11  --   --   --   --   --  10  --  12  --   --   --  <5    < > = values in this interval not displayed.     CBC:   Recent Labs   Lab 03/21/24  0443 03/20/24  0225 03/19/24  0021 03/18/24  1721   WBC 9.6 6.1 8.4 7.5   RBC 2.80* 2.45* 2.69* 2.66*   HGB 10.2* 8.6* 9.8* 9.5*   HCT 33.7* 29.5* 33.3* 32.9*   * 120* 124* 124*   MCH 36.4* 35.1* 36.4* 35.7*   MCHC 30.3* 29.2* 29.4* 28.9*   RDW 17.4* 17.2* 18.2* 18.4*    156 170 175       INR:   Recent Labs   Lab 03/18/24  0651   INR 1.10       Glucose:   Recent Labs   Lab 03/21/24  0926 03/21/24  0443 03/21/24  0442 03/20/24  2311 03/20/24 2045 03/20/24  1629   * 125* 122* 180* 106* 104*       Blood Gas:   Recent Labs   Lab 03/20/24  1257 03/20/24  0225 03/19/24  2144   PHV 7.27* 7.26* 7.27*   PCO2V 74* 77* 78*   PO2V 46 51* 51*   HCO3V 33* 35* 36*   LINDY 4.8* 6.1* 6.9*   O2PER 35 30 33       Culture Data No results for input(s): \"CULT\" in the last 168 hours.    Virology Data:   Lab Results   Component Value Date    FLUAH1 Not Detected 03/18/2024    FLUAH3 Not Detected 03/18/2024    [id number] Not Detected 03/18/2024    IFLUB Not Detected 03/18/2024    RSVA Not Detected 03/18/2024    RSVB Not Detected 03/18/2024    PIV1 Not Detected 03/18/2024    PIV2 Not Detected 03/18/2024    PIV3 Not Detected 03/18/2024    HMPV Not Detected 03/18/2024       Historical CMV results (last 3 of prior testing):  Lab Results   Component Value Date    CMVQNT Not Detected 03/18/2024    CMVQNT Not Detected 02/19/2024    CMVQNT Not Detected 02/07/2024     Lab Results   Component Value Date    CMVLOG 3.2 07/12/2023    CMVLOG <2.1 04/19/2023    CMVLOG 3.5 01/25/2023       Urine Studies    Recent Labs   Lab Test 02/18/24  0222 " 05/18/23  0627   URINEPH 7.5* 5.0   NITRITE Negative Negative   LEUKEST Trace* Moderate*   WBCU 66* 21*       Most Recent [place]ze Pulmonary Function Testing (FVC/FEV1 only)  FVC-Pre   Date Value Ref Range Status   02/07/2024 1.19 L    01/10/2024 1.12 L    08/29/2023 1.48 L    07/25/2023 1.55 L      FVC-%Pred-Pre   Date Value Ref Range Status   02/07/2024 42 %    01/10/2024 39 %    08/29/2023 53 %    07/25/2023 55 %      FEV1-Pre   Date Value Ref Range Status   02/07/2024 1.13 L    01/10/2024 1.10 L    08/29/2023 1.43 L    07/25/2023 1.54 L      FEV1-%Pred-Pre   Date Value Ref Range Status   02/07/2024 51 %    01/10/2024 49 %    08/29/2023 64 %    07/25/2023 69 %        IMAGING    Recent Results (from the past 48 hour(s))   CT Chest w/o Contrast    Narrative    EXAMINATION: CT CHEST W/O CONTRAST, 3/18/2024 7:16 PM    CLINICAL HISTORY: acute on chronic hypercapnic respiratory failure of  unclear etiology, s/p BSLT 2022    COMPARISON: Radiograph earlier same day. CT 3/7/2024..    TECHNIQUE: CT imaging obtained through the chest without contrast.  Coronal, sagittal and axial MIP reformatted images obtained and  reviewed. Noncontrast exam.    FINDINGS:    Lines, tubes, devices: Right upper extremity PICC tip terminates at  the superior cavoatrial junction.    Lungs: Changes of bilateral lung transplantation. Mucous plugging in  the airways of the right lower lobe. There is right greater than left  lower lobe and lingular consolidation with surrounding diffuse  groundglass opacities. Smooth interlobular septal thickening. The  central tracheobronchial tree is patent. No areas of bronchiectasis or  architectural distortion. No pleural effusion, pulmonary  consolidation, or pneumothorax. Mild bibasilar atelectasis.     Mediastinum: The visualized thyroid is unremarkable.Heart size is  enlarged. No pericardial effusion. The thoracic aorta and main  pulmonary artery are within normal limits. Standard branching pattern  of the  great vessels. No abnormal thoracic lymph nodes by size  criteria although difficult to evaluate on noncontrast imaging with  limited fat  structures. Numerous prominent mediastinal  lymph nodes. Patulous esophagus.    Bones and soft tissues: No suspicious osseous lesion.    Upper abdomen:  Limited evaluation of the upper abdomen. No acute  pathology of the visualized solid organs. Moderate atherosclerotic  calcifications of the abdominal aorta. Several small nonobstructing  left renal stones measuring up to 3 mm.    Diffuse body wall edema.      Impression    IMPRESSION:   Bibasilar consolidation, smooth interlobular septal, and diffuse  groundglass opacities. Given mucous plugging in the airways of the  right lower lobe concerning for aspiration pneumonia which may be  superimposed on pulmonary edema.    I have personally reviewed the examination and initial interpretation  and I agree with the findings.    YANNICK BENAVIDEZ MD         SYSTEM ID:  [id number]

## 2024-03-21 NOTE — CONSULTS
SPIRITUAL HEALTH SERVICES Consult Note  [facility] 4E    I attempted visit with Sofie per routine consult.  Sofie needed to use the bathroom at this time so she declined. I will attempt visit again tomorrow.     Coretta Melgoza  Chaplain Resident  Pager [phone]    * [facility] remains available 24/7 for emergent requests/referrals, either by having the switchboard page the on-call  or by entering an ASAP/STAT consult in Epic (this will also page the on-call ). Routine Epic consults receive an initial response within 24 hours.*

## 2024-03-21 NOTE — PROGRESS NOTES
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 On 2/17/24 she was transferred to ICU for worsening mental status and acute hypoxic and hypercarbic respiratory failure inspite of BiPAP requiring intubation. She was suspected to have PNA and started on antibiotics. Extubated on 2/19 without complications. Transitioned back to PO/TF from TPN. Developed progressively worsening respiratory acidosis and hypercarbia, and was re-admitted to ICU on 2/28/24 for close monitoring of intermittent BiPAP and possible intubation, however she improved on AVAPS setting and well enough to transfer back to medicine floor on 2/29/24. Transferred back to ICU 3/18 for recurrent hypercarbia with significant respiratory acidosis. Bicarb supplementation started, transferred to the floor 3/20. Goals include A. Flutter control and BiPAP adherence with control of anxiety/claustrophobia. GoC conversations ongoing.       Changes Today 03/21/2024  -apixaban 2.5BID for afib/aflutter; stop sub Q heparin  -Stop amiodarone gtt; start  amio PO 400mg BID  -EKG w/ QTc 475  -Dialysis  today  -Fidaxomicin  to end tomorrow  -Continue BiPAP as tolerated w/ anxiety meds    #Severe respiratory acidosis, improved on BiPAP  #Acute on chronic hypoxic<hypercarbic respiratory failure  #S/P Lung transplant 2022 c/b right hemidiaphragm palsy  #Recurrent pneumonia, resolved  #Mucous plugging  #Suspected CARLEE  Patient received a bilateral lung transplant 6/28/22 for COPD. Was admitted 2/10 to address malnutrition and admitted to ICU on 2/17 with hypoxic hypercarbic respiratory failure. Intubated on 2/18 AM  due to worsening oxygen requirements, likely due to pulmonary edema given hx of ESRD requiring HD vs infection given hx of lung transplant, immunosuppressed status, and recurrent pneumonias. Extubated on 2/19 without complications. Has had issues with rising pCO2 that is responsive to BiPAP, but due to refusal, has required transferred to ICU (on 2/28 AM). Neurology consulted and MRI r/o central cause problem for respiratory drive. Started on AVAPS with improvement in mental status and VBG, and patient transferred back to medicine floor on 2/29.  Required another brief ICU stay 3/18 - 3/20, but did not require intubation during this time. Issues with anxiety/claustrophobia while using the mask, Atarax has helped though Zyprexa led to hallucinations.   - VBGs after ICU transfer with pH mid 7.2s and pCO2 mid 70s.   - Oral bicarb 1300mg TID + high bicarb dialysis bath  - Continue BiPAP 16/5 with longer breaks on HFNC   - PRN hypertonic saline inhaler with albuterol nebs  - Continue secretion clearance: Xopenex + Mucomyst, acapella and CPT   - Low threshold for ABx  - Transplant pulmonology following, recs appreciated  - PTA immunosuppressive agent  >Prednisone 5 mg every morning, 2.5 mg every afternoon; Stop tacrolimus 3/10 and replaced with Cyclosporin 200mg BID   > overnight oximetry study suggestive of O2 vs CPAP need, as did require up to 2LPM overnight to prevent hypoxia  > Try to minimize O2 to preserve  respiratory drive, will give IVIG for DSA+   - Stopped PTA dapsone MWF for PJP prophylaxis; replaced with Bactrim (3/11)    - Limit medications that would depress respiration, ok to trial low dose benzo  - D/c'd theophylline 3/3/24 due to difficulty attaining therapeutic levels (started by ICU), could consider Modafinil   - repeat metabolic CART study when stable on enteral feeds, off TPN (per [facility])  - sleep clinic eval at discharge     Antibiotics:  Vancomycin (2/17 - 2/17)  Zosyn (2/17 - 2/23) for HAP  Bactrim MWF, PJP ppx (previously on Dapsone)  Micafungin (2/18- 2/21)  Fidaxomicin (3/13-3/22)     #A-fib, A-flutter (recurrent)  Noted atrial fibrillation on arrival with HR elevated at 150, not sustained for > 10 minutes and otherwise hemodynamically stable. RVR resolved w/o medications.  PTA metoprolol (25mg BID) has been held through much of admission. On 3/17 new Aflutter developed overnight. HR 150s with flutter waves on EKG. Metoprolol administered along with Mg infusion with minimal response. Patient was then given amiodarone bolus and placed on drip which slightly lowered her HR in 120-130s. This was held for borderline hypotension 3/18, but restarted again with amiodarone 3/19.   - MAP goal > 65 mmHg  - Metoprolol tartrate dose 25 mg BID  - s/p Amio bolus and 24 hours infusion (stopped 3/21); Start amiodarone 400mg BID PO  - Continuous telemetry  - Recheck K+     #Severe malnutrition   #FTT   #Hypoalbuminemia  #Gastroparesis, small bowel dysmotility  #S/P PEG/J with intolerance of enteral nutrition  #Chronic osmotic diarrhea/SIBO s/p Rifaximin  #Recurrent C.Diff (3rd ep)    #GERD  Patient with gastroparesis (presumed due to vagal injury) and small bowel dysmotility complicated by unintentional 40lb weight loss over the past year and now severe malnutrition. Previously intolerant to oral food intake due to nausea. Was initially admitted for portacath and TPN initiation since 2/13. After extubation has  been able to tolerate feeds without n/v from 2/20.  Per GI, next steps for workup for malnutrition would include CT enterography to evaluate for anatomic abnormalities contributing to malnutrition vs possible treatment for SIBO (though patient has had multiple courses of treatment in the past with no long term improvement). Patient couldn't tolerate the oral load for CT enterography on 2/21, so will defer this until she is able to tolerate greater PO load. On 2/23 , again discussed with patient the need of small bowel motility study if not improving outpatient through [place]. No ongoing concerns for SIBO on 2/23 as patient is passing multiple episodes of stools and gas with no abdominal pain or distention. C-diff test negative on 2/21. Per RD, patient tolerating >300 kcal of intake PO currently, so stopped trickle Tube feeds and continuing with PO and TPN for nutrition (sufficient for preventing gut atrophy). As of 3/11 transplant pulmonology is worried that TPN is not a realistic plan to continue at home and would like to transition back to TF (RD oncerned pt is not absorbing any oral intake). Since transitioned off TPN. Transplant pulm aslo worried about mucosal toxicity. Repeat enteric studies showed recurrent C.diff;  Fidaxomicin x 10 days started (GI approved). Transplant pulm requesting repeat colonoscopy w/ Bx after completion of c.diff treatment, to r/o toxicity or other reason that diarrhea is present.  - regular diet + Restarted tube feeds (with goal of 35 mL/hr) if doing well off BiPAP  - GI consulted/signed off; appreciate recs              -PO Fidaxomicin 200 mg BID for 10 days- diarrhea has improved since start of tx               -May reconsult GI after tx of C.diff for future colonoscopy if diarrhea returns   - May benefit from small bowel motility study if not improving outpatient through [place].   - PPI BID  - PRN bowel regimen     #B/L Neuropathic Pain   New pain b/l over the past few days. Last A1c  <4.2 (7/11/23). End stage renal dz can cause it too, TSH wnl. B12 elevated. B6 normal. Copper low (56.3 (), zinc 48.3 (mildly low).   -Consider gabapentin in the coming days   -Neuro Recs:  -No obvious clinical history or exam findings to suggest neurologic/neuromuscular causes of respiratory weakness  -Neuropathy labs currently pending  -Consider stopping B12     #Acute on chronic anemia 2/2 ESRD  Hgb stable 7-8s, with no acute signs of bleeding. Aute drop 2/29 from 8.2 > 6.6 after two rechecks, no overt signs of bleeding; required 1U pRBC transfusion.  Stable.   - continue epogen per nephrology with dialysis  - venofer 50 mcg qweek with dialysis     #ESRD on HD  #Hypervolemic hyponatremia, resolved  #Anion gap metabolic acidosis, resolved  #Mild hyperphosphatemia  Patient is ESRD on T/Th/Sat HD as outpt, Hgb stabilizing. HD tolerating well. Briefly required extra Monday runs, not since being off TPN. She would prefer longer sessions to more days.   - CBC and CMP daily  - Strict I/Os  - Daily weight   - Increased bicarb bath     # Hypothyroidism, possibly transient   Patient with new low T4 at 0.64 and TSH at 6.5, concerning for new hypothyroidism vs sick thyroid syndrome. TSH with appropriate response, more consistent with elevation in the setting of acute illness. ICU team on 2/28 recommended initiation of treatment for possible hypothyroid as this can improve respiratory muscle function in the setting of weakness and malnutrition.   - continue liothyronine and levothyroxine per ICU team recommendations     #Left upper extremity unilateral edema, improving   #Bilateral pedal and ankle edema, improving  Edema due to third spacing due to hypoalbuminemia vs HF. BNP >55892 at admission, Echo on 2/18 shows EF of 55-60% with collapsible IVC. Extremity edema 2/2 to hypoalbuminemia. Upper limb duplex USG on 2/18 negative for DVT.  USG left arm on 2/21 shows steel physiology and Vascular Surgery consulted (see  below). Overall edema in extremities have improved significantly  - Continue daily weights  - Strict I/Os  - Elevation and wrapping of only lower legs as needed and increased UF per nephrology for volume management; no wrapping of Left upper extremity with dialysis fistula  - HD as noted above      #Steal physiology of LUE dialysis access fistula  LUE arterial US obtained 2/21 with concern for LUE edema. US of fistula demonstrated steal physiology. Discussed with nephrology, and vascular surgery consulted on 2/22. Vascular surgery has only minor concerns for steal symptoms and given that the AVF is working well, they are okay with outpatient fistulograms/ venoplasty with wrist brachial index and PPG's, unless new concerns arise.  - plan for outpatient workup as above; nephrology in agreement with outpatient workup.     # Right hip fracture s/p ORIF (December 2023)   - PT/OT     # Hx EBV viremia   # Hypogammaglobulinemia  # Chronic immunosuppression 2/2 lung transplant  Patient has been afebrile and has not had leukocytosis however she is under immunosuppression. Given acute respiratory failure and hx of recurrent pneumonias, she was started on empiric abx for concerns over new pneumonia. RVP and cultures no growth to date.   - EBV 27K (decreased compared to prior)   - Did receive IVIG      #C  Care conference on 3/15 with Transplant Pulm, [facility], Medicine, daughters (Julia Nash) and Transplant SW. Overall medical updates provided and Qs answered. With declining respiratory acidosis on labs, discussed code status 3/18. Patient initially not desiring any escalation of her care to ICU/intubation with frustration re overall course. However, after discussing with her daughter on the phone she and family elected to remain full code and agreed to ICU admission and intubation if necessary. Still full code  - Transplant pulm requesting care conference this week; will schedule for early next week           Diet: Adult  Formula Drip Feeding: Continuous [employer] Renal Support; Jejunostomy; Goal Rate: 35 mL/hr (goal rate) held for 1 hour before/after Synthroid administration, per PharmD; mL/hr  Regular Diet Adult    DVT Prophylaxis: Heparin SQ  Dong Catheter: Not present  Fluids: TF, PO  Lines: PRESENT      PICC 03/04/24 Double Lumen Right Basilic TPN. PICC okay to use.-Site Assessment: WDL  Hemodialysis Vascular Access Arteriovenous graft Superior Arm-Site Assessment: Thrill present;Bruit present      Cardiac Monitoring: ACTIVE order. Indication: ICU  Code Status: Full Code      Clinically Significant Risk Factors            # Hypomagnesemia: Lowest Mg = 1.6 mg/dL in last 2 days, will replace as needed   # Hypoalbuminemia: Lowest albumin = 2.6 g/dL at 2/18/2024  5:13 AM, will monitor as appropriate             # Severe Malnutrition: based on nutrition assessment      # Financial/Environmental Concerns: none         Disposition Plan         The patient's care was discussed with the Attending Physician, Dr. Cameron  .     Betty Diaz, PGY4  Internal Medicine-Pediatrics  Pager: [phone]      Medicine Service, NILE TEAM 1  [facility]  Securely message with [employer] (more info)  Text page via [facility] Paging/Directory   See signed in provider for up to date coverage information  ______________________________________________________________________    Interval History   Sofie was seen at dialysis. No acute events overnight. Wore Bipap 16/5 at 35% and 3-4L NC    Physical Exam   Vital Signs: Temp: 97.4  F (36.3  C) Temp src: Oral BP: 100/58 Pulse: 105   Resp: (!) 55 SpO2: 97 % O2 Device: BiPAP/CPAP Oxygen Delivery: 30 LPM  Weight: 106 lbs 11.24 oz    General Appearance: Comfortable appearing while laying in bed on NC.  Frail and thin   Respiratory: No respiratory distress on NC. Distant respiratory sounds. No wheezing.  No cough.  Cardiovascular: Tachycardia with irregular rate.  No  JVD or peripheral edema.  No abnormal sounds. No LE edema  GI: Abdomen is firm but nondistended, nontympanitic, no peritoneal signs.  No focal areas of tenderness.  No palpable organomegaly.  Skin: Scattered ecchymoses and superficial wounds in various stages of healing on the bilateral arms.  Lines in place as above.  Other: Alert and oriented x 4.  Extremities spontaneously.  Grossly normal sensorium.  No obvious focal deficits.  Mood is good and affect congruent.  Word choice, behavior, eye contact all appropriate.    Medical Decision Making              Data     I have personally reviewed the following data over the past 24 hrs:    9.6  \   10.2 (L)   / 191     135 94 (L) 56.4 (H) /  125 (H)   3.9 30 (H) 3.37 (H) \     ALT: 11 AST: 17 AP: 137 TBILI: 0.3   ALB: 3.8 TOT PROTEIN: 5.6 (L) LIPASE: N/A     TSH: 1.31 T4: N/A A1C: N/A

## 2024-03-22 ENCOUNTER — APPOINTMENT (OUTPATIENT)
Dept: PHYSICAL THERAPY | Facility: CLINIC | Age: 64
DRG: 640 | End: 2024-03-22
Attending: INTERNAL MEDICINE
Payer: MEDICARE

## 2024-03-22 LAB
ALBUMIN SERPL BCG-MCNC: 3.6 G/DL (ref 3.5–5.2)
ALP SERPL-CCNC: 131 U/L (ref 40–150)
ALT SERPL W P-5'-P-CCNC: 9 U/L (ref 0–50)
ANION GAP SERPL CALCULATED.3IONS-SCNC: 10 MMOL/L (ref 7–15)
AST SERPL W P-5'-P-CCNC: 13 U/L (ref 0–45)
ATRIAL RATE - MUSE: 267 BPM
BASE EXCESS BLDV CALC-SCNC: 2.7 MMOL/L (ref -3–3)
BASE EXCESS BLDV CALC-SCNC: 3 MMOL/L (ref -3–3)
BASOPHILS # BLD AUTO: 0 10E3/UL (ref 0–0.2)
BASOPHILS NFR BLD AUTO: 0 %
BILIRUB SERPL-MCNC: 0.3 MG/DL
BUN SERPL-MCNC: 43.1 MG/DL (ref 8–23)
CALCIUM SERPL-MCNC: 8.9 MG/DL (ref 8.8–10.2)
CHLORIDE SERPL-SCNC: 97 MMOL/L (ref 98–107)
CREAT SERPL-MCNC: 2.81 MG/DL (ref 0.51–0.95)
DEPRECATED HCO3 PLAS-SCNC: 30 MMOL/L (ref 22–29)
DIASTOLIC BLOOD PRESSURE - MUSE: NORMAL MMHG
EGFRCR SERPLBLD CKD-EPI 2021: 18 ML/MIN/1.73M2
EOSINOPHIL # BLD AUTO: 0.2 10E3/UL (ref 0–0.7)
EOSINOPHIL NFR BLD AUTO: 2 %
ERYTHROCYTE [DISTWIDTH] IN BLOOD BY AUTOMATED COUNT: 17.3 % (ref 10–15)
GLUCOSE BLDC GLUCOMTR-MCNC: 125 MG/DL (ref 70–99)
GLUCOSE BLDC GLUCOMTR-MCNC: 126 MG/DL (ref 70–99)
GLUCOSE BLDC GLUCOMTR-MCNC: 130 MG/DL (ref 70–99)
GLUCOSE BLDC GLUCOMTR-MCNC: 157 MG/DL (ref 70–99)
GLUCOSE BLDC GLUCOMTR-MCNC: 182 MG/DL (ref 70–99)
GLUCOSE SERPL-MCNC: 136 MG/DL (ref 70–99)
HCO3 BLDV-SCNC: 33 MMOL/L (ref 21–28)
HCO3 BLDV-SCNC: 33 MMOL/L (ref 21–28)
HCT VFR BLD AUTO: 34.8 % (ref 35–47)
HGB BLD-MCNC: 10 G/DL (ref 11.7–15.7)
IMM GRANULOCYTES # BLD: 0.3 10E3/UL
IMM GRANULOCYTES NFR BLD: 3 %
INR PPP: 1.06 (ref 0.85–1.15)
INTERPRETATION ECG - MUSE: NORMAL
LYMPHOCYTES # BLD AUTO: 0.8 10E3/UL (ref 0.8–5.3)
LYMPHOCYTES NFR BLD AUTO: 9 %
MAGNESIUM SERPL-MCNC: 2.3 MG/DL (ref 1.7–2.3)
MCH RBC QN AUTO: 35.2 PG (ref 26.5–33)
MCHC RBC AUTO-ENTMCNC: 28.7 G/DL (ref 31.5–36.5)
MCV RBC AUTO: 123 FL (ref 78–100)
MONOCYTES # BLD AUTO: 0.7 10E3/UL (ref 0–1.3)
MONOCYTES NFR BLD AUTO: 8 %
NEUTROPHILS # BLD AUTO: 6.8 10E3/UL (ref 1.6–8.3)
NEUTROPHILS NFR BLD AUTO: 78 %
NRBC # BLD AUTO: 0 10E3/UL
NRBC BLD AUTO-RTO: 0 /100
O2/TOTAL GAS SETTING VFR VENT: 36 %
O2/TOTAL GAS SETTING VFR VENT: 40 %
OXYHGB MFR BLDV: 75 % (ref 70–75)
OXYHGB MFR BLDV: 81 % (ref 70–75)
P AXIS - MUSE: NORMAL DEGREES
PCO2 BLDV: 83 MM HG (ref 40–50)
PCO2 BLDV: 86 MM HG (ref 40–50)
PH BLDV: 7.19 [PH] (ref 7.32–7.43)
PH BLDV: 7.2 [PH] (ref 7.32–7.43)
PHOSPHATE SERPL-MCNC: 4.9 MG/DL (ref 2.5–4.5)
PLATELET # BLD AUTO: 180 10E3/UL (ref 150–450)
PO2 BLDV: 47 MM HG (ref 25–47)
PO2 BLDV: 53 MM HG (ref 25–47)
POTASSIUM SERPL-SCNC: 3.7 MMOL/L (ref 3.4–5.3)
PR INTERVAL - MUSE: NORMAL MS
PROT SERPL-MCNC: 5.6 G/DL (ref 6.4–8.3)
QRS DURATION - MUSE: 80 MS
QT - MUSE: 376 MS
QTC - MUSE: 475 MS
R AXIS - MUSE: 10 DEGREES
RBC # BLD AUTO: 2.84 10E6/UL (ref 3.8–5.2)
SAO2 % BLDV: 77.2 % (ref 70–75)
SAO2 % BLDV: 82.4 % (ref 70–75)
SODIUM SERPL-SCNC: 137 MMOL/L (ref 135–145)
SYSTOLIC BLOOD PRESSURE - MUSE: NORMAL MMHG
T AXIS - MUSE: 128 DEGREES
VENTRICULAR RATE- MUSE: 96 BPM
VIT B1 PYROPHOSHATE BLD-SCNC: 323 NMOL/L
WBC # BLD AUTO: 8.9 10E3/UL (ref 4–11)

## 2024-03-22 PROCEDURE — 85025 COMPLETE CBC W/AUTO DIFF WBC: CPT

## 2024-03-22 PROCEDURE — 999N000157 HC STATISTIC RCP TIME EA 10 MIN

## 2024-03-22 PROCEDURE — 83735 ASSAY OF MAGNESIUM: CPT | Performed by: STUDENT IN AN ORGANIZED HEALTH CARE EDUCATION/TRAINING PROGRAM

## 2024-03-22 PROCEDURE — 99233 SBSQ HOSP IP/OBS HIGH 50: CPT | Mod: GC | Performed by: INTERNAL MEDICINE

## 2024-03-22 PROCEDURE — 250N000013 HC RX MED GY IP 250 OP 250 PS 637

## 2024-03-22 PROCEDURE — 94640 AIRWAY INHALATION TREATMENT: CPT | Mod: 76

## 2024-03-22 PROCEDURE — 94799 UNLISTED PULMONARY SVC/PX: CPT

## 2024-03-22 PROCEDURE — 250N000011 HC RX IP 250 OP 636: Performed by: STUDENT IN AN ORGANIZED HEALTH CARE EDUCATION/TRAINING PROGRAM

## 2024-03-22 PROCEDURE — 99233 SBSQ HOSP IP/OBS HIGH 50: CPT | Performed by: STUDENT IN AN ORGANIZED HEALTH CARE EDUCATION/TRAINING PROGRAM

## 2024-03-22 PROCEDURE — 97116 GAIT TRAINING THERAPY: CPT | Mod: GP

## 2024-03-22 PROCEDURE — 97530 THERAPEUTIC ACTIVITIES: CPT | Mod: GP

## 2024-03-22 PROCEDURE — 80053 COMPREHEN METABOLIC PANEL: CPT

## 2024-03-22 PROCEDURE — 82805 BLOOD GASES W/O2 SATURATION: CPT

## 2024-03-22 PROCEDURE — 120N000003 HC R&B IMCU UMMC

## 2024-03-22 PROCEDURE — 94640 AIRWAY INHALATION TREATMENT: CPT

## 2024-03-22 PROCEDURE — 250N000011 HC RX IP 250 OP 636

## 2024-03-22 PROCEDURE — 250N000013 HC RX MED GY IP 250 OP 250 PS 637: Performed by: STUDENT IN AN ORGANIZED HEALTH CARE EDUCATION/TRAINING PROGRAM

## 2024-03-22 PROCEDURE — 250N000013 HC RX MED GY IP 250 OP 250 PS 637: Performed by: INTERNAL MEDICINE

## 2024-03-22 PROCEDURE — 84100 ASSAY OF PHOSPHORUS: CPT | Performed by: STUDENT IN AN ORGANIZED HEALTH CARE EDUCATION/TRAINING PROGRAM

## 2024-03-22 PROCEDURE — 85610 PROTHROMBIN TIME: CPT | Performed by: STUDENT IN AN ORGANIZED HEALTH CARE EDUCATION/TRAINING PROGRAM

## 2024-03-22 PROCEDURE — 250N000009 HC RX 250

## 2024-03-22 PROCEDURE — 250N000012 HC RX MED GY IP 250 OP 636 PS 637: Performed by: INTERNAL MEDICINE

## 2024-03-22 PROCEDURE — 94660 CPAP INITIATION&MGMT: CPT

## 2024-03-22 PROCEDURE — 250N000012 HC RX MED GY IP 250 OP 636 PS 637

## 2024-03-22 PROCEDURE — 99232 SBSQ HOSP IP/OBS MODERATE 35: CPT | Mod: FS | Performed by: PHYSICIAN ASSISTANT

## 2024-03-22 RX ADMIN — FIDAXOMICIN 200 MG: 200 TABLET, FILM COATED ORAL at 19:55

## 2024-03-22 RX ADMIN — FIDAXOMICIN 200 MG: 200 TABLET, FILM COATED ORAL at 07:54

## 2024-03-22 RX ADMIN — HYDROXYZINE HYDROCHLORIDE 25 MG: 25 TABLET, FILM COATED ORAL at 08:02

## 2024-03-22 RX ADMIN — CYCLOSPORINE 200 MG: 100 SOLUTION ORAL at 07:54

## 2024-03-22 RX ADMIN — ACETYLCYSTEINE 2 ML: 100 SOLUTION ORAL; RESPIRATORY (INHALATION) at 08:28

## 2024-03-22 RX ADMIN — APIXABAN 2.5 MG: 2.5 TABLET, FILM COATED ORAL at 19:55

## 2024-03-22 RX ADMIN — CYCLOSPORINE 150 MG: 100 SOLUTION ORAL at 19:59

## 2024-03-22 RX ADMIN — INSULIN ASPART 1 UNITS: 100 INJECTION, SOLUTION INTRAVENOUS; SUBCUTANEOUS at 15:58

## 2024-03-22 RX ADMIN — ACETYLCYSTEINE 2 ML: 100 SOLUTION ORAL; RESPIRATORY (INHALATION) at 16:24

## 2024-03-22 RX ADMIN — LIDOCAINE 4% 1 PATCH: 40 PATCH TOPICAL at 19:56

## 2024-03-22 RX ADMIN — Medication 40 MG: at 19:56

## 2024-03-22 RX ADMIN — METOPROLOL TARTRATE 25 MG: 25 TABLET, FILM COATED ORAL at 19:54

## 2024-03-22 RX ADMIN — AMIODARONE HYDROCHLORIDE 400 MG: 200 TABLET ORAL at 19:55

## 2024-03-22 RX ADMIN — PREDNISONE 2.5 MG: 2.5 TABLET ORAL at 19:55

## 2024-03-22 RX ADMIN — SODIUM BICARBONATE 650 MG TABLET 1300 MG: at 07:52

## 2024-03-22 RX ADMIN — CALCIUM CARBONATE 600 MG (1,500 MG)-VITAMIN D3 400 UNIT TABLET 1 TABLET: at 07:54

## 2024-03-22 RX ADMIN — Medication 2.5 MCG: at 19:55

## 2024-03-22 RX ADMIN — ACETYLCYSTEINE 2 ML: 100 SOLUTION ORAL; RESPIRATORY (INHALATION) at 12:53

## 2024-03-22 RX ADMIN — Medication 40 MG: at 00:09

## 2024-03-22 RX ADMIN — ONDANSETRON 4 MG: 2 INJECTION INTRAMUSCULAR; INTRAVENOUS at 11:21

## 2024-03-22 RX ADMIN — CALCIUM CARBONATE 600 MG (1,500 MG)-VITAMIN D3 400 UNIT TABLET 1 TABLET: at 11:21

## 2024-03-22 RX ADMIN — AMIODARONE HYDROCHLORIDE 400 MG: 200 TABLET ORAL at 07:55

## 2024-03-22 RX ADMIN — LEVALBUTEROL HYDROCHLORIDE 1.25 MG: 1.25 SOLUTION RESPIRATORY (INHALATION) at 16:24

## 2024-03-22 RX ADMIN — CYANOCOBALAMIN TAB 500 MCG 500 MCG: 500 TAB at 07:54

## 2024-03-22 RX ADMIN — Medication 40 MG: at 07:54

## 2024-03-22 RX ADMIN — CYCLOSPORINE 150 MG: 100 SOLUTION ORAL at 00:09

## 2024-03-22 RX ADMIN — CALCIUM CARBONATE 600 MG (1,500 MG)-VITAMIN D3 400 UNIT TABLET 1 TABLET: at 17:25

## 2024-03-22 RX ADMIN — ACETYLCYSTEINE 2 ML: 100 SOLUTION ORAL; RESPIRATORY (INHALATION) at 20:21

## 2024-03-22 RX ADMIN — SULFAMETHOXAZOLE AND TRIMETHOPRIM 1 TABLET: 400; 80 TABLET ORAL at 07:54

## 2024-03-22 RX ADMIN — LEVALBUTEROL HYDROCHLORIDE 1.25 MG: 1.25 SOLUTION RESPIRATORY (INHALATION) at 12:53

## 2024-03-22 RX ADMIN — PREDNISONE 5 MG: 5 TABLET ORAL at 07:54

## 2024-03-22 RX ADMIN — LEVOTHYROXINE SODIUM 25 MCG: 0.03 TABLET ORAL at 06:12

## 2024-03-22 RX ADMIN — INSULIN ASPART 1 UNITS: 100 INJECTION, SOLUTION INTRAVENOUS; SUBCUTANEOUS at 11:31

## 2024-03-22 RX ADMIN — APIXABAN 2.5 MG: 2.5 TABLET, FILM COATED ORAL at 07:55

## 2024-03-22 RX ADMIN — HYDROXYZINE HYDROCHLORIDE 25 MG: 25 TABLET, FILM COATED ORAL at 20:17

## 2024-03-22 RX ADMIN — LEVALBUTEROL HYDROCHLORIDE 1.25 MG: 1.25 SOLUTION RESPIRATORY (INHALATION) at 20:21

## 2024-03-22 RX ADMIN — LEVALBUTEROL HYDROCHLORIDE 1.25 MG: 1.25 SOLUTION RESPIRATORY (INHALATION) at 08:26

## 2024-03-22 RX ADMIN — METOPROLOL TARTRATE 25 MG: 25 TABLET, FILM COATED ORAL at 08:02

## 2024-03-22 RX ADMIN — Medication 2.5 MCG: at 07:55

## 2024-03-22 RX ADMIN — Medication 10 ML: at 13:44

## 2024-03-22 ASSESSMENT — ACTIVITIES OF DAILY LIVING (ADL)
ADLS_ACUITY_SCORE: 38
ADLS_ACUITY_SCORE: 42
ADLS_ACUITY_SCORE: 38

## 2024-03-22 NOTE — PROGRESS NOTES
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 On 2/17/24 she was transferred to ICU for worsening mental status and acute hypoxic and hypercarbic respiratory failure inspite of BiPAP requiring intubation. She was suspected to have PNA and started on antibiotics. Extubated on 2/19 without complications. Transitioned back to PO/TF from TPN. Developed progressively worsening respiratory acidosis and hypercarbia, and was re-admitted to ICU on 2/28/24 for close monitoring of intermittent BiPAP and possible intubation, however she improved on AVAPS setting and well enough to transfer back to medicine floor on 2/29/24. Transferred back to ICU 3/18 for recurrent hypercarbia with significant respiratory acidosis. Bicarb supplementation started, transferred to the floor 3/20. Goals include A. Flutter control and BiPAP adherence with control of anxiety/claustrophobia. GoC conversations ongoing.       Changes Today 03/22/2024  - Transplant Pulm ordering Metabolic Cart  - Last day of Fidaxomicin    - Nephrology recommending stopping PO bicarb as pH7.2 (closer to  where she lives for a )    #Severe respiratory acidosis, improved on BiPAP  #Acute on chronic hypoxic<hypercarbic respiratory failure  #S/P Lung transplant 2022 c/b right hemidiaphragm palsy  #Recurrent pneumonia, resolved  #Mucous plugging  #Suspected CARLEE  Patient received a bilateral lung transplant 6/28/22 for COPD. Was admitted 2/10 to address malnutrition and admitted to ICU on 2/17 with hypoxic hypercarbic respiratory failure. Intubated on 2/18 AM  due to worsening oxygen requirements, likely due to pulmonary edema given hx of ESRD requiring HD vs infection given hx of lung transplant, immunosuppressed status, and recurrent pneumonias. Extubated on 2/19 without complications. Has had issues with rising pCO2 that is responsive to BiPAP, but due to refusal, has required transferred to ICU (on 2/28 AM). Neurology consulted and MRI r/o central cause problem for respiratory drive. Started on AVAPS with improvement in mental status and VBG, and patient transferred back to medicine floor on 2/29.  Required another brief ICU stay 3/18 - 3/20, but did not require intubation during this time. Issues with anxiety/claustrophobia while using the mask, Atarax has helped though Zyprexa led to hallucinations.   - VBGs after ICU transfer with pH mid 7.2s and pCO2 mid 70s.   - Stop Oral bicarb 1300mg TID per Nephrology (ok to restart for pH < 7.1 ) + Cont high bicarb dialysis bath  - Continue BiPAP 16/5 with longer breaks on HFNC   - PRN hypertonic saline inhaler with albuterol nebs  - Continue secretion clearance: Xopenex + Mucomyst, acapella and CPT   - Low threshold for ABx  - Transplant pulmonology following, recs appreciated  - PTA immunosuppressive agent  >Prednisone 5 mg every morning, 2.5 mg every afternoon; Stop tacrolimus 3/10 and replaced with Cyclosporin 200mg BID   > overnight oximetry study suggestive of O2 vs CPAP need, as did require up to 2LPM overnight to prevent hypoxia  > Try to minimize O2 to preserve  respiratory drive, will give IVIG for DSA+   - Stopped PTA dapsone MWF for PJP prophylaxis; replaced with Bactrim (3/11)    - Limit medications that would depress respiration, ok to trial low dose benzo  - D/c'd theophylline 3/3/24 due to difficulty attaining therapeutic levels (started by ICU), could consider Modafinil   - repeat metabolic CART study ordered by Transplant Pulm  - sleep clinic eval at discharge     Antibiotics:  Vancomycin (2/17 - 2/17)  Zosyn (2/17 - 2/23) for HAP  Bactrim MWF, PJP ppx (previously on Dapsone)  Micafungin (2/18- 2/21)  Fidaxomicin (3/13-3/22)     #A-fib, A-flutter (recurrent)  Noted atrial fibrillation on arrival with HR elevated at 150, not sustained for > 10 minutes and otherwise hemodynamically stable. RVR resolved w/o medications.  PTA metoprolol (25mg BID) has been held through much of admission. On 3/17 new Aflutter developed overnight. HR 150s with flutter waves on EKG. Metoprolol administered along with Mg infusion with minimal response. Patient was then given amiodarone bolus and placed on drip which slightly lowered her HR in 120-130s. This was held for borderline hypotension 3/18, but restarted again with amiodarone 3/19.   - MAP goal > 65 mmHg  - Metoprolol tartrate dose 25 mg BID (hold if MAP<65)  - s/p Amio bolus and 24 hours infusion (stopped 3/21); Continue amiodarone 400mg BID PO  - Continuous telemetry        #Severe malnutrition   #FTT   #Hypoalbuminemia  #Gastroparesis, small bowel dysmotility  #S/P PEG/J with intolerance of enteral nutrition  #Chronic osmotic diarrhea/SIBO s/p Rifaximin  #Recurrent C.Diff (3rd ep)    #GERD  Patient with gastroparesis (presumed due to vagal injury) and small bowel dysmotility complicated by unintentional 40lb weight loss over the past year and now severe malnutrition. Previously intolerant to oral food intake due to nausea. Was initially admitted for portacath and TPN initiation since 2/13. After extubation has been able to  tolerate feeds without n/v from 2/20.  Per GI, next steps for workup for malnutrition would include CT enterography to evaluate for anatomic abnormalities contributing to malnutrition vs possible treatment for SIBO (though patient has had multiple courses of treatment in the past with no long term improvement). Patient couldn't tolerate the oral load for CT enterography on 2/21, so will defer this until she is able to tolerate greater PO load. On 2/23 , again discussed with patient the need of small bowel motility study if not improving outpatient through [facility]. No ongoing concerns for SIBO on 2/23 as patient is passing multiple episodes of stools and gas with no abdominal pain or distention. C-diff test negative on 2/21. Per RD, patient tolerating >300 kcal of intake PO currently, so stopped trickle Tube feeds and continuing with PO and TPN for nutrition (sufficient for preventing gut atrophy). As of 3/11 transplant pulmonology is worried that TPN is not a realistic plan to continue at home and would like to transition back to TF (RD oncerned pt is not absorbing any oral intake). Since transitioned off TPN. Transplant pulm aslo worried about mucosal toxicity. Repeat enteric studies showed recurrent C.diff;  Fidaxomicin x 10 days started (GI approved). Transplant pulm requesting repeat colonoscopy w/ Bx after completion of c.diff treatment, to r/o toxicity or other reason that diarrhea is present.  - regular diet + Restarted tube feeds (with goal of 35 mL/hr) if doing well off BiPAP  - GI consulted/signed off; appreciate recs              -PO Fidaxomicin 200 mg BID for 10 days (3/13-3/22) - diarrhea has improved since start of tx               -Consider reconsult GI next week, after  metabolic CART study, for future colonoscopy if diarrhea returns   - May benefit from small bowel motility study if not improving outpatient through [facility].   - PPI BID  - PRN bowel regimen     #B/L Neuropathic Pain   New pain b/l over the  past few days. Last A1c <4.2 (7/11/23). End stage renal dz can cause it too, TSH wnl. B12 elevated. B6 normal. Copper low (56.3 (), zinc 48.3 (mildly low).   -Consider gabapentin in the coming days   -Neuro Recs:  -No obvious clinical history or exam findings to suggest neurologic/neuromuscular causes of respiratory weakness  -Neuropathy labs currently pending  -HOLD B12 supplement (Supratheraputic on 3/15)     #Acute on chronic anemia 2/2 ESRD  Hgb stable 7-8s, with no acute signs of bleeding. Aute drop 2/29 from 8.2 > 6.6 after two rechecks, no overt signs of bleeding; required 1U pRBC transfusion.  Stable.   - continue epogen per nephrology with dialysis  - venofer 50 mcg qweek with dialysis      #ESRD on HD  #Hypervolemic hyponatremia, resolved  #Anion gap metabolic acidosis, resolved  #Mild hyperphosphatemia  Patient is ESRD on T/Th/Sat HD as outpt, Hgb stabilizing. HD tolerating well. Briefly required extra Monday runs, not since being off TPN. She would prefer longer sessions to more days.   - CBC and CMP daily  - Strict I/Os  - Daily weight   - Increased bicarb bath  -Currently holding Sevelamer but may need to resume in the coming days per Nephrology.      # Hypothyroidism, possibly transient   Patient with new low T4 at 0.64 and TSH at 6.5, concerning for new hypothyroidism vs sick thyroid syndrome. TSH with appropriate response, more consistent with elevation in the setting of acute illness. ICU team on 2/28 recommended initiation of treatment for possible hypothyroid as this can improve respiratory muscle function in the setting of weakness and malnutrition.   - continue liothyronine and levothyroxine per ICU team recommendations     #Left upper extremity unilateral edema, improving   #Bilateral pedal and ankle edema, improving  Edema due to third spacing due to hypoalbuminemia vs HF. BNP >27949 at admission, Echo on 2/18 shows EF of 55-60% with collapsible IVC. Extremity edema 2/2 to  hypoalbuminemia. Upper limb duplex USG on 2/18 negative for DVT.  USG left arm on 2/21 shows steel physiology and Vascular Surgery consulted (see below). Overall edema in extremities have improved significantly  - Continue daily weights  - Strict I/Os  - Elevation and wrapping of only lower legs as needed and increased UF per nephrology for volume management; no wrapping of Left upper extremity with dialysis fistula  - HD as noted above      #Steal physiology of LUE dialysis access fistula  LUE arterial US obtained 2/21 with concern for LUE edema. US of fistula demonstrated steal physiology. Discussed with nephrology, and vascular surgery consulted on 2/22. Vascular surgery has only minor concerns for steal symptoms and given that the AVF is working well, they are okay with outpatient fistulograms/ venoplasty with wrist brachial index and PPG's, unless new concerns arise.  - plan for outpatient workup as above; nephrology in agreement with outpatient workup.     # Right hip fracture s/p ORIF (December 2023)   - PT/OT     # Hx EBV viremia   # Hypogammaglobulinemia  # Chronic immunosuppression 2/2 lung transplant  Patient has been afebrile and has not had leukocytosis however she is under immunosuppression. Given acute respiratory failure and hx of recurrent pneumonias, she was started on empiric abx for concerns over new pneumonia. RVP and cultures no growth to date.   - EBV 27K (decreased compared to prior)   - Did receive IVIG      #[facility]  Care conference on 3/15 with Transplant Pulm, [facility] Care, Medicine, daughters (Julia Nash) and Transplant SW. Overall medical updates provided and Qs answered. With declining respiratory acidosis on labs, discussed code status 3/18. Patient initially not desiring any escalation of her care to ICU/intubation with frustration re overall course. However, after discussing with her daughter on the phone she and family elected to remain full code and agreed to ICU admission and  intubation if necessary. Still full code  - Transplant pulm requesting care conference this week; will schedule for early next week           Diet: Adult Formula Drip Feeding: Continuous [employer] Renal Support; Jejunostomy; Goal Rate: 35 mL/hr (goal rate) held for 1 hour before/after Synthroid administration, per PharmD; mL/hr  Regular Diet Adult    DVT Prophylaxis: apixaban 2.5mg BID   Dong Catheter: Not present  Fluids: TF, PO  Lines: PRESENT      PICC 03/04/24 Double Lumen Right Basilic TPN. PICC okay to use.-Site Assessment: WDL  Hemodialysis Vascular Access Arteriovenous graft Superior Arm-Site Assessment: Thrill present;Bruit present      Cardiac Monitoring: ACTIVE order. Indication: Tachyarrhythmias, acute (48 hours)  Code Status: Full Code      Clinically Significant Risk Factors              # Hypoalbuminemia: Lowest albumin = 2.6 g/dL at 2/18/2024  5:13 AM, will monitor as appropriate             # Severe Malnutrition: based on nutrition assessment      # Financial/Environmental Concerns: none         Disposition Plan        The patient's care was discussed with the Attending Physician, Dr. Cameron  .     Betty Diaz, PGY4  Internal Medicine-Pediatrics  Pager: [phone]      Medicine Service, [place] TEAM 1  [facility]  Securely message with Dick or Bro (more info)  Text page via pic5 Paging/Directory   See signed in provider for up to date coverage information  ______________________________________________________________________    Interval History   Nursing notes reviewed. Pt worse BiPAP 3094-7912; pH closer to her baseline. Pt was briefly seen prior to starting PT. No complaints, Qs or concerns.    Physical Exam   Vital Signs: Temp: 97.4  F (36.3  C) Temp src: Axillary BP: 100/60 Pulse: 104   Resp: 22 SpO2: 100 % O2 Device: BiPAP/CPAP Oxygen Delivery: 4 LPM  Weight: 106 lbs 11.24 oz    General Appearance: Comfortable appearing sitting at the edge  of the bed on NC.  Frail and thin   Respiratory: No respiratory distress on NC.    Cardiovascular: appears well perfused.    Skin: Scattered ecchymoses and superficial wounds in various stages of healing on the bilateral arms.  Lines in place as above.  Other: Alert and oriented x 4.  Moves Extremities spontaneously.  Grossly normal sensorium.  No obvious focal deficits.  Mood is good and affect congruent.  Word choice, behavior, eye contact all appropriate.    Medical Decision Making              Data     I have personally reviewed the following data over the past 24 hrs:    8.9  \   10.0 (L)   / 180     137 97 (L) 43.1 (H) /  130 (H)   3.7 30 (H) 2.81 (H) \     ALT: 9 AST: 13 AP: 131 TBILI: 0.3   ALB: 3.6 TOT PROTEIN: 5.6 (L) LIPASE: N/A     INR:  1.06 PTT:  N/A   D-dimer:  N/A Fibrinogen:  N/A

## 2024-03-22 NOTE — PROGRESS NOTES
Transfer  Transferred from:   Via:bed  Reason for transfer: Pt appropriate for 6B- improved patient condition  Family: Aware of transfer  Belongings: Received with pt  Chart: Received with pt  Medications: Meds received from old unit with pt  Code Status verified on armband: yes  2 RN Skin Assessment Completed By: Jayson. Blanchable redness on coccyx   Med rec completed: no  Suction/Ambu bag/Flowmeter at bedside: yes    Report received from: ICU Nurse- Madan  Pt status:LOCx4. VSS. 3L nasal cannula bipap at night. A-fib/Aflutter. Regular diet- sudha counts. Continuous Tube feeds infusing. SBA up to commode. Frequent stools. L double lumen PICC, Fistula on L arm.

## 2024-03-22 NOTE — PROVIDER NOTIFICATION
Provider notified: Srikanth Cedillo    9022 - Do we want an AM blood gas? Last one was 3/20. Have conditional orders w/ no parameters    Orders Received: parameters added.      Provider notified: Srikanth Cedillo    3185 - Critical pH=7.19 and pCO2=86     Orders Received pt to go back on bipap, recheck gas in 30-40 w, conditional order.

## 2024-03-22 NOTE — PLAN OF CARE
Neuro: A&Ox4.   Cardiac: afib/flutter, HR 80-100s   Respiratory: 4L NC >90%, desats fairly quickly while up to comode to 60's placed on oxymask 10L until recovered. Encouraged bipap, tolerated 9519-1379. Pt took off bipap w/o calling RN, desalted to 60s again. AM blood gas checked, placed back on bipap.   GI/: Anuric. BM X2  Diet/appetite: Tolerating reg diet, sudha counts. Eating well, snacks at night. TF @ 35mL/hr, standard FWF.  Activity:  SBA to commode  Pain: At acceptable level on current regimen.   Skin: No new deficits noted.  LDA's: G/J, L fistula, R PICC - SL    Plan: Continue with POC. Notify primary team with changes.

## 2024-03-22 NOTE — PROGRESS NOTES
Pulmonary Medicine  Cystic Fibrosis - Lung Transplant Team  Progress Note  2024     Patient: Sofie Rodriguez  MRN: [id number]  : 1960 (age 63 year old)  Transplant: 2022 (Lung), POD#[id number]  Admission date: 2/10/2024    Assessment & Plan:     Sofie Rodriguez is a 63 year old female with h/o COPD s/p BSLT () with course complicated by post-operative hemidiaphragm palsy, recurrent PNAs, positive DSA, EBV viremia, hypogammaglobulinemia, severe gastroparesis s/p G/J tube placement (22) and pyloric botox (23), GI bleed 2/2 pyloric ulcer, hemobilia s/p ERCP and MRCP, chronic diarrhea, recurrent C diff colitis, ESRD on iHD, recurrent falls with injuries (recent right hip fx s/p ORIF 2023), deconditioning, and FTT.  Admitted 2/10 for failure to thrive and initiation of TPN/lipids.  Emergent intubation - for hypoxic and hypercapneic respiratory failure with severe respiratory acidosis and encephalopathy.  Transient improvements in recurrent acute on chronic hypercapnia respiratory failure with increased iHD, infectious workup unremarkable.  Returned to ICU - and again 3/18 d/t tenuous respiratory status complicated by variable daytime NIPPV tolerance. Now transferred out of the ICU on 3/20.      Today's recommendations:  - AVAPS at night and during the daytime as needed,with naps, anxiolytics PRN, VBG per primary team  - Sodium bicarbonate initiated on 3/19, Renal recommending hold today  - Repeat CSA level likely mildly elevated, dose reduced and will repeat trend level on 3/23 with steady state on 3/25 (ordered)   - Repeat DSA due , Repeat Prospera pending  - Would encourage time out of bed and up to chair  - Will consider repeat of metabolic cart now that back on tube feeds  - Tentative plan for care conference for medical update and long term planning per primary      Acute on chronic hypoxic/hypercapneic respiratory failure:  S/p bilateral sequential lung transplant  for COPD:   Hypoventilation, Suspected CARLEE: CT (2/7) with decreased MARLON opacities but new tree in bud RLL opacities.  PFTs unchanged in clinic (but ATS criteria not met), remain significantly below her baseline.  Baseline hypoxia with 2L NC overnight PTA.  S/p intubation 2/17-2/19 for respiratory decompensation with encephalopathy and severe respiratory acidosis, CT with new patchy consolidative and nodular opacities, GGO primarily in the bases and intralobular septal thickening (concerning for pulmonary edema given recent addition of TPN nutrition).  Bronch (2/18) with very friable tissue, cutlures only with C. glabrata.  Persistent respiratory failure also complicated by hypoventilation and deconditioning d/t malnutrition.  Returned to ICU 2/28-2/29, intubation deferred given improvement with continuous NIPPV with minimal interruptions (sodium bicarb also stopped, likely partially contributing).  Neurology consulted 2/27, MRI brain (3/1) without acute intracranial pathology.  SNIFF (3/8) normal.  Metabolic CART suggested overfeeding on TPN.  NIF and FVC continue to downtrend (3/5-3/15) prompting concern for potential neuromuscular cause.  Continues with refractory severe hypercapneic respiratory failure and recurrent intolerance of extended time off NIPPV.  Returned to ICU again 3/18, respiratory panel negative, CXR with increased pulmonary edema but unable to pull enough volume with iHD (weight up 5.2 kg 3/14 to 3/19) given hypotension.  Overall, her PCO2 retention is likely multifactorial with a component being from overfeeding (though remedied with nutrition adjustment), metabolic compensation barrier d/t renal failure, pulmonary edema, bicarb loss with diarrhea, hypoventilation with declining FVC concerning for neuromuscular etiology (though Neurology consult was not revealing), and NIPPV intolerance due to claustrophobia.  Increased adherence to BiPAP with subsequent improvement in VBG.    - AVAPS NOC and  with naps during the daytime, anxiolytics PRN, VBG per primary team  - Xopenex QID, Mucomyst QID and Volera QID (3/19 per primary)  - Continue to defer ABX but low threshold to initiate with leukocytosis or fever     Immunosuppression:  AZA previously stopped 5/2023 d/t low ImmuKnow assay and EBV viremia.  ImmuKnow (2/27) remained low at 88.  Transitioned from [place] to CSA 3/11.  -  mg BID.  Goal 140-170.  Repeat level 3/21 likely mildly supratherapeutic, dose reduced to 150 mg Qpm and 200 mg Qam. Trend level ordered for 3/23 and steady state on 3/25.   - Prednisone 5 mg qAM / 2.5 mg qPM     Prophylaxis:   - Bactrim qMWF for PJP ppx (3/13, prior dapsone through 3/11)  - CMV ppx not currently indicated, D+/R+, CMV negative 3/18     Positive DSA: AMR treatment deferred given frailty and stable PFTs.  DSA DQB2 stable to decreased on 3/6 with 5667 mfi.  Most recent cell-free DNA Prospera (2/18) mildly elevated at 1.04 (concerning for possible rejection), which was increased from prior level of 0.12 (1/10).  IST increase deferred at that time per Dr. Gong.  S/p IVIG (2/27) for DSA (IgG WNL).  - Repeat DSA due 4/6  - Repeat Prospera (3/18) pending      EBV viremia: CT CAP (2/7) without lymphadenopathy.  Most recent level (2/18) improved to 28k from 96k (2/7).  Repeat EBV (3/18) 23k.     Other relevant problems being managed by the primary team:      FTT:  Severe protein calorie malnutrition:  Gastroparesis s/p PEG/J, botox, and G-POEM:  SB hypomotility:  Pyloric ulcer:  Chronic nausea and osmotic diarrhea:  SIBO s/p rifaximin:   Recurrent C diff colitis: Chronic osmotic and infectious diarrhea since transplant with recurrent episodes of C diff.  Notable weight loss (40# in a year) d/t diarrhea, GI dysmotility, and intolerance of enteral feeds (PEG/J tube in place), most recently on elemental formula.  Extensive OP eval and f/u with GI. S/p port placement for TPN and lipids. Continued for ~5 weeks inpatient without  considerable improvement. TPN also not good long term option for home and attempting to resume tube feeds.   - Continuing tube feeds, tolerating thus far  - Persistent acidosis as above, metabolic cart showed likely overfeeding on TPN, will consider repeating now that back on tube feeds  - C diff positive (3/13), on fidaxomicin.     ESRD: CT with volume overload secondary to TPN volume, iHD increased from PTA TThSa schedule with unsustained improvement.  Management per Nephrology, dialysis via [facility] CVC.    - Per Renal now on TTS schedule     Goals of care: Care conference held with palliative and primary team on 3/15 for medical update with pt. and daughters.  Comfort cares discussed but pt. declining at this time.  Palliative following (our team discussed at length with palliative provider 3/19).    - Tentative plan for care conference for medical update and long term planning in coming days     We appreciate the excellent care provided by the MICU team.  Recommendations communicated via this note.  Will continue to follow along closely, please do not hesitate to call with any questions or concerns.    Yulisa Huff MD PhD  Lung Transplant  Pulmonary Critical Care     Subjective & Interval History:     Desaturation overnight when getting up and placed on 10L. Wore BiPAP for about 5 hours overnight. Desat when took of mask and placed back on mask after morning VBG worse. TF at 35.    This morning reports breathing ok. Tolerating AVAPS but does not like mask and wonders why should wear if blood gas still does not look good. Really did not like high flow. Denies headache. Thinks is tolerating tube feeds thus far. Feeling full but no nausea like has had in the past. Still having loose stools, about 7-8 yesterday.    Review of Systems:     C: No fever, no chills  INTEGUMENTARY/SKIN: No rash or obvious new lesions  ENT/MOUTH: No nasal congestion or drainage  RESP: See interval history  CV: No chest pain, no  palpitations, no peripheral edema, no orthopnea  GI: No nausea, no vomiting, + frequent loose stools, no reflux symptoms  : Anuric  MUSCULOSKELETAL: No myalgias, no arthralgias  ENDOCRINE: Blood sugars with adequate control  NEURO: + Occ headache  PSYCHIATRIC: Mood is stable     Physical Exam:     All notes, images, and labs from past 24 hours (at minimum) were reviewed.    Vital signs:  Temp: 97.4  F (36.3  C) Temp src: Axillary BP: 100/60 Pulse: 104   Resp: 22 SpO2: 99 % O2 Device: Nasal cannula Oxygen Delivery: 1/4 LPM   Weight: 48.4 kg (106 lb 11.2 oz)  I/O:       Intake/Output Summary (Last 24 hours) at 3/22/2024 0850  Last data filed at 3/22/2024 0750  Gross per 24 hour   Intake 980 ml   Output 1000 ml   Net -20 ml       Constitutional: Lying supine in bed, in no apparent distress.   HEENT: Eyes with pink conjunctivae, anicteric. Band-aid on bridge of nose.  PULM: Diminished air flow bilaterally in bases.  No crackles, no rhonchi, no wheezes.  Non-labored breathing.   CV: Normal S1 and S2.  Tachycardia.  + murmur.  No peripheral edema.   ABD: NABS, soft, nontender, nondistended.  PEG/J site without erythema or discharge.  MSK: Moves all extremities.  + muscle wasting.   NEURO: Awake, alert, conversant.   SKIN: Warm, dry, fragile, scattered ecchymosis.   PSYCH: Mood stable.     Data:     LABS    CMP:   Recent Labs   Lab 03/22/24  0718 03/22/24  0453 03/22/24  0447 03/21/24  2226 03/21/24  0926 03/21/24  0443 03/20/24  1629 03/20/24  1257 03/20/24  0354 03/20/24  0225 03/19/24  0342 03/19/24  0218   NA  --   --  137  --   --  135  --   --   --  138  --  138   POTASSIUM  --   --  3.7  --   --  3.9  --  3.6  --  3.4  --  4.8   CHLORIDE  --   --  97*  --   --  94*  --   --   --  96*  --  99   CO2  --   --  30*  --   --  30*  --   --   --  30*  --  25   ANIONGAP  --   --  10  --   --  11  --   --   --  12  --  14   * 126* 136* 107*   < > 125*   < >  --    < > 113*   < > 114*   BUN  --   --  43.1*  --   --   "56.4*  --   --   --  29.4*  --  67.0*   CR  --   --  2.81*  --   --  3.37*  --   --   --  2.13*  --  3.60*   GFRESTIMATED  --   --  18*  --   --  15*  --   --   --  26*  --  14*   ESTUARDO  --   --  8.9  --   --  9.0  --   --   --  8.9  --  8.9   MAG  --   --  2.3  --   --  2.8*  --  2.8*  --  1.6*  --  2.0   PHOS  --   --  4.9*  --   --  5.8*  --  4.5  --   --   --  5.9*   PROTTOTAL  --   --  5.6*  --   --  5.6*  --   --   --  5.8*  --  5.9*   ALBUMIN  --   --  3.6  --   --  3.8  --   --   --  4.0  --  3.7   BILITOTAL  --   --  0.3  --   --  0.3  --   --   --  0.4  --  0.4   ALKPHOS  --   --  131  --   --  137  --   --   --  115  --  131   AST  --   --  13  --   --  17  --   --   --  17  --  24   ALT  --   --  9  --   --  11  --   --   --  10  --  12    < > = values in this interval not displayed.     CBC:   Recent Labs   Lab 03/22/24  0447 03/21/24  0443 03/20/24  0225 03/19/24  0021   WBC 8.9 9.6 6.1 8.4   RBC 2.84* 2.80* 2.45* 2.69*   HGB 10.0* 10.2* 8.6* 9.8*   HCT 34.8* 33.7* 29.5* 33.3*   * 120* 120* 124*   MCH 35.2* 36.4* 35.1* 36.4*   MCHC 28.7* 30.3* 29.2* 29.4*   RDW 17.3* 17.4* 17.2* 18.2*    191 156 170       INR:   Recent Labs   Lab 03/22/24  0447 03/21/24  1510 03/18/24  0651   INR 1.06 0.98 1.10       Glucose:   Recent Labs   Lab 03/22/24  0718 03/22/24  0453 03/22/24  0447 03/21/24  2226 03/21/24  1826 03/21/24  1334   * 126* 136* 107* 116* 131*       Blood Gas:   Recent Labs   Lab 03/22/24  0714 03/22/24  0556 03/20/24  1257   PHV 7.20* 7.19* 7.27*   PCO2V 83* 86* 74*   PO2V 53* 47 46   HCO3V 33* 33* 33*   LINDY 2.7 3.0 4.8*   O2PER 40 36 35       Culture Data No results for input(s): \"CULT\" in the last 168 hours.    Virology Data:   Lab Results   Component Value Date    FLUAH1 Not Detected 03/18/2024    FLUAH3 Not Detected 03/18/2024    [id number] Not Detected 03/18/2024    IFLUB Not Detected 03/18/2024    RSVA Not Detected 03/18/2024    RSVB Not Detected 03/18/2024    PIV1 Not Detected " 03/18/2024    PIV2 Not Detected 03/18/2024    PIV3 Not Detected 03/18/2024    HMPV Not Detected 03/18/2024       Historical CMV results (last 3 of prior testing):  Lab Results   Component Value Date    CMVQNT Not Detected 03/18/2024    CMVQNT Not Detected 02/19/2024    CMVQNT Not Detected 02/07/2024     Lab Results   Component Value Date    CMVLOG 3.2 07/12/2023    CMVLOG <2.1 04/19/2023    CMVLOG 3.5 01/25/2023       Urine Studies    Recent Labs   Lab Test 02/18/24  0222 05/18/23  0627   URINEPH 7.5* 5.0   NITRITE Negative Negative   LEUKEST Trace* Moderate*   WBCU 66* 21*       Most Recent [place]reeze Pulmonary Function Testing (FVC/FEV1 only)  FVC-Pre   Date Value Ref Range Status   02/07/2024 1.19 L    01/10/2024 1.12 L    08/29/2023 1.48 L    07/25/2023 1.55 L      FVC-%Pred-Pre   Date Value Ref Range Status   02/07/2024 42 %    01/10/2024 39 %    08/29/2023 53 %    07/25/2023 55 %      FEV1-Pre   Date Value Ref Range Status   02/07/2024 1.13 L    01/10/2024 1.10 L    08/29/2023 1.43 L    07/25/2023 1.54 L      FEV1-%Pred-Pre   Date Value Ref Range Status   02/07/2024 51 %    01/10/2024 49 %    08/29/2023 64 %    07/25/2023 69 %        IMAGING    Recent Results (from the past 48 hour(s))   CT Chest w/o Contrast    Narrative    EXAMINATION: CT CHEST W/O CONTRAST, 3/18/2024 7:16 PM    CLINICAL HISTORY: acute on chronic hypercapnic respiratory failure of  unclear etiology, s/p BSLT 2022    COMPARISON: Radiograph earlier same day. CT 3/7/2024..    TECHNIQUE: CT imaging obtained through the chest without contrast.  Coronal, sagittal and axial MIP reformatted images obtained and  reviewed. Noncontrast exam.    FINDINGS:    Lines, tubes, devices: Right upper extremity PICC tip terminates at  the superior cavoatrial junction.    Lungs: Changes of bilateral lung transplantation. Mucous plugging in  the airways of the right lower lobe. There is right greater than left  lower lobe and lingular consolidation with surrounding  diffuse  groundglass opacities. Smooth interlobular septal thickening. The  central tracheobronchial tree is patent. No areas of bronchiectasis or  architectural distortion. No pleural effusion, pulmonary  consolidation, or pneumothorax. Mild bibasilar atelectasis.     Mediastinum: The visualized thyroid is unremarkable.Heart size is  enlarged. No pericardial effusion. The thoracic aorta and main  pulmonary artery are within normal limits. Standard branching pattern  of the great vessels. No abnormal thoracic lymph nodes by size  criteria although difficult to evaluate on noncontrast imaging with  limited fat  structures. Numerous prominent mediastinal  lymph nodes. Patulous esophagus.    Bones and soft tissues: No suspicious osseous lesion.    Upper abdomen:  Limited evaluation of the upper abdomen. No acute  pathology of the visualized solid organs. Moderate atherosclerotic  calcifications of the abdominal aorta. Several small nonobstructing  left renal stones measuring up to 3 mm.    Diffuse body wall edema.      Impression    IMPRESSION:   Bibasilar consolidation, smooth interlobular septal, and diffuse  groundglass opacities. Given mucous plugging in the airways of the  right lower lobe concerning for aspiration pneumonia which may be  superimposed on pulmonary edema.    I have personally reviewed the examination and initial interpretation  and I agree with the findings.    YANNICK BENAVIDEZ MD         SYSTEM ID:  [id number]

## 2024-03-22 NOTE — PLAN OF CARE
Goal Outcome Evaluation:    Neuro: A&Ox4.   Cardiac: afib, rates 100-120s.  SBPs soft. Maps > 60.  Respiratory: Sating >92%. Intermittently on bipap @30-40% FiO2 and NC at 4L. ANAYA.   GI/: Anuric on HD. BM X2. Loose stools. G. Clamped.   Diet/appetite: Tolerating regular diet; sudha counts. TF @ 35ml/hr. 30 ml FWF q4h.  Eating well. C/o nausea x1. PRN zofran given.   Activity:  Assist of 1 w/ walker and GB, up to chair and in halls.  Pain: At acceptable level on current regimen.   Skin: No new deficits noted. Scattered bruising.   LDA's: left AV fistula. Right double lumen picc, G clamped, J w/ TF.     Plan: plan for dialysis scheduled tomorrow at 0740. Please given PRM EMLA cream 1 hour prior to HD. Continue with POC. Notify primary team with changes.

## 2024-03-22 NOTE — PROGRESS NOTES
Nephrology Progress Note  03/22/2024         Assessment & Recommendations:   Sofie Rodriguez is a 63 year old year old female with ESKD, COPD s/p b/l lung transplant in 6/2022 with multiple complications, gastroparesis s/p GJ tube, GIB, chronic diarrhea, recurrent c-diff, FTT with inability to tolerate any tube feeds, R hip fx s/p ORIF 12/2023, admitted on 2/10 for initiation of TPN/lipids, transferred to ICU on 2/17 with worsening mental status and respiratory acidosis in spite of bipap, ultimately intubated on 2/18, being treated for PNA, also with volume overload in setting of high volume TPN. Persistent respiratory acidosis in spite of volume off, transferred to ICU for hypotension and respiratory failure, improved on bipap, now floor status again 3/1. Transferred to ICU 3/18 again with worsening hypercapnic respiratory failure. Now back out of ICU    # ESKD - TTS, LUE AVG, 3hr, 45.5 kg, [facility], Dr. Andres Fairbanks.  - Continue HD on TTS schedule   - Requires EMLA cream an hour before HD  - please avoid PICC which will compromise future dialysis accesses; a midline is much preferred for this patient         # Dialysis access: LUE AVG placed 3-4 months ago, per pt. She had arm swelling and a fistulogram a couple months ago and swelling improved but now has redeveloped.  - US with c/f possible steal syndrome  - per vascular surgery, no concern for steal syndrome, ok to go ahead with fistulogram  - given that AVF is working well on dialysis (450 BFR) and at this time IR is only able to perform fistulograms when AVF isn't working well, will defer to OP for management.     # Persistent respiratory acidosis: CO2 improved on bipap overnight  - difficulty tolerating bipap due to claustrophobia, but wearing this lately  - balancing act between giving bicarb to maintain pH in setting of severe persistent respiratory acidosis with bicarb quickly converting CO2 and worsening overall status  - recommend stopping  "PO bicarb, ok to restart for pH < 7.1 but would stop again once pH is 7.2; will continue to provide bicarb with dialysis  - transplant pulm following    # Aflutter: on amio and BB  # Volume/BP: Anuric; on metoprolol soln 25 mg bid   - weights are variable, unsure of accuracy; appears close to euvolemia with much improvement in LE edema  - CXR 3/18 with worsening pulm edema, had extra run on Monday for more fluid off  - UF goal 1-2L generally        # Nutrition: on [employer] tube feeds        # Anemia 2/2 ESKD  On Venofer 50 qwk, Mircera last dose 1/9/2024  - hgb 9-10's  - Will continue Venofer 50 mcg q week (Tuesday)  - continue epogen 8000 units via HD    # BMD:   - Ca 8-9's, phos 4.9, alb 3.6  - sevelamer on hold      Recommendations were communicated to primary team via note     RABIA Moctezuma   Division of Renal Disease and Hypertension  [phone]      Interval History :   Seen bedside, reports being on bipap overnight though worsening hypercapnia this AM. Mentally clear and reports No n/v, CP, chills. Breathing feels ok    Review of Systems:   4 point ROS neg other than as noted above    Physical Exam:   I/O last 3 completed shifts:  In: 930 [I.V.:15; NG/GT:180]  Out: 1000 [Other:1000]   /60 (BP Location: Right arm, Cuff Size: Adult Small)   Pulse 104   Temp 97.4  F (36.3  C) (Axillary)   Resp 22   Ht 1.57 m (5' 1.81\")   Wt 48.4 kg (106 lb 11.2 oz)   SpO2 93%   BMI 19.64 kg/m       GENERAL APPEARANCE: NAD  PULM: diminished  CV: RRR    trace pedal/ankle  GI: soft   INTEGUMENT: no cyanosis, no rashes on exposed skin  NEURO: a/o3  Access Left AVG     Labs:   All labs reviewed by me  Electrolytes/Renal -   Recent Labs   Lab Test 03/22/24  0718 03/22/24  0453 03/22/24  0447 03/21/24  0926 03/21/24  0443 03/20/24  1629 03/20/24  1257 03/20/24  0354 03/20/24  0225   NA  --   --  137  --  135  --   --   --  138   POTASSIUM  --   --  3.7  --  3.9  --  3.6  --  3.4   CHLORIDE  --   --  97*  --  94*  " --   --   --  96*   CO2  --   --  30*  --  30*  --   --   --  30*   BUN  --   --  43.1*  --  56.4*  --   --   --  29.4*   CR  --   --  2.81*  --  3.37*  --   --   --  2.13*   * 126* 136*   < > 125*   < >  --    < > 113*   ESTUARDO  --   --  8.9  --  9.0  --   --   --  8.9   MAG  --   --  2.3  --  2.8*  --  2.8*  --  1.6*   PHOS  --   --  4.9*  --  5.8*  --  4.5  --   --     < > = values in this interval not displayed.       CBC -   Recent Labs   Lab Test 03/22/24 0447 03/21/24 0443 03/20/24 0225   WBC 8.9 9.6 6.1   HGB 10.0* 10.2* 8.6*    191 156       LFTs -   Recent Labs   Lab Test 03/22/24 0447 03/21/24 0443 03/20/24  0225   ALKPHOS 131 137 115   BILITOTAL 0.3 0.3 0.4   ALT 9 11 10   AST 13 17 17   PROTTOTAL 5.6* 5.6* 5.8*   ALBUMIN 3.6 3.8 4.0       Iron Panel -   Recent Labs   Lab Test 09/26/22  0555 09/03/22  1039 08/24/22  0810   IRON 54 21* 41   IRONSAT 22 9* 21   CARLOS 769* 343* 334*         Imaging:  All imaging studies reviewed by me.     Current Medications:   acetylcysteine  2 mL Nebulization 4x daily    amiodarone  400 mg Oral BID    apixaban ANTICOAGULANT  2.5 mg Oral BID    calcium carbonate-vitamin D  1 tablet Per J Tube TID w/meals    cyanocobalamin  500 mcg Per Feeding Tube Daily    cycloSPORINE modified  150 mg Per G Tube QPM    cycloSPORINE modified  200 mg Per Feeding Tube QAM    fidaxomicin  200 mg Oral BID    heparin lock flush  5-20 mL Intracatheter Q24H    insulin aspart  1-4 Units Subcutaneous Q4H    levalbuterol  1.25 mg Nebulization 4x Daily    levothyroxine  25 mcg Oral QAM AC    lidocaine  1 patch Transdermal Q24h    liothyronine  2.5 mcg Oral BID    metoprolol tartrate  25 mg Per J Tube BID    OLANZapine zydis  5 mg Oral At Bedtime    pantoprazole  40 mg Per J Tube BID    predniSONE  5 mg Per J Tube QAM    And    predniSONE  2.5 mg Per J Tube QPM    [Held by provider] sevelamer carbonate (RENVELA)  0.8 g Oral BID    sodium bicarbonate  1,300 mg Oral or Feeding Tube TID     sodium chloride (PF)  10 mL Intracatheter Q8H    sodium chloride (PF)  10-40 mL Intracatheter Q8H    sulfamethoxazole-trimethoprim  1 tablet Oral Q Mon Wed Fri AM      dextrose      - MEDICATION INSTRUCTIONS -      - MEDICATION INSTRUCTIONS -      sodium chloride 0.9%       RABIA Moctezuma

## 2024-03-23 ENCOUNTER — APPOINTMENT (OUTPATIENT)
Dept: CT IMAGING | Facility: CLINIC | Age: 64
DRG: 640 | End: 2024-03-23
Attending: INTERNAL MEDICINE
Payer: MEDICARE

## 2024-03-23 ENCOUNTER — APPOINTMENT (OUTPATIENT)
Dept: GENERAL RADIOLOGY | Facility: CLINIC | Age: 64
DRG: 640 | End: 2024-03-23
Attending: INTERNAL MEDICINE
Payer: MEDICARE

## 2024-03-23 LAB
ALBUMIN SERPL BCG-MCNC: 3.7 G/DL (ref 3.5–5.2)
ALP SERPL-CCNC: 139 U/L (ref 40–150)
ALT SERPL W P-5'-P-CCNC: 9 U/L (ref 0–50)
ANION GAP SERPL CALCULATED.3IONS-SCNC: 13 MMOL/L (ref 7–15)
AST SERPL W P-5'-P-CCNC: 12 U/L (ref 0–45)
BASE EXCESS BLDV CALC-SCNC: 0.7 MMOL/L (ref -3–3)
BASE EXCESS BLDV CALC-SCNC: 0.9 MMOL/L (ref -3–3)
BASE EXCESS BLDV CALC-SCNC: 1 MMOL/L (ref -3–3)
BASE EXCESS BLDV CALC-SCNC: 1.2 MMOL/L (ref -3–3)
BASOPHILS # BLD AUTO: ABNORMAL 10*3/UL
BASOPHILS # BLD MANUAL: 0 10E3/UL (ref 0–0.2)
BASOPHILS NFR BLD AUTO: ABNORMAL %
BASOPHILS NFR BLD MANUAL: 0 %
BILIRUB SERPL-MCNC: 0.3 MG/DL
BUN SERPL-MCNC: 70 MG/DL (ref 8–23)
CALCIUM SERPL-MCNC: 9 MG/DL (ref 8.8–10.2)
CHLORIDE SERPL-SCNC: 96 MMOL/L (ref 98–107)
CREAT SERPL-MCNC: 4.13 MG/DL (ref 0.51–0.95)
CYCLOSPORINE BLD LC/MS/MS-MCNC: 117 UG/L (ref 50–400)
DEPRECATED HCO3 PLAS-SCNC: 29 MMOL/L (ref 22–29)
EGFRCR SERPLBLD CKD-EPI 2021: 12 ML/MIN/1.73M2
EOSINOPHIL # BLD AUTO: ABNORMAL 10*3/UL
EOSINOPHIL # BLD MANUAL: 0 10E3/UL (ref 0–0.7)
EOSINOPHIL NFR BLD AUTO: ABNORMAL %
EOSINOPHIL NFR BLD MANUAL: 0 %
ERYTHROCYTE [DISTWIDTH] IN BLOOD BY AUTOMATED COUNT: 17.3 % (ref 10–15)
GLUCOSE BLDC GLUCOMTR-MCNC: 107 MG/DL (ref 70–99)
GLUCOSE BLDC GLUCOMTR-MCNC: 126 MG/DL (ref 70–99)
GLUCOSE BLDC GLUCOMTR-MCNC: 138 MG/DL (ref 70–99)
GLUCOSE BLDC GLUCOMTR-MCNC: 143 MG/DL (ref 70–99)
GLUCOSE BLDC GLUCOMTR-MCNC: 146 MG/DL (ref 70–99)
GLUCOSE BLDC GLUCOMTR-MCNC: 148 MG/DL (ref 70–99)
GLUCOSE BLDC GLUCOMTR-MCNC: 160 MG/DL (ref 70–99)
GLUCOSE BLDC GLUCOMTR-MCNC: 184 MG/DL (ref 70–99)
GLUCOSE SERPL-MCNC: 161 MG/DL (ref 70–99)
HCO3 BLDV-SCNC: 31 MMOL/L (ref 21–28)
HCO3 BLDV-SCNC: 31 MMOL/L (ref 21–28)
HCO3 BLDV-SCNC: 32 MMOL/L (ref 21–28)
HCO3 BLDV-SCNC: 32 MMOL/L (ref 21–28)
HCT VFR BLD AUTO: 35.3 % (ref 35–47)
HGB BLD-MCNC: 10 G/DL (ref 11.7–15.7)
IMM GRANULOCYTES # BLD: ABNORMAL 10*3/UL
IMM GRANULOCYTES NFR BLD: ABNORMAL %
INR PPP: 1.12 (ref 0.85–1.15)
LYMPHOCYTES # BLD AUTO: ABNORMAL 10*3/UL
LYMPHOCYTES # BLD MANUAL: 0.3 10E3/UL (ref 0.8–5.3)
LYMPHOCYTES NFR BLD AUTO: ABNORMAL %
LYMPHOCYTES NFR BLD MANUAL: 3 %
MAGNESIUM SERPL-MCNC: 2.2 MG/DL (ref 1.7–2.3)
MCH RBC QN AUTO: 35.1 PG (ref 26.5–33)
MCHC RBC AUTO-ENTMCNC: 28.3 G/DL (ref 31.5–36.5)
MCV RBC AUTO: 124 FL (ref 78–100)
MONOCYTES # BLD AUTO: ABNORMAL 10*3/UL
MONOCYTES # BLD MANUAL: 0.3 10E3/UL (ref 0–1.3)
MONOCYTES NFR BLD AUTO: ABNORMAL %
MONOCYTES NFR BLD MANUAL: 3 %
NEUTROPHILS # BLD AUTO: ABNORMAL 10*3/UL
NEUTROPHILS # BLD MANUAL: 9.3 10E3/UL (ref 1.6–8.3)
NEUTROPHILS NFR BLD AUTO: ABNORMAL %
NEUTROPHILS NFR BLD MANUAL: 94 %
NRBC # BLD AUTO: 0 10E3/UL
NRBC BLD AUTO-RTO: 0 /100
O2/TOTAL GAS SETTING VFR VENT: 40 %
O2/TOTAL GAS SETTING VFR VENT: 45 %
OXYHGB MFR BLDV: 68 % (ref 70–75)
OXYHGB MFR BLDV: 76 % (ref 70–75)
OXYHGB MFR BLDV: 79 % (ref 70–75)
OXYHGB MFR BLDV: 81 % (ref 70–75)
PCO2 BLDV: 83 MM HG (ref 40–50)
PCO2 BLDV: 95 MM HG (ref 40–50)
PCO2 BLDV: 96 MM HG (ref 40–50)
PCO2 BLDV: 99 MM HG (ref 40–50)
PH BLDV: 7.11 [PH] (ref 7.32–7.43)
PH BLDV: 7.13 [PH] (ref 7.32–7.43)
PH BLDV: 7.13 [PH] (ref 7.32–7.43)
PH BLDV: 7.18 [PH] (ref 7.32–7.43)
PHOSPHATE SERPL-MCNC: 6.8 MG/DL (ref 2.5–4.5)
PLAT MORPH BLD: ABNORMAL
PLATELET # BLD AUTO: 191 10E3/UL (ref 150–450)
PO2 BLDV: 38 MM HG (ref 25–47)
PO2 BLDV: 49 MM HG (ref 25–47)
PO2 BLDV: 51 MM HG (ref 25–47)
PO2 BLDV: 53 MM HG (ref 25–47)
POLYCHROMASIA BLD QL SMEAR: SLIGHT
POTASSIUM SERPL-SCNC: 4.3 MMOL/L (ref 3.4–5.3)
PROT SERPL-MCNC: 5.8 G/DL (ref 6.4–8.3)
RBC # BLD AUTO: 2.85 10E6/UL (ref 3.8–5.2)
RBC MORPH BLD: ABNORMAL
SAO2 % BLDV: 70.3 % (ref 70–75)
SAO2 % BLDV: 78.4 % (ref 70–75)
SAO2 % BLDV: 81.1 % (ref 70–75)
SAO2 % BLDV: 83.6 % (ref 70–75)
SODIUM SERPL-SCNC: 138 MMOL/L (ref 135–145)
TME LAST DOSE: NORMAL H
TME LAST DOSE: NORMAL H
WBC # BLD AUTO: 9.9 10E3/UL (ref 4–11)

## 2024-03-23 PROCEDURE — G1010 CDSM STANSON: HCPCS | Performed by: RADIOLOGY

## 2024-03-23 PROCEDURE — 250N000012 HC RX MED GY IP 250 OP 636 PS 637

## 2024-03-23 PROCEDURE — 86833 HLA CLASS II HIGH DEFIN QUAL: CPT | Performed by: INTERNAL MEDICINE

## 2024-03-23 PROCEDURE — 82805 BLOOD GASES W/O2 SATURATION: CPT

## 2024-03-23 PROCEDURE — 94640 AIRWAY INHALATION TREATMENT: CPT | Mod: 76

## 2024-03-23 PROCEDURE — 71045 X-RAY EXAM CHEST 1 VIEW: CPT

## 2024-03-23 PROCEDURE — 85610 PROTHROMBIN TIME: CPT | Performed by: STUDENT IN AN ORGANIZED HEALTH CARE EDUCATION/TRAINING PROGRAM

## 2024-03-23 PROCEDURE — 250N000013 HC RX MED GY IP 250 OP 250 PS 637

## 2024-03-23 PROCEDURE — 99233 SBSQ HOSP IP/OBS HIGH 50: CPT | Performed by: INTERNAL MEDICINE

## 2024-03-23 PROCEDURE — 258N000003 HC RX IP 258 OP 636: Performed by: INTERNAL MEDICINE

## 2024-03-23 PROCEDURE — 90935 HEMODIALYSIS ONE EVALUATION: CPT

## 2024-03-23 PROCEDURE — 250N000012 HC RX MED GY IP 250 OP 636 PS 637: Performed by: INTERNAL MEDICINE

## 2024-03-23 PROCEDURE — 120N000003 HC R&B IMCU UMMC

## 2024-03-23 PROCEDURE — 250N000009 HC RX 250

## 2024-03-23 PROCEDURE — 82805 BLOOD GASES W/O2 SATURATION: CPT | Performed by: INTERNAL MEDICINE

## 2024-03-23 PROCEDURE — 99233 SBSQ HOSP IP/OBS HIGH 50: CPT | Mod: GC | Performed by: INTERNAL MEDICINE

## 2024-03-23 PROCEDURE — 83735 ASSAY OF MAGNESIUM: CPT | Performed by: STUDENT IN AN ORGANIZED HEALTH CARE EDUCATION/TRAINING PROGRAM

## 2024-03-23 PROCEDURE — 250N000011 HC RX IP 250 OP 636: Performed by: STUDENT IN AN ORGANIZED HEALTH CARE EDUCATION/TRAINING PROGRAM

## 2024-03-23 PROCEDURE — 80053 COMPREHEN METABOLIC PANEL: CPT

## 2024-03-23 PROCEDURE — 85027 COMPLETE CBC AUTOMATED: CPT

## 2024-03-23 PROCEDURE — 85007 BL SMEAR W/DIFF WBC COUNT: CPT

## 2024-03-23 PROCEDURE — 250N000013 HC RX MED GY IP 250 OP 250 PS 637: Performed by: STUDENT IN AN ORGANIZED HEALTH CARE EDUCATION/TRAINING PROGRAM

## 2024-03-23 PROCEDURE — P9045 ALBUMIN (HUMAN), 5%, 250 ML: HCPCS | Mod: JZ | Performed by: STUDENT IN AN ORGANIZED HEALTH CARE EDUCATION/TRAINING PROGRAM

## 2024-03-23 PROCEDURE — 84100 ASSAY OF PHOSPHORUS: CPT | Performed by: STUDENT IN AN ORGANIZED HEALTH CARE EDUCATION/TRAINING PROGRAM

## 2024-03-23 PROCEDURE — 86832 HLA CLASS I HIGH DEFIN QUAL: CPT | Performed by: INTERNAL MEDICINE

## 2024-03-23 PROCEDURE — 94640 AIRWAY INHALATION TREATMENT: CPT

## 2024-03-23 PROCEDURE — 80158 DRUG ASSAY CYCLOSPORINE: CPT | Performed by: INTERNAL MEDICINE

## 2024-03-23 PROCEDURE — 999N000157 HC STATISTIC RCP TIME EA 10 MIN

## 2024-03-23 PROCEDURE — 71250 CT THORAX DX C-: CPT | Mod: 26 | Performed by: RADIOLOGY

## 2024-03-23 PROCEDURE — 250N000011 HC RX IP 250 OP 636: Mod: JZ | Performed by: STUDENT IN AN ORGANIZED HEALTH CARE EDUCATION/TRAINING PROGRAM

## 2024-03-23 PROCEDURE — 82805 BLOOD GASES W/O2 SATURATION: CPT | Performed by: STUDENT IN AN ORGANIZED HEALTH CARE EDUCATION/TRAINING PROGRAM

## 2024-03-23 PROCEDURE — 250N000011 HC RX IP 250 OP 636

## 2024-03-23 PROCEDURE — 71250 CT THORAX DX C-: CPT | Mod: MG

## 2024-03-23 PROCEDURE — 250N000011 HC RX IP 250 OP 636: Mod: JZ

## 2024-03-23 PROCEDURE — 258N000003 HC RX IP 258 OP 636

## 2024-03-23 PROCEDURE — 71045 X-RAY EXAM CHEST 1 VIEW: CPT | Mod: 26 | Performed by: RADIOLOGY

## 2024-03-23 PROCEDURE — 94660 CPAP INITIATION&MGMT: CPT

## 2024-03-23 PROCEDURE — 250N000011 HC RX IP 250 OP 636: Performed by: INTERNAL MEDICINE

## 2024-03-23 RX ORDER — CYCLOSPORINE 100 MG/ML
175 SOLUTION ORAL EVERY EVENING
Status: DISCONTINUED | OUTPATIENT
Start: 2024-03-23 | End: 2024-03-30

## 2024-03-23 RX ORDER — MIDODRINE HYDROCHLORIDE 5 MG/1
10 TABLET ORAL ONCE
Status: DISCONTINUED | OUTPATIENT
Start: 2024-03-23 | End: 2024-03-23

## 2024-03-23 RX ORDER — VANCOMYCIN HYDROCHLORIDE 1 G/200ML
1000 INJECTION, SOLUTION INTRAVENOUS ONCE
Qty: 200 ML | Refills: 0 | Status: COMPLETED | OUTPATIENT
Start: 2024-03-23 | End: 2024-03-24

## 2024-03-23 RX ORDER — PIPERACILLIN SODIUM, TAZOBACTAM SODIUM 2; .25 G/10ML; G/10ML
2.25 INJECTION, POWDER, LYOPHILIZED, FOR SOLUTION INTRAVENOUS EVERY 6 HOURS
Status: DISCONTINUED | OUTPATIENT
Start: 2024-03-23 | End: 2024-03-29

## 2024-03-23 RX ADMIN — CALCIUM CARBONATE 600 MG (1,500 MG)-VITAMIN D3 400 UNIT TABLET 1 TABLET: at 12:03

## 2024-03-23 RX ADMIN — LEVALBUTEROL HYDROCHLORIDE 1.25 MG: 1.25 SOLUTION RESPIRATORY (INHALATION) at 15:30

## 2024-03-23 RX ADMIN — HYDROXYZINE HYDROCHLORIDE 25 MG: 25 TABLET, FILM COATED ORAL at 21:46

## 2024-03-23 RX ADMIN — APIXABAN 2.5 MG: 2.5 TABLET, FILM COATED ORAL at 20:51

## 2024-03-23 RX ADMIN — AMIODARONE HYDROCHLORIDE 400 MG: 200 TABLET ORAL at 06:44

## 2024-03-23 RX ADMIN — CALCIUM CARBONATE 600 MG (1,500 MG)-VITAMIN D3 400 UNIT TABLET 1 TABLET: at 18:00

## 2024-03-23 RX ADMIN — INSULIN ASPART 1 UNITS: 100 INJECTION, SOLUTION INTRAVENOUS; SUBCUTANEOUS at 12:09

## 2024-03-23 RX ADMIN — Medication 40 MG: at 06:47

## 2024-03-23 RX ADMIN — LEVOTHYROXINE SODIUM 25 MCG: 0.03 TABLET ORAL at 06:44

## 2024-03-23 RX ADMIN — Medication 2.5 MCG: at 20:51

## 2024-03-23 RX ADMIN — OLANZAPINE 5 MG: 5 TABLET, ORALLY DISINTEGRATING ORAL at 21:54

## 2024-03-23 RX ADMIN — ALBUMIN HUMAN 12.5 G: 0.05 INJECTION, SOLUTION INTRAVENOUS at 09:14

## 2024-03-23 RX ADMIN — LEVALBUTEROL HYDROCHLORIDE 1.25 MG: 1.25 SOLUTION RESPIRATORY (INHALATION) at 12:38

## 2024-03-23 RX ADMIN — LIDOCAINE 4% 1 PATCH: 40 PATCH TOPICAL at 21:09

## 2024-03-23 RX ADMIN — MICAFUNGIN SODIUM 100 MG: 50 INJECTION, POWDER, LYOPHILIZED, FOR SOLUTION INTRAVENOUS at 21:46

## 2024-03-23 RX ADMIN — AMIODARONE HYDROCHLORIDE 400 MG: 200 TABLET ORAL at 20:48

## 2024-03-23 RX ADMIN — INSULIN ASPART 1 UNITS: 100 INJECTION, SOLUTION INTRAVENOUS; SUBCUTANEOUS at 00:12

## 2024-03-23 RX ADMIN — SODIUM CHLORIDE 250 ML: 9 INJECTION, SOLUTION INTRAVENOUS at 11:56

## 2024-03-23 RX ADMIN — PREDNISONE 5 MG: 5 TABLET ORAL at 06:45

## 2024-03-23 RX ADMIN — LORAZEPAM 0.25 MG: 2 INJECTION INTRAMUSCULAR; INTRAVENOUS at 04:24

## 2024-03-23 RX ADMIN — INSULIN ASPART 1 UNITS: 100 INJECTION, SOLUTION INTRAVENOUS; SUBCUTANEOUS at 03:24

## 2024-03-23 RX ADMIN — ACETYLCYSTEINE 2 ML: 100 SOLUTION ORAL; RESPIRATORY (INHALATION) at 12:38

## 2024-03-23 RX ADMIN — PREDNISONE 2.5 MG: 2.5 TABLET ORAL at 20:49

## 2024-03-23 RX ADMIN — VANCOMYCIN HYDROCHLORIDE 1000 MG: 1 INJECTION, SOLUTION INTRAVENOUS at 23:25

## 2024-03-23 RX ADMIN — INSULIN ASPART 1 UNITS: 100 INJECTION, SOLUTION INTRAVENOUS; SUBCUTANEOUS at 16:58

## 2024-03-23 RX ADMIN — PIPERACILLIN AND TAZOBACTAM 2.25 G: 2; .25 INJECTION, POWDER, FOR SOLUTION INTRAVENOUS at 20:48

## 2024-03-23 RX ADMIN — LEVALBUTEROL HYDROCHLORIDE 1.25 MG: 1.25 SOLUTION RESPIRATORY (INHALATION) at 21:58

## 2024-03-23 RX ADMIN — Medication 40 MG: at 20:56

## 2024-03-23 RX ADMIN — OLANZAPINE 5 MG: 5 TABLET, ORALLY DISINTEGRATING ORAL at 00:11

## 2024-03-23 RX ADMIN — ACETYLCYSTEINE 2 ML: 100 SOLUTION ORAL; RESPIRATORY (INHALATION) at 21:58

## 2024-03-23 RX ADMIN — APIXABAN 2.5 MG: 2.5 TABLET, FILM COATED ORAL at 06:45

## 2024-03-23 RX ADMIN — METOPROLOL TARTRATE 25 MG: 25 TABLET, FILM COATED ORAL at 20:51

## 2024-03-23 RX ADMIN — LIDOCAINE AND PRILOCAINE: 25; 25 CREAM TOPICAL at 06:51

## 2024-03-23 RX ADMIN — CYCLOSPORINE 200 MG: 100 SOLUTION ORAL at 12:03

## 2024-03-23 RX ADMIN — Medication 2.5 MCG: at 06:46

## 2024-03-23 RX ADMIN — ACETYLCYSTEINE 2 ML: 100 SOLUTION ORAL; RESPIRATORY (INHALATION) at 15:30

## 2024-03-23 RX ADMIN — SODIUM CHLORIDE 300 ML: 9 INJECTION, SOLUTION INTRAVENOUS at 11:56

## 2024-03-23 RX ADMIN — Medication 5 ML: at 14:04

## 2024-03-23 RX ADMIN — CYCLOSPORINE 175 MG: 100 SOLUTION ORAL at 21:45

## 2024-03-23 ASSESSMENT — ACTIVITIES OF DAILY LIVING (ADL)
ADLS_ACUITY_SCORE: 42

## 2024-03-23 NOTE — PROGRESS NOTES
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 On 2/17/24 she was transferred to ICU for worsening mental status and acute hypoxic and hypercarbic respiratory failure inspite of BiPAP requiring intubation. She was suspected to have PNA and started on antibiotics. Extubated on 2/19 without complications. Transitioned back to PO/TF from TPN. Developed progressively worsening respiratory acidosis and hypercarbia, and was re-admitted to ICU on 2/28/24 for close monitoring of intermittent BiPAP and possible intubation, however she improved on AVAPS setting and well enough to transfer back to medicine floor on 2/29/24. Transferred back to ICU 3/18 for recurrent hypercarbia with significant respiratory acidosis. Bicarb supplementation started, transferred to the floor 3/20. Goals include A. Flutter control and BiPAP adherence with control of anxiety/claustrophobia. GoC conversations ongoing.       Changes Today 03/23/2024  - Dialysis today, but unable to pull fluid due to BP  - pt having increased WOB and placed on BiPAP for the day/night (ok for breaks for  food). Increased O2 needs up to 10L oxymask likely due to volume overload  - CXR with increased white haziness b/l but most notable in R-lung field, likely fluid; lower suspicion for infection  - VBG 7.11/99/49/31 at noon; Recheck at 1700  -Will reach out to transplant pulm or pulm fellow     #Severe respiratory acidosis, improved on BiPAP  #Acute on chronic hypoxic<hypercarbic respiratory failure  #S/P Lung transplant 2022 c/b right hemidiaphragm palsy  #Recurrent pneumonia, resolved  #Mucous plugging  #Suspected CARLEE  Patient received a bilateral lung transplant 6/28/22 for COPD. Was admitted 2/10 to address malnutrition and admitted to ICU on 2/17 with hypoxic hypercarbic respiratory failure. Intubated on 2/18 AM  due to worsening oxygen requirements, likely due to pulmonary edema given hx of ESRD requiring HD vs infection given hx of lung transplant, immunosuppressed status, and recurrent pneumonias. Extubated on 2/19 without complications. Has had issues with rising pCO2 that is responsive to BiPAP, but due to refusal, has required transferred to ICU (on 2/28 AM). Neurology consulted and MRI r/o central cause problem for respiratory drive. Started on AVAPS with improvement in mental status and VBG, and patient transferred back to medicine floor on 2/29.  Required another brief ICU stay 3/18 - 3/20, but did not require intubation during this time. Issues with anxiety/claustrophobia while using the mask, Atarax has helped though Zyprexa led to hallucinations. Patient's baseline appears to be with pH mid 7.2s and pCO2  mid 70-80s at best. Even with pH this low, and pCO2 that high, mentation remains stable.   -Acutely worsening volume overload with inability to tolerate dialysis w/ fluid removal. CXR with increased b/l opacity (concerning for vol overload). Lower suspicion for infection. Attempted to get in touch w/ Transplant pulmonology.    -If pH<7.1 start bicarb as noted below, consider transfer to ICU and/or  reaching out to dialysis to attempt dialysis   - HOLD Oral bicarb 1300mg TID per Nephrology (ok to restart for pH < 7.1 ) + Cont high bicarb dialysis bath  - Continue BiPAP 16/5 with longer breaks on HFNC   - PRN hypertonic saline inhaler with albuterol nebs  - Continue secretion clearance: Xopenex + Mucomyst, acapella and CPT   - Low threshold for ABx  - Transplant pulmonology following, recs appreciated  - PTA immunosuppressive agent  >Prednisone 5 mg every morning, 2.5 mg every afternoon; Stop tacrolimus 3/10 and replaced with Cyclosporin 200mg BID   > overnight oximetry study suggestive of O2 vs CPAP need, as did require up to 2LPM overnight to prevent hypoxia  > Try to minimize O2 to preserve respiratory drive, will give IVIG for DSA+   - Stopped PTA dapsone MWF for PJP prophylaxis; replaced with Bactrim (3/11)    - Limit medications that would depress respiration, ok to trial low dose benzo  - D/c'd theophylline 3/3/24 due to difficulty attaining therapeutic levels (started by ICU), could consider Modafinil   - repeat metabolic CART study ordered by Transplant Pulm  - sleep clinic eval at discharge     Antibiotics:  Vancomycin (2/17 - 2/17)  Zosyn (2/17 - 2/23) for HAP  Bactrim MWF, PJP ppx (previously on Dapsone)  Micafungin (2/18- 2/21)  Fidaxomicin (3/13-3/22)     #A-fib, A-flutter (recurrent)  Noted atrial fibrillation on arrival with HR elevated at 150, not sustained for > 10 minutes and otherwise hemodynamically stable. RVR resolved w/o medications.  PTA metoprolol (25mg BID) has been held through much of admission. On 3/17 new Aflutter developed overnight. HR 150s with flutter waves on EKG. Metoprolol administered along with Mg infusion with minimal response. Patient was then given amiodarone bolus and placed on drip which slightly lowered her HR in 120-130s. This was held for borderline hypotension 3/18, but restarted again with amiodarone 3/19.   - MAP goal > 65 mmHg  - Metoprolol tartrate dose 25 mg  BID (hold if MAP<65)  - s/p Amio bolus and 24 hours infusion (stopped 3/21); Continue amiodarone 400mg BID PO  - Continuous telemetry     #Severe malnutrition   #FTT   #Hypoalbuminemia  #Gastroparesis, small bowel dysmotility  #S/P PEG/J with intolerance of enteral nutrition  #Chronic osmotic diarrhea/SIBO s/p Rifaximin  #Recurrent C.Diff (3rd ep)    #GERD  Patient with gastroparesis (presumed due to vagal injury) and small bowel dysmotility complicated by unintentional 40lb weight loss over the past year and now severe malnutrition. Previously intolerant to oral food intake due to nausea. Was initially admitted for portacath and TPN initiation since 2/13. After extubation has been able to tolerate feeds without n/v from 2/20.  Per GI, next steps for workup for malnutrition would include CT enterography to evaluate for anatomic abnormalities contributing to malnutrition vs possible treatment for SIBO (though patient has had multiple courses of treatment in the past with no long term improvement). Patient couldn't tolerate the oral load for CT enterography on 2/21, so will defer this until she is able to tolerate greater PO load. On 2/23 , again discussed with patient the need of small bowel motility study if not improving outpatient through [place]. No ongoing concerns for SIBO on 2/23 as patient is passing multiple episodes of stools and gas with no abdominal pain or distention. C-diff test negative on 2/21. Per RD, patient tolerating >300 kcal of intake PO currently, so stopped trickle Tube feeds and continuing with PO and TPN for nutrition (sufficient for preventing gut atrophy). As of 3/11 transplant pulmonology is worried that TPN is not a realistic plan to continue at home and would like to transition back to TF (RD oncerned pt is not absorbing any oral intake). Since transitioned off TPN. Transplant pulm aslo worried about mucosal toxicity. Repeat enteric studies showed recurrent C.diff;  Fidaxomicin x 10 days  started (GI approved). Transplant pulm requesting repeat colonoscopy w/ Bx after completion of c.diff treatment, to r/o toxicity or other reason that diarrhea is present.  - regular diet + Restarted tube feeds (with goal of 35 mL/hr) if doing well off BiPAP  - GI consulted/signed off; appreciate recs              -s/p PO Fidaxomicin 200 mg BID for 10 days (3/13-3/22) - diarrhea has improved since start of tx               -Consider reconsult GI next week, after  metabolic CART study, for future colonoscopy if diarrhea returns   - May benefit from small bowel motility study if not improving outpatient through [place].   - PPI BID  - PRN bowel regimen     #B/L Neuropathic Pain   New pain b/l over the past few days. Last A1c <4.2 (7/11/23). End stage renal dz can cause it too, TSH wnl. B12 elevated. B6 normal. Copper low (56.3 (), zinc 48.3 (mildly low).   -Consider gabapentin in the coming days   -Neuro Recs:  -No obvious clinical history or exam findings to suggest neurologic/neuromuscular causes of respiratory weakness  -Neuropathy labs currently pending  -HOLD B12 supplement (Supratheraputic on 3/15)  -Talk with pharmacy about copper and zinc replacement      #Acute on chronic anemia 2/2 ESRD  Hgb stable 7-8s, with no acute signs of bleeding. Aute drop 2/29 from 8.2 > 6.6 after two rechecks, no overt signs of bleeding; required 1U pRBC transfusion.  Stable.   - continue epogen per nephrology with dialysis  - venofer 50 mcg qweek with dialysis      #ESRD on HD  #Hypervolemic hyponatremia, resolved  #Anion gap metabolic acidosis, resolved  #Mild hyperphosphatemia  Patient is ESRD on T/Th/Sat HD as outpt, Hgb stabilizing. HD tolerating well. Briefly required extra Monday runs, not since being off TPN. She would prefer longer sessions to more days.   - CBC and CMP daily  - Strict I/Os  - Daily weight   - Increased bicarb bath  -Currently holding Sevelamer but may need to resume in the coming days per Nephrology.       # Hypothyroidism, possibly transient   Patient with new low T4 at 0.64 and TSH at 6.5, concerning for new hypothyroidism vs sick thyroid syndrome. TSH with appropriate response, more consistent with elevation in the setting of acute illness. ICU team on 2/28 recommended initiation of treatment for possible hypothyroid as this can improve respiratory muscle function in the setting of weakness and malnutrition.   - continue liothyronine and levothyroxine per ICU team recommendations     #Left upper extremity unilateral edema, improving   #Bilateral pedal and ankle edema, improving  Edema due to third spacing due to hypoalbuminemia vs HF. BNP >98674 at admission, Echo on 2/18 shows EF of 55-60% with collapsible IVC. Extremity edema 2/2 to hypoalbuminemia. Upper limb duplex USG on 2/18 negative for DVT.  USG left arm on 2/21 shows steel physiology and Vascular Surgery consulted (see below). Overall edema in extremities have improved significantly  - Continue daily weights  - Strict I/Os  - Elevation and wrapping of only lower legs as needed and increased UF per nephrology for volume management; no wrapping of Left upper extremity with dialysis fistula  - HD as noted above      #Steal physiology of LUE dialysis access fistula  LUE arterial US obtained 2/21 with concern for LUE edema. US of fistula demonstrated steal physiology. Discussed with nephrology, and vascular surgery consulted on 2/22. Vascular surgery has only minor concerns for steal symptoms and given that the AVF is working well, they are okay with outpatient fistulograms/ venoplasty with wrist brachial index and PPG's, unless new concerns arise.  - plan for outpatient workup as above; nephrology in agreement with outpatient workup.     # Right hip fracture s/p ORIF (December 2023)   - PT/OT     # Hx EBV viremia   # Hypogammaglobulinemia  # Chronic immunosuppression 2/2 lung transplant  Patient has been afebrile and has not had leukocytosis however she is  under immunosuppression. Given acute respiratory failure and hx of recurrent pneumonias, she was started on empiric abx for concerns over new pneumonia. RVP and cultures no growth to date.   - EBV 27K (decreased compared to prior)   - Did receive IVIG      #GOC  Care conference on 3/15 with Transplant Pulm, hospitals Care, Medicine, daughters (Julia Nash) and Transplant SW. Overall medical updates provided and Qs answered. With declining respiratory acidosis on labs, discussed code status 3/18. Patient initially not desiring any escalation of her care to ICU/intubation with frustration re overall course. However, after discussing with her daughter on the phone she and family elected to remain full code and agreed to ICU admission and intubation if necessary. Still full code  - Transplant pulm requesting care conference this week; will schedule for early next week           Diet: Adult Formula Drip Feeding: Continuous [employer] Renal Support; Jejunostomy; Goal Rate: 35 mL/hr (goal rate) held for 1 hour before/after Synthroid administration, per PharmD; mL/hr  Regular Diet Adult    DVT Prophylaxis: apixaban 2.5mg BID   Dong Catheter: Not present  Fluids: TF, PO  Lines: PRESENT      PICC 03/04/24 Double Lumen Right Basilic TPN. PICC okay to use.-Site Assessment: WDL  Hemodialysis Vascular Access Arteriovenous graft Superior Arm-Site Assessment: WDL      Cardiac Monitoring: ACTIVE order. Indication: Tachyarrhythmias, acute (48 hours)  Code Status: Full Code      Clinically Significant Risk Factors              # Hypoalbuminemia: Lowest albumin = 2.6 g/dL at 2/18/2024  5:13 AM, will monitor as appropriate             # Severe Malnutrition: based on nutrition assessment      # Financial/Environmental Concerns: none         Disposition Plan        The patient's care was discussed with the Attending Physician, Dr. Cameron  .     Betty Diaz, PGY4  Internal Medicine-Pediatrics  Pager: [phone]      Medicine  Service, NILE TEAM 1  [facility]  Securely message with [employer] (more info)  Text page via [employer] Paging/Directory   See signed in provider for up to date coverage information  ______________________________________________________________________    Interval History   Nursing notes reviewed. Still having afib 100-120; MAP 60s. On 10L Oxymask overnight. Was placed on BiPAP and then went on NC while at dialysis. Unable to pull fluid at dialysis due to BP. Pt gas significantly worse and CXR today shows vol overload.     Physical Exam   Vital Signs: Temp: 97.7  F (36.5  C) Temp src: Axillary BP: 115/65 Pulse: 108   Resp: 25 SpO2: 93 % O2 Device: Nasal cannula Oxygen Delivery: 6 LPM  Weight: 102 lbs 15.28 oz    General Appearance: Laying in bed, fatigued with bipap on.  Frail and thin   Respiratory: Increased RR to mids 20s on Bipap. Coarse breath sounds b/l.    Cardiovascular: Tachy, irregularly irregular rhythm  Skin: Scattered ecchymoses and superficial wounds in various stages of healing on the bilateral arms.  Lines in place as above.  Other: Alert and oriented.  Grossly normal sensorium.  No obvious focal deficits.  Word choice, behavior, eye contact all appropriate.     Medical Decision Making              Data     I have personally reviewed the following data over the past 24 hrs:    9.9  \   10.0 (L)   / 191     138 96 (L) 70.0 (H) /  160 (H)   4.3 29 4.13 (H) \     ALT: 9 AST: 12 AP: 139 TBILI: 0.3   ALB: 3.7 TOT PROTEIN: 5.8 (L) LIPASE: N/A     INR:  1.12 PTT:  N/A   D-dimer:  N/A Fibrinogen:  N/A

## 2024-03-23 NOTE — PLAN OF CARE
Neuro: A&Ox 4   Cardiac: A fib/ST. -120's. BP 's/50-60's.    Respiratory: Sating > 90% on BiPAP 40% FiO2. Patient refused BiPAP throughout the night, requiring increased O2 needs to 10-15L on Oxymask.   GI/: Anuric, patient on HD. BM X2.  Diet/appetite: Tolerating regular diet, on continuous TF @ 35mL/hr, 30mL FWF Q4. Patient did not eat on shift. Rayshawn counts. No N/V.  Activity:  Assist of 1 with GB and walker. Patent reported dizziness throughout shift.   Pain: Patient denies pain.  Skin: No new deficits noted.   LDA's: L arm fistula for HD. R DL PICC. PEG-J.    Plan: Continue with POC. Notify primary team with changes.    PRN atarax given x1  PRN ativan given x1    Patient to HD 3/23 @ 0740

## 2024-03-23 NOTE — PROVIDER NOTIFICATION
Pt arrived to floor from dialysis w/ no nurse transport, patient on 6L oxyplus mask and oxygen sats 77%. Charge RN increased oxygen to 15L to obtain O2 sats at 92%. Pt then placed on bipap at 45% FiO2, VBG drawn.     Dr. Diaz notified of critical VBG at 1242pm: pH 7.11 and co2 99. Pt to remain on bipap.     Chest xray obtained.   Repeat VBG at 1700.    Team to bedside at 1400. Will cont to follow poc and notify team with any changes.

## 2024-03-23 NOTE — PROGRESS NOTES
Nephrology Progress Note  03/23/2024         Assessment & Recommendations:   Sofie Rodriguez is a 63 year old year old female with ESKD, COPD s/p b/l lung transplant in 6/2022 with multiple complications, gastroparesis s/p GJ tube, GIB, chronic diarrhea, recurrent c-diff, FTT with inability to tolerate any tube feeds, R hip fx s/p ORIF 12/2023, admitted on 2/10 for initiation of TPN/lipids, transferred to ICU on 2/17 with worsening mental status and respiratory acidosis in spite of bipap, ultimately intubated on 2/18, being treated for PNA, also with volume overload in setting of high volume TPN. Persistent respiratory acidosis in spite of volume off, transferred to ICU for hypotension and respiratory failure, improved on bipap, now floor status again 3/1. Transferred to ICU 3/18 again with worsening hypercapnic respiratory failure.     # ESKD - TTS, LUE AVG, 3hr, 45.5 kg, [facility], Dr. Andres [place].  - Continue HD on TTS schedule   - Requires EMLA cream an hour before HD  - please avoid PICC which will compromise future dialysis accesses; a midline is much preferred for this patient    # Dialysis access: LUE AVG placed 3-4 months ago, per pt. She had arm swelling and a fistulogram a couple months ago and swelling improved but now has redeveloped.  - US with c/f possible steal syndrome  - per vascular surgery, no concern for steal syndrome, ok to go ahead with fistulogram  - given that AVF is working well on dialysis (450 BFR) and at this time IR is only able to perform fistulograms when AVF isn't working well, will defer to OP for management.     # Persistent respiratory acidosis: CO2 improved on bipap overnight  - difficulty tolerating bipap due to claustrophobia, but wearing this lately  - balancing act between giving bicarb to maintain pH in setting of severe persistent respiratory acidosis with bicarb quickly converting CO2 and worsening overall status  - recommend stopping PO bicarb, ok to restart  "for pH < 7.1 but would stop again once pH is 7.2; will continue to provide bicarb with dialysis  - transplant pulm following    # Aflutter: on amio and BB  # Volume/BP: Anuric; on metoprolol soln 25 mg bid   - weights are variable, unsure of accuracy; appears close to euvolemia with much improvement in LE edema  - CXR 3/18 with worsening pulm edema, had extra run on Monday for more fluid off  - Unable to take fluid off today, will try again when BP better     # Nutrition: on [employer] tube feeds     # Anemia 2/2 ESKD  On Venofer 50 qwk, Mircera last dose 1/9/2024  - hgb 9-10's  - Will continue Venofer 50 mcg q week (Tuesday)  - continue epogen 8000 units via HD    # BMD:   - Ca 8-9's, phos 6.8, alb 3.7  - sevelamer on hold      Recommendations were communicated to primary team via note     Ravin Johnston MD   Division of Renal Disease and Hypertension    Interval History :   Hypotension this AM.  Lab this morning show potassium 4.3 and bicarb 29.  BW 46.5 kg.  I have given her 100 mL   Of saline and 5% albumin to 50 mL hypotension to complete dialysis.  Did not do UF due to hypotension.    Review of Systems:   4 point ROS neg other than as noted above    Physical Exam:   I/O last 3 completed shifts:  In: 1395 [P.O.:420; NG/GT:450]  Out: 50 [Urine:50]   BP (!) 84/49   Pulse 115   Temp 98.2  F (36.8  C) (Oral)   Resp 24   Ht 1.57 m (5' 1.81\")   Wt 46.5 kg (102 lb 8.2 oz)   SpO2 93%   BMI 18.86 kg/m       GENERAL APPEARANCE: NAD  PULM: diminished  CV: RRR    trace pedal/ankle  GI: soft   INTEGUMENT: no cyanosis, no rashes on exposed skin  NEURO: a/o3  Access Left AVG     Labs:   All labs reviewed by me  Electrolytes/Renal -   Recent Labs   Lab Test 03/23/24  0427 03/23/24  0324 03/23/24  0003 03/22/24  0453 03/22/24  0447 03/21/24  0926 03/21/24  0443     --   --   --  137  --  135   POTASSIUM 4.3  --   --   --  3.7  --  3.9   CHLORIDE 96*  --   --   --  97*  --  94*   CO2 29  --   --   --  30*  --  30* "   BUN 70.0*  --   --   --  43.1*  --  56.4*   CR 4.13*  --   --   --  2.81*  --  3.37*   * 146* 143*   < > 136*   < > 125*   ESTUARDO 9.0  --   --   --  8.9  --  9.0   MAG 2.2  --   --   --  2.3  --  2.8*   PHOS 6.8*  --   --   --  4.9*  --  5.8*    < > = values in this interval not displayed.       CBC -   Recent Labs   Lab Test 03/23/24 0427 03/22/24 0447 03/21/24 0443   WBC 9.9 8.9 9.6   HGB 10.0* 10.0* 10.2*    180 191       LFTs -   Recent Labs   Lab Test 03/23/24 0427 03/22/24 0447 03/21/24 0443   ALKPHOS 139 131 137   BILITOTAL 0.3 0.3 0.3   ALT 9 9 11   AST 12 13 17   PROTTOTAL 5.8* 5.6* 5.6*   ALBUMIN 3.7 3.6 3.8       Iron Panel -   Recent Labs   Lab Test 09/26/22  0555 09/03/22  1039 08/24/22  0810   IRON 54 21* 41   IRONSAT 22 9* 21   CARLOS 769* 343* 334*         Imaging:  All imaging studies reviewed by me.     Current Medications:   acetylcysteine  2 mL Nebulization 4x daily    amiodarone  400 mg Oral BID    apixaban ANTICOAGULANT  2.5 mg Oral BID    calcium carbonate-vitamin D  1 tablet Per J Tube TID w/meals    [Held by provider] cyanocobalamin  500 mcg Per Feeding Tube Daily    cycloSPORINE modified  150 mg Per G Tube QPM    cycloSPORINE modified  200 mg Per Feeding Tube QAM    epoetin tre-epbx  8,000 Units Intravenous Once in dialysis/CRRT    heparin lock flush  5-20 mL Intracatheter Q24H    insulin aspart  1-4 Units Subcutaneous Q4H    levalbuterol  1.25 mg Nebulization 4x Daily    levothyroxine  25 mcg Oral QAM AC    lidocaine  1 patch Transdermal Q24h    liothyronine  2.5 mcg Oral BID    metoprolol tartrate  25 mg Per J Tube BID    - MEDICATION INSTRUCTIONS -   Does not apply Once    OLANZapine zydis  5 mg Oral At Bedtime    pantoprazole  40 mg Per J Tube BID    predniSONE  5 mg Per J Tube QAM    And    predniSONE  2.5 mg Per J Tube QPM    [Held by provider] sevelamer carbonate (RENVELA)  0.8 g Oral BID    [Held by provider] sodium bicarbonate  1,300 mg Oral or Feeding Tube TID     sodium chloride (PF)  10 mL Intracatheter Q8H    sodium chloride (PF)  10-40 mL Intracatheter Q8H    sodium chloride 0.9%  250 mL Intravenous Once in dialysis/CRRT    sodium chloride 0.9%  300 mL Hemodialysis Machine Once    sulfamethoxazole-trimethoprim  1 tablet Oral Q Mon Wed Fri AM      dextrose      - MEDICATION INSTRUCTIONS -      - MEDICATION INSTRUCTIONS -      sodium chloride 0.9%      - MEDICATION INSTRUCTIONS -       Ravin Johnston MD

## 2024-03-23 NOTE — PLAN OF CARE
"Goal Outcome Evaluation:    Neuro: A/Ox4. Pt endorses feeling more \"foggy\" this afternoon.   Cardiac: Afib, rates in the 90-110s. Soft bps this morning, team aware.    Respiratory: Sating >90% on bipap at 35% FiO2, NC at 4-6L while eating meds. Pt educated on importance of keeping continuous bipap on today d/t elevated CO2 levels (see previous note). Lungs more coarse. Chest xray completed.   GI/: Anuric. No bm this shift.  Diet/appetite: Tolerating regular diet. Poor appetite d/t resp status, inc WOB. TF at 35 ml/hr.   Activity:  Assist of 1, up to chair, however, stayed in bed today.  Pain: At acceptable level on current regimen.   Skin: No new deficits noted.   LDA's: right picc, left av fistula, g/j tube.     Plan: dialyzed today, unable to take fluid off d/t low BP.  Chest xray completed. VBG collected. Plan to continue on bipap and monitor VBG. Continue with POC. Notify primary team with changes.    "

## 2024-03-23 NOTE — PROGRESS NOTES
Date: 3/23/2024  Time: 1123      Data:  Pre Wt:   46.5 kg  Desired Wt:   To be established  Post Wt:  46.7 kg (estimated)  Weight change: 0.2 kg  Ultrafiltration - Post Run Net Total Removed (mL):  131 ml  Vascular Access Status: AVG patent  Dialyzer Rinse:  Light  Total Blood Volume Processed: 65.8 L   Total Dialysis (Treatment) Time:   3 Hrs  Dialysate Bath: K 3, Ca 2.25  Heparin: Heparin: None     Lab:   No  HbsAg - Nonreactive  (3/18/24)  HbsAb - 69.70 Immune (3/18/24)      Interventions:  Dialysis done through LUAVG using 15 gauge needles  UF off throughout tx d/t hypotension.   ,   Albumin administered per MAR (EPO sent to PCN by pharmacy)  See Flowsheet for Crit Profile throughout the run  Glucose checks done. Pre-HD: 126 mg/dl; Post-HD: 184 mg/dl  Treatment has ended safely and  blood is rinsed back completely  Decannulation done post HD, hemostasis is achieved in 10 minutes  Pressure dressing is applied, to be removed after 4-6 hours  Report given to PCN, sent back to 7210 room in stable condition     Assessment:  A/O x 4, calm and cooperative, denies pain  LUAVG arm has good thrill and bruit    BP did not support UF today. Provider Ravin Johnston notified and assessed pt at bedside. Albumin order obtained and administered, See MAR, BP parameters changed to SPB >80. Gentle UF at 500ml attempted but BP drop immediately. UF remained off for the remainder of tx. Completed 3 hour run, blood fully returned, decannulation, hemostasis achieved within 10 mins. EPO was sent to 7th floor by pharmacy. Martha CEE agreeable to administer when pt arrives post tx. Pt stable at time of transport back to room 6236. Post tx vitals - 90/57 (67) HR 93 SpO2 98% 6L. 7th floor charge RN notified writer that pt SpO2 dropped to 77. This was thought to be in transport however tx had ended 1 hour and 10 mins prior to stat drop. Pt insisted on adjusting oxymask and declined assistance from RN.                   Plan:    Per Renal team        MISAEL VillanuevaN, RN  Acute Dialysis RN  [facility]

## 2024-03-24 ENCOUNTER — APPOINTMENT (OUTPATIENT)
Dept: GENERAL RADIOLOGY | Facility: CLINIC | Age: 64
DRG: 640 | End: 2024-03-24
Attending: INTERNAL MEDICINE
Payer: MEDICARE

## 2024-03-24 ENCOUNTER — ANESTHESIA EVENT (OUTPATIENT)
Dept: INTENSIVE CARE | Facility: CLINIC | Age: 64
DRG: 640 | End: 2024-03-24
Payer: MEDICARE

## 2024-03-24 ENCOUNTER — ANESTHESIA (OUTPATIENT)
Dept: INTENSIVE CARE | Facility: CLINIC | Age: 64
DRG: 640 | End: 2024-03-24
Payer: MEDICARE

## 2024-03-24 LAB
ALBUMIN SERPL BCG-MCNC: 3.5 G/DL (ref 3.5–5.2)
ALP SERPL-CCNC: 134 U/L (ref 40–150)
ALT SERPL W P-5'-P-CCNC: 11 U/L (ref 0–50)
ANION GAP SERPL CALCULATED.3IONS-SCNC: 11 MMOL/L (ref 7–15)
ANION GAP SERPL CALCULATED.3IONS-SCNC: 11 MMOL/L (ref 7–15)
APPEARANCE FLD: ABNORMAL
AST SERPL W P-5'-P-CCNC: 16 U/L (ref 0–45)
BASE EXCESS BLDV CALC-SCNC: -0.2 MMOL/L (ref -3–3)
BASE EXCESS BLDV CALC-SCNC: -0.4 MMOL/L (ref -3–3)
BASE EXCESS BLDV CALC-SCNC: 0.9 MMOL/L (ref -3–3)
BASE EXCESS BLDV CALC-SCNC: 3.9 MMOL/L (ref -3–3)
BASE EXCESS BLDV CALC-SCNC: 6.1 MMOL/L (ref -3–3)
BASE EXCESS BLDV CALC-SCNC: 8.1 MMOL/L (ref -3–3)
BASE EXCESS BLDV CALC-SCNC: 8.4 MMOL/L (ref -3–3)
BASOPHILS # BLD AUTO: ABNORMAL 10*3/UL
BASOPHILS # BLD MANUAL: 0 10E3/UL (ref 0–0.2)
BASOPHILS NFR BLD AUTO: ABNORMAL %
BASOPHILS NFR BLD MANUAL: 0 %
BILIRUB SERPL-MCNC: 0.4 MG/DL
BRONCHOSCOPY: NORMAL
BUN SERPL-MCNC: 25.2 MG/DL (ref 8–23)
BUN SERPL-MCNC: 50 MG/DL (ref 8–23)
C PNEUM DNA SPEC QL NAA+PROBE: NOT DETECTED
CALCIUM SERPL-MCNC: 8.5 MG/DL (ref 8.8–10.2)
CALCIUM SERPL-MCNC: 8.9 MG/DL (ref 8.8–10.2)
CELL COUNT BODY FLUID SOURCE: ABNORMAL
CHLORIDE SERPL-SCNC: 96 MMOL/L (ref 98–107)
CHLORIDE SERPL-SCNC: 99 MMOL/L (ref 98–107)
COLOR FLD: ABNORMAL
CREAT SERPL-MCNC: 1.64 MG/DL (ref 0.51–0.95)
CREAT SERPL-MCNC: 2.93 MG/DL (ref 0.51–0.95)
DEPRECATED HCO3 PLAS-SCNC: 27 MMOL/L (ref 22–29)
DEPRECATED HCO3 PLAS-SCNC: 28 MMOL/L (ref 22–29)
EGFRCR SERPLBLD CKD-EPI 2021: 18 ML/MIN/1.73M2
EGFRCR SERPLBLD CKD-EPI 2021: 35 ML/MIN/1.73M2
EOSINOPHIL # BLD AUTO: ABNORMAL 10*3/UL
EOSINOPHIL # BLD MANUAL: 0.2 10E3/UL (ref 0–0.7)
EOSINOPHIL NFR BLD AUTO: ABNORMAL %
EOSINOPHIL NFR BLD MANUAL: 3 %
EOSINOPHIL NFR FLD MANUAL: 1 %
ERYTHROCYTE [DISTWIDTH] IN BLOOD BY AUTOMATED COUNT: 17.3 % (ref 10–15)
FLUAV H1 2009 PAND RNA SPEC QL NAA+PROBE: NOT DETECTED
FLUAV H1 RNA SPEC QL NAA+PROBE: NOT DETECTED
FLUAV H3 RNA SPEC QL NAA+PROBE: NOT DETECTED
FLUAV RNA SPEC QL NAA+PROBE: NOT DETECTED
FLUBV RNA SPEC QL NAA+PROBE: NOT DETECTED
GLUCOSE BLDC GLUCOMTR-MCNC: 104 MG/DL (ref 70–99)
GLUCOSE BLDC GLUCOMTR-MCNC: 116 MG/DL (ref 70–99)
GLUCOSE BLDC GLUCOMTR-MCNC: 121 MG/DL (ref 70–99)
GLUCOSE BLDC GLUCOMTR-MCNC: 123 MG/DL (ref 70–99)
GLUCOSE BLDC GLUCOMTR-MCNC: 125 MG/DL (ref 70–99)
GLUCOSE BLDC GLUCOMTR-MCNC: 164 MG/DL (ref 70–99)
GLUCOSE SERPL-MCNC: 106 MG/DL (ref 70–99)
GLUCOSE SERPL-MCNC: 156 MG/DL (ref 70–99)
GRAM STAIN RESULT: NORMAL
GRAM STAIN RESULT: NORMAL
HADV DNA SPEC QL NAA+PROBE: NOT DETECTED
HCO3 BLDV-SCNC: 29 MMOL/L (ref 21–28)
HCO3 BLDV-SCNC: 30 MMOL/L (ref 21–28)
HCO3 BLDV-SCNC: 31 MMOL/L (ref 21–28)
HCO3 BLDV-SCNC: 32 MMOL/L (ref 21–28)
HCO3 BLDV-SCNC: 32 MMOL/L (ref 21–28)
HCOV PNL SPEC NAA+PROBE: NOT DETECTED
HCT VFR BLD AUTO: 32.2 % (ref 35–47)
HGB BLD-MCNC: 9.1 G/DL (ref 11.7–15.7)
HMPV RNA SPEC QL NAA+PROBE: NOT DETECTED
HPIV1 RNA SPEC QL NAA+PROBE: NOT DETECTED
HPIV2 RNA SPEC QL NAA+PROBE: NOT DETECTED
HPIV3 RNA SPEC QL NAA+PROBE: NOT DETECTED
HPIV4 RNA SPEC QL NAA+PROBE: NOT DETECTED
IMM GRANULOCYTES # BLD: ABNORMAL 10*3/UL
IMM GRANULOCYTES NFR BLD: ABNORMAL %
INR PPP: 1.22 (ref 0.85–1.15)
KOH PREPARATION: NORMAL
KOH PREPARATION: NORMAL
LYMPHOCYTES # BLD AUTO: ABNORMAL 10*3/UL
LYMPHOCYTES # BLD MANUAL: 0.5 10E3/UL (ref 0.8–5.3)
LYMPHOCYTES NFR BLD AUTO: ABNORMAL %
LYMPHOCYTES NFR BLD MANUAL: 7 %
LYMPHOCYTES NFR FLD MANUAL: 5 %
M PNEUMO DNA SPEC QL NAA+PROBE: NOT DETECTED
MAGNESIUM SERPL-MCNC: 1.8 MG/DL (ref 1.7–2.3)
MCH RBC QN AUTO: 35 PG (ref 26.5–33)
MCHC RBC AUTO-ENTMCNC: 28.3 G/DL (ref 31.5–36.5)
MCV RBC AUTO: 124 FL (ref 78–100)
MONOCYTES # BLD AUTO: ABNORMAL 10*3/UL
MONOCYTES # BLD MANUAL: 0.8 10E3/UL (ref 0–1.3)
MONOCYTES NFR BLD AUTO: ABNORMAL %
MONOCYTES NFR BLD MANUAL: 10 %
MONOS+MACROS NFR FLD MANUAL: 19 %
MRSA DNA SPEC QL NAA+PROBE: NEGATIVE
NEUTROPHILS # BLD AUTO: ABNORMAL 10*3/UL
NEUTROPHILS # BLD MANUAL: 6.2 10E3/UL (ref 1.6–8.3)
NEUTROPHILS NFR BLD AUTO: ABNORMAL %
NEUTROPHILS NFR BLD MANUAL: 80 %
NEUTS BAND NFR FLD MANUAL: 75 %
NRBC # BLD AUTO: 0 10E3/UL
NRBC BLD AUTO-RTO: 0 /100
NT-PROBNP SERPL-MCNC: ABNORMAL PG/ML (ref 0–900)
O2/TOTAL GAS SETTING VFR VENT: 40 %
O2/TOTAL GAS SETTING VFR VENT: 45 %
O2/TOTAL GAS SETTING VFR VENT: 45 %
O2/TOTAL GAS SETTING VFR VENT: 50 %
O2/TOTAL GAS SETTING VFR VENT: 60 %
OXYHGB MFR BLDV: 63 % (ref 70–75)
OXYHGB MFR BLDV: 67 % (ref 70–75)
OXYHGB MFR BLDV: 70 % (ref 70–75)
OXYHGB MFR BLDV: 71 % (ref 70–75)
OXYHGB MFR BLDV: 73 % (ref 70–75)
OXYHGB MFR BLDV: 77 % (ref 70–75)
OXYHGB MFR BLDV: 78 % (ref 70–75)
PCO2 BLDV: 41 MM HG (ref 40–50)
PCO2 BLDV: 41 MM HG (ref 40–50)
PCO2 BLDV: 46 MM HG (ref 40–50)
PCO2 BLDV: 49 MM HG (ref 40–50)
PCO2 BLDV: 76 MM HG (ref 40–50)
PCO2 BLDV: 85 MM HG (ref 40–50)
PCO2 BLDV: 92 MM HG (ref 40–50)
PH BLDV: 7.13 [PH] (ref 7.32–7.43)
PH BLDV: 7.15 [PH] (ref 7.32–7.43)
PH BLDV: 7.2 [PH] (ref 7.32–7.43)
PH BLDV: 7.39 [PH] (ref 7.32–7.43)
PH BLDV: 7.44 [PH] (ref 7.32–7.43)
PH BLDV: 7.5 [PH] (ref 7.32–7.43)
PH BLDV: 7.5 [PH] (ref 7.32–7.43)
PHOSPHATE SERPL-MCNC: 4.9 MG/DL (ref 2.5–4.5)
PLAT MORPH BLD: ABNORMAL
PLATELET # BLD AUTO: 169 10E3/UL (ref 150–450)
PO2 BLDV: 28 MM HG (ref 25–47)
PO2 BLDV: 32 MM HG (ref 25–47)
PO2 BLDV: 34 MM HG (ref 25–47)
PO2 BLDV: 34 MM HG (ref 25–47)
PO2 BLDV: 45 MM HG (ref 25–47)
PO2 BLDV: 46 MM HG (ref 25–47)
PO2 BLDV: 53 MM HG (ref 25–47)
POLYCHROMASIA BLD QL SMEAR: SLIGHT
POTASSIUM SERPL-SCNC: 3.6 MMOL/L (ref 3.4–5.3)
POTASSIUM SERPL-SCNC: 4 MMOL/L (ref 3.4–5.3)
PROT SERPL-MCNC: 5.5 G/DL (ref 6.4–8.3)
RBC # BLD AUTO: 2.6 10E6/UL (ref 3.8–5.2)
RBC MORPH BLD: ABNORMAL
RSV RNA SPEC QL NAA+PROBE: NOT DETECTED
RSV RNA SPEC QL NAA+PROBE: NOT DETECTED
RV+EV RNA SPEC QL NAA+PROBE: NOT DETECTED
SA TARGET DNA: NEGATIVE
SAO2 % BLDV: 64.2 % (ref 70–75)
SAO2 % BLDV: 69.3 % (ref 70–75)
SAO2 % BLDV: 71.6 % (ref 70–75)
SAO2 % BLDV: 72.9 % (ref 70–75)
SAO2 % BLDV: 75.6 % (ref 70–75)
SAO2 % BLDV: 79 % (ref 70–75)
SAO2 % BLDV: 80.3 % (ref 70–75)
SODIUM SERPL-SCNC: 134 MMOL/L (ref 135–145)
SODIUM SERPL-SCNC: 138 MMOL/L (ref 135–145)
STOMATOCYTES BLD QL SMEAR: SLIGHT
VANCOMYCIN SERPL-MCNC: 14.3 UG/ML
WBC # BLD AUTO: 7.8 10E3/UL (ref 4–11)
WBC # FLD AUTO: 718 /UL

## 2024-03-24 PROCEDURE — 88108 CYTOPATH CONCENTRATE TECH: CPT | Mod: TC | Performed by: STUDENT IN AN ORGANIZED HEALTH CARE EDUCATION/TRAINING PROGRAM

## 2024-03-24 PROCEDURE — 86356 MONONUCLEAR CELL ANTIGEN: CPT | Performed by: STUDENT IN AN ORGANIZED HEALTH CARE EDUCATION/TRAINING PROGRAM

## 2024-03-24 PROCEDURE — 250N000011 HC RX IP 250 OP 636: Performed by: INTERNAL MEDICINE

## 2024-03-24 PROCEDURE — 82805 BLOOD GASES W/O2 SATURATION: CPT

## 2024-03-24 PROCEDURE — 87799 DETECT AGENT NOS DNA QUANT: CPT | Performed by: STUDENT IN AN ORGANIZED HEALTH CARE EDUCATION/TRAINING PROGRAM

## 2024-03-24 PROCEDURE — 258N000003 HC RX IP 258 OP 636: Performed by: INTERNAL MEDICINE

## 2024-03-24 PROCEDURE — 85007 BL SMEAR W/DIFF WBC COUNT: CPT

## 2024-03-24 PROCEDURE — 87529 HSV DNA AMP PROBE: CPT | Performed by: STUDENT IN AN ORGANIZED HEALTH CARE EDUCATION/TRAINING PROGRAM

## 2024-03-24 PROCEDURE — 94640 AIRWAY INHALATION TREATMENT: CPT | Mod: 76

## 2024-03-24 PROCEDURE — 250N000009 HC RX 250

## 2024-03-24 PROCEDURE — 87641 MR-STAPH DNA AMP PROBE: CPT | Performed by: STUDENT IN AN ORGANIZED HEALTH CARE EDUCATION/TRAINING PROGRAM

## 2024-03-24 PROCEDURE — 999N000128 HC STATISTIC PERIPHERAL IV START W/O US GUIDANCE

## 2024-03-24 PROCEDURE — 250N000009 HC RX 250: Performed by: STUDENT IN AN ORGANIZED HEALTH CARE EDUCATION/TRAINING PROGRAM

## 2024-03-24 PROCEDURE — 36415 COLL VENOUS BLD VENIPUNCTURE: CPT | Performed by: INTERNAL MEDICINE

## 2024-03-24 PROCEDURE — 80202 ASSAY OF VANCOMYCIN: CPT | Performed by: INTERNAL MEDICINE

## 2024-03-24 PROCEDURE — 250N000012 HC RX MED GY IP 250 OP 636 PS 637: Performed by: INTERNAL MEDICINE

## 2024-03-24 PROCEDURE — 250N000013 HC RX MED GY IP 250 OP 250 PS 637

## 2024-03-24 PROCEDURE — 87798 DETECT AGENT NOS DNA AMP: CPT | Performed by: STUDENT IN AN ORGANIZED HEALTH CARE EDUCATION/TRAINING PROGRAM

## 2024-03-24 PROCEDURE — 94799 UNLISTED PULMONARY SVC/PX: CPT

## 2024-03-24 PROCEDURE — 250N000011 HC RX IP 250 OP 636

## 2024-03-24 PROCEDURE — 71045 X-RAY EXAM CHEST 1 VIEW: CPT

## 2024-03-24 PROCEDURE — 87070 CULTURE OTHR SPECIMN AEROBIC: CPT | Performed by: STUDENT IN AN ORGANIZED HEALTH CARE EDUCATION/TRAINING PROGRAM

## 2024-03-24 PROCEDURE — 250N000012 HC RX MED GY IP 250 OP 636 PS 637

## 2024-03-24 PROCEDURE — 31624 DX BRONCHOSCOPE/LAVAGE: CPT

## 2024-03-24 PROCEDURE — 88312 SPECIAL STAINS GROUP 1: CPT | Mod: 26 | Performed by: PATHOLOGY

## 2024-03-24 PROCEDURE — 82805 BLOOD GASES W/O2 SATURATION: CPT | Performed by: STUDENT IN AN ORGANIZED HEALTH CARE EDUCATION/TRAINING PROGRAM

## 2024-03-24 PROCEDURE — 89050 BODY FLUID CELL COUNT: CPT | Performed by: STUDENT IN AN ORGANIZED HEALTH CARE EDUCATION/TRAINING PROGRAM

## 2024-03-24 PROCEDURE — 85027 COMPLETE CBC AUTOMATED: CPT

## 2024-03-24 PROCEDURE — 99233 SBSQ HOSP IP/OBS HIGH 50: CPT | Performed by: INTERNAL MEDICINE

## 2024-03-24 PROCEDURE — 71045 X-RAY EXAM CHEST 1 VIEW: CPT | Mod: 26 | Performed by: RADIOLOGY

## 2024-03-24 PROCEDURE — P9045 ALBUMIN (HUMAN), 5%, 250 ML: HCPCS | Mod: JZ | Performed by: INTERNAL MEDICINE

## 2024-03-24 PROCEDURE — 250N000013 HC RX MED GY IP 250 OP 250 PS 637: Performed by: STUDENT IN AN ORGANIZED HEALTH CARE EDUCATION/TRAINING PROGRAM

## 2024-03-24 PROCEDURE — 370N000003 HC ANESTHESIA WARD SERVICE: Performed by: ANESTHESIOLOGY

## 2024-03-24 PROCEDURE — 31500 INSERT EMERGENCY AIRWAY: CPT | Mod: GC | Performed by: ANESTHESIOLOGY

## 2024-03-24 PROCEDURE — 87205 SMEAR GRAM STAIN: CPT | Performed by: STUDENT IN AN ORGANIZED HEALTH CARE EDUCATION/TRAINING PROGRAM

## 2024-03-24 PROCEDURE — 87581 M.PNEUMON DNA AMP PROBE: CPT | Performed by: STUDENT IN AN ORGANIZED HEALTH CARE EDUCATION/TRAINING PROGRAM

## 2024-03-24 PROCEDURE — 31624 DX BRONCHOSCOPE/LAVAGE: CPT | Mod: GC | Performed by: INTERNAL MEDICINE

## 2024-03-24 PROCEDURE — 250N000011 HC RX IP 250 OP 636: Performed by: STUDENT IN AN ORGANIZED HEALTH CARE EDUCATION/TRAINING PROGRAM

## 2024-03-24 PROCEDURE — 0B9D8ZX DRAINAGE OF RIGHT MIDDLE LUNG LOBE, VIA NATURAL OR ARTIFICIAL OPENING ENDOSCOPIC, DIAGNOSTIC: ICD-10-PCS | Performed by: INTERNAL MEDICINE

## 2024-03-24 PROCEDURE — 99291 CRITICAL CARE FIRST HOUR: CPT | Mod: GC | Performed by: INTERNAL MEDICINE

## 2024-03-24 PROCEDURE — 84100 ASSAY OF PHOSPHORUS: CPT | Performed by: STUDENT IN AN ORGANIZED HEALTH CARE EDUCATION/TRAINING PROGRAM

## 2024-03-24 PROCEDURE — 87633 RESP VIRUS 12-25 TARGETS: CPT | Performed by: STUDENT IN AN ORGANIZED HEALTH CARE EDUCATION/TRAINING PROGRAM

## 2024-03-24 PROCEDURE — 99233 SBSQ HOSP IP/OBS HIGH 50: CPT | Mod: GC | Performed by: INTERNAL MEDICINE

## 2024-03-24 PROCEDURE — 5A1955Z RESPIRATORY VENTILATION, GREATER THAN 96 CONSECUTIVE HOURS: ICD-10-PCS | Performed by: INTERNAL MEDICINE

## 2024-03-24 PROCEDURE — 87102 FUNGUS ISOLATION CULTURE: CPT | Performed by: STUDENT IN AN ORGANIZED HEALTH CARE EDUCATION/TRAINING PROGRAM

## 2024-03-24 PROCEDURE — 94660 CPAP INITIATION&MGMT: CPT

## 2024-03-24 PROCEDURE — 94640 AIRWAY INHALATION TREATMENT: CPT

## 2024-03-24 PROCEDURE — 87305 ASPERGILLUS AG IA: CPT | Performed by: STUDENT IN AN ORGANIZED HEALTH CARE EDUCATION/TRAINING PROGRAM

## 2024-03-24 PROCEDURE — 87385 HISTOPLASMA CAPSUL AG IA: CPT | Performed by: STUDENT IN AN ORGANIZED HEALTH CARE EDUCATION/TRAINING PROGRAM

## 2024-03-24 PROCEDURE — 999N000157 HC STATISTIC RCP TIME EA 10 MIN

## 2024-03-24 PROCEDURE — 87206 SMEAR FLUORESCENT/ACID STAI: CPT | Performed by: STUDENT IN AN ORGANIZED HEALTH CARE EDUCATION/TRAINING PROGRAM

## 2024-03-24 PROCEDURE — 87040 BLOOD CULTURE FOR BACTERIA: CPT | Performed by: INTERNAL MEDICINE

## 2024-03-24 PROCEDURE — 94002 VENT MGMT INPAT INIT DAY: CPT

## 2024-03-24 PROCEDURE — 200N000002 HC R&B ICU UMMC

## 2024-03-24 PROCEDURE — 87116 MYCOBACTERIA CULTURE: CPT | Performed by: STUDENT IN AN ORGANIZED HEALTH CARE EDUCATION/TRAINING PROGRAM

## 2024-03-24 PROCEDURE — 87081 CULTURE SCREEN ONLY: CPT | Performed by: STUDENT IN AN ORGANIZED HEALTH CARE EDUCATION/TRAINING PROGRAM

## 2024-03-24 PROCEDURE — 250N000011 HC RX IP 250 OP 636: Mod: JZ | Performed by: INTERNAL MEDICINE

## 2024-03-24 PROCEDURE — 90937 HEMODIALYSIS REPEATED EVAL: CPT

## 2024-03-24 PROCEDURE — 83880 ASSAY OF NATRIURETIC PEPTIDE: CPT

## 2024-03-24 PROCEDURE — 80053 COMPREHEN METABOLIC PANEL: CPT

## 2024-03-24 PROCEDURE — 83735 ASSAY OF MAGNESIUM: CPT | Performed by: STUDENT IN AN ORGANIZED HEALTH CARE EDUCATION/TRAINING PROGRAM

## 2024-03-24 PROCEDURE — 85610 PROTHROMBIN TIME: CPT | Performed by: STUDENT IN AN ORGANIZED HEALTH CARE EDUCATION/TRAINING PROGRAM

## 2024-03-24 PROCEDURE — 87210 SMEAR WET MOUNT SALINE/INK: CPT | Performed by: STUDENT IN AN ORGANIZED HEALTH CARE EDUCATION/TRAINING PROGRAM

## 2024-03-24 PROCEDURE — 88108 CYTOPATH CONCENTRATE TECH: CPT | Mod: 26 | Performed by: PATHOLOGY

## 2024-03-24 RX ORDER — CHLORHEXIDINE GLUCONATE ORAL RINSE 1.2 MG/ML
15 SOLUTION DENTAL EVERY 12 HOURS
Status: DISCONTINUED | OUTPATIENT
Start: 2024-03-24 | End: 2024-04-03

## 2024-03-24 RX ORDER — NOREPINEPHRINE BITARTRATE 0.06 MG/ML
INJECTION, SOLUTION INTRAVENOUS
Status: COMPLETED
Start: 2024-03-24 | End: 2024-03-24

## 2024-03-24 RX ORDER — PROPOFOL 10 MG/ML
INJECTION, EMULSION INTRAVENOUS
Status: COMPLETED | OUTPATIENT
Start: 2024-03-24 | End: 2024-03-24

## 2024-03-24 RX ORDER — PROPOFOL 10 MG/ML
5-75 INJECTION, EMULSION INTRAVENOUS CONTINUOUS
Status: DISCONTINUED | OUTPATIENT
Start: 2024-03-24 | End: 2024-03-25

## 2024-03-24 RX ORDER — NOREPINEPHRINE BITARTRATE 0.06 MG/ML
.01-.6 INJECTION, SOLUTION INTRAVENOUS CONTINUOUS
Status: DISCONTINUED | OUTPATIENT
Start: 2024-03-24 | End: 2024-03-30

## 2024-03-24 RX ORDER — NICOTINE POLACRILEX 4 MG
15-30 LOZENGE BUCCAL
Status: DISCONTINUED | OUTPATIENT
Start: 2024-03-24 | End: 2024-04-15 | Stop reason: HOSPADM

## 2024-03-24 RX ORDER — MIDODRINE HYDROCHLORIDE 5 MG/1
10 TABLET ORAL ONCE
Status: COMPLETED | OUTPATIENT
Start: 2024-03-24 | End: 2024-03-24

## 2024-03-24 RX ORDER — DEXTROSE MONOHYDRATE 25 G/50ML
25-50 INJECTION, SOLUTION INTRAVENOUS
Status: DISCONTINUED | OUTPATIENT
Start: 2024-03-24 | End: 2024-04-15 | Stop reason: HOSPADM

## 2024-03-24 RX ORDER — MIDODRINE HYDROCHLORIDE 5 MG/1
10 TABLET ORAL
Status: DISCONTINUED | OUTPATIENT
Start: 2024-03-24 | End: 2024-03-25

## 2024-03-24 RX ORDER — PROPOFOL 10 MG/ML
INJECTION, EMULSION INTRAVENOUS
Status: COMPLETED
Start: 2024-03-24 | End: 2024-03-24

## 2024-03-24 RX ADMIN — ACETYLCYSTEINE 2 ML: 100 SOLUTION ORAL; RESPIRATORY (INHALATION) at 12:55

## 2024-03-24 RX ADMIN — LORAZEPAM 0.25 MG: 2 INJECTION INTRAMUSCULAR; INTRAVENOUS at 05:28

## 2024-03-24 RX ADMIN — NOREPINEPHRINE BITARTRATE 0.03 MCG/KG/MIN: 0.06 INJECTION, SOLUTION INTRAVENOUS at 13:28

## 2024-03-24 RX ADMIN — PROPOFOL 10 MCG/KG/MIN: 10 INJECTION, EMULSION INTRAVENOUS at 13:03

## 2024-03-24 RX ADMIN — Medication 50 MCG/HR: at 13:38

## 2024-03-24 RX ADMIN — CYCLOSPORINE 175 MG: 100 SOLUTION ORAL at 19:42

## 2024-03-24 RX ADMIN — CALCIUM CARBONATE 600 MG (1,500 MG)-VITAMIN D3 400 UNIT TABLET 1 TABLET: at 12:38

## 2024-03-24 RX ADMIN — MIDODRINE HYDROCHLORIDE 10 MG: 5 TABLET ORAL at 11:30

## 2024-03-24 RX ADMIN — PIPERACILLIN AND TAZOBACTAM 2.25 G: 2; .25 INJECTION, POWDER, FOR SOLUTION INTRAVENOUS at 09:26

## 2024-03-24 RX ADMIN — CALCIUM CARBONATE 600 MG (1,500 MG)-VITAMIN D3 400 UNIT TABLET 1 TABLET: at 09:17

## 2024-03-24 RX ADMIN — AMIODARONE HYDROCHLORIDE 400 MG: 200 TABLET ORAL at 09:13

## 2024-03-24 RX ADMIN — ACETYLCYSTEINE 2 ML: 100 SOLUTION ORAL; RESPIRATORY (INHALATION) at 17:12

## 2024-03-24 RX ADMIN — ACETYLCYSTEINE 2 ML: 100 SOLUTION ORAL; RESPIRATORY (INHALATION) at 20:06

## 2024-03-24 RX ADMIN — CYCLOSPORINE 200 MG: 100 SOLUTION ORAL at 09:21

## 2024-03-24 RX ADMIN — PREDNISONE 5 MG: 5 TABLET ORAL at 09:13

## 2024-03-24 RX ADMIN — LIDOCAINE 4% 1 PATCH: 40 PATCH TOPICAL at 19:38

## 2024-03-24 RX ADMIN — CALCIUM CARBONATE 600 MG (1,500 MG)-VITAMIN D3 400 UNIT TABLET 1 TABLET: at 18:28

## 2024-03-24 RX ADMIN — Medication 25 MCG: at 13:42

## 2024-03-24 RX ADMIN — OLANZAPINE 5 MG: 5 TABLET, ORALLY DISINTEGRATING ORAL at 22:10

## 2024-03-24 RX ADMIN — Medication 40 MG: at 19:42

## 2024-03-24 RX ADMIN — PREDNISONE 2.5 MG: 2.5 TABLET ORAL at 19:37

## 2024-03-24 RX ADMIN — LEVALBUTEROL HYDROCHLORIDE 1.25 MG: 1.25 SOLUTION RESPIRATORY (INHALATION) at 08:53

## 2024-03-24 RX ADMIN — METOPROLOL TARTRATE 25 MG: 25 TABLET, FILM COATED ORAL at 19:37

## 2024-03-24 RX ADMIN — LEVALBUTEROL HYDROCHLORIDE 1.25 MG: 1.25 SOLUTION RESPIRATORY (INHALATION) at 12:55

## 2024-03-24 RX ADMIN — SODIUM CHLORIDE 300 ML: 9 INJECTION, SOLUTION INTRAVENOUS at 15:20

## 2024-03-24 RX ADMIN — CHLORHEXIDINE GLUCONATE 0.12% ORAL RINSE 15 ML: 1.2 LIQUID ORAL at 19:37

## 2024-03-24 RX ADMIN — ROCURONIUM BROMIDE 40 MG: 10 INJECTION, SOLUTION INTRAVENOUS at 13:30

## 2024-03-24 RX ADMIN — PIPERACILLIN AND TAZOBACTAM 2.25 G: 2; .25 INJECTION, POWDER, FOR SOLUTION INTRAVENOUS at 19:37

## 2024-03-24 RX ADMIN — APIXABAN 2.5 MG: 2.5 TABLET, FILM COATED ORAL at 09:13

## 2024-03-24 RX ADMIN — Medication 40 MG: at 09:20

## 2024-03-24 RX ADMIN — AMIODARONE HYDROCHLORIDE 400 MG: 200 TABLET ORAL at 19:37

## 2024-03-24 RX ADMIN — SODIUM CHLORIDE 250 ML: 9 INJECTION, SOLUTION INTRAVENOUS at 15:20

## 2024-03-24 RX ADMIN — VANCOMYCIN HYDROCHLORIDE 750 MG: 1 INJECTION, POWDER, LYOPHILIZED, FOR SOLUTION INTRAVENOUS at 19:37

## 2024-03-24 RX ADMIN — METOPROLOL TARTRATE 25 MG: 25 TABLET, FILM COATED ORAL at 09:13

## 2024-03-24 RX ADMIN — INSULIN ASPART 1 UNITS: 100 INJECTION, SOLUTION INTRAVENOUS; SUBCUTANEOUS at 04:25

## 2024-03-24 RX ADMIN — ALBUMIN HUMAN 250 ML: 0.05 INJECTION, SOLUTION INTRAVENOUS at 15:21

## 2024-03-24 RX ADMIN — PROPOFOL 30 MCG/KG/MIN: 10 INJECTION, EMULSION INTRAVENOUS at 22:10

## 2024-03-24 RX ADMIN — LEVALBUTEROL HYDROCHLORIDE 1.25 MG: 1.25 SOLUTION RESPIRATORY (INHALATION) at 20:06

## 2024-03-24 RX ADMIN — LEVALBUTEROL HYDROCHLORIDE 1.25 MG: 1.25 SOLUTION RESPIRATORY (INHALATION) at 17:12

## 2024-03-24 RX ADMIN — LIDOCAINE AND PRILOCAINE: 25; 25 CREAM TOPICAL at 12:14

## 2024-03-24 RX ADMIN — PROPOFOL 60 MG: 10 INJECTION, EMULSION INTRAVENOUS at 13:30

## 2024-03-24 RX ADMIN — ACETYLCYSTEINE 2 ML: 100 SOLUTION ORAL; RESPIRATORY (INHALATION) at 08:53

## 2024-03-24 RX ADMIN — Medication 2.5 MCG: at 09:29

## 2024-03-24 RX ADMIN — Medication 2.5 MCG: at 19:41

## 2024-03-24 RX ADMIN — PIPERACILLIN AND TAZOBACTAM 2.25 G: 2; .25 INJECTION, POWDER, FOR SOLUTION INTRAVENOUS at 13:04

## 2024-03-24 RX ADMIN — PIPERACILLIN AND TAZOBACTAM 2.25 G: 2; .25 INJECTION, POWDER, FOR SOLUTION INTRAVENOUS at 04:24

## 2024-03-24 RX ADMIN — LEVOTHYROXINE SODIUM 25 MCG: 0.03 TABLET ORAL at 09:01

## 2024-03-24 RX ADMIN — APIXABAN 2.5 MG: 2.5 TABLET, FILM COATED ORAL at 19:37

## 2024-03-24 RX ADMIN — Medication: at 15:21

## 2024-03-24 ASSESSMENT — ACTIVITIES OF DAILY LIVING (ADL)
ADLS_ACUITY_SCORE: 36
ADLS_ACUITY_SCORE: 34
ADLS_ACUITY_SCORE: 39
ADLS_ACUITY_SCORE: 34
ADLS_ACUITY_SCORE: 36
ADLS_ACUITY_SCORE: 39
ADLS_ACUITY_SCORE: 34
ADLS_ACUITY_SCORE: 36
ADLS_ACUITY_SCORE: 42
ADLS_ACUITY_SCORE: 36
ADLS_ACUITY_SCORE: 34
ADLS_ACUITY_SCORE: 34

## 2024-03-24 NOTE — PROGRESS NOTES
Admitted/transferred from: 6B  Reason for admission/transfer: intubation  2 RN skin assessment: completed by Brunilda KHAN and Steph ZAPATA  Result of skin assessment and interventions/actions: blanchable redness on coccyx-sacral mepilex drsg changed  Height, weight, drug calc weight: Done  Patient belongings (see Flowsheet): walker, clothing, food, glasses, phone  MDRO education added to care plan: N/A  ?

## 2024-03-24 NOTE — PROGRESS NOTES
"Bronchoscopy Risk Assessment Guidelines      A. Patient symptoms to consider when assessing pulmonary TB risk are:    I. Cough greater than 3 weeks; and fever, hemoptysis, pleuritic chest    pain, weight loss greater than 10 lbs, night sweats, fatigue, infiltrates on    upper lobes or superior segments of lower lobes, cavitation on chest    x-ray.   B. Patient risk factors to consider when assessing pulmonary TB risk are:    I. Exposure to known TB case, foreign-born persons (within 5 years of    arrival to [place]), residence in a crowded setting (correctional facility,     long-term care center, etc.), persons with HIV or immunosuppression.    Patients with symptoms and risk factors should generally be considered \"suspect risk\" and bronchoscopies should be performed in airborne precautions.    This patient has NO KNOWN RISK of Tuberculosis (proceed with bronchoscopy)    Specimens sent: yes  Complications: None  Scope used: #[id number]  Slim  Attending Physician: Dr. Arabella Whitmore, RT on 3/24/2024 at 3:07 PM   "

## 2024-03-24 NOTE — PROGRESS NOTES
Brief [place] [facility] Cover Note    Evaluated patient at bedside given VBG 7.13/96. Hypercarbic respiratory failure has been gradually worsening throughout admission, but CO2 has been between 70-80s in recent days. Earlier today, was on 4L NC and intermittently on BiPAP. Patient notes ongoing dry cough, otherwise no particular concerns. Breathing feels comfortable and tolerating BiPAP. Afebrile, RR in mid 20s. Mentating normally; A&O x3. Coarse breath sounds bilaterally w/o increased WOB. Repeated VBG after patient was on BiPAP 16/5 for 45 minutes; VBG nearly unchanged at 7.13/95. CT chest w/ worsening scattered bilateral opacities; no pleural effusions.     May have component of pulmonary edema given ESRD w/ inability to pull fluid during dialysis today, but superimposed infxn is also possible (though WBC 9.9 and afebrile). Transplant pulmonology also considering ACR and [facility]. Resumed broad-spectrum abx (vanc, pip-tazo, 1x micafungin) for potential HAP. Sputum cx ordered if able to be collected. Discussed w/ RT regarding increasing IPAP vs switching to AVAPS to optimize ventilation. Trialed AVAPS, though tidal volumes dropped to 200s. Increased IPAP from 16 to 20. VBG improved to 7.18/83. Plan to keep BiPAP on overnight. Requested that RN call if any change in mentation develops.    Ct Garza MD  Internal Medicine, PGY-2

## 2024-03-24 NOTE — PROGRESS NOTES
Nephrology Progress Note  03/24/2024         Assessment & Recommendations:   Sofie Rodriguez is a 63 year old year old female with ESKD, COPD s/p b/l lung transplant in 6/2022 with multiple complications, gastroparesis s/p GJ tube, GIB, chronic diarrhea, recurrent c-diff, FTT with inability to tolerate any tube feeds, R hip fx s/p ORIF 12/2023, admitted on 2/10 for initiation of TPN/lipids, transferred to ICU on 2/17 with worsening mental status and respiratory acidosis in spite of bipap, ultimately intubated on 2/18, being treated for PNA, also with volume overload in setting of high volume TPN. Persistent respiratory acidosis in spite of volume off, transferred to ICU for hypotension and respiratory failure, improved on bipap, now floor status again 3/1. Transferred to ICU 3/18 again with worsening hypercapnic respiratory failure.     # ESKD - TTS, LUE AVG, 3hr, 45.5 kg, [facility], Dr. Andres Fairbanks.  - Continue HD on TTS schedule   - Will try UF only today and plan for 2L using midodrine pre and prn during HD and albumin prime, if not tolerable will do CRRT  - Requires EMLA cream an hour before HD  - please avoid PICC which will compromise future dialysis accesses; a midline is much preferred for this patient    # Dialysis access: LUE AVG placed 3-4 months ago, per pt. She had arm swelling and a fistulogram a couple months ago and swelling improved but now has redeveloped.  - US with c/f possible steal syndrome  - per vascular surgery, no concern for steal syndrome, ok to go ahead with fistulogram  - given that AVF is working well on dialysis (450 BFR) and at this time IR is only able to perform fistulograms when AVF isn't working well, will defer to OP for management.     # Persistent respiratory acidosis: CO2 improved on bipap overnight  - difficulty tolerating bipap due to claustrophobia, but wearing this lately  - balancing act between giving bicarb to maintain pH in setting of severe persistent  "respiratory acidosis with bicarb quickly converting CO2 and worsening overall status  - recommend stopping PO bicarb, ok to restart for pH < 7.1 but would stop again once pH is 7.2; will continue to provide bicarb with dialysis  - transplant pulm following    # Aflutter: on amio and BB  # Volume/BP: Anuric; on metoprolol soln 25 mg bid   - weights are variable, unsure of accuracy; appears close to euvolemia with much improvement in LE edema  - CXR 3/18 with worsening pulm edema, had extra run on Monday for more fluid off  - Unable to take fluid off today, will try again when BP better     # Nutrition: on [employer] tube feeds     # Anemia 2/2 ESKD  On Venofer 50 qwk, Mircera last dose 1/9/2024  - hgb 9-10's  - Will continue Venofer 50 mcg q week (Tuesday)  - continue epogen 8000 units via HD    # BMD:   - Ca 8-9's, phos 6.8, alb 3.7  - sevelamer on hold      Recommendations were communicated to primary team via note     Ravin Johnston MD   Division of Renal Disease and Hypertension    Interval History :   HD yesterday without fluid pull due to hypotension. RS worsens and underwent CT chest concerning for infection and possible fluid. BP 95/60 mmHg this AM. So will try UF today 1-2 L over 3.5 hours. If not tolerate, she may need CRRT. Will do albumin prime and giving midodrine 10 before and during run prn.     Review of Systems:   4 point ROS neg other than as noted above    Physical Exam:   I/O last 3 completed shifts:  In: 625 [P.O.:120; NG/GT:120]  Out: 0    BP 95/60 (BP Location: Right arm)   Pulse 106   Temp 97  F (36.1  C) (Oral)   Resp 26   Ht 1.57 m (5' 1.81\")   Wt 47.2 kg (104 lb 0.9 oz)   SpO2 95%   BMI 19.15 kg/m       GENERAL APPEARANCE: Respiratory distress.   PULM: crackles anteriorly bilaterally  CV: RRR    trace pedal/ankle  GI: soft   INTEGUMENT: no cyanosis, no rashes on exposed skin  NEURO: a/o3  Access Left AVG     Labs:   All labs reviewed by me  Electrolytes/Renal -   Recent Labs   Lab " Test 03/24/24 0434 03/24/24 0422 03/23/24  2331 03/23/24  0752 03/23/24 0427 03/22/24 0453 03/22/24 0447   *  --   --   --  138  --  137   POTASSIUM 4.0  --   --   --  4.3  --  3.7   CHLORIDE 96*  --   --   --  96*  --  97*   CO2 27  --   --   --  29  --  30*   BUN 50.0*  --   --   --  70.0*  --  43.1*   CR 2.93*  --   --   --  4.13*  --  2.81*   * 164* 107*   < > 161*   < > 136*   ESTUARDO 8.9  --   --   --  9.0  --  8.9   MAG 1.8  --   --   --  2.2  --  2.3   PHOS 4.9*  --   --   --  6.8*  --  4.9*    < > = values in this interval not displayed.       CBC -   Recent Labs   Lab Test 03/24/24 0434 03/23/24 0427 03/22/24 0447   WBC 7.8 9.9 8.9   HGB 9.1* 10.0* 10.0*    191 180       LFTs -   Recent Labs   Lab Test 03/24/24 0434 03/23/24 0427 03/22/24 0447   ALKPHOS 134 139 131   BILITOTAL 0.4 0.3 0.3   ALT 11 9 9   AST 16 12 13   PROTTOTAL 5.5* 5.8* 5.6*   ALBUMIN 3.5 3.7 3.6       Iron Panel -   Recent Labs   Lab Test 09/26/22  0555 09/03/22  1039 08/24/22  0810   IRON 54 21* 41   IRONSAT 22 9* 21   CARLOS 769* 343* 334*         Imaging:  All imaging studies reviewed by me.     Current Medications:   acetylcysteine  2 mL Nebulization 4x daily    amiodarone  400 mg Oral BID    apixaban ANTICOAGULANT  2.5 mg Oral BID    calcium carbonate-vitamin D  1 tablet Per J Tube TID w/meals    [Held by provider] cyanocobalamin  500 mcg Per Feeding Tube Daily    cycloSPORINE modified  175 mg Per G Tube QPM    cycloSPORINE modified  200 mg Per Feeding Tube QAM    heparin lock flush  5-20 mL Intracatheter Q24H    insulin aspart  1-4 Units Subcutaneous Q4H    levalbuterol  1.25 mg Nebulization 4x Daily    levothyroxine  25 mcg Oral QAM AC    lidocaine  1 patch Transdermal Q24h    liothyronine  2.5 mcg Oral BID    metoprolol tartrate  25 mg Per J Tube BID    midodrine  10 mg Oral Once    OLANZapine zydis  5 mg Oral At Bedtime    pantoprazole  40 mg Per J Tube BID    piperacillin-tazobactam  2.25 g Intravenous  Q6H    predniSONE  5 mg Per J Tube QAM    And    predniSONE  2.5 mg Per J Tube QPM    [Held by provider] sevelamer carbonate (RENVELA)  0.8 g Oral BID    [Held by provider] sodium bicarbonate  1,300 mg Oral or Feeding Tube TID    sodium chloride (PF)  10 mL Intracatheter Q8H    sodium chloride (PF)  10-40 mL Intracatheter Q8H    sulfamethoxazole-trimethoprim  1 tablet Oral Q Mon Wed Fri AM    vancomycin place blake - receiving intermittent dosing  1 each Intravenous See Admin Instructions      dextrose      - MEDICATION INSTRUCTIONS -      - MEDICATION INSTRUCTIONS -      sodium chloride 0.9%       Ravin Johnston MD

## 2024-03-24 NOTE — PHARMACY-VANCOMYCIN DOSING SERVICE
"Pharmacy Vancomycin Initial Note  Date of Service 2024  Patient's  1960  63 year old, female    Indication: [facility] Pneumonia    Current estimated CrCl = Estimated Creatinine Clearance: 10.5 mL/min (A) (based on SCr of 4.13 mg/dL (H)).    Creatinine for last 3 days  3/21/2024:  4:43 AM Creatinine 3.37 mg/dL  3/22/2024:  4:47 AM Creatinine 2.81 mg/dL  3/23/2024:  4:27 AM Creatinine 4.13 mg/dL    Recent Vancomycin Level(s) for last 3 days  No results found for requested labs within last 3 days.      Vancomycin IV Administrations (past 72 hours)        No vancomycin orders with administrations in past 72 hours.                    Nephrotoxins and other renal medications (From now, onward)      Start     Dose/Rate Route Frequency Ordered Stop    24  vancomycin (VANCOCIN) 1,000 mg in 200 mL dextrose intermittent infusion         1,000 mg  200 mL/hr over 1 Hours Intravenous ONCE 24  vancomycin place blake - receiving intermittent dosing         1 each Intravenous SEE ADMIN INSTRUCTIONS 24  cycloSPORINE modified (GENERIC EQUIVALENT) microemulsion solution 175 mg         175 mg Per G Tube EVERY EVENING 24 1810      24  piperacillin-tazobactam (ZOSYN) 2.25 g vial to attach to  ml bag        Note to Pharmacy: For [facility], [facility] and [facility]: For Zosyn-naive patients, use the \"Zosyn initial dose + extended infusion\" order panel.    2.25 g  over 30 Minutes Intravenous EVERY 6 HOURS 24 1936      24 0800  cycloSPORINE modified (GENERIC EQUIVALENT) microemulsion solution 200 mg         200 mg Per Feeding Tube EVERY MORNING 24 1525              Contrast Orders - past 72 hours (72h ago, onward)      None                Plan:  Start vancomycin  1000 mg IV x1.   Vancomycin monitoring method: Renal Replacement Therapy  Vancomycin therapeutic monitoring goal: 15-20 mg/L  Pharmacy will check vancomycin " levels as appropriate in 1-3 Days.    Serum creatinine levels will be ordered daily for the first week of therapy and at least twice weekly for subsequent weeks.      Pradip Kelley RPH

## 2024-03-24 NOTE — H&P
MEDICAL ICU H&P  03/24/2024    Date of Hospital Admission: 2/10/2024  Date of ICU Admission: 3/24/2024  Reason for Critical Care Admission: Acute on chronic hypercapnic respiratory failure  Date of Service (when I saw the patient): 03/24/2024    ASSESSMENT: Sofie Rodriguez is a 63 year old female with PMH COPD s/p bilateral lung transplant 6/28/22 c/b hemidiaphragm palsy and recurrent pneumonias, gastroparesis and small bowel dysmotility complicated by severe malnutrition now s/p PEG/J, ESRD on M/W/F HD, recent R femoral fx s/p ORIF, chronic diarrhea, recurrent c-diff, failure to thrive with inability to tolerate any tube feeds, who was admitted to [facility] on 2/10/24 for concerns over malnutrition and TPN initiation via portacath. Course complicated by recurrent and progressive acute on chronic hypoxic and hypercarbic respiratory failure requiring multiple ICU admissions and intubation 2/18-2/19, 2/28-2/29, 3/18-3/20, for continuous BiPAP. Again with worsening respiratory acidosis and hypercarbia, concern for infection vs acute rejection, requiring transfer back to ICU 3/20/2024.       CHANGES and MAJOR THINGS TODAY:   - Admit to ICU due to worsening respiratory acidosis, concern for infection vs acute rejection. Transplant Pulm requesting transfer for intubation + bronch   - BAL sent    PLAN:    Neuro:  # Pain and sedation  Denies new or acute pain. Uses lidocaine and heating pads as needed for pain management.   - Tylenol Q4 prn  - Lidocaine patch prn  - Heating pads prn  - Zyprexa 5mg at bedtime     # Claustrophobia  Reports claustrophobia contributing to limited compliance with BiPAP. Has seen health psych on 3/20, recommended grounding exercise (5-4-3-2-1) for use on BiPAP.   - Atarax 25mg TID PRN for anxiety  - Ativan 0.25 mg PRN for anxiety    # B/L Neuropathic Pain   New pain b/l this hospitalization. Last A1c <4.2 (7/11/23). Consider possibly 2/2 ESRD. TSH wnl. B12 and B1 elevated, B6 normal.  Copper low (56.3 (), zinc 48.3 (mildly low).  B3 in process.   -Consider gabapentin in the coming days   -Neuro Recs:  - No obvious clinical history or exam findings to suggest neurologic/neuromuscular causes of respiratory weakness  - Neuropathy labs currently pending  - Holding B12 supplement (supra-therapeutic 3/15)  - Discuss with pharmacy: copper & zinc replacement       Pulmonary:  # Acute on chronic hypoxic and hypercarbic respiratory failure  # Recurrent PNAs, concern for acute infection vs acute rejection  # S/p BSLT 6/8/22 for COPD  # R hemidiaphragm palsy   # Positive DSA   # Suspected CARLEE  # PTA nocturnal O2, 2L   S/p bilateral lung transplant 6/28/22 for COPD. Was admitted 2/10 to address malnutrition and admitted to ICU on 2/17 with hypoxic hypercarbic respiratory failure. Intubated on 2/18 due to worsening oxygen requirements, likely due to pulmonary edema given hx of ESRD requiring HD vs infection given hx of lung transplant, immunosuppressed status, and recurrent pneumonias. Required intubation 2/17 - 2/19. Ongoing respiratory acidosis despite BiPAP/AVAPS, claustrophobic while using to intermittent compliance.  Neurology consulted and MRI r/o central cause problem for respiratory drive. Started on AVAPS with improvement in mental status and VBG, and patient transferred back to medicine floor on 2/29. 3/08 SNIFF with no definite paradoxical movement of bilateral hemidiaphragm. Transferred back to ICU 3/18-3/20 d/t hypercarbia, severe respiratory acidosis, but did not require intubation during this time. Issues with anxiety/claustrophobia while using the mask, Atarax has helped though Zyprexa led to hallucinations. Patient's baseline appears to be with pH mid 7.2s and pCO2  mid 70-80s at best. Even with pH this low, and pCO2 that high, mentation remains stable. CT chest 3/24 w/ scattered opacity throughout all lungs; dilated pulm artery. On BiPAP 16/5 at time of transfer. Transplant pulm  concerned for infectious vs acute rejection picture, recommending intubation + bronchoscopy. Volume overload + pulmonary edema complicating picture, as patient has had low pressures limiting HD runs.   - Recheck VBG upon arrival to ICU  - Intubation + bronchoscopy on arrival  - Transplant pulm following, appreciate recs   - 3/18 prospera in process  - Immunosuppression:   >Prednisone 5 mg every morning, 2.5 mg every afternoon  >Cyclosporin 200mg BID (Stopped tacrolimus 3/10)   >Overnight oximetry study suggestive of O2 vs CPAP need, as did require up to 2LPM overnight to prevent hypoxia  >Try to minimize O2 to preserve respiratory drive, will give IVIG for DSA+   >Hold Bactrim while on empiric Zosyn (Stopped PTA dapsone MWF for PJP prophylaxis; replaced with Bactrim on 3/11)  >D/c'd theophylline 3/3/24 due to difficulty attaining therapeutic levels (started by ICU), could consider Modafinil   >Repeat metabolic CART study ordered by Transplant Pulm   - Airway clearance/nebs:   - Xopenex neb BID  - Hypertonic saline nebs q 3 hours PRN   - Volume removal with iHD   - Will need to place lines for CRRT if unable to run UF prior to arrival to ICU  - HOLD Oral bicarb 1300mg TID per Nephrology    - Sleep clinic eval at discharge for suspected CARLEE  - Limit medications that would depress respiration    #GOC  Care conference on 3/15 with Transplant Pulm, [facility], Medicine, daughters (Julia Nash) and Transplant SW. Overall medical updates provided and Qs answered. With declining respiratory acidosis on labs, discussed code status 3/18. Patient initially not desiring any escalation of her care to ICU/intubation with frustration re overall course. However, after discussing with her daughter on the phone she and family elected to remain full code and agreed to ICU admission and intubation if necessary.   - Transplant Pulm requesting additional care conference: will schedule for early next week (3/25)    Cardiovascular:  #  A-flutter (recurrent on 3/17)  # A-fib, intermittent  # HTN  # HFpEF  Noted atrial fibrillation on arrival with HR elevated at 150, not sustained for > 10 minutes and otherwise hemodynamically stable. RVR resolved w/o medications.  PTA metoprolol (25mg BID) has been held through much of admission. On 3/17 new Aflutter developed overnight. HR 150s with flutter waves on EKG. Metoprolol administered along with Mg infusion with minimal response. Patient was then given amiodarone bolus and placed on drip which slightly lowered her HR in 120-130s. This was held for borderline hypotension 3/18, but restarted again with amiodarone 3/19, transition to current regimen on 3/21. 2/17 TTE: LVEF 55-60%, global RV function normal, no significant valvular abnormalities.   - MAP goal > 65 mmHg   - Metoprolol tartrate dose 25 mg BID (hold if MAP<65)  - Continue amiodarone 400mg BID PO  - Continuous telemetry     GI/Nutrition:  # Severe malnutrition   # Failure to thrive  # Hypoalbuminemia  # Gastroparesis, small bowel dysmotility  # S/P PEG/J with intolerance of enteral nutrition  #  Chronic osmotic diarrhea/SIBO s/p Rifaximin  # Recurrent C.Diff (3rd ep)    # GERD  Patient with gastroparesis (presumed due to vagal injury) and small bowel dysmotility complicated by unintentional 40lb weight loss over the past year and now severe malnutrition. Previously intolerant to oral food intake due to nausea. Was initially admitted for portacath and TPN initiation since 2/13. After extubation has been able to tolerate feeds without n/v from 2/20.  Per GI, next steps for workup for malnutrition would include CT enterography to evaluate for anatomic abnormalities contributing to malnutrition vs possible treatment for SIBO (though patient has had multiple courses of treatment in the past with no long term improvement). Patient couldn't tolerate the oral load for CT enterography on 2/21, so will defer this until she is able to tolerate greater PO  load. On 2/23 , again discussed with patient the need of small bowel motility study if not improving outpatient through [facility]. No ongoing concerns for SIBO on 2/23 as patient is passing multiple episodes of stools and gas with no abdominal pain or distention. C-diff test negative on 2/21. Per RD, patient tolerating >300 kcal of intake PO currently, so stopped trickle Tube feeds and continuing with PO and TPN for nutrition (sufficient for preventing gut atrophy). As of 3/11 transplant pulmonology is worried that TPN is not a realistic plan to continue at home and would like to transition back to TF (RD oncerned pt is not absorbing any oral intake). Since transitioned off TPN (discontinued 3/16). Transplant pulm also worried about mucosal toxicity. Repeat enteric studies showed recurrent C.diff;  Fidaxomicin x 10 days started (3/13-3/22, GI approved), with improvement in diarrhea. Transplant pulm requesting repeat colonoscopy w/ Bx after completion of c.diff treatment, to r/o toxicity or other reason that diarrhea is present.   - Nutrition consulted, appreciate assistance  - Resume TF at goal rate 35 ml/hr via PEG/J          - Consider reconsult GI next week, after metabolic CART study, for future colonoscopy +/- biopsy if diarrhea returns   - May benefit from small bowel motility study if not improving outpatient through [facility].   - PPI BID  - Bowel regimen PRN     Renal/Fluids/Electrolytes:  # ESRD on HD  # Hypervolemic hyponatremia, resolved  # Mild hyperphosphatemia  # Anion gap metabolic acidosis - resolved  Patient is ESRD on T/Th/Sat HD as outpt, now approx M/W/F inpatient. Hgb stabilizing. HD tolerating until 3/23. Briefly required extra Monday runs, not since being off TPN. She would prefer longer sessions to more days.  - Currently holding Sevelamer but may need to resume in the coming days per Nephrology.   - CBC and CMP daily  - Strict I/Os  - Daily weight   - Renally adjust medications     Endocrine:  #  Hyperglycemia, stress induced  - Low dose sliding scale insulin  - Hypoglycemia protocol    # Hypothyroidism  Patient with new (24) low T4 at 0.64 and TSH at 6.5, concerning for new hypothyroidism vs sick thyroid syndrome. TSH with appropriate response, more consistent with elevation in the setting of acute illness. ICU team on  recommended initiation of treatment for possible hypothyroid as this can improve respiratory muscle function in the setting of weakness and malnutrition. 3/7, 3/15, 3/20 repeat thyroid function WNL.  - Stop liothyronine  - Increase levothyroxine to 75 mcg daily     ID:  # Recurrent pneumonia, concern for acute infection vs rejection  # Recurrent C.Diff colitis (3rd ep)  # Hx EBV viremia   # Hypogammaglobulinemia  # Chronic immunosuppression  lung transplant  Empirically treated for pneumonia  - , RVP and cultures no growth to date. Recurrent C.Diff Positive 3/12, s/p PO Fidaxomicin 200 mg BID for 10 days (3/13-3/22) with improvement in diarrhea. Remains afebrile with mild leukocytosis.  Ig, s/p IVIG.  EBV 27K (decreased compared to prior).     - Bronchoscopy on arrival to ICU  - Follow up blood cultures from today (3/24)  - Continue empiric antibiotics as below  - Transplant ID consulted    Antibiotics:  Zosyn ( - , 3/24-current)  Vancomycin ( - , 3/24-current)  Micafungin (- , 3/24 x1)  Bactrim currently held (MWF, PJP ppx, previously on Dapsone)  Fidaxomicin (3/13-3/22)    Hematology:    #Acute on chronic anemia  ESRD  Hgb stable, with no acute signs of bleeding. Acute drop  from 8.2 > 6.6 after two rechecks, no overt signs of bleeding; required 1U pRBC transfusion.    - Daily CBC  - Transfuse for Hgb < 7  - Holding Epogen with dialysis in setting of concern for acute infection   - Holding Venofer 50 mcg qweek with dialysis in setting of concern for acute infection    #Steal physiology of LUE dialysis access fistula  LUE  arterial US obtained 2/21 with concern for LUE edema. US of fistula demonstrated steal physiology. Discussed with nephrology, and vascular surgery consulted on 2/22. Vascular surgery has only minor concerns for steal symptoms and given that the AVF is working well, they are okay with outpatient fistulograms/ venoplasty with wrist brachial index and PPG's, unless new concerns arise.  - Plan for outpatient workup as above; nephrology in agreement with outpatient workup.    Musculoskeletal:  # Right hip fracture s/p ORIF (December 2023)   - PT/OT     Skin:  # Left upper extremity unilateral edema, improving   # Bilateral pedal and ankle edema, improving  Edema due to third spacing due to hypoalbuminemia vs HF. BNP >34263 at admission, Echo on 2/18 shows EF of 55-60% with collapsible IVC. Extremity edema 2/2 to hypoalbuminemia. Upper limb duplex USG on 2/18 negative for DVT.  USG left arm on 2/21 shows steal physiology and Vascular Surgery consulted (see Heme section). Overall edema in extremities have improved significantly with dialysis.    - Elevation and wrapping of only lower legs as needed and increased UF per nephrology for volume management; no wrapping of left upper extremity with dialysis fistula    General Cares/Prophylaxis:    DVT Prophylaxis: Apixaban  GI Prophylaxis: PPI  Restraints: N/A  Family Communication: Family updated  Code Status: FULL    Per Medicine team, discussion with daughter and patient regarding worsening respiratory status requiring transfer to ICU - agreeable to ICU admission and intubation if necessary. Upon arrival to ICU, patient confirmed full code status and intubation if necessary.     Lines/tubes/drains:  - PEG/J  - PICC line in RUE     Disposition:  - Medical ICU     Patient seen and findings/plan discussed with medical ICU staff, Dr. Bell.    Angelica Mejia, MS4    Resident/Fellow Attestation   I, Ting Aceves MD, was present with the medical/EMILY student who participated in  the service and in the documentation of the note.  I have verified the history and personally performed the physical exam and medical decision making.  I agree with the assessment and plan of care as documented in the note.      Ting Aceves MD  PGY3  Date of Service (when I saw the patient): 03/24/24    Clinically Significant Risk Factors              # Hypoalbuminemia: Lowest albumin = 2.6 g/dL at 2/18/2024  5:13 AM, will monitor as appropriate  # Coagulation Defect: INR = 1.22 (Ref range: 0.85 - 1.15) and/or PTT = N/A, will monitor for bleeding            # Severe Malnutrition: based on nutrition assessment      # Financial/Environmental Concerns: none            -----------------------------------------------------------------------    HISTORY PRESENTING ILLNESS: Sofie Rodriguez is a 63 year old female with PMH COPD s/p bilateral lung transplant 6/28/22 c/b hemidiaphragm palsy and recurrent pneumonias, gastroparesis and small bowel dysmotility complicated by severe malnutrition now s/p PEG/J, ESRD on M/W/F HD, recent R femoral fx s/p ORIF, chronic diarrhea, recurrent c-diff, failure to thrive with inability to tolerate any tube feeds, who was admitted to [facility] on 2/10/24 for concerns over malnutrition and TPN initiation via portacath. Course complicated by recurrent and progressive acute on chronic hypoxic and hypercarbic respiratory failure requiring multiple ICU admissions and intubation 2/18-2/19, 2/28-2/29, 3/18-3/20, for continuous BiPAP. Again with worsening respiratory acidosis and hypercarbia, concern for infection vs acute rejection, requiring transfer back to ICU 3/20/2024.     Ongoing respiratory acidosis with limited BiPAP compliance 2/2 claustrophobia. Atarax has helped though Zyprexa led to hallucinations. Patient's baseline appears to be with pH mid 7.2s and pCO2  mid 70-80s at best. Even with pH this low, and pCO2 that high, mentation remains stable. CT chest 3/24 w/ scattered  opacity throughout all lungs; dilated pulm artery. On BiPAP 16/5 at time of transfer. Transplant pulm concerned for infectious vs acute rejection picture, recommending intubation + bronchoscopy. Started empiric vancomycin & Zosyn, micafungin x1. Ordered repeat metabolic CART study. Recurrent C.Diff Positive 3/12, s/p PO Fidaxomicin 200 mg BID for 10 days (3/13-3/22) with improvement in diarrhea. Remains afebrile with mild leukocytosis. Volume overload + pulmonary edema complicating picture, as patient has had low pressures limiting HD runs.    Patient reports feeling nervous about intubation but confirms she wishes to proceed.        REVIEW OF SYSTEMS: Negative except for above.       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    Hepatitis 2017    Hep C, [facility]    History of blood transfusion     HTN (hypertension)     Infectious mononucleosis     Lung infection 11/30/2022    Osteopenia      SURGICAL HISTORY:  Past Surgical History:   Procedure Laterality Date    BRONCHOSCOPY (RIGID OR FLEXIBLE), DIAGNOSTIC N/A 08/02/2022    Procedure: BRONCHOSCOPY, DIAGNOSTIC- inspection Bronch;  Surgeon: Kamala Lovell MD;  [facility]: [facility]    BRONCHOSCOPY (RIGID OR FLEXIBLE), DIAGNOSTIC N/A 09/13/2022    Procedure: INSPECTION BRONCHOSCOPY, WITH BRONCHOALVEOLAR LAVAGE;  Surgeon: Jose R Mccullough MD;  Location: [facility]    BRONCHOSCOPY (RIGID OR FLEXIBLE), DIAGNOSTIC N/A 11/09/2022    Procedure: BRONCHOSCOPY, WITH BRONCHOALVEOLAR LAVAGE AND BIOPSY;  Surgeon: Cesar Lima MD;  [facility]: [facility]    BRONCHOSCOPY (RIGID OR FLEXIBLE), DIAGNOSTIC N/A 01/25/2023    Procedure: BRONCHOSCOPY, WITH BRONCHOALVEOLAR LAVAGE AND BIOPSY;  Surgeon: Mason Reddy MD;  [facility]: [facility]    BRONCHOSCOPY  (RIGID OR FLEXIBLE), DIAGNOSTIC N/A 04/19/2023    Procedure: BRONCHOSCOPY, WITH BRONCHOALVEOLAR LAVAGE AND BIOPSY;  Surgeon: Kamala Lovell MD;  Location:  GI    BRONCHOSCOPY (RIGID OR FLEXIBLE), DIAGNOSTIC N/A 07/12/2023    Procedure: BRONCHOSCOPY, WITH BRONCHOALVEOLAR LAVAGE AND BIOPSY;  Surgeon: Cesar Lima MD;  Location:  GI    BRONCHOSCOPY FLEXIBLE AND RIGID N/A 07/19/2022    Procedure: BRONCHOSCOPY inspection only;  Surgeon: Bob Liao MD;  Location:  GI    COLONOSCOPY  2015    CORONARY ANGIOGRAPHY ADULT ORDER      CV CORONARY ANGIOGRAM N/A 06/30/2021    Procedure: CV CORONARY ANGIOGRAM;  Surgeon: Alexander Cuellar MD;  Location:  HEART CARDIAC CATH LAB    CV RIGHT HEART CATH MEASUREMENTS RECORDED N/A 06/30/2021    Procedure: CV RIGHT HEART CATH;  Surgeon: Alexander Cuellar MD;  Location:  HEART CARDIAC CATH LAB    ENDOSCOPIC PERORAL MYOTOMY N/A 10/09/2023    Procedure: MYOTOMY, ESOPHAGUS, ENDOSCOPIC, ORAL APPROACH;  Surgeon: Gonzalez Navarro MD;  [facility]    ENDOSCOPIC RETROGRADE CHOLANGIOPANCREATOGRAM N/A 08/11/2022    Procedure: ENDOSCOPIC RETROGRADE CHOLANGIOPANCREATOGRAPHY WITH PANCREATIC DUCT NEEDLE KNIFE AND STENT PLACEMENT, BILE DUCT SPHINCTEROTOMY, BLOOD/DEBRIS REMOVAL AND STENT PLACEMENT;  Surgeon: Cosmo Arroyo MD;  [facility]    ENDOSCOPIC RETROGRADE CHOLANGIOPANCREATOGRAM N/A 10/07/2022    Procedure: ENDOSCOPIC RETROGRADE CHOLANGIOPANCREATOGRAPHY with biliary and pancreatic stent removal, debris removal;  Surgeon: Cosmo Arroyo MD;  Location:  OR    ENT SURGERY  1974    tonsillectomy    ENTEROSCOPY SMALL BOWEL N/A 08/11/2022    Procedure: SMALL BOWEL ENTEROSCOPY;  Surgeon: Cosmo Arroyo MD;  Location:  OR    ESOPHAGOGASTRODUODENOSCOPY, WITH NASOGASTRIC TUBE INSERTION N/A 07/01/2022    Procedure: ESOPHAGOGASTRODUODENOSCOPY, WITH NASOJEJUNAL TUBE INSERTION;  Surgeon: Ozzy Nickerson MD;  Location:  GI    ESOPHAGOSCOPY,  GASTROSCOPY, DUODENOSCOPY (EGD), COMBINED N/A 08/03/2022    Procedure: ESOPHAGOGASTRODUODENOSCOPY (EGD);  Surgeon: Ira Andres MD;  [facility]: [facility]    ESOPHAGOSCOPY, GASTROSCOPY, DUODENOSCOPY (EGD), COMBINED N/A 01/25/2023    Procedure: ESOPHAGOGASTRODUODENOSCOPY (EGD) with botox injection;  Surgeon: Gonzalez Navarro MD;  [facility]: [facility]    ESOPHAGOSCOPY, GASTROSCOPY, DUODENOSCOPY (EGD), COMBINED N/A 10/09/2023    Procedure: ESOPHAGOGASTRODUODENOSCOPY;  Surgeon: Gonzalez Navarro MD;  [facility]    HAND SURGERY      INSERT CHEST TUBE Right 09/13/2022    Procedure: Insert chest tube;  Surgeon: Jose R Mccullough MD;  Location: [facility]    IR CVC TUNNEL PLACEMENT > 5 YRS OF AGE  09/26/2022    IR CVC TUNNEL PLACEMENT > 5 YRS OF AGE  02/14/2024    IR CVC TUNNEL REMOVAL RIGHT  3/6/2024    IR GASTRO JEJUNOSTOMY TUBE CHANGE  08/31/2022    IR GASTRO JEJUNOSTOMY TUBE CHANGE  12/21/2022    IR GASTRO JEJUNOSTOMY TUBE CHANGE  07/12/2023    IR GASTRO JEJUNOSTOMY TUBE CHANGE  08/18/2023    IR GASTRO JEJUNOSTOMY TUBE CHANGE  11/14/2023    IR GASTRO JEJUNOSTOMY TUBE PLACEMENT  07/27/2022    IR THORACENTESIS  08/29/2022    LEEP TX, CERVICAL  04/07/2017    HECTOR III    LYMPH NODE BIOPSY Left 2005    Left axilla, benign- [place]    MIDLINE INSERTION - DOUBLE LUMEN Left 07/28/2022    20cm, Basilic vein    PICC DOUBLE LUMEN PLACEMENT Right 03/04/2024    5FR DL PICc, basiliv vein- L-36cm, 1cm out.    REPLACE GASTROJEJUNOSTOMY TUBE, PERCUTANEOUS  10/07/2022    Procedure: Replace Gastrojejunostomy Tube;  Surgeon: Cosmo Arroyo MD;  [facility]    THORACENTESIS Left 08/29/2022    Procedure: THORACENTESIS;  Surgeon: oB Capone PA-C;  Location: [facility] OR    THORACENTESIS Left 09/13/2022    Procedure: Thoracentesis;  Surgeon: Jose R Mccullough MD;  [facility]: [facility]    THROMBECTOMY UPPER EXTREMITY Left 07/02/2022    Procedure: LEFT RADIAL ARM THROMBECTOMY;  Surgeon: Christie Graham MD;  [facility]: [facility]     TRANSPLANT LUNG RECIPIENT SINGLE X2 Bilateral 06/28/2022    Procedure: Clamshell Incision, Bilateral Sequential Lung Transplant, On Cardiopulmonary Bypass, Flexible Bronchoscopy;  Surgeon: Sue Sunshine MD;  Location:  OR     SOCIAL HISTORY:  Social History     Socioeconomic History    Marital status:    Tobacco Use    Smoking status: Former     Packs/day: 0.50     Years: 30.00     Additional pack years: 0.00     Total pack years: 15.00     Types: Cigarettes     Quit date: 11/11/2020     Years since quitting: 3.3    Smokeless tobacco: Never   Substance and Sexual Activity    Alcohol use: Not Currently     Comment: Stopped drinking in 2020    Drug use: Not Currently     Types: Marijuana, Methamphetamines     Comment: hx:marijuana and methamphetamine-quit both unsure ?  2-3 yrs ago     Social Determinants of Health     Interpersonal Safety: Not At Risk (9/1/2023)    Humiliation, Afraid, Rape, and Kick questionnaire     Fear of Current or Ex-Partner: No     Emotionally Abused: No     Physically Abused: No     Sexually Abused: No     FAMILY HISTORY:   No family history on file.  ALLERGIES:   Allergies   Allergen Reactions    Blood Transfusion Related (Informational Only) Other (See Comments)     Patient has a history of a clinically significant antibody against RBC antigens.  A delay in compatible RBCs may occur.     No Clinical Screening - See Comments Other (See Comments)     Patient has a history of a clinically significant antibody against RBC antigens.  A delay in compatible RBCs may occur.    Azithromycin Rash     12/1/22: Developed a rash that is not raised and looks diffuse in nature. It started in the groin and up the back and has now worked its way up her chest into her face. Pt states that it has now started to itch. She is breathing and talking normally and denies any airway changes. Unclear what started rash. Pt noted feeling somewhat itchy yesterday.    Ganciclovir Rash     12/1/22:  Developed a rash that is not raised and looks diffuse in nature. It started in the groin and up the back and has now worked its way up her chest into her face. Pt states that it has now started to itch. She is breathing and talking normally and denies any airway changes. Unclear what started rash. Pt noted feeling somewhat itchy yesterday.     MEDICATIONS:  No current facility-administered medications on file prior to encounter.  acetaminophen (TYLENOL) 500 MG tablet, 500-1,000 mg by Per J Tube route 3 times daily as needed for mild pain  B Complex-C-Folic Acid (DIALYVITE) TABS, 1 tablet by Per J Tube route every morning  Calcium Carbonate-Vitamin D 600-10 MG-MCG TABS, 1 tablet by Per J Tube route 3 times daily  dapsone 2 mg/mL SUSP, 25 mLs (50 mg) by Per J Tube route Every Mon, Wed, Fri Morning  furosemide (LASIX) 40 MG tablet, 40 mg by Per J Tube route 2 times daily  loperamide (IMODIUM A-D) 2 MG tablet, 1 tablet (2 mg) by Per J Tube route 4 times daily as needed for diarrhea  metoprolol tartrate (LOPRESSOR) 50 MG tablet, 0.5 tablets (25 mg) by Per Feeding Tube route 2 times daily  multivitamin (CENTRUM SILVER) tablet, Take 1 tablet by mouth daily  pantoprazole (PROTONIX) 2 mg/mL SUSP suspension, 20 mLs (40 mg) by Per J Tube route 2 times daily  predniSONE (DELTASONE) 5 MG tablet, Take 1 tablet (5 mg) by mouth every morning AND 0.5 tablets (2.5 mg) every evening. Take 1 tab (5mg) at AM and 1/2 tab (2.5) in pm (Patient taking differently: Take 1 tablet (5 mg) by J-tube every morning AND 0.5 tablets (2.5 mg) every evening. Take 1 tab (5mg) at AM and 1/2 tab (2.5) in pm)  protein modular (PROSOURCE TF) LIQD, 1 packet by Per Feeding Tube route daily (Patient taking differently: 1 packet by Per Feeding Tube route daily at 2 pm)  sevelamer carbonate, RENVELA, 0.8 GM PACK Packet, Take 1 packet (0.8 g) by mouth 3 times daily (with meals) (Patient taking differently: 0.8 g by Per J Tube route 3 times daily (with  meals))  tacrolimus (GENERIC) 1 mg/mL suspension, Take 4 mLs (4 mg) by mouth every morning AND 4 mLs (4 mg) every evening. (Patient taking differently: Take 4 mLs (4 mg) by j-tube every morning AND 4 mLs (4 mg) every evening.)  vitamin B-12 (CYANOCOBALAMIN) 500 MCG tablet, 1 tablet (500 mcg) by Per Feeding Tube route daily  [DISCONTINUED] albuterol (PROAIR HFA/PROVENTIL HFA/VENTOLIN HFA) 108 (90 BASE) MCG/ACT Inhaler, Inhale 2 puffs into the lungs every 6 hours as needed for shortness of breath / dyspnea or wheezing  [DISCONTINUED] insulin glargine (LANTUS PEN) 100 UNIT/ML pen, Inject 7 Units Subcutaneous 2 times daily  [DISCONTINUED] ipratropium - albuterol 0.5 mg/2.5 mg/3 mL (DUONEB) 0.5-2.5 (3) MG/3ML neb solution, Inhale 1 vial into the lungs every 4 hours as needed for shortness of breath / dyspnea  [DISCONTINUED] mycophenolate (GENERIC EQUIVALENT) 200 MG/ML suspension, 3.75 mLs (750 mg) by Per J Tube route 2 times daily  [DISCONTINUED] valGANciclovir (VALCYTE) 50 MG/ML solution, 9 mLs (450 mg) by Oral or Feeding Tube route three times a week (Patient taking differently: 450 mg by Oral or Feeding Tube route three times a week Take on Mon/Wed/Fri)        PHYSICAL EXAMINATION:  Temp:  [97  F (36.1  C)-99.4  F (37.4  C)] 97  F (36.1  C)  Pulse:  [101-115] 101  Resp:  [25-34] 34  BP: ()/(52-72) 121/72  Cuff Mean (mmHg):  [63-67] 67  FiO2 (%):  [35 %-45 %] 45 %  SpO2:  [90 %-97 %] 95 %    General: Resting in bed, NAD on BiPAP  HEENT: Mucous membranes dry  Neuro: A&Ox3, moving all extremities, equal strength  Pulm/Resp: Diminished breath sounds bilaterally without rhonchi, crackles or wheeze, breathing non-labored on BiPAP   CV: RRR, S1S2, no murmur, rub, or gallop  Abdomen: Soft, non-distended, non-tender, hypoactive  Ext: 1+ pedal edema, pulses 2+ radial, pedal  Incisions/Skin: Warm, dry. No rashes or lesions.     LABS: Reviewed.   Venous Blood Gas  Recent Labs   Lab 03/24/24  0929 03/24/24  5104  03/23/24 2246 03/23/24 2032   PHV 7.15* 7.13* 7.18* 7.13*   PCO2V 85* 92* 83* 95*   PO2V 45 53* 38 53*   HCO3V 30* 30* 31* 32*   LINDY -0.2 -0.4 1.2 0.9   O2PER 45 40 40 40       Arterial Blood Gases   Recent Labs   Lab 03/19/24  1159   PH 7.25*   PCO2 77*   PO2 109*   HCO3 34*     Complete Blood Count   Recent Labs   Lab 03/24/24 0434 03/23/24 0427 03/22/24 0447 03/21/24 0443   WBC 7.8 9.9 8.9 9.6   HGB 9.1* 10.0* 10.0* 10.2*    191 180 191     Basic Metabolic Panel  Recent Labs   Lab 03/24/24  0835 03/24/24 0434 03/24/24 0422 03/23/24 2331 03/23/24 0752 03/23/24 0427 03/22/24 0453 03/22/24 0447 03/21/24  0926 03/21/24 0443   NA  --  134*  --   --   --  138  --  137  --  135   POTASSIUM  --  4.0  --   --   --  4.3  --  3.7  --  3.9   CHLORIDE  --  96*  --   --   --  96*  --  97*  --  94*   CO2  --  27  --   --   --  29  --  30*  --  30*   BUN  --  50.0*  --   --   --  70.0*  --  43.1*  --  56.4*   CR  --  2.93*  --   --   --  4.13*  --  2.81*  --  3.37*   * 156* 164* 107*   < > 161*   < > 136*   < > 125*    < > = values in this interval not displayed.     Liver Function Tests  Recent Labs   Lab 03/24/24 0434 03/23/24 0427 03/22/24 0447 03/21/24  1510 03/21/24 0443   AST 16 12 13  --  17   ALT 11 9 9  --  11   ALKPHOS 134 139 131  --  137   BILITOTAL 0.4 0.3 0.3  --  0.3   ALBUMIN 3.5 3.7 3.6  --  3.8   INR 1.22* 1.12 1.06 0.98  --      Coagulation Profile  Recent Labs   Lab 03/24/24  0434 03/23/24  0427 03/22/24  0447 03/21/24  1510   INR 1.22* 1.12 1.06 0.98       IMAGING:  Recent Results (from the past 24 hour(s))   XR Chest Port 1 View    Narrative    EXAM: XR CHEST PORT 1 VIEW  3/23/2024 2:02 PM      HISTORY: worsening hypoxic resp failure. Lung tx with CLAD and ESRD on  iHD. ?volume vs infection    COMPARISON: Chest x-ray 3/18/2024, CT chest 3/18/2034    FINDINGS: Single view of the chest. ] IJ central venous catheter tip  terminates at the superior cavoatrial junction. Post  surgical changes  in the chest with intact clamshell sternotomy wires. Trachea is  midline. Cardiac silhouette is stable with continued prominent  pulmonary artery and left atrial appendage convexity. Increased  diffuse right greater than left mixed interstitial and airspace  opacities with decreased aeration most notable in the right upper lung  field. Small left greater than right pleural effusions. No appreciable  pneumothorax.      Impression    IMPRESSION:   1. Increased diffuse interstitial and airspace opacities bilaterally,  with notable decreased aeration throughout the right lung, which may  represent worsening pulmonary edema, though underlying infection is  not excluded.  2. Stable small left greater than right pleural effusions.    I have personally reviewed the examination and initial interpretation  and I agree with the findings.    YANNICK BENAVIDEZ MD         SYSTEM ID:  [id number]   CT Chest w/o Contrast    Narrative    EXAMINATION: CT CHEST W/O CONTRAST, 3/23/2024 6:57 PM    TECHNIQUE:  Helical CT images from the thoracic inlet through the lung  bases were obtained without IV contrast.     COMPARISON: CT chest 3/18/2024    HISTORY: Worsening hypoxia and CXR. S/p Lungtx and difficulty with  volume removal.    FINDINGS:  Postoperative changes of bilateral lung transplantation. Right upper  extremity PICC with tip terminating in the inferior superior vena  cava.    Chest: Thyroid gland is unremarkable. Heart is enlarged. Main  pulmonary artery is mildly enlarged measuring approximately 3.6 cm.  Thoracic aorta is of normal caliber, there is calcifications of the  aortic arch and branching great vessels. Standard branching pattern.     There is right and left posteriorly predominantly patchy consolidative  opacities throughout all lobes with surrounding groundglass opacity  and air bronchograms present. Consolidative opacity in the upper  lateral left lower lobe measuring approximately 1 cm (series 5  image  93). Paraseptal thickening prominently in the anterior right lung and  bilateral lower lobes. No significant pleural effusions. No  pneumothorax.    Upper abdomen: Limited evaluation of the upper abdomen. No acute  pathology of these organs organs visualized. Consolidative opacity in  the upper lateral Calcifications of the abdominal aorta. Numerous left  renal calculi, nonobstructive.    Bones/soft tissues: No acute osseous abnormalities or suspicious bony  lesions. Clamshell sternotomy wires from transplant.      Impression    IMPRESSION: In this patient status post lung transplant:  1. Scattered consolidative and ground glass opacities throughout all  lobes, likely to represent infectious etiology.  2. Dilated main pulmonary artery, may represent pulmonary  hypertension.    I have personally reviewed the examination and initial interpretation  and I agree with the findings.    YANNICK BENAVIDEZ MD         SYSTEM ID:  [id number]

## 2024-03-24 NOTE — PROGRESS NOTES
Lung Transplant Consult Follow Up Note   March 24, 2024            Assessment and Plan:   Sofie Rodriguez is a 63 year old female with h/o COPD s/p BSLT (2022) with course complicated by post-operative hemidiaphragm palsy, recurrent PNAs, positive DSA, EBV viremia, hypogammaglobulinemia, severe gastroparesis s/p G/J tube placement (7/27/22) and pyloric botox (1/25/23), GI bleed 2/2 pyloric ulcer, hemobilia s/p ERCP and MRCP, chronic diarrhea, recurrent C diff colitis, ESRD on iHD, recurrent falls with injuries (recent right hip fx s/p ORIF 12/2023), deconditioning, and FTT.  Admitted 2/10 for failure to thrive and initiation of TPN/lipids.  Emergent intubation 2/17-2/19 for hypoxic and hypercapneic respiratory failure with severe respiratory acidosis and encephalopathy.  Transient improvements in recurrent acute on chronic hypercapnia respiratory failure with increased iHD, infectious workup unremarkable.  Returned to ICU 2/28-2/29 and again 3/18 d/t tenuous respiratory status complicated by variable daytime NIPPV tolerance. Transferred out of the ICU on 3/20. Increasing O2 requirement, worsening CO2 retention, increasing dyspnea  and CT with extensive bilateral infiltrate, transfer to ICU for intubation and bronch/BAL today (3/24)        Today's recommendations:  - Transfer to ICU for intubation and bronchoscopy and BAL for opportunistic cultures (discussed with ICU and primary team).  - Blood culture X 2  (ordered).  - Continue current antibiotics, adjust based on BAL results.  - CSA steady state level on 3/26 (ordered)  - Continue current CSA and prednisone.  - Consider CRRT  - DSA 5/23 pending   - Tentative plan for care conference for medical update and long term planning per primary        Acute on chronic hypoxic/hypercapneic respiratory failure:  S/p bilateral sequential lung transplant for COPD:   Hypoventilation, Suspected CARLEE: CT (2/7) with decreased MARLON opacities but new tree in bud RLL opacities.  PFTs  unchanged in clinic (but ATS criteria not met), remain significantly below her baseline.  Baseline hypoxia with 2L NC overnight PTA.  S/p intubation 2/17-2/19 for respiratory decompensation with encephalopathy and severe respiratory acidosis, CT with new patchy consolidative and nodular opacities, GGO primarily in the bases and intralobular septal thickening (concerning for pulmonary edema given recent addition of TPN nutrition).  Bronch (2/18) with very friable tissue, cutlures only with C. glabrata.  Persistent respiratory failure also complicated by hypoventilation and deconditioning d/t malnutrition.  Returned to ICU 2/28-2/29, intubation deferred given improvement with continuous NIPPV with minimal interruptions (sodium bicarb also stopped, likely partially contributing).  Neurology consulted 2/27, MRI brain (3/1) without acute intracranial pathology.  SNIFF (3/8) normal.  Metabolic CART suggested overfeeding on TPN.  NIF and FVC continue to downtrend (3/5-3/15) prompting concern for potential neuromuscular cause.  Continues with refractory severe hypercapneic respiratory failure and recurrent intolerance of extended time off NIPPV.  Returned to ICU again 3/18, respiratory panel negative, CXR with increased pulmonary edema but unable to pull enough volume with iHD (weight up 5.2 kg 3/14 to 3/19) given hypotension.  Overall, her PCO2 retention is likely multifactorial with a component being from overfeeding (though remedied with nutrition adjustment), metabolic compensation barrier d/t renal failure, pulmonary edema, bicarb loss with diarrhea, hypoventilation with declining FVC concerning for neuromuscular etiology (though Neurology consult was not revealing), and NIPPV intolerance due to claustrophobia. Now (3/24) with increasing O2 requirement, worsening CO2 retention, increasing dyspnea  and CT with extensive bilateral infiltrate, transfer to ICU for intubation and bronch/BAL today (3/24).  Etiology likely  multifactorial including volume overload and infection, ACR or AMR less likely.  - 3/23 CT Chest, reviewed by me, extensive bilateral diffuse dense bilateral consolidation/infiltrate, markedly increased from previous.  - Transfer to ICU  - Intubation/mechanical ventilation  - Bronch/BAL with broad cultures for opportunistic organisms.  - Continue broad spectrum antibiotics, adjust when cultures are available.   - Blood culture X 2 (ordered)  - 3/18 Cell Free DNA 0.44 (preliminary) suggests ACR unlikely.       Immunosuppression:  AZA previously stopped 5/2023 d/t low ImmuKnow assay and EBV viremia.  ImmuKnow (2/27) remained low at 88.  Transitioned from [place] to CSA 3/11.  - CSA goal 140-170.  Multiple dose changes and mis-timed doses in past few days. Check steady state 3/26 (ordered).   - Prednisone 5 mg qAM / 2.5 mg qPM     Prophylaxis:   - Bactrim qMWF for PJP ppx (3/13, prior dapsone through 3/11)  - CMV ppx not currently indicated, D+/R+, CMV negative 3/18     Positive DSA: AMR treatment deferred given frailty and stable PFTs.  DSA DQB2 stable to decreased on 3/6 with 5667 mfi.  Most recent cell-free DNA (2/18) mildly elevated at 1.04 (concerning for possible rejection), which was increased from prior level of 0.12 (1/10).  IST increase deferred at that time per Dr. Gong.  S/p IVIG (2/27) for DSA (IgG WNL).  - Immuknow was 108 (1/10) and 88 on 2/27/24.  - Repeat DSA due 4/6  - 3/18 Cell Free DNA 0.44 ([place], preliminary) suggests AMR less likely.      EBV viremia: CT CAP (2/7) without lymphadenopathy.  Most recent level (2/18) improved to 28k from 96k (2/7).  Repeat EBV (3/18) 23k.     Other relevant problems being managed by the primary team:      FTT:  Severe protein calorie malnutrition:  Gastroparesis s/p PEG/J, botox, and G-POEM:  SB hypomotility:  Pyloric ulcer:  Chronic nausea and osmotic diarrhea:  SIBO s/p rifaximin:   Recurrent C diff colitis: Chronic osmotic and infectious diarrhea since  transplant with recurrent episodes of C diff.  Notable weight loss (40# in a year) d/t diarrhea, GI dysmotility, and intolerance of enteral feeds (PEG/J tube in place), most recently on elemental formula.  Extensive OP eval and f/u with GI. S/p port placement for TPN and lipids. Continued for ~5 weeks inpatient without considerable improvement. TPN also not good long term option for home and attempting to resume tube feeds.   - Continuing tube feeds, tolerating thus far  - Persistent acidosis as above, metabolic cart showed likely overfeeding on TPN, will consider repeating now that back on tube feeds  - C diff positive (3/13), on fidaxomicin.     ESRD: CT with volume overload secondary to TPN volume, iHD increased from PTA TThSa schedule with unsustained improvement.  Management per Nephrology, dialysis via [facility] C[facility].    - Per Renal now on TTS schedule but unable to remove fluid due to hypotension, consider CRRT while in ICU.     Goals of care: Care conference held with palliative and primary team on 3/15 for medical update with pt. and daughters.  Comfort cares discussed but pt. declining at this time.  Palliative following (our team discussed at length with palliative provider 3/19).    - Tentative plan for care conference for medical update and long term planning in coming days     We appreciate the excellent care provided by the MICU team.  Recommendations communicated via this note.  Will continue to follow along closely, please do not hesitate to call with any questions or concerns.       Ajay Castillo MD           Interval History:   Increased dyspnea, requiring almost continuous BiPAP to maintain saturation.  Intermittent cough.  Unable to expectorate sputum.  No chest pain.              Review of Systems:   C: NEGATIVE for fever, chills  INTEGUMENTARY/SKIN: no rash or obvious new lesions  ENT/MOUTH: no sore throat, new sinus pain or nasal drainage  RESP: see interval history  CV: NEGATIVE for  chest pain, palpitations or peripheral edema  GI: no nausea, vomiting, change in stools  : dialysis  MUSCULOSKELETAL: no myalgias, arthralgias            Medications:      acetylcysteine  2 mL Nebulization 4x daily    amiodarone  400 mg Oral BID    apixaban ANTICOAGULANT  2.5 mg Oral BID    calcium carbonate-vitamin D  1 tablet Per J Tube TID w/meals    [Held by provider] cyanocobalamin  500 mcg Per Feeding Tube Daily    cycloSPORINE modified  175 mg Per G Tube QPM    cycloSPORINE modified  200 mg Per Feeding Tube QAM    heparin lock flush  5-20 mL Intracatheter Q24H    insulin aspart  1-4 Units Subcutaneous Q4H    levalbuterol  1.25 mg Nebulization 4x Daily    levothyroxine  25 mcg Oral QAM AC    lidocaine  1 patch Transdermal Q24h    liothyronine  2.5 mcg Oral BID    metoprolol tartrate  25 mg Per J Tube BID    OLANZapine zydis  5 mg Oral At Bedtime    pantoprazole  40 mg Per J Tube BID    piperacillin-tazobactam  2.25 g Intravenous Q6H    predniSONE  5 mg Per J Tube QAM    And    predniSONE  2.5 mg Per J Tube QPM    [Held by provider] sevelamer carbonate (RENVELA)  0.8 g Oral BID    [Held by provider] sodium bicarbonate  1,300 mg Oral or Feeding Tube TID    sodium chloride (PF)  10 mL Intracatheter Q8H    sodium chloride (PF)  10-40 mL Intracatheter Q8H    sulfamethoxazole-trimethoprim  1 tablet Oral Q Mon Wed Fri AM    vancomycin place blake - receiving intermittent dosing  1 each Intravenous See Admin Instructions     acetaminophen, albuterol, calcium carbonate, artificial tears, dextrose, glucose **OR** dextrose **OR** glucagon, diphenhydrAMINE **OR** diphenhydrAMINE, heparin lock flush, hydrOXYzine HCl, lidocaine 4%, lidocaine (buffered or not buffered), lidocaine-prilocaine, LORazepam, naloxone **OR** naloxone **OR** naloxone **OR** naloxone, - MEDICATION INSTRUCTIONS -, ondansetron **OR** ondansetron, - MEDICATION INSTRUCTIONS -, prochlorperazine **OR** prochlorperazine **OR** prochlorperazine,  senna-docusate **OR** senna-docusate, sodium chloride, sodium chloride (PF), sodium chloride (PF), sodium chloride (PF), sodium chloride 0.9%         Physical Exam:   Temp:  [97.3  F (36.3  C)-99.4  F (37.4  C)] 98.5  F (36.9  C)  Pulse:  [] 104  Resp:  [24-32] 32  BP: ()/(49-65) 91/53  Cuff Mean (mmHg):  [63-67] 67  FiO2 (%):  [35 %-45 %] 45 %  SpO2:  [77 %-98 %] 94 %    Intake/Output Summary (Last 24 hours) at 3/24/2024 0741  Last data filed at 3/23/2024 1700  Gross per 24 hour   Intake 560 ml   Output 0 ml   Net 560 ml     Constitutional:   Awake, alert and in no apparent distress, biPaP in place     Eyes:   nonicteric     Neck:   Supple without supraclavicular or cervical lymphadenopathy     Lungs:   Diminished air flow throughout.  Scattered  crackles. No rhonchi.  No wheezes.     Cardiovascular:   Normal S1 and S2.  TachyC.  No murmur. No gallop. No rub.     Abdomen:   NABS, soft, nondistended, nontender.  No HSM.     Musculoskeletal:   No edema     Neurologic:   Alert and conversant.     Skin:   Warm, dry.  No rash on limited exam.             Data:   All laboratory and imaging data reviewed.    Results for orders placed or performed during the hospital encounter of 02/10/24 (from the past 24 hour(s))   Glucose by meter   Result Value Ref Range    GLUCOSE BY METER POCT 126 (H) 70 - 99 mg/dL   Glucose by meter   Result Value Ref Range    GLUCOSE BY METER POCT 184 (H) 70 - 99 mg/dL   Glucose by meter   Result Value Ref Range    GLUCOSE BY METER POCT 160 (H) 70 - 99 mg/dL   Blood gas venous   Result Value Ref Range    pH Venous 7.11 (LL) 7.32 - 7.43    pCO2 Venous 99 (HH) 40 - 50 mm Hg    pO2 Venous 49 (H) 25 - 47 mm Hg    Bicarbonate Venous 31 (H) 21 - 28 mmol/L    Base Excess/Deficit Venous 0.7 -3.0 - 3.0 mmol/L    FIO2 45     Oxyhemoglobin Venous 76 (H) 70 - 75 %    O2 Sat, Venous 78.4 (H) 70.0 - 75.0 %    Narrative    In healthy individuals, oxyhemoglobin (O2Hb) and oxygen saturation (SO2) are  approximately equal. In the presence of dyshemoglobins, oxyhemoglobin can be considerably lower than oxygen saturation.   XR Chest Port 1 View    Narrative    EXAM: XR CHEST PORT 1 VIEW  3/23/2024 2:02 PM      HISTORY: worsening hypoxic resp failure. Lung tx with CLAD and ESRD on  iHD. ?volume vs infection    COMPARISON: Chest x-ray 3/18/2024, CT chest 3/18/2034    FINDINGS: Single view of the chest. ] IJ central venous catheter tip  terminates at the superior cavoatrial junction. Post surgical changes  in the chest with intact clamshell sternotomy wires. Trachea is  midline. Cardiac silhouette is stable with continued prominent  pulmonary artery and left atrial appendage convexity. Increased  diffuse right greater than left mixed interstitial and airspace  opacities with decreased aeration most notable in the right upper lung  field. Small left greater than right pleural effusions. No appreciable  pneumothorax.      Impression    IMPRESSION:   1. Increased diffuse interstitial and airspace opacities bilaterally,  with notable decreased aeration throughout the right lung, which may  represent worsening pulmonary edema, though underlying infection is  not excluded.  2. Stable small left greater than right pleural effusions.    I have personally reviewed the examination and initial interpretation  and I agree with the findings.    YANNICK BENAVIDEZ MD         SYSTEM ID:  [id number]   Glucose by meter   Result Value Ref Range    GLUCOSE BY METER POCT 148 (H) 70 - 99 mg/dL   Blood gas venous   Result Value Ref Range    pH Venous 7.13 (LL) 7.32 - 7.43    pCO2 Venous 96 (HH) 40 - 50 mm Hg    pO2 Venous 51 (H) 25 - 47 mm Hg    Bicarbonate Venous 32 (H) 21 - 28 mmol/L    Base Excess/Deficit Venous 1.0 -3.0 - 3.0 mmol/L    FIO2 40     Oxyhemoglobin Venous 79 (H) 70 - 75 %    O2 Sat, Venous 81.1 (H) 70.0 - 75.0 %    Narrative    In healthy individuals, oxyhemoglobin (O2Hb) and oxygen saturation (SO2) are approximately equal.  In the presence of dyshemoglobins, oxyhemoglobin can be considerably lower than oxygen saturation.   CT Chest w/o Contrast    Narrative    EXAMINATION: CT CHEST W/O CONTRAST, 3/23/2024 6:57 PM    TECHNIQUE:  Helical CT images from the thoracic inlet through the lung  bases were obtained without IV contrast.     COMPARISON: CT chest 3/18/2024    HISTORY: Worsening hypoxia and CXR. S/p Lungtx and difficulty with  volume removal.    FINDINGS:  Postoperative changes of bilateral lung transplantation. Right upper  extremity PICC with tip terminating in the inferior superior vena  cava.    Chest: Thyroid gland is unremarkable. Heart is enlarged. Main  pulmonary artery is mildly enlarged measuring approximately 3.6 cm.  Thoracic aorta is of normal caliber, there is calcifications of the  aortic arch and branching great vessels. Standard branching pattern.     There is right and left posteriorly predominantly patchy consolidative  opacities throughout all lobes with surrounding groundglass opacity  and air bronchograms present. Consolidative opacity in the upper  lateral left lower lobe measuring approximately 1 cm (series 5 image  93). Paraseptal thickening prominently in the anterior right lung and  bilateral lower lobes. No significant pleural effusions. No  pneumothorax.    Upper abdomen: Limited evaluation of the upper abdomen. No acute  pathology of these organs organs visualized. Consolidative opacity in  the upper lateral Calcifications of the abdominal aorta. Numerous left  renal calculi, nonobstructive.    Bones/soft tissues: No acute osseous abnormalities or suspicious bony  lesions. Clamshell sternotomy wires from transplant.      Impression    IMPRESSION: In this patient status post lung transplant:  1. Scattered consolidative and ground glass opacities throughout all  lobes, likely to represent infectious etiology.  2. Dilated main pulmonary artery, may represent pulmonary  hypertension.    I have personally  reviewed the examination and initial interpretation  and I agree with the findings.    YANNICK BENAVIDEZ MD         SYSTEM ID:  [id number]   Glucose by meter   Result Value Ref Range    GLUCOSE BY METER POCT 138 (H) 70 - 99 mg/dL   PRA Donor Specific Antibody    Narrative    The following orders were created for panel order PRA Donor Specific Antibody.  Procedure                               Abnormality         Status                     ---------                               -----------         ------                     PRA Donor Specific Antibody[[id number]]                      In process                   Please view results for these tests on the individual orders.   Blood gas venous   Result Value Ref Range    pH Venous 7.13 (LL) 7.32 - 7.43    pCO2 Venous 95 (HH) 40 - 50 mm Hg    pO2 Venous 53 (H) 25 - 47 mm Hg    Bicarbonate Venous 32 (H) 21 - 28 mmol/L    Base Excess/Deficit Venous 0.9 -3.0 - 3.0 mmol/L    FIO2 40     Oxyhemoglobin Venous 81 (H) 70 - 75 %    O2 Sat, Venous 83.6 (H) 70.0 - 75.0 %    Narrative    In healthy individuals, oxyhemoglobin (O2Hb) and oxygen saturation (SO2) are approximately equal. In the presence of dyshemoglobins, oxyhemoglobin can be considerably lower than oxygen saturation.   Blood gas venous   Result Value Ref Range    pH Venous 7.18 (LL) 7.32 - 7.43    pCO2 Venous 83 (HH) 40 - 50 mm Hg    pO2 Venous 38 25 - 47 mm Hg    Bicarbonate Venous 31 (H) 21 - 28 mmol/L    Base Excess/Deficit Venous 1.2 -3.0 - 3.0 mmol/L    FIO2 40     Oxyhemoglobin Venous 68 (L) 70 - 75 %    O2 Sat, Venous 70.3 70.0 - 75.0 %    Narrative    In healthy individuals, oxyhemoglobin (O2Hb) and oxygen saturation (SO2) are approximately equal. In the presence of dyshemoglobins, oxyhemoglobin can be considerably lower than oxygen saturation.   Glucose by meter   Result Value Ref Range    GLUCOSE BY METER POCT 107 (H) 70 - 99 mg/dL   Glucose by meter   Result Value Ref Range    GLUCOSE BY METER POCT 164 (H)  70 - 99 mg/dL   CBC with Platelets & Differential    Narrative    The following orders were created for panel order CBC with Platelets & Differential.  Procedure                               Abnormality         Status                     ---------                               -----------         ------                     CBC with platelets and d...[[id number]]  Abnormal            Final result               Manual Differential[[id number]]          Abnormal            Final result                 Please view results for these tests on the individual orders.   INR   Result Value Ref Range    INR 1.22 (H) 0.85 - 1.15   Blood gas venous   Result Value Ref Range    pH Venous 7.13 (LL) 7.32 - 7.43    pCO2 Venous 92 (HH) 40 - 50 mm Hg    pO2 Venous 53 (H) 25 - 47 mm Hg    Bicarbonate Venous 30 (H) 21 - 28 mmol/L    Base Excess/Deficit Venous -0.4 -3.0 - 3.0 mmol/L    FIO2 40     Oxyhemoglobin Venous 78 (H) 70 - 75 %    O2 Sat, Venous 80.3 (H) 70.0 - 75.0 %    Narrative    In healthy individuals, oxyhemoglobin (O2Hb) and oxygen saturation (SO2) are approximately equal. In the presence of dyshemoglobins, oxyhemoglobin can be considerably lower than oxygen saturation.   Comprehensive metabolic panel   Result Value Ref Range    Sodium 134 (L) 135 - 145 mmol/L    Potassium 4.0 3.4 - 5.3 mmol/L    Carbon Dioxide (CO2) 27 22 - 29 mmol/L    Anion Gap 11 7 - 15 mmol/L    Urea Nitrogen 50.0 (H) 8.0 - 23.0 mg/dL    Creatinine 2.93 (H) 0.51 - 0.95 mg/dL    GFR Estimate 18 (L) >60 mL/min/1.73m2    Calcium 8.9 8.8 - 10.2 mg/dL    Chloride 96 (L) 98 - 107 mmol/L    Glucose 156 (H) 70 - 99 mg/dL    Alkaline Phosphatase 134 40 - 150 U/L    AST 16 0 - 45 U/L    ALT 11 0 - 50 U/L    Protein Total 5.5 (L) 6.4 - 8.3 g/dL    Albumin 3.5 3.5 - 5.2 g/dL    Bilirubin Total 0.4 <=1.2 mg/dL   Magnesium   Result Value Ref Range    Magnesium 1.8 1.7 - 2.3 mg/dL   Phosphorus   Result Value Ref Range    Phosphorus 4.9 (H) 2.5 - 4.5 mg/dL   CBC with  platelets and differential   Result Value Ref Range    WBC Count 7.8 4.0 - 11.0 10e3/uL    RBC Count 2.60 (L) 3.80 - 5.20 10e6/uL    Hemoglobin 9.1 (L) 11.7 - 15.7 g/dL    Hematocrit 32.2 (L) 35.0 - 47.0 %     (H) 78 - 100 fL    MCH 35.0 (H) 26.5 - 33.0 pg    MCHC 28.3 (L) 31.5 - 36.5 g/dL    RDW 17.3 (H) 10.0 - 15.0 %    Platelet Count 169 150 - 450 10e3/uL    % Neutrophils      % Lymphocytes      % Monocytes      % Eosinophils      % Basophils      % Immature Granulocytes      NRBCs per 100 WBC 0 <1 /100    Absolute Neutrophils      Absolute Lymphocytes      Absolute Monocytes      Absolute Eosinophils      Absolute Basophils      Absolute Immature Granulocytes      Absolute NRBCs 0.0 10e3/uL   Manual Differential   Result Value Ref Range    % Neutrophils 80 %    % Lymphocytes 7 %    % Monocytes 10 %    % Eosinophils 3 %    % Basophils 0 %    Absolute Neutrophils 6.2 1.6 - 8.3 10e3/uL    Absolute Lymphocytes 0.5 (L) 0.8 - 5.3 10e3/uL    Absolute Monocytes 0.8 0.0 - 1.3 10e3/uL    Absolute Eosinophils 0.2 0.0 - 0.7 10e3/uL    Absolute Basophils 0.0 0.0 - 0.2 10e3/uL    RBC Morphology Confirmed RBC Indices     Platelet Assessment  Automated Count Confirmed. Platelet morphology is normal.     Automated Count Confirmed. Platelet morphology is normal.    Polychromasia Slight (A) None Seen    Stomatocytes Slight (A) None Seen     *Note: Due to a large number of results and/or encounters for the requested time period, some results have not been displayed. A complete set of results can be found in Results Review.

## 2024-03-24 NOTE — ANESTHESIA PROCEDURE NOTES
Airway       Patient location during procedure: ICU       Procedure Start/Stop Times: 3/24/2024 1:30 PM and 3/24/2024 1:40 PM  Staff -        Anesthesiologist:  Adin Barber MD       Resident/Fellow: Sukhdeep Swanson MD       Performed By: resident  Consent for Airway        Urgency: elective  Report Obtained from Primary Care Team       History regarding most recent potassium obtained: Yes       History regarding presence/absence of renal failure obtained:Yes       History regarding stroke/CVA obtained:Yes       History regarding presence/absence of NM disorder: YesIndications and Patient Condition       Indications for airway management: respiratory insufficiency       Mallampati: Not Assessed     Induction type:RSI       Mask difficulty assessment: 0 - not attempted    Final Airway Details       Final airway type: endotracheal airway       Successful airway: ETT - single  Endotracheal Airway Details        ETT size (mm): 8.0       Cuffed: yes       Successful intubation technique: video laryngoscopy       VL Blade Size: MAC 4       Grade View of Cords: 1       Adjucts: stylet       Position: Center       Measured from: gums/teeth       Secured at (cm): 23       Bite block used: None    Post intubation assessment        ETT secured, Vent settings by primary/ICU team, Primary/ICU team to review CXR, Sedation to be ordered by primary/ICU team and No apparent complications       Placement verified by: capnometry, equal breath sounds and chest rise        Number of attempts at approach: 1       Number of other approaches attempted: 0       Secured with: pink tape and commercial tube blake       Ease of procedure: easy       Dentition: Intact and Unchanged    Medication(s) Administered   propofol (DIPRIVAN) injection 10 mg/mL vial - Intravenous   60 mg - 3/24/2024 1:30:00 PM  rocuronium (ZEMURON) 10 mg/mL injection - Intravenous   40 mg - 3/24/2024 1:30:00 PM  Medication Administration Time: 3/24/2024 1:30  PM

## 2024-03-24 NOTE — PROGRESS NOTES
HEMODIALYSIS TREATMENT NOTE    Date: 3/24/2024  Time: 2:48 PM    Data:  Pre Wt: 47.2 kg (104 lb 0.9 oz)   Desired Wt: to   Post Wt: 45.2 kg estimated  Weight change: -2 kg  Ultrafiltration - Post Run Net Total Removed (mL): 2000 mL  Vascular Access Status: AVF patent  Dialyzer Rinse: Streaked, Light  Total Blood Volume Processed: 69 L   Total Dialysis (Treatment) Time: 3.5 hours   Dialysate Bath: K 3, Ca 2.25  Heparin: None    Lab:   No    Interventions:  3.5 hour HD via left upper am AVF at 350 BFR. 1-2 liter UF goal as tolerated. 250 ml albumin prime. 10 mg pre HD midodrine 2 hours prior to HD initiation due to intubation. Pt on mechanical ventilator and pressor (levophed) started prn. See MAR flow sheet and MD orders.     Assessment:  Pt sedated intubated and bronchoscopy performed prior to HD initiation. Pt on 0.03 mcg/kg/min of levophed during HD with 2 liter UF goal programed over 3.5 hours. Pt lung sounds coarse in upper fields anterior bilateral and left lower anterior. Crackle heard upon auscultation in right lower lung field. +2 edema in BLE. Pt tolerating HD well with interventions. Levophed paused at 1 hr 45 into HD, pt BP increasing. BP declining steadily with UF on after levo stopped. Pt tolerated well. Left in room in stable condition and report given  JAYE Worley.      Plan:    Per Renal

## 2024-03-24 NOTE — PHARMACY-VANCOMYCIN DOSING SERVICE
"Pharmacy Vancomycin Note  Date of Service 2024  Patient's  1960   63 year old, female    Indication: Healthcare-Associated Pneumonia  Day of Therapy: 2  Current vancomycin regimen: Intermittent dosing with last dose of vancomycin 1000mg IV once 3/23 2330  Current vancomycin monitoring method: Trough (Method 2 = manual dose calculation)  Current vancomycin therapeutic monitoring goal: 15-20 mg/L    Current estimated CrCl = Estimated Creatinine Clearance: 15 mL/min (A) (based on SCr of 2.93 mg/dL (H)).    Creatinine for last 3 days  3/22/2024:  4:47 AM Creatinine 2.81 mg/dL  3/23/2024:  4:27 AM Creatinine 4.13 mg/dL  3/24/2024:  4:34 AM Creatinine 2.93 mg/dL    Recent Vancomycin Levels (past 3 days)  3/24/2024:  9:29 AM Vancomycin 14.3 ug/mL    Vancomycin IV Administrations (past 72 hours)                     vancomycin (VANCOCIN) 1,000 mg in 200 mL dextrose intermittent infusion (mg) 1,000 mg New Bag 24                    Nephrotoxins and other renal medications (From now, onward)      Start     Dose/Rate Route Frequency Ordered Stop    24 1330  norepinephrine (LEVOPHED) 16 mg in  mL infusion MAX CONC CENTRAL LINE         0.01-0.6 mcg/kg/min × 47.1 kg (Dosing Weight)  0.4-26.5 mL/hr  Intravenous CONTINUOUS 24 1319      24  vancomycin place blake - receiving intermittent dosing         1 each Intravenous SEE ADMIN INSTRUCTIONS 24  cycloSPORINE modified (GENERIC EQUIVALENT) microemulsion solution 175 mg         175 mg Per G Tube EVERY EVENING 24 1810      24  piperacillin-tazobactam (ZOSYN) 2.25 g vial to attach to  ml bag        Note to Pharmacy: For [facility], [facility] and [facility]: For Zosyn-naive patients, use the \"Zosyn initial dose + extended infusion\" order panel.    2.25 g  over 30 Minutes Intravenous EVERY 6 HOURS 24 1936      24 0800  cycloSPORINE modified (GENERIC EQUIVALENT) microemulsion solution 200 " mg         200 mg Per Feeding Tube EVERY MORNING 03/21/24 1525                 Contrast Orders - past 72 hours (72h ago, onward)      None            Interpretation of levels and current regimen:  Vancomycin level is reflective of subtherapeutic level    Urine output:  anuric (last UOP was 50mL on 3/22)    Renal Function: ESRD on Dialysis    Plan:  Give vancomycin 750mg IV once post-HD and will set up pre-HD level before next session.   Vancomycin monitoring method: Trough (Method 2 = manual dose calculation)  Vancomycin therapeutic monitoring goal: 15-20 mg/L  Pharmacy will check vancomycin levels as appropriate in 1-3 Days.  Serum creatinine levels will be ordered daily for the first week of therapy and at least twice weekly for subsequent weeks.    Sindi Srivastava RPH

## 2024-03-24 NOTE — PROGRESS NOTES
Neuro: A&Ox4. Answered all orientation questions correctly on evenings.  Was unsure about taking Atarax, but then agreed.  Atarax given approx 2200.  Patient woke at 0030 and was confused, wanted to go back to her room, asking if there was another patient in the room. Was able to reorient patient and after talking with her a few minutes, she was oriented again and stated that she did not want to take that medicine (Atarax) again. She feels it makes her confused. (Of note, her CO2 improved from 95 at 2030 to 83 at 2300). Patient was very good about wearing the Bipap overnight. Patient took Bipap off 3 times for 30 min or less overnight, otherwise had the Bipap on cont since 1930 last evening.  Cardiac: A-fib, controlled HR, low 100's. BP soft, mostly in the 80's systolic.   Respiratory: Sating low to upper 90's on Bipap. Was on 40% at the start of shift, now on 45% FiO2.  Patient states she feels it is harder to breath. She made this statement x 2, both times were when she took her Bipap off and was on Oxymask. Did not see increased accessory muscles. She did look for comfortable on Bipap. Lung sounds are coarse/crackles.  GI/: Anuric, dialysis. BM X 2, soft brown stool overnight.  Diet/appetite: Tolerating TF at 35 ml/hr with 30 ml FWF q4h. She ate 85% of her dinner last evening at approx 1900. Continues on a calorie count.  Activity:  Continued to have patient on bedrest, d/t respiratory status.  Pain: Denies  Skin: No new deficits noted.  LDA's: Right upper arm double lumen PICC    Plan: Continue with POC. Notify primary team with changes.

## 2024-03-24 NOTE — PROGRESS NOTES
"Transfer  Transferred to:   Via: bed  Reason for transfer:Pt no longer appropriate for 6B- worsened patient condition  Family: Aware of transfer; writer assisted pt in calling her children & grandchildren  Belongings: Packed and sent with pt  Chart: Delivered with pt to next unit  Medications: Meds sent to new unit with pt  Report given to: RIYA Worley    Pt status: A&Ox4. Sating >88% on bipap 45% FiO2 at rest. C/o difficulty breathing, stating \"it's getting heavy.\" When switching between oximask (15L flush) and bipap or with activity, pt can desat rapidly to 60s, recovers in 1-2 minutes with bipap 100% FiO2.     See flowsheet for assessment/vitals. Prior to transferring, writer confirmed with dialysis RN that they were ready for HD upon transfer to  and gave pre-dialysis midodrine.   "

## 2024-03-25 ENCOUNTER — APPOINTMENT (OUTPATIENT)
Dept: GENERAL RADIOLOGY | Facility: CLINIC | Age: 64
DRG: 640 | End: 2024-03-25
Attending: INTERNAL MEDICINE
Payer: MEDICARE

## 2024-03-25 ENCOUNTER — APPOINTMENT (OUTPATIENT)
Dept: PHYSICAL THERAPY | Facility: CLINIC | Age: 64
DRG: 640 | End: 2024-03-25
Attending: INTERNAL MEDICINE
Payer: MEDICARE

## 2024-03-25 LAB
ALBUMIN SERPL BCG-MCNC: 3.1 G/DL (ref 3.5–5.2)
ALP SERPL-CCNC: 93 U/L (ref 40–150)
ALT SERPL W P-5'-P-CCNC: 14 U/L (ref 0–50)
ANION GAP SERPL CALCULATED.3IONS-SCNC: 12 MMOL/L (ref 7–15)
AST SERPL W P-5'-P-CCNC: 16 U/L (ref 0–45)
BASE EXCESS BLDV CALC-SCNC: 4 MMOL/L (ref -3–3)
BASE EXCESS BLDV CALC-SCNC: 6 MMOL/L (ref -3–3)
BASE EXCESS BLDV CALC-SCNC: 7 MMOL/L (ref -3–3)
BASOPHILS # BLD AUTO: ABNORMAL 10*3/UL
BASOPHILS # BLD MANUAL: 0 10E3/UL (ref 0–0.2)
BASOPHILS NFR BLD AUTO: ABNORMAL %
BASOPHILS NFR BLD MANUAL: 0 %
BILIRUB SERPL-MCNC: 0.6 MG/DL
BUN SERPL-MCNC: 34.5 MG/DL (ref 8–23)
C PNEUM DNA SPEC QL NAA+PROBE: NOT DETECTED
CALCIUM SERPL-MCNC: 8.5 MG/DL (ref 8.8–10.2)
CD19 CELLS NFR BRONCH: 1 %
CD3 CELLS NFR BRONCH: 92 %
CD3+CD4+ CELLS NFR BRONCH: 67 %
CD3+CD4+ CELLS/CD3+CD8+ CLL BRONCH: 2.68 %
CD3+CD8+ CELLS NFR BRONCH: 25 %
CD3-CD16+CD56+ CELLS NFR SPEC: 5 %
CHLORIDE SERPL-SCNC: 99 MMOL/L (ref 98–107)
CREAT SERPL-MCNC: 2 MG/DL (ref 0.51–0.95)
DEPRECATED HCO3 PLAS-SCNC: 27 MMOL/L (ref 22–29)
EGFRCR SERPLBLD CKD-EPI 2021: 28 ML/MIN/1.73M2
EOSINOPHIL # BLD AUTO: ABNORMAL 10*3/UL
EOSINOPHIL # BLD MANUAL: 0.5 10E3/UL (ref 0–0.7)
EOSINOPHIL NFR BLD AUTO: ABNORMAL %
EOSINOPHIL NFR BLD MANUAL: 8 %
ERYTHROCYTE [DISTWIDTH] IN BLOOD BY AUTOMATED COUNT: 17.2 % (ref 10–15)
FLUAV H1 2009 PAND RNA SPEC QL NAA+PROBE: NOT DETECTED
FLUAV H1 RNA SPEC QL NAA+PROBE: NOT DETECTED
FLUAV H3 RNA SPEC QL NAA+PROBE: NOT DETECTED
FLUAV RNA SPEC QL NAA+PROBE: NOT DETECTED
FLUBV RNA SPEC QL NAA+PROBE: NOT DETECTED
GLUCOSE BLDC GLUCOMTR-MCNC: 104 MG/DL (ref 70–99)
GLUCOSE BLDC GLUCOMTR-MCNC: 115 MG/DL (ref 70–99)
GLUCOSE BLDC GLUCOMTR-MCNC: 124 MG/DL (ref 70–99)
GLUCOSE BLDC GLUCOMTR-MCNC: 133 MG/DL (ref 70–99)
GLUCOSE BLDC GLUCOMTR-MCNC: 79 MG/DL (ref 70–99)
GLUCOSE SERPL-MCNC: 116 MG/DL (ref 70–99)
HADV DNA SPEC QL NAA+PROBE: NOT DETECTED
HCO3 BLDV-SCNC: 30 MMOL/L (ref 21–28)
HCO3 BLDV-SCNC: 30 MMOL/L (ref 21–28)
HCO3 BLDV-SCNC: 31 MMOL/L (ref 21–28)
HCOV PNL SPEC NAA+PROBE: NOT DETECTED
HCT VFR BLD AUTO: 26.3 % (ref 35–47)
HGB BLD-MCNC: 8 G/DL (ref 11.7–15.7)
HMPV RNA SPEC QL NAA+PROBE: NOT DETECTED
HPIV1 RNA SPEC QL NAA+PROBE: NOT DETECTED
HPIV2 RNA SPEC QL NAA+PROBE: NOT DETECTED
HPIV3 RNA SPEC QL NAA+PROBE: NOT DETECTED
HPIV4 RNA SPEC QL NAA+PROBE: NOT DETECTED
HSV1 DNA SPEC QL NAA+PROBE: NEGATIVE
HSV2 DNA SPEC QL NAA+PROBE: NEGATIVE
IMM GRANULOCYTES # BLD: ABNORMAL 10*3/UL
IMM GRANULOCYTES NFR BLD: ABNORMAL %
INR PPP: 1.5 (ref 0.85–1.15)
LYMPHOCYTE SUBSET BRONCHIAL EXTERNAL COMMENT: NORMAL
LYMPHOCYTES # BLD AUTO: ABNORMAL 10*3/UL
LYMPHOCYTES # BLD MANUAL: 0.8 10E3/UL (ref 0.8–5.3)
LYMPHOCYTES NFR BLD AUTO: ABNORMAL %
LYMPHOCYTES NFR BLD MANUAL: 14 %
M PNEUMO DNA SPEC QL NAA+PROBE: NOT DETECTED
MAGNESIUM SERPL-MCNC: 1.6 MG/DL (ref 1.7–2.3)
MCH RBC QN AUTO: 35.6 PG (ref 26.5–33)
MCHC RBC AUTO-ENTMCNC: 30.4 G/DL (ref 31.5–36.5)
MCV RBC AUTO: 117 FL (ref 78–100)
METAMYELOCYTES # BLD MANUAL: 0.1 10E3/UL
METAMYELOCYTES NFR BLD MANUAL: 2 %
MONOCYTES # BLD AUTO: ABNORMAL 10*3/UL
MONOCYTES # BLD MANUAL: 0.5 10E3/UL (ref 0–1.3)
MONOCYTES NFR BLD AUTO: ABNORMAL %
MONOCYTES NFR BLD MANUAL: 9 %
NEUTROPHILS # BLD AUTO: ABNORMAL 10*3/UL
NEUTROPHILS # BLD MANUAL: 3.9 10E3/UL (ref 1.6–8.3)
NEUTROPHILS NFR BLD AUTO: ABNORMAL %
NEUTROPHILS NFR BLD MANUAL: 67 %
NRBC # BLD AUTO: 0 10E3/UL
NRBC # BLD AUTO: 0.1 10E3/UL
NRBC BLD AUTO-RTO: 0 /100
NRBC BLD MANUAL-RTO: 1 %
O2/TOTAL GAS SETTING VFR VENT: 30 %
O2/TOTAL GAS SETTING VFR VENT: 30 %
O2/TOTAL GAS SETTING VFR VENT: 40 %
OXYHGB MFR BLDV: 68 % (ref 70–75)
OXYHGB MFR BLDV: 69 % (ref 70–75)
OXYHGB MFR BLDV: 73 % (ref 70–75)
PATH REPORT.COMMENTS IMP SPEC: NORMAL
PATH REPORT.FINAL DX SPEC: NORMAL
PATH REPORT.GROSS SPEC: NORMAL
PATH REPORT.MICROSCOPIC SPEC OTHER STN: NORMAL
PATH REPORT.RELEVANT HX SPEC: NORMAL
PCO2 BLDV: 41 MM HG (ref 40–50)
PCO2 BLDV: 43 MM HG (ref 40–50)
PCO2 BLDV: 49 MM HG (ref 40–50)
PH BLDV: 7.39 [PH] (ref 7.32–7.43)
PH BLDV: 7.46 [PH] (ref 7.32–7.43)
PH BLDV: 7.49 [PH] (ref 7.32–7.43)
PHOSPHATE SERPL-MCNC: 2.2 MG/DL (ref 2.5–4.5)
PLAT MORPH BLD: ABNORMAL
PLATELET # BLD AUTO: 179 10E3/UL (ref 150–450)
PO2 BLDV: 34 MM HG (ref 25–47)
PO2 BLDV: 39 MM HG (ref 25–47)
PO2 BLDV: 39 MM HG (ref 25–47)
POLYCHROMASIA BLD QL SMEAR: SLIGHT
POTASSIUM SERPL-SCNC: 3.5 MMOL/L (ref 3.4–5.3)
PROSPERA TRANSPLANT MONITORING: 0.44 %
PROT SERPL-MCNC: 4.9 G/DL (ref 6.4–8.3)
RBC # BLD AUTO: 2.25 10E6/UL (ref 3.8–5.2)
RBC MORPH BLD: ABNORMAL
RSV RNA SPEC QL NAA+PROBE: NOT DETECTED
RSV RNA SPEC QL NAA+PROBE: NOT DETECTED
RV+EV RNA SPEC QL NAA+PROBE: NOT DETECTED
SAO2 % BLDV: 70.2 % (ref 70–75)
SAO2 % BLDV: 70.3 % (ref 70–75)
SAO2 % BLDV: 74.8 % (ref 70–75)
SODIUM SERPL-SCNC: 138 MMOL/L (ref 135–145)
VANCOMYCIN SERPL-MCNC: 18.8 UG/ML
WBC # BLD AUTO: 5.8 10E3/UL (ref 4–11)

## 2024-03-25 PROCEDURE — 258N000003 HC RX IP 258 OP 636

## 2024-03-25 PROCEDURE — 82805 BLOOD GASES W/O2 SATURATION: CPT

## 2024-03-25 PROCEDURE — 84100 ASSAY OF PHOSPHORUS: CPT | Performed by: STUDENT IN AN ORGANIZED HEALTH CARE EDUCATION/TRAINING PROGRAM

## 2024-03-25 PROCEDURE — 85027 COMPLETE CBC AUTOMATED: CPT

## 2024-03-25 PROCEDURE — 250N000012 HC RX MED GY IP 250 OP 636 PS 637

## 2024-03-25 PROCEDURE — 85007 BL SMEAR W/DIFF WBC COUNT: CPT

## 2024-03-25 PROCEDURE — 99233 SBSQ HOSP IP/OBS HIGH 50: CPT | Mod: FS | Performed by: NURSE PRACTITIONER

## 2024-03-25 PROCEDURE — 250N000012 HC RX MED GY IP 250 OP 636 PS 637: Performed by: INTERNAL MEDICINE

## 2024-03-25 PROCEDURE — P9045 ALBUMIN (HUMAN), 5%, 250 ML: HCPCS | Mod: JZ | Performed by: INTERNAL MEDICINE

## 2024-03-25 PROCEDURE — 200N000002 HC R&B ICU UMMC

## 2024-03-25 PROCEDURE — 250N000013 HC RX MED GY IP 250 OP 250 PS 637: Performed by: STUDENT IN AN ORGANIZED HEALTH CARE EDUCATION/TRAINING PROGRAM

## 2024-03-25 PROCEDURE — 250N000009 HC RX 250

## 2024-03-25 PROCEDURE — 250N000013 HC RX MED GY IP 250 OP 250 PS 637

## 2024-03-25 PROCEDURE — 71045 X-RAY EXAM CHEST 1 VIEW: CPT | Mod: 26 | Performed by: RADIOLOGY

## 2024-03-25 PROCEDURE — 99291 CRITICAL CARE FIRST HOUR: CPT | Mod: GC | Performed by: INTERNAL MEDICINE

## 2024-03-25 PROCEDURE — 250N000013 HC RX MED GY IP 250 OP 250 PS 637: Performed by: INTERNAL MEDICINE

## 2024-03-25 PROCEDURE — 83735 ASSAY OF MAGNESIUM: CPT | Performed by: STUDENT IN AN ORGANIZED HEALTH CARE EDUCATION/TRAINING PROGRAM

## 2024-03-25 PROCEDURE — 71045 X-RAY EXAM CHEST 1 VIEW: CPT

## 2024-03-25 PROCEDURE — 85610 PROTHROMBIN TIME: CPT | Performed by: STUDENT IN AN ORGANIZED HEALTH CARE EDUCATION/TRAINING PROGRAM

## 2024-03-25 PROCEDURE — 94640 AIRWAY INHALATION TREATMENT: CPT

## 2024-03-25 PROCEDURE — 250N000011 HC RX IP 250 OP 636: Mod: JZ | Performed by: INTERNAL MEDICINE

## 2024-03-25 PROCEDURE — 999N000157 HC STATISTIC RCP TIME EA 10 MIN

## 2024-03-25 PROCEDURE — 250N000009 HC RX 250: Performed by: STUDENT IN AN ORGANIZED HEALTH CARE EDUCATION/TRAINING PROGRAM

## 2024-03-25 PROCEDURE — 99207 PR NO BILLABLE SERVICE THIS VISIT: CPT | Performed by: NURSE PRACTITIONER

## 2024-03-25 PROCEDURE — 250N000011 HC RX IP 250 OP 636

## 2024-03-25 PROCEDURE — 80202 ASSAY OF VANCOMYCIN: CPT | Performed by: INTERNAL MEDICINE

## 2024-03-25 PROCEDURE — 999N000253 HC STATISTIC WEANING TRIALS

## 2024-03-25 PROCEDURE — 90937 HEMODIALYSIS REPEATED EVAL: CPT

## 2024-03-25 PROCEDURE — 97530 THERAPEUTIC ACTIVITIES: CPT | Mod: GP

## 2024-03-25 PROCEDURE — 94003 VENT MGMT INPAT SUBQ DAY: CPT

## 2024-03-25 PROCEDURE — 94799 UNLISTED PULMONARY SVC/PX: CPT

## 2024-03-25 PROCEDURE — 94640 AIRWAY INHALATION TREATMENT: CPT | Mod: 76

## 2024-03-25 PROCEDURE — 258N000003 HC RX IP 258 OP 636: Performed by: INTERNAL MEDICINE

## 2024-03-25 PROCEDURE — 80053 COMPREHEN METABOLIC PANEL: CPT

## 2024-03-25 RX ORDER — MIDODRINE HYDROCHLORIDE 5 MG/1
10 TABLET ORAL
Status: DISCONTINUED | OUTPATIENT
Start: 2024-03-26 | End: 2024-04-02

## 2024-03-25 RX ORDER — DEXMEDETOMIDINE HYDROCHLORIDE 4 UG/ML
.1-1.2 INJECTION, SOLUTION INTRAVENOUS CONTINUOUS
Status: DISCONTINUED | OUTPATIENT
Start: 2024-03-25 | End: 2024-03-30

## 2024-03-25 RX ORDER — POTASSIUM PHOS IN 0.9 % NACL 15MMOL/250
15 PLASTIC BAG, INJECTION (ML) INTRAVENOUS ONCE
Qty: 250 ML | Refills: 0 | Status: COMPLETED | OUTPATIENT
Start: 2024-03-25 | End: 2024-03-25

## 2024-03-25 RX ORDER — MAGNESIUM SULFATE HEPTAHYDRATE 40 MG/ML
2 INJECTION, SOLUTION INTRAVENOUS ONCE
Status: COMPLETED | OUTPATIENT
Start: 2024-03-25 | End: 2024-03-25

## 2024-03-25 RX ORDER — ALBUMIN (HUMAN) 12.5 G/50ML
50 SOLUTION INTRAVENOUS
Status: DISCONTINUED | OUTPATIENT
Start: 2024-03-25 | End: 2024-03-25

## 2024-03-25 RX ADMIN — PREDNISONE 5 MG: 5 TABLET ORAL at 08:25

## 2024-03-25 RX ADMIN — MAGNESIUM SULFATE IN WATER 2 G: 40 INJECTION, SOLUTION INTRAVENOUS at 08:05

## 2024-03-25 RX ADMIN — PIPERACILLIN AND TAZOBACTAM 2.25 G: 2; .25 INJECTION, POWDER, FOR SOLUTION INTRAVENOUS at 14:13

## 2024-03-25 RX ADMIN — Medication 40 MG: at 21:09

## 2024-03-25 RX ADMIN — PREDNISONE 2.5 MG: 2.5 TABLET ORAL at 21:07

## 2024-03-25 RX ADMIN — CALCIUM CARBONATE 600 MG (1,500 MG)-VITAMIN D3 400 UNIT TABLET 1 TABLET: at 18:06

## 2024-03-25 RX ADMIN — Medication 40 MG: at 09:02

## 2024-03-25 RX ADMIN — APIXABAN 2.5 MG: 2.5 TABLET, FILM COATED ORAL at 08:25

## 2024-03-25 RX ADMIN — CHLORHEXIDINE GLUCONATE 0.12% ORAL RINSE 15 ML: 1.2 LIQUID ORAL at 08:25

## 2024-03-25 RX ADMIN — ACETYLCYSTEINE 2 ML: 100 SOLUTION ORAL; RESPIRATORY (INHALATION) at 16:58

## 2024-03-25 RX ADMIN — LEVALBUTEROL HYDROCHLORIDE 1.25 MG: 1.25 SOLUTION RESPIRATORY (INHALATION) at 12:21

## 2024-03-25 RX ADMIN — POTASSIUM PHOSPHATE, MONOBASIC POTASSIUM PHOSPHATE, DIBASIC 15 MMOL: 224; 236 INJECTION, SOLUTION, CONCENTRATE INTRAVENOUS at 09:27

## 2024-03-25 RX ADMIN — HYDROXYZINE HYDROCHLORIDE 25 MG: 25 TABLET, FILM COATED ORAL at 16:47

## 2024-03-25 RX ADMIN — METOPROLOL TARTRATE 25 MG: 25 TABLET, FILM COATED ORAL at 08:25

## 2024-03-25 RX ADMIN — CALCIUM CARBONATE 600 MG (1,500 MG)-VITAMIN D3 400 UNIT TABLET 1 TABLET: at 08:25

## 2024-03-25 RX ADMIN — ACETYLCYSTEINE 2 ML: 100 SOLUTION ORAL; RESPIRATORY (INHALATION) at 08:16

## 2024-03-25 RX ADMIN — LEVALBUTEROL HYDROCHLORIDE 1.25 MG: 1.25 SOLUTION RESPIRATORY (INHALATION) at 19:55

## 2024-03-25 RX ADMIN — OLANZAPINE 5 MG: 5 TABLET, ORALLY DISINTEGRATING ORAL at 21:22

## 2024-03-25 RX ADMIN — APIXABAN 2.5 MG: 2.5 TABLET, FILM COATED ORAL at 21:07

## 2024-03-25 RX ADMIN — LIDOCAINE: 40 CREAM TOPICAL at 10:29

## 2024-03-25 RX ADMIN — CYCLOSPORINE 200 MG: 100 SOLUTION ORAL at 09:04

## 2024-03-25 RX ADMIN — ALBUMIN HUMAN 250 ML: 0.05 INJECTION, SOLUTION INTRAVENOUS at 11:54

## 2024-03-25 RX ADMIN — Medication 2.5 MCG: at 08:25

## 2024-03-25 RX ADMIN — SODIUM CHLORIDE 300 ML: 9 INJECTION, SOLUTION INTRAVENOUS at 11:55

## 2024-03-25 RX ADMIN — SULFAMETHOXAZOLE AND TRIMETHOPRIM 1 TABLET: 400; 80 TABLET ORAL at 21:07

## 2024-03-25 RX ADMIN — CHLORHEXIDINE GLUCONATE 0.12% ORAL RINSE 15 ML: 1.2 LIQUID ORAL at 21:08

## 2024-03-25 RX ADMIN — Medication: at 11:55

## 2024-03-25 RX ADMIN — ACETYLCYSTEINE 2 ML: 100 SOLUTION ORAL; RESPIRATORY (INHALATION) at 12:21

## 2024-03-25 RX ADMIN — CALCIUM CARBONATE 600 MG (1,500 MG)-VITAMIN D3 400 UNIT TABLET 1 TABLET: at 11:29

## 2024-03-25 RX ADMIN — AMIODARONE HYDROCHLORIDE 400 MG: 200 TABLET ORAL at 21:07

## 2024-03-25 RX ADMIN — CYCLOSPORINE 175 MG: 100 SOLUTION ORAL at 21:21

## 2024-03-25 RX ADMIN — PIPERACILLIN AND TAZOBACTAM 2.25 G: 2; .25 INJECTION, POWDER, FOR SOLUTION INTRAVENOUS at 08:05

## 2024-03-25 RX ADMIN — PROPOFOL 20 MCG/KG/MIN: 10 INJECTION, EMULSION INTRAVENOUS at 10:02

## 2024-03-25 RX ADMIN — LEVALBUTEROL HYDROCHLORIDE 1.25 MG: 1.25 SOLUTION RESPIRATORY (INHALATION) at 16:58

## 2024-03-25 RX ADMIN — LIDOCAINE 4% 1 PATCH: 40 PATCH TOPICAL at 21:07

## 2024-03-25 RX ADMIN — DEXMEDETOMIDINE HYDROCHLORIDE 0.2 MCG/KG/HR: 400 INJECTION INTRAVENOUS at 13:03

## 2024-03-25 RX ADMIN — METOPROLOL TARTRATE 25 MG: 25 TABLET, FILM COATED ORAL at 21:07

## 2024-03-25 RX ADMIN — PIPERACILLIN AND TAZOBACTAM 2.25 G: 2; .25 INJECTION, POWDER, FOR SOLUTION INTRAVENOUS at 01:50

## 2024-03-25 RX ADMIN — ACETYLCYSTEINE 2 ML: 100 SOLUTION ORAL; RESPIRATORY (INHALATION) at 19:55

## 2024-03-25 RX ADMIN — LEVALBUTEROL HYDROCHLORIDE 1.25 MG: 1.25 SOLUTION RESPIRATORY (INHALATION) at 08:16

## 2024-03-25 RX ADMIN — AMIODARONE HYDROCHLORIDE 400 MG: 200 TABLET ORAL at 08:25

## 2024-03-25 RX ADMIN — PIPERACILLIN AND TAZOBACTAM 2.25 G: 2; .25 INJECTION, POWDER, FOR SOLUTION INTRAVENOUS at 21:06

## 2024-03-25 RX ADMIN — LEVOTHYROXINE SODIUM 25 MCG: 0.03 TABLET ORAL at 06:44

## 2024-03-25 RX ADMIN — Medication 2.5 MCG: at 21:09

## 2024-03-25 ASSESSMENT — ACTIVITIES OF DAILY LIVING (ADL)
ADLS_ACUITY_SCORE: 42
ADLS_ACUITY_SCORE: 41
ADLS_ACUITY_SCORE: 42
ADLS_ACUITY_SCORE: 39
ADLS_ACUITY_SCORE: 42
ADLS_ACUITY_SCORE: 41
ADLS_ACUITY_SCORE: 42
ADLS_ACUITY_SCORE: 39
ADLS_ACUITY_SCORE: 42
ADLS_ACUITY_SCORE: 41
ADLS_ACUITY_SCORE: 42
ADLS_ACUITY_SCORE: 39
ADLS_ACUITY_SCORE: 42
ADLS_ACUITY_SCORE: 41
ADLS_ACUITY_SCORE: 39
ADLS_ACUITY_SCORE: 42
ADLS_ACUITY_SCORE: 39
ADLS_ACUITY_SCORE: 42
ADLS_ACUITY_SCORE: 42

## 2024-03-25 NOTE — PROGRESS NOTES
MEDICAL ICU PROGRESS NOTE  03/25/2024      Date of Service (when I saw the patient): 03/25/2024    ASSESSMENT: Sofie Rodriguez is a 63 year old female with PMH COPD s/p bilateral lung transplant 6/28/22 c/b hemidiaphragm palsy and recurrent pneumonias, gastroparesis and small bowel dysmotility complicated by severe malnutrition now s/p PEG/J, ESRD on M/W/F HD, recent R femoral fx s/p ORIF, chronic diarrhea, recurrent c-diff, failure to thrive with inability to tolerate any tube feeds, who was admitted to [facility] on 2/10/24 for concerns over malnutrition and TPN initiation via portacath. Course complicated by recurrent and progressive acute on chronic hypoxic and hypercarbic respiratory failure requiring multiple ICU admissions and intubation 2/18-2/19, 2/28-2/29, 3/18-3/20, for continuous BiPAP. Again with worsening respiratory acidosis and hypercarbia, concern for infection vs acute rejection, requiring transfer back to ICU 3/20/2024 for intubation and bronchoscopy.        CHANGES and MAJOR THINGS TODAY:   - Follow up BAL results  - PST with adjustment to TV and RR based on repeat VBG  - Switch propofol to precedex for PST  - Continue empiric Zosyn, ok to stop vancomycin per transplant pulm  - Schedule midodrine 10mg q8h with dialysis    PLAN:     Neuro:  # Pain and sedation in setting of intubation   Intubated and sedated at goal, awakes to voice. Denies new or acute pain. Uses lidocaine and heating pads as needed for pain management.   - Stop propofol    - Start precedex  - Fentanyl 25  - RASS goal 0 to -1  - Tylenol Q4 prn  - Lidocaine patch prn  - Heating pads prn    #Hx of anxiety   # Claustrophobia  Reports claustrophobia contributing to limited compliance with BiPAP. Has seen health psych on 3/20, recommended grounding exercise (5-4-3-2-1) for use on BiPAP.   - Zyprexa 5mg at bedtime   - Atarax 25mg TID PRN for anxiety  - Ativan 0.25 mg PRN for anxiety     # B/L Neuropathic Pain   New pain b/l  this hospitalization. Last A1c <4.2 (7/11/23). Consider possibly 2/2 ESRD. TSH wnl. B12 and B1 elevated, B6 normal. Copper low (56.3 (), zinc 48.3 (mildly low).  B3 in process.   -Consider gabapentin in the coming days   -Neuro Recs:  - No obvious clinical history or exam findings to suggest neurologic/neuromuscular causes of respiratory weakness  - Neuropathy labs currently pending  - Holding B12 supplement (supra-therapeutic 3/15)     Pulmonary:  # Acute on chronic hypoxic and hypercarbic respiratory failure  # Recurrent PNAs, concern for acute infection vs acute rejection  # S/p BSLT 6/8/22 for COPD  # R hemidiaphragm palsy   # Positive DSA   # Suspected CARLEE  # PTA nocturnal O2, 2L   S/p bilateral lung transplant 6/28/22 for COPD. Was admitted 2/10 to address malnutrition and admitted to ICU on 2/17 with hypoxic hypercarbic respiratory failure. Intubated on 2/18 due to worsening oxygen requirements, likely due to pulmonary edema given hx of ESRD requiring HD vs infection given hx of lung transplant, immunosuppressed status, and recurrent pneumonias. Required intubation 2/17 - 2/19. Ongoing respiratory acidosis despite BiPAP/AVAPS, claustrophobic while using to intermittent compliance.  Neurology consulted and MRI r/o central cause problem for respiratory drive. Started on AVAPS with improvement in mental status and VBG, and patient transferred back to medicine floor on 2/29. 3/08 SNIFF with no definite paradoxical movement of bilateral hemidiaphragm. Transferred back to ICU 3/18-3/20 d/t hypercarbia, severe respiratory acidosis, but did not require intubation during this time. Issues with anxiety/claustrophobia while using the mask, Atarax has helped though Zyprexa led to hallucinations. Patient's baseline appears to be with pH mid 7.2s and pCO2  mid 70-80s at best. Even with pH this low, and pCO2 that high, mentation remains stable. CT chest 3/24 w/ scattered opacity throughout all lungs; dilated pulm  artery. On BiPAP 16/5 at time of transfer. Transplant pulm concerned for infectious vs acute rejection picture, recommending intubation + bronchoscopy. Volume overload + pulmonary edema complicating picture, as patient has had low pressures limiting HD runs.   - Recheck VBG  - Transplant pulm following, appreciate recs               - 3/18 prospera in process  - Immunosuppression:   >Prednisone 5 mg every morning, 2.5 mg every afternoon  >Cyclosporine 200mg BID (Stopped tacrolimus 3/10)   >Overnight oximetry study suggestive of O2 vs CPAP need, as did require up to 2LPM overnight to prevent hypoxia  >Try to minimize O2 to preserve respiratory drive, will give IVIG for DSA+   >D/c'd theophylline 3/3/24 due to difficulty attaining therapeutic levels (started by ICU), could consider Modafinil   >Repeat metabolic CART study ordered by Transplant Pulm   - Airway clearance/nebs:   - Xopenex neb BID  - Hypertonic saline nebs q 3 hours PRN   - Volume removal with iHD               - Will need to place lines for CRRT if unable to run UF  - Sleep clinic eval at discharge for suspected CARLEE  - Limit medications that would depress respiration     #GOC  Care conference on 3/15 with Transplant Pulm, [facility], Medicine, daughters (Julia Nash) and Transplant SW. Overall medical updates provided and Qs answered. With declining respiratory acidosis on labs, discussed code status 3/18. Patient initially not desiring any escalation of her care to ICU/intubation with frustration re overall course. However, after discussing with her daughter on the phone she and family elected to remain full code and agreed to ICU admission and intubation if necessary.   - Transplant Pulm requesting additional care conference in coming days, pending clinical course    Cardiovascular:  # A-flutter (recurrent on 3/17)  # A-fib, intermittent  # HTN  # HFpEF  Noted atrial fibrillation on arrival with HR elevated at 150, not sustained for > 10 minutes and  otherwise hemodynamically stable. RVR resolved w/o medications.  PTA metoprolol (25mg BID) has been held through much of admission. On 3/17 new Aflutter developed overnight. HR 150s with flutter waves on EKG. Metoprolol administered along with Mg infusion with minimal response. Patient was then given amiodarone bolus and placed on drip which slightly lowered her HR in 120-130s. This was held for borderline hypotension 3/18, but restarted again with amiodarone 3/19, transition to current regimen on 3/21. 2/17 TTE: LVEF 55-60%, global RV function normal, no significant valvular abnormalities. Briefly required levophed and midodrine for HD but otherwise stable off pressors.   - MAP goal > 65 mmHg   - Metoprolol tartrate dose 25 mg BID (hold if MAP<65)  - Continue amiodarone 400mg BID PO  - Continuous telemetry     GI/Nutrition:  # Severe malnutrition   # Failure to thrive  # Hypoalbuminemia  # Gastroparesis, small bowel dysmotility  # S/P PEG/J with intolerance of enteral nutrition  #  Chronic osmotic diarrhea/SIBO s/p Rifaximin  # Recurrent C.Diff (3rd ep)    # GERD  Patient with gastroparesis (presumed due to vagal injury) and small bowel dysmotility complicated by unintentional 40lb weight loss over the past year and now severe malnutrition. Previously intolerant to oral food intake due to nausea. Was initially admitted for portacath and TPN initiation since 2/13. After extubation has been able to tolerate feeds without n/v from 2/20.  Per GI, next steps for workup for malnutrition would include CT enterography to evaluate for anatomic abnormalities contributing to malnutrition vs possible treatment for SIBO (though patient has had multiple courses of treatment in the past with no long term improvement). Patient couldn't tolerate the oral load for CT enterography on 2/21, so will defer this until she is able to tolerate greater PO load. On 2/23 , again discussed with patient the need of small bowel motility study if  not improving outpatient through [facility]. No ongoing concerns for SIBO on 2/23 as patient is passing multiple episodes of stools and gas with no abdominal pain or distention. C-diff test negative on 2/21. Per RD, patient tolerating >300 kcal of intake PO currently, so stopped trickle Tube feeds and continuing with PO and TPN for nutrition (sufficient for preventing gut atrophy). As of 3/11 transplant pulmonology is worried that TPN is not a realistic plan to continue at home and would like to transition back to TF (RD oncerned pt is not absorbing any oral intake). Since transitioned off TPN (discontinued 3/16). Transplant pulm also worried about mucosal toxicity. Repeat enteric studies showed recurrent C.diff;  Fidaxomicin x 10 days started (3/13-3/22, GI approved), with improvement in diarrhea. Transplant pulm requesting repeat colonoscopy w/ Bx after completion of c.diff treatment, to r/o toxicity or other reason that diarrhea is present.   - Nutrition consulted, appreciate assistance  - Continue TF at goal rate 35 ml/hr via PEG/J          - Consider reconsult GI week of 3/25/after metabolic CART study, for future colonoscopy +/- biopsy if diarrhea returns   - May benefit from small bowel motility study if not improving outpatient through [facility].   - PPI BID  - Bowel regimen PRN      Renal/Fluids/Electrolytes:  # ESRD on HD  # Hypervolemic hyponatremia, resolved  # Mild hyperphosphatemia  # Anion gap metabolic acidosis - resolved  Patient is ESRD on T/Th/Sat HD as outpt, now approx M/W/F inpatient. Hgb stabilizing. HD tolerating until 3/23. Briefly required extra Monday runs, not since being off TPN. She would prefer longer sessions to more days.  - Schedule midodrine 10mg q8h with dialysis, ok per Nephro  - Currently holding Sevelamer but may need to resume in the coming days per Nephrology.   - CBC and CMP daily  - Strict I/Os  - Daily weight   - Renally adjust medications     Endocrine:  # Hyperglycemia, stress  induced  - Low dose sliding scale insulin  - Hypoglycemia protocol     # Hypothyroidism  Patient with new (24) low T4 at 0.64 and TSH at 6.5, concerning for new hypothyroidism vs sick thyroid syndrome. TSH with appropriate response, more consistent with elevation in the setting of acute illness. ICU team on  recommended initiation of treatment for possible hypothyroid as this can improve respiratory muscle function in the setting of weakness and malnutrition. 3/7, 3/15, 3/20 repeat thyroid function WNL.  - Continue liothyronine + levothyroxine -- note that prolonged combination therapy is not optimal & regimen should be re-evaluated (likely stop liothyronine, up-titrate levothyroxine) in post-acute setting     ID:  # Recurrent pneumonia, concern for acute infection vs rejection  # Recurrent C.Diff colitis (3rd ep)  # Hx EBV viremia   # Hypogammaglobulinemia  # Chronic immunosuppression  lung transplant  Empirically treated for pneumonia  - , RVP and cultures no growth to date. Recurrent C.Diff Positive 3/12, s/p PO Fidaxomicin 200 mg BID for 10 days (3/13-3/22) with improvement in diarrhea. Remains afebrile with mild leukocytosis.  Ig, s/p IVIG.  EBV 27K (decreased compared to prior).     - Follow up BAL and blood cultures from 3/24  - Continue empiric antibiotics as below    Antibiotics:  Zosyn ( - , 3/24-current)  Vancomycin ( - , 3/24-current)  Micafungin (- , 3/24 x1)  Bactrim (MWF, PJP ppx, previously on Dapsone)  Fidaxomicin (3/13-3/22)     Hematology:    #Acute on chronic anemia  ESRD  Hgb stable, with no acute signs of bleeding. Acute drop  from 8.2 > 6.6 after two rechecks, no overt signs of bleeding; required 1U pRBC transfusion.    - Daily CBC  - Transfuse for Hgb < 7  - Continue Epogen with dialysis, held in setting of concern for acute infection   - Continue Venofer 50 mcg qweek with dialysis, held in setting of concern for acute  infection     #Steal physiology of LUE dialysis access fistula  LUE arterial US obtained 2/21 with concern for LUE edema. US of fistula demonstrated steal physiology. Discussed with nephrology, and vascular surgery consulted on 2/22. Vascular surgery has only minor concerns for steal symptoms and given that the AVF is working well, they are okay with outpatient fistulograms/ venoplasty with wrist brachial index and PPG's, unless new concerns arise.  - Plan for outpatient workup as above; nephrology in agreement with outpatient workup.     Musculoskeletal:  # Right hip fracture s/p ORIF (December 2023)   - PT/OT     Skin:  # Left upper extremity unilateral edema, improving   # Bilateral pedal and ankle edema, improving  Edema due to third spacing due to hypoalbuminemia vs HF. BNP >68395 at admission, Echo on 2/18 shows EF of 55-60% with collapsible IVC. Extremity edema 2/2 to hypoalbuminemia. Upper limb duplex USG on 2/18 negative for DVT.  USG left arm on 2/21 shows steal physiology and Vascular Surgery consulted (see Heme section). Overall edema in extremities have improved significantly with dialysis.    - Elevation and wrapping of only lower legs as needed and increased UF per nephrology for volume management; no wrapping of left upper extremity with dialysis fistula     General Cares/Prophylaxis:    DVT Prophylaxis: Apixaban  GI Prophylaxis: PPI  Restraints: N/A  Family Communication: Family updated  Code Status: FULL     Lines/tubes/drains:  - PEG/J  - PICC line in RUE   - PIV x1  - Rectal tube     Disposition:  - Medical ICU      Patient seen and findings/plan discussed with medical ICU staff, Dr. Escobar.     Angelica Mejia, MS4    Resident/Fellow Attestation   I, Ting Aceves MD, was present with the medical/EMILY student who participated in the service and in the documentation of the note.  I have verified the history and personally performed the physical exam and medical decision making.  I agree with the  assessment and plan of care as documented in the note.    Ting Aceves MD  PGY3  Date of Service (when I saw the patient): 03/25/24        Clinically Significant Risk Factors            # Hypomagnesemia: Lowest Mg = 1.6 mg/dL in last 2 days, will replace as needed   # Hypoalbuminemia: Lowest albumin = 2.6 g/dL at 2/18/2024  5:13 AM, will monitor as appropriate  # Coagulation Defect: INR = 1.50 (Ref range: 0.85 - 1.15) and/or PTT = N/A, will monitor for bleeding            # Severe Malnutrition: based on nutrition assessment      # Financial/Environmental Concerns: none            ====================================  INTERVAL HISTORY:   Yesterday pt was intubated and underwent bronchoscopy with BAL on arrival to ICU, without complications. Subsequently underwent HD with 1-2L UF, support with midodrine & levophed, tolerated well. TF restarted.   Overnight, no acute events. Several incontinent loose stools, rectal tube placed. Converted to sinus rhythm.     OBJECTIVE:   1. VITAL SIGNS:   Temp:  [98  F (36.7  C)-100.1  F (37.8  C)] 99.4  F (37.4  C)  Pulse:  [] 76  Resp:  [16-26] 16  BP: ()/() 109/55  FiO2 (%):  [30 %-60 %] 30 %  SpO2:  [93 %-100 %] 97 %  Vent Mode: CMV/AC  (Continuous Mandatory Ventilation/ Assist Control)  FiO2 (%): 30 %  Resp Rate (Set): 16 breaths/min  Tidal Volume (Set, mL): 340 mL  PEEP (cm H2O): 5 cmH2O  Pressure Support (cm H2O): 10 cmH2O  Resp: 16  PIP 27    2. INTAKE/ OUTPUT:   I/O last 3 completed shifts:  In: 1822.19 [P.O.:50; I.V.:862.19; NG/GT:420]  Out: 2175 [Other:2000; Stool:175]  Net -597 past 24h    3. PHYSICAL EXAMINATION:  General: awakens easily to voice  HEENT: Mucous membranes moist  Neuro: Awake and alert, no focal deficits, moving all extremities to command and spontaneously  Pulm/Resp: Coarse breath sounds bilaterally, diminished on R>L, no accessory muscle use  CV: RRR, S1/S2 without m/r/g; pedal pulses 2+ without LE edema  Abdomen: Soft, non-distended,  non-tender  : rectal tube in place  Incisions/Skin: PICC and PEG site without surrounding erythema, no rashes noted    4. LABS:   Venous Blood Gas  Recent Labs   Lab 03/25/24  1005 03/25/24  0426 03/24/24 2119 03/24/24  1831   PHV 7.46* 7.49* 7.50* 7.50*   PCO2V 43 41 41 41   PO2V 39 34 32 28   HCO3V 30* 31* 32* 32*   LINDY 6.0* 7.0* 8.4* 8.1*   O2PER 30 40 40 40     Complete Blood Count   Recent Labs   Lab 03/25/24  0426 03/24/24  0434 03/23/24 0427 03/22/24 0447   WBC 5.8 7.8 9.9 8.9   HGB 8.0* 9.1* 10.0* 10.0*    169 191 180     Basic Metabolic Panel  Recent Labs   Lab 03/25/24  0823 03/25/24  0426 03/25/24  0425 03/24/24  2348 03/24/24 2119 03/24/24  0835 03/24/24  0434 03/23/24  0752 03/23/24 0427   NA  --  138  --   --  138  --  134*  --  138   POTASSIUM  --  3.5  --   --  3.6  --  4.0  --  4.3   CHLORIDE  --  99  --   --  99  --  96*  --  96*   CO2  --  27  --   --  28  --  27  --  29   BUN  --  34.5*  --   --  25.2*  --  50.0*  --  70.0*   CR  --  2.00*  --   --  1.64*  --  2.93*  --  4.13*   GLC 79 116* 104* 121* 106*   < > 156*   < > 161*    < > = values in this interval not displayed.     Liver Function Tests  Recent Labs   Lab 03/25/24 0426 03/24/24  0434 03/23/24 0427 03/22/24 0447   AST 16 16 12 13   ALT 14 11 9 9   ALKPHOS 93 134 139 131   BILITOTAL 0.6 0.4 0.3 0.3   ALBUMIN 3.1* 3.5 3.7 3.6   INR 1.50* 1.22* 1.12 1.06     Coagulation Profile  Recent Labs   Lab 03/25/24  0426 03/24/24  0434 03/23/24  0427 03/22/24  0447   INR 1.50* 1.22* 1.12 1.06     Micro:   - BAL 3/24:   - Cell count w diff: PMN 75%, lymphocytes 5, monocytes 19, eos 1  - No organisms seen on Gram stain  - RVP, HSV, Histoplasma neg  - Fungal & bacterial cultures, Legionella, HSV, AFB in process  - Bcx 3/24 NGTD    5. RADIOLOGY:   Recent Results (from the past 24 hour(s))   XR Chest Port 1 View    Narrative    XR CHEST PORT 1 VIEW  3/24/2024 2:53 PM     HISTORY:  S/p intubation and bronch, for tube placement and  lung  fields       COMPARISON:  3/23/2024 CXR    TECHNIQUE: Portable, semiupright, frontal projection radiograph of the  chest.    FINDINGS:   Postsurgical changes of the chest with intact clamshell sternotomy  wires. Endotracheal tube tip terminates 2.7 cm above the nabil. Right  upper extremity PICC line tip terminates in the right atrium.    Cardiomediastinal silhouette is partially obscured by surrounding  opacities. Trachea is midline. Blunting of the bilateral costophrenic  angles. No appreciable pneumothorax. Prominent convexity of the  pulmonary artery. Largely unchanged appearance of the diffuse hazy  opacification with numerous air bronchograms of the right and left  hemithorax, right greater than left.    Upper abdomen appears normal. No acute osseous abnormalities.      Impression    IMPRESSION:     1. Largely unchanged diffuse hazy opacification of the right and left  hemithorax, right greater than left. This likely represents worsening  pulmonary edema/infection given appearance on CT.  2. Slightly increased small left pleural effusion. Small right pleural  effusion unchanged.  3. Prominence of the pulmonary artery may be seen in pulmonary artery  hypertension.    I have personally reviewed the examination and initial interpretation  and I agree with the findings.    YANNICK BENAVIDEZ MD         SYSTEM ID:  [id number]   XR Chest Port 1 View    Narrative    Exam: XR CHEST PORT 1 VIEW, 3/25/2024 1:20 AM    Comparison: Chest radiograph 3/24/2024 at 2:53 PM    History: S/p intubation, following pulmonary opacities    Technique: Portable AP view of the chest.     Findings:  Endotracheal tube tip projects over the lower thoracic trachea 2.5 cm  above the nabil.  Left upper extremity PICC line with tip at the cavoatrial  junction/proximal right atrium.    Stable enlarged cardiac silhouette. Decreased diffuse mixed  interstitial airspace opacities. Small left pleural effusion. No  pneumothorax. No acute  osseous abnormality. Partially visualized upper  abdomen is within normal limits.      Impression    Impression:   1.  Decreased right greater than left mixed interstitial and airspace  opacities, favoring pulmonary edema and atelectasis.  2.  Stable small left pleural effusion.    I have personally reviewed the examination and initial interpretation  and I agree with the findings.    ISIDRO NOVAK MD         SYSTEM ID:  [id number]

## 2024-03-25 NOTE — PLAN OF CARE
No acute events overnight.    Neuro-arousable to voice and gentle touch, sedated on propofol and fentanyl, denies pain, moves all extremities, follows commands, pupils equal and reactive  CV-flipped into sinus rhythm in the 70s-80s early in the night, no ectopy, Blood pressure MAP goal >65-levophed titrated off-0.03 to maintain goal; pulses palpable throughout, afebrile  Pulm-CMV settings 40%/340/20/5, lungs coarse throughout, suctioning tan secretions  GI-TF @ 35 via J tube with standard FWF, rectal tube inserted after several incontinent loose stools  -aneuric, on HD  Skin-no acute changes  Lines-double lumen PICC, PIV x1  Gtts-fentanyl, propofol    P: continue to monitor respiratory status; pressure support trial.  Treat pain as needed.  Notify MICU of any acute changes/concerns.

## 2024-03-25 NOTE — PLAN OF CARE
Major Shift Events:  Intubated at 1300 and bronched at 1330. Prior to intubation was neuro intact on 45% FiO2 Bipap. Currently sedated on prop/fent. Briefly on levo during dialysis, but needed to be restarted right before shift change. Afib with 110s-120s. Tolerating CMV settings and FiO2 weaned from 60 to 40%. TF restarted. Anuric. Tolerated HD run.     Plan: Obtain more access. Continue plan of care.     For vital signs and complete assessments, please see documentation flowsheets.

## 2024-03-25 NOTE — PROGRESS NOTES
Pulmonary Medicine  Cystic Fibrosis - Lung Transplant Team  Progress Note  2024     Patient: Sofie Rodriguez  MRN: [id number]  : 1960 (age 63 year old)  Transplant: 2022 (Lung), POD#[id number]  Admission date: 2/10/2024    Assessment & Plan:     Sofie Rodriguez is a 63 year old female with h/o COPD s/p BSLT () with course complicated by post-operative hemidiaphragm palsy, recurrent PNAs, positive DSA, EBV viremia, hypogammaglobulinemia, severe gastroparesis s/p G/J tube placement (22) and pyloric botox (23), GI bleed /2 pyloric ulcer, hemobilia s/p ERCP and MRCP, chronic diarrhea, recurrent C diff colitis, ESRD on iHD, recurrent falls with injuries (recent right hip fx s/p ORIF 2023), deconditioning, and FTT.  Admitted 2/10 for failure to thrive and initiation of TPN/lipids.  Emergent intubation - for hypoxic and hypercapneic respiratory failure and encephalopathy. Returned to ICU - and again 3/18-3/20 d/t tenuous respiratory status complicated by variable daytime NIPPV tolerance and hypervolemia with iHD limited d/t hypotension. Again transferred back to ICU 3/24 for intubation and bronch/BAL given hypoxic and hypercapneic respiratory failure. CT with extensive bilateral infiltrate and etiology likely multifactorial including volume overload and infection, possible mucous plugging, ACR or AMR less likely.      Today's recommendations:  - Ventilator management and pressure support trials per MICU  - Follow pendign BAL and Blood cultures  - ABX: Continue Zosyn empirically while awaiting cultures, course TBD.  Agree with stopping Vancomycin 3/25 (MRSA swab negative)  - Continue aggressive airway clearance with Volera (intrapulmonary percussion) QID and nebs: Xopenex and Mucomyst QID  - CSA repeat level 3/26, ordered  - Repeat DSA 3/23 pending  - Prospera (Cell Free DNA) 0.44 (3/18, preliminary) suggests AMR less likely, follow  - Volume removal and dialysis per  nephrology: T/T/S schedule with extra run 3/25, agree with scheduled midodrine on HD days to support BP, consider CRRT while in ICU if difficulty pulling fluid d/t BP  - Tentative plan for care conference for medical update and long term planning per primary sometime this week     Acute on chronic hypoxic/hypercapneic respiratory failure:  S/p bilateral sequential lung transplant for COPD:   Hypoventilation, Suspected CARLEE: CT (2/7) with decreased MARLON opacities but new tree in bud RLL opacities.  PFTs in clinic remained significantly below her baseline.  Baseline hypoxia with 2L NC overnight PTA.  S/p intubation 2/17-2/19 for respiratory decompensation with encephalopathy and severe respiratory acidosis, CT with concern for infection and pulmonary edema given recent addition of TPN nutrition.  Bronch (2/18) with very friable tissue, cutlures only with C. glabrata.  Persistent respiratory failure also complicated by hypoventilation and deconditioning d/t malnutrition.  Returned to ICU 2/28-2/29 d/t tenuous respiratory status complicated by variable daytime NIPPV tolerance.  Neurology consulted 2/27, MRI brain (3/1) without acute intracranial pathology.  SNIFF (3/8) normal.  Metabolic CART suggested overfeeding on TPN.  NIF and FVC continue to downtrend (3/5-3/15) prompting concern for potential neuromuscular cause.  Continues with refractory severe hypercapneic respiratory failure and recurrent intolerance of extended time off NIPPV.  Returned to ICU again 3/18-3/20 d/t tenuous respiratory status complicated by NIPPV tolerance and hypervolemia with iHD limited d/t hypotension (CXR with increased pulmonary edema).  Overall, her PCO2 retention is likely multifactorial with a component being from overfeeding (though remedied with nutrition adjustment), metabolic compensation barrier d/t renal failure, pulmonary edema, bicarb loss with diarrhea, hypoventilation with declining FVC concerning for neuromuscular etiology  (though Neurology consult was not revealing), and NIPPV intolerance due to claustrophobia. Worsening hypoxic and hypercapneic respiratory failure 3/24 and transferred to ICU for intubation. CT chest with extensive bilateral infiltrates markedly increased from previous. Bronch with small L>R sided secretions easily suctioned, BAL with no evidence of DAH, non bloody. Etiology likely multifactorial including volume overload and infection, ACR or AMR less likely (as below).  - Ventilator management and pressure support trials per MICU  - BAL (RML) cultures (3/24) NGTD, follow  - ABX: Continue Zosyn (3/23) empirically while awaiting cultures, course TBD.  Agree with stopping Vancomycin 3/23-3/25 (MRSA swab negative); s/p micafungin 100 mg x1 3/23  - Blood culture (3/24) NGTD  - Continue aggressive airway clearance with Volera (intrapulmonary percussion) QID and nebs: Xopenex and Mucomyst QID  - Nasal pillow with BiPAP after discharge to help with tolerance to overnight BiPAP (unable to use with hospital equipment)     Immunosuppression:  AZA previously stopped 5/2023 d/t low ImmuKnow assay and EBV viremia.  ImmuKnow (2/27) remained low at 88.  Transitioned from [place] to CSA 3/11.  -  mg qAM/175 mg qPM.  Goal 140-170.  Repeat level 3/26, ordered  - Prednisone 5 mg qAM / 2.5 mg qPM     Prophylaxis:   - Bactrim qMWF for PJP ppx (3/13, prior dapsone through 3/11)  - CMV ppx not currently indicated, D+/R+, CMV negative 3/18     Positive DSA: AMR treatment deferred given frailty and stable PFTs.  DSA DQB2 stable to decreased on 3/6 with 5667 mfi.  Most recent cell-free DNA (2/18) mildly elevated at 1.04 (concerning for possible rejection), which was increased from prior level of 0.12 (1/10).  IST increase deferred at that time per Dr. Gong.  S/p IVIG (2/27) for DSA (IgG WNL). Immuknow was 108 (1/10) and 88 on 2/27/24.  - Repeat DSA 3/23 pending  - Prospera (Cell Free DNA) 0.44 (3/18, preliminary) suggests AMR less  likely, follow      EBV viremia: CT CAP (2/7) without lymphadenopathy.  Recent levels improving (3/18) 23k.  - Repeat EBV in one month (~4/18)     Other relevant problems being managed by the primary team:      FTT:  Severe protein calorie malnutrition:  Gastroparesis s/p PEG/J, botox, and G-POEM:  SB hypomotility:  Pyloric ulcer:  Chronic nausea and osmotic diarrhea:  SIBO s/p rifaximin:   Recurrent C diff colitis: Chronic osmotic and infectious diarrhea since transplant with recurrent episodes of C diff.  Notable weight loss (40# in a year) d/t diarrhea, GI dysmotility, and intolerance of enteral feeds (PEG/J tube in place), most recently on elemental formula.  Extensive OP eval and f/u with GI. S/p port placement for TPN and lipids. Continued for ~5 weeks inpatient without considerable improvement. TPN also not good long term option for home and attempting to resume tube feeds.   - Continuing tube feeds, tolerating thus far  - Persistent acidosis as above, metabolic cart showed likely overfeeding on TPN, will consider repeating now that back on tube feeds  - C diff positive (3/13), on fidaxomicin.     ESRD: CT with volume overload secondary to TPN volume, iHD increased from PTA TThSa schedule with unsustained improvement.  Management per Nephrology, dialysis via [facility] CVC.  Unable to remove fluid on 3/23 d/t hypotension, but was able to remove 2L on 3/24 with midodrine.  - Volume removal and dialysis per nephrology: T/T/S schedule with extra run 3/25, agree with scheduled midodrine on HD days to support BP , consider CRRT while in ICU if difficulty pulling fluid d/t BP     Goals of care: Care conference held with palliative and primary team on 3/15 for medical update with pt. and daughters.  Comfort cares discussed but pt. declining at this time.  Palliative following (our team discussed at length with palliative provider 3/19).    - Tentative plan for care conference for medical update and long term planning  in coming days     We appreciate the excellent care provided by the MICU team.  Recommendations communicated via this note.  Will continue to follow along closely, please do not hesitate to call with any questions or concerns.    Patient discussed with Dr. Jason Grayson, APRN, CNP   Inpatient Nurse Practitioner  Pulmonary CF/Transplant       Subjective & Interval History:     Remains intubated in ICU on full vent support: CMV 20/340/5/30 with hypoxia improving from 60% yesterday to 30% today.  Awakens to voice.  Denies pain.  Breathing is comfortable, infrequent cough and notes some chest congestion. Denies abdominal pain or bloating, no reflux.  Ongoing diarrhea, rectal tube placed yesterday.     Review of Systems:     ROS as above, otherwise limited due to intubation and sedation    Physical Exam:     All notes, images, and labs from past 24 hours (at minimum) were reviewed.    Vital signs:  Temp: 99.2  F (37.3  C) Temp src: Axillary BP: 101/51 Pulse: 72   Resp: 16 SpO2: 96 % O2 Device: Mechanical Ventilator Oxygen Delivery: 15 LPM   Weight: 52.6 kg (115 lb 15.4 oz)  I/O:   Intake/Output Summary (Last 24 hours) at 3/25/2024 1301  Last data filed at 3/25/2024 1200  Gross per 24 hour   Intake 2250.33 ml   Output 2200 ml   Net 50.33 ml     Constitutional: Lying in bed, in no apparent distress.   HEENT: Eyes with pink conjunctivae, anicteric.  Orally intubated via ETT  PULM: Good air flow bilaterally.  + crackles t/o, no rhonchi, no wheezes.  Non-labored breathing on Ventilator.  CV: Normal S1 and S2.  RRR.  No murmur, gallop, or rub.  No peripheral edema.   ABD: NABS, soft, nontender, nondistended.    MSK: Moves all extremities. + apparent muscle wasting.   NEURO: Alert and nonverbally conversant by gestures   SKIN: Warm, dry.  No rash on limited exam.   PSYCH: Mood stable.     Data:     LABS    CMP:   Recent Labs   Lab 03/25/24  1141 03/25/24  0823 03/25/24  0426 03/25/24  0425 03/24/24  5417  03/24/24 2119 03/24/24 0835 03/24/24  0434 03/23/24 0752 03/23/24 0427 03/22/24 0453 03/22/24 0447   NA  --   --  138  --   --  138  --  134*  --  138  --  137   POTASSIUM  --   --  3.5  --   --  3.6  --  4.0  --  4.3  --  3.7   CHLORIDE  --   --  99  --   --  99  --  96*  --  96*  --  97*   CO2  --   --  27  --   --  28  --  27  --  29  --  30*   ANIONGAP  --   --  12  --   --  11  --  11  --  13  --  10   * 79 116* 104*   < > 106*   < > 156*   < > 161*   < > 136*   BUN  --   --  34.5*  --   --  25.2*  --  50.0*  --  70.0*  --  43.1*   CR  --   --  2.00*  --   --  1.64*  --  2.93*  --  4.13*  --  2.81*   GFRESTIMATED  --   --  28*  --   --  35*  --  18*  --  12*  --  18*   ESTUARDO  --   --  8.5*  --   --  8.5*  --  8.9  --  9.0  --  8.9   MAG  --   --  1.6*  --   --   --   --  1.8  --  2.2  --  2.3   PHOS  --   --  2.2*  --   --   --   --  4.9*  --  6.8*  --  4.9*   PROTTOTAL  --   --  4.9*  --   --   --   --  5.5*  --  5.8*  --  5.6*   ALBUMIN  --   --  3.1*  --   --   --   --  3.5  --  3.7  --  3.6   BILITOTAL  --   --  0.6  --   --   --   --  0.4  --  0.3  --  0.3   ALKPHOS  --   --  93  --   --   --   --  134  --  139  --  131   AST  --   --  16  --   --   --   --  16  --  12  --  13   ALT  --   --  14  --   --   --   --  11  --  9  --  9    < > = values in this interval not displayed.     CBC:   Recent Labs   Lab 03/25/24  0426 03/24/24  0434 03/23/24 0427 03/22/24  0447   WBC 5.8 7.8 9.9 8.9   RBC 2.25* 2.60* 2.85* 2.84*   HGB 8.0* 9.1* 10.0* 10.0*   HCT 26.3* 32.2* 35.3 34.8*   * 124* 124* 123*   MCH 35.6* 35.0* 35.1* 35.2*   MCHC 30.4* 28.3* 28.3* 28.7*   RDW 17.2* 17.3* 17.3* 17.3*    169 191 180       INR:   Recent Labs   Lab 03/25/24  0426 03/24/24  0434 03/23/24  0427 03/22/24  0447   INR 1.50* 1.22* 1.12 1.06       Glucose:   Recent Labs   Lab 03/25/24  1141 03/25/24  0823 03/25/24  0426 03/25/24  0425 03/24/24  2348 03/24/24  2119   * 79 116* 104* 121* 106*       Blood  "Gas:   Recent Labs   Lab 03/25/24  1005 03/25/24  0426 03/24/24  2119   PHV 7.46* 7.49* 7.50*   PCO2V 43 41 41   PO2V 39 34 32   HCO3V 30* 31* 32*   LINDY 6.0* 7.0* 8.4*   O2PER 30 40 40       Culture Data No results for input(s): \"CULT\" in the last 168 hours.    Virology Data:   Lab Results   Component Value Date    FLUAH1 Not Detected 03/24/2024    FLUAH3 Not Detected 03/24/2024    AB3171 Not Detected 03/24/2024    IFLUB Not Detected 03/24/2024    RSVA Not Detected 03/24/2024    RSVB Not Detected 03/24/2024    PIV1 Not Detected 03/24/2024    PIV2 Not Detected 03/24/2024    PIV3 Not Detected 03/24/2024    HMPV Not Detected 03/24/2024       Historical CMV results (last 3 of prior testing):  Lab Results   Component Value Date    CMVQNT Not Detected 03/18/2024    CMVQNT Not Detected 02/19/2024    CMVQNT Not Detected 02/07/2024     Lab Results   Component Value Date    CMVLOG 3.2 07/12/2023    CMVLOG <2.1 04/19/2023    CMVLOG 3.5 01/25/2023       Urine Studies    Recent Labs   Lab Test 02/18/24  0222 05/18/23  0627   URINEPH 7.5* 5.0   NITRITE Negative Negative   LEUKEST Trace* Moderate*   WBCU 66* 21*       Most Recent [place] Pulmonary Function Testing (FVC/FEV1 only)  FVC-Pre   Date Value Ref Range Status   02/07/2024 1.19 L    01/10/2024 1.12 L    08/29/2023 1.48 L    07/25/2023 1.55 L      FVC-%Pred-Pre   Date Value Ref Range Status   02/07/2024 42 %    01/10/2024 39 %    08/29/2023 53 %    07/25/2023 55 %      FEV1-Pre   Date Value Ref Range Status   02/07/2024 1.13 L    01/10/2024 1.10 L    08/29/2023 1.43 L    07/25/2023 1.54 L      FEV1-%Pred-Pre   Date Value Ref Range Status   02/07/2024 51 %    01/10/2024 49 %    08/29/2023 64 %    07/25/2023 69 %        IMAGING    Recent Results (from the past 48 hour(s))   XR Chest Port 1 View    Narrative    EXAM: XR CHEST PORT 1 VIEW  3/23/2024 2:02 PM      HISTORY: worsening hypoxic resp failure. Lung tx with CLAD and ESRD on  iHD. ?volume vs infection    COMPARISON: Chest " x-ray 3/18/2024, CT chest 3/18/2034    FINDINGS: Single view of the chest. ] IJ central venous catheter tip  terminates at the superior cavoatrial junction. Post surgical changes  in the chest with intact clamshell sternotomy wires. Trachea is  midline. Cardiac silhouette is stable with continued prominent  pulmonary artery and left atrial appendage convexity. Increased  diffuse right greater than left mixed interstitial and airspace  opacities with decreased aeration most notable in the right upper lung  field. Small left greater than right pleural effusions. No appreciable  pneumothorax.      Impression    IMPRESSION:   1. Increased diffuse interstitial and airspace opacities bilaterally,  with notable decreased aeration throughout the right lung, which may  represent worsening pulmonary edema, though underlying infection is  not excluded.  2. Stable small left greater than right pleural effusions.    I have personally reviewed the examination and initial interpretation  and I agree with the findings.    YANNICK BENAVIDEZ MD         SYSTEM ID:  [id number]   CT Chest w/o Contrast    Narrative    EXAMINATION: CT CHEST W/O CONTRAST, 3/23/2024 6:57 PM    TECHNIQUE:  Helical CT images from the thoracic inlet through the lung  bases were obtained without IV contrast.     COMPARISON: CT chest 3/18/2024    HISTORY: Worsening hypoxia and CXR. S/p Lungtx and difficulty with  volume removal.    FINDINGS:  Postoperative changes of bilateral lung transplantation. Right upper  extremity PICC with tip terminating in the inferior superior vena  cava.    Chest: Thyroid gland is unremarkable. Heart is enlarged. Main  pulmonary artery is mildly enlarged measuring approximately 3.6 cm.  Thoracic aorta is of normal caliber, there is calcifications of the  aortic arch and branching great vessels. Standard branching pattern.     There is right and left posteriorly predominantly patchy consolidative  opacities throughout all lobes with  surrounding groundglass opacity  and air bronchograms present. Consolidative opacity in the upper  lateral left lower lobe measuring approximately 1 cm (series 5 image  93). Paraseptal thickening prominently in the anterior right lung and  bilateral lower lobes. No significant pleural effusions. No  pneumothorax.    Upper abdomen: Limited evaluation of the upper abdomen. No acute  pathology of these organs organs visualized. Consolidative opacity in  the upper lateral Calcifications of the abdominal aorta. Numerous left  renal calculi, nonobstructive.    Bones/soft tissues: No acute osseous abnormalities or suspicious bony  lesions. Clamshell sternotomy wires from transplant.      Impression    IMPRESSION: In this patient status post lung transplant:  1. Scattered consolidative and ground glass opacities throughout all  lobes, likely to represent infectious etiology.  2. Dilated main pulmonary artery, may represent pulmonary  hypertension.    I have personally reviewed the examination and initial interpretation  and I agree with the findings.    YANNICK BENAVIDEZ MD         SYSTEM ID:  [id number]   XR Chest Port 1 View    Narrative    XR CHEST PORT 1 VIEW  3/24/2024 2:53 PM     HISTORY:  S/p intubation and bronch, for tube placement and lung  fields       COMPARISON:  3/23/2024 CXR    TECHNIQUE: Portable, semiupright, frontal projection radiograph of the  chest.    FINDINGS:   Postsurgical changes of the chest with intact clamshell sternotomy  wires. Endotracheal tube tip terminates 2.7 cm above the nabil. Right  upper extremity PICC line tip terminates in the right atrium.    Cardiomediastinal silhouette is partially obscured by surrounding  opacities. Trachea is midline. Blunting of the bilateral costophrenic  angles. No appreciable pneumothorax. Prominent convexity of the  pulmonary artery. Largely unchanged appearance of the diffuse hazy  opacification with numerous air bronchograms of the right and  left  hemithorax, right greater than left.    Upper abdomen appears normal. No acute osseous abnormalities.      Impression    IMPRESSION:     1. Largely unchanged diffuse hazy opacification of the right and left  hemithorax, right greater than left. This likely represents worsening  pulmonary edema/infection given appearance on CT.  2. Slightly increased small left pleural effusion. Small right pleural  effusion unchanged.  3. Prominence of the pulmonary artery may be seen in pulmonary artery  hypertension.    I have personally reviewed the examination and initial interpretation  and I agree with the findings.    YANNICK BENAVIDEZ MD         SYSTEM ID:  [id number]   XR Chest Port 1 View    Narrative    Exam: XR CHEST PORT 1 VIEW, 3/25/2024 1:20 AM    Comparison: Chest radiograph 3/24/2024 at 2:53 PM    History: S/p intubation, following pulmonary opacities    Technique: Portable AP view of the chest.     Findings:  Endotracheal tube tip projects over the lower thoracic trachea 2.5 cm  above the nabil.  Left upper extremity PICC line with tip at the cavoatrial  junction/proximal right atrium.    Stable enlarged cardiac silhouette. Decreased diffuse mixed  interstitial airspace opacities. Small left pleural effusion. No  pneumothorax. No acute osseous abnormality. Partially visualized upper  abdomen is within normal limits.      Impression    Impression:   1.  Decreased right greater than left mixed interstitial and airspace  opacities, favoring pulmonary edema and atelectasis.  2.  Stable small left pleural effusion.    I have personally reviewed the examination and initial interpretation  and I agree with the findings.    ISIDRO NOVAK MD         SYSTEM ID:  [id number]

## 2024-03-25 NOTE — PROGRESS NOTES
Nephrology Progress Note  03/25/2024         Assessment & Recommendations:   Sofie Rodriguez is a 63 year old year old female with ESKD, COPD s/p b/l lung transplant in 6/2022 with multiple complications, gastroparesis s/p GJ tube, GIB, chronic diarrhea, recurrent c-diff, FTT with inability to tolerate any tube feeds, R hip fx s/p ORIF 12/2023, admitted on 2/10 for initiation of TPN/lipids, transferred to ICU on 2/17 with worsening mental status and respiratory acidosis in spite of bipap, ultimately intubated on 2/18, being treated for PNA, also with volume overload in setting of high volume TPN. Persistent respiratory acidosis in spite of volume off, transferred to ICU for hypotension and respiratory failure, improved on bipap, now floor status again 3/1. Transferred to ICU 3/18 again with worsening hypercapnic respiratory failure. Transferred to floor 3/20, back to ICU 3/24    # ESKD - TTS, LUE AVG, 3hr, 45.5 kg, [facility], Dr. Andres Fairbanks.  - Had HD Saturday with no fluid off, UF run Sunday for 2L, plan UF run today for ongoing fluid optimization  - Requires EMLA cream an hour before HD  - please avoid PICC which will compromise future dialysis accesses; a midline is much preferred for this patient    # Dialysis access: LUE AVG placed 3-4 months ago, per pt. She had arm swelling and a fistulogram a couple months ago and swelling improved but now has redeveloped.  - US with c/f possible steal syndrome  - per vascular surgery, no concern for steal syndrome, ok to go ahead with fistulogram  - given that AVF is working well on dialysis (450 BFR) and at this time IR is only able to perform fistulograms when AVF isn't working well, will defer to OP for management.     # Persistent respiratory acidosis:   - difficulty tolerating bipap due to claustrophobia, but wearing this lately  - balancing act between giving bicarb to maintain pH in setting of severe persistent respiratory acidosis with bicarb quickly  "converting CO2 and worsening overall status  - recommend stopping PO bicarb, ok to restart for pH < 7.1 but would stop again once pH is 7.2; will continue to provide bicarb with dialysis  - transplant pulm following  - re-intubated 3/24 for bronch/BAL    # Aflutter: on amio and BB  # Volume/BP: Anuric; on metoprolol soln 25 mg bid   - weights are variable, unsure of accuracy; appears close to euvolemia with much improvement in LE edema  - CXR 3/25 with likely pulm edema  - goal 2L UF today     # Nutrition: on [employer] tube feeds     # Anemia 2/2 ESKD  On Venofer 50 qwk, Mircera last dose 1/9/2024  - hgb downdrifting, 8.0 g/dL today  - Will continue Venofer 50 mcg q week (Tuesday)  - continue epogen 8000 units via HD    # BMD:   - Ca 8-9's, phos 2.2, alb 3.1  - sevelamer on hold  - getting phos infusion      Recommendations were communicated to primary team via note     RABIA Moctezuma   Division of Renal Disease and Hypertension  [phone]    Interval History :   Seen bedside, re-intubated yesterday for bronch/BAL. 2L UF yesterday (UF only run). Plan another UF run today, likely HD tomorrow. CXR with pulm edema. Getting phos infusion     Review of Systems:   4 point ROS neg other than as noted above    Physical Exam:   I/O last 3 completed shifts:  In: 1822.19 [P.O.:50; I.V.:862.19; NG/GT:420]  Out: 2175 [Other:2000; Stool:175]   /54 (BP Location: Left leg)   Pulse 78   Temp 99.4  F (37.4  C) (Axillary)   Resp 16   Ht 1.57 m (5' 1.81\")   Wt 52.6 kg (115 lb 15.4 oz)   SpO2 97%   BMI 21.34 kg/m       GENERAL APPEARANCE: on vent  PULM: crackles anteriorly bilaterally  CV: RRR    trace pedal/ankle  GI: soft   INTEGUMENT: no cyanosis, no rashes on exposed skin  NEURO: a/o3  Access Left AVG     Labs:   All labs reviewed by me  Electrolytes/Renal -   Recent Labs   Lab Test 03/25/24  0823 03/25/24  0426 03/25/24  0425 03/24/24  2348 03/24/24  2119 03/24/24  0835 03/24/24  0434 03/23/24  0752 " 03/23/24 0427   NA  --  138  --   --  138  --  134*  --  138   POTASSIUM  --  3.5  --   --  3.6  --  4.0  --  4.3   CHLORIDE  --  99  --   --  99  --  96*  --  96*   CO2  --  27  --   --  28  --  27  --  29   BUN  --  34.5*  --   --  25.2*  --  50.0*  --  70.0*   CR  --  2.00*  --   --  1.64*  --  2.93*  --  4.13*   GLC 79 116* 104*   < > 106*   < > 156*   < > 161*   ESTUARDO  --  8.5*  --   --  8.5*  --  8.9  --  9.0   MAG  --  1.6*  --   --   --   --  1.8  --  2.2   PHOS  --  2.2*  --   --   --   --  4.9*  --  6.8*    < > = values in this interval not displayed.       CBC -   Recent Labs   Lab Test 03/25/24 0426 03/24/24 0434 03/23/24 0427   WBC 5.8 7.8 9.9   HGB 8.0* 9.1* 10.0*    169 191       LFTs -   Recent Labs   Lab Test 03/25/24 0426 03/24/24 0434 03/23/24 0427   ALKPHOS 93 134 139   BILITOTAL 0.6 0.4 0.3   ALT 14 11 9   AST 16 16 12   PROTTOTAL 4.9* 5.5* 5.8*   ALBUMIN 3.1* 3.5 3.7       Iron Panel -   Recent Labs   Lab Test 09/26/22  0555 09/03/22  1039 08/24/22  0810   IRON 54 21* 41   IRONSAT 22 9* 21   CARLOS 769* 343* 334*         Imaging:  All imaging studies reviewed by me.     Current Medications:   acetylcysteine  2 mL Nebulization 4x daily    amiodarone  400 mg Oral BID    apixaban ANTICOAGULANT  2.5 mg Oral BID    calcium carbonate-vitamin D  1 tablet Per J Tube TID w/meals    chlorhexidine  15 mL Mouth/Throat Q12H    [Held by provider] cyanocobalamin  500 mcg Per Feeding Tube Daily    cycloSPORINE modified  175 mg Per G Tube QPM    cycloSPORINE modified  200 mg Per Feeding Tube QAM    heparin lock flush  5-20 mL Intracatheter Q24H    insulin aspart  1-4 Units Subcutaneous Q4H    levalbuterol  1.25 mg Nebulization 4x Daily    levothyroxine  25 mcg Oral QAM AC    lidocaine  1 patch Transdermal Q24h    liothyronine  2.5 mcg Oral BID    metoprolol tartrate  25 mg Per J Tube BID    OLANZapine zydis  5 mg Oral At Bedtime    pantoprazole  40 mg Per J Tube BID    piperacillin-tazobactam  2.25 g  Intravenous Q6H    sodium phosphate  15 mmol Intravenous Once    predniSONE  5 mg Per J Tube QAM    And    predniSONE  2.5 mg Per J Tube QPM    [Held by provider] sevelamer carbonate (RENVELA)  0.8 g Oral BID    [Held by provider] sodium bicarbonate  1,300 mg Oral or Feeding Tube TID    sodium chloride (PF)  10 mL Intracatheter Q8H    sodium chloride (PF)  10-40 mL Intracatheter Q8H    sulfamethoxazole-trimethoprim  1 tablet Oral Q Mon Wed Fri AM    vancomycin place blake - receiving intermittent dosing  1 each Intravenous See Admin Instructions      dextrose      fentaNYL 50 mcg/hr (03/25/24 0600)    - MEDICATION INSTRUCTIONS -      norepinephrine Stopped (03/25/24 0200)    - MEDICATION INSTRUCTIONS -      - MEDICATION INSTRUCTIONS -      propofol 30 mcg/kg/min (03/25/24 0600)    sodium chloride 0.9%       RABIA Moctezuma

## 2024-03-25 NOTE — PROGRESS NOTES
Critical Care Services Progress Note:  I personally examined and evaluated the patient today. I formulated today s plan with the house staff team or resident(s) and agree with the findings and plan in the associated note (see separately attested resident note).   I have evaluated all laboratory values and imaging studies from the past 24 hours.  Summary of hospital course:  63F pmh of COPD s/p BSLT in 2022 with jose post recovery course including recurrent pneumonias, ongoing hypercapnic respiratory failure requiring chronic nippv, chronic diarrhea with recurrent cdiff and chronic small bowel dymotility requiring tpn, now intubated for acute on chronic hypoxemic/hypercapnic respiratory failure with worsening lung infiltrates.   Overnight events/pertinent findings today:  No events overnight.  Unable to obtain history directly due to underlying vented status.    Data  Requiring 40% fio2 and peep5 to maintain sats  Labs (personally reviewed):  lytes ok.  Creat 2.00 c/w known hemodialysis needs, hypomag 1.6--replete, hypophos 2.2--replete, vbg 7.49/41/34 metabolic alkalosis, suspect hyperventilating relative to baseline needs.  Anemia to 8.0--no apparent blood loss suspect chronic illness related.      Assessment/plan:  Acute on chronic hypoxemia/hypercapnic respiratory failure:  requiring mechanical ventilation, inappropriate for extubation this morning.  Will initiate PS trialing today to see if this helps her to re-accumulate some co2 to baseline levels. Vbg suggests hyperventilation this morning.  ESRD:  appreciate renal support.  Continue HD per their direction  S/p lung tranplant:  appreciate transplant surgery support.  Anti-rejection med dosing per their direction  Severe malnutrition: ongoing issue with small bowel dysmotility.  Continues on tube feeding for now    Clinically Significant Risk Factors            # Hypomagnesemia: Lowest Mg = 1.6 mg/dL in last 2 days, will replace as needed   # Hypoalbuminemia:  Lowest albumin = 2.6 g/dL at 2/18/2024  5:13 AM, will monitor as appropriate  # Coagulation Defect: INR = 1.50 (Ref range: 0.85 - 1.15) and/or PTT = N/A, will monitor for bleeding            # Severe Malnutrition: based on nutrition assessment    # Financial/Environmental Concerns: none            Rest per resident note.   I spent a total of 45 minutes (excluding procedure time) personally providing and directing critical care services at the bedside and on the critical care unit for this patient.     Miguel Angel Escobar

## 2024-03-25 NOTE — PHARMACY-VANCOMYCIN DOSING SERVICE
"Pharmacy Vancomycin Note  Date of Service 2024  Patient's  1960   63 year old, female    Indication: [facility]ated Pneumonia  Day of Therapy: 3  Current vancomycin regimen: intermittent dosing  Current vancomycin monitoring method: Trough (Method 2 = manual dose calculation)  Current vancomycin therapeutic monitoring goal: 15-20 mg/L    Renal: iHD    Recent Vancomycin Levels (past 3 days)  3/24/2024:  9:29 AM Vancomycin 14.3 ug/mL  3/25/2024: 10:55 AM Vancomycin 18.8 ug/mL    Vancomycin IV Administrations (past 72 hours)                     vancomycin (VANCOCIN) 750 mg in sodium chloride 0.9 % 250 mL intermittent infusion (mg) 750 mg New Bag 24 193    vancomycin (VANCOCIN) 1,000 mg in 200 mL dextrose intermittent infusion (mg) 1,000 mg New Bag 24 2325                    Nephrotoxins and other renal medications (From now, onward)      Start     Dose/Rate Route Frequency Ordered Stop    24 1330  norepinephrine (LEVOPHED) 16 mg in  mL infusion MAX CONC CENTRAL LINE         0.01-0.6 mcg/kg/min × 47.1 kg (Dosing Weight)  0.4-26.5 mL/hr  Intravenous CONTINUOUS 24 1319      24  cycloSPORINE modified (GENERIC EQUIVALENT) microemulsion solution 175 mg         175 mg Per G Tube EVERY EVENING 24 1810      24  piperacillin-tazobactam (ZOSYN) 2.25 g vial to attach to  ml bag        Note to Pharmacy: For [facility], [facility] and [facility]: For Zosyn-naive patients, use the \"Zosyn initial dose + extended infusion\" order panel.    2.25 g  over 30 Minutes Intravenous EVERY 6 HOURS 24 1936      24 0800  cycloSPORINE modified (GENERIC EQUIVALENT) microemulsion solution 200 mg         200 mg Per Feeding Tube EVERY MORNING 24 1525                 Contrast Orders - past 72 hours (72h ago, onward)      None            Interpretation of levels and current regimen:  Vancomycin level is reflective of therapeutic level    Renal Function:  " Hemodialysis      Plan:   Vancomycin has been d/c'd at the time of this note.  No further levels or doses will be ordered.      Edelmira Cuenca, RaviD, BCPS

## 2024-03-25 NOTE — PLAN OF CARE
Major Shift Events: pt calm, RASS 0 to -1, on precedex. NSR, BP normal. On CMV settings and pressure support attempted today for 2 hours. HD today with 2 L off. Up to chair today for 2 hours, pt tolerated well. Continuous tube feeds.     Plan: transfer to , report given to Cherie MULTANI RN    For vital signs and complete assessments, please see documentation flowsheets.

## 2024-03-25 NOTE — PROGRESS NOTES
HEMODIALYSIS TREATMENT NOTE    Date: 3/25/2024  Time: 1:50 PM    Data:  Pre Wt: 52.6 kg (115 lb 15.4 oz)   Desired Wt: 45.5  kg   Post Wt: 50.6 kg (111 lb 8.8 oz)  Weight change: 2 kg  Ultrafiltration - Post Run Net Total Removed (mL): 2000 mL  Vascular Access Status: Graft  patent  Dialyzer Rinse: Streaked  Total Blood Volume Processed: 0 L   Total Dialysis (Treatment) Time: 2.5 hrs  Dialysate Bath:  N/A (UF only)  Heparin: None    Lab:   No    Interventions:  UF only dilaysis tx initiated after dialysis circuit was primed with 5% albumin for BP support. BP was stable and within desired parameter. Pt tolerated net 2L fluid removal. AVG cannulated with 15g needles. Positive for thrill and bruit. 400 BFR achieved with good pressure. Hemostasis achieved within 10 min. Handoff report given to RIYA Landa.    Assessment:  A&Ox4  Endotracheal tube in place  Calm and cooperative  Communicates via writing   VSS  AVG +T/B     Plan:    Next HD per renal

## 2024-03-25 NOTE — PROGRESS NOTES
Attempted PST x2 in the AM - pt too sleepy - consistently low volumes/minute ventilation with subsequent desaturations. RT flipped pt back to full support each time.     Attempting PST 10/5 @1719 - is more awake and sitting in chair.     Pt remains with volera/nebs QID.   Suctioning small to moderate creamy secretions.

## 2024-03-26 LAB
ALBUMIN SERPL BCG-MCNC: 3.2 G/DL (ref 3.5–5.2)
ALP SERPL-CCNC: 91 U/L (ref 40–150)
ALT SERPL W P-5'-P-CCNC: 11 U/L (ref 0–50)
ANION GAP SERPL CALCULATED.3IONS-SCNC: 14 MMOL/L (ref 7–15)
AST SERPL W P-5'-P-CCNC: 18 U/L (ref 0–45)
BACTERIA BRONCH: NO GROWTH
BASE EXCESS BLDV CALC-SCNC: 2.2 MMOL/L (ref -3–3)
BASE EXCESS BLDV CALC-SCNC: 7.5 MMOL/L (ref -3–3)
BASOPHILS # BLD AUTO: ABNORMAL 10*3/UL
BASOPHILS # BLD MANUAL: 0 10E3/UL (ref 0–0.2)
BASOPHILS NFR BLD AUTO: ABNORMAL %
BASOPHILS NFR BLD MANUAL: 0 %
BILIRUB SERPL-MCNC: 0.5 MG/DL
BUN SERPL-MCNC: 51.1 MG/DL (ref 8–23)
CALCIUM SERPL-MCNC: 9.2 MG/DL (ref 8.8–10.2)
CHLORIDE SERPL-SCNC: 98 MMOL/L (ref 98–107)
CREAT SERPL-MCNC: 2.84 MG/DL (ref 0.51–0.95)
CYCLOSPORINE BLD LC/MS/MS-MCNC: 213 UG/L (ref 50–400)
DACRYOCYTES BLD QL SMEAR: SLIGHT
DEPRECATED HCO3 PLAS-SCNC: 26 MMOL/L (ref 22–29)
EGFRCR SERPLBLD CKD-EPI 2021: 18 ML/MIN/1.73M2
EOSINOPHIL # BLD AUTO: ABNORMAL 10*3/UL
EOSINOPHIL # BLD MANUAL: 0.4 10E3/UL (ref 0–0.7)
EOSINOPHIL NFR BLD AUTO: ABNORMAL %
EOSINOPHIL NFR BLD MANUAL: 7 %
ERYTHROCYTE [DISTWIDTH] IN BLOOD BY AUTOMATED COUNT: 17.3 % (ref 10–15)
GALACTOMANNAN AG BAL QL: NEGATIVE
GALACTOMANNAN AG SPEC IA-ACNC: 0.05
GLUCOSE BLDC GLUCOMTR-MCNC: 107 MG/DL (ref 70–99)
GLUCOSE BLDC GLUCOMTR-MCNC: 132 MG/DL (ref 70–99)
GLUCOSE BLDC GLUCOMTR-MCNC: 136 MG/DL (ref 70–99)
GLUCOSE BLDC GLUCOMTR-MCNC: 142 MG/DL (ref 70–99)
GLUCOSE BLDC GLUCOMTR-MCNC: 144 MG/DL (ref 70–99)
GLUCOSE BLDC GLUCOMTR-MCNC: 146 MG/DL (ref 70–99)
GLUCOSE BLDC GLUCOMTR-MCNC: 91 MG/DL (ref 70–99)
GLUCOSE SERPL-MCNC: 149 MG/DL (ref 70–99)
HCO3 BLDV-SCNC: 28 MMOL/L (ref 21–28)
HCO3 BLDV-SCNC: 33 MMOL/L (ref 21–28)
HCT VFR BLD AUTO: 26.7 % (ref 35–47)
HGB BLD-MCNC: 8.2 G/DL (ref 11.7–15.7)
IMM GRANULOCYTES # BLD: ABNORMAL 10*3/UL
IMM GRANULOCYTES NFR BLD: ABNORMAL %
INR PPP: 1.42 (ref 0.85–1.15)
LYMPHOCYTES # BLD AUTO: ABNORMAL 10*3/UL
LYMPHOCYTES # BLD MANUAL: 0.9 10E3/UL (ref 0.8–5.3)
LYMPHOCYTES NFR BLD AUTO: ABNORMAL %
LYMPHOCYTES NFR BLD MANUAL: 18 %
MAGNESIUM SERPL-MCNC: 2.2 MG/DL (ref 1.7–2.3)
MCH RBC QN AUTO: 35.7 PG (ref 26.5–33)
MCHC RBC AUTO-ENTMCNC: 30.7 G/DL (ref 31.5–36.5)
MCV RBC AUTO: 116 FL (ref 78–100)
MONOCYTES # BLD AUTO: ABNORMAL 10*3/UL
MONOCYTES # BLD MANUAL: 0.3 10E3/UL (ref 0–1.3)
MONOCYTES NFR BLD AUTO: ABNORMAL %
MONOCYTES NFR BLD MANUAL: 5 %
NEUTROPHILS # BLD AUTO: ABNORMAL 10*3/UL
NEUTROPHILS # BLD MANUAL: 3.6 10E3/UL (ref 1.6–8.3)
NEUTROPHILS NFR BLD AUTO: ABNORMAL %
NEUTROPHILS NFR BLD MANUAL: 70 %
NRBC # BLD AUTO: 0 10E3/UL
NRBC BLD AUTO-RTO: 0 /100
O2/TOTAL GAS SETTING VFR VENT: 30 %
O2/TOTAL GAS SETTING VFR VENT: 30 %
OXYHGB MFR BLDV: 69 % (ref 70–75)
OXYHGB MFR BLDV: 72 % (ref 70–75)
PCO2 BLDV: 48 MM HG (ref 40–50)
PCO2 BLDV: 50 MM HG (ref 40–50)
PH BLDV: 7.38 [PH] (ref 7.32–7.43)
PH BLDV: 7.43 [PH] (ref 7.32–7.43)
PHOSPHATE SERPL-MCNC: 5.3 MG/DL (ref 2.5–4.5)
PLAT MORPH BLD: ABNORMAL
PLATELET # BLD AUTO: 188 10E3/UL (ref 150–450)
PO2 BLDV: 40 MM HG (ref 25–47)
PO2 BLDV: 41 MM HG (ref 25–47)
POLYCHROMASIA BLD QL SMEAR: SLIGHT
POTASSIUM SERPL-SCNC: 3.9 MMOL/L (ref 3.4–5.3)
PROT SERPL-MCNC: 5.2 G/DL (ref 6.4–8.3)
RBC # BLD AUTO: 2.3 10E6/UL (ref 3.8–5.2)
RBC MORPH BLD: ABNORMAL
SAO2 % BLDV: 70.7 % (ref 70–75)
SAO2 % BLDV: 73.9 % (ref 70–75)
SODIUM SERPL-SCNC: 138 MMOL/L (ref 135–145)
TME LAST DOSE: NORMAL H
TME LAST DOSE: NORMAL H
WBC # BLD AUTO: 5.2 10E3/UL (ref 4–11)

## 2024-03-26 PROCEDURE — 250N000009 HC RX 250

## 2024-03-26 PROCEDURE — 250N000012 HC RX MED GY IP 250 OP 636 PS 637: Performed by: NURSE PRACTITIONER

## 2024-03-26 PROCEDURE — 94799 UNLISTED PULMONARY SVC/PX: CPT

## 2024-03-26 PROCEDURE — 90937 HEMODIALYSIS REPEATED EVAL: CPT

## 2024-03-26 PROCEDURE — 200N000002 HC R&B ICU UMMC

## 2024-03-26 PROCEDURE — 258N000003 HC RX IP 258 OP 636: Performed by: INTERNAL MEDICINE

## 2024-03-26 PROCEDURE — 99291 CRITICAL CARE FIRST HOUR: CPT | Mod: GC | Performed by: INTERNAL MEDICINE

## 2024-03-26 PROCEDURE — 94640 AIRWAY INHALATION TREATMENT: CPT | Mod: 76

## 2024-03-26 PROCEDURE — 250N000011 HC RX IP 250 OP 636

## 2024-03-26 PROCEDURE — 94003 VENT MGMT INPAT SUBQ DAY: CPT

## 2024-03-26 PROCEDURE — 82040 ASSAY OF SERUM ALBUMIN: CPT

## 2024-03-26 PROCEDURE — 99233 SBSQ HOSP IP/OBS HIGH 50: CPT | Performed by: PHYSICIAN ASSISTANT

## 2024-03-26 PROCEDURE — 250N000012 HC RX MED GY IP 250 OP 636 PS 637

## 2024-03-26 PROCEDURE — 999N000157 HC STATISTIC RCP TIME EA 10 MIN

## 2024-03-26 PROCEDURE — 85007 BL SMEAR W/DIFF WBC COUNT: CPT

## 2024-03-26 PROCEDURE — 83735 ASSAY OF MAGNESIUM: CPT | Performed by: STUDENT IN AN ORGANIZED HEALTH CARE EDUCATION/TRAINING PROGRAM

## 2024-03-26 PROCEDURE — 94640 AIRWAY INHALATION TREATMENT: CPT

## 2024-03-26 PROCEDURE — 250N000013 HC RX MED GY IP 250 OP 250 PS 637

## 2024-03-26 PROCEDURE — 250N000013 HC RX MED GY IP 250 OP 250 PS 637: Performed by: STUDENT IN AN ORGANIZED HEALTH CARE EDUCATION/TRAINING PROGRAM

## 2024-03-26 PROCEDURE — 99207 PR NO BILLABLE SERVICE THIS VISIT: CPT | Performed by: NURSE PRACTITIONER

## 2024-03-26 PROCEDURE — 250N000009 HC RX 250: Performed by: STUDENT IN AN ORGANIZED HEALTH CARE EDUCATION/TRAINING PROGRAM

## 2024-03-26 PROCEDURE — 99233 SBSQ HOSP IP/OBS HIGH 50: CPT | Mod: FS | Performed by: NURSE PRACTITIONER

## 2024-03-26 PROCEDURE — 85610 PROTHROMBIN TIME: CPT | Performed by: STUDENT IN AN ORGANIZED HEALTH CARE EDUCATION/TRAINING PROGRAM

## 2024-03-26 PROCEDURE — 82805 BLOOD GASES W/O2 SATURATION: CPT

## 2024-03-26 PROCEDURE — 85027 COMPLETE CBC AUTOMATED: CPT

## 2024-03-26 PROCEDURE — 84100 ASSAY OF PHOSPHORUS: CPT | Performed by: STUDENT IN AN ORGANIZED HEALTH CARE EDUCATION/TRAINING PROGRAM

## 2024-03-26 PROCEDURE — 80158 DRUG ASSAY CYCLOSPORINE: CPT | Performed by: STUDENT IN AN ORGANIZED HEALTH CARE EDUCATION/TRAINING PROGRAM

## 2024-03-26 PROCEDURE — 634N000001 HC RX 634: Mod: JZ | Performed by: INTERNAL MEDICINE

## 2024-03-26 PROCEDURE — 250N000012 HC RX MED GY IP 250 OP 636 PS 637: Performed by: INTERNAL MEDICINE

## 2024-03-26 PROCEDURE — 999N000185 HC STATISTIC TRANSPORT TIME EA 15 MIN

## 2024-03-26 RX ORDER — CYCLOSPORINE 100 MG/ML
200 SOLUTION ORAL EVERY MORNING
Status: DISCONTINUED | OUTPATIENT
Start: 2024-03-27 | End: 2024-03-27

## 2024-03-26 RX ORDER — ALBUMIN (HUMAN) 12.5 G/50ML
50 SOLUTION INTRAVENOUS
Status: DISCONTINUED | OUTPATIENT
Start: 2024-03-26 | End: 2024-03-27

## 2024-03-26 RX ADMIN — MIDODRINE HYDROCHLORIDE 10 MG: 5 TABLET ORAL at 14:03

## 2024-03-26 RX ADMIN — INSULIN ASPART 1 UNITS: 100 INJECTION, SOLUTION INTRAVENOUS; SUBCUTANEOUS at 00:27

## 2024-03-26 RX ADMIN — EPOETIN ALFA-EPBX 8000 UNITS: 10000 INJECTION, SOLUTION INTRAVENOUS; SUBCUTANEOUS at 12:32

## 2024-03-26 RX ADMIN — SODIUM CHLORIDE 300 ML: 9 INJECTION, SOLUTION INTRAVENOUS at 12:00

## 2024-03-26 RX ADMIN — APIXABAN 2.5 MG: 2.5 TABLET, FILM COATED ORAL at 08:39

## 2024-03-26 RX ADMIN — LIDOCAINE 4% 1 PATCH: 40 PATCH TOPICAL at 20:11

## 2024-03-26 RX ADMIN — ACETYLCYSTEINE 2 ML: 100 SOLUTION ORAL; RESPIRATORY (INHALATION) at 16:56

## 2024-03-26 RX ADMIN — CALCIUM CARBONATE 600 MG (1,500 MG)-VITAMIN D3 400 UNIT TABLET 1 TABLET: at 08:39

## 2024-03-26 RX ADMIN — PIPERACILLIN AND TAZOBACTAM 2.25 G: 2; .25 INJECTION, POWDER, FOR SOLUTION INTRAVENOUS at 20:07

## 2024-03-26 RX ADMIN — PIPERACILLIN AND TAZOBACTAM 2.25 G: 2; .25 INJECTION, POWDER, FOR SOLUTION INTRAVENOUS at 14:02

## 2024-03-26 RX ADMIN — MIDODRINE HYDROCHLORIDE 10 MG: 5 TABLET ORAL at 06:26

## 2024-03-26 RX ADMIN — PREDNISONE 5 MG: 5 TABLET ORAL at 08:39

## 2024-03-26 RX ADMIN — AMIODARONE HYDROCHLORIDE 400 MG: 200 TABLET ORAL at 08:39

## 2024-03-26 RX ADMIN — Medication 40 MG: at 20:10

## 2024-03-26 RX ADMIN — DEXMEDETOMIDINE HYDROCHLORIDE 0.6 MCG/KG/HR: 400 INJECTION INTRAVENOUS at 03:07

## 2024-03-26 RX ADMIN — CYCLOSPORINE 200 MG: 100 SOLUTION ORAL at 08:40

## 2024-03-26 RX ADMIN — INSULIN ASPART 1 UNITS: 100 INJECTION, SOLUTION INTRAVENOUS; SUBCUTANEOUS at 12:40

## 2024-03-26 RX ADMIN — SODIUM CHLORIDE 250 ML: 9 INJECTION, SOLUTION INTRAVENOUS at 12:00

## 2024-03-26 RX ADMIN — OLANZAPINE 5 MG: 5 TABLET, ORALLY DISINTEGRATING ORAL at 21:25

## 2024-03-26 RX ADMIN — ACETYLCYSTEINE 2 ML: 100 SOLUTION ORAL; RESPIRATORY (INHALATION) at 20:34

## 2024-03-26 RX ADMIN — Medication 2.5 MCG: at 20:17

## 2024-03-26 RX ADMIN — CHLORHEXIDINE GLUCONATE 0.12% ORAL RINSE 15 ML: 1.2 LIQUID ORAL at 08:38

## 2024-03-26 RX ADMIN — LIDOCAINE: 40 CREAM TOPICAL at 10:07

## 2024-03-26 RX ADMIN — Medication 40 MG: at 08:40

## 2024-03-26 RX ADMIN — ACETYLCYSTEINE 2 ML: 100 SOLUTION ORAL; RESPIRATORY (INHALATION) at 08:54

## 2024-03-26 RX ADMIN — CALCIUM CARBONATE 600 MG (1,500 MG)-VITAMIN D3 400 UNIT TABLET 1 TABLET: at 12:37

## 2024-03-26 RX ADMIN — ACETYLCYSTEINE 2 ML: 100 SOLUTION ORAL; RESPIRATORY (INHALATION) at 12:55

## 2024-03-26 RX ADMIN — APIXABAN 2.5 MG: 2.5 TABLET, FILM COATED ORAL at 20:10

## 2024-03-26 RX ADMIN — CYCLOSPORINE 175 MG: 100 SOLUTION ORAL at 18:07

## 2024-03-26 RX ADMIN — AMIODARONE HYDROCHLORIDE 400 MG: 200 TABLET ORAL at 20:10

## 2024-03-26 RX ADMIN — LEVALBUTEROL HYDROCHLORIDE 1.25 MG: 1.25 SOLUTION RESPIRATORY (INHALATION) at 20:34

## 2024-03-26 RX ADMIN — Medication 2.5 MCG: at 08:40

## 2024-03-26 RX ADMIN — MIDODRINE HYDROCHLORIDE 10 MG: 5 TABLET ORAL at 21:25

## 2024-03-26 RX ADMIN — DEXMEDETOMIDINE HYDROCHLORIDE 0.3 MCG/KG/HR: 400 INJECTION INTRAVENOUS at 19:42

## 2024-03-26 RX ADMIN — PREDNISONE 2.5 MG: 2.5 TABLET ORAL at 20:10

## 2024-03-26 RX ADMIN — LEVALBUTEROL HYDROCHLORIDE 1.25 MG: 1.25 SOLUTION RESPIRATORY (INHALATION) at 08:54

## 2024-03-26 RX ADMIN — LEVALBUTEROL HYDROCHLORIDE 1.25 MG: 1.25 SOLUTION RESPIRATORY (INHALATION) at 16:56

## 2024-03-26 RX ADMIN — PIPERACILLIN AND TAZOBACTAM 2.25 G: 2; .25 INJECTION, POWDER, FOR SOLUTION INTRAVENOUS at 02:34

## 2024-03-26 RX ADMIN — PIPERACILLIN AND TAZOBACTAM 2.25 G: 2; .25 INJECTION, POWDER, FOR SOLUTION INTRAVENOUS at 08:36

## 2024-03-26 RX ADMIN — INSULIN ASPART 1 UNITS: 100 INJECTION, SOLUTION INTRAVENOUS; SUBCUTANEOUS at 16:17

## 2024-03-26 RX ADMIN — LEVALBUTEROL HYDROCHLORIDE 1.25 MG: 1.25 SOLUTION RESPIRATORY (INHALATION) at 12:55

## 2024-03-26 RX ADMIN — CHLORHEXIDINE GLUCONATE 0.12% ORAL RINSE 15 ML: 1.2 LIQUID ORAL at 20:10

## 2024-03-26 RX ADMIN — CALCIUM CARBONATE 600 MG (1,500 MG)-VITAMIN D3 400 UNIT TABLET 1 TABLET: at 18:07

## 2024-03-26 RX ADMIN — LEVOTHYROXINE SODIUM 25 MCG: 0.03 TABLET ORAL at 06:26

## 2024-03-26 ASSESSMENT — ACTIVITIES OF DAILY LIVING (ADL)
ADLS_ACUITY_SCORE: 42

## 2024-03-26 NOTE — PROGRESS NOTES
HEMODIALYSIS TREATMENT NOTE    Date: 3/26/2024  Time: 3:07 PM    Data:  Pre Wt: 51.7 kg (113 lb 15.7 oz)   Desired Wt: 49.2  kg   Post Wt: 49.2 kg (108 lb 7.5 oz)  Weight change: 2.5 kg  Ultrafiltration - Post Run Net Total Removed (mL): 2500 mL  Vascular Access Status: Graft  patent  Dialyzer Rinse: Clear  Total Blood Volume Processed: 91.7 L   Total Dialysis (Treatment) Time: 3.5   Dialysate Bath: K 3, Ca 2.25  Heparin: None    Lab:   No    Interventions:  Pt was cannulated with 15g needle X 2 @ . 2.5L of fluid net was pulled per order. Epogen administered per order. Pt rinsed back post tx, needles were pulled, new dressings applied, and sites were held for about 10mins to help achieve hemostasis. Hand off report was given to RIYA Crespo.    Assessment:  -Calm & Cooperative  -VSS  -MARIOLA Mentation   -Pt remain trached and vented     Plan:    Per renal

## 2024-03-26 NOTE — PROGRESS NOTES
Pulmonary Medicine  Cystic Fibrosis - Lung Transplant Team  Progress Note  2024     Patient: Sofie Rodriguez  MRN: [id number]  : 1960 (age 63 year old)  Transplant: 2022 (Lung), POD#[id number]  Admission date: 2/10/2024    Assessment & Plan:     Sofie Rodriguez is a 63 year old female with h/o COPD s/p BSLT () with course complicated by post-operative hemidiaphragm palsy, recurrent PNAs, positive DSA, EBV viremia, hypogammaglobulinemia, severe gastroparesis s/p G/J tube placement (22) and pyloric botox (23), GI bleed 2/2 pyloric ulcer, hemobilia s/p ERCP and MRCP, chronic diarrhea, recurrent C diff colitis, ESRD on iHD, recurrent falls with injuries (recent right hip fx s/p ORIF 2023), deconditioning, and FTT.  Admitted 2/10 for failure to thrive and initiation of TPN/lipids.  Emergent intubation - for hypoxic and hypercapneic respiratory failure and encephalopathy. Returned to ICU - and again 3/18-3/20 d/t tenuous respiratory status complicated by variable daytime NIPPV tolerance and hypervolemia with iHD limited d/t hypotension. Again transferred back to ICU 3/24 for intubation and bronch/BAL given hypoxic and hypercapneic respiratory failure. CT with extensive bilateral infiltrate and etiology likely multifactorial including volume overload and infection, possible mucous plugging, ACR or AMR less likely.      Today's recommendations:  - Ventilator management and pressure support trials per MICU  - Follow pendign BAL and Blood cultures  - ABX: Continue Zosyn for 7 day empiric course if cultures remain NGTD  - Continue aggressive airway clearance with Volera (intrapulmonary percussion) QID and nebs: Xopenex and Mucomyst QID  - CSA repeat level 3/27, ordered  - Repeat DSA 3/23 pending  - Volume removal and dialysis per nephrology: now tolerating volume removal with scheduled midodrine on HD days  - Tentative plan for care conference for medical update and long term  planning per primary sometime this week     Acute on chronic hypoxic/hypercapneic respiratory failure:  S/p bilateral sequential lung transplant for COPD:   Hypoventilation, Suspected CARLEE: CT (2/7) with decreased MARLON opacities but new tree in bud RLL opacities.  PFTs in clinic remained significantly below her baseline.  Baseline hypoxia with 2L NC overnight PTA.  S/p intubation 2/17-2/19 for respiratory decompensation with encephalopathy and severe respiratory acidosis, CT with concern for infection and pulmonary edema given recent addition of TPN nutrition.  Bronch (2/18) with very friable tissue, cutlures only with C. glabrata.  Persistent respiratory failure also complicated by hypoventilation and deconditioning d/t malnutrition.  Returned to ICU 2/28-2/29 d/t tenuous respiratory status complicated by variable daytime NIPPV tolerance.  Neurology consulted 2/27, MRI brain (3/1) without acute intracranial pathology.  SNIFF (3/8) normal.  Metabolic CART suggested overfeeding on TPN.  NIF and FVC continue to downtrend (3/5-3/15) prompting concern for potential neuromuscular cause.  Continues with refractory severe hypercapneic respiratory failure and recurrent intolerance of extended time off NIPPV.  Returned to ICU again 3/18-3/20 d/t tenuous respiratory status complicated by NIPPV tolerance and hypervolemia with iHD limited d/t hypotension (CXR with increased pulmonary edema).  Overall, her PCO2 retention is likely multifactorial with a component being from overfeeding (though remedied with nutrition adjustment), metabolic compensation barrier d/t renal failure, pulmonary edema, bicarb loss with diarrhea, hypoventilation with declining FVC concerning for neuromuscular etiology (though Neurology consult was not revealing), and NIPPV intolerance due to claustrophobia. Worsening hypoxic and hypercapneic respiratory failure 3/24 and transferred to ICU for intubation. CT chest with extensive bilateral infiltrates  markedly increased from previous. Bronch with small L>R sided secretions easily suctioned, BAL with no evidence of DAH, non bloody. Etiology likely multifactorial including volume overload and infection, ACR or AMR less likely (as below). Hypoxia improving, tolerating pressure support trials at 12/5 30%.  - Ventilator management and pressure support trials per MICU  - BAL (RML) cultures (3/24) NGTD, follow  - ABX: Continue Zosyn (3/23) for 7 day empiric course if cultures remain NGTD.  Agree with stopping Vancomycin 3/23-3/25 (MRSA swab negative); s/p micafungin 100 mg x1 3/23  - Blood culture (3/24) NGTD  - Continue aggressive airway clearance with Volera (intrapulmonary percussion) QID and nebs: Xopenex and Mucomyst QID  - Nasal pillow with BiPAP after discharge to help with tolerance to overnight BiPAP (unable to use with hospital equipment), RN TXPT CC assisting with obtaining BiPAP machine for home use and deliver to hospital for patient use to ensure tolerance with nasal pillow prior to discharge     Immunosuppression:  AZA previously stopped 5/2023 d/t low ImmuKnow assay and EBV viremia.  ImmuKnow (2/27) remained low at 88.  Transitioned from [place] to CSA 3/11.  -  mg qAM/175 mg qPM.  Goal 140-170.  Repeat level 3/26 difficult to interpret 10 hr level, will repeat level tomorrow for trough level.   - Prednisone 5 mg qAM / 2.5 mg qPM    Prophylaxis:   - Bactrim qMWF for PJP ppx (3/13, prior dapsone through 3/11)  - CMV ppx not currently indicated, D+/R+, CMV negative 3/18     Positive DSA: AMR treatment deferred given frailty and stable PFTs.  DSA DQB2 stable to decreased on 3/6 with 5667 mfi.  Most recent cell-free DNA (2/18) mildly elevated at 1.04 (concerning for possible rejection), which was increased from prior level of 0.12 (1/10).  IST increase deferred at that time per Dr. Gong.  S/p IVIG (2/27) for DSA (IgG WNL). Immuknow was 108 (1/10) and 88 on 2/27/24.  - Repeat DSA 3/23 pending  -  Prospera (Cell Free DNA) 0.44 (3/18) suggests AMR less likely, follow      EBV viremia: CT CAP (2/7) without lymphadenopathy.  Recent levels improving (3/18) 23k.  - Repeat EBV in one month (~4/18)     Other relevant problems being managed by the primary team:      FTT:  Severe protein calorie malnutrition:  Gastroparesis s/p PEG/J, botox, and G-POEM:  SB hypomotility:  Pyloric ulcer:  Chronic nausea and osmotic diarrhea:  SIBO s/p rifaximin:   Recurrent C diff colitis: Chronic osmotic and infectious diarrhea since transplant with recurrent episodes of C diff.  Notable weight loss (40# in a year) d/t diarrhea, GI dysmotility, and intolerance of enteral feeds (PEG/J tube in place), most recently on elemental formula.  Extensive OP eval and f/u with GI. S/p port placement for TPN and lipids. Continued for ~5 weeks inpatient without considerable improvement. TPN also not good long term option for home and attempting to resume tube feeds.   - Continuing tube feeds, tolerating thus far  - Persistent acidosis as above, metabolic cart showed likely overfeeding on TPN, will consider repeating now that back on tube feeds  - C diff positive (3/13), on fidaxomicin.     ESRD: CT with volume overload secondary to TPN volume, iHD increased from PTA TThSa schedule with unsustained improvement.  Management per Nephrology, dialysis via [facility] CVC.  Unable to remove fluid on 3/23 d/t hypotension.  - Volume removal and dialysis per nephrology: T/T/S schedule with extra runs 3/24 and 3/25, now tolerating volume removal with scheduled midodrine on HD days     Goals of care: Care conference held with palliative and primary team on 3/15 for medical update with pt. and daughters.  Comfort cares discussed but pt. declining at this time.  Palliative following (our team discussed at length with palliative provider 3/19).    - Tentative plan for care conference for medical update and long term planning in coming days (?end of week)     We  appreciate the excellent care provided by the MICU team.  Recommendations communicated via this note.  Will continue to follow along closely, please do not hesitate to call with any questions or concerns.     Patient discussed with Dr. Jason Grayson, APRN, CNP   Inpatient Nurse Practitioner  Pulmonary CF/Transplant     Subjective & Interval History:     Remains intubated, tolerated PS at 10/5 30% for 2 hrs yesterday.  And currently on 12/5 30% since this morning, otherwise on CMV 20/340/5/30. Denies pain. Breathing is comfortable, suctioning small/moderate amounts of thick creamy sputum.  Denies abdominal pain or nausea, no reflux. Ongoing diarrhea, rectal tube in place.     Review of Systems:     ROS as above, otherwise limited due to intubation and sedation     Physical Exam:     All notes, images, and labs from past 24 hours (at minimum) were reviewed.    Vital signs:  Temp: 97.7  F (36.5  C) Temp src: Axillary BP: 138/60 Pulse: 59   Resp: 22 SpO2: 100 % O2 Device: Mechanical Ventilator Oxygen Delivery: 15 LPM   Weight: 51.7 kg (113 lb 15.7 oz)  I/O:   Intake/Output Summary (Last 24 hours) at 3/26/2024 1443  Last data filed at 3/26/2024 1400  Gross per 24 hour   Intake 2016.29 ml   Output 475 ml   Net 1541.29 ml     Constitutional: lying in bed, frail appearing, in no apparent distress.   HEENT: Eyes with pink conjunctivae, anicteric.  Orally intubated via ETT   PULM: Good air flow bilaterally.  + crackles t/o, no rhonchi, no wheezes.  Non-labored breathing on Ventilator.   CV: Normal S1 and S2.  RRR.  No murmur, gallop, or rub.  No peripheral edema.   ABD: NABS, soft, nontender, nondistended.    MSK: Moves all extremities. + apparent muscle wasting.   NEURO:  Alert and conversant by gestures and writing  SKIN: Warm, dry.  No rash on limited exam.   PSYCH: Mood stable.     Data:     LABS    CMP:   Recent Labs   Lab 03/26/24  1240 03/26/24  0850 03/26/24  0415 03/26/24  0313 03/25/24  0823  03/25/24 0426 03/24/24  2348 03/24/24 2119 03/24/24  0835 03/24/24  0434 03/23/24  0752 03/23/24 0427   NA  --   --   --  138  --  138  --  138  --  134*  --  138   POTASSIUM  --   --   --  3.9  --  3.5  --  3.6  --  4.0  --  4.3   CHLORIDE  --   --   --  98  --  99  --  99  --  96*  --  96*   CO2  --   --   --  26  --  27  --  28  --  27  --  29   ANIONGAP  --   --   --  14  --  12  --  11  --  11  --  13   * 132* 136* 149*   < > 116*   < > 106*   < > 156*   < > 161*   BUN  --   --   --  51.1*  --  34.5*  --  25.2*  --  50.0*  --  70.0*   CR  --   --   --  2.84*  --  2.00*  --  1.64*  --  2.93*  --  4.13*   GFRESTIMATED  --   --   --  18*  --  28*  --  35*  --  18*  --  12*   ESTUARDO  --   --   --  9.2  --  8.5*  --  8.5*  --  8.9  --  9.0   MAG  --   --   --  2.2  --  1.6*  --   --   --  1.8  --  2.2   PHOS  --   --   --  5.3*  --  2.2*  --   --   --  4.9*  --  6.8*   PROTTOTAL  --   --   --  5.2*  --  4.9*  --   --   --  5.5*  --  5.8*   ALBUMIN  --   --   --  3.2*  --  3.1*  --   --   --  3.5  --  3.7   BILITOTAL  --   --   --  0.5  --  0.6  --   --   --  0.4  --  0.3   ALKPHOS  --   --   --  91  --  93  --   --   --  134  --  139   AST  --   --   --  18  --  16  --   --   --  16  --  12   ALT  --   --   --  11  --  14  --   --   --  11  --  9    < > = values in this interval not displayed.     CBC:   Recent Labs   Lab 03/26/24  0313 03/25/24  0426 03/24/24  0434 03/23/24 0427   WBC 5.2 5.8 7.8 9.9   RBC 2.30* 2.25* 2.60* 2.85*   HGB 8.2* 8.0* 9.1* 10.0*   HCT 26.7* 26.3* 32.2* 35.3   * 117* 124* 124*   MCH 35.7* 35.6* 35.0* 35.1*   MCHC 30.7* 30.4* 28.3* 28.3*   RDW 17.3* 17.2* 17.3* 17.3*    179 169 191       INR:   Recent Labs   Lab 03/26/24  0313 03/25/24  0426 03/24/24  0434 03/23/24  0427   INR 1.42* 1.50* 1.22* 1.12       Glucose:   Recent Labs   Lab 03/26/24  1240 03/26/24  0850 03/26/24  0415 03/26/24  0313 03/26/24  0026 03/25/24  2124   * 132* 136* 149* 144* 133*  "      Blood Gas:   Recent Labs   Lab 03/26/24  0313 03/25/24  1637 03/25/24  1005   PHV 7.38 7.39 7.46*   PCO2V 48 49 43   PO2V 40 39 39   HCO3V 28 30* 30*   LINDY 2.2 4.0* 6.0*   O2PER 30 30 30       Culture Data No results for input(s): \"CULT\" in the last 168 hours.    Virology Data:   Lab Results   Component Value Date    FLUAH1 Not Detected 03/24/2024    FLUAH3 Not Detected 03/24/2024    AX0894 Not Detected 03/24/2024    IFLUB Not Detected 03/24/2024    RSVA Not Detected 03/24/2024    RSVB Not Detected 03/24/2024    PIV1 Not Detected 03/24/2024    PIV2 Not Detected 03/24/2024    PIV3 Not Detected 03/24/2024    HMPV Not Detected 03/24/2024       Historical CMV results (last 3 of prior testing):  Lab Results   Component Value Date    CMVQNT Not Detected 03/18/2024    CMVQNT Not Detected 02/19/2024    CMVQNT Not Detected 02/07/2024     Lab Results   Component Value Date    CMVLOG 3.2 07/12/2023    CMVLOG <2.1 04/19/2023    CMVLOG 3.5 01/25/2023       Urine Studies    Recent Labs   Lab Test 02/18/24  0222 05/18/23  0627   URINEPH 7.5* 5.0   NITRITE Negative Negative   LEUKEST Trace* Moderate*   WBCU 66* 21*       Most Recent [place]ze Pulmonary Function Testing (FVC/FEV1 only)  FVC-Pre   Date Value Ref Range Status   02/07/2024 1.19 L    01/10/2024 1.12 L    08/29/2023 1.48 L    07/25/2023 1.55 L      FVC-%Pred-Pre   Date Value Ref Range Status   02/07/2024 42 %    01/10/2024 39 %    08/29/2023 53 %    07/25/2023 55 %      FEV1-Pre   Date Value Ref Range Status   02/07/2024 1.13 L    01/10/2024 1.10 L    08/29/2023 1.43 L    07/25/2023 1.54 L      FEV1-%Pred-Pre   Date Value Ref Range Status   02/07/2024 51 %    01/10/2024 49 %    08/29/2023 64 %    07/25/2023 69 %        IMAGING    Recent Results (from the past 48 hour(s))   XR Chest Port 1 View    Narrative    XR CHEST PORT 1 VIEW  3/24/2024 2:53 PM     HISTORY:  S/p intubation and bronch, for tube placement and lung  fields       COMPARISON:  3/23/2024 " CXR    TECHNIQUE: Portable, semiupright, frontal projection radiograph of the  chest.    FINDINGS:   Postsurgical changes of the chest with intact clamshell sternotomy  wires. Endotracheal tube tip terminates 2.7 cm above the nabil. Right  upper extremity PICC line tip terminates in the right atrium.    Cardiomediastinal silhouette is partially obscured by surrounding  opacities. Trachea is midline. Blunting of the bilateral costophrenic  angles. No appreciable pneumothorax. Prominent convexity of the  pulmonary artery. Largely unchanged appearance of the diffuse hazy  opacification with numerous air bronchograms of the right and left  hemithorax, right greater than left.    Upper abdomen appears normal. No acute osseous abnormalities.      Impression    IMPRESSION:     1. Largely unchanged diffuse hazy opacification of the right and left  hemithorax, right greater than left. This likely represents worsening  pulmonary edema/infection given appearance on CT.  2. Slightly increased small left pleural effusion. Small right pleural  effusion unchanged.  3. Prominence of the pulmonary artery may be seen in pulmonary artery  hypertension.    I have personally reviewed the examination and initial interpretation  and I agree with the findings.    YANNICK BENAVIDEZ MD         SYSTEM ID:  [id number]   XR Chest Port 1 View    Narrative    Exam: XR CHEST PORT 1 VIEW, 3/25/2024 1:20 AM    Comparison: Chest radiograph 3/24/2024 at 2:53 PM    History: S/p intubation, following pulmonary opacities    Technique: Portable AP view of the chest.     Findings:  Endotracheal tube tip projects over the lower thoracic trachea 2.5 cm  above the nabil.  Left upper extremity PICC line with tip at the cavoatrial  junction/proximal right atrium.    Stable enlarged cardiac silhouette. Decreased diffuse mixed  interstitial airspace opacities. Small left pleural effusion. No  pneumothorax. No acute osseous abnormality. Partially visualized  upper  abdomen is within normal limits.      Impression    Impression:   1.  Decreased right greater than left mixed interstitial and airspace  opacities, favoring pulmonary edema and atelectasis.  2.  Stable small left pleural effusion.    I have personally reviewed the examination and initial interpretation  and I agree with the findings.    ISIDRO NOVAK MD         SYSTEM ID:  [id number]

## 2024-03-26 NOTE — PROGRESS NOTES
Critical Care Services Progress Note:  I personally examined and evaluated the patient today. I formulated today s plan with the house staff team or resident(s) and agree with the findings and plan in the associated note (see separately attested resident note).   I have evaluated all laboratory values and imaging studies from the past 24 hours.  Summary of hospital course:  63F pmh of COPD s/p BSLT in 2022 with jose post recovery course including recurrent pneumonias, ongoing hypercapnic respiratory failure requiring chronic nippv, chronic diarrhea with recurrent cdiff and chronic small bowel dymotility requiring tpn, now intubated for acute on chronic hypoxemic/hypercapnic respiratory failure with worsening lung infiltrates.   Overnight events/pertinent findings today:  No events overnight.  Unable to obtain history directly due to underlying vented status.  Data  Requiring 30% fio2 and peep5 to maintain sats  Labs (personally reviewed):  lytes ok.  Creat 2.84 c/w known hemodialysis needs, hypomag 2.2--replete,   Anemia to 8.2--no apparent blood loss suspect chronic illness related.      Assessment/plan:  Acute on chronic hypoxemia/hypercapnic respiratory failure:  requiring mechanical ventilation, inappropriate for extubation this morning.  Blood gas shows appropriate normalization of her pH.   ESRD:  appreciate renal support.  Continue HD per their direction  S/p lung tranplant:  appreciate transplant surgery support.  Anti-rejection med dosing per their direction  Severe malnutrition: ongoing issue with small bowel dysmotility.  Continues on tube feeding for now    Clinically Significant Risk Factors            # Hypomagnesemia: Lowest Mg = 1.6 mg/dL in last 2 days, will replace as needed   # Hypoalbuminemia: Lowest albumin = 2.6 g/dL at 2/18/2024  5:13 AM, will monitor as appropriate    # Coagulation Defect: INR = 1.42 (Ref range: 0.85 - 1.15) and/or PTT = N/A, will monitor for bleeding            # Severe  Malnutrition: based on nutrition assessment    # Financial/Environmental Concerns: none            Rest per resident note.   I spent a total of 40 minutes (excluding procedure time) personally providing and directing critical care services at the bedside and on the critical care unit for this patient.     Miguel Angel Escobar

## 2024-03-26 NOTE — CONSULTS
SPIRITUAL HEALTH SERVICES Consult Note  [facility] ([place] 4C - MICU     Brief  visit with pt to introduce myself to her as unit , assess needs and offer support. Pt is intubated, awake, interactive. Pt declined  support, will request it if ever desired. Plan: check in on pt again when extubated.     Lucio Curran M.Div ([place]).  Staff   Pager [phone]      * [facility] remains available 24/7 for emergent requests/referrals, either by having the switchboard page the on-call  or by entering an ASAP/STAT consult in Epic (this will also page the on-call ). Routine Epic consults receive an initial response within 24 hours.*

## 2024-03-26 NOTE — PROGRESS NOTES
MEDICAL ICU PROGRESS NOTE  03/26/2024      Date of Service (when I saw the patient): 03/26/2024    ASSESSMENT: Sofie Rodriguez is a 63 year old female with PMH COPD s/p bilateral lung transplant 6/28/22 c/b hemidiaphragm palsy and recurrent pneumonias, gastroparesis and small bowel dysmotility complicated by severe malnutrition now s/p PEG/J, ESRD on M/W/F HD, recent R femoral fx s/p ORIF, chronic diarrhea, recurrent c-diff, failure to thrive with inability to tolerate any tube feeds, who was admitted to [facility] on 2/10/24 for concerns over malnutrition and TPN initiation via portacath. Course complicated by recurrent and progressive acute on chronic hypoxic and hypercarbic respiratory failure requiring multiple ICU admissions and intubation 2/18-2/19, 2/28-2/29, 3/18-3/20, for continuous BiPAP. Again with worsening respiratory acidosis and hypercarbia, concern for infection vs acute rejection, requiring transfer back to ICU 3/20/2024 for intubation and bronchoscopy.        CHANGES and MAJOR THINGS TODAY:   - PST again today, currently on 12/8 but will wean as per RT protocol as able  - Continue abx for HAP, pending progression of respiratory status care conference next week per Transplant Pulm, after course of abx to determine GOC     PLAN:     Neuro:  # Pain and sedation in setting of intubation   Intubated and awake, alert. Denies new or acute pain. Uses lidocaine and heating pads as needed for pain management.   - Precedex to achieve sedation goal, wean as tolerates   - RASS goal 0 to -1  - Tylenol Q4 prn  - Lidocaine patch prn  - Heating pads prn    #Hx of anxiety   # Claustrophobia  Reports claustrophobia contributing to limited compliance with BiPAP. Has seen health psych on 3/20, recommended grounding exercise (5-4-3-2-1) for use on BiPAP.   - Zyprexa 5mg at bedtime   - Atarax 25mg TID PRN for anxiety  - Ativan 0.25 mg PRN for anxiety     # B/L Neuropathic Pain   New pain b/l this  hospitalization. Last A1c <4.2 (7/11/23). Consider possibly 2/2 ESRD. TSH wnl. B12 and B1 elevated, B6 normal. Copper low (56.3), zinc mildly low (48.3).  B3 in process.   - Consider gabapentin in the coming days   - Neuro Recs:  - No obvious clinical history or exam findings to suggest neurologic/neuromuscular causes of respiratory weakness  - Neuropathy labs currently pending  - Holding B12 supplement (supra-therapeutic 3/15)     Pulmonary:  # Acute on chronic hypoxic and hypercarbic respiratory failure  # Recurrent PNAs, concern for acute infection vs acute rejection  # S/p BSLT 6/8/22 for COPD  # R hemidiaphragm palsy   # Positive DSA   # Suspected CARLEE  # PTA nocturnal O2, 2L   S/p bilateral lung transplant 6/28/22 for COPD. Was admitted 2/10 to address malnutrition and admitted to ICU on 2/17 with hypoxic hypercarbic respiratory failure. Intubated on 2/18 due to worsening oxygen requirements, likely due to pulmonary edema given hx of ESRD requiring HD vs infection given hx of lung transplant, immunosuppressed status, and recurrent pneumonias. Required intubation 2/17 - 2/19. Ongoing respiratory acidosis despite BiPAP/AVAPS, claustrophobic while using to intermittent compliance.  Neurology consulted and MRI r/o central cause problem for respiratory drive. Started on AVAPS with improvement in mental status and VBG, and patient transferred back to medicine floor on 2/29. 3/08 SNIFF with no definite paradoxical movement of bilateral hemidiaphragm. Transferred back to ICU 3/18-3/20 d/t hypercarbia, severe respiratory acidosis, but did not require intubation during this time. Issues with anxiety/claustrophobia while using the mask, Atarax has helped though Zyprexa led to hallucinations. Patient's baseline appears to be with pH mid 7.2s and pCO2  mid 70-80s at best. Even with pH this low, and pCO2 that high, mentation remains stable. CT chest 3/24 w/ scattered opacity throughout all lungs; dilated pulm artery. On  BiPAP 16/5 at time of transfer. Transplant pulm concerned for infectious vs acute rejection picture, recommending intubation + bronchoscopy. Volume overload + pulmonary edema complicating picture, as patient has had low pressures limiting HD runs.   - Daily VBG  - Transplant pulm following, appreciate recs               - 3/18 prospera in process  - Immunosuppression:   >Prednisone 5 mg every morning, 2.5 mg every afternoon  >Cyclosporine 200mg BID (Stopped tacrolimus 3/10)   >Overnight oximetry study suggestive of O2 vs CPAP need, as did require up to 2LPM overnight to prevent hypoxia  >Try to minimize O2 to preserve respiratory drive, will give IVIG for DSA+   >D/c'd theophylline 3/3/24 due to difficulty attaining therapeutic levels (started by ICU), could consider Modafinil   >Repeat metabolic CART study ordered by Transplant Pulm   - Airway clearance/nebs:   - Xopenex neb BID  - Hypertonic saline nebs q 3 hours PRN   - Volume removal with iHD               - Will need to place lines for CRRT if unable to run UF  - Sleep clinic eval at discharge for suspected CARLEE  - Limit medications that would depress respiration     #GOC  Care conference on 3/15 with Transplant Pulm, \[facility] [place] [facility]\"" Care, Medicine, daughters (Julia Nash) and Transplant SW. Overall medical updates provided and Qs answered. With declining respiratory acidosis on labs, discussed code status 3/18. Patient initially not desiring any escalation of her care to ICU/intubation with frustration re overall course. However, after discussing with her daughter on the phone she and family elected to remain full code and agreed to ICU admission and intubation if necessary.   - Transplant Pulm requesting additional care conference in coming days, pending clinical course    Cardiovascular:  # A-flutter (recurrent on 3/17)  # A-fib, intermittent  # HTN  # HFpEF  Noted atrial fibrillation on arrival with HR elevated at 150, not sustained for > 10 minutes and otherwise  hemodynamically stable. RVR resolved w/o medications.  PTA metoprolol (25mg BID) has been held through much of admission. On 3/17 new Aflutter developed overnight. HR 150s with flutter waves on EKG. Metoprolol administered along with Mg infusion with minimal response. Patient was then given amiodarone bolus and placed on drip which slightly lowered her HR in 120-130s. This was held for borderline hypotension 3/18, but restarted again with amiodarone 3/19, transition to current regimen on 3/21. 2/17 TTE: LVEF 55-60%, global RV function normal, no significant valvular abnormalities. Briefly required levophed and midodrine for HD but otherwise stable off pressors.   - MAP goal > 65 mmHg   - Metoprolol tartrate dose 25 mg BID (hold if MAP<65)  - Continue amiodarone 400mg BID PO  - Midodrine with HD  - Continuous telemetry     GI/Nutrition:  # Severe malnutrition   # Failure to thrive  # Hypoalbuminemia  # Gastroparesis, small bowel dysmotility  # S/P PEG/J with intolerance of enteral nutrition  #  Chronic osmotic diarrhea/SIBO s/p Rifaximin  # Recurrent C.Diff (3rd ep 3/12/24)    # GERD  Patient with gastroparesis (presumed due to vagal injury) and small bowel dysmotility complicated by unintentional 40lb weight loss over the past year and now severe malnutrition. Previously intolerant to oral food intake due to nausea. Was initially admitted for portacath and TPN initiation since 2/13. Intermittently tolerating feeds without n/v from 2/20.  Per GI, no ongoing concerns for SIBO as of 2/23. As of 3/11 transplant pulmonology is worried that TPN is not a realistic plan to continue at home and would like to transition back to TF (RD oncerned pt is not absorbing any oral intake). TPN discontinued 3/16. Transplant pulm also worried about mucosal toxicity. Recurrent C.diff 3/12, Fidaxomicin x 10 days (3/13-3/22), with improvement in diarrhea. Transplant pulm requesting repeat colonoscopy w/ Bx after completion of c.diff  treatment, to r/o toxicity or other reason that diarrhea is present.   - Nutrition consulted, appreciate assistance  - Continue TF at goal rate 35 ml/hr via PEG/J          - Consider reconsult GI week of 3/25/after metabolic CART study, for future colonoscopy +/- biopsy if diarrhea returns   - PPI BID  - Bowel regimen PRN      Renal/Fluids/Electrolytes:  # ESRD on HD  # Hypervolemic hyponatremia, resolved  # Mild hyperphosphatemia  # Anion gap metabolic acidosis - resolved  Patient is ESRD on T/Th/Sat HD as outpt, now approx M/W/F inpatient. Hgb stabilizing. HD tolerating until 3/23. Briefly required extra Monday runs, not since being off TPN. She would prefer longer sessions to more days.  - Midodrine 10mg q8h with dialysis days   - Currently holding Sevelamer but may need to resume in the coming days per Nephrology   - CBC and CMP daily  - Strict I/Os  - Daily weight   - Renally adjust medications     Endocrine:  # Hyperglycemia, stress induced  - Low dose sliding scale insulin  - Hypoglycemia protocol     # Hypothyroidism  Patient with new (2/17/24) low T4 at 0.64 and TSH at 6.5, concerning for new hypothyroidism vs sick thyroid syndrome. TSH with appropriate response, more consistent with elevation in the setting of acute illness. ICU team on 2/28 recommended initiation of treatment for possible hypothyroid as this can improve respiratory muscle function in the setting of weakness and malnutrition. 3/7, 3/15, 3/20 repeat thyroid function WNL.  - Continue liothyronine + levothyroxine -- note that prolonged combination therapy is not optimal & regimen should be re-evaluated (likely stop liothyronine, up-titrate levothyroxine) in post-acute setting     ID:  # Recurrent pneumonia, concern for acute infection vs rejection  # Recurrent C.Diff colitis (3rd ep 3/12/24)  # Hx EBV viremia   # Hypogammaglobulinemia  # Chronic immunosuppression 2/2 lung transplant  Empirically treated for pneumonia 2/17 - 2/23, RVP and  cultures no growth to date. Recurrent C.Diff Positive 3/12, s/p PO Fidaxomicin 200 mg BID for 10 days (3/13-3/22) with improvement in diarrhea. Remains afebrile, leukocytosis resolved.  Ig, s/p IVIG.  EBV 27K (decreased compared to prior).     - Follow up BAL and blood cultures from 3/24  - Continue empiric antibiotic as below    Antibiotics:  Zosyn ( - , 3/24-current)  Bactrim (MWF, PJP ppx, previously on Dapsone)  Vancomycin ( - , 3/24-3/25)  Micafungin (- , 3/24 x1)  Fidaxomicin (3/13-3/22)     Hematology:    #Acute on chronic anemia  ESRD  Hgb stable, with no acute signs of bleeding. Acute drop  from 8.2 > 6.6 after two rechecks, no overt signs of bleeding; required 1U pRBC transfusion.    - Daily CBC  - Transfuse for Hgb < 7  - Continue Epogen with dialysis, held in setting of concern for acute infection   - Continue Venofer 50 mcg qweek with dialysis, held in setting of concern for acute infection     #Steal physiology of LUE dialysis access fistula  LUE arterial US obtained  with concern for LUE edema. US of fistula demonstrated steal physiology. Discussed with nephrology, and vascular surgery consulted on . Vascular surgery has only minor concerns for steal symptoms and given that the AVF is working well, they are okay with outpatient fistulograms/ venoplasty with wrist brachial index and PPG's, unless new concerns arise.  - Plan for outpatient workup as above; nephrology in agreement with outpatient workup.     Musculoskeletal:  # Right hip fracture s/p ORIF (2023)   - PT/OT     Skin:  # Left upper extremity unilateral edema, improving   # Bilateral pedal and ankle edema, improving  Edema due to third spacing due to hypoalbuminemia vs HF. BNP >09296 at admission, Echo on  shows EF of 55-60% with collapsible IVC. Extremity edema  to hypoalbuminemia. Upper limb duplex USG on  negative for DVT.  USG left arm on  shows steal physiology  and Vascular Surgery consulted (see Heme section). Overall edema in extremities have improved significantly with dialysis.    - Elevation and wrapping of only lower legs as needed and increased UF per nephrology for volume management; no wrapping of left upper extremity with dialysis fistula     General Cares/Prophylaxis:    DVT Prophylaxis: Apixaban  GI Prophylaxis: PPI  Restraints: N/A  Family Communication: Daughters Charity & Julia  Code Status: FULL     Lines/tubes/drains:  - PEG/J  - PICC line in RUE   - PIV x1  - Rectal tube     Disposition:  - Medical ICU      Patient seen and findings/plan discussed with medical ICU staff, Dr. Escobar.     Angelica Mejia, MS4    Resident/Fellow Attestation   I, Ting Aceves MD, was present with the medical/EMILY student who participated in the service and in the documentation of the note.  I have verified the history and personally performed the physical exam and medical decision making.  I agree with the assessment and plan of care as documented in the note.      Ting Aceves MD  PGY3  Date of Service (when I saw the patient): 03/26/24    Clinically Significant Risk Factors            # Hypomagnesemia: Lowest Mg = 1.6 mg/dL in last 2 days, will replace as needed   # Hypoalbuminemia: Lowest albumin = 2.6 g/dL at 2/18/2024  5:13 AM, will monitor as appropriate  # Coagulation Defect: INR = 1.42 (Ref range: 0.85 - 1.15) and/or PTT = N/A, will monitor for bleeding            # Severe Malnutrition: based on nutrition assessment      # Financial/Environmental Concerns: none            ====================================  INTERVAL HISTORY:   Yesterday pt tolerated HD with net 2L off. PST for 2h, up in chair. NAEON. This morning reports she feels the same, no significant changes. Asks to have rectal tube removed & wonders when she can eat.     OBJECTIVE:   1. VITAL SIGNS:   Temp:  [97.6  F (36.4  C)-99.4  F (37.4  C)] 97.6  F (36.4  C)  Pulse:  [58-78] 59  Resp:  [14-26] 16  BP:  ()/(45-65) 123/56  FiO2 (%):  [30 %-40 %] 30 %  SpO2:  [95 %-100 %] 100 %  Vent Mode: CMV/AC  (Continuous Mandatory Ventilation/ Assist Control)  FiO2 (%): 30 %  Resp Rate (Set): 16 breaths/min  Tidal Volume (Set, mL): 300 mL  PEEP (cm H2O): 5 cmH2O  Pressure Support (cm H2O): 10 cmH2O  Resp: 16  PIP 25    2. INTAKE/ OUTPUT:   I/O last 3 completed shifts:  In: 2253.85 [I.V.:1068.85; NG/GT:415]  Out: 2150 [Other:2000; Stool:150]  Net -2106 past 24h    3. PHYSICAL EXAMINATION:  General: awake, alert & oriented, resting in bed, communicating via writing  HEENT: Mucous membranes moist  Neuro: Awake and alert, no focal deficits, moving all extremities to command and spontaneously  Pulm/Resp: Coarse breath sounds bilaterally, diminished on R>L, no accessory muscle use  CV: RRR, S1/S2 without m/r/g; pedal pulses 2+ without LE edema  Abdomen: Soft, non-distended, non-tender  : rectal tube in place  Incisions/Skin: PICC and PEG site without surrounding erythema, no rashes noted    4. LABS:   Venous Blood Gas  Recent Labs   Lab 03/26/24  0313 03/25/24  1637 03/25/24  1005 03/25/24  0426   PHV 7.38 7.39 7.46* 7.49*   PCO2V 48 49 43 41   PO2V 40 39 39 34   HCO3V 28 30* 30* 31*   LINDY 2.2 4.0* 6.0* 7.0*   O2PER 30 30 30 40     Complete Blood Count   Recent Labs   Lab 03/26/24  0313 03/25/24  0426 03/24/24  0434 03/23/24  0427   WBC 5.2 5.8 7.8 9.9   HGB 8.2* 8.0* 9.1* 10.0*    179 169 191     Basic Metabolic Panel  Recent Labs   Lab 03/26/24  0415 03/26/24  0313 03/26/24  0026 03/25/24  2124 03/25/24  0823 03/25/24  0426 03/24/24  2348 03/24/24  2119 03/24/24  0835 03/24/24  0434   NA  --  138  --   --   --  138  --  138  --  134*   POTASSIUM  --  3.9  --   --   --  3.5  --  3.6  --  4.0   CHLORIDE  --  98  --   --   --  99  --  99  --  96*   CO2  --  26  --   --   --  27  --  28  --  27   BUN  --  51.1*  --   --   --  34.5*  --  25.2*  --  50.0*   CR  --  2.84*  --   --   --  2.00*  --  1.64*  --  2.93*   *  149* 144* 133*   < > 116*   < > 106*   < > 156*    < > = values in this interval not displayed.     Liver Function Tests  Recent Labs   Lab 03/26/24 0313 03/25/24  0426 03/24/24 0434 03/23/24 0427   AST 18 16 16 12   ALT 11 14 11 9   ALKPHOS 91 93 134 139   BILITOTAL 0.5 0.6 0.4 0.3   ALBUMIN 3.2* 3.1* 3.5 3.7   INR 1.42* 1.50* 1.22* 1.12     Coagulation Profile  Recent Labs   Lab 03/26/24 0313 03/25/24  0426 03/24/24  0434 03/23/24 0427   INR 1.42* 1.50* 1.22* 1.12     Micro:   - BAL 3/24:   - Cell count w diff: PMN 75%, lymphocytes 5, monocytes 19, eos 1  - No organisms seen on Gram stain  - RVP, HSV, Histoplasma neg  - Fungal & bacterial cultures NGTD  - EBV, CMV, PJP, Aspergillus, Legionella, Mycoplasma, AFB in process  - Bcx 3/24 NGTD    5. RADIOLOGY:   No results found for this or any previous visit (from the past 24 hour(s)).

## 2024-03-26 NOTE — PLAN OF CARE
ICU End of Shift Summary. See flowsheets for vital signs and detailed assessment.    Changes this shift: Alert and oriented. Communicates via writing on paper. Precedex at 0.3. Meeting MAP goal >65 with scheduled Midodrine. PST 12/5, 30% for majority of day. Rectal tube with 450 mL output. HD run today, 2.5L pulled. Daughter Julia updated via telephone.    Plan: Continue with POC. PT/OT, possibly HD again tomorrow.

## 2024-03-26 NOTE — PLAN OF CARE
Admitted/transferred from: 4E  Reason for admission/transfer: MICU patient   Patient status upon admission/transfer:   Interventions: Stable. Intubated. Alert and calm.  Plan:   2 RN skin assessment: completed by Sonia RN & Joy RN.   Sexual Orientation and Gender Identity (SOGI) smartfom completed: not done   Result of skin assessment and interventions/actions: Blanchable redness on coccyx, generalized bruises and scab.   Height, weight, drug calc weight: done   Patient belongings (see Flowsheet - Adult Profile for details): Remains with patient   MDRO education (if applicable): Done       ICU End of Shift Summary. See flowsheets for vital signs and detailed assessment.    Changes this shift: RASS 0 to -1, sedated on precedex. Alert and follows commands, able to make needs known. Denies pain. HR 60's, BP stable,, MAP>65.CMV settings 30%,PEEP 5.  Rectal tube with minimal output. PEG J with tubefeed at goal.     Plan:  Continue with plan of care and notify provider with changes

## 2024-03-26 NOTE — PROGRESS NOTES
Nephrology Progress Note  03/26/2024         Assessment & Recommendations:   Sofie Rodriguez is a 63 year old year old female with ESKD, COPD s/p b/l lung transplant in 6/2022 with multiple complications, gastroparesis s/p GJ tube, GIB, chronic diarrhea, recurrent c-diff, FTT with inability to tolerate any tube feeds, R hip fx s/p ORIF 12/2023, admitted on 2/10 for initiation of TPN/lipids, transferred to ICU on 2/17 with worsening mental status and respiratory acidosis in spite of bipap, ultimately intubated on 2/18, being treated for PNA, also with volume overload in setting of high volume TPN. Persistent respiratory acidosis in spite of volume off, transferred to ICU for hypotension and respiratory failure, improved on bipap, now floor status again 3/1. Transferred to ICU 3/18 again with worsening hypercapnic respiratory failure. Transferred to floor 3/20, back to ICU 3/24    # ESKD - TTS, LUE AVG, 3hr, 45.5 kg, [facility], Dr. Andres Fairbanks.  - had UF run yesterday, HD today, likely another extra UF run tomorrow   - Requires EMLA cream an hour before HD  - please avoid PICC which will compromise future dialysis accesses; a midline is much preferred for this patient    # Dialysis access: LUE AVG placed 3-4 months ago, per pt. She had arm swelling and a fistulogram a couple months ago and swelling improved but now has redeveloped.  - US with c/f possible steal syndrome  - per vascular surgery, no concern for steal syndrome, ok to go ahead with fistulogram  - given that AVF is working well on dialysis (450 BFR) and at this time IR is only able to perform fistulograms when AVF isn't working well, will defer to OP for management.     # Persistent respiratory acidosis:   - difficulty tolerating bipap due to claustrophobia, but wearing this lately  - balancing act between giving bicarb to maintain pH in setting of severe persistent respiratory acidosis with bicarb quickly converting CO2 and worsening overall  "status  - recommend stopping PO bicarb, ok to restart for pH < 7.1 but would stop again once pH is 7.2; will continue to provide bicarb with dialysis  - transplant pulm following  - re-intubated 3/24 for bronch/BAL (NGTD)    # Aflutter: on amio and BB  # Volume/BP: Anuric; on metoprolol soln 25 mg bid   - weights are variable, unsure of accuracy; appears close to euvolemia with much improvement in LE edema  - CXR 3/25 with likely pulm edema  - 2L UF yesterday, net neg only 106 ml  - UF goal 2.5L today     # Nutrition: on [employer] tube feeds     # Anemia 2/2 ESKD  On Venofer 50 qwk, Mircera last dose 1/9/2024  - hgb downdrifting, 8's  - Will continue Venofer 50 mcg q week (Tuesday)  - continue epogen 8000 units via HD    # BMD:   - Ca 8-9's, phos 5.3, alb 3.1  - sevelamer on hold         Recommendations were communicated to primary team via note     RABIA Moctezuma   Division of Renal Disease and Hypertension  [phone]    Interval History :   Seen on dialysis, 2L off yesterday but net neg only 100 ml. Goal 2.5L UF today. Plan another UF run tomorrow. CXR with pulm edema. Intubated, on pressure support trial. Nodding yes/no to questions. No n/v, CP, chills. Anxious to be extubated    Review of Systems:   4 point ROS neg other than as noted above    Physical Exam:   I/O last 3 completed shifts:  In: 2253.85 [I.V.:1068.85; NG/GT:415]  Out: 2150 [Other:2000; Stool:150]   /55   Pulse 58   Temp 97.7  F (36.5  C) (Axillary)   Resp 21   Ht 1.57 m (5' 1.81\")   Wt 51.7 kg (113 lb 15.7 oz)   SpO2 100%   BMI 20.97 kg/m       GENERAL APPEARANCE: on vent  PULM: diminished  CV: RRR    trace no peripheral  GI: soft   INTEGUMENT: no cyanosis, no rashes on exposed skin  NEURO: a/o3  Access Left AVG     Labs:   All labs reviewed by me  Electrolytes/Renal -   Recent Labs   Lab Test 03/26/24  1240 03/26/24  0850 03/26/24  0415 03/26/24  0313 03/25/24  0823 03/25/24  0426 03/24/24  2348 03/24/24  2119 03/24/24  0835 " 03/24/24  0434   NA  --   --   --  138  --  138  --  138  --  134*   POTASSIUM  --   --   --  3.9  --  3.5  --  3.6  --  4.0   CHLORIDE  --   --   --  98  --  99  --  99  --  96*   CO2  --   --   --  26  --  27  --  28  --  27   BUN  --   --   --  51.1*  --  34.5*  --  25.2*  --  50.0*   CR  --   --   --  2.84*  --  2.00*  --  1.64*  --  2.93*   * 132* 136* 149*   < > 116*   < > 106*   < > 156*   ESTUARDO  --   --   --  9.2  --  8.5*  --  8.5*  --  8.9   MAG  --   --   --  2.2  --  1.6*  --   --   --  1.8   PHOS  --   --   --  5.3*  --  2.2*  --   --   --  4.9*    < > = values in this interval not displayed.       CBC -   Recent Labs   Lab Test 03/26/24 0313 03/25/24 0426 03/24/24  0434   WBC 5.2 5.8 7.8   HGB 8.2* 8.0* 9.1*    179 169       LFTs -   Recent Labs   Lab Test 03/26/24 0313 03/25/24 0426 03/24/24  0434   ALKPHOS 91 93 134   BILITOTAL 0.5 0.6 0.4   ALT 11 14 11   AST 18 16 16   PROTTOTAL 5.2* 4.9* 5.5*   ALBUMIN 3.2* 3.1* 3.5       Iron Panel -   Recent Labs   Lab Test 09/26/22  0555 09/03/22  1039 08/24/22  0810   IRON 54 21* 41   IRONSAT 22 9* 21   CARLOS 769* 343* 334*         Imaging:  All imaging studies reviewed by me.     Current Medications:   acetylcysteine  2 mL Nebulization 4x daily    amiodarone  400 mg Oral BID    apixaban ANTICOAGULANT  2.5 mg Oral BID    calcium carbonate-vitamin D  1 tablet Per J Tube TID w/meals    chlorhexidine  15 mL Mouth/Throat Q12H    [Held by provider] cyanocobalamin  500 mcg Per Feeding Tube Daily    cycloSPORINE modified  175 mg Per G Tube QPM    cycloSPORINE modified  200 mg Per Feeding Tube QAM    heparin lock flush  5-20 mL Intracatheter Q24H    insulin aspart  1-4 Units Subcutaneous Q4H    levalbuterol  1.25 mg Nebulization 4x Daily    levothyroxine  25 mcg Oral QAM AC    lidocaine  1 patch Transdermal Q24h    liothyronine  2.5 mcg Oral BID    metoprolol tartrate  25 mg Per J Tube BID    midodrine  10 mg Oral 3 times per day on Tuesday Thursday  Saturday    OLANZapine zydis  5 mg Oral At Bedtime    pantoprazole  40 mg Per J Tube BID    piperacillin-tazobactam  2.25 g Intravenous Q6H    predniSONE  5 mg Per J Tube QAM    And    predniSONE  2.5 mg Per J Tube QPM    [Held by provider] sevelamer carbonate (RENVELA)  0.8 g Oral BID    [Held by provider] sodium bicarbonate  1,300 mg Oral or Feeding Tube TID    sodium chloride (PF)  10 mL Intracatheter Q8H    sodium chloride (PF)  10-40 mL Intracatheter Q8H    sulfamethoxazole-trimethoprim  1 tablet Oral Q Mon Wed Fri AM      dexmedeTOMIDine 0.3 mcg/kg/hr (03/26/24 1000)    dextrose      fentaNYL Stopped (03/25/24 1306)    - MEDICATION INSTRUCTIONS -      norepinephrine Stopped (03/25/24 0200)    - MEDICATION INSTRUCTIONS -      - MEDICATION INSTRUCTIONS -      sodium chloride 0.9%      - MEDICATION INSTRUCTIONS -       RABIA Moctezuma

## 2024-03-26 NOTE — PROGRESS NOTES
Care Management Follow Up    Length of Stay (days): 45    Expected Discharge Date:       Concerns to be Addressed: discharge planning, other (see comments) (New TPN)     Patient plan of care discussed at interdisciplinary rounds: Yes  Additional Information:   Per discussion with multi-disciplinary team patient is still vented. She had PS trials. She has bradycardia.   MICU 2 attending has asked to schedulde Care Conference for 3/28/24 for 12-1 pm start time for MICU2, Pulm transplant  and Pulm transplant MSW and family.   Requested ERIK Edmondson to schedule the Care Conference.       Addendum 1442: Notified  by MICU team that per Pulm Transplant team care conference will be held next week. Requested ERIK Edmondson to cancel arrangements.     Angela Johnson, MSN, MA, CCM, RN  4C/4A/4E ICU Care Coordinator  Phone:[phone]  Pager: [phone]    For Weekend & Holiday on call RN Care Coordinator:  [facility] ([phone]) Saturday & Sunday; [phone]) [facility] Recognized Holidays   Weekend 4C/4A/4E ICUs /6A Care Coordinator  Pager: [phone]

## 2024-03-27 ENCOUNTER — APPOINTMENT (OUTPATIENT)
Dept: PHYSICAL THERAPY | Facility: CLINIC | Age: 64
DRG: 640 | End: 2024-03-27
Attending: INTERNAL MEDICINE
Payer: MEDICARE

## 2024-03-27 LAB
ALBUMIN SERPL BCG-MCNC: 3.1 G/DL (ref 3.5–5.2)
ALP SERPL-CCNC: 93 U/L (ref 40–150)
ALT SERPL W P-5'-P-CCNC: 13 U/L (ref 0–50)
ANION GAP SERPL CALCULATED.3IONS-SCNC: 10 MMOL/L (ref 7–15)
AST SERPL W P-5'-P-CCNC: 17 U/L (ref 0–45)
BASE EXCESS BLDV CALC-SCNC: 4.7 MMOL/L (ref -3–3)
BASE EXCESS BLDV CALC-SCNC: 5 MMOL/L (ref -3–3)
BASE EXCESS BLDV CALC-SCNC: 6.9 MMOL/L (ref -3–3)
BASOPHILS # BLD AUTO: ABNORMAL 10*3/UL
BASOPHILS # BLD MANUAL: 0 10E3/UL (ref 0–0.2)
BASOPHILS NFR BLD AUTO: ABNORMAL %
BASOPHILS NFR BLD MANUAL: 0 %
BILIRUB SERPL-MCNC: 0.4 MG/DL
BUN SERPL-MCNC: 29.2 MG/DL (ref 8–23)
CALCIUM SERPL-MCNC: 8.8 MG/DL (ref 8.8–10.2)
CHLORIDE SERPL-SCNC: 99 MMOL/L (ref 98–107)
CREAT SERPL-MCNC: 1.97 MG/DL (ref 0.51–0.95)
CYCLOSPORINE BLD LC/MS/MS-MCNC: 189 UG/L (ref 50–400)
DEPRECATED HCO3 PLAS-SCNC: 29 MMOL/L (ref 22–29)
EGFRCR SERPLBLD CKD-EPI 2021: 28 ML/MIN/1.73M2
EOSINOPHIL # BLD AUTO: ABNORMAL 10*3/UL
EOSINOPHIL # BLD MANUAL: 0.7 10E3/UL (ref 0–0.7)
EOSINOPHIL NFR BLD AUTO: ABNORMAL %
EOSINOPHIL NFR BLD MANUAL: 12 %
ERYTHROCYTE [DISTWIDTH] IN BLOOD BY AUTOMATED COUNT: 17.2 % (ref 10–15)
GLUCOSE BLDC GLUCOMTR-MCNC: 112 MG/DL (ref 70–99)
GLUCOSE BLDC GLUCOMTR-MCNC: 113 MG/DL (ref 70–99)
GLUCOSE BLDC GLUCOMTR-MCNC: 116 MG/DL (ref 70–99)
GLUCOSE BLDC GLUCOMTR-MCNC: 89 MG/DL (ref 70–99)
GLUCOSE BLDC GLUCOMTR-MCNC: 94 MG/DL (ref 70–99)
GLUCOSE SERPL-MCNC: 120 MG/DL (ref 70–99)
HCO3 BLDV-SCNC: 31 MMOL/L (ref 21–28)
HCO3 BLDV-SCNC: 31 MMOL/L (ref 21–28)
HCO3 BLDV-SCNC: 32 MMOL/L (ref 21–28)
HCT VFR BLD AUTO: 29.6 % (ref 35–47)
HGB BLD-MCNC: 8.9 G/DL (ref 11.7–15.7)
IMM GRANULOCYTES # BLD: ABNORMAL 10*3/UL
IMM GRANULOCYTES NFR BLD: ABNORMAL %
INR PPP: 1.24 (ref 0.85–1.15)
L PNEUMO DNA SPEC QL NAA+PROBE: NOT DETECTED
LEGIONELLA DNA SPEC NAA+PROBE: NOT DETECTED
LYMPHOCYTES # BLD AUTO: ABNORMAL 10*3/UL
LYMPHOCYTES # BLD MANUAL: 0.7 10E3/UL (ref 0.8–5.3)
LYMPHOCYTES NFR BLD AUTO: ABNORMAL %
LYMPHOCYTES NFR BLD MANUAL: 12 %
M PNEUMO DNA SPEC QL NAA+PROBE: NOT DETECTED
MAGNESIUM SERPL-MCNC: 1.8 MG/DL (ref 1.7–2.3)
MCH RBC QN AUTO: 34.2 PG (ref 26.5–33)
MCHC RBC AUTO-ENTMCNC: 30.1 G/DL (ref 31.5–36.5)
MCV RBC AUTO: 114 FL (ref 78–100)
MONOCYTES # BLD AUTO: ABNORMAL 10*3/UL
MONOCYTES # BLD MANUAL: 0.6 10E3/UL (ref 0–1.3)
MONOCYTES NFR BLD AUTO: ABNORMAL %
MONOCYTES NFR BLD MANUAL: 10 %
NEUTROPHILS # BLD AUTO: ABNORMAL 10*3/UL
NEUTROPHILS # BLD MANUAL: 3.6 10E3/UL (ref 1.6–8.3)
NEUTROPHILS NFR BLD AUTO: ABNORMAL %
NEUTROPHILS NFR BLD MANUAL: 66 %
NIACIN SERPL-MCNC: NORMAL NG/ML
NICOTINAMIDE SERPL-MCNC: 37 NG/ML
NICOTINURATE SERPL-MCNC: NORMAL NG/ML
NRBC # BLD AUTO: 0 10E3/UL
NRBC BLD AUTO-RTO: 0 /100
O2/TOTAL GAS SETTING VFR VENT: 30 %
OXYHGB MFR BLDV: 66 % (ref 70–75)
OXYHGB MFR BLDV: 69 % (ref 70–75)
OXYHGB MFR BLDV: 73 % (ref 70–75)
PCO2 BLDV: 48 MM HG (ref 40–50)
PCO2 BLDV: 52 MM HG (ref 40–50)
PCO2 BLDV: 54 MM HG (ref 40–50)
PH BLDV: 7.37 [PH] (ref 7.32–7.43)
PH BLDV: 7.38 [PH] (ref 7.32–7.43)
PH BLDV: 7.44 [PH] (ref 7.32–7.43)
PHOSPHATE SERPL-MCNC: 3.9 MG/DL (ref 2.5–4.5)
PLAT MORPH BLD: ABNORMAL
PLATELET # BLD AUTO: 191 10E3/UL (ref 150–450)
PO2 BLDV: 37 MM HG (ref 25–47)
PO2 BLDV: 40 MM HG (ref 25–47)
PO2 BLDV: 42 MM HG (ref 25–47)
POLYCHROMASIA BLD QL SMEAR: SLIGHT
POTASSIUM SERPL-SCNC: 3.7 MMOL/L (ref 3.4–5.3)
PROT SERPL-MCNC: 5.2 G/DL (ref 6.4–8.3)
RBC # BLD AUTO: 2.6 10E6/UL (ref 3.8–5.2)
RBC MORPH BLD: ABNORMAL
SAO2 % BLDV: 67.9 % (ref 70–75)
SAO2 % BLDV: 70.8 % (ref 70–75)
SAO2 % BLDV: 73.8 % (ref 70–75)
SODIUM SERPL-SCNC: 138 MMOL/L (ref 135–145)
TME LAST DOSE: NORMAL H
TME LAST DOSE: NORMAL H
WBC # BLD AUTO: 5.5 10E3/UL (ref 4–11)

## 2024-03-27 PROCEDURE — 85610 PROTHROMBIN TIME: CPT | Performed by: STUDENT IN AN ORGANIZED HEALTH CARE EDUCATION/TRAINING PROGRAM

## 2024-03-27 PROCEDURE — 999N000157 HC STATISTIC RCP TIME EA 10 MIN

## 2024-03-27 PROCEDURE — 84302 ASSAY OF SWEAT SODIUM: CPT

## 2024-03-27 PROCEDURE — 250N000009 HC RX 250

## 2024-03-27 PROCEDURE — 84133 ASSAY OF URINE POTASSIUM: CPT

## 2024-03-27 PROCEDURE — 250N000013 HC RX MED GY IP 250 OP 250 PS 637

## 2024-03-27 PROCEDURE — 99207 PR NO BILLABLE SERVICE THIS VISIT: CPT

## 2024-03-27 PROCEDURE — 83735 ASSAY OF MAGNESIUM: CPT | Performed by: STUDENT IN AN ORGANIZED HEALTH CARE EDUCATION/TRAINING PROGRAM

## 2024-03-27 PROCEDURE — 82805 BLOOD GASES W/O2 SATURATION: CPT

## 2024-03-27 PROCEDURE — 250N000011 HC RX IP 250 OP 636

## 2024-03-27 PROCEDURE — 99233 SBSQ HOSP IP/OBS HIGH 50: CPT | Performed by: INTERNAL MEDICINE

## 2024-03-27 PROCEDURE — 97110 THERAPEUTIC EXERCISES: CPT | Mod: GP

## 2024-03-27 PROCEDURE — 99291 CRITICAL CARE FIRST HOUR: CPT | Mod: FS

## 2024-03-27 PROCEDURE — 80158 DRUG ASSAY CYCLOSPORINE: CPT | Performed by: NURSE PRACTITIONER

## 2024-03-27 PROCEDURE — 94799 UNLISTED PULMONARY SVC/PX: CPT

## 2024-03-27 PROCEDURE — 250N000013 HC RX MED GY IP 250 OP 250 PS 637: Performed by: INTERNAL MEDICINE

## 2024-03-27 PROCEDURE — 258N000003 HC RX IP 258 OP 636: Performed by: INTERNAL MEDICINE

## 2024-03-27 PROCEDURE — 94003 VENT MGMT INPAT SUBQ DAY: CPT

## 2024-03-27 PROCEDURE — 85007 BL SMEAR W/DIFF WBC COUNT: CPT

## 2024-03-27 PROCEDURE — 94640 AIRWAY INHALATION TREATMENT: CPT | Mod: 76

## 2024-03-27 PROCEDURE — 99233 SBSQ HOSP IP/OBS HIGH 50: CPT | Performed by: PHYSICIAN ASSISTANT

## 2024-03-27 PROCEDURE — 80053 COMPREHEN METABOLIC PANEL: CPT

## 2024-03-27 PROCEDURE — 250N000012 HC RX MED GY IP 250 OP 636 PS 637: Performed by: NURSE PRACTITIONER

## 2024-03-27 PROCEDURE — 250N000012 HC RX MED GY IP 250 OP 636 PS 637

## 2024-03-27 PROCEDURE — 85027 COMPLETE CBC AUTOMATED: CPT

## 2024-03-27 PROCEDURE — 200N000002 HC R&B ICU UMMC

## 2024-03-27 PROCEDURE — 84100 ASSAY OF PHOSPHORUS: CPT | Performed by: STUDENT IN AN ORGANIZED HEALTH CARE EDUCATION/TRAINING PROGRAM

## 2024-03-27 PROCEDURE — 94640 AIRWAY INHALATION TREATMENT: CPT

## 2024-03-27 PROCEDURE — 250N000013 HC RX MED GY IP 250 OP 250 PS 637: Performed by: STUDENT IN AN ORGANIZED HEALTH CARE EDUCATION/TRAINING PROGRAM

## 2024-03-27 PROCEDURE — 90937 HEMODIALYSIS REPEATED EVAL: CPT

## 2024-03-27 RX ORDER — CYCLOSPORINE 100 MG/ML
175 SOLUTION ORAL EVERY MORNING
Status: DISCONTINUED | OUTPATIENT
Start: 2024-03-28 | End: 2024-03-30

## 2024-03-27 RX ORDER — OXYMETAZOLINE HYDROCHLORIDE 0.05 G/100ML
2 SPRAY NASAL 2 TIMES DAILY
Status: DISCONTINUED | OUTPATIENT
Start: 2024-03-27 | End: 2024-04-01

## 2024-03-27 RX ADMIN — CYCLOSPORINE 175 MG: 100 SOLUTION ORAL at 17:41

## 2024-03-27 RX ADMIN — APIXABAN 2.5 MG: 2.5 TABLET, FILM COATED ORAL at 08:07

## 2024-03-27 RX ADMIN — LORAZEPAM 0.25 MG: 2 INJECTION INTRAMUSCULAR; INTRAVENOUS at 22:13

## 2024-03-27 RX ADMIN — Medication 2.5 MCG: at 20:42

## 2024-03-27 RX ADMIN — METOPROLOL TARTRATE 25 MG: 25 TABLET, FILM COATED ORAL at 20:40

## 2024-03-27 RX ADMIN — CHLORHEXIDINE GLUCONATE 0.12% ORAL RINSE 15 ML: 1.2 LIQUID ORAL at 08:06

## 2024-03-27 RX ADMIN — PIPERACILLIN AND TAZOBACTAM 2.25 G: 2; .25 INJECTION, POWDER, FOR SOLUTION INTRAVENOUS at 08:08

## 2024-03-27 RX ADMIN — AMIODARONE HYDROCHLORIDE 400 MG: 200 TABLET ORAL at 08:07

## 2024-03-27 RX ADMIN — Medication 40 MG: at 20:39

## 2024-03-27 RX ADMIN — PIPERACILLIN AND TAZOBACTAM 2.25 G: 2; .25 INJECTION, POWDER, FOR SOLUTION INTRAVENOUS at 20:40

## 2024-03-27 RX ADMIN — APIXABAN 2.5 MG: 2.5 TABLET, FILM COATED ORAL at 20:40

## 2024-03-27 RX ADMIN — PIPERACILLIN AND TAZOBACTAM 2.25 G: 2; .25 INJECTION, POWDER, FOR SOLUTION INTRAVENOUS at 15:54

## 2024-03-27 RX ADMIN — PIPERACILLIN AND TAZOBACTAM 2.25 G: 2; .25 INJECTION, POWDER, FOR SOLUTION INTRAVENOUS at 02:06

## 2024-03-27 RX ADMIN — LEVALBUTEROL HYDROCHLORIDE 1.25 MG: 1.25 SOLUTION RESPIRATORY (INHALATION) at 20:14

## 2024-03-27 RX ADMIN — Medication 40 MG: at 08:08

## 2024-03-27 RX ADMIN — LEVALBUTEROL HYDROCHLORIDE 1.25 MG: 1.25 SOLUTION RESPIRATORY (INHALATION) at 07:52

## 2024-03-27 RX ADMIN — HYDROXYZINE HYDROCHLORIDE 25 MG: 25 TABLET, FILM COATED ORAL at 18:43

## 2024-03-27 RX ADMIN — MIDODRINE HYDROCHLORIDE 10 MG: 5 TABLET ORAL at 10:59

## 2024-03-27 RX ADMIN — LIDOCAINE: 40 CREAM TOPICAL at 10:00

## 2024-03-27 RX ADMIN — AMIODARONE HYDROCHLORIDE 400 MG: 200 TABLET ORAL at 20:40

## 2024-03-27 RX ADMIN — CHLORHEXIDINE GLUCONATE 0.12% ORAL RINSE 15 ML: 1.2 LIQUID ORAL at 20:39

## 2024-03-27 RX ADMIN — ACETYLCYSTEINE 2 ML: 100 SOLUTION ORAL; RESPIRATORY (INHALATION) at 13:19

## 2024-03-27 RX ADMIN — SODIUM CHLORIDE 300 ML: 9 INJECTION, SOLUTION INTRAVENOUS at 11:23

## 2024-03-27 RX ADMIN — SULFAMETHOXAZOLE AND TRIMETHOPRIM 1 TABLET: 400; 80 TABLET ORAL at 20:40

## 2024-03-27 RX ADMIN — CYCLOSPORINE 200 MG: 100 SOLUTION ORAL at 08:10

## 2024-03-27 RX ADMIN — ACETYLCYSTEINE 2 ML: 100 SOLUTION ORAL; RESPIRATORY (INHALATION) at 07:53

## 2024-03-27 RX ADMIN — CALCIUM CARBONATE 600 MG (1,500 MG)-VITAMIN D3 400 UNIT TABLET 1 TABLET: at 12:39

## 2024-03-27 RX ADMIN — OXYMETAZOLINE HYDROCHLORIDE 2 SPRAY: 0.05 SPRAY NASAL at 20:38

## 2024-03-27 RX ADMIN — LEVALBUTEROL HYDROCHLORIDE 1.25 MG: 1.25 SOLUTION RESPIRATORY (INHALATION) at 15:43

## 2024-03-27 RX ADMIN — CALCIUM CARBONATE 600 MG (1,500 MG)-VITAMIN D3 400 UNIT TABLET 1 TABLET: at 17:41

## 2024-03-27 RX ADMIN — LEVOTHYROXINE SODIUM 25 MCG: 0.03 TABLET ORAL at 06:05

## 2024-03-27 RX ADMIN — ACETYLCYSTEINE 2 ML: 100 SOLUTION ORAL; RESPIRATORY (INHALATION) at 20:14

## 2024-03-27 RX ADMIN — PREDNISONE 5 MG: 5 TABLET ORAL at 08:07

## 2024-03-27 RX ADMIN — LEVALBUTEROL HYDROCHLORIDE 1.25 MG: 1.25 SOLUTION RESPIRATORY (INHALATION) at 13:19

## 2024-03-27 RX ADMIN — SODIUM CHLORIDE 250 ML: 9 INJECTION, SOLUTION INTRAVENOUS at 11:22

## 2024-03-27 RX ADMIN — OLANZAPINE 5 MG: 5 TABLET, ORALLY DISINTEGRATING ORAL at 22:13

## 2024-03-27 RX ADMIN — ACETYLCYSTEINE 2 ML: 100 SOLUTION ORAL; RESPIRATORY (INHALATION) at 15:43

## 2024-03-27 RX ADMIN — PREDNISONE 2.5 MG: 2.5 TABLET ORAL at 20:40

## 2024-03-27 RX ADMIN — CALCIUM CARBONATE 600 MG (1,500 MG)-VITAMIN D3 400 UNIT TABLET 1 TABLET: at 08:07

## 2024-03-27 RX ADMIN — Medication 2.5 MCG: at 08:08

## 2024-03-27 ASSESSMENT — ACTIVITIES OF DAILY LIVING (ADL)
ADLS_ACUITY_SCORE: 42
ADLS_ACUITY_SCORE: 42
ADLS_ACUITY_SCORE: 43
ADLS_ACUITY_SCORE: 42
ADLS_ACUITY_SCORE: 43
ADLS_ACUITY_SCORE: 42
ADLS_ACUITY_SCORE: 43
ADLS_ACUITY_SCORE: 43
ADLS_ACUITY_SCORE: 42
ADLS_ACUITY_SCORE: 42
ADLS_ACUITY_SCORE: 43
ADLS_ACUITY_SCORE: 42
ADLS_ACUITY_SCORE: 43
ADLS_ACUITY_SCORE: 42
ADLS_ACUITY_SCORE: 42
ADLS_ACUITY_SCORE: 43
ADLS_ACUITY_SCORE: 42

## 2024-03-27 NOTE — PLAN OF CARE
Major Shift Events:  RASS 0 to -1, sedated on precedex, following commands, able to make needs known, CARMONA. SB/SR, rates 50s-60. MAP goal >65 maintained. CMV settings overnight 30%/16/300/5. Rectal tube in place with moderate output. TF @ goal via J tube, G tube clamped. Anuric on HD.     Plan: Possible HD today. Continue with plan of care.    For vital signs and complete assessments, please see documentation flowsheets.

## 2024-03-27 NOTE — PROGRESS NOTES
Lung Transplant Consult Follow Up Note   March 27, 2024            Assessment and Plan:   Sofie Rodriguez is a 63 year old female with h/o COPD s/p BSLT (2022) with course complicated by post-operative hemidiaphragm palsy, recurrent PNAs, positive DSA, EBV viremia, hypogammaglobulinemia, severe gastroparesis s/p G/J tube placement (7/27/22) and pyloric botox (1/25/23), GI bleed 2/2 pyloric ulcer, hemobilia s/p ERCP and MRCP, chronic diarrhea, recurrent C diff colitis, ESRD on iHD, recurrent falls with injuries (recent right hip fx s/p ORIF 12/2023), deconditioning, and FTT.  Admitted 2/10 for failure to thrive and initiation of TPN/lipids.  Emergent intubation 2/17-2/19 for hypoxic and hypercapneic respiratory failure and encephalopathy. Returned to ICU 2/28-2/29 and again 3/18-3/20 d/t tenuous respiratory status complicated by variable daytime NIPPV tolerance and hypervolemia with iHD limited d/t hypotension. Again transferred back to ICU 3/24 for intubation and bronch/BAL given hypoxic and hypercapneic respiratory failure. CT with extensive bilateral infiltrate and etiology likely multifactorial including volume overload and infection, possible mucous plugging, ACR or AMR less likely.  Oxygenating well, ventilating easily, gradual improvement in radiographic changes.      Today's recommendations:  -Cyclosporine level is supratherapeutic, reduce to 175 mg twice daily and recheck level on Saturday 3/30 (ordered)  - Ventilator management and pressure support trials per MICU  - Follow pending BAL and Blood cultures  - ABX: Continue Zosyn for 7 day empiric course if cultures remain NGTD  - Continue aggressive airway clearance with Volera (intrapulmonary percussion) QID and nebs: Xopenex and Mucomyst QID  - Repeat DSA 3/23 pending  - Volume removal and dialysis per nephrology: now tolerating volume removal with scheduled midodrine on HD days  - Tentative plan for care conference for medical update and long term planning  per primary sometime this week     Acute on chronic hypoxic/hypercapneic respiratory failure:  S/p bilateral sequential lung transplant for COPD:   Hypoventilation, Suspected CARLEE: CT (2/7) with decreased MARLON opacities but new tree in bud RLL opacities.  PFTs in clinic remained significantly below her baseline.  Baseline hypoxia with 2L NC overnight PTA.  S/p intubation 2/17-2/19 for respiratory decompensation with encephalopathy and severe respiratory acidosis, CT with concern for infection and pulmonary edema given recent addition of TPN nutrition.  Bronch (2/18) with very friable tissue, cutlures only with C. glabrata.  Persistent respiratory failure also complicated by hypoventilation and deconditioning d/t malnutrition.  Returned to ICU 2/28-2/29 d/t tenuous respiratory status complicated by variable daytime NIPPV tolerance.  Neurology consulted 2/27, MRI brain (3/1) without acute intracranial pathology.  SNIFF (3/8) normal.  Metabolic CART suggested overfeeding on TPN.  NIF and FVC continue to downtrend (3/5-3/15) prompting concern for potential neuromuscular cause.  Continues with refractory severe hypercapneic respiratory failure and recurrent intolerance of extended time off NIPPV.  Returned to ICU again 3/18-3/20 d/t tenuous respiratory status complicated by NIPPV tolerance and hypervolemia with iHD limited d/t hypotension (CXR with increased pulmonary edema).  Overall, her PCO2 retention is likely multifactorial with a component being from overfeeding (though remedied with nutrition adjustment), metabolic compensation barrier d/t renal failure, pulmonary edema, bicarb loss with diarrhea, hypoventilation with declining FVC concerning for neuromuscular etiology (though Neurology consult was not revealing), and NIPPV intolerance due to claustrophobia. Worsening hypoxic and hypercapneic respiratory failure 3/24 and transferred to ICU for intubation. CT chest with extensive bilateral infiltrates markedly  increased from previous. Bronch with small L>R sided secretions easily suctioned, BAL with no evidence of DAH, non bloody. Etiology likely multifactorial including volume overload and infection, ACR or AMR less likely (as below). Hypoxia improving, tolerating pressure support trials at 12/5 30%.  - 3/27 VBG 7.44/48  - Ventilator management and pressure support trials per MICU  - 3/24 BAL Cx NGTD  - 3/24 BAL  Legionella (-)  - 3/24 BAL Fungal Cx NGTD  - 3/24 BAL Galactomannan (-)  - ABX: Continue Zosyn (3/23) for 7 day empiric course if cultures remain NGTD.  Vancomycin 3/23-3/25 (MRSA swab negative); s/p micafungin 100 mg x1 3/23  - Blood culture (3/24) NGTD  - Continue aggressive airway clearance with Volera (intrapulmonary percussion) QID and nebs: Xopenex and Mucomyst QID  - Nasal pillow with BiPAP after discharge to help with tolerance to overnight BiPAP (unable to use with hospital equipment), RN TXPT CC assisting with obtaining BiPAP machine for home use and deliver to hospital for patient use to ensure tolerance with nasal pillow prior to discharge     Immunosuppression:  AZA previously stopped 5/2023 d/t low ImmuKnow assay and EBV viremia.  ImmuKnow (2/27) remained low at 88.  Transitioned from [place] to CSA 3/11.  - CSA goal 140-170.   -Cyclosporine level is supratherapeutic (3/27), reduce to 175 mg twice daily and recheck level on Saturday 3/30 (ordered)  - Prednisone 5 mg qAM / 2.5 mg qPM     Prophylaxis:   - Bactrim qMWF for PJP ppx (3/13, prior dapsone through 3/11)  - CMV ppx not currently indicated, D+/R+, CMV negative 3/18     Positive DSA: AMR treatment deferred given frailty and stable PFTs.  DSA DQB2 stable to decreased on 3/6 with 5667 mfi.  Most recent cell-free DNA (2/18) mildly elevated at 1.04 (concerning for possible rejection), which was increased from prior level of 0.12 (1/10).  IST increase deferred at that time per Dr. Gong.  S/p IVIG (2/27) for DSA (IgG WNL). Immuknow was 108 (1/10)  and 88 on 2/27/24.  - Repeat DSA 3/23 pending  - Prospera (Cell Free DNA) 0.44 (3/18) suggests AMR less likely, follow      EBV viremia: CT CAP (2/7) without lymphadenopathy.  Recent levels improving (3/18) 23k.  - Repeat EBV in one month (~4/18)     Other relevant problems being managed by the primary team:      FTT:  Severe protein calorie malnutrition:  Gastroparesis s/p PEG/J, botox, and G-POEM:  SB hypomotility:  Pyloric ulcer:  Chronic nausea and osmotic diarrhea:  SIBO s/p rifaximin:   Recurrent C diff colitis: Chronic osmotic and infectious diarrhea since transplant with recurrent episodes of C diff.  Notable weight loss (40# in a year) d/t diarrhea, GI dysmotility, and intolerance of enteral feeds (PEG/J tube in place), most recently on elemental formula.  Extensive OP eval and f/u with GI. S/p port placement for TPN and lipids. Continued for ~5 weeks inpatient without considerable improvement. TPN also not good long term option for home and attempting to resume tube feeds.   - Continuing tube feeds, tolerating thus far  - Persistent acidosis as above, metabolic cart showed likely overfeeding on TPN, will consider repeating now that back on tube feeds  - C diff positive (3/13), on fidaxomicin.     ESRD: CT with volume overload secondary to TPN volume, iHD increased from PTA TThSa schedule with unsustained improvement.  Management per Nephrology, dialysis via [facility] CVC.  Unable to remove fluid on 3/23 d/t hypotension.  - Volume removal and dialysis per nephrology: T/T/S schedule with extra runs 3/24 and 3/25, now tolerating volume removal with scheduled midodrine on HD days     Goals of care: Care conference held with palliative and primary team on 3/15 for medical update with pt. and daughters.  Comfort cares discussed but pt. declining at this time.  Palliative following (our team discussed at length with palliative provider 3/19).    - Tentative plan for care conference for medical update and long term  planning in coming days (?end of week)     We appreciate the excellent care provided by the MICU team.  Recommendations communicated via this note.  Will continue to follow along closely, please do not hesitate to call with any questions or concerns.     Ajay Castillo MD  [phone]          Interval History:   No specific complaints. PS 12/5 with -350.  With decrease of PS to 7, TV decreased to 160  Dyspnea at rest: None on PS 12/5  Cough:  infrequent   Hemoptysis: none   Chest Pain: None           Review of Systems:   C: NEGATIVE for fever, chills  INTEGUMENTARY/SKIN: no rash or obvious new lesions  ENT/MOUTH: no sore throat, new sinus pain or nasal drainage  RESP: see interval history  CV: NEGATIVE for chest pain or peripheral edema  GI: no nausea, vomiting.  Persistent loose stool  : dialysis  MUSCULOSKELETAL: no myalgias, arthralgias            Medications:      acetylcysteine  2 mL Nebulization 4x daily    amiodarone  400 mg Oral BID    apixaban ANTICOAGULANT  2.5 mg Oral BID    calcium carbonate-vitamin D  1 tablet Per J Tube TID w/meals    chlorhexidine  15 mL Mouth/Throat Q12H    [Held by provider] cyanocobalamin  500 mcg Per Feeding Tube Daily    cycloSPORINE modified  175 mg Per G Tube QPM    cycloSPORINE modified  200 mg Per G Tube QAM    heparin lock flush  5-20 mL Intracatheter Q24H    insulin aspart  1-4 Units Subcutaneous Q4H    levalbuterol  1.25 mg Nebulization 4x Daily    levothyroxine  25 mcg Oral QAM AC    lidocaine  1 patch Transdermal Q24h    liothyronine  2.5 mcg Oral BID    metoprolol tartrate  25 mg Per J Tube BID    midodrine  10 mg Oral 3 times per day on Tuesday Thursday Saturday    - MEDICATION INSTRUCTIONS -   Does not apply Once    OLANZapine zydis  5 mg Oral At Bedtime    pantoprazole  40 mg Per J Tube BID    piperacillin-tazobactam  2.25 g Intravenous Q6H    predniSONE  5 mg Per J Tube QAM    And    predniSONE  2.5 mg Per J Tube QPM    [Held by provider] sevelamer carbonate  (RENVELA)  0.8 g Oral BID    [Held by provider] sodium bicarbonate  1,300 mg Oral or Feeding Tube TID    sodium chloride (PF)  10 mL Intracatheter Q8H    sodium chloride (PF)  10-40 mL Intracatheter Q8H    sodium chloride 0.9%  250 mL Intravenous Once in dialysis/CRRT    sodium chloride 0.9%  300 mL Hemodialysis Machine Once    sulfamethoxazole-trimethoprim  1 tablet Oral Q Mon Wed Fri AM     acetaminophen, albumin human, albuterol, calcium carbonate, artificial tears, dextrose, glucose **OR** dextrose **OR** glucagon, diphenhydrAMINE **OR** diphenhydrAMINE, fentaNYL, heparin lock flush, hydrOXYzine HCl, lidocaine 4%, lidocaine (buffered or not buffered), lidocaine (buffered or not buffered), lidocaine (buffered or not buffered), lidocaine (buffered or not buffered), lidocaine (buffered or not buffered), lidocaine-prilocaine, LORazepam, naloxone **OR** naloxone **OR** naloxone **OR** naloxone, - MEDICATION INSTRUCTIONS -, ondansetron **OR** ondansetron, - MEDICATION INSTRUCTIONS -, - MEDICATION INSTRUCTIONS -, prochlorperazine **OR** prochlorperazine **OR** prochlorperazine, senna-docusate **OR** senna-docusate, sodium chloride, sodium chloride (PF), sodium chloride (PF), sodium chloride (PF), sodium chloride 0.9%, sodium chloride 0.9%, sodium chloride 0.9%, - MEDICATION INSTRUCTIONS -, - MEDICATION INSTRUCTIONS -         Physical Exam:   Temp:  [97.4  F (36.3  C)-98.9  F (37.2  C)] 98.4  F (36.9  C)  Pulse:  [55-67] 55  Resp:  [14-24] 20  BP: (106-157)/(46-77) 133/61  FiO2 (%):  [30 %] 30 %  SpO2:  [94 %-100 %] 100 %    Intake/Output Summary (Last 24 hours) at 3/27/2024 0822  Last data filed at 3/27/2024 0700  Gross per 24 hour   Intake 1832.4 ml   Output 3050 ml   Net -1217.6 ml     Constitutional:   Awake, alert and in no apparent distress     Eyes:   nonicteric     ENT:    Orally intubated     Neck:   Supple without supraclavicular or cervical lymphadenopathy     Lungs:   Good air flow.  No crackles. No rhonchi.   No wheezes.     Cardiovascular:   II/VI sys      Abdomen:   NABS, soft, nondistended, nontender.  No HSM.     Musculoskeletal:   No edema     Neurologic:   Alert and conversant.     Skin:   Warm, dry.  No rash on limited exam.             Data:   All laboratory and imaging data reviewed.    Results for orders placed or performed during the hospital encounter of 02/10/24 (from the past 24 hour(s))   Glucose by meter   Result Value Ref Range    GLUCOSE BY METER POCT 132 (H) 70 - 99 mg/dL   Glucose by meter   Result Value Ref Range    GLUCOSE BY METER POCT 146 (H) 70 - 99 mg/dL   Glucose by meter   Result Value Ref Range    GLUCOSE BY METER POCT 142 (H) 70 - 99 mg/dL   Blood gas venous   Result Value Ref Range    pH Venous 7.43 7.32 - 7.43    pCO2 Venous 50 40 - 50 mm Hg    pO2 Venous 41 25 - 47 mm Hg    Bicarbonate Venous 33 (H) 21 - 28 mmol/L    Base Excess/Deficit Venous 7.5 (H) -3.0 - 3.0 mmol/L    FIO2 30     Oxyhemoglobin Venous 72 70 - 75 %    O2 Sat, Venous 73.9 70.0 - 75.0 %    Narrative    In healthy individuals, oxyhemoglobin (O2Hb) and oxygen saturation (SO2) are approximately equal. In the presence of dyshemoglobins, oxyhemoglobin can be considerably lower than oxygen saturation.   Glucose by meter   Result Value Ref Range    GLUCOSE BY METER POCT 91 70 - 99 mg/dL   Glucose by meter   Result Value Ref Range    GLUCOSE BY METER POCT 107 (H) 70 - 99 mg/dL   Glucose by meter   Result Value Ref Range    GLUCOSE BY METER POCT 112 (H) 70 - 99 mg/dL   CBC with Platelets & Differential    Narrative    The following orders were created for panel order CBC with Platelets & Differential.  Procedure                               Abnormality         Status                     ---------                               -----------         ------                     CBC with platelets and d...[[id number]]  Abnormal            Final result               Manual Differential[[id number]]          Abnormal            Final result                  Please view results for these tests on the individual orders.   INR   Result Value Ref Range    INR 1.24 (H) 0.85 - 1.15   Blood gas venous   Result Value Ref Range    pH Venous 7.44 (H) 7.32 - 7.43    pCO2 Venous 48 40 - 50 mm Hg    pO2 Venous 37 25 - 47 mm Hg    Bicarbonate Venous 32 (H) 21 - 28 mmol/L    Base Excess/Deficit Venous 6.9 (H) -3.0 - 3.0 mmol/L    FIO2 30     Oxyhemoglobin Venous 66 (L) 70 - 75 %    O2 Sat, Venous 67.9 (L) 70.0 - 75.0 %    Narrative    In healthy individuals, oxyhemoglobin (O2Hb) and oxygen saturation (SO2) are approximately equal. In the presence of dyshemoglobins, oxyhemoglobin can be considerably lower than oxygen saturation.   Comprehensive metabolic panel   Result Value Ref Range    Sodium 138 135 - 145 mmol/L    Potassium 3.7 3.4 - 5.3 mmol/L    Carbon Dioxide (CO2) 29 22 - 29 mmol/L    Anion Gap 10 7 - 15 mmol/L    Urea Nitrogen 29.2 (H) 8.0 - 23.0 mg/dL    Creatinine 1.97 (H) 0.51 - 0.95 mg/dL    GFR Estimate 28 (L) >60 mL/min/1.73m2    Calcium 8.8 8.8 - 10.2 mg/dL    Chloride 99 98 - 107 mmol/L    Glucose 120 (H) 70 - 99 mg/dL    Alkaline Phosphatase 93 40 - 150 U/L    AST 17 0 - 45 U/L    ALT 13 0 - 50 U/L    Protein Total 5.2 (L) 6.4 - 8.3 g/dL    Albumin 3.1 (L) 3.5 - 5.2 g/dL    Bilirubin Total 0.4 <=1.2 mg/dL   Magnesium   Result Value Ref Range    Magnesium 1.8 1.7 - 2.3 mg/dL   Phosphorus   Result Value Ref Range    Phosphorus 3.9 2.5 - 4.5 mg/dL   CBC with platelets and differential   Result Value Ref Range    WBC Count 5.5 4.0 - 11.0 10e3/uL    RBC Count 2.60 (L) 3.80 - 5.20 10e6/uL    Hemoglobin 8.9 (L) 11.7 - 15.7 g/dL    Hematocrit 29.6 (L) 35.0 - 47.0 %     (H) 78 - 100 fL    MCH 34.2 (H) 26.5 - 33.0 pg    MCHC 30.1 (L) 31.5 - 36.5 g/dL    RDW 17.2 (H) 10.0 - 15.0 %    Platelet Count 191 150 - 450 10e3/uL    % Neutrophils      % Lymphocytes      % Monocytes      % Eosinophils      % Basophils      % Immature Granulocytes      NRBCs per 100  WBC 0 <1 /100    Absolute Neutrophils      Absolute Lymphocytes      Absolute Monocytes      Absolute Eosinophils      Absolute Basophils      Absolute Immature Granulocytes      Absolute NRBCs 0.0 10e3/uL   Manual Differential   Result Value Ref Range    % Neutrophils 66 %    % Lymphocytes 12 %    % Monocytes 10 %    % Eosinophils 12 %    % Basophils 0 %    Absolute Neutrophils 3.6 1.6 - 8.3 10e3/uL    Absolute Lymphocytes 0.7 (L) 0.8 - 5.3 10e3/uL    Absolute Monocytes 0.6 0.0 - 1.3 10e3/uL    Absolute Eosinophils 0.7 0.0 - 0.7 10e3/uL    Absolute Basophils 0.0 0.0 - 0.2 10e3/uL    RBC Morphology Confirmed RBC Indices     Platelet Assessment  Automated Count Confirmed. Platelet morphology is normal.     Automated Count Confirmed. Platelet morphology is normal.    Polychromasia Slight (A) None Seen     *Note: Due to a large number of results and/or encounters for the requested time period, some results have not been displayed. A complete set of results can be found in Results Review.

## 2024-03-27 NOTE — PROGRESS NOTES
Nephrology Progress Note  03/27/2024         Assessment & Recommendations:   Sofie Rodriguez is a 63 year old year old female with ESKD, COPD s/p b/l lung transplant in 6/2022 with multiple complications, gastroparesis s/p GJ tube, GIB, chronic diarrhea, recurrent c-diff, FTT with inability to tolerate any tube feeds, R hip fx s/p ORIF 12/2023, admitted on 2/10 for initiation of TPN/lipids, transferred to ICU on 2/17 with worsening mental status and respiratory acidosis in spite of bipap, ultimately intubated on 2/18, being treated for PNA, also with volume overload in setting of high volume TPN. Persistent respiratory acidosis in spite of volume off, transferred to ICU for hypotension and respiratory failure, improved on bipap, now floor status again 3/1. Transferred to ICU 3/18 again with worsening hypercapnic respiratory failure. Transferred to floor 3/20, back to ICU 3/24    # ESKD - TTS, LUE AVG, 3hr, 45.5 kg, [facility], Dr. Andres Fairbanks.  - daily HD this week for volume off, HD TTS, UF runs on off days   - Requires EMLA cream an hour before HD  - please avoid PICC which will compromise future dialysis accesses; a midline is much preferred for this patient    # Dialysis access: LUE AVG placed 3-4 months ago, per pt. She had arm swelling and a fistulogram a couple months ago and swelling improved but now has redeveloped.  - US with c/f possible steal syndrome  - per vascular surgery, no concern for steal syndrome, ok to go ahead with fistulogram  - given that AVF is working well on dialysis (450 BFR) and at this time IR is only able to perform fistulograms when AVF isn't working well, will defer to OP for management.     # Persistent respiratory acidosis: when off vent  - difficulty tolerating bipap due to claustrophobia, but wearing this lately  - balancing act between giving bicarb to maintain pH in setting of severe persistent respiratory acidosis with bicarb quickly converting CO2 and worsening  "overall status  - recommend stopping PO bicarb, ok to restart for pH < 7.1 but would stop again once pH is 7.2; will continue to provide bicarb with dialysis  - transplant pulm following  - re-intubated 3/24 for bronch/BAL (NGTD)    # Aflutter: on amio and BB  # Volume/BP: EDW 45.5 kg. Anuric; on metoprolol soln 25 mg bid   - weights are variable, unsure of accuracy; appears close to euvolemia with much improvement in LE edema  - CXR 3/25 with likely pulm edema  - 2.5L UF yesterday, net neg 1L  - UF goal 2.5L today     # Nutrition: on [employer] tube feeds     # Anemia 2/2 ESKD  On Venofer 50 qwk, Mircera last dose 1/9/2024  - hgb downdrifting, 8's  - Will continue Venofer 50 mcg q week (Tuesday)  - continue epogen 8000 units via HD    # BMD:   - Ca 8-9's, phos 3.9, alb 3.1  - sevelamer on hold         Recommendations were communicated to primary team via note     RABIA Moctezuma   Division of Renal Disease and Hypertension  [phone]    Interval History :   Seen before dialysis, 2.5L off yesterday, net neg 1L. Goal 2.5L UF today, UF only run.  Intubated, on pressure support trial. Nodding yes/no to questions and writing on note pad. Team brought up potential trach, pt expresses significant fear about this and has many questions. No n/v, CP, chills.      Review of Systems:   4 point ROS neg other than as noted above    Physical Exam:   I/O last 3 completed shifts:  In: 1914.2 [I.V.:524.2; NG/GT:550]  Out: 3150 [Other:2500; Stool:650]   /57   Pulse 62   Temp 98.4  F (36.9  C) (Axillary)   Resp 18   Ht 1.57 m (5' 1.81\")   Wt 50.6 kg (111 lb 8.8 oz)   SpO2 100%   BMI 20.53 kg/m       GENERAL APPEARANCE: on vent, alert and awake  PULM: diminished, on vent  CV: RRR    trace no peripheral  GI: soft   INTEGUMENT: no cyanosis, no rashes on exposed skin  NEURO: a/o3  Access Left AVG     Labs:   All labs reviewed by me  Electrolytes/Renal -   Recent Labs   Lab Test 03/27/24  1245 03/27/24  0827 " 03/27/24 0328 03/26/24  0415 03/26/24 0313 03/25/24  0823 03/25/24  0426   NA  --   --  138  --  138  --  138   POTASSIUM  --   --  3.7  --  3.9  --  3.5   CHLORIDE  --   --  99  --  98  --  99   CO2  --   --  29  --  26  --  27   BUN  --   --  29.2*  --  51.1*  --  34.5*   CR  --   --  1.97*  --  2.84*  --  2.00*   GLC 94 113* 120*   < > 149*   < > 116*   ESTUARDO  --   --  8.8  --  9.2  --  8.5*   MAG  --   --  1.8  --  2.2  --  1.6*   PHOS  --   --  3.9  --  5.3*  --  2.2*    < > = values in this interval not displayed.       CBC -   Recent Labs   Lab Test 03/27/24 0328 03/26/24 0313 03/25/24 0426   WBC 5.5 5.2 5.8   HGB 8.9* 8.2* 8.0*    188 179       LFTs -   Recent Labs   Lab Test 03/27/24 0328 03/26/24 0313 03/25/24  0426   ALKPHOS 93 91 93   BILITOTAL 0.4 0.5 0.6   ALT 13 11 14   AST 17 18 16   PROTTOTAL 5.2* 5.2* 4.9*   ALBUMIN 3.1* 3.2* 3.1*       Iron Panel -   Recent Labs   Lab Test 09/26/22  0555 09/03/22  1039 08/24/22  0810   IRON 54 21* 41   IRONSAT 22 9* 21   CARLOS 769* 343* 334*         Imaging:  All imaging studies reviewed by me.     Current Medications:   acetylcysteine  2 mL Nebulization 4x daily    amiodarone  400 mg Oral BID    apixaban ANTICOAGULANT  2.5 mg Oral BID    calcium carbonate-vitamin D  1 tablet Per J Tube TID w/meals    chlorhexidine  15 mL Mouth/Throat Q12H    [Held by provider] cyanocobalamin  500 mcg Per Feeding Tube Daily    cycloSPORINE modified  175 mg Per G Tube QPM    cycloSPORINE modified  200 mg Per G Tube QAM    heparin lock flush  5-20 mL Intracatheter Q24H    insulin aspart  1-4 Units Subcutaneous Q4H    levalbuterol  1.25 mg Nebulization 4x Daily    levothyroxine  25 mcg Oral QAM AC    lidocaine  1 patch Transdermal Q24h    liothyronine  2.5 mcg Oral BID    metoprolol tartrate  25 mg Per J Tube BID    midodrine  10 mg Oral 3 times per day on Tuesday Thursday Saturday    OLANZapine zydis  5 mg Oral At Bedtime    pantoprazole  40 mg Per J Tube BID     piperacillin-tazobactam  2.25 g Intravenous Q6H    predniSONE  5 mg Per J Tube QAM    And    predniSONE  2.5 mg Per J Tube QPM    [Held by provider] sevelamer carbonate (RENVELA)  0.8 g Oral BID    [Held by provider] sodium bicarbonate  1,300 mg Oral or Feeding Tube TID    sodium chloride (PF)  10 mL Intracatheter Q8H    sodium chloride (PF)  10-40 mL Intracatheter Q8H    sulfamethoxazole-trimethoprim  1 tablet Oral Q Mon Wed Fri AM      dexmedeTOMIDine Stopped (03/27/24 0800)    dextrose      fentaNYL Stopped (03/25/24 1306)    - MEDICATION INSTRUCTIONS -      norepinephrine Stopped (03/25/24 0200)    - MEDICATION INSTRUCTIONS -      - MEDICATION INSTRUCTIONS -      sodium chloride 0.9%      - MEDICATION INSTRUCTIONS -       RABIA Moctezuma

## 2024-03-27 NOTE — PROGRESS NOTES
HEMODIALYSIS TREATMENT NOTE    Date: 3/27/2024  Time: 2.00 PM    Data:  Pre Wt: 50.6 kg (111 lb 8.8 oz)   Desired Wt:2.5   kg   Post Wt: 48.1 kg (108 lb 7.5 oz)  Weight change: 2.5 kg  Ultrafiltration - Post Run Net Total Removed : 2500 mL  Vascular Access Status: patent  Dialyzer Rinse: Clear  Total Blood Volume Processed: UF only   Total Dialysis (Treatment) Time: 2.5    Lab:   No    Interventions:  Pt had scheduled 2.5 hours UF only treatment, 2.5kg UF removed per prescription, no issue.  Pt hemodynamically stable throughout the treatment, Crit-line steady with A profile at the end of HD run.  Pt completed her treatment time, blood rinsed back, Graft hemostasis achieved in less than 10 minutes, clean dressing applied & handoff report given.      Assessment:  Pt alert , on mechanical ventilator, calm & co-operative.  Monitoring every 15 minutes & PRN.  AVG thrill & bruit present.     Plan:    Per Renal Team.

## 2024-03-27 NOTE — PROGRESS NOTES
MEDICAL ICU PROGRESS NOTE  03/27/2024      Date of Service (when I saw the patient): 03/27/2024    ASSESSMENT: Sofie Rodriguez is a 63 year old female with PMH COPD s/p bilateral lung transplant 6/28/22 c/b hemidiaphragm palsy and recurrent pneumonias, gastroparesis and small bowel dysmotility complicated by severe malnutrition now s/p PEG/J, ESRD on M/W/F HD, recent R femoral fx s/p ORIF, chronic diarrhea, recurrent c-diff, failure to thrive with inability to tolerate any tube feeds, who was admitted to [facility] on 2/10/24 for concerns over malnutrition and TPN initiation via portacath. Course complicated by recurrent and progressive acute on chronic hypoxic and hypercarbic respiratory failure requiring multiple ICU admissions and intubation 2/18-2/19, 2/28-2/29, 3/18-3/20, for continuous BiPAP. Again with worsening respiratory acidosis and hypercarbia, concern for infection vs acute rejection, requiring transfer back to ICU 3/20/2024 for intubation and bronchoscopy.    CHANGES and MAJOR THINGS TODAY:   - Stool sodium and potassium ordered  - Changed pressure support settings   - RR on CMV settings changed  - HD run today    PLAN:     Neuro:  # Acute pain  # Sedation  Intubated and awake, alert. Denies new or acute pain. Uses lidocaine and heating pads as needed for pain management.   - Precedex to achieve sedation goal, wean as tolerates   - RASS goal 0 to -1  - Tylenol Q4 prn  - Lidocaine patch prn  - Heating pads prn    # Hx of Anxiety   # Claustrophobia  Reports claustrophobia contributing to limited compliance with BiPAP. Has seen health psych on 3/20, recommended grounding exercise (5-4-3-2-1) for use on BiPAP.   - Zyprexa 5mg at bedtime   - Atarax 25mg TID PRN for anxiety  - Ativan 0.25 mg PRN for anxiety     # B/L Neuropathic Pain   New pain b/l this hospitalization. Last A1c <4.2 (7/11/23). Consider possibly 2/2 ESRD. TSH wnl. B12 and B1 elevated, B6 normal. Copper low (56.3), zinc mildly low  (48.3).  B3 in process.   - Consider gabapentin in the coming days   - Neuro Recs:  - No obvious clinical history or exam findings to suggest neurologic/neuromuscular causes of respiratory weakness  - Neuropathy labs currently pending  - Holding B12 supplement (supra-therapeutic 3/15)      Pulmonary:  # Acute on chronic hypoxic and hypercarbic respiratory failure  # Recurrent PNAs, concern for acute infection vs acute rejection  # S/p BSLT 6/8/22 for COPD  # R hemidiaphragm palsy   # Positive DSA   # Suspected CARLEE  # PTA nocturnal O2, 2L   S/p bilateral lung transplant 6/28/22 for COPD. Was admitted 2/10 to address malnutrition and admitted to ICU on 2/17 with hypoxic hypercarbic respiratory failure. Intubated on 2/18 due to worsening oxygen requirements, likely due to pulmonary edema given hx of ESRD requiring HD vs infection given hx of lung transplant, immunosuppressed status, and recurrent pneumonias. Required intubation 2/17 - 2/19. Ongoing respiratory acidosis despite BiPAP/AVAPS, claustrophobic while using to intermittent compliance.  Neurology consulted and MRI r/o central cause problem for respiratory drive. Started on AVAPS with improvement in mental status and VBG, and patient transferred back to medicine floor on 2/29. 3/08 SNIFF with no definite paradoxical movement of bilateral hemidiaphragm. Transferred back to ICU 3/18-3/20 d/t hypercarbia, severe respiratory acidosis, but did not require intubation during this time. Issues with anxiety/claustrophobia while using the mask, Atarax has helped though Zyprexa led to hallucinations. Patient's baseline appears to be with pH mid 7.2s and pCO2  mid 70-80s at best. Even with pH this low, and pCO2 that high, mentation remains stable. CT chest 3/24 w/ scattered opacity throughout all lungs; dilated pulm artery. On BiPAP 16/5 at time of transfer. Transplant pulm concerned for infectious vs acute rejection picture, recommending intubation + bronchoscopy. Volume  overload + pulmonary edema complicating picture, as patient has had low pressures limiting HD runs.   - Daily VBG  - Transplant pulm following, appreciate recs               - 3/18 prospera in process  - Immunosuppression:   >Prednisone 5 mg every morning, 2.5 mg every afternoon  >Cyclosporine 200mg BID (Stopped tacrolimus 3/10)   >Overnight oximetry study suggestive of O2 vs CPAP need, as did require up to 2LPM overnight to prevent hypoxia  >Try to minimize O2 to preserve respiratory drive, will give IVIG for DSA+   >D/c'd theophylline 3/3/24 due to difficulty attaining therapeutic levels (started by ICU), could consider Modafinil   >Repeat metabolic CART study ordered by Transplant Pulm   - Airway clearance/nebs:   - Xopenex neb BID  - Hypertonic saline nebs q 3 hours PRN   - Volume removal with iHD               - Will need to place lines for CRRT if unable to run UF  - Sleep clinic eval at discharge for suspected CARLEE  - Limit medications that would depress respiration  - RR on ventilator changed to 12 on 03/27     # Goals of Care  Care conference on 3/15 with Transplant Pulm, [facility], Medicine, daughters (Charity, Julia) and Transplant SW. Overall medical updates provided and Qs answered. With declining respiratory acidosis on labs, discussed code status 3/18. Patient initially not desiring any escalation of her care to ICU/intubation with frustration re overall course. However, after discussing with her daughter on the phone she and family elected to remain full code and agreed to ICU admission and intubation if necessary.   - Transplant Pulm requesting additional care conference in coming days, pending clinical course      Cardiovascular:  # A-flutter (recurrent on 3/17)  # A-fib, intermittent  # HTN  # HFpEF  Noted atrial fibrillation on arrival with HR elevated at 150, not sustained for > 10 minutes and otherwise hemodynamically stable. RVR resolved w/o medications.  PTA metoprolol (25mg BID) has been held  through much of admission. On 3/17 new Aflutter developed overnight. HR 150s with flutter waves on EKG. Metoprolol administered along with Mg infusion with minimal response. Patient was then given amiodarone bolus and placed on drip which slightly lowered her HR in 120-130s. This was held for borderline hypotension 3/18, but restarted again with amiodarone 3/19, transition to current regimen on 3/21. 2/17 TTE: LVEF 55-60%, global RV function normal, no significant valvular abnormalities. Briefly required levophed and midodrine for HD but otherwise stable off pressors.   - MAP goal > 65 mmHg   - Metoprolol tartrate dose 25 mg BID (hold if MAP<65)  - Continue amiodarone 400mg BID PO  - Midodrine with HD  - Continuous telemetry  - Metoprolol dose held in AM of 03/27 for bradycardia and will administer evening dose as scheduled      GI/Nutrition:  # Severe malnutrition   # Failure to thrive  # Hypoalbuminemia  # Gastroparesis, small bowel dysmotility  # S/P PEG/J with intolerance of enteral nutrition  #  Chronic osmotic diarrhea/SIBO s/p Rifaximin  # Recurrent C.Diff (3rd ep 3/12/24)    # GERD  Patient with gastroparesis (presumed due to vagal injury) and small bowel dysmotility complicated by unintentional 40lb weight loss over the past year and now severe malnutrition. Previously intolerant to oral food intake due to nausea. Was initially admitted for portacath and TPN initiation since 2/13. Intermittently tolerating feeds without n/v from 2/20.  Per GI, no ongoing concerns for SIBO as of 2/23. As of 3/11 transplant pulmonology is worried that TPN is not a realistic plan to continue at home and would like to transition back to TF (RD oncerned pt is not absorbing any oral intake). TPN discontinued 3/16. Transplant pulm also worried about mucosal toxicity. Recurrent C.diff 3/12, Fidaxomicin x 10 days (3/13-3/22), with improvement in diarrhea. Transplant pulm requesting repeat colonoscopy w/ Bx after completion of c.diff  treatment, to r/o toxicity or other reason that diarrhea is present.   - Nutrition consulted, appreciate assistance  - Continue TF at goal rate 35 ml/hr via PEG/J          - Consider reconsult GI week of 3/25/after metabolic CART study, for future colonoscopy +/- biopsy if diarrhea returns   - PPI BID  - Bowel regimen PRN  - Stool potassium and sodium sent for stool osmotic test to evaluate cause  - If concerned regarding osmotic diarrhea, please order stool sodium and stool potassium and utilize these values to calculate the stool osmotic gap using formula: 290 - 2 (Stool Sodium + Stool Potassium).  - An osmotic gap < 50 mOsm/kg is considered normal.  - An osmotic gap > 125 mOsm/kg is consistent with a pure osmotic diarrhea.   - Differential for osmotic diarrhea includes medications (antibiotics, lactulose, antacids, sorbitol, laxative abuse, magnesium ingestion), carbohydrate malabsorption/disaccharidase deficiencies (i.e., lactose intolerance), high ingestion of sugar substitutes or fructose.       Renal/Fluids/Electrolytes:  # ESRD on HD  # Hypervolemic hyponatremia, resolved  # Mild hyperphosphatemia  # Anion gap metabolic acidosis - resolved  Patient is ESRD on T/Th/Sat HD as outpt, now approx M/W/F inpatient. Hgb stabilizing. HD tolerating until 3/23. Briefly required extra Monday runs, not since being off TPN. She would prefer longer sessions to more days.  - Midodrine 10mg q8h with dialysis days   - Currently holding Sevelamer but may need to resume in the coming days per Nephrology   - CBC and CMP daily  - Strict I/Os  - Daily weight   - Renally adjust medications      Endocrine:  # Hyperglycemia, stress induced  - Low dose sliding scale insulin  - Hypoglycemia protocol     # Hypothyroidism  Patient with new (2/17/24) low T4 at 0.64 and TSH at 6.5, concerning for new hypothyroidism vs sick thyroid syndrome. TSH with appropriate response, more consistent with elevation in the setting of acute illness. ICU  team on  recommended initiation of treatment for possible hypothyroid as this can improve respiratory muscle function in the setting of weakness and malnutrition. 3/7, 3/15, 3/20 repeat thyroid function WNL.  - Continue liothyronine + levothyroxine -- note that prolonged combination therapy is not optimal & regimen should be re-evaluated (likely stop liothyronine, up-titrate levothyroxine) in post-acute setting      ID:  # Recurrent pneumonia, concern for acute infection vs rejection  # Recurrent C.Diff colitis (3rd ep 3/12/24)  # Hx EBV viremia   # Hypogammaglobulinemia  # Chronic immunosuppression  lung transplant  Empirically treated for pneumonia  - , RVP and cultures no growth to date. Recurrent C.Diff Positive 3/12, s/p PO Fidaxomicin 200 mg BID for 10 days (3/13-3/22) with improvement in diarrhea. Remains afebrile, leukocytosis resolved.  Ig, s/p IVIG.  EBV 27K (decreased compared to prior).     - Follow up BAL and blood cultures from 3/24  - Continue empiric antibiotic as below    Antibiotics:  Zosyn ( - , 3/24-current)  Bactrim (MWF, PJP ppx, previously on Dapsone)  Vancomycin ( - , 3/24-3/25)  Micafungin (- , 3/24 x1)  Fidaxomicin (3/13-3/22)      Hematology:    #Acute on chronic anemia  ESRD  Hgb stable, with no acute signs of bleeding. Acute drop  from 8.2 > 6.6 after two rechecks, no overt signs of bleeding; required 1U pRBC transfusion.    - Daily CBC  - Transfuse for Hgb < 7  - Continue Epogen with dialysis, held in setting of concern for acute infection   - Continue Venofer 50 mcg qweek with dialysis, held in setting of concern for acute infection     #Steal physiology of LUE dialysis access fistula  LUE arterial US obtained  with concern for LUE edema. US of fistula demonstrated steal physiology. Discussed with nephrology, and vascular surgery consulted on . Vascular surgery has only minor concerns for steal symptoms and given that  the AVF is working well, they are okay with outpatient fistulograms/ venoplasty with wrist brachial index and PPG's, unless new concerns arise.  - Plan for outpatient workup as above; nephrology in agreement with outpatient workup.      Musculoskeletal:  # Right hip fracture s/p ORIF (December 2023)   - PT/OT to eval and treat      Skin:  # Left upper extremity unilateral edema, improving   # Bilateral pedal and ankle edema, improving  Edema due to third spacing due to hypoalbuminemia vs HF. BNP >68168 at admission, Echo on 2/18 shows EF of 55-60% with collapsible IVC. Extremity edema 2/2 to hypoalbuminemia. Upper limb duplex USG on 2/18 negative for DVT.  USG left arm on 2/21 shows steal physiology and Vascular Surgery consulted (see Heme section). Overall edema in extremities have improved significantly with dialysis.    - Elevation and wrapping of only lower legs as needed and increased UF per nephrology for volume management; no wrapping of left upper extremity with dialysis fistula      General Cares/Prophylaxis:    DVT Prophylaxis: Apixaban  GI Prophylaxis: PPI  Restraints: N/A  Family Communication: Daughters Charity & Julia  Code Status: FULL      Lines/tubes/drains:  - PEG/J  - PICC line in RUE   - PIV x1  - Rectal tube      Disposition:  - Medical ICU      Patient seen and findings/plan discussed with medical ICU staff, Dr. Franks.    I spent a total of 35 minutes (excluding procedure time) personally providing and directing critical care services at the bedside and on the critical care unit for Sofie Rodriguez      Clinically Significant Risk Factors              # Hypoalbuminemia: Lowest albumin = 2.6 g/dL at 2/18/2024  5:13 AM, will monitor as appropriate  # Coagulation Defect: INR = 1.24 (Ref range: 0.85 - 1.15) and/or PTT = N/A, will monitor for bleeding            # Severe Malnutrition: based on nutrition assessment      # Financial/Environmental Concerns: none             ====================================  INTERVAL HISTORY:   No acute overnight events. Patient bradycardic during AM assessment, but hemodynamically stable. AM dose of metoprolol held for bradycardia, with the intent to resume PM dose if bradycardia resolved. Patient was incidentally also on precedex infusion at time of bradycardia. Plan to pressure support today as long as patient can tolerate including overnight, if patient can tolerate.    OBJECTIVE:   1. VITAL SIGNS:   Temp:  [97.4  F (36.3  C)-98.9  F (37.2  C)] 98.4  F (36.9  C)  Pulse:  [55-67] 63  Resp:  [14-24] 21  BP: (120-157)/(50-77) 125/50  FiO2 (%):  [30 %] 30 %  SpO2:  [97 %-100 %] 100 %  Vent Mode: (S) CPAP/PS  (Continuous positive airway pressure with Pressure Support)  FiO2 (%): 30 %  Resp Rate (Set): 16 breaths/min  Tidal Volume (Set, mL): 300 mL  PEEP (cm H2O): 5 cmH2O  Pressure Support (cm H2O): 12 cmH2O  Resp: 21  PIP 25    2. INTAKE/ OUTPUT:   I/O last 3 completed shifts:  In: 1914.2 [I.V.:524.2; NG/GT:550]  Out: 3150 [Other:2500; Stool:650]  Net -2106 past 24h    3. PHYSICAL EXAMINATION:  General: awake, alert & oriented, resting in bed, communicating via writing  HEENT: Mucous membranes moist  Neuro: Awake and alert, no focal deficits, moving all extremities to command and spontaneously  Pulm/Resp: Coarse breath sounds bilaterally, diminished on R>L, no accessory muscle use  CV: RRR, S1/S2 without m/r/g; pedal pulses 2+ without LE edema  Abdomen: Soft, non-distended, non-tender  : rectal tube in place  Incisions/Skin: PICC and PEG site without surrounding erythema, no rashes noted    4. LABS:   Venous Blood Gas  Recent Labs   Lab 03/27/24  0328 03/26/24  1612 03/26/24  0313 03/25/24  1637   PHV 7.44* 7.43 7.38 7.39   PCO2V 48 50 48 49   PO2V 37 41 40 39   HCO3V 32* 33* 28 30*   LINDY 6.9* 7.5* 2.2 4.0*   O2PER 30 30 30 30     Complete Blood Count   Recent Labs   Lab 03/27/24  0328 03/26/24  0313 03/25/24  0426 03/24/24  0434   WBC 5.5 5.2  5.8 7.8   HGB 8.9* 8.2* 8.0* 9.1*    188 179 169     Basic Metabolic Panel  Recent Labs   Lab 03/27/24  1245 03/27/24  0827 03/27/24  0328 03/27/24  0327 03/26/24  0415 03/26/24  0313 03/25/24  0823 03/25/24  0426 03/24/24  2348 03/24/24  2119   NA  --   --  138  --   --  138  --  138  --  138   POTASSIUM  --   --  3.7  --   --  3.9  --  3.5  --  3.6   CHLORIDE  --   --  99  --   --  98  --  99  --  99   CO2  --   --  29  --   --  26  --  27  --  28   BUN  --   --  29.2*  --   --  51.1*  --  34.5*  --  25.2*   CR  --   --  1.97*  --   --  2.84*  --  2.00*  --  1.64*   GLC 94 113* 120* 112*   < > 149*   < > 116*   < > 106*    < > = values in this interval not displayed.     Liver Function Tests  Recent Labs   Lab 03/27/24 0328 03/26/24 0313 03/25/24 0426 03/24/24  0434   AST 17 18 16 16   ALT 13 11 14 11   ALKPHOS 93 91 93 134   BILITOTAL 0.4 0.5 0.6 0.4   ALBUMIN 3.1* 3.2* 3.1* 3.5   INR 1.24* 1.42* 1.50* 1.22*     Coagulation Profile  Recent Labs   Lab 03/27/24 0328 03/26/24 0313 03/25/24  0426 03/24/24  0434   INR 1.24* 1.42* 1.50* 1.22*     Micro:   - BAL 3/24:   - Cell count w diff: PMN 75%, lymphocytes 5, monocytes 19, eos 1  - No organisms seen on Gram stain  - RVP, HSV, Histoplasma neg  - Fungal & bacterial cultures NGTD  - EBV, CMV, PJP, Aspergillus, Legionella, Mycoplasma, AFB in process  - Bcx 3/24 NGTD    5. RADIOLOGY:   No results found for this or any previous visit (from the past 24 hour(s)).

## 2024-03-27 NOTE — PROGRESS NOTES
RT attempted pressure support @ 5/5, patient's tidal volumes between 90mL and 200mL. RT placed on pressure support @ 12/5.    Angelito Das, RT

## 2024-03-28 ENCOUNTER — APPOINTMENT (OUTPATIENT)
Dept: PHYSICAL THERAPY | Facility: CLINIC | Age: 64
DRG: 640 | End: 2024-03-28
Attending: INTERNAL MEDICINE
Payer: MEDICARE

## 2024-03-28 LAB
ALBUMIN SERPL BCG-MCNC: 3.2 G/DL (ref 3.5–5.2)
ALP SERPL-CCNC: 85 U/L (ref 40–150)
ALT SERPL W P-5'-P-CCNC: 12 U/L (ref 0–50)
ANION GAP SERPL CALCULATED.3IONS-SCNC: 13 MMOL/L (ref 7–15)
AST SERPL W P-5'-P-CCNC: 16 U/L (ref 0–45)
BASE EXCESS BLDV CALC-SCNC: 3.8 MMOL/L (ref -3–3)
BASE EXCESS BLDV CALC-SCNC: 6.5 MMOL/L (ref -3–3)
BASOPHILS # BLD AUTO: ABNORMAL 10*3/UL
BASOPHILS # BLD MANUAL: 0.1 10E3/UL (ref 0–0.2)
BASOPHILS NFR BLD AUTO: ABNORMAL %
BASOPHILS NFR BLD MANUAL: 2 %
BILIRUB SERPL-MCNC: 0.3 MG/DL
BLAIMP ISLT/SPM QL: NOT DETECTED
BLAKPC ISLT/SPM QL: NOT DETECTED
BLAOXA-48 ISLT/SPM QL: NOT DETECTED
BLAVIM ISLT/SPM QL: NOT DETECTED
BUN SERPL-MCNC: 49.2 MG/DL (ref 8–23)
CALCIUM SERPL-MCNC: 8.9 MG/DL (ref 8.8–10.2)
CHLORIDE SERPL-SCNC: 101 MMOL/L (ref 98–107)
CREAT SERPL-MCNC: 3.22 MG/DL (ref 0.51–0.95)
DEPRECATED HCO3 PLAS-SCNC: 27 MMOL/L (ref 22–29)
EGFRCR SERPLBLD CKD-EPI 2021: 16 ML/MIN/1.73M2
EOSINOPHIL # BLD AUTO: ABNORMAL 10*3/UL
EOSINOPHIL # BLD MANUAL: 0.3 10E3/UL (ref 0–0.7)
EOSINOPHIL NFR BLD AUTO: ABNORMAL %
EOSINOPHIL NFR BLD MANUAL: 6 %
ERYTHROCYTE [DISTWIDTH] IN BLOOD BY AUTOMATED COUNT: 17.4 % (ref 10–15)
GLUCOSE BLDC GLUCOMTR-MCNC: 105 MG/DL (ref 70–99)
GLUCOSE BLDC GLUCOMTR-MCNC: 116 MG/DL (ref 70–99)
GLUCOSE BLDC GLUCOMTR-MCNC: 122 MG/DL (ref 70–99)
GLUCOSE BLDC GLUCOMTR-MCNC: 127 MG/DL (ref 70–99)
GLUCOSE BLDC GLUCOMTR-MCNC: 151 MG/DL (ref 70–99)
GLUCOSE BLDC GLUCOMTR-MCNC: 93 MG/DL (ref 70–99)
GLUCOSE BLDC GLUCOMTR-MCNC: 94 MG/DL (ref 70–99)
GLUCOSE SERPL-MCNC: 110 MG/DL (ref 70–99)
HCO3 BLDV-SCNC: 30 MMOL/L (ref 21–28)
HCO3 BLDV-SCNC: 33 MMOL/L (ref 21–28)
HCT VFR BLD AUTO: 27 % (ref 35–47)
HGB BLD-MCNC: 8.1 G/DL (ref 11.7–15.7)
IMM GRANULOCYTES # BLD: ABNORMAL 10*3/UL
IMM GRANULOCYTES NFR BLD: ABNORMAL %
INR PPP: 1.25 (ref 0.85–1.15)
LYMPHOCYTES # BLD AUTO: ABNORMAL 10*3/UL
LYMPHOCYTES # BLD MANUAL: 0.7 10E3/UL (ref 0.8–5.3)
LYMPHOCYTES NFR BLD AUTO: ABNORMAL %
LYMPHOCYTES NFR BLD MANUAL: 15 %
MAGNESIUM SERPL-MCNC: 2 MG/DL (ref 1.7–2.3)
MCH RBC QN AUTO: 34.2 PG (ref 26.5–33)
MCHC RBC AUTO-ENTMCNC: 30 G/DL (ref 31.5–36.5)
MCV RBC AUTO: 114 FL (ref 78–100)
MONOCYTES # BLD AUTO: ABNORMAL 10*3/UL
MONOCYTES # BLD MANUAL: 0.4 10E3/UL (ref 0–1.3)
MONOCYTES NFR BLD AUTO: ABNORMAL %
MONOCYTES NFR BLD MANUAL: 9 %
NDM TARGET DNA: NOT DETECTED
NEUTROPHILS # BLD AUTO: ABNORMAL 10*3/UL
NEUTROPHILS # BLD MANUAL: 3.1 10E3/UL (ref 1.6–8.3)
NEUTROPHILS NFR BLD AUTO: ABNORMAL %
NEUTROPHILS NFR BLD MANUAL: 67 %
NRBC # BLD AUTO: 0 10E3/UL
NRBC BLD AUTO-RTO: 0 /100
O2/TOTAL GAS SETTING VFR VENT: 30 %
O2/TOTAL GAS SETTING VFR VENT: 30 %
OXYHGB MFR BLDV: 73 % (ref 70–75)
OXYHGB MFR BLDV: 76 % (ref 70–75)
P JIROVECII DNA SPEC QL NAA+PROBE: NOT DETECTED
PCO2 BLDV: 55 MM HG (ref 40–50)
PCO2 BLDV: 56 MM HG (ref 40–50)
PH BLDV: 7.35 [PH] (ref 7.32–7.43)
PH BLDV: 7.38 [PH] (ref 7.32–7.43)
PHOSPHATE SERPL-MCNC: 5.7 MG/DL (ref 2.5–4.5)
PLAT MORPH BLD: ABNORMAL
PLATELET # BLD AUTO: 180 10E3/UL (ref 150–450)
PO2 BLDV: 43 MM HG (ref 25–47)
PO2 BLDV: 46 MM HG (ref 25–47)
POTASSIUM SERPL-SCNC: 3.4 MMOL/L (ref 3.4–5.3)
POTASSIUM STL-SCNC: 16 MMOL/L
PROMYELOCYTES # BLD MANUAL: 0 10E3/UL
PROMYELOCYTES NFR BLD MANUAL: 1 %
PROT SERPL-MCNC: 5.2 G/DL (ref 6.4–8.3)
RBC # BLD AUTO: 2.37 10E6/UL (ref 3.8–5.2)
RBC MORPH BLD: ABNORMAL
SAO2 % BLDV: 74.6 % (ref 70–75)
SAO2 % BLDV: 77.6 % (ref 70–75)
SCANNED LAB RESULT: ABNORMAL
SCANNED LAB RESULT: ABNORMAL
SODIUM SERPL-SCNC: 141 MMOL/L (ref 135–145)
SODIUM STL-SCNC: 99 MMOL/L
WBC # BLD AUTO: 4.7 10E3/UL (ref 4–11)

## 2024-03-28 PROCEDURE — 85007 BL SMEAR W/DIFF WBC COUNT: CPT

## 2024-03-28 PROCEDURE — 250N000013 HC RX MED GY IP 250 OP 250 PS 637

## 2024-03-28 PROCEDURE — 82805 BLOOD GASES W/O2 SATURATION: CPT

## 2024-03-28 PROCEDURE — 80053 COMPREHEN METABOLIC PANEL: CPT

## 2024-03-28 PROCEDURE — 250N000012 HC RX MED GY IP 250 OP 636 PS 637

## 2024-03-28 PROCEDURE — 99207 PR NO BILLABLE SERVICE THIS VISIT: CPT

## 2024-03-28 PROCEDURE — 200N000002 HC R&B ICU UMMC

## 2024-03-28 PROCEDURE — 250N000009 HC RX 250

## 2024-03-28 PROCEDURE — 83735 ASSAY OF MAGNESIUM: CPT | Performed by: STUDENT IN AN ORGANIZED HEALTH CARE EDUCATION/TRAINING PROGRAM

## 2024-03-28 PROCEDURE — 250N000012 HC RX MED GY IP 250 OP 636 PS 637: Performed by: INTERNAL MEDICINE

## 2024-03-28 PROCEDURE — 94799 UNLISTED PULMONARY SVC/PX: CPT

## 2024-03-28 PROCEDURE — 99233 SBSQ HOSP IP/OBS HIGH 50: CPT | Performed by: PHYSICIAN ASSISTANT

## 2024-03-28 PROCEDURE — 87798 DETECT AGENT NOS DNA AMP: CPT | Performed by: INTERNAL MEDICINE

## 2024-03-28 PROCEDURE — 85027 COMPLETE CBC AUTOMATED: CPT

## 2024-03-28 PROCEDURE — 250N000013 HC RX MED GY IP 250 OP 250 PS 637: Performed by: STUDENT IN AN ORGANIZED HEALTH CARE EDUCATION/TRAINING PROGRAM

## 2024-03-28 PROCEDURE — 85610 PROTHROMBIN TIME: CPT | Performed by: STUDENT IN AN ORGANIZED HEALTH CARE EDUCATION/TRAINING PROGRAM

## 2024-03-28 PROCEDURE — 250N000012 HC RX MED GY IP 250 OP 636 PS 637: Performed by: NURSE PRACTITIONER

## 2024-03-28 PROCEDURE — 94003 VENT MGMT INPAT SUBQ DAY: CPT

## 2024-03-28 PROCEDURE — 258N000003 HC RX IP 258 OP 636: Performed by: INTERNAL MEDICINE

## 2024-03-28 PROCEDURE — 250N000011 HC RX IP 250 OP 636

## 2024-03-28 PROCEDURE — 84100 ASSAY OF PHOSPHORUS: CPT | Performed by: STUDENT IN AN ORGANIZED HEALTH CARE EDUCATION/TRAINING PROGRAM

## 2024-03-28 PROCEDURE — 97530 THERAPEUTIC ACTIVITIES: CPT | Mod: GP

## 2024-03-28 PROCEDURE — 94640 AIRWAY INHALATION TREATMENT: CPT

## 2024-03-28 PROCEDURE — 999N000157 HC STATISTIC RCP TIME EA 10 MIN

## 2024-03-28 PROCEDURE — 94640 AIRWAY INHALATION TREATMENT: CPT | Mod: 76

## 2024-03-28 PROCEDURE — 999N000253 HC STATISTIC WEANING TRIALS

## 2024-03-28 PROCEDURE — 99291 CRITICAL CARE FIRST HOUR: CPT | Mod: FS

## 2024-03-28 PROCEDURE — 97112 NEUROMUSCULAR REEDUCATION: CPT | Mod: GP

## 2024-03-28 PROCEDURE — 90937 HEMODIALYSIS REPEATED EVAL: CPT

## 2024-03-28 RX ORDER — LORAZEPAM 2 MG/ML
0.5 INJECTION INTRAMUSCULAR EVERY 6 HOURS PRN
Status: DISCONTINUED | OUTPATIENT
Start: 2024-03-28 | End: 2024-03-28

## 2024-03-28 RX ORDER — CLONIDINE HYDROCHLORIDE 0.1 MG/1
0.1 TABLET ORAL AT BEDTIME
Status: DISCONTINUED | OUTPATIENT
Start: 2024-03-28 | End: 2024-04-15 | Stop reason: HOSPADM

## 2024-03-28 RX ORDER — LORAZEPAM 0.5 MG/1
0.5 TABLET ORAL EVERY 6 HOURS PRN
Status: DISCONTINUED | OUTPATIENT
Start: 2024-03-28 | End: 2024-04-02

## 2024-03-28 RX ORDER — AMIODARONE HYDROCHLORIDE 200 MG/1
400 TABLET ORAL 2 TIMES DAILY
Status: COMPLETED | OUTPATIENT
Start: 2024-03-28 | End: 2024-04-01

## 2024-03-28 RX ADMIN — PIPERACILLIN AND TAZOBACTAM 2.25 G: 2; .25 INJECTION, POWDER, FOR SOLUTION INTRAVENOUS at 19:44

## 2024-03-28 RX ADMIN — LEVALBUTEROL HYDROCHLORIDE 1.25 MG: 1.25 SOLUTION RESPIRATORY (INHALATION) at 11:49

## 2024-03-28 RX ADMIN — ACETYLCYSTEINE 2 ML: 100 SOLUTION ORAL; RESPIRATORY (INHALATION) at 11:49

## 2024-03-28 RX ADMIN — CHLORHEXIDINE GLUCONATE 0.12% ORAL RINSE 15 ML: 1.2 LIQUID ORAL at 08:58

## 2024-03-28 RX ADMIN — Medication 40 MG: at 09:01

## 2024-03-28 RX ADMIN — APIXABAN 2.5 MG: 2.5 TABLET, FILM COATED ORAL at 08:58

## 2024-03-28 RX ADMIN — LEVALBUTEROL HYDROCHLORIDE 1.25 MG: 1.25 SOLUTION RESPIRATORY (INHALATION) at 20:13

## 2024-03-28 RX ADMIN — Medication 2.5 MCG: at 19:45

## 2024-03-28 RX ADMIN — MIDODRINE HYDROCHLORIDE 10 MG: 5 TABLET ORAL at 06:16

## 2024-03-28 RX ADMIN — ACETAMINOPHEN 975 MG: 325 TABLET, FILM COATED ORAL at 19:43

## 2024-03-28 RX ADMIN — Medication 2.5 MCG: at 09:03

## 2024-03-28 RX ADMIN — OXYMETAZOLINE HYDROCHLORIDE 2 SPRAY: 0.05 SPRAY NASAL at 09:05

## 2024-03-28 RX ADMIN — METOPROLOL TARTRATE 25 MG: 25 TABLET, FILM COATED ORAL at 08:58

## 2024-03-28 RX ADMIN — CALCIUM CARBONATE 600 MG (1,500 MG)-VITAMIN D3 400 UNIT TABLET 1 TABLET: at 17:10

## 2024-03-28 RX ADMIN — LIDOCAINE AND PRILOCAINE: 25; 25 CREAM TOPICAL at 09:04

## 2024-03-28 RX ADMIN — PIPERACILLIN AND TAZOBACTAM 2.25 G: 2; .25 INJECTION, POWDER, FOR SOLUTION INTRAVENOUS at 13:43

## 2024-03-28 RX ADMIN — SODIUM CHLORIDE 250 ML: 9 INJECTION, SOLUTION INTRAVENOUS at 11:38

## 2024-03-28 RX ADMIN — OLANZAPINE 5 MG: 5 TABLET, ORALLY DISINTEGRATING ORAL at 22:44

## 2024-03-28 RX ADMIN — Medication 40 MG: at 19:44

## 2024-03-28 RX ADMIN — LEVOTHYROXINE SODIUM 25 MCG: 0.03 TABLET ORAL at 06:05

## 2024-03-28 RX ADMIN — CHLORHEXIDINE GLUCONATE 0.12% ORAL RINSE 15 ML: 1.2 LIQUID ORAL at 19:44

## 2024-03-28 RX ADMIN — ACETYLCYSTEINE 2 ML: 100 SOLUTION ORAL; RESPIRATORY (INHALATION) at 20:13

## 2024-03-28 RX ADMIN — CALCIUM CARBONATE 600 MG (1,500 MG)-VITAMIN D3 400 UNIT TABLET 1 TABLET: at 11:15

## 2024-03-28 RX ADMIN — PIPERACILLIN AND TAZOBACTAM 2.25 G: 2; .25 INJECTION, POWDER, FOR SOLUTION INTRAVENOUS at 09:01

## 2024-03-28 RX ADMIN — METOPROLOL TARTRATE 25 MG: 25 TABLET, FILM COATED ORAL at 19:44

## 2024-03-28 RX ADMIN — CYCLOSPORINE 175 MG: 100 SOLUTION ORAL at 17:11

## 2024-03-28 RX ADMIN — PREDNISONE 2.5 MG: 2.5 TABLET ORAL at 19:44

## 2024-03-28 RX ADMIN — INSULIN ASPART 1 UNITS: 100 INJECTION, SOLUTION INTRAVENOUS; SUBCUTANEOUS at 13:42

## 2024-03-28 RX ADMIN — CYCLOSPORINE 175 MG: 100 SOLUTION ORAL at 09:04

## 2024-03-28 RX ADMIN — ACETYLCYSTEINE 2 ML: 100 SOLUTION ORAL; RESPIRATORY (INHALATION) at 07:35

## 2024-03-28 RX ADMIN — CLONIDINE HYDROCHLORIDE 0.1 MG: 0.1 TABLET ORAL at 22:44

## 2024-03-28 RX ADMIN — LEVALBUTEROL HYDROCHLORIDE 1.25 MG: 1.25 SOLUTION RESPIRATORY (INHALATION) at 07:35

## 2024-03-28 RX ADMIN — APIXABAN 2.5 MG: 2.5 TABLET, FILM COATED ORAL at 19:44

## 2024-03-28 RX ADMIN — ACETYLCYSTEINE 2 ML: 100 SOLUTION ORAL; RESPIRATORY (INHALATION) at 15:38

## 2024-03-28 RX ADMIN — OXYMETAZOLINE HYDROCHLORIDE 2 SPRAY: 0.05 SPRAY NASAL at 19:45

## 2024-03-28 RX ADMIN — PREDNISONE 5 MG: 5 TABLET ORAL at 08:59

## 2024-03-28 RX ADMIN — CALCIUM CARBONATE 600 MG (1,500 MG)-VITAMIN D3 400 UNIT TABLET 1 TABLET: at 08:59

## 2024-03-28 RX ADMIN — SODIUM CHLORIDE 300 ML: 9 INJECTION, SOLUTION INTRAVENOUS at 11:39

## 2024-03-28 RX ADMIN — LEVALBUTEROL HYDROCHLORIDE 1.25 MG: 1.25 SOLUTION RESPIRATORY (INHALATION) at 15:38

## 2024-03-28 RX ADMIN — PIPERACILLIN AND TAZOBACTAM 2.25 G: 2; .25 INJECTION, POWDER, FOR SOLUTION INTRAVENOUS at 01:37

## 2024-03-28 RX ADMIN — AMIODARONE HYDROCHLORIDE 400 MG: 200 TABLET ORAL at 08:58

## 2024-03-28 RX ADMIN — AMIODARONE HYDROCHLORIDE 400 MG: 200 TABLET ORAL at 19:43

## 2024-03-28 ASSESSMENT — ACTIVITIES OF DAILY LIVING (ADL)
ADLS_ACUITY_SCORE: 42
ADLS_ACUITY_SCORE: 43
ADLS_ACUITY_SCORE: 43
ADLS_ACUITY_SCORE: 42
ADLS_ACUITY_SCORE: 42
ADLS_ACUITY_SCORE: 43
ADLS_ACUITY_SCORE: 42
ADLS_ACUITY_SCORE: 43
ADLS_ACUITY_SCORE: 43
ADLS_ACUITY_SCORE: 42
ADLS_ACUITY_SCORE: 43
ADLS_ACUITY_SCORE: 42
ADLS_ACUITY_SCORE: 42
ADLS_ACUITY_SCORE: 43
ADLS_ACUITY_SCORE: 42

## 2024-03-28 NOTE — PROGRESS NOTES
Date: 3/28/2024  Time: 3:15 PM     Data:  Pre Wt: 48.7 kg  Desired Wt:   To be established  Post Wt: 46.2 kg (estimated)  Weight change: -2.5 kg  Ultrafiltration - Post Run Net Total Removed (mL):  2500 ml  Vascular Access Status: Graft patent  Dialyzer Rinse:  Clear  Total Blood Volume Processed: 89.5 L   Total Dialysis (Treatment) Time: 3.5 Hrs  Dialysate Bath: K 4, Ca 3  Heparin: Heparin: None     Lab:   No  HbsAg - 3/18/2024 (Non-reactive)  HbsAb - 3/18/24  (Immune)      Interventions:  Dialysis done through left AV Graft using 15 gauge needles  UF set to 2.8 Liters, accommodating priming and rinse back volumes  ,   See administered per MAR  See Flowsheet for Crit Profile A throughout the run  Treatment has ended safely and  blood is rinsed back completely  Decannulation done post HD, hemostasis is achieved in 8  minutes  Pressure dressing is applied, to be removed after 4-6 hours  Report given to RIYA Clinton,left to her room in stable condition     Assessment:  A/O x 4, calm and cooperative, denies pain  Lung sounds clear anterior and lateral BUL,clear diminished BLL  Left arm AV Graft has good thrill and bruit                Plan:    Per Renal team

## 2024-03-28 NOTE — PLAN OF CARE
ICU End of Shift Summary. See flowsheets for vital signs and detailed assessment.    Changes this shift: VSS. Afebrile. Alert and oriented x4, writes notes appropriately. Remains on pressure support 10/5 30% overnight. PRN atarax and ativan given for anxiety, ineffective, precedex gtt restarted. Tolerating tube feeds at goal rate of 35ml/hr. Anuric. Rectal tube in place 100ml output this shift.     Plan: Continue pressure support. Wean precedex as tolerated.         Goal Outcome Evaluation:      Plan of Care Reviewed With: patient    Overall Patient Progress: no changeOverall Patient Progress: no change    Outcome Evaluation: remained on P/S overnight, some anxiety - precedex restarted

## 2024-03-28 NOTE — PLAN OF CARE
"Goal Outcome Evaluation:      Plan of Care Reviewed With: patient    Overall Patient Progress: no changeOverall Patient Progress: no change    Outcome Evaluation: see RD note 3/28    Christie Deras, SARAHN, LD  [facility]  Vocera - \" Clinical Dietitian\"  Weekend/Holiday RD - \"Weekend Clinical Dietitian\"    "

## 2024-03-28 NOTE — PROGRESS NOTES
MEDICAL ICU PROGRESS NOTE  03/28/2024      Date of Service (when I saw the patient): 03/28/2024    ASSESSMENT: Sofie Rodriguez is a 63 year old female with PMH COPD s/p bilateral lung transplant 6/28/22 c/b hemidiaphragm palsy and recurrent pneumonias, gastroparesis and small bowel dysmotility complicated by severe malnutrition now s/p PEG/J, ESRD on M/W/F HD, recent R femoral fx s/p ORIF, chronic diarrhea, recurrent c-diff, failure to thrive with inability to tolerate any tube feeds, who was admitted to [facility] on 2/10/24 for concerns over malnutrition and TPN initiation via portacath. Course complicated by recurrent and progressive acute on chronic hypoxic and hypercarbic respiratory failure requiring multiple ICU admissions and intubation 2/18-2/19, 2/28-2/29, 3/18-3/20, for continuous BiPAP. Again with worsening respiratory acidosis and hypercarbia, concern for infection vs acute rejection, requiring transfer back to ICU 3/20/2024 for intubation and bronchoscopy.    CHANGES and MAJOR THINGS TODAY:   - Increase ativan dose  - Clonidine at HS  - Changed P/S inspiratory pressure  - Care conference tomorrow  - Chest XR in AM    PLAN:     Neuro:  # Acute pain  # Sedation  Intubated and awake, alert. Denies new or acute pain. Uses lidocaine and heating pads as needed for pain management.   - Precedex to achieve sedation goal, wean as tolerates   - RASS goal 0 to -1  - Tylenol Q4 prn  - Lidocaine patch prn  - Heating pads prn    # Hx of Anxiety   # Claustrophobia  Reports claustrophobia contributing to limited compliance with BiPAP. Has seen health psych on 3/20, recommended grounding exercise (5-4-3-2-1) for use on BiPAP.   - Zyprexa 5mg at bedtime   - Atarax 0.5mg TID PRN for anxiety  - Ativan 0.25 mg PRN for anxiety  - Clonidine 0.1 mg at bedtime     # B/L Neuropathic Pain   New pain b/l this hospitalization. Last A1c <4.2 (7/11/23). Consider possibly 2/2 ESRD. TSH wnl. B12 and B1 elevated, B6 normal.  Copper low (56.3), zinc mildly low (48.3).  B3 in process.   - Consider gabapentin in the coming days   - Neuro Recs:  - No obvious clinical history or exam findings to suggest neurologic/neuromuscular causes of respiratory weakness  - Neuropathy labs currently pending  - Holding B12 supplement (supra-therapeutic 3/15)      Pulmonary:  # Acute on chronic hypoxic and hypercarbic respiratory failure  # Recurrent PNAs, concern for acute infection vs acute rejection  # S/p BSLT 6/8/22 for COPD  # R hemidiaphragm palsy   # Positive DSA   # Suspected CARLEE  # PTA nocturnal O2, 2L   S/p bilateral lung transplant 6/28/22 for COPD. Was admitted 2/10 to address malnutrition and admitted to ICU on 2/17 with hypoxic hypercarbic respiratory failure. Intubated on 2/18 due to worsening oxygen requirements, likely due to pulmonary edema given hx of ESRD requiring HD vs infection given hx of lung transplant, immunosuppressed status, and recurrent pneumonias. Required intubation 2/17 - 2/19. Ongoing respiratory acidosis despite BiPAP/AVAPS, claustrophobic while using to intermittent compliance.  Neurology consulted and MRI r/o central cause problem for respiratory drive. Started on AVAPS with improvement in mental status and VBG, and patient transferred back to medicine floor on 2/29. 3/08 SNIFF with no definite paradoxical movement of bilateral hemidiaphragm. Transferred back to ICU 3/18-3/20 d/t hypercarbia, severe respiratory acidosis, but did not require intubation during this time. Issues with anxiety/claustrophobia while using the mask, Atarax has helped though Zyprexa led to hallucinations. Patient's baseline appears to be with pH mid 7.2s and pCO2  mid 70-80s at best. Even with pH this low, and pCO2 that high, mentation remains stable. CT chest 3/24 w/ scattered opacity throughout all lungs; dilated pulm artery. On BiPAP 16/5 at time of transfer. Transplant pulm concerned for infectious vs acute rejection picture, recommending  intubation + bronchoscopy. Volume overload + pulmonary edema complicating picture, as patient has had low pressures limiting HD runs.   - Daily VBG  - Intubated on 03/24   - Transplant pulm following, appreciate recs               - 3/18 prospera in process  - Immunosuppression:   >Prednisone 5 mg every morning, 2.5 mg every afternoon  >Cyclosporine 200mg BID (Stopped tacrolimus 3/10)   >Overnight oximetry study suggestive of O2 vs CPAP need, as did require up to 2LPM overnight to prevent hypoxia  >Try to minimize O2 to preserve respiratory drive, will give IVIG for DSA+   >D/c'd theophylline 3/3/24 due to difficulty attaining therapeutic levels (started by ICU), could consider Modafinil   >Repeat metabolic CART study ordered by Transplant Pulm   - Airway clearance/nebs:   - Xopenex neb BID  - Hypertonic saline nebs q 3 hours PRN   - Volume removal with iHD               - Will need to place lines for CRRT if unable to run UF  - Sleep clinic eval at discharge for suspected CARLEE  - Limit medications that would depress respiration  - RR on ventilator changed to 12 on 03/27  - Pressure support inspiratory pressure changed to 8 from 10 cmH2O  - Chest XR scheduled for 03/29    Vent Mode: CPAP/PS  (Continuous positive airway pressure with Pressure Support)  FiO2 (%): 30 %  Resp Rate (Set): 16 breaths/min  Tidal Volume (Set, mL): 300 mL  PEEP (cm H2O): 5 cmH2O  Pressure Support (cm H2O): 10 cmH2O  Resp: 22    # Goals of Care  Care conference on 3/15 with Transplant Pulm, [facility], Medicine, daughters (Julia Nash) and Transplant SW. Overall medical updates provided and Qs answered. With declining respiratory acidosis on labs, discussed code status 3/18. Patient initially not desiring any escalation of her care to ICU/intubation with frustration re overall course. However, after discussing with her daughter on the phone she and family elected to remain full code and agreed to ICU admission and intubation if necessary.   -  Transplant Pulm requesting additional care conference in coming days, pending clinical course      Cardiovascular:  # A-flutter (recurrent on 3/17)  # A-fib, intermittent  # HTN  # HFpEF  Noted atrial fibrillation on arrival with HR elevated at 150, not sustained for > 10 minutes and otherwise hemodynamically stable. RVR resolved w/o medications.  PTA metoprolol (25mg BID) has been held through much of admission. On 3/17 new Aflutter developed overnight. HR 150s with flutter waves on EKG. Metoprolol administered along with Mg infusion with minimal response. Patient was then given amiodarone bolus and placed on drip which slightly lowered her HR in 120-130s. This was held for borderline hypotension 3/18, but restarted again with amiodarone 3/19, transition to current regimen on 3/21. 2/17 TTE: LVEF 55-60%, global RV function normal, no significant valvular abnormalities. Briefly required levophed and midodrine for HD but otherwise stable off pressors.   - MAP goal > 65 mmHg   - Metoprolol tartrate dose 25 mg BID (hold if MAP<65)  - Continue amiodarone 400mg BID PO, can decrease dose to qDay on 04/01  - Midodrine with HD  - Continuous telemetry      GI/Nutrition:  # Severe malnutrition   # Failure to thrive  # Hypoalbuminemia  # Gastroparesis, small bowel dysmotility  # S/P PEG/J with intolerance of enteral nutrition  #  Chronic osmotic diarrhea/SIBO s/p Rifaximin  # Recurrent C.Diff (3rd ep 3/12/24)    # GERD  Patient with gastroparesis (presumed due to vagal injury) and small bowel dysmotility complicated by unintentional 40lb weight loss over the past year and now severe malnutrition. Previously intolerant to oral food intake due to nausea. Was initially admitted for portacath and TPN initiation since 2/13. Intermittently tolerating feeds without n/v from 2/20.  Per GI, no ongoing concerns for SIBO as of 2/23. As of 3/11 transplant pulmonology is worried that TPN is not a realistic plan to continue at home and  would like to transition back to TF (RD oncerned pt is not absorbing any oral intake). TPN discontinued 3/16. Transplant pulm also worried about mucosal toxicity. Recurrent C.diff 3/12, Fidaxomicin x 10 days (3/13-3/22), with improvement in diarrhea. Transplant pulm requesting repeat colonoscopy w/ Bx after completion of c.diff treatment, to r/o toxicity or other reason that diarrhea is present.   - Nutrition consulted, appreciate assistance  - Continue TF at goal rate 35 ml/hr via PEG/J          - Consider reconsult GI week of 3/25/after metabolic CART study, for future colonoscopy +/- biopsy if diarrhea returns   - PPI BID  - Bowel regimen PRN  - Stool potassium and sodium sent for stool osmotic test to evaluate cause of diarrhea  - If concerned regarding osmotic diarrhea, please order stool sodium and stool potassium and utilize these values to calculate the stool osmotic gap using formula: 290 - 2 (Stool Sodium + Stool Potassium).  - An osmotic gap < 50 mOsm/kg is considered normal.  - An osmotic gap > 125 mOsm/kg is consistent with a pure osmotic diarrhea.   - Differential for osmotic diarrhea includes medications (antibiotics, lactulose, antacids, sorbitol, laxative abuse, magnesium ingestion), carbohydrate malabsorption/disaccharidase deficiencies (i.e., lactose intolerance), high ingestion of sugar substitutes or fructose.       Renal/Fluids/Electrolytes:  # ESRD on HD  # Hypervolemic hyponatremia, resolved  # Mild hyperphosphatemia  # Anion gap metabolic acidosis - resolved  Patient is ESRD on T/Th/Sat HD as outpt, now approx M/W/F inpatient. Hgb stabilizing. HD tolerating until 3/23. Briefly required extra Monday runs, not since being off TPN. She would prefer longer sessions to more days.  - Midodrine 10mg q8h with dialysis days   - Currently holding Sevelamer but may need to resume in the coming days per Nephrology   - CBC and CMP daily  - Strict I/Os  - Daily weight   - Renally adjust  medications      Endocrine:  # Hyperglycemia, stress induced  - Low dose sliding scale insulin  - Hypoglycemia protocol     # Hypothyroidism  Patient with new (24) low T4 at 0.64 and TSH at 6.5, concerning for new hypothyroidism vs sick thyroid syndrome. TSH with appropriate response, more consistent with elevation in the setting of acute illness. ICU team on  recommended initiation of treatment for possible hypothyroid as this can improve respiratory muscle function in the setting of weakness and malnutrition. 3/7, 3/15, 3/20 repeat thyroid function WNL.  - Continue liothyronine + levothyroxine -- note that prolonged combination therapy is not optimal & regimen should be re-evaluated (likely stop liothyronine, up-titrate levothyroxine) in post-acute setting      ID:  # Recurrent pneumonia, concern for acute infection vs rejection  # Recurrent C.Diff colitis (3rd ep 3/12/24)  # Hx EBV viremia   # Hypogammaglobulinemia  # Chronic immunosuppression / lung transplant  Empirically treated for pneumonia  - , RVP and cultures no growth to date. Recurrent C.Diff Positive 3/12, s/p PO Fidaxomicin 200 mg BID for 10 days (3/13-3/22) with improvement in diarrhea. Remains afebrile, leukocytosis resolved.  Ig, s/p IVIG.  EBV 27K (decreased compared to prior).     - Follow up BAL and blood cultures from 3/24  - Continue empiric antibiotic as below    Antibiotics:  Zosyn ( - , 3/24-current)  Bactrim (MWF, PJP ppx, previously on Dapsone)  Vancomycin ( - , 3/24-3/25)  Micafungin (- , 3/24 x1)  Fidaxomicin (3/13-3/22)      Hematology:    #Acute on chronic anemia / ESRD  Hgb stable, with no acute signs of bleeding. Acute drop  from 8.2 > 6.6 after two rechecks, no overt signs of bleeding; required 1U pRBC transfusion.    - Daily CBC  - Transfuse for Hgb < 7  - Continue Epogen with dialysis, held in setting of concern for acute infection   - Continue Venofer 50 mcg qweek  with dialysis, held in setting of concern for acute infection     #Steal physiology of LUE dialysis access fistula  LUE arterial US obtained 2/21 with concern for LUE edema. US of fistula demonstrated steal physiology. Discussed with nephrology, and vascular surgery consulted on 2/22. Vascular surgery has only minor concerns for steal symptoms and given that the AVF is working well, they are okay with outpatient fistulograms/ venoplasty with wrist brachial index and PPG's, unless new concerns arise.  - Plan for outpatient workup as above; nephrology in agreement with outpatient workup.      Musculoskeletal:  # Right hip fracture s/p ORIF (December 2023)   - PT/OT to eval and treat      Skin:  # Left upper extremity unilateral edema, improving   # Bilateral pedal and ankle edema, improving  Edema due to third spacing due to hypoalbuminemia vs HF. BNP >41790 at admission, Echo on 2/18 shows EF of 55-60% with collapsible IVC. Extremity edema 2/2 to hypoalbuminemia. Upper limb duplex USG on 2/18 negative for DVT.  USG left arm on 2/21 shows steal physiology and Vascular Surgery consulted (see Heme section). Overall edema in extremities have improved significantly with dialysis.    - Elevation and wrapping of only lower legs as needed and increased UF per nephrology for volume management; no wrapping of left upper extremity with dialysis fistula      General Cares/Prophylaxis:    DVT Prophylaxis: Apixaban  GI Prophylaxis: PPI  Restraints: N/A  Family Communication: Daughters Charity & Julia  Code Status: FULL      Lines/tubes/drains:  - PEG/J  - PICC line in RUE   - PIV x1  - Rectal tube      Disposition:  - Medical ICU      Patient seen and findings/plan discussed with medical ICU staff, Dr. Franks.    I spent a total of 35 minutes (excluding procedure time) personally providing and directing critical care services at the bedside and on the critical care unit for Sofie Rodriguez      Clinically Significant Risk Factors               # Hypoalbuminemia: Lowest albumin = 2.6 g/dL at 2/18/2024  5:13 AM, will monitor as appropriate  # Coagulation Defect: INR = 1.25 (Ref range: 0.85 - 1.15) and/or PTT = N/A, will monitor for bleeding            # Severe Malnutrition: based on nutrition assessment      # Financial/Environmental Concerns: none            ====================================  INTERVAL HISTORY:   No acute overnight events. Patient has been pressure supporting without issue since yesterday and had been off continuous precedex infusion until overnight where she experienced increased anxiety without relief from her PRN medications. PRN ativan dose increased and clonidine was scheduled for HS to start tonight. Care conference planned for tomorrow with the patient and her family to discuss goals of care.      OBJECTIVE:   1. VITAL SIGNS:   Temp:  [97.6  F (36.4  C)-99.4  F (37.4  C)] 98.3  F (36.8  C)  Pulse:  [56-74] 66  Resp:  [16-30] 22  BP: (112-157)/(46-75) 156/59  FiO2 (%):  [30 %] 30 %  SpO2:  [91 %-100 %] 100 %  Vent Mode: CPAP/PS  (Continuous positive airway pressure with Pressure Support)  FiO2 (%): 30 %  Resp Rate (Set): 16 breaths/min  Tidal Volume (Set, mL): 300 mL  PEEP (cm H2O): 5 cmH2O  Pressure Support (cm H2O): 10 cmH2O  Resp: 22  PIP 25    2. INTAKE/ OUTPUT:   I/O last 3 completed shifts:  In: 1618.6 [I.V.:423.6; NG/GT:460]  Out: 2900 [Other:2500; Stool:400]  Net -2106 past 24h    3. PHYSICAL EXAMINATION:  General: awake, alert & oriented, resting in bed, communicating via writing  HEENT: Mucous membranes moist  Neuro: Awake and alert, no focal deficits, moving all extremities to command and spontaneously  Pulm/Resp: Coarse breath sounds bilaterally, diminished on R>L, no accessory muscle use  CV: RRR, S1/S2 without m/r/g; pedal pulses 2+ without LE edema  Abdomen: Soft, non-distended, non-tender  : rectal tube in place  Incisions/Skin: PICC and PEG site without surrounding erythema, no rashes noted    4. LABS:    Venous Blood Gas  Recent Labs   Lab 03/28/24 0451 03/27/24  1737 03/27/24  1553 03/27/24 0328   PHV 7.35 7.38 7.37 7.44*   PCO2V 55* 52* 54* 48   PO2V 46 40 42 37   HCO3V 30* 31* 31* 32*   LINDY 3.8* 4.7* 5.0* 6.9*   O2PER 30 30 30 30     Complete Blood Count   Recent Labs   Lab 03/28/24 0451 03/27/24 0328 03/26/24 0313 03/25/24 0426   WBC 4.7 5.5 5.2 5.8   HGB 8.1* 8.9* 8.2* 8.0*    191 188 179     Basic Metabolic Panel  Recent Labs   Lab 03/28/24  0857 03/28/24 0455 03/28/24 0451 03/28/24  0009 03/27/24 0827 03/27/24 0328 03/26/24 0415 03/26/24 0313 03/25/24 0823 03/25/24 0426   NA  --   --  141  --   --  138  --  138  --  138   POTASSIUM  --   --  3.4  --   --  3.7  --  3.9  --  3.5   CHLORIDE  --   --  101  --   --  99  --  98  --  99   CO2  --   --  27  --   --  29  --  26  --  27   BUN  --   --  49.2*  --   --  29.2*  --  51.1*  --  34.5*   CR  --   --  3.22*  --   --  1.97*  --  2.84*  --  2.00*   * 105* 110* 122*   < > 120*   < > 149*   < > 116*    < > = values in this interval not displayed.     Liver Function Tests  Recent Labs   Lab 03/28/24 0451 03/27/24 0328 03/26/24 0313 03/25/24 0426   AST 16 17 18 16   ALT 12 13 11 14   ALKPHOS 85 93 91 93   BILITOTAL 0.3 0.4 0.5 0.6   ALBUMIN 3.2* 3.1* 3.2* 3.1*   INR 1.25* 1.24* 1.42* 1.50*     Coagulation Profile  Recent Labs   Lab 03/28/24  0451 03/27/24  0328 03/26/24  0313 03/25/24  0426   INR 1.25* 1.24* 1.42* 1.50*     Micro:   - BAL 3/24:   - Cell count w diff: PMN 75%, lymphocytes 5, monocytes 19, eos 1  - No organisms seen on Gram stain  - RVP, HSV, Histoplasma neg  - Fungal & bacterial cultures NGTD  - EBV, CMV, PJP, Aspergillus, Legionella, Mycoplasma, AFB in process  - Bcx 3/24 NGTD    5. RADIOLOGY:   No results found for this or any previous visit (from the past 24 hour(s)).

## 2024-03-28 NOTE — PROGRESS NOTES
Care Management Follow Up    Length of Stay (days): 47    Expected Discharge Date:  TBD      Concerns to be Addressed: Scheduling Care Conference    Additional Information:    Scheduling Care Conference    Time & Location:   Dr. Franks asked for Care Conference for 3/29/24 after 12 pm with Palliative, and Transplant MSW and family.   Spoke with Charity, daughter [phone], who is out of town. She will confirm with her family, if they will be available for Care Conference at 1230 pm and will call this writer.   Providers:   MICU 1- available after 12 pm. Will need to confirm specific time   Palliative: Will need to confirm specific time   Transplant MSW- Nicolle Nick notified about availability. Will need to confirm specific time     Family:   Daughter Charity [phone]    Call into conference information:   [phone]   Meeting # [id number]  PIN # [phone]      Addendum 1309: Per Call from Charity, dre, [phone], Family will be available via phone for the Care Conference on Friday 3/29/24 at 1230 pm. Provided Dial into conference information.   Providers meeting will be held at 12:15 at  conference room. Updated Dr. Franks, MICU1, Nicolle MSW, Palliative, and primary RN.   Dr. Vásquez asked to hold meeting in [place]'s room. Will need I-pad for the conference.   Dr. Pink, Palliative - confirmed availability.     Angela Johnson, MSN, MA, CCM, RN  4C/4A/4E ICU Care Coordinator  Phone:[phone]  Pager: [phone]    For Weekend & Holiday on call RN Care Coordinator:  [place] [facility] ([phone]) Saturday & Sunday; ([phone])  Recognized Holidays   Weekend 4C/4A/4E ICUs /6A Care Coordinator  Pager: [phone]

## 2024-03-28 NOTE — PROGRESS NOTES
Nephrology Progress Note  03/28/2024         Assessment & Recommendations:   Sofie Rodriguez is a 63 year old year old female with ESKD, COPD s/p b/l lung transplant in 6/2022 with multiple complications, gastroparesis s/p GJ tube, GIB, chronic diarrhea, recurrent c-diff, FTT with inability to tolerate any tube feeds, R hip fx s/p ORIF 12/2023, admitted on 2/10 for initiation of TPN/lipids, transferred to ICU on 2/17 with worsening mental status and respiratory acidosis in spite of bipap, ultimately intubated on 2/18, being treated for PNA, also with volume overload in setting of high volume TPN. Persistent respiratory acidosis in spite of volume off, transferred to ICU for hypotension and respiratory failure, improved on bipap, now floor status again 3/1. Transferred to ICU 3/18 again with worsening hypercapnic respiratory failure. Transferred to floor 3/20, back to ICU 3/24    # ESKD - TTS, LUE AVG, 3hr, 45.5 kg, [facility], Dr. Andres Fairbanks.  - daily HD this week for volume off, HD TTS, UF runs on off days   - Requires EMLA cream an hour before HD  - please avoid PICC which will compromise future dialysis accesses; a midline is much preferred for this patient    # Dialysis access: LUE AVG placed 3-4 months ago, per pt. She had arm swelling and a fistulogram a couple months ago and swelling improved but now has redeveloped.  - US with c/f possible steal syndrome  - per vascular surgery, no concern for steal syndrome, ok to go ahead with fistulogram  - given that AVF is working well on dialysis (450 BFR) and at this time IR is only able to perform fistulograms when AVF isn't working well, will defer to OP for management.     # Persistent respiratory acidosis: when off vent  - difficulty tolerating bipap due to claustrophobia, but wearing this lately  - balancing act between giving bicarb to maintain pH in setting of severe persistent respiratory acidosis with bicarb quickly converting CO2 and worsening  "overall status  - recommend stopping PO bicarb, ok to restart for pH < 7.1 but would stop again once pH is 7.2; will continue to provide bicarb with dialysis  - transplant pulm following  - re-intubated 3/24 for bronch/BAL (NGTD)    # Aflutter: on amio and BB  # Volume/BP: EDW 45.5 kg. Anuric; on metoprolol soln 25 mg bid   - weights are variable, unsure of accuracy; appears close to euvolemia with much improvement in LE edema  - CXR 3/25 with likely pulm edema  - 2.5L UF yesterday, net neg 1.3L  - UF goal 4L today, hoping to be net neg 2 to 2.5L today  - pre HD wt 48.7 kg, goal wt 45.5 kg     # Nutrition: on [employer] tube feeds     # Anemia 2/2 ESKD  On Venofer 50 qwk, Mircera last dose 1/9/2024  - hgb downdrifting, 8's  - Will continue Venofer 50 mcg q week (Tuesday)  - continue epogen 8000 units via HD    # BMD:   - Ca 8-9's, phos 5.7, alb 3.2  - sevelamer on hold         Recommendations were communicated to primary team via note     RABIA Moctezuma   Division of Renal Disease and Hypertension  [phone]    Interval History :   Seen before dialysis, 2.5L off yesterday, net neg 1.3L. Goal 4kg UF today, hoping to be net neg 2 to 2.5 kg. Intubated, on pressure support trial. Nodding yes/no to questions and writing on note pad.  No n/v, CP, chills.      Review of Systems:   4 point ROS neg other than as noted above    Physical Exam:   I/O last 3 completed shifts:  In: 1618.6 [I.V.:423.6; NG/GT:460]  Out: 2900 [Other:2500; Stool:400]   BP (!) 152/58   Pulse 67   Temp 98.3  F (36.8  C) (Axillary)   Resp 22   Ht 1.57 m (5' 1.81\")   Wt 48.7 kg (107 lb 5.8 oz)   SpO2 100%   BMI 19.76 kg/m       GENERAL APPEARANCE: on vent, alert and awake  PULM: diminished, on vent  CV: RRR    trace no peripheral  GI: soft   INTEGUMENT: no cyanosis, no rashes on exposed skin  NEURO: a/o3  Access Left AVG     Labs:   All labs reviewed by me  Electrolytes/Renal -   Recent Labs   Lab Test 03/28/24  0857 03/28/24  0455 " 03/28/24 0451 03/27/24 0827 03/27/24 0328 03/26/24 0415 03/26/24 0313   NA  --   --  141  --  138  --  138   POTASSIUM  --   --  3.4  --  3.7  --  3.9   CHLORIDE  --   --  101  --  99  --  98   CO2  --   --  27  --  29  --  26   BUN  --   --  49.2*  --  29.2*  --  51.1*   CR  --   --  3.22*  --  1.97*  --  2.84*   * 105* 110*   < > 120*   < > 149*   ESTUARDO  --   --  8.9  --  8.8  --  9.2   MAG  --   --  2.0  --  1.8  --  2.2   PHOS  --   --  5.7*  --  3.9  --  5.3*    < > = values in this interval not displayed.       CBC -   Recent Labs   Lab Test 03/28/24 0451 03/27/24 0328 03/26/24 0313   WBC 4.7 5.5 5.2   HGB 8.1* 8.9* 8.2*    191 188       LFTs -   Recent Labs   Lab Test 03/28/24 0451 03/27/24 0328 03/26/24 0313   ALKPHOS 85 93 91   BILITOTAL 0.3 0.4 0.5   ALT 12 13 11   AST 16 17 18   PROTTOTAL 5.2* 5.2* 5.2*   ALBUMIN 3.2* 3.1* 3.2*       Iron Panel -   Recent Labs   Lab Test 09/26/22  0555 09/03/22  1039 08/24/22  0810   IRON 54 21* 41   IRONSAT 22 9* 21   CARLOS 769* 343* 334*         Imaging:  All imaging studies reviewed by me.     Current Medications:   acetylcysteine  2 mL Nebulization 4x daily    amiodarone  400 mg Oral BID    apixaban ANTICOAGULANT  2.5 mg Oral BID    calcium carbonate-vitamin D  1 tablet Per J Tube TID w/meals    chlorhexidine  15 mL Mouth/Throat Q12H    cloNIDine  0.1 mg Oral At Bedtime    [Held by provider] cyanocobalamin  500 mcg Per Feeding Tube Daily    cycloSPORINE modified  175 mg Per G Tube QAM    cycloSPORINE modified  175 mg Per G Tube QPM    epoetin tre-epbx  8,000 Units Intravenous Once in dialysis/CRRT    heparin lock flush  5-20 mL Intracatheter Q24H    insulin aspart  1-4 Units Subcutaneous Q4H    levalbuterol  1.25 mg Nebulization 4x Daily    levothyroxine  25 mcg Oral QAM AC    lidocaine  1 patch Transdermal Q24h    liothyronine  2.5 mcg Oral BID    metoprolol tartrate  25 mg Per J Tube BID    midodrine  10 mg Oral 3 times per day on Tuesday  Thursday Saturday    - MEDICATION INSTRUCTIONS -   Does not apply Once    OLANZapine zydis  5 mg Oral At Bedtime    oxymetazoline  2 spray Both Nostrils BID    pantoprazole  40 mg Per J Tube BID    piperacillin-tazobactam  2.25 g Intravenous Q6H    predniSONE  5 mg Per J Tube QAM    And    predniSONE  2.5 mg Per J Tube QPM    [Held by provider] sevelamer carbonate (RENVELA)  0.8 g Oral BID    [Held by provider] sodium bicarbonate  1,300 mg Oral or Feeding Tube TID    sodium chloride (PF)  10 mL Intracatheter Q8H    sodium chloride (PF)  10-40 mL Intracatheter Q8H    sulfamethoxazole-trimethoprim  1 tablet Oral Q Mon Wed Fri AM      dexmedeTOMIDine Stopped (03/28/24 0851)    dextrose      fentaNYL Stopped (03/25/24 1306)    - MEDICATION INSTRUCTIONS -      norepinephrine Stopped (03/25/24 0200)    - MEDICATION INSTRUCTIONS -      - MEDICATION INSTRUCTIONS -      sodium chloride 0.9%      - MEDICATION INSTRUCTIONS -       RABIA Moctezuma

## 2024-03-28 NOTE — PROGRESS NOTES
CLINICAL NUTRITION SERVICES - REASSESSMENT NOTE     Nutrition Prescription    RECOMMENDATIONS FOR MDs/PROVIDERS TO ORDER:  None currently     Malnutrition Status:    Severe malnutrition in the context of chronic illness/disease    Recommendations already ordered by Registered Dietitian (RD):  Continue current TF regimen as ordered    Future/Additional Recommendations:  Monitor results of stool testing to determine osmotic vs other etiology  Pending oxygen needs, consider increasing TF volume as tolerated to promote weight gain if patient continues to be NPO     EVALUATION OF THE PROGRESS TOWARD GOALS   Diet: NPO + TF    Nutrition Support: access: GJ tube    Formula/schedule/modulars: 3/14-___: KF Renal Support @ 35 mL/hr x 22 hours/day (held for 1 hour before/after Synthroid) = 770 mL formula, 1386 Kcals, 62 gm pro, 131 gm CHO, 15 gm fiber, 516 mL free water daily     Free water flushes: 30 mL every 4 hours    Intake/Tolerance: Tube Feedin day average tube feeding infusion of 650 mL (84 % of goal volume) = average intake of 1170 kcal/day and 52 g protein/day.     NEW FINDINGS   Pt reports no nutrition questions/concerns for writer today.     GI:  Last BM: 24  rectal tube output: 500 mL 3/27, 450 mL 3/26, 325 mL 3/25    Weight:  Most Recent Weight: 48.7 kg (107 lb 5.8 oz)  on 3/28/24 via Bed scale  Body mass index is 19.76 kg/m .  Wt fluctuates but stable x 1 month. Up 5.3 kg from admission.       Meds:  Calcium carbonate vitamin D TID; vitamin B12 (held)  SSI Q4H  Levothyroxine   Zosyn   Prednisone   Renvela - held   Bactrim     Labs:   24 04:26 24 03:13 24 03:28 24 04:51   Sodium 138 138 138 141   Potassium 3.5 3.9 3.7 3.4   Chloride 99 98 99 101   Carbon Dioxide (CO2)  29    Urea Nitrogen 34.5 (H) 51.1 (H) 29.2 (H) 49.2 (H)   Creatinine 2.00 (H) 2.84 (H) 1.97 (H) 3.22 (H)   GFR Estimate 28 (L) 18 (L) 28 (L) 16 (L)   Calcium 8.5 (L) 9.2 8.8 8.9   Anion Gap 12 14 10 13  "  Magnesium 1.6 (L) 2.2 1.8 2.0   Phosphorus 2.2 (L) 5.3 (H) 3.9 5.7 (H)   Albumin 3.1 (L) 3.2 (L) 3.1 (L) 3.2 (L)   Protein Total 4.9 (L) 5.2 (L) 5.2 (L) 5.2 (L)   Alkaline Phosphatase 93 91 93 85   ALT 14 11 13 12   AST 16 18 17 16   Bilirubin Total 0.6 0.5 0.4 0.3   Glucose 116 (H) 149 (H) 120 (H) 110 (H)       Respiratory:   Intubated; mechanical vent    MALNUTRITION  % Intake: Decreased intake does not meet criteria  % Weight Loss: Weight loss does not meet criteria for malnutrition   Subcutaneous Fat Loss: Global/full body: severe   Muscle Loss: Global/full body: severe  Fluid Accumulation/Edema: Does not meet criteria  Malnutrition Diagnosis: Severe malnutrition in the context of chronic illness/disease    Previous Goals   Total avg nutritional intake (TF + PO) to meet a minimum of 35 kcal/kg and 1.2 g PRO/kg daily (per dosing wt 46 kg), with goal of weight restoration to 55kg.  Evaluation: Not met    Previous Nutrition Diagnosis  Malnutrition related to history of EN intolerance as evidenced by prior severe weight loss with severe fat and muscle wasting    Evaluation: No change    CURRENT NUTRITION DIAGNOSIS  Malnutrition related to history of EN intolerance as evidenced by prior severe weight loss with severe fat and muscle wasting      INTERVENTIONS  Implementation  Enteral Nutrition - continue as ordered      Goals  Total avg nutritional intake to meet a minimum of 30 kcal/kg and 1.3 g PRO/kg daily (per dosing wt 46 kg).    Monitoring/Evaluation  Progress toward goals will be monitored and evaluated per protocol.      Christie Deras, SARAHN, LD  4C MICU RD  Vocera - \"4C Clinical Dietitian\"  Weekend/Holiday RD - \"Weekend Clinical Dietitian\"  "

## 2024-03-29 ENCOUNTER — APPOINTMENT (OUTPATIENT)
Dept: GENERAL RADIOLOGY | Facility: CLINIC | Age: 64
DRG: 640 | End: 2024-03-29
Payer: MEDICARE

## 2024-03-29 ENCOUNTER — APPOINTMENT (OUTPATIENT)
Dept: OCCUPATIONAL THERAPY | Facility: CLINIC | Age: 64
DRG: 640 | End: 2024-03-29
Attending: INTERNAL MEDICINE
Payer: MEDICARE

## 2024-03-29 ENCOUNTER — APPOINTMENT (OUTPATIENT)
Dept: PHYSICAL THERAPY | Facility: CLINIC | Age: 64
DRG: 640 | End: 2024-03-29
Attending: INTERNAL MEDICINE
Payer: MEDICARE

## 2024-03-29 LAB
ALBUMIN SERPL BCG-MCNC: 3.4 G/DL (ref 3.5–5.2)
ALP SERPL-CCNC: 94 U/L (ref 40–150)
ALT SERPL W P-5'-P-CCNC: 13 U/L (ref 0–50)
ANION GAP SERPL CALCULATED.3IONS-SCNC: 12 MMOL/L (ref 7–15)
AST SERPL W P-5'-P-CCNC: 21 U/L (ref 0–45)
BACTERIA BLD CULT: NO GROWTH
BACTERIA BLD CULT: NO GROWTH
BASE EXCESS BLDV CALC-SCNC: 3.7 MMOL/L (ref -3–3)
BASE EXCESS BLDV CALC-SCNC: 5.6 MMOL/L (ref -3–3)
BASOPHILS # BLD AUTO: ABNORMAL 10*3/UL
BASOPHILS # BLD MANUAL: 0.1 10E3/UL (ref 0–0.2)
BASOPHILS NFR BLD AUTO: ABNORMAL %
BASOPHILS NFR BLD MANUAL: 2 %
BILIRUB SERPL-MCNC: 0.3 MG/DL
BUN SERPL-MCNC: 25.8 MG/DL (ref 8–23)
CALCIUM SERPL-MCNC: 9.4 MG/DL (ref 8.8–10.2)
CHLORIDE SERPL-SCNC: 101 MMOL/L (ref 98–107)
CREAT SERPL-MCNC: 2.16 MG/DL (ref 0.51–0.95)
DACRYOCYTES BLD QL SMEAR: SLIGHT
DEPRECATED HCO3 PLAS-SCNC: 28 MMOL/L (ref 22–29)
DONOR IDENTIFICATION: NORMAL
DQB2: 4960
DSA COMMENTS: NORMAL
DSA PRESENT: YES
DSA TEST METHOD: NORMAL
EGFRCR SERPLBLD CKD-EPI 2021: 25 ML/MIN/1.73M2
EOSINOPHIL # BLD AUTO: ABNORMAL 10*3/UL
EOSINOPHIL # BLD MANUAL: 0.5 10E3/UL (ref 0–0.7)
EOSINOPHIL NFR BLD AUTO: ABNORMAL %
EOSINOPHIL NFR BLD MANUAL: 10 %
ERYTHROCYTE [DISTWIDTH] IN BLOOD BY AUTOMATED COUNT: 17 % (ref 10–15)
GLUCOSE BLDC GLUCOMTR-MCNC: 101 MG/DL (ref 70–99)
GLUCOSE BLDC GLUCOMTR-MCNC: 117 MG/DL (ref 70–99)
GLUCOSE BLDC GLUCOMTR-MCNC: 118 MG/DL (ref 70–99)
GLUCOSE BLDC GLUCOMTR-MCNC: 120 MG/DL (ref 70–99)
GLUCOSE BLDC GLUCOMTR-MCNC: 122 MG/DL (ref 70–99)
GLUCOSE BLDC GLUCOMTR-MCNC: 125 MG/DL (ref 70–99)
GLUCOSE BLDC GLUCOMTR-MCNC: 133 MG/DL (ref 70–99)
GLUCOSE SERPL-MCNC: 121 MG/DL (ref 70–99)
HCO3 BLDV-SCNC: 31 MMOL/L (ref 21–28)
HCO3 BLDV-SCNC: 32 MMOL/L (ref 21–28)
HCT VFR BLD AUTO: 29.8 % (ref 35–47)
HGB BLD-MCNC: 8.9 G/DL (ref 11.7–15.7)
IMM GRANULOCYTES # BLD: ABNORMAL 10*3/UL
IMM GRANULOCYTES NFR BLD: ABNORMAL %
INR PPP: 1.18 (ref 0.85–1.15)
LYMPHOCYTES # BLD AUTO: ABNORMAL 10*3/UL
LYMPHOCYTES # BLD MANUAL: 1 10E3/UL (ref 0.8–5.3)
LYMPHOCYTES NFR BLD AUTO: ABNORMAL %
LYMPHOCYTES NFR BLD MANUAL: 19 %
MAGNESIUM SERPL-MCNC: 1.8 MG/DL (ref 1.7–2.3)
MCH RBC QN AUTO: 34.2 PG (ref 26.5–33)
MCHC RBC AUTO-ENTMCNC: 29.9 G/DL (ref 31.5–36.5)
MCV RBC AUTO: 115 FL (ref 78–100)
METAMYELOCYTES # BLD MANUAL: 0.2 10E3/UL
METAMYELOCYTES NFR BLD MANUAL: 3 %
MONOCYTES # BLD AUTO: ABNORMAL 10*3/UL
MONOCYTES # BLD MANUAL: 0.5 10E3/UL (ref 0–1.3)
MONOCYTES NFR BLD AUTO: ABNORMAL %
MONOCYTES NFR BLD MANUAL: 10 %
NEUTROPHILS # BLD AUTO: ABNORMAL 10*3/UL
NEUTROPHILS # BLD MANUAL: 2.8 10E3/UL (ref 1.6–8.3)
NEUTROPHILS NFR BLD AUTO: ABNORMAL %
NEUTROPHILS NFR BLD MANUAL: 56 %
NRBC # BLD AUTO: 0 10E3/UL
NRBC BLD AUTO-RTO: 0 /100
O2/TOTAL GAS SETTING VFR VENT: 30 %
O2/TOTAL GAS SETTING VFR VENT: 30 %
ORGAN: NORMAL
OXYHGB MFR BLDV: 73 % (ref 70–75)
OXYHGB MFR BLDV: 74 % (ref 70–75)
PCO2 BLDV: 59 MM HG (ref 40–50)
PCO2 BLDV: 59 MM HG (ref 40–50)
PH BLDV: 7.32 [PH] (ref 7.32–7.43)
PH BLDV: 7.35 [PH] (ref 7.32–7.43)
PHOSPHATE SERPL-MCNC: 4.1 MG/DL (ref 2.5–4.5)
PLAT MORPH BLD: ABNORMAL
PLATELET # BLD AUTO: 176 10E3/UL (ref 150–450)
PO2 BLDV: 43 MM HG (ref 25–47)
PO2 BLDV: 44 MM HG (ref 25–47)
POTASSIUM SERPL-SCNC: 3.5 MMOL/L (ref 3.4–5.3)
PROT SERPL-MCNC: 5.7 G/DL (ref 6.4–8.3)
RBC # BLD AUTO: 2.6 10E6/UL (ref 3.8–5.2)
RBC MORPH BLD: ABNORMAL
SA 1 CELL: NORMAL
SA 1 TEST METHOD: NORMAL
SA 2 CELL: NORMAL
SA 2 TEST METHOD: NORMAL
SA1 HI RISK ABY: NORMAL
SA1 MOD RISK ABY: NORMAL
SA2 HI RISK ABY: NORMAL
SA2 MOD RISK ABY: NORMAL
SAO2 % BLDV: 75 % (ref 70–75)
SAO2 % BLDV: 75.5 % (ref 70–75)
SCANNED LAB RESULT: NORMAL
SODIUM SERPL-SCNC: 141 MMOL/L (ref 135–145)
UNACCEPTABLE ANTIGENS: NORMAL
UNOS CPRA: 37
VARIANT LYMPHS BLD QL SMEAR: PRESENT
WBC # BLD AUTO: 5 10E3/UL (ref 4–11)
ZZZSA 1  COMMENTS: NORMAL
ZZZSA 2 COMMENTS: NORMAL

## 2024-03-29 PROCEDURE — 250N000011 HC RX IP 250 OP 636

## 2024-03-29 PROCEDURE — 250N000012 HC RX MED GY IP 250 OP 636 PS 637: Performed by: NURSE PRACTITIONER

## 2024-03-29 PROCEDURE — 85014 HEMATOCRIT: CPT

## 2024-03-29 PROCEDURE — 250N000013 HC RX MED GY IP 250 OP 250 PS 637

## 2024-03-29 PROCEDURE — 94640 AIRWAY INHALATION TREATMENT: CPT | Mod: 76

## 2024-03-29 PROCEDURE — 97535 SELF CARE MNGMENT TRAINING: CPT | Mod: GO

## 2024-03-29 PROCEDURE — 250N000009 HC RX 250

## 2024-03-29 PROCEDURE — 99233 SBSQ HOSP IP/OBS HIGH 50: CPT | Performed by: PHYSICIAN ASSISTANT

## 2024-03-29 PROCEDURE — 85610 PROTHROMBIN TIME: CPT | Performed by: STUDENT IN AN ORGANIZED HEALTH CARE EDUCATION/TRAINING PROGRAM

## 2024-03-29 PROCEDURE — 250N000013 HC RX MED GY IP 250 OP 250 PS 637: Performed by: STUDENT IN AN ORGANIZED HEALTH CARE EDUCATION/TRAINING PROGRAM

## 2024-03-29 PROCEDURE — 99207 PR NO BILLABLE SERVICE THIS VISIT: CPT

## 2024-03-29 PROCEDURE — 999N000157 HC STATISTIC RCP TIME EA 10 MIN

## 2024-03-29 PROCEDURE — 94003 VENT MGMT INPAT SUBQ DAY: CPT

## 2024-03-29 PROCEDURE — 200N000002 HC R&B ICU UMMC

## 2024-03-29 PROCEDURE — 84302 ASSAY OF SWEAT SODIUM: CPT

## 2024-03-29 PROCEDURE — 97110 THERAPEUTIC EXERCISES: CPT | Mod: GP | Performed by: PHYSICAL THERAPIST

## 2024-03-29 PROCEDURE — 80053 COMPREHEN METABOLIC PANEL: CPT

## 2024-03-29 PROCEDURE — 99292 CRITICAL CARE ADDL 30 MIN: CPT | Mod: FS

## 2024-03-29 PROCEDURE — 85007 BL SMEAR W/DIFF WBC COUNT: CPT

## 2024-03-29 PROCEDURE — 94799 UNLISTED PULMONARY SVC/PX: CPT

## 2024-03-29 PROCEDURE — 71045 X-RAY EXAM CHEST 1 VIEW: CPT

## 2024-03-29 PROCEDURE — 99291 CRITICAL CARE FIRST HOUR: CPT | Mod: FS

## 2024-03-29 PROCEDURE — 83735 ASSAY OF MAGNESIUM: CPT | Performed by: STUDENT IN AN ORGANIZED HEALTH CARE EDUCATION/TRAINING PROGRAM

## 2024-03-29 PROCEDURE — 71045 X-RAY EXAM CHEST 1 VIEW: CPT | Mod: 26 | Performed by: RADIOLOGY

## 2024-03-29 PROCEDURE — 99233 SBSQ HOSP IP/OBS HIGH 50: CPT | Performed by: STUDENT IN AN ORGANIZED HEALTH CARE EDUCATION/TRAINING PROGRAM

## 2024-03-29 PROCEDURE — 250N000013 HC RX MED GY IP 250 OP 250 PS 637: Performed by: INTERNAL MEDICINE

## 2024-03-29 PROCEDURE — 84133 ASSAY OF URINE POTASSIUM: CPT

## 2024-03-29 PROCEDURE — 82805 BLOOD GASES W/O2 SATURATION: CPT

## 2024-03-29 PROCEDURE — 94640 AIRWAY INHALATION TREATMENT: CPT

## 2024-03-29 PROCEDURE — 97168 OT RE-EVAL EST PLAN CARE: CPT | Mod: GO

## 2024-03-29 PROCEDURE — 250N000012 HC RX MED GY IP 250 OP 636 PS 637

## 2024-03-29 PROCEDURE — 250N000012 HC RX MED GY IP 250 OP 636 PS 637: Performed by: INTERNAL MEDICINE

## 2024-03-29 PROCEDURE — 83935 ASSAY OF URINE OSMOLALITY: CPT

## 2024-03-29 PROCEDURE — 99418 PROLNG IP/OBS E/M EA 15 MIN: CPT | Performed by: STUDENT IN AN ORGANIZED HEALTH CARE EDUCATION/TRAINING PROGRAM

## 2024-03-29 PROCEDURE — 84100 ASSAY OF PHOSPHORUS: CPT | Performed by: STUDENT IN AN ORGANIZED HEALTH CARE EDUCATION/TRAINING PROGRAM

## 2024-03-29 RX ORDER — PIPERACILLIN SODIUM, TAZOBACTAM SODIUM 2; .25 G/10ML; G/10ML
2.25 INJECTION, POWDER, LYOPHILIZED, FOR SOLUTION INTRAVENOUS EVERY 6 HOURS
Status: COMPLETED | OUTPATIENT
Start: 2024-03-29 | End: 2024-04-02

## 2024-03-29 RX ADMIN — ACETYLCYSTEINE 2 ML: 100 SOLUTION ORAL; RESPIRATORY (INHALATION) at 15:44

## 2024-03-29 RX ADMIN — AMIODARONE HYDROCHLORIDE 400 MG: 200 TABLET ORAL at 08:47

## 2024-03-29 RX ADMIN — CLONIDINE HYDROCHLORIDE 0.1 MG: 0.1 TABLET ORAL at 21:13

## 2024-03-29 RX ADMIN — AMIODARONE HYDROCHLORIDE 400 MG: 200 TABLET ORAL at 19:55

## 2024-03-29 RX ADMIN — PREDNISONE 2.5 MG: 2.5 TABLET ORAL at 19:55

## 2024-03-29 RX ADMIN — PIPERACILLIN AND TAZOBACTAM 2.25 G: 2; .25 INJECTION, POWDER, FOR SOLUTION INTRAVENOUS at 01:58

## 2024-03-29 RX ADMIN — PIPERACILLIN SODIUM AND TAZOBACTAM SODIUM 2.25 G: 2; .25 INJECTION, POWDER, LYOPHILIZED, FOR SOLUTION INTRAVENOUS at 14:00

## 2024-03-29 RX ADMIN — Medication 2.5 MCG: at 08:50

## 2024-03-29 RX ADMIN — LEVOTHYROXINE SODIUM 25 MCG: 0.03 TABLET ORAL at 05:44

## 2024-03-29 RX ADMIN — METOPROLOL TARTRATE 25 MG: 25 TABLET, FILM COATED ORAL at 19:54

## 2024-03-29 RX ADMIN — LEVALBUTEROL HYDROCHLORIDE 1.25 MG: 1.25 SOLUTION RESPIRATORY (INHALATION) at 11:29

## 2024-03-29 RX ADMIN — OXYMETAZOLINE HYDROCHLORIDE 2 SPRAY: 0.05 SPRAY NASAL at 09:06

## 2024-03-29 RX ADMIN — SULFAMETHOXAZOLE AND TRIMETHOPRIM 1 TABLET: 400; 80 TABLET ORAL at 19:55

## 2024-03-29 RX ADMIN — CALCIUM CARBONATE 600 MG (1,500 MG)-VITAMIN D3 400 UNIT TABLET 1 TABLET: at 14:00

## 2024-03-29 RX ADMIN — PREDNISONE 5 MG: 5 TABLET ORAL at 08:47

## 2024-03-29 RX ADMIN — APIXABAN 2.5 MG: 2.5 TABLET, FILM COATED ORAL at 19:55

## 2024-03-29 RX ADMIN — CYCLOSPORINE 175 MG: 100 SOLUTION ORAL at 18:30

## 2024-03-29 RX ADMIN — LEVALBUTEROL HYDROCHLORIDE 1.25 MG: 1.25 SOLUTION RESPIRATORY (INHALATION) at 19:26

## 2024-03-29 RX ADMIN — METOPROLOL TARTRATE 25 MG: 25 TABLET, FILM COATED ORAL at 08:50

## 2024-03-29 RX ADMIN — PIPERACILLIN SODIUM AND TAZOBACTAM SODIUM 2.25 G: 2; .25 INJECTION, POWDER, LYOPHILIZED, FOR SOLUTION INTRAVENOUS at 20:06

## 2024-03-29 RX ADMIN — CALCIUM CARBONATE 600 MG (1,500 MG)-VITAMIN D3 400 UNIT TABLET 1 TABLET: at 08:46

## 2024-03-29 RX ADMIN — ACETAMINOPHEN 975 MG: 325 TABLET, FILM COATED ORAL at 08:46

## 2024-03-29 RX ADMIN — LEVALBUTEROL HYDROCHLORIDE 1.25 MG: 1.25 SOLUTION RESPIRATORY (INHALATION) at 07:41

## 2024-03-29 RX ADMIN — ACETYLCYSTEINE 2 ML: 100 SOLUTION ORAL; RESPIRATORY (INHALATION) at 07:41

## 2024-03-29 RX ADMIN — ACETYLCYSTEINE 2 ML: 100 SOLUTION ORAL; RESPIRATORY (INHALATION) at 19:26

## 2024-03-29 RX ADMIN — Medication 40 MG: at 08:47

## 2024-03-29 RX ADMIN — HYDROXYZINE HYDROCHLORIDE 25 MG: 25 TABLET, FILM COATED ORAL at 14:00

## 2024-03-29 RX ADMIN — CHLORHEXIDINE GLUCONATE 0.12% ORAL RINSE 15 ML: 1.2 LIQUID ORAL at 19:54

## 2024-03-29 RX ADMIN — OLANZAPINE 5 MG: 5 TABLET, ORALLY DISINTEGRATING ORAL at 21:13

## 2024-03-29 RX ADMIN — Medication 2.5 MCG: at 19:56

## 2024-03-29 RX ADMIN — LIDOCAINE 4% 1 PATCH: 40 PATCH TOPICAL at 19:53

## 2024-03-29 RX ADMIN — ACETAMINOPHEN 975 MG: 325 TABLET, FILM COATED ORAL at 16:40

## 2024-03-29 RX ADMIN — ACETYLCYSTEINE 2 ML: 100 SOLUTION ORAL; RESPIRATORY (INHALATION) at 11:29

## 2024-03-29 RX ADMIN — PIPERACILLIN AND TAZOBACTAM 2.25 G: 2; .25 INJECTION, POWDER, FOR SOLUTION INTRAVENOUS at 08:48

## 2024-03-29 RX ADMIN — CYCLOSPORINE 175 MG: 100 SOLUTION ORAL at 08:48

## 2024-03-29 RX ADMIN — CALCIUM CARBONATE 600 MG (1,500 MG)-VITAMIN D3 400 UNIT TABLET 1 TABLET: at 18:29

## 2024-03-29 RX ADMIN — APIXABAN 2.5 MG: 2.5 TABLET, FILM COATED ORAL at 08:46

## 2024-03-29 RX ADMIN — Medication 40 MG: at 19:56

## 2024-03-29 RX ADMIN — LEVALBUTEROL HYDROCHLORIDE 1.25 MG: 1.25 SOLUTION RESPIRATORY (INHALATION) at 15:44

## 2024-03-29 ASSESSMENT — ACTIVITIES OF DAILY LIVING (ADL)
ADLS_ACUITY_SCORE: 42
ADLS_ACUITY_SCORE: 43
ADLS_ACUITY_SCORE: 42
ADLS_ACUITY_SCORE: 42
ADLS_ACUITY_SCORE: 43
ADLS_ACUITY_SCORE: 42
ADLS_ACUITY_SCORE: 42
ADLS_ACUITY_SCORE: 41
ADLS_ACUITY_SCORE: 43
ADLS_ACUITY_SCORE: 41
ADLS_ACUITY_SCORE: 43
ADLS_ACUITY_SCORE: 42
ADLS_ACUITY_SCORE: 42
ADLS_ACUITY_SCORE: 43
ADLS_ACUITY_SCORE: 43
ADLS_ACUITY_SCORE: 42
ADLS_ACUITY_SCORE: 43
ADLS_ACUITY_SCORE: 43
ADLS_ACUITY_SCORE: 42
ADLS_ACUITY_SCORE: 43
ADLS_ACUITY_SCORE: 42

## 2024-03-29 NOTE — PROGRESS NOTES
03/29/24 1138   Appointment Info   Signing Clinician's Name / Credentials (OT) Karla Lambert OTRL    Rehab Comments (OT) Re-eval   Living Environment   People in Home grandchild(ray);child(ray), adult   Current Living Arrangements house   Home Accessibility stairs to enter home;stairs within home   Number of Stairs, Main Entrance 1   Stair Railings, Main Entrance none   Number of Stairs, Within Home, Primary other (see comments)  (4 right inside house, then to get to bedroom 4 steps then a flight of stairs. The 2 sets of 4 steps has railings on the left, but flight of stairs on the right.)   Stair Railings, Within Home, Primary railing on left side (ascending);railing on right side (ascending)   Transportation Anticipated health plan transportation   Living Environment Comments See initial eval 2/19   Self-Care   Usual Activity Tolerance moderate   Current Activity Tolerance fair   Equipment Currently Used at Home walker, rolling   Fall history within last six months yes   Number of times patient has fallen within last six months 2   Activity/Exercise/Self-Care Comment See initial eval 2/19   Instrumental Activities of Daily Living (IADL)   IADL Comments Unable to assess as pt remains intubated, will obtain as able.   General Information   Onset of Illness/Injury or Date of Surgery 02/10/24   Additional Occupational Profile Info/Pertinent History of Current Problem Per EMR, Sofie Rodriguez is a 63 year old female with PMH notable for bilateral lung transplant in 2022 for COPD, ESRD on HD (T/Th/S), gastroparesis s/p PEG/J, severe malnutrition, recent R femoral fracture s/p ORIF in [place]. Admitted 2/10/24 to [facility] for FTT and small bowel dysmotility for port-a-cath placement and TPN initiation. Transferred to ICU evening of 2/17/24 for acute on chronic hypoxic and hypercarbic respiratory failure with severe hypercarbia and AMS requiring intubation, ddx includes pulmonary edema vs infection, possible mixed  picture.   Existing Precautions/Restrictions fall   Limitations/Impairments safety/cognitive   Left Upper Extremity (Weight-bearing Status) full weight-bearing (FWB)   Right Upper Extremity (Weight-bearing Status) full weight-bearing (FWB)   Left Lower Extremity (Weight-bearing Status) full weight-bearing (FWB)   Right Lower Extremity (Weight-bearing Status) weight-bearing as tolerated (WBAT)   Cognitive Status Examination   Orientation Status orientation to person, place and time   Cognitive Status Comments Pt writing/communicating appropriate, follows commands well   Visual Perception   Visual Impairment/Limitations corrective lenses full-time   Sensory   Sensory Quick Adds sensation intact   Range of Motion Comprehensive   General Range of Motion bilateral upper extremity ROM WFL   Strength Comprehensive (MMT)   Comment, General Manual Muscle Testing (MMT) Assessment generalized weakness   Muscle Tone Assessment   Muscle Tone Quick Adds No deficits were identified   Bed Mobility   Bed Mobility sit-supine   Sit-Supine [place] (Bed Mobility) contact guard   Comment (Bed Mobility) per clinical judgement   Transfers   Transfers sit-stand transfer;toilet transfer   Sit-Stand Transfer   Sit-Stand [place] (Transfers) contact guard   Toilet Transfer   Type (Toilet Transfer) sit-stand   [place] Level (Toilet Transfer) contact guard   Activities of Daily Living   BADL Assessment/Intervention bathing;upper body dressing;lower body dressing;toileting;grooming   Bathing Assessment/Intervention   [place] Level (Bathing) set up;minimum assist (75% patient effort)   Comment, (Bathing) per clincial judgement   Assistive Devices (Bathing) shower chair   Upper Body Dressing Assessment/Training   Comment, (Upper Body Dressing) per clincial judgement   [place] Level (Upper Body Dressing) minimum assist (75% patient effort)   Lower Body Dressing Assessment/Training   Comment, (Lower Body Dressing) per clincial  judgement   [place] Level (Lower Body Dressing) minimum assist (75% patient effort)   Grooming Assessment/Training   [place] Level (Grooming) set up   Toileting   [place] Level (Toileting) moderate assist (50% patient effort)   Clinical Impression   Criteria for Skilled Therapeutic Interventions Met (OT) Yes, treatment indicated   OT Diagnosis Pt is below baseline for ADLs   OT Problem List-Impairments impacting ADL problems related to;activity tolerance impaired;balance;mobility;strength;pain   Assessment of Occupational Performance 5 or more Performance Deficits   Identified Performance Deficits dressing, bathing, toileting, standing g/h tasks, mobility, IADL tasks, muscular strength/endurance   Planned Therapy Interventions (OT) ADL retraining;IADL retraining;balance training;bed mobility training;ROM;strengthening;transfer training;progressive activity/exercise;home program guidelines   Clinical Decision Making Complexity (OT) problem focused assessment/low complexity   Risk & Benefits of therapy have been explained evaluation/treatment results reviewed;care plan/treatment goals reviewed;risks/benefits reviewed;current/potential barriers reviewed;participants voiced agreement with care plan;participants included;patient   Clinical Impression Comments Pt is below baseline, limited by activity tolerance and overall medical status. pt will benefit from skilled OT to progress toward PLOF   OT Total Evaluation Time   OT Eval, Low Complexity Minutes (63863) 6   OT Goals   Therapy Frequency (OT) 5 times/week   OT Predicted Duration/Target Date for Goal Attainment 04/19/24   OT Goals Lower Body Dressing;Lower Body Bathing;Toilet Transfer/Toileting;OT Goal 1   OT: Lower Body Dressing Modified independent;including set-up/clothing retrieval   OT: Lower Body Bathing Modified independent   OT: Toilet Transfer/Toileting Modified independent;toilet transfer;cleaning and garment management   OT: Goal 1 Pt will  demonstrate B UE HEP while seated in chair with SBA prior to dc home.   Self-Care/Home Management   Self-Care/Home Mgmt/ADL, Compensatory, Meal Prep Minutes (41979) 28   OT Discharge Planning   OT Plan activity tolerance, standing ADLs   OT Discharge Recommendation (DC Rec) home with assist;home with home care occupational therapy   OT Rationale for DC Rec Pt mobilizing well, anticipate pt will progress to be safe to d/c home with family A for heavy tasks and HH therapies   OT Brief overview of current status CGA for pivot   Total Session Time   Timed Code Treatment Minutes 28   Total Session Time (sum of timed and untimed services) 34

## 2024-03-29 NOTE — PROGRESS NOTES
"Palliative Care Consultation Note  [facility]      Patient: Sofie Rodriguez  Date of Admission:  2/10/2024    Requesting Clinician / Team: Medicine  Reason for consult: Goals of care  Decisional support       Recommendations & Counseling     GOALS OF CARE:   Care conference held today with dual MICU/pulm transplant staff, MICU EMILY, lung transplant SW, bedside RN, and palliative with family (daughter Julia and son Fletcher over the phone).  Medical summary - Stated that Sofie originally came in due to malnutrition and failure to thrive which was addressed with TPN. This seemed to cause overfeeding and increased need for HD x4 weekly. Also struggled with GI issues an diarrhea. Meds were adjusted and changed over to tube feeds from TPN. Developed worsening hypercapnia (from general weakness/deconditioning?) and has needed BiPAP but unable to tolerate it for more than 1-4 hours and keeps retaining CO2. Suffers from anxiety/claustrophobia but oversedation leads to CO2 retention as well. Developed worsening pulmonary edema and possibly PNA (?) which lead to reintubation on 3/20. Currently progressing positively while on the vent and could be extubated in the coming few days.   Voiced greatest concern about what to do moving forward given that Sofie has needed to be intubated yet again, does not tolerate the BiPAP. Validated that Sofie would want to \"return home and live normally again\". Gave a few potential care pathways:  Extubate --> hope to tolerate BiPAP (likely will need positive pressure for the indefinitely) --> if not, then reintubate and trach OR pursue comfort measures  Plan to trach directly   Palliatively extubate and pursue comfort measures only  Explained logic behind trach (does not have to wear mask) so theoretically more comfortable. Could be capped during the day then she could get around and us positive pressure at night. Also stated that eventually, she could " relearn to swallow and eat. Sofie provided us with a list of foods she hopes to eat when able.   Will look into possibly using AVAPS instead of BiPAP as needed if extubated as Sofie stated she tolerated this in the past.  May be worth pursuing Metabolic cart again if she is extubated to see if refeeding on tube feeds contributed to pulmonary edema this time leading to reintubation  Spoke at length about possibly future barriers to discharge if trach'ed (possibly would live in the hospital if trach pursued due to concurrent tube feed and HD needs). As such, would never get home. Appreciate lung transplant SW clarifying disposition options  Advised Sofie that she is weaker than before and it seems like whenever she makes one step of progress forward she falls back two steps. Stated that if a restorative/life-sustaining pathway was pursued, then she would likely need weeks if not months of therapy/rehab.   Stated we did not need a decision at this time and asked Sofie to think about her future care desires prior to potential extubation so we have a clear plan laid out.  Plan of care - continue restorative cares without limits - pending decision for future cares    ADVANCE CARE PLANNING:  No HCD on file, Sofie noted that her daughter, Charity, should be her surrogate decision maker if she were unable to make decisions for herself   There is no POLST form on file,  recommend continued medical treatment and FULL CODE   Code status: Full Code    MEDICAL MANAGEMENT:   We are not actively managing symptoms at this time.     PSYCHOSOCIAL/SPIRITUAL SUPPORT:  Sofie finds fidel mostly in her friends and family. She lives in [place] in a house with one of her daughters and three of her grandchildren. She has two other adult children as well and four other grandchildren as well.  O[employer]ental orthodox but not involved with the [employer]  Declined PSS needs    Palliative Care will follow peripherally. Please page if needed. Otherwise, we will plan to  check in early-mid next week.     Total time spent was 80 minutes regarding goals of care and support on the date of the encounter. These recommendations were given to the primary team via this note.    Primitivo Pink DO / Internal and Palliative Medicine   Securely message with the Vocera Web Console (learn more here)   Text page via [facility] Paging/Directory       Assessment      Sofie Rodriguez is a 63 year old female with PMH COPD s/p bilateral lung transplant 6/28/22, hemidiaphragm palsy, PEG dependence, ESRD on HD, and SIBO    Initially admitted on 2/10/24 with worsening malnutrition requiring TPN initiation. Her hospital course has been complicated by worsening CO2 retention, pulmonary edema, and AMS requiring BiPAP and intubation with ICU transfer on 2/17. She was extubated and transferred out of the ICU on 2/19. Tolerated iHD on 2/02. Readmitted to the ICU on 2/28 for monitoring due to hypercarbia and respiratory acidosis but improved with AVAPS and transferred back to the floor 2/29. She has been struggling with needing HD x4 weekly from fluid overload from TPN (unable to tolerate po intake due to GI issues) and declining AVAPS due to discomfort. Palliative signed off 3/7. We were reconsulted 3/11 for goals of care. Readmitted to the MICU again for hypercapnic RF (pH 7.05, pCO2 102) on 3/18 on continuous BiPAP needing reintubation 3/20.    Today, the patient was seen for:  Acute on (now) chronic hypercapnic respiratory failure (likely form deconditioning/weakness)  Status post bilateral lung transplant with hemidiaphragm palsy  PEG dependent on tube feeds  ESRD on HD  GO   Support  Encounter for palliative care      Palliative Care Summary:   Seen and examined at bedside. Care conference as above.     Medications:  I have reviewed this patient's medication profile and medications from this hospitalization.        Physical Exam   Vital Signs with Ranges  Temp:  [97.7  F (36.5  C)-98.7  F (37.1  C)] 97.8  F (36.6   C)  Pulse:  [61-80] 68  Resp:  [16-28] 18  BP: ()/() 142/65  FiO2 (%):  [30 %] 30 %  SpO2:  [95 %-100 %] 100 %  100 lbs 12 oz  General: Not in acute distress.  Head: Atraumatic. Normocephalic.   Eyes: Anicteric without injection. Eyes conjugate. Extraocular movements intact.  Ear, Nose, and Throat: Mouth pink and moist without lesions. Neck without overt masses.  Pulmonary: Unlabored. ETT in place.   Cardiovascular: Perfusing.   Abdomen: Non-distended. Feeding tube in place.  Skin: Warm. Scattered ecchymoses.  Musculoskeletal: Sarcopenic.  Neuro: Speech fluent. Face symmetric. No focal neurologic deficit noted. Alert and oriented x4.  Psych: Normal mood and affect. Sensorium, gross memory, thought processes, and fund of knowledge intact.        Data reviewed:  Exam: XR CHEST PORT 1 VIEW 3/29/2024 6:39 AM   Impression: Underlying bilateral lung transplantation   1. Decreased bilateral diffuse pulmonary opacities.  2. Similar small left pleural effusion and overlying left basilar  atelectasis.  3. Stable support devices.    CMP  Recent Labs   Lab 03/29/24  1404 03/29/24  0905 03/29/24  0313 03/29/24  0311 03/28/24  0455 03/28/24  0451 03/27/24  0827 03/27/24  0328 03/26/24  0415 03/26/24  0313   NA  --   --  141  --   --  141  --  138  --  138   POTASSIUM  --   --  3.5  --   --  3.4  --  3.7  --  3.9   CHLORIDE  --   --  101  --   --  101  --  99  --  98   CO2  --   --  28  --   --  27  --  29  --  26   ANIONGAP  --   --  12  --   --  13  --  10  --  14   * 117* 121* 122*   < > 110*   < > 120*   < > 149*   BUN  --   --  25.8*  --   --  49.2*  --  29.2*  --  51.1*   CR  --   --  2.16*  --   --  3.22*  --  1.97*  --  2.84*   GFRESTIMATED  --   --  25*  --   --  16*  --  28*  --  18*   ESTUARDO  --   --  9.4  --   --  8.9  --  8.8  --  9.2   MAG  --   --  1.8  --   --  2.0  --  1.8  --  2.2   PHOS  --   --  4.1  --   --  5.7*  --  3.9  --  5.3*   PROTTOTAL  --   --  5.7*  --   --  5.2*  --  5.2*  --  5.2*    ALBUMIN  --   --  3.4*  --   --  3.2*  --  3.1*  --  3.2*   BILITOTAL  --   --  0.3  --   --  0.3  --  0.4  --  0.5   ALKPHOS  --   --  94  --   --  85  --  93  --  91   AST  --   --  21  --   --  16  --  17  --  18   ALT  --   --  13  --   --  12  --  13  --  11    < > = values in this interval not displayed.     CBC  Recent Labs   Lab 03/29/24 0313 03/28/24 0451 03/27/24 0328 03/26/24 0313   WBC 5.0 4.7 5.5 5.2   RBC 2.60* 2.37* 2.60* 2.30*   HGB 8.9* 8.1* 8.9* 8.2*   HCT 29.8* 27.0* 29.6* 26.7*   * 114* 114* 116*   MCH 34.2* 34.2* 34.2* 35.7*   MCHC 29.9* 30.0* 30.1* 30.7*   RDW 17.0* 17.4* 17.2* 17.3*    180 191 188     INR  Recent Labs   Lab 03/29/24 0313 03/28/24 0451 03/27/24 0328 03/26/24 0313   INR 1.18* 1.25* 1.24* 1.42*     Arterial Blood Gas  Recent Labs   Lab 03/29/24 0313 03/28/24  1920 03/28/24 0451 03/27/24  1737   O2PER 30 30 30 30

## 2024-03-29 NOTE — PLAN OF CARE
ICU End of Shift Summary. See flowsheets for vital signs and detailed assessment.    Changes this shift: A&Ox4. VSS. Afebrile. PS 8/5, 30% all day. Trialed 7/5 and failed d/t low volumes. Small amount of ETT secretions, ongoing copious oral secretions. Several loose, watery BM's. Pt writes down thoughts/questions/concerns. Care conference today with MICU, Palliative, SW, 1 daughter and son, pt and family are going to think about how best to move forward. No plan to trial extubation or trach this weekend- will reconvene Monday to discuss pts thoughts.    Plan: HD tomorrow. Continue to PS.      Goal Outcome Evaluation:      Plan of Care Reviewed With: patient, family    Overall Patient Progress: no changeOverall Patient Progress: no change    Outcome Evaluation: PS 8/5 all day

## 2024-03-29 NOTE — PLAN OF CARE
ICU End of Shift Summary. See flowsheets for vital signs and detailed assessment.    Changes this shift: VSS. Afebrile. Alert and oriented x4. Tolerated pressure support 8/5 30% throughout the night. No complaints of anxiety this shift. PRN tylenol given once. Three bowel movements. Anuric. Tube feeds at goal rate. Pivots to chair/commode with assist of two.     Plan: care conference today at 1230 - patient has a list of written questions for medical team to discuss during care conference.       Goal Outcome Evaluation:      Plan of Care Reviewed With: patient    Overall Patient Progress: no changeOverall Patient Progress: no change    Outcome Evaluation: PS overnight 8/5

## 2024-03-29 NOTE — PROGRESS NOTES
Nephrology Progress Note  03/29/2024         Assessment & Recommendations:   Sofie Rodriguez is a 63 year old year old female with ESKD, COPD s/p b/l lung transplant in 6/2022 with multiple complications, gastroparesis s/p GJ tube, GIB, chronic diarrhea, recurrent c-diff, FTT with inability to tolerate any tube feeds, R hip fx s/p ORIF 12/2023, admitted on 2/10 for initiation of TPN/lipids, transferred to ICU on 2/17 with worsening mental status and respiratory acidosis in spite of bipap, ultimately intubated on 2/18, being treated for PNA, also with volume overload in setting of high volume TPN. Persistent respiratory acidosis in spite of volume off, transferred to ICU for hypotension and respiratory failure, improved on bipap, now floor status again 3/1. Transferred to ICU 3/18 again with worsening hypercapnic respiratory failure. Transferred to floor 3/20, back to ICU 3/24    # ESKD - TTS, LUCUONG AVG, 3hr, 45.5 kg, [facility], Dr. Andres Fairbanks.  - dialyzed daily this week, now back to euvolemia and will hold off on UF run today; continue HD per TTS schedule  - Requires EMLA cream an hour before HD  - please avoid PICC which will compromise future dialysis accesses; a midline is much preferred for this patient    # Persistent respiratory acidosis: when off vent  - difficulty tolerating bipap due to claustrophobia, but wearing this lately  - balancing act between giving bicarb to maintain pH in setting of severe persistent respiratory acidosis with bicarb quickly converting CO2 and worsening overall status  - recommend stopping PO bicarb, ok to restart for pH < 7.1 but would stop again once pH is 7.2; will continue to provide bicarb with dialysis  - transplant pulm following  - re-intubated 3/24 for bronch/BAL (NGTD)    # Aflutter: on amio and BB  # Volume/BP: EDW 45.5 kg. Anuric; on metoprolol soln 25 mg bid   - CXR 3/25 with likely pulm edema, dialyzed daily this week, now appears euvolemic and back to  "EDW  - will continue to gently challenge EDW as tolerated     # Nutrition: on [employer] tube feeds     # Anemia 2/2 ESKD  On Venofer 50 qwk, Mircera last dose 1/9/2024  - hgb downdrifting, 8's  - Will continue Venofer 50 mcg q week (Tuesday)  - continue epogen 8000 units via HD    # BMD:   - Ca 8-9's, phos 4.1, alb 3.4  - sevelamer on hold         Recommendations were communicated to primary team via note     Pema Haider PA   Division of Renal Disease and Hypertension  [phone]    Interval History :   Seen bedside, dialyzed daily this week, now euvolemic. Care conference today, trach being discussed. No n/v, CP, chills.      Review of Systems:   4 point ROS neg other than as noted above    Physical Exam:   I/O last 3 completed shifts:  In: 1701.84 [I.V.:461.84; NG/GT:470]  Out: 2700 [Other:2500; Stool:200]   BP (!) 145/57   Pulse 71   Temp 97.9  F (36.6  C) (Axillary)   Resp 23   Ht 1.57 m (5' 1.81\")   Wt 45.7 kg (100 lb 12 oz)   SpO2 99%   BMI 18.54 kg/m       GENERAL APPEARANCE: on vent, alert and awake  PULM: diminished, on vent  CV: RRR    trace no peripheral  GI: soft   INTEGUMENT: no cyanosis, no rashes on exposed skin  NEURO: a/o3  Access Left AVG     Labs:   All labs reviewed by me  Electrolytes/Renal -   Recent Labs   Lab Test 03/29/24  0905 03/29/24  0313 03/29/24  0311 03/28/24  0455 03/28/24  0451 03/27/24  0827 03/27/24  0328   NA  --  141  --   --  141  --  138   POTASSIUM  --  3.5  --   --  3.4  --  3.7   CHLORIDE  --  101  --   --  101  --  99   CO2  --  28  --   --  27  --  29   BUN  --  25.8*  --   --  49.2*  --  29.2*   CR  --  2.16*  --   --  3.22*  --  1.97*   * 121* 122*   < > 110*   < > 120*   ESTUARDO  --  9.4  --   --  8.9  --  8.8   MAG  --  1.8  --   --  2.0  --  1.8   PHOS  --  4.1  --   --  5.7*  --  3.9    < > = values in this interval not displayed.       CBC -   Recent Labs   Lab Test 03/29/24  0313 03/28/24  0451 03/27/24  0328   WBC 5.0 4.7 5.5   HGB 8.9* 8.1* 8.9* "    180 191       LFTs -   Recent Labs   Lab Test 03/29/24  0313 03/28/24  0451 03/27/24  0328   ALKPHOS 94 85 93   BILITOTAL 0.3 0.3 0.4   ALT 13 12 13   AST 21 16 17   PROTTOTAL 5.7* 5.2* 5.2*   ALBUMIN 3.4* 3.2* 3.1*       Iron Panel -   Recent Labs   Lab Test 09/26/22  0555 09/03/22  1039 08/24/22  0810   IRON 54 21* 41   IRONSAT 22 9* 21   CARLOS 769* 343* 334*         Imaging:  All imaging studies reviewed by me.     Current Medications:   acetylcysteine  2 mL Nebulization 4x daily    amiodarone  400 mg Oral BID    apixaban ANTICOAGULANT  2.5 mg Oral BID    calcium carbonate-vitamin D  1 tablet Per J Tube TID w/meals    chlorhexidine  15 mL Mouth/Throat Q12H    cloNIDine  0.1 mg Oral At Bedtime    [Held by provider] cyanocobalamin  500 mcg Per Feeding Tube Daily    cycloSPORINE modified  175 mg Per G Tube QAM    cycloSPORINE modified  175 mg Per G Tube QPM    heparin lock flush  5-20 mL Intracatheter Q24H    insulin aspart  1-4 Units Subcutaneous Q4H    levalbuterol  1.25 mg Nebulization 4x Daily    levothyroxine  25 mcg Oral QAM AC    lidocaine  1 patch Transdermal Q24h    liothyronine  2.5 mcg Oral BID    metoprolol tartrate  25 mg Per J Tube BID    midodrine  10 mg Oral 3 times per day on Tuesday Thursday Saturday    OLANZapine zydis  5 mg Oral At Bedtime    oxymetazoline  2 spray Both Nostrils BID    pantoprazole  40 mg Per J Tube BID    piperacillin-tazobactam  2.25 g Intravenous Q6H    predniSONE  5 mg Per J Tube QAM    And    predniSONE  2.5 mg Per J Tube QPM    [Held by provider] sevelamer carbonate (RENVELA)  0.8 g Oral BID    [Held by provider] sodium bicarbonate  1,300 mg Oral or Feeding Tube TID    sodium chloride (PF)  10 mL Intracatheter Q8H    sodium chloride (PF)  10-40 mL Intracatheter Q8H    sulfamethoxazole-trimethoprim  1 tablet Oral Q Mon Wed Fri AM      dexmedeTOMIDine Stopped (03/28/24 0851)    dextrose      fentaNYL Stopped (03/25/24 1306)    - MEDICATION INSTRUCTIONS -       norepinephrine Stopped (03/25/24 0200)    - MEDICATION INSTRUCTIONS -      - MEDICATION INSTRUCTIONS -      sodium chloride 0.9%       RABIA Moctezuma

## 2024-03-29 NOTE — PROGRESS NOTES
Care Management Follow Up    Length of Stay (days): 48    Expected Discharge Date:  tbd      Concerns to be Addressed: discharge planning, other (see comments) (New TPN)     Patient plan of care discussed at interdisciplinary rounds: Yes  Additional Information:    RNCC assisted with setting up I-pad for the Care Conference and requested Ace Valverde to lead the Care conference.  Post conference MACHO Valverde has asked to verify, if  HD clinics outpatient will accept patient with tracheostomy.     This writer will reach out to clinics next week.     Historically RNCC team was not able to get outpatient HD placement for trach patient's requiring suctioning.       Angela Johnson, MSN, MA, CCM, RN  4C/4A/4E ICU Care Coordinator  Phone:[phone]  Pager: [phone]    For Weekend & Holiday on call RN Care Coordinator:  [facility] [facility] [place] ([phone]) Saturday & Sunday; ([phone]) [facility] Recognized Holidays   Weekend 4C/4A/4E ICUs /6A Care Coordinator  Pager: [phone]

## 2024-03-29 NOTE — PLAN OF CARE
ICU End of Shift Summary. See flowsheets for vital signs and detailed assessment.    Changes this shift: VSS. SR 60's-80's. Normotensive to slightly hypertensive. Midodrine held today. Afebrile. A&O x4. PST 10/5 30%. Decreased to 8/10 30% @ approximately 1600. PT tolerating well. HD run today. Up to recliner with Ax2. Rectal tube removed. Stool o/p decreased today. Denies pain.     Plan:  Wean as tolerated. Encourage OOB activity. Continue plan of care.         Goal Outcome Evaluation:      Plan of Care Reviewed With: patient    Overall Patient Progress: improvingOverall Patient Progress: improving    Outcome Evaluation: VSS. HD done. PST 8/10.

## 2024-03-29 NOTE — PROGRESS NOTES
MEDICAL ICU PROGRESS NOTE  03/29/2024      Date of Service (when I saw the patient): 03/29/2024    ASSESSMENT: Sofie Rodriguez is a 63 year old female with PMH COPD s/p bilateral lung transplant 6/28/22 c/b hemidiaphragm palsy and recurrent pneumonias, gastroparesis and small bowel dysmotility complicated by severe malnutrition now s/p PEG/J, ESRD on M/W/F HD, recent R femoral fx s/p ORIF, chronic diarrhea, recurrent c-diff, failure to thrive with inability to tolerate any tube feeds, who was [place] [facility] [place]10/24 for concerns over malnutrition and TPN initiation via portacath. Course complicated by recurrent and progressive acute on chronic hypoxic and hypercarbic respiratory failure requiring multiple ICU admissions and intubation 2/18-2/19, 2/28-2/29, 3/18-3/20, for continuous BiPAP. Again with worsening respiratory acidosis and hypercarbia, concern for infection vs acute rejection, requiring transfer back to ICU 3/20/2024 for intubation and bronchoscopy.    CHANGES and MAJOR THINGS TODAY:   - Care Conference today   - Continue PST as able 8/5; slowly attempt to wean down to 7/5  - Re-send stool sodium and potassium      PLAN:     Neuro:  # Acute pain  # Sedation  Intubated and awake, alert. Denies new or acute pain. Uses lidocaine and heating pads as needed for pain management.   - Precedex weaned off   - RASS goal 0 to -1  - Tylenol Q4 prn  - Lidocaine patch prn  - Heating pads prn    # Hx of Anxiety   # Claustrophobia  Reports claustrophobia contributing to limited compliance with BiPAP. Has seen health psych on 3/20, recommended grounding exercise (5-4-3-2-1) for use on BiPAP.   - Zyprexa 5mg at bedtime   - Atarax 25 mg TID PRN for anxiety  - Ativan 0.5 mg PRN for anxiety  - Clonidine 0.1 mg at bedtime     # B/L Neuropathic Pain   New pain b/l this hospitalization. Last A1c <4.2 (7/11/23). Consider possibly 2/2 ESRD. TSH wnl. B12 and B1 elevated, B6 normal. Copper low (56.3), zinc mildly  low (48.3).  B3 in process.   - Consider gabapentin in the coming days   - Neuro Recs:  - No obvious clinical history or exam findings to suggest neurologic/neuromuscular causes of respiratory weakness  - Neuropathy labs currently pending  - Holding B12 supplement (supra-therapeutic 3/15)      Pulmonary:  # Acute on chronic hypoxic and hypercarbic respiratory failure  # Recurrent PNAs, concern for acute infection vs acute rejection  # S/p BSLT 6/8/22 for COPD  # R hemidiaphragm palsy   # Positive DSA   # Suspected CARLEE  # PTA nocturnal O2, 2L   S/p bilateral lung transplant 6/28/22 for COPD. Was admitted 2/10 to address malnutrition and admitted to ICU on 2/17 with hypoxic hypercarbic respiratory failure. Intubated on 2/18 due to worsening oxygen requirements, likely due to pulmonary edema given hx of ESRD requiring HD vs infection given hx of lung transplant, immunosuppressed status, and recurrent pneumonias. Required intubation 2/17 - 2/19. Ongoing respiratory acidosis despite BiPAP/AVAPS, claustrophobic while using to intermittent compliance. Neurology consulted and MRI r/o central cause problem for respiratory drive. Started on AVAPS with improvement in mental status and VBG, and patient transferred back to medicine floor on 2/29. 3/08 SNIFF with no definite paradoxical movement of bilateral hemidiaphragm. Transferred back to ICU 3/18-3/20 d/t hypercarbia, severe respiratory acidosis, but did not require intubation during this time. Issues with anxiety/claustrophobia while using the mask, Atarax has helped though Zyprexa led to hallucinations. Patient's baseline appears to be with pH mid 7.2s and pCO2  mid 70-80s at best. Even with pH this low, and pCO2 that high, mentation remains stable. CT chest 3/24 w/ scattered opacity throughout all lungs; dilated pulm artery. On BiPAP 16/5 at time of transfer. Transplant pulm concerned for infectious vs acute rejection picture, recommending intubation + bronchoscopy.  Volume overload + pulmonary edema complicating picture, as patient has had low pressures limiting HD runs.   - Daily VBG  - Intubated on 03/24   - Transplant pulm following, appreciate recs               - 3/18 prospera in process  - Immunosuppression:   >Prednisone 5 mg every morning, 2.5 mg every afternoon  >Cyclosporine 200mg BID (Stopped tacrolimus 3/10)   >Overnight oximetry study suggestive of O2 vs CPAP need, as did require up to 2LPM overnight to prevent hypoxia  >Try to minimize O2 to preserve respiratory drive, will give IVIG for DSA+   >D/c'd theophylline 3/3/24 due to difficulty attaining therapeutic levels (started by ICU), could consider Modafinil   >Repeat metabolic CART study ordered by Transplant Pulm   - Airway clearance/nebs:   - Xopenex neb BID  - Hypertonic saline nebs q 3 hours PRN   - Volume removal with iHD               - Will need to place lines for CRRT if unable to run UF  - Sleep clinic eval at discharge for suspected CARLEE  - Limit medications that would depress respiration  - Continue PST 8/5 as long as tolerated; attempt to slowly wean down to 7/5 today.   - Chest XR scheduled for 03/29    Vent Mode: CPAP/PS  (Continuous positive airway pressure with Pressure Support)  FiO2 (%): 30 %  PEEP (cm H2O): 5 cmH2O  Pressure Support (cm H2O): (S) 8 cmH2O  Resp: 17    # Goals of Care  Care conference on 3/15 with Transplant Pulm, Pall Care, Medicine, daughters (Julia Nash) and Transplant SW. Overall medical updates provided and Qs answered. With declining respiratory acidosis on labs, discussed code status 3/18. Patient initially not desiring any escalation of her care to ICU/intubation with frustration re overall course. However, after discussing with her daughter on the phone she and family elected to remain full code and agreed to ICU admission and intubation if necessary.   - Care conference today: Laid out a few options for family and patient to consider. Examples being we could attempt  to extubate to BiPAP but plan should be established prior to extubation that if patient decompensates and requires reintubation then likely pursue trach versus more comfort approach. Other option is going straight for trach now. Patient and family considering the options and had a lot of questions about trach and what that would look like long-term, which was laid out for the family and patient.       Cardiovascular:  # A-flutter (recurrent on 3/17)  # A-fib, intermittent  # HTN  # HFpEF  Noted Afib/flutter intermittently throughout admission. Was started on amiodarone bolus with drip and transitioned to PO dosing.  2/17 TTE: LVEF 55-60%, global RV function normal, no significant valvular abnormalities. Briefly required levophed and midodrine for HD but otherwise stable off pressors.   - MAP goal > 65 mmHg   - Metoprolol tartrate dose 25 mg BID (hold if MAP<65)  - Continue amiodarone 400mg BID PO, can decrease dose to qDay on 04/01  - Midodrine with HD      GI/Nutrition:  # Severe malnutrition   # Failure to thrive  # Hypoalbuminemia  # Gastroparesis, small bowel dysmotility  # S/P PEG/J with intolerance of enteral nutrition  # Chronic osmotic diarrhea/SIBO s/p Rifaximin  # Recurrent C.Diff (3rd ep 3/12/24)    # GERD  Patient with gastroparesis (presumed due to vagal injury) and small bowel dysmotility complicated by unintentional 40lb weight loss over the past year and now severe malnutrition. Previously intolerant to oral food intake due to nausea. Was initially admitted for portacath and TPN initiation since 2/13. Intermittently tolerating feeds without n/v from 2/20.  Per GI, no ongoing concerns for SIBO as of 2/23. As of 3/11 transplant pulmonology is worried that TPN is not a realistic plan to continue at home and would like to transition back to TF (RD oncerned pt is not absorbing any oral intake). TPN discontinued 3/16. Transplant pulm also worried about mucosal toxicity. Recurrent C.diff 3/12, Fidaxomicin x  10 days (3/13-3/22), with improvement in diarrhea. Transplant pulm requesting repeat colonoscopy w/ Bx after completion of c.diff treatment, to r/o toxicity or other reason that diarrhea is present.   - Nutrition consulted, appreciate assistance  - Continue TF at goal rate 35 ml/hr via PEG/J          - Consider reconsult GI week of 3/25/after metabolic CART study, for future colonoscopy +/- biopsy if diarrhea returns   - PPI BID  - Bowel regimen PRN  - Stool potassium and sodium sent for stool osmotic test to evaluate cause of diarrhea (resent today)   - If concerned regarding osmotic diarrhea, please order stool sodium and stool potassium and utilize these values to calculate the stool osmotic gap using formula: 290 - 2 (Stool Sodium + Stool Potassium).  - An osmotic gap < 50 mOsm/kg is considered normal.  - An osmotic gap > 125 mOsm/kg is consistent with a pure osmotic diarrhea.   - Differential for osmotic diarrhea includes medications (antibiotics, lactulose, antacids, sorbitol, laxative abuse, magnesium ingestion), carbohydrate malabsorption/disaccharidase deficiencies (i.e., lactose intolerance), high ingestion of sugar substitutes or fructose.       Renal/Fluids/Electrolytes:  # ESRD on HD  # Mild hyperphosphatemia  Patient is ESRD on T/Th/Sat HD as outpt, now approx M/W/F inpatient. HD tolerating until 3/23. Briefly required extra Monday runs, not since being off TPN. She would prefer longer sessions to more days.  - Midodrine 10mg q8h with dialysis days   - Currently holding Sevelamer but may need to resume in the coming days per Nephrology   - CBC and CMP daily  - Strict I/Os  - Daily weight   - Renally adjust medications      Endocrine:  # Hyperglycemia, stress induced  - Low dose sliding scale insulin  - Hypoglycemia protocol     # Hypothyroidism  Patient with new (2/17/24) low T4 at 0.64 and TSH at 6.5, concerning for new hypothyroidism vs sick thyroid syndrome. TSH with appropriate response, more  consistent with elevation in the setting of acute illness. ICU team on  recommended initiation of treatment for possible hypothyroid as this can improve respiratory muscle function in the setting of weakness and malnutrition. 3/7, 3/15, 3/20 repeat thyroid function WNL.  - Continue liothyronine + levothyroxine -- note that prolonged combination therapy is not optimal & regimen should be re-evaluated (likely stop liothyronine, up-titrate levothyroxine) in post-acute setting      ID:  # Recurrent pneumonia, concern for acute infection vs rejection  # Recurrent C.Diff colitis (3rd ep 3/12/24)  # Hx EBV viremia   # Hypogammaglobulinemia  # Chronic immunosuppression  lung transplant  Empirically treated for pneumonia  - , RVP and cultures no growth to date. Recurrent C.Diff Positive 3/12, s/p PO Fidaxomicin 200 mg BID for 10 days (3/13-3/22) with improvement in diarrhea. Remains afebrile, leukocytosis resolved.  Ig, s/p IVIG.  EBV 27K (decreased compared to prior).     - Follow up BAL and blood cultures from 3/24  - Continue empiric antibiotic as below    Antibiotics:  Zosyn ( - , 3/24-current)  Bactrim (MWF, PJP ppx, previously on Dapsone)  Vancomycin ( - , 3/24-3/25)  Micafungin (- , 3/24 x1)  Fidaxomicin (3/13-3/22)    Micro:   - BAL 3/24:   - Cell count w diff: PMN 75%, lymphocytes 5, monocytes 19, eos 1  - No organisms seen on Gram stain  - RVP, HSV, Histoplasma neg  - Fungal & bacterial cultures NGTD  - EBV, CMV, PJP, Aspergillus, Legionella, Mycoplasma, AFB in process  - Bcx 3/24 NGTD    Hematology:    #Acute on chronic anemia 2/2 ESRD  Hgb stable, with no acute signs of bleeding.   - Daily CBC  - Transfuse for Hgb < 7  - Continue Epogen with dialysis, held in setting of concern for acute infection   - Continue Venofer 50 mcg qweek with dialysis, held in setting of concern for acute infection     #Steal physiology of LUE dialysis access fistula  LUE arterial US  obtained 2/21 with concern for LUE edema. US of fistula demonstrated steal physiology. Discussed with nephrology, and vascular surgery consulted on 2/22. Vascular surgery has only minor concerns for steal symptoms and given that the AVF is working well, they are okay with outpatient fistulograms/ venoplasty with wrist brachial index and PPG's, unless new concerns arise.  - Plan for outpatient workup as above; nephrology in agreement with outpatient workup.    Musculoskeletal:  # Right hip fracture s/p ORIF (December 2023)   - PT/OT to eval and treat      Skin:  # Left upper extremity unilateral edema, improving   # Bilateral pedal and ankle edema, improving  Edema due to third spacing due to hypoalbuminemia vs HF. BNP >45466 at admission, Echo on 2/18 shows EF of 55-60% with collapsible IVC. Extremity edema 2/2 to hypoalbuminemia. Upper limb duplex USG on 2/18 negative for DVT.  USG left arm on 2/21 shows steal physiology and Vascular Surgery consulted (see Heme section). Overall edema in extremities have improved significantly with dialysis.    - Elevation and wrapping of only lower legs as needed and increased UF per nephrology for volume management; no wrapping of left upper extremity with dialysis fistula      General Cares/Prophylaxis:    DVT Prophylaxis: Apixaban  GI Prophylaxis: PPI  Restraints: N/A  Family Communication: Daughters Charity & Julia  Code Status: FULL      Lines/tubes/drains:  - PEG/J  - PICC line in RUE   - PIV x1  - Rectal tube      Disposition:  - Medical ICU      Patient seen and findings/plan discussed with medical ICU staff, Dr. Franks.    Total patient care time includes 65 minutes     Clinically Significant Risk Factors              # Hypoalbuminemia: Lowest albumin = 2.6 g/dL at 2/18/2024  5:13 AM, will monitor as appropriate  # Coagulation Defect: INR = 1.18 (Ref range: 0.85 - 1.15) and/or PTT = N/A, will monitor for bleeding            # Severe Malnutrition: based on nutrition  assessment      # Financial/Environmental Concerns: none            ====================================  INTERVAL HISTORY:   NAD. Patient interactive today, had care conference with patient and family via ipad in the room. Patient aware of options going forward and will take a little time to consider options. Otherwise continues with PST, has been tolerating 8/5 throughout the day and night, attempt to wean to 7/5 but TV reduced to 180-200, but will continue to try.       OBJECTIVE:   1. VITAL SIGNS:   Temp:  [97.7  F (36.5  C)-98.7  F (37.1  C)] 97.9  F (36.6  C)  Pulse:  [59-80] 69  Resp:  [16-28] 17  BP: ()/() 135/66  FiO2 (%):  [30 %] 30 %  SpO2:  [96 %-100 %] 100 %  Vent Mode: CPAP/PS  (Continuous positive airway pressure with Pressure Support)  FiO2 (%): 30 %  PEEP (cm H2O): 5 cmH2O  Pressure Support (cm H2O): (S) 8 cmH2O  Resp: 17  PIP 25    2. INTAKE/ OUTPUT:   I/O last 3 completed shifts:  In: 1705.64 [I.V.:465.64; NG/GT:470]  Out: 2800 [Other:2500; Stool:300]  Net -2106 past 24h    3. PHYSICAL EXAMINATION:  General: awake, alert & oriented, resting in bed, communicating via writing  HEENT: Mucous membranes moist  Neuro: Awake and alert, no focal deficits, moving all extremities to command and spontaneously  Pulm/Resp: Coarse breath sounds bilaterally, diminished on R>L, no accessory muscle use  CV: RRR, S1/S2 without m/r/g; pedal pulses 2+ without LE edema  Abdomen: Soft, non-distended, non-tender  : rectal tube in place  Incisions/Skin: PICC and PEG site without surrounding erythema, no rashes noted    4. LABS:   Venous Blood Gas  Recent Labs   Lab 03/29/24  0313 03/28/24  1920 03/28/24  0451 03/27/24  1737   PHV 7.35 7.38 7.35 7.38   PCO2V 59* 56* 55* 52*   PO2V 44 43 46 40   HCO3V 32* 33* 30* 31*   LINDY 5.6* 6.5* 3.8* 4.7*   O2PER 30 30 30 30     Complete Blood Count   Recent Labs   Lab 03/29/24  0313 03/28/24  0451 03/27/24  0328 03/26/24 0313   WBC 5.0 4.7 5.5 5.2   HGB 8.9* 8.1* 8.9*  8.2*    180 191 188     Basic Metabolic Panel  Recent Labs   Lab 03/29/24 0313 03/29/24 0311 03/29/24  0039 03/28/24 2054 03/28/24 0455 03/28/24 0451 03/27/24 0827 03/27/24 0328 03/26/24 0415 03/26/24 0313     --   --   --   --  141  --  138  --  138   POTASSIUM 3.5  --   --   --   --  3.4  --  3.7  --  3.9   CHLORIDE 101  --   --   --   --  101  --  99  --  98   CO2 28  --   --   --   --  27  --  29  --  26   BUN 25.8*  --   --   --   --  49.2*  --  29.2*  --  51.1*   CR 2.16*  --   --   --   --  3.22*  --  1.97*  --  2.84*   * 122* 133* 94   < > 110*   < > 120*   < > 149*    < > = values in this interval not displayed.     Liver Function Tests  Recent Labs   Lab 03/29/24 0313 03/28/24 0451 03/27/24 0328 03/26/24 0313   AST 21 16 17 18   ALT 13 12 13 11   ALKPHOS 94 85 93 91   BILITOTAL 0.3 0.3 0.4 0.5   ALBUMIN 3.4* 3.2* 3.1* 3.2*   INR 1.18* 1.25* 1.24* 1.42*     Coagulation Profile  Recent Labs   Lab 03/29/24 0313 03/28/24 0451 03/27/24 0328 03/26/24 0313   INR 1.18* 1.25* 1.24* 1.42*       5. RADIOLOGY:   No results found for this or any previous visit (from the past 24 hour(s)).

## 2024-03-30 LAB
ALBUMIN SERPL BCG-MCNC: 3.5 G/DL (ref 3.5–5.2)
ALP SERPL-CCNC: 92 U/L (ref 40–150)
ALT SERPL W P-5'-P-CCNC: 14 U/L (ref 0–50)
ANION GAP SERPL CALCULATED.3IONS-SCNC: 14 MMOL/L (ref 7–15)
AST SERPL W P-5'-P-CCNC: 18 U/L (ref 0–45)
BASE EXCESS BLDV CALC-SCNC: 2.5 MMOL/L (ref -3–3)
BASE EXCESS BLDV CALC-SCNC: 4 MMOL/L (ref -3–3)
BASOPHILS # BLD AUTO: ABNORMAL 10*3/UL
BASOPHILS # BLD MANUAL: 0 10E3/UL (ref 0–0.2)
BASOPHILS NFR BLD AUTO: ABNORMAL %
BASOPHILS NFR BLD MANUAL: 0 %
BILIRUB SERPL-MCNC: 0.3 MG/DL
BUN SERPL-MCNC: 48.7 MG/DL (ref 8–23)
CALCIUM SERPL-MCNC: 9.3 MG/DL (ref 8.8–10.2)
CHLORIDE SERPL-SCNC: 98 MMOL/L (ref 98–107)
CREAT SERPL-MCNC: 3.25 MG/DL (ref 0.51–0.95)
CYCLOSPORINE BLD LC/MS/MS-MCNC: 250 UG/L (ref 50–400)
DEPRECATED HCO3 PLAS-SCNC: 26 MMOL/L (ref 22–29)
EGFRCR SERPLBLD CKD-EPI 2021: 16 ML/MIN/1.73M2
EOSINOPHIL # BLD AUTO: ABNORMAL 10*3/UL
EOSINOPHIL # BLD MANUAL: 0.6 10E3/UL (ref 0–0.7)
EOSINOPHIL NFR BLD AUTO: ABNORMAL %
EOSINOPHIL NFR BLD MANUAL: 11 %
ERYTHROCYTE [DISTWIDTH] IN BLOOD BY AUTOMATED COUNT: 16.9 % (ref 10–15)
GLUCOSE BLDC GLUCOMTR-MCNC: 101 MG/DL (ref 70–99)
GLUCOSE BLDC GLUCOMTR-MCNC: 115 MG/DL (ref 70–99)
GLUCOSE SERPL-MCNC: 113 MG/DL (ref 70–99)
HCO3 BLDV-SCNC: 30 MMOL/L (ref 21–28)
HCO3 BLDV-SCNC: 31 MMOL/L (ref 21–28)
HCT VFR BLD AUTO: 28.6 % (ref 35–47)
HGB BLD-MCNC: 8.7 G/DL (ref 11.7–15.7)
IMM GRANULOCYTES # BLD: ABNORMAL 10*3/UL
IMM GRANULOCYTES NFR BLD: ABNORMAL %
INR PPP: 1.24 (ref 0.85–1.15)
LYMPHOCYTES # BLD AUTO: ABNORMAL 10*3/UL
LYMPHOCYTES # BLD MANUAL: 0.8 10E3/UL (ref 0.8–5.3)
LYMPHOCYTES NFR BLD AUTO: ABNORMAL %
LYMPHOCYTES NFR BLD MANUAL: 16 %
MAGNESIUM SERPL-MCNC: 1.9 MG/DL (ref 1.7–2.3)
MCH RBC QN AUTO: 34.7 PG (ref 26.5–33)
MCHC RBC AUTO-ENTMCNC: 30.4 G/DL (ref 31.5–36.5)
MCV RBC AUTO: 114 FL (ref 78–100)
MONOCYTES # BLD AUTO: ABNORMAL 10*3/UL
MONOCYTES # BLD MANUAL: 0.6 10E3/UL (ref 0–1.3)
MONOCYTES NFR BLD AUTO: ABNORMAL %
MONOCYTES NFR BLD MANUAL: 11 %
NEUTROPHILS # BLD AUTO: ABNORMAL 10*3/UL
NEUTROPHILS # BLD MANUAL: 3.2 10E3/UL (ref 1.6–8.3)
NEUTROPHILS NFR BLD AUTO: ABNORMAL %
NEUTROPHILS NFR BLD MANUAL: 62 %
NRBC # BLD AUTO: 0 10E3/UL
NRBC BLD AUTO-RTO: 0 /100
O2/TOTAL GAS SETTING VFR VENT: 30 %
O2/TOTAL GAS SETTING VFR VENT: 30 %
OSMOLALITY STL: 336 MOSM/KG
OXYHGB MFR BLDV: 76 % (ref 70–75)
OXYHGB MFR BLDV: 77 % (ref 70–75)
PCO2 BLDV: 60 MM HG (ref 40–50)
PCO2 BLDV: 62 MM HG (ref 40–50)
PH BLDV: 7.3 [PH] (ref 7.32–7.43)
PH BLDV: 7.31 [PH] (ref 7.32–7.43)
PHOSPHATE SERPL-MCNC: 5.9 MG/DL (ref 2.5–4.5)
PLAT MORPH BLD: ABNORMAL
PLATELET # BLD AUTO: 180 10E3/UL (ref 150–450)
PO2 BLDV: 47 MM HG (ref 25–47)
PO2 BLDV: 47 MM HG (ref 25–47)
POTASSIUM SERPL-SCNC: 3.8 MMOL/L (ref 3.4–5.3)
POTASSIUM STL-SCNC: 44 MMOL/L
PROT SERPL-MCNC: 5.7 G/DL (ref 6.4–8.3)
RBC # BLD AUTO: 2.51 10E6/UL (ref 3.8–5.2)
RBC MORPH BLD: ABNORMAL
SAO2 % BLDV: 77.3 % (ref 70–75)
SAO2 % BLDV: 78.8 % (ref 70–75)
SODIUM SERPL-SCNC: 138 MMOL/L (ref 135–145)
SODIUM STL-SCNC: 53 MMOL/L
TME LAST DOSE: NORMAL H
TME LAST DOSE: NORMAL H
VARIANT LYMPHS BLD QL SMEAR: PRESENT
WBC # BLD AUTO: 5.2 10E3/UL (ref 4–11)

## 2024-03-30 PROCEDURE — 250N000013 HC RX MED GY IP 250 OP 250 PS 637

## 2024-03-30 PROCEDURE — 84100 ASSAY OF PHOSPHORUS: CPT | Performed by: STUDENT IN AN ORGANIZED HEALTH CARE EDUCATION/TRAINING PROGRAM

## 2024-03-30 PROCEDURE — 250N000012 HC RX MED GY IP 250 OP 636 PS 637

## 2024-03-30 PROCEDURE — 250N000009 HC RX 250

## 2024-03-30 PROCEDURE — 85027 COMPLETE CBC AUTOMATED: CPT

## 2024-03-30 PROCEDURE — 99233 SBSQ HOSP IP/OBS HIGH 50: CPT | Performed by: NURSE PRACTITIONER

## 2024-03-30 PROCEDURE — 83735 ASSAY OF MAGNESIUM: CPT | Performed by: STUDENT IN AN ORGANIZED HEALTH CARE EDUCATION/TRAINING PROGRAM

## 2024-03-30 PROCEDURE — 250N000011 HC RX IP 250 OP 636

## 2024-03-30 PROCEDURE — 94640 AIRWAY INHALATION TREATMENT: CPT | Mod: 76

## 2024-03-30 PROCEDURE — 99207 PR NO BILLABLE SERVICE THIS VISIT: CPT | Performed by: INTERNAL MEDICINE

## 2024-03-30 PROCEDURE — 258N000003 HC RX IP 258 OP 636: Performed by: INTERNAL MEDICINE

## 2024-03-30 PROCEDURE — 80053 COMPREHEN METABOLIC PANEL: CPT

## 2024-03-30 PROCEDURE — 250N000012 HC RX MED GY IP 250 OP 636 PS 637: Performed by: INTERNAL MEDICINE

## 2024-03-30 PROCEDURE — 82805 BLOOD GASES W/O2 SATURATION: CPT

## 2024-03-30 PROCEDURE — 94640 AIRWAY INHALATION TREATMENT: CPT

## 2024-03-30 PROCEDURE — 85610 PROTHROMBIN TIME: CPT | Performed by: STUDENT IN AN ORGANIZED HEALTH CARE EDUCATION/TRAINING PROGRAM

## 2024-03-30 PROCEDURE — 999N000157 HC STATISTIC RCP TIME EA 10 MIN

## 2024-03-30 PROCEDURE — 85007 BL SMEAR W/DIFF WBC COUNT: CPT

## 2024-03-30 PROCEDURE — 634N000001 HC RX 634: Mod: JZ | Performed by: INTERNAL MEDICINE

## 2024-03-30 PROCEDURE — 200N000002 HC R&B ICU UMMC

## 2024-03-30 PROCEDURE — 250N000013 HC RX MED GY IP 250 OP 250 PS 637: Performed by: STUDENT IN AN ORGANIZED HEALTH CARE EDUCATION/TRAINING PROGRAM

## 2024-03-30 PROCEDURE — 94003 VENT MGMT INPAT SUBQ DAY: CPT

## 2024-03-30 PROCEDURE — 94799 UNLISTED PULMONARY SVC/PX: CPT

## 2024-03-30 PROCEDURE — 80158 DRUG ASSAY CYCLOSPORINE: CPT | Performed by: INTERNAL MEDICINE

## 2024-03-30 PROCEDURE — 99233 SBSQ HOSP IP/OBS HIGH 50: CPT | Performed by: INTERNAL MEDICINE

## 2024-03-30 PROCEDURE — 99291 CRITICAL CARE FIRST HOUR: CPT

## 2024-03-30 PROCEDURE — 90937 HEMODIALYSIS REPEATED EVAL: CPT

## 2024-03-30 RX ORDER — GLYCOPYRROLATE 1 MG/1
2 TABLET ORAL 2 TIMES DAILY
Qty: 8 TABLET | Refills: 0 | Status: COMPLETED | OUTPATIENT
Start: 2024-03-30 | End: 2024-03-31

## 2024-03-30 RX ORDER — CYCLOSPORINE 100 MG/ML
125 SOLUTION ORAL
Status: DISCONTINUED | OUTPATIENT
Start: 2024-03-31 | End: 2024-04-03

## 2024-03-30 RX ADMIN — LEVALBUTEROL HYDROCHLORIDE 1.25 MG: 1.25 SOLUTION RESPIRATORY (INHALATION) at 19:55

## 2024-03-30 RX ADMIN — SODIUM CHLORIDE 300 ML: 9 INJECTION, SOLUTION INTRAVENOUS at 13:42

## 2024-03-30 RX ADMIN — Medication: at 13:43

## 2024-03-30 RX ADMIN — GLYCOPYRROLATE 2 MG: 1 TABLET ORAL at 12:02

## 2024-03-30 RX ADMIN — PREDNISONE 2.5 MG: 2.5 TABLET ORAL at 21:32

## 2024-03-30 RX ADMIN — CHLORHEXIDINE GLUCONATE 0.12% ORAL RINSE 15 ML: 1.2 LIQUID ORAL at 22:02

## 2024-03-30 RX ADMIN — SODIUM CHLORIDE 250 ML: 9 INJECTION, SOLUTION INTRAVENOUS at 13:41

## 2024-03-30 RX ADMIN — PIPERACILLIN SODIUM AND TAZOBACTAM SODIUM 2.25 G: 2; .25 INJECTION, POWDER, LYOPHILIZED, FOR SOLUTION INTRAVENOUS at 17:40

## 2024-03-30 RX ADMIN — LEVALBUTEROL HYDROCHLORIDE 1.25 MG: 1.25 SOLUTION RESPIRATORY (INHALATION) at 08:41

## 2024-03-30 RX ADMIN — Medication 40 MG: at 07:56

## 2024-03-30 RX ADMIN — Medication 40 MG: at 21:31

## 2024-03-30 RX ADMIN — METOPROLOL TARTRATE 25 MG: 25 TABLET, FILM COATED ORAL at 21:32

## 2024-03-30 RX ADMIN — CHLORHEXIDINE GLUCONATE 0.12% ORAL RINSE 15 ML: 1.2 LIQUID ORAL at 07:55

## 2024-03-30 RX ADMIN — HYDROXYZINE HYDROCHLORIDE 25 MG: 25 TABLET, FILM COATED ORAL at 05:45

## 2024-03-30 RX ADMIN — OXYMETAZOLINE HYDROCHLORIDE 2 SPRAY: 0.05 SPRAY NASAL at 07:58

## 2024-03-30 RX ADMIN — PIPERACILLIN SODIUM AND TAZOBACTAM SODIUM 2.25 G: 2; .25 INJECTION, POWDER, LYOPHILIZED, FOR SOLUTION INTRAVENOUS at 01:25

## 2024-03-30 RX ADMIN — ACETYLCYSTEINE 2 ML: 100 SOLUTION ORAL; RESPIRATORY (INHALATION) at 13:25

## 2024-03-30 RX ADMIN — CYCLOSPORINE 175 MG: 100 SOLUTION ORAL at 07:57

## 2024-03-30 RX ADMIN — LEVALBUTEROL HYDROCHLORIDE 1.25 MG: 1.25 SOLUTION RESPIRATORY (INHALATION) at 17:31

## 2024-03-30 RX ADMIN — PREDNISONE 5 MG: 5 TABLET ORAL at 07:55

## 2024-03-30 RX ADMIN — AMIODARONE HYDROCHLORIDE 400 MG: 200 TABLET ORAL at 21:31

## 2024-03-30 RX ADMIN — CALCIUM CARBONATE 600 MG (1,500 MG)-VITAMIN D3 400 UNIT TABLET 1 TABLET: at 17:40

## 2024-03-30 RX ADMIN — LEVALBUTEROL HYDROCHLORIDE 1.25 MG: 1.25 SOLUTION RESPIRATORY (INHALATION) at 13:25

## 2024-03-30 RX ADMIN — LIDOCAINE: 40 CREAM TOPICAL at 12:00

## 2024-03-30 RX ADMIN — ACETAMINOPHEN 975 MG: 325 TABLET, FILM COATED ORAL at 01:33

## 2024-03-30 RX ADMIN — AMIODARONE HYDROCHLORIDE 400 MG: 200 TABLET ORAL at 07:55

## 2024-03-30 RX ADMIN — EPOETIN ALFA-EPBX 8000 UNITS: 10000 INJECTION, SOLUTION INTRAVENOUS; SUBCUTANEOUS at 13:43

## 2024-03-30 RX ADMIN — METOPROLOL TARTRATE 25 MG: 25 TABLET, FILM COATED ORAL at 07:54

## 2024-03-30 RX ADMIN — APIXABAN 2.5 MG: 2.5 TABLET, FILM COATED ORAL at 07:55

## 2024-03-30 RX ADMIN — PIPERACILLIN SODIUM AND TAZOBACTAM SODIUM 2.25 G: 2; .25 INJECTION, POWDER, LYOPHILIZED, FOR SOLUTION INTRAVENOUS at 07:55

## 2024-03-30 RX ADMIN — LEVOTHYROXINE SODIUM 25 MCG: 0.03 TABLET ORAL at 05:45

## 2024-03-30 RX ADMIN — Medication 2.5 MCG: at 21:53

## 2024-03-30 RX ADMIN — PIPERACILLIN SODIUM AND TAZOBACTAM SODIUM 2.25 G: 2; .25 INJECTION, POWDER, LYOPHILIZED, FOR SOLUTION INTRAVENOUS at 21:39

## 2024-03-30 RX ADMIN — ACETYLCYSTEINE 2 ML: 100 SOLUTION ORAL; RESPIRATORY (INHALATION) at 17:31

## 2024-03-30 RX ADMIN — CLONIDINE HYDROCHLORIDE 0.1 MG: 0.1 TABLET ORAL at 21:31

## 2024-03-30 RX ADMIN — LIDOCAINE 4% 1 PATCH: 40 PATCH TOPICAL at 21:56

## 2024-03-30 RX ADMIN — APIXABAN 2.5 MG: 2.5 TABLET, FILM COATED ORAL at 21:32

## 2024-03-30 RX ADMIN — CALCIUM CARBONATE 600 MG (1,500 MG)-VITAMIN D3 400 UNIT TABLET 1 TABLET: at 07:55

## 2024-03-30 RX ADMIN — ACETYLCYSTEINE 2 ML: 100 SOLUTION ORAL; RESPIRATORY (INHALATION) at 19:55

## 2024-03-30 RX ADMIN — ACETYLCYSTEINE 2 ML: 100 SOLUTION ORAL; RESPIRATORY (INHALATION) at 08:41

## 2024-03-30 RX ADMIN — Medication 2.5 MCG: at 07:58

## 2024-03-30 RX ADMIN — OLANZAPINE 5 MG: 5 TABLET, ORALLY DISINTEGRATING ORAL at 21:32

## 2024-03-30 RX ADMIN — CALCIUM CARBONATE 600 MG (1,500 MG)-VITAMIN D3 400 UNIT TABLET 1 TABLET: at 12:02

## 2024-03-30 ASSESSMENT — ACTIVITIES OF DAILY LIVING (ADL)
ADLS_ACUITY_SCORE: 39
ADLS_ACUITY_SCORE: 41
ADLS_ACUITY_SCORE: 41
ADLS_ACUITY_SCORE: 39
ADLS_ACUITY_SCORE: 41
ADLS_ACUITY_SCORE: 39
ADLS_ACUITY_SCORE: 41
ADLS_ACUITY_SCORE: 41
ADLS_ACUITY_SCORE: 39
ADLS_ACUITY_SCORE: 41
ADLS_ACUITY_SCORE: 41
ADLS_ACUITY_SCORE: 39
ADLS_ACUITY_SCORE: 39
ADLS_ACUITY_SCORE: 41

## 2024-03-30 NOTE — PROGRESS NOTES
Nephrology Progress Note  03/30/2024         Assessment & Recommendations:   Sofie Rodriguez is a 63 year old year old female with ESKD, COPD s/p b/l lung transplant in 6/2022 with multiple complications, gastroparesis s/p GJ tube, GIB, chronic diarrhea, recurrent c-diff, FTT with inability to tolerate any tube feeds, R hip fx s/p ORIF 12/2023, admitted on 2/10 for initiation of TPN/lipids, transferred to ICU on 2/17 with worsening mental status and respiratory acidosis in spite of bipap, ultimately intubated on 2/18, being treated for PNA, also with volume overload in setting of high volume TPN. Persistent respiratory acidosis in spite of volume off, transferred to ICU for hypotension and respiratory failure, improved on bipap, now floor status again 3/1. Transferred to ICU 3/18 again with worsening hypercapnic respiratory failure. Transferred to floor 3/20, back to ICU 3/24    # ESKD - TTS, JANAY SAEZG, 3hr, 45.5 kg, [facility], Dr. Andres Fairbanks.  - dialyzed daily this week, now back to euvolemia and will continue HD per TTS schedule  HD today  - Requires EMLA cream an hour before HD  - please avoid PICC which will compromise future dialysis accesses; a midline is much preferred for this patient    # Persistent respiratory acidosis: when off vent  - difficulty tolerating bipap due to claustrophobia, but wearing this lately  - balancing act between giving bicarb to maintain pH in setting of severe persistent respiratory acidosis with bicarb quickly converting CO2 and worsening overall status  - recommend stopping PO bicarb, ok to restart for pH < 7.1 but would stop again once pH is 7.2; will continue to provide bicarb with dialysis  - transplant pulm following  - re-intubated 3/24 for bronch/BAL (NGTD), status improving with less trach support.    # Aflutter: on amio and BB  # Volume/BP: EDW 45.5 kg. Anuric; on metoprolol soln 25 mg bid   - CXR 3/25 with likely pulm edema, dialyzed daily this week, now  "appears euvolemic and back to EDW  - will continue to gently challenge EDW as tolerated     # Nutrition: on [employer] tube feeds     # Anemia 2/2 ESKD  On Venofer 50 qwk, Mircera last dose 1/9/2024  - hgb downdrifting, 8's  - Will continue Venofer 50 mcg q week (Tuesday)  - continue epogen 8000 units via HD    # BMD:   - Ca 8-9's, phos 4.1, alb 3.4  - sevelamer on hold         Recommendations were communicated to primary team via note     Rayna Bear Boland MD   Division of Renal Disease and Hypertension  [phone]    Interval History :   Seen bedside, dialyzed daily this week, now euvolemic. Care conference yesterday, decided to do weaning/ bipap support, no trach for now. No n/v, CP, chills.      Review of Systems:   4 point ROS neg other than as noted above    Physical Exam:   I/O last 3 completed shifts:  In: 1590 [I.V.:215; NG/GT:640]  Out: -    BP (!) 147/87   Pulse 74   Temp 97.2  F (36.2  C) (Axillary)   Resp 17   Ht 1.57 m (5' 1.81\")   Wt 44.9 kg (98 lb 15.8 oz)   SpO2 94%   BMI 18.22 kg/m       GENERAL APPEARANCE: on vent, alert and awake  PULM: diminished, on vent  CV: RRR    trace no peripheral  GI: soft   INTEGUMENT: no cyanosis, no rashes on exposed skin  NEURO: a/o3  Access Left AVG     Labs:   All labs reviewed by me  Electrolytes/Renal -   Recent Labs   Lab Test 03/30/24  0825 03/30/24  0336 03/30/24  0332 03/29/24  0905 03/29/24  0313 03/28/24  0455 03/28/24  0451   NA  --  138  --   --  141  --  141   POTASSIUM  --  3.8  --   --  3.5  --  3.4   CHLORIDE  --  98  --   --  101  --  101   CO2  --  26  --   --  28  --  27   BUN  --  48.7*  --   --  25.8*  --  49.2*   CR  --  3.25*  --   --  2.16*  --  3.22*   * 113* 115*   < > 121*   < > 110*   ESTUARDO  --  9.3  --   --  9.4  --  8.9   MAG  --  1.9  --   --  1.8  --  2.0   PHOS  --  5.9*  --   --  4.1  --  5.7*    < > = values in this interval not displayed.       CBC -   Recent Labs   Lab Test 03/30/24  0336 03/29/24  0313 " 03/28/24  0451   WBC 5.2 5.0 4.7   HGB 8.7* 8.9* 8.1*    176 180       LFTs -   Recent Labs   Lab Test 03/30/24  0336 03/29/24  0313 03/28/24  0451   ALKPHOS 92 94 85   BILITOTAL 0.3 0.3 0.3   ALT 14 13 12   AST 18 21 16   PROTTOTAL 5.7* 5.7* 5.2*   ALBUMIN 3.5 3.4* 3.2*       Iron Panel -   Recent Labs   Lab Test 09/26/22  0555 09/03/22  1039 08/24/22  0810   IRON 54 21* 41   IRONSAT 22 9* 21   CARLOS 769* 343* 334*         Imaging:  All imaging studies reviewed by me.     Current Medications:   acetylcysteine  2 mL Nebulization 4x daily    amiodarone  400 mg Oral BID    apixaban ANTICOAGULANT  2.5 mg Oral BID    calcium carbonate-vitamin D  1 tablet Per J Tube TID w/meals    chlorhexidine  15 mL Mouth/Throat Q12H    cloNIDine  0.1 mg Oral At Bedtime    [Held by provider] cyanocobalamin  500 mcg Per Feeding Tube Daily    [START ON 3/31/2024] cycloSPORINE modified  125 mg Per G Tube BID IS    glycopyrrolate  2 mg Oral BID    heparin lock flush  5-20 mL Intracatheter Q24H    levalbuterol  1.25 mg Nebulization 4x Daily    levothyroxine  25 mcg Oral QAM AC    lidocaine  1 patch Transdermal Q24h    liothyronine  2.5 mcg Oral BID    metoprolol tartrate  25 mg Per J Tube BID    midodrine  10 mg Oral 3 times per day on Tuesday Thursday Saturday    OLANZapine zydis  5 mg Oral At Bedtime    oxymetazoline  2 spray Both Nostrils BID    pantoprazole  40 mg Per J Tube BID    piperacillin-tazobactam  2.25 g Intravenous Q6H    predniSONE  5 mg Per J Tube QAM    And    predniSONE  2.5 mg Per J Tube QPM    [Held by provider] sevelamer carbonate (RENVELA)  0.8 g Oral BID    [Held by provider] sodium bicarbonate  1,300 mg Oral or Feeding Tube TID    sodium chloride (PF)  10 mL Intracatheter Q8H    sodium chloride (PF)  10-40 mL Intracatheter Q8H    sulfamethoxazole-trimethoprim  1 tablet Oral Q Mon Wed Fri AM      dextrose      - MEDICATION INSTRUCTIONS -      - MEDICATION INSTRUCTIONS -       Rayna Bear Boland MD

## 2024-03-30 NOTE — PLAN OF CARE
ICU End of Shift Summary. See flowsheets for vital signs and detailed assessment.    Changes this shift: Pt changed to 5/5, still pulling lower volumes, but per team gas was stable and OK to leave for the day and back to 8/5 overnight. HD run today 3L off, was hypertensive during run and began alarming more for low volumes, changed back to 8/5 and improved. Family visited today and pt was very happy to see them.     Plan: Ongoing goals of care discussions for pt and family and reconvene Monday.       Goal Outcome Evaluation:      Plan of Care Reviewed With: patient    Overall Patient Progress: no changeOverall Patient Progress: no change    Outcome Evaluation: See note

## 2024-03-30 NOTE — PROGRESS NOTES
Pulmonary Medicine  Cystic Fibrosis - Lung Transplant Team  Progress Note  2024     Patient: Sofie Rodriguez  MRN: [id number]  : 1960 (age 63 year old)  Transplant: 2022 (Lung), POD#641  Admission date: 2/10/2024    Assessment & Plan:     Sofie Rodriguez is a 63 year old female with h/o COPD s/p BSLT () with course complicated by post-operative hemidiaphragm palsy, recurrent PNAs, positive DSA, EBV viremia, hypogammaglobulinemia, severe gastroparesis s/p G/J tube placement (22) and pyloric botox (23), GI bleed 2/2 pyloric ulcer, hemobilia s/p ERCP and MRCP, chronic diarrhea, recurrent C diff colitis, ESRD on iHD, recurrent falls with injuries (recent right hip fx s/p ORIF 2023), deconditioning, and FTT.  Admitted 2/10 for failure to thrive and initiation of TPN/lipids.  Emergent intubation - for hypoxic and hypercapneic respiratory failure and encephalopathy. Returned to ICU - and again 3/18-3/20 d/t tenuous respiratory status complicated by variable daytime NIPPV tolerance and hypervolemia with iHD limited d/t hypotension. Again transferred back to ICU 3/24 for intubation and bronch/BAL given hypoxic and hypercapneic respiratory failure. CT with extensive bilateral infiltrate and etiology likely multifactorial including volume overload and infection, possible mucous plugging, ACR or AMR less likely. Hypoxia improving, hypercapnia stable on daily VBG, tolerating ventilator weaning.       Today's recommendations:  - Ventilator management and pressure support trials per MICU, daily VBG  - Follow pending BAL and Blood cultures  - ABX: Zosyn through  for 10 day empiric course  - Continue aggressive airway clearance with Volera (intrapulmonary percussion) QID and nebs: Xopenex and Mucomyst QID  - Cyclosporine level today supratherapeutic, HOLD dose tonight and resume tomorrow at reduced dose.  Repeat level  (ordered)  - Repeat DSA monthly (due )  - Volume  removal and dialysis per nephrology; planning for T/T/S iHD and monitor tolerance     Acute on chronic hypoxic/hypercapneic respiratory failure:  S/p bilateral sequential lung transplant for COPD:   Hypoventilation, Suspected CARLEE:  PFTs in clinic remained significantly below her baseline.  Baseline hypoxia with 2L NC overnight PTA.  S/p intubation 2/17-2/19 for acute hypoxic/hypercapneic respiratory failure with encephalopathy, CT with concern for infection and pulmonary edema given recent addition of TPN nutrition.  Bronch (2/18) with very friable tissue, cutlures only with C. glabrata.  Persistent respiratory failure also complicated by hypoventilation and deconditioning d/t malnutrition. Neurology consulted 2/27, MRI brain (3/1) without acute intracranial pathology.  SNIFF (3/8) normal.  Metabolic CART suggested overfeeding on TPN.  Returned to ICU again 3/18-3/20 d/t tenuous respiratory status complicated by NIPPV intolerance and hypervolemia with iHD limited d/t hypotension (CXR with increased pulmonary edema).  Overall, her PCO2 retention is likely multifactorial with a component being from overfeeding (though remedied with nutrition adjustment), metabolic compensation barrier d/t renal failure, pulmonary edema, bicarb loss with diarrhea, hypoventilation with declining NIF and FVC concerning for neuromuscular etiology (though Neurology consult was not revealing), and NIPPV intolerance due to claustrophobia. Worsening hypoxic and hypercapneic respiratory failure 3/24 and transferred to ICU for intubation. CT chest with extensive bilateral infiltrates markedly increased from previous. Bronch with small L>R sided secretions easily suctioned, BAL with no evidence of DAH, non bloody. Etiology likely multifactorial including volume overload and infection, ACR or AMR less likely (as below).  Hypoxia improving, hypercapnia stable on daily VBG, tolerating pressure support trials all day and weaning of pressure support  (7/5 30% on 3/30).  - Ventilator management and pressure support trials per MICU, daily VBG (Goal PCO2 55-65 per ).   - BAL (3/24) bacterial and fungal cultures NGTD, follow (Legionella and galactomannan negative)  - ABX: Continue Zosyn (3/23-4/2) for 10 day empiric course.  Vancomycin 3/23-3/25 (MRSA swab negative); s/p micafungin 100 mg x1 3/23  - Blood culture (3/24) NGTD  - Continue aggressive airway clearance with Volera (intrapulmonary percussion) QID and nebs: Xopenex and Mucomyst QID  - Nasal pillow with BiPAP after discharge to help with tolerance to overnight BiPAP (unable to use with hospital equipment), RN TXPT CC assisting with obtaining BiPAP machine for home use and deliver to hospital for patient use to ensure tolerance with nasal pillow prior to discharge (pending dispo plan)     Immunosuppression:  AZA previously stopped 5/2023 d/t low ImmuKnow assay and EBV viremia.  ImmuKnow (2/27) remained low at 88.  Transitioned from [place] to CSA 3/11.  -  BID (decreased 3/27).  Goal 140-170. Repeat level 3/30 supratherapeutic at 250, HOLD dose tonight and resume tomorrow at reduced dose of 125 mg BID.  Repeat level 4/2 (ordered)  - Prednisone 5 mg qAM / 2.5 mg qPM     Prophylaxis:   - Bactrim qMWF for PJP ppx (3/13, prior dapsone through 3/11)  - CMV ppx not currently indicated, D+/R+, CMV negative 3/18     Positive DSA: AMR treatment deferred given frailty and stable PFTs.  DSA DQB2 decreased on 3/6 with 5667 mfi, and again decreased to 4960 on 3/23.  Most recent cell-free DNA (2/18) mildly elevated at 1.04 (concerning for possible rejection), which was increased from prior level of 0.12 (1/10).  IST increase deferred at that time per Dr. Gong.  S/p IVIG (2/27) for DSA (IgG WNL). Immuknow was 108 (1/10) and 88 on 2/27/24.  - Repeat DSA monthly (due 4/23)  - Prospera (Cell Free DNA) 0.44 (3/18) suggests AMR less likely, follow      EBV viremia: CT CAP (2/7) without lymphadenopathy.  Recent  levels improving (3/18) 23k.  - Repeat EBV in one month (~4/18)     Other relevant problems being managed by the primary team:      FTT:  Severe protein calorie malnutrition:  Gastroparesis s/p PEG/J, botox, and G-POEM:  SB hypomotility:  Pyloric ulcer:  Chronic nausea and osmotic diarrhea:  SIBO s/p rifaximin:   Recurrent C diff colitis: Chronic osmotic and infectious diarrhea since transplant with recurrent episodes of C diff.  Notable weight loss (40# in a year) d/t diarrhea, GI dysmotility, and intolerance of enteral feeds (PEG/J tube in place), most recently on elemental formula.  Extensive OP eval and f/u with GI. S/p port placement for TPN and lipids. Continued for ~5 weeks inpatient without considerable improvement. TPN also not good long term option for home and attempting to resume tube feeds.   - Continuing tube feeds, tolerating thus far  - Persistent acidosis as above, metabolic cart showed likely overfeeding on TPN, will consider repeating when able now that back on tube feeds  - C diff positive (3/13), on fidaxomicin.     ESRD: CT with volume overload secondary to TPN volume, iHD increased from PTA TThSa schedule with unsustained improvement.  Management per Nephrology, dialysis via [facility].  Unable to remove fluid on 3/23 d/t hypotension.  - Volume removal and dialysis per nephrology: T/T/S schedule with extra runs 3/24 and 3/25, now tolerating volume removal with scheduled midodrine on HD days     Goals of care: Care conference held with palliative and primary team on 3/15 for medical update with pt. and daughters.  Comfort cares discussed but pt. declining at this time.  Palliative following (our team discussed at length with palliative provider 3/19).  Care conference 3/29 (see palliative and MICU notes) patient would like to proceed with: plan for extubation when able, hope to tolerate some bipap, if doesn't go well then reintubate with trach OR pursue comfort measures. Pt and her family are  still thinking about overall what they would do if bipap were not to work or she wouldn't tolerate it.       We appreciate the excellent care provided by the MICU team.  Recommendations communicated via this note.  Will continue to follow along closely, please do not hesitate to call with any questions or concerns.    Patient discussed with Dr. Jason Grayson, APRN, CNP   Inpatient Nurse Practitioner  Pulmonary CF/Transplant     Subjective & Interval History:     Tolerating PS today at 7/5 30% with TV ~250mL, improved from 8/5 yesterday (did not tolerate 7/5 d/t low TV).  Breathing is comfortable. Notices ANAYA with any activity, recovers easily.  Gradually decreasing sputum production, still thick and creamy. Denies pain. Stools remain watery, x6 yesterday. Tolerating TF at goal. Up to chair with PT.    Review of Systems:     C: No fever, no chills  INTEGUMENTARY/SKIN: No rash or obvious new lesions  ENT/MOUTH: No sore throat, no sinus pain, no nasal congestion or drainage  RESP: See interval history  CV: No chest pain, no palpitations, no peripheral edema  GI: No nausea, no vomiting, no reflux symptoms  : No dysuria  MUSCULOSKELETAL: No myalgias, no arthralgias  ENDOCRINE: Blood sugars with adequate control  NEURO: No headache  PSYCHIATRIC: Mood stable    Physical Exam:     All notes, images, and labs from past 24 hours (at minimum) were reviewed.    Vital signs:  Temp: 97.3  F (36.3  C) Temp src: Axillary BP: (!) 145/80 Pulse: 64   Resp: 19 SpO2: 100 % O2 Device: Mechanical Ventilator Oxygen Delivery: 15 LPM   Weight: 44.9 kg (98 lb 15.8 oz)  I/O:   Intake/Output Summary (Last 24 hours) at 3/30/2024 1256  Last data filed at 3/30/2024 1200  Gross per 24 hour   Intake 1620 ml   Output --   Net 1620 ml     Constitutional: lying in bed, in no apparent distress.   HEENT: Eyes with pink conjunctivae, anicteric.    Orally intubated via ETT   PULM: Good air flow bilaterally.  + crackles t/o, no rhonchi, no  wheezes.  Non-labored breathing on PS  CV: Normal S1 and S2.  RRR.  No murmur, gallop, or rub.  No peripheral edema.   ABD: NABS, soft, nontender, nondistended.    MSK: Moves all extremities.  + muscle wasting.   NEURO:  Alert and conversant by gestures and writing   SKIN: Warm, dry.  No rash on limited exam.   PSYCH: Mood stable.     Data:     LABS    CMP:   Recent Labs   Lab 03/30/24  0825 03/30/24  0336 03/30/24  0332 03/29/24  2335 03/29/24  0905 03/29/24  0313 03/28/24  0455 03/28/24  0451 03/27/24  0827 03/27/24  0328   NA  --  138  --   --   --  141  --  141  --  138   POTASSIUM  --  3.8  --   --   --  3.5  --  3.4  --  3.7   CHLORIDE  --  98  --   --   --  101  --  101  --  99   CO2  --  26  --   --   --  28  --  27  --  29   ANIONGAP  --  14  --   --   --  12  --  13  --  10   * 113* 115* 118*   < > 121*   < > 110*   < > 120*   BUN  --  48.7*  --   --   --  25.8*  --  49.2*  --  29.2*   CR  --  3.25*  --   --   --  2.16*  --  3.22*  --  1.97*   GFRESTIMATED  --  16*  --   --   --  25*  --  16*  --  28*   ESTUARDO  --  9.3  --   --   --  9.4  --  8.9  --  8.8   MAG  --  1.9  --   --   --  1.8  --  2.0  --  1.8   PHOS  --  5.9*  --   --   --  4.1  --  5.7*  --  3.9   PROTTOTAL  --  5.7*  --   --   --  5.7*  --  5.2*  --  5.2*   ALBUMIN  --  3.5  --   --   --  3.4*  --  3.2*  --  3.1*   BILITOTAL  --  0.3  --   --   --  0.3  --  0.3  --  0.4   ALKPHOS  --  92  --   --   --  94  --  85  --  93   AST  --  18  --   --   --  21  --  16  --  17   ALT  --  14  --   --   --  13  --  12  --  13    < > = values in this interval not displayed.     CBC:   Recent Labs   Lab 03/30/24  0336 03/29/24  0313 03/28/24  0451 03/27/24  0328   WBC 5.2 5.0 4.7 5.5   RBC 2.51* 2.60* 2.37* 2.60*   HGB 8.7* 8.9* 8.1* 8.9*   HCT 28.6* 29.8* 27.0* 29.6*   * 115* 114* 114*   MCH 34.7* 34.2* 34.2* 34.2*   MCHC 30.4* 29.9* 30.0* 30.1*   RDW 16.9* 17.0* 17.4* 17.2*    176 180 191       INR:   Recent Labs   Lab 03/30/24  0336  "03/29/24  0313 03/28/24  0451 03/27/24  0328   INR 1.24* 1.18* 1.25* 1.24*       Glucose:   Recent Labs   Lab 03/30/24  0825 03/30/24  0336 03/30/24  0332 03/29/24  2335 03/29/24  1937 03/29/24  1634   * 113* 115* 118* 101* 125*       Blood Gas:   Recent Labs   Lab 03/30/24  0336 03/29/24  1632 03/29/24  0313   PHV 7.30* 7.32 7.35   PCO2V 60* 59* 59*   PO2V 47 43 44   HCO3V 30* 31* 32*   LINDY 2.5 3.7* 5.6*   O2PER 30 30 30       Culture Data No results for input(s): \"CULT\" in the last 168 hours.    Virology Data:   Lab Results   Component Value Date    FLUAH1 Not Detected 03/24/2024    FLUAH3 Not Detected 03/24/2024    [id number] Not Detected 03/24/2024    IFLUB Not Detected 03/24/2024    RSVA Not Detected 03/24/2024    RSVB Not Detected 03/24/2024    PIV1 Not Detected 03/24/2024    PIV2 Not Detected 03/24/2024    PIV3 Not Detected 03/24/2024    HMPV Not Detected 03/24/2024       Historical CMV results (last 3 of prior testing):  Lab Results   Component Value Date    CMVQNT Not Detected 03/18/2024    CMVQNT Not Detected 02/19/2024    CMVQNT Not Detected 02/07/2024     Lab Results   Component Value Date    CMVLOG 3.2 07/12/2023    CMVLOG <2.1 04/19/2023    CMVLOG 3.5 01/25/2023       Urine Studies    Recent Labs   Lab Test 02/18/24  0222 05/18/23  0627   URINEPH 7.5* 5.0   NITRITE Negative Negative   LEUKEST Trace* Moderate*   WBCU 66* 21*       Most Recent [place] Pulmonary Function Testing (FVC/FEV1 only)  FVC-Pre   Date Value Ref Range Status   02/07/2024 1.19 L    01/10/2024 1.12 L    08/29/2023 1.48 L    07/25/2023 1.55 L      FVC-%Pred-Pre   Date Value Ref Range Status   02/07/2024 42 %    01/10/2024 39 %    08/29/2023 53 %    07/25/2023 55 %      FEV1-Pre   Date Value Ref Range Status   02/07/2024 1.13 L    01/10/2024 1.10 L    08/29/2023 1.43 L    07/25/2023 1.54 L      FEV1-%Pred-Pre   Date Value Ref Range Status   02/07/2024 51 %    01/10/2024 49 %    08/29/2023 64 %    07/25/2023 69 %  "       IMAGING    Recent Results (from the past 48 hour(s))   XR Chest Port 1 View    Narrative    Exam: XR CHEST PORT 1 VIEW  3/29/2024 6:39 AM     History:  Pulmonary eval with prolonged ventilator weaning       Comparison:  3/25/2024    Findings: Single view of the chest. Postsurgical changes of the chest  with intact clamshell sternotomy wires. Endotracheal tube tip projects  over the thoracic trachea. Right upper extremity PICC tip projects  over the superior cavoatrial junction.     Stable cardiomediastinal silhouette. Similar small left pleural  effusion. No pneumothorax. Overall improved interstitial and airspace  opacities of the lungs. Residual retrocardiac opacity.      Impression    Impression: Underlying bilateral lung transplantation   1. Decreased bilateral diffuse pulmonary opacities.  2. Similar small left pleural effusion and overlying left basilar  atelectasis.  3. Stable support devices.    I have personally reviewed the examination and initial interpretation  and I agree with the findings.    ALVINA HARRY MD         SYSTEM ID:  [id number]

## 2024-03-30 NOTE — PROGRESS NOTES
MEDICAL ICU PROGRESS NOTE  03/30/2024      Date of Service (when I saw the patient): 03/30/2024    ASSESSMENT: Sofie Rodriguez is a 63 year old female with PMH COPD s/p bilateral lung transplant 6/28/22 c/b hemidiaphragm palsy and recurrent pneumonias, gastroparesis and small bowel dysmotility complicated by severe malnutrition now s/p PEG/J, ESRD on M/W/F HD, recent R femoral fx s/p ORIF, chronic diarrhea, recurrent c-diff, failure to thrive with inability to tolerate any tube feeds, who was admitted to [facility] on 2/10/24 for concerns over malnutrition and TPN initiation via portacath. Course complicated by recurrent and progressive acute on chronic hypoxic and hypercarbic respiratory failure requiring multiple ICU admissions and intubation 2/18-2/19, 2/28-2/29, 3/18-3/20, for continuous BiPAP. Again with worsening respiratory acidosis and hypercarbia, concern for infection vs acute rejection, requiring transfer back to ICU 3/20/2024 for intubation and bronchoscopy.    CHANGES and MAJOR THINGS TODAY:   - Possible iHD run today   - Continue PST with 7-8/5 as long as tolerated   - Discontinued low dose sliding scale insulin   - Robinul 2 mg BID for 4 doses added      PLAN:     Neuro:  # Acute pain  # Sedation  Intubated and awake, alert. Denies new or acute pain. Uses lidocaine and heating pads as needed for pain management.   - Precedex weaned off   - RASS goal 0 to -1  - Tylenol Q4 prn  - Lidocaine patch prn  - Heating pads prn    # Hx of Anxiety   # Claustrophobia  Reports claustrophobia contributing to limited compliance with BiPAP. Has seen health psych on 3/20, recommended grounding exercise (5-4-3-2-1) for use on BiPAP.   - Zyprexa 5mg at bedtime   - Atarax 25 mg TID PRN for anxiety  - Ativan 0.5 mg PRN for anxiety  - Clonidine 0.1 mg at bedtime     # B/L Neuropathic Pain   New pain b/l this hospitalization. Last A1c <4.2 (7/11/23). Consider possibly 2/2 ESRD. TSH wnl. B12 and B1 elevated, B6  normal. Copper low (56.3), zinc mildly low (48.3).  B3 in process.   - Consider gabapentin in the coming days   - Neuro Recs:  - No obvious clinical history or exam findings to suggest neurologic/neuromuscular causes of respiratory weakness  - Neuropathy labs currently pending  - Holding B12 supplement (supra-therapeutic 3/15)      Pulmonary:  # Acute on chronic hypoxic and hypercarbic respiratory failure  # Recurrent PNAs, concern for acute infection vs acute rejection  # S/p BSLT 6/8/22 for COPD  # R hemidiaphragm palsy   # Positive DSA   # Suspected CARLEE  # PTA nocturnal O2, 2L   S/p bilateral lung transplant 6/28/22 for COPD. Was admitted 2/10 to address malnutrition and admitted to ICU on 2/17 with hypoxic hypercarbic respiratory failure. Intubated on 2/18 due to worsening oxygen requirements, likely due to pulmonary edema given hx of ESRD requiring HD vs infection given hx of lung transplant, immunosuppressed status, and recurrent pneumonias. Required intubation 2/17 - 2/19. Ongoing respiratory acidosis despite BiPAP/AVAPS, claustrophobic while using to intermittent compliance. Neurology consulted and MRI r/o central cause problem for respiratory drive. Started on AVAPS with improvement in mental status and VBG, and patient transferred back to medicine floor on 2/29. 3/08 SNIFF with no definite paradoxical movement of bilateral hemidiaphragm. Transferred back to ICU 3/18-3/20 d/t hypercarbia, severe respiratory acidosis, but did not require intubation during this time. Issues with anxiety/claustrophobia while using the mask, Atarax has helped though Zyprexa led to hallucinations. Patient's baseline appears to be with pH mid 7.2s and pCO2  mid 70-80s at best. Even with pH this low, and pCO2 that high, mentation remains stable. CT chest 3/24 w/ scattered opacity throughout all lungs; dilated pulm artery. On BiPAP 16/5 at time of transfer. Transplant pulm concerned for infectious vs acute rejection picture,  recommending intubation + bronchoscopy. Volume overload + pulmonary edema complicating picture, as patient has had low pressures limiting HD runs.   - Daily VBG  - Intubated on 03/24   - Transplant pulm following, appreciate recs               - 3/18 prospera in process  - Immunosuppression:   >Prednisone 5 mg every morning, 2.5 mg every afternoon  >Cyclosporine 200mg BID (Stopped tacrolimus 3/10)   >Overnight oximetry study suggestive of O2 vs CPAP need, as did require up to 2LPM overnight to prevent hypoxia  >Try to minimize O2 to preserve respiratory drive, will give IVIG for DSA+   >D/c'd theophylline 3/3/24 due to difficulty attaining therapeutic levels (started by ICU), could consider Modafinil   >Repeat metabolic CART study ordered by Transplant Pulm   - Airway clearance/nebs:   - Xopenex neb BID  - Hypertonic saline nebs q 3 hours PRN   - Volume removal with iHD               - Will need to place lines for CRRT if unable to run UF  - Sleep clinic eval at discharge for suspected CARLEE  - Limit medications that would depress respiration  - Continue PST 7-8/5 as long as tolerated   - Robinul 2 mg BID for 4 doses added for excessive oral secretions    Vent Mode: CPAP/PS  (Continuous positive airway pressure with Pressure Support) (pt continues breathing comfortably on PS)  FiO2 (%): 30 %  PEEP (cm H2O): 5 cmH2O  Pressure Support (cm H2O): 7 cmH2O  Resp: 18    # Goals of Care  - 3/15 Care conference on with Transplant Pulm, Pall Care, Medicine, daughters (Julia Nash) and Transplant SW. Overall medical updates provided and Qs answered. With declining respiratory acidosis on labs, discussed code status 3/18. Patient initially not desiring any escalation of her care to ICU/intubation with frustration re overall course. However, after discussing with her daughter on the phone she and family elected to remain full code and agreed to ICU admission and intubation if necessary.   - 3/29 Care conference: Laid out a few  options for family and patient to consider with qs answered. Examples being we could attempt to extubate to BiPAP but plan should be established prior to extubation that if patient decompensates and requires reintubation then likely pursue trach versus more comfort approach. Other option is going straight for trach now. Patient and family (daughter Julia and son present on Ipad) considering the options and had a lot of questions about trach and what that would look like long-term, which was laid out for the family and patient.       Cardiovascular:  # A-flutter (recurrent on 3/17)  # A-fib, intermittent  # HTN  # HFpEF  Noted Afib/flutter intermittently throughout admission. Was started on amiodarone bolus with drip and transitioned to PO dosing.  2/17 TTE: LVEF 55-60%, global RV function normal, no significant valvular abnormalities. Briefly required levophed and midodrine for HD but otherwise stable off pressors.   - MAP goal > 65 mmHg   - Continue Metoprolol tartrate dose 25 mg BID (hold if MAP<65)  - Continue amiodarone 400mg BID PO, can decrease dose to qDay on 04/01  - Midodrine with HD      GI/Nutrition:  # Severe malnutrition   # Failure to thrive  # Hypoalbuminemia  # Gastroparesis, small bowel dysmotility  # S/P PEG/J with intolerance of enteral nutrition  # Chronic osmotic diarrhea/SIBO s/p Rifaximin  # Recurrent C.Diff (3rd ep 3/12/24)    # GERD  Patient with gastroparesis (presumed due to vagal injury) and small bowel dysmotility complicated by unintentional 40lb weight loss over the past year and now severe malnutrition. Previously intolerant to oral food intake due to nausea. Was initially admitted for portacath and TPN initiation since 2/13. Intermittently tolerating feeds without n/v from 2/20.  Per GI, no ongoing concerns for SIBO as of 2/23. As of 3/11 transplant pulmonology is worried that TPN is not a realistic plan to continue at home and would like to transition back to TF (RD oncerned pt is  not absorbing any oral intake). TPN discontinued 3/16. Transplant pulm also worried about mucosal toxicity. Recurrent C.diff 3/12, Fidaxomicin x 10 days (3/13-3/22), with improvement in diarrhea. Transplant pulm requesting repeat colonoscopy w/ Bx after completion of c.diff treatment, to r/o toxicity or other reason that diarrhea is present.   - Nutrition consulted, appreciate assistance  - Continue TF at goal rate 35 ml/hr via PEG/J          - Consider reconsult GI week of 3/25/after metabolic CART study, for future colonoscopy +/- biopsy if diarrhea returns   - PPI BID  - Bowel regimen PRN  - Stool potassium and sodium sent for stool osmotic test to evaluate cause of diarrhea (resent today)   - If concerned regarding osmotic diarrhea, please order stool sodium and stool potassium and utilize these values to calculate the stool osmotic gap using formula: 290 - 2 (Stool Sodium + Stool Potassium).  - An osmotic gap < 50 mOsm/kg is considered normal.  - An osmotic gap > 125 mOsm/kg is consistent with a pure osmotic diarrhea.   - Differential for osmotic diarrhea includes medications (antibiotics, lactulose, antacids, sorbitol, laxative abuse, magnesium ingestion), carbohydrate malabsorption/disaccharidase deficiencies (i.e., lactose intolerance), high ingestion of sugar substitutes or fructose.       Renal/Fluids/Electrolytes:  # ESRD on HD  # Mild hyperphosphatemia  Patient is ESRD on T/Th/Sat HD as outpt, now approx M/W/F inpatient. HD tolerating until 3/23. Briefly required extra Monday runs, not since being off TPN. She would prefer longer sessions to more days.  - Midodrine 10mg q8h with dialysis days   - Currently holding Sevelamer but may need to resume in the coming days per Nephrology   - CBC and CMP daily  - Strict I/Os  - Daily weight   - Renally adjust medications      Endocrine:  # Hyperglycemia, stress induced  - Low dose sliding scale insulin; discontinued   - Hypoglycemia protocol     #  Hypothyroidism  Patient with new (24) low T4 at 0.64 and TSH at 6.5, concerning for new hypothyroidism vs sick thyroid syndrome. TSH with appropriate response, more consistent with elevation in the setting of acute illness. ICU team on  recommended initiation of treatment for possible hypothyroid as this can improve respiratory muscle function in the setting of weakness and malnutrition. 3/7, 3/15, 3/20 repeat thyroid function WNL.  - Continue liothyronine + levothyroxine -- note that prolonged combination therapy is not optimal & regimen should be re-evaluated (likely stop liothyronine, up-titrate levothyroxine) in post-acute setting      ID:  # Recurrent pneumonia, concern for acute infection vs rejection  # Recurrent C.Diff colitis (3rd ep 3/12/24)  # Hx EBV viremia   # Hypogammaglobulinemia  # Chronic immunosuppression  lung transplant  Empirically treated for pneumonia  - , RVP and cultures no growth to date. Recurrent C.Diff Positive 3/12, s/p PO Fidaxomicin 200 mg BID for 10 days (3/13-3/22) with improvement in diarrhea. Remains afebrile, leukocytosis resolved.  Ig, s/p IVIG.  EBV 27K (decreased compared to prior).     - Follow up BAL and blood cultures from 3/24  - Continue empiric antibiotic as below    Antibiotics:  Zosyn ( - , 3/24-current) 10 day course stop date   Bactrim (MWF, PJP ppx, previously on Dapsone)  Vancomycin ( - , 3/24-3/25)  Micafungin (- , 3/24 x1)  Fidaxomicin (3/13-3/22)    Micro:   - BAL 3/24:   - Cell count w diff: PMN 75%, lymphocytes 5, monocytes 19, eos 1  - No organisms seen on Gram stain  - RVP, HSV, Histoplasma neg  - Fungal & bacterial cultures NGTD  - EBV, CMV, PJP, Aspergillus, Legionella, Mycoplasma, AFB in process  - Bcx 3/24 NGTD    Hematology:    #Acute on chronic anemia 2/2 ESRD  Hgb stable, with no acute signs of bleeding.   - Daily CBC  - Transfuse for Hgb < 7  - Continue Epogen with dialysis, held in setting of  concern for acute infection   - Continue Venofer 50 mcg qweek with dialysis, held in setting of concern for acute infection     #Steal physiology of LUE dialysis access fistula  LUE arterial US obtained 2/21 with concern for LUE edema. US of fistula demonstrated steal physiology. Discussed with nephrology, and vascular surgery consulted on 2/22. Vascular surgery has only minor concerns for steal symptoms and given that the AVF is working well, they are okay with outpatient fistulograms/ venoplasty with wrist brachial index and PPG's, unless new concerns arise.  - Plan for outpatient workup as above; nephrology in agreement with outpatient workup.    Musculoskeletal:  # Right hip fracture s/p ORIF (December 2023)   - PT/OT to eval and treat      Skin:  # Left upper extremity unilateral edema, improving   # Bilateral pedal and ankle edema, improving  Edema due to third spacing due to hypoalbuminemia vs HF. BNP >60516 at admission, Echo on 2/18 shows EF of 55-60% with collapsible IVC. Extremity edema 2/2 to hypoalbuminemia. Upper limb duplex USG on 2/18 negative for DVT.  USG left arm on 2/21 shows steal physiology and Vascular Surgery consulted (see Heme section). Overall edema in extremities have improved significantly with dialysis.    - Elevation and wrapping of only lower legs as needed and increased UF per nephrology for volume management; no wrapping of left upper extremity with dialysis fistula      General Cares/Prophylaxis:    DVT Prophylaxis: Apixaban  GI Prophylaxis: PPI  Restraints: N/A  Family Communication: Daughters Charity & Julia  Code Status: FULL      Lines/tubes/drains:  - PEG/J  - PICC line in RUE   - PIV x1      Disposition:  - Medical ICU      Patient seen and findings/plan discussed with medical ICU staff, Dr. Rae.    Kalpana Merino, APRN, CNP     Total patient care time includes 35 minutes     Clinically Significant Risk Factors              # Hypoalbuminemia: Lowest albumin = 2.6 g/dL at  2/18/2024  5:13 AM, will monitor as appropriate  # Coagulation Defect: INR = 1.24 (Ref range: 0.85 - 1.15) and/or PTT = N/A, will monitor for bleeding            # Severe Malnutrition: based on nutrition assessment      # Financial/Environmental Concerns: none            ====================================  INTERVAL HISTORY:   NAD. Patient continuing to consider options going forward. Currently hemodynamically stable. Continue with 7/5, intermittently with low lung volumes even when she is awake and sitting up in chair, holding off on reducing pressure support today. Likely iHD run as well.       OBJECTIVE:   1. VITAL SIGNS:   Temp:  [97.7  F (36.5  C)-98.1  F (36.7  C)] 97.7  F (36.5  C)  Pulse:  [62-84] 66  Resp:  [16-25] 18  BP: (109-152)/(52-86) 127/63  FiO2 (%):  [30 %] 30 %  SpO2:  [95 %-100 %] 96 %  Vent Mode: CPAP/PS  (Continuous positive airway pressure with Pressure Support) (pt continues breathing comfortably on PS)  FiO2 (%): 30 %  PEEP (cm H2O): 5 cmH2O  Pressure Support (cm H2O): 7 cmH2O  Resp: 18  PIP 25    2. INTAKE/ OUTPUT:   I/O last 3 completed shifts:  In: 1635 [I.V.:340; NG/GT:560]  Out: -   Net -2106 past 24h    3. PHYSICAL EXAMINATION:  General: awake, alert & oriented, resting in bed, communicating via writing  HEENT: Mucous membranes moist  Neuro: Awake and alert, no focal deficits, moving all extremities to command and spontaneously  Pulm/Resp: Coarse breath sounds bilaterally, diminished on R>L, no accessory muscle use  CV: RRR, S1/S2 without m/r/g; pedal pulses 2+ without LE edema  Abdomen: Soft, non-distended, non-tender  : rectal tube in place  Incisions/Skin: PICC and PEG site without surrounding erythema, no rashes noted    4. LABS:   Venous Blood Gas  Recent Labs   Lab 03/30/24  0336 03/29/24  1632 03/29/24  0313 03/28/24 1920   PHV 7.30* 7.32 7.35 7.38   PCO2V 60* 59* 59* 56*   PO2V 47 43 44 43   HCO3V 30* 31* 32* 33*   LINDY 2.5 3.7* 5.6* 6.5*   O2PER 30 30 30 30     Complete  Blood Count   Recent Labs   Lab 03/30/24 0336 03/29/24 0313 03/28/24 0451 03/27/24  0328   WBC 5.2 5.0 4.7 5.5   HGB 8.7* 8.9* 8.1* 8.9*    176 180 191     Basic Metabolic Panel  Recent Labs   Lab 03/30/24 0336 03/30/24 0332 03/29/24  2335 03/29/24  1937 03/29/24  0905 03/29/24 0313 03/28/24 0455 03/28/24 0451 03/27/24 0827 03/27/24 0328     --   --   --   --  141  --  141  --  138   POTASSIUM 3.8  --   --   --   --  3.5  --  3.4  --  3.7   CHLORIDE 98  --   --   --   --  101  --  101  --  99   CO2 26  --   --   --   --  28  --  27  --  29   BUN 48.7*  --   --   --   --  25.8*  --  49.2*  --  29.2*   CR 3.25*  --   --   --   --  2.16*  --  3.22*  --  1.97*   * 115* 118* 101*   < > 121*   < > 110*   < > 120*    < > = values in this interval not displayed.     Liver Function Tests  Recent Labs   Lab 03/30/24 0336 03/29/24 0313 03/28/24 0451 03/27/24 0328   AST 18 21 16 17   ALT 14 13 12 13   ALKPHOS 92 94 85 93   BILITOTAL 0.3 0.3 0.3 0.4   ALBUMIN 3.5 3.4* 3.2* 3.1*   INR 1.24* 1.18* 1.25* 1.24*     Coagulation Profile  Recent Labs   Lab 03/30/24 0336 03/29/24 0313 03/28/24 0451 03/27/24 0328   INR 1.24* 1.18* 1.25* 1.24*       5. RADIOLOGY:   No results found for this or any previous visit (from the past 24 hour(s)).

## 2024-03-30 NOTE — PROGRESS NOTES
Date: 3/30/2024  Time: 05:50 PM     Data:  Pre Wt:   44.9 kg (Bed Weight)  Desired Wt:   To be established  Post Wt:  42 kg (Bed Weight)  Weight change: - 2.9 kg  Ultrafiltration - Post Run Net Total Removed (mL):  3000 ml  Vascular Access Status: Graft patent  Dialyzer Rinse:  Light; Streaked  Total Blood Volume Processed: 89.8 L   Total Dialysis (Treatment) Time:   3.5 Hrs  Dialysate Bath: K 3, Ca 2.25  Heparin: Heparin: None     Lab:   No  HbsAg - 3/18/24 (Non reactive)  HbsAb - 3/18/24  (Immune)      Interventions:  Dialysis done through Left AV Graft using 15 gauge needles  UF set to 3.3 Liters, accommodating priming and rinse back volumes  ,   Medication administered: See MAR  Received pt from [facility] with 1hr 20mins remaining in HD treatment.  Patient was hemodynamically stable on HD, vital signs stable.   Pt tolerated UF pull of 3.0L net UF.  Treatment has ended safely and  blood is rinsed back completely  Decannulation done post HD, hemostasis is achieved in 10 minutes  Pressure dressing is applied, to be removed after 4-6 hours  Report given to JAYE Rivers.     Assessment:  A/O x 4, calm and cooperative, denies pain  Lung sounds  anterior and lateral BUL,  BLL: Clear ; diminished.  Left arm AV Graft has good thrill and bruit                Plan:    Per Renal team      Reggie Spring, MISAELN, RN  Acute Dialysis RN  [facility]

## 2024-03-30 NOTE — PLAN OF CARE
ICU End of Shift Summary. See flowsheets for vital signs and detailed assessment.    Changes this shift: Patient A+O x4 and verbalizes needs/concerns via writing. Current vent mode CPAP/PS on settings of 30% FiO2/ 7 PS/ 5 PEEP. Pt tolerated settings with occasional low volumes (intermittently pulling low volumes when patient is asleep, but resolves on own quickly). Patient continues to occasionally self suction orally when needed and tolerating with no complaints. Patient c/o aching pain to right hip from previous hip surgery 12/2023 in which lidocaine patch was placed with relief per pt and c/o throat pain, PRN Tylenol given 1x with relief. Pt states feeling anxious and requests for antianxiety med- PRN Atarax given 1x. TF continues at goal rate of 35mL/hr with 30mL Q4H flushes. Two episodes small/brown/loose BM via commode. Patient anuric. Bladder scanned with volume of 2mL and no interventions needed at this time. PICC line in place and still needed per team d/t awaiting on plans next coming days. Writer communicated with patient about concerns with wearing the bipap. Patient continues to stress that she will wear the bipap as needed once the plan has been decided to be extubated.     Plan:  Care of plan continues. Continue to PS. HD during the day. Potentially ask team if want to discontinue BS checks as all BS <140. Notify team of any new changes.    Goal Outcome Evaluation:    Plan of care reviewed with patient.    Outcome Evaluation: VSS. PS 7/5 overnight

## 2024-03-31 LAB
ALBUMIN SERPL BCG-MCNC: 3.4 G/DL (ref 3.5–5.2)
ALP SERPL-CCNC: 81 U/L (ref 40–150)
ALT SERPL W P-5'-P-CCNC: 13 U/L (ref 0–50)
ANION GAP SERPL CALCULATED.3IONS-SCNC: 12 MMOL/L (ref 7–15)
AST SERPL W P-5'-P-CCNC: 17 U/L (ref 0–45)
BASE EXCESS BLDV CALC-SCNC: 5.4 MMOL/L (ref -3–3)
BASOPHILS # BLD AUTO: ABNORMAL 10*3/UL
BASOPHILS # BLD MANUAL: 0.1 10E3/UL (ref 0–0.2)
BASOPHILS NFR BLD AUTO: ABNORMAL %
BASOPHILS NFR BLD MANUAL: 2 %
BILIRUB SERPL-MCNC: 0.3 MG/DL
BUN SERPL-MCNC: 27.4 MG/DL (ref 8–23)
CALCIUM SERPL-MCNC: 8.8 MG/DL (ref 8.8–10.2)
CHLORIDE SERPL-SCNC: 99 MMOL/L (ref 98–107)
CREAT SERPL-MCNC: 2.2 MG/DL (ref 0.51–0.95)
DEPRECATED HCO3 PLAS-SCNC: 29 MMOL/L (ref 22–29)
EGFRCR SERPLBLD CKD-EPI 2021: 25 ML/MIN/1.73M2
EOSINOPHIL # BLD AUTO: ABNORMAL 10*3/UL
EOSINOPHIL # BLD MANUAL: 0.3 10E3/UL (ref 0–0.7)
EOSINOPHIL NFR BLD AUTO: ABNORMAL %
EOSINOPHIL NFR BLD MANUAL: 7 %
ERYTHROCYTE [DISTWIDTH] IN BLOOD BY AUTOMATED COUNT: 16.5 % (ref 10–15)
FERRITIN SERPL-MCNC: 1007 NG/ML (ref 11–328)
GLUCOSE SERPL-MCNC: 130 MG/DL (ref 70–99)
HCO3 BLDV-SCNC: 32 MMOL/L (ref 21–28)
HCT VFR BLD AUTO: 28.9 % (ref 35–47)
HGB BLD-MCNC: 8.8 G/DL (ref 11.7–15.7)
IMM GRANULOCYTES # BLD: ABNORMAL 10*3/UL
IMM GRANULOCYTES NFR BLD: ABNORMAL %
INR PPP: 1.27 (ref 0.85–1.15)
IRON BINDING CAPACITY (ROCHE): 133 UG/DL (ref 240–430)
IRON SATN MFR SERPL: 26 % (ref 15–46)
IRON SERPL-MCNC: 34 UG/DL (ref 37–145)
LYMPHOCYTES # BLD AUTO: ABNORMAL 10*3/UL
LYMPHOCYTES # BLD MANUAL: 0.8 10E3/UL (ref 0.8–5.3)
LYMPHOCYTES NFR BLD AUTO: ABNORMAL %
LYMPHOCYTES NFR BLD MANUAL: 16 %
MAGNESIUM SERPL-MCNC: 1.6 MG/DL (ref 1.7–2.3)
MCH RBC QN AUTO: 34.4 PG (ref 26.5–33)
MCHC RBC AUTO-ENTMCNC: 30.4 G/DL (ref 31.5–36.5)
MCV RBC AUTO: 113 FL (ref 78–100)
MONOCYTES # BLD AUTO: ABNORMAL 10*3/UL
MONOCYTES # BLD MANUAL: 0.5 10E3/UL (ref 0–1.3)
MONOCYTES NFR BLD AUTO: ABNORMAL %
MONOCYTES NFR BLD MANUAL: 10 %
MYELOCYTES # BLD MANUAL: 0.1 10E3/UL
MYELOCYTES NFR BLD MANUAL: 3 %
NEUTROPHILS # BLD AUTO: ABNORMAL 10*3/UL
NEUTROPHILS # BLD MANUAL: 2.9 10E3/UL (ref 1.6–8.3)
NEUTROPHILS NFR BLD AUTO: ABNORMAL %
NEUTROPHILS NFR BLD MANUAL: 62 %
NRBC # BLD AUTO: 0 10E3/UL
NRBC BLD AUTO-RTO: 0 /100
O2/TOTAL GAS SETTING VFR VENT: 30 %
OXYHGB MFR BLDV: 75 % (ref 70–75)
PCO2 BLDV: 60 MM HG (ref 40–50)
PH BLDV: 7.34 [PH] (ref 7.32–7.43)
PHOSPHATE SERPL-MCNC: 4.4 MG/DL (ref 2.5–4.5)
PLAT MORPH BLD: ABNORMAL
PLATELET # BLD AUTO: 204 10E3/UL (ref 150–450)
PO2 BLDV: 44 MM HG (ref 25–47)
POTASSIUM SERPL-SCNC: 3.5 MMOL/L (ref 3.4–5.3)
PROT SERPL-MCNC: 5.6 G/DL (ref 6.4–8.3)
RBC # BLD AUTO: 2.56 10E6/UL (ref 3.8–5.2)
RBC MORPH BLD: ABNORMAL
SAO2 % BLDV: 76.9 % (ref 70–75)
SODIUM SERPL-SCNC: 140 MMOL/L (ref 135–145)
WBC # BLD AUTO: 4.7 10E3/UL (ref 4–11)

## 2024-03-31 PROCEDURE — 94640 AIRWAY INHALATION TREATMENT: CPT

## 2024-03-31 PROCEDURE — 250N000013 HC RX MED GY IP 250 OP 250 PS 637

## 2024-03-31 PROCEDURE — 99233 SBSQ HOSP IP/OBS HIGH 50: CPT | Performed by: INTERNAL MEDICINE

## 2024-03-31 PROCEDURE — 83735 ASSAY OF MAGNESIUM: CPT | Performed by: STUDENT IN AN ORGANIZED HEALTH CARE EDUCATION/TRAINING PROGRAM

## 2024-03-31 PROCEDURE — 94640 AIRWAY INHALATION TREATMENT: CPT | Mod: 76

## 2024-03-31 PROCEDURE — 94799 UNLISTED PULMONARY SVC/PX: CPT

## 2024-03-31 PROCEDURE — 250N000009 HC RX 250

## 2024-03-31 PROCEDURE — 85027 COMPLETE CBC AUTOMATED: CPT

## 2024-03-31 PROCEDURE — 250N000012 HC RX MED GY IP 250 OP 636 PS 637

## 2024-03-31 PROCEDURE — 200N000002 HC R&B ICU UMMC

## 2024-03-31 PROCEDURE — 250N000011 HC RX IP 250 OP 636: Performed by: STUDENT IN AN ORGANIZED HEALTH CARE EDUCATION/TRAINING PROGRAM

## 2024-03-31 PROCEDURE — 94003 VENT MGMT INPAT SUBQ DAY: CPT

## 2024-03-31 PROCEDURE — 999N000157 HC STATISTIC RCP TIME EA 10 MIN

## 2024-03-31 PROCEDURE — 250N000011 HC RX IP 250 OP 636

## 2024-03-31 PROCEDURE — 80053 COMPREHEN METABOLIC PANEL: CPT

## 2024-03-31 PROCEDURE — 82805 BLOOD GASES W/O2 SATURATION: CPT

## 2024-03-31 PROCEDURE — 250N000012 HC RX MED GY IP 250 OP 636 PS 637: Performed by: NURSE PRACTITIONER

## 2024-03-31 PROCEDURE — 85610 PROTHROMBIN TIME: CPT | Performed by: STUDENT IN AN ORGANIZED HEALTH CARE EDUCATION/TRAINING PROGRAM

## 2024-03-31 PROCEDURE — 99233 SBSQ HOSP IP/OBS HIGH 50: CPT | Performed by: NURSE PRACTITIONER

## 2024-03-31 PROCEDURE — 82728 ASSAY OF FERRITIN: CPT | Performed by: INTERNAL MEDICINE

## 2024-03-31 PROCEDURE — 85007 BL SMEAR W/DIFF WBC COUNT: CPT

## 2024-03-31 PROCEDURE — 99233 SBSQ HOSP IP/OBS HIGH 50: CPT | Mod: GC | Performed by: INTERNAL MEDICINE

## 2024-03-31 PROCEDURE — 84100 ASSAY OF PHOSPHORUS: CPT | Performed by: STUDENT IN AN ORGANIZED HEALTH CARE EDUCATION/TRAINING PROGRAM

## 2024-03-31 PROCEDURE — 250N000013 HC RX MED GY IP 250 OP 250 PS 637: Performed by: STUDENT IN AN ORGANIZED HEALTH CARE EDUCATION/TRAINING PROGRAM

## 2024-03-31 PROCEDURE — 83550 IRON BINDING TEST: CPT | Performed by: INTERNAL MEDICINE

## 2024-03-31 RX ORDER — MAGNESIUM SULFATE HEPTAHYDRATE 40 MG/ML
2 INJECTION, SOLUTION INTRAVENOUS ONCE
Status: COMPLETED | OUTPATIENT
Start: 2024-03-31 | End: 2024-03-31

## 2024-03-31 RX ADMIN — LEVOTHYROXINE SODIUM 25 MCG: 0.03 TABLET ORAL at 06:02

## 2024-03-31 RX ADMIN — LEVALBUTEROL HYDROCHLORIDE 1.25 MG: 1.25 SOLUTION RESPIRATORY (INHALATION) at 11:40

## 2024-03-31 RX ADMIN — ACETAMINOPHEN 975 MG: 325 TABLET, FILM COATED ORAL at 21:13

## 2024-03-31 RX ADMIN — Medication 5 ML: at 14:27

## 2024-03-31 RX ADMIN — LEVALBUTEROL HYDROCHLORIDE 1.25 MG: 1.25 SOLUTION RESPIRATORY (INHALATION) at 16:45

## 2024-03-31 RX ADMIN — PIPERACILLIN SODIUM AND TAZOBACTAM SODIUM 2.25 G: 2; .25 INJECTION, POWDER, LYOPHILIZED, FOR SOLUTION INTRAVENOUS at 21:11

## 2024-03-31 RX ADMIN — APIXABAN 2.5 MG: 2.5 TABLET, FILM COATED ORAL at 07:57

## 2024-03-31 RX ADMIN — METOPROLOL TARTRATE 25 MG: 25 TABLET, FILM COATED ORAL at 21:12

## 2024-03-31 RX ADMIN — CYCLOSPORINE 125 MG: 100 SOLUTION ORAL at 18:33

## 2024-03-31 RX ADMIN — LIDOCAINE 4% 1 PATCH: 40 PATCH TOPICAL at 21:11

## 2024-03-31 RX ADMIN — Medication 40 MG: at 07:56

## 2024-03-31 RX ADMIN — PREDNISONE 5 MG: 5 TABLET ORAL at 07:57

## 2024-03-31 RX ADMIN — CALCIUM CARBONATE 600 MG (1,500 MG)-VITAMIN D3 400 UNIT TABLET 1 TABLET: at 18:33

## 2024-03-31 RX ADMIN — GLYCOPYRROLATE 2 MG: 1 TABLET ORAL at 08:00

## 2024-03-31 RX ADMIN — CALCIUM CARBONATE 600 MG (1,500 MG)-VITAMIN D3 400 UNIT TABLET 1 TABLET: at 12:15

## 2024-03-31 RX ADMIN — MAGNESIUM SULFATE HEPTAHYDRATE 2 G: 2 INJECTION, SOLUTION INTRAVENOUS at 06:08

## 2024-03-31 RX ADMIN — Medication 2.5 MCG: at 07:59

## 2024-03-31 RX ADMIN — LEVALBUTEROL HYDROCHLORIDE 1.25 MG: 1.25 SOLUTION RESPIRATORY (INHALATION) at 20:31

## 2024-03-31 RX ADMIN — CLONIDINE HYDROCHLORIDE 0.1 MG: 0.1 TABLET ORAL at 21:12

## 2024-03-31 RX ADMIN — METOPROLOL TARTRATE 25 MG: 25 TABLET, FILM COATED ORAL at 07:57

## 2024-03-31 RX ADMIN — ACETYLCYSTEINE 2 ML: 100 SOLUTION ORAL; RESPIRATORY (INHALATION) at 16:45

## 2024-03-31 RX ADMIN — PIPERACILLIN SODIUM AND TAZOBACTAM SODIUM 2.25 G: 2; .25 INJECTION, POWDER, LYOPHILIZED, FOR SOLUTION INTRAVENOUS at 02:51

## 2024-03-31 RX ADMIN — ACETYLCYSTEINE 2 ML: 100 SOLUTION ORAL; RESPIRATORY (INHALATION) at 11:40

## 2024-03-31 RX ADMIN — CYCLOSPORINE 125 MG: 100 SOLUTION ORAL at 08:00

## 2024-03-31 RX ADMIN — CHLORHEXIDINE GLUCONATE 0.12% ORAL RINSE 15 ML: 1.2 LIQUID ORAL at 21:11

## 2024-03-31 RX ADMIN — APIXABAN 2.5 MG: 2.5 TABLET, FILM COATED ORAL at 21:12

## 2024-03-31 RX ADMIN — GLYCOPYRROLATE 2 MG: 1 TABLET ORAL at 21:15

## 2024-03-31 RX ADMIN — ACETYLCYSTEINE 2 ML: 100 SOLUTION ORAL; RESPIRATORY (INHALATION) at 20:31

## 2024-03-31 RX ADMIN — Medication 2.5 MCG: at 21:18

## 2024-03-31 RX ADMIN — ACETYLCYSTEINE 2 ML: 100 SOLUTION ORAL; RESPIRATORY (INHALATION) at 08:21

## 2024-03-31 RX ADMIN — OXYMETAZOLINE HYDROCHLORIDE 2 SPRAY: 0.05 SPRAY NASAL at 08:00

## 2024-03-31 RX ADMIN — LEVALBUTEROL HYDROCHLORIDE 1.25 MG: 1.25 SOLUTION RESPIRATORY (INHALATION) at 08:21

## 2024-03-31 RX ADMIN — CALCIUM CARBONATE 600 MG (1,500 MG)-VITAMIN D3 400 UNIT TABLET 1 TABLET: at 07:57

## 2024-03-31 RX ADMIN — PIPERACILLIN SODIUM AND TAZOBACTAM SODIUM 2.25 G: 2; .25 INJECTION, POWDER, LYOPHILIZED, FOR SOLUTION INTRAVENOUS at 07:57

## 2024-03-31 RX ADMIN — PIPERACILLIN SODIUM AND TAZOBACTAM SODIUM 2.25 G: 2; .25 INJECTION, POWDER, LYOPHILIZED, FOR SOLUTION INTRAVENOUS at 14:25

## 2024-03-31 RX ADMIN — CHLORHEXIDINE GLUCONATE 0.12% ORAL RINSE 15 ML: 1.2 LIQUID ORAL at 12:15

## 2024-03-31 RX ADMIN — AMIODARONE HYDROCHLORIDE 400 MG: 200 TABLET ORAL at 07:57

## 2024-03-31 RX ADMIN — PREDNISONE 2.5 MG: 2.5 TABLET ORAL at 21:13

## 2024-03-31 RX ADMIN — AMIODARONE HYDROCHLORIDE 400 MG: 200 TABLET ORAL at 21:12

## 2024-03-31 RX ADMIN — Medication 40 MG: at 21:13

## 2024-03-31 RX ADMIN — OLANZAPINE 5 MG: 5 TABLET, ORALLY DISINTEGRATING ORAL at 21:13

## 2024-03-31 ASSESSMENT — ACTIVITIES OF DAILY LIVING (ADL)
ADLS_ACUITY_SCORE: 39

## 2024-03-31 NOTE — PLAN OF CARE
ICU End of Shift Summary. See flowsheets for vital signs and detailed assessment.    Changes this shift: PS 5/5 all day. Pt up in chair most of the day. Transfers with SBA. Loose stools have slowed.     Plan: Switch to PS 8/5 overnight. Revisit GOC tomorrow per CC on Friday.      Goal Outcome Evaluation:      Plan of Care Reviewed With: patient    Overall Patient Progress: no changeOverall Patient Progress: no change    Outcome Evaluation: PS 5/5 today

## 2024-03-31 NOTE — PLAN OF CARE
ICU End of Shift Summary. See flowsheets for vital signs and detailed assessment.    Changes this shift: Patient A+O x4. On CPAP/PS 30% 8/5 and tolerated settings overnight. Mg of 1.6 and replaced with 2g Mg Sulfate this AM.     Plan:  PS back to 5/5 during day if able to. Notify team of any new changes.    Goal Outcome Evaluation:    Plan of Care Reviewed With: patient    Overall Patient Progress: no change    Outcome Evaluation: PS 8/5 overnight

## 2024-03-31 NOTE — PROGRESS NOTES
Pulmonary Medicine  Cystic Fibrosis - Lung Transplant Team  Progress Note  2024     Patient: Sofie Rodriguez  MRN: [id number]  : 1960 (age 63 year old)  Transplant: 2022 (Lung), POD#642  Admission date: 2/10/2024    Assessment & Plan:     Sofie Rodriguez is a 63 year old female with h/o COPD s/p BSLT () with course complicated by post-operative hemidiaphragm palsy, recurrent PNAs, positive DSA, EBV viremia, hypogammaglobulinemia, severe gastroparesis s/p G/J tube placement (22) and pyloric botox (23), GI bleed 2/2 pyloric ulcer, hemobilia s/p ERCP and MRCP, chronic diarrhea, recurrent C diff colitis, ESRD on iHD, recurrent falls with injuries (recent right hip fx s/p ORIF 2023), deconditioning, and FTT.  Admitted 2/10 for failure to thrive and initiation of TPN/lipids.  Emergent intubation - for hypoxic and hypercapneic respiratory failure and encephalopathy. Returned to ICU - and again 3/18-3/20 d/t tenuous respiratory status complicated by variable daytime NIPPV tolerance and hypervolemia with iHD limited d/t hypotension. Again transferred back to ICU 3/24 for intubation and bronch/BAL given hypoxic and hypercapneic respiratory failure. CT with extensive bilateral infiltrate and etiology likely multifactorial including volume overload and infection, possible mucous plugging, ACR or AMR less likely. Hypoxia improving, hypercapnia stable on daily VBG, tolerating ventilator weaning.       Today's recommendations:  - Ventilator management and pressure support trials per MICU, daily VBG.  Recommend caution with extubation until after next iHD run to optimize success and to monitor tolerance of 3x weekly iHD (T/T/S), could discuss with nephrology to temporarily run MWF if ready for extubation tomorrow and still maintain 3x weekly HD  - Follow pending BAL and Blood cultures  - ABX: Zosyn through  for 10 day empiric course  - Continue aggressive airway clearance with  Volera (intrapulmonary percussion) QID and nebs: Xopenex and Mucomyst QID  - Cyclosporine resumed today at reduced dose.  Repeat level 4/3 ordered  - Repeat DSA monthly (due 4/23)     Acute on chronic hypoxic/hypercapneic respiratory failure:  S/p bilateral sequential lung transplant for COPD:   Hypoventilation, Suspected CARLEE:  PFTs in clinic remained significantly below her baseline.  Baseline hypoxia with 2L NC overnight PTA.  S/p intubation 2/17-2/19 for acute hypoxic/hypercapneic respiratory failure with encephalopathy, CT with concern for infection and pulmonary edema given recent addition of TPN nutrition.  Bronch (2/18) with very friable tissue, cutlures only with C. glabrata.  Persistent respiratory failure also complicated by hypoventilation and deconditioning d/t malnutrition. Neurology consulted 2/27, MRI brain (3/1) without acute intracranial pathology.  SNIFF (3/8) normal.  Metabolic CART suggested overfeeding on TPN.  Returned to ICU again 3/18-3/20 d/t tenuous respiratory status complicated by NIPPV intolerance and hypervolemia with iHD limited d/t hypotension (CXR with increased pulmonary edema).  Overall, her PCO2 retention is likely multifactorial with a component being from overfeeding (though remedied with nutrition adjustment), metabolic compensation barrier d/t renal failure, pulmonary edema, bicarb loss with diarrhea, hypoventilation with declining NIF and FVC concerning for neuromuscular etiology (though Neurology consult was not revealing), and NIPPV intolerance due to claustrophobia. Worsening hypoxic and hypercapneic respiratory failure 3/24 and transferred to ICU for intubation. CT chest with extensive bilateral infiltrates markedly increased from previous. Bronch with small L>R sided secretions easily suctioned, BAL with no evidence of DAH, non bloody. Etiology likely multifactorial including volume overload and infection, ACR or AMR less likely (as below).  Hypoxia improving,  hypercapnia stable on daily VBG, tolerating gradual ventilator weaning.  - Ventilator management and pressure support trials per MICU, daily VBG (Goal PCO2 55-65 per ).   - Recommend caution with extubation until after next iHD run to optimize success and to monitor tolerance of 3x weekly iHD (T/T/S), could discuss with nephrology to temporarily run MWF if ready for extubation tomorrow and still maintain 3x weekly HD  - BAL (3/24) bacterial and fungal cultures NGTD, follow (Legionella and galactomannan negative)  - ABX: Continue Zosyn (3/23-4/2) for 10 day empiric course.  Vancomycin 3/23-3/25 (MRSA swab negative); s/p micafungin 100 mg x1 3/23  - Blood culture (3/24) NGTD  - Continue aggressive airway clearance with Volera (intrapulmonary percussion) QID and nebs: Xopenex and Mucomyst QID  - Nasal pillow with BiPAP after discharge to help with tolerance to overnight BiPAP (unable to use with hospital equipment), RN TXPT CC assisting with obtaining machine for home BiPAP vs AVAPS use and deliver to hospital for patient use  (after extubation) to ensure tolerance with nasal pillow prior to discharge (pending dispo plan)     Immunosuppression:  AZA previously stopped 5/2023 d/t low ImmuKnow assay and EBV viremia.  ImmuKnow (2/27) remained low at 88.  Transitioned from [place] to CSA 3/11.  -  BID (dose held 3/30 PM and decreased 3/31).  Goal 140-170.  Repeat level 4/3  - Prednisone 5 mg qAM / 2.5 mg qPM     Prophylaxis:   - Bactrim qMWF for PJP ppx (3/13, prior dapsone through 3/11)  - CMV ppx not currently indicated, D+/R+, CMV negative 3/18     Positive DSA: AMR treatment deferred given frailty and stable PFTs.  DSA DQB2 decreased on 3/6 with 5667 mfi, and again decreased to 4960 on 3/23.  Most recent cell-free DNA (2/18) mildly elevated at 1.04 (concerning for possible rejection), which was increased from prior level of 0.12 (1/10).  IST increase deferred at that time per Dr. Gong.  S/p IVIG (2/27) for  DSA (IgG WNL). Immuknow was 108 (1/10) and 88 on 2/27/24.  - Repeat DSA monthly (due 4/23)  - Prospera (Cell Free DNA) 0.44 (3/18) suggests AMR less likely, follow      EBV viremia: CT CAP (2/7) without lymphadenopathy.  Recent levels improving (3/18) 23k.  - Repeat EBV in one month (~4/18)     Other relevant problems being managed by the primary team:      FTT:  Severe protein calorie malnutrition:  Gastroparesis s/p PEG/J, botox, and G-POEM:  SB hypomotility:  Pyloric ulcer:  Chronic nausea and osmotic diarrhea:  SIBO s/p rifaximin:   Recurrent C diff colitis: Chronic osmotic and infectious diarrhea since transplant with recurrent episodes of C diff.  Notable weight loss (40# in a year) d/t diarrhea, GI dysmotility, and intolerance of enteral feeds (PEG/J tube in place), most recently on elemental formula.  Extensive OP eval and f/u with GI. S/p port placement for TPN and lipids. Continued for ~5 weeks inpatient without considerable improvement. TPN also not good long term option for home and attempting to resume tube feeds.   - Continuing tube feeds, tolerating thus far  - Persistent acidosis as above, metabolic cart showed likely overfeeding on TPN, will consider repeating when able now that back on tube feeds  - C diff positive (3/13), on fidaxomicin.     ESRD: CT with volume overload secondary to TPN volume, iHD increased from PTA TThSa schedule with unsustained improvement.  Management per Nephrology, dialysis via [facility].  Unable to remove fluid on 3/23 d/t hypotension.  - Volume removal and dialysis per nephrology: T/T/S schedule with extra runs 3/24 and 3/25, now tolerating volume removal with scheduled midodrine on HD days     Goals of care: Care conference held with palliative and primary team on 3/15 for medical update with pt. and daughters.  Comfort cares discussed but pt. declining at this time.  Palliative following (our team discussed at length with palliative provider 3/19).  Care conference  3/29 (see palliative and MICU notes) patient would like to proceed with: plan for extubation when able, hope to tolerate some bipap, if doesn't go well then reintubate with trach OR pursue comfort measures. Pt and her family are still thinking about overall what they would do if bipap were not to work or she wouldn't tolerate it.       We appreciate the excellent care provided by the MICU team.  Recommendations communicated via this note.  Will continue to follow along closely, please do not hesitate to call with any questions or concerns.     Patient discussed with Dr. Jason Grayson, APRN, CNP   Inpatient Nurse Practitioner  Pulmonary CF/Transplant     Subjective & Interval History:     Tolerated continuous PS since yesterday AM and daytime pressure decreased from 7/5 yesterday to 5/5 today,  8/5 overnight. Hypoxia stable on 30%.  Breathing is comfortable. No SOB, ANAYA resolved. Sputum production and cough are improving. Stools decreasing. Tolerating TF at goal. Up to chair with PT.     Review of Systems:     C: No fever, no chills, + weight decreased  INTEGUMENTARY/SKIN: No rash or obvious new lesions  ENT/MOUTH: No sore throat, no sinus pain, no nasal congestion or drainage  RESP: See interval history  CV: No chest pain, no palpitations, no peripheral edema  GI: No nausea, no vomiting, no reflux symptoms  : No dysuria  MUSCULOSKELETAL: No myalgias, no arthralgias  ENDOCRINE: Blood sugars with adequate control  NEURO: No headache  PSYCHIATRIC: Mood stable    Physical Exam:     All notes, images, and labs from past 24 hours (at minimum) were reviewed.    Vital signs:  Temp: 97.8  F (36.6  C) Temp src: Axillary BP: 139/64 Pulse: 67   Resp: 22 SpO2: 92 % O2 Device: Mechanical Ventilator Oxygen Delivery: 15 LPM   Weight: 44.9 kg (98 lb 15.8 oz)  I/O:   Intake/Output Summary (Last 24 hours) at 3/31/2024 1250  Last data filed at 3/31/2024 1200  Gross per 24 hour   Intake 1535 ml   Output 3000 ml   Net  -1465 ml     Constitutional: sitting up in recliner, in no apparent distress.   HEENT: Eyes with pink conjunctivae, anicteric.  Orally intubated via ETT    PULM: Good air flow bilaterally.  + crackles t/o, no rhonchi, no wheezes.  Non-labored breathing on PS   CV: Normal S1 and S2.  RRR.  No murmur, gallop, or rub.  No peripheral edema.   ABD: NABS, soft, nontender, nondistended.    MSK: Moves all extremities.  + muscle wasting.   NEURO: Alert and conversant by gestures and writing   SKIN: Warm, dry.  No rash on limited exam.   PSYCH: Mood stable.     Data:     LABS    CMP:   Recent Labs   Lab 03/31/24  0456 03/30/24  0825 03/30/24  0336 03/30/24  0332 03/29/24  0905 03/29/24  0313 03/28/24  0455 03/28/24  0451     --  138  --   --  141  --  141   POTASSIUM 3.5  --  3.8  --   --  3.5  --  3.4   CHLORIDE 99  --  98  --   --  101  --  101   CO2 29  --  26  --   --  28  --  27   ANIONGAP 12  --  14  --   --  12  --  13   * 101* 113* 115*   < > 121*   < > 110*   BUN 27.4*  --  48.7*  --   --  25.8*  --  49.2*   CR 2.20*  --  3.25*  --   --  2.16*  --  3.22*   GFRESTIMATED 25*  --  16*  --   --  25*  --  16*   ESTUARDO 8.8  --  9.3  --   --  9.4  --  8.9   MAG 1.6*  --  1.9  --   --  1.8  --  2.0   PHOS 4.4  --  5.9*  --   --  4.1  --  5.7*   PROTTOTAL 5.6*  --  5.7*  --   --  5.7*  --  5.2*   ALBUMIN 3.4*  --  3.5  --   --  3.4*  --  3.2*   BILITOTAL 0.3  --  0.3  --   --  0.3  --  0.3   ALKPHOS 81  --  92  --   --  94  --  85   AST 17  --  18  --   --  21  --  16   ALT 13  --  14  --   --  13  --  12    < > = values in this interval not displayed.     CBC:   Recent Labs   Lab 03/31/24  0456 03/30/24  0336 03/29/24  0313 03/28/24  0451   WBC 4.7 5.2 5.0 4.7   RBC 2.56* 2.51* 2.60* 2.37*   HGB 8.8* 8.7* 8.9* 8.1*   HCT 28.9* 28.6* 29.8* 27.0*   * 114* 115* 114*   MCH 34.4* 34.7* 34.2* 34.2*   MCHC 30.4* 30.4* 29.9* 30.0*   RDW 16.5* 16.9* 17.0* 17.4*    180 176 180       INR:   Recent Labs   Lab  "03/31/24  0456 03/30/24  0336 03/29/24  0313 03/28/24  0451   INR 1.27* 1.24* 1.18* 1.25*       Glucose:   Recent Labs   Lab 03/31/24  0456 03/30/24  0825 03/30/24  0336 03/30/24  0332 03/29/24  2335 03/29/24  1937   * 101* 113* 115* 118* 101*       Blood Gas:   Recent Labs   Lab 03/31/24  0456 03/30/24  1418 03/30/24  0336   PHV 7.34 7.31* 7.30*   PCO2V 60* 62* 60*   PO2V 44 47 47   HCO3V 32* 31* 30*   LINDY 5.4* 4.0* 2.5   O2PER 30 30 30       Culture Data No results for input(s): \"CULT\" in the last 168 hours.    Virology Data:   Lab Results   Component Value Date    FLUAH1 Not Detected 03/24/2024    FLUAH3 Not Detected 03/24/2024    QD2179 Not Detected 03/24/2024    IFLUB Not Detected 03/24/2024    RSVA Not Detected 03/24/2024    RSVB Not Detected 03/24/2024    PIV1 Not Detected 03/24/2024    PIV2 Not Detected 03/24/2024    PIV3 Not Detected 03/24/2024    HMPV Not Detected 03/24/2024       Historical CMV results (last 3 of prior testing):  Lab Results   Component Value Date    CMVQNT Not Detected 03/18/2024    CMVQNT Not Detected 02/19/2024    CMVQNT Not Detected 02/07/2024     Lab Results   Component Value Date    CMVLOG 3.2 07/12/2023    CMVLOG <2.1 04/19/2023    CMVLOG 3.5 01/25/2023       Urine Studies    Recent Labs   Lab Test 02/18/24  0222 05/18/23  0627   URINEPH 7.5* 5.0   NITRITE Negative Negative   LEUKEST Trace* Moderate*   WBCU 66* 21*       Most Recent [place] Pulmonary Function Testing (FVC/FEV1 only)  FVC-Pre   Date Value Ref Range Status   02/07/2024 1.19 L    01/10/2024 1.12 L    08/29/2023 1.48 L    07/25/2023 1.55 L      FVC-%Pred-Pre   Date Value Ref Range Status   02/07/2024 42 %    01/10/2024 39 %    08/29/2023 53 %    07/25/2023 55 %      FEV1-Pre   Date Value Ref Range Status   02/07/2024 1.13 L    01/10/2024 1.10 L    08/29/2023 1.43 L    07/25/2023 1.54 L      FEV1-%Pred-Pre   Date Value Ref Range Status   02/07/2024 51 %    01/10/2024 49 %    08/29/2023 64 %    07/25/2023 69 %  "       IMAGING    No results found for this or any previous visit (from the past 48 hour(s)).

## 2024-03-31 NOTE — PROGRESS NOTES
Nephrology Progress Note  03/31/2024         Assessment & Recommendations:   Sofie Rodriguez is a 63 year old year old female with ESKD, COPD s/p b/l lung transplant in 6/2022 with multiple complications, gastroparesis s/p GJ tube, GIB, chronic diarrhea, recurrent c-diff, FTT with inability to tolerate any tube feeds, R hip fx s/p ORIF 12/2023, admitted on 2/10 for initiation of TPN/lipids, transferred to ICU on 2/17 with worsening mental status and respiratory acidosis in spite of bipap, ultimately intubated on 2/18, being treated for PNA, also with volume overload in setting of high volume TPN. Persistent respiratory acidosis in spite of volume off, transferred to ICU for hypotension and respiratory failure, improved on bipap, now floor status again 3/1. Transferred to ICU 3/18 again with worsening hypercapnic respiratory failure. Transferred to floor 3/20, back to ICU 3/24    # ESKD - TTS, LUCUONG AVG, 3hr, 45.5 kg, [facility], Dr. Andres Fairbanks.  - dialyzed daily this week, now back to euvolemia and will continue HD per TTS schedule, extra run if needed.  She would like to go back to 3 hours if possible  - Requires EMLA cream an hour before HD  - please avoid PICC which will compromise future dialysis accesses; a midline is much preferred for this patient    # Persistent respiratory acidosis: when off vent  - difficulty tolerating bipap due to claustrophobia, but wearing this lately  - balancing act between giving bicarb to maintain pH in setting of severe persistent respiratory acidosis with bicarb quickly converting CO2 and worsening overall status  - recommend stopping PO bicarb, ok to restart for pH < 7.1 but would stop again once pH is 7.2; will continue to provide bicarb with dialysis  - transplant pulm following  - re-intubated 3/24 for bronch/BAL (NGTD), status improving with less trach support.    # Aflutter: on amio and BB  # Volume/BP: EDW 45-45.5 kg. Anuric; on metoprolol soln 25 mg bid   - CXR  "3/25 with likely pulm edema, dialyzed daily this week, now appears euvolemic and back to EDW  - will continue to gently challenge EDW as tolerated     # Nutrition: on [employer] tube feeds     # Anemia 2/2 ESKD  On Venofer 50 qwk, Mircera last dose 1/9/2024  - hgb downdrifting, 8's  - check iron sats again- added on  - Will continue Venofer 50 mcg q week (Tuesday)  - continue epogen 8000 units via HD    # BMD:   - Ca 8-9's, phos 4.4, alb 3.4  - sevelamer on hold         Recommendations were communicated to primary team via note     Rayna Bear Boland MD   Division of Renal Disease and Hypertension  [phone]    Interval History :   Seen bedside, dialyzed daily this week, now euvolemic. Care conference- decided to do weaning/ bipap support, no trach for now. No n/v, CP, chills.  Had dialysis yesterday and felt OK with this, 3 liters removed    Review of Systems:   4 point ROS neg other than as noted above    Physical Exam:   I/O last 3 completed shifts:  In: 1705 [I.V.:430; NG/GT:610]  Out: 3000 [Other:3000]   /55   Pulse 66   Temp 98.5  F (36.9  C) (Axillary)   Resp 17   Ht 1.57 m (5' 1.81\")   Wt 44.9 kg (98 lb 15.8 oz)   SpO2 100%   BMI 18.22 kg/m       GENERAL APPEARANCE: on vent, alert and awake  PULM: diminished, on vent  CV: RRR    trace no peripheral  GI: soft   INTEGUMENT: no cyanosis, no rashes on exposed skin  NEURO: a/o3  Access Left AVG     Labs:   All labs reviewed by me  Electrolytes/Renal -   Recent Labs   Lab Test 03/31/24  0456 03/30/24  0825 03/30/24  0336 03/29/24  0905 03/29/24  0313     --  138  --  141   POTASSIUM 3.5  --  3.8  --  3.5   CHLORIDE 99  --  98  --  101   CO2 29  --  26  --  28   BUN 27.4*  --  48.7*  --  25.8*   CR 2.20*  --  3.25*  --  2.16*   * 101* 113*   < > 121*   ESTUARDO 8.8  --  9.3  --  9.4   MAG 1.6*  --  1.9  --  1.8   PHOS 4.4  --  5.9*  --  4.1    < > = values in this interval not displayed.       CBC -   Recent Labs   Lab Test 03/31/24  0456 " 03/30/24  0336 03/29/24  0313   WBC 4.7 5.2 5.0   HGB 8.8* 8.7* 8.9*    180 176       LFTs -   Recent Labs   Lab Test 03/31/24  0456 03/30/24  0336 03/29/24  0313   ALKPHOS 81 92 94   BILITOTAL 0.3 0.3 0.3   ALT 13 14 13   AST 17 18 21   PROTTOTAL 5.6* 5.7* 5.7*   ALBUMIN 3.4* 3.5 3.4*       Iron Panel -   Recent Labs   Lab Test 09/26/22  0555 09/03/22  1039 08/24/22  0810   IRON 54 21* 41   IRONSAT 22 9* 21   CARLOS 769* 343* 334*         Imaging:  All imaging studies reviewed by me.     Current Medications:   acetylcysteine  2 mL Nebulization 4x daily    amiodarone  400 mg Oral BID    apixaban ANTICOAGULANT  2.5 mg Oral BID    calcium carbonate-vitamin D  1 tablet Per J Tube TID w/meals    chlorhexidine  15 mL Mouth/Throat Q12H    cloNIDine  0.1 mg Oral At Bedtime    [Held by provider] cyanocobalamin  500 mcg Per Feeding Tube Daily    cycloSPORINE modified  125 mg Per G Tube BID IS    glycopyrrolate  2 mg Oral BID    heparin lock flush  5-20 mL Intracatheter Q24H    levalbuterol  1.25 mg Nebulization 4x Daily    levothyroxine  25 mcg Oral QAM AC    lidocaine  1 patch Transdermal Q24h    liothyronine  2.5 mcg Oral BID    metoprolol tartrate  25 mg Per J Tube BID    midodrine  10 mg Oral 3 times per day on Tuesday Thursday Saturday    OLANZapine zydis  5 mg Oral At Bedtime    oxymetazoline  2 spray Both Nostrils BID    pantoprazole  40 mg Per J Tube BID    piperacillin-tazobactam  2.25 g Intravenous Q6H    predniSONE  5 mg Per J Tube QAM    And    predniSONE  2.5 mg Per J Tube QPM    [Held by provider] sevelamer carbonate (RENVELA)  0.8 g Oral BID    [Held by provider] sodium bicarbonate  1,300 mg Oral or Feeding Tube TID    sodium chloride (PF)  10 mL Intracatheter Q8H    sodium chloride (PF)  10-40 mL Intracatheter Q8H    sulfamethoxazole-trimethoprim  1 tablet Oral Q Mon Wed Fri AM      dextrose      - MEDICATION INSTRUCTIONS -       Rayna Boland MD

## 2024-04-01 ENCOUNTER — APPOINTMENT (OUTPATIENT)
Dept: OCCUPATIONAL THERAPY | Facility: CLINIC | Age: 64
DRG: 640 | End: 2024-04-01
Attending: INTERNAL MEDICINE
Payer: MEDICARE

## 2024-04-01 ENCOUNTER — APPOINTMENT (OUTPATIENT)
Dept: GENERAL RADIOLOGY | Facility: CLINIC | Age: 64
DRG: 640 | End: 2024-04-01
Attending: NURSE PRACTITIONER
Payer: MEDICARE

## 2024-04-01 LAB
ALBUMIN SERPL BCG-MCNC: 3.5 G/DL (ref 3.5–5.2)
ALP SERPL-CCNC: 80 U/L (ref 40–150)
ALT SERPL W P-5'-P-CCNC: 14 U/L (ref 0–50)
ANION GAP SERPL CALCULATED.3IONS-SCNC: 15 MMOL/L (ref 7–15)
AST SERPL W P-5'-P-CCNC: 14 U/L (ref 0–45)
BASE EXCESS BLDV CALC-SCNC: 4 MMOL/L (ref -3–3)
BASOPHILS # BLD AUTO: ABNORMAL 10*3/UL
BASOPHILS # BLD MANUAL: 0 10E3/UL (ref 0–0.2)
BASOPHILS NFR BLD AUTO: ABNORMAL %
BASOPHILS NFR BLD MANUAL: 1 %
BILIRUB SERPL-MCNC: 0.2 MG/DL
BUN SERPL-MCNC: 47.7 MG/DL (ref 8–23)
CALCIUM SERPL-MCNC: 9 MG/DL (ref 8.8–10.2)
CHLORIDE SERPL-SCNC: 96 MMOL/L (ref 98–107)
CREAT SERPL-MCNC: 3.47 MG/DL (ref 0.51–0.95)
DEPRECATED HCO3 PLAS-SCNC: 27 MMOL/L (ref 22–29)
EGFRCR SERPLBLD CKD-EPI 2021: 14 ML/MIN/1.73M2
EOSINOPHIL # BLD AUTO: ABNORMAL 10*3/UL
EOSINOPHIL # BLD MANUAL: 0.4 10E3/UL (ref 0–0.7)
EOSINOPHIL NFR BLD AUTO: ABNORMAL %
EOSINOPHIL NFR BLD MANUAL: 10 %
ERYTHROCYTE [DISTWIDTH] IN BLOOD BY AUTOMATED COUNT: 16.4 % (ref 10–15)
GLUCOSE BLDC GLUCOMTR-MCNC: 89 MG/DL (ref 70–99)
GLUCOSE SERPL-MCNC: 126 MG/DL (ref 70–99)
HCO3 BLDV-SCNC: 31 MMOL/L (ref 21–28)
HCT VFR BLD AUTO: 28.9 % (ref 35–47)
HGB BLD-MCNC: 8.7 G/DL (ref 11.7–15.7)
IMM GRANULOCYTES # BLD: ABNORMAL 10*3/UL
IMM GRANULOCYTES NFR BLD: ABNORMAL %
INR PPP: 1.29 (ref 0.85–1.15)
LYMPHOCYTES # BLD AUTO: ABNORMAL 10*3/UL
LYMPHOCYTES # BLD MANUAL: 0.6 10E3/UL (ref 0.8–5.3)
LYMPHOCYTES NFR BLD AUTO: ABNORMAL %
LYMPHOCYTES NFR BLD MANUAL: 15 %
MAGNESIUM SERPL-MCNC: 2.4 MG/DL (ref 1.7–2.3)
MCH RBC QN AUTO: 34 PG (ref 26.5–33)
MCHC RBC AUTO-ENTMCNC: 30.1 G/DL (ref 31.5–36.5)
MCV RBC AUTO: 113 FL (ref 78–100)
MONOCYTES # BLD AUTO: ABNORMAL 10*3/UL
MONOCYTES # BLD MANUAL: 0.5 10E3/UL (ref 0–1.3)
MONOCYTES NFR BLD AUTO: ABNORMAL %
MONOCYTES NFR BLD MANUAL: 11 %
NEUTROPHILS # BLD AUTO: ABNORMAL 10*3/UL
NEUTROPHILS # BLD MANUAL: 2.6 10E3/UL (ref 1.6–8.3)
NEUTROPHILS NFR BLD AUTO: ABNORMAL %
NEUTROPHILS NFR BLD MANUAL: 63 %
NRBC # BLD AUTO: 0 10E3/UL
NRBC BLD AUTO-RTO: 0 /100
O2/TOTAL GAS SETTING VFR VENT: 30 %
OXYHGB MFR BLDV: 79 % (ref 70–75)
PCO2 BLDV: 61 MM HG (ref 40–50)
PH BLDV: 7.32 [PH] (ref 7.32–7.43)
PHOSPHATE SERPL-MCNC: 6.1 MG/DL (ref 2.5–4.5)
PLAT MORPH BLD: ABNORMAL
PLATELET # BLD AUTO: 211 10E3/UL (ref 150–450)
PO2 BLDV: 49 MM HG (ref 25–47)
POTASSIUM SERPL-SCNC: 3.5 MMOL/L (ref 3.4–5.3)
PROT SERPL-MCNC: 5.4 G/DL (ref 6.4–8.3)
RBC # BLD AUTO: 2.56 10E6/UL (ref 3.8–5.2)
RBC MORPH BLD: ABNORMAL
SAO2 % BLDV: 81 % (ref 70–75)
SODIUM SERPL-SCNC: 138 MMOL/L (ref 135–145)
WBC # BLD AUTO: 4.1 10E3/UL (ref 4–11)

## 2024-04-01 PROCEDURE — 71045 X-RAY EXAM CHEST 1 VIEW: CPT | Mod: 26 | Performed by: RADIOLOGY

## 2024-04-01 PROCEDURE — 94799 UNLISTED PULMONARY SVC/PX: CPT

## 2024-04-01 PROCEDURE — 250N000013 HC RX MED GY IP 250 OP 250 PS 637

## 2024-04-01 PROCEDURE — 250N000009 HC RX 250

## 2024-04-01 PROCEDURE — 999N000157 HC STATISTIC RCP TIME EA 10 MIN

## 2024-04-01 PROCEDURE — 99233 SBSQ HOSP IP/OBS HIGH 50: CPT | Mod: FS | Performed by: PHYSICIAN ASSISTANT

## 2024-04-01 PROCEDURE — 99207 PR NO BILLABLE SERVICE THIS VISIT: CPT | Performed by: PHYSICIAN ASSISTANT

## 2024-04-01 PROCEDURE — 250N000012 HC RX MED GY IP 250 OP 636 PS 637

## 2024-04-01 PROCEDURE — 250N000013 HC RX MED GY IP 250 OP 250 PS 637: Performed by: STUDENT IN AN ORGANIZED HEALTH CARE EDUCATION/TRAINING PROGRAM

## 2024-04-01 PROCEDURE — 250N000012 HC RX MED GY IP 250 OP 636 PS 637: Performed by: NURSE PRACTITIONER

## 2024-04-01 PROCEDURE — 250N000011 HC RX IP 250 OP 636: Performed by: STUDENT IN AN ORGANIZED HEALTH CARE EDUCATION/TRAINING PROGRAM

## 2024-04-01 PROCEDURE — 71045 X-RAY EXAM CHEST 1 VIEW: CPT

## 2024-04-01 PROCEDURE — 85027 COMPLETE CBC AUTOMATED: CPT

## 2024-04-01 PROCEDURE — 250N000013 HC RX MED GY IP 250 OP 250 PS 637: Performed by: INTERNAL MEDICINE

## 2024-04-01 PROCEDURE — 94003 VENT MGMT INPAT SUBQ DAY: CPT

## 2024-04-01 PROCEDURE — 272N000272 HC CONTINUOUS NEBULIZER MICRO PUMP

## 2024-04-01 PROCEDURE — 80053 COMPREHEN METABOLIC PANEL: CPT

## 2024-04-01 PROCEDURE — 86352 CELL FUNCTION ASSAY W/STIM: CPT | Performed by: NURSE PRACTITIONER

## 2024-04-01 PROCEDURE — 83735 ASSAY OF MAGNESIUM: CPT | Performed by: STUDENT IN AN ORGANIZED HEALTH CARE EDUCATION/TRAINING PROGRAM

## 2024-04-01 PROCEDURE — 99232 SBSQ HOSP IP/OBS MODERATE 35: CPT | Performed by: NURSE PRACTITIONER

## 2024-04-01 PROCEDURE — 250N000011 HC RX IP 250 OP 636

## 2024-04-01 PROCEDURE — 85007 BL SMEAR W/DIFF WBC COUNT: CPT

## 2024-04-01 PROCEDURE — 97530 THERAPEUTIC ACTIVITIES: CPT | Mod: GO

## 2024-04-01 PROCEDURE — 82805 BLOOD GASES W/O2 SATURATION: CPT

## 2024-04-01 PROCEDURE — 85610 PROTHROMBIN TIME: CPT | Performed by: STUDENT IN AN ORGANIZED HEALTH CARE EDUCATION/TRAINING PROGRAM

## 2024-04-01 PROCEDURE — 94640 AIRWAY INHALATION TREATMENT: CPT | Mod: 76

## 2024-04-01 PROCEDURE — 99207 PR NO BILLABLE SERVICE THIS VISIT: CPT

## 2024-04-01 PROCEDURE — 94640 AIRWAY INHALATION TREATMENT: CPT

## 2024-04-01 PROCEDURE — 200N000002 HC R&B ICU UMMC

## 2024-04-01 PROCEDURE — 84100 ASSAY OF PHOSPHORUS: CPT | Performed by: STUDENT IN AN ORGANIZED HEALTH CARE EDUCATION/TRAINING PROGRAM

## 2024-04-01 PROCEDURE — 99291 CRITICAL CARE FIRST HOUR: CPT | Mod: FS

## 2024-04-01 PROCEDURE — 999N000253 HC STATISTIC WEANING TRIALS

## 2024-04-01 RX ORDER — AMIODARONE HYDROCHLORIDE 200 MG/1
200 TABLET ORAL DAILY
Status: DISCONTINUED | OUTPATIENT
Start: 2024-04-02 | End: 2024-04-15 | Stop reason: HOSPADM

## 2024-04-01 RX ADMIN — ACETAMINOPHEN 975 MG: 325 TABLET, FILM COATED ORAL at 09:40

## 2024-04-01 RX ADMIN — PIPERACILLIN SODIUM AND TAZOBACTAM SODIUM 2.25 G: 2; .25 INJECTION, POWDER, LYOPHILIZED, FOR SOLUTION INTRAVENOUS at 20:13

## 2024-04-01 RX ADMIN — Medication 5 ML: at 15:34

## 2024-04-01 RX ADMIN — ACETYLCYSTEINE 2 ML: 100 SOLUTION ORAL; RESPIRATORY (INHALATION) at 08:14

## 2024-04-01 RX ADMIN — CYCLOSPORINE 125 MG: 100 SOLUTION ORAL at 18:29

## 2024-04-01 RX ADMIN — LIDOCAINE 4% 1 PATCH: 40 PATCH TOPICAL at 20:49

## 2024-04-01 RX ADMIN — ACETYLCYSTEINE 2 ML: 100 SOLUTION ORAL; RESPIRATORY (INHALATION) at 17:03

## 2024-04-01 RX ADMIN — LEVALBUTEROL HYDROCHLORIDE 1.25 MG: 1.25 SOLUTION RESPIRATORY (INHALATION) at 17:03

## 2024-04-01 RX ADMIN — ACETAMINOPHEN 975 MG: 325 TABLET, FILM COATED ORAL at 21:15

## 2024-04-01 RX ADMIN — AMIODARONE HYDROCHLORIDE 400 MG: 200 TABLET ORAL at 20:49

## 2024-04-01 RX ADMIN — Medication 2.5 MCG: at 09:02

## 2024-04-01 RX ADMIN — ACETYLCYSTEINE 2 ML: 100 SOLUTION ORAL; RESPIRATORY (INHALATION) at 20:42

## 2024-04-01 RX ADMIN — PREDNISONE 2.5 MG: 2.5 TABLET ORAL at 20:49

## 2024-04-01 RX ADMIN — CYCLOSPORINE 125 MG: 100 SOLUTION ORAL at 09:03

## 2024-04-01 RX ADMIN — Medication 40 MG: at 20:52

## 2024-04-01 RX ADMIN — APIXABAN 2.5 MG: 2.5 TABLET, FILM COATED ORAL at 20:49

## 2024-04-01 RX ADMIN — METOPROLOL TARTRATE 25 MG: 25 TABLET, FILM COATED ORAL at 20:49

## 2024-04-01 RX ADMIN — PIPERACILLIN SODIUM AND TAZOBACTAM SODIUM 2.25 G: 2; .25 INJECTION, POWDER, LYOPHILIZED, FOR SOLUTION INTRAVENOUS at 01:25

## 2024-04-01 RX ADMIN — LEVALBUTEROL HYDROCHLORIDE 1.25 MG: 1.25 SOLUTION RESPIRATORY (INHALATION) at 12:21

## 2024-04-01 RX ADMIN — PIPERACILLIN SODIUM AND TAZOBACTAM SODIUM 2.25 G: 2; .25 INJECTION, POWDER, LYOPHILIZED, FOR SOLUTION INTRAVENOUS at 15:34

## 2024-04-01 RX ADMIN — LEVOTHYROXINE SODIUM 25 MCG: 0.03 TABLET ORAL at 06:31

## 2024-04-01 RX ADMIN — ACETYLCYSTEINE 2 ML: 100 SOLUTION ORAL; RESPIRATORY (INHALATION) at 12:21

## 2024-04-01 RX ADMIN — CLONIDINE HYDROCHLORIDE 0.1 MG: 0.1 TABLET ORAL at 22:05

## 2024-04-01 RX ADMIN — LEVALBUTEROL HYDROCHLORIDE 1.25 MG: 1.25 SOLUTION RESPIRATORY (INHALATION) at 20:41

## 2024-04-01 RX ADMIN — CALCIUM CARBONATE 600 MG (1,500 MG)-VITAMIN D3 400 UNIT TABLET 1 TABLET: at 13:00

## 2024-04-01 RX ADMIN — SULFAMETHOXAZOLE AND TRIMETHOPRIM 1 TABLET: 400; 80 TABLET ORAL at 20:49

## 2024-04-01 RX ADMIN — Medication 40 MG: at 09:02

## 2024-04-01 RX ADMIN — AMIODARONE HYDROCHLORIDE 400 MG: 200 TABLET ORAL at 08:57

## 2024-04-01 RX ADMIN — PREDNISONE 5 MG: 5 TABLET ORAL at 08:58

## 2024-04-01 RX ADMIN — Medication 2.5 MCG: at 21:15

## 2024-04-01 RX ADMIN — APIXABAN 2.5 MG: 2.5 TABLET, FILM COATED ORAL at 08:58

## 2024-04-01 RX ADMIN — LEVALBUTEROL HYDROCHLORIDE 1.25 MG: 1.25 SOLUTION RESPIRATORY (INHALATION) at 08:14

## 2024-04-01 RX ADMIN — METOPROLOL TARTRATE 25 MG: 25 TABLET, FILM COATED ORAL at 08:58

## 2024-04-01 RX ADMIN — OLANZAPINE 5 MG: 5 TABLET, ORALLY DISINTEGRATING ORAL at 22:05

## 2024-04-01 RX ADMIN — CALCIUM CARBONATE 600 MG (1,500 MG)-VITAMIN D3 400 UNIT TABLET 1 TABLET: at 18:29

## 2024-04-01 RX ADMIN — PIPERACILLIN SODIUM AND TAZOBACTAM SODIUM 2.25 G: 2; .25 INJECTION, POWDER, LYOPHILIZED, FOR SOLUTION INTRAVENOUS at 08:58

## 2024-04-01 RX ADMIN — CHLORHEXIDINE GLUCONATE 0.12% ORAL RINSE 15 ML: 1.2 LIQUID ORAL at 20:48

## 2024-04-01 RX ADMIN — CALCIUM CARBONATE 600 MG (1,500 MG)-VITAMIN D3 400 UNIT TABLET 1 TABLET: at 08:57

## 2024-04-01 ASSESSMENT — ACTIVITIES OF DAILY LIVING (ADL)
ADLS_ACUITY_SCORE: 39

## 2024-04-01 NOTE — PLAN OF CARE
ICU End of Shift Summary. See flowsheets for vital signs and detailed assessment.    Changes this shift: pt's ps change from 5/5 to 8/5 (30%) when sleeping, no significant change overnight, slept well between cares, VSS, makes needs known     Plan: continue with plan of care     Goal Outcome Evaluation:      Plan of Care Reviewed With: patient    Overall Patient Progress: no changeOverall Patient Progress: no change    Outcome Evaluation: vent seeting remains the same      Problem: Mechanical Ventilation Invasive  Goal: Effective Communication  Intervention: Ensure Effective Communication  Recent Flowsheet Documentation  Taken 4/1/2024 0400 by Avtar Hui RN  Communication Enhancement Strategies:   call light answered in person   communication board used   nonverbal strategies used  Trust Relationship/Rapport:   care explained   choices provided  Taken 4/1/2024 0000 by Avtar Hui RN  Communication Enhancement Strategies:   call light answered in person   communication board used   nonverbal strategies used  Trust Relationship/Rapport:   care explained   choices provided  Taken 3/31/2024 2000 by Avtar Hui RN  Communication Enhancement Strategies:   call light answered in person   communication board used   nonverbal strategies used  Trust Relationship/Rapport:   care explained   choices provided     Problem: Mechanical Ventilation Invasive  Goal: Mechanical Ventilation Liberation  Outcome: Not Progressing  Intervention: Promote Extubation and Mechanical Ventilation Liberation  Recent Flowsheet Documentation  Taken 4/1/2024 0400 by Avtar Hui RN  Medication Review/Management: medications reviewed  Environmental Support:   calm environment promoted   personal routine supported  Taken 4/1/2024 0000 by Avtar Hui RN  Medication Review/Management: medications reviewed  Environmental Support:   calm environment promoted   personal routine supported  Taken  3/31/2024 2000 by Avtar Hui, RN  Medication Review/Management: medications reviewed  Environmental Support:   calm environment promoted   personal routine supported

## 2024-04-01 NOTE — PROGRESS NOTES
MEDICAL ICU PROGRESS NOTE  04/01/2024      Date of Service (when I saw the patient): 04/01/2024    ASSESSMENT: Sofie Rodriguez is a 63 year old female with PMH COPD s/p bilateral lung transplant 6/28/22 c/b hemidiaphragm palsy and recurrent pneumonias, gastroparesis and small bowel dysmotility complicated by severe malnutrition now s/p PEG/J, ESRD on M/W/F HD, recent R femoral fx s/p ORIF, chronic diarrhea, recurrent c-diff, failure to thrive with inability to tolerate any tube feeds, who was admitted to [facility] on 2/10/24 for concerns over malnutrition and TPN initiation via portacath. Course complicated by recurrent and progressive acute on chronic hypoxic and hypercarbic respiratory failure requiring multiple ICU admissions and intubation 2/18-2/19, 2/28-2/29, 3/18-3/20, for continuous BiPAP. Again with worsening respiratory acidosis and hypercarbia, concern for infection vs acute rejection, requiring transfer back to ICU 3/20/2024 for intubation and bronchoscopy.    CHANGES and MAJOR THINGS TODAY:     - This am pt is writing that she has made a decision and that she wants to proceed with extubation, intubation with trach if needed  - Will plan for HD tomorrow am, then do T-piece for 2-3 hours, check abg during hour 2-3  - Do not extubate; see above  - Continue 5/5 pressure support as tolerated, 8/5 at night     PLAN:     Neuro:  # Acute pain  # Sedation  Intubated and awake, alert. Denies new or acute pain. Uses lidocaine and heating pads as needed for pain management.   - Precedex weaned off   - RASS goal 0 to -1  - Tylenol Q4 prn  - Lidocaine patch prn  - Heating pads prn    # Hx of Anxiety   # Claustrophobia  Reports claustrophobia contributing to limited compliance with BiPAP. Has seen health psych on 3/20, recommended grounding exercise (5-4-3-2-1) for use on BiPAP.   - Zyprexa 5mg at bedtime   - Atarax 25 mg TID PRN for anxiety  - Ativan 0.5 mg PRN for anxiety  - Clonidine 0.1 mg at bedtime      # B/L Neuropathic Pain   New pain b/l this hospitalization. Last A1c <4.2 (7/11/23). Consider possibly 2/2 ESRD. TSH wnl. B12 and B1 elevated, B6 normal. Copper low (56.3), zinc mildly low (48.3).   - Consider gabapentin in the coming days as needed  - Neuro Recs:  - No obvious clinical history or exam findings to suggest neurologic/neuromuscular causes of respiratory weakness  - Neuropathy labs currently pending  - Holding B12 supplement (supra-therapeutic 3/15)      Pulmonary:  # Acute on chronic hypoxic and hypercarbic respiratory failure  # Recurrent PNAs, concern for acute infection vs acute rejection  # S/p BSLT 6/8/22 for COPD  # R hemidiaphragm palsy   # Positive DSA   # Suspected CARLEE  # PTA nocturnal O2, 2L   S/p bilateral lung transplant 6/28/22 for COPD. Was admitted 2/10 to address malnutrition and admitted to ICU on 2/17 with hypoxic hypercarbic respiratory failure. Intubated on 2/18 due to worsening oxygen requirements, likely due to pulmonary edema given hx of ESRD requiring HD vs infection given hx of lung transplant, immunosuppressed status, and recurrent pneumonias. Required intubation 2/17 - 2/19. Ongoing respiratory acidosis despite BiPAP/AVAPS, claustrophobic while using to intermittent compliance. Neurology consulted and MRI r/o central cause problem for respiratory drive. Started on AVAPS with improvement in mental status and VBG, and patient transferred back to medicine floor on 2/29. 3/08 SNIFF with no definite paradoxical movement of bilateral hemidiaphragm. Transferred back to ICU 3/18-3/20 d/t hypercarbia, severe respiratory acidosis, but did not require intubation during this time. Issues with anxiety/claustrophobia while using the mask, Atarax has helped though Zyprexa led to hallucinations. Even with pH this low, and pCO2 that high, mentation remains stable. CT chest 3/24 w/ scattered opacity throughout all lungs; dilated pulm artery. On BiPAP 16/5 at time of transfer. Transplant  pulm concerned for infectious vs acute rejection picture, recommending intubation + bronchoscopy, have been following since. Worry there may be an idiopathic picture causing decreased respiratory drive. Volume overload + pulmonary edema complicating picture, as patient has had low pressures limiting HD runs.   - Daily VBG  - Intubated on 03/24   - Transplant pulm following, appreciate recs               - 3/18 prospera in process  - Immunosuppression:   >Prednisone 5 mg every morning, 2.5 mg every afternoon  >Cyclosporine 200mg BID (Stopped tacrolimus 3/10)   >Overnight oximetry study suggestive of O2 vs CPAP need, as did require up to 2LPM overnight to prevent hypoxia  >Try to minimize O2 to preserve respiratory drive, will give IVIG for DSA+   >D/c'd theophylline 3/3/24 due to difficulty attaining therapeutic levels (started by ICU), could consider Modafinil   >Repeat metabolic CART study ordered by Transplant Pulm   - Airway clearance/nebs:   - Xopenex neb BID  - Hypertonic saline nebs q 3 hours PRN   - Volume removal with iHD  - Sleep clinic eval at discharge for suspected CARLEE  - Limit medications that would depress respiration  - Continue PST 8/5 at not 5/5 as long as tolerated   - Will plan for HD tomorrow am, then do T-piece for 2-3 hours, check abg during hour 2-3    Vent Mode: PS  (Pressure Support)  FiO2 (%): 30 %  PEEP (cm H2O): 5 cmH2O  Pressure Support (cm H2O): (S) 5 cmH2O  Resp: 19    # Goals of Care  - 3/15 Care conference on with Transplant Pulm, [facility], Medicine, daughters (Charity, Julia) and Transplant SW. Overall medical updates provided and Qs answered. With declining respiratory acidosis on labs, discussed code status 3/18. Patient initially not desiring any escalation of her care to ICU/intubation with frustration re overall course. However, after discussing with her daughter on the phone she and family elected to remain full code and agreed to ICU admission and intubation if necessary.   -  3/29 Care conference: Laid out a few options for family and patient to consider with qs answered. Examples being we could attempt to extubate to BiPAP but plan should be established prior to extubation that if patient decompensates and requires reintubation then likely pursue trach versus more comfort approach. Other option is going straight for trach now. Patient and family (daughter Julia and son present on Ipad) considering the options and had a lot of questions about trach and what that would look like long-term, which was laid out for the family and patient.   - 4/1 This am pt is writing that she has made a decision and that she wants to proceed with extubation, intubation with trach if needed.     Cardiovascular:  # A-flutter (recurrent on 3/17)  # A-fib, intermittent  # HTN  # HFpEF  Noted Afib/flutter intermittently throughout admission. Was started on amiodarone bolus with drip and transitioned to PO dosing.  2/17 TTE: LVEF 55-60%, global RV function normal, no significant valvular abnormalities. Briefly required levophed and midodrine for HD but otherwise stable off pressors.   - MAP goal > 65 mmHg   - Continue Metoprolol tartrate dose 25 mg BID (hold if MAP<65)  - Continue amiodarone 400mg BID PO, dose decreased to 200mg starting 4/2  - Midodrine with HD      GI/Nutrition:  # Severe malnutrition   # Failure to thrive  # Hypoalbuminemia  # Gastroparesis, small bowel dysmotility  # S/P PEG/J with intolerance of enteral nutrition  # Chronic osmotic diarrhea/SIBO s/p Rifaximin  # Recurrent C.Diff (3rd ep 3/12/24)    # GERD  Patient with gastroparesis (presumed due to vagal injury) and small bowel dysmotility complicated by unintentional 40lb weight loss over the past year and now severe malnutrition. Previously intolerant to oral food intake due to nausea. Was initially admitted for portacath and TPN initiation since 2/13. Intermittently tolerating feeds without n/v from 2/20.  Per GI, no ongoing concerns  for SIBO as of 2/23. As of 3/11 transplant pulmonology is worried that TPN is not a realistic plan to continue at home and would like to transition back to TF (RD oncerned pt is not absorbing any oral intake). TPN discontinued 3/16. Transplant pulm also worried about mucosal toxicity. Recurrent C.diff 3/12, Fidaxomicin x 10 days (3/13-3/22), with improvement in diarrhea. Transplant pulm requesting repeat colonoscopy w/ Bx after completion of c.diff treatment, to r/o toxicity or other reason that diarrhea is present.   - Nutrition consulted, appreciate assistance  - Continue TF at goal rate 35 ml/hr via PEG/J          - Consider reconsult GI week of 3/25/after metabolic CART study, for future colonoscopy +/- biopsy if diarrhea returns   - PPI BID  - Bowel regimen PRN  - Confirmed Osmotic diarrhea with stool studies--> Likely not infectious, continue adjusting TF and loperamide PRN      Renal/Fluids/Electrolytes:  # ESRD on HD  # Mild hyperphosphatemia  Patient is ESRD on T/Th/Sat HD as outpt, now approx M/W/F inpatient. HD tolerating until 3/23. Briefly required extra Monday runs, not since being off TPN. She would prefer longer sessions to more days.  - Midodrine 10mg q8h with dialysis days   - Currently holding Sevelamer but may need to resume in the coming days per Nephrology   - CBC and CMP daily  - Strict I/Os  - Daily weight   - Renally adjust medications      Endocrine:  # Hyperglycemia, stress induced  Has been euglycemic for the past few days. Not on insulin.  - Hypoglycemia protocol     # Hypothyroidism  Patient with new (2/17/24) low T4 at 0.64 and TSH at 6.5, concerning for new hypothyroidism vs sick thyroid syndrome. TSH with appropriate response, more consistent with elevation in the setting of acute illness. ICU team on 2/28 recommended initiation of treatment for possible hypothyroid as this can improve respiratory muscle function in the setting of weakness and malnutrition. 3/7, 3/15, 3/20 repeat  thyroid function WNL.  - Continue liothyronine + levothyroxine -- note that prolonged combination therapy is not optimal & regimen should be re-evaluated (likely stop liothyronine, up-titrate levothyroxine) in post-acute setting      ID:  # Recurrent pneumonia, concern for acute infection vs rejection  # Recurrent C.Diff colitis (3rd ep 3/12/24)  # Hx EBV viremia   # Hypogammaglobulinemia  # Chronic immunosuppression / lung transplant  Empirically treated for pneumonia  - , RVP and cultures no growth to date. Recurrent C.Diff Positive 3/12, s/p PO Fidaxomicin 200 mg BID for 10 days (3/13-3/22) with improvement in diarrhea. Remains afebrile, leukocytosis resolved.  Ig, s/p IVIG.  EBV 27K (decreased compared to prior).     - Follow up BAL and blood cultures from 3/24  - Continue empiric antibiotic as below    Antibiotics:  Zosyn ( - , 3/24 - )   Bactrim (MWF, PJP ppx, previously on Dapsone)  Vancomycin ( - , 3/24-3/25)  Micafungin (- , 3/24 x1)  Fidaxomicin (3/13-3/22)    Micro:   - BAL 3/24:   - Cell count w diff: PMN 75%, lymphocytes 5, monocytes 19, eos 1  - No organisms seen on Gram stain  - RVP, HSV, Histoplasma neg  - Fungal & bacterial cultures NGTD  - EBV, CMV, PJP, Aspergillus, Legionella, Mycoplasma, AFB in process  - Bcx 3/24 NGTD    Hematology:    #Acute on chronic anemia 2/2 ESRD  Hgb stable, with no acute signs of bleeding.   - Daily CBC  - Transfuse for Hgb < 7  - Continue Epogen with dialysis, held in setting of concern for acute infection   - Continue Venofer 50 mcg qweek with dialysis, held in setting of concern for acute infection     #Steal physiology of LUE dialysis access fistula  LUE arterial US obtained  with concern for LUE edema. US of fistula demonstrated steal physiology. Discussed with nephrology, and vascular surgery consulted on . Vascular surgery has only minor concerns for steal symptoms and given that the AVF is working well,  they are okay with outpatient fistulograms/ venoplasty with wrist brachial index and PPG's, unless new concerns arise.  - Plan for outpatient workup as above; nephrology in agreement with outpatient workup.    Musculoskeletal:  # Right hip fracture s/p ORIF (December 2023)   - PT/OT to eval and treat      Skin:  # Left upper extremity unilateral edema, improving   # Bilateral pedal and ankle edema, improving  Edema due to third spacing due to hypoalbuminemia vs HF. BNP >08321 at admission, Echo on 2/18 shows EF of 55-60% with collapsible IVC. Extremity edema 2/2 to hypoalbuminemia. Upper limb duplex USG on 2/18 negative for DVT.  USG left arm on 2/21 shows steal physiology and Vascular Surgery consulted (see Heme section). Overall edema in extremities have improved significantly with dialysis.    - Elevation and wrapping of only lower legs as needed and increased UF per nephrology for volume management; no wrapping of left upper extremity with dialysis fistula      General Cares/Prophylaxis:    DVT Prophylaxis: Apixaban  GI Prophylaxis: PPI  Restraints: N/A  Family Communication: Daughters Charity & Julia  Code Status: FULL      Lines/tubes/drains:  - PEG/J  - PICC line in RUE   - PIV x1    Disposition:  - Medical ICU      Patient seen and findings/plan discussed with medical ICU staff, RUFUS Escamilla CNP     Clinically Significant Risk Factors            # Hypomagnesemia: Lowest Mg = 1.6 mg/dL in last 2 days, will replace as needed   # Hypoalbuminemia: Lowest albumin = 2.6 g/dL at 2/18/2024  5:13 AM, will monitor as appropriate  # Coagulation Defect: INR = 1.29 (Ref range: 0.85 - 1.15) and/or PTT = N/A, will monitor for bleeding            # Severe Malnutrition: based on nutrition assessment      # Financial/Environmental Concerns: none            ====================================  INTERVAL HISTORY:   Awake and following commands. Writing in sentences and has made the decision that she would  like to proceed with extubation w reintubation if needed w trach to follow. Otherwise hemodynamically stable.     OBJECTIVE:   1. VITAL SIGNS:   Temp:  [97.7  F (36.5  C)-99  F (37.2  C)] 98  F (36.7  C)  Pulse:  [59-83] 60  Resp:  [15-24] 19  BP: (103-154)/(45-83) 144/68  FiO2 (%):  [30 %] 30 %  SpO2:  [92 %-100 %] 100 %  Vent Mode: PS  (Pressure Support)  FiO2 (%): 30 %  PEEP (cm H2O): 5 cmH2O  Pressure Support (cm H2O): (S) 5 cmH2O  Resp: 19  PIP 25    2. INTAKE/ OUTPUT:   I/O last 3 completed shifts:  In: 1785 [I.V.:485; NG/GT:530]  Out: -   Net -2106 past 24h    3. PHYSICAL EXAMINATION:  General: Awake, alert & oriented, resting in bed, communicating via writing  HEENT: Mucous membranes moist  Neuro: Awake and alert, no focal deficits, moving all extremities to command and spontaneously  Pulm/Resp: Coarse breath sounds bilaterally, diminished on R>L, no accessory muscle use  CV: RRR, S1/S2 without m/r/g; pedal pulses 2+ without LE edema  Abdomen: Soft, non-distended, non-tender  : Rectal tube in place  Incisions/Skin: PICC and PEG site without surrounding erythema, no rashes noted    4. LABS:   Venous Blood Gas  Recent Labs   Lab 04/01/24  0515 03/31/24  0456 03/30/24  1418 03/30/24  0336   PHV 7.32 7.34 7.31* 7.30*   PCO2V 61* 60* 62* 60*   PO2V 49* 44 47 47   HCO3V 31* 32* 31* 30*   LINDY 4.0* 5.4* 4.0* 2.5   O2PER 30 30 30 30     Complete Blood Count   Recent Labs   Lab 04/01/24  0515 03/31/24  0456 03/30/24  0336 03/29/24  0313   WBC 4.1 4.7 5.2 5.0   HGB 8.7* 8.8* 8.7* 8.9*    204 180 176     Basic Metabolic Panel  Recent Labs   Lab 04/01/24  0750 04/01/24  0515 03/31/24  0456 03/30/24  0825 03/30/24  0336 03/29/24  0905 03/29/24 0313   NA  --  138 140  --  138  --  141   POTASSIUM  --  3.5 3.5  --  3.8  --  3.5   CHLORIDE  --  96* 99  --  98  --  101   CO2  --  27 29  --  26  --  28   BUN  --  47.7* 27.4*  --  48.7*  --  25.8*   CR  --  3.47* 2.20*  --  3.25*  --  2.16*   GLC 89 126* 130* 101*  113*   < > 121*    < > = values in this interval not displayed.     Liver Function Tests  Recent Labs   Lab 04/01/24  0515 03/31/24  0456 03/30/24  0336 03/29/24  0313   AST 14 17 18 21   ALT 14 13 14 13   ALKPHOS 80 81 92 94   BILITOTAL 0.2 0.3 0.3 0.3   ALBUMIN 3.5 3.4* 3.5 3.4*   INR 1.29* 1.27* 1.24* 1.18*     Coagulation Profile  Recent Labs   Lab 04/01/24  0515 03/31/24  0456 03/30/24  0336 03/29/24  0313   INR 1.29* 1.27* 1.24* 1.18*       5. RADIOLOGY:   No results found for this or any previous visit (from the past 24 hour(s)).

## 2024-04-01 NOTE — PROGRESS NOTES
Pulmonary Medicine  Cystic Fibrosis - Lung Transplant Team  Progress Note  2024     Patient: Sofie Rodriguez  MRN: [id number]  : 1960 (age 63 year old)  Transplant: 2022 (Lung), POD#643  Admission date: 2/10/2024    Assessment & Plan:     Sofie Rodriguez is a 63 year old female with h/o COPD s/p BSLT () with course complicated by post-operative hemidiaphragm palsy, recurrent PNAs, positive DSA, EBV viremia, hypogammaglobulinemia, severe gastroparesis s/p G/J tube placement (22) and pyloric botox (23), GI bleed /2 pyloric ulcer, hemobilia s/p ERCP and MRCP, chronic diarrhea, recurrent C diff colitis, ESRD on iHD, recurrent falls with injuries (recent right hip fx s/p ORIF 2023), deconditioning, and FTT.  Admitted 2/10 for failure to thrive and initiation of TPN/lipids.  Emergent intubation - for hypoxic and hypercapneic respiratory failure and encephalopathy. Returned to ICU - and again 3/18-3/20 d/t tenuous respiratory status complicated by variable daytime NIPPV tolerance and hypervolemia with iHD limited d/t hypotension. Again transferred back to ICU 3/24 for intubation and bronch/BAL given hypoxic and hypercapneic respiratory failure. CT with extensive bilateral infiltrate and etiology likely multifactorial including volume overload and infection, possible mucous plugging, ACR or AMR less likely.  Tolerating continuous PS after recurrent period of aggressive volume removal with dialysis.       Today's recommendations:  - Following cultures  - Zosyn through  for 10 day empiric course  - Will need to pursue tracheostomy placement if pt. fails extubation (pt. agreeable) but must acknowledge that this will likely eliminate most if not all disposition options given concurrent need for iHD  - Strict adherence to NIPPV overnight and with all naps after extubation, should obtain home NIPPV machine and work to transition to long-term device while inpatient  -  Orders entered   ImmuKnow ordered 3/23 but never collected (remained as active outstanding order), reordered for today  - CSA level ordered 4/3  - DSA and EBV due 4/23     Acute on chronic hypoxic/hypercapneic respiratory failure:  S/p bilateral sequential lung transplant for COPD:   Hypoventilation, Suspected CARLEE:  PFTs in clinic remained significantly below her baseline.  Baseline hypoxia with 2L NC overnight PTA.  S/p intubation 2/17-2/19 for acute hypoxic/hypercapneic respiratory failure with encephalopathy, CT with concern for infection and pulmonary edema given recent addition of TPN nutrition.  Bronch (2/18) with very friable tissue, cutlures only with C. glabrata.  Persistent respiratory failure also complicated by hypoventilation and deconditioning d/t malnutrition. Neurology consulted 2/27, MRI brain (3/1) without acute intracranial pathology.  SNIFF (3/8) normal.  Metabolic CART suggested overfeeding on TPN.  Returned to ICU again 3/18-3/20 d/t tenuous respiratory status complicated by NIPPV intolerance and hypervolemia with iHD limited d/t hypotension (CXR with increased pulmonary edema).  Overall, her PCO2 retention is likely multifactorial with a component being from overfeeding (though remedied with nutrition adjustment), metabolic compensation barrier d/t renal failure, pulmonary edema, bicarb loss with diarrhea, hypoventilation with declining NIF and FVC concerning for neuromuscular etiology (though Neurology consult was not revealing), and NIPPV intolerance due to claustrophobia. Worsening hypoxic and hypercapneic respiratory failure 3/24 and transferred to ICU for intubation. CT chest with extensive bilateral infiltrates markedly increased from previous. Bronch with small L>R sided secretions easily suctioned, BAL with no evidence of DAH, non bloody. Etiology likely multifactorial including volume overload and infection, ACR or AMR less likely (as below).  Hypoxia improving, hypercapnia stable with PS.  - Blood  culture (3/24) NGTD  - BAL (3/24) NGTD  - ABX: Zosyn (3/23-4/2) for 10 day empiric course  - Aggressive airway clearance with Volera (intrapulmonary percussion) QID and nebs: Xopenex and Mucomyst QID  - Ventilator management and pressure support per MICU, daily VBG with goal for permissive hypercapnea to mid 60's, will need to pursue tracheostomy placement if pt. fails extubation (pt. agreeable) but must acknowledge that this will likely eliminate most if not all disposition options given concurrent need for iHD  - Strict adherence to NIPPV overnight and with all naps after extubation, should obtain home NIPPV machine and work to transition to long-term device while inpatient     Immunosuppression:  AZA previously stopped 5/2023 d/t low ImmuKnow assay and EBV viremia.  ImmuKnow (2/27) remained low at 88.  Transitioned from [place] to CSA 3/11.  - ImmuKnow ordered 3/23 but never collected (remained as active outstanding order), reordered for today  -  BID (dose held 3/30 PM and decreased 3/31).  Goal 140-170.  Repeat level 4/3 (ordered).  - Prednisone 5 mg qAM / 2.5 mg qPM     Prophylaxis:   - Bactrim qMWF for PJP ppx (3/13, prior dapsone through 3/11)  - CMV ppx not currently indicated, D+/R+, CMV negative 3/18     Positive DSA: AMR treatment deferred given frailty and stable PFTs.  DSA DQB2 decreased on 3/6 with 5667 mfi, and again decreased to 4960 on 3/23.  Most recent cell-free DNA (2/18) mildly elevated at 1.04 (concerning for possible rejection), which was increased from prior level of 0.12 (1/10).  IST increase deferred at that time per Dr. Gong.  S/p IVIG (2/27) for DSA (IgG WNL).  Immuknow as above.  Prospera (cell free DNA) 0.44 (3/18) suggests AMR less likely, follow  - Repeat DSA monthly (due 4/23)      EBV viremia: CT CAP (2/7) without lymphadenopathy.  Recent levels improving (3/18) 23k.  - Repeat EBV due 4/23     Other relevant problems being managed by the primary team:      FTT:  Severe  protein calorie malnutrition:  Gastroparesis s/p PEG/J, botox, and G-POEM:  SB hypomotility:  Pyloric ulcer:  Chronic nausea and osmotic diarrhea:  SIBO s/p rifaximin:   Recurrent C diff colitis: Chronic osmotic and infectious diarrhea since transplant with recurrent episodes of C diff.  Notable weight loss (40# in a year) d/t diarrhea, GI dysmotility, and intolerance of enteral feeds (PEG/J tube in place), most recently on elemental formula.  Extensive OP eval and f/u with GI. S/p port placement for TPN and lipids. Continued for ~5 weeks inpatient without considerable improvement, transitioned back to TF.  C diff positive (3/13), on fidaxomicin.     ESRD: CT with volume overload secondary to TPN volume, iHD increased from PTA TThSa schedule with unsustained improvement.  Management per Nephrology, dialysis via [facility] CVC.  Unable to remove fluid on 3/23 d/t hypotension.  - Volume removal and dialysis per nephrology     Goals of care: Care conference held with palliative and primary team on 3/15 for medical update with pt. and daughters.  Repeat care conference 3/29 (see palliative and MICU notes) patient would like to proceed with: plan for extubation when able, hope to tolerate some bipap, if doesn't go well then reintubate with trach OR pursue comfort measures. Pt and her family are still thinking about overall what they would do if bipap were not to work or she wouldn't tolerate it.       We appreciate the excellent care provided by the MICU team.  Recommendations communicated via this note.  Will continue to follow along closely, please do not hesitate to call with any questions or concerns.    Patient discussed with Dr. Castillo.    Catherine Johnson, DNP, APRN, CNP  Inpatient Nurse Practitioner  Pulmonary CF/Transplant     Subjective & Interval History:     On PS 5/5/30 during the day but increased to 8/5/30 during the day yesterday.  Back to 5/5 this morning, tolerating well.  Stable secretions.  Off iHD yesterday  per pt.  On TF, loose stools improved.    Review of Systems:     C: No fever, no chills  INTEGUMENTARY/SKIN: No rash or obvious new lesions  ENT/MOUTH: No nasal congestion or drainage  RESP: See interval history  CV: No chest pain, no palpitations, no peripheral edema, no orthopnea  GI: No nausea, no vomiting, no reflux symptoms  : No dysuria  MUSCULOSKELETAL: No myalgias, no arthralgias  ENDOCRINE: Blood sugars with adequate control  NEURO: No headache, no numbness or tingling  PSYCHIATRIC: Mood stable    Physical Exam:     All notes, images, and labs from past 24 hours (at minimum) were reviewed.    Vital signs:  Temp: 97.9  F (36.6  C) Temp src: Axillary BP: (!) 147/66 Pulse: 62   Resp: 20 SpO2: 100 % O2 Device: Mechanical Ventilator     Weight: 48.1 kg (106 lb 0.7 oz)  I/O:   Intake/Output Summary (Last 24 hours) at 4/1/2024 1234  Last data filed at 4/1/2024 1100  Gross per 24 hour   Intake 1575 ml   Output --   Net 1575 ml     Constitutional: Sitting up in a chair, in no apparent distress.   HEENT: Eyes with pink conjunctivae, anicteric.  Orally intubated.   PULM: Good air flow bilaterally.  No crackles, no rhonchi, no wheezes.  Non-labored breathing on PS 5/5/30.  CV: Normal S1 and S2.  RRR.  No murmur, gallop, or rub.  No peripheral edema.   ABD: NABS, soft, nontender, nondistended.  PEG/J tube site not visualized.  MSK: Moves all extremities.  + muscle wasting.   NEURO: Alert and conversant by mouthing words.   SKIN: Warm, dry, fragile, scattered ecchymosis.   PSYCH: Mood stable.     Data:     LABS    CMP:   Recent Labs   Lab 04/01/24  0750 04/01/24  0515 03/31/24  0456 03/30/24  0825 03/30/24  0336 03/29/24  0905 03/29/24  0313   NA  --  138 140  --  138  --  141   POTASSIUM  --  3.5 3.5  --  3.8  --  3.5   CHLORIDE  --  96* 99  --  98  --  101   CO2  --  27 29  --  26  --  28   ANIONGAP  --  15 12  --  14  --  12   GLC 89 126* 130* 101* 113*   < > 121*   BUN  --  47.7* 27.4*  --  48.7*  --  25.8*   CR   "--  3.47* 2.20*  --  3.25*  --  2.16*   GFRESTIMATED  --  14* 25*  --  16*  --  25*   ESTUARDO  --  9.0 8.8  --  9.3  --  9.4   MAG  --  2.4* 1.6*  --  1.9  --  1.8   PHOS  --  6.1* 4.4  --  5.9*  --  4.1   PROTTOTAL  --  5.4* 5.6*  --  5.7*  --  5.7*   ALBUMIN  --  3.5 3.4*  --  3.5  --  3.4*   BILITOTAL  --  0.2 0.3  --  0.3  --  0.3   ALKPHOS  --  80 81  --  92  --  94   AST  --  14 17  --  18  --  21   ALT  --  14 13  --  14  --  13    < > = values in this interval not displayed.     CBC:   Recent Labs   Lab 04/01/24 0515 03/31/24 0456 03/30/24 0336 03/29/24  0313   WBC 4.1 4.7 5.2 5.0   RBC 2.56* 2.56* 2.51* 2.60*   HGB 8.7* 8.8* 8.7* 8.9*   HCT 28.9* 28.9* 28.6* 29.8*   * 113* 114* 115*   MCH 34.0* 34.4* 34.7* 34.2*   MCHC 30.1* 30.4* 30.4* 29.9*   RDW 16.4* 16.5* 16.9* 17.0*    204 180 176       INR:   Recent Labs   Lab 04/01/24  0515 03/31/24  0456 03/30/24  0336 03/29/24  0313   INR 1.29* 1.27* 1.24* 1.18*       Glucose:   Recent Labs   Lab 04/01/24  0750 04/01/24  0515 03/31/24  0456 03/30/24  0825 03/30/24  0336 03/30/24  0332   GLC 89 126* 130* 101* 113* 115*       Blood Gas:   Recent Labs   Lab 04/01/24 0515 03/31/24 0456 03/30/24  1418   PHV 7.32 7.34 7.31*   PCO2V 61* 60* 62*   PO2V 49* 44 47   HCO3V 31* 32* 31*   LINDY 4.0* 5.4* 4.0*   O2PER 30 30 30       Culture Data No results for input(s): \"CULT\" in the last 168 hours.    Virology Data:   Lab Results   Component Value Date    FLUAH1 Not Detected 03/24/2024    FLUAH3 Not Detected 03/24/2024    UK9719 Not Detected 03/24/2024    IFLUB Not Detected 03/24/2024    RSVA Not Detected 03/24/2024    RSVB Not Detected 03/24/2024    PIV1 Not Detected 03/24/2024    PIV2 Not Detected 03/24/2024    PIV3 Not Detected 03/24/2024    HMPV Not Detected 03/24/2024       Historical CMV results (last 3 of prior testing):  Lab Results   Component Value Date    CMVQNT Not Detected 03/18/2024    CMVQNT Not Detected 02/19/2024    CMVQNT Not Detected 02/07/2024 "     Lab Results   Component Value Date    CMVLOG 3.2 07/12/2023    CMVLOG <2.1 04/19/2023    CMVLOG 3.5 01/25/2023       Urine Studies    Recent Labs   Lab Test 02/18/24  0222 05/18/23  0627   URINEPH 7.5* 5.0   NITRITE Negative Negative   LEUKEST Trace* Moderate*   WBCU 66* 21*       Most Recent [place]ze Pulmonary Function Testing (FVC/FEV1 only)  FVC-Pre   Date Value Ref Range Status   02/07/2024 1.19 L    01/10/2024 1.12 L    08/29/2023 1.48 L    07/25/2023 1.55 L      FVC-%Pred-Pre   Date Value Ref Range Status   02/07/2024 42 %    01/10/2024 39 %    08/29/2023 53 %    07/25/2023 55 %      FEV1-Pre   Date Value Ref Range Status   02/07/2024 1.13 L    01/10/2024 1.10 L    08/29/2023 1.43 L    07/25/2023 1.54 L      FEV1-%Pred-Pre   Date Value Ref Range Status   02/07/2024 51 %    01/10/2024 49 %    08/29/2023 64 %    07/25/2023 69 %        IMAGING    Recent Results (from the past 48 hour(s))   XR Chest Port 1 View    Narrative    Exam: XR CHEST PORT 1 VIEW, 4/1/2024 9:07 AM    Indication: Interval f/u post aggressive iHD/UF    Comparison: 3/29/2024    Findings:   Clamshell sternotomy and bilateral lung transplants. ET tube tip now  projects over the midthoracic trachea, repositioned from previous.  Right PICC tip near the superior cavoatrial junction.     Cardiomegaly with similar appearance of left greater than right  pleural effusions. Unchanged abnormal position of the left oblique  fissure. No pneumothorax or new consolidation.      Impression    Impression: Bilateral lung transplantation   1. Similar left greater than right pleural effusions.  2. Repositioned ET tube now projecting over the midthoracic trachea.    I have personally reviewed the examination and initial interpretation  and I agree with the findings.    ALVINA HARRY MD         SYSTEM ID:  [id number]

## 2024-04-01 NOTE — PROGRESS NOTES
Nephrology Progress Note  04/01/2024         Assessment & Recommendations:   Sofie Rodriguez is a 63 year old year old female with ESKD, COPD s/p b/l lung transplant in 6/2022 with multiple complications, gastroparesis s/p GJ tube, GIB, chronic diarrhea, recurrent c-diff, FTT with inability to tolerate any tube feeds, R hip fx s/p ORIF 12/2023, admitted on 2/10 for initiation of TPN/lipids, transferred to ICU on 2/17 with worsening mental status and respiratory acidosis in spite of bipap, ultimately intubated on 2/18, being treated for PNA, also with volume overload in setting of high volume TPN. Persistent respiratory acidosis in spite of volume off, transferred to ICU for hypotension and respiratory failure, improved on bipap, now floor status again 3/1. Transferred to ICU 3/18 again with worsening hypercapnic respiratory failure. Transferred to floor 3/20, back to ICU 3/24    # ESKD - TTS, JANAY SAEZG, 3hr, 45.5 kg, [facility], Dr. Andres Fairbanks.  - dialyzed daily last week, now back to euvolemia and will continue HD per TTS schedule, extra runs if needed. Pt is agreeable to 3.5 hr runs if it means avoiding 4x/week dialysis  - Requires EMLA cream an hour before HD  - please avoid PICC which will compromise future dialysis accesses; a midline is much preferred for this patient    # Persistent respiratory acidosis: when off vent  - difficulty tolerating bipap due to claustrophobia, but wearing this lately  - balancing act between giving bicarb to maintain pH in setting of severe persistent respiratory acidosis with bicarb quickly converting CO2 and worsening overall status  - recommend stopping PO bicarb, ok to restart for pH < 7.1 but would stop again once pH is 7.2; will continue to provide bicarb with dialysis  - transplant pulm following  - re-intubated 3/24 for bronch/BAL (NGTD), status improving with less trach support.    # Aflutter: on amio and BB  # Volume/BP: EDW 45-45.5 kg. Anuric; on metoprolol  "soln 25 mg bid   - CXR 3/25 with likely pulm edema, dialyzed daily this week, now appears euvolemic and back to EDW  - weights are quite variable, ?accuracy     # Nutrition: on [employer] tube feeds     # Anemia 2/2 ESKD  On Venofer 50 qwk, Mircera last dose 1/9/2024  - hgb 8's  - iron labs 3/31: ferritin 1007, iron 34, IS 26 (at goal)  - Will continue Venofer 50 mcg q week (Tuesday)  - continue epogen 8000 units via HD    # BMD:   - Ca 8-9's, phos 6.1, alb 3.5  - sevelamer on hold, will restart if persistently elevated this week         Recommendations were communicated to primary team via note     RABIA Moctezuma   Division of Renal Disease and Hypertension  [phone]    Interval History :   Seen bedside, may be extubated today. Doing well from fluid standpoint, question accuracy of bed weight today. Plan on 3L off tomorrow per usual TTS schedule. No n/v, CP, chills    Review of Systems:   4 point ROS neg other than as noted above    Physical Exam:   I/O last 3 completed shifts:  In: 1785 [I.V.:485; NG/GT:530]  Out: -    BP (!) 144/68   Pulse 60   Temp 97.9  F (36.6  C) (Axillary)   Resp 19   Ht 1.57 m (5' 1.81\")   Wt 48.1 kg (106 lb 0.7 oz)   SpO2 100%   BMI 19.51 kg/m       GENERAL APPEARANCE: on vent, alert and awake  PULM: diminished, on vent  CV: RRR    trace no peripheral  GI: soft   INTEGUMENT: no cyanosis, no rashes on exposed skin  NEURO: a/o3  Access Left AVG     Labs:   All labs reviewed by me  Electrolytes/Renal -   Recent Labs   Lab Test 04/01/24  0750 04/01/24  0515 03/31/24  0456 03/30/24  0825 03/30/24  0336   NA  --  138 140  --  138   POTASSIUM  --  3.5 3.5  --  3.8   CHLORIDE  --  96* 99  --  98   CO2  --  27 29  --  26   BUN  --  47.7* 27.4*  --  48.7*   CR  --  3.47* 2.20*  --  3.25*   GLC 89 126* 130*   < > 113*   ESTUARDO  --  9.0 8.8  --  9.3   MAG  --  2.4* 1.6*  --  1.9   PHOS  --  6.1* 4.4  --  5.9*    < > = values in this interval not displayed.       CBC -   Recent Labs   Lab " Test 04/01/24  0515 03/31/24  0456 03/30/24  0336   WBC 4.1 4.7 5.2   HGB 8.7* 8.8* 8.7*    204 180       LFTs -   Recent Labs   Lab Test 04/01/24  0515 03/31/24  0456 03/30/24  0336   ALKPHOS 80 81 92   BILITOTAL 0.2 0.3 0.3   ALT 14 13 14   AST 14 17 18   PROTTOTAL 5.4* 5.6* 5.7*   ALBUMIN 3.5 3.4* 3.5       Iron Panel -   Recent Labs   Lab Test 03/31/24  0456 09/26/22  0555 09/03/22  1039   IRON 34* 54 21*   IRONSAT 26 22 9*   CARLOS 1,007* 769* 343*         Imaging:  All imaging studies reviewed by me.     Current Medications:   acetylcysteine  2 mL Nebulization 4x daily    [START ON 4/2/2024] amiodarone  200 mg Oral or Feeding Tube Daily    amiodarone  400 mg Oral BID    apixaban ANTICOAGULANT  2.5 mg Oral BID    calcium carbonate-vitamin D  1 tablet Per J Tube TID w/meals    chlorhexidine  15 mL Mouth/Throat Q12H    cloNIDine  0.1 mg Oral At Bedtime    [Held by provider] cyanocobalamin  500 mcg Per Feeding Tube Daily    cycloSPORINE modified  125 mg Per G Tube BID IS    heparin lock flush  5-20 mL Intracatheter Q24H    levalbuterol  1.25 mg Nebulization 4x Daily    levothyroxine  25 mcg Oral QAM AC    lidocaine  1 patch Transdermal Q24h    liothyronine  2.5 mcg Oral BID    metoprolol tartrate  25 mg Per J Tube BID    midodrine  10 mg Oral 3 times per day on Tuesday Thursday Saturday    OLANZapine zydis  5 mg Oral At Bedtime    oxymetazoline  2 spray Both Nostrils BID    pantoprazole  40 mg Per J Tube BID    piperacillin-tazobactam  2.25 g Intravenous Q6H    predniSONE  5 mg Per J Tube QAM    And    predniSONE  2.5 mg Per J Tube QPM    [Held by provider] sevelamer carbonate (RENVELA)  0.8 g Oral BID    sodium chloride (PF)  10 mL Intracatheter Q8H    sodium chloride (PF)  10-40 mL Intracatheter Q8H    sulfamethoxazole-trimethoprim  1 tablet Oral Q Mon Wed Fri AM      dextrose      - MEDICATION INSTRUCTIONS -       RABIA Moctezuma, Rayna Boland, saw and evaluated this patient as part of  a shared visit.  I have reviewed and discussed with the advanced practice provider their history, physical and plan.  I have personally performed the substantive portion of the medical decision making for this visit - please see the EMILY's documentation for the full details  I personally reviewed the vital signs, medications, labs and imaging.    Key findings and management decisions made by me:  ESRD on dialysis, volume overload , we did more frequent dialysis last week, she is more stable this week, dialysis tomorrow and hoping for extubation, she did well on her trials today. She has no swelling , labs stable.    Rayna Boland  Date of Service (when I saw the patient): 4/1

## 2024-04-01 NOTE — PLAN OF CARE
ICU End of Shift Summary. See flowsheets for vital signs and detailed assessment.    Changes this shift: A&Ox4, makes needs known and communicates well through writing. Discomfort from ETT, pain controlled with PRN Tylenol. HR 60-70, Bp wnl, afebrile. PS 5/5 30% all shift, suctions oral secretions independently. Anuric - HD tomorrow morning. Multiple, loose BM's. OOB to chair most of day, worked with PT and OT.    Plan: Plan for HD tomorrow and trial of T. Piece.       Plan of Care Reviewed With: patient, family          Outcome Evaluation: VSS. PS 5/5 all day. OOB to chair and worked with PT/OT

## 2024-04-01 NOTE — PROGRESS NOTES
ICU Daily Rounding Checklist     Checklist Response Notes   Can sedation be reduced?  No    Can analgesia be reduced? No    Is delirium being assessed, addressed and prevented? Yes    Spontaneous awakening trial and/or Spontaneous breathing trial candidate?  Yes    Total fluid balance goal reviewed?  Yes Target Goals:     Net negative/net even [ 24h]   Is the patient at goals for lung protective ventilation? Yes    Head of bed elevation (30 degrees)? Yes    Skin breakdown assessment (prevention) completed? Yes    Is enteral nutrition at goal? Yes    Is blood glucose at goal? Yes    Deep venous thrombosis prophylaxis? Yes      Gastric ulcer prophylaxis?  Yes If coagulopathy (INR-1.5 PTT2x normal. Ph<50k), mechanical ventilation 48hr, history of GI bleed/ulcer within past year. TBL, SCI, or burn, or if >= 2 minor risk factors (sepsis, ICU stay 1 week, occult GI bleed > 6 days. glucocorticoid therapy, NSAID use, antiplatelet use)   Can Antibiotics be narrowed or discontinued? No    Early mobility candidate and physical therapy consulted? Yes    Is montgomery catheter needed? No    Is central venous/arterial catheter needed? No    Has the family been updated? Yes    Are the patient's goals of care and code status current? Yes

## 2024-04-02 LAB
ALBUMIN SERPL BCG-MCNC: 3.4 G/DL (ref 3.5–5.2)
ALP SERPL-CCNC: 80 U/L (ref 40–150)
ALT SERPL W P-5'-P-CCNC: 10 U/L (ref 0–50)
ANION GAP SERPL CALCULATED.3IONS-SCNC: 17 MMOL/L (ref 7–15)
AST SERPL W P-5'-P-CCNC: 13 U/L (ref 0–45)
BASE EXCESS BLDV CALC-SCNC: 3 MMOL/L (ref -3–3)
BASE EXCESS BLDV CALC-SCNC: 5 MMOL/L (ref -3–3)
BASE EXCESS BLDV CALC-SCNC: 6.6 MMOL/L (ref -3–3)
BASOPHILS # BLD AUTO: ABNORMAL 10*3/UL
BASOPHILS # BLD MANUAL: 0.1 10E3/UL (ref 0–0.2)
BASOPHILS NFR BLD AUTO: ABNORMAL %
BASOPHILS NFR BLD MANUAL: 2 %
BILIRUB SERPL-MCNC: 0.2 MG/DL
BUN SERPL-MCNC: 62.5 MG/DL (ref 8–23)
CALCIUM SERPL-MCNC: 8.8 MG/DL (ref 8.8–10.2)
CHLORIDE SERPL-SCNC: 94 MMOL/L (ref 98–107)
CREAT SERPL-MCNC: 4.38 MG/DL (ref 0.51–0.95)
DEPRECATED HCO3 PLAS-SCNC: 26 MMOL/L (ref 22–29)
EGFRCR SERPLBLD CKD-EPI 2021: 11 ML/MIN/1.73M2
EOSINOPHIL # BLD AUTO: ABNORMAL 10*3/UL
EOSINOPHIL # BLD MANUAL: 0.4 10E3/UL (ref 0–0.7)
EOSINOPHIL NFR BLD AUTO: ABNORMAL %
EOSINOPHIL NFR BLD MANUAL: 7 %
ERYTHROCYTE [DISTWIDTH] IN BLOOD BY AUTOMATED COUNT: 16.1 % (ref 10–15)
GLUCOSE BLDC GLUCOMTR-MCNC: 107 MG/DL (ref 70–99)
GLUCOSE SERPL-MCNC: 125 MG/DL (ref 70–99)
HCO3 BLDV-SCNC: 31 MMOL/L (ref 21–28)
HCO3 BLDV-SCNC: 32 MMOL/L (ref 21–28)
HCO3 BLDV-SCNC: 33 MMOL/L (ref 21–28)
HCT VFR BLD AUTO: 28.5 % (ref 35–47)
HGB BLD-MCNC: 8.8 G/DL (ref 11.7–15.7)
IMM GRANULOCYTES # BLD: ABNORMAL 10*3/UL
IMM GRANULOCYTES NFR BLD: ABNORMAL %
INR PPP: 1.35 (ref 0.85–1.15)
LYMPHOCYTES # BLD AUTO: ABNORMAL 10*3/UL
LYMPHOCYTES # BLD MANUAL: 0.6 10E3/UL (ref 0.8–5.3)
LYMPHOCYTES NFR BLD AUTO: ABNORMAL %
LYMPHOCYTES NFR BLD MANUAL: 11 %
MAGNESIUM SERPL-MCNC: 2.4 MG/DL (ref 1.7–2.3)
MCH RBC QN AUTO: 33.8 PG (ref 26.5–33)
MCHC RBC AUTO-ENTMCNC: 30.9 G/DL (ref 31.5–36.5)
MCV RBC AUTO: 110 FL (ref 78–100)
MONOCYTES # BLD AUTO: ABNORMAL 10*3/UL
MONOCYTES # BLD MANUAL: 0.2 10E3/UL (ref 0–1.3)
MONOCYTES NFR BLD AUTO: ABNORMAL %
MONOCYTES NFR BLD MANUAL: 4 %
MYELOCYTES # BLD MANUAL: 0.1 10E3/UL
MYELOCYTES NFR BLD MANUAL: 1 %
NEUTROPHILS # BLD AUTO: ABNORMAL 10*3/UL
NEUTROPHILS # BLD MANUAL: 3.8 10E3/UL (ref 1.6–8.3)
NEUTROPHILS NFR BLD AUTO: ABNORMAL %
NEUTROPHILS NFR BLD MANUAL: 75 %
NRBC # BLD AUTO: 0 10E3/UL
NRBC BLD AUTO-RTO: 0 /100
O2/TOTAL GAS SETTING VFR VENT: 30 %
O2/TOTAL GAS SETTING VFR VENT: 36 %
O2/TOTAL GAS SETTING VFR VENT: 40 %
OXYHGB MFR BLDV: 73 % (ref 70–75)
OXYHGB MFR BLDV: 76 % (ref 70–75)
OXYHGB MFR BLDV: 77 % (ref 70–75)
PCO2 BLDV: 59 MM HG (ref 40–50)
PCO2 BLDV: 61 MM HG (ref 40–50)
PCO2 BLDV: 62 MM HG (ref 40–50)
PH BLDV: 7.3 [PH] (ref 7.32–7.43)
PH BLDV: 7.33 [PH] (ref 7.32–7.43)
PH BLDV: 7.36 [PH] (ref 7.32–7.43)
PHOSPHATE SERPL-MCNC: 6.8 MG/DL (ref 2.5–4.5)
PLAT MORPH BLD: ABNORMAL
PLATELET # BLD AUTO: 213 10E3/UL (ref 150–450)
PO2 BLDV: 45 MM HG (ref 25–47)
POTASSIUM SERPL-SCNC: 3.6 MMOL/L (ref 3.4–5.3)
PROT SERPL-MCNC: 5.4 G/DL (ref 6.4–8.3)
RBC # BLD AUTO: 2.6 10E6/UL (ref 3.8–5.2)
RBC MORPH BLD: ABNORMAL
SAO2 % BLDV: 74.6 % (ref 70–75)
SAO2 % BLDV: 77.8 % (ref 70–75)
SAO2 % BLDV: 78.4 % (ref 70–75)
SODIUM SERPL-SCNC: 137 MMOL/L (ref 135–145)
WBC # BLD AUTO: 5.1 10E3/UL (ref 4–11)

## 2024-04-02 PROCEDURE — 999N000253 HC STATISTIC WEANING TRIALS

## 2024-04-02 PROCEDURE — 99207 PR NO BILLABLE SERVICE THIS VISIT: CPT | Performed by: NURSE PRACTITIONER

## 2024-04-02 PROCEDURE — 250N000013 HC RX MED GY IP 250 OP 250 PS 637: Performed by: INTERNAL MEDICINE

## 2024-04-02 PROCEDURE — 250N000013 HC RX MED GY IP 250 OP 250 PS 637

## 2024-04-02 PROCEDURE — 94640 AIRWAY INHALATION TREATMENT: CPT

## 2024-04-02 PROCEDURE — 258N000003 HC RX IP 258 OP 636: Performed by: INTERNAL MEDICINE

## 2024-04-02 PROCEDURE — 99233 SBSQ HOSP IP/OBS HIGH 50: CPT | Mod: FS | Performed by: NURSE PRACTITIONER

## 2024-04-02 PROCEDURE — 634N000001 HC RX 634: Mod: JZ | Performed by: INTERNAL MEDICINE

## 2024-04-02 PROCEDURE — 99291 CRITICAL CARE FIRST HOUR: CPT | Performed by: INTERNAL MEDICINE

## 2024-04-02 PROCEDURE — 999N000157 HC STATISTIC RCP TIME EA 10 MIN

## 2024-04-02 PROCEDURE — 250N000009 HC RX 250

## 2024-04-02 PROCEDURE — 85007 BL SMEAR W/DIFF WBC COUNT: CPT

## 2024-04-02 PROCEDURE — 85610 PROTHROMBIN TIME: CPT | Performed by: STUDENT IN AN ORGANIZED HEALTH CARE EDUCATION/TRAINING PROGRAM

## 2024-04-02 PROCEDURE — 200N000002 HC R&B ICU UMMC

## 2024-04-02 PROCEDURE — 83735 ASSAY OF MAGNESIUM: CPT | Performed by: STUDENT IN AN ORGANIZED HEALTH CARE EDUCATION/TRAINING PROGRAM

## 2024-04-02 PROCEDURE — 99207 PR NO BILLABLE SERVICE THIS VISIT: CPT | Performed by: PHYSICIAN ASSISTANT

## 2024-04-02 PROCEDURE — 82805 BLOOD GASES W/O2 SATURATION: CPT

## 2024-04-02 PROCEDURE — 80053 COMPREHEN METABOLIC PANEL: CPT

## 2024-04-02 PROCEDURE — 250N000012 HC RX MED GY IP 250 OP 636 PS 637

## 2024-04-02 PROCEDURE — 94799 UNLISTED PULMONARY SVC/PX: CPT

## 2024-04-02 PROCEDURE — 90937 HEMODIALYSIS REPEATED EVAL: CPT

## 2024-04-02 PROCEDURE — 250N000011 HC RX IP 250 OP 636: Performed by: STUDENT IN AN ORGANIZED HEALTH CARE EDUCATION/TRAINING PROGRAM

## 2024-04-02 PROCEDURE — 94660 CPAP INITIATION&MGMT: CPT

## 2024-04-02 PROCEDURE — 99207 PR NO BILLABLE SERVICE THIS VISIT: CPT

## 2024-04-02 PROCEDURE — 250N000011 HC RX IP 250 OP 636: Performed by: INTERNAL MEDICINE

## 2024-04-02 PROCEDURE — 999N000259 HC STATISTIC EXTUBATION

## 2024-04-02 PROCEDURE — 85014 HEMATOCRIT: CPT

## 2024-04-02 PROCEDURE — 250N000011 HC RX IP 250 OP 636

## 2024-04-02 PROCEDURE — 94640 AIRWAY INHALATION TREATMENT: CPT | Mod: 76

## 2024-04-02 PROCEDURE — 250N000013 HC RX MED GY IP 250 OP 250 PS 637: Performed by: STUDENT IN AN ORGANIZED HEALTH CARE EDUCATION/TRAINING PROGRAM

## 2024-04-02 PROCEDURE — 250N000012 HC RX MED GY IP 250 OP 636 PS 637: Performed by: NURSE PRACTITIONER

## 2024-04-02 PROCEDURE — 84100 ASSAY OF PHOSPHORUS: CPT | Performed by: STUDENT IN AN ORGANIZED HEALTH CARE EDUCATION/TRAINING PROGRAM

## 2024-04-02 PROCEDURE — 99233 SBSQ HOSP IP/OBS HIGH 50: CPT | Mod: FS | Performed by: PHYSICIAN ASSISTANT

## 2024-04-02 RX ORDER — SULFAMETHOXAZOLE AND TRIMETHOPRIM 400; 80 MG/1; MG/1
1 TABLET ORAL
Status: DISCONTINUED | OUTPATIENT
Start: 2024-04-02 | End: 2024-04-15 | Stop reason: HOSPADM

## 2024-04-02 RX ORDER — MIDODRINE HYDROCHLORIDE 5 MG/1
10 TABLET ORAL 2 TIMES DAILY PRN
Status: DISCONTINUED | OUTPATIENT
Start: 2024-04-02 | End: 2024-04-15 | Stop reason: HOSPADM

## 2024-04-02 RX ADMIN — IRON SUCROSE 50 MG: 20 INJECTION, SOLUTION INTRAVENOUS at 10:56

## 2024-04-02 RX ADMIN — LIDOCAINE 4% 1 PATCH: 40 PATCH TOPICAL at 20:13

## 2024-04-02 RX ADMIN — CALCIUM CARBONATE 600 MG (1,500 MG)-VITAMIN D3 400 UNIT TABLET 1 TABLET: at 08:20

## 2024-04-02 RX ADMIN — LEVALBUTEROL HYDROCHLORIDE 1.25 MG: 1.25 SOLUTION RESPIRATORY (INHALATION) at 12:33

## 2024-04-02 RX ADMIN — SODIUM CHLORIDE 300 ML: 9 INJECTION, SOLUTION INTRAVENOUS at 08:14

## 2024-04-02 RX ADMIN — LEVOTHYROXINE SODIUM 25 MCG: 0.03 TABLET ORAL at 06:29

## 2024-04-02 RX ADMIN — CALCIUM CARBONATE 600 MG (1,500 MG)-VITAMIN D3 400 UNIT TABLET 1 TABLET: at 17:59

## 2024-04-02 RX ADMIN — CALCIUM CARBONATE 600 MG (1,500 MG)-VITAMIN D3 400 UNIT TABLET 1 TABLET: at 12:21

## 2024-04-02 RX ADMIN — METOPROLOL TARTRATE 25 MG: 25 TABLET, FILM COATED ORAL at 20:13

## 2024-04-02 RX ADMIN — PIPERACILLIN SODIUM AND TAZOBACTAM SODIUM 2.25 G: 2; .25 INJECTION, POWDER, LYOPHILIZED, FOR SOLUTION INTRAVENOUS at 12:21

## 2024-04-02 RX ADMIN — OLANZAPINE 5 MG: 5 TABLET, ORALLY DISINTEGRATING ORAL at 22:35

## 2024-04-02 RX ADMIN — LIDOCAINE: 40 CREAM TOPICAL at 06:30

## 2024-04-02 RX ADMIN — Medication 2.5 MCG: at 20:12

## 2024-04-02 RX ADMIN — PREDNISONE 2.5 MG: 2.5 TABLET ORAL at 20:12

## 2024-04-02 RX ADMIN — CYCLOSPORINE 125 MG: 100 SOLUTION ORAL at 17:59

## 2024-04-02 RX ADMIN — Medication 40 MG: at 08:19

## 2024-04-02 RX ADMIN — PIPERACILLIN SODIUM AND TAZOBACTAM SODIUM 2.25 G: 2; .25 INJECTION, POWDER, LYOPHILIZED, FOR SOLUTION INTRAVENOUS at 03:03

## 2024-04-02 RX ADMIN — LEVALBUTEROL HYDROCHLORIDE 1.25 MG: 1.25 SOLUTION RESPIRATORY (INHALATION) at 07:40

## 2024-04-02 RX ADMIN — LEVALBUTEROL HYDROCHLORIDE 1.25 MG: 1.25 SOLUTION RESPIRATORY (INHALATION) at 20:29

## 2024-04-02 RX ADMIN — CYCLOSPORINE 125 MG: 100 SOLUTION ORAL at 08:19

## 2024-04-02 RX ADMIN — CHLORHEXIDINE GLUCONATE 0.12% ORAL RINSE 15 ML: 1.2 LIQUID ORAL at 08:19

## 2024-04-02 RX ADMIN — ACETYLCYSTEINE 2 ML: 100 SOLUTION ORAL; RESPIRATORY (INHALATION) at 12:33

## 2024-04-02 RX ADMIN — CLONIDINE HYDROCHLORIDE 0.1 MG: 0.1 TABLET ORAL at 22:35

## 2024-04-02 RX ADMIN — APIXABAN 2.5 MG: 2.5 TABLET, FILM COATED ORAL at 20:13

## 2024-04-02 RX ADMIN — PREDNISONE 5 MG: 5 TABLET ORAL at 08:19

## 2024-04-02 RX ADMIN — ACETYLCYSTEINE 2 ML: 100 SOLUTION ORAL; RESPIRATORY (INHALATION) at 20:29

## 2024-04-02 RX ADMIN — Medication 2.5 MCG: at 14:13

## 2024-04-02 RX ADMIN — LEVALBUTEROL HYDROCHLORIDE 1.25 MG: 1.25 SOLUTION RESPIRATORY (INHALATION) at 15:01

## 2024-04-02 RX ADMIN — SULFAMETHOXAZOLE AND TRIMETHOPRIM 1 TABLET: 400; 80 TABLET ORAL at 20:13

## 2024-04-02 RX ADMIN — SEVELAMER CARBONATE 0.8 G: 800 POWDER, FOR SUSPENSION ORAL at 20:12

## 2024-04-02 RX ADMIN — Medication: at 08:14

## 2024-04-02 RX ADMIN — EPOETIN ALFA-EPBX 8000 UNITS: 10000 INJECTION, SOLUTION INTRAVENOUS; SUBCUTANEOUS at 10:43

## 2024-04-02 RX ADMIN — Medication 5 ML: at 14:16

## 2024-04-02 RX ADMIN — Medication 40 MG: at 20:12

## 2024-04-02 RX ADMIN — AMIODARONE HYDROCHLORIDE 200 MG: 200 TABLET ORAL at 08:20

## 2024-04-02 RX ADMIN — ACETYLCYSTEINE 2 ML: 100 SOLUTION ORAL; RESPIRATORY (INHALATION) at 07:40

## 2024-04-02 RX ADMIN — SODIUM CHLORIDE 250 ML: 9 INJECTION, SOLUTION INTRAVENOUS at 08:13

## 2024-04-02 RX ADMIN — MIDODRINE HYDROCHLORIDE 10 MG: 5 TABLET ORAL at 06:29

## 2024-04-02 RX ADMIN — APIXABAN 2.5 MG: 2.5 TABLET, FILM COATED ORAL at 08:19

## 2024-04-02 RX ADMIN — ACETYLCYSTEINE 2 ML: 100 SOLUTION ORAL; RESPIRATORY (INHALATION) at 15:01

## 2024-04-02 ASSESSMENT — ACTIVITIES OF DAILY LIVING (ADL)
ADLS_ACUITY_SCORE: 39

## 2024-04-02 NOTE — PROGRESS NOTES
Nephrology Progress Note  04/02/2024         Assessment & Recommendations:   Sofie Rodriguez is a 63 year old year old female with ESKD, COPD s/p b/l lung transplant in 6/2022 with multiple complications, gastroparesis s/p GJ tube, GIB, chronic diarrhea, recurrent c-diff, FTT with inability to tolerate any tube feeds, R hip fx s/p ORIF 12/2023, admitted on 2/10 for initiation of TPN/lipids, transferred to ICU on 2/17 with worsening mental status and respiratory acidosis in spite of bipap, ultimately intubated on 2/18, being treated for PNA, also with volume overload in setting of high volume TPN. Persistent respiratory acidosis in spite of volume off, transferred to ICU for hypotension and respiratory failure, improved on bipap, now floor status again 3/1. Transferred to ICU 3/18 again with worsening hypercapnic respiratory failure. Transferred to floor 3/20, back to ICU 3/24    # ESKD - TTS, JANAY SAEZG, 3hr, 45.5 kg, [facility], Dr. Andres Fairbanks.  - dialyzed daily last week, now back to euvolemia and will continue HD per TTS schedule, extra runs if needed. Pt is agreeable to 3.5 hr runs if it means avoiding 4x/week dialysis  - Requires EMLA cream an hour before HD  - please avoid PICC which will compromise future dialysis accesses; a midline is much preferred for this patient    # Persistent respiratory acidosis: when off vent  - difficulty tolerating bipap due to claustrophobia, but wearing this lately  - balancing act between giving bicarb to maintain pH in setting of severe persistent respiratory acidosis with bicarb quickly converting CO2 and worsening overall status  - recommend stopping PO bicarb, ok to restart for pH < 7.1 but would stop again once pH is 7.2; will continue to provide bicarb with dialysis  - transplant pulm following  - re-intubated 3/24 for bronch/BAL (NGTD), status improving with less trach support. Hoping to extubate today    # Aflutter: on amio and BB  # Volume/BP: EDW 45-45.5 kg.  "Anuric; on metoprolol soln 25 mg bid   - CXR 3/25 with likely pulm edema, dialyzed daily last week, now appears euvolemic and back to EDW  - weights are quite variable, ?accuracy  - 3L UF today     # Nutrition: on [employer] tube feeds     # Anemia 2/2 ESKD  On Venofer 50 qwk, Mircera last dose 1/9/2024  - hgb 8's  - iron labs 3/31: ferritin 1007, iron 34, IS 26 (at goal)  - Will continue Venofer 50 mcg q week (Tuesday)  - continue epogen 8000 units via HD    # BMD:   - Ca 8-9's, phos 6.8, alb 3.5  - restarted sevelamer          Recommendations were communicated to primary team via note     RABIA Moctezuma   Division of Renal Disease and Hypertension  [phone]    Interval History :   Seen bedside, may be extubated today. Pulling 3L without issue. No n/v, CP, chills    Review of Systems:   4 point ROS neg other than as noted above    Physical Exam:   I/O last 3 completed shifts:  In: 1670 [I.V.:330; NG/GT:500]  Out: -    /69   Pulse 73   Temp 97.8  F (36.6  C)   Resp 19   Ht 1.57 m (5' 1.81\")   Wt 48.1 kg (106 lb 0.7 oz)   SpO2 100%   BMI 19.51 kg/m       GENERAL APPEARANCE: on vent, alert and awake  PULM: diminished, on vent  CV: RRR    Edema: Trace peripheral  GI: soft   INTEGUMENT: no cyanosis, no rashes on exposed skin  NEURO: a/o3  Access Left AVG     Labs:   All labs reviewed by me  Electrolytes/Renal -   Recent Labs   Lab Test 04/02/24  0306 04/01/24  0750 04/01/24  0515 03/31/24  0456     --  138 140   POTASSIUM 3.6  --  3.5 3.5   CHLORIDE 94*  --  96* 99   CO2 26  --  27 29   BUN 62.5*  --  47.7* 27.4*   CR 4.38*  --  3.47* 2.20*   * 89 126* 130*   ESTUARDO 8.8  --  9.0 8.8   MAG 2.4*  --  2.4* 1.6*   PHOS 6.8*  --  6.1* 4.4       CBC -   Recent Labs   Lab Test 04/02/24 0306 04/01/24  0515 03/31/24  0456   WBC 5.1 4.1 4.7   HGB 8.8* 8.7* 8.8*    211 204       LFTs -   Recent Labs   Lab Test 04/02/24 0306 04/01/24  0515 03/31/24  0456   ALKPHOS 80 80 81   BILITOTAL 0.2 0.2 " 0.3   ALT 10 14 13   AST 13 14 17   PROTTOTAL 5.4* 5.4* 5.6*   ALBUMIN 3.4* 3.5 3.4*       Iron Panel -   Recent Labs   Lab Test 03/31/24  0456 09/26/22  0555 09/03/22  1039   IRON 34* 54 21*   IRONSAT 26 22 9*   CARLOS 1,007* 769* 343*         Imaging:  All imaging studies reviewed by me.     Current Medications:  Current Facility-Administered Medications   Medication Dose Route Frequency Provider Last Rate Last Admin    acetylcysteine (MUCOMYST) 10 % nebulizer solution 2 mL  2 mL Nebulization 4x daily Corinna Curry RT   2 mL at 04/02/24 0740    amiodarone (PACERONE) tablet 200 mg  200 mg Oral or Feeding Tube Daily Sara Jorge APRN CNP   200 mg at 04/02/24 0820    apixaban ANTICOAGULANT (ELIQUIS) tablet 2.5 mg  2.5 mg Oral BID Betty Diaz MD   2.5 mg at 04/02/24 0819    calcium carbonate-vitamin D (CALTRATE) 600-10 MG-MCG per tablet 1 tablet  1 tablet Per J Tube TID w/meals Maribell Cloud MD   1 tablet at 04/02/24 0820    chlorhexidine (PERIDEX) 0.12 % solution 15 mL  15 mL Mouth/Throat Q12H Ting Aceves MD   15 mL at 04/02/24 0819    cloNIDine (CATAPRES) tablet 0.1 mg  0.1 mg Oral At Bedtime Virginia Fowler MD   0.1 mg at 04/01/24 2205    [Held by provider] cyanocobalamin (VITAMIN B-12) tablet 500 mcg  500 mcg Per Feeding Tube Daily Maribell Cloud MD   500 mcg at 03/22/24 0754    cycloSPORINE modified (GENERIC EQUIVALENT) microemulsion solution 125 mg  125 mg Per G Tube BID IS Sara Grayson NP   125 mg at 04/02/24 0819    heparin lock flush 10 UNIT/ML injection 5-20 mL  5-20 mL Intracatheter Q24H Betty Diaz MD   5 mL at 04/01/24 1534    levalbuterol (XOPENEX) neb solution 1.25 mg  1.25 mg Nebulization 4x Daily Raiza Jurado MD   1.25 mg at 04/02/24 0740    levothyroxine (SYNTHROID/LEVOTHROID) tablet 25 mcg  25 mcg Oral QAM AC Kalpana Merino, RUFUS CNP   25 mcg at 04/02/24 0629    Lidocaine (LIDOCARE) 4 % Patch 1 patch  1 patch Transdermal Q24h Maribell Cloud MD    1 patch at 04/01/24 2049    liothyronine (CYTOMEL) half-tab 2.5 mcg  2.5 mcg Oral BID Kalpana Merino APRN CNP   2.5 mcg at 04/01/24 2115    metoprolol tartrate (LOPRESSOR) tablet 25 mg  25 mg Per J Tube BID Ting Aceves MD   25 mg at 04/01/24 2049    midodrine (PROAMATINE) tablet 10 mg  10 mg Oral 3 times per day on Tuesday Thursday Saturday Ting Aceves MD   10 mg at 04/02/24 0629    OLANZapine zydis (zyPREXA) ODT tab 5 mg  5 mg Oral At Bedtime Betty Diaz MD   5 mg at 04/01/24 2205    pantoprazole (PROTONIX) 2 mg/mL suspension 40 mg  40 mg Per J Tube BID Maribell Cloud MD   40 mg at 04/02/24 0819    piperacillin-tazobactam (ZOSYN) 2.25 g vial to attach to  ml bag  2.25 g Intravenous Q6H Virginia Fowler MD   2.25 g at 04/02/24 0303    predniSONE (DELTASONE) tablet 5 mg  5 mg Per J Tube QAM Maribell Cloud MD   5 mg at 04/02/24 0819    And    predniSONE (DELTASONE) tablet 2.5 mg  2.5 mg Per J Tube QPM Maribell Cloud MD   2.5 mg at 04/01/24 2049    [Held by provider] sevelamer carbonate (RENVELA) Packet 0.8 g  0.8 g Oral BID Maribell Cloud MD   0.8 g at 02/17/24 0843    sodium chloride (PF) 0.9% PF flush 10 mL  10 mL Intracatheter Q8H Betty Diaz MD   10 mL at 04/02/24 0312    sodium chloride (PF) 0.9% PF flush 10-40 mL  10-40 mL Intracatheter Q8H Betty Diaz MD   10 mL at 04/02/24 0634    sulfamethoxazole-trimethoprim (BACTRIM) 400-80 MG per tablet 1 tablet  1 tablet Oral Once per day on Tuesday Thursday Saturday Claribel Franks MD         Current Facility-Administered Medications   Medication Dose Route Frequency Provider Last Rate Last Admin    dextrose 10% infusion   Intravenous Continuous PRN Miguel Angel Stone MD        Patient is already receiving anticoagulation with heparin, enoxaparin (LOVENOX), warfarin (COUMADIN)  or other anticoagulant medication   Does not apply Continuous PRN Ting Aceves MD        Stop Heparin 60 minutes before end of  treatment   Does not apply Continuous CARENN Claribel Franks MD Dawn M Fuglestad, PA I, Rayna Boland, saw and evaluated this patient as part of a shared visit.  I have reviewed and discussed with the advanced practice provider their history, physical and plan.  I have personally performed the substantive portion of the medical decision making for this visit - please see the EMILY's documentation for the full details  I personally reviewed the vital signs, medications, labs and imaging.    Key findings and management decisions made by me:  ESRD, respiratory failure, dialysis today for volume and solute control, hoping for extubation after.Volume status seems stable, 3 liter UF today    Rayna Boland  Date of Service (when I saw the patient): 4/2

## 2024-04-02 NOTE — PROGRESS NOTES
MEDICAL ICU PROGRESS NOTE  04/02/2024      Date of Service (when I saw the patient): 04/02/2024    ASSESSMENT: Sofie Rodriguez is a 63 year old female with PMH COPD s/p bilateral lung transplant 6/28/22 c/b hemidiaphragm palsy and recurrent pneumonias, gastroparesis and small bowel dysmotility complicated by severe malnutrition now s/p PEG/J, ESRD on M/W/F HD, recent R femoral fx s/p ORIF, chronic diarrhea, recurrent c-diff, failure to thrive with inability to tolerate any tube feeds, who was admitted to [facility] on 2/10/24 for concerns over malnutrition and TPN initiation via portacath. Course complicated by recurrent and progressive acute on chronic hypoxic and hypercarbic respiratory failure requiring multiple ICU admissions and intubation 2/18-2/19, 2/28-2/29, 3/18-3/20, for continuous BiPAP. Again with worsening respiratory acidosis and hypercarbia, concern for infection vs acute rejection, requiring transfer back to ICU 3/20/2024 for intubation and bronchoscopy.    CHANGES and MAJOR THINGS TODAY:     - Pt has made a decision and that she wants to proceed with extubation, intubation with trach if needed  - HD this am, then do T-piece for 2-3 hours, check vbg during hour 2-3, if not hypercarbic will proceed with extubation, pt needs to use BIPAP overnight, she is high risk for reintubation d/t hypercarbia.    PLAN:     Neuro:  # Acute pain  # Sedation  Intubated and awake, alert. Denies new or acute pain. Uses lidocaine and heating pads as needed for pain management.   - Precedex weaned off   - RASS goal 0 to -1  - Tylenol Q4 prn  - Lidocaine patch prn  - Heating pads prn    # Hx of Anxiety   # Claustrophobia  Reports claustrophobia contributing to limited compliance with BiPAP. Has seen health psych on 3/20, recommended grounding exercise (5-4-3-2-1) for use on BiPAP.   - Zyprexa 5mg at bedtime   - Atarax 25 mg TID PRN for anxiety  - Ativan 0.5 mg PRN for anxiety  - Clonidine 0.1 mg at bedtime     #  B/L Neuropathic Pain   New pain b/l this hospitalization. Last A1c <4.2 (7/11/23). Consider possibly 2/2 ESRD. TSH wnl. B12 and B1 elevated, B6 normal. Copper low (56.3), zinc mildly low (48.3).   - Consider gabapentin in the coming days as needed  - Neuro Recs:  - No obvious clinical history or exam findings to suggest neurologic/neuromuscular causes of respiratory weakness  - Neuropathy labs currently pending  - Holding B12 supplement (supra-therapeutic 3/15)    Pulmonary:  # Acute on chronic hypoxic and hypercarbic respiratory failure  # Recurrent PNAs, concern for acute infection vs acute rejection  # S/p BSLT 6/8/22 for COPD  # R hemidiaphragm palsy   # Positive DSA   # Suspected CARLEE  # PTA nocturnal O2, 2L   S/p bilateral lung transplant 6/28/22 for COPD. Was admitted 2/10 to address malnutrition and admitted to ICU on 2/17 with hypoxic hypercarbic respiratory failure. Intubated on 2/18 due to worsening oxygen requirements, likely due to pulmonary edema given hx of ESRD requiring HD vs infection given hx of lung transplant, immunosuppressed status, and recurrent pneumonias. Required intubation 2/17 - 2/19. Ongoing respiratory acidosis despite BiPAP/AVAPS, claustrophobic while using to intermittent compliance. Neurology consulted and MRI r/o central cause problem for respiratory drive. Started on AVAPS with improvement in mental status and VBG, and patient transferred back to medicine floor on 2/29. 3/08 SNIFF with no definite paradoxical movement of bilateral hemidiaphragm. Transferred back to ICU 3/18-3/20 d/t hypercarbia, severe respiratory acidosis, but did not require intubation during this time. Issues with anxiety/claustrophobia while using the mask, Atarax has helped though Zyprexa led to hallucinations. Even with pH this low, and pCO2 that high, mentation remains stable. CT chest 3/24 w/ scattered opacity throughout all lungs; dilated pulm artery. On BiPAP 16/5 at time of transfer. Transplant pulm  concerned for infectious vs acute rejection picture, recommending intubation + bronchoscopy, have been following since. Worry there may be an idiopathic picture causing decreased respiratory drive. Volume overload + pulmonary edema complicating picture, as patient has had low pressures limiting HD runs.   - Daily VBG  - Intubated on 03/24   - Transplant pulm following, appreciate recs               - 3/18 prospera in process  - Immunosuppression:   >Prednisone 5 mg every morning, 2.5 mg every afternoon  >Cyclosporine 200mg BID (Stopped tacrolimus 3/10)   >Overnight oximetry study suggestive of O2 vs CPAP need, as did require up to 2LPM overnight to prevent hypoxia  >Try to minimize O2 to preserve respiratory drive, will give IVIG for DSA+   >D/c'd theophylline 3/3/24 due to difficulty attaining therapeutic levels (started by ICU), could consider Modafinil   >Repeat metabolic CART study ordered by Transplant Pulm   - Airway clearance/nebs:   - Xopenex neb BID  - Hypertonic saline nebs q 3 hours PRN   - Volume removal with iHD  - Sleep clinic eval at discharge for suspected CARLEE  - Limit medications that would depress respiration  - Continue PST 8/5 at not 5/5 as long as tolerated   - HD this am, then do T-piece for 2-3 hours, check vbg during hour 2-3, if not hypercarbic will proceed with extubation, pt needs to use BIPAP overnight      Vent Mode: PS  (Pressure Support)  FiO2 (%): 30 %  PEEP (cm H2O): 5 cmH2O  Pressure Support (cm H2O): 5 cmH2O  Resp: 20    # Goals of Care  - 3/15 Care conference on with Transplant Pulm, [facility] Care, Medicine, daughters (Charity, Julia) and Transplant SW. Overall medical updates provided and Qs answered. With declining respiratory acidosis on labs, discussed code status 3/18. Patient initially not desiring any escalation of her care to ICU/intubation with frustration re overall course. However, after discussing with her daughter on the phone she and family elected to remain full code and  agreed to ICU admission and intubation if necessary.   - 3/29 Care conference: Laid out a few options for family and patient to consider with qs answered. Examples being we could attempt to extubate to BiPAP but plan should be established prior to extubation that if patient decompensates and requires reintubation then likely pursue trach versus more comfort approach. Other option is going straight for trach now. Patient and family (daughter Julia and son present on Ipad) considering the options and had a lot of questions about trach and what that would look like long-term, which was laid out for the family and patient.   - Pt has made a decision and that she wants to proceed with extubation, intubation with trach if needed.     Cardiovascular:  # A-flutter (recurrent on 3/17)  # A-fib, intermittent  # HTN  # HFpEF  Noted Afib/flutter intermittently throughout admission. Was started on amiodarone bolus with drip and transitioned to PO dosing.  2/17 TTE: LVEF 55-60%, global RV function normal, no significant valvular abnormalities. Briefly required levophed and midodrine for HD but otherwise stable off pressors.   - MAP goal > 65 mmHg   - Continue Metoprolol tartrate dose 25 mg BID (hold if MAP<65)  - Continue amiodarone 400mg BID PO, dose decreased to 200mg starting 4/2  - Midodrine with HD      GI/Nutrition:  # Severe malnutrition   # Failure to thrive  # Hypoalbuminemia  # Gastroparesis, small bowel dysmotility  # S/P PEG/J with intolerance of enteral nutrition  # Chronic osmotic diarrhea/SIBO s/p Rifaximin  # Recurrent C.Diff (3rd ep 3/12/24)    # GERD  Patient with gastroparesis (presumed due to vagal injury) and small bowel dysmotility complicated by unintentional 40lb weight loss over the past year and now severe malnutrition. Previously intolerant to oral food intake due to nausea. Was initially admitted for portacath and TPN initiation since 2/13. Intermittently tolerating feeds without n/v from 2/20.  Per  GI, no ongoing concerns for SIBO as of 2/23. As of 3/11 transplant pulmonology is worried that TPN is not a realistic plan to continue at home and would like to transition back to TF (RD oncerned pt is not absorbing any oral intake). TPN discontinued 3/16. Transplant pulm also worried about mucosal toxicity. Recurrent C.diff 3/12, Fidaxomicin x 10 days (3/13-3/22), with improvement in diarrhea. Transplant pulm requesting repeat colonoscopy w/ Bx after completion of c.diff treatment, to r/o toxicity or other reason that diarrhea is present.   - Nutrition consulted, appreciate assistance  - Continue TF at goal rate 35 ml/hr via PEG/J          - Consider reconsult GI week of 3/25/after metabolic CART study, for future colonoscopy +/- biopsy if diarrhea returns   - PPI BID  - Bowel regimen PRN  - Confirmed Osmotic diarrhea with stool studies--> Likely not infectious, continue adjusting TF and loperamide PRN      Renal/Fluids/Electrolytes:  # ESRD on HD  # Mild hyperphosphatemia  Patient is ESRD on T/Th/Sat HD as outpt, now approx M/W/F inpatient. HD tolerating until 3/23. Briefly required extra Monday runs, not since being off TPN. She would prefer longer sessions to more days.  - Midodrine 10mg q8h with dialysis days   - Currently holding Sevelamer but may need to resume in the coming days per Nephrology   - CBC and CMP daily  - Strict I/Os  - Daily weight   - Renally adjust medications      Endocrine:  # Hyperglycemia, stress induced  Has been euglycemic for the past few days. Not on insulin.  - Hypoglycemia protocol     # Hypothyroidism  Patient with new (2/17/24) low T4 at 0.64 and TSH at 6.5, concerning for new hypothyroidism vs sick thyroid syndrome. TSH with appropriate response, more consistent with elevation in the setting of acute illness. ICU team on 2/28 recommended initiation of treatment for possible hypothyroid as this can improve respiratory muscle function in the setting of weakness and malnutrition.  3/7, 3/15, 3/20 repeat thyroid function WNL.  - Continue liothyronine + levothyroxine -- note that prolonged combination therapy is not optimal & regimen should be re-evaluated (likely stop liothyronine, up-titrate levothyroxine) in post-acute setting      ID:  # Recurrent pneumonia, concern for acute infection vs rejection  # Recurrent C.Diff colitis (3rd ep 3/12/24)  # Hx EBV viremia   # Hypogammaglobulinemia  # Chronic immunosuppression / lung transplant  Empirically treated for pneumonia  - , RVP and cultures no growth to date. Recurrent C.Diff Positive 3/12, s/p PO Fidaxomicin 200 mg BID for 10 days (3/13-3/22) with improvement in diarrhea. Remains afebrile, leukocytosis resolved.  Ig, s/p IVIG.  EBV 27K (decreased compared to prior).     - Follow up BAL and blood cultures from 3/24  - Continue empiric antibiotic as below    Antibiotics:  Zosyn ( - , 3/24 - )   Bactrim (MWF, PJP ppx, previously on Dapsone)  Vancomycin ( - , 3/24-3/25)  Micafungin (- , 3/24 x1)  Fidaxomicin (3/13-3/22)    Micro:   - BAL 3/24:   - Cell count w diff: PMN 75%, lymphocytes 5, monocytes 19, eos 1  - No organisms seen on Gram stain  - RVP, HSV, Histoplasma neg  - Fungal & bacterial cultures NGTD  - EBV, CMV, PJP, Aspergillus, Legionella, Mycoplasma, AFB in process  - Bcx 3/24 NGTD    Hematology:    #Acute on chronic anemia / ESRD  Hgb stable, with no acute signs of bleeding.   - Daily CBC  - Transfuse for Hgb < 7  - Continue Epogen with dialysis, held in setting of concern for acute infection   - Continue Venofer 50 mcg qweek with dialysis, held in setting of concern for acute infection     #Steal physiology of LUE dialysis access fistula  LUE arterial US obtained  with concern for LUE edema. US of fistula demonstrated steal physiology. Discussed with nephrology, and vascular surgery consulted on . Vascular surgery has only minor concerns for steal symptoms and given that  the AVF is working well, they are okay with outpatient fistulograms/ venoplasty with wrist brachial index and PPG's, unless new concerns arise.  - Plan for outpatient workup as above; nephrology in agreement with outpatient workup.    Musculoskeletal:  # Right hip fracture s/p ORIF (December 2023)   - PT/OT to eval and treat      Skin:  # Left upper extremity unilateral edema, improving   # Bilateral pedal and ankle edema, improving  Edema due to third spacing due to hypoalbuminemia vs HF. BNP >41999 at admission, Echo on 2/18 shows EF of 55-60% with collapsible IVC. Extremity edema 2/2 to hypoalbuminemia. Upper limb duplex USG on 2/18 negative for DVT.  USG left arm on 2/21 shows steal physiology and Vascular Surgery consulted (see Heme section). Overall edema in extremities have improved significantly with dialysis.    - Elevation and wrapping of only lower legs as needed and increased UF per nephrology for volume management; no wrapping of left upper extremity with dialysis fistula      General Cares/Prophylaxis:    DVT Prophylaxis: Apixaban  GI Prophylaxis: PPI  Restraints: N/A  Family Communication: Daughters Charity & Julia  Code Status: FULL      Lines/tubes/drains:  - PEG/J  - PICC line in RUE   - PIV x1    Disposition:  - Medical ICU      Patient seen and findings/plan discussed with medical ICU staff, RUFUS Peter CNP     Clinically Significant Risk Factors              # Hypoalbuminemia: Lowest albumin = 2.6 g/dL at 2/18/2024  5:13 AM, will monitor as appropriate  # Coagulation Defect: INR = 1.35 (Ref range: 0.85 - 1.15) and/or PTT = N/A, will monitor for bleeding            # Severe Malnutrition: based on nutrition assessment      # Financial/Environmental Concerns: none            ====================================  INTERVAL HISTORY:   Awake and following commands. Writing in sentences. No acute events overnight.    OBJECTIVE:   1. VITAL SIGNS:   Temp:  [97.5  F (36.4   C)-98.4  F (36.9  C)] 97.8  F (36.6  C)  Pulse:  [58-86] 85  Resp:  [13-25] 20  BP: (103-156)/(45-84) 125/71  Cuff Mean (mmHg):  [80] 80  FiO2 (%):  [30 %] 30 %  SpO2:  [92 %-100 %] 100 %  Vent Mode: PS  (Pressure Support)  FiO2 (%): 30 %  PEEP (cm H2O): 5 cmH2O  Pressure Support (cm H2O): 5 cmH2O  Resp: 20  PIP 25    2. INTAKE/ OUTPUT:   I/O last 3 completed shifts:  In: 1670 [I.V.:330; NG/GT:500]  Out: -   Net -2106 past 24h    3. PHYSICAL EXAMINATION:  General: Awake, alert & oriented, resting in bed, communicating via writing  HEENT: Mucous membranes moist  Neuro: Awake and alert, no focal deficits, moving all extremities to command and spontaneously  Pulm/Resp: Coarse breath sounds bilaterally, diminished on R>L, no accessory muscle use  CV: RRR, S1/S2 without m/r/g; pedal pulses 2+ without LE edema  Abdomen: Soft, non-distended, non-tender  : Rectal tube in place  Incisions/Skin: PICC and PEG site without surrounding erythema, no rashes noted    4. LABS:   Venous Blood Gas  Recent Labs   Lab 04/02/24  0306 04/01/24  0515 03/31/24  0456 03/30/24  1418   PHV 7.30* 7.32 7.34 7.31*   PCO2V 62* 61* 60* 62*   PO2V 45 49* 44 47   HCO3V 31* 31* 32* 31*   LINDY 3.0 4.0* 5.4* 4.0*   O2PER 30 30 30 30     Complete Blood Count   Recent Labs   Lab 04/02/24  0306 04/01/24  0515 03/31/24  0456 03/30/24  0336   WBC 5.1 4.1 4.7 5.2   HGB 8.8* 8.7* 8.8* 8.7*    211 204 180     Basic Metabolic Panel  Recent Labs   Lab 04/02/24  0306 04/01/24  0750 04/01/24  0515 03/31/24  0456 03/30/24  0825 03/30/24  0336     --  138 140  --  138   POTASSIUM 3.6  --  3.5 3.5  --  3.8   CHLORIDE 94*  --  96* 99  --  98   CO2 26  --  27 29  --  26   BUN 62.5*  --  47.7* 27.4*  --  48.7*   CR 4.38*  --  3.47* 2.20*  --  3.25*   * 89 126* 130*   < > 113*    < > = values in this interval not displayed.     Liver Function Tests  Recent Labs   Lab 04/02/24  0306 04/01/24  0515 03/31/24  0456 03/30/24  0336   AST 13 14 17 18   ALT  10 14 13 14   ALKPHOS 80 80 81 92   BILITOTAL 0.2 0.2 0.3 0.3   ALBUMIN 3.4* 3.5 3.4* 3.5   INR 1.35* 1.29* 1.27* 1.24*     Coagulation Profile  Recent Labs   Lab 04/02/24  0306 04/01/24  0515 03/31/24  0456 03/30/24  0336   INR 1.35* 1.29* 1.27* 1.24*       5. RADIOLOGY:   No results found for this or any previous visit (from the past 24 hour(s)).

## 2024-04-02 NOTE — PROGRESS NOTES
[facility], Procedure Note          Extubation:       Sofie Rodriguez  MRN# [id number]   April 2, 2024         Patient extubated at: 3:16 PM   Supplemental Oxygen: Via nasal cannula at 4 percent   Cough: The cough is strong   Secretion Mode: Able to clear   Secretion Amount: Small amount, thin and clear in color   Respiratory Exam:: Breath sounds: coarse crackles     Location: bilaterally   Skin Exam:: Patient color: pink   Patient Status: Currently appears comfortable   Venous Blood Gasses: pH: 7.36     pO2: 45     pCO2: 58     HOC3: 33         Recorded by Aida Sosa, RT

## 2024-04-02 NOTE — PROGRESS NOTES
ICU update:    # acute on chronic hypercarbic respiratory failure  - Did well on 2 hour t-piece trial with a CO2 of 59; typically in 60, pt awake following commands with stable hemodynamics, has been pressure supporting on vent 8/5 overnight and 5/5 during day. Decision made to extubate pt, with the understanding that if reintubation is necessary that pt will likely require tracheostomy placement (pt and family aware of this).  - BIPAP at hs and naps at all times.  - High risk for reintubation given hx of hypercarbia   - VBG as needed  - Will need to obtain home NIPPV machine and work to transition to long-term device while inpatient   - NPO; speech eval tomorrow    Sara Jorge, CNP

## 2024-04-02 NOTE — PLAN OF CARE
ICU End of Shift Summary. See flowsheets for vital signs and detailed assessment.    Changes this shift: Dialyzed for 3L metoprolol held during run tolerated well.  Post iHD run pt placed on T-piece 40% 40L flow, VBG obtained during this time. Extubated at 1516 to 4L nasal cannula. Pt has not offered any complaints.    Plan:  To be place on BiPAP at HS or with any napping.  Continue to monitor respiratory status closely notify the team of any changes in status.  Goal Outcome Evaluation: afebrile, HD stable denies pain.           Overall Patient Progress: improvingOverall Patient Progress: improving    Outcome Evaluation: Extubated to 4L

## 2024-04-02 NOTE — PROGRESS NOTES
Transplant Social Work Services Progress Note      Date of Initial Social Work Evaluation: Pt well known to transplant team through the transplant program.  Collaborated with: Pt, her daughter, Julia and son- Fletcher. And the medical team- ICU/Transplant physician- Dr. Franks and Dr. Pink from Palliative Care.    Data: Family care conference held in pts room to discuss goals of care and the plan for getting Sofie off the ventilator.   Intervention: Dr. Franks presented the options to Sofie and her family. Her children were present via the ipad in the room. Dr. Franks explained that Sofie is in need of the ventilator to keep her CO2 levels low. She was using bipap before getting intubated but was not able to tolerate the use of the bipap face mask for long enough periods of time. Now, she is stable on the vent and a plan needs to be made to get her off of the vent or a trach placed for long term vent management. The medical team is willing to give extubation a try but if she cannot maintain her CO2 levels, then they feel a tracheostomy would benefit her best. This would allow her CO2 levels to be managed via the trach vs a bipap mask over her face.   If Sofie were to be given the trach, this could make her discharge situation very complicated as there are very limited options for out patient dialysis for pts with tracheostomies. Only trach certified dialysis centers can manage trached patients. She and  her family live in [place], [place] so this could also make this difficult, if there is not a dialysis center near  that can manage her with a trach.  Dr. Franks asked Sofie and her family to think about what they want to do.   Options are: Do a trial extubation with bipap to manage her CO2 and if that fails, either trach her for re-ventilation or change her goals of care and allow a natural progression towards death without re-intubation.     Assessment: Sofie was able to participate well in this conversation. She asked very good  questions and indicated that she wants to be able to live a normal life at home and be able to eat. She understood this could be diffcult with a trach. She will need to discuss further with her family. Her children asked good questions and indicated they understood the dilemma.   Education provided by [facility]: Educated the medical team and the pt/family on the limitations in discharge planning.  Plan:    Discharge Plans in Progress: Ongoing.    Barriers to d/c plan: Pt may need a trach and if so, will need a plan for dialysis at a trach certified facility.    Follow up Plan: S[facility]NC[facility]Z will continue to explore dialysis options and then will educate the team and pt on these potential options.    Nicolle Romero, [facility]  Lung Transplant [facility]  [phone]  [phone]  ** Covering for [employer]

## 2024-04-02 NOTE — PLAN OF CARE
ICU End of Shift Summary. See flowsheets for vital signs and detailed assessment.    Changes this shift: Patient alert and cooperative all night and able to make needs known via writing. VSS, afebrile, plan to do HD first thing in the morning and then work towards the plan to extubate. Only concern observed by overnight nurse could be oral secretions but patient is very independent in her oral suctioning and has a strong cough. 4 loose/water BM's 1 incontinent- patient says her body did not give her time to prepare or call for the incontinent episode. Otherwise patient was able t get up safely to the commode without concern. Perineum reddened and irritated- water and dry wipes used to decrease irritation and barrier cream applied.     Plan: Hopefully extubate today!    Goal Outcome Evaluation:      Plan of Care Reviewed With: patient    Overall Patient Progress: improvingOverall Patient Progress: improving    Outcome Evaluation: VSS, afebrile, P/S all day and night      Problem: Adult Inpatient Plan of Care  Goal: Plan of Care Review  Description: The Plan of Care Review/Shift note should be completed every shift.  The Outcome Evaluation is a brief statement about your assessment that the patient is improving, declining, or no change.  This information will be displayed automatically on your shift  note.  Flowsheets (Taken 4/2/2024 0516)  Outcome Evaluation: VSS, afebrile, P/S all day and night  Plan of Care Reviewed With: patient  Overall Patient Progress: improving  Goal: Absence of Hospital-Acquired Illness or Injury  Intervention: Identify and Manage Fall Risk  Recent Flowsheet Documentation  Taken 4/2/2024 0400 by Diane Rivera, RN  Safety Promotion/Fall Prevention:   activity supervised   nonskid shoes/slippers when out of bed   room near nurse's station   safety round/check completed  Taken 4/2/2024 0000 by Diane Rivera, RN  Safety Promotion/Fall Prevention:   activity supervised   nonskid shoes/slippers  when out of bed   room near nurse's station   safety round/check completed  Taken 4/1/2024 2000 by Diane Rivera RN  Safety Promotion/Fall Prevention:   activity supervised   nonskid shoes/slippers when out of bed   room near nurse's station   safety round/check completed  Intervention: Prevent Skin Injury  Recent Flowsheet Documentation  Taken 4/2/2024 0400 by Diane Rivera RN  Body Position: position changed independently  Skin Protection:   adhesive use limited   skin to skin areas padded   skin to device areas padded   transparent dressing maintained  Device Skin Pressure Protection:   absorbent pad utilized/changed   positioning supports utilized   pressure points protected   skin-to-device areas padded   skin-to-skin areas padded   tubing/devices free from skin contact  Taken 4/2/2024 0000 by Diane Rivera RN  Body Position: position changed independently  Skin Protection:   adhesive use limited   skin to skin areas padded   skin to device areas padded   transparent dressing maintained  Device Skin Pressure Protection:   absorbent pad utilized/changed   positioning supports utilized   pressure points protected   skin-to-device areas padded   skin-to-skin areas padded   tubing/devices free from skin contact  Taken 4/1/2024 2200 by Diane Rivera RN  Body Position: position changed independently  Taken 4/1/2024 2000 by Diane Rivera RN  Body Position: position changed independently  Skin Protection:   adhesive use limited   skin to skin areas padded   skin to device areas padded   transparent dressing maintained  Device Skin Pressure Protection:   absorbent pad utilized/changed   positioning supports utilized   pressure points protected   skin-to-device areas padded   skin-to-skin areas padded   tubing/devices free from skin contact  Intervention: Prevent and Manage VTE (Venous Thromboembolism) Risk  Recent Flowsheet Documentation  Taken 4/2/2024 0400 by Diane Rivera RN  VTE Prevention/Management:  SCDs (sequential compression devices) on  Taken 4/2/2024 0000 by Diane Rivera RN  VTE Prevention/Management: SCDs (sequential compression devices) on  Taken 4/1/2024 2000 by Diane Rivera RN  VTE Prevention/Management: SCDs (sequential compression devices) on  Intervention: Prevent Infection  Recent Flowsheet Documentation  Taken 4/2/2024 0400 by Diane Rivera RN  Infection Prevention:   hand hygiene promoted   personal protective equipment utilized   rest/sleep promoted   environmental surveillance performed   equipment surfaces disinfected   single patient room provided  Taken 4/2/2024 0000 by Diane Rivera RN  Infection Prevention:   hand hygiene promoted   personal protective equipment utilized   rest/sleep promoted   environmental surveillance performed   equipment surfaces disinfected   single patient room provided  Taken 4/1/2024 2000 by Diane Rivera RN  Infection Prevention:   hand hygiene promoted   personal protective equipment utilized   rest/sleep promoted   environmental surveillance performed   equipment surfaces disinfected   single patient room provided  Goal: Optimal Comfort and Wellbeing  Intervention: Provide Person-Centered Care  Recent Flowsheet Documentation  Taken 4/2/2024 0400 by Diane Rivera RN  Trust Relationship/Rapport:   care explained   choices provided   emotional support provided   questions answered   thoughts/feelings acknowledged  Taken 4/2/2024 0000 by Diane Rivera RN  Trust Relationship/Rapport:   care explained   choices provided   emotional support provided   questions answered   thoughts/feelings acknowledged  Taken 4/1/2024 2000 by Diane Rivera RN  Trust Relationship/Rapport:   care explained   choices provided   emotional support provided   questions answered   thoughts/feelings acknowledged     Problem: Fall Injury Risk  Goal: Absence of Fall and Fall-Related Injury  Intervention: Identify and Manage Contributors  Recent Flowsheet Documentation  Taken  4/2/2024 0400 by Diane Rivera RN  Medication Review/Management: medications reviewed  Taken 4/2/2024 0000 by Diane Rivera RN  Medication Review/Management: medications reviewed  Taken 4/1/2024 2000 by Diane Rivera RN  Medication Review/Management: medications reviewed  Intervention: Promote Injury-Free Environment  Recent Flowsheet Documentation  Taken 4/2/2024 0400 by Diane Rivera RN  Safety Promotion/Fall Prevention:   activity supervised   nonskid shoes/slippers when out of bed   room near nurse's station   safety round/check completed  Taken 4/2/2024 0000 by Diane Rivera RN  Safety Promotion/Fall Prevention:   activity supervised   nonskid shoes/slippers when out of bed   room near nurse's station   safety round/check completed  Taken 4/1/2024 2000 by Diane Rivera RN  Safety Promotion/Fall Prevention:   activity supervised   nonskid shoes/slippers when out of bed   room near nurse's station   safety round/check completed     Problem: Malnutrition  Goal: Improved Nutritional Intake  Intervention: Promote and Optimize Oral Intake  Recent Flowsheet Documentation  Taken 4/2/2024 0400 by Diane Rivera RN  Oral Nutrition Promotion:   physical activity promoted   rest periods promoted  Taken 4/2/2024 0000 by Diane Rivera RN  Oral Nutrition Promotion:   physical activity promoted   rest periods promoted  Taken 4/1/2024 2000 by Diane Rivera RN  Oral Nutrition Promotion:   physical activity promoted   rest periods promoted  Intervention: Optimize Nutrition Delivery  Recent Flowsheet Documentation  Taken 4/2/2024 0400 by Diane Rivera RN  Nutrition Support Management: weight trending reviewed  Taken 4/2/2024 0000 by Diane Rivera RN  Nutrition Support Management: weight trending reviewed  Taken 4/1/2024 2000 by Diane Rivera RN  Nutrition Support Management: weight trending reviewed     Problem: Diarrhea  Goal: Effective Diarrhea Management  Intervention: Manage Diarrhea  Recent Flowsheet  Documentation  Taken 4/2/2024 0400 by Diane Rivera RN  Fluid/Electrolyte Management: fluids provided  Isolation Precautions: enteric precautions maintained  Medication Review/Management: medications reviewed  Perineal Care: perineum cleansed  Taken 4/2/2024 0000 by Diane Rivera RN  Fluid/Electrolyte Management: fluids provided  Isolation Precautions: enteric precautions maintained  Medication Review/Management: medications reviewed  Perineal Care: perineum cleansed  Taken 4/1/2024 2200 by Diane Rivera RN  Perineal Care:   absorbent brief/pad changed   perineum cleansed   protective cream/ointment applied  Taken 4/1/2024 2000 by Diane Rivera RN  Fluid/Electrolyte Management: fluids provided  Isolation Precautions: enteric precautions maintained  Medication Review/Management: medications reviewed  Perineal Care: perineum cleansed     Problem: Pain Acute  Goal: Optimal Pain Control and Function  Intervention: Prevent or Manage Pain  Recent Flowsheet Documentation  Taken 4/2/2024 0400 by Diane Rivera RN  Sensory Stimulation Regulation:   care clustered   television on   visual stimulation provided  Medication Review/Management: medications reviewed  Taken 4/2/2024 0000 by Diane Rivera RN  Sensory Stimulation Regulation:   care clustered   television on   visual stimulation provided  Medication Review/Management: medications reviewed  Taken 4/1/2024 2000 by Diane Rivera RN  Sensory Stimulation Regulation:   care clustered   television on   visual stimulation provided  Medication Review/Management: medications reviewed  Intervention: Optimize Psychosocial Wellbeing  Recent Flowsheet Documentation  Taken 4/2/2024 0400 by Diane Rivera RN  Spiritual Activities Assistance: affirmation provided  Supportive Measures:   active listening utilized   positive reinforcement provided   relaxation techniques promoted   self-care encouraged  Taken 4/2/2024 0000 by Diane Rivera RN  Spiritual Activities  Assistance: affirmation provided  Supportive Measures:   active listening utilized   positive reinforcement provided   relaxation techniques promoted   self-care encouraged  Taken 4/1/2024 2000 by Diane Rivera RN  Spiritual Activities Assistance: affirmation provided  Supportive Measures:   active listening utilized   positive reinforcement provided   relaxation techniques promoted   self-care encouraged     Problem: Hemodialysis  Goal: Safe, Effective Therapy Delivery  Intervention: Optimize Device Care and Function  Recent Flowsheet Documentation  Taken 4/2/2024 0400 by Diane Rivera RN  Medication Review/Management: medications reviewed  Circuit Management: tubing repositioned  Taken 4/2/2024 0000 by Diane Rivera RN  Medication Review/Management: medications reviewed  Circuit Management: tubing repositioned  Taken 4/1/2024 2000 by Diane Rivera RN  Medication Review/Management: medications reviewed  Circuit Management: tubing repositioned  Goal: Effective Tissue Perfusion  Intervention: Optimize Blood Flow  Recent Flowsheet Documentation  Taken 4/2/2024 0400 by Diane Rivera RN  Stabilization Measures: legs elevated  Taken 4/2/2024 0000 by Diane Rivera RN  Stabilization Measures: legs elevated  Taken 4/1/2024 2000 by Diane Rivera RN  Stabilization Measures: legs elevated  Goal: Absence of Infection Signs and Symptoms  Intervention: Prevent or Manage Infection  Recent Flowsheet Documentation  Taken 4/2/2024 0400 by Diane Rivera RN  Infection Prevention:   hand hygiene promoted   personal protective equipment utilized   rest/sleep promoted   environmental surveillance performed   equipment surfaces disinfected   single patient room provided  Infection Management: aseptic technique maintained  Taken 4/2/2024 0000 by Diane Rivera RN  Infection Prevention:   hand hygiene promoted   personal protective equipment utilized   rest/sleep promoted   environmental surveillance performed   equipment  surfaces disinfected   single patient room provided  Infection Management: aseptic technique maintained  Taken 4/1/2024 2000 by Diane Rivera RN  Infection Prevention:   hand hygiene promoted   personal protective equipment utilized   rest/sleep promoted   environmental surveillance performed   equipment surfaces disinfected   single patient room provided  Infection Management: aseptic technique maintained     Problem: Gas Exchange Impaired  Goal: Optimal Gas Exchange  Intervention: Optimize Oxygenation and Ventilation  Recent Flowsheet Documentation  Taken 4/2/2024 0400 by Diane Rivera RN  Airway/Ventilation Management:   airway patency maintained   calming measures promoted   humidification applied   pulmonary hygiene promoted  Head of Bed (HOB) Positioning: HOB at 20-30 degrees  Taken 4/2/2024 0000 by Diane Rivera RN  Airway/Ventilation Management:   airway patency maintained   calming measures promoted   humidification applied   pulmonary hygiene promoted  Head of Bed (HOB) Positioning: HOB at 20-30 degrees  Taken 4/1/2024 2200 by Diane Rivera RN  Head of Bed (HOB) Positioning: HOB at 20-30 degrees  Taken 4/1/2024 2000 by Diane Rivera RN  Airway/Ventilation Management:   airway patency maintained   calming measures promoted   humidification applied   pulmonary hygiene promoted  Head of Bed (HOB) Positioning: HOB at 20-30 degrees     Problem: Hemodialysis  Goal: Safe, Effective Therapy Delivery  Intervention: Optimize Device Care and Function  Recent Flowsheet Documentation  Taken 4/2/2024 0400 by Diane Rivera RN  Medication Review/Management: medications reviewed  Circuit Management: tubing repositioned  Taken 4/2/2024 0000 by Diane Rivera RN  Medication Review/Management: medications reviewed  Circuit Management: tubing repositioned  Taken 4/1/2024 2000 by Diane Rivera RN  Medication Review/Management: medications reviewed  Circuit Management: tubing repositioned  Goal: Effective Tissue  Perfusion  Intervention: Optimize Blood Flow  Recent Flowsheet Documentation  Taken 4/2/2024 0400 by Diane Rivera RN  Stabilization Measures: legs elevated  Taken 4/2/2024 0000 by Diane Rivera RN  Stabilization Measures: legs elevated  Taken 4/1/2024 2000 by Diane Rivera RN  Stabilization Measures: legs elevated  Goal: Absence of Infection Signs and Symptoms  Intervention: Prevent or Manage Infection  Recent Flowsheet Documentation  Taken 4/2/2024 0400 by Diane Rivera RN  Infection Prevention:   hand hygiene promoted   personal protective equipment utilized   rest/sleep promoted   environmental surveillance performed   equipment surfaces disinfected   single patient room provided  Infection Management: aseptic technique maintained  Taken 4/2/2024 0000 by Diane Rivera RN  Infection Prevention:   hand hygiene promoted   personal protective equipment utilized   rest/sleep promoted   environmental surveillance performed   equipment surfaces disinfected   single patient room provided  Infection Management: aseptic technique maintained  Taken 4/1/2024 2000 by Diane Rivera RN  Infection Prevention:   hand hygiene promoted   personal protective equipment utilized   rest/sleep promoted   environmental surveillance performed   equipment surfaces disinfected   single patient room provided  Infection Management: aseptic technique maintained     Problem: Adult Inpatient Plan of Care  Goal: Plan of Care Review  Description: The Plan of Care Review/Shift note should be completed every shift.  The Outcome Evaluation is a brief statement about your assessment that the patient is improving, declining, or no change.  This information will be displayed automatically on your shift  note.  Recent Flowsheet Documentation  Taken 4/2/2024 0516 by Diane Rivera RN  Outcome Evaluation: VSS, afebrile, P/S all day and night  Plan of Care Reviewed With: patient  Overall Patient Progress: improving  Goal: Absence of  Hospital-Acquired Illness or Injury  Intervention: Identify and Manage Fall Risk  Recent Flowsheet Documentation  Taken 4/2/2024 0400 by Diane Rivera RN  Safety Promotion/Fall Prevention:   activity supervised   nonskid shoes/slippers when out of bed   room near nurse's station   safety round/check completed  Taken 4/2/2024 0000 by Diane Rivera RN  Safety Promotion/Fall Prevention:   activity supervised   nonskid shoes/slippers when out of bed   room near nurse's station   safety round/check completed  Taken 4/1/2024 2000 by Diane Rivera RN  Safety Promotion/Fall Prevention:   activity supervised   nonskid shoes/slippers when out of bed   room near nurse's station   safety round/check completed  Intervention: Prevent Skin Injury  Recent Flowsheet Documentation  Taken 4/2/2024 0400 by Diane Rivera RN  Body Position: position changed independently  Skin Protection:   adhesive use limited   skin to skin areas padded   skin to device areas padded   transparent dressing maintained  Device Skin Pressure Protection:   absorbent pad utilized/changed   positioning supports utilized   pressure points protected   skin-to-device areas padded   skin-to-skin areas padded   tubing/devices free from skin contact  Taken 4/2/2024 0000 by Diane Rivera RN  Body Position: position changed independently  Skin Protection:   adhesive use limited   skin to skin areas padded   skin to device areas padded   transparent dressing maintained  Device Skin Pressure Protection:   absorbent pad utilized/changed   positioning supports utilized   pressure points protected   skin-to-device areas padded   skin-to-skin areas padded   tubing/devices free from skin contact  Taken 4/1/2024 2200 by Diane Rivera RN  Body Position: position changed independently  Taken 4/1/2024 2000 by Diane Rivera RN  Body Position: position changed independently  Skin Protection:   adhesive use limited   skin to skin areas padded   skin to device areas  padded   transparent dressing maintained  Device Skin Pressure Protection:   absorbent pad utilized/changed   positioning supports utilized   pressure points protected   skin-to-device areas padded   skin-to-skin areas padded   tubing/devices free from skin contact  Intervention: Prevent and Manage VTE (Venous Thromboembolism) Risk  Recent Flowsheet Documentation  Taken 4/2/2024 0400 by Diane Rivera RN  VTE Prevention/Management: SCDs (sequential compression devices) on  Taken 4/2/2024 0000 by Diane Rivera RN  VTE Prevention/Management: SCDs (sequential compression devices) on  Taken 4/1/2024 2000 by Diane Rivera RN  VTE Prevention/Management: SCDs (sequential compression devices) on  Intervention: Prevent Infection  Recent Flowsheet Documentation  Taken 4/2/2024 0400 by Diane Rivera RN  Infection Prevention:   hand hygiene promoted   personal protective equipment utilized   rest/sleep promoted   environmental surveillance performed   equipment surfaces disinfected   single patient room provided  Taken 4/2/2024 0000 by Diane Rivera RN  Infection Prevention:   hand hygiene promoted   personal protective equipment utilized   rest/sleep promoted   environmental surveillance performed   equipment surfaces disinfected   single patient room provided  Taken 4/1/2024 2000 by Diane Rivera RN  Infection Prevention:   hand hygiene promoted   personal protective equipment utilized   rest/sleep promoted   environmental surveillance performed   equipment surfaces disinfected   single patient room provided  Goal: Optimal Comfort and Wellbeing  Intervention: Provide Person-Centered Care  Recent Flowsheet Documentation  Taken 4/2/2024 0400 by Diane Rivera RN  Trust Relationship/Rapport:   care explained   choices provided   emotional support provided   questions answered   thoughts/feelings acknowledged  Taken 4/2/2024 0000 by Diane Rivera RN  Trust Relationship/Rapport:   care explained   choices provided    emotional support provided   questions answered   thoughts/feelings acknowledged  Taken 4/1/2024 2000 by Diane Rivera RN  Trust Relationship/Rapport:   care explained   choices provided   emotional support provided   questions answered   thoughts/feelings acknowledged     Problem: Mechanical Ventilation Invasive  Goal: Effective Communication  Intervention: Ensure Effective Communication  Recent Flowsheet Documentation  Taken 4/2/2024 0400 by Diane Rivera RN  Communication Enhancement Strategies:   call light answered in person   communication board used   nonverbal strategies used  Trust Relationship/Rapport:   care explained   choices provided   emotional support provided   questions answered   thoughts/feelings acknowledged  Taken 4/2/2024 0000 by Diane Rivera RN  Communication Enhancement Strategies:   call light answered in person   communication board used   nonverbal strategies used  Trust Relationship/Rapport:   care explained   choices provided   emotional support provided   questions answered   thoughts/feelings acknowledged  Taken 4/1/2024 2000 by Diane Rivera RN  Communication Enhancement Strategies:   call light answered in person   communication board used   nonverbal strategies used  Trust Relationship/Rapport:   care explained   choices provided   emotional support provided   questions answered   thoughts/feelings acknowledged  Goal: Optimal Device Function  Intervention: Optimize Device Care and Function  Recent Flowsheet Documentation  Taken 4/2/2024 0400 by Diane Rivera RN  Airway Safety Measures: all equipment/monitors on and audible  Airway/Ventilation Management:   airway patency maintained   calming measures promoted   humidification applied   pulmonary hygiene promoted  Oral Care: swabbed with antiseptic solution  Taken 4/2/2024 0000 by Diane Rivera RN  Airway Safety Measures: all equipment/monitors on and audible  Airway/Ventilation Management:   airway patency maintained    calming measures promoted   humidification applied   pulmonary hygiene promoted  Oral Care: swabbed with antiseptic solution  Taken 4/1/2024 2000 by Diane Rivera RN  Airway Safety Measures: all equipment/monitors on and audible  Airway/Ventilation Management:   airway patency maintained   calming measures promoted   humidification applied   pulmonary hygiene promoted  Oral Care: swabbed with antiseptic solution  Goal: Mechanical Ventilation Liberation  Intervention: Promote Extubation and Mechanical Ventilation Liberation  Recent Flowsheet Documentation  Taken 4/2/2024 0400 by Diane Rivera RN  Medication Review/Management: medications reviewed  Environmental Support:   calm environment promoted   personal routine supported  Taken 4/2/2024 0000 by Diane Rivera RN  Medication Review/Management: medications reviewed  Environmental Support:   calm environment promoted   personal routine supported  Taken 4/1/2024 2000 by Diane Rivera RN  Medication Review/Management: medications reviewed  Environmental Support:   calm environment promoted   personal routine supported  Goal: Optimal Nutrition Delivery  Intervention: Optimize Nutrition Delivery  Recent Flowsheet Documentation  Taken 4/2/2024 0400 by Diane Rivera RN  Nutrition Support Management: weight trending reviewed  Taken 4/2/2024 0000 by Diane Rivera RN  Nutrition Support Management: weight trending reviewed  Taken 4/1/2024 2000 by Diane Rivera RN  Nutrition Support Management: weight trending reviewed  Goal: Absence of Device-Related Skin and Tissue Injury  Intervention: Maintain Skin and Tissue Health  Recent Flowsheet Documentation  Taken 4/2/2024 0400 by Diane Rivera RN  Device Skin Pressure Protection:   absorbent pad utilized/changed   positioning supports utilized   pressure points protected   skin-to-device areas padded   skin-to-skin areas padded   tubing/devices free from skin contact  Taken 4/2/2024 0000 by Diane Rivera  RN  Device Skin Pressure Protection:   absorbent pad utilized/changed   positioning supports utilized   pressure points protected   skin-to-device areas padded   skin-to-skin areas padded   tubing/devices free from skin contact  Taken 4/1/2024 2000 by Diane Rivera RN  Device Skin Pressure Protection:   absorbent pad utilized/changed   positioning supports utilized   pressure points protected   skin-to-device areas padded   skin-to-skin areas padded   tubing/devices free from skin contact  Goal: Absence of Ventilator-Induced Lung Injury  Intervention: Prevent Ventilator-Associated Pneumonia  Recent Flowsheet Documentation  Taken 4/2/2024 0400 by Diane Rivera RN  VAP Prevention Bundle:   HOB elevation maintained   oral care regularly provided   readiness to extubate assessed   stress ulcer prophylaxis provided   VTE prophylaxis provided  Oral Care: swabbed with antiseptic solution  Head of Bed (HOB) Positioning: HOB at 20-30 degrees  VAP Prevention Contraindications: condition unstable  VAP Prevention Measures: completed  Taken 4/2/2024 0000 by Diane Rivera RN  VAP Prevention Bundle:   HOB elevation maintained   oral care regularly provided   readiness to extubate assessed   stress ulcer prophylaxis provided   VTE prophylaxis provided  Oral Care: swabbed with antiseptic solution  Head of Bed (HOB) Positioning: HOB at 20-30 degrees  VAP Prevention Contraindications: condition unstable  VAP Prevention Measures: completed  Taken 4/1/2024 2200 by Diane Rivera RN  Head of Bed (HOB) Positioning: HOB at 20-30 degrees  Taken 4/1/2024 2000 by Diane Rivera RN  VAP Prevention Bundle:   HOB elevation maintained   oral care regularly provided   readiness to extubate assessed   stress ulcer prophylaxis provided   VTE prophylaxis provided  Oral Care: swabbed with antiseptic solution  Head of Bed (HOB) Positioning: HOB at 20-30 degrees  VAP Prevention Contraindications: condition unstable  VAP Prevention Measures:  completed     Problem: Mechanical Ventilation Invasive  Goal: Effective Communication  Intervention: Ensure Effective Communication  Recent Flowsheet Documentation  Taken 4/2/2024 0400 by Diane Rivera RN  Communication Enhancement Strategies:   call light answered in person   communication board used   nonverbal strategies used  Trust Relationship/Rapport:   care explained   choices provided   emotional support provided   questions answered   thoughts/feelings acknowledged  Taken 4/2/2024 0000 by Diane Rivera RN  Communication Enhancement Strategies:   call light answered in person   communication board used   nonverbal strategies used  Trust Relationship/Rapport:   care explained   choices provided   emotional support provided   questions answered   thoughts/feelings acknowledged  Taken 4/1/2024 2000 by Diane Rivera RN  Communication Enhancement Strategies:   call light answered in person   communication board used   nonverbal strategies used  Trust Relationship/Rapport:   care explained   choices provided   emotional support provided   questions answered   thoughts/feelings acknowledged  Goal: Optimal Device Function  Intervention: Optimize Device Care and Function  Recent Flowsheet Documentation  Taken 4/2/2024 0400 by Diane Rivera RN  Airway Safety Measures: all equipment/monitors on and audible  Airway/Ventilation Management:   airway patency maintained   calming measures promoted   humidification applied   pulmonary hygiene promoted  Oral Care: swabbed with antiseptic solution  Taken 4/2/2024 0000 by Diane Rivera RN  Airway Safety Measures: all equipment/monitors on and audible  Airway/Ventilation Management:   airway patency maintained   calming measures promoted   humidification applied   pulmonary hygiene promoted  Oral Care: swabbed with antiseptic solution  Taken 4/1/2024 2000 by Diane Rivera RN  Airway Safety Measures: all equipment/monitors on and audible  Airway/Ventilation  Management:   airway patency maintained   calming measures promoted   humidification applied   pulmonary hygiene promoted  Oral Care: swabbed with antiseptic solution  Goal: Mechanical Ventilation Liberation  Intervention: Promote Extubation and Mechanical Ventilation Liberation  Recent Flowsheet Documentation  Taken 4/2/2024 0400 by Diane Rivera RN  Medication Review/Management: medications reviewed  Environmental Support:   calm environment promoted   personal routine supported  Taken 4/2/2024 0000 by Diane Rivera RN  Medication Review/Management: medications reviewed  Environmental Support:   calm environment promoted   personal routine supported  Taken 4/1/2024 2000 by Diane Rivera RN  Medication Review/Management: medications reviewed  Environmental Support:   calm environment promoted   personal routine supported  Goal: Optimal Nutrition Delivery  Intervention: Optimize Nutrition Delivery  Recent Flowsheet Documentation  Taken 4/2/2024 0400 by Diane Rivera RN  Nutrition Support Management: weight trending reviewed  Taken 4/2/2024 0000 by Diane Rivera RN  Nutrition Support Management: weight trending reviewed  Taken 4/1/2024 2000 by Diane Rivera RN  Nutrition Support Management: weight trending reviewed  Goal: Absence of Device-Related Skin and Tissue Injury  Intervention: Maintain Skin and Tissue Health  Recent Flowsheet Documentation  Taken 4/2/2024 0400 by Diane Rivera RN  Device Skin Pressure Protection:   absorbent pad utilized/changed   positioning supports utilized   pressure points protected   skin-to-device areas padded   skin-to-skin areas padded   tubing/devices free from skin contact  Taken 4/2/2024 0000 by Diane Rivera RN  Device Skin Pressure Protection:   absorbent pad utilized/changed   positioning supports utilized   pressure points protected   skin-to-device areas padded   skin-to-skin areas padded   tubing/devices free from skin contact  Taken 4/1/2024 2000 by Nicole  RIYA Contreras  Device Skin Pressure Protection:   absorbent pad utilized/changed   positioning supports utilized   pressure points protected   skin-to-device areas padded   skin-to-skin areas padded   tubing/devices free from skin contact  Goal: Absence of Ventilator-Induced Lung Injury  Intervention: Prevent Ventilator-Associated Pneumonia  Recent Flowsheet Documentation  Taken 4/2/2024 0400 by Diane Rivera RN  VAP Prevention Bundle:   HOB elevation maintained   oral care regularly provided   readiness to extubate assessed   stress ulcer prophylaxis provided   VTE prophylaxis provided  Oral Care: swabbed with antiseptic solution  Head of Bed (HOB) Positioning: HOB at 20-30 degrees  VAP Prevention Contraindications: condition unstable  VAP Prevention Measures: completed  Taken 4/2/2024 0000 by Diane Rivera RN  VAP Prevention Bundle:   HOB elevation maintained   oral care regularly provided   readiness to extubate assessed   stress ulcer prophylaxis provided   VTE prophylaxis provided  Oral Care: swabbed with antiseptic solution  Head of Bed (HOB) Positioning: HOB at 20-30 degrees  VAP Prevention Contraindications: condition unstable  VAP Prevention Measures: completed  Taken 4/1/2024 2200 by Diane Rivera RN  Head of Bed (HOB) Positioning: HOB at 20-30 degrees  Taken 4/1/2024 2000 by Diane Rivera RN  VAP Prevention Bundle:   HOB elevation maintained   oral care regularly provided   readiness to extubate assessed   stress ulcer prophylaxis provided   VTE prophylaxis provided  Oral Care: swabbed with antiseptic solution  Head of Bed (HOB) Positioning: HOB at 20-30 degrees  VAP Prevention Contraindications: condition unstable  VAP Prevention Measures: completed     Problem: Gas Exchange Impaired  Goal: Optimal Gas Exchange  Intervention: Optimize Oxygenation and Ventilation  Recent Flowsheet Documentation  Taken 4/2/2024 0400 by Diane Rivera RN  Airway/Ventilation Management:   airway patency maintained    calming measures promoted   humidification applied   pulmonary hygiene promoted  Head of Bed (HOB) Positioning: HOB at 20-30 degrees  Taken 4/2/2024 0000 by Diane Rivera RN  Airway/Ventilation Management:   airway patency maintained   calming measures promoted   humidification applied   pulmonary hygiene promoted  Head of Bed (HOB) Positioning: HOB at 20-30 degrees  Taken 4/1/2024 2200 by Diane Rivera RN  Head of Bed (HOB) Positioning: HOB at 20-30 degrees  Taken 4/1/2024 2000 by Diane Rivera RN  Airway/Ventilation Management:   airway patency maintained   calming measures promoted   humidification applied   pulmonary hygiene promoted  Head of Bed (HOB) Positioning: HOB at 20-30 degrees     Problem: Malnutrition  Goal: Improved Nutritional Intake  Intervention: Promote and Optimize Oral Intake  Recent Flowsheet Documentation  Taken 4/2/2024 0400 by Diane Rivera RN  Oral Nutrition Promotion:   physical activity promoted   rest periods promoted  Taken 4/2/2024 0000 by Diane Rivera RN  Oral Nutrition Promotion:   physical activity promoted   rest periods promoted  Taken 4/1/2024 2000 by Diane Rivera RN  Oral Nutrition Promotion:   physical activity promoted   rest periods promoted  Intervention: Optimize Nutrition Delivery  Recent Flowsheet Documentation  Taken 4/2/2024 0400 by Diane Rivera RN  Nutrition Support Management: weight trending reviewed  Taken 4/2/2024 0000 by Diane Rivera RN  Nutrition Support Management: weight trending reviewed  Taken 4/1/2024 2000 by Diane Rivera RN  Nutrition Support Management: weight trending reviewed

## 2024-04-02 NOTE — PROGRESS NOTES
Pulmonary Medicine  Cystic Fibrosis - Lung Transplant Team  Progress Note  2024     Patient: Sofie Rodriguez  MRN: [id number]  : 1960 (age 63 year old)  Transplant: 2022 (Lung), POD#644  Admission date: 2/10/2024    Assessment & Plan:     Sofie Rodriguez is a 63 year old female with h/o COPD s/p BSLT () with course complicated by post-operative hemidiaphragm palsy, recurrent PNAs, positive DSA, EBV viremia, hypogammaglobulinemia, severe gastroparesis s/p G/J tube placement (22) and pyloric botox (23), GI bleed 2/2 pyloric ulcer, hemobilia s/p ERCP and MRCP, chronic diarrhea, recurrent C diff colitis, ESRD on iHD, recurrent falls with injuries (recent right hip fx s/p ORIF 2023), deconditioning, and FTT.  Admitted 2/10 for failure to thrive and initiation of TPN/lipids.  Emergent intubation - for hypoxic and hypercapneic respiratory failure and encephalopathy. Returned to ICU - and again 3/18-3/20 d/t tenuous respiratory status complicated by variable daytime NIPPV tolerance and hypervolemia with iHD limited d/t hypotension. Again transferred back to ICU 3/24 for intubation and bronch/BAL given hypoxic and hypercapneic respiratory failure. CT with extensive bilateral infiltrate and etiology likely multifactorial including volume overload and infection, possible mucous plugging, ACR or AMR less likely.  Tolerating continuous PS after recurrent period of aggressive volume removal with dialysis.       Today's recommendations:  - Following cultures  - Zosyn through today for 10 day empiric course  - Pressure support trials and potential extubation today per MICU  - Will need to pursue tracheostomy placement if pt. fails extubation (pt. agreeable) but must acknowledge that this will likely eliminate most if not all disposition options given concurrent need for iHD  - Strict adherence to NIPPV overnight (BiPAP vs AVAPS) and with all naps after extubation, should obtain  home NIPPV machine and work to transition to long-term device while inpatient  - ImmuKnow 4/1 pending  - CSA level ordered 4/3  - DSA and EBV due 4/23     Acute on chronic hypoxic/hypercapneic respiratory failure:  S/p bilateral sequential lung transplant for COPD:   Hypoventilation, Suspected CARLEE:  PFTs in clinic remained significantly below her baseline.  Baseline hypoxia with 2L NC overnight PTA.  S/p intubation 2/17-2/19 for acute hypoxic/hypercapneic respiratory failure with encephalopathy, CT with concern for infection and pulmonary edema given recent addition of TPN nutrition.  Bronch (2/18) with very friable tissue, cutlures only with C. glabrata.  Persistent respiratory failure also complicated by hypoventilation and deconditioning d/t malnutrition. Neurology consulted 2/27, MRI brain (3/1) without acute intracranial pathology.  SNIFF (3/8) normal.  Metabolic CART suggested overfeeding on TPN.  Returned to ICU again 3/18-3/20 d/t tenuous respiratory status complicated by NIPPV intolerance and hypervolemia with iHD limited d/t hypotension (CXR with increased pulmonary edema).  Overall, her PCO2 retention is likely multifactorial with a component being from overfeeding (though remedied with nutrition adjustment), metabolic compensation barrier d/t renal failure, pulmonary edema, bicarb loss with diarrhea, hypoventilation with declining NIF and FVC concerning for neuromuscular etiology (though Neurology consult was not revealing), and NIPPV intolerance due to claustrophobia. Worsening hypoxic and hypercapneic respiratory failure 3/24 and transferred to ICU for intubation. CT chest with extensive bilateral infiltrates markedly increased from previous. Bronch with small L>R sided secretions easily suctioned, BAL with no evidence of DAH, non bloody. Etiology likely multifactorial including volume overload and infection, ACR or AMR less likely (as below).  Hypoxia improving, hypercapnia stable with PS.  - Blood  culture (3/24) NGTD  - BAL (3/24) NGTD  - ABX: Zosyn (3/23-4/2) for 10 day empiric course  - Aggressive airway clearance with Volera (intrapulmonary percussion) QID and nebs: Xopenex and Mucomyst QID  - Ventilator management and pressure support per MICU, daily VBG with goal for permissive hypercapnea to mid 60's, will need to pursue tracheostomy placement if pt. fails extubation (pt. agreeable) but must acknowledge that this will likely eliminate most if not all disposition options given concurrent need for iHD  - Strict adherence to NIPPV overnight and with all naps after extubation, should obtain home NIPPV machine and work to transition to long-term device while inpatient     Immunosuppression:  AZA previously stopped 5/2023 d/t low ImmuKnow assay and EBV viremia.  ImmuKnow (2/27) remained low at 88.  Transitioned from [place] to CSA 3/11.  - ImmuKnow 4/1 pending  -  BID (dose held 3/30 PM and decreased 3/31).  Goal 140-170.  Repeat level 4/3 (ordered).  - Prednisone 5 mg qAM / 2.5 mg qPM     Prophylaxis:   - Bactrim qMWF for PJP ppx (3/13, prior dapsone through 3/11)  - CMV ppx not currently indicated, D+/R+, CMV negative 3/18     Positive DSA: AMR treatment deferred given frailty and stable PFTs.  DSA DQB2 decreased on 3/6 with 5667 mfi, and again decreased to 4960 on 3/23.  Most recent cell-free DNA (2/18) mildly elevated at 1.04 (concerning for possible rejection), which was increased from prior level of 0.12 (1/10).  IST increase deferred at that time per Dr. Gong.  S/p IVIG (2/27) for DSA (IgG WNL).  Immuknow as above.  Prospera (cell free DNA) 0.44 (3/18) suggests AMR less likely, follow  - Repeat DSA monthly (due 4/23)      EBV viremia: CT CAP (2/7) without lymphadenopathy.  Recent levels improving (3/18) 23k.  - Repeat EBV due 4/23     Other relevant problems being managed by the primary team:      FTT:  Severe protein calorie malnutrition:  Gastroparesis s/p PEG/J, botox, and G-POEM:  SB  hypomotility:  Pyloric ulcer:  Chronic nausea and osmotic diarrhea:  SIBO s/p rifaximin:   Recurrent C diff colitis: Chronic osmotic and infectious diarrhea since transplant with recurrent episodes of C diff.  Notable weight loss (40# in a year) d/t diarrhea, GI dysmotility, and intolerance of enteral feeds (PEG/J tube in place), most recently on elemental formula.  Extensive OP eval and f/u with GI. S/p port placement for TPN and lipids. Continued for ~5 weeks inpatient without considerable improvement, transitioned back to TF.  C diff positive (3/13), on fidaxomicin.     ESRD: CT with volume overload secondary to TPN volume, iHD increased from PTA TThSa schedule with unsustained improvement.  Management per Nephrology, dialysis via [facility] CVC.  Unable to remove fluid on 3/23 d/t hypotension, tolerance improved with midodrine.  - Volume removal and dialysis per nephrology     Goals of care: Care conference held with palliative and primary team on 3/15 for medical update with pt. and daughters.  Repeat care conference 3/29 (see palliative and MICU notes) patient would like to proceed with: plan for extubation when able, hope to tolerate some bipap, if doesn't go well then reintubate with trach OR pursue comfort measures. Pt and her family are still thinking about overall what they would do if bipap were not to work or she wouldn't tolerate it.       We appreciate the excellent care provided by the MICU team.  Recommendations communicated via this note.  Will continue to follow along closely, please do not hesitate to call with any questions or concerns.     Patient discussed with Dr. Castillo.    Sara Grayson, APRN, CNP   Inpatient Nurse Practitioner  Pulmonary CF/Transplant       Subjective & Interval History:     Sitting up in chair on PS 5/5 30% with -300 ml. On PS 10/5 overnight 30%. Breathing remains comfortable and has tolerated 2 days without HD.  Cough and minimal secretions unchanged. TF remain  at goal, stools loose and gradually improving.     Review of Systems:     C: No fever, no chills  INTEGUMENTARY/SKIN: No rash or obvious new lesions  ENT/MOUTH: No sore throat, no sinus pain, no nasal congestion or drainage  RESP: See interval history  CV: No chest pain, no palpitations, no peripheral edema  GI: No nausea, no vomiting, no reflux symptoms  : No dysuria  MUSCULOSKELETAL: No myalgias, no arthralgias  ENDOCRINE: Blood sugars with adequate control  NEURO: No headache, no numbness or tingling  PSYCHIATRIC: Mood stable    Physical Exam:     All notes, images, and labs from past 24 hours (at minimum) were reviewed.    Vital signs:  Temp: 97.8  F (36.6  C) Temp src: Axillary BP: 133/69 Pulse: 73   Resp: 19 SpO2: 100 % O2 Device: Mechanical Ventilator     Weight: 48.1 kg (106 lb 0.7 oz)  I/O:   Intake/Output Summary (Last 24 hours) at 4/2/2024 1105  Last data filed at 4/2/2024 0800  Gross per 24 hour   Intake 1355 ml   Output --   Net 1355 ml     Constitutional: sitting up in chair, in no apparent distress.   HEENT: Eyes with pink conjunctivae, anicteric. Orally intubated.   PULM: Good air flow bilaterally.  No crackles, no rhonchi, no wheezes.  Non-labored breathing on PS  CV: Normal S1 and S2.  RRR.  No murmur, gallop, or rub.  No peripheral edema.   ABD: NABS, soft, nontender, nondistended.  PEG/J tube site not visualized.   MSK: Moves all extremities.  + muscle wasting.   NEURO: Alert and conversant by mouthing words and writing  SKIN: Warm, dry, fragile, scattered ecchymosis.   PSYCH: Mood stable, calm    Data:     LABS    CMP:   Recent Labs   Lab 04/02/24  0306 04/01/24  0750 04/01/24  0515 03/31/24  0456 03/30/24  0825 03/30/24  0336     --  138 140  --  138   POTASSIUM 3.6  --  3.5 3.5  --  3.8   CHLORIDE 94*  --  96* 99  --  98   CO2 26  --  27 29  --  26   ANIONGAP 17*  --  15 12  --  14   * 89 126* 130*   < > 113*   BUN 62.5*  --  47.7* 27.4*  --  48.7*   CR 4.38*  --  3.47* 2.20*   "--  3.25*   GFRESTIMATED 11*  --  14* 25*  --  16*   ESTUARDO 8.8  --  9.0 8.8  --  9.3   MAG 2.4*  --  2.4* 1.6*  --  1.9   PHOS 6.8*  --  6.1* 4.4  --  5.9*   PROTTOTAL 5.4*  --  5.4* 5.6*  --  5.7*   ALBUMIN 3.4*  --  3.5 3.4*  --  3.5   BILITOTAL 0.2  --  0.2 0.3  --  0.3   ALKPHOS 80  --  80 81  --  92   AST 13  --  14 17  --  18   ALT 10  --  14 13  --  14    < > = values in this interval not displayed.     CBC:   Recent Labs   Lab 04/02/24 0306 04/01/24 0515 03/31/24 0456 03/30/24  0336   WBC 5.1 4.1 4.7 5.2   RBC 2.60* 2.56* 2.56* 2.51*   HGB 8.8* 8.7* 8.8* 8.7*   HCT 28.5* 28.9* 28.9* 28.6*   * 113* 113* 114*   MCH 33.8* 34.0* 34.4* 34.7*   MCHC 30.9* 30.1* 30.4* 30.4*   RDW 16.1* 16.4* 16.5* 16.9*    211 204 180       INR:   Recent Labs   Lab 04/02/24 0306 04/01/24 0515 03/31/24  0456 03/30/24  0336   INR 1.35* 1.29* 1.27* 1.24*       Glucose:   Recent Labs   Lab 04/02/24 0306 04/01/24  0750 04/01/24  0515 03/31/24  0456 03/30/24  0825 03/30/24  0336   * 89 126* 130* 101* 113*       Blood Gas:   Recent Labs   Lab 04/02/24 0306 04/01/24 0515 03/31/24  0456   PHV 7.30* 7.32 7.34   PCO2V 62* 61* 60*   PO2V 45 49* 44   HCO3V 31* 31* 32*   LINDY 3.0 4.0* 5.4*   O2PER 30 30 30       Culture Data No results for input(s): \"CULT\" in the last 168 hours.    Virology Data:   Lab Results   Component Value Date    FLUAH1 Not Detected 03/24/2024    FLUAH3 Not Detected 03/24/2024    HM3167 Not Detected 03/24/2024    IFLUB Not Detected 03/24/2024    RSVA Not Detected 03/24/2024    RSVB Not Detected 03/24/2024    PIV1 Not Detected 03/24/2024    PIV2 Not Detected 03/24/2024    PIV3 Not Detected 03/24/2024    HMPV Not Detected 03/24/2024       Historical CMV results (last 3 of prior testing):  Lab Results   Component Value Date    CMVQNT Not Detected 03/18/2024    CMVQNT Not Detected 02/19/2024    CMVQNT Not Detected 02/07/2024     Lab Results   Component Value Date    CMVLOG 3.2 07/12/2023    CMVLOG " <2.1 04/19/2023    CMVLOG 3.5 01/25/2023       Urine Studies    Recent Labs   Lab Test 02/18/24  0222 05/18/23  0627   URINEPH 7.5* 5.0   NITRITE Negative Negative   LEUKEST Trace* Moderate*   WBCU 66* 21*       Most Recent B[place]ze Pulmonary Function Testing (FVC/FEV1 only)  FVC-Pre   Date Value Ref Range Status   02/07/2024 1.19 L    01/10/2024 1.12 L    08/29/2023 1.48 L    07/25/2023 1.55 L      FVC-%Pred-Pre   Date Value Ref Range Status   02/07/2024 42 %    01/10/2024 39 %    08/29/2023 53 %    07/25/2023 55 %      FEV1-Pre   Date Value Ref Range Status   02/07/2024 1.13 L    01/10/2024 1.10 L    08/29/2023 1.43 L    07/25/2023 1.54 L      FEV1-%Pred-Pre   Date Value Ref Range Status   02/07/2024 51 %    01/10/2024 49 %    08/29/2023 64 %    07/25/2023 69 %        IMAGING    Recent Results (from the past 48 hour(s))   XR Chest Port 1 View    Narrative    Exam: XR CHEST PORT 1 VIEW, 4/1/2024 9:07 AM    Indication: Interval f/u post aggressive iHD/UF    Comparison: 3/29/2024    Findings:   Clamshell sternotomy and bilateral lung transplants. ET tube tip now  projects over the midthoracic trachea, repositioned from previous.  Right PICC tip near the superior cavoatrial junction.     Cardiomegaly with similar appearance of left greater than right  pleural effusions. Unchanged abnormal position of the left oblique  fissure. No pneumothorax or new consolidation.      Impression    Impression: Bilateral lung transplantation   1. Similar left greater than right pleural effusions.  2. Repositioned ET tube now projecting over the midthoracic trachea.    I have personally reviewed the examination and initial interpretation  and I agree with the findings.    ALVINA HARRY MD         SYSTEM ID:  [id number]

## 2024-04-02 NOTE — PROGRESS NOTES
Date: 4/2/2024  Time: 1130     Data:    Ultrafiltration - Post Run Net Total Removed (mL):  3000 ml  Vascular Access Status: Fistula patent  Dialyzer Rinse:  Light  Total Blood Volume Processed: 91 L   Total Dialysis (Treatment) Time:   3.5 Hrs  Dialysate Bath: K 3, Ca 2.25  Heparin: Heparin: None     Lab:   No  HbsAg - non reactive (03/18/2024)  HbsAb - immune >69.7 (03/18/2024)      Interventions:  Dialysis done through left upper arm AV Fistula using 15 gauge needles  UF set to 3.3 Liters, accommodating priming and rinse back volumes  ,   Venofer 50 mg and epoetin 8000 unit(s)  administered per MAR  CritLine used for fluid volume monitoring, Profile A/B throughout the run, tolerating UF pull, stable blood pressures throughout, received Midodrine 10 mg pre-dialysis, Vital signs monitored every 15 min and PRN,   Pt remained hemodynamically stable throughout hemodialysis  Treatment ended as expected and rinsed back successfully  Decannulation done post HD, hemostasis is achieved in 10 minutes  Pressure dressing is applied, to be removed after 4-6 hours  Pt remains in room in stable condition     Assessment:  A/O x 4, is intubated, calm and cooperative, denies pain  Left upper arm AV Fistula has good thrill and bruit                Plan:    Per Renal team        MISAEL GarayN, RN  Acute Dialysis RN  [facility]

## 2024-04-03 ENCOUNTER — APPOINTMENT (OUTPATIENT)
Dept: OCCUPATIONAL THERAPY | Facility: CLINIC | Age: 64
DRG: 640 | End: 2024-04-03
Attending: INTERNAL MEDICINE
Payer: MEDICARE

## 2024-04-03 ENCOUNTER — APPOINTMENT (OUTPATIENT)
Dept: PHYSICAL THERAPY | Facility: CLINIC | Age: 64
DRG: 640 | End: 2024-04-03
Attending: INTERNAL MEDICINE
Payer: MEDICARE

## 2024-04-03 ENCOUNTER — APPOINTMENT (OUTPATIENT)
Dept: SPEECH THERAPY | Facility: CLINIC | Age: 64
DRG: 640 | End: 2024-04-03
Payer: MEDICARE

## 2024-04-03 LAB
ALBUMIN SERPL BCG-MCNC: 3.6 G/DL (ref 3.5–5.2)
ALP SERPL-CCNC: 88 U/L (ref 40–150)
ALT SERPL W P-5'-P-CCNC: 13 U/L (ref 0–50)
ANION GAP SERPL CALCULATED.3IONS-SCNC: 11 MMOL/L (ref 7–15)
AST SERPL W P-5'-P-CCNC: 15 U/L (ref 0–45)
BASE EXCESS BLDV CALC-SCNC: 4.3 MMOL/L (ref -3–3)
BASE EXCESS BLDV CALC-SCNC: 5.1 MMOL/L (ref -3–3)
BASOPHILS # BLD AUTO: ABNORMAL 10*3/UL
BASOPHILS # BLD MANUAL: 0.1 10E3/UL (ref 0–0.2)
BASOPHILS NFR BLD AUTO: ABNORMAL %
BASOPHILS NFR BLD MANUAL: 1 %
BILIRUB SERPL-MCNC: 0.2 MG/DL
BUN SERPL-MCNC: 29.6 MG/DL (ref 8–23)
CALCIUM SERPL-MCNC: 8.9 MG/DL (ref 8.8–10.2)
CHLORIDE SERPL-SCNC: 96 MMOL/L (ref 98–107)
CREAT SERPL-MCNC: 2.56 MG/DL (ref 0.51–0.95)
CYCLOSPORINE BLD LC/MS/MS-MCNC: 176 UG/L (ref 50–400)
DEPRECATED HCO3 PLAS-SCNC: 29 MMOL/L (ref 22–29)
EGFRCR SERPLBLD CKD-EPI 2021: 21 ML/MIN/1.73M2
EOSINOPHIL # BLD AUTO: ABNORMAL 10*3/UL
EOSINOPHIL # BLD MANUAL: 0.3 10E3/UL (ref 0–0.7)
EOSINOPHIL NFR BLD AUTO: ABNORMAL %
EOSINOPHIL NFR BLD MANUAL: 5 %
ERYTHROCYTE [DISTWIDTH] IN BLOOD BY AUTOMATED COUNT: 16.3 % (ref 10–15)
GLUCOSE SERPL-MCNC: 119 MG/DL (ref 70–99)
HCO3 BLDV-SCNC: 32 MMOL/L (ref 21–28)
HCO3 BLDV-SCNC: 33 MMOL/L (ref 21–28)
HCT VFR BLD AUTO: 30.3 % (ref 35–47)
HGB BLD-MCNC: 9.5 G/DL (ref 11.7–15.7)
IMM GRANULOCYTES # BLD: ABNORMAL 10*3/UL
IMM GRANULOCYTES NFR BLD: ABNORMAL %
IMMUKNOW IMMUNE CELL FUNCTION: 43 NG/ML
INR PPP: 1.26 (ref 0.85–1.15)
LYMPHOCYTES # BLD AUTO: ABNORMAL 10*3/UL
LYMPHOCYTES # BLD MANUAL: 0.8 10E3/UL (ref 0.8–5.3)
LYMPHOCYTES NFR BLD AUTO: ABNORMAL %
LYMPHOCYTES NFR BLD MANUAL: 13 %
MAGNESIUM SERPL-MCNC: 1.8 MG/DL (ref 1.7–2.3)
MCH RBC QN AUTO: 34.7 PG (ref 26.5–33)
MCHC RBC AUTO-ENTMCNC: 31.4 G/DL (ref 31.5–36.5)
MCV RBC AUTO: 111 FL (ref 78–100)
MONOCYTES # BLD AUTO: ABNORMAL 10*3/UL
MONOCYTES # BLD MANUAL: 0.3 10E3/UL (ref 0–1.3)
MONOCYTES NFR BLD AUTO: ABNORMAL %
MONOCYTES NFR BLD MANUAL: 4 %
MYELOCYTES # BLD MANUAL: 0.1 10E3/UL
MYELOCYTES NFR BLD MANUAL: 2 %
NEUTROPHILS # BLD AUTO: ABNORMAL 10*3/UL
NEUTROPHILS # BLD MANUAL: 4.8 10E3/UL (ref 1.6–8.3)
NEUTROPHILS NFR BLD AUTO: ABNORMAL %
NEUTROPHILS NFR BLD MANUAL: 75 %
NRBC # BLD AUTO: 0 10E3/UL
NRBC BLD AUTO-RTO: 0 /100
O2/TOTAL GAS SETTING VFR VENT: 21 %
O2/TOTAL GAS SETTING VFR VENT: 30 %
OXYHGB MFR BLDV: 68 % (ref 70–75)
OXYHGB MFR BLDV: 75 % (ref 70–75)
PCO2 BLDV: 61 MM HG (ref 40–50)
PCO2 BLDV: 66 MM HG (ref 40–50)
PH BLDV: 7.31 [PH] (ref 7.32–7.43)
PH BLDV: 7.32 [PH] (ref 7.32–7.43)
PHOSPHATE SERPL-MCNC: 4.3 MG/DL (ref 2.5–4.5)
PLAT MORPH BLD: ABNORMAL
PLATELET # BLD AUTO: 261 10E3/UL (ref 150–450)
PO2 BLDV: 38 MM HG (ref 25–47)
PO2 BLDV: 43 MM HG (ref 25–47)
POLYCHROMASIA BLD QL SMEAR: SLIGHT
POTASSIUM SERPL-SCNC: 3.6 MMOL/L (ref 3.4–5.3)
PROT SERPL-MCNC: 5.8 G/DL (ref 6.4–8.3)
RBC # BLD AUTO: 2.74 10E6/UL (ref 3.8–5.2)
RBC MORPH BLD: ABNORMAL
SAO2 % BLDV: 69 % (ref 70–75)
SAO2 % BLDV: 76 % (ref 70–75)
SODIUM SERPL-SCNC: 136 MMOL/L (ref 135–145)
TARGETS BLD QL SMEAR: ABNORMAL
TME LAST DOSE: NORMAL H
TME LAST DOSE: NORMAL H
WBC # BLD AUTO: 6.4 10E3/UL (ref 4–11)

## 2024-04-03 PROCEDURE — 250N000013 HC RX MED GY IP 250 OP 250 PS 637

## 2024-04-03 PROCEDURE — 250N000012 HC RX MED GY IP 250 OP 636 PS 637: Performed by: NURSE PRACTITIONER

## 2024-04-03 PROCEDURE — 85027 COMPLETE CBC AUTOMATED: CPT

## 2024-04-03 PROCEDURE — 99207 PR NO BILLABLE SERVICE THIS VISIT: CPT

## 2024-04-03 PROCEDURE — 92526 ORAL FUNCTION THERAPY: CPT | Mod: GN

## 2024-04-03 PROCEDURE — 250N000009 HC RX 250

## 2024-04-03 PROCEDURE — 92610 EVALUATE SWALLOWING FUNCTION: CPT | Mod: GN

## 2024-04-03 PROCEDURE — 85610 PROTHROMBIN TIME: CPT | Performed by: STUDENT IN AN ORGANIZED HEALTH CARE EDUCATION/TRAINING PROGRAM

## 2024-04-03 PROCEDURE — 200N000002 HC R&B ICU UMMC

## 2024-04-03 PROCEDURE — 99233 SBSQ HOSP IP/OBS HIGH 50: CPT | Mod: 24 | Performed by: INTERNAL MEDICINE

## 2024-04-03 PROCEDURE — 83735 ASSAY OF MAGNESIUM: CPT | Performed by: STUDENT IN AN ORGANIZED HEALTH CARE EDUCATION/TRAINING PROGRAM

## 2024-04-03 PROCEDURE — 84100 ASSAY OF PHOSPHORUS: CPT | Performed by: STUDENT IN AN ORGANIZED HEALTH CARE EDUCATION/TRAINING PROGRAM

## 2024-04-03 PROCEDURE — 94799 UNLISTED PULMONARY SVC/PX: CPT

## 2024-04-03 PROCEDURE — 99207 PR NO CHARGE SIGN-OFF PS: CPT | Performed by: STUDENT IN AN ORGANIZED HEALTH CARE EDUCATION/TRAINING PROGRAM

## 2024-04-03 PROCEDURE — 250N000012 HC RX MED GY IP 250 OP 636 PS 637

## 2024-04-03 PROCEDURE — 80053 COMPREHEN METABOLIC PANEL: CPT

## 2024-04-03 PROCEDURE — 80158 DRUG ASSAY CYCLOSPORINE: CPT | Performed by: NURSE PRACTITIONER

## 2024-04-03 PROCEDURE — 82805 BLOOD GASES W/O2 SATURATION: CPT

## 2024-04-03 PROCEDURE — 99207 PR NO BILLABLE SERVICE THIS VISIT: CPT | Performed by: PHYSICIAN ASSISTANT

## 2024-04-03 PROCEDURE — 250N000013 HC RX MED GY IP 250 OP 250 PS 637: Performed by: INTERNAL MEDICINE

## 2024-04-03 PROCEDURE — 250N000013 HC RX MED GY IP 250 OP 250 PS 637: Performed by: STUDENT IN AN ORGANIZED HEALTH CARE EDUCATION/TRAINING PROGRAM

## 2024-04-03 PROCEDURE — 999N000157 HC STATISTIC RCP TIME EA 10 MIN

## 2024-04-03 PROCEDURE — 97530 THERAPEUTIC ACTIVITIES: CPT | Mod: GO

## 2024-04-03 PROCEDURE — 85007 BL SMEAR W/DIFF WBC COUNT: CPT

## 2024-04-03 PROCEDURE — 99233 SBSQ HOSP IP/OBS HIGH 50: CPT | Performed by: NURSE PRACTITIONER

## 2024-04-03 PROCEDURE — 94640 AIRWAY INHALATION TREATMENT: CPT

## 2024-04-03 PROCEDURE — 99233 SBSQ HOSP IP/OBS HIGH 50: CPT | Mod: FS | Performed by: PHYSICIAN ASSISTANT

## 2024-04-03 PROCEDURE — 97110 THERAPEUTIC EXERCISES: CPT | Mod: GP | Performed by: PHYSICAL THERAPIST

## 2024-04-03 PROCEDURE — 97116 GAIT TRAINING THERAPY: CPT | Mod: GP | Performed by: PHYSICAL THERAPIST

## 2024-04-03 PROCEDURE — 94640 AIRWAY INHALATION TREATMENT: CPT | Mod: 76

## 2024-04-03 PROCEDURE — 97535 SELF CARE MNGMENT TRAINING: CPT | Mod: GO

## 2024-04-03 RX ORDER — HYDRALAZINE HYDROCHLORIDE 20 MG/ML
10-20 INJECTION INTRAMUSCULAR; INTRAVENOUS EVERY 6 HOURS PRN
Status: DISCONTINUED | OUTPATIENT
Start: 2024-04-03 | End: 2024-04-15 | Stop reason: HOSPADM

## 2024-04-03 RX ORDER — CYCLOSPORINE 100 MG/ML
100 SOLUTION ORAL
Status: DISCONTINUED | OUTPATIENT
Start: 2024-04-03 | End: 2024-04-09

## 2024-04-03 RX ADMIN — ACETYLCYSTEINE 2 ML: 100 SOLUTION ORAL; RESPIRATORY (INHALATION) at 21:18

## 2024-04-03 RX ADMIN — AMIODARONE HYDROCHLORIDE 200 MG: 200 TABLET ORAL at 08:30

## 2024-04-03 RX ADMIN — ACETYLCYSTEINE 2 ML: 100 SOLUTION ORAL; RESPIRATORY (INHALATION) at 08:40

## 2024-04-03 RX ADMIN — METOPROLOL TARTRATE 25 MG: 25 TABLET, FILM COATED ORAL at 08:31

## 2024-04-03 RX ADMIN — PREDNISONE 2.5 MG: 2.5 TABLET ORAL at 20:34

## 2024-04-03 RX ADMIN — APIXABAN 2.5 MG: 2.5 TABLET, FILM COATED ORAL at 20:34

## 2024-04-03 RX ADMIN — CLONIDINE HYDROCHLORIDE 0.1 MG: 0.1 TABLET ORAL at 21:54

## 2024-04-03 RX ADMIN — SEVELAMER CARBONATE 0.8 G: 800 POWDER, FOR SUSPENSION ORAL at 08:33

## 2024-04-03 RX ADMIN — Medication 40 MG: at 08:32

## 2024-04-03 RX ADMIN — Medication 40 MG: at 20:35

## 2024-04-03 RX ADMIN — CYCLOSPORINE 100 MG: 100 SOLUTION ORAL at 18:16

## 2024-04-03 RX ADMIN — CALCIUM CARBONATE 600 MG (1,500 MG)-VITAMIN D3 400 UNIT TABLET 1 TABLET: at 08:30

## 2024-04-03 RX ADMIN — Medication 2.5 MCG: at 08:29

## 2024-04-03 RX ADMIN — OLANZAPINE 5 MG: 5 TABLET, ORALLY DISINTEGRATING ORAL at 21:54

## 2024-04-03 RX ADMIN — PREDNISONE 5 MG: 5 TABLET ORAL at 08:30

## 2024-04-03 RX ADMIN — LEVOTHYROXINE SODIUM 25 MCG: 0.03 TABLET ORAL at 06:03

## 2024-04-03 RX ADMIN — LEVALBUTEROL HYDROCHLORIDE 1.25 MG: 1.25 SOLUTION RESPIRATORY (INHALATION) at 17:05

## 2024-04-03 RX ADMIN — ACETYLCYSTEINE 2 ML: 100 SOLUTION ORAL; RESPIRATORY (INHALATION) at 12:53

## 2024-04-03 RX ADMIN — CALCIUM CARBONATE 600 MG (1,500 MG)-VITAMIN D3 400 UNIT TABLET 1 TABLET: at 11:25

## 2024-04-03 RX ADMIN — APIXABAN 2.5 MG: 2.5 TABLET, FILM COATED ORAL at 08:31

## 2024-04-03 RX ADMIN — CYCLOSPORINE 125 MG: 100 SOLUTION ORAL at 08:32

## 2024-04-03 RX ADMIN — Medication 2.5 MCG: at 20:34

## 2024-04-03 RX ADMIN — SEVELAMER CARBONATE 0.8 G: 800 POWDER, FOR SUSPENSION ORAL at 20:35

## 2024-04-03 RX ADMIN — LEVALBUTEROL HYDROCHLORIDE 1.25 MG: 1.25 SOLUTION RESPIRATORY (INHALATION) at 08:40

## 2024-04-03 RX ADMIN — LEVALBUTEROL HYDROCHLORIDE 1.25 MG: 1.25 SOLUTION RESPIRATORY (INHALATION) at 21:18

## 2024-04-03 RX ADMIN — ACETYLCYSTEINE 2 ML: 100 SOLUTION ORAL; RESPIRATORY (INHALATION) at 17:05

## 2024-04-03 RX ADMIN — LIDOCAINE 4% 1 PATCH: 40 PATCH TOPICAL at 20:32

## 2024-04-03 RX ADMIN — METOPROLOL TARTRATE 25 MG: 25 TABLET, FILM COATED ORAL at 20:34

## 2024-04-03 RX ADMIN — CALCIUM CARBONATE 600 MG (1,500 MG)-VITAMIN D3 400 UNIT TABLET 1 TABLET: at 18:16

## 2024-04-03 RX ADMIN — LEVALBUTEROL HYDROCHLORIDE 1.25 MG: 1.25 SOLUTION RESPIRATORY (INHALATION) at 12:53

## 2024-04-03 ASSESSMENT — ACTIVITIES OF DAILY LIVING (ADL)
ADLS_ACUITY_SCORE: 39

## 2024-04-03 NOTE — PROGRESS NOTES
[facility]  Palliative Care Sign-Off Note    Sofie Rodriguez is a 63 year old female with PMH COPD s/p bilateral lung transplant 6/28/22, hemidiaphragm palsy, PEG dependence, ESRD on HD, and SIBO     Initially admitted on 2/10/24 with worsening malnutrition requiring TPN initiation. Her hospital course has been complicated by worsening CO2 retention, pulmonary edema, and AMS requiring BiPAP and intubation with ICU transfer on 2/17. She was extubated and transferred out of the ICU on 2/19. Tolerated iHD on 2/02. Readmitted to the ICU on 2/28 for monitoring due to hypercarbia and respiratory acidosis but improved with AVAPS and transferred back to the floor 2/29. She has been struggling with needing HD x4 weekly from fluid overload from TPN (unable to tolerate po intake due to GI issues) and declining AVAPS due to discomfort. Palliative signed off 3/7. We were reconsulted 3/11 for goals of care. Readmitted to the MICU again for hypercapnic RF (pH 7.05, pCO2 102) on 3/18 on continuous BiPAP needing reintubation 3/20.     Continued goals of care conversations yielded that Sofie states she will wear the BiPAP at night and with naps and O2 if needed while awake. She was extubated 4/2. Her goal is to still get home. If she suffers from hypercapnic respiratory failure, she is amenable to trach placement.     Palliative Care was consulted for goals of care and support . At this time the patient does not have any needs we are actively addressing, and we are signing off.    However we know their condition may change -- please re-consult us if we can be helpful at any time in the future.     Thank you for the opportunity to be involved in the care of this patient.    The patient was not seen. This is a non-billable note.    Primitivo Pink DO / Palliative Medicine   Securely message with the Vocera Web Console (learn more here)   Text page via [employer] Paging/Directory

## 2024-04-03 NOTE — PROGRESS NOTES
Nephrology Progress Note  04/03/2024         Assessment & Recommendations:   Sofie Rodriguez is a 63 year old year old female with ESKD, COPD s/p b/l lung transplant in 6/2022 with multiple complications, gastroparesis s/p GJ tube, GIB, chronic diarrhea, recurrent c-diff, FTT with inability to tolerate any tube feeds, R hip fx s/p ORIF 12/2023, admitted on 2/10 for initiation of TPN/lipids, transferred to ICU on 2/17 with worsening mental status and respiratory acidosis in spite of bipap, ultimately intubated on 2/18, being treated for PNA, also with volume overload in setting of high volume TPN. Persistent respiratory acidosis in spite of volume off, transferred to ICU for hypotension and respiratory failure, improved on bipap, now floor status again 3/1. Transferred to ICU 3/18 again with worsening hypercapnic respiratory failure. Transferred to floor 3/20, back to ICU 3/24    # ESKD - TTS, JANAY SAEZG, 3hr, 45.5 kg, [facility], Dr. Andres Fairbanks.  - dialyzed daily last week, now back to euvolemia and will continue HD per TTS schedule, extra runs if needed. Pt is agreeable to 3.5 hr runs if it means avoiding 4x/week dialysis  - Requires EMLA cream an hour before HD  - please avoid PICC which will compromise future dialysis accesses; a midline is much preferred for this patient    # Persistent respiratory acidosis: when off vent  - difficulty tolerating bipap due to claustrophobia, but wearing this lately  - balancing act between giving bicarb to maintain pH in setting of severe persistent respiratory acidosis with bicarb quickly converting CO2 and worsening overall status  - recommend stopping PO bicarb, ok to restart for pH < 7.1 but would stop again once pH is 7.2; will continue to provide bicarb with dialysis  - transplant pulm following  - re-intubated 3/24 for bronch/BAL (NGTD), status improving with less trach support. Extubated 4/2, doing well, on RA (bipap when sleeping)    # Aflutter: on amio and  "BB  # Volume/BP: EDW 45-45.5 kg. Anuric; on metoprolol soln 25 mg bid   - CXR 3/25 with likely pulm edema, dialyzed daily last week, now appears euvolemic and back to EDW  - weights are quite variable, ?accuracy        # Nutrition: on [employer] tube feeds     # Anemia 2/2 ESKD  On Venofer 50 qwk, Mircera last dose 1/9/2024  - hgb 8's  - iron labs 3/31: ferritin 1007, iron 34, IS 26 (at goal)  - Will continue Venofer 50 mcg q week (Tuesday)  - continue epogen 8000 units via HD    # BMD:   - Ca 8-9's, phos 6.8, alb 3.5  - restarted sevelamer          Recommendations were communicated to primary team via note     RABIA Moctezuma   Division of Renal Disease and Hypertension  [phone]    Interval History :   Seen bedside, extubated yesterday. Doing great, on RA, bipap when sleeping. No n/v, CP, chills    Review of Systems:   4 point ROS neg other than as noted above    Physical Exam:   I/O last 3 completed shifts:  In: 1370 [NG/GT:670]  Out: -    BP (!) 145/68   Pulse 71   Temp 98.2  F (36.8  C) (Oral)   Resp 19   Ht 1.57 m (5' 1.81\")   Wt 41.4 kg (91 lb 4.8 oz)   SpO2 91%   BMI 16.80 kg/m       GENERAL APPEARANCE: NAD  PULM: CTA  CV: RRR    Edema: none  GI: soft   INTEGUMENT: no cyanosis, no rashes on exposed skin  NEURO: a/o3  Access Left AVG     Labs:   All labs reviewed by me  Electrolytes/Renal -   Recent Labs   Lab Test 04/03/24  0351 04/02/24  2305 04/02/24  0306 04/01/24  0750 04/01/24  0515     --  137  --  138   POTASSIUM 3.6  --  3.6  --  3.5   CHLORIDE 96*  --  94*  --  96*   CO2 29  --  26  --  27   BUN 29.6*  --  62.5*  --  47.7*   CR 2.56*  --  4.38*  --  3.47*   * 107* 125*   < > 126*   ESTUARDO 8.9  --  8.8  --  9.0   MAG 1.8  --  2.4*  --  2.4*   PHOS 4.3  --  6.8*  --  6.1*    < > = values in this interval not displayed.       CBC -   Recent Labs   Lab Test 04/03/24  0351 04/02/24  0306 04/01/24  0515   WBC 6.4 5.1 4.1   HGB 9.5* 8.8* 8.7*    213 211       LFTs -   Recent " Labs   Lab Test 04/03/24  0351 04/02/24  0306 04/01/24  0515   ALKPHOS 88 80 80   BILITOTAL 0.2 0.2 0.2   ALT 13 10 14   AST 15 13 14   PROTTOTAL 5.8* 5.4* 5.4*   ALBUMIN 3.6 3.4* 3.5       Iron Panel -   Recent Labs   Lab Test 03/31/24  0456 09/26/22  0555 09/03/22  1039   IRON 34* 54 21*   IRONSAT 26 22 9*   CARLOS 1,007* 769* 343*         Imaging:  All imaging studies reviewed by me.     Current Medications:  Current Facility-Administered Medications   Medication Dose Route Frequency Provider Last Rate Last Admin    acetylcysteine (MUCOMYST) 10 % nebulizer solution 2 mL  2 mL Nebulization 4x daily Corinna Curry RT   2 mL at 04/03/24 1253    amiodarone (PACERONE) tablet 200 mg  200 mg Oral or Feeding Tube Daily Sara Jorge APRN CNP   200 mg at 04/03/24 0830    apixaban ANTICOAGULANT (ELIQUIS) tablet 2.5 mg  2.5 mg Oral BID Betty Diaz MD   2.5 mg at 04/03/24 0831    calcium carbonate-vitamin D (CALTRATE) 600-10 MG-MCG per tablet 1 tablet  1 tablet Per J Tube TID w/meals Maribell Cloud MD   1 tablet at 04/03/24 1125    cloNIDine (CATAPRES) tablet 0.1 mg  0.1 mg Oral At Bedtime Virginia Fowler MD   0.1 mg at 04/02/24 2235    [Held by provider] cyanocobalamin (VITAMIN B-12) tablet 500 mcg  500 mcg Per Feeding Tube Daily Maribell Cloud MD   500 mcg at 03/22/24 0754    cycloSPORINE modified (GENERIC EQUIVALENT) microemulsion solution 100 mg  100 mg Per G Tube BID IS Sara Grayson NP        fiber modular (BANATROL TF) packet 1 packet  1 packet Per Feeding Tube Q12H Maribell Cloud MD   1 packet at 04/03/24 1311    heparin lock flush 10 UNIT/ML injection 5-20 mL  5-20 mL Intracatheter Q24H Betty Diaz MD   5 mL at 04/02/24 1416    levalbuterol (XOPENEX) neb solution 1.25 mg  1.25 mg Nebulization 4x Daily Raiza Jurado MD   1.25 mg at 04/03/24 1253    levothyroxine (SYNTHROID/LEVOTHROID) tablet 25 mcg  25 mcg Oral QAM AC Kalpana Merino, APRN CNP   25 mcg at 04/03/24 0603     Lidocaine (LIDOCARE) 4 % Patch 1 patch  1 patch Transdermal Q24h Maribell Cloud MD   1 patch at 04/02/24 2013    liothyronine (CYTOMEL) half-tab 2.5 mcg  2.5 mcg Oral BID Kalpana Merino APRN CNP   2.5 mcg at 04/03/24 0829    metoprolol tartrate (LOPRESSOR) tablet 25 mg  25 mg Per J Tube BID Sara Jorge APRN CNP   25 mg at 04/03/24 0831    OLANZapine zydis (zyPREXA) ODT tab 5 mg  5 mg Oral At Bedtime Betty Diaz MD   5 mg at 04/02/24 2235    pantoprazole (PROTONIX) 2 mg/mL suspension 40 mg  40 mg Per J Tube BID Maribell Cloud MD   40 mg at 04/03/24 0832    predniSONE (DELTASONE) tablet 5 mg  5 mg Per J Tube QAM Maribell Cloud MD   5 mg at 04/03/24 0830    And    predniSONE (DELTASONE) tablet 2.5 mg  2.5 mg Per J Tube QPM Maribell Cloud MD   2.5 mg at 04/02/24 2012    sevelamer carbonate (RENVELA) Packet 0.8 g  0.8 g Oral BID Claribel Franks MD   0.8 g at 04/03/24 0833    sodium chloride (PF) 0.9% PF flush 10 mL  10 mL Intracatheter Q8H Betty Diaz MD   10 mL at 04/03/24 1126    sodium chloride (PF) 0.9% PF flush 10-40 mL  10-40 mL Intracatheter Q8H Betty Diaz MD   10 mL at 04/03/24 1311    sulfamethoxazole-trimethoprim (BACTRIM) 400-80 MG per tablet 1 tablet  1 tablet Oral Once per day on Tuesday Thursday Saturday Claribel Franks MD   1 tablet at 04/02/24 2013     Current Facility-Administered Medications   Medication Dose Route Frequency Provider Last Rate Last Admin    dextrose 10% infusion   Intravenous Continuous PRN Miguel Angel Stone MD        Patient is already receiving anticoagulation with heparin, enoxaparin (LOVENOX), warfarin (COUMADIN)  or other anticoagulant medication   Does not apply Continuous PRN Ting Aceves MD Dawn M Fuglestad, PA I, Rayna Boland, saw and evaluated this patient as part of a shared visit.  I have reviewed and discussed with the advanced practice provider their history, physical and plan.  I have  personally performed the substantive portion of the medical decision making for this visit - please see the EMILY's documentation for the full details  I personally reviewed the vital signs, medications, labs and imaging.    Key findings and management decisions made by me:  ESRD, on dialysis TTS.  Her volume is stable and she was extubated yesterday after dialysis.  HD tomorrow per usual day, she is doing well, nightime bipap.      Rayna Boland  Date of Service (when I saw the patient): 4/3

## 2024-04-03 NOTE — PROGRESS NOTES
Pulmonary Medicine  Cystic Fibrosis - Lung Transplant Team  Progress Note  2024     Patient: Sofie Rodriguez  MRN: [id number]  : 1960 (age 63 year old)  Transplant: 2022 (Lung), POD#[id number]  Admission date: 2/10/2024    Assessment & Plan:     Sofie Rodriguez is a 63 year old female with h/o COPD s/p BSLT () with course complicated by post-operative hemidiaphragm palsy, recurrent PNAs, positive DSA, EBV viremia, hypogammaglobulinemia, severe gastroparesis s/p G/J tube placement (22) and pyloric botox (23), GI bleed 2/2 pyloric ulcer, hemobilia s/p ERCP and MRCP, chronic diarrhea, recurrent C diff colitis, ESRD on iHD, recurrent falls with injuries (recent right hip fx s/p ORIF 2023), deconditioning, and FTT.  Admitted 2/10 for failure to thrive and initiation of TPN/lipids.  Emergent intubation - for hypoxic and hypercapneic respiratory failure and encephalopathy. Returned to ICU - and again 3/18-3/20 d/t tenuous respiratory status complicated by variable daytime NIPPV tolerance and hypervolemia with iHD limited d/t hypotension. Again transferred back to ICU 3/24 for intubation and bronch/BAL given hypoxic and hypercapneic respiratory failure. CT with extensive bilateral infiltrate and etiology likely multifactorial including volume overload and infection, possible mucous plugging, ACR or AMR less likely.  Tolerating continuous PS after recurrent period of aggressive volume removal with dialysis, extubated 4/2 to minimal supplemental O2 and weaned to RA 4/3.       Today's recommendations:  - Following bronch cultures from 3/24  - Daily VBG and PRN with goal for permissive hypercapnea to mid 60's  - Will need to pursue tracheostomy placement if pt. fails extubation (pt. agreeable) but must acknowledge that this will likely eliminate most if not all disposition options given concurrent need for iHD  - Strict adherence to NIPPV overnight (BiPAP vs AVAPS) and with all  naps, should obtain home NIPPV machine and work to transition to long-term device while inpatient (need to clarify if patient still has Trilogy machine at home)  - ImmuKnow 4/1 pending  - CSA level today slightly supratherapeutic, dose decreased. Repeat level 4/6, ordered  - DSA and EBV due 4/23  - SLP swallow evaluation today prior to resuming diet     Acute on chronic hypoxic/hypercapneic respiratory failure:  S/p bilateral sequential lung transplant for COPD:   Hypoventilation, Suspected CARLEE:  PFTs in clinic remained significantly below her baseline.  Baseline hypoxia with 2L NC overnight PTA.  S/p intubation 2/17-2/19 for acute hypoxic/hypercapneic respiratory failure with encephalopathy, CT with concern for infection and pulmonary edema given recent addition of TPN nutrition.  Bronch (2/18) with very friable tissue, cutlures only with C. glabrata.  Persistent respiratory failure also complicated by hypoventilation and deconditioning d/t malnutrition. Neurology consulted 2/27, MRI brain (3/1) without acute intracranial pathology.  SNIFF (3/8) normal.  Metabolic CART suggested overfeeding on TPN.  Returned to ICU again 3/18-3/20 d/t tenuous respiratory status complicated by NIPPV intolerance and hypervolemia with iHD limited d/t hypotension (CXR with increased pulmonary edema).  Overall, her PCO2 retention is likely multifactorial with a component being from overfeeding (though remedied with nutrition adjustment), metabolic compensation barrier d/t renal failure, pulmonary edema, bicarb loss with diarrhea, hypoventilation with declining NIF and FVC concerning for neuromuscular etiology (though Neurology consult was not revealing), and NIPPV intolerance due to claustrophobia. Worsening hypoxic and hypercapneic respiratory failure 3/24 and transferred to ICU for intubation. CT chest with extensive bilateral infiltrates markedly increased from previous. Bronch with small L>R sided secretions easily suctioned, BAL  with no evidence of DAH, non bloody. Etiology likely multifactorial including volume overload and infection, ACR or AMR less likely (as below).  Zosyn (3/23-4/2) for 10 day empiric course. Hypoxia improving, hypercapnia stable with PS->extubated 4/2 to minimal supplemental O2 and weaned to RA 4/3.   - Blood culture (3/24) NGTD  - BAL (3/24) NGTD  - Aggressive airway clearance with Volera (intrapulmonary percussion) QID and nebs: Xopenex and Mucomyst QID  - Daily VBG with goal for permissive hypercapnea to mid 60's, will need to pursue tracheostomy placement if pt. fails extubation (pt. agreeable) but must acknowledge that this will likely eliminate most if not all disposition options given concurrent need for iHD  - Strict adherence to NIPPV overnight and with all naps after extubation, should obtain home NIPPV machine and work to transition to long-term device while inpatient     Immunosuppression:  AZA previously stopped 5/2023 d/t low ImmuKnow assay and EBV viremia.  ImmuKnow (2/27) remained low at 88.  Transitioned from [place] to CSA 3/11.  - ImmuKnow 4/1 pending  -  BID (dose held 3/30 PM and decreased 3/31).  Goal 140-170.  Repeat level 4/3 slightly supratherapeutic, dose decreased to 100 mg BID. Repeat level 4/6, ordered  - Prednisone 5 mg qAM / 2.5 mg qPM     Prophylaxis:   - Bactrim qMWF for PJP ppx (3/13, prior dapsone through 3/11)  - CMV ppx not currently indicated, D+/R+, CMV negative 3/18     Positive DSA: AMR treatment deferred given frailty and stable PFTs.  DSA DQB2 decreased on 3/6 with 5667 mfi, and again decreased to 4960 on 3/23.  Most recent cell-free DNA (2/18) mildly elevated at 1.04 (concerning for possible rejection), which was increased from prior level of 0.12 (1/10).  IST increase deferred at that time per Dr. Gong.  S/p IVIG (2/27) for DSA (IgG WNL).  Immuknow as above.  Prospera (cell free DNA) 0.44 (3/18) suggests AMR less likely, follow  - Repeat DSA monthly (due 4/23)       EBV viremia: CT CAP (2/7) without lymphadenopathy.  Recent levels improving (3/18) 23k.  - Repeat EBV due 4/23     Other relevant problems being managed by the primary team:      FTT:  Severe protein calorie malnutrition:  Gastroparesis s/p PEG/J, botox, and G-POEM:  SB hypomotility:  Pyloric ulcer:  Chronic nausea and osmotic diarrhea:  SIBO s/p rifaximin:   Recurrent C diff colitis: Chronic osmotic and infectious diarrhea since transplant with recurrent episodes of C diff.  Notable weight loss (40# in a year) d/t diarrhea, GI dysmotility, and intolerance of enteral feeds (PEG/J tube in place), most recently on elemental formula.  Extensive OP eval and f/u with GI. S/p port placement for TPN and lipids. Continued for ~5 weeks inpatient without considerable improvement, transitioned back to TF.  C diff positive (3/13), on fidaxomicin.     ESRD: CT with volume overload secondary to TPN volume, iHD increased from PTA TThSa schedule with unsustained improvement.  Management per Nephrology, dialysis via Bhagat CVC.  Unable to remove fluid on 3/23 d/t hypotension, tolerance now improved with midodrine on HD days.  - Volume removal and dialysis per nephrology     Goals of care: Care conference held with palliative and primary team on 3/15 for medical update with pt. and daughters.  Repeat care conference 3/29 (see palliative and MICU notes) patient would like to proceed with: plan for extubation when able, hope to tolerate some bipap, if doesn't go well then reintubate with trach OR pursue comfort measures. Pt and her family are still thinking about overall what they would do if bipap were not to work or she wouldn't tolerate it.       We appreciate the excellent care provided by the MICU team.  Recommendations communicated via this note.  Will continue to follow along closely, please do not hesitate to call with any questions or concerns.     Patient discussed with Dr. Castillo.     Sara Grayson, APRN, CNP    Inpatient Nurse Practitioner  Pulmonary CF/Transplant      Subjective & Interval History:     Tolerated PS at 5/5 and then T piece trial for 2 hours yesterday prior to extubation in the afternoon. Initially on 4L NC.  Tolerated BiPAP overnight for 8 hrs.  Initially at 12/6 30% but increased to 14/7 early morning for low TV <300ml.  AM VBG (prior to pressure increase) was 7.31/66.  Breathing is comfortable, no ANAYA, infrequent cough minimally productive. Stools loose, stable. Remains on TF. HD yesterday with 3L removed.    Review of Systems:     C: No fever, no chills  INTEGUMENTARY/SKIN: No rash or obvious new lesions  ENT/MOUTH: No sore throat, no sinus pain, no nasal congestion or drainage  RESP: See interval history  CV: No chest pain, no palpitations, no peripheral edema, no orthopnea  GI: No nausea, no vomiting, no reflux symptoms  : No dysuria  MUSCULOSKELETAL: No myalgias, no arthralgias  ENDOCRINE: Blood sugars with adequate control  NEURO: No headache, no numbness or tingling  PSYCHIATRIC: Mood stable    Physical Exam:     All notes, images, and labs from past 24 hours (at minimum) were reviewed.    Vital signs:  Temp: 98  F (36.7  C) Temp src: Axillary BP: (!) 156/64 Pulse: 72   Resp: 20 SpO2: 98 % O2 Device: None (Room air) Oxygen Delivery: 4 LPM   Weight: 41.4 kg (91 lb 4.8 oz)  I/O:   Intake/Output Summary (Last 24 hours) at 4/3/2024 1053  Last data filed at 4/3/2024 0800  Gross per 24 hour   Intake 1421 ml   Output 3150 ml   Net -1729 ml     Constitutional: sitting up in chair, in no apparent distress.   HEENT: Eyes with pink conjunctivae, anicteric.  Oral mucosa moist without lesions.   PULM: Fair air flow bilaterally.  No crackles, no rhonchi, no wheezes.  Non-labored breathing on RA.  CV: Normal S1 and S2.  RRR.  No murmur, gallop, or rub.  No peripheral edema.   ABD: NABS, soft, nontender, nondistended.  PEG/J tube site not visualized.    MSK: Moves all extremities.  +muscle wasting.   NEURO:  "Alert and conversant by mouthing words and writing   SKIN: Warm, dry, fragile, scattered ecchymosis.    PSYCH: Mood stable.     Data:     LABS    CMP:   Recent Labs   Lab 04/03/24  0351 04/02/24  2305 04/02/24  0306 04/01/24  0750 04/01/24  0515 03/31/24  0456     --  137  --  138 140   POTASSIUM 3.6  --  3.6  --  3.5 3.5   CHLORIDE 96*  --  94*  --  96* 99   CO2 29  --  26  --  27 29   ANIONGAP 11  --  17*  --  15 12   * 107* 125* 89 126* 130*   BUN 29.6*  --  62.5*  --  47.7* 27.4*   CR 2.56*  --  4.38*  --  3.47* 2.20*   GFRESTIMATED 21*  --  11*  --  14* 25*   ESTUARDO 8.9  --  8.8  --  9.0 8.8   MAG 1.8  --  2.4*  --  2.4* 1.6*   PHOS 4.3  --  6.8*  --  6.1* 4.4   PROTTOTAL 5.8*  --  5.4*  --  5.4* 5.6*   ALBUMIN 3.6  --  3.4*  --  3.5 3.4*   BILITOTAL 0.2  --  0.2  --  0.2 0.3   ALKPHOS 88  --  80  --  80 81   AST 15  --  13  --  14 17   ALT 13  --  10  --  14 13     CBC:   Recent Labs   Lab 04/03/24  0351 04/02/24  0306 04/01/24  0515 03/31/24  0456   WBC 6.4 5.1 4.1 4.7   RBC 2.74* 2.60* 2.56* 2.56*   HGB 9.5* 8.8* 8.7* 8.8*   HCT 30.3* 28.5* 28.9* 28.9*   * 110* 113* 113*   MCH 34.7* 33.8* 34.0* 34.4*   MCHC 31.4* 30.9* 30.1* 30.4*   RDW 16.3* 16.1* 16.4* 16.5*    213 211 204       INR:   Recent Labs   Lab 04/03/24  0351 04/02/24  0306 04/01/24  0515 03/31/24  0456   INR 1.26* 1.35* 1.29* 1.27*       Glucose:   Recent Labs   Lab 04/03/24  0351 04/02/24  2305 04/02/24  0306 04/01/24  0750 04/01/24  0515 03/31/24  0456   * 107* 125* 89 126* 130*       Blood Gas:   Recent Labs   Lab 04/03/24  0351 04/02/24  2304 04/02/24  1417   PHV 7.31* 7.33 7.36   PCO2V 66* 61* 59*   PO2V 43 45 45   HCO3V 33* 32* 33*   LINDY 5.1* 5.0* 6.6*   O2PER 30 36 40       Culture Data No results for input(s): \"CULT\" in the last 168 hours.    Virology Data:   Lab Results   Component Value Date    FLUAH1 Not Detected 03/24/2024    FLUAH3 Not Detected 03/24/2024    [id number] Not Detected 03/24/2024    IFLUB Not " Detected 03/24/2024    RSVA Not Detected 03/24/2024    RSVB Not Detected 03/24/2024    PIV1 Not Detected 03/24/2024    PIV2 Not Detected 03/24/2024    PIV3 Not Detected 03/24/2024    HMPV Not Detected 03/24/2024       Historical CMV results (last 3 of prior testing):  Lab Results   Component Value Date    CMVQNT Not Detected 03/18/2024    CMVQNT Not Detected 02/19/2024    CMVQNT Not Detected 02/07/2024     Lab Results   Component Value Date    CMVLOG 3.2 07/12/2023    CMVLOG <2.1 04/19/2023    CMVLOG 3.5 01/25/2023       Urine Studies    Recent Labs   Lab Test 02/18/24  0222 05/18/23  0627   URINEPH 7.5* 5.0   NITRITE Negative Negative   LEUKEST Trace* Moderate*   WBCU 66* 21*       Most Recent Breeze Pulmonary Function Testing (FVC/FEV1 only)  FVC-Pre   Date Value Ref Range Status   02/07/2024 1.19 L    01/10/2024 1.12 L    08/29/2023 1.48 L    07/25/2023 1.55 L      FVC-%Pred-Pre   Date Value Ref Range Status   02/07/2024 42 %    01/10/2024 39 %    08/29/2023 53 %    07/25/2023 55 %      FEV1-Pre   Date Value Ref Range Status   02/07/2024 1.13 L    01/10/2024 1.10 L    08/29/2023 1.43 L    07/25/2023 1.54 L      FEV1-%Pred-Pre   Date Value Ref Range Status   02/07/2024 51 %    01/10/2024 49 %    08/29/2023 64 %    07/25/2023 69 %        IMAGING    No results found for this or any previous visit (from the past 48 hour(s)).

## 2024-04-03 NOTE — PROGRESS NOTES
Care Management Follow Up    Length of Stay (days): 53    Expected Discharge Date:  unknown     Concerns to be Addressed: discharge planning,    Patient plan of care discussed at interdisciplinary rounds: Yes    Anticipated Discharge Disposition: Transitional Care, Home Care, Dialysis Services     Anticipated Discharge Services: None  Anticipated Discharge DME: None    Patient/family educated on Medicare website which has current facility and service quality ratings:  n/a  Education Provided on the Discharge Plan: n/a  Patient/Family in Agreement with the Plan: n/a    Referrals Placed by CM/SW: n/a  Private pay costs discussed: Not applicable    Additional Information:  In follow up to patient's care conference last week, called patients dialysis unit:  [facility] [place] ,[phone]. Spoke with Ksenia RITTER.  Updated her on patient situation.  Now extubated, hopefully won't need re-intubation, but if she does she could need tracheostomy  I asked if their center could ever manage patient with trach on dialysis.  She stated they had not previously, but offered to call [facility] to discuss. Received call back from Ksenia.  She was instructed that 'a stable, well healed tracheostomy that could remain capped during dialysis treatment and that required no suctioning would be considered on a case by case basis'.  She could not guarantee that patient would be accepted back should she need a trach, but stated it would at least be considered, depending on her stability. Updated pulm team with this information.        Mana Valdivia [facility]  Lung Transplant   Phone: [phone]     [facility]

## 2024-04-03 NOTE — PROGRESS NOTES
ICU Daily Rounding Checklist     Checklist Response Notes   Can sedation be reduced?  No    Can analgesia be reduced? Yes Discontinued hydroxyzine    Is delirium being assessed, addressed and prevented? Yes    Spontaneous awakening trial and/or Spontaneous breathing trial candidate?  NA On room air    Total fluid balance goal reviewed?  Yes Target Goals: On dialysis          Is the patient at goals for lung protective ventilation? NA    Head of bed elevation (30 degrees)? NA    Skin breakdown assessment (prevention) completed? Yes    Is enteral nutrition at goal? Yes Regular diet and tube feeds    Is blood glucose at goal? Yes    Deep venous thrombosis prophylaxis? Yes      Gastric ulcer prophylaxis?  Yes If coagulopathy (INR-1.5 PTT2x normal. Ph<50k), mechanical ventilation 48hr, history of GI bleed/ulcer within past year. TBL, SCI, or burn, or if >= 2 minor risk factors (sepsis, ICU stay 1 week, occult GI bleed > 6 days. glucocorticoid therapy, NSAID use, antiplatelet use)   Can Antibiotics be narrowed or discontinued? NA    Early mobility candidate and physical therapy consulted? Yes    Is montgomery catheter needed? NA    Is central venous/arterial catheter needed? Yes    Has the family been updated? Yes    Are the patient's goals of care and code status current? Yes

## 2024-04-03 NOTE — PROGRESS NOTES
CLINICAL NUTRITION SERVICES - BRIEF NOTE     New Findings/Chart Review:  Pt extubated and SLP passed pt for Regular diet.     Interventions:  Given ongoing loose stools, starting Banatrol TF q12h - provides total of 25 g fiber daily with TF    Continuing EN support as ordered given pt demonstrating appetite and interest in PO while having been on TF - also, h/o sensitivities with changes in TF rate per notes from other RDs.     Nutrition will continue to follow per protocol.    Corina Butler RD, LD  Available on [employer] - can search by name or unit Dietitian  **Clinical Nutrition is no longer available via pager

## 2024-04-03 NOTE — PROGRESS NOTES
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 Course complicated by recurrent and progressive acute on chronic hypoxic and hypercarbic respiratory failure requiring multiple ICU admissions and intubation 2/18-2/19, 2/28-2/29, 3/18-3/20, for continuous BiPAP. Again with worsening respiratory acidosis and hypercarbia, concern for infection vs acute rejection, requiring transfer back to ICU 3/20/2024 for intubation and bronchoscopy. Clinical swallow eval completed per MD order s/p extubation.   General Observations Upon SLP arrival, pt positioned upright in chair, alert, and willing to participate.   Type of Evaluation   Type of Evaluation Swallow Evaluation   Oral Motor   Oral Musculature generally intact   Structural Abnormalities none present   Mucosal Quality good;adequate   Dentition (Oral Motor)   Dentition (Oral Motor) natural dentition;some missing teeth   Facial Symmetry (Oral Motor)   Facial Symmetry (Oral Motor) WNL   Lip Function (Oral Motor)   Lip Range of Motion (Oral Motor) WNL   Tongue Function (Oral Motor)   Tongue ROM (Oral Motor) WNL   Jaw Function (Oral Motor)   Jaw Function (Oral Motor) WNL   Facial Sensation   Facial Sensation  WNL   Cough/Swallow/Gag Reflex (Oral Motor)   Volitional Throat Clear/Cough (Oral Motor) reduced strength   Vocal Quality/Secretion Management (Oral Motor)   Vocal Quality (Oral Motor) WFL   Secretion Management (Oral Motor) WNL   General Swallowing Observations   Current Diet/Method of Nutritional Intake (General Swallowing Observations, NIS) NPO   Past History of Dysphagia Pt is s/p BSLT (2022) and known to SLP department. Most recently, CSE completed 2/20/24 warranting a regular diet and thin liquids. FEES completed 7/2022 warranting a regular diet and thin liquids.   Respiratory Support nasal cannula   Swallowing Evaluation Clinical swallow evaluation   Clinical Swallow Evaluation   Feeding Assistance no assistance needed   Clinical Swallow Evaluation Textures Trialed thin liquids;pureed;solid foods   Clinical Swallow Eval: Thin Liquid Texture Trial   Mode of Presentation, Thin Liquids cup;straw;self-fed   Volume of Liquid or Food Presented 4 oz   Oral Phase of Swallow WFL   Pharyngeal Phase of Swallow intact   Diagnostic Statement No overt s/sx of aspiration   Clinical Swallow Evaluation: Puree Solid Texture Trial   Mode of Presentation, Puree spoon;self-fed   Volume of Puree Presented 4 tbsp   Oral Phase, Puree WFL   Pharyngeal Phase, Puree intact   Diagnostic Statement No overt s/sx of aspiration   Clinical Swallow Evaluation: Solid Food Texture Trial   Mode of Presentation self-fed   Volume Presented 1 cracker   Oral Phase WFL   Pharyngeal Phase intact   Diagnostic Statement No overt s/sx of aspiration   Esophageal Phase of Swallow   Patient reports or presents with symptoms of esophageal dysphagia   (Hx of gastroparesis)   Swallowing Recommendations   Diet Consistency Recommendations regular diet;thin liquids (level 0)   Supervision Level for Intake distant supervision needed   Mode of Delivery Recommendations bolus size, small;slow rate of intake   Swallowing Maneuver Recommendations alternate food and  liquid intake   Monitoring/Assistance Required (Eating/Swallowing) monitor for cough or change in vocal quality with intake;stop eating activities when fatigue is present   Recommended Feeding/Eating Techniques (Swallow Eval) maintain upright sitting position for eating;maintain upright posture during/after eating for 30 minutes;set-up and prepare tray   Medication Administration Recommendations, Swallowing (SLP) As tolerated   Instrumental Assessment Recommendations instrumental evaluation not recommended at this time   Clinical Impression   Criteria for Skilled Therapeutic Interventions Met (SLP Eval) Evaluation only   SLP Diagnosis Oropharyngeal swallow mechanism deemed WFL   Risks & Benefits of therapy have been explained evaluation/treatment results reviewed;care plan/treatment goals reviewed;risks/benefits reviewed;current/potential barriers reviewed;participants voiced agreement with care plan;participants included;patient   Clinical Impression Comments Clinical swallow eval completed per MD order. Pt's oropharyngeal swallow mechanism deemed WFL s/p extubation. Oral mech exam remarkable for reduced cough strength and some missing teeth. Pt assessed w/ thin liquids, puree, and solids. Oral phase WFL. Pharyngeal phase unremarkable, no overt s/sx of aspiration across trials. From an oropharyngeal swallowing standpoint, recommend regular diet and thin liquids, meds OK as tolerated. Ensure pt is fully upright and alert, taking small sips/bites at a slow rate. No additional SLP services warranted.   SLP Discharge Planning   SLP Plan s/o   SLP Discharge Recommendation   (defer to PT/OT)   SLP Rationale for DC Rec Oropharyngeal swallow mechanism deemed WFL   SLP Brief overview of current status  From an oropharyngeal swallowing standpoint, recommend regular diet and thin liquids, meds OK as tolerated. Ensure pt is fully upright and alert, taking small sips/bites at a slow rate. No additional SLP services warranted.    Total Session Time   Total Session Time (sum of timed and untimed services) 16

## 2024-04-03 NOTE — PLAN OF CARE
ICU End of Shift Summary. See flowsheets for vital signs and detailed assessment.    Changes this shift: No acute changes this shift. VSS on 4L NC while awake and Bipap 12/6 30% while sleeping, Bipap setting changed to 14/7 d/t low volumes around 6am. Up with standby assist. Multiple watery BM's this shift. TF at goal rate. Anuric.     Plan: Monitor CO2 and mental status, transfer to medicine.         Goal Outcome Evaluation:      Plan of Care Reviewed With: patient    Overall Patient Progress: improvingOverall Patient Progress: improving    Outcome Evaluation: Bipap for most of night,

## 2024-04-03 NOTE — PROGRESS NOTES
MEDICAL ICU PROGRESS NOTE  04/03/2024      Date of Service (when I saw the patient): 04/03/2024    ASSESSMENT: Sofie Rodriguez is a 63 year old female with PMH COPD s/p bilateral lung transplant 6/28/22 c/b hemidiaphragm palsy and recurrent pneumonias, gastroparesis and small bowel dysmotility complicated by severe malnutrition now s/p PEG/J, ESRD on M/W/F HD, recent R femoral fx s/p ORIF, chronic diarrhea, recurrent c-diff, failure to thrive with inability to tolerate any tube feeds, who was admitted to [facility] on 2/10/24 for concerns over malnutrition and TPN initiation via portacath. Course complicated by recurrent and progressive acute on chronic hypoxic and hypercarbic respiratory failure requiring multiple ICU admissions and intubation 2/18-2/19, 2/28-2/29, 3/18-3/20, for continuous BiPAP. Again with worsening respiratory acidosis and hypercarbia, concern for infection vs acute rejection, requiring transfer back to ICU 3/20/2024 for intubation and bronchoscopy.    CHANGES and MAJOR THINGS TODAY:     - pt extubated yesterday afternoon, tolerated bipap overnight, awake and alert this am.  - Needs home bipap  - VBG at 11:30  - pt needs to use BIPAP overnight, she is high risk for reintubation d/t hypercarbia.  - Seen by speech today; okay for regular diet with thin liquids, will continue tubefeeds via pts g/j tube (hx of gastroparesis requiring TPN)    PLAN:     Neuro:  # Acute pain  - Tylenol Q4 prn  - Lidocaine patch prn  - Heating pads prn    # Hx of Anxiety   # Claustrophobia  Reports claustrophobia contributing to limited compliance with BiPAP. Has seen health psych on 3/20, recommended grounding exercise (5-4-3-2-1) for use on BiPAP.   - Zyprexa 5mg at bedtime   - Atarax 25 mg TID PRN for anxiety  - Clonidine 0.1 mg at bedtime     # B/L Neuropathic Pain, resolved   New pain b/l this hospitalization. Last A1c <4.2 (7/11/23). Consider possibly 2/2 ESRD. TSH wnl. B12 and B1 elevated, B6 normal.  Copper low (56.3), zinc mildly low (48.3).   - Neuro Recs:  - No obvious clinical history or exam findings to suggest neurologic/neuromuscular causes of respiratory weakness  - Holding B12 supplement (supra-therapeutic 3/15)    Pulmonary:  # Acute on chronic hypoxic and hypercarbic respiratory failure requiring intubation 2/17-2/19 3/25-4/2, improved  # Recurrent PNAs, concern for acute infection vs acute rejection  # S/p BSLT 6/8/22 for COPD  # R hemidiaphragm palsy   # Positive DSA   # Suspected CARLEE  # PTA nocturnal O2, 2L   S/p bilateral lung transplant 6/28/22 for COPD. Was admitted 2/10 to address malnutrition and admitted to ICU on 2/17 with hypoxic hypercarbic respiratory failure. Intubated on 2/18 due to worsening oxygen requirements, likely due to pulmonary edema given hx of ESRD requiring HD vs infection given hx of lung transplant, immunosuppressed status, and recurrent pneumonias. Required intubation 2/17 - 2/19. Ongoing respiratory acidosis despite BiPAP/AVAPS, claustrophobic while using to intermittent compliance. Neurology consulted and MRI r/o central cause problem for respiratory drive. Started on AVAPS with improvement in mental status and VBG, and patient transferred back to medicine floor on 2/29. 3/08 SNIFF with no definite paradoxical movement of bilateral hemidiaphragm. Transferred back to ICU 3/18-3/20 d/t hypercarbia, severe respiratory acidosis, but did not require intubation during this time. Issues with anxiety/claustrophobia while using the mask, Atarax has helped though Zyprexa led to hallucinations. Even with pH this low, and pCO2 that high, mentation remains stable. CT chest 3/24 w/ scattered opacity throughout all lungs; dilated pulm artery. On BiPAP 16/5 at time of transfer. Transplant pulm concerned for infectious vs acute rejection picture, recommending intubation + bronchoscopy, have been following since. Worry there may be an idiopathic picture causing decreased respiratory  drive. Volume overload + pulmonary edema complicating picture, as patient has had low pressures limiting HD runs.   - Daily VBG  - Transplant pulm following, appreciate recs    - Immunosuppression:   >Prednisone 5 mg every morning, 2.5 mg every afternoon  >Cyclosporine 200mg BID (Stopped tacrolimus 3/10)   >Overnight oximetry study suggestive of O2 vs CPAP need, as did require up to 2LPM overnight to prevent hypoxia  >Try to minimize O2 to preserve respiratory drive, will give IVIG for DSA+   >D/c'd theophylline 3/3/24 due to difficulty attaining therapeutic levels (started by ICU), could consider Modafinil   >Repeat metabolic CART study ordered by Transplant Pulm   - Airway clearance/nebs:   - Xopenex neb BID  - Volume removal with iHD  - Sleep clinic eval at discharge for suspected CARLEE  - Limit medications that would depress respiration  - BIPAP at hs and naps at all times.  - High risk for reintubation given hx of hypercarbia   - Will need bipap machine for home prior to discharge    # Goals of Care  - 3/15 Care conference on with Transplant Pulm, Pall Care, Medicine, daughters (Julia Nash) and Transplant SW. Overall medical updates provided and Qs answered. With declining respiratory acidosis on labs, discussed code status 3/18. Patient initially not desiring any escalation of her care to ICU/intubation with frustration re overall course. However, after discussing with her daughter on the phone she and family elected to remain full code and agreed to ICU admission and intubation if necessary.   - 3/29 Care conference: Laid out a few options for family and patient to consider with qs answered. Examples being we could attempt to extubate to BiPAP but plan should be established prior to extubation that if patient decompensates and requires reintubation then likely pursue trach versus more comfort approach. Other option is going straight for trach now. Patient and family (daughter Julia and son present on Ipad)  considering the options and had a lot of questions about trach and what that would look like long-term, which was laid out for the family and patient.   - Pt made the decision this hospitalization, that if needed down the line she would be acceptable of a tracheostomy     Cardiovascular:  # A-flutter (recurrent on 3/17)  # A-fib, intermittent  # HTN  # HFpEF  Noted Afib/flutter intermittently throughout admission. Was started on amiodarone bolus with drip and transitioned to PO dosing.  2/17 TTE: LVEF 55-60%, global RV function normal, no significant valvular abnormalities. Briefly required levophed and midodrine for HD but otherwise stable off pressors.   - MAP goal > 65 mmHg   - Continue Metoprolol tartrate dose 25 mg BID (hold parameters if MAP<65 or hr less than 60, hold on dialysis days)  - Continue amiodarone 200 mg PO  - Midodrine with HD as needed      GI/Nutrition:  # Severe malnutrition   # Failure to thrive  # Hypoalbuminemia  # Gastroparesis, small bowel dysmotility  # S/P PEG/J with intolerance of enteral nutrition  # Chronic osmotic diarrhea/SIBO s/p Rifaximin  # Recurrent C.Diff (3rd ep 3/12/24)    # GERD  Patient with gastroparesis (presumed due to vagal injury) and small bowel dysmotility complicated by unintentional 40lb weight loss over the past year and now severe malnutrition. Previously intolerant to oral food intake due to nausea. Was initially admitted for portacath and TPN initiation since 2/13. Intermittently tolerating feeds without n/v from 2/20.  Per GI, no ongoing concerns for SIBO as of 2/23. As of 3/11 transplant pulmonology is worried that TPN is not a realistic plan to continue at home and would like to transition back to TF (RD oncerned pt is not absorbing any oral intake). TPN discontinued 3/16. Transplant pulm also worried about mucosal toxicity. Recurrent C.diff 3/12, Fidaxomicin x 10 days (3/13-3/22), with improvement in diarrhea. Transplant pulm requesting repeat colonoscopy  w/ Bx after completion of c.diff treatment, to r/o toxicity or other reason that diarrhea is present.   - Nutrition consulted, appreciate assistance  - Continue TF at goal rate 35 ml/hr via PEG/J          - Consider reconsult GI week of 3/25 after metabolic CART study, for future colonoscopy +/- biopsy if diarrhea returns   - PPI BID  - Bowel regimen PRN  - Confirmed Osmotic diarrhea with stool studies--> Likely not infectious, continue adjusting TF and loperamide PRN  - Speech saw pt 4/3 okay for regular diet with thin liquids; will continue pt tube feeds via pts g/j tube (hx of gastroparesis requiring TPN) until pt able to take in adequate PO nutrition      Renal/Fluids/Electrolytes:  # ESRD on HD  # Mild hyperphosphatemia  Patient is ESRD on T/Th/Sat HD as outpt, now approx M/W/F inpatient. HD tolerating until 3/23. Briefly required extra Monday runs, not since being off TPN. She would prefer longer sessions to more days.  - Midodrine 10mg BID PRN with dialysis days   - Currently holding Sevelamer but may need to resume in the coming days per Nephrology   - CBC and CMP daily  - Strict I/Os  - Daily weight   - Renally adjust medications      Endocrine:  # Hyperglycemia, stress induced  Has been euglycemic for the past few days. Not on insulin.  - Hypoglycemia protocol     # Hypothyroidism  Patient with new (2/17/24) low T4 at 0.64 and TSH at 6.5, concerning for new hypothyroidism vs sick thyroid syndrome. TSH with appropriate response, more consistent with elevation in the setting of acute illness. ICU team on 2/28 recommended initiation of treatment for possible hypothyroid as this can improve respiratory muscle function in the setting of weakness and malnutrition. 3/7, 3/15, 3/20 repeat thyroid function WNL.  - Continue liothyronine + levothyroxine -- note that prolonged combination therapy is not optimal & regimen should be re-evaluated (likely stop liothyronine, up-titrate levothyroxine) in post-acute  setting      ID:  # Recurrent pneumonia, concern for acute infection vs rejection  # Recurrent C.Diff colitis (3rd ep 3/12/24)  # Hx EBV viremia   # Hypogammaglobulinemia  # Chronic immunosuppression / lung transplant  Empirically treated for pneumonia  - , RVP and cultures no growth to date. Recurrent C.Diff Positive 3/12, s/p PO Fidaxomicin 200 mg BID for 10 days (3/13-3/22) with improvement in diarrhea. Remains afebrile, leukocytosis resolved.  Ig, s/p IVIG.  EBV 27K (decreased compared to prior).     - Follow up BAL and blood cultures from 3/24    Antibiotics:  Zosyn ( - , 3/24 - )   Bactrim (MWF, PJP ppx, previously on Dapsone)  Vancomycin ( - , 3/24-3/25)  Micafungin (- , 3/24 x1)  Fidaxomicin (3/13-3/22)    Micro:   - BAL 3/24:   - Cell count w diff: PMN 75%, lymphocytes 5, monocytes 19, eos 1  - No organisms seen on Gram stain  - RVP, HSV, Histoplasma neg  - Fungal & bacterial cultures NGTD  - EBV, CMV, PJP, Aspergillus, Legionella, Mycoplasma, AFB in process  - Bcx 3/24 NGTD    Hematology:    #Acute on chronic anemia 2/2 ESRD  Hgb stable, with no acute signs of bleeding.   - Daily CBC  - Transfuse for Hgb < 7  - Continue Epogen with dialysis, held in setting of concern for acute infection   - Continue Venofer 50 mcg qweek with dialysis, held in setting of concern for acute infection     #Steal physiology of LUE dialysis access fistula  LUE arterial US obtained  with concern for LUE edema. US of fistula demonstrated steal physiology. Discussed with nephrology, and vascular surgery consulted on . Vascular surgery has only minor concerns for steal symptoms and given that the AVF is working well, they are okay with outpatient fistulograms/ venoplasty with wrist brachial index and PPG's, unless new concerns arise.  - Plan for outpatient workup as above; nephrology in agreement with outpatient workup.    Musculoskeletal:  # Right hip fracture s/p ORIF  (December 2023)   - PT/OT to eval and treat      Skin:  # Left upper extremity unilateral edema, improving   # Bilateral pedal and ankle edema, improving  Edema due to third spacing due to hypoalbuminemia vs HF. BNP >67654 at admission, Echo on 2/18 shows EF of 55-60% with collapsible IVC. Extremity edema 2/2 to hypoalbuminemia. Upper limb duplex USG on 2/18 negative for DVT.  USG left arm on 2/21 shows steal physiology and Vascular Surgery consulted (see Heme section). Overall edema in extremities have improved significantly with dialysis.    - Elevation and wrapping of only lower legs as needed and increased UF per nephrology for volume management; no wrapping of left upper extremity with dialysis fistula      General Cares/Prophylaxis:    DVT Prophylaxis: Apixaban  GI Prophylaxis: PPI  Restraints: N/A  Family Communication: Daughters Charity & Julia  Code Status: FULL      Lines/tubes/drains:  - PEG/J  - PICC line in RUE   - PIV x1    Disposition:  - Medical ICU      Patient seen and findings/plan discussed with medical ICU staff, Dr. MINA Jorge, APRN CNP     Clinically Significant Risk Factors              # Hypoalbuminemia: Lowest albumin = 2.6 g/dL at 2/18/2024  5:13 AM, will monitor as appropriate  # Coagulation Defect: INR = 1.26 (Ref range: 0.85 - 1.15) and/or PTT = N/A, will monitor for bleeding            # Severe Malnutrition: based on nutrition assessment      # Financial/Environmental Concerns: none            ====================================  INTERVAL HISTORY:   Extubated yesterday afternoon, doing well this am. Wore bipap overnight, hemodynamically stable.    OBJECTIVE:   1. VITAL SIGNS:   Temp:  [97.4  F (36.3  C)-98.5  F (36.9  C)] 98  F (36.7  C)  Pulse:  [60-83] 63  Resp:  [13-23] 21  BP: (103-156)/(42-78) 155/61  FiO2 (%):  [30 %-40 %] 30 %  SpO2:  [75 %-100 %] 94 %  Vent Mode: PS  (Pressure Support)  FiO2 (%): 30 %  PEEP (cm H2O): 5 cmH2O  Pressure Support (cm H2O): 5  cmH2O  Resp: 21  PIP 25    2. INTAKE/ OUTPUT:   I/O last 3 completed shifts:  In: 1556 [I.V.:165; Other:91; NG/GT:530]  Out: 3150 [Other:3000; Stool:150]  Net -2106 past 24h    3. PHYSICAL EXAMINATION:  General: Awake, alert & oriented.  HEENT: Mucous membranes moist  Neuro: Awake and alert, no focal deficits, moving all extremities to command and spontaneously  Pulm/Resp: Clear breath sounds bilaterally, diminished on R>L, no accessory muscle use  CV: RRR, S1/S2 without m/r/g; pedal pulses 2+ without LE edema  Abdomen: Soft, non-distended, non-tender  : Rectal tube in place  Incisions/Skin: PICC and PEG site without surrounding erythema, no rashes noted    4. LABS:   Venous Blood Gas  Recent Labs   Lab 04/03/24  1126 04/03/24  0351 04/02/24  2304 04/02/24  1417   PHV 7.32 7.31* 7.33 7.36   PCO2V 61* 66* 61* 59*   PO2V 38 43 45 45   HCO3V 32* 33* 32* 33*   LINDY 4.3* 5.1* 5.0* 6.6*   O2PER 21 30 36 40     Complete Blood Count   Recent Labs   Lab 04/03/24  0351 04/02/24  0306 04/01/24  0515 03/31/24  0456   WBC 6.4 5.1 4.1 4.7   HGB 9.5* 8.8* 8.7* 8.8*    213 211 204     Basic Metabolic Panel  Recent Labs   Lab 04/03/24  0351 04/02/24  2305 04/02/24  0306 04/01/24  0750 04/01/24  0515 03/31/24  0456     --  137  --  138 140   POTASSIUM 3.6  --  3.6  --  3.5 3.5   CHLORIDE 96*  --  94*  --  96* 99   CO2 29  --  26  --  27 29   BUN 29.6*  --  62.5*  --  47.7* 27.4*   CR 2.56*  --  4.38*  --  3.47* 2.20*   * 107* 125* 89 126* 130*     Liver Function Tests  Recent Labs   Lab 04/03/24  0351 04/02/24  0306 04/01/24  0515 03/31/24  0456   AST 15 13 14 17   ALT 13 10 14 13   ALKPHOS 88 80 80 81   BILITOTAL 0.2 0.2 0.2 0.3   ALBUMIN 3.6 3.4* 3.5 3.4*   INR 1.26* 1.35* 1.29* 1.27*     Coagulation Profile  Recent Labs   Lab 04/03/24  0351 04/02/24  0306 04/01/24  0515 03/31/24  0456   INR 1.26* 1.35* 1.29* 1.27*       5. RADIOLOGY:   No results found for this or any previous visit (from the past 24  hour(s)).

## 2024-04-04 ENCOUNTER — APPOINTMENT (OUTPATIENT)
Dept: OCCUPATIONAL THERAPY | Facility: CLINIC | Age: 64
DRG: 640 | End: 2024-04-04
Attending: INTERNAL MEDICINE
Payer: MEDICARE

## 2024-04-04 LAB
ADV 40+41 DNA STL QL NAA+NON-PROBE: NEGATIVE
ALBUMIN SERPL BCG-MCNC: 3.8 G/DL (ref 3.5–5.2)
ALP SERPL-CCNC: 114 U/L (ref 40–150)
ALT SERPL W P-5'-P-CCNC: 14 U/L (ref 0–50)
ANION GAP SERPL CALCULATED.3IONS-SCNC: 14 MMOL/L (ref 7–15)
AST SERPL W P-5'-P-CCNC: 17 U/L (ref 0–45)
ASTRO TYP 1-8 RNA STL QL NAA+NON-PROBE: NEGATIVE
BASE EXCESS BLDV CALC-SCNC: 0.8 MMOL/L (ref -3–3)
BASOPHILS # BLD AUTO: ABNORMAL 10*3/UL
BASOPHILS # BLD MANUAL: 0.1 10E3/UL (ref 0–0.2)
BASOPHILS NFR BLD AUTO: ABNORMAL %
BASOPHILS NFR BLD MANUAL: 2 %
BILIRUB SERPL-MCNC: 0.2 MG/DL
BUN SERPL-MCNC: 46.2 MG/DL (ref 8–23)
C CAYETANENSIS DNA STL QL NAA+NON-PROBE: NEGATIVE
C DIFF TOX B STL QL: NEGATIVE
CALCIUM SERPL-MCNC: 9.3 MG/DL (ref 8.8–10.2)
CAMPYLOBACTER DNA SPEC NAA+PROBE: NEGATIVE
CHLORIDE SERPL-SCNC: 100 MMOL/L (ref 98–107)
CREAT SERPL-MCNC: 3.97 MG/DL (ref 0.51–0.95)
CRYPTOSP DNA STL QL NAA+NON-PROBE: NEGATIVE
DEPRECATED HCO3 PLAS-SCNC: 25 MMOL/L (ref 22–29)
E COLI O157 DNA STL QL NAA+NON-PROBE: NORMAL
E HISTOLYT DNA STL QL NAA+NON-PROBE: NEGATIVE
EAEC ASTA GENE ISLT QL NAA+PROBE: NEGATIVE
EC STX1+STX2 GENES STL QL NAA+NON-PROBE: NEGATIVE
EGFRCR SERPLBLD CKD-EPI 2021: 12 ML/MIN/1.73M2
EOSINOPHIL # BLD AUTO: ABNORMAL 10*3/UL
EOSINOPHIL # BLD MANUAL: 0.7 10E3/UL (ref 0–0.7)
EOSINOPHIL NFR BLD AUTO: ABNORMAL %
EOSINOPHIL NFR BLD MANUAL: 10 %
EPEC EAE GENE STL QL NAA+NON-PROBE: NEGATIVE
ERYTHROCYTE [DISTWIDTH] IN BLOOD BY AUTOMATED COUNT: 16.4 % (ref 10–15)
ETEC LTA+ST1A+ST1B TOX ST NAA+NON-PROBE: NEGATIVE
G LAMBLIA DNA STL QL NAA+NON-PROBE: NEGATIVE
GLUCOSE SERPL-MCNC: 100 MG/DL (ref 70–99)
HCO3 BLDV-SCNC: 29 MMOL/L (ref 21–28)
HCT VFR BLD AUTO: 32.7 % (ref 35–47)
HGB BLD-MCNC: 10.1 G/DL (ref 11.7–15.7)
IMM GRANULOCYTES # BLD: ABNORMAL 10*3/UL
IMM GRANULOCYTES NFR BLD: ABNORMAL %
INR PPP: 1.17 (ref 0.85–1.15)
LYMPHOCYTES # BLD AUTO: ABNORMAL 10*3/UL
LYMPHOCYTES # BLD MANUAL: 1.6 10E3/UL (ref 0.8–5.3)
LYMPHOCYTES NFR BLD AUTO: ABNORMAL %
LYMPHOCYTES NFR BLD MANUAL: 22 %
MAGNESIUM SERPL-MCNC: 2.2 MG/DL (ref 1.7–2.3)
MCH RBC QN AUTO: 33.9 PG (ref 26.5–33)
MCHC RBC AUTO-ENTMCNC: 30.9 G/DL (ref 31.5–36.5)
MCV RBC AUTO: 110 FL (ref 78–100)
MONOCYTES # BLD AUTO: ABNORMAL 10*3/UL
MONOCYTES # BLD MANUAL: 0.3 10E3/UL (ref 0–1.3)
MONOCYTES NFR BLD AUTO: ABNORMAL %
MONOCYTES NFR BLD MANUAL: 4 %
NEUTROPHILS # BLD AUTO: ABNORMAL 10*3/UL
NEUTROPHILS # BLD MANUAL: 4.5 10E3/UL (ref 1.6–8.3)
NEUTROPHILS NFR BLD AUTO: ABNORMAL %
NEUTROPHILS NFR BLD MANUAL: 62 %
NOROVIRUS GI+II RNA STL QL NAA+NON-PROBE: NEGATIVE
NRBC # BLD AUTO: 0 10E3/UL
NRBC BLD AUTO-RTO: 0 /100
O2/TOTAL GAS SETTING VFR VENT: 30 %
OXYHGB MFR BLDV: 75 % (ref 70–75)
P SHIGELLOIDES DNA STL QL NAA+NON-PROBE: NEGATIVE
PCO2 BLDV: 64 MM HG (ref 40–50)
PH BLDV: 7.26 [PH] (ref 7.32–7.43)
PHOSPHATE SERPL-MCNC: 4.8 MG/DL (ref 2.5–4.5)
PLAT MORPH BLD: ABNORMAL
PLATELET # BLD AUTO: 334 10E3/UL (ref 150–450)
PO2 BLDV: 46 MM HG (ref 25–47)
POLYCHROMASIA BLD QL SMEAR: SLIGHT
POTASSIUM SERPL-SCNC: 4 MMOL/L (ref 3.4–5.3)
PROT SERPL-MCNC: 6.1 G/DL (ref 6.4–8.3)
RBC # BLD AUTO: 2.98 10E6/UL (ref 3.8–5.2)
RBC MORPH BLD: ABNORMAL
RVA RNA STL QL NAA+NON-PROBE: NEGATIVE
SALMONELLA SP RPOD STL QL NAA+PROBE: NEGATIVE
SAO2 % BLDV: 76.2 % (ref 70–75)
SAPO I+II+IV+V RNA STL QL NAA+NON-PROBE: NEGATIVE
SHIGELLA SP+EIEC IPAH ST NAA+NON-PROBE: NEGATIVE
SODIUM SERPL-SCNC: 139 MMOL/L (ref 135–145)
V CHOLERAE DNA SPEC QL NAA+PROBE: NEGATIVE
VIBRIO DNA SPEC NAA+PROBE: NEGATIVE
WBC # BLD AUTO: 7.2 10E3/UL (ref 4–11)
Y ENTEROCOL DNA STL QL NAA+PROBE: NEGATIVE

## 2024-04-04 PROCEDURE — 82805 BLOOD GASES W/O2 SATURATION: CPT

## 2024-04-04 PROCEDURE — 250N000013 HC RX MED GY IP 250 OP 250 PS 637

## 2024-04-04 PROCEDURE — 250N000013 HC RX MED GY IP 250 OP 250 PS 637: Performed by: INTERNAL MEDICINE

## 2024-04-04 PROCEDURE — 99207 PR NO BILLABLE SERVICE THIS VISIT: CPT | Performed by: INTERNAL MEDICINE

## 2024-04-04 PROCEDURE — 250N000011 HC RX IP 250 OP 636

## 2024-04-04 PROCEDURE — 85610 PROTHROMBIN TIME: CPT | Performed by: STUDENT IN AN ORGANIZED HEALTH CARE EDUCATION/TRAINING PROGRAM

## 2024-04-04 PROCEDURE — 99233 SBSQ HOSP IP/OBS HIGH 50: CPT | Performed by: NURSE PRACTITIONER

## 2024-04-04 PROCEDURE — 87493 C DIFF AMPLIFIED PROBE: CPT

## 2024-04-04 PROCEDURE — 80053 COMPREHEN METABOLIC PANEL: CPT

## 2024-04-04 PROCEDURE — 634N000001 HC RX 634: Mod: JZ | Performed by: INTERNAL MEDICINE

## 2024-04-04 PROCEDURE — 200N000002 HC R&B ICU UMMC

## 2024-04-04 PROCEDURE — 97535 SELF CARE MNGMENT TRAINING: CPT | Mod: GO | Performed by: OCCUPATIONAL THERAPIST

## 2024-04-04 PROCEDURE — 250N000012 HC RX MED GY IP 250 OP 636 PS 637

## 2024-04-04 PROCEDURE — 250N000012 HC RX MED GY IP 250 OP 636 PS 637: Performed by: NURSE PRACTITIONER

## 2024-04-04 PROCEDURE — 99233 SBSQ HOSP IP/OBS HIGH 50: CPT | Mod: GC | Performed by: HOSPITALIST

## 2024-04-04 PROCEDURE — 83735 ASSAY OF MAGNESIUM: CPT | Performed by: STUDENT IN AN ORGANIZED HEALTH CARE EDUCATION/TRAINING PROGRAM

## 2024-04-04 PROCEDURE — 94640 AIRWAY INHALATION TREATMENT: CPT

## 2024-04-04 PROCEDURE — 258N000003 HC RX IP 258 OP 636: Performed by: INTERNAL MEDICINE

## 2024-04-04 PROCEDURE — 97110 THERAPEUTIC EXERCISES: CPT | Mod: GO | Performed by: OCCUPATIONAL THERAPIST

## 2024-04-04 PROCEDURE — 85027 COMPLETE CBC AUTOMATED: CPT

## 2024-04-04 PROCEDURE — 99418 PROLNG IP/OBS E/M EA 15 MIN: CPT | Performed by: DIETITIAN, REGISTERED

## 2024-04-04 PROCEDURE — 999N000157 HC STATISTIC RCP TIME EA 10 MIN

## 2024-04-04 PROCEDURE — 99233 SBSQ HOSP IP/OBS HIGH 50: CPT | Mod: FS | Performed by: PHYSICIAN ASSISTANT

## 2024-04-04 PROCEDURE — 99233 SBSQ HOSP IP/OBS HIGH 50: CPT | Performed by: DIETITIAN, REGISTERED

## 2024-04-04 PROCEDURE — 94799 UNLISTED PULMONARY SVC/PX: CPT

## 2024-04-04 PROCEDURE — 90937 HEMODIALYSIS REPEATED EVAL: CPT

## 2024-04-04 PROCEDURE — 99233 SBSQ HOSP IP/OBS HIGH 50: CPT | Mod: FS | Performed by: INTERNAL MEDICINE

## 2024-04-04 PROCEDURE — 250N000009 HC RX 250

## 2024-04-04 PROCEDURE — 99207 PR NO BILLABLE SERVICE THIS VISIT: CPT | Performed by: PHYSICIAN ASSISTANT

## 2024-04-04 PROCEDURE — 250N000013 HC RX MED GY IP 250 OP 250 PS 637: Performed by: STUDENT IN AN ORGANIZED HEALTH CARE EDUCATION/TRAINING PROGRAM

## 2024-04-04 PROCEDURE — 94660 CPAP INITIATION&MGMT: CPT

## 2024-04-04 PROCEDURE — 87507 IADNA-DNA/RNA PROBE TQ 12-25: CPT | Performed by: NURSE PRACTITIONER

## 2024-04-04 PROCEDURE — 85007 BL SMEAR W/DIFF WBC COUNT: CPT

## 2024-04-04 PROCEDURE — 84100 ASSAY OF PHOSPHORUS: CPT | Performed by: STUDENT IN AN ORGANIZED HEALTH CARE EDUCATION/TRAINING PROGRAM

## 2024-04-04 RX ORDER — LEVALBUTEROL INHALATION SOLUTION 1.25 MG/3ML
1.25 SOLUTION RESPIRATORY (INHALATION) 4 TIMES DAILY PRN
Status: DISCONTINUED | OUTPATIENT
Start: 2024-04-04 | End: 2024-04-15 | Stop reason: HOSPADM

## 2024-04-04 RX ADMIN — OLANZAPINE 5 MG: 5 TABLET, ORALLY DISINTEGRATING ORAL at 22:34

## 2024-04-04 RX ADMIN — CYCLOSPORINE 100 MG: 100 SOLUTION ORAL at 08:27

## 2024-04-04 RX ADMIN — Medication 2.5 MCG: at 20:10

## 2024-04-04 RX ADMIN — APIXABAN 2.5 MG: 2.5 TABLET, FILM COATED ORAL at 08:25

## 2024-04-04 RX ADMIN — METOPROLOL TARTRATE 25 MG: 25 TABLET, FILM COATED ORAL at 19:45

## 2024-04-04 RX ADMIN — CALCIUM CARBONATE 600 MG (1,500 MG)-VITAMIN D3 400 UNIT TABLET 1 TABLET: at 11:10

## 2024-04-04 RX ADMIN — SULFAMETHOXAZOLE AND TRIMETHOPRIM 1 TABLET: 400; 80 TABLET ORAL at 20:10

## 2024-04-04 RX ADMIN — Medication 40 MG: at 19:45

## 2024-04-04 RX ADMIN — HYDRALAZINE HYDROCHLORIDE 20 MG: 20 INJECTION INTRAMUSCULAR; INTRAVENOUS at 14:27

## 2024-04-04 RX ADMIN — SODIUM CHLORIDE 250 ML: 9 INJECTION, SOLUTION INTRAVENOUS at 09:06

## 2024-04-04 RX ADMIN — PREDNISONE 5 MG: 5 TABLET ORAL at 08:25

## 2024-04-04 RX ADMIN — CALCIUM CARBONATE 600 MG (1,500 MG)-VITAMIN D3 400 UNIT TABLET 1 TABLET: at 18:15

## 2024-04-04 RX ADMIN — LEVOTHYROXINE SODIUM 25 MCG: 0.03 TABLET ORAL at 06:13

## 2024-04-04 RX ADMIN — LEVALBUTEROL HYDROCHLORIDE 1.25 MG: 1.25 SOLUTION RESPIRATORY (INHALATION) at 09:04

## 2024-04-04 RX ADMIN — ACETYLCYSTEINE 2 ML: 100 SOLUTION ORAL; RESPIRATORY (INHALATION) at 09:04

## 2024-04-04 RX ADMIN — Medication 40 MG: at 08:25

## 2024-04-04 RX ADMIN — CALCIUM CARBONATE 600 MG (1,500 MG)-VITAMIN D3 400 UNIT TABLET 1 TABLET: at 08:27

## 2024-04-04 RX ADMIN — AMIODARONE HYDROCHLORIDE 200 MG: 200 TABLET ORAL at 08:25

## 2024-04-04 RX ADMIN — EPOETIN ALFA-EPBX 8000 UNITS: 10000 INJECTION, SOLUTION INTRAVENOUS; SUBCUTANEOUS at 10:26

## 2024-04-04 RX ADMIN — SEVELAMER CARBONATE 0.8 G: 800 POWDER, FOR SUSPENSION ORAL at 08:30

## 2024-04-04 RX ADMIN — SEVELAMER CARBONATE 0.8 G: 800 POWDER, FOR SUSPENSION ORAL at 20:11

## 2024-04-04 RX ADMIN — LIDOCAINE AND PRILOCAINE: 25; 25 CREAM TOPICAL at 06:46

## 2024-04-04 RX ADMIN — SODIUM CHLORIDE 300 ML: 9 INJECTION, SOLUTION INTRAVENOUS at 09:06

## 2024-04-04 RX ADMIN — ONDANSETRON 4 MG: 4 TABLET, ORALLY DISINTEGRATING ORAL at 22:34

## 2024-04-04 RX ADMIN — Medication 2.5 MCG: at 08:27

## 2024-04-04 RX ADMIN — LIDOCAINE 4% 1 PATCH: 40 PATCH TOPICAL at 19:44

## 2024-04-04 RX ADMIN — CYCLOSPORINE 100 MG: 100 SOLUTION ORAL at 18:15

## 2024-04-04 RX ADMIN — CLONIDINE HYDROCHLORIDE 0.1 MG: 0.1 TABLET ORAL at 22:35

## 2024-04-04 ASSESSMENT — ACTIVITIES OF DAILY LIVING (ADL)
ADLS_ACUITY_SCORE: 39
ADLS_ACUITY_SCORE: 33
ADLS_ACUITY_SCORE: 39
ADLS_ACUITY_SCORE: 33
ADLS_ACUITY_SCORE: 39

## 2024-04-04 NOTE — PROGRESS NOTES
MEDICAL ICU PROGRESS NOTE  04/04/2024      Date of Service (when I saw the patient): 04/04/2024    ASSESSMENT: Sofie Rodriguez is a 63 year old female with PMH COPD s/p bilateral lung transplant 6/28/22 c/b hemidiaphragm palsy and recurrent pneumonias, gastroparesis and small bowel dysmotility complicated by severe malnutrition now s/p PEG/J, ESRD on M/W/F HD, recent R femoral fx s/p ORIF, chronic diarrhea, recurrent c-diff, failure to thrive with inability to tolerate any tube feeds, who was admitted to [facility] on 2/10/24 for concerns over malnutrition and TPN initiation via portacath. Course complicated by recurrent and progressive acute on chronic hypoxic and hypercarbic respiratory failure requiring multiple ICU admissions and intubation 2/18-2/19, 2/28-2/29, 3/18-3/20, for continuous BiPAP. Again with worsening respiratory acidosis and hypercarbia, concern for infection vs acute rejection, requiring transfer back to ICU 3/20/2024 for intubation and bronchoscopy.    CHANGES and MAJOR THINGS TODAY:     - Continue strict adherence to BiPAP at least 7-8 hours at night and PRN with naps   - Stop mucomyst and change xopenex to PRN --> resume if pt develops difficulty clearing secretions  - Discuss optimizing bicarb with renal  - GI consult given worsening of diarrhea  - Rule out C diff, start loperamide if negative  - Transfer to medicine with intermediate care to ensure BiPAP compliance     PLAN:     Neuro:  # Acute pain  - Tylenol Q4 prn  - Lidocaine patch prn  - Heating pads prn    # Hx of Anxiety   # Claustrophobia  Reports claustrophobia contributing to limited compliance with BiPAP. Has seen health psych on 3/20, recommended grounding exercise (5-4-3-2-1) for use on BiPAP.   - Zyprexa 5mg at bedtime   - Atarax 25 mg TID PRN for anxiety  - Clonidine 0.1 mg at bedtime     # B/L Neuropathic Pain, resolved   New pain b/l this hospitalization. Last A1c <4.2 (7/11/23). Consider possibly 2/2 ESRD. TSH wnl.  B12 and B1 elevated, B6 normal. Copper low (56.3), zinc mildly low (48.3).   - Neuro Recs:  - No obvious clinical history or exam findings to suggest neurologic/neuromuscular causes of respiratory weakness  - Holding B12 supplement (supra-therapeutic 3/15)    Pulmonary:  # Acute on chronic hypoxic and hypercarbic respiratory failure requiring intubation 2/17-2/19 3/25-4/2, improved  # Recurrent PNAs, concern for acute infection vs acute rejection  # S/p BSLT 6/8/22 for COPD  # R hemidiaphragm palsy   # Positive DSA   # Suspected CARLEE  # PTA nocturnal O2, 2L   S/p bilateral lung transplant 6/28/22 for COPD. Was admitted 2/10 to address malnutrition and admitted to ICU on 2/17 with hypoxic hypercarbic respiratory failure. Intubated on 2/18 due to worsening oxygen requirements, likely due to pulmonary edema given hx of ESRD requiring HD vs infection given hx of lung transplant, immunosuppressed status, and recurrent pneumonias. Required intubation 2/17 - 2/19. Ongoing respiratory acidosis despite BiPAP/AVAPS, claustrophobic while using to intermittent compliance. Neurology consulted and MRI r/o central cause problem for respiratory drive. Started on AVAPS with improvement in mental status and VBG, and patient transferred back to medicine floor on 2/29. 3/08 SNIFF with no definite paradoxical movement of bilateral hemidiaphragm. Transferred back to ICU 3/18-3/20 d/t hypercarbia, severe respiratory acidosis, but did not require intubation during this time. Issues with anxiety/claustrophobia while using the mask, Atarax has helped though Zyprexa led to hallucinations. Even with pH this low, and pCO2 that high, mentation remains stable. CT chest 3/24 w/ scattered opacity throughout all lungs; dilated pulm artery. On BiPAP 16/5 at time of transfer. Transplant pulm concerned for infectious vs acute rejection picture, recommending intubation + bronchoscopy, have been following since. Worry there may be an idiopathic picture  causing decreased respiratory drive. Volume overload + pulmonary edema complicating picture, as patient has had low pressures limiting HD runs.   - Daily VBG  - Transplant pulm following, appreciate recs    - Immunosuppression:   >Prednisone 5 mg every morning, 2.5 mg every afternoon  >Cyclosporine 100mg BID (Stopped tacrolimus 3/10)   >Overnight oximetry study suggestive of O2 vs CPAP need, as did require up to 2LPM overnight to prevent hypoxia  >Try to minimize O2 to preserve respiratory drive, will give IVIG for DSA+   >D/c'd theophylline 3/3/24 due to difficulty attaining therapeutic levels (started by ICU), could consider Modafinil   >Repeat metabolic CART study ordered by Transplant Pulm   - Airway clearance/nebs:  --> resume BID scheduled if secretions worsen/thicken  - Xopenex neb BID PRN  - Mucomyst stopped 4/4   - Volume removal with iHD  - Sleep clinic eval at discharge for suspected CARLEE  - Limit medications that would depress respiration  - BIPAP at hs and naps at all times. Ideally 7-8 hours overnight  - Daily VBG   - High risk for reintubation given hx of hypercarbia   - Will need bipap machine for home prior to discharge    # Goals of Care  - 3/15 Care conference on with Transplant Pulm, Pall Care, Medicine, daughters (Julia Nash) and Transplant SW. Overall medical updates provided and Qs answered. With declining respiratory acidosis on labs, discussed code status 3/18. Patient initially not desiring any escalation of her care to ICU/intubation with frustration re overall course. However, after discussing with her daughter on the phone she and family elected to remain full code and agreed to ICU admission and intubation if necessary.   - 3/29 Care conference: Laid out a few options for family and patient to consider with qs answered. Examples being we could attempt to extubate to BiPAP but plan should be established prior to extubation that if patient decompensates and requires reintubation then  likely pursue trach versus more comfort approach. Other option is going straight for trach now. Patient and family (daughter Julia and son present on Ipad) considering the options and had a lot of questions about trach and what that would look like long-term, which was laid out for the family and patient.   - Pt made the decision this hospitalization, that if needed down the line she would be acceptable of a tracheostomy     Cardiovascular:  # A-flutter (recurrent on 3/17)  # A-fib, intermittent  # HTN  # HFpEF  Noted Afib/flutter intermittently throughout admission. Was started on amiodarone bolus with drip and transitioned to PO dosing.  2/17 TTE: LVEF 55-60%, global RV function normal, no significant valvular abnormalities. Briefly required levophed and midodrine for HD but otherwise stable off pressors.   - MAP goal > 65 mmHg   - Continue Metoprolol tartrate dose 25 mg BID (hold parameters if MAP<65 or hr less than 60, hold on dialysis days)  - Continue amiodarone 200 mg PO  - Midodrine with HD as needed      GI/Nutrition:  # Severe malnutrition   # Failure to thrive  # Hypoalbuminemia  # Gastroparesis, small bowel dysmotility  # S/P PEG/J with intolerance of enteral nutrition  # Chronic osmotic diarrhea/SIBO s/p Rifaximin  # Recurrent C.Diff (3rd ep 3/12/24)    # GERD  Patient with gastroparesis (presumed due to vagal injury) and small bowel dysmotility complicated by unintentional 40lb weight loss over the past year and now severe malnutrition. Previously intolerant to oral food intake due to nausea. Was initially admitted for portacath and TPN initiation since 2/13. Intermittently tolerating feeds without n/v from 2/20.  Per GI, no ongoing concerns for SIBO as of 2/23. As of 3/11 transplant pulmonology is worried that TPN is not a realistic plan to continue at home and would like to transition back to TF (RD oncerned pt is not absorbing any oral intake). TPN discontinued 3/16. Transplant pulm also worried  about mucosal toxicity. Recurrent C.diff 3/12, Fidaxomicin x 10 days (3/13-3/22), with improvement in diarrhea. Transplant pulm requesting repeat colonoscopy w/ Bx after completion of c.diff treatment, to r/o toxicity or other reason that diarrhea is present.     4/4/24: Diarrhea worsened over past 24 hours. No leukocytosis. Reporting mild suprapubic pain, but no other abdo pain. Will check cdiff, re-consult GI, and start loperamide if C diff NEG.     - GI consult for recurrent diarrhea, appreciate recs (? colonoscopy +/- biopsy)  - Check C diff, schedule loperamide if negative  - Metabolic cart 4/5/24 following 24 hours of calorie counts   - Confirmed Osmotic diarrhea with stool studies--> Likely not infectious, continue adjusting TF and loperamide PRN  - Nutrition consulted, appreciate assistance  - Continue TF at goal rate 35 ml/hr via PEG/J  - Speech saw pt 4/3 okay for regular diet with thin liquids; will continue pt tube feeds via pts g/j tube (hx of gastroparesis requiring TPN) until pt able to take in adequate PO nutrition          - PPI BID  - Bowel regimen PRN        Renal/Fluids/Electrolytes:  # ESRD on HD  # Mild hyperphosphatemia  Patient is ESRD on T/Th/Sat HD as outpt, now approx M/W/F inpatient. HD tolerating until 3/23. Briefly required extra Monday runs, not since being off TPN. She would prefer longer sessions to more days.  - Midodrine 10mg BID PRN with dialysis days   - Discuss with nephrology if there are additional strategies to optimize bicarb   - Sevelamer per Nephrology   - CBC and CMP daily  - Strict I/Os  - Daily weight   - Renally adjust medications      Endocrine:  # Hyperglycemia, stress induced  Has been euglycemic for the past few days. Not on insulin.  - Hypoglycemia protocol     # Hypothyroidism  Patient with new (2/17/24) low T4 at 0.64 and TSH at 6.5, concerning for new hypothyroidism vs sick thyroid syndrome. TSH with appropriate response, more consistent with elevation in the  setting of acute illness. ICU team on  recommended initiation of treatment for possible hypothyroid as this can improve respiratory muscle function in the setting of weakness and malnutrition. 3/7, 3/15, 3/20 repeat thyroid function WNL.  - Continue liothyronine + levothyroxine -- note that prolonged combination therapy is not optimal & regimen should be re-evaluated (likely stop liothyronine, up-titrate levothyroxine) in post-acute setting      ID:  # Recurrent pneumonia, concern for acute infection vs rejection  # Recurrent C.Diff colitis (3rd ep 3/12/24)  # Hx EBV viremia   # Hypogammaglobulinemia  # Chronic immunosuppression  lung transplant  Empirically treated for pneumonia  - , RVP and cultures no growth to date. Recurrent C.Diff Positive 3/12, s/p PO Fidaxomicin 200 mg BID for 10 days (3/13-3/22) with improvement in diarrhea. Remains afebrile, leukocytosis resolved.  Ig, s/p IVIG.  EBV 27K (decreased compared to prior).     - Follow up BAL and blood cultures from 3/24    Antibiotics:  Zosyn ( - , 3/24 - )   Bactrim (MWF, PJP ppx, previously on Dapsone)  Vancomycin ( - , 3/24-3/25)  Micafungin (- , 3/24 x1)  Fidaxomicin (3/13-3/22)    Micro:   - BAL 3/24:   - Cell count w diff: PMN 75%, lymphocytes 5, monocytes 19, eos 1  - No organisms seen on Gram stain  - RVP, HSV, Histoplasma neg  - Fungal & bacterial cultures NGTD  - EBV, CMV, PJP, Aspergillus, Legionella, Mycoplasma, AFB in process  - Bcx 3/24 NGTD    Hematology:    #Acute on chronic anemia 2/2 ESRD  Hgb stable, with no acute signs of bleeding.   - Daily CBC  - Transfuse for Hgb < 7  - Continue Epogen with dialysis, held in setting of concern for acute infection   - Continue Venofer 50 mcg qweek with dialysis, held in setting of concern for acute infection     #Steal physiology of LUE dialysis access fistula  LUE arterial US obtained  with concern for LUE edema. US of fistula demonstrated steal  physiology. Discussed with nephrology, and vascular surgery consulted on 2/22. Vascular surgery has only minor concerns for steal symptoms and given that the AVF is working well, they are okay with outpatient fistulograms/ venoplasty with wrist brachial index and PPG's, unless new concerns arise.  - Plan for outpatient workup as above; nephrology in agreement with outpatient workup.    Musculoskeletal:  # Right hip fracture s/p ORIF (December 2023)   - PT/OT to eval and treat      Skin:  # Left upper extremity unilateral edema, improving   # Bilateral pedal and ankle edema, improving  Edema due to third spacing due to hypoalbuminemia vs HF. BNP >81839 at admission, Echo on 2/18 shows EF of 55-60% with collapsible IVC. Extremity edema 2/2 to hypoalbuminemia. Upper limb duplex USG on 2/18 negative for DVT.  USG left arm on 2/21 shows steal physiology and Vascular Surgery consulted (see Heme section). Overall edema in extremities have improved significantly with dialysis.    - Elevation and wrapping of only lower legs as needed and increased UF per nephrology for volume management; no wrapping of left upper extremity with dialysis fistula      General Cares/Prophylaxis:    DVT Prophylaxis: Apixaban  GI Prophylaxis: PPI  Restraints: N/A  Family Communication: Daughters Charity & Julia  Code Status: FULL      Lines/tubes/drains:  - PEG/J  - PICC line in RUE   - PIV x1    Disposition:  - [place] [facility] [place]      Patient seen and findings/plan discussed with medical ICU staff, Dr. MINA Duncan, JADON   Patient care time 60 minutes     Clinically Significant Risk Factors              # Hypoalbuminemia: Lowest albumin = 2.6 g/dL at 2/18/2024  5:13 AM, will monitor as appropriate  # Coagulation Defect: INR = 1.17 (Ref range: 0.85 - 1.15) and/or PTT = N/A, will monitor for bleeding            # Severe Malnutrition: based on nutrition assessment      # Financial/Environmental Concerns:  none            ====================================  INTERVAL HISTORY:   Increased stool frequency (11 occurrences) of watery diarrhea in past 24 hours. No fevers or hemodynamic instability. Wore BiPAP overnight with stable CO2. Slight worsening of acidosis related to loss of bicarb, likely 2/2 ESRD and diarrhea.     OBJECTIVE:   1. VITAL SIGNS:   Temp:  [97.3  F (36.3  C)-98.8  F (37.1  C)] 97.3  F (36.3  C)  Pulse:  [60-86] 67  Resp:  [14-24] 19  BP: (107-156)/(48-96) 134/58  FiO2 (%):  [30 %] 30 %  SpO2:  [89 %-100 %] 100 %  FiO2 (%): 30 %  Resp: 19  PIP 25    2. INTAKE/ OUTPUT:   I/O last 3 completed shifts:  In: 1567 [P.O.:50; NG/GT:782]  Out: 200 [Stool:200]  Net -2106 past 24h    3. PHYSICAL EXAMINATION:  General: Awake, alert & oriented. Frail appearing  HEENT: Mucous membranes moist  Neuro: Awake and alert, no focal deficits, moving all extremities to command and spontaneously  Pulm/Resp: Clear breath sounds bilaterally, diminished on R>L, no accessory muscle use  CV: RRR, S1/S2 without m/r/g; pedal pulses 2+ without LE edema  Abdomen: Soft, non-distended, non-tender  : Rectal tube in place, (+) bruit/thrill in LUE fistula  Incisions/Skin: PICC and PEG site without surrounding erythema, no rashes noted    4. LABS:   Venous Blood Gas  Recent Labs   Lab 04/04/24  0320 04/03/24  1126 04/03/24  0351 04/02/24  2304   PHV 7.26* 7.32 7.31* 7.33   PCO2V 64* 61* 66* 61*   PO2V 46 38 43 45   HCO3V 29* 32* 33* 32*   LINDY 0.8 4.3* 5.1* 5.0*   O2PER 30 21 30 36     Complete Blood Count   Recent Labs   Lab 04/04/24  0320 04/03/24  0351 04/02/24  0306 04/01/24  0515   WBC 7.2 6.4 5.1 4.1   HGB 10.1* 9.5* 8.8* 8.7*    261 213 211     Basic Metabolic Panel  Recent Labs   Lab 04/04/24  0320 04/03/24  0351 04/02/24  2305 04/02/24  0306 04/01/24  0750 04/01/24  0515    136  --  137  --  138   POTASSIUM 4.0 3.6  --  3.6  --  3.5   CHLORIDE 100 96*  --  94*  --  96*   CO2 25 29  --  26  --  27   BUN 46.2* 29.6*   --  62.5*  --  47.7*   CR 3.97* 2.56*  --  4.38*  --  3.47*   * 119* 107* 125*   < > 126*    < > = values in this interval not displayed.     Liver Function Tests  Recent Labs   Lab 04/04/24 0320 04/03/24  0351 04/02/24  0306 04/01/24  0515   AST 17 15 13 14   ALT 14 13 10 14   ALKPHOS 114 88 80 80   BILITOTAL 0.2 0.2 0.2 0.2   ALBUMIN 3.8 3.6 3.4* 3.5   INR 1.17* 1.26* 1.35* 1.29*     Coagulation Profile  Recent Labs   Lab 04/04/24 0320 04/03/24  0351 04/02/24  0306 04/01/24  0515   INR 1.17* 1.26* 1.35* 1.29*       5. RADIOLOGY:   No results found for this or any previous visit (from the past 24 hour(s)).

## 2024-04-04 NOTE — PLAN OF CARE
ICU End of Shift Summary. See flowsheets for vital signs and detailed assessment.    Changes this shift: No acute events overnight, Pt VSS on RA while awake and Bipap 14/7 30%. Multiple watery/loose BM's overnight. TF at goal rate.     Plan: HD today.       Goal Outcome Evaluation:      Plan of Care Reviewed With: patient    Overall Patient Progress: improvingOverall Patient Progress: improving    Outcome Evaluation: Bipap for most of night, standby assist, HD today

## 2024-04-04 NOTE — PROGRESS NOTES
Pulmonary Medicine  Cystic Fibrosis - Lung Transplant Team  Progress Note  2024     Patient: Sofie Rodriguez  MRN: [id number]  : 1960 (age 63 year old)  Transplant: 2022 (Lung), POD#[id number]  Admission date: 2/10/2024    Assessment & Plan:     Sofie Rodriguez is a 63 year old female with h/o COPD s/p BSLT () with course complicated by post-operative hemidiaphragm palsy, recurrent PNAs, positive DSA, EBV viremia, hypogammaglobulinemia, severe gastroparesis s/p G/J tube placement (22) and pyloric botox (23), GI bleed 2/2 pyloric ulcer, hemobilia s/p ERCP and MRCP, chronic diarrhea, recurrent C diff colitis, ESRD on iHD, recurrent falls with injuries (recent right hip fx s/p ORIF 2023), deconditioning, and FTT.  Admitted 2/10 for failure to thrive and initiation of TPN/lipids.  Emergent intubation - for hypoxic and hypercapneic respiratory failure and encephalopathy. Returned to ICU - and again 3/18-3/20 d/t tenuous respiratory status complicated by variable daytime NIPPV tolerance and hypervolemia with iHD limited d/t hypotension. Again transferred back to ICU 3/24 for intubation and bronch/BAL given hypoxic and hypercapneic respiratory failure. CT with extensive bilateral infiltrate and etiology likely multifactorial including volume overload and infection, possible mucous plugging, ACR or AMR less likely.  Extubated , no daytime hypoxia and hypercapnia remains stable with good adherence to NIPPV with sleep.      Today's recommendations:  - Nebs and Volera QID stopped today, monitor closely for need to resume nebs (sticky, tenacious secretions)  - Daily VBG, goal for permissive hypercapnea to mid 60's  - Strict adherence to NIPPV overnight and with all naps, should obtain home NIPPV machine and work to transition to long-term device while inpatient  - Recent ImmuKnow further, CSA goal decreased (aggressive dose decrease yesterday despite near therapeutic goal so  defer further dose adjustments deferred today) and repeat level 4/6 (ordered), prednisone dose also decreased  - DSA and EBV due 4/23     Acute on chronic hypoxic/hypercapneic respiratory failure:  S/p bilateral sequential lung transplant for COPD:   Hypoventilation, Suspected CARLEE:  PFTs in clinic remained significantly below her baseline.  Baseline hypoxia with 2L NC overnight PTA.  S/p intubation 2/17-2/19 for acute hypoxic/hypercapneic respiratory failure with encephalopathy, CT with concern for infection and pulmonary edema given recent addition of TPN nutrition.  Bronch (2/18) with very friable tissue, cutlures only with C. glabrata.  Persistent respiratory failure also complicated by hypoventilation and deconditioning d/t malnutrition. Neurology consulted 2/27, MRI brain (3/1) without acute intracranial pathology.  SNIFF (3/8) normal.  Metabolic CART suggested overfeeding on TPN.  Returned to ICU (3/18-3/20) d/t tenuous respiratory status complicated by NIPPV intolerance and hypervolemia with iHD limited d/t hypotension (CXR with increased pulmonary edema).  Overall, her PCO2 retention is likely multifactorial with a component being from overfeeding (though remedied with nutrition adjustment), metabolic compensation barrier d/t renal failure, pulmonary edema, bicarb loss with diarrhea, hypoventilation with declining NIF and FVC concerning for neuromuscular etiology (though Neurology consult was not revealing), and NIPPV intolerance due to claustrophobia. Worsening hypoxic and hypercapneic respiratory failure 3/24 and transferred to ICU for intubation. CT chest with extensive bilateral infiltrates markedly increased from previous.  Bronch with small L>R sided secretions easily suctioned, BAL with no evidence of DAH, non bloody.  S/p Zosyn (3/23-4/2) for 10 day empiric course.  Extubated 4/2, hypercapnia stable with consistent overnight BiPAP use.  - Nebs and Volera QID stopped today, monitor closely for need to  resume nebs (sticky, tenacious secretions)  - Daily VBG, goal for permissive hypercapnea to mid 60's  - Strict adherence to NIPPV overnight and with all naps, should obtain home NIPPV machine and work to transition to long-term device while inpatient     Immunosuppression:  AZA previously stopped 5/2023 d/t low ImmuKnow assay and EBV viremia.  ImmuKnow (2/27) remained low at 88.  Transitioned from [place] to CSA 3/11.  - ImmuKnow (4/1) further decreased to 43  -  BID (decreased 4/3).  Goal 120-140, decreased today d/t ImmuKnow, aggressive dose decreased yesterday despite near therapeutic goal so defer further dose adjustments pending repeat level 4/6 (ordered).  - Prednisone 5 mg qAM / 2.5 mg qPM decreased today to 5 mg daily given ImmuKnow and to better reflect pt's weight (ordered).     Prophylaxis:   - Bactrim qMWF for PJP ppx  - CMV ppx not currently indicated, D+/R+, CMV negative 3/18     Positive DSA: AMR treatment deferred given frailty and stable PFTs.  DSA DQB2 decreased on 3/6 with 5667 mfi, and again decreased to 4960 on 3/23.  Most recent cell-free DNA (2/18) mildly elevated at 1.04 (concerning for possible rejection), which was increased from prior level of 0.12 (1/10).  IST increase deferred at that time per Dr. Gong.  S/p IVIG (2/27) for DSA (IgG WNL).  Immuknow as above.  Prospera (cell free DNA) 0.44 (3/18) suggests AMR less likely, follow  - Repeat DSA monthly (due 4/23)      EBV viremia: CT CAP (2/7) without lymphadenopathy.  Recent levels improving (3/18) 23k.  - Repeat EBV due 4/23     Other relevant problems being managed by the primary team:      FTT:  Severe protein calorie malnutrition:  Gastroparesis s/p PEG/J, botox, and G-POEM:  SB hypomotility:  Pyloric ulcer:  Chronic nausea and osmotic diarrhea:  SIBO s/p rifaximin:   Recurrent C diff colitis: Chronic osmotic and infectious diarrhea since transplant with recurrent episodes of C diff.  Notable weight loss (40# in a year) d/t  diarrhea, GI dysmotility, and intolerance of enteral feeds (PEG/J tube in place), most recently on elemental formula.  Extensive OP eval and f/u with GI. S/p port placement for TPN and lipids. Continued for ~5 weeks inpatient without considerable improvement, transitioned back to TF.  C diff positive (3/13), on fidaxomicin.     ESRD: CT with volume overload secondary to TPN volume, iHD increased from PTA TThSa schedule with unsustained improvement.  Management per Nephrology, dialysis via [facility] CVC.  Unable to remove fluid on 3/23 d/t hypotension, tolerance now improved with midodrine on HD days.  Volume removal and dialysis per nephrology.     Goals of care: Care conference held with palliative and primary team on 3/15 for medical update with pt. and daughters.  Repeat care conference 3/29 (see palliative and MICU notes) patient would like to proceed with re-intubation and trach if pt. fails extubation d/t progressive hypercapnia, understanding that this would severely complicate potential disposition options.     We appreciate the excellent care provided by the MICU team.  Recommendations communicated via this note.  Will continue to follow along closely, please do not hesitate to call with any questions or concerns.     Patient discussed with Dr. Castillo.    Catherine Johnson, DNP, APRN, CNP  Inpatient Nurse Practitioner  Pulmonary CF/Transplant     Subjective & Interval History:     Remains on RA during the day with BiPAP 14/7/30, tolerated for 8 hours overnight for the past 2 nights (since extubation).  Rare cough and sputum.  Tolerating ambulation with some ANAYA from deconditioning.  C/o left ear discomfort (new).  No nausea or reflux but c/o frequent diarrhea with abdominal cramping.    Review of Systems:     C: No fever, no chills, + decreasing weight  INTEGUMENTARY/SKIN: No rash or obvious new lesions  ENT/MOUTH: No nasal congestion or drainage  RESP: See interval history  CV: No chest pain, no palpitations, no  peripheral edema, no orthopnea  GI: No reflux symptoms  : Oliguria  MUSCULOSKELETAL: No myalgias, no arthralgias  ENDOCRINE: Blood sugars with adequate control  NEURO: No headache, no numbness or tingling  PSYCHIATRIC: Mood stable    Physical Exam:     All notes, images, and labs from past 24 hours (at minimum) were reviewed.    Vital signs:  Temp: 97.3  F (36.3  C) Temp src: Oral BP: 134/58 Pulse: 67   Resp: 19 SpO2: 100 % O2 Device: BiPAP/CPAP (14/7) Oxygen Delivery: 4 LPM   Weight: 41 kg (90 lb 4.8 oz)  I/O:   Intake/Output Summary (Last 24 hours) at 4/4/2024 0712  Last data filed at 4/4/2024 0700  Gross per 24 hour   Intake 1517 ml   Output 200 ml   Net 1317 ml     Constitutional: Lying in bed receiving iHD, in no apparent distress.   HEENT: Eyes with pink conjunctivae, anicteric.  Oral mucosa moist without lesions.   PULM: Mildly diminished bases bilaterally.  No crackles, no rhonchi, no wheezes.  Non-labored breathing on RA.  CV: Normal S1 and S2.  RRR.  No murmur, gallop, or rub.  No peripheral edema.   ABD: NABS, soft, nontender, nondistended.    MSK: Moves all extremities.  + muscle wasting.   NEURO: Alert and conversant.   SKIN: Warm, dry, fragile.  Scattered ecchymosis.   PSYCH: Mood stable.     Data:     LABS    CMP:   Recent Labs   Lab 04/04/24  0320 04/03/24  0351 04/02/24  2305 04/02/24  0306 04/01/24  0750 04/01/24  0515    136  --  137  --  138   POTASSIUM 4.0 3.6  --  3.6  --  3.5   CHLORIDE 100 96*  --  94*  --  96*   CO2 25 29  --  26  --  27   ANIONGAP 14 11  --  17*  --  15   * 119* 107* 125*   < > 126*   BUN 46.2* 29.6*  --  62.5*  --  47.7*   CR 3.97* 2.56*  --  4.38*  --  3.47*   GFRESTIMATED 12* 21*  --  11*  --  14*   ESTUARDO 9.3 8.9  --  8.8  --  9.0   MAG 2.2 1.8  --  2.4*  --  2.4*   PHOS 4.8* 4.3  --  6.8*  --  6.1*   PROTTOTAL 6.1* 5.8*  --  5.4*  --  5.4*   ALBUMIN 3.8 3.6  --  3.4*  --  3.5   BILITOTAL 0.2 0.2  --  0.2  --  0.2   ALKPHOS 114 88  --  80  --  80   AST 17 15  " --  13  --  14   ALT 14 13  --  10  --  14    < > = values in this interval not displayed.     CBC:   Recent Labs   Lab 04/04/24  0320 04/03/24  0351 04/02/24  0306 04/01/24  0515   WBC 7.2 6.4 5.1 4.1   RBC 2.98* 2.74* 2.60* 2.56*   HGB 10.1* 9.5* 8.8* 8.7*   HCT 32.7* 30.3* 28.5* 28.9*   * 111* 110* 113*   MCH 33.9* 34.7* 33.8* 34.0*   MCHC 30.9* 31.4* 30.9* 30.1*   RDW 16.4* 16.3* 16.1* 16.4*    261 213 211       INR:   Recent Labs   Lab 04/04/24 0320 04/03/24  0351 04/02/24  0306 04/01/24  0515   INR 1.17* 1.26* 1.35* 1.29*       Glucose:   Recent Labs   Lab 04/04/24  0320 04/03/24  0351 04/02/24  2305 04/02/24  0306 04/01/24  0750 04/01/24  0515   * 119* 107* 125* 89 126*       Blood Gas:   Recent Labs   Lab 04/04/24  0320 04/03/24  1126 04/03/24  0351   PHV 7.26* 7.32 7.31*   PCO2V 64* 61* 66*   PO2V 46 38 43   HCO3V 29* 32* 33*   LINDY 0.8 4.3* 5.1*   O2PER 30 21 30       Culture Data No results for input(s): \"CULT\" in the last 168 hours.    Virology Data:   Lab Results   Component Value Date    FLUAH1 Not Detected 03/24/2024    FLUAH3 Not Detected 03/24/2024    YE2035 Not Detected 03/24/2024    IFLUB Not Detected 03/24/2024    RSVA Not Detected 03/24/2024    RSVB Not Detected 03/24/2024    PIV1 Not Detected 03/24/2024    PIV2 Not Detected 03/24/2024    PIV3 Not Detected 03/24/2024    HMPV Not Detected 03/24/2024       Historical CMV results (last 3 of prior testing):  Lab Results   Component Value Date    CMVQNT Not Detected 03/18/2024    CMVQNT Not Detected 02/19/2024    CMVQNT Not Detected 02/07/2024     Lab Results   Component Value Date    CMVLOG 3.2 07/12/2023    CMVLOG <2.1 04/19/2023    CMVLOG 3.5 01/25/2023       Urine Studies    Recent Labs   Lab Test 02/18/24  0222 05/18/23  0627   URINEPH 7.5* 5.0   NITRITE Negative Negative   LEUKEST Trace* Moderate*   WBCU 66* 21*       Most Recent Breeze Pulmonary Function Testing (FVC/FEV1 only)  FVC-Pre   Date Value Ref Range Status "   02/07/2024 1.19 L    01/10/2024 1.12 L    08/29/2023 1.48 L    07/25/2023 1.55 L      FVC-%Pred-Pre   Date Value Ref Range Status   02/07/2024 42 %    01/10/2024 39 %    08/29/2023 53 %    07/25/2023 55 %      FEV1-Pre   Date Value Ref Range Status   02/07/2024 1.13 L    01/10/2024 1.10 L    08/29/2023 1.43 L    07/25/2023 1.54 L      FEV1-%Pred-Pre   Date Value Ref Range Status   02/07/2024 51 %    01/10/2024 49 %    08/29/2023 64 %    07/25/2023 69 %        IMAGING    No results found for this or any previous visit (from the past 48 hour(s)).

## 2024-04-04 NOTE — PROGRESS NOTES
Date: 4/4/2024  Time: 1225     Data:  Pre Wt:   41 kg (standing scale)  Desired Wt:   To be established  Post Wt:  38.5 kg (bed scale)  Weight change: - 2.5 kg  Ultrafiltration - Post Run Net Total Removed (mL):  2500 ml  Vascular Access Status: Graft patent  Dialyzer Rinse:  Light  Total Blood Volume Processed: 79.2 L   Total Dialysis (Treatment) Time:   3.5 Hrs  Dialysate Bath: K 3, Ca 2.25  Heparin: Heparin: None     Lab:   No  HbsAg - 3/18/24 (non reactive)  HbsAb - 3/18/24 immune (69.70)      Interventions:  Dialysis done through Lt upper arm AV graft using 15 gauge needles. ,   UF initially set to 3.3 Liters, accommodating priming and rinse back volumes   CritLine wasn't set up during machine test, pt was monitored according to blood pressure. Pema CAMARILLO (Nephrology) is aware and advised to go by pt's BP for this run  Goal decreased to 2.8 Litres per hemodynamics  Epoetin 8000 units administered per MAR  Treatment has ended safely and  blood is rinsed back completely  Decannulation done post HD, hemostasis is achieved in 10 minutes  Pressure dressing is applied, to be removed after 4-6 hours  Left the pt in her room in stable condition  Report given to Brandyn PENA RN.      Assessment:  A/O x 4, calm and cooperative, denies pain  lt arm AV graft has good thrill and bruit                Plan:    Per Renal team        ALANNA MENDOZA, MISAELN, RN  Acute Dialysis RN  [facility]

## 2024-04-04 NOTE — PLAN OF CARE
"Goal Outcome Evaluation:      Plan of Care Reviewed With: patient    Overall Patient Progress: improvingOverall Patient Progress: improving    Outcome Evaluation: see RD note 4/4    Christie Deras, SARAHN, LD  [facility]  [employer]oc[employer] - \"4C Clinical Dietitian\"  Weekend/Holiday RD - \"Weekend Clinical Dietitian\"    "

## 2024-04-04 NOTE — PROGRESS NOTES
CLINICAL NUTRITION SERVICES - REASSESSMENT NOTE     Nutrition Prescription    RECOMMENDATIONS FOR MDs/PROVIDERS TO ORDER:  None currently     Malnutrition Status:    Severe malnutrition in the context of chronic illness/disease    Recommendations already ordered by Registered Dietitian (RD):  Calorie counts - to have data for metabolic cart study data interpretation  Continue PO as tolerated with TF as ordered to promote weight restoration.     Future/Additional Recommendations:  Metabolic cart study in afternoon of . Adjust nutrition support PRN pending results.   Monitor PO  Monitor weight loss     EVALUATION OF THE PROGRESS TOWARD GOALS   Diet: Regular and TF    Nutrition Support: access: GJ tube     Formula/schedule/modulars: 3/14-___: KF Renal Support @ 35 mL/hr x 22 hours/day (held for 1 hour before/after Synthroid) = 770 mL formula, 1386 Kcals, 62 gm pro, 131 gm CHO, 15 gm fiber, 516 mL free water daily     Free water flushes: 30 mL every 4 hours    Intake/Tolerance:   Tube Feedin day average tube feeding infusion of 755 mL (98 % of goal volume) = average intake of 1359 kcal/day and 60 g protein/day.  PO: Ordering 2 meals/day since extubated and eating 100% per I/O.      NEW FINDINGS   Pt extubated and started regular diet per SLP .   Pt reports she is happy to be eating again and eating is going well. Pt agreeable to continuing TF for weight restoration. Discussed plan for sudha counts for next 24 hours and met cart study tomorrow.     GI:  Last BM: 24  Banatrol TF 1 pkt Q12H started  d/t loose stools. TF + Banatrol provides total of 25 g fiber daily.  Chronic diarrhea     Weight:  Most Recent Weight: 41 kg (90 lb 4.8 oz)  on 24 via Standing scale  Body mass index is 16.62 kg/m .  7.8 kg (16%) weight loss over 1 month.    Meds:  Amiodarone  Calcium carbonate vitamin D TID  Cyclosporine  Levothyroxine   Prednisone   Renvela BID  Bactrim     Labs:   24 05:15 24  "03:06 04/03/24 03:51 04/04/24 03:20   Sodium 138 137 136 139   Potassium 3.5 3.6 3.6 4.0   Chloride 96 (L) 94 (L) 96 (L) 100   Carbon Dioxide (CO2) 27 26 29 25   Urea Nitrogen 47.7 (H) 62.5 (H) 29.6 (H) 46.2 (H)   Creatinine 3.47 (H) 4.38 (H) 2.56 (H) 3.97 (H)   GFR Estimate 14 (L) 11 (L) 21 (L) 12 (L)   Calcium 9.0 8.8 8.9 9.3   Anion Gap 15 17 (H) 11 14   Magnesium 2.4 (H) 2.4 (H) 1.8 2.2   Phosphorus 6.1 (H) 6.8 (H) 4.3 4.8 (H)   Albumin 3.5 3.4 (L) 3.6 3.8   Protein Total 5.4 (L) 5.4 (L) 5.8 (L) 6.1 (L)   Alkaline Phosphatase 80 80 88 114   ALT 14 10 13 14   AST 14 13 15 17   Bilirubin Total 0.2 0.2 0.2 0.2   Glucose 126 (H) 125 (H) 119 (H) 100 (H)       Respiratory:   Extubated 4/03    MALNUTRITION  % Intake: Decreased intake does not meet criteria  % Weight Loss: > 5% in 1 month (severe malnutrition)  Subcutaneous Fat Loss: Global/full body: severe   Muscle Loss: Global/full body: severe  Fluid Accumulation/Edema: Does not meet criteria  Malnutrition Diagnosis: Severe malnutrition in the context of chronic illness/disease    Previous Goals   Total avg nutritional intake to meet a minimum of 30 kcal/kg and 1.3 g PRO/kg daily (per dosing wt 46 kg).  Evaluation: Met    Previous Nutrition Diagnosis  Malnutrition related to history of EN intolerance as evidenced by prior severe weight loss with severe fat and muscle wasting    Evaluation: No change    CURRENT NUTRITION DIAGNOSIS  Malnutrition related to history of EN intolerance as evidenced by prior severe weight loss with severe fat and muscle wasting      INTERVENTIONS  Implementation  Metabolic cart study 4/05  Collaboration with other providers  Enteral Nutrition - continue      Goals  Total avg nutritional intake to meet a minimum of 30 kcal/kg and 13 g PRO/kg daily (per dosing wt 46 kg).    Monitoring/Evaluation  Progress toward goals will be monitored and evaluated per protocol.      Christie Kueny, RDN, LD  4C MICU RD  [employer]oc[employer] - \"4C Clinical " "Dietitian\"  Weekend/Holiday RD - \"Weekend Clinical Dietitian\"  "

## 2024-04-04 NOTE — PLAN OF CARE
ICU End of Shift Summary. See flowsheets for vital signs and detailed assessment.    Changes this shift: No acute changes. Continues on RA W/ clear-diminished lung sounds  HD stable. Dialyzed for 2.5L w/o midodrine tolerated well.    Plan: Transfer orders in place for IMC. Continue w/ goal of care.  Goal Outcome Evaluation: RA during the day and BiPAP while napping and at HS           Overall Patient Progress: no changeOverall Patient Progress: no change

## 2024-04-04 NOTE — PROGRESS NOTES
Nephrology Progress Note  04/04/2024         Assessment & Recommendations:   Sofie Rodriguez is a 63 year old year old female with ESKD, COPD s/p b/l lung transplant in 6/2022 with multiple complications, gastroparesis s/p GJ tube, GIB, chronic diarrhea, recurrent c-diff, FTT with inability to tolerate any tube feeds, R hip fx s/p ORIF 12/2023, admitted on 2/10 for initiation of TPN/lipids, transferred to ICU on 2/17 with worsening mental status and respiratory acidosis in spite of bipap, ultimately intubated on 2/18, being treated for PNA, also with volume overload in setting of high volume TPN. Persistent respiratory acidosis in spite of volume off, transferred to ICU for hypotension and respiratory failure, improved on bipap, now floor status again 3/1. Transferred to ICU 3/18 again with worsening hypercapnic respiratory failure. Transferred to floor 3/20, back to ICU 3/24    # ESKD - TTS, LUE AVG, 3hr, 45.5 kg, [facility], Dr. Andres Fairbanks.  - dialyzed daily last week, now back to euvolemia and will continue HD per TTS schedule, extra runs if needed. Pt is agreeable to 3.5 hr runs if it means avoiding 4x/week dialysis  - Requires EMLA cream an hour before HD  - please avoid PICC which will compromise future dialysis accesses; a midline is much preferred for this patient    # Persistent respiratory acidosis:    - now tolerating bipap at night and when napping  - transplant pulm following  - re-intubated 3/24 for bronch/BAL (NGTD), status improving with less trach support. Extubated 4/2, doing well, on RA (bipap when sleeping)    # Aflutter: on amio and BB  # Volume/BP: Anuric; on metoprolol soln 25 mg bid   - new EDW 38.5 kg        # Nutrition: on [employer] tube feeds     # Anemia 2/2 ESKD  On Venofer 50 qwk, Mircera last dose 1/9/2024  - hgb up to 10 today  - iron labs 3/31: ferritin 1007, iron 34, IS 26 (at goal)  - Will continue Venofer 50 mcg q week (Tuesday)  - continue epogen 8000 units via  "HD    # BMD:   - Ca 8-9's, phos 4.8, alb 3.8  - restarted sevelamer          Recommendations were communicated to primary team via note     RABIA Moctezuma   Division of Renal Disease and Hypertension  [phone]    Interval History :   Seen on dialysis, 2.5 kg off. Doing well. No n/v, CP, SOB, chills    Review of Systems:   4 point ROS neg other than as noted above    Physical Exam:   I/O last 3 completed shifts:  In: 1597 [P.O.:240; NG/GT:622]  Out: 3050 [Other:2500; Stool:550]   BP (!) 186/99   Pulse 92   Temp 97.5  F (36.4  C) (Oral)   Resp 15   Ht 1.57 m (5' 1.81\")   Wt 41 kg (90 lb 4.8 oz)   SpO2 100%   BMI 16.62 kg/m       GENERAL APPEARANCE: NAD  PULM: CTA  CV: RRR    Edema: none  GI: soft   INTEGUMENT: no cyanosis, no rashes on exposed skin  NEURO: a/o3  Access Left AVG     Labs:   All labs reviewed by me  Electrolytes/Renal -   Recent Labs   Lab Test 04/04/24 0320 04/03/24  0351 04/02/24  2305 04/02/24  0306    136  --  137   POTASSIUM 4.0 3.6  --  3.6   CHLORIDE 100 96*  --  94*   CO2 25 29  --  26   BUN 46.2* 29.6*  --  62.5*   CR 3.97* 2.56*  --  4.38*   * 119* 107* 125*   ESTUARDO 9.3 8.9  --  8.8   MAG 2.2 1.8  --  2.4*   PHOS 4.8* 4.3  --  6.8*       CBC -   Recent Labs   Lab Test 04/04/24 0320 04/03/24  0351 04/02/24  0306   WBC 7.2 6.4 5.1   HGB 10.1* 9.5* 8.8*    261 213       LFTs -   Recent Labs   Lab Test 04/04/24 0320 04/03/24  0351 04/02/24  0306   ALKPHOS 114 88 80   BILITOTAL 0.2 0.2 0.2   ALT 14 13 10   AST 17 15 13   PROTTOTAL 6.1* 5.8* 5.4*   ALBUMIN 3.8 3.6 3.4*       Iron Panel -   Recent Labs   Lab Test 03/31/24  0456 09/26/22  0555 09/03/22  1039   IRON 34* 54 21*   IRONSAT 26 22 9*   ACRLOS 1,007* 769* 343*         Imaging:  All imaging studies reviewed by me.     Current Medications:  Current Facility-Administered Medications   Medication Dose Route Frequency Provider Last Rate Last Admin    amiodarone (PACERONE) tablet 200 mg  200 mg Oral or Feeding Tube " Daily Sara Jorge APRN CNP   200 mg at 04/04/24 0825    apixaban ANTICOAGULANT (ELIQUIS) tablet 2.5 mg  2.5 mg Oral BID Betty Diaz MD   2.5 mg at 04/04/24 0825    calcium carbonate-vitamin D (CALTRATE) 600-10 MG-MCG per tablet 1 tablet  1 tablet Per J Tube TID w/meals Maribell Cloud MD   1 tablet at 04/04/24 1110    cloNIDine (CATAPRES) tablet 0.1 mg  0.1 mg Oral At Bedtime Virginia Fowler MD   0.1 mg at 04/03/24 2154    [Held by provider] cyanocobalamin (VITAMIN B-12) tablet 500 mcg  500 mcg Per Feeding Tube Daily Maribell Cloud MD   500 mcg at 03/22/24 0754    cycloSPORINE modified (GENERIC EQUIVALENT) microemulsion solution 100 mg  100 mg Per G Tube BID IS Sara Grayson NP   100 mg at 04/04/24 0827    fiber modular (BANATROL TF) packet 1 packet  1 packet Per Feeding Tube Q12H Maribell Cloud MD   1 packet at 04/04/24 1110    heparin lock flush 10 UNIT/ML injection 5-20 mL  5-20 mL Intracatheter Q24H Betty Diaz MD   5 mL at 04/02/24 1416    levothyroxine (SYNTHROID/LEVOTHROID) tablet 25 mcg  25 mcg Oral QAM AC Kalpana Mreino APRN CNP   25 mcg at 04/04/24 0613    Lidocaine (LIDOCARE) 4 % Patch 1 patch  1 patch Transdermal Q24h Maribell Cloud MD   1 patch at 04/03/24 2032    liothyronine (CYTOMEL) half-tab 2.5 mcg  2.5 mcg Oral BID Kalpana Merino APRN CNP   2.5 mcg at 04/04/24 0827    metoprolol tartrate (LOPRESSOR) tablet 25 mg  25 mg Per J Tube BID Sara Jorge APRN CNP   25 mg at 04/03/24 2034    OLANZapine zydis (zyPREXA) ODT tab 5 mg  5 mg Oral At Bedtime Betty Diaz MD   5 mg at 04/03/24 3219    pantoprazole (PROTONIX) 2 mg/mL suspension 40 mg  40 mg Per J Tube BID Maribell Cloud MD   40 mg at 04/04/24 0825    predniSONE (DELTASONE) tablet 5 mg  5 mg Per J Tube QA Maribell Cloud MD   5 mg at 04/04/24 0825    sevelamer carbonate (RENVELA) Packet 0.8 g  0.8 g Oral BID Claribel Franks MD   0.8 g at 04/04/24 0830    sodium chloride  (PF) 0.9% PF flush 10 mL  10 mL Intracatheter Q8H Betty Diaz MD   10 mL at 04/03/24 2036    sodium chloride (PF) 0.9% PF flush 10-40 mL  10-40 mL Intracatheter Q8H Betty Diaz MD   10 mL at 04/04/24 1428    sulfamethoxazole-trimethoprim (BACTRIM) 400-80 MG per tablet 1 tablet  1 tablet Oral Once per day on Tuesday Thursday Saturday Claribel Franks MD   1 tablet at 04/02/24 2013     Current Facility-Administered Medications   Medication Dose Route Frequency Provider Last Rate Last Admin    dextrose 10% infusion   Intravenous Continuous PRN Miguel Angel Stone MD        Patient is already receiving anticoagulation with heparin, enoxaparin (LOVENOX), warfarin (COUMADIN)  or other anticoagulant medication   Does not apply Continuous PRN Ting Aceves MD        Stop Heparin 60 minutes before end of treatment   Does not apply Continuous PRN Claribel Franks MD Dawn M Fuglestad, PA I, Mirna Georges Boumitri, saw and evaluated this patient as part of a shared visit.  I have reviewed and discussed with the advanced practice provider their history, physical and plan.  I have personally performed the substantive portion of the medical decision making for this visit - please see the EMILY's documentation for the full details  I personally reviewed the vital signs, medications, labs and imaging.    Key findings and management decisions made by me:  ESRD on dialysis, hypervolemia/ hypercarbia, now extubated.  Dialysis , tolerating 3 liters UF     Rayna Boland  Date of Service (when I saw the patient): 4/4

## 2024-04-04 NOTE — CONSULTS
"    GASTROENTEROLOGY PROGRESS NOTE    Date of Admission: 2/10/2024  Reason for Admission: Failure to Thrive      ASSESSMENT:  Sofie Rodriguez is a 63 year old female with a history of COPD s/p Lung Transplant in 2022 complicated by recurrent PNAs, EBV viremia, severe gastroparesis s/p GJ tube placement (07/2022), pyloric botox and G-POEM (10/9/2023), chronic diarrhea, recurrent c.diff colitis, ESRD on iHD (T/Th/Sat) who was initially admitted 2/10/24 for FTT/weight loss and initiation of TPN with course complicated by multiple ICU admissions for acute on chronic hypoxic and hypercarbic respiratory failure intermittently requiring intubation (most recently transferred to MICU on 3/24 with c/f for infection vs acute rejection) but now remains on intermittent Bipap(at HS)/RA during daytime.    Luminal GI team previously following after consulted 3/12 for possible colonoscopy for evaluation of chronic diarrhea. Now re-consulted 4/4 for \"Recurrent worsening of diarrhea, s/p C diff tx (POS on 3/12, fidaxomicin end 3/22), rechecking. Stool studies has shown osmotic diarrhea. Question if pt needs colonoscopy w/biopsy as this was the plan if diarrhea worsened again. Any additional recs\"     #S/p Lung Tx in 2022  #Afib (on Apixaban)  #History of SIBO/chronic osmotic diarrhea  #Acute on chronic diarrhea  #Recurrent c.diff colitis, s/p fidaxomicin treatment (completed 3/22)  Negative C.diff on 2/21/2024. Normal TSH recently, normal celiac serologies 05/2023. History of CMV viremia but most recent 02/19/2024 was negative. Currently on TPN, off tube feeds with persistent diarrhea. Previously thought to have  osmotic diarrhea with tube feeding and in the setting of small bowel dysmotility. Has a history of SIBO (on testing 9/2023) in the past treated with several antibiotic regimens (Cipro 500 BID x 14 days that did not improve sx, subsequently given Rifaximin which was seemingly helpful). Multiple conversations held in the past " with GI staff about this case, with very limited medical options for small bowel and possible pan-intestinal hypomotility from vagal nerve injury.  No endoscopic evaluation has been completed yet this adm.    Tested positive for C.diff on 3/12/2024 (positive PCR, Ag and toxin). Last positive PCR on 07/2022, but also had on 05/2023, both times treated with Vancomycin, the second time not improving well with Vancomycin. Alternatively completed 10 day course of Fidaxomicin (3/13-3/22) given suspected second (at least) recurrence.  Per review of flowsheets, having 1-4 loose stools prior to start of Fidaxomicin that not seem to significantly improved during course (recorded x1-8 loose stools/day) but possibly improved after completed course 3/22-3/28 (only doc 1-2 stools/day) but per patient's report her baseline stool frequency has been 6-8 stools/day and in the last 2 days worsened to x12 loose/watery stools and now with low bicarb warranting adjustment to bicarb in iHD runs by Nephrology and affecting acid/base stability.  Noted restarted abx (Zosyn 3/23-4/2 for pneumonia) and no ppx PO vanc given but recheck of C.diff PCR negative today (4/4) and pending recheck of enteric panel.    DDX for acute on chronic non-bloody diarrhea is expansive but mostly likely includes microscopic colitis versus medication-induced (recent abx, transplant medications toxicity) versus infectious (lower suspicion for recurrent C.diff given recheck PCR negative and diarrhea never significantly improved after fidaxomicin treatment, pending repeat enteric panel, possible suspicion for CMV/EBV given immunocompromised status post lung transplant).  Ongoing possibility for diarrhea related to malabsorptive/maldigestion etiology from SIBO given have not trailed repeat Rifaximin treatment this adm but lower suspicion for diarrhea solely related to osmotic effect from TF as worsening diarrhea despite same TF formula/rate.       Would proceed with  Colonoscopy to obtain bx to evaluate pseudomembranes post completion of C.diff treatment and bx to r/o other etiology above.  Given tenuous resp status and course, would ideally complete this under MAC and ensure patient and ICU teams are in agreement for temporary intubation if required post sedation/procedure (not to proceed directly to trach per pt's request based on previous ICU discussions).    #Gastroparesis s/p GJ tube placement, G-POEM without improvement  #Severe protein-calorie malnutrition  Regular diet with calorie counts in progress.  Receiving TF (Saavn Renal) @ 35 ml/hr.  Per patient, historically has never tried to infuse TF >70 ml/hr (for nocturnal feeds) and unclear max rate will tolerate when/if need to infuse prep via Jtube for colonoscopy.    RECOMMENDATIONS:  -- Discussed resp status with MICU team (NP Oanh) who agreed to a temporary period of re-intubation (not pursuing trach immediately) if Colonoscopy procedure was the impetus for re-intubation.  -- Plan to proceed with colonoscopy under MAC on Tues, 4/9 AM. To prepare please ensure the following:   - Request HD run be complete in afternoon this day to avoid interference with Colonoscopy.   - Clear liquid diet (no red, purple or blue) starting Mon, 4/8.  - Starting on Mon 4/8 at 16:00, HOLD TF and begin 4 L Golytely via Jtube (instructions: infuse prep via Jtube starting @ 50 ml/hr and advance rate by 20 ml q 1 hrs until reach goal of 150-200 ml/hr or patient reports c/o abd pain/N/V).   *Can STOP prep once stools are clear (mountain dew, tea or broth in color).  - Starting Tues, 4/9 at 04:00 if stools are not clear after initial 4 L, begin additional 2L Golytely (instructions: infuse at previously tolerated rate via Jtube)  - Please ensure multiple antiemetic options are available during prep.  - NPO status 4/9 at midnight besides additional prep if needed.  - Discuss timing for withholding Apixaban in HD patient with Pharmacy  (hold starting 4/7 if requires 2 day hold vs hold starting 4/5 if requires 4 day hold)   Continue to hold anticoagulants, antiplatelets.   - Follow repeat enteric stool panel.  - Consider empiric treatment for SIBO.     Discussed all above with patient, MICU, medicine team (maroon 3 who is accepting transfer from ICU today).    The patient was discussed and plan agreed upon with GI staff, Dr Stallworth.    GI Follow up (we will arrange):  TBD pending clinical course.    Overall time spent on the date of this encounter preparing to see the patient (including chart review of available notes, clinical status events, imaging and labs); obtaining interim history/subjective data; ordering and/or coordinating medications, tests or procedures; ordering medications, tests or procedures; communicating with other health care professionals; and documenting the above clinical information in the electronic medical record was 80 minutes.     Pauline Mccollum PA-C, RD, [facility]  Inpatient Gastroenterology Service  [facility]  Pager: *[phone]  Text Page     _______________________________________________________________      Subjective: Nursing notes and 24hr events reviewed.  Now on RA during the daytime, requiring Bipap at HS.  Intermittent HD run today (on TTS schedule).    Patient seen and examined at 14:00. Patient reports that she believes her baseline stool frequency has been 6-8 soft/loose stools daily the majority of this adm, does not believe had any significant improvement after started/completed her last treatment for C.diff and over the last 2 days had significant increase in stool frequency to 10-12 times/day and stools more watery.  No change to TF formula (on same formula majority of this adm). Reports majority of the stools are during the day but does have 3-4 overnight as well.  Occasional cramping with stools but denies having any consistent abd pain.     Expressed concern over the potential of  "sedation compromising her resp status (as per discussions with ICU team if she requires reintubation they would proceed with a trach) and initially did not want to proceed with colonoscopy if this was the case but would be agreeable if ICU team agreed to a temporary period of intubation/not pursuing trach immediately if procedure was the impetus for reintubation.    ROS:   4 pt ROS negative unless noted in subjective.     Objective:  Blood pressure (!) 140/58, pulse 85, temperature 97.5  F (36.4  C), temperature source Oral, resp. rate 20, height 1.57 m (5' 1.81\"), weight 41 kg (90 lb 4.8 oz), SpO2 100%, not currently breastfeeding.  Gen: Sitting up in chair eating lunch.  Pleasant and appears comfortable and in NAD.  HEENT: NCAT. Conjunctiva clear. Sclera anicteric.  CV: RRR, Peripheral perfusion intact  Resp: non-labored work of breathing on RA.  Abd: Soft, non-distended, non-tender to palpation in all quadrants, GJ tube with TF infusing via Jtube, Gtube clamped and site c/d/I.  Msk: no gross deformity  Skin:  no jaundice  Ext: warm, well perfused   Neuro: grossly normal  Mental status/Psych: A&O. Asks/answers questions appropriately     Date 04/04/24 0700 - 04/05/24 0659   Shift 3081-6865 6518-4103 3707-3124 24 Hour Total   INTAKE   P.O. 240   240   Other 0   0   NG/   210   Enteral 175   175   Shift Total(mL/kg) 625(15.26)   625(15.26)   OUTPUT   Other 2500   2500   Stool 350   350   Shift Total(mL/kg) 2850(69.58)   2850(69.58)   Weight (kg) 40.96 40.96 40.96 40.96     PROCEDURES:  No endoscopy this adm.    10/9/2023: G-POEM under GA by Dr. Navarro  Impression:    - Normal esophagus.                          - GJ tube in place and not manipulated                          - Normal examined duodenum.                          - Nodularity was found at the pylorus at the 11                          o'clock position. Likely hyperplastic from GJ tube.                          - Gastric peroral endoscopic myotomy " (G-POEM) was                          performed along greater curvature as described above.                          Mucosotomy closed with endoscopic suturing.                          - No specimens collected.     LABS:  BMP  Recent Labs   Lab 04/04/24 0320 04/03/24 0351 04/02/24 2305 04/02/24 0306 04/01/24  0750 04/01/24  0515    136  --  137  --  138   POTASSIUM 4.0 3.6  --  3.6  --  3.5   CHLORIDE 100 96*  --  94*  --  96*   ESTUARDO 9.3 8.9  --  8.8  --  9.0   CO2 25 29  --  26  --  27   BUN 46.2* 29.6*  --  62.5*  --  47.7*   CR 3.97* 2.56*  --  4.38*  --  3.47*   * 119* 107* 125*   < > 126*    < > = values in this interval not displayed.     CBC  Recent Labs   Lab 04/04/24 0320 04/03/24 0351 04/02/24 0306 04/01/24  0515   WBC 7.2 6.4 5.1 4.1   RBC 2.98* 2.74* 2.60* 2.56*   HGB 10.1* 9.5* 8.8* 8.7*   HCT 32.7* 30.3* 28.5* 28.9*   * 111* 110* 113*   MCH 33.9* 34.7* 33.8* 34.0*   MCHC 30.9* 31.4* 30.9* 30.1*   RDW 16.4* 16.3* 16.1* 16.4*    261 213 211     INR  Recent Labs   Lab 04/04/24 0320 04/03/24 0351 04/02/24 0306 04/01/24  0515   INR 1.17* 1.26* 1.35* 1.29*     LFTs  Recent Labs   Lab 04/04/24 0320 04/03/24 0351 04/02/24 0306 04/01/24  0515   ALKPHOS 114 88 80 80   AST 17 15 13 14   ALT 14 13 10 14   BILITOTAL 0.2 0.2 0.2 0.2   PROTTOTAL 6.1* 5.8* 5.4* 5.4*   ALBUMIN 3.8 3.6 3.4* 3.5      PANCNo lab results found in last 7 days.      IMAGING:  (Personally reviewed)    2/7/2024: CT CHEST/ABDOMEN/PELVIS W CONTRAST, 2/7/2024 1:34 PM     TECHNIQUE: Helical CT images from the thoracic inlet through the  symphysis pubis were obtained with intravenous contrast. Contrast  dose: Isovue 370 62cc     COMPARISON: CT 1/10/2024     HISTORY: H/o bialtera lung transplant, severe GI issues, loss of 40  lbs in the last year, GI out of ideas, assess for other pathology; on  dialysis; Lung replaced by transplant (H)     Additional history from the electronic medical record: end stage  COPD  s/p bilateral lung transplant on 6/28/22 complicated by acute limb  ischemia of LUE s/p left radial thrombectomy, h/o C. Diff, h/o EBV  viremia, ESRD on dialysis, hemobilia s/p ERCP presumably due to  hemorrhage into gallbladder, initially referred for gastroparesis s/p  PEGJ placement and chronic diarrhea with +SIBO testing. S/p G-POEM on  10/9/23      FINDINGS:     Chest:   Lungs: The trachea and central airways are patent. No pneumothorax.  Trace bilateral pleural effusions, unchanged. Decreased size of  opacity in the subpleural posterior left upper lobe measuring  approximately 1.0 x 1.4 cm (4/51), previously 1.1 x 1.7 cm when  measured in similar fashion on 1/10/2024. New tree-in-bud nodularity  in the posterior right lower lobe (4/158). Lower lobe predominant  interstitial septal thickening.     Mediastinum: The visualized thyroid gland is unremarkable.  Postoperative changes of bilateral lung transplant. The heart size is  within normal limits. Small pericardial effusion. Moderate to severe  atherosclerotic calcification of the coronary arteries. Normal caliber  aorta. The main pulmonary artery is enlarged measuring up to 3.3 cm.  Normal appearance and configuration of the great vessels off of the  aortic arch. No suspicious mediastinal or hilar lymph nodes, although  evaluation is limited by diffuse mediastinal soft tissue edema. No  axillary adenopathy. Patulous esophagus.     Abdomen and pelvis:     Liver: No mass. No intrahepatic biliary ductal dilation.  Central  pneumobilia.     Biliary System: The gallbladder is decompressed which limits  evaluation. Small volume pneumobilia within the gallbladder and common  bile duct. Prominent appearance of the common bile duct measuring up  to 1.2 cm. No extrahepatic biliary ductal dilation.     Pancreas: No pancreatic ductal dilation. Similar few scattered  hypoattenuating lesions in the parenchyma in keeping with known IPMNs.        Adrenal glands: No mass  or nodules     Spleen: Normal.     Kidneys: No suspicious mass, obstructing calculus or hydronephrosis.   Multiple nonobstructing calcified stones bilaterally.  Focal cortical  hypodensities, too small to characterize and statistically favored to  represent simple cysts.     Gastrointestinal tract : Percutaneous gastrojejunostomy tube in  appropriate position. No dilation of small and large bowel loops.     Mesentery/peritoneum/retroperitoneum: Small volume ascites. Diffuse  mesenteric edema. No free air.     Lymph nodes: No significant lymphadenopathy.     Vasculature: Patent major abdominal vasculature.     Pelvis: Urinary bladder is fully decompressed which limits evaluation.     Osseous structures: No aggressive or acute osseous lesion.  Femoral  neck fixation screws. Clamshell sternotomy wires appear intact with  underlying nonunion of the sternum. Degenerative changes in the spine.      Soft tissues: Diffuse body wall edema.                                                                   IMPRESSION:   1. Nonspecific central intrahepatic and extrahepatic pneumobilia. This  appears new compared to 1/10/2024.  2. Percutaneous gastrojejunostomy tube in appropriate position. No  evidence of obstruction.   3. Decreased left upper lung opacities suggestive of improving  infection. New subtle tree-in-bud nodularity in the right lower lobe  which may represent infectious versus inflammatory process.  4. Findings of volume overload with mediastinal edema, generalized  body wall edema, mesenteric edema and small volume ascites.

## 2024-04-04 NOTE — PROGRESS NOTES
[facility]    Progress Note - Medicine Service, NILE TEAM 3       Date of Admission:  2/10/2024    Assessment & Plan   Sofie Rodriguez is a 63 year old female with PMH COPD s/p bilateral lung transplant 6/28/22 c/b hemidiaphragm palsy and recurrent pneumonias, gastroparesis and small bowel dysmotility complicated by severe malnutrition now s/p PEG/J, ESRD on M/W/F HD, recent R femoral fx s/p ORIF, chronic diarrhea, recurrent c-diff, failure to thrive with inability to tolerate any tube feeds, who was admitted to [facility] on 2/10/24 for concerns over malnutrition and TPN initiation via portacath. Course complicated by recurrent and progressive acute on chronic hypoxic and hypercarbic respiratory failure requiring multiple ICU admissions and intubation 2/18-2/19, 2/28-2/29, 3/18-3/20, for continuous BiPAP. Again with worsening respiratory acidosis and hypercarbia, concern for infection vs acute rejection, requiring transfer back to ICU 3/20/2024 for intubation and bronchoscopy. Weaned off ventilator to RA and BIPAP at night. Transferred to medicine on 4/4/2024.     Changes today:   - C. Diff negative   - GI consulted by MICU team, appreciate recs  - Holding apixaban ahead of tentative scope with GI on Tuesday 4/9/2024   - Discontinue mucomyst   - Xopenex to PRN   - Metabolic cart 4/5    # Acute on chronic hypoxic and hypercarbic respiratory failure requiring intubation 2/17-2/19 3/25-4/2, improved  # Recurrent PNAs, concern for acute infection vs acute rejection  # S/p BSLT 6/8/22 for COPD  # R hemidiaphragm palsy   # Positive DSA   Hx bilateral lung transplant on 6/28/2022 for COPD.  Initially admitted on 2/10/2024 for initiation of TPN.  However she was admitted to the ICU on 2/17/2024 for hypoxic hypercarbic respiratory failure.  She was intubated on 2/18 - 2/19 due to pulmonary edema vs infection.  Was extubated onto BiPAP/AVAPS with improvement in  mentation however continued to have respiratory acidosis.She was transferred to medicine floor on 2/29.  Was transferred back to the ICU on 3/18 - 3/20 due to hypercarbia and severe respiratory acidosis that did not require intubation.  Patient again experience worsening respiratory acidosis and hypercarbia will concern for infection VS acute rejection that required transfer back to ICU on 3/20/2024 for intubation and bronch.  She was weaned off the ventilator to room air and BiPAP at night.  Transferred to medicine on 4//2024.  She has had imaging to evaluate neurologic causes of decreased respiratory drive, without any notable findings.  Mentation remained stable despite low pH and high pCO2.  Volume status has improved with hemodialysis.  Overall her pCO2 retention is thought to be multifactorial, with a component of overfeeding through TPN, infection, bicarb loss with diarrhea, and NIPPV intolerance due to claustrophobia.  - Daily VBG  - Transplant pulm following, appreciate recs  - Immunosuppression:   >Prednisone 5 mg every morning, 2.5 mg every afternoon  >Cyclosporine 100mg BID (Stopped tacrolimus 3/10)   >Overnight oximetry study suggestive of O2 vs CPAP need, as did require up to 2LPM overnight to prevent hypoxia  >Minimize O2 to preserve respiratory drive, given IVIG for DSA+   >D/c'd theophylline 3/3/24 due to difficulty attaining therapeutic levels (started by ICU), could consider Modafinil   >Repeat metabolic CART study ordered by Transplant Pulm on 4/5/2024  - Airway clearance/nebs:  --> resume BID scheduled if secretions worsen/thicken  - Xopenex neb BID PRN  - Mucomyst stopped 4/4   - Volume removal with iHD  - Limit medications that would depress respiration  - BIPAP at hs and naps at all times. Ideally 7-8 hours overnight, limited by claustrophobia, medications as below  - Zyprexa 5mg at bedtime   - Atarax 25 mg TID PRN for anxiety  - Clonidine 0.1 mg at bedtime  - High risk for reintubation given  hx of hypercarbia   - Will need bipap machine for home prior to discharge     # Goals of Care  - 3/15 Care conference on with Transplant Pulm, Pall Care, Medicine, daughters (Julia Nash) and Transplant SW. Overall medical updates provided and Qs answered. With declining respiratory acidosis on labs, discussed code status 3/18. Patient initially not desiring any escalation of her care to ICU/intubation with frustration re overall course. However, after discussing with her daughter on the phone she and family elected to remain full code and agreed to ICU admission and intubation if necessary.   - 3/29 Care conference: Laid out a few options for family and patient to consider with qs answered. Examples being we could attempt to extubate to BiPAP but plan should be established prior to extubation that if patient decompensates and requires reintubation then likely pursue trach versus more comfort approach. Other option is going straight for trach now. Patient and family (daughter Julia and son present on Ipad) considering the options and had a lot of questions about trach and what that would look like long-term, which was laid out for the family and patient.   - Pt made the decision this hospitalization, that if needed down the line she would be acceptable of a tracheostomy     # Severe malnutrition   # Failure to thrive  # Hypoalbuminemia  # Gastroparesis, small bowel dysmotility  # S/P PEG/J with intolerance of enteral nutrition  # Chronic osmotic diarrhea/SIBO s/p Rifaximin  # Recurrent C.Diff (3rd ep 3/12/24)    Patient with gastroparesis (presumed due to vagal injury) and small bowel dysmotility complicated by unintentional 40lb weight loss over the past year and now severe malnutrition. Previously intolerant to oral food intake due to nausea. Was initially admitted for portacath and TPN initiation since 2/13. Intermittently tolerating feeds without n/v from 2/20.  Per GI, no ongoing concerns for SIBO as of  . As of 3/11 transplant pulmonology is worried that TPN is not a realistic plan to continue at home and transitioned back to TF (RD oncerned pt is not absorbing any oral intake). TPN discontinued 3/16. Transplant pulm also worried about mucosal toxicity. Recurrent C.diff 3/12, Fidaxomicin x 10 days (3/13-3/22), with improvement in diarrhea. Transplant pulm requesting repeat colonoscopy w/ Bx after completion of c.diff treatment, to r/o toxicity or other reason that diarrhea is present.      24: Diarrhea worsened over past 24 hours. No leukocytosis. Reporting mild suprapubic pain, but no other abdo pain. Will check cdiff, re-consult GI, and start loperamide if C diff NEG.     - GI consult for recurrent diarrhea in the MICU, appreciate recs   - Check C diff, schedule loperamide if negative  - Metabolic cart 24 following 24 hours of calorie counts   - Confirmed Osmotic diarrhea with stool studies--> Likely not infectious, continue adjusting TF and loperamide PRN  - Nutrition consulted, appreciate assistance  - Continue TF at goal rate 35 ml/hr via PEG/J  - Speech saw pt 4/3 okay for regular diet with thin liquids; will continue pt tube feeds via pts g/j tube (hx of gastroparesis requiring TPN) until pt able to take in adequate PO nutrition          - Bowel regimen PRN     # Recurrent pneumonia, concern for acute infection vs rejection  # Recurrent C.Diff colitis (3rd ep 3/12/24)  # Hx EBV viremia   # Hypogammaglobulinemia  # Chronic immunosuppression / lung transplant  Empirically treated for pneumonia  - , RVP and cultures no growth to date. Recurrent C.Diff Positive 3/12, s/p PO Fidaxomicin 200 mg BID for 10 days (3/13-3/22) with improvement in diarrhea. Remains afebrile, leukocytosis resolved.  Ig, s/p IVIG.  EBV 27K (decreased compared to prior).     - Follow up BAL and blood cultures from 3/24    Antibiotics:  Zosyn ( - , 3/24 - )   Bactrim (MWF, PJP ppx, previously  on Dapsone)  Vancomycin (2/17 - 2/17, 3/24-3/25)  Micafungin (2/18- 2/21, 3/24 x1)  Fidaxomicin (3/13-3/22)     Micro:   - BAL 3/24:   - Cell count w diff: PMN 75%, lymphocytes 5, monocytes 19, eos 1  - No organisms seen on Gram stain  - RVP, HSV, Histoplasma neg  - Fungal & bacterial cultures NGTD  - EBV, CMV, PJP, Aspergillus, Legionella, Mycoplasma, AFB in process  - Bcx 3/24 NGTD    # A-flutter (recurrent on 3/17)  # A-fib, intermittent  # HTN  # HFpEF  Noted Afib/flutter intermittently throughout admission. Was started on amiodarone bolus with drip and transitioned to PO dosing.  2/17 TTE: LVEF 55-60%, global RV function normal, no significant valvular abnormalities. Briefly required levophed and midodrine for HD but otherwise stable off pressors.   - MAP goal > 65 mmHg   - Continue Metoprolol tartrate dose 25 mg BID (hold parameters if MAP<65 or hr less than 60, hold on dialysis days)  - Continue amiodarone 200 mg PO  - Midodrine with HD as needed  - holding Apixaban on 4/4/2024 for 4/9/2024 colonoscopy with biopsy after discussing with pharmacy    # Hypothyroidism  Patient with new (2/17/24) low T4 at 0.64 and TSH at 6.5, concerning for new hypothyroidism vs sick thyroid syndrome. TSH with appropriate response, more consistent with elevation in the setting of acute illness. ICU team on 2/28 recommended initiation of treatment for possible hypothyroid as this can improve respiratory muscle function in the setting of weakness and malnutrition. 3/7, 3/15, 3/20 repeat thyroid function WNL.  - Continue liothyronine + levothyroxine -- note that prolonged combination therapy is not optimal & regimen should be re-evaluated (likely stop liothyronine, up-titrate levothyroxine) in post-acute setting    # Hx of Anxiety   # Claustrophobia  Reports claustrophobia contributing to limited compliance with BiPAP. Has seen health psych on 3/20, recommended grounding exercise (5-4-3-2-1) for use on BiPAP.   - Zyprexa 5mg at  bedtime   - Atarax 25 mg TID PRN for anxiety  - Clonidine 0.1 mg at bedtime    Chronic issues      # ESRD on HD TTS   # Mild hyperphosphatemia  Patient is ESRD on T/Th/Sat HD as outpt, now approx M/W/F inpatient. HD tolerating until 3/23. Briefly required extra Monday runs, not since being off TPN. She would prefer longer sessions to more days.  - Midodrine 10mg BID PRN with dialysis days   - Discuss with nephrology if there are additional strategies to optimize bicarb   - Sevelamer per Nephrology   - CBC and CMP daily  - Strict I/Os  - Daily weight   - Renally adjust medications     #Acute on chronic anemia 2/2 ESRD  Hgb stable, with no acute signs of bleeding.   - Daily CBC  - Transfuse for Hgb < 7  - Continue Epogen with dialysis, held in setting of concern for acute infection   - Continue Venofer 50 mcg qweek with dialysis, held in setting of concern for acute infection    # Suspected CARLEE  # PTA nocturnal O2, 2L   - Sleep clinic eval at discharge for suspected CARLEE    # GERD  - PPI BID    # Hyperglycemia, stress induced  Has been euglycemic for the past few days. Not on insulin.  - Hypoglycemia protocol     Diet: Regular Diet Adult  Adult Formula Drip Feeding: Continuous Tinypass Renal Support; Jejunostomy; Goal Rate: 35 mL/hr (goal rate) held for 1 hour before/after Synthroid administration, per PharmD; mL/hr  Calorie Counts    DVT Prophylaxis: DOAC  Dong Catheter: Not present  Fluids: None  Lines: PRESENT      PICC 03/04/24 Double Lumen Right Basilic TPN. PICC okay to use.-Site Assessment: WDL  Hemodialysis Vascular Access Arteriovenous graft Superior Arm-Site Assessment: WDL;Bruit present;Thrill present      Cardiac Monitoring: ACTIVE order.    Code Status: Full Code      Clinically Significant Risk Factors              # Hypoalbuminemia: Lowest albumin = 2.6 g/dL at 2/18/2024  5:13 AM, will monitor as appropriate  # Coagulation Defect: INR = 1.17 (Ref range: 0.85 - 1.15) and/or PTT = N/A, will monitor for  bleeding            # Severe Malnutrition: based on nutrition assessment    # Financial/Environmental Concerns: none         Disposition Plan   Pending medical stability and scope with GI      The patient's care was discussed with the Attending Physician, Dr. Pantoja .    DELFIN LEDESMA MD  Medicine Service, [facility] TEAM 3  [facility]  Securely message with [employer] (more info)  Text page via [facility] Paging/Directory   See signed in provider for up to date coverage information  ______________________________________________________________________    Interval History   Saw patient after admission to medicine on 4/4/2024 in the PM. Patient notes no new complaints. She denies any abdominal pain on interview, yet describes ongoing loose bowel movements.  Notes some discomfort with bowel movements, but denies any fevers or chills.  She notes that she has been tolerating some oral intake without any nausea or vomiting.  She denies any chest pains or shortness of breath.  She notes that she was very edematous on admission, however that has improved significantly with dialysis.  She denies any issues with her tube feeds recently.     Physical Exam   Vital Signs: Temp: 98.5  F (36.9  C) Temp src: Oral BP: (!) 148/66 Pulse: 75   Resp: 22 SpO2: 100 % O2 Device: None (Room air) Oxygen Delivery: 4 LPM  Weight: 90 lbs 4.8 oz    General: Awake, alert & oriented. Frail appearing  HEENT: Mucous membranes moist  Neuro: Awake and alert, no focal deficits, moving all extremities to command and spontaneously  Pulm/Resp: Clear breath sounds bilaterally, diminished on R>L, no accessory muscle use  CV: RRR, S1/S2 without m/r/g; pedal pulses 2+ without LE edema  Abdomen: Soft, non-distended, non-tender  : Rectal tube in place, (+) bruit/thrill in LUE fistula  Incisions/Skin: PICC and PEG site without surrounding erythema, no rashes noted    Medical Decision Making       Please see A&P for additional  details of medical decision making.      Data   ------------------------- PAST 24 HR DATA REVIEWED -----------------------------------------------    I have personally reviewed the following data over the past 24 hrs:    7.2  \   10.1 (L)   / 334     139 100 46.2 (H) /  100 (H)   4.0 25 3.97 (H) \     ALT: 14 AST: 17 AP: 114 TBILI: 0.2   ALB: 3.8 TOT PROTEIN: 6.1 (L) LIPASE: N/A     INR:  1.17 (H) PTT:  N/A   D-dimer:  N/A Fibrinogen:  N/A       Imaging results reviewed over the past 24 hrs:   No results found for this or any previous visit (from the past 24 hour(s)).

## 2024-04-05 ENCOUNTER — APPOINTMENT (OUTPATIENT)
Dept: OCCUPATIONAL THERAPY | Facility: CLINIC | Age: 64
DRG: 640 | End: 2024-04-05
Attending: INTERNAL MEDICINE
Payer: MEDICARE

## 2024-04-05 ENCOUNTER — TELEPHONE (OUTPATIENT)
Dept: PULMONOLOGY | Facility: CLINIC | Age: 64
End: 2024-04-05
Payer: MEDICARE

## 2024-04-05 LAB
ALBUMIN SERPL BCG-MCNC: 3.8 G/DL (ref 3.5–5.2)
ALP SERPL-CCNC: 103 U/L (ref 40–150)
ALT SERPL W P-5'-P-CCNC: 12 U/L (ref 0–50)
ANION GAP SERPL CALCULATED.3IONS-SCNC: 11 MMOL/L (ref 7–15)
AST SERPL W P-5'-P-CCNC: 15 U/L (ref 0–45)
BASE EXCESS BLDV CALC-SCNC: 2.9 MMOL/L (ref -3–3)
BASOPHILS # BLD AUTO: 0.1 10E3/UL (ref 0–0.2)
BASOPHILS NFR BLD AUTO: 1 %
BILIRUB SERPL-MCNC: 0.2 MG/DL
BUN SERPL-MCNC: 30 MG/DL (ref 8–23)
CALCIUM SERPL-MCNC: 9.3 MG/DL (ref 8.8–10.2)
CHLORIDE SERPL-SCNC: 103 MMOL/L (ref 98–107)
CREAT SERPL-MCNC: 2.87 MG/DL (ref 0.51–0.95)
DEPRECATED HCO3 PLAS-SCNC: 28 MMOL/L (ref 22–29)
EGFRCR SERPLBLD CKD-EPI 2021: 18 ML/MIN/1.73M2
EOSINOPHIL # BLD AUTO: 0.5 10E3/UL (ref 0–0.7)
EOSINOPHIL NFR BLD AUTO: 8 %
ERYTHROCYTE [DISTWIDTH] IN BLOOD BY AUTOMATED COUNT: 16.7 % (ref 10–15)
GLUCOSE SERPL-MCNC: 108 MG/DL (ref 70–99)
HCO3 BLDV-SCNC: 31 MMOL/L (ref 21–28)
HCT VFR BLD AUTO: 33.6 % (ref 35–47)
HGB BLD-MCNC: 10.5 G/DL (ref 11.7–15.7)
IMM GRANULOCYTES # BLD: 0.3 10E3/UL
IMM GRANULOCYTES NFR BLD: 4 %
INR PPP: 1.1 (ref 0.85–1.15)
LYMPHOCYTES # BLD AUTO: 1 10E3/UL (ref 0.8–5.3)
LYMPHOCYTES NFR BLD AUTO: 16 %
MAGNESIUM SERPL-MCNC: 1.9 MG/DL (ref 1.7–2.3)
MCH RBC QN AUTO: 34.8 PG (ref 26.5–33)
MCHC RBC AUTO-ENTMCNC: 31.3 G/DL (ref 31.5–36.5)
MCV RBC AUTO: 111 FL (ref 78–100)
MONOCYTES # BLD AUTO: 0.7 10E3/UL (ref 0–1.3)
MONOCYTES NFR BLD AUTO: 10 %
NEUTROPHILS # BLD AUTO: 3.9 10E3/UL (ref 1.6–8.3)
NEUTROPHILS NFR BLD AUTO: 61 %
NRBC # BLD AUTO: 0 10E3/UL
NRBC BLD AUTO-RTO: 0 /100
O2/TOTAL GAS SETTING VFR VENT: 30 %
OXYHGB MFR BLDV: 81 % (ref 70–75)
PCO2 BLDV: 66 MM HG (ref 40–50)
PH BLDV: 7.28 [PH] (ref 7.32–7.43)
PHOSPHATE SERPL-MCNC: 3.3 MG/DL (ref 2.5–4.5)
PLATELET # BLD AUTO: 289 10E3/UL (ref 150–450)
PO2 BLDV: 50 MM HG (ref 25–47)
POTASSIUM SERPL-SCNC: 3.4 MMOL/L (ref 3.4–5.3)
PROT SERPL-MCNC: 6.1 G/DL (ref 6.4–8.3)
RBC # BLD AUTO: 3.02 10E6/UL (ref 3.8–5.2)
SAO2 % BLDV: 82.8 % (ref 70–75)
SODIUM SERPL-SCNC: 142 MMOL/L (ref 135–145)
WBC # BLD AUTO: 6.5 10E3/UL (ref 4–11)

## 2024-04-05 PROCEDURE — 250N000013 HC RX MED GY IP 250 OP 250 PS 637

## 2024-04-05 PROCEDURE — 99233 SBSQ HOSP IP/OBS HIGH 50: CPT | Mod: FS | Performed by: PHYSICIAN ASSISTANT

## 2024-04-05 PROCEDURE — 250N000011 HC RX IP 250 OP 636

## 2024-04-05 PROCEDURE — 85025 COMPLETE CBC W/AUTO DIFF WBC: CPT

## 2024-04-05 PROCEDURE — 99233 SBSQ HOSP IP/OBS HIGH 50: CPT | Performed by: NURSE PRACTITIONER

## 2024-04-05 PROCEDURE — 99232 SBSQ HOSP IP/OBS MODERATE 35: CPT | Mod: GC | Performed by: HOSPITALIST

## 2024-04-05 PROCEDURE — 97535 SELF CARE MNGMENT TRAINING: CPT | Mod: GO | Performed by: OCCUPATIONAL THERAPIST

## 2024-04-05 PROCEDURE — 84100 ASSAY OF PHOSPHORUS: CPT | Performed by: STUDENT IN AN ORGANIZED HEALTH CARE EDUCATION/TRAINING PROGRAM

## 2024-04-05 PROCEDURE — 250N000012 HC RX MED GY IP 250 OP 636 PS 637

## 2024-04-05 PROCEDURE — 80053 COMPREHEN METABOLIC PANEL: CPT

## 2024-04-05 PROCEDURE — 250N000012 HC RX MED GY IP 250 OP 636 PS 637: Performed by: NURSE PRACTITIONER

## 2024-04-05 PROCEDURE — 250N000013 HC RX MED GY IP 250 OP 250 PS 637: Performed by: STUDENT IN AN ORGANIZED HEALTH CARE EDUCATION/TRAINING PROGRAM

## 2024-04-05 PROCEDURE — 83735 ASSAY OF MAGNESIUM: CPT | Performed by: STUDENT IN AN ORGANIZED HEALTH CARE EDUCATION/TRAINING PROGRAM

## 2024-04-05 PROCEDURE — 120N000002 HC R&B MED SURG/OB UMMC

## 2024-04-05 PROCEDURE — 250N000013 HC RX MED GY IP 250 OP 250 PS 637: Performed by: INTERNAL MEDICINE

## 2024-04-05 PROCEDURE — 94681 O2 UPTK CO2 OUTP % O2 XTRC: CPT

## 2024-04-05 PROCEDURE — 85610 PROTHROMBIN TIME: CPT | Performed by: STUDENT IN AN ORGANIZED HEALTH CARE EDUCATION/TRAINING PROGRAM

## 2024-04-05 PROCEDURE — 94660 CPAP INITIATION&MGMT: CPT

## 2024-04-05 PROCEDURE — 99207 PR NO BILLABLE SERVICE THIS VISIT: CPT | Performed by: PHYSICIAN ASSISTANT

## 2024-04-05 PROCEDURE — 999N000157 HC STATISTIC RCP TIME EA 10 MIN

## 2024-04-05 PROCEDURE — 82805 BLOOD GASES W/O2 SATURATION: CPT

## 2024-04-05 RX ORDER — LOPERAMIDE HCL 1 MG/7.5ML
4 SOLUTION ORAL 4 TIMES DAILY PRN
Status: DISCONTINUED | OUTPATIENT
Start: 2024-04-05 | End: 2024-04-15 | Stop reason: HOSPADM

## 2024-04-05 RX ORDER — LOPERAMIDE HCL 1 MG/7.5ML
2 SOLUTION ORAL 4 TIMES DAILY PRN
Status: DISCONTINUED | OUTPATIENT
Start: 2024-04-05 | End: 2024-04-05

## 2024-04-05 RX ADMIN — Medication 40 MG: at 21:57

## 2024-04-05 RX ADMIN — LIDOCAINE 4% 1 PATCH: 40 PATCH TOPICAL at 21:58

## 2024-04-05 RX ADMIN — PREDNISONE 5 MG: 5 TABLET ORAL at 08:33

## 2024-04-05 RX ADMIN — METOPROLOL TARTRATE 25 MG: 25 TABLET, FILM COATED ORAL at 08:33

## 2024-04-05 RX ADMIN — LEVOTHYROXINE SODIUM 25 MCG: 0.03 TABLET ORAL at 07:01

## 2024-04-05 RX ADMIN — SEVELAMER CARBONATE 0.8 G: 800 POWDER, FOR SUSPENSION ORAL at 21:56

## 2024-04-05 RX ADMIN — AMIODARONE HYDROCHLORIDE 200 MG: 200 TABLET ORAL at 08:33

## 2024-04-05 RX ADMIN — Medication 2.5 MCG: at 21:59

## 2024-04-05 RX ADMIN — OLANZAPINE 5 MG: 5 TABLET, ORALLY DISINTEGRATING ORAL at 21:59

## 2024-04-05 RX ADMIN — CALCIUM CARBONATE 600 MG (1,500 MG)-VITAMIN D3 400 UNIT TABLET 1 TABLET: at 12:08

## 2024-04-05 RX ADMIN — LOPERAMIDE HCL 4 MG: 1 SOLUTION ORAL at 22:00

## 2024-04-05 RX ADMIN — CYCLOSPORINE 100 MG: 100 SOLUTION ORAL at 17:40

## 2024-04-05 RX ADMIN — CLONIDINE HYDROCHLORIDE 0.1 MG: 0.1 TABLET ORAL at 21:58

## 2024-04-05 RX ADMIN — METOPROLOL TARTRATE 25 MG: 25 TABLET, FILM COATED ORAL at 21:56

## 2024-04-05 RX ADMIN — SEVELAMER CARBONATE 0.8 G: 800 POWDER, FOR SUSPENSION ORAL at 08:34

## 2024-04-05 RX ADMIN — APIXABAN 2.5 MG: 2.5 TABLET, FILM COATED ORAL at 21:58

## 2024-04-05 RX ADMIN — CALCIUM CARBONATE 600 MG (1,500 MG)-VITAMIN D3 400 UNIT TABLET 1 TABLET: at 17:40

## 2024-04-05 RX ADMIN — Medication 2.5 MCG: at 08:33

## 2024-04-05 RX ADMIN — LOPERAMIDE HCL 2 MG: 1 SOLUTION ORAL at 10:52

## 2024-04-05 RX ADMIN — CALCIUM CARBONATE 600 MG (1,500 MG)-VITAMIN D3 400 UNIT TABLET 1 TABLET: at 08:33

## 2024-04-05 RX ADMIN — ACETAMINOPHEN 975 MG: 325 TABLET, FILM COATED ORAL at 21:58

## 2024-04-05 RX ADMIN — ONDANSETRON 4 MG: 2 INJECTION INTRAMUSCULAR; INTRAVENOUS at 14:03

## 2024-04-05 RX ADMIN — CYCLOSPORINE 100 MG: 100 SOLUTION ORAL at 08:33

## 2024-04-05 RX ADMIN — Medication 40 MG: at 08:33

## 2024-04-05 ASSESSMENT — ACTIVITIES OF DAILY LIVING (ADL)
ADLS_ACUITY_SCORE: 33

## 2024-04-05 NOTE — PROGRESS NOTES
Nephrology Progress Note  04/05/2024         Assessment & Recommendations:   Sofie Rodriguez is a 63 year old year old female with ESKD, COPD s/p b/l lung transplant in 6/2022 with multiple complications, gastroparesis s/p GJ tube, GIB, chronic diarrhea, recurrent c-diff, FTT with inability to tolerate any tube feeds, R hip fx s/p ORIF 12/2023, admitted on 2/10 for initiation of TPN/lipids, transferred to ICU on 2/17 with worsening mental status and respiratory acidosis in spite of bipap, ultimately intubated on 2/18, being treated for PNA, also with volume overload in setting of high volume TPN. Persistent respiratory acidosis in spite of volume off, transferred to ICU for hypotension and respiratory failure, improved on bipap, now floor status again 3/1. Transferred to ICU 3/18 again with worsening hypercapnic respiratory failure. Transferred to floor 3/20, back to ICU 3/24    # ESKD - TTS, LUE AVG, 3hr, prior to admission EDW 45.5 kg, now much lower at 38.5 kg, [facility], Dr. Andres Fairbanks.  - dialyzed daily last week, now back to euvolemia and will continue HD per TTS schedule. Pt is agreeable to 3.5 hr runs if it means avoiding 4x/week dialysis  - Requires EMLA cream an hour before HD  - please avoid PICC which will compromise future dialysis accesses; a midline is much preferred for this patient    # Persistent respiratory acidosis:    - now tolerating bipap at night and when napping  - transplant pulm following  - re-intubated 3/24 for bronch/BAL (NGTD), status improving with less trach support. Extubated 4/2, doing well, on RA (bipap when sleeping)    # Aflutter: on amio and BB  # Volume/BP: Anuric; on metoprolol soln 25 mg bid   - new EDW 38.5 kg        # Nutrition: on [employer] tube feeds and regular diet    # Anemia 2/2 ESKD  On Venofer 50 qwk, Mircera last dose 1/9/2024  - hgb up to 10 x 2 days  - iron labs 3/31: ferritin 1007, iron 34, IS 26 (at goal)  - Will continue Venofer 50 mcg q week  "(Tuesday)  - will reduce epogen from 8000 to 4000 units via HD    # BMD:   - Ca 8-9's, phos 3.3, alb 3.8  - on sevelamer bid (will monitor, may need to stop again)         Recommendations were communicated to primary team via note     RABIA Moctezuma   Division of Renal Disease and Hypertension  [phone]    Interval History :   Seen bedside, doing well, up out of bed. HD tomorrow. No n/v, CP, SOB, chills    Review of Systems:   4 point ROS neg other than as noted above    Physical Exam:   I/O last 3 completed shifts:  In: 1550 [P.O.:240; NG/GT:540]  Out: 3000 [Other:2650; Stool:350]   BP (!) 146/57   Pulse 81   Temp 97.6  F (36.4  C) (Axillary)   Resp 18   Ht 1.57 m (5' 1.81\")   Wt 38.5 kg (84 lb 14 oz)   SpO2 98%   BMI 15.62 kg/m       GENERAL APPEARANCE: NAD  PULM: CTA  CV: RRR    Edema: none  GI: soft   INTEGUMENT: no cyanosis, no rashes on exposed skin  NEURO: a/o3  Access Left AVG     Labs:   All labs reviewed by me  Electrolytes/Renal -   Recent Labs   Lab Test 04/05/24 0437 04/04/24 0320 04/03/24  0351    139 136   POTASSIUM 3.4 4.0 3.6   CHLORIDE 103 100 96*   CO2 28 25 29   BUN 30.0* 46.2* 29.6*   CR 2.87* 3.97* 2.56*   * 100* 119*   ESTUARDO 9.3 9.3 8.9   MAG 1.9 2.2 1.8   PHOS 3.3 4.8* 4.3       CBC -   Recent Labs   Lab Test 04/05/24 0437 04/04/24  0320 04/03/24  0351   WBC 6.5 7.2 6.4   HGB 10.5* 10.1* 9.5*    334 261       LFTs -   Recent Labs   Lab Test 04/05/24 0437 04/04/24  0320 04/03/24  0351   ALKPHOS 103 114 88   BILITOTAL 0.2 0.2 0.2   ALT 12 14 13   AST 15 17 15   PROTTOTAL 6.1* 6.1* 5.8*   ALBUMIN 3.8 3.8 3.6       Iron Panel -   Recent Labs   Lab Test 03/31/24  0456 09/26/22  0555 09/03/22  1039   IRON 34* 54 21*   IRONSAT 26 22 9*   CARLOS 1,007* 769* 343*         Imaging:  All imaging studies reviewed by me.     Current Medications:  Current Facility-Administered Medications   Medication Dose Route Frequency Provider Last Rate Last Admin    amiodarone " (PACERONE) tablet 200 mg  200 mg Oral or Feeding Tube Daily Sara Jorge APRN CNP   200 mg at 04/05/24 0833    [Held by provider] apixaban ANTICOAGULANT (ELIQUIS) tablet 2.5 mg  2.5 mg Oral BID Betty Diaz MD   2.5 mg at 04/04/24 0825    calcium carbonate-vitamin D (CALTRATE) 600-10 MG-MCG per tablet 1 tablet  1 tablet Per J Tube TID w/meals Maribell Cloud MD   1 tablet at 04/05/24 0833    cloNIDine (CATAPRES) tablet 0.1 mg  0.1 mg Oral At Bedtime Virginia Fowler MD   0.1 mg at 04/04/24 2235    [Held by provider] cyanocobalamin (VITAMIN B-12) tablet 500 mcg  500 mcg Per Feeding Tube Daily Maribell Cloud MD   500 mcg at 03/22/24 0754    cycloSPORINE modified (GENERIC EQUIVALENT) microemulsion solution 100 mg  100 mg Per G Tube BID IS Sara Grayson NP   100 mg at 04/05/24 0833    fiber modular (BANATROL TF) packet 1 packet  1 packet Per Feeding Tube Q12H Maribell Cloud MD   1 packet at 04/04/24 2236    heparin lock flush 10 UNIT/ML injection 5-20 mL  5-20 mL Intracatheter Q24H Betty Diaz MD   5 mL at 04/02/24 1416    levothyroxine (SYNTHROID/LEVOTHROID) tablet 25 mcg  25 mcg Oral QAM AC Kalpana Merino APRN CNP   25 mcg at 04/05/24 0701    Lidocaine (LIDOCARE) 4 % Patch 1 patch  1 patch Transdermal Q24h Maribell Cloud MD   1 patch at 04/04/24 1944    liothyronine (CYTOMEL) half-tab 2.5 mcg  2.5 mcg Oral BID Kalpana Merino APRN CNP   2.5 mcg at 04/05/24 0833    metoprolol tartrate (LOPRESSOR) tablet 25 mg  25 mg Per J Tube BID Sara Jorge APRN CNP   25 mg at 04/05/24 0833    OLANZapine zydis (zyPREXA) ODT tab 5 mg  5 mg Oral At Bedtime Betty Diaz MD   5 mg at 04/04/24 3164    pantoprazole (PROTONIX) 2 mg/mL suspension 40 mg  40 mg Per J Tube BID Maribell Cloud MD   40 mg at 04/05/24 0833    predniSONE (DELTASONE) tablet 5 mg  5 mg Per J Tube QAM Maribell Cloud MD   5 mg at 04/05/24 0833    sevelamer carbonate (RENVELA) Packet 0.8 g  0.8 g  Oral BID Claribel Franks MD   0.8 g at 04/05/24 0834    sodium chloride (PF) 0.9% PF flush 10 mL  10 mL Intracatheter Q8H Betty Diaz MD   10 mL at 04/05/24 0428    sodium chloride (PF) 0.9% PF flush 10-40 mL  10-40 mL Intracatheter Q8H Betty Diaz MD   10 mL at 04/04/24 1428    sulfamethoxazole-trimethoprim (BACTRIM) 400-80 MG per tablet 1 tablet  1 tablet Oral Once per day on Tuesday Thursday Saturday Claribel Franks MD   1 tablet at 04/04/24 2010     Current Facility-Administered Medications   Medication Dose Route Frequency Provider Last Rate Last Admin    dextrose 10% infusion   Intravenous Continuous PRN Miguel Angel Stone MD        Patient is already receiving anticoagulation with heparin, enoxaparin (LOVENOX), warfarin (COUMADIN)  or other anticoagulant medication   Does not apply Continuous PRN Ting Aceves MD Dawn M Fuglestad, PA I, Rayna Boland, saw and evaluated this patient as part of a shared visit.  I have reviewed and discussed with the advanced practice provider their history, physical and plan.  I have personally performed the substantive portion of the medical decision making for this visit - please see the EMILY's documentation for the full details  I personally reviewed the vital signs, medications, labs and imaging.    Key findings and management decisions made by me:  ESRD, respiratory failure / hypercarbia.  Dialysis tomorrow for volume and solute control. She is doing well overall. Can try a little higher bicarbonate bath to help offset respiratory acidosis.    Rayna Boland  Date of Service (when I saw the patient): 4/5

## 2024-04-05 NOTE — PLAN OF CARE
ICU End of Shift Summary. See flowsheets for vital signs and detailed assessment.    Changes this shift: Patient slept between cares, no acute events overnight. Tolerated BiPAP for most of the night. Standby assist to bedside commode, multiple loose stools overnight. Denies pain. TF running at goal rate.     Plan: Continue to monitor closely. Transfer to C when bed available.     Ximena Danielle RN on 4/5/2024 at 6:36 AM

## 2024-04-05 NOTE — PROGRESS NOTES
Calorie Count  Intake recorded for: 4/4  Total Kcals: 1370 Total Protein: 30g  Kcals from Hospital Food: 473   Protein: 11g  Kcals from Outside Food (average):897 Protein: 19g  # Meals Ordered from Kitchen: 2 meals  # Meals Recorded: 2 meals + outside food  (First - 100% cream of wheat w/ 8 sugar packets, 2 nino strips, 25% pineapple)  (Second - 100% 1 bag of baked Lays chips, popsicle)  (Outside - 100% toast w/ peanut butter, 1 strawberry Pop-tart, 1 Crunch bar, 1 100 Grand bar, 1 Baby Karma bar, 3 Milk Duds, 3 Cunningham's Whoppers ( all candy bars are fun-sized)  # Supplements Recorded: 0

## 2024-04-05 NOTE — PROGRESS NOTES
"CLINICAL NUTRITION SERVICES - BRIEF NOTE    See RD note 4/4 for full assessment.     Nutrition Prescription    Recommendations already ordered by Registered Dietitian (RD):  No changes, continue current nutrition plan of care    Future/Additional Recommendations:  Monitor PO, TF intakes, and weight.        New findings:   Obtained metabolic cart study 4/05 @ 1137 with the following results: MREE = 1735 kcals/day (equiv to 45 kcal/actual kg/day) with RQ = 0.77.  Pt received 770 ml of TF in 24 hours preceding the study + PO of about 1370 kcal (+ breakfast 4/5) providing 2756 kcals (159 % MREE).  RQ within physiologic range; RQ not logical given provisions (over fed) received prior to study.  Study illogical, would not make changes to nutrition plan of care based on these results.     PO 4/04 per sudha count: Total Kcals: 1370     Total Protein: 30g     Diet: Regular + TF          RD to follow per protocol    Christie Deras, SARAHN, LD  [address] [facility] RD  Vocera - \" Clinical Dietitian\"  Weekend/Holiday RD - \"Weekend Clinical Dietitian\"      "

## 2024-04-05 NOTE — PROGRESS NOTES
Pulmonary Medicine  Cystic Fibrosis - Lung Transplant Team  Progress Note  2024     Patient: Sofie Rodriguez  MRN: [id number]  : 1960 (age 63 year old)  Transplant: 2022 (Lung), POD#[id number]  Admission date: 2/10/2024    Assessment & Plan:     Sofie Rodriguez is a 63 year old female with h/o COPD s/p BSLT () with course complicated by post-operative hemidiaphragm palsy, recurrent PNAs, positive DSA, EBV viremia, hypogammaglobulinemia, severe gastroparesis s/p G/J tube placement (22) and pyloric botox (23), GI bleed /2 pyloric ulcer, hemobilia s/p ERCP and MRCP, chronic diarrhea, recurrent C diff colitis, ESRD on iHD, recurrent falls with injuries (recent right hip fx s/p ORIF 2023), deconditioning, and FTT.  Admitted 2/10 for failure to thrive and initiation of TPN/lipids.  Emergent intubation - for hypoxic and hypercapneic respiratory failure and encephalopathy. Returned to ICU - and again 3/18-3/20 d/t tenuous respiratory status complicated by variable daytime NIPPV tolerance and hypervolemia with iHD limited d/t hypotension. Again transferred back to ICU 3/24 for intubation and bronch/BAL given hypoxic and hypercapneic respiratory failure. CT with extensive bilateral infiltrate and etiology likely multifactorial including volume overload and infection, possible mucous plugging, ACR or AMR less likely.  Extubated , no daytime hypoxia and hypercapnia remains stable with good adherence to NIPPV with sleep.  Tolerating PO but with persistent diarrhea, improving some.      Today's recommendations:  - Daily VBG, goal for permissive hypercapnea to mid 60's  - Strict adherence to NIPPV overnight and with all naps, should obtain home NIPPV machine and work to transition to long-term device while inpatient  - Repeat CXR  (ordered)  - CSA level  (ordered)  - DSA and EBV due   - Calorie counts continue, consider BID supplemental ice cream shakes  - Metabolic cart  study today  - Colonoscopy planned for 4/9 but pt. deferred d/t risk for reintubation and improvement in diarrhea today     Acute on chronic hypoxic/hypercapneic respiratory failure:  S/p bilateral sequential lung transplant for COPD:   Hypoventilation, Suspected CARLEE:  PFTs in clinic remained significantly below her baseline.  Baseline hypoxia with 2L NC overnight PTA.  S/p intubation 2/17-2/19 for acute hypoxic/hypercapneic respiratory failure with encephalopathy, CT with concern for infection and pulmonary edema given recent addition of TPN nutrition.  Bronch (2/18) with very friable tissue, cutlures only with C. glabrata.  Persistent respiratory failure also complicated by hypoventilation and deconditioning d/t malnutrition. Neurology consulted 2/27, MRI brain (3/1) without acute intracranial pathology.  SNIFF (3/8) normal.  Metabolic CART suggested overfeeding on TPN.  Returned to ICU (3/18-3/20) d/t tenuous respiratory status complicated by NIPPV intolerance and hypervolemia with iHD limited d/t hypotension (CXR with increased pulmonary edema).  Overall, her PCO2 retention is likely multifactorial with a component being from overfeeding (though remedied with nutrition adjustment), metabolic compensation barrier d/t renal failure, pulmonary edema, bicarb loss with diarrhea, hypoventilation with declining NIF and FVC concerning for neuromuscular etiology (though Neurology consult was not revealing), and NIPPV intolerance due to claustrophobia. Worsening hypoxic and hypercapneic respiratory failure 3/24 and transferred to ICU for intubation. CT chest with extensive bilateral infiltrates markedly increased from previous.  Bronch with small L>R sided secretions easily suctioned, BAL with no evidence of DAH, non bloody.  S/p Zosyn (3/23-4/2) for 10 day empiric course.  Extubated 4/2, hypercapnia stable with consistent overnight BiPAP use.  - Nebs and Volera QID stopped 4/4 per MICU, monitor closely for need to resume  nebs (sticky, tenacious secretions)  - Daily VBG, goal for permissive hypercapnea to mid 60's  - Strict adherence to NIPPV overnight and with all naps, should obtain home NIPPV machine and work to transition to long-term device while inpatient  - Repeat CXR 4/8 (ordered)     Immunosuppression:  AZA previously stopped 5/2023 d/t low ImmuKnow assay and EBV viremia.  ImmuKnow (2/27) remained low at 88.  ImmuKnow (4/1) further decreased to 43.  Transitioned from [place] to CSA 3/11.  -  BID (decreased 4/3).  Goal 120-140 (decreased 4/4 for ImmuKnow).  Repeat level 4/6 (ordered).  - Prednisone 5 mg daily     Prophylaxis:   - Bactrim qMWF for PJP ppx  - CMV ppx not currently indicated, D+/R+, CMV negative 3/18     Positive DSA: AMR treatment deferred given frailty and stable PFTs.  DSA DQB2 decreased on 3/6 with 5667 mfi, and again decreased to 4960 on 3/23.  Most recent cell-free DNA (2/18) mildly elevated at 1.04 (concerning for possible rejection), which was increased from prior level of 0.12 (1/10).  IST increase deferred at that time per Dr. Gong.  S/p IVIG (2/27) for DSA (IgG WNL).  Immuknow as above.  Prospera (cell free DNA) 0.44 (3/18) suggests AMR less likely, follow  - Repeat DSA monthly (due 4/23)      EBV viremia: CT CAP (2/7) without lymphadenopathy.  Recent levels improving (3/18) 23k.  - Repeat EBV due 4/23     Other relevant problems being managed by the primary team:      FTT:  Severe protein calorie malnutrition:  Gastroparesis s/p PEG/J, botox, and G-POEM:  SB hypomotility:  Pyloric ulcer:  Chronic nausea and osmotic diarrhea:  SIBO s/p rifaximin:   Recurrent C diff colitis: Chronic osmotic and infectious diarrhea since transplant with recurrent episodes of C diff.  Notable weight loss (40# in a year) d/t diarrhea, GI dysmotility, and intolerance of enteral feeds (PEG/J tube in place), most recently on elemental formula.  Extensive OP eval and f/u with GI. S/p port placement for TPN and lipids.  Continued for ~5 weeks inpatient without considerable improvement, transitioned back to TF.  C diff positive (3/13), on fidaxomicin.  Repeat (4/4) negative, enteric B/V panel also negative.  Loose stools persist, GI consulted 4/4.  - Calorie counts continue, consider BID supplemental ice cream shakes  - Metabolic cart study today  - Colonoscopy planned for 4/9 but pt. deferred d/t risk for reintubation and improvement in diarrhea today     ESRD: CT with volume overload secondary to TPN volume, iHD increased from PTA TThSa schedule with unsustained improvement.  Management per Nephrology, dialysis via [facility].  Unable to remove fluid on 3/23 d/t hypotension, tolerance now improved with midodrine on HD days.  Volume removal and dialysis per nephrology.     Goals of care: Care conference held with palliative and primary team on 3/15 for medical update with pt. and daughters.  Repeat care conference 3/29 (see palliative and MICU notes) patient would like to proceed with re-intubation and trach if pt. fails extubation d/t progressive hypercapnia, understanding that this would severely complicate potential disposition options.     We appreciate the excellent care provided by the [place] [id number] team.  Recommendations communicated via in person rounding and this note.  Will continue to follow along closely, please do not hesitate to call with any questions or concerns.     Patient discussed with Dr. Castillo.    Catherine Johnson, DNP, APRN, CNP  Inpatient Nurse Practitioner  Pulmonary CF/Transplant     Subjective & Interval History:     Rooms on RA during the day, continued adherence to full overnight AVAPS for the past 3 nights with stable hypercapnia.  Reports 8 loose stools yesterday but now with only 1 so far this morning.  Tolerating PO, denies nausea, fullness, or abdominal pain.    Review of Systems:     C: No fever, no chills, + decreased weight  INTEGUMENTARY/SKIN: No rash or obvious new lesions  ENT/MOUTH: No  nasal congestion or drainage  RESP: See interval history  CV: No chest pain, no palpitations, no peripheral edema, no orthopnea  GI: No reflux symptoms  : Oliguria  MUSCULOSKELETAL: No myalgias, no arthralgias  ENDOCRINE: Blood sugars with adequate control  NEURO: No headache, no numbness or tingling  PSYCHIATRIC: Mood stable    Physical Exam:     All notes, images, and labs from past 24 hours (at minimum) were reviewed.    Vital signs:  Temp: 97.7  F (36.5  C) Temp src: Axillary BP: 127/47 Pulse: 77   Resp: 15 SpO2: 99 % O2 Device: BiPAP/CPAP Oxygen Delivery: 4 LPM   Weight: 38.5 kg (84 lb 14 oz)  I/O:   Intake/Output Summary (Last 24 hours) at 4/5/2024 0701  Last data filed at 4/5/2024 0600  Gross per 24 hour   Intake 1550 ml   Output 3000 ml   Net -1450 ml     Constitutional: Sitting up in a chair, frail appearing, in no apparent distress.   HEENT: Eyes with pink conjunctivae, anicteric.  Oral mucosa moist without lesions.   PULM: Mildly diminished bases bilaterally.  No crackles, no rhonchi, no wheezes.  Non-labored breathing on RA.  CV: Normal S1 and S2.  RRR.  No murmur, gallop, or rub.  No peripheral edema.   ABD: NABS, soft, nontender, nondistended.  PEG/J tube site not visualized.  MSK: Moves all extremities.  + muscle wasting.   NEURO: Alert and conversant.   SKIN: Warm, dry, fragile, scattered ecchymosis.   PSYCH: Mood stable.     Data:     LABS    CMP:   Recent Labs   Lab 04/05/24  0437 04/04/24  0320 04/03/24  0351 04/02/24  2305 04/02/24  0306    139 136  --  137   POTASSIUM 3.4 4.0 3.6  --  3.6   CHLORIDE 103 100 96*  --  94*   CO2 28 25 29  --  26   ANIONGAP 11 14 11  --  17*   * 100* 119* 107* 125*   BUN 30.0* 46.2* 29.6*  --  62.5*   CR 2.87* 3.97* 2.56*  --  4.38*   GFRESTIMATED 18* 12* 21*  --  11*   ESTUARDO 9.3 9.3 8.9  --  8.8   MAG 1.9 2.2 1.8  --  2.4*   PHOS 3.3 4.8* 4.3  --  6.8*   PROTTOTAL 6.1* 6.1* 5.8*  --  5.4*   ALBUMIN 3.8 3.8 3.6  --  3.4*   BILITOTAL 0.2 0.2 0.2  --  0.2  "  ALKPHOS 103 114 88  --  80   AST 15 17 15  --  13   ALT 12 14 13  --  10     CBC:   Recent Labs   Lab 04/05/24 0437 04/04/24  0320 04/03/24  0351 04/02/24  0306   WBC 6.5 7.2 6.4 5.1   RBC 3.02* 2.98* 2.74* 2.60*   HGB 10.5* 10.1* 9.5* 8.8*   HCT 33.6* 32.7* 30.3* 28.5*   * 110* 111* 110*   MCH 34.8* 33.9* 34.7* 33.8*   MCHC 31.3* 30.9* 31.4* 30.9*   RDW 16.7* 16.4* 16.3* 16.1*    334 261 213       INR:   Recent Labs   Lab 04/05/24 0437 04/04/24 0320 04/03/24  0351 04/02/24  0306   INR 1.10 1.17* 1.26* 1.35*       Glucose:   Recent Labs   Lab 04/05/24 0437 04/04/24  0320 04/03/24  0351 04/02/24  2305 04/02/24  0306 04/01/24  0750   * 100* 119* 107* 125* 89       Blood Gas:   Recent Labs   Lab 04/05/24 0437 04/04/24  0320 04/03/24  1126   PHV 7.28* 7.26* 7.32   PCO2V 66* 64* 61*   PO2V 50* 46 38   HCO3V 31* 29* 32*   LINDY 2.9 0.8 4.3*   O2PER 30 30 21       Culture Data No results for input(s): \"CULT\" in the last 168 hours.    Virology Data:   Lab Results   Component Value Date    FLUAH1 Not Detected 03/24/2024    FLUAH3 Not Detected 03/24/2024    AT2758 Not Detected 03/24/2024    IFLUB Not Detected 03/24/2024    RSVA Not Detected 03/24/2024    RSVB Not Detected 03/24/2024    PIV1 Not Detected 03/24/2024    PIV2 Not Detected 03/24/2024    PIV3 Not Detected 03/24/2024    HMPV Not Detected 03/24/2024       Historical CMV results (last 3 of prior testing):  Lab Results   Component Value Date    CMVQNT Not Detected 03/18/2024    CMVQNT Not Detected 02/19/2024    CMVQNT Not Detected 02/07/2024     Lab Results   Component Value Date    CMVLOG 3.2 07/12/2023    CMVLOG <2.1 04/19/2023    CMVLOG 3.5 01/25/2023       Urine Studies    Recent Labs   Lab Test 02/18/24  0222 05/18/23  0627   URINEPH 7.5* 5.0   NITRITE Negative Negative   LEUKEST Trace* Moderate*   WBCU 66* 21*       Most Recent [place] Pulmonary Function Testing (FVC/FEV1 only)  FVC-Pre   Date Value Ref Range Status   02/07/2024 1.19 L  "   01/10/2024 1.12 L    08/29/2023 1.48 L    07/25/2023 1.55 L      FVC-%Pred-Pre   Date Value Ref Range Status   02/07/2024 42 %    01/10/2024 39 %    08/29/2023 53 %    07/25/2023 55 %      FEV1-Pre   Date Value Ref Range Status   02/07/2024 1.13 L    01/10/2024 1.10 L    08/29/2023 1.43 L    07/25/2023 1.54 L      FEV1-%Pred-Pre   Date Value Ref Range Status   02/07/2024 51 %    01/10/2024 49 %    08/29/2023 64 %    07/25/2023 69 %        IMAGING    No results found for this or any previous visit (from the past 48 hour(s)).

## 2024-04-05 NOTE — PLAN OF CARE
ICU End of Shift Summary. See flowsheets for vital signs and detailed assessment.    Changes this shift: No acute changes. Room air during day. VSS. Walked in halls and up in chair. Tolerating regular diet and tube feeds.    Discussed need for PICC with provider. No indiciation for PICC, provider did not give the ok to remove.    Plan:  Transfer to floor when able. Possiblle remove PICC tomorrow. Encourage activity/OOB.    Goal Outcome Evaluation:      Plan of Care Reviewed With: patient    Overall Patient Progress: no changeOverall Patient Progress: no change    Outcome Evaluation: tx orders still in place

## 2024-04-05 NOTE — TELEPHONE ENCOUNTER
[facility] Call Center    Phone Message    May a detailed message be left on voicemail: yes     Reason for Call: Other: [facility] is calling to confirm that Dr. Triana received oxygen refill request that they sent over on 3/28/24. Please confirm and call them back at [phone] ext [phone].     Action Taken: Other: PULM    Travel Screening: Not Applicable

## 2024-04-05 NOTE — PROGRESS NOTES
"    GASTROENTEROLOGY PROGRESS NOTE  *SIGN OFF NOTE*    Date of Admission: 2/10/2024  Reason for Admission: Failure to Thrive    ASSESSMENT:  Sofie Rodriguez is a 63 year old female with a history of COPD s/p Lung Transplant in 2022 complicated by recurrent PNAs, EBV viremia, severe gastroparesis s/p GJ tube placement (07/2022), pyloric botox and G-POEM (10/9/2023), chronic diarrhea, recurrent c.diff colitis, ESRD on iHD (T/Th/Sat) who was initially admitted 2/10/24 for FTT/weight loss and initiation of TPN with course complicated by multiple ICU admissions for acute on chronic hypoxic and hypercarbic respiratory failure intermittently requiring intubation (most recently transferred to MICU on 3/24 with c/f for infection vs acute rejection) but now remains on intermittent Bipap(at HS)/RA during daytime.    Luminal GI team previously following after consulted 3/12 for possible colonoscopy for evaluation of chronic diarrhea. Now re-consulted 4/4 for \"Recurrent worsening of diarrhea, s/p C diff tx (POS on 3/12, fidaxomicin end 3/22), rechecking. Stool studies has shown osmotic diarrhea. Question if pt needs colonoscopy w/biopsy as this was the plan if diarrhea worsened again. Any additional recs\"     #S/p Lung Tx in 2022  #Afib (on Apixaban)  #History of SIBO/chronic osmotic diarrhea  #Acute on chronic diarrhea  #Recurrent c.diff colitis, s/p fidaxomicin treatment (completed 3/22)  Negative C.diff on 2/21/2024. Normal TSH recently, normal celiac serologies 05/2023. History of CMV viremia but most recent 02/19/2024 was negative. Currently on TPN, off tube feeds with persistent diarrhea. Previously thought to have  osmotic diarrhea with tube feeding and in the setting of small bowel dysmotility. Has a history of SIBO (on testing 9/2023) in the past treated with several antibiotic regimens (Cipro 500 BID x 14 days that did not improve sx, subsequently given Rifaximin which was seemingly helpful). Multiple conversations held " in the past with GI staff about this case, with very limited medical options for small bowel and possible pan-intestinal hypomotility from vagal nerve injury.  No endoscopic evaluation has been completed yet this adm.    Tested positive for C.diff on 3/12/2024 (positive PCR, Ag and toxin). Last positive PCR on 07/2022, but also had on 05/2023, both times treated with Vancomycin, the second time not improving well with Vancomycin. Alternatively completed 10 day course of Fidaxomicin (3/13-3/22) given suspected second (at least) recurrence.  Per review of flowsheets, having 1-4 loose stools prior to start of Fidaxomicin that not seem to significantly improved during course (recorded x1-8 loose stools/day) but possibly improved after completed course 3/22-3/28 (only doc 1-2 stools/day) but per patient's report her baseline stool frequency has been 6-8 stools/day and in the last 2 days worsened to x12 loose/watery stools and now with low bicarb warranting adjustment to bicarb in iHD runs by Nephrology and affecting acid/base stability.  Noted restarted abx (Zosyn 3/23-4/2 for pneumonia) and no ppx PO vanc given but recheck of C.diff PCR negative today (4/4) as well as enteric panel negative.    DDX for acute on chronic non-bloody diarrhea is expansive but mostly likely includes microscopic colitis versus medication-induced (recent abx, transplant medications toxicity) versus infectious (lower suspicion for recurrent C.diff given recheck PCR negative and diarrhea never significantly improved after fidaxomicin treatment, pending repeat enteric panel, possible suspicion for CMV/EBV given immunocompromised status post lung transplant).  Ongoing possibility for diarrhea related to malabsorptive/maldigestion etiology from SIBO given have not trailed repeat Rifaximin treatment this adm but lower suspicion for diarrhea solely related to osmotic effect from TF as worsening diarrhea despite same TF formula/rate.       Would rec  proceed with Colonoscopy to obtain bx to evaluate pseudomembranes post completion of C.diff treatment and bx to r/o other etiology above.  Given tenuous resp status and course, would ideally complete this under MAC and ensure patient and ICU teams are in agreement for temporary intubation if required post sedation/procedure (and not proceed directly to trach per pt's request based on previous ICU discussions).  Patient initially in agreement to colonoscopy on 4/4 but now refusing on 4/5 to proceed with colonoscopy given resp risk and possibly improving stools (x2 only so far).      #Gastroparesis s/p GJ tube placement, G-POEM without improvement  #Severe protein-calorie malnutrition  Regular diet with calorie counts in progress.  Receiving TF (Near Infinity Renal) @ 35 ml/hr.  Per patient, historically has never tried to infuse TF >70 ml/hr (for nocturnal feeds) and unclear max rate will tolerate when/if need to infuse prep via Jtube for colonoscopy.    RECOMMENDATIONS:  --Cancelled Colonoscopy under MAC given pt now refusing to proceed with reported improving stools and concern for possible compromise to resp status with procedural sedation/wants to avoid trach.  -- IF/WHEN patient agreeable to proceed with Colonosocpy anticipate will need to schedule with MAC.  ---IF/WHEN to proceed with Colonoscopy, will need to hold Apixaban for 4-5 days prior to procedure (given HD status per Pharmacy).  -- IF/WHEN to proceed with colonoscopy, would need to order the following:   - HOLD TF and change to CL diet (no red, purple or blue) the day prior prior to scheduled colonoscopy.  - Starting the day prior to scheduled colonoscopy at 12:00, begin 4 L Golytely via Jtube (instructions: infuse prep via Jtube starting @ 50 ml/hr and advance rate by 20 ml q 1 hrs until reach goal of 150-200 ml/hr or rate as patient tolerates pending c/o abd pain/N/V).  - If stools are not clear the day of colonoscopy, starting @ 04:00, begin additional  2L Golytely (instructions: infuse at previously tolerated rate via Jtube).  Will need to stop all prep by 2 hours prior to procedure.  *Can STOP prep once stools are clear (mountain dew, tea or broth in color).  - Please ensure multiple antiemetic options are available during prep.  - NPO status at midnight on day prior to colonoscopy except additional prep if needed.  - Offered to repeat empiric treatment for SIBO but patient refused 4/5 and would like to continue to monitor for stool improvement back towards baseline.  - Consider semi-elemental TF formula if possible (pending renal status)  - Optimize soluble fiber content in TF regimen per RD for bulking stools to slow transit.  - Resume PTA anti-diarrheal therapy (Imodium 2 mg TID to QID).    Discussed all above with patient, pulm transplant team and medicine team (jayden 3).    The patient was discussed and plan agreed upon with GI staff, Dr Stallworth.    GI Follow up (we will arrange):  TBD pending clinical course.    Overall time spent on the date of this encounter preparing to see the patient (including chart review of available notes, clinical status events, imaging and labs); obtaining interim history/subjective data; ordering and/or coordinating medications, tests or procedures; ordering medications, tests or procedures; communicating with other health care professionals; and documenting the above clinical information in the electronic medical record was 50 minutes.     The inpatient gastroenterology service will sign off at this time. Thank you for allowing us to participate in the care of this patient. Please do not hesitate to page the GI service with any questions or concerns or when/if desire to proceed with Colonoscopy inpatient vs refer for procedure outpatient.      Pauline Mccollum PA-C, RD, [facility]  Inpatient Gastroenterology Service  [facility]  Pager: *[phone]  Text Page    "  _______________________________________________________________      Subjective: Nursing notes and 24hr events reviewed.  Now on RA during the daytime, requiring Bipap at HS.     Patient seen and examined at 12:00.  Patient reports that has improving stool frequency so far today (only x2 loose/a little less watery) but documented x7 yesterday (4/4) and x9 (4/3).  Denies fever/chills, SOB, N/V.  Would like to hold off on colonoscopy now and monitor stools given concern for risk for possible reintubation with procedure.  Reports may have had temporary improvement in stool after previous course of Rifaximin but was not sustained and would like to hold off trial of repeat course of Rifaximin for empiric tx of SIBO as cause of diarrhea/malabsorption contributing to diarrhea/weight loss.    ROS:   4 pt ROS negative unless noted in subjective.     Objective:  Blood pressure (!) 146/57, pulse 81, temperature 97.6  F (36.4  C), temperature source Axillary, resp. rate 18, height 1.57 m (5' 1.81\"), weight 38.5 kg (84 lb 14 oz), SpO2 98%, not currently breastfeeding.    Gen: Sitting up in chair.  Pleasant and appears comfortable and in NAD.  HEENT: NCAT. Conjunctiva clear. Sclera anicteric.  CV: RRR, Peripheral perfusion intact  Resp: non-labored work of breathing on RA.  Abd: Soft, non-distended, non-tender to palpation in all quadrants, GJ tube with TF infusing via Jtube, Gtube clamped and site c/d/I.  Msk: no gross deformity  Skin:  no jaundice  Ext: warm, well perfused   Neuro: grossly normal  Mental status/Psych: A&O. Asks/answers questions appropriately     Date 04/04/24 0700 - 04/05/24 0659   Shift 4070-2344 1378-4483 6131-0012 24 Hour Total   INTAKE   P.O. 240   240   Other 0   0   NG/   210   Enteral 175   175   Shift Total(mL/kg) 625(15.26)   625(15.26)   OUTPUT   Other 2500   2500   Stool 350   350   Shift Total(mL/kg) 2850(69.58)   2850(69.58)   Weight (kg) 40.96 40.96 40.96 40.96     PROCEDURES:  No " endoscopy this adm.    LABS:  BMP  Recent Labs   Lab 04/05/24 0437 04/04/24 0320 04/03/24  0351 04/02/24  2305 04/02/24  0306    139 136  --  137   POTASSIUM 3.4 4.0 3.6  --  3.6   CHLORIDE 103 100 96*  --  94*   ESTUARDO 9.3 9.3 8.9  --  8.8   CO2 28 25 29  --  26   BUN 30.0* 46.2* 29.6*  --  62.5*   CR 2.87* 3.97* 2.56*  --  4.38*   * 100* 119* 107* 125*     CBC  Recent Labs   Lab 04/05/24 0437 04/04/24 0320 04/03/24 0351 04/02/24  0306   WBC 6.5 7.2 6.4 5.1   RBC 3.02* 2.98* 2.74* 2.60*   HGB 10.5* 10.1* 9.5* 8.8*   HCT 33.6* 32.7* 30.3* 28.5*   * 110* 111* 110*   MCH 34.8* 33.9* 34.7* 33.8*   MCHC 31.3* 30.9* 31.4* 30.9*   RDW 16.7* 16.4* 16.3* 16.1*    334 261 213     INR  Recent Labs   Lab 04/05/24 0437 04/04/24 0320 04/03/24  0351 04/02/24  0306   INR 1.10 1.17* 1.26* 1.35*     LFTs  Recent Labs   Lab 04/05/24 0437 04/04/24 0320 04/03/24  0351 04/02/24  0306   ALKPHOS 103 114 88 80   AST 15 17 15 13   ALT 12 14 13 10   BILITOTAL 0.2 0.2 0.2 0.2   PROTTOTAL 6.1* 6.1* 5.8* 5.4*   ALBUMIN 3.8 3.8 3.6 3.4*      PANCNo lab results found in last 7 days.      IMAGING:  (Personally reviewed)    2/7/2024: CT CHEST/ABDOMEN/PELVIS W CONTRAST, 2/7/2024 1:34 PM     TECHNIQUE: Helical CT images from the thoracic inlet through the  symphysis pubis were obtained with intravenous contrast. Contrast  dose: Isovue 370 62cc     COMPARISON: CT 1/10/2024     HISTORY: H/o bialtera lung transplant, severe GI issues, loss of 40  lbs in the last year, GI out of ideas, assess for other pathology; on  dialysis; Lung replaced by transplant (H)     Additional history from the electronic medical record: end stage COPD  s/p bilateral lung transplant on 6/28/22 complicated by acute limb  ischemia of LUE s/p left radial thrombectomy, h/o C. Diff, h/o EBV  viremia, ESRD on dialysis, hemobilia s/p ERCP presumably due to  hemorrhage into gallbladder, initially referred for gastroparesis s/p  PEGJ placement and  chronic diarrhea with +SIBO testing. S/p G-POEM on  10/9/23      FINDINGS:     Chest:   Lungs: The trachea and central airways are patent. No pneumothorax.  Trace bilateral pleural effusions, unchanged. Decreased size of  opacity in the subpleural posterior left upper lobe measuring  approximately 1.0 x 1.4 cm (4/51), previously 1.1 x 1.7 cm when  measured in similar fashion on 1/10/2024. New tree-in-bud nodularity  in the posterior right lower lobe (4/158). Lower lobe predominant  interstitial septal thickening.     Mediastinum: The visualized thyroid gland is unremarkable.  Postoperative changes of bilateral lung transplant. The heart size is  within normal limits. Small pericardial effusion. Moderate to severe  atherosclerotic calcification of the coronary arteries. Normal caliber  aorta. The main pulmonary artery is enlarged measuring up to 3.3 cm.  Normal appearance and configuration of the great vessels off of the  aortic arch. No suspicious mediastinal or hilar lymph nodes, although  evaluation is limited by diffuse mediastinal soft tissue edema. No  axillary adenopathy. Patulous esophagus.     Abdomen and pelvis:     Liver: No mass. No intrahepatic biliary ductal dilation.  Central  pneumobilia.     Biliary System: The gallbladder is decompressed which limits  evaluation. Small volume pneumobilia within the gallbladder and common  bile duct. Prominent appearance of the common bile duct measuring up  to 1.2 cm. No extrahepatic biliary ductal dilation.     Pancreas: No pancreatic ductal dilation. Similar few scattered  hypoattenuating lesions in the parenchyma in keeping with known IPMNs.        Adrenal glands: No mass or nodules     Spleen: Normal.     Kidneys: No suspicious mass, obstructing calculus or hydronephrosis.   Multiple nonobstructing calcified stones bilaterally.  Focal cortical  hypodensities, too small to characterize and statistically favored to  represent simple cysts.     Gastrointestinal  tract : Percutaneous gastrojejunostomy tube in  appropriate position. No dilation of small and large bowel loops.     Mesentery/peritoneum/retroperitoneum: Small volume ascites. Diffuse  mesenteric edema. No free air.     Lymph nodes: No significant lymphadenopathy.     Vasculature: Patent major abdominal vasculature.     Pelvis: Urinary bladder is fully decompressed which limits evaluation.     Osseous structures: No aggressive or acute osseous lesion.  Femoral  neck fixation screws. Clamshell sternotomy wires appear intact with  underlying nonunion of the sternum. Degenerative changes in the spine.      Soft tissues: Diffuse body wall edema.                                                                   IMPRESSION:   1. Nonspecific central intrahepatic and extrahepatic pneumobilia. This  appears new compared to 1/10/2024.  2. Percutaneous gastrojejunostomy tube in appropriate position. No  evidence of obstruction.   3. Decreased left upper lung opacities suggestive of improving  infection. New subtle tree-in-bud nodularity in the right lower lobe  which may represent infectious versus inflammatory process.  4. Findings of volume overload with mediastinal edema, generalized  body wall edema, mesenteric edema and small volume ascites.

## 2024-04-05 NOTE — PROGRESS NOTES
Care Management Follow Up    Length of Stay (days): 55    Expected Discharge Date: 04/09/2024     Concerns to be Addressed: discharge planning, other (see comments) (New TPN)     Patient plan of care discussed at interdisciplinary rounds: No    Anticipated Discharge Disposition: Transitional Care, Home Care, Dialysis Services     Anticipated Discharge Services: None  Anticipated Discharge DME: None    Patient/family educated on Medicare website which has current facility and service quality ratings:    Education Provided on the Discharge Plan: Yes  Patient/Family in Agreement with the Plan: yes    Referrals Placed by CM/SW: External Care Coordination  Private pay costs discussed: Not applicable    Additional Information:  Provider reached out to RNCC regarding patients need to have BiPAP at residence when discharged home.    RNCC requested provider place order, RNCC to follow up with ordering BiPAP as soon as order is placed.     UPDATE: Patient is already on BiPAP at home. Provider states patient needs to have BiPAP brought in from home so that RT can see if the settings can be changed to what they need to be.    UPDATE: 1600: RNCC called patients daughter, Julia and asked about her moms BiPAP at home. Julia states that patient no longer has a biPAP at home. RNCC notified provider to place order.       Bita Ramirez, RIYA    Nurse Coordinator     Covering for: Angela 4C and 4E    Phone: [phone]    Social Work and Care Management Department    SEARCHABLE in [facility] - search CARE COORDINATOR    [place] [address] [place] ([phone]) Saturday & Sunday; ([phone]) [facility] Recognized Holidays    Units: 5A, 5B & 5C  Pager: [phone]    Units: 6B, 6C & 6D    Pager: [phone]    Units: 7A, 7B, 7C & 7D    Pager: [phone]    Units: 6A & ICU   Pager: [phone]    Units: 5 Ortho, 5MS & WB ED Pager: [phone]    Units: 6MS, 8A & 10 ICU  Pager [phone]

## 2024-04-06 PROBLEM — Z94.2 S/P LUNG TRANSPLANT (H): Chronic | Status: ACTIVE | Noted: 2022-06-29

## 2024-04-06 LAB
ALBUMIN SERPL BCG-MCNC: 3.6 G/DL (ref 3.5–5.2)
ALP SERPL-CCNC: 110 U/L (ref 40–150)
ALT SERPL W P-5'-P-CCNC: 12 U/L (ref 0–50)
ANION GAP SERPL CALCULATED.3IONS-SCNC: 14 MMOL/L (ref 7–15)
AST SERPL W P-5'-P-CCNC: 14 U/L (ref 0–45)
BASE EXCESS BLDV CALC-SCNC: 1.4 MMOL/L (ref -3–3)
BASOPHILS # BLD AUTO: 0.1 10E3/UL (ref 0–0.2)
BASOPHILS # BLD AUTO: ABNORMAL 10*3/UL
BASOPHILS # BLD MANUAL: 0.2 10E3/UL (ref 0–0.2)
BASOPHILS NFR BLD AUTO: 1 %
BASOPHILS NFR BLD AUTO: ABNORMAL %
BASOPHILS NFR BLD MANUAL: 2 %
BILIRUB SERPL-MCNC: 0.2 MG/DL
BUN SERPL-MCNC: 60 MG/DL (ref 8–23)
CALCIUM SERPL-MCNC: 9.6 MG/DL (ref 8.8–10.2)
CHLORIDE SERPL-SCNC: 102 MMOL/L (ref 98–107)
CREAT SERPL-MCNC: 4.78 MG/DL (ref 0.51–0.95)
CYCLOSPORINE BLD LC/MS/MS-MCNC: 135 UG/L (ref 50–400)
DEPRECATED HCO3 PLAS-SCNC: 26 MMOL/L (ref 22–29)
EGFRCR SERPLBLD CKD-EPI 2021: 10 ML/MIN/1.73M2
EOSINOPHIL # BLD AUTO: 0.6 10E3/UL (ref 0–0.7)
EOSINOPHIL # BLD AUTO: ABNORMAL 10*3/UL
EOSINOPHIL # BLD MANUAL: 0.7 10E3/UL (ref 0–0.7)
EOSINOPHIL NFR BLD AUTO: 7 %
EOSINOPHIL NFR BLD AUTO: ABNORMAL %
EOSINOPHIL NFR BLD MANUAL: 9 %
ERYTHROCYTE [DISTWIDTH] IN BLOOD BY AUTOMATED COUNT: 17.2 % (ref 10–15)
ERYTHROCYTE [DISTWIDTH] IN BLOOD BY AUTOMATED COUNT: 17.4 % (ref 10–15)
GLUCOSE SERPL-MCNC: 114 MG/DL (ref 70–99)
HCO3 BLDV-SCNC: 30 MMOL/L (ref 21–28)
HCT VFR BLD AUTO: 30.7 % (ref 35–47)
HCT VFR BLD AUTO: 31.6 % (ref 35–47)
HGB BLD-MCNC: 9.5 G/DL (ref 11.7–15.7)
HGB BLD-MCNC: 9.8 G/DL (ref 11.7–15.7)
IMM GRANULOCYTES # BLD: 0.2 10E3/UL
IMM GRANULOCYTES # BLD: ABNORMAL 10*3/UL
IMM GRANULOCYTES NFR BLD: 3 %
IMM GRANULOCYTES NFR BLD: ABNORMAL %
INR PPP: 1.11 (ref 0.85–1.15)
LYMPHOCYTES # BLD AUTO: 1.1 10E3/UL (ref 0.8–5.3)
LYMPHOCYTES # BLD AUTO: ABNORMAL 10*3/UL
LYMPHOCYTES # BLD MANUAL: 0.8 10E3/UL (ref 0.8–5.3)
LYMPHOCYTES NFR BLD AUTO: 14 %
LYMPHOCYTES NFR BLD AUTO: ABNORMAL %
LYMPHOCYTES NFR BLD MANUAL: 11 %
MAGNESIUM SERPL-MCNC: 2 MG/DL (ref 1.7–2.3)
MCH RBC QN AUTO: 34.7 PG (ref 26.5–33)
MCH RBC QN AUTO: 35.1 PG (ref 26.5–33)
MCHC RBC AUTO-ENTMCNC: 30.9 G/DL (ref 31.5–36.5)
MCHC RBC AUTO-ENTMCNC: 31 G/DL (ref 31.5–36.5)
MCV RBC AUTO: 112 FL (ref 78–100)
MCV RBC AUTO: 113 FL (ref 78–100)
MONOCYTES # BLD AUTO: 0.7 10E3/UL (ref 0–1.3)
MONOCYTES # BLD AUTO: ABNORMAL 10*3/UL
MONOCYTES # BLD MANUAL: 0.5 10E3/UL (ref 0–1.3)
MONOCYTES NFR BLD AUTO: 9 %
MONOCYTES NFR BLD AUTO: ABNORMAL %
MONOCYTES NFR BLD MANUAL: 6 %
NEUTROPHILS # BLD AUTO: 5.1 10E3/UL (ref 1.6–8.3)
NEUTROPHILS # BLD AUTO: ABNORMAL 10*3/UL
NEUTROPHILS # BLD MANUAL: 5.5 10E3/UL (ref 1.6–8.3)
NEUTROPHILS NFR BLD AUTO: 66 %
NEUTROPHILS NFR BLD AUTO: ABNORMAL %
NEUTROPHILS NFR BLD MANUAL: 72 %
NRBC # BLD AUTO: 0 10E3/UL
NRBC # BLD AUTO: 0 10E3/UL
NRBC BLD AUTO-RTO: 0 /100
NRBC BLD AUTO-RTO: 0 /100
O2/TOTAL GAS SETTING VFR VENT: 30 %
OXYHGB MFR BLDV: 68 % (ref 70–75)
PCO2 BLDV: 72 MM HG (ref 40–50)
PH BLDV: 7.23 [PH] (ref 7.32–7.43)
PHOSPHATE SERPL-MCNC: 4.8 MG/DL (ref 2.5–4.5)
PLAT MORPH BLD: ABNORMAL
PLATELET # BLD AUTO: 308 10E3/UL (ref 150–450)
PLATELET # BLD AUTO: 321 10E3/UL (ref 150–450)
PO2 BLDV: 42 MM HG (ref 25–47)
POLYCHROMASIA BLD QL SMEAR: SLIGHT
POTASSIUM SERPL-SCNC: 4.2 MMOL/L (ref 3.4–5.3)
PROT SERPL-MCNC: 5.7 G/DL (ref 6.4–8.3)
RBC # BLD AUTO: 2.74 10E6/UL (ref 3.8–5.2)
RBC # BLD AUTO: 2.79 10E6/UL (ref 3.8–5.2)
RBC MORPH BLD: ABNORMAL
SAO2 % BLDV: 69.1 % (ref 70–75)
SODIUM SERPL-SCNC: 142 MMOL/L (ref 135–145)
TME LAST DOSE: NORMAL H
TME LAST DOSE: NORMAL H
WBC # BLD AUTO: 7.6 10E3/UL (ref 4–11)
WBC # BLD AUTO: 7.8 10E3/UL (ref 4–11)

## 2024-04-06 PROCEDURE — 250N000013 HC RX MED GY IP 250 OP 250 PS 637

## 2024-04-06 PROCEDURE — 90935 HEMODIALYSIS ONE EVALUATION: CPT

## 2024-04-06 PROCEDURE — 94660 CPAP INITIATION&MGMT: CPT

## 2024-04-06 PROCEDURE — 99233 SBSQ HOSP IP/OBS HIGH 50: CPT | Performed by: NURSE PRACTITIONER

## 2024-04-06 PROCEDURE — 90935 HEMODIALYSIS ONE EVALUATION: CPT | Performed by: INTERNAL MEDICINE

## 2024-04-06 PROCEDURE — 82805 BLOOD GASES W/O2 SATURATION: CPT

## 2024-04-06 PROCEDURE — 80158 DRUG ASSAY CYCLOSPORINE: CPT | Performed by: NURSE PRACTITIONER

## 2024-04-06 PROCEDURE — 85007 BL SMEAR W/DIFF WBC COUNT: CPT

## 2024-04-06 PROCEDURE — 250N000013 HC RX MED GY IP 250 OP 250 PS 637: Performed by: INTERNAL MEDICINE

## 2024-04-06 PROCEDURE — 250N000013 HC RX MED GY IP 250 OP 250 PS 637: Performed by: STUDENT IN AN ORGANIZED HEALTH CARE EDUCATION/TRAINING PROGRAM

## 2024-04-06 PROCEDURE — 80053 COMPREHEN METABOLIC PANEL: CPT

## 2024-04-06 PROCEDURE — 999N000157 HC STATISTIC RCP TIME EA 10 MIN

## 2024-04-06 PROCEDURE — 634N000001 HC RX 634: Mod: JZ | Performed by: HOSPITALIST

## 2024-04-06 PROCEDURE — 250N000012 HC RX MED GY IP 250 OP 636 PS 637: Performed by: NURSE PRACTITIONER

## 2024-04-06 PROCEDURE — 85610 PROTHROMBIN TIME: CPT | Performed by: STUDENT IN AN ORGANIZED HEALTH CARE EDUCATION/TRAINING PROGRAM

## 2024-04-06 PROCEDURE — 83735 ASSAY OF MAGNESIUM: CPT | Performed by: STUDENT IN AN ORGANIZED HEALTH CARE EDUCATION/TRAINING PROGRAM

## 2024-04-06 PROCEDURE — 250N000012 HC RX MED GY IP 250 OP 636 PS 637

## 2024-04-06 PROCEDURE — 85025 COMPLETE CBC W/AUTO DIFF WBC: CPT

## 2024-04-06 PROCEDURE — 258N000003 HC RX IP 258 OP 636: Performed by: HOSPITALIST

## 2024-04-06 PROCEDURE — 99233 SBSQ HOSP IP/OBS HIGH 50: CPT | Mod: GC | Performed by: HOSPITALIST

## 2024-04-06 PROCEDURE — 120N000002 HC R&B MED SURG/OB UMMC

## 2024-04-06 PROCEDURE — 84100 ASSAY OF PHOSPHORUS: CPT | Performed by: STUDENT IN AN ORGANIZED HEALTH CARE EDUCATION/TRAINING PROGRAM

## 2024-04-06 RX ADMIN — Medication 40 MG: at 12:41

## 2024-04-06 RX ADMIN — SEVELAMER CARBONATE 0.8 G: 800 POWDER, FOR SUSPENSION ORAL at 20:18

## 2024-04-06 RX ADMIN — CYCLOSPORINE 100 MG: 100 SOLUTION ORAL at 12:41

## 2024-04-06 RX ADMIN — Medication 2.5 MCG: at 20:18

## 2024-04-06 RX ADMIN — APIXABAN 2.5 MG: 2.5 TABLET, FILM COATED ORAL at 20:19

## 2024-04-06 RX ADMIN — MIDODRINE HYDROCHLORIDE 10 MG: 5 TABLET ORAL at 07:33

## 2024-04-06 RX ADMIN — SODIUM CHLORIDE 250 ML: 9 INJECTION, SOLUTION INTRAVENOUS at 07:52

## 2024-04-06 RX ADMIN — Medication 40 MG: at 20:18

## 2024-04-06 RX ADMIN — METOPROLOL TARTRATE 25 MG: 25 TABLET, FILM COATED ORAL at 20:18

## 2024-04-06 RX ADMIN — OLANZAPINE 5 MG: 5 TABLET, ORALLY DISINTEGRATING ORAL at 22:32

## 2024-04-06 RX ADMIN — EPOETIN ALFA-EPBX 4000 UNITS: 10000 INJECTION, SOLUTION INTRAVENOUS; SUBCUTANEOUS at 10:06

## 2024-04-06 RX ADMIN — SODIUM CHLORIDE 300 ML: 9 INJECTION, SOLUTION INTRAVENOUS at 07:52

## 2024-04-06 RX ADMIN — AMIODARONE HYDROCHLORIDE 200 MG: 200 TABLET ORAL at 12:41

## 2024-04-06 RX ADMIN — LEVOTHYROXINE SODIUM 25 MCG: 0.03 TABLET ORAL at 12:41

## 2024-04-06 RX ADMIN — APIXABAN 2.5 MG: 2.5 TABLET, FILM COATED ORAL at 16:01

## 2024-04-06 RX ADMIN — SEVELAMER CARBONATE 0.8 G: 800 POWDER, FOR SUSPENSION ORAL at 12:00

## 2024-04-06 RX ADMIN — CALCIUM CARBONATE 600 MG (1,500 MG)-VITAMIN D3 400 UNIT TABLET 1 TABLET: at 12:41

## 2024-04-06 RX ADMIN — LIDOCAINE 4% 1 PATCH: 40 PATCH TOPICAL at 20:19

## 2024-04-06 RX ADMIN — LOPERAMIDE HCL 4 MG: 1 SOLUTION ORAL at 20:27

## 2024-04-06 RX ADMIN — Medication: at 07:52

## 2024-04-06 RX ADMIN — PREDNISONE 5 MG: 5 TABLET ORAL at 12:41

## 2024-04-06 RX ADMIN — Medication 2.5 MCG: at 12:41

## 2024-04-06 RX ADMIN — CYCLOSPORINE 100 MG: 100 SOLUTION ORAL at 18:39

## 2024-04-06 RX ADMIN — CALCIUM CARBONATE 600 MG (1,500 MG)-VITAMIN D3 400 UNIT TABLET 1 TABLET: at 18:39

## 2024-04-06 RX ADMIN — CALCIUM CARBONATE 600 MG (1,500 MG)-VITAMIN D3 400 UNIT TABLET 1 TABLET: at 16:01

## 2024-04-06 RX ADMIN — CLONIDINE HYDROCHLORIDE 0.1 MG: 0.1 TABLET ORAL at 22:32

## 2024-04-06 RX ADMIN — SULFAMETHOXAZOLE AND TRIMETHOPRIM 1 TABLET: 400; 80 TABLET ORAL at 20:18

## 2024-04-06 ASSESSMENT — ACTIVITIES OF DAILY LIVING (ADL)
ADLS_ACUITY_SCORE: 33

## 2024-04-06 NOTE — PROGRESS NOTES
[facility]    Progress Note - Medicine Service, NILE TEAM 3       Date of Admission:  2/10/2024    Assessment & Plan   Sofie Rodriguez is a 63 year old female with PMH COPD s/p bilateral lung transplant 6/28/22 c/b hemidiaphragm palsy and recurrent pneumonias, gastroparesis and small bowel dysmotility complicated by severe malnutrition now s/p PEG/J, ESRD on M/W/F HD, recent R femoral fx s/p ORIF, chronic diarrhea, recurrent c-diff, failure to thrive with inability to tolerate any tube feeds, who was admitted to [facility] on 2/10/24 for concerns over malnutrition and TPN initiation via portacath. Course complicated by recurrent and progressive acute on chronic hypoxic and hypercarbic respiratory failure requiring multiple ICU admissions and intubation 2/18-2/19, 2/28-2/29, 3/18-3/20, for continuous BiPAP. Again with worsening respiratory acidosis and hypercarbia, concern for infection vs acute rejection, requiring transfer back to ICU 3/20/2024 for intubation and bronchoscopy. Weaned off ventilator to RA and BIPAP at night. Transferred to medicine on 4/4/2024.     Changes today:   -Discontinue PICC  -Discontinue Enteric precautions now that C.Diff negative  -Continue on imodium for diarrhea  -Discussed with SW home NIPPV orders, may not be completed until early the following week  -Per Pulm, changing her BIPAP settings to AVAPs 350mls Tv given hypercarbia above goals on 4/6/2024    # Acute on chronic hypoxic and hypercarbic respiratory failure requiring intubation 2/17-2/19 3/25-4/2, improved  # Recurrent PNAs, concern for acute infection vs acute rejection  # S/p BSLT 6/8/22 for COPD  # R hemidiaphragm palsy   # Positive DSA   Hx bilateral lung transplant on 6/28/2022 for COPD.  Initially admitted on 2/10/2024 for initiation of TPN.  However she was admitted to the ICU on 2/17/2024 for hypoxic hypercarbic respiratory failure.  She was intubated on 2/18 -  2/19 due to pulmonary edema vs infection.  Was extubated onto BiPAP/AVAPS with improvement in mentation however continued to have respiratory acidosis.She was transferred to medicine floor on 2/29.  Was transferred back to the ICU on 3/18 - 3/20 due to hypercarbia and severe respiratory acidosis that did not require intubation.  Patient again experience worsening respiratory acidosis and hypercarbia will concern for infection VS acute rejection that required transfer back to ICU on 3/20/2024 for intubation and bronch.  She was weaned off the ventilator to room air and BiPAP at night.  Transferred to medicine on 4//2024.  She has had imaging to evaluate neurologic causes of decreased respiratory drive, without any notable findings.  Mentation remained stable despite low pH and high pCO2.  Volume status has improved with hemodialysis.  Overall her pCO2 retention is thought to be multifactorial, with a component of overfeeding through TPN, infection, bicarb loss with diarrhea, and NIPPV intolerance due to claustrophobia.  - Daily VBG, stable on 4/5/2024  - Transplant pulm following, appreciate recs  - Immunosuppression:   >Prednisone 5 mg every morning, 2.5 mg every afternoon  >Cyclosporine 100mg BID (Stopped tacrolimus 3/10)   >Overnight oximetry study suggestive of O2 vs CPAP need, as did require up to 2LPM overnight to prevent hypoxia  >Minimize O2 to preserve respiratory drive, given IVIG for DSA+   >D/c'd theophylline 3/3/24 due to difficulty attaining therapeutic levels (started by ICU), could consider Modafinil   >Repeat metabolic CART study ordered by Transplant Pulm on 4/5/2024  - Airway clearance/nebs:  --> resume BID scheduled if secretions worsen/thicken  - Xopenex neb BID PRN  - Mucomyst stopped 4/4   - Volume removal with iHD  - Limit medications that would depress respiration  - BIPAP at hs and naps at all times. Ideally 7-8 hours overnight, limited by claustrophobia, medications as below  - Zyprexa 5mg  at bedtime   - Atarax 25 mg TID PRN for anxiety  - Clonidine 0.1 mg at bedtime  - High risk for reintubation given hx of hypercarbia   - Will need bipap machine for home prior to discharge    L Ear pain and muffled hearing  Rhinorrhea  Patient notes 3 days of L ear pain and mild rhinorrhea without cough or worsening SOB over the last 2-3 days. No systemic signs of new infection. No discharge from L ear on exam. Moderate ear wax burden. No bleeding or erythema. Low concern for acute otitis media. Could be a viral URI vs discomfort from using BIPAP/AVAPs more consistently leading to pressure imbalance across her tympanic membrane.   - CTM for now     # Goals of Care  - 3/15 Care conference on with Transplant Pulm, Pall Care, Medicine, daughters (Julia Nash) and Transplant SW. Overall medical updates provided and Qs answered. With declining respiratory acidosis on labs, discussed code status 3/18. Patient initially not desiring any escalation of her care to ICU/intubation with frustration re overall course. However, after discussing with her daughter on the phone she and family elected to remain full code and agreed to ICU admission and intubation if necessary.   - 3/29 Care conference: Laid out a few options for family and patient to consider with qs answered. Examples being we could attempt to extubate to BiPAP but plan should be established prior to extubation that if patient decompensates and requires reintubation then likely pursue trach versus more comfort approach. Other option is going straight for trach now. Patient and family (daughter Julia and son present on Ipad) considering the options and had a lot of questions about trach and what that would look like long-term, which was laid out for the family and patient.   - Pt made the decision this hospitalization, that if needed down the line she would be acceptable of a tracheostomy      # Chronic osmotic diarrhea/SIBO s/p Rifaximin  # Recurrent C.Diff (3rd  ep 3/12/24)    # Diarrhea  Recurrent C.diff 3/12, Fidaxomicin x 10 days (3/13-3/22), with improvement in diarrhea. Transplant pulm requesting repeat colonoscopy w/ Bx after completion of c.diff treatment, to r/o toxicity or other reason that diarrhea is present.  C.diff and enteric panel on 4/4/2024 was unremarkable.  Patient initially agreeable to colonoscopy, however later noted that she would like to defer colonoscopy for concerns regarding her weight and reintubation for the procedure.   - GI consult for recurrent diarrhea in the MICU, appreciate recs   - C.diff and enteric panel negative  - Confirmed Osmotic diarrhea with stool studies--> Likely not infectious, continue adjusting TF and loperamide PRN  - Bowel regimen PRN  - Imodium PRN     # Severe malnutrition   # Failure to thrive  # Hypoalbuminemia  # Gastroparesis, small bowel dysmotility  # S/P PEG/J with intolerance of enteral nutrition  Patient with gastroparesis (presumed due to vagal injury) and small bowel dysmotility complicated by unintentional 40lb weight loss over the past year and now severe malnutrition. Previously intolerant to oral food intake due to nausea. Was initially admitted for portacath and TPN initiation since 2/13. Intermittently tolerating feeds without n/v from 2/20.  Per GI, no ongoing concerns for SIBO as of 2/23. As of 3/11 transplant pulmonology is worried that TPN is not a realistic plan to continue at home and transitioned back to TF (RD oncerned pt is not absorbing any oral intake). TPN discontinued 3/16. Transplant pulm also worried about mucosal toxicity.   - Metabolic cart 4/5/24 following 24 hours of calorie counts   - Nutrition consulted, appreciate assistance  - Continue TF at goal rate 35 ml/hr via PEG/J  - Speech saw pt 4/3 okay for regular diet with thin liquids; will continue pt tube feeds via pts g/j tube (hx of gastroparesis requiring TPN) until pt able to take in adequate PO nutrition            # Recurrent  pneumonia, concern for acute infection vs rejection  # Recurrent C.Diff colitis (3rd ep 3/12/24)  # Hx EBV viremia   # Hypogammaglobulinemia  # Chronic immunosuppression / lung transplant  Empirically treated for pneumonia  - , RVP and cultures no growth to date. Recurrent C.Diff Positive 3/12, s/p PO Fidaxomicin 200 mg BID for 10 days (3/13-3/22) with improvement in diarrhea. Remains afebrile, leukocytosis resolved.  Ig, s/p IVIG.  EBV 27K (decreased compared to prior).     - Follow up BAL and blood cultures from 3/24    Antibiotics:  Zosyn ( - , 3/24 - )   Bactrim (MWF, PJP ppx, previously on Dapsone)  Vancomycin ( - , 3/24-3/25)  Micafungin (- , 3/24 x1)  Fidaxomicin (3/13-3/22)     Micro:   - BAL 3/24:   - Cell count w diff: PMN 75%, lymphocytes 5, monocytes 19, eos 1  - No organisms seen on Gram stain  - RVP, HSV, Histoplasma neg  - Fungal & bacterial cultures NGTD  - EBV, CMV, PJP, Aspergillus, Legionella, Mycoplasma, AFB in process  - Bcx 3/24 NGTD    # A-flutter (recurrent on 3/17)  # A-fib, intermittent  # HTN  # HFpEF  Noted Afib/flutter intermittently throughout admission. Was started on amiodarone bolus with drip and transitioned to PO dosing.   TTE: LVEF 55-60%, global RV function normal, no significant valvular abnormalities. Briefly required levophed and midodrine for HD but otherwise stable off pressors.   - MAP goal > 65 mmHg   - Continue Metoprolol tartrate dose 25 mg BID (hold parameters if MAP<65 or hr less than 60, hold on dialysis days)  - Continue amiodarone 200 mg PO  - Midodrine with HD as needed  - Restarted Apixaban now that patient not wanting colonoscopy     # Hypothyroidism  Patient with new (24) low T4 at 0.64 and TSH at 6.5, concerning for new hypothyroidism vs sick thyroid syndrome. TSH with appropriate response, more consistent with elevation in the setting of acute illness. ICU team on  recommended initiation of  treatment for possible hypothyroid as this can improve respiratory muscle function in the setting of weakness and malnutrition. 3/7, 3/15, 3/20 repeat thyroid function WNL.  - Continue liothyronine + levothyroxine -- note that prolonged combination therapy is not optimal & regimen should be re-evaluated (likely stop liothyronine, up-titrate levothyroxine) in post-acute setting    # Hx of Anxiety   # Claustrophobia  Reports claustrophobia contributing to limited compliance with BiPAP. Has seen health psych on 3/20, recommended grounding exercise (5-4-3-2-1) for use on BiPAP.   - Zyprexa 5mg at bedtime   - Atarax 25 mg TID PRN for anxiety  - Clonidine 0.1 mg at bedtime    Chronic issues      # ESRD on HD TTS   # Mild hyperphosphatemia  Patient is ESRD on T/Th/Sat HD as outpt, now approx M/W/F inpatient. HD tolerating until 3/23. Briefly required extra Monday runs, not since being off TPN. She would prefer longer sessions to more days.  - Midodrine 10mg BID PRN with dialysis days   - Discuss with nephrology if there are additional strategies to optimize bicarb   - Sevelamer per Nephrology   - CBC and CMP daily  - Strict I/Os  - Daily weight   - Renally adjust medications     #Acute on chronic anemia 2/2 ESRD  Hgb stable, with no acute signs of bleeding.   - Daily CBC  - Transfuse for Hgb < 7  - Continue Epogen with dialysis, held in setting of concern for acute infection   - Continue Venofer 50 mcg qweek with dialysis, held in setting of concern for acute infection    # Suspected CARLEE  # PTA nocturnal O2, 2L   - Sleep clinic eval at discharge for suspected CARLEE    # GERD  - PPI BID    # Hyperglycemia, stress induced  Has been euglycemic for the past few days. Not on insulin.  - Hypoglycemia protocol     Diet: Regular Diet Adult  Adult Formula Drip Feeding: Continuous Hundo Renal Support; Jejunostomy; Goal Rate: 35 mL/hr (goal rate) held for 1 hour before/after Synthroid administration, per PharmD; mL/hr; Resume TF @  goal 35 ml/hr  Snacks/Supplements Adult: Other; Ice cream shakes BID, flavor per pt.; Between Meals    DVT Prophylaxis: DOAC  Dong Catheter: Not present  Fluids: None  Lines: PRESENT      PICC 03/04/24 Double Lumen Right Basilic TPN. PICC okay to use.-Site Assessment: WDL  Hemodialysis Vascular Access Arteriovenous graft Superior Arm-Site Assessment: WDL;Bruit present;Thrill present      Cardiac Monitoring: None  Code Status: Full Code      Clinically Significant Risk Factors              # Hypoalbuminemia: Lowest albumin = 2.6 g/dL at 2/18/2024  5:13 AM, will monitor as appropriate               # Severe Malnutrition: based on nutrition assessment    # Financial/Environmental Concerns: none         Disposition Plan   Pending medical stability and scope with GI      The patient's care was discussed with the Attending Physician, Dr. Pantoja .    DELFIN LEDESMA MD  Medicine Service, [address]  Securely message with [employer] (more info)  Text page via [facility] Paging/Directory   See signed in provider for up to date coverage information  ______________________________________________________________________    Interval History   No acute events overnight.  Patient notes she tolerated BiPAP well, noting that she felt like she slept better.  Was seen in the dialysis unit.  Denies any new concerns.  Still noted some muffled hearing with mild pain in her left ear.  She noted some improved rhinorrhea that she experienced yesterday.  Denies any fevers or chills.  Denies any cough.  No new chest pains or shortness of breath at rest.  No abdominal pains.  No new lower extremity edema.     Physical Exam   Vital Signs: Temp: 97.8  F (36.6  C) Temp src: Oral BP: (!) 144/53 Pulse: 73   Resp: 13 SpO2: 99 % O2 Device: None (Room air)    Weight: 91 lbs 1.6 oz    General: Awake, alert & oriented. Frail appearing. Laying in dialysis unit  HEENT: Mucous membranes moist  Neuro: Awake and  alert, no focal deficits, moving all extremities to command and spontaneously  Pulm/Resp: Clear breath sounds bilaterally, diminished on R>L, no accessory muscle use  CV: RRR, S1/S2 without m/r/g; pedal pulses 2+ without LE edema  Abdomen: Soft, non-distended, non-tender  : Rectal tube in place, (+) bruit/thrill in LUE fistula  Incisions/Skin: PICC and PEG site without surrounding erythema, no rashes noted    Medical Decision Making       Please see A&P for additional details of medical decision making.      Data   ------------------------- PAST 24 HR DATA REVIEWED -----------------------------------------------    I have personally reviewed the following data over the past 24 hrs:    7.6  \   9.5 (L)   / 321     142 102 60.0 (H) /  114 (H)   4.2 26 4.78 (H) \     ALT: 12 AST: 14 AP: 110 TBILI: 0.2   ALB: 3.6 TOT PROTEIN: 5.7 (L) LIPASE: N/A     INR:  1.11 PTT:  N/A   D-dimer:  N/A Fibrinogen:  N/A       Imaging results reviewed over the past 24 hrs:   No results found for this or any previous visit (from the past 24 hour(s)).

## 2024-04-06 NOTE — PROGRESS NOTES
Date: 4/6/2024  Time: 1130     Data:    Pre Wt:   38.5 kg as of 4/4/24  Desired Wt:   To be established  Post Wt:  36 kg (estimated)  Weight change: - 2.5 kg  Ultrafiltration - Post Run Net Total Removed (mL):  2500 ml  Vascular Access Status: Graft patent  Dialyzer Rinse:  Light  Total Blood Volume Processed: 76.73 L   Total Dialysis (Treatment) Time:   3.5 Hrs  Dialysate Bath: K 3, Ca 2.25  Heparin: Heparin: None     Lab:   Yes    CBC    Interventions:    Dialysis done through L AV Fistula using 15 gauge needles  UF set to 2.5 Liters, accommodating priming and rinse back volumes  ,     See MAR for meds given    See Flowsheet for Crit Profile throughout the run  Decannulation done post HD, hemostasis is achieved in 10 minutes  Pressure dressing is applied, to be removed after 4-6 hours  Report given to PCN, sent back to her room in stable condition     Assessment:    A/O x 4, calm VSS stable, on rooand cooperative, denies pain  L arm AV Fistula has good thrill and bruit                Plan:    Per Renal team        Brandee Clark, BSN, RN  Acute Dialysis RN  [facility]

## 2024-04-06 NOTE — PROGRESS NOTES
Care Management Follow Up    Length of Stay (days): 56    Expected Discharge Date: 04/09/2024     Concerns to be Addressed: discharge planning, other (see comments) (New TPN)     Patient plan of care discussed at interdisciplinary rounds: Yes    Anticipated Discharge Disposition: Transitional Care, Home Care, Dialysis Services     Anticipated Discharge Services: None  Anticipated Discharge DME: None    Patient/family educated on Medicare website which has current facility and service quality ratings:    Education Provided on the Discharge Plan: Yes  Patient/Family in Agreement with the Plan: yes    Referrals Placed by CM/SW: External Care Coordination  Private pay costs discussed: Not applicable    Additional Information:    Writer received a page from [facility] requesting following on the pt's new home BiPAP order for the pt who anticipate discharge mid next week. Per chart review, the pt had home O2 supply with [facility]. She had a sleep study done in August 2023.     Writer called [facility] and spoke with Kalpana. She verified that the pt had non invasive ventilator with them at previous period from 07/10/2018 to 10/12/2022. She never have home BiPAP with [facility]. She is currently using O2 supply from [facility].    To get a new home BiPAP for the pt, on Monday morning 4/8, CM needs to fax the new BiPAP order to Kalpana at [facility]. Once the fax goes through, call Kalpana to follow up.    Discharge Resources:    [facility] (P: [phone]; F: [phone])  Chair time of 11:45 AM 4x/week dialysis (3 hrs TTS and 2 hrs Monday's)      [facility] (P: [phone])     [facility] home oxygen F: [phone]  P: [phone]  Home O2  [facility], P: [phone]  F: [phone]    Rosa Ovalles RN    4/6/2024  Nurse Coordinator      Social Work and Care Management Department     SEARCHABLE in [facility] - search CARE COORDINATOR     [place] ([phone]) Saturday & Sunday; ([phone]) [facility] Recognized  Holidays     Units: 7A SOT RNCC Vocera, 7B Med Surg Vocera, 7C Med Surg 2001 thru [phone] RNCC & 7C Med Surg [id number] thru 1968 RNCC Pager: [phone]

## 2024-04-06 NOTE — PROGRESS NOTES
Nephrology Dialysis visit 4/6/24    This patient was seen and examined while on dialysis.  Patient is tolerating HD well. She denies any worsening dyspnea. Laboratory results and nurses' notes were reviewed.  No changes to management of volume, anemia, BMD, acidosis, or electrolytes  Diagnosis - ESRD on HD   Pauline Pimetnel MD

## 2024-04-06 NOTE — PROGRESS NOTES
"Calorie Count  Intake recorded for: 4/5  Total Kcals: 1188 Total Protein: 35g  Kcals from Hospital Food: 854   Protein: 32g  Kcals from Outside Food (average):334 Protein: 3g  # Meals Ordered from Kitchen: 3  # Meals Recorded: 2 + outside food  (1: 100% 2-nino slices, home fried potatoes, scrambled eggs)  (2: 100% 1 slice white bread, ice cream, jello; 50% regular Lay's)  (Outside food: 100% 3 thin mint cookies, \"Everything But...\" cookie)  # Supplements Recorded: 0    "

## 2024-04-06 NOTE — PROGRESS NOTES
Pulmonary Medicine  Cystic Fibrosis - Lung Transplant Team  Progress Note  2024     Patient: Sofie Rodriguez  MRN: [id number]  : 1960 (age 63 year old)  Transplant: 2022 (Lung), POD#[id number]  Admission date: 2/10/2024    Assessment & Plan:     Sofie Rodriguez is a 63 year old female with h/o COPD s/p BSLT () with course complicated by post-operative hemidiaphragm palsy, recurrent PNAs, positive DSA, EBV viremia, hypogammaglobulinemia, severe gastroparesis s/p G/J tube placement (22) and pyloric botox (23), GI bleed /2 pyloric ulcer, hemobilia s/p ERCP and MRCP, chronic diarrhea, recurrent C diff colitis, ESRD on iHD, recurrent falls with injuries (recent right hip fx s/p ORIF 2023), deconditioning, and FTT.  Admitted 2/10 for failure to thrive and initiation of TPN/lipids.  Emergent intubation - for hypoxic and hypercapneic respiratory failure and encephalopathy. Returned to ICU - and again 3/18-3/20 d/t tenuous respiratory status complicated by variable daytime NIPPV tolerance and hypervolemia with iHD limited d/t hypotension. Again transferred back to ICU 3/24 for intubation and bronch/BAL given hypoxic and hypercapneic respiratory failure. CT with extensive bilateral infiltrate and etiology likely multifactorial including volume overload and infection, possible mucous plugging, ACR or AMR less likely.  Extubated , no daytime hypoxia and hypercapnia remains stable with good adherence to NIPPV with sleep.  Tolerating PO but with persistent diarrhea, improving some.  Discharge pending stability on home NIPPV machine, dispo per therapy.      Today's recommendations:  - Daily VBG, goal for permissive hypercapnea to mid 60's  - Transition today from BiPAP 14/7 to AVAPS  ml with 12 minimum mmHg and EPAP 7 (messaged RT)  - Strict adherence to NIPPV overnight and with all naps, primary team to obtain home NIPPV machine and work to transition to long-term device  while inpatient  - Repeat CXR 4/8 (ordered)  - CSA level today therapeutic, no dose adjustment, repeat level 4/9 (ordered)  - DSA and EBV due 4/23  - Calorie counts continue, start BID supplemental ice cream shakes (ordered for you)     Acute on chronic hypoxic/hypercapneic respiratory failure:  S/p bilateral sequential lung transplant for COPD:   Hypoventilation, Suspected CARLEE:  PFTs in clinic remained significantly below her baseline.  Baseline hypoxia with 2L NC overnight PTA.  S/p intubation 2/17-2/19 for acute hypoxic/hypercapneic respiratory failure with encephalopathy, CT with concern for infection and pulmonary edema given recent addition of TPN nutrition.  Bronch (2/18) with very friable tissue, cutlures only with C. glabrata.  Persistent respiratory failure also complicated by hypoventilation and deconditioning d/t malnutrition. Neurology consulted 2/27, MRI brain (3/1) without acute intracranial pathology.  SNIFF (3/8) normal.  Metabolic CART suggested overfeeding on TPN.  Returned to ICU (3/18-3/20) d/t tenuous respiratory status complicated by NIPPV intolerance and hypervolemia with iHD limited d/t hypotension (CXR with increased pulmonary edema).  Overall, her PCO2 retention is likely multifactorial with a component being from overfeeding (though remedied with nutrition adjustment), metabolic compensation barrier d/t renal failure, pulmonary edema, bicarb loss with diarrhea, hypoventilation with declining NIF and FVC concerning for neuromuscular etiology (though Neurology consult was not revealing), and NIPPV intolerance due to claustrophobia. Worsening hypoxic and hypercapneic respiratory failure 3/24 and transferred to ICU for intubation. CT chest with extensive bilateral infiltrates markedly increased from previous.  Bronch with small L>R sided secretions easily suctioned, BAL with no evidence of DAH, non bloody.  S/p Zosyn (3/23-4/2) for 10 day empiric course.  Extubated 4/2, hypercapnia stable  with consistent overnight BiPAP use through 4/5.  Now with increased hypercapnia this morning, TV on BiPAP decreased some from prior nights.  - Nebs and Volera QID stopped 4/4 per MICU, monitor closely for need to resume nebs (sticky, tenacious secretions)  - Daily VBG, goal for permissive hypercapnea to mid 60's  - Transition today from BiPAP 14/7 to AVAPS  ml with 12 minimum mmHg and EPAP 7 (messaged RT)  - Strict adherence to NIPPV overnight and with all naps, primary team to obtain home NIPPV machine and work to transition to long-term device while inpatient  - Repeat CXR 4/8 (ordered)     Immunosuppression:  AZA previously stopped 5/2023 d/t low ImmuKnow assay and EBV viremia.  ImmuKnow (2/27) remained low at 88.  ImmuKnow (4/1) further decreased to 43.  Transitioned from [place] to CSA 3/11.  -  BID (decreased 4/3).  Goal 120-140 (decreased 4/4 for ImmuKnow).  Level today 135 (11h), no dose adjustment.  Repeat level 4/9 (ordered).  - Prednisone 5 mg daily     Prophylaxis:   - Bactrim qMWF for PJP ppx  - CMV ppx not currently indicated, D+/R+, CMV negative 3/18     Positive DSA: AMR treatment deferred given frailty and stable PFTs.  DSA DQB2 decreased on 3/6 with 5667 mfi, and again decreased to 4960 on 3/23.  Most recent cell-free DNA (2/18) mildly elevated at 1.04 (concerning for possible rejection), which was increased from prior level of 0.12 (1/10).  IST increase deferred at that time per Dr. Gong.  S/p IVIG (2/27) for DSA (IgG WNL).  Immuknow as above.  Prospera (cell free DNA) 0.44 (3/18) suggests AMR less likely, follow  - Repeat DSA monthly (due 4/23)      EBV viremia: CT CAP (2/7) without lymphadenopathy.  Recent levels improving (3/18) 23k.  - Repeat EBV due 4/23     Other relevant problems being managed by the primary team:      FTT:  Severe protein calorie malnutrition:  Gastroparesis s/p PEG/J, botox, and G-POEM:  SB hypomotility:  Pyloric ulcer:  Chronic nausea and osmotic  diarrhea:  SIBO s/p rifaximin:   Recurrent C diff colitis: Chronic osmotic and infectious diarrhea since transplant with recurrent episodes of C diff.  Notable weight loss (40# in a year) d/t diarrhea, GI dysmotility, and intolerance of enteral feeds (PEG/J tube in place), most recently on elemental formula.  Extensive OP eval and f/u with GI. S/p port placement for TPN and lipids. Continued for ~5 weeks inpatient without considerable improvement, transitioned back to TF.  C diff positive (3/13), on fidaxomicin.  Repeat (4/4) negative, enteric B/V panel also negative.  Loose stools persist, GI consulted 4/4.  Colonoscopy recommended by GI this admission, but pt. deferred d/t risk for reintubation and improvement in diarrhea today  - Calorie counts continue, consider BID supplemental ice cream shakes     ESRD: CT with volume overload secondary to TPN volume, iHD increased from PTA TThSa schedule with unsustained improvement.  Management per Nephrology, dialysis via [facility].  Unable to remove fluid on 3/23 d/t hypotension, tolerance now improved with midodrine on HD days.  Volume removal and dialysis per nephrology.     Goals of care: Care conference held with palliative and primary team on 3/15 for medical update with pt. and daughters.  Repeat care conference 3/29 (see palliative and MICU notes) patient would like to proceed with re-intubation and trach if pt. fails extubation d/t progressive hypercapnia, understanding that this would severely complicate potential disposition options.     We appreciate the excellent care provided by the [place] [id number] team.  Recommendations communicated via text and this note.  Will continue to follow along closely, please do not hesitate to call with any questions or concerns.    Patient discussed with Dr. Miller.    Catherine Johnson, DNP, APRN, CNP  Inpatient Nurse Practitioner  Pulmonary CF/Transplant     Subjective & Interval History:     Good adherence to BiPAP again last  night, though CO2 this morning increased to 72 (had been 61-66).  TV on BiPAP documented in low 300's, had been ~330-355 prior.  No new cough or sputum.  No new HA or daytime fatigue.  Ambulated in the halls yesterday.  Eating well, still with loose stools.    Review of Systems:     C: No fever, no chills, + decreased weight  INTEGUMENTARY/SKIN: No rash or obvious new lesions  ENT/MOUTH: No nasal congestion or drainage  RESP: See interval history  CV: No chest pain, no palpitations, no peripheral edema, no orthopnea  GI: No reflux symptoms  : Oliguria  MUSCULOSKELETAL: No myalgias, no arthralgias  ENDOCRINE: Blood sugars with adequate control  NEURO: No headache, no numbness or tingling  PSYCHIATRIC: Mood stable    Physical Exam:     All notes, images, and labs from past 24 hours (at minimum) were reviewed.    Vital signs:  Temp: 98.5  F (36.9  C) Temp src: Oral BP: 131/53 Pulse: 64   Resp: 16 SpO2: 100 % O2 Device: None (Room air) Oxygen Delivery: 4 LPM   Weight: 38.5 kg (84 lb 14 oz)  I/O:   Intake/Output Summary (Last 24 hours) at 4/6/2024 0910  Last data filed at 4/6/2024 0700  Gross per 24 hour   Intake 1190 ml   Output --   Net 1190 ml     Constitutional: Lying in bed in dialysis, frail appearing, in no apparent distress.   HEENT: Eyes with pink conjunctivae, anicteric.  Oral mucosa moist without lesions.   PULM: Mildly diminished bases bilaterally.  No crackles, no rhonchi, no wheezes.  Non-labored breathing on RA.  CV: Normal S1 and S2.  RRR.  No murmur, gallop, or rub.  No peripheral edema.   ABD: NABS, soft, nontender, nondistended.  PEG/J tube site not visualized.  MSK: Moves all extremities.  + muscle wasting.   NEURO: Alert and conversant.   SKIN: Warm, dry, fragile, scattered ecchymosis.   PSYCH: Mood stable.     Data:     LABS    CMP:   Recent Labs   Lab 04/06/24  0432 04/05/24  0437 04/04/24  0320 04/03/24  0351    142 139 136   POTASSIUM 4.2 3.4 4.0 3.6   CHLORIDE 102 103 100 96*   CO2 26 28  "25 29   ANIONGAP 14 11 14 11   * 108* 100* 119*   BUN 60.0* 30.0* 46.2* 29.6*   CR 4.78* 2.87* 3.97* 2.56*   GFRESTIMATED 10* 18* 12* 21*   ESTUARDO 9.6 9.3 9.3 8.9   MAG 2.0 1.9 2.2 1.8   PHOS 4.8* 3.3 4.8* 4.3   PROTTOTAL 5.7* 6.1* 6.1* 5.8*   ALBUMIN 3.6 3.8 3.8 3.6   BILITOTAL 0.2 0.2 0.2 0.2   ALKPHOS 110 103 114 88   AST 14 15 17 15   ALT 12 12 14 13     CBC:   Recent Labs   Lab 04/06/24 0728 04/06/24 0432 04/05/24 0437 04/04/24  0320   WBC 7.6 7.8 6.5 7.2   RBC 2.74* 2.79* 3.02* 2.98*   HGB 9.5* 9.8* 10.5* 10.1*   HCT 30.7* 31.6* 33.6* 32.7*   * 113* 111* 110*   MCH 34.7* 35.1* 34.8* 33.9*   MCHC 30.9* 31.0* 31.3* 30.9*   RDW 17.4* 17.2* 16.7* 16.4*    308 289 334       INR:   Recent Labs   Lab 04/06/24 0432 04/05/24 0437 04/04/24  0320 04/03/24  0351   INR 1.11 1.10 1.17* 1.26*       Glucose:   Recent Labs   Lab 04/06/24  0432 04/05/24  0437 04/04/24  0320 04/03/24  0351 04/02/24  2305 04/02/24  0306   * 108* 100* 119* 107* 125*       Blood Gas:   Recent Labs   Lab 04/06/24 0432 04/05/24  0437 04/04/24  0320   PHV 7.23* 7.28* 7.26*   PCO2V 72* 66* 64*   PO2V 42 50* 46   HCO3V 30* 31* 29*   LINDY 1.4 2.9 0.8   O2PER 30 30 30       Culture Data No results for input(s): \"CULT\" in the last 168 hours.    Virology Data:   Lab Results   Component Value Date    FLUAH1 Not Detected 03/24/2024    FLUAH3 Not Detected 03/24/2024    CF4611 Not Detected 03/24/2024    IFLUB Not Detected 03/24/2024    RSVA Not Detected 03/24/2024    RSVB Not Detected 03/24/2024    PIV1 Not Detected 03/24/2024    PIV2 Not Detected 03/24/2024    PIV3 Not Detected 03/24/2024    HMPV Not Detected 03/24/2024       Historical CMV results (last 3 of prior testing):  Lab Results   Component Value Date    CMVQNT Not Detected 03/18/2024    CMVQNT Not Detected 02/19/2024    CMVQNT Not Detected 02/07/2024     Lab Results   Component Value Date    CMVLOG 3.2 07/12/2023    CMVLOG <2.1 04/19/2023    CMVLOG 3.5 01/25/2023 "       Urine Studies    Recent Labs   Lab Test 02/18/24  0222 05/18/23  0627   URINEPH 7.5* 5.0   NITRITE Negative Negative   LEUKEST Trace* Moderate*   WBCU 66* 21*       Most Recent [place] Pulmonary Function Testing (FVC/FEV1 only)  FVC-Pre   Date Value Ref Range Status   02/07/2024 1.19 L    01/10/2024 1.12 L    08/29/2023 1.48 L    07/25/2023 1.55 L      FVC-%Pred-Pre   Date Value Ref Range Status   02/07/2024 42 %    01/10/2024 39 %    08/29/2023 53 %    07/25/2023 55 %      FEV1-Pre   Date Value Ref Range Status   02/07/2024 1.13 L    01/10/2024 1.10 L    08/29/2023 1.43 L    07/25/2023 1.54 L      FEV1-%Pred-Pre   Date Value Ref Range Status   02/07/2024 51 %    01/10/2024 49 %    08/29/2023 64 %    07/25/2023 69 %        IMAGING    No results found for this or any previous visit (from the past 48 hour(s)).

## 2024-04-06 NOTE — PLAN OF CARE
Goal Outcome Evaluation:  ICU End of Shift Summary. See flowsheets for vital signs and detailed assessment.    Changes this shift: Patient alert and oriented x4.  Patient was able to sleep much of the night.  She had a few loose stools.  Patient is feeling good.      Plan:  Dialysis today.  Patient labs monitored.  Patient is feeling well.

## 2024-04-06 NOTE — PROGRESS NOTES
ICU End of Shift Summary: See flowsheets for vital signs and detailed assessment    Changes this shift: HD today with 3.5 hr run and 2.5L fluid removed. PICC removed. Cardiac telemetry discontinued. Room air all day. Tolerating regular diet. Poor oral intake. Multiple varieties of supplemental drinks provided per pt request by RD. Enteric status discontinued. Continues on tube feeds. Ear pain evaluated by provider; diagnosis deferred until tomorrow.     Plan: Transfer to floor when able. Encourage activity/OOB.

## 2024-04-07 ENCOUNTER — APPOINTMENT (OUTPATIENT)
Dept: PHYSICAL THERAPY | Facility: CLINIC | Age: 64
DRG: 640 | End: 2024-04-07
Attending: INTERNAL MEDICINE
Payer: MEDICARE

## 2024-04-07 LAB
ALBUMIN SERPL BCG-MCNC: 3.8 G/DL (ref 3.5–5.2)
ALP SERPL-CCNC: 115 U/L (ref 40–150)
ALT SERPL W P-5'-P-CCNC: 10 U/L (ref 0–50)
ANION GAP SERPL CALCULATED.3IONS-SCNC: 13 MMOL/L (ref 7–15)
AST SERPL W P-5'-P-CCNC: 16 U/L (ref 0–45)
BACTERIA BRONCH: NORMAL
BASE EXCESS BLDV CALC-SCNC: 1.4 MMOL/L (ref -3–3)
BILIRUB SERPL-MCNC: 0.2 MG/DL
BUN SERPL-MCNC: 46.3 MG/DL (ref 8–23)
CALCIUM SERPL-MCNC: 9.5 MG/DL (ref 8.8–10.2)
CHLORIDE SERPL-SCNC: 95 MMOL/L (ref 98–107)
CREAT SERPL-MCNC: 3.14 MG/DL (ref 0.51–0.95)
DEPRECATED HCO3 PLAS-SCNC: 27 MMOL/L (ref 22–29)
EGFRCR SERPLBLD CKD-EPI 2021: 16 ML/MIN/1.73M2
GLUCOSE SERPL-MCNC: 104 MG/DL (ref 70–99)
HCO3 BLDV-SCNC: 31 MMOL/L (ref 21–28)
HOLD SPECIMEN: NORMAL
INR PPP: 1.1 (ref 0.85–1.15)
MAGNESIUM SERPL-MCNC: 1.8 MG/DL (ref 1.7–2.3)
O2/TOTAL GAS SETTING VFR VENT: 30 %
OXYHGB MFR BLDV: 41 % (ref 70–75)
PCO2 BLDV: 74 MM HG (ref 40–50)
PH BLDV: 7.23 [PH] (ref 7.32–7.43)
PHOSPHATE SERPL-MCNC: 3.9 MG/DL (ref 2.5–4.5)
PO2 BLDV: 27 MM HG (ref 25–47)
POTASSIUM SERPL-SCNC: 4.5 MMOL/L (ref 3.4–5.3)
PROT SERPL-MCNC: 6.3 G/DL (ref 6.4–8.3)
SAO2 % BLDV: 42 % (ref 70–75)
SODIUM SERPL-SCNC: 135 MMOL/L (ref 135–145)

## 2024-04-07 PROCEDURE — 36415 COLL VENOUS BLD VENIPUNCTURE: CPT

## 2024-04-07 PROCEDURE — 97530 THERAPEUTIC ACTIVITIES: CPT | Mod: GP

## 2024-04-07 PROCEDURE — 99232 SBSQ HOSP IP/OBS MODERATE 35: CPT | Mod: GC | Performed by: HOSPITALIST

## 2024-04-07 PROCEDURE — 250N000013 HC RX MED GY IP 250 OP 250 PS 637

## 2024-04-07 PROCEDURE — 82805 BLOOD GASES W/O2 SATURATION: CPT

## 2024-04-07 PROCEDURE — 250N000013 HC RX MED GY IP 250 OP 250 PS 637: Performed by: INTERNAL MEDICINE

## 2024-04-07 PROCEDURE — 120N000002 HC R&B MED SURG/OB UMMC

## 2024-04-07 PROCEDURE — 94660 CPAP INITIATION&MGMT: CPT

## 2024-04-07 PROCEDURE — 97116 GAIT TRAINING THERAPY: CPT | Mod: GP

## 2024-04-07 PROCEDURE — 250N000012 HC RX MED GY IP 250 OP 636 PS 637: Performed by: NURSE PRACTITIONER

## 2024-04-07 PROCEDURE — 80053 COMPREHEN METABOLIC PANEL: CPT

## 2024-04-07 PROCEDURE — 84100 ASSAY OF PHOSPHORUS: CPT | Performed by: STUDENT IN AN ORGANIZED HEALTH CARE EDUCATION/TRAINING PROGRAM

## 2024-04-07 PROCEDURE — 99233 SBSQ HOSP IP/OBS HIGH 50: CPT | Performed by: NURSE PRACTITIONER

## 2024-04-07 PROCEDURE — 999N000157 HC STATISTIC RCP TIME EA 10 MIN

## 2024-04-07 PROCEDURE — 83735 ASSAY OF MAGNESIUM: CPT | Performed by: STUDENT IN AN ORGANIZED HEALTH CARE EDUCATION/TRAINING PROGRAM

## 2024-04-07 PROCEDURE — 85610 PROTHROMBIN TIME: CPT | Performed by: STUDENT IN AN ORGANIZED HEALTH CARE EDUCATION/TRAINING PROGRAM

## 2024-04-07 PROCEDURE — 250N000012 HC RX MED GY IP 250 OP 636 PS 637

## 2024-04-07 RX ORDER — FLUTICASONE PROPIONATE 50 MCG
1 SPRAY, SUSPENSION (ML) NASAL DAILY
Status: DISCONTINUED | OUTPATIENT
Start: 2024-04-07 | End: 2024-04-15 | Stop reason: HOSPADM

## 2024-04-07 RX ADMIN — METOPROLOL TARTRATE 25 MG: 25 TABLET, FILM COATED ORAL at 08:20

## 2024-04-07 RX ADMIN — Medication 2.5 MCG: at 08:20

## 2024-04-07 RX ADMIN — LEVOTHYROXINE SODIUM 25 MCG: 0.03 TABLET ORAL at 08:20

## 2024-04-07 RX ADMIN — CALCIUM CARBONATE 600 MG (1,500 MG)-VITAMIN D3 400 UNIT TABLET 1 TABLET: at 11:53

## 2024-04-07 RX ADMIN — SEVELAMER CARBONATE 0.8 G: 800 POWDER, FOR SUSPENSION ORAL at 08:21

## 2024-04-07 RX ADMIN — CALCIUM CARBONATE 600 MG (1,500 MG)-VITAMIN D3 400 UNIT TABLET 1 TABLET: at 08:20

## 2024-04-07 RX ADMIN — CYCLOSPORINE 100 MG: 100 SOLUTION ORAL at 17:43

## 2024-04-07 RX ADMIN — AMIODARONE HYDROCHLORIDE 200 MG: 200 TABLET ORAL at 08:20

## 2024-04-07 RX ADMIN — LIDOCAINE 4% 1 PATCH: 40 PATCH TOPICAL at 21:17

## 2024-04-07 RX ADMIN — CYCLOSPORINE 100 MG: 100 SOLUTION ORAL at 08:20

## 2024-04-07 RX ADMIN — PREDNISONE 5 MG: 5 TABLET ORAL at 08:19

## 2024-04-07 RX ADMIN — Medication 40 MG: at 08:20

## 2024-04-07 RX ADMIN — APIXABAN 2.5 MG: 2.5 TABLET, FILM COATED ORAL at 08:20

## 2024-04-07 ASSESSMENT — ACTIVITIES OF DAILY LIVING (ADL)
ADLS_ACUITY_SCORE: 33

## 2024-04-07 NOTE — PROGRESS NOTES
Pulmonary Medicine  Cystic Fibrosis - Lung Transplant Team  Progress Note  2024     Patient: Sofie Rodriguez  MRN: [id number]  : 1960 (age 63 year old)  Transplant: 2022 (Lung), POD#[id number]  Admission date: 2/10/2024    Assessment & Plan:     Sofie Rodriguez is a 63 year old female with h/o COPD s/p BSLT () with course complicated by post-operative hemidiaphragm palsy, recurrent PNAs, positive DSA, EBV viremia, hypogammaglobulinemia, severe gastroparesis s/p G/J tube placement (22) and pyloric botox (23), GI bleed /2 pyloric ulcer, hemobilia s/p ERCP and MRCP, chronic diarrhea, recurrent C diff colitis, ESRD on iHD, recurrent falls with injuries (recent right hip fx s/p ORIF 2023), deconditioning, and FTT.  Admitted 2/10 for failure to thrive and initiation of TPN/lipids.  Emergent intubation - for hypoxic and hypercapneic respiratory failure and encephalopathy. Returned to ICU - and again 3/18-3/20 d/t tenuous respiratory status complicated by variable daytime NIPPV tolerance and hypervolemia with iHD limited d/t hypotension. Again transferred back to ICU 3/24 for intubation and bronch/BAL given hypoxic and hypercapneic respiratory failure. CT with extensive bilateral infiltrate and etiology likely multifactorial including volume overload and infection, possible mucous plugging, ACR or AMR less likely.  Extubated , no daytime hypoxia and hypercapnia remains stable with good adherence to NIPPV with sleep.  Tolerating PO but with persistent diarrhea, improving some.  Discharge pending stability on home NIPPV machine, dispo per therapy.      Today's recommendations:  - Daily VBG, goal for permissive hypercapnea to mid 60's  - AVAPS to continue tonight, increase TV to 400 ml (continue 12 minimum mmHg and EPAP 7)  - Strict adherence to NIPPV overnight and with all naps, primary team to obtain home NIPPV machine and work to transition to long-term device while  inpatient  - Repeat CXR 4/8 (ordered)  - CSA level 4/9 (ordered)  - DSA and EBV due 4/23  - Calorie counts continue with BID supplemental ice cream shakes     Acute on chronic hypoxic/hypercapneic respiratory failure:  S/p bilateral sequential lung transplant for COPD:   Hypoventilation, Suspected CARLEE:  PFTs in clinic remained significantly below her baseline.  Baseline hypoxia with 2L NC overnight PTA.  S/p intubation 2/17-2/19 for acute hypoxic/hypercapneic respiratory failure with encephalopathy, CT with concern for infection and pulmonary edema given recent addition of TPN nutrition.  Bronch (2/18) with very friable tissue, cutlures only with C. glabrata.  Persistent respiratory failure also complicated by hypoventilation and deconditioning d/t malnutrition. Neurology consulted 2/27, MRI brain (3/1) without acute intracranial pathology.  SNIFF (3/8) normal.  Metabolic CART suggested overfeeding on TPN.  Returned to ICU (3/18-3/20) d/t tenuous respiratory status complicated by NIPPV intolerance and hypervolemia with iHD limited d/t hypotension (CXR with increased pulmonary edema).  Overall, her PCO2 retention is likely multifactorial with a component being from overfeeding (though remedied with nutrition adjustment), metabolic compensation barrier d/t renal failure, pulmonary edema, bicarb loss with diarrhea, hypoventilation with declining NIF and FVC concerning for neuromuscular etiology (though Neurology consult was not revealing), and NIPPV intolerance due to claustrophobia. Worsening hypoxic and hypercapneic respiratory failure 3/24 and transferred to ICU for intubation. CT chest with extensive bilateral infiltrates markedly increased from previous.  Bronch with small L>R sided secretions easily suctioned, BAL with no evidence of DAH, non bloody.  S/p Zosyn (3/23-4/2) for 10 day empiric course.  Extubated 4/2, hypercapnia stable with consistent overnight BiPAP use through 4/5.  Increased hypercapnia 4/6  associated with decreased documented TV on BiPAP the night prior, transitioned to AVAPS with starting  ml but hypercapnia persists.  - Nebs and Volera QID stopped 4/4 per MICU, monitor closely for need to resume nebs (sticky, tenacious secretions)  - Daily VBG, goal for permissive hypercapnea to mid 60's  - AVAPS to continue tonight, increase TV to 400 ml (continue 12 minimum mmHg and EPAP 7); ideal body weight for height up to 130# correlating with upper limit of TV at 8 ml/kg at 470 ml  - Strict adherence to NIPPV overnight and with all naps, primary team to obtain home NIPPV machine and work to transition to long-term device while inpatient  - Repeat CXR 4/8 (ordered)     Immunosuppression:  AZA previously stopped 5/2023 d/t low ImmuKnow assay and EBV viremia.  ImmuKnow (2/27) remained low at 88.  ImmuKnow (4/1) further decreased to 43.  Transitioned from [place] to CSA 3/11.  -  BID (decreased 4/3).  Goal 120-140 (decreased 4/4 for ImmuKnow).  Repeat level 4/9 (ordered).  - Prednisone 5 mg daily     Prophylaxis:   - Bactrim qMWF for PJP ppx  - CMV ppx not currently indicated, D+/R+, CMV negative 3/18     Positive DSA: AMR treatment deferred given frailty and stable PFTs.  DSA DQB2 decreased on 3/6 with 5667 mfi, and again decreased to 4960 on 3/23.  Most recent cell-free DNA (2/18) mildly elevated at 1.04 (concerning for possible rejection), which was increased from prior level of 0.12 (1/10).  IST increase deferred at that time per Dr. Gong.  S/p IVIG (2/27) for DSA (IgG WNL).  Immuknow as above.  Prospera (cell free DNA) 0.44 (3/18) suggests AMR less likely, follow  - Repeat DSA monthly (due 4/23)      EBV viremia: CT CAP (2/7) without lymphadenopathy.  Recent levels improving (3/18) 23k.  - Repeat EBV due 4/23     Other relevant problems being managed by the primary team:      FTT:  Severe protein calorie malnutrition:  Gastroparesis s/p PEG/J, botox, and G-POEM:  SB hypomotility:  Pyloric  ulcer:  Chronic nausea and osmotic diarrhea:  SIBO s/p rifaximin:   Recurrent C diff colitis: Chronic osmotic and infectious diarrhea since transplant with recurrent episodes of C diff.  Notable weight loss (40# in a year) d/t diarrhea, GI dysmotility, and intolerance of enteral feeds (PEG/J tube in place), most recently on elemental formula.  Extensive OP eval and f/u with GI. S/p port placement for TPN and lipids. Continued for ~5 weeks inpatient without considerable improvement, transitioned back to TF.  C diff positive (3/13), on fidaxomicin.  Repeat (4/4) negative, enteric B/V panel also negative.  Loose stools persist, GI consulted 4/4.  Colonoscopy recommended by GI this admission, but pt. deferred d/t risk for reintubation and improvement in diarrhea today  - Calorie counts continue, BID supplemental ice cream shakes     ESRD: CT with volume overload secondary to TPN volume, iHD increased from PTA TThSa schedule with unsustained improvement.  Management per Nephrology, dialysis via [facility].  Unable to remove fluid on 3/23 d/t hypotension, tolerance now improved with midodrine on HD days.  Volume removal and dialysis per nephrology.     Goals of care: Care conference held with palliative and primary team on 3/15 for medical update with pt. and daughters.  Repeat care conference 3/29 (see palliative and MICU notes) patient would like to proceed with re-intubation and trach if pt. fails extubation d/t progressive hypercapnia, understanding that this would severely complicate potential disposition options.     We appreciate the excellent care provided by the [place] [id number] team.  Recommendations communicated via text and this note.  Will continue to follow along closely, please do not hesitate to call with any questions or concerns.     Patient discussed with Dr. Miller.    Catherine Johnson, DNP, APRN, CNP  Inpatient Nurse Practitioner  Pulmonary CF/Transplant     Subjective & Interval History:     Slept well  last night with excellent tolerance of AVAPS.  No new cough or sputum.  Ambulating well with therapies and 4WW.    Review of Systems:     C: No fever, no chills  INTEGUMENTARY/SKIN: No rash or obvious new lesions  ENT/MOUTH: No nasal congestion or drainage  RESP: See interval history  CV: No chest pain, no palpitations, no peripheral edema, no orthopnea  GI: No reflux symptoms  : Oliguria  MUSCULOSKELETAL: No myalgias, no arthralgias  ENDOCRINE: Blood sugars with adequate control  NEURO: No headache, no numbness or tingling  PSYCHIATRIC: Mood stable    Physical Exam:     All notes, images, and labs from past 24 hours (at minimum) were reviewed.    Vital signs:  Temp: 98.1  F (36.7  C) Temp src: Oral BP: 107/58 Pulse: 62   Resp: 18 SpO2: 100 % O2 Device: BiPAP/CPAP Oxygen Delivery: 4 LPM   Weight: 41.3 kg (91 lb 1.6 oz)  I/O:   Intake/Output Summary (Last 24 hours) at 4/7/2024 0807  Last data filed at 4/7/2024 0600  Gross per 24 hour   Intake 1905 ml   Output 2500 ml   Net -595 ml     Constitutional: Ambulating in hallway with therapy, thin appearing, in no apparent distress.   HEENT: Eyes with pink conjunctivae, anicteric.  Oral mucosa moist without lesions.   PULM: Mildly diminished bases bilaterally.  No crackles, no rhonchi, no wheezes.  Non-labored breathing on RA.  CV: Normal S1 and S2.  RRR.  No murmur, gallop, or rub.  No peripheral edema.   ABD: NABS, soft, nontender, nondistended.  PEG/J tube site not visualized.  MSK: Moves all extremities.  + muscle wasting.   NEURO: Alert and conversant.   SKIN: Warm, dry, fragile, scattered ecchymosis.   PSYCH: Mood stable.     Data:     LABS    CMP:   Recent Labs   Lab 04/07/24  0635 04/06/24  0432 04/05/24  0437 04/04/24  0320    142 142 139   POTASSIUM 4.5 4.2 3.4 4.0   CHLORIDE 95* 102 103 100   CO2 27 26 28 25   ANIONGAP 13 14 11 14   * 114* 108* 100*   BUN 46.3* 60.0* 30.0* 46.2*   CR 3.14* 4.78* 2.87* 3.97*   GFRESTIMATED 16* 10* 18* 12*   ESTUARDO 9.5  "9.6 9.3 9.3   MAG 1.8 2.0 1.9 2.2   PHOS 3.9 4.8* 3.3 4.8*   PROTTOTAL 6.3* 5.7* 6.1* 6.1*   ALBUMIN 3.8 3.6 3.8 3.8   BILITOTAL 0.2 0.2 0.2 0.2   ALKPHOS 115 110 103 114   AST 16 14 15 17   ALT 10 12 12 14     CBC:   Recent Labs   Lab 04/06/24  0728 04/06/24  0432 04/05/24  0437 04/04/24  0320   WBC 7.6 7.8 6.5 7.2   RBC 2.74* 2.79* 3.02* 2.98*   HGB 9.5* 9.8* 10.5* 10.1*   HCT 30.7* 31.6* 33.6* 32.7*   * 113* 111* 110*   MCH 34.7* 35.1* 34.8* 33.9*   MCHC 30.9* 31.0* 31.3* 30.9*   RDW 17.4* 17.2* 16.7* 16.4*    308 289 334       INR:   Recent Labs   Lab 04/07/24  0635 04/06/24  0432 04/05/24  0437 04/04/24  0320   INR 1.10 1.11 1.10 1.17*       Glucose:   Recent Labs   Lab 04/07/24  0635 04/06/24  0432 04/05/24  0437 04/04/24  0320 04/03/24  0351 04/02/24  2305   * 114* 108* 100* 119* 107*       Blood Gas:   Recent Labs   Lab 04/07/24  0636 04/06/24  0432 04/05/24  0437   PHV 7.23* 7.23* 7.28*   PCO2V 74* 72* 66*   PO2V 27 42 50*   HCO3V 31* 30* 31*   LINDY 1.4 1.4 2.9   O2PER 30 30 30       Culture Data No results for input(s): \"CULT\" in the last 168 hours.    Virology Data:   Lab Results   Component Value Date    FLUAH1 Not Detected 03/24/2024    FLUAH3 Not Detected 03/24/2024    CD8157 Not Detected 03/24/2024    IFLUB Not Detected 03/24/2024    RSVA Not Detected 03/24/2024    RSVB Not Detected 03/24/2024    PIV1 Not Detected 03/24/2024    PIV2 Not Detected 03/24/2024    PIV3 Not Detected 03/24/2024    HMPV Not Detected 03/24/2024       Historical CMV results (last 3 of prior testing):  Lab Results   Component Value Date    CMVQNT Not Detected 03/18/2024    CMVQNT Not Detected 02/19/2024    CMVQNT Not Detected 02/07/2024     Lab Results   Component Value Date    CMVLOG 3.2 07/12/2023    CMVLOG <2.1 04/19/2023    CMVLOG 3.5 01/25/2023       Urine Studies    Recent Labs   Lab Test 02/18/24  0222 05/18/23  0627   URINEPH 7.5* 5.0   NITRITE Negative Negative   LEUKEST Trace* Moderate*   WBCU 66* " 21*       Most Recent [place] Pulmonary Function Testing (FVC/FEV1 only)  FVC-Pre   Date Value Ref Range Status   02/07/2024 1.19 L    01/10/2024 1.12 L    08/29/2023 1.48 L    07/25/2023 1.55 L      FVC-%Pred-Pre   Date Value Ref Range Status   02/07/2024 42 %    01/10/2024 39 %    08/29/2023 53 %    07/25/2023 55 %      FEV1-Pre   Date Value Ref Range Status   02/07/2024 1.13 L    01/10/2024 1.10 L    08/29/2023 1.43 L    07/25/2023 1.54 L      FEV1-%Pred-Pre   Date Value Ref Range Status   02/07/2024 51 %    01/10/2024 49 %    08/29/2023 64 %    07/25/2023 69 %        IMAGING    No results found for this or any previous visit (from the past 48 hour(s)).

## 2024-04-07 NOTE — PLAN OF CARE
Patient has denied pain throughout the day. Has been on room air throughout the day with no complaints of SOB. PEG tube fell out per patient. 16 fr montgomery placed in PEG track to keep open until new PEG can be placed. Patient is up independently in room with no problems. Tolerating oral diet. Plan to transfer to floor when bed becomes available. Refer to flowsheets for documentation.

## 2024-04-07 NOTE — PLAN OF CARE
Goal Outcome Evaluation:       ICU End of Shift Summary. See flowsheets for vital signs and detailed assessment.    Changes this shift: Patient did well over night.  Patient is A&Ox4.  She is independent in the room.  She had 2 soft stools. She was able to sleep most of the night on her BiPap.      Plan:  Monitor.   Walk the halls.  Continue to monitor.

## 2024-04-07 NOTE — PROGRESS NOTES
Calorie Count  Intake recorded for: 4/6  Total Kcals: 1202 Total Protein: 53g  Kcals from Hospital Food: 1202   Protein: 53g  Kcals from Outside Food (average):0 Protein: 0g  # Meals Ordered from Kitchen: 1 meal  # Meals Recorded: 2  First - 100% omelet w/ nino, cheese, and vegetables, tater tots  Second - 100% toast w/ butter and peanut butter  # Supplements Recorded: 1 - 100% 1 Ensure Enlive w/ ice cream

## 2024-04-07 NOTE — PROGRESS NOTES
[facility]    Progress Note - Medicine Service, NILE TEAM 3       Date of Admission:  2/10/2024    Assessment & Plan   Sofie Rodriguez is a 63 year old female with PMH COPD s/p bilateral lung transplant 6/28/22 c/b hemidiaphragm palsy and recurrent pneumonias, gastroparesis and small bowel dysmotility complicated by severe malnutrition now s/p PEG/J, ESRD on M/W/F HD, recent R femoral fx s/p ORIF, chronic diarrhea, recurrent c-diff, failure to thrive with inability to tolerate any tube feeds, who was admitted to [facility] on 2/10/24 for concerns over malnutrition and TPN initiation via portacath. Course complicated by recurrent and progressive acute on chronic hypoxic and hypercarbic respiratory failure requiring multiple ICU admissions and intubation 2/18-2/19, 2/28-2/29, 3/18-3/20, for continuous BiPAP. Again with worsening respiratory acidosis and hypercarbia, concern for infection vs acute rejection, requiring transfer back to ICU 3/20/2024 for intubation and bronchoscopy. Weaned off ventilator to RA and BIPAP at night. Transferred to medicine on 4/4/2024.     Changes today:   -Tolerated AVAPS well overnight, having it on for 8.5 hours  -Improving left ear pain and stuffiness  -Nasal fluticasone  -Pending pulm recs on NIPPV adjustments    # Acute on chronic hypoxic and hypercarbic respiratory failure requiring intubation 2/17-2/19 3/25-4/2, improved  # Recurrent PNAs, concern for acute infection vs acute rejection  # S/p BSLT 6/8/22 for COPD  # R hemidiaphragm palsy   # Positive DSA   Hx bilateral lung transplant on 6/28/2022 for COPD.  Initially admitted on 2/10/2024 for initiation of TPN.  However she was admitted to the ICU on 2/17/2024 for hypoxic hypercarbic respiratory failure.  She was intubated on 2/18 - 2/19 due to pulmonary edema vs infection.  Was extubated onto BiPAP/AVAPS with improvement in mentation however continued to have respiratory  acidosis.She was transferred to medicine floor on 2/29.  Was transferred back to the ICU on 3/18 - 3/20 due to hypercarbia and severe respiratory acidosis that did not require intubation.  Patient again experience worsening respiratory acidosis and hypercarbia will concern for infection VS acute rejection that required transfer back to ICU on 3/20/2024 for intubation and bronch.  She was weaned off the ventilator to room air and BiPAP at night.  Transferred to medicine on 4//2024.  She has had imaging to evaluate neurologic causes of decreased respiratory drive, without any notable findings.  Mentation remained stable despite low pH and high pCO2.  Volume status has improved with hemodialysis.  Overall her pCO2 retention is thought to be multifactorial, with a component of overfeeding through TPN, infection, less bicarb loss through GI sources with improving diarrhea, and NIPPV intolerance due to claustrophobia.  - Daily VBG with adjustments to NIPPV   - Transplant pulm following, appreciate recs  - Immunosuppression:   >Prednisone 5 mg every morning, 2.5 mg every afternoon  >Cyclosporine 100mg BID (Stopped tacrolimus 3/10)   >Overnight oximetry study suggestive of O2 vs CPAP need, as did require up to 2LPM overnight to prevent hypoxia  >Minimize O2 to preserve respiratory drive, given IVIG for DSA+   >D/c'd theophylline 3/3/24 due to difficulty attaining therapeutic levels (started by ICU), could consider Modafinil   - Airway clearance/nebs:  --> resume BID scheduled if secretions worsen/thicken  - Xopenex neb BID PRN  - Mucomyst stopped 4/4   - Volume removal with iHD  - Limit medications that would depress respiration  - BIPAP at hs and naps at all times. Ideally 7-8 hours overnight, limited by claustrophobia, medications as below  - Zyprexa 5mg at bedtime   - Atarax 25 mg TID PRN for anxiety  - Clonidine 0.1 mg at bedtime  - High risk for reintubation given hx of hypercarbia   - Will need bipap machine for home  prior to discharge   - Working with CM/SW     L Ear pain and muffled hearing, improving  Rhinorrhea  Patient notes 3 days of L ear pain and mild rhinorrhea without cough or worsening SOB over the last 2-3 days. No systemic signs of new infection. No discharge from L ear on exam. Moderate ear wax burden. No bleeding or erythema. Low concern for acute otitis media. Could be a viral URI vs discomfort from using BIPAP/AVAPs more consistently leading to pressure imbalance across her tympanic membrane.   - CTM for now  - Daily fluticasone nasal sprays started on 4/7/2024     # Goals of Care  - 3/15 Care conference on with Transplant Pulm, Pall Care, Medicine, daughters (Julia Nash) and Transplant SW. Overall medical updates provided and Qs answered. With declining respiratory acidosis on labs, discussed code status 3/18. Patient initially not desiring any escalation of her care to ICU/intubation with frustration re overall course. However, after discussing with her daughter on the phone she and family elected to remain full code and agreed to ICU admission and intubation if necessary.   - 3/29 Care conference: Laid out a few options for family and patient to consider with qs answered. Examples being we could attempt to extubate to BiPAP but plan should be established prior to extubation that if patient decompensates and requires reintubation then likely pursue trach versus more comfort approach. Other option is going straight for trach now. Patient and family (daughter Julia and son present on Ipad) considering the options and had a lot of questions about trach and what that would look like long-term, which was laid out for the family and patient.   - Pt made the decision this hospitalization, that if needed down the line she would be acceptable of a tracheostomy     # Chronic osmotic diarrhea/SIBO s/p Rifaximin  # Recurrent C.Diff (3rd ep 3/12/24)    # Diarrhea, improving  Recurrent C.diff 3/12, Fidaxomicin x 10 days  (3/13-3/22), with improvement in diarrhea. Transplant pulm requesting repeat colonoscopy w/ Bx after completion of c.diff treatment, to r/o toxicity or other reason that diarrhea is present.  C.diff and enteric panel on 4/4/2024 was unremarkable.  Patient initially agreeable to colonoscopy, however later noted that she would like to defer colonoscopy for concerns regarding her weight and reintubation for the procedure. Diarrhea improving with PRN imodium.   - GI consult for recurrent diarrhea in the MICU, appreciate recs   - C.diff and enteric panel negative  - Confirmed Osmotic diarrhea with stool studies--> Likely not infectious, continue adjusting TF and loperamide PRN  - Bowel regimen PRN  - Imodium PRN     # Severe malnutrition   # Failure to thrive  # Hypoalbuminemia  # Gastroparesis, small bowel dysmotility  # S/P PEG/J with intolerance of enteral nutrition  Patient with gastroparesis (presumed due to vagal injury) and small bowel dysmotility complicated by unintentional 40lb weight loss over the past year and now severe malnutrition. Previously intolerant to oral food intake due to nausea. Was initially admitted for portacath and TPN initiation since 2/13. Intermittently tolerating feeds without n/v from 2/20.  Per GI, no ongoing concerns for SIBO as of 2/23. As of 3/11 transplant pulmonology is worried that TPN is not a realistic plan to continue at home and transitioned back to TF (RD oncerned pt is not absorbing any oral intake). TPN discontinued 3/16. Transplant pulm also worried about mucosal toxicity.   - Metabolic cart 4/5/24 following 24 hours of calorie counts   - Nutrition consulted, appreciate assistance  - Continue TF at goal rate 35 ml/hr via PEG/J  - Speech saw pt 4/3 okay for regular diet with thin liquids; will continue pt tube feeds via pts g/j tube (hx of gastroparesis requiring TPN) until pt able to take in adequate PO nutrition            # Recurrent pneumonia, concern for acute infection  vs rejection  # Recurrent C.Diff colitis (3rd ep 3/12/24)  # Hx EBV viremia   # Hypogammaglobulinemia  # Chronic immunosuppression / lung transplant  Empirically treated for pneumonia  - , RVP and cultures no growth to date. Recurrent C.Diff Positive 3/12, s/p PO Fidaxomicin 200 mg BID for 10 days (3/13-3/22) with improvement in diarrhea. Remains afebrile, leukocytosis resolved.  Ig, s/p IVIG.  EBV 27K (decreased compared to prior).     - Follow up BAL and blood cultures from 3/24, NGTD     Antibiotics:  Zosyn ( - , 3/24 - )   Bactrim (MWF, PJP ppx, previously on Dapsone)  Vancomycin ( - , 3/24-3/25)  Micafungin (- , 3/24 x1)  Fidaxomicin (3/13-3/22)     Micro:   - BAL 3/24:   - Cell count w diff: PMN 75%, lymphocytes 5, monocytes 19, eos 1  - No organisms seen on Gram stain  - RVP, HSV, Histoplasma neg  - Fungal & bacterial cultures NGTD  - EBV, CMV, PJP, Aspergillus, Legionella, Mycoplasma, AFB in process  - Bcx 3/24 NGTD    Chronic issues    # A-flutter (recurrent on 3/17)  # A-fib, intermittent  # HTN  # HFpEF  Noted Afib/flutter intermittently throughout admission. Was started on amiodarone bolus with drip and transitioned to PO dosing.   TTE: LVEF 55-60%, global RV function normal, no significant valvular abnormalities. Briefly required levophed and midodrine for HD but otherwise stable off pressors.   - MAP goal > 65 mmHg   - Continue Metoprolol tartrate dose 25 mg BID (hold parameters if MAP<65 or hr less than 60, hold on dialysis days)  - Continue amiodarone 200 mg PO  - Midodrine with HD as needed  - Restarted Apixaban now that patient not wanting colonoscopy     # Hypothyroidism  Patient with new (24) low T4 at 0.64 and TSH at 6.5, concerning for new hypothyroidism vs sick thyroid syndrome. TSH with appropriate response, more consistent with elevation in the setting of acute illness. ICU team on  recommended initiation of treatment for  possible hypothyroid as this can improve respiratory muscle function in the setting of weakness and malnutrition. 3/7, 3/15, 3/20 repeat thyroid function WNL.  - Continue liothyronine + levothyroxine -- note that prolonged combination therapy is not optimal & regimen should be re-evaluated (likely stop liothyronine, up-titrate levothyroxine) in post-acute setting    # Hx of Anxiety   # Claustrophobia  Reports claustrophobia contributing to limited compliance with BiPAP. Has seen health psych on 3/20, recommended grounding exercise (5-4-3-2-1) for use on BiPAP.   - Zyprexa 5mg at bedtime   - Atarax 25 mg TID PRN for anxiety  - Clonidine 0.1 mg at bedtime    # ESRD on HD TTS   # Mild hyperphosphatemia  Patient is ESRD on T/Th/Sat HD as outpt, now approx M/W/F inpatient. HD tolerating until 3/23. Briefly required extra Monday runs, not since being off TPN. She would prefer longer sessions to more days.  - Midodrine 10mg BID PRN with dialysis days   - Discuss with nephrology if there are additional strategies to optimize bicarb   - Sevelamer per Nephrology   - CBC and CMP daily  - Strict I/Os  - Daily weight   - Renally adjust medications     #Acute on chronic anemia 2/2 ESRD  Hgb stable, with no acute signs of bleeding.   - Daily CBC  - Transfuse for Hgb < 7  - Continue Epogen with dialysis, held in setting of concern for acute infection   - Continue Venofer 50 mcg qweek with dialysis, held in setting of concern for acute infection    # Suspected CARLEE  # PTA nocturnal O2, 2L   - Sleep clinic eval at discharge for suspected CARLEE    # GERD  - PPI BID    # Hyperglycemia, stress induced  Has been euglycemic for the past few days. Not on insulin.  - Hypoglycemia protocol     Diet: Regular Diet Adult  Adult Formula Drip Feeding: Continuous [facility] Renal Support; Jejunostomy; Goal Rate: 35 mL/hr (goal rate) held for 1 hour before/after Synthroid administration, per PharmD; mL/hr; Resume TF @ goal 35 ml/hr  Snacks/Supplements  Adult: Other; Allow patient to order any supplement PRN if requested; Between Meals    DVT Prophylaxis: DOAC  Dong Catheter: Not present  Fluids: None  Lines: None       Cardiac Monitoring: None  Code Status: Full Code      Clinically Significant Risk Factors              # Hypoalbuminemia: Lowest albumin = 2.6 g/dL at 2/18/2024  5:13 AM, will monitor as appropriate               # Severe Malnutrition: based on nutrition assessment    # Financial/Environmental Concerns: none         Disposition Plan   Pending medical stability and home BIPAP/AVAP coordination     The patient's care was discussed with the Attending Physician, Dr. Pantoja .    DELFIN LEDESMA MD  Medicine Service, [facility] TEAM 3  [facility]  Securely message with [employer] (more info)  Text page via [employer] Paging/Directory   See signed in provider for up to date coverage information  ______________________________________________________________________    Interval History   No acute events overnight.  Patient notes no new concerns this morning.  Notes that left ear ache has improved and her hearing is better.  Denies any sore throat or white rhinorrhea.  Denies any new fevers or chills.  States she had the AVAPS on for 8.5 hours overnight.  She notes that her diarrhea has also improved, noting only four, more formed stools yesterday which is better compared to when she was having 2 days before.     Physical Exam   Vital Signs: Temp: 97.8  F (36.6  C) Temp src: Oral BP: 102/57 Pulse: 60   Resp: 18 SpO2: 98 % O2 Device: None (Room air)    Weight: 91 lbs 1.6 oz    General: Awake, alert & oriented. Frail appearing. Laying in dialysis unit  HEENT: Mucous membranes moist  Neuro: Awake and alert, no focal deficits, moving all extremities to command and spontaneously  Pulm/Resp: Clear breath sounds bilaterally, diminished on R>L, no accessory muscle use  CV: RRR, S1/S2 without m/r/g; pedal pulses 2+ without LE  edema  Abdomen: Soft, non-distended, non-tender  : Rectal tube in place, (+) bruit/thrill in LUE fistula  Incisions/Skin: PICC and PEG site without surrounding erythema, no rashes noted    Medical Decision Making       Please see A&P for additional details of medical decision making.      Data   ------------------------- PAST 24 HR DATA REVIEWED -----------------------------------------------    I have personally reviewed the following data over the past 24 hrs:    N/A  \   N/A   / N/A     135 95 (L) 46.3 (H) /  104 (H)   4.5 27 3.14 (H) \     ALT: 10 AST: 16 AP: 115 TBILI: 0.2   ALB: 3.8 TOT PROTEIN: 6.3 (L) LIPASE: N/A     INR:  1.10 PTT:  N/A   D-dimer:  N/A Fibrinogen:  N/A       Imaging results reviewed over the past 24 hrs:   No results found for this or any previous visit (from the past 24 hour(s)).

## 2024-04-08 ENCOUNTER — APPOINTMENT (OUTPATIENT)
Dept: GENERAL RADIOLOGY | Facility: CLINIC | Age: 64
DRG: 640 | End: 2024-04-08
Attending: NURSE PRACTITIONER
Payer: MEDICARE

## 2024-04-08 ENCOUNTER — APPOINTMENT (OUTPATIENT)
Dept: OCCUPATIONAL THERAPY | Facility: CLINIC | Age: 64
DRG: 640 | End: 2024-04-08
Attending: INTERNAL MEDICINE
Payer: MEDICARE

## 2024-04-08 ENCOUNTER — APPOINTMENT (OUTPATIENT)
Dept: INTERVENTIONAL RADIOLOGY/VASCULAR | Facility: CLINIC | Age: 64
DRG: 640 | End: 2024-04-08
Attending: PHYSICIAN ASSISTANT
Payer: MEDICARE

## 2024-04-08 LAB
ALBUMIN SERPL BCG-MCNC: 3.8 G/DL (ref 3.5–5.2)
ALP SERPL-CCNC: 106 U/L (ref 40–150)
ALT SERPL W P-5'-P-CCNC: 8 U/L (ref 0–50)
ANION GAP SERPL CALCULATED.3IONS-SCNC: 15 MMOL/L (ref 7–15)
AST SERPL W P-5'-P-CCNC: 13 U/L (ref 0–45)
BASE EXCESS BLDV CALC-SCNC: 0.7 MMOL/L (ref -3–3)
BASOPHILS # BLD AUTO: ABNORMAL 10*3/UL
BASOPHILS # BLD MANUAL: 0 10E3/UL (ref 0–0.2)
BASOPHILS NFR BLD AUTO: ABNORMAL %
BASOPHILS NFR BLD MANUAL: 0 %
BILIRUB SERPL-MCNC: 0.2 MG/DL
BUN SERPL-MCNC: 80.3 MG/DL (ref 8–23)
CALCIUM SERPL-MCNC: 9.6 MG/DL (ref 8.8–10.2)
CHLORIDE SERPL-SCNC: 96 MMOL/L (ref 98–107)
CREAT SERPL-MCNC: 4.99 MG/DL (ref 0.51–0.95)
DEPRECATED HCO3 PLAS-SCNC: 26 MMOL/L (ref 22–29)
EGFRCR SERPLBLD CKD-EPI 2021: 9 ML/MIN/1.73M2
EOSINOPHIL # BLD AUTO: ABNORMAL 10*3/UL
EOSINOPHIL # BLD MANUAL: 0.8 10E3/UL (ref 0–0.7)
EOSINOPHIL NFR BLD AUTO: ABNORMAL %
EOSINOPHIL NFR BLD MANUAL: 10 %
ERYTHROCYTE [DISTWIDTH] IN BLOOD BY AUTOMATED COUNT: 17.8 % (ref 10–15)
GLUCOSE SERPL-MCNC: 84 MG/DL (ref 70–99)
HCO3 BLDV-SCNC: 30 MMOL/L (ref 21–28)
HCT VFR BLD AUTO: 34.4 % (ref 35–47)
HGB BLD-MCNC: 10.6 G/DL (ref 11.7–15.7)
IMM GRANULOCYTES # BLD: ABNORMAL 10*3/UL
IMM GRANULOCYTES NFR BLD: ABNORMAL %
INR PPP: 1.04 (ref 0.85–1.15)
LYMPHOCYTES # BLD AUTO: ABNORMAL 10*3/UL
LYMPHOCYTES # BLD MANUAL: 0.6 10E3/UL (ref 0.8–5.3)
LYMPHOCYTES NFR BLD AUTO: ABNORMAL %
LYMPHOCYTES NFR BLD MANUAL: 7 %
MAGNESIUM SERPL-MCNC: 2.2 MG/DL (ref 1.7–2.3)
MCH RBC QN AUTO: 34.8 PG (ref 26.5–33)
MCHC RBC AUTO-ENTMCNC: 30.8 G/DL (ref 31.5–36.5)
MCV RBC AUTO: 113 FL (ref 78–100)
MONOCYTES # BLD AUTO: ABNORMAL 10*3/UL
MONOCYTES # BLD MANUAL: 0.4 10E3/UL (ref 0–1.3)
MONOCYTES NFR BLD AUTO: ABNORMAL %
MONOCYTES NFR BLD MANUAL: 5 %
MYELOCYTES # BLD MANUAL: 0.1 10E3/UL
MYELOCYTES NFR BLD MANUAL: 1 %
NEUTROPHILS # BLD AUTO: ABNORMAL 10*3/UL
NEUTROPHILS # BLD MANUAL: 6.2 10E3/UL (ref 1.6–8.3)
NEUTROPHILS NFR BLD AUTO: ABNORMAL %
NEUTROPHILS NFR BLD MANUAL: 77 %
NRBC # BLD AUTO: 0 10E3/UL
NRBC BLD AUTO-RTO: 0 /100
O2/TOTAL GAS SETTING VFR VENT: 30 %
OXYHGB MFR BLDV: 38 % (ref 70–75)
PCO2 BLDV: 75 MM HG (ref 40–50)
PH BLDV: 7.21 [PH] (ref 7.32–7.43)
PHOSPHATE SERPL-MCNC: 6.1 MG/DL (ref 2.5–4.5)
PLAT MORPH BLD: ABNORMAL
PLATELET # BLD AUTO: 292 10E3/UL (ref 150–450)
PO2 BLDV: 27 MM HG (ref 25–47)
POLYCHROMASIA BLD QL SMEAR: SLIGHT
POTASSIUM SERPL-SCNC: 5.3 MMOL/L (ref 3.4–5.3)
PROT SERPL-MCNC: 6.1 G/DL (ref 6.4–8.3)
RBC # BLD AUTO: 3.05 10E6/UL (ref 3.8–5.2)
RBC MORPH BLD: ABNORMAL
SAO2 % BLDV: 39 % (ref 70–75)
SODIUM SERPL-SCNC: 137 MMOL/L (ref 135–145)
WBC # BLD AUTO: 8.1 10E3/UL (ref 4–11)

## 2024-04-08 PROCEDURE — 80053 COMPREHEN METABOLIC PANEL: CPT

## 2024-04-08 PROCEDURE — 99232 SBSQ HOSP IP/OBS MODERATE 35: CPT | Mod: GC | Performed by: HOSPITALIST

## 2024-04-08 PROCEDURE — 85014 HEMATOCRIT: CPT

## 2024-04-08 PROCEDURE — 250N000013 HC RX MED GY IP 250 OP 250 PS 637

## 2024-04-08 PROCEDURE — 999N000157 HC STATISTIC RCP TIME EA 10 MIN

## 2024-04-08 PROCEDURE — C1887 CATHETER, GUIDING: HCPCS

## 2024-04-08 PROCEDURE — 999N000128 HC STATISTIC PERIPHERAL IV START W/O US GUIDANCE

## 2024-04-08 PROCEDURE — C1769 GUIDE WIRE: HCPCS

## 2024-04-08 PROCEDURE — 49452 REPLACE G-J TUBE PERC: CPT | Performed by: PHYSICIAN ASSISTANT

## 2024-04-08 PROCEDURE — 99233 SBSQ HOSP IP/OBS HIGH 50: CPT | Performed by: NURSE PRACTITIONER

## 2024-04-08 PROCEDURE — 49452 REPLACE G-J TUBE PERC: CPT

## 2024-04-08 PROCEDURE — 71046 X-RAY EXAM CHEST 2 VIEWS: CPT

## 2024-04-08 PROCEDURE — 250N000013 HC RX MED GY IP 250 OP 250 PS 637: Performed by: STUDENT IN AN ORGANIZED HEALTH CARE EDUCATION/TRAINING PROGRAM

## 2024-04-08 PROCEDURE — 250N000013 HC RX MED GY IP 250 OP 250 PS 637: Performed by: INTERNAL MEDICINE

## 2024-04-08 PROCEDURE — 97535 SELF CARE MNGMENT TRAINING: CPT | Mod: GO

## 2024-04-08 PROCEDURE — 82805 BLOOD GASES W/O2 SATURATION: CPT

## 2024-04-08 PROCEDURE — 84100 ASSAY OF PHOSPHORUS: CPT | Performed by: STUDENT IN AN ORGANIZED HEALTH CARE EDUCATION/TRAINING PROGRAM

## 2024-04-08 PROCEDURE — 97110 THERAPEUTIC EXERCISES: CPT | Mod: GO

## 2024-04-08 PROCEDURE — 250N000011 HC RX IP 250 OP 636

## 2024-04-08 PROCEDURE — 85007 BL SMEAR W/DIFF WBC COUNT: CPT

## 2024-04-08 PROCEDURE — 120N000002 HC R&B MED SURG/OB UMMC

## 2024-04-08 PROCEDURE — 94660 CPAP INITIATION&MGMT: CPT

## 2024-04-08 PROCEDURE — 83735 ASSAY OF MAGNESIUM: CPT | Performed by: STUDENT IN AN ORGANIZED HEALTH CARE EDUCATION/TRAINING PROGRAM

## 2024-04-08 PROCEDURE — 85610 PROTHROMBIN TIME: CPT | Performed by: STUDENT IN AN ORGANIZED HEALTH CARE EDUCATION/TRAINING PROGRAM

## 2024-04-08 PROCEDURE — 36415 COLL VENOUS BLD VENIPUNCTURE: CPT

## 2024-04-08 PROCEDURE — 250N000012 HC RX MED GY IP 250 OP 636 PS 637

## 2024-04-08 PROCEDURE — 0D2DXUZ CHANGE FEEDING DEVICE IN LOWER INTESTINAL TRACT, EXTERNAL APPROACH: ICD-10-PCS | Performed by: INTERNAL MEDICINE

## 2024-04-08 PROCEDURE — 255N000002 HC RX 255 OP 636: Performed by: STUDENT IN AN ORGANIZED HEALTH CARE EDUCATION/TRAINING PROGRAM

## 2024-04-08 PROCEDURE — 71046 X-RAY EXAM CHEST 2 VIEWS: CPT | Mod: 26 | Performed by: RADIOLOGY

## 2024-04-08 PROCEDURE — 250N000012 HC RX MED GY IP 250 OP 636 PS 637: Performed by: NURSE PRACTITIONER

## 2024-04-08 RX ORDER — IOPAMIDOL 510 MG/ML
100 INJECTION, SOLUTION INTRAVASCULAR ONCE
Status: COMPLETED | OUTPATIENT
Start: 2024-04-08 | End: 2024-04-08

## 2024-04-08 RX ADMIN — APIXABAN 2.5 MG: 2.5 TABLET, FILM COATED ORAL at 11:06

## 2024-04-08 RX ADMIN — SEVELAMER CARBONATE 0.8 G: 800 POWDER, FOR SUSPENSION ORAL at 20:06

## 2024-04-08 RX ADMIN — IOPAMIDOL 20 ML: 510 INJECTION, SOLUTION INTRAVASCULAR at 10:42

## 2024-04-08 RX ADMIN — LEVOTHYROXINE SODIUM 25 MCG: 0.03 TABLET ORAL at 11:06

## 2024-04-08 RX ADMIN — METOPROLOL TARTRATE 25 MG: 25 TABLET, FILM COATED ORAL at 11:06

## 2024-04-08 RX ADMIN — LIDOCAINE 4% 1 PATCH: 40 PATCH TOPICAL at 20:03

## 2024-04-08 RX ADMIN — Medication 2.5 MCG: at 20:08

## 2024-04-08 RX ADMIN — CYCLOSPORINE 100 MG: 100 SOLUTION ORAL at 18:24

## 2024-04-08 RX ADMIN — SEVELAMER CARBONATE 0.8 G: 800 POWDER, FOR SUSPENSION ORAL at 11:05

## 2024-04-08 RX ADMIN — METOPROLOL TARTRATE 25 MG: 25 TABLET, FILM COATED ORAL at 20:05

## 2024-04-08 RX ADMIN — AMIODARONE HYDROCHLORIDE 200 MG: 200 TABLET ORAL at 11:06

## 2024-04-08 RX ADMIN — CALCIUM CARBONATE 600 MG (1,500 MG)-VITAMIN D3 400 UNIT TABLET 1 TABLET: at 11:06

## 2024-04-08 RX ADMIN — LOPERAMIDE HCL 4 MG: 1 SOLUTION ORAL at 22:07

## 2024-04-08 RX ADMIN — Medication 40 MG: at 20:05

## 2024-04-08 RX ADMIN — CLONIDINE HYDROCHLORIDE 0.1 MG: 0.1 TABLET ORAL at 22:07

## 2024-04-08 RX ADMIN — CYCLOSPORINE 100 MG: 100 SOLUTION ORAL at 11:09

## 2024-04-08 RX ADMIN — PREDNISONE 5 MG: 5 TABLET ORAL at 11:06

## 2024-04-08 RX ADMIN — OLANZAPINE 5 MG: 5 TABLET, ORALLY DISINTEGRATING ORAL at 22:07

## 2024-04-08 RX ADMIN — APIXABAN 2.5 MG: 2.5 TABLET, FILM COATED ORAL at 20:05

## 2024-04-08 RX ADMIN — CALCIUM CARBONATE 600 MG (1,500 MG)-VITAMIN D3 400 UNIT TABLET 1 TABLET: at 18:18

## 2024-04-08 RX ADMIN — ONDANSETRON 4 MG: 2 INJECTION INTRAMUSCULAR; INTRAVENOUS at 00:18

## 2024-04-08 RX ADMIN — Medication 40 MG: at 11:05

## 2024-04-08 RX ADMIN — Medication 2.5 MCG: at 11:08

## 2024-04-08 ASSESSMENT — ACTIVITIES OF DAILY LIVING (ADL)
ADLS_ACUITY_SCORE: 33
ADLS_ACUITY_SCORE: 30
ADLS_ACUITY_SCORE: 33
ADLS_ACUITY_SCORE: 33
ADLS_ACUITY_SCORE: 30
ADLS_ACUITY_SCORE: 33

## 2024-04-08 NOTE — PLAN OF CARE
ICU End of Shift Summary. See flowsheets for vital signs and detailed assessment.    Changes this shift: Patient A&O x4. VSS. Afebrile. PEG tube replaced this morning and TF restarted at goal rate of 35ml/hr. Independent in room, up to commode multiple times today, 4 stools today. CO2 remains elevated - tidal volume increased to 425.     Plan: Transfer when bed available       Goal Outcome Evaluation:       Plan of Care Reviewed With: patient    Overall Patient Progress: no changeOverall Patient Progress: no change    Outcome Evaluation: LORI JULIEN.

## 2024-04-08 NOTE — PROGRESS NOTES
Care Management Follow Up    Length of Stay (days): 58    Expected Discharge Date: 04/09/2024     Concerns to be Addressed: discharge planning, other (see comments) (New TPN)     Patient plan of care discussed at interdisciplinary rounds: Yes    Anticipated Discharge Disposition: Transitional Care, Home Care, Dialysis Services     Anticipated Discharge Services: None  Anticipated Discharge DME: None    Patient/family educated on Medicare website which has current facility and service quality ratings:    Education Provided on the Discharge Plan: Yes  Patient/Family in Agreement with the Plan: yes    Referrals Placed by CM/SW: External Care Coordination  Private pay costs discussed: TBD     Additional Information:  [facility] Faxed discharge orders for bipap to   [facility]  Fax: [phone], phone [phone] (Ashish - rep). Awaiting for the rep to review orders and medical records.     Sent message via Teams to [facility] for TF formula and supplies.    Prior to hospitalization services:  [facility]  Phone # [phone]  Fax # [phone]   Home Enteral     Dialysis:  [facility]:  Chair time of 11:45 AM T/TH/Sat  Phone number: [phone]  Fax: [phone]     [facility] - [phone] - for dialysis     Home [address] [employer] home oxygen:   Fax: [phone]   Phone: [phone] / [phone] ( Ashish)       Angela Johnson, MSN, MA, CCM, RN  4C/4A/4E ICU Care Coordinator  Phone:[phone]  Pager: [phone]    For Weekend & Holiday on call RN Care Coordinator:  [facility] ([phone]) Saturday & Sunday; ([phone]) [facility] Recognized Holidays   Weekend 4C/4A/4E ICUs /6A Care Coordinator  Pager: [phone]

## 2024-04-08 NOTE — PROCEDURES
Interventional Radiology Brief Post Procedure Note    Procedure: IR GASTRO JEJUNOSTOMY TUBE CHANGE    Proceduralist: Bo Capone PA-C    Assistant: None    Time Out: Prior to the start of the procedure and with procedural staff participation, I verbally confirmed the patient s identity using two indicators, relevant allergies, that the procedure was appropriate and matched the consent or emergent situation, and that the correct equipment/implants were available. Immediately prior to starting the procedure I conducted the Time Out with the procedural staff and re-confirmed the patient s name, procedure, and site/side. (The Joint Commission universal protocol was followed.)  Yes    Medications   Medication Event Details Admin User Admin Time       Sedation: None. Local Anesthestic used    Findings: Completed fluoroscopy-guided replacement of 18 Ukrainian 45 cm gastrojejunostomy tube. Tube is ready for immediate use.    Estimated Blood Loss: Minimal    Fluoroscopy Time:  5.8 minute(s)    SPECIMENS: None    Complications: 1. None     Condition: Stable    Plan: Follow-up per primary team.     Comments: See dictated procedure note for full details.    Bo Capone PA-C

## 2024-04-08 NOTE — PROGRESS NOTES
[facility]    Progress Note - Medicine Service, NILE TEAM 3       Date of Admission:  2/10/2024    Assessment & Plan   Sofie Rodriguez is a 63 year old female with PMH COPD s/p bilateral lung transplant 6/28/22 c/b hemidiaphragm palsy and recurrent pneumonias, gastroparesis and small bowel dysmotility complicated by severe malnutrition now s/p PEG/J, ESRD on M/W/F HD, recent R femoral fx s/p ORIF, chronic diarrhea, recurrent c-diff, failure to thrive with inability to tolerate any tube feeds, who was admitted to [facility] on 2/10/24 for concerns over malnutrition and TPN initiation via portacath. Course complicated by recurrent and progressive acute on chronic hypoxic and hypercarbic respiratory failure requiring multiple ICU admissions and intubation 2/18-2/19, 2/28-2/29, 3/18-3/20, for continuous BiPAP. Again with worsening respiratory acidosis and hypercarbia, concern for infection vs acute rejection, requiring transfer back to ICU 3/20/2024 for intubation and bronchoscopy. Weaned off ventilator to RA and BIPAP at night. Transferred to medicine on 4/4/2024.     Changes today:   -pCO2 increasing despite increased Tv overnight and 9 hrs of AVAPS use  -Working with SW to get home BIPAP machine brought to hospital  -PEGJ replacement per IR today in the AM  -Following pulm recs, increase Tv to 425 given rising hypercapnia  -Pulm discussed with renal, increasing bicarb bath during dialysis     # Acute on chronic hypoxic and hypercarbic respiratory failure requiring intubation 2/17-2/19 3/25-4/2, improved  # Recurrent PNAs, concern for acute infection vs acute rejection  # S/p BSLT 6/8/22 for COPD  # R hemidiaphragm palsy   # Positive DSA   Hx bilateral lung transplant on 6/28/2022 for COPD.  Initially admitted on 2/10/2024 for initiation of TPN.  However she was admitted to the ICU on 2/17/2024 for hypoxic hypercarbic respiratory failure.  She was intubated on  2/18 - 2/19 due to pulmonary edema vs infection.  Was extubated onto BiPAP/AVAPS with improvement in mentation however continued to have respiratory acidosis.She was transferred to medicine floor on 2/29.  Was transferred back to the ICU on 3/18 - 3/20 due to hypercarbia and severe respiratory acidosis that did not require intubation.  Patient again experience worsening respiratory acidosis and hypercarbia will concern for infection VS acute rejection that required transfer back to ICU on 3/20/2024 for intubation and bronch.  She was weaned off the ventilator to room air and BiPAP at night.  Transferred to medicine on 4//2024.  She has had imaging to evaluate neurologic causes of decreased respiratory drive, without any notable findings.  Mentation remained stable despite low pH and high pCO2.  Volume status has improved with hemodialysis.  Overall her pCO2 retention is thought to be multifactorial, with a component of overfeeding through TPN, infection, less bicarb loss through GI sources with improving diarrhea, and intolerance due to claustrophobia.  - Daily VBG with adjustments to NIPPV   - Transplant pulm following, appreciate recs  - Immunosuppression:   >Prednisone 5 mg every morning, 2.5 mg every afternoon  >Cyclosporine 100mg BID (Stopped tacrolimus 3/10)   >Overnight oximetry study suggestive of O2 vs CPAP need, as did require up to 2LPM overnight to prevent hypoxia  >Minimize O2 to preserve respiratory drive, given IVIG for DSA+   >D/c'd theophylline 3/3/24 due to difficulty attaining therapeutic levels (started by ICU), could consider Modafinil   - Airway clearance/nebs:  --> resume BID scheduled if secretions worsen/thicken  - Xopenex neb BID PRN  - Mucomyst stopped 4/4   - Volume removal with iHD   - Increased bicarb bath with dialysis on 4/9/2024  - NIPPV settings   - AVAPS at Tv 425, 12 minimum mmHg and EPAP 7  - BIPAP/AVAPS at hs and naps at all times. Ideally 7-8 hours overnight, limited by  claustrophobia, medications as below  - Zyprexa 5mg at bedtime   - Atarax 25 mg TID PRN for anxiety  - Clonidine 0.1 mg at bedtime  - High risk for reintubation given hx of hypercarbia   - Will need bipap machine for home prior to discharge   - Working with CM/SW     L Ear pain and muffled hearing, improving  Rhinorrhea, resolved  Patient notes 3 days of L ear pain and mild rhinorrhea without cough or worsening SOB over the last 2-3 days. No systemic signs of new infection. No discharge from L ear on exam. Moderate ear wax burden. No bleeding or erythema. Low concern for acute otitis media. Could be a viral URI vs discomfort from using BIPAP/AVAPs more consistently leading to pressure imbalance across her tympanic membrane.   - CTM for now  - Daily fluticasone nasal sprays started on 4/7/2024     # Goals of Care  - 3/15 Care conference on with Transplant Pulm, Pall Care, Medicine, daughters (Julia Nash) and Transplant SW. Overall medical updates provided and Qs answered. With declining respiratory acidosis on labs, discussed code status 3/18. Patient initially not desiring any escalation of her care to ICU/intubation with frustration re overall course. However, after discussing with her daughter on the phone she and family elected to remain full code and agreed to ICU admission and intubation if necessary.   - 3/29 Care conference: Laid out a few options for family and patient to consider with qs answered. Examples being we could attempt to extubate to BiPAP but plan should be established prior to extubation that if patient decompensates and requires reintubation then likely pursue trach versus more comfort approach. Other option is going straight for trach now. Patient and family (daughter Julia and son present on Ipad) considering the options and had a lot of questions about trach and what that would look like long-term, which was laid out for the family and patient.   - Pt made the decision this  hospitalization, that if needed down the line she would be acceptable of a tracheostomy     # Chronic osmotic diarrhea/SIBO s/p Rifaximin  # Recurrent C.Diff (3rd ep 3/12/24)    # Diarrhea, improving  Recurrent C.diff 3/12, Fidaxomicin x 10 days (3/13-3/22), with improvement in diarrhea. Transplant pulm requesting repeat colonoscopy w/ Bx after completion of c.diff treatment, to r/o toxicity or other reason that diarrhea is present.  C.diff and enteric panel on 4/4/2024 was unremarkable.  Patient initially agreeable to colonoscopy, however later noted that she would like to defer colonoscopy for concerns regarding her weight and reintubation for the procedure. Diarrhea improving with PRN imodium.   - GI consult for recurrent diarrhea in the MICU, appreciate recs   - C.diff and enteric panel negative  - Confirmed Osmotic diarrhea with stool studies--> Likely not infectious, continue adjusting TF and loperamide PRN  - Bowel regimen PRN  - Imodium PRN     # Severe malnutrition   # Failure to thrive  # Hypoalbuminemia  # Gastroparesis, small bowel dysmotility  # S/P PEG/J with intolerance of enteral nutrition  Patient with gastroparesis (presumed due to vagal injury) and small bowel dysmotility complicated by unintentional 40lb weight loss over the past year and now severe malnutrition. Previously intolerant to oral food intake due to nausea. Was initially admitted for portacath and TPN initiation since 2/13. Intermittently tolerating feeds without n/v from 2/20.  Per GI, no ongoing concerns for SIBO as of 2/23. As of 3/11 transplant pulmonology is worried that TPN is not a realistic plan to continue at home and transitioned back to TF (RD oncerned pt is not absorbing any oral intake). TPN discontinued 3/16. Transplant pulm also worried about mucosal toxicity.   - Metabolic cart 4/5/24 following 24 hours of calorie counts   - Nutrition consulted, appreciate assistance  - Continue TF at goal rate 35 ml/hr via PEG/J  -  Speech saw pt 4/3 okay for regular diet with thin liquids; will continue pt tube feeds via pts g/j tube (hx of gastroparesis requiring TPN) until pt able to take in adequate PO nutrition          - PEGJ dislodged on 2024 and replaced with IR on 2024    # Recurrent pneumonia, concern for acute infection vs rejection  # Recurrent C.Diff colitis (3rd ep 3/12/24)  # Hx EBV viremia   # Hypogammaglobulinemia  # Chronic immunosuppression / lung transplant  Empirically treated for pneumonia  - , RVP and cultures no growth to date. Recurrent C.Diff Positive 3/12, s/p PO Fidaxomicin 200 mg BID for 10 days (3/13-3/22) with improvement in diarrhea. Remains afebrile, leukocytosis resolved.  Ig, s/p IVIG.  EBV 27K (decreased compared to prior).     - Follow up BAL and blood cultures from 3/24, NGTD     Antibiotics:  Zosyn ( - , 3/24 - )   Bactrim (MWF, PJP ppx, previously on Dapsone)  Vancomycin ( - , 3/24-3/25)  Micafungin (- , 3/24 x1)  Fidaxomicin (3/13-3/22)     Micro:   - BAL 3/24:   - Cell count w diff: PMN 75%, lymphocytes 5, monocytes 19, eos 1  - No organisms seen on Gram stain  - RVP, HSV, Histoplasma neg  - Fungal & bacterial cultures NGTD  - EBV, CMV, PJP, Aspergillus, Legionella, Mycoplasma, AFB in process  - Bcx 3/24 NGTD    Chronic issues    # A-flutter (recurrent on 3/17)  # A-fib, intermittent  # HTN  # HFpEF  Noted Afib/flutter intermittently throughout admission. Was started on amiodarone bolus with drip and transitioned to PO dosing.   TTE: LVEF 55-60%, global RV function normal, no significant valvular abnormalities. Briefly required levophed and midodrine for HD but otherwise stable off pressors.   - MAP goal > 65 mmHg   - Continue Metoprolol tartrate dose 25 mg BID (hold parameters if MAP<65 or hr less than 60, hold on dialysis days)  - Continue amiodarone 200 mg PO  - Midodrine with HD as needed  - Restarted Apixaban now that patient not  wanting colonoscopy     # Hypothyroidism  Patient with new (2/17/24) low T4 at 0.64 and TSH at 6.5, concerning for new hypothyroidism vs sick thyroid syndrome. TSH with appropriate response, more consistent with elevation in the setting of acute illness. ICU team on 2/28 recommended initiation of treatment for possible hypothyroid as this can improve respiratory muscle function in the setting of weakness and malnutrition. 3/7, 3/15, 3/20 repeat thyroid function WNL.  - Continue liothyronine + levothyroxine -- note that prolonged combination therapy is not optimal & regimen should be re-evaluated (likely stop liothyronine, up-titrate levothyroxine) in post-acute setting    # Hx of Anxiety   # Claustrophobia  Reports claustrophobia contributing to limited compliance with BiPAP. Has seen health psych on 3/20, recommended grounding exercise (5-4-3-2-1) for use on BiPAP.   - Zyprexa 5mg at bedtime   - Atarax 25 mg TID PRN for anxiety  - Clonidine 0.1 mg at bedtime    # ESRD on HD TTS   # Mild hyperphosphatemia  Patient is ESRD on T/Th/Sat HD as outpt, now approx M/W/F inpatient. HD tolerating until 3/23. Briefly required extra Monday runs, not since being off TPN. She would prefer longer sessions to more days.  - Midodrine 10mg BID PRN with dialysis days   - Discuss with nephrology if there are additional strategies to optimize bicarb   - Sevelamer per Nephrology   - CBC and CMP daily  - Strict I/Os  - Daily weight   - Renally adjust medications     #Acute on chronic anemia 2/2 ESRD  Hgb stable, with no acute signs of bleeding.   - Daily CBC  - Transfuse for Hgb < 7  - Continue Epogen with dialysis, held in setting of concern for acute infection   - Continue Venofer 50 mcg qweek with dialysis, held in setting of concern for acute infection    # Suspected CARLEE  # PTA nocturnal O2, 2L   - Sleep clinic eval at discharge for suspected CARLEE    # GERD  - PPI BID    # Hyperglycemia, stress induced  Has been euglycemic for the  past few days. Not on insulin.  - Hypoglycemia protocol     Diet: Adult Formula Drip Feeding: Continuous Eneida Stoll Renal Support; Jejunostomy; Goal Rate: 35 mL/hr (goal rate) held for 1 hour before/after Synthroid administration, per PharmD; mL/hr; Resume TF @ goal 35 ml/hr  Snacks/Supplements Adult: Other; Allow patient to order any supplement PRN if requested; Between Meals  Renal Diet (dialysis)    DVT Prophylaxis: DOAC  Dong Catheter: Not present  Fluids: None  Lines: None       Cardiac Monitoring: None  Code Status: Full Code      Clinically Significant Risk Factors              # Hypoalbuminemia: Lowest albumin = 2.6 g/dL at 2/18/2024  5:13 AM, will monitor as appropriate               # Severe Malnutrition: based on nutrition assessment    # Financial/Environmental Concerns: none         Disposition Plan   Pending medical stability and home BIPAP/AVAP coordination     The patient's care was discussed with the Attending Physician, Dr. Pantoja .    DELFIN LEDESMA MD  Medicine Service, [address]  Securely message with [employer] (more info)  Text page via [employer] Paging/Directory   See signed in provider for up to date coverage information  ______________________________________________________________________    Interval History   Patient had PEG J-tube followed overnight.  This was managed by placing an 18 [place] Dong catheter in the stoma with bandages on top.  No other complaints in the morning.  Patient noted she had tolerated AVAPS for 9 hours without issue.  Notes no new chest pain or trouble breathing.  Denies any abdominal pain around the PEG-J site.  Discussed continued hypercarbia and why it might be increasing.     Physical Exam   Vital Signs: Temp: 98  F (36.7  C) Temp src: Oral BP: 110/60 Pulse: 80   Resp: 18 SpO2: 100 % O2 Device: None (Room air)    Weight: 91 lbs 1.6 oz    General: Awake, alert & oriented. Frail appearing. Laying in dialysis  unit  HEENT: Mucous membranes moist  Neuro: Awake and alert, no focal deficits, moving all extremities to command and spontaneously  Pulm/Resp: Clear breath sounds bilaterally, diminished on R>L, no accessory muscle use  CV: RRR, S1/S2 without m/r/g; pedal pulses 2+ without LE edema  Abdomen: Soft, non-distended, non-tender, in the morning 18 Fr in place with dressings on top without leakage or drainage.  : Rectal tube in place, (+) bruit/thrill in LUE fistula  Incisions/Skin: PICC and PEG site without surrounding erythema, no rashes noted    Medical Decision Making       Please see A&P for additional details of medical decision making.      Data   ------------------------- PAST 24 HR DATA REVIEWED -----------------------------------------------    I have personally reviewed the following data over the past 24 hrs:    8.1  \   10.6 (L)   / 292     137 96 (L) 80.3 (H) /  84   5.3 26 4.99 (H) \     ALT: 8 AST: 13 AP: 106 TBILI: 0.2   ALB: 3.8 TOT PROTEIN: 6.1 (L) LIPASE: N/A     INR:  1.04 PTT:  N/A   D-dimer:  N/A Fibrinogen:  N/A       Imaging results reviewed over the past 24 hrs:   Recent Results (from the past 24 hour(s))   XR Chest 2 Views    Narrative    Exam: XR CHEST 2 VIEWS, 4/8/2024 8:30 AM    Indication: Interval f/u    Comparison: Chest radiographs 4/11/2024, 3/29/2020 oh    Findings:   PA and lateral views of the chest were obtained. Atherosclerotic  calcification at the aortic knob. Postsurgical changes clamshell  sternotomy and bilateral lung transplantation are again visualized.  Trachea is midline. Cardiomegaly, slightly decreased compared to  4/1/2024, and there is prominent pulmonary convexity suggesting  pulmonary enlargement. This was actually confirmed on review of recent  chest CT of 3/23/2024. Also, the left atrial border is quite close to  the spine an review of that CT as well show left atrial enlargement.  Stable small left pleural effusion. Stable please correlate for  cardiac  arrhythmia. Bilateral interstitial opacities. No focal  consolidation. No pneumothorax. Upper abdomen is unremarkable.  Partially visualized and catheter projects over the mid abdomen. No  acute osseous abnormalities.      Impression    Impression:   1. Similar appearing small left pleural effusion.  2. Stable bilateral pulmonary interstitial opacities suggestive of  pulmonary edema.  3. Left atrial enlargement, he has correlate for arrhythmia.  4. Pulmonary artery enlargement complex choroid for pulmonary  hypertension.  5. Atherosclerosis.    I have personally reviewed the examination and initial interpretation  and I agree with the findings.    ALVINA HARRY MD         SYSTEM ID:  [id number]

## 2024-04-08 NOTE — CONSULTS
"      [facility] Consult Service Note  Interventional Radiology  04/08/24   7:05 AM    Consult Requested: \"PEGJ fell out, pt with gastroparesis 2/2 lung transplant. Consulting for replacement/temporizing measures to prevent closure.\"    Recommendations/Plan:    Patient is approved for IR GJ tube exchange.    Timing of procedure is TBD based on IR staffing/schedule and triage.  Please contact the IR charge RN at [phone] for estimated time of procedure.     Labs WNL for procedure.    Orders entered for procedure and NPO status.  Medications to be held include: N/A  Informed consent will be completed prior to procedure.     Case and imaging discussed with IR attending Dr. Manpreet Paul MD   Recommendations were reviewed with Lachelle Skinner MD.    History of Present Illness:  Sofie Rodriguez is a 63 year old female with PMH COPD s/p bilateral lung transplant 6/28/22 c/b hemidiaphragm palsy and recurrent pneumonias, gastroparesis and small bowel dysmotility complicated by severe malnutrition now s/p PEG/J, ESRD on M/W/F HD,  R femoral fx s/p ORIF, chronic diarrhea, recurrent c-diff, failure to thrive with inability to tolerate any tube feeds. Pt has had worsening respiratory acidosis and hypercarbia, concern for infection vs acute rejection, requiring transfer back to ICU 3/20/2024 for intubation and bronchoscopy. Weaned off ventilator to RA and BIPAP at night. Jelly's last exchange of GJ occurred on 11/14/23 at [facility]. Per report, GJ fell out, montgomery catheter is secured in track.     Pertinent Imaging Reviewed:   IR GJ tube change 11/14/23    Expected date of discharge:  04/09/2024    Vitals:   /67   Pulse 80   Temp 98.3  F (36.8  C) (Oral)   Resp 18   Ht 1.57 m (5' 1.81\")   Wt 41.3 kg (91 lb 1.6 oz)   SpO2 98%   BMI 16.76 kg/m      Pertinent Labs Reviewed:  CBC:  Lab Results   Component Value Date    WBC 8.1 04/08/2024    WBC 7.6 04/06/2024    WBC 7.8 04/06/2024    WBC 5.8 06/30/2021    " WBC 5.2 06/14/2021     Lab Results   Component Value Date    HGB 10.6 04/08/2024    HGB 9.5 04/06/2024    HGB 9.8 04/06/2024    HGB 11.8 06/30/2021    HGB 12.9 06/30/2021    HGB 12.5 06/14/2021     Lab Results   Component Value Date     04/08/2024     04/06/2024     04/06/2024     06/30/2021     06/14/2021    INR:  Lab Results   Component Value Date    INR 1.04 04/08/2024    INR 0.9 05/12/2023    INR 0.93 06/14/2021    PTT 25 08/12/2022    PTT 28 06/14/2021          COVID Results:  COVID-19 Antibody Results, Testing for Immunity          4/29/2022    11:46   COVID-19 Antibody Results, Testing for Immunity   SARS-CoV-2 Nucleocapsid Total Ab, S Negative      COVID-19 PCR Results          9/9/2022    18:11 9/21/2022    14:42 9/29/2022    09:50 11/30/2022    10:08 12/16/2022    08:02 3/6/2023    23:04 5/17/2023    17:01 2/11/2024    17:53 2/17/2024    18:20   COVID-19 PCR Results   COVID-19 Virus PCR to [facility] - Result     Undetected           COVID-19 Virus by PCR (External Result)    Negative     Undetected     Negative          SARS CoV2 PCR Negative  Negative  Negative     Negative  Negative  Negative        3/18/2024    17:28   COVID-19 PCR Results   COVID-19 Virus PCR to [facility] - Result    COVID-19 Virus by PCR (External Result)    SARS CoV2 PCR Negative       Details          This result is from an external source.            Potassium   Date Value Ref Range Status   04/08/2024 5.3 3.4 - 5.3 mmol/L Final   06/30/2021 4.4 3.4 - 5.3 mmol/L Final     Potassium Whole Blood   Date Value Ref Range Status   02/18/2024 2.8 (L) 3.4 - 5.3 mmol/L Final          Nora Prado PA-C  Interventional Radiology  Pager: [phone]

## 2024-04-08 NOTE — PLAN OF CARE
Goal Outcome Evaluation:       ICU End of Shift Summary. See flowsheets for vital signs and detailed assessment.    Changes this shift: Patient is A&Ox4.  Patient is independent in the room.  She is able to take off BiPap for water and going to commode on own and get everything back on.  Patient didn't have her PEG tube and so refused her oral meds at bedtime.  Patient slept well tonight.  She states she feels well.    Plan:   Replace PEG tube today.  Get home BiPap delivered here and use at nights.  Continue to monitor.

## 2024-04-08 NOTE — PROGRESS NOTES
Pulmonary Medicine  Cystic Fibrosis - Lung Transplant Team  Progress Note  2024     Patient: Sofie Rodriguez  MRN: [id number]  : 1960 (age 63 year old)  Transplant: 2022 (Lung), POD#650  Admission date: 2/10/2024    Assessment & Plan:     Sofie Rodriguez is a 63 year old female with h/o COPD s/p BSLT () with course complicated by post-operative hemidiaphragm palsy, recurrent PNAs, positive DSA, EBV viremia, hypogammaglobulinemia, severe gastroparesis s/p G/J tube placement (22) and pyloric botox (23), GI bleed /2 pyloric ulcer, hemobilia s/p ERCP and MRCP, chronic diarrhea, recurrent C diff colitis, ESRD on iHD, recurrent falls with injuries (recent right hip fx s/p ORIF 2023), deconditioning, and FTT.  Admitted 2/10 for failure to thrive and initiation of TPN/lipids.  Emergent intubation - for hypoxic and hypercapneic respiratory failure and encephalopathy. Returned to ICU - and again 3/18-3/20 d/t tenuous respiratory status complicated by variable daytime NIPPV tolerance and hypervolemia with iHD limited d/t hypotension. Again transferred back to ICU 3/24 for intubation and bronch/BAL given hypoxic and hypercapneic respiratory failure. CT with extensive bilateral infiltrate and etiology likely multifactorial including volume overload and infection, possible mucous plugging, ACR or AMR less likely.  Extubated , no daytime hypoxia and hypercapnia remains stable with good adherence to NIPPV with sleep.  Tolerating PO but with persistent diarrhea, improving some.  Discharge pending stability on home NIPPV machine, dispo per therapy.      Today's recommendations:  - Daily VBG, goal for permissive hypercapnea to mid 60's  - AVAPS to continue tonight, increase TV to 425 ml (continue 12 minimum mmHg and EPAP 7)  - Strict adherence to NIPPV overnight and with all naps, primary team to obtain home NIPPV machine and work to transition to long-term device while  inpatient  - Dialysis per renal team, discussed at length today and will trial increase in bicarb bath tomorrow to see if this helps with slowly rising carbon dioxide (despite up-titration of TV)  - Repeat CXR today  - CSA level 4/9 (ordered)  - DSA and EBV due 4/23  - Strongly encourage TID supplemental shakes daily to meet PO nutritional goals  - PEG/J tube replacement in IR today (fell out yesterday)     Acute on chronic hypoxic/hypercapneic respiratory failure:  S/p bilateral sequential lung transplant for COPD:   Hypoventilation, Suspected CARLEE:  PFTs in clinic remained significantly below her baseline.  Baseline hypoxia with 2L NC overnight PTA.  S/p intubation 2/17-2/19 for acute hypoxic/hypercapneic respiratory failure with encephalopathy, CT with concern for infection and pulmonary edema given recent addition of TPN nutrition.  Bronch (2/18) with very friable tissue, cutlures only with C. glabrata.  Persistent respiratory failure also complicated by hypoventilation and deconditioning d/t malnutrition. Neurology consulted 2/27, MRI brain (3/1) without acute intracranial pathology.  SNIFF (3/8) normal.  Metabolic CART suggested overfeeding on TPN.  Returned to ICU (3/18-3/20) d/t tenuous respiratory status complicated by NIPPV intolerance and hypervolemia with iHD limited d/t hypotension (CXR with increased pulmonary edema).  Overall, her PCO2 retention is likely multifactorial with a component being from overfeeding (though remedied with nutrition adjustment), metabolic compensation barrier d/t renal failure, pulmonary edema, bicarb loss with diarrhea, hypoventilation with declining NIF and FVC concerning for neuromuscular etiology (though Neurology consult was not revealing), and NIPPV intolerance due to claustrophobia. Worsening hypoxic and hypercapneic respiratory failure 3/24 and transferred to ICU for intubation. CT chest with extensive bilateral infiltrates markedly increased from previous.  Bronch with  small L>R sided secretions easily suctioned, BAL with no evidence of DAH, non bloody.  S/p Zosyn (3/23-4/2) for 10 day empiric course.  Extubated 4/2, hypercapnia stable with consistent overnight BiPAP use through 4/5.  Increased hypercapnia since 4/6 associated with decreased documented TV on BiPAP the night prior, transitioned to AVAPS and up-titrating TV but hypercapnia persists.  - Nebs and Volera QID stopped 4/4 per MICU, monitor closely for need to resume nebs (sticky, tenacious secretions)  - Daily VBG, goal for permissive hypercapnea to mid 60's  - AVAPS to continue tonight, increase TV to 4** ml (continue 12 minimum mmHg and EPAP 7); ideal body weight for height up to 130# correlating with upper limit of TV at 8 ml/kg at 470 ml  - Strict adherence to NIPPV overnight and with all naps, primary team to obtain home NIPPV machine and work to transition to long-term device while inpatient  - Repeat CXR today with stable bibasilar opacities and small left pleural effusion (personally reviewed)  - Dialysis per renal team, discussed at length today and will trial increase in bicarb bath tomorrow to see if this helps with slowly rising carbon dioxide (despite up-titration of TV)     Immunosuppression:  AZA previously stopped 5/2023 d/t low ImmuKnow assay and EBV viremia.  ImmuKnow (2/27) remained low at 88.  ImmuKnow (4/1) further decreased to 43.  Transitioned from [place] to CSA 3/11.  -  BID (decreased 4/3).  Goal 120-140 (decreased 4/4 for ImmuKnow).  Repeat level 4/9 (ordered).  - Prednisone 5 mg daily     Prophylaxis:   - Bactrim qMWF for PJP ppx  - CMV ppx not currently indicated, D+/R+, CMV negative 3/18     Positive DSA: AMR treatment deferred given frailty and stable PFTs.  DSA DQB2 decreased on 3/6 with 5667 mfi, and again decreased to 4960 on 3/23.  Most recent cell-free DNA (2/18) mildly elevated at 1.04 (concerning for possible rejection), which was increased from prior level of 0.12 (1/10).  IST  increase deferred at that time per Dr. Gong.  S/p IVIG (2/27) for DSA (IgG WNL).  Immuknow as above.  Prospera (cell free DNA) 0.44 (3/18) suggests AMR less likely, follow  - Repeat DSA monthly (due 4/23)      EBV viremia: CT CAP (2/7) without lymphadenopathy.  Recent levels improving (3/18) 23k.  - Repeat EBV due 4/23     Other relevant problems being managed by the primary team:      FTT:  Severe protein calorie malnutrition:  Gastroparesis s/p PEG/J, botox, and G-POEM:  SB hypomotility:  Pyloric ulcer:  Chronic nausea and osmotic diarrhea:  SIBO s/p rifaximin:   Recurrent C diff colitis: Chronic osmotic and infectious diarrhea since transplant with recurrent episodes of C diff.  Notable weight loss (40# in a year) d/t diarrhea, GI dysmotility, and intolerance of enteral feeds (PEG/J tube in place), most recently on elemental formula.  Extensive OP eval and f/u with GI. S/p port placement for TPN and lipids. Continued for ~5 weeks inpatient without considerable improvement, transitioned back to TF.  C diff positive (3/13), on fidaxomicin.  Repeat (4/4) negative, enteric B/V panel also negative.  Loose stools persist, GI consulted 4/4.  Colonoscopy recommended by GI this admission, but pt. deferred d/t risk for reintubation and improvement in diarrhea today.  - Strongly encourage TID supplemental shakes daily to meet PO nutritional goals  - PEG/J tube replacement in IR today (fell out yesterday)     ESRD: CT with volume overload secondary to TPN volume, iHD increased from PTA TThSa schedule with unsustained improvement.  Management per Nephrology, dialysis via [facility].  Unable to remove fluid on 3/23 d/t hypotension, tolerance now improved with midodrine on HD days.  Volume removal and dialysis per nephrology.     Goals of care: Care conference held with palliative and primary team on 3/15 for medical update with pt. and daughters.  Repeat care conference 3/29 (see palliative and MICU notes) patient would like  to proceed with re-intubation and trach if pt. fails extubation d/t progressive hypercapnia, understanding that this would severely complicate potential disposition options.     We appreciate the excellent care provided by the [place] [id number] team.  Recommendations communicated via [employer] and this note.  Will continue to follow along closely, please do not hesitate to call with any questions or concerns.    Patient discussed with Dr. Huff.    Catherine Johnson, DNP, APRN, CNP  Inpatient Nurse Practitioner  Pulmonary CF/Transplant     Subjective & Interval History:     Remains on RA during the day and with excellent adherence to overnight NIPPV, though VBG very slightly worse again today.  No HA, fatigue today.  No new cough or sputum.  Continues to tolerate PO, occasional nausea, loose stools remain relatively stable.    Review of Systems:     C: No fever, no chills, no change in weight, no change in appetite  INTEGUMENTARY/SKIN: No rash or obvious new lesions  ENT/MOUTH: No sore throat, no sinus pain, no nasal congestion or drainage  RESP: See interval history  CV: No chest pain, no palpitations, no peripheral edema, no orthopnea  GI: See interval history  : Oliguria  MUSCULOSKELETAL: No myalgias, no arthralgias  ENDOCRINE: Blood sugars with adequate control  NEURO: No headache, no numbness or tingling  PSYCHIATRIC: Mood stable    Physical Exam:     All notes, images, and labs from past 24 hours (at minimum) were reviewed.    Vital signs:  Temp: 98.3  F (36.8  C) Temp src: Oral BP: (!) 144/62 Pulse: 80   Resp: 18 SpO2: 97 % O2 Device: BiPAP/CPAP Oxygen Delivery: 4 LPM   Weight: 41.3 kg (91 lb 1.6 oz)  I/O:   Intake/Output Summary (Last 24 hours) at 4/8/2024 0643  Last data filed at 4/7/2024 2000  Gross per 24 hour   Intake 1050 ml   Output --   Net 1050 ml     Constitutional: Lying in bed, thin appearing, in no apparent distress.   HEENT: Eyes with pink conjunctivae, anicteric.  Oral mucosa moist without lesions.  "  PULM: Non-labored breathing on RA.  CV: No peripheral edema.   ABD: Non-distended.  PEG/J tube site not visualized.  MSK: Moves all extremities.  + muscle wasting.   NEURO: Alert.   SKIN: Warm, dry, fragile, scattered ecchymosis.   PSYCH: Mood stable.     Data:     LABS    CMP:   Recent Labs   Lab 04/08/24 0438 04/07/24 0635 04/06/24 0432 04/05/24 0437    135 142 142   POTASSIUM 5.3 4.5 4.2 3.4   CHLORIDE 96* 95* 102 103   CO2 26 27 26 28   ANIONGAP 15 13 14 11   GLC 84 104* 114* 108*   BUN 80.3* 46.3* 60.0* 30.0*   CR 4.99* 3.14* 4.78* 2.87*   GFRESTIMATED 9* 16* 10* 18*   ESTUARDO 9.6 9.5 9.6 9.3   MAG 2.2 1.8 2.0 1.9   PHOS 6.1* 3.9 4.8* 3.3   PROTTOTAL 6.1* 6.3* 5.7* 6.1*   ALBUMIN 3.8 3.8 3.6 3.8   BILITOTAL 0.2 0.2 0.2 0.2   ALKPHOS 106 115 110 103   AST 13 16 14 15   ALT 8 10 12 12     CBC:   Recent Labs   Lab 04/08/24 0438 04/06/24 0728 04/06/24 0432 04/05/24 0437   WBC 8.1 7.6 7.8 6.5   RBC 3.05* 2.74* 2.79* 3.02*   HGB 10.6* 9.5* 9.8* 10.5*   HCT 34.4* 30.7* 31.6* 33.6*   * 112* 113* 111*   MCH 34.8* 34.7* 35.1* 34.8*   MCHC 30.8* 30.9* 31.0* 31.3*   RDW 17.8* 17.4* 17.2* 16.7*    321 308 289       INR:   Recent Labs   Lab 04/08/24 0438 04/07/24 0635 04/06/24 0432 04/05/24 0437   INR 1.04 1.10 1.11 1.10       Glucose:   Recent Labs   Lab 04/08/24  0438 04/07/24  0635 04/06/24  0432 04/05/24  0437 04/04/24  0320 04/03/24  0351   GLC 84 104* 114* 108* 100* 119*       Blood Gas:   Recent Labs   Lab 04/08/24  0438 04/07/24  0636 04/06/24  0432   PHV 7.21* 7.23* 7.23*   PCO2V 75* 74* 72*   PO2V 27 27 42   HCO3V 30* 31* 30*   LINDY 0.7 1.4 1.4   O2PER 30 30 30       Culture Data No results for input(s): \"CULT\" in the last 168 hours.    Virology Data:   Lab Results   Component Value Date    FLUAH1 Not Detected 03/24/2024    FLUAH3 Not Detected 03/24/2024    VQ6527 Not Detected 03/24/2024    IFLUB Not Detected 03/24/2024    RSVA Not Detected 03/24/2024    RSVB Not Detected 03/24/2024    " PIV1 Not Detected 03/24/2024    PIV2 Not Detected 03/24/2024    PIV3 Not Detected 03/24/2024    HMPV Not Detected 03/24/2024       Historical CMV results (last 3 of prior testing):  Lab Results   Component Value Date    CMVQNT Not Detected 03/18/2024    CMVQNT Not Detected 02/19/2024    CMVQNT Not Detected 02/07/2024     Lab Results   Component Value Date    CMVLOG 3.2 07/12/2023    CMVLOG <2.1 04/19/2023    CMVLOG 3.5 01/25/2023       Urine Studies    Recent Labs   Lab Test 02/18/24  0222 05/18/23  0627   URINEPH 7.5* 5.0   NITRITE Negative Negative   LEUKEST Trace* Moderate*   WBCU 66* 21*       Most Recent [place] Pulmonary Function Testing (FVC/FEV1 only)  FVC-Pre   Date Value Ref Range Status   02/07/2024 1.19 L    01/10/2024 1.12 L    08/29/2023 1.48 L    07/25/2023 1.55 L      FVC-%Pred-Pre   Date Value Ref Range Status   02/07/2024 42 %    01/10/2024 39 %    08/29/2023 53 %    07/25/2023 55 %      FEV1-Pre   Date Value Ref Range Status   02/07/2024 1.13 L    01/10/2024 1.10 L    08/29/2023 1.43 L    07/25/2023 1.54 L      FEV1-%Pred-Pre   Date Value Ref Range Status   02/07/2024 51 %    01/10/2024 49 %    08/29/2023 64 %    07/25/2023 69 %        IMAGING    No results found for this or any previous visit (from the past 48 hour(s)).

## 2024-04-08 NOTE — IR NOTE
Patient Name: Sofie Rodriguez  Medical Record Number: [id number]  Today's Date: 4/8/2024    Procedure: GJ replacement   Proceduralist: Bo Capone PA-C    Procedure Start: 1028  Procedure end: 1040  Sedation medications administered: local     Report given to: 4C, RN    Other Notes: Pt arrived to IR room 4 from . Consent reviewed. Pt denies any questions or concerns regarding procedure. Pt positioned supine and monitored per protocol. Pt tolerated procedure without any noted complications. Pt transferred back to .

## 2024-04-09 LAB
ALBUMIN SERPL BCG-MCNC: 3.5 G/DL (ref 3.5–5.2)
ALP SERPL-CCNC: 118 U/L (ref 40–150)
ALT SERPL W P-5'-P-CCNC: 10 U/L (ref 0–50)
ANION GAP SERPL CALCULATED.3IONS-SCNC: 18 MMOL/L (ref 7–15)
AST SERPL W P-5'-P-CCNC: 16 U/L (ref 0–45)
BASE EXCESS BLDV CALC-SCNC: -0.6 MMOL/L (ref -3–3)
BASE EXCESS BLDV CALC-SCNC: 9.5 MMOL/L (ref -3–3)
BASOPHILS # BLD AUTO: ABNORMAL 10*3/UL
BASOPHILS # BLD MANUAL: 0.1 10E3/UL (ref 0–0.2)
BASOPHILS NFR BLD AUTO: ABNORMAL %
BASOPHILS NFR BLD MANUAL: 2 %
BILIRUB SERPL-MCNC: 0.2 MG/DL
BUN SERPL-MCNC: 100 MG/DL (ref 8–23)
CALCIUM SERPL-MCNC: 9.1 MG/DL (ref 8.8–10.2)
CHLORIDE SERPL-SCNC: 97 MMOL/L (ref 98–107)
CREAT SERPL-MCNC: 6.59 MG/DL (ref 0.51–0.95)
CYCLOSPORINE BLD LC/MS/MS-MCNC: 87 UG/L (ref 50–400)
DEPRECATED HCO3 PLAS-SCNC: 26 MMOL/L (ref 22–29)
EGFRCR SERPLBLD CKD-EPI 2021: 7 ML/MIN/1.73M2
EOSINOPHIL # BLD AUTO: ABNORMAL 10*3/UL
EOSINOPHIL # BLD MANUAL: 0.2 10E3/UL (ref 0–0.7)
EOSINOPHIL NFR BLD AUTO: ABNORMAL %
EOSINOPHIL NFR BLD MANUAL: 3 %
ERYTHROCYTE [DISTWIDTH] IN BLOOD BY AUTOMATED COUNT: 18.2 % (ref 10–15)
GLUCOSE SERPL-MCNC: 84 MG/DL (ref 70–99)
HCO3 BLDV-SCNC: 29 MMOL/L (ref 21–28)
HCO3 BLDV-SCNC: 37 MMOL/L (ref 21–28)
HCT VFR BLD AUTO: 31 % (ref 35–47)
HGB BLD-MCNC: 9.4 G/DL (ref 11.7–15.7)
IMM GRANULOCYTES # BLD: ABNORMAL 10*3/UL
IMM GRANULOCYTES NFR BLD: ABNORMAL %
INR PPP: 1.06 (ref 0.85–1.15)
LYMPHOCYTES # BLD AUTO: ABNORMAL 10*3/UL
LYMPHOCYTES # BLD MANUAL: 0.8 10E3/UL (ref 0.8–5.3)
LYMPHOCYTES NFR BLD AUTO: ABNORMAL %
LYMPHOCYTES NFR BLD MANUAL: 11 %
MAGNESIUM SERPL-MCNC: 2.3 MG/DL (ref 1.7–2.3)
MCH RBC QN AUTO: 34.8 PG (ref 26.5–33)
MCHC RBC AUTO-ENTMCNC: 30.3 G/DL (ref 31.5–36.5)
MCV RBC AUTO: 115 FL (ref 78–100)
MONOCYTES # BLD AUTO: ABNORMAL 10*3/UL
MONOCYTES # BLD MANUAL: 0.7 10E3/UL (ref 0–1.3)
MONOCYTES NFR BLD AUTO: ABNORMAL %
MONOCYTES NFR BLD MANUAL: 10 %
NEUTROPHILS # BLD AUTO: ABNORMAL 10*3/UL
NEUTROPHILS # BLD MANUAL: 5.1 10E3/UL (ref 1.6–8.3)
NEUTROPHILS NFR BLD AUTO: ABNORMAL %
NEUTROPHILS NFR BLD MANUAL: 74 %
NRBC # BLD AUTO: 0 10E3/UL
NRBC BLD AUTO-RTO: 0 /100
O2/TOTAL GAS SETTING VFR VENT: 0 %
O2/TOTAL GAS SETTING VFR VENT: 30 %
OXYHGB MFR BLDV: 30 % (ref 70–75)
OXYHGB MFR BLDV: 70 % (ref 70–75)
PCO2 BLDV: 63 MM HG (ref 40–50)
PCO2 BLDV: 77 MM HG (ref 40–50)
PH BLDV: 7.19 [PH] (ref 7.32–7.43)
PH BLDV: 7.37 [PH] (ref 7.32–7.43)
PHOSPHATE SERPL-MCNC: 6 MG/DL (ref 2.5–4.5)
PLAT MORPH BLD: ABNORMAL
PLATELET # BLD AUTO: 259 10E3/UL (ref 150–450)
PO2 BLDV: 25 MM HG (ref 25–47)
PO2 BLDV: 39 MM HG (ref 25–47)
POLYCHROMASIA BLD QL SMEAR: ABNORMAL
POTASSIUM SERPL-SCNC: 4.8 MMOL/L (ref 3.4–5.3)
PROT SERPL-MCNC: 5.8 G/DL (ref 6.4–8.3)
RBC # BLD AUTO: 2.7 10E6/UL (ref 3.8–5.2)
RBC MORPH BLD: ABNORMAL
SAO2 % BLDV: 30.1 % (ref 70–75)
SAO2 % BLDV: 71.9 % (ref 70–75)
SODIUM SERPL-SCNC: 141 MMOL/L (ref 135–145)
TME LAST DOSE: NORMAL H
TME LAST DOSE: NORMAL H
WBC # BLD AUTO: 6.9 10E3/UL (ref 4–11)

## 2024-04-09 PROCEDURE — 250N000013 HC RX MED GY IP 250 OP 250 PS 637

## 2024-04-09 PROCEDURE — 99233 SBSQ HOSP IP/OBS HIGH 50: CPT | Mod: GC | Performed by: STUDENT IN AN ORGANIZED HEALTH CARE EDUCATION/TRAINING PROGRAM

## 2024-04-09 PROCEDURE — 250N000012 HC RX MED GY IP 250 OP 636 PS 637: Performed by: NURSE PRACTITIONER

## 2024-04-09 PROCEDURE — 90935 HEMODIALYSIS ONE EVALUATION: CPT

## 2024-04-09 PROCEDURE — 99233 SBSQ HOSP IP/OBS HIGH 50: CPT | Performed by: PHYSICIAN ASSISTANT

## 2024-04-09 PROCEDURE — 82805 BLOOD GASES W/O2 SATURATION: CPT

## 2024-04-09 PROCEDURE — 85610 PROTHROMBIN TIME: CPT | Performed by: STUDENT IN AN ORGANIZED HEALTH CARE EDUCATION/TRAINING PROGRAM

## 2024-04-09 PROCEDURE — 250N000011 HC RX IP 250 OP 636: Performed by: HOSPITALIST

## 2024-04-09 PROCEDURE — 250N000013 HC RX MED GY IP 250 OP 250 PS 637: Performed by: INTERNAL MEDICINE

## 2024-04-09 PROCEDURE — 120N000003 HC R&B IMCU UMMC

## 2024-04-09 PROCEDURE — 84100 ASSAY OF PHOSPHORUS: CPT | Performed by: STUDENT IN AN ORGANIZED HEALTH CARE EDUCATION/TRAINING PROGRAM

## 2024-04-09 PROCEDURE — 80053 COMPREHEN METABOLIC PANEL: CPT

## 2024-04-09 PROCEDURE — 250N000009 HC RX 250: Performed by: STUDENT IN AN ORGANIZED HEALTH CARE EDUCATION/TRAINING PROGRAM

## 2024-04-09 PROCEDURE — 80158 DRUG ASSAY CYCLOSPORINE: CPT | Performed by: NURSE PRACTITIONER

## 2024-04-09 PROCEDURE — 85007 BL SMEAR W/DIFF WBC COUNT: CPT

## 2024-04-09 PROCEDURE — 83735 ASSAY OF MAGNESIUM: CPT | Performed by: STUDENT IN AN ORGANIZED HEALTH CARE EDUCATION/TRAINING PROGRAM

## 2024-04-09 PROCEDURE — 99233 SBSQ HOSP IP/OBS HIGH 50: CPT | Performed by: NURSE PRACTITIONER

## 2024-04-09 PROCEDURE — 85014 HEMATOCRIT: CPT

## 2024-04-09 PROCEDURE — 250N000013 HC RX MED GY IP 250 OP 250 PS 637: Performed by: STUDENT IN AN ORGANIZED HEALTH CARE EDUCATION/TRAINING PROGRAM

## 2024-04-09 PROCEDURE — 36415 COLL VENOUS BLD VENIPUNCTURE: CPT | Performed by: NURSE PRACTITIONER

## 2024-04-09 PROCEDURE — 36415 COLL VENOUS BLD VENIPUNCTURE: CPT

## 2024-04-09 PROCEDURE — 82805 BLOOD GASES W/O2 SATURATION: CPT | Performed by: NURSE PRACTITIONER

## 2024-04-09 PROCEDURE — 250N000012 HC RX MED GY IP 250 OP 636 PS 637

## 2024-04-09 PROCEDURE — 634N000001 HC RX 634: Mod: JZ | Performed by: HOSPITALIST

## 2024-04-09 PROCEDURE — 999N000157 HC STATISTIC RCP TIME EA 10 MIN

## 2024-04-09 PROCEDURE — 258N000003 HC RX IP 258 OP 636: Performed by: HOSPITALIST

## 2024-04-09 RX ORDER — AMINO AC/PROTEIN HYDR/WHEY PRO 10G-100/30
1 LIQUID (ML) ORAL DAILY
Qty: 90 PACKET | Refills: 3 | Status: SHIPPED | OUTPATIENT
Start: 2024-04-09 | End: 2024-06-26

## 2024-04-09 RX ADMIN — MIDODRINE HYDROCHLORIDE 10 MG: 5 TABLET ORAL at 06:48

## 2024-04-09 RX ADMIN — Medication 2.5 MCG: at 19:51

## 2024-04-09 RX ADMIN — EPOETIN ALFA-EPBX 4000 UNITS: 10000 INJECTION, SOLUTION INTRAVENOUS; SUBCUTANEOUS at 09:54

## 2024-04-09 RX ADMIN — METOPROLOL TARTRATE 25 MG: 25 TABLET, FILM COATED ORAL at 19:50

## 2024-04-09 RX ADMIN — Medication 40 MG: at 19:49

## 2024-04-09 RX ADMIN — AMIODARONE HYDROCHLORIDE 200 MG: 200 TABLET ORAL at 07:43

## 2024-04-09 RX ADMIN — Medication 2.5 MCG: at 07:43

## 2024-04-09 RX ADMIN — LEVOTHYROXINE SODIUM 25 MCG: 0.03 TABLET ORAL at 09:08

## 2024-04-09 RX ADMIN — ACETAMINOPHEN 975 MG: 325 TABLET, FILM COATED ORAL at 16:08

## 2024-04-09 RX ADMIN — CALCIUM CARBONATE 600 MG (1,500 MG)-VITAMIN D3 400 UNIT TABLET 1 TABLET: at 12:48

## 2024-04-09 RX ADMIN — CALCIUM CARBONATE 600 MG (1,500 MG)-VITAMIN D3 400 UNIT TABLET 1 TABLET: at 07:42

## 2024-04-09 RX ADMIN — SULFAMETHOXAZOLE AND TRIMETHOPRIM 1 TABLET: 400; 80 TABLET ORAL at 19:57

## 2024-04-09 RX ADMIN — PREDNISONE 5 MG: 5 TABLET ORAL at 07:42

## 2024-04-09 RX ADMIN — CLONIDINE HYDROCHLORIDE 0.1 MG: 0.1 TABLET ORAL at 21:49

## 2024-04-09 RX ADMIN — Medication 40 MG: at 07:42

## 2024-04-09 RX ADMIN — CYCLOSPORINE 100 MG: 100 SOLUTION ORAL at 07:49

## 2024-04-09 RX ADMIN — SODIUM CHLORIDE 250 ML: 9 INJECTION, SOLUTION INTRAVENOUS at 08:43

## 2024-04-09 RX ADMIN — LOPERAMIDE HCL 4 MG: 1 SOLUTION ORAL at 07:49

## 2024-04-09 RX ADMIN — LIDOCAINE 4% 1 PATCH: 40 PATCH TOPICAL at 19:52

## 2024-04-09 RX ADMIN — LIDOCAINE: 40 CREAM TOPICAL at 06:48

## 2024-04-09 RX ADMIN — SEVELAMER CARBONATE 0.8 G: 800 POWDER, FOR SUSPENSION ORAL at 19:53

## 2024-04-09 RX ADMIN — APIXABAN 2.5 MG: 2.5 TABLET, FILM COATED ORAL at 19:49

## 2024-04-09 RX ADMIN — SODIUM CHLORIDE 300 ML: 9 INJECTION, SOLUTION INTRAVENOUS at 08:43

## 2024-04-09 RX ADMIN — LOPERAMIDE HCL 4 MG: 1 SOLUTION ORAL at 21:49

## 2024-04-09 RX ADMIN — IRON SUCROSE 50 MG: 20 INJECTION, SOLUTION INTRAVENOUS at 11:11

## 2024-04-09 RX ADMIN — OLANZAPINE 5 MG: 5 TABLET, ORALLY DISINTEGRATING ORAL at 21:49

## 2024-04-09 RX ADMIN — SEVELAMER CARBONATE 0.8 G: 800 POWDER, FOR SUSPENSION ORAL at 07:48

## 2024-04-09 RX ADMIN — APIXABAN 2.5 MG: 2.5 TABLET, FILM COATED ORAL at 07:43

## 2024-04-09 RX ADMIN — CALCIUM CARBONATE 600 MG (1,500 MG)-VITAMIN D3 400 UNIT TABLET 1 TABLET: at 19:09

## 2024-04-09 RX ADMIN — CYCLOSPORINE 125 MG: 25 CAPSULE, LIQUID FILLED ORAL at 19:09

## 2024-04-09 ASSESSMENT — ACTIVITIES OF DAILY LIVING (ADL)
ADLS_ACUITY_SCORE: 30
ADLS_ACUITY_SCORE: 30
ADLS_ACUITY_SCORE: 33
ADLS_ACUITY_SCORE: 30
ADLS_ACUITY_SCORE: 33
ADLS_ACUITY_SCORE: 30
ADLS_ACUITY_SCORE: 33
ADLS_ACUITY_SCORE: 30
ADLS_ACUITY_SCORE: 33
ADLS_ACUITY_SCORE: 30
ADLS_ACUITY_SCORE: 30
ADLS_ACUITY_SCORE: 33
ADLS_ACUITY_SCORE: 30
ADLS_ACUITY_SCORE: 30
ADLS_ACUITY_SCORE: 33
ADLS_ACUITY_SCORE: 33
ADLS_ACUITY_SCORE: 30
ADLS_ACUITY_SCORE: 33

## 2024-04-09 NOTE — PROGRESS NOTES
Hours of care: [id number]    Neuro: A&Ox4. Uses glasses.  Cardiac: Off tele. VSS. Afebrile.    Respiratory: Sating >95% on RA. BiPAP at night and when napping. LS clear, dim.  GI/: Anuric, on HD. No BM this shift.  Diet/appetite: Tolerating renal diet and tube feeds. Denies nausea.  Activity:  Independent, up ad magalie.  Pain: C/o L ear pain - team aware. PRN Tylenol given with some relief per pt.   Skin: No new deficits noted. Sacral mepi placed over blanchable redness.  LDA's: R PIV (SL), L AV HD fistula, PEG (cont TF @ 35 mL/hr with q4hr 30 mL FWF)    Plan: Continue with POC. Notify primary team with changes. Anticipate possible discharge to home tomorrow pending VBG results.     Pertinent shift events: Transferred to . Home BiPAP education provided at bedside per home RT rep.     2RN skin check completed by Gabrielle ANTHONY and Padilla TSE

## 2024-04-09 NOTE — PROGRESS NOTES
Nephrology Progress Note  04/09/2024         Assessment & Recommendations:   Sofie Rodriguez is a 63 year old year old female with ESKD, COPD s/p b/l lung transplant in 6/2022 with multiple complications, gastroparesis s/p GJ tube, GIB, chronic diarrhea, recurrent c-diff, FTT with inability to tolerate any tube feeds, R hip fx s/p ORIF 12/2023, admitted on 2/10 for initiation of TPN/lipids, transferred to ICU on 2/17 with worsening mental status and respiratory acidosis in spite of bipap, ultimately intubated on 2/18, being treated for PNA, also with volume overload in setting of high volume TPN. Persistent respiratory acidosis in spite of volume off, transferred to ICU for hypotension and respiratory failure, improved on bipap, now floor status again 3/1. Transferred to ICU 3/18 again with worsening hypercapnic respiratory failure. Transferred to floor 3/20, back to ICU 3/24    # ESKD - TTS, LUE AVG, 3hr, prior to admission EDW 45.5 kg, now much lower at 38.5 kg, [facility], Dr. Andres Fairbanks.  - dialyzed daily last week, now back to euvolemia and will continue HD per TTS schedule. Pt is agreeable to 3.5 hr runs if it means avoiding 4x/week dialysis  - Requires EMLA cream an hour before HD  - please avoid PICC which will compromise future dialysis accesses; a midline is much preferred for this patient    # Persistent respiratory acidosis:    - now tolerating bipap at night and when napping  - transplant pulm following  - re-intubated 3/24 for bronch/BAL (NGTD), status improving with less trach support. Extubated 4/2, CO2 continuing to rise in spite of bipap  - dialyzing on bicarb 38 today (concern that additional bicarb will worsen the respiratory acidosis; will likely improve pH, will monitor CO2)    # Aflutter: on amio and BB  # Volume/BP: Anuric; on metoprolol soln 25 mg bid   - new EDW 38.5 kg  - pre HD standing wt 42 kg, 3 L UF today        # Nutrition: on [employer] tube feeds and regular diet    #  "Anemia 2/2 ESKD  On Venofer 50 qwk, Mircera last dose 1/9/2024  - hgb 9-10's  - iron labs 3/31: ferritin 1007, iron 34, IS 26 (at goal)  - Will continue Venofer 50 mcg q week (Tuesday)  - will reduce epogen from 8000 to 6000 units via HD    # BMD:   - Ca 8-9's, phos 6.0, alb 3.5  - on sevelamer 0.8g pck bid          Recommendations were communicated to primary team via note and in person    RABIA Moctezuma   Division of Renal Disease and Hypertension  [phone]    Interval History :   Seen on dialysis, 3L off. Worsening respiratory acidosis in spite of bipap. Dialyzing on bicarb 38 today. No n/v, CP, SOB, chills    Review of Systems:   4 point ROS neg other than as noted above    Physical Exam:   I/O last 3 completed shifts:  In: 1025 [P.O.:120; NG/GT:310]  Out: -    BP 93/77 (BP Location: Right arm, Cuff Size: Adult Small)   Pulse 77   Temp 98.2  F (36.8  C) (Oral)   Resp 12   Ht 1.57 m (5' 1.81\")   Wt 41.3 kg (91 lb 1.6 oz)   SpO2 97%   BMI 16.76 kg/m       GENERAL APPEARANCE: NAD  PULM: CTA  CV: RRR    Edema: none  GI: soft   INTEGUMENT: no cyanosis, no rashes on exposed skin  NEURO: a/o3  Access Left AVG     Labs:   All labs reviewed by me  Electrolytes/Renal -   Recent Labs   Lab Test 04/09/24  0710 04/08/24  0438 04/07/24  0635    137 135   POTASSIUM 4.8 5.3 4.5   CHLORIDE 97* 96* 95*   CO2 26 26 27   .0* 80.3* 46.3*   CR 6.59* 4.99* 3.14*   GLC 84 84 104*   ESTUARDO 9.1 9.6 9.5   MAG 2.3 2.2 1.8   PHOS 6.0* 6.1* 3.9       CBC -   Recent Labs   Lab Test 04/09/24  0710 04/08/24  0438 04/06/24  0728   WBC 6.9 8.1 7.6   HGB 9.4* 10.6* 9.5*    292 321       LFTs -   Recent Labs   Lab Test 04/09/24  0710 04/08/24  0438 04/07/24  0635   ALKPHOS 118 106 115   BILITOTAL 0.2 0.2 0.2   ALT 10 8 10   AST 16 13 16   PROTTOTAL 5.8* 6.1* 6.3*   ALBUMIN 3.5 3.8 3.8       Iron Panel -   Recent Labs   Lab Test 03/31/24  0456 09/26/22  0555 09/03/22  1039   IRON 34* 54 21*   IRONSAT 26 22 9*   CARLOS " 1,007* 769* 343*         Imaging:  All imaging studies reviewed by me.     Current Medications:  Current Facility-Administered Medications   Medication Dose Route Frequency Provider Last Rate Last Admin    amiodarone (PACERONE) tablet 200 mg  200 mg Oral or Feeding Tube Daily Sara Jorge APRN CNP   200 mg at 04/09/24 0743    apixaban ANTICOAGULANT (ELIQUIS) tablet 2.5 mg  2.5 mg Oral BID Beni Mccullough MD   2.5 mg at 04/09/24 0743    calcium carbonate-vitamin D (CALTRATE) 600-10 MG-MCG per tablet 1 tablet  1 tablet Per J Tube TID w/meals Maribell Cloud MD   1 tablet at 04/09/24 1248    cloNIDine (CATAPRES) tablet 0.1 mg  0.1 mg Oral At Bedtime Virginia Fowler MD   0.1 mg at 04/08/24 2207    [Held by provider] cyanocobalamin (VITAMIN B-12) tablet 500 mcg  500 mcg Per Feeding Tube Daily Maribell Cloud MD   500 mcg at 03/22/24 0754    cycloSPORINE modified (GENERIC EQUIVALENT) microemulsion solution 100 mg  100 mg Per G Tube BID IS Sara Grayson, NP   100 mg at 04/09/24 0749    fiber modular (BANATROL TF) packet 1 packet  1 packet Per Feeding Tube Q12H Maribell Cloud MD   1 packet at 04/09/24 0752    fluticasone (FLONASE) 50 MCG/ACT spray 1 spray  1 spray Both Nostrils Daily Beni Mccullough MD        heparin lock flush 10 UNIT/ML injection 5-20 mL  5-20 mL Intracatheter Q24H Betty Diaz MD   5 mL at 04/02/24 1416    levothyroxine (SYNTHROID/LEVOTHROID) tablet 25 mcg  25 mcg Oral QAM AC Kalpana Merino APRN CNP   25 mcg at 04/09/24 0908    Lidocaine (LIDOCARE) 4 % Patch 1 patch  1 patch Transdermal Q24h Maribell Cloud MD   1 patch at 04/08/24 2003    liothyronine (CYTOMEL) half-tab 2.5 mcg  2.5 mcg Oral BID Kalpana Merino APRN CNP   2.5 mcg at 04/09/24 0743    metoprolol tartrate (LOPRESSOR) tablet 25 mg  25 mg Per J Tube BID Sara Jorge APRN CNP   25 mg at 04/08/24 2005    OLANZapine zydis (zyPREXA) ODT tab 5 mg  5 mg Oral At Bedtime Betty Diaz MD   5 mg at  04/08/24 2207    pantoprazole (PROTONIX) 2 mg/mL suspension 40 mg  40 mg Per J Tube BID Maribell Cloud MD   40 mg at 04/09/24 0742    predniSONE (DELTASONE) tablet 5 mg  5 mg Per J Tube QAM Maribell Cloud MD   5 mg at 04/09/24 0742    sevelamer carbonate (RENVELA) Packet 0.8 g  0.8 g Oral BID Claribel Franks MD   0.8 g at 04/09/24 0748    sodium chloride (PF) 0.9% PF flush 10 mL  10 mL Intracatheter Q8H Betty Diaz MD   10 mL at 04/08/24 2007    sodium chloride (PF) 0.9% PF flush 10-40 mL  10-40 mL Intracatheter Q8H Betty Diaz MD   10 mL at 04/08/24 2207    sulfamethoxazole-trimethoprim (BACTRIM) 400-80 MG per tablet 1 tablet  1 tablet Oral Once per day on Tuesday Thursday Saturday Claribel Franks MD   1 tablet at 04/06/24 2018     Current Facility-Administered Medications   Medication Dose Route Frequency Provider Last Rate Last Admin    dextrose 10% infusion   Intravenous Continuous PRN Miguel Angel Stone MD        Patient is already receiving anticoagulation with heparin, enoxaparin (LOVENOX), warfarin (COUMADIN)  or other anticoagulant medication   Does not apply Continuous PRN Ting Aceves MD        Patient may continue current oral medications   Does not apply Continuous PRN Nguyen Johnson, RABIA López

## 2024-04-09 NOTE — PLAN OF CARE
Transferred to: 6B at 1504 via wheelchair belongings sent w/pt.  Status at time of transfer: s/p iHD today 3L removed. On RA w/ stable vital signs A&Ox4 denies pain.Should be on Bipap at HS and when napping throughout the day.   Belongings: sent w/ pt  Dong removed? (if no, why?): N/a  Chart and medications: sent w/ pt  Family notified:  N/A  Goal Outcome Evaluation: Wear home BIPAP tonight and assess possibly home tomorrow.

## 2024-04-09 NOTE — PROGRESS NOTES
HEMODIALYSIS TREATMENT NOTE    Date: 3/14/2024  Time: 1217    Data:  Pre Wt: 41.9  kg standing scale  Desired Wt: - 3 kg  Post Wt: -3 kg  Ultrafiltration - Post Run Net Total Removed (mL): 3000 mL  Vascular Access Status: patent  Dialyzer Rinse: Light streaked  Total Blood Volume Processed: 88 Liters  Total Dialysis (Treatment) Time: 3.5  Hours  Access: AVG left arm  Needle size: 15 g x 2  Bleeding time: arterial site 10 mins and venous 10 mins     Heparin: none     Lab:  No     Assessment:  -A & O x 4, calm and cooperative  - AVG with present bruit and thrill  - able to make needs known      Interventions:  Ran with K2 Ca2.5 bath per protocol, K serum 5.3 mmol/L and Ca serum 9.6 mg/dL for 3.5 hrs , accessed with 15 g  X 2 needles, 450 BFR  achieved and maintained, Vital signs monitored every 15 min and PRN, remained asymptomatic, pt remained hemodynamically stable throughout hemodialysis.  Post rinsed back successfully, needles pulled with ease,  pressure dressing applied and secured with gauze once homeostasis achieved.    Meds given: Epo 4,000 units, IV, Iron sucrose 50 mg, IV    See HD flow sheet and MAR for details.    Handoff report given to JAYE Stanton and endorsed pt with stable condition.                Plan:    Per renal team    Vani Pacheco RN  Acute Dialysis RN  [facility]

## 2024-04-09 NOTE — PROGRESS NOTES
[facility]    Progress Note - Medicine Service, NILE TEAM 3       Date of Admission:  2/10/2024    Assessment & Plan   Sofie Rodriguez is a 63 year old female with PMH COPD s/p bilateral lung transplant 6/28/22 c/b hemidiaphragm palsy and recurrent pneumonias, gastroparesis and small bowel dysmotility complicated by severe malnutrition now s/p PEG/J, ESRD on M/W/F HD, recent R femoral fx s/p ORIF, chronic diarrhea, recurrent c-diff, failure to thrive with inability to tolerate any tube feeds, who was admitted to [facility] on 2/10/24 for concerns over malnutrition and TPN initiation via portacath. Course complicated by recurrent and progressive acute on chronic hypoxic and hypercarbic respiratory failure requiring multiple ICU admissions and intubation 2/18-2/19, 2/28-2/29, 3/18-3/20, for continuous BiPAP. Again with worsening respiratory acidosis and hypercarbia, concern for infection vs acute rejection, requiring transfer back to ICU 3/20/2024 for intubation and bronchoscopy. Weaned off ventilator to RA and BIPAP at night. Transferred to medicine on 4/4/2024.     Changes today:   -pCO2 continues to increase  -Increased bicarb bath HD run today  -Working with SW to organize home BIPAP  -Repeat VBG at night to monitor response to increase Bicarb bath    # Acute on chronic hypoxic and hypercarbic respiratory failure requiring intubation 2/17-2/19 3/25-4/2, improved  # Recurrent PNAs, concern for acute infection vs acute rejection  # S/p BSLT 6/8/22 for COPD  # R hemidiaphragm palsy   # Positive DSA   # Respiratory acidosis, Worsening   Hx bilateral lung transplant on 6/28/2022 for COPD.  Initially admitted on 2/10/2024 for initiation of TPN.  However she was admitted to the ICU on 2/17/2024 for hypoxic hypercarbic respiratory failure.  She was intubated on 2/18 - 2/19 due to pulmonary edema vs infection.  Was extubated onto BiPAP/AVAPS with improvement in  mentation however continued to have respiratory acidosis.She was transferred to medicine floor on 2/29.  Was transferred back to the ICU on 3/18 - 3/20 due to hypercarbia and severe respiratory acidosis that did not require intubation.  Patient again experience worsening respiratory acidosis and hypercarbia will concern for infection VS acute rejection that required transfer back to ICU on 3/20/2024 for intubation and bronch.  She was weaned off the ventilator to room air and BiPAP at night.  Transferred to medicine on 4//2024.  She has had imaging to evaluate neurologic causes of decreased respiratory drive, without any notable findings.  Mentation remains stable despite low pH and increasing pCO2.  Volume status has improved with hemodialysis.  Overall her pCO2 retention is thought to be multifactorial, with a component of overfeeding through TPN, infection, bicarb loss through GI sources, and intolerance due to claustrophobia.   - Daily VBG with adjustments to NIPPV   - Repeat VBG at 2000 @ 4/9/2024 to monitor response to increase Bicarb bath  - Transplant pulm following, appreciate recs  - Immunosuppression:   >Prednisone 5 mg every morning, 2.5 mg every afternoon  >Cyclosporine 100mg BID (Stopped tacrolimus 3/10)   >Overnight oximetry study suggestive of O2 vs CPAP need, as did require up to 2LPM overnight to prevent hypoxia  >Minimize O2 to preserve respiratory drive, given IVIG for DSA+   >D/c'd theophylline 3/3/24 due to difficulty attaining therapeutic levels (started by ICU), could consider Modafinil   - Airway clearance/nebs:  --> resume BID scheduled if secretions worsen/thicken  - Xopenex neb BID PRN  - Mucomyst stopped 4/4   - Volume removal with iHD   - Increased bicarb bath with dialysis on 4/9/2024  - NIPPV settings   - AVAPS at Tv 425, 12 minimum mmHg and EPAP 7  - BIPAP/AVAPS at hs and naps at all times. Ideally 7-8 hours overnight, limited by claustrophobia, medications as below  - Zyprexa 5mg at  bedtime   - Atarax 25 mg TID PRN for anxiety  - Clonidine 0.1 mg at bedtime  - High risk for reintubation given hx of hypercarbia   - Will need bipap machine for home prior to discharge   - Working with CM/SW     L Ear pain and muffled hearing, improving  Rhinorrhea, resolved  Patient notes 3 days of L ear pain and mild rhinorrhea without cough or worsening SOB over the last 2-3 days. No systemic signs of new infection. No discharge from L ear on exam. Moderate ear wax burden. No bleeding or erythema. Low concern for acute otitis media. Could be a viral URI vs discomfort from using BIPAP/AVAPs more consistently leading to pressure imbalance across her tympanic membrane.   - CTM for now  - Daily fluticasone nasal sprays started on 4/7/2024     # Goals of Care  - 3/15 Care conference on with Transplant Pulm, Pall Care, Medicine, daughters (Julia Nash) and Transplant SW. Overall medical updates provided and Qs answered. With declining respiratory acidosis on labs, discussed code status 3/18. Patient initially not desiring any escalation of her care to ICU/intubation with frustration re overall course. However, after discussing with her daughter on the phone she and family elected to remain full code and agreed to ICU admission and intubation if necessary.   - 3/29 Care conference: Laid out a few options for family and patient to consider with qs answered. Examples being we could attempt to extubate to BiPAP but plan should be established prior to extubation that if patient decompensates and requires reintubation then likely pursue trach versus more comfort approach. Other option is going straight for trach now. Patient and family (daughter Julia and son present on Ipad) considering the options and had a lot of questions about trach and what that would look like long-term, which was laid out for the family and patient.   - Pt made the decision this hospitalization, that if needed down the line she would be  acceptable of a tracheostomy     # Chronic osmotic diarrhea/SIBO s/p Rifaximin  # Recurrent C.Diff (3rd ep 3/12/24)    # Diarrhea, improving  Recurrent C.diff 3/12, Fidaxomicin x 10 days (3/13-3/22), with improvement in diarrhea. Transplant pulm requesting repeat colonoscopy w/ Bx after completion of c.diff treatment, to r/o toxicity or other reason that diarrhea is present.  C.diff and enteric panel on 4/4/2024 was unremarkable.  Patient initially agreeable to colonoscopy, however later noted that she would like to defer colonoscopy for concerns regarding her weight and reintubation for the procedure. Diarrhea improving with PRN imodium.   - GI consult for recurrent diarrhea in the MICU, appreciate recs   - C.diff and enteric panel negative  - Confirmed Osmotic diarrhea with stool studies--> Likely not infectious, continue adjusting TF and loperamide PRN  - Bowel regimen PRN  - Imodium PRN     # Severe malnutrition   # Failure to thrive  # Hypoalbuminemia  # Gastroparesis, small bowel dysmotility  # S/P PEG/J with intolerance of enteral nutrition  Patient with gastroparesis (presumed due to vagal injury) and small bowel dysmotility complicated by unintentional 40lb weight loss over the past year and now severe malnutrition. Previously intolerant to oral food intake due to nausea. Was initially admitted for portacath and TPN initiation since 2/13. Intermittently tolerating feeds without n/v from 2/20.  Per GI, no ongoing concerns for SIBO as of 2/23. As of 3/11 transplant pulmonology is worried that TPN is not a realistic plan to continue at home and transitioned back to TF (RD oncerned pt is not absorbing any oral intake). TPN discontinued 3/16. Transplant pulm also worried about mucosal toxicity.   - Metabolic cart 4/5/24 following 24 hours of calorie counts   - Nutrition consulted, appreciate assistance  - Continue TF at goal rate 35 ml/hr via PEG/J  - Speech saw pt 4/3 okay for regular diet with thin liquids;  will continue pt tube feeds via pts g/j tube (hx of gastroparesis requiring TPN) until pt able to take in adequate PO nutrition          - PEGJ dislodged on 2024 and replaced with IR on 2024    # Recurrent pneumonia, concern for acute infection vs rejection  # Recurrent C.Diff colitis (3rd ep 3/12/24)  # Hx EBV viremia   # Hypogammaglobulinemia  # Chronic immunosuppression 2/ lung transplant  Empirically treated for pneumonia  - , RVP and cultures no growth to date. Recurrent C.Diff Positive 3/12, s/p PO Fidaxomicin 200 mg BID for 10 days (3/13-3/22) with improvement in diarrhea. Remains afebrile, leukocytosis resolved.  Ig, s/p IVIG.  EBV 27K (decreased compared to prior).     - Follow up BAL and blood cultures from 3/24, NGTD     Antibiotics:  Zosyn ( - , 3/24 - )   Bactrim (MWF, PJP ppx, previously on Dapsone)  Vancomycin ( - , 3/24-3/25)  Micafungin (- , 3/24 x1)  Fidaxomicin (3/13-3/22)     Micro:   - BAL 3/24:   - Cell count w diff: PMN 75%, lymphocytes 5, monocytes 19, eos 1  - No organisms seen on Gram stain  - RVP, HSV, Histoplasma neg  - Fungal & bacterial cultures NGTD  - EBV, CMV, PJP, Aspergillus, Legionella, Mycoplasma, AFB in process  - Bcx 3/24 NGTD    Chronic issues    # A-flutter (recurrent on 3/17)  # A-fib, intermittent  # HTN  # HFpEF  Noted Afib/flutter intermittently throughout admission. Was started on amiodarone bolus with drip and transitioned to PO dosing.   TTE: LVEF 55-60%, global RV function normal, no significant valvular abnormalities. Briefly required levophed and midodrine for HD but otherwise stable off pressors.   - MAP goal > 65 mmHg   - Continue Metoprolol tartrate dose 25 mg BID (hold parameters if MAP<65 or hr less than 60, hold on dialysis days)  - Continue amiodarone 200 mg PO  - Midodrine with HD as needed  - Restarted Apixaban now that patient not wanting colonoscopy     # Hypothyroidism  Patient with new  (2/17/24) low T4 at 0.64 and TSH at 6.5, concerning for new hypothyroidism vs sick thyroid syndrome. TSH with appropriate response, more consistent with elevation in the setting of acute illness. ICU team on 2/28 recommended initiation of treatment for possible hypothyroid as this can improve respiratory muscle function in the setting of weakness and malnutrition. 3/7, 3/15, 3/20 repeat thyroid function WNL.  - Continue liothyronine + levothyroxine -- note that prolonged combination therapy is not optimal & regimen should be re-evaluated (likely stop liothyronine, up-titrate levothyroxine) in post-acute setting    # Hx of Anxiety   # Claustrophobia  Reports claustrophobia contributing to limited compliance with BiPAP. Has seen health psych on 3/20, recommended grounding exercise (5-4-3-2-1) for use on BiPAP.   - Zyprexa 5mg at bedtime   - Atarax 25 mg TID PRN for anxiety  - Clonidine 0.1 mg at bedtime    # ESRD on HD TTS   # Mild hyperphosphatemia  Patient is ESRD on T/Th/Sat HD as outpt, now approx M/W/F inpatient. HD tolerating until 3/23. Briefly required extra Monday runs, not since being off TPN. She would prefer longer sessions to more days.  - Midodrine 10mg BID PRN with dialysis days   - Discuss with nephrology if there are additional strategies to optimize bicarb   - Sevelamer per Nephrology   - CBC and CMP daily  - Strict I/Os  - Daily weight   - Renally adjust medications     #Acute on chronic anemia 2/2 ESRD  Hgb stable, with no acute signs of bleeding.   - Daily CBC  - Transfuse for Hgb < 7  - Continue Epogen with dialysis, held in setting of concern for acute infection   - Continue Venofer 50 mcg qweek with dialysis, held in setting of concern for acute infection    # Suspected CARLEE  # PTA nocturnal O2, 2L   - Sleep clinic eval at discharge for suspected CARLEE    # GERD  - PPI BID    # Hyperglycemia, stress induced  Has been euglycemic for the past few days. Not on insulin.  - Hypoglycemia protocol      Diet: Adult Formula Drip Feeding: [employer] Stoll Renal Support; Jejunostomy; Goal Rate: 35 mL/hr (goal rate) held for 1 hour before/after Synthroid administration, per PharmD; mL/hr; Resume TF @ goal 35 ml/hr  Snacks/Supplements Adult: Other; Allow patient to order any supplement PRN if requested; Between Meals  Renal Diet (dialysis)    DVT Prophylaxis: DOAC  Dong Catheter: Not present  Fluids: None  Lines: None       Cardiac Monitoring: None  Code Status: Full Code      Clinically Significant Risk Factors              # Hypoalbuminemia: Lowest albumin = 2.6 g/dL at 2/18/2024  5:13 AM, will monitor as appropriate               # Severe Malnutrition: based on nutrition assessment    # Financial/Environmental Concerns: none         Disposition Plan   Pending medical stability and home BIPAP/AVAP coordination     The patient's care was discussed with the Attending Physician, Dr. Pantoja .    DELFIN LEDESMA MD  Medicine Service, [address]  Securely message with Vocera (more info)  Text page via [facility] Paging/Directory   See signed in provider for up to date coverage information  ______________________________________________________________________    Interval History   No acute events overnight.  Patient seen in the morning in dialysis unit.  Patient was concerned and anxious about her rising pCO2 numbers.  Patient notes that she tolerated AVAPS well overnight, she opted to continue AVAPS during dialysis because she plans to continue to take naps.  Discussed reasons why her pCO2 might still be continuing to increase.  No new concerns of chest pain, shortness of breath while off AVAPS, abdominal pain, or lower extremity edema.    Physical Exam   Vital Signs: Temp: 98.2  F (36.8  C) Temp src: Oral BP: 93/77 Pulse: 77   Resp: 12 SpO2: 97 % O2 Device: None (Room air)    Weight: 91 lbs 1.6 oz    General: Awake, alert & oriented. Frail appearing. Laying in  dialysis unit  HEENT: Mucous membranes moist  Neuro: Awake and alert, no focal deficits, moving all extremities to command and spontaneously  Pulm/Resp: Clear breath sounds bilaterally, diminished on R>L, no accessory muscle use  CV: RRR, S1/S2 without m/r/g; pedal pulses 2+ without LE edema  Abdomen: Soft, non-distended, non-tender, in the morning 18 Fr in place with dressings on top without leakage or drainage.  : Rectal tube in place, (+) bruit/thrill in LUE fistula  Incisions/Skin: PICC and PEG site without surrounding erythema, no rashes noted    Medical Decision Making       Please see A&P for additional details of medical decision making.      Data   ------------------------- PAST 24 HR DATA REVIEWED -----------------------------------------------    I have personally reviewed the following data over the past 24 hrs:    6.9  \   9.4 (L)   / 259     141 97 (L) 100.0 (H) /  84   4.8 26 6.59 (H) \     ALT: 10 AST: 16 AP: 118 TBILI: 0.2   ALB: 3.5 TOT PROTEIN: 5.8 (L) LIPASE: N/A     INR:  1.06 PTT:  N/A   D-dimer:  N/A Fibrinogen:  N/A       Imaging results reviewed over the past 24 hrs:   No results found for this or any previous visit (from the past 24 hour(s)).

## 2024-04-09 NOTE — PROGRESS NOTES
Care Management Follow Up    Length of Stay (days): 59    Expected Discharge Date: 04/09/2024     Concerns to be Addressed: discharge planning  Patient plan of care discussed at interdisciplinary rounds: Yes    Anticipated Discharge Disposition:  Dialysis Services     Anticipated Discharge Services: None  Anticipated Discharge DME: None    Patient/family educated on Medicare website which has current facility and service quality ratings:  yes  Education Provided on the Discharge Plan: Yes  Patient/Family in Agreement with the Plan: yes    Referrals Placed by CM/SW:  [facility]   Private pay costs discussed:     Additional Information:  Per communication with [facility], orders for TF have to be updated since patient is off TPN. Patient was on [employer] Formula at the hospital 4 cartons per day and tolerated it well.   In the past she was getting [employer] at home. It was covered at 100% by her insurance.  Requested Dr Beni Strauss, to enter FT orders with the length of treatment 90 days or more.     Faxed Atral and [employer] /[employer] form to [facility] ([phone]). They requested additional correction.  Sent Vocera page to Dr. Beni Strauss. Anticipating that Bipap will be brought to the hospital for 24 trial/ BG checks and adjustments.    Will need to determine when next HD will need to be scheduled in community.       Addendum 1043 am: Faxed corrected for back to [facility]  [phone] and left voice message for [facility] Hinojosa.     Addendum 1054 am: Spoke with [facility], Phone number: [phone]. They still keep her Chair time of 5 am - 8 am T/TH/Sat. Will confirm with the patient, once she is back from HD.     Addendum 1:23 pm: Per communication with [facility], bipap will be delivered between 2-3 pm. Per communication with [facility], requested in Credible message Dr. Bazan to remove TPN orders and place orders TF and supplies.    Patient stated that she has not had any HHC services prior to admission and will not need HHC.  She hopes to get outpatient therapy. No plans for insulin pump at home.   Patient also reported that she self scheduled her dialysis transportation for 4:15 am  and 8:30 am return home with [employer]. Once discharge date is confirmed, transportation will need to be arranged.     Faxed to [facility] ( [phone]) last nephrology and HD notes and updated that patient has been getting 3.5 hours of therapy per day. Left voice message for the clinic that patient's treatment time will likely need to be increased to 3.5 hrs and asked how to make this arrangements.     Addendum 3:26 pm Patient has been moved to . Left voice message for the unit RNCC to follow up for TF, HD and transportation.  Per RN shaneap was brought by [facility] v[facility] and he was referred to new patient's room 6240.     Prior to hospitalization services:  [facility]  Phone # [phone]  Fax # [phone]   Home Enteral     Dialysis:  [facility]:  Chair time of 11:45 AM T/TH/Sat  Phone number: [phone]  Fax: [phone]     [employer] - [phone] - for dialysis     Home [address] [employer] home oxygen:   Fax: [phone]   Phone: [phone] / [phone] ( Ashish)     Angela Johnson, MEHUL, MA, CCM, RN  4C/4A/4E ICU Care Coordinator  Phone:[phone]  Pager: [phone]    For Weekend & Holiday on call RN Care Coordinator:  [facility] [place] [facility] [place] ([phone]) Saturday & Sunday; ([phone]) [facility] Recognized Holidays   Weekend 4C/4A/4E ICUs /6A Care Coordinator  Pager: [phone]

## 2024-04-09 NOTE — PLAN OF CARE
Goal Outcome Evaluation:  .ICU End of Shift Summary. See flowsheets for vital signs and detailed assessment.    Changes this shift:   A&Ox4 and able to make needs known, IND to commode. BiPAP 30% when sleeping, RA during the day. TF remains at goal. Dialysis run this am, PRN midodrine and lidocaine cream given.     Plan:  At home BiPAP arrives today and transfer to floor when bed becomes available. Continue to follow plan of care and notify MD of any changes.     Problem: Adult Inpatient Plan of Care  Goal: Optimal Comfort and Wellbeing  Intervention: Provide Person-Centered Care  Recent Flowsheet Documentation  Taken 4/9/2024 0400 by Claribel Vizcaino RN  Trust Relationship/Rapport:   care explained   choices provided   emotional support provided   empathic listening provided   questions answered   questions encouraged   reassurance provided   thoughts/feelings acknowledged  Taken 4/8/2024 2000 by Claribel Vizcaino RN  Trust Relationship/Rapport:   care explained   choices provided   emotional support provided   empathic listening provided   questions answered   questions encouraged   reassurance provided   thoughts/feelings acknowledged     Problem: Diarrhea  Goal: Effective Diarrhea Management  Intervention: Manage Diarrhea  Recent Flowsheet Documentation  Taken 4/9/2024 0400 by Claribel Vizcaino RN  Fluid/Electrolyte Management: fluids provided  Isolation Precautions: enteric precautions maintained  Medication Review/Management: medications reviewed  Taken 4/8/2024 2000 by Claribel Vizcaino RN  Fluid/Electrolyte Management: fluids provided  Isolation Precautions: enteric precautions maintained  Medication Review/Management: medications reviewed         Plan of Care Reviewed With: patient    Overall Patient Progress: improvingOverall Patient Progress: improving    Outcome Evaluation: BiPAP for sleep

## 2024-04-10 LAB
ALBUMIN SERPL BCG-MCNC: 3.7 G/DL (ref 3.5–5.2)
ALP SERPL-CCNC: 113 U/L (ref 40–150)
ALT SERPL W P-5'-P-CCNC: 9 U/L (ref 0–50)
ANION GAP SERPL CALCULATED.3IONS-SCNC: 15 MMOL/L (ref 7–15)
AST SERPL W P-5'-P-CCNC: 18 U/L (ref 0–45)
BASE EXCESS BLDV CALC-SCNC: 10.1 MMOL/L (ref -3–3)
BASOPHILS # BLD AUTO: ABNORMAL 10*3/UL
BASOPHILS # BLD MANUAL: 0.1 10E3/UL (ref 0–0.2)
BASOPHILS NFR BLD AUTO: ABNORMAL %
BASOPHILS NFR BLD MANUAL: 1 %
BILIRUB SERPL-MCNC: 0.2 MG/DL
BUN SERPL-MCNC: 50.2 MG/DL (ref 8–23)
CALCIUM SERPL-MCNC: 8.9 MG/DL (ref 8.8–10.2)
CHLORIDE SERPL-SCNC: 90 MMOL/L (ref 98–107)
CREAT SERPL-MCNC: 3.76 MG/DL (ref 0.51–0.95)
DEPRECATED HCO3 PLAS-SCNC: 32 MMOL/L (ref 22–29)
EGFRCR SERPLBLD CKD-EPI 2021: 13 ML/MIN/1.73M2
EOSINOPHIL # BLD AUTO: ABNORMAL 10*3/UL
EOSINOPHIL # BLD MANUAL: 0.6 10E3/UL (ref 0–0.7)
EOSINOPHIL NFR BLD AUTO: ABNORMAL %
EOSINOPHIL NFR BLD MANUAL: 11 %
ERYTHROCYTE [DISTWIDTH] IN BLOOD BY AUTOMATED COUNT: 17.9 % (ref 10–15)
GLUCOSE SERPL-MCNC: 112 MG/DL (ref 70–99)
HCO3 BLDV-SCNC: 38 MMOL/L (ref 21–28)
HCT VFR BLD AUTO: 33.3 % (ref 35–47)
HGB BLD-MCNC: 10.3 G/DL (ref 11.7–15.7)
IMM GRANULOCYTES # BLD: ABNORMAL 10*3/UL
IMM GRANULOCYTES NFR BLD: ABNORMAL %
INR PPP: 1.14 (ref 0.85–1.15)
LYMPHOCYTES # BLD AUTO: ABNORMAL 10*3/UL
LYMPHOCYTES # BLD MANUAL: 0.9 10E3/UL (ref 0.8–5.3)
LYMPHOCYTES NFR BLD AUTO: ABNORMAL %
LYMPHOCYTES NFR BLD MANUAL: 15 %
MAGNESIUM SERPL-MCNC: 1.8 MG/DL (ref 1.7–2.3)
MCH RBC QN AUTO: 34.3 PG (ref 26.5–33)
MCHC RBC AUTO-ENTMCNC: 30.9 G/DL (ref 31.5–36.5)
MCV RBC AUTO: 111 FL (ref 78–100)
METAMYELOCYTES # BLD MANUAL: 0.1 10E3/UL
METAMYELOCYTES NFR BLD MANUAL: 1 %
MONOCYTES # BLD AUTO: ABNORMAL 10*3/UL
MONOCYTES # BLD MANUAL: 0.2 10E3/UL (ref 0–1.3)
MONOCYTES NFR BLD AUTO: ABNORMAL %
MONOCYTES NFR BLD MANUAL: 4 %
NEUTROPHILS # BLD AUTO: ABNORMAL 10*3/UL
NEUTROPHILS # BLD MANUAL: 3.9 10E3/UL (ref 1.6–8.3)
NEUTROPHILS NFR BLD AUTO: ABNORMAL %
NEUTROPHILS NFR BLD MANUAL: 68 %
NRBC # BLD AUTO: 0 10E3/UL
NRBC BLD AUTO-RTO: 0 /100
O2/TOTAL GAS SETTING VFR VENT: 21 %
OXYHGB MFR BLDV: 54 % (ref 70–75)
PCO2 BLDV: 67 MM HG (ref 40–50)
PH BLDV: 7.36 [PH] (ref 7.32–7.43)
PHOSPHATE SERPL-MCNC: 4.6 MG/DL (ref 2.5–4.5)
PLAT MORPH BLD: ABNORMAL
PLATELET # BLD AUTO: 235 10E3/UL (ref 150–450)
PO2 BLDV: 33 MM HG (ref 25–47)
POLYCHROMASIA BLD QL SMEAR: SLIGHT
POTASSIUM SERPL-SCNC: 3.8 MMOL/L (ref 3.4–5.3)
PROT SERPL-MCNC: 6 G/DL (ref 6.4–8.3)
RBC # BLD AUTO: 3 10E6/UL (ref 3.8–5.2)
RBC MORPH BLD: ABNORMAL
SAO2 % BLDV: 55.6 % (ref 70–75)
SODIUM SERPL-SCNC: 137 MMOL/L (ref 135–145)
T4 FREE SERPL-MCNC: 1.17 NG/DL (ref 0.9–1.7)
TSH SERPL DL<=0.005 MIU/L-ACNC: 6.07 UIU/ML (ref 0.3–4.2)
WBC # BLD AUTO: 5.7 10E3/UL (ref 4–11)

## 2024-04-10 PROCEDURE — 99232 SBSQ HOSP IP/OBS MODERATE 35: CPT | Mod: GC | Performed by: STUDENT IN AN ORGANIZED HEALTH CARE EDUCATION/TRAINING PROGRAM

## 2024-04-10 PROCEDURE — 84100 ASSAY OF PHOSPHORUS: CPT | Performed by: STUDENT IN AN ORGANIZED HEALTH CARE EDUCATION/TRAINING PROGRAM

## 2024-04-10 PROCEDURE — 250N000013 HC RX MED GY IP 250 OP 250 PS 637: Performed by: STUDENT IN AN ORGANIZED HEALTH CARE EDUCATION/TRAINING PROGRAM

## 2024-04-10 PROCEDURE — 36415 COLL VENOUS BLD VENIPUNCTURE: CPT

## 2024-04-10 PROCEDURE — 85027 COMPLETE CBC AUTOMATED: CPT

## 2024-04-10 PROCEDURE — 120N000003 HC R&B IMCU UMMC

## 2024-04-10 PROCEDURE — 82040 ASSAY OF SERUM ALBUMIN: CPT

## 2024-04-10 PROCEDURE — 250N000012 HC RX MED GY IP 250 OP 636 PS 637: Performed by: NURSE PRACTITIONER

## 2024-04-10 PROCEDURE — 250N000013 HC RX MED GY IP 250 OP 250 PS 637

## 2024-04-10 PROCEDURE — 250N000012 HC RX MED GY IP 250 OP 636 PS 637

## 2024-04-10 PROCEDURE — 82805 BLOOD GASES W/O2 SATURATION: CPT

## 2024-04-10 PROCEDURE — 85610 PROTHROMBIN TIME: CPT | Performed by: STUDENT IN AN ORGANIZED HEALTH CARE EDUCATION/TRAINING PROGRAM

## 2024-04-10 PROCEDURE — 84439 ASSAY OF FREE THYROXINE: CPT | Performed by: STUDENT IN AN ORGANIZED HEALTH CARE EDUCATION/TRAINING PROGRAM

## 2024-04-10 PROCEDURE — 999N000157 HC STATISTIC RCP TIME EA 10 MIN

## 2024-04-10 PROCEDURE — 83735 ASSAY OF MAGNESIUM: CPT | Performed by: STUDENT IN AN ORGANIZED HEALTH CARE EDUCATION/TRAINING PROGRAM

## 2024-04-10 PROCEDURE — 94060 EVALUATION OF WHEEZING: CPT

## 2024-04-10 PROCEDURE — 85007 BL SMEAR W/DIFF WBC COUNT: CPT

## 2024-04-10 PROCEDURE — 99233 SBSQ HOSP IP/OBS HIGH 50: CPT | Performed by: NURSE PRACTITIONER

## 2024-04-10 PROCEDURE — 84443 ASSAY THYROID STIM HORMONE: CPT | Performed by: STUDENT IN AN ORGANIZED HEALTH CARE EDUCATION/TRAINING PROGRAM

## 2024-04-10 RX ORDER — LEVOTHYROXINE SODIUM 25 UG/1
25 TABLET ORAL DAILY
Status: DISCONTINUED | OUTPATIENT
Start: 2024-04-11 | End: 2024-04-15 | Stop reason: HOSPADM

## 2024-04-10 RX ORDER — SEVELAMER CARBONATE FOR ORAL SUSPENSION 800 MG/1
0.8 POWDER, FOR SUSPENSION ORAL 2 TIMES DAILY WITH MEALS
Status: DISCONTINUED | OUTPATIENT
Start: 2024-04-10 | End: 2024-04-15 | Stop reason: HOSPADM

## 2024-04-10 RX ADMIN — LOPERAMIDE HCL 4 MG: 1 SOLUTION ORAL at 20:27

## 2024-04-10 RX ADMIN — CYCLOSPORINE 125 MG: 25 CAPSULE, LIQUID FILLED ORAL at 17:34

## 2024-04-10 RX ADMIN — PREDNISONE 5 MG: 5 TABLET ORAL at 08:33

## 2024-04-10 RX ADMIN — SEVELAMER CARBONATE 0.8 G: 800 POWDER, FOR SUSPENSION ORAL at 17:34

## 2024-04-10 RX ADMIN — LIDOCAINE 4% 1 PATCH: 40 PATCH TOPICAL at 20:28

## 2024-04-10 RX ADMIN — APIXABAN 2.5 MG: 2.5 TABLET, FILM COATED ORAL at 08:32

## 2024-04-10 RX ADMIN — Medication 40 MG: at 08:31

## 2024-04-10 RX ADMIN — OLANZAPINE 5 MG: 5 TABLET, ORALLY DISINTEGRATING ORAL at 22:34

## 2024-04-10 RX ADMIN — CALCIUM CARBONATE 600 MG (1,500 MG)-VITAMIN D3 400 UNIT TABLET 1 TABLET: at 17:34

## 2024-04-10 RX ADMIN — AMIODARONE HYDROCHLORIDE 200 MG: 200 TABLET ORAL at 08:32

## 2024-04-10 RX ADMIN — LEVOTHYROXINE SODIUM 25 MCG: 0.03 TABLET ORAL at 07:27

## 2024-04-10 RX ADMIN — CLONIDINE HYDROCHLORIDE 0.1 MG: 0.1 TABLET ORAL at 22:34

## 2024-04-10 RX ADMIN — METOPROLOL TARTRATE 25 MG: 25 TABLET, FILM COATED ORAL at 20:29

## 2024-04-10 RX ADMIN — APIXABAN 2.5 MG: 2.5 TABLET, FILM COATED ORAL at 20:28

## 2024-04-10 RX ADMIN — Medication 2.5 MCG: at 08:32

## 2024-04-10 RX ADMIN — CALCIUM CARBONATE 600 MG (1,500 MG)-VITAMIN D3 400 UNIT TABLET 1 TABLET: at 08:33

## 2024-04-10 RX ADMIN — METOPROLOL TARTRATE 25 MG: 25 TABLET, FILM COATED ORAL at 08:33

## 2024-04-10 RX ADMIN — CYCLOSPORINE 125 MG: 25 CAPSULE, LIQUID FILLED ORAL at 08:43

## 2024-04-10 RX ADMIN — Medication 40 MG: at 20:27

## 2024-04-10 RX ADMIN — Medication 2.5 MCG: at 20:29

## 2024-04-10 RX ADMIN — SEVELAMER CARBONATE 0.8 G: 800 POWDER, FOR SUSPENSION ORAL at 08:33

## 2024-04-10 RX ADMIN — CALCIUM CARBONATE 600 MG (1,500 MG)-VITAMIN D3 400 UNIT TABLET 1 TABLET: at 13:20

## 2024-04-10 ASSESSMENT — ACTIVITIES OF DAILY LIVING (ADL)
ADLS_ACUITY_SCORE: 35
ADLS_ACUITY_SCORE: 32
ADLS_ACUITY_SCORE: 33
ADLS_ACUITY_SCORE: 35
ADLS_ACUITY_SCORE: 32
ADLS_ACUITY_SCORE: 35
ADLS_ACUITY_SCORE: 35
ADLS_ACUITY_SCORE: 32
ADLS_ACUITY_SCORE: 35
ADLS_ACUITY_SCORE: 32
ADLS_ACUITY_SCORE: 32
ADLS_ACUITY_SCORE: 35
ADLS_ACUITY_SCORE: 35
ADLS_ACUITY_SCORE: 32
ADLS_ACUITY_SCORE: 32

## 2024-04-10 NOTE — PROGRESS NOTES
[facility]    Progress Note - Medicine Service, NILE TEAM 3       Date of Admission:  2/10/2024    Assessment & Plan   Sofie Rodriguez is a 63 year old female with PMH COPD s/p bilateral lung transplant 6/28/22 c/b hemidiaphragm palsy and recurrent pneumonias, gastroparesis and small bowel dysmotility complicated by severe malnutrition now s/p PEG/J, ESRD on M/W/F HD, recent R femoral fx s/p ORIF, chronic diarrhea, recurrent c-diff, failure to thrive with inability to tolerate any tube feeds, who was admitted to [facility] on 2/10/24 for concerns over malnutrition and TPN initiation via portacath. Course complicated by recurrent and progressive acute on chronic hypoxic and hypercarbic respiratory failure requiring multiple ICU admissions and intubation 2/18-2/19, 2/28-2/29, 3/18-3/20, for continuous BiPAP. Again with worsening respiratory acidosis and hypercarbia, concern for infection vs acute rejection, requiring transfer back to ICU 3/20/2024 for intubation and bronchoscopy. Weaned off ventilator to RA and BIPAP at night. Transferred to medicine on 4/4/2024.     Changes today:   -pCO2 improved with high Bicarb bath  -Home BIPAP delivered from home, patient had issues with leakage around mask overnight, troubleshooting continues  -Nutrition adjusting TF rate and length  -Will CTM on home BIPAP machine with daily VBGs to make sure hypercapnia is controlled    # Acute on chronic hypoxic and hypercarbic respiratory failure requiring intubation 2/17-2/19 3/25-4/2, improved  # Recurrent PNAs, concern for acute infection vs acute rejection  # S/p BSLT 6/8/22 for COPD  # R hemidiaphragm palsy   # Positive DSA   # Respiratory acidosis, improved with high bicarb bath with HD on 4/9/2024   Hx bilateral lung transplant on 6/28/2022 for COPD.  Initially admitted on 2/10/2024 for initiation of TPN.  However she was admitted to the ICU on 2/17/2024 for hypoxic  hypercarbic respiratory failure.  She was intubated on 2/18 - 2/19 due to pulmonary edema vs infection.  Was extubated onto BiPAP/AVAPS with improvement in mentation however continued to have respiratory acidosis.She was transferred to medicine floor on 2/29.  Was transferred back to the ICU on 3/18 - 3/20 due to hypercarbia and severe respiratory acidosis that did not require intubation.  Patient again experience worsening respiratory acidosis and hypercarbia will concern for infection VS acute rejection that required transfer back to ICU on 3/20/2024 for intubation and bronch.  She was weaned off the ventilator to room air and BiPAP at night.  Transferred to medicine on 4//2024.  She has had imaging to evaluate neurologic causes of decreased respiratory drive, without any notable findings.  Mentation remains stable despite low pH and increasing pCO2.  Volume status has improved with hemodialysis.  Overall her pCO2 retention is thought to be multifactorial, with a component of overfeeding through TPN, infection, bicarb loss through GI sources, and intolerance due to claustrophobia.   - Daily VBG with adjustments to NIPPV   - Transplant pulm following, appreciate recs  - Immunosuppression:   >Prednisone 5 mg every morning, 2.5 mg every afternoon  >Cyclosporine 100mg BID (Stopped tacrolimus 3/10)   >Overnight oximetry study suggestive of O2 vs CPAP need, as did require up to 2LPM overnight to prevent hypoxia  >Minimize O2 to preserve respiratory drive, given IVIG for DSA+   >D/c'd theophylline 3/3/24 due to difficulty attaining therapeutic levels (started by ICU), could consider Modafinil   - Airway clearance/nebs:  --> resume BID scheduled if secretions worsen/thicken  - Xopenex neb BID PRN  - Mucomyst stopped 4/4   - Volume removal with iHD   - Increased bicarb bath with dialysis on 4/9/2024  - NIPPV settings   - AVAPS at Tv 425, 12 minimum mmHg and EPAP 7  - BIPAP/AVAPS at hs and naps at all times. Ideally 7-8  hours overnight, limited by claustrophobia, medications as below  - Home BIPAP delivered from home, patient had issues with leakage around mask overnight, troubleshooting continues  - Zyprexa 5mg at bedtime   - Atarax 25 mg TID PRN for anxiety  - Clonidine 0.1 mg at bedtime  - High risk for reintubation given hx of hypercarbia     L Ear pain and muffled hearing, improving  Rhinorrhea, resolved  Patient notes 3 days of L ear pain and mild rhinorrhea without cough or worsening SOB over the last 2-3 days. No systemic signs of new infection. No discharge from L ear on exam. Moderate ear wax burden. No bleeding or erythema. Low concern for acute otitis media. Could be a viral URI vs discomfort from using BIPAP/AVAPs more consistently leading to pressure imbalance across her tympanic membrane.   - CTM for now  - Daily fluticasone nasal sprays started on 4/7/2024     # Goals of Care  - 3/15 Care conference on with Transplant Pulm, [facility], Medicine, daughters (Julia Nash) and Transplant SW. Overall medical updates provided and Qs answered. With declining respiratory acidosis on labs, discussed code status 3/18. Patient initially not desiring any escalation of her care to ICU/intubation with frustration re overall course. However, after discussing with her daughter on the phone she and family elected to remain full code and agreed to ICU admission and intubation if necessary.   - 3/29 Care conference: Laid out a few options for family and patient to consider with qs answered. Examples being we could attempt to extubate to BiPAP but plan should be established prior to extubation that if patient decompensates and requires reintubation then likely pursue trach versus more comfort approach. Other option is going straight for trach now. Patient and family (daughter Julia and son present on Ipad) considering the options and had a lot of questions about trach and what that would look like long-term, which was laid out for  the family and patient.   - Pt made the decision this hospitalization, that if needed down the line she would be acceptable of a tracheostomy     # Chronic osmotic diarrhea/SIBO s/p Rifaximin  # Recurrent C.Diff (3rd ep 3/12/24)    # Diarrhea, improving  Recurrent C.diff 3/12, Fidaxomicin x 10 days (3/13-3/22), with improvement in diarrhea. Transplant pulm requesting repeat colonoscopy w/ Bx after completion of c.diff treatment, to r/o toxicity or other reason that diarrhea is present.  C.diff and enteric panel on 4/4/2024 was unremarkable.  Patient initially agreeable to colonoscopy, however later noted that she would like to defer colonoscopy for concerns regarding her weight and reintubation for the procedure. Diarrhea improving with PRN imodium.   - GI consult for recurrent diarrhea in the MICU, appreciate recs   - C.diff and enteric panel negative  - Confirmed Osmotic diarrhea with stool studies--> Likely not infectious, continue adjusting TF and loperamide PRN  - Bowel regimen PRN  - Imodium PRN     # Severe malnutrition   # Failure to thrive  # Hypoalbuminemia  # Gastroparesis, small bowel dysmotility  # S/P PEG/J with intolerance of enteral nutrition  Patient with gastroparesis (presumed due to vagal injury) and small bowel dysmotility complicated by unintentional 40lb weight loss over the past year and now severe malnutrition. Previously intolerant to oral food intake due to nausea. Was initially admitted for portacath and TPN initiation since 2/13. Intermittently tolerating feeds without n/v from 2/20.  Per GI, no ongoing concerns for SIBO as of 2/23. As of 3/11 transplant pulmonology is worried that TPN is not a realistic plan to continue at home and transitioned back to TF (RD oncerned pt is not absorbing any oral intake). TPN discontinued 3/16. Transplant pulm also worried about mucosal toxicity.   - Metabolic cart 4/5/24 following 24 hours of calorie counts   - Nutrition consulted, appreciate  assistance  - Continue TF at goal rate 35 ml/hr via PEG/J  - Speech saw pt 4/3 okay for regular diet with thin liquids; will continue pt tube feeds via pts g/j tube (hx of gastroparesis requiring TPN) until pt able to take in adequate PO nutrition          - PEGJ dislodged on 2024 and replaced with IR on 2024    # Recurrent pneumonia, concern for acute infection vs rejection  # Recurrent C.Diff colitis (3rd ep 3/12/24)  # Hx EBV viremia   # Hypogammaglobulinemia  # Chronic immunosuppression / lung transplant  Empirically treated for pneumonia  - , RVP and cultures no growth to date. Recurrent C.Diff Positive 3/12, s/p PO Fidaxomicin 200 mg BID for 10 days (3/13-3/22) with improvement in diarrhea. Remains afebrile, leukocytosis resolved.  Ig, s/p IVIG.  EBV 27K (decreased compared to prior).     - Follow up BAL and blood cultures from 3/24, NGTD     Antibiotics:  Zosyn ( - , 3/24 - )   Bactrim (MWF, PJP ppx, previously on Dapsone)  Vancomycin ( - , 3/24-3/25)  Micafungin (- , 3/24 x1)  Fidaxomicin (3/13-3/22)     Micro:   - BAL 3/24:   - Cell count w diff: PMN 75%, lymphocytes 5, monocytes 19, eos 1  - No organisms seen on Gram stain  - RVP, HSV, Histoplasma neg  - Fungal & bacterial cultures NGTD  - EBV, CMV, PJP, Aspergillus, Legionella, Mycoplasma, AFB in process  - Bcx 3/24 NGTD    Chronic issues    # A-flutter (recurrent on 3/17)  # A-fib, intermittent  # HTN  # HFpEF  Noted Afib/flutter intermittently throughout admission. Was started on amiodarone bolus with drip and transitioned to PO dosing.   TTE: LVEF 55-60%, global RV function normal, no significant valvular abnormalities. Briefly required levophed and midodrine for HD but otherwise stable off pressors.   - MAP goal > 65 mmHg   - Continue Metoprolol tartrate dose 25 mg BID (hold parameters if MAP<65 or hr less than 60, hold on dialysis days)  - Continue amiodarone 200 mg PO  - Midodrine with  HD as needed  - Restarted Apixaban now that patient not wanting colonoscopy     # Hypothyroidism  Patient with new (2/17/24) low T4 at 0.64 and TSH at 6.5, concerning for new hypothyroidism vs sick thyroid syndrome. TSH with appropriate response, more consistent with elevation in the setting of acute illness. ICU team on 2/28 recommended initiation of treatment for possible hypothyroid as this can improve respiratory muscle function in the setting of weakness and malnutrition. 3/7, 3/15, 3/20 repeat thyroid function WNL.  - Continue liothyronine + levothyroxine -- note that prolonged combination therapy is not optimal & regimen should be re-evaluated (likely stop liothyronine, up-titrate levothyroxine) in post-acute setting    # Hx of Anxiety   # Claustrophobia  Reports claustrophobia contributing to limited compliance with BiPAP. Has seen health psych on 3/20, recommended grounding exercise (5-4-3-2-1) for use on BiPAP.   - Zyprexa 5mg at bedtime   - Atarax 25 mg TID PRN for anxiety  - Clonidine 0.1 mg at bedtime    # ESRD on HD TTS   # Mild hyperphosphatemia  Patient is ESRD on T/Th/Sat HD as outpt, now approx M/W/F inpatient. HD tolerating until 3/23. Briefly required extra Monday runs, not since being off TPN. She would prefer longer sessions to more days.  - Midodrine 10mg BID PRN with dialysis days   - Discuss with nephrology if there are additional strategies to optimize bicarb   - Sevelamer per Nephrology   - CBC and CMP daily  - Strict I/Os  - Daily weight   - Renally adjust medications     #Acute on chronic anemia 2/2 ESRD  Hgb stable, with no acute signs of bleeding.   - Daily CBC  - Transfuse for Hgb < 7  - Continue Epogen with dialysis, held in setting of concern for acute infection   - Continue Venofer 50 mcg qweek with dialysis, held in setting of concern for acute infection    # Suspected CARLEE  # PTA nocturnal O2, 2L   - Sleep clinic eval at discharge for suspected CARLEE    # GERD  - PPI BID    #  Hyperglycemia, stress induced  Has been euglycemic for the past few days. Not on insulin.  - Hypoglycemia protocol     Diet: Adult Formula Drip Feeding: Continuous Eneidasuajta Stoll Renal Support; Jejunostomy; Goal Rate: 35 mL/hr (goal rate) held for 1 hour before/after Synthroid administration, per PharmD; mL/hr; Resume TF @ goal 35 ml/hr  Snacks/Supplements Adult: Other; Allow patient to order any supplement PRN if requested; Between Meals  Renal Diet (dialysis)    DVT Prophylaxis: DOAC  Dong Catheter: Not present  Fluids: None  Lines: None       Cardiac Monitoring: None  Code Status: Full Code      Clinically Significant Risk Factors              # Hypoalbuminemia: Lowest albumin = 2.6 g/dL at 2/18/2024  5:13 AM, will monitor as appropriate               # Severe Malnutrition: based on nutrition assessment    # Financial/Environmental Concerns: none         Disposition Plan   Pending medical stability and home BIPAP/AVAP coordination     The patient's care was discussed with the Attending Physician, Dr. Pantoja .    DELFIN LEDESMA MD  Medicine Service, [facility] TEAM [address] [place] [address]  Securely message with Vocera (more info)  Text page via [facility] Paging/Directory   See signed in provider for up to date coverage information  ______________________________________________________________________    Interval History   Patient with improved pCO2 overnight. Seen in the AM and noting that she had issues with leakage around her BIPAP mask overnight with new home machine. She denies any new chest pains, SOB, or abdominal pains today. She denies any new fevers/chills.     Physical Exam   Vital Signs: Temp: 98.3  F (36.8  C) Temp src: Oral BP: 100/64 Pulse: 67   Resp: 17 SpO2: 100 % O2 Device: None (Room air)    Weight: 91 lbs 1.6 oz    General: Awake, alert & oriented. Frail appearing. Sitting in bed reading book  HEENT: Mucous membranes moist  Neuro: Awake and alert, no focal deficits, moving  all extremities to command and spontaneously  Pulm/Resp: Clear breath sounds bilaterally, diminished on R>L, no accessory muscle use  CV: RRR, S1/S2 without m/r/g; pedal pulses 2+ without LE edema  Abdomen: Soft, non-distended, non-tender, in the morning 18 Fr in place with dressings on top without leakage or drainage.  : Rectal tube in place, (+) bruit/thrill in LUE fistula  Incisions/Skin: PICC and PEG site without surrounding erythema, no rashes noted    Medical Decision Making       Please see A&P for additional details of medical decision making.      Data   ------------------------- PAST 24 HR DATA REVIEWED -----------------------------------------------    I have personally reviewed the following data over the past 24 hrs:    5.7  \   10.3 (L)   / 235     137 90 (L) 50.2 (H) /  112 (H)   3.8 32 (H) 3.76 (H) \     ALT: 9 AST: 18 AP: 113 TBILI: 0.2   ALB: 3.7 TOT PROTEIN: 6.0 (L) LIPASE: N/A     INR:  1.14 PTT:  N/A   D-dimer:  N/A Fibrinogen:  N/A       Imaging results reviewed over the past 24 hrs:   No results found for this or any previous visit (from the past 24 hour(s)).

## 2024-04-10 NOTE — PROGRESS NOTES
Patient YOB: 1960    Patient age: 63    Patient gender: F    Patient height: 157 cm          Predicted values:     FEV1: 2.33    FVC: 2.74    PEF: 6.0    FEV1/FVC: 85%    Patient values:    FEV1: 1.04    FVC: 1.13    PEF: 3.23    FEV1/FVC: 92%          Patient effort:  Good

## 2024-04-10 NOTE — PLAN OF CARE
Goal Outcome Evaluation:  Neuro: A&Ox4.   Cardiac: SR. VSS.   Respiratory: Sating 98% above on RA, Bipap/Cpap 12/7.   GI/: Anuric. BM X1, large. Imodium given  Diet/appetite: Tolerating renal diet. Eating sparingly, PEG G clamped, Jtube to TF at 35ml/hr 30 ml flushes every 4 hrs.   Activity:  Standby assist, independent to commode.   Pain: At acceptable level on current regimen. Left ear pain.   Skin: No new deficits noted.  LDA's: AV fistula at left arm, PIV at Right arm.     Plan: For possible discharge today. VBG in Am to check pco2. Continue with POC. Notify primary team with changes.

## 2024-04-10 NOTE — PLAN OF CARE
Neuro: A&Ox4.  Numbness and tingling noted in legs baseline.   Cardiac: SR. VSS.   Respiratory: Sating 95% on RA. Diminished lung sounds noted, encouraged IS. Bipap at night   GI/: anuric HD tomorrow.  No B/M noted this shift.   Diet/appetite: Tolerating Renal diet  TF infusing 40 ml/hr FWF 30 ml q4  Cycle TF started  at  1 pm to 7am.   Activity:  Independent in room. up to chair and in halls.  Pain: At acceptable level on current regimen.  No left ear pain noted   Skin: No new deficits noted.  LDA's: AV fistula Lt, PEG RT PIV     Plan: Continue with POC. Notify primary team with changes.

## 2024-04-10 NOTE — PROGRESS NOTES
CLINICAL NUTRITION SERVICES - BRIEF NOTE  For last full RD assessment, see note dated 4/4/24    NEW FINDINGS   - Received Vocera call with request from primary team to shorten patient's TF cycle as able. Historically, patient has been very sensitive to TF rate adjustments; shortest TF cycle for current admit is 22 hours/day. Will be cautious with rate changes/go slow with changes.     - Per PharmD, need to make sure cyclic feeds do not run for at least 1 hour before Synthroid dose. Pt additionally on Cytomel but PharmD ok if TFs not held for evening dose of that medication.     - Pt is open to starting with an 18-hour TF cycle as first step. Will plan to run feeds with Solum Renal Support at rate of 40 mL/hr from [id number]. Will monitor tolerance/ability to shorten cycle further in future.     INTERVENTIONS  Implementation  Collaboration with other providers - Primary team MD, bedside RN, PharmD   Enteral Nutrition - Increase TF rate w/ change to 18-hour cycle.     Monitoring/Evaluation  Will continue to monitor and evaluate per protocol.    Cortez Maxwell, MS, RDN, LD, CNSC  Available by Ryan or phone   [employer]oc[employer]: M-F (7:00-3:30)  6B Clinical Dietitian  or 2 Obs Clinical Dietitian  Weekend/Holiday (7:00-3:30) - Weekend Clinical Dietitian   6B RD desk: [phone]       **Clinical Dietitians are no longer available by pager.

## 2024-04-10 NOTE — PROGRESS NOTES
Pulmonary Medicine  Cystic Fibrosis - Lung Transplant Team  Progress Note  04/10/2024     Patient: Sofie Rodriguez  MRN: [id number]  : 1960 (age 63 year old)  Transplant: 2022 (Lung), POD#652  Admission date: 2/10/2024    Assessment & Plan:     Sofie Rodriguez is a 63 year old female with h/o COPD s/p BSLT () with course complicated by post-operative hemidiaphragm palsy, recurrent PNAs, positive DSA, EBV viremia, hypogammaglobulinemia, severe gastroparesis s/p G/J tube placement (22) and pyloric botox (23), GI bleed 2/2 pyloric ulcer, hemobilia s/p ERCP and MRCP, chronic diarrhea, recurrent C diff colitis, ESRD on iHD, recurrent falls with injuries (recent right hip fx s/p ORIF 2023), deconditioning, and FTT.  Admitted 2/10 for failure to thrive and initiation of TPN/lipids.  Emergent intubation - for hypoxic and hypercapneic respiratory failure and encephalopathy. Returned to ICU - and again 3/18-3/20 d/t tenuous respiratory status complicated by variable daytime NIPPV tolerance and hypervolemia with iHD limited d/t hypotension. Again transferred back to ICU 3/24 for intubation and bronch/BAL given hypoxic and hypercapneic respiratory failure. CT with extensive bilateral infiltrate and etiology likely multifactorial including volume overload and infection, possible mucous plugging, ACR or AMR less likely.  Extubated , no daytime hypoxia and hypercapnia remains stable with good adherence to NIPPV with sleep.  Tolerating PO, diarrhea more stable.  Progress acidosis and hypercapnia since 4/3 despite excellent adherence to overnight NIPPV and adjustments in settings, improved with bicarbonate bath adjustment per nephrology, still without daytime hypoxia.  Discharge home potentially early next week pending tolerance to home NIPPV and VBG monitoring following dialysis adjustments.       Today's recommendations:  - Continue daily morning VBG  - AVAPS (using home device  while inpatient) to continue with  ml, decrease Max inspiratory pressure to 25 to improve tolerance  - May need to revisit neuro assessment for factors contributing to progressive hypercapnic acidosis  - CSA repeat level ordered 4/12  - DSA, CMV, and EBV due 4/23  - Consider cycling TF, encourage TID supplemental shakes daily to meet PO nutritional goals     Acute on chronic hypoxic/hypercapneic respiratory failure:  S/p bilateral sequential lung transplant for COPD:   Hypoventilation, Suspected CARLEE:  PFTs in clinic remained significantly below her baseline.  Baseline hypoxia with 2L NC overnight PTA.  S/p intubation 2/17-2/19 for acute hypoxic/hypercapneic respiratory failure with encephalopathy, CT with concern for infection and pulmonary edema given recent addition of TPN nutrition.  Bronch (2/18) with very friable tissue, cutlures only with C. glabrata.  Persistent respiratory failure also complicated by hypoventilation and deconditioning d/t malnutrition. Neurology consulted 2/27, MRI brain (3/1) without acute intracranial pathology.  SNIFF (3/8) normal.  Metabolic CART suggested overfeeding on TPN.  Returned to ICU (3/18-3/20) d/t tenuous respiratory status complicated by NIPPV intolerance and hypervolemia with iHD limited d/t hypotension (CXR with increased pulmonary edema).  Overall, her PCO2 retention is likely multifactorial with a component being from overfeeding (though remedied with nutrition adjustment), metabolic compensation barrier d/t renal failure, pulmonary edema, bicarb loss with diarrhea, hypoventilation with declining NIF and FVC concerning for neuromuscular etiology (though Neurology consult was not revealing), and NIPPV intolerance due to claustrophobia. Worsening hypoxic and hypercapneic respiratory failure 3/24 and transferred to ICU for intubation. CT chest with extensive bilateral infiltrates markedly increased from previous.  Bronch with small L>R sided secretions easily  suctioned, BAL with no evidence of DAH, non bloody.  S/p Zosyn (3/23-4/2) for 10 day empiric course.  Extubated 4/2, hypercapnia stable with consistent overnight BiPAP use.  CXR (4/8) with stable bibasilar opacities and small left pleural effusion.  Progressive hypercapnia and acidosis despite transition from BiPAP to AVAPS (due to decreasing TV) and gradual increase in AVAPS TV, pt with excellent adherence to use overnight and with naps.  - Daily morning VBG (VBG 4/9 PM after iHD and bicarbonate bath adjustment per nephrology showed improved hypercapnia and AM 4/10 remains stable at 7.36/67)  - AVAPS (using home long-term device while inpatient since 4/9) to continue with  ml (ideal body weight for height up to 130# correlating with upper limit of TV at 8 ml/kg at 470 ml)  - May need to revisit neuro assessment for factors contributing to progressive hypercapnic acidosis     Immunosuppression:  AZA previously stopped 5/2023 d/t low ImmuKnow assay and EBV viremia.  ImmuKnow (2/27) remained low at 88.  ImmuKnow (4/1) further decreased to 43.  Transitioned from [place] to CSA 3/11.  -  mg BID (increased 4/9 and transitioned to PO).  Goal 120-140 (decreased 4/4 for ImmuKnow).  Repeat level 4/12 (ordered).  - Prednisone 5 mg daily     Prophylaxis:   - Bactrim qMWF for PJP ppx  - CMV ppx not currently indicated, D+/R+, CMV negative 3/18, repeat CMV monthly (due 4/23)     Positive DSA: AMR treatment deferred given frailty and stable PFTs.  DSA DQB2 decreased on 3/6 with 5667 mfi, and again decreased to 4960 on 3/23.  Most recent cell-free DNA (2/18) mildly elevated at 1.04 (concerning for possible rejection), which was increased from prior level of 0.12 (1/10).  IST increase deferred at that time per Dr. Gong.  S/p IVIG (2/27) for DSA (IgG WNL).  Immuknow as above.  Prospera (cell free DNA) 0.44 (3/18) suggests AMR less likely, follow  - Repeat DSA monthly (due 4/23)      EBV viremia: CT CAP (2/7) without  lymphadenopathy.  Recent levels improving (3/18) 23k.  - Repeat EBV due 4/23     Other relevant problems being managed by the primary team:      FTT:  Severe protein calorie malnutrition:  Gastroparesis s/p PEG/J, botox, and G-POEM:  SB hypomotility:  Pyloric ulcer:  Chronic nausea and osmotic diarrhea:  SIBO s/p rifaximin:   Recurrent C diff colitis: Chronic osmotic and infectious diarrhea since transplant with recurrent episodes of C diff.  Notable weight loss (40# in a year) d/t diarrhea, GI dysmotility, and intolerance of enteral feeds (PEG/J tube in place), most recently on elemental formula.  Extensive OP eval and f/u with GI. S/p port placement for TPN and lipids. Continued for ~5 weeks inpatient without considerable improvement, transitioned back to TF.  C diff positive (3/13), on fidaxomicin.  Repeat (4/4) negative, enteric B/V panel also negative.  Loose stools persist, GI consulted 4/4.  Colonoscopy recommended by GI this admission, but pt. deferred d/t risk for reintubation and improvement in diarrhea today.  - Tube feeds per RD, consider cycling TF  - Strongly encourage TID supplemental shakes daily to meet PO nutritional goals     ESRD: CT with volume overload secondary to TPN volume, iHD increased from PTA TThSa schedule with unsustained improvement.  Management per Nephrology, dialysis via [facility] CVC.  Unable to remove fluid on 3/23 d/t hypotension, tolerance now improved with midodrine on HD days.  Volume removal and dialysis per nephrology.     Goals of care: Care conference held with palliative and primary team on 3/15 for medical update with pt. and daughters.  Repeat care conference 3/29 (see palliative and MICU notes) patient would like to proceed with re-intubation and trach if pt. fails extubation d/t progressive hypercapnia, understanding that this would severely complicate potential disposition options.     We appreciate the excellent care provided by the [facility] team.   Recommendations communicated via Vocera and this note.  Will continue to follow along closely, please do not hesitate to call with any questions or concerns.    Patient discussed with Dr. Refugio Grayson, APRN, CNP   Inpatient Nurse Practitioner  Pulmonary CF/Transplant     Subjective & Interval History:     Remains on RA during the day and home AVAPS overnight.  Some reports of air leaking around mask despite tightening.  She is wondering if pressure can be decreased. No SOB or cough. Stools becoming thicker and less frequent.  Appetite remains fair.    Review of Systems:     C: No fever, no chills, no change in appetite  INTEGUMENTARY/SKIN: No rash or obvious new lesions  ENT/MOUTH: No sore throat, no sinus pain, no nasal congestion or drainage  RESP: See interval history  CV: No chest pain, no palpitations, no peripheral edema, no orthopnea  GI: No nausea, no vomiting, no reflux symptoms  : No dysuria  MUSCULOSKELETAL: No myalgias, no arthralgias  ENDOCRINE: Blood sugars with adequate control  NEURO: No headache, no numbness or tingling  PSYCHIATRIC: Mood stable    Physical Exam:     All notes, images, and labs from past 24 hours (at minimum) were reviewed.    Vital signs:  Temp: 98.3  F (36.8  C) Temp src: Oral BP: 100/64 Pulse: 67   Resp: 17 SpO2: 100 % O2 Device: None (Room air) Oxygen Delivery: 4 LPM   Weight: 41.3 kg (91 lb 1.6 oz)  I/O:   Intake/Output Summary (Last 24 hours) at 4/10/2024 1147  Last data filed at 4/10/2024 1100  Gross per 24 hour   Intake 1730 ml   Output 3000 ml   Net -1270 ml     Constitutional: sitting up in bed reading a book, in no apparent distress.   HEENT: Eyes with pink conjunctivae, anicteric.  Oral mucosa moist without lesions.   PULM: Diminished air flow bases bilaterally.  No crackles, no rhonchi, no wheezes.  Non-labored breathing on RA.  CV: Normal S1 and S2.  RRR.  + murmur. no gallop, or rub.  No peripheral edema.   ABD: NABS, soft, nontender, nondistended.   "PEG/J tube site cdi.   MSK: Moves all extremities.  +muscle wasting.   NEURO: Alert and conversant.   SKIN: Warm, dry, fragile, scattered ecchymosis.   PSYCH: Mood stable.     Data:     LABS    CMP:   Recent Labs   Lab 04/10/24  0621 04/09/24  0710 04/08/24  0438 04/07/24  0635    141 137 135   POTASSIUM 3.8 4.8 5.3 4.5   CHLORIDE 90* 97* 96* 95*   CO2 32* 26 26 27   ANIONGAP 15 18* 15 13   * 84 84 104*   BUN 50.2* 100.0* 80.3* 46.3*   CR 3.76* 6.59* 4.99* 3.14*   GFRESTIMATED 13* 7* 9* 16*   ESTUARDO 8.9 9.1 9.6 9.5   MAG 1.8 2.3 2.2 1.8   PHOS 4.6* 6.0* 6.1* 3.9   PROTTOTAL 6.0* 5.8* 6.1* 6.3*   ALBUMIN 3.7 3.5 3.8 3.8   BILITOTAL 0.2 0.2 0.2 0.2   ALKPHOS 113 118 106 115   AST 18 16 13 16   ALT 9 10 8 10     CBC:   Recent Labs   Lab 04/10/24  0621 04/09/24  0710 04/08/24  0438 04/06/24  0728   WBC 5.7 6.9 8.1 7.6   RBC 3.00* 2.70* 3.05* 2.74*   HGB 10.3* 9.4* 10.6* 9.5*   HCT 33.3* 31.0* 34.4* 30.7*   * 115* 113* 112*   MCH 34.3* 34.8* 34.8* 34.7*   MCHC 30.9* 30.3* 30.8* 30.9*   RDW 17.9* 18.2* 17.8* 17.4*    259 292 321       INR:   Recent Labs   Lab 04/10/24  0621 04/09/24  0710 04/08/24  0438 04/07/24  0635   INR 1.14 1.06 1.04 1.10       Glucose:   Recent Labs   Lab 04/10/24  0621 04/09/24  0710 04/08/24  0438 04/07/24  0635 04/06/24  0432 04/05/24  0437   * 84 84 104* 114* 108*       Blood Gas:   Recent Labs   Lab 04/10/24  0621 04/09/24 2032 04/09/24  0710   PHV 7.36 7.37 7.19*   PCO2V 67* 63* 77*   PO2V 33 39 25   HCO3V 38* 37* 29*   LINDY 10.1* 9.5* -0.6   O2PER 21 0 30       Culture Data No results for input(s): \"CULT\" in the last 168 hours.    Virology Data:   Lab Results   Component Value Date    FLUAH1 Not Detected 03/24/2024    FLUAH3 Not Detected 03/24/2024    YE6121 Not Detected 03/24/2024    IFLUB Not Detected 03/24/2024    RSVA Not Detected 03/24/2024    RSVB Not Detected 03/24/2024    PIV1 Not Detected 03/24/2024    PIV2 Not Detected 03/24/2024    PIV3 Not Detected " 03/24/2024    HMPV Not Detected 03/24/2024       Historical CMV results (last 3 of prior testing):  Lab Results   Component Value Date    CMVQNT Not Detected 03/18/2024    CMVQNT Not Detected 02/19/2024    CMVQNT Not Detected 02/07/2024     Lab Results   Component Value Date    CMVLOG 3.2 07/12/2023    CMVLOG <2.1 04/19/2023    CMVLOG 3.5 01/25/2023       Urine Studies    Recent Labs   Lab Test 02/18/24  0222 05/18/23  0627   URINEPH 7.5* 5.0   NITRITE Negative Negative   LEUKEST Trace* Moderate*   WBCU 66* 21*       Most Recent [place] Pulmonary Function Testing (FVC/FEV1 only)  FVC-Pre   Date Value Ref Range Status   02/07/2024 1.19 L    01/10/2024 1.12 L    08/29/2023 1.48 L    07/25/2023 1.55 L      FVC-%Pred-Pre   Date Value Ref Range Status   02/07/2024 42 %    01/10/2024 39 %    08/29/2023 53 %    07/25/2023 55 %      FEV1-Pre   Date Value Ref Range Status   02/07/2024 1.13 L    01/10/2024 1.10 L    08/29/2023 1.43 L    07/25/2023 1.54 L      FEV1-%Pred-Pre   Date Value Ref Range Status   02/07/2024 51 %    01/10/2024 49 %    08/29/2023 64 %    07/25/2023 69 %        IMAGING    No results found for this or any previous visit (from the past 48 hour(s)).

## 2024-04-10 NOTE — PLAN OF CARE
"Care from 1500 - 1930    Neuro: A&Ox4.  Numbness and tingling noted in legs baseline.   Cardiac: VSS.       Respiratory: Sating >95% on RA. LSC/D BiPAP at night   GI/: anuric HD T,TH, PIRES.  Last BM 4/9  Diet/appetite: Tolerating Renal diet. TF infusing 40 ml/hr FWF 30 ml q4  Cycle TF 1300 - 0700 daily  Activity:  Independent in room  Pain: At acceptable level on current regimen.  Denied pain this shift  Skin: No new deficits noted. PEG in p;lace - G clamped, J to TF  LDA's: L HD fistula, R PIV - SL    /61 (BP Location: Right arm, Cuff Size: Adult Small)   Pulse 82   Temp 98.6  F (37  C) (Oral)   Resp 20   Ht 1.57 m (5' 1.81\")   Wt 41.3 kg (91 lb 1.6 oz)   SpO2 96%   BMI 16.76 kg/m       Plan of Care: Continue to monitor Pt status and report changes to primary treatment team. Meds crushed through J except Cyclosporine.    "

## 2024-04-10 NOTE — PROGRESS NOTES
Care Management Follow Up    Length of Stay (days): 60    Expected Discharge Date: 04/10/2024     Concerns to be Addressed: discharge planning, medical readiness.   Patient plan of care discussed at interdisciplinary rounds: Yes    Anticipated Discharge Disposition: Home   Anticipated Discharge Services: Home infusion, OP HD  Anticipated Discharge DME: Bipap    Patient/family educated on Medicare website which has current facility and service quality ratings:  NA  Education Provided on the Discharge Plan: Yes  Patient/Family in Agreement with the Plan: Yes    Referrals Placed by CM/SW: Home infusion, DME, OP HD.  Private pay costs discussed: Not applicable    Additional Information:  Met w/patient to discuss discharge planning. Patient confirms that the home bipap through [employer] was delivered but the mask did not fit well overnight. Patient states she has a VM out to [employer]. Writer contacted Azar Courtney, he will follow up w/their RT and bring an alternative mask/see the patient this afternoon.     Patient was on enteral feedings through [facility] prior to admission, home infusion order placed at this time.     Patient will arrange her own rides to/from HD once discharge date is known. Previous RNCC confirmed that OP HD can accommodate pt's run of 3.5hrs at her current chair time.     3923 Addendum:  Pt confirmed that someone from [facility] came by and adjusted/swapped out her mask. RNCC will follow up tomorrow.     Discharge resources:    [facility] (home enteral feedings)  Phone: [phone]  Fax: [phone]      [facility] [place]   Chair time 5am T/TH/Sat  Phone: [phone]  Fax: [phone]     [facility] (patient will arrange rides once discharge date is known)  Phone: [phone]     [employer] (home O2 and bipap)  Phone: [phone]  Fax: [phone]   Azar Wilkerson: [phone]    Care coordination will continue to follow.     Lacey Pringle, RNCC,  THUAN    [facility]    [address]  [address] [place] [address]    [email or web address]  [email or web address]    Office: [phone] Pager: [phone]

## 2024-04-11 LAB
ALBUMIN SERPL BCG-MCNC: 3.5 G/DL (ref 3.5–5.2)
ALP SERPL-CCNC: 133 U/L (ref 40–150)
ALT SERPL W P-5'-P-CCNC: 11 U/L (ref 0–50)
ANION GAP SERPL CALCULATED.3IONS-SCNC: 16 MMOL/L (ref 7–15)
AST SERPL W P-5'-P-CCNC: 18 U/L (ref 0–45)
BASE EXCESS BLDV CALC-SCNC: 8.2 MMOL/L (ref -3–3)
BASOPHILS # BLD AUTO: ABNORMAL 10*3/UL
BASOPHILS # BLD MANUAL: 0.1 10E3/UL (ref 0–0.2)
BASOPHILS NFR BLD AUTO: ABNORMAL %
BASOPHILS NFR BLD MANUAL: 1 %
BILIRUB SERPL-MCNC: 0.3 MG/DL
BUN SERPL-MCNC: 76.8 MG/DL (ref 8–23)
CALCIUM SERPL-MCNC: 9 MG/DL (ref 8.8–10.2)
CHLORIDE SERPL-SCNC: 90 MMOL/L (ref 98–107)
CREAT SERPL-MCNC: 5.36 MG/DL (ref 0.51–0.95)
DEPRECATED HCO3 PLAS-SCNC: 30 MMOL/L (ref 22–29)
EGFRCR SERPLBLD CKD-EPI 2021: 9 ML/MIN/1.73M2
EOSINOPHIL # BLD AUTO: ABNORMAL 10*3/UL
EOSINOPHIL # BLD MANUAL: 0.8 10E3/UL (ref 0–0.7)
EOSINOPHIL NFR BLD AUTO: ABNORMAL %
EOSINOPHIL NFR BLD MANUAL: 14 %
ERYTHROCYTE [DISTWIDTH] IN BLOOD BY AUTOMATED COUNT: 17.3 % (ref 10–15)
GLUCOSE BLDC GLUCOMTR-MCNC: 163 MG/DL (ref 70–99)
GLUCOSE SERPL-MCNC: 107 MG/DL (ref 70–99)
HCO3 BLDV-SCNC: 35 MMOL/L (ref 21–28)
HCT VFR BLD AUTO: 30.3 % (ref 35–47)
HGB BLD-MCNC: 9.4 G/DL (ref 11.7–15.7)
IMM GRANULOCYTES # BLD: ABNORMAL 10*3/UL
IMM GRANULOCYTES NFR BLD: ABNORMAL %
INR PPP: 1.08 (ref 0.85–1.15)
LYMPHOCYTES # BLD AUTO: ABNORMAL 10*3/UL
LYMPHOCYTES # BLD MANUAL: 0.9 10E3/UL (ref 0.8–5.3)
LYMPHOCYTES NFR BLD AUTO: ABNORMAL %
LYMPHOCYTES NFR BLD MANUAL: 16 %
MAGNESIUM SERPL-MCNC: 1.9 MG/DL (ref 1.7–2.3)
MCH RBC QN AUTO: 33.8 PG (ref 26.5–33)
MCHC RBC AUTO-ENTMCNC: 31 G/DL (ref 31.5–36.5)
MCV RBC AUTO: 109 FL (ref 78–100)
MONOCYTES # BLD AUTO: ABNORMAL 10*3/UL
MONOCYTES # BLD MANUAL: 0.3 10E3/UL (ref 0–1.3)
MONOCYTES NFR BLD AUTO: ABNORMAL %
MONOCYTES NFR BLD MANUAL: 6 %
NEUTROPHILS # BLD AUTO: ABNORMAL 10*3/UL
NEUTROPHILS # BLD MANUAL: 3.5 10E3/UL (ref 1.6–8.3)
NEUTROPHILS NFR BLD AUTO: ABNORMAL %
NEUTROPHILS NFR BLD MANUAL: 63 %
NRBC # BLD AUTO: 0 10E3/UL
NRBC BLD AUTO-RTO: 0 /100
O2/TOTAL GAS SETTING VFR VENT: 23 %
OXYHGB MFR BLDV: 62 % (ref 70–75)
PCO2 BLDV: 63 MM HG (ref 40–50)
PH BLDV: 7.36 [PH] (ref 7.32–7.43)
PHOSPHATE SERPL-MCNC: 5.7 MG/DL (ref 2.5–4.5)
PLAT MORPH BLD: ABNORMAL
PLATELET # BLD AUTO: 214 10E3/UL (ref 150–450)
PO2 BLDV: 37 MM HG (ref 25–47)
POLYCHROMASIA BLD QL SMEAR: SLIGHT
POTASSIUM SERPL-SCNC: 3.9 MMOL/L (ref 3.4–5.3)
PROT SERPL-MCNC: 5.6 G/DL (ref 6.4–8.3)
RBC # BLD AUTO: 2.78 10E6/UL (ref 3.8–5.2)
RBC MORPH BLD: ABNORMAL
SAO2 % BLDV: 63.6 % (ref 70–75)
SODIUM SERPL-SCNC: 136 MMOL/L (ref 135–145)
WBC # BLD AUTO: 5.5 10E3/UL (ref 4–11)

## 2024-04-11 PROCEDURE — 99232 SBSQ HOSP IP/OBS MODERATE 35: CPT | Mod: GC | Performed by: STUDENT IN AN ORGANIZED HEALTH CARE EDUCATION/TRAINING PROGRAM

## 2024-04-11 PROCEDURE — 250N000013 HC RX MED GY IP 250 OP 250 PS 637

## 2024-04-11 PROCEDURE — 85007 BL SMEAR W/DIFF WBC COUNT: CPT

## 2024-04-11 PROCEDURE — 250N000012 HC RX MED GY IP 250 OP 636 PS 637

## 2024-04-11 PROCEDURE — 250N000012 HC RX MED GY IP 250 OP 636 PS 637: Performed by: NURSE PRACTITIONER

## 2024-04-11 PROCEDURE — 250N000013 HC RX MED GY IP 250 OP 250 PS 637: Performed by: STUDENT IN AN ORGANIZED HEALTH CARE EDUCATION/TRAINING PROGRAM

## 2024-04-11 PROCEDURE — 36415 COLL VENOUS BLD VENIPUNCTURE: CPT | Performed by: NURSE PRACTITIONER

## 2024-04-11 PROCEDURE — 83735 ASSAY OF MAGNESIUM: CPT | Performed by: STUDENT IN AN ORGANIZED HEALTH CARE EDUCATION/TRAINING PROGRAM

## 2024-04-11 PROCEDURE — 250N000013 HC RX MED GY IP 250 OP 250 PS 637: Performed by: INTERNAL MEDICINE

## 2024-04-11 PROCEDURE — 634N000001 HC RX 634: Mod: JZ | Performed by: STUDENT IN AN ORGANIZED HEALTH CARE EDUCATION/TRAINING PROGRAM

## 2024-04-11 PROCEDURE — 80053 COMPREHEN METABOLIC PANEL: CPT

## 2024-04-11 PROCEDURE — 82805 BLOOD GASES W/O2 SATURATION: CPT | Performed by: NURSE PRACTITIONER

## 2024-04-11 PROCEDURE — 90935 HEMODIALYSIS ONE EVALUATION: CPT

## 2024-04-11 PROCEDURE — 84100 ASSAY OF PHOSPHORUS: CPT | Performed by: STUDENT IN AN ORGANIZED HEALTH CARE EDUCATION/TRAINING PROGRAM

## 2024-04-11 PROCEDURE — 99233 SBSQ HOSP IP/OBS HIGH 50: CPT | Performed by: PHYSICIAN ASSISTANT

## 2024-04-11 PROCEDURE — 258N000003 HC RX IP 258 OP 636: Performed by: STUDENT IN AN ORGANIZED HEALTH CARE EDUCATION/TRAINING PROGRAM

## 2024-04-11 PROCEDURE — 120N000003 HC R&B IMCU UMMC

## 2024-04-11 PROCEDURE — 85610 PROTHROMBIN TIME: CPT | Performed by: STUDENT IN AN ORGANIZED HEALTH CARE EDUCATION/TRAINING PROGRAM

## 2024-04-11 PROCEDURE — 85027 COMPLETE CBC AUTOMATED: CPT

## 2024-04-11 PROCEDURE — 99233 SBSQ HOSP IP/OBS HIGH 50: CPT | Performed by: NURSE PRACTITIONER

## 2024-04-11 RX ADMIN — CYCLOSPORINE 125 MG: 25 CAPSULE, LIQUID FILLED ORAL at 07:57

## 2024-04-11 RX ADMIN — CLONIDINE HYDROCHLORIDE 0.1 MG: 0.1 TABLET ORAL at 21:22

## 2024-04-11 RX ADMIN — Medication 2.5 MCG: at 15:29

## 2024-04-11 RX ADMIN — LEVOTHYROXINE SODIUM 25 MCG: 0.03 TABLET ORAL at 07:57

## 2024-04-11 RX ADMIN — SEVELAMER CARBONATE 0.8 G: 800 POWDER, FOR SUSPENSION ORAL at 16:39

## 2024-04-11 RX ADMIN — LOPERAMIDE HCL 4 MG: 1 SOLUTION ORAL at 21:22

## 2024-04-11 RX ADMIN — Medication 40 MG: at 20:18

## 2024-04-11 RX ADMIN — CALCIUM CARBONATE 600 MG (1,500 MG)-VITAMIN D3 400 UNIT TABLET 1 TABLET: at 18:32

## 2024-04-11 RX ADMIN — LIDOCAINE 4% 1 PATCH: 40 PATCH TOPICAL at 20:18

## 2024-04-11 RX ADMIN — EPOETIN ALFA-EPBX 6000 UNITS: 10000 INJECTION, SOLUTION INTRAVENOUS; SUBCUTANEOUS at 10:57

## 2024-04-11 RX ADMIN — OLANZAPINE 5 MG: 5 TABLET, ORALLY DISINTEGRATING ORAL at 21:21

## 2024-04-11 RX ADMIN — LIDOCAINE AND PRILOCAINE: 25; 25 CREAM TOPICAL at 08:12

## 2024-04-11 RX ADMIN — PREDNISONE 5 MG: 5 TABLET ORAL at 07:57

## 2024-04-11 RX ADMIN — APIXABAN 2.5 MG: 2.5 TABLET, FILM COATED ORAL at 07:57

## 2024-04-11 RX ADMIN — MIDODRINE HYDROCHLORIDE 10 MG: 5 TABLET ORAL at 10:46

## 2024-04-11 RX ADMIN — CYCLOSPORINE 125 MG: 25 CAPSULE, LIQUID FILLED ORAL at 18:31

## 2024-04-11 RX ADMIN — AMIODARONE HYDROCHLORIDE 200 MG: 200 TABLET ORAL at 07:57

## 2024-04-11 RX ADMIN — Medication 2.5 MCG: at 20:18

## 2024-04-11 RX ADMIN — APIXABAN 2.5 MG: 2.5 TABLET, FILM COATED ORAL at 20:18

## 2024-04-11 RX ADMIN — SULFAMETHOXAZOLE AND TRIMETHOPRIM 1 TABLET: 400; 80 TABLET ORAL at 21:22

## 2024-04-11 RX ADMIN — LOPERAMIDE HCL 4 MG: 1 SOLUTION ORAL at 07:58

## 2024-04-11 RX ADMIN — SODIUM CHLORIDE 250 ML: 9 INJECTION, SOLUTION INTRAVENOUS at 10:58

## 2024-04-11 RX ADMIN — METOPROLOL TARTRATE 25 MG: 25 TABLET, FILM COATED ORAL at 20:18

## 2024-04-11 RX ADMIN — Medication: at 10:59

## 2024-04-11 RX ADMIN — CALCIUM CARBONATE 600 MG (1,500 MG)-VITAMIN D3 400 UNIT TABLET 1 TABLET: at 15:29

## 2024-04-11 RX ADMIN — Medication 40 MG: at 07:58

## 2024-04-11 RX ADMIN — SODIUM CHLORIDE 300 ML: 9 INJECTION, SOLUTION INTRAVENOUS at 10:58

## 2024-04-11 ASSESSMENT — ACTIVITIES OF DAILY LIVING (ADL)
ADLS_ACUITY_SCORE: 32

## 2024-04-11 NOTE — PROGRESS NOTES
Date: 4/11/2024  Time: 1445     Data:  Pre Wt:   41 kg  Desired Wt:   To be established  Post Wt:  38.5 kg (estimated)  Weight change: - 2.5 kg  Ultrafiltration - Post Run Net Total Removed (mL):  2500 ml  Vascular Access Status: Graft patent  Dialyzer Rinse:  Light  Total Blood Volume Processed: 67.2 L   Total Dialysis (Treatment) Time:   3.5 Hrs  Dialysate Bath: K 3, Ca 2.25  Heparin: Heparin: None     Lab:   No      Interventions:  Dialysis done through L AV Fistula using 15 gauge needles  UF set to 2.5 Liters, accommodating priming and rinse back volumes  ,     See Flowsheet for Crit Profile throughout the run  Treatment has ended safely and  blood is rinsed back completely  Decannulation done post HD, hemostasis is achieved in 10 minutes  Pressure dressing is applied, to be removed after 4-6 hours  Report given to PCN, sent back to her  room in stable condition     Assessment:  A/O x 4, calm and cooperative, denies pain  L arm AV Fistula has good thrill and bruit                Plan:    Per Renal team        Brandee Clark, MISAELN, RN  Acute Dialysis RN  [facility] [place]

## 2024-04-11 NOTE — PLAN OF CARE
[id number]    Neuro: A&Ox4.   Cardiac: HR 70-80s. -110s/60s. Afebrile.   Respiratory: Sating >88% on RA. BiPap overnight at 2200.  GI/: Anuric - pt on HD. Pt endorses diarrhea, imodium given x1 this shift.  Diet/appetite: Renal diet - no PO intake this shift. TF via J tube @ 40 mL/hr w/ Q4 30 mL FWF from 4520-1593.  Activity:  Independent, up ad magalie.   Pain: Denied pain this shift. Lidocaine patch in place on R hip.  Skin: No new deficits noted.  LDA's: R PIV - saline locked. L HD fistula. PEG/J - G clamped, J TF.     New BiPap mask was able to be tightened and pt was able to tolerate overnight.  Dialysis today.   Potential discharge today.     Plan: Continue with POC. Notify primary team with changes.

## 2024-04-11 NOTE — PROGRESS NOTES
Care Management Follow Up    Length of Stay (days): 61    Expected Discharge Date: 4/15?     Concerns to be Addressed: Discharge planning, medical readiness.   Patient plan of care discussed at interdisciplinary rounds: Yes    Anticipated Discharge Disposition: Home  Anticipated Discharge Services: Home infusion, OP HD  Anticipated Discharge DME: Bipap    Patient/family educated on Medicare website which has current facility and service quality ratings:  NA  Education Provided on the Discharge Plan: Yes  Patient/Family in Agreement with the Plan: Yes    Referrals Placed by CM/SW: Home infusion, DME, OP HD.  Private pay costs discussed: Not applicable    Additional Information:  Per update from the primary team, patient is not medically ready for discharge, pulm would like to monitor the patient further while adjusting her bipap settings. Per primary team, pulm provider will contact [employer] directly to provide updated bipap settings.     Per chart review, therapy is recommending outpatient pulmonary rehab, [facility],  left w/pulm rehab department to confirm fax number for referral.      No additional needs/concerns at this time.     Discharge resources:     [facility] (home enteral feedings)  Phone: [phone]  Fax: [phone]      [facility]   Chair time 5am T/TH/Sat  Phone: [phone]  Fax: [phone]     [facility] (patient will arrange rides once discharge date is known)  Phone: [phone]     [employer] (home O2 and bipap)  Phone: [phone]  Fax: [phone]   Rep, Azar: [phone]    [facility] hospital (OP pulm rehab)  Phone: [phone], ext. [phone].  Fax: Number needed     Care coordination will continue to follow.     Lacey Pirngle, RNCC, BSN    [facility]    [address]  [address] [place] [address]    [email or web address]  [email or web address]    Office: [phone] Pager:  [phone]

## 2024-04-11 NOTE — PROGRESS NOTES
Nephrology Progress Note  04/11/2024         Assessment & Recommendations:   Sofie Rodriguez is a 63 year old year old female with ESKD, COPD s/p b/l lung transplant in 6/2022 with multiple complications, gastroparesis s/p GJ tube, GIB, chronic diarrhea, recurrent c-diff, FTT with inability to tolerate any tube feeds, R hip fx s/p ORIF 12/2023, admitted on 2/10 for initiation of TPN/lipids, transferred to ICU on 2/17 with worsening mental status and respiratory acidosis in spite of bipap, ultimately intubated on 2/18, being treated for PNA, also with volume overload in setting of high volume TPN. Persistent respiratory acidosis in spite of volume off, transferred to ICU for hypotension and respiratory failure, improved on bipap, now floor status again 3/1. Transferred to ICU 3/18 again with worsening hypercapnic respiratory failure. Transferred to floor 3/20, back to ICU 3/24, now stable and preparing for discharge    # ESKD - TTS, LUE AVG, 3hr, prior to admission EDW 45.5 kg, now much lower at 38.5 kg, [facility], Dr. Andres Fairbanks.  - HD per TTS schedule. Pt is agreeable to 3.5 hr runs if it means avoiding 4x/week dialysis  - Requires EMLA cream an hour before HD  - please avoid PICC which will compromise future dialysis accesses; a midline is much preferred for this patient    #Recurrent respiratory acidosis:    - now tolerating bipap at night and when napping  - transplant pulm following  - re-intubated 3/24 for bronch/BAL (NGTD), status improving with less trach support. Extubated 4/2, CO2 continuing to rise in spite of bipap  - dialyzing on bicarb 38 last HD and again today (concern had been that additional bicarb would fix the pH but worsen the respiratory acidosis; however for unclear reasons, pt's bicarb is improving so will continue the bicarb dose on HD)    # Aflutter: on amio and BB  # Volume/BP: Anuric; on metoprolol soln 25 mg bid   - new EDW 38.5-39 kg  - UF 2.5 kg on Thurs/Sat and 3kg on  "Tues generally        # Nutrition: on [employer] tube feeds and regular diet    # Anemia 2/2 ESKD  On Venofer 50 qwk, Mircera last dose 1/9/2024  - hgb 9-10's  - iron labs 3/31: ferritin 1007, iron 34, IS 26 (at goal)  - Will continue Venofer 50 mcg q week (Tuesday)  - continue epogen 6000 units via HD    # BMD:   - Ca 8-9's, phos 5.7, alb 3.5  - on sevelamer 0.8g pck bid          Recommendations were communicated to primary team via note and in person    Pema Haider PA   Division of Renal Disease and Hypertension  [phone]    Interval History :   Seen on dialysis, 2.5L off.  Dialyzing again on bicarb 38, CO2 is improving. No n/v, CP, SOB, chills    Review of Systems:   4 point ROS neg other than as noted above    Physical Exam:   I/O last 3 completed shifts:  In: 1040 [NG/GT:240]  Out: -    /62 (BP Location: Right arm, Cuff Size: Adult Small)   Pulse 67   Temp 98.3  F (36.8  C) (Oral)   Resp 19   Ht 1.57 m (5' 1.81\")   Wt 41 kg (90 lb 6.2 oz)   SpO2 100%   BMI 16.63 kg/m       GENERAL APPEARANCE: NAD  PULM: CTA  CV: RRR    Edema: none  GI: soft   INTEGUMENT: no cyanosis, no rashes on exposed skin  NEURO: a/o3  Access Left AVG     Labs:   All labs reviewed by me  Electrolytes/Renal -   Recent Labs   Lab Test 04/11/24  0443 04/10/24  0621 04/09/24  0710    137 141   POTASSIUM 3.9 3.8 4.8   CHLORIDE 90* 90* 97*   CO2 30* 32* 26   BUN 76.8* 50.2* 100.0*   CR 5.36* 3.76* 6.59*   * 112* 84   ESTUARDO 9.0 8.9 9.1   MAG 1.9 1.8 2.3   PHOS 5.7* 4.6* 6.0*       CBC -   Recent Labs   Lab Test 04/11/24  0443 04/10/24  0621 04/09/24  0710   WBC 5.5 5.7 6.9   HGB 9.4* 10.3* 9.4*    235 259       LFTs -   Recent Labs   Lab Test 04/11/24  0443 04/10/24  0621 04/09/24  0710   ALKPHOS 133 113 118   BILITOTAL 0.3 0.2 0.2   ALT 11 9 10   AST 18 18 16   PROTTOTAL 5.6* 6.0* 5.8*   ALBUMIN 3.5 3.7 3.5       Iron Panel -   Recent Labs   Lab Test 03/31/24  0456 09/26/22  0555 09/03/22  1039   IRON 34* 54 " 21*   IRONSAT 26 22 9*   CARLOS 1,007* 769* 343*         Imaging:  All imaging studies reviewed by me.     Current Medications:  Current Facility-Administered Medications   Medication Dose Route Frequency Provider Last Rate Last Admin    amiodarone (PACERONE) tablet 200 mg  200 mg Oral or Feeding Tube Daily Sara Jorge APRN CNP   200 mg at 04/11/24 0757    apixaban ANTICOAGULANT (ELIQUIS) tablet 2.5 mg  2.5 mg Oral BID Beni Mccullough MD   2.5 mg at 04/11/24 0757    calcium carbonate-vitamin D (CALTRATE) 600-10 MG-MCG per tablet 1 tablet  1 tablet Per J Tube TID w/meals Maribell Cloud MD   1 tablet at 04/10/24 1734    cloNIDine (CATAPRES) tablet 0.1 mg  0.1 mg Oral At Bedtime Virginia Fowler MD   0.1 mg at 04/10/24 2234    [Held by provider] cyanocobalamin (VITAMIN B-12) tablet 500 mcg  500 mcg Per Feeding Tube Daily Maribell Cloud MD   500 mcg at 03/22/24 0754    cycloSPORINE modified (GENERIC EQUIVALENT) capsule 125 mg  125 mg Oral BID IS Nguyen Johnson CNP   125 mg at 04/11/24 0757    fiber modular (BANATROL TF) packet 1 packet  1 packet Per Feeding Tube Q12H Maribell Cloud MD   1 packet at 04/11/24 0801    fluticasone (FLONASE) 50 MCG/ACT spray 1 spray  1 spray Both Nostrils Daily Beni Mccullough MD        heparin lock flush 10 UNIT/ML injection 5-20 mL  5-20 mL Intracatheter Q24H Betty Diaz MD   5 mL at 04/02/24 1416    levothyroxine (SYNTHROID/LEVOTHROID) tablet 25 mcg  25 mcg Oral Daily Tg Arita DO   25 mcg at 04/11/24 0757    Lidocaine (LIDOCARE) 4 % Patch 1 patch  1 patch Transdermal Q24h Maribell Cloud MD   1 patch at 04/10/24 2028    liothyronine (CYTOMEL) half-tab 2.5 mcg  2.5 mcg Oral BID Kalpana Merino APRN CNP   2.5 mcg at 04/10/24 2029    metoprolol tartrate (LOPRESSOR) tablet 25 mg  25 mg Per J Tube BID Sara Jorge APRN CNP   25 mg at 04/10/24 2029    OLANZapine zydis (zyPREXA) ODT tab 5 mg  5 mg Oral At Bedtime Betty Diaz MD   5 mg  at 04/10/24 2234    pantoprazole (PROTONIX) 2 mg/mL suspension 40 mg  40 mg Per J Tube BID Maribell Cloud MD   40 mg at 04/11/24 0758    predniSONE (DELTASONE) tablet 5 mg  5 mg Per J Tube QAM Maribell Cloud MD   5 mg at 04/11/24 0757    sevelamer carbonate (RENVELA) Packet 0.8 g  0.8 g Oral BID w/meals Tg Arita, DO   0.8 g at 04/10/24 1734    sodium chloride (PF) 0.9% PF flush 10 mL  10 mL Intracatheter Q8H Betty Diaz MD   10 mL at 04/11/24 0344    sodium chloride (PF) 0.9% PF flush 10-40 mL  10-40 mL Intracatheter Q8H Betty Diaz MD   10 mL at 04/10/24 2233    sodium chloride (PF) 0.9% PF flush 9 mL  9 mL Intracatheter During Dialysis/CRRT (from stock) Tg Arita, DO        sodium chloride (PF) 0.9% PF flush 9 mL  9 mL Intracatheter During Dialysis/CRRT (from stock) Tg Arita, DO        sulfamethoxazole-trimethoprim (BACTRIM) 400-80 MG per tablet 1 tablet  1 tablet Oral Once per day on Tuesday Thursday Saturday Claribel Franks MD   1 tablet at 04/09/24 1957     Current Facility-Administered Medications   Medication Dose Route Frequency Provider Last Rate Last Admin    dextrose 10% infusion   Intravenous Continuous PRN Miguel Angel Stone MD        Patient is already receiving anticoagulation with heparin, enoxaparin (LOVENOX), warfarin (COUMADIN)  or other anticoagulant medication   Does not apply Continuous PRN Ting Aceves MD        Patient may continue current oral medications   Does not apply Continuous PRN Nguyen Johnson CNP Dawn M Fuglestad, PA

## 2024-04-11 NOTE — PROGRESS NOTES
[facility]    Progress Note - Medicine Service, NILE TEAM 3       Date of Admission:  2/10/2024    Assessment & Plan   Sofie Rodriguez is a 63 year old female with PMH COPD s/p bilateral lung transplant 6/28/22 c/b hemidiaphragm palsy and recurrent pneumonias, gastroparesis and small bowel dysmotility complicated by severe malnutrition now s/p PEG/J, ESRD on M/W/F HD, recent R femoral fx s/p ORIF, chronic diarrhea, recurrent c-diff, failure to thrive with inability to tolerate any tube feeds, who was admitted to [facility] on 2/10/24 for concerns over malnutrition and TPN initiation via portacath. Course complicated by recurrent and progressive acute on chronic hypoxic and hypercarbic respiratory failure requiring multiple ICU admissions and intubation 2/18-2/19, 2/28-2/29, 3/18-3/20, for continuous BiPAP. Again with worsening respiratory acidosis and hypercarbia, concern for infection vs acute rejection, requiring transfer back to ICU 3/20/2024 for intubation and bronchoscopy. Weaned off ventilator to RA and BIPAP at night. Transferred to medicine on 4/4/2024.     Changes today:   - pCO2 improved from 67 to 63  - Orders adjusted in Epic by pulm for home AVAPS: Decreased TV and inspiratory pressure  - Working on getting adjustment communicated to DME RT so that adjustments can be made on machine (pulm to contact DME company)  - Nutrition adjusting TF rate and length  - Will CTM on home BIPAP machine with daily VBGs to make sure hypercapnia is controlled  - Planning to monitor patient until Monday      # Acute on chronic hypoxic and hypercarbic respiratory failure requiring intubation 2/17-2/19 3/25-4/2, improved  # Recurrent PNAs, concern for acute infection vs acute rejection  # S/p BSLT 6/8/22 for COPD  # R hemidiaphragm palsy   # Positive DSA   # Respiratory acidosis, improved with high bicarb bath with HD on 4/9/2024   Hx bilateral lung transplant on  6/28/2022 for COPD.  Initially admitted on 2/10/2024 for initiation of TPN.  However she was admitted to the ICU on 2/17/2024 for hypoxic hypercarbic respiratory failure.  She was intubated on 2/18 - 2/19 due to pulmonary edema vs infection.  Was extubated onto BiPAP/AVAPS with improvement in mentation however continued to have respiratory acidosis.She was transferred to medicine floor on 2/29.  Was transferred back to the ICU on 3/18 - 3/20 due to hypercarbia and severe respiratory acidosis that did not require intubation.  Patient again experience worsening respiratory acidosis and hypercarbia will concern for infection VS acute rejection that required transfer back to ICU on 3/20/2024 for intubation and bronch.  She was weaned off the ventilator to room air and BiPAP at night.  Transferred to medicine on 4//2024.  She has had imaging to evaluate neurologic causes of decreased respiratory drive, without any notable findings.  Mentation remains stable despite low pH and increasing pCO2.  Volume status has improved with hemodialysis.  Overall her pCO2 retention is thought to be multifactorial, with a component of overfeeding through TPN, infection, bicarb loss through GI sources. Previously limited by intolerance d/t claustrophobia, but this has improved significantly.  - Daily VBG with adjustments to NIPPV   - Transplant pulm following, appreciate recs  - Immunosuppression:   > Prednisone 5 mg every morning, 2.5 mg every afternoon  > Cyclosporine 100mg BID (Stopped tacrolimus 3/10)   > Overnight oximetry study suggestive of O2 vs CPAP need, as did require up to 2LPM overnight to prevent hypoxia  > Minimize O2 to preserve respiratory drive, given IVIG for DSA+   > D/c'd theophylline 3/3/24 due to difficulty attaining therapeutic levels (started by ICU), could consider Modafinil   - Airway clearance/nebs:  --> resume BID scheduled if secretions worsen/thicken  - Xopenex neb BID PRN  - Mucomyst stopped 4/4   - Volume  removal with iHD   - Increased bicarb bath with dialysis on 4/9/2024  - NIPPV settings   - AVAPS at Tv 375 (425 prior), 12 minimum mmHg, max 25, and EPAP 5  - BIPAP/AVAPS at hs and naps at all times. Ideally 7-8 hours overnight, limited by claustrophobia, medications as below  - Home BIPAP delivered from home, patient had issues with leakage around mask overnight, troubleshooting continues  - Zyprexa 5mg at bedtime   - Atarax 25 mg TID PRN for anxiety  - Clonidine 0.1 mg at bedtime  - High risk for reintubation given hx of hypercarbia     L Ear pain and muffled hearing, improving  Rhinorrhea, resolved  Patient notes 3 days of L ear pain and mild rhinorrhea without cough or worsening SOB over the last 2-3 days. No systemic signs of new infection. No discharge from L ear on exam. Moderate ear wax burden. No bleeding or erythema. Low concern for acute otitis media. Could be a viral URI vs discomfort from using BIPAP/AVAPs more consistently leading to pressure imbalance across her tympanic membrane.   - CTM for now  - Daily fluticasone nasal sprays started on 4/7/2024     # Goals of Care  - 3/15 Care conference on with Transplant Pulm, Pall Care, Medicine, daughters (Julia Nash) and Transplant SW. Overall medical updates provided and Qs answered. With declining respiratory acidosis on labs, discussed code status 3/18. Patient initially not desiring any escalation of her care to ICU/intubation with frustration re overall course. However, after discussing with her daughter on the phone she and family elected to remain full code and agreed to ICU admission and intubation if necessary.   - 3/29 Care conference: Laid out a few options for family and patient to consider with qs answered. Examples being we could attempt to extubate to BiPAP but plan should be established prior to extubation that if patient decompensates and requires reintubation then likely pursue trach versus more comfort approach. Other option is going  straight for trach now. Patient and family (daughter Julia and son present on Ipad) considering the options and had a lot of questions about trach and what that would look like long-term, which was laid out for the family and patient.   - Pt made the decision this hospitalization, that if needed down the line she would be acceptable of a tracheostomy     # Chronic osmotic diarrhea/SIBO s/p Rifaximin  # Recurrent C.Diff (3rd ep 3/12/24)    # Diarrhea, improving  Recurrent C.diff 3/12, Fidaxomicin x 10 days (3/13-3/22), with improvement in diarrhea. Transplant pulm requesting repeat colonoscopy w/ Bx after completion of c.diff treatment, to r/o toxicity or other reason that diarrhea is present.  C.diff and enteric panel on 4/4/2024 was unremarkable.  Patient initially agreeable to colonoscopy, however later noted that she would like to defer colonoscopy for concerns regarding her weight and reintubation for the procedure. Diarrhea improving with PRN imodium.   - GI consult for recurrent diarrhea in the MICU, appreciate recs   - C.diff and enteric panel negative  - Confirmed Osmotic diarrhea with stool studies--> Likely not infectious, continue adjusting TF and loperamide PRN  - Bowel regimen PRN  - Imodium PRN     # Severe malnutrition   # Failure to thrive  # Hypoalbuminemia  # Gastroparesis, small bowel dysmotility  # S/P PEG/J with intolerance of enteral nutrition  Patient with gastroparesis (presumed due to vagal injury) and small bowel dysmotility complicated by unintentional 40lb weight loss over the past year and now severe malnutrition. Previously intolerant to oral food intake due to nausea. Was initially admitted for portacath and TPN initiation since 2/13. Intermittently tolerating feeds without n/v from 2/20.  Per GI, no ongoing concerns for SIBO as of 2/23. As of 3/11 transplant pulmonology is worried that TPN is not a realistic plan to continue at home and transitioned back to TF (RD oncerned pt is not  absorbing any oral intake). TPN discontinued 3/16. Transplant pulm also worried about mucosal toxicity.   - Metabolic cart 24 following 24 hours of calorie counts   - Nutrition consulted, appreciate assistance  - Continue TF at goal rate 35 ml/hr via PEG/J   > Working on consolidating as much as possible    > Ask if J feeds must be held for synthroid or not  - Speech saw pt 4/3 okay for regular diet with thin liquids; will continue pt tube feeds via pts g/j tube (hx of gastroparesis requiring TPN) until pt able to take in adequate PO nutrition          - PEGJ dislodged on 2024 and replaced with IR on 2024    # Recurrent pneumonia, concern for acute infection vs rejection  # Recurrent C.Diff colitis (3rd ep 3/12/24)  # Hx EBV viremia   # Hypogammaglobulinemia  # Chronic immunosuppression  lung transplant  Empirically treated for pneumonia  - , RVP and cultures no growth to date. Recurrent C.Diff Positive 3/12, s/p PO Fidaxomicin 200 mg BID for 10 days (3/13-3/22) with improvement in diarrhea. Remains afebrile, leukocytosis resolved.  Ig, s/p IVIG.  EBV 27K (decreased compared to prior).     - Follow up BAL and blood cultures from 3/24, NGTD     Antibiotics:  Zosyn ( - , 3/24 - )   Bactrim (MWF, PJP ppx, previously on Dapsone)  Vancomycin ( - , 3/24-3/25)  Micafungin (- , 3/24 x1)  Fidaxomicin (3/13-3/22)     Micro:   - BAL 3/24:   - Cell count w diff: PMN 75%, lymphocytes 5, monocytes 19, eos 1  - No organisms seen on Gram stain  - RVP, HSV, Histoplasma neg  - Fungal & bacterial cultures NGTD  - EBV, CMV, PJP, Aspergillus, Legionella, Mycoplasma, AFB in process  - Bcx 3/24 NGTD    Chronic issues    # A-flutter (recurrent on 3/17)  # A-fib, intermittent  # HTN  # HFpEF  Noted Afib/flutter intermittently throughout admission. Was started on amiodarone bolus with drip and transitioned to PO dosing.   TTE: LVEF 55-60%, global RV function normal, no  significant valvular abnormalities. Briefly required levophed and midodrine for HD but otherwise stable off pressors.   - MAP goal > 65 mmHg   - Continue Metoprolol tartrate dose 25 mg BID (hold parameters if MAP<65 or hr less than 60, hold on dialysis days)  - Continue amiodarone 200 mg PO  - Midodrine with HD as needed  - Restarted Apixaban now that patient not wanting colonoscopy     # Hypothyroidism  Patient with new (2/17/24) low T4 at 0.64 and TSH at 6.5, concerning for new hypothyroidism vs sick thyroid syndrome. TSH with appropriate response, more consistent with elevation in the setting of acute illness. ICU team on 2/28 recommended initiation of treatment for possible hypothyroid as this can improve respiratory muscle function in the setting of weakness and malnutrition. 3/7, 3/15, 3/20 repeat thyroid function WNL.  - Continue liothyronine + levothyroxine -- note that prolonged combination therapy is not optimal & regimen should be re-evaluated (likely stop liothyronine, up-titrate levothyroxine) in post-acute setting    # Hx of Anxiety   # Claustrophobia  Reports claustrophobia contributing to limited compliance with BiPAP. Has seen health psych on 3/20, recommended grounding exercise (5-4-3-2-1) for use on BiPAP.   - Zyprexa 5mg at bedtime   - Atarax 25 mg TID PRN for anxiety  - Clonidine 0.1 mg at bedtime    # ESRD on HD TTS   # Mild hyperphosphatemia  Patient is ESRD on T/Th/Sat HD as outpt, now approx M/W/F inpatient. HD tolerating until 3/23. Briefly required extra Monday runs, not since being off TPN. She would prefer longer sessions to more days.  - Midodrine 10mg BID PRN with dialysis days   - Discuss with nephrology if there are additional strategies to optimize bicarb   - Sevelamer per Nephrology   - CBC and CMP daily  - Strict I/Os  - Daily weight   - Renally adjust medications     #Acute on chronic anemia 2/2 ESRD  Hgb stable, with no acute signs of bleeding.   - Daily CBC  - Transfuse for Hgb  < 7  - Continue Epogen with dialysis, held in setting of concern for acute infection   - Continue Venofer 50 mcg qweek with dialysis, held in setting of concern for acute infection    # Suspected CARLEE  # PTA nocturnal O2, 2L   - Sleep clinic eval at discharge for suspected CARLEE    # GERD  - PPI BID    # Hyperglycemia, stress induced  Has been euglycemic for the past few days. Not on insulin.  - Hypoglycemia protocol     Diet: Snacks/Supplements Adult: Other; Allow patient to order any supplement PRN if requested; Between Meals  Renal Diet (dialysis)  Adult Formula Drip Feeding: Specified Time Eneida Stoll Renal Support; Jejunostomy; Goal Rate: 40 mL/hr x 18 hours daily (2864-3223); mL/hr; From: 1:00 PM; To: 7:00 AM    DVT Prophylaxis: DOAC  Dong Catheter: Not present  Fluids: None  Lines: None       Cardiac Monitoring: None  Code Status: Full Code      Clinically Significant Risk Factors              # Hypoalbuminemia: Lowest albumin = 2.6 g/dL at 2/18/2024  5:13 AM, will monitor as appropriate               # Severe Malnutrition: based on nutrition assessment    # Financial/Environmental Concerns: none         Disposition Plan   Pending medical stability and home BIPAP/AVAP coordination     The patient's care was discussed with the Attending Physician, Dr. Pantoja .    Lachelle Skinner MD  Medicine Service, [address]  Securely message with [employer] (more info)  Text page via [facility] Paging/Directory   See signed in provider for up to date coverage information  ______________________________________________________________________    Interval History   NAEON. pCO2 improved this AM from prior. Tolerated avaps machine overnight (home machine) with adjustment to mask fit made yesterday. Pt reports she is doing well. Felt the avaps was 'blowing' air strongly into her face last night, but otherwise tolerated okay. Able to wear it the required amount.    Physical Exam    Vital Signs: Temp: 97.8  F (36.6  C) Temp src: Oral BP: 106/64 Pulse: 73   Resp: 18 SpO2: 96 % O2 Device: BiPAP/CPAP    Weight: 90 lbs 6.22 oz    General: Awake, alert & oriented. Frail appearing. Sitting in bed reading book  HEENT: Mucous membranes moist  Neuro: Awake and alert, no focal deficits, moving all extremities to command and spontaneously  Pulm/Resp: Clear breath sounds bilaterally, diminished on R>L, no accessory muscle use  CV: RRR, S1/S2 without m/r/g; pedal pulses 2+ without LE edema  Abdomen: Soft, non-distended, non-tender, in the morning 18 Fr in place with dressings on top without leakage or drainage.  : Rectal tube in place, (+) bruit/thrill in LUE fistula  Incisions/Skin: PICC and PEG site without surrounding erythema, no rashes noted    Medical Decision Making       Please see A&P for additional details of medical decision making.      Data   ------------------------- PAST 24 HR DATA REVIEWED -----------------------------------------------    I have personally reviewed the following data over the past 24 hrs:    5.5  \   9.4 (L)   / 214     136 90 (L) 76.8 (H) /  107 (H)   3.9 30 (H) 5.36 (H) \     ALT: 11 AST: 18 AP: 133 TBILI: 0.3   ALB: 3.5 TOT PROTEIN: 5.6 (L) LIPASE: N/A     TSH: N/A T4: N/A A1C: N/A     INR:  1.08 PTT:  N/A   D-dimer:  N/A Fibrinogen:  N/A       Imaging results reviewed over the past 24 hrs:   No results found for this or any previous visit (from the past 24 hour(s)).

## 2024-04-11 NOTE — PROGRESS NOTES
CLINICAL NUTRITION SERVICES - REASSESSMENT NOTE     Nutrition Prescription    Malnutrition Status:    Severe malnutrition in the context of chronic illness     Recommendations already ordered by Registered Dietitian (RD):  Continue cyclic feeds as ordered (18-hour cycle) via J-tube as below:   - Cosential Renal Support (or equivalent) at 40 mL/hr x 18 hours/day to provide: 720 mL formula, 1296 Kcals, 58 gm protein, 122 gm CHO, 14 gm fiber, 482 mL free water daily.   - FWF for FT patency as ordered; 30 mL Q4hrs    Continue PO as tolerated with TF as ordered to promote weight restoration.     Future/Additional Recommendations:  Monitor nutrition-related findings and follow pt per protocol     EVALUATION OF THE PROGRESS TOWARD GOALS   Diet: Renal/dialysis   Supplement: PRN if requested      PO Intake: 100% of small frequent meals; however dependent on TFs as primary nutrition source with gastroparesis and small bowel dysmotility hx.     Nutrition Support:   -Dosing weight: 46 kg (EDW)   -EN access via J-tube  -Recent TF Regimen:   3/14-4/10: Cosential Renal Support @ 35 mL/hr x 22 hours/day (held for 1 hour before/after Synthroid) = 770 mL formula, 1386 Kcals, 62 gm pro, 131 gm CHO, 15 gm fiber, 516 mL free water daily   + Banatrol BID     4/10-current:: Cosential Renal Support at 40 mL/hr x 18 hours/day to provide: 720 mL formula, 1296 Kcals, 58 gm protein, 122 gm CHO, 14 gm fiber, 482 mL free water daily.      -FWF 30 mL Q4hrs (180 mL/day) + 482 mL from TF for total free water provision of 662 mL/day    EN Intake - Average 7-day TF intake: 676 mL formula, 1218 Kcals (26 Kcal/kg), and 54 g pro (1.2 g/kg). This is meeting 88% of low-end est Kcal needs, and 99% of low-end est protein needs.     NEW FINDINGS   General:  Writer met with patient yesterday to discuss shortening of TF cycle duration per MD request. Writer attempted see patient x3 today but has not been in room. No changes to nutrition POC today.       GI:  0-5 unmeasured BMs per day over past week, per I/O.     Weights:  Variable with HD/fluid status; lowest/driest wt this admission of 38.5 kg (bedscale measure) on 4/4/24    Labs:  Reviewed     Medications:  Reviewed     Skin:  No new concerns noted    MALNUTRITION  % Intake: Decreased intake does not meet criteria - TFs meet needs  % Weight Loss: Unable to assess - confounded by fluid status  Subcutaneous Fat Loss: Global/full body: severe   Muscle Loss: Global/full body: severe  Fluid Accumulation/Edema: Does not meet criteria - trace  Malnutrition Diagnosis: Severe malnutrition in the context of chronic illness     Previous Goals   Total avg nutritional intake to meet a minimum of 30 kcal/kg and 1.3 g PRO/kg daily (per dosing wt 46 kg).   Evaluation: Likely met with TF + PO    Previous Nutrition Diagnosis  Malnutrition related to history of EN intolerance as evidenced by prior severe weight loss with severe fat and muscle wasting     Evaluation: No change    CURRENT NUTRITION DIAGNOSIS  Malnutrition related to history of EN intolerance as evidenced by prior severe weight loss with severe fat and muscle wasting       INTERVENTIONS  Implementation  Enteral Nutrition - Continue 18-hour cyclic TFs + PO with goal for weight restoration     Goals  Total avg nutritional intake to meet a minimum of 30 kcal/kg and 1.2 g PRO/kg daily (per dosing wt 46 kg).    Monitoring/Evaluation  Progress toward goals will be monitored and evaluated per protocol.    Cortez Maxwell, MS, RDN, LD, CNSC  Available by [place] or phone   [place]: M-F (7:00-3:30)  6B Clinical Dietitian  or 2 Obs Clinical Dietitian  Weekend/Holiday (7:00-3:30) - Weekend Clinical Dietitian   6B RD desk: [phone]       **Clinical Dietitians are no longer available by pager.

## 2024-04-11 NOTE — PROGRESS NOTES
Pulmonary Medicine  Cystic Fibrosis - Lung Transplant Team  Progress Note  2024     Patient: Sofie Rodriguez  MRN: [id number]  : 1960 (age 63 year old)  Transplant: 2022 (Lung), POD#[id number]  Admission date: 2/10/2024    Assessment & Plan:     Sofie Rodriguez is a 63 year old female with h/o COPD s/p BSLT () with course complicated by post-operative hemidiaphragm palsy, recurrent PNAs, positive DSA, EBV viremia, hypogammaglobulinemia, severe gastroparesis s/p G/J tube placement (22) and pyloric botox (23), GI bleed 2/2 pyloric ulcer, hemobilia s/p ERCP and MRCP, chronic diarrhea, recurrent C diff colitis, ESRD on iHD, recurrent falls with injuries (recent right hip fx s/p ORIF 2023), deconditioning, and FTT.  Admitted 2/10 for failure to thrive and initiation of TPN/lipids.  Emergent intubation - for hypoxic and hypercapneic respiratory failure and encephalopathy. Returned to ICU - and again 3/18-3/20 d/t tenuous respiratory status complicated by variable daytime NIPPV tolerance and hypervolemia with iHD limited d/t hypotension. Again transferred back to ICU 3/24 for intubation and bronch/BAL given hypoxic and hypercapneic respiratory failure. CT with extensive bilateral infiltrate and etiology likely multifactorial including volume overload and infection, possible mucous plugging, ACR or AMR less likely.  Extubated , no daytime hypoxia and hypercapnia remains stable with good adherence to NIPPV with sleep.  Tolerating PO, diarrhea more stable.  Progress acidosis and hypercapnia since 4/3 despite excellent adherence to overnight NIPPV and adjustments in settings, improved with bicarbonate bath adjustment per nephrology, still without daytime hypoxia.  Discharge home potentially Monday pending tolerance to home NIPPV and VBG monitoring following dialysis adjustments.       Today's recommendations:  - Continue daily morning VBG  - AVAPS (using home device while  inpatient) to continue with decrease to  ml and inspiratory max pressure to 25 (discussed/order updated with [employer])  - May need to revisit neuro assessment for factors contributing to progressive hypercapnic acidosis  - CSA repeat level ordered 4/12  - DSA, CMV, and EBV due 4/23  - Consider cycling TF, encourage TID supplemental shakes daily to meet PO nutritional goals     Acute on chronic hypoxic/hypercapneic respiratory failure:  S/p bilateral sequential lung transplant for COPD:   Hypoventilation, Suspected CARLEE:  PFTs in clinic remained significantly below her baseline.  Baseline hypoxia with 2L NC overnight PTA.  S/p intubation 2/17-2/19 for acute hypoxic/hypercapneic respiratory failure with encephalopathy, CT with concern for infection and pulmonary edema given recent addition of TPN nutrition.  Bronch (2/18) with very friable tissue, cutlures only with C. glabrata.  Persistent respiratory failure also complicated by hypoventilation and deconditioning d/t malnutrition. Neurology consulted 2/27, MRI brain (3/1) without acute intracranial pathology.  SNIFF (3/8) normal.  Metabolic CART suggested overfeeding on TPN.  Returned to ICU (3/18-3/20) d/t tenuous respiratory status complicated by NIPPV intolerance and hypervolemia with iHD limited d/t hypotension (CXR with increased pulmonary edema).  Overall, her PCO2 retention is likely multifactorial with a component being from overfeeding (though remedied with nutrition adjustment), metabolic compensation barrier d/t renal failure, pulmonary edema, bicarb loss with diarrhea, hypoventilation with declining NIF and FVC concerning for neuromuscular etiology (though Neurology consult was not revealing), and NIPPV intolerance due to claustrophobia. Worsening hypoxic and hypercapneic respiratory failure 3/24 and transferred to ICU for intubation. CT chest with extensive bilateral infiltrates markedly increased from previous.  Bronch with small L>R sided  secretions easily suctioned, BAL with no evidence of DAH, non bloody.  S/p Zosyn (3/23-4/2) for 10 day empiric course.  Extubated 4/2, hypercapnia stable with consistent overnight BiPAP use.  CXR (4/8) with stable bibasilar opacities and small left pleural effusion.  Progressive hypercapnia and acidosis despite transition from BiPAP to AVAPS (due to decreasing TV) and gradual increase in AVAPS TV, pt with excellent adherence to use overnight and with naps. Improved with bicarbonate bath adjustment per nephrology.  - Daily morning VBG (Bicarb bath may contribute to worsened hypercapnia over time)  - AVAPS (using home long-term device while inpatient since 4/9) to continue with decrease to  ml and inspiratory max pressure to 25 on 4/11 (discussed/order updated with DME company) (ideal body weight for height up to 130# correlating with lower limit of TV at 6 ml/kg of 360 ml and upper limit of TV at 8 ml/kg of 470 ml)  - May need to revisit neuro assessment for factors contributing to progressive hypercapnic acidosis     Immunosuppression:  AZA previously stopped 5/2023 d/t low ImmuKnow assay and EBV viremia.  ImmuKnow (2/27) remained low at 88.  ImmuKnow (4/1) further decreased to 43.  Transitioned from [place] to CSA 3/11.  -  mg BID (increased 4/9 and transitioned to PO).  Goal 120-140 (decreased 4/4 for ImmuKnow).  Repeat level 4/12 (ordered).  - Prednisone 5 mg daily     Prophylaxis:   - Bactrim qMWF for PJP ppx  - CMV ppx not currently indicated, D+/R+, CMV negative 3/18, repeat CMV monthly (due 4/23)     Positive DSA: AMR treatment deferred given frailty and stable PFTs.  DSA DQB2 decreased on 3/6 with 5667 mfi, and again decreased to 4960 on 3/23.  Most recent cell-free DNA (2/18) mildly elevated at 1.04 (concerning for possible rejection), which was increased from prior level of 0.12 (1/10).  IST increase deferred at that time per Dr. Gong.  S/p IVIG (2/27) for DSA (IgG WNL).  Immuknow as above.   Prospera (cell free DNA) 0.44 (3/18) suggests AMR less likely, follow  - Repeat DSA monthly (due 4/23)      EBV viremia: CT CAP (2/7) without lymphadenopathy.  Recent levels improving (3/18) 23k.  - Repeat EBV due 4/23     Other relevant problems being managed by the primary team:      FTT:  Severe protein calorie malnutrition:  Gastroparesis s/p PEG/J, botox, and G-POEM:  SB hypomotility:  Pyloric ulcer:  Chronic nausea and osmotic diarrhea:  SIBO s/p rifaximin:   Recurrent C diff colitis: Chronic osmotic and infectious diarrhea since transplant with recurrent episodes of C diff.  Notable weight loss (40# in a year) d/t diarrhea, GI dysmotility, and intolerance of enteral feeds (PEG/J tube in place), most recently on elemental formula.  Extensive OP eval and f/u with GI. S/p port placement for TPN and lipids. Continued for ~5 weeks inpatient without considerable improvement, transitioned back to TF.  C diff positive (3/13), on fidaxomicin.  Repeat (4/4) negative, enteric B/V panel also negative.  Loose stools persist, GI consulted 4/4.  Colonoscopy recommended by GI this admission, but pt. deferred d/t risk for reintubation and improvement in diarrhea today.  - Tube feeds per RD, consider cycling TF  - Strongly encourage TID supplemental shakes daily to meet PO nutritional goals     ESRD: CT with volume overload secondary to TPN volume, iHD increased from PTA TThSa schedule with unsustained improvement.  Management per Nephrology, dialysis via [facility] CVC.  Unable to remove fluid on 3/23 d/t hypotension, tolerance now improved with midodrine on HD days.  Volume removal and dialysis per nephrology.     Goals of care: Care conference held with palliative and primary team on 3/15 for medical update with pt. and daughters.  Repeat care conference 3/29 (see palliative and MICU notes) patient would like to proceed with re-intubation and trach if pt. fails extubation d/t progressive hypercapnia, understanding that this  would severely complicate potential disposition options.     We appreciate the excellent care provided by the [facility] team.  Recommendations communicated via [facility]rdPlace and this note.  Will continue to follow along closely, please do not hesitate to call with any questions or concerns.     Patient discussed with Dr. Refugio Grayson, APRN, CNP   Inpatient Nurse Practitioner  Pulmonary CF/Transplant     Subjective & Interval History:     Remains on RA during the day and home AVAPS overnight. Better tolerated today with new mask and straps/fitting from [place] yesterday.  No SOB or cough. Stools becoming thicker and less frequent.    Review of Systems:     C: No fever, no chills, no change in weight  INTEGUMENTARY/SKIN: No rash or obvious new lesions  ENT/MOUTH: No sore throat, no sinus pain, no nasal congestion or drainage  RESP: See interval history  CV: No chest pain, no palpitations, no peripheral edema, no orthopnea  GI: No nausea, no vomiting, no change in stools, no reflux symptoms  : No dysuria  MUSCULOSKELETAL: No myalgias, no arthralgias  ENDOCRINE: Blood sugars with adequate control  NEURO: No headache, no numbness or tingling  PSYCHIATRIC: Mood stable    Physical Exam:     All notes, images, and labs from past 24 hours (at minimum) were reviewed.    Vital signs:  Temp: 98.3  F (36.8  C) Temp src: Oral BP: 121/62 Pulse: 67   Resp: 19 SpO2: 100 % O2 Device: None (Room air) Oxygen Delivery: 4 LPM   Weight: 41 kg (90 lb 6.2 oz)  I/O:   Intake/Output Summary (Last 24 hours) at 4/11/2024 1344  Last data filed at 4/11/2024 0700  Gross per 24 hour   Intake 895 ml   Output --   Net 895 ml     Constitutional: sitting up in bed, in no apparent distress.   HEENT: Eyes with pink conjunctivae, anicteric.  Oral mucosa moist without lesions.   PULM: Diminished air flow bases bilaterally.  No crackles, no rhonchi, no wheezes.  Non-labored breathing on RA.   CV: Normal S1 and S2.  RRR.  + systolic  "murmur, gallop, or rub.  No peripheral edema.   ABD: NABS, soft, nontender, nondistended.   PEG/J tube site cdi.   MSK: Moves all extremities.   +muscle wasting.   NEURO: Alert and conversant.   SKIN: Warm, dry, fragile, scattered ecchymosis.    PSYCH: Mood stable.     Data:     LABS    CMP:   Recent Labs   Lab 04/11/24  0443 04/10/24  0621 04/09/24  0710 04/08/24  0438    137 141 137   POTASSIUM 3.9 3.8 4.8 5.3   CHLORIDE 90* 90* 97* 96*   CO2 30* 32* 26 26   ANIONGAP 16* 15 18* 15   * 112* 84 84   BUN 76.8* 50.2* 100.0* 80.3*   CR 5.36* 3.76* 6.59* 4.99*   GFRESTIMATED 9* 13* 7* 9*   ESTUARDO 9.0 8.9 9.1 9.6   MAG 1.9 1.8 2.3 2.2   PHOS 5.7* 4.6* 6.0* 6.1*   PROTTOTAL 5.6* 6.0* 5.8* 6.1*   ALBUMIN 3.5 3.7 3.5 3.8   BILITOTAL 0.3 0.2 0.2 0.2   ALKPHOS 133 113 118 106   AST 18 18 16 13   ALT 11 9 10 8     CBC:   Recent Labs   Lab 04/11/24  0443 04/10/24  0621 04/09/24  0710 04/08/24  0438   WBC 5.5 5.7 6.9 8.1   RBC 2.78* 3.00* 2.70* 3.05*   HGB 9.4* 10.3* 9.4* 10.6*   HCT 30.3* 33.3* 31.0* 34.4*   * 111* 115* 113*   MCH 33.8* 34.3* 34.8* 34.8*   MCHC 31.0* 30.9* 30.3* 30.8*   RDW 17.3* 17.9* 18.2* 17.8*    235 259 292       INR:   Recent Labs   Lab 04/11/24  0443 04/10/24  0621 04/09/24  0710 04/08/24  0438   INR 1.08 1.14 1.06 1.04       Glucose:   Recent Labs   Lab 04/11/24  0443 04/10/24  0621 04/09/24  0710 04/08/24  0438 04/07/24  0635 04/06/24  0432   * 112* 84 84 104* 114*       Blood Gas:   Recent Labs   Lab 04/11/24  0443 04/10/24  0621 04/09/24 2032   PHV 7.36 7.36 7.37   PCO2V 63* 67* 63*   PO2V 37 33 39   HCO3V 35* 38* 37*   LINDY 8.2* 10.1* 9.5*   O2PER 23 21 0       Culture Data No results for input(s): \"CULT\" in the last 168 hours.    Virology Data:   Lab Results   Component Value Date    FLUAH1 Not Detected 03/24/2024    FLUAH3 Not Detected 03/24/2024    SV7132 Not Detected 03/24/2024    IFLUB Not Detected 03/24/2024    RSVA Not Detected 03/24/2024    RSVB Not Detected " 03/24/2024    PIV1 Not Detected 03/24/2024    PIV2 Not Detected 03/24/2024    PIV3 Not Detected 03/24/2024    HMPV Not Detected 03/24/2024       Historical CMV results (last 3 of prior testing):  Lab Results   Component Value Date    CMVQNT Not Detected 03/18/2024    CMVQNT Not Detected 02/19/2024    CMVQNT Not Detected 02/07/2024     Lab Results   Component Value Date    CMVLOG 3.2 07/12/2023    CMVLOG <2.1 04/19/2023    CMVLOG 3.5 01/25/2023       Urine Studies    Recent Labs   Lab Test 02/18/24  0222 05/18/23  0627   URINEPH 7.5* 5.0   NITRITE Negative Negative   LEUKEST Trace* Moderate*   WBCU 66* 21*       Most Recent [place] Pulmonary Function Testing (FVC/FEV1 only)  FVC-Pre   Date Value Ref Range Status   02/07/2024 1.19 L    01/10/2024 1.12 L    08/29/2023 1.48 L    07/25/2023 1.55 L      FVC-%Pred-Pre   Date Value Ref Range Status   02/07/2024 42 %    01/10/2024 39 %    08/29/2023 53 %    07/25/2023 55 %      FEV1-Pre   Date Value Ref Range Status   02/07/2024 1.13 L    01/10/2024 1.10 L    08/29/2023 1.43 L    07/25/2023 1.54 L      FEV1-%Pred-Pre   Date Value Ref Range Status   02/07/2024 51 %    01/10/2024 49 %    08/29/2023 64 %    07/25/2023 69 %        IMAGING    No results found for this or any previous visit (from the past 48 hour(s)).

## 2024-04-12 ENCOUNTER — APPOINTMENT (OUTPATIENT)
Dept: PHYSICAL THERAPY | Facility: CLINIC | Age: 64
DRG: 640 | End: 2024-04-12
Attending: INTERNAL MEDICINE
Payer: MEDICARE

## 2024-04-12 ENCOUNTER — APPOINTMENT (OUTPATIENT)
Dept: OCCUPATIONAL THERAPY | Facility: CLINIC | Age: 64
DRG: 640 | End: 2024-04-12
Attending: INTERNAL MEDICINE
Payer: MEDICARE

## 2024-04-12 DIAGNOSIS — Z79.899 ENCOUNTER FOR LONG-TERM (CURRENT) USE OF HIGH-RISK MEDICATION: Primary | ICD-10-CM

## 2024-04-12 DIAGNOSIS — Z94.2 LUNG REPLACED BY TRANSPLANT (H): ICD-10-CM

## 2024-04-12 LAB
ANION GAP SERPL CALCULATED.3IONS-SCNC: 13 MMOL/L (ref 7–15)
BASE EXCESS BLDV CALC-SCNC: 9.8 MMOL/L (ref -3–3)
BUN SERPL-MCNC: 40.2 MG/DL (ref 8–23)
CALCIUM SERPL-MCNC: 8.9 MG/DL (ref 8.8–10.2)
CHLORIDE SERPL-SCNC: 94 MMOL/L (ref 98–107)
CREAT SERPL-MCNC: 3.21 MG/DL (ref 0.51–0.95)
CYCLOSPORINE BLD LC/MS/MS-MCNC: 163 UG/L (ref 50–400)
DEPRECATED HCO3 PLAS-SCNC: 31 MMOL/L (ref 22–29)
EGFRCR SERPLBLD CKD-EPI 2021: 16 ML/MIN/1.73M2
ERYTHROCYTE [DISTWIDTH] IN BLOOD BY AUTOMATED COUNT: 17.3 % (ref 10–15)
GLUCOSE SERPL-MCNC: 96 MG/DL (ref 70–99)
HCO3 BLDV-SCNC: 37 MMOL/L (ref 21–28)
HCT VFR BLD AUTO: 33.7 % (ref 35–47)
HGB BLD-MCNC: 10.6 G/DL (ref 11.7–15.7)
INR PPP: 1.09 (ref 0.85–1.15)
MAGNESIUM SERPL-MCNC: 1.7 MG/DL (ref 1.7–2.3)
MCH RBC QN AUTO: 34.9 PG (ref 26.5–33)
MCHC RBC AUTO-ENTMCNC: 31.5 G/DL (ref 31.5–36.5)
MCV RBC AUTO: 111 FL (ref 78–100)
O2/TOTAL GAS SETTING VFR VENT: 23 %
OXYHGB MFR BLDV: 65 % (ref 70–75)
PCO2 BLDV: 63 MM HG (ref 40–50)
PH BLDV: 7.38 [PH] (ref 7.32–7.43)
PHOSPHATE SERPL-MCNC: 3.8 MG/DL (ref 2.5–4.5)
PLATELET # BLD AUTO: 209 10E3/UL (ref 150–450)
PO2 BLDV: 37 MM HG (ref 25–47)
POTASSIUM SERPL-SCNC: 3.6 MMOL/L (ref 3.4–5.3)
RBC # BLD AUTO: 3.04 10E6/UL (ref 3.8–5.2)
SAO2 % BLDV: 66 % (ref 70–75)
SODIUM SERPL-SCNC: 138 MMOL/L (ref 135–145)
TME LAST DOSE: NORMAL H
TME LAST DOSE: NORMAL H
WBC # BLD AUTO: 5.3 10E3/UL (ref 4–11)

## 2024-04-12 PROCEDURE — 250N000013 HC RX MED GY IP 250 OP 250 PS 637

## 2024-04-12 PROCEDURE — 97110 THERAPEUTIC EXERCISES: CPT | Mod: GP

## 2024-04-12 PROCEDURE — 36415 COLL VENOUS BLD VENIPUNCTURE: CPT | Performed by: STUDENT IN AN ORGANIZED HEALTH CARE EDUCATION/TRAINING PROGRAM

## 2024-04-12 PROCEDURE — 80158 DRUG ASSAY CYCLOSPORINE: CPT | Performed by: NURSE PRACTITIONER

## 2024-04-12 PROCEDURE — 250N000012 HC RX MED GY IP 250 OP 636 PS 637

## 2024-04-12 PROCEDURE — 80048 BASIC METABOLIC PNL TOTAL CA: CPT

## 2024-04-12 PROCEDURE — 85610 PROTHROMBIN TIME: CPT | Performed by: STUDENT IN AN ORGANIZED HEALTH CARE EDUCATION/TRAINING PROGRAM

## 2024-04-12 PROCEDURE — 120N000003 HC R&B IMCU UMMC

## 2024-04-12 PROCEDURE — 250N000012 HC RX MED GY IP 250 OP 636 PS 637: Performed by: NURSE PRACTITIONER

## 2024-04-12 PROCEDURE — 85027 COMPLETE CBC AUTOMATED: CPT

## 2024-04-12 PROCEDURE — 999N000128 HC STATISTIC PERIPHERAL IV START W/O US GUIDANCE

## 2024-04-12 PROCEDURE — 97530 THERAPEUTIC ACTIVITIES: CPT | Mod: GP

## 2024-04-12 PROCEDURE — 97535 SELF CARE MNGMENT TRAINING: CPT | Mod: GO

## 2024-04-12 PROCEDURE — 82805 BLOOD GASES W/O2 SATURATION: CPT | Performed by: NURSE PRACTITIONER

## 2024-04-12 PROCEDURE — 99232 SBSQ HOSP IP/OBS MODERATE 35: CPT | Performed by: NURSE PRACTITIONER

## 2024-04-12 PROCEDURE — 83735 ASSAY OF MAGNESIUM: CPT | Performed by: STUDENT IN AN ORGANIZED HEALTH CARE EDUCATION/TRAINING PROGRAM

## 2024-04-12 PROCEDURE — 250N000013 HC RX MED GY IP 250 OP 250 PS 637: Performed by: STUDENT IN AN ORGANIZED HEALTH CARE EDUCATION/TRAINING PROGRAM

## 2024-04-12 PROCEDURE — 99232 SBSQ HOSP IP/OBS MODERATE 35: CPT | Mod: GC | Performed by: STUDENT IN AN ORGANIZED HEALTH CARE EDUCATION/TRAINING PROGRAM

## 2024-04-12 PROCEDURE — 84100 ASSAY OF PHOSPHORUS: CPT | Performed by: STUDENT IN AN ORGANIZED HEALTH CARE EDUCATION/TRAINING PROGRAM

## 2024-04-12 RX ADMIN — AMIODARONE HYDROCHLORIDE 200 MG: 200 TABLET ORAL at 08:47

## 2024-04-12 RX ADMIN — CLONIDINE HYDROCHLORIDE 0.1 MG: 0.1 TABLET ORAL at 21:35

## 2024-04-12 RX ADMIN — Medication 2.5 MCG: at 19:57

## 2024-04-12 RX ADMIN — APIXABAN 2.5 MG: 2.5 TABLET, FILM COATED ORAL at 08:47

## 2024-04-12 RX ADMIN — APIXABAN 2.5 MG: 2.5 TABLET, FILM COATED ORAL at 19:57

## 2024-04-12 RX ADMIN — SEVELAMER CARBONATE 0.8 G: 800 POWDER, FOR SUSPENSION ORAL at 17:06

## 2024-04-12 RX ADMIN — Medication 2.5 MCG: at 11:04

## 2024-04-12 RX ADMIN — CALCIUM CARBONATE 600 MG (1,500 MG)-VITAMIN D3 400 UNIT TABLET 1 TABLET: at 11:04

## 2024-04-12 RX ADMIN — METOPROLOL TARTRATE 25 MG: 25 TABLET, FILM COATED ORAL at 19:57

## 2024-04-12 RX ADMIN — LEVOTHYROXINE SODIUM 25 MCG: 0.03 TABLET ORAL at 08:47

## 2024-04-12 RX ADMIN — CYCLOSPORINE 125 MG: 25 CAPSULE, LIQUID FILLED ORAL at 17:06

## 2024-04-12 RX ADMIN — CYCLOSPORINE 125 MG: 25 CAPSULE, LIQUID FILLED ORAL at 08:54

## 2024-04-12 RX ADMIN — LIDOCAINE 4% 1 PATCH: 40 PATCH TOPICAL at 19:58

## 2024-04-12 RX ADMIN — LOPERAMIDE HCL 4 MG: 1 SOLUTION ORAL at 08:47

## 2024-04-12 RX ADMIN — Medication 40 MG: at 08:48

## 2024-04-12 RX ADMIN — CALCIUM CARBONATE 600 MG (1,500 MG)-VITAMIN D3 400 UNIT TABLET 1 TABLET: at 17:06

## 2024-04-12 RX ADMIN — SEVELAMER CARBONATE 0.8 G: 800 POWDER, FOR SUSPENSION ORAL at 08:47

## 2024-04-12 RX ADMIN — METOPROLOL TARTRATE 25 MG: 25 TABLET, FILM COATED ORAL at 08:47

## 2024-04-12 RX ADMIN — CALCIUM CARBONATE 600 MG (1,500 MG)-VITAMIN D3 400 UNIT TABLET 1 TABLET: at 08:47

## 2024-04-12 RX ADMIN — Medication 40 MG: at 19:57

## 2024-04-12 RX ADMIN — PREDNISONE 5 MG: 5 TABLET ORAL at 08:47

## 2024-04-12 RX ADMIN — OLANZAPINE 5 MG: 5 TABLET, ORALLY DISINTEGRATING ORAL at 21:35

## 2024-04-12 ASSESSMENT — ACTIVITIES OF DAILY LIVING (ADL)
ADLS_ACUITY_SCORE: 32
ADLS_ACUITY_SCORE: 32
ADLS_ACUITY_SCORE: 28
ADLS_ACUITY_SCORE: 32
ADLS_ACUITY_SCORE: 28
ADLS_ACUITY_SCORE: 32
ADLS_ACUITY_SCORE: 28
ADLS_ACUITY_SCORE: 32
ADLS_ACUITY_SCORE: 28

## 2024-04-12 NOTE — PROGRESS NOTES
[facility]    Progress Note - Medicine Service, NILE TEAM 3       Date of Admission:  2/10/2024    Assessment & Plan   Sofie Rodriguez is a 63 year old female with PMH COPD s/p bilateral lung transplant 6/28/22 c/b hemidiaphragm palsy and recurrent pneumonias, gastroparesis and small bowel dysmotility complicated by severe malnutrition now s/p PEG/J, ESRD on M/W/F HD, recent R femoral fx s/p ORIF, chronic diarrhea, recurrent c-diff, failure to thrive with inability to tolerate any tube feeds, who was admitted to [facility] on 2/10/24 for concerns over malnutrition and TPN initiation via portacath. Course complicated by recurrent and progressive acute on chronic hypoxic and hypercarbic respiratory failure requiring multiple ICU admissions and intubation 2/18-2/19, 2/28-2/29, 3/18-3/20, for continuous BiPAP. Again with worsening respiratory acidosis and hypercarbia, concern for infection vs acute rejection, requiring transfer back to ICU 3/20/2024 for intubation and bronchoscopy. Weaned off ventilator to RA and BIPAP at night. Transferred to medicine on 4/4/2024. Improved clinical status with adjustments to BiPAP, bicarb bath in HD, obtained a home AVAPs machine and have been ensuring stable settings prior to discharge.    Changes today:   - pCO2 stable today  - Cotinue daily VBGs to make sure hypercapnia is controlled, anticipate discharge on Monday  - Transplant pulm following for AVAPs adjustments  - Nutrition adjusting TF rate and length; ensuring pt has equipment/training for independent use at home      # Acute on chronic hypoxic and hypercarbic respiratory failure requiring intubation 2/17-2/19 3/25-4/2, improved  # Recurrent PNAs, concern for acute infection vs acute rejection  # S/p BSLT 6/8/22 for COPD  # R hemidiaphragm palsy   # Positive DSA   # Respiratory acidosis, improved with high bicarb bath with HD on 4/9/2024   Hx bilateral lung transplant  on 6/28/2022 for COPD.  Initially admitted on 2/10/2024 for initiation of TPN.  However she was admitted to the ICU on 2/17/2024 for hypoxic hypercarbic respiratory failure.  She was intubated on 2/18 - 2/19 due to pulmonary edema vs infection.  Was extubated onto BiPAP/AVAPS with improvement in mentation however continued to have respiratory acidosis.She was transferred to medicine floor on 2/29.  Was transferred back to the ICU on 3/18 - 3/20 due to hypercarbia and severe respiratory acidosis that did not require intubation.  Patient again experience worsening respiratory acidosis and hypercarbia will concern for infection VS acute rejection that required transfer back to ICU on 3/20/2024 for intubation and bronch.  She was weaned off the ventilator to room air and BiPAP at night.  Transferred to medicine on 4//2024.  She has had imaging to evaluate neurologic causes of decreased respiratory drive, without any notable findings.  Mentation remains stable despite low pH and increasing pCO2.  Volume status has improved with hemodialysis.  Overall her pCO2 retention is thought to be multifactorial, with a component of overfeeding through TPN, infection, bicarb loss through GI sources. Previously limited by intolerance d/t claustrophobia, but this has improved significantly.  - Daily VBG with adjustments to NIPPV   - Transplant pulm following, appreciate recs  - Immunosuppression:   > Prednisone 5 mg every morning, 2.5 mg every afternoon  > Cyclosporine 100mg BID (Stopped tacrolimus 3/10)   > Overnight oximetry study suggestive of O2 vs CPAP need, as did require up to 2LPM overnight to prevent hypoxia  > Minimize O2 to preserve respiratory drive, given IVIG for DSA+   > D/c'd theophylline 3/3/24 due to difficulty attaining therapeutic levels (started by ICU), could consider Modafinil   - Airway clearance/nebs:  --> resume BID scheduled if secretions worsen/thicken  - Xopenex neb BID PRN  - Mucomyst stopped 4/4   -  Volume removal with iHD   - Increased bicarb bath with dialysis on 4/9/2024  - NIPPV settings   - AVAPS at Tv 375 (425 prior), 12 minimum mmHg, max 25, and EPAP 5  - BIPAP/AVAPS at hs and naps at all times. Ideally 7-8 hours overnight, limited by claustrophobia, medications as below  - Home BIPAP delivered from home, patient had issues with leakage around mask overnight, troubleshooting continues  - Zyprexa 5mg at bedtime   - Atarax 25 mg TID PRN for anxiety  - Clonidine 0.1 mg at bedtime  - High risk for reintubation given hx of hypercarbia     L Ear pain and muffled hearing, improving  Rhinorrhea, resolved  Patient notes 3 days of L ear pain and mild rhinorrhea without cough or worsening SOB over the last 2-3 days. No systemic signs of new infection. No discharge from L ear on exam. Moderate ear wax burden. No bleeding or erythema. Low concern for acute otitis media. Could be a viral URI vs discomfort from using BIPAP/AVAPs more consistently leading to pressure imbalance across her tympanic membrane.   - CTM for now  - Daily fluticasone nasal sprays started on 4/7/2024     # Goals of Care  - 3/15 Care conference on with Transplant Pulm, Pall Care, Medicine, daughters (Julia Nash) and Transplant SW. Overall medical updates provided and Qs answered. With declining respiratory acidosis on labs, discussed code status 3/18. Patient initially not desiring any escalation of her care to ICU/intubation with frustration re overall course. However, after discussing with her daughter on the phone she and family elected to remain full code and agreed to ICU admission and intubation if necessary.   - 3/29 Care conference: Laid out a few options for family and patient to consider with qs answered. Examples being we could attempt to extubate to BiPAP but plan should be established prior to extubation that if patient decompensates and requires reintubation then likely pursue trach versus more comfort approach. Other option is  going straight for trach now. Patient and family (daughter Julia and son present on Ipad) considering the options and had a lot of questions about trach and what that would look like long-term, which was laid out for the family and patient.   - Pt made the decision this hospitalization, that if needed down the line she would be acceptable of a tracheostomy     # Chronic osmotic diarrhea/SIBO s/p Rifaximin  # Recurrent C.Diff (3rd ep 3/12/24)    # Diarrhea, improving  Recurrent C.diff 3/12, Fidaxomicin x 10 days (3/13-3/22), with improvement in diarrhea. Transplant pulm requesting repeat colonoscopy w/ Bx after completion of c.diff treatment, to r/o toxicity or other reason that diarrhea is present.  C.diff and enteric panel on 4/4/2024 was unremarkable.  Patient initially agreeable to colonoscopy, however later noted that she would like to defer colonoscopy for concerns regarding her weight and reintubation for the procedure. Diarrhea improving with PRN imodium.   - GI consult for recurrent diarrhea in the MICU, appreciate recs   - C.diff and enteric panel negative  - Confirmed Osmotic diarrhea with stool studies--> Likely not infectious, continue adjusting TF and loperamide PRN  - Bowel regimen PRN  - Imodium PRN     # Severe malnutrition   # Failure to thrive  # Hypoalbuminemia  # Gastroparesis, small bowel dysmotility  # S/P PEG/J with intolerance of enteral nutrition  Patient with gastroparesis (presumed due to vagal injury) and small bowel dysmotility complicated by unintentional 40lb weight loss over the past year and now severe malnutrition. Previously intolerant to oral food intake due to nausea. Was initially admitted for portacath and TPN initiation since 2/13. Intermittently tolerating feeds without n/v from 2/20.  Per GI, no ongoing concerns for SIBO as of 2/23. As of 3/11 transplant pulmonology is worried that TPN is not a realistic plan to continue at home and transitioned back to TF (RD oncerned pt  is not absorbing any oral intake). TPN discontinued 3/16. Transplant pulm also worried about mucosal toxicity.   - Metabolic cart 24 following 24 hours of calorie counts   - Nutrition consulted, appreciate assistance  - Continue TF at goal rate 35 ml/hr via PEG/J   > Working on consolidating as much as possible    > Ask if J feeds must be held for synthroid or not  - Speech saw pt 4/3 okay for regular diet with thin liquids; will continue pt tube feeds via pts g/j tube (hx of gastroparesis requiring TPN) until pt able to take in adequate PO nutrition          - PEGJ dislodged on 2024 and replaced with IR on 2024    # Recurrent pneumonia, concern for acute infection vs rejection  # Recurrent C.Diff colitis (3rd ep 3/12/24)  # Hx EBV viremia   # Hypogammaglobulinemia  # Chronic immunosuppression  lung transplant  Empirically treated for pneumonia  - , RVP and cultures no growth to date. Recurrent C.Diff Positive 3/12, s/p PO Fidaxomicin 200 mg BID for 10 days (3/13-3/22) with improvement in diarrhea. Remains afebrile, leukocytosis resolved.  Ig, s/p IVIG.  EBV 27K (decreased compared to prior).     - Follow up BAL and blood cultures from 3/24, NGTD     Antibiotics:  Zosyn ( - , 3/24 - )   Bactrim (MWF, PJP ppx, previously on Dapsone)  Vancomycin ( - , 3/24-3/25)  Micafungin (- , 3/24 x1)  Fidaxomicin (3/13-3/22)     Micro:   - BAL 3/24:   - Cell count w diff: PMN 75%, lymphocytes 5, monocytes 19, eos 1  - No organisms seen on Gram stain  - RVP, HSV, Histoplasma neg  - Fungal & bacterial cultures NGTD  - EBV, CMV, PJP, Aspergillus, Legionella, Mycoplasma, AFB in process  - Bcx 3/24 NGTD    Chronic issues    # A-flutter (recurrent on 3/17)  # A-fib, intermittent  # HTN  # HFpEF  Noted Afib/flutter intermittently throughout admission. Was started on amiodarone bolus with drip and transitioned to PO dosing.   TTE: LVEF 55-60%, global RV function normal,  no significant valvular abnormalities. Briefly required levophed and midodrine for HD but otherwise stable off pressors.   - MAP goal > 65 mmHg   - Continue Metoprolol tartrate dose 25 mg BID (hold parameters if MAP<65 or hr less than 60, hold on dialysis days)  - Continue amiodarone 200 mg PO  - Midodrine with HD as needed  - Restarted Apixaban now that patient not wanting colonoscopy     # Hypothyroidism  Patient with new (2/17/24) low T4 at 0.64 and TSH at 6.5, concerning for new hypothyroidism vs sick thyroid syndrome. TSH with appropriate response, more consistent with elevation in the setting of acute illness. ICU team on 2/28 recommended initiation of treatment for possible hypothyroid as this can improve respiratory muscle function in the setting of weakness and malnutrition. 3/7, 3/15, 3/20 repeat thyroid function WNL.  - Continue liothyronine + levothyroxine -- note that prolonged combination therapy is not optimal & regimen should be re-evaluated (likely stop liothyronine, up-titrate levothyroxine) in post-acute setting    # Hx of Anxiety   # Claustrophobia  Reports claustrophobia contributing to limited compliance with BiPAP. Has seen health psych on 3/20, recommended grounding exercise (5-4-3-2-1) for use on BiPAP.   - Zyprexa 5mg at bedtime   - Atarax 25 mg TID PRN for anxiety  - Clonidine 0.1 mg at bedtime    # ESRD on HD TTS   # Mild hyperphosphatemia  Patient is ESRD on T/Th/Sat HD as outpt, now approx M/W/F inpatient. HD tolerating until 3/23. Briefly required extra Monday runs, not since being off TPN. She would prefer longer sessions to more days.  - Midodrine 10mg BID PRN with dialysis days   - Discuss with nephrology if there are additional strategies to optimize bicarb   - Sevelamer per Nephrology   - CBC and CMP daily  - Strict I/Os  - Daily weight   - Renally adjust medications     #Acute on chronic anemia 2/2 ESRD  Hgb stable, with no acute signs of bleeding.   - Daily CBC  - Transfuse for  Hgb < 7  - Continue Epogen with dialysis, held in setting of concern for acute infection   - Continue Venofer 50 mcg qweek with dialysis, held in setting of concern for acute infection    # Suspected CARLEE  # PTA nocturnal O2, 2L   - Sleep clinic eval at discharge for suspected CARLEE    # GERD  - PPI BID    # Hyperglycemia, stress induced  Has been euglycemic for the past few days. Not on insulin.  - Hypoglycemia protocol     Diet: Renal Diet (dialysis)  Adult Formula Drip Feeding: Specified Time Eneida Stoll Renal Support; Jejunostomy; Goal Rate: 40 mL/hr x 18 hours daily (6105-7817); mL/hr; From: 1:00 PM; To: 7:00 AM  Snacks/Supplements Adult: Nepro Oral Supplement; With Meals    DVT Prophylaxis: DOAC  Dong Catheter: Not present  Fluids: None  Lines: None       Cardiac Monitoring: None  Code Status: Full Code      Clinically Significant Risk Factors              # Hypoalbuminemia: Lowest albumin = 2.6 g/dL at 2/18/2024  5:13 AM, will monitor as appropriate               # Severe Malnutrition: based on nutrition assessment    # Financial/Environmental Concerns: none         Disposition Plan   Pending medical stability and home BIPAP/AVAP coordination     The patient's care was discussed with the Attending Physician, Dr. Pantoja .    Lachelle Skinner MD  Medicine Service, [facility] TEAM [address] [place] [address]  Securely message with [employer] (more info)  Text page via [employer] Paging/Directory   See signed in provider for up to date coverage information  ______________________________________________________________________    Interval History   NAEON. pCO2 stable today.  Sofie reports her BiPAP felt better last night and was not blowing air into her face as forcefully as prior.  She felt she did well with that and had no concerns about the fit.  Feels she will be ready to go home on Monday.  Noted that her feeding tube pump at home had some issues where it was resetting every hour requiring to  her to restart it every hour and beeping at her if she did not, she hopes to be received to get this figured out before she goes.    Physical Exam   Vital Signs: Temp: 98.6  F (37  C) Temp src: Oral BP: 102/66 Pulse: 66   Resp: 16 SpO2: 100 % O2 Device: None (Room air)    Weight: 87 lbs 4.83 oz    General: Awake, alert & oriented. Frail appearing. Sitting in bed reading book  HEENT: Mucous membranes moist  Neuro: Awake and alert, no focal deficits, moving all extremities to command and spontaneously  Pulm/Resp: Clear breath sounds bilaterally, diminished on R>L, no accessory muscle use  CV: RRR, S1/S2 without m/r/g; pedal pulses 2+ without LE edema  Abdomen: Soft, non-distended, non-tender, in the morning 18 Fr in place with dressings on top without leakage or drainage.  : Rectal tube in place, (+) bruit/thrill in LUE fistula  Incisions/Skin: PICC and PEG site without surrounding erythema, no rashes noted    Medical Decision Making       Please see A&P for additional details of medical decision making.      Data   ------------------------- PAST 24 HR DATA REVIEWED -----------------------------------------------    I have personally reviewed the following data over the past 24 hrs:    5.3  \   10.6 (L)   / 209     138 94 (L) 40.2 (H) /  96   3.6 31 (H) 3.21 (H) \     INR:  1.09 PTT:  N/A   D-dimer:  N/A Fibrinogen:  N/A       Imaging results reviewed over the past 24 hrs:   No results found for this or any previous visit (from the past 24 hour(s)).

## 2024-04-12 NOTE — PLAN OF CARE
[id number]    Neuro: A&Ox4.   Cardiac: HR 60-70s. BP 90-110s/50-60s. Afebrile.   Respiratory: Sating > 88% on RA. BiPap overnight and when napping.   GI/: Anuric - on HD. No BM this shift.  Diet/appetite: Tolerating renal diet. Cycled TF @ 40 mL/hr w/ Q4 30 mL FWF, 3756-8362.  Activity:  Independent. Up in room and to bedside commode this shift.  Pain: Denied pain this shift.  Skin: No new deficits noted.  LDA's: R PIV - saline locked. PEG/J: G - clamped, J - enteral feeds.    Potential discharge Monday, 4/15.    Plan: Continue with POC. Notify primary team with changes.

## 2024-04-12 NOTE — PROGRESS NOTES
"Care Management Follow Up    Length of Stay (days): 62    Expected Discharge Date: 04/12/2024     Concerns to be Addressed: Discharge planning, medical readiness.   Patient plan of care discussed at interdisciplinary rounds: Yes    Anticipated Discharge Disposition: Home  Anticipated Discharge Services: Home infusion, OP HD  Anticipated Discharge DME: Bipap    Patient/family educated on Medicare website which has current facility and service quality ratings:  NA  Education Provided on the Discharge Plan: Yes  Patient/Family in Agreement with the Plan: Yes    Referrals Placed by CM/SW: Home infusion, DME  Private pay costs discussed: Not applicable    Additional Information:  Writer confirmed w/the primary team, they will keep the patient over the weekend w/potential discharge planned for Monday, noting that pulm would like to continue monitoring pt. Writer updated [facility] and pt's OP HD clinic.     Writer met w/patient to discuss discharge planning. Patient is hopeful to discharge Monday, notes that she was going to work on setting up her OP HD rides and potential discharge ride on Monday. Patient voiced that her home enteral pump has been beeping frequently, she feels like she programmed it incorrectly and was hoping someone could help her troubleshoot, message sent to I liaison at this time. Of note, pt's pump is at home in [place] and there is no one who can bring it down to the hospital. I liaison noted that they suspect the patient programmed a \"feed interval\" when she shouldn't and they can show her what to push to correct it, patient updated.     Additional call out to OP pulm rehab at [facility], additional VM left requesting call back w/fax number.     Discharge resources:     [facility] (home enteral feedings)  Phone: [phone]  Fax: [phone]      [facility]   Chair time 5am T/TH/Sat  Phone: [phone]  Fax: [phone]     [facility] " [facility] (patient will arrange rides once discharge date is known)  Phone: [phone]     [facility] (home O2 and bipap)  Phone: [phone]  Fax: [phone]   Rep, [facility]: [phone]     [facility] (OP pulm rehab)  Phone: [phone], ext. [phone].  Fax: Number needed    Care coordination will continue to follow.     Lacey Pringle, RNCC, BSN    [facility]    [address] [address]    [email or web address]    Office: [phone] Pager: [phone]

## 2024-04-12 NOTE — PLAN OF CARE
Neuro: Patient alert and oriented x4.    Cardiac: HR 60-80s. -110s/60s. Afebrile.        Respiratory: Sating >88% on RA. Using home AVAPS overnight. Baseline C02 in 60's.   GI/: Anuric, on dialysis. Loose stools, but no BM today.  Diet/appetite: Renal diet, appetite improving. TF via J tube @ 40 mL/hr w/ Q4 30 mL FWF from 5170-6551.  Activity: Independent, up ad magalie. Steady on feet.   Pain: Has denies pain throughout the day.  Skin: No new deficits noted.  LDA's: R PIV - SL. L HD fistula.     Plan: Plans for dialysis tomorrow. Most likely will discharge on Monday. Continue with POC. Notify primary team with changes.

## 2024-04-12 NOTE — PROGRESS NOTES
Pulmonary Medicine  Cystic Fibrosis - Lung Transplant Team  Progress Note  2024     Patient: Sofie Rodriguez  MRN: [id number]  : 1960 (age 63 year old)  Transplant: 2022 (Lung), POD#[id number]  Admission date: 2/10/2024    Assessment & Plan:     Sofie Rodriguez is a 63 year old female with h/o COPD s/p BSLT () with course complicated by post-operative hemidiaphragm palsy, recurrent PNAs, positive DSA, EBV viremia, hypogammaglobulinemia, severe gastroparesis s/p G/J tube placement (22) and pyloric botox (23), GI bleed /2 pyloric ulcer, hemobilia s/p ERCP and MRCP, chronic diarrhea, recurrent C diff colitis, ESRD on iHD, recurrent falls with injuries (recent right hip fx s/p ORIF 2023), deconditioning, and FTT.  Admitted 2/10 for failure to thrive and initiation of TPN/lipids.  Emergent intubation - for hypoxic and hypercapneic respiratory failure and encephalopathy. Returned to ICU - and again 3/18-3/20 d/t tenuous respiratory status complicated by variable daytime NIPPV tolerance and hypervolemia with iHD limited d/t hypotension. Again transferred back to ICU 3/24 for intubation and bronch/BAL given hypoxic and hypercapneic respiratory failure. CT with extensive bilateral infiltrate and etiology likely multifactorial including volume overload and infection, possible mucous plugging, ACR or AMR less likely.  Extubated , no daytime hypoxia and hypercapnia remains stable with good adherence to NIPPV with sleep.  Tolerating PO, diarrhea more stable.  Progress acidosis and hypercapnia 4/3- despite excellent adherence to overnight NIPPV and adjustments in settings, eventually improved and stabilized with bicarbonate bath adjustment per nephrology, still without daytime hypoxia.  Discharge to home planned for 4/15 pending continued tolerance to home NIPPV device.       Today's recommendations:  - Daily morning VBG to continue  - Overnight and with nap: consistent use of  AVAPS (using home long-term device while inpatient since 4/9) with  ml and max pressure 25  - CSA level today presumed therapeutic, no dose change, repeat level ordered 4/15  - CMV, DSA, and EBV monthly due 4/23  - Supplemental shakes TID-QID to meet PO nutritional goals     Acute on chronic hypoxic/hypercapneic respiratory failure:  S/p bilateral sequential lung transplant for COPD:   Hypoventilation, Suspected CARLEE:  PFTs in clinic remained significantly below her baseline.  Baseline hypoxia with 2L NC overnight PTA.  S/p intubation 2/17-2/19 for acute hypoxic/hypercapneic respiratory failure with encephalopathy, CT with concern for infection and pulmonary edema given recent addition of TPN nutrition.  Bronch (2/18) with very friable tissue, cutlures only with C. glabrata.  Persistent respiratory failure also complicated by hypoventilation and deconditioning d/t malnutrition. Neurology consulted 2/27, MRI brain (3/1) without acute intracranial pathology.  SNIFF (3/8) normal.  Metabolic CART suggested overfeeding on TPN.  Returned to ICU (3/18-3/20) d/t tenuous respiratory status complicated by NIPPV intolerance and hypervolemia with iHD limited d/t hypotension (CXR with increased pulmonary edema).  Overall, her PCO2 retention is likely multifactorial with a component being from overfeeding (though remedied with nutrition adjustment), metabolic compensation barrier d/t renal failure, pulmonary edema, bicarb loss with diarrhea, hypoventilation with declining NIF and FVC concerning for neuromuscular etiology (though Neurology consult was not revealing), and NIPPV intolerance due to claustrophobia. Worsening hypoxic and hypercapneic respiratory failure 3/24 and transferred to ICU for intubation. CT chest with extensive bilateral infiltrates markedly increased from previous.  Bronch with small L>R sided secretions easily suctioned, BAL with no evidence of DAH, non bloody.  S/p Zosyn (3/23-4/2) for 10 day empiric  course.  Extubated 4/2, hypercapnia stable with consistent overnight BiPAP use.  CXR (4/8) with stable bibasilar opacities and small left pleural effusion.  Progressive hypercapnia and acidosis despite transition from BiPAP to AVAPS (due to decreasing TV) and gradual increase in AVAPS TV, pt with excellent adherence to use overnight and with naps. Improved with bicarbonate bath adjustment per nephrology.  - Daily morning VBG to continue  - Overnight and with nap: consistent use of AVAPS (using home long-term device while inpatient since 4/9) with  ml and max pressure 25  - Pulmonary f/u as OP ~2 weeks after discharge     Immunosuppression:  AZA previously stopped 5/2023 d/t low ImmuKnow assay and EBV viremia.  ImmuKnow (2/27) remained low at 88.  ImmuKnow (4/1) further decreased to 43.  Transitioned from [place] to CSA 3/11.  -  mg BID (increased 4/9 and transitioned to PO).  Goal 120-140 (decreased 4/4 for ImmuKnow).  Level today 163 (10h), presumed therapeutic.  Repeat level 4/15 (ordered).  - Prednisone 5 mg daily     Prophylaxis:   - Bactrim qMWF for PJP ppx  - CMV ppx not currently indicated, D+/R+, CMV negative 3/18, repeat CMV monthly (due 4/23)     Positive DSA: AMR treatment deferred given frailty and stable PFTs.  DSA DQB2 decreased on 3/6 with 5667 mfi, and again decreased to 4960 on 3/23.  Most recent cell-free DNA (2/18) mildly elevated at 1.04 (concerning for possible rejection), which was increased from prior level of 0.12 (1/10).  IST increase deferred at that time per Dr. Gong.  S/p IVIG (2/27) for DSA (IgG WNL).  Immuknow as above.  Prospera (cell free DNA) 0.44 (3/18) suggests AMR less likely, follow  - Repeat DSA monthly (due 4/23)      EBV viremia: CT CAP (2/7) without lymphadenopathy.  Recent levels improving (3/18) 23k.  - Repeat EBV due 4/23     Other relevant problems being managed by the primary team:      FTT:  Severe protein calorie malnutrition:  Gastroparesis s/p PEG/J,  botox, and G-POEM:  SB hypomotility:  Pyloric ulcer:  Chronic nausea and osmotic diarrhea:  SIBO s/p rifaximin:   Recurrent C diff colitis: Chronic osmotic and infectious diarrhea since transplant with recurrent episodes of C diff.  Notable weight loss (40# in a year) d/t diarrhea, GI dysmotility, and intolerance of enteral feeds (PEG/J tube in place), most recently on elemental formula.  Extensive OP eval and f/u with GI. S/p port placement for TPN and lipids. Continued for ~5 weeks inpatient without considerable improvement, transitioned back to TF.  C diff positive (3/13), on fidaxomicin.  Repeat (4/4) negative, enteric B/V panel also negative.  Loose stools persist, GI consulted 4/4.  Colonoscopy recommended by GI this admission, but pt. deferred d/t risk for reintubation and improvement in diarrhea today.  - Tube feeds per RD  - Supplemental shakes TID-QID to meet PO nutritional goals     ESRD: CT with volume overload secondary to TPN volume, iHD increased from PTA TThSa schedule with unsustained improvement.  Management per Nephrology, dialysis via [facility] C[facility].  Unable to remove fluid on 3/23 d/t hypotension, tolerance now improved with midodrine on HD days.  Volume removal and dialysis per nephrology.     Goals of care: Care conference held with palliative and primary team on 3/15 for medical update with pt. and daughters.  Repeat care conference 3/29 (see palliative and MICU notes) patient would like to proceed with re-intubation and trach if pt. fails extubation d/t progressive hypercapnia, understanding that this would severely complicate potential disposition options.     We appreciate the excellent care provided by the [facility] team.  Recommendations communicated via this note.  Will continue to follow along closely, please do not hesitate to call with any questions or concerns.     Patient discussed with Dr. Refugio Johnson, DNP, APRN, CNP  Inpatient Nurse Practitioner  Pulmonary  CF/Transplant     Subjective & Interval History:     Remains on RA during the day and AVAPS with home device overnight, tolerating well.  HD yesterday for 2.5L removed.  Working on PO, not getting enough shakes during the day (as ordered), stools stable.    Review of Systems:     C: No fever, no chills, no change in weight  INTEGUMENTARY/SKIN: No rash or obvious new lesions  ENT/MOUTH: No nasal congestion or drainage  RESP: See interval history  CV: No chest pain, no palpitations, no peripheral edema, no orthopnea  GI: No nausea, no vomiting, no reflux symptoms  : Oliguria  MUSCULOSKELETAL: No myalgias, no arthralgias  ENDOCRINE: Blood sugars with adequate control  NEURO: No headache, no numbness or tingling  PSYCHIATRIC: Mood stable    Physical Exam:     All notes, images, and labs from past 24 hours (at minimum) were reviewed.    Vital signs:  Temp: 98.7  F (37.1  C) Temp src: Axillary BP: 96/60 Pulse: 68   Resp: 20 SpO2: 99 % O2 Device: BiPAP/CPAP     Weight: 39.6 kg (87 lb 4.8 oz)  I/O:   Intake/Output Summary (Last 24 hours) at 4/12/2024 0737  Last data filed at 4/12/2024 0500  Gross per 24 hour   Intake 1010 ml   Output 2500 ml   Net -1490 ml     Constitutional: Sitting up in a chair, thin appearing, in no apparent distress.   HEENT: Eyes with pink conjunctivae, anicteric.  Oral mucosa moist without lesions.   PULM: Diminished bases bilaterally.  No crackles, no rhonchi, no wheezes.  Non-labored breathing on RA.  CV: Normal S1 and S2.  RRR.  + faint murmur.  No gallop or rub.  No peripheral edema.   ABD: NABS, soft, nontender, nondistended.  PEG/J tube site cdi.  MSK: Moves all extremities.  + muscle wasting.   NEURO: Alert and conversant.   SKIN: Warm, dry, fragile, scattered ecchymosis.   PSYCH: Mood stable.     Data:     LABS    CMP:   Recent Labs   Lab 04/12/24  0447 04/11/24  1622 04/11/24  0443 04/10/24  0621 04/09/24  0710 04/08/24  0438     --  136 137 141 137   POTASSIUM 3.6  --  3.9 3.8 4.8  "5.3   CHLORIDE 94*  --  90* 90* 97* 96*   CO2 31*  --  30* 32* 26 26   ANIONGAP 13  --  16* 15 18* 15   GLC 96 163* 107* 112* 84 84   BUN 40.2*  --  76.8* 50.2* 100.0* 80.3*   CR 3.21*  --  5.36* 3.76* 6.59* 4.99*   GFRESTIMATED 16*  --  9* 13* 7* 9*   ESTUARDO 8.9  --  9.0 8.9 9.1 9.6   MAG 1.7  --  1.9 1.8 2.3 2.2   PHOS 3.8  --  5.7* 4.6* 6.0* 6.1*   PROTTOTAL  --   --  5.6* 6.0* 5.8* 6.1*   ALBUMIN  --   --  3.5 3.7 3.5 3.8   BILITOTAL  --   --  0.3 0.2 0.2 0.2   ALKPHOS  --   --  133 113 118 106   AST  --   --  18 18 16 13   ALT  --   --  11 9 10 8     CBC:   Recent Labs   Lab 04/12/24  0447 04/11/24  0443 04/10/24  0621 04/09/24  0710   WBC 5.3 5.5 5.7 6.9   RBC 3.04* 2.78* 3.00* 2.70*   HGB 10.6* 9.4* 10.3* 9.4*   HCT 33.7* 30.3* 33.3* 31.0*   * 109* 111* 115*   MCH 34.9* 33.8* 34.3* 34.8*   MCHC 31.5 31.0* 30.9* 30.3*   RDW 17.3* 17.3* 17.9* 18.2*    214 235 259       INR:   Recent Labs   Lab 04/12/24  0447 04/11/24  0443 04/10/24  0621 04/09/24  0710   INR 1.09 1.08 1.14 1.06       Glucose:   Recent Labs   Lab 04/12/24 0447 04/11/24  1622 04/11/24  0443 04/10/24  0621 04/09/24  0710 04/08/24  0438   GLC 96 163* 107* 112* 84 84       Blood Gas:   Recent Labs   Lab 04/12/24  0447 04/11/24  0443 04/10/24  0621   PHV 7.38 7.36 7.36   PCO2V 63* 63* 67*   PO2V 37 37 33   HCO3V 37* 35* 38*   LINDY 9.8* 8.2* 10.1*   O2PER 23 23 21       Culture Data No results for input(s): \"CULT\" in the last 168 hours.    Virology Data:   Lab Results   Component Value Date    FLUAH1 Not Detected 03/24/2024    FLUAH3 Not Detected 03/24/2024    XN3216 Not Detected 03/24/2024    IFLUB Not Detected 03/24/2024    RSVA Not Detected 03/24/2024    RSVB Not Detected 03/24/2024    PIV1 Not Detected 03/24/2024    PIV2 Not Detected 03/24/2024    PIV3 Not Detected 03/24/2024    HMPV Not Detected 03/24/2024       Historical CMV results (last 3 of prior testing):  Lab Results   Component Value Date    CMVQNT Not Detected 03/18/2024    " CMVQNT Not Detected 02/19/2024    CMVQNT Not Detected 02/07/2024     Lab Results   Component Value Date    CMVLOG 3.2 07/12/2023    CMVLOG <2.1 04/19/2023    CMVLOG 3.5 01/25/2023       Urine Studies    Recent Labs   Lab Test 02/18/24  0222 05/18/23  0627   URINEPH 7.5* 5.0   NITRITE Negative Negative   LEUKEST Trace* Moderate*   WBCU 66* 21*       Most Recent [place]reeze Pulmonary Function Testing (FVC/FEV1 only)  FVC-Pre   Date Value Ref Range Status   02/07/2024 1.19 L    01/10/2024 1.12 L    08/29/2023 1.48 L    07/25/2023 1.55 L      FVC-%Pred-Pre   Date Value Ref Range Status   02/07/2024 42 %    01/10/2024 39 %    08/29/2023 53 %    07/25/2023 55 %      FEV1-Pre   Date Value Ref Range Status   02/07/2024 1.13 L    01/10/2024 1.10 L    08/29/2023 1.43 L    07/25/2023 1.54 L      FEV1-%Pred-Pre   Date Value Ref Range Status   02/07/2024 51 %    01/10/2024 49 %    08/29/2023 64 %    07/25/2023 69 %        IMAGING    No results found for this or any previous visit (from the past 48 hour(s)).

## 2024-04-12 NOTE — PLAN OF CARE
Neuro: A&Ox4.   Cardiac: No tele, VSS.   Respiratory: Sating >90% on RA.  GI/: Aneuric. BM X1  Diet/appetite: Tolerating regular diet. TF infusing @ 40mL/hr- started at 1300.   Activity:  IND, up to chair and in halls.  Pain: At acceptable level on current regimen.   Skin: No new deficits noted.  LDA's: PIV- SL    Plan: Continue with POC. Notify primary team with changes.

## 2024-04-13 LAB
ANION GAP SERPL CALCULATED.3IONS-SCNC: 16 MMOL/L (ref 7–15)
BASE EXCESS BLDV CALC-SCNC: 4.7 MMOL/L (ref -3–3)
BUN SERPL-MCNC: 81.6 MG/DL (ref 8–23)
CALCIUM SERPL-MCNC: 8.9 MG/DL (ref 8.8–10.2)
CHLORIDE SERPL-SCNC: 93 MMOL/L (ref 98–107)
CREAT SERPL-MCNC: 5.25 MG/DL (ref 0.51–0.95)
DEPRECATED HCO3 PLAS-SCNC: 29 MMOL/L (ref 22–29)
EGFRCR SERPLBLD CKD-EPI 2021: 9 ML/MIN/1.73M2
GLUCOSE SERPL-MCNC: 181 MG/DL (ref 70–99)
HCO3 BLDV-SCNC: 33 MMOL/L (ref 21–28)
INR PPP: 1.1 (ref 0.85–1.15)
MAGNESIUM SERPL-MCNC: 1.9 MG/DL (ref 1.7–2.3)
O2/TOTAL GAS SETTING VFR VENT: 2 %
OXYHGB MFR BLDV: 93 % (ref 70–75)
PCO2 BLDV: 68 MM HG (ref 40–50)
PH BLDV: 7.29 [PH] (ref 7.32–7.43)
PHOSPHATE SERPL-MCNC: 5.6 MG/DL (ref 2.5–4.5)
PO2 BLDV: 83 MM HG (ref 25–47)
POTASSIUM SERPL-SCNC: 3.6 MMOL/L (ref 3.4–5.3)
SAO2 % BLDV: 94.8 % (ref 70–75)
SODIUM SERPL-SCNC: 138 MMOL/L (ref 135–145)

## 2024-04-13 PROCEDURE — 85610 PROTHROMBIN TIME: CPT | Performed by: STUDENT IN AN ORGANIZED HEALTH CARE EDUCATION/TRAINING PROGRAM

## 2024-04-13 PROCEDURE — 250N000009 HC RX 250

## 2024-04-13 PROCEDURE — 250N000013 HC RX MED GY IP 250 OP 250 PS 637: Performed by: STUDENT IN AN ORGANIZED HEALTH CARE EDUCATION/TRAINING PROGRAM

## 2024-04-13 PROCEDURE — 250N000013 HC RX MED GY IP 250 OP 250 PS 637

## 2024-04-13 PROCEDURE — 634N000001 HC RX 634: Mod: JZ | Performed by: STUDENT IN AN ORGANIZED HEALTH CARE EDUCATION/TRAINING PROGRAM

## 2024-04-13 PROCEDURE — 99233 SBSQ HOSP IP/OBS HIGH 50: CPT | Mod: GC | Performed by: STUDENT IN AN ORGANIZED HEALTH CARE EDUCATION/TRAINING PROGRAM

## 2024-04-13 PROCEDURE — 82805 BLOOD GASES W/O2 SATURATION: CPT | Performed by: NURSE PRACTITIONER

## 2024-04-13 PROCEDURE — 80048 BASIC METABOLIC PNL TOTAL CA: CPT

## 2024-04-13 PROCEDURE — 83735 ASSAY OF MAGNESIUM: CPT | Performed by: STUDENT IN AN ORGANIZED HEALTH CARE EDUCATION/TRAINING PROGRAM

## 2024-04-13 PROCEDURE — 90935 HEMODIALYSIS ONE EVALUATION: CPT

## 2024-04-13 PROCEDURE — 36415 COLL VENOUS BLD VENIPUNCTURE: CPT | Performed by: NURSE PRACTITIONER

## 2024-04-13 PROCEDURE — 250N000013 HC RX MED GY IP 250 OP 250 PS 637: Performed by: INTERNAL MEDICINE

## 2024-04-13 PROCEDURE — 258N000003 HC RX IP 258 OP 636: Performed by: STUDENT IN AN ORGANIZED HEALTH CARE EDUCATION/TRAINING PROGRAM

## 2024-04-13 PROCEDURE — 90935 HEMODIALYSIS ONE EVALUATION: CPT | Performed by: STUDENT IN AN ORGANIZED HEALTH CARE EDUCATION/TRAINING PROGRAM

## 2024-04-13 PROCEDURE — 999N000157 HC STATISTIC RCP TIME EA 10 MIN

## 2024-04-13 PROCEDURE — 120N000003 HC R&B IMCU UMMC

## 2024-04-13 PROCEDURE — 250N000012 HC RX MED GY IP 250 OP 636 PS 637: Performed by: NURSE PRACTITIONER

## 2024-04-13 PROCEDURE — 250N000012 HC RX MED GY IP 250 OP 636 PS 637

## 2024-04-13 PROCEDURE — 84100 ASSAY OF PHOSPHORUS: CPT | Performed by: STUDENT IN AN ORGANIZED HEALTH CARE EDUCATION/TRAINING PROGRAM

## 2024-04-13 RX ADMIN — METOPROLOL TARTRATE 25 MG: 25 TABLET, FILM COATED ORAL at 11:51

## 2024-04-13 RX ADMIN — EPOETIN ALFA-EPBX 6000 UNITS: 10000 INJECTION, SOLUTION INTRAVENOUS; SUBCUTANEOUS at 10:05

## 2024-04-13 RX ADMIN — Medication 2.5 MCG: at 11:51

## 2024-04-13 RX ADMIN — SULFAMETHOXAZOLE AND TRIMETHOPRIM 1 TABLET: 400; 80 TABLET ORAL at 20:07

## 2024-04-13 RX ADMIN — Medication 40 MG: at 19:47

## 2024-04-13 RX ADMIN — PREDNISONE 5 MG: 5 TABLET ORAL at 07:05

## 2024-04-13 RX ADMIN — LIDOCAINE 4% 1 PATCH: 40 PATCH TOPICAL at 19:47

## 2024-04-13 RX ADMIN — SEVELAMER CARBONATE 0.8 G: 800 POWDER, FOR SUSPENSION ORAL at 17:06

## 2024-04-13 RX ADMIN — MIDODRINE HYDROCHLORIDE 10 MG: 5 TABLET ORAL at 07:18

## 2024-04-13 RX ADMIN — OLANZAPINE 5 MG: 5 TABLET, ORALLY DISINTEGRATING ORAL at 21:15

## 2024-04-13 RX ADMIN — MIDODRINE HYDROCHLORIDE 10 MG: 5 TABLET ORAL at 23:06

## 2024-04-13 RX ADMIN — SODIUM CHLORIDE 250 ML: 9 INJECTION, SOLUTION INTRAVENOUS at 07:43

## 2024-04-13 RX ADMIN — CALCIUM CARBONATE 600 MG (1,500 MG)-VITAMIN D3 400 UNIT TABLET 1 TABLET: at 17:06

## 2024-04-13 RX ADMIN — CYCLOSPORINE 125 MG: 25 CAPSULE, LIQUID FILLED ORAL at 17:06

## 2024-04-13 RX ADMIN — Medication: at 07:43

## 2024-04-13 RX ADMIN — AMIODARONE HYDROCHLORIDE 200 MG: 200 TABLET ORAL at 07:04

## 2024-04-13 RX ADMIN — CALCIUM CARBONATE 600 MG (1,500 MG)-VITAMIN D3 400 UNIT TABLET 1 TABLET: at 11:51

## 2024-04-13 RX ADMIN — CYCLOSPORINE 125 MG: 25 CAPSULE, LIQUID FILLED ORAL at 07:11

## 2024-04-13 RX ADMIN — LIDOCAINE AND PRILOCAINE: 25; 25 CREAM TOPICAL at 07:09

## 2024-04-13 RX ADMIN — CLONIDINE HYDROCHLORIDE 0.1 MG: 0.1 TABLET ORAL at 21:15

## 2024-04-13 RX ADMIN — APIXABAN 2.5 MG: 2.5 TABLET, FILM COATED ORAL at 07:04

## 2024-04-13 RX ADMIN — SODIUM CHLORIDE 300 ML: 9 INJECTION, SOLUTION INTRAVENOUS at 07:43

## 2024-04-13 RX ADMIN — METOPROLOL TARTRATE 25 MG: 25 TABLET, FILM COATED ORAL at 19:46

## 2024-04-13 RX ADMIN — APIXABAN 2.5 MG: 2.5 TABLET, FILM COATED ORAL at 19:47

## 2024-04-13 RX ADMIN — Medication 2.5 MCG: at 19:46

## 2024-04-13 RX ADMIN — LEVOTHYROXINE SODIUM 25 MCG: 0.03 TABLET ORAL at 07:05

## 2024-04-13 RX ADMIN — Medication 40 MG: at 07:05

## 2024-04-13 RX ADMIN — LOPERAMIDE HCL 4 MG: 1 SOLUTION ORAL at 11:51

## 2024-04-13 ASSESSMENT — ACTIVITIES OF DAILY LIVING (ADL)
ADLS_ACUITY_SCORE: 28

## 2024-04-13 NOTE — PLAN OF CARE
Neuro: Patient alert and oriented x4.    Cardiac: HR 60-80s. -110s/60s. Afebrile.        Respiratory: Sating >88% on RA. Using home AVAPS overnight, if can't get mask to fit right overnight RT was made aware by MD that we should use hospital bipap on AVAPs settings to ensure patient wears it while asleep. Baseline C02 in 60's.   GI/: Anuric, on dialysis. Loose stools, 1 BM today.   Diet/appetite: Renal diet, appetite improving. TF via J tube @ 40 mL/hr w/ Q4 30 mL FWF from 6602-0510.  Activity: Independent, up ad magalie. Steady on feet.   Pain: Has denied pain throughout the day.  Skin: No new deficits noted.  LDA's: R PIV - SL. L HD fistula.     Plan: Dialysis completed today. Most likely will discharge on Monday. Continue with POC. Notify primary team with changes

## 2024-04-13 NOTE — PROGRESS NOTES
[facility]    Progress Note - Medicine Service, NILE TEAM 3       Date of Admission:  2/10/2024    Assessment & Plan   Sofie Rodriguez is a 63 year old female with PMH COPD s/p bilateral lung transplant 6/28/22 c/b hemidiaphragm palsy and recurrent pneumonias, gastroparesis and small bowel dysmotility complicated by severe malnutrition now s/p PEG/J, ESRD on M/W/F HD, recent R femoral fx s/p ORIF, chronic diarrhea, recurrent c-diff, failure to thrive with inability to tolerate any tube feeds, who was admitted to [facility] on 2/10/24 for concerns over malnutrition and TPN initiation via portacath. Course complicated by recurrent and progressive acute on chronic hypoxic and hypercarbic respiratory failure requiring multiple ICU admissions and intubation 2/18-2/19, 2/28-2/29, 3/18-3/20, for continuous BiPAP. Again with worsening respiratory acidosis and hypercarbia, concern for infection vs acute rejection, requiring transfer back to ICU 3/20/2024 for intubation and bronchoscopy. Weaned off ventilator to RA and BIPAP at night. Transferred to medicine on 4/4/2024. Improved clinical status with adjustments to BiPAP, bicarb bath in HD, obtained a home AVAPs machine and have been ensuring stable settings prior to discharge.    Changes today:   - pCO2 stable today, but pH worse today  - Did not tolerate AVAPS mask well overnight, ongoing troubleshooting with RT  - Cotinue daily VBGs to make sure hypercapnia is controlled, anticipate discharge on Monday  - Transplant pulm following for AVAPs adjustments  - Can switch to hospital AVAPS if needed overnight, discussed with RT    # Acute on chronic hypoxic and hypercarbic respiratory failure requiring intubation 2/17-2/19 3/25-4/2, improved  # Recurrent PNAs, concern for acute infection vs acute rejection  # S/p BSLT 6/8/22 for COPD  # R hemidiaphragm palsy   # Positive DSA   # Respiratory acidosis, improved with high  bicarb bath with HD on 4/9/2024   Hx bilateral lung transplant on 6/28/2022 for COPD.  Initially admitted on 2/10/2024 for initiation of TPN.  However she was admitted to the ICU on 2/17/2024 for hypoxic hypercarbic respiratory failure.  She was intubated on 2/18 - 2/19 due to pulmonary edema vs infection.  Was extubated onto BiPAP/AVAPS with improvement in mentation however continued to have respiratory acidosis.She was transferred to medicine floor on 2/29.  Was transferred back to the ICU on 3/18 - 3/20 due to hypercarbia and severe respiratory acidosis that did not require intubation.  Patient again experience worsening respiratory acidosis and hypercarbia will concern for infection VS acute rejection that required transfer back to ICU on 3/20/2024 for intubation and bronch.  She was weaned off the ventilator to room air and BiPAP at night.  Transferred to medicine on 4//2024.  She has had imaging to evaluate neurologic causes of decreased respiratory drive, without any notable findings.  Mentation remains stable despite low pH and increasing pCO2.  Volume status has improved with hemodialysis.  Overall her pCO2 retention is thought to be multifactorial, with a component of overfeeding through TPN, infection, bicarb loss through GI sources. Previously limited by intolerance d/t claustrophobia, but this has improved significantly. Troubleshooting mask fit on 4/13/2024. Only was able to tolerate   - Daily VBG with adjustments to NIPPV   - Transplant pulm following, appreciate recs  - Immunosuppression:   > Prednisone 5 mg every morning, 2.5 mg every afternoon  > Cyclosporine 100mg BID (Stopped tacrolimus 3/10)   > Overnight oximetry study suggestive of O2 vs CPAP need, as did require up to 2LPM overnight to prevent hypoxia  > Minimize O2 to preserve respiratory drive, given IVIG for DSA+   > D/c'd theophylline 3/3/24 due to difficulty attaining therapeutic levels (started by ICU), could consider Modafinil   -  Airway clearance/nebs:  --> resume BID scheduled if secretions worsen/thicken  - Xopenex neb BID PRN  - Mucomyst stopped 4/4   - Volume removal with iHD   - Increased bicarb bath with dialysis on 4/9/2024  - NIPPV settings   - AVAPS at Tv 375 (425 prior), 12 minimum mmHg, max 25, and EPAP 5  - BIPAP/AVAPS at hs and naps at all times. Ideally 7-8 hours overnight, limited by claustrophobia, medications as below  - Home BIPAP delivered from home, patient had issues with leakage around mask overnight, troubleshooting continues  - Zyprexa 5mg at bedtime   - Atarax 25 mg TID PRN for anxiety  - Clonidine 0.1 mg at bedtime  - High risk for reintubation given hx of hypercarbia     L Ear pain and muffled hearing, improving  Rhinorrhea, resolved  Patient notes 3 days of L ear pain and mild rhinorrhea without cough or worsening SOB over the last 2-3 days. No systemic signs of new infection. No discharge from L ear on exam. Moderate ear wax burden. No bleeding or erythema. Low concern for acute otitis media. Could be a viral URI vs discomfort from using BIPAP/AVAPs more consistently leading to pressure imbalance across her tympanic membrane.   - CTM for now  - Daily fluticasone nasal sprays started on 4/7/2024     # Goals of Care  - 3/15 Care conference on with Transplant Pulm, [facility] Care, Medicine, daughters (Julia Nash) and Transplant SW. Overall medical updates provided and Qs answered. With declining respiratory acidosis on labs, discussed code status 3/18. Patient initially not desiring any escalation of her care to ICU/intubation with frustration re overall course. However, after discussing with her daughter on the phone she and family elected to remain full code and agreed to ICU admission and intubation if necessary.   - 3/29 Care conference: Laid out a few options for family and patient to consider with qs answered. Examples being we could attempt to extubate to BiPAP but plan should be established prior to extubation  that if patient decompensates and requires reintubation then likely pursue trach versus more comfort approach. Other option is going straight for trach now. Patient and family (daughter Julia and son present on Ipad) considering the options and had a lot of questions about trach and what that would look like long-term, which was laid out for the family and patient.   - Pt made the decision this hospitalization, that if needed down the line she would be acceptable of a tracheostomy     # Chronic osmotic diarrhea/SIBO s/p Rifaximin  # Recurrent C.Diff (3rd ep 3/12/24)    # Diarrhea, improving  Recurrent C.diff 3/12, Fidaxomicin x 10 days (3/13-3/22), with improvement in diarrhea. Transplant pulm requesting repeat colonoscopy w/ Bx after completion of c.diff treatment, to r/o toxicity or other reason that diarrhea is present.  C.diff and enteric panel on 4/4/2024 was unremarkable.  Patient initially agreeable to colonoscopy, however later noted that she would like to defer colonoscopy for concerns regarding her weight and reintubation for the procedure. Diarrhea improving with PRN imodium.   - GI consult for recurrent diarrhea in the MICU, appreciate recs   - C.diff and enteric panel negative  - Confirmed Osmotic diarrhea with stool studies--> Likely not infectious, continue adjusting TF and loperamide PRN  - Bowel regimen PRN  - Imodium PRN     # Severe malnutrition   # Failure to thrive  # Hypoalbuminemia  # Gastroparesis, small bowel dysmotility  # S/P PEG/J with intolerance of enteral nutrition  Patient with gastroparesis (presumed due to vagal injury) and small bowel dysmotility complicated by unintentional 40lb weight loss over the past year and now severe malnutrition. Previously intolerant to oral food intake due to nausea. Was initially admitted for portacath and TPN initiation since 2/13. Intermittently tolerating feeds without n/v from 2/20.  Per GI, no ongoing concerns for SIBO as of 2/23. As of 3/11  transplant pulmonology is worried that TPN is not a realistic plan to continue at home and transitioned back to TF (RD oncerned pt is not absorbing any oral intake). TPN discontinued 3/16. Transplant pulm also worried about mucosal toxicity.   - Metabolic cart 24 following 24 hours of calorie counts   - Nutrition consulted, appreciate assistance  - Continue TF increasing goal rate via PEG/J   > Working on consolidating as much as possible    > Ask if J feeds must be held for synthroid or not  - Speech saw pt 4/3 okay for regular diet with thin liquids; will continue pt tube feeds via pts g/j tube (hx of gastroparesis requiring TPN) until pt able to take in adequate PO nutrition          - PEGJ dislodged on 2024 and replaced with IR on 2024    # Recurrent pneumonia, concern for acute infection vs rejection  # Recurrent C.Diff colitis (3rd ep 3/12/24)  # Hx EBV viremia   # Hypogammaglobulinemia  # Chronic immunosuppression  lung transplant  Empirically treated for pneumonia  - , RVP and cultures no growth to date. Recurrent C.Diff Positive 3/12, s/p PO Fidaxomicin 200 mg BID for 10 days (3/13-3/22) with improvement in diarrhea. Remains afebrile, leukocytosis resolved.  Ig, s/p IVIG.  EBV 27K (decreased compared to prior).     - Follow up BAL and blood cultures from 3/24, NGTD     Antibiotics:  Zosyn ( - , 3/24 - )   Bactrim (MWF, PJP ppx, previously on Dapsone)  Vancomycin ( - , 3/24-3/25)  Micafungin (- , 3/24 x1)  Fidaxomicin (3/13-3/22)     Micro:   - BAL 3/24:   - Cell count w diff: PMN 75%, lymphocytes 5, monocytes 19, eos 1  - No organisms seen on Gram stain  - RVP, HSV, Histoplasma neg  - Fungal & bacterial cultures NGTD  - EBV, CMV, PJP, Aspergillus, Legionella, Mycoplasma, AFB in process  - Bcx 3/24 NGTD    Chronic issues    # A-flutter (recurrent on 3/17)  # A-fib, intermittent  # HTN  # HFpEF  Noted Afib/flutter intermittently throughout  admission. Was started on amiodarone bolus with drip and transitioned to PO dosing.  2/17 TTE: LVEF 55-60%, global RV function normal, no significant valvular abnormalities. Briefly required levophed and midodrine for HD but otherwise stable off pressors.   - MAP goal > 65 mmHg   - Continue Metoprolol tartrate dose 25 mg BID (hold parameters if MAP<65 or hr less than 60, hold on dialysis days)  - Continue amiodarone 200 mg PO  - Midodrine with HD as needed  - Continues Apixaban     # Hypothyroidism  Patient with new (2/17/24) low T4 at 0.64 and TSH at 6.5, concerning for new hypothyroidism vs sick thyroid syndrome. TSH with appropriate response, more consistent with elevation in the setting of acute illness. ICU team on 2/28 recommended initiation of treatment for possible hypothyroid as this can improve respiratory muscle function in the setting of weakness and malnutrition. 3/7, 3/15, 3/20 repeat thyroid function WNL.  - Continue liothyronine + levothyroxine -- note that prolonged combination therapy is not optimal & regimen should be re-evaluated (likely stop liothyronine, up-titrate levothyroxine) in post-acute setting    # Hx of Anxiety   # Claustrophobia  Reports claustrophobia contributing to limited compliance with BiPAP. Has seen health psych on 3/20, recommended grounding exercise (5-4-3-2-1) for use on BiPAP.   - Zyprexa 5mg at bedtime   - Atarax 25 mg TID PRN for anxiety  - Clonidine 0.1 mg at bedtime    # ESRD on HD TTS   # Mild hyperphosphatemia  Patient is ESRD on T/Th/Sat HD as outpt, now approx M/W/F inpatient. HD tolerating until 3/23. Briefly required extra Monday runs, not since being off TPN. She would prefer longer sessions to more days.  - Midodrine 10mg BID PRN with dialysis days   - Increase bicarb bath during dialysis   - Sevelamer per Nephrology   - CBC and CMP daily  - Strict I/Os  - Daily weight   - Renally adjust medications     #Acute on chronic anemia 2/2 ESRD  Hgb stable, with no  acute signs of bleeding.   - Daily CBC  - Transfuse for Hgb < 7  - Continue Epogen with dialysis, held in setting of concern for acute infection   - Continue Venofer 50 mcg qweek with dialysis, held in setting of concern for acute infection    # Suspected CARLEE  # PTA nocturnal O2, 2L   - Sleep clinic eval at discharge for suspected CARLEE    # GERD  - PPI BID    # Hyperglycemia, stress induced  Has been euglycemic for the past few days. Not on insulin.  - Hypoglycemia protocol     Diet: Renal Diet (dialysis)  Adult Formula Drip Feeding: Specified Time Eneida Stoll Renal Support; Jejunostomy; Goal Rate: 40 mL/hr x 18 hours daily (8655-9710); mL/hr; From: 1:00 PM; To: 7:00 AM  Snacks/Supplements Adult: Nepro Oral Supplement; With Meals    DVT Prophylaxis: DOAC  Dong Catheter: Not present  Fluids: None  Lines: None       Cardiac Monitoring: None  Code Status: Full Code      Clinically Significant Risk Factors              # Hypoalbuminemia: Lowest albumin = 2.6 g/dL at 2/18/2024  5:13 AM, will monitor as appropriate               # Severe Malnutrition: based on nutrition assessment    # Financial/Environmental Concerns: none         Disposition Plan   Pending medical stability and home BIPAP/AVAP coordination     The patient's care was discussed with the Attending Physician, Dr. Arita .    DELFIN LEDESMA MD  Medicine Service, [facility] TEAM [address] [place] [address]  Securely message with [employer] (more info)  Text page via [facility] Paging/Directory   See signed in provider for up to date coverage information  ______________________________________________________________________    Interval History   No acute events overnight.  However patient did not tolerate her AVAPS well.  She noted she only was able to keep her mask on for 2 hours.  Compliance is limited by leakage of air, notably from the top of the mask into her eyes.  She otherwise not noted any new chest pains or shortness of breath.  She  denies any new abdominal pains.  Noted chronic neuropathy in her bilateral lower extremities.  She denies any new weakness or numbness/tingling.      Physical Exam   Vital Signs: Temp: 98.1  F (36.7  C) Temp src: Oral BP: 110/61 Pulse: 70   Resp: 20 SpO2: 94 % O2 Device: None (Room air) Oxygen Delivery: 2 LPM  Weight: 91 lbs 4.33 oz    General: Awake, alert & oriented. Frail appearing. Sitting in bed   HEENT: Mucous membranes moist  Neuro: Awake and alert, no focal deficits, moving all extremities to command and spontaneously  Pulm/Resp: Clear breath sounds bilaterally, diminished on R>L, no accessory muscle use  CV: RRR, S1/S2 without m/r/g; pedal pulses 2+ without LE edema  Abdomen: Soft, non-distended, non-tender, in the morning 18 Fr in place with dressings on top without leakage or drainage.  : Rectal tube in place, (+) bruit/thrill in LUE fistula  Incisions/Skin: PICC and PEG site without surrounding erythema, no rashes noted    Medical Decision Making       Please see A&P for additional details of medical decision making.      Data   ------------------------- PAST 24 HR DATA REVIEWED -----------------------------------------------    I have personally reviewed the following data over the past 24 hrs:    N/A  \   N/A   / N/A     138 93 (L) 81.6 (H) /  181 (H)   3.6 29 5.25 (H) \     INR:  1.10 PTT:  N/A   D-dimer:  N/A Fibrinogen:  N/A       Imaging results reviewed over the past 24 hrs:   No results found for this or any previous visit (from the past 24 hour(s)).

## 2024-04-13 NOTE — PROGRESS NOTES
"Hemodialysis Progress Note    I personally reviewed the vital signs, medications, labs, and imaging.  Subjective: I examined the patient during dialysis. Tolerating without issues    Objective:  /61 (BP Location: Right arm, Cuff Size: Adult Regular)   Pulse 70   Temp 98.1  F (36.7  C) (Oral)   Resp 20   Ht 1.57 m (5' 1.81\")   Wt 41.4 kg (91 lb 4.3 oz)   SpO2 94%   BMI 16.80 kg/m      Intake/Output Summary (Last 24 hours) at 4/13/2024 1201  Last data filed at 4/13/2024 1200  Gross per 24 hour   Intake 2000 ml   Output 2500 ml   Net -500 ml         Problem list & Plan  # ESKD on Dialysis TThSat  -Diagnosis: ESKD on iHD  -Continue iHD while in house  -Left AVG  -- No issues with access    # MBD  - No changes    # Anemia of Renal Disease  - No changes    Yung Sorto MD  Date of Service (when I saw the patient): April 13, 2024    "

## 2024-04-13 NOTE — PROVIDER NOTIFICATION
Time of notification: 4:01 AM  Provider notified: Raiza Jurado MD  Patient status: Pt having difficulties with the AVAPs mask, waking her up few times, leaking and fixing.  She is currently on 2L NC, to help her sleep.    Temp:  [98  F (36.7  C)-98.7  F (37.1  C)] 98.7  F (37.1  C)  Pulse:  [] 100  Resp:  [16-20] 16  BP: ()/(49-67) 123/49  Cuff Mean (mmHg):  [67-68] 68  SpO2:  [96 %-100 %] 100 %  Orders received:  No response

## 2024-04-13 NOTE — PROGRESS NOTES
HEMODIALYSIS TREATMENT NOTE    Date: 4/13/2024  Time: 10:46 AM    Data:  Pre Wt: 41.4 kg (91 lb 4.3 oz)   Desired Wt: 38.9 kg   Post Wt: 38.9 kg (85 lb 12.1 oz)  Weight change: 2.5 kg  Ultrafiltration - Post Run Net Total Removed (mL): 2500 mL  Vascular Access Status: Graft  patent  Dialyzer Rinse: Light, Streaked  Total Blood Volume Processed: 90.04 L   Total Dialysis (Treatment) Time: 3.5   Dialysate Bath: K 3, Ca 2.25 , Bicarb 38  Heparin: None    Lab:   No    Interventions:  Pt dialyzed for 3.5 hrs via LUE AVG. Pt tolerated 2.5 L fluid removal.  6,000 unit Epoetin administered during tx. AVG cannulated with 15g needles. Positive for thrill and bruit. 450 BFR achieved with good pressure. Hemostasis achieved within 10 min post tx. Handoff report given to RIYA Marquez.       Assessment:  A&Ox4  Calm and cooperative  On RA  VSS  SR  AVG +T/B     Plan:    Next HD per renal

## 2024-04-13 NOTE — PROGRESS NOTES
Pulmonary Firm Brief Note   April 13, 2024       Overnight nursing notes indicate new intolerance of nocturnal AVAPS last night d/t poor mask fit and leaking.  Pt. has demonstrated excellent tolerance and compliance with NIPPV since extubation on 4/2 and now recently with good stability of hypercapnia with modifications to dialysis bicarbonate bath.  Pt. with readmissions to the ICU several times this admission for worsening hypercapnia attributed to inconsistent NIPPV use while sleeping.    RT messaged via [employer] asking to triage mask fit today to remedy new issues that interfered with use of NIPPV overnight.  Pt. remains on home AVAPS machine.    Catherine Johnson, DNP, APRN, CNP  Inpatient Nurse Practitioner  Pulmonary CF/Transplant

## 2024-04-13 NOTE — PLAN OF CARE
Goal Outcome Evaluation:  4/12 1900 to 4/13 0700    Neuro: A&Ox4.    Cardiac: VSS.   Respiratory: RA during the day.  Sating > 92 on 2L NC currently.  Woke up a few times on the AVAPAs, RN assisted with Pt with mask.  Pt was struggling with leak, tried few times adjusting AVAPs mask.  Pt requested to be on 2L NC.    GI/: oliguric-pt on HD. No BM on shift.  Diet/appetite: Tolerating Renal diet.   Activity:  IND, up to commode.  Pain: At acceptable level on current regimen.   Skin: No new deficits noted.  LDA's: 1 R PIV-SL and HD vascular access.    Plan: Prep for dialysis at 0740.  Continue with POC. Notify primary team with changes.

## 2024-04-14 LAB
ACID FAST STAIN (ARUP): NORMAL
ANION GAP SERPL CALCULATED.3IONS-SCNC: 14 MMOL/L (ref 7–15)
BASE EXCESS BLDV CALC-SCNC: 3.9 MMOL/L (ref -3–3)
BUN SERPL-MCNC: 51.5 MG/DL (ref 8–23)
CALCIUM SERPL-MCNC: 9 MG/DL (ref 8.8–10.2)
CHLORIDE SERPL-SCNC: 96 MMOL/L (ref 98–107)
CREAT SERPL-MCNC: 3.73 MG/DL (ref 0.51–0.95)
DEPRECATED HCO3 PLAS-SCNC: 27 MMOL/L (ref 22–29)
EGFRCR SERPLBLD CKD-EPI 2021: 13 ML/MIN/1.73M2
ERYTHROCYTE [DISTWIDTH] IN BLOOD BY AUTOMATED COUNT: 17.2 % (ref 10–15)
GLUCOSE SERPL-MCNC: 94 MG/DL (ref 70–99)
HCO3 BLDV-SCNC: 31 MMOL/L (ref 21–28)
HCT VFR BLD AUTO: 35.8 % (ref 35–47)
HGB BLD-MCNC: 11 G/DL (ref 11.7–15.7)
INR PPP: 1.15 (ref 0.85–1.15)
MAGNESIUM SERPL-MCNC: 1.7 MG/DL (ref 1.7–2.3)
MCH RBC QN AUTO: 34.3 PG (ref 26.5–33)
MCHC RBC AUTO-ENTMCNC: 30.7 G/DL (ref 31.5–36.5)
MCV RBC AUTO: 112 FL (ref 78–100)
O2/TOTAL GAS SETTING VFR VENT: 21 %
OXYHGB MFR BLDV: 80 % (ref 70–75)
PCO2 BLDV: 59 MM HG (ref 40–50)
PH BLDV: 7.33 [PH] (ref 7.32–7.43)
PHOSPHATE SERPL-MCNC: 4.7 MG/DL (ref 2.5–4.5)
PLATELET # BLD AUTO: 209 10E3/UL (ref 150–450)
PO2 BLDV: 49 MM HG (ref 25–47)
POTASSIUM SERPL-SCNC: 4.1 MMOL/L (ref 3.4–5.3)
RBC # BLD AUTO: 3.21 10E6/UL (ref 3.8–5.2)
SAO2 % BLDV: 81.5 % (ref 70–75)
SODIUM SERPL-SCNC: 137 MMOL/L (ref 135–145)
WBC # BLD AUTO: 6.5 10E3/UL (ref 4–11)

## 2024-04-14 PROCEDURE — 250N000013 HC RX MED GY IP 250 OP 250 PS 637: Performed by: STUDENT IN AN ORGANIZED HEALTH CARE EDUCATION/TRAINING PROGRAM

## 2024-04-14 PROCEDURE — 80048 BASIC METABOLIC PNL TOTAL CA: CPT

## 2024-04-14 PROCEDURE — 250N000013 HC RX MED GY IP 250 OP 250 PS 637

## 2024-04-14 PROCEDURE — 120N000003 HC R&B IMCU UMMC

## 2024-04-14 PROCEDURE — 99232 SBSQ HOSP IP/OBS MODERATE 35: CPT | Mod: GC | Performed by: STUDENT IN AN ORGANIZED HEALTH CARE EDUCATION/TRAINING PROGRAM

## 2024-04-14 PROCEDURE — 250N000012 HC RX MED GY IP 250 OP 636 PS 637

## 2024-04-14 PROCEDURE — 83735 ASSAY OF MAGNESIUM: CPT | Performed by: STUDENT IN AN ORGANIZED HEALTH CARE EDUCATION/TRAINING PROGRAM

## 2024-04-14 PROCEDURE — 82805 BLOOD GASES W/O2 SATURATION: CPT | Performed by: NURSE PRACTITIONER

## 2024-04-14 PROCEDURE — 99232 SBSQ HOSP IP/OBS MODERATE 35: CPT | Performed by: NURSE PRACTITIONER

## 2024-04-14 PROCEDURE — 84100 ASSAY OF PHOSPHORUS: CPT | Performed by: STUDENT IN AN ORGANIZED HEALTH CARE EDUCATION/TRAINING PROGRAM

## 2024-04-14 PROCEDURE — 999N000157 HC STATISTIC RCP TIME EA 10 MIN

## 2024-04-14 PROCEDURE — 250N000012 HC RX MED GY IP 250 OP 636 PS 637: Performed by: NURSE PRACTITIONER

## 2024-04-14 PROCEDURE — 85610 PROTHROMBIN TIME: CPT | Performed by: STUDENT IN AN ORGANIZED HEALTH CARE EDUCATION/TRAINING PROGRAM

## 2024-04-14 PROCEDURE — 36415 COLL VENOUS BLD VENIPUNCTURE: CPT | Performed by: NURSE PRACTITIONER

## 2024-04-14 PROCEDURE — 85027 COMPLETE CBC AUTOMATED: CPT

## 2024-04-14 RX ORDER — CALCIUM CARBONATE/VITAMIN D3 600 MG-10
1 TABLET ORAL
Qty: 90 TABLET | Refills: 0 | Status: SHIPPED | OUTPATIENT
Start: 2024-04-14 | End: 2024-09-18

## 2024-04-14 RX ORDER — MIDODRINE HYDROCHLORIDE 10 MG/1
10 TABLET ORAL 2 TIMES DAILY PRN
Qty: 30 TABLET | Refills: 0 | Status: SHIPPED | OUTPATIENT
Start: 2024-04-14

## 2024-04-14 RX ORDER — LIOTHYRONINE SODIUM 5 UG/1
2.5 TABLET ORAL 2 TIMES DAILY
Qty: 15 TABLET | Refills: 0 | Status: SHIPPED | OUTPATIENT
Start: 2024-04-14 | End: 2024-09-18

## 2024-04-14 RX ORDER — AMOXICILLIN 250 MG
1 CAPSULE ORAL 2 TIMES DAILY PRN
Qty: 300 TABLET | Refills: 0 | Status: SHIPPED | OUTPATIENT
Start: 2024-04-14 | End: 2024-09-18

## 2024-04-14 RX ORDER — CYCLOSPORINE 25 MG/1
125 CAPSULE, LIQUID FILLED ORAL 2 TIMES DAILY
Qty: 300 CAPSULE | Refills: 11 | Status: SHIPPED | OUTPATIENT
Start: 2024-04-14 | End: 2024-04-26

## 2024-04-14 RX ORDER — OLANZAPINE 5 MG/1
5 TABLET, ORALLY DISINTEGRATING ORAL AT BEDTIME
Qty: 30 TABLET | Refills: 0 | Status: SHIPPED | OUTPATIENT
Start: 2024-04-14 | End: 2024-05-13

## 2024-04-14 RX ORDER — LEVOTHYROXINE SODIUM 25 UG/1
25 TABLET ORAL DAILY
Qty: 30 TABLET | Refills: 0 | Status: SHIPPED | OUTPATIENT
Start: 2024-04-15 | End: 2024-05-13

## 2024-04-14 RX ORDER — AMIODARONE HYDROCHLORIDE 200 MG/1
200 TABLET ORAL DAILY
Qty: 30 TABLET | Refills: 0 | Status: SHIPPED | OUTPATIENT
Start: 2024-04-15 | End: 2024-05-13

## 2024-04-14 RX ORDER — LIDOCAINE 4 G/G
1 PATCH TOPICAL EVERY 24 HOURS
Qty: 30 PATCH | Refills: 0 | Status: SHIPPED | OUTPATIENT
Start: 2024-04-14 | End: 2024-05-14

## 2024-04-14 RX ORDER — SULFAMETHOXAZOLE AND TRIMETHOPRIM 400; 80 MG/1; MG/1
1 TABLET ORAL
Qty: 30 TABLET | Refills: 2 | Status: SHIPPED | OUTPATIENT
Start: 2024-04-16

## 2024-04-14 RX ORDER — PREDNISONE 5 MG/1
5 TABLET ORAL EVERY MORNING
Qty: 30 TABLET | Refills: 11 | Status: SHIPPED | OUTPATIENT
Start: 2024-04-15

## 2024-04-14 RX ORDER — FLUTICASONE PROPIONATE 50 MCG
1 SPRAY, SUSPENSION (ML) NASAL DAILY
Qty: 9.9 ML | Refills: 1 | Status: SHIPPED | OUTPATIENT
Start: 2024-04-15 | End: 2024-06-26

## 2024-04-14 RX ORDER — CLONIDINE HYDROCHLORIDE 0.1 MG/1
0.1 TABLET ORAL AT BEDTIME
Qty: 30 TABLET | Refills: 0 | Status: SHIPPED | OUTPATIENT
Start: 2024-04-14 | End: 2024-09-18

## 2024-04-14 RX ADMIN — OLANZAPINE 5 MG: 5 TABLET, ORALLY DISINTEGRATING ORAL at 21:12

## 2024-04-14 RX ADMIN — CALCIUM CARBONATE 600 MG (1,500 MG)-VITAMIN D3 400 UNIT TABLET 1 TABLET: at 11:49

## 2024-04-14 RX ADMIN — LIDOCAINE 4% 1 PATCH: 40 PATCH TOPICAL at 19:43

## 2024-04-14 RX ADMIN — CYCLOSPORINE 125 MG: 25 CAPSULE, LIQUID FILLED ORAL at 07:36

## 2024-04-14 RX ADMIN — SEVELAMER CARBONATE 0.8 G: 800 POWDER, FOR SUSPENSION ORAL at 07:35

## 2024-04-14 RX ADMIN — PREDNISONE 5 MG: 5 TABLET ORAL at 07:35

## 2024-04-14 RX ADMIN — CALCIUM CARBONATE 600 MG (1,500 MG)-VITAMIN D3 400 UNIT TABLET 1 TABLET: at 17:30

## 2024-04-14 RX ADMIN — LOPERAMIDE HCL 4 MG: 1 SOLUTION ORAL at 17:30

## 2024-04-14 RX ADMIN — Medication 2.5 MCG: at 07:35

## 2024-04-14 RX ADMIN — SEVELAMER CARBONATE 0.8 G: 800 POWDER, FOR SUSPENSION ORAL at 17:30

## 2024-04-14 RX ADMIN — APIXABAN 2.5 MG: 2.5 TABLET, FILM COATED ORAL at 07:35

## 2024-04-14 RX ADMIN — LEVOTHYROXINE SODIUM 25 MCG: 0.03 TABLET ORAL at 07:35

## 2024-04-14 RX ADMIN — CALCIUM CARBONATE 600 MG (1,500 MG)-VITAMIN D3 400 UNIT TABLET 1 TABLET: at 07:35

## 2024-04-14 RX ADMIN — Medication 40 MG: at 19:43

## 2024-04-14 RX ADMIN — APIXABAN 2.5 MG: 2.5 TABLET, FILM COATED ORAL at 19:42

## 2024-04-14 RX ADMIN — METOPROLOL TARTRATE 25 MG: 25 TABLET, FILM COATED ORAL at 07:36

## 2024-04-14 RX ADMIN — Medication 2.5 MCG: at 19:42

## 2024-04-14 RX ADMIN — CLONIDINE HYDROCHLORIDE 0.1 MG: 0.1 TABLET ORAL at 21:29

## 2024-04-14 RX ADMIN — Medication 40 MG: at 07:35

## 2024-04-14 RX ADMIN — CYCLOSPORINE 125 MG: 25 CAPSULE, LIQUID FILLED ORAL at 17:22

## 2024-04-14 RX ADMIN — AMIODARONE HYDROCHLORIDE 200 MG: 200 TABLET ORAL at 07:35

## 2024-04-14 RX ADMIN — METOPROLOL TARTRATE 25 MG: 25 TABLET, FILM COATED ORAL at 19:42

## 2024-04-14 ASSESSMENT — ACTIVITIES OF DAILY LIVING (ADL)
ADLS_ACUITY_SCORE: 28
ADLS_ACUITY_SCORE: 28
ADLS_ACUITY_SCORE: 29
ADLS_ACUITY_SCORE: 28
ADLS_ACUITY_SCORE: 29
ADLS_ACUITY_SCORE: 28
ADLS_ACUITY_SCORE: 28
ADLS_ACUITY_SCORE: 29
ADLS_ACUITY_SCORE: 28

## 2024-04-14 NOTE — PLAN OF CARE
"BP 95/53   Pulse 91   Temp 98.4  F (36.9  C) (Axillary)   Resp 16   Ht 1.57 m (5' 1.81\")   Wt 41.4 kg (91 lb 4.3 oz)   SpO2 100%   BMI 16.80 kg/m      PRN midodrine given once at 23:00 for SBP in the 80's. Re-check SBP in the low 100's. MAP's >65. All other VSS. Afebrile. Alert and oriented x 4. Denies pain. Tolerating TF's/diet with no n/v. G/J tube with cycled tubes through J-tube. G-tube clamped. No UOP. Continent of 1 medium formed BM overnight. Up to commode independently. Right PIV saline locked. Repositioning self in bed. Continue to monitor.  "

## 2024-04-14 NOTE — PLAN OF CARE
Neuro: Patient alert and oriented x4.    Cardiac: HR 60s. BP 90s-100s systolic. Afebrile.        Respiratory: Sating >88% on RA. Using home AVAPS overnight, if can't get mask to fit right overnight, RT aware that we should use hospital bipap on AVAPs settings to ensure patient wears it while asleep. Baseline C02 in upper 50's to lower 60's.   GI/: Anuric, on dialysis. 1 BM this shift.   Diet/appetite: Renal diet, good appetite. Cycled TF via J tube @ 40 mL/hr w/ Q4 30 mL FWF from 5973-8333. G clamped.   Activity: Independent, up ad magalie. Steady on feet.   Pain: Has denied pain throughout the day.  Skin: Granulation tissue present under peg tube. Team aware and consulted IR to look at it before discharging tomorrow.   LDA's: R PIV - SL. L HD fistula.     Plan: Discharging tomorrow after being seen by IR for granulation tissue under peg. Continue with POC. Notify primary team with changes.

## 2024-04-14 NOTE — PLAN OF CARE
Goal Outcome Evaluation:  4/13 1900 to 2300    Neuro: A&Ox4.   Cardiac: VSS.  Respiratory: Currently on home/personal AVAPs machine.  Sating on >92.  When awake, Pt is on RA.  GI/: Oliguric, Pt on HD (Tu,Th,Sat). No BM on shift.  Diet/appetite: Tolerating Renal diet. Cycled TF until 1300 to 0700 at 40 ml/hr and 30 ml FWF q4h.   Activity:  IND, up to commode.  Pain: At acceptable level on current regimen.   Skin: No new deficits noted.  LDA's: L fistula and R PIV - SL.      Plan: Continue with POC. Notify primary team with changes.  Pending discharge on Monday.

## 2024-04-14 NOTE — PROGRESS NOTES
[facility]    Progress Note - Medicine Service, NILE TEAM 3       Date of Admission:  2/10/2024    Assessment & Plan   Sofie Rodriguez is a 63 year old female with PMH COPD s/p bilateral lung transplant 6/28/22 c/b hemidiaphragm palsy and recurrent pneumonias, gastroparesis and small bowel dysmotility complicated by severe malnutrition now s/p PEG/J, ESRD on M/W/F HD, recent R femoral fx s/p ORIF, chronic diarrhea, recurrent c-diff, failure to thrive with inability to tolerate any tube feeds, who was admitted to [facility] on 2/10/24 for concerns over malnutrition and TPN initiation via portacath. Course complicated by recurrent and progressive acute on chronic hypoxic and hypercarbic respiratory failure requiring multiple ICU admissions and intubation 2/18-2/19, 2/28-2/29, 3/18-3/20, for continuous BiPAP. Again with worsening respiratory acidosis and hypercarbia, concern for infection vs acute rejection, requiring transfer back to ICU 3/20/2024 for intubation and bronchoscopy. Weaned off ventilator to RA and BIPAP at night. Transferred to medicine on 4/4/2024. Improved clinical status with adjustments to BiPAP, bicarb bath in HD, obtained a home AVAPs machine and have been ensuring stable settings prior to discharge.    Changes today:   - pCO2 and pH improved today, tolerated home mask better  - Daily VBGs  - Transplant pulm placed discharge medications, appreciate recs  - Discharge tomorrow if continues to be stable  - Monitoring granulomatous tissue under PEGJ     # Acute on chronic hypoxic and hypercarbic respiratory failure requiring intubation 2/17-2/19 3/25-4/2, improved  # Recurrent PNAs, concern for acute infection vs acute rejection  # S/p BSLT 6/8/22 for COPD  # R hemidiaphragm palsy   # Positive DSA   # Respiratory acidosis, pH improved with high bicarb bath with HD on 4/9/2024   Hx bilateral lung transplant on 6/28/2022 for COPD.  Initially  admitted on 2/10/2024 for initiation of TPN.  However she was admitted to the ICU on 2/17/2024 for hypoxic hypercarbic respiratory failure.  She was intubated on 2/18 - 2/19 due to pulmonary edema vs infection.  Was extubated onto BiPAP/AVAPS with improvement in mentation however continued to have respiratory acidosis.She was transferred to medicine floor on 2/29.  Was transferred back to the ICU on 3/18 - 3/20 due to hypercarbia and severe respiratory acidosis that did not require intubation.  Patient again experience worsening respiratory acidosis and hypercarbia will concern for infection VS acute rejection that required transfer back to ICU on 3/20/2024 for intubation and bronch.  She was weaned off the ventilator to room air and BiPAP at night.  Transferred to medicine on 4//2024.  She has had imaging to evaluate neurologic causes of decreased respiratory drive, without any notable findings.  Mentation remains stable despite low pH and increasing pCO2.  Volume status has improved with hemodialysis.  Overall her pCO2 retention is thought to be multifactorial, with a component of overfeeding through TPN, infection, bicarb loss through GI sources. Previously limited by intolerance d/t claustrophobia, but this has improved significantly. Troubleshooting mask fit on 4/13/2024. Only was able to tolerate   - Daily VBG with adjustments to NIPPV   - Transplant pulm following, appreciate recs  - Immunosuppression:   > Prednisone 5 mg every morning, 2.5 mg every afternoon  > Cyclosporine 100mg BID (Stopped tacrolimus 3/10)   > Overnight oximetry study suggestive of O2 vs CPAP need, as did require up to 2LPM overnight to prevent hypoxia  > Minimize O2 to preserve respiratory drive, given IVIG for DSA+   > D/c'd theophylline 3/3/24 due to difficulty attaining therapeutic levels (started by ICU), could consider Modafinil   - Airway clearance/nebs:  --> resume BID scheduled if secretions worsen/thicken  - Xopenex neb BID  PRN  - Mucomyst stopped 4/4   - Volume removal with iHD   - Increased bicarb bath with dialysis on 4/9/2024  - NIPPV settings   - AVAPS at Tv 375, 12 minimum mmHg, max 25, and EPAP 5  - BIPAP/AVAPS at hs and naps at all times. Ideally 7-8 hours overnight, limited by claustrophobia, medications as below  - Home BIPAP delivered from home  - Zyprexa 5mg at bedtime   - Atarax 25 mg TID PRN for anxiety  - Clonidine 0.1 mg at bedtime  - High risk for reintubation given hx of hypercarbia     L Ear pain and muffled hearing, improving  Rhinorrhea, resolved  Patient notes 3 days of L ear pain and mild rhinorrhea without cough or worsening SOB over the last 2-3 days. No systemic signs of new infection. No discharge from L ear on exam. Moderate ear wax burden. No bleeding or erythema. Low concern for acute otitis media. Could be a viral URI vs discomfort from using BIPAP/AVAPs more consistently leading to pressure imbalance across her tympanic membrane.   - CTM for now  - Daily fluticasone nasal sprays started on 4/7/2024     # Goals of Care  - 3/15 Care conference on with Transplant Pulm, Pall Care, Medicine, daughters (Julia Nash) and Transplant SW. Overall medical updates provided and Qs answered. With declining respiratory acidosis on labs, discussed code status 3/18. Patient initially not desiring any escalation of her care to ICU/intubation with frustration re overall course. However, after discussing with her daughter on the phone she and family elected to remain full code and agreed to ICU admission and intubation if necessary.   - 3/29 Care conference: Laid out a few options for family and patient to consider with qs answered. Examples being we could attempt to extubate to BiPAP but plan should be established prior to extubation that if patient decompensates and requires reintubation then likely pursue trach versus more comfort approach. Other option is going straight for trach now. Patient and family (daughter  Julia and son present on Ipad) considering the options and had a lot of questions about trach and what that would look like long-term, which was laid out for the family and patient.   - Pt made the decision this hospitalization, that if needed down the line she would be acceptable of a tracheostomy     # Chronic osmotic diarrhea/SIBO s/p Rifaximin  # Recurrent C.Diff (3rd ep 3/12/24)    # Diarrhea, improving  Recurrent C.diff 3/12, Fidaxomicin x 10 days (3/13-3/22), with improvement in diarrhea. Transplant pulm requesting repeat colonoscopy w/ Bx after completion of c.diff treatment, to r/o toxicity or other reason that diarrhea is present.  C.diff and enteric panel on 4/4/2024 was unremarkable.  Patient initially agreeable to colonoscopy, however later noted that she would like to defer colonoscopy for concerns regarding her weight and reintubation for the procedure. Diarrhea improving with PRN imodium.   - GI consult for recurrent diarrhea in the MICU, appreciate recs   - C.diff and enteric panel negative  - Confirmed Osmotic diarrhea with stool studies--> Likely not infectious, continue adjusting TF and loperamide PRN  - Bowel regimen PRN  - Imodium PRN     # Severe malnutrition   # Failure to thrive  # Hypoalbuminemia  # Gastroparesis, small bowel dysmotility  # S/P PEG/J with intolerance of enteral nutrition  Patient with gastroparesis (presumed due to vagal injury) and small bowel dysmotility complicated by unintentional 40lb weight loss over the past year and now severe malnutrition. Previously intolerant to oral food intake due to nausea. Was initially admitted for portacath and TPN initiation since 2/13. Intermittently tolerating feeds without n/v from 2/20.  Per GI, no ongoing concerns for SIBO as of 2/23. As of 3/11 transplant pulmonology is worried that TPN is not a realistic plan to continue at home and transitioned back to TF (RD oncerned pt is not absorbing any oral intake). TPN discontinued 3/16.  Transplant pulm also worried about mucosal toxicity.   - Nutrition consulted, appreciate assistance  - Continue TF increasing goal rate via PEG/J   > Working on consolidating as much as possible    > Ask if J feeds must be held for synthroid or not  - Speech saw pt 4/3 okay for regular diet with thin liquids; will continue pt tube feeds via pts g/j tube (hx of gastroparesis requiring TPN) until pt able to take in adequate PO nutrition          - PEGJ dislodged on 2024 and replaced with IR on 2024  - Patient noted some granulomatous tissue around PEGJ site, improving per patient and non-tender    # Recurrent pneumonia, concern for acute infection vs rejection  # Recurrent C.Diff colitis (3rd ep 3/12/24)  # Hx EBV viremia   # Hypogammaglobulinemia  # Chronic immunosuppression  lung transplant  Empirically treated for pneumonia  - , RVP and cultures no growth to date. Recurrent C.Diff Positive 3/12, s/p PO Fidaxomicin 200 mg BID for 10 days (3/13-3/22) with improvement in diarrhea. Remains afebrile, leukocytosis resolved.  Ig, s/p IVIG.  EBV 27K (decreased compared to prior).     - Follow up BAL and blood cultures from 3/24, NGTD     Antibiotics:  Zosyn ( - , 3/24 - )   Bactrim (MWF, PJP ppx, previously on Dapsone)  Vancomycin ( - , 3/24-3/25)  Micafungin (- , 3/24 x1)  Fidaxomicin (3/13-3/22)     Micro:   - BAL 3/24:   - Cell count w diff: PMN 75%, lymphocytes 5, monocytes 19, eos 1  - No organisms seen on Gram stain  - RVP, HSV, Histoplasma neg  - Fungal & bacterial cultures NGTD  - EBV, CMV, PJP, Aspergillus, Legionella, Mycoplasma, AFB in process  - Bcx 3/24 NGTD    Chronic issues    # A-flutter (recurrent on 3/17)  # A-fib, intermittent  # HTN  # HFpEF  Noted Afib/flutter intermittently throughout admission. Was started on amiodarone bolus with drip and transitioned to PO dosing.   TTE: LVEF 55-60%, global RV function normal, no significant valvular  abnormalities. Briefly required levophed and midodrine for HD but otherwise stable off pressors.   - MAP goal > 65 mmHg   - Continue Metoprolol tartrate dose 25 mg BID (hold parameters if MAP<65 or hr less than 60, hold on dialysis days)  - Continue amiodarone 200 mg PO  - Midodrine with HD as needed  - Continues Apixaban     # Hypothyroidism  Patient with new (2/17/24) low T4 at 0.64 and TSH at 6.5, concerning for new hypothyroidism vs sick thyroid syndrome. TSH with appropriate response, more consistent with elevation in the setting of acute illness. ICU team on 2/28 recommended initiation of treatment for possible hypothyroid as this can improve respiratory muscle function in the setting of weakness and malnutrition. 3/7, 3/15, 3/20 repeat thyroid function WNL.  - Continue liothyronine + levothyroxine -- note that prolonged combination therapy is not optimal & regimen should be re-evaluated (likely stop liothyronine, up-titrate levothyroxine) in post-acute setting    # Hx of Anxiety   # Claustrophobia  Reports claustrophobia contributing to limited compliance with BiPAP. Has seen health psych on 3/20, recommended grounding exercise (5-4-3-2-1) for use on BiPAP.   - Zyprexa 5mg at bedtime   - Atarax 25 mg TID PRN for anxiety  - Clonidine 0.1 mg at bedtime    # ESRD on HD TTS   # Mild hyperphosphatemia  Patient is ESRD on T/Th/Sat HD as outpt, now approx M/W/F inpatient. HD tolerating until 3/23. Briefly required extra Monday runs, not since being off TPN. She would prefer longer sessions to more days.  - Midodrine 10mg BID PRN with dialysis days   - Increase bicarb bath during dialysis   - Sevelamer per Nephrology   - CBC and CMP daily  - Strict I/Os  - Daily weight   - Renally adjust medications     #Acute on chronic anemia 2/2 ESRD  Hgb stable, with no acute signs of bleeding.   - Daily CBC  - Transfuse for Hgb < 7  - Continue Epogen with dialysis, held in setting of concern for acute infection   - Continue  Venofer 50 mcg qweek with dialysis, held in setting of concern for acute infection    # Suspected CARLEE  # PTA nocturnal O2, 2L   - Sleep clinic eval at discharge for suspected CARLEE    # GERD  - PPI BID    # Hyperglycemia, stress induced  Has been euglycemic for the past few days. Not on insulin.  - Hypoglycemia protocol     Diet: Renal Diet (dialysis)  Adult Formula Drip Feeding: Specified Time Eneida Stoll Renal Support; Jejunostomy; Goal Rate: 40 mL/hr x 18 hours daily (2396-6993); mL/hr; From: 1:00 PM; To: 7:00 AM  Snacks/Supplements Adult: Nepro Oral Supplement; With Meals    DVT Prophylaxis: DOAC  Dong Catheter: Not present  Fluids: None  Lines: None       Cardiac Monitoring: None  Code Status: Full Code      Clinically Significant Risk Factors              # Hypoalbuminemia: Lowest albumin = 2.6 g/dL at 2/18/2024  5:13 AM, will monitor as appropriate               # Severe Malnutrition: based on nutrition assessment    # Financial/Environmental Concerns: none         Disposition Plan   Pending medical stability and home BIPAP/AVAP coordination     The patient's care was discussed with the Attending Physician, Dr. Arita .    DELFIN LEDESMA MD  Medicine Service, [facility] TEAM [address] [place] [address]  Securely message with [employer] (more info)  Text page via [facility] Paging/Directory   See signed in provider for up to date coverage information  ______________________________________________________________________    Interval History   No acute events overnight.  Patient notes that she was able to tolerate her home AVAPS much better overnight.  She was pleased with her lab results today.  Patient had concerns over granulomatous tissue under PEG-J site.  She denied that it was tender and notes it has improved in size. She noted that silver nitrate was applied over this tissue by the radiologist previously during her PEGJ exchange. She was wondering about further cares.     Physical Exam    Vital Signs: Temp: 98.9  F (37.2  C) Temp src: Oral BP: 94/54 Pulse: 63   Resp: 16 SpO2: 100 % O2 Device: None (Room air)    Weight: 88 lbs 10 oz    General: Awake, alert & oriented. Frail appearing. Sitting in bed   HEENT: Mucous membranes moist  Neuro: Awake and alert, no focal deficits, moving all extremities to command and spontaneously  Pulm/Resp: Clear breath sounds bilaterally, diminished on R>L, no accessory muscle use  CV: RRR, S1/S2 without m/r/g; pedal pulses 2+ without LE edema  Abdomen: Soft, non-distended, non-tender, PEGJ in place. Non-tender granulomatous tissue at PEGJ site without bleeding or drainage.  : Rectal tube in place, (+) bruit/thrill in LUE fistula  Incisions/Skin: PICC and PEG site without surrounding erythema, no rashes noted    Medical Decision Making       Please see A&P for additional details of medical decision making.      Data   ------------------------- PAST 24 HR DATA REVIEWED -----------------------------------------------    I have personally reviewed the following data over the past 24 hrs:    6.5  \   11.0 (L)   / 209     137 96 (L) 51.5 (H) /  94   4.1 27 3.73 (H) \     INR:  1.15 PTT:  N/A   D-dimer:  N/A Fibrinogen:  N/A       Imaging results reviewed over the past 24 hrs:   No results found for this or any previous visit (from the past 24 hour(s)).

## 2024-04-14 NOTE — PROGRESS NOTES
Pulmonary Medicine  Cystic Fibrosis - Lung Transplant Team  Progress Note  2024     Patient: Sofie Rodriguez  MRN: [id number]  : 1960 (age 63 year old)  Transplant: 2022 (Lung), POD#656  Admission date: 2/10/2024    Assessment & Plan:     Sofie Rodriguez is a 63 year old female with h/o COPD s/p BSLT () with course complicated by post-operative hemidiaphragm palsy, recurrent PNAs, positive DSA, EBV viremia, hypogammaglobulinemia, severe gastroparesis s/p G/J tube placement (22) and pyloric botox (23), GI bleed /2 pyloric ulcer, hemobilia s/p ERCP and MRCP, chronic diarrhea, recurrent C diff colitis, ESRD on iHD, recurrent falls with injuries (recent right hip fx s/p ORIF 2023), deconditioning, and FTT.  Admitted 2/10 for failure to thrive and initiation of TPN/lipids.  Emergent intubation - for hypoxic and hypercapneic respiratory failure and encephalopathy. Returned to ICU - and again 3/18-3/20 d/t tenuous respiratory status complicated by variable daytime NIPPV tolerance and hypervolemia with iHD limited d/t hypotension. Again transferred back to ICU 3/24 for intubation and bronch/BAL given hypoxic and hypercapneic respiratory failure. CT with extensive bilateral infiltrate and etiology likely multifactorial including volume overload and infection, possible mucous plugging, ACR or AMR less likely.  Extubated , no daytime hypoxia and hypercapnia remains stable with good adherence to NIPPV with sleep.  Tolerating PO, diarrhea more stable.  Progress acidosis and hypercapnia 4/3- despite excellent adherence to overnight NIPPV and adjustments in settings, eventually improved and stabilized with bicarbonate bath adjustment per nephrology, still without daytime hypoxia.  Discharge to home tomorrow with home NIPPV device.       Today's recommendations:  - Daily morning VBG to continue  - Home AVAPS overnight and with naps, do not recommend reverting to hospital  device as this will not be used after discharge (and pt. not known to acutely decompensate overnight with suboptimal use of NIPPV)  - CSA level ordered 4/15  - CMV, DSA, and EBV monthly due 4/23  - Supplemental shakes TID-QID to meet PO nutritional goals, encourage consistent use  - Pulmonary and transplant-related discharge medications ordered for you today, primary team to please order remaining discharge medications today (one day prior to discharge)     Acute on chronic hypoxic/hypercapneic respiratory failure:  S/p bilateral sequential lung transplant for COPD:   Hypoventilation, Suspected CARLEE:  PFTs in clinic remained significantly below her baseline.  Baseline hypoxia with 2L NC overnight PTA.  S/p intubation 2/17-2/19 for acute hypoxic/hypercapneic respiratory failure with encephalopathy, CT with concern for infection and pulmonary edema given recent addition of TPN nutrition.  Bronch (2/18) with very friable tissue, cutlures only with C. glabrata.  Persistent respiratory failure also complicated by hypoventilation and deconditioning d/t malnutrition. Neurology consulted 2/27, MRI brain (3/1) without acute intracranial pathology.  SNIFF (3/8) normal.  Metabolic CART suggested overfeeding on TPN.  Returned to ICU (3/18-3/20) d/t tenuous respiratory status complicated by NIPPV intolerance and hypervolemia with iHD limited d/t hypotension (CXR with increased pulmonary edema).  Overall, her PCO2 retention is likely multifactorial with a component being from overfeeding (though remedied with nutrition adjustment), metabolic compensation barrier d/t renal failure, pulmonary edema, bicarb loss with diarrhea, hypoventilation with declining NIF and FVC concerning for neuromuscular etiology (though Neurology consult was not revealing), and NIPPV intolerance due to claustrophobia. Worsening hypoxic and hypercapneic respiratory failure 3/24 and transferred to ICU for intubation. CT chest with extensive bilateral  infiltrates markedly increased from previous.  Bronch with small L>R sided secretions easily suctioned, BAL with no evidence of DAH, non bloody.  S/p Zosyn (3/23-4/2) for 10 day empiric course.  Extubated 4/2, hypercapnia stable with consistent overnight BiPAP use.  CXR (4/8) with stable bibasilar opacities and small left pleural effusion.  Progressive hypercapnia and acidosis despite transition from BiPAP to AVAPS (due to decreasing TV) and gradual increase in AVAPS TV, pt with excellent adherence to use overnight and with naps. Improved with bicarbonate bath adjustment per nephrology.  - Daily morning VBG to continue  - Overnight and with nap: consistent use of AVAPS (using home long-term device while inpatient since 4/9, do not recommend reverting to hospital device as this will not be used after discharge and pt. not known to acutely decompensate overnight with suboptimal use of NIPPV)) with  ml and max pressure 25  - Pulmonary f/u as OP ~2 weeks after discharge     Immunosuppression:  AZA previously stopped 5/2023 d/t low ImmuKnow assay and EBV viremia.  ImmuKnow (2/27) remained low at 88.  ImmuKnow (4/1) further decreased to 43.  Transitioned from [place] to CSA 3/11.  -  mg BID (increased 4/9 and transitioned to PO).  Goal 120-140 (decreased 4/4 for ImmuKnow).  Repeat level 4/15 (ordered).  - Prednisone 5 mg daily     Prophylaxis:   - Bactrim qMWF for PJP ppx  - CMV ppx not currently indicated, D+/R+, CMV negative 3/18, repeat CMV monthly (due 4/23)     Positive DSA: AMR treatment deferred given frailty and stable PFTs.  DSA DQB2 decreased on 3/6 with 5667 mfi, and again decreased to 4960 on 3/23.  Most recent cell-free DNA (2/18) mildly elevated at 1.04 (concerning for possible rejection), which was increased from prior level of 0.12 (1/10).  IST increase deferred at that time per Dr. Gong.  S/p IVIG (2/27) for DSA (IgG WNL).  Immuknow as above.  Prospera (cell free DNA) 0.44 (3/18) suggests AMR  less likely, follow  - Repeat DSA monthly (due 4/23)      EBV viremia: CT CAP (2/7) without lymphadenopathy.  Recent levels improving (3/18) 23k.  - Repeat EBV due 4/23     Other relevant problems being managed by the primary team:      FTT:  Severe protein calorie malnutrition:  Gastroparesis s/p PEG/J, botox, and G-POEM:  SB hypomotility:  Pyloric ulcer:  Chronic nausea and osmotic diarrhea:  SIBO s/p rifaximin:   Recurrent C diff colitis: Chronic osmotic and infectious diarrhea since transplant with recurrent episodes of C diff.  Notable weight loss (40# in a year) d/t diarrhea, GI dysmotility, and intolerance of enteral feeds (PEG/J tube in place), most recently on elemental formula.  Extensive OP eval and f/u with GI. S/p port placement for TPN and lipids. Continued for ~5 weeks inpatient without considerable improvement, transitioned back to TF.  C diff positive (3/13), on fidaxomicin.  Repeat (4/4) negative, enteric B/V panel also negative.  Loose stools persist, GI consulted 4/4.  Colonoscopy recommended by GI this admission, but pt. deferred d/t risk for reintubation and improvement in diarrhea today.  - Tube feeds per RD  - Supplemental shakes TID-QID to meet PO nutritional goals     ESRD: CT with volume overload secondary to TPN volume, iHD increased from PTA TThSa schedule with unsustained improvement.  Management per Nephrology, dialysis via [facility] C[facility].  Unable to remove fluid on 3/23 d/t hypotension, tolerance now improved with midodrine on HD days.  Volume removal and dialysis per nephrology.     Goals of care: Care conference held with palliative and primary team on 3/15 for medical update with pt. and daughters.  Repeat care conference 3/29 (see palliative and MICU notes) patient would like to proceed with re-intubation and trach if pt. fails extubation d/t progressive hypercapnia, understanding that this would severely complicate potential disposition options.     We appreciate the excellent care  provided by the [facility] team.  Recommendations communicated via this note.  Will continue to follow along closely, please do not hesitate to call with any questions or concerns.     Patient discussed with Dr. Huff.    Catherine Johnson, DNP, APRN, CNP  Inpatient Nurse Practitioner  Pulmonary CF/Transplant     Subjective & Interval History:     Home AVAPS mask adjusted last night per RT with less leaking and return of overnight use consistently by pt.  Also with HD run yesterday.  VBG remains good this morning.  No new cough or sputum.    Review of Systems:     C: No fever, no chills  INTEGUMENTARY/SKIN: No rash or obvious new lesions  ENT/MOUTH: No nasal congestion or drainage  RESP: See interval history  CV: No chest pain, no palpitations, no peripheral edema, no orthopnea  GI: No nausea, no vomiting, no change in stools, no reflux symptoms  : Oliguria  MUSCULOSKELETAL: No myalgias, no arthralgias  ENDOCRINE: Blood sugars with adequate control  NEURO: No headache  PSYCHIATRIC: Mood stable    Physical Exam:     All notes, images, and labs from past 24 hours (at minimum) were reviewed.    Vital signs:  Temp: 98.9  F (37.2  C) Temp src: Oral BP: 108/62 Pulse: 66   Resp: 16 SpO2: 100 % O2 Device: None (Room air) Oxygen Delivery: 2 LPM   Weight: 40.2 kg (88 lb 10 oz)  I/O:   Intake/Output Summary (Last 24 hours) at 4/14/2024 0744  Last data filed at 4/14/2024 0700  Gross per 24 hour   Intake 1590 ml   Output 2550 ml   Net -960 ml     Constitutional: Standing at sink, thin appearing, in no apparent distress.   HEENT: Eyes with pink conjunctivae, anicteric.  Oral mucosa moist without lesions.   PULM: Non-labored breathing on RA.  CV: No peripheral edema.   ABD: Nondistended.  PEG/J tube site not visualized.  MSK: Moves all extremities.  + muscle wasting.   NEURO: Alert and conversant.   SKIN: Warm, dry, fragile, scattered ecchymosis.   PSYCH: Mood stable.     Data:     LABS    CMP:   Recent Labs   Lab  "04/14/24  0520 04/13/24  0643 04/12/24 0447 04/11/24  1622 04/11/24 0443 04/10/24  0621 04/09/24  0710 04/08/24  0438    138 138  --  136 137 141 137   POTASSIUM 4.1 3.6 3.6  --  3.9 3.8 4.8 5.3   CHLORIDE 96* 93* 94*  --  90* 90* 97* 96*   CO2 27 29 31*  --  30* 32* 26 26   ANIONGAP 14 16* 13  --  16* 15 18* 15   GLC 94 181* 96 163* 107* 112* 84 84   BUN 51.5* 81.6* 40.2*  --  76.8* 50.2* 100.0* 80.3*   CR 3.73* 5.25* 3.21*  --  5.36* 3.76* 6.59* 4.99*   GFRESTIMATED 13* 9* 16*  --  9* 13* 7* 9*   ESTUARDO 9.0 8.9 8.9  --  9.0 8.9 9.1 9.6   MAG 1.7 1.9 1.7  --  1.9 1.8 2.3 2.2   PHOS 4.7* 5.6* 3.8  --  5.7* 4.6* 6.0* 6.1*   PROTTOTAL  --   --   --   --  5.6* 6.0* 5.8* 6.1*   ALBUMIN  --   --   --   --  3.5 3.7 3.5 3.8   BILITOTAL  --   --   --   --  0.3 0.2 0.2 0.2   ALKPHOS  --   --   --   --  133 113 118 106   AST  --   --   --   --  18 18 16 13   ALT  --   --   --   --  11 9 10 8     CBC:   Recent Labs   Lab 04/14/24  0520 04/12/24  0447 04/11/24  0443 04/10/24  0621   WBC 6.5 5.3 5.5 5.7   RBC 3.21* 3.04* 2.78* 3.00*   HGB 11.0* 10.6* 9.4* 10.3*   HCT 35.8 33.7* 30.3* 33.3*   * 111* 109* 111*   MCH 34.3* 34.9* 33.8* 34.3*   MCHC 30.7* 31.5 31.0* 30.9*   RDW 17.2* 17.3* 17.3* 17.9*    209 214 235       INR:   Recent Labs   Lab 04/14/24  0520 04/13/24  0643 04/12/24  0447 04/11/24 0443   INR 1.15 1.10 1.09 1.08       Glucose:   Recent Labs   Lab 04/14/24  0520 04/13/24  0643 04/12/24  0447 04/11/24  1622 04/11/24  0443 04/10/24  0621   GLC 94 181* 96 163* 107* 112*       Blood Gas:   Recent Labs   Lab 04/14/24 0520 04/13/24  0643 04/12/24 0447   PHV 7.33 7.29* 7.38   PCO2V 59* 68* 63*   PO2V 49* 83* 37   HCO3V 31* 33* 37*   LINDY 3.9* 4.7* 9.8*   O2PER 21 2 23       Culture Data No results for input(s): \"CULT\" in the last 168 hours.    Virology Data:   Lab Results   Component Value Date    FLUAH1 Not Detected 03/24/2024    FLUAH3 Not Detected 03/24/2024    TE3478 Not Detected 03/24/2024    IFLUB " Not Detected 03/24/2024    RSVA Not Detected 03/24/2024    RSVB Not Detected 03/24/2024    PIV1 Not Detected 03/24/2024    PIV2 Not Detected 03/24/2024    PIV3 Not Detected 03/24/2024    HMPV Not Detected 03/24/2024       Historical CMV results (last 3 of prior testing):  Lab Results   Component Value Date    CMVQNT Not Detected 03/18/2024    CMVQNT Not Detected 02/19/2024    CMVQNT Not Detected 02/07/2024     Lab Results   Component Value Date    CMVLOG 3.2 07/12/2023    CMVLOG <2.1 04/19/2023    CMVLOG 3.5 01/25/2023       Urine Studies    Recent Labs   Lab Test 02/18/24  0222 05/18/23  0627   URINEPH 7.5* 5.0   NITRITE Negative Negative   LEUKEST Trace* Moderate*   WBCU 66* 21*       Most Recent [place] Pulmonary Function Testing (FVC/FEV1 only)  FVC-Pre   Date Value Ref Range Status   02/07/2024 1.19 L    01/10/2024 1.12 L    08/29/2023 1.48 L    07/25/2023 1.55 L      FVC-%Pred-Pre   Date Value Ref Range Status   02/07/2024 42 %    01/10/2024 39 %    08/29/2023 53 %    07/25/2023 55 %      FEV1-Pre   Date Value Ref Range Status   02/07/2024 1.13 L    01/10/2024 1.10 L    08/29/2023 1.43 L    07/25/2023 1.54 L      FEV1-%Pred-Pre   Date Value Ref Range Status   02/07/2024 51 %    01/10/2024 49 %    08/29/2023 64 %    07/25/2023 69 %        IMAGING    No results found for this or any previous visit (from the past 48 hour(s)).

## 2024-04-14 NOTE — PROGRESS NOTES
RT consulted pt on home ventilator for sleep at night. RT helped adjust mask and pt seem content with result. Pt was determined to stay on home ventilator and refused v60(BIPAP) ventilator. RT is unfamiliar with pt's home ventilator but upon assessment, the pt is receiving an average Vte in the 1500-2000s mL. Pt was advise v60 is readily available if pt changes their mind.    Emily Hsu, RT

## 2024-04-15 VITALS
SYSTOLIC BLOOD PRESSURE: 101 MMHG | HEIGHT: 62 IN | HEART RATE: 58 BPM | OXYGEN SATURATION: 100 % | RESPIRATION RATE: 17 BRPM | TEMPERATURE: 98.5 F | BODY MASS INDEX: 16.31 KG/M2 | DIASTOLIC BLOOD PRESSURE: 63 MMHG | WEIGHT: 88.63 LBS

## 2024-04-15 DIAGNOSIS — Z94.2 LUNG REPLACED BY TRANSPLANT (H): ICD-10-CM

## 2024-04-15 DIAGNOSIS — Z79.899 ENCOUNTER FOR LONG-TERM (CURRENT) USE OF HIGH-RISK MEDICATION: Primary | ICD-10-CM

## 2024-04-15 LAB
ANION GAP SERPL CALCULATED.3IONS-SCNC: 18 MMOL/L (ref 7–15)
BASE EXCESS BLDV CALC-SCNC: 2.4 MMOL/L (ref -3–3)
BUN SERPL-MCNC: 82.9 MG/DL (ref 8–23)
CALCIUM SERPL-MCNC: 8.9 MG/DL (ref 8.8–10.2)
CHLORIDE SERPL-SCNC: 95 MMOL/L (ref 98–107)
CREAT SERPL-MCNC: 5.6 MG/DL (ref 0.51–0.95)
CYCLOSPORINE BLD LC/MS/MS-MCNC: 125 UG/L (ref 50–400)
DEPRECATED HCO3 PLAS-SCNC: 25 MMOL/L (ref 22–29)
EGFRCR SERPLBLD CKD-EPI 2021: 8 ML/MIN/1.73M2
ERYTHROCYTE [DISTWIDTH] IN BLOOD BY AUTOMATED COUNT: 17.3 % (ref 10–15)
GLUCOSE SERPL-MCNC: 116 MG/DL (ref 70–99)
HCO3 BLDV-SCNC: 29 MMOL/L (ref 21–28)
HCT VFR BLD AUTO: 32.3 % (ref 35–47)
HGB BLD-MCNC: 10.2 G/DL (ref 11.7–15.7)
INR PPP: 1.09 (ref 0.85–1.15)
MAGNESIUM SERPL-MCNC: 1.9 MG/DL (ref 1.7–2.3)
MCH RBC QN AUTO: 34.8 PG (ref 26.5–33)
MCHC RBC AUTO-ENTMCNC: 31.6 G/DL (ref 31.5–36.5)
MCV RBC AUTO: 110 FL (ref 78–100)
O2/TOTAL GAS SETTING VFR VENT: 40 %
OXYHGB MFR BLDV: 83 % (ref 70–75)
PCO2 BLDV: 57 MM HG (ref 40–50)
PH BLDV: 7.32 [PH] (ref 7.32–7.43)
PHOSPHATE SERPL-MCNC: 6.1 MG/DL (ref 2.5–4.5)
PLATELET # BLD AUTO: 198 10E3/UL (ref 150–450)
PO2 BLDV: 54 MM HG (ref 25–47)
POTASSIUM SERPL-SCNC: 4.6 MMOL/L (ref 3.4–5.3)
RBC # BLD AUTO: 2.93 10E6/UL (ref 3.8–5.2)
SAO2 % BLDV: 84.1 % (ref 70–75)
SODIUM SERPL-SCNC: 138 MMOL/L (ref 135–145)
TME LAST DOSE: NORMAL H
TME LAST DOSE: NORMAL H
WBC # BLD AUTO: 6.8 10E3/UL (ref 4–11)

## 2024-04-15 PROCEDURE — 80158 DRUG ASSAY CYCLOSPORINE: CPT | Performed by: NURSE PRACTITIONER

## 2024-04-15 PROCEDURE — 82805 BLOOD GASES W/O2 SATURATION: CPT | Performed by: NURSE PRACTITIONER

## 2024-04-15 PROCEDURE — 250N000013 HC RX MED GY IP 250 OP 250 PS 637: Performed by: STUDENT IN AN ORGANIZED HEALTH CARE EDUCATION/TRAINING PROGRAM

## 2024-04-15 PROCEDURE — 250N000013 HC RX MED GY IP 250 OP 250 PS 637

## 2024-04-15 PROCEDURE — 85610 PROTHROMBIN TIME: CPT | Performed by: STUDENT IN AN ORGANIZED HEALTH CARE EDUCATION/TRAINING PROGRAM

## 2024-04-15 PROCEDURE — 36415 COLL VENOUS BLD VENIPUNCTURE: CPT | Performed by: NURSE PRACTITIONER

## 2024-04-15 PROCEDURE — 80048 BASIC METABOLIC PNL TOTAL CA: CPT

## 2024-04-15 PROCEDURE — 83735 ASSAY OF MAGNESIUM: CPT | Performed by: STUDENT IN AN ORGANIZED HEALTH CARE EDUCATION/TRAINING PROGRAM

## 2024-04-15 PROCEDURE — 99239 HOSP IP/OBS DSCHRG MGMT >30: CPT | Mod: GC | Performed by: STUDENT IN AN ORGANIZED HEALTH CARE EDUCATION/TRAINING PROGRAM

## 2024-04-15 PROCEDURE — 84100 ASSAY OF PHOSPHORUS: CPT | Performed by: STUDENT IN AN ORGANIZED HEALTH CARE EDUCATION/TRAINING PROGRAM

## 2024-04-15 PROCEDURE — 85014 HEMATOCRIT: CPT

## 2024-04-15 PROCEDURE — 99232 SBSQ HOSP IP/OBS MODERATE 35: CPT | Performed by: NURSE PRACTITIONER

## 2024-04-15 PROCEDURE — 250N000012 HC RX MED GY IP 250 OP 636 PS 637: Performed by: NURSE PRACTITIONER

## 2024-04-15 PROCEDURE — 250N000012 HC RX MED GY IP 250 OP 636 PS 637

## 2024-04-15 PROCEDURE — 999N000157 HC STATISTIC RCP TIME EA 10 MIN

## 2024-04-15 RX ADMIN — Medication 40 MG: at 07:52

## 2024-04-15 RX ADMIN — PREDNISONE 5 MG: 5 TABLET ORAL at 07:53

## 2024-04-15 RX ADMIN — CALCIUM CARBONATE 600 MG (1,500 MG)-VITAMIN D3 400 UNIT TABLET 1 TABLET: at 12:02

## 2024-04-15 RX ADMIN — Medication 2.5 MCG: at 07:55

## 2024-04-15 RX ADMIN — APIXABAN 2.5 MG: 2.5 TABLET, FILM COATED ORAL at 07:55

## 2024-04-15 RX ADMIN — AMIODARONE HYDROCHLORIDE 200 MG: 200 TABLET ORAL at 07:55

## 2024-04-15 RX ADMIN — LEVOTHYROXINE SODIUM 25 MCG: 0.03 TABLET ORAL at 07:55

## 2024-04-15 RX ADMIN — CYCLOSPORINE 125 MG: 25 CAPSULE, LIQUID FILLED ORAL at 07:52

## 2024-04-15 RX ADMIN — SEVELAMER CARBONATE 0.8 G: 800 POWDER, FOR SUSPENSION ORAL at 07:55

## 2024-04-15 RX ADMIN — CALCIUM CARBONATE 600 MG (1,500 MG)-VITAMIN D3 400 UNIT TABLET 1 TABLET: at 07:53

## 2024-04-15 RX ADMIN — METOPROLOL TARTRATE 25 MG: 25 TABLET, FILM COATED ORAL at 07:54

## 2024-04-15 ASSESSMENT — ACTIVITIES OF DAILY LIVING (ADL)
ADLS_ACUITY_SCORE: 28

## 2024-04-15 NOTE — PROGRESS NOTES
Care Management Discharge Note    Discharge Date: 04/15/2024    Discharge Disposition: Home    Discharge Services: Home infusion, OP HD, OP pulm rehab    Discharge DME: Bipap    Discharge Transportation: health plan transportation    Private pay costs discussed: Not applicable    Does the patient's insurance plan have a 3 day qualifying hospital stay waiver?  No    PAS Confirmation Code:  NA  Patient/family educated on Medicare website which has current facility and service quality ratings:  NA    Education Provided on the Discharge Plan: Yes  Persons Notified of Discharge Plans: Home infusion, OP HD  Patient/Family in Agreement with the Plan: Yes    Handoff Referral Completed: Yes    Additional Information:  Per update from the primary team, they anticipate the patient will be medically ready for discharge today, awaiting final confirmation.     Writer met w/patient to discuss discharge planning. Patient confirms that her transportation company will be able to provide a ride at time of discharge today, they are waiting on the final discharge order before they confirm and send a . Patient has arranged her transportation for OP HD appointments.     [facility] updated on anticipated discharge, writer requested a liaison assist w/troubleshooting the frequent alarm patient is experiencing on her home pump. Resumption orders placed.     Patient will bring her home bipap w/her, writer assisted patient w/powering down the device.     VM received from OP pulm rehab w[facility], fax number received and referral sent, patient updated. Discharge AVS updated.     [facility] updated on discharge. RNCC will fax discharge orders/summary to OP HD when completed. No additional discharge needs noted.     1410 Addendum:  OP HD orders faxed by nephrology PA.     1546 Addendum:  OP pulm rehab referral [facility] to [facility] at this time.     Discharge resources:     [facility]  (home enteral feedings)  Phone: [phone]  Fax: [phone]      [facility]   Chair time 5am T/TH/Sat  Phone: [phone]  Fax: [phone]     [facility] (patient will arrange OP HD rides and discharge ride)  Phone: [phone]     [employer] (home O2 and bipap)  Phone: [phone]  Fax: [phone]   Rep, [place]: [phone]     [facility] (Outpatient pulm rehab)  Phone: [phone], ext. [phone].  Fax: [phone]  Address: [address] [place] [address]    Care coordination will continue to follow.     Lacey Pringle, RNCC, BSN    [facility]    [address]  [address] [place] [address]    [email or web address]  [email or web address]    Office: [phone] Pager: [phone]

## 2024-04-15 NOTE — PLAN OF CARE
"Goal Outcome Evaluation:  4/14 1900 to 4/15 0700      /63 (BP Location: Right arm, Cuff Size: Adult Small)   Pulse 94   Temp 97.9  F (36.6  C) (Oral)   Resp 19   Ht 1.57 m (5' 1.81\")   Wt 40.2 kg (88 lb 10 oz)   SpO2 100%   BMI 16.31 kg/m            Neuro: A&Ox4.   Cardiac: VSS.  Sys 90-100s. MAPS >65.  Afebrile.  Respiratory: Sating >92 on RA and awake.  Pt is using AVAPs machine (personal) overnight - adjusting to it well and sating >92.    GI/: Oliguric, Pt on HD.  1 BM on shift.    Diet/appetite: Tolerating Reg diet. Eating well.  Activity:  IND, up to commode.    Pain: At acceptable level on current regimen.   Skin: No new deficits noted. - Pink granulomatous tissue on PEGJ site - Team had IR consulted.    LDA's:  Left fistula, 1 Right PIV - SL.      Plan: Continue with POC. Notify primary team with changes.  Plan for discharge Monday (today).  Check w/ team and/or IR if IR is coming to look at the graulomatous tissue around PEGJ.               "

## 2024-04-15 NOTE — PLAN OF CARE
DISCHARGE                         No discharge date for patient encounter.  ----------------------------------------------------------------------------  Discharged to: Home  Via: private transportation  Accompanied by: Family  Discharge Instructions: Renal diet, activity as tolerated, medications, follow up appointments, when to call the MD, aftercare instructions.  Prescriptions: Filled at discharge pharmacy; medication list reviewed & sent with pt  Follow Up Appointments: arranged; information given  Belongings: All sent with pt  IV: d/c'd  Telemetry: d/c'd  Pt exhibits understanding of above discharge instructions; all questions answered.    Discharge Paperwork: Signed, copied, and sent home with patient.

## 2024-04-15 NOTE — DISCHARGE SUMMARY
[facility]  Discharge Summary - Medicine & Pediatrics       Date of Admission:  2/10/2024  Date of Discharge:  4/15/2024  Discharging Provider: Beni Mccullough MD   Discharge Service: Medicine Service, [facility] TEAM 3    Discharge Diagnoses   Acute on chronic hypoxic and hypercarbic respiratory failure requiring intubation 2/17-2/19 3/25-4/2, improved  Recurrent PNAs, concern for acute infection vs acute rejection  S/p BSLT 6/8/22 for COPD  R hemidiaphragm palsy   Positive DSA   Respiratory acidosis, Stable  Goals of Care  Chronic osmotic diarrhea/SIBO s/p Rifaximin  Recurrent C.Diff (3rd ep 3/12/24)    Diarrhea, resolved  Severe malnutrition   Failure to thrive  Hypoalbuminemia  Gastroparesis, small bowel dysmotility  S/P PEG/J with intolerance of enteral nutrition  Recurrent pneumonia  Recurrent C.Diff colitis (3rd ep 3/12/24)  EBV viremia   Hypogammaglobulinemia  Chronic immunosuppression 2/2 lung transplant  A-flutter (recurrent on 3/17)  A-fib, intermittent  HTN  HFpEF  Hypothyroidism  Anxiety   Claustrophobia  ESRD on HD TTS   Mild hyperphosphatemia  Acute on chronic anemia 2/2 ESRD  Suspected CARLEE   GERD    Clinically Significant Risk Factors     # Severe Malnutrition: based on nutrition assessment      Follow-ups Needed After Discharge   Follow-up Appointments     Adult [facility] Follow-up and recommended labs and tests      Follow up with primary care provider, Vinny Berrios, within 7 days   for hospital follow- up.  No follow up labs or test are needed.    Follow up with your transplant pulmonary team as directed.    Appointments on [place] and/or [facility] (with [facility] or [facility]   provider or service). Call [phone] if you haven't heard regarding   these appointments within 7 days of discharge.            Items for follow up  - Follow up anxiety medications and need for them while on AVAPS with PCP, specifically clonidine as BP has been softer 100-110s  SBP   - Follow up liothyronine + levothyroxine regimen should be re-evaluated (likely stop liothyronine, up-titrate levothyroxine) in post-hospitalization   - Follow up tolerance to AVAPS and VBG     Unresulted Labs Ordered in the Past 30 Days of this Admission       Date and Time Order Name Status Description    3/24/2024  1:52 PM Fungus Culture, non-blood - BAL Site 1 Preliminary     3/24/2024  1:52 PM Acid-Fast Bacilli Culture and Stain In process             Discharge Disposition   Discharged to home  Condition at discharge: Stable    Hospital Course   Sofie Rodriguez is a 63 year old female with PMH COPD 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 Course complicated by recurrent and progressive acute on chronic hypoxic and hypercarbic respiratory failure requiring multiple ICU admissions and intubation 2/18-2/19 and 2/28-2/29. 3/18-3/20, for continuous BiPAP. Again with worsening respiratory acidosis and hypercarbia, concern for infection vs acute rejection, requiring transfer back to ICU 3/20/2024 for intubation and bronchoscopy. Weaned off ventilator to RA and BIPAP at night. Transferred to medicine on 4/4/2024. Improved clinical status with adjustments to BiPAP, bicarb bath in HD, obtained a home AVAPs machine. Stable on home AVAPS with enough improvement to discharge on 4/15/2024.    Acute on chronic hypoxic and hypercarbic respiratory failure requiring intubation 2/17-2/19 3/25-4/2, improved  Recurrent PNAs, concern for acute infection vs acute rejection  S/p BSLT 6/8/22 for COPD  R hemidiaphragm palsy   Positive DSA    Respiratory acidosis, pH improved with high bicarb bath with HD on 4/9/2024   With a history of bilateral lung transplant on 6/28/2022 for COPD.  Multiple admissions to the ICU for hypoxic hypercarbic respiratory failure this admission.  Intubated on 2/18 - 2/19, and was extubated to BiPAP/AVAPS with transfer back to the floor.  Transferred back to ICU on 3/18 - 3/28 due to hypercarbia requiring continuous BiPAP.  Was intubated again on 3/20 for bronch given worsening respiratory status. Weaned off ventilator to RA and BIPAP at night on 4/2.  Etiology of persistent hypercarbic respiratory failure likely multifactorial; most notable factors including history of right hemidiaphragm palsy, overfeeding through TPN, bicarb loss through GI sources, intolerance of AVAPS d/t claustrophobia, and ESRD. Required increasing bicarb bath during dialysis prevent acidosis. Troubleshooting of mask for fit prior to discharge.   - Immunosuppression at discharge:   - Prednisone 5 mg every morning, 2.5 mg every afternoon  - Cyclosporine 100mg BID (Stopped PTA tacrolimus 3/10)   - iHD changes at discharge              - Increased bicarb bath with dialysis on 4/9/2024  - NIPPV settings at discharge              - AVAPS at Tv 375, 12 minimum mmHg, max 25, and EPAP 5  - BIPAP/AVAPS at hs and naps at all times. Ideally 7-8 hours overnight, limited by claustrophobia, medications as below  - Home BIPAP delivered from home  - Zyprexa 5mg at bedtime   - Atarax 25 mg TID PRN for anxiety  - Clonidine 0.1 mg at bedtime  - Anxiety improved at discharge, discussed patient to follow up with PCP to consider discontinuing anxiety medications     Goals of Care  Discussed several times during admission regarding escalation of cares to ICU and code status. Discharged at full code. Pt made the decision this hospitalization, that if needed down the line she would be acceptable of a tracheostomy     Chronic osmotic diarrhea/SIBO s/p Rifaximin  Recurrent  C.Diff (3rd ep 3/12/24)    Diarrhea, resolved  Has a history of recurrent C. Difficile s/p transplant.  Received fidaxomicin for 10 days from 3/13 - 3/22 with improvement in symptoms.  Later experienced recurrence of symptoms on 4/4/2024 with unremarkable workup.  Negative for C. difficile at that time.  Patient declined colonoscopy for further evaluation.  Was started on Imodium with improvement in symptoms.  -Continue loperamide 2 mg tablet as needed 4 times a day     Severe malnutrition   Failure to thrive  Hypoalbuminemia  Gastroparesis, small bowel dysmotility  S/P PEG/J with intolerance of enteral nutrition  Patient with gastroparesis (presumed due to vagal injury) and small bowel dysmotility complicated by unintentional 40lb weight loss over the past year and now severe malnutrition. TPN initiation initial reason for admission. Complicated by overfeeding and did not tolerate well. Concerned that it was contributing to hypercarbia. Transitioned to TF with nutrition assistance during admission. Was gradually transitioning to cyclic feeds and tolerating TF rates. PEGJ dislodged on 4/7/2024 and replaced with IR on 4/8/2024.   - TF and home infusion orders at discharge    - Calorie supplements at discharge  - Patient noted some granulomatous tissue around PEGJ site, improving per patient and non-tender. IR treated with silver nitrate, no further discharge cares needed      Recurrent pneumonia, concern for acute infection vs rejection  Recurrent C.Diff colitis (3rd ep 3/12/24)  Hx EBV viremia   Hypogammaglobulinemia  Chronic immunosuppression 2/2 lung transplant  Empirically treated for pneumonia 2/17 - 2/23, RVP and cultures no growth to date. Recurrent C.Diff Positive 3/12, s/p PO Fidaxomicin 200 mg BID for 10 days (3/13-3/22) with improvement in diarrhea. Leukocytosis resolved. Cultures and antibiotics during admission below.     Antibiotics:  Zosyn (2/17 - 2/23, 3/24 - 4/2)   Bactrim (MWF, PJP ppx, previously  on Dapsone)  Vancomycin (2/17 - 2/17, 3/24-3/25)  Micafungin (2/18- 2/21, 3/24 x1)  Fidaxomicin (3/13-3/22)     Micro:   - BAL 3/24:   - Cell count w diff: PMN 75%, lymphocytes 5, monocytes 19, eos 1  - No organisms seen on Gram stain  - RVP, HSV, Histoplasma neg  - Fungal & bacterial cultures NGTD  - EBV, CMV, PJP, Aspergillus, Legionella, Mycoplasma, AFB in unremarkable  - Bcx 3/24 NGTD      A-flutter (recurrent on 3/17)  A-fib, intermittent  HTN  HFpEF  Noted Afib/flutter intermittently throughout admission. Was started on amiodarone bolus with drip and transitioned to PO dosing.  2/17 TTE: LVEF 55-60%, global RV function normal, no significant valvular abnormalities. Briefly required levophed and midodrine for HD but otherwise stable off pressors.   - Continue Metoprolol tartrate dose 25 mg BID (hold parameters if MAP<65 or hr less than 60, hold on dialysis days)  - Started amiodarone 200 mg PO this admission, continue at discharge  - Midodrine 10mg with HD as needed  - Started Apixaban this admission, continue at discharge     Hypothyroidism  Patient with new (2/17/24) low T4 at 0.64 and TSH at 6.5, concerning for new hypothyroidism vs sick thyroid syndrome. TSH with appropriate response, more consistent with elevation in the setting of acute illness. ICU team on 2/28 recommended initiation of treatment for possible hypothyroid as this can improve respiratory muscle function in the setting of weakness and malnutrition. 3/7, 3/15, 3/20 repeat thyroid function WNL.  - Continue liothyronine + levothyroxine -- note that prolonged combination therapy is not optimal & regimen should be re-evaluated (likely stop liothyronine, up-titrate levothyroxine) in post-hospitalization follow up with PCP     Hx of Anxiety   Claustrophobia  Reports claustrophobia contributing to limited compliance with BiPAP. Has seen health psych on 3/20, recommended grounding exercise (5-4-3-2-1) for use on BiPAP. Experiencing less anxiety with  continued use. Discharged with anxiety medications, but advised patient to follow up with PCP to discontinue medications if no longer needing  - Zyprexa 5mg at bedtime   - Atarax 25 mg TID PRN for anxiety  - Clonidine 0.1 mg at bedtime     ESRD on HD TTS   Mild hyperphosphatemia  Patient is ESRD on T/Th/Sat HD as outpt. Continue TTS at discharge. Notably increased bicarb bath during dialysis to help support   - Midodrine 10mg BID PRN with dialysis days   - Increased bicarb bath during dialysis   - Continue Sevelamer     Acute on chronic anemia 2/2 ESRD  Hgb stable, with no acute signs of bleeding.   - Continue Epogen with dialysis, follow up with nephrologist to initiate  - Continue Venofer 50 mcg qweek with dialysis, follow up with nephrologist to initiate     Suspected CARLEE  PTA nocturnal O2, 2L   - Sleep clinic eval at discharge for suspected CARLEE     GERD  - Continue PPI BID    Consultations This Hospital Stay   NUTRITION SERVICES ADULT IP CONSULT  MEDICATION HISTORY IP PHARMACY CONSULT  NEPHROLOGY ESRD ADULT IP CONSULT  LUNG TRANSPLANT PROGRAM ADULT IP CONSULT  PHYSICAL THERAPY ADULT IP CONSULT  OCCUPATIONAL THERAPY ADULT IP CONSULT  CARE MANAGEMENT / SOCIAL WORK IP CONSULT  INTERVENTIONAL RADIOLOGY ADULT/PEDS IP CONSULT  PHARMACY/NUTRITION TO START AND MANAGE TPN  GI LUMINAL ADULT IP CONSULT  PHARMACY IP CONSULT  INTERVENTIONAL RADIOLOGY ADULT/PEDS IP CONSULT  PHARMACY IP CONSULT  LYMPHEDEMA THERAPY IP CONSULT  PHARMACY IP CONSULT  NUTRITION SERVICES ADULT IP CONSULT  PHARMACY IP CONSULT  PHARMACY IP CONSULT  PHARMACY IP CONSULT  PHYSICAL THERAPY ADULT IP CONSULT  OCCUPATIONAL THERAPY ADULT IP CONSULT  PHARMACY TO DOSE VANCO  PHARMACY IP CONSULT  NEPHROLOGY ESRD ADULT IP CONSULT  NUTRITION SERVICES ADULT IP CONSULT  PHYSICAL THERAPY ADULT IP CONSULT  OCCUPATIONAL THERAPY ADULT IP CONSULT  NUTRITION SERVICES ADULT IP CONSULT  PHARMACY/NUTRITION TO START AND MANAGE TPN  SPEECH LANGUAGE PATH ADULT IP  CONSULT  INFECTIOUS DISEASE TRANSPLANT SOT ADULT IP CONSULT  PHARMACY IP CONSULT  VASCULAR SURGERY ADULT IP CONSULT  PHARMACY IP CONSULT  NURSING TO CONSULT FOR VASCULAR ACCESS CARE IP CONSULT  NURSING TO CONSULT FOR VASCULAR ACCESS CARE IP CONSULT  NEUROLOGY GENERAL ADULT IP CONSULT  NURSING TO CONSULT FOR VASCULAR ACCESS CARE IP CONSULT  PHARMACY IP CONSULT  NURSING TO CONSULT FOR VASCULAR ACCESS CARE IP CONSULT  PALLIATIVE CARE ADULT IP CONSULT  LYMPHEDEMA THERAPY IP CONSULT  LYMPHEDEMA THERAPY IP CONSULT  PHARMACY IP CONSULT  VASCULAR ACCESS ADULT IP CONSULT  VASCULAR ACCESS FOR PICC PLACEMENT ADULT IP CONSULT  SOCIAL WORK IP CONSULT  SPIRITUAL HEALTH SERVICES IP CONSULT  INTERVENTIONAL RADIOLOGY ADULT/PEDS IP CONSULT  RESPIRATORY CARE IP CONSULT  PALLIATIVE CARE ADULT IP CONSULT  PHARMACY IP CONSULT  GI LUMINAL ADULT IP CONSULT  INFECTIOUS DISEASE GENERAL ADULT IP CONSULT  INFECTIOUS DISEASE GENERAL ADULT IP CONSULT  NUTRITION SERVICES ADULT IP CONSULT  PHARMACY IP CONSULT  NEUROLOGY GENERAL ADULT IP CONSULT  PHYSICAL THERAPY ADULT IP CONSULT  OCCUPATIONAL THERAPY ADULT IP CONSULT  PHYSICAL THERAPY ADULT IP CONSULT  OCCUPATIONAL THERAPY ADULT IP CONSULT  PSYCHOLOGY ADULT IP CONSULT  PSYCHOLOGY ADULT IP CONSULT  PHARMACY TO DOSE WARFARIN  SPIRITUAL HEALTH SERVICES IP CONSULT  PHARMACY TO DOSE VANCO  NURSING TO CONSULT FOR VASCULAR ACCESS CARE IP CONSULT  SPIRITUAL HEALTH SERVICES IP CONSULT  SPEECH LANGUAGE PATH ADULT IP CONSULT  CARE MANAGEMENT / SOCIAL WORK IP CONSULT  RESPIRATORY CARE IP CONSULT  GI LUMINAL ADULT IP CONSULT  INTERVENTIONAL RADIOLOGY ADULT/PEDS IP CONSULT  INTERVENTIONAL RADIOLOGY ADULT/PEDS IP CONSULT  NURSING TO CONSULT FOR VASCULAR ACCESS CARE IP CONSULT  NURSING TO CONSULT FOR VASCULAR ACCESS CARE IP CONSULT    Code Status   Full Code       The patient was discussed with Dr. Juan J LEDESMA MD  [address]  [address]  Phone: [phone]  ______________________________________________________________________    Physical Exam   Vital Signs: Temp: 98.5  F (36.9  C) Temp src: Oral BP: 101/63 Pulse: 58   Resp: 17 SpO2: 100 % O2 Device: None (Room air)    Weight: 88 lbs 10 oz  General: Awake, alert & oriented. Frail appearing. Sitting in bed   HEENT: Mucous membranes moist  Neuro: Awake and alert, no focal deficits, moving all extremities to command and spontaneously  Pulm/Resp: Clear breath sounds bilaterally, diminished on R>L, no accessory muscle use  CV: RRR, S1/S2 without m/r/g; pedal pulses 2+ without LE edema  Abdomen: Soft, non-distended, non-tender, PEGJ in place. Non-tender granulomatous tissue at PEGJ site without bleeding or drainage.  : Rectal tube in place, (+) bruit/thrill in LUE fistula  Incisions/Skin: PICC and PEG site without surrounding erythema, no rashes noted         Primary Care Physician   Vinny Berrios    Discharge Orders      Sleep DME    If providing a ResMed device for this [place] patient, please add [facility] as an Integrator in [facility] along with patient's MRN: [id number] and CSN: [id number].     Adult Sleep Eval & Management Referral      Home Infusion Referral      Medication Therapy Management Referral      Pulmonary Rehab  Referral      Reason for your hospital stay    Sofie, you were admitted initially for consideration of parenteral nutrition, however you had a very complicated hospital stay complicated by fluid overload, possible pneumonia, and recurrent hypercarbic respiratory failure which required multiple intubations in the ICU. Fortunately, with the input of transplant pulmonary team we were able to transition you to an AVAPs machine to help with your carbon dioxide levels overnight. The nutritionists were able to help us transition you to tube feeds via a PEG tube. It is essential that you continue to use the AVAPS/BiPAP machine overnight and with  any naps at home to prevent retention of carbon dioxide. You will resume dialysis which was also adjusted during your hospital stay to help optimize your breathing and acid/balance in the body as well. You should follow-up closely with your primary care doctor and the transplant pulmonary team.     Activity    Your activity upon discharge: activity as tolerated     Adult [facility] Follow-up and recommended labs and tests    Follow up with primary care provider, Vinny Berrios, within 7 days for hospital follow- up.  No follow up labs or test are needed.    Follow up with your transplant pulmonary team as directed.    Appointments on [place] and/or [facility] (with [facility] or [facility] provider or service). Call [phone] if you haven't heard regarding these appointments within 7 days of discharge.     Diet    Follow this diet upon discharge: Orders Placed This Encounter      Calorie Counts      Calorie Counts      Calorie Counts      Adult Formula Drip Feeding: Specified Time Eneida Stoll Renal Support; Jejunostomy; Goal Rate: 40 mL/hr x 18 hours daily (5511-2156); mL/hr; From: 1:00 PM; To: 7:00 AM      Snacks/Supplements Adult: Nepro Oral Supplement; With Meals      Renal Diet (dialysis)       Significant Results and Procedures   Refer to Results tab    Discharge Medications   Current Discharge Medication List        START taking these medications    Details   amiodarone (PACERONE) 200 MG tablet 1 tablet (200 mg) by Oral or Feeding Tube route daily  Qty: 30 tablet, Refills: 0    Associated Diagnoses: Paroxysmal A-fib (H)      apixaban ANTICOAGULANT (ELIQUIS) 2.5 MG tablet Take 1 tablet (2.5 mg) by mouth 2 times daily  Qty: 60 tablet, Refills: 0    Associated Diagnoses: Paroxysmal A-fib (H)      calcium carbonate-vitamin D (CALTRATE) 600-10 MG-MCG per tablet 1 tablet by Per J Tube route 3 times daily (with meals)  Qty: 90 tablet, Refills: 0    Associated Diagnoses: Protein-calorie malnutrition, unspecified  severity (H24)      cloNIDine (CATAPRES) 0.1 MG tablet Take 1 tablet (0.1 mg) by mouth at bedtime  Qty: 30 tablet, Refills: 0    Associated Diagnoses: Anxiety      cycloSPORINE modified (GENERIC EQUIVALENT) 25 MG capsule Take 5 capsules (125 mg) by mouth 2 times daily  Qty: 300 capsule, Refills: 11    Associated Diagnoses: Lung transplant status, bilateral (H)      fluticasone (FLONASE) 50 MCG/ACT nasal spray Spray 1 spray into both nostrils daily  Qty: 9.9 mL, Refills: 1    Associated Diagnoses: Lung transplant status, bilateral (H)      levothyroxine (SYNTHROID/LEVOTHROID) 25 MCG tablet Take 1 tablet (25 mcg) by mouth daily  Qty: 30 tablet, Refills: 0    Associated Diagnoses: Hypothyroidism, unspecified type      Lidocaine (LIDOCARE) 4 % Patch Place 1 patch onto the skin every 24 hours for 30 days To prevent lidocaine toxicity, patient should be patch free for 12 hrs daily.  Qty: 30 patch, Refills: 0    Associated Diagnoses: Musculoskeletal pain      liothyronine (CYTOMEL) 5 MCG tablet Take 0.5 tablets (2.5 mcg) by mouth 2 times daily  Qty: 15 tablet, Refills: 0    Associated Diagnoses: Hypothyroidism, unspecified type      midodrine (PROAMATINE) 10 MG tablet Take 1 tablet (10 mg) by mouth 2 times daily as needed (for map less than 65 or sbp less than 100 on HD days)  Qty: 30 tablet, Refills: 0    Associated Diagnoses: Hemodialysis-associated hypotension      OLANZapine zydis (ZYPREXA) 5 MG ODT Take 1 tablet (5 mg) by mouth at bedtime  Qty: 30 tablet, Refills: 0    Associated Diagnoses: Anxiety      senna-docusate (SENOKOT-S/PERICOLACE) 8.6-50 MG tablet Take 1 tablet by mouth 2 times daily as needed for constipation  Qty: 300 tablet, Refills: 0    Associated Diagnoses: Constipation, unspecified constipation type      sulfamethoxazole-trimethoprim (BACTRIM) 400-80 MG tablet Take 1 tablet by mouth three times a week  Qty: 30 tablet, Refills: 2    Associated Diagnoses: Lung transplant status, bilateral (H)            CONTINUE these medications which have CHANGED    Details   predniSONE (DELTASONE) 5 MG tablet 1 tablet (5 mg) by Per J Tube route every morning  Qty: 30 tablet, Refills: 11    Associated Diagnoses: Lung transplant status, bilateral (H)           CONTINUE these medications which have NOT CHANGED    Details   acetaminophen (TYLENOL) 500 MG tablet 500-1,000 mg by Per J Tube route 3 times daily as needed for mild pain      B Complex-C-Folic Acid (DIALYVITE) TABS 1 tablet by Per J Tube route every morning      Calcium Carbonate-Vitamin D 600-10 MG-MCG TABS 1 tablet by Per J Tube route 3 times daily  Qty: 90 tablet, Refills: 11    Associated Diagnoses: Diaphragm dysfunction      loperamide (IMODIUM A-D) 2 MG tablet 1 tablet (2 mg) by Per J Tube route 4 times daily as needed for diarrhea    Associated Diagnoses: Functional diarrhea      metoprolol tartrate (LOPRESSOR) 50 MG tablet 0.5 tablets (25 mg) by Per Feeding Tube route 2 times daily  Qty: 60 tablet, Refills: 11    Associated Diagnoses: S/P lung transplant (H)      multivitamin (CENTRUM SILVER) tablet Take 1 tablet by mouth daily    Associated Diagnoses: S/P lung transplant (H)      protein modular (PROSOURCE TF) LIQD 1 packet by Per Feeding Tube route daily  Qty: 90 packet, Refills: 3    Associated Diagnoses: S/P lung transplant (H)      sevelamer carbonate, RENVELA, 0.8 GM PACK Packet Take 1 packet (0.8 g) by mouth 3 times daily (with meals)    Associated Diagnoses: S/P lung transplant (H)           STOP taking these medications       dapsone 2 mg/mL SUSP Comments:   Reason for Stopping:         furosemide (LASIX) 40 MG tablet Comments:   Reason for Stopping:         pantoprazole (PROTONIX) 2 mg/mL SUSP suspension Comments:   Reason for Stopping:         tacrolimus (GENERIC) 1 mg/mL suspension Comments:   Reason for Stopping:         vitamin B-12 (CYANOCOBALAMIN) 500 MCG tablet Comments:   Reason for Stopping:             Allergies   Allergies   Allergen  Reactions    Blood Transfusion Related (Informational Only) Other (See Comments)     Patient has a history of a clinically significant antibody against RBC antigens.  A delay in compatible RBCs may occur.     No Clinical Screening - See Comments Other (See Comments)     Patient has a history of a clinically significant antibody against RBC antigens.  A delay in compatible RBCs may occur.    Azithromycin Rash     12/1/22: Developed a rash that is not raised and looks diffuse in nature. It started in the groin and up the back and has now worked its way up her chest into her face. Pt states that it has now started to itch. She is breathing and talking normally and denies any airway changes. Unclear what started rash. Pt noted feeling somewhat itchy yesterday.    Ganciclovir Rash     12/1/22: Developed a rash that is not raised and looks diffuse in nature. It started in the groin and up the back and has now worked its way up her chest into her face. Pt states that it has now started to itch. She is breathing and talking normally and denies any airway changes. Unclear what started rash. Pt noted feeling somewhat itchy yesterday.

## 2024-04-15 NOTE — PROGRESS NOTES
Pulmonary Medicine  Cystic Fibrosis - Lung Transplant Team  Progress Note  04/15/2024     Patient: Sofie Rodriguez  MRN: [id number]  : 1960 (age 63 year old)  Transplant: 2022 (Lung), POD#[id number]  Admission date: 2/10/2024    Assessment & Plan:     Sofie Rodriguez is a 63 year old female with h/o COPD s/p BSLT () with course complicated by post-operative hemidiaphragm palsy, recurrent PNAs, positive DSA, EBV viremia, hypogammaglobulinemia, severe gastroparesis s/p G/J tube placement (22) and pyloric botox (23), GI bleed /2 pyloric ulcer, hemobilia s/p ERCP and MRCP, chronic diarrhea, recurrent C diff colitis, ESRD on iHD, recurrent falls with injuries (recent right hip fx s/p ORIF 2023), deconditioning, and FTT.  Admitted 2/10 for failure to thrive and initiation of TPN/lipids.  Emergent intubation - for hypoxic and hypercapneic respiratory failure and encephalopathy. Returned to ICU - and again 3/18-3/20 d/t tenuous respiratory status complicated by variable daytime NIPPV tolerance and hypervolemia with iHD limited d/t hypotension. Again transferred back to ICU 3/24 for intubation and bronch/BAL given hypoxic and hypercapneic respiratory failure. CT with extensive bilateral infiltrate and etiology likely multifactorial including volume overload and infection, possible mucous plugging, ACR or AMR less likely.  Extubated , no daytime hypoxia and hypercapnia remains stable with good adherence to NIPPV with sleep.  Tolerating PO, diarrhea more stable.  Progress acidosis and hypercapnia 4/3- despite excellent adherence to overnight NIPPV and adjustments in settings, eventually improved and stabilized with bicarbonate bath adjustment per nephrology, still without daytime hypoxia.  Discharge to home today with home NIPPV device.       Discharge recommendations:  - Home AVAPS overnight and with naps, pt. to call OP team if issues arise with tolerance  - CSA level  therapeutic, repeat level at OP f/u  - CMV, DSA, and EBV monthly due 4/24  - Calorie/protein supplements to continue TID-QID upon discharge to meet PO nutritional goals of weight gain  - Pulmonary f/u as OP scheduled 4/24     Acute on chronic hypoxic/hypercapneic respiratory failure:  S/p bilateral sequential lung transplant for COPD:   Hypoventilation, Suspected CARLEE:  PFTs in clinic remained significantly below her baseline.  Baseline hypoxia with 2L NC overnight PTA.  S/p intubation 2/17-2/19 for acute hypoxic/hypercapneic respiratory failure with encephalopathy, CT with concern for infection and pulmonary edema given recent addition of TPN nutrition.  Bronch (2/18) with very friable tissue, cutlures only with C. glabrata.  Persistent respiratory failure also complicated by hypoventilation and deconditioning d/t malnutrition. Neurology consulted 2/27, MRI brain (3/1) without acute intracranial pathology.  SNIFF (3/8) normal.  Metabolic CART suggested overfeeding on TPN.  Returned to ICU (3/18-3/20) d/t tenuous respiratory status complicated by NIPPV intolerance and hypervolemia with iHD limited d/t hypotension (CXR with increased pulmonary edema).  Overall, her PCO2 retention is likely multifactorial with a component being from overfeeding (though remedied with nutrition adjustment), metabolic compensation barrier d/t renal failure, pulmonary edema, bicarb loss with diarrhea, hypoventilation with declining NIF and FVC concerning for neuromuscular etiology (though Neurology consult was not revealing), and NIPPV intolerance due to claustrophobia. Worsening hypoxic and hypercapneic respiratory failure 3/24 and transferred to ICU for intubation. CT chest with extensive bilateral infiltrates markedly increased from previous.  Bronch with small L>R sided secretions easily suctioned, BAL with no evidence of DAH, non bloody.  S/p Zosyn (3/23-4/2) for 10 day empiric course.  Extubated 4/2, hypercapnia stable with  consistent overnight BiPAP use.  CXR (4/8) with stable bibasilar opacities and small left pleural effusion.  Progressive hypercapnia and acidosis despite transition from BiPAP to AVAPS (due to decreasing TV) and gradual increase in AVAPS TV, pt with excellent adherence to use overnight and with naps. Improved with bicarbonate bath adjustment per nephrology and remains stable.  - Home AVAPS overnight and with naps, pt. to call OP team if issues arise with tolerance  - Pulmonary f/u as OP scheduled 4/24, repeat VBG at that time     Immunosuppression:  AZA previously stopped 5/2023 d/t low ImmuKnow assay and EBV viremia.  ImmuKnow (2/27) remained low at 88.  ImmuKnow (4/1) further decreased to 43.  Transitioned from [place] to CSA 3/11.  -  mg BID (increased 4/9 and transitioned to PO).  Goal 120-140 (decreased 4/4 for ImmuKnow).  Level today therapeutic, repeat level at OP f/u  - Prednisone 5 mg daily     Prophylaxis:   - Bactrim qMWF for PJP ppx  - CMV ppx not currently indicated, D+/R+, CMV negative 3/18, repeat CMV monthly due 4/24     Positive DSA: AMR treatment deferred given frailty and stable PFTs.  DSA DQB2 decreased on 3/6 with 5667 mfi, and again decreased to 4960 on 3/23.  Most recent cell-free DNA (2/18) mildly elevated at 1.04 (concerning for possible rejection), which was increased from prior level of 0.12 (1/10).  IST increase deferred at that time per Dr. Gong.  S/p IVIG (2/27) for DSA (IgG WNL).  Immuknow as above.  Prospera (cell free DNA) 0.44 (3/18) suggests AMR less likely, follow  - Repeat DSA monthly on 4/24      EBV viremia: CT CAP (2/7) without lymphadenopathy.  Recent levels improving (3/18) 23k.  - Repeat EBV 4/24     Other relevant problems being managed by the primary team:      FTT:  Severe protein calorie malnutrition:  Gastroparesis s/p PEG/J, botox, and G-POEM:  SB hypomotility:  Pyloric ulcer:  Chronic nausea and osmotic diarrhea:  SIBO s/p rifaximin:   Recurrent C diff colitis:  Chronic osmotic and infectious diarrhea since transplant with recurrent episodes of C diff.  Notable weight loss (40# in a year) d/t diarrhea, GI dysmotility, and intolerance of enteral feeds (PEG/J tube in place), most recently on elemental formula.  Extensive OP eval and f/u with GI. S/p port placement for TPN and lipids. Continued for ~5 weeks inpatient without considerable improvement, transitioned back to TF.  C diff positive (3/13), on fidaxomicin.  Repeat (4/4) negative, enteric B/V panel also negative.  Loose stools persist, GI consulted 4/4.  Colonoscopy recommended by GI this admission, but pt. deferred d/t risk for reintubation and improvement in diarrhea today.  - Tube feeds per RD  - Calorie/protein supplements to continue TID-QID upon discharge to meet PO nutritional goals of weight gain     ESRD: CT with volume overload secondary to TPN volume, iHD increased from PTA TThSa schedule with unsustained improvement.  Management per Nephrology, dialysis via [facility].  Unable to remove fluid on 3/23 d/t hypotension, tolerance now improved with midodrine on HD days.  Volume removal and dialysis per nephrology.     Goals of care: Care conference held with palliative and primary team on 3/15 for medical update with pt. and daughters.  Repeat care conference 3/29 (see palliative and MICU notes) patient would like to proceed with re-intubation and trach if pt. fails extubation d/t progressive hypercapnia, understanding that this would severely complicate potential disposition options.     We appreciate the excellent care provided by the [place] [id number] team.  Recommendations communicated via Vocera and this note.  Will continue to follow along closely, please do not hesitate to call with any questions or concerns.    Patient discussed with Dr. Gong.    Catherine Johnson, DNP, APRN, CNP  Inpatient Nurse Practitioner  Pulmonary CF/Transplant     Subjective & Interval History:     Remains on RA with good adherence  to overnight AVAPS, no new cough or sputum.  Still focusing on PO intake with diet and supplements, no GI complaints (stools regular).    Review of Systems:     C: No fever, no chills  INTEGUMENTARY/SKIN: No rash or obvious new lesions  ENT/MOUTH: No nasal congestion or drainage  RESP: See interval history  CV: No chest pain, no palpitations, no peripheral edema, no orthopnea  GI: No nausea, no vomiting, no reflux symptoms  : Oliguria  MUSCULOSKELETAL: No myalgias, no arthralgias  ENDOCRINE: Blood sugars with adequate control  NEURO: No headache  PSYCHIATRIC: Mood stable    Physical Exam:     All notes, images, and labs from past 24 hours (at minimum) were reviewed.    Vital signs:  Temp: 97.9  F (36.6  C) Temp src: Oral BP: 106/64 Pulse: 64   Resp: 19 SpO2: 100 % O2 Device: BiPAP/CPAP (AVAPS personal) Oxygen Delivery: 2 LPM   Weight: 40.2 kg (88 lb 10 oz)  I/O:   Intake/Output Summary (Last 24 hours) at 4/15/2024 0852  Last data filed at 4/15/2024 0600  Gross per 24 hour   Intake 1960 ml   Output --   Net 1960 ml     Constitutional: Standing in room, thin appearing, in no apparent distress.   HEENT: Eyes with pink conjunctivae, anicteric.  Oral mucosa moist without lesions.   PULM: Non-labored breathing on RA.  CV: No peripheral edema.   ABD: Nondistended.  PEG/J tube site not visualized.  MSK: Moves all extremities.  + muscle wasting.   NEURO: Alert and conversant.   SKIN: Warm, dry, fragile, scattered ecchymosis.   PSYCH: Mood stable.     Data:     LABS    CMP:   Recent Labs   Lab 04/15/24  0515 04/14/24  0520 04/13/24  0643 04/12/24  0447 04/11/24  1622 04/11/24  0443 04/10/24  0621 04/09/24  0710    137 138 138  --  136 137 141   POTASSIUM 4.6 4.1 3.6 3.6  --  3.9 3.8 4.8   CHLORIDE 95* 96* 93* 94*  --  90* 90* 97*   CO2 25 27 29 31*  --  30* 32* 26   ANIONGAP 18* 14 16* 13  --  16* 15 18*   * 94 181* 96   < > 107* 112* 84   BUN 82.9* 51.5* 81.6* 40.2*  --  76.8* 50.2* 100.0*   CR 5.60* 3.73*  "5.25* 3.21*  --  5.36* 3.76* 6.59*   GFRESTIMATED 8* 13* 9* 16*  --  9* 13* 7*   ESTUARDO 8.9 9.0 8.9 8.9  --  9.0 8.9 9.1   MAG 1.9 1.7 1.9 1.7  --  1.9 1.8 2.3   PHOS 6.1* 4.7* 5.6* 3.8  --  5.7* 4.6* 6.0*   PROTTOTAL  --   --   --   --   --  5.6* 6.0* 5.8*   ALBUMIN  --   --   --   --   --  3.5 3.7 3.5   BILITOTAL  --   --   --   --   --  0.3 0.2 0.2   ALKPHOS  --   --   --   --   --  133 113 118   AST  --   --   --   --   --  18 18 16   ALT  --   --   --   --   --  11 9 10    < > = values in this interval not displayed.     CBC:   Recent Labs   Lab 04/15/24  0515 04/14/24  0520 04/12/24  0447 04/11/24  0443   WBC 6.8 6.5 5.3 5.5   RBC 2.93* 3.21* 3.04* 2.78*   HGB 10.2* 11.0* 10.6* 9.4*   HCT 32.3* 35.8 33.7* 30.3*   * 112* 111* 109*   MCH 34.8* 34.3* 34.9* 33.8*   MCHC 31.6 30.7* 31.5 31.0*   RDW 17.3* 17.2* 17.3* 17.3*    209 209 214       INR:   Recent Labs   Lab 04/15/24  0515 04/14/24  0520 04/13/24  0643 04/12/24 0447   INR 1.09 1.15 1.10 1.09       Glucose:   Recent Labs   Lab 04/15/24  0515 04/14/24  0520 04/13/24  0643 04/12/24 0447 04/11/24  1622 04/11/24  0443   * 94 181* 96 163* 107*       Blood Gas:   Recent Labs   Lab 04/15/24  0515 04/14/24  0520 04/13/24  0643   PHV 7.32 7.33 7.29*   PCO2V 57* 59* 68*   PO2V 54* 49* 83*   HCO3V 29* 31* 33*   LINDY 2.4 3.9* 4.7*   O2PER 40 21 2       Culture Data No results for input(s): \"CULT\" in the last 168 hours.    Virology Data:   Lab Results   Component Value Date    FLUAH1 Not Detected 03/24/2024    FLUAH3 Not Detected 03/24/2024    [id number] Not Detected 03/24/2024    IFLUB Not Detected 03/24/2024    RSVA Not Detected 03/24/2024    RSVB Not Detected 03/24/2024    PIV1 Not Detected 03/24/2024    PIV2 Not Detected 03/24/2024    PIV3 Not Detected 03/24/2024    HMPV Not Detected 03/24/2024       Historical CMV results (last 3 of prior testing):  Lab Results   Component Value Date    CMVQNT Not Detected 03/18/2024    CMVQNT Not Detected 02/19/2024 "    CMVQNT Not Detected 02/07/2024     Lab Results   Component Value Date    CMVLOG 3.2 07/12/2023    CMVLOG <2.1 04/19/2023    CMVLOG 3.5 01/25/2023       Urine Studies    Recent Labs   Lab Test 02/18/24  0222 05/18/23  0627   URINEPH 7.5* 5.0   NITRITE Negative Negative   LEUKEST Trace* Moderate*   WBCU 66* 21*       Most Recent [place] Pulmonary Function Testing (FVC/FEV1 only)  FVC-Pre   Date Value Ref Range Status   02/07/2024 1.19 L    01/10/2024 1.12 L    08/29/2023 1.48 L    07/25/2023 1.55 L      FVC-%Pred-Pre   Date Value Ref Range Status   02/07/2024 42 %    01/10/2024 39 %    08/29/2023 53 %    07/25/2023 55 %      FEV1-Pre   Date Value Ref Range Status   02/07/2024 1.13 L    01/10/2024 1.10 L    08/29/2023 1.43 L    07/25/2023 1.54 L      FEV1-%Pred-Pre   Date Value Ref Range Status   02/07/2024 51 %    01/10/2024 49 %    08/29/2023 64 %    07/25/2023 69 %        IMAGING    No results found for this or any previous visit (from the past 48 hour(s)).

## 2024-04-15 NOTE — DISCHARGE SUMMARY
Dialysis Discharge Summary Brief    [facility]  Division of Nephrology  Nephrology Discharge Dialysis Orders  Ph: [phone]  Fax: [phone]    Sofie Rodriguez  MRN: [id number]  YOB: 1960    [facility]  Primary Nephrologist: Dr. Fairbanks    Date of Admission: 2/10/2024  Date of Discharge: 4/15/2024    Please note new EDW. If only able to accommodate 3 hr runs at this time, please add extra UF runs if needed to maintain EDW. Sofie looks great! Please page me at  with any questions. Thanks much. Pema Haider PA-C    Sofie Rodriguez is a 63 year old year old female with ESKD, COPD s/p b/l lung transplant in 6/2022 with multiple complications, gastroparesis s/p GJ tube, GIB, chronic diarrhea, recurrent c-diff, FTT with inability to tolerate any tube feeds, R hip fx s/p ORIF 12/2023, admitted on 2/10 for initiation of TPN/lipids, transferred to ICU on 2/17 with worsening mental status and respiratory acidosis in spite of bipap, ultimately intubated on 2/18, being treated for PNA, also with volume overload in setting of high volume TPN. Persistent respiratory acidosis in spite of volume off, transferred to ICU for hypotension and respiratory failure, improved on bipap, now floor status again 3/1. Transferred to ICU 3/18 again with worsening hypercapnic respiratory failure. Transferred to floor 3/20, back to ICU 3/24, now stable and preparing for discharge     # ESKD - TTS, LUE AVG, 3hr, prior to admission EDW 45.5 kg, now much lower at 38.5 kg[facility], Dr. Andres Fair[facility].  - HD per TTS schedule. Pt is agreeable to 3.5 hr runs if it means avoiding 4x/week dialysis  - Requires EMLA cream an hour before HD        #Persistent respiratory acidosis:    - now tolerating bipap at night and when napping  - dialyzing on bicarb 38     # Aflutter: on amio and BB  # Volume/BP: Anuric; on metoprolol soln 25 mg bid   - new EDW 38.5-39 kg  - UF 2.5 kg  on Thurs/Sat and 3kg on Tues generally       # Anemia 2/2 ESKD  - hgb 10's  - iron labs 3/31: ferritin 1007, iron 34, IS 26 (at goal)  -have been giving epogen while admitted     # BMD:   - Ca 8-9's, phos 6.1, alb 3.5  - on sevelamer 0.8g pck bid     # Nutrition: on [employer] tube feeds and regular diet        Discharge Diagnosis:    ICD-10-CM    1. Protein-calorie malnutrition, unspecified severity (H24)  E46 calcium carbonate-vitamin D (CALTRATE) 600-10 MG-MCG per tablet      2. Failure to thrive in adult  R62.7 sodium chloride 0.9% BOLUS 250 mL     sodium chloride 0.9% BOLUS 300 mL     No heparin via hemodialysis machine     No heparin via hemodialysis machine     sodium chloride 0.9% BOLUS 300 mL     sodium chloride 0.9% BOLUS 250 mL     DISCONTINUED: albumin human 5 % injection  mL     DISCONTINUED: lidocaine 1 % 0.5 mL     DISCONTINUED: lidocaine 1 % 0.5 mL     DISCONTINUED: Stop Heparin 60 minutes before end of treatment     DISCONTINUED: Stop Heparin 60 minutes before end of treatment     DISCONTINUED: lidocaine 1 % 0.5 mL     DISCONTINUED: lidocaine 1 % 0.5 mL     DISCONTINUED: albumin human 5 % injection  mL     CANCELED: Home Care Referral     CANCELED: Home Infusion Referral      3. ESRD (end stage renal disease) on dialysis (H)  N18.6 lidocaine-prilocaine (EMLA) cream    Z99.2 sodium chloride 0.9% BOLUS 250 mL     sodium chloride 0.9% BOLUS 300 mL     No heparin via hemodialysis machine     No heparin via hemodialysis machine     sodium chloride 0.9% BOLUS 300 mL     sodium chloride 0.9% BOLUS 250 mL     DISCONTINUED: albumin human 5 % injection  mL     DISCONTINUED: lidocaine 1 % 0.5 mL     DISCONTINUED: lidocaine 1 % 0.5 mL     DISCONTINUED: Stop Heparin 60 minutes before end of treatment     DISCONTINUED: Stop Heparin 60 minutes before end of treatment     DISCONTINUED: lidocaine 1 % 0.5 mL     DISCONTINUED: lidocaine 1 % 0.5 mL     DISCONTINUED: albumin human 5 % injection   mL      4. Clinical diagnosis of COVID-19  U07.1 sodium chloride 0.9% BOLUS 250 mL     sodium chloride 0.9% BOLUS 300 mL     No heparin via hemodialysis machine     No heparin via hemodialysis machine     sodium chloride 0.9% BOLUS 300 mL     sodium chloride 0.9% BOLUS 250 mL     DISCONTINUED: albumin human 5 % injection  mL     DISCONTINUED: lidocaine 1 % 0.5 mL     DISCONTINUED: lidocaine 1 % 0.5 mL     DISCONTINUED: Stop Heparin 60 minutes before end of treatment     DISCONTINUED: Stop Heparin 60 minutes before end of treatment     DISCONTINUED: lidocaine 1 % 0.5 mL     DISCONTINUED: lidocaine 1 % 0.5 mL     DISCONTINUED: albumin human 5 % injection  mL      5. Leukopenia, unspecified type  D72.819 sodium chloride 0.9% BOLUS 250 mL     sodium chloride 0.9% BOLUS 300 mL     No heparin via hemodialysis machine     No heparin via hemodialysis machine     sodium chloride 0.9% BOLUS 300 mL     sodium chloride 0.9% BOLUS 250 mL     DISCONTINUED: albumin human 5 % injection  mL     DISCONTINUED: lidocaine 1 % 0.5 mL     DISCONTINUED: lidocaine 1 % 0.5 mL     DISCONTINUED: Stop Heparin 60 minutes before end of treatment     DISCONTINUED: Stop Heparin 60 minutes before end of treatment     DISCONTINUED: lidocaine 1 % 0.5 mL     DISCONTINUED: lidocaine 1 % 0.5 mL     DISCONTINUED: albumin human 5 % injection  mL      6. C. difficile colitis  A04.72 sodium chloride 0.9% BOLUS 250 mL     sodium chloride 0.9% BOLUS 300 mL     No heparin via hemodialysis machine     No heparin via hemodialysis machine     sodium chloride 0.9% BOLUS 300 mL     sodium chloride 0.9% BOLUS 250 mL     DISCONTINUED: albumin human 5 % injection  mL     DISCONTINUED: lidocaine 1 % 0.5 mL     DISCONTINUED: lidocaine 1 % 0.5 mL     DISCONTINUED: Stop Heparin 60 minutes before end of treatment     DISCONTINUED: Stop Heparin 60 minutes before end of treatment     DISCONTINUED: lidocaine 1 % 0.5 mL     DISCONTINUED: lidocaine  1 % 0.5 mL     DISCONTINUED: albumin human 5 % injection  mL      7. Lung transplant status, bilateral (H)  Z94.2 sodium chloride 0.9% BOLUS 250 mL     sodium chloride 0.9% BOLUS 300 mL     No heparin via hemodialysis machine     No heparin via hemodialysis machine     sodium chloride 0.9% BOLUS 300 mL     sodium chloride 0.9% BOLUS 250 mL     Adult Sleep Eval & Management Referral     Medication Therapy Management Referral     cycloSPORINE modified (GENERIC EQUIVALENT) 25 MG capsule     fluticasone (FLONASE) 50 MCG/ACT nasal spray     predniSONE (DELTASONE) 5 MG tablet     sulfamethoxazole-trimethoprim (BACTRIM) 400-80 MG tablet     DISCONTINUED: albumin human 5 % injection  mL     DISCONTINUED: lidocaine 1 % 0.5 mL     DISCONTINUED: lidocaine 1 % 0.5 mL     DISCONTINUED: Stop Heparin 60 minutes before end of treatment     DISCONTINUED: Stop Heparin 60 minutes before end of treatment     DISCONTINUED: lidocaine 1 % 0.5 mL     DISCONTINUED: lidocaine 1 % 0.5 mL     DISCONTINUED: albumin human 5 % injection  mL      8. Diarrhea of presumed infectious origin  R19.7 sodium chloride 0.9% BOLUS 250 mL     sodium chloride 0.9% BOLUS 300 mL     No heparin via hemodialysis machine     No heparin via hemodialysis machine     sodium chloride 0.9% BOLUS 300 mL     sodium chloride 0.9% BOLUS 250 mL     DISCONTINUED: albumin human 5 % injection  mL     DISCONTINUED: lidocaine 1 % 0.5 mL     DISCONTINUED: lidocaine 1 % 0.5 mL     DISCONTINUED: Stop Heparin 60 minutes before end of treatment     DISCONTINUED: Stop Heparin 60 minutes before end of treatment     DISCONTINUED: lidocaine 1 % 0.5 mL     DISCONTINUED: lidocaine 1 % 0.5 mL     DISCONTINUED: albumin human 5 % injection  mL      9. Gastroparesis  K31.84 sodium chloride 0.9% BOLUS 250 mL     sodium chloride 0.9% BOLUS 300 mL     No heparin via hemodialysis machine     No heparin via hemodialysis machine     sodium chloride 0.9% BOLUS 300 mL      sodium chloride 0.9% BOLUS 250 mL     DISCONTINUED: albumin human 5 % injection  mL     DISCONTINUED: lidocaine 1 % 0.5 mL     DISCONTINUED: lidocaine 1 % 0.5 mL     DISCONTINUED: Stop Heparin 60 minutes before end of treatment     DISCONTINUED: Stop Heparin 60 minutes before end of treatment     DISCONTINUED: lidocaine 1 % 0.5 mL     DISCONTINUED: lidocaine 1 % 0.5 mL     DISCONTINUED: albumin human 5 % injection  mL      10. Lung infection  J18.9 sodium chloride 0.9% BOLUS 250 mL     sodium chloride 0.9% BOLUS 300 mL     No heparin via hemodialysis machine     No heparin via hemodialysis machine     sodium chloride 0.9% BOLUS 300 mL     sodium chloride 0.9% BOLUS 250 mL     DISCONTINUED: albumin human 5 % injection  mL     DISCONTINUED: lidocaine 1 % 0.5 mL     DISCONTINUED: lidocaine 1 % 0.5 mL     DISCONTINUED: Stop Heparin 60 minutes before end of treatment     DISCONTINUED: Stop Heparin 60 minutes before end of treatment     DISCONTINUED: lidocaine 1 % 0.5 mL     DISCONTINUED: lidocaine 1 % 0.5 mL     DISCONTINUED: albumin human 5 % injection  mL      11. Acute pulmonary edema (H)  J81.0 sodium chloride 0.9% BOLUS 250 mL     sodium chloride 0.9% BOLUS 300 mL     No heparin via hemodialysis machine     No heparin via hemodialysis machine     sodium chloride 0.9% BOLUS 300 mL     sodium chloride 0.9% BOLUS 250 mL     DISCONTINUED: albumin human 5 % injection  mL     DISCONTINUED: lidocaine 1 % 0.5 mL     DISCONTINUED: lidocaine 1 % 0.5 mL     DISCONTINUED: Stop Heparin 60 minutes before end of treatment     DISCONTINUED: Stop Heparin 60 minutes before end of treatment     DISCONTINUED: lidocaine 1 % 0.5 mL     DISCONTINUED: lidocaine 1 % 0.5 mL     DISCONTINUED: albumin human 5 % injection  mL      12. Hemoptysis  R04.2 sodium chloride 0.9% BOLUS 250 mL     sodium chloride 0.9% BOLUS 300 mL     No heparin via hemodialysis machine     No heparin via hemodialysis machine      sodium chloride 0.9% BOLUS 300 mL     sodium chloride 0.9% BOLUS 250 mL     DISCONTINUED: albumin human 5 % injection  mL     DISCONTINUED: lidocaine 1 % 0.5 mL     DISCONTINUED: lidocaine 1 % 0.5 mL     DISCONTINUED: Stop Heparin 60 minutes before end of treatment     DISCONTINUED: Stop Heparin 60 minutes before end of treatment     DISCONTINUED: lidocaine 1 % 0.5 mL     DISCONTINUED: lidocaine 1 % 0.5 mL     DISCONTINUED: albumin human 5 % injection  mL      13. Transplant rejection  T86.91 sodium chloride 0.9% BOLUS 250 mL     sodium chloride 0.9% BOLUS 300 mL     No heparin via hemodialysis machine     No heparin via hemodialysis machine     sodium chloride 0.9% BOLUS 300 mL     sodium chloride 0.9% BOLUS 250 mL     DISCONTINUED: albumin human 5 % injection  mL     DISCONTINUED: lidocaine 1 % 0.5 mL     DISCONTINUED: lidocaine 1 % 0.5 mL     DISCONTINUED: Stop Heparin 60 minutes before end of treatment     DISCONTINUED: Stop Heparin 60 minutes before end of treatment     DISCONTINUED: lidocaine 1 % 0.5 mL     DISCONTINUED: lidocaine 1 % 0.5 mL     DISCONTINUED: albumin human 5 % injection  mL      14. Anemia of chronic renal failure, stage 3b (H)  N18.32 sodium chloride 0.9% BOLUS 250 mL    D63.1 sodium chloride 0.9% BOLUS 300 mL     No heparin via hemodialysis machine     No heparin via hemodialysis machine     sodium chloride 0.9% BOLUS 300 mL     sodium chloride 0.9% BOLUS 250 mL     DISCONTINUED: albumin human 5 % injection  mL     DISCONTINUED: lidocaine 1 % 0.5 mL     DISCONTINUED: lidocaine 1 % 0.5 mL     DISCONTINUED: Stop Heparin 60 minutes before end of treatment     DISCONTINUED: Stop Heparin 60 minutes before end of treatment     DISCONTINUED: lidocaine 1 % 0.5 mL     DISCONTINUED: lidocaine 1 % 0.5 mL     DISCONTINUED: albumin human 5 % injection  mL      15. Stage 3b chronic kidney disease (H)  N18.32 sodium chloride 0.9% BOLUS 250 mL     sodium chloride 0.9%  BOLUS 300 mL     No heparin via hemodialysis machine     No heparin via hemodialysis machine     sodium chloride 0.9% BOLUS 300 mL     sodium chloride 0.9% BOLUS 250 mL     DISCONTINUED: albumin human 5 % injection  mL     DISCONTINUED: lidocaine 1 % 0.5 mL     DISCONTINUED: lidocaine 1 % 0.5 mL     DISCONTINUED: Stop Heparin 60 minutes before end of treatment     DISCONTINUED: Stop Heparin 60 minutes before end of treatment     DISCONTINUED: lidocaine 1 % 0.5 mL     DISCONTINUED: lidocaine 1 % 0.5 mL     DISCONTINUED: albumin human 5 % injection  mL      16. Gastrojejunostomy tube status (H)  Z93.4 sodium chloride 0.9% BOLUS 250 mL     sodium chloride 0.9% BOLUS 300 mL     No heparin via hemodialysis machine     No heparin via hemodialysis machine     sodium chloride 0.9% BOLUS 300 mL     sodium chloride 0.9% BOLUS 250 mL     DISCONTINUED: albumin human 5 % injection  mL     DISCONTINUED: lidocaine 1 % 0.5 mL     DISCONTINUED: lidocaine 1 % 0.5 mL     DISCONTINUED: Stop Heparin 60 minutes before end of treatment     DISCONTINUED: Stop Heparin 60 minutes before end of treatment     DISCONTINUED: lidocaine 1 % 0.5 mL     DISCONTINUED: lidocaine 1 % 0.5 mL     DISCONTINUED: albumin human 5 % injection  mL      17. Anemia in other chronic diseases classified elsewhere  D63.8 sodium chloride 0.9% BOLUS 250 mL     sodium chloride 0.9% BOLUS 300 mL     No heparin via hemodialysis machine     No heparin via hemodialysis machine     sodium chloride 0.9% BOLUS 300 mL     sodium chloride 0.9% BOLUS 250 mL     DISCONTINUED: albumin human 5 % injection  mL     DISCONTINUED: lidocaine 1 % 0.5 mL     DISCONTINUED: lidocaine 1 % 0.5 mL     DISCONTINUED: Stop Heparin 60 minutes before end of treatment     DISCONTINUED: Stop Heparin 60 minutes before end of treatment     DISCONTINUED: lidocaine 1 % 0.5 mL     DISCONTINUED: lidocaine 1 % 0.5 mL     DISCONTINUED: albumin human 5 % injection  mL       18. 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, unspecified whether long term insulin use (H24)  E09.65 sodium chloride 0.9% BOLUS 250 mL     sodium chloride 0.9% BOLUS 300 mL     No heparin via hemodialysis machine     No heparin via hemodialysis machine     sodium chloride 0.9% BOLUS 300 mL     sodium chloride 0.9% BOLUS 250 mL     DISCONTINUED: albumin human 5 % injection  mL     DISCONTINUED: lidocaine 1 % 0.5 mL     DISCONTINUED: lidocaine 1 % 0.5 mL     DISCONTINUED: Stop Heparin 60 minutes before end of treatment     DISCONTINUED: Stop Heparin 60 minutes before end of treatment     DISCONTINUED: lidocaine 1 % 0.5 mL     DISCONTINUED: lidocaine 1 % 0.5 mL     DISCONTINUED: albumin human 5 % injection  mL      19. Hypogammaglobulinemia (H24)  D80.1 sodium chloride 0.9% BOLUS 250 mL     sodium chloride 0.9% BOLUS 300 mL     No heparin via hemodialysis machine     No heparin via hemodialysis machine     sodium chloride 0.9% BOLUS 300 mL     sodium chloride 0.9% BOLUS 250 mL     DISCONTINUED: albumin human 5 % injection  mL     DISCONTINUED: lidocaine 1 % 0.5 mL     DISCONTINUED: lidocaine 1 % 0.5 mL     DISCONTINUED: Stop Heparin 60 minutes before end of treatment     DISCONTINUED: Stop Heparin 60 minutes before end of treatment     DISCONTINUED: lidocaine 1 % 0.5 mL     DISCONTINUED: lidocaine 1 % 0.5 mL     DISCONTINUED: albumin human 5 % injection  mL      20. Acute pylorus ulcer  K25.3 sodium chloride 0.9% BOLUS 250 mL     sodium chloride 0.9% BOLUS 300 mL     No heparin via hemodialysis machine     No heparin via hemodialysis machine     sodium chloride 0.9% BOLUS 300 mL     sodium chloride 0.9% BOLUS 250 mL     DISCONTINUED: albumin human 5 % injection  mL     DISCONTINUED: lidocaine 1 % 0.5 mL     DISCONTINUED: lidocaine 1 % 0.5 mL     DISCONTINUED: Stop Heparin 60 minutes before end of treatment     DISCONTINUED: Stop Heparin 60 minutes before end of  treatment     DISCONTINUED: lidocaine 1 % 0.5 mL     DISCONTINUED: lidocaine 1 % 0.5 mL     DISCONTINUED: albumin human 5 % injection  mL      21. EBV (Vibha-Barr virus) viremia  B27.00 sodium chloride 0.9% BOLUS 250 mL     sodium chloride 0.9% BOLUS 300 mL     No heparin via hemodialysis machine     No heparin via hemodialysis machine     sodium chloride 0.9% BOLUS 300 mL     sodium chloride 0.9% BOLUS 250 mL     DISCONTINUED: albumin human 5 % injection  mL     DISCONTINUED: lidocaine 1 % 0.5 mL     DISCONTINUED: lidocaine 1 % 0.5 mL     DISCONTINUED: Stop Heparin 60 minutes before end of treatment     DISCONTINUED: Stop Heparin 60 minutes before end of treatment     DISCONTINUED: lidocaine 1 % 0.5 mL     DISCONTINUED: lidocaine 1 % 0.5 mL     DISCONTINUED: albumin human 5 % injection  mL      22. Administration of long-term prophylactic antibiotics  Z79.2 sodium chloride 0.9% BOLUS 250 mL     sodium chloride 0.9% BOLUS 300 mL     No heparin via hemodialysis machine     No heparin via hemodialysis machine     sodium chloride 0.9% BOLUS 300 mL     sodium chloride 0.9% BOLUS 250 mL     DISCONTINUED: albumin human 5 % injection  mL     DISCONTINUED: lidocaine 1 % 0.5 mL     DISCONTINUED: lidocaine 1 % 0.5 mL     DISCONTINUED: Stop Heparin 60 minutes before end of treatment     DISCONTINUED: Stop Heparin 60 minutes before end of treatment     DISCONTINUED: lidocaine 1 % 0.5 mL     DISCONTINUED: lidocaine 1 % 0.5 mL     DISCONTINUED: albumin human 5 % injection  mL      23. Paroxysmal A-fib (H)  I48.0 sodium chloride 0.9% BOLUS 250 mL     sodium chloride 0.9% BOLUS 300 mL     No heparin via hemodialysis machine     No heparin via hemodialysis machine     sodium chloride 0.9% BOLUS 300 mL     sodium chloride 0.9% BOLUS 250 mL     amiodarone (PACERONE) 200 MG tablet     apixaban ANTICOAGULANT (ELIQUIS) 2.5 MG tablet     DISCONTINUED: albumin human 5 % injection  mL     DISCONTINUED:  lidocaine 1 % 0.5 mL     DISCONTINUED: lidocaine 1 % 0.5 mL     DISCONTINUED: Stop Heparin 60 minutes before end of treatment     DISCONTINUED: Stop Heparin 60 minutes before end of treatment     DISCONTINUED: lidocaine 1 % 0.5 mL     DISCONTINUED: lidocaine 1 % 0.5 mL     DISCONTINUED: albumin human 5 % injection  mL      24. Diaphragm dysfunction  J98.6 sodium chloride 0.9% BOLUS 250 mL     sodium chloride 0.9% BOLUS 300 mL     No heparin via hemodialysis machine     No heparin via hemodialysis machine     sodium chloride 0.9% BOLUS 300 mL     sodium chloride 0.9% BOLUS 250 mL     DISCONTINUED: albumin human 5 % injection  mL     DISCONTINUED: lidocaine 1 % 0.5 mL     DISCONTINUED: lidocaine 1 % 0.5 mL     DISCONTINUED: Stop Heparin 60 minutes before end of treatment     DISCONTINUED: Stop Heparin 60 minutes before end of treatment     DISCONTINUED: lidocaine 1 % 0.5 mL     DISCONTINUED: lidocaine 1 % 0.5 mL     DISCONTINUED: albumin human 5 % injection  mL      25. Thrombus of left radial artery (H)  I74.2 sodium chloride 0.9% BOLUS 250 mL     sodium chloride 0.9% BOLUS 300 mL     No heparin via hemodialysis machine     No heparin via hemodialysis machine     sodium chloride 0.9% BOLUS 300 mL     sodium chloride 0.9% BOLUS 250 mL     DISCONTINUED: albumin human 5 % injection  mL     DISCONTINUED: lidocaine 1 % 0.5 mL     DISCONTINUED: lidocaine 1 % 0.5 mL     DISCONTINUED: Stop Heparin 60 minutes before end of treatment     DISCONTINUED: Stop Heparin 60 minutes before end of treatment     DISCONTINUED: lidocaine 1 % 0.5 mL     DISCONTINUED: lidocaine 1 % 0.5 mL     DISCONTINUED: albumin human 5 % injection  mL      26. Acute kidney failure with tubular necrosis (H24)  N17.0 sodium chloride 0.9% BOLUS 250 mL     sodium chloride 0.9% BOLUS 300 mL     No heparin via hemodialysis machine     No heparin via hemodialysis machine     sodium chloride 0.9% BOLUS 300 mL     sodium chloride  0.9% BOLUS 250 mL     DISCONTINUED: albumin human 5 % injection  mL     DISCONTINUED: lidocaine 1 % 0.5 mL     DISCONTINUED: lidocaine 1 % 0.5 mL     DISCONTINUED: Stop Heparin 60 minutes before end of treatment     DISCONTINUED: Stop Heparin 60 minutes before end of treatment     DISCONTINUED: lidocaine 1 % 0.5 mL     DISCONTINUED: lidocaine 1 % 0.5 mL     DISCONTINUED: albumin human 5 % injection  mL      27. Immunosuppressed status (H24)  D84.9 sodium chloride 0.9% BOLUS 250 mL     sodium chloride 0.9% BOLUS 300 mL     No heparin via hemodialysis machine     No heparin via hemodialysis machine     sodium chloride 0.9% BOLUS 300 mL     sodium chloride 0.9% BOLUS 250 mL     DISCONTINUED: albumin human 5 % injection  mL     DISCONTINUED: lidocaine 1 % 0.5 mL     DISCONTINUED: lidocaine 1 % 0.5 mL     DISCONTINUED: Stop Heparin 60 minutes before end of treatment     DISCONTINUED: Stop Heparin 60 minutes before end of treatment     DISCONTINUED: lidocaine 1 % 0.5 mL     DISCONTINUED: lidocaine 1 % 0.5 mL     DISCONTINUED: albumin human 5 % injection  mL      28. Acute post-operative pain  G89.18 sodium chloride 0.9% BOLUS 250 mL     sodium chloride 0.9% BOLUS 300 mL     No heparin via hemodialysis machine     No heparin via hemodialysis machine     sodium chloride 0.9% BOLUS 300 mL     sodium chloride 0.9% BOLUS 250 mL     DISCONTINUED: albumin human 5 % injection  mL     DISCONTINUED: lidocaine 1 % 0.5 mL     DISCONTINUED: lidocaine 1 % 0.5 mL     DISCONTINUED: Stop Heparin 60 minutes before end of treatment     DISCONTINUED: Stop Heparin 60 minutes before end of treatment     DISCONTINUED: lidocaine 1 % 0.5 mL     DISCONTINUED: lidocaine 1 % 0.5 mL     DISCONTINUED: albumin human 5 % injection  mL      29. S/P lung transplant (H)  Z94.2 sodium chloride 0.9% BOLUS 250 mL     sodium chloride 0.9% BOLUS 300 mL     No heparin via hemodialysis machine     No heparin via hemodialysis  machine     sodium chloride 0.9% BOLUS 300 mL     sodium chloride 0.9% BOLUS 250 mL     protein modular (PROSOURCE TF) LIQD     Home Infusion Referral     Pulmonary Rehab  Referral     DISCONTINUED: albumin human 5 % injection  mL     DISCONTINUED: lidocaine 1 % 0.5 mL     DISCONTINUED: lidocaine 1 % 0.5 mL     DISCONTINUED: Stop Heparin 60 minutes before end of treatment     DISCONTINUED: Stop Heparin 60 minutes before end of treatment     DISCONTINUED: lidocaine 1 % 0.5 mL     DISCONTINUED: lidocaine 1 % 0.5 mL     DISCONTINUED: albumin human 5 % injection  mL      30. Chronic hepatitis C without hepatic coma (H)  B18.2 sodium chloride 0.9% BOLUS 250 mL     sodium chloride 0.9% BOLUS 300 mL     No heparin via hemodialysis machine     No heparin via hemodialysis machine     sodium chloride 0.9% BOLUS 300 mL     sodium chloride 0.9% BOLUS 250 mL     DISCONTINUED: albumin human 5 % injection  mL     DISCONTINUED: lidocaine 1 % 0.5 mL     DISCONTINUED: lidocaine 1 % 0.5 mL     DISCONTINUED: Stop Heparin 60 minutes before end of treatment     DISCONTINUED: Stop Heparin 60 minutes before end of treatment     DISCONTINUED: lidocaine 1 % 0.5 mL     DISCONTINUED: lidocaine 1 % 0.5 mL     DISCONTINUED: albumin human 5 % injection  mL      31. Status post coronary angiogram  Z98.890 sodium chloride 0.9% BOLUS 250 mL     sodium chloride 0.9% BOLUS 300 mL     No heparin via hemodialysis machine     No heparin via hemodialysis machine     sodium chloride 0.9% BOLUS 300 mL     sodium chloride 0.9% BOLUS 250 mL     DISCONTINUED: albumin human 5 % injection  mL     DISCONTINUED: lidocaine 1 % 0.5 mL     DISCONTINUED: lidocaine 1 % 0.5 mL     DISCONTINUED: Stop Heparin 60 minutes before end of treatment     DISCONTINUED: Stop Heparin 60 minutes before end of treatment     DISCONTINUED: lidocaine 1 % 0.5 mL     DISCONTINUED: lidocaine 1 % 0.5 mL     DISCONTINUED: albumin human 5 % injection   mL      32. Abnormal findings on diagnostic imaging of cardiovascular system  R93.89 sodium chloride 0.9% BOLUS 250 mL     sodium chloride 0.9% BOLUS 300 mL     No heparin via hemodialysis machine     No heparin via hemodialysis machine     sodium chloride 0.9% BOLUS 300 mL     sodium chloride 0.9% BOLUS 250 mL     DISCONTINUED: albumin human 5 % injection  mL     DISCONTINUED: lidocaine 1 % 0.5 mL     DISCONTINUED: lidocaine 1 % 0.5 mL     DISCONTINUED: Stop Heparin 60 minutes before end of treatment     DISCONTINUED: Stop Heparin 60 minutes before end of treatment     DISCONTINUED: lidocaine 1 % 0.5 mL     DISCONTINUED: lidocaine 1 % 0.5 mL     DISCONTINUED: albumin human 5 % injection  mL      33. Chronic obstructive pulmonary disease, unspecified COPD type (H)  J44.9 sodium chloride 0.9% BOLUS 250 mL     sodium chloride 0.9% BOLUS 300 mL     No heparin via hemodialysis machine     No heparin via hemodialysis machine     sodium chloride 0.9% BOLUS 300 mL     sodium chloride 0.9% BOLUS 250 mL     Adult Sleep Eval & Management Referral     Medication Therapy Management Referral     DISCONTINUED: albumin human 5 % injection  mL     DISCONTINUED: lidocaine 1 % 0.5 mL     DISCONTINUED: lidocaine 1 % 0.5 mL     DISCONTINUED: Stop Heparin 60 minutes before end of treatment     DISCONTINUED: Stop Heparin 60 minutes before end of treatment     DISCONTINUED: lidocaine 1 % 0.5 mL     DISCONTINUED: lidocaine 1 % 0.5 mL     DISCONTINUED: albumin human 5 % injection  mL      34. Acute respiratory failure with hypoxia and hypercarbia (H)  J96.01 Sleep DME    J96.02 CANCELED: Sleep DME     CANCELED: Sleep DME      35. Hemodialysis-associated hypotension  I95.3 midodrine (PROAMATINE) 10 MG tablet     DISCONTINUED: midodrine (PROAMATINE) tablet 10 mg      36. Anxiety  F41.9 cloNIDine (CATAPRES) 0.1 MG tablet     OLANZapine zydis (ZYPREXA) 5 MG ODT      37. Hypothyroidism, unspecified type  E03.9  levothyroxine (SYNTHROID/LEVOTHROID) 25 MCG tablet     liothyronine (CYTOMEL) 5 MCG tablet      38. Constipation, unspecified constipation type  K59.00 senna-docusate (SENOKOT-S/PERICOLACE) 8.6-50 MG tablet      39. Musculoskeletal pain  M79.18 Lidocaine (LIDOCARE) 4 % Patch

## 2024-04-15 NOTE — DISCHARGE INSTRUCTIONS
_______________________________________________________________________________________________________________    A referral has been sent to [facility] for outpatient pulmonary rehab, they should call you to schedule but if they do not contact you within a few days after discharge, please reach out to them directly.     [facility] (Outpatient pulm rehab)  Phone: [phone], ext. [phone].  Fax: [phone]    Address: [address] [place] [address], [place] [address]    Updates on Medications    Stop taking at home:  Tacrolimus   Dapsone   Lasix   Protonix   Vitamin b12     Start taking:   Amiodarone 200mg daily   Apixaban (Eliquis) 2.5mg two times daily   Bactrim-take 1 tablet daily THREE TIMES A WEEK (MWF)   Calcium Carbonate- 1 tablet 3 times a day with meals  Clonidine (catapres) 0.1mg tablet at bedtime   Cyclosporine 125mg (5 capsules) two times daily (this replaced your tacrolimus, so when you get levels-will need to be a 12 hour level like your tacrolimus-take 12 hours before labs) Goal level 140-170   Flonase nasal spray-1 spray into nose daily   Levothyroxine (synthroid) 25mcg tablet daily  Lidocaine patch-1 patch to sore area on body every 24 hours   Liothyronine (cytomel) take 2.5mcg (1/2 tablet)  2 times day   Midodrine (proamatine) 10mg 2 times day  Olanzapine (zyprexa) take 5mg at beditme    Senna (senokot) take 1 tablet 2 times daily as needed for constipation   Prednisone 5mg tablet daily

## 2024-04-16 NOTE — TELEPHONE ENCOUNTER
Geovanna CORONA states that they had not received the message to re-fax paperwork for oxygen renewal. She will be faxing them again today. Please review!

## 2024-04-17 ENCOUNTER — TELEPHONE (OUTPATIENT)
Dept: TRANSPLANT | Facility: CLINIC | Age: 64
End: 2024-04-17
Payer: MEDICARE

## 2024-04-17 DIAGNOSIS — Z94.2 LUNG REPLACED BY TRANSPLANT (H): Primary | ICD-10-CM

## 2024-04-17 NOTE — TELEPHONE ENCOUNTER
Date of Admission:2/10/24  Date of Discharge: 4/15/24  Discharge diagnosis:   Acute on chronic hypoxic and hypercarbic respiratory failure requiring intubation 2/17-2/19 3/25-4/2, improved  Recurrent PNAs, concern for acute infection vs acute rejection  Summary of Discharge discharge home with home infusion for enteral feeding, Recommendations: - Follow up anxiety medications and need for them while on AVAPS with PCP, specifically clonidine as BP has been softer 100-110s SBP   - Follow up liothyronine + levothyroxine regimen should be re-evaluated (likely stop liothyronine, up-titrate levothyroxine) in post-hospitalization   - Follow up tolerance to AVAPS and VBG       Need for home health: Home Infusion  Need for pulmonary rehab: yes      Medications reviewed: yes  Contact numbers for coordinator and on call coordinator reviewed: yes, already has info    Next RTC date/orders placed: Seeing us in clinic next wed 4/24  Next lab draw date: 4/24    Addressed patient/family questions and concerns. yes   Comments: patient said she is doing well and had some questions on PRN medications that we went over. Patient states she is waiting to hear back from her PCP to get into see them in clinic.

## 2024-04-21 LAB — BACTERIA BRONCH: NO GROWTH

## 2024-04-24 ENCOUNTER — OFFICE VISIT (OUTPATIENT)
Dept: TRANSPLANT | Facility: CLINIC | Age: 64
End: 2024-04-24
Attending: INTERNAL MEDICINE
Payer: MEDICARE

## 2024-04-24 ENCOUNTER — LAB (OUTPATIENT)
Dept: LAB | Facility: CLINIC | Age: 64
End: 2024-04-24
Attending: STUDENT IN AN ORGANIZED HEALTH CARE EDUCATION/TRAINING PROGRAM
Payer: MEDICARE

## 2024-04-24 ENCOUNTER — ANCILLARY PROCEDURE (OUTPATIENT)
Dept: GENERAL RADIOLOGY | Facility: CLINIC | Age: 64
End: 2024-04-24
Attending: STUDENT IN AN ORGANIZED HEALTH CARE EDUCATION/TRAINING PROGRAM
Payer: MEDICARE

## 2024-04-24 ENCOUNTER — OFFICE VISIT (OUTPATIENT)
Dept: PULMONOLOGY | Facility: CLINIC | Age: 64
End: 2024-04-24
Payer: MEDICARE

## 2024-04-24 VITALS
SYSTOLIC BLOOD PRESSURE: 125 MMHG | WEIGHT: 88 LBS | HEART RATE: 92 BPM | BODY MASS INDEX: 16.19 KG/M2 | OXYGEN SATURATION: 95 % | DIASTOLIC BLOOD PRESSURE: 71 MMHG

## 2024-04-24 DIAGNOSIS — Z79.899 ENCOUNTER FOR LONG-TERM (CURRENT) USE OF HIGH-RISK MEDICATION: ICD-10-CM

## 2024-04-24 DIAGNOSIS — Z94.2 LUNG REPLACED BY TRANSPLANT (H): ICD-10-CM

## 2024-04-24 DIAGNOSIS — Z79.899 ENCOUNTER FOR LONG-TERM (CURRENT) USE OF HIGH-RISK MEDICATION: Primary | ICD-10-CM

## 2024-04-24 DIAGNOSIS — Z94.2 S/P LUNG TRANSPLANT (H): ICD-10-CM

## 2024-04-24 LAB
ALBUMIN SERPL BCG-MCNC: 4.7 G/DL (ref 3.5–5.2)
ALP SERPL-CCNC: 118 U/L (ref 40–150)
ALT SERPL W P-5'-P-CCNC: 24 U/L (ref 0–50)
ANION GAP SERPL CALCULATED.3IONS-SCNC: 19 MMOL/L (ref 7–15)
AST SERPL W P-5'-P-CCNC: 34 U/L (ref 0–45)
BASE EXCESS BLDV CALC-SCNC: 3.4 MMOL/L (ref -3–3)
BILIRUB SERPL-MCNC: 0.4 MG/DL
BUN SERPL-MCNC: 66.6 MG/DL (ref 8–23)
CALCIUM SERPL-MCNC: 10 MG/DL (ref 8.8–10.2)
CHLORIDE SERPL-SCNC: 90 MMOL/L (ref 98–107)
CREAT SERPL-MCNC: 5.04 MG/DL (ref 0.51–0.95)
CYCLOSPORINE BLD LC/MS/MS-MCNC: 181 UG/L (ref 50–400)
DEPRECATED HCO3 PLAS-SCNC: 29 MMOL/L (ref 22–29)
EGFRCR SERPLBLD CKD-EPI 2021: 9 ML/MIN/1.73M2
ERYTHROCYTE [DISTWIDTH] IN BLOOD BY AUTOMATED COUNT: 16.4 % (ref 10–15)
EXPTIME-PRE: 4.42 SEC
FEF2575-%PRED-PRE: 184 %
FEF2575-PRE: 3.75 L/SEC
FEF2575-PRED: 2.03 L/SEC
FEFMAX-%PRED-PRE: 79 %
FEFMAX-PRE: 4.81 L/SEC
FEFMAX-PRED: 6.04 L/SEC
FEV1-%PRED-PRE: 58 %
FEV1-PRE: 1.3 L
FEV1FEV6-PRE: 100 %
FEV1FEV6-PRED: 80 %
FEV1FVC-PRE: 99 %
FEV1FVC-PRED: 80 %
FIFMAX-PRE: 3.57 L/SEC
FVC-%PRED-PRE: 47 %
FVC-PRE: 1.31 L
FVC-PRED: 2.76 L
GLUCOSE SERPL-MCNC: 98 MG/DL (ref 70–99)
HCO3 BLDV-SCNC: 33 MMOL/L (ref 21–28)
HCT VFR BLD AUTO: 44.1 % (ref 35–47)
HGB BLD-MCNC: 13.7 G/DL (ref 11.7–15.7)
MAGNESIUM SERPL-MCNC: 2.7 MG/DL (ref 1.7–2.3)
MCH RBC QN AUTO: 33.3 PG (ref 26.5–33)
MCHC RBC AUTO-ENTMCNC: 31.1 G/DL (ref 31.5–36.5)
MCV RBC AUTO: 107 FL (ref 78–100)
O2/TOTAL GAS SETTING VFR VENT: 21 %
OXYHGB MFR BLDV: 37 % (ref 70–75)
PCO2 BLDV: 72 MM HG (ref 40–50)
PH BLDV: 7.27 [PH] (ref 7.32–7.43)
PLATELET # BLD AUTO: 254 10E3/UL (ref 150–450)
PO2 BLDV: 26 MM HG (ref 25–47)
POTASSIUM SERPL-SCNC: 4.7 MMOL/L (ref 3.4–5.3)
PROT SERPL-MCNC: 8.1 G/DL (ref 6.4–8.3)
RBC # BLD AUTO: 4.11 10E6/UL (ref 3.8–5.2)
SAO2 % BLDV: 37.4 % (ref 70–75)
SODIUM SERPL-SCNC: 138 MMOL/L (ref 135–145)
TME LAST DOSE: NORMAL H
TME LAST DOSE: NORMAL H
WBC # BLD AUTO: 6.7 10E3/UL (ref 4–11)

## 2024-04-24 PROCEDURE — 71046 X-RAY EXAM CHEST 2 VIEWS: CPT | Performed by: RADIOLOGY

## 2024-04-24 PROCEDURE — 83735 ASSAY OF MAGNESIUM: CPT | Performed by: PATHOLOGY

## 2024-04-24 PROCEDURE — G0463 HOSPITAL OUTPT CLINIC VISIT: HCPCS | Performed by: INTERNAL MEDICINE

## 2024-04-24 PROCEDURE — 99207 PR NO CHARGE LOS: CPT

## 2024-04-24 PROCEDURE — 99215 OFFICE O/P EST HI 40 MIN: CPT | Performed by: INTERNAL MEDICINE

## 2024-04-24 PROCEDURE — 86832 HLA CLASS I HIGH DEFIN QUAL: CPT | Performed by: INTERNAL MEDICINE

## 2024-04-24 PROCEDURE — 94375 RESPIRATORY FLOW VOLUME LOOP: CPT | Performed by: INTERNAL MEDICINE

## 2024-04-24 PROCEDURE — 80053 COMPREHEN METABOLIC PANEL: CPT | Performed by: PATHOLOGY

## 2024-04-24 PROCEDURE — 80158 DRUG ASSAY CYCLOSPORINE: CPT | Performed by: STUDENT IN AN ORGANIZED HEALTH CARE EDUCATION/TRAINING PROGRAM

## 2024-04-24 PROCEDURE — 99000 SPECIMEN HANDLING OFFICE-LAB: CPT | Performed by: PATHOLOGY

## 2024-04-24 PROCEDURE — 82805 BLOOD GASES W/O2 SATURATION: CPT | Performed by: PATHOLOGY

## 2024-04-24 PROCEDURE — 86833 HLA CLASS II HIGH DEFIN QUAL: CPT | Performed by: INTERNAL MEDICINE

## 2024-04-24 PROCEDURE — 87799 DETECT AGENT NOS DNA QUANT: CPT | Performed by: INTERNAL MEDICINE

## 2024-04-24 PROCEDURE — 87799 DETECT AGENT NOS DNA QUANT: CPT | Mod: 59 | Performed by: STUDENT IN AN ORGANIZED HEALTH CARE EDUCATION/TRAINING PROGRAM

## 2024-04-24 PROCEDURE — 85027 COMPLETE CBC AUTOMATED: CPT | Performed by: PATHOLOGY

## 2024-04-24 NOTE — PROGRESS NOTES
Transplant Coordinator Note    Reason for visit: Post lung transplant follow up visit   Coordinator: Present   Caregiver:  none    Health concerns addressed today:  1. Respiratory: stable  2. GI: Diarrhea improved  3. Blood gas reviewed- plan to use bipap in afternoon too   4. 88 lbs today - using protein supplements  5. Pulm rehab in the future    Activity/rehab: Up ad magalie  Oxygen needs: Room air  Pain management/RX: NA  Diabetic management: managed by PCP; checking twice per day  CMV status: D+/R+  Valcyte stopped: POD 8-90  EBV status: D+/R+  PJP prophylactic: Bactrim    COVID:  COVID-19 infection (yes/no, date of most recent positive test):   Status/instructions given about COVID-19 vaccine:     Pt Education: medications (use/dose/side effects), how/when to call coordinator, frequency of labs, s/s of infection/rejection, call prior to starting any new medications, lab/vital sign book    Health Maintenance:   Last colonoscopy:   Next colonoscopy due:   Dermatology:  Vaccinations this visit:     Labs, CXR, PFTs reviewed with patient  Medication record reviewed and reconciled  Questions and concerns addressed    Patient Instructions  1. Continue to hydrate with 60-70 oz fluids daily.  2. Continue to exercise daily or most days of the week.  3. Follow up with your primary care provider for annual gender health maintenance procedures.  4. Follow up with colonoscopy schedule.  5. Follow up with annual dermatology visits.  6. It doesn't seem like the COVID vaccine is working well in lung transplant patients. A number of lung transplant patients have gotten sick with COVID even after receiving the vaccines. Based on our recent experience, it can be life-threatening to get COVID  even after being vaccinated. Please continue to act like you did not get the COVID vaccine - social distancing, wearing a mask, good hand hygiene, etc. If the people around you are vaccinated, it will help reduce the risk of you getting COVID.  All members of your household should be vaccinated.  7. Try to use your bipap for 1-2 hours in the afternoon.     Next transplant clinic appointment:  1 month with CXR, labs and PFTs  Next lab draw:  pending cyclosporine    AVS printed at time of check out

## 2024-04-24 NOTE — LETTER
2024         RE: Sofie Rodriguez  [address] [place] [address]        Dear Colleague,    Thank you for referring your patient, Sofie Rodriguez, to the [facility] TRANSPLANT [facility]IC. Please see a copy of my visit note below.          [facility] Lung Transplant [facility]       DATE OF VISIT:  2024   Clinic location: [facility], [facility]      Reason for Visit    S/p lung transplantation    Lung TX HPI:    Sofie Rodriguez   : 1960   Transplant: 2022 (Lung)    Interval history     63 year old female with h/o COPD s/p BSLT () with course complicated by post-operative hemidiaphragm palsy, recurrent PNAs, positive DSA, EBV viremia, hypogammaglobulinemia, severe gastroparesis s/p G/J tube placement (22) and pyloric botox (23), GI bleed 2/2 pyloric ulcer, hemobilia s/p ERCP and MRCP, chronic diarrhea, recurrent C diff colitis, ESRD on iHD, recurrent falls with injuries (recent right hip fx s/p ORIF 2023), deconditioning, and FTT.  Admitted 2/10 for failure to thrive and initiation of TPN/lipids.  Emergent intubation - for hypoxic and hypercapneic respiratory failure and encephalopathy. Returned to ICU - and again 3/18-3/20 d/t tenuous respiratory status complicated by variable daytime NIPPV tolerance and hypervolemia with iHD limited d/t hypotension. Again transferred back to ICU 3/24 for intubation and bronch/BAL given hypoxic and hypercapneic respiratory failure. CT with extensive bilateral infiltrate and etiology likely multifactorial including volume overload and infection, possible mucous plugging, ACR or AMR less likely.  Extubated , no daytime hypoxia and hypercapnia remains stable with good adherence to NIPPV with sleep.  Tolerating PO, diarrhea more stable.  Progress acidosis and hypercapnia 4/3- despite excellent adherence to overnight NIPPV and adjustments in settings, eventually improved and stabilized  with bicarbonate bath adjustment per nephrology, still without daytime hypoxia.  Discharge to home 4/14 with home NIPPV device.    Since discharge, Sofie reports having done quite well.  She uses the AVAPS machine every night for about 6 to 8 hours.  She is not using it during daytime.  She denies fatigue.  She reports an improvement in her appetite and that her weight has been slowly improving.      ROS Pulmonary    A complete ROS was otherwise negative except as noted in the HPI.    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) 08/17/2022     Hepatitis 2017    Hep C, [facility]     History of blood transfusion      HTN (hypertension)      Infectious mononucleosis      Lung infection 11/30/2022     Osteopenia               Past Surgical History:   Procedure Laterality Date     BRONCHOSCOPY (RIGID OR FLEXIBLE), DIAGNOSTIC N/A 08/02/2022    Procedure: BRONCHOSCOPY, DIAGNOSTIC- inspection Bronch;  Surgeon: Kamala Lovell MD;  [facility]: [facility]     BRONCHOSCOPY (RIGID OR FLEXIBLE), DIAGNOSTIC N/A 09/13/2022    Procedure: INSPECTION BRONCHOSCOPY, WITH BRONCHOALVEOLAR LAVAGE;  Surgeon: Jose R Mccullough MD;  [facility]: [facility]     BRONCHOSCOPY (RIGID OR FLEXIBLE), DIAGNOSTIC N/A 11/09/2022    Procedure: BRONCHOSCOPY, WITH BRONCHOALVEOLAR LAVAGE AND BIOPSY;  Surgeon: Cesar Lima MD;  [facility]     BRONCHOSCOPY (RIGID OR FLEXIBLE), DIAGNOSTIC N/A 01/25/2023    Procedure: BRONCHOSCOPY, WITH BRONCHOALVEOLAR LAVAGE AND BIOPSY;  Surgeon: Mason Reddy MD;  [facility]     BRONCHOSCOPY (RIGID OR FLEXIBLE), DIAGNOSTIC N/A 04/19/2023    Procedure: BRONCHOSCOPY, WITH BRONCHOALVEOLAR LAVAGE AND BIOPSY;  Surgeon: Kamala Lovell MD;  [facility]     BRONCHOSCOPY (RIGID OR FLEXIBLE), DIAGNOSTIC N/A  07/12/2023    Procedure: BRONCHOSCOPY, WITH BRONCHOALVEOLAR LAVAGE AND BIOPSY;  Surgeon: Cesar Lima MD;  Location:  GI     BRONCHOSCOPY FLEXIBLE AND RIGID N/A 07/19/2022    Procedure: BRONCHOSCOPY inspection only;  Surgeon: Bob Liao MD;  Location:  GI     COLONOSCOPY  2015     CORONARY ANGIOGRAPHY ADULT ORDER       CV CORONARY ANGIOGRAM N/A 06/30/2021    Procedure: CV CORONARY ANGIOGRAM;  Surgeon: Alexander Cuellar MD;  Location:  HEART CARDIAC CATH LAB     CV RIGHT HEART CATH MEASUREMENTS RECORDED N/A 06/30/2021    Procedure: CV RIGHT HEART CATH;  Surgeon: Alexander Cuellar MD;  Location:  HEART CARDIAC CATH LAB     ENDOSCOPIC PERORAL MYOTOMY N/A 10/09/2023    Procedure: MYOTOMY, ESOPHAGUS, ENDOSCOPIC, ORAL APPROACH;  Surgeon: Gonzalez Navarro MD;  [facility]     ENDOSCOPIC RETROGRADE CHOLANGIOPANCREATOGRAM N/A 08/11/2022    Procedure: ENDOSCOPIC RETROGRADE CHOLANGIOPANCREATOGRAPHY WITH PANCREATIC DUCT NEEDLE KNIFE AND STENT PLACEMENT, BILE DUCT SPHINCTEROTOMY, BLOOD/DEBRIS REMOVAL AND STENT PLACEMENT;  Surgeon: Cosmo Arroyo MD;  [facility]     ENDOSCOPIC RETROGRADE CHOLANGIOPANCREATOGRAM N/A 10/07/2022    Procedure: ENDOSCOPIC RETROGRADE CHOLANGIOPANCREATOGRAPHY with biliary and pancreatic stent removal, debris removal;  Surgeon: Cosmo Arroyo MD;  Location:  OR     ENT SURGERY  1974    tonsillectomy     ENTEROSCOPY SMALL BOWEL N/A 08/11/2022    Procedure: SMALL BOWEL ENTEROSCOPY;  Surgeon: Cosmo Arroyo MD;  [facility]     ESOPHAGOGASTRODUODENOSCOPY, WITH NASOGASTRIC TUBE INSERTION N/A 07/01/2022    Procedure: ESOPHAGOGASTRODUODENOSCOPY, WITH NASOJEJUNAL TUBE INSERTION;  Surgeon: Ozzy Nickerson MD;  Location:  GI     ESOPHAGOSCOPY, GASTROSCOPY, DUODENOSCOPY (EGD), COMBINED N/A 08/03/2022    Procedure: ESOPHAGOGASTRODUODENOSCOPY (EGD);  Surgeon: Ira Andres MD;  Location:  GI     ESOPHAGOSCOPY, GASTROSCOPY, DUODENOSCOPY  (EGD), COMBINED N/A 01/25/2023    Procedure: ESOPHAGOGASTRODUODENOSCOPY (EGD) with botox injection;  Surgeon: Gonzalez Navarro MD;  [facility]     ESOPHAGOSCOPY, GASTROSCOPY, DUODENOSCOPY (EGD), COMBINED N/A 10/09/2023    Procedure: ESOPHAGOGASTRODUODENOSCOPY;  Surgeon: Gonzalez Navarro MD;  [facility]     HAND SURGERY       INSERT CHEST TUBE Right 09/13/2022    Procedure: Insert chest tube;  Surgeon: Jose R Mccullough MD;  [facility]     IR CVC TUNNEL PLACEMENT > 5 YRS OF AGE  09/26/2022     IR CVC TUNNEL PLACEMENT > 5 YRS OF AGE  02/14/2024     IR CVC TUNNEL REMOVAL RIGHT  3/6/2024     IR GASTRO JEJUNOSTOMY TUBE CHANGE  08/31/2022     IR GASTRO JEJUNOSTOMY TUBE CHANGE  12/21/2022     IR GASTRO JEJUNOSTOMY TUBE CHANGE  07/12/2023     IR GASTRO JEJUNOSTOMY TUBE CHANGE  08/18/2023     IR GASTRO JEJUNOSTOMY TUBE CHANGE  11/14/2023     IR GASTRO JEJUNOSTOMY TUBE CHANGE  4/8/2024     IR GASTRO JEJUNOSTOMY TUBE PLACEMENT  07/27/2022     IR THORACENTESIS  08/29/2022     LEEP TX, CERVICAL  04/07/2017    HETCOR III     LYMPH NODE BIOPSY Left 2005    Left axilla, benign- [place]     MIDLINE INSERTION - DOUBLE LUMEN Left 07/28/2022    20cm, Basilic vein     PICC DOUBLE LUMEN PLACEMENT Right 03/04/2024    5FR DL PICc, basiliv vein- L-36cm, 1cm out.     REPLACE GASTROJEJUNOSTOMY TUBE, PERCUTANEOUS  10/07/2022    Procedure: Replace Gastrojejunostomy Tube;  Surgeon: Cosmo Arroyo MD;  [facility]     THORACENTESIS Left 08/29/2022    Procedure: THORACENTESIS;  Surgeon: Bo Capone PA-C;  Location: [facility] OR     THORACENTESIS Left 09/13/2022    Procedure: Thoracentesis;  Surgeon: Jose R Mccullough MD;  [facility]     THROMBECTOMY UPPER EXTREMITY Left 07/02/2022    Procedure: LEFT RADIAL ARM THROMBECTOMY;  Surgeon: Christie Graham MD;  Location:  OR     TRANSPLANT LUNG RECIPIENT SINGLE X2 Bilateral 06/28/2022    Procedure: Clamshell Incision, Bilateral Sequential Lung Transplant, On  Cardiopulmonary Bypass, Flexible Bronchoscopy;  Surgeon: Sue Sunshine MD;  Location:  OR        Social History     Tobacco Use     Smoking status: Former     Current packs/day: 0.00     Average packs/day: 0.5 packs/day for 30.0 years (15.0 ttl pk-yrs)     Types: Cigarettes     Start date: 11/11/1990     Quit date: 11/11/2020     Years since quitting: 3.4     Smokeless tobacco: Never   Substance Use Topics     Alcohol use: Not Currently     Comment: Stopped drinking in 2020     Drug use: Not Currently     Types: Marijuana, Methamphetamines     Comment: hx:marijuana and methamphetamine-quit both unsure ?  2-3 yrs ago                  No family history on file.       Any family history of ILD:        Current Outpatient Medications   Medication Sig Dispense Refill     acetaminophen (TYLENOL) 500 MG tablet 500-1,000 mg by Per J Tube route 3 times daily as needed for mild pain       amiodarone (PACERONE) 200 MG tablet 1 tablet (200 mg) by Oral or Feeding Tube route daily 30 tablet 0     apixaban ANTICOAGULANT (ELIQUIS) 2.5 MG tablet Take 1 tablet (2.5 mg) by mouth 2 times daily 60 tablet 0     B Complex-C-Folic Acid (DIALYVITE) TABS 1 tablet by Per J Tube route every morning       calcium carbonate-vitamin D (CALTRATE) 600-10 MG-MCG per tablet 1 tablet by Per J Tube route 3 times daily (with meals) 90 tablet 0     Calcium Carbonate-Vitamin D 600-10 MG-MCG TABS 1 tablet by Per J Tube route 3 times daily 90 tablet 11     cloNIDine (CATAPRES) 0.1 MG tablet Take 1 tablet (0.1 mg) by mouth at bedtime 30 tablet 0     cycloSPORINE modified (GENERIC EQUIVALENT) 25 MG capsule Take 5 capsules (125 mg) by mouth 2 times daily 300 capsule 11     fluticasone (FLONASE) 50 MCG/ACT nasal spray Spray 1 spray into both nostrils daily 9.9 mL 1     levothyroxine (SYNTHROID/LEVOTHROID) 25 MCG tablet Take 1 tablet (25 mcg) by mouth daily 30 tablet 0     Lidocaine (LIDOCARE) 4 % Patch Place 1 patch onto the skin every 24 hours for 30  days To prevent lidocaine toxicity, patient should be patch free for 12 hrs daily. 30 patch 0     liothyronine (CYTOMEL) 5 MCG tablet Take 0.5 tablets (2.5 mcg) by mouth 2 times daily 15 tablet 0     loperamide (IMODIUM A-D) 2 MG tablet 1 tablet (2 mg) by Per J Tube route 4 times daily as needed for diarrhea       metoprolol tartrate (LOPRESSOR) 50 MG tablet 0.5 tablets (25 mg) by Per Feeding Tube route 2 times daily 60 tablet 11     midodrine (PROAMATINE) 10 MG tablet Take 1 tablet (10 mg) by mouth 2 times daily as needed (for map less than 65 or sbp less than 100 on HD days) 30 tablet 0     multivitamin (CENTRUM SILVER) tablet Take 1 tablet by mouth daily       OLANZapine zydis (ZYPREXA) 5 MG ODT Take 1 tablet (5 mg) by mouth at bedtime 30 tablet 0     predniSONE (DELTASONE) 5 MG tablet 1 tablet (5 mg) by Per J Tube route every morning 30 tablet 11     protein modular (PROSOURCE TF) LIQD 1 packet by Per Feeding Tube route daily 90 packet 3     senna-docusate (SENOKOT-S/PERICOLACE) 8.6-50 MG tablet Take 1 tablet by mouth 2 times daily as needed for constipation 300 tablet 0     sevelamer carbonate, RENVELA, 0.8 GM PACK Packet Take 1 packet (0.8 g) by mouth 3 times daily (with meals)       sulfamethoxazole-trimethoprim (BACTRIM) 400-80 MG tablet Take 1 tablet by mouth three times a week 30 tablet 2     No current facility-administered medications for this visit.                        Allergies   Allergen Reactions     Blood Transfusion Related (Informational Only) Other (See Comments)     Patient has a history of a clinically significant antibody against RBC antigens.  A delay in compatible RBCs may occur.      No Clinical Screening - See Comments Other (See Comments)     Patient has a history of a clinically significant antibody against RBC antigens.  A delay in compatible RBCs may occur.     Azithromycin Rash     12/1/22: Developed a rash that is not raised and looks diffuse in nature. It started in the groin and  up the back and has now worked its way up her chest into her face. Pt states that it has now started to itch. She is breathing and talking normally and denies any airway changes. Unclear what started rash. Pt noted feeling somewhat itchy yesterday.     Ganciclovir Rash     12/1/22: Developed a rash that is not raised and looks diffuse in nature. It started in the groin and up the back and has now worked its way up her chest into her face. Pt states that it has now started to itch. She is breathing and talking normally and denies any airway changes. Unclear what started rash. Pt noted feeling somewhat itchy yesterday.                 /71   Pulse 92   Wt 39.9 kg (88 lb)   SpO2 95%   BMI 16.19 kg/m       Body mass index is 16.19 kg/m .        Physical Examination      GENERAL APPEARANCE: Thin female, no distress.  EYES: PERRL, EOMI  HENT: Nasal mucosa with no edema and no hyperemia. No nasal polyps.  EARS: Canals clear, TMs normal  MOUTH: Oral mucosa is moist, without any lesions, no tonsillar enlargement, no oropharyngeal exudate.  NECK: supple, no masses, no thyromegaly.   LYMPHATICS: No significant axillary, cervical, or supraclavicular nodes.  RESP: normal percussion, good air flow throughout.  No crackles. No rhonchi. No wheezes.   CV: Normal S1, S2, regular rhythm, normal rate. No murmur.  No rub. No gallop. No LE edema.   ABDOMEN:  Bowel sounds normal, soft, nontender, no HSM or masses.   MS: extremities normal. No clubbing. No cyanosis.  SKIN: no rash on limited exam  NEURO: Mentation intact, speech normal, normal strength and tone, normal gait and stance            Results:    Significant lab results today: pH 7.27, pCO2 72    Lab Results   Component Value Date    WBC 6.7 04/24/2024    WBC 5.8 06/30/2021     Lab Results   Component Value Date    RBC 4.11 04/24/2024    RBC 4.09 06/30/2021     Lab Results   Component Value Date    HGB 13.7 04/24/2024    HGB 11.8 06/30/2021     Lab Results   Component  Value Date    HCT 44.1 04/24/2024    HCT 42.6 06/30/2021     Lab Results   Component Value Date     04/24/2024     06/30/2021     Lab Results   Component Value Date    MCH 33.3 04/24/2024    MCH 31.5 06/30/2021     Lab Results   Component Value Date    MCHC 31.1 04/24/2024    MCHC 30.3 06/30/2021     Lab Results   Component Value Date    RDW 16.4 04/24/2024    RDW 12.7 06/30/2021     Lab Results   Component Value Date     04/24/2024     06/30/2021        Last Comprehensive Metabolic Panel:  Sodium   Date Value Ref Range Status   04/24/2024 138 135 - 145 mmol/L Final     Comment:     Reference intervals for this test were updated on 09/26/2023 to more accurately reflect our healthy population. There may be differences in the flagging of prior results with similar values performed with this method. Interpretation of those prior results can be made in the context of the updated reference intervals.    06/30/2021 138 133 - 144 mmol/L Final     Potassium   Date Value Ref Range Status   04/24/2024 4.7 3.4 - 5.3 mmol/L Final   06/30/2021 4.4 3.4 - 5.3 mmol/L Final     Potassium Whole Blood   Date Value Ref Range Status   02/18/2024 2.8 (L) 3.4 - 5.3 mmol/L Final     Chloride   Date Value Ref Range Status   04/24/2024 90 (L) 98 - 107 mmol/L Final   06/30/2021 100 94 - 109 mmol/L Final     Chloride (External)   Date Value Ref Range Status   02/01/2024 105 98 - 107 mmol/L Final     Carbon Dioxide   Date Value Ref Range Status   06/30/2021 38 (H) 20 - 32 mmol/L Final     Carbon Dioxide (CO2)   Date Value Ref Range Status   04/24/2024 29 22 - 29 mmol/L Final   08/29/2022 44 (H) 20 - 32 mmol/L Final     Anion Gap   Date Value Ref Range Status   04/24/2024 19 (H) 7 - 15 mmol/L Final   08/29/2022 <1 (L) 3 - 14 mmol/L Final   06/30/2021 <1 (L) 3 - 14 mmol/L Final     Glucose   Date Value Ref Range Status   04/24/2024 98 70 - 99 mg/dL Final   08/29/2022 104 (H) 70 - 99 mg/dL Final   06/30/2021 117 (H) 70  - 99 mg/dL Final     GLUCOSE BY METER POCT   Date Value Ref Range Status   04/11/2024 163 (H) 70 - 99 mg/dL Final     Urea Nitrogen   Date Value Ref Range Status   04/24/2024 66.6 (H) 8.0 - 23.0 mg/dL Final   08/29/2022 54 (H) 7 - 30 mg/dL Final   06/30/2021 30 7 - 30 mg/dL Final     Creatinine   Date Value Ref Range Status   04/24/2024 5.04 (H) 0.51 - 0.95 mg/dL Final   06/30/2021 0.85 0.52 - 1.04 mg/dL Final     GFR Estimate   Date Value Ref Range Status   04/24/2024 9 (L) >60 mL/min/1.73m2 Final   06/30/2021 75 >60 mL/min/[1.73_m2] Final     Comment:     Non  GFR Calc  Starting 12/18/2018, serum creatinine based estimated GFR (eGFR) will be   calculated using the Chronic Kidney Disease Epidemiology Collaboration   (CKD-EPI) equation.       Calcium   Date Value Ref Range Status   04/24/2024 10.0 8.8 - 10.2 mg/dL Final   06/30/2021 9.6 8.5 - 10.1 mg/dL Final     Bilirubin Total   Date Value Ref Range Status   04/24/2024 0.4 <=1.2 mg/dL Final   06/14/2021 0.3 0.2 - 1.3 mg/dL Final     Alkaline Phosphatase   Date Value Ref Range Status   04/24/2024 118 40 - 150 U/L Final     Comment:     Reference intervals for this test were updated on 11/14/2023 to more accurately reflect our healthy population. There may be differences in the flagging of prior results with similar values performed with this method. Interpretation of those prior results can be made in the context of the updated reference intervals.   06/14/2021 87 40 - 150 U/L Final     ALT   Date Value Ref Range Status   04/24/2024 24 0 - 50 U/L Final     Comment:     Reference intervals for this test were updated on 6/12/2023 to more accurately reflect our healthy population. There may be differences in the flagging of prior results with similar values performed with this method. Interpretation of those prior results can be made in the context of the updated reference intervals.     06/14/2021 18 0 - 50 U/L Final     AST   Date Value Ref Range  "Status   04/24/2024 34 0 - 45 U/L Final     Comment:     Reference intervals for this test were updated on 6/12/2023 to more accurately reflect our healthy population. There may be differences in the flagging of prior results with similar values performed with this method. Interpretation of those prior results can be made in the context of the updated reference intervals.   06/14/2021 20 0 - 45 U/L Final               Most Recent [facility] Pulmonary Function Testing    FVC-Pred   Date Value Ref Range Status   04/24/2024 2.76 L      FVC-Pre   Date Value Ref Range Status   04/24/2024 1.31 L      FVC-%Pred-Pre   Date Value Ref Range Status   04/24/2024 47 %      FEV1-Pre   Date Value Ref Range Status   04/24/2024 1.30 L      FEV1-%Pred-Pre   Date Value Ref Range Status   04/24/2024 58 %      FEV1FVC-Pred   Date Value Ref Range Status   04/24/2024 80 %      FEV1FVC-Pre   Date Value Ref Range Status   04/24/2024 99 %      No results found for: \"94561\"  FEFMax-Pred   Date Value Ref Range Status   04/24/2024 6.04 L/sec      FEFMax-Pre   Date Value Ref Range Status   04/24/2024 4.81 L/sec      FEFMax-%Pred-Pre   Date Value Ref Range Status   04/24/2024 79 %      ExpTime-Pre   Date Value Ref Range Status   04/24/2024 4.42 sec      FIFMax-Pre   Date Value Ref Range Status   04/24/2024 3.57 L/sec      FEV1FEV6-Pred   Date Value Ref Range Status   04/24/2024 80 %      FEV1FEV6-Pre   Date Value Ref Range Status   04/24/2024 100 %            CXR: personally reviewed by me and demonstrates: Post bilateral lung transplant with chronic left pleural effusion versus lung base scarring.      Assessment and plan:         Graft function:      S/p lung transplant ( 6/28/2022 (Lung)    Graft function: Stable, improved from prehospitalization spirometry numbers.  Chest x-ray also demonstrates stability.     Immunosuppression:  AZA previously stopped 5/2023 d/t low ImmuKnow assay and EBV viremia.  ImmuKnow (2/27) remained low at 88.  ImmuKnow " (4/1) further decreased to 43.  Transitioned from [place] to CSA 3/11/24.     - CSA  Goal 120-140 (decreased 4/4 for ImmuKnow).  Level today pending  - Prednisone 5 mg daily     Prophylaxis:     - Bactrim qMWF for PJP ppx  - CMV ppx not currently indicated, D+/R+, CMV negative 3/18, repeat CMV monthly due 4/24     Positive DSA: AMR treatment deferred given frailty and stable PFTs.  DSA DQB2 decreased on 3/6 with 5667 mfi, and again decreased to 4960 on 3/23.  Most recent cell-free DNA (2/18) mildly elevated at 1.04 (concerning for possible rejection), which was increased from prior level of 0.12 (1/10).  IST increase deferred at that time per Dr. Gong.  S/p IVIG (2/27) for DSA (IgG WNL).  Immuknow as above.  Prospera (cell free DNA) 0.44 (3/18) suggests AMR less likely, follow    - Repeat DSA monthly and cell free DNA in 1 month.       EBV viremia: CT CAP (2/7) without lymphadenopathy.  Recent levels improving (3/18) 23k.  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  Notable weight loss (40# in a year) d/t diarrhea, GI dysmotility, and intolerance of enteral feeds (PEG/J tube in place), most recently on elemental formula.  Extensive OP eval and f/u with GI. S/p port placement for TPN and lipids. Continued for ~5 weeks inpatient without considerable improvement, transitioned back to TF.  C diff positive (3/13), on fidaxomicin.  Repeat (4/4) negative, enteric B/V panel also negative.  Loose stools persist, GI consulted 4/4.  Colonoscopy recommended by GI this admission, but pt. deferred d/t risk for reintubation and improvement in diarrhea today.    - Calorie/protein supplements to continue TID-QID to meet PO nutritional goals of weight  gain. Offered follow up with RD-patient declined today.     ESRD: Continue dialysis per Nephrology. Continue Midodrine for now.    Anxiety:    Continue low dose Clonidine.    Health care maintenance:     Annual influenza vaccination recommended per preventive care guidelines.  COVID vaccination recommended per guidelines  RSV vaccination-now approved for age > 60, and immunosuppressed individuals  Pneumonia vaccination per guidelines  Colonoscopy/annual preventive care through PCP  Annual dermatology follow up for skin cancer screening     PLAN FOR TODAY:    Graft function: Stable FEV 1-improved from before.  Continue nocturnal AVAPS and recommended that he should also be wearing AVAPS for 1 to 2 hours in the afternoon given persistent hypercapnia. She is agreeable.  Check cell free DNA and DSA at next visit.  IS: Drug levels of immunosuppressants were monitored for toxicity.)  Cyclosporine levels are pending today.  Ppx: Continue current regimen  For ESRD, continue HD per Nephrology.    RTC: 1 month with mp, CXR, and VBG.       Darian Gomez MD [facility]  Associate Professor of Medicine  Pulmonary, Allergy & Critical Care Division  [facility]  [facility] Lung Transplant Program     I spent a total of  45 minutes reviewing chart (previous notes), reviewing test results, reviewing chest x rays and CT scans, talking with and examining patient, formulating plan, adjusting medications, and documentation of my findings and plan on the day of the encounter. Time excludes time spent for PFT.     The longitudinal plan of care for post lung transplant and complications of post-transplant was addressed during this visit. Due to the added complexity in care, I will continue to support this patient in the subsequent management of this condition(s) and with the ongoing continuity of care of this condition         Transplant Coordinator Note    Reason for visit: Post lung transplant follow up visit    Coordinator: Present   Caregiver:  none    Health concerns addressed today:  1. Respiratory: stable  2. GI: Diarrhea improved  3. Blood gas reviewed- plan to use bipap in afternoon too   4. 88 lbs today - using protein supplements  5. Pulm rehab in the future    Activity/rehab: Up ad magalie  Oxygen needs: Room air  Pain management/RX: NA  Diabetic management: managed by PCP; checking twice per day  CMV status: D+/R+  Valcyte stopped: POD 8-90  EBV status: D+/R+  PJP prophylactic: Bactrim    COVID:  COVID-19 infection (yes/no, date of most recent positive test):   Status/instructions given about COVID-19 vaccine:     Pt Education: medications (use/dose/side effects), how/when to call coordinator, frequency of labs, s/s of infection/rejection, call prior to starting any new medications, lab/vital sign book    Health Maintenance:   Last colonoscopy:   Next colonoscopy due:   Dermatology:  Vaccinations this visit:     Labs, CXR, PFTs reviewed with patient  Medication record reviewed and reconciled  Questions and concerns addressed    Patient Instructions  1. Continue to hydrate with 60-70 oz fluids daily.  2. Continue to exercise daily or most days of the week.  3. Follow up with your primary care provider for annual gender health maintenance procedures.  4. Follow up with colonoscopy schedule.  5. Follow up with annual dermatology visits.  6. It doesn't seem like the COVID vaccine is working well in lung transplant patients. A number of lung transplant patients have gotten sick with COVID even after receiving the vaccines. Based on our recent experience, it can be life-threatening to get COVID  even after being vaccinated. Please continue to act like you did not get the COVID vaccine - social distancing, wearing a mask, good hand hygiene, etc. If the people around you are vaccinated, it will help reduce the risk of you getting COVID. All members of your household should be vaccinated.  7. Try to use your bipap for 1-2  hours in the afternoon.     Next transplant clinic appointment:  1 month with CXR, labs and PFTs  Next lab draw:  pending cyclosporine    AVS printed at time of check out         Again, thank you for allowing me to participate in the care of your patient.        Sincerely,        Darian Gomez MD

## 2024-04-24 NOTE — PROGRESS NOTES
Sofie Rodriguez comes into clinic today at the request of Dr Gomez , for spirometry     Tolerated testing well. No adverse reactions. Left lab in no distress.        NAV ROMERO

## 2024-04-24 NOTE — PATIENT INSTRUCTIONS
Patient Instructions  1. Continue to hydrate with 60-70 oz fluids daily.  2. Continue to exercise daily or most days of the week.  3. Follow up with your primary care provider for annual gender health maintenance procedures.  4. Follow up with colonoscopy schedule.  5. Follow up with annual dermatology visits.  6. It doesn't seem like the COVID vaccine is working well in lung transplant patients. A number of lung transplant patients have gotten sick with COVID even after receiving the vaccines. Based on our recent experience, it can be life-threatening to get COVID  even after being vaccinated. Please continue to act like you did not get the COVID vaccine - social distancing, wearing a mask, good hand hygiene, etc. If the people around you are vaccinated, it will help reduce the risk of you getting COVID. All members of your household should be vaccinated.  7. Try to use your bipap for 1-2 hours in the afternoon.     Next transplant clinic appointment:  1 month with CXR, labs and PFTs  Next lab draw: pending cyclosporine    ~~~~~~~~~~~~~~~~~~~~~~~~~    Thoracic Transplant Office phone [phone] (alt [phone]), fax [phone]    Office Hours 8:30 - 5:00     For after-hours urgent issues, please dial [phone] (alt [phone]) and ask the  to page the Thoracic Transplant Coordinator On-Call.   --------------------  To expedite your medication refill(s), please contact your pharmacy and have them fax a refill request to: [phone]    *Please allow 3 business days for routine medication refills.  *Please allow 5 business days for controlled substance medication refills.    **For Diabetic medications and supplies refill(s), please contact your pharmacy and have them contact your Endocrine team.  --------------------  For scheduling appointments call [phone] (alt [phone])  --------------------  Please Note: If you are active on [employer], all future test results will be sent by [employer]  message only, and will no longer be called to patient. You may also receive communication directly from your physician.

## 2024-04-24 NOTE — PROGRESS NOTES
[facility] Lung Transplant Program       DATE OF VISIT:  2024   Clinic location: [facility]      Reason for Visit    S/p lung transplantation    Lung TX HPI:    Sofie Rodriguez   : 1960   Transplant: 2022 (Lung)    Interval history     63 year old female with h/o COPD s/p BSLT () with course complicated by post-operative hemidiaphragm palsy, recurrent PNAs, positive DSA, EBV viremia, hypogammaglobulinemia, severe gastroparesis s/p G/J tube placement (22) and pyloric botox (23), GI bleed / pyloric ulcer, hemobilia s/p ERCP and MRCP, chronic diarrhea, recurrent C diff colitis, ESRD on iHD, recurrent falls with injuries (recent right hip fx s/p ORIF 2023), deconditioning, and FTT.  Admitted 2/10 for failure to thrive and initiation of TPN/lipids.  Emergent intubation - for hypoxic and hypercapneic respiratory failure and encephalopathy. Returned to ICU - and again 3/18-3/20 d/t tenuous respiratory status complicated by variable daytime NIPPV tolerance and hypervolemia with iHD limited d/t hypotension. Again transferred back to ICU 3/24 for intubation and bronch/BAL given hypoxic and hypercapneic respiratory failure. CT with extensive bilateral infiltrate and etiology likely multifactorial including volume overload and infection, possible mucous plugging, ACR or AMR less likely.  Extubated , no daytime hypoxia and hypercapnia remains stable with good adherence to NIPPV with sleep.  Tolerating PO, diarrhea more stable.  Progress acidosis and hypercapnia 4/3- despite excellent adherence to overnight NIPPV and adjustments in settings, eventually improved and stabilized with bicarbonate bath adjustment per nephrology, still without daytime hypoxia.  Discharge to home  with home NIPPV device.    Since discharge, Sofie reports having done quite well.  She uses the AVAPS machine every night for about 6 to 8 hours.   She is not using it during daytime.  She denies fatigue.  She reports an improvement in her appetite and that her weight has been slowly improving.      ROS Pulmonary    A complete ROS was otherwise negative except as noted in the HPI.    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) 08/17/2022    Hepatitis 2017    Hep C, [facility]    History of blood transfusion     HTN (hypertension)     Infectious mononucleosis     Lung infection 11/30/2022    Osteopenia               Past Surgical History:   Procedure Laterality Date    BRONCHOSCOPY (RIGID OR FLEXIBLE), DIAGNOSTIC N/A 08/02/2022    Procedure: BRONCHOSCOPY, DIAGNOSTIC- inspection Bronch;  Surgeon: Kamala Lovell MD;  Location: [facility]    BRONCHOSCOPY (RIGID OR FLEXIBLE), DIAGNOSTIC N/A 09/13/2022    Procedure: INSPECTION BRONCHOSCOPY, WITH BRONCHOALVEOLAR LAVAGE;  Surgeon: Jose R Mccullough MD;  [facility]: [facility]    BRONCHOSCOPY (RIGID OR FLEXIBLE), DIAGNOSTIC N/A 11/09/2022    Procedure: BRONCHOSCOPY, WITH BRONCHOALVEOLAR LAVAGE AND BIOPSY;  Surgeon: Cesar Lima MD;  [facility]: [facility]    BRONCHOSCOPY (RIGID OR FLEXIBLE), DIAGNOSTIC N/A 01/25/2023    Procedure: BRONCHOSCOPY, WITH BRONCHOALVEOLAR LAVAGE AND BIOPSY;  Surgeon: Mason Reddy MD;  [facility]    BRONCHOSCOPY (RIGID OR FLEXIBLE), DIAGNOSTIC N/A 04/19/2023    Procedure: BRONCHOSCOPY, WITH BRONCHOALVEOLAR LAVAGE AND BIOPSY;  Surgeon: Kamala Lovell MD;  [facility]    BRONCHOSCOPY (RIGID OR FLEXIBLE), DIAGNOSTIC N/A 07/12/2023    Procedure: BRONCHOSCOPY, WITH BRONCHOALVEOLAR LAVAGE AND BIOPSY;  Surgeon: Cesar Lima MD;  [facility]: [facility]    BRONCHOSCOPY FLEXIBLE AND RIGID N/A 07/19/2022    Procedure: BRONCHOSCOPY inspection only;  Surgeon: Bob Liao MD;  [facility]: [facility]     COLONOSCOPY  2015    CORONARY ANGIOGRAPHY ADULT ORDER      CV CORONARY ANGIOGRAM N/A 06/30/2021    Procedure: CV CORONARY ANGIOGRAM;  Surgeon: Alexander Cuellar MD;  Location:  HEART CARDIAC CATH LAB    CV RIGHT HEART CATH MEASUREMENTS RECORDED N/A 06/30/2021    Procedure: CV RIGHT HEART CATH;  Surgeon: Alexander Cuellar MD;  Location:  HEART CARDIAC CATH LAB    ENDOSCOPIC PERORAL MYOTOMY N/A 10/09/2023    Procedure: MYOTOMY, ESOPHAGUS, ENDOSCOPIC, ORAL APPROACH;  Surgeon: Gonzalez Navarro MD;  [facility]    ENDOSCOPIC RETROGRADE CHOLANGIOPANCREATOGRAM N/A 08/11/2022    Procedure: ENDOSCOPIC RETROGRADE CHOLANGIOPANCREATOGRAPHY WITH PANCREATIC DUCT NEEDLE KNIFE AND STENT PLACEMENT, BILE DUCT SPHINCTEROTOMY, BLOOD/DEBRIS REMOVAL AND STENT PLACEMENT;  Surgeon: Cosmo Arroyo MD;  [facility]    ENDOSCOPIC RETROGRADE CHOLANGIOPANCREATOGRAM N/A 10/07/2022    Procedure: ENDOSCOPIC RETROGRADE CHOLANGIOPANCREATOGRAPHY with biliary and pancreatic stent removal, debris removal;  Surgeon: Cosmo Arroyo MD;  Location:  OR    ENT SURGERY  1974    tonsillectomy    ENTEROSCOPY SMALL BOWEL N/A 08/11/2022    Procedure: SMALL BOWEL ENTEROSCOPY;  Surgeon: Cosmo Arroyo MD;  [facility]    ESOPHAGOGASTRODUODENOSCOPY, WITH NASOGASTRIC TUBE INSERTION N/A 07/01/2022    Procedure: ESOPHAGOGASTRODUODENOSCOPY, WITH NASOJEJUNAL TUBE INSERTION;  Surgeon: Ozzy Nickerson MD;  Location:  GI    ESOPHAGOSCOPY, GASTROSCOPY, DUODENOSCOPY (EGD), COMBINED N/A 08/03/2022    Procedure: ESOPHAGOGASTRODUODENOSCOPY (EGD);  Surgeon: Ira Andres MD;  Location:  GI    ESOPHAGOSCOPY, GASTROSCOPY, DUODENOSCOPY (EGD), COMBINED N/A 01/25/2023    Procedure: ESOPHAGOGASTRODUODENOSCOPY (EGD) with botox injection;  Surgeon: Gonzalez Navarro MD;  Location:  GI    ESOPHAGOSCOPY, GASTROSCOPY, DUODENOSCOPY (EGD), COMBINED N/A 10/09/2023    Procedure: ESOPHAGOGASTRODUODENOSCOPY;  Surgeon: Ramon  MD Gonzalez;  [facility]    HAND SURGERY      INSERT CHEST TUBE Right 09/13/2022    Procedure: Insert chest tube;  Surgeon: Jose R Mccullough MD;  [facility]    IR CVC TUNNEL PLACEMENT > 5 YRS OF AGE  09/26/2022    IR CVC TUNNEL PLACEMENT > 5 YRS OF AGE  02/14/2024    IR CVC TUNNEL REMOVAL RIGHT  3/6/2024    IR GASTRO JEJUNOSTOMY TUBE CHANGE  08/31/2022    IR GASTRO JEJUNOSTOMY TUBE CHANGE  12/21/2022    IR GASTRO JEJUNOSTOMY TUBE CHANGE  07/12/2023    IR GASTRO JEJUNOSTOMY TUBE CHANGE  08/18/2023    IR GASTRO JEJUNOSTOMY TUBE CHANGE  11/14/2023    IR GASTRO JEJUNOSTOMY TUBE CHANGE  4/8/2024    IR GASTRO JEJUNOSTOMY TUBE PLACEMENT  07/27/2022    IR THORACENTESIS  08/29/2022    LEEP TX, CERVICAL  04/07/2017    HECTOR III    LYMPH NODE BIOPSY Left 2005    Left axilla, benign- [facility]    MIDLINE INSERTION - DOUBLE LUMEN Left 07/28/2022    20cm, Basilic vein    PICC DOUBLE LUMEN PLACEMENT Right 03/04/2024    5FR DL PICc, basiliv vein- L-36cm, 1cm out.    REPLACE GASTROJEJUNOSTOMY TUBE, PERCUTANEOUS  10/07/2022    Procedure: Replace Gastrojejunostomy Tube;  Surgeon: Cosmo Arroyo MD;  [facility]    THORACENTESIS Left 08/29/2022    Procedure: THORACENTESIS;  Surgeon: Bo Capone PA-C;  Location: [facility] OR    THORACENTESIS Left 09/13/2022    Procedure: Thoracentesis;  Surgeon: Jose R Mccullough MD;  [facility]: [facility]    THROMBECTOMY UPPER EXTREMITY Left 07/02/2022    Procedure: LEFT RADIAL ARM THROMBECTOMY;  Surgeon: Christie Graham MD;  [facility]    TRANSPLANT LUNG RECIPIENT SINGLE X2 Bilateral 06/28/2022    Procedure: Clamshell Incision, Bilateral Sequential Lung Transplant, On Cardiopulmonary Bypass, Flexible Bronchoscopy;  Surgeon: Sue Sunshine MD;  [facility]        Social History     Tobacco Use    Smoking status: Former     Current packs/day: 0.00     Average packs/day: 0.5 packs/day for 30.0 years (15.0 ttl pk-yrs)     Types: Cigarettes     Start date: 11/11/1990      Quit date: 11/11/2020     Years since quitting: 3.4    Smokeless tobacco: Never   Substance Use Topics    Alcohol use: Not Currently     Comment: Stopped drinking in 2020    Drug use: Not Currently     Types: Marijuana, Methamphetamines     Comment: hx:marijuana and methamphetamine-quit both unsure ?  2-3 yrs ago                  No family history on file.       Any family history of ILD:        Current Outpatient Medications   Medication Sig Dispense Refill    acetaminophen (TYLENOL) 500 MG tablet 500-1,000 mg by Per J Tube route 3 times daily as needed for mild pain      amiodarone (PACERONE) 200 MG tablet 1 tablet (200 mg) by Oral or Feeding Tube route daily 30 tablet 0    apixaban ANTICOAGULANT (ELIQUIS) 2.5 MG tablet Take 1 tablet (2.5 mg) by mouth 2 times daily 60 tablet 0    B Complex-C-Folic Acid (DIALYVITE) TABS 1 tablet by Per J Tube route every morning      calcium carbonate-vitamin D (CALTRATE) 600-10 MG-MCG per tablet 1 tablet by Per J Tube route 3 times daily (with meals) 90 tablet 0    Calcium Carbonate-Vitamin D 600-10 MG-MCG TABS 1 tablet by Per J Tube route 3 times daily 90 tablet 11    cloNIDine (CATAPRES) 0.1 MG tablet Take 1 tablet (0.1 mg) by mouth at bedtime 30 tablet 0    cycloSPORINE modified (GENERIC EQUIVALENT) 25 MG capsule Take 5 capsules (125 mg) by mouth 2 times daily 300 capsule 11    fluticasone (FLONASE) 50 MCG/ACT nasal spray Spray 1 spray into both nostrils daily 9.9 mL 1    levothyroxine (SYNTHROID/LEVOTHROID) 25 MCG tablet Take 1 tablet (25 mcg) by mouth daily 30 tablet 0    Lidocaine (LIDOCARE) 4 % Patch Place 1 patch onto the skin every 24 hours for 30 days To prevent lidocaine toxicity, patient should be patch free for 12 hrs daily. 30 patch 0    liothyronine (CYTOMEL) 5 MCG tablet Take 0.5 tablets (2.5 mcg) by mouth 2 times daily 15 tablet 0    loperamide (IMODIUM A-D) 2 MG tablet 1 tablet (2 mg) by Per J Tube route 4 times daily as needed for diarrhea      metoprolol  tartrate (LOPRESSOR) 50 MG tablet 0.5 tablets (25 mg) by Per Feeding Tube route 2 times daily 60 tablet 11    midodrine (PROAMATINE) 10 MG tablet Take 1 tablet (10 mg) by mouth 2 times daily as needed (for map less than 65 or sbp less than 100 on HD days) 30 tablet 0    multivitamin (CENTRUM SILVER) tablet Take 1 tablet by mouth daily      OLANZapine zydis (ZYPREXA) 5 MG ODT Take 1 tablet (5 mg) by mouth at bedtime 30 tablet 0    predniSONE (DELTASONE) 5 MG tablet 1 tablet (5 mg) by Per J Tube route every morning 30 tablet 11    protein modular (PROSOURCE TF) LIQD 1 packet by Per Feeding Tube route daily 90 packet 3    senna-docusate (SENOKOT-S/PERICOLACE) 8.6-50 MG tablet Take 1 tablet by mouth 2 times daily as needed for constipation 300 tablet 0    sevelamer carbonate, RENVELA, 0.8 GM PACK Packet Take 1 packet (0.8 g) by mouth 3 times daily (with meals)      sulfamethoxazole-trimethoprim (BACTRIM) 400-80 MG tablet Take 1 tablet by mouth three times a week 30 tablet 2     No current facility-administered medications for this visit.                        Allergies   Allergen Reactions    Blood Transfusion Related (Informational Only) Other (See Comments)     Patient has a history of a clinically significant antibody against RBC antigens.  A delay in compatible RBCs may occur.     No Clinical Screening - See Comments Other (See Comments)     Patient has a history of a clinically significant antibody against RBC antigens.  A delay in compatible RBCs may occur.    Azithromycin Rash     12/1/22: Developed a rash that is not raised and looks diffuse in nature. It started in the groin and up the back and has now worked its way up her chest into her face. Pt states that it has now started to itch. She is breathing and talking normally and denies any airway changes. Unclear what started rash. Pt noted feeling somewhat itchy yesterday.    Ganciclovir Rash     12/1/22: Developed a rash that is not raised and looks diffuse  in nature. It started in the groin and up the back and has now worked its way up her chest into her face. Pt states that it has now started to itch. She is breathing and talking normally and denies any airway changes. Unclear what started rash. Pt noted feeling somewhat itchy yesterday.                 /71   Pulse 92   Wt 39.9 kg (88 lb)   SpO2 95%   BMI 16.19 kg/m       Body mass index is 16.19 kg/m .        Physical Examination      GENERAL APPEARANCE: Thin female, no distress.  EYES: PERRL, EOMI  HENT: Nasal mucosa with no edema and no hyperemia. No nasal polyps.  EARS: Canals clear, TMs normal  MOUTH: Oral mucosa is moist, without any lesions, no tonsillar enlargement, no oropharyngeal exudate.  NECK: supple, no masses, no thyromegaly.   LYMPHATICS: No significant axillary, cervical, or supraclavicular nodes.  RESP: normal percussion, good air flow throughout.  No crackles. No rhonchi. No wheezes.   CV: Normal S1, S2, regular rhythm, normal rate. No murmur.  No rub. No gallop. No LE edema.   ABDOMEN:  Bowel sounds normal, soft, nontender, no HSM or masses.   MS: extremities normal. No clubbing. No cyanosis.  SKIN: no rash on limited exam  NEURO: Mentation intact, speech normal, normal strength and tone, normal gait and stance            Results:    Significant lab results today: pH 7.27, pCO2 72    Lab Results   Component Value Date    WBC 6.7 04/24/2024    WBC 5.8 06/30/2021     Lab Results   Component Value Date    RBC 4.11 04/24/2024    RBC 4.09 06/30/2021     Lab Results   Component Value Date    HGB 13.7 04/24/2024    HGB 11.8 06/30/2021     Lab Results   Component Value Date    HCT 44.1 04/24/2024    HCT 42.6 06/30/2021     Lab Results   Component Value Date     04/24/2024     06/30/2021     Lab Results   Component Value Date    MCH 33.3 04/24/2024    MCH 31.5 06/30/2021     Lab Results   Component Value Date    MCHC 31.1 04/24/2024    MCHC 30.3 06/30/2021     Lab Results    Component Value Date    RDW 16.4 04/24/2024    RDW 12.7 06/30/2021     Lab Results   Component Value Date     04/24/2024     06/30/2021        Last Comprehensive Metabolic Panel:  Sodium   Date Value Ref Range Status   04/24/2024 138 135 - 145 mmol/L Final     Comment:     Reference intervals for this test were updated on 09/26/2023 to more accurately reflect our healthy population. There may be differences in the flagging of prior results with similar values performed with this method. Interpretation of those prior results can be made in the context of the updated reference intervals.    06/30/2021 138 133 - 144 mmol/L Final     Potassium   Date Value Ref Range Status   04/24/2024 4.7 3.4 - 5.3 mmol/L Final   06/30/2021 4.4 3.4 - 5.3 mmol/L Final     Potassium Whole Blood   Date Value Ref Range Status   02/18/2024 2.8 (L) 3.4 - 5.3 mmol/L Final     Chloride   Date Value Ref Range Status   04/24/2024 90 (L) 98 - 107 mmol/L Final   06/30/2021 100 94 - 109 mmol/L Final     Chloride (External)   Date Value Ref Range Status   02/01/2024 105 98 - 107 mmol/L Final     Carbon Dioxide   Date Value Ref Range Status   06/30/2021 38 (H) 20 - 32 mmol/L Final     Carbon Dioxide (CO2)   Date Value Ref Range Status   04/24/2024 29 22 - 29 mmol/L Final   08/29/2022 44 (H) 20 - 32 mmol/L Final     Anion Gap   Date Value Ref Range Status   04/24/2024 19 (H) 7 - 15 mmol/L Final   08/29/2022 <1 (L) 3 - 14 mmol/L Final   06/30/2021 <1 (L) 3 - 14 mmol/L Final     Glucose   Date Value Ref Range Status   04/24/2024 98 70 - 99 mg/dL Final   08/29/2022 104 (H) 70 - 99 mg/dL Final   06/30/2021 117 (H) 70 - 99 mg/dL Final     GLUCOSE BY METER POCT   Date Value Ref Range Status   04/11/2024 163 (H) 70 - 99 mg/dL Final     Urea Nitrogen   Date Value Ref Range Status   04/24/2024 66.6 (H) 8.0 - 23.0 mg/dL Final   08/29/2022 54 (H) 7 - 30 mg/dL Final   06/30/2021 30 7 - 30 mg/dL Final     Creatinine   Date Value Ref Range Status    04/24/2024 5.04 (H) 0.51 - 0.95 mg/dL Final   06/30/2021 0.85 0.52 - 1.04 mg/dL Final     GFR Estimate   Date Value Ref Range Status   04/24/2024 9 (L) >60 mL/min/1.73m2 Final   06/30/2021 75 >60 mL/min/[1.73_m2] Final     Comment:     Non  GFR Calc  Starting 12/18/2018, serum creatinine based estimated GFR (eGFR) will be   calculated using the Chronic Kidney Disease Epidemiology Collaboration   (CKD-EPI) equation.       Calcium   Date Value Ref Range Status   04/24/2024 10.0 8.8 - 10.2 mg/dL Final   06/30/2021 9.6 8.5 - 10.1 mg/dL Final     Bilirubin Total   Date Value Ref Range Status   04/24/2024 0.4 <=1.2 mg/dL Final   06/14/2021 0.3 0.2 - 1.3 mg/dL Final     Alkaline Phosphatase   Date Value Ref Range Status   04/24/2024 118 40 - 150 U/L Final     Comment:     Reference intervals for this test were updated on 11/14/2023 to more accurately reflect our healthy population. There may be differences in the flagging of prior results with similar values performed with this method. Interpretation of those prior results can be made in the context of the updated reference intervals.   06/14/2021 87 40 - 150 U/L Final     ALT   Date Value Ref Range Status   04/24/2024 24 0 - 50 U/L Final     Comment:     Reference intervals for this test were updated on 6/12/2023 to more accurately reflect our healthy population. There may be differences in the flagging of prior results with similar values performed with this method. Interpretation of those prior results can be made in the context of the updated reference intervals.     06/14/2021 18 0 - 50 U/L Final     AST   Date Value Ref Range Status   04/24/2024 34 0 - 45 U/L Final     Comment:     Reference intervals for this test were updated on 6/12/2023 to more accurately reflect our healthy population. There may be differences in the flagging of prior results with similar values performed with this method. Interpretation of those prior results can be made in  "the context of the updated reference intervals.   06/14/2021 20 0 - 45 U/L Final               Most Recent [facility] Pulmonary Function Testing    FVC-Pred   Date Value Ref Range Status   04/24/2024 2.76 L      FVC-Pre   Date Value Ref Range Status   04/24/2024 1.31 L      FVC-%Pred-Pre   Date Value Ref Range Status   04/24/2024 47 %      FEV1-Pre   Date Value Ref Range Status   04/24/2024 1.30 L      FEV1-%Pred-Pre   Date Value Ref Range Status   04/24/2024 58 %      FEV1FVC-Pred   Date Value Ref Range Status   04/24/2024 80 %      FEV1FVC-Pre   Date Value Ref Range Status   04/24/2024 99 %      No results found for: \"20029\"  FEFMax-Pred   Date Value Ref Range Status   04/24/2024 6.04 L/sec      FEFMax-Pre   Date Value Ref Range Status   04/24/2024 4.81 L/sec      FEFMax-%Pred-Pre   Date Value Ref Range Status   04/24/2024 79 %      ExpTime-Pre   Date Value Ref Range Status   04/24/2024 4.42 sec      FIFMax-Pre   Date Value Ref Range Status   04/24/2024 3.57 L/sec      FEV1FEV6-Pred   Date Value Ref Range Status   04/24/2024 80 %      FEV1FEV6-Pre   Date Value Ref Range Status   04/24/2024 100 %            CXR: personally reviewed by me and demonstrates: Post bilateral lung transplant with chronic left pleural effusion versus lung base scarring.      Assessment and plan:         Graft function:      S/p lung transplant ( 6/28/2022 (Lung)    Graft function: Stable, improved from prehospitalization spirometry numbers.  Chest x-ray also demonstrates stability.     Immunosuppression:  AZA previously stopped 5/2023 d/t low ImmuKnow assay and EBV viremia.  ImmuKnow (2/27) remained low at 88.  ImmuKnow (4/1) further decreased to 43.  Transitioned from [place] to CSA 3/11/24.     - CSA  Goal 120-140 (decreased 4/4 for ImmuKnow).  Level today pending  - Prednisone 5 mg daily     Prophylaxis:     - Bactrim qMWF for PJP ppx  - CMV ppx not currently indicated, D+/R+, CMV negative 3/18, repeat CMV monthly due 4/24     Positive DSA: " AMR treatment deferred given frailty and stable PFTs.  DSA DQB2 decreased on 3/6 with 5667 mfi, and again decreased to 4960 on 3/23.  Most recent cell-free DNA (2/18) mildly elevated at 1.04 (concerning for possible rejection), which was increased from prior level of 0.12 (1/10).  IST increase deferred at that time per Dr. Gong.  S/p IVIG (2/27) for DSA (IgG WNL).  Immuknow as above.  Prospera (cell free DNA) 0.44 (3/18) suggests AMR less likely, follow    - Repeat DSA monthly and cell free DNA in 1 month.       EBV viremia: CT CAP (2/7) without lymphadenopathy.  Recent levels improving (3/18) 23k.  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  Notable weight loss (40# in a year) d/t diarrhea, GI dysmotility, and intolerance of enteral feeds (PEG/J tube in place), most recently on elemental formula.  Extensive OP eval and f/u with GI. S/p port placement for TPN and lipids. Continued for ~5 weeks inpatient without considerable improvement, transitioned back to TF.  C diff positive (3/13), on fidaxomicin.  Repeat (4/4) negative, enteric B/V panel also negative.  Loose stools persist, GI consulted 4/4.  Colonoscopy recommended by GI this admission, but pt. deferred d/t risk for reintubation and improvement in diarrhea today.    - Calorie/protein supplements to continue TID-QID to meet PO nutritional goals of weight gain. Offered follow up with RD-patient declined today.     ESRD: Continue dialysis per Nephrology. Continue Midodrine for now.    Anxiety:    Continue low dose Clonidine.    Health care maintenance:     Annual influenza vaccination recommended per preventive care guidelines.  COVID vaccination recommended per guidelines  RSV  vaccination-now approved for age > 60, and immunosuppressed individuals  Pneumonia vaccination per guidelines  Colonoscopy/annual preventive care through PCP  Annual dermatology follow up for skin cancer screening     PLAN FOR TODAY:    Graft function: Stable FEV 1-improved from before.  Continue nocturnal AVAPS and recommended that he should also be wearing AVAPS for 1 to 2 hours in the afternoon given persistent hypercapnia. She is agreeable.  Check cell free DNA and DSA at next visit.  IS: Drug levels of immunosuppressants were monitored for toxicity.)  Cyclosporine levels are pending today.  Ppx: Continue current regimen  Weight loss: She reports improvement in appetite only 2 meals per day. Gradual weight gain reported. Continue to monitor.  For ESRD, continue HD per Nephrology.    RTC: 1 month with mp, CXR, and VBG.       Darian Gomez MD [facility]  Associate Professor of Medicine  Pulmonary, Allergy & Critical Care Division  [facility]  [facility]     I spent a total of  45 minutes reviewing chart (previous notes), reviewing test results, reviewing chest x rays and CT scans, talking with and examining patient, formulating plan, adjusting medications, and documentation of my findings and plan on the day of the encounter. Time excludes time spent for PFT.     The longitudinal plan of care for post lung transplant and complications of post-transplant was addressed during this visit. Due to the added complexity in care, I will continue to support this patient in the subsequent management of this condition(s) and with the ongoing continuity of care of this condition

## 2024-04-25 LAB
CMV DNA SPEC NAA+PROBE-ACNC: NOT DETECTED IU/ML
CMV DNA SPEC NAA+PROBE-ACNC: NOT DETECTED IU/ML
EBV DNA SERPL NAA+PROBE-ACNC: NOT DETECTED IU/ML
EBV DNA SERPL NAA+PROBE-ACNC: NOT DETECTED IU/ML

## 2024-04-26 ENCOUNTER — TELEPHONE (OUTPATIENT)
Dept: TRANSPLANT | Facility: CLINIC | Age: 64
End: 2024-04-26
Payer: MEDICARE

## 2024-04-26 ENCOUNTER — TELEPHONE (OUTPATIENT)
Dept: PULMONOLOGY | Facility: CLINIC | Age: 64
End: 2024-04-26
Payer: MEDICARE

## 2024-04-26 DIAGNOSIS — Z94.2 LUNG TRANSPLANT STATUS, BILATERAL (H): ICD-10-CM

## 2024-04-26 RX ORDER — CYCLOSPORINE 25 MG/1
100 CAPSULE, LIQUID FILLED ORAL 2 TIMES DAILY
Qty: 240 CAPSULE | Refills: 11 | Status: SHIPPED | OUTPATIENT
Start: 2024-04-26 | End: 2024-05-09

## 2024-04-26 NOTE — TELEPHONE ENCOUNTER
Writer called back. Company needs updated prescription and provider notes for oxygen renewal per Medicare. Will fax requested documents to [phone]

## 2024-04-26 NOTE — TELEPHONE ENCOUNTER
[facility] Call Center    Phone Message    May a detailed message be left on voicemail: yes     Reason for Call: Other: Nurse Carrie called regarding the pt oxygen and was wondering if they can get a new Rx/Order to refill pt oxygen, Please advise ASAP. Call back number is [phone] Thank you       Action Taken: Other: Pulm    Travel Screening: Not Applicable

## 2024-04-26 NOTE — LETTER
PHYSICIAN ORDERS      DATE & TIME ISSUED: 2024 11:33 AM  PATIENT NAME: Sofie Rodriguez   : 1960     [facility] MR# [if applicable]: [id number]     DIAGNOSIS:  Lung Transplant  Z94.2  Please check Cyclosporine level week of 2024      Any questions please call: Girma [phone]    Please fax these results to [phone].       Dr. Darian Gomez MD, [facility]  Associate Professor of Medicine  Medical Director, Lung Transplantation Program Pulmonary,  Allergy, Critical Care & Sleep Division   [facility]

## 2024-04-26 NOTE — LETTER
PHYSICIAN ORDERS      DATE & TIME ISSUED: 2024 1:36 PM  PATIENT NAME: Sofie Rodriguez   : 1960     [facility] MR# [if applicable]: [id number]     DIAGNOSIS:  Lung Transplant  Z94.2    Please provide patient with home AVAPS machine and equipment.   Settings are: Tv 375, 12 minimum mmHg, max 25, and EPAP 5.   For use at naps and at least 7-8 hours overnight.     Provider notes and sleep study graph faxed separately.     Any questions please call: [phone]    Please fax these results to [phone].         Claribel Franks MD  Pulmonary Disease

## 2024-05-03 LAB
DONOR IDENTIFICATION: NORMAL
DQB2: 5912
DSA COMMENTS: NORMAL
DSA PRESENT: YES
DSA TEST METHOD: NORMAL
ORGAN: NORMAL
SA 1  COMMENTS: NORMAL
SA 1 CELL: NORMAL
SA 1 TEST METHOD: NORMAL
SA 2 CELL: NORMAL
SA 2 COMMENTS: NORMAL
SA 2 TEST METHOD: NORMAL
SA1 HI RISK ABY: NORMAL
SA1 MOD RISK ABY: NORMAL
SA2 HI RISK ABY: NORMAL
SA2 MOD RISK ABY: NORMAL
UNACCEPTABLE ANTIGENS: NORMAL
UNOS CPRA: 37

## 2024-05-07 ENCOUNTER — VIRTUAL VISIT (OUTPATIENT)
Dept: PHARMACY | Facility: CLINIC | Age: 64
End: 2024-05-07
Attending: STUDENT IN AN ORGANIZED HEALTH CARE EDUCATION/TRAINING PROGRAM
Payer: COMMERCIAL

## 2024-05-07 DIAGNOSIS — R19.7 DIARRHEA, UNSPECIFIED TYPE: ICD-10-CM

## 2024-05-07 DIAGNOSIS — D63.1 ANEMIA OF CHRONIC RENAL FAILURE, STAGE 3B (H): ICD-10-CM

## 2024-05-07 DIAGNOSIS — N18.32 ANEMIA OF CHRONIC RENAL FAILURE, STAGE 3B (H): ICD-10-CM

## 2024-05-07 DIAGNOSIS — N18.6 ESRD (END STAGE RENAL DISEASE) ON DIALYSIS (H): ICD-10-CM

## 2024-05-07 DIAGNOSIS — Z94.2 LUNG REPLACED BY TRANSPLANT (H): Primary | ICD-10-CM

## 2024-05-07 DIAGNOSIS — I48.0 PAROXYSMAL A-FIB (H): ICD-10-CM

## 2024-05-07 DIAGNOSIS — Z78.9 TAKES DIETARY SUPPLEMENTS: ICD-10-CM

## 2024-05-07 DIAGNOSIS — F41.1 GAD (GENERALIZED ANXIETY DISORDER): ICD-10-CM

## 2024-05-07 DIAGNOSIS — Z99.2 ESRD (END STAGE RENAL DISEASE) ON DIALYSIS (H): ICD-10-CM

## 2024-05-07 PROCEDURE — 99207 PR NO CHARGE LOS: CPT | Mod: 93 | Performed by: PHARMACIST

## 2024-05-07 NOTE — PATIENT INSTRUCTIONS
"Recommendations from today's MTM visit:                                                      I will talk to your transplant team about clonidine.    Follow-up: as needed    It was great speaking with you today.  I value your experience and would be very thankful for your time in providing feedback in our clinic survey. In the next few days, you may receive an email or text message from [facility] [employer] with a link to a survey related to your  clinical pharmacist.\"     To schedule another MTM appointment, please call the clinic directly or you may call the MTM scheduling line at [phone] or toll-free at [phone].     My Clinical Pharmacist's contact information:                                                      Please feel free to contact me with any questions or concerns you have.      Yung Rivera, PharmD  MTM Pharmacist    Phone: [phone]     "

## 2024-05-07 NOTE — Clinical Note
1. Patient states she does not have nighttime anxiety anymore from her home Bipap. Was feeling trapped from the hospital's BIPAP machine. Can we stop her clonidine? This was started in the hospital due to this issue.

## 2024-05-07 NOTE — PROGRESS NOTES
Medication Therapy Management (MTM) Encounter    ASSESSMENT:                            Medication Adherence/Access: No issues identified    Lung Transplant:    Stable.    Supplements:  Stable    Afib:  Stable.     ESRD:   Stable.     Diarrhea w/ recurrent C Dif:   Stable the last two weeks, although loose today. No changes today.     Anxiety  Anxiety well controlled, no longer feeling trapped by hospital's bipap machine. Will talk to team about discontinuing Clonidine.     PLAN:                            Txp team...  Patient states she does not have nighttime anxiety anymore from her home Bipap. Was feeling trapped from the hospital's BIPAP machine. Can we stop her clonidine? This was started in the hospital due to this issue.      Follow-up: as needed    SUBJECTIVE/OBJECTIVE:                          Sofie Rodriguez is a 63 year old female called for a transitions of care visit. She was discharged from [facility] on 4/15 for respiratory failure/ PNA.      Reason for visit: LISSET.    Allergies/ADRs: Reviewed in chart  Past Medical History: Reviewed in chart  Tobacco: She reports that she quit smoking about 3 years ago. Her smoking use included cigarettes. She started smoking about 33 years ago. She has a 15 pack-year smoking history. She has never used smokeless tobacco.  Alcohol: not currently using    Medication Adherence/Access: no issues reported    Lung Transplant:    Cyclosporine 100mg twice daily.  Prednisone 5mg daily.   Pt reports diarrhea.   Using oxygen at night only.   Transplant date: 6/28/22  PJP prophylaxis: Bactrim S S three times per week.  Tx Coordinator: Girma Sharpe, Using Med Card: Yes  Immunizations: annual flu shot 2022; Esypibqrn57:  2013, 2021; Prevnar 13/15/20: 2015; TDaP:  2015; Shingrix: 2021x2, HBV: immunity per last titer, COVID: Pfizer-BioNTech x4    Supplements:  Calcium/D 3 times daily.   MVI daily.     Afib:  Eliquis 2.5mg twice daily for stroke prevention. Denies gastrointestinal bleed  sx.  Amiodarone 200mg daily   Metoprolol 25mg twice daily  Patient reports no current medication side effects.    Patient does not have a history of GI bleed.   BP Readings from Last 3 Encounters:   04/24/24 125/71   04/15/24 101/63   02/07/24 128/69     ESRD:   Sevelamer 800mg 5x daily.   Calcium/D 3 times daily   Dialyvite daily.   Midodrine 10mg as needed for low BPs  Tues, Thurs, Sat    Diarrhea w/ recurrent C Dif:   Loperamide 2mg 3 times daily.   Last two weeks has had normal BMs. 1-2 times daily, but today did have some diarrhea.     Anxiety  Clonidine 0.1mg at bedtime- had anxiety wearing hospital's Bipap. Does not have any issues with her home one.   Olanzapine 5mg at bedtime.  Patient reports no current medication side effects.  Current symptoms include: no anxiety, well controlled since being home..    Today's Vitals: There were no vitals taken for this visit.  ----------------  Post Discharge Medication Reconciliation Status: discharge medications reconciled and changed, per note/orders.    I spent 15 minutes with this patient today. I offer these suggestions for consideration by txp team. A copy of the visit note was provided to the patient's provider(s).    A summary of these recommendations was sent via [employer].    Yung Rivera, PharmD  [facility] Pharmacist    Phone: [phone]     Telemedicine Visit Details  Type of service:  Telephone visit  Start Time: 12:52 PM  End Time: 1:07 PM     Medication Therapy Recommendations  NIRANJAN (generalized anxiety disorder)    Current Medication: cloNIDine (CATAPRES) 0.1 MG tablet   Rationale: No medical indication at this time - Unnecessary medication therapy - Indication   Recommendation: Discontinue Medication   Status: Contact Provider - Awaiting Response

## 2024-05-08 ENCOUNTER — LAB (OUTPATIENT)
Dept: LAB | Facility: CLINIC | Age: 64
End: 2024-05-08
Payer: MEDICARE

## 2024-05-08 DIAGNOSIS — Z94.2 LUNG REPLACED BY TRANSPLANT (H): ICD-10-CM

## 2024-05-08 PROCEDURE — 80158 DRUG ASSAY CYCLOSPORINE: CPT

## 2024-05-08 RX ORDER — ASCORBIC ACID, THIAMINE, RIBOFLAVIN, NIACINAMIDE, PYRIDOXINE, FOLIC ACID, COBALAMIN, BIOTIN, PANTOTHENIC ACID 100; 1.5; 1.7; 20; 10; 1; 6; 300; 1 MG/1; MG/1; MG/1; MG/1; MG/1; MG/1; UG/1; UG/1; MG/1
1 TABLET, COATED ORAL EVERY MORNING
Qty: 30 TABLET | Refills: 11 | Status: SHIPPED | OUTPATIENT
Start: 2024-05-08

## 2024-05-09 ENCOUNTER — TELEPHONE (OUTPATIENT)
Dept: TRANSPLANT | Facility: CLINIC | Age: 64
End: 2024-05-09
Payer: MEDICARE

## 2024-05-09 DIAGNOSIS — Z94.2 LUNG REPLACED BY TRANSPLANT (H): Primary | ICD-10-CM

## 2024-05-09 DIAGNOSIS — Z94.2 LUNG TRANSPLANT STATUS, BILATERAL (H): ICD-10-CM

## 2024-05-09 LAB
CYCLOSPORINE BLD LC/MS/MS-MCNC: 67 UG/L (ref 50–400)
TME LAST DOSE: NORMAL H
TME LAST DOSE: NORMAL H

## 2024-05-09 RX ORDER — CYCLOSPORINE 25 MG/1
CAPSULE, LIQUID FILLED ORAL
Qty: 270 CAPSULE | Refills: 11 | Status: SHIPPED | OUTPATIENT
Start: 2024-05-09 | End: 2024-05-15

## 2024-05-09 NOTE — TELEPHONE ENCOUNTER
CSA level 67 at 12 hours on 5/8  Dose increased to 100/125  Repeat level next week in clinic     Pt understanding of plan

## 2024-05-10 DIAGNOSIS — Z00.6 RESEARCH STUDY PATIENT: Primary | ICD-10-CM

## 2024-05-13 ENCOUNTER — MYC MEDICAL ADVICE (OUTPATIENT)
Dept: PULMONOLOGY | Facility: CLINIC | Age: 64
End: 2024-05-13
Payer: MEDICARE

## 2024-05-13 DIAGNOSIS — F41.9 ANXIETY: ICD-10-CM

## 2024-05-13 DIAGNOSIS — E03.9 HYPOTHYROIDISM, UNSPECIFIED TYPE: ICD-10-CM

## 2024-05-13 DIAGNOSIS — I48.0 PAROXYSMAL A-FIB (H): ICD-10-CM

## 2024-05-13 RX ORDER — LEVOTHYROXINE SODIUM 25 UG/1
25 TABLET ORAL DAILY
Qty: 30 TABLET | Refills: 4 | Status: SHIPPED | OUTPATIENT
Start: 2024-05-13

## 2024-05-13 RX ORDER — AMIODARONE HYDROCHLORIDE 200 MG/1
200 TABLET ORAL DAILY
Qty: 30 TABLET | Refills: 4 | Status: SHIPPED | OUTPATIENT
Start: 2024-05-13 | End: 2024-10-07

## 2024-05-13 RX ORDER — OLANZAPINE 5 MG/1
5 TABLET, ORALLY DISINTEGRATING ORAL AT BEDTIME
Qty: 30 TABLET | Refills: 0 | OUTPATIENT
Start: 2024-05-13

## 2024-05-13 RX ORDER — OLANZAPINE 5 MG/1
5 TABLET, ORALLY DISINTEGRATING ORAL AT BEDTIME
Qty: 30 TABLET | Refills: 4 | Status: SHIPPED | OUTPATIENT
Start: 2024-05-13 | End: 2024-06-26

## 2024-05-13 RX ORDER — AMIODARONE HYDROCHLORIDE 200 MG/1
200 TABLET ORAL DAILY
Qty: 30 TABLET | Refills: 0 | OUTPATIENT
Start: 2024-05-13

## 2024-05-13 RX ORDER — LEVOTHYROXINE SODIUM 25 UG/1
25 TABLET ORAL DAILY
Qty: 30 TABLET | Refills: 0 | OUTPATIENT
Start: 2024-05-13

## 2024-05-14 ENCOUNTER — OFFICE VISIT (OUTPATIENT)
Dept: PULMONOLOGY | Facility: CLINIC | Age: 64
End: 2024-05-14
Attending: INTERNAL MEDICINE
Payer: MEDICARE

## 2024-05-14 ENCOUNTER — TELEPHONE (OUTPATIENT)
Dept: TRANSPLANT | Facility: CLINIC | Age: 64
End: 2024-05-14

## 2024-05-14 ENCOUNTER — LAB (OUTPATIENT)
Dept: LAB | Facility: CLINIC | Age: 64
End: 2024-05-14
Attending: INTERNAL MEDICINE
Payer: MEDICARE

## 2024-05-14 ENCOUNTER — ANCILLARY PROCEDURE (OUTPATIENT)
Dept: GENERAL RADIOLOGY | Facility: CLINIC | Age: 64
End: 2024-05-14
Attending: INTERNAL MEDICINE
Payer: MEDICARE

## 2024-05-14 VITALS
OXYGEN SATURATION: 95 % | HEART RATE: 83 BPM | SYSTOLIC BLOOD PRESSURE: 118 MMHG | DIASTOLIC BLOOD PRESSURE: 69 MMHG | BODY MASS INDEX: 14.34 KG/M2 | HEIGHT: 63 IN | WEIGHT: 80.9 LBS

## 2024-05-14 DIAGNOSIS — Z94.2 LUNG REPLACED BY TRANSPLANT (H): Primary | ICD-10-CM

## 2024-05-14 DIAGNOSIS — Z94.2 LUNG REPLACED BY TRANSPLANT (H): ICD-10-CM

## 2024-05-14 DIAGNOSIS — Z00.6 RESEARCH STUDY PATIENT: ICD-10-CM

## 2024-05-14 LAB
ALBUMIN SERPL BCG-MCNC: 4.6 G/DL (ref 3.5–5.2)
ALP SERPL-CCNC: 115 U/L (ref 40–150)
ALT SERPL W P-5'-P-CCNC: 18 U/L (ref 0–50)
ANION GAP SERPL CALCULATED.3IONS-SCNC: 14 MMOL/L (ref 7–15)
AST SERPL W P-5'-P-CCNC: 32 U/L (ref 0–45)
BASE EXCESS BLDV CALC-SCNC: 6.4 MMOL/L (ref -3–3)
BILIRUB SERPL-MCNC: 0.4 MG/DL
BUN SERPL-MCNC: 26.2 MG/DL (ref 8–23)
CALCIUM SERPL-MCNC: 9.7 MG/DL (ref 8.8–10.2)
CHLORIDE SERPL-SCNC: 93 MMOL/L (ref 98–107)
CMV DNA SPEC NAA+PROBE-ACNC: NOT DETECTED IU/ML
CREAT SERPL-MCNC: 3.82 MG/DL (ref 0.51–0.95)
CYCLOSPORINE BLD LC/MS/MS-MCNC: 211 UG/L (ref 50–400)
DEPRECATED HCO3 PLAS-SCNC: 31 MMOL/L (ref 22–29)
EGFRCR SERPLBLD CKD-EPI 2021: 13 ML/MIN/1.73M2
ERYTHROCYTE [DISTWIDTH] IN BLOOD BY AUTOMATED COUNT: 15.8 % (ref 10–15)
GLUCOSE SERPL-MCNC: 141 MG/DL (ref 70–99)
HCO3 BLDV-SCNC: 36 MMOL/L (ref 21–28)
HCT VFR BLD AUTO: 44.4 % (ref 35–47)
HGB BLD-MCNC: 13.9 G/DL (ref 11.7–15.7)
MAGNESIUM SERPL-MCNC: 2.6 MG/DL (ref 1.7–2.3)
MCH RBC QN AUTO: 32.9 PG (ref 26.5–33)
MCHC RBC AUTO-ENTMCNC: 31.3 G/DL (ref 31.5–36.5)
MCV RBC AUTO: 105 FL (ref 78–100)
O2/TOTAL GAS SETTING VFR VENT: 21 %
OXYHGB MFR BLDV: 25 % (ref 70–75)
PCO2 BLDV: 76 MM HG (ref 40–50)
PH BLDV: 7.28 [PH] (ref 7.32–7.43)
PLATELET # BLD AUTO: 212 10E3/UL (ref 150–450)
PO2 BLDV: 21 MM HG (ref 25–47)
POTASSIUM SERPL-SCNC: 3.5 MMOL/L (ref 3.4–5.3)
PROT SERPL-MCNC: 8.2 G/DL (ref 6.4–8.3)
RBC # BLD AUTO: 4.22 10E6/UL (ref 3.8–5.2)
SAO2 % BLDV: 25.8 % (ref 70–75)
SODIUM SERPL-SCNC: 138 MMOL/L (ref 135–145)
TME LAST DOSE: NORMAL H
TME LAST DOSE: NORMAL H
WBC # BLD AUTO: 6.4 10E3/UL (ref 4–11)

## 2024-05-14 PROCEDURE — 86832 HLA CLASS I HIGH DEFIN QUAL: CPT | Performed by: INTERNAL MEDICINE

## 2024-05-14 PROCEDURE — 80158 DRUG ASSAY CYCLOSPORINE: CPT | Performed by: PHYSICIAN ASSISTANT

## 2024-05-14 PROCEDURE — 85027 COMPLETE CBC AUTOMATED: CPT | Performed by: PATHOLOGY

## 2024-05-14 PROCEDURE — G0463 HOSPITAL OUTPT CLINIC VISIT: HCPCS | Performed by: PHYSICIAN ASSISTANT

## 2024-05-14 PROCEDURE — 94375 RESPIRATORY FLOW VOLUME LOOP: CPT | Performed by: PHYSICIAN ASSISTANT

## 2024-05-14 PROCEDURE — 83735 ASSAY OF MAGNESIUM: CPT | Performed by: PATHOLOGY

## 2024-05-14 PROCEDURE — 99207 PR NO CHARGE LOS: CPT

## 2024-05-14 PROCEDURE — 71046 X-RAY EXAM CHEST 2 VIEWS: CPT | Performed by: RADIOLOGY

## 2024-05-14 PROCEDURE — 99000 SPECIMEN HANDLING OFFICE-LAB: CPT | Performed by: PATHOLOGY

## 2024-05-14 PROCEDURE — 99215 OFFICE O/P EST HI 40 MIN: CPT | Mod: 25 | Performed by: PHYSICIAN ASSISTANT

## 2024-05-14 PROCEDURE — 82805 BLOOD GASES W/O2 SATURATION: CPT | Performed by: PATHOLOGY

## 2024-05-14 PROCEDURE — 86833 HLA CLASS II HIGH DEFIN QUAL: CPT | Performed by: INTERNAL MEDICINE

## 2024-05-14 PROCEDURE — 80053 COMPREHEN METABOLIC PANEL: CPT | Performed by: PATHOLOGY

## 2024-05-14 ASSESSMENT — PAIN SCALES - GENERAL: PAINLEVEL: NO PAIN (0)

## 2024-05-14 NOTE — TELEPHONE ENCOUNTER
DATE:  5/14/2024     TIME OF RECEIPT FROM LAB:  11:46 am    ORDERING PROVIDER: Dr. Gomez    LAB TEST:  pCO2    LAB VALUE:  76    RESULTS GIVEN WITH READ-BACK TO (PROVIDER):  Lacey Roca RN for Girma Sharpe RN    TIME LAB VALUE REPORTED TO PROVIDER:   11:58

## 2024-05-14 NOTE — NURSING NOTE
Chief Complaint   Patient presents with    Lung Transplant     Rescheduled appt follow up      Vitals were taken and medications were reconciled.     Jeanne Ordonez RMA  12:47 PM

## 2024-05-14 NOTE — PATIENT INSTRUCTIONS
Patient Instructions  1. Continue to hydrate with 60-70 oz fluids daily.  2. Continue to exercise daily or most days of the week.  3. Follow up with your primary care provider for annual gender health maintenance procedures.  4. Follow up with colonoscopy schedule.  5. Follow up with annual dermatology visits.  6. It doesn't seem like the COVID vaccine is working well in lung transplant patients. A number of lung transplant patients have gotten sick with COVID even after receiving the vaccines. Based on our recent experience, it can be life-threatening to get COVID  even after being vaccinated. Please continue to act like you did not get the COVID vaccine - social distancing, wearing a mask, good hand hygiene, etc. If the people around you are vaccinated, it will help reduce the risk of you getting COVID. All members of your household should be vaccinated.  7. Make sure you wear your BIPAP 8 hours every night and if you nap during the day.   8. We will have Kera contact you.  9. We will place a referral for Palliative Care. Please call [phone] to set up an appointment.    10. We will call you later today or tomorrow to review your medications.

## 2024-05-14 NOTE — NURSING NOTE
Transplant Coordinator Note     Reason for visit: Post lung transplant follow up visit   Coordinator: Present   Caregiver:  none     Health concerns addressed today:  1. Respiratory: feels like breathing well. Does get winded after walking more than a block. Using BIPAP 6-8 hrs per day.   2. GI: Normal BM for 3 weeks. Using tube feedings. Most days using 2 cartons per day. Does get nauseous and crampy after too much tube feeds.     Activity/rehab: Up ad magalie  Oxygen needs: Room air  Pain management/RX: NA  Diabetic management: managed by PCP; checking twice per day  CMV status: D+/R+  Valcyte stopped: POD 8-90  EBV status: D+/R+  PJP prophylactic: Bactrim     COVID:  COVID-19 infection (yes/no, date of most recent positive test):   Status/instructions given about COVID-19 vaccine:      Pt Education: medications (use/dose/side effects), how/when to call coordinator, frequency of labs, s/s of infection/rejection, call prior to starting any new medications, lab/vital sign book     Health Maintenance:   Last colonoscopy:   Next colonoscopy due:   Dermatology:  Vaccinations this visit:      Labs, CXR, PFTs reviewed with patient  Medication record reviewed and reconciled  Questions and concerns addressed     Patient Instructions  1. Continue to hydrate with 60-70 oz fluids daily.  2. Continue to exercise daily or most days of the week.  3. Follow up with your primary care provider for annual gender health maintenance procedures.  4. Follow up with colonoscopy schedule.  5. Follow up with annual dermatology visits.  6. It doesn't seem like the COVID vaccine is working well in lung transplant patients. A number of lung transplant patients have gotten sick with COVID even after receiving the vaccines. Based on our recent experience, it can be life-threatening to get COVID  even after being vaccinated. Please continue to act like you did not get the COVID vaccine - social distancing, wearing a mask, good hand hygiene,  etc. If the people around you are vaccinated, it will help reduce the risk of you getting COVID. All members of your household should be vaccinated.  7. Make sure you wear your BIPAP 8 hours every night and if you nap during the day.   8. We will have Kera contact you.  9. We will place a referral for Palliative Care. Please call [phone] to set up an appointment.    10. We will call you later today or tomorrow to review your medications.      Next transplant clinic appointment:  1 month with labs, CXR, and PFT  Next lab draw:  pending cyclosporine     AVS printed at time of check out

## 2024-05-14 NOTE — TELEPHONE ENCOUNTER
Noted, reviewed in clinic with Kaylin. Inquiring with oxygen company about BiPAP if any settings can be adjusted to help C02.

## 2024-05-14 NOTE — TELEPHONE ENCOUNTER
Provider Call: General  Route to LPN    Reason for call: Jcarlos from [facility] calling Lacey back     Call back needed? Yes    Return Call Needed  Same as documented in contacts section  When to return call?: Same day: Route High Priority

## 2024-05-15 ENCOUNTER — TELEPHONE (OUTPATIENT)
Dept: TRANSPLANT | Facility: CLINIC | Age: 64
End: 2024-05-15

## 2024-05-15 DIAGNOSIS — Z94.2 LUNG TRANSPLANT STATUS, BILATERAL (H): ICD-10-CM

## 2024-05-15 DIAGNOSIS — Z94.2 LUNG REPLACED BY TRANSPLANT (H): Primary | ICD-10-CM

## 2024-05-15 LAB
EXPTIME-PRE: 3.74 SEC
FEF2575-%PRED-PRE: 129 %
FEF2575-PRE: 2.64 L/SEC
FEF2575-PRED: 2.03 L/SEC
FEFMAX-%PRED-PRE: 59 %
FEFMAX-PRE: 3.57 L/SEC
FEFMAX-PRED: 6.03 L/SEC
FEV1-%PRED-PRE: 51 %
FEV1-PRE: 1.13 L
FEV1FEV6-PRE: 99 %
FEV1FEV6-PRED: 80 %
FEV1FVC-PRE: 99 %
FEV1FVC-PRED: 80 %
FIFMAX-PRE: 3.85 L/SEC
FVC-%PRED-PRE: 41 %
FVC-PRE: 1.14 L
FVC-PRED: 2.76 L

## 2024-05-15 RX ORDER — CYCLOSPORINE 25 MG/1
100 CAPSULE, LIQUID FILLED ORAL 2 TIMES DAILY
Qty: 240 CAPSULE | Refills: 11 | Status: SHIPPED | OUTPATIENT
Start: 2024-05-15 | End: 2024-05-28

## 2024-05-15 NOTE — TELEPHONE ENCOUNTER
spoke with Sofie on the phone regarding her CSA level at 211 (goal is 120-140) currently taking 100/125 will DECREASE to 100/100 and recheck another level in one week. Sofie confirmed plan

## 2024-05-15 NOTE — LETTER
PHYSICIAN ORDERS      DATE & TIME ISSUED: May 15, 2024 4:55 PM  PATIENT NAME: Sofie Rodriguez   : 1960     [facility] MR# [if applicable]: [id number]     DIAGNOSIS:  Lung Transplant  Z94.2  Please check the following labs on week of 24:    Cyclosporine level (12 hour trough)  BMP  Magnesium  CBC with platelets      Any questions please call: [phone]    Please fax these results to [phone].         Clairbel Franks MD  Pulmonary Disease

## 2024-05-16 ENCOUNTER — TELEPHONE (OUTPATIENT)
Dept: TRANSPLANT | Facility: CLINIC | Age: 64
End: 2024-05-16
Payer: MEDICARE

## 2024-05-16 NOTE — TELEPHONE ENCOUNTER
"Called Sofie to offer any support regarding her nutrition. Her BMI is now down to 14. She continues on TF. Was admitted earlier this year for trial of TPN due to poor tolerance of TF, but appears TPN was not continued due to overfeeding, which impacted pulmonary status.     She continues to do 2-3 cartons formula/day (Simple-Fill Renal support) as tolerated. Eats 2x/day- for example cereal or a hamburger. Drinks water.     Briefly reviewed option to increase feeds, as she is tolerating her current regimen. She said \"I'll continue to give them as I'm able\".     Sofie doesn't have any nutrition questions/concerns that she would like to talk further about currently. Encouraged her to reach out if things change.       "

## 2024-05-17 ENCOUNTER — TELEPHONE (OUTPATIENT)
Dept: TRANSPLANT | Facility: CLINIC | Age: 64
End: 2024-05-17
Payer: MEDICARE

## 2024-05-17 NOTE — TELEPHONE ENCOUNTER
"Writer called patient to check in on the following concerns:   -pt declined palliative care referral  -from RT, \"I looked over her download (FYI she is on IVAPS on the Astral machine which runs just like AVAPS) and to me it seems that her settings are appropriate for her and that she just isn't wearing it consistently enough. I did call her RT at [facility] that manages her and this was his opinion also. It looks like she is wearing it most days but not all, but she is only wearing it an average of 5 hours a day when she does wear it. He is going to call her and try to encourage her to wear it more also, but you should also really encourage her to wear it for longer duration at night and also during the day with any naps or even for an hour or so when she has some down time during the day. Her RT's name at [facility] is Jcarlos and here is his direct number [phone], he has worked with her a lot and knows her pretty well.\"    Left VM asking for call back      "

## 2024-05-20 DIAGNOSIS — Z94.2 LUNG REPLACED BY TRANSPLANT (H): Primary | ICD-10-CM

## 2024-05-20 LAB
ACID FAST STAIN (ARUP): NORMAL
PROSPERA TRANSPLANT MONITORING: 0.62 %

## 2024-05-20 RX ORDER — LIDOCAINE 4 G/G
1 PATCH TOPICAL EVERY 24 HOURS
Qty: 30 PATCH | Refills: 3 | Status: SHIPPED | OUTPATIENT
Start: 2024-05-20

## 2024-05-20 NOTE — TELEPHONE ENCOUNTER
Received a message from Kaylin CAMARILLO to remove patient from kidney referral list as she is no longer a candidate to proceed with evaluation.      Ended pre kidney referral today.

## 2024-05-22 LAB
DONOR IDENTIFICATION: NORMAL
DQB2: 6948
DSA COMMENTS: NORMAL
DSA PRESENT: YES
DSA TEST METHOD: NORMAL
ORGAN: NORMAL
SA 1  COMMENTS: NORMAL
SA 1 CELL: NORMAL
SA 1 TEST METHOD: NORMAL
SA 2 CELL: NORMAL
SA 2 COMMENTS: NORMAL
SA 2 TEST METHOD: NORMAL
SA1 HI RISK ABY: NORMAL
SA1 MOD RISK ABY: NORMAL
SA2 HI RISK ABY: NORMAL
SA2 MOD RISK ABY: NORMAL
UNACCEPTABLE ANTIGENS: NORMAL
UNOS CPRA: 37

## 2024-05-22 NOTE — TELEPHONE ENCOUNTER
Writer called patient to check in.   Discussed previous message from RT. See previous note.   Pt will try to wear CPAP longer overnight, and for an hour or two during the day and with naps.     Pt declined palliative care referral. States she is not interested in their services at this time. Discussed what palliative care offers and how this is different from hospice. But still feels things might turn around at some point. Discussed at length with patient. She knows this is an option in the future when/if she is ready.

## 2024-05-23 ENCOUNTER — LAB (OUTPATIENT)
Dept: LAB | Facility: CLINIC | Age: 64
End: 2024-05-23
Payer: MEDICARE

## 2024-05-23 DIAGNOSIS — Z94.2 LUNG REPLACED BY TRANSPLANT (H): ICD-10-CM

## 2024-05-24 ENCOUNTER — TELEPHONE (OUTPATIENT)
Dept: TRANSPLANT | Facility: CLINIC | Age: 64
End: 2024-05-24
Payer: MEDICARE

## 2024-05-24 DIAGNOSIS — Z94.2 LUNG REPLACED BY TRANSPLANT (H): Primary | ICD-10-CM

## 2024-05-24 DIAGNOSIS — D84.9 IMMUNOSUPPRESSED STATUS (H): ICD-10-CM

## 2024-05-24 PROCEDURE — 80158 DRUG ASSAY CYCLOSPORINE: CPT | Performed by: PHYSICIAN ASSISTANT

## 2024-05-25 LAB
CYCLOSPORINE BLD LC/MS/MS-MCNC: 220 UG/L (ref 50–400)
TME LAST DOSE: NORMAL H
TME LAST DOSE: NORMAL H

## 2024-05-28 DIAGNOSIS — Z94.2 LUNG TRANSPLANT STATUS, BILATERAL (H): ICD-10-CM

## 2024-05-28 RX ORDER — CYCLOSPORINE 25 MG/1
75 CAPSULE, LIQUID FILLED ORAL 2 TIMES DAILY
Qty: 240 CAPSULE | Refills: 11 | Status: SHIPPED | OUTPATIENT
Start: 2024-05-28 | End: 2024-06-06

## 2024-05-28 NOTE — PROGRESS NOTES
Cyclosporine level 220.  Goal 120-140. Patient currently taking 100mg BID of Cyclosporine. Instructed patient to decrease dose to 75mg BID. Noquo message sent.

## 2024-05-28 NOTE — RESULT ENCOUNTER NOTE
Cyclosporine level 220. Goal 120-140.  Decreased Cyclosporine dose to 75mg BID. Mychart message sent. Plan to recheck in 1 week.

## 2024-06-05 ENCOUNTER — LAB (OUTPATIENT)
Dept: LAB | Facility: CLINIC | Age: 64
End: 2024-06-05
Payer: MEDICARE

## 2024-06-05 DIAGNOSIS — Z94.2 LUNG REPLACED BY TRANSPLANT (H): Primary | ICD-10-CM

## 2024-06-05 PROCEDURE — 80158 DRUG ASSAY CYCLOSPORINE: CPT | Performed by: PHYSICIAN ASSISTANT

## 2024-06-06 ENCOUNTER — TELEPHONE (OUTPATIENT)
Dept: TRANSPLANT | Facility: CLINIC | Age: 64
End: 2024-06-06
Payer: MEDICARE

## 2024-06-06 DIAGNOSIS — Z94.2 LUNG TRANSPLANT STATUS, BILATERAL (H): ICD-10-CM

## 2024-06-06 LAB
CYCLOSPORINE BLD LC/MS/MS-MCNC: 94 UG/L (ref 50–400)
TME LAST DOSE: NORMAL H
TME LAST DOSE: NORMAL H

## 2024-06-06 RX ORDER — CYCLOSPORINE 25 MG/1
CAPSULE, LIQUID FILLED ORAL
Qty: 210 CAPSULE | Refills: 11 | Status: SHIPPED | OUTPATIENT
Start: 2024-06-06 | End: 2024-06-17

## 2024-06-06 NOTE — TELEPHONE ENCOUNTER
Cyclosporine level 94 at 12 hours, on 6/5/2024.  Goal 120-140.   Current dose 75 mg in AM, 75 mg in PM    Dose changed to 75 mg in AM, 100 mg in PM   Recheck level in 3-5 days    Discussed with patient   MyChart message sent

## 2024-06-10 ENCOUNTER — TELEPHONE (OUTPATIENT)
Dept: TRANSPLANT | Facility: CLINIC | Age: 64
End: 2024-06-10
Payer: MEDICARE

## 2024-06-10 NOTE — TELEPHONE ENCOUNTER
[facility] called;  Need updated routine lab order faxed# [phone]  (patient has lab appts., in two days).

## 2024-06-11 ENCOUNTER — MYC MEDICAL ADVICE (OUTPATIENT)
Dept: GASTROENTEROLOGY | Facility: CLINIC | Age: 64
End: 2024-06-11
Payer: MEDICARE

## 2024-06-12 ENCOUNTER — LAB (OUTPATIENT)
Dept: LAB | Facility: CLINIC | Age: 64
End: 2024-06-12
Payer: MEDICARE

## 2024-06-12 DIAGNOSIS — Z94.2 LUNG REPLACED BY TRANSPLANT (H): Primary | ICD-10-CM

## 2024-06-12 PROCEDURE — 80158 DRUG ASSAY CYCLOSPORINE: CPT | Performed by: PHYSICIAN ASSISTANT

## 2024-06-13 ENCOUNTER — TELEPHONE (OUTPATIENT)
Dept: TRANSPLANT | Facility: CLINIC | Age: 64
End: 2024-06-13

## 2024-06-13 LAB
CYCLOSPORINE BLD LC/MS/MS-MCNC: 91 UG/L (ref 50–400)
TME LAST DOSE: NORMAL H
TME LAST DOSE: NORMAL H

## 2024-06-13 NOTE — TELEPHONE ENCOUNTER
Left a message for Sofie about her cyclosporine level.   Asking if it truly was a 18 hour level as appears in chart.     Level: 91 (goal of 120-140) at 18 hours.   Current dose 75mg in AM and 100mg in PM.     Will have her redo lab or adjust level accordingly once she calls back.

## 2024-06-14 ENCOUNTER — TELEPHONE (OUTPATIENT)
Dept: TRANSPLANT | Facility: CLINIC | Age: 64
End: 2024-06-14
Payer: MEDICARE

## 2024-06-17 ENCOUNTER — TELEPHONE (OUTPATIENT)
Dept: TRANSPLANT | Facility: CLINIC | Age: 64
End: 2024-06-17
Payer: MEDICARE

## 2024-06-17 DIAGNOSIS — Z94.2 LUNG TRANSPLANT STATUS, BILATERAL (H): ICD-10-CM

## 2024-06-17 RX ORDER — CYCLOSPORINE 25 MG/1
CAPSULE, LIQUID FILLED ORAL
Qty: 240 CAPSULE | Refills: 11 | Status: SHIPPED | OUTPATIENT
Start: 2024-06-17 | End: 2024-09-10

## 2024-06-17 NOTE — RESULT ENCOUNTER NOTE
Cyclosporine level 91 at 12 hours, on 6/12/2024.  Goal 120-140.   Current dose 100 mg in AM, 75 mg in PM    Dose changed to 100 mg in AM, 100 mg in PM   Recheck level in 5-7 days     Discussed with pt via phone call.

## 2024-06-19 ENCOUNTER — VIRTUAL VISIT (OUTPATIENT)
Dept: GASTROENTEROLOGY | Facility: CLINIC | Age: 64
End: 2024-06-19
Attending: INTERNAL MEDICINE
Payer: MEDICARE

## 2024-06-19 VITALS — BODY MASS INDEX: 17.48 KG/M2 | HEIGHT: 62 IN | WEIGHT: 95 LBS

## 2024-06-19 DIAGNOSIS — K31.84 GASTROPARESIS: ICD-10-CM

## 2024-06-19 DIAGNOSIS — K59.9 ABNORMAL SMALL BOWEL MOTILITY: Primary | ICD-10-CM

## 2024-06-19 DIAGNOSIS — K63.8219 SMALL INTESTINAL BACTERIAL OVERGROWTH (SIBO): ICD-10-CM

## 2024-06-19 PROCEDURE — 99214 OFFICE O/P EST MOD 30 MIN: CPT | Mod: 95 | Performed by: INTERNAL MEDICINE

## 2024-06-19 ASSESSMENT — PAIN SCALES - GENERAL: PAINLEVEL: NO PAIN (0)

## 2024-06-19 NOTE — PATIENT INSTRUCTIONS
You will find a brief summary of your discussion and care plan from today's visit below.  Dr Navarro has outlined the following steps after your recent clinic visit:    RECOMMENDATIONS:  - Can consider prucalopride to improve J-tube feeding tolerance in the future if willing    Please call with any questions or concerns regarding your clinic visit today.     It is a pleasure being involved in your health care.     Contacts post-consultation depending on your need:     Schedule Clinic Appointments                        [phone], option 1    Teresa Vogel RN Care Coordinator           [phone]     Jalen Jackson [place]                           [phone]     GI Procedure Scheduling                               [phone], option 2     For urgent/emergent questions after business hours, you may reach the on-call GI Fellow by contacting the [facility]  at [phone].    How to I schedule a follow-up visit?  If you did not schedule a follow-up visit today, please call [phone] option #1 to schedule a follow-up office visit.       How do I schedule labs, imaging studies, or procedures that were ordered in clinic today?      Labs: To schedule lab appointment at the [facility], use my chart or call [phone]. If you have a [place] lab closer to home where you are regularly seen you can give them a call.      Procedures: If a colonoscopy, upper endoscopy, breath test, esophageal manometry, or pH impedence was ordered today, our endoscopy team will call you to schedule this. If you have not heard from our endoscopy team within a week, please call [phone] to schedule.      Imaging Studies: If you were scheduled for a CT scan, X-ray, MRI, ultrasound, HIDA scan or other imaging study, please call [phone] to have this scheduled.      Referral: If a referral to another specialty was ordered, expect a phone call or follow instructions above. If you have not  heard from anyone regarding your referral in a week, please call our clinic to check the status.     I recommend signing up for [employer] access if you have not already done so and are comfortable with using a computer.  This allows for online access to your lab results and also helps you communicate efficiently with the clinic should any questions arise in your care.

## 2024-06-19 NOTE — PROGRESS NOTES
Virtual Visit Details    Type of service:  Video Visit   Video Start Time: 7:04 AM  Video End Time:7:31 AM    Originating Location (pt. Location): Home    Distant Location (provider location):  On-site  Platform used for Video Visit: 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 Diff, h/o EBV viremia, ESRD on dialysis, hemobilia s/p ERCP presumably due to hemorrhage into gallbladder, initially referred for gastroparesis s/p PEGJ placement but but also had chronic diarrhea with +SIBO testing. Now s/p several rounds of SIBO treatments and G-POEM. Hospitalized Spring 2024 for a prolonged period of time to start TPN due to continued weight loss/malnutrition but course complicated by multiple ICU admissions for respiratory failure which appeared to be related to TPN/fluid overload among other factors. Now off TPN and on cycled J tube feeds.    #Gastroparesis  No improvement following G-POEM. Repeat gastric emptying study showed worsening. Given difficulty tolerating even J-tube feeds from small bowel hypomotility, unlikely Reglan/erythromycin with have much overall benefit    #J-tube feeding intolerance  #Chronic Osmotic Diarrhea/SIBO- Resolved  #Weight loss/severe protein-calorie malnutrition  #H/o recurrent C.diff  2023 inpatient work up was consistent with an osmotic diarrhea (negative colonoscopy and infectious work up). This would suggest a malabsorptive process and with the associated gas/bloating symptoms and small bowel dysmotility suggested by J-tube feeding intolerance. SIBO testing was positive. Ultimately completed a few different antibiotic regimens including Rifaximin 550  mg TID x 2 weeks.     Following recent Spring 2024 hospitalization, diarrhea has resolved for the past 3 months. Able to get in 2-3 cartons/day of elemental J-tube feeds before developing nausea. Weight per the patient is stable for the past month. She should be getting 4 cartons/day. Limited options to aid with improving J-tube feeding tolerance. I did offer a trial of prucalopride as a promotility agent but this could cause diarrhea. She does not wish to try this at this time and would like to see how she does now that things have been stable for the past month.    RECOMMENDATIONS:  - Can consider prucalopride to improve J-tube feeding tolerance in the future if patient willing    Gonzalez Navarro MD  [facility]  Division of Gastroenterology and Hepatology  [facility] [address]  [address] [place] [address]    ________________________________________________________________  HPI:  Sofie is a 64 year old female with a PMHx of end stage COPD s/p bilateral lung transplant on 6/28/22 complicated by acute limb ischemia of LUE s/p left radial thrombectomy, h/o C. Diff, h/o EBV viremia, ESRD on dialysis, hemobilia s/p ERCP presumably due to hemorrhage into gallbladder, initially referred for gastroparesis s/p PEGJ placement but but also had chronic diarrhea with +SIBO testing. Now s/p several rounds of SIBO treatments and G-POEM. Hospitalized Spring 2024 to start TPN due to continued weight loss but course complicated by multiple ICU admissions for respiratory failure which appeared to be related to TPN/fluid overload among other factors.     PRIOR History:  She essentially developed gastroparesis following her lung transplant and underwent PEGJ placement by IR on 7/27/22. Gastric emptying study on 1/2/2023 showed delayed emptying of 75% at 240 min. Continued on J-tube feeds but does eat sometimes for comfort. Eating can make her symptomatic with nausea, bloating/gas. She will  periodically vent her G-tube about once a week, sometimes more frequently if she's been eating, when she has symptoms of bloating/gas and distension which helps. She has never been on Reglan.     She underwent EGD with pyloric Botox injection on 1/25/23. She noticed an improvement following the procedure although still some residual symptoms.     1/25/23 GCSI= 3,0,0,3,2,2,3,4,4=21   2/24/23 GCSI= 1,0,0,3,1,1,2,2,2=12  She underwent G-POEM procedure on 10/9/23. Procedure was uneventful. No real improvement following procedure and gastric emptying without improvement.   10/9/23 GCSI prior to G-POEM= 3,0,0,3,2,2,3,4,4=21   11/29/23 GCSI= 2,0,0,2,4,4,0,1,0=13    Completed a course of Rifaximin for SIBO within initial improvement in diarrhea and then worsening.     Interval History:  Her hospitalization from 2/10/24-4/15/24 was complicated. She was hospitalized due to progressive malnutrition/failure to thrive. TPN was not tolerated and may have exacerbated respiratory failure episode, therefore it was discontinued. She was transitioned back to elemental tube feeds. [facility] team was consulted while she was an inpatient. She had recurrent C. Diff in March and was treated with fidaxomicin. Diarrhea persisted and colonoscopy was recommended but patient declined. Diarrhea was managed with loperamide with improvement.     Today she reports doing okay. She is on cyclic TF and has been able to ramp up her J-tube feeds to 60 mL/hr for about the past month. Sometimes she gets nauseous with the feeds and has to stop. Typically she gets through 2-3 cartons/day. She tries to eat by mouth but can only eat a quarter of a plate twice a day. Diarrhea has significantly improved. For the last three months she can only recall one episode of diarrhea. Currently she reports normal formed bowel movements once daily. Weight has been mostly stable. 82 lbs yesterday and hovers from 79-83lbs for the past month.    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    Hepatitis 2017    Hep C, [facility]    History of blood transfusion     HTN (hypertension)     Infectious mononucleosis     Lung infection 11/30/2022    Osteopenia      Patient Active Problem List   Diagnosis    COPD (chronic obstructive pulmonary disease) (H)    Abnormal findings on diagnostic imaging of cardiovascular system    Status post coronary angiogram    Hepatitis C, chronic (H)    S/P lung transplant (H)    Acute post-operative pain    Immunosuppressed status (H24)    Acute kidney failure with tubular necrosis (H24)    Thrombus of left radial artery (H)    Diaphragm dysfunction    Paroxysmal A-fib (H)    Administration of long-term prophylactic antibiotics    EBV (Vibha-Barr virus) viremia    Acute pylorus ulcer    Hypogammaglobulinemia (H24)    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 difficile colitis    Leukopenia, unspecified type    Clinical diagnosis of COVID-19       PSurgHx:  Past Surgical History:   Procedure Laterality Date    BRONCHOSCOPY (RIGID OR FLEXIBLE), DIAGNOSTIC N/A 08/02/2022    Procedure: BRONCHOSCOPY, DIAGNOSTIC- inspection Bronch;  Surgeon: Kamala Lovell MD;  Location:  GI    BRONCHOSCOPY (RIGID OR FLEXIBLE), DIAGNOSTIC N/A 09/13/2022    Procedure: INSPECTION BRONCHOSCOPY, WITH BRONCHOALVEOLAR LAVAGE;  Surgeon: Jose R Mccullough  MD Mason;  Location:  GI    BRONCHOSCOPY (RIGID OR FLEXIBLE), DIAGNOSTIC N/A 11/09/2022    Procedure: BRONCHOSCOPY, WITH BRONCHOALVEOLAR LAVAGE AND BIOPSY;  Surgeon: Cesar Lima MD;  Location: [facility]    BRONCHOSCOPY (RIGID OR FLEXIBLE), DIAGNOSTIC N/A 01/25/2023    Procedure: BRONCHOSCOPY, WITH BRONCHOALVEOLAR LAVAGE AND BIOPSY;  Surgeon: Mason Reddy MD;  Location:  GI    BRONCHOSCOPY (RIGID OR FLEXIBLE), DIAGNOSTIC N/A 04/19/2023    Procedure: BRONCHOSCOPY, WITH BRONCHOALVEOLAR LAVAGE AND BIOPSY;  Surgeon: Kamala Lovell MD;  [facility]    BRONCHOSCOPY (RIGID OR FLEXIBLE), DIAGNOSTIC N/A 07/12/2023    Procedure: BRONCHOSCOPY, WITH BRONCHOALVEOLAR LAVAGE AND BIOPSY;  Surgeon: Cesar Lima MD;  Location:  GI    BRONCHOSCOPY FLEXIBLE AND RIGID N/A 07/19/2022    Procedure: BRONCHOSCOPY inspection only;  Surgeon: Bob Liao MD;  Location:  GI    COLONOSCOPY  2015    CORONARY ANGIOGRAPHY ADULT ORDER      CV CORONARY ANGIOGRAM N/A 06/30/2021    Procedure: CV CORONARY ANGIOGRAM;  Surgeon: Alexander Cuellar MD;  Location:  HEART CARDIAC CATH LAB    CV RIGHT HEART CATH MEASUREMENTS RECORDED N/A 06/30/2021    Procedure: CV RIGHT HEART CATH;  Surgeon: Alexander Cuellar MD;  Location:  HEART CARDIAC CATH LAB    ENDOSCOPIC PERORAL MYOTOMY N/A 10/09/2023    Procedure: MYOTOMY, ESOPHAGUS, ENDOSCOPIC, ORAL APPROACH;  Surgeon: Gonzalez Navarro MD;  [facility]    ENDOSCOPIC RETROGRADE CHOLANGIOPANCREATOGRAM N/A 08/11/2022    Procedure: ENDOSCOPIC RETROGRADE CHOLANGIOPANCREATOGRAPHY WITH PANCREATIC DUCT NEEDLE KNIFE AND STENT PLACEMENT, BILE DUCT SPHINCTEROTOMY, BLOOD/DEBRIS REMOVAL AND STENT PLACEMENT;  Surgeon: Cosmo Arroyo MD;  [facility]    ENDOSCOPIC RETROGRADE CHOLANGIOPANCREATOGRAM N/A 10/07/2022    Procedure: ENDOSCOPIC RETROGRADE CHOLANGIOPANCREATOGRAPHY with biliary and pancreatic stent removal, debris removal;  Surgeon: Cosmo Arroyo MD;  Location:  [facility]    ENT SURGERY  1974    tonsillectomy    ENTEROSCOPY SMALL BOWEL N/A 08/11/2022    Procedure: SMALL BOWEL ENTEROSCOPY;  Surgeon: Cosmo Arroyo MD;  [facility]    ESOPHAGOGASTRODUODENOSCOPY, WITH NASOGASTRIC TUBE INSERTION N/A 07/01/2022    Procedure: ESOPHAGOGASTRODUODENOSCOPY, WITH NASOJEJUNAL TUBE INSERTION;  Surgeon: Ozzy Nickerson MD;  [facility]: [facility]    ESOPHAGOSCOPY, GASTROSCOPY, DUODENOSCOPY (EGD), COMBINED N/A 08/03/2022    Procedure: ESOPHAGOGASTRODUODENOSCOPY (EGD);  Surgeon: Ira Andres MD;  [facility]    ESOPHAGOSCOPY, GASTROSCOPY, DUODENOSCOPY (EGD), COMBINED N/A 01/25/2023    Procedure: ESOPHAGOGASTRODUODENOSCOPY (EGD) with botox injection;  Surgeon: Gonzalez Navarro MD;  [facility]    ESOPHAGOSCOPY, GASTROSCOPY, DUODENOSCOPY (EGD), COMBINED N/A 10/09/2023    Procedure: ESOPHAGOGASTRODUODENOSCOPY;  Surgeon: Gonzalez Navarro MD;  [facility]    HAND SURGERY      INSERT CHEST TUBE Right 09/13/2022    Procedure: Insert chest tube;  Surgeon: Jose R Mccullough MD;  Location: [facility]    IR CVC TUNNEL PLACEMENT > 5 YRS OF AGE  09/26/2022    IR CVC TUNNEL PLACEMENT > 5 YRS OF AGE  02/14/2024    IR CVC TUNNEL REMOVAL RIGHT  3/6/2024    IR GASTRO JEJUNOSTOMY TUBE CHANGE  08/31/2022    IR GASTRO JEJUNOSTOMY TUBE CHANGE  12/21/2022    IR GASTRO JEJUNOSTOMY TUBE CHANGE  07/12/2023    IR GASTRO JEJUNOSTOMY TUBE CHANGE  08/18/2023    IR GASTRO JEJUNOSTOMY TUBE CHANGE  11/14/2023    IR GASTRO JEJUNOSTOMY TUBE CHANGE  4/8/2024    IR GASTRO JEJUNOSTOMY TUBE PLACEMENT  07/27/2022    IR THORACENTESIS  08/29/2022    LEEP TX, CERVICAL  04/07/2017    HECTOR III    LYMPH NODE BIOPSY Left 2005    Left axilla, benign- [facility]    MIDLINE INSERTION - DOUBLE LUMEN Left 07/28/2022    20cm, Basilic vein    PICC DOUBLE LUMEN PLACEMENT Right 03/04/2024    5FR DL PICc, basiliv vein- L-36cm, 1cm out.    REPLACE GASTROJEJUNOSTOMY TUBE, PERCUTANEOUS  10/07/2022    Procedure: Replace  Gastrojejunostomy Tube;  Surgeon: Cosmo Arroyo MD;  [facility]    THORACENTESIS Left 08/29/2022    Procedure: THORACENTESIS;  Surgeon: Bo Capone PA-C;  Location: [facility] OR    THORACENTESIS Left 09/13/2022    Procedure: Thoracentesis;  Surgeon: Jose R Mccullough MD;  [facility]    THROMBECTOMY UPPER EXTREMITY Left 07/02/2022    Procedure: LEFT RADIAL ARM THROMBECTOMY;  Surgeon: Christie Graham MD;  [facility]    TRANSPLANT LUNG RECIPIENT SINGLE X2 Bilateral 06/28/2022    Procedure: Clamshell Incision, Bilateral Sequential Lung Transplant, On Cardiopulmonary Bypass, Flexible Bronchoscopy;  Surgeon: Sue Sunshine MD;  Location: [facility]       MEDS:  Current Outpatient Medications   Medication Sig Dispense Refill    acetaminophen (TYLENOL) 500 MG tablet 500-1,000 mg by Per J Tube route 3 times daily as needed for mild pain      amiodarone (PACERONE) 200 MG tablet 1 tablet (200 mg) by Oral or Feeding Tube route daily 30 tablet 4    apixaban ANTICOAGULANT (ELIQUIS) 2.5 MG tablet Take 1 tablet (2.5 mg) by mouth 2 times daily 60 tablet 4    B Complex-C-Folic Acid (DIALYVITE) TABS 1 tablet by Per J Tube route every morning 30 tablet 11    Biotin 2500 MCG CHEW Take 2,500 mcg by mouth daily      calcium carbonate-vitamin D (CALTRATE) 600-10 MG-MCG per tablet 1 tablet by Per J Tube route 3 times daily (with meals) 90 tablet 0    Calcium Carbonate-Vitamin D 600-10 MG-MCG TABS 1 tablet by Per J Tube route 3 times daily 90 tablet 11    cloNIDine (CATAPRES) 0.1 MG tablet Take 1 tablet (0.1 mg) by mouth at bedtime 30 tablet 0    cycloSPORINE modified (GENERIC EQUIVALENT) 25 MG capsule Take 4 capsules (100 mg) by mouth every morning AND 4 capsules (100 mg) every evening. 240 capsule 11    fluticasone (FLONASE) 50 MCG/ACT nasal spray Spray 1 spray into both nostrils daily 9.9 mL 1    levothyroxine (SYNTHROID/LEVOTHROID) 25 MCG tablet Take 1 tablet (25 mcg) by mouth daily 30 tablet 4     Lidocaine (LIDOCARE) 4 % Patch Place 1 patch onto the skin every 24 hours To prevent lidocaine toxicity, patient should be patch free for 12 hrs daily. 30 patch 3    liothyronine (CYTOMEL) 5 MCG tablet Take 0.5 tablets (2.5 mcg) by mouth 2 times daily 15 tablet 0    loperamide (IMODIUM A-D) 2 MG tablet 1 tablet (2 mg) by Per J Tube route 4 times daily as needed for diarrhea      metoprolol tartrate (LOPRESSOR) 50 MG tablet 0.5 tablets (25 mg) by Per Feeding Tube route 2 times daily 60 tablet 11    midodrine (PROAMATINE) 10 MG tablet Take 1 tablet (10 mg) by mouth 2 times daily as needed (for map less than 65 or sbp less than 100 on HD days) 30 tablet 0    multivitamin (CENTRUM SILVER) tablet Take 1 tablet by mouth daily      OLANZapine zydis (ZYPREXA) 5 MG ODT Take 1 tablet (5 mg) by mouth at bedtime 30 tablet 4    predniSONE (DELTASONE) 5 MG tablet 1 tablet (5 mg) by Per J Tube route every morning 30 tablet 11    protein modular (PROSOURCE TF) LIQD 1 packet by Per Feeding Tube route daily 90 packet 3    senna-docusate (SENOKOT-S/PERICOLACE) 8.6-50 MG tablet Take 1 tablet by mouth 2 times daily as needed for constipation (Patient not taking: Reported on 5/7/2024) 300 tablet 0    sevelamer carbonate, RENVELA, 0.8 GM PACK Packet Take 1 packet (0.8 g) by mouth 3 times daily (with meals) (Patient taking differently: Take 0.8 g by mouth 5 times daily)      sulfamethoxazole-trimethoprim (BACTRIM) 400-80 MG tablet Take 1 tablet by mouth three times a week 30 tablet 2     No current facility-administered medications for this visit.     ALLERGIES:    Allergies   Allergen Reactions    Blood Transfusion Related (Informational Only) Other (See Comments)     Patient has a history of a clinically significant antibody against RBC antigens.  A delay in compatible RBCs may occur.     No Clinical Screening - See Comments Other (See Comments)     Patient has a history of a clinically significant antibody against RBC antigens.  A delay  in compatible RBCs may occur.    Azithromycin Rash     12/1/22: Developed a rash that is not raised and looks diffuse in nature. It started in the groin and up the back and has now worked its way up her chest into her face. Pt states that it has now started to itch. She is breathing and talking normally and denies any airway changes. Unclear what started rash. Pt noted feeling somewhat itchy yesterday.    Ganciclovir Rash     12/1/22: Developed a rash that is not raised and looks diffuse in nature. It started in the groin and up the back and has now worked its way up her chest into her face. Pt states that it has now started to itch. She is breathing and talking normally and denies any airway changes. Unclear what started rash. Pt noted feeling somewhat itchy yesterday.     FHx: Noncontributory    SOCIAL Hx:  Social History     Socioeconomic History    Marital status:      Spouse name: Not on file    Number of children: Not on file    Years of education: Not on file    Highest education level: Not on file   Occupational History    Not on file   Tobacco Use    Smoking status: Former     Current packs/day: 0.00     Average packs/day: 0.5 packs/day for 30.0 years (15.0 ttl pk-yrs)     Types: Cigarettes     Start date: 11/11/1990     Quit date: 11/11/2020     Years since quitting: 3.6    Smokeless tobacco: Never   Substance and Sexual Activity    Alcohol use: Not Currently     Comment: Stopped drinking in 2020    Drug use: Not Currently     Types: Marijuana, Methamphetamines     Comment: hx:marijuana and methamphetamine-quit both unsure ?  2-3 yrs ago    Sexual activity: Not on file   Other Topics Concern    Parent/sibling w/ CABG, MI or angioplasty before 65F 55M? Not Asked   Social History Narrative    Not on file     Social Determinants of Health     Financial Resource Strain: Low Risk  (5/7/2024)    Received from [facility]    Overall Financial Resource Strain (CARDIA)     Difficulty of  Paying Living Expenses: Not very hard   Food Insecurity: No Food Insecurity (5/7/2024)    [facility]    Hunger Vital Sign     Worried About Running Out of Food in the Last Year: Never true     Ran Out of Food in the Last Year: Never true   Transportation Needs: No Transportation Needs (5/7/2024)    Received from [facility]    Transportation Needs     In the past 12 months, has lack of transportation kept you from medical appointments, meetings, work, or from getting medicines or things needed for daily living?: No   Physical Activity: Sufficiently Active (5/7/2024)    Received from [facility]    Exercise Vital Sign     Days of Exercise per Week: 3 days     Minutes of Exercise per Session: 60 min   Recent Concern: Physical Activity - Inactive (4/17/2024)    Received from [facility]    Exercise Vital Sign     Days of Exercise per Week: 0 days     Minutes of Exercise per Session: 20 min   Stress: No Stress Concern Present (5/7/2024)    Received from [facility]    [employer] - Occupational Stress Questionnaire     Feeling of Stress : Not at all   Social Connections: Moderately Isolated (5/7/2024)    Received from [facility]    Social Connection and Isolation Panel [NHANES]     Frequency of Communication with Friends and Family: More than three times a week     Frequency of Social Gatherings with Friends and Family: Twice a week     Attends Jain Services: 1 to 4 times per year     Active Member of Clubs or Organizations: No     Attends Club or Organization Meetings: Never     Marital Status:    Interpersonal Safety: Not At Risk (5/9/2024)    Received from [facility]    Intimate Partner Violence     Are you in a relationship where you are physically hurt, threatened and/or made to feel afraid?: No   Housing  "Stability: Unknown (5/7/2024)    [facility]    Housing Stability Vital Sign     Unable to Pay for Housing in the Last Year: No     Number of Times Moved in the Last Year: Not on file     Homeless in the Last Year: Not on file       ROS: A comprehensive Review of Systems was asked and answered in the negative unless specifically commented upon in the HPI    Physical Exam  Ht 1.575 m (5' 2\")   Wt 43.1 kg (95 lb)   BMI 17.38 kg/m    Body mass index is 17.38 kg/m .  Gen: A&Ox3, NAD  HEENT: Moist mucus membranes, no scleral icterus.  Lungs: no respiratory distress      LABS:  External Order Results on 12/30/2022   Component Date Value Ref Range Status    Sodium (External) 12/30/2022 137  136 - 146 mmol/L Final    Potassium (External) 12/30/2022 4.1  3.5 - 5.1 mmol/L Final    Chloride (External) (External) 12/30/2022 97 (L)  98 - 107 mmol/L Final    CO2 (External) 12/30/2022 27  22 - 29 mmol/L Final    Anion Gap (External) 12/30/2022 17.1  10.0 - 20.0 mmol/L Final    Creatinine (External) 12/30/2022 4.34  mg/dL Final    Urea Nitrogen (External) 12/30/2022 43.6 (H)  10.0 - 20.0 mg/dL Final    BUN/Creatinine Ratio (External) 12/30/2022 10.05 (L)  11.70 - 22.90 ratio Final    Calcium (External) 12/30/2022 9.5  8.6 - 10.5 mg/dL Final    Glucose (External) 12/30/2022 102 (H)  70 - 100 mg/dL Final    GFR Estimated (External) 12/30/2022 11 (L)  >=60 ml/min/1.73m2 Final    Magnesium (External) 12/30/2022 2.5  1.8 - 2.6 mg/dL Final    WBC Count (External) 12/30/2022 4.7  10(3)/uL Final    RBC Count (External) 12/30/2022 2.81  10(6)/uL Final    Hemoglobin (External) 12/30/2022 10.3  g/dL Final    Hematocrit (External) 12/30/2022 31.1  % Final    MCV (External) 12/30/2022 110.7  fL Final    MCH (External) 12/30/2022 36.5  pg Final    MCHC (External) 12/30/2022 33.0  32.0 - 36.0 g/dL Final    RDW (External) 12/30/2022 16.2  % Final    Platelet Count (External) 12/30/2022 251  179 - 450 10(3)/uL " Final         IMAGING:  EXAM:  NM GASTRIC EMPTYING, 1/15/2024 1:31 PM     HISTORY: 3 month post gastric poem, f/up gastroparesis; Gastroparesis     TECHNIQUE: The patient ingested 2 mCi of Tc-99m sulfur colloid in 2  eggs, 2 pieces toast with jam and water.. Static images were acquired  at approximately 1 hour intervals out through 4 hours using a dual  head gamma camera in an anterior and posterior position. The  calculation of emptying was based on dual head imaging with geometric  mean calculations.  A Linear square fit was calculated to the emptying  curve to determine the amount of residual activity at each time point.     COMPARISON: 1/2/2023     FINDINGS:   Percent retention at 60 min = 93%.  Percent retention at 120 min = 93%.  Percent retention at 180 min = 89%.  Percent retention at 240 min = 86%.     T 1/2 emptying time =N/A                                                                      IMPRESSION: Delayed gastric emptying (see normal ranges below). It is  worse than 1/2/2023. Now the percent retention at 4 hours is 86%,  before on 1/2/2023 75%.     EXAM:  NM GASTRIC EMPTYING, 1/2/2023 1:21 PM  HISTORY: Gastroparesis; Lung replaced by transplant (H)     TECHNIQUE: The patient ingested 2.1 mCi of Tc-99m sulfur colloid in 2  eggs, 2 pieces of toast w/ jelly. Static images were acquired at  approximately 1 hour intervals out through 4 hours using a dual head  gamma camera in an anterior and posterior position. The calculation of  emptying was based on dual head imaging with geometric mean  calculations.  A Linear square fit was calculated to the emptying  curve to determine the amount of residual activity at each time point.     FINDINGS:   Percent retention at 60 min = 87%.  Percent retention at 120 min = 77%.  Percent retention at 180 min = 77%.  Percent retention at 240 min = 75%.     T 1/2 emptying time =  238 mins.                                                                      IMPRESSION:  Delayed gastric emptying    EXAM:  NM GASTRIC EMPTYING, 9/30/2022 12:32 PM     HISTORY: severe delayed gastric emptying for follow up; is off  dialysis. NPO weds night     TECHNIQUE: The patient ingested 2.3 mCi of Tc-99m sulfur colloid in in  2 eggs, 1 slices of toast with jelly, and a glass of water. Static  images were acquired at approximately 1 hour intervals out through 4  hours using a dual head gamma camera in an anterior and posterior  position. The calculation of emptying was based on dual head imaging  with geometric mean calculations.  A Linear square fit was calculated  to the emptying curve to determine the amount of residual activity at  each time point.     FINDINGS:   Percent retention at 60 min = 97%.  Percent retention at 120 min = 95%.  Percent retention at 180 min = 91%.  Percent retention at 240 min = 91%.     T 1/2 emptying time =  Too long to calculate.                                                                      IMPRESSION: Severe delayed gastric emptying    Endoscopies:  Upper GI Endoscopy 10/09/2023 11:50 AM [address] [address] [phone]     Endoscopy Department  _______________________________________________________________________________  Patient Name: Sofie Rodriguez            Procedure Date: 10/9/2023 11:50 AM  MRN: [id number]                       Account Number: [id number]  YOB: 1960               Admit Type: Outpatient  Age: 63                               Room:  OR   Gender: Female                        Note Status: Supervisor Override  Attending MD: OSKAR PÉREZ MD,      Pause for the Cause: time out performed  Total Sedation Time:                    _______________________________________________________________________________     Procedure:             Upper GI endoscopy  Indications:           For therapy of gastroparesis; 63 year old female with                         a  PMHx of 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 Diff, h/o EBV                         viremia, ESRD on dialysis, hemobilia s/p ERCP                         presumably due to hemorrhage into gallbladder,                         gastroparesis s/p PEGJ and diarrhea related to SIBO.                         Responded previously to pyloric botox. Patient here                         for G-POEM. GCSI= 3,0,0,3,2,2,3,4,4=21  Providers:             OSKAR PÉREZ MD  Referring MD:            Requesting Provider:   MICHELE SOSA MD  Medicines:             General Anesthesia, IV Zosyn 3.375g  Complications:         No immediate complications. Estimated blood loss:                         Minimal.  _______________________________________________________________________________  Procedure:             Pre-Anesthesia Assessment:                         - Prior to the procedure, a History and Physical was                         performed, and patient medications and allergies were                         reviewed. The patient is competent. The risks and                         benefits of the procedure and the sedation options and                         risks were discussed with the patient. All questions                         were answered and informed consent was obtained.                         Patient identification and proposed procedure were                         verified by the physician, the nurse, the                         anesthesiologist and the anesthetist in the procedure                         room. Mental Status Examination: alert and oriented.                         Airway Examination: normal oropharyngeal airway and                         neck mobility. Respiratory Examination: clear to                         auscultation. CV Examination: normal.  Prophylactic                         Antibiotics: The patient requires prophylactic                         antibiotics peroral endoscopic myotomy. Prior                         Anticoagulants: The patient has taken no anticoagulant                         or antiplatelet agents. ASA Grade Assessment: III - A                         patient with severe systemic disease. After reviewing                         the risks and benefits, the patient was deemed in                         satisfactory condition to undergo the procedure. The                         anesthesia plan was to use general anesthesia.                         Immediately prior to administration of medications,                         the patient was re-assessed for adequacy to receive                         sedatives. The heart rate, respiratory rate, oxygen                         saturations, blood pressure, adequacy of pulmonary                         ventilation, and response to care were monitored                         throughout the procedure. The physical status of the                         patient was re-assessed after the procedure.                         After obtaining informed consent, the endoscope was                         passed under direct vision. Throughout the procedure,                         the patient's blood pressure, pulse, and oxygen                         saturations were monitored continuously. The Endoscope                         was introduced through the mouth, and advanced to the                         second part of duodenum. The was introduced through                         the mouth, and advanced to the. After obtaining                         informed consent, the endoscope was passed under                         direct vision. Throughout the procedure, the patient's                         blood pressure, pulse, and oxygen saturations were                         monitored continuously.The  endoscopic myotomy was                         accomplished without difficulty. The patient tolerated                         the procedure well.                                                                                  Findings:       The esophagus was normal.       There was evidence of an intact gastrostomy with a patent GJ-tube       present in the gastric body extending into the duodneum. This was       characterized by healthy appearing mucosa. Small amount of retained food       and fluid in the stomach.       The examined duodenum was normal.       Localized moderate mucosal changes characterized by nodularity were       found at the pylorus at the 11 o'clock position. Likely hyperplastic       from GJ tube.       Preparations were made for gastric peroral endoscopic myotomy (G-POEM).       The stomach was cleaned and irrigated using saline and suctioned.       Mucosotomy followed by myotomy were performed in a greater curvature       orientation. First, a submucosal injection of a solution of methylene       blue and saline was used to lift the mucosa at the site of the initial       mucosotomy. The initial mucosal incision was made transversely at       approximately 4.0 cm proximal to the pylorus using a HybridKnife I-Type.       Next, the endoscope with a clear cap was used to enter into the       submucosal tunnel. The submucosal tunnel was then further created by       dissection of the submucosal fibers distally to the pyloric ring. A full       thickness myotomy (in a greater curvature orientation) was then       performed beginning at the pylorus using an ITknife2. The myotomy was       extended to 3.0 cm proximal to the pylorus. Intra procedure bleeding was       mild. A small Coagrasper was used effectively for hemostasis. After       completion of the myotomy, examination of the stomach and duodenum       showed no inadvertent mucosotomy. There was no evidence of bleeding       noted on  the inspection of the myotomy edges and submucosal tunnel. The       myotomy was successfully performed. The mucosal entrance to the       submucosal tunnel was closed using endoscopic suturing. After G-POEM and       endoscope removal, the patient was examined. The patient's abdomen was       nondistended. Pylorus lumen open without resistance to passage.                                                                                   Impression:            - Normal esophagus.                         - GJ tube in place and not manipulated                         - Normal examined duodenum.                         - Nodularity was found at the pylorus at the 11                         o'clock position. Likely hyperplastic from GJ tube.                         - Gastric peroral endoscopic myotomy (G-POEM) was                         performed along greater curvature as described above.                         Mucosotomy closed with endoscopic suturing.                         - No specimens collected.  Recommendation:        - Monitor in PACU and admit to medicine for                         observation.                         - NPO overnight. Leave G-tube to gravity and can start                         J-tube feeds tonight as tolerated                         - Started pantoprazole 40 mg IV BID.                         - Upon discharge, prescribe pantoprazole 40 mg PO BID                         x1-month.                         - Continue Zosyn IV while inpatient.                         - Patient had a positive breath test for SIBO but                         insurance denied Rifaximin 550 mg TID x 2 weeks.                         Consider attempting re-prescribing on discharge as did                         not respond to course of cipro. Does not need                         additional antibiotics related to procedure.                         - No anticoagulation or antiplatelets for 3-days.                          - Ordered head of bed elevation to 30-45 degrees.                         - Ordered incentive spirometry.                         - Order analgesia IV & antiemetics IV PRN.                         - Ordered CBC & BMP tomorrow AM                         - Ordered Upper GI series tomorrow AM to rule-out                         post-POEM gastric leak. Pneumoperitoneum expected.                         - If no gastric leak, and otherwise doing clinically                         well discharge home tomorrow and start full liquid diet                         - Liquid diet for three days starting tomorrow. Soft                         diet on day 4, 5,6. Regular diet starting on POD#7 as                         tolerated                         - Virtual follow-up with Dr. Navarro in 1-month.                                                                                     Gonzalez Navarro MD

## 2024-06-19 NOTE — LETTER
6/19/2024      Sofie Rodriguez  [address] [place] [address]      Dear Colleague,    Thank you for referring your patient, Sofie Rodriguez, to the [facility]. Please see a copy of my visit note below.      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 Diff, h/o EBV viremia, ESRD on dialysis, hemobilia s/p ERCP presumably due to hemorrhage into gallbladder, initially referred for gastroparesis s/p PEGJ placement but but also had chronic diarrhea with +SIBO testing. Now s/p several rounds of SIBO treatments and G-POEM. Hospitalized Spring 2024 for a prolonged period of time to start TPN due to continued weight loss/malnutrition but course complicated by multiple ICU admissions for respiratory failure which appeared to be related to TPN/fluid overload among other factors. Now off TPN and on cycled J tube feeds.    #Gastroparesis  No improvement following G-POEM. Repeat gastric emptying study showed worsening. Given difficulty tolerating even J-tube feeds from small bowel hypomotility, unlikely Reglan/erythromycin with have much overall benefit    #J-tube feeding intolerance  #Chronic Osmotic Diarrhea/SIBO- Resolved  #Weight loss/severe protein-calorie malnutrition  #H/o recurrent C.diff  2023 inpatient work up was consistent with an osmotic diarrhea (negative colonoscopy and infectious work up). This would suggest a malabsorptive process and with the associated gas/bloating symptoms and small bowel dysmotility suggested by J-tube feeding intolerance. SIBO testing was positive. Ultimately completed a few different antibiotic regimens including Rifaximin 550  mg TID x 2 weeks.     Following recent Spring 2024 hospitalization, diarrhea has resolved for the past 3 months. Able to get in 2-3 cartons/day of elemental J-tube feeds before developing nausea. Weight per the patient is stable for the past month. She should be getting 4 cartons/day. Limited options to aid with improving J-tube feeding tolerance. I did offer a trial of prucalopride as a promotility agent but this could cause diarrhea. She does not wish to try this at this time and would like to see how she does now that things have been stable for the past month.    RECOMMENDATIONS:  - Can consider prucalopride to improve J-tube feeding tolerance in the future if patient willing    Gonzalez Navarro MD  [facility]  Division of Gastroenterology and Hepatology  [facility] [address]  [address] [place] [address]    ________________________________________________________________  HPI:  Sofie is a 64 year old female with a PMHx of end stage COPD s/p bilateral lung transplant on 6/28/22 complicated by acute limb ischemia of LUE s/p left radial thrombectomy, h/o C. Diff, h/o EBV viremia, ESRD on dialysis, hemobilia s/p ERCP presumably due to hemorrhage into gallbladder, initially referred for gastroparesis s/p PEGJ placement but but also had chronic diarrhea with +SIBO testing. Now s/p several rounds of SIBO treatments and G-POEM. Hospitalized Spring 2024 to start TPN due to continued weight loss but course complicated by multiple ICU admissions for respiratory failure which appeared to be related to TPN/fluid overload among other factors.     PRIOR History:  She essentially developed gastroparesis following her lung transplant and underwent PEGJ placement by IR on 7/27/22. Gastric emptying study on 1/2/2023 showed delayed emptying of 75% at 240 min. Continued on J-tube feeds but does eat sometimes for comfort. Eating can make her symptomatic with nausea, bloating/gas. She will  periodically vent her G-tube about once a week, sometimes more frequently if she's been eating, when she has symptoms of bloating/gas and distension which helps. She has never been on Reglan.     She underwent EGD with pyloric Botox injection on 1/25/23. She noticed an improvement following the procedure although still some residual symptoms.     1/25/23 GCSI= 3,0,0,3,2,2,3,4,4=21   2/24/23 GCSI= 1,0,0,3,1,1,2,2,2=12  She underwent G-POEM procedure on 10/9/23. Procedure was uneventful. No real improvement following procedure and gastric emptying without improvement.   10/9/23 GCSI prior to G-POEM= 3,0,0,3,2,2,3,4,4=21   11/29/23 GCSI= 2,0,0,2,4,4,0,1,0=13    Completed a course of Rifaximin for SIBO within initial improvement in diarrhea and then worsening.     Interval History:  Her hospitalization from 2/10/24-4/15/24 was complicated. She was hospitalized due to progressive malnutrition/failure to thrive. TPN was not tolerated and may have exacerbated respiratory failure episode, therefore it was discontinued. She was transitioned back to elemental tube feeds. [facility] team was consulted while she was an inpatient. She had recurrent C. Diff in March and was treated with fidaxomicin. Diarrhea persisted and colonoscopy was recommended but patient declined. Diarrhea was managed with loperamide with improvement.     Today she reports doing okay. She is on cyclic TF and has been able to ramp up her J-tube feeds to 60 mL/hr for about the past month. Sometimes she gets nauseous with the feeds and has to stop. Typically she gets through 2-3 cartons/day. She tries to eat by mouth but can only eat a quarter of a plate twice a day. Diarrhea has significantly improved. For the last three months she can only recall one episode of diarrhea. Currently she reports normal formed bowel movements once daily. Weight has been mostly stable. 82 lbs yesterday and hovers from 79-83lbs for the past month.    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    Hepatitis 2017    Hep C, [facility]    History of blood transfusion     HTN (hypertension)     Infectious mononucleosis     Lung infection 11/30/2022    Osteopenia      Patient Active Problem List   Diagnosis    COPD (chronic obstructive pulmonary disease) (H)    Abnormal findings on diagnostic imaging of cardiovascular system    Status post coronary angiogram    Hepatitis C, chronic (H)    S/P lung transplant (H)    Acute post-operative pain    Immunosuppressed status (H24)    Acute kidney failure with tubular necrosis (H24)    Thrombus of left radial artery (H)    Diaphragm dysfunction    Paroxysmal A-fib (H)    Administration of long-term prophylactic antibiotics    EBV (Vibha-Barr virus) viremia    Acute pylorus ulcer    Hypogammaglobulinemia (H24)    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 difficile colitis    Leukopenia, unspecified type    Clinical diagnosis of COVID-19       PSurgHx:  Past Surgical History:   Procedure Laterality Date    BRONCHOSCOPY (RIGID OR FLEXIBLE), DIAGNOSTIC N/A 08/02/2022    Procedure: BRONCHOSCOPY, DIAGNOSTIC- inspection Bronch;  Surgeon: Kamala Lovell MD;  Location:  GI    BRONCHOSCOPY (RIGID OR FLEXIBLE), DIAGNOSTIC N/A 09/13/2022    Procedure: INSPECTION BRONCHOSCOPY, WITH BRONCHOALVEOLAR LAVAGE;  Surgeon: Jose R Mccullough  MD Mason;  Location:  GI    BRONCHOSCOPY (RIGID OR FLEXIBLE), DIAGNOSTIC N/A 11/09/2022    Procedure: BRONCHOSCOPY, WITH BRONCHOALVEOLAR LAVAGE AND BIOPSY;  Surgeon: Cesar Lima MD;  Location: [facility]    BRONCHOSCOPY (RIGID OR FLEXIBLE), DIAGNOSTIC N/A 01/25/2023    Procedure: BRONCHOSCOPY, WITH BRONCHOALVEOLAR LAVAGE AND BIOPSY;  Surgeon: Mason Reddy MD;  Location:  GI    BRONCHOSCOPY (RIGID OR FLEXIBLE), DIAGNOSTIC N/A 04/19/2023    Procedure: BRONCHOSCOPY, WITH BRONCHOALVEOLAR LAVAGE AND BIOPSY;  Surgeon: Kamala Lovell MD;  [facility]    BRONCHOSCOPY (RIGID OR FLEXIBLE), DIAGNOSTIC N/A 07/12/2023    Procedure: BRONCHOSCOPY, WITH BRONCHOALVEOLAR LAVAGE AND BIOPSY;  Surgeon: Cesar Lima MD;  Location:  GI    BRONCHOSCOPY FLEXIBLE AND RIGID N/A 07/19/2022    Procedure: BRONCHOSCOPY inspection only;  Surgeon: Bob Liao MD;  Location:  GI    COLONOSCOPY  2015    CORONARY ANGIOGRAPHY ADULT ORDER      CV CORONARY ANGIOGRAM N/A 06/30/2021    Procedure: CV CORONARY ANGIOGRAM;  Surgeon: Alexander Cuellar MD;  Location:  HEART CARDIAC CATH LAB    CV RIGHT HEART CATH MEASUREMENTS RECORDED N/A 06/30/2021    Procedure: CV RIGHT HEART CATH;  Surgeon: Alexander Cuellar MD;  Location:  HEART CARDIAC CATH LAB    ENDOSCOPIC PERORAL MYOTOMY N/A 10/09/2023    Procedure: MYOTOMY, ESOPHAGUS, ENDOSCOPIC, ORAL APPROACH;  Surgeon: Gonzalez Navarro MD;  [facility]    ENDOSCOPIC RETROGRADE CHOLANGIOPANCREATOGRAM N/A 08/11/2022    Procedure: ENDOSCOPIC RETROGRADE CHOLANGIOPANCREATOGRAPHY WITH PANCREATIC DUCT NEEDLE KNIFE AND STENT PLACEMENT, BILE DUCT SPHINCTEROTOMY, BLOOD/DEBRIS REMOVAL AND STENT PLACEMENT;  Surgeon: Cosmo Arroyo MD;  [facility]    ENDOSCOPIC RETROGRADE CHOLANGIOPANCREATOGRAM N/A 10/07/2022    Procedure: ENDOSCOPIC RETROGRADE CHOLANGIOPANCREATOGRAPHY with biliary and pancreatic stent removal, debris removal;  Surgeon: Cosmo Arroyo MD;  Location:  [facility]    ENT SURGERY  1974    tonsillectomy    ENTEROSCOPY SMALL BOWEL N/A 08/11/2022    Procedure: SMALL BOWEL ENTEROSCOPY;  Surgeon: Cosmo Arroyo MD;  [facility]    ESOPHAGOGASTRODUODENOSCOPY, WITH NASOGASTRIC TUBE INSERTION N/A 07/01/2022    Procedure: ESOPHAGOGASTRODUODENOSCOPY, WITH NASOJEJUNAL TUBE INSERTION;  Surgeon: Ozzy Nickerson MD;  [facility]: [facility]    ESOPHAGOSCOPY, GASTROSCOPY, DUODENOSCOPY (EGD), COMBINED N/A 08/03/2022    Procedure: ESOPHAGOGASTRODUODENOSCOPY (EGD);  Surgeon: Ira Andres MD;  [facility]    ESOPHAGOSCOPY, GASTROSCOPY, DUODENOSCOPY (EGD), COMBINED N/A 01/25/2023    Procedure: ESOPHAGOGASTRODUODENOSCOPY (EGD) with botox injection;  Surgeon: Gonzalez Navarro MD;  [facility]    ESOPHAGOSCOPY, GASTROSCOPY, DUODENOSCOPY (EGD), COMBINED N/A 10/09/2023    Procedure: ESOPHAGOGASTRODUODENOSCOPY;  Surgeon: Gonzalez Navarro MD;  [facility]    HAND SURGERY      INSERT CHEST TUBE Right 09/13/2022    Procedure: Insert chest tube;  Surgeon: Jose R Mccullough MD;  [facility]: [facility]    IR CVC TUNNEL PLACEMENT > 5 YRS OF AGE  09/26/2022    IR CVC TUNNEL PLACEMENT > 5 YRS OF AGE  02/14/2024    IR CVC TUNNEL REMOVAL RIGHT  3/6/2024    IR GASTRO JEJUNOSTOMY TUBE CHANGE  08/31/2022    IR GASTRO JEJUNOSTOMY TUBE CHANGE  12/21/2022    IR GASTRO JEJUNOSTOMY TUBE CHANGE  07/12/2023    IR GASTRO JEJUNOSTOMY TUBE CHANGE  08/18/2023    IR GASTRO JEJUNOSTOMY TUBE CHANGE  11/14/2023    IR GASTRO JEJUNOSTOMY TUBE CHANGE  4/8/2024    IR GASTRO JEJUNOSTOMY TUBE PLACEMENT  07/27/2022    IR THORACENTESIS  08/29/2022    LEEP TX, CERVICAL  04/07/2017    HECTOR III    LYMPH NODE BIOPSY Left 2005    Left axilla, benign- [place]    MIDLINE INSERTION - DOUBLE LUMEN Left 07/28/2022    20cm, Basilic vein    PICC DOUBLE LUMEN PLACEMENT Right 03/04/2024    5FR DL PICc, basiliv vein- L-36cm, 1cm out.    REPLACE GASTROJEJUNOSTOMY TUBE, PERCUTANEOUS  10/07/2022    Procedure: Replace  Gastrojejunostomy Tube;  Surgeon: Cosmo Arroyo MD;  [facility]    THORACENTESIS Left 08/29/2022    Procedure: THORACENTESIS;  Surgeon: Bo Capone PA-C;  Location: [facility] OR    THORACENTESIS Left 09/13/2022    Procedure: Thoracentesis;  Surgeon: Jose R Mccullough MD;  [facility]    THROMBECTOMY UPPER EXTREMITY Left 07/02/2022    Procedure: LEFT RADIAL ARM THROMBECTOMY;  Surgeon: Christie Graham MD;  [facility]    TRANSPLANT LUNG RECIPIENT SINGLE X2 Bilateral 06/28/2022    Procedure: Clamshell Incision, Bilateral Sequential Lung Transplant, On Cardiopulmonary Bypass, Flexible Bronchoscopy;  Surgeon: Sue Sunshine MD;  Location[facility]       MEDS:  Current Outpatient Medications   Medication Sig Dispense Refill    acetaminophen (TYLENOL) 500 MG tablet 500-1,000 mg by Per J Tube route 3 times daily as needed for mild pain      amiodarone (PACERONE) 200 MG tablet 1 tablet (200 mg) by Oral or Feeding Tube route daily 30 tablet 4    apixaban ANTICOAGULANT (ELIQUIS) 2.5 MG tablet Take 1 tablet (2.5 mg) by mouth 2 times daily 60 tablet 4    B Complex-C-Folic Acid (DIALYVITE) TABS 1 tablet by Per J Tube route every morning 30 tablet 11    Biotin 2500 MCG CHEW Take 2,500 mcg by mouth daily      calcium carbonate-vitamin D (CALTRATE) 600-10 MG-MCG per tablet 1 tablet by Per J Tube route 3 times daily (with meals) 90 tablet 0    Calcium Carbonate-Vitamin D 600-10 MG-MCG TABS 1 tablet by Per J Tube route 3 times daily 90 tablet 11    cloNIDine (CATAPRES) 0.1 MG tablet Take 1 tablet (0.1 mg) by mouth at bedtime 30 tablet 0    cycloSPORINE modified (GENERIC EQUIVALENT) 25 MG capsule Take 4 capsules (100 mg) by mouth every morning AND 4 capsules (100 mg) every evening. 240 capsule 11    fluticasone (FLONASE) 50 MCG/ACT nasal spray Spray 1 spray into both nostrils daily 9.9 mL 1    levothyroxine (SYNTHROID/LEVOTHROID) 25 MCG tablet Take 1 tablet (25 mcg) by mouth daily 30 tablet 4     Lidocaine (LIDOCARE) 4 % Patch Place 1 patch onto the skin every 24 hours To prevent lidocaine toxicity, patient should be patch free for 12 hrs daily. 30 patch 3    liothyronine (CYTOMEL) 5 MCG tablet Take 0.5 tablets (2.5 mcg) by mouth 2 times daily 15 tablet 0    loperamide (IMODIUM A-D) 2 MG tablet 1 tablet (2 mg) by Per J Tube route 4 times daily as needed for diarrhea      metoprolol tartrate (LOPRESSOR) 50 MG tablet 0.5 tablets (25 mg) by Per Feeding Tube route 2 times daily 60 tablet 11    midodrine (PROAMATINE) 10 MG tablet Take 1 tablet (10 mg) by mouth 2 times daily as needed (for map less than 65 or sbp less than 100 on HD days) 30 tablet 0    multivitamin (CENTRUM SILVER) tablet Take 1 tablet by mouth daily      OLANZapine zydis (ZYPREXA) 5 MG ODT Take 1 tablet (5 mg) by mouth at bedtime 30 tablet 4    predniSONE (DELTASONE) 5 MG tablet 1 tablet (5 mg) by Per J Tube route every morning 30 tablet 11    protein modular (PROSOURCE TF) LIQD 1 packet by Per Feeding Tube route daily 90 packet 3    senna-docusate (SENOKOT-S/PERICOLACE) 8.6-50 MG tablet Take 1 tablet by mouth 2 times daily as needed for constipation (Patient not taking: Reported on 5/7/2024) 300 tablet 0    sevelamer carbonate, RENVELA, 0.8 GM PACK Packet Take 1 packet (0.8 g) by mouth 3 times daily (with meals) (Patient taking differently: Take 0.8 g by mouth 5 times daily)      sulfamethoxazole-trimethoprim (BACTRIM) 400-80 MG tablet Take 1 tablet by mouth three times a week 30 tablet 2     No current facility-administered medications for this visit.     ALLERGIES:    Allergies   Allergen Reactions    Blood Transfusion Related (Informational Only) Other (See Comments)     Patient has a history of a clinically significant antibody against RBC antigens.  A delay in compatible RBCs may occur.     No Clinical Screening - See Comments Other (See Comments)     Patient has a history of a clinically significant antibody against RBC antigens.  A delay  in compatible RBCs may occur.    Azithromycin Rash     12/1/22: Developed a rash that is not raised and looks diffuse in nature. It started in the groin and up the back and has now worked its way up her chest into her face. Pt states that it has now started to itch. She is breathing and talking normally and denies any airway changes. Unclear what started rash. Pt noted feeling somewhat itchy yesterday.    Ganciclovir Rash     12/1/22: Developed a rash that is not raised and looks diffuse in nature. It started in the groin and up the back and has now worked its way up her chest into her face. Pt states that it has now started to itch. She is breathing and talking normally and denies any airway changes. Unclear what started rash. Pt noted feeling somewhat itchy yesterday.     FHx: Noncontributory    SOCIAL Hx:  Social History     Socioeconomic History    Marital status:      Spouse name: Not on file    Number of children: Not on file    Years of education: Not on file    Highest education level: Not on file   Occupational History    Not on file   Tobacco Use    Smoking status: Former     Current packs/day: 0.00     Average packs/day: 0.5 packs/day for 30.0 years (15.0 ttl pk-yrs)     Types: Cigarettes     Start date: 11/11/1990     Quit date: 11/11/2020     Years since quitting: 3.6    Smokeless tobacco: Never   Substance and Sexual Activity    Alcohol use: Not Currently     Comment: Stopped drinking in 2020    Drug use: Not Currently     Types: Marijuana, Methamphetamines     Comment: hx:marijuana and methamphetamine-quit both unsure ?  2-3 yrs ago    Sexual activity: Not on file   Other Topics Concern    Parent/sibling w/ CABG, MI or angioplasty before 65F 55M? Not Asked   Social History Narrative    Not on file     Social Determinants of Health     Financial Resource Strain: Low Risk  (5/7/2024)    [facility] from [facility]    Overall Financial Resource Strain (CARDIA)     Difficulty of  Paying Living Expenses: Not very hard   Food Insecurity: No Food Insecurity (5/7/2024)    [facility]    Hunger Vital Sign     Worried About Running Out of Food in the Last Year: Never true     Ran Out of Food in the Last Year: Never true   Transportation Needs: No Transportation Needs (5/7/2024)    Received from [facility]    Transportation Needs     In the past 12 months, has lack of transportation kept you from medical appointments, meetings, work, or from getting medicines or things needed for daily living?: No   Physical Activity: Sufficiently Active (5/7/2024)    Received from [facility]    Exercise Vital Sign     Days of Exercise per Week: 3 days     Minutes of Exercise per Session: 60 min   Recent Concern: Physical Activity - Inactive (4/17/2024)    Received from [facility]    Exercise Vital Sign     Days of Exercise per Week: 0 days     Minutes of Exercise per Session: 20 min   Stress: No Stress Concern Present (5/7/2024)    Received from [facility]    [employer] - Occupational Stress Questionnaire     Feeling of Stress : Not at all   Social Connections: Moderately Isolated (5/7/2024)    Received from [facility]    Social Connection and Isolation Panel [NHANES]     Frequency of Communication with Friends and Family: More than three times a week     Frequency of Social Gatherings with Friends and Family: Twice a week     Attends Denominational Services: 1 to 4 times per year     Active Member of Clubs or Organizations: No     Attends Club or Organization Meetings: Never     Marital Status:    Interpersonal Safety: Not At Risk (5/9/2024)    Received from [facility]    Intimate Partner Violence     Are you in a relationship where you are physically hurt, threatened and/or made to feel afraid?: No   Housing  "Stability: Unknown (5/7/2024)    [facility]    Housing Stability Vital Sign     Unable to Pay for Housing in the Last Year: No     Number of Times Moved in the Last Year: Not on file     Homeless in the Last Year: Not on file       ROS: A comprehensive Review of Systems was asked and answered in the negative unless specifically commented upon in the HPI    Physical Exam  Ht 1.575 m (5' 2\")   Wt 43.1 kg (95 lb)   BMI 17.38 kg/m    Body mass index is 17.38 kg/m .  Gen: A&Ox3, NAD  HEENT: Moist mucus membranes, no scleral icterus.  Lungs: no respiratory distress      LABS:  External Order Results on 12/30/2022   Component Date Value Ref Range Status    Sodium (External) 12/30/2022 137  136 - 146 mmol/L Final    Potassium (External) 12/30/2022 4.1  3.5 - 5.1 mmol/L Final    Chloride (External) (External) 12/30/2022 97 (L)  98 - 107 mmol/L Final    CO2 (External) 12/30/2022 27  22 - 29 mmol/L Final    Anion Gap (External) 12/30/2022 17.1  10.0 - 20.0 mmol/L Final    Creatinine (External) 12/30/2022 4.34  mg/dL Final    Urea Nitrogen (External) 12/30/2022 43.6 (H)  10.0 - 20.0 mg/dL Final    BUN/Creatinine Ratio (External) 12/30/2022 10.05 (L)  11.70 - 22.90 ratio Final    Calcium (External) 12/30/2022 9.5  8.6 - 10.5 mg/dL Final    Glucose (External) 12/30/2022 102 (H)  70 - 100 mg/dL Final    GFR Estimated (External) 12/30/2022 11 (L)  >=60 ml/min/1.73m2 Final    Magnesium (External) 12/30/2022 2.5  1.8 - 2.6 mg/dL Final    WBC Count (External) 12/30/2022 4.7  10(3)/uL Final    RBC Count (External) 12/30/2022 2.81  10(6)/uL Final    Hemoglobin (External) 12/30/2022 10.3  g/dL Final    Hematocrit (External) 12/30/2022 31.1  % Final    MCV (External) 12/30/2022 110.7  fL Final    MCH (External) 12/30/2022 36.5  pg Final    MCHC (External) 12/30/2022 33.0  32.0 - 36.0 g/dL Final    RDW (External) 12/30/2022 16.2  % Final    Platelet Count (External) 12/30/2022 251  179 - 450 10(3)/uL " Final         IMAGING:  EXAM:  NM GASTRIC EMPTYING, 1/15/2024 1:31 PM     HISTORY: 3 month post gastric poem, f/up gastroparesis; Gastroparesis     TECHNIQUE: The patient ingested 2 mCi of Tc-99m sulfur colloid in 2  eggs, 2 pieces toast with jam and water.. Static images were acquired  at approximately 1 hour intervals out through 4 hours using a dual  head gamma camera in an anterior and posterior position. The  calculation of emptying was based on dual head imaging with geometric  mean calculations.  A Linear square fit was calculated to the emptying  curve to determine the amount of residual activity at each time point.     COMPARISON: 1/2/2023     FINDINGS:   Percent retention at 60 min = 93%.  Percent retention at 120 min = 93%.  Percent retention at 180 min = 89%.  Percent retention at 240 min = 86%.     T 1/2 emptying time =N/A                                                                   IMPRESSION: Delayed gastric emptying (see normal ranges below). It is  worse than 1/2/2023. Now the percent retention at 4 hours is 86%,  before on 1/2/2023 75%.     EXAM:  NM GASTRIC EMPTYING, 1/2/2023 1:21 PM  HISTORY: Gastroparesis; Lung replaced by transplant (H)     TECHNIQUE: The patient ingested 2.1 mCi of Tc-99m sulfur colloid in 2  eggs, 2 pieces of toast w/ jelly. Static images were acquired at  approximately 1 hour intervals out through 4 hours using a dual head  gamma camera in an anterior and posterior position. The calculation of  emptying was based on dual head imaging with geometric mean  calculations.  A Linear square fit was calculated to the emptying  curve to determine the amount of residual activity at each time point.     FINDINGS:   Percent retention at 60 min = 87%.  Percent retention at 120 min = 77%.  Percent retention at 180 min = 77%.  Percent retention at 240 min = 75%.     T 1/2 emptying time =  238 mins.                                                                   IMPRESSION: Delayed  gastric emptying    EXAM:  NM GASTRIC EMPTYING, 9/30/2022 12:32 PM     HISTORY: severe delayed gastric emptying for follow up; is off  dialysis. NPO weds night     TECHNIQUE: The patient ingested 2.3 mCi of Tc-99m sulfur colloid in in  2 eggs, 1 slices of toast with jelly, and a glass of water. Static  images were acquired at approximately 1 hour intervals out through 4  hours using a dual head gamma camera in an anterior and posterior  position. The calculation of emptying was based on dual head imaging  with geometric mean calculations.  A Linear square fit was calculated  to the emptying curve to determine the amount of residual activity at  each time point.     FINDINGS:   Percent retention at 60 min = 97%.  Percent retention at 120 min = 95%.  Percent retention at 180 min = 91%.  Percent retention at 240 min = 91%.     T 1/2 emptying time =  Too long to calculate.                                                                      IMPRESSION: Severe delayed gastric emptying    Endoscopies:  Upper GI Endoscopy 10/09/2023 11:50 AM [address] [address] [phone]     Endoscopy Department  _______________________________________________________________________________  Patient Name: Sofie Rodriguez            Procedure Date: 10/9/2023 11:50 AM  MRN: [id number]                       Account Number: [id number]  YOB: 1960               Admit Type: Outpatient  Age: 63                               Room:  OR   Gender: Female                        Note Status: Supervisor Override  Attending MD: OSKAR PÉREZ MD,      Pause for the Cause: time out performed  Total Sedation Time:                    _______________________________________________________________________________     Procedure:             Upper GI endoscopy  Indications:           For therapy of gastroparesis; 63 year old female with                         a PMHx of  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 Diff, h/o EBV                         viremia, ESRD on dialysis, hemobilia s/p ERCP                         presumably due to hemorrhage into gallbladder,                         gastroparesis s/p PEGJ and diarrhea related to SIBO.                         Responded previously to pyloric botox. Patient here                         for G-POEM. GCSI= 3,0,0,3,2,2,3,4,4=21  Providers:             OSKAR PÉREZ MD  Referring MD:            Requesting Provider:   MICHELE SOSA MD  Medicines:             General Anesthesia, IV Zosyn 3.375g  Complications:         No immediate complications. Estimated blood loss:                         Minimal.  _______________________________________________________________________________  Procedure:             Pre-Anesthesia Assessment:                         - Prior to the procedure, a History and Physical was                         performed, and patient medications and allergies were                         reviewed. The patient is competent. The risks and                         benefits of the procedure and the sedation options and                         risks were discussed with the patient. All questions                         were answered and informed consent was obtained.                         Patient identification and proposed procedure were                         verified by the physician, the nurse, the                         anesthesiologist and the anesthetist in the procedure                         room. Mental Status Examination: alert and oriented.                         Airway Examination: normal oropharyngeal airway and                         neck mobility. Respiratory Examination: clear to                         auscultation. CV Examination: normal. Prophylactic                          Antibiotics: The patient requires prophylactic                         antibiotics peroral endoscopic myotomy. Prior                         Anticoagulants: The patient has taken no anticoagulant                         or antiplatelet agents. ASA Grade Assessment: III - A                         patient with severe systemic disease. After reviewing                         the risks and benefits, the patient was deemed in                         satisfactory condition to undergo the procedure. The                         anesthesia plan was to use general anesthesia.                         Immediately prior to administration of medications,                         the patient was re-assessed for adequacy to receive                         sedatives. The heart rate, respiratory rate, oxygen                         saturations, blood pressure, adequacy of pulmonary                         ventilation, and response to care were monitored                         throughout the procedure. The physical status of the                         patient was re-assessed after the procedure.                         After obtaining informed consent, the endoscope was                         passed under direct vision. Throughout the procedure,                         the patient's blood pressure, pulse, and oxygen                         saturations were monitored continuously. The Endoscope                         was introduced through the mouth, and advanced to the                         second part of duodenum. The was introduced through                         the mouth, and advanced to the. After obtaining                         informed consent, the endoscope was passed under                         direct vision. Throughout the procedure, the patient's                         blood pressure, pulse, and oxygen saturations were                         monitored continuously.The endoscopic myotomy was                          accomplished without difficulty. The patient tolerated                         the procedure well.                                                                                  Findings:       The esophagus was normal.       There was evidence of an intact gastrostomy with a patent GJ-tube       present in the gastric body extending into the duodneum. This was       characterized by healthy appearing mucosa. Small amount of retained food       and fluid in the stomach.       The examined duodenum was normal.       Localized moderate mucosal changes characterized by nodularity were       found at the pylorus at the 11 o'clock position. Likely hyperplastic       from GJ tube.       Preparations were made for gastric peroral endoscopic myotomy (G-POEM).       The stomach was cleaned and irrigated using saline and suctioned.       Mucosotomy followed by myotomy were performed in a greater curvature       orientation. First, a submucosal injection of a solution of methylene       blue and saline was used to lift the mucosa at the site of the initial       mucosotomy. The initial mucosal incision was made transversely at       approximately 4.0 cm proximal to the pylorus using a HybridKnife I-Type.       Next, the endoscope with a clear cap was used to enter into the       submucosal tunnel. The submucosal tunnel was then further created by       dissection of the submucosal fibers distally to the pyloric ring. A full       thickness myotomy (in a greater curvature orientation) was then       performed beginning at the pylorus using an ITknife2. The myotomy was       extended to 3.0 cm proximal to the pylorus. Intra procedure bleeding was       mild. A small Coagrasper was used effectively for hemostasis. After       completion of the myotomy, examination of the stomach and duodenum       showed no inadvertent mucosotomy. There was no evidence of bleeding       noted on the inspection of the  myotomy edges and submucosal tunnel. The       myotomy was successfully performed. The mucosal entrance to the       submucosal tunnel was closed using endoscopic suturing. After G-POEM and       endoscope removal, the patient was examined. The patient's abdomen was       nondistended. Pylorus lumen open without resistance to passage.                                                                                   Impression:            - Normal esophagus.                         - GJ tube in place and not manipulated                         - Normal examined duodenum.                         - Nodularity was found at the pylorus at the 11                         o'clock position. Likely hyperplastic from GJ tube.                         - Gastric peroral endoscopic myotomy (G-POEM) was                         performed along greater curvature as described above.                         Mucosotomy closed with endoscopic suturing.                         - No specimens collected.  Recommendation:        - Monitor in PACU and admit to medicine for                         observation.                         - NPO overnight. Leave G-tube to gravity and can start                         J-tube feeds tonight as tolerated                         - Started pantoprazole 40 mg IV BID.                         - Upon discharge, prescribe pantoprazole 40 mg PO BID                         x1-month.                         - Continue Zosyn IV while inpatient.                         - Patient had a positive breath test for SIBO but                         insurance denied Rifaximin 550 mg TID x 2 weeks.                         Consider attempting re-prescribing on discharge as did                         not respond to course of cipro. Does not need                         additional antibiotics related to procedure.                         - No anticoagulation or antiplatelets for 3-days.                         - Ordered head  of bed elevation to 30-45 degrees.                         - Ordered incentive spirometry.                         - Order analgesia IV & antiemetics IV PRN.                         - Ordered CBC & BMP tomorrow AM                         - Ordered Upper GI series tomorrow AM to rule-out                         post-POEM gastric leak. Pneumoperitoneum expected.                         - If no gastric leak, and otherwise doing clinically                         well discharge home tomorrow and start full liquid diet                         - Liquid diet for three days starting tomorrow. Soft                         diet on day 4, 5,6. Regular diet starting on POD#7 as                         tolerated                         - Virtual follow-up with Dr. Navarro in 1-month.                                                                                      Again, thank you for allowing me to participate in the care of your patient.      Sincerely,    Gonzalez Navarro MD

## 2024-06-19 NOTE — NURSING NOTE
Patient confirms medications and allergies are accurate via patients echeck in completion, and or denies any changes since last reviewed/verified. Is the patient currently in the state of [place]? YES    Visit mode:VIDEO    If the visit is dropped, the patient can be reconnected by: TELEPHONE VISIT: Phone number:   Telephone Information:   Mobile [phone]       Will anyone else be joining the visit? NO  (If patient encounters technical issues they should call [phone]    How would you like to obtain your AVS? MyChart    Are changes needed to the allergy or medication list? No    Are refills needed on medications prescribed by this physician? NO    Reason for visit: RECHECK    Bita SERRANO

## 2024-06-24 NOTE — PROGRESS NOTES
[facility]  June 26, 2024         Assessment and Plan:   Sofie Rodriguez is a 64 year old female with h/o COPD s/p BSLT 6/28/22 with course complicated by post-operative hemidiaphragm palsy, recurrent PNAs, positive DSA, EBV viremia, hypogammaglobulinemia, severe gastroparesis s/p G/J tube placement and pyloric botox (1/25/23), GI bleed 2/2 pyloric ulcer, hemobilia s/p ERCP and MRCP, chronic diarrhea, recurrent C diff colitis, ESRD on iHD, recurrent falls with injuries (recent right hip fx s/p ORIF 12/2023), deconditioning, and FTT.  Admitted 2/10-4/15 for failure to thrive and initiation of TPN/lipids. Emergent intubation 2/17-2/19 for hypoxic and hypercapneic respiratory failure and encephalopathy. Returned to ICU multiple times for tenuous respiratory status complicated by variable daytime NIPPV tolerance and hypervolemia with iHD limited d/t hypotension. Discharged to home NIPPV device. She continues to struggle with oral and TF intolerance and very low BMI. She was advised to follow up with Palliative re: goals of care which she declined.      1. Chronic hypoxic/hypercapneic respiratory failure:  S/p bilateral sequential lung transplant:   Suspected CARLEE: denies new pulmonary complaints today. States she is using her AVAPs 5-6 hours per night, but cannot tolerate it further secondary to dry mouth. Continues to not steve the device with naps. CO2 remains high (70s), but stable. Sating 98% on room air in clinic. CMV 5/14 negative and Prospera of 0.62 on 5/14. CXR reviewed today and demonstrates stable transplant. PFTs improved 200 ml from prior.  - Advisedpatient to follow up with her AVAPs Jcarlos GALVAN at [facility] re: troubleshooting dry mouth  - Highly recommend wearing her AVAPs with all naps  - Continue IS including CSA (goal 120-14) and prednisone 5 mg daily  - AZA previously stopped 5/2023 d/t low ImmuKnow assay and EBV viremia  - Bactrim qMWF for PJP ppx      2. Positive DSA: [facility] treatment deferred given frailty and stable PFTs. DSA has been elevated for multiple months, most recently 6948 (DQB2) on 5/14. Most recent Prospera < 1.0 per above.    - DSA pending  - Continue to monitor Prospera, ordered for next visit    3. FTT:  Severe protein calorie malnutrition:  Gastroparesis s/p PEG/J, botox, and G-POEM:  SB hypomotility:  Pyloric ulcer:  Chronic nausea and osmotic diarrhea:  SIBO s/p rifaximin:   Recurrent C diff colitis: chronic osmotic and infectious diarrhea since transplant with recurrent episodes of C diff.  Notable weight loss (40# in a year) d/t diarrhea, GI dysmotility, and intolerance of enteral feeds (PEG/J tube in place), most recently on elemental formula. Extensive OP eval and f/u with GI. S/p port placement for TPN and lipids. Continued for ~5 weeks inpatient without considerable improvement, transitioned back to TF. Colonoscopy recommended by GI during last admission, but pt. deferred d/t risk for reintubation and improvement in diarrhea today. Today, patient weighs 84.8 lbs which is up from prior. States she is tolerating more TF and some oral intake. Stools are soft, but stable.  - Continue TF, increase protein to two packets daily  - Following with Dr. Navarro who suggested possible trial of prucalopride, which she has decided to hold off on taking    4. ESRD: no on dialysis T/Th/Sat. Was unable to tolerate the volume associated with TPN.     5. A-flutter (recurrent on 3/17)  A-fib, intermittent  HTN  HFpEF: intermittently throughout admission. Was started on amiodarone bolus with drip and transitioned to PO dosing with no end date. No current issues.  - Continue metoprolol, amiodarone and apixaban  - Needs EP follow up    6. Hypothyroidism: ICU team on 2/28 recommended initiation of treatment for possible hypothyroid as this can improve respiratory muscle function in the setting of weakness and malnutrition.   - Continue liothyronine +  "levothyroxine, needs follow up with PCP     RTC: 2 months  Vaccinations:   Preventative: DEXA > June 2025;     Kaylin Triana PA-C  Pulmonary, Allergy, Critical Care and Sleep Medicine        Interval History:     Breathing is good, no new shortness of breath, minimal congestion or tightness. No chest pain or palpitations, no allergy issues. Wearing her AVAPS 5-6 hours/night, cannot tolerate it more secondary a dry mouth. Using her AVAPs only at night for 5-6 hours at night, not with naps. Nausea is okay, continues to be the determining factor for tolerating her TF. BMs are \"good\" states it's been 2-3 months that BMs have been improved to 1-2 stools per day. Doing 2-3 cartons/day at 60 ml.           Review of Systems:   Please see HPI, otherwise the complete 10 point ROS is negative.           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    Hepatitis 2017    Hep C, [facility]    History of blood transfusion     HTN (hypertension)     Infectious mononucleosis     Lung infection 11/30/2022    Osteopenia      Past Surgical History:   Procedure Laterality Date    BRONCHOSCOPY (RIGID OR FLEXIBLE), DIAGNOSTIC N/A 08/02/2022    Procedure: BRONCHOSCOPY, DIAGNOSTIC- inspection Bronch;  Surgeon: Kamala Lovell MD;  [facility]    BRONCHOSCOPY (RIGID OR FLEXIBLE), DIAGNOSTIC N/A 09/13/2022    Procedure: INSPECTION BRONCHOSCOPY, WITH BRONCHOALVEOLAR LAVAGE;  Surgeon: Jose R Mccullough MD;  [facility]    BRONCHOSCOPY (RIGID OR FLEXIBLE), DIAGNOSTIC N/A 11/09/2022    Procedure: BRONCHOSCOPY, WITH BRONCHOALVEOLAR LAVAGE AND BIOPSY;  Surgeon: Cesar Lima MD;  Location:  GI    BRONCHOSCOPY (RIGID OR FLEXIBLE), DIAGNOSTIC N/A 01/25/2023    Procedure: BRONCHOSCOPY, " WITH BRONCHOALVEOLAR LAVAGE AND BIOPSY;  Surgeon: Mason Reddy MD;  Location:  GI    BRONCHOSCOPY (RIGID OR FLEXIBLE), DIAGNOSTIC N/A 04/19/2023    Procedure: BRONCHOSCOPY, WITH BRONCHOALVEOLAR LAVAGE AND BIOPSY;  Surgeon: Kamala Lovell MD;  Location:  GI    BRONCHOSCOPY (RIGID OR FLEXIBLE), DIAGNOSTIC N/A 07/12/2023    Procedure: BRONCHOSCOPY, WITH BRONCHOALVEOLAR LAVAGE AND BIOPSY;  Surgeon: Cesar Lima MD;  Location:  GI    BRONCHOSCOPY FLEXIBLE AND RIGID N/A 07/19/2022    Procedure: BRONCHOSCOPY inspection only;  Surgeon: Bob Liao MD;  Location:  GI    COLONOSCOPY  2015    CORONARY ANGIOGRAPHY ADULT ORDER      CV CORONARY ANGIOGRAM N/A 06/30/2021    Procedure: CV CORONARY ANGIOGRAM;  Surgeon: Alexander Cuellar MD;  Location:  HEART CARDIAC CATH LAB    CV RIGHT HEART CATH MEASUREMENTS RECORDED N/A 06/30/2021    Procedure: CV RIGHT HEART CATH;  Surgeon: Alexander Cuellar MD;  Location:  HEART CARDIAC CATH LAB    ENDOSCOPIC PERORAL MYOTOMY N/A 10/09/2023    Procedure: MYOTOMY, ESOPHAGUS, ENDOSCOPIC, ORAL APPROACH;  Surgeon: Gonzalez Navarro MD;  Location:  OR    ENDOSCOPIC RETROGRADE CHOLANGIOPANCREATOGRAM N/A 08/11/2022    Procedure: ENDOSCOPIC RETROGRADE CHOLANGIOPANCREATOGRAPHY WITH PANCREATIC DUCT NEEDLE KNIFE AND STENT PLACEMENT, BILE DUCT SPHINCTEROTOMY, BLOOD/DEBRIS REMOVAL AND STENT PLACEMENT;  Surgeon: Cosmo Arroyo MD;  Location:  OR    ENDOSCOPIC RETROGRADE CHOLANGIOPANCREATOGRAM N/A 10/07/2022    Procedure: ENDOSCOPIC RETROGRADE CHOLANGIOPANCREATOGRAPHY with biliary and pancreatic stent removal, debris removal;  Surgeon: Cosmo Arroyo MD;  Location:  OR    ENT SURGERY  1974    tonsillectomy    ENTEROSCOPY SMALL BOWEL N/A 08/11/2022    Procedure: SMALL BOWEL ENTEROSCOPY;  Surgeon: Cosmo Arroyo MD;  Location:  OR    ESOPHAGOGASTRODUODENOSCOPY, WITH NASOGASTRIC TUBE INSERTION N/A 07/01/2022    Procedure: ESOPHAGOGASTRODUODENOSCOPY,  WITH NASOJEJUNAL TUBE INSERTION;  Surgeon: Ozzy Nickerson MD;  [facility]    ESOPHAGOSCOPY, GASTROSCOPY, DUODENOSCOPY (EGD), COMBINED N/A 08/03/2022    Procedure: ESOPHAGOGASTRODUODENOSCOPY (EGD);  Surgeon: Ira Andres MD;  Location:  GI    ESOPHAGOSCOPY, GASTROSCOPY, DUODENOSCOPY (EGD), COMBINED N/A 01/25/2023    Procedure: ESOPHAGOGASTRODUODENOSCOPY (EGD) with botox injection;  Surgeon: Gonzalez Navarro MD;  Location:  GI    ESOPHAGOSCOPY, GASTROSCOPY, DUODENOSCOPY (EGD), COMBINED N/A 10/09/2023    Procedure: ESOPHAGOGASTRODUODENOSCOPY;  Surgeon: Gonzalez Navarro MD;  [facility]    HAND SURGERY      INSERT CHEST TUBE Right 09/13/2022    Procedure: Insert chest tube;  Surgeon: Jose R Mccullough MD;  [facility]    IR CVC TUNNEL PLACEMENT > 5 YRS OF AGE  09/26/2022    IR CVC TUNNEL PLACEMENT > 5 YRS OF AGE  02/14/2024    IR CVC TUNNEL REMOVAL RIGHT  3/6/2024    IR GASTRO JEJUNOSTOMY TUBE CHANGE  08/31/2022    IR GASTRO JEJUNOSTOMY TUBE CHANGE  12/21/2022    IR GASTRO JEJUNOSTOMY TUBE CHANGE  07/12/2023    IR GASTRO JEJUNOSTOMY TUBE CHANGE  08/18/2023    IR GASTRO JEJUNOSTOMY TUBE CHANGE  11/14/2023    IR GASTRO JEJUNOSTOMY TUBE CHANGE  4/8/2024    IR GASTRO JEJUNOSTOMY TUBE PLACEMENT  07/27/2022    IR THORACENTESIS  08/29/2022    LEEP TX, CERVICAL  04/07/2017    HECTOR III    LYMPH NODE BIOPSY Left 2005    Left axilla, benign- [place]    MIDLINE INSERTION - DOUBLE LUMEN Left 07/28/2022    20cm, Basilic vein    PICC DOUBLE LUMEN PLACEMENT Right 03/04/2024    5FR DL PICc, basiliv vein- L-36cm, 1cm out.    REPLACE GASTROJEJUNOSTOMY TUBE, PERCUTANEOUS  10/07/2022    Procedure: Replace Gastrojejunostomy Tube;  Surgeon: Cosmo Arroyo MD;  [facility]    THORACENTESIS Left 08/29/2022    Procedure: THORACENTESIS;  Surgeon: Bo Capone PA-C;  Location: [facility] OR    THORACENTESIS Left 09/13/2022    Procedure: Thoracentesis;  Surgeon: Jose R Mccullough MD;  Location:   GI    THROMBECTOMY UPPER EXTREMITY Left 07/02/2022    Procedure: LEFT RADIAL ARM THROMBECTOMY;  Surgeon: Christie Graham MD;  [facility]    TRANSPLANT LUNG RECIPIENT SINGLE X2 Bilateral 06/28/2022    Procedure: Clamshell Incision, Bilateral Sequential Lung Transplant, On Cardiopulmonary Bypass, Flexible Bronchoscopy;  Surgeon: Sue Sunshine MD;  [facility]           Family History:     History reviewed. No pertinent family history.         Social History:     Social History     Socioeconomic History    Marital status:      Spouse name: Not on file    Number of children: Not on file    Years of education: Not on file    Highest education level: Not on file   Occupational History    Not on file   Tobacco Use    Smoking status: Former     Current packs/day: 0.00     Average packs/day: 0.5 packs/day for 30.0 years (15.0 ttl pk-yrs)     Types: Cigarettes     Start date: 11/11/1990     Quit date: 11/11/2020     Years since quitting: 3.6    Smokeless tobacco: Never   Substance and Sexual Activity    Alcohol use: Not Currently     Comment: Stopped drinking in 2020    Drug use: Not Currently     Types: Marijuana, Methamphetamines     Comment: hx:marijuana and methamphetamine-quit both unsure ?  2-3 yrs ago    Sexual activity: Not on file   Other Topics Concern    Parent/sibling w/ CABG, MI or angioplasty before 65F 55M? Not Asked   Social History Narrative    Not on file     Social Determinants of Health     Financial Resource Strain: Low Risk  (5/7/2024)    [facility]    Overall Financial Resource Strain (CARDIA)     Difficulty of Paying Living Expenses: Not very hard   Food Insecurity: No Food Insecurity (5/7/2024)    [facility]    Hunger Vital Sign     Worried About Running Out of Food in the Last Year: Never true     Ran Out of Food in the Last Year: Never true   Transportation Needs: No Transportation Needs  (5/7/2024)    Received from [facility]    Transportation Needs     In the past 12 months, has lack of transportation kept you from medical appointments, meetings, work, or from getting medicines or things needed for daily living?: No   Physical Activity: Sufficiently Active (5/7/2024)    Received from [facility]    Exercise Vital Sign     Days of Exercise per Week: 3 days     Minutes of Exercise per Session: 60 min   Recent Concern: Physical Activity - Inactive (4/17/2024)    Received from [facility]    Exercise Vital Sign     Days of Exercise per Week: 0 days     Minutes of Exercise per Session: 20 min   Stress: No Stress Concern Present (5/7/2024)    Received from [facility]    [employer] [place] - Occupational Stress Questionnaire     Feeling of Stress : Not at all   Social Connections: Moderately Isolated (5/7/2024)    Received from [facility]    Social Connection and Isolation Panel [NHANES]     Frequency of Communication with Friends and Family: More than three times a week     Frequency of Social Gatherings with Friends and Family: Twice a week     Attends Orthodox Services: 1 to 4 times per year     Active Member of Clubs or Organizations: No     Attends Club or Organization Meetings: Never     Marital Status:    Interpersonal Safety: Not At Risk (5/9/2024)    Received from [facility]    Intimate Partner Violence     Are you in a relationship where you are physically hurt, threatened and/or made to feel afraid?: No   Housing Stability: Unknown (5/7/2024)    Received from [facility]    Housing Stability Vital Sign     Unable to Pay for Housing in the Last Year: No     Number of Times Moved in the Last Year: Not on file     Homeless in the Last Year: Not on file            Medications:     Current Outpatient Medications    Medication Sig Dispense Refill    acetaminophen (TYLENOL) 500 MG tablet 500-1,000 mg by Per J Tube route 3 times daily as needed for mild pain      amiodarone (PACERONE) 200 MG tablet 1 tablet (200 mg) by Oral or Feeding Tube route daily 30 tablet 4    apixaban ANTICOAGULANT (ELIQUIS) 2.5 MG tablet Take 1 tablet (2.5 mg) by mouth 2 times daily 60 tablet 4    B Complex-C-Folic Acid (DIALYVITE) TABS 1 tablet by Per J Tube route every morning 30 tablet 11    Biotin 2500 MCG CHEW Take 2,500 mcg by mouth daily      calcium carbonate-vitamin D (CALTRATE) 600-10 MG-MCG per tablet 1 tablet by Per J Tube route 3 times daily (with meals) 90 tablet 0    Calcium Carbonate-Vitamin D 600-10 MG-MCG TABS 1 tablet by Per J Tube route 3 times daily 90 tablet 11    cloNIDine (CATAPRES) 0.1 MG tablet Take 1 tablet (0.1 mg) by mouth at bedtime 30 tablet 0    cycloSPORINE modified (GENERIC EQUIVALENT) 25 MG capsule Take 4 capsules (100 mg) by mouth every morning AND 4 capsules (100 mg) every evening. 240 capsule 11    fluticasone (FLONASE) 50 MCG/ACT nasal spray Spray 1 spray into both nostrils as needed for rhinitis or allergies      levothyroxine (SYNTHROID/LEVOTHROID) 25 MCG tablet Take 1 tablet (25 mcg) by mouth daily 30 tablet 4    Lidocaine (LIDOCARE) 4 % Patch Place 1 patch onto the skin every 24 hours To prevent lidocaine toxicity, patient should be patch free for 12 hrs daily. 30 patch 3    liothyronine (CYTOMEL) 5 MCG tablet Take 0.5 tablets (2.5 mcg) by mouth 2 times daily 15 tablet 0    loperamide (IMODIUM A-D) 2 MG tablet 1 tablet (2 mg) by Per J Tube route 4 times daily as needed for diarrhea      metoprolol tartrate (LOPRESSOR) 50 MG tablet 0.5 tablets (25 mg) by Per Feeding Tube route 2 times daily 60 tablet 11    midodrine (PROAMATINE) 10 MG tablet Take 1 tablet (10 mg) by mouth 2 times daily as needed (for map less than 65 or sbp less than 100 on HD days) 30 tablet 0    multivitamin (CENTRUM SILVER) tablet Take 1  "tablet by mouth daily      predniSONE (DELTASONE) 5 MG tablet 1 tablet (5 mg) by Per J Tube route every morning 30 tablet 11    protein modular (PROSOURCE TF) LIQD 2 packets by Per Feeding Tube route daily      senna-docusate (SENOKOT-S/PERICOLACE) 8.6-50 MG tablet Take 1 tablet by mouth 2 times daily as needed for constipation 300 tablet 0    sevelamer carbonate, RENVELA, 0.8 GM PACK Packet Take 1 packet (0.8 g) by mouth 3 times daily (with meals) (Patient taking differently: Take 0.8 g by mouth 5 times daily)      sulfamethoxazole-trimethoprim (BACTRIM) 400-80 MG tablet Take 1 tablet by mouth three times a week 30 tablet 2     No current facility-administered medications for this visit.            Physical Exam:   BP (!) 144/77   Pulse 77   Ht 1.6 m (5' 3\")   Wt 38.5 kg (84 lb 12.8 oz)   SpO2 98%   BMI 15.02 kg/m      GENERAL: ill appearing, NAD  HEENT: NCAT, EOMI, no scleral icterus, oral mucosa moist and without lesions  Neck: no cervical or supraclavicular adenopathy  Lungs: moderate airflow, mainly clear  CV: RRR, S1S2, no murmurs noted  Abdomen: cachectic appearing, normoactive BS  Lymph: no edema  Neuro: AAO X 3, CN 2-12 grossly intact  Psychiatric: normal affect, good eye contact  Skin: no rash, jaundice or lesions on limited exam         Data:   All laboratory and imaging data reviewed.      Recent Results (from the past 168 hour(s))   Blood gas venous    Collection Time: 06/26/24 11:51 AM   Result Value Ref Range    pH Venous 7.22 (L) 7.32 - 7.43    pCO2 Venous 74 (H) 40 - 50 mm Hg    pO2 Venous 20 (L) 25 - 47 mm Hg    Bicarbonate Venous 31 (H) 21 - 28 mmol/L    Base Excess/Deficit Venous 1.5 -3.0 - 3.0 mmol/L    FIO2 21     Oxyhemoglobin Venous 24 (L) 70 - 75 %    O2 Sat, Venous 24.1 (L) 70.0 - 75.0 %   CBC with platelets    Collection Time: 06/26/24 11:53 AM   Result Value Ref Range    WBC Count 6.0 4.0 - 11.0 10e3/uL    RBC Count 3.01 (L) 3.80 - 5.20 10e6/uL    Hemoglobin 10.1 (L) 11.7 - 15.7 g/dL "    Hematocrit 32.7 (L) 35.0 - 47.0 %     (H) 78 - 100 fL    MCH 33.6 (H) 26.5 - 33.0 pg    MCHC 30.9 (L) 31.5 - 36.5 g/dL    RDW 17.2 (H) 10.0 - 15.0 %    Platelet Count 215 150 - 450 10e3/uL   CMV Quantitative, PCR (Blood)    Collection Time: 06/26/24 11:53 AM    Specimen: Arm, Right; Blood   Result Value Ref Range    CMV DNA IU/mL Not Detected Not Detected IU/mL   Comprehensive metabolic panel    Collection Time: 06/26/24 11:53 AM   Result Value Ref Range    Sodium 140 135 - 145 mmol/L    Potassium 4.7 3.4 - 5.3 mmol/L    Carbon Dioxide (CO2) 27 22 - 29 mmol/L    Anion Gap 14 7 - 15 mmol/L    Urea Nitrogen 48.7 (H) 8.0 - 23.0 mg/dL    Creatinine 6.61 (H) 0.51 - 0.95 mg/dL    GFR Estimate 7 (L) >60 mL/min/1.73m2    Calcium 9.5 8.8 - 10.2 mg/dL    Chloride 99 98 - 107 mmol/L    Glucose 103 (H) 70 - 99 mg/dL    Alkaline Phosphatase 70 40 - 150 U/L    AST 27 0 - 45 U/L    ALT 12 0 - 50 U/L    Protein Total 6.6 6.4 - 8.3 g/dL    Albumin 3.8 3.5 - 5.2 g/dL    Bilirubin Total 0.4 <=1.2 mg/dL   Magnesium    Collection Time: 06/26/24 11:53 AM   Result Value Ref Range    Magnesium 2.5 (H) 1.7 - 2.3 mg/dL   Cyclosporine by Tandem Mass Spectrometry    Collection Time: 06/26/24 11:53 AM   Result Value Ref Range    Cyclosporine 131 50 - 400 ug/L    Cyclosporine Last Dose Date 6/25/2024     Cyclosporine Last Dose Time 10:30 PM    General PFT Lab (Please always keep checked)    Collection Time: 06/26/24 12:03 PM   Result Value Ref Range    FVC-Pred 2.76 L    FVC-Pre 1.36 L    FVC-%Pred-Pre 49 %    FEV1-Pre 1.33 L    FEV1-%Pred-Pre 60 %    FEV1FVC-Pred 80 %    FEV1FVC-Pre 98 %    FEFMax-Pred 6.03 L/sec    FEFMax-Pre 5.13 L/sec    FEFMax-%Pred-Pre 85 %    FEF2575-Pred 2.03 L/sec    FEF2575-Pre 3.25 L/sec    MPV3684-%Pred-Pre 160 %    ExpTime-Pre 4.84 sec    FIFMax-Pre 4.11 L/sec    FEV1FEV6-Pred 80 %    FEV1FEV6-Pre 98 %     PFT interpretation:  Maneuver: valid and meets ATS guidelines  Moderate obstruction based on Z  score  Compared to prior: FEV1 of 1.33 is 200 ml above prior  The decrease in FVC may represent air trapping v. restrictive physiology.  Lung volumes would be necessary to determine.

## 2024-06-25 DIAGNOSIS — J98.6 DIAPHRAGM DYSFUNCTION: ICD-10-CM

## 2024-06-26 ENCOUNTER — OFFICE VISIT (OUTPATIENT)
Dept: PULMONOLOGY | Facility: CLINIC | Age: 64
End: 2024-06-26
Attending: PHYSICIAN ASSISTANT
Payer: MEDICARE

## 2024-06-26 ENCOUNTER — LAB (OUTPATIENT)
Dept: LAB | Facility: CLINIC | Age: 64
End: 2024-06-26
Attending: PHYSICIAN ASSISTANT
Payer: MEDICARE

## 2024-06-26 ENCOUNTER — ANCILLARY PROCEDURE (OUTPATIENT)
Dept: GENERAL RADIOLOGY | Facility: CLINIC | Age: 64
End: 2024-06-26
Attending: PHYSICIAN ASSISTANT
Payer: MEDICARE

## 2024-06-26 VITALS
SYSTOLIC BLOOD PRESSURE: 144 MMHG | BODY MASS INDEX: 15.02 KG/M2 | DIASTOLIC BLOOD PRESSURE: 77 MMHG | HEART RATE: 77 BPM | WEIGHT: 84.8 LBS | HEIGHT: 63 IN | OXYGEN SATURATION: 98 %

## 2024-06-26 DIAGNOSIS — D84.9 IMMUNOSUPPRESSED STATUS (H): ICD-10-CM

## 2024-06-26 DIAGNOSIS — Z94.2 LUNG REPLACED BY TRANSPLANT (H): ICD-10-CM

## 2024-06-26 DIAGNOSIS — Z79.52 CURRENT CHRONIC USE OF SYSTEMIC STEROIDS: ICD-10-CM

## 2024-06-26 DIAGNOSIS — R79.9 ABNORMAL FINDING OF BLOOD CHEMISTRY, UNSPECIFIED: ICD-10-CM

## 2024-06-26 DIAGNOSIS — Z94.2 LUNG TRANSPLANT STATUS, BILATERAL (H): ICD-10-CM

## 2024-06-26 DIAGNOSIS — Z94.2 LUNG REPLACED BY TRANSPLANT (H): Primary | ICD-10-CM

## 2024-06-26 DIAGNOSIS — Z94.2 S/P LUNG TRANSPLANT (H): ICD-10-CM

## 2024-06-26 DIAGNOSIS — Z94.2 S/P LUNG TRANSPLANT (H): Primary | ICD-10-CM

## 2024-06-26 LAB
ALBUMIN SERPL BCG-MCNC: 3.8 G/DL (ref 3.5–5.2)
ALP SERPL-CCNC: 70 U/L (ref 40–150)
ALT SERPL W P-5'-P-CCNC: 12 U/L (ref 0–50)
ANION GAP SERPL CALCULATED.3IONS-SCNC: 14 MMOL/L (ref 7–15)
AST SERPL W P-5'-P-CCNC: 27 U/L (ref 0–45)
BASE EXCESS BLDV CALC-SCNC: 1.5 MMOL/L (ref -3–3)
BILIRUB SERPL-MCNC: 0.4 MG/DL
BUN SERPL-MCNC: 48.7 MG/DL (ref 8–23)
CALCIUM SERPL-MCNC: 9.5 MG/DL (ref 8.8–10.2)
CHLORIDE SERPL-SCNC: 99 MMOL/L (ref 98–107)
CMV DNA SPEC NAA+PROBE-ACNC: NOT DETECTED IU/ML
CREAT SERPL-MCNC: 6.61 MG/DL (ref 0.51–0.95)
CYCLOSPORINE BLD LC/MS/MS-MCNC: 131 UG/L (ref 50–400)
DEPRECATED HCO3 PLAS-SCNC: 27 MMOL/L (ref 22–29)
EGFRCR SERPLBLD CKD-EPI 2021: 7 ML/MIN/1.73M2
ERYTHROCYTE [DISTWIDTH] IN BLOOD BY AUTOMATED COUNT: 17.2 % (ref 10–15)
EXPTIME-PRE: 4.84 SEC
FEF2575-%PRED-PRE: 160 %
FEF2575-PRE: 3.25 L/SEC
FEF2575-PRED: 2.03 L/SEC
FEFMAX-%PRED-PRE: 85 %
FEFMAX-PRE: 5.13 L/SEC
FEFMAX-PRED: 6.03 L/SEC
FEV1-%PRED-PRE: 60 %
FEV1-PRE: 1.33 L
FEV1FEV6-PRE: 98 %
FEV1FEV6-PRED: 80 %
FEV1FVC-PRE: 98 %
FEV1FVC-PRED: 80 %
FIFMAX-PRE: 4.11 L/SEC
FVC-%PRED-PRE: 49 %
FVC-PRE: 1.36 L
FVC-PRED: 2.76 L
GLUCOSE SERPL-MCNC: 103 MG/DL (ref 70–99)
HCO3 BLDV-SCNC: 31 MMOL/L (ref 21–28)
HCT VFR BLD AUTO: 32.7 % (ref 35–47)
HGB BLD-MCNC: 10.1 G/DL (ref 11.7–15.7)
MAGNESIUM SERPL-MCNC: 2.5 MG/DL (ref 1.7–2.3)
MCH RBC QN AUTO: 33.6 PG (ref 26.5–33)
MCHC RBC AUTO-ENTMCNC: 30.9 G/DL (ref 31.5–36.5)
MCV RBC AUTO: 109 FL (ref 78–100)
O2/TOTAL GAS SETTING VFR VENT: 21 %
OXYHGB MFR BLDV: 24 % (ref 70–75)
PCO2 BLDV: 74 MM HG (ref 40–50)
PH BLDV: 7.22 [PH] (ref 7.32–7.43)
PLATELET # BLD AUTO: 215 10E3/UL (ref 150–450)
PO2 BLDV: 20 MM HG (ref 25–47)
POTASSIUM SERPL-SCNC: 4.7 MMOL/L (ref 3.4–5.3)
PROT SERPL-MCNC: 6.6 G/DL (ref 6.4–8.3)
RBC # BLD AUTO: 3.01 10E6/UL (ref 3.8–5.2)
SAO2 % BLDV: 24.1 % (ref 70–75)
SODIUM SERPL-SCNC: 140 MMOL/L (ref 135–145)
TME LAST DOSE: NORMAL H
TME LAST DOSE: NORMAL H
WBC # BLD AUTO: 6 10E3/UL (ref 4–11)

## 2024-06-26 PROCEDURE — 94375 RESPIRATORY FLOW VOLUME LOOP: CPT | Performed by: PHYSICIAN ASSISTANT

## 2024-06-26 PROCEDURE — 86833 HLA CLASS II HIGH DEFIN QUAL: CPT | Performed by: INTERNAL MEDICINE

## 2024-06-26 PROCEDURE — 86832 HLA CLASS I HIGH DEFIN QUAL: CPT | Performed by: INTERNAL MEDICINE

## 2024-06-26 PROCEDURE — 85027 COMPLETE CBC AUTOMATED: CPT | Performed by: PATHOLOGY

## 2024-06-26 PROCEDURE — 80158 DRUG ASSAY CYCLOSPORINE: CPT | Performed by: PHYSICIAN ASSISTANT

## 2024-06-26 PROCEDURE — 87799 DETECT AGENT NOS DNA QUANT: CPT | Performed by: PHYSICIAN ASSISTANT

## 2024-06-26 PROCEDURE — 83735 ASSAY OF MAGNESIUM: CPT | Performed by: PATHOLOGY

## 2024-06-26 PROCEDURE — G0463 HOSPITAL OUTPT CLINIC VISIT: HCPCS | Performed by: PHYSICIAN ASSISTANT

## 2024-06-26 PROCEDURE — 71046 X-RAY EXAM CHEST 2 VIEWS: CPT | Performed by: STUDENT IN AN ORGANIZED HEALTH CARE EDUCATION/TRAINING PROGRAM

## 2024-06-26 PROCEDURE — 82805 BLOOD GASES W/O2 SATURATION: CPT | Performed by: PATHOLOGY

## 2024-06-26 PROCEDURE — 99000 SPECIMEN HANDLING OFFICE-LAB: CPT | Performed by: PATHOLOGY

## 2024-06-26 PROCEDURE — 99214 OFFICE O/P EST MOD 30 MIN: CPT | Mod: 25 | Performed by: PHYSICIAN ASSISTANT

## 2024-06-26 PROCEDURE — 80053 COMPREHEN METABOLIC PANEL: CPT | Performed by: PATHOLOGY

## 2024-06-26 RX ORDER — FLUTICASONE PROPIONATE 50 MCG
1 SPRAY, SUSPENSION (ML) NASAL PRN
Status: SHIPPED
Start: 2024-06-26 | End: 2024-09-18

## 2024-06-26 RX ORDER — AMINO AC/PROTEIN HYDR/WHEY PRO 10G-100/30
2 LIQUID (ML) ORAL DAILY
Status: SHIPPED
Start: 2024-06-26

## 2024-06-26 ASSESSMENT — PAIN SCALES - GENERAL: PAINLEVEL: NO PAIN (0)

## 2024-06-26 NOTE — PROGRESS NOTES
Transplant Coordinator Note    Reason for visit: Post lung transplant follow up visit   Coordinator: Present  Caregiver:  none    Health concerns addressed today:  1. Respiratory: no shortness of breath or cough  2. GI: occasional nausea. on tube feeds, 2-3 cartons a day. Normal BM's for the past 2 months. Using 1-2 imodium daily.    Activity/rehab: Up ad magalie  Oxygen needs: Room air, AVAPS at night  Pain management/RX: NA  Diabetic management: managed by PCP; checking twice per day  CMV status: D+/R+  Valcyte stopped: POD 8-90  EBV status: D+/R+  PJP prophylactic: Bactrim    COVID:  COVID-19 infection (yes/no, date of most recent positive test):   Status/instructions given about COVID-19 vaccine:     Pt Education: medications (use/dose/side effects), how/when to call coordinator, frequency of labs, s/s of infection/rejection, call prior to starting any new medications, lab/vital sign book    Health Maintenance:   Last colonoscopy:   Next colonoscopy due:   Dermatology:  Vaccinations this visit:     Labs, CXR, PFTs reviewed with patient  Medication record reviewed and reconciled  Questions and concerns addressed    Patient Instructions  1. Continue to hydrate with 60-70 oz fluids daily.  2. Continue to exercise daily or most days of the week.  3. Follow up with your primary care provider for annual gender health maintenance procedures.  4. Follow up with colonoscopy schedule.  5. Follow up with annual dermatology visits.  6. Try to use your AVAPS with naps and at night  7. We will have you see Cardiology  8. Follow up with your primary doctor about your thyroid and thyroid medications.  9. Increase your protein to 2 packets daily.  10. Please call to troubleshoot your AVAPS. Jcarlos: [phone]    Next transplant clinic appointment:  2 months with CXR, labs, full PFTs and 6MWT  Next lab draw: pending cyclosporine, otherwise 1 month    AVS printed at time of check out

## 2024-06-26 NOTE — PATIENT INSTRUCTIONS
Patient Instructions  1. Continue to hydrate with 60-70 oz fluids daily.  2. Continue to exercise daily or most days of the week.  3. Follow up with your primary care provider for annual gender health maintenance procedures.  4. Follow up with colonoscopy schedule.  5. Follow up with annual dermatology visits.  6. Try to use your AVAPS with naps and at night  7. We will have you see Cardiology  8. Follow up with your primary doctor about your thyroid and thyroid medications.  9. Increase your protein to 2 packets daily.  10. Please call to troubleshoot your AVAPS. Jcarlos: [phone]      Next transplant clinic appointment:  2 months with CXR, labs, full PFTs and 6MWT  Next lab draw: pending tacrolimus, otherwise 1 month    ~~~~~~~~~~~~~~~~~~~~~~~~~    Thoracic Transplant Office phone [phone] (alt [phone]), fax [phone]    Office Hours 8:30 - 5:00     For after-hours urgent issues, please dial [phone] (alt [phone]) and ask the  to page the Thoracic Transplant Coordinator On-Call.   --------------------  To expedite your medication refill(s), please contact your pharmacy and have them fax a refill request to: [phone]    *Please allow 3 business days for routine medication refills.  *Please allow 5 business days for controlled substance medication refills.    **For Diabetic medications and supplies refill(s), please contact your pharmacy and have them contact your Endocrine team.  --------------------  For scheduling appointments call [phone] (alt [phone])  --------------------  Please Note: If you are active on [employer], all future test results will be sent by [employer] message only, and will no longer be called to patient. You may also receive communication directly from your physician.

## 2024-06-26 NOTE — LETTER
6/26/2024      Sofie Rodriguez  [address] [place] [address]      Dear Colleague,    Thank you for referring your patient, Sofie Rodriguez, to the [facility] [place]. Please see a copy of my visit note below.    [facility]  June 26, 2024         Assessment and Plan:   Sofie Rodrigeuz is a 64 year old female with h/o COPD s/p BSLT 6/28/22 with course complicated by post-operative hemidiaphragm palsy, recurrent PNAs, positive DSA, EBV viremia, hypogammaglobulinemia, severe gastroparesis s/p G/J tube placement and pyloric botox (1/25/23), GI bleed 2/2 pyloric ulcer, hemobilia s/p ERCP and MRCP, chronic diarrhea, recurrent C diff colitis, ESRD on iHD, recurrent falls with injuries (recent right hip fx s/p ORIF 12/2023), deconditioning, and FTT.  Admitted 2/10-4/15 for failure to thrive and initiation of TPN/lipids. Emergent intubation 2/17-2/19 for hypoxic and hypercapneic respiratory failure and encephalopathy. Returned to ICU multiple times for tenuous respiratory status complicated by variable daytime NIPPV tolerance and hypervolemia with iHD limited d/t hypotension. Discharged to home NIPPV device. She continues to struggle with oral and TF intolerance and very low BMI. She was advised to follow up with Palliative re: goals of care which she declined.      1. Chronic hypoxic/hypercapneic respiratory failure:  S/p bilateral sequential lung transplant:   Suspected CARLEE: denies new pulmonary complaints today. States she is using her AVAPs 5-6 hours per night, but cannot tolerate it further secondary to dry mouth. Continues to not steve the device with naps. CO2 remains high (70s), but stable. Sating 98% on room air in clinic. CMV 5/14 negative and Prospera of 0.62 on 5/14. CXR reviewed today and demonstrates stable transplant. PFTs improved 200 ml from prior.  - Advisedpatient to follow up with her  [employer] [facility], Jcarlos at [facility] re: troubleshooting dry mouth  - Highly recommend wearing her AVAPs with all naps  - Continue IS including CSA (goal 120-14) and prednisone 5 mg daily  - AZA previously stopped 5/2023 d/t low ImmuKnow assay and EBV viremia  - Bactrim qMWF for PJP ppx     2. Positive DSA: [facility] treatment deferred given frailty and stable PFTs. DSA has been elevated for multiple months, most recently 6948 (DQB2) on 5/14. Most recent Prospera < 1.0 per above.    - DSA pending  - Continue to monitor Prospera, ordered for next visit    3. FTT:  Severe protein calorie malnutrition:  Gastroparesis s/p PEG/J, botox, and G-POEM:  SB hypomotility:  Pyloric ulcer:  Chronic nausea and osmotic diarrhea:  SIBO s/p rifaximin:   Recurrent C diff colitis: chronic osmotic and infectious diarrhea since transplant with recurrent episodes of C diff.  Notable weight loss (40# in a year) d/t diarrhea, GI dysmotility, and intolerance of enteral feeds (PEG/J tube in place), most recently on elemental formula. Extensive OP eval and f/u with GI. S/p port placement for TPN and lipids. Continued for ~5 weeks inpatient without considerable improvement, transitioned back to TF. Colonoscopy recommended by GI during last admission, but pt. deferred d/t risk for reintubation and improvement in diarrhea today. Today, patient weighs 84.8 lbs which is up from prior. States she is tolerating more TF and some oral intake. Stools are soft, but stable.  - Continue TF, increase protein to two packets daily  - Following with Dr. Navarro who suggested possible trial of prucalopride, which she has decided to hold off on taking    4. ESRD: no on dialysis T/Th/Sat. Was unable to tolerate the volume associated with TPN.     5. A-flutter (recurrent on 3/17)  A-fib, intermittent  HTN  HFpEF: intermittently throughout admission. Was started on amiodarone bolus with drip and transitioned to PO dosing with no end date. No current issues.  - Continue metoprolol,  "amiodarone and apixaban  - Needs EP follow up    6. Hypothyroidism: ICU team on 2/28 recommended initiation of treatment for possible hypothyroid as this can improve respiratory muscle function in the setting of weakness and malnutrition.   - Continue liothyronine + levothyroxine, needs follow up with PCP     RTC: 2 months  Vaccinations:   Preventative: DEXA > June 2025;     Kaylin Triana PA-C  Pulmonary, Allergy, Critical Care and Sleep Medicine        Interval History:     Breathing is good, no new shortness of breath, minimal congestion or tightness. No chest pain or palpitations, no allergy issues. Wearing her AVAPS 5-6 hours/night, cannot tolerate it more secondary a dry mouth. Using her AVAPs only at night for 5-6 hours at night, not with naps. Nausea is okay, continues to be the determining factor for tolerating her TF. BMs are \"good\" states it's been 2-3 months that BMs have been improved to 1-2 stools per day. Doing 2-3 cartons/day at 60 ml.           Review of Systems:   Please see HPI, otherwise the complete 10 point ROS is negative.           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     Hepatitis 2017    Hep C, [facility]     History of blood transfusion      HTN (hypertension)      Infectious mononucleosis      Lung infection 11/30/2022     Osteopenia      Past Surgical History:   Procedure Laterality Date     BRONCHOSCOPY (RIGID OR FLEXIBLE), DIAGNOSTIC N/A 08/02/2022    Procedure: BRONCHOSCOPY, DIAGNOSTIC- inspection Bronch;  Surgeon: Kamala Lovell MD;  Location:  GI     BRONCHOSCOPY (RIGID OR FLEXIBLE), DIAGNOSTIC N/A 09/13/2022    Procedure: INSPECTION BRONCHOSCOPY, WITH BRONCHOALVEOLAR LAVAGE;  Surgeon: Jose R Mccullough MD;  Location: " [facility]     BRONCHOSCOPY (RIGID OR FLEXIBLE), DIAGNOSTIC N/A 11/09/2022    Procedure: BRONCHOSCOPY, WITH BRONCHOALVEOLAR LAVAGE AND BIOPSY;  Surgeon: Cesar Lima MD;  [facility]     BRONCHOSCOPY (RIGID OR FLEXIBLE), DIAGNOSTIC N/A 01/25/2023    Procedure: BRONCHOSCOPY, WITH BRONCHOALVEOLAR LAVAGE AND BIOPSY;  Surgeon: Maosn Reddy MD;  [facility]     BRONCHOSCOPY (RIGID OR FLEXIBLE), DIAGNOSTIC N/A 04/19/2023    Procedure: BRONCHOSCOPY, WITH BRONCHOALVEOLAR LAVAGE AND BIOPSY;  Surgeon: Kamala Lovell MD;  [facility]     BRONCHOSCOPY (RIGID OR FLEXIBLE), DIAGNOSTIC N/A 07/12/2023    Procedure: BRONCHOSCOPY, WITH BRONCHOALVEOLAR LAVAGE AND BIOPSY;  Surgeon: Cesar Lima MD;  Location:  GI     BRONCHOSCOPY FLEXIBLE AND RIGID N/A 07/19/2022    Procedure: BRONCHOSCOPY inspection only;  Surgeon: Bob Liao MD;  Location:  GI     COLONOSCOPY  2015     CORONARY ANGIOGRAPHY ADULT ORDER       CV CORONARY ANGIOGRAM N/A 06/30/2021    Procedure: CV CORONARY ANGIOGRAM;  Surgeon: Alexander Cuellar MD;  Location:  HEART CARDIAC CATH LAB     CV RIGHT HEART CATH MEASUREMENTS RECORDED N/A 06/30/2021    Procedure: CV RIGHT HEART CATH;  Surgeon: Alexander Cuellar MD;  Location:  HEART CARDIAC CATH LAB     ENDOSCOPIC PERORAL MYOTOMY N/A 10/09/2023    Procedure: MYOTOMY, ESOPHAGUS, ENDOSCOPIC, ORAL APPROACH;  Surgeon: Gonzalez Navarro MD;  [facility]     ENDOSCOPIC RETROGRADE CHOLANGIOPANCREATOGRAM N/A 08/11/2022    Procedure: ENDOSCOPIC RETROGRADE CHOLANGIOPANCREATOGRAPHY WITH PANCREATIC DUCT NEEDLE KNIFE AND STENT PLACEMENT, BILE DUCT SPHINCTEROTOMY, BLOOD/DEBRIS REMOVAL AND STENT PLACEMENT;  Surgeon: Cosmo Arroyo MD;  [facility]     ENDOSCOPIC RETROGRADE CHOLANGIOPANCREATOGRAM N/A 10/07/2022    Procedure: ENDOSCOPIC RETROGRADE CHOLANGIOPANCREATOGRAPHY with biliary and pancreatic stent removal, debris removal;  Surgeon: Cosmo Arroyo MD;  Location:  OR      ENT SURGERY  1974    tonsillectomy     ENTEROSCOPY SMALL BOWEL N/A 08/11/2022    Procedure: SMALL BOWEL ENTEROSCOPY;  Surgeon: Cosmo Arroyo MD;  [facility]     ESOPHAGOGASTRODUODENOSCOPY, WITH NASOGASTRIC TUBE INSERTION N/A 07/01/2022    Procedure: ESOPHAGOGASTRODUODENOSCOPY, WITH NASOJEJUNAL TUBE INSERTION;  Surgeon: Ozzy Nickerson MD;  [facility]: [facility]     ESOPHAGOSCOPY, GASTROSCOPY, DUODENOSCOPY (EGD), COMBINED N/A 08/03/2022    Procedure: ESOPHAGOGASTRODUODENOSCOPY (EGD);  Surgeon: Ira Andres MD;  [facility]     ESOPHAGOSCOPY, GASTROSCOPY, DUODENOSCOPY (EGD), COMBINED N/A 01/25/2023    Procedure: ESOPHAGOGASTRODUODENOSCOPY (EGD) with botox injection;  Surgeon: Gonzalez Navarro MD;  [facility]     ESOPHAGOSCOPY, GASTROSCOPY, DUODENOSCOPY (EGD), COMBINED N/A 10/09/2023    Procedure: ESOPHAGOGASTRODUODENOSCOPY;  Surgeon: Gonzalez Navarro MD;  [facility]     HAND SURGERY       INSERT CHEST TUBE Right 09/13/2022    Procedure: Insert chest tube;  Surgeon: Jose R Mccullough MD;  [facility]: [facility]     IR CVC TUNNEL PLACEMENT > 5 YRS OF AGE  09/26/2022     IR CVC TUNNEL PLACEMENT > 5 YRS OF AGE  02/14/2024     IR CVC TUNNEL REMOVAL RIGHT  3/6/2024     IR GASTRO JEJUNOSTOMY TUBE CHANGE  08/31/2022     IR GASTRO JEJUNOSTOMY TUBE CHANGE  12/21/2022     IR GASTRO JEJUNOSTOMY TUBE CHANGE  07/12/2023     IR GASTRO JEJUNOSTOMY TUBE CHANGE  08/18/2023     IR GASTRO JEJUNOSTOMY TUBE CHANGE  11/14/2023     IR GASTRO JEJUNOSTOMY TUBE CHANGE  4/8/2024     IR GASTRO JEJUNOSTOMY TUBE PLACEMENT  07/27/2022     IR THORACENTESIS  08/29/2022     LEEP TX, CERVICAL  04/07/2017    HECTOR III     LYMPH NODE BIOPSY Left 2005    Left axilla, benign- [place]     MIDLINE INSERTION - DOUBLE LUMEN Left 07/28/2022    20cm, Basilic vein     PICC DOUBLE LUMEN PLACEMENT Right 03/04/2024    5FR DL PICc, basiliv vein- L-36cm, 1cm out.     REPLACE GASTROJEJUNOSTOMY TUBE, PERCUTANEOUS  10/07/2022     Procedure: Replace Gastrojejunostomy Tube;  Surgeon: Cosmo Arroyo MD;  [facility]     THORACENTESIS Left 08/29/2022    Procedure: THORACENTESIS;  Surgeon: Bo Capone PA-C;  Location: [facility] OR     THORACENTESIS Left 09/13/2022    Procedure: Thoracentesis;  Surgeon: Jose R Mccullough MD;  [facility]     THROMBECTOMY UPPER EXTREMITY Left 07/02/2022    Procedure: LEFT RADIAL ARM THROMBECTOMY;  Surgeon: Christie Graham MD;  [facility]     TRANSPLANT LUNG RECIPIENT SINGLE X2 Bilateral 06/28/2022    Procedure: Clamshell Incision, Bilateral Sequential Lung Transplant, On Cardiopulmonary Bypass, Flexible Bronchoscopy;  Surgeon: Sue Sunshine MD;  [facility]           Family History:     History reviewed. No pertinent family history.         Social History:     Social History     Socioeconomic History     Marital status:      Spouse name: Not on file     Number of children: Not on file     Years of education: Not on file     Highest education level: Not on file   Occupational History     Not on file   Tobacco Use     Smoking status: Former     Current packs/day: 0.00     Average packs/day: 0.5 packs/day for 30.0 years (15.0 ttl pk-yrs)     Types: Cigarettes     Start date: 11/11/1990     Quit date: 11/11/2020     Years since quitting: 3.6     Smokeless tobacco: Never   Substance and Sexual Activity     Alcohol use: Not Currently     Comment: Stopped drinking in 2020     Drug use: Not Currently     Types: Marijuana, Methamphetamines     Comment: hx:marijuana and methamphetamine-quit both unsure ?  2-3 yrs ago     Sexual activity: Not on file   Other Topics Concern     Parent/sibling w/ CABG, MI or angioplasty before 65F 55M? Not Asked   Social History Narrative     Not on file     Social Determinants of Health     Financial Resource Strain: Low Risk  (5/7/2024)    Received from [facility] and Count [facility] the [facility]    Overall Financial Resource Strain (CARDIA)       Difficulty of Paying Living Expenses: Not very hard   Food Insecurity: No Food Insecurity (5/7/2024)    [facility]    Hunger Vital Sign      Worried About Running Out of Food in the Last Year: Never true      Ran Out of Food in the Last Year: Never true   Transportation Needs: No Transportation Needs (5/7/2024)    Received from [facility]    Transportation Needs      In the past 12 months, has lack of transportation kept you from medical appointments, meetings, work, or from getting medicines or things needed for daily living?: No   Physical Activity: Sufficiently Active (5/7/2024)    Received from [facility]    Exercise Vital Sign      Days of Exercise per Week: 3 days      Minutes of Exercise per Session: 60 min   Recent Concern: Physical Activity - Inactive (4/17/2024)    Received from [facility]    Exercise Vital Sign      Days of Exercise per Week: 0 days      Minutes of Exercise per Session: 20 min   Stress: No Stress Concern Present (5/7/2024)    Received from [facility]    [employer] [place] [employer] - Occupational Stress Questionnaire      Feeling of Stress : Not at all   Social Connections: Moderately Isolated (5/7/2024)    Received from [facility]    Social Connection and Isolation Panel [NHANES]      Frequency of Communication with Friends and Family: More than three times a week      Frequency of Social Gatherings with Friends and Family: Twice a week      Attends Jew Services: 1 to 4 times per year      Active Member of Clubs or Organizations: No      Attends Club or Organization Meetings: Never      Marital Status:    Interpersonal Safety: Not At Risk (5/9/2024)    Received from [facility]    Intimate Partner Violence      Are you in a relationship where you are physically hurt, threatened and/or made to feel  afraid?: No   Housing Stability: Unknown (5/7/2024)    [facility]    Housing Stability Vital Sign      Unable to Pay for Housing in the Last Year: No      Number of Times Moved in the Last Year: Not on file      Homeless in the Last Year: Not on file            Medications:     Current Outpatient Medications   Medication Sig Dispense Refill     acetaminophen (TYLENOL) 500 MG tablet 500-1,000 mg by Per J Tube route 3 times daily as needed for mild pain       amiodarone (PACERONE) 200 MG tablet 1 tablet (200 mg) by Oral or Feeding Tube route daily 30 tablet 4     apixaban ANTICOAGULANT (ELIQUIS) 2.5 MG tablet Take 1 tablet (2.5 mg) by mouth 2 times daily 60 tablet 4     B Complex-C-Folic Acid (DIALYVITE) TABS 1 tablet by Per J Tube route every morning 30 tablet 11     Biotin 2500 MCG CHEW Take 2,500 mcg by mouth daily       calcium carbonate-vitamin D (CALTRATE) 600-10 MG-MCG per tablet 1 tablet by Per J Tube route 3 times daily (with meals) 90 tablet 0     Calcium Carbonate-Vitamin D 600-10 MG-MCG TABS 1 tablet by Per J Tube route 3 times daily 90 tablet 11     cloNIDine (CATAPRES) 0.1 MG tablet Take 1 tablet (0.1 mg) by mouth at bedtime 30 tablet 0     cycloSPORINE modified (GENERIC EQUIVALENT) 25 MG capsule Take 4 capsules (100 mg) by mouth every morning AND 4 capsules (100 mg) every evening. 240 capsule 11     fluticasone (FLONASE) 50 MCG/ACT nasal spray Spray 1 spray into both nostrils as needed for rhinitis or allergies       levothyroxine (SYNTHROID/LEVOTHROID) 25 MCG tablet Take 1 tablet (25 mcg) by mouth daily 30 tablet 4     Lidocaine (LIDOCARE) 4 % Patch Place 1 patch onto the skin every 24 hours To prevent lidocaine toxicity, patient should be patch free for 12 hrs daily. 30 patch 3     liothyronine (CYTOMEL) 5 MCG tablet Take 0.5 tablets (2.5 mcg) by mouth 2 times daily 15 tablet 0     loperamide (IMODIUM A-D) 2 MG tablet 1 tablet (2 mg) by Per J Tube route 4 times  "daily as needed for diarrhea       metoprolol tartrate (LOPRESSOR) 50 MG tablet 0.5 tablets (25 mg) by Per Feeding Tube route 2 times daily 60 tablet 11     midodrine (PROAMATINE) 10 MG tablet Take 1 tablet (10 mg) by mouth 2 times daily as needed (for map less than 65 or sbp less than 100 on HD days) 30 tablet 0     multivitamin (CENTRUM SILVER) tablet Take 1 tablet by mouth daily       predniSONE (DELTASONE) 5 MG tablet 1 tablet (5 mg) by Per J Tube route every morning 30 tablet 11     protein modular (PROSOURCE TF) LIQD 2 packets by Per Feeding Tube route daily       senna-docusate (SENOKOT-S/PERICOLACE) 8.6-50 MG tablet Take 1 tablet by mouth 2 times daily as needed for constipation 300 tablet 0     sevelamer carbonate, RENVELA, 0.8 GM PACK Packet Take 1 packet (0.8 g) by mouth 3 times daily (with meals) (Patient taking differently: Take 0.8 g by mouth 5 times daily)       sulfamethoxazole-trimethoprim (BACTRIM) 400-80 MG tablet Take 1 tablet by mouth three times a week 30 tablet 2     No current facility-administered medications for this visit.            Physical Exam:   BP (!) 144/77   Pulse 77   Ht 1.6 m (5' 3\")   Wt 38.5 kg (84 lb 12.8 oz)   SpO2 98%   BMI 15.02 kg/m      GENERAL: ill appearing, NAD  HEENT: NCAT, EOMI, no scleral icterus, oral mucosa moist and without lesions  Neck: no cervical or supraclavicular adenopathy  Lungs: moderate airflow, mainly clear  CV: RRR, S1S2, no murmurs noted  Abdomen: cachectic appearing, normoactive BS  Lymph: no edema  Neuro: AAO X 3, CN 2-12 grossly intact  Psychiatric: normal affect, good eye contact  Skin: no rash, jaundice or lesions on limited exam         Data:   All laboratory and imaging data reviewed.      Recent Results (from the past 168 hour(s))   Blood gas venous    Collection Time: 06/26/24 11:51 AM   Result Value Ref Range    pH Venous 7.22 (L) 7.32 - 7.43    pCO2 Venous 74 (H) 40 - 50 mm Hg    pO2 Venous 20 (L) 25 - 47 mm Hg    Bicarbonate Venous " 31 (H) 21 - 28 mmol/L    Base Excess/Deficit Venous 1.5 -3.0 - 3.0 mmol/L    FIO2 21     Oxyhemoglobin Venous 24 (L) 70 - 75 %    O2 Sat, Venous 24.1 (L) 70.0 - 75.0 %   CBC with platelets    Collection Time: 06/26/24 11:53 AM   Result Value Ref Range    WBC Count 6.0 4.0 - 11.0 10e3/uL    RBC Count 3.01 (L) 3.80 - 5.20 10e6/uL    Hemoglobin 10.1 (L) 11.7 - 15.7 g/dL    Hematocrit 32.7 (L) 35.0 - 47.0 %     (H) 78 - 100 fL    MCH 33.6 (H) 26.5 - 33.0 pg    MCHC 30.9 (L) 31.5 - 36.5 g/dL    RDW 17.2 (H) 10.0 - 15.0 %    Platelet Count 215 150 - 450 10e3/uL   CMV Quantitative, PCR (Blood)    Collection Time: 06/26/24 11:53 AM    Specimen: Arm, Right; Blood   Result Value Ref Range    CMV DNA IU/mL Not Detected Not Detected IU/mL   Comprehensive metabolic panel    Collection Time: 06/26/24 11:53 AM   Result Value Ref Range    Sodium 140 135 - 145 mmol/L    Potassium 4.7 3.4 - 5.3 mmol/L    Carbon Dioxide (CO2) 27 22 - 29 mmol/L    Anion Gap 14 7 - 15 mmol/L    Urea Nitrogen 48.7 (H) 8.0 - 23.0 mg/dL    Creatinine 6.61 (H) 0.51 - 0.95 mg/dL    GFR Estimate 7 (L) >60 mL/min/1.73m2    Calcium 9.5 8.8 - 10.2 mg/dL    Chloride 99 98 - 107 mmol/L    Glucose 103 (H) 70 - 99 mg/dL    Alkaline Phosphatase 70 40 - 150 U/L    AST 27 0 - 45 U/L    ALT 12 0 - 50 U/L    Protein Total 6.6 6.4 - 8.3 g/dL    Albumin 3.8 3.5 - 5.2 g/dL    Bilirubin Total 0.4 <=1.2 mg/dL   Magnesium    Collection Time: 06/26/24 11:53 AM   Result Value Ref Range    Magnesium 2.5 (H) 1.7 - 2.3 mg/dL   Cyclosporine by Tandem Mass Spectrometry    Collection Time: 06/26/24 11:53 AM   Result Value Ref Range    Cyclosporine 131 50 - 400 ug/L    Cyclosporine Last Dose Date 6/25/2024     Cyclosporine Last Dose Time 10:30 PM    General PFT Lab (Please always keep checked)    Collection Time: 06/26/24 12:03 PM   Result Value Ref Range    FVC-Pred 2.76 L    FVC-Pre 1.36 L    FVC-%Pred-Pre 49 %    FEV1-Pre 1.33 L    FEV1-%Pred-Pre 60 %    FEV1FVC-Pred 80 %     FEV1FVC-Pre 98 %    FEFMax-Pred 6.03 L/sec    FEFMax-Pre 5.13 L/sec    FEFMax-%Pred-Pre 85 %    FEF2575-Pred 2.03 L/sec    FEF2575-Pre 3.25 L/sec    ESP6680-%Pred-Pre 160 %    ExpTime-Pre 4.84 sec    FIFMax-Pre 4.11 L/sec    FEV1FEV6-Pred 80 %    FEV1FEV6-Pre 98 %     PFT interpretation:  Maneuver: valid and meets ATS guidelines  Moderate obstruction based on Z score  Compared to prior: FEV1 of 1.33 is 200 ml above prior  The decrease in FVC may represent air trapping v. restrictive physiology.  Lung volumes would be necessary to determine.    Transplant Coordinator Note    Reason for visit: Post lung transplant follow up visit   Coordinator: Present  Caregiver:  none    Health concerns addressed today:  1. Respiratory: no shortness of breath or cough  2. GI: occasional nausea. on tube feeds, 2-3 cartons a day. Normal BM's for the past 2 months. Using 1-2 imodium daily.    Activity/rehab: Up ad magalie  Oxygen needs: Room air, AVAPS at night  Pain management/RX: NA  Diabetic management: managed by PCP; checking twice per day  CMV status: D+/R+  Valcyte stopped: POD 8-90  EBV status: D+/R+  PJP prophylactic: Bactrim    COVID:  COVID-19 infection (yes/no, date of most recent positive test):   Status/instructions given about COVID-19 vaccine:     Pt Education: medications (use/dose/side effects), how/when to call coordinator, frequency of labs, s/s of infection/rejection, call prior to starting any new medications, lab/vital sign book    Health Maintenance:   Last colonoscopy:   Next colonoscopy due:   Dermatology:  Vaccinations this visit:     Labs, CXR, PFTs reviewed with patient  Medication record reviewed and reconciled  Questions and concerns addressed    Patient Instructions  1. Continue to hydrate with 60-70 oz fluids daily.  2. Continue to exercise daily or most days of the week.  3. Follow up with your primary care provider for annual gender health maintenance procedures.  4. Follow up with colonoscopy  schedule.  5. Follow up with annual dermatology visits.  6. Try to use your AVAPS with naps and at night  7. We will have you see Cardiology  8. Follow up with your primary doctor about your thyroid and thyroid medications.  9. Increase your protein to 2 packets daily.  10. Please call to troubleshoot your AVAPS. Jcarlos: [phone]    Next transplant clinic appointment:  2 months with CXR, labs, full PFTs and 6MWT  Next lab draw: pending cyclosporine, otherwise 1 month    AVS printed at time of check out         Again, thank you for allowing me to participate in the care of your patient.        Sincerely,        Kaylin Triana PA-C

## 2024-06-27 LAB — EBV DNA SERPL NAA+PROBE-ACNC: NOT DETECTED IU/ML

## 2024-06-27 NOTE — RESULT ENCOUNTER NOTE
CSA level 131 at 13.5 hours on 6/26/24  Goal 120-140  Level likely within goal at 12 hours, no change in dose

## 2024-06-28 ENCOUNTER — PATIENT OUTREACH (OUTPATIENT)
Dept: CARDIOLOGY | Facility: CLINIC | Age: 64
End: 2024-06-28

## 2024-06-28 NOTE — PROGRESS NOTES
Awaiting patient preference on mail out or in clinic placement for zio.         FW: EP follow up  Received: Today  Veronique Paulson APRN CNP Baldera, Denisis, JOSE; Beulah Lewis RN Hello,    Could you help get this pt follow-up with EP with zio patch completed prior? It appears she discharged in AFL. Unclear what current rhythm is.    Thanks,  LVW          Previous Messages       ----- Message -----  From: Patricia Jacobson RN  Sent: 6/27/2024   4:29 PM CDT  To: Girma Sharpe RN; *  Subject: EP follow up                                    Hi Veronique,    We saw Sofei in clinic yesterday. She was started on amiodarone during her admission in March and it has no end date. Would you be able to help set up a follow up EP visit for her?    Thank you,  Patricia

## 2024-07-04 LAB
DONOR IDENTIFICATION: NORMAL
DQB2: 5780
DSA COMMENTS: NORMAL
DSA PRESENT: YES
DSA TEST METHOD: NORMAL
ORGAN: NORMAL
SA 1  COMMENTS: NORMAL
SA 1 CELL: NORMAL
SA 1 TEST METHOD: NORMAL
SA 2 CELL: NORMAL
SA 2 COMMENTS: NORMAL
SA 2 TEST METHOD: NORMAL
SA1 HI RISK ABY: NORMAL
SA1 MOD RISK ABY: NORMAL
SA2 HI RISK ABY: NORMAL
SA2 MOD RISK ABY: NORMAL
UNACCEPTABLE ANTIGENS: NORMAL
UNOS CPRA: 37

## 2024-07-05 ENCOUNTER — DOCUMENTATION ONLY (OUTPATIENT)
Dept: TRANSPLANT | Facility: CLINIC | Age: 64
End: 2024-07-05
Payer: MEDICARE

## 2024-07-05 ASSESSMENT — ENCOUNTER SYMPTOMS: NEW SYMPTOMS OF CORONARY ARTERY DISEASE: 0

## 2024-07-07 DIAGNOSIS — Z94.2 S/P LUNG TRANSPLANT (H): Primary | ICD-10-CM

## 2024-07-16 NOTE — CONSULTS
Care Management Initial Consult    General Information  Assessment completed with: Patient, VM-chart review,         Primary Care Provider verified and updated as needed:     Readmission within the last 30 days:           Advance Care Planning:            Communication Assessment  Patient's communication style: spoken language (English or Bilingual)    Hearing Difficulty or Deaf: no   Wear Glasses or Blind: yes    Cognitive  Cognitive/Neuro/Behavioral: WDL                      Living Environment:   People in home: child(ray), adult     Current living Arrangements:  (currently in local apartment)      Able to return to prior arrangements:         Family/Social Support:  Care provided by: child(ray)  Provides care for: no one  Marital Status:   Children          Description of Support System: Supportive    Support Assessment: Adequate family and caregiver support    Current Resources:   Patient receiving home care services:       Community Resources:    Equipment currently used at home: shower chair, walker, rolling, other (see comments) (home oxygen)  Supplies currently used at home:      Employment/Financial:  Employment Status: disabled        Financial Concerns: No concerns identified           Lifestyle & Psychosocial Needs:  Social Determinants of Health     Tobacco Use: Medium Risk     Smoking Tobacco Use: Former Smoker     Smokeless Tobacco Use: Never Used   Alcohol Use: Unknown     Frequency of Alcohol Consumption: 2-4 times a month     Average Number of Drinks: Not asked     Frequency of Binge Drinking: Not asked   Financial Resource Strain: Not on file   Food Insecurity: Not on file   Transportation Needs: Not on file   Physical Activity: Not on file   Stress: Not on file   Social Connections: Not on file   Intimate Partner Violence: Not on file   Depression: Not at risk     PHQ-2 Score: 0   Housing Stability: Not on file       Functional Status:  Prior to admission patient needed assistance:  SARAH STEPHENSON received an in-basket message from  stating that pt was in need of a ride to her medical appointment on 7/24/24.     SARAH STEPHENSON called pt via  [phone]. SARAH STEPHENSON was able to speak to the pt and introduced self, role and reason for call. Pt expressed that she does need assistance with transportation to her appointment. Pt shared that she is unable to navigate the steps and she is on the second floor of her rooming house.     SARAH STEPHENSON completed chart review and noted that pt was  discharged using BLS. SARAH STEPHENSON also noted that [facility] will be assisting pt with virtual appointment for her cariology appointment. SARAH STEPHENSON discussed pt may need to switch her PCP appointment to a virtual appointment as the office typically offers a Lyft as a complimentary service.     SARAH STEPHENSON inquired if pt had a smart phone and pt expressed that she was not sure. SARAH STEPHENSON was able to ascertain that pt did have a smart phone. SARAH STEPHENSON discussed supports and pt reported that her landlord was offering her support in the home with care and providing food. SARAH STEPHENSON inquired about family and friends and pt reported that she was alone.     SARAH STEPHENSON inquired if pt had any source of income and pt reported that she has not been working since she got since and has no source of income. She reported that her landlord is allowing her to live out her deposit and then she has to pay her rent in August.     SARAH STEPHENSON inquired about residency and pt reported that [place] the [place] from [place] on [place], [place].     Pt expressed concerns about her health care. She reported that she did not feel comfortable with the virtual appointment and would rather meet with the provider in person. Pt does not have health insurance at this time.     SARAH STEPHENSON was made aware that pt does have medications from the hospital. SARAH STEPHENSON spoke to Practice Administrator and inquired if the would approve the BLS transport. SARAH STEPHENSON was later made aware that they approved an Ambulette transport    Dependent ADLs:: Ambulation-walker  Dependent IADLs:: Medication Management, Cooking, Laundry, Shopping, Meal Preparation, Transportation       Mental Health Status:  Mental Health Status: Past Concern       Chemical Dependency Status:                Values/Beliefs:  Spiritual, Cultural Beliefs, Jain Practices, Values that affect care: no               Additional Information:  Patient well known to me from follow up in the lung transplant program and previous hospitalizations.  Readmitted with increased work of breathing. Needing more oxygen.  In process of work up.  Met with patient at bedside to complete CMA and provide support and encouragement.  She states she was gaining strength at apartment, feeling better each day until she had this set back.  Has been well supported by adult children.      PLAN: will continue to follow for support, counseling, education and resources.       Mana Valdivia [facility]  Lung Transplant   Phone: [phone] Pager: [phone]         for pt. SARAH STEPHENSON informed pt that and ambulette had been approved. SARAH STEPHENSON encouraged pt to be ready an hour before her appointment time. Pt expressed understanding.     SARAH STEPHENSON also discussed calling [facility] for supports. SARAH STEPHENSON explained potential services that could be offered for supports in the home and pt was in agreement for SARAH STEPHENSON to call and request an assessment for services.     SARAH STEPHENSON will continue to be available for any additional needs as requested.

## 2024-08-21 ENCOUNTER — TELEPHONE (OUTPATIENT)
Dept: PHARMACY | Facility: CLINIC | Age: 64
End: 2024-08-21
Payer: MEDICARE

## 2024-08-21 NOTE — TELEPHONE ENCOUNTER
Clinical Pharmacy Consult:                                                      Transplant Specific: 24 Month Post Transplant Call   Date of Transplant: 6/28/2022  Type of Transplant: lung  First Transplant: yes  History of rejection: no    Immunosuppression Regimen   CSA 100mg qAM and 100mg qPM, Prednisone 5mg qAM   Patient specific goal: 120-140  Most recent level: 131, date 06/26/2024  Immunosuppressant Levels:  Therapeutic  Pt adherent to lab draws: yes  Scr:   Lab Results   Component Value Date    CR 3.68 11/23/2022    CR 0.85 06/30/2021     Side effects: none reported    Prophylactic Medications  Antibacterial:  Bactrim 3 times per week  Scheduled Discontinue Date: Lifelong    Antifungal: Nystatin  Scheduled Discontinue Date: therapy complete    Antiviral: CrCl 10 to 24 mL/minute: Valcyte 450 mg twice weekly   Scheduled Discontinue Date: Therapy Complete    Acid Reducer: Protonix (pantoprazole)  Scheduled Reviewed Date: Lifelong    Thrombosis Prevention: Not on  Scheduled Discontinue Date:  N/A    Blood Pressure Management  Frequency of home Blood Pressure checks: twice daily (AM and PM)  Most recent home BP: 112/70  Patient Blood pressure goal: <140/90  Patient blood pressure at goal:  yes  Hospitalizations/ER visits since last assessment: 2, broken hip and clotted fistula    Med rec/DUR performed: yes  Med Rec Discrepancies: yes, clonidine and liothyronine stopped    Spoke to Sofie today via phone. She reports doing well. She denies any side effects to her medications and was able to list off all of them with correct doses. The only discrepancies are listed above and patient is reporting they were stopped by the ordering provider. She utilizes phone alarms to remember to take her medications. Her last CSA level was at goal.    Sofie reports checking her blood pressure twice daily. At goal.    No other questions or concerns today.  Follow up in 1 year.    Time spent: 15 minutes    Milton Atkins  PharmD  [phone]

## 2024-09-04 ENCOUNTER — TELEPHONE (OUTPATIENT)
Dept: TRANSPLANT | Facility: CLINIC | Age: 64
End: 2024-09-04
Payer: MEDICARE

## 2024-09-04 NOTE — TELEPHONE ENCOUNTER
Per pharmacy, patient told them that she has been skipping her evening metoprolol dose on the days before she has dialysis, stating that she has been having low BPs at OP HD, occasionally needing midodrine during the HD run. Pt has noted to pharmacy that she skipped about 4 doses of metoprolol total.     Call out to patient to discuss, voicemail left requesting call back.

## 2024-09-05 ENCOUNTER — ENROLLMENT (OUTPATIENT)
Dept: HOME HEALTH SERVICES | Facility: HOME HEALTH | Age: 64
End: 2024-09-05
Payer: MEDICARE

## 2024-09-05 NOTE — TELEPHONE ENCOUNTER
Patient contacted. Patient confirmed that her BP last night was 105/60 and she held her evening dose of metoprolol. Pt's BP during HD run dropped to a systolic of 75, she did not recall the diastolic, required midodrine during the run. BP after arriving home from HD was 109/71. Confirmed metoprolol is ordered for 25 mg BID. Message sent to Kaylin to review, confirmed that nephrology should be managing as the hypotension is directly related to HD and fluid removal, patient updated, she will follow up w/Nephrology.

## 2024-09-10 DIAGNOSIS — Z94.2 LUNG TRANSPLANT STATUS, BILATERAL (H): ICD-10-CM

## 2024-09-10 RX ORDER — CYCLOSPORINE 100 MG/1
CAPSULE, LIQUID FILLED ORAL
Qty: 60 CAPSULE | Refills: 11 | Status: SHIPPED | OUTPATIENT
Start: 2024-09-10

## 2024-09-16 NOTE — PROGRESS NOTES
[facility]  September 18, 2024         Assessment and Plan:   Sofie Rodriguez is a 64 year old female with h/o COPD s/p BSLT 6/28/22 with course complicated by post-operative hemidiaphragm palsy, recurrent PNAs, positive DSA, EBV viremia, hypogammaglobulinemia, severe gastroparesis s/p G/J tube placement and pyloric botox, GI bleed 2/2 pyloric ulcer, hemobilia s/p ERCP and MRCP, chronic diarrhea, recurrent C diff colitis, ESRD on iHD, recurrent falls with injuries (recent right hip fx s/p ORIF 12/2023), deconditioning, FTT with prolonged admission 2/10-4/15 for failure to thrive and initiation of TPN/lipids, which was unsuccessful secondary to volume overload complicated by hypoxic and hypercapneic respiratory failure and encephalopathy now on NIPPV. This is a routine follow up.      1. Chronic hypoxic/hypercapneic respiratory failure:  S/p bilateral sequential lung transplant:   Suspected CARLEE: denies new pulmonary complaints today. States she is using her AVAPs 5 hours per night, but cannot tolerate it with naps or longer secondary to discomfort. Has not followed up with [facility]. Sating 95% on room air in clinic. Prospera of 0.62 on 5/14 and CMV 6/26 negative. CXR reviewed today and demonstrates stable transplant with . PFTs stable at her prior values.  - Advised patient again to follow up with her AVAPs Jcarlos GALVAN at [facility] re: troubleshooting   - Highly recommend wearing her AVAPs with all naps; will check VBG with next draw  - Continue IS including CSA (goal 120-14) and prednisone 5 mg daily  - AZA previously stopped 5/2023 d/t low ImmuKnow assay and EBV viremia; recheck immuknow  - Bactrim qMWF for PJP ppx  - Start pulmonary rehab     2. Positive DSA: [facility] treatment deferred given frailty and stable PFTs. DSA has been elevated for multiple months, 5780 on 6/26.   - DSA and Prospera pending    3. FTT:  Severe protein calorie malnutrition:  Gastroparesis s/p  PEG/J, botox, and G-POEM:  SB hypomotility:  Pyloric ulcer:  Chronic nausea and osmotic diarrhea:  SIBO s/p rifaximin:   Recurrent C diff colitis: chronic osmotic and infectious diarrhea since transplant with recurrent episodes of C diff.  Notable weight loss (40# in a year) d/t diarrhea, GI dysmotility, and intolerance of enteral feeds, most recently on elemental formula. Extensive OP eval and f/u with GI. S/p port placement for TPN and lipids. Continued for ~5 weeks inpatient without considerable improvement, transitioned back to TF. Colonoscopy recommended by GI during last admission, but pt. deferred d/t risk for reintubation and improvement in diarrhea today. Notes stable nausea today with improved stools, taking imodium once/day. States she is tolerating 4 cans of TF at 80 ml. Weight today up 3.5 lb to 88 lbs.   - Continue TF, will have dietician from home infusion touch base    4. ESRD: no on dialysis T/Th/Sat. Was unable to tolerate the volume associated with TPN.     5. A-flutter (recurrent on 3/17)  A-fib, intermittent  HTN  HFpEF: no current issues. Sofie is asking why she is still on amiodarone. Eliquis discontinued.   - Continue metoprolol, amiodarone   - Call and discuss medication with Cardiology    5. Elevated lipid panel: recommend discussion with her PCP    RTC: 3 months  Vaccinations: covid and flu in the next few weeks  Preventative: DEXA > June 2025; Derm is scheduled, states she is UTD with pap, needs to review mammogram; will discuss prior GI recommendations for colonoscopy at next visit    Kaylin Triana PA-C  Pulmonary, Allergy, Critical Care and Sleep Medicine        Interval History:     Hasn't been doing much the last three months, didn't follow up with pulmonary rehab but would like to do so. No recent illnesses, was admitted for a clot in her fistula. No allergies or cough, no shortness of breath. Using her AVAPS 5 hours/night, cannot tolerate it more, states it's hard to sleep. Has not  "been using it for naps. No other chest, no racing or fluttering heart. Nausea is \"mostly good\", doing her 4 cartons per day, running it at 80 ml. BMS are stable, regular at 1-2 times/day.           Review of Systems:   Please see HPI, otherwise the complete 10 point ROS is negative.           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    Hepatitis 2017    Hep C, [facility]    History of blood transfusion     HTN (hypertension)     Infectious mononucleosis     Lung infection 11/30/2022    Osteopenia      Past Surgical History:   Procedure Laterality Date    BRONCHOSCOPY (RIGID OR FLEXIBLE), DIAGNOSTIC N/A 08/02/2022    Procedure: BRONCHOSCOPY, DIAGNOSTIC- inspection Bronch;  Surgeon: Kamala Lovell MD;  Location: [facility]    BRONCHOSCOPY (RIGID OR FLEXIBLE), DIAGNOSTIC N/A 09/13/2022    Procedure: INSPECTION BRONCHOSCOPY, WITH BRONCHOALVEOLAR LAVAGE;  Surgeon: Jose R Mccullough MD;  Location: [facility]    BRONCHOSCOPY (RIGID OR FLEXIBLE), DIAGNOSTIC N/A 11/09/2022    Procedure: BRONCHOSCOPY, WITH BRONCHOALVEOLAR LAVAGE AND BIOPSY;  Surgeon: Cesar Lima MD;  Location: [facility]    BRONCHOSCOPY (RIGID OR FLEXIBLE), DIAGNOSTIC N/A 01/25/2023    Procedure: BRONCHOSCOPY, WITH BRONCHOALVEOLAR LAVAGE AND BIOPSY;  Surgeon: Mason Reddy MD;  Location: [facility]    BRONCHOSCOPY (RIGID OR FLEXIBLE), DIAGNOSTIC N/A 04/19/2023    Procedure: BRONCHOSCOPY, WITH BRONCHOALVEOLAR LAVAGE AND BIOPSY;  Surgeon: Kamala Lovell MD;  [facility]: [facility]    BRONCHOSCOPY (RIGID OR FLEXIBLE), DIAGNOSTIC N/A 07/12/2023    Procedure: BRONCHOSCOPY, WITH BRONCHOALVEOLAR LAVAGE AND BIOPSY;  Surgeon: Cesar Lima MD;  Location: [facility]    BRONCHOSCOPY FLEXIBLE AND RIGID N/A 07/19/2022    " Procedure: BRONCHOSCOPY inspection only;  Surgeon: Bob Liao MD;  Location:  GI    COLONOSCOPY  2015    CORONARY ANGIOGRAPHY ADULT ORDER      CV CORONARY ANGIOGRAM N/A 06/30/2021    Procedure: CV CORONARY ANGIOGRAM;  Surgeon: Alexander Cuellar MD;  Location:  HEART CARDIAC CATH LAB    CV RIGHT HEART CATH MEASUREMENTS RECORDED N/A 06/30/2021    Procedure: CV RIGHT HEART CATH;  Surgeon: Alexander Cuellar MD;  Location:  HEART [facility]ARD[facility] CATH LAB    ENDOSCOPIC PERORAL MYOTOMY N/A 10/09/2023    Procedure: MYOTOMY, ESOPHAGUS, ENDOSCOPIC, ORAL APPROACH;  Surgeon: Gonzalez Navarro MD;  [facility]    ENDOSCOPIC RETROGRADE CHOLANGIOPANCREATOGRAM N/A 08/11/2022    Procedure: ENDOSCOPIC RETROGRADE CHOLANGIOPANCREATOGRAPHY WITH PANCREATIC DUCT NEEDLE KNIFE AND STENT PLACEMENT, BILE DUCT SPHINCTEROTOMY, BLOOD/DEBRIS REMOVAL AND STENT PLACEMENT;  Surgeon: Cosmo Arroyo MD;  [facility]    ENDOSCOPIC RETROGRADE CHOLANGIOPANCREATOGRAM N/A 10/07/2022    Procedure: ENDOSCOPIC RETROGRADE CHOLANGIOPANCREATOGRAPHY with biliary and pancreatic stent removal, debris removal;  Surgeon: Cosmo Arroyo MD;  [facility]: [facility]    ENT SURGERY  1974    tonsillectomy    ENTEROSCOPY SMALL BOWEL N/A 08/11/2022    Procedure: SMALL BOWEL ENTEROSCOPY;  Surgeon: Cosmo Arroyo MD;  [facility]    ESOPHAGOGASTRODUODENOSCOPY, WITH NASOGASTRIC TUBE INSERTION N/A 07/01/2022    Procedure: ESOPHAGOGASTRODUODENOSCOPY, WITH NASOJEJUNAL TUBE INSERTION;  Surgeon: Ozzy Nickerson MD;  Location:  GI    ESOPHAGOSCOPY, GASTROSCOPY, DUODENOSCOPY (EGD), COMBINED N/A 08/03/2022    Procedure: ESOPHAGOGASTRODUODENOSCOPY (EGD);  Surgeon: Ira Anders MD;  Location:  GI    ESOPHAGOSCOPY, GASTROSCOPY, DUODENOSCOPY (EGD), COMBINED N/A 01/25/2023    Procedure: ESOPHAGOGASTRODUODENOSCOPY (EGD) with botox injection;  Surgeon: Gonzalez Navarro MD;  Location:  GI    ESOPHAGOSCOPY, GASTROSCOPY, DUODENOSCOPY  (EGD), COMBINED N/A 10/09/2023    Procedure: ESOPHAGOGASTRODUODENOSCOPY;  Surgeon: Gonzalez Navarro MD;  [facility]    HAND SURGERY      INSERT CHEST TUBE Right 09/13/2022    Procedure: Insert chest tube;  Surgeon: Jose R Mccullough MD;  Location: [facility]    IR CVC TUNNEL PLACEMENT > 5 YRS OF AGE  09/26/2022    IR CVC TUNNEL PLACEMENT > 5 YRS OF AGE  02/14/2024    IR CVC TUNNEL REMOVAL RIGHT  3/6/2024    IR GASTRO JEJUNOSTOMY TUBE CHANGE  08/31/2022    IR GASTRO JEJUNOSTOMY TUBE CHANGE  12/21/2022    IR GASTRO JEJUNOSTOMY TUBE CHANGE  07/12/2023    IR GASTRO JEJUNOSTOMY TUBE CHANGE  08/18/2023    IR GASTRO JEJUNOSTOMY TUBE CHANGE  11/14/2023    IR GASTRO JEJUNOSTOMY TUBE CHANGE  4/8/2024    IR GASTRO JEJUNOSTOMY TUBE PLACEMENT  07/27/2022    IR THORACENTESIS  08/29/2022    LEEP TX, CERVICAL  04/07/2017    HECTOR III    LYMPH NODE BIOPSY Left 2005    Left axilla, benign- [place]    MIDLINE INSERTION - DOUBLE LUMEN Left 07/28/2022    20cm, Basilic vein    PICC DOUBLE LUMEN PLACEMENT Right 03/04/2024    5FR DL PICc, basiliv vein- L-36cm, 1cm out.    REPLACE GASTROJEJUNOSTOMY TUBE, PERCUTANEOUS  10/07/2022    Procedure: Replace Gastrojejunostomy Tube;  Surgeon: Cosmo Arroyo MD;  [facility]    THORACENTESIS Left 08/29/2022    Procedure: THORACENTESIS;  Surgeon: Bo Capone PA-C;  Location: [facility] OR    THORACENTESIS Left 09/13/2022    Procedure: Thoracentesis;  Surgeon: Jose R Mccullough MD;  [facility]: [facility]    THROMBECTOMY UPPER EXTREMITY Left 07/02/2022    Procedure: LEFT RADIAL ARM THROMBECTOMY;  Surgeon: Christie Graham MD;  [facility]    TRANSPLANT LUNG RECIPIENT SINGLE X2 Bilateral 06/28/2022    Procedure: Clamshell Incision, Bilateral Sequential Lung Transplant, On Cardiopulmonary Bypass, Flexible Bronchoscopy;  Surgeon: Sue Sunshine MD;  [facility]           Family History:     No family history on file.         Social History:     Social History     Socioeconomic  History    Marital status:      Spouse name: Not on file    Number of children: Not on file    Years of education: Not on file    Highest education level: Not on file   Occupational History    Not on file   Tobacco Use    Smoking status: Former     Current packs/day: 0.00     Average packs/day: 0.5 packs/day for 30.0 years (15.0 ttl pk-yrs)     Types: Cigarettes     Start date: 11/11/1990     Quit date: 11/11/2020     Years since quitting: 3.8    Smokeless tobacco: Never   Substance and Sexual Activity    Alcohol use: Not Currently     Comment: Stopped drinking in 2020    Drug use: Not Currently     Types: Marijuana, Methamphetamines     Comment: hx:marijuana and methamphetamine-quit both unsure ?  2-3 yrs ago    Sexual activity: Not on file   Other Topics Concern    Parent/sibling w/ CABG, MI or angioplasty before 65F 55M? Not Asked   Social History Narrative    Not on file     Social Determinants of Health     Financial Resource Strain: Low Risk  (7/10/2024)    [facility]    Overall Financial Resource Strain (CARDIA)     Difficulty of Paying Living Expenses: Not very hard   Food Insecurity: No Food Insecurity (8/10/2024)    [facility]    Hunger Vital Sign     Worried About Running Out of Food in the Last Year: Never true     Ran Out of Food in the Last Year: Never true   Transportation Needs: No Transportation Needs (8/10/2024)    [facility]    Transportation Needs     In the past 12 months, has lack of transportation kept you from medical appointments, meetings, work, or from getting medicines or things needed for daily living?: No   Physical Activity: Insufficiently Active (7/10/2024)    [facility]    Exercise Vital Sign     Days of Exercise per Week: 5 days     Minutes of Exercise per Session: 20 min   Stress: No Stress Concern Present (7/10/2024)    Received  from [email or web address] [facility]    [employer] [place] [employer] - Occupational Stress Questionnaire     Feeling of Stress : Not at all   Social Connections: Moderately Isolated (7/10/2024)    Received from [facility] [email or web address] [facility]    Social Connection and Isolation Panel [NHANES]     Frequency of Communication with Friends and Family: More than three times a week     Frequency of Social Gatherings with Friends and Family: Three times a week     Attends Mandaeism Services: 1 to 4 times per year     Active Member of Clubs or Organizations: No     Attends Club or Organization Meetings: Never     Marital Status:    Interpersonal Safety: Not At Risk (8/15/2024)    Received from [email or web address] [facility]    Intimate Partner Violence     Are you in a relationship where you are physically hurt, threatened and/or made to feel afraid?: No   Housing Stability: Low Risk  (8/10/2024)    Received from [facility] [email or web address] [facility]    Housing Stability Vital Sign     Unable to Pay for Housing in the Last Year: No     Number of Times Moved in the Last Year: 0     Homeless in the Last Year: No            Medications:     Current Outpatient Medications   Medication Sig Dispense Refill    lidocaine (LMX4) 4 % external cream Apply topically once as needed for mild pain.      acetaminophen (TYLENOL) 500 MG tablet 500-1,000 mg by Per J Tube route 3 times daily as needed for mild pain      amiodarone (PACERONE) 200 MG tablet 1 tablet (200 mg) by Oral or Feeding Tube route daily 30 tablet 4    B Complex-C-Folic Acid (DIALYVITE) TABS 1 tablet by Per J Tube route every morning 30 tablet 11    Biotin 2500 MCG CHEW Take 2,500 mcg by mouth daily      Calcium Carbonate-Vitamin D 600-10 MG-MCG TABS 1 tablet by Per J Tube route 3 times daily 90 tablet 11    cycloSPORINE modified (GENERIC EQUIVALENT) 100 MG capsule Take 1 capsule (100 mg) by mouth every morning AND 1 capsule (100 mg) every evening. 60  "capsule 11    levothyroxine (SYNTHROID/LEVOTHROID) 25 MCG tablet Take 1 tablet (25 mcg) by mouth daily 30 tablet 4    Lidocaine (LIDOCARE) 4 % Patch Place 1 patch onto the skin every 24 hours To prevent lidocaine toxicity, patient should be patch free for 12 hrs daily. 30 patch 3    loperamide (IMODIUM A-D) 2 MG tablet 1 tablet (2 mg) by Per J Tube route 4 times daily as needed for diarrhea      metoprolol tartrate (LOPRESSOR) 50 MG tablet 0.5 tablets (25 mg) by Per Feeding Tube route 2 times daily 60 tablet 11    midodrine (PROAMATINE) 10 MG tablet Take 1 tablet (10 mg) by mouth 2 times daily as needed (for map less than 65 or sbp less than 100 on HD days) 30 tablet 0    multivitamin (CENTRUM SILVER) tablet Take 1 tablet by mouth daily      predniSONE (DELTASONE) 5 MG tablet 1 tablet (5 mg) by Per J Tube route every morning 30 tablet 11    protein modular (PROSOURCE TF) LIQD 2 packets by Per Feeding Tube route daily      sevelamer carbonate, RENVELA, 0.8 GM PACK Packet Take 1 packet (0.8 g) by mouth 3 times daily (with meals) (Patient taking differently: Take 0.8 g by mouth 5 times daily)      sulfamethoxazole-trimethoprim (BACTRIM) 400-80 MG tablet Take 1 tablet by mouth three times a week 30 tablet 2     No current facility-administered medications for this visit.            Physical Exam:   /69   Pulse 74   Ht 1.6 m (5' 3\")   Wt 39.9 kg (88 lb)   SpO2 95%   BMI 15.59 kg/m      GENERAL: ill appearing, NAD  HEENT: NCAT, EOMI, no scleral icterus, oral mucosa moist and without lesions  Neck: no cervical or supraclavicular adenopathy  Lungs: moderate airflow, mainly clear  CV: RRR, S1S2, no murmurs noted  Abdomen: cachectic appearing, normoactive BS  Lymph: no edema  Neuro: AAO X 3, CN 2-12 grossly intact  Psychiatric: normal affect, good eye contact  Skin: no rash, jaundice or lesions on limited exam         Data:   All laboratory and imaging data reviewed.      Recent Results (from the past 168 hour(s)) "   6 minute walk test    Collection Time: 09/18/24 12:00 AM   Result Value Ref Range    6 min walk (FT) 1,235 1,462 ft    6 Min Walk (M) 376 446 m   Magnesium    Collection Time: 09/18/24 10:58 AM   Result Value Ref Range    Magnesium 2.7 (H) 1.7 - 2.3 mg/dL   Phosphorus    Collection Time: 09/18/24 10:58 AM   Result Value Ref Range    Phosphorus 6.6 (H) 2.5 - 4.5 mg/dL   Comprehensive metabolic panel    Collection Time: 09/18/24 10:58 AM   Result Value Ref Range    Sodium 135 135 - 145 mmol/L    Potassium 5.4 (H) 3.4 - 5.3 mmol/L    Carbon Dioxide (CO2) 26 22 - 29 mmol/L    Anion Gap 16 (H) 7 - 15 mmol/L    Urea Nitrogen 81.5 (H) 8.0 - 23.0 mg/dL    Creatinine 5.49 (H) 0.51 - 0.95 mg/dL    GFR Estimate 8 (L) >60 mL/min/1.73m2    Calcium 9.6 8.8 - 10.4 mg/dL    Chloride 93 (L) 98 - 107 mmol/L    Glucose 92 70 - 99 mg/dL    Alkaline Phosphatase 82 40 - 150 U/L    AST 36 0 - 45 U/L    ALT 23 0 - 50 U/L    Protein Total 7.2 6.4 - 8.3 g/dL    Albumin 4.1 3.5 - 5.2 g/dL    Bilirubin Total 0.3 <=1.2 mg/dL    Patient Fasting > 8hrs? Unknown    Lipid Profile    Collection Time: 09/18/24 10:58 AM   Result Value Ref Range    Cholesterol 236 (H) <200 mg/dL    Triglycerides 223 (H) <150 mg/dL    Direct Measure HDL 68 >=50 mg/dL    LDL Cholesterol Calculated 123 (H) <100 mg/dL    Non HDL Cholesterol 168 (H) <130 mg/dL    Patient Fasting > 8hrs? Unknown    INR    Collection Time: 09/18/24 10:58 AM   Result Value Ref Range    INR 0.88 0.85 - 1.15   CBC with platelets and differential    Collection Time: 09/18/24 10:58 AM   Result Value Ref Range    WBC Count 8.2 4.0 - 11.0 10e3/uL    RBC Count 2.98 (L) 3.80 - 5.20 10e6/uL    Hemoglobin 10.6 (L) 11.7 - 15.7 g/dL    Hematocrit 33.4 (L) 35.0 - 47.0 %     (H) 78 - 100 fL    MCH 35.6 (H) 26.5 - 33.0 pg    MCHC 31.7 31.5 - 36.5 g/dL    RDW 13.7 10.0 - 15.0 %    Platelet Count 232 150 - 450 10e3/uL    % Neutrophils 72 %    % Lymphocytes 11 %    % Monocytes 9 %    % Eosinophils 3 %     % Basophils 1 %    % Immature Granulocytes 3 %    NRBCs per 100 WBC 0 <1 /100    Absolute Neutrophils 5.9 1.6 - 8.3 10e3/uL    Absolute Lymphocytes 0.9 0.8 - 5.3 10e3/uL    Absolute Monocytes 0.8 0.0 - 1.3 10e3/uL    Absolute Eosinophils 0.3 0.0 - 0.7 10e3/uL    Absolute Basophils 0.1 0.0 - 0.2 10e3/uL    Absolute Immature Granulocytes 0.3 <=0.4 10e3/uL    Absolute NRBCs 0.0 10e3/uL   Hemoglobin A1c    Collection Time: 09/18/24 11:22 AM   Result Value Ref Range    Estimated Average Glucose 97 <117 mg/dL    Hemoglobin A1C 5.0 <5.7 %   General PFT Lab (Please always keep checked)    Collection Time: 09/18/24 11:45 AM   Result Value Ref Range    FVC-Pred 2.75 L    FVC-Pre 1.37 L    FVC-%Pred-Pre 49 %    FEV1-Pre 1.29 L    FEV1-%Pred-Pre 58 %    FEV1FVC-Pred 80 %    FEV1FVC-Pre 95 %    FEFMax-Pred 6.01 L/sec    FEFMax-Pre 4.75 L/sec    FEFMax-%Pred-Pre 78 %    FEF2575-Pred 2.02 L/sec    FEF2575-Pre 2.81 L/sec    VQW7689-%Pred-Pre 139 %    ExpTime-Pre 6.13 sec    FIFMax-Pre 3.99 L/sec    VC-Pred 3.10 L    VC-Pre 1.36 L    VC-%Pred-Pre 43 %    IC-Pred 2.06 L    IC-Pre 0.95 L    IC-%Pred-Pre 46 %    ERV-Pred 1.03 L    ERV-Pre 0.41 L    ERV-%Pred-Pre 39 %    FEV1FEV6-Pred 80 %    FEV1FEV6-Pre 94 %    FRCPleth-Pred 2.65 L    FRCPleth-Pre 2.79 L    FRCPleth-%Pred-Pre 105 %    RVPleth-Pred 1.89 L    RVPleth-Pre 2.38 L    RVPleth-%Pred-Pre 125 %    TLCPleth-Pred 4.77 L    TLCPleth-Pre 3.74 L    TLCPleth-%Pred-Pre 78 %    DLCOunc-Pred 19.08 ml/min/mmHg    DLCOunc-Pre 14.22 ml/min/mmHg    DLCOunc-%Pred-Pre 74 %    VA-Pre 2.49 L    VA-%Pred-Pre 55 %    FEV1SVC-Pred 71 %    FEV1SVC-Pre 95 %     PFT interpretation:  Maneuver: valid and meets ATS guidelines  Moderate obstruction based on Z score  Compared to prior: FEV1 of 1.29 is 40 ml below prior  The decrease in FVC may represent air trapping v. restrictive physiology.  Lung volumes would be necessary to determine.    6 MWT today on RA < LLN without significant desaturation or  hypoxia

## 2024-09-18 ENCOUNTER — OFFICE VISIT (OUTPATIENT)
Dept: PULMONOLOGY | Facility: CLINIC | Age: 64
End: 2024-09-18
Attending: PHYSICIAN ASSISTANT
Payer: MEDICARE

## 2024-09-18 ENCOUNTER — ANCILLARY PROCEDURE (OUTPATIENT)
Dept: GENERAL RADIOLOGY | Facility: CLINIC | Age: 64
End: 2024-09-18
Attending: PHYSICIAN ASSISTANT
Payer: MEDICARE

## 2024-09-18 ENCOUNTER — LAB (OUTPATIENT)
Dept: LAB | Facility: CLINIC | Age: 64
End: 2024-09-18
Attending: PHYSICIAN ASSISTANT
Payer: MEDICARE

## 2024-09-18 VITALS
DIASTOLIC BLOOD PRESSURE: 69 MMHG | HEART RATE: 74 BPM | SYSTOLIC BLOOD PRESSURE: 121 MMHG | OXYGEN SATURATION: 95 % | HEIGHT: 63 IN | WEIGHT: 88 LBS | BODY MASS INDEX: 15.59 KG/M2

## 2024-09-18 DIAGNOSIS — Z94.2 S/P LUNG TRANSPLANT (H): ICD-10-CM

## 2024-09-18 DIAGNOSIS — R79.9 ABNORMAL FINDING OF BLOOD CHEMISTRY, UNSPECIFIED: ICD-10-CM

## 2024-09-18 DIAGNOSIS — Z79.52 CURRENT CHRONIC USE OF SYSTEMIC STEROIDS: ICD-10-CM

## 2024-09-18 DIAGNOSIS — Z94.2 LUNG TRANSPLANT STATUS, BILATERAL (H): ICD-10-CM

## 2024-09-18 DIAGNOSIS — Z94.2 LUNG REPLACED BY TRANSPLANT (H): Primary | ICD-10-CM

## 2024-09-18 DIAGNOSIS — Z94.2 LUNG REPLACED BY TRANSPLANT (H): ICD-10-CM

## 2024-09-18 LAB
6 MIN WALK (FT): 1235 FT
6 MIN WALK (M): 376 M
ALBUMIN SERPL BCG-MCNC: 4.1 G/DL (ref 3.5–5.2)
ALP SERPL-CCNC: 82 U/L (ref 40–150)
ALT SERPL W P-5'-P-CCNC: 23 U/L (ref 0–50)
ANION GAP SERPL CALCULATED.3IONS-SCNC: 16 MMOL/L (ref 7–15)
AST SERPL W P-5'-P-CCNC: 36 U/L (ref 0–45)
BASOPHILS # BLD AUTO: 0.1 10E3/UL (ref 0–0.2)
BASOPHILS NFR BLD AUTO: 1 %
BILIRUB SERPL-MCNC: 0.3 MG/DL
BUN SERPL-MCNC: 81.5 MG/DL (ref 8–23)
CALCIUM SERPL-MCNC: 9.6 MG/DL (ref 8.8–10.4)
CHLORIDE SERPL-SCNC: 93 MMOL/L (ref 98–107)
CHOLEST SERPL-MCNC: 236 MG/DL
CREAT SERPL-MCNC: 5.49 MG/DL (ref 0.51–0.95)
CYCLOSPORINE BLD LC/MS/MS-MCNC: 123 UG/L (ref 50–400)
DLCOUNC-%PRED-PRE: 74 %
DLCOUNC-PRE: 14.22 ML/MIN/MMHG
DLCOUNC-PRED: 19.08 ML/MIN/MMHG
EGFRCR SERPLBLD CKD-EPI 2021: 8 ML/MIN/1.73M2
EOSINOPHIL # BLD AUTO: 0.3 10E3/UL (ref 0–0.7)
EOSINOPHIL NFR BLD AUTO: 3 %
ERV-%PRED-PRE: 39 %
ERV-PRE: 0.41 L
ERV-PRED: 1.03 L
ERYTHROCYTE [DISTWIDTH] IN BLOOD BY AUTOMATED COUNT: 13.7 % (ref 10–15)
EST. AVERAGE GLUCOSE BLD GHB EST-MCNC: 97 MG/DL
EXPTIME-PRE: 6.13 SEC
FASTING STATUS PATIENT QL REPORTED: ABNORMAL
FASTING STATUS PATIENT QL REPORTED: ABNORMAL
FEF2575-%PRED-PRE: 139 %
FEF2575-PRE: 2.81 L/SEC
FEF2575-PRED: 2.02 L/SEC
FEFMAX-%PRED-PRE: 78 %
FEFMAX-PRE: 4.75 L/SEC
FEFMAX-PRED: 6.01 L/SEC
FEV1-%PRED-PRE: 58 %
FEV1-PRE: 1.29 L
FEV1FEV6-PRE: 94 %
FEV1FEV6-PRED: 80 %
FEV1FVC-PRE: 95 %
FEV1FVC-PRED: 80 %
FEV1SVC-PRE: 95 %
FEV1SVC-PRED: 71 %
FIFMAX-PRE: 3.99 L/SEC
FRCPLETH-%PRED-PRE: 105 %
FRCPLETH-PRE: 2.79 L
FRCPLETH-PRED: 2.65 L
FVC-%PRED-PRE: 49 %
FVC-PRE: 1.37 L
FVC-PRED: 2.75 L
GLUCOSE SERPL-MCNC: 92 MG/DL (ref 70–99)
HBA1C MFR BLD: 5 %
HCO3 SERPL-SCNC: 26 MMOL/L (ref 22–29)
HCT VFR BLD AUTO: 33.4 % (ref 35–47)
HDLC SERPL-MCNC: 68 MG/DL
HGB BLD-MCNC: 10.6 G/DL (ref 11.7–15.7)
IC-%PRED-PRE: 46 %
IC-PRE: 0.95 L
IC-PRED: 2.06 L
IMM GRANULOCYTES # BLD: 0.3 10E3/UL
IMM GRANULOCYTES NFR BLD: 3 %
INR PPP: 0.88 (ref 0.85–1.15)
LDLC SERPL CALC-MCNC: 123 MG/DL
LYMPHOCYTES # BLD AUTO: 0.9 10E3/UL (ref 0.8–5.3)
LYMPHOCYTES NFR BLD AUTO: 11 %
MAGNESIUM SERPL-MCNC: 2.7 MG/DL (ref 1.7–2.3)
MCH RBC QN AUTO: 35.6 PG (ref 26.5–33)
MCHC RBC AUTO-ENTMCNC: 31.7 G/DL (ref 31.5–36.5)
MCV RBC AUTO: 112 FL (ref 78–100)
MONOCYTES # BLD AUTO: 0.8 10E3/UL (ref 0–1.3)
MONOCYTES NFR BLD AUTO: 9 %
NEUTROPHILS # BLD AUTO: 5.9 10E3/UL (ref 1.6–8.3)
NEUTROPHILS NFR BLD AUTO: 72 %
NONHDLC SERPL-MCNC: 168 MG/DL
NRBC # BLD AUTO: 0 10E3/UL
NRBC BLD AUTO-RTO: 0 /100
PHOSPHATE SERPL-MCNC: 6.6 MG/DL (ref 2.5–4.5)
PLATELET # BLD AUTO: 232 10E3/UL (ref 150–450)
POTASSIUM SERPL-SCNC: 5.4 MMOL/L (ref 3.4–5.3)
PROT SERPL-MCNC: 7.2 G/DL (ref 6.4–8.3)
RBC # BLD AUTO: 2.98 10E6/UL (ref 3.8–5.2)
RVPLETH-%PRED-PRE: 125 %
RVPLETH-PRE: 2.38 L
RVPLETH-PRED: 1.89 L
SODIUM SERPL-SCNC: 135 MMOL/L (ref 135–145)
TACROLIMUS BLD-MCNC: <1 UG/L (ref 5–15)
TLCPLETH-%PRED-PRE: 78 %
TLCPLETH-PRE: 3.74 L
TLCPLETH-PRED: 4.77 L
TME LAST DOSE: ABNORMAL H
TME LAST DOSE: ABNORMAL H
TME LAST DOSE: NORMAL H
TME LAST DOSE: NORMAL H
TRIGL SERPL-MCNC: 223 MG/DL
VA-%PRED-PRE: 55 %
VA-PRE: 2.49 L
VC-%PRED-PRE: 43 %
VC-PRE: 1.36 L
VC-PRED: 3.1 L
VIT D+METAB SERPL-MCNC: 67 NG/ML (ref 20–50)
WBC # BLD AUTO: 8.2 10E3/UL (ref 4–11)

## 2024-09-18 PROCEDURE — 99207 PR NO CHARGE LOS: CPT

## 2024-09-18 PROCEDURE — 83735 ASSAY OF MAGNESIUM: CPT | Performed by: PATHOLOGY

## 2024-09-18 PROCEDURE — 94729 DIFFUSING CAPACITY: CPT | Performed by: PHYSICIAN ASSISTANT

## 2024-09-18 PROCEDURE — 86832 HLA CLASS I HIGH DEFIN QUAL: CPT | Performed by: PHYSICIAN ASSISTANT

## 2024-09-18 PROCEDURE — 71046 X-RAY EXAM CHEST 2 VIEWS: CPT | Mod: GC | Performed by: STUDENT IN AN ORGANIZED HEALTH CARE EDUCATION/TRAINING PROGRAM

## 2024-09-18 PROCEDURE — 80061 LIPID PANEL: CPT | Performed by: PATHOLOGY

## 2024-09-18 PROCEDURE — 82306 VITAMIN D 25 HYDROXY: CPT | Performed by: PHYSICIAN ASSISTANT

## 2024-09-18 PROCEDURE — 94150 VITAL CAPACITY TEST: CPT | Performed by: PHYSICIAN ASSISTANT

## 2024-09-18 PROCEDURE — G0463 HOSPITAL OUTPT CLINIC VISIT: HCPCS | Performed by: PHYSICIAN ASSISTANT

## 2024-09-18 PROCEDURE — 80158 DRUG ASSAY CYCLOSPORINE: CPT | Performed by: PHYSICIAN ASSISTANT

## 2024-09-18 PROCEDURE — 85610 PROTHROMBIN TIME: CPT | Performed by: PATHOLOGY

## 2024-09-18 PROCEDURE — 80053 COMPREHEN METABOLIC PANEL: CPT | Performed by: PATHOLOGY

## 2024-09-18 PROCEDURE — 83036 HEMOGLOBIN GLYCOSYLATED A1C: CPT | Performed by: PHYSICIAN ASSISTANT

## 2024-09-18 PROCEDURE — 94375 RESPIRATORY FLOW VOLUME LOOP: CPT | Performed by: PHYSICIAN ASSISTANT

## 2024-09-18 PROCEDURE — 86833 HLA CLASS II HIGH DEFIN QUAL: CPT | Performed by: PHYSICIAN ASSISTANT

## 2024-09-18 PROCEDURE — 85025 COMPLETE CBC W/AUTO DIFF WBC: CPT | Performed by: PATHOLOGY

## 2024-09-18 PROCEDURE — 36415 COLL VENOUS BLD VENIPUNCTURE: CPT | Performed by: PATHOLOGY

## 2024-09-18 PROCEDURE — 99214 OFFICE O/P EST MOD 30 MIN: CPT | Mod: 25 | Performed by: PHYSICIAN ASSISTANT

## 2024-09-18 PROCEDURE — 99000 SPECIMEN HANDLING OFFICE-LAB: CPT | Performed by: PATHOLOGY

## 2024-09-18 PROCEDURE — 84100 ASSAY OF PHOSPHORUS: CPT | Performed by: PATHOLOGY

## 2024-09-18 PROCEDURE — 80197 ASSAY OF TACROLIMUS: CPT | Performed by: PHYSICIAN ASSISTANT

## 2024-09-18 PROCEDURE — 94618 PULMONARY STRESS TESTING: CPT | Performed by: PHYSICIAN ASSISTANT

## 2024-09-18 PROCEDURE — 94726 PLETHYSMOGRAPHY LUNG VOLUMES: CPT | Performed by: PHYSICIAN ASSISTANT

## 2024-09-18 RX ORDER — LIDOCAINE 40 MG/G
CREAM TOPICAL
Status: SHIPPED
Start: 2024-09-18

## 2024-09-18 ASSESSMENT — PAIN SCALES - GENERAL: PAINLEVEL: NO PAIN (0)

## 2024-09-18 NOTE — NURSING NOTE
Chief Complaint   Patient presents with    Lung Transplant     2 month follow up      Vitals were taken and medications were reconciled.    Jeanne Ordonez RMA  12:45 PM

## 2024-09-18 NOTE — PROGRESS NOTES
Transplant Coordinator Note    Reason for visit: Post lung transplant follow up visit   Coordinator: Present   Caregiver: No one present.     Health concerns addressed today:  1. Pt voiced questions about amiodarone, recommended she follow up w/Cardiology through [facility].   2. Haven't done much activity, not really exercising.   3. No allergy issues, rare cough. No shortness of breath.   4. Wearing AVAPs 5hrs/night, feels like it is hard to sleep with it on.   5. Nausea improved, getting 4 cartons of enteral formula daily for about 2 months.   6. K is 5.5 today, will dialyze tomorrow.     Activity/rehab: Not exercising, agreeable to starting OP pulm rehab ([facility]).   Oxygen needs: AVAPs overnight.   Pain management/RX: NA, lidocaine patches.  Next Bronch due: prn  EP: amiodarone, no longer on eliquis.     COVID:  COVID-19 infection (yes/no, date of most recent positive test):   Status/instructions given about COVID-19 vaccine:     Pt Education: medications (use/dose/side effects), how/when to call coordinator, frequency of labs, s/s of infection/rejection, call prior to starting any new medications, lab/vital sign book    Health Maintenance:   Last colonoscopy:   Next colonoscopy due:   Dermatology:  Vaccinations this visit:     Labs, CXR, PFTs reviewed with patient  Medication record reviewed and reconciled  Questions and concerns addressed    Patient Instructions  1. Continue to hydrate with 60-70 oz fluids daily.  2. Continue to exercise daily or most days of the week.  3. Follow up with your primary care provider for annual gender health maintenance procedures.  4. Follow up with colonoscopy schedule.  5. Follow up with annual dermatology visits.  6. It doesn't seem like the COVID vaccine is working well in lung transplant patients. A number of lung transplant patients have gotten sick with COVID even after receiving the vaccines. Based on our recent experience, it can be life-threatening to get  COVID  even after being vaccinated. Please continue to act like you did not get the COVID vaccine - social distancing, wearing a mask, good hand hygiene, etc. If the people around you are vaccinated, it will help reduce the risk of you getting COVID. All members of your household should be vaccinated.  7. Please reach out to Jcarlos with [facility] (Phone: [phone]) to discuss your AVAPs mask, to see if they have any tips or tricks to help it feel better and increase how often you use it. Please   8. We will send a referral for outpatient pulmonary rehab to [facility].   9. Please follow up with your clinic nephrologist regarding discomfort during HD runs.   10. Please check in with the dietician at [facility].   11. Please follow up with your primary care provider regarding your lipid panel results, you may need treatment for high cholesterol levels.   12. You got the RSV vaccination last year, it is good for 2 years. Please get your covid and flu shot soon.    13. Please follow up with your coordinator once you get your updated metoprolol prescription, we would like to confirm the dose.   14. We are going to check some extra labs next time you have an appointment.   15. Follow up with cardiology at [facility] regarding amiodarone.     Next transplant clinic appointment:  3 months with CXR, labs and PFTs  Next lab draw: Pending labs today.    AVS printed at time of check out

## 2024-09-18 NOTE — LETTER
9/18/2024      Sofie Rodriguez  [address] [place] [address]      Dear Colleague,    Thank you for referring your patient, Sofie Rodriguez, to the [facility]. Please see a copy of my visit note below.    [facility]  September 18, 2024         Assessment and Plan:   Sofie Rodriguez is a 64 year old female with h/o COPD s/p BSLT 6/28/22 with course complicated by post-operative hemidiaphragm palsy, recurrent PNAs, positive DSA, EBV viremia, hypogammaglobulinemia, severe gastroparesis s/p G/J tube placement and pyloric botox, GI bleed 2/2 pyloric ulcer, hemobilia s/p ERCP and MRCP, chronic diarrhea, recurrent C diff colitis, ESRD on iHD, recurrent falls with injuries (recent right hip fx s/p ORIF 12/2023), deconditioning, FTT with prolonged admission 2/10-4/15 for failure to thrive and initiation of TPN/lipids, which was unsuccessful secondary to volume overload complicated by hypoxic and hypercapneic respiratory failure and encephalopathy now on NIPPV. This is a routine follow up.      1. Chronic hypoxic/hypercapneic respiratory failure:  S/p bilateral sequential lung transplant:   Suspected CARLEE: denies new pulmonary complaints today. States she is using her AVAPs 5 hours per night, but cannot tolerate it with naps or longer secondary to discomfort. Has not followed up with [facility]. Sating 95% on room air in clinic. Prospera of 0.62 on 5/14 and CMV 6/26 negative. CXR reviewed today and demonstrates stable transplant with . PFTs stable at her prior values.  - Advised patient again to follow up with her Jcarlos Flores at [facility] re: troubleshooting   - Highly recommend wearing her AVAPs with all naps; will check VBG with next draw  - Continue IS including CSA (goal 120-14) and prednisone 5 mg daily  - AZA previously stopped 5/2023 d/t low ImmuKnow assay and EBV viremia; recheck immuknow  -  Bactrim qMWF for PJP ppx  - Start pulmonary rehab     2. Positive DSA: [facility] treatment deferred given frailty and stable PFTs. DSA has been elevated for multiple months, 5780 on 6/26.   - DSA and Prospera pending    3. FTT:  Severe protein calorie malnutrition:  Gastroparesis s/p PEG/J, botox, and G-POEM:  SB hypomotility:  Pyloric ulcer:  Chronic nausea and osmotic diarrhea:  SIBO s/p rifaximin:   Recurrent C diff colitis: chronic osmotic and infectious diarrhea since transplant with recurrent episodes of C diff.  Notable weight loss (40# in a year) d/t diarrhea, GI dysmotility, and intolerance of enteral feeds, most recently on elemental formula. Extensive OP eval and f/u with GI. S/p port placement for TPN and lipids. Continued for ~5 weeks inpatient without considerable improvement, transitioned back to TF. Colonoscopy recommended by GI during last admission, but pt. deferred d/t risk for reintubation and improvement in diarrhea today. Notes stable nausea today with improved stools, taking imodium once/day. States she is tolerating 4 cans of TF at 80 ml. Weight today up 3.5 lb to 88 lbs.   - Continue TF, will have dietician from home infusion touch base    4. ESRD: no on dialysis T/Th/Sat. Was unable to tolerate the volume associated with TPN.     5. A-flutter (recurrent on 3/17)  A-fib, intermittent  HTN  HFpEF: no current issues. Sofie is asking why she is still on amiodarone. Eliquis discontinued.   - Continue metoprolol, amiodarone   - Call and discuss medication with Cardiology    5. Elevated lipid panel: recommend discussion with her PCP    RTC: 3 months  Vaccinations: covid and flu in the next few weeks  Preventative: DEXA > June 2025; Derm is scheduled, states she is UTD with pap, needs to review mammogram; will discuss prior GI recommendations for colonoscopy at next visit    Kaylin Triana PA-C  Pulmonary, Allergy, Critical Care and Sleep Medicine        Interval History:     Hasn't been doing much the  "last three months, didn't follow up with pulmonary rehab but would like to do so. No recent illnesses, was admitted for a clot in her fistula. No allergies or cough, no shortness of breath. Using her AVAPS 5 hours/night, cannot tolerate it more, states it's hard to sleep. Has not been using it for naps. No other chest, no racing or fluttering heart. Nausea is \"mostly good\", doing her 4 cartons per day, running it at 80 ml. BMS are stable, regular at 1-2 times/day.           Review of Systems:   Please see HPI, otherwise the complete 10 point ROS is negative.           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     Hepatitis 2017    Hep C, [facility]     History of blood transfusion      HTN (hypertension)      Infectious mononucleosis      Lung infection 11/30/2022     Osteopenia      Past Surgical History:   Procedure Laterality Date     BRONCHOSCOPY (RIGID OR FLEXIBLE), DIAGNOSTIC N/A 08/02/2022    Procedure: BRONCHOSCOPY, DIAGNOSTIC- inspection Bronch;  Surgeon: Kamala Lovell MD;  Location: [facility]     BRONCHOSCOPY (RIGID OR FLEXIBLE), DIAGNOSTIC N/A 09/13/2022    Procedure: INSPECTION BRONCHOSCOPY, WITH BRONCHOALVEOLAR LAVAGE;  Surgeon: Jose R Mccullough MD;  Location: [facility]     BRONCHOSCOPY (RIGID OR FLEXIBLE), DIAGNOSTIC N/A 11/09/2022    Procedure: BRONCHOSCOPY, WITH BRONCHOALVEOLAR LAVAGE AND BIOPSY;  Surgeon: Cesar Lima MD;  Location: [facility]     BRONCHOSCOPY (RIGID OR FLEXIBLE), DIAGNOSTIC N/A 01/25/2023    Procedure: BRONCHOSCOPY, WITH BRONCHOALVEOLAR LAVAGE AND BIOPSY;  Surgeon: Mason Reddy MD;  Location: [facility]     BRONCHOSCOPY (RIGID OR FLEXIBLE), DIAGNOSTIC N/A 04/19/2023    Procedure: BRONCHOSCOPY, WITH BRONCHOALVEOLAR LAVAGE AND " BIOPSY;  Surgeon: Kamala Lovell MD;  Location:  GI     BRONCHOSCOPY (RIGID OR FLEXIBLE), DIAGNOSTIC N/A 07/12/2023    Procedure: BRONCHOSCOPY, WITH BRONCHOALVEOLAR LAVAGE AND BIOPSY;  Surgeon: Cesar Lima MD;  Location:  GI     BRONCHOSCOPY FLEXIBLE AND RIGID N/A 07/19/2022    Procedure: BRONCHOSCOPY inspection only;  Surgeon: Bob Liao MD;  Location:  GI     COLONOSCOPY  2015     CORONARY ANGIOGRAPHY ADULT ORDER       CV CORONARY ANGIOGRAM N/A 06/30/2021    Procedure: CV CORONARY ANGIOGRAM;  Surgeon: Alexander Cuellar MD;  Location:  HEART CARDIAC CATH LAB     CV RIGHT HEART CATH MEASUREMENTS RECORDED N/A 06/30/2021    Procedure: CV RIGHT HEART CATH;  Surgeon: Alexander Cuellar MD;  Location:  HEART CARDIAC CATH LAB     ENDOSCOPIC PERORAL MYOTOMY N/A 10/09/2023    Procedure: MYOTOMY, ESOPHAGUS, ENDOSCOPIC, ORAL APPROACH;  Surgeon: Gonzalez Navarro MD;  Location:  OR     ENDOSCOPIC RETROGRADE CHOLANGIOPANCREATOGRAM N/A 08/11/2022    Procedure: ENDOSCOPIC RETROGRADE CHOLANGIOPANCREATOGRAPHY WITH PANCREATIC DUCT NEEDLE KNIFE AND STENT PLACEMENT, BILE DUCT SPHINCTEROTOMY, BLOOD/DEBRIS REMOVAL AND STENT PLACEMENT;  Surgeon: Cosmo Arroyo MD;  Location:  OR     ENDOSCOPIC RETROGRADE CHOLANGIOPANCREATOGRAM N/A 10/07/2022    Procedure: ENDOSCOPIC RETROGRADE CHOLANGIOPANCREATOGRAPHY with biliary and pancreatic stent removal, debris removal;  Surgeon: Cosmo Arroyo MD;  Location:  OR     ENT SURGERY  1974    tonsillectomy     ENTEROSCOPY SMALL BOWEL N/A 08/11/2022    Procedure: SMALL BOWEL ENTEROSCOPY;  Surgeon: Cosmo Arroyo MD;  Location:  OR     ESOPHAGOGASTRODUODENOSCOPY, WITH NASOGASTRIC TUBE INSERTION N/A 07/01/2022    Procedure: ESOPHAGOGASTRODUODENOSCOPY, WITH NASOJEJUNAL TUBE INSERTION;  Surgeon: Ozzy Nickerson MD;  Location:  GI     ESOPHAGOSCOPY, GASTROSCOPY, DUODENOSCOPY (EGD), COMBINED N/A 08/03/2022    Procedure: ESOPHAGOGASTRODUODENOSCOPY  (EGD);  Surgeon: Ira Andres MD;  Location:  GI     ESOPHAGOSCOPY, GASTROSCOPY, DUODENOSCOPY (EGD), COMBINED N/A 01/25/2023    Procedure: ESOPHAGOGASTRODUODENOSCOPY (EGD) with botox injection;  Surgeon: Gonzalez Navarro MD;  Location:  GI     ESOPHAGOSCOPY, GASTROSCOPY, DUODENOSCOPY (EGD), COMBINED N/A 10/09/2023    Procedure: ESOPHAGOGASTRODUODENOSCOPY;  Surgeon: Gonzalez Navarro MD;  [facility]: [facility]     HAND SURGERY       INSERT CHEST TUBE Right 09/13/2022    Procedure: Insert chest tube;  Surgeon: Jose R Mccullough MD;  Location: [facility]     IR CVC TUNNEL PLACEMENT > 5 YRS OF AGE  09/26/2022     IR CVC TUNNEL PLACEMENT > 5 YRS OF AGE  02/14/2024     IR CVC TUNNEL REMOVAL RIGHT  3/6/2024     IR GASTRO JEJUNOSTOMY TUBE CHANGE  08/31/2022     IR GASTRO JEJUNOSTOMY TUBE CHANGE  12/21/2022     IR GASTRO JEJUNOSTOMY TUBE CHANGE  07/12/2023     IR GASTRO JEJUNOSTOMY TUBE CHANGE  08/18/2023     IR GASTRO JEJUNOSTOMY TUBE CHANGE  11/14/2023     IR GASTRO JEJUNOSTOMY TUBE CHANGE  4/8/2024     IR GASTRO JEJUNOSTOMY TUBE PLACEMENT  07/27/2022     IR THORACENTESIS  08/29/2022     LEEP TX, CERVICAL  04/07/2017    HECTOR III     LYMPH NODE BIOPSY Left 2005    Left axilla, benign- [place]     MIDLINE INSERTION - DOUBLE LUMEN Left 07/28/2022    20cm, Basilic vein     PICC DOUBLE LUMEN PLACEMENT Right 03/04/2024    5FR DL PICc, basiliv vein- L-36cm, 1cm out.     REPLACE GASTROJEJUNOSTOMY TUBE, PERCUTANEOUS  10/07/2022    Procedure: Replace Gastrojejunostomy Tube;  Surgeon: Cosmo Arroyo MD;  [facility]     THORACENTESIS Left 08/29/2022    Procedure: THORACENTESIS;  Surgeon: Bo Capone PA-C;  [place] [facility] [place]     THORACENTESIS Left 09/13/2022    Procedure: Thoracentesis;  Surgeon: Jose R Mccullough MD;  Location: [facility]     THROMBECTOMY UPPER EXTREMITY Left 07/02/2022    Procedure: LEFT RADIAL ARM THROMBECTOMY;  Surgeon: Christie Graham MD;  Location: [facility]     TRANSPLANT LUNG  RECIPIENT SINGLE X2 Bilateral 06/28/2022    Procedure: Clamshell Incision, Bilateral Sequential Lung Transplant, On Cardiopulmonary Bypass, Flexible Bronchoscopy;  Surgeon: Sue Sunshine MD;  Location:  OR           Family History:     No family history on file.         Social History:     Social History     Socioeconomic History     Marital status:      Spouse name: Not on file     Number of children: Not on file     Years of education: Not on file     Highest education level: Not on file   Occupational History     Not on file   Tobacco Use     Smoking status: Former     Current packs/day: 0.00     Average packs/day: 0.5 packs/day for 30.0 years (15.0 ttl pk-yrs)     Types: Cigarettes     Start date: 11/11/1990     Quit date: 11/11/2020     Years since quitting: 3.8     Smokeless tobacco: Never   Substance and Sexual Activity     Alcohol use: Not Currently     Comment: Stopped drinking in 2020     Drug use: Not Currently     Types: Marijuana, Methamphetamines     Comment: hx:marijuana and methamphetamine-quit both unsure ?  2-3 yrs ago     Sexual activity: Not on file   Other Topics Concern     Parent/sibling w/ CABG, MI or angioplasty before 65F 55M? Not Asked   Social History Narrative     Not on file     Social Determinants of Health     Financial Resource Strain: Low Risk  (7/10/2024)    Received from [employer]    Overall Financial Resource Strain (CARDIA)      Difficulty of Paying Living Expenses: Not very hard   Food Insecurity: No Food Insecurity (8/10/2024)    [facility]    Hunger Vital Sign      Worried About Running Out of Food in the Last Year: Never true      Ran Out of Food in the Last Year: Never true   Transportation Needs: No Transportation Needs (8/10/2024)    Received from [employer]    Transportation Needs      In the past 12 months, has lack of transportation kept you from medical appointments,  meetings, work, or from getting medicines or things needed for daily living?: No   Physical Activity: Insufficiently Active (7/10/2024)    Received from [employer] [facility]    Exercise Vital Sign      Days of Exercise per Week: 5 days      Minutes of Exercise per Session: 20 min   Stress: No Stress Concern Present (7/10/2024)    [facility]    [facility] - Occupational Stress Questionnaire      Feeling of Stress : Not at all   Social Connections: Moderately Isolated (7/10/2024)    [facility]    Social Connection and Isolation Panel [NHANES]      Frequency of Communication with Friends and Family: More than three times a week      Frequency of Social Gatherings with Friends and Family: Three times a week      Attends [employer] Services: 1 to 4 times per year      Active Member of Clubs or Organizations: No      Attends Club or Organization Meetings: Never      Marital Status:    Interpersonal Safety: Not At Risk (8/15/2024)    Received from [employer]    Intimate Partner Violence      Are you in a relationship where you are physically hurt, threatened and/or made to feel afraid?: No   Housing Stability: Low Risk  (8/10/2024)    Received from [employer]    Housing Stability Vital Sign      Unable to Pay for Housing in the Last Year: No      Number of Times Moved in the Last Year: 0      Homeless in the Last Year: No            Medications:     Current Outpatient Medications   Medication Sig Dispense Refill     lidocaine (LMX4) 4 % external cream Apply topically once as needed for mild pain.       acetaminophen (TYLENOL) 500 MG tablet 500-1,000 mg by Per J Tube route 3 times daily as needed for mild pain       amiodarone (PACERONE) 200 MG tablet 1 tablet (200 mg) by Oral or Feeding Tube route daily 30 tablet 4     B Complex-C-Folic Acid (DIALYVITE) TABS 1  "tablet by Per J Tube route every morning 30 tablet 11     Biotin 2500 MCG CHEW Take 2,500 mcg by mouth daily       Calcium Carbonate-Vitamin D 600-10 MG-MCG TABS 1 tablet by Per J Tube route 3 times daily 90 tablet 11     cycloSPORINE modified (GENERIC EQUIVALENT) 100 MG capsule Take 1 capsule (100 mg) by mouth every morning AND 1 capsule (100 mg) every evening. 60 capsule 11     levothyroxine (SYNTHROID/LEVOTHROID) 25 MCG tablet Take 1 tablet (25 mcg) by mouth daily 30 tablet 4     Lidocaine (LIDOCARE) 4 % Patch Place 1 patch onto the skin every 24 hours To prevent lidocaine toxicity, patient should be patch free for 12 hrs daily. 30 patch 3     loperamide (IMODIUM A-D) 2 MG tablet 1 tablet (2 mg) by Per J Tube route 4 times daily as needed for diarrhea       metoprolol tartrate (LOPRESSOR) 50 MG tablet 0.5 tablets (25 mg) by Per Feeding Tube route 2 times daily 60 tablet 11     midodrine (PROAMATINE) 10 MG tablet Take 1 tablet (10 mg) by mouth 2 times daily as needed (for map less than 65 or sbp less than 100 on HD days) 30 tablet 0     multivitamin (CENTRUM SILVER) tablet Take 1 tablet by mouth daily       predniSONE (DELTASONE) 5 MG tablet 1 tablet (5 mg) by Per J Tube route every morning 30 tablet 11     protein modular (PROSOURCE TF) LIQD 2 packets by Per Feeding Tube route daily       sevelamer carbonate, RENVELA, 0.8 GM PACK Packet Take 1 packet (0.8 g) by mouth 3 times daily (with meals) (Patient taking differently: Take 0.8 g by mouth 5 times daily)       sulfamethoxazole-trimethoprim (BACTRIM) 400-80 MG tablet Take 1 tablet by mouth three times a week 30 tablet 2     No current facility-administered medications for this visit.            Physical Exam:   /69   Pulse 74   Ht 1.6 m (5' 3\")   Wt 39.9 kg (88 lb)   SpO2 95%   BMI 15.59 kg/m      GENERAL: ill appearing, NAD  HEENT: NCAT, EOMI, no scleral icterus, oral mucosa moist and without lesions  Neck: no cervical or supraclavicular " adenopathy  Lungs: moderate airflow, mainly clear  CV: RRR, S1S2, no murmurs noted  Abdomen: cachectic appearing, normoactive BS  Lymph: no edema  Neuro: AAO X 3, CN 2-12 grossly intact  Psychiatric: normal affect, good eye contact  Skin: no rash, jaundice or lesions on limited exam         Data:   All laboratory and imaging data reviewed.      Recent Results (from the past 168 hour(s))   6 minute walk test    Collection Time: 09/18/24 12:00 AM   Result Value Ref Range    6 min walk (FT) 1,235 1,462 ft    6 Min Walk (M) 376 446 m   Magnesium    Collection Time: 09/18/24 10:58 AM   Result Value Ref Range    Magnesium 2.7 (H) 1.7 - 2.3 mg/dL   Phosphorus    Collection Time: 09/18/24 10:58 AM   Result Value Ref Range    Phosphorus 6.6 (H) 2.5 - 4.5 mg/dL   Comprehensive metabolic panel    Collection Time: 09/18/24 10:58 AM   Result Value Ref Range    Sodium 135 135 - 145 mmol/L    Potassium 5.4 (H) 3.4 - 5.3 mmol/L    Carbon Dioxide (CO2) 26 22 - 29 mmol/L    Anion Gap 16 (H) 7 - 15 mmol/L    Urea Nitrogen 81.5 (H) 8.0 - 23.0 mg/dL    Creatinine 5.49 (H) 0.51 - 0.95 mg/dL    GFR Estimate 8 (L) >60 mL/min/1.73m2    Calcium 9.6 8.8 - 10.4 mg/dL    Chloride 93 (L) 98 - 107 mmol/L    Glucose 92 70 - 99 mg/dL    Alkaline Phosphatase 82 40 - 150 U/L    AST 36 0 - 45 U/L    ALT 23 0 - 50 U/L    Protein Total 7.2 6.4 - 8.3 g/dL    Albumin 4.1 3.5 - 5.2 g/dL    Bilirubin Total 0.3 <=1.2 mg/dL    Patient Fasting > 8hrs? Unknown    Lipid Profile    Collection Time: 09/18/24 10:58 AM   Result Value Ref Range    Cholesterol 236 (H) <200 mg/dL    Triglycerides 223 (H) <150 mg/dL    Direct Measure HDL 68 >=50 mg/dL    LDL Cholesterol Calculated 123 (H) <100 mg/dL    Non HDL Cholesterol 168 (H) <130 mg/dL    Patient Fasting > 8hrs? Unknown    INR    Collection Time: 09/18/24 10:58 AM   Result Value Ref Range    INR 0.88 0.85 - 1.15   CBC with platelets and differential    Collection Time: 09/18/24 10:58 AM   Result Value Ref Range     WBC Count 8.2 4.0 - 11.0 10e3/uL    RBC Count 2.98 (L) 3.80 - 5.20 10e6/uL    Hemoglobin 10.6 (L) 11.7 - 15.7 g/dL    Hematocrit 33.4 (L) 35.0 - 47.0 %     (H) 78 - 100 fL    MCH 35.6 (H) 26.5 - 33.0 pg    MCHC 31.7 31.5 - 36.5 g/dL    RDW 13.7 10.0 - 15.0 %    Platelet Count 232 150 - 450 10e3/uL    % Neutrophils 72 %    % Lymphocytes 11 %    % Monocytes 9 %    % Eosinophils 3 %    % Basophils 1 %    % Immature Granulocytes 3 %    NRBCs per 100 WBC 0 <1 /100    Absolute Neutrophils 5.9 1.6 - 8.3 10e3/uL    Absolute Lymphocytes 0.9 0.8 - 5.3 10e3/uL    Absolute Monocytes 0.8 0.0 - 1.3 10e3/uL    Absolute Eosinophils 0.3 0.0 - 0.7 10e3/uL    Absolute Basophils 0.1 0.0 - 0.2 10e3/uL    Absolute Immature Granulocytes 0.3 <=0.4 10e3/uL    Absolute NRBCs 0.0 10e3/uL   Hemoglobin A1c    Collection Time: 09/18/24 11:22 AM   Result Value Ref Range    Estimated Average Glucose 97 <117 mg/dL    Hemoglobin A1C 5.0 <5.7 %   General PFT Lab (Please always keep checked)    Collection Time: 09/18/24 11:45 AM   Result Value Ref Range    FVC-Pred 2.75 L    FVC-Pre 1.37 L    FVC-%Pred-Pre 49 %    FEV1-Pre 1.29 L    FEV1-%Pred-Pre 58 %    FEV1FVC-Pred 80 %    FEV1FVC-Pre 95 %    FEFMax-Pred 6.01 L/sec    FEFMax-Pre 4.75 L/sec    FEFMax-%Pred-Pre 78 %    FEF2575-Pred 2.02 L/sec    FEF2575-Pre 2.81 L/sec    KYE6786-%Pred-Pre 139 %    ExpTime-Pre 6.13 sec    FIFMax-Pre 3.99 L/sec    VC-Pred 3.10 L    VC-Pre 1.36 L    VC-%Pred-Pre 43 %    IC-Pred 2.06 L    IC-Pre 0.95 L    IC-%Pred-Pre 46 %    ERV-Pred 1.03 L    ERV-Pre 0.41 L    ERV-%Pred-Pre 39 %    FEV1FEV6-Pred 80 %    FEV1FEV6-Pre 94 %    FRCPleth-Pred 2.65 L    FRCPleth-Pre 2.79 L    FRCPleth-%Pred-Pre 105 %    RVPleth-Pred 1.89 L    RVPleth-Pre 2.38 L    RVPleth-%Pred-Pre 125 %    TLCPleth-Pred 4.77 L    TLCPleth-Pre 3.74 L    TLCPleth-%Pred-Pre 78 %    DLCOunc-Pred 19.08 ml/min/mmHg    DLCOunc-Pre 14.22 ml/min/mmHg    DLCOunc-%Pred-Pre 74 %    VA-Pre 2.49 L    VA-%Pred-Pre  55 %    FEV1SVC-Pred 71 %    FEV1SVC-Pre 95 %     PFT interpretation:  Maneuver: valid and meets ATS guidelines  Moderate obstruction based on Z score  Compared to prior: FEV1 of 1.29 is 40 ml below prior  The decrease in FVC may represent air trapping v. restrictive physiology.  Lung volumes would be necessary to determine.    6 MWT today on RA < LLN without significant desaturation or hypoxia    Transplant Coordinator Note    Reason for visit: Post lung transplant follow up visit   Coordinator: Present   Caregiver: No one present.     Health concerns addressed today:  1. Pt voiced questions about amiodarone, recommended she follow up w/Cardiology through [facility].   2. Haven't done much activity, not really exercising.   3. No allergy issues, rare cough. No shortness of breath.   4. Wearing AVAPs 5hrs/night, feels like it is hard to sleep with it on.   5. Nausea improved, getting 4 cartons of enteral formula daily for about 2 months.   6. K is 5.5 today, will dialyze tomorrow.     Activity/rehab: Not exercising, agreeable to starting OP pulm rehab ([facility]).   Oxygen needs: AVAPs overnight.   Pain management/RX: NA, lidocaine patches.  Next Bronch due: prn  EP: amiodarone, no longer on eliquis.     COVID:  COVID-19 infection (yes/no, date of most recent positive test):   Status/instructions given about COVID-19 vaccine:     Pt Education: medications (use/dose/side effects), how/when to call coordinator, frequency of labs, s/s of infection/rejection, call prior to starting any new medications, lab/vital sign book    Health Maintenance:   Last colonoscopy:   Next colonoscopy due:   Dermatology:  Vaccinations this visit:     Labs, CXR, PFTs reviewed with patient  Medication record reviewed and reconciled  Questions and concerns addressed    Patient Instructions  1. Continue to hydrate with 60-70 oz fluids daily.  2. Continue to exercise daily or most days of the week.  3. Follow up with your primary  care provider for annual gender health maintenance procedures.  4. Follow up with colonoscopy schedule.  5. Follow up with annual dermatology visits.  6. It doesn't seem like the COVID vaccine is working well in lung transplant patients. A number of lung transplant patients have gotten sick with COVID even after receiving the vaccines. Based on our recent experience, it can be life-threatening to get COVID  even after being vaccinated. Please continue to act like you did not get the COVID vaccine - social distancing, wearing a mask, good hand hygiene, etc. If the people around you are vaccinated, it will help reduce the risk of you getting COVID. All members of your household should be vaccinated.  7. Please reach out to Jcarlos with [employer] (Phone: [phone]) to discuss your AVAPs mask, to see if they have any tips or tricks to help it feel better and increase how often you use it. Please   8. We will send a referral for outpatient pulmonary rehab to [facility].   9. Please follow up with your clinic nephrologist regarding discomfort during HD runs.   10. Please check in with the dietician at [facility].   11. Please follow up with your primary care provider regarding your lipid panel results, you may need treatment for high cholesterol levels.   12. You got the RSV vaccination last year, it is good for 2 years. Please get your covid and flu shot soon.    13. Please follow up with your coordinator once you get your updated metoprolol prescription, we would like to confirm the dose.   14. We are going to check some extra labs next time you have an appointment.   15. Follow up with cardiology at [facility] regarding amiodarone.     Next transplant clinic appointment:  3 months with CXR, labs and PFTs  Next lab draw: Pending labs today.    AVS printed at time of check out        Again, thank you for allowing me to participate in the care of your patient.        Sincerely,        Kaylin Triana,  FLOYD

## 2024-09-18 NOTE — PATIENT INSTRUCTIONS
Patient Instructions  1. Continue to hydrate with 60-70 oz fluids daily.  2. Continue to exercise daily or most days of the week.  3. Follow up with your primary care provider for annual gender health maintenance procedures.  4. Follow up with colonoscopy schedule.  5. Follow up with annual dermatology visits.  6. It doesn't seem like the COVID vaccine is working well in lung transplant patients. A number of lung transplant patients have gotten sick with COVID even after receiving the vaccines. Based on our recent experience, it can be life-threatening to get COVID  even after being vaccinated. Please continue to act like you did not get the COVID vaccine - social distancing, wearing a mask, good hand hygiene, etc. If the people around you are vaccinated, it will help reduce the risk of you getting COVID. All members of your household should be vaccinated.  7. Please reach out to Jcarlos with [employer] (Phone: [phone]) to discuss your AVAPs mask, to see if they have any tips or tricks to help it feel better and increase how often you use it. Please   8. We will send a referral for outpatient pulmonary rehab to [facility].   9. Please follow up with your clinic nephrologist regarding discomfort during HD runs.   10. Please check in with the dietician at [facility].   11. Please follow up with your primary care provider regarding your lipid panel results, you may need treatment for high cholesterol levels.   12. You got the RSV vaccination last year, it is good for 2 years. Please get your covid and flu shot soon.    13. Please follow up with your coordinator once you get your updated metoprolol prescription, we would like to confirm the dose.   14. We are going to check some extra labs next time you have an appointment.   15. Follow up with cardiology at [facility] regarding amiodarone.     Next transplant clinic appointment:  3 months with CXR, labs and PFTs  Next lab draw: Pending labs  today.    AVS printed at time of check out

## 2024-09-19 DIAGNOSIS — J98.6 DIAPHRAGM DYSFUNCTION: ICD-10-CM

## 2024-09-23 LAB — PROSPERA TRANSPLANT MONITORING: 0.49 %

## 2024-09-24 DIAGNOSIS — Z94.2 S/P LUNG TRANSPLANT (H): Chronic | ICD-10-CM

## 2024-09-24 RX ORDER — METOPROLOL TARTRATE 25 MG/1
12.5 TABLET, FILM COATED ORAL 2 TIMES DAILY
Status: SHIPPED
Start: 2024-09-24

## 2024-09-25 ENCOUNTER — MYC MEDICAL ADVICE (OUTPATIENT)
Dept: TRANSPLANT | Facility: CLINIC | Age: 64
End: 2024-09-25
Payer: MEDICARE

## 2024-09-25 ENCOUNTER — TELEPHONE (OUTPATIENT)
Dept: TRANSPLANT | Facility: CLINIC | Age: 64
End: 2024-09-25
Payer: MEDICARE

## 2024-09-25 ENCOUNTER — MYC MEDICAL ADVICE (OUTPATIENT)
Dept: PULMONOLOGY | Facility: CLINIC | Age: 64
End: 2024-09-25
Payer: MEDICARE

## 2024-09-25 DIAGNOSIS — Z94.2 S/P LUNG TRANSPLANT (H): Primary | ICD-10-CM

## 2024-09-25 NOTE — TELEPHONE ENCOUNTER
[facility] noting that the patient would need a lab kit for the [facility] lab draw. Kit request sent at this time, to be sent to pt's daughters's address:     [address] [id number]    Patient updated.     4182 Addendum:  Update received, [facility] lab kits do not exist, unable to mail kit out to patient. Voicemail left w/[facility], awaiting call back. If they are unable to draw, patient will need the lab at a  clinic ([place] or [place]). Patient updated.

## 2024-09-25 NOTE — TELEPHONE ENCOUNTER
[facility] Call Center    Phone Message    May a detailed message be left on voicemail: yes     Reason for Call: Other: patient called in wanting to change her appointments in December with [facility]. Patient stated that she doesn't like to start before 1030am. With what is open with [facility] there was no opeanings to move this to where it would work to change appointments. No orders attached to provider visit so unsure how soon it was needed. Patient is wanting to know if we can push it out to January in order to get it done later in the day. Patient stated she will be seen if needed but its just hard for her to get here that early. I did offer to split appointments into 2 days but she didn't want to do that either. Please advise how to move forward as I know that that access with providers is difficult. Please let me know your thoughts and I will call patient back to schedule.     Action Taken: Message routed to:  Other: RNCC    Travel Screening: Not Applicable

## 2024-09-26 NOTE — TELEPHONE ENCOUNTER
Patient agreeable to [facility] lab at [facility] clinic on 10/9 at 9:30.     Patient updated via [employer].

## 2024-09-26 NOTE — PROGRESS NOTES
08/03/2022: PT MEETS MEDICARE CRITERIA FOR ENTERAL. ANTICIPATED LENGTH OF THERAPY MUST BE 90 DAYS OR GREATER AT THE TIME OF SERVICE. 1 MONTH PUMP LETTER MUST BE ATTACHED.   OTHER THERAPY: RETURN TO INTAKE FOR COVERAGE DETERMINATION    NO TPA COVERAGE  LINECARE AND HYDRATION ARE COVERED    [facility] CANNOT BILL FOR NURSING, MUST BE RI. Right ear    02/12/2024 TT from [facility], could you please check benefits on pt Sofie Jennifer for TPN? MSL  PER SHABNAM PT WILL MEET MEDICARE CRITERIA FOR TPN ONCE 90 DAY GERMAINE OR GREATER DOCUMENTATION IS ADDED BY MD. TPN MUST BE ON CADD FOR INSUR COVERAGE. IF NOT, RETURN TO INTAKE FOR SELF PAY QUOTE. OTHER THERAPY: IF CADD DISPENSED, RETURN TO INTAKE FOR ABN. HB VS NON HB STATUS APPLIES [facility]

## 2024-09-27 ENCOUNTER — MYC MEDICAL ADVICE (OUTPATIENT)
Dept: PULMONOLOGY | Facility: CLINIC | Age: 64
End: 2024-09-27
Payer: MEDICARE

## 2024-09-27 LAB
DONOR IDENTIFICATION: NORMAL
DQB2: 4869
DSA COMMENTS: NORMAL
DSA PRESENT: YES
DSA TEST METHOD: NORMAL
ORGAN: NORMAL
SA 1  COMMENTS: NORMAL
SA 1 CELL: NORMAL
SA 1 TEST METHOD: NORMAL
SA 2 CELL: NORMAL
SA 2 COMMENTS: NORMAL
SA 2 TEST METHOD: NORMAL
SA1 HI RISK ABY: NORMAL
SA1 MOD RISK ABY: NORMAL
SA2 HI RISK ABY: NORMAL
SA2 MOD RISK ABY: NORMAL
UNACCEPTABLE ANTIGENS: NORMAL
UNOS CPRA: 37

## 2024-09-27 NOTE — TELEPHONE ENCOUNTER
Patient requesting contact information for cardiologist who started her on amiodarone. Per chart review, amiodarone was started during hospitalization this past spring, it is unclear if patient has followed up with cardiology. Call out to patient to discuss, awaiting call back.    0235 Addendum:  Pt confirms she has not seen cardiology or electrophysiology outpatient. Patient would prefer to see someone locally, she will reach out to her PCP for a cardiology referral.

## 2024-10-02 ENCOUNTER — TELEPHONE (OUTPATIENT)
Dept: TRANSPLANT | Facility: CLINIC | Age: 64
End: 2024-10-02
Payer: MEDICARE

## 2024-10-02 ENCOUNTER — RESULTS ONLY (OUTPATIENT)
Dept: LAB | Facility: CLINIC | Age: 64
End: 2024-10-02

## 2024-10-02 ENCOUNTER — LAB (OUTPATIENT)
Dept: LAB | Facility: CLINIC | Age: 64
End: 2024-10-02
Payer: MEDICARE

## 2024-10-02 DIAGNOSIS — Z94.2 LUNG REPLACED BY TRANSPLANT (H): ICD-10-CM

## 2024-10-02 LAB
CYCLOSPORINE BLD LC/MS/MS-MCNC: 159 UG/L (ref 50–400)
TME LAST DOSE: NORMAL H
TME LAST DOSE: NORMAL H

## 2024-10-02 PROCEDURE — 80158 DRUG ASSAY CYCLOSPORINE: CPT

## 2024-10-02 NOTE — TELEPHONE ENCOUNTER
DATE:  10/2/2024     TIME OF RECEIPT FROM LAB:  9:47 am    ORDERING PROVIDER: RABIA Bush    LAB TEST:  Venous blood gas       LAB VALUE:  pH venous 7.21    pCO2 venous 71.0    pO2 28.0    RESULTS GIVEN WITH READ-BACK TO (PROVIDER): OLGA Hernandez    TIME LAB VALUE REPORTED TO PROVIDER:   9:59 am

## 2024-10-02 NOTE — TELEPHONE ENCOUNTER
Critical venous gas lab results received, discussed w/Dr. Gong, this is patient's baseline. [employer] message sent to patient to continue to encourage her to wear her AVAPS overnight and with naps. Encouraged her to reach out to [employer] to troubleshoot the mask fit.

## 2024-10-03 ENCOUNTER — HOME INFUSION (OUTPATIENT)
Dept: HOME HEALTH SERVICES | Facility: HOME HEALTH | Age: 64
End: 2024-10-03
Payer: MEDICARE

## 2024-10-03 DIAGNOSIS — Z94.2 LUNG REPLACED BY TRANSPLANT (H): ICD-10-CM

## 2024-10-03 DIAGNOSIS — J96.02 ACUTE RESPIRATORY FAILURE WITH HYPERCAPNIA (H): ICD-10-CM

## 2024-10-03 DIAGNOSIS — J44.9 CHRONIC OBSTRUCTIVE PULMONARY DISEASE (H): Primary | ICD-10-CM

## 2024-10-03 DIAGNOSIS — Z94.2 S/P LUNG TRANSPLANT (H): Primary | ICD-10-CM

## 2024-10-03 DIAGNOSIS — K31.84 GASTROPARESIS: ICD-10-CM

## 2024-10-03 LAB
HCO3 BLDV-SCNC: 22.7 MMOL/L (ref 23–28)
Lab: 28 MM HG (ref 30–40)
Lab: 7.21 (ref 7.31–7.41)
Lab: 71 MM HG (ref 41–51)
SCAN LAB RESULTS (EXTERNAL): ABNORMAL

## 2024-10-03 RX ORDER — NUTRITIONAL SUPPLEMENT/FIBER 0.06 G-1.4
960 LIQUID (ML) ORAL DAILY
Qty: 28800 ML | Refills: 11 | Status: ACTIVE | OUTPATIENT
Start: 2024-10-23 | End: 2025-10-23

## 2024-10-03 RX ORDER — AMINO AC/PROTEIN HYDR/WHEY PRO 10G-100/30
45 LIQUID (ML) ORAL 2 TIMES DAILY
Qty: 2700 ML | Refills: 11 | Status: ACTIVE | OUTPATIENT
Start: 2024-10-23 | End: 2025-10-23

## 2024-10-04 RX ORDER — CYCLOSPORINE 25 MG/1
75 CAPSULE, LIQUID FILLED ORAL DAILY
Qty: 90 CAPSULE | Refills: 11 | Status: SHIPPED | OUTPATIENT
Start: 2024-10-04 | End: 2024-10-17

## 2024-10-04 NOTE — RESULT ENCOUNTER NOTE
Cylosporine level 159 at 12 hours, on 10/2.  Goal 120-140.   Current dose 100 mg in AM, 100 mg in PM    Dose changed to 100 mg in AM, 75 mg in PM   Recheck level in 7-10 days.    Discussed with patient via phone.

## 2024-10-07 ENCOUNTER — MYC REFILL (OUTPATIENT)
Dept: PULMONOLOGY | Facility: CLINIC | Age: 64
End: 2024-10-07
Payer: MEDICARE

## 2024-10-07 DIAGNOSIS — I48.0 PAROXYSMAL A-FIB (H): ICD-10-CM

## 2024-10-07 RX ORDER — AMIODARONE HYDROCHLORIDE 200 MG/1
200 TABLET ORAL DAILY
Qty: 30 TABLET | Refills: 0 | Status: SHIPPED | OUTPATIENT
Start: 2024-10-07

## 2024-10-08 DIAGNOSIS — E03.9 HYPOTHYROIDISM, UNSPECIFIED TYPE: ICD-10-CM

## 2024-10-08 RX ORDER — LEVOTHYROXINE SODIUM 25 UG/1
25 TABLET ORAL DAILY
Qty: 30 TABLET | Refills: 4 | Status: SHIPPED | OUTPATIENT
Start: 2024-10-08

## 2024-10-09 ENCOUNTER — LAB (OUTPATIENT)
Dept: LAB | Facility: CLINIC | Age: 64
End: 2024-10-09
Payer: MEDICARE

## 2024-10-09 DIAGNOSIS — Z94.2 S/P LUNG TRANSPLANT (H): ICD-10-CM

## 2024-10-09 PROCEDURE — 36415 COLL VENOUS BLD VENIPUNCTURE: CPT

## 2024-10-09 PROCEDURE — 86352 CELL FUNCTION ASSAY W/STIM: CPT | Mod: 90

## 2024-10-11 LAB — IMMUKNOW IMMUNE CELL FUNCTION: 496 NG/ML

## 2024-10-16 ENCOUNTER — LAB (OUTPATIENT)
Dept: LAB | Facility: CLINIC | Age: 64
End: 2024-10-16
Payer: MEDICARE

## 2024-10-16 LAB
CYCLOSPORINE BLD LC/MS/MS-MCNC: 162 UG/L (ref 50–400)
TME LAST DOSE: NORMAL H
TME LAST DOSE: NORMAL H

## 2024-10-16 PROCEDURE — 80158 DRUG ASSAY CYCLOSPORINE: CPT | Performed by: PHYSICIAN ASSISTANT

## 2024-10-17 ENCOUNTER — MYC MEDICAL ADVICE (OUTPATIENT)
Dept: TRANSPLANT | Facility: CLINIC | Age: 64
End: 2024-10-17
Payer: MEDICARE

## 2024-10-17 DIAGNOSIS — Z94.2 S/P LUNG TRANSPLANT (H): ICD-10-CM

## 2024-10-17 RX ORDER — CYCLOSPORINE 25 MG/1
75 CAPSULE, LIQUID FILLED ORAL 2 TIMES DAILY
Qty: 90 CAPSULE | Refills: 11 | Status: SHIPPED | OUTPATIENT
Start: 2024-10-17 | End: 2024-10-21

## 2024-10-17 NOTE — RESULT ENCOUNTER NOTE
Cyclosporine level 162 at 12 hours, on 10/16.  Goal 120-140.   Current dose 100 mg in AM, 75 mg in PM    Dose changed to 75 mg in AM, 75 mg in PM   Recheck level in 1 week.     Discussed with patient via [email or web address] message.

## 2024-10-21 DIAGNOSIS — Z94.2 S/P LUNG TRANSPLANT (H): ICD-10-CM

## 2024-10-21 NOTE — TELEPHONE ENCOUNTER
Please update rx. Currently it is written for a 15ds and pt would like a 1 month supply on file for    Cyclosporine modified 25mg caps  Sig: 3 BID Qty: 180    Please verify and send new rx. Thank you!     [facility] spec/mail pharmacy  [phone]

## 2024-10-22 ENCOUNTER — HOME INFUSION (OUTPATIENT)
Dept: HOME HEALTH SERVICES | Facility: HOME HEALTH | Age: 64
End: 2024-10-22
Payer: MEDICARE

## 2024-10-22 DIAGNOSIS — J44.9 COPD (CHRONIC OBSTRUCTIVE PULMONARY DISEASE) (H): ICD-10-CM

## 2024-10-22 DIAGNOSIS — J96.02 ACUTE RESPIRATORY FAILURE WITH HYPERCAPNIA (H): ICD-10-CM

## 2024-10-22 DIAGNOSIS — Z94.2 LUNG TRANSPLANT STATUS, BILATERAL (H): ICD-10-CM

## 2024-10-22 DIAGNOSIS — Z94.2 LUNG REPLACED BY TRANSPLANT (H): Primary | ICD-10-CM

## 2024-10-22 DIAGNOSIS — K31.84 GASTROPARESIS: ICD-10-CM

## 2024-10-22 RX ORDER — SULFAMETHOXAZOLE AND TRIMETHOPRIM 400; 80 MG/1; MG/1
1 TABLET ORAL
Qty: 30 TABLET | Refills: 2 | Status: SHIPPED | OUTPATIENT
Start: 2024-10-23

## 2024-10-22 RX ORDER — NUTRITIONAL SUPPLEMENT/FIBER 0.06 G-1.4
1000 LIQUID (ML) ORAL DAILY
Qty: 30000 ML | Refills: 10 | Status: ACTIVE | OUTPATIENT
Start: 2024-10-23 | End: 2025-09-14

## 2024-10-22 RX ORDER — SULFAMETHOXAZOLE AND TRIMETHOPRIM 400; 80 MG/1; MG/1
1 TABLET ORAL
Qty: 30 TABLET | Refills: 2 | Status: SHIPPED | OUTPATIENT
Start: 2024-10-23 | End: 2024-10-22

## 2024-10-22 RX ORDER — CYCLOSPORINE 25 MG/1
75 CAPSULE, LIQUID FILLED ORAL 2 TIMES DAILY
Qty: 180 CAPSULE | Refills: 11 | Status: SHIPPED | OUTPATIENT
Start: 2024-10-22

## 2024-10-23 ENCOUNTER — HOSPITAL ENCOUNTER (OUTPATIENT)
Dept: INTERVENTIONAL RADIOLOGY/VASCULAR | Facility: CLINIC | Age: 64
Discharge: HOME | End: 2024-10-23
Attending: PHYSICIAN ASSISTANT | Admitting: RADIOLOGY
Payer: MEDICARE

## 2024-10-23 VITALS
DIASTOLIC BLOOD PRESSURE: 59 MMHG | SYSTOLIC BLOOD PRESSURE: 119 MMHG | OXYGEN SATURATION: 100 % | RESPIRATION RATE: 16 BRPM

## 2024-10-23 DIAGNOSIS — R63.30 FEEDING DIFFICULTIES: ICD-10-CM

## 2024-10-23 DIAGNOSIS — R63.30 FEEDING DIFFICULTIES: Primary | ICD-10-CM

## 2024-10-23 PROCEDURE — 250N000009 HC RX 250: Performed by: RADIOLOGY

## 2024-10-23 PROCEDURE — 49452 REPLACE G-J TUBE PERC: CPT

## 2024-10-23 PROCEDURE — C1769 GUIDE WIRE: HCPCS

## 2024-10-23 RX ADMIN — WATER: 100 IRRIGANT IRRIGATION at 13:10

## 2024-10-23 NOTE — IR NOTE
Patient Name: Sofie Rodriguez  Medical Record Number: [id number]  Today's Date: 10/23/2024    Procedure: IR GJ tube exchange  Proceduralist: Dr. Myrick    Procedure Start: 1322  Procedure end: 1326  Sedation medications administered: N/A  Sedation time: N/A    Other Notes: Pt arrived to IR room 1 from Pre/post bay 1. Consent reviewed. Pt denies any questions or concerns regarding procedure. Pt positioned supine and monitored per protocol. Pt tolerated procedure without any noted complications. Pt transferred back to Pre/post bay 1.    Discharge criteria met. Discharge instructions given and reviewed with patient. No further questions or concerns. Pt d/c'd home.

## 2024-10-23 NOTE — DISCHARGE INSTRUCTIONS
Gastrostomy (G) or Gastrojejunostomy (G/J) Tube Exchange Discharge Instructions:   You had a gastrostomy (G) tube or a Gastrojejunostomy Tube (GJ) exchanged.  This tube is often used for nutritional support and medication administration or it can be used for stomach venting.     Care instructions:  - If you received sedation for your procedure, do not drive or operate heavy machinery for the rest of the day.  - Avoid soaking in stagnant water (tub baths, Jacuzzis, pools, lake, or ocean).   - You may shower beginning the day after the tube was exchanged.   - Clean under the disc daily with soap and water. Pat dry under disc and apply new split gauze dressing under disc. Cleaning tube site daily will help prevent infection and skin irritation.  - A small amount of clear tan drainage from the tube exit site can be normal.  - Make sure the disc on the tube fits slightly snug against the skin so that the tube does not move in or out of the body easily.   - If you experience leaking from around tube exit site, the most common cause is that the disc is not tightened against the skin.   - To tighten the disc, pull gently on the tube so the retention balloon (under the skin) is pulled up against the skin under the tube. Push the disc down until it is tight against the skin without a gap between the skin and the disc.  - Flush your tube with 60cc of water twice a day (using a cath tip syringe) to prevent tube from clogging (or follow recommendations from your doctor or dietician if given).    Giving Feedings and Medications:  - Follow-up with your dietician, primary care provider, or oncologist for instructions on tube feedings and medication administration.    - ONLY use specific enteric feedings with your tube (unless otherwise discussed with your dietitian).    - Flush tube at least twice daily with 60ml of water using cath tip syringe unless otherwise instructed by your doctor or dietician.  - Flush the tube before and  after administrating medications and bolus feedings with 60cc of water.    Follow Up:  - Recommend routine 3 month exchanges of feeding tube. Please contact your primary care provider or speak with your dietitian to obtain an order to have your G/GJ tube exchanged. Then contact [facility]'s Medical Imaging scheduling department at [phone] to schedule your G/GJ tube exchange appointment.    Please seek medical evaluation for:  - Fever (greater than 101 F (38.3C).  - Purulent (yellow/green/foul smelling) drainage from tube exit site.   - Significant or worsening abdominal pain.   - Skin that is hot to the touch or significantly reddened at the tube exit site.   - Bleeding at tube exit site.    Call [place] IR RN Line at [phone] with questions or if you have any of the following symptoms:  - Tube falls out or felt to be out of position.  - Unable to flush tube.  - Significant leakage around tube site (tube feeding, medications or drainage).  - Significant bleeding at the tube exit site.  - Severe pain at tube exit site.

## 2024-10-28 ENCOUNTER — LAB (OUTPATIENT)
Dept: LAB | Facility: CLINIC | Age: 64
End: 2024-10-28
Payer: MEDICARE

## 2024-10-28 DIAGNOSIS — Z94.2 LUNG TRANSPLANT STATUS, BILATERAL (H): Primary | ICD-10-CM

## 2024-10-28 LAB
CYCLOSPORINE BLD LC/MS/MS-MCNC: 143 UG/L (ref 50–400)
TME LAST DOSE: NORMAL H
TME LAST DOSE: NORMAL H

## 2024-10-28 PROCEDURE — 80158 DRUG ASSAY CYCLOSPORINE: CPT | Performed by: PHYSICIAN ASSISTANT

## 2024-10-28 PROCEDURE — 36415 COLL VENOUS BLD VENIPUNCTURE: CPT | Performed by: PHYSICIAN ASSISTANT

## 2024-10-29 DIAGNOSIS — Z94.2 S/P LUNG TRANSPLANT (H): ICD-10-CM

## 2024-10-29 PROCEDURE — 99N0300 PR LEGACY PUMP COUNT: Mod: LEGACY

## 2024-10-29 NOTE — RESULT ENCOUNTER NOTE
Cyclosporine level 143 at 12 hours, on 10/27.  Goal 120-140.   Current dose 75 mg in AM, 55 mg in PM    No dose change at this time.   Recheck level in 2 weeks.     Discussed with patient via [employer] message.

## 2024-11-07 ENCOUNTER — LAB (OUTPATIENT)
Dept: LAB | Facility: CLINIC | Age: 64
End: 2024-11-07
Payer: MEDICARE

## 2024-11-08 PROCEDURE — 80158 DRUG ASSAY CYCLOSPORINE: CPT | Performed by: PHYSICIAN ASSISTANT

## 2024-11-09 LAB
CYCLOSPORINE BLD LC/MS/MS-MCNC: 142 UG/L (ref 50–400)
TME LAST DOSE: NORMAL H
TME LAST DOSE: NORMAL H

## 2024-11-11 NOTE — RESULT ENCOUNTER NOTE
Cyclosporine level 142 at 12 hours, on 11/8.  Goal 120-140.   Current dose 75 mg in AM, 75 mg in PM    No dose change at this time.  Recheck level in 1 month.     Discussed with patient via [employer] message.

## 2024-11-14 ENCOUNTER — HOME INFUSION (OUTPATIENT)
Dept: HOME HEALTH SERVICES | Facility: HOME HEALTH | Age: 64
End: 2024-11-14
Payer: MEDICARE

## 2024-11-18 ENCOUNTER — HOME INFUSION BILLING (OUTPATIENT)
Dept: HOME HEALTH SERVICES | Facility: HOME HEALTH | Age: 64
End: 2024-11-18
Payer: MEDICARE

## 2024-11-25 ENCOUNTER — MYC MEDICAL ADVICE (OUTPATIENT)
Dept: PULMONOLOGY | Facility: CLINIC | Age: 64
End: 2024-11-25
Payer: MEDICARE

## 2024-12-09 ENCOUNTER — MYC MEDICAL ADVICE (OUTPATIENT)
Dept: PULMONOLOGY | Facility: CLINIC | Age: 64
End: 2024-12-09
Payer: MEDICARE

## 2024-12-09 DIAGNOSIS — E03.9 HYPOTHYROIDISM, UNSPECIFIED TYPE: ICD-10-CM

## 2024-12-09 RX ORDER — LEVOTHYROXINE SODIUM 25 UG/1
25 TABLET ORAL DAILY
Qty: 30 TABLET | Refills: 11 | Status: SHIPPED | OUTPATIENT
Start: 2024-12-09 | End: 2024-12-11

## 2024-12-11 DIAGNOSIS — E03.9 HYPOTHYROIDISM, UNSPECIFIED TYPE: ICD-10-CM

## 2024-12-11 RX ORDER — LEVOTHYROXINE SODIUM 25 UG/1
25 TABLET ORAL DAILY
Qty: 30 TABLET | Refills: 11 | Status: SHIPPED | OUTPATIENT
Start: 2024-12-11

## 2024-12-13 PROCEDURE — 80158 DRUG ASSAY CYCLOSPORINE: CPT | Performed by: PHYSICIAN ASSISTANT

## 2024-12-14 ENCOUNTER — LAB (OUTPATIENT)
Dept: LAB | Facility: CLINIC | Age: 64
End: 2024-12-14
Payer: MEDICARE

## 2024-12-14 LAB
CYCLOSPORINE BLD LC/MS/MS-MCNC: 103 UG/L (ref 50–400)
TME LAST DOSE: NORMAL H
TME LAST DOSE: NORMAL H

## 2024-12-16 ENCOUNTER — TELEPHONE (OUTPATIENT)
Dept: TRANSPLANT | Facility: CLINIC | Age: 64
End: 2024-12-16
Payer: MEDICARE

## 2024-12-16 DIAGNOSIS — Z94.2 S/P LUNG TRANSPLANT (H): ICD-10-CM

## 2024-12-16 NOTE — TELEPHONE ENCOUNTER
Called patient to confirm she received plan for dose change for cyclosporine level of 103.  No answer left voicemail message.

## 2024-12-17 DIAGNOSIS — Z94.2 S/P LUNG TRANSPLANT (H): ICD-10-CM

## 2024-12-17 NOTE — RESULT ENCOUNTER NOTE
Pt reports the timing was off with her CSA draw last week. She will recheck labs Friday. Discussed via [employer] message.

## 2024-12-17 NOTE — LETTER
PHYSICIAN ORDERS      DATE & TIME ISSUED: 2024 10:29 AM  PATIENT NAME: Sofie Rodriguez   : 1960     [facility] MR# [if applicable]: [id number]     DIAGNOSIS:  Lung Transplant  Z94.2    Order for one time cyclosporine level the week of 2024.      Any questions please call: RIYA Rahman Transplant Coordinator: [phone]    Please fax these results to [phone].        Kaylin Triana PA-C

## 2024-12-17 NOTE — PROGRESS NOTES
Pt's venous blood gas elevated from recent labs, discussed w/Kaylin, results forwarded to [facility] to assess if her AVAPs settings need adjustment or if results are based on noncompliance w/use.    Writer faxed cyclosporine lab order to local lab for this week.

## 2024-12-23 ENCOUNTER — LAB (OUTPATIENT)
Dept: LAB | Facility: CLINIC | Age: 64
End: 2024-12-23
Payer: MEDICARE

## 2024-12-23 PROCEDURE — 80158 DRUG ASSAY CYCLOSPORINE: CPT | Performed by: PHYSICIAN ASSISTANT

## 2024-12-24 DIAGNOSIS — Z94.2 S/P LUNG TRANSPLANT (H): ICD-10-CM

## 2024-12-24 LAB
CYCLOSPORINE BLD LC/MS/MS-MCNC: 97 UG/L (ref 50–400)
TME LAST DOSE: NORMAL H
TME LAST DOSE: NORMAL H

## 2024-12-24 RX ORDER — CYCLOSPORINE 100 MG/1
100 CAPSULE, LIQUID FILLED ORAL DAILY
Qty: 30 CAPSULE | Refills: 11 | Status: SHIPPED | OUTPATIENT
Start: 2024-12-24

## 2024-12-24 RX ORDER — CYCLOSPORINE 25 MG/1
75 CAPSULE, LIQUID FILLED ORAL DAILY
Qty: 90 CAPSULE | Refills: 11 | Status: SHIPPED | OUTPATIENT
Start: 2024-12-24

## 2024-12-24 NOTE — RESULT ENCOUNTER NOTE
Cyclosporine level 97 at 12 hours, on 12/23.  Goal 120-140.   Current dose 75 mg in AM, 75 mg in PM    Dose changed to 75 mg in AM, 100 mg in PM   Recheck level in 1 week.    Discussed with patient via phone.

## 2025-01-01 DIAGNOSIS — Z94.2 S/P LUNG TRANSPLANT (H): ICD-10-CM

## 2025-01-02 ENCOUNTER — LAB (OUTPATIENT)
Dept: LAB | Facility: CLINIC | Age: 65
End: 2025-01-02
Payer: MEDICARE

## 2025-01-03 PROCEDURE — 80158 DRUG ASSAY CYCLOSPORINE: CPT | Performed by: PHYSICIAN ASSISTANT

## 2025-01-04 LAB
CYCLOSPORINE BLD LC/MS/MS-MCNC: 132 UG/L (ref 50–400)
TME LAST DOSE: NORMAL H
TME LAST DOSE: NORMAL H

## 2025-01-07 NOTE — RESULT ENCOUNTER NOTE
Cyclosporine level 132 at 12 hours, on 1/3.  Goal 120-140.   Current dose 75 mg in AM, 100 mg in PM    No dose change at this time.   Recheck level at [facility] transplant appt on 1/15.     Discussed with patient via [employer] message.

## 2025-01-12 NOTE — PROGRESS NOTES
[facility]  January 15, 2024         Assessment and Plan:   Sofie Rodriguez is a 64 year old female with h/o COPD s/p BSLT 6/28/22 with course complicated by post-operative hemidiaphragm palsy, recurrent PNAs, positive DSA, EBV viremia, hypogammaglobulinemia, severe gastroparesis s/p G/J tube placement and pyloric botox, GI bleed 2/2 pyloric ulcer, hemobilia s/p ERCP and MRCP, chronic diarrhea, recurrent C diff colitis, ESRD on iHD, recurrent falls,  deconditioning, FTT with prolonged admission 2/10-4/15/24 for failure to thrive and initiation of TPN/lipids, which was unsuccessful secondary to volume overload complicated by hypoxic and hypercapneic respiratory failure and encephalopathy. This is routine follow up.     1. Goals of care: today, Sofie admitted she has never use the NIPPV and has been lying about it since discharge last spring. She is not interested in coming into the hospital today despite extreme fatigue and inability to tolerate oral intake or TF. Her weight is down to 82 lbs and it is likely she she hypercapneic. In the past, I have offered to refer her to palliative care to discuss goals of care, which she has refused. We had another long conversation today that her current clinical status is not sustainable long term. She does want to continue with all care including HD. She states she now is willing to talk to Palliative re: goals and possibly help with GI symptom management.   - Encourage NIPPV nights and with naps, states she still has the machine and knows how to use it  - Will discuss prucalopride with Dr. Navarro  - Referral back to Palliative     2. Chronic hypoxic/hypercapneic respiratory failure:  S/p bilateral sequential lung transplant:   Suspected CARLEE:   ESLD: was sick over [place] with what sounds like possible influenza A. Did not call us or get tested. Had only a mild cough and denies new dyspnea. States she is very  fatigued. Sating 93% on room air in clinic. Notes she has never every used her NIPPV as prescribed and we discussed today how her pulmonary function is likely not robust enough to rid CO2. CMV 6/26 negative. CXR reviewed today and demonstrates stable transplant. PFTs were not valud today despite 8 attempts, very weak, but down significantly from prior.   - Advised patient again to wear her NIPPV and follow up with [facility]  - Continue IS including CSA (goal 120-140) and prednisone, increase to 10 mg daily  - AZA previously stopped 5/2023 secondary to ongoing GI issues and and EBV viremia  - Bactrim qMWF for PJP ppx     3. Positive DSA: [facility] treatment deferred given frailty and likely inabilitty to tolerate treatment  DSA has been elevated for multiple months, however were declining at last check in September.  - DSA and Prospera pending    4. FTT:  Severe protein calorie malnutrition:  Gastroparesis s/p PEG/J, botox, and G-POEM:  SB hypomotility:  Pyloric ulcer:  Chronic nausea and osmotic diarrhea:  SIBO s/p rifaximin:   Recurrent C diff colitis: chronic osmotic and infectious diarrhea since transplant with recurrent episodes of C diff.  Notable weight loss d/t diarrhea, GI dysmotility, and intolerance of enteral feeds, most recently on elemental formula. Extensive OP eval and f/u with GI. S/p port placement for TPN and lipids. Continued for ~5 weeks inpatient without considerable improvement, transitioned back to TF. Colonoscopy recommended by GI  but pt. deferred d/t risk for reintubation. Was up to tolerating 4 cans of TF at night with a weight of 88 lb at last visit. Now, for the last month, she has been unable to tolerate more than 1 can and is taking in minimal calories with ongoing nausea and diarrhea with any intake.   - Messaged Dr. Colon re: prucalopride recommendations  - Increase TF and protein packet as able    5. ESRD: no on dialysis T/Th/Sat. Was unable to tolerate the volume associated with TPN.     6.  A-flutter (recurrent on 3/17)  A-fib, intermittent  HTN  HFpEF: no current issues. Following with Cardiology.     RTC: 1 month  Vaccinations: covid and RSV completed; unsure on flu shot  Preventative: DEXA > June 2025; Derm is scheduled, states she is UTD with pap, needs to review mammogram; will discuss prior GI recommendations for colonoscopy at next visit    Kaylin Triana PA-C  Pulmonary, Allergy, Critical Care and Sleep Medicine        Interval History:     Starting on Felicia, patient has a fever to 101, headache, runny eyes/nose, slight cough and diarrhea. Lasted 3-4 days and has slowly recovering. Notes her BP has been high, was taken off some medications. No further fever, no sinus congestion or drainage, no cough, denies shortness of breath. No chest pain, denies palpitations. Was up to 4 cans for two months and then she started to feel sick again, has only done 1 can per day for the last month. Notes increased stomach aches and diarrhea really bad with the TF. Notes ongoing nausea with TF or eating, cannot even tolerate increasing to 2 cans. Eating only one microwave dinner per day. Notes she has never been using her NIPPV, was lying the whole time. Refuses hospitalization.           Review of Systems:   Please see HPI, otherwise the complete 10 point ROS is negative.           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    Hepatitis 2017    Hep C, [facility]    History of blood transfusion     HTN (hypertension)     Infectious mononucleosis     Lung infection 11/30/2022    Osteopenia      Past Surgical History:   Procedure Laterality Date    BRONCHOSCOPY (RIGID OR FLEXIBLE), DIAGNOSTIC N/A 08/02/2022    Procedure: BRONCHOSCOPY, DIAGNOSTIC- inspection Bronch;   Surgeon: Kamala Lovell MD;  Location:  GI    BRONCHOSCOPY (RIGID OR FLEXIBLE), DIAGNOSTIC N/A 09/13/2022    Procedure: INSPECTION BRONCHOSCOPY, WITH BRONCHOALVEOLAR LAVAGE;  Surgeon: Jose R Mccullough MD;  [facility]: [facility]    BRONCHOSCOPY (RIGID OR FLEXIBLE), DIAGNOSTIC N/A 11/09/2022    Procedure: BRONCHOSCOPY, WITH BRONCHOALVEOLAR LAVAGE AND BIOPSY;  Surgeon: Cesar Lima MD;  Location:  GI    BRONCHOSCOPY (RIGID OR FLEXIBLE), DIAGNOSTIC N/A 01/25/2023    Procedure: BRONCHOSCOPY, WITH BRONCHOALVEOLAR LAVAGE AND BIOPSY;  Surgeon: Mason Reddy MD;  Location:  GI    BRONCHOSCOPY (RIGID OR FLEXIBLE), DIAGNOSTIC N/A 04/19/2023    Procedure: BRONCHOSCOPY, WITH BRONCHOALVEOLAR LAVAGE AND BIOPSY;  Surgeon: Kamala Lovell MD;  Location:  GI    BRONCHOSCOPY (RIGID OR FLEXIBLE), DIAGNOSTIC N/A 07/12/2023    Procedure: BRONCHOSCOPY, WITH BRONCHOALVEOLAR LAVAGE AND BIOPSY;  Surgeon: Cesar Lima MD;  Location:  GI    BRONCHOSCOPY FLEXIBLE AND RIGID N/A 07/19/2022    Procedure: BRONCHOSCOPY inspection only;  Surgeon: Bob Liao MD;  Location:  GI    COLONOSCOPY  2015    CORONARY ANGIOGRAPHY ADULT ORDER      CV CORONARY ANGIOGRAM N/A 06/30/2021    Procedure: CV CORONARY ANGIOGRAM;  Surgeon: Alexander Cuellar MD;  Location:  HEART CARDIAC CATH LAB    CV RIGHT HEART CATH MEASUREMENTS RECORDED N/A 06/30/2021    Procedure: CV RIGHT HEART CATH;  Surgeon: Alexander Cuellar MD;  Location:  HEART CARDIAC CATH LAB    ENDOSCOPIC PERORAL MYOTOMY N/A 10/09/2023    Procedure: MYOTOMY, ESOPHAGUS, ENDOSCOPIC, ORAL APPROACH;  Surgeon: Gonzalez Navarro MD;  [facility]    ENDOSCOPIC RETROGRADE CHOLANGIOPANCREATOGRAM N/A 08/11/2022    Procedure: ENDOSCOPIC RETROGRADE CHOLANGIOPANCREATOGRAPHY WITH PANCREATIC DUCT NEEDLE KNIFE AND STENT PLACEMENT, BILE DUCT SPHINCTEROTOMY, BLOOD/DEBRIS REMOVAL AND STENT PLACEMENT;  Surgeon: Cosmo Arroyo MD;  Location:  OR    ENDOSCOPIC RETROGRADE  CHOLANGIOPANCREATOGRAM N/A 10/07/2022    Procedure: ENDOSCOPIC RETROGRADE CHOLANGIOPANCREATOGRAPHY with biliary and pancreatic stent removal, debris removal;  Surgeon: Cosmo Arroyo MD;  [facility]    ENT SURGERY  1974    tonsillectomy    ENTEROSCOPY SMALL BOWEL N/A 08/11/2022    Procedure: SMALL BOWEL ENTEROSCOPY;  Surgeon: Cosmo Arroyo MD;  [facility]    ESOPHAGOGASTRODUODENOSCOPY, WITH NASOGASTRIC TUBE INSERTION N/A 07/01/2022    Procedure: ESOPHAGOGASTRODUODENOSCOPY, WITH NASOJEJUNAL TUBE INSERTION;  Surgeon: Ozzy Nickerson MD;  Location:  GI    ESOPHAGOSCOPY, GASTROSCOPY, DUODENOSCOPY (EGD), COMBINED N/A 08/03/2022    Procedure: ESOPHAGOGASTRODUODENOSCOPY (EGD);  Surgeon: Ira Andres MD;  Location:  GI    ESOPHAGOSCOPY, GASTROSCOPY, DUODENOSCOPY (EGD), COMBINED N/A 01/25/2023    Procedure: ESOPHAGOGASTRODUODENOSCOPY (EGD) with botox injection;  Surgeon: Gonzalez Navarro MD;  Location:  GI    ESOPHAGOSCOPY, GASTROSCOPY, DUODENOSCOPY (EGD), COMBINED N/A 10/09/2023    Procedure: ESOPHAGOGASTRODUODENOSCOPY;  Surgeon: Gonzalez Navarro MD;  [facility]    HAND SURGERY      INSERT CHEST TUBE Right 09/13/2022    Procedure: Insert chest tube;  Surgeon: Jose R Mccullough MD;  [facility]: [facility]    IR CVC TUNNEL PLACEMENT > 5 YRS OF AGE  09/26/2022    IR CVC TUNNEL PLACEMENT > 5 YRS OF AGE  02/14/2024    IR CVC TUNNEL REMOVAL RIGHT  3/6/2024    IR GASTRO JEJUNOSTOMY TUBE CHANGE  08/31/2022    IR GASTRO JEJUNOSTOMY TUBE CHANGE  12/21/2022    IR GASTRO JEJUNOSTOMY TUBE CHANGE  07/12/2023    IR GASTRO JEJUNOSTOMY TUBE CHANGE  08/18/2023    IR GASTRO JEJUNOSTOMY TUBE CHANGE  11/14/2023    IR GASTRO JEJUNOSTOMY TUBE CHANGE  4/8/2024    IR GASTRO JEJUNOSTOMY TUBE CHANGE  10/23/2024    IR GASTRO JEJUNOSTOMY TUBE PLACEMENT  07/27/2022    IR THORACENTESIS  08/29/2022    LEEP TX, CERVICAL  04/07/2017    HECTOR III    LYMPH NODE BIOPSY Left 2005    Left axilla, benign- [place]     MIDLINE INSERTION - DOUBLE LUMEN Left 2022    20cm, Basilic vein    PICC DOUBLE LUMEN PLACEMENT Right 2024    5FR DL PICc, basiliv vein- L-36cm, 1cm out.    REPLACE GASTROJEJUNOSTOMY TUBE, PERCUTANEOUS  10/07/2022    Procedure: Replace Gastrojejunostomy Tube;  Surgeon: Cosmo Arroyo MD;  [facility]    THORACENTESIS Left 2022    Procedure: THORACENTESIS;  Surgeon: Bo Capone PA-C;  Location: [facility] OR    THORACENTESIS Left 2022    Procedure: Thoracentesis;  Surgeon: Jose R Mccullough MD;  [facility]    THROMBECTOMY UPPER EXTREMITY Left 2022    Procedure: LEFT RADIAL ARM THROMBECTOMY;  Surgeon: Christie Graham MD;  [facility]    TRANSPLANT LUNG RECIPIENT SINGLE X2 Bilateral 2022    Procedure: Clamshell Incision, Bilateral Sequential Lung Transplant, On Cardiopulmonary Bypass, Flexible Bronchoscopy;  Surgeon: Sue Sunshine MD;  [facility]           Family History:     No family history on file.         Social History:     Social History     Socioeconomic History    Marital status:      Spouse name: Not on file    Number of children: Not on file    Years of education: Not on file    Highest education level: Not on file   Occupational History    Not on file   Tobacco Use    Smoking status: Former     Current packs/day: 0.00     Average packs/day: 0.5 packs/day for 30.0 years (15.0 ttl pk-yrs)     Types: Cigarettes     Start date: 1990     Quit date: 2020     Years since quittin.1    Smokeless tobacco: Never   Substance and Sexual Activity    Alcohol use: Not Currently     Comment: Stopped drinking in     Drug use: Not Currently     Types: Marijuana, Methamphetamines     Comment: hx:marijuana and methamphetamine-quit both unsure ?  2-3 yrs ago    Sexual activity: Not on file   Other Topics Concern    Parent/sibling w/ CABG, MI or angioplasty before 65F 55M? Not Asked   Social History Narrative    Not on  file     Social Drivers of Health     Financial Resource Strain: Low Risk  (7/10/2024)    [facility]    Overall Financial Resource Strain (CARDIA)     Difficulty of Paying Living Expenses: Not very hard   Food Insecurity: No Food Insecurity (8/10/2024)    Received from [facility] [employer] [facility]    Hunger Vital Sign     Worried About Running Out of Food in the Last Year: Never true     Ran Out of Food in the Last Year: Never true   Transportation Needs: No Transportation Needs (8/10/2024)    Received from [facility] [employer] [facility]    Transportation Needs     In the past 12 months, has lack of transportation kept you from medical appointments, meetings, work, or from getting medicines or things needed for daily living?: No   Physical Activity: Insufficiently Active (7/10/2024)    Received from [facility] [employer] [facility]    Exercise Vital Sign     Days of Exercise per Week: 5 days     Minutes of Exercise per Session: 20 min   Stress: No Stress Concern Present (7/10/2024)    Received from [facility] [employer] [facility]    [place] of [employer] - Occupational Stress Questionnaire     Feeling of Stress : Not at all   Social Connections: Moderately Isolated (7/10/2024)    Received from [facility] [employer] [facility]    Social Connection and Isolation Panel [NHANES]     Frequency of Communication with Friends and Family: More than three times a week     Frequency of Social Gatherings with Friends and Family: Three times a week     Attends Voodoo Services: 1 to 4 times per year     Active Member of Clubs or Organizations: No     Attends Club or Organization Meetings: Never     Marital Status:    Interpersonal Safety: Not At Risk (11/6/2024)    [facility] [employer] [facility]    Intimate Partner Violence     Are you in a relationship where you are physically hurt, threatened and/or made to feel afraid?: No   Housing  Stability: Low Risk  (8/10/2024)    [facility]    Housing Stability Vital Sign     Unable to Pay for Housing in the Last Year: No     Number of Times Moved in the Last Year: 0     Homeless in the Last Year: No            Medications:     Current Outpatient Medications   Medication Sig Dispense Refill    atorvastatin (LIPITOR) 10 MG tablet Take 1 tablet (10 mg) by mouth daily.      predniSONE (DELTASONE) 5 MG tablet Place 2 tablets (10 mg) into J tube every morning.      acetaminophen (TYLENOL) 500 MG tablet 500-1,000 mg by Per J Tube route 3 times daily as needed for mild pain      B Complex-C-Folic Acid (DIALYVITE) TABS 1 tablet by Per J Tube route every morning 30 tablet 11    Biotin 2500 MCG CHEW Take 2,500 mcg by mouth daily      cycloSPORINE modified (GENERIC EQUIVALENT) 100 MG capsule Take 1 capsule (100 mg) by mouth daily. Total dose: 75 mg in the AM and 100 mg in the PM. 30 capsule 11    cycloSPORINE modified (GENERIC EQUIVALENT) 25 MG capsule Take 3 capsules (75 mg) by mouth daily. Total dose: 75 mg in the AM and 100 mg in the PM 90 capsule 11    [employer] Renal Support 1.8 PO LIQD Place 1,000 mLs into J tube daily. Infuse via pump  Water flush: 30ml 6x/day 75020 mL 10    [employer] Renal Support 1.8 PO LIQD Place 960 mLs into J tube daily. Infuse via pump  80ml/hr X 12 hrs/day  Water flush: 30ml q 4 hours or 6x/day 18943 mL 11    levothyroxine (SYNTHROID/LEVOTHROID) 25 MCG tablet Take 1 tablet (25 mcg) by mouth daily. 30 tablet 11    Lidocaine (LIDOCARE) 4 % Patch Place 1 patch onto the skin every 24 hours To prevent lidocaine toxicity, patient should be patch free for 12 hrs daily. 30 patch 3    lidocaine (LMX4) 4 % external cream Apply topically once as needed for mild pain.      loperamide (IMODIUM A-D) 2 MG tablet 1 tablet (2 mg) by Per J Tube route 4 times daily as needed for diarrhea      midodrine (PROAMATINE) 10 MG tablet Take 1 tablet (10 mg) by mouth 2 times  "daily as needed (for map less than 65 or sbp less than 100 on HD days) 30 tablet 0    multivitamin (CENTRUM SILVER) tablet Take 1 tablet by mouth daily      Prosource TF PO LIQD (PROSOURCE TF) Place 45 mLs into J tube 2 times daily. Infuse 2 packets via syringe each day 2700 mL 11    protein modular (PROSOURCE TF) LIQD 2 packets by Per Feeding Tube route daily      sevelamer carbonate, RENVELA, 0.8 GM PACK Packet Take 1 packet (0.8 g) by mouth 3 times daily (with meals) (Patient taking differently: Take 0.8 g by mouth 5 times daily)      sulfamethoxazole-trimethoprim (BACTRIM) 400-80 MG tablet Take 1 tablet by mouth three times a week. 30 tablet 2     No current facility-administered medications for this visit.            Physical Exam:   BP (!) 146/90 (BP Location: Right arm, Patient Position: Chair, Cuff Size: Adult Small)   Pulse 102   Ht 1.6 m (5' 3\")   Wt 37.5 kg (82 lb 11.2 oz)   SpO2 93%   BMI 14.65 kg/m      GENERAL: ill appearing, NAD  HEENT: NCAT, EOMI, no scleral icterus, oral mucosa moist and without lesions  Neck: no cervical or supraclavicular adenopathy  Lungs: moderate airflow, mainly clear  CV: RRR, S1S2, no murmurs noted  Abdomen: emaciated, normoactive BS  Lymph: no edema  Neuro: AAO X 3, CN 2-12 grossly intact  Psychiatric: normal affect, good eye contact  Skin: no rash, jaundice or lesions on limited exam; lanugo present         Data:   All laboratory and imaging data reviewed.      Recent Results (from the past week)   Comprehensive metabolic panel    Collection Time: 01/15/25 10:42 AM   Result Value Ref Range    Sodium 143 135 - 145 mmol/L    Potassium 3.5 3.4 - 5.3 mmol/L    Carbon Dioxide (CO2) 27 22 - 29 mmol/L    Anion Gap 10 7 - 15 mmol/L    Urea Nitrogen 21.9 8.0 - 23.0 mg/dL    Creatinine 4.04 (H) 0.51 - 0.95 mg/dL    GFR Estimate 12 (L) >60 mL/min/1.73m2    Calcium 10.2 8.8 - 10.4 mg/dL    Chloride 106 98 - 107 mmol/L    Glucose 99 70 - 99 mg/dL    Alkaline Phosphatase 87 40 - 150 " U/L    AST 22 0 - 45 U/L    ALT 13 0 - 50 U/L    Protein Total 7.0 6.4 - 8.3 g/dL    Albumin 3.8 3.5 - 5.2 g/dL    Bilirubin Total 0.2 <=1.2 mg/dL   CBC with platelets    Collection Time: 01/15/25 10:42 AM   Result Value Ref Range    WBC Count 6.8 4.0 - 11.0 10e3/uL    RBC Count 3.50 (L) 3.80 - 5.20 10e6/uL    Hemoglobin 11.5 (L) 11.7 - 15.7 g/dL    Hematocrit 38.6 35.0 - 47.0 %     (H) 78 - 100 fL    MCH 32.9 26.5 - 33.0 pg    MCHC 29.8 (L) 31.5 - 36.5 g/dL    RDW 14.0 10.0 - 15.0 %    Platelet Count 235 150 - 450 10e3/uL   Magnesium    Collection Time: 01/15/25 10:42 AM   Result Value Ref Range    Magnesium 2.7 (H) 1.7 - 2.3 mg/dL   Extra Purple Top Tube    Collection Time: 01/15/25 10:43 AM   Result Value Ref Range    Hold Specimen [facility]    General PFT Lab (Please always keep checked)    Collection Time: 01/15/25 10:46 AM   Result Value Ref Range    FVC-Pred 2.74 L    FVC-Pre 1.08 L    FVC-%Pred-Pre 39 %    FEV1-Pre 1.06 L    FEV1-%Pred-Pre 48 %    FEV1FVC-Pred 80 %    FEV1FVC-Pre 99 %    FEFMax-Pred 5.99 L/sec    FEFMax-Pre 3.30 L/sec    FEFMax-%Pred-Pre 54 %    FEF2575-Pred 2.00 L/sec    FEF2575-Pre 2.38 L/sec    VWF4798-%Pred-Pre 118 %    ExpTime-Pre 5.23 sec    FIFMax-Pre 3.20 L/sec    FEV1FEV6-Pred 80 %    FEV1FEV6-Pre 99 %     PFT interpretation:  Maneuver: valid and didn't meet ATS

## 2025-01-13 NOTE — PROGRESS NOTES
[facility]    Progress Note - Medicine Service, NILE TEAM 3       Date of Admission:  2/10/2024    Assessment & Plan   Sofie Rodriguez is a 63 year old female with PMH COPD s/p bilateral lung transplant 6/28/22 c/b hemidiaphragm palsy and recurrent pneumonias, gastroparesis and small bowel dysmotility complicated by severe malnutrition now s/p PEG/J, ESRD on M/W/F HD, recent R femoral fx s/p ORIF, chronic diarrhea, recurrent c-diff, failure to thrive with inability to tolerate any tube feeds, who was admitted to [facility] on 2/10/24 for concerns over malnutrition and TPN initiation via portacath. Course complicated by recurrent and progressive acute on chronic hypoxic and hypercarbic respiratory failure requiring multiple ICU admissions and intubation 2/18-2/19, 2/28-2/29, 3/18-3/20, for continuous BiPAP. Again with worsening respiratory acidosis and hypercarbia, concern for infection vs acute rejection, requiring transfer back to ICU 3/20/2024 for intubation and bronchoscopy. Weaned off ventilator to RA and BIPAP at night. Transferred to medicine on 4/4/2024.     Changes today:   - C. Diff and enteric panel negative  - Patient now does not want further colonoscopy   - Started imodium PRN for diarrhea  - Restart apixaban now that patient deferring colonoscopy  - Metabolic cart 4/5    # Acute on chronic hypoxic and hypercarbic respiratory failure requiring intubation 2/17-2/19 3/25-4/2, improved  # Recurrent PNAs, concern for acute infection vs acute rejection  # S/p BSLT 6/8/22 for COPD  # R hemidiaphragm palsy   # Positive DSA   Hx bilateral lung transplant on 6/28/2022 for COPD.  Initially admitted on 2/10/2024 for initiation of TPN.  However she was admitted to the ICU on 2/17/2024 for hypoxic hypercarbic respiratory failure.  She was intubated on 2/18 - 2/19 due to pulmonary edema vs infection.  Was extubated onto BiPAP/AVAPS with improvement in  mentation however continued to have respiratory acidosis.She was transferred to medicine floor on 2/29.  Was transferred back to the ICU on 3/18 - 3/20 due to hypercarbia and severe respiratory acidosis that did not require intubation.  Patient again experience worsening respiratory acidosis and hypercarbia will concern for infection VS acute rejection that required transfer back to ICU on 3/20/2024 for intubation and bronch.  She was weaned off the ventilator to room air and BiPAP at night.  Transferred to medicine on 4//2024.  She has had imaging to evaluate neurologic causes of decreased respiratory drive, without any notable findings.  Mentation remained stable despite low pH and high pCO2.  Volume status has improved with hemodialysis.  Overall her pCO2 retention is thought to be multifactorial, with a component of overfeeding through TPN, infection, bicarb loss with diarrhea, and NIPPV intolerance due to claustrophobia.  - Daily VBG, stable on 4/5/2024  - Transplant pulm following, appreciate recs  - Immunosuppression:   >Prednisone 5 mg every morning, 2.5 mg every afternoon  >Cyclosporine 100mg BID (Stopped tacrolimus 3/10)   >Overnight oximetry study suggestive of O2 vs CPAP need, as did require up to 2LPM overnight to prevent hypoxia  >Minimize O2 to preserve respiratory drive, given IVIG for DSA+   >D/c'd theophylline 3/3/24 due to difficulty attaining therapeutic levels (started by ICU), could consider Modafinil   >Repeat metabolic CART study ordered by Transplant Pulm on 4/5/2024  - Airway clearance/nebs:  --> resume BID scheduled if secretions worsen/thicken  - Xopenex neb BID PRN  - Mucomyst stopped 4/4   - Volume removal with iHD  - Limit medications that would depress respiration  - BIPAP at hs and naps at all times. Ideally 7-8 hours overnight, limited by claustrophobia, medications as below  - Zyprexa 5mg at bedtime   - Atarax 25 mg TID PRN for anxiety  - Clonidine 0.1 mg at bedtime  - High risk  for reintubation given hx of hypercarbia   - Will need bipap machine for home prior to discharge     # Goals of Care  - 3/15 Care conference on with Transplant Pulm, [facility] Care, Medicine, daughters (Julia Nash) and Transplant SW. Overall medical updates provided and Qs answered. With declining respiratory acidosis on labs, discussed code status 3/18. Patient initially not desiring any escalation of her care to ICU/intubation with frustration re overall course. However, after discussing with her daughter on the phone she and family elected to remain full code and agreed to ICU admission and intubation if necessary.   - 3/29 Care conference: Laid out a few options for family and patient to consider with qs answered. Examples being we could attempt to extubate to BiPAP but plan should be established prior to extubation that if patient decompensates and requires reintubation then likely pursue trach versus more comfort approach. Other option is going straight for trach now. Patient and family (daughter Julia and son present on Ipad) considering the options and had a lot of questions about trach and what that would look like long-term, which was laid out for the family and patient.   - Pt made the decision this hospitalization, that if needed down the line she would be acceptable of a tracheostomy      # Chronic osmotic diarrhea/SIBO s/p Rifaximin  # Recurrent C.Diff (3rd ep 3/12/24)    # Diarrhea  Recurrent C.diff 3/12, Fidaxomicin x 10 days (3/13-3/22), with improvement in diarrhea. Transplant pulm requesting repeat colonoscopy w/ Bx after completion of c.diff treatment, to r/o toxicity or other reason that diarrhea is present.  C.diff and enteric panel on 4/4/2024 was unremarkable.  Patient initially agreeable to colonoscopy, however later noted that she would like to defer colonoscopy for concerns regarding her weight and reintubation for the procedure.   - GI consult for recurrent diarrhea in the MICU, appreciate  recs   - C.diff and enteric panel negative  - Confirmed Osmotic diarrhea with stool studies--> Likely not infectious, continue adjusting TF and loperamide PRN  - Bowel regimen PRN  - Imodium PRN     # Severe malnutrition   # Failure to thrive  # Hypoalbuminemia  # Gastroparesis, small bowel dysmotility  # S/P PEG/J with intolerance of enteral nutrition  Patient with gastroparesis (presumed due to vagal injury) and small bowel dysmotility complicated by unintentional 40lb weight loss over the past year and now severe malnutrition. Previously intolerant to oral food intake due to nausea. Was initially admitted for portacath and TPN initiation since . Intermittently tolerating feeds without n/v from .  Per GI, no ongoing concerns for SIBO as of . As of 3/11 transplant pulmonology is worried that TPN is not a realistic plan to continue at home and transitioned back to TF (RD oncerned pt is not absorbing any oral intake). TPN discontinued 3/16. Transplant pulm also worried about mucosal toxicity.   - Metabolic cart 24 following 24 hours of calorie counts   - Nutrition consulted, appreciate assistance  - Continue TF at goal rate 35 ml/hr via PEG/J  - Speech saw pt 4/3 okay for regular diet with thin liquids; will continue pt tube feeds via pts g/j tube (hx of gastroparesis requiring TPN) until pt able to take in adequate PO nutrition            # Recurrent pneumonia, concern for acute infection vs rejection  # Recurrent C.Diff colitis (3rd ep 3/12/24)  # Hx EBV viremia   # Hypogammaglobulinemia  # Chronic immunosuppression / lung transplant  Empirically treated for pneumonia  - , RVP and cultures no growth to date. Recurrent C.Diff Positive 3/12, s/p PO Fidaxomicin 200 mg BID for 10 days (3/13-3/22) with improvement in diarrhea. Remains afebrile, leukocytosis resolved.  Ig, s/p IVIG.  EBV 27K (decreased compared to prior).     - Follow up BAL and blood cultures from  3/24    Antibiotics:  Zosyn (2/17 - 2/23, 3/24 - 4/2)   Bactrim (MWF, PJP ppx, previously on Dapsone)  Vancomycin (2/17 - 2/17, 3/24-3/25)  Micafungin (2/18- 2/21, 3/24 x1)  Fidaxomicin (3/13-3/22)     Micro:   - BAL 3/24:   - Cell count w diff: PMN 75%, lymphocytes 5, monocytes 19, eos 1  - No organisms seen on Gram stain  - RVP, HSV, Histoplasma neg  - Fungal & bacterial cultures NGTD  - EBV, CMV, PJP, Aspergillus, Legionella, Mycoplasma, AFB in process  - Bcx 3/24 NGTD    # A-flutter (recurrent on 3/17)  # A-fib, intermittent  # HTN  # HFpEF  Noted Afib/flutter intermittently throughout admission. Was started on amiodarone bolus with drip and transitioned to PO dosing.  2/17 TTE: LVEF 55-60%, global RV function normal, no significant valvular abnormalities. Briefly required levophed and midodrine for HD but otherwise stable off pressors.   - MAP goal > 65 mmHg   - Continue Metoprolol tartrate dose 25 mg BID (hold parameters if MAP<65 or hr less than 60, hold on dialysis days)  - Continue amiodarone 200 mg PO  - Midodrine with HD as needed  - Restarted Apixaban now that patient not wanting colonoscopy     # Hypothyroidism  Patient with new (2/17/24) low T4 at 0.64 and TSH at 6.5, concerning for new hypothyroidism vs sick thyroid syndrome. TSH with appropriate response, more consistent with elevation in the setting of acute illness. ICU team on 2/28 recommended initiation of treatment for possible hypothyroid as this can improve respiratory muscle function in the setting of weakness and malnutrition. 3/7, 3/15, 3/20 repeat thyroid function WNL.  - Continue liothyronine + levothyroxine -- note that prolonged combination therapy is not optimal & regimen should be re-evaluated (likely stop liothyronine, up-titrate levothyroxine) in post-acute setting    # Hx of Anxiety   # Claustrophobia  Reports claustrophobia contributing to limited compliance with BiPAP. Has seen health psych on 3/20, recommended grounding exercise  (5-4-3-2-1) for use on BiPAP.   - Zyprexa 5mg at bedtime   - Atarax 25 mg TID PRN for anxiety  - Clonidine 0.1 mg at bedtime    Chronic issues      # ESRD on HD TTS   # Mild hyperphosphatemia  Patient is ESRD on T/Th/Sat HD as outpt, now approx M/W/F inpatient. HD tolerating until 3/23. Briefly required extra Monday runs, not since being off TPN. She would prefer longer sessions to more days.  - Midodrine 10mg BID PRN with dialysis days   - Discuss with nephrology if there are additional strategies to optimize bicarb   - Sevelamer per Nephrology   - CBC and CMP daily  - Strict I/Os  - Daily weight   - Renally adjust medications     #Acute on chronic anemia 2/2 ESRD  Hgb stable, with no acute signs of bleeding.   - Daily CBC  - Transfuse for Hgb < 7  - Continue Epogen with dialysis, held in setting of concern for acute infection   - Continue Venofer 50 mcg qweek with dialysis, held in setting of concern for acute infection    # Suspected CARLEE  # PTA nocturnal O2, 2L   - Sleep clinic eval at discharge for suspected CARLEE    # GERD  - PPI BID    # Hyperglycemia, stress induced  Has been euglycemic for the past few days. Not on insulin.  - Hypoglycemia protocol     Diet: Regular Diet Adult  Adult Formula Drip Feeding: Mary Jane Stoll Renal Support; Jejunostomy; Goal Rate: 35 mL/hr (goal rate) held for 1 hour before/after Synthroid administration, per PharmD; mL/hr; Resume TF @ goal 35 ml/hr    DVT Prophylaxis: DOAC  Dong Catheter: Not present  Fluids: None  Lines: PRESENT      PICC 03/04/24 Double Lumen Right Basilic TPN. PICC okay to use.-Site Assessment: WDL  Hemodialysis Vascular Access Arteriovenous graft Superior Arm-Site Assessment: WDL;Bruit present;Thrill present      Cardiac Monitoring: ACTIVE order.    Code Status: Full Code      Clinically Significant Risk Factors              # Hypoalbuminemia: Lowest albumin = 2.6 g/dL at 2/18/2024  5:13 AM, will monitor as appropriate               # Severe  Malnutrition: based on nutrition assessment    # Financial/Environmental Concerns: none         Disposition Plan   Pending medical stability and scope with GI      The patient's care was discussed with the Attending Physician, Dr. Pantoja .    DELFIN LEDESMA MD  Medicine Service, [facility] TEAM [address] [place] [address]  Securely message with [employer] (more info)  Text page via [facility] Paging/Directory   See signed in provider for up to date coverage information  ______________________________________________________________________    Interval History   No acute events overnight.  Patient notes that she tolerated BiPAP well overnight, only making up once or twice to remove the BiPAP for most 10 minutes.  Denies any new chest pains or shortness of breath while off BiPAP this morning.  Denies any fevers or chills.  No abdominal pain.  Notes loose stools prior to falling asleep last night.  She notes this morning that she had thought further about getting a colonoscopy on 4/9/2024, and she reconsidered her decision to go ahead with a colonoscopy.  She would like to forego colonoscopy for now given concerns of her weight and also reintubation.  Discussed with her the rationale behind doing a colonoscopy and the need to find a reason for her diarrhea and how it is affecting her electrolytes and bicarb.  Despite this she continued to defer colonoscopy.  She understood the risks of not undergoing further evaluation.    Physical Exam   Vital Signs: Temp: 98.1  F (36.7  C) Temp src: Oral BP: (!) 146/57 Pulse: 71   Resp: 24 SpO2: 97 % O2 Device: None (Room air)    Weight: 84 lbs 14.03 oz    General: Awake, alert & oriented. Frail appearing  HEENT: Mucous membranes moist  Neuro: Awake and alert, no focal deficits, moving all extremities to command and spontaneously  Pulm/Resp: Clear breath sounds bilaterally, diminished on R>L, no accessory muscle use  CV: RRR, S1/S2 without m/r/g; pedal pulses 2+ without  LE edema  Abdomen: Soft, non-distended, non-tender  : Rectal tube in place, (+) bruit/thrill in LUE fistula  Incisions/Skin: PICC and PEG site without surrounding erythema, no rashes noted    Medical Decision Making       Please see A&P for additional details of medical decision making.      Data   ------------------------- PAST 24 HR DATA REVIEWED -----------------------------------------------    I have personally reviewed the following data over the past 24 hrs:    6.5  \   10.5 (L)   / 289     142 103 30.0 (H) /  108 (H)   3.4 28 2.87 (H) \     ALT: 12 AST: 15 AP: 103 TBILI: 0.2   ALB: 3.8 TOT PROTEIN: 6.1 (L) LIPASE: N/A     INR:  1.10 PTT:  N/A   D-dimer:  N/A Fibrinogen:  N/A       Imaging results reviewed over the past 24 hrs:   No results found for this or any previous visit (from the past 24 hour(s)).   done

## 2025-01-14 NOTE — PROGRESS NOTES
Transplant Coordinator Note    Reason for visit: Post lung transplant follow up visit   Coordinator: Present   Caregiver: None present.     Health concerns addressed today:  1. Felt sick around [place], febrile to 101, did not call transplant clinic. Lasted 3-4 days.   2. Pt is off amiodarone and BP meds, 140-160, pulse is rarely under 100. Will defer to nephrology to manage.   3. Rare cough, no shortness of breath.   4. Stopped doing 4 cans of enteral feedings about 1 month ago, doing 1 can/day. Bad stomach aches and diarrhea, feels better if she doesn't eat. One microwave dinner per day. Feels nauseous w/PO and TF intake.   5. Wasn't using her AVAPS device, ever, has been lying to the team and DME provider about it.   6. Patient does not want to admit to the hospital, agreeable today to palliative care referral.     Activity/rehab: Not exercising, quite fatigued.  Oxygen needs: Does not wear AVAPs, ever.   Pain management/RX: NA, lidocaine patches.  Next Bronch due: prn    COVID:  COVID-19 infection (yes/no, date of most recent positive test):   Status/instructions given about COVID-19 vaccine:     Pt Education: medications (use/dose/side effects), how/when to call coordinator, frequency of labs, s/s of infection/rejection, call prior to starting any new medications, lab/vital sign book    Health Maintenance:   Last colonoscopy:   Next colonoscopy due:   Dermatology:  Vaccinations this visit:     Labs, CXR, PFTs reviewed with patient  Medication record reviewed and reconciled  Questions and concerns addressed    Patient Instructions  1. Continue to hydrate with 60-70 oz fluids daily.  2. Continue to exercise daily or most days of the week.  3. Follow up with your primary care provider for annual gender health maintenance procedures.  4. Follow up with colonoscopy schedule.  5. Follow up with annual dermatology visits.  6. It doesn't seem like the COVID vaccine is working well in lung transplant patients. A  number of lung transplant patients have gotten sick with COVID even after receiving the vaccines. Based on our recent experience, it can be life-threatening to get COVID  even after being vaccinated. Please continue to act like you did not get the COVID vaccine - social distancing, wearing a mask, good hand hygiene, etc. If the people around you are vaccinated, it will help reduce the risk of you getting COVID. All members of your household should be vaccinated.  7. Kaylin will reach out to Dr. Navarro regarding possibly starting prucalopride. We will reach out to Yung, transplant pharmacist for him to review as well.   8. PFTs in 3-4 weeks.   9. We continue to recommend using the AVAPs machine with every time you sleep, naps included.   10. We will place a referral for palliative care.   11. We will increase your prednisone to 10 mg daily, we will reassess this when we follow up with you in a month.   12. Try to get the two packets of protein daily.     Next transplant clinic appointment: 1 month with CXR, labs and PFTs  Next lab draw: pending labs today.     AVS printed at time of check out

## 2025-01-15 ENCOUNTER — OFFICE VISIT (OUTPATIENT)
Dept: PULMONOLOGY | Facility: CLINIC | Age: 65
End: 2025-01-15
Attending: PHYSICIAN ASSISTANT
Payer: MEDICARE

## 2025-01-15 ENCOUNTER — LAB (OUTPATIENT)
Dept: LAB | Facility: CLINIC | Age: 65
End: 2025-01-15
Attending: PHYSICIAN ASSISTANT
Payer: MEDICARE

## 2025-01-15 ENCOUNTER — ANCILLARY PROCEDURE (OUTPATIENT)
Dept: GENERAL RADIOLOGY | Facility: CLINIC | Age: 65
End: 2025-01-15
Attending: PHYSICIAN ASSISTANT
Payer: MEDICARE

## 2025-01-15 VITALS
SYSTOLIC BLOOD PRESSURE: 146 MMHG | BODY MASS INDEX: 14.65 KG/M2 | WEIGHT: 82.7 LBS | HEART RATE: 102 BPM | HEIGHT: 63 IN | DIASTOLIC BLOOD PRESSURE: 90 MMHG | OXYGEN SATURATION: 93 %

## 2025-01-15 DIAGNOSIS — Z94.2 S/P LUNG TRANSPLANT (H): ICD-10-CM

## 2025-01-15 DIAGNOSIS — Z94.2 LUNG TRANSPLANT STATUS, BILATERAL (H): ICD-10-CM

## 2025-01-15 DIAGNOSIS — Z94.2 LUNG REPLACED BY TRANSPLANT (H): ICD-10-CM

## 2025-01-15 DIAGNOSIS — Z00.6 RESEARCH STUDY PATIENT: ICD-10-CM

## 2025-01-15 LAB
ALBUMIN SERPL BCG-MCNC: 3.8 G/DL (ref 3.5–5.2)
ALP SERPL-CCNC: 87 U/L (ref 40–150)
ALT SERPL W P-5'-P-CCNC: 13 U/L (ref 0–50)
ANION GAP SERPL CALCULATED.3IONS-SCNC: 10 MMOL/L (ref 7–15)
AST SERPL W P-5'-P-CCNC: 22 U/L (ref 0–45)
BILIRUB SERPL-MCNC: 0.2 MG/DL
BUN SERPL-MCNC: 21.9 MG/DL (ref 8–23)
CALCIUM SERPL-MCNC: 10.2 MG/DL (ref 8.8–10.4)
CHLORIDE SERPL-SCNC: 106 MMOL/L (ref 98–107)
CMV DNA SPEC NAA+PROBE-ACNC: NOT DETECTED IU/ML
CREAT SERPL-MCNC: 4.04 MG/DL (ref 0.51–0.95)
CYCLOSPORINE BLD LC/MS/MS-MCNC: 122 UG/L (ref 50–400)
EGFRCR SERPLBLD CKD-EPI 2021: 12 ML/MIN/1.73M2
ERYTHROCYTE [DISTWIDTH] IN BLOOD BY AUTOMATED COUNT: 14 % (ref 10–15)
EXPTIME-PRE: 5.23 SEC
FEF2575-%PRED-PRE: 118 %
FEF2575-PRE: 2.38 L/SEC
FEF2575-PRED: 2 L/SEC
FEFMAX-%PRED-PRE: 54 %
FEFMAX-PRE: 3.3 L/SEC
FEFMAX-PRED: 5.99 L/SEC
FEV1-%PRED-PRE: 48 %
FEV1-PRE: 1.06 L
FEV1FEV6-PRE: 99 %
FEV1FEV6-PRED: 80 %
FEV1FVC-PRE: 99 %
FEV1FVC-PRED: 80 %
FIFMAX-PRE: 3.2 L/SEC
FVC-%PRED-PRE: 39 %
FVC-PRE: 1.08 L
FVC-PRED: 2.74 L
GLUCOSE SERPL-MCNC: 99 MG/DL (ref 70–99)
HCO3 SERPL-SCNC: 27 MMOL/L (ref 22–29)
HCT VFR BLD AUTO: 38.6 % (ref 35–47)
HGB BLD-MCNC: 11.5 G/DL (ref 11.7–15.7)
HOLD SPECIMEN: NORMAL
MAGNESIUM SERPL-MCNC: 2.7 MG/DL (ref 1.7–2.3)
MCH RBC QN AUTO: 32.9 PG (ref 26.5–33)
MCHC RBC AUTO-ENTMCNC: 29.8 G/DL (ref 31.5–36.5)
MCV RBC AUTO: 110 FL (ref 78–100)
PLATELET # BLD AUTO: 235 10E3/UL (ref 150–450)
POTASSIUM SERPL-SCNC: 3.5 MMOL/L (ref 3.4–5.3)
PROT SERPL-MCNC: 7 G/DL (ref 6.4–8.3)
RBC # BLD AUTO: 3.5 10E6/UL (ref 3.8–5.2)
SODIUM SERPL-SCNC: 143 MMOL/L (ref 135–145)
SPECIMEN TYPE: NORMAL
TME LAST DOSE: NORMAL H
TME LAST DOSE: NORMAL H
WBC # BLD AUTO: 6.8 10E3/UL (ref 4–11)

## 2025-01-15 PROCEDURE — 85027 COMPLETE CBC AUTOMATED: CPT | Performed by: PATHOLOGY

## 2025-01-15 PROCEDURE — 80158 DRUG ASSAY CYCLOSPORINE: CPT | Performed by: PHYSICIAN ASSISTANT

## 2025-01-15 PROCEDURE — 83735 ASSAY OF MAGNESIUM: CPT | Performed by: PATHOLOGY

## 2025-01-15 PROCEDURE — 36415 COLL VENOUS BLD VENIPUNCTURE: CPT | Performed by: PATHOLOGY

## 2025-01-15 PROCEDURE — 94375 RESPIRATORY FLOW VOLUME LOOP: CPT | Performed by: PHYSICIAN ASSISTANT

## 2025-01-15 PROCEDURE — 71046 X-RAY EXAM CHEST 2 VIEWS: CPT | Mod: GC | Performed by: STUDENT IN AN ORGANIZED HEALTH CARE EDUCATION/TRAINING PROGRAM

## 2025-01-15 PROCEDURE — G0463 HOSPITAL OUTPT CLINIC VISIT: HCPCS | Performed by: PHYSICIAN ASSISTANT

## 2025-01-15 PROCEDURE — 99215 OFFICE O/P EST HI 40 MIN: CPT | Mod: 25 | Performed by: PHYSICIAN ASSISTANT

## 2025-01-15 PROCEDURE — 80053 COMPREHEN METABOLIC PANEL: CPT | Performed by: PATHOLOGY

## 2025-01-15 PROCEDURE — 99000 SPECIMEN HANDLING OFFICE-LAB: CPT | Performed by: PATHOLOGY

## 2025-01-15 RX ORDER — ATORVASTATIN CALCIUM 10 MG/1
10 TABLET, FILM COATED ORAL DAILY
Status: SHIPPED
Start: 2025-01-15

## 2025-01-15 RX ORDER — PREDNISONE 5 MG/1
10 TABLET ORAL EVERY MORNING
Status: SHIPPED
Start: 2025-01-15

## 2025-01-15 ASSESSMENT — PAIN SCALES - GENERAL: PAINLEVEL_OUTOF10: NO PAIN (0)

## 2025-01-15 NOTE — LETTER
1/15/2025      Sofie Rodriguez  [address] [place] [address]      Dear Colleague,    Thank you for referring your patient, Sofie Rodriguez, to the [facility] [place]. Please see a copy of my visit note below.    [facility]  January 15, 2024         Assessment and Plan:   Sofie Rodriguez is a 64 year old female with h/o COPD s/p BSLT 6/28/22 with course complicated by post-operative hemidiaphragm palsy, recurrent PNAs, positive DSA, EBV viremia, hypogammaglobulinemia, severe gastroparesis s/p G/J tube placement and pyloric botox, GI bleed 2/2 pyloric ulcer, hemobilia s/p ERCP and MRCP, chronic diarrhea, recurrent C diff colitis, ESRD on iHD, recurrent falls,  deconditioning, FTT with prolonged admission 2/10-4/15/24 for failure to thrive and initiation of TPN/lipids, which was unsuccessful secondary to volume overload complicated by hypoxic and hypercapneic respiratory failure and encephalopathy. This is routine follow up.     1. Goals of care: today, Sofie admitted she has never use the NIPPV and has been lying about it since discharge last spring. She is not interested in coming into the hospital today despite extreme fatigue and inability to tolerate oral intake or TF. Her weight is down to 82 lbs and it is likely she she hypercapneic. In the past, I have offered to refer her to palliative care to discuss goals of care, which she has refused. We had another long conversation today that her current clinical status is not sustainable long term. She does want to continue with all care including HD. She states she now is willing to talk to Palliative re: goals and possibly help with GI symptom management.   - Encourage NIPPV nights and with naps, states she still has the machine and knows how to use it  - Will discuss prucalopride with Dr. Navarro  - Referral back to Palliative     2. Chronic  hypoxic/hypercapneic respiratory failure:  S/p bilateral sequential lung transplant:   Suspected CARLEE:   ESLD: was sick over Felicia with what sounds like possible influenza A. Did not call us or get tested. Had only a mild cough and denies new dyspnea. States she is very fatigued. Sating 93% on room air in clinic. Notes she has never every used her NIPPV as prescribed and we discussed today how her pulmonary function is likely not robust enough to rid CO2. CMV 6/26 negative. CXR reviewed today and demonstrates stable transplant. PFTs were not valud today despite 8 attempts, very weak, but down significantly from prior.   - Advised patient again to wear her NIPPV and follow up with [facility]  - Continue IS including CSA (goal 120-140) and prednisone, increase to 10 mg daily  - AZA previously stopped 5/2023 secondary to ongoing GI issues and and EBV viremia  - Bactrim qMWF for PJP ppx     3. Positive DSA: [facility] treatment deferred given frailty and likely inabilitty to tolerate treatment  DSA has been elevated for multiple months, however were declining at last check in September.  - DSA and Prospera pending    4. FTT:  Severe protein calorie malnutrition:  Gastroparesis s/p PEG/J, botox, and G-POEM:  SB hypomotility:  Pyloric ulcer:  Chronic nausea and osmotic diarrhea:  SIBO s/p rifaximin:   Recurrent C diff colitis: chronic osmotic and infectious diarrhea since transplant with recurrent episodes of C diff.  Notable weight loss d/t diarrhea, GI dysmotility, and intolerance of enteral feeds, most recently on elemental formula. Extensive OP eval and f/u with GI. S/p port placement for TPN and lipids. Continued for ~5 weeks inpatient without considerable improvement, transitioned back to TF. Colonoscopy recommended by GI  but pt. deferred d/t risk for reintubation. Was up to tolerating 4 cans of TF at night with a weight of 88 lb at last visit. Now, for the last month, she has been unable to tolerate more than 1 can and is  taking in minimal calories with ongoing nausea and diarrhea with any intake.   - Messaged Dr. Colon re: prucalopride recommendations  - Increase TF and protein packet as able    5. ESRD: no on dialysis T/Th/Sat. Was unable to tolerate the volume associated with TPN.     6. A-flutter (recurrent on 3/17)  A-fib, intermittent  HTN  HFpEF: no current issues. Following with Cardiology.     RTC: 1 month  Vaccinations: covid and RSV completed; unsure on flu shot  Preventative: DEXA > June 2025; Derm is scheduled, states she is UTD with pap, needs to review mammogram; will discuss prior GI recommendations for colonoscopy at next visit    Kaylin Triana PA-C  Pulmonary, Allergy, Critical Care and Sleep Medicine        Interval History:     Starting on Felicia, patient has a fever to 101, headache, runny eyes/nose, slight cough and diarrhea. Lasted 3-4 days and has slowly recovering. Notes her BP has been high, was taken off some medications. No further fever, no sinus congestion or drainage, no cough, denies shortness of breath. No chest pain, denies palpitations. Was up to 4 cans for two months and then she started to feel sick again, has only done 1 can per day for the last month. Notes increased stomach aches and diarrhea really bad with the TF. Notes ongoing nausea with TF or eating, cannot even tolerate increasing to 2 cans. Eating only one microwave dinner per day. Notes she has never been using her NIPPV, was lying the whole time. Refuses hospitalization.           Review of Systems:   Please see HPI, otherwise the complete 10 point ROS is negative.           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     Hepatitis 2017    Hep C, [facility]     History of blood  transfusion      HTN (hypertension)      Infectious mononucleosis      Lung infection 11/30/2022     Osteopenia      Past Surgical History:   Procedure Laterality Date     BRONCHOSCOPY (RIGID OR FLEXIBLE), DIAGNOSTIC N/A 08/02/2022    Procedure: BRONCHOSCOPY, DIAGNOSTIC- inspection Bronch;  Surgeon: Kamala Lovell MD;  [facility]     BRONCHOSCOPY (RIGID OR FLEXIBLE), DIAGNOSTIC N/A 09/13/2022    Procedure: INSPECTION BRONCHOSCOPY, WITH BRONCHOALVEOLAR LAVAGE;  Surgeon: Jose R Mccullough MD;  [facility]     BRONCHOSCOPY (RIGID OR FLEXIBLE), DIAGNOSTIC N/A 11/09/2022    Procedure: BRONCHOSCOPY, WITH BRONCHOALVEOLAR LAVAGE AND BIOPSY;  Surgeon: Cesar Lima MD;  [facility]     BRONCHOSCOPY (RIGID OR FLEXIBLE), DIAGNOSTIC N/A 01/25/2023    Procedure: BRONCHOSCOPY, WITH BRONCHOALVEOLAR LAVAGE AND BIOPSY;  Surgeon: Mason Reddy MD;  [facility]     BRONCHOSCOPY (RIGID OR FLEXIBLE), DIAGNOSTIC N/A 04/19/2023    Procedure: BRONCHOSCOPY, WITH BRONCHOALVEOLAR LAVAGE AND BIOPSY;  Surgeon: Kamala Lovell MD;  [facility]     BRONCHOSCOPY (RIGID OR FLEXIBLE), DIAGNOSTIC N/A 07/12/2023    Procedure: BRONCHOSCOPY, WITH BRONCHOALVEOLAR LAVAGE AND BIOPSY;  Surgeon: Cesar Lima MD;  [facility]     BRONCHOSCOPY FLEXIBLE AND RIGID N/A 07/19/2022    Procedure: BRONCHOSCOPY inspection only;  Surgeon: Bob Liao MD;  [facility]     COLONOSCOPY  2015     CORONARY ANGIOGRAPHY ADULT ORDER       CV CORONARY ANGIOGRAM N/A 06/30/2021    Procedure: CV CORONARY ANGIOGRAM;  Surgeon: Alexander Cuellar MD;  Location:  HEART CARDIAC CATH LAB     CV RIGHT HEART CATH MEASUREMENTS RECORDED N/A 06/30/2021    Procedure: CV RIGHT HEART CATH;  Surgeon: Alexander Cuellar MD;  Location:  HEART CARDIAC CATH LAB     ENDOSCOPIC PERORAL MYOTOMY N/A 10/09/2023    Procedure: MYOTOMY, ESOPHAGUS, ENDOSCOPIC, ORAL APPROACH;  Surgeon: Gonzalez Navarro MD;  [facility]     ENDOSCOPIC RETROGRADE  CHOLANGIOPANCREATOGRAM N/A 08/11/2022    Procedure: ENDOSCOPIC RETROGRADE CHOLANGIOPANCREATOGRAPHY WITH PANCREATIC DUCT NEEDLE KNIFE AND STENT PLACEMENT, BILE DUCT SPHINCTEROTOMY, BLOOD/DEBRIS REMOVAL AND STENT PLACEMENT;  Surgeon: Cosmo Arroyo MD;  [facility]     ENDOSCOPIC RETROGRADE CHOLANGIOPANCREATOGRAM N/A 10/07/2022    Procedure: ENDOSCOPIC RETROGRADE CHOLANGIOPANCREATOGRAPHY with biliary and pancreatic stent removal, debris removal;  Surgeon: Cosmo Arroyo MD;  [facility]     ENT SURGERY  1974    tonsillectomy     ENTEROSCOPY SMALL BOWEL N/A 08/11/2022    Procedure: SMALL BOWEL ENTEROSCOPY;  Surgeon: Cosmo Arroyo MD;  [facility]     ESOPHAGOGASTRODUODENOSCOPY, WITH NASOGASTRIC TUBE INSERTION N/A 07/01/2022    Procedure: ESOPHAGOGASTRODUODENOSCOPY, WITH NASOJEJUNAL TUBE INSERTION;  Surgeon: Ozzy Nickerson MD;  [facility]     ESOPHAGOSCOPY, GASTROSCOPY, DUODENOSCOPY (EGD), COMBINED N/A 08/03/2022    Procedure: ESOPHAGOGASTRODUODENOSCOPY (EGD);  Surgeon: Ira Andres MD;  [facility]     ESOPHAGOSCOPY, GASTROSCOPY, DUODENOSCOPY (EGD), COMBINED N/A 01/25/2023    Procedure: ESOPHAGOGASTRODUODENOSCOPY (EGD) with botox injection;  Surgeon: Gonzalez Navarro MD;  [facility]     ESOPHAGOSCOPY, GASTROSCOPY, DUODENOSCOPY (EGD), COMBINED N/A 10/09/2023    Procedure: ESOPHAGOGASTRODUODENOSCOPY;  Surgeon: Gonzalez Navarro MD;  [facility]     HAND SURGERY       INSERT CHEST TUBE Right 09/13/2022    Procedure: Insert chest tube;  Surgeon: Jose R Mccullough MD;  [facility]     IR CVC TUNNEL PLACEMENT > 5 YRS OF AGE  09/26/2022     IR CVC TUNNEL PLACEMENT > 5 YRS OF AGE  02/14/2024     IR CVC TUNNEL REMOVAL RIGHT  3/6/2024     IR GASTRO JEJUNOSTOMY TUBE CHANGE  08/31/2022     IR GASTRO JEJUNOSTOMY TUBE CHANGE  12/21/2022     IR GASTRO JEJUNOSTOMY TUBE CHANGE  07/12/2023     IR GASTRO JEJUNOSTOMY TUBE CHANGE  08/18/2023     IR GASTRO  JEJUNOSTOMY TUBE CHANGE  2023     IR GASTRO JEJUNOSTOMY TUBE CHANGE  2024     IR GASTRO JEJUNOSTOMY TUBE CHANGE  10/23/2024     IR GASTRO JEJUNOSTOMY TUBE PLACEMENT  2022     IR THORACENTESIS  2022     LEEP TX, CERVICAL  2017    HECTOR III     LYMPH NODE BIOPSY Left     Left axilla, benign- [place]     MIDLINE INSERTION - DOUBLE LUMEN Left 2022    20cm, Basilic vein     PICC DOUBLE LUMEN PLACEMENT Right 2024    5FR DL PICc, basiliv vein- L-36cm, 1cm out.     REPLACE GASTROJEJUNOSTOMY TUBE, PERCUTANEOUS  10/07/2022    Procedure: Replace Gastrojejunostomy Tube;  Surgeon: Cosmo Arroyo MD;  [facility]     THORACENTESIS Left 2022    Procedure: THORACENTESIS;  Surgeon: Bo Capone PA-C;  Location: [facility]     THORACENTESIS Left 2022    Procedure: Thoracentesis;  Surgeon: Jose R Mccullough MD;  [facility]     THROMBECTOMY UPPER EXTREMITY Left 2022    Procedure: LEFT RADIAL ARM THROMBECTOMY;  Surgeon: Christie Graham MD;  [facility]     TRANSPLANT LUNG RECIPIENT SINGLE X2 Bilateral 2022    Procedure: Clamshell Incision, Bilateral Sequential Lung Transplant, On Cardiopulmonary Bypass, Flexible Bronchoscopy;  Surgeon: Sue Sunshine MD;  [facility]           Family History:     No family history on file.         Social History:     Social History     Socioeconomic History     Marital status:      Spouse name: Not on file     Number of children: Not on file     Years of education: Not on file     Highest education level: Not on file   Occupational History     Not on file   Tobacco Use     Smoking status: Former     Current packs/day: 0.00     Average packs/day: 0.5 packs/day for 30.0 years (15.0 ttl pk-yrs)     Types: Cigarettes     Start date: 1990     Quit date: 2020     Years since quittin.1     Smokeless tobacco: Never   Substance and Sexual Activity     Alcohol use: Not Currently  n/a     Comment: Stopped drinking in 2020     Drug use: Not Currently     Types: Marijuana, Methamphetamines     Comment: hx:marijuana and methamphetamine-quit both unsure ?  2-3 yrs ago     Sexual activity: Not on file   Other Topics Concern     Parent/sibling w/ CABG, MI or angioplasty before 65F 55M? Not Asked   Social History Narrative     Not on file     Social Drivers of Health     Financial Resource Strain: Low Risk  (7/10/2024)    [facility]    Overall Financial Resource Strain (CARDIA)      Difficulty of Paying Living Expenses: Not very hard   Food Insecurity: No Food Insecurity (8/10/2024)    [facility]    Hunger Vital Sign      Worried About Running Out of Food in the Last Year: Never true      Ran Out of Food in the Last Year: Never true   Transportation Needs: No Transportation Needs (8/10/2024)    [facility]    Transportation Needs      In the past 12 months, has lack of transportation kept you from medical appointments, meetings, work, or from getting medicines or things needed for daily living?: No   Physical Activity: Insufficiently Active (7/10/2024)    [facility]    Exercise Vital Sign      Days of Exercise per Week: 5 days      Minutes of Exercise per Session: 20 min   Stress: No Stress Concern Present (7/10/2024)    [facility]    [place] [employer] - Occupational Stress Questionnaire      Feeling of Stress : Not at all   Social Connections: Moderately Isolated (7/10/2024)    [facility]    Social Connection and Isolation Panel [NHANES]      Frequency of Communication with Friends and Family: More than three times a week      Frequency of Social Gatherings with Friends and Family: Three times a week      Attends Yarsanism Services: 1 to 4 times per year      Active  Member of Clubs or Organizations: No      Attends Club or Organization Meetings: Never      Marital Status:    Interpersonal Safety: Not At Risk (11/6/2024)    Received from [employer] [facility]    Intimate Partner Violence      Are you in a relationship where you are physically hurt, threatened and/or made to feel afraid?: No   Housing Stability: Low Risk  (8/10/2024)    [facility] from [employer] [facility]    Housing Stability Vital Sign      Unable to Pay for Housing in the Last Year: No      Number of Times Moved in the Last Year: 0      Homeless in the Last Year: No            Medications:     Current Outpatient Medications   Medication Sig Dispense Refill     atorvastatin (LIPITOR) 10 MG tablet Take 1 tablet (10 mg) by mouth daily.       predniSONE (DELTASONE) 5 MG tablet Place 2 tablets (10 mg) into J tube every morning.       acetaminophen (TYLENOL) 500 MG tablet 500-1,000 mg by Per J Tube route 3 times daily as needed for mild pain       B Complex-C-Folic Acid (DIALYVITE) TABS 1 tablet by Per J Tube route every morning 30 tablet 11     Biotin 2500 MCG CHEW Take 2,500 mcg by mouth daily       cycloSPORINE modified (GENERIC EQUIVALENT) 100 MG capsule Take 1 capsule (100 mg) by mouth daily. Total dose: 75 mg in the AM and 100 mg in the PM. 30 capsule 11     cycloSPORINE modified (GENERIC EQUIVALENT) 25 MG capsule Take 3 capsules (75 mg) by mouth daily. Total dose: 75 mg in the AM and 100 mg in the PM 90 capsule 11     Airsynergy Renal Support 1.8 PO LIQD Place 1,000 mLs into J tube daily. Infuse via pump  Water flush: 30ml 6x/day 79385 mL 10     Airsynergy Renal Support 1.8 PO LIQD Place 960 mLs into J tube daily. Infuse via pump  80ml/hr X 12 hrs/day  Water flush: 30ml q 4 hours or 6x/day 59764 mL 11     levothyroxine (SYNTHROID/LEVOTHROID) 25 MCG tablet Take 1 tablet (25 mcg) by mouth daily. 30 tablet 11     Lidocaine (LIDOCARE) 4 % Patch Place 1 patch onto the skin every 24  "hours To prevent lidocaine toxicity, patient should be patch free for 12 hrs daily. 30 patch 3     lidocaine (LMX4) 4 % external cream Apply topically once as needed for mild pain.       loperamide (IMODIUM A-D) 2 MG tablet 1 tablet (2 mg) by Per J Tube route 4 times daily as needed for diarrhea       midodrine (PROAMATINE) 10 MG tablet Take 1 tablet (10 mg) by mouth 2 times daily as needed (for map less than 65 or sbp less than 100 on HD days) 30 tablet 0     multivitamin (CENTRUM SILVER) tablet Take 1 tablet by mouth daily       Prosource TF PO LIQD (PROSOURCE TF) Place 45 mLs into J tube 2 times daily. Infuse 2 packets via syringe each day 2700 mL 11     protein modular (PROSOURCE TF) LIQD 2 packets by Per Feeding Tube route daily       sevelamer carbonate, RENVELA, 0.8 GM PACK Packet Take 1 packet (0.8 g) by mouth 3 times daily (with meals) (Patient taking differently: Take 0.8 g by mouth 5 times daily)       sulfamethoxazole-trimethoprim (BACTRIM) 400-80 MG tablet Take 1 tablet by mouth three times a week. 30 tablet 2     No current facility-administered medications for this visit.            Physical Exam:   BP (!) 146/90 (BP Location: Right arm, Patient Position: Chair, Cuff Size: Adult Small)   Pulse 102   Ht 1.6 m (5' 3\")   Wt 37.5 kg (82 lb 11.2 oz)   SpO2 93%   BMI 14.65 kg/m      GENERAL: ill appearing, NAD  HEENT: NCAT, EOMI, no scleral icterus, oral mucosa moist and without lesions  Neck: no cervical or supraclavicular adenopathy  Lungs: moderate airflow, mainly clear  CV: RRR, S1S2, no murmurs noted  Abdomen: emaciated, normoactive BS  Lymph: no edema  Neuro: AAO X 3, CN 2-12 grossly intact  Psychiatric: normal affect, good eye contact  Skin: no rash, jaundice or lesions on limited exam; lanugo present         Data:   All laboratory and imaging data reviewed.      Recent Results (from the past week)   Comprehensive metabolic panel    Collection Time: 01/15/25 10:42 AM   Result Value Ref Range    " Sodium 143 135 - 145 mmol/L    Potassium 3.5 3.4 - 5.3 mmol/L    Carbon Dioxide (CO2) 27 22 - 29 mmol/L    Anion Gap 10 7 - 15 mmol/L    Urea Nitrogen 21.9 8.0 - 23.0 mg/dL    Creatinine 4.04 (H) 0.51 - 0.95 mg/dL    GFR Estimate 12 (L) >60 mL/min/1.73m2    Calcium 10.2 8.8 - 10.4 mg/dL    Chloride 106 98 - 107 mmol/L    Glucose 99 70 - 99 mg/dL    Alkaline Phosphatase 87 40 - 150 U/L    AST 22 0 - 45 U/L    ALT 13 0 - 50 U/L    Protein Total 7.0 6.4 - 8.3 g/dL    Albumin 3.8 3.5 - 5.2 g/dL    Bilirubin Total 0.2 <=1.2 mg/dL   CBC with platelets    Collection Time: 01/15/25 10:42 AM   Result Value Ref Range    WBC Count 6.8 4.0 - 11.0 10e3/uL    RBC Count 3.50 (L) 3.80 - 5.20 10e6/uL    Hemoglobin 11.5 (L) 11.7 - 15.7 g/dL    Hematocrit 38.6 35.0 - 47.0 %     (H) 78 - 100 fL    MCH 32.9 26.5 - 33.0 pg    MCHC 29.8 (L) 31.5 - 36.5 g/dL    RDW 14.0 10.0 - 15.0 %    Platelet Count 235 150 - 450 10e3/uL   Magnesium    Collection Time: 01/15/25 10:42 AM   Result Value Ref Range    Magnesium 2.7 (H) 1.7 - 2.3 mg/dL   Extra Purple Top Tube    Collection Time: 01/15/25 10:43 AM   Result Value Ref Range    Hold Specimen [facility]    General PFT Lab (Please always keep checked)    Collection Time: 01/15/25 10:46 AM   Result Value Ref Range    FVC-Pred 2.74 L    FVC-Pre 1.08 L    FVC-%Pred-Pre 39 %    FEV1-Pre 1.06 L    FEV1-%Pred-Pre 48 %    FEV1FVC-Pred 80 %    FEV1FVC-Pre 99 %    FEFMax-Pred 5.99 L/sec    FEFMax-Pre 3.30 L/sec    FEFMax-%Pred-Pre 54 %    FEF2575-Pred 2.00 L/sec    FEF2575-Pre 2.38 L/sec    EOU5725-%Pred-Pre 118 %    ExpTime-Pre 5.23 sec    FIFMax-Pre 3.20 L/sec    FEV1FEV6-Pred 80 %    FEV1FEV6-Pre 99 %     PFT interpretation:  Maneuver: valid and didn't meet ATS      Transplant Coordinator Note    Reason for visit: Post lung transplant follow up visit   Coordinator: Present   Caregiver: None present.     Health concerns addressed today:  1. Felt sick around Felicia, febrile to 101, did not call  transplant clinic. Lasted 3-4 days.   2. Pt is off amiodarone and BP meds, 140-160, pulse is rarely under 100. Will defer to nephrology to manage.   3. Rare cough, no shortness of breath.   4. Stopped doing 4 cans of enteral feedings about 1 month ago, doing 1 can/day. Bad stomach aches and diarrhea, feels better if she doesn't eat. One microwave dinner per day. Feels nauseous w/PO and TF intake.   5. Wasn't using her AVAPS device, ever, has been lying to the team and DME provider about it.   6. Patient does not want to admit to the hospital, agreeable today to palliative care referral.     Activity/rehab: Not exercising, quite fatigued.  Oxygen needs: Does not wear AVAPs, ever.   Pain management/RX: NA, lidocaine patches.  Next Bronch due: prn    COVID:  COVID-19 infection (yes/no, date of most recent positive test):   Status/instructions given about COVID-19 vaccine:     Pt Education: medications (use/dose/side effects), how/when to call coordinator, frequency of labs, s/s of infection/rejection, call prior to starting any new medications, lab/vital sign book    Health Maintenance:   Last colonoscopy:   Next colonoscopy due:   Dermatology:  Vaccinations this visit:     Labs, CXR, PFTs reviewed with patient  Medication record reviewed and reconciled  Questions and concerns addressed    Patient Instructions  1. Continue to hydrate with 60-70 oz fluids daily.  2. Continue to exercise daily or most days of the week.  3. Follow up with your primary care provider for annual gender health maintenance procedures.  4. Follow up with colonoscopy schedule.  5. Follow up with annual dermatology visits.  6. It doesn't seem like the COVID vaccine is working well in lung transplant patients. A number of lung transplant patients have gotten sick with COVID even after receiving the vaccines. Based on our recent experience, it can be life-threatening to get COVID  even after being vaccinated. Please continue to act like you did not  get the COVID vaccine - social distancing, wearing a mask, good hand hygiene, etc. If the people around you are vaccinated, it will help reduce the risk of you getting COVID. All members of your household should be vaccinated.  7. Kaylin will reach out to Dr. Navarro regarding possibly starting prucalopride. We will reach out to Yung, transplant pharmacist for him to review as well.   8. PFTs in 3-4 weeks.   9. We continue to recommend using the AVAPs machine with every time you sleep, naps included.   10. We will place a referral for palliative care.   11. We will increase your prednisone to 10 mg daily, we will reassess this when we follow up with you in a month.   12. Try to get the two packets of protein daily.     Next transplant clinic appointment: 1 month with CXR, labs and PFTs  Next lab draw: pending labs today.     AVS printed at time of check out        Again, thank you for allowing me to participate in the care of your patient.        Sincerely,        Kaylin Triana PA-C    Electronically signed

## 2025-01-15 NOTE — PROGRESS NOTES
Per discussion w/Kaylin and COLEMAN Navarro, will plan to trial prucalorpide at 1 mg daily. Per Dr. Navarro, after ~4 weeks, dose can be increased to 2 mg but would not increase further d/t pt's renal failure. The medication should be taken orally, as it shouldn't be crushed. The main side effecting being diarrhea/GI upset, this can be reversed by stopping the med or cutting the dose.     [employer] message sent to patient.     1/16 9820 Addendum:   Called patient, confirmed she is agreeable to starting prucalorpide, [facility] pt's request. Discussed side effects and ability to increase dose if tolerated. Discussed cyclosporine level, no dose change at this time. Will follow up w/patient in 1 month in clinic, appt scheduled.

## 2025-01-15 NOTE — NURSING NOTE
Chief Complaint   Patient presents with    Follow Up     Post Lung        Vitals were taken.    Dash Tinajero, Clinic Assistant   11:47 AM

## 2025-01-15 NOTE — PATIENT INSTRUCTIONS
Patient Instructions  1. Continue to hydrate with 60-70 oz fluids daily.  2. Continue to exercise daily or most days of the week.  3. Follow up with your primary care provider for annual gender health maintenance procedures.  4. Follow up with colonoscopy schedule.  5. Follow up with annual dermatology visits.  6. It doesn't seem like the COVID vaccine is working well in lung transplant patients. A number of lung transplant patients have gotten sick with COVID even after receiving the vaccines. Based on our recent experience, it can be life-threatening to get COVID  even after being vaccinated. Please continue to act like you did not get the COVID vaccine - social distancing, wearing a mask, good hand hygiene, etc. If the people around you are vaccinated, it will help reduce the risk of you getting COVID. All members of your household should be vaccinated.  7. Kaylin will reach out to Dr. Navarro regarding possibly starting prucalopride. We will reach out to Yung, transplant pharmacist for him to review as well.   8. PFTs in 3-4 weeks.   9. We continue to recommend using the AVAPs machine with every time you sleep, naps included.   10. We will place a referral for palliative care.   11. We will increase your prednisone to 10 mg daily, we will reassess this when we follow up with you in a month.   12. Try to get the two packets of protein daily.     Next transplant clinic appointment: 1 month with CXR, labs and PFTs  Next lab draw: pending labs today.     AVS printed at time of check out

## 2025-01-16 NOTE — RESULT ENCOUNTER NOTE
Correction:  Cyclosporine level 122 at 12 hours, on 1/15.  Goal 120-140.  Current dose 75 mg in AM, 100 mg in PM    No dose change at this time.  Recheck level in 1 month.    Discussed with patient via phone.

## 2025-01-16 NOTE — RESULT ENCOUNTER NOTE
Tacrolimus level 122 at 12 hours, on 1/15.  Goal 120-140.   Current dose 75 mg in AM, 100 mg in PM    No dose change at this time.   Recheck level in 1 month.     Discussed with patient via phone.

## 2025-01-20 DIAGNOSIS — Z94.2 S/P LUNG TRANSPLANT (H): ICD-10-CM

## 2025-01-20 DIAGNOSIS — Z94.2 LUNG TRANSPLANT STATUS, BILATERAL (H): ICD-10-CM

## 2025-01-20 RX ORDER — PREDNISONE 5 MG/1
5 TABLET ORAL 2 TIMES DAILY
Qty: 60 TABLET | Refills: 1 | Status: SHIPPED | OUTPATIENT
Start: 2025-01-20

## 2025-01-21 LAB — PROSPERA TRANSPLANT MONITORING: 0.54 %

## 2025-01-22 ENCOUNTER — TRANSFERRED RECORDS (OUTPATIENT)
Dept: HEALTH INFORMATION MANAGEMENT | Facility: CLINIC | Age: 65
End: 2025-01-22
Payer: MEDICARE

## 2025-01-22 LAB — PHQ9 SCORE: 7

## 2025-01-27 ENCOUNTER — HOSPITAL ENCOUNTER (OUTPATIENT)
Dept: INTERVENTIONAL RADIOLOGY/VASCULAR | Facility: CLINIC | Age: 65
Discharge: HOME | End: 2025-01-27
Admitting: RADIOLOGY
Payer: MEDICARE

## 2025-01-27 VITALS
TEMPERATURE: 97.5 F | OXYGEN SATURATION: 94 % | HEART RATE: 107 BPM | DIASTOLIC BLOOD PRESSURE: 67 MMHG | SYSTOLIC BLOOD PRESSURE: 146 MMHG

## 2025-01-27 DIAGNOSIS — R63.30 FEEDING DIFFICULTIES: Primary | ICD-10-CM

## 2025-01-27 DIAGNOSIS — R63.30 FEEDING DIFFICULTIES: ICD-10-CM

## 2025-01-27 PROCEDURE — C1769 GUIDE WIRE: HCPCS

## 2025-01-27 PROCEDURE — 49452 REPLACE G-J TUBE PERC: CPT

## 2025-01-27 NOTE — DISCHARGE INSTRUCTIONS
Gastrostomy (G) or Gastrojejunostomy (G/J) Tube Exchange Discharge Instructions:   You had a gastrostomy (G) tube or a Gastrojejunostomy Tube (GJ) exchanged.  This tube is often used for nutritional support and medication administration or it can be used for stomach venting.     Care instructions:  - If you received sedation for your procedure, do not drive or operate heavy machinery for the rest of the day.  - Avoid soaking in stagnant water (tub baths, Jacuzzis, pools, lake, or ocean).   - You may shower beginning the day after the tube was exchanged.   - Clean under the disc daily with soap and water. Pat dry under disc and apply new split gauze dressing under disc. Cleaning tube site daily will help prevent infection and skin irritation.  - A small amount of clear tan drainage from the tube exit site can be normal.  - Make sure the disc on the tube fits slightly snug against the skin so that the tube does not move in or out of the body easily.   - If you experience leaking from around tube exit site, the most common cause is that the disc is not tightened against the skin.   - To tighten the disc, pull gently on the tube so the retention balloon (under the skin) is pulled up against the skin under the tube. Push the disc down until it is tight against the skin without a gap between the skin and the disc.  - Flush your tube with 60cc of water twice a day (using a cath tip syringe) to prevent tube from clogging (or follow recommendations from your doctor or dietician if given).    Giving Feedings and Medications:  - Follow-up with your dietician, primary care provider, or oncologist for instructions on tube feedings and medication administration.    - ONLY use specific enteric feedings with your tube (unless otherwise discussed with your dietitian).    - Flush tube at least twice daily with 60ml of water using cath tip syringe unless otherwise instructed by your doctor or dietician.  - Flush the tube before and  after administrating medications and bolus feedings with 60cc of water.    Follow Up:  - Recommend routine 3 month exchanges of feeding tube. Please contact your primary care provider or speak with your dietitian to obtain an order to have your G/GJ tube exchanged. Then contact [facility]'s Medical Imaging scheduling department at [phone] to schedule your G/GJ tube exchange appointment.    Please seek medical evaluation for:  - Fever (greater than 101 F (38.3C).  - Purulent (yellow/green/foul smelling) drainage from tube exit site.   - Significant or worsening abdominal pain.   - Skin that is hot to the touch or significantly reddened at the tube exit site.   - Bleeding at tube exit site.    Call [facility] RN Line at [phone] with questions or if you have any of the following symptoms:  - Tube falls out or felt to be out of position.  - Unable to flush tube.  - Significant leakage around tube site (tube feeding, medications or drainage).  - Significant bleeding at the tube exit site.  - Severe pain at tube exit site.     DISPLAY PLAN FREE TEXT

## 2025-01-27 NOTE — IR NOTE
Patient Name: Sofie Rodriguez  Medical Record Number: [id number]  Today's Date: 1/27/2025    Procedure: Image guided gastrojejunostomy tube exchange  Proceduralist: Dr. Ellison  Pathology present: na    Procedure Start: 1328  Procedure end: 1331  Sedation medications administered: none     : efren    Other Notes: Pt arrived to IR room 1 from radiology waiting room. Consent reviewed. Pt denies any questions or concerns regarding procedure. Pt positioned supine and monitored per protocol. Pt tolerated procedure without any noted complications. Pt transferred back to IR pre/post 1.    G/J tube exchanged today, [employer] brand, 18 [address], LOT # [id number] REF #[id number]. Patient declined discharge instructions or AVS from writer, states that she has the number if she needs to reach us. Patient ambulatory back to waiting room.

## 2025-01-28 ENCOUNTER — DOCUMENTATION ONLY (OUTPATIENT)
Dept: TRANSPLANT | Facility: CLINIC | Age: 65
End: 2025-01-28
Payer: MEDICARE

## 2025-01-28 DIAGNOSIS — Z94.2 S/P LUNG TRANSPLANT (H): ICD-10-CM

## 2025-01-28 DIAGNOSIS — K31.84 GASTROPARESIS: Primary | ICD-10-CM

## 2025-01-28 NOTE — PROGRESS NOTES
LMN completed at this time for Motegrity (prucalopride) for treatment of gastroparesis. Message sent to PA team for assistance with appeal.

## 2025-01-28 NOTE — LETTER
2025    RE: Sofie Rodriguez  [address] [place] [address]  : 1960  MRN: [id number]  Policy #: [id number]  Case/Reference: [id number]    To Whom It May Concern,    I am writing on behalf of my patient, Sofie Rodriguez to document the medical necessity of Motegrity (prucalopride) for the treatment of gastroparesis. This letter provides information about the patient's medical history and diagnosis and a statement summarizing my treatment rationale.     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 Sofie has had various complications since transplant and follows closely with the gastroenterology team at the [facility] for gastroparesis. Sofie has had several rounds of SIBO (small intestinal bacterial overgrowth) treatment and G-POEM (a minimally invasive procedure to relieve symptoms of gastroparesis). Sofie was hospitalized for a prolonged period in spring of 2024 for continued weight loss and malnutrition. Sofie had a jejunostomy tube placed and is on bolus enteral feedings. Per Dr. Navarro, Sofie has had no improvement in her gastroparesis following G-POEM, a repeat gastric emptying study on 1/15/2025 that showed worsening delay in gastric emptying, for reference her percent retention at 4 hours is 86%, on 2023 it was at 75%. Per Dr. Navarro, given patient s ongoing difficulty in tolerating J-tube feeds from small bowel hypomotility, it is unlikely Reglan/Erythromycin will have much overall benefit. Prucalopride (Motility) has been recommended as she is not tolerating 4 cartons of enteral feedings daily and has since lost more weight in the past year. When patient was seen by transplant provider, Kaylin Triana PA-C, on 1/15/2025, she weighed 82 pounds and is failing to thrive.  Patient has complex history of gastroparesis and thus the formulary agent,  Linzess would not be as effective.    Duration  If the medication is tolerated, Sofie would be on Motegrity (prucalopride) for a minimum of 12 months.     Summary  In summary, Motegrity (prucalopride) 1 mg tablet once daily is medically necessary for the patient s medical condition. Please call my office at [phone] if I can provide you with any additional information to approve my request. I look forward to receiving your timely response and approval of this request.    Sincerely,        FLOYD Bush RN

## 2025-01-29 ENCOUNTER — HOME INFUSION BILLING (OUTPATIENT)
Dept: HOME HEALTH SERVICES | Facility: HOME HEALTH | Age: 65
End: 2025-01-29
Payer: MEDICARE

## 2025-02-04 ENCOUNTER — DOCUMENTATION ONLY (OUTPATIENT)
Dept: TRANSPLANT | Facility: CLINIC | Age: 65
End: 2025-02-04
Payer: MEDICARE

## 2025-02-04 DIAGNOSIS — Z94.2 S/P LUNG TRANSPLANT (H): ICD-10-CM

## 2025-02-04 NOTE — PROGRESS NOTES
Insurance PA approved for Motegrity on 1/29/2025. Patient updated, requested she contact [facility] for shipment if she hasn't yet received the med.

## 2025-02-06 LAB
DONOR IDENTIFICATION: NORMAL
DQB2: 4508
DSA COMMENTS: NORMAL
DSA PRESENT: YES
DSA TEST METHOD: NORMAL
ORGAN: NORMAL
SA 1  COMMENTS: NORMAL
SA 1 CELL: NORMAL
SA 1 TEST METHOD: NORMAL
SA 2 CELL: NORMAL
SA 2 COMMENTS: NORMAL
SA 2 TEST METHOD: NORMAL
SA1 HI RISK ABY: NORMAL
SA1 MOD RISK ABY: NORMAL
SA2 HI RISK ABY: NORMAL
SA2 MOD RISK ABY: NORMAL
UNACCEPTABLE ANTIGENS: NORMAL
UNOS CPRA: 37

## 2025-02-11 NOTE — PROGRESS NOTES
Arrives via [facility].   Had fall out of [facility] around 2pm. Since fall patient has been unresponsive. Not able to follow commands. Pt cool to touch. Arrives with c collar in place.   Pt usually alert and oriented x4. Currently being seen for speech difficulty.    [facility]  August 24, 2022         Assessment and Plan:   Sofie Rodriguez is a 62 year old female with a PMH significant for end-stage COPD, HTN, HFpEF, Mycobacterium peregrinum colonization, h/o hepatitis C and former methamphetamine use s/p BSLT on 6/28/22 (lungs slightly undersized) with persistent hypercapnia. Post-operative course complicated by bilateral pleural effusions, left radial artery thrombus (presumed secondary to arterial line) s/p thrombectomy 7/2, BACILIO, C diff, gastroparesis s/p GJ tube placement in IR 7/27, s/p EGD 8/3 with pyloric ulcer noted, melena with hgb drop (8/8), elevated LFTs (8/8) with extrahepatic mass on US and MRCP with increase in biliary dilation. S/p ERCP 8/11 with findings concerning for hemobilia, but no bleeding from ulcer in pyloric channel. She was seen in the ED last week for a clogged tube. This is her second post discharge follow up.      1. S/p bilateral sequential lung transplant (BSLT) for end stage COPD:  Persistent hypercapneic respiratory failure:   Bilateral hydroPTX:   Right hemidiaphragm palsy: persistent hypercapnia without dyspnea. Sniff 7/14 notable for right hemidiaphragm palsy. NIPPV initially overnight for persistent hypercapnia, stopped 8/3 given lack of improvement with therapy, compensated hypercapnia persists. CTA abdomen 8/11 noted similar appearance of bilateral loculated moderate pleural effusions with adjacent compressive atelectasis and LL predominant interlobular septal thickening. S/p right diagnostic thoracentesis 8/12 with 100 ml removed. Notes slight increase in productive cough, continues to use 1L O2. CMV 8/18 negative. CXR reviewed today demonstrates stable left effusion. PFTs are down slightly from her initial post transplant, but did not meet ATS guidelines for FVC (lower than expected thought secondary to diaphragm palsy and possible left effusion).   - Sputum sample if able  -  Has left thoracentesis scheduled for 8/29  - VBG with next labs, benefit from bipap?  - Overnight O2 study and 6 MWT in the future  - Needs to start pulmonary rehab (not home PT/OT)     Immunosuppression:  Induction therapy with basiliximab (and high dose IV steroid)  - Tacrolimus 4 mg BID (via J tube)  Goal level 8-12  -  mg BID (increased 8/7, prior decrease for GI symptoms/leukopenia)   - Prednisone taper per below    Date AM dose (mg) PM dose (mg)   8/18/22 10 10   9/15/22 10 7.5   10/13/22 7.5 7.5   11/10/22 7.5 5   12/8/22 5 5   1/5/23 5 2.5     - HOLD dapsone giving worsening anemia (Bactrim stopped for hyperkalemia), may need to consider pentamidine prophylaxis  - VGCV for CMV ppx x 3 months (to end on 9/28)  - Nystatin for oral candidiasis ppx X 6 month course      Donor Recipient Intervention   CMV status Positive Positive Valganciclovir POD #8-90   EBV status Positive Positive EBV monitoring as below   HSV status N/A Positive Not indicated       2. Positive DSA: newly positive DSA on 8/10 with repeat on 8/15 increased from 2155 to 3584.  - DSA ordered for 8/29     3. H/o M. peregrinum colonization: NGTD post-transplant.  - AFB to be sent on all future bronchs     4. H/o hepatitis C: diagnosed in 1980s s/p 2m treatment, last positive 2/20/17 ([phone]).  Ab positive on 6/2021 with negative HCV PCR.  H/o remote EtOH abuse.  MR elastography (4/27/21) with hepatology review and consult without any concerns post transplant. Hepatitis C RNA negative and hepatitis C antibody positive (old) on 6/28.  Repeat hepatitis C RNA negative 8/8 in setting of elevated LFTs.     5. EBV viremia: last level of 1501 (low 3.2) on 8/8  - EBV monthly due 9/8     6. C diff infection: positive 7/11. S/p PO vancomycin (7/11-7/28).  Repeat C diff negative 8/6.  Low threshold to resume vancomycin in the future with ABX course. Stools are stable.       7. Hypogammaglobulinemia: S/p IVIG dosing with premedication 7/30. IgG on 8/10  low but stable at 590.  - Repeat IgG  On 8/29 for a one month check     8. A flutter with RVR/SVT: SVT first noted on 7/14, prior to HD line placement, continues intermittently.  Aflutter with RVR to 200 7/17 triggered by activity.  EP consulted 7/17 given persistent tachycardia/dysrhythmia, midodrine discontinued. AC deferred per EP given bleeding risk and despite CHADSVASc of 2. Zio patch fell off, will be replaced.  - Continue metoprolol  - Zio patch ordered with EP follow up     9. H/o HTN:  H/o HFpEF:  Had vasoplegia post operatively. Last echo 7/7/22 normal EF with good LV function. S/p hydrocortisone 7/6-7/9 and fludrocortisone 7/6-7/11 without significant improvement.   - Continue metoprolol 50 mg BID, may need to increase given her BPs    10. Left hand ischemia: radial artery thrombosis identified on duplex doppler s/p thrombectomy on 7/2. Repeat LUE arterial US 8/15/22 with dopplerable flow. No longer on AC.      11. BACILIO:   Hyperkalemia: multifactorial including medications (Bactrim, tacrolimus) and hypotension. iHD 7/14-7/16. Potassium intermittently elevated since 8/8. Transitioned to low potassium TF formula.  - Tacrolimus monitoring as above  - Continue Florinef     12. Elevated LFTs:   Extrahepatic and intrahepatic biliary dilation: ccute rise in LFTs on 8/8 associated with increased and different abdominal pain as below. Abdominal US 8/8 with extrahepatic mass at level of common bile duct with associated intrahepatic and common bile duct dilation, patent hepatic vasculature, and layering gallbladder sludge. MRCP 8/9 with slight increase in moderate biliary dilation and gall bladder with moderate protein/hemorrhagic material. S/p ERCP 8/11 with findings concerning for hemobilia, stent placed across duodenum and in CBD. CTA abdomen 8/11 without evidence of acute GI bleed. GI team concerned bleeding originating from gallbladder. LFTs today WNL.   - Given acute decrease in hgb, may need to move up EGD  scheduled on 10/13 or consider CT abdomen if not improving (currently set for 6 months for pancreatic findings ~2/2023)     13. IPMN: MRCP 8/9 showed cystic foci in the pancreatic head measuring 4 x 3 mm superiorly and 4 x 3 mm inferiorly.   - Follow up imaging in 6 months to 1 year    14. Severe gastroparesis:   Pyloric ulcer: CT abdomen 7/15 with moderate to large gastric distention, otherwise without obstruction. Gastric emptying study 7/20 with severe gastric emptying delay (95% retention at 4 hours), s/p GJ tube placement in IR 7/27. S/p EGD 8/3 for coffee ground G tube output, noted to have pyloric ulcer and excessive gastric fluid and residual food. Hemoglobin drop with dark tarry stools 8/8.  S/p repeat EGD 8/11 with mild erythema 2/2 GJ tube trauma seen near insertion site but no active bleeding. Discussed diet today and recommend she go back to full liquid for comfort only for now.   - PPI BID  - Simethicone prn  - TF are continuous and ALL medications via J tube  - G tube to gravity drainage; full liquid diet for comfort only  - Repeat EGD 10/13 to check healing of ulcer, sooner if hgb declines further     15. Post op pain management: feels she still needs oxycodone, discussed weaning off over the next two weeks. Not using Tylenol.  - Recommend Tylenol 1000 mg BID  - Gave last prescription for oxycodone 5 mg every 8 hours PRN     16. Acute on chronic anemia: last PRBC on 8/9/22. Thought secondary to chronic illness and BM suppression from meds. MCV elevated due to MMF. Today, hgb down to 7.9 from 9/1 on 8/16. Ferritin elevated, iron and sat index WNL. B12 WNL. Retic % slightly elevated at 2.6, absolute RC WNL  - Stop dapsone today  - Monitor, if declines further, may need CT abdomen and/or move up EGD     17. Stress-induced/steroid induced hyperglycemia with intermittent hypoglycemia: most BS have been < 200. Unclear why she is doing every 4 hour BS checks at home as this is not sustainable overnight with  "her caretaker.  - Will have Peter follow up  - Continue Lantus and sliding scale insulin    We discussed the risks and benefits of receiving EVUSHELD, as well as alternative treatments. The Emergency Use Administration (EUA) was provided to the patient for review and an opportunity for questions was provided. Patient wishes to proceed with EVUSHELD.    I have spent >60 minutes on the day of the visit reviewing the patient's chart, vitals, labs, imaging, PFTs and answering all patient questions excluding PFTs.     RTC: next week as scheduled  Influenza and other vaccinations: received 4 doses of covid vaccine pre transplant; Evusheld today  Annual dermatology visit:    Kaylin Triana PA-C  Pulmonary, Allergy, Critical Care and Sleep Medicine        Interval History:     Since her last visit, patient is doing okay. On 1 L O2 most of the time except when she is walking, but does drop down into the upper 80s. Needs to do PT/OT at home before insurance will cover pulmonary rehab. No new shortness of breath, does have a cough at night per her daughter, intermittently productive and more with her breathing exercises. No congestion or tightness. Notes she is still using oxycodone twice per day, requesting more. No nausea, had one episode of a \"zap\" around her feeding tube, but that has resolved. No vomiting, stools are formed as of yesterday. Doing continuous tube feeds, flushes are           Review of Systems:   Please see HPI, otherwise the complete 10 point ROS is negative.           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     Hepatitis 2017    Hep C, [facility]     HTN (hypertension)      Osteopenia      Past Surgical History:   Procedure Laterality Date     BRONCHOSCOPY (RIGID OR FLEXIBLE), DIAGNOSTIC N/A " 8/2/2022    Procedure: BRONCHOSCOPY, DIAGNOSTIC- inspection Bronch;  Surgeon: Kamala Lovell MD;  Location:  GI     BRONCHOSCOPY FLEXIBLE AND RIGID N/A 07/19/2022    Procedure: BRONCHOSCOPY inspection only;  Surgeon: Bob Liao MD;  Location:  GI     COLONOSCOPY  2015     CV CORONARY ANGIOGRAM N/A 06/30/2021    Procedure: CV CORONARY ANGIOGRAM;  Surgeon: Alexander Cuellar MD;  Location:  HEART CARDIAC CATH LAB     CV RIGHT HEART CATH MEASUREMENTS RECORDED N/A 06/30/2021    Procedure: CV RIGHT HEART CATH;  Surgeon: Alexander Cuellar MD;  Location:  HEART CARDIAC CATH LAB     ENDOSCOPIC RETROGRADE CHOLANGIOPANCREATOGRAM N/A 8/11/2022    Procedure: ENDOSCOPIC RETROGRADE CHOLANGIOPANCREATOGRAPHY WITH PANCREATIC DUCT NEEDLE KNIFE AND STENT PLACEMENT, BILE DUCT SPHINCTEROTOMY, BLOOD/DEBRIS REMOVAL AND STENT PLACEMENT;  Surgeon: Cosmo Arroyo MD;  Location:  OR     ENT SURGERY  1974    tonsillectomy     ENTEROSCOPY SMALL BOWEL N/A 8/11/2022    Procedure: SMALL BOWEL ENTEROSCOPY;  Surgeon: Cosmo Arroyo MD;  Location:  OR     ESOPHAGOGASTRODUODENOSCOPY, WITH NASOGASTRIC TUBE INSERTION N/A 07/01/2022    Procedure: ESOPHAGOGASTRODUODENOSCOPY, WITH NASOJEJUNAL TUBE INSERTION;  Surgeon: Ozzy Nickerson MD;  Location:  GI     ESOPHAGOSCOPY, GASTROSCOPY, DUODENOSCOPY (EGD), COMBINED N/A 8/3/2022    Procedure: ESOPHAGOGASTRODUODENOSCOPY (EGD);  Surgeon: Ira Andres MD;  Location:  GI     HAND SURGERY       LEEP TX, CERVICAL  04/07/2017    HECTOR III     LYMPH NODE BIOPSY Left 2005    Left axilla, benign- [place]     MIDLINE INSERTION - DOUBLE LUMEN Left 07/28/2022    20cm, Basilic vein     THROMBECTOMY UPPER EXTREMITY Left 07/02/2022    Procedure: LEFT RADIAL ARM THROMBECTOMY;  Surgeon: Christie Graham MD;  Location:  OR     TRANSPLANT LUNG RECIPIENT SINGLE X2 Bilateral 06/28/2022    Procedure: Clamshell Incision, Bilateral Sequential Lung  Transplant, On Cardiopulmonary Bypass, Flexible Bronchoscopy;  Surgeon: Sue Sunshine MD;  Location:  OR           Family History:     No family history on file.         Social History:     Social History     Socioeconomic History     Marital status: Single     Spouse name: Not on file     Number of children: Not on file     Years of education: Not on file     Highest education level: Not on file   Occupational History     Not on file   Tobacco Use     Smoking status: Former Smoker     Years: 30.00     Types: Cigarettes     Quit date: 2020     Years since quittin.7     Smokeless tobacco: Never Used   Substance and Sexual Activity     Alcohol use: Not Currently     Drug use: Not Currently     Types: Marijuana, Methamphetamines     Comment: hx:marijuana and methamphetamine-quit both unsure ?  2-3 yrs ago     Sexual activity: Not on file   Other Topics Concern     Parent/sibling w/ CABG, MI or angioplasty before 65F 55M? Not Asked   Social History Narrative     Not on file     Social Determinants of Health     Financial Resource Strain: Not on file   Food Insecurity: Not on file   Transportation Needs: Not on file   Physical Activity: Not on file   Stress: Not on file   Social Connections: Not on file   Intimate Partner Violence: Not on file   Housing Stability: Not on file            Medications:     Current Outpatient Medications   Medication     acetaminophen (TYLENOL) 160 MG/5ML liquid     oxyCODONE (ROXICODONE) 5 MG tablet     alcohol swab prep pads     aspirin (ASA) 81 MG EC tablet     blood glucose (CONTOUR NEXT TEST) test strip     blood glucose (NO BRAND SPECIFIED) lancets standard     blood glucose monitoring (CONTOUR NEXT MONITOR W/DEVICE KIT) meter device kit     calcium carbonate 600 mg-vitamin D 400 units (CALTRATE) 600-400 MG-UNIT per tablet     CELLCEPT (BRAND) 200 MG/ML suspension     fludrocortisone (FLORINEF) 0.1 MG tablet     insulin aspart (NOVOLOG PEN) 100 UNIT/ML pen     insulin  "glargine (LANTUS PEN) 100 UNIT/ML pen     insulin pen needle (31G X 5 MM) 31G X 5 MM miscellaneous     metoprolol tartrate (LOPRESSOR) 50 MG tablet     Multiple Vitamins-Minerals (MULTIVITAMINS W/MINERALS) liquid     mycophenolate (GENERIC EQUIVALENT) 200 MG/ML suspension     Nutritional Supplements (GLUCOSE MANAGEMENT) TABS     nystatin (MYCOSTATIN) 377491 UNIT/ML suspension     ondansetron (ZOFRAN ODT) 4 MG ODT tab     pantoprazole (PROTONIX) 2 mg/mL SUSP suspension     predniSONE (DELTASONE) 5 MG tablet     protein modular (PROSOURCE TF) LIQD     Sharps Container MISC     simethicone (MYLICON) 40 MG/0.6ML suspension     tacrolimus (GENERIC EQUIVALENT) 1 mg/mL suspension     valGANciclovir (VALCYTE) 50 MG/ML solution     No current facility-administered medications for this visit.            Physical Exam:   /77 (BP Location: Right arm, Patient Position: Chair, Cuff Size: Adult Regular)   Pulse 94   Ht 1.6 m (5' 3\")   Wt 70.3 kg (155 lb)   SpO2 95%   BMI 27.46 kg/m      GENERAL: alert, NAD  HEENT: NCAT, EOMI, no scleral icterus, oral mucosa moist and without lesions  Neck: no cervical or supraclavicular adenopathy  Lungs: moderate airflow, decreased in bases L>R  CV: RRR, S1S2, no murmurs noted  Abdomen: normoactive BS, soft, non tender  Lymph: no edema  Neuro: AAO X 3, CN 2-12 grossly intact  Psychiatric: normal affect, good eye contact  Skin: no rash, jaundice or lesions on limited exam         Data:   All laboratory and imaging data reviewed.      Recent Results (from the past 168 hour(s))   Magnesium    Collection Time: 08/18/22 10:33 AM   Result Value Ref Range    Magnesium 2.6 (H) 1.6 - 2.3 mg/dL   CBC with platelets    Collection Time: 08/18/22 10:33 AM   Result Value Ref Range    WBC Count 8.7 4.0 - 11.0 10e3/uL    RBC Count 2.79 (L) 3.80 - 5.20 10e6/uL    Hemoglobin 8.6 (L) 11.7 - 15.7 g/dL    Hematocrit 28.3 (L) 35.0 - 47.0 %     (H) 78 - 100 fL    MCH 30.8 26.5 - 33.0 pg    MCHC 30.4 " (L) 31.5 - 36.5 g/dL    RDW 16.7 (H) 10.0 - 15.0 %    Platelet Count 387 150 - 450 10e3/uL   CMV Quantitative, PCR    Collection Time: 08/18/22 10:33 AM    Specimen: Arm, Right; Blood   Result Value Ref Range    CMV DNA IU/mL Not Detected Not Detected IU/mL   Tacrolimus by Tandem Mass Spectrometry    Collection Time: 08/18/22 10:33 AM   Result Value Ref Range    Tacrolimus by Tandem Mass Spectrometry 7.6 5.0 - 15.0 ug/L    Tacrolimus Last Dose Date      Tacrolimus Last Dose Time     Comprehensive metabolic panel    Collection Time: 08/18/22 10:33 AM   Result Value Ref Range    Sodium 143 133 - 144 mmol/L    Potassium 4.7 3.4 - 5.3 mmol/L    Chloride 97 94 - 109 mmol/L    Carbon Dioxide (CO2) 44 (H) 20 - 32 mmol/L    Anion Gap 2 (L) 3 - 14 mmol/L    Urea Nitrogen 74 (H) 7 - 30 mg/dL    Creatinine 1.56 (H) 0.52 - 1.04 mg/dL    Calcium 9.0 8.5 - 10.1 mg/dL    Glucose 111 (H) 70 - 99 mg/dL    Alkaline Phosphatase 193 (H) 40 - 150 U/L    AST 16 0 - 45 U/L    ALT 41 0 - 50 U/L    Protein Total 5.9 (L) 6.8 - 8.8 g/dL    Albumin 2.3 (L) 3.4 - 5.0 g/dL    Bilirubin Total 0.3 0.2 - 1.3 mg/dL    GFR Estimate 38 (L) >60 mL/min/1.73m2   General PFT Lab (Please always keep checked)    Collection Time: 08/18/22 10:41 AM   Result Value Ref Range    FVC-Pred 3.06 L    FVC-Pre 1.33 L    FVC-%Pred-Pre 43 %    FEV1-Pre 1.22 L    FEV1-%Pred-Pre 50 %    FEV1FVC-Pred 79 %    FEV1FVC-Pre 92 %    FEFMax-Pred 6.14 L/sec    FEFMax-Pre 3.79 L/sec    FEFMax-%Pred-Pre 61 %    FEF2575-Pred 2.22 L/sec    FEF2575-Pre 2.04 L/sec    SMT0922-%Pred-Pre 91 %    ExpTime-Pre 4.94 sec    FIFMax-Pre 2.55 L/sec    FEV1FEV6-Pred 81 %    FEV1FEV6-Pre 95 %   Glucose by meter    Collection Time: 08/19/22  4:05 PM   Result Value Ref Range    GLUCOSE BY METER POCT 87 70 - 99 mg/dL   Comprehensive metabolic panel    Collection Time: 08/19/22  4:07 PM   Result Value Ref Range    Sodium 146 (H) 136 - 145 mmol/L    Potassium 3.7 3.4 - 5.3 mmol/L    Creatinine 1.45 (H)  0.51 - 0.95 mg/dL    Urea Nitrogen 61.7 (H) 8.0 - 23.0 mg/dL    Chloride 96 (L) 98 - 107 mmol/L    Carbon Dioxide (CO2) 43 (H) 22 - 29 mmol/L    Anion Gap 7 7 - 15 mmol/L    Glucose 84 70 - 99 mg/dL    Calcium 9.5 8.8 - 10.2 mg/dL    Protein Total 6.3 (L) 6.4 - 8.3 g/dL    Albumin 3.5 3.5 - 5.2 g/dL    Bilirubin Total 0.3 <=1.2 mg/dL    Alkaline Phosphatase 175 (H) 35 - 104 U/L    AST 24 10 - 35 U/L    ALT 33 10 - 35 U/L    GFR Estimate 41 (L) >60 mL/min/1.73m2   CBC with platelets and differential    Collection Time: 08/19/22  4:07 PM   Result Value Ref Range    WBC Count 7.8 4.0 - 11.0 10e3/uL    RBC Count 2.97 (L) 3.80 - 5.20 10e6/uL    Hemoglobin 8.9 (L) 11.7 - 15.7 g/dL    Hematocrit 29.9 (L) 35.0 - 47.0 %     (H) 78 - 100 fL    MCH 30.0 26.5 - 33.0 pg    MCHC 29.8 (L) 31.5 - 36.5 g/dL    RDW 16.9 (H) 10.0 - 15.0 %    Platelet Count 413 150 - 450 10e3/uL    % Neutrophils 79 %    % Lymphocytes 4 %    % Monocytes 10 %    % Eosinophils 2 %    % Basophils 1 %    % Immature Granulocytes 4 %    NRBCs per 100 WBC 0 <1 /100    Absolute Neutrophils 6.3 1.6 - 8.3 10e3/uL    Absolute Lymphocytes 0.3 (L) 0.8 - 5.3 10e3/uL    Absolute Monocytes 0.8 0.0 - 1.3 10e3/uL    Absolute Eosinophils 0.2 0.0 - 0.7 10e3/uL    Absolute Basophils 0.0 0.0 - 0.2 10e3/uL    Absolute Immature Granulocytes 0.3 <=0.4 10e3/uL    Absolute NRBCs 0.0 10e3/uL   Extra Blue Top Tube    Collection Time: 08/19/22  4:09 PM   Result Value Ref Range    Hold Specimen JIC    Extra Red Top Tube    Collection Time: 08/19/22  4:09 PM   Result Value Ref Range    Hold Specimen JIC    Glucose by meter    Collection Time: 08/19/22  5:09 PM   Result Value Ref Range    GLUCOSE BY METER POCT 170 (H) 70 - 99 mg/dL   CBC with platelets    Collection Time: 08/24/22  8:10 AM   Result Value Ref Range    WBC Count 7.8 4.0 - 11.0 10e3/uL    RBC Count 2.54 (L) 3.80 - 5.20 10e6/uL    Hemoglobin 7.6 (L) 11.7 - 15.7 g/dL    Hematocrit 25.6 (L) 35.0 - 47.0 %      (H) 78 - 100 fL    MCH 29.9 26.5 - 33.0 pg    MCHC 29.7 (L) 31.5 - 36.5 g/dL    RDW 16.4 (H) 10.0 - 15.0 %    Platelet Count 294 150 - 450 10e3/uL   Magnesium    Collection Time: 08/24/22  8:10 AM   Result Value Ref Range    Magnesium 2.6 (H) 1.6 - 2.3 mg/dL   Basic metabolic panel    Collection Time: 08/24/22  8:10 AM   Result Value Ref Range    Sodium 143 133 - 144 mmol/L    Potassium 4.1 3.4 - 5.3 mmol/L    Chloride 96 94 - 109 mmol/L    Carbon Dioxide (CO2) 42 (H) 20 - 32 mmol/L    Anion Gap 5 3 - 14 mmol/L    Urea Nitrogen 56 (H) 7 - 30 mg/dL    Creatinine 1.27 (H) 0.52 - 1.04 mg/dL    Calcium 9.0 8.5 - 10.1 mg/dL    Glucose 126 (H) 70 - 99 mg/dL    GFR Estimate 48 (L) >60 mL/min/1.73m2   Vitamin B12    Collection Time: 08/24/22  8:10 AM   Result Value Ref Range    Vitamin B12 536 193 - 986 pg/mL   Hepatic function panel    Collection Time: 08/24/22  8:10 AM   Result Value Ref Range    Bilirubin Total 0.2 0.2 - 1.3 mg/dL    Bilirubin Direct 0.1 0.0 - 0.2 mg/dL    Protein Total 6.0 (L) 6.8 - 8.8 g/dL    Albumin 2.2 (L) 3.4 - 5.0 g/dL    Alkaline Phosphatase 134 40 - 150 U/L    AST 16 0 - 45 U/L    ALT 22 0 - 50 U/L   Ferritin    Collection Time: 08/24/22  8:10 AM   Result Value Ref Range    Ferritin 334 (H) 8 - 252 ng/mL   Iron and iron binding capacity    Collection Time: 08/24/22  8:10 AM   Result Value Ref Range    Iron 41 35 - 180 ug/dL    Iron Binding Capacity 196 (L) 240 - 430 ug/dL    Iron Sat Index 21 15 - 46 %   Reticulocyte count    Collection Time: 08/24/22  8:10 AM   Result Value Ref Range    % Reticulocyte 2.6 (H) 0.5 - 2.0 %    Absolute Reticulocyte 0.065 0.025 - 0.095 10e6/uL   General PFT Lab (Please always keep checked)    Collection Time: 08/24/22  8:23 AM   Result Value Ref Range    FVC-Pred 3.06 L    FVC-Pre 1.23 L    FVC-%Pred-Pre 40 %    FEV1-Pre 1.17 L    FEV1-%Pred-Pre 48 %    FEV1FVC-Pred 79 %    FEV1FVC-Pre 95 %    FEFMax-Pred 6.14 L/sec    FEFMax-Pre 4.25 L/sec    FEFMax-%Pred-Pre 69 %     FEF2575-Pred 2.22 L/sec    FEF2575-Pre 2.78 L/sec    [id number]-%Pred-Pre 124 %    ExpTime-Pre 4.81 sec    FIFMax-Pre 3.09 L/sec    FEV1FEV6-Pred 81 %    FEV1FEV6-Pre 95 %     PFT interpretation:  Maneuver: valid, but did not meet ATS guidelines

## 2025-02-19 ENCOUNTER — MYC MEDICAL ADVICE (OUTPATIENT)
Dept: PULMONOLOGY | Facility: CLINIC | Age: 65
End: 2025-02-19
Payer: MEDICARE

## 2025-02-19 ENCOUNTER — TRANSFERRED RECORDS (OUTPATIENT)
Dept: HEALTH INFORMATION MANAGEMENT | Facility: CLINIC | Age: 65
End: 2025-02-19
Payer: MEDICARE

## 2025-02-19 DIAGNOSIS — Z94.2 LUNG REPLACED BY TRANSPLANT (H): Primary | ICD-10-CM

## 2025-02-19 NOTE — TELEPHONE ENCOUNTER
MyChart message received from patient wanting to confirm that a cyclosporine level was ordered rather than a tacrolimus level for her appt next week, writer corrected orders at this time. Pt asking for a VBG as well, will defer to Kaylin at appt, can always be added on if needed.

## 2025-02-27 ENCOUNTER — DOCUMENTATION ONLY (OUTPATIENT)
Dept: TRANSPLANT | Facility: CLINIC | Age: 65
End: 2025-02-27
Payer: MEDICARE

## 2025-02-27 DIAGNOSIS — Z94.2 S/P LUNG TRANSPLANT (H): ICD-10-CM

## 2025-02-27 NOTE — PROGRESS NOTES
Pt seen in clinic w/Kaylin on 1/15, recommended follow up in 6 weeks d/t ongoing failure to thrive. Pt was scheduled to see Kaylin on 2/26, pt cancelled that appointment requesting a different provider moving forward. Patient was offered an appt on 3/24 and 3/25 with Dr. Castillo. Pt declined both appt options d/t dialysis schedule and timing of appointment, patient had been informed there was limited scheduling options. Pt is now scheduled to see Dr. Huff on 4/22, two months from initial follow up appointment. Coordinator will continue to follow up with patient in the meantime.

## 2025-03-04 DIAGNOSIS — Z94.2 S/P LUNG TRANSPLANT (H): ICD-10-CM

## 2025-03-04 NOTE — PROGRESS NOTES
Meme message sent patient requesting monthly standing labs and PRN as needed. Orders faxed to local clinic.

## 2025-03-10 ENCOUNTER — HOME INFUSION (OUTPATIENT)
Dept: HOME HEALTH SERVICES | Facility: HOME HEALTH | Age: 65
End: 2025-03-10
Payer: MEDICARE

## 2025-03-10 NOTE — PROGRESS NOTES
[facility]  Start of Care/Resumption of Care Note    I BRONSON    DX: Lung replaced by transplant (H) [Z94.2]; Acute respiratory failure with hypercapnia (H) [J96.02]; COPD (chronic obstructive pulmonary disease) (H) [J44.9]; Gastroparesis [K31.84]     Therapy: Enteral    Next Dose Due: Daily    Delivery plan: Not required at discharge    In-basket sent to [facility] Scheduling, requesting visit on NA    Per [facility] care plan, labs are due on NA    Nursing plan: Enteral Only. [facility] to follow for nursing until agency takes over case on NA.     Teaching Plan: Liaison Teach Done?: No    Other Info: Pt reports she does not require any supplies or formula    Sara Robertson RN 03/10/25

## 2025-03-13 ENCOUNTER — HOME INFUSION BILLING (OUTPATIENT)
Dept: HOME HEALTH SERVICES | Facility: HOME HEALTH | Age: 65
End: 2025-03-13
Payer: MEDICARE

## 2025-03-26 DIAGNOSIS — Z94.2 S/P LUNG TRANSPLANT (H): ICD-10-CM

## 2025-03-26 NOTE — LETTER
PHYSICIAN ORDERS      DATE & TIME ISSUED: 2025 9:20 AM  PATIENT NAME: Sofie Rodriguez   : 1960     [facility] MR# [if applicable]: [id number]     DIAGNOSIS:  Lung Transplant  Z94.2    Updated orders:   Please draw the following labs the week of 3/24/2025  -CMP  -CBC w/platelets and dif  -Mg  -Phos  -Cyclosporine drug level      Any questions please call: RIYA Rahman Transplant Coordinator     Please fax these results to [phone].         Claribel Franks MD  Pulmonary Disease        Nostril Rim Text: The closure involved the nostril rim.

## 2025-03-26 NOTE — LETTER
PHYSICIAN ORDERS      DATE & TIME ISSUED: 2025 8:47 AM  PATIENT NAME: Sofie Rodriguez   : 1960     [facility] MR# [if applicable]: [id number]     DIAGNOSIS:  Lung Transplant  Z94.2    Please draw the following labs on 3/26:  -BMP  -CBC w/platelets and dif  -Magnesium  -Phos  -Cyclosporine drug level  -CMV DNA quant    Any questions please call: RIYA Rahman Transplant Coordinator     Please fax these results to [phone].         Claribel Franks MD  Pulmonary Disease

## 2025-03-26 NOTE — PROGRESS NOTES
[place] message received from patient, she recently discharged from the local hospital and is following up w/her PCP today. Pt will get standing transplant labs this Friday.

## 2025-03-31 DIAGNOSIS — Z94.2 S/P LUNG TRANSPLANT (H): ICD-10-CM

## 2025-04-08 DIAGNOSIS — Z94.2 S/P LUNG TRANSPLANT (H): ICD-10-CM

## 2025-04-08 DIAGNOSIS — Z94.2 LUNG REPLACED BY TRANSPLANT (H): ICD-10-CM

## 2025-04-08 DIAGNOSIS — Z94.2 LUNG TRANSPLANT STATUS, BILATERAL (H): ICD-10-CM

## 2025-04-08 RX ORDER — PREDNISONE 5 MG/1
5 TABLET ORAL 2 TIMES DAILY
Qty: 60 TABLET | Refills: 11 | Status: SHIPPED | OUTPATIENT
Start: 2025-04-08 | End: 2025-04-09

## 2025-04-08 RX ORDER — LIDOCAINE 4 G/G
1 PATCH TOPICAL EVERY 24 HOURS
Qty: 30 PATCH | Refills: 3 | Status: CANCELLED | OUTPATIENT
Start: 2025-04-08

## 2025-04-08 RX ORDER — LIDOCAINE 4 G/G
1 PATCH TOPICAL EVERY 24 HOURS
Qty: 30 PATCH | Refills: 3 | Status: SHIPPED | OUTPATIENT
Start: 2025-04-08 | End: 2025-04-09

## 2025-04-09 DIAGNOSIS — Z94.2 LUNG REPLACED BY TRANSPLANT (H): ICD-10-CM

## 2025-04-09 DIAGNOSIS — Z94.2 LUNG TRANSPLANT STATUS, BILATERAL (H): ICD-10-CM

## 2025-04-09 DIAGNOSIS — Z94.2 S/P LUNG TRANSPLANT (H): ICD-10-CM

## 2025-04-09 RX ORDER — PREDNISONE 5 MG/1
5 TABLET ORAL 2 TIMES DAILY
Qty: 60 TABLET | Refills: 11 | Status: SHIPPED | OUTPATIENT
Start: 2025-04-09

## 2025-04-09 RX ORDER — LIDOCAINE 4 G/G
1 PATCH TOPICAL EVERY 24 HOURS
Qty: 30 PATCH | Refills: 3 | Status: SHIPPED | OUTPATIENT
Start: 2025-04-09

## 2025-04-16 DIAGNOSIS — Z94.2 S/P LUNG TRANSPLANT (H): ICD-10-CM

## 2025-04-16 RX ORDER — CYCLOSPORINE 100 MG/1
100 CAPSULE, LIQUID FILLED ORAL DAILY
Qty: 30 CAPSULE | Refills: 11 | Status: SHIPPED | OUTPATIENT
Start: 2025-04-16

## 2025-04-16 RX ORDER — CYCLOSPORINE 25 MG/1
75 CAPSULE, LIQUID FILLED ORAL 2 TIMES DAILY
Qty: 180 CAPSULE | Refills: 11 | Status: SHIPPED | OUTPATIENT
Start: 2025-04-16

## 2025-04-21 DIAGNOSIS — Z94.2 S/P LUNG TRANSPLANT (H): ICD-10-CM

## 2025-04-21 DIAGNOSIS — K31.84 GASTROPARESIS: ICD-10-CM

## 2025-04-21 RX ORDER — PRUCALOPRIDE 1 MG/1
1 TABLET ORAL DAILY
Qty: 30 TABLET | Refills: 1 | Status: SHIPPED | OUTPATIENT
Start: 2025-04-21 | End: 2025-04-21

## 2025-04-21 RX ORDER — PRUCALOPRIDE 1 MG/1
1 TABLET ORAL DAILY
Qty: 30 TABLET | Refills: 1 | Status: SHIPPED | OUTPATIENT
Start: 2025-04-21 | End: 2025-04-23

## 2025-04-22 ENCOUNTER — TELEPHONE (OUTPATIENT)
Dept: TRANSPLANT | Facility: CLINIC | Age: 65
End: 2025-04-22

## 2025-04-22 DIAGNOSIS — Z94.2 S/P LUNG TRANSPLANT (H): ICD-10-CM

## 2025-04-22 NOTE — TELEPHONE ENCOUNTER
Pt cancelled appointment today w/transplant team. Pt contacted via phone to check in. Pt reports she has had two episodes of loose incontinent stools. Pt reported that she didn't want to be on the phone any longer and she will call back tomorrow.     Dr. Huff updated.

## 2025-04-22 NOTE — TELEPHONE ENCOUNTER
CMP and Magnesium blood work linked to labs on 04/25/2025.     SOT LUNG INTAKE [facility]    October 1, 2020    Referring Provider: Josh Patel  Primary Provider: Vinny Berrios  Specialist: no  Diagnosis:COPD  Source/Facility: [facility]    Smoking/nicotine use history: quit 3 months ago  Alcohol use history: 1-2/month  Drug use history: hx marijuana and meth- unsure when she quit thinks 2-3 yrs ago  Cancer history:  no  Cardiac history:    BMI:20.6    Notes: Due to COVID, verbal consent received for Care Everywhere Authorization from the patient during this call.    Is patient in a group home/assisted living? no  Does patient have a guardian? no    Referral intake process completed.  Patient is aware that after financial approval is received, medical records will be requested.   Patient confirmed for a callback from transplant coordinator on February 24, 2021. (within 2 weeks)  Tentative evaluation date TBD. (within 4 weeks)    Confirmed coordinator will discuss evaluation process in more detail at the time of their call.   Patient is aware of the need to arrange age appropriate cancer screening, vaccinations, and dental care.  Reminded patient to complete questionnaire, complete medical records release, and review packet prior to evaluation visit .  Assessed patient for special needs (ie--wheelchair, assistance, guardian, and ):  none   Patient instructed to call [phone] with questions.

## 2025-04-23 DIAGNOSIS — K31.84 GASTROPARESIS: ICD-10-CM

## 2025-04-23 RX ORDER — PRUCALOPRIDE 1 MG/1
1 TABLET ORAL DAILY
Qty: 30 TABLET | Refills: 1 | Status: SHIPPED | OUTPATIENT
Start: 2025-04-23

## 2025-04-28 NOTE — PATIENT INSTRUCTIONS
"Outpatient Behavioral Health Psychotherapy Treatment Plan    Esha Moreira  1978     Date of Initial Psychotherapy Assessment: 11/11/2024    Date of Current Treatment Plan: 04/28/25  Treatment Plan Target Date: 10/28/2025  Treatment Plan Expiration Date: 10/28/2025    Diagnosis:   1. Bipolar 1 disorder, mixed (HCC)            Area(s) of Need: Decrease of depression cycles.     Long Term Goal 1 (in the client's own words): \"Not to have the depression cycles so often\"    Stage of Change: Action    Target Date for completion: 10/28/2025     Anticipated therapeutic modalities: CBT, Client Centered Therapy, Solution Focused Therapy, Mindfulness Based Therapy.      People identified to complete this goal: Clinician and client      Objective 1: (identify the means of measuring success in meeting the objective): Client will attend all counseling sessions as scheduled.       Objective 2: (identify the means of measuring success in meeting the objective): Over the next 6 months client will obtain a lesser PHQ9 score than she did today.      Objective 3: (identify the means of measuring success in meeting the objective): Over the next 6 months client will learn and utilize at least 3 new coping skills.      Objective 4: (identify the means of measuring success in meeting the objective):  During depressive episodes client will still complete ADL and do at least one productive thing a day.       Long Term Goal 2 (in the client's own words): \"Handle my anger better\"    Stage of Change: Action    Target Date for completion: 10/28/2025     Anticipated therapeutic modalities: CBT, Client Centered Therapy, Solution Focused Therapy, Mindfulness Based Therapy.      People identified to complete this goal: Client and clinician      Objective 1: (identify the means of measuring success in meeting the objective): Client will attend all counseling sessions as scheduled.       Objective 2: (identify the means of measuring success " Patient Instructions  1. Continue to hydrate with 60-70 oz fluids daily.  2. Continue to exercise daily or most days of the week.  3. Follow up with your primary care provider for annual gender health maintenance procedures.  4. Follow up with colonoscopy schedule.  5. Follow up with annual dermatology visits.  6. It doesn't seem like the COVID vaccine is working well in lung transplant patients. A number of lung transplant patients have gotten sick with COVID even after receiving the vaccines. Based on our recent experience, it can be life-threatening to get COVID  even after being vaccinated. Please continue to act like you did not get the COVID vaccine - social distancing, wearing a mask, good hand hygiene, etc. If the people around you are vaccinated, it will help reduce the risk of you getting COVID. All members of your household should be vaccinated.  7. Follow up to be determined. We will touch base with Dr. Navarro.     Next transplant clinic appointment:  with CXR, labs and PFTs  Next lab draw: Friday     AVS printed at time of check out    ~~~~~~~~~~~~~~~~~~~~~~~~~    Thoracic Transplant Office phone [phone], fax [phone]    Office Hours 8:30 - 5:00     For after-hours urgent issues, please dial [phone] and ask the  to page the Thoracic Transplant Coordinator On-Call.   --------------------  To expedite your medication refill(s), please contact your pharmacy and have them fax a refill request to: [phone]    *Please allow 3 business days for routine medication refills.  *Please allow 5 business days for controlled substance medication refills.    **For Diabetic medications and supplies refill(s), please contact your pharmacy and have them contact your Endocrine team.  --------------------  For scheduling appointments call [phone].  --------------------  Please Note: If you are active on [facility], all future test results will be sent by Vingle message only, and will no longer be  called to patient. You may also receive communication directly from your physician.     in meeting the objective): Over the next 6 months client will learn and utilize at least 3 new mindfulness skills for anger.     Objective 3: (identify the means of measuring success in meeting the objective): Over the next 6 months client will learn how to identify the emotions beneath the anger (Anger Iceberg).      I am currently under the care of a [facility] psychiatric provider: no    My [facility] psychiatric provider is: N/A    I am currently taking psychiatric medications: No    I feel that I will be ready for discharge from mental health care when I reach the following (measurable goal/objective): when I reach my goals    For children and adults who have a legal guardian:   Has there been any change to custody orders and/or guardianship status? NA. If yes, attach updated documentation.    I have reviewed my Crisis Plan and have been offered a copy of this plan    Behavioral Health Treatment Plan [facility]: Diagnosis and Treatment Plan explained to Esha Moreira acknowledges an understanding of their diagnosis. Esha Moreira agrees to this treatment plan.    I have been offered a copy of this Treatment Plan. yes

## 2025-04-30 ENCOUNTER — TELEPHONE (OUTPATIENT)
Dept: TRANSPLANT | Facility: CLINIC | Age: 65
End: 2025-04-30
Payer: MEDICARE

## 2025-04-30 DIAGNOSIS — Z94.2 S/P LUNG TRANSPLANT (H): ICD-10-CM

## 2025-04-30 NOTE — TELEPHONE ENCOUNTER
[facility]  called regarding the Cyclosporine or to have Dr. Huff call the [facility] to discuss care. Writer did not have the new provider to provider number at the time of the call. The RNCC can give [phone] for the provider to provider if needed.

## 2025-04-30 NOTE — TELEPHONE ENCOUNTER
Call received from Karissa  w/[facility]. Pt is currently admitted in their ICU, on bipap w/pressure support. Concern for active infection, would like to touch base w/pulmonary provider.     Dr. Paredes updated, she notes that she spoke w/Sofie's provider an hour ago. Contact number given to Dr. Paredes if needed.

## 2025-05-07 ENCOUNTER — POST MORTEM DOCUMENTATION (OUTPATIENT)
Dept: TRANSPLANT | Facility: CLINIC | Age: 65
End: 2025-05-07
Payer: MEDICARE

## 2025-05-07 DIAGNOSIS — Z94.2 S/P LUNG TRANSPLANT (H): ICD-10-CM

## 2025-05-07 NOTE — PROGRESS NOTES
Received notification on 2025 at [id number] of patient's death from Care Everywhere search.  Place of death was reported as outside hospital: [facility]  Graft status at the time of death was reported as Unknown.  Additional information: Sofie Rodriguez is a 64 Y female with hx of b/l lung transplant on chronic immunosuppression admitted on 2025 for hypotension, AMS, acidosis, concern for septic shock. Patient requiring significant more acute needs resulting in patient being transition to comfort focused care the morning of . Patient maintained comfort on comfort focused care until passing away the evening of .   TIS verification is: Pending  The Transplant Office has been notified that patient is . The Post Mortem Encounter has been completed. Notifications have been sent to the Care team and Social Work.   Instructions have been sent to cancel pending appointments. Writer will send sympathy care to family.

## (undated) DEVICE — CUP AND LID 2PK 2OZ STERILE  SSK9006A

## (undated) DEVICE — CAP HALO MED BARRX

## (undated) DEVICE — SU ETHIBOND 5 LRDA 30" B499T

## (undated) DEVICE — DRAIN SYSTEM VALVE NG TUBE CLSD RSVR OVERFLOW ENFIT 43-4100

## (undated) DEVICE — NDL YUEH CENTESIS 5FRX10CM G09490 DTVN-5.0-19-10.0-YUEH

## (undated) DEVICE — DRSG WOUND VAC SPONGE MED BLACK M8275052/5

## (undated) DEVICE — SLEEVE TR BAND RADIAL COMPRESSION DEVICE 24CM TRB24-REG

## (undated) DEVICE — SU VICRYL 3-0 SH 27" UND J416H

## (undated) DEVICE — ENDO TUBING CO2 SMARTCAP STERILE DISP 100145CO2EXT

## (undated) DEVICE — CLIP HORIZON SM RED WIDE SLOT 001201

## (undated) DEVICE — SU SILK 0 TIE 6X30" A306H

## (undated) DEVICE — SOL NACL 0.9% IRRIG 3000ML BAG 2B7477

## (undated) DEVICE — LINEN TOWEL PACK X6 WHITE 5487

## (undated) DEVICE — SURGICEL HEMOSTAT 2X3" 1953

## (undated) DEVICE — ESU ELEC BLADE E-SEP INSULATED NEPTUNE 70MM 0703-070-002

## (undated) DEVICE — DEFIB PRO-PADZ LVP LQD GEL ADULT 8900-2105-01

## (undated) DEVICE — ESU ELEC BLADE 6" COATED/INSULATED E1455-6

## (undated) DEVICE — KIT CONNECTOR FOR OLYMPUS ENDOSCOPES DEFENDO 100310

## (undated) DEVICE — ENDO FUSION OMNI-TOME G31903

## (undated) DEVICE — CANISTER WOUND VAC W/GEL 1000ML M8275093/5

## (undated) DEVICE — Device

## (undated) DEVICE — ENDO FUSION OMNI-TOME 21 FS-OMNI-21 G48675

## (undated) DEVICE — SU PROLENE 5-0 RB-1DA 36"  8556H

## (undated) DEVICE — ENDO SNARE POLYPECTOMY OVAL 15MM LOOP SD-240U-15

## (undated) DEVICE — BASIN SET SINGLE STERILE 13752-624

## (undated) DEVICE — TUBE TRANS GASTROJEJUNOSTOMY ENFIT 18FRX45CM 8250-18

## (undated) DEVICE — LINEN TOWEL PACK X30 5481

## (undated) DEVICE — VESSEL LOOPS RED MINI 31145710

## (undated) DEVICE — TUBING MEDRAD 48" HIGH PRESSURE MX694

## (undated) DEVICE — CATH FOGARTY EMBOLECTOMY 2FR 60CM LATEX 120602FP

## (undated) DEVICE — DRAPE SHEET REV FOLD 3/4 9349

## (undated) DEVICE — DRAPE FLUID WARMING 52 X 60" ORS-321

## (undated) DEVICE — DRSG DRAIN 4X4" 7086

## (undated) DEVICE — ESU FCP MONOPOLAR COAGRASPER 5MMX165CM FD-410LR

## (undated) DEVICE — SU SILK 4-0 TIE 12X30" A303H

## (undated) DEVICE — KIT ENDO FIRST STEP DISINFECTANT 200ML W/POUCH EP-4

## (undated) DEVICE — STPL SKIN 35W 059037

## (undated) DEVICE — ESU KNIFE 0.4X2.6MMX165CM KD-611L

## (undated) DEVICE — PACK ENDOSCOPY GI CUSTOM UMMC

## (undated) DEVICE — SPONGE RAY-TEC 4X8" 7318

## (undated) DEVICE — ESU GROUND PAD ADULT W/CORD E7507

## (undated) DEVICE — PROTECTOR ARM ONE-STEP TRENDELENBURG 40418

## (undated) DEVICE — CATH ANGIO SUPERTORQUE PLUS JL4 6FRX100CM 533620

## (undated) DEVICE — ESU BIPOLAR SEALER AQUAMANTYS 6MM 23-112-1

## (undated) DEVICE — SU PLEDGET SOFT TFE 3/8"X3/26"X1/16" PCP40

## (undated) DEVICE — SPONGE SURGIFOAM 100 1974

## (undated) DEVICE — WIRE GUIDE 0.025"X270CM ANG VISIGLIDE G-240-2527A

## (undated) DEVICE — GUIDEWIRE NOVAGOLD .018X260CM STR TIP M00552000

## (undated) DEVICE — BIOPSY VALVE BIOSHIELD 00711135

## (undated) DEVICE — SU SILK 2-0 TIE 12X30" A305H

## (undated) DEVICE — NDL 15GA 1.5" 8881200029

## (undated) DEVICE — LEFT HEART PK FAIRVIEW UNIV MEDICAL CENTER

## (undated) DEVICE — SOL WATER IRRIG 1000ML BOTTLE 2F7114

## (undated) DEVICE — ENDO BITE BLOCK ADULT OMNI-BLOC

## (undated) DEVICE — SURGICEL HEMOSTAT 4X8" 1952

## (undated) DEVICE — DEVICE ANCHORING FLEXI-TRAK 37449

## (undated) DEVICE — CATH ANGIO SUPERTORQUE PLUS JR4 6FRX100CM 533621

## (undated) DEVICE — GUIDEWIRE TERUMO .035X260 3CM STR TIP GS3504

## (undated) DEVICE — SUCTION MANIFOLD NEPTUNE 2 SYS 4 PORT 0702-020-000

## (undated) DEVICE — SYR 03ML LL W/O NDL 309657

## (undated) DEVICE — DRAIN CHEST TUBE RIGHT ANGLED 28FR 8128

## (undated) DEVICE — NDL SCLEROTHERAPY 25GA CARR-LOCK  00711811

## (undated) DEVICE — STPL POWERED ECHELON VASC 35MM PVE35A

## (undated) DEVICE — GLOVE PROTEXIS POWDER FREE SMT 7.5  2D72PT75X

## (undated) DEVICE — TUBING SUCTION DRAINAGE PLEURAL DUAL 8884714200

## (undated) DEVICE — DRSG GAUZE 4X4" TRAY 6939

## (undated) DEVICE — DRAIN CHEST TUBE 32FR STR 8032

## (undated) DEVICE — WIPES FOLEY CARE SURESTEP PROVON DFC100

## (undated) DEVICE — CONNECTOR STOPCOCK 3 WAY MALE LL MX4311L

## (undated) DEVICE — SOL NACL 0.9% IRRIG 1000ML BOTTLE 2F7124

## (undated) DEVICE — SU VICRYL 3-0 FS-1 27" J442H

## (undated) DEVICE — CARTRIDGE ERBEJET PUMP 20150-300

## (undated) DEVICE — SU ETHIBOND 0 CT-1 CR 8X18" CX21D

## (undated) DEVICE — LINEN GOWN XLG 5407

## (undated) DEVICE — SU VICRYL 2-0 CT-1 27" UND J259H

## (undated) DEVICE — SU PROLENE 6-0 C-1DA 30" 8706H

## (undated) DEVICE — DRAPE IOBAN INCISE 23X17" 6650EZ

## (undated) DEVICE — SU PROLENE 4-0 SHDA 36" 8521H

## (undated) DEVICE — SU OVERSTITCH 2-0 POLYPROPYLENE APOLLO PLY-G02-020-APL

## (undated) DEVICE — DRSG 10X3.5IN MDPR POR CLTH NADH ADH WHT STRL LF 8X1.75IN

## (undated) DEVICE — PAD CHUX UNDERPAD 23X24" 7136

## (undated) DEVICE — WIRE GUIDE TRACER METRO DIRECT .021"X260CM STR TIP G55705

## (undated) DEVICE — SHTH INTRO 0.021IN ID 6FR DIA

## (undated) DEVICE — DRSG DRAIN 2X2" 7087

## (undated) DEVICE — GOWN XLG DISP 9545

## (undated) DEVICE — CONNECTOR MALE TO MALE LL

## (undated) DEVICE — ENDO CAP AND TUBING STERILE FOR ENDOGATOR  100130

## (undated) DEVICE — BALLOON EXTRACTION 15X1950MM 3.2MM TL B-V243Q-A

## (undated) DEVICE — SU STEEL 6 CCS 4X18" M654G

## (undated) DEVICE — PREP CHLORAPREP 26ML TINTED HI-LITE ORANGE 930815

## (undated) DEVICE — MANIFOLD KIT ANGIO AUTOMATED 014613

## (undated) DEVICE — ESU LIGASURE IMPACT OPEN SEALER/DVDR CVD LG JAW LF4418

## (undated) DEVICE — SU CINCH OVERSTITCH SINGLE USE APOLLO CNH-G01-000

## (undated) DEVICE — STOPCOCK ANGIO 1-WAY MARQUIS MLL  H1RC

## (undated) DEVICE — COVER ULTRASOUND PROBE W/GEL FLEXI-FEEL 6"X58" LF  25-FF658

## (undated) DEVICE — PACK ADULT HEART UMMC PV15CG92D

## (undated) DEVICE — GLOVE GAMMEX NEOPRENE ULTRA SZ 7 LF 8514

## (undated) DEVICE — POSITIONER ASSIST ESSTECH 3S T401210S

## (undated) DEVICE — PACK GOWN 3/PK DISP XL SBA32GPFCB

## (undated) DEVICE — BLADE SAW OFFSET FAN STRK 30X30X0.38MM 5400-134-279

## (undated) DEVICE — CONNECTOR BLAKE DRAIN SGL BCC1

## (undated) DEVICE — ADH SKIN CLOSURE PREMIERPRO EXOFIN 1.0ML 3470

## (undated) DEVICE — SU MONOCRYL 4-0 PS-2 27" UND Y426H

## (undated) DEVICE — CLIP HORIZON LG ORANGE 004200

## (undated) DEVICE — DRSG TEGADERM 2 3/8X2 3/4" 1624W

## (undated) DEVICE — GEL ULTRASOUND AQUASONIC 20GM 01-01

## (undated) DEVICE — DRSG ACTICOAT 7 4X5" 20141

## (undated) DEVICE — SURGICEL POWDER ABSORBABLE HEMOSTAT 3GM 3013SP

## (undated) DEVICE — CLIP HORIZON MED BLUE 002200

## (undated) DEVICE — VESSEL LOOPS YELLOW MAXI 31145694

## (undated) DEVICE — ESU PENCIL SMOKE EVAC W/ROCKER SWITCH 0703-047-000

## (undated) DEVICE — SUCTION CATH AIRLIFE TRI-FLO W/CONTROL PORT 14FR  T60C

## (undated) DEVICE — ENDO DEVICE LOCKING AND BIOPSY CAP M00545261

## (undated) DEVICE — SU ETHIBOND 3-0 BBDA 36" X588H

## (undated) DEVICE — GLOVE LINER COTTON MED 32-2076

## (undated) DEVICE — SPECIMEN TRAP MUCOUS 40ML LUKI C30200A

## (undated) DEVICE — SUCTION DRY CHEST DRAIN OASIS 3600-100

## (undated) DEVICE — SYR 10ML LL W/O NDL 302995

## (undated) DEVICE — DRSG ABDOMINAL 07 1/2X8" 7197D

## (undated) DEVICE — RX SURGIFLO HEMOSTATIC MATRIX W/THROMBIN 8ML 2994

## (undated) DEVICE — LINEN TOWEL PACK X5 5464

## (undated) DEVICE — SUTURE BOOTS 051003PBX

## (undated) DEVICE — TUBING SUCTION MEDI-VAC SOFT 3/16"X20' N520A

## (undated) DEVICE — SU ETHIBOND 2-0 SHDA 30" X563H

## (undated) DEVICE — SU PROLENE 7-0 BV-1DA 30" 8703H

## (undated) DEVICE — SU SILK 3-0 TIE 12X30" A304H

## (undated) DEVICE — INTRO SHEATH 7FRX10CM PINNACLE RSS702

## (undated) DEVICE — KIT HAND CONTROL ACIST 014644 AR-P54

## (undated) DEVICE — GW VASC .035IN DIA 260CML 7CML 3 MM RADIUS J CURVE 502455

## (undated) DEVICE — SPONGE LAP 18X18" X8435

## (undated) DEVICE — SU PROLENE 4-0 RB-1DA 36" 8557H

## (undated) DEVICE — SYR 30ML LL W/O NDL 302832

## (undated) DEVICE — SYR BULB IRRIG DOVER 60 ML LATEX FREE 67000

## (undated) DEVICE — SU VICRYL 0 CTX 36" J370H

## (undated) DEVICE — CONTAINER EVACUATOR GLASS VACUTAINER 1000ML 1A8504

## (undated) RX ORDER — LIDOCAINE HYDROCHLORIDE 10 MG/ML
INJECTION, SOLUTION EPIDURAL; INFILTRATION; INTRACAUDAL; PERINEURAL
Status: DISPENSED
Start: 2023-07-12

## (undated) RX ORDER — LIDOCAINE HYDROCHLORIDE AND EPINEPHRINE 10; 10 MG/ML; UG/ML
INJECTION, SOLUTION INFILTRATION; PERINEURAL
Status: DISPENSED
Start: 2023-04-19

## (undated) RX ORDER — LIDOCAINE HYDROCHLORIDE 10 MG/ML
INJECTION, SOLUTION EPIDURAL; INFILTRATION; INTRACAUDAL; PERINEURAL
Status: DISPENSED
Start: 2023-01-25

## (undated) RX ORDER — NICARDIPINE HCL-0.9% SOD CHLOR 1 MG/10 ML
SYRINGE (ML) INTRAVENOUS
Status: DISPENSED
Start: 2021-06-30

## (undated) RX ORDER — LIDOCAINE HYDROCHLORIDE 10 MG/ML
INJECTION, SOLUTION EPIDURAL; INFILTRATION; INTRACAUDAL; PERINEURAL
Status: DISPENSED
Start: 2022-09-15

## (undated) RX ORDER — FENTANYL CITRATE 50 UG/ML
INJECTION, SOLUTION INTRAMUSCULAR; INTRAVENOUS
Status: DISPENSED
Start: 2022-07-19

## (undated) RX ORDER — HEPARIN SODIUM 1000 [USP'U]/ML
INJECTION, SOLUTION INTRAVENOUS; SUBCUTANEOUS
Status: DISPENSED
Start: 2022-09-26

## (undated) RX ORDER — PROPOFOL 10 MG/ML
INJECTION, EMULSION INTRAVENOUS
Status: DISPENSED
Start: 2022-06-28

## (undated) RX ORDER — FENTANYL CITRATE 50 UG/ML
INJECTION, SOLUTION INTRAMUSCULAR; INTRAVENOUS
Status: DISPENSED
Start: 2023-07-12

## (undated) RX ORDER — CEFAZOLIN SODIUM 2 G/100ML
INJECTION, SOLUTION INTRAVENOUS
Status: DISPENSED
Start: 2022-09-26

## (undated) RX ORDER — LIDOCAINE HYDROCHLORIDE 20 MG/ML
SOLUTION OROPHARYNGEAL
Status: DISPENSED
Start: 2021-06-15

## (undated) RX ORDER — LIDOCAINE HYDROCHLORIDE 20 MG/ML
JELLY TOPICAL
Status: DISPENSED
Start: 2022-07-27

## (undated) RX ORDER — LIDOCAINE HYDROCHLORIDE 10 MG/ML
INJECTION, SOLUTION EPIDURAL; INFILTRATION; INTRACAUDAL; PERINEURAL
Status: DISPENSED
Start: 2023-04-19

## (undated) RX ORDER — HEPARIN SODIUM 1000 [USP'U]/ML
INJECTION, SOLUTION INTRAVENOUS; SUBCUTANEOUS
Status: DISPENSED
Start: 2022-06-28

## (undated) RX ORDER — FENTANYL CITRATE 50 UG/ML
INJECTION, SOLUTION INTRAMUSCULAR; INTRAVENOUS
Status: DISPENSED
Start: 2022-06-28

## (undated) RX ORDER — CEFAZOLIN SODIUM 2 G/100ML
INJECTION, SOLUTION INTRAVENOUS
Status: DISPENSED
Start: 2022-07-27

## (undated) RX ORDER — LIDOCAINE HYDROCHLORIDE 20 MG/ML
JELLY TOPICAL
Status: DISPENSED
Start: 2022-08-31

## (undated) RX ORDER — FENTANYL CITRATE-0.9 % NACL/PF 10 MCG/ML
PLASTIC BAG, INJECTION (ML) INTRAVENOUS
Status: DISPENSED
Start: 2022-06-28

## (undated) RX ORDER — FENTANYL CITRATE 50 UG/ML
INJECTION, SOLUTION INTRAMUSCULAR; INTRAVENOUS
Status: DISPENSED
Start: 2022-07-02

## (undated) RX ORDER — LIDOCAINE HYDROCHLORIDE 20 MG/ML
JELLY TOPICAL
Status: DISPENSED
Start: 2023-07-12

## (undated) RX ORDER — FENTANYL CITRATE 50 UG/ML
INJECTION, SOLUTION INTRAMUSCULAR; INTRAVENOUS
Status: DISPENSED
Start: 2024-02-14

## (undated) RX ORDER — PAPAVERINE HYDROCHLORIDE 30 MG/ML
INJECTION INTRAMUSCULAR; INTRAVENOUS
Status: DISPENSED
Start: 2022-07-02

## (undated) RX ORDER — PROPOFOL 10 MG/ML
INJECTION, EMULSION INTRAVENOUS
Status: DISPENSED
Start: 2022-07-02

## (undated) RX ORDER — LIDOCAINE HYDROCHLORIDE 10 MG/ML
INJECTION, SOLUTION EPIDURAL; INFILTRATION; INTRACAUDAL; PERINEURAL
Status: DISPENSED
Start: 2024-03-06

## (undated) RX ORDER — LIDOCAINE HYDROCHLORIDE 10 MG/ML
INJECTION, SOLUTION EPIDURAL; INFILTRATION; INTRACAUDAL; PERINEURAL
Status: DISPENSED
Start: 2022-07-19

## (undated) RX ORDER — FENTANYL CITRATE-0.9 % NACL/PF 10 MCG/ML
PLASTIC BAG, INJECTION (ML) INTRAVENOUS
Status: DISPENSED
Start: 2023-10-09

## (undated) RX ORDER — LIDOCAINE HYDROCHLORIDE 10 MG/ML
INJECTION, SOLUTION EPIDURAL; INFILTRATION; INTRACAUDAL; PERINEURAL
Status: DISPENSED
Start: 2022-07-27

## (undated) RX ORDER — HEPARIN SODIUM,PORCINE 10 UNIT/ML
VIAL (ML) INTRAVENOUS
Status: DISPENSED
Start: 2024-02-14

## (undated) RX ORDER — FENTANYL CITRATE-0.9 % NACL/PF 10 MCG/ML
PLASTIC BAG, INJECTION (ML) INTRAVENOUS
Status: DISPENSED
Start: 2022-08-11

## (undated) RX ORDER — LIDOCAINE HYDROCHLORIDE 20 MG/ML
INJECTION, SOLUTION EPIDURAL; INFILTRATION; INTRACAUDAL; PERINEURAL
Status: DISPENSED
Start: 2022-07-02

## (undated) RX ORDER — SODIUM CHLORIDE 9 MG/ML
INJECTION, SOLUTION INTRAVENOUS
Status: DISPENSED
Start: 2021-06-30

## (undated) RX ORDER — EPHEDRINE SULFATE 50 MG/ML
INJECTION, SOLUTION INTRAMUSCULAR; INTRAVENOUS; SUBCUTANEOUS
Status: DISPENSED
Start: 2022-06-28

## (undated) RX ORDER — LIDOCAINE HYDROCHLORIDE 10 MG/ML
INJECTION, SOLUTION EPIDURAL; INFILTRATION; INTRACAUDAL; PERINEURAL
Status: DISPENSED
Start: 2024-02-14

## (undated) RX ORDER — LIDOCAINE HYDROCHLORIDE 20 MG/ML
SOLUTION OROPHARYNGEAL
Status: DISPENSED
Start: 2023-07-12

## (undated) RX ORDER — LIDOCAINE HYDROCHLORIDE AND EPINEPHRINE 10; 10 MG/ML; UG/ML
INJECTION, SOLUTION INFILTRATION; PERINEURAL
Status: DISPENSED
Start: 2023-07-12

## (undated) RX ORDER — LIDOCAINE HYDROCHLORIDE 20 MG/ML
JELLY TOPICAL
Status: DISPENSED
Start: 2024-04-08

## (undated) RX ORDER — FENTANYL CITRATE 50 UG/ML
INJECTION, SOLUTION INTRAMUSCULAR; INTRAVENOUS
Status: DISPENSED
Start: 2021-06-30

## (undated) RX ORDER — FENTANYL CITRATE 50 UG/ML
INJECTION, SOLUTION INTRAMUSCULAR; INTRAVENOUS
Status: DISPENSED
Start: 2023-10-09

## (undated) RX ORDER — ALBUTEROL SULFATE 0.83 MG/ML
SOLUTION RESPIRATORY (INHALATION)
Status: DISPENSED
Start: 2021-06-14

## (undated) RX ORDER — LIDOCAINE HYDROCHLORIDE 10 MG/ML
INJECTION, SOLUTION EPIDURAL; INFILTRATION; INTRACAUDAL; PERINEURAL
Status: DISPENSED
Start: 2022-08-02

## (undated) RX ORDER — HEPARIN SODIUM 1000 [USP'U]/ML
INJECTION, SOLUTION INTRAVENOUS; SUBCUTANEOUS
Status: DISPENSED
Start: 2021-06-30

## (undated) RX ORDER — DIPHENHYDRAMINE HYDROCHLORIDE 50 MG/ML
INJECTION INTRAMUSCULAR; INTRAVENOUS
Status: DISPENSED
Start: 2023-04-19

## (undated) RX ORDER — FENTANYL CITRATE 50 UG/ML
INJECTION, SOLUTION INTRAMUSCULAR; INTRAVENOUS
Status: DISPENSED
Start: 2022-08-02

## (undated) RX ORDER — LIDOCAINE HYDROCHLORIDE 10 MG/ML
INJECTION, SOLUTION EPIDURAL; INFILTRATION; INTRACAUDAL; PERINEURAL
Status: DISPENSED
Start: 2022-07-25

## (undated) RX ORDER — NITROGLYCERIN 5 MG/ML
VIAL (ML) INTRAVENOUS
Status: DISPENSED
Start: 2021-06-30

## (undated) RX ORDER — IOPAMIDOL 408 MG/ML
INJECTION, SOLUTION INTRATHECAL
Status: DISPENSED
Start: 2022-07-01

## (undated) RX ORDER — LEVOFLOXACIN 5 MG/ML
INJECTION, SOLUTION INTRAVENOUS
Status: DISPENSED
Start: 2022-10-07

## (undated) RX ORDER — LIDOCAINE HYDROCHLORIDE 20 MG/ML
SOLUTION OROPHARYNGEAL
Status: DISPENSED
Start: 2022-07-01

## (undated) RX ORDER — FENTANYL CITRATE 50 UG/ML
INJECTION, SOLUTION INTRAMUSCULAR; INTRAVENOUS
Status: DISPENSED
Start: 2023-04-19

## (undated) RX ORDER — BUPIVACAINE HYDROCHLORIDE 2.5 MG/ML
INJECTION, SOLUTION EPIDURAL; INFILTRATION; INTRACAUDAL
Status: DISPENSED
Start: 2022-07-02

## (undated) RX ORDER — LIDOCAINE HYDROCHLORIDE 20 MG/ML
SOLUTION OROPHARYNGEAL
Status: DISPENSED
Start: 2022-07-19

## (undated) RX ORDER — HEPARIN SODIUM 1000 [USP'U]/ML
INJECTION, SOLUTION INTRAVENOUS; SUBCUTANEOUS
Status: DISPENSED
Start: 2022-07-02

## (undated) RX ORDER — PROPOFOL 10 MG/ML
INJECTION, EMULSION INTRAVENOUS
Status: DISPENSED
Start: 2023-10-09

## (undated) RX ORDER — ONDANSETRON 2 MG/ML
INJECTION INTRAMUSCULAR; INTRAVENOUS
Status: DISPENSED
Start: 2023-04-19

## (undated) RX ORDER — CEFAZOLIN SODIUM 2 G/100ML
INJECTION, SOLUTION INTRAVENOUS
Status: DISPENSED
Start: 2024-02-14

## (undated) RX ORDER — FENTANYL CITRATE 50 UG/ML
INJECTION, SOLUTION INTRAMUSCULAR; INTRAVENOUS
Status: DISPENSED
Start: 2022-09-26

## (undated) RX ORDER — FENTANYL CITRATE 50 UG/ML
INJECTION, SOLUTION INTRAMUSCULAR; INTRAVENOUS
Status: DISPENSED
Start: 2022-07-27

## (undated) RX ORDER — LIDOCAINE HYDROCHLORIDE 20 MG/ML
SOLUTION OROPHARYNGEAL
Status: DISPENSED
Start: 2023-04-19

## (undated) RX ORDER — LIDOCAINE HYDROCHLORIDE 20 MG/ML
INJECTION, SOLUTION EPIDURAL; INFILTRATION; INTRACAUDAL; PERINEURAL
Status: DISPENSED
Start: 2022-06-28

## (undated) RX ORDER — FENTANYL CITRATE 50 UG/ML
INJECTION, SOLUTION INTRAMUSCULAR; INTRAVENOUS
Status: DISPENSED
Start: 2023-01-25

## (undated) RX ORDER — ONDANSETRON 2 MG/ML
INJECTION INTRAMUSCULAR; INTRAVENOUS
Status: DISPENSED
Start: 2023-10-09

## (undated) RX ORDER — FENTANYL CITRATE 50 UG/ML
INJECTION, SOLUTION INTRAMUSCULAR; INTRAVENOUS
Status: DISPENSED
Start: 2022-10-07

## (undated) RX ORDER — FENTANYL CITRATE 50 UG/ML
INJECTION, SOLUTION INTRAMUSCULAR; INTRAVENOUS
Status: DISPENSED
Start: 2022-08-11

## (undated) RX ORDER — HEPARIN SODIUM,PORCINE 10 UNIT/ML
VIAL (ML) INTRAVENOUS
Status: DISPENSED
Start: 2022-06-28

## (undated) RX ORDER — LIDOCAINE HYDROCHLORIDE 40 MG/ML
SOLUTION TOPICAL
Status: DISPENSED
Start: 2023-04-19

## (undated) RX ORDER — LIDOCAINE HYDROCHLORIDE 20 MG/ML
SOLUTION OROPHARYNGEAL
Status: DISPENSED
Start: 2022-08-02